# Patient Record
Sex: FEMALE | Race: WHITE | NOT HISPANIC OR LATINO | Employment: OTHER | URBAN - METROPOLITAN AREA
[De-identification: names, ages, dates, MRNs, and addresses within clinical notes are randomized per-mention and may not be internally consistent; named-entity substitution may affect disease eponyms.]

---

## 2017-01-04 ENCOUNTER — AMBULATORY - HEALTHEAST (OUTPATIENT)
Dept: INTERNAL MEDICINE | Facility: CLINIC | Age: 62
End: 2017-01-04

## 2017-01-04 ENCOUNTER — COMMUNICATION - HEALTHEAST (OUTPATIENT)
Dept: INTERNAL MEDICINE | Facility: CLINIC | Age: 62
End: 2017-01-04

## 2017-01-08 ENCOUNTER — COMMUNICATION - HEALTHEAST (OUTPATIENT)
Dept: INTERNAL MEDICINE | Facility: CLINIC | Age: 62
End: 2017-01-08

## 2017-01-08 DIAGNOSIS — K21.00 GASTROESOPHAGEAL REFLUX DISEASE WITH ESOPHAGITIS: ICD-10-CM

## 2017-02-08 ENCOUNTER — COMMUNICATION - HEALTHEAST (OUTPATIENT)
Dept: INTERNAL MEDICINE | Facility: CLINIC | Age: 62
End: 2017-02-08

## 2017-02-08 ENCOUNTER — OFFICE VISIT - HEALTHEAST (OUTPATIENT)
Dept: INTERNAL MEDICINE | Facility: CLINIC | Age: 62
End: 2017-02-08

## 2017-02-08 DIAGNOSIS — E03.9 HYPOTHYROIDISM: ICD-10-CM

## 2017-02-08 DIAGNOSIS — E01.0 THYROMEGALY: ICD-10-CM

## 2017-02-08 DIAGNOSIS — Z51.81 MEDICATION MONITORING ENCOUNTER: ICD-10-CM

## 2017-02-08 DIAGNOSIS — J20.9 BRONCHITIS, ACUTE, WITH BRONCHOSPASM: ICD-10-CM

## 2017-02-09 ENCOUNTER — COMMUNICATION - HEALTHEAST (OUTPATIENT)
Dept: INTERNAL MEDICINE | Facility: CLINIC | Age: 62
End: 2017-02-09

## 2017-02-09 ENCOUNTER — COMMUNICATION - HEALTHEAST (OUTPATIENT)
Dept: PALLIATIVE MEDICINE | Facility: OTHER | Age: 62
End: 2017-02-09

## 2017-02-10 ENCOUNTER — COMMUNICATION - HEALTHEAST (OUTPATIENT)
Dept: INTERNAL MEDICINE | Facility: CLINIC | Age: 62
End: 2017-02-10

## 2017-02-17 ENCOUNTER — HOSPITAL ENCOUNTER (OUTPATIENT)
Dept: PALLIATIVE MEDICINE | Facility: OTHER | Age: 62
Discharge: HOME | End: 2017-02-17
Attending: NURSE PRACTITIONER

## 2017-02-17 ENCOUNTER — COMMUNICATION - HEALTHEAST (OUTPATIENT)
Dept: TELEHEALTH | Facility: CLINIC | Age: 62
End: 2017-02-17

## 2017-02-17 DIAGNOSIS — G89.4 CHRONIC PAIN SYNDROME: ICD-10-CM

## 2017-02-17 DIAGNOSIS — M79.671 PAIN IN RIGHT FOOT: ICD-10-CM

## 2017-02-17 DIAGNOSIS — M46.1 SACROILIITIS (H): ICD-10-CM

## 2017-02-17 ASSESSMENT — MIFFLIN-ST. JEOR: SCORE: 1595.02

## 2017-02-20 ENCOUNTER — COMMUNICATION - HEALTHEAST (OUTPATIENT)
Dept: PALLIATIVE MEDICINE | Facility: OTHER | Age: 62
End: 2017-02-20

## 2017-03-01 ENCOUNTER — RECORDS - HEALTHEAST (OUTPATIENT)
Dept: ADMINISTRATIVE | Facility: OTHER | Age: 62
End: 2017-03-01

## 2017-03-09 ENCOUNTER — COMMUNICATION - HEALTHEAST (OUTPATIENT)
Dept: TELEHEALTH | Facility: CLINIC | Age: 62
End: 2017-03-09

## 2017-03-09 ENCOUNTER — HOSPITAL ENCOUNTER (OUTPATIENT)
Dept: ULTRASOUND IMAGING | Facility: HOSPITAL | Age: 62
Discharge: HOME | End: 2017-03-09
Attending: INTERNAL MEDICINE

## 2017-03-09 DIAGNOSIS — E01.0 THYROMEGALY: ICD-10-CM

## 2017-03-09 DIAGNOSIS — E04.9 NONTOXIC GOITER, UNSPECIFIED: ICD-10-CM

## 2017-03-17 ENCOUNTER — HOSPITAL ENCOUNTER (OUTPATIENT)
Dept: PALLIATIVE MEDICINE | Facility: OTHER | Age: 62
Discharge: HOME | End: 2017-03-17
Attending: SOCIAL WORKER

## 2017-03-17 DIAGNOSIS — F41.1 GENERALIZED ANXIETY DISORDER: ICD-10-CM

## 2017-03-17 DIAGNOSIS — F33.2 SEVERE RECURRENT MAJOR DEPRESSION WITHOUT PSYCHOTIC FEATURES (H): ICD-10-CM

## 2017-03-17 DIAGNOSIS — G89.4 CHRONIC PAIN SYNDROME: ICD-10-CM

## 2017-03-21 ENCOUNTER — COMMUNICATION - HEALTHEAST (OUTPATIENT)
Dept: PALLIATIVE MEDICINE | Facility: OTHER | Age: 62
End: 2017-03-21

## 2017-03-21 ENCOUNTER — RECORDS - HEALTHEAST (OUTPATIENT)
Dept: ADMINISTRATIVE | Facility: OTHER | Age: 62
End: 2017-03-21

## 2017-03-21 ENCOUNTER — COMMUNICATION - HEALTHEAST (OUTPATIENT)
Dept: INTERNAL MEDICINE | Facility: CLINIC | Age: 62
End: 2017-03-21

## 2017-03-23 ENCOUNTER — AMBULATORY - HEALTHEAST (OUTPATIENT)
Dept: PALLIATIVE MEDICINE | Facility: OTHER | Age: 62
End: 2017-03-23

## 2017-03-27 ENCOUNTER — COMMUNICATION - HEALTHEAST (OUTPATIENT)
Dept: PALLIATIVE MEDICINE | Facility: OTHER | Age: 62
End: 2017-03-27

## 2017-03-27 DIAGNOSIS — M46.1 SACROILIITIS (H): ICD-10-CM

## 2017-03-27 DIAGNOSIS — M79.671 PAIN IN RIGHT FOOT: ICD-10-CM

## 2017-03-27 DIAGNOSIS — G89.4 CHRONIC PAIN SYNDROME: ICD-10-CM

## 2017-03-29 ENCOUNTER — COMMUNICATION - HEALTHEAST (OUTPATIENT)
Dept: INTERNAL MEDICINE | Facility: CLINIC | Age: 62
End: 2017-03-29

## 2017-03-29 ENCOUNTER — HOSPITAL ENCOUNTER (OUTPATIENT)
Dept: PALLIATIVE MEDICINE | Facility: OTHER | Age: 62
Discharge: HOME | End: 2017-03-29
Attending: SOCIAL WORKER

## 2017-03-29 DIAGNOSIS — G89.4 CHRONIC PAIN SYNDROME: ICD-10-CM

## 2017-03-29 DIAGNOSIS — F31.63 BIPOLAR DISORDER, CURRENT EPISODE MIXED, SEVERE, WITHOUT PSYCHOTIC FEATURES (H): ICD-10-CM

## 2017-03-29 DIAGNOSIS — F41.1 GENERALIZED ANXIETY DISORDER: ICD-10-CM

## 2017-03-30 ENCOUNTER — COMMUNICATION - HEALTHEAST (OUTPATIENT)
Dept: SCHEDULING | Facility: CLINIC | Age: 62
End: 2017-03-30

## 2017-04-04 ENCOUNTER — COMMUNICATION - HEALTHEAST (OUTPATIENT)
Dept: INTERNAL MEDICINE | Facility: CLINIC | Age: 62
End: 2017-04-04

## 2017-04-04 ENCOUNTER — OFFICE VISIT - HEALTHEAST (OUTPATIENT)
Dept: INTERNAL MEDICINE | Facility: CLINIC | Age: 62
End: 2017-04-04

## 2017-04-04 DIAGNOSIS — R05.9 COUGH: ICD-10-CM

## 2017-04-04 DIAGNOSIS — R61 NIGHT SWEATS: ICD-10-CM

## 2017-04-04 DIAGNOSIS — E83.119 HEMOCHROMATOSIS: ICD-10-CM

## 2017-04-04 DIAGNOSIS — C50.919 MALIGNANT NEOPLASM OF FEMALE BREAST (H): ICD-10-CM

## 2017-04-05 ENCOUNTER — COMMUNICATION - HEALTHEAST (OUTPATIENT)
Dept: INTERNAL MEDICINE | Facility: CLINIC | Age: 62
End: 2017-04-05

## 2017-04-05 DIAGNOSIS — G47.00 INSOMNIA: ICD-10-CM

## 2017-04-10 ENCOUNTER — HOSPITAL ENCOUNTER (OUTPATIENT)
Dept: RADIOLOGY | Facility: HOSPITAL | Age: 62
Discharge: HOME | End: 2017-04-10
Attending: INTERNAL MEDICINE

## 2017-04-10 ENCOUNTER — HOSPITAL ENCOUNTER (OUTPATIENT)
Dept: CT IMAGING | Facility: HOSPITAL | Age: 62
Discharge: HOME | End: 2017-04-10
Attending: INTERNAL MEDICINE

## 2017-04-10 ENCOUNTER — AMBULATORY - HEALTHEAST (OUTPATIENT)
Dept: INTERNAL MEDICINE | Facility: CLINIC | Age: 62
End: 2017-04-10

## 2017-04-10 ENCOUNTER — HOSPITAL ENCOUNTER (OUTPATIENT)
Dept: MAMMOGRAPHY | Facility: HOSPITAL | Age: 62
Discharge: HOME | End: 2017-04-10
Attending: INTERNAL MEDICINE

## 2017-04-10 DIAGNOSIS — D35.00 ADRENAL ADENOMA: ICD-10-CM

## 2017-04-10 DIAGNOSIS — Z12.31 VISIT FOR SCREENING MAMMOGRAM: ICD-10-CM

## 2017-04-10 DIAGNOSIS — R61 NIGHT SWEATS: ICD-10-CM

## 2017-04-13 ENCOUNTER — HOSPITAL ENCOUNTER (OUTPATIENT)
Dept: PALLIATIVE MEDICINE | Facility: OTHER | Age: 62
Discharge: HOME | End: 2017-04-13
Attending: SOCIAL WORKER

## 2017-04-13 ENCOUNTER — HOSPITAL ENCOUNTER (OUTPATIENT)
Dept: PALLIATIVE MEDICINE | Facility: OTHER | Age: 62
Discharge: HOME | End: 2017-04-13
Attending: NURSE PRACTITIONER

## 2017-04-13 DIAGNOSIS — F33.2 SEVERE RECURRENT MAJOR DEPRESSION WITHOUT PSYCHOTIC FEATURES (H): ICD-10-CM

## 2017-04-13 DIAGNOSIS — M79.671 PAIN IN RIGHT FOOT: ICD-10-CM

## 2017-04-13 DIAGNOSIS — F31.63 BIPOLAR DISORDER, CURRENT EPISODE MIXED, SEVERE, WITHOUT PSYCHOTIC FEATURES (H): ICD-10-CM

## 2017-04-13 DIAGNOSIS — F41.1 GENERALIZED ANXIETY DISORDER: ICD-10-CM

## 2017-04-13 DIAGNOSIS — G89.4 CHRONIC PAIN SYNDROME: ICD-10-CM

## 2017-04-13 DIAGNOSIS — M46.1 SACROILIITIS (H): ICD-10-CM

## 2017-04-13 ASSESSMENT — MIFFLIN-ST. JEOR: SCORE: 1590.49

## 2017-04-14 ENCOUNTER — OFFICE VISIT - HEALTHEAST (OUTPATIENT)
Dept: INTERNAL MEDICINE | Facility: CLINIC | Age: 62
End: 2017-04-14

## 2017-04-14 DIAGNOSIS — Z00.00 PREVENTATIVE HEALTH CARE: ICD-10-CM

## 2017-04-14 DIAGNOSIS — J44.1 CHRONIC OBSTRUCTIVE PULMONARY DISEASE WITH ACUTE EXACERBATION (H): ICD-10-CM

## 2017-04-14 DIAGNOSIS — D35.00 BENIGN NEOPLASM OF ADRENAL GLAND: ICD-10-CM

## 2017-04-14 DIAGNOSIS — R23.2 FLUSHING: ICD-10-CM

## 2017-04-14 DIAGNOSIS — E56.9 VITAMIN DEFICIENCY: ICD-10-CM

## 2017-04-14 DIAGNOSIS — M54.2 NECK PAIN: ICD-10-CM

## 2017-04-14 DIAGNOSIS — E87.6 HYPOKALEMIA: ICD-10-CM

## 2017-04-14 ASSESSMENT — MIFFLIN-ST. JEOR: SCORE: 1606.36

## 2017-04-17 ENCOUNTER — RECORDS - HEALTHEAST (OUTPATIENT)
Dept: ADMINISTRATIVE | Facility: OTHER | Age: 62
End: 2017-04-17

## 2017-04-19 ENCOUNTER — HOSPITAL ENCOUNTER (OUTPATIENT)
Dept: PALLIATIVE MEDICINE | Facility: OTHER | Age: 62
Discharge: HOME | End: 2017-04-19
Attending: SOCIAL WORKER

## 2017-04-19 ENCOUNTER — AMBULATORY - HEALTHEAST (OUTPATIENT)
Dept: LAB | Facility: CLINIC | Age: 62
End: 2017-04-19

## 2017-04-19 DIAGNOSIS — G89.4 CHRONIC PAIN SYNDROME: ICD-10-CM

## 2017-04-19 DIAGNOSIS — F33.2 SEVERE RECURRENT MAJOR DEPRESSION WITHOUT PSYCHOTIC FEATURES (H): ICD-10-CM

## 2017-04-19 DIAGNOSIS — F31.63 BIPOLAR DISORDER, CURRENT EPISODE MIXED, SEVERE, WITHOUT PSYCHOTIC FEATURES (H): ICD-10-CM

## 2017-04-19 DIAGNOSIS — D35.00 ADRENAL ADENOMA: ICD-10-CM

## 2017-04-19 DIAGNOSIS — F41.1 GENERALIZED ANXIETY DISORDER: ICD-10-CM

## 2017-04-20 ENCOUNTER — AMBULATORY - HEALTHEAST (OUTPATIENT)
Dept: LAB | Facility: CLINIC | Age: 62
End: 2017-04-20

## 2017-04-20 DIAGNOSIS — Z00.00 PREVENTATIVE HEALTH CARE: ICD-10-CM

## 2017-04-20 DIAGNOSIS — E56.9 VITAMIN DEFICIENCY: ICD-10-CM

## 2017-04-20 DIAGNOSIS — E87.6 HYPOKALEMIA: ICD-10-CM

## 2017-04-20 LAB
CHOLEST SERPL-MCNC: 234 MG/DL
FASTING STATUS PATIENT QL REPORTED: YES
HDLC SERPL-MCNC: 64 MG/DL
LDLC SERPL CALC-MCNC: 133 MG/DL
TRIGL SERPL-MCNC: 183 MG/DL

## 2017-04-21 LAB
Lab: 24 H
METANEPHRINE, U - HISTORICAL: 719 MCG/24 H
NORMETANEPHRINE, U - HISTORICAL: 409 MCG/24 H
SPECIMEN VOL UR: 2025 ML
TOTAL METANEPHRINES, U: 1128 MCG/24 H

## 2017-04-24 ENCOUNTER — OFFICE VISIT - HEALTHEAST (OUTPATIENT)
Dept: SLEEP MEDICINE | Facility: CLINIC | Age: 62
End: 2017-04-24

## 2017-04-24 ENCOUNTER — COMMUNICATION - HEALTHEAST (OUTPATIENT)
Dept: ENDOCRINOLOGY | Facility: CLINIC | Age: 62
End: 2017-04-24

## 2017-04-24 ENCOUNTER — COMMUNICATION - HEALTHEAST (OUTPATIENT)
Dept: INTERNAL MEDICINE | Facility: CLINIC | Age: 62
End: 2017-04-24

## 2017-04-24 ENCOUNTER — AMBULATORY - HEALTHEAST (OUTPATIENT)
Dept: PALLIATIVE MEDICINE | Facility: OTHER | Age: 62
End: 2017-04-24

## 2017-04-24 DIAGNOSIS — D35.00 ADRENAL ADENOMA: ICD-10-CM

## 2017-04-24 DIAGNOSIS — F63.9 IMPULSE CONTROL DISORDER: ICD-10-CM

## 2017-04-24 DIAGNOSIS — G25.81 RESTLESS LEG SYNDROME: ICD-10-CM

## 2017-04-24 DIAGNOSIS — R06.83 SNORING: ICD-10-CM

## 2017-04-24 DIAGNOSIS — G47.10 HYPERSOMNIA, UNSPECIFIED: ICD-10-CM

## 2017-04-24 ASSESSMENT — MIFFLIN-ST. JEOR: SCORE: 1612.26

## 2017-04-26 ENCOUNTER — HOSPITAL ENCOUNTER (OUTPATIENT)
Dept: PALLIATIVE MEDICINE | Facility: OTHER | Age: 62
Discharge: HOME | End: 2017-04-26
Attending: SOCIAL WORKER

## 2017-04-26 DIAGNOSIS — F31.63 BIPOLAR DISORDER, CURRENT EPISODE MIXED, SEVERE, WITHOUT PSYCHOTIC FEATURES (H): ICD-10-CM

## 2017-04-26 DIAGNOSIS — G89.4 CHRONIC PAIN SYNDROME: ICD-10-CM

## 2017-04-26 DIAGNOSIS — F33.2 SEVERE RECURRENT MAJOR DEPRESSION WITHOUT PSYCHOTIC FEATURES (H): ICD-10-CM

## 2017-04-26 DIAGNOSIS — F41.1 GENERALIZED ANXIETY DISORDER: ICD-10-CM

## 2017-05-02 ENCOUNTER — HOSPITAL ENCOUNTER (OUTPATIENT)
Dept: PALLIATIVE MEDICINE | Facility: OTHER | Age: 62
Discharge: HOME | End: 2017-05-02
Attending: SOCIAL WORKER

## 2017-05-02 DIAGNOSIS — G89.4 CHRONIC PAIN SYNDROME: ICD-10-CM

## 2017-05-02 DIAGNOSIS — F41.1 GENERALIZED ANXIETY DISORDER: ICD-10-CM

## 2017-05-02 DIAGNOSIS — F31.63 BIPOLAR DISORDER, CURRENT EPISODE MIXED, SEVERE, WITHOUT PSYCHOTIC FEATURES (H): ICD-10-CM

## 2017-05-03 ENCOUNTER — COMMUNICATION - HEALTHEAST (OUTPATIENT)
Dept: INTERNAL MEDICINE | Facility: CLINIC | Age: 62
End: 2017-05-03

## 2017-05-03 ENCOUNTER — OFFICE VISIT - HEALTHEAST (OUTPATIENT)
Dept: INTERNAL MEDICINE | Facility: CLINIC | Age: 62
End: 2017-05-03

## 2017-05-03 DIAGNOSIS — D35.00 ADRENAL ADENOMA: ICD-10-CM

## 2017-05-03 DIAGNOSIS — I10 ESSENTIAL HYPERTENSION WITH GOAL BLOOD PRESSURE LESS THAN 140/90: ICD-10-CM

## 2017-05-03 DIAGNOSIS — M89.9 DISORDER OF BONE AND CARTILAGE: ICD-10-CM

## 2017-05-03 DIAGNOSIS — E89.0 POSTABLATIVE HYPOTHYROIDISM: ICD-10-CM

## 2017-05-03 DIAGNOSIS — M94.9 DISORDER OF BONE AND CARTILAGE: ICD-10-CM

## 2017-05-05 ENCOUNTER — RECORDS - HEALTHEAST (OUTPATIENT)
Dept: ADMINISTRATIVE | Facility: OTHER | Age: 62
End: 2017-05-05

## 2017-05-06 ENCOUNTER — COMMUNICATION - HEALTHEAST (OUTPATIENT)
Dept: INTERNAL MEDICINE | Facility: CLINIC | Age: 62
End: 2017-05-06

## 2017-05-08 ENCOUNTER — RECORDS - HEALTHEAST (OUTPATIENT)
Dept: ADMINISTRATIVE | Facility: OTHER | Age: 62
End: 2017-05-08

## 2017-05-09 ENCOUNTER — TRANSFERRED RECORDS (OUTPATIENT)
Dept: HEALTH INFORMATION MANAGEMENT | Facility: CLINIC | Age: 62
End: 2017-05-09

## 2017-05-09 ENCOUNTER — RECORDS - HEALTHEAST (OUTPATIENT)
Dept: SLEEP MEDICINE | Facility: CLINIC | Age: 62
End: 2017-05-09

## 2017-05-09 DIAGNOSIS — G47.10 HYPERSOMNIA, UNSPECIFIED: ICD-10-CM

## 2017-05-09 DIAGNOSIS — R06.83 SNORING: ICD-10-CM

## 2017-05-12 ENCOUNTER — OFFICE VISIT - HEALTHEAST (OUTPATIENT)
Dept: INTERNAL MEDICINE | Facility: CLINIC | Age: 62
End: 2017-05-12

## 2017-05-12 DIAGNOSIS — I10 ESSENTIAL HYPERTENSION WITH GOAL BLOOD PRESSURE LESS THAN 140/90: ICD-10-CM

## 2017-05-12 DIAGNOSIS — G47.33 SLEEP APNEA, OBSTRUCTIVE: ICD-10-CM

## 2017-05-12 DIAGNOSIS — J44.9 COPD (CHRONIC OBSTRUCTIVE PULMONARY DISEASE) (H): ICD-10-CM

## 2017-05-12 DIAGNOSIS — E03.9 ACQUIRED HYPOTHYROIDISM: ICD-10-CM

## 2017-05-12 DIAGNOSIS — F41.1 ANXIETY STATE: ICD-10-CM

## 2017-05-12 DIAGNOSIS — E83.10 DISORDER OF IRON METABOLISM: ICD-10-CM

## 2017-05-12 DIAGNOSIS — Z01.818 PRE-OPERATIVE EXAMINATION FOR INTERNAL MEDICINE: ICD-10-CM

## 2017-05-12 ASSESSMENT — MIFFLIN-ST. JEOR: SCORE: 1585.95

## 2017-05-15 LAB
ATRIAL RATE - MUSE: 89 BPM
DIASTOLIC BLOOD PRESSURE - MUSE: NORMAL MMHG
INTERPRETATION ECG - MUSE: NORMAL
P AXIS - MUSE: 63 DEGREES
PR INTERVAL - MUSE: 168 MS
QRS DURATION - MUSE: 116 MS
QT - MUSE: 398 MS
QTC - MUSE: 484 MS
R AXIS - MUSE: 76 DEGREES
SYSTOLIC BLOOD PRESSURE - MUSE: NORMAL MMHG
T AXIS - MUSE: 43 DEGREES
VENTRICULAR RATE- MUSE: 89 BPM

## 2017-05-17 ENCOUNTER — HOSPITAL ENCOUNTER (OUTPATIENT)
Dept: PALLIATIVE MEDICINE | Facility: OTHER | Age: 62
Discharge: HOME | End: 2017-05-17
Attending: NURSE PRACTITIONER

## 2017-05-17 DIAGNOSIS — G89.4 CHRONIC PAIN SYNDROME: ICD-10-CM

## 2017-05-17 DIAGNOSIS — M79.671 PAIN IN RIGHT FOOT: ICD-10-CM

## 2017-05-17 DIAGNOSIS — M25.511 RIGHT SHOULDER PAIN, UNSPECIFIED CHRONICITY: ICD-10-CM

## 2017-05-17 DIAGNOSIS — M46.1 SACROILIITIS (H): ICD-10-CM

## 2017-05-17 ASSESSMENT — MIFFLIN-ST. JEOR: SCORE: 1585.95

## 2017-05-18 ENCOUNTER — COMMUNICATION - HEALTHEAST (OUTPATIENT)
Dept: SLEEP MEDICINE | Facility: CLINIC | Age: 62
End: 2017-05-18

## 2017-05-18 ENCOUNTER — COMMUNICATION - HEALTHEAST (OUTPATIENT)
Dept: INTERNAL MEDICINE | Facility: CLINIC | Age: 62
End: 2017-05-18

## 2017-05-19 ENCOUNTER — AMBULATORY - HEALTHEAST (OUTPATIENT)
Dept: SLEEP MEDICINE | Facility: CLINIC | Age: 62
End: 2017-05-19

## 2017-05-19 ENCOUNTER — OFFICE VISIT - HEALTHEAST (OUTPATIENT)
Dept: SLEEP MEDICINE | Facility: CLINIC | Age: 62
End: 2017-05-19

## 2017-05-19 DIAGNOSIS — G47.33 OSA (OBSTRUCTIVE SLEEP APNEA): ICD-10-CM

## 2017-05-19 DIAGNOSIS — G25.81 RESTLESS LEG SYNDROME: ICD-10-CM

## 2017-05-19 ASSESSMENT — MIFFLIN-ST. JEOR: SCORE: 1585.95

## 2017-05-22 ENCOUNTER — COMMUNICATION - HEALTHEAST (OUTPATIENT)
Dept: SLEEP MEDICINE | Facility: CLINIC | Age: 62
End: 2017-05-22

## 2017-05-23 ENCOUNTER — COMMUNICATION - HEALTHEAST (OUTPATIENT)
Dept: PALLIATIVE MEDICINE | Facility: OTHER | Age: 62
End: 2017-05-23

## 2017-05-24 ENCOUNTER — RECORDS - HEALTHEAST (OUTPATIENT)
Dept: ADMINISTRATIVE | Facility: OTHER | Age: 62
End: 2017-05-24

## 2017-05-31 ENCOUNTER — HOSPITAL ENCOUNTER (OUTPATIENT)
Dept: PALLIATIVE MEDICINE | Facility: OTHER | Age: 62
Discharge: HOME | End: 2017-05-31
Attending: SOCIAL WORKER

## 2017-05-31 ENCOUNTER — COMMUNICATION - HEALTHEAST (OUTPATIENT)
Dept: INTERNAL MEDICINE | Facility: CLINIC | Age: 62
End: 2017-05-31

## 2017-05-31 DIAGNOSIS — G89.4 CHRONIC PAIN SYNDROME: ICD-10-CM

## 2017-05-31 DIAGNOSIS — F41.1 GENERALIZED ANXIETY DISORDER: ICD-10-CM

## 2017-05-31 DIAGNOSIS — F31.63 BIPOLAR DISORDER, CURRENT EPISODE MIXED, SEVERE, WITHOUT PSYCHOTIC FEATURES (H): ICD-10-CM

## 2017-06-02 ENCOUNTER — COMMUNICATION - HEALTHEAST (OUTPATIENT)
Dept: SCHEDULING | Facility: CLINIC | Age: 62
End: 2017-06-02

## 2017-06-02 ENCOUNTER — OFFICE VISIT - HEALTHEAST (OUTPATIENT)
Dept: INTERNAL MEDICINE | Facility: CLINIC | Age: 62
End: 2017-06-02

## 2017-06-02 ENCOUNTER — AMBULATORY - HEALTHEAST (OUTPATIENT)
Dept: SCHEDULING | Facility: CLINIC | Age: 62
End: 2017-06-02

## 2017-06-02 DIAGNOSIS — D35.00 PHEOCHROMOCYTOMA: ICD-10-CM

## 2017-06-05 ENCOUNTER — RECORDS - HEALTHEAST (OUTPATIENT)
Dept: ADMINISTRATIVE | Facility: OTHER | Age: 62
End: 2017-06-05

## 2017-06-05 ENCOUNTER — COMMUNICATION - HEALTHEAST (OUTPATIENT)
Dept: PALLIATIVE MEDICINE | Facility: OTHER | Age: 62
End: 2017-06-05

## 2017-06-05 ENCOUNTER — COMMUNICATION - HEALTHEAST (OUTPATIENT)
Dept: SLEEP MEDICINE | Facility: CLINIC | Age: 62
End: 2017-06-05

## 2017-06-06 ENCOUNTER — HOSPITAL ENCOUNTER (OUTPATIENT)
Dept: PALLIATIVE MEDICINE | Facility: OTHER | Age: 62
Discharge: HOME | End: 2017-06-06
Attending: SOCIAL WORKER

## 2017-06-06 DIAGNOSIS — G89.4 CHRONIC PAIN SYNDROME: ICD-10-CM

## 2017-06-06 DIAGNOSIS — F31.63 BIPOLAR DISORDER, CURRENT EPISODE MIXED, SEVERE, WITHOUT PSYCHOTIC FEATURES (H): ICD-10-CM

## 2017-06-06 DIAGNOSIS — F41.1 GENERALIZED ANXIETY DISORDER: ICD-10-CM

## 2017-06-12 ENCOUNTER — HOSPITAL ENCOUNTER (OUTPATIENT)
Dept: PALLIATIVE MEDICINE | Facility: OTHER | Age: 62
Discharge: HOME | End: 2017-06-12
Attending: SOCIAL WORKER

## 2017-06-12 DIAGNOSIS — G89.4 CHRONIC PAIN SYNDROME: ICD-10-CM

## 2017-06-12 DIAGNOSIS — F41.1 GENERALIZED ANXIETY DISORDER: ICD-10-CM

## 2017-06-12 DIAGNOSIS — F31.63 BIPOLAR DISORDER, CURRENT EPISODE MIXED, SEVERE, WITHOUT PSYCHOTIC FEATURES (H): ICD-10-CM

## 2017-06-13 ENCOUNTER — HOSPITAL ENCOUNTER (OUTPATIENT)
Dept: PALLIATIVE MEDICINE | Facility: OTHER | Age: 62
Discharge: HOME | End: 2017-06-13
Attending: NURSE PRACTITIONER

## 2017-06-13 ENCOUNTER — AMBULATORY - HEALTHEAST (OUTPATIENT)
Dept: PALLIATIVE MEDICINE | Facility: OTHER | Age: 62
End: 2017-06-13

## 2017-06-13 ENCOUNTER — RECORDS - HEALTHEAST (OUTPATIENT)
Dept: ADMINISTRATIVE | Facility: OTHER | Age: 62
End: 2017-06-13

## 2017-06-13 DIAGNOSIS — M46.1 SACROILIITIS (H): ICD-10-CM

## 2017-06-13 DIAGNOSIS — M54.2 NECK PAIN: ICD-10-CM

## 2017-06-13 DIAGNOSIS — G89.4 CHRONIC PAIN SYNDROME: ICD-10-CM

## 2017-06-13 DIAGNOSIS — M79.671 PAIN IN RIGHT FOOT: ICD-10-CM

## 2017-06-13 DIAGNOSIS — M25.511 ACUTE PAIN OF RIGHT SHOULDER: ICD-10-CM

## 2017-06-13 ASSESSMENT — MIFFLIN-ST. JEOR: SCORE: 1590.49

## 2017-06-14 ENCOUNTER — RECORDS - HEALTHEAST (OUTPATIENT)
Dept: ADMINISTRATIVE | Facility: OTHER | Age: 62
End: 2017-06-14

## 2017-06-15 ENCOUNTER — HOSPITAL ENCOUNTER (OUTPATIENT)
Dept: MRI IMAGING | Facility: HOSPITAL | Age: 62
Discharge: HOME | End: 2017-06-15

## 2017-06-15 DIAGNOSIS — D35.02 ADRENAL ADENOMA, LEFT: ICD-10-CM

## 2017-06-15 DIAGNOSIS — D35.00 PHEOCHROMOCYTOMA: ICD-10-CM

## 2017-06-20 ENCOUNTER — OFFICE VISIT - HEALTHEAST (OUTPATIENT)
Dept: SURGERY | Facility: CLINIC | Age: 62
End: 2017-06-20

## 2017-06-20 DIAGNOSIS — D35.00 PHEOCHROMOCYTOMA: ICD-10-CM

## 2017-06-20 ASSESSMENT — MIFFLIN-ST. JEOR: SCORE: 1606.82

## 2017-06-28 ENCOUNTER — COMMUNICATION - HEALTHEAST (OUTPATIENT)
Dept: INTERNAL MEDICINE | Facility: CLINIC | Age: 62
End: 2017-06-28

## 2017-07-12 ENCOUNTER — OFFICE VISIT - HEALTHEAST (OUTPATIENT)
Dept: INTERNAL MEDICINE | Facility: CLINIC | Age: 62
End: 2017-07-12

## 2017-07-12 DIAGNOSIS — R32 INCONTINENCE: ICD-10-CM

## 2017-07-12 DIAGNOSIS — E87.6 HYPOKALEMIA: ICD-10-CM

## 2017-07-12 DIAGNOSIS — E27.8 ADRENAL MASS (H): ICD-10-CM

## 2017-07-12 ASSESSMENT — MIFFLIN-ST. JEOR: SCORE: 1552.5

## 2017-07-13 ENCOUNTER — RECORDS - HEALTHEAST (OUTPATIENT)
Dept: ADMINISTRATIVE | Facility: OTHER | Age: 62
End: 2017-07-13

## 2017-07-17 ENCOUNTER — COMMUNICATION - HEALTHEAST (OUTPATIENT)
Dept: ADMINISTRATIVE | Facility: CLINIC | Age: 62
End: 2017-07-17

## 2017-07-19 ENCOUNTER — COMMUNICATION - HEALTHEAST (OUTPATIENT)
Dept: SLEEP MEDICINE | Facility: CLINIC | Age: 62
End: 2017-07-19

## 2017-07-25 ENCOUNTER — OFFICE VISIT - HEALTHEAST (OUTPATIENT)
Dept: INTERNAL MEDICINE | Facility: CLINIC | Age: 62
End: 2017-07-25

## 2017-07-25 DIAGNOSIS — M54.2 NECK PAIN: ICD-10-CM

## 2017-07-25 DIAGNOSIS — E83.10 DISORDER OF IRON METABOLISM: ICD-10-CM

## 2017-07-25 DIAGNOSIS — J44.9 COPD (CHRONIC OBSTRUCTIVE PULMONARY DISEASE) (H): ICD-10-CM

## 2017-07-25 DIAGNOSIS — G89.4 CHRONIC PAIN DISORDER: ICD-10-CM

## 2017-07-25 DIAGNOSIS — E27.8 ADRENAL MASS (H): ICD-10-CM

## 2017-07-25 DIAGNOSIS — Z01.818 PREOP EXAM FOR INTERNAL MEDICINE: ICD-10-CM

## 2017-07-25 DIAGNOSIS — G47.33 SLEEP APNEA, OBSTRUCTIVE: ICD-10-CM

## 2017-07-25 DIAGNOSIS — M25.511 ACUTE PAIN OF RIGHT SHOULDER: ICD-10-CM

## 2017-07-25 DIAGNOSIS — K21.9 ESOPHAGEAL REFLUX: ICD-10-CM

## 2017-07-25 DIAGNOSIS — I10 ESSENTIAL HYPERTENSION WITH GOAL BLOOD PRESSURE LESS THAN 140/90: ICD-10-CM

## 2017-07-25 DIAGNOSIS — E66.01 MORBID OBESITY (H): ICD-10-CM

## 2017-07-25 DIAGNOSIS — M46.1 SACROILIITIS (H): ICD-10-CM

## 2017-07-25 ASSESSMENT — MIFFLIN-ST. JEOR: SCORE: 1584.26

## 2017-07-31 ENCOUNTER — HOSPITAL ENCOUNTER (OUTPATIENT)
Dept: PALLIATIVE MEDICINE | Facility: OTHER | Age: 62
Discharge: HOME | End: 2017-07-31
Attending: SOCIAL WORKER

## 2017-07-31 ENCOUNTER — HOSPITAL ENCOUNTER (OUTPATIENT)
Dept: PALLIATIVE MEDICINE | Facility: OTHER | Age: 62
Discharge: HOME | End: 2017-07-31
Attending: NURSE PRACTITIONER

## 2017-07-31 ENCOUNTER — AMBULATORY - HEALTHEAST (OUTPATIENT)
Dept: SURGERY | Facility: CLINIC | Age: 62
End: 2017-07-31

## 2017-07-31 DIAGNOSIS — F41.1 GENERALIZED ANXIETY DISORDER: ICD-10-CM

## 2017-07-31 DIAGNOSIS — M25.511 ACUTE PAIN OF RIGHT SHOULDER: ICD-10-CM

## 2017-07-31 DIAGNOSIS — M54.2 NECK PAIN: ICD-10-CM

## 2017-07-31 DIAGNOSIS — M46.1 SACROILIITIS (H): ICD-10-CM

## 2017-07-31 DIAGNOSIS — G89.4 CHRONIC PAIN SYNDROME: ICD-10-CM

## 2017-07-31 DIAGNOSIS — F31.63 BIPOLAR DISORDER, CURRENT EPISODE MIXED, SEVERE, WITHOUT PSYCHOTIC FEATURES (H): ICD-10-CM

## 2017-07-31 DIAGNOSIS — M79.671 PAIN IN RIGHT FOOT: ICD-10-CM

## 2017-07-31 ASSESSMENT — MIFFLIN-ST. JEOR: SCORE: 1595.02

## 2017-08-01 ENCOUNTER — OFFICE VISIT - HEALTHEAST (OUTPATIENT)
Dept: SURGERY | Facility: CLINIC | Age: 62
End: 2017-08-01

## 2017-08-01 ENCOUNTER — ANESTHESIA - HEALTHEAST (OUTPATIENT)
Dept: SURGERY | Facility: HOSPITAL | Age: 62
End: 2017-08-01

## 2017-08-01 DIAGNOSIS — D35.02 PHEOCHROMOCYTOMA OF LEFT ADRENAL GLAND: ICD-10-CM

## 2017-08-01 ASSESSMENT — MIFFLIN-ST. JEOR
SCORE: 1595.02
SCORE: 1595.02

## 2017-08-02 ENCOUNTER — SURGERY - HEALTHEAST (OUTPATIENT)
Dept: SURGERY | Facility: HOSPITAL | Age: 62
End: 2017-08-02

## 2017-08-02 ASSESSMENT — MIFFLIN-ST. JEOR: SCORE: 1617.7

## 2017-08-03 ENCOUNTER — HOME CARE/HOSPICE - HEALTHEAST (OUTPATIENT)
Dept: HOME HEALTH SERVICES | Facility: HOME HEALTH | Age: 62
End: 2017-08-03

## 2017-08-03 ENCOUNTER — COMMUNICATION - HEALTHEAST (OUTPATIENT)
Dept: SURGERY | Facility: CLINIC | Age: 62
End: 2017-08-03

## 2017-08-03 ASSESSMENT — MIFFLIN-ST. JEOR: SCORE: 1618.16

## 2017-08-04 ASSESSMENT — MIFFLIN-ST. JEOR: SCORE: 1615.89

## 2017-08-07 ENCOUNTER — COMMUNICATION - HEALTHEAST (OUTPATIENT)
Dept: INTERNAL MEDICINE | Facility: CLINIC | Age: 62
End: 2017-08-07

## 2017-08-07 ENCOUNTER — COMMUNICATION - HEALTHEAST (OUTPATIENT)
Dept: HOME HEALTH SERVICES | Facility: HOME HEALTH | Age: 62
End: 2017-08-07

## 2017-08-08 ENCOUNTER — COMMUNICATION - HEALTHEAST (OUTPATIENT)
Dept: SCHEDULING | Facility: CLINIC | Age: 62
End: 2017-08-08

## 2017-08-08 ENCOUNTER — RECORDS - HEALTHEAST (OUTPATIENT)
Dept: ADMINISTRATIVE | Facility: OTHER | Age: 62
End: 2017-08-08

## 2017-08-08 ENCOUNTER — COMMUNICATION - HEALTHEAST (OUTPATIENT)
Dept: INTERNAL MEDICINE | Facility: CLINIC | Age: 62
End: 2017-08-08

## 2017-08-08 DIAGNOSIS — J44.1 CHRONIC OBSTRUCTIVE PULMONARY DISEASE WITH ACUTE EXACERBATION (H): ICD-10-CM

## 2017-08-09 ENCOUNTER — OFFICE VISIT - HEALTHEAST (OUTPATIENT)
Dept: INTERNAL MEDICINE | Facility: CLINIC | Age: 62
End: 2017-08-09

## 2017-08-09 ENCOUNTER — COMMUNICATION - HEALTHEAST (OUTPATIENT)
Dept: INTERNAL MEDICINE | Facility: CLINIC | Age: 62
End: 2017-08-09

## 2017-08-09 DIAGNOSIS — J44.9 COPD (CHRONIC OBSTRUCTIVE PULMONARY DISEASE) (H): ICD-10-CM

## 2017-08-09 DIAGNOSIS — Z09 HOSPITAL DISCHARGE FOLLOW-UP: ICD-10-CM

## 2017-08-11 ENCOUNTER — COMMUNICATION - HEALTHEAST (OUTPATIENT)
Dept: PALLIATIVE MEDICINE | Facility: OTHER | Age: 62
End: 2017-08-11

## 2017-08-11 ENCOUNTER — RECORDS - HEALTHEAST (OUTPATIENT)
Dept: ADMINISTRATIVE | Facility: OTHER | Age: 62
End: 2017-08-11

## 2017-08-17 ENCOUNTER — OFFICE VISIT - HEALTHEAST (OUTPATIENT)
Dept: SURGERY | Facility: CLINIC | Age: 62
End: 2017-08-17

## 2017-08-17 DIAGNOSIS — D35.02 PHEOCHROMOCYTOMA, BENIGN, LEFT: ICD-10-CM

## 2017-08-17 ASSESSMENT — MIFFLIN-ST. JEOR: SCORE: 1567.81

## 2017-08-25 ENCOUNTER — COMMUNICATION - HEALTHEAST (OUTPATIENT)
Dept: INTERNAL MEDICINE | Facility: CLINIC | Age: 62
End: 2017-08-25

## 2017-09-06 ENCOUNTER — OFFICE VISIT - HEALTHEAST (OUTPATIENT)
Dept: INTERNAL MEDICINE | Facility: CLINIC | Age: 62
End: 2017-09-06

## 2017-09-06 DIAGNOSIS — L40.9 PSORIASIS: ICD-10-CM

## 2017-09-06 DIAGNOSIS — E34.9 HYPERTENSION SECONDARY TO ENDOCRINE DISORDER WITH GOAL BLOOD PRESSURE LESS THAN 140/90: ICD-10-CM

## 2017-09-06 DIAGNOSIS — G25.81 RESTLESS LEGS SYNDROME (RLS): ICD-10-CM

## 2017-09-06 DIAGNOSIS — Z98.890 POST-OPERATIVE STATE: ICD-10-CM

## 2017-09-06 DIAGNOSIS — Z00.00 HEALTH CARE MAINTENANCE: ICD-10-CM

## 2017-09-06 DIAGNOSIS — G47.00 INSOMNIA: ICD-10-CM

## 2017-09-06 DIAGNOSIS — I15.2 HYPERTENSION SECONDARY TO ENDOCRINE DISORDER WITH GOAL BLOOD PRESSURE LESS THAN 140/90: ICD-10-CM

## 2017-09-07 ENCOUNTER — HOSPITAL ENCOUNTER (OUTPATIENT)
Dept: PALLIATIVE MEDICINE | Facility: OTHER | Age: 62
Discharge: HOME | End: 2017-09-07
Attending: NURSE PRACTITIONER

## 2017-09-07 DIAGNOSIS — M46.1 SACROILIITIS (H): ICD-10-CM

## 2017-09-07 DIAGNOSIS — M79.671 PAIN IN RIGHT FOOT: ICD-10-CM

## 2017-09-07 DIAGNOSIS — G89.4 CHRONIC PAIN SYNDROME: ICD-10-CM

## 2017-09-07 ASSESSMENT — MIFFLIN-ST. JEOR: SCORE: 1576.88

## 2017-09-20 ENCOUNTER — COMMUNICATION - HEALTHEAST (OUTPATIENT)
Dept: PALLIATIVE MEDICINE | Facility: OTHER | Age: 62
End: 2017-09-20

## 2017-09-21 ENCOUNTER — OFFICE VISIT - HEALTHEAST (OUTPATIENT)
Dept: SLEEP MEDICINE | Facility: CLINIC | Age: 62
End: 2017-09-21

## 2017-09-21 ENCOUNTER — AMBULATORY - HEALTHEAST (OUTPATIENT)
Dept: SLEEP MEDICINE | Facility: CLINIC | Age: 62
End: 2017-09-21

## 2017-09-21 DIAGNOSIS — G47.33 OSA ON CPAP: ICD-10-CM

## 2017-09-21 ASSESSMENT — MIFFLIN-ST. JEOR: SCORE: 1576.88

## 2017-09-29 ENCOUNTER — HOSPITAL ENCOUNTER (OUTPATIENT)
Dept: PALLIATIVE MEDICINE | Facility: OTHER | Age: 62
Discharge: HOME | End: 2017-09-29
Attending: SOCIAL WORKER

## 2017-09-29 DIAGNOSIS — G89.4 CHRONIC PAIN SYNDROME: ICD-10-CM

## 2017-09-29 DIAGNOSIS — F31.32 BIPOLAR AFFECTIVE DISORDER, CURRENTLY DEPRESSED, MODERATE (H): ICD-10-CM

## 2017-10-04 ENCOUNTER — HOSPITAL ENCOUNTER (OUTPATIENT)
Dept: PALLIATIVE MEDICINE | Facility: OTHER | Age: 62
Discharge: HOME | End: 2017-10-04
Attending: SOCIAL WORKER

## 2017-10-04 DIAGNOSIS — G89.4 CHRONIC PAIN SYNDROME: ICD-10-CM

## 2017-10-04 DIAGNOSIS — F31.32 BIPOLAR AFFECTIVE DISORDER, CURRENTLY DEPRESSED, MODERATE (H): ICD-10-CM

## 2017-10-19 ENCOUNTER — HOSPITAL ENCOUNTER (OUTPATIENT)
Dept: PALLIATIVE MEDICINE | Facility: OTHER | Age: 62
Discharge: HOME | End: 2017-10-19
Attending: SOCIAL WORKER

## 2017-10-19 DIAGNOSIS — F31.32 BIPOLAR AFFECTIVE DISORDER, CURRENTLY DEPRESSED, MODERATE (H): ICD-10-CM

## 2017-10-19 DIAGNOSIS — G89.4 CHRONIC PAIN SYNDROME: ICD-10-CM

## 2017-10-20 ENCOUNTER — COMMUNICATION - HEALTHEAST (OUTPATIENT)
Dept: INTERNAL MEDICINE | Facility: CLINIC | Age: 62
End: 2017-10-20

## 2017-10-26 ENCOUNTER — HOSPITAL ENCOUNTER (OUTPATIENT)
Dept: PALLIATIVE MEDICINE | Facility: OTHER | Age: 62
Discharge: HOME | End: 2017-10-26
Attending: SOCIAL WORKER

## 2017-10-26 DIAGNOSIS — F31.32 BIPOLAR AFFECTIVE DISORDER, CURRENTLY DEPRESSED, MODERATE (H): ICD-10-CM

## 2017-10-26 DIAGNOSIS — F43.12 CHRONIC POST-TRAUMATIC STRESS DISORDER (PTSD): ICD-10-CM

## 2017-10-26 DIAGNOSIS — F41.1 GENERALIZED ANXIETY DISORDER: ICD-10-CM

## 2017-10-26 DIAGNOSIS — G89.4 CHRONIC PAIN SYNDROME: ICD-10-CM

## 2017-10-30 ENCOUNTER — HOSPITAL ENCOUNTER (OUTPATIENT)
Dept: PALLIATIVE MEDICINE | Facility: OTHER | Age: 62
Discharge: HOME | End: 2017-10-30
Attending: NURSE PRACTITIONER

## 2017-10-30 DIAGNOSIS — M46.1 SACROILIITIS (H): ICD-10-CM

## 2017-10-30 DIAGNOSIS — G89.4 CHRONIC PAIN SYNDROME: ICD-10-CM

## 2017-10-30 DIAGNOSIS — M54.2 NECK PAIN: ICD-10-CM

## 2017-10-30 ASSESSMENT — MIFFLIN-ST. JEOR: SCORE: 1576.88

## 2017-11-10 ENCOUNTER — COMMUNICATION - HEALTHEAST (OUTPATIENT)
Dept: PALLIATIVE MEDICINE | Facility: OTHER | Age: 62
End: 2017-11-10

## 2017-11-10 ENCOUNTER — COMMUNICATION - HEALTHEAST (OUTPATIENT)
Dept: INTERNAL MEDICINE | Facility: CLINIC | Age: 62
End: 2017-11-10

## 2017-11-10 DIAGNOSIS — K21.00 GASTROESOPHAGEAL REFLUX DISEASE WITH ESOPHAGITIS: ICD-10-CM

## 2017-11-14 ENCOUNTER — COMMUNICATION - HEALTHEAST (OUTPATIENT)
Dept: PALLIATIVE MEDICINE | Facility: OTHER | Age: 62
End: 2017-11-14

## 2017-11-15 ENCOUNTER — HOSPITAL ENCOUNTER (OUTPATIENT)
Dept: PALLIATIVE MEDICINE | Facility: OTHER | Age: 62
Discharge: HOME | End: 2017-11-15
Attending: PAIN MEDICINE

## 2017-11-15 DIAGNOSIS — G89.4 CHRONIC PAIN SYNDROME: ICD-10-CM

## 2017-11-15 DIAGNOSIS — M54.2 NECK PAIN: ICD-10-CM

## 2017-11-15 ASSESSMENT — MIFFLIN-ST. JEOR: SCORE: 1576.88

## 2017-11-16 ENCOUNTER — COMMUNICATION - HEALTHEAST (OUTPATIENT)
Dept: PALLIATIVE MEDICINE | Facility: OTHER | Age: 62
End: 2017-11-16

## 2017-11-16 ENCOUNTER — HOSPITAL ENCOUNTER (OUTPATIENT)
Dept: PALLIATIVE MEDICINE | Facility: OTHER | Age: 62
Discharge: HOME | End: 2017-11-16
Attending: SOCIAL WORKER

## 2017-11-16 DIAGNOSIS — F43.12 CHRONIC POST-TRAUMATIC STRESS DISORDER (PTSD): ICD-10-CM

## 2017-11-16 DIAGNOSIS — F41.1 GENERALIZED ANXIETY DISORDER: ICD-10-CM

## 2017-11-16 DIAGNOSIS — F31.32 BIPOLAR AFFECTIVE DISORDER, CURRENTLY DEPRESSED, MODERATE (H): ICD-10-CM

## 2017-11-17 ENCOUNTER — RECORDS - HEALTHEAST (OUTPATIENT)
Dept: ADMINISTRATIVE | Facility: OTHER | Age: 62
End: 2017-11-17

## 2017-11-29 ENCOUNTER — COMMUNICATION - HEALTHEAST (OUTPATIENT)
Dept: SLEEP MEDICINE | Facility: CLINIC | Age: 62
End: 2017-11-29

## 2017-12-06 ENCOUNTER — HOSPITAL ENCOUNTER (OUTPATIENT)
Dept: PALLIATIVE MEDICINE | Facility: OTHER | Age: 62
Discharge: HOME | End: 2017-12-06
Attending: NURSE PRACTITIONER

## 2017-12-06 DIAGNOSIS — M46.1 SACROILIITIS (H): ICD-10-CM

## 2017-12-06 DIAGNOSIS — G89.4 CHRONIC PAIN SYNDROME: ICD-10-CM

## 2017-12-06 ASSESSMENT — MIFFLIN-ST. JEOR: SCORE: 1576.88

## 2017-12-15 ENCOUNTER — COMMUNICATION - HEALTHEAST (OUTPATIENT)
Dept: PALLIATIVE MEDICINE | Facility: OTHER | Age: 62
End: 2017-12-15

## 2017-12-20 ENCOUNTER — COMMUNICATION - HEALTHEAST (OUTPATIENT)
Dept: PALLIATIVE MEDICINE | Facility: OTHER | Age: 62
End: 2017-12-20

## 2018-01-04 ENCOUNTER — COMMUNICATION - HEALTHEAST (OUTPATIENT)
Dept: INTERNAL MEDICINE | Facility: CLINIC | Age: 63
End: 2018-01-04

## 2018-01-04 ENCOUNTER — COMMUNICATION - HEALTHEAST (OUTPATIENT)
Dept: SCHEDULING | Facility: CLINIC | Age: 63
End: 2018-01-04

## 2018-01-04 DIAGNOSIS — J20.9 BRONCHITIS WITH BRONCHOSPASM: ICD-10-CM

## 2018-01-19 ENCOUNTER — COMMUNICATION - HEALTHEAST (OUTPATIENT)
Dept: PALLIATIVE MEDICINE | Facility: CLINIC | Age: 63
End: 2018-01-19

## 2018-01-19 ENCOUNTER — COMMUNICATION - HEALTHEAST (OUTPATIENT)
Dept: INTERNAL MEDICINE | Facility: CLINIC | Age: 63
End: 2018-01-19

## 2018-01-19 DIAGNOSIS — G89.4 CHRONIC PAIN SYNDROME: ICD-10-CM

## 2018-01-19 DIAGNOSIS — M46.1 SACROILIITIS (H): ICD-10-CM

## 2018-01-30 ENCOUNTER — OFFICE VISIT - HEALTHEAST (OUTPATIENT)
Dept: INTERNAL MEDICINE | Facility: CLINIC | Age: 63
End: 2018-01-30

## 2018-01-30 DIAGNOSIS — J20.9 BRONCHITIS WITH BRONCHOSPASM: ICD-10-CM

## 2018-01-30 DIAGNOSIS — K21.00 GASTROESOPHAGEAL REFLUX DISEASE WITH ESOPHAGITIS: ICD-10-CM

## 2018-01-30 DIAGNOSIS — Z51.81 MEDICATION MONITORING ENCOUNTER: ICD-10-CM

## 2018-01-30 DIAGNOSIS — E83.10 DISORDER OF IRON METABOLISM: ICD-10-CM

## 2018-01-30 DIAGNOSIS — K59.00 CONSTIPATION, UNSPECIFIED CONSTIPATION TYPE: ICD-10-CM

## 2018-01-30 DIAGNOSIS — E53.8 VITAMIN B12 DEFICIENCY: ICD-10-CM

## 2018-01-30 DIAGNOSIS — L40.9 PSORIASIS: ICD-10-CM

## 2018-01-30 LAB
ALBUMIN SERPL-MCNC: 3.8 G/DL (ref 3.5–5)
ALP SERPL-CCNC: 97 U/L (ref 45–120)
ALT SERPL W P-5'-P-CCNC: 21 U/L (ref 0–45)
ANION GAP SERPL CALCULATED.3IONS-SCNC: 12 MMOL/L (ref 5–18)
AST SERPL W P-5'-P-CCNC: 23 U/L (ref 0–40)
BILIRUB SERPL-MCNC: 0.5 MG/DL (ref 0–1)
BUN SERPL-MCNC: 12 MG/DL (ref 8–22)
CALCIUM SERPL-MCNC: 9.8 MG/DL (ref 8.5–10.5)
CHLORIDE BLD-SCNC: 101 MMOL/L (ref 98–107)
CO2 SERPL-SCNC: 28 MMOL/L (ref 22–31)
CREAT SERPL-MCNC: 0.69 MG/DL (ref 0.6–1.1)
ERYTHROCYTE [DISTWIDTH] IN BLOOD BY AUTOMATED COUNT: 12 % (ref 11–14.5)
FERRITIN SERPL-MCNC: 38 NG/ML (ref 10–130)
GFR SERPL CREATININE-BSD FRML MDRD: >60 ML/MIN/1.73M2
GLUCOSE BLD-MCNC: 93 MG/DL (ref 70–125)
HCT VFR BLD AUTO: 47.9 % (ref 35–47)
HGB BLD-MCNC: 16.3 G/DL (ref 12–16)
MCH RBC QN AUTO: 32.5 PG (ref 27–34)
MCHC RBC AUTO-ENTMCNC: 34.1 G/DL (ref 32–36)
MCV RBC AUTO: 96 FL (ref 80–100)
PLATELET # BLD AUTO: 243 THOU/UL (ref 140–440)
PMV BLD AUTO: 6.1 FL (ref 7–10)
POTASSIUM BLD-SCNC: 4.2 MMOL/L (ref 3.5–5)
PROT SERPL-MCNC: 6.9 G/DL (ref 6–8)
RBC # BLD AUTO: 5.01 MILL/UL (ref 3.8–5.4)
SODIUM SERPL-SCNC: 141 MMOL/L (ref 136–145)
TSH SERPL DL<=0.005 MIU/L-ACNC: 5.9 UIU/ML (ref 0.3–5)
WBC: 6 THOU/UL (ref 4–11)

## 2018-01-31 ENCOUNTER — COMMUNICATION - HEALTHEAST (OUTPATIENT)
Dept: INTERNAL MEDICINE | Facility: CLINIC | Age: 63
End: 2018-01-31

## 2018-02-14 ENCOUNTER — OFFICE VISIT - HEALTHEAST (OUTPATIENT)
Dept: INTERNAL MEDICINE | Facility: CLINIC | Age: 63
End: 2018-02-14

## 2018-02-14 DIAGNOSIS — E66.9 OBESITY (BMI 35.0-39.9 WITHOUT COMORBIDITY): ICD-10-CM

## 2018-02-14 DIAGNOSIS — E83.10 DISORDER OF IRON METABOLISM: ICD-10-CM

## 2018-02-14 DIAGNOSIS — E89.0 POSTABLATIVE HYPOTHYROIDISM: ICD-10-CM

## 2018-02-14 LAB
ERYTHROCYTE [DISTWIDTH] IN BLOOD BY AUTOMATED COUNT: 11.2 % (ref 11–14.5)
HCT VFR BLD AUTO: 49.4 % (ref 35–47)
HGB BLD-MCNC: 17 G/DL (ref 12–16)
IRON SATN MFR SERPL: 42 % (ref 20–50)
IRON SERPL-MCNC: 149 UG/DL (ref 42–175)
MCH RBC QN AUTO: 33.2 PG (ref 27–34)
MCHC RBC AUTO-ENTMCNC: 34.5 G/DL (ref 32–36)
MCV RBC AUTO: 96 FL (ref 80–100)
PLATELET # BLD AUTO: 265 THOU/UL (ref 140–440)
PMV BLD AUTO: 6 FL (ref 7–10)
RBC # BLD AUTO: 5.12 MILL/UL (ref 3.8–5.4)
TIBC SERPL-MCNC: 356 UG/DL (ref 313–563)
TRANSFERRIN SERPL-MCNC: 285 MG/DL (ref 212–360)
TSH SERPL DL<=0.005 MIU/L-ACNC: 0.62 UIU/ML (ref 0.3–5)
WBC: 9.2 THOU/UL (ref 4–11)

## 2018-02-16 ENCOUNTER — HOSPITAL ENCOUNTER (OUTPATIENT)
Dept: PALLIATIVE MEDICINE | Facility: OTHER | Age: 63
Discharge: HOME | End: 2018-02-16
Attending: NURSE PRACTITIONER

## 2018-02-16 DIAGNOSIS — M46.1 SACROILIITIS (H): ICD-10-CM

## 2018-02-16 DIAGNOSIS — G89.4 CHRONIC PAIN SYNDROME: ICD-10-CM

## 2018-02-16 ASSESSMENT — MIFFLIN-ST. JEOR: SCORE: 1647.19

## 2018-02-21 LAB
INTERPRETATION - HISTORICAL: NORMAL
PROVIDER SIGNING NAME: NORMAL
REF LAB TEST METHOD: NORMAL
RESULT - HISTORICAL: NORMAL
RESULT SUMMARY - HISTORICAL: NORMAL
SPECIMEN SOURCE: NORMAL

## 2018-02-26 ENCOUNTER — COMMUNICATION - HEALTHEAST (OUTPATIENT)
Dept: PALLIATIVE MEDICINE | Facility: OTHER | Age: 63
End: 2018-02-26

## 2018-03-12 ENCOUNTER — AMBULATORY - HEALTHEAST (OUTPATIENT)
Dept: PALLIATIVE MEDICINE | Facility: OTHER | Age: 63
End: 2018-03-12

## 2018-04-09 ENCOUNTER — COMMUNICATION - HEALTHEAST (OUTPATIENT)
Dept: PALLIATIVE MEDICINE | Facility: OTHER | Age: 63
End: 2018-04-09

## 2018-04-12 ENCOUNTER — HOSPITAL ENCOUNTER (OUTPATIENT)
Dept: PALLIATIVE MEDICINE | Facility: OTHER | Age: 63
Discharge: HOME | End: 2018-04-12
Attending: NURSE PRACTITIONER

## 2018-04-12 DIAGNOSIS — M46.1 SACROILIITIS (H): ICD-10-CM

## 2018-04-12 DIAGNOSIS — G89.4 CHRONIC PAIN SYNDROME: ICD-10-CM

## 2018-04-12 ASSESSMENT — MIFFLIN-ST. JEOR: SCORE: 1640.38

## 2018-04-14 ENCOUNTER — RECORDS - HEALTHEAST (OUTPATIENT)
Dept: ADMINISTRATIVE | Facility: OTHER | Age: 63
End: 2018-04-14

## 2018-04-19 ENCOUNTER — COMMUNICATION - HEALTHEAST (OUTPATIENT)
Dept: INTERNAL MEDICINE | Facility: CLINIC | Age: 63
End: 2018-04-19

## 2018-05-10 ENCOUNTER — COMMUNICATION - HEALTHEAST (OUTPATIENT)
Dept: PALLIATIVE MEDICINE | Facility: CLINIC | Age: 63
End: 2018-05-10

## 2018-05-10 DIAGNOSIS — M46.1 SACROILIITIS (H): ICD-10-CM

## 2018-05-10 DIAGNOSIS — G89.4 CHRONIC PAIN SYNDROME: ICD-10-CM

## 2018-05-14 ENCOUNTER — RECORDS - HEALTHEAST (OUTPATIENT)
Dept: ADMINISTRATIVE | Facility: OTHER | Age: 63
End: 2018-05-14

## 2018-05-14 ENCOUNTER — HOSPITAL ENCOUNTER (OUTPATIENT)
Dept: PALLIATIVE MEDICINE | Facility: OTHER | Age: 63
Discharge: HOME | End: 2018-05-14
Attending: NURSE PRACTITIONER

## 2018-05-14 DIAGNOSIS — M46.1 SACROILIITIS (H): ICD-10-CM

## 2018-05-14 DIAGNOSIS — M79.671 RIGHT FOOT PAIN: ICD-10-CM

## 2018-05-14 DIAGNOSIS — M54.2 NECK PAIN: ICD-10-CM

## 2018-05-14 DIAGNOSIS — M79.10 MYALGIA: ICD-10-CM

## 2018-05-14 DIAGNOSIS — G89.4 CHRONIC PAIN SYNDROME: ICD-10-CM

## 2018-05-14 DIAGNOSIS — F41.1 ANXIETY STATE: ICD-10-CM

## 2018-05-14 ASSESSMENT — MIFFLIN-ST. JEOR: SCORE: 1640.38

## 2018-05-22 ENCOUNTER — COMMUNICATION - HEALTHEAST (OUTPATIENT)
Dept: INTERNAL MEDICINE | Facility: CLINIC | Age: 63
End: 2018-05-22

## 2018-05-22 DIAGNOSIS — J20.9 BRONCHITIS WITH BRONCHOSPASM: ICD-10-CM

## 2018-05-23 ENCOUNTER — COMMUNICATION - HEALTHEAST (OUTPATIENT)
Dept: SCHEDULING | Facility: CLINIC | Age: 63
End: 2018-05-23

## 2018-05-24 ENCOUNTER — COMMUNICATION - HEALTHEAST (OUTPATIENT)
Dept: INTERNAL MEDICINE | Facility: CLINIC | Age: 63
End: 2018-05-24

## 2018-05-25 ENCOUNTER — OFFICE VISIT - HEALTHEAST (OUTPATIENT)
Dept: INTERNAL MEDICINE | Facility: CLINIC | Age: 63
End: 2018-05-25

## 2018-05-25 DIAGNOSIS — J44.1 COPD EXACERBATION (H): ICD-10-CM

## 2018-05-25 DIAGNOSIS — J06.9 VIRAL UPPER RESPIRATORY TRACT INFECTION: ICD-10-CM

## 2018-05-25 ASSESSMENT — MIFFLIN-ST. JEOR: SCORE: 1635.85

## 2018-05-31 ENCOUNTER — RECORDS - HEALTHEAST (OUTPATIENT)
Dept: ADMINISTRATIVE | Facility: OTHER | Age: 63
End: 2018-05-31

## 2018-06-15 ENCOUNTER — HOSPITAL ENCOUNTER (OUTPATIENT)
Dept: PALLIATIVE MEDICINE | Facility: OTHER | Age: 63
Discharge: HOME | End: 2018-06-15
Attending: NURSE PRACTITIONER

## 2018-06-15 DIAGNOSIS — G89.4 CHRONIC PAIN SYNDROME: ICD-10-CM

## 2018-06-15 DIAGNOSIS — M46.1 SACROILIITIS (H): ICD-10-CM

## 2018-06-15 ASSESSMENT — MIFFLIN-ST. JEOR: SCORE: 1640.38

## 2018-06-17 ENCOUNTER — COMMUNICATION - HEALTHEAST (OUTPATIENT)
Dept: INTERNAL MEDICINE | Facility: CLINIC | Age: 63
End: 2018-06-17

## 2018-06-17 DIAGNOSIS — E89.0 POSTABLATIVE HYPOTHYROIDISM: ICD-10-CM

## 2018-06-22 ENCOUNTER — RECORDS - HEALTHEAST (OUTPATIENT)
Dept: ADMINISTRATIVE | Facility: OTHER | Age: 63
End: 2018-06-22

## 2018-07-18 ENCOUNTER — COMMUNICATION - HEALTHEAST (OUTPATIENT)
Dept: PALLIATIVE MEDICINE | Facility: OTHER | Age: 63
End: 2018-07-18

## 2018-07-18 DIAGNOSIS — G89.4 CHRONIC PAIN SYNDROME: ICD-10-CM

## 2018-07-18 DIAGNOSIS — M46.1 SACROILIITIS (H): ICD-10-CM

## 2018-08-07 ENCOUNTER — OFFICE VISIT - HEALTHEAST (OUTPATIENT)
Dept: INTERNAL MEDICINE | Facility: CLINIC | Age: 63
End: 2018-08-07

## 2018-08-07 DIAGNOSIS — L30.9 DERMATITIS: ICD-10-CM

## 2018-08-07 DIAGNOSIS — R53.83 FATIGUE: ICD-10-CM
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
## 2018-08-07 DIAGNOSIS — E83.110 HEREDITARY HEMOCHROMATOSIS (H): ICD-10-CM

## 2018-08-07 DIAGNOSIS — L40.9 PSORIASIS: ICD-10-CM

## 2018-08-07 LAB
ALBUMIN SERPL-MCNC: 3.8 G/DL (ref 3.5–5)
ALP SERPL-CCNC: 84 U/L (ref 45–120)
ALT SERPL W P-5'-P-CCNC: 17 U/L (ref 0–45)
ANION GAP SERPL CALCULATED.3IONS-SCNC: 13 MMOL/L (ref 5–18)
AST SERPL W P-5'-P-CCNC: 19 U/L (ref 0–40)
BILIRUB SERPL-MCNC: 0.4 MG/DL (ref 0–1)
BUN SERPL-MCNC: 12 MG/DL (ref 8–22)
CALCIUM SERPL-MCNC: 9.1 MG/DL (ref 8.5–10.5)
CHLORIDE BLD-SCNC: 106 MMOL/L (ref 98–107)
CO2 SERPL-SCNC: 24 MMOL/L (ref 22–31)
CREAT SERPL-MCNC: 0.67 MG/DL (ref 0.6–1.1)
ERYTHROCYTE [DISTWIDTH] IN BLOOD BY AUTOMATED COUNT: 11.1 % (ref 11–14.5)
FERRITIN SERPL-MCNC: 28 NG/ML (ref 10–130)
GFR SERPL CREATININE-BSD FRML MDRD: >60 ML/MIN/1.73M2
GLUCOSE BLD-MCNC: 108 MG/DL (ref 70–125)
HCT VFR BLD AUTO: 46.9 % (ref 35–47)
HGB BLD-MCNC: 16 G/DL (ref 12–16)
MCH RBC QN AUTO: 33 PG (ref 27–34)
MCHC RBC AUTO-ENTMCNC: 34.1 G/DL (ref 32–36)
MCV RBC AUTO: 97 FL (ref 80–100)
PLATELET # BLD AUTO: 208 THOU/UL (ref 140–440)
PMV BLD AUTO: 6.3 FL (ref 7–10)
POTASSIUM BLD-SCNC: 4.2 MMOL/L (ref 3.5–5)
PROT SERPL-MCNC: 6.6 G/DL (ref 6–8)
RBC # BLD AUTO: 4.84 MILL/UL (ref 3.8–5.4)
SODIUM SERPL-SCNC: 143 MMOL/L (ref 136–145)
TSH SERPL DL<=0.005 MIU/L-ACNC: 1.15 UIU/ML (ref 0.3–5)
VIT B12 SERPL-MCNC: 339 PG/ML (ref 213–816)
WBC: 5.2 THOU/UL (ref 4–11)

## 2018-08-08 LAB
25(OH)D3 SERPL-MCNC: 32.2 NG/ML (ref 30–80)
25(OH)D3 SERPL-MCNC: 32.2 NG/ML (ref 30–80)
B BURGDOR IGG+IGM SER QL: 0.03 INDEX VALUE

## 2018-08-10 ENCOUNTER — HOSPITAL ENCOUNTER (OUTPATIENT)
Dept: PALLIATIVE MEDICINE | Facility: OTHER | Age: 63
Discharge: HOME | End: 2018-08-10
Attending: NURSE PRACTITIONER

## 2018-08-10 DIAGNOSIS — M46.1 SACROILIITIS (H): ICD-10-CM

## 2018-08-10 DIAGNOSIS — G89.4 CHRONIC PAIN SYNDROME: ICD-10-CM

## 2018-08-10 ASSESSMENT — MIFFLIN-ST. JEOR: SCORE: 1644.92

## 2018-08-15 ENCOUNTER — COMMUNICATION - HEALTHEAST (OUTPATIENT)
Dept: PALLIATIVE MEDICINE | Facility: CLINIC | Age: 63
End: 2018-08-15

## 2018-08-28 ENCOUNTER — HOSPITAL ENCOUNTER (OUTPATIENT)
Dept: PALLIATIVE MEDICINE | Facility: OTHER | Age: 63
Discharge: HOME | End: 2018-08-28
Attending: NURSE PRACTITIONER

## 2018-08-28 DIAGNOSIS — M46.1 SACROILIITIS (H): ICD-10-CM

## 2018-08-28 DIAGNOSIS — G89.4 CHRONIC PAIN SYNDROME: ICD-10-CM

## 2018-08-28 DIAGNOSIS — M79.10 MYALGIA: ICD-10-CM

## 2018-08-28 DIAGNOSIS — M54.2 NECK PAIN: ICD-10-CM

## 2018-08-28 DIAGNOSIS — G89.29 CHRONIC RIGHT SHOULDER PAIN: ICD-10-CM

## 2018-08-28 DIAGNOSIS — M25.511 CHRONIC RIGHT SHOULDER PAIN: ICD-10-CM

## 2018-08-28 ASSESSMENT — MIFFLIN-ST. JEOR: SCORE: 1595.02

## 2018-09-18 ENCOUNTER — COMMUNICATION - HEALTHEAST (OUTPATIENT)
Dept: INTERNAL MEDICINE | Facility: CLINIC | Age: 63
End: 2018-09-18

## 2018-09-18 DIAGNOSIS — E89.0 POSTABLATIVE HYPOTHYROIDISM: ICD-10-CM

## 2018-10-16 ENCOUNTER — HOSPITAL ENCOUNTER (OUTPATIENT)
Dept: PALLIATIVE MEDICINE | Facility: OTHER | Age: 63
Discharge: HOME | End: 2018-10-16
Attending: NURSE PRACTITIONER

## 2018-10-16 DIAGNOSIS — M54.2 NECK PAIN: ICD-10-CM

## 2018-10-16 DIAGNOSIS — M46.1 SACROILIITIS (H): ICD-10-CM

## 2018-10-16 DIAGNOSIS — Z79.891 OPIOID USE AGREEMENT EXISTS: ICD-10-CM

## 2018-10-16 DIAGNOSIS — G89.4 CHRONIC PAIN SYNDROME: ICD-10-CM

## 2018-10-16 ASSESSMENT — MIFFLIN-ST. JEOR: SCORE: 1595.02

## 2018-10-17 ENCOUNTER — RECORDS - HEALTHEAST (OUTPATIENT)
Dept: ADMINISTRATIVE | Facility: OTHER | Age: 63
End: 2018-10-17

## 2018-11-06 ENCOUNTER — RECORDS - HEALTHEAST (OUTPATIENT)
Dept: ADMINISTRATIVE | Facility: OTHER | Age: 63
End: 2018-11-06

## 2018-11-15 ENCOUNTER — AMBULATORY - HEALTHEAST (OUTPATIENT)
Dept: PALLIATIVE MEDICINE | Facility: OTHER | Age: 63
End: 2018-11-15

## 2018-11-26 ENCOUNTER — RECORDS - HEALTHEAST (OUTPATIENT)
Dept: ADMINISTRATIVE | Facility: OTHER | Age: 63
End: 2018-11-26

## 2018-11-27 ENCOUNTER — HOSPITAL ENCOUNTER (OUTPATIENT)
Dept: PALLIATIVE MEDICINE | Facility: OTHER | Age: 63
Discharge: HOME | End: 2018-11-27
Attending: NURSE PRACTITIONER

## 2018-11-27 DIAGNOSIS — M54.2 NECK PAIN: ICD-10-CM

## 2018-11-27 DIAGNOSIS — G89.4 CHRONIC PAIN SYNDROME: ICD-10-CM

## 2018-11-27 DIAGNOSIS — M25.512 BILATERAL SHOULDER PAIN, UNSPECIFIED CHRONICITY: ICD-10-CM

## 2018-11-27 DIAGNOSIS — M25.511 BILATERAL SHOULDER PAIN, UNSPECIFIED CHRONICITY: ICD-10-CM

## 2018-11-27 ASSESSMENT — MIFFLIN-ST. JEOR: SCORE: 1640.38

## 2018-12-05 ENCOUNTER — HOSPITAL ENCOUNTER (OUTPATIENT)
Dept: PALLIATIVE MEDICINE | Facility: OTHER | Age: 63
Discharge: HOME | End: 2018-12-05
Attending: SOCIAL WORKER

## 2018-12-05 DIAGNOSIS — F41.1 GENERALIZED ANXIETY DISORDER: ICD-10-CM

## 2018-12-05 DIAGNOSIS — F33.2 SEVERE RECURRENT MAJOR DEPRESSION WITHOUT PSYCHOTIC FEATURES (H): ICD-10-CM

## 2018-12-05 DIAGNOSIS — F43.12 CHRONIC POST-TRAUMATIC STRESS DISORDER (PTSD): ICD-10-CM

## 2018-12-06 ENCOUNTER — RECORDS - HEALTHEAST (OUTPATIENT)
Dept: ADMINISTRATIVE | Facility: OTHER | Age: 63
End: 2018-12-06

## 2018-12-19 ENCOUNTER — OFFICE VISIT - HEALTHEAST (OUTPATIENT)
Dept: INTERNAL MEDICINE | Facility: CLINIC | Age: 63
End: 2018-12-19

## 2018-12-19 DIAGNOSIS — K21.00 GASTROESOPHAGEAL REFLUX DISEASE WITH ESOPHAGITIS: ICD-10-CM

## 2018-12-19 DIAGNOSIS — J44.9 CHRONIC OBSTRUCTIVE PULMONARY DISEASE, UNSPECIFIED COPD TYPE (H): ICD-10-CM

## 2018-12-19 DIAGNOSIS — Z00.00 PREVENTATIVE HEALTH CARE: ICD-10-CM

## 2018-12-19 DIAGNOSIS — E66.01 MORBID OBESITY (H): ICD-10-CM

## 2018-12-19 DIAGNOSIS — L40.50 PSORIATIC ARTHROPATHY (H): ICD-10-CM

## 2018-12-19 DIAGNOSIS — L40.8 OTHER PSORIASIS: ICD-10-CM

## 2018-12-19 DIAGNOSIS — J44.1 CHRONIC OBSTRUCTIVE PULMONARY DISEASE WITH ACUTE EXACERBATION (H): ICD-10-CM

## 2018-12-19 DIAGNOSIS — E55.9 VITAMIN D DEFICIENCY: ICD-10-CM

## 2018-12-19 DIAGNOSIS — E53.8 VITAMIN B12 DEFICIENCY: ICD-10-CM

## 2018-12-19 DIAGNOSIS — J95.89 ACUTE POSTOPERATIVE RESPIRATORY INSUFFICIENCY: ICD-10-CM

## 2018-12-19 DIAGNOSIS — E83.10 DISORDER OF IRON METABOLISM: ICD-10-CM

## 2018-12-19 DIAGNOSIS — E89.0 POSTABLATIVE HYPOTHYROIDISM: ICD-10-CM

## 2018-12-19 LAB
ALBUMIN SERPL-MCNC: 3.7 G/DL (ref 3.5–5)
ALP SERPL-CCNC: 98 U/L (ref 45–120)
ALT SERPL W P-5'-P-CCNC: 40 U/L (ref 0–45)
ANION GAP SERPL CALCULATED.3IONS-SCNC: 14 MMOL/L (ref 5–18)
AST SERPL W P-5'-P-CCNC: 30 U/L (ref 0–40)
BILIRUB SERPL-MCNC: 0.4 MG/DL (ref 0–1)
BUN SERPL-MCNC: 11 MG/DL (ref 8–22)
CALCIUM SERPL-MCNC: 9.5 MG/DL (ref 8.5–10.5)
CHLORIDE BLD-SCNC: 102 MMOL/L (ref 98–107)
CO2 SERPL-SCNC: 26 MMOL/L (ref 22–31)
CREAT SERPL-MCNC: 0.64 MG/DL (ref 0.6–1.1)
ERYTHROCYTE [DISTWIDTH] IN BLOOD BY AUTOMATED COUNT: 11.4 % (ref 11–14.5)
ERYTHROCYTE [SEDIMENTATION RATE] IN BLOOD BY WESTERGREN METHOD: 12 MM/HR (ref 0–20)
FERRITIN SERPL-MCNC: 35 NG/ML (ref 10–130)
GFR SERPL CREATININE-BSD FRML MDRD: >60 ML/MIN/1.73M2
GLUCOSE BLD-MCNC: 100 MG/DL (ref 70–125)
HCT VFR BLD AUTO: 47.2 % (ref 35–47)
HGB BLD-MCNC: 16.4 G/DL (ref 12–16)
MCH RBC QN AUTO: 34 PG (ref 27–34)
MCHC RBC AUTO-ENTMCNC: 34.7 G/DL (ref 32–36)
MCV RBC AUTO: 98 FL (ref 80–100)
PLATELET # BLD AUTO: 201 THOU/UL (ref 140–440)
PMV BLD AUTO: 6.1 FL (ref 7–10)
POTASSIUM BLD-SCNC: 4.1 MMOL/L (ref 3.5–5)
PROT SERPL-MCNC: 6.7 G/DL (ref 6–8)
RBC # BLD AUTO: 4.81 MILL/UL (ref 3.8–5.4)
SODIUM SERPL-SCNC: 142 MMOL/L (ref 136–145)
TSH SERPL DL<=0.005 MIU/L-ACNC: 3.19 UIU/ML (ref 0.3–5)
VIT B12 SERPL-MCNC: 417 PG/ML (ref 213–816)
WBC: 7.2 THOU/UL (ref 4–11)

## 2018-12-20 LAB
25(OH)D3 SERPL-MCNC: 39.5 NG/ML (ref 30–80)
25(OH)D3 SERPL-MCNC: 39.5 NG/ML (ref 30–80)

## 2018-12-28 ENCOUNTER — COMMUNICATION - HEALTHEAST (OUTPATIENT)
Dept: INTERNAL MEDICINE | Facility: CLINIC | Age: 63
End: 2018-12-28

## 2019-01-02 ENCOUNTER — RECORDS - HEALTHEAST (OUTPATIENT)
Dept: ADMINISTRATIVE | Facility: OTHER | Age: 64
End: 2019-01-02

## 2019-01-08 ENCOUNTER — RECORDS - HEALTHEAST (OUTPATIENT)
Dept: ADMINISTRATIVE | Facility: OTHER | Age: 64
End: 2019-01-08

## 2019-01-10 ENCOUNTER — HOSPITAL ENCOUNTER (OUTPATIENT)
Dept: PALLIATIVE MEDICINE | Facility: OTHER | Age: 64
Discharge: HOME | End: 2019-01-10
Attending: SOCIAL WORKER

## 2019-01-10 DIAGNOSIS — F41.1 GENERALIZED ANXIETY DISORDER: ICD-10-CM

## 2019-01-10 DIAGNOSIS — F33.2 SEVERE RECURRENT MAJOR DEPRESSION WITHOUT PSYCHOTIC FEATURES (H): ICD-10-CM

## 2019-01-10 DIAGNOSIS — G89.4 CHRONIC PAIN SYNDROME: ICD-10-CM

## 2019-01-10 DIAGNOSIS — F43.12 CHRONIC POST-TRAUMATIC STRESS DISORDER (PTSD): ICD-10-CM

## 2019-01-18 ENCOUNTER — COMMUNICATION - HEALTHEAST (OUTPATIENT)
Dept: PALLIATIVE MEDICINE | Facility: OTHER | Age: 64
End: 2019-01-18

## 2019-01-23 ENCOUNTER — HOSPITAL ENCOUNTER (OUTPATIENT)
Dept: PALLIATIVE MEDICINE | Facility: OTHER | Age: 64
Discharge: HOME | End: 2019-01-23
Attending: SOCIAL WORKER

## 2019-01-23 DIAGNOSIS — G89.4 CHRONIC PAIN SYNDROME: ICD-10-CM

## 2019-01-23 DIAGNOSIS — F41.1 GENERALIZED ANXIETY DISORDER: ICD-10-CM

## 2019-01-23 DIAGNOSIS — F33.2 SEVERE RECURRENT MAJOR DEPRESSION WITHOUT PSYCHOTIC FEATURES (H): ICD-10-CM

## 2019-01-23 DIAGNOSIS — F43.12 CHRONIC POST-TRAUMATIC STRESS DISORDER (PTSD): ICD-10-CM

## 2019-01-29 ENCOUNTER — COMMUNICATION - HEALTHEAST (OUTPATIENT)
Dept: PALLIATIVE MEDICINE | Facility: OTHER | Age: 64
End: 2019-01-29

## 2019-01-29 DIAGNOSIS — M54.2 NECK PAIN: ICD-10-CM

## 2019-01-29 DIAGNOSIS — G89.4 CHRONIC PAIN SYNDROME: ICD-10-CM

## 2019-01-31 ENCOUNTER — COMMUNICATION - HEALTHEAST (OUTPATIENT)
Dept: PALLIATIVE MEDICINE | Facility: OTHER | Age: 64
End: 2019-01-31

## 2019-01-31 DIAGNOSIS — G89.4 CHRONIC PAIN SYNDROME: ICD-10-CM

## 2019-01-31 DIAGNOSIS — M54.2 NECK PAIN: ICD-10-CM

## 2019-02-01 ENCOUNTER — COMMUNICATION - HEALTHEAST (OUTPATIENT)
Dept: PALLIATIVE MEDICINE | Facility: OTHER | Age: 64
End: 2019-02-01

## 2019-02-01 DIAGNOSIS — M54.2 NECK PAIN: ICD-10-CM

## 2019-02-01 DIAGNOSIS — G89.4 CHRONIC PAIN SYNDROME: ICD-10-CM

## 2019-02-05 ENCOUNTER — COMMUNICATION - HEALTHEAST (OUTPATIENT)
Dept: INTERNAL MEDICINE | Facility: CLINIC | Age: 64
End: 2019-02-05

## 2019-02-06 ENCOUNTER — HOSPITAL ENCOUNTER (OUTPATIENT)
Dept: PALLIATIVE MEDICINE | Facility: OTHER | Age: 64
Discharge: HOME | End: 2019-02-06
Attending: SOCIAL WORKER

## 2019-02-06 ENCOUNTER — AMBULATORY - HEALTHEAST (OUTPATIENT)
Dept: PALLIATIVE MEDICINE | Facility: OTHER | Age: 64
End: 2019-02-06

## 2019-02-06 DIAGNOSIS — G89.4 CHRONIC PAIN SYNDROME: ICD-10-CM

## 2019-02-06 DIAGNOSIS — F33.2 SEVERE RECURRENT MAJOR DEPRESSION WITHOUT PSYCHOTIC FEATURES (H): ICD-10-CM

## 2019-02-06 DIAGNOSIS — F41.1 GENERALIZED ANXIETY DISORDER: ICD-10-CM

## 2019-02-06 DIAGNOSIS — F43.12 CHRONIC POST-TRAUMATIC STRESS DISORDER (PTSD): ICD-10-CM

## 2019-02-13 ENCOUNTER — OFFICE VISIT - HEALTHEAST (OUTPATIENT)
Dept: INTERNAL MEDICINE | Facility: CLINIC | Age: 64
End: 2019-02-13

## 2019-02-13 DIAGNOSIS — E66.01 MORBID OBESITY (H): ICD-10-CM

## 2019-02-13 DIAGNOSIS — G89.4 CHRONIC PAIN SYNDROME: ICD-10-CM

## 2019-02-13 DIAGNOSIS — J44.1 CHRONIC OBSTRUCTIVE PULMONARY DISEASE WITH ACUTE EXACERBATION (H): ICD-10-CM

## 2019-02-13 DIAGNOSIS — L40.50 PSORIATIC ARTHRITIS (H): ICD-10-CM

## 2019-02-13 DIAGNOSIS — E83.110 HEREDITARY HEMOCHROMATOSIS (H): ICD-10-CM

## 2019-02-13 ASSESSMENT — MIFFLIN-ST. JEOR: SCORE: 1662.5

## 2019-02-15 ENCOUNTER — COMMUNICATION - HEALTHEAST (OUTPATIENT)
Dept: PALLIATIVE MEDICINE | Facility: OTHER | Age: 64
End: 2019-02-15

## 2019-02-15 ENCOUNTER — HOSPITAL ENCOUNTER (OUTPATIENT)
Dept: PALLIATIVE MEDICINE | Facility: OTHER | Age: 64
Discharge: HOME | End: 2019-02-15
Attending: NURSE PRACTITIONER

## 2019-02-15 DIAGNOSIS — G89.4 CHRONIC PAIN SYNDROME: ICD-10-CM

## 2019-02-15 DIAGNOSIS — Z79.891 OPIOID USE AGREEMENT EXISTS: ICD-10-CM

## 2019-02-15 DIAGNOSIS — M54.2 NECK PAIN: ICD-10-CM

## 2019-02-15 ASSESSMENT — MIFFLIN-ST. JEOR: SCORE: 1663.06

## 2019-02-18 ENCOUNTER — COMMUNICATION - HEALTHEAST (OUTPATIENT)
Dept: PALLIATIVE MEDICINE | Facility: OTHER | Age: 64
End: 2019-02-18

## 2019-02-18 DIAGNOSIS — M54.2 NECK PAIN: ICD-10-CM

## 2019-02-18 DIAGNOSIS — G89.4 CHRONIC PAIN SYNDROME: ICD-10-CM

## 2019-02-19 ENCOUNTER — OFFICE VISIT - HEALTHEAST (OUTPATIENT)
Dept: INTERNAL MEDICINE | Facility: CLINIC | Age: 64
End: 2019-02-19

## 2019-02-19 ENCOUNTER — RECORDS - HEALTHEAST (OUTPATIENT)
Dept: ADMINISTRATIVE | Facility: OTHER | Age: 64
End: 2019-02-19

## 2019-02-19 DIAGNOSIS — Z01.818 PREOP EXAM FOR INTERNAL MEDICINE: ICD-10-CM

## 2019-02-19 DIAGNOSIS — J44.9 CHRONIC OBSTRUCTIVE PULMONARY DISEASE, UNSPECIFIED COPD TYPE (H): ICD-10-CM

## 2019-02-19 DIAGNOSIS — E83.10 DISORDER OF IRON METABOLISM: ICD-10-CM

## 2019-02-19 DIAGNOSIS — G47.33 SLEEP APNEA, OBSTRUCTIVE: ICD-10-CM

## 2019-02-19 DIAGNOSIS — J95.89 ACUTE POSTOPERATIVE RESPIRATORY INSUFFICIENCY: ICD-10-CM

## 2019-02-19 LAB
ANION GAP SERPL CALCULATED.3IONS-SCNC: 11 MMOL/L (ref 5–18)
ATRIAL RATE - MUSE: 100 BPM
BUN SERPL-MCNC: 14 MG/DL (ref 8–22)
CALCIUM SERPL-MCNC: 9.1 MG/DL (ref 8.5–10.5)
CHLORIDE BLD-SCNC: 106 MMOL/L (ref 98–107)
CO2 SERPL-SCNC: 25 MMOL/L (ref 22–31)
CREAT SERPL-MCNC: 0.77 MG/DL (ref 0.6–1.1)
DIASTOLIC BLOOD PRESSURE - MUSE: NORMAL MMHG
ERYTHROCYTE [DISTWIDTH] IN BLOOD BY AUTOMATED COUNT: 11.7 % (ref 11–14.5)
GFR SERPL CREATININE-BSD FRML MDRD: >60 ML/MIN/1.73M2
GLUCOSE BLD-MCNC: 110 MG/DL (ref 70–125)
HCT VFR BLD AUTO: 50.1 % (ref 35–47)
HGB BLD-MCNC: 16.7 G/DL (ref 12–16)
INTERPRETATION ECG - MUSE: NORMAL
MCH RBC QN AUTO: 33.6 PG (ref 27–34)
MCHC RBC AUTO-ENTMCNC: 33.2 G/DL (ref 32–36)
MCV RBC AUTO: 101 FL (ref 80–100)
P AXIS - MUSE: 54 DEGREES
PLATELET # BLD AUTO: 228 THOU/UL (ref 140–440)
PMV BLD AUTO: 6.3 FL (ref 7–10)
POTASSIUM BLD-SCNC: 4 MMOL/L (ref 3.5–5)
PR INTERVAL - MUSE: 146 MS
QRS DURATION - MUSE: 112 MS
QT - MUSE: 358 MS
QTC - MUSE: 461 MS
R AXIS - MUSE: 82 DEGREES
RBC # BLD AUTO: 4.96 MILL/UL (ref 3.8–5.4)
SODIUM SERPL-SCNC: 142 MMOL/L (ref 136–145)
SYSTOLIC BLOOD PRESSURE - MUSE: NORMAL MMHG
T AXIS - MUSE: 24 DEGREES
VENTRICULAR RATE- MUSE: 100 BPM
WBC: 8.7 THOU/UL (ref 4–11)

## 2019-02-19 ASSESSMENT — MIFFLIN-ST. JEOR: SCORE: 1673.84

## 2019-02-20 ENCOUNTER — RECORDS - HEALTHEAST (OUTPATIENT)
Dept: ADMINISTRATIVE | Facility: OTHER | Age: 64
End: 2019-02-20

## 2019-02-21 ENCOUNTER — RECORDS - HEALTHEAST (OUTPATIENT)
Dept: ADMINISTRATIVE | Facility: OTHER | Age: 64
End: 2019-02-21

## 2019-02-21 ENCOUNTER — HOSPITAL ENCOUNTER (OUTPATIENT)
Dept: PALLIATIVE MEDICINE | Facility: OTHER | Age: 64
Discharge: HOME | End: 2019-02-21
Attending: SOCIAL WORKER

## 2019-02-21 DIAGNOSIS — G89.4 CHRONIC PAIN SYNDROME: ICD-10-CM

## 2019-02-21 DIAGNOSIS — F33.2 SEVERE RECURRENT MAJOR DEPRESSION WITHOUT PSYCHOTIC FEATURES (H): ICD-10-CM

## 2019-02-21 DIAGNOSIS — F43.12 CHRONIC POST-TRAUMATIC STRESS DISORDER (PTSD): ICD-10-CM

## 2019-02-21 DIAGNOSIS — F41.1 GENERALIZED ANXIETY DISORDER: ICD-10-CM

## 2019-02-25 ENCOUNTER — COMMUNICATION - HEALTHEAST (OUTPATIENT)
Dept: PALLIATIVE MEDICINE | Facility: OTHER | Age: 64
End: 2019-02-25

## 2019-02-27 ENCOUNTER — COMMUNICATION - HEALTHEAST (OUTPATIENT)
Dept: INTERNAL MEDICINE | Facility: CLINIC | Age: 64
End: 2019-02-27

## 2019-02-27 ENCOUNTER — COMMUNICATION - HEALTHEAST (OUTPATIENT)
Dept: PALLIATIVE MEDICINE | Facility: OTHER | Age: 64
End: 2019-02-27

## 2019-03-08 ENCOUNTER — COMMUNICATION - HEALTHEAST (OUTPATIENT)
Dept: PALLIATIVE MEDICINE | Facility: OTHER | Age: 64
End: 2019-03-08

## 2019-03-08 DIAGNOSIS — G89.4 CHRONIC PAIN SYNDROME: ICD-10-CM

## 2019-03-08 DIAGNOSIS — M54.2 NECK PAIN: ICD-10-CM

## 2019-03-19 ENCOUNTER — HOSPITAL ENCOUNTER (OUTPATIENT)
Dept: PALLIATIVE MEDICINE | Facility: OTHER | Age: 64
Discharge: HOME | End: 2019-03-19
Attending: NURSE PRACTITIONER

## 2019-03-19 DIAGNOSIS — G89.4 CHRONIC PAIN SYNDROME: ICD-10-CM

## 2019-03-19 DIAGNOSIS — M54.2 NECK PAIN: ICD-10-CM

## 2019-03-19 ASSESSMENT — MIFFLIN-ST. JEOR: SCORE: 1640.38

## 2019-03-29 ENCOUNTER — COMMUNICATION - HEALTHEAST (OUTPATIENT)
Dept: INTERNAL MEDICINE | Facility: CLINIC | Age: 64
End: 2019-03-29

## 2019-03-30 ENCOUNTER — COMMUNICATION - HEALTHEAST (OUTPATIENT)
Dept: INTERNAL MEDICINE | Facility: CLINIC | Age: 64
End: 2019-03-30

## 2019-03-30 DIAGNOSIS — D75.89 MACROCYTOSIS WITHOUT ANEMIA: ICD-10-CM

## 2019-04-04 ENCOUNTER — HOSPITAL ENCOUNTER (OUTPATIENT)
Dept: PALLIATIVE MEDICINE | Facility: OTHER | Age: 64
Discharge: HOME | End: 2019-04-04
Attending: NURSE PRACTITIONER

## 2019-04-04 DIAGNOSIS — M25.511 ACUTE PAIN OF RIGHT SHOULDER: ICD-10-CM

## 2019-04-04 DIAGNOSIS — G89.29 CHRONIC INTRACTABLE PAIN: ICD-10-CM

## 2019-04-04 DIAGNOSIS — F41.9 ANXIETY: ICD-10-CM

## 2019-04-04 DIAGNOSIS — G89.4 CHRONIC PAIN SYNDROME: ICD-10-CM

## 2019-04-04 DIAGNOSIS — L40.50 PSORIATIC ARTHROPATHY (H): ICD-10-CM

## 2019-04-04 DIAGNOSIS — M54.2 NECK PAIN: ICD-10-CM

## 2019-04-04 ASSESSMENT — MIFFLIN-ST. JEOR: SCORE: 1685.74

## 2019-04-09 ENCOUNTER — RECORDS - HEALTHEAST (OUTPATIENT)
Dept: ADMINISTRATIVE | Facility: OTHER | Age: 64
End: 2019-04-09

## 2019-04-09 ENCOUNTER — COMMUNICATION - HEALTHEAST (OUTPATIENT)
Dept: INTERNAL MEDICINE | Facility: CLINIC | Age: 64
End: 2019-04-09

## 2019-04-09 ENCOUNTER — OFFICE VISIT - HEALTHEAST (OUTPATIENT)
Dept: INTERNAL MEDICINE | Facility: CLINIC | Age: 64
End: 2019-04-09

## 2019-04-09 DIAGNOSIS — E83.110 HEREDITARY HEMOCHROMATOSIS (H): ICD-10-CM

## 2019-04-09 DIAGNOSIS — J44.9 CHRONIC OBSTRUCTIVE PULMONARY DISEASE, UNSPECIFIED COPD TYPE (H): ICD-10-CM

## 2019-04-09 DIAGNOSIS — E89.0 POSTABLATIVE HYPOTHYROIDISM: ICD-10-CM

## 2019-04-09 DIAGNOSIS — L40.9 PSORIASIS: ICD-10-CM

## 2019-04-09 DIAGNOSIS — G47.33 SLEEP APNEA, OBSTRUCTIVE: ICD-10-CM

## 2019-04-09 DIAGNOSIS — K21.9 GASTROESOPHAGEAL REFLUX DISEASE WITHOUT ESOPHAGITIS: ICD-10-CM

## 2019-04-09 DIAGNOSIS — Z01.818 PREOP GENERAL PHYSICAL EXAM: ICD-10-CM

## 2019-04-09 DIAGNOSIS — M75.122 COMPLETE TEAR OF LEFT ROTATOR CUFF: ICD-10-CM

## 2019-04-09 DIAGNOSIS — J95.89 ACUTE POSTOPERATIVE RESPIRATORY INSUFFICIENCY: ICD-10-CM

## 2019-04-09 DIAGNOSIS — F11.90 CHRONIC, CONTINUOUS USE OF OPIOIDS: ICD-10-CM

## 2019-04-09 LAB
ANION GAP SERPL CALCULATED.3IONS-SCNC: 8 MMOL/L (ref 5–18)
BUN SERPL-MCNC: 12 MG/DL (ref 8–22)
CALCIUM SERPL-MCNC: 9.7 MG/DL (ref 8.5–10.5)
CHLORIDE BLD-SCNC: 102 MMOL/L (ref 98–107)
CO2 SERPL-SCNC: 32 MMOL/L (ref 22–31)
CREAT SERPL-MCNC: 0.73 MG/DL (ref 0.6–1.1)
ERYTHROCYTE [DISTWIDTH] IN BLOOD BY AUTOMATED COUNT: 10.9 % (ref 11–14.5)
GFR SERPL CREATININE-BSD FRML MDRD: >60 ML/MIN/1.73M2
GLUCOSE BLD-MCNC: 80 MG/DL (ref 70–125)
HCT VFR BLD AUTO: 51.6 % (ref 35–47)
HGB BLD-MCNC: 17.3 G/DL (ref 12–16)
MCH RBC QN AUTO: 35.4 PG (ref 27–34)
MCHC RBC AUTO-ENTMCNC: 33.5 G/DL (ref 32–36)
MCV RBC AUTO: 106 FL (ref 80–100)
PLATELET # BLD AUTO: 207 THOU/UL (ref 140–440)
PMV BLD AUTO: 6.3 FL (ref 7–10)
POTASSIUM BLD-SCNC: 4.3 MMOL/L (ref 3.5–5)
RBC # BLD AUTO: 4.88 MILL/UL (ref 3.8–5.4)
SODIUM SERPL-SCNC: 142 MMOL/L (ref 136–145)
WBC: 6.1 THOU/UL (ref 4–11)

## 2019-04-09 ASSESSMENT — MIFFLIN-ST. JEOR: SCORE: 1703.89

## 2019-04-12 ENCOUNTER — RECORDS - HEALTHEAST (OUTPATIENT)
Dept: ADMINISTRATIVE | Facility: OTHER | Age: 64
End: 2019-04-12

## 2019-04-18 ENCOUNTER — COMMUNICATION - HEALTHEAST (OUTPATIENT)
Dept: INTERNAL MEDICINE | Facility: CLINIC | Age: 64
End: 2019-04-18

## 2019-04-18 ENCOUNTER — ANESTHESIA - HEALTHEAST (OUTPATIENT)
Dept: SURGERY | Facility: CLINIC | Age: 64
End: 2019-04-18

## 2019-04-18 DIAGNOSIS — J41.0 SIMPLE CHRONIC BRONCHITIS (H): ICD-10-CM

## 2019-04-30 ENCOUNTER — COMMUNICATION - HEALTHEAST (OUTPATIENT)
Dept: INTERNAL MEDICINE | Facility: CLINIC | Age: 64
End: 2019-04-30

## 2019-05-10 ENCOUNTER — RECORDS - HEALTHEAST (OUTPATIENT)
Dept: ADMINISTRATIVE | Facility: OTHER | Age: 64
End: 2019-05-10

## 2019-05-28 ENCOUNTER — RECORDS - HEALTHEAST (OUTPATIENT)
Dept: ADMINISTRATIVE | Facility: OTHER | Age: 64
End: 2019-05-28

## 2019-05-28 ENCOUNTER — COMMUNICATION - HEALTHEAST (OUTPATIENT)
Dept: INTERNAL MEDICINE | Facility: CLINIC | Age: 64
End: 2019-05-28

## 2019-05-28 ENCOUNTER — OFFICE VISIT - HEALTHEAST (OUTPATIENT)
Dept: INTERNAL MEDICINE | Facility: CLINIC | Age: 64
End: 2019-05-28

## 2019-05-28 DIAGNOSIS — T14.8XXA HEMATOMA OF SKIN: ICD-10-CM

## 2019-05-28 DIAGNOSIS — J44.1 CHRONIC OBSTRUCTIVE PULMONARY DISEASE WITH ACUTE EXACERBATION (H): ICD-10-CM

## 2019-05-28 DIAGNOSIS — K21.00 GASTROESOPHAGEAL REFLUX DISEASE WITH ESOPHAGITIS: ICD-10-CM

## 2019-05-28 DIAGNOSIS — Z00.00 PREVENTATIVE HEALTH CARE: ICD-10-CM

## 2019-05-28 DIAGNOSIS — R79.89 ELEVATED LFTS: ICD-10-CM

## 2019-05-28 DIAGNOSIS — E66.01 MORBID OBESITY (H): ICD-10-CM

## 2019-05-28 DIAGNOSIS — J44.9 CHRONIC OBSTRUCTIVE PULMONARY DISEASE, UNSPECIFIED COPD TYPE (H): ICD-10-CM

## 2019-05-28 DIAGNOSIS — E83.110 HEREDITARY HEMOCHROMATOSIS (H): ICD-10-CM

## 2019-05-28 DIAGNOSIS — E89.0 POSTABLATIVE HYPOTHYROIDISM: ICD-10-CM

## 2019-05-28 DIAGNOSIS — Z01.818 PREOP GENERAL PHYSICAL EXAM: ICD-10-CM

## 2019-05-28 DIAGNOSIS — D75.89 MACROCYTOSIS WITHOUT ANEMIA: ICD-10-CM

## 2019-05-28 LAB
ALBUMIN SERPL-MCNC: 4 G/DL (ref 3.5–5)
ALP SERPL-CCNC: 98 U/L (ref 45–120)
ALT SERPL W P-5'-P-CCNC: 54 U/L (ref 0–45)
ANION GAP SERPL CALCULATED.3IONS-SCNC: 12 MMOL/L (ref 5–18)
APTT PPP: 24 SECONDS (ref 24–37)
AST SERPL W P-5'-P-CCNC: 44 U/L (ref 0–40)
BILIRUB SERPL-MCNC: 0.6 MG/DL (ref 0–1)
BUN SERPL-MCNC: 10 MG/DL (ref 8–22)
CALCIUM SERPL-MCNC: 9.9 MG/DL (ref 8.5–10.5)
CHLORIDE BLD-SCNC: 102 MMOL/L (ref 98–107)
CHOLEST SERPL-MCNC: 228 MG/DL
CO2 SERPL-SCNC: 27 MMOL/L (ref 22–31)
CREAT SERPL-MCNC: 0.72 MG/DL (ref 0.6–1.1)
ERYTHROCYTE [DISTWIDTH] IN BLOOD BY AUTOMATED COUNT: 10.8 % (ref 11–14.5)
FASTING STATUS PATIENT QL REPORTED: YES
GFR SERPL CREATININE-BSD FRML MDRD: >60 ML/MIN/1.73M2
GLUCOSE BLD-MCNC: 109 MG/DL (ref 70–125)
HCT VFR BLD AUTO: 48.4 % (ref 35–47)
HDLC SERPL-MCNC: 74 MG/DL
HGB BLD-MCNC: 16.6 G/DL (ref 12–16)
INR PPP: 0.94 (ref 0.9–1.1)
LDLC SERPL CALC-MCNC: 123 MG/DL
MCH RBC QN AUTO: 35.4 PG (ref 27–34)
MCHC RBC AUTO-ENTMCNC: 34.2 G/DL (ref 32–36)
MCV RBC AUTO: 104 FL (ref 80–100)
PLATELET # BLD AUTO: 202 THOU/UL (ref 140–440)
PMV BLD AUTO: 6.1 FL (ref 7–10)
POTASSIUM BLD-SCNC: 4.2 MMOL/L (ref 3.5–5)
PROT SERPL-MCNC: 6.8 G/DL (ref 6–8)
RBC # BLD AUTO: 4.67 MILL/UL (ref 3.8–5.4)
SODIUM SERPL-SCNC: 141 MMOL/L (ref 136–145)
TRIGL SERPL-MCNC: 156 MG/DL
WBC: 5.3 THOU/UL (ref 4–11)

## 2019-05-28 ASSESSMENT — MIFFLIN-ST. JEOR: SCORE: 1731.1

## 2019-05-29 LAB
ATRIAL RATE - MUSE: 88 BPM
DIASTOLIC BLOOD PRESSURE - MUSE: NORMAL MMHG
INTERPRETATION ECG - MUSE: NORMAL
P AXIS - MUSE: 66 DEGREES
PR INTERVAL - MUSE: 160 MS
QRS DURATION - MUSE: 106 MS
QT - MUSE: 378 MS
QTC - MUSE: 457 MS
R AXIS - MUSE: 71 DEGREES
SYSTOLIC BLOOD PRESSURE - MUSE: NORMAL MMHG
T AXIS - MUSE: 36 DEGREES
VENTRICULAR RATE- MUSE: 88 BPM

## 2019-05-31 ENCOUNTER — COMMUNICATION - HEALTHEAST (OUTPATIENT)
Dept: INTERNAL MEDICINE | Facility: CLINIC | Age: 64
End: 2019-05-31

## 2019-06-03 ENCOUNTER — COMMUNICATION - HEALTHEAST (OUTPATIENT)
Dept: INTERNAL MEDICINE | Facility: CLINIC | Age: 64
End: 2019-06-03

## 2019-06-06 ENCOUNTER — HOSPITAL ENCOUNTER (OUTPATIENT)
Dept: PALLIATIVE MEDICINE | Facility: OTHER | Age: 64
Discharge: HOME | End: 2019-06-06
Attending: NURSE PRACTITIONER

## 2019-06-06 DIAGNOSIS — M54.2 NECK PAIN: ICD-10-CM

## 2019-06-06 DIAGNOSIS — G89.4 CHRONIC PAIN SYNDROME: ICD-10-CM

## 2019-06-06 ASSESSMENT — MIFFLIN-ST. JEOR: SCORE: 1731.1

## 2019-06-07 ENCOUNTER — HOSPITAL ENCOUNTER (OUTPATIENT)
Dept: PALLIATIVE MEDICINE | Facility: OTHER | Age: 64
Discharge: HOME | End: 2019-06-07
Attending: SOCIAL WORKER

## 2019-06-07 ENCOUNTER — COMMUNICATION - HEALTHEAST (OUTPATIENT)
Dept: INTERNAL MEDICINE | Facility: CLINIC | Age: 64
End: 2019-06-07

## 2019-06-07 DIAGNOSIS — K21.00 GASTROESOPHAGEAL REFLUX DISEASE WITH ESOPHAGITIS: ICD-10-CM

## 2019-06-07 DIAGNOSIS — F43.12 CHRONIC POST-TRAUMATIC STRESS DISORDER (PTSD): ICD-10-CM

## 2019-06-07 DIAGNOSIS — F41.1 GENERALIZED ANXIETY DISORDER: ICD-10-CM

## 2019-06-07 DIAGNOSIS — G89.4 CHRONIC PAIN SYNDROME: ICD-10-CM

## 2019-06-07 DIAGNOSIS — F31.32 BIPOLAR AFFECTIVE DISORDER, CURRENTLY DEPRESSED, MODERATE (H): ICD-10-CM

## 2019-06-07 DIAGNOSIS — F33.2 SEVERE RECURRENT MAJOR DEPRESSION WITHOUT PSYCHOTIC FEATURES (H): ICD-10-CM

## 2019-06-07 DIAGNOSIS — E89.0 POSTABLATIVE HYPOTHYROIDISM: ICD-10-CM

## 2019-06-10 ENCOUNTER — COMMUNICATION - HEALTHEAST (OUTPATIENT)
Dept: INTERNAL MEDICINE | Facility: CLINIC | Age: 64
End: 2019-06-10

## 2019-06-10 DIAGNOSIS — E89.0 POSTABLATIVE HYPOTHYROIDISM: ICD-10-CM

## 2019-06-10 DIAGNOSIS — K21.00 GASTROESOPHAGEAL REFLUX DISEASE WITH ESOPHAGITIS: ICD-10-CM

## 2019-06-10 DIAGNOSIS — F41.9 ANXIETY: ICD-10-CM

## 2019-06-13 ENCOUNTER — AMBULATORY - HEALTHEAST (OUTPATIENT)
Dept: PALLIATIVE MEDICINE | Facility: OTHER | Age: 64
End: 2019-06-13

## 2019-06-19 ENCOUNTER — OFFICE VISIT - HEALTHEAST (OUTPATIENT)
Dept: INTERNAL MEDICINE | Facility: CLINIC | Age: 64
End: 2019-06-19

## 2019-06-19 DIAGNOSIS — J44.1 CHRONIC OBSTRUCTIVE PULMONARY DISEASE WITH ACUTE EXACERBATION (H): ICD-10-CM

## 2019-06-19 ASSESSMENT — MIFFLIN-ST. JEOR: SCORE: 1744.26

## 2019-06-23 ENCOUNTER — AMBULATORY - HEALTHEAST (OUTPATIENT)
Dept: BEHAVIORAL HEALTH | Facility: CLINIC | Age: 64
End: 2019-06-23

## 2019-06-24 ENCOUNTER — COMMUNICATION - HEALTHEAST (OUTPATIENT)
Dept: INTERNAL MEDICINE | Facility: CLINIC | Age: 64
End: 2019-06-24

## 2019-06-24 ENCOUNTER — OFFICE VISIT - HEALTHEAST (OUTPATIENT)
Dept: FAMILY MEDICINE | Facility: CLINIC | Age: 64
End: 2019-06-24

## 2019-06-24 ENCOUNTER — COMMUNICATION - HEALTHEAST (OUTPATIENT)
Dept: PALLIATIVE MEDICINE | Facility: OTHER | Age: 64
End: 2019-06-24

## 2019-06-24 DIAGNOSIS — R07.89 CHEST DISCOMFORT: ICD-10-CM

## 2019-06-24 DIAGNOSIS — R06.09 DOE (DYSPNEA ON EXERTION): ICD-10-CM

## 2019-06-24 DIAGNOSIS — R60.9 EDEMA, UNSPECIFIED TYPE: ICD-10-CM

## 2019-06-25 ENCOUNTER — COMMUNICATION - HEALTHEAST (OUTPATIENT)
Dept: INTERNAL MEDICINE | Facility: CLINIC | Age: 64
End: 2019-06-25

## 2019-06-26 ENCOUNTER — COMMUNICATION - HEALTHEAST (OUTPATIENT)
Dept: PALLIATIVE MEDICINE | Facility: OTHER | Age: 64
End: 2019-06-26

## 2019-06-26 DIAGNOSIS — G89.4 CHRONIC PAIN SYNDROME: ICD-10-CM

## 2019-06-26 DIAGNOSIS — M54.2 NECK PAIN: ICD-10-CM

## 2019-06-27 ENCOUNTER — MEDICAL CORRESPONDENCE (OUTPATIENT)
Dept: HEALTH INFORMATION MANAGEMENT | Facility: CLINIC | Age: 64
End: 2019-06-27

## 2019-06-27 ENCOUNTER — TRANSFERRED RECORDS (OUTPATIENT)
Dept: HEALTH INFORMATION MANAGEMENT | Facility: CLINIC | Age: 64
End: 2019-06-27

## 2019-06-27 ENCOUNTER — OFFICE VISIT - HEALTHEAST (OUTPATIENT)
Dept: INTERNAL MEDICINE | Facility: CLINIC | Age: 64
End: 2019-06-27

## 2019-06-27 DIAGNOSIS — J44.1 CHRONIC OBSTRUCTIVE PULMONARY DISEASE WITH ACUTE EXACERBATION (H): ICD-10-CM

## 2019-06-27 DIAGNOSIS — I27.20 PULMONARY HYPERTENSION (H): ICD-10-CM

## 2019-06-27 DIAGNOSIS — R06.02 SHORTNESS OF BREATH: ICD-10-CM

## 2019-06-27 DIAGNOSIS — I25.10 CORONARY ARTERY CALCIFICATION: ICD-10-CM

## 2019-06-27 DIAGNOSIS — G47.33 OSA (OBSTRUCTIVE SLEEP APNEA): ICD-10-CM

## 2019-06-27 DIAGNOSIS — R60.0 LOCALIZED EDEMA: ICD-10-CM

## 2019-07-01 ENCOUNTER — COMMUNICATION - HEALTHEAST (OUTPATIENT)
Dept: ADMINISTRATIVE | Facility: CLINIC | Age: 64
End: 2019-07-01

## 2019-07-02 ENCOUNTER — COMMUNICATION - HEALTHEAST (OUTPATIENT)
Dept: INTERNAL MEDICINE | Facility: CLINIC | Age: 64
End: 2019-07-02

## 2019-07-02 ENCOUNTER — TELEPHONE (OUTPATIENT)
Dept: CARDIOLOGY | Facility: CLINIC | Age: 64
End: 2019-07-02

## 2019-07-02 NOTE — TELEPHONE ENCOUNTER
[facility] Call Center    Phone Message    May a detailed message be left on voicemail: yes    Reason for Call: Patient is being referred to Dr. Edwards for pulmonary hypertension from [facility].  All HE records in Care Everywhere.  Please review and contact patient for an appointment    Action Taken: Message routed to:  [facility] Cardiology

## 2019-07-08 ENCOUNTER — COMMUNICATION - HEALTHEAST (OUTPATIENT)
Dept: PALLIATIVE MEDICINE | Facility: OTHER | Age: 64
End: 2019-07-08

## 2019-07-08 DIAGNOSIS — M54.2 NECK PAIN: ICD-10-CM

## 2019-07-08 DIAGNOSIS — G89.4 CHRONIC PAIN SYNDROME: ICD-10-CM

## 2019-07-08 NOTE — TELEPHONE ENCOUNTER
Contacted pt and scheduled appt.     Josephine Pratt  Clinic   Pulmonary Hypertension  [facility]  (P) [phone]

## 2019-07-23 ENCOUNTER — HOSPITAL ENCOUNTER (OUTPATIENT)
Dept: PALLIATIVE MEDICINE | Facility: OTHER | Age: 64
Discharge: HOME | End: 2019-07-23
Attending: NURSE PRACTITIONER

## 2019-07-23 ENCOUNTER — HOSPITAL ENCOUNTER (OUTPATIENT)
Dept: PALLIATIVE MEDICINE | Facility: OTHER | Age: 64
Discharge: HOME | End: 2019-07-23
Attending: SOCIAL WORKER

## 2019-07-23 DIAGNOSIS — F41.1 GENERALIZED ANXIETY DISORDER: ICD-10-CM

## 2019-07-23 DIAGNOSIS — G89.4 CHRONIC PAIN SYNDROME: ICD-10-CM

## 2019-07-23 DIAGNOSIS — M54.2 NECK PAIN: ICD-10-CM

## 2019-07-23 DIAGNOSIS — F43.12 CHRONIC POST-TRAUMATIC STRESS DISORDER (PTSD): ICD-10-CM

## 2019-07-23 DIAGNOSIS — F31.32 BIPOLAR AFFECTIVE DISORDER, CURRENTLY DEPRESSED, MODERATE (H): ICD-10-CM

## 2019-07-23 ASSESSMENT — MIFFLIN-ST. JEOR: SCORE: 1722.03

## 2019-07-24 ENCOUNTER — COMMUNICATION - HEALTHEAST (OUTPATIENT)
Dept: INTERNAL MEDICINE | Facility: CLINIC | Age: 64
End: 2019-07-24

## 2019-07-24 DIAGNOSIS — D75.89 MACROCYTOSIS WITHOUT ANEMIA: ICD-10-CM

## 2019-07-24 DIAGNOSIS — E89.0 POSTABLATIVE HYPOTHYROIDISM: ICD-10-CM

## 2019-07-24 DIAGNOSIS — R60.0 LOCALIZED EDEMA: ICD-10-CM

## 2019-07-24 DIAGNOSIS — M19.90 OSTEOARTHRITIS, UNSPECIFIED OSTEOARTHRITIS TYPE, UNSPECIFIED SITE: ICD-10-CM

## 2019-07-24 DIAGNOSIS — K21.00 GASTROESOPHAGEAL REFLUX DISEASE WITH ESOPHAGITIS: ICD-10-CM

## 2019-07-25 LAB
6MAM UR QL: NOT DETECTED
7AMINOCLONAZEPAM UR QL: NOT DETECTED
A-OH ALPRAZ UR QL: NOT DETECTED
ALPRAZ UR QL: NOT DETECTED
AMPHET UR QL SCN: NOT DETECTED
BARBITURATES UR QL: NOT DETECTED
BUPRENORPHINE UR QL: NOT DETECTED
BZE UR QL: NOT DETECTED
CARBOXYTHC UR QL: NOT DETECTED
CARISOPRODOL UR QL: NOT DETECTED
CLONAZEPAM UR QL: NOT DETECTED
CODEINE UR QL: NOT DETECTED
CREAT UR-MCNC: 107.1 MG/DL (ref 20–400)
DIAZEPAM UR QL: NOT DETECTED
ETHYL GLUCURONIDE UR QL: NOT DETECTED
FENTANYL UR QL: NOT DETECTED
HYDROCODONE UR QL: PRESENT
HYDROMORPHONE UR QL: NOT DETECTED
LORAZEPAM UR QL: NOT DETECTED
MDA UR QL: NOT DETECTED
MDEA UR QL: NOT DETECTED
MDMA UR QL: NOT DETECTED
ME-PHENIDATE UR QL: NOT DETECTED
MEPERIDINE UR QL: NOT DETECTED
METHADONE UR QL: NOT DETECTED
METHAMPHET UR QL: NOT DETECTED
MIDAZOLAM UR QL SCN: NOT DETECTED
MORPHINE UR QL: NOT DETECTED
NORBUPRENORPHINE UR QL CFM: NOT DETECTED
NORDIAZEPAM UR QL: NOT DETECTED
NORFENTANYL UR QL: NOT DETECTED
NORHYDROCODONE UR QL CFM: PRESENT
NOROXYCODONE UR QL CFM: NOT DETECTED
NOROXYMORPHONE UR QL SCN: NOT DETECTED
OXAZEPAM UR QL: NOT DETECTED
OXYCODONE UR QL: NOT DETECTED
OXYMORPHONE UR QL: NOT DETECTED
PATHOLOGY STUDY: NORMAL
PCP UR QL: NOT DETECTED
PHENTERMINE UR QL: NOT DETECTED
PROPOXYPH UR QL: NOT DETECTED
SERVICE CMNT-IMP: NORMAL
TAPENTADOL UR QL SCN: NOT DETECTED
TAPENTADOL UR QL SCN: NOT DETECTED
TEMAZEPAM UR QL: NOT DETECTED
TRAMADOL UR QL: NOT DETECTED
ZOLPIDEM UR QL: NOT DETECTED

## 2019-07-26 ENCOUNTER — COMMUNICATION - HEALTHEAST (OUTPATIENT)
Dept: INTERNAL MEDICINE | Facility: CLINIC | Age: 64
End: 2019-07-26

## 2019-07-26 ENCOUNTER — COMMUNICATION - HEALTHEAST (OUTPATIENT)
Dept: PALLIATIVE MEDICINE | Facility: OTHER | Age: 64
End: 2019-07-26

## 2019-07-26 DIAGNOSIS — M54.2 NECK PAIN: ICD-10-CM

## 2019-07-26 DIAGNOSIS — G89.4 CHRONIC PAIN SYNDROME: ICD-10-CM

## 2019-07-30 ENCOUNTER — AMBULATORY - HEALTHEAST (OUTPATIENT)
Dept: PALLIATIVE MEDICINE | Facility: OTHER | Age: 64
End: 2019-07-30

## 2019-07-30 ENCOUNTER — COMMUNICATION - HEALTHEAST (OUTPATIENT)
Dept: INTERNAL MEDICINE | Facility: CLINIC | Age: 64
End: 2019-07-30

## 2019-07-30 DIAGNOSIS — E89.0 POSTABLATIVE HYPOTHYROIDISM: ICD-10-CM

## 2019-07-31 ENCOUNTER — COMMUNICATION - HEALTHEAST (OUTPATIENT)
Dept: INTERNAL MEDICINE | Facility: CLINIC | Age: 64
End: 2019-07-31

## 2019-08-02 ENCOUNTER — COMMUNICATION - HEALTHEAST (OUTPATIENT)
Dept: INTERNAL MEDICINE | Facility: CLINIC | Age: 64
End: 2019-08-02

## 2019-08-02 DIAGNOSIS — E89.0 POSTABLATIVE HYPOTHYROIDISM: ICD-10-CM

## 2019-08-02 DIAGNOSIS — D75.89 MACROCYTOSIS WITHOUT ANEMIA: ICD-10-CM

## 2019-08-02 DIAGNOSIS — E03.9 ACQUIRED HYPOTHYROIDISM: ICD-10-CM

## 2019-08-03 ENCOUNTER — COMMUNICATION - HEALTHEAST (OUTPATIENT)
Dept: INTERNAL MEDICINE | Facility: CLINIC | Age: 64
End: 2019-08-03

## 2019-08-04 ENCOUNTER — COMMUNICATION - HEALTHEAST (OUTPATIENT)
Dept: INTERNAL MEDICINE | Facility: CLINIC | Age: 64
End: 2019-08-04

## 2019-08-04 DIAGNOSIS — E89.0 POSTABLATIVE HYPOTHYROIDISM: ICD-10-CM

## 2019-08-08 ENCOUNTER — HOSPITAL ENCOUNTER (OUTPATIENT)
Dept: PALLIATIVE MEDICINE | Facility: OTHER | Age: 64
Discharge: HOME | End: 2019-08-08
Attending: SOCIAL WORKER

## 2019-08-08 DIAGNOSIS — F43.12 CHRONIC POST-TRAUMATIC STRESS DISORDER (PTSD): ICD-10-CM

## 2019-08-08 DIAGNOSIS — G89.4 CHRONIC PAIN SYNDROME: ICD-10-CM

## 2019-08-08 DIAGNOSIS — F31.32 BIPOLAR AFFECTIVE DISORDER, CURRENTLY DEPRESSED, MODERATE (H): ICD-10-CM

## 2019-08-08 DIAGNOSIS — F41.1 GENERALIZED ANXIETY DISORDER: ICD-10-CM

## 2019-08-09 ENCOUNTER — OFFICE VISIT - HEALTHEAST (OUTPATIENT)
Dept: INTERNAL MEDICINE | Facility: CLINIC | Age: 64
End: 2019-08-09

## 2019-08-09 DIAGNOSIS — J44.1 CHRONIC OBSTRUCTIVE PULMONARY DISEASE WITH ACUTE EXACERBATION (H): ICD-10-CM

## 2019-08-09 DIAGNOSIS — R79.89 ELEVATED LFTS: ICD-10-CM

## 2019-08-09 DIAGNOSIS — E78.5 HYPERLIPIDEMIA LDL GOAL <100: ICD-10-CM

## 2019-08-09 DIAGNOSIS — E55.9 VITAMIN D DEFICIENCY: ICD-10-CM

## 2019-08-09 DIAGNOSIS — K21.00 GASTROESOPHAGEAL REFLUX DISEASE WITH ESOPHAGITIS: ICD-10-CM

## 2019-08-09 DIAGNOSIS — E89.0 POSTABLATIVE HYPOTHYROIDISM: ICD-10-CM

## 2019-08-09 LAB
ALT SERPL W P-5'-P-CCNC: 39 U/L (ref 0–45)
AST SERPL W P-5'-P-CCNC: 34 U/L (ref 0–40)
TSH SERPL DL<=0.005 MIU/L-ACNC: 5.35 UIU/ML (ref 0.3–5)

## 2019-08-12 ENCOUNTER — COMMUNICATION - HEALTHEAST (OUTPATIENT)
Dept: INTERNAL MEDICINE | Facility: CLINIC | Age: 64
End: 2019-08-12

## 2019-08-12 ENCOUNTER — PRE VISIT (OUTPATIENT)
Dept: CARDIOLOGY | Facility: CLINIC | Age: 64
End: 2019-08-12

## 2019-08-12 DIAGNOSIS — I27.20 PULMONARY HYPERTENSION (H): Primary | ICD-10-CM

## 2019-08-12 DIAGNOSIS — E03.9 ACQUIRED HYPOTHYROIDISM: ICD-10-CM

## 2019-08-12 LAB
25(OH)D3 SERPL-MCNC: 35.8 NG/ML (ref 30–80)
25(OH)D3 SERPL-MCNC: 35.8 NG/ML (ref 30–80)

## 2019-08-12 NOTE — TELEPHONE ENCOUNTER
New Pulmonary Hypertension Patient Form   Patient Name:  Randi Neal Age:  63F [birth year]   Referring Provider:  Dr. Stockton MRN:  [id number]   Date Test Epic Media/Scan Care Everywher     RHC ?  ?   ?      Angio/Stress ?  ?   ?     Echo ?  ?   ?     6/25/19 EKG ?   ?   ?      6MWT ?   ?   ?      PFT ?   ?   ?     5/18/17 Sleep Study ?  ?   ?     6/24/19 Chest CT ?  ?   ?      V/Q Scan ?   ?   ?      Abd/Liver US ?   ?   ?      Misc:  ?   ?   ?         ?   ?   ?         ?   ?   ?      discussed a CT scan that was done in the emergency department. It was suggestive of pulmonary hypertension and also pulmonary calcifications    Chest CT 6/2019 - Mildly prominent right and left main pulmonary arteries suggestive of pulmonary artery hypertension  Pt has a past medical hx: COPD, Vit D Defic, Hypothyroidism s/p ablation, GERD, Hyperlipidemia, Mental health issues

## 2019-08-13 ENCOUNTER — OFFICE VISIT (OUTPATIENT)
Dept: CARDIOLOGY | Facility: CLINIC | Age: 64
End: 2019-08-13
Attending: INTERNAL MEDICINE
Payer: MEDICARE

## 2019-08-13 ENCOUNTER — ANCILLARY PROCEDURE (OUTPATIENT)
Dept: CARDIOLOGY | Facility: CLINIC | Age: 64
End: 2019-08-13
Payer: MEDICARE

## 2019-08-13 ENCOUNTER — RECORDS - HEALTHEAST (OUTPATIENT)
Dept: ADMINISTRATIVE | Facility: OTHER | Age: 64
End: 2019-08-13

## 2019-08-13 ENCOUNTER — COMMUNICATION - HEALTHEAST (OUTPATIENT)
Dept: PALLIATIVE MEDICINE | Facility: OTHER | Age: 64
End: 2019-08-13

## 2019-08-13 VITALS
OXYGEN SATURATION: 91 % | BODY MASS INDEX: 41.62 KG/M2 | HEART RATE: 101 BPM | HEIGHT: 66 IN | DIASTOLIC BLOOD PRESSURE: 76 MMHG | WEIGHT: 259 LBS | SYSTOLIC BLOOD PRESSURE: 118 MMHG

## 2019-08-13 DIAGNOSIS — R06.02 SOB (SHORTNESS OF BREATH): ICD-10-CM

## 2019-08-13 DIAGNOSIS — I27.20 PULMONARY HYPERTENSION (H): ICD-10-CM

## 2019-08-13 DIAGNOSIS — E78.5 DYSLIPIDEMIA: ICD-10-CM

## 2019-08-13 DIAGNOSIS — E61.1 IRON DEFICIENCY: ICD-10-CM

## 2019-08-13 DIAGNOSIS — E83.119 HEMOCHROMATOSIS, UNSPECIFIED HEMOCHROMATOSIS TYPE: Primary | ICD-10-CM

## 2019-08-13 LAB
ALBUMIN SERPL-MCNC: 4 G/DL (ref 3.4–5)
ALP SERPL-CCNC: 77 U/L (ref 40–150)
ALT SERPL W P-5'-P-CCNC: 47 U/L (ref 0–50)
ANION GAP SERPL CALCULATED.3IONS-SCNC: 6 MMOL/L (ref 3–14)
AST SERPL W P-5'-P-CCNC: 34 U/L (ref 0–45)
BILIRUB SERPL-MCNC: 0.6 MG/DL (ref 0.2–1.3)
BUN SERPL-MCNC: 15 MG/DL (ref 7–30)
CALCIUM SERPL-MCNC: 9.4 MG/DL (ref 8.5–10.1)
CHLORIDE SERPL-SCNC: 100 MMOL/L (ref 94–109)
CHOLEST SERPL-MCNC: 221 MG/DL
CO2 SERPL-SCNC: 32 MMOL/L (ref 20–32)
CREAT SERPL-MCNC: 0.83 MG/DL (ref 0.52–1.04)
ERYTHROCYTE [DISTWIDTH] IN BLOOD BY AUTOMATED COUNT: 11.8 % (ref 10–15)
GFR SERPL CREATININE-BSD FRML MDRD: 73 ML/MIN/{1.73_M2}
GLUCOSE SERPL-MCNC: 104 MG/DL (ref 70–99)
HCT VFR BLD AUTO: 50.3 % (ref 35–47)
HDLC SERPL-MCNC: 78 MG/DL
HGB BLD-MCNC: 17.6 G/DL (ref 11.7–15.7)
IRON SATN MFR SERPL: 69 % (ref 15–46)
IRON SERPL-MCNC: 237 UG/DL (ref 35–180)
LDLC SERPL CALC-MCNC: 99 MG/DL
MCH RBC QN AUTO: 35.3 PG (ref 26.5–33)
MCHC RBC AUTO-ENTMCNC: 35 G/DL (ref 31.5–36.5)
MCV RBC AUTO: 101 FL (ref 78–100)
NONHDLC SERPL-MCNC: 143 MG/DL
NT-PROBNP SERPL-MCNC: 53 PG/ML (ref 0–125)
PLATELET # BLD AUTO: 234 10E9/L (ref 150–450)
POTASSIUM SERPL-SCNC: 3.5 MMOL/L (ref 3.4–5.3)
PROT SERPL-MCNC: 7.6 G/DL (ref 6.8–8.8)
RBC # BLD AUTO: 4.99 10E12/L (ref 3.8–5.2)
SODIUM SERPL-SCNC: 139 MMOL/L (ref 133–144)
T4 FREE SERPL-MCNC: 0.87 NG/DL (ref 0.76–1.46)
TIBC SERPL-MCNC: 344 UG/DL (ref 240–430)
TRIGL SERPL-MCNC: 220 MG/DL
TSH SERPL DL<=0.005 MIU/L-ACNC: 9.15 MU/L (ref 0.4–4)
WBC # BLD AUTO: 9.1 10E9/L (ref 4–11)

## 2019-08-13 PROCEDURE — 85027 COMPLETE CBC AUTOMATED: CPT | Performed by: INTERNAL MEDICINE

## 2019-08-13 PROCEDURE — 36415 COLL VENOUS BLD VENIPUNCTURE: CPT | Performed by: INTERNAL MEDICINE

## 2019-08-13 PROCEDURE — 83880 ASSAY OF NATRIURETIC PEPTIDE: CPT | Performed by: INTERNAL MEDICINE

## 2019-08-13 PROCEDURE — 80053 COMPREHEN METABOLIC PANEL: CPT | Performed by: INTERNAL MEDICINE

## 2019-08-13 PROCEDURE — 83540 ASSAY OF IRON: CPT | Performed by: INTERNAL MEDICINE

## 2019-08-13 PROCEDURE — 84439 ASSAY OF FREE THYROXINE: CPT | Performed by: INTERNAL MEDICINE

## 2019-08-13 PROCEDURE — 99215 OFFICE O/P EST HI 40 MIN: CPT | Mod: ZP | Performed by: INTERNAL MEDICINE

## 2019-08-13 PROCEDURE — 84443 ASSAY THYROID STIM HORMONE: CPT | Performed by: INTERNAL MEDICINE

## 2019-08-13 PROCEDURE — 83550 IRON BINDING TEST: CPT | Performed by: INTERNAL MEDICINE

## 2019-08-13 PROCEDURE — 80061 LIPID PANEL: CPT | Performed by: INTERNAL MEDICINE

## 2019-08-13 PROCEDURE — G0463 HOSPITAL OUTPT CLINIC VISIT: HCPCS | Mod: ZF

## 2019-08-13 RX ORDER — ALBUTEROL SULFATE 0.63 MG/3ML
1 SOLUTION RESPIRATORY (INHALATION) EVERY 6 HOURS PRN
COMMUNITY
End: 2021-07-22

## 2019-08-13 RX ORDER — TRIAMTERENE/HYDROCHLOROTHIAZID 37.5-25 MG
1 TABLET ORAL DAILY
COMMUNITY
End: 2019-09-13

## 2019-08-13 RX ORDER — BUDESONIDE AND FORMOTEROL FUMARATE DIHYDRATE 160; 4.5 UG/1; UG/1
2 AEROSOL RESPIRATORY (INHALATION) 2 TIMES DAILY
COMMUNITY
End: 2019-12-11

## 2019-08-13 RX ADMIN — Medication 3 ML: at 15:45

## 2019-08-13 ASSESSMENT — PAIN SCALES - GENERAL: PAINLEVEL: NO PAIN (0)

## 2019-08-13 ASSESSMENT — PATIENT HEALTH QUESTIONNAIRE - PHQ9: SUM OF ALL RESPONSES TO PHQ QUESTIONS 1-9: 18

## 2019-08-13 ASSESSMENT — MIFFLIN-ST. JEOR: SCORE: 1731.57

## 2019-08-13 NOTE — PATIENT INSTRUCTIONS
Medication Changes:  No medication changes at this time. Please continue current medication regiment.    Patient Instructions:  1. Continue staying active and eat a heart healthy diet.    2. Please keep current list of medications with you at all times.    3. Remember to weigh yourself daily after voiding and before you consume any food or beverages and log the numbers.  If you have gained 2 pounds overnight or 5 pounds in a week contact us immediately for medication adjustments or further instructions.    4. **Please call us immediately if you have any syncope (fainting or passing out), chest pain, edema (swelling or weight gain), or decline in your functional status (general decline in how you are feeling).    Follow up Appointment Information:  1)follow up in 2-4 weeks with Dr. Edwards  2)Referral to Hematology. [facility] [place] [phone]   3) Sleep Study [facility] [phone]    Results:  If any of your labs require immediate follow up, we will call you.    We are located on the third floor of the [facility] on the [facility].  Our address is     [address] [place] [address] on [address] [place] [address]    Thank you for allowing us to be a part of your care here at the [facility]    If you have questions or concerns please contact us at:    Eli Hilton, RN, BSN, PHN  Josephine Pratt (Schedule,Prior Auth)  Nurse Coordinator     Clinic   Pulmonary Hypertension   Pulmonary Hypertension  [facility]  (Phone)[phone]   (Phone) [phone]        (Fax) [phone]    ** Please note that you will NOT receive a reminder call regarding your scheduled testing, reminder calls are for provider appointments only.  If you are scheduled for testing within the [facility] system you may  receive a call regarding pre-registration for billing purposes only.**     Remember to weigh yourself daily after voiding and before you consume any food or beverages and log the numbers.  If you have gained/lost 2 pounds overnight or 5 pounds in a week contact us immediately for medication adjustments or further instructions.   **Please call us immediately if you have any syncope, chest pain, edema, or decline in your functional status.    Support Group:  Pulmonary Hypertension Association  Https://www.phassociation.org/  **Look at the Events Tab** They even have Support Groups that you can call into    [facility] Support Group  Second Saturday of the Month from 1-3 PM   Location: [address] [place] [address]  Leader: Corry Reeves and Lashonda Harvey  Phone: [phone] or [phone]  Email: [email or web address]

## 2019-08-13 NOTE — PROGRESS NOTES
Francisco J Edwards M.D.  Cardiovascular Medicine    I personally saw and examined this patient, discussed care with housestaff and other consultants, reviewed current laboratories and imaging studies, and conveyed impression and diagnostic/therapeutic plan to patient.    Loida Stockton MD    [address] [place] [address]    [phone] (Fax)        Reina Morillo MD    [address] [place], [place] [id number] [phone]    Problem List  1. Possible pulmonary hypertension  2. History of breast cancer   History of mastectomy   Breast reconstruction  3. Atherosclerosis with coronary calcifications  4. Hiatal hernia  5. Hepatic steatosis  6. Diverticulosis  7. KYAW with treatment  8. Psoriasis  9. Grave's disease with ablation  10. Hypertension  11. Asthma  12. Hemochromatosis,gene +  13. Bipolar disorder ?  14. Chronic pain with narcotic dependence  15. Pheochromocytoma (right surgically removed)  16. Elevated body mass index  17. Elevate hemoglobin with macrocytosis  18  Hiatal hernia    History    64 year old white female seen for possible pulmonary hypertension as noted by CT scan enlargement of the pulmonary arteries.  She has no history of collagen vascular disease, but does have histories of ETOH abuse, obesity, sleep disordered breath, obstructive lung disease and hepatic steatosis noted. She has exertional shortness of breath without wheezes, hemoptysis, defined PE/DVT       She has been drinking ETOH for two months and up to 2 bottles per day.  She has remote history of alcoholism.  She is not suicidal.  She is depressed .      She has no clearcut history of angina, but does have risk factors including smoking, lipids, blood pressure, obesity and KYAW.  Strong family history of ASHD    Patient has family history of hemochromatosis.  Gene study equivocal.  She has had phlebotomy.        Objective  Constitutional: alert, oriented, normal gait and station,  normal mentation.  Oral: moist mucous membranes  Lymph: without pathologic adenopathy  Chest: clear to ausculation and percussion save for basilar rales  Cor: No evidence of left or right ventricular activity.  Rhythm is regular.  S1 normal, S2 split physiologically. Murmurs are not present  Abdomen: without tenderness, rebound, guarding, masses, ascites  Extremities: Edema not present, surgical scars  Neuro: no focal defects, normal mentation  Skin: without open lesions  Psych: oriented, verbal, mental status in tact    Meds    Current Outpatient Medications   Medication     albuterol (ACCUNEB) 0.63 MG/3ML neb solution     ASPIRIN PO     budesonide-formoterol (SYMBICORT) 160-4.5 MCG/ACT Inhaler     CYANOCOBALAMIN PO     Desvenlafaxine Succinate (PRISTIQ PO)     diclofenac (VOLTAREN) 1 % topical gel     FOLIC ACID PO     HYDROcodone-acetaminophen (NORCO)  MG per tablet     HYDROcodone-acetaminophen (NORCO) 7.5-325 MG per tablet     Levothyroxine Sodium (SYNTHROID PO)     Magnesium Oxide POWD     Menaquinone-7 (VITAMIN K2 PO)     Omeprazole (PRILOSEC PO)     Pramipexole Dihydrochloride (MIRAPEX PO)     tiotropium (SPIRIVA HANDIHALER) 18 MCG inhalation capsule     triamterene-HCTZ (MAXZIDE-25) 37.5-25 MG tablet     vitamin D (ERGOCALCIFEROL) 59976 UNIT capsule     albuterol (PROAIR HFA, PROVENTIL HFA, VENTOLIN HFA) 108 (90 BASE) MCG/ACT inhaler     fluticasone-salmeterol (ADVAIR DISKUS) 250-50 MCG/DOSE diskus inhaler     hydrochlorothiazide (MICROZIDE) 12.5 MG capsule     LamoTRIgine (LAMICTAL PO)     LISINOPRIL PO     LORazepam (ATIVAN PO)     OxyCODONE HCl (OXYCONTIN PO)     senna-docusate (SENOKOT-S;PERICOLACE) 8.6-50 MG per tablet     No current facility-administered medications for this visit.      Labs  COMPLEX (SEE RESULT AND INTERPRETATION)      Result SEE BELOW   Comment:  C282Y: Two copies of the C282Y mutation were identified.  H63D: Not detected.     Interpretation SEE BELOW   Comment:  This result  may be consistent with, but does not confirm a  diagnosis of or predisposition for hereditary  hemochromatosis (HH).    Gender and serum ferritin level are known to impact the  penetrance of the p.C282Y allele. Men homozygous for  p.C282Y mutations are at considerably higher risk to  exhibit symptoms than women with the same genotype.  Individuals with two copies of the p.C282Y           Imaging     Assessment/Plan     1. Echocardiogram (today if possible)  2. Labs to include: CBC, iron, CMP, BNP, lipids, TSH, ferritin, transferring  3. Sleep study referral [facility]  4. [facility] weight loss program  5. Has resumed drinking with history of ETOH abuse  She has been drinking 2 bottles of wine per day for several months.   6. The patient has coronary calcifications and she should be treated as if she had symptoms:   1. Statin   2. Blood pressure control   3. Re-assessment of sleep disordered breath   4. Weight loss   5. She should have exercise or non-exercise nuclear stress test    7. Referral to hepatology:hemochromatosis    8. I wonder if she should be in day treatment program.      Patient is not suicidal.  She is depressed and disappointed with her performance with drugs and alcohol.        CC:    [facility]  Loida Stockton M.D.

## 2019-08-13 NOTE — LETTER
8/13/2019      RE: Randi alegria  [address]  [facility] [address]       Dear Colleague,    Thank you for the opportunity to participate in the care of your patient, Randi alegria, at the [facility]. Please see a copy of my visit note below.    Francisco J Edwards M.D.  Cardiovascular Medicine    I personally saw and examined this patient, discussed care with housestaff and other consultants, reviewed current laboratories and imaging studies, and conveyed impression and diagnostic/therapeutic plan to patient.    Loida Stockton MD    [address] [place], [place] [id number] [phone] (Fax)        Reina Morillo MD    [address] [place], [place] [id number] [phone]    Problem List  1. Possible pulmonary hypertension  2. History of breast cancer   History of mastectomy   Breast reconstruction  3. Atherosclerosis with coronary calcifications  4. Hiatal hernia  5. Hepatic steatosis  6. Diverticulosis  7. KYAW with treatment  8. Psoriasis  9. Grave's disease with ablation  10. Hypertension  11. Asthma  12. Hemochromatosis,gene +  13. Bipolar disorder ?  14. Chronic pain with narcotic dependence  15. Pheochromocytoma (right surgically removed)  16. Elevated body mass index  17. Elevate hemoglobin with macrocytosis  18  Hiatal hernia    History    64 year old white female seen for possible pulmonary hypertension as noted by CT scan enlargement of the pulmonary arteries.  She has no history of collagen vascular disease, but does have histories of ETOH abuse, obesity, sleep disordered breath, obstructive lung disease and hepatic steatosis noted. She has exertional shortness of breath without wheezes, hemoptysis, defined PE/DVT       She has been drinking ETOH for two months and up to 2 bottles per day.  She has remote history of alcoholism.  She is not suicidal.  She is depressed .      She has  no clearcut history of angina, but does have risk factors including smoking, lipids, blood pressure, obesity and KYAW.  Strong family history of ASHD    Patient has family history of hemochromatosis.  Gene study equivocal.  She has had phlebotomy.        Objective  Constitutional: alert, oriented, normal gait and station, normal mentation.  Oral: moist mucous membranes  Lymph: without pathologic adenopathy  Chest: clear to ausculation and percussion save for basilar rales  Cor: No evidence of left or right ventricular activity.  Rhythm is regular.  S1 normal, S2 split physiologically. Murmurs are not present  Abdomen: without tenderness, rebound, guarding, masses, ascites  Extremities: Edema not present, surgical scars  Neuro: no focal defects, normal mentation  Skin: without open lesions  Psych: oriented, verbal, mental status in tact    Meds    Current Outpatient Medications   Medication     albuterol (ACCUNEB) 0.63 MG/3ML neb solution     ASPIRIN PO     budesonide-formoterol (SYMBICORT) 160-4.5 MCG/ACT Inhaler     CYANOCOBALAMIN PO     Desvenlafaxine Succinate (PRISTIQ PO)     diclofenac (VOLTAREN) 1 % topical gel     FOLIC ACID PO     HYDROcodone-acetaminophen (NORCO)  MG per tablet     HYDROcodone-acetaminophen (NORCO) 7.5-325 MG per tablet     Levothyroxine Sodium (SYNTHROID PO)     Magnesium Oxide POWD     Menaquinone-7 (VITAMIN K2 PO)     Omeprazole (PRILOSEC PO)     Pramipexole Dihydrochloride (MIRAPEX PO)     tiotropium (SPIRIVA HANDIHALER) 18 MCG inhalation capsule     triamterene-HCTZ (MAXZIDE-25) 37.5-25 MG tablet     vitamin D (ERGOCALCIFEROL) 14038 UNIT capsule     albuterol (PROAIR HFA, PROVENTIL HFA, VENTOLIN HFA) 108 (90 BASE) MCG/ACT inhaler     fluticasone-salmeterol (ADVAIR DISKUS) 250-50 MCG/DOSE diskus inhaler     hydrochlorothiazide (MICROZIDE) 12.5 MG capsule     LamoTRIgine (LAMICTAL PO)     LISINOPRIL PO     LORazepam (ATIVAN PO)     OxyCODONE HCl (OXYCONTIN PO)     senna-docusate  (SENOKOT-S;PERICOLACE) 8.6-50 MG per tablet     No current facility-administered medications for this visit.      Labs  COMPLEX (SEE RESULT AND INTERPRETATION)      Result SEE BELOW   Comment:  C282Y: Two copies of the C282Y mutation were identified.  H63D: Not detected.     Interpretation SEE BELOW   Comment:  This result may be consistent with, but does not confirm a  diagnosis of or predisposition for hereditary  hemochromatosis (HH).    Gender and serum ferritin level are known to impact the  penetrance of the p.C282Y allele. Men homozygous for  p.C282Y mutations are at considerably higher risk to  exhibit symptoms than women with the same genotype.  Individuals with two copies of the p.C282Y           Imaging     Assessment/Plan     1. Echocardiogram (today if possible)  2. Labs to include: CBC, iron, CMP, BNP, lipids, TSH, ferritin, transferring  3. Sleep study referral [facility]  4. [facility] weight loss program  5. Has resumed drinking with history of ETOH abuse  She has been drinking 2 bottles of wine per day for several months.   6. The patient has coronary calcifications and she should be treated as if she had symptoms:   1. Statin   2. Blood pressure control   3. Re-assessment of sleep disordered breath   4. Weight loss   5. She should have exercise or non-exercise nuclear stress test    7. Referral to hepatology:hemochromatosis    8. I wonder if she should be in day treatment program.      Patient is not suicidal.  She is depressed and disappointed with her performance with drugs and alcohol.        CC:  [facility]  Loida Stockton M.D      Please do not hesitate to contact me if you have any questions/concerns.     Sincerely,     Francisco J Edwards MD

## 2019-08-14 ENCOUNTER — HOSPITAL ENCOUNTER (OUTPATIENT)
Dept: PALLIATIVE MEDICINE | Facility: OTHER | Age: 64
Discharge: HOME | End: 2019-08-14
Attending: PAIN MEDICINE | Admitting: PAIN MEDICINE

## 2019-08-14 DIAGNOSIS — M12.88 OTHER SPECIFIC ARTHROPATHIES, NOT ELSEWHERE CLASSIFIED, OTHER SPECIFIED SITE: ICD-10-CM

## 2019-08-15 ENCOUNTER — COMMUNICATION - HEALTHEAST (OUTPATIENT)
Dept: PALLIATIVE MEDICINE | Facility: OTHER | Age: 64
End: 2019-08-15

## 2019-08-15 ENCOUNTER — HOSPITAL ENCOUNTER (OUTPATIENT)
Dept: PALLIATIVE MEDICINE | Facility: OTHER | Age: 64
Discharge: HOME | End: 2019-08-15
Attending: SOCIAL WORKER

## 2019-08-15 ENCOUNTER — HOSPITAL ENCOUNTER (OUTPATIENT)
Dept: PALLIATIVE MEDICINE | Facility: OTHER | Age: 64
Discharge: HOME | End: 2019-08-15
Attending: NURSE PRACTITIONER

## 2019-08-15 DIAGNOSIS — F41.1 GENERALIZED ANXIETY DISORDER: ICD-10-CM

## 2019-08-15 DIAGNOSIS — F31.32 BIPOLAR AFFECTIVE DISORDER, CURRENTLY DEPRESSED, MODERATE (H): ICD-10-CM

## 2019-08-15 DIAGNOSIS — F43.12 CHRONIC POST-TRAUMATIC STRESS DISORDER (PTSD): ICD-10-CM

## 2019-08-15 DIAGNOSIS — M46.1 SACROILIITIS (H): ICD-10-CM

## 2019-08-15 DIAGNOSIS — G89.4 CHRONIC PAIN SYNDROME: ICD-10-CM

## 2019-08-15 DIAGNOSIS — M54.2 NECK PAIN: ICD-10-CM

## 2019-08-15 ASSESSMENT — MIFFLIN-ST. JEOR: SCORE: 1731.1

## 2019-08-16 ENCOUNTER — COMMUNICATION - HEALTHEAST (OUTPATIENT)
Dept: PALLIATIVE MEDICINE | Facility: OTHER | Age: 64
End: 2019-08-16

## 2019-08-20 NOTE — TELEPHONE ENCOUNTER
RECORDS STATUS - ALL OTHER DIAGNOSIS      RECORDS RECEIVED FROM: [facility]   DATE RECEIVED: 8/20/19   NOTES STATUS DETAILS   OFFICE NOTE from referring provider Francisco J Fontenot MD   OFFICE NOTE from medical oncologist     DISCHARGE SUMMARY from hospital     DISCHARGE REPORT from the ER     OPERATIVE REPORT     MEDICATION LIST [facility] - 8/15/19   CLINICAL TRIAL TREATMENTS TO DATE     LABS     PATHOLOGY REPORTS     ANYTHING RELATED TO DIAGNOSIS Epic 8/13/19   GENONOMIC TESTING     TYPE:     IMAGING (NEED IMAGES & REPORT)     CT SCANS     MRI     MAMMO     ULTRASOUND     PET

## 2019-08-21 ENCOUNTER — OFFICE VISIT - HEALTHEAST (OUTPATIENT)
Dept: PULMONOLOGY | Facility: OTHER | Age: 64
End: 2019-08-21

## 2019-08-21 ENCOUNTER — RECORDS - HEALTHEAST (OUTPATIENT)
Dept: PULMONOLOGY | Facility: OTHER | Age: 64
End: 2019-08-21

## 2019-08-21 ENCOUNTER — RECORDS - HEALTHEAST (OUTPATIENT)
Dept: ADMINISTRATIVE | Facility: OTHER | Age: 64
End: 2019-08-21

## 2019-08-21 DIAGNOSIS — R06.02 SHORTNESS OF BREATH: ICD-10-CM

## 2019-08-21 DIAGNOSIS — J44.1 CHRONIC OBSTRUCTIVE PULMONARY DISEASE WITH ACUTE EXACERBATION (H): ICD-10-CM

## 2019-08-21 DIAGNOSIS — J44.1 CHRONIC OBSTRUCTIVE PULMONARY DISEASE WITH (ACUTE) EXACERBATION (H): ICD-10-CM

## 2019-08-21 DIAGNOSIS — I27.20 PULMONARY HYPERTENSION, UNSPECIFIED (H): ICD-10-CM

## 2019-08-21 LAB — HGB BLD-MCNC: 15.2 G/DL

## 2019-08-21 ASSESSMENT — MIFFLIN-ST. JEOR: SCORE: 1744.71

## 2019-08-21 NOTE — RESULT ENCOUNTER NOTE
Eli,  Per Dr. Edwards, please call and send results to her PCP regarding the elevated TSH.  Please also call the pt to make her aware. Gladis Stanley RN on 8/21/2019 at 3:58 PM    Elevated Iron and H&H is due to her hemachromatosis

## 2019-08-26 ENCOUNTER — COMMUNICATION - HEALTHEAST (OUTPATIENT)
Dept: PALLIATIVE MEDICINE | Facility: OTHER | Age: 64
End: 2019-08-26

## 2019-08-26 DIAGNOSIS — M19.90 OSTEOARTHRITIS, UNSPECIFIED OSTEOARTHRITIS TYPE, UNSPECIFIED SITE: ICD-10-CM

## 2019-08-26 DIAGNOSIS — M54.2 NECK PAIN: ICD-10-CM

## 2019-08-26 DIAGNOSIS — G89.4 CHRONIC PAIN SYNDROME: ICD-10-CM

## 2019-09-04 ENCOUNTER — ONCOLOGY VISIT (OUTPATIENT)
Dept: ONCOLOGY | Facility: CLINIC | Age: 64
End: 2019-09-04
Attending: INTERNAL MEDICINE
Payer: MEDICARE

## 2019-09-04 VITALS
TEMPERATURE: 98 F | HEART RATE: 94 BPM | WEIGHT: 262.2 LBS | RESPIRATION RATE: 16 BRPM | SYSTOLIC BLOOD PRESSURE: 125 MMHG | BODY MASS INDEX: 42.14 KG/M2 | DIASTOLIC BLOOD PRESSURE: 82 MMHG | HEIGHT: 66 IN | OXYGEN SATURATION: 93 %

## 2019-09-04 DIAGNOSIS — E83.119 HEMOCHROMATOSIS, UNSPECIFIED HEMOCHROMATOSIS TYPE: ICD-10-CM

## 2019-09-04 LAB
BASOPHILS # BLD AUTO: 0.1 10E9/L (ref 0–0.2)
BASOPHILS NFR BLD AUTO: 0.7 %
DIFFERENTIAL METHOD BLD: ABNORMAL
EOSINOPHIL # BLD AUTO: 0.1 10E9/L (ref 0–0.7)
EOSINOPHIL NFR BLD AUTO: 1.2 %
ERYTHROCYTE [DISTWIDTH] IN BLOOD BY AUTOMATED COUNT: 11.8 % (ref 10–15)
FERRITIN SERPL-MCNC: 60 NG/ML (ref 8–252)
HCT VFR BLD AUTO: 45.8 % (ref 35–47)
HGB BLD-MCNC: 16.2 G/DL (ref 11.7–15.7)
IMM GRANULOCYTES # BLD: 0.1 10E9/L (ref 0–0.4)
IMM GRANULOCYTES NFR BLD: 0.9 %
LYMPHOCYTES # BLD AUTO: 1.5 10E9/L (ref 0.8–5.3)
LYMPHOCYTES NFR BLD AUTO: 15.5 %
MCH RBC QN AUTO: 34.4 PG (ref 26.5–33)
MCHC RBC AUTO-ENTMCNC: 35.4 G/DL (ref 31.5–36.5)
MCV RBC AUTO: 97 FL (ref 78–100)
MONOCYTES # BLD AUTO: 0.6 10E9/L (ref 0–1.3)
MONOCYTES NFR BLD AUTO: 6 %
NEUTROPHILS # BLD AUTO: 7.3 10E9/L (ref 1.6–8.3)
NEUTROPHILS NFR BLD AUTO: 75.7 %
NRBC # BLD AUTO: 0 10*3/UL
NRBC BLD AUTO-RTO: 0 /100
PLATELET # BLD AUTO: 261 10E9/L (ref 150–450)
RBC # BLD AUTO: 4.71 10E12/L (ref 3.8–5.2)
WBC # BLD AUTO: 9.7 10E9/L (ref 4–11)

## 2019-09-04 PROCEDURE — 85025 COMPLETE CBC W/AUTO DIFF WBC: CPT | Performed by: INTERNAL MEDICINE

## 2019-09-04 PROCEDURE — G0463 HOSPITAL OUTPT CLINIC VISIT: HCPCS | Mod: ZF

## 2019-09-04 PROCEDURE — 36415 COLL VENOUS BLD VENIPUNCTURE: CPT

## 2019-09-04 PROCEDURE — 99212 OFFICE O/P EST SF 10 MIN: CPT | Mod: ZP | Performed by: INTERNAL MEDICINE

## 2019-09-04 PROCEDURE — 82728 ASSAY OF FERRITIN: CPT | Performed by: INTERNAL MEDICINE

## 2019-09-04 ASSESSMENT — MIFFLIN-ST. JEOR: SCORE: 1746.08

## 2019-09-04 ASSESSMENT — PAIN SCALES - GENERAL: PAINLEVEL: MODERATE PAIN (5)

## 2019-09-04 NOTE — PROGRESS NOTES
In 1990 she had Breast can and she got chemo and radiation after lumpectomy and LN dissection - She states that she got AC and 5Fu.   She underwent phlebotomy - ?? May be 3 phlebotomy.   ROS - Fatigued.

## 2019-09-04 NOTE — NURSING NOTE
"Oncology Rooming Note    September 4, 2019 11:23 AM   Randi Morgan airam is a 64 year old female who presents for:    Chief Complaint   Patient presents with     Oncology Clinic Visit     NEW; BARRETTE hemochrommatosis unspecified      Initial Vitals: /82   Pulse 94   Temp 98  F (36.7  C) (Oral)   Resp 16   Ht 1.676 m (5' 6\")   Wt 118.9 kg (262 lb 3.2 oz)   SpO2 93%   BMI 42.32 kg/m   Estimated body mass index is 42.32 kg/m  as calculated from the following:    Height as of this encounter: 1.676 m (5' 6\").    Weight as of this encounter: 118.9 kg (262 lb 3.2 oz). Body surface area is 2.35 meters squared.  Moderate Pain (5) Comment: shoulder pain.   No LMP recorded. Patient is postmenopausal.  Allergies reviewed: Yes  Medications reviewed: Yes    Medications: Medication refills not needed today.  Pharmacy name entered into EPIC: Data Unavailable    Clinical concerns: No new concerns.       Alanna Boyd CMA              "

## 2019-09-04 NOTE — NURSING NOTE
Chief Complaint   Patient presents with     Blood Draw     Labs drawn via VPT by RN in lab.     Labs collected from venipuncture by RN.     Gilda SANDERS RN PHN BSN  BMT/Oncology Lab

## 2019-09-04 NOTE — LETTER
9/4/2019       RE: Randi alegria  [address]  [facility] [address]     Dear Colleague,    Thank you for referring your patient, Randi alegria, to the [facility]. Please see a copy of my visit note below.    In 1990 she had Breast can and she got chemo and radiation after lumpectomy and LN dissection - She states that she got AC and 5Fu.   She underwent phlebotomy - ?? May be 3 phlebotomy.   ROS - Fatigued.     Sara Hill MD

## 2019-09-05 ENCOUNTER — HOSPITAL ENCOUNTER (OUTPATIENT)
Dept: PALLIATIVE MEDICINE | Facility: OTHER | Age: 64
Discharge: HOME | End: 2019-09-05
Attending: SOCIAL WORKER

## 2019-09-05 DIAGNOSIS — F43.12 CHRONIC POST-TRAUMATIC STRESS DISORDER (PTSD): ICD-10-CM

## 2019-09-05 DIAGNOSIS — F31.32 BIPOLAR AFFECTIVE DISORDER, CURRENTLY DEPRESSED, MODERATE (H): ICD-10-CM

## 2019-09-05 DIAGNOSIS — F41.1 GENERALIZED ANXIETY DISORDER: ICD-10-CM

## 2019-09-05 DIAGNOSIS — G89.4 CHRONIC PAIN SYNDROME: ICD-10-CM

## 2019-09-06 ENCOUNTER — TELEPHONE (OUTPATIENT)
Dept: ONCOLOGY | Facility: CLINIC | Age: 64
End: 2019-09-06

## 2019-09-06 NOTE — TELEPHONE ENCOUNTER
Pt called in asking for blood lab results. Given this is a new Pt to this clinic and some results were abnormal I transferred the Pt to [facility]  after telling the Pt I would route her request to her care team.

## 2019-09-09 ENCOUNTER — TELEPHONE (OUTPATIENT)
Dept: CARDIOLOGY | Facility: CLINIC | Age: 64
End: 2019-09-09

## 2019-09-09 NOTE — TELEPHONE ENCOUNTER
Patient had concerns with a sleep study appointment she made. Initially patient scheduled an appointment in [facility] but wanted to cancel and reschedule with a location closer to the [facility] building. The patient had already scheduled with a sleep study location closer to [place] and just wanted to confirm with an RN that this was an okay move. I verified that changing locations based on personal preference was in fact ok. Pt also inquired about seeing an endocrinologist and getting a referral placed - she wants to know what Dr. Edwards's preference is. I did not see an order placed for an endo referral. I told her I would talk to Dr. Bain tomorrow and call her back with a referral name. Patient verbalized understanding. Bindu Walden RN on 9/9/2019 at 1:42 PM

## 2019-09-10 DIAGNOSIS — E11.9 DIABETES MELLITUS (H): ICD-10-CM

## 2019-09-10 DIAGNOSIS — I27.20 PULMONARY HYPERTENSION (H): Primary | ICD-10-CM

## 2019-09-10 DIAGNOSIS — R06.02 SOB (SHORTNESS OF BREATH): ICD-10-CM

## 2019-09-10 PROBLEM — E83.119 HEMOCHROMATOSIS, UNSPECIFIED HEMOCHROMATOSIS TYPE: Status: ACTIVE | Noted: 2019-09-10

## 2019-09-10 NOTE — PROGRESS NOTES
Pt called and left message regarding an order for a referral. I placed an order for the endocrinology referral via Dr. Bain for Alee Ckoer MD and updated the patient. Patient verbalized understanding. Bindu Walden RN on 9/10/2019 at 2:52 PM

## 2019-09-12 ENCOUNTER — HOSPITAL ENCOUNTER (OUTPATIENT)
Dept: PALLIATIVE MEDICINE | Facility: OTHER | Age: 64
Discharge: HOME | End: 2019-09-12
Attending: NURSE PRACTITIONER

## 2019-09-12 ENCOUNTER — HOSPITAL ENCOUNTER (OUTPATIENT)
Dept: PALLIATIVE MEDICINE | Facility: OTHER | Age: 64
Discharge: HOME | End: 2019-09-12
Attending: SOCIAL WORKER

## 2019-09-12 DIAGNOSIS — F31.32 BIPOLAR AFFECTIVE DISORDER, CURRENTLY DEPRESSED, MODERATE (H): ICD-10-CM

## 2019-09-12 DIAGNOSIS — M54.2 NECK PAIN: ICD-10-CM

## 2019-09-12 DIAGNOSIS — G89.4 CHRONIC PAIN SYNDROME: ICD-10-CM

## 2019-09-12 DIAGNOSIS — F43.12 CHRONIC POST-TRAUMATIC STRESS DISORDER (PTSD): ICD-10-CM

## 2019-09-12 DIAGNOSIS — R10.12 ABDOMINAL PAIN, LEFT UPPER QUADRANT: ICD-10-CM

## 2019-09-12 DIAGNOSIS — F41.1 ANXIETY STATE: ICD-10-CM

## 2019-09-12 DIAGNOSIS — F41.1 GENERALIZED ANXIETY DISORDER: ICD-10-CM

## 2019-09-12 ASSESSMENT — MIFFLIN-ST. JEOR: SCORE: 1744.71

## 2019-09-13 ENCOUNTER — INFUSION THERAPY VISIT (OUTPATIENT)
Dept: ONCOLOGY | Facility: CLINIC | Age: 64
End: 2019-09-13
Attending: INTERNAL MEDICINE
Payer: MEDICARE

## 2019-09-13 ENCOUNTER — APPOINTMENT (OUTPATIENT)
Dept: LAB | Facility: CLINIC | Age: 64
End: 2019-09-13
Attending: INTERNAL MEDICINE
Payer: MEDICARE

## 2019-09-13 VITALS
DIASTOLIC BLOOD PRESSURE: 85 MMHG | HEART RATE: 104 BPM | OXYGEN SATURATION: 93 % | BODY MASS INDEX: 42.95 KG/M2 | WEIGHT: 266.1 LBS | SYSTOLIC BLOOD PRESSURE: 148 MMHG | RESPIRATION RATE: 20 BRPM | TEMPERATURE: 97.6 F

## 2019-09-13 DIAGNOSIS — E83.119 HEMOCHROMATOSIS, UNSPECIFIED HEMOCHROMATOSIS TYPE: Primary | ICD-10-CM

## 2019-09-13 LAB
BASOPHILS # BLD AUTO: 0.1 10E9/L (ref 0–0.2)
BASOPHILS NFR BLD AUTO: 0.7 %
DIFFERENTIAL METHOD BLD: ABNORMAL
EOSINOPHIL # BLD AUTO: 0.1 10E9/L (ref 0–0.7)
EOSINOPHIL NFR BLD AUTO: 1 %
ERYTHROCYTE [DISTWIDTH] IN BLOOD BY AUTOMATED COUNT: 12 % (ref 10–15)
FERRITIN SERPL-MCNC: 42 NG/ML (ref 8–252)
HCT VFR BLD AUTO: 42.1 % (ref 35–47)
HGB BLD-MCNC: 15.3 G/DL (ref 11.7–15.7)
IMM GRANULOCYTES # BLD: 0.1 10E9/L (ref 0–0.4)
IMM GRANULOCYTES NFR BLD: 1 %
LYMPHOCYTES # BLD AUTO: 1.1 10E9/L (ref 0.8–5.3)
LYMPHOCYTES NFR BLD AUTO: 15 %
MCH RBC QN AUTO: 35.7 PG (ref 26.5–33)
MCHC RBC AUTO-ENTMCNC: 36.3 G/DL (ref 31.5–36.5)
MCV RBC AUTO: 98 FL (ref 78–100)
MONOCYTES # BLD AUTO: 0.5 10E9/L (ref 0–1.3)
MONOCYTES NFR BLD AUTO: 6.2 %
NEUTROPHILS # BLD AUTO: 5.5 10E9/L (ref 1.6–8.3)
NEUTROPHILS NFR BLD AUTO: 76.1 %
NRBC # BLD AUTO: 0 10*3/UL
NRBC BLD AUTO-RTO: 0 /100
PLATELET # BLD AUTO: 222 10E9/L (ref 150–450)
RBC # BLD AUTO: 4.29 10E12/L (ref 3.8–5.2)
WBC # BLD AUTO: 7.3 10E9/L (ref 4–11)

## 2019-09-13 PROCEDURE — 85025 COMPLETE CBC W/AUTO DIFF WBC: CPT | Performed by: INTERNAL MEDICINE

## 2019-09-13 PROCEDURE — 99195 PHLEBOTOMY: CPT

## 2019-09-13 PROCEDURE — 40000556 ZZH STATISTIC PERIPHERAL IV START W US GUIDANCE: Mod: ZF

## 2019-09-13 PROCEDURE — 82728 ASSAY OF FERRITIN: CPT | Performed by: INTERNAL MEDICINE

## 2019-09-13 ASSESSMENT — PAIN SCALES - GENERAL: PAINLEVEL: SEVERE PAIN (6)

## 2019-09-13 NOTE — NURSING NOTE
Chief Complaint   Patient presents with     Blood Draw     labs drawn with IV start by rn.  vital signs taken.     Labs drawn with IV start by RN in lab.  Vital signs taken.  Pt checked in to next appointment.  Sophy Chaparro RN

## 2019-09-13 NOTE — PROGRESS NOTES
Infusion Nursing Note:  Randi alegria presents today for phlebotomy.    Patient seen by provider today: No   present during visit today: Not Applicable.    Note: Patient is here for phlebotomy today. She reports she had phlebotomies done in 1990. IV flushed easily and had brisk blood return but drained slowly when attached to phlebotomy tubing. 250 mL removed via syringe per therapy plan orders. Pt tolerated the phlebotomy well.     Intravenous Access:  Peripheral IV placed by lab.    Treatment Conditions:  Lab Results   Component Value Date    HGB 15.3 09/13/2019     Lab Results   Component Value Date    WBC 7.3 09/13/2019      Lab Results   Component Value Date    ANEU 5.5 09/13/2019     Lab Results   Component Value Date     09/13/2019      Ferritin 42  Results reviewed, labs MET treatment parameters, ok to proceed with treatment.      Post Infusion Assessment:  Access discontinued per protocol.       Discharge Plan:   Patient declined prescription refills.  Discharge instructions reviewed with: Patient and Family.  Patient and/or family verbalized understanding of discharge instructions and all questions answered.  AVS to patient via [employer].  Patient will return 9/19/2019 for next infusion appointment.   Patient discharged in stable condition accompanied by: sister-in-law.  Departure Mode: Ambulatory.     Stephanie Reynolds RN

## 2019-09-16 ENCOUNTER — TELEPHONE (OUTPATIENT)
Dept: ENDOCRINOLOGY | Facility: CLINIC | Age: 64
End: 2019-09-16

## 2019-09-16 NOTE — TELEPHONE ENCOUNTER
Endocrine triage:  Chart review shows she already had contact with her PCP regarding the recent FTs.    To schedulers: Please schedule  for lst available endocrine    Alee Coker MD  Endocrine triage

## 2019-09-16 NOTE — TELEPHONE ENCOUNTER
Health Call Center    Phone Message    May a detailed message be left on voicemail: yes    Reason for Call: Other: Endocrine referral    Pt has an internal referral from Dr. Edwards but it says diabetes mellitus.  When patient called in she stated she does not have diabetes and this was for adrenal and thyroid issues and history of Grave's disease.    Action Taken: Message routed to:  [facility] Endocrine

## 2019-09-19 ENCOUNTER — APPOINTMENT (OUTPATIENT)
Dept: LAB | Facility: CLINIC | Age: 64
End: 2019-09-19
Attending: INTERNAL MEDICINE
Payer: MEDICARE

## 2019-09-19 ENCOUNTER — INFUSION THERAPY VISIT (OUTPATIENT)
Dept: ONCOLOGY | Facility: CLINIC | Age: 64
End: 2019-09-19
Attending: INTERNAL MEDICINE
Payer: MEDICARE

## 2019-09-19 ENCOUNTER — COMMUNICATION - HEALTHEAST (OUTPATIENT)
Dept: PALLIATIVE MEDICINE | Facility: OTHER | Age: 64
End: 2019-09-19

## 2019-09-19 VITALS
WEIGHT: 263.2 LBS | TEMPERATURE: 98.7 F | OXYGEN SATURATION: 92 % | SYSTOLIC BLOOD PRESSURE: 137 MMHG | DIASTOLIC BLOOD PRESSURE: 84 MMHG | RESPIRATION RATE: 20 BRPM | BODY MASS INDEX: 42.48 KG/M2 | HEART RATE: 92 BPM

## 2019-09-19 DIAGNOSIS — G89.4 CHRONIC PAIN SYNDROME: ICD-10-CM

## 2019-09-19 DIAGNOSIS — E83.119 HEMOCHROMATOSIS, UNSPECIFIED HEMOCHROMATOSIS TYPE: Primary | ICD-10-CM

## 2019-09-19 DIAGNOSIS — M54.2 NECK PAIN: ICD-10-CM

## 2019-09-19 LAB
BASOPHILS # BLD AUTO: 0 10E9/L (ref 0–0.2)
BASOPHILS NFR BLD AUTO: 0.5 %
DIFFERENTIAL METHOD BLD: ABNORMAL
EOSINOPHIL # BLD AUTO: 0.1 10E9/L (ref 0–0.7)
EOSINOPHIL NFR BLD AUTO: 1.2 %
ERYTHROCYTE [DISTWIDTH] IN BLOOD BY AUTOMATED COUNT: 12.2 % (ref 10–15)
FERRITIN SERPL-MCNC: 36 NG/ML (ref 8–252)
HCT VFR BLD AUTO: 45.6 % (ref 35–47)
HGB BLD-MCNC: 16.2 G/DL (ref 11.7–15.7)
IMM GRANULOCYTES # BLD: 0.1 10E9/L (ref 0–0.4)
IMM GRANULOCYTES NFR BLD: 0.9 %
LYMPHOCYTES # BLD AUTO: 1.1 10E9/L (ref 0.8–5.3)
LYMPHOCYTES NFR BLD AUTO: 14.5 %
MCH RBC QN AUTO: 34.8 PG (ref 26.5–33)
MCHC RBC AUTO-ENTMCNC: 35.5 G/DL (ref 31.5–36.5)
MCV RBC AUTO: 98 FL (ref 78–100)
MONOCYTES # BLD AUTO: 0.4 10E9/L (ref 0–1.3)
MONOCYTES NFR BLD AUTO: 5.6 %
NEUTROPHILS # BLD AUTO: 5.8 10E9/L (ref 1.6–8.3)
NEUTROPHILS NFR BLD AUTO: 77.3 %
NRBC # BLD AUTO: 0 10*3/UL
NRBC BLD AUTO-RTO: 0 /100
PLATELET # BLD AUTO: 339 10E9/L (ref 150–450)
RBC # BLD AUTO: 4.65 10E12/L (ref 3.8–5.2)
WBC # BLD AUTO: 7.5 10E9/L (ref 4–11)

## 2019-09-19 PROCEDURE — 99195 PHLEBOTOMY: CPT | Mod: ZF

## 2019-09-19 PROCEDURE — 99207 ZZC NO BILLABLE SERVICE THIS VISIT: CPT | Mod: ZP

## 2019-09-19 PROCEDURE — 40000556 ZZH STATISTIC PERIPHERAL IV START W US GUIDANCE: Mod: ZF

## 2019-09-19 PROCEDURE — 82728 ASSAY OF FERRITIN: CPT | Performed by: INTERNAL MEDICINE

## 2019-09-19 PROCEDURE — 85025 COMPLETE CBC W/AUTO DIFF WBC: CPT | Performed by: INTERNAL MEDICINE

## 2019-09-19 ASSESSMENT — PAIN SCALES - GENERAL: PAINLEVEL: NO PAIN (0)

## 2019-09-19 NOTE — PROGRESS NOTES
Infusion Nursing Note:  Randi alegria presents today for Phlebotomy.    Patient seen by provider today: No   present during visit today: Not Applicable.    Note: Patient presents to infusion feeling okay today. She gets short of breath with exertion, and has baseline coughing due to her COPD. She has some psoriasis on her hands that continues to heal. Patient reports ongoing fatigue, but also being overwhelmed with a lot of family matters at this time. Feels as if her fatigue is actually better since her phlebotomy last week. No new issues or concerns today. She ate some cookies and drank juice prior to phlebotomy.    Patient felt slightly dizzy after phlebotomizing 250 ml. RN took vitals, which were stable. Patient sat for a couple minutes, and then felt okay to get up and go to the bathroom. Reported feeling okay to leave. IV discontinued and patient discharged.     Intravenous Access:  Peripheral IV placed.    Treatment Conditions:  Lab Results   Component Value Date    HGB 16.2 09/19/2019     Lab Results   Component Value Date    WBC 7.5 09/19/2019      Lab Results   Component Value Date    ANEU 5.8 09/19/2019     Lab Results   Component Value Date     09/19/2019      Results reviewed, labs MET treatment parameters, ok to proceed with treatment.  Ferritin level of 36.      Post Infusion Assessment:  Patient tolerated Phlebotomy without incident.  Blood return noted pre and post infusion.  Site patent and intact, free from redness, edema or discomfort.  No evidence of extravasations.  Access discontinued per protocol.       Discharge Plan:   Patient declined prescription refills.  Discharge instructions reviewed with: Patient.  Patient and/or family verbalized understanding of discharge instructions and all questions answered.  Copy of AVS reviewed with patient and/or family.  Patient will return 9/25/19 for next appointment.  Patient discharged in stable condition accompanied by:  self.  Departure Mode: Ambulatory.    Lorena Drew, RN, RN

## 2019-09-19 NOTE — NURSING NOTE
Vitals done, labs drawn by venipuncture.  See doc flow sheets for details.  Iza Alejo, JON September 19, 2019

## 2019-09-19 NOTE — PATIENT INSTRUCTIONS
Contact Numbers  [facility]: [phone]        Please call the [facility] Triage line if you experience a temperature greater than or equal to 100.5, shaking chills, have uncontrolled nausea, vomiting and/or diarrhea, dizziness, shortness of breath, chest pain, bleeding, unexplained bruising, or if you have any other new/concerning symptoms, questions or concerns.     If it is after hours, weekends, or holidays, please call the main hospital  at  [phone] and ask to speak to the Oncology doctor on call.     If you are having any concerning symptoms or wish to speak to a provider before your next infusion visit, please call your care coordinator or triage to notify them so we can adequately serve you.     If you need a refill on a narcotic prescription or other medication, please call triage before your infusion appointment.         September 2019 Sunday Monday Tuesday Wednesday Thursday Friday Saturday   1     2     3     4    [facility] NEW  11:00 AM   (60 min.)   Sara Hill MD   [facility] DRAW   1:15 PM   (15 min.)   [facility]   [facility] 5     6     7       8     9     10     11     12     13    [facility] DRAW   2:00 PM   (15 min.)   [facility] DRAW   [facility] Lab Draw    [facility] ONC INFUSION 60   2:30 PM   (60 min.)    ONCOLOGY INFUSION   [facility] 14       15     16     17     18     19    [facility] DRAW   2:00 PM   (15 min.)   [facility] DRAW   [facility] Lab Draw    [facility] ONC INFUSION 60   2:30 PM   (60 min.)    ONCOLOGY INFUSION   [facility] 20     21       22     23     24    LAB WITH  CLINIC   2:00 PM   (15 min.)    LAB   [facility] Lab    [facility] RETURN PRIMARY PULM   2:15 PM   (30 min.)   Francisco J Edwards MD   [facility] 25    [facility] DRAW   4:30 PM   (15 min.)    [facility] DRAW   [facility]    [facility] ONC  INFUSION 60   5:00 PM   (60 min.)    ONCOLOGY INFUSION   [facility] 26 27 28 29 30 October 2019 Sunday Monday Tuesday Wednesday Thursday Friday Saturday             1     2     3     4     5       6     7     8     9     10     11     12       13     14     15     16     17     18     19       20     21     22     23    NEW REFERRED INSIDE [place]   1:00 PM   (60 min.)   Sharmila Gregory MD   [facility] 24     25     26       27     28     29     30     31                               Lab Results:  Recent Results (from the past 12 hour(s))   Ferritin    Collection Time: 09/19/19  2:21 PM   Result Value Ref Range    Ferritin 36 8 - 252 ng/mL   CBC with platelets differential    Collection Time: 09/19/19  2:21 PM   Result Value Ref Range    WBC 7.5 4.0 - 11.0 10e9/L    RBC Count 4.65 3.8 - 5.2 10e12/L    Hemoglobin 16.2 (H) 11.7 - 15.7 g/dL    Hematocrit 45.6 35.0 - 47.0 %    MCV 98 78 - 100 fl    MCH 34.8 (H) 26.5 - 33.0 pg    MCHC 35.5 31.5 - 36.5 g/dL    RDW 12.2 10.0 - 15.0 %    Platelet Count 339 150 - 450 10e9/L    Diff Method Automated Method     % Neutrophils 77.3 %    % Lymphocytes 14.5 %    % Monocytes 5.6 %    % Eosinophils 1.2 %    % Basophils 0.5 %    % Immature Granulocytes 0.9 %    Nucleated RBCs 0 0 /100    Absolute Neutrophil 5.8 1.6 - 8.3 10e9/L    Absolute Lymphocytes 1.1 0.8 - 5.3 10e9/L    Absolute Monocytes 0.4 0.0 - 1.3 10e9/L    Absolute Eosinophils 0.1 0.0 - 0.7 10e9/L    Absolute Basophils 0.0 0.0 - 0.2 10e9/L    Abs Immature Granulocytes 0.1 0 - 0.4 10e9/L    Absolute Nucleated RBC 0.0

## 2019-09-20 NOTE — PROGRESS NOTES
Francisco J Edwards M.D.  Cardiovascular Medicine    I personally saw and examined this patient, discussed care with housestaff and other consultants, reviewed current laboratories and imaging studies, and conveyed impression and diagnostic/therapeutic plan to patient.    Loida Stockton MD    [address] [place] [address]    [phone] (Fax)        Reina Morillo MD    [address], [place] [id number] [phone]    Problem List  1. Possible pulmonary hypertension  2. History of breast cancer   History of mastectomy   Breast reconstruction  3. Atherosclerosis with coronary calcifications  4. Hiatal hernia  5. Hepatic steatosis  6. Diverticulosis  7. KYAW with treatment  8. Psoriasis  9. Grave's disease with ablation  10. Hypertension  11. Asthma  12. Hemochromatosis,gene +  13. Bipolar disorder ?  14. Chronic pain with narcotic dependence  15. Pheochromocytoma (right surgically removed)  16. Elevated body mass index  17. Elevate hemoglobin with macrocytosis  18  Hiatal hernia    History    64 year old white female seen for possible pulmonary hypertension as noted by CT scan enlargement of the pulmonary arteries.  She has no history of collagen vascular disease, but does have histories of ETOH abuse, obesity, sleep disordered breath, obstructive lung disease and hepatic steatosis noted. She has exertional shortness of breath without wheezes, hemoptysis, defined PE/DVT       She has been drinking ETOH for two months and up to 2 bottles per day.  She has remote history of alcoholism.  She is not suicidal.  She is depressed .      She has no clearcut history of angina, but does have risk factors including smoking, lipids, blood pressure, obesity and KYAW.  Strong family history of ASHD    Patient has family history of hemochromatosis.  Gene study equivocal.  She has had phlebotomy.    She continues to have chest and arm pain with exertion and relieved by rest.   It has not occurred nocturnally.  There has been no acceleration of frequency or occurrence with more moderate exercise    Wt Readings from Last 24 Encounters:   09/24/19 119 kg (262 lb 4.8 oz)   09/19/19 119.4 kg (263 lb 3.2 oz)   09/13/19 120.7 kg (266 lb 1.6 oz)   09/04/19 118.9 kg (262 lb 3.2 oz)   08/13/19 117.5 kg (259 lb)   06/29/15 92.1 kg (203 lb)           Objective  Constitutional: alert, oriented, normal gait and station, normal mentation.  Oral: moist mucous membranes  Lymph: without pathologic adenopathy  Chest: clear to ausculation and percussion save for basilar rales  Cor: No evidence of left or right ventricular activity.  Rhythm is regular.  S1 normal, S2 split physiologically. Murmurs are not present  Abdomen: without tenderness, rebound, guarding, masses, ascites  Extremities: Edema not present, surgical scars  Neuro: no focal defects, normal mentation  Skin: without open lesions  Psych: oriented, verbal, mental status in tact    Meds    Current Outpatient Medications   Medication     albuterol (ACCUNEB) 0.63 MG/3ML neb solution     albuterol (PROAIR HFA, PROVENTIL HFA, VENTOLIN HFA) 108 (90 BASE) MCG/ACT inhaler     ASPIRIN PO     budesonide-formoterol (SYMBICORT) 160-4.5 MCG/ACT Inhaler     CYANOCOBALAMIN PO     Desvenlafaxine Succinate (PRISTIQ PO)     diclofenac (VOLTAREN) 1 % topical gel     FOLIC ACID PO     hydrochlorothiazide (MICROZIDE) 12.5 MG capsule     HYDROcodone-acetaminophen (NORCO)  MG per tablet     HYDROcodone-acetaminophen (NORCO) 7.5-325 MG per tablet     Levothyroxine Sodium (SYNTHROID PO)     LISINOPRIL PO     Menaquinone-7 (VITAMIN K2 PO)     Omeprazole (PRILOSEC PO)     Pramipexole Dihydrochloride (MIRAPEX PO)     vitamin D (ERGOCALCIFEROL) 83741 UNIT capsule     No current facility-administered medications for this visit.      Labs    Results for ERIK BOSS (MRN [id number]) as of 9/20/2019 16:45   Ref. Range 8/13/2019 15:42 8/13/2019 17:43    Sodium Latest Ref Range: 133 - 144 mmol/L  139   Potassium Latest Ref Range: 3.4 - 5.3 mmol/L  3.5   Chloride Latest Ref Range: 94 - 109 mmol/L  100   Carbon Dioxide Latest Ref Range: 20 - 32 mmol/L  32   Urea Nitrogen Latest Ref Range: 7 - 30 mg/dL  15   Creatinine Latest Ref Range: 0.52 - 1.04 mg/dL  0.83   GFR Estimate Latest Ref Range: >60 mL/min/1.73_m2  73   GFR Estimate If Black Latest Ref Range: >60 mL/min/1.73_m2  85   Calcium Latest Ref Range: 8.5 - 10.1 mg/dL  9.4   Anion Gap Latest Ref Range: 3 - 14 mmol/L  6   Albumin Latest Ref Range: 3.4 - 5.0 g/dL  4.0   Protein Total Latest Ref Range: 6.8 - 8.8 g/dL  7.6   Bilirubin Total Latest Ref Range: 0.2 - 1.3 mg/dL  0.6   Alkaline Phosphatase Latest Ref Range: 40 - 150 U/L  77   ALT Latest Ref Range: 0 - 50 U/L  47   AST Latest Ref Range: 0 - 45 U/L  34   Cholesterol Latest Ref Range: <200 mg/dL  221 (H)   HDL Cholesterol Latest Ref Range: >49 mg/dL  78   Iron Latest Ref Range: 35 - 180 ug/dL  237 (H)   Iron Binding Cap Latest Ref Range: 240 - 430 ug/dL  344   Iron Saturation Index Latest Ref Range: 15 - 46 %  69 (H)   LDL Cholesterol Calculated Latest Ref Range: <100 mg/dL  99   Non HDL Cholesterol Latest Ref Range: <130 mg/dL  143 (H)   N-Terminal Pro Bnp Latest Ref Range: 0 - 125 pg/mL  53   T4 Free Latest Ref Range: 0.76 - 1.46 ng/dL  0.87   Triglycerides Latest Ref Range: <150 mg/dL  220 (H)   TSH Latest Ref Range: 0.40 - 4.00 mU/L  9.15 (H)   Glucose Latest Ref Range: 70 - 99 mg/dL  104 (H)   WBC Latest Ref Range: 4.0 - 11.0 10e9/L  9.1   Hemoglobin Latest Ref Range: 11.7 - 15.7 g/dL  17.6 (H)   Hematocrit Latest Ref Range: 35.0 - 47.0 %  50.3 (H)   Platelet Count Latest Ref Range: 150 - 450 10e9/L  234   RBC Count Latest Ref Range: 3.8 - 5.2 10e12/L  4.99   MCV Latest Ref Range: 78 - 100 fl  101 (H)   MCH Latest Ref Range: 26.5 - 33.0 pg  35.3 (H)   MCHC Latest Ref Range: 31.5 - 36.5 g/dL  35.0   RDW Latest Ref Range: 10.0 - 15.0 %  11.8    ECHOCARDIOGRAM COMPLETE Unknown Rpt      COMPLEX (SEE RESULT AND INTERPRETATION)      Result SEE BELOW   Comment:  C282Y: Two copies of the C282Y mutation were identified.  H63D: Not detected.     Interpretation SEE BELOW   Comment:  This result may be consistent with, but does not confirm a  diagnosis of or predisposition for hereditary  hemochromatosis (HH).    Gender and serum ferritin level are known to impact the  penetrance of the p.C282Y allele. Men homozygous for  p.C282Y mutations are at considerably higher risk to  exhibit symptoms than women with the same genotype.  Individuals with two copies of the p.C282Y           Imaging   Name: ERIK BOSS  MRN: [id number]  : 1955  Study Date: 2019 03:04 PM  Age: 64 yrs  Gender: Female  [facility]  Reason For Study: Pulmonary hypertension (H)  Ordering Physician: MINO CORTES  Referring Physician: MINO CORTES  Performed By: Siobhan Dee [facility]     BSA: 2.2 m2  Height: 66 in  Weight: 259 lb  BP: 118/76 mmHg  _____________________________________________________________________________  __        Procedure  Echocardiogram with two-dimensional, color and spectral Doppler performed.  Poor acoustic windows. Optison (NDC #2670-0987-05) given intravenously.  Patient was given 3.0 ml mixture of 3 ml Optison and 6 ml saline. 6.0 ml  wasted. IV start location R Hand .  _____________________________________________________________________________  __        Interpretation Summary  1. Global and regional left ventricular function is normal with an EF of 55-  60%.  2. The right ventricle is normal size. Global right ventricular function is  mildly reduced.  3. No significant valvular disease.  4. Unable to assess pulmonary artery pressure.  5. IVC diameter <2.1 cm collapsing >50% with sniff suggests a normal RA  pressure of 3 mmHg.     There is no prior study for direct  comparison.  _____________________________________________________________________________  __        Left Ventricle  Left ventricular size is normal. Global and regional left ventricular function  is normal with an EF of 55-60%. Relative wall thickness is increased  consistent with concentric remodeling. Left ventricular diastolic function is  not assessable. No regional wall motion abnormalities are seen.     Right Ventricle  The right ventricle is normal size. Global right ventricular function is  mildly reduced.     Atria  Both atria appear normal. Unable to assess for any intra-atrial shunt.     Mitral Valve  Mild to moderate mitral annular calcification is present. Trace mitral  insufficiency is present.        Aortic Valve  The aortic valve is tricuspid. Mild aortic insufficiency is present.     Tricuspid Valve  The tricuspid valve is normal. Pulmonary artery systolic pressure cannot be  assessed. The peak velocity of the tricuspid regurgitant jet is not  obtainable. Trace tricuspid insufficiency is present.     Pulmonic Valve  The pulmonic valve is normal.     Vessels  Normal diameter aortic root and proximal ascending aorta. IVC diameter <2.1 cm  collapsing >50% with sniff suggests a normal RA pressure of 3 mmHg.     Pericardium  No pericardial effusion is present. Prominent epicardial fat is noted.        Compared to Previous Study  There is no prior study for direct comparison.  _____________________________________________________________________________  __  MMode/2D Measurements & Calculations     IVSd: 1.1 cm  LVIDd: 3.4 cm  LVIDs: 2.3 cm  LVPWd: 1.1 cm  FS: 31.0 %  LV mass(C)d: 111.7 grams  LV mass(C)dI: 50.0 grams/m2  Ao root diam: 3.3 cm  asc Aorta Diam: 3.4 cm  LVOT diam: 2.2 cm  LVOT area: 3.9 cm2  LA Volume (BP): 53.0 ml  LA Volume Index (BP): 23.8 ml/m2  RWT: 0.63           Doppler Measurements & Calculations  MV E max marley: 52.8 cm/sec  MV A max marley: 94.8 cm/sec  MV E/A: 0.56  MV max P.1  mmHg  MV mean P.2 mmHg  MV V2 VTI: 23.0 cm  MV dec time: 0.22 sec  AI P1/2t: 654.1 msec  PA acc time: 0.11 sec  E/E' av.3  Lateral E/e': 9.5  Medial E/e': 13.2        _______________  Assessment/Plan   1. Patient is now ETOH free  2. Patient is losing weight  3. Schedule for right heart catherization and coronary angiogram           CC:    [facility]  Loida Stockton M.D.        Answers for HPI/ROS submitted by the patient on 2019   General Symptoms: Yes  Skin Symptoms: No  HENT Symptoms: No  EYE SYMPTOMS: Yes  HEART SYMPTOMS: Yes  LUNG SYMPTOMS: Yes  INTESTINAL SYMPTOMS: No  URINARY SYMPTOMS: Yes  GYNECOLOGIC SYMPTOMS: No  BREAST SYMPTOMS: No  SKELETAL SYMPTOMS: Yes  BLOOD SYMPTOMS: No  NERVOUS SYSTEM SYMPTOMS: Yes  MENTAL HEALTH SYMPTOMS: Yes  Fever: No  Loss of appetite: No  Weight loss: Yes  Weight gain: No  Fatigue: Yes  Night sweats: No  Chills: No  Increased stress: Yes  Excessive hunger: Yes  Excessive thirst: No  Feeling hot or cold when others believe the temperature is normal: Yes  Loss of height: No  Post-operative complications: Yes  Surgical site pain: No  Hallucinations: No  Change in or Loss of Energy: Yes  Hyperactivity: No  Confusion: No  Eye pain: Yes  Vision loss: Yes  Dry eyes: Yes  Watery eyes: No  Eye bulging: No  Double vision: No  Flashing of lights: No  Spots: No  Floaters: Yes  Redness: No  Crossed eyes: No  Tunnel Vision: No  Yellowing of eyes: No  Eye irritation: No  Cough: Yes  Sputum or phlegm: Yes  Coughing up blood: No  Difficulty breating or shortness of breath: Yes  Snoring: Yes  Wheezing: Yes  Difficulty breathing on exertion: Yes  Nighttime Cough: Yes  Difficulty breathing when lying flat: No  Chest pain or pressure: Yes  Fast or irregular heartbeat: No  Pain in legs with walking: Yes  Trouble breathing while lying down: No  Fingers or toes appear blue: No  High blood pressure: No  Low blood pressure: No  Fainting: No  Murmurs: No  Pacemaker:  No  Varicose veins: No  Edema or swelling: No  Wake up at night with shortness of breath: No  Light-headedness: Yes  Exercise intolerance: Yes  Trouble holding urine or incontinence: Yes  Pain or burning: No  Trouble starting or stopping: Yes  Increased frequency of urination: No  Blood in urine: No  Decreased frequency of urination: No  Frequent nighttime urination: No  Flank pain: No  Difficulty emptying bladder: Yes  Back pain: Yes  Muscle aches: Yes  Neck pain: Yes  Swollen joints: No  Joint pain: Yes  Bone pain: No  Muscle cramps: No  Muscle weakness: Yes  Joint stiffness: Yes  Bone fracture: No  Trouble with coordination: No  Dizziness or trouble with balance: No  Fainting or black-out spells: No  Memory loss: No  Headache: No  Seizures: No  Speech problems: No  Tingling: No  Tremor: No  Weakness: No  Difficulty walking: No  Paralysis: No  Numbness: No  Nervous or Anxious: No  Depression: Yes  Trouble sleeping: Yes  Trouble thinking or concentrating: Yes  Mood changes: Yes  Panic attacks: Yes

## 2019-09-23 ENCOUNTER — RECORDS - HEALTHEAST (OUTPATIENT)
Dept: ADMINISTRATIVE | Facility: OTHER | Age: 64
End: 2019-09-23

## 2019-09-23 DIAGNOSIS — I27.20 PULMONARY HYPERTENSION (H): Primary | ICD-10-CM

## 2019-09-23 DIAGNOSIS — R06.02 SOB (SHORTNESS OF BREATH): ICD-10-CM

## 2019-09-24 ENCOUNTER — RECORDS - HEALTHEAST (OUTPATIENT)
Dept: ADMINISTRATIVE | Facility: OTHER | Age: 64
End: 2019-09-24

## 2019-09-24 ENCOUNTER — OFFICE VISIT (OUTPATIENT)
Dept: CARDIOLOGY | Facility: CLINIC | Age: 64
End: 2019-09-24
Attending: INTERNAL MEDICINE
Payer: MEDICARE

## 2019-09-24 VITALS
DIASTOLIC BLOOD PRESSURE: 81 MMHG | HEIGHT: 66 IN | OXYGEN SATURATION: 95 % | SYSTOLIC BLOOD PRESSURE: 123 MMHG | HEART RATE: 92 BPM | BODY MASS INDEX: 42.16 KG/M2 | WEIGHT: 262.3 LBS

## 2019-09-24 DIAGNOSIS — E61.1 IRON DEFICIENCY: ICD-10-CM

## 2019-09-24 DIAGNOSIS — I27.20 PULMONARY HYPERTENSION (H): ICD-10-CM

## 2019-09-24 DIAGNOSIS — R06.02 SOB (SHORTNESS OF BREATH): ICD-10-CM

## 2019-09-24 DIAGNOSIS — E78.5 DYSLIPIDEMIA: ICD-10-CM

## 2019-09-24 DIAGNOSIS — E83.119 HEMOCHROMATOSIS, UNSPECIFIED HEMOCHROMATOSIS TYPE: ICD-10-CM

## 2019-09-24 LAB
ANION GAP SERPL CALCULATED.3IONS-SCNC: 6 MMOL/L (ref 3–14)
BASOPHILS # BLD AUTO: 0 10E9/L (ref 0–0.2)
BASOPHILS NFR BLD AUTO: 0.5 %
BUN SERPL-MCNC: 15 MG/DL (ref 7–30)
CALCIUM SERPL-MCNC: 9.1 MG/DL (ref 8.5–10.1)
CHLORIDE SERPL-SCNC: 99 MMOL/L (ref 94–109)
CO2 SERPL-SCNC: 31 MMOL/L (ref 20–32)
CREAT SERPL-MCNC: 0.69 MG/DL (ref 0.52–1.04)
CREAT SERPL-MCNC: 0.69 MG/DL (ref 0.52–1.04)
DIFFERENTIAL METHOD BLD: ABNORMAL
EOSINOPHIL # BLD AUTO: 0.1 10E9/L (ref 0–0.7)
EOSINOPHIL NFR BLD AUTO: 0.9 %
ERYTHROCYTE [DISTWIDTH] IN BLOOD BY AUTOMATED COUNT: 11.9 % (ref 10–15)
FERRITIN SERPL-MCNC: 33 NG/ML (ref 8–252)
GFR ESTIMATE EXT - HISTORICAL: >90 ML/MIN/1.73M2
GFR SERPL CREATININE-BSD FRML MDRD: >90 ML/MIN/{1.73_M2}
GLUCOSE SERPL-MCNC: 118 MG/DL (ref 70–99)
HCT VFR BLD AUTO: 44.9 % (ref 35–47)
HGB BLD-MCNC: 15.7 G/DL (ref 11.7–15.7)
IMM GRANULOCYTES # BLD: 0.1 10E9/L (ref 0–0.4)
IMM GRANULOCYTES NFR BLD: 0.8 %
IRON SATN MFR SERPL: 25 % (ref 15–46)
IRON SERPL-MCNC: 92 UG/DL (ref 35–180)
LYMPHOCYTES # BLD AUTO: 1.1 10E9/L (ref 0.8–5.3)
LYMPHOCYTES NFR BLD AUTO: 13.9 %
MCH RBC QN AUTO: 34.7 PG (ref 26.5–33)
MCHC RBC AUTO-ENTMCNC: 35 G/DL (ref 31.5–36.5)
MCV RBC AUTO: 99 FL (ref 78–100)
MONOCYTES # BLD AUTO: 0.5 10E9/L (ref 0–1.3)
MONOCYTES NFR BLD AUTO: 6.4 %
NEUTROPHILS # BLD AUTO: 5.9 10E9/L (ref 1.6–8.3)
NEUTROPHILS NFR BLD AUTO: 77.5 %
NRBC # BLD AUTO: 0 10*3/UL
NRBC BLD AUTO-RTO: 0 /100
NT-PROBNP SERPL-MCNC: 41 PG/ML (ref 0–125)
PLATELET # BLD AUTO: 247 10E9/L (ref 150–450)
POTASSIUM SERPL-SCNC: 3.3 MMOL/L (ref 3.4–5.3)
RBC # BLD AUTO: 4.52 10E12/L (ref 3.8–5.2)
SODIUM SERPL-SCNC: 136 MMOL/L (ref 133–144)
TIBC SERPL-MCNC: 362 UG/DL (ref 240–430)
WBC # BLD AUTO: 7.6 10E9/L (ref 4–11)

## 2019-09-24 PROCEDURE — 80048 BASIC METABOLIC PNL TOTAL CA: CPT | Performed by: INTERNAL MEDICINE

## 2019-09-24 PROCEDURE — 83880 ASSAY OF NATRIURETIC PEPTIDE: CPT | Performed by: INTERNAL MEDICINE

## 2019-09-24 PROCEDURE — 99215 OFFICE O/P EST HI 40 MIN: CPT | Mod: ZP | Performed by: INTERNAL MEDICINE

## 2019-09-24 PROCEDURE — 36415 COLL VENOUS BLD VENIPUNCTURE: CPT | Performed by: INTERNAL MEDICINE

## 2019-09-24 PROCEDURE — 83550 IRON BINDING TEST: CPT | Performed by: INTERNAL MEDICINE

## 2019-09-24 PROCEDURE — 83540 ASSAY OF IRON: CPT | Performed by: INTERNAL MEDICINE

## 2019-09-24 PROCEDURE — G0463 HOSPITAL OUTPT CLINIC VISIT: HCPCS | Mod: ZF

## 2019-09-24 RX ORDER — TRIAMTERENE/HYDROCHLOROTHIAZID 37.5-25 MG
1 TABLET ORAL DAILY
Refills: 11 | COMMUNITY
Start: 2019-07-24 | End: 2019-10-22

## 2019-09-24 RX ORDER — LIDOCAINE 40 MG/G
CREAM TOPICAL
Status: CANCELLED | OUTPATIENT
Start: 2019-09-24

## 2019-09-24 RX ORDER — DULOXETIN HYDROCHLORIDE 60 MG/1
CAPSULE, DELAYED RELEASE ORAL
Refills: 1 | COMMUNITY
Start: 2019-06-11 | End: 2019-11-18

## 2019-09-24 RX ORDER — DULOXETIN HYDROCHLORIDE 30 MG/1
CAPSULE, DELAYED RELEASE ORAL
Refills: 0 | COMMUNITY
Start: 2019-07-24 | End: 2019-11-18

## 2019-09-24 RX ORDER — HYDROXYZINE PAMOATE 25 MG/1
25 CAPSULE ORAL 3 TIMES DAILY PRN
Refills: 0 | COMMUNITY
Start: 2019-09-12 | End: 2020-09-21

## 2019-09-24 RX ORDER — BUPRENORPHINE 10 UG/H
PATCH, EXTENDED RELEASE TRANSDERMAL
Refills: 0 | COMMUNITY
Start: 2019-08-15 | End: 2019-11-18

## 2019-09-24 ASSESSMENT — ENCOUNTER SYMPTOMS
POLYPHAGIA: 1
PANIC: 1
LOSS OF CONSCIOUSNESS: 0
DYSURIA: 0
MUSCLE WEAKNESS: 1
SEIZURES: 0
NERVOUS/ANXIOUS: 0
SPEECH CHANGE: 0
DOUBLE VISION: 0
INCREASED ENERGY: 1
LEG PAIN: 1
COUGH DISTURBING SLEEP: 1
LIGHT-HEADEDNESS: 1
PARALYSIS: 0
PALPITATIONS: 0
STIFFNESS: 1
MEMORY LOSS: 0
DIZZINESS: 0
COUGH: 1
EYE WATERING: 0
HYPOTENSION: 0
DECREASED CONCENTRATION: 1
DECREASED APPETITE: 0
SHORTNESS OF BREATH: 1
WHEEZING: 1
SPUTUM PRODUCTION: 1
MYALGIAS: 1
SLEEP DISTURBANCES DUE TO BREATHING: 0
POSTURAL DYSPNEA: 0
TINGLING: 0
HYPERTENSION: 0
BACK PAIN: 1
SYNCOPE: 0
TREMORS: 0
DISTURBANCES IN COORDINATION: 0
CHILLS: 0
HEADACHES: 0
DEPRESSION: 1
FEVER: 0
WEIGHT GAIN: 0
EXERCISE INTOLERANCE: 1
NUMBNESS: 0
EYE REDNESS: 0
EYE PAIN: 1
DIFFICULTY URINATING: 1
SNORES LOUDLY: 1
HALLUCINATIONS: 0
ORTHOPNEA: 0
ARTHRALGIAS: 1
FLANK PAIN: 0
MUSCLE CRAMPS: 0
JOINT SWELLING: 0
NECK PAIN: 1
POLYDIPSIA: 0
NIGHT SWEATS: 0
EYE IRRITATION: 0
INSOMNIA: 1
HEMOPTYSIS: 0
DYSPNEA ON EXERTION: 1
WEAKNESS: 0
ALTERED TEMPERATURE REGULATION: 1
HEMATURIA: 0
WEIGHT LOSS: 1
FATIGUE: 1

## 2019-09-24 ASSESSMENT — PAIN SCALES - GENERAL: PAINLEVEL: NO PAIN (0)

## 2019-09-24 ASSESSMENT — MIFFLIN-ST. JEOR: SCORE: 1746.53

## 2019-09-24 ASSESSMENT — PATIENT HEALTH QUESTIONNAIRE - PHQ9: SUM OF ALL RESPONSES TO PHQ QUESTIONS 1-9: 9

## 2019-09-24 NOTE — NURSING NOTE
"Chief Complaint   Patient presents with     Follow Up     1 month return PH follow up with labs prior; did not have sleep study done (Echo 8/13 - \"The tricuspid valve is normal. Pulmonary artery systolic pressure cannot be assessed.\")     Vitals were taken and medications were reconciled.   Jayshree Arnett  2:36 PM    "

## 2019-09-24 NOTE — PATIENT INSTRUCTIONS
Medication Changes:  No medication changes at this time. Please continue current medication regiment.    Patient Instructions:  1. Continue staying active and eat a heart healthy diet.    2. Please keep current list of medications with you at all times.    3. Remember to weigh yourself daily after voiding and before you consume any food or beverages and log the numbers.  If you have gained 2 pounds overnight or 5 pounds in a week contact us immediately for medication adjustments or further instructions.    4. **Please call us immediately if you have any syncope (fainting or passing out), chest pain, edema (swelling or weight gain), or decline in your functional status (general decline in how you are feeling).    Follow up Appointment Information:  - Right Heart catheterization and coronary angiogram on October 3rd at 11:30AM  - Follow up with Dr. Edwards after the procedure. We will call to schedule this at a later date.   - Eat potassium rich foods when you go home. Your potassium was slightly low. We will follow up with labs next week to monitor.     Check-In  Time Check-In Location Estimated Length Procedure   Name     October 3rd  11:30AM   [facility]  waiting room  minutes Right Heart Catheterization, Angiogram**     Procedure Preparations & Instructions     This is an invasive procedure that DOES require preparation:    - Nothing to eat or drink for 6 hours   - A ride should be arranged for you as you will be unable to drive home.  You will be required to lay flat for approximately 2-4 hours in the recovery unit to ensure proper clotting of the artery.  - Take 325 mg of Asprin 24 hours prior to procedure and morning of procedure.        Results:  Component      Latest Ref Rng & Units 9/24/2019   Sodium      133 - 144 mmol/L 136   Potassium      3.4 - 5.3 mmol/L 3.3 (L)   Chloride      94 - 109 mmol/L 99   Carbon Dioxide      20 - 32 mmol/L 31   Anion Gap      3 - 14 mmol/L 6   Glucose      70 - 99 mg/dL 118  (H)   Urea Nitrogen      7 - 30 mg/dL 15   Creatinine      0.52 - 1.04 mg/dL 0.69   GFR Estimate      >60 mL/min/1.73:m2 >90   GFR Estimate If Black      >60 mL/min/1.73:m2 >90   Calcium      8.5 - 10.1 mg/dL 9.1   N-Terminal Pro Bnp      0 - 125 pg/mL 41       We are located on the third floor of the Clinic [facility] ([facility]) on the [facility].  Our address is     [address] [place] [address] on 3rd Floor   [place] [id number]    Thank you for allowing us to be a part of your care here at the [facility]    If you have questions or concerns please contact us at:    Guille Walden, RN, BSN   Josephine Pratt (Schedule,Prior Auth)  Nurse Coordinator     Clinic   Pulmonary Hypertension   Pulmonary Hypertension  [facility]  [facility]  (Phone)[phone]    (Phone) [phone]        (Fax) [phone]    ** Please note that you will NOT receive a reminder call regarding your scheduled testing, reminder calls are for provider appointments only.  If you are scheduled for testing within the [employer] system you may receive a call regarding pre-registration for billing purposes only.**     Remember to weigh yourself daily after voiding and before you consume any food or beverages and log the numbers.  If you have gained/lost 2 pounds overnight or 5 pounds in a week contact us immediately for medication adjustments or further instructions.   **Please call us immediately if you have any syncope, chest pain, edema, or decline in your functional status.    Support Group:  Pulmonary Hypertension Association  Https://www.phassociation.org/  **Look at the Events Tab** They even have Support Groups that you can call into    [facility] PH Support Group  Second Saturday of the Month from 1-3 PM   Location: [address] [place] [id number]  Leader: Corry Reeves and Lashonda Harvey  Phone: [phone] or  [phone]  Email: [email or web address]

## 2019-09-24 NOTE — NURSING NOTE
Right Heart Catheterization: Patient was instructed regarding right heart catheterization, including discussion of the procedure, preparation, intra-procedural steps, and recovery at home. Patient demonstrated understanding of this information and agreed to call with further questions or concerns.    Left Heart Catheterization: Patient was instructed regarding left heart catheterization, including discussion of the indication, procedure, preparation, intra-procedural steps, and recovery at home. Patient demonstrated understanding of this information and agreed to call with further questions or concerns.    Labs: Patient was given results of the laboratory testing obtained today. Patient demonstrated understanding of this information and agreed to call with further questions or concerns.     Diet: Patient instructed regarding a heart healthy diet, including discussion of reduced fat and sodium intake. Patient demonstrated understanding of this information and agreed to call with further questions or concerns.    Return Appointment: Patient given instructions regarding scheduling next clinic visit. Patient demonstrated understanding of this information and agreed to call with further questions or concerns.    Patient stated she understood all health information given and agreed to call with further questions or concerns.    Medication Changes:  No medication changes at this time. Please continue current medication regiment.    Patient Instructions:  1. Continue staying active and eat a heart healthy diet.    2. Please keep current list of medications with you at all times.    3. Remember to weigh yourself daily after voiding and before you consume any food or beverages and log the numbers.  If you have gained 2 pounds overnight or 5 pounds in a week contact us immediately for medication adjustments or further instructions.    4. **Please call us immediately if you have any syncope (fainting or passing out), chest  pain, edema (swelling or weight gain), or decline in your functional status (general decline in how you are feeling).    Follow up Appointment Information:  - Right Heart catheterization and coronary angiogram on October 3rd at 11:30AM  - Follow up with Dr. Edwards after the procedure. We will call to schedule this at a later date.   - Eat potassium rich foods when you go home. Your potassium was slightly low. We will follow up with labs next week to monitor.     Check-In  Time Check-In Location Estimated Length Procedure   Name     October 3rd  11:30AM   [facility]  waiting room  minutes Right Heart Catheterization, Angiogram**     Procedure Preparations & Instructions     This is an invasive procedure that DOES require preparation:    - Nothing to eat or drink for 6 hours   - A ride should be arranged for you as you will be unable to drive home.  You will be required to lay flat for approximately 2-4 hours in the recovery unit to ensure proper clotting of the artery.  - Take 325 mg of Asprin 24 hours prior to procedure and morning of procedure.        Results:  Component      Latest Ref Rng & Units 9/24/2019   Sodium      133 - 144 mmol/L 136   Potassium      3.4 - 5.3 mmol/L 3.3 (L)   Chloride      94 - 109 mmol/L 99   Carbon Dioxide      20 - 32 mmol/L 31   Anion Gap      3 - 14 mmol/L 6   Glucose      70 - 99 mg/dL 118 (H)   Urea Nitrogen      7 - 30 mg/dL 15   Creatinine      0.52 - 1.04 mg/dL 0.69   GFR Estimate      >60 mL/min/1.73:m2 >90   GFR Estimate If Black      >60 mL/min/1.73:m2 >90   Calcium      8.5 - 10.1 mg/dL 9.1   N-Terminal Pro Bnp      0 - 125 pg/mL 41     **Patient does not like to take potassium pills. States she will have an extra potassium rich foods tonight. Labs will be rechecked next week with her RHC/angio. Bindu Walden RN on 9/24/2019 at 4:19 PM    **We will schedule a follow up appointment after the C d/t minimal availability in Dr. Edwards's schedule at the moment. Sent a  postponed staff message to myself and Josephine to follow up with patient. Bindu Walden RN on 9/24/2019 at 4:20 PM

## 2019-09-24 NOTE — LETTER
9/24/2019      RE: Randi alegria  [address]  [place] [address]       Dear Colleague,    Thank you for the opportunity to participate in the care of your patient, Randi alegria, at the [facility]. Please see a copy of my visit note below.    Francisco J Edwards M.D.  Cardiovascular Medicine    I personally saw and examined this patient, discussed care with housestaff and other consultants, reviewed current laboratories and imaging studies, and conveyed impression and diagnostic/therapeutic plan to patient.    Loida Stockton MD    [address], [place] [id number] [phone] (Fax)        Reina Morillo MD    [address] [place] [address] [phone]    Problem List  1. Possible pulmonary hypertension  2. History of breast cancer   History of mastectomy   Breast reconstruction  3. Atherosclerosis with coronary calcifications  4. Hiatal hernia  5. Hepatic steatosis  6. Diverticulosis  7. KYAW with treatment  8. Psoriasis  9. Grave's disease with ablation  10. Hypertension  11. Asthma  12. Hemochromatosis,gene +  13. Bipolar disorder ?  14. Chronic pain with narcotic dependence  15. Pheochromocytoma (right surgically removed)  16. Elevated body mass index  17. Elevate hemoglobin with macrocytosis  18  Hiatal hernia    History    64 year old white female seen for possible pulmonary hypertension as noted by CT scan enlargement of the pulmonary arteries.  She has no history of collagen vascular disease, but does have histories of ETOH abuse, obesity, sleep disordered breath, obstructive lung disease and hepatic steatosis noted. She has exertional shortness of breath without wheezes, hemoptysis, defined PE/DVT       She has been drinking ETOH for two months and up to 2 bottles per day.  She has remote history of alcoholism.  She is not suicidal.  She is depressed .      She has  no clearcut history of angina, but does have risk factors including smoking, lipids, blood pressure, obesity and KYAW.  Strong family history of ASHD    Patient has family history of hemochromatosis.  Gene study equivocal.  She has had phlebotomy.    She continues to have chest and arm pain with exertion and relieved by rest.  It has not occurred nocturnally.  There has been no acceleration of frequency or occurrence with more moderate exercise    Wt Readings from Last 24 Encounters:   09/24/19 119 kg (262 lb 4.8 oz)   09/19/19 119.4 kg (263 lb 3.2 oz)   09/13/19 120.7 kg (266 lb 1.6 oz)   09/04/19 118.9 kg (262 lb 3.2 oz)   08/13/19 117.5 kg (259 lb)   06/29/15 92.1 kg (203 lb)           Objective  Constitutional: alert, oriented, normal gait and station, normal mentation.  Oral: moist mucous membranes  Lymph: without pathologic adenopathy  Chest: clear to ausculation and percussion save for basilar rales  Cor: No evidence of left or right ventricular activity.  Rhythm is regular.  S1 normal, S2 split physiologically. Murmurs are not present  Abdomen: without tenderness, rebound, guarding, masses, ascites  Extremities: Edema not present, surgical scars  Neuro: no focal defects, normal mentation  Skin: without open lesions  Psych: oriented, verbal, mental status in tact    Meds    Current Outpatient Medications   Medication     albuterol (ACCUNEB) 0.63 MG/3ML neb solution     albuterol (PROAIR HFA, PROVENTIL HFA, VENTOLIN HFA) 108 (90 BASE) MCG/ACT inhaler     ASPIRIN PO     budesonide-formoterol (SYMBICORT) 160-4.5 MCG/ACT Inhaler     CYANOCOBALAMIN PO     Desvenlafaxine Succinate (PRISTIQ PO)     diclofenac (VOLTAREN) 1 % topical gel     FOLIC ACID PO     hydrochlorothiazide (MICROZIDE) 12.5 MG capsule     HYDROcodone-acetaminophen (NORCO)  MG per tablet     HYDROcodone-acetaminophen (NORCO) 7.5-325 MG per tablet     Levothyroxine Sodium (SYNTHROID PO)     LISINOPRIL PO     Menaquinone-7 (VITAMIN K2 PO)      Omeprazole (PRILOSEC PO)     Pramipexole Dihydrochloride (MIRAPEX PO)     vitamin D (ERGOCALCIFEROL) 05652 UNIT capsule     No current facility-administered medications for this visit.      Labs    Results for ERIK BOSS (MRN [id number]) as of 9/20/2019 16:45   Ref. Range 8/13/2019 15:42 8/13/2019 17:43   Sodium Latest Ref Range: 133 - 144 mmol/L  139   Potassium Latest Ref Range: 3.4 - 5.3 mmol/L  3.5   Chloride Latest Ref Range: 94 - 109 mmol/L  100   Carbon Dioxide Latest Ref Range: 20 - 32 mmol/L  32   Urea Nitrogen Latest Ref Range: 7 - 30 mg/dL  15   Creatinine Latest Ref Range: 0.52 - 1.04 mg/dL  0.83   GFR Estimate Latest Ref Range: >60 mL/min/1.73_m2  73   GFR Estimate If Black Latest Ref Range: >60 mL/min/1.73_m2  85   Calcium Latest Ref Range: 8.5 - 10.1 mg/dL  9.4   Anion Gap Latest Ref Range: 3 - 14 mmol/L  6   Albumin Latest Ref Range: 3.4 - 5.0 g/dL  4.0   Protein Total Latest Ref Range: 6.8 - 8.8 g/dL  7.6   Bilirubin Total Latest Ref Range: 0.2 - 1.3 mg/dL  0.6   Alkaline Phosphatase Latest Ref Range: 40 - 150 U/L  77   ALT Latest Ref Range: 0 - 50 U/L  47   AST Latest Ref Range: 0 - 45 U/L  34   Cholesterol Latest Ref Range: <200 mg/dL  221 (H)   HDL Cholesterol Latest Ref Range: >49 mg/dL  78   Iron Latest Ref Range: 35 - 180 ug/dL  237 (H)   Iron Binding Cap Latest Ref Range: 240 - 430 ug/dL  344   Iron Saturation Index Latest Ref Range: 15 - 46 %  69 (H)   LDL Cholesterol Calculated Latest Ref Range: <100 mg/dL  99   Non HDL Cholesterol Latest Ref Range: <130 mg/dL  143 (H)   N-Terminal Pro Bnp Latest Ref Range: 0 - 125 pg/mL  53   T4 Free Latest Ref Range: 0.76 - 1.46 ng/dL  0.87   Triglycerides Latest Ref Range: <150 mg/dL  220 (H)   TSH Latest Ref Range: 0.40 - 4.00 mU/L  9.15 (H)   Glucose Latest Ref Range: 70 - 99 mg/dL  104 (H)   WBC Latest Ref Range: 4.0 - 11.0 10e9/L  9.1   Hemoglobin Latest Ref Range: 11.7 - 15.7 g/dL  17.6 (H)   Hematocrit Latest Ref Range: 35.0 -  47.0 %  50.3 (H)   Platelet Count Latest Ref Range: 150 - 450 10e9/L  234   RBC Count Latest Ref Range: 3.8 - 5.2 10e12/L  4.99   MCV Latest Ref Range: 78 - 100 fl  101 (H)   MCH Latest Ref Range: 26.5 - 33.0 pg  35.3 (H)   MCHC Latest Ref Range: 31.5 - 36.5 g/dL  35.0   RDW Latest Ref Range: 10.0 - 15.0 %  11.8   ECHOCARDIOGRAM COMPLETE Unknown Rpt      COMPLEX (SEE RESULT AND INTERPRETATION)      Result SEE BELOW   Comment:  C282Y: Two copies of the C282Y mutation were identified.  H63D: Not detected.     Interpretation SEE BELOW   Comment:  This result may be consistent with, but does not confirm a  diagnosis of or predisposition for hereditary  hemochromatosis (HH).    Gender and serum ferritin level are known to impact the  penetrance of the p.C282Y allele. Men homozygous for  p.C282Y mutations are at considerably higher risk to  exhibit symptoms than women with the same genotype.  Individuals with two copies of the p.C282Y           Imaging   Name: ERIK BOSS  MRN: [id number]  : 1955  Study Date: 2019 03:04 PM  Age: 64 yrs  Gender: Female  Patient [facility]  Reason For Study: Pulmonary hypertension (H)  Ordering Physician: MINO CORTES  Referring Physician: MINO CORTES  Performed By: Siobhan Dee, [facility]     BSA: 2.2 m2  Height: 66 in  Weight: 259 lb  BP: 118/76 mmHg  _____________________________________________________________________________  __        Procedure  Echocardiogram with two-dimensional, color and spectral Doppler performed.  Poor acoustic windows. Optison (NDC #4944-3497-89) given intravenously.  Patient was given 3.0 ml mixture of 3 ml Optison and 6 ml saline. 6.0 ml  wasted. IV start location R Hand .  _____________________________________________________________________________  __        Interpretation Summary  1. Global and regional left ventricular function is normal with an EF of 55-  60%.  2. The right ventricle is normal size. Global right  ventricular function is  mildly reduced.  3. No significant valvular disease.  4. Unable to assess pulmonary artery pressure.  5. IVC diameter <2.1 cm collapsing >50% with sniff suggests a normal RA  pressure of 3 mmHg.     There is no prior study for direct comparison.  _____________________________________________________________________________  __        Left Ventricle  Left ventricular size is normal. Global and regional left ventricular function  is normal with an EF of 55-60%. Relative wall thickness is increased  consistent with concentric remodeling. Left ventricular diastolic function is  not assessable. No regional wall motion abnormalities are seen.     Right Ventricle  The right ventricle is normal size. Global right ventricular function is  mildly reduced.     Atria  Both atria appear normal. Unable to assess for any intra-atrial shunt.     Mitral Valve  Mild to moderate mitral annular calcification is present. Trace mitral  insufficiency is present.        Aortic Valve  The aortic valve is tricuspid. Mild aortic insufficiency is present.     Tricuspid Valve  The tricuspid valve is normal. Pulmonary artery systolic pressure cannot be  assessed. The peak velocity of the tricuspid regurgitant jet is not  obtainable. Trace tricuspid insufficiency is present.     Pulmonic Valve  The pulmonic valve is normal.     Vessels  Normal diameter aortic root and proximal ascending aorta. IVC diameter <2.1 cm  collapsing >50% with sniff suggests a normal RA pressure of 3 mmHg.     Pericardium  No pericardial effusion is present. Prominent epicardial fat is noted.        Compared to Previous Study  There is no prior study for direct comparison.  _____________________________________________________________________________  __  MMode/2D Measurements & Calculations     IVSd: 1.1 cm  LVIDd: 3.4 cm  LVIDs: 2.3 cm  LVPWd: 1.1 cm  FS: 31.0 %  LV mass(C)d: 111.7 grams  LV mass(C)dI: 50.0 grams/m2  Ao root diam: 3.3 cm  asc  Aorta Diam: 3.4 cm  LVOT diam: 2.2 cm  LVOT area: 3.9 cm2  LA Volume (BP): 53.0 ml  LA Volume Index (BP): 23.8 ml/m2  RWT: 0.63           Doppler Measurements & Calculations  MV E max marley: 52.8 cm/sec  MV A max marley: 94.8 cm/sec  MV E/A: 0.56  MV max P.1 mmHg  MV mean P.2 mmHg  MV V2 VTI: 23.0 cm  MV dec time: 0.22 sec  AI P1/2t: 654.1 msec  PA acc time: 0.11 sec  E/E' av.3  Lateral E/e': 9.5  Medial E/e': 13.2  _______________  Assessment/Plan   1. Patient is now ETOH free  2. Patient is losing weight  3. Schedule for right heart catherization and coronary angiogram       CC:  [facility] Stockton M.D.    Please do not hesitate to contact me if you have any questions/concerns.     Sincerely,     Francisco J Edwards MD

## 2019-09-25 ENCOUNTER — INFUSION THERAPY VISIT (OUTPATIENT)
Dept: ONCOLOGY | Facility: CLINIC | Age: 64
End: 2019-09-25
Attending: INTERNAL MEDICINE
Payer: MEDICARE

## 2019-09-25 VITALS
DIASTOLIC BLOOD PRESSURE: 81 MMHG | WEIGHT: 261.2 LBS | RESPIRATION RATE: 18 BRPM | SYSTOLIC BLOOD PRESSURE: 126 MMHG | TEMPERATURE: 98.3 F | HEART RATE: 87 BPM | OXYGEN SATURATION: 92 % | BODY MASS INDEX: 42.16 KG/M2

## 2019-09-25 DIAGNOSIS — E83.119 HEMOCHROMATOSIS, UNSPECIFIED HEMOCHROMATOSIS TYPE: Primary | ICD-10-CM

## 2019-09-25 PROCEDURE — 99195 PHLEBOTOMY: CPT

## 2019-09-25 ASSESSMENT — PAIN SCALES - GENERAL: PAINLEVEL: SEVERE PAIN (6)

## 2019-09-25 NOTE — PROGRESS NOTES
Infusion Nursing Note:  Randi alegria presents today for phlebotomy.    Patient seen by provider today: No   present during visit today: Not Applicable.    Note: Pt reports to clinic today with no new complaints or concerns.  Pt slightly fatigued but baseline.  EMT called to place IV for phlebotomy.  IV patent, draining well.  Pt remained vitally stable throughout and denied dizziness or lightheadedness.      Intravenous Access:  Peripheral IV placed.    Treatment Conditions:  Lab Results   Component Value Date    HGB 15.7 09/24/2019     Lab Results   Component Value Date    WBC 7.6 09/24/2019      Lab Results   Component Value Date    ANEU 5.9 09/24/2019     Lab Results   Component Value Date     09/24/2019      Lab Results   Component Value Date     09/24/2019                   Lab Results   Component Value Date    POTASSIUM 3.3 09/24/2019           No results found for: MAG         Lab Results   Component Value Date    CR 0.69 09/24/2019                   Lab Results   Component Value Date    LINDA 9.1 09/24/2019                Lab Results   Component Value Date    BILITOTAL 0.6 08/13/2019           Lab Results   Component Value Date    ALBUMIN 4.0 08/13/2019                    Lab Results   Component Value Date    ALT 47 08/13/2019           Lab Results   Component Value Date    AST 34 08/13/2019       Results reviewed, labs MET treatment parameters, ok to proceed with treatment.    BRISA Hill/David Austin RN 1330 Pt had labs drawn yesterday in cardiology lab, MD weathers and ok'd to use yesterday's labs for today's phlebotomy parameters.        Post Infusion Assessment:  Patient tolerated phlebotomy without incident.  269 mL blood taken out from patient.   Blood return noted pre and post infusion.  Site patent and intact, free from redness, edema or discomfort.  No evidence of extravasations.  Access discontinued per protocol.   Vitals taken pre and post-phlebotomy, stable throughout,  pt denied dizziness or being lightheaded.    Discharge Plan:   Patient declined prescription refills.  Discharge instructions reviewed with: Patient.  Patient and/or family verbalized understanding of discharge instructions and all questions answered.  AVS to patient via [employer].  Patient will return 12/02 for next appointment with Dr. Hill  Patient discharged in stable condition accompanied by: self.  Departure Mode: Ambulatory.    Jeff Austin, RN, RN

## 2019-09-25 NOTE — PATIENT INSTRUCTIONS
Contact numbers:    Triage/[phone]    Call with chills and/or temperature greater than or equal to 100.5 and questions or concerns.    If after hours, weekends, or holidays, call main hospital  at  [phone] and ask for Oncology doctor on call.            1     2     3     4    [facility] NEW  11:00 AM   (60 min.)   Sara Hill MD   [facility] DRAW   1:15 PM   (15 min.)    [facility] DRAW   [facility] Lab Draw 5     6     7       8     9     10     11     12     13    [facility] DRAW   2:00 PM   (15 min.)    [facility] LAB DRAW   [facility] Lab Draw    P ONC INFUSION 60   2:30 PM   (60 min.)    ONCOLOGY INFUSION   [facility] 14       15     16     17     18     19    [facility] DRAW   2:00 PM   (15 min.)    [facility] LAB DRAW   [facility] Lab Draw    P ONC INFUSION 60   2:30 PM   (60 min.)    ONCOLOGY INFUSION   [facility] 20     21       22     23     24    LAB WITH  CLINIC   2:00 PM   (15 min.)    LAB   [facility] Lab    P RETURN PRIMARY PULM   2:15 PM   (30 min.)   Francisco J Edwards MD   [facility] 25    [facility] ONC INFUSION 60   5:00 PM   (60 min.)    ONCOLOGY INFUSION   [facility] 26     27     28       29     30                                                       1     2     3    LAB  11:00 AM   (15 min.)   [facility] LAB GOLD WAITING   [facility], Lab    PROCEDURE - 1.5 HR  11:30 AM   (90 min.)   U2A ROOM 16   Unit 2A [facility]    CV CORONARY ANGIOGRAM   1:00 PM   Davi Contreras MD    HEART CARDIAC CATH LAB 4     5       6     7     8     9     10     11     12       13     14     15     16     17     18     19       20     21     22     23    NEW REFERRED INSIDE [place]   1:00  PM   (60 min.)   Sharmila Gregory MD   [facility] 24     25     26       27     28     29     30     31                               Lab Results:  No results found for this or any previous visit (from the past 12 hour(s)).

## 2019-09-26 ENCOUNTER — COMMUNICATION - HEALTHEAST (OUTPATIENT)
Dept: PALLIATIVE MEDICINE | Facility: OTHER | Age: 64
End: 2019-09-26

## 2019-09-26 ENCOUNTER — TELEPHONE (OUTPATIENT)
Dept: ENDOCRINOLOGY | Facility: CLINIC | Age: 64
End: 2019-09-26

## 2019-09-26 ENCOUNTER — HOSPITAL ENCOUNTER (OUTPATIENT)
Dept: PALLIATIVE MEDICINE | Facility: OTHER | Age: 64
Discharge: HOME | End: 2019-09-26
Attending: SOCIAL WORKER

## 2019-09-26 DIAGNOSIS — F43.12 CHRONIC POST-TRAUMATIC STRESS DISORDER (PTSD): ICD-10-CM

## 2019-09-26 DIAGNOSIS — F31.32 BIPOLAR AFFECTIVE DISORDER, CURRENTLY DEPRESSED, MODERATE (H): ICD-10-CM

## 2019-09-26 DIAGNOSIS — G89.4 CHRONIC PAIN SYNDROME: ICD-10-CM

## 2019-09-26 DIAGNOSIS — F41.1 ANXIETY STATE: ICD-10-CM

## 2019-09-26 DIAGNOSIS — F41.1 GENERALIZED ANXIETY DISORDER: ICD-10-CM

## 2019-09-26 NOTE — TELEPHONE ENCOUNTER
RECORDS RECEIVED FROM: [facility]   DATE RECEIVED: 11/18/2019   NOTES (FOR ALL VISITS) STATUS DETAILS   OFFICE NOTES from referring provider Care Everywhere 09/16/2019   OFFICE NOTES from other specialist Care Everywhere 09/16/2019   ED NOTES N/A    OPERATIVE REPORT  (thyroid, pituitary, adrenal, parathyroid) N/A    MEDICATION LIST Care Everywhere LEVOTHYROXINE   IMAGING      DEXASCAN N/A    MRI (BRAIN) N/A    XR (Chest) N/A    CT (HEAD/NECK/CHEST/ABDOMEN) N/A    NUCLEAR  N/A    ULTRASOUND (HEAD/NECK) Received    LABS     DIABETES: HBGA1C, CREATININE, FASTING LIPIDS, MICROALBUMIN URINE, POTASSIUM, TSH, T4    THYROID: TSH, T4, CBC, THYRODLONULIN, TOTAL T3, FREE T4, CALCITONIN, CEA Care Everywhere   08/13/2019 08/09/2019 [id number]

## 2019-09-26 NOTE — TELEPHONE ENCOUNTER
Scheduled Graves disease Endocrine, however, Pt also has diabetes. Per protocol, no new diabetes Pts for Dr Coker, Pt recommended to see provider who sees for Graves and diabetes.   Joie Boyd RN on 9/26/2019 at 1:38 PM

## 2019-09-27 ENCOUNTER — TELEPHONE (OUTPATIENT)
Dept: ENDOCRINOLOGY | Facility: CLINIC | Age: 64
End: 2019-09-27

## 2019-09-29 ENCOUNTER — HEALTH MAINTENANCE LETTER (OUTPATIENT)
Age: 64
End: 2019-09-29

## 2019-09-30 ENCOUNTER — AMBULATORY - HEALTHEAST (OUTPATIENT)
Dept: PALLIATIVE MEDICINE | Facility: OTHER | Age: 64
End: 2019-09-30

## 2019-09-30 PROBLEM — I51.89 OTHER ILL-DEFINED HEART DISEASES: Status: ACTIVE | Noted: 2019-09-24

## 2019-10-02 ENCOUNTER — TELEPHONE (OUTPATIENT)
Dept: CARDIOLOGY | Facility: CLINIC | Age: 64
End: 2019-10-02

## 2019-10-02 ENCOUNTER — COMMUNICATION - HEALTHEAST (OUTPATIENT)
Dept: PALLIATIVE MEDICINE | Facility: OTHER | Age: 64
End: 2019-10-02

## 2019-10-02 DIAGNOSIS — G89.4 CHRONIC PAIN SYNDROME: ICD-10-CM

## 2019-10-02 DIAGNOSIS — M54.2 NECK PAIN: ICD-10-CM

## 2019-10-02 NOTE — TELEPHONE ENCOUNTER
Patient asked if there were any medications she shouldn't take prior to her procedure tomorrow. I verbalized that she should take all of her medications, but can hold diuretics the morning of the procedure for comfort. Patient also wanted to know where her  should park after work when he comes to pick her up. I suggested he go through  parking at the front of the hospital for ease. Patient verbalized understanding and didn't have any further questions. Bindu Walden RN on 10/2/2019 at 3:33 PM

## 2019-10-03 ENCOUNTER — APPOINTMENT (OUTPATIENT)
Dept: MEDSURG UNIT | Facility: CLINIC | Age: 64
End: 2019-10-03
Attending: INTERNAL MEDICINE
Payer: MEDICARE

## 2019-10-03 ENCOUNTER — APPOINTMENT (OUTPATIENT)
Dept: LAB | Facility: CLINIC | Age: 64
End: 2019-10-03
Attending: INTERNAL MEDICINE
Payer: MEDICARE

## 2019-10-03 ENCOUNTER — HOSPITAL ENCOUNTER (OUTPATIENT)
Facility: CLINIC | Age: 64
Discharge: HOME | End: 2019-10-03
Attending: INTERNAL MEDICINE | Admitting: INTERNAL MEDICINE
Payer: MEDICARE

## 2019-10-03 VITALS
HEART RATE: 93 BPM | SYSTOLIC BLOOD PRESSURE: 128 MMHG | TEMPERATURE: 97.5 F | RESPIRATION RATE: 20 BRPM | DIASTOLIC BLOOD PRESSURE: 74 MMHG | OXYGEN SATURATION: 96 %

## 2019-10-03 DIAGNOSIS — I27.20 PULMONARY HYPERTENSION (H): ICD-10-CM

## 2019-10-03 DIAGNOSIS — I20.89 STABLE ANGINA (H): ICD-10-CM

## 2019-10-03 DIAGNOSIS — R06.02 SOB (SHORTNESS OF BREATH): ICD-10-CM

## 2019-10-03 DIAGNOSIS — I51.89 OTHER ILL-DEFINED HEART DISEASES: ICD-10-CM

## 2019-10-03 DIAGNOSIS — R06.02 SOB (SHORTNESS OF BREATH): Primary | ICD-10-CM

## 2019-10-03 PROBLEM — Z98.890 STATUS POST CORONARY ANGIOGRAM: Status: ACTIVE | Noted: 2019-10-03

## 2019-10-03 LAB
ANION GAP SERPL CALCULATED.3IONS-SCNC: 11 MMOL/L (ref 3–14)
BUN SERPL-MCNC: 17 MG/DL (ref 7–30)
CALCIUM SERPL-MCNC: 9 MG/DL (ref 8.5–10.1)
CHLORIDE SERPL-SCNC: 100 MMOL/L (ref 94–109)
CO2 SERPL-SCNC: 29 MMOL/L (ref 20–32)
CREAT SERPL-MCNC: 0.68 MG/DL (ref 0.52–1.04)
CREAT SERPL-MCNC: 0.68 MG/DL (ref 0.52–1.04)
ERYTHROCYTE [DISTWIDTH] IN BLOOD BY AUTOMATED COUNT: 11.8 % (ref 10–15)
GFR ESTIMATE EXT - HISTORICAL: >90 ML/MIN/1.73M2
GFR SERPL CREATININE-BSD FRML MDRD: >90 ML/MIN/{1.73_M2}
GLUCOSE SERPL-MCNC: 110 MG/DL (ref 70–99)
HBA1C MFR BLD: 6 % (ref 0–5.6)
HBA1C MFR BLD: 6 % (ref 0–5.6)
HCT VFR BLD AUTO: 43.3 % (ref 35–47)
HGB BLD-MCNC: 14.6 G/DL (ref 11.7–15.7)
MCH RBC QN AUTO: 33.6 PG (ref 26.5–33)
MCHC RBC AUTO-ENTMCNC: 33.7 G/DL (ref 31.5–36.5)
MCV RBC AUTO: 100 FL (ref 78–100)
NT-PROBNP SERPL-MCNC: 96 PG/ML (ref 0–125)
PLATELET # BLD AUTO: 234 10E9/L (ref 150–450)
POTASSIUM SERPL-SCNC: 3.2 MMOL/L (ref 3.4–5.3)
RBC # BLD AUTO: 4.34 10E12/L (ref 3.8–5.2)
SODIUM SERPL-SCNC: 140 MMOL/L (ref 133–144)
WBC # BLD AUTO: 8.4 10E9/L (ref 4–11)

## 2019-10-03 PROCEDURE — 83880 ASSAY OF NATRIURETIC PEPTIDE: CPT | Performed by: INTERNAL MEDICINE

## 2019-10-03 PROCEDURE — 25000128 H RX IP 250 OP 636

## 2019-10-03 PROCEDURE — 25000128 H RX IP 250 OP 636: Performed by: INTERNAL MEDICINE

## 2019-10-03 PROCEDURE — 93010 ELECTROCARDIOGRAM REPORT: CPT | Performed by: INTERNAL MEDICINE

## 2019-10-03 PROCEDURE — 40000172 ZZH STATISTIC PROCEDURE PREP ONLY

## 2019-10-03 PROCEDURE — 80048 BASIC METABOLIC PNL TOTAL CA: CPT | Performed by: INTERNAL MEDICINE

## 2019-10-03 PROCEDURE — 99153 MOD SED SAME PHYS/QHP EA: CPT | Performed by: INTERNAL MEDICINE

## 2019-10-03 PROCEDURE — 85027 COMPLETE CBC AUTOMATED: CPT | Performed by: INTERNAL MEDICINE

## 2019-10-03 PROCEDURE — 25000132 ZZH RX MED GY IP 250 OP 250 PS 637: Mod: GY | Performed by: NURSE PRACTITIONER

## 2019-10-03 PROCEDURE — 99152 MOD SED SAME PHYS/QHP 5/>YRS: CPT | Performed by: INTERNAL MEDICINE

## 2019-10-03 PROCEDURE — 83036 HEMOGLOBIN GLYCOSYLATED A1C: CPT | Performed by: INTERNAL MEDICINE

## 2019-10-03 PROCEDURE — 27210794 ZZH OR GENERAL SUPPLY STERILE: Performed by: INTERNAL MEDICINE

## 2019-10-03 PROCEDURE — 40000065 ZZH STATISTIC EKG NON-CHARGEABLE

## 2019-10-03 PROCEDURE — 36415 COLL VENOUS BLD VENIPUNCTURE: CPT | Performed by: INTERNAL MEDICINE

## 2019-10-03 PROCEDURE — C1894 INTRO/SHEATH, NON-LASER: HCPCS | Performed by: INTERNAL MEDICINE

## 2019-10-03 PROCEDURE — 93456 R HRT CORONARY ARTERY ANGIO: CPT | Mod: 26 | Performed by: INTERNAL MEDICINE

## 2019-10-03 PROCEDURE — 93456 R HRT CORONARY ARTERY ANGIO: CPT | Performed by: INTERNAL MEDICINE

## 2019-10-03 PROCEDURE — 25000125 ZZHC RX 250: Performed by: INTERNAL MEDICINE

## 2019-10-03 PROCEDURE — 93005 ELECTROCARDIOGRAM TRACING: CPT

## 2019-10-03 RX ORDER — NITROGLYCERIN 20 MG/100ML
.07-1.69 INJECTION INTRAVENOUS CONTINUOUS PRN
Status: DISCONTINUED | OUTPATIENT
Start: 2019-10-03 | End: 2019-10-03 | Stop reason: HOSPADM

## 2019-10-03 RX ORDER — NOREPINEPHRINE BITARTRATE 0.06 MG/ML
.03-.4 INJECTION, SOLUTION INTRAVENOUS CONTINUOUS PRN
Status: DISCONTINUED | OUTPATIENT
Start: 2019-10-03 | End: 2019-10-03 | Stop reason: HOSPADM

## 2019-10-03 RX ORDER — LIDOCAINE 40 MG/G
CREAM TOPICAL
Status: DISCONTINUED | OUTPATIENT
Start: 2019-10-03 | End: 2019-10-03 | Stop reason: HOSPADM

## 2019-10-03 RX ORDER — FENTANYL CITRATE 50 UG/ML
INJECTION, SOLUTION INTRAMUSCULAR; INTRAVENOUS
Status: DISCONTINUED | OUTPATIENT
Start: 2019-10-03 | End: 2019-10-03 | Stop reason: HOSPADM

## 2019-10-03 RX ORDER — ARGATROBAN 1 MG/ML
150 INJECTION, SOLUTION INTRAVENOUS
Status: DISCONTINUED | OUTPATIENT
Start: 2019-10-03 | End: 2019-10-03 | Stop reason: HOSPADM

## 2019-10-03 RX ORDER — IOPAMIDOL 755 MG/ML
INJECTION, SOLUTION INTRAVASCULAR
Status: DISCONTINUED | OUTPATIENT
Start: 2019-10-03 | End: 2019-10-03 | Stop reason: HOSPADM

## 2019-10-03 RX ORDER — EPTIFIBATIDE 2 MG/ML
180 INJECTION, SOLUTION INTRAVENOUS EVERY 10 MIN PRN
Status: DISCONTINUED | OUTPATIENT
Start: 2019-10-03 | End: 2019-10-03 | Stop reason: HOSPADM

## 2019-10-03 RX ORDER — ARGATROBAN 1 MG/ML
350 INJECTION, SOLUTION INTRAVENOUS
Status: DISCONTINUED | OUTPATIENT
Start: 2019-10-03 | End: 2019-10-03 | Stop reason: HOSPADM

## 2019-10-03 RX ORDER — DOPAMINE HYDROCHLORIDE 160 MG/100ML
2-20 INJECTION, SOLUTION INTRAVENOUS CONTINUOUS PRN
Status: DISCONTINUED | OUTPATIENT
Start: 2019-10-03 | End: 2019-10-03 | Stop reason: HOSPADM

## 2019-10-03 RX ORDER — ATROPINE SULFATE 0.1 MG/ML
INJECTION INTRAVENOUS
Status: DISCONTINUED
Start: 2019-10-03 | End: 2019-10-03 | Stop reason: WASHOUT

## 2019-10-03 RX ORDER — FENTANYL CITRATE 50 UG/ML
INJECTION, SOLUTION INTRAMUSCULAR; INTRAVENOUS
Status: COMPLETED
Start: 2019-10-03 | End: 2019-10-03

## 2019-10-03 RX ORDER — ACETAMINOPHEN 325 MG/1
650 TABLET ORAL EVERY 4 HOURS PRN
Status: DISCONTINUED | OUTPATIENT
Start: 2019-10-03 | End: 2019-10-03 | Stop reason: HOSPADM

## 2019-10-03 RX ORDER — NALOXONE HYDROCHLORIDE 0.4 MG/ML
.1-.4 INJECTION, SOLUTION INTRAMUSCULAR; INTRAVENOUS; SUBCUTANEOUS
Status: DISCONTINUED | OUTPATIENT
Start: 2019-10-03 | End: 2019-10-03 | Stop reason: HOSPADM

## 2019-10-03 RX ORDER — FLUMAZENIL 0.1 MG/ML
0.2 INJECTION, SOLUTION INTRAVENOUS
Status: DISCONTINUED | OUTPATIENT
Start: 2019-10-03 | End: 2019-10-03 | Stop reason: HOSPADM

## 2019-10-03 RX ORDER — ATROPINE SULFATE 0.1 MG/ML
0.5 INJECTION INTRAVENOUS EVERY 5 MIN PRN
Status: DISCONTINUED | OUTPATIENT
Start: 2019-10-03 | End: 2019-10-03 | Stop reason: HOSPADM

## 2019-10-03 RX ORDER — DOBUTAMINE HYDROCHLORIDE 200 MG/100ML
2-20 INJECTION INTRAVENOUS CONTINUOUS PRN
Status: DISCONTINUED | OUTPATIENT
Start: 2019-10-03 | End: 2019-10-03 | Stop reason: HOSPADM

## 2019-10-03 RX ORDER — PHENYLEPHRINE HCL IN 0.9% NACL 50MG/250ML
.5-6 PLASTIC BAG, INJECTION (ML) INTRAVENOUS CONTINUOUS PRN
Status: DISCONTINUED | OUTPATIENT
Start: 2019-10-03 | End: 2019-10-03 | Stop reason: HOSPADM

## 2019-10-03 RX ORDER — POTASSIUM CHLORIDE 750 MG/1
40 TABLET, EXTENDED RELEASE ORAL ONCE
Status: COMPLETED | OUTPATIENT
Start: 2019-10-03 | End: 2019-10-03

## 2019-10-03 RX ORDER — TIROFIBAN HYDROCHLORIDE 50 UG/ML
0.07 INJECTION INTRAVENOUS CONTINUOUS PRN
Status: DISCONTINUED | OUTPATIENT
Start: 2019-10-03 | End: 2019-10-03 | Stop reason: HOSPADM

## 2019-10-03 RX ORDER — LIDOCAINE 40 MG/G
CREAM TOPICAL
Status: COMPLETED | OUTPATIENT
Start: 2019-10-03 | End: 2019-10-03

## 2019-10-03 RX ORDER — NALOXONE HYDROCHLORIDE 0.4 MG/ML
.2-.4 INJECTION, SOLUTION INTRAMUSCULAR; INTRAVENOUS; SUBCUTANEOUS
Status: DISCONTINUED | OUTPATIENT
Start: 2019-10-03 | End: 2019-10-03 | Stop reason: HOSPADM

## 2019-10-03 RX ORDER — FENTANYL CITRATE 50 UG/ML
25-50 INJECTION, SOLUTION INTRAMUSCULAR; INTRAVENOUS
Status: DISCONTINUED | OUTPATIENT
Start: 2019-10-03 | End: 2019-10-03 | Stop reason: HOSPADM

## 2019-10-03 RX ORDER — EPTIFIBATIDE 2 MG/ML
1 INJECTION, SOLUTION INTRAVENOUS CONTINUOUS PRN
Status: DISCONTINUED | OUTPATIENT
Start: 2019-10-03 | End: 2019-10-03 | Stop reason: HOSPADM

## 2019-10-03 RX ADMIN — LIDOCAINE: 40 CREAM TOPICAL at 13:04

## 2019-10-03 RX ADMIN — FENTANYL CITRATE 50 MCG: 50 INJECTION INTRAMUSCULAR; INTRAVENOUS at 15:57

## 2019-10-03 RX ADMIN — FENTANYL CITRATE 50 MCG: 50 INJECTION, SOLUTION INTRAMUSCULAR; INTRAVENOUS at 15:57

## 2019-10-03 RX ADMIN — POTASSIUM CHLORIDE 40 MEQ: 750 TABLET, EXTENDED RELEASE ORAL at 13:02

## 2019-10-03 NOTE — PRE-PROCEDURE
GENERAL PRE-PROCEDURE:   Procedure:  Coronary angiogram with possible percutaneous coronary intervention, right heart catheterization  Date/Time:  10/3/2019 1:02 PM    Verbal consent obtained?: Yes    Written consent obtained?: Yes    Risks and benefits: Risks, benefits and alternatives were discussed    DC Plan: Appropriate discharge home plan in place for patients who are going home after procedure   Consent given by:  Patient  Patient states understanding of procedure being performed: Yes    Patient's understanding of procedure matches consent: Yes    Procedure consent matches procedure scheduled: Yes    Expected level of sedation:  Moderate  Appropriately NPO:  Yes  ASA Class:  Class 3- Severe systemic disease, definite functional limitations  Mallampati  :  Grade 3- soft palate visible, posterior pharyngeal wall not visible  Lungs:  Lungs clear with good breath sounds bilaterally  Heart:  Normal heart sounds and rate  History & Physical reviewed:  History and physical reviewed and no updates needed  Statement of review:  I have reviewed the lab findings, diagnostic data, medications, and the plan for sedation    DION Wayne, CNP  [facility]

## 2019-10-03 NOTE — DISCHARGE INSTRUCTIONS
"  Going home after a Coronary Angiogram    PROCEDURE SITE:   Femoral (Groin)  It is normal to have soreness, mild bruising or a small lump at the puncture site. You may shower but please do not use a hot tub, bath tub or pool for 2 days. Do not apply any lotion or powder near the site for 2 days. For 2 days when you cough, sneeze or push with a bowel movement place your hand over the puncture site and apply gentle pressure. For the first 2 days avoid squatting. Avoid lifting more than 10 pounds for at least 5 days. Further activity restrictions as below. If you feel a \"pop\" with pain and/or notice significant increase in pain, swelling or bleeding from the site- immediately lie down, press firmly on the site and proceed to the nearest medical facility.    MEDICATIONS:   1. If you are on Metformin (Glucophage) or any medications that contain this, you should not take it for at least 48 hours (2 days) from the time of your procedure. If you have baseline kidney problems, you may be instructed to also have a lab test drawn in 2-3 days before getting the okay to restart it.  2. If you have not been continued on other medications you were previously taking at home it is probably for reason though please discuss your concerns with any of your doctors.     DIET:  We recommend a diet low in saturated fat, trans fat and cholesterol. In addition it will be helpful to be cautious of sodium intake and carbohydrates. Try to increase the amount of lean meats you eat like fish and chicken, but avoid frying; and reduce the amount of red meat you eat. Eat more fresh fruits and vegetables and try to avoid canned and processed food. Please reference the handouts you received for more specific information.    OTHER INFORMATION:  1. If you are a smoker, quitting smoking will be one of the most important things you can do for yourself. There are nicotine replacement options or medications they might be able to be prescribed. Please " discuss this with your doctors. Consider calling the [employer] at [phone] ([phone]) as they can offer ongoing support after discharge.    CALL YOUR DOCTOR IF:  -You have a large or growing lump/bump around the procedure site  -The site is red, swollen, hot, tender or has drainage  -You have hives, a rash or unusual itching  -You have increasing or worsening shortness of breath or chest pain        Should you need to contact us:  Cardiology clinic for scheduling or triage nurse questions/concerns:  [phone]

## 2019-10-03 NOTE — Clinical Note
dry, intact, no bleeding and no hematoma. 7fr sheath RFV 4fr RFA sheath, secured and covered with tegaderm

## 2019-10-03 NOTE — PROGRESS NOTES
Prep complete for Angiogram. K+ 3.2, 40 Meq po per MD orders. Pt took  mg po this am.  Sidney will be picking up patient post procedure.

## 2019-10-03 NOTE — PROGRESS NOTES
Right groin sheaths pulled by CHRISTINA Bray. Patient tolerated well. Hemostasis Achieved at 1610 Pedal pulses palpable. On bedrest till 1810. Patient verbalized understanding not move right leg. Will continue to monitor.

## 2019-10-04 ENCOUNTER — TELEPHONE (OUTPATIENT)
Dept: CARDIOLOGY | Facility: CLINIC | Age: 64
End: 2019-10-04

## 2019-10-04 NOTE — PROGRESS NOTES
Discharge patient after   1-patient is tolerating liquids and foods-yes  2- ambulating-yes  3- urinating-yes  4- puncture sites are stable ( no bleeding and no hematoma)-yes  5- and patient has a -yes  6-IF Vital Signs are within the patient's normal limits or systolic blood pressure greater than 90 mmHg and less than 160 mmHg-yes  7-Patient is alert and oriented-yes  8-If diabetic, blood glucose is in patient's usual normal range-NA    DC instructions given to pt, verbalized understanding.  All belongings with pt, IV DC'd and documented. Pt discharged.

## 2019-10-04 NOTE — TELEPHONE ENCOUNTER
Patient called and was inquiring about the results of her coronary angio and RHC. Patient was wondering when she should schedule for a follow up appointment. I let the patient know that Dr. Edwards will be reviewing the results after the weekend and will come up with a plan then, since Dr. Edwards is on service now. I told her that I will follow up late in the week next week.     Patient also was inquiring about a call she received about cardiac rehab. I told her the orders were placed yesterday, probably sometime after her procedures. I told her these rehab services are very beneficial and she should schedule and try out a few sessions. Patient verbalized understanding and stated she would give them a call back. Bindu Walden RN on 10/4/2019 at 2:17 PM

## 2019-10-05 LAB — INTERPRETATION ECG - MUSE: NORMAL

## 2019-10-07 ENCOUNTER — PRE VISIT (OUTPATIENT)
Dept: SLEEP MEDICINE | Facility: CLINIC | Age: 64
End: 2019-10-07

## 2019-10-07 NOTE — TELEPHONE ENCOUNTER
"  1.  Reason for the visit:  Discuss possible sleep apnea  2.  Referring provider and clinic name:  Dr. Vasquez [facility]  3.  Previous Sleep Doctor or Pulmonlogist (clinic name)?  None noted  4.  Records, Procedures, Imaging, and Labs (see below)  No records to obtain        All NOTES from previous office visits that pertain to why they are being seen in the Sleep Center    Previous Sleep Studies, Chest CT, Echos and reports that pertain to why they are seeing Sleep Center    All Sleep records that have been done in the last 2 years that pertain to why they are seeing Sleep Center            Are they being seen for continuation of care for Cpap/Bipap/Avap/Trilogy/Dental Device? none    If yes to above Who and Where was Device issued/currently getting supplies from? na    Are you currently on \"Supplemental Oxygen\" during the day or night?   na                                                                                                                                                      Please remind pt to bring Cpap machine and ask to arrive 15 minutes early to appointment due traffic and congestion                                                 5. Pt Sleep Center Packet received Message left asking pt to arrive 30 minutes early to appointment if no packet received.        Yes: \"please make sure that you bring this to your appointment completed, either the doctor will not see you until this completed or you may be asked to reschedule your appointment.\"     No: mail or email to the pt and explain, \"please make sure that you bring this to your appointment completed, either the doctor will not see you until this completed or you may be asked to reschedule your appointment.\"     ~If pt coming early to fill packet out, ask that they come 30 minutes prior to their appointment~     6. Has the pt's medication list been updated and preferred pharmacy added?     7. Has the allergy list been reviewed?    \"Thank " "you for choosing [facility] and we look forward to seeing you at your upcoming appointment\"     "

## 2019-10-08 ENCOUNTER — COMMUNICATION - HEALTHEAST (OUTPATIENT)
Dept: INTERNAL MEDICINE | Facility: CLINIC | Age: 64
End: 2019-10-08

## 2019-10-08 DIAGNOSIS — R73.01 IMPAIRED FASTING GLUCOSE: ICD-10-CM

## 2019-10-08 DIAGNOSIS — E89.0 POSTABLATIVE HYPOTHYROIDISM: ICD-10-CM

## 2019-10-08 DIAGNOSIS — E53.8 VITAMIN B12 DEFICIENCY: ICD-10-CM

## 2019-10-08 DIAGNOSIS — E55.9 VITAMIN D DEFICIENCY: ICD-10-CM

## 2019-10-10 ENCOUNTER — TELEPHONE (OUTPATIENT)
Dept: CARDIOLOGY | Facility: CLINIC | Age: 64
End: 2019-10-10

## 2019-10-10 NOTE — TELEPHONE ENCOUNTER
Spoke with patient and read her the results of her RHC/angio. I let her know that Dr. Edwards would like to follow up with her to decide a plan moving forward. Patient agreed to see him in [facility]. Follow up appointment scheduled for October 22 at 8AM. Bindu Walden RN on 10/11/2019 at 10:28 AM  ___________________________________________________________    Patient called to follow up on her RHC/angio results and the follow up plan. I let the patient know I will get in contact with Dr. Edwards tomorrow when he is on service at the hospital. I advised the patient that I will call either tomorrow or Monday with a follow up plan per Dr. Edwards. Patient verbalized understanding and did not have any further questions. Bindu Walden RN on 10/10/2019 at 11:51 AM

## 2019-10-11 ENCOUNTER — COMMUNICATION - HEALTHEAST (OUTPATIENT)
Dept: SCHEDULING | Facility: CLINIC | Age: 64
End: 2019-10-11

## 2019-10-11 DIAGNOSIS — J44.1 COPD EXACERBATION (H): ICD-10-CM

## 2019-10-12 NOTE — PROGRESS NOTES
Francisco J Edwards M.D.  Cardiovascular Medicine    I personally saw and examined this patient, discussed care with housestaff and other consultants, reviewed current laboratories and imaging studies, and conveyed impression and diagnostic/therapeutic plan to patient.    Loida Stockton MD    [address] [place] [address]    [phone] (Fax)          Reina Morillo MD    [address] [place], [place] [id number] [phone]     Problem List  1. Possible pulmonary hypertension  2. History of breast cancer              History of mastectomy              Breast reconstruction  3. Atherosclerosis with coronary calcifications  4. Hiatal hernia  5. Hepatic steatosis  6. Diverticulosis  7. KYAW with treatment  8. Psoriasis  9. Grave's disease with ablation  10. Hypertension  11. Asthma  12. Hemochromatosis,gene +  13. Bipolar disorder ?  14. Chronic pain with narcotic dependence  15. Pheochromocytoma (right surgically removed)  16. Elevated body mass index  17. Elevate hemoglobin with macrocytosis  18  Hiatal hernia  19. Chronic pain management    History    There is no interim history of increasing shortness of breath, orthopnea, PND, ankle edema, increasing abdominal girth, palpitation, pre-syncope, syncope, bleeding or thromboembolic complications.  The patient is taking medication regularly, following a low salt diet, and exercising regularly as outlined.    Objective  Constitutional: alert, oriented, normal gait and station, normal mentation.  Oral: moist mucous membrans  Lymph: without pathologic adenopathy  Chest: clear to ausculation and percussion  Cor: No evidence of left or right ventricular activity.  Rhythm is regular.  S1 normal, S2 split physiologically. Murmurs are not present  Abdomen: without tenderness, rebound, guarding, masses, ascites  Extremities: Edema not present  Neuro: no focal defects, normal mentation  Skin: without open lesions  Psych:  oriented, verbal, mental status in tact  Wt Readings from Last 24 Encounters:   10/22/19 120.2 kg (265 lb)   09/25/19 118.5 kg (261 lb 3.2 oz)   09/24/19 119 kg (262 lb 4.8 oz)   09/19/19 119.4 kg (263 lb 3.2 oz)   09/13/19 120.7 kg (266 lb 1.6 oz)   09/04/19 118.9 kg (262 lb 3.2 oz)   08/13/19 117.5 kg (259 lb)   06/29/15 92.1 kg (203 lb)       Meds  Current Outpatient Medications   Medication     albuterol (ACCUNEB) 0.63 MG/3ML neb solution     ASPIRIN PO     budesonide-formoterol (SYMBICORT) 160-4.5 MCG/ACT Inhaler     BUTRANS 10 MCG/HR WK patch     Cyanocobalamin (VITAMIN B-12) 5000 MCG SUBL     Desvenlafaxine Succinate (PRISTIQ PO)     diclofenac (VOLTAREN) 1 % topical gel     DULoxetine (CYMBALTA) 30 MG capsule     DULoxetine (CYMBALTA) 60 MG capsule     hydrOXYzine (VISTARIL) 25 MG capsule     Levothyroxine Sodium (SYNTHROID PO)     Omeprazole (PRILOSEC PO)     Pramipexole Dihydrochloride (MIRAPEX PO)     triamterene-HCTZ (MAXZIDE-25) 37.5-25 MG tablet     No current facility-administered medications for this visit.          Labs  Results for ERIK BOSS NICOLE (MRN [id number]) as of 10/12/2019 14:45   Ref. Range 9/24/2019 14:12 10/3/2019 11:41   Sodium Latest Ref Range: 133 - 144 mmol/L 136 140   Potassium Latest Ref Range: 3.4 - 5.3 mmol/L 3.3 (L) 3.2 (L)   Chloride Latest Ref Range: 94 - 109 mmol/L 99 100   Carbon Dioxide Latest Ref Range: 20 - 32 mmol/L 31 29   Urea Nitrogen Latest Ref Range: 7 - 30 mg/dL 15 17   Creatinine Latest Ref Range: 0.52 - 1.04 mg/dL 0.69 0.68   GFR Estimate Latest Ref Range: >60 mL/min/1.73_m2 >90 >90   GFR Estimate If Black Latest Ref Range: >60 mL/min/1.73_m2 >90 >90   Calcium Latest Ref Range: 8.5 - 10.1 mg/dL 9.1 9.0   Anion Gap Latest Ref Range: 3 - 14 mmol/L 6 11   Hemoglobin A1C Latest Ref Range: 0 - 5.6 %  6.0 (H)   Ferritin Latest Ref Range: 8 - 252 ng/mL 33    Iron Latest Ref Range: 35 - 180 ug/dL 92    Iron Binding Cap Latest Ref Range: 240 - 430 ug/dL 362    Iron  Saturation Index Latest Ref Range: 15 - 46 % 25    N-Terminal Pro Bnp Latest Ref Range: 0 - 125 pg/mL 41 96   Glucose Latest Ref Range: 70 - 99 mg/dL 118 (H) 110 (H)   WBC Latest Ref Range: 4.0 - 11.0 10e9/L 7.6 8.4   Hemoglobin Latest Ref Range: 11.7 - 15.7 g/dL 15.7 14.6   Hematocrit Latest Ref Range: 35.0 - 47.0 % 44.9 43.3   Platelet Count Latest Ref Range: 150 - 450 10e9/L 247 234   RBC Count Latest Ref Range: 3.8 - 5.2 10e12/L 4.52 4.34   MCV Latest Ref Range: 78 - 100 fl 99 100   MCH Latest Ref Range: 26.5 - 33.0 pg 34.7 (H) 33.6 (H)   MCHC Latest Ref Range: 31.5 - 36.5 g/dL 35.0 33.7   RDW Latest Ref Range: 10.0 - 15.0 % 11.9 11.8   Diff Method Unknown Automated Method    % Neutrophils Latest Units: % 77.5    % Lymphocytes Latest Units: % 13.9      Imaging   Right Heart Pressures     Phase: Baseline    RA  A-wave: 16 mmHg  V-wave: 16 mmHg  Mean: 15 mmHg   HR: 88 bpm  RV  Systolic: 44 mmHg  End Diastolic: 16 mmHg  HR: 88 bpm    PA  Systolic:41 mmHg  Diasotlic: 24 mmHg  Mean: 31 mmHg  HR: 88 bpm  PA Sat: 70.8%    PCW  A-wave: 18 mmHg  V-wave: 17 mmHg  Mean: 16 mmHg  HR: 91 bpm    Cardiac Output  CO Jeannie: 5.72 L/min  CI Jeannie: 2.56 L/min/m2  CO TD: Not performed  CI TD: Not performed     Vitals  BP: 168 mmHg / 74 mmHg  SpO2: 92 %  PA Stat: 70.8 %    Resistance Metric  PVR index: 5.86 QUIROZ/m2       Assessment/Plan     The patient has evidence of volume overload and poorly controlled blood pressure.      The hemodynamics are not consistent with pure pulmonary arterial hypertension.  With the elevated right atrial pressure and pulmonary capillary pressure and normal cardiac output her pulmonary vascular resistance ( an indicator of pulmonary arterial disease), I think her most appropriate treatment would be diuretic to lower intravascular volume status and better control of her blood pressure which is consistently high here.  Therefore, I would restart lisinopril 5mgs once daily and recheck basic metabolic panel in  3-4 weeks to assess potassium in view of the stopping maxizide and starting furosemide 40 mgs once daily.    I would then increase lisinopril to better control blood pressure.     1. Stop maxzide  2. Restart lisinopril 5mgs  3. Start furosemide 40mgs once daily  4. Start potassium 20meq, take two tablets today and then one daily  5. See Dr. Stockton in two weeks for reassessment of blood pressure and basic metabolic panel to assess kidney and potassium and adjust diuretics and potassium to facilitate continued weight loss.    6. Return to see us in 4 months  7. Patient requests referral to pulmonary in [place]    CC: physicians above and    Dusty Dubois M.D.  ([facility]

## 2019-10-14 ENCOUNTER — AMBULATORY - HEALTHEAST (OUTPATIENT)
Dept: LAB | Facility: CLINIC | Age: 64
End: 2019-10-14

## 2019-10-14 ENCOUNTER — PRE VISIT (OUTPATIENT)
Dept: ONCOLOGY | Facility: CLINIC | Age: 64
End: 2019-10-14

## 2019-10-14 DIAGNOSIS — E53.8 VITAMIN B12 DEFICIENCY: ICD-10-CM

## 2019-10-14 DIAGNOSIS — R73.01 IMPAIRED FASTING GLUCOSE: ICD-10-CM

## 2019-10-14 DIAGNOSIS — E89.0 POSTABLATIVE HYPOTHYROIDISM: ICD-10-CM

## 2019-10-14 DIAGNOSIS — E55.9 VITAMIN D DEFICIENCY: ICD-10-CM

## 2019-10-14 LAB
ANION GAP SERPL CALCULATED.3IONS-SCNC: 17 MMOL/L (ref 5–18)
BUN SERPL-MCNC: 21 MG/DL (ref 8–22)
CALCIUM SERPL-MCNC: 9.9 MG/DL (ref 8.5–10.5)
CHLORIDE BLD-SCNC: 98 MMOL/L (ref 98–107)
CO2 SERPL-SCNC: 26 MMOL/L (ref 22–31)
CREAT SERPL-MCNC: 1.05 MG/DL (ref 0.6–1.1)
FOLATE SERPL-MCNC: 10.8 NG/ML
GFR SERPL CREATININE-BSD FRML MDRD: 52 ML/MIN/1.73M2
GLUCOSE BLD-MCNC: 113 MG/DL (ref 70–125)
POTASSIUM BLD-SCNC: 3.8 MMOL/L (ref 3.5–5)
SODIUM SERPL-SCNC: 141 MMOL/L (ref 136–145)
TSH SERPL DL<=0.005 MIU/L-ACNC: 2.74 UIU/ML (ref 0.3–5)
VIT B12 SERPL-MCNC: >2000 PG/ML (ref 213–816)

## 2019-10-15 ENCOUNTER — COMMUNICATION - HEALTHEAST (OUTPATIENT)
Dept: PALLIATIVE MEDICINE | Facility: OTHER | Age: 64
End: 2019-10-15

## 2019-10-15 ENCOUNTER — HOSPITAL ENCOUNTER (OUTPATIENT)
Dept: PALLIATIVE MEDICINE | Facility: OTHER | Age: 64
Discharge: HOME | End: 2019-10-15
Attending: ANESTHESIOLOGY

## 2019-10-15 ENCOUNTER — HOSPITAL ENCOUNTER (OUTPATIENT)
Dept: PALLIATIVE MEDICINE | Facility: OTHER | Age: 64
Discharge: HOME | End: 2019-10-15
Attending: SOCIAL WORKER

## 2019-10-15 DIAGNOSIS — F43.12 CHRONIC POST-TRAUMATIC STRESS DISORDER (PTSD): ICD-10-CM

## 2019-10-15 DIAGNOSIS — F31.32 BIPOLAR AFFECTIVE DISORDER, CURRENTLY DEPRESSED, MODERATE (H): ICD-10-CM

## 2019-10-15 DIAGNOSIS — F41.1 ANXIETY STATE: ICD-10-CM

## 2019-10-15 DIAGNOSIS — G89.4 CHRONIC PAIN SYNDROME: ICD-10-CM

## 2019-10-15 DIAGNOSIS — F41.1 GENERALIZED ANXIETY DISORDER: ICD-10-CM

## 2019-10-15 DIAGNOSIS — M54.2 NECK PAIN: ICD-10-CM

## 2019-10-15 LAB
25(OH)D3 SERPL-MCNC: 66 NG/ML (ref 30–80)
25(OH)D3 SERPL-MCNC: 66 NG/ML (ref 30–80)

## 2019-10-15 ASSESSMENT — MIFFLIN-ST. JEOR: SCORE: 1744.71

## 2019-10-16 ENCOUNTER — OFFICE VISIT - HEALTHEAST (OUTPATIENT)
Dept: INTERNAL MEDICINE | Facility: CLINIC | Age: 64
End: 2019-10-16

## 2019-10-16 DIAGNOSIS — I27.20 PULMONARY HYPERTENSION (H): ICD-10-CM

## 2019-10-16 DIAGNOSIS — E53.8 VITAMIN B12 DEFICIENCY: ICD-10-CM

## 2019-10-16 DIAGNOSIS — Z85.3 HISTORY OF BREAST CANCER: ICD-10-CM

## 2019-10-16 DIAGNOSIS — I15.2 HYPERTENSION DUE TO ENDOCRINE DISORDER: ICD-10-CM

## 2019-10-16 DIAGNOSIS — Z79.891 OPIOID USE AGREEMENT EXISTS: ICD-10-CM

## 2019-10-16 DIAGNOSIS — J44.1 CHRONIC OBSTRUCTIVE PULMONARY DISEASE WITH ACUTE EXACERBATION (H): ICD-10-CM

## 2019-10-16 DIAGNOSIS — E66.01 MORBID OBESITY (H): ICD-10-CM

## 2019-10-17 ENCOUNTER — OFFICE VISIT - HEALTHEAST (OUTPATIENT)
Dept: PALLIATIVE MEDICINE | Facility: OTHER | Age: 64
End: 2019-10-17

## 2019-10-17 DIAGNOSIS — L40.50 PSORIATIC ARTHROPATHY (H): ICD-10-CM

## 2019-10-22 ENCOUNTER — OFFICE VISIT (OUTPATIENT)
Dept: CARDIOLOGY | Facility: CLINIC | Age: 64
End: 2019-10-22
Payer: MEDICARE

## 2019-10-22 ENCOUNTER — RECORDS - HEALTHEAST (OUTPATIENT)
Dept: ADMINISTRATIVE | Facility: OTHER | Age: 64
End: 2019-10-22

## 2019-10-22 VITALS
HEART RATE: 90 BPM | SYSTOLIC BLOOD PRESSURE: 140 MMHG | DIASTOLIC BLOOD PRESSURE: 91 MMHG | WEIGHT: 265 LBS | BODY MASS INDEX: 42.59 KG/M2 | HEIGHT: 66 IN

## 2019-10-22 DIAGNOSIS — I27.20 PULMONARY HYPERTENSION (H): ICD-10-CM

## 2019-10-22 DIAGNOSIS — R06.02 SOB (SHORTNESS OF BREATH): ICD-10-CM

## 2019-10-22 DIAGNOSIS — G47.33 OSA (OBSTRUCTIVE SLEEP APNEA): Primary | ICD-10-CM

## 2019-10-22 PROCEDURE — 99214 OFFICE O/P EST MOD 30 MIN: CPT | Performed by: INTERNAL MEDICINE

## 2019-10-22 RX ORDER — HYDROCODONE BITARTRATE AND ACETAMINOPHEN 5; 325 MG/1; MG/1
1 TABLET ORAL 2 TIMES DAILY
COMMUNITY
End: 2019-11-18

## 2019-10-22 RX ORDER — FUROSEMIDE 20 MG
40 TABLET ORAL DAILY
Qty: 180 TABLET | Refills: 3 | Status: SHIPPED | OUTPATIENT
Start: 2019-10-22 | End: 2019-10-22

## 2019-10-22 RX ORDER — LISINOPRIL 5 MG/1
5 TABLET ORAL DAILY
Qty: 90 TABLET | Refills: 3 | Status: SHIPPED | OUTPATIENT
Start: 2019-10-22 | End: 2019-10-31 | Stop reason: ALTCHOICE

## 2019-10-22 RX ORDER — LISINOPRIL 5 MG/1
5 TABLET ORAL DAILY
Qty: 90 TABLET | Refills: 3 | Status: SHIPPED | OUTPATIENT
Start: 2019-10-22 | End: 2019-10-22

## 2019-10-22 RX ORDER — POTASSIUM CHLORIDE 1500 MG/1
20 TABLET, EXTENDED RELEASE ORAL DAILY
Qty: 91 TABLET | Refills: 3 | Status: SHIPPED | OUTPATIENT
Start: 2019-10-22 | End: 2020-03-12

## 2019-10-22 RX ORDER — MELATONIN 10 MG
1 TABLET, SUBLINGUAL SUBLINGUAL DAILY
COMMUNITY
End: 2020-02-14

## 2019-10-22 RX ORDER — POTASSIUM CHLORIDE 1500 MG/1
20 TABLET, EXTENDED RELEASE ORAL DAILY
Qty: 91 TABLET | Refills: 3 | Status: SHIPPED | OUTPATIENT
Start: 2019-10-22 | End: 2019-10-22

## 2019-10-22 RX ORDER — FUROSEMIDE 20 MG
40 TABLET ORAL DAILY
Qty: 180 TABLET | Refills: 3 | Status: SHIPPED | OUTPATIENT
Start: 2019-10-22 | End: 2020-11-24

## 2019-10-22 RX ORDER — PREDNISONE 20 MG/1
20 TABLET ORAL DAILY
COMMUNITY
End: 2019-11-18

## 2019-10-22 ASSESSMENT — MIFFLIN-ST. JEOR: SCORE: 1758.78

## 2019-10-22 NOTE — PATIENT INSTRUCTIONS
Medication Changes and Follow up Instructions:  1. Stop maxzide  2. Restart lisinopril 5mgs  3. Start furosemide 40mgs once daily  4. Start potassium 20meq, take two tablets today and then one daily  5. See Dr. Stockton in two weeks for reassessment of blood pressure and basic metabolic panel to assess kidney and potassium and adjust diuretics and potassium to facilitate continued weight loss.    6. Return to see us in 4 months  7. Patient requests referral to pulmonary in [place]      Patient Instructions:  1. Continue staying active and eat a heart healthy diet.    2. Please keep current list of medications with you at all times.    3. Remember to weigh yourself daily after voiding and before you consume any food or beverages and log the numbers.  If you have gained 2 pounds overnight or 5 pounds in a week contact us immediately for medication adjustments or further instructions.    4. **Please call us immediately if you have any syncope (fainting or passing out), chest pain, edema (swelling or weight gain), or decline in your functional status (general decline in how you are feeling).      Results:  At this appointment we reviewed your Right heart catheterization and Angiogram     For scheduling at [facility] please call [phone]  For scheduling at the [facility] please call [phone]  We are located on the second floor [address] at the [facility].  Our address is     [facility] [address],   [address]  [place]  [address]    Thank you for allowing us to be a part of your care here at the [facility]    If you have questions or concerns please contact us at:    Guille Walden, RN, BSN      Nurse Coordinator       Pulmonary Hypertension     [facility]   (Phone)[phone]  (Fax)[phone]    ** Please note that you will NOT receive a reminder call regarding your scheduled testing, reminder calls are for provider appointments only.  If you are  scheduled for testing within the [employer] system you may receive a call regarding pre-registration for billing purposes only.**     Remember to weigh yourself daily after voiding and before you consume any food or beverages and log the numbers.  If you have gained/lost 2 pounds overnight or 5 pounds in a week contact us immediately for medication adjustments or further instructions.    Support Group:  Pulmonary Hypertension Association  Https://www.phassociation.org/  **Look at the Events Tab** They even have Support Groups that you can call into    [facility] PH Support Group  Second Saturday of the Month from 1-3 PM   Location: [address] [place] [address]  Leader: Corry Harvey  Phone: [phone] or [phone]  Email: [email or web address]

## 2019-10-22 NOTE — LETTER
10/22/2019    Loida Stockton MD  [facility] [address] [facility] [address]    RE: Randi Cleary       Dear Colleague,    I had the pleasure of seeing Randi Cleary in the [facility].    Francisco J Edwards M.D.  Cardiovascular Medicine    I personally saw and examined this patient, discussed care with housestaff and other consultants, reviewed current laboratories and imaging studies, and conveyed impression and diagnostic/therapeutic plan to patient.    Loida Stockton MD    [address] [place] [address]    [phone] (Fax)          Reina Morillo MD    [address] [place] [id number] [phone]     Problem List  1. Possible pulmonary hypertension  2. History of breast cancer              History of mastectomy              Breast reconstruction  3. Atherosclerosis with coronary calcifications  4. Hiatal hernia  5. Hepatic steatosis  6. Diverticulosis  7. KYAW with treatment  8. Psoriasis  9. Grave's disease with ablation  10. Hypertension  11. Asthma  12. Hemochromatosis,gene +  13. Bipolar disorder ?  14. Chronic pain with narcotic dependence  15. Pheochromocytoma (right surgically removed)  16. Elevated body mass index  17. Elevate hemoglobin with macrocytosis  18  Hiatal hernia  19. Chronic pain management    History    There is no interim history of increasing shortness of breath, orthopnea, PND, ankle edema, increasing abdominal girth, palpitation, pre-syncope, syncope, bleeding or thromboembolic complications.  The patient is taking medication regularly, following a low salt diet, and exercising regularly as outlined.    Objective  Constitutional: alert, oriented, normal gait and station, normal mentation.  Oral: moist mucous membrans  Lymph: without pathologic adenopathy  Chest: clear to ausculation and percussion  Cor: No evidence of left or right ventricular activity.  Rhythm  is regular.  S1 normal, S2 split physiologically. Murmurs are not present  Abdomen: without tenderness, rebound, guarding, masses, ascites  Extremities: Edema not present  Neuro: no focal defects, normal mentation  Skin: without open lesions  Psych: oriented, verbal, mental status in tact  Wt Readings from Last 24 Encounters:   10/22/19 120.2 kg (265 lb)   09/25/19 118.5 kg (261 lb 3.2 oz)   09/24/19 119 kg (262 lb 4.8 oz)   09/19/19 119.4 kg (263 lb 3.2 oz)   09/13/19 120.7 kg (266 lb 1.6 oz)   09/04/19 118.9 kg (262 lb 3.2 oz)   08/13/19 117.5 kg (259 lb)   06/29/15 92.1 kg (203 lb)       Meds  Current Outpatient Medications   Medication     albuterol (ACCUNEB) 0.63 MG/3ML neb solution     ASPIRIN PO     budesonide-formoterol (SYMBICORT) 160-4.5 MCG/ACT Inhaler     BUTRANS 10 MCG/HR WK patch     Cyanocobalamin (VITAMIN B-12) 5000 MCG SUBL     Desvenlafaxine Succinate (PRISTIQ PO)     diclofenac (VOLTAREN) 1 % topical gel     DULoxetine (CYMBALTA) 30 MG capsule     DULoxetine (CYMBALTA) 60 MG capsule     hydrOXYzine (VISTARIL) 25 MG capsule     Levothyroxine Sodium (SYNTHROID PO)     Omeprazole (PRILOSEC PO)     Pramipexole Dihydrochloride (MIRAPEX PO)     triamterene-HCTZ (MAXZIDE-25) 37.5-25 MG tablet     No current facility-administered medications for this visit.          Labs  Results for ERIK BOSS (MRN [id number]) as of 10/12/2019 14:45   Ref. Range 9/24/2019 14:12 10/3/2019 11:41   Sodium Latest Ref Range: 133 - 144 mmol/L 136 140   Potassium Latest Ref Range: 3.4 - 5.3 mmol/L 3.3 (L) 3.2 (L)   Chloride Latest Ref Range: 94 - 109 mmol/L 99 100   Carbon Dioxide Latest Ref Range: 20 - 32 mmol/L 31 29   Urea Nitrogen Latest Ref Range: 7 - 30 mg/dL 15 17   Creatinine Latest Ref Range: 0.52 - 1.04 mg/dL 0.69 0.68   GFR Estimate Latest Ref Range: >60 mL/min/1.73_m2 >90 >90   GFR Estimate If Black Latest Ref Range: >60 mL/min/1.73_m2 >90 >90   Calcium Latest Ref Range: 8.5 - 10.1 mg/dL 9.1 9.0   Anion Gap  Latest Ref Range: 3 - 14 mmol/L 6 11   Hemoglobin A1C Latest Ref Range: 0 - 5.6 %  6.0 (H)   Ferritin Latest Ref Range: 8 - 252 ng/mL 33    Iron Latest Ref Range: 35 - 180 ug/dL 92    Iron Binding Cap Latest Ref Range: 240 - 430 ug/dL 362    Iron Saturation Index Latest Ref Range: 15 - 46 % 25    N-Terminal Pro Bnp Latest Ref Range: 0 - 125 pg/mL 41 96   Glucose Latest Ref Range: 70 - 99 mg/dL 118 (H) 110 (H)   WBC Latest Ref Range: 4.0 - 11.0 10e9/L 7.6 8.4   Hemoglobin Latest Ref Range: 11.7 - 15.7 g/dL 15.7 14.6   Hematocrit Latest Ref Range: 35.0 - 47.0 % 44.9 43.3   Platelet Count Latest Ref Range: 150 - 450 10e9/L 247 234   RBC Count Latest Ref Range: 3.8 - 5.2 10e12/L 4.52 4.34   MCV Latest Ref Range: 78 - 100 fl 99 100   MCH Latest Ref Range: 26.5 - 33.0 pg 34.7 (H) 33.6 (H)   MCHC Latest Ref Range: 31.5 - 36.5 g/dL 35.0 33.7   RDW Latest Ref Range: 10.0 - 15.0 % 11.9 11.8   Diff Method Unknown Automated Method    % Neutrophils Latest Units: % 77.5    % Lymphocytes Latest Units: % 13.9      Imaging   Right Heart Pressures     Phase: Baseline    RA  A-wave: 16 mmHg  V-wave: 16 mmHg  Mean: 15 mmHg   HR: 88 bpm  RV  Systolic: 44 mmHg  End Diastolic: 16 mmHg  HR: 88 bpm    PA  Systolic:41 mmHg  Diasotlic: 24 mmHg  Mean: 31 mmHg  HR: 88 bpm  PA Sat: 70.8%    PCW  A-wave: 18 mmHg  V-wave: 17 mmHg  Mean: 16 mmHg  HR: 91 bpm    Cardiac Output  CO Jeannie: 5.72 L/min  CI Jeannie: 2.56 L/min/m2  CO TD: Not performed  CI TD: Not performed     Vitals  BP: 168 mmHg / 74 mmHg  SpO2: 92 %  PA Stat: 70.8 %    Resistance Metric  PVR index: 5.86 QUIROZ/m2       Assessment/Plan     The patient has evidence of volume overload and poorly controlled blood pressure.      The hemodynamics are not consistent with pure pulmonary arterial hypertension.  With the elevated right atrial pressure and pulmonary capillary pressure and normal cardiac output her pulmonary vascular resistance ( an indicator of pulmonary arterial disease), I think her  most appropriate treatment would be diuretic to lower intravascular volume status and better control of her blood pressure which is consistently high here.  Therefore, I would restart lisinopril 5mgs once daily and recheck basic metabolic panel in 3-4 weeks to assess potassium in view of the stopping maxizide and starting furosemide 40 mgs once daily.    I would then increase lisinopril to better control blood pressure.     1. Stop maxzide  2. Restart lisinopril 5mgs  3. Start furosemide 40mgs once daily  4. Start potassium 20meq, take two tablets today and then one daily  5. See Dr. Stockton in two weeks for reassessment of blood pressure and basic metabolic panel to assess kidney and potassium and adjust diuretics and potassium to facilitate continued weight loss.    6. Return to see us in 4 months  7. Patient requests referral to pulmonary in [place]    CC: physicians above and    Dusty Dubois M.D.  ([facility]        Thank you for allowing me to participate in the care of your patient.    Sincerely,     Francisco J Edwards MD     [facility]

## 2019-10-22 NOTE — NURSING NOTE
Med Reconcile: Reviewed and verified all current medications with the patient. The updated medication list was printed and given to the patient.  New Medication: Patient was educated regarding newly prescribed medication, including discussion of  the indication, administration, side effects, and when to report to MD or RN. Patient demonstrated understanding of this information and agreed to call with further questions or concerns.  Medication Change: Patient was educated regarding prescribed medication change, including discussion of the indication, administration, side effects, and when to report to MD or RN. Patient demonstrated understanding of this information and agreed to call with further questions or concerns.  Diet: Patient instructed regarding a heart healthy diet, including discussion of reduced fat and sodium intake. Patient demonstrated understanding of this information and agreed to call with further questions or concerns.  Return Appointment: Patient given instructions regarding scheduling next clinic visit. Patient demonstrated understanding of this information and agreed to call with further questions or concerns.  Patient stated she understood all health information given and agreed to call with further questions or concerns.     Medication Changes and Follow up Instructions:  1. Stop maxzide  2. Restart lisinopril 5mgs  3. Start furosemide 40mgs once daily  4. Start potassium 20meq, take two tablets today and then one daily  5. See Dr. Stockton in two weeks for reassessment of blood pressure and basic metabolic panel to assess kidney and potassium and adjust diuretics and potassium to facilitate continued weight loss.    6. Return to see us in 4 months  7. Patient requests referral to pulmonary in [place]      Patient Instructions:  1. Continue staying active and eat a heart healthy diet.    2. Please keep current list of medications with you at all times.    3. Remember to weigh yourself daily  after voiding and before you consume any food or beverages and log the numbers.  If you have gained 2 pounds overnight or 5 pounds in a week contact us immediately for medication adjustments or further instructions.    4. **Please call us immediately if you have any syncope (fainting or passing out), chest pain, edema (swelling or weight gain), or decline in your functional status (general decline in how you are feeling).      Results:  At this appointment we reviewed your Right heart catheterization and Angiogram

## 2019-10-30 ENCOUNTER — TELEPHONE (OUTPATIENT)
Dept: CARDIOLOGY | Facility: CLINIC | Age: 64
End: 2019-10-30

## 2019-10-30 NOTE — TELEPHONE ENCOUNTER
"Spoke with patient with regard to concerns about her lisinopril prescription. Patient states she has had increased coughing and \"a tickle\" in her throat after starting it and would like to know if there is an alternative she can take instead. I stated I would follow up with Dr. Edwards and call her tomorrow. She also was concerned that she is retaining a lot of fluid and noticed a 5 lb increase in weight today. I advised that she take an extra Lasix 20mg today and tomorrow and to schedule an appointment with her PCP this Friday or early next week. Patient verbalized understanding and will call with date of her f/u appointment. Bindu Walden RN on 10/30/2019 at 1:53 PM    "

## 2019-10-31 ENCOUNTER — HOSPITAL ENCOUNTER (OUTPATIENT)
Dept: PALLIATIVE MEDICINE | Facility: OTHER | Age: 64
Discharge: HOME | End: 2019-10-31
Attending: SOCIAL WORKER

## 2019-10-31 ENCOUNTER — HOSPITAL ENCOUNTER (OUTPATIENT)
Dept: PALLIATIVE MEDICINE | Facility: OTHER | Age: 64
Discharge: HOME | End: 2019-10-31
Attending: NURSE PRACTITIONER

## 2019-10-31 DIAGNOSIS — G89.4 CHRONIC PAIN SYNDROME: ICD-10-CM

## 2019-10-31 DIAGNOSIS — I27.20 PULMONARY HYPERTENSION (H): ICD-10-CM

## 2019-10-31 DIAGNOSIS — E78.5 DYSLIPIDEMIA: Primary | ICD-10-CM

## 2019-10-31 DIAGNOSIS — F43.12 CHRONIC POST-TRAUMATIC STRESS DISORDER (PTSD): ICD-10-CM

## 2019-10-31 DIAGNOSIS — I27.0 PRIMARY PULMONARY HYPERTENSION (H): Primary | ICD-10-CM

## 2019-10-31 DIAGNOSIS — M54.2 NECK PAIN: ICD-10-CM

## 2019-10-31 DIAGNOSIS — F31.32 BIPOLAR AFFECTIVE DISORDER, CURRENTLY DEPRESSED, MODERATE (H): ICD-10-CM

## 2019-10-31 DIAGNOSIS — F41.1 GENERALIZED ANXIETY DISORDER: ICD-10-CM

## 2019-10-31 DIAGNOSIS — R06.02 SOB (SHORTNESS OF BREATH): ICD-10-CM

## 2019-10-31 RX ORDER — LOSARTAN POTASSIUM 25 MG/1
25 TABLET ORAL DAILY
Qty: 30 TABLET | Refills: 1 | Status: SHIPPED | OUTPATIENT
Start: 2019-10-31 | End: 2019-12-23

## 2019-10-31 ASSESSMENT — MIFFLIN-ST. JEOR: SCORE: 1766.48

## 2019-10-31 NOTE — PROGRESS NOTES
Date: 10/31/2019    Time of Call: 9:25 AM     Diagnosis:  Hypertension     [ [facility]OR[facility] ] Ordering provider: Dr. Francisco J Edwards  Order: losartan (Cozaar) 25mg daily     Order received by: Guille Walden RN     Follow-up/additional notes: stopped lisinopril d/t increased coughing and started on losartan

## 2019-11-01 ENCOUNTER — AMBULATORY - HEALTHEAST (OUTPATIENT)
Dept: PALLIATIVE MEDICINE | Facility: OTHER | Age: 64
End: 2019-11-01

## 2019-11-01 ENCOUNTER — COMMUNICATION - HEALTHEAST (OUTPATIENT)
Dept: INTERNAL MEDICINE | Facility: CLINIC | Age: 64
End: 2019-11-01

## 2019-11-01 DIAGNOSIS — J44.1 CHRONIC OBSTRUCTIVE PULMONARY DISEASE WITH ACUTE EXACERBATION (H): ICD-10-CM

## 2019-11-04 ENCOUNTER — COMMUNICATION - HEALTHEAST (OUTPATIENT)
Dept: PALLIATIVE MEDICINE | Facility: OTHER | Age: 64
End: 2019-11-04

## 2019-11-04 ENCOUNTER — AMBULATORY - HEALTHEAST (OUTPATIENT)
Dept: LAB | Facility: CLINIC | Age: 64
End: 2019-11-04

## 2019-11-04 ENCOUNTER — COMMUNICATION - HEALTHEAST (OUTPATIENT)
Dept: INTERNAL MEDICINE | Facility: CLINIC | Age: 64
End: 2019-11-04

## 2019-11-04 DIAGNOSIS — I27.0 PRIMARY PULMONARY HYPERTENSION (H): ICD-10-CM

## 2019-11-04 DIAGNOSIS — L40.50 PSORIATIC ARTHROPATHY (H): ICD-10-CM

## 2019-11-04 DIAGNOSIS — R06.02 SOB (SHORTNESS OF BREATH): ICD-10-CM

## 2019-11-05 ENCOUNTER — COMMUNICATION - HEALTHEAST (OUTPATIENT)
Dept: PALLIATIVE MEDICINE | Facility: OTHER | Age: 64
End: 2019-11-05

## 2019-11-05 ENCOUNTER — TELEPHONE (OUTPATIENT)
Dept: CARDIOLOGY | Facility: CLINIC | Age: 64
End: 2019-11-05

## 2019-11-05 ENCOUNTER — COMMUNICATION - HEALTHEAST (OUTPATIENT)
Dept: LAB | Facility: CLINIC | Age: 64
End: 2019-11-05

## 2019-11-05 ENCOUNTER — AMBULATORY - HEALTHEAST (OUTPATIENT)
Dept: LAB | Facility: CLINIC | Age: 64
End: 2019-11-05

## 2019-11-05 DIAGNOSIS — R06.02 SOB (SHORTNESS OF BREATH): ICD-10-CM

## 2019-11-05 DIAGNOSIS — I27.0 PRIMARY PULMONARY HYPERTENSION (H): ICD-10-CM

## 2019-11-05 LAB
ANION GAP SERPL CALCULATED.3IONS-SCNC: 11 MMOL/L (ref 5–18)
BUN SERPL-MCNC: 13 MG/DL (ref 8–22)
CALCIUM SERPL-MCNC: 9.3 MG/DL (ref 8.5–10.5)
CHLORIDE BLD-SCNC: 99 MMOL/L (ref 98–107)
CO2 SERPL-SCNC: 30 MMOL/L (ref 22–31)
CREAT SERPL-MCNC: 0.73 MG/DL (ref 0.6–1.1)
GFR SERPL CREATININE-BSD FRML MDRD: >60 ML/MIN/1.73M2
GLUCOSE BLD-MCNC: 131 MG/DL (ref 70–125)
POTASSIUM BLD-SCNC: 3.5 MMOL/L (ref 3.5–5)
SODIUM SERPL-SCNC: 140 MMOL/L (ref 136–145)

## 2019-11-05 NOTE — TELEPHONE ENCOUNTER
Spoke with patient to verify that she should have BMP drawn today. I confirmed and stated that I will look out for the results and will call with a follow up plan tomorrow. Patient verbalized understanding and didn't have any further questions. Bindu Walden RN on 11/5/2019 at 2:19 PM

## 2019-11-14 ENCOUNTER — HOSPITAL ENCOUNTER (OUTPATIENT)
Dept: PALLIATIVE MEDICINE | Facility: OTHER | Age: 64
Discharge: HOME | End: 2019-11-14
Attending: SOCIAL WORKER

## 2019-11-14 DIAGNOSIS — G89.4 CHRONIC PAIN SYNDROME: ICD-10-CM

## 2019-11-14 DIAGNOSIS — F31.32 BIPOLAR AFFECTIVE DISORDER, CURRENTLY DEPRESSED, MODERATE (H): ICD-10-CM

## 2019-11-14 DIAGNOSIS — F43.12 CHRONIC POST-TRAUMATIC STRESS DISORDER (PTSD): ICD-10-CM

## 2019-11-14 DIAGNOSIS — F41.1 GENERALIZED ANXIETY DISORDER: ICD-10-CM

## 2019-11-15 ASSESSMENT — ENCOUNTER SYMPTOMS
DECREASED APPETITE: 0
CHILLS: 0
DIFFICULTY URINATING: 1
ARTHRALGIAS: 1
BLOATING: 1
DOUBLE VISION: 0
EYE REDNESS: 1
HEARTBURN: 1
LOSS OF CONSCIOUSNESS: 0
LEG PAIN: 1
MYALGIAS: 1
WEAKNESS: 1
JOINT SWELLING: 0
FATIGUE: 1
MUSCLE WEAKNESS: 1
NIGHT SWEATS: 0
PANIC: 1
DISTURBANCES IN COORDINATION: 0
WEIGHT LOSS: 0
DYSPNEA ON EXERTION: 1
JAUNDICE: 0
HYPERTENSION: 0
NUMBNESS: 1
SHORTNESS OF BREATH: 1
NECK MASS: 0
SWOLLEN GLANDS: 0
BOWEL INCONTINENCE: 0
DIZZINESS: 1
VOMITING: 0
EXERCISE INTOLERANCE: 1
TROUBLE SWALLOWING: 0
SPEECH CHANGE: 0
BRUISES/BLEEDS EASILY: 1
RECTAL PAIN: 0
NERVOUS/ANXIOUS: 1
SYNCOPE: 0
TREMORS: 0
POLYDIPSIA: 1
POOR WOUND HEALING: 0
WEIGHT GAIN: 1
POLYPHAGIA: 0
HEMOPTYSIS: 0
BREAST PAIN: 1
SNORES LOUDLY: 1
INCREASED ENERGY: 1
INSOMNIA: 1
EYE IRRITATION: 1
NECK PAIN: 1
SMELL DISTURBANCE: 0
DEPRESSION: 1
SEIZURES: 0
PARALYSIS: 0
COUGH: 1
SINUS PAIN: 0
BACK PAIN: 1
SPUTUM PRODUCTION: 1
DIARRHEA: 0
COUGH DISTURBING SLEEP: 1
SORE THROAT: 0
TASTE DISTURBANCE: 0
EYE PAIN: 0
LIGHT-HEADEDNESS: 1
FLANK PAIN: 0
ABDOMINAL PAIN: 1
MUSCLE CRAMPS: 1
HYPOTENSION: 0
WHEEZING: 1
HALLUCINATIONS: 0
TINGLING: 1
BREAST MASS: 1
BLOOD IN STOOL: 0
PALPITATIONS: 1
STIFFNESS: 1
HOARSE VOICE: 0
EYE WATERING: 0
DECREASED CONCENTRATION: 1
ALTERED TEMPERATURE REGULATION: 1
SKIN CHANGES: 0
NAIL CHANGES: 0
NAUSEA: 0
CONSTIPATION: 0
ORTHOPNEA: 1
HEADACHES: 0
POSTURAL DYSPNEA: 1
SLEEP DISTURBANCES DUE TO BREATHING: 0
HEMATURIA: 0
FEVER: 0
SINUS CONGESTION: 0
DYSURIA: 0

## 2019-11-17 NOTE — PROGRESS NOTES
Endocrine Consult note    Attending Assessment/Plan :     History of left sided pheochromocytoma, 2.8 cm, removed 8/17.  This has been presumed sporadic but it might not be.    Labs to include plasma metanephrine,  CGA (refused by her insurance);   I recognize, and warned her, she is at high risk for false positive on the tests, due to untreated sleep disorder.      History of unilateral adrenalectomy; history of corticosteroid injection 11/16/17, history of prednisone burst to 40 mg/day on 10/16/19.      Prediabetes by HgbA1c    Hypothyroidism following radioiodine treatment for  Graves' .  Treat to normal target TSH. She is on LT4 175 x 6/week (150 mcg/day average).  Increase dose to 175 x 6.5/week (162.5 mcg/day average).  Repeat labs in 2 months.     Obesity BMI 43.6. Her weight is up from 203 in 2015    Sleep apnea history; not on CPAP ; insomnia up all night- upcoming sleep evaluation.  I have counseled her on the importance of treating sleep apnea, if present, relative to the obesity, prediabetes and even the catecholamine measurements.     Alee Coker MD    Chief complaint:  Randi is a 64 year old female seen in consultation at the request of Dr Chris Edwards for diabetes.    I have reviewed Care Everywhere including [facility], [facility] , [facility], [place] lab reports, imaging reports and provider notes as indicated.      HISTORY OF PRESENT ILLNESS Winnie states she needs her thyroid regulated, that it has a lot of variability.  She has history of Graves' treated with 131I x 2 many years ago.  She has been on LT4 since then.  She was on 150 alternating with 175 every other day, didn't want to pay 2 copays.  Now she is on 175 x 6/week.  She hasd been on this dose only about one month at the time of the last TFTS from 10/14/19.      Adrenal mass was lst noted in 2006.  At that time it was 1.5 cm.  On follow up imagine gin 2017 it was 2.5 cm.  She was seen by Dr Gab Linares (surgeon) about this in  2017.  I have reviewed his note.  He recommended removal at that time for suspicion of pheochromocytoma due to elevated metanephrines.  He recommended weight loss prior to surgery.  She underwent laparoscopic left adrenalectomy in 8/2/17 ([facility]).  Surgical pathology was read as pheochromocytoma 2.8x 2 x 2.2, confined to the left adrenal.  Today she recalled how this caused rapid onset rage , sweating and her BP wasn't that high.      We have the following relevant labs  8/4/09 TSH 1.48  8/17/10 TSH 1.47  10/16/13 TSH 8.39  1/7/14 TSH 14.6  3/12/14 TSH 4.2  8/5/14 TSH 3.09  10/27/15 TSH 1.24  7/15/16 TSH 0.46  12/2/16 TSH 4.62  2/8/17: TSH 3.54  4/4/17: TSH 0.3  4/19/17 urine cortisol 17 mcg/24 hours (3.5-45); metanephrine 719 mcg/24 hours (normal < 400), normetanephrine 409 mcg/24 hours (< 900), total metanephrine 1128 mcg/24 hours (< 1300), NE 47 mcg/24 hours (15-80), EPI 18 mcg/24 hoiurs (< 21),  ( mcg/24 hours), 24 hour urine volume 2025 12/19/17: TSH 3.19  8/9/19 TSH 5.35, 25OHD 35.8  8/13/19: TSH 9.15, free T4 0.87, cholesterol 221, , HDL 78, LDL 99  10/3/19 glucose 110, HgbA1c 6%, K 3.2, Na 140, creatinine 0.68  10/14/19 TSH 2.74, 25OHD 66, b12 > 2000, folate 10.8  11/5/19: glucose 131, creatinine 0.73    Winnie has been seeing Dr Edwards for new diagnosis of possible pulmonary HTN. She notes he has made some medication  changes and has recommended she have sleep evaluation, which is pending.      Winnie tells me she was on Hydrocodone x 20 years followed in pain clinic - she does not have it now due to having ETOH.  She will start suboxone tomorrow. I am unable to document any of this on care everywhere, where it looks like she was last seen in pain medicine 10/31/19    Imaging  5/15/1994 15 mCi 131I; prior 123I thyroid uptake scan: 69.1% 24 ilir ruprake; enlarged gland with prominent pyramidal lobe  12/7/1994: 123I thyroid uptake 52%  3/9/17 US thyroid ([facility])  6/16/17 MR abdomen  "([facility]): 2.5 cm left adrenal nodule (was 1.5 11/9/06)  6/24/19 CTA chest: evidence for pulmonary artery HTN, hepatic steattosis;     REVIEW OF SYSTEMS  Feels like on a roller coaster  Don't sleep at all - get mayabe 1-2 hours/night; sleeps in recliner due to shoulder pain  Weight gain  a lot of weight variability -   Can't eat that much  Hair loss on crown is recent  Always hot   Sweating today is the most she has had ; sometimes night sweats  Cardiac: had swelling in ankles when outside gardening - she was seen at urgent care and transferred to [facility], CT diagnosis of pulmonary HTN.  She was referred to cardiology; angina with walking down the hallway  Respiratory: ANNA with walking down the hallway; she is not currently using CPAP x 1-2 years (mask didn't fit and it bothered her eyes)  GI:   Has \" hiatal hernia\" now, below the left breast- - attributed to past pheo surgery; she can feel it iwith her hands, like a bulge - she had a CT because of this   Anger issues  Don't have the rage like before   Left breast is getting smaller and more dense; painful and hard  She is now back to getting phlebotomy for the hemochromatosis.    Headaches more days than not.    Some dizziness   artificial ankle doesn't have pain left     Answers for HPI/ROS submitted by the patient on 11/15/2019   General Symptoms: Yes  Skin Symptoms: Yes  HENT Symptoms: Yes  EYE SYMPTOMS: Yes  HEART SYMPTOMS: Yes  LUNG SYMPTOMS: Yes  INTESTINAL SYMPTOMS: Yes  URINARY SYMPTOMS: Yes  GYNECOLOGIC SYMPTOMS: No  BREAST SYMPTOMS: Yes  SKELETAL SYMPTOMS: Yes  BLOOD SYMPTOMS: Yes  NERVOUS SYSTEM SYMPTOMS: Yes  MENTAL HEALTH SYMPTOMS: Yes  Fever: No  Loss of appetite: No  Weight loss: No  Weight gain: Yes  Fatigue: Yes  Night sweats: No  Chills: No  Increased stress: Yes  Excessive hunger: No  Excessive thirst: Yes  Feeling hot or cold when others believe the temperature is normal: Yes  Loss of height: No  Post-operative complications: Yes  Surgical " site pain: Yes  Hallucinations: No  Change in or Loss of Energy: Yes  Hyperactivity: No  Confusion: No  Changes in hair: Yes  Changes in moles/birth marks: No  Itching: No  Rashes: Yes  Changes in nails: No  Acne: No  Hair in places you don't want it: No  Change in facial hair: No  Warts: No  Non-healing sores: No  Scarring: No  Flaking of skin: Yes  Color changes of hands/feet in cold : No  Sun sensitivity: No  Skin thickening: No  Ear pain: No  Ear discharge: No  Hearing loss: No  Tinnitus: Yes  Nosebleeds: No  Congestion: No  Sinus pain: No  Trouble swallowing: No   Voice hoarseness: No  Mouth sores: No  Sore throat: No  Tooth pain: Yes  Gum tenderness: No  Bleeding gums: No  Change in taste: No  Change in sense of smell: No  Dry mouth: Yes  Hearing aid used: No  Neck lump: No  Eye pain: No  Vision loss: Yes  Dry eyes: Yes  Watery eyes: No  Eye bulging: No  Double vision: No  Flashing of lights: No  Spots: No  Floaters: Yes  Redness: Yes  Crossed eyes: No  Tunnel Vision: No  Yellowing of eyes: No  Eye irritation: Yes  Cough: Yes  Sputum or phlegm: Yes  Coughing up blood: No  Difficulty breating or shortness of breath: Yes  Snoring: Yes  Wheezing: Yes  Difficulty breathing on exertion: Yes  Nighttime Cough: Yes  Difficulty breathing when lying flat: Yes  Chest pain or pressure: No  Fast or irregular heartbeat: Yes  Pain in legs with walking: Yes  Trouble breathing while lying down: Yes  Fingers or toes appear blue: No  High blood pressure: No  Low blood pressure: No  Fainting: No  Murmurs: No  Pacemaker: No  Varicose veins: No  Edema or swelling: Yes  Wake up at night with shortness of breath: No  Light-headedness: Yes  Exercise intolerance: Yes  Heart burn or indigestion: Yes  Nausea: No  Vomiting: No  Abdominal pain: Yes  Bloating: Yes  Constipation: No  Diarrhea: No  Blood in stool: No  Black stools: No  Rectal or Anal pain: No  Fecal incontinence: No  Yellowing of skin or eyes: No  Vomit with blood: No  Change  in stools: No  Trouble holding urine or incontinence: Yes  Pain or burning: No  Trouble starting or stopping: Yes  Increased frequency of urination: No  Blood in urine: No  Decreased frequency of urination: No  Frequent nighttime urination: No  Flank pain: No  Difficulty emptying bladder: Yes  Back pain: Yes  Muscle aches: Yes  Neck pain: Yes  Swollen joints: No  Joint pain: Yes  Bone pain: No  Muscle cramps: Yes  Muscle weakness: Yes  Joint stiffness: Yes  Bone fracture: No  Anemia: No  Swollen glands: No  Easy bleeding or bruising: Yes  Trouble with coordination: No  Dizziness or trouble with balance: Yes  Fainting or black-out spells: No  Headache: No  Seizures: No  Speech problems: No  Tingling: Yes  Tremor: No  Weakness: Yes  Difficulty walking: Yes  Paralysis: No  Numbness: Yes  Discharge: No  Lumps: Yes  Pain: Yes  Nipple retraction: No  Nervous or Anxious: Yes  Depression: Yes  Trouble sleeping: Yes  Trouble thinking or concentrating: Yes  Mood changes: Yes  Panic attacks: Yes    Past Medical History  Past Medical History:   Diagnosis Date     Anxiety      Bipolar disorder (H)      Breast cancer (H) 1986    lumpectomy, radiation, chemo     COPD (chronic obstructive pulmonary disease) (H)     asthma     Depression, major, recurrent (H)      Drug tolerance     opioid     Esophageal reflux      Fatigue      Graves disease 1994     Hemochromatosis 02/14/2018    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; H63D not detected     Hyperlipidaemia      Hypertension      Impaired fasting glucose 2017     Joint pain      Morbid obesity (H)      KYAW (obstructive sleep apnea) 2016     Osteopenia      Pheochromocytoma, left 08/02/2017    laparoscopically removed     Postablative hypothyroidism 1995     Psoriasis      Psoriatic arthropathy (H)      RLS (restless legs syndrome)      Sacroiliitis (H)      Snoring      Spinal stenosis      Vitamin B 12 deficiency 2009     Vitamin D deficiency 2010     Past Surgical History:   Procedure Laterality Date      ARTHRODESIS ANKLE       ARTHROPLASTY ANKLE Right 6/29/2015    Procedure: ARTHROPLASTY ANKLE;  Surgeon: Jason Coughlin MD;  Location: [facility]     ARTHROPLASTY REVISION ANKLE Right 6/29/2015    Procedure: ARTHROPLASTY REVISION ANKLE;  Surgeon: Jason Coughlin MD;  Location: [facility]     BREAST BIOPSY, CORE RT/LT       COLONOSCOPY       CV CORONARY ANGIOGRAM N/A 10/3/2019    Procedure: CV CORONARY ANGIOGRAM;  Surgeon: Bryce Pierre MD;  Location:  HEART CARDIAC CATH LAB     CV RIGHT HEART CATH N/A 10/3/2019    Procedure: CV RIGHT HEART CATH;  Surgeon: Bryce Pierre MD;  Location:  HEART CARDIAC CATH LAB     LAPAROSCOPIC ADRENALECTOMY Left 08/02/2017    pheochromocytoma     LENGTHEN TENDON ACHILLES Right 6/29/2015    Procedure: LENGTHEN TENDON ACHILLES;  Surgeon: Jason Coughlin MD;  Location: [facility]     LUMPECTOMY BREAST       MAMMOPLASTY REDUCTION Right 01/13/2015    [place]     MASTECTOMY MODIFIED RADICAL       REPAIR HAMMER TOE Right 6/29/2015    Procedure: REPAIR HAMMER TOE;  Surgeon: Jason Coughlin MD;  Location: [facility]     TONSILLECTOMY & ADENOIDECTOMY       Breast lumpectomy 1986  Breast lumpectomy 1994  Reconstruction on right breast to make more symmmetrical to left       Medications    Current Outpatient Medications   Medication Sig Dispense Refill     ASPIRIN PO Take 81 mg by mouth 2 times daily        benzonatate (TESSALON) 100 MG capsule TAKE 1 CAPSULE BY MOUTH THREE TIMES A DAY AS NEEDED FOR COUGH       budesonide-formoterol (SYMBICORT) 160-4.5 MCG/ACT Inhaler Inhale 2 puffs into the lungs 2 times daily       Cholecalciferol (VITAMIN D3) 82679 units TABS Take 1 tablet by mouth daily       Cyanocobalamin (VITAMIN B-12) 5000 MCG SUBL Place 5,000 mcg under the tongue       Desvenlafaxine Succinate (PRISTIQ PO) Take 100 mg by mouth daily        diclofenac (VOLTAREN) 1 % topical gel Place onto the skin 4 times daily       furosemide (LASIX) 20 MG tablet Take 2 tablets (40  "mg) by mouth daily 180 tablet 3     hydrOXYzine (VISTARIL) 25 MG capsule 25 mg 3 times daily as needed   0     levothyroxine (SYNTHROID/LEVOTHROID) 175 MCG tablet MON to SAT 1 tablet/day; SUN 0.5 tablet 90 tablet 4     Levothyroxine Sodium (SYNTHROID PO) Take 175 mcg by mouth daily 6 days rest a day and take 6 days and rest and repeat       losartan (COZAAR) 25 MG tablet Take 1 tablet (25 mg) by mouth daily 30 tablet 1     Omeprazole (PRILOSEC PO) Take 20 mg by mouth 2 times daily (before meals)       potassium chloride ER (K-DUR/KLOR-CON M) 20 MEQ CR tablet Take 1 tablet (20 mEq) by mouth daily 91 tablet 3     Pramipexole Dihydrochloride (MIRAPEX PO) Take 1 mg by mouth 2 times daily        albuterol (ACCUNEB) 0.63 MG/3ML neb solution Take 1 ampule by nebulization every 6 hours as needed for shortness of breath / dyspnea or wheezing       LT4 175 x 6/week, divided prior to today  Currently on ketamine  Takes LT4 middle of the night.     Allergies  Allergies   Allergen Reactions     Gabapentin      Drove on the wrong side of the highway     Levofloxacin      \"CAN'T REMEMBER\"     Penicillins      \"SORES IN MOUTH\"     Sulfa Drugs      \"PT DOES NOT KNOW WHAT THE REACTION WAS\"       Family History  family history includes Heart Failure in her sister; Hemochromatosis in her brother, father, and sister; Hypertension in her brother and sister; Lupus in her sister; Myocardial Infarction in her father; Obesity in her mother and sister; Scleroderma in her sister; Thyroid Disease in her mother.     Social History  Social History     Tobacco Use     Smoking status: Former Smoker     Packs/day: 2.50     Years: 20.00     Pack years: 50.00     Types: Cigarettes     Last attempt to quit: 2000     Years since quittin.4     Smokeless tobacco: Never Used   Substance Use Topics     Alcohol use: No     Drug use: No     ; used to swim at [employer] but now since the pheo surgery;     Physical Exam  /80   Pulse " "85   Ht 1.676 m (5' 6\")   Wt 122.5 kg (270 lb)   BMI 43.58 kg/m    Body mass index is 43.58 kg/m .  GENERAL :pleasant woman  In no apparent distress, talking  SKIN: Normal color, normal temperature, texture.  No gross hirsutism, alopecia or purple striae.     EYES: prominent eyes; PERRL, EOMI, No scleral icterus,  No conjunctival redness, stare, retraction  MOUTH: Moist, pink; pharynx clear  NECK: No visible masses. No palpable adenopathy, or masses. No carotid bruits.   THYROID:  Not palpable  RESP: Lungs clear to auscultation bilaterally  CARDIAC: Regular rate and rhythm, normal S1 S2, without murmurs, rubs or gallops    ABDOMEN: Normal bowel sounds; soft, nontender; thickening of tissue left upper quadrant below the left breast - feels similar to left breast thickening.,    BREASTS: skin thickening hardness left lateral breast ;   NEURO: awake, alert, responds appropriately to questions.  Difficult historian mixing the past with the present in historical review.   Cranial nerves intact.  Moves all extremities; No tremor of the outstretched hand.  DTRs  0 /4 ,   EXTREMITIES: No clubbing, cyanosis or edema.    DATA REVIEW      MR Abdomen With Without Contrast6/16/2017  [facility]  Result Impression   CONCLUSION:  2.5 cm left adrenal nodule has slowly enlarged from 1.5 cm on the most remote available study of 11/9/2006. Slow growth is evidence against malignant pheochromocytoma. The imaging findings cannot differentiate with specificity between benign   pheochromocytoma and lipid poor adenoma which are the major diagnostic considerations. Nuclear medicine MIBG scan may be useful for further evaluation.   Result Narrative   MR ABDOMEN W WO CONTRAST  6/15/2017 5:26 PM    INDICATION: Enlarging nodule of the left adrenal gland. Elevated urinary metanephrines. Concern for pheochromocytoma.  TECHNIQUE: Routine MRI liver protocol with multiple axial and coronal T1 and T2 sequences. Exam without and with IV " contrast.   IV CONTRAST: 10ml gadavist  COMPARISON: CT abdomen and pelvis 4/10/2017 and 08/11/2014. CT of the chest 11/9/2006.    FINDINGS: The left adrenal nodule of interest on the most recent study of 4/10/2017 now measures 2.5 cm. The nodule was present on the most remote available study, a CT chest of 11/9/2006 at which time it measured 1.5 cm. There is no significant change   in signal intensity within the lesion on T1-weighted out of phase images compared to in phase images. The lesion is fairly homogeneously hyperintense on T2-weighted images and diffusion-weighted images, and demonstrates avid early enhancement with   contrast which persists on subsequent phases.    Atelectasis of the dependent right lower lobe is mild. Liver, gallbladder, bile ducts, spleen, pancreas, right adrenal gland, and kidneys are negative. No lymphadenopathy or free fluid.     US Thyroid3/9/2017  [facility]  Result Impression   CONCLUSION:  1.  Atrophic thyroid gland.   Result Narrative   US THYROID  3/9/2017 12:01 PM    INDICATION: Nontoxic goiter, unspecified  TECHNIQUE: Routine.  COMPARISON: None.    FINDINGS:  Right thyroid lobe measures 2.5 x 0.8 x 0.8 cm. Atrophic, inhomogeneous gland. No focal nodule.      Left thyroid lobe measures 3.3 x 0.7 x 1 cm. Atrophic, inhomogeneous gland. No focal nodule.    Isthmus measures 1 mm. No additional findings.    No cervical lymphadenopathy.     Case Report Surgical Pathology                                Case: [id number]                                    Authorizing Provider:  Gab Linares MD         Collected:           08/02/2017 1339              Ordering Location:     [facility] OR     Received:            08/02/2017 145              Pathologist:           Lachelle Subramanian MD                                                          Specimen:    Adrenal, Left                                                                             Final Diagnosis LEFT  ADRENAL GLAND, ADRENALECTOMY:      1. PHEOCHROMOCYTOMA          a. SIZE: 2.8 X 2.0 X 2.2 CM          b. CONFINED TO ADRENAL GLAND; EXTRA-ADRENAL SOFT TISSUE EXTENSION              NOT IDENTIFIED          c. TUMOR DEMONSTRATES DIFFUSE STAINING FOR CHROMOGRANIN AND NEGATIVE              STAINING FOR INHIBIN, SUPPORTING DIAGNOSIS      2. HISTOLOGICALLY UNREMARKABLE ADRENAL CORTEX     Microscopic Description Microscopic examination performed, substantiating the above diagnosis.    All controls stain appropriately.     Clinical Information Pre-op Diagnosis: Pheochromocytoma [D35.00]  Time in Formalin: 01:40 pm     Gross Description The specimen is received in formalin and is identified as left adrenal. It consists of a 22 gram, 4.3 x 2.7 x 3.0 cm nodular adrenal gland. The external surface is marked with black ink. Cut section shows adrenal cortex, 0.1-0.2 cm in thickness. There is a well-circumscribed 2.8 x 2.0 x 2.2 cm subcortical nodule with a mottled gray-pink to tan cut surface. Gross extension through the adrenal cortex and into adjacent adipose tissue is not identified. RS-4C KLW:dls     Charges CPT:  34402, 57163, 23814   ICD-10:  D35.02     Result Flag     Comment:    SPECIMEN PROCESSING:    All histology slide preparation and stains; and cytology slide preparation, staining, and cytotechnologist screening done at [facility] are performed at [facility], [address] [place] [address], [id number], with final interpretation, frozen section analysis, and cytology adequacy assessment at indicated laboratory.      CTA Chest PE Run6/24/2019  [facility]  Result Impression   CONCLUSION:  1.  Evidence for pulmonary artery hypertension. No pulmonary emboli, aortic aneurysm or dissection. Atherosclerotic plaque including coronary artery disease.  2.  Moderate-sized hiatal hernia.  3.  Hepatic steatosis.  4.  Remainder stable.   Result Narrative   EXAM: CTA CHEST PE RUN  LOCATION: [facility]  HOSPITAL  DATE/TIME: 6/24/2019 8:01 PM    INDICATION: Shortness of breath with leg swelling, breast cancer, COPD.  COMPARISON: 04/10/2017.  TECHNIQUE: Helical acquisition through the chest was performed during the arterial phase of contrast enhancement using IV contrast. 2D and 3D reconstructions were performed by the CT technologist. Dose reduction techniques were used.   IV CONTRAST: Iohexol (Omni) 100 mL.    FINDINGS:  ANGIOGRAM CHEST: Atherosclerotic plaque including coronary artery calcification. No aortic aneurysm or dissection. Mildly prominent right and left main pulmonary arteries suggestive of pulmonary artery hypertension. No pulmonary emboli.    RV/LV RATIO: N/A    LUNGS AND PLEURA: Stable benign 4 mm pulmonary nodule pleural-based on image #77 at the left lung base. Mild to moderate scattered fibroatelectasis. Diffuse air trapping often associated with small vessel or small airways disease. No effusion.    MEDIASTINUM: Moderate sized hiatal hernia. No mass or adenopathy.    LIMITED UPPER ABDOMEN: Hepatic steatosis. Left adrenal mass on prior not imaged.    MUSCULOSKELETAL: Degenerative disease. Mastectomy with reconstruction as on prior.

## 2019-11-18 ENCOUNTER — RECORDS - HEALTHEAST (OUTPATIENT)
Dept: ADMINISTRATIVE | Facility: OTHER | Age: 64
End: 2019-11-18

## 2019-11-18 ENCOUNTER — PRE VISIT (OUTPATIENT)
Dept: ENDOCRINOLOGY | Facility: CLINIC | Age: 64
End: 2019-11-18

## 2019-11-18 ENCOUNTER — OFFICE VISIT (OUTPATIENT)
Dept: ENDOCRINOLOGY | Facility: CLINIC | Age: 64
End: 2019-11-18
Payer: MEDICARE

## 2019-11-18 ENCOUNTER — TELEPHONE (OUTPATIENT)
Dept: CARDIOLOGY | Facility: CLINIC | Age: 64
End: 2019-11-18

## 2019-11-18 VITALS
HEIGHT: 66 IN | DIASTOLIC BLOOD PRESSURE: 80 MMHG | HEART RATE: 85 BPM | SYSTOLIC BLOOD PRESSURE: 135 MMHG | BODY MASS INDEX: 43.39 KG/M2 | WEIGHT: 270 LBS

## 2019-11-18 DIAGNOSIS — R73.03 PREDIABETES: ICD-10-CM

## 2019-11-18 DIAGNOSIS — E89.0 POSTABLATIVE HYPOTHYROIDISM: Primary | ICD-10-CM

## 2019-11-18 DIAGNOSIS — E66.01 CLASS 3 SEVERE OBESITY WITH SERIOUS COMORBIDITY AND BODY MASS INDEX (BMI) OF 40.0 TO 44.9 IN ADULT, UNSPECIFIED OBESITY TYPE (H): ICD-10-CM

## 2019-11-18 DIAGNOSIS — Z92.241 HISTORY OF CORTICOSTEROID THERAPY: ICD-10-CM

## 2019-11-18 DIAGNOSIS — E89.0 POSTABLATIVE HYPOTHYROIDISM: ICD-10-CM

## 2019-11-18 DIAGNOSIS — E89.6 HISTORY OF PARTIAL ADRENALECTOMY (H): ICD-10-CM

## 2019-11-18 DIAGNOSIS — Z86.018 HISTORY OF PHEOCHROMOCYTOMA: ICD-10-CM

## 2019-11-18 DIAGNOSIS — E66.813 CLASS 3 SEVERE OBESITY WITH SERIOUS COMORBIDITY AND BODY MASS INDEX (BMI) OF 40.0 TO 44.9 IN ADULT, UNSPECIFIED OBESITY TYPE (H): ICD-10-CM

## 2019-11-18 LAB
T4 FREE SERPL-MCNC: 0.86 NG/DL (ref 0.76–1.46)
TSH SERPL DL<=0.005 MIU/L-ACNC: 7.54 MU/L (ref 0.4–4)

## 2019-11-18 RX ORDER — BENZONATATE 100 MG/1
CAPSULE ORAL
COMMUNITY
Start: 2019-10-17 | End: 2021-01-07

## 2019-11-18 RX ORDER — LEVOTHYROXINE SODIUM 175 UG/1
TABLET ORAL
Qty: 90 TABLET | Refills: 4 | Status: SHIPPED | OUTPATIENT
Start: 2019-11-18 | End: 2020-02-14

## 2019-11-18 ASSESSMENT — MIFFLIN-ST. JEOR: SCORE: 1781.46

## 2019-11-18 ASSESSMENT — PAIN SCALES - GENERAL: PAINLEVEL: NO PAIN (0)

## 2019-11-18 NOTE — TELEPHONE ENCOUNTER
Patient called and was worried that her cardiac rehab was not going to be covered by her insurance; I gave her the diagnostic codes that were associated with her appointment and told her to follow up with Medicare to determine if she'll be covered. Patient also verbalized she got a bill from Dr. Edwards's office and was wondering why. I gave the patient the 's number and told her to contact them for clarification. Patient verbalized understanding and didn't have any further questions. Bindu Walden RN on 11/18/2019 at 10:17 AM

## 2019-11-18 NOTE — LETTER
11/18/2019       RE: Randi Cleary  [address]  [facility] [address]     Dear Colleague,    Thank you for referring your patient, Randi Cleary, to the [facility] ENDOCRINOLOGY at [facility]. Please see a copy of my visit note below.    Endocrine Consult note    Attending Assessment/Plan :     History of left sided pheochromocytoma, 2.8 cm, removed 8/17.  This has been presumed sporadic but it might not be.    Labs to include plasma metanephrine,  CGA (refused by her insurance);   I recognize, and warned her, she is at high risk for false positive on the tests, due to untreated sleep disorder.      History of unilateral adrenalectomy; history of corticosteroid injection 11/16/17, history of prednisone burst to 40 mg/day on 10/16/19.      Prediabetes by HgbA1c    Hypothyroidism following radioiodine treatment for  Graves' .  Treat to normal target TSH. She is on LT4 175 x 6/week (150 mcg/day average).  Increase dose to 175 x 6.5/week (162.5 mcg/day average).  Repeat labs in 2 months.     Obesity BMI 43.6. Her weight is up from 203 in 2015    Sleep apnea history; not on CPAP ; insomnia up all night- upcoming sleep evaluation.  I have counseled her on the importance of treating sleep apnea, if present, relative to the obesity, prediabetes and even the catecholamine measurements.     Alee Coker MD    Chief complaint:  Randi is a 64 year old female seen in consultation at the request of Dr Chris Edwards for diabetes.    I have reviewed Care Everywhere including [facility], [facility] , Critical access hospital, [facility] lab reports, imaging reports and provider notes as indicated.      HISTORY OF PRESENT ILLNESS Winnie states she needs her thyroid regulated, that it has a lot of variability.  She has history of Graves' treated with 131I x 2 many years ago.  She has been on LT4 since then.  She was on 150 alternating with 175 every other day, didn't want to pay 2 copays.   Now she is on 175 x 6/week.  She hasd been on this dose only about one month at the time of the last TFTS from 10/14/19.      Adrenal mass was lst noted in 2006.  At that time it was 1.5 cm.  On follow up imagine gin 2017 it was 2.5 cm.  She was seen by Dr Gab Linares (surgeon) about this in 2017.  I have reviewed his note.  He recommended removal at that time for suspicion of pheochromocytoma due to elevated metanephrines.  He recommended weight loss prior to surgery.  She underwent laparoscopic left adrenalectomy in 8/2/17 ([facility]).  Surgical pathology was read as pheochromocytoma 2.8x 2 x 2.2, confined to the left adrenal.  Today she recalled how this caused rapid onset rage , sweating and her BP wasn't that high.      We have the following relevant labs  8/4/09 TSH 1.48  8/17/10 TSH 1.47  10/16/13 TSH 8.39  1/7/14 TSH 14.6  3/12/14 TSH 4.2  8/5/14 TSH 3.09  10/27/15 TSH 1.24  7/15/16 TSH 0.46  12/2/16 TSH 4.62  2/8/17: TSH 3.54  4/4/17: TSH 0.3  4/19/17 urine cortisol 17 mcg/24 hours (3.5-45); metanephrine 719 mcg/24 hours (normal < 400), normetanephrine 409 mcg/24 hours (< 900), total metanephrine 1128 mcg/24 hours (< 1300), NE 47 mcg/24 hours (15-80), EPI 18 mcg/24 hoiurs (< 21),  ( mcg/24 hours), 24 hour urine volume 2025 12/19/17: TSH 3.19  8/9/19 TSH 5.35, 25OHD 35.8  8/13/19: TSH 9.15, free T4 0.87, cholesterol 221, , HDL 78, LDL 99  10/3/19 glucose 110, HgbA1c 6%, K 3.2, Na 140, creatinine 0.68  10/14/19 TSH 2.74, 25OHD 66, b12 > 2000, folate 10.8  11/5/19: glucose 131, creatinine 0.73    Winnie has been seeing Dr Edwards for new diagnosis of possible pulmonary HTN. She notes he has made some medication  changes and has recommended she have sleep evaluation, which is pending.      Winnie tells me she was on Hydrocodone x 20 years followed in pain clinic - she does not have it now due to having ETOH.  She will start suboxone tomorrow. I am unable to document any of this on care  "everywhere, where it looks like she was last seen in pain medicine 10/31/19    Imaging  5/15/1994 15 mCi 131I; prior 123I thyroid uptake scan: 69.1% 24 ilir ruprake; enlarged gland with prominent pyramidal lobe  12/7/1994: 123I thyroid uptake 52%  3/9/17 US thyroid ([facility])  6/16/17 MR abdomen ([facility]): 2.5 cm left adrenal nodule (was 1.5 11/9/06)  6/24/19 CTA chest: evidence for pulmonary artery HTN, hepatic steattosis;     REVIEW OF SYSTEMS  Feels like on a roller coaster  Don't sleep at all - get mayabe 1-2 hours/night; sleeps in recliner due to shoulder pain  Weight gain  a lot of weight variability -   Can't eat that much  Hair loss on crown is recent  Always hot   Sweating today is the most she has had ; sometimes night sweats  Cardiac: had swelling in ankles when outside gardening - she was seen at urgent care and transferred to [facility], CT diagnosis of pulmonary HTN.  She was referred to cardiology; angina with walking down the hallway  Respiratory: ANNA with walking down the hallway; she is not currently using CPAP x 1-2 years (mask didn't fit and it bothered her eyes)  GI:   Has \" hiatal hernia\" now, below the left breast- - attributed to past pheo surgery; she can feel it iwith her hands, like a bulge - she had a CT because of this   Anger issues  Don't have the rage like before   Left breast is getting smaller and more dense; painful and hard  She is now back to getting phlebotomy for the hemochromatosis.    Headaches more days than not.    Some dizziness   artificial ankle doesn't have pain left     Past Medical History  Past Medical History:   Diagnosis Date     Anxiety      Bipolar disorder (H)      Breast cancer (H) 1986    lumpectomy, radiation, chemo     COPD (chronic obstructive pulmonary disease) (H)     asthma     Depression, major, recurrent (H)      Drug tolerance     opioid     Esophageal reflux      Fatigue      Graves disease 1994     Hemochromatosis 02/14/2018    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; " H63D not detected     Hyperlipidaemia      Hypertension      Impaired fasting glucose 2017     Joint pain      Morbid obesity (H)      KYAW (obstructive sleep apnea) 2016     Osteopenia      Pheochromocytoma, left 08/02/2017    laparoscopically removed     Postablative hypothyroidism 1995     Psoriasis      Psoriatic arthropathy (H)      RLS (restless legs syndrome)      Sacroiliitis (H)      Snoring      Spinal stenosis      Vitamin B 12 deficiency 2009     Vitamin D deficiency 2010     Past Surgical History:   Procedure Laterality Date     ARTHRODESIS ANKLE       ARTHROPLASTY ANKLE Right 6/29/2015    Procedure: ARTHROPLASTY ANKLE;  Surgeon: Jason Coughlin MD;  Location: [facility]     ARTHROPLASTY REVISION ANKLE Right 6/29/2015    Procedure: ARTHROPLASTY REVISION ANKLE;  Surgeon: Jason Coughlin MD;  Location: [facility]     BREAST BIOPSY, CORE RT/LT       COLONOSCOPY       CV CORONARY ANGIOGRAM N/A 10/3/2019    Procedure: CV CORONARY ANGIOGRAM;  Surgeon: Bryce Pierre MD;  Location:  HEART CARDIAC CATH LAB     CV RIGHT HEART CATH N/A 10/3/2019    Procedure: CV RIGHT HEART CATH;  Surgeon: Bryce Peirre MD;  Location:  HEART CARDIAC CATH LAB     LAPAROSCOPIC ADRENALECTOMY Left 08/02/2017    pheochromocytoma     LENGTHEN TENDON ACHILLES Right 6/29/2015    Procedure: LENGTHEN TENDON ACHILLES;  Surgeon: Jason Coughlin MD;  Location: [facility]     LUMPECTOMY BREAST       MAMMOPLASTY REDUCTION Right 01/13/2015    [place]     MASTECTOMY MODIFIED RADICAL       REPAIR HAMMER TOE Right 6/29/2015    Procedure: REPAIR HAMMER TOE;  Surgeon: Jason Coughlin MD;  Location: [facility]     TONSILLECTOMY & ADENOIDECTOMY       Breast lumpectomy 1986  Breast lumpectomy 1994  Reconstruction on right breast to make more symmmetrical to left       Medications    Current Outpatient Medications   Medication Sig Dispense Refill     ASPIRIN PO Take 81 mg by mouth 2 times daily        benzonatate (TESSALON) 100 MG  "capsule TAKE 1 CAPSULE BY MOUTH THREE TIMES A DAY AS NEEDED FOR COUGH       budesonide-formoterol (SYMBICORT) 160-4.5 MCG/ACT Inhaler Inhale 2 puffs into the lungs 2 times daily       Cholecalciferol (VITAMIN D3) 95264 units TABS Take 1 tablet by mouth daily       Cyanocobalamin (VITAMIN B-12) 5000 MCG SUBL Place 5,000 mcg under the tongue       Desvenlafaxine Succinate (PRISTIQ PO) Take 100 mg by mouth daily        diclofenac (VOLTAREN) 1 % topical gel Place onto the skin 4 times daily       furosemide (LASIX) 20 MG tablet Take 2 tablets (40 mg) by mouth daily 180 tablet 3     hydrOXYzine (VISTARIL) 25 MG capsule 25 mg 3 times daily as needed   0     levothyroxine (SYNTHROID/LEVOTHROID) 175 MCG tablet MON to SAT 1 tablet/day; SUN 0.5 tablet 90 tablet 4     Levothyroxine Sodium (SYNTHROID PO) Take 175 mcg by mouth daily 6 days rest a day and take 6 days and rest and repeat       losartan (COZAAR) 25 MG tablet Take 1 tablet (25 mg) by mouth daily 30 tablet 1     Omeprazole (PRILOSEC PO) Take 20 mg by mouth 2 times daily (before meals)       potassium chloride ER (K-DUR/KLOR-CON M) 20 MEQ CR tablet Take 1 tablet (20 mEq) by mouth daily 91 tablet 3     Pramipexole Dihydrochloride (MIRAPEX PO) Take 1 mg by mouth 2 times daily        albuterol (ACCUNEB) 0.63 MG/3ML neb solution Take 1 ampule by nebulization every 6 hours as needed for shortness of breath / dyspnea or wheezing       LT4 175 x 6/week, divided prior to today  Currently on ketamine  Takes LT4 middle of the night.     Allergies  Allergies   Allergen Reactions     Gabapentin      Drove on the wrong side of the highway     Levofloxacin      \"CAN'T REMEMBER\"     Penicillins      \"SORES IN MOUTH\"     Sulfa Drugs      \"PT DOES NOT KNOW WHAT THE REACTION WAS\"       Family History  family history includes Heart Failure in her sister; Hemochromatosis in her brother, father, and sister; Hypertension in her brother and sister; Lupus in her sister; Myocardial Infarction " "in her father; Obesity in her mother and sister; Scleroderma in her sister; Thyroid Disease in her mother.     Social History  Social History     Tobacco Use     Smoking status: Former Smoker     Packs/day: 2.50     Years: 20.00     Pack years: 50.00     Types: Cigarettes     Last attempt to quit: 2000     Years since quittin.4     Smokeless tobacco: Never Used   Substance Use Topics     Alcohol use: No     Drug use: No     ; used to swim at [employer] but now since the pheo surgery;     Physical Exam  /80   Pulse 85   Ht 1.676 m (5' 6\")   Wt 122.5 kg (270 lb)   BMI 43.58 kg/m     Body mass index is 43.58 kg/m .  GENERAL :pleasant woman  In no apparent distress, talking  SKIN: Normal color, normal temperature, texture.  No gross hirsutism, alopecia or purple striae.     EYES: prominent eyes; PERRL, EOMI, No scleral icterus,  No conjunctival redness, stare, retraction  MOUTH: Moist, pink; pharynx clear  NECK: No visible masses. No palpable adenopathy, or masses. No carotid bruits.   THYROID:  Not palpable  RESP: Lungs clear to auscultation bilaterally  CARDIAC: Regular rate and rhythm, normal S1 S2, without murmurs, rubs or gallops    ABDOMEN: Normal bowel sounds; soft, nontender; thickening of tissue left upper quadrant below the left breast - feels similar to left breast thickening.,    BREASTS: skin thickening hardness left lateral breast ;   NEURO: awake, alert, responds appropriately to questions.  Difficult historian mixing the past with the present in historical review.   Cranial nerves intact.  Moves all extremities; No tremor of the outstretched hand.  DTRs  0 /4 ,   EXTREMITIES: No clubbing, cyanosis or edema.    DATA REVIEW      MR Abdomen With Without Contrast2017  [facility]  Result Impression   CONCLUSION:  2.5 cm left adrenal nodule has slowly enlarged from 1.5 cm on the most remote available study of 2006. Slow growth is evidence against malignant " pheochromocytoma. The imaging findings cannot differentiate with specificity between benign   pheochromocytoma and lipid poor adenoma which are the major diagnostic considerations. Nuclear medicine MIBG scan may be useful for further evaluation.   Result Narrative   MR ABDOMEN W WO CONTRAST  6/15/2017 5:26 PM    INDICATION: Enlarging nodule of the left adrenal gland. Elevated urinary metanephrines. Concern for pheochromocytoma.  TECHNIQUE: Routine MRI liver protocol with multiple axial and coronal T1 and T2 sequences. Exam without and with IV contrast.   IV CONTRAST: 10ml gadavist  COMPARISON: CT abdomen and pelvis 4/10/2017 and 08/11/2014. CT of the chest 11/9/2006.    FINDINGS: The left adrenal nodule of interest on the most recent study of 4/10/2017 now measures 2.5 cm. The nodule was present on the most remote available study, a CT chest of 11/9/2006 at which time it measured 1.5 cm. There is no significant change   in signal intensity within the lesion on T1-weighted out of phase images compared to in phase images. The lesion is fairly homogeneously hyperintense on T2-weighted images and diffusion-weighted images, and demonstrates avid early enhancement with   contrast which persists on subsequent phases.    Atelectasis of the dependent right lower lobe is mild. Liver, gallbladder, bile ducts, spleen, pancreas, right adrenal gland, and kidneys are negative. No lymphadenopathy or free fluid.     US Thyroid3/9/2017  [facility]  Result Impression   CONCLUSION:  1.  Atrophic thyroid gland.   Result Narrative   US THYROID  3/9/2017 12:01 PM    INDICATION: Nontoxic goiter, unspecified  TECHNIQUE: Routine.  COMPARISON: None.    FINDINGS:  Right thyroid lobe measures 2.5 x 0.8 x 0.8 cm. Atrophic, inhomogeneous gland. No focal nodule.      Left thyroid lobe measures 3.3 x 0.7 x 1 cm. Atrophic, inhomogeneous gland. No focal nodule.    Isthmus measures 1 mm. No additional findings.    No cervical  lymphadenopathy.     Case Report Surgical Pathology                                Case: [id number]                                    Authorizing Provider:  Gab Linares MD         Collected:           08/02/2017 1339              Ordering Location:     [facility] OR     Received:            08/02/2017 2074              Pathologist:           Lachelle Subramanian MD                                                          Specimen:    Adrenal, Left                                                                             Final Diagnosis LEFT ADRENAL GLAND, ADRENALECTOMY:      1. PHEOCHROMOCYTOMA          a. SIZE: 2.8 X 2.0 X 2.2 CM          b. CONFINED TO ADRENAL GLAND; EXTRA-ADRENAL SOFT TISSUE EXTENSION              NOT IDENTIFIED          c. TUMOR DEMONSTRATES DIFFUSE STAINING FOR CHROMOGRANIN AND NEGATIVE              STAINING FOR INHIBIN, SUPPORTING DIAGNOSIS      2. HISTOLOGICALLY UNREMARKABLE ADRENAL CORTEX     Microscopic Description Microscopic examination performed, substantiating the above diagnosis.    All controls stain appropriately.     Clinical Information Pre-op Diagnosis: Pheochromocytoma [D35.00]  Time in Formalin: 01:40 pm     Gross Description The specimen is received in formalin and is identified as left adrenal. It consists of a 22 gram, 4.3 x 2.7 x 3.0 cm nodular adrenal gland. The external surface is marked with black ink. Cut section shows adrenal cortex, 0.1-0.2 cm in thickness. There is a well-circumscribed 2.8 x 2.0 x 2.2 cm subcortical nodule with a mottled gray-pink to tan cut surface. Gross extension through the adrenal cortex and into adjacent adipose tissue is not identified. RS-4C KLW:dls     Charges CPT:  11724, 42781, 60107   ICD-10:  D35.02     Result Flag     Comment:    SPECIMEN PROCESSING:    All histology slide preparation and stains; and cytology slide preparation, staining, and cytotechnologist screening done at [facility] are performed at [facility],  [address], [address], with final interpretation, frozen section analysis, and cytology adequacy assessment at indicated laboratory.      CTA Chest PE Run6/24/2019  [facility]  Result Impression   CONCLUSION:  1.  Evidence for pulmonary artery hypertension. No pulmonary emboli, aortic aneurysm or dissection. Atherosclerotic plaque including coronary artery disease.  2.  Moderate-sized hiatal hernia.  3.  Hepatic steatosis.  4.  Remainder stable.   Result Narrative   EXAM: CTA CHEST PE RUN  LOCATION: [facility]  DATE/TIME: 6/24/2019 8:01 PM    INDICATION: Shortness of breath with leg swelling, breast cancer, COPD.  COMPARISON: 04/10/2017.  TECHNIQUE: Helical acquisition through the chest was performed during the arterial phase of contrast enhancement using IV contrast. 2D and 3D reconstructions were performed by the CT technologist. Dose reduction techniques were used.   IV CONTRAST: Iohexol (Omni) 100 mL.    FINDINGS:  ANGIOGRAM CHEST: Atherosclerotic plaque including coronary artery calcification. No aortic aneurysm or dissection. Mildly prominent right and left main pulmonary arteries suggestive of pulmonary artery hypertension. No pulmonary emboli.    RV/LV RATIO: N/A    LUNGS AND PLEURA: Stable benign 4 mm pulmonary nodule pleural-based on image #77 at the left lung base. Mild to moderate scattered fibroatelectasis. Diffuse air trapping often associated with small vessel or small airways disease. No effusion.    MEDIASTINUM: Moderate sized hiatal hernia. No mass or adenopathy.    LIMITED UPPER ABDOMEN: Hepatic steatosis. Left adrenal mass on prior not imaged.    MUSCULOSKELETAL: Degenerative disease. Mastectomy with reconstruction as on prior.       Again, thank you for allowing me to participate in the care of your patient.      Sincerely,    Alee Coker MD

## 2019-11-18 NOTE — NURSING NOTE
Chief Complaint   Patient presents with     New Patient     adrenal and thyroid issues     Yanet Coleman CMA

## 2019-11-19 ENCOUNTER — COMMUNICATION - HEALTHEAST (OUTPATIENT)
Dept: PALLIATIVE MEDICINE | Facility: OTHER | Age: 64
End: 2019-11-19

## 2019-11-19 PROBLEM — E89.6 HISTORY OF PARTIAL ADRENALECTOMY (H): Status: ACTIVE | Noted: 2019-11-19

## 2019-11-19 PROBLEM — Z86.018 HISTORY OF PHEOCHROMOCYTOMA: Status: ACTIVE | Noted: 2019-11-19

## 2019-11-19 NOTE — PATIENT INSTRUCTIONS
Information for patients on Levo-thyroxine      The thyroid hormone, Levo-thyroxine (L-T4) is one of the main hormones normally produced by the thyroid.  The drug is identical in chemical structure to the natural product.  Levothyroxine is used to treat hypothyroidism (underactive thyroid).  Sometimes it is used even when the thyroid is not underactive, to treat a goiter (enlarged thyroid) or a thyroid nodule.  For patients with a history of thyroid removal, L-T4 is used to replace the deficiency created by the surgery.    The purpose of giving L-T4 to treat hypothyroidism is to make up for the thyroid hormone previously produced by your thyroid before it failed.  Depending on the extent of your thyroid failure, you may require a higher or a lower dose of L-T4.  The goal is to make your blood level of TSH (a hormone made by the master gland, the pituitary, the gland which controls and judges the thyroid) normal.    This drug is usually well-tolerated and safe but it is important to take the proper dose for YOU.  This involves periodic measurements of TSH, usually within 1-2 months of a dose change.  You should be off biotin containing supplements for at least one week prior to thyroid blood tests.    You should alert your doctor if you have signs you are getting too much L-T4.  These include excessive nervousness, heart fluttering or skipping beats, diarrhea, or feeling too hot and/or sweaty.      There are many brand names for Levo-thyroxine (L-T4), including Synthroid, Levoxyl, Levothroid, Unithroid, Tirosint and others.  Changing from one brand name thyroid hormone product to another or to a generic product (all generics are called levothyroxine) can cause changes in your thyroid hormone balance, even if the dose stays the same.  Since generic products can come from many different companies (such as [employer], [employer], [employer] and others), there may be less consistency among the different generic preparations.  The  decision to change brands or to change to a generic product must be made with your physician or other caregiver.  Follow-up testing should be arranged to monitor for any needed adjustments.  Monitoring may need to be more frequent if you always receive a generic thyroid hormone product.    For proper thyroid hormone balance the dose of thyroid hormone in your pills must be precise and consistent.    Be sure to take the medication as prescribed on an empty stomach, and at least 4 hours apart from calcium supplements, iron and soy products.  Store the medication away from severe heat and humidity.    You should be OFF any biotin containing supplements at least 7 days prior to thyroid lab draws.       Many insurance companies and other providers charge higher co-payments for brand name medications.  Since brand name L-T4 is not very expensive, be sure to ask if the actual cost of buying the medication is less than the co-payment.  In some instances the charge for a co-payment may be greater than buying the drug outright, especially if the prescription needs to be refilled every 30 days.    Using a pill tray can help you keep track of your medication.  Specifically, if you get a pill tray that has all days of the week (Sunday-Saturday) and also 4 boxes for each day (often labeled as breafkast, lunch, dinner and bedtime) you can use the box to fill your once/day  pills for a whole month.  If you miss a levothyroxine or vitamin D dose you can take 2 the next day.

## 2019-11-20 ENCOUNTER — COMMUNICATION - HEALTHEAST (OUTPATIENT)
Dept: PULMONOLOGY | Facility: OTHER | Age: 64
End: 2019-11-20

## 2019-11-20 ENCOUNTER — TELEPHONE (OUTPATIENT)
Dept: ENDOCRINOLOGY | Facility: CLINIC | Age: 64
End: 2019-11-20

## 2019-11-20 NOTE — TELEPHONE ENCOUNTER
Tylor from [facility] lab called and the Metanephrine serum  cannot be run there was a lab accident. Tylor will contact patient to  have her come back for a repeat draw.Tiana Felix RN on 11/20/2019 at 11:26 AM

## 2019-11-21 ENCOUNTER — AMBULATORY - HEALTHEAST (OUTPATIENT)
Dept: PALLIATIVE MEDICINE | Facility: OTHER | Age: 64
End: 2019-11-21

## 2019-11-21 ENCOUNTER — OFFICE VISIT - HEALTHEAST (OUTPATIENT)
Dept: PALLIATIVE MEDICINE | Facility: OTHER | Age: 64
End: 2019-11-21

## 2019-11-22 ENCOUNTER — COMMUNICATION - HEALTHEAST (OUTPATIENT)
Dept: PULMONOLOGY | Facility: OTHER | Age: 64
End: 2019-11-22

## 2019-11-22 ENCOUNTER — AMBULATORY - HEALTHEAST (OUTPATIENT)
Dept: INTENSIVE CARE | Facility: CLINIC | Age: 64
End: 2019-11-22

## 2019-11-22 ENCOUNTER — COMMUNICATION - HEALTHEAST (OUTPATIENT)
Dept: INTERNAL MEDICINE | Facility: CLINIC | Age: 64
End: 2019-11-22

## 2019-11-22 DIAGNOSIS — J44.1 CHRONIC OBSTRUCTIVE PULMONARY DISEASE WITH ACUTE EXACERBATION (H): ICD-10-CM

## 2019-11-25 ENCOUNTER — TELEPHONE (OUTPATIENT)
Dept: ENDOCRINOLOGY | Facility: CLINIC | Age: 64
End: 2019-11-25

## 2019-11-25 NOTE — TELEPHONE ENCOUNTER
[facility] Call Center    Phone Message    May a detailed message be left on voicemail: yes    Reason for Call: Order(s): Other:   Reason for requested: Pt called back to check on her request for Metanephrine serum lab orders be sent over to [facility] labs. fax: [phone]. Please call Pt once this has been completed.  Date needed: asap  Provider name: John Paul      Action Taken: Message routed to:  [facility] [place]

## 2019-11-25 NOTE — TELEPHONE ENCOUNTER
Spoke with patient and lab order was sent to [facility] lab at [phone].

## 2019-11-25 NOTE — TELEPHONE ENCOUNTER
[facility] Call Center    Phone Message    May a detailed message be left on voicemail: yes    Reason for Call: Other: Pt was notified by the lab that they were unable to process one of her labs. She is requesting her Metanephrine serum lab orders be sent over to [facility] labs. fax: [phone]     Action Taken: Message routed to:  [facility] ([facility] Endo

## 2019-12-04 ENCOUNTER — COMMUNICATION - HEALTHEAST (OUTPATIENT)
Dept: INTERNAL MEDICINE | Facility: CLINIC | Age: 64
End: 2019-12-04

## 2019-12-04 ENCOUNTER — HOSPITAL ENCOUNTER (OUTPATIENT)
Dept: CARDIAC REHAB | Facility: CLINIC | Age: 64
End: 2019-12-04
Attending: NURSE PRACTITIONER
Payer: MEDICARE

## 2019-12-04 DIAGNOSIS — Z92.241 HISTORY OF CORTICOSTEROID THERAPY: ICD-10-CM

## 2019-12-04 DIAGNOSIS — Z86.018 HISTORY OF PHEOCHROMOCYTOMA: ICD-10-CM

## 2019-12-04 DIAGNOSIS — E66.01 CLASS 3 SEVERE OBESITY WITH SERIOUS COMORBIDITY AND BODY MASS INDEX (BMI) OF 40.0 TO 44.9 IN ADULT, UNSPECIFIED OBESITY TYPE (H): ICD-10-CM

## 2019-12-04 DIAGNOSIS — I20.89 STABLE ANGINA (H): Primary | ICD-10-CM

## 2019-12-04 DIAGNOSIS — E89.6 HISTORY OF PARTIAL ADRENALECTOMY (H): ICD-10-CM

## 2019-12-04 DIAGNOSIS — E66.813 CLASS 3 SEVERE OBESITY WITH SERIOUS COMORBIDITY AND BODY MASS INDEX (BMI) OF 40.0 TO 44.9 IN ADULT, UNSPECIFIED OBESITY TYPE (H): ICD-10-CM

## 2019-12-04 DIAGNOSIS — R73.03 PREDIABETES: ICD-10-CM

## 2019-12-04 DIAGNOSIS — E89.0 POSTABLATIVE HYPOTHYROIDISM: ICD-10-CM

## 2019-12-04 LAB — T4 FREE SERPL-MCNC: 1.16 NG/DL (ref 0.76–1.46)

## 2019-12-04 PROCEDURE — 84439 ASSAY OF FREE THYROXINE: CPT

## 2019-12-04 PROCEDURE — 93797 PHYS/QHP OP CAR RHAB WO ECG: CPT

## 2019-12-04 PROCEDURE — 36415 COLL VENOUS BLD VENIPUNCTURE: CPT

## 2019-12-04 PROCEDURE — 93798 PHYS/QHP OP CAR RHAB W/ECG: CPT

## 2019-12-04 PROCEDURE — 40000575 ZZH STATISTIC OP CARDIAC VISIT #2

## 2019-12-04 PROCEDURE — 83835 ASSAY OF METANEPHRINES: CPT

## 2019-12-04 PROCEDURE — 40000116 ZZH STATISTIC OP CR VISIT

## 2019-12-05 ENCOUNTER — HOSPITAL ENCOUNTER (OUTPATIENT)
Dept: PALLIATIVE MEDICINE | Facility: OTHER | Age: 64
Discharge: HOME | End: 2019-12-05
Attending: ANESTHESIOLOGY

## 2019-12-05 DIAGNOSIS — M19.90 OSTEOARTHRITIS, UNSPECIFIED OSTEOARTHRITIS TYPE, UNSPECIFIED SITE: ICD-10-CM

## 2019-12-05 DIAGNOSIS — L40.50 PSORIATIC ARTHROPATHY (H): ICD-10-CM

## 2019-12-05 DIAGNOSIS — F41.1 ANXIETY STATE: ICD-10-CM

## 2019-12-06 ENCOUNTER — OFFICE VISIT - HEALTHEAST (OUTPATIENT)
Dept: PULMONOLOGY | Facility: OTHER | Age: 64
End: 2019-12-06

## 2019-12-06 DIAGNOSIS — J44.9 CHRONIC OBSTRUCTIVE PULMONARY DISEASE, UNSPECIFIED COPD TYPE (H): ICD-10-CM

## 2019-12-06 LAB
ANNOTATION COMMENT IMP: NORMAL
METANEPHS SERPL-SCNC: 0.12 NMOL/L (ref 0–0.49)
NORMETANEPHRINE SERPL-SCNC: 0.89 NMOL/L (ref 0–0.89)

## 2019-12-09 ENCOUNTER — COMMUNICATION - HEALTHEAST (OUTPATIENT)
Dept: PULMONOLOGY | Facility: OTHER | Age: 64
End: 2019-12-09

## 2019-12-09 DIAGNOSIS — J44.9 CHRONIC OBSTRUCTIVE PULMONARY DISEASE, UNSPECIFIED COPD TYPE (H): ICD-10-CM

## 2019-12-11 ENCOUNTER — DOCUMENTATION ONLY (OUTPATIENT)
Dept: SLEEP MEDICINE | Facility: CLINIC | Age: 64
End: 2019-12-11

## 2019-12-11 ENCOUNTER — OFFICE VISIT (OUTPATIENT)
Dept: SLEEP MEDICINE | Facility: CLINIC | Age: 64
End: 2019-12-11
Attending: INTERNAL MEDICINE
Payer: MEDICARE

## 2019-12-11 ENCOUNTER — RECORDS - HEALTHEAST (OUTPATIENT)
Dept: ADMINISTRATIVE | Facility: OTHER | Age: 64
End: 2019-12-11

## 2019-12-11 VITALS
OXYGEN SATURATION: 93 % | WEIGHT: 263 LBS | HEART RATE: 103 BPM | HEIGHT: 66 IN | SYSTOLIC BLOOD PRESSURE: 138 MMHG | RESPIRATION RATE: 20 BRPM | BODY MASS INDEX: 42.27 KG/M2 | DIASTOLIC BLOOD PRESSURE: 80 MMHG

## 2019-12-11 DIAGNOSIS — R06.02 SOB (SHORTNESS OF BREATH): ICD-10-CM

## 2019-12-11 DIAGNOSIS — I27.20 PULMONARY HYPERTENSION (H): ICD-10-CM

## 2019-12-11 DIAGNOSIS — G47.33 OSA (OBSTRUCTIVE SLEEP APNEA): Primary | ICD-10-CM

## 2019-12-11 DIAGNOSIS — E78.5 DYSLIPIDEMIA: ICD-10-CM

## 2019-12-11 DIAGNOSIS — F51.04 CHRONIC INSOMNIA: ICD-10-CM

## 2019-12-11 DIAGNOSIS — E61.1 IRON DEFICIENCY: ICD-10-CM

## 2019-12-11 DIAGNOSIS — G25.81 RESTLESS LEGS SYNDROME (RLS): ICD-10-CM

## 2019-12-11 DIAGNOSIS — G47.10 HYPERSOMNIA: ICD-10-CM

## 2019-12-11 PROCEDURE — 99205 OFFICE O/P NEW HI 60 MIN: CPT | Performed by: INTERNAL MEDICINE

## 2019-12-11 RX ORDER — PREDNISONE 20 MG/1
40 TABLET ORAL
COMMUNITY
Start: 2019-12-09 | End: 2019-12-14

## 2019-12-11 RX ORDER — BUPRENORPHINE 10 UG/H
PATCH, EXTENDED RELEASE TRANSDERMAL
Refills: 2 | COMMUNITY
Start: 2019-12-07 | End: 2021-01-07

## 2019-12-11 RX ORDER — DOXYCYCLINE 100 MG/1
100 CAPSULE ORAL
COMMUNITY
Start: 2019-12-09 | End: 2019-12-14

## 2019-12-11 ASSESSMENT — MIFFLIN-ST. JEOR: SCORE: 1749.46

## 2019-12-11 NOTE — LETTER
12/11/2019        RE: Randi Cleary  [address]  [facility] [address]        Chief complaint: Untreated sleep apnea and chronic insomnia    History of Present Illness: 64-year-old female recently diagnosed with pulmonary hypertension.  She was encouraged to get treated for her sleep apnea.  She was diagnosed in 2017 with severe sleep apnea [facility].  She reports using CPAP for maybe 6 months.  She had difficulty with mask fit and excessive leak.  Eventually she had so much leak in her eye that she developed eye complications and needed to stop CPAP.    Patient has a history of chronic pain and has been on narcotics for 25 years.  She is had some changes in her dosing recently and is now on buprenorphine.  She also has a history of restless leg syndrome and has taken pramipexole in the past.  Now she takes it as needed.  She is currently sleeping in the recliner because it is she is uncomfortable in the bed due to bilateral shoulder pain.  She is hoping to have some rotator cuff surgery in the new year and eventually get back into bed.      Typical bedtime is around 10 to 11 PM.  She reports it takes her 2 to 3 hours to fall asleep.  She wakes up multiple times at night due to pain or restlessness.  She may get up to go to the bathroom.  She has difficulty returning to sleep.  She typically gets out of bed for the day around 9 to 10 AM.  She takes naps up to 5 days a week 1 to 2 hours at a time.  She drinks about 4 caffeinated beverages a day mostly Coke with occasional coffee.  She has excessive sleepiness and is counseled on the absolute importance of pulling over should she become sleepy behind the wheel.  Restlessness is under good control at this time and she takes the Mirapex in the middle of the night if she develops restlessness that is impacting her ability to fall back asleep.  She occasionally has nightmares.  She has been told that she sleep talks a couple of times a week.  " No history of dream enactment behavior.  She wakes up with headaches rather frequently.  She snores without CPAP.  She has had weight gain since her last sleep study.  She is also had episodes of notbeing able to move upon awakening.    She is currently suffering from cough and wheezing and being treated for COPD exacerbation with prednisone.  She is also starting a cardiac rehab program.  She does not drink alcohol.  She admits she had a relapse in November but is sober currently.  She is interested in getting evaluated for possible medical marijuana for her pain and would like to get off of narcotics eventually.    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; H63D not detected     Hyperlipidaemia      Hypertension      Impaired fasting glucose 2017     Joint pain      Morbid obesity (H)      KYAW (obstructive sleep apnea) 2016     Osteopenia      Pheochromocytoma, left 08/02/2017    laparoscopically removed     Postablative hypothyroidism 1995     Psoriasis      Psoriatic arthropathy (H)      RLS (restless legs syndrome)      Sacroiliitis (H)      Snoring      Spinal stenosis      Vitamin B 12 deficiency 2009     Vitamin D deficiency 2010       Allergies   Allergen Reactions     Gabapentin      Drove on the wrong side of the highway     Levofloxacin      \"CAN'T REMEMBER\"     Penicillins      \"SORES IN MOUTH\"     Sulfa Drugs      \"PT DOES NOT KNOW WHAT THE REACTION WAS\"       Current Outpatient Medications   Medication     ASPIRIN PO "     benzonatate (TESSALON) 100 MG capsule     BUTRANS 10 MCG/HR WK patch     Cholecalciferol (VITAMIN D3) 25587 units TABS     Cyanocobalamin (VITAMIN B-12) 5000 MCG SUBL     Desvenlafaxine Succinate (PRISTIQ PO)     diclofenac (VOLTAREN) 1 % topical gel     doxycycline monohydrate (MONODOX) 100 MG capsule     furosemide (LASIX) 20 MG tablet     hydrOXYzine (VISTARIL) 25 MG capsule     levothyroxine (SYNTHROID/LEVOTHROID) 175 MCG tablet     losartan (COZAAR) 25 MG tablet     Omeprazole (PRILOSEC PO)     potassium chloride ER (K-DUR/KLOR-CON M) 20 MEQ CR tablet     Pramipexole Dihydrochloride (MIRAPEX PO)     predniSONE (DELTASONE) 20 MG tablet     albuterol (ACCUNEB) 0.63 MG/3ML neb solution     ANORO ELLIPTA 62.5-25 MCG/INH oral inhaler     No current facility-administered medications for this visit.        Social History     Socioeconomic History     Marital status:      Spouse name: Not on file     Number of children: Not on file     Years of education: Not on file     Highest education level: Not on file   Occupational History     Not on file   Social Needs     Financial resource strain: Not on file     Food insecurity:     Worry: Not on file     Inability: Not on file     Transportation needs:     Medical: Not on file     Non-medical: Not on file   Tobacco Use     Smoking status: Former Smoker     Packs/day: 2.50     Years: 20.00     Pack years: 50.00     Types: Cigarettes     Last attempt to quit: 2000     Years since quittin.4     Smokeless tobacco: Never Used   Substance and Sexual Activity     Alcohol use: No     Comment: relapse 2019 sober      Drug use: No     Sexual activity: Not on file   Lifestyle     Physical activity:     Days per week: Not on file     Minutes per session: Not on file     Stress: Not on file   Relationships     Social connections:     Talks on phone: Not on file     Gets together: Not on file     Attends Quaker service: Not on file     Active member of club or  "organization: Not on file     Attends meetings of clubs or organizations: Not on file     Relationship status: Not on file     Intimate partner violence:     Fear of current or ex partner: Not on file     Emotionally abused: Not on file     Physically abused: Not on file     Forced sexual activity: Not on file   Other Topics Concern     Parent/sibling w/ CABG, MI or angioplasty before 65F 55M? Not Asked   Social History Narrative     Not on file       Family History   Problem Relation Age of Onset     Lupus Sister      Hypertension Sister      Obesity Sister      Scleroderma Sister      Heart Failure Sister      Hemochromatosis Sister      Hemochromatosis Brother      Hypertension Brother      Hemochromatosis Father      Myocardial Infarction Father      Snoring Father      Obesity Mother      Thyroid Disease Mother      Adrenal Disorder No family hx of        Review of Systems: Positve for cataracts, needs surgery, lower extremity edema, orthopnea, psoriasis, dyspnea with exertion and rest, nausea and belly pain, muscle and joint aches particularly shoulders, and depression anxiety, and per HPI, otherwise comprehensive review of systems is negative.    EXAM: Fatigued appearing talkative female obese no distress  /80   Pulse 103   Resp 20   Ht 1.676 m (5' 5.98\")   Wt 119.3 kg (263 lb)   SpO2 93%   BMI 42.47 kg/m     HEENT: Normocephalic/atraumatic, pupils are equal, reactive to light, no scleral icterus  Oropharynx: Mallampati 1, tonsillar stage 0  Neck supple no lymphadenopathy, neck circumference 16.5 inches  Chest: Clear to auscultation bilaterally, no rales or wheezes  Cardiac: Regular rate and rhythm, S1-S2 normal  Abdomen: Obese, soft and nontender, bowel sounds present  Extremities: Warm, well perfused, no cyanosis clubbing or edema  Psychiatric: Mood and affect appear normal  Neurologic: No gross focal deficits    PSG date: 5/9/2017 [facility] sleep center [place]  Wt: 232 pounds, BMI 37.4 " [place] 15  AHI AHI 36.9  CPAP titrated from 5-11, CPAP pressures higher than 7 were effective at eliminating obstructive respiratory events but patient had borderline low oxygen saturations and low tidal volumes.  Bilevel trial initiated but was associated with central apneas  PLM's noted    PAP download from 5/24/2017 to 6/22/2017 reviewed:  Per cent of days used greater than 4 Hours 80 % (minimum goal greater than 70%)  Average use on days used: 5 hours 11 min  Settings: CPAP 11 cmH2O      Average AHI 3.0 events per hour (goal less than 5)  Leak intermittently excessive    ASSESSMENT: 64-year-old female with severe obstructive sleep apnea, obesity, pulmonary hypertension, COPD with mild airflow obstruction on PFTs, chronic insomnia.  She also suffers chronic pain with chronic opioid use that can complicate sleep disordered breathing (CSA, hypoventilation).  Does not appear to have centrals on most recent download.  Now that she is sleeping in a recliner her pressure requirements are likely less than when in bed.    PLAN: Discussed the option of doing a repeat sleep evaluation however given her changes in body position with sleep potentially getting back into bed in the next few months we will hold off.  Proceed with dropping lower limit pressure on her settings to 7 keeping upper limit of 11.  Patient should use CPAP every night.  She is can continue to sleep in her recliner.  She needs to find optimal fitting mask and get assistance for with DME to minimize leak.  We will attempt to enroll her in the sleep therapy management program.    Patient was extensively counseled regarding management of her sleep schedule to optimize sleep consolidation.  She was also offered cognitive behavioral therapy for insomnia however she is declining that at this time.  She is agreeable to limiting all naps.  Avoid going to bed before midnight and keeping your regular rise time at 8 AM.  She is encouraged to get natural light  exposure upon awakening.  Please see patient instructions for further details of counseling provided.  She continue to work on weight loss.  Follow-up in this clinic in 8 weeks.  If were able to find optimal settings by AHI would recommend overnight oximetry on low settings to confirm adequate treatment of hypoxemia.    Continue pramipexole as needed at this time.    Sixty minutes spent with patient, >50% spent counseling and coordinating care.      Sharmila Gregory M.D.  Pulmonary/Critical Care/Sleep Medicine    [facility]   Floor 1, [address]   [address] [place] [address]. [place] [address]   Appointments: [phone]    The above note was dictated using voice recognition software and may include typographical errors. Please contact the author for any clarifications.                Sincerely,        Sharmila Gregory MD

## 2019-12-11 NOTE — PROGRESS NOTES
Patient is an STM entry per provider request.    She uses [facility] for her current DME.   She has an account in the outside vendor [employer] account

## 2019-12-11 NOTE — PROGRESS NOTES
Chief complaint: Untreated sleep apnea and chronic insomnia    History of Present Illness: 64-year-old female recently diagnosed with pulmonary hypertension.  She was encouraged to get treated for her sleep apnea.  She was diagnosed in 2017 with severe sleep apnea [facility].  She reports using CPAP for maybe 6 months.  She had difficulty with mask fit and excessive leak.  Eventually she had so much leak in her eye that she developed eye complications and needed to stop CPAP.    Patient has a history of chronic pain and has been on narcotics for 25 years.  She is had some changes in her dosing recently and is now on buprenorphine.  She also has a history of restless leg syndrome and has taken pramipexole in the past.  Now she takes it as needed.  She is currently sleeping in the recliner because it is she is uncomfortable in the bed due to bilateral shoulder pain.  She is hoping to have some rotator cuff surgery in the new year and eventually get back into bed.      Typical bedtime is around 10 to 11 PM.  She reports it takes her 2 to 3 hours to fall asleep.  She wakes up multiple times at night due to pain or restlessness.  She may get up to go to the bathroom.  She has difficulty returning to sleep.  She typically gets out of bed for the day around 9 to 10 AM.  She takes naps up to 5 days a week 1 to 2 hours at a time.  She drinks about 4 caffeinated beverages a day mostly Coke with occasional coffee.  She has excessive sleepiness and is counseled on the absolute importance of pulling over should she become sleepy behind the wheel.  Restlessness is under good control at this time and she takes the Mirapex in the middle of the night if she develops restlessness that is impacting her ability to fall back asleep.  She occasionally has nightmares.  She has been told that she sleep talks a couple of times a week.  No history of dream enactment behavior.  She wakes up with headaches rather frequently.  She  "snores without CPAP.  She has had weight gain since her last sleep study.  She is also had episodes of notbeing able to move upon awakening.    She is currently suffering from cough and wheezing and being treated for COPD exacerbation with prednisone.  She is also starting a cardiac rehab program.  She does not drink alcohol.  She admits she had a relapse in November but is sober currently.  She is interested in getting evaluated for possible medical marijuana for her pain and would like to get off of narcotics eventually.    Total score - [place]: 14 (12/11/2019 10:27 AM) (Less than 10 normal)    TEVIN Total Score: 24 (normal 0-7, mild 8-14, moderate 15-21, severe 22-28)      Past Medical History:   Diagnosis Date     Anxiety      Bipolar disorder (H)      Breast cancer (H) 1986    lumpectomy, radiation, chemo     COPD (chronic obstructive pulmonary disease) (H)     asthma     Depression, major, recurrent (H)      Drug tolerance     opioid     Esophageal reflux      Fatigue      Graves disease 1994     Hemochromatosis 02/14/2018    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; H63D not detected     Hyperlipidaemia      Hypertension      Impaired fasting glucose 2017     Joint pain      Morbid obesity (H)      KYAW (obstructive sleep apnea) 2016     Osteopenia      Pheochromocytoma, left 08/02/2017    laparoscopically removed     Postablative hypothyroidism 1995     Psoriasis      Psoriatic arthropathy (H)      RLS (restless legs syndrome)      Sacroiliitis (H)      Snoring      Spinal stenosis      Vitamin B 12 deficiency 2009     Vitamin D deficiency 2010       Allergies   Allergen Reactions     Gabapentin      Drove on the wrong side of the highway     Levofloxacin      \"CAN'T REMEMBER\"     Penicillins      \"SORES IN MOUTH\"     Sulfa Drugs      \"PT DOES NOT KNOW WHAT THE REACTION WAS\"       Current Outpatient Medications   Medication     ASPIRIN PO     benzonatate (TESSALON) 100 MG capsule     BUTRANS 10 MCG/HR WK patch     Cholecalciferol " (VITAMIN D3) 60176 units TABS     Cyanocobalamin (VITAMIN B-12) 5000 MCG SUBL     Desvenlafaxine Succinate (PRISTIQ PO)     diclofenac (VOLTAREN) 1 % topical gel     doxycycline monohydrate (MONODOX) 100 MG capsule     furosemide (LASIX) 20 MG tablet     hydrOXYzine (VISTARIL) 25 MG capsule     levothyroxine (SYNTHROID/LEVOTHROID) 175 MCG tablet     losartan (COZAAR) 25 MG tablet     Omeprazole (PRILOSEC PO)     potassium chloride ER (K-DUR/KLOR-CON M) 20 MEQ CR tablet     Pramipexole Dihydrochloride (MIRAPEX PO)     predniSONE (DELTASONE) 20 MG tablet     albuterol (ACCUNEB) 0.63 MG/3ML neb solution     ANORO ELLIPTA 62.5-25 MCG/INH oral inhaler     No current facility-administered medications for this visit.        Social History     Socioeconomic History     Marital status:      Spouse name: Not on file     Number of children: Not on file     Years of education: Not on file     Highest education level: Not on file   Occupational History     Not on file   Social Needs     Financial resource strain: Not on file     Food insecurity:     Worry: Not on file     Inability: Not on file     Transportation needs:     Medical: Not on file     Non-medical: Not on file   Tobacco Use     Smoking status: Former Smoker     Packs/day: 2.50     Years: 20.00     Pack years: 50.00     Types: Cigarettes     Last attempt to quit: 2000     Years since quittin.4     Smokeless tobacco: Never Used   Substance and Sexual Activity     Alcohol use: No     Comment: relapse 2019 sober      Drug use: No     Sexual activity: Not on file   Lifestyle     Physical activity:     Days per week: Not on file     Minutes per session: Not on file     Stress: Not on file   Relationships     Social connections:     Talks on phone: Not on file     Gets together: Not on file     Attends Latter-day service: Not on file     Active member of club or organization: Not on file     Attends meetings of clubs or organizations: Not on file      "Relationship status: Not on file     Intimate partner violence:     Fear of current or ex partner: Not on file     Emotionally abused: Not on file     Physically abused: Not on file     Forced sexual activity: Not on file   Other Topics Concern     Parent/sibling w/ CABG, MI or angioplasty before 65F 55M? Not Asked   Social History Narrative     Not on file       Family History   Problem Relation Age of Onset     Lupus Sister      Hypertension Sister      Obesity Sister      Scleroderma Sister      Heart Failure Sister      Hemochromatosis Sister      Hemochromatosis Brother      Hypertension Brother      Hemochromatosis Father      Myocardial Infarction Father      Snoring Father      Obesity Mother      Thyroid Disease Mother      Adrenal Disorder No family hx of        Review of Systems: Positve for cataracts, needs surgery, lower extremity edema, orthopnea, psoriasis, dyspnea with exertion and rest, nausea and belly pain, muscle and joint aches particularly shoulders, and depression anxiety, and per HPI, otherwise comprehensive review of systems is negative.    EXAM: Fatigued appearing talkative female obese no distress  /80   Pulse 103   Resp 20   Ht 1.676 m (5' 5.98\")   Wt 119.3 kg (263 lb)   SpO2 93%   BMI 42.47 kg/m    HEENT: Normocephalic/atraumatic, pupils are equal, reactive to light, no scleral icterus  Oropharynx: Mallampati 1, tonsillar stage 0  Neck supple no lymphadenopathy, neck circumference 16.5 inches  Chest: Clear to auscultation bilaterally, no rales or wheezes  Cardiac: Regular rate and rhythm, S1-S2 normal  Abdomen: Obese, soft and nontender, bowel sounds present  Extremities: Warm, well perfused, no cyanosis clubbing or edema  Psychiatric: Mood and affect appear normal  Neurologic: No gross focal deficits    PSG date: 5/9/2017 [facility] sleep center [place]  Wt: 232 pounds, BMI 37.4 [place] 15  AHI AHI 36.9  CPAP titrated from 5-11, CPAP pressures higher than 7 were effective " at eliminating obstructive respiratory events but patient had borderline low oxygen saturations and low tidal volumes.  Bilevel trial initiated but was associated with central apneas  PLM's noted    PAP download from 5/24/2017 to 6/22/2017 reviewed:  Per cent of days used greater than 4 Hours 80 % (minimum goal greater than 70%)  Average use on days used: 5 hours 11 min  Settings: CPAP 11 cmH2O      Average AHI 3.0 events per hour (goal less than 5)  Leak intermittently excessive    ASSESSMENT: 64-year-old female with severe obstructive sleep apnea, obesity, pulmonary hypertension, COPD with mild airflow obstruction on PFTs, chronic insomnia.  She also suffers chronic pain with chronic opioid use that can complicate sleep disordered breathing (CSA, hypoventilation).  Does not appear to have centrals on most recent download.  Now that she is sleeping in a recliner her pressure requirements are likely less than when in bed.    PLAN: Discussed the option of doing a repeat sleep evaluation however given her changes in body position with sleep potentially getting back into bed in the next few months we will hold off.  Proceed with dropping lower limit pressure on her settings to 7 keeping upper limit of 11.  Patient should use CPAP every night.  She is can continue to sleep in her recliner.  She needs to find optimal fitting mask and get assistance for with DME to minimize leak.  We will attempt to enroll her in the sleep therapy management program.    Patient was extensively counseled regarding management of her sleep schedule to optimize sleep consolidation.  She was also offered cognitive behavioral therapy for insomnia however she is declining that at this time.  She is agreeable to limiting all naps.  Avoid going to bed before midnight and keeping your regular rise time at 8 AM.  She is encouraged to get natural light exposure upon awakening.  Please see patient instructions for further details of counseling  provided.  She continue to work on weight loss.  Follow-up in this clinic in 8 weeks.  If were able to find optimal settings by AHI would recommend overnight oximetry on low settings to confirm adequate treatment of hypoxemia.    Continue pramipexole as needed at this time.    Sixty minutes spent with patient, >50% spent counseling and coordinating care.      Sharmila Gregory M.D.  Pulmonary/Critical Care/Sleep Medicine    [facility]   Floor 1, [address]   [address] [place], [place] [address] [address]   Appointments: [phone]    The above note was dictated using voice recognition software and may include typographical errors. Please contact the author for any clarifications.

## 2019-12-11 NOTE — PATIENT INSTRUCTIONS
No Naps   Do not go to bed until Midnight, get out of bed at 8 AM   Get natural light in the AM   Use CPAP every time you sleep   Respond to the CPAP coaches  [place] [facility]      Treating Insomnia  Good sleeping habits are a key part of treatment. If needed, some medications may help you sleep better at first. Making healthy lifestyle changes and learning to relax can improve your sleep. Treating insomnia takes commitment, but trust that your efforts will pay off. Talk to your doctor before taking any medication.    Healthy Lifestyle  Your lifestyle affects your health and your sleep. Here are some healthy habits:    Keep a regular sleep schedule. Go to bed and get up at the same time each day.    Exercise regularly. It may help you reduce stress. Avoid strenuous exercise for two to four hours before bedtime.    Avoid or limit naps.    Use your bed only for sleep and sex.    Don t spend too much time in bed trying to fall asleep. If you can t fall asleep, get up and do something until you become tired and drowsy.    Avoid or limit caffeine and nicotine. They can keep you awake at night. Also avoid alcohol. It may help you fall asleep at first, but your sleep will not be restful.    Before Bedtime  To sleep better every night, try these tips:    Have a bedtime routine to let your body and mind know when it s time to sleep.    Going to bed should be relaxing so try to do only relaxing things around bedtime. Sleep will come sooner.    If your worries don t let you sleep, write them down in a diary. Then close it, and go to bed.    Make sure the room is not too hot or too cold. If it s not dark enough, an eye mask can help. If it s noisy, try using earplugs.    Learn to Relax  Stress, anxiety, and body tension may keep you awake at night. To unwind before bedtime, try reading a book, meditation, or yoga. Also, try the following:    Deep breathing. Sit or lie back in a chair. Take a slow, deep breath. Hold it  for 5 counts. Then breathe out slowly through your mouth. Keep doing this until you feel relaxed.    Imagery. Think of the last fun trip you took. In your mind, walk through the trip from start to finish. Put as much detail into the memory as you can remember. It will help you relax.    Cognitive Behavioral Treatment (CBT)  CBT is the most effective treatment for long-term insomnia. It tries to address the underlying causes of your sleep problems, including your habits and how you think about sleep.      Individual Therapy   Roland Gregorio, PHD        Suggested Resources  Insomnia Treatment Books     Overcoming Insomnia by Zain Boggs and Magalys Alonso (2008)    No More Sleepless Nights by Gab Kan and Carolina Richards (1996)    Say [place] to Insomnia by Leonel Espinoza (2009)    The Insomnia Workbook by Brynn Galindo and Elías Modi (2009)    The Insomnia Answer by Oc Mcintyre and Jonathon Covington (2006)      Stress Management and Relaxation Books    The Relaxation and Stress Reduction Workbook by Giovana Norton, Angela Dyer and Giorgi Casas (2008)    Stress Management Workbook: Techniques and Self-Assessment Procedures by Lisseth Cervantes and Alex Griffiths (1997)    A Mindfulness-Based Stress Reduction Workbook by Ryne Curiel and Loretta Frye (2010)    The Complete Stress Management Workbook by Elmer Moraes, Herman Munroe and Richard Caraballo (1996)    Assert Yourself by Alicia Purcell and Primo Purcell (1977)    Relaxation Resources for Computer Download   These websites offer resources to help you relax. This list is for information only. [facility] is not responsible for the quality of services or the actions of any person or organization.  Progressive Muscle Relaxation (PMR):     http://www.innereyetoko.com/progressive-muscle-relaxation-exercise.html     http://studentsupport.[facility]/counseling/resources/self-help/relaxation-and-stress-management/    Deep Breathing Exercises:    http://www.[employer].ChipRewards/breathing-awareness.html     Meditation:     www.VM Discovery    www.theSilego Technologyguided-meditation-site.com You may have to pay for some of these resources.    Guided Imagery:    http://www.Sprout/guided-imagery-scripts.html     http://ANTERIOS/library/vluisjvxri-jmtodl-hgxuhrv/     Counseling / Behavioral Health  [facility]  Visit [email or web address] or call [phone] to find a clinic close to you.      This is not a prescription and these resources are optional. You must pay for any costs when using these resources. Please ask your insurance carrier if you can be reimbursed for these resources. If so, you are responsible for sending the needed details to your insurance carrier. These resources may also be tax deductible as medical expenses. Check with your .     These programs and publications are not affiliated in any way with [facility].    Your BMI is Body mass index is 42.47 kg/m .  Weight management is a personal decision.  If you are interested in exploring weight loss strategies, the following discussion covers the approaches that may be successful. Body mass index (BMI) is one way to tell whether you are at a healthy weight, overweight, or obese. It measures your weight in relation to your height.  A BMI of 18.5 to 24.9 is in the healthy range. A person with a BMI of 25 to 29.9 is considered overweight, and someone with a BMI of 30 or greater is considered obese. More than two-thirds of American adults are considered overweight or obese.  Being overweight or obese increases the risk for further weight gain. Excess weight may lead to heart disease and diabetes.  Creating and following plans for healthy eating and physical activity may help you improve your health.  Weight control is part of healthy lifestyle and includes exercise, emotional health, and healthy eating habits. Careful  eating habits lifelong are the mainstay of weight control. Though there are significant health benefits from weight loss, long-term weight loss with diet alone may be very difficult to achieve- studies show long-term success with dietary management in less than 10% of people. Attaining a healthy weight may be especially difficult to achieve in those with severe obesity. In some cases, medications, devices and surgical management might be considered.  What can you do?  If you are overweight or obese and are interested in methods for weight loss, you should discuss this with your provider.     Consider reducing daily calorie intake by 500 calories.     Keep a food journal.     Avoiding skipping meals, consider cutting portions instead.    Diet combined with exercise helps maintain muscle while optimizing fat loss. Strength training is particularly important for building and maintaining muscle mass. Exercise helps reduce stress, increase energy, and improves fitness. Increasing exercise without diet control, however, may not burn enough calories to loose weight.       Start walking three days a week 10-20 minutes at a time    Work towards walking thirty minutes five days a week     Eventually, increase the speed of your walking for 1-2 minutes at time    In addition, we recommend that you review healthy lifestyles and methods for weight loss available through the National Institutes of Health patient information sites:  http://win.niddk.nih.gov/publications/index.htm    And look into health and wellness programs that may be available through your health insurance provider, employer, local community center, or leola club.    Weight management plan: Patient was referred to their PCP to discuss a diet and exercise plan.    1.  Continue CPAP every night for all hours that you are sleeping.  If you nap use CPAP.  As always, try to get at least 8 hours of sleep or more each day, and avoid sleep deprivation. Avoid  alcohol.    2.  Reasons that you might need a change to your pressure therapy would be weight gain or loss, waking having inadvertently removed your CPAP overnight, having previously felt refreshed by sleep with CPAP use and now waking un-refreshed, and return of daytime sleepiness. Also, the development of new medical problems can sometimes affect breathing at night-heart failure, stroke, medications such as narcotics.    3.  Please bring CPAP with you if you are hospitalized.  If anticipating surgery be sure to discuss with your surgeon that you have sleep apnea and use PAP therapy.      4.  Maintain your equipment as recommended which includes routine cleaning and replacement of supplies.  Call DME for any questions regarding supplies or maintenance.      5.  Do not drive on engage in potentially dangerous activities if feeling sleepy.    6.  Please see me again in 6 weeks and bring your machine and card to your follow-up visit.      [facility] and cpap specialist [phone]  [facility] [phone]    [facility] [phone], CPAP specialist [phone]  [facility] [phone]    [facility], [facility] [phone]    [facility]  Josephine Messer [phone]  [facility] [place] [facility] [place] [phone]  [facility] phone [phone]         Tips for your CPAP and BIPAP use-    Mask fitting tips  Mask fitting exercise:    To improve your mask seal and your mobility at night, put mask on and secure in place.  Lie down in bed with full pressure and roll to one side, adjust headgear while in that position to eliminate any leaks. Repeat process rolling to other side.     The mask seal does not have to be perfect:   CPAP machines are designed to make up for small leaks. However, you will not tolerate leaks blowing  in your eyes so you will need to adjust.   Any leak should only be near or at the bottom of the mask.  We expect your mask to leak slightly at night.    Do not over-tighten the headgear straps, tighter IS NOT better, we expect minimal leak.    First try re-positioning the mask or headgear before tightening the headgear straps.  Mask leaks are expected due to changing sleeping positions. Try pulling the mask away from your skin allowing the cushion to re-inflate will minimize the leak.  If you struggle for a good fit, try turning the CPAP off and then readjust the mask by pulling it away from your face and then turning back on the CPAP.        Humidifier tips  Humidifiers can be adjusted to increase or decrease the amount of moisture according to your comfort level. You may need to adjust this frequently at first, but then might only change it with seasonal weather changes.     Try INCREASING the humidity if:  You experience a dry, irritated nasal passage or throat.  You have a runny, drippy nose or sneezing fits after using CPAP.  You experience nasal congestion during or after CPAP use.    Try DECREASING the humidity if:  You have excessive condensation or  rain out  in the tubing or mask.  Otherwise keep the tubing warm during the night by running it underneath the blankets or pillow.      Clinic visit after initial CPAP and BIPAP set-up   Bring your equipment with you to your 4 week follow up clinic visit.  We will be extracting your data from the machine.        Travel  Always take your equipment with you.  If you fly with your equipment bring it on with you as a carry on.  Medical equipment does not count as a carry on.    If you travel international the machines take 110-240.  The only adapter needed is the adapter that will fit into the receptacle (outlet).    You may also want to bring an extension cord as many hotel rooms have limited outlets at the bedside.  Do not travel with water in your humidifier  chamber.     Cleaning and Maintenance Guidelines    Equipment Frequency Cleaning Method   Mask First Day    Daily      Weekly Soak mask in hot soapy water for 30 minutes, rinse and air dry.  Wipe nasal cushion with a hot soapy (Ivory, baby shampoo) cloth and rinse.  Baby wipes may also be used.  Do not use anti-bacterial soaps,Amanda  liquid soap, rubbing alcohol, bleach or ammonia.  Wash frame in hot soapy water (Ivory, baby shampoo) rinse and let air dry   Headgear Biweekly Wash in hot soapy water, rinse and air dry   Reusable Gray Filter Weekly Wash in hot soapy water, rinse, put in towel squeeze moisture out, let air dry   Disposable White Filter Check Weekly Replace when brown or gray in color; at least every 2 to 3 months   Humidifier Chamber Daily    Weekly Empty distilled water from humidifier and let air dry    Hand wash in hot soapy water, rinse and air dry   Tubing Weekly Wash in hot soapy water, rinse and let air dry   Mask, Tubing and Humidifier Chamber As needed Disinfect: Soak in 1 part vinegar to 3 parts hot water for 30 minutes, rinse well and air dry  Not the material headgear        MASK AND SUPPLY REORDERING  Reminder: Most insurance companies will allow for a new mask, headgear, tubing, and reusable gray filter every six months.  Disposable white ultra-fine filters are covered monthly.    EQUIPMENT NEEDS  Please call our CPAP specialist with any equipment problems, questions or needs.    HOME AND SAFETY INSTRUCTIONS    Do not use frayed or cracked electrical cords, multi plug adaptors, or switched receptacles    Do not immerse electrical equipment into water    Assure that electrical cords do not become a tripping hazard

## 2019-12-12 ENCOUNTER — DOCUMENTATION ONLY (OUTPATIENT)
Dept: SLEEP MEDICINE | Facility: CLINIC | Age: 64
End: 2019-12-12

## 2019-12-12 NOTE — PROGRESS NOTES
PT STARTED WITH TRANSFER PROCESS BACK IN 2017 BUT SHE NEVER RESPONDED TO OUR CALLS, SENT MESSAGE TO TRANSFER TEAM TO REACH OUT TO THE PT TO SEE IF THEY WANT TO COME TO [facility].

## 2019-12-13 ENCOUNTER — HOSPITAL ENCOUNTER (OUTPATIENT)
Dept: CARDIAC REHAB | Facility: CLINIC | Age: 64
End: 2019-12-13
Attending: NURSE PRACTITIONER
Payer: MEDICARE

## 2019-12-13 ENCOUNTER — COMMUNICATION - HEALTHEAST (OUTPATIENT)
Dept: PULMONOLOGY | Facility: OTHER | Age: 64
End: 2019-12-13

## 2019-12-13 DIAGNOSIS — J44.9 CHRONIC OBSTRUCTIVE PULMONARY DISEASE, UNSPECIFIED COPD TYPE (H): ICD-10-CM

## 2019-12-13 PROCEDURE — 93798 PHYS/QHP OP CAR RHAB W/ECG: CPT

## 2019-12-13 PROCEDURE — 40000116 ZZH STATISTIC OP CR VISIT

## 2019-12-17 ENCOUNTER — DOCUMENTATION ONLY (OUTPATIENT)
Dept: SLEEP MEDICINE | Facility: CLINIC | Age: 64
End: 2019-12-17
Payer: MEDICARE

## 2019-12-17 NOTE — PROGRESS NOTES
3 DAY STM VISIT    Diagnostic AHI: 36.6 PSG    Patient contacted for 3 day STM visit.  Message left for patient to return call    Replacement device: No    STM ordered by provider: Yes     Device type: Auto-CPAP  PAP settings from order::  CPAP min 7 cm  H20       CPAP max 11 cm  H20  Mask type:    Nasal Mask     Device settings from machine  Assessment: Unable to find data.  Action plan: Patient to have 14 day STM visit. Patient has a follow up visit scheduled:   yes within 61-90 days of set up.    Total time spent on accessing, reviewing and interpreting remote patient PAP therapy data:   11 minutes      Total time spent with direct patient communication :   1 minutes

## 2019-12-18 DIAGNOSIS — E78.5 DYSLIPIDEMIA: ICD-10-CM

## 2019-12-20 ENCOUNTER — HOSPITAL ENCOUNTER (OUTPATIENT)
Dept: CARDIAC REHAB | Facility: CLINIC | Age: 64
End: 2019-12-20
Attending: NURSE PRACTITIONER
Payer: MEDICARE

## 2019-12-20 ENCOUNTER — COMMUNICATION - HEALTHEAST (OUTPATIENT)
Dept: INTERNAL MEDICINE | Facility: CLINIC | Age: 64
End: 2019-12-20

## 2019-12-20 DIAGNOSIS — J44.1 CHRONIC OBSTRUCTIVE PULMONARY DISEASE WITH ACUTE EXACERBATION (H): ICD-10-CM

## 2019-12-20 PROCEDURE — 93798 PHYS/QHP OP CAR RHAB W/ECG: CPT

## 2019-12-20 PROCEDURE — 40000116 ZZH STATISTIC OP CR VISIT

## 2019-12-23 RX ORDER — LOSARTAN POTASSIUM 25 MG/1
TABLET ORAL
Qty: 90 TABLET | Refills: 1 | Status: SHIPPED | OUTPATIENT
Start: 2019-12-23 | End: 2020-03-12

## 2019-12-27 DIAGNOSIS — E78.5 DYSLIPIDEMIA: ICD-10-CM

## 2019-12-31 ENCOUNTER — DOCUMENTATION ONLY (OUTPATIENT)
Dept: SLEEP MEDICINE | Facility: CLINIC | Age: 64
End: 2019-12-31
Payer: MEDICARE

## 2019-12-31 RX ORDER — LOSARTAN POTASSIUM 25 MG/1
TABLET ORAL
Qty: 30 TABLET | Refills: 1 | OUTPATIENT
Start: 2019-12-31

## 2019-12-31 NOTE — PROGRESS NOTES
14  DAY STM VISIT    Diagnostic AHI: 36.6 PSG    Subjective measures:   Patient interested in switching to [facility]. Right now she has [facility] and is not using her machine. Patient interested in Insomnia program.     Assessment: Pt not meeting objective benchmarks for compliance  Patient meeting subjective benchmarks.     Action plan: pt to have 30 day STM visit.      Device type: Auto-CPAP    PAP settings: CPAP min 7 cm  H20       CPAP max 11 cm  H20    Mask type:  Nasal Mask    Objective measures: 14 day rolling measures       Objective measure goal  Compliance   Goal >70%  Leak   Goal < 24 lpm  AHI  Goal < 5  Usage  Goal >240      Total time spent on accessing, reviewing and interpreting remote patient PAP therapy data:   10 minutes      Total time spent with direct patient communication :   6 minutes

## 2020-01-03 ENCOUNTER — OFFICE VISIT - HEALTHEAST (OUTPATIENT)
Dept: PALLIATIVE MEDICINE | Facility: OTHER | Age: 65
End: 2020-01-03

## 2020-01-03 ENCOUNTER — HOSPITAL ENCOUNTER (OUTPATIENT)
Dept: CARDIAC REHAB | Facility: CLINIC | Age: 65
End: 2020-01-03
Attending: NURSE PRACTITIONER
Payer: MEDICARE

## 2020-01-03 DIAGNOSIS — M25.551 PAIN IN JOINT INVOLVING RIGHT PELVIC REGION AND THIGH: ICD-10-CM

## 2020-01-03 PROCEDURE — 93798 PHYS/QHP OP CAR RHAB W/ECG: CPT

## 2020-01-03 PROCEDURE — 40000116 ZZH STATISTIC OP CR VISIT

## 2020-01-06 ENCOUNTER — COMMUNICATION - HEALTHEAST (OUTPATIENT)
Dept: PALLIATIVE MEDICINE | Facility: OTHER | Age: 65
End: 2020-01-06

## 2020-01-06 ENCOUNTER — HOSPITAL ENCOUNTER (OUTPATIENT)
Dept: CARDIAC REHAB | Facility: CLINIC | Age: 65
End: 2020-01-06
Attending: NURSE PRACTITIONER
Payer: MEDICARE

## 2020-01-06 DIAGNOSIS — M25.551 PAIN IN JOINT INVOLVING RIGHT PELVIC REGION AND THIGH: ICD-10-CM

## 2020-01-06 PROCEDURE — 93798 PHYS/QHP OP CAR RHAB W/ECG: CPT

## 2020-01-06 PROCEDURE — 40000116 ZZH STATISTIC OP CR VISIT

## 2020-01-08 ENCOUNTER — HOSPITAL ENCOUNTER (OUTPATIENT)
Dept: CARDIAC REHAB | Facility: CLINIC | Age: 65
End: 2020-01-08
Attending: NURSE PRACTITIONER
Payer: MEDICARE

## 2020-01-08 PROCEDURE — 40000575 ZZH STATISTIC OP CARDIAC VISIT #2

## 2020-01-08 PROCEDURE — 93797 PHYS/QHP OP CAR RHAB WO ECG: CPT | Mod: 59

## 2020-01-08 PROCEDURE — 40000116 ZZH STATISTIC OP CR VISIT

## 2020-01-08 PROCEDURE — 93798 PHYS/QHP OP CAR RHAB W/ECG: CPT

## 2020-01-10 ENCOUNTER — HOSPITAL ENCOUNTER (OUTPATIENT)
Dept: CARDIAC REHAB | Facility: CLINIC | Age: 65
End: 2020-01-10
Attending: NURSE PRACTITIONER
Payer: MEDICARE

## 2020-01-10 PROCEDURE — 40000116 ZZH STATISTIC OP CR VISIT

## 2020-01-10 PROCEDURE — 93798 PHYS/QHP OP CAR RHAB W/ECG: CPT

## 2020-01-14 ENCOUNTER — DOCUMENTATION ONLY (OUTPATIENT)
Dept: SLEEP MEDICINE | Facility: CLINIC | Age: 65
End: 2020-01-14

## 2020-01-14 NOTE — PROGRESS NOTES
30 DAY [facility] VISIT    Diagnostic AHI: 36.6   PSG    Data only recheck pt is in process of switching to [facility].  She still is not using her device from [facility].      Assessment: Pt not meeting objective benchmarks for compliance     Action plan: 2 week STM recheck appt scheduled  Patient has scheduled a follow up visit with Dr. Gregory on 2/6/2020.   Device type: Auto-CPAP  PAP settings:    Mask type:  Nasal Mask        Total time spent on accessing, reviewing and interpreting remote patient PAP therapy data:   10 minutes

## 2020-01-17 ENCOUNTER — HOSPITAL ENCOUNTER (OUTPATIENT)
Dept: CARDIAC REHAB | Facility: CLINIC | Age: 65
End: 2020-01-17
Attending: NURSE PRACTITIONER
Payer: MEDICARE

## 2020-01-17 PROCEDURE — 93798 PHYS/QHP OP CAR RHAB W/ECG: CPT

## 2020-01-17 PROCEDURE — 40000116 ZZH STATISTIC OP CR VISIT

## 2020-01-20 ENCOUNTER — HOSPITAL ENCOUNTER (OUTPATIENT)
Dept: CARDIAC REHAB | Facility: CLINIC | Age: 65
End: 2020-01-20
Attending: NURSE PRACTITIONER
Payer: MEDICARE

## 2020-01-20 PROCEDURE — 40000116 ZZH STATISTIC OP CR VISIT

## 2020-01-20 PROCEDURE — 93798 PHYS/QHP OP CAR RHAB W/ECG: CPT

## 2020-01-22 ENCOUNTER — HOSPITAL ENCOUNTER (OUTPATIENT)
Dept: CARDIAC REHAB | Facility: CLINIC | Age: 65
End: 2020-01-22
Attending: NURSE PRACTITIONER
Payer: MEDICARE

## 2020-01-22 PROCEDURE — 40000116 ZZH STATISTIC OP CR VISIT

## 2020-01-22 PROCEDURE — 93798 PHYS/QHP OP CAR RHAB W/ECG: CPT

## 2020-01-24 ENCOUNTER — HOSPITAL ENCOUNTER (OUTPATIENT)
Dept: CARDIAC REHAB | Facility: CLINIC | Age: 65
End: 2020-01-24
Attending: NURSE PRACTITIONER
Payer: MEDICARE

## 2020-01-24 PROCEDURE — 40000116 ZZH STATISTIC OP CR VISIT

## 2020-01-24 PROCEDURE — 93798 PHYS/QHP OP CAR RHAB W/ECG: CPT

## 2020-01-28 ENCOUNTER — DOCUMENTATION ONLY (OUTPATIENT)
Dept: SLEEP MEDICINE | Facility: CLINIC | Age: 65
End: 2020-01-28

## 2020-01-28 NOTE — PROGRESS NOTES
STM recheck     Diagnostic AHI: 36.6     Per notes in [facility]  Pt still has not transferred care to [facility] she was not going to use her device from [facility] as she wanted to change to [facility].  She has a follow up visit scheduled on 2/6/20 with Dr. Gregory.    Discussed changing follow up appt and pushing it out further due to her not using the machine.   She will be meeting with [facility] in the near furture.      Assessment: Pt not meeting objective benchmarks for compliance       Action plan: Follow up visit rescheduled for March.  Pt will call virtual care coordinator once she starts using her device again, so we can help  her    Device type: Auto-CPAP    PAP       Objective measure goal  Compliance   Goal >70%  Leak   Goal < 24 lpm  AHI  Goal < 5  Usage  Goal >240      Total time spent on accessing, reviewing and interpreting remote patient PAP therapy data:   0 minutes      Total time spent with direct patient communication :   15 minutes

## 2020-01-31 ENCOUNTER — TELEPHONE (OUTPATIENT)
Dept: CARDIOLOGY | Facility: CLINIC | Age: 65
End: 2020-01-31

## 2020-01-31 DIAGNOSIS — R06.02 SOB (SHORTNESS OF BREATH): ICD-10-CM

## 2020-01-31 DIAGNOSIS — I27.20 PULMONARY HYPERTENSION (H): Primary | ICD-10-CM

## 2020-01-31 NOTE — TELEPHONE ENCOUNTER
"Called patient back regarding patient's cardiac rehab. Patient is having concerns with making her appointments and \"doing everything\" asked of her during those appointments. Patient continues to have shoulder pain and continues to feel \"exhaused\" after these sessions. She is scheduled for rehab 3x/week. I advised that the patient cut down to 1x/week for a few weeks until she builds up stamina and then go up to 2x/week. I suggested the patient not stop completely. Scheduled patient for a f/u appointment with Dr. Edwards on March 18th at noon with labs at 11:30 AM. Patient verbalized understanding and did not have further questions. Bindu Walden RN on 1/31/2020 at 8:55 AM    "

## 2020-02-05 ENCOUNTER — HOSPITAL ENCOUNTER (OUTPATIENT)
Dept: CARDIAC REHAB | Facility: CLINIC | Age: 65
End: 2020-02-05
Attending: NURSE PRACTITIONER
Payer: MEDICARE

## 2020-02-05 PROCEDURE — 40000116 ZZH STATISTIC OP CR VISIT

## 2020-02-05 PROCEDURE — 93798 PHYS/QHP OP CAR RHAB W/ECG: CPT

## 2020-02-06 DIAGNOSIS — E89.0 POSTABLATIVE HYPOTHYROIDISM: Primary | ICD-10-CM

## 2020-02-07 ENCOUNTER — AMBULATORY - HEALTHEAST (OUTPATIENT)
Dept: PULMONOLOGY | Facility: OTHER | Age: 65
End: 2020-02-07

## 2020-02-07 ENCOUNTER — COMMUNICATION - HEALTHEAST (OUTPATIENT)
Dept: PULMONOLOGY | Facility: OTHER | Age: 65
End: 2020-02-07

## 2020-02-07 DIAGNOSIS — J44.1 CHRONIC OBSTRUCTIVE PULMONARY DISEASE WITH ACUTE EXACERBATION (H): ICD-10-CM

## 2020-02-09 ENCOUNTER — COMMUNICATION - HEALTHEAST (OUTPATIENT)
Dept: INTERNAL MEDICINE | Facility: CLINIC | Age: 65
End: 2020-02-09

## 2020-02-09 DIAGNOSIS — E03.9 ACQUIRED HYPOTHYROIDISM: ICD-10-CM

## 2020-02-10 ENCOUNTER — DOCUMENTATION ONLY (OUTPATIENT)
Dept: CARE COORDINATION | Facility: CLINIC | Age: 65
End: 2020-02-10

## 2020-02-10 ENCOUNTER — HOSPITAL ENCOUNTER (OUTPATIENT)
Dept: CARDIAC REHAB | Facility: CLINIC | Age: 65
End: 2020-02-10
Attending: NURSE PRACTITIONER
Payer: MEDICARE

## 2020-02-10 PROCEDURE — 93797 PHYS/QHP OP CAR RHAB WO ECG: CPT

## 2020-02-10 PROCEDURE — 93798 PHYS/QHP OP CAR RHAB W/ECG: CPT

## 2020-02-10 PROCEDURE — 40000575 ZZH STATISTIC OP CARDIAC VISIT #2

## 2020-02-10 PROCEDURE — 40000116 ZZH STATISTIC OP CR VISIT

## 2020-02-11 ENCOUNTER — HOSPITAL ENCOUNTER (OUTPATIENT)
Dept: PALLIATIVE MEDICINE | Facility: OTHER | Age: 65
Discharge: HOME | End: 2020-02-11
Attending: ANESTHESIOLOGY

## 2020-02-11 ENCOUNTER — TELEPHONE (OUTPATIENT)
Dept: ENDOCRINOLOGY | Facility: CLINIC | Age: 65
End: 2020-02-11

## 2020-02-11 DIAGNOSIS — R73.03 PREDIABETES: Primary | ICD-10-CM

## 2020-02-11 DIAGNOSIS — G89.4 CHRONIC PAIN SYNDROME: ICD-10-CM

## 2020-02-11 ASSESSMENT — MIFFLIN-ST. JEOR: SCORE: 1805.95

## 2020-02-12 ENCOUNTER — ONCOLOGY VISIT (OUTPATIENT)
Dept: ONCOLOGY | Facility: CLINIC | Age: 65
End: 2020-02-12
Attending: INTERNAL MEDICINE
Payer: MEDICARE

## 2020-02-12 ENCOUNTER — APPOINTMENT (OUTPATIENT)
Dept: LAB | Facility: CLINIC | Age: 65
End: 2020-02-12
Attending: INTERNAL MEDICINE
Payer: MEDICARE

## 2020-02-12 ENCOUNTER — RECORDS - HEALTHEAST (OUTPATIENT)
Dept: ADMINISTRATIVE | Facility: OTHER | Age: 65
End: 2020-02-12

## 2020-02-12 VITALS
TEMPERATURE: 98.1 F | BODY MASS INDEX: 44.73 KG/M2 | RESPIRATION RATE: 18 BRPM | SYSTOLIC BLOOD PRESSURE: 124 MMHG | WEIGHT: 277 LBS | DIASTOLIC BLOOD PRESSURE: 74 MMHG | OXYGEN SATURATION: 94 % | HEART RATE: 104 BPM

## 2020-02-12 DIAGNOSIS — Z86.018 HISTORY OF PHEOCHROMOCYTOMA: ICD-10-CM

## 2020-02-12 DIAGNOSIS — R73.03 PREDIABETES: ICD-10-CM

## 2020-02-12 DIAGNOSIS — R06.02 SOB (SHORTNESS OF BREATH): ICD-10-CM

## 2020-02-12 DIAGNOSIS — E89.0 POSTABLATIVE HYPOTHYROIDISM: ICD-10-CM

## 2020-02-12 DIAGNOSIS — I27.20 PULMONARY HYPERTENSION (H): ICD-10-CM

## 2020-02-12 DIAGNOSIS — E83.119 HEMOCHROMATOSIS, UNSPECIFIED HEMOCHROMATOSIS TYPE: ICD-10-CM

## 2020-02-12 LAB
ANION GAP SERPL CALCULATED.3IONS-SCNC: 8 MMOL/L (ref 3–14)
BASOPHILS # BLD AUTO: 0 10E9/L (ref 0–0.2)
BASOPHILS NFR BLD AUTO: 0.4 %
BUN SERPL-MCNC: 28 MG/DL (ref 7–30)
CALCIUM SERPL-MCNC: 9.2 MG/DL (ref 8.5–10.1)
CHLORIDE SERPL-SCNC: 107 MMOL/L (ref 94–109)
CO2 SERPL-SCNC: 25 MMOL/L (ref 20–32)
CREAT SERPL-MCNC: 0.71 MG/DL (ref 0.52–1.04)
DIFFERENTIAL METHOD BLD: NORMAL
EOSINOPHIL # BLD AUTO: 0.1 10E9/L (ref 0–0.7)
EOSINOPHIL NFR BLD AUTO: 1.8 %
ERYTHROCYTE [DISTWIDTH] IN BLOOD BY AUTOMATED COUNT: 14.3 % (ref 10–15)
FERRITIN SERPL-MCNC: 19 NG/ML (ref 8–252)
GFR SERPL CREATININE-BSD FRML MDRD: 89 ML/MIN/{1.73_M2}
GLUCOSE SERPL-MCNC: 120 MG/DL (ref 70–99)
HBA1C MFR BLD: 7.2 % (ref 0–5.6)
HCT VFR BLD AUTO: 42.5 % (ref 35–47)
HGB BLD-MCNC: 14 G/DL (ref 11.7–15.7)
IMM GRANULOCYTES # BLD: 0.1 10E9/L (ref 0–0.4)
IMM GRANULOCYTES NFR BLD: 0.9 %
LYMPHOCYTES # BLD AUTO: 1 10E9/L (ref 0.8–5.3)
LYMPHOCYTES NFR BLD AUTO: 12.7 %
MCH RBC QN AUTO: 29.7 PG (ref 26.5–33)
MCHC RBC AUTO-ENTMCNC: 32.9 G/DL (ref 31.5–36.5)
MCV RBC AUTO: 90 FL (ref 78–100)
MONOCYTES # BLD AUTO: 0.5 10E9/L (ref 0–1.3)
MONOCYTES NFR BLD AUTO: 6.9 %
NEUTROPHILS # BLD AUTO: 6 10E9/L (ref 1.6–8.3)
NEUTROPHILS NFR BLD AUTO: 77.3 %
NRBC # BLD AUTO: 0 10*3/UL
NRBC BLD AUTO-RTO: 0 /100
NT-PROBNP SERPL-MCNC: 235 PG/ML (ref 0–125)
PLATELET # BLD AUTO: 224 10E9/L (ref 150–450)
POTASSIUM SERPL-SCNC: 3.5 MMOL/L (ref 3.4–5.3)
RBC # BLD AUTO: 4.72 10E12/L (ref 3.8–5.2)
SODIUM SERPL-SCNC: 141 MMOL/L (ref 133–144)
T4 FREE SERPL-MCNC: 0.9 NG/DL (ref 0.76–1.46)
TSH SERPL DL<=0.005 MIU/L-ACNC: 6.04 MU/L (ref 0.4–4)
WBC # BLD AUTO: 7.8 10E9/L (ref 4–11)

## 2020-02-12 PROCEDURE — 83036 HEMOGLOBIN GLYCOSYLATED A1C: CPT | Performed by: INTERNAL MEDICINE

## 2020-02-12 PROCEDURE — 80048 BASIC METABOLIC PNL TOTAL CA: CPT | Performed by: INTERNAL MEDICINE

## 2020-02-12 PROCEDURE — G0463 HOSPITAL OUTPT CLINIC VISIT: HCPCS | Mod: ZF

## 2020-02-12 PROCEDURE — 84443 ASSAY THYROID STIM HORMONE: CPT | Performed by: INTERNAL MEDICINE

## 2020-02-12 PROCEDURE — 85025 COMPLETE CBC W/AUTO DIFF WBC: CPT | Performed by: INTERNAL MEDICINE

## 2020-02-12 PROCEDURE — 36415 COLL VENOUS BLD VENIPUNCTURE: CPT

## 2020-02-12 PROCEDURE — 99214 OFFICE O/P EST MOD 30 MIN: CPT | Mod: ZP | Performed by: INTERNAL MEDICINE

## 2020-02-12 PROCEDURE — 82728 ASSAY OF FERRITIN: CPT | Performed by: INTERNAL MEDICINE

## 2020-02-12 PROCEDURE — 83880 ASSAY OF NATRIURETIC PEPTIDE: CPT | Performed by: INTERNAL MEDICINE

## 2020-02-12 PROCEDURE — 84439 ASSAY OF FREE THYROXINE: CPT | Performed by: INTERNAL MEDICINE

## 2020-02-12 ASSESSMENT — PAIN SCALES - GENERAL: PAINLEVEL: MODERATE PAIN (4)

## 2020-02-12 NOTE — PROGRESS NOTES
[facility] follow-up visit note    Chief complaint hemochromatosis    History of present illness: Patient with very complicated past medical history as detailed in HPI section who was diagnosed with [id number] homozygote; H63D not detected.  She had received 3 phlebotomies as per her report and then was lost to follow-up and then reestablished cares with me in October 2019.  At that time based on laboratory evaluation and determination was made that the patient did not need any further phlebotomies but several recommendations were made including liver MRI evaluation of her heart function and thyroid.unfortunately the patient did not follow through on any of those recommendations due to her other medical issues.  She is here for future further follow-up  Interval history :the patient states today that she has done okay over this time, she battled depression pretty bad in the interim but she is starting to recover and feels like she is in a much better space now than where she was even a few weeks ago.   She continues to have fatigue but does not have small joint arthritis any change in bowel bladder habits any lymphadenopathy lumps or bumps anywhere no abdominal pain nausea vomiting no intermittent fevers or chills,.     Past Medical History:   Diagnosis Date     Anxiety      Bipolar disorder (H)      Breast cancer (H) 1986    lumpectomy, radiation, chemo     COPD (chronic obstructive pulmonary disease) (H)     asthma     Depression, major, recurrent (H)      Drug tolerance     opioid     Esophageal reflux      Fatigue      Graves disease 1994     Hemochromatosis 02/14/2018    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; H63D not detected     Hyperlipidaemia      Hypertension      Impaired fasting glucose 2017     Joint pain      Morbid obesity (H)      KYAW (obstructive sleep apnea) 2016     Osteopenia      Pheochromocytoma, left 08/02/2017    laparoscopically removed     Postablative hypothyroidism 1995     Psoriasis       Psoriatic arthropathy (H)      RLS (restless legs syndrome)      Sacroiliitis (H)      Snoring      Spinal stenosis      Vitamin B 12 deficiency 2009     Vitamin D deficiency 2010     Past Surgical History:   Procedure Laterality Date     ARTHRODESIS ANKLE       ARTHROPLASTY ANKLE Right 6/29/2015    Procedure: ARTHROPLASTY ANKLE;  Surgeon: Jason Coughlin MD;  Location: [facility]     ARTHROPLASTY REVISION ANKLE Right 6/29/2015    Procedure: ARTHROPLASTY REVISION ANKLE;  Surgeon: Jason Coughlin MD;  Location: [facility]     BREAST BIOPSY, CORE RT/LT       COLONOSCOPY       CV CORONARY ANGIOGRAM N/A 10/3/2019    Procedure: CV CORONARY ANGIOGRAM;  Surgeon: Bryce Pierre MD;  Location:  [facility]ATH [facility]     CV RIGHT HEART CATH N/A 10/3/2019    Procedure: CV RIGHT HEART CATH;  Surgeon: Bryce Pierre MD;  Location:  HEART CARDI[facility] CATH LAB     LAPAROSCOPIC ADRENALECTOMY Left 08/02/2017    pheochromocytoma     LENGTHEN TENDON ACHILLES Right 6/29/2015    Procedure: LENGTHEN TENDON ACHILLES;  Surgeon: Jason Coughlin MD;  Location: [facility]     LUMPECTOMY BREAST       MAMMOPLASTY REDUCTION Right 01/13/2015    [place]     MASTECTOMY MODIFIED RADICAL       REPAIR HAMMER TOE Right 6/29/2015    Procedure: REPAIR HAMMER TOE;  Surgeon: Jason Coughlin MD;  Location: [facility]     TONSILLECTOMY & ADENOIDECTOMY       Family History   Problem Relation Age of Onset     Lupus Sister      Hypertension Sister      Obesity Sister      Scleroderma Sister      Heart Failure Sister      Hemochromatosis Sister      Hemochromatosis Brother      Hypertension Brother      Hemochromatosis Father      Myocardial Infarction Father      Snoring Father      Obesity Mother      Thyroid Disease Mother      Adrenal Disorder No family hx of      Social History     Socioeconomic History     Marital status:      Spouse name: Not on file     Number of children: Not on file     Years of education: Not on file      "Highest education level: Not on file   Occupational History     Not on file   Social Needs     Financial resource strain: Not on file     Food insecurity:     Worry: Not on file     Inability: Not on file     Transportation needs:     Medical: Not on file     Non-medical: Not on file   Tobacco Use     Smoking status: Former Smoker     Packs/day: 2.50     Years: 20.00     Pack years: 50.00     Types: Cigarettes     Last attempt to quit: 2000     Years since quittin.6     Smokeless tobacco: Never Used   Substance and Sexual Activity     Alcohol use: No     Comment: relapse 2019 sober      Drug use: No     Sexual activity: Not on file   Lifestyle     Physical activity:     Days per week: Not on file     Minutes per session: Not on file     Stress: Not on file   Relationships     Social connections:     Talks on phone: Not on file     Gets together: Not on file     Attends Christian service: Not on file     Active member of club or organization: Not on file     Attends meetings of clubs or organizations: Not on file     Relationship status: Not on file     Intimate partner violence:     Fear of current or ex partner: Not on file     Emotionally abused: Not on file     Physically abused: Not on file     Forced sexual activity: Not on file   Other Topics Concern     Parent/sibling w/ CABG, MI or angioplasty before 65F 55M? Not Asked   Social History Narrative     Not on file        Allergies   Allergen Reactions     Gabapentin      Drove on the wrong side of the highway     Levofloxacin      \"CAN'T REMEMBER\"     Penicillins      \"SORES IN MOUTH\"     Sulfa Drugs      \"PT DOES NOT KNOW WHAT THE REACTION WAS\"     Current Outpatient Medications   Medication Sig Dispense Refill     albuterol (ACCUNEB) 0.63 MG/3ML neb solution Take 1 ampule by nebulization every 6 hours as needed for shortness of breath / dyspnea or wheezing       ANORO ELLIPTA 62.5-25 MCG/INH oral inhaler TAKE 1 PUFF BY MOUTH EVERY DAY  6     " ASPIRIN PO Take 81 mg by mouth 2 times daily        benzonatate (TESSALON) 100 MG capsule TAKE 1 CAPSULE BY MOUTH THREE TIMES A DAY AS NEEDED FOR COUGH       BUTRANS 10 MCG/HR WK patch PLACE 1 PATCH ON THE SKIN EVERY 7 DAYS.  2     Desvenlafaxine Succinate (PRISTIQ PO) Take 100 mg by mouth daily        diclofenac (VOLTAREN) 1 % topical gel Place onto the skin 4 times daily       furosemide (LASIX) 20 MG tablet Take 2 tablets (40 mg) by mouth daily 180 tablet 3     hydrOXYzine (VISTARIL) 25 MG capsule 25 mg 3 times daily as needed   0     losartan (COZAAR) 25 MG tablet TAKE 1 TABLET BY MOUTH EVERY DAY 90 tablet 1     Omeprazole (PRILOSEC PO) Take 20 mg by mouth 2 times daily (before meals)       potassium chloride ER (K-DUR/KLOR-CON M) 20 MEQ CR tablet Take 1 tablet (20 mEq) by mouth daily 91 tablet 3     Pramipexole Dihydrochloride (MIRAPEX PO) Take 1 mg by mouth 2 times daily        Cholecalciferol (VITAMIN D3) 250 MCG (08561 UT) TABS Take 1 tablet by mouth daily 38788/day       Cyanocobalamin (VITAMIN B-12) 5000 MCG SUBL Unknown dose. 2 or 3 sprays/day       levothyroxine (SYNTHROID/LEVOTHROID) 175 MCG tablet Take 1 tablet (175 mcg) by mouth daily 90 tablet 4     metFORMIN 500 MG PO 24 hr tablet Take 1 tablet by mouth with dinner  X 2 weeks , then 2 tablets X 30 days and if tolerated increase to  3 tablets daily 180 tablet 3     Physical exam :   VS /74   Pulse 104   Temp 98.1  F (36.7  C) (Oral)   Resp 18   Wt 125.6 kg (277 lb)   SpO2 94%   BMI 44.73 kg/m      Generally alert oriented x3 no apparent distress  HEENT moist mucous membrane no icterus or pallor noted  Lower extremity no pedal edema no chronic sclerotic venous congestion changes  Psych appropriate affect  Skin no bruising noted  Neuro cranial nerves II through XII intact moving all extremities appropriately    Labs   Results for GEETA BOSS (MRN [id number]) as of 3/3/2020 15:22   Ref. Range 2/12/2020 09:58   Sodium Latest Ref Range:  133 - 144 mmol/L 141   Potassium Latest Ref Range: 3.4 - 5.3 mmol/L 3.5   Chloride Latest Ref Range: 94 - 109 mmol/L 107   Carbon Dioxide Latest Ref Range: 20 - 32 mmol/L 25   Urea Nitrogen Latest Ref Range: 7 - 30 mg/dL 28   Creatinine Latest Ref Range: 0.52 - 1.04 mg/dL 0.71   GFR Estimate Latest Ref Range: >60 mL/min/1.73_m2 89   GFR Estimate If Black Latest Ref Range: >60 mL/min/1.73_m2 >90   Calcium Latest Ref Range: 8.5 - 10.1 mg/dL 9.2   Anion Gap Latest Ref Range: 3 - 14 mmol/L 8   Hemoglobin A1C Latest Ref Range: 0 - 5.6 % 7.2 (H)   Ferritin Latest Ref Range: 8 - 252 ng/mL 19   N-Terminal Pro Bnp Latest Ref Range: 0 - 125 pg/mL 235 (H)   T4 Free Latest Ref Range: 0.76 - 1.46 ng/dL 0.90   TSH Latest Ref Range: 0.40 - 4.00 mU/L 6.04 (H)   Glucose Latest Ref Range: 70 - 99 mg/dL 120 (H)   WBC Latest Ref Range: 4.0 - 11.0 10e9/L 7.8   Hemoglobin Latest Ref Range: 11.7 - 15.7 g/dL 14.0   Hematocrit Latest Ref Range: 35.0 - 47.0 % 42.5   Platelet Count Latest Ref Range: 150 - 450 10e9/L 224   RBC Count Latest Ref Range: 3.8 - 5.2 10e12/L 4.72   MCV Latest Ref Range: 78 - 100 fl 90   MCH Latest Ref Range: 26.5 - 33.0 pg 29.7   MCHC Latest Ref Range: 31.5 - 36.5 g/dL 32.9   RDW Latest Ref Range: 10.0 - 15.0 % 14.3   Diff Method Unknown Automated Method   % Neutrophils Latest Units: % 77.3   % Lymphocytes Latest Units: % 12.7   % Monocytes Latest Units: % 6.9   % Eosinophils Latest Units: % 1.8   % Basophils Latest Units: % 0.4   % Immature Granulocytes Latest Units: % 0.9   Nucleated RBCs Latest Ref Range: 0 /100 0   Absolute Neutrophil Latest Ref Range: 1.6 - 8.3 10e9/L 6.0   Absolute Lymphocytes Latest Ref Range: 0.8 - 5.3 10e9/L 1.0   Absolute Monocytes Latest Ref Range: 0.0 - 1.3 10e9/L 0.5   Absolute Eosinophils Latest Ref Range: 0.0 - 0.7 10e9/L 0.1   Absolute Basophils Latest Ref Range: 0.0 - 0.2 10e9/L 0.0   Abs Immature Granulocytes Latest Ref Range: 0 - 0.4 10e9/L 0.1   Absolute Nucleated RBC Unknown 0.0        Imaging :   ECHO  Interpretation Summary  1. Global and regional left ventricular function is normal with an EF of 55-  60%.  2. The right ventricle is normal size. Global right ventricular function is  mildly reduced.  3. No significant valvular disease.  4. Unable to assess pulmonary artery pressure.  5. IVC diameter <2.1 cm collapsing >50% with sniff suggests a normal RA  pressure of 3 mmHg.         PCI -Conclusion          Right sided filling pressures are moderately elevated.    Moderately elevated pulmonary artery hypertension.    Left sided filling pressures are mildly elevated.    Normal cardiac output level.     1. Normal coronary arteries.       Assessment and plan:    Hemochromatosis 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; H63D not detected  MRI Liver, iron stidies , Thyroid eval today. Base on the labs no interventions and continues observation .   Will follow up on the results of the [facility] MRI   RTC in 6 months with CBC and iron panel   Sara Hill   of Medicine   Hematology and medical Oncology   [facility]

## 2020-02-12 NOTE — LETTER
2/12/2020       RE: Randi Cleary  [address]  [place] [facility] [address]     Dear Colleague,    Thank you for referring your patient, Randi Cleary, to the [facility]. Please see a copy of my visit note below.    In 1990 she had Breast can and she got chemo and radiation after lumpectomy and LN dissection - She states that she got AC and 5Fu.   She underwent phlebotomy - ?? May be 3 phlebotomy.   ROS - Fatigued.     Sara Hill MD

## 2020-02-12 NOTE — NURSING NOTE
"Oncology Rooming Note    February 12, 2020 10:16 AM   Randi RIVERA Cathy Cleary is a 64 year old female who presents for:    Chief Complaint   Patient presents with     Oncology Clinic Visit     Return - Hemochromatosis, unspecified hemochromatosis type     Blood Draw     Labs drawn via  by RN in lab. VS taken.      Initial Vitals: /74   Pulse 104   Temp 98.1  F (36.7  C) (Oral)   Resp 18   Wt 125.6 kg (277 lb)   SpO2 95%   BMI 44.73 kg/m   Estimated body mass index is 44.73 kg/m  as calculated from the following:    Height as of 12/11/19: 1.676 m (5' 5.98\").    Weight as of this encounter: 125.6 kg (277 lb). Body surface area is 2.42 meters squared.  Moderate Pain (4) Comment: Data Unavailable   No LMP recorded. Patient is postmenopausal.  Allergies reviewed: Yes  Medications reviewed: Yes    Medications: Medication refills not needed today.  Pharmacy name entered into [facility]: [id number] [place], [place] - [address]    Clinical concerns: Lab Results and        Alex Tejeda, EMT              "

## 2020-02-12 NOTE — NURSING NOTE
Chief Complaint   Patient presents with     Oncology Clinic Visit     Return - Hemochromatosis, unspecified hemochromatosis type     Blood Draw     Labs drawn via  by RN in lab. VS taken.      Clara Fragoso RN,

## 2020-02-14 ENCOUNTER — TELEPHONE (OUTPATIENT)
Dept: CARDIOLOGY | Facility: CLINIC | Age: 65
End: 2020-02-14

## 2020-02-14 ENCOUNTER — COMMUNICATION - HEALTHEAST (OUTPATIENT)
Dept: PALLIATIVE MEDICINE | Facility: OTHER | Age: 65
End: 2020-02-14

## 2020-02-14 ENCOUNTER — VIRTUAL VISIT (OUTPATIENT)
Dept: ENDOCRINOLOGY | Facility: CLINIC | Age: 65
End: 2020-02-14
Payer: MEDICARE

## 2020-02-14 DIAGNOSIS — G47.33 OSA (OBSTRUCTIVE SLEEP APNEA): ICD-10-CM

## 2020-02-14 DIAGNOSIS — R06.02 SOB (SHORTNESS OF BREATH): ICD-10-CM

## 2020-02-14 DIAGNOSIS — I27.20 PULMONARY HYPERTENSION (H): Primary | ICD-10-CM

## 2020-02-14 DIAGNOSIS — M25.551 PAIN IN JOINT INVOLVING RIGHT PELVIC REGION AND THIGH: ICD-10-CM

## 2020-02-14 DIAGNOSIS — E11.9 TYPE 2 DIABETES MELLITUS WITHOUT COMPLICATION, WITHOUT LONG-TERM CURRENT USE OF INSULIN (H): Primary | ICD-10-CM

## 2020-02-14 DIAGNOSIS — E89.0 POSTABLATIVE HYPOTHYROIDISM: ICD-10-CM

## 2020-02-14 DIAGNOSIS — E66.01 CLASS 3 SEVERE OBESITY WITH SERIOUS COMORBIDITY AND BODY MASS INDEX (BMI) OF 40.0 TO 44.9 IN ADULT, UNSPECIFIED OBESITY TYPE (H): ICD-10-CM

## 2020-02-14 DIAGNOSIS — E66.813 CLASS 3 SEVERE OBESITY WITH SERIOUS COMORBIDITY AND BODY MASS INDEX (BMI) OF 40.0 TO 44.9 IN ADULT, UNSPECIFIED OBESITY TYPE (H): ICD-10-CM

## 2020-02-14 RX ORDER — METFORMIN HCL 500 MG
500 TABLET, EXTENDED RELEASE 24 HR ORAL
Qty: 180 TABLET | Refills: 3 | Status: SHIPPED | OUTPATIENT
Start: 2020-02-14 | End: 2020-02-20

## 2020-02-14 RX ORDER — MELATONIN 10 MG
1 TABLET, SUBLINGUAL SUBLINGUAL DAILY
COMMUNITY
Start: 2020-02-14 | End: 2023-11-01

## 2020-02-14 RX ORDER — LEVOTHYROXINE SODIUM 175 UG/1
175 TABLET ORAL DAILY
Qty: 90 TABLET | Refills: 4 | Status: SHIPPED | OUTPATIENT
Start: 2020-02-14 | End: 2020-12-09 | Stop reason: DRUGHIGH

## 2020-02-14 NOTE — PROGRESS NOTES
Endocrine Consult note    Attending Assessment/Plan :     diabetes by HgbA1c. The level is higher (worse) than 10/3/19.  Start metformin  mg/day, increase to 1000 mg/day in 2 weeks, increase to 1500 mg/day in 1 month.  Next step might be GLP1 for weight loss promotion .  Barrier might be cost as she has talked frequently today of concerns about med costs.     Hypothyroidism following radioiodine treatment for  Graves' .  Treat to normal target TSH. Unstable due to dose change in 11/19.  She is now prescribed  175 x 6.5/week (162.5 mcg/day average) but the labs show no improvement.  Increase to 175 mcg/day.   Repeat labs in 2 months    Obesity BMI 44.7. Her weight is up since I last saw her.   She might be a good candidate for the weight management clinic.  Discussed.      History of left sided pheochromocytoma, 2.8 cm, removed 8/17.  This has been presumed sporadic but it might not be.    12/4/19 biochemistry supports she is cured.       History of unilateral adrenalectomy; history of corticosteroid injection 11/16/17, history of prednisone burst to 40 mg/day on 10/16/19.      Sleep apnea , prescribed  CPAP, not using it.  Discussed, reinforced the need.      On opiates - transdermal buprenorphine.  Opiates can down regulate the HPA    Hemocrhomatosis can affect endocrine organ function.  I am listing this so I don't forget about it.     Start time  0831  Stop time 0900  Total time  30 minutes    Alee Coker MD    Chief complaint/ HISTORY OF PRESENT ILLNESS Winnie returns for follow up of hypothyroidism following 131I for Graves', history of left pheochromocytoma with unilateral adrenalectomy, prediabetes.  I have seen her once before in 11/19.  At that time we increased the LT4 from 150 mcg/dayto 162 mcg/day (175 x 6.5/week, divided).  She already had labs in anticipation of this appt.  The TFTS do not show the expected improvement.      She has history of Graves' treated with 131I x 2 many years ago.   She has been on LT4 since then.   Today she says she is using a pill tray and remembering to take the 1/2 dose one day/week.      Adrenal mass was lst noted in 2006.  At that time it was 1.5 cm.  On follow up imaging in 2017 it was 2.5 cm.  She underwent laparoscopic left adrenalectomy in 8/2/17 ([facility]).  Surgical pathology was read as pheochromocytoma 2.8x 2 x 2.2, confined to the left adrenal.  she recalled how this caused rapid onset rage , sweating and her BP wasn't that high.      She was diagnosed with sleep apnea since our last visit. :The sleep medicine notes suggest she is not meeting objective benchmarks for compliance.       We have the following relevant labs  8/4/09 TSH 1.48  8/17/10 TSH 1.47  10/16/13 TSH 8.39  1/7/14 TSH 14.6  3/12/14 TSH 4.2  8/5/14 TSH 3.09  10/27/15 TSH 1.24  7/15/16 TSH 0.46  12/2/16 TSH 4.62  2/8/17: TSH 3.54  4/4/17: TSH 0.3  4/19/17 urine cortisol 17 mcg/24 hours (3.5-45); metanephrine 719 mcg/24 hours (normal < 400), normetanephrine 409 mcg/24 hours (< 900), total metanephrine 1128 mcg/24 hours (< 1300), NE 47 mcg/24 hours (15-80), EPI 18 mcg/24 hoiurs (< 21),  ( mcg/24 hours), 24 hour urine volume 2025 12/19/17: TSH 3.19  8/9/19 TSH 5.35, 25OHD 35.8  8/13/19: TSH 9.15, free T4 0.87, cholesterol 221, , HDL 78, LDL 99  10/3/19 glucose 110, HgbA1c 6%, K 3.2,   10/14/19 TSH 2.74, 25OHD 66, b12 > 2000, folate 10.8  11/18/19: TSH 7.54, free T4 0.86  12/4/19 free T4 1.16, normetanephrine 0.89, metanephrine 0.12  2/12/2020: TSH 6.04, free T4 0.9, HgbA1c 7.2%, Ca 9.2, creatinine 0.71, glucose 120      Imaging  5/15/1994 15 mCi 131I; prior 123I thyroid uptake scan: 69.1% 24 iilr ruprake; enlarged gland with prominent pyramidal lobe  12/7/1994: 123I thyroid uptake 52%  3/9/17 US thyroid ([facility]  [facility]17 [facility] 2.5 cm left adrenal nodule (was 1.5 11/9/06)  6/24/19 CTA chest: evidence for pulmonary artery HTN, hepatic steattosis;     REVIEW OF  "SYSTEMS  Feel hypo  Nails brittle and breaking off  Hair is really thin  Weight is up to 274#  Terrible time trying to lose weight  Still sleeping in recliner; she is working on CPAP \"getting it switched over\"-- not currently on CPAP; sleep is poor  Walking/ extra weight  is killing my back - walking to do ADLS, grocery store, to the car, etc.  Walks hunched over ; walking in the pool is much easier; doesn't walk the treadmill at cardiac rehab duet o the back pain   Cardiac: Trying to get to cardiac rehab- hard to make appts, very hard , doing the best I can ; 3 times/week is too much;   Fluid retention despite being on lasix - feet are always swollen and up the ankles- can see sock indentations  BP has gone down with the exercise - much improved.  Had high BP when she saw Dr Noriega;   Respiratory: ANNA with walking.   GI: stomach is foreign, I don't know where this is coming from; never felt good in stomach since having the pheochromocytoma (long story about past HH in relationship to this); milk products \"come up on me\"; feel like \"food is coming up on me\" ; BM is at least once/day; diarrhea might be depending on what I eat, but mostly hard.   Shoulder pain.    The opioid patch doesn't do a damn thing except cost > 100/month  Used to get epidural injections in the back  Dizzy and shaky yesterday AM and this AM  Feet are numb for a while.     Past Medical History  Past Medical History:   Diagnosis Date     Anxiety      Bipolar disorder (H)      Breast cancer (H) 1986    lumpectomy, radiation, chemo     COPD (chronic obstructive pulmonary disease) (H)     asthma     Depression, major, recurrent (H)      Drug tolerance     opioid     Esophageal reflux      Fatigue      Graves disease 1994     Hemochromatosis 02/14/2018    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; H63D not detected     Hyperlipidaemia      Hypertension      Impaired fasting glucose 2017     Joint pain      Morbid obesity (H)      KYAW (obstructive sleep apnea) 2016     " Osteopenia      Pheochromocytoma, left 08/02/2017    laparoscopically removed     Postablative hypothyroidism 1995     Psoriasis      Psoriatic arthropathy (H)      RLS (restless legs syndrome)      Sacroiliitis (H)      Snoring      Spinal stenosis      Vitamin B 12 deficiency 2009     Vitamin D deficiency 2010     Past Surgical History:   Procedure Laterality Date     ARTHRODESIS ANKLE       ARTHROPLASTY ANKLE Right 6/29/2015    Procedure: ARTHROPLASTY ANKLE;  Surgeon: Jason Coughlin MD;  Location: [facility]     ARTHROPLASTY REVISION ANKLE Right 6/29/2015    Procedure: ARTHROPLASTY REVISION ANKLE;  Surgeon: Jason Coughlin MD;  Location: [facility]     BREAST BIOPSY, CORE RT/LT       COLONOSCOPY       CV CORONARY ANGIOGRAM N/A 10/3/2019    Procedure: CV CORONARY ANGIOGRAM;  Surgeon: Bryce Pierre MD;  Location:  HEART CARDIAC CATH LAB     CV RIGHT HEART CATH N/A 10/3/2019    Procedure: CV RIGHT HEART CATH;  Surgeon: Bryce Pierre MD;  Location:  HEART CARDIAC CATH LAB     LAPAROSCOPIC ADRENALECTOMY Left 08/02/2017    pheochromocytoma     LENGTHEN TENDON ACHILLES Right 6/29/2015    Procedure: LENGTHEN TENDON ACHILLES;  Surgeon: Jason Coughlin MD;  Location: [facility]     LUMPECTOMY BREAST       MAMMOPLASTY REDUCTION Right 01/13/2015    [place]     MASTECTOMY MODIFIED RADICAL       REPAIR HAMMER TOE Right 6/29/2015    Procedure: REPAIR HAMMER TOE;  Surgeon: Jason Coughlin MD;  Location: [facility]     TONSILLECTOMY & ADENOIDECTOMY       Breast lumpectomy 1986  Breast lumpectomy 1994  Reconstruction on right breast to make more symmmetrical to left       Medications    Current Outpatient Medications   Medication Sig Dispense Refill     albuterol (ACCUNEB) 0.63 MG/3ML neb solution Take 1 ampule by nebulization every 6 hours as needed for shortness of breath / dyspnea or wheezing       ANORO ELLIPTA 62.5-25 MCG/INH oral inhaler TAKE 1 PUFF BY MOUTH EVERY DAY  6     ASPIRIN PO Take 81 mg by mouth  "2 times daily        benzonatate (TESSALON) 100 MG capsule TAKE 1 CAPSULE BY MOUTH THREE TIMES A DAY AS NEEDED FOR COUGH       BUTRANS 10 MCG/HR WK patch PLACE 1 PATCH ON THE SKIN EVERY 7 DAYS.  2     Cholecalciferol (VITAMIN D3) 52675 units TABS Take 1 tablet by mouth daily       Cyanocobalamin (VITAMIN B-12) 5000 MCG SUBL Place 5,000 mcg under the tongue       Desvenlafaxine Succinate (PRISTIQ PO) Take 100 mg by mouth daily        diclofenac (VOLTAREN) 1 % topical gel Place onto the skin 4 times daily       furosemide (LASIX) 20 MG tablet Take 2 tablets (40 mg) by mouth daily 180 tablet 3     hydrOXYzine (VISTARIL) 25 MG capsule 25 mg 3 times daily as needed   0     levothyroxine (SYNTHROID/LEVOTHROID) 175 MCG tablet MON to SAT 1 tablet/day; SUN 0.5 tablet 90 tablet 4     losartan (COZAAR) 25 MG tablet TAKE 1 TABLET BY MOUTH EVERY DAY 90 tablet 1     Omeprazole (PRILOSEC PO) Take 20 mg by mouth 2 times daily (before meals)       potassium chloride ER (K-DUR/KLOR-CON M) 20 MEQ CR tablet Take 1 tablet (20 mEq) by mouth daily 91 tablet 3     Pramipexole Dihydrochloride (MIRAPEX PO) Take 1 mg by mouth 2 times daily        LT4 175 x 6/week, divided-- taking in the middle of the night      Allergies  Allergies   Allergen Reactions     Gabapentin      Drove on the wrong side of the highway     Levofloxacin      \"CAN'T REMEMBER\"     Penicillins      \"SORES IN MOUTH\"     Sulfa Drugs      \"PT DOES NOT KNOW WHAT THE REACTION WAS\"        Social History  Social History     Tobacco Use     Smoking status: Former Smoker     Packs/day: 2.50     Years: 20.00     Pack years: 50.00     Types: Cigarettes     Last attempt to quit: 2000     Years since quittin.6     Smokeless tobacco: Never Used   Substance Use Topics     Alcohol use: No     Comment: relapse 2019 sober      Drug use: No       Physical Exam  GENERAL :talking most of the time  NEURO:  Difficult historian    DATA REVIEW      "

## 2020-02-14 NOTE — RESULT ENCOUNTER NOTE
Please call the patient to inform about the lab results that all the labs are acceptable and we will continue the plan as discussed in clinic. No need for phlebotomy until next visit when we will make a determination based on labs.     Sara Hill   of Medicine   Hematology and medical Oncology   [facility]

## 2020-02-14 NOTE — PATIENT INSTRUCTIONS
Start metformin XR (extended release) 500 mg/day.  IF you tolerate it we will increase to 1000 mg/day in a few weeks.  IF you tolerate it , we will increase to 1500 mg/day in about one month.     Increase levothyroxine to 175 mcg/day, every day the same.      You should have labs to follow up on the change in about 2 months.  I am leaving orders for follow up labs on the medical record.  Please call [phone]  to schedule lab follow up testing as directed.  Alternatively, it can be drawn in any [place] lab.

## 2020-02-14 NOTE — TELEPHONE ENCOUNTER
Verified that labs were drawn and are WNL; called patient to go over her results. Patient verbalized that she is not able to complete cardiac rehab d/t the weather and her URIs. I advised that after she sees Dr. Edwards in the springtime, we can re-enroll her. Patient verbalized understanding and did not have any further questions. Bindu Walden RN on 2/21/2020 at 9:45 AM    Called to follow up to see if patient got her labs drawn. Patient states she forgot but will go tomorrow to the [facility] to get her labs drawn. Patient states she has been very diligent with her water and salt intake. She states she is down 4-5 lbs and feels much better. I will follow up with labs on Friday. Bindu Walden RN on 2/19/2020 at 10:27 AM  _____________________________________________________________________    Patient called and asked to go over lab results ordered by Dr. Edwards; noted that BNP is elevated from previous draws. Asked if the patient feels like she is retaining fluid and patient stated that she has noticed her feet remain swollen and her abdomen is full. Current weight around 274-276 lbs. Advised to take an extra 20 mg of Lasix and an extra 20 mEq of Potassium each evening for the next three days. Patient will have her BMP and BNP redrawn Monday. Patient will cut down on the cheese, desserts, and extra chips she is eating as well to help keep fluid off. Patient verbalized understanding and did not have any further questions. Bindu Walden RN on 2/14/2020 at 9:32 AM

## 2020-02-17 ENCOUNTER — HOSPITAL ENCOUNTER (OUTPATIENT)
Dept: CARDIAC REHAB | Facility: CLINIC | Age: 65
End: 2020-02-17
Attending: NURSE PRACTITIONER
Payer: MEDICARE

## 2020-02-17 ENCOUNTER — TELEPHONE (OUTPATIENT)
Dept: ONCOLOGY | Facility: CLINIC | Age: 65
End: 2020-02-17

## 2020-02-17 PROCEDURE — 40000116 ZZH STATISTIC OP CR VISIT

## 2020-02-17 PROCEDURE — 93798 PHYS/QHP OP CAR RHAB W/ECG: CPT

## 2020-02-17 PROCEDURE — 93797 PHYS/QHP OP CAR RHAB WO ECG: CPT | Mod: 59

## 2020-02-17 PROCEDURE — 40000575 ZZH STATISTIC OP CARDIAC VISIT #2

## 2020-02-17 NOTE — TELEPHONE ENCOUNTER
----- Message from Sara Hill MD sent at 2/14/2020  2:02 PM CST -----  Please call the patient to inform about the lab results that all the labs are acceptable and we will continue the plan as discussed in clinic. No need for phlebotomy until next visit when we will make a determination based on labs.     Sara Hill   of Medicine   Hematology and medical Oncology   [facility]

## 2020-02-17 NOTE — TELEPHONE ENCOUNTER
Writer placed call to patient regarding acceptable lab results as indicated by Dr Sara Hill and no need for phlebotomy at this time. Plan of care to continue as discussed with patient in recent clinic visit with determination of phlebotomy after next visit. Patient voiced understanding of plan.

## 2020-02-18 NOTE — PROGRESS NOTES
"NUTRITION ASSESSMENT AND EDUCATION    Reason For Assessment  Randi Cleary is a 64 year old female seen by Registered Dietitian for 1:1 Cardiac Rehab consult    Information obtained from patient. Pt provided food recall as below. Pt has multiple medical diagnosis(diabetes, cardiac disease with noted fluid retention) with other past medical history reviewed. Pt's weight now trending down in the setting of diuresis, which pt reports feeling \"much better\". Pt will be started on Metformin for diabetic control and had questions about side effects(diarrhea) of this medication, RD encouraged medication compliance and if pt were to have diarrhea, reviewed potential dietary interventions and potential for OTC medications(metamucil/benefiber) for relief of possible side effects. Pt has attending group cardiac class and has been instituting RD recommendations already. Pt enjoys cooking and has been attempting to decrease sodium in cooking. RD additionally reviewed importance of portion control and monitoring CHO and calories in diet, provided pt with multiple handouts for guidance.     Diet Recall - Day 1  Breakfast Not dicussed    Lunch Soups/leftovers    Dinner Pt enjoys making different soups   Snacks Popcorn    Beverages Water      Dining Out: On occasion     LABS  Results for GEETA BOSS (MRN [id number]) as of 2/17/2020 18:13   Ref. Range 2/12/2020 09:58   N-Terminal Pro Bnp Latest Ref Range: 0 - 125 pg/mL 235 (H)      2/12/2020 09:58   Hemoglobin A1C 7.2 (H)      8/13/2019 17:43   Cholesterol 221 (H)   HDL Cholesterol 78   LDL Cholesterol Calculated 99   Non HDL Cholesterol 143 (H)   Triglycerides 220 (H)       MEDICATIONS  Vit D, Vit B12, Lasix, Synthroid, Cozaar, Prilosec, K-DUR    ANTHROPOMETRICS  Height:  Ht Readings from Last 1 Encounters:   12/11/19 1.676 m (5' 5.98\")     Weight:   Wt Readings from Last 10 Encounters:   02/12/20 125.6 kg (277 lb)   12/11/19 119.3 kg (263 lb)   11/18/19 122.5 kg (270 lb) "   10/22/19 120.2 kg (265 lb)   09/25/19 118.5 kg (261 lb 3.2 oz)   09/24/19 119 kg (262 lb 4.8 oz)   09/19/19 119.4 kg (263 lb 3.2 oz)   09/13/19 120.7 kg (266 lb 1.6 oz)   09/04/19 118.9 kg (262 lb 3.2 oz)   08/13/19 117.5 kg (259 lb)   6.9% weight gain in 6 months   BMI Readings from Last 3 Encounters:   02/12/20 44.73 kg/m    12/11/19 42.47 kg/m    11/18/19 43.58 kg/m      Weight Status:  Obesity Grade III BMI >40  IBW: 59 kg   % IBW: 212    Dosing weight: 75.4 kg - adjusted BW    ASSESSED NUTRITION NEEDS   Estimated Energy Needs: <1885 kcals (25 Kcal/Kg)  Justification: obese  Estimated Protein Needs: 60 grams protein (0.8 g pro/Kg)  Justification: obesity guidelines   Estimated Fluid Needs: 2260  mL (30 ml/kg)  Justification: maintenance    NUTRITION DIAGNOSIS  Food- and nutrition- related knowledge deficit related to therapeutic diet recommendations as evidenced by pt report of no/limited formal nutrition education.    INTERVENTIONS  Nutrition Prescription:  Cardiac diet: Low saturated fat, Na <2400 mg, consistent CHO, encouraging 1500 calories per day for weight loss   The Plate Method of structuring a balanced meal    Implementation:  1. Assessed learning needs and learning preference.  2. Nutrition Education (Content):  a) Provided handouts: Heart Healthy Nutrition Therapy (including foods list, meal plans, and tips for a heart healthy diet), Carbohydrate counting, and 1500 calorie meal plan with 5 days of menus   b) Discussed heart healthy diet recommendations, including minimizing added salts/saturated fats/sugars, balanced meals TID, heart healthy substitutes    Nutrition Education (Application):  a) Discussed eating habits and recommended alternative food choices:  a. Emphasized fruits, vegetables, low sodium foods, nuts and other heart healthy fats, fish, low fat dairy, and plant sources of protein.   b. Reviewed recipe changes and new food ideas  c. Reviewed calorie plan     Goals  1. Follow a  cardiac, consistent CHO, 1500 calorie diet and other nutrition related recommendations     Follow up/Monitoring For Cardiac Rehab Staff  Additional questions/concerns related to heart healthy guidelines. Pt may benefit from additional outpatient referral and nutritional counseling through weight management clinic.

## 2020-02-19 ENCOUNTER — TELEPHONE (OUTPATIENT)
Dept: ENDOCRINOLOGY | Facility: CLINIC | Age: 65
End: 2020-02-19

## 2020-02-19 NOTE — TELEPHONE ENCOUNTER
[facility] Call Center    Phone Message    May a detailed message be left on voicemail: yes     Reason for Call: Other: The pt is returning Nick's call about an appt w/Dr Coker. Her schedule is booked thru 8.31.2020. Please call the pt to schedule appt and labs? Thanks     Action Taken: Message routed to:  [facility] ([facility]): nyasia locke    Travel Screening: Not Applicable

## 2020-02-20 ENCOUNTER — AMBULATORY - HEALTHEAST (OUTPATIENT)
Dept: LAB | Facility: CLINIC | Age: 65
End: 2020-02-20

## 2020-02-20 ENCOUNTER — OFFICE VISIT - HEALTHEAST (OUTPATIENT)
Dept: PULMONOLOGY | Facility: OTHER | Age: 65
End: 2020-02-20

## 2020-02-20 DIAGNOSIS — E89.6 HISTORY OF PARTIAL ADRENALECTOMY (H): ICD-10-CM

## 2020-02-20 DIAGNOSIS — R06.02 SHORTNESS OF BREATH: ICD-10-CM

## 2020-02-20 DIAGNOSIS — E66.01 CLASS 3 SEVERE OBESITY DUE TO EXCESS CALORIES WITH SERIOUS COMORBIDITY AND BODY MASS INDEX (BMI) OF 40.0 TO 44.9 IN ADULT (H): ICD-10-CM

## 2020-02-20 DIAGNOSIS — Z92.241 HISTORY OF CORTICOSTEROID THERAPY: ICD-10-CM

## 2020-02-20 DIAGNOSIS — E89.0 POSTABLATIVE HYPOTHYROIDISM: ICD-10-CM

## 2020-02-20 DIAGNOSIS — Z86.018 PERSONAL HISTORY OF PHEOCHROMOCYTOMA: ICD-10-CM

## 2020-02-20 DIAGNOSIS — J44.1 COPD EXACERBATION (H): ICD-10-CM

## 2020-02-20 DIAGNOSIS — I27.20 PULMONARY HYPERTENSION (H): ICD-10-CM

## 2020-02-20 DIAGNOSIS — J44.9 CHRONIC OBSTRUCTIVE PULMONARY DISEASE, UNSPECIFIED COPD TYPE (H): ICD-10-CM

## 2020-02-20 DIAGNOSIS — R73.03 PREDIABETES: ICD-10-CM

## 2020-02-20 DIAGNOSIS — J44.1 CHRONIC OBSTRUCTIVE PULMONARY DISEASE WITH ACUTE EXACERBATION (H): ICD-10-CM

## 2020-02-20 DIAGNOSIS — E66.813 CLASS 3 SEVERE OBESITY DUE TO EXCESS CALORIES WITH SERIOUS COMORBIDITY AND BODY MASS INDEX (BMI) OF 40.0 TO 44.9 IN ADULT (H): ICD-10-CM

## 2020-02-20 DIAGNOSIS — E11.9 TYPE 2 DIABETES MELLITUS WITHOUT COMPLICATION, WITHOUT LONG-TERM CURRENT USE OF INSULIN (H): ICD-10-CM

## 2020-02-20 LAB
ANION GAP SERPL CALCULATED.3IONS-SCNC: 10 MMOL/L (ref 5–18)
BUN SERPL-MCNC: 8 MG/DL (ref 8–22)
CALCIUM SERPL-MCNC: 9.8 MG/DL (ref 8.5–10.5)
CHLORIDE BLD-SCNC: 97 MMOL/L (ref 98–107)
CO2 SERPL-SCNC: 32 MMOL/L (ref 22–31)
CREAT SERPL-MCNC: 0.71 MG/DL (ref 0.6–1.1)
GFR SERPL CREATININE-BSD FRML MDRD: >60 ML/MIN/1.73M2
GLUCOSE BLD-MCNC: 131 MG/DL (ref 70–125)
POTASSIUM BLD-SCNC: 3.7 MMOL/L (ref 3.5–5)
SODIUM SERPL-SCNC: 139 MMOL/L (ref 136–145)

## 2020-02-20 RX ORDER — METFORMIN HCL 500 MG
TABLET, EXTENDED RELEASE 24 HR ORAL
Qty: 180 TABLET | Refills: 3 | Status: SHIPPED | OUTPATIENT
Start: 2020-02-20 | End: 2020-09-18

## 2020-02-23 LAB
ANNOTATION COMMENT IMP: NORMAL
METANEPHS SERPL-SCNC: 0.12 NMOL/L (ref 0–0.49)
NORMETANEPHRINE SERPL-SCNC: 0.8 NMOL/L (ref 0–0.89)

## 2020-03-04 ENCOUNTER — TELEPHONE (OUTPATIENT)
Dept: CARDIOLOGY | Facility: CLINIC | Age: 65
End: 2020-03-04

## 2020-03-04 DIAGNOSIS — R06.02 SOB (SHORTNESS OF BREATH): ICD-10-CM

## 2020-03-04 DIAGNOSIS — I27.20 PULMONARY HYPERTENSION (H): Primary | ICD-10-CM

## 2020-03-04 NOTE — TELEPHONE ENCOUNTER
Patient called concerned with regard to getting a new pulmonologist. Patient stated her current pulmonologist is going on an extended leave without a return date and patient wants to transfer her care to the , since she has her other physicians here. I placed a pulmonology referral for the [facility] and advised that they will be calling within the next few days to schedule an appointment. Patient verbalized understanding and did not have any further questions. Bindu Walden RN on 3/4/2020 at 10:14 AM

## 2020-03-05 ENCOUNTER — COMMUNICATION - HEALTHEAST (OUTPATIENT)
Dept: INTERNAL MEDICINE | Facility: CLINIC | Age: 65
End: 2020-03-05

## 2020-03-05 DIAGNOSIS — J44.9 COPD (CHRONIC OBSTRUCTIVE PULMONARY DISEASE) (H): Primary | ICD-10-CM

## 2020-03-06 ENCOUNTER — ANCILLARY PROCEDURE (OUTPATIENT)
Dept: GENERAL RADIOLOGY | Facility: CLINIC | Age: 65
End: 2020-03-06
Attending: INTERNAL MEDICINE
Payer: MEDICARE

## 2020-03-06 DIAGNOSIS — J44.9 COPD (CHRONIC OBSTRUCTIVE PULMONARY DISEASE) (H): ICD-10-CM

## 2020-03-06 NOTE — TELEPHONE ENCOUNTER
RECORDS RECEIVED FROM: internal    DATE RECEIVED: 3.9.20    NOTES STATUS DETAILS   OFFICE NOTE from referring provider Internal 3.4.20 Francisco J Edwards   OFFICE NOTE from other specialist Care Everywhere HE- 2.20.20 helen   12.11.19 tima   6.27.19 manohar      DISCHARGE SUMMARY from hospital N/A    DISCHARGE REPORT from the ER Internal 6.24.19    MEDICATION LIST Internal    IMAGING  (NEED IMAGES AND REPORTS)     CT SCAN Care Everywhere ce- 6.24.19,    CHEST XRAY (CXR) Care Everywhere He- 10/16/19, 5/28/19, 2/19/19   TESTS     PULMONARY FUNCTION TESTING (PFT) In process/CE 3.6.20- scheduled   HE- 8/21/19,        Action 3.6.20 sv    Action Taken Message sent to CC to call and schedule CXR

## 2020-03-07 LAB
DLCOUNC-%PRED-PRE: 92 %
DLCOUNC-PRE: 19.59 ML/MIN/MMHG
DLCOUNC-PRED: 21.12 ML/MIN/MMHG
ERV-%PRED-PRE: 45 %
ERV-PRE: 0.09 L
ERV-PRED: 0.2 L
EXPTIME-PRE: 9.56 SEC
FEF2575-%PRED-POST: 53 %
FEF2575-%PRED-PRE: 29 %
FEF2575-POST: 1.13 L/SEC
FEF2575-PRE: 0.62 L/SEC
FEF2575-PRED: 2.11 L/SEC
FEFMAX-%PRED-PRE: 72 %
FEFMAX-PRE: 4.5 L/SEC
FEFMAX-PRED: 6.24 L/SEC
FEV1-%PRED-PRE: 59 %
FEV1-PRE: 1.47 L
FEV1FEV6-PRE: 61 %
FEV1FEV6-PRED: 80 %
FEV1FVC-PRE: 59 %
FEV1FVC-PRED: 78 %
FEV1SVC-PRE: 55 %
FEV1SVC-PRED: 73 %
FIFMAX-PRE: 4.31 L/SEC
FRCPLETH-%PRED-PRE: 102 %
FRCPLETH-PRE: 2.88 L
FRCPLETH-PRED: 2.82 L
FVC-%PRED-PRE: 77 %
FVC-PRE: 2.48 L
FVC-PRED: 3.19 L
IC-%PRED-PRE: 80 %
IC-PRE: 2.57 L
IC-PRED: 3.2 L
RVPLETH-%PRED-PRE: 133 %
RVPLETH-PRE: 2.79 L
RVPLETH-PRED: 2.09 L
TLCPLETH-%PRED-PRE: 103 %
TLCPLETH-PRE: 5.45 L
TLCPLETH-PRED: 5.27 L
VA-%PRED-PRE: 81 %
VA-PRE: 4.2 L
VC-%PRED-PRE: 78 %
VC-PRE: 2.66 L
VC-PRED: 3.4 L

## 2020-03-08 ASSESSMENT — ENCOUNTER SYMPTOMS
COUGH: 1
MUSCLE CRAMPS: 0
WHEEZING: 1
ABDOMINAL PAIN: 1
SYNCOPE: 0
MYALGIAS: 1
SHORTNESS OF BREATH: 1
NAUSEA: 0
MUSCLE WEAKNESS: 1
SMELL DISTURBANCE: 0
CONSTIPATION: 0
POSTURAL DYSPNEA: 0
NECK MASS: 0
JAUNDICE: 0
BLOATING: 0
BOWEL INCONTINENCE: 0
DECREASED CONCENTRATION: 1
DYSPNEA ON EXERTION: 1
DEPRESSION: 1
RECTAL PAIN: 0
LEG PAIN: 1
STIFFNESS: 1
TROUBLE SWALLOWING: 0
NERVOUS/ANXIOUS: 1
DIARRHEA: 1
TINGLING: 1
VOMITING: 0
WEAKNESS: 1
EXERCISE INTOLERANCE: 1
HEADACHES: 0
HOARSE VOICE: 1
PANIC: 1
LOSS OF CONSCIOUSNESS: 0
SLEEP DISTURBANCES DUE TO BREATHING: 0
MEMORY LOSS: 0
SEIZURES: 0
SPUTUM PRODUCTION: 1
SORE THROAT: 1
NECK PAIN: 1
HEARTBURN: 1
INSOMNIA: 1
HYPERTENSION: 1
TASTE DISTURBANCE: 0
LIGHT-HEADEDNESS: 1
SINUS PAIN: 0
TREMORS: 0
SINUS CONGESTION: 1
HEMOPTYSIS: 0
ARTHRALGIAS: 1
PALPITATIONS: 0
COUGH DISTURBING SLEEP: 0
NUMBNESS: 1
DISTURBANCES IN COORDINATION: 1
ORTHOPNEA: 0
BLOOD IN STOOL: 0
BACK PAIN: 1
HYPOTENSION: 0
DIZZINESS: 1
PARALYSIS: 0
JOINT SWELLING: 0
SPEECH CHANGE: 0
SNORES LOUDLY: 1

## 2020-03-09 ENCOUNTER — RECORDS - HEALTHEAST (OUTPATIENT)
Dept: ADMINISTRATIVE | Facility: OTHER | Age: 65
End: 2020-03-09

## 2020-03-09 ENCOUNTER — PRE VISIT (OUTPATIENT)
Dept: PULMONOLOGY | Facility: CLINIC | Age: 65
End: 2020-03-09

## 2020-03-09 ENCOUNTER — OFFICE VISIT (OUTPATIENT)
Dept: PULMONOLOGY | Facility: CLINIC | Age: 65
End: 2020-03-09
Attending: INTERNAL MEDICINE
Payer: MEDICARE

## 2020-03-09 VITALS
DIASTOLIC BLOOD PRESSURE: 81 MMHG | BODY MASS INDEX: 44.52 KG/M2 | HEART RATE: 106 BPM | HEIGHT: 66 IN | SYSTOLIC BLOOD PRESSURE: 130 MMHG | RESPIRATION RATE: 17 BRPM | WEIGHT: 277 LBS | OXYGEN SATURATION: 93 %

## 2020-03-09 DIAGNOSIS — R06.02 SOB (SHORTNESS OF BREATH): ICD-10-CM

## 2020-03-09 DIAGNOSIS — J41.0 SIMPLE CHRONIC BRONCHITIS (H): ICD-10-CM

## 2020-03-09 DIAGNOSIS — I27.20 PULMONARY HYPERTENSION (H): ICD-10-CM

## 2020-03-09 DIAGNOSIS — J41.0 SIMPLE CHRONIC BRONCHITIS (H): Primary | ICD-10-CM

## 2020-03-09 DIAGNOSIS — E66.01 MORBID OBESITY (H): ICD-10-CM

## 2020-03-09 LAB
ANION GAP SERPL CALCULATED.3IONS-SCNC: 8 MMOL/L (ref 3–14)
BASOPHILS # BLD AUTO: 0 10E9/L (ref 0–0.2)
BASOPHILS NFR BLD AUTO: 0.3 %
BUN SERPL-MCNC: 8 MG/DL (ref 7–30)
CALCIUM SERPL-MCNC: 9.2 MG/DL (ref 8.5–10.1)
CHLORIDE SERPL-SCNC: 95 MMOL/L (ref 94–109)
CO2 SERPL-SCNC: 29 MMOL/L (ref 20–32)
CREAT SERPL-MCNC: 0.6 MG/DL (ref 0.52–1.04)
CREAT SERPL-MCNC: 0.6 MG/DL (ref 0.52–1.04)
DIFFERENTIAL METHOD BLD: ABNORMAL
EOSINOPHIL # BLD AUTO: 0.1 10E9/L (ref 0–0.7)
EOSINOPHIL NFR BLD AUTO: 1.5 %
ERYTHROCYTE [DISTWIDTH] IN BLOOD BY AUTOMATED COUNT: 14.8 % (ref 10–15)
GFR ESTIMATE EXT - HISTORICAL: >90 ML/MIN/1.73M2
GFR ESTIMATE, IF BLACK EXT - HISTORICAL: >90 ML/MIN/1.73M2
GFR SERPL CREATININE-BSD FRML MDRD: >90 ML/MIN/{1.73_M2}
GLUCOSE SERPL-MCNC: 166 MG/DL (ref 70–99)
HCT VFR BLD AUTO: 44.4 % (ref 35–47)
HGB BLD-MCNC: 14.3 G/DL (ref 11.7–15.7)
IMM GRANULOCYTES # BLD: 0 10E9/L (ref 0–0.4)
IMM GRANULOCYTES NFR BLD: 0.6 %
LYMPHOCYTES # BLD AUTO: 0.7 10E9/L (ref 0.8–5.3)
LYMPHOCYTES NFR BLD AUTO: 10.8 %
MCH RBC QN AUTO: 29 PG (ref 26.5–33)
MCHC RBC AUTO-ENTMCNC: 32.2 G/DL (ref 31.5–36.5)
MCV RBC AUTO: 90 FL (ref 78–100)
MONOCYTES # BLD AUTO: 0.4 10E9/L (ref 0–1.3)
MONOCYTES NFR BLD AUTO: 6.5 %
NEUTROPHILS # BLD AUTO: 5.4 10E9/L (ref 1.6–8.3)
NEUTROPHILS NFR BLD AUTO: 80.3 %
NRBC # BLD AUTO: 0 10*3/UL
NRBC BLD AUTO-RTO: 0 /100
PLATELET # BLD AUTO: 213 10E9/L (ref 150–450)
POTASSIUM SERPL-SCNC: 3 MMOL/L (ref 3.4–5.3)
PULMONARY FUNCTION TEST-FENO: 20 PPB (ref 0–40)
RBC # BLD AUTO: 4.93 10E12/L (ref 3.8–5.2)
SODIUM SERPL-SCNC: 133 MMOL/L (ref 133–144)
WBC # BLD AUTO: 6.8 10E9/L (ref 4–11)

## 2020-03-09 PROCEDURE — G0463 HOSPITAL OUTPT CLINIC VISIT: HCPCS | Mod: ZF

## 2020-03-09 PROCEDURE — 85025 COMPLETE CBC W/AUTO DIFF WBC: CPT

## 2020-03-09 ASSESSMENT — MIFFLIN-ST. JEOR: SCORE: 1813.21

## 2020-03-09 ASSESSMENT — PAIN SCALES - GENERAL: PAINLEVEL: NO PAIN (0)

## 2020-03-09 NOTE — PROGRESS NOTES
Pulmonary Clinic New Patient Consult  Reason for Consult: COPD  History of Present Illness    Ms. Cathy Cleary is a 64-year-old female with a past medical history of depression and anxiety, heavy alcohol use, severe sleep apnea and question obesity hypoventilation syndrome on CPAP, GERD with hiatal hernia, pulmonary hypertension, breast cancer status post lumpectomy, chemo and radiation, and COPD who presents to pulmonary clinic today for further evaluation and management of COPD.  Review of records suggest the patient was previously followed by pulmonary within the [facility] system.  She was last seen by them in late February and was started on Trelegy due to increased symptoms of cough, hoarseness, and shortness of breath.  Not suggest this was her second acute exacerbation of COPD within the last 3 months.  She had previously been maintained on Symbicort as needed and was also on Anoro for period of time.  Documentation additionally suggest she had gained 60 pounds over the preceding 2 years.  Due to receiving other care within the [facility] system and an anticipated leave for her local pulmonologist she requested transfer of care into our system.  Additional pertinent review of records as follows:    -CT PE protocol June 2019.  Negative for PE.  Prominence of pulmonary arteries possibly suggestive of pulmonary hypertension, stable 4 mm pleural-based nodule, diffuse air trapping, moderate hiatal hernia, and fatty liver.    - PFTs August 2019: Ratio 65%, FEV1 72%, FVC 87%, TLC 93%, %, DLCO 81%.  No response to inhaled bronchodilators.    -Echo August 2019: LVEF of 55 to 60%, mildly reduced RV function.    - October 2019, seen by Dr. Edwards in pulmonary hypertension clinic.  Right heart cath as reported in this note notable for PA pressure of 41/24/31 with a pulmonary capillary wedge pressure of 16.  Dr. Edawrds felt that her hemodynamics were not consistent with pure PAH.  He recommended diuresis and  improved blood pressure control.    Ms. Cathy Cleary was diagnosed with COPD about 3 years ago.  At that time she was having cough and recurrent episodes of bronchitis requiring treatment with steroids and antibiotics.  For a long time she was on Cheratussin which is 1 of the only things that helped with her cough.  Presently she takes Tessalon Perles for this.  She was started on Trelegy about 10 days ago and feels this is working better than the Anoro that she was previously on.  She still has congestion and cough productive of sputum which is a light green in color.  Her last course of prednisone finished 2 weeks ago.  Her last course before that was in December and she tells me that she needed a double course to get over her acute exacerbation then.  She nebs twice a day with albuterol.  She says her symptoms are worse in the fall and winter.  Viral URIs are clear trigger for her and she believes her  frequently brings these home as he drives school bus.  On the whole she feels that her breathing symptoms have worsened over the course of the last few years.  She has not felt entirely well since her pheochromocytoma surgery.  Worsening of symptoms also corresponds to weight gain of 60 to 70 pounds.  She previously lost this weight and noted that her shortness of breath was much improved when at a later weight.  At present she is able to walk from the clinic exam room to the elevator lobby but then would need to stop and rest.  Going back 1 year ago she believes that she would be able to walk further, although she weighed less than as well.  She tells me that she overall feels tired walking and frequently experiences burning in her legs.  She was recently referred to cardiac rehab but has taken a leave of absence from this secondary to lack of progress.  Denies previous personal history of asthma or allergies.  Does have acid reflux and takes daily PPI.    She is a previous smoker of 2 to 3 packs/day for 20  "years.  She quit altogether in 2000.  She lives in [place], [place] and has 1 cat at home.  She has been on disability for a while.  Denies any significant exposure to birds but does think she has down bedding.  No recent travel outside the area.    Family history is notable for a brother who was a smoker and had lots of problems with \"bronchitis\".    Review of Systems:  10 of 14 systems reviewed and are negative unless otherwise stated in HPI.    Past Medical History:   Diagnosis Date     Anxiety      Bipolar disorder (H)      Breast cancer (H) 1986    lumpectomy, radiation, chemo     COPD (chronic obstructive pulmonary disease) (H)     asthma     Depression, major, recurrent (H)      Drug tolerance     opioid     Esophageal reflux      Fatigue      Graves disease 1994     Hemochromatosis 02/14/2018    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; H63D not detected     Hyperlipidaemia      Hypertension      Impaired fasting glucose 2017     Joint pain      Morbid obesity (H)      KYAW (obstructive sleep apnea) 2016     Osteopenia      Pheochromocytoma, left 08/02/2017    laparoscopically removed     Postablative hypothyroidism 1995     Psoriasis      Psoriatic arthropathy (H)      RLS (restless legs syndrome)      Sacroiliitis (H)      Snoring      Spinal stenosis      Vitamin B 12 deficiency 2009     Vitamin D deficiency 2010       Past Surgical History:   Procedure Laterality Date     ARTHRODESIS ANKLE       ARTHROPLASTY ANKLE Right 6/29/2015    Procedure: ARTHROPLASTY ANKLE;  Surgeon: Jason Coughlin MD;  Location: [facility]     ARTHROPLASTY REVISION ANKLE Right 6/29/2015    Procedure: ARTHROPLASTY REVISION ANKLE;  Surgeon: Jason Coughlin MD;  Location: [facility]     BREAST BIOPSY, CORE RT/LT       COLONOSCOPY       CV CORONARY ANGIOGRAM N/A 10/3/2019    Procedure: CV CORONARY ANGIOGRAM;  Surgeon: Bryce Pierre MD;  Location:  HEART CARDIAC CATH LAB     CV RIGHT HEART CATH N/A 10/3/2019    Procedure: CV RIGHT HEART " CATH;  Surgeon: Bryce Pierre MD;  Location:  HEART CARDIAC CATH LAB     LAPAROSCOPIC ADRENALECTOMY Left 2017    pheochromocytoma     LENGTHEN TENDON ACHILLES Right 2015    Procedure: LENGTHEN TENDON ACHILLES;  Surgeon: Jason Coughlin MD;  Location: [facility]     LUMPECTOMY BREAST       MAMMOPLASTY REDUCTION Right 2015    [place]     MASTECTOMY MODIFIED RADICAL       REPAIR HAMMER TOE Right 2015    Procedure: REPAIR HAMMER TOE;  Surgeon: Jason Coughlin MD;  Location: [facility]     TONSILLECTOMY & ADENOIDECTOMY         Family History   Problem Relation Age of Onset     Lupus Sister      Hypertension Sister      Obesity Sister      Scleroderma Sister      Heart Failure Sister      Hemochromatosis Sister      Hemochromatosis Brother      Hypertension Brother      Hemochromatosis Father      Myocardial Infarction Father      Snoring Father      Obesity Mother      Thyroid Disease Mother      Adrenal Disorder No family hx of        Social History     Socioeconomic History     Marital status:      Spouse name: None     Number of children: None     Years of education: None     Highest education level: None   Occupational History     None   Social Needs     Financial resource strain: None     Food insecurity     Worry: None     Inability: None     Transportation needs     Medical: None     Non-medical: None   Tobacco Use     Smoking status: Former Smoker     Packs/day: 2.50     Years: 20.00     Pack years: 50.00     Types: Cigarettes     Last attempt to quit: 2000     Years since quittin.7     Smokeless tobacco: Never Used   Substance and Sexual Activity     Alcohol use: No     Comment: relapse 2019 sober      Drug use: No     Sexual activity: None   Lifestyle     Physical activity     Days per week: None     Minutes per session: None     Stress: None   Relationships     Social connections     Talks on phone: None     Gets together: None     Attends Rastafari service:  "None     Active member of club or organization: None     Attends meetings of clubs or organizations: None     Relationship status: None     Intimate partner violence     Fear of current or ex partner: None     Emotionally abused: None     Physically abused: None     Forced sexual activity: None   Other Topics Concern     Parent/sibling w/ CABG, MI or angioplasty before 65F 55M? Not Asked   Social History Narrative     None         Allergies   Allergen Reactions     Gabapentin      Drove on the wrong side of the highway     Levofloxacin      \"CAN'T REMEMBER\"     Penicillins      \"SORES IN MOUTH\"     Sulfa Drugs      \"PT DOES NOT KNOW WHAT THE REACTION WAS\"         Current Outpatient Medications:      albuterol (ACCUNEB) 0.63 MG/3ML neb solution, Take 1 ampule by nebulization every 6 hours as needed for shortness of breath / dyspnea or wheezing, Disp: , Rfl:      ASPIRIN PO, Take 81 mg by mouth 2 times daily , Disp: , Rfl:      benzonatate (TESSALON) 100 MG capsule, TAKE 1 CAPSULE BY MOUTH THREE TIMES A DAY AS NEEDED FOR COUGH, Disp: , Rfl:      BUTRANS 10 MCG/HR WK patch, PLACE 1 PATCH ON THE SKIN EVERY 7 DAYS., Disp: , Rfl: 2     Cholecalciferol (VITAMIN D3) 250 MCG (43043 UT) TABS, Take 1 tablet by mouth daily 10172/day, Disp: , Rfl:      Cyanocobalamin (VITAMIN B-12) 5000 MCG SUBL, Unknown dose. 2 or 3 sprays/day, Disp: , Rfl:      Desvenlafaxine Succinate (PRISTIQ PO), Take 100 mg by mouth daily , Disp: , Rfl:      diclofenac (VOLTAREN) 1 % topical gel, Place onto the skin 4 times daily, Disp: , Rfl:      furosemide (LASIX) 20 MG tablet, Take 2 tablets (40 mg) by mouth daily, Disp: 180 tablet, Rfl: 3     hydrOXYzine (VISTARIL) 25 MG capsule, 25 mg 3 times daily as needed , Disp: , Rfl: 0     levothyroxine (SYNTHROID/LEVOTHROID) 175 MCG tablet, Take 1 tablet (175 mcg) by mouth daily, Disp: 90 tablet, Rfl: 4     losartan (COZAAR) 25 MG tablet, TAKE 1 TABLET BY MOUTH EVERY DAY, Disp: 90 tablet, Rfl: 1     metFORMIN " "500 MG PO 24 hr tablet, Take 1 tablet by mouth with dinner  X 2 weeks , then 2 tablets X 30 days and if tolerated increase to  3 tablets daily, Disp: 180 tablet, Rfl: 3     Omeprazole (PRILOSEC PO), Take 20 mg by mouth 2 times daily (before meals), Disp: , Rfl:      potassium chloride ER (K-DUR/KLOR-CON M) 20 MEQ CR tablet, Take 1 tablet (20 mEq) by mouth daily, Disp: 91 tablet, Rfl: 3     Pramipexole Dihydrochloride (MIRAPEX PO), Take 1 mg by mouth 2 times daily , Disp: , Rfl:      ANORO ELLIPTA 62.5-25 MCG/INH oral inhaler, TAKE 1 PUFF BY MOUTH EVERY DAY, Disp: , Rfl: 6      Physical Exam:  /81   Pulse 106   Resp 17   Ht 1.676 m (5' 6\")   Wt 125.6 kg (277 lb)   SpO2 93%   BMI 44.71 kg/m    GENERAL: Well developed, well nourished, alert, and in no apparent distress.  HEENT: Normocephalic, atraumatic. PERRL, EOMI. Oral mucosa is moist. No perioral cyanosis.  NECK: supple, no masses, no thyromegaly.  RESP:  Normal respiratory effort.  CTAB.  No rales, wheezes, rhonchi.  No cyanosis or clubbing.  CV: Normal S1, S2, regular rhythm, normal rate. No murmur.  Trace LE edema.   ABDOMEN:  non-distended.   SKIN: warm and dry. No rash.  NEURO: AAOx3.  Normal gait.  Fluent speech.  PSYCH: mentation appears normal.     Results:  PFTs: Ratio 59%, FVC 77%, FEV1 59%, %, %, DLCO 92%.  Study demonstrates a moderate obstructive ventilatory defect with significant improvement following inhaled bronchodilators (250 mL / 17%).  Normal diffusion capacity.  When compared to previous pulmonary function testing obtained outside of our system postbronchodilator FEV1 is overall stable.  Imaging (personally reviewed in clinic today):  CXR: No acute cardiopulmonary findings.    Assessment and Plan:   Randi Cleary is a 64 year old female with a history of depression and anxiety, heavy alcohol use, severe sleep apnea and question obesity hypoventilation syndrome on CPAP, GERD with hiatal hernia, pulmonary " hypertension, breast cancer status post lumpectomy, chemo and radiation, and COPD who presents to pulmonary clinic today for further evaluation and management of COPD.   1. COPD.  Obstruction is moderate by post-bronchodilator FEV1.  It sounds like she has had difficulty with recurrent exacerbations as of late.  She was recently stepped up from Anoro to Trelegy with some improvement in symptoms.  She has only been taking Trelegy for about 10 days so it is a bit soon to see what the full magnitude of response will be.  I would like for her to continue on triple therapy with Trelegy at this time.  Significant response to inhaled bronchodilators seen on PFTs today does raise the question of possible concurrent asthma.  For further evaluation of this, I would like to obtain CBC with diff to look for eosinophilia as well as FeNO.  If significant eosinophilia is present, we may need to consider escalation to a higher dose of ICS.  If no evidence of significant eosinophilia and there continues to be issues with recurrent exacerbation either a trial of roflumilast or chronic azithromycin would be a reasonable next step.  2. Severe KYAW.  Followed by sleep clinic, last seen 12/2019. CPAP therapy recommended.  Continue to follow up with them.  3. Obesity and likely deconditioning.  Regular aerobic exercise and weight loss are encouraged and recommended.  Agree that she would benefit from participation in cardiopulmonary rehab, she should resume this as soon as she is able.  4. Lung Cancer Screening.  Last CT from August 2019 without evidence of pulmonary nodules.  Plan to continue annual surveillance, next due August 2021.  Questions and concerns were answered to the patient's satisfaction.  she was provided with my contact information should new questions or concerns arise in the interim.  she should return to clinic in 6 months for follow up.  Up to date on Pneumovax (2000), Prevnar (2014) and an annual flu vaccine.  [place]  MD [facility]  Pulmonary and Critical Care Medicine    The above note was dictated using voice recognition software and may include typographical errors. Please contact the author for any clarifications.

## 2020-03-09 NOTE — NURSING NOTE
Chief Complaint   Patient presents with     Consult     SOB     Medications reviewed and vital signs taken.   Joycelyn Hayden CMA

## 2020-03-09 NOTE — LETTER
3/9/2020       RE: Randi Cleary  [address]  [facility] [address]     Dear Colleague,    Thank you for referring your patient, Randi Cleary, to the [facility]G [facility] at [facility]. Please see a copy of my visit note below.    Pulmonary Clinic New Patient Consult  Reason for Consult: COPD  History of Present Illness    Ms. Cathy Cleary is a 64-year-old female with a past medical history of depression and anxiety, heavy alcohol use, severe sleep apnea and question obesity hypoventilation syndrome on CPAP, GERD with hiatal hernia, pulmonary hypertension, breast cancer status post lumpectomy, chemo and radiation, and COPD who presents to pulmonary clinic today for further evaluation and management of COPD.  Review of records suggest the patient was previously followed by pulmonary within the [facility] system.  She was last seen by them in late February and was started on Trelegy due to increased symptoms of cough, hoarseness, and shortness of breath.  Not suggest this was her second acute exacerbation of COPD within the last 3 months.  She had previously been maintained on Symbicort as needed and was also on Anoro for period of time.  Documentation additionally suggest she had gained 60 pounds over the preceding 2 years.  Due to receiving other care within the [facility] system and an anticipated leave for her local pulmonologist she requested transfer of care into our system.  Additional pertinent review of records as follows:    -CT PE protocol June 2019.  Negative for PE.  Prominence of pulmonary arteries possibly suggestive of pulmonary hypertension, stable 4 mm pleural-based nodule, diffuse air trapping, moderate hiatal hernia, and fatty liver.    - PFTs August 2019: Ratio 65%, FEV1 72%, FVC 87%, TLC 93%, %, DLCO 81%.  No response to inhaled bronchodilators.    -Echo August 2019: LVEF of 55 to 60%, mildly reduced RV  function.    - October 2019, seen by Dr. Edwards in pulmonary hypertension clinic.  Right heart cath as reported in this note notable for PA pressure of 41/24/31 with a pulmonary capillary wedge pressure of 16.  Dr. Edwards felt that her hemodynamics were not consistent with pure PAH.  He recommended diuresis and improved blood pressure control.    Ms. Cathy Cleary was diagnosed with COPD about 3 years ago.  At that time she was having cough and recurrent episodes of bronchitis requiring treatment with steroids and antibiotics.  For a long time she was on Cheratussin which is 1 of the only things that helped with her cough.  Presently she takes Tessalon Perles for this.  She was started on Trelegy about 10 days ago and feels this is working better than the Anoro that she was previously on.  She still has congestion and cough productive of sputum which is a light green in color.  Her last course of prednisone finished 2 weeks ago.  Her last course before that was in December and she tells me that she needed a double course to get over her acute exacerbation then.  She nebs twice a day with albuterol.  She says her symptoms are worse in the fall and winter.  Viral URIs are clear trigger for her and she believes her  frequently brings these home as he drives school bus.  On the whole she feels that her breathing symptoms have worsened over the course of the last few years.  She has not felt entirely well since her pheochromocytoma surgery.  Worsening of symptoms also corresponds to weight gain of 60 to 70 pounds.  She previously lost this weight and noted that her shortness of breath was much improved when at a later weight.  At present she is able to walk from the clinic exam room to the elevator lobby but then would need to stop and rest.  Going back 1 year ago she believes that she would be able to walk further, although she weighed less than as well.  She tells me that she overall feels tired walking and  "frequently experiences burning in her legs.  She was recently referred to cardiac rehab but has taken a leave of absence from this secondary to lack of progress.  Denies previous personal history of asthma or allergies.  Does have acid reflux and takes daily PPI.    She is a previous smoker of 2 to 3 packs/day for 20 years.  She quit altogether in 2000.  She lives in [place], [place] and has 1 cat at home.  She has been on disability for a while.  Denies any significant exposure to birds but does think she has down bedding.  No recent travel outside the area.    Family history is notable for a brother who was a smoker and had lots of problems with \"bronchitis\".    Review of Systems:  10 of 14 systems reviewed and are negative unless otherwise stated in HPI.    Past Medical History:   Diagnosis Date     Anxiety      Bipolar disorder (H)      Breast cancer (H) 1986    lumpectomy, radiation, chemo     COPD (chronic obstructive pulmonary disease) (H)     asthma     Depression, major, recurrent (H)      Drug tolerance     opioid     Esophageal reflux      Fatigue      Graves disease 1994     Hemochromatosis 02/14/2018    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; H63D not detected     Hyperlipidaemia      Hypertension      Impaired fasting glucose 2017     Joint pain      Morbid obesity (H)      KYAW (obstructive sleep apnea) 2016     Osteopenia      Pheochromocytoma, left 08/02/2017    laparoscopically removed     Postablative hypothyroidism 1995     Psoriasis      Psoriatic arthropathy (H)      RLS (restless legs syndrome)      Sacroiliitis (H)      Snoring      Spinal stenosis      Vitamin B 12 deficiency 2009     Vitamin D deficiency 2010       Past Surgical History:   Procedure Laterality Date     ARTHRODESIS ANKLE       ARTHROPLASTY ANKLE Right 6/29/2015    Procedure: ARTHROPLASTY ANKLE;  Surgeon: Jason Coughlin MD;  Location: [facility]     ARTHROPLASTY REVISION ANKLE Right 6/29/2015    Procedure: ARTHROPLASTY REVISION ANKLE;  " Surgeon: Jason Coughlin MD;  Location: [facility]     BREAST BIOPSY, CORE RT/LT       COLONOSCOPY       CV CORONARY ANGIOGRAM N/A 10/3/2019    Procedure: CV CORONARY ANGIOGRAM;  Surgeon: Bryce Pierre MD;  Location:  HEART CARDIAC CATH LAB     CV RIGHT HEART CATH N/A 10/3/2019    Procedure: CV RIGHT HEART CATH;  Surgeon: Bryce Pierre MD;  Location:  HEART CARDIAC CATH LAB     LAPAROSCOPIC ADRENALECTOMY Left 2017    pheochromocytoma     LENGTHEN TENDON ACHILLES Right 2015    Procedure: LENGTHEN TENDON ACHILLES;  Surgeon: Jason Coughlin MD;  Location: [facility]     LUMPECTOMY BREAST       MAMMOPLASTY REDUCTION Right 2015    [place]     MASTECTOMY MODIFIED RADICAL       REPAIR HAMMER TOE Right 2015    Procedure: REPAIR HAMMER TOE;  Surgeon: Jason Coughlin MD;  Location: [facility]     TONSILLECTOMY & ADENOIDECTOMY         Family History   Problem Relation Age of Onset     Lupus Sister      Hypertension Sister      Obesity Sister      Scleroderma Sister      Heart Failure Sister      Hemochromatosis Sister      Hemochromatosis Brother      Hypertension Brother      Hemochromatosis Father      Myocardial Infarction Father      Snoring Father      Obesity Mother      Thyroid Disease Mother      Adrenal Disorder No family hx of        Social History     Socioeconomic History     Marital status:      Spouse name: None     Number of children: None     Years of education: None     Highest education level: None   Occupational History     None   Social Needs     Financial resource strain: None     Food insecurity     Worry: None     Inability: None     Transportation needs     Medical: None     Non-medical: None   Tobacco Use     Smoking status: Former Smoker     Packs/day: 2.50     Years: 20.00     Pack years: 50.00     Types: Cigarettes     Last attempt to quit: 2000     Years since quittin.7     Smokeless tobacco: Never Used   Substance and Sexual Activity      "Alcohol use: No     Comment: relapse 11/2019 sober      Drug use: No     Sexual activity: None   Lifestyle     Physical activity     Days per week: None     Minutes per session: None     Stress: None   Relationships     Social connections     Talks on phone: None     Gets together: None     Attends Scientology service: None     Active member of club or organization: None     Attends meetings of clubs or organizations: None     Relationship status: None     Intimate partner violence     Fear of current or ex partner: None     Emotionally abused: None     Physically abused: None     Forced sexual activity: None   Other Topics Concern     Parent/sibling w/ CABG, MI or angioplasty before 65F 55M? Not Asked   Social History Narrative     None         Allergies   Allergen Reactions     Gabapentin      Drove on the wrong side of the highway     Levofloxacin      \"CAN'T REMEMBER\"     Penicillins      \"SORES IN MOUTH\"     Sulfa Drugs      \"PT DOES NOT KNOW WHAT THE REACTION WAS\"         Current Outpatient Medications:      albuterol (ACCUNEB) 0.63 MG/3ML neb solution, Take 1 ampule by nebulization every 6 hours as needed for shortness of breath / dyspnea or wheezing, Disp: , Rfl:      ASPIRIN PO, Take 81 mg by mouth 2 times daily , Disp: , Rfl:      benzonatate (TESSALON) 100 MG capsule, TAKE 1 CAPSULE BY MOUTH THREE TIMES A DAY AS NEEDED FOR COUGH, Disp: , Rfl:      BUTRANS 10 MCG/HR WK patch, PLACE 1 PATCH ON THE SKIN EVERY 7 DAYS., Disp: , Rfl: 2     Cholecalciferol (VITAMIN D3) 250 MCG (96954 UT) TABS, Take 1 tablet by mouth daily 45216/day, Disp: , Rfl:      Cyanocobalamin (VITAMIN B-12) 5000 MCG SUBL, Unknown dose. 2 or 3 sprays/day, Disp: , Rfl:      Desvenlafaxine Succinate (PRISTIQ PO), Take 100 mg by mouth daily , Disp: , Rfl:      diclofenac (VOLTAREN) 1 % topical gel, Place onto the skin 4 times daily, Disp: , Rfl:      furosemide (LASIX) 20 MG tablet, Take 2 tablets (40 mg) by mouth daily, Disp: 180 tablet, Rfl: " "3     hydrOXYzine (VISTARIL) 25 MG capsule, 25 mg 3 times daily as needed , Disp: , Rfl: 0     levothyroxine (SYNTHROID/LEVOTHROID) 175 MCG tablet, Take 1 tablet (175 mcg) by mouth daily, Disp: 90 tablet, Rfl: 4     losartan (COZAAR) 25 MG tablet, TAKE 1 TABLET BY MOUTH EVERY DAY, Disp: 90 tablet, Rfl: 1     metFORMIN 500 MG PO 24 hr tablet, Take 1 tablet by mouth with dinner  X 2 weeks , then 2 tablets X 30 days and if tolerated increase to  3 tablets daily, Disp: 180 tablet, Rfl: 3     Omeprazole (PRILOSEC PO), Take 20 mg by mouth 2 times daily (before meals), Disp: , Rfl:      potassium chloride ER (K-DUR/KLOR-CON M) 20 MEQ CR tablet, Take 1 tablet (20 mEq) by mouth daily, Disp: 91 tablet, Rfl: 3     Pramipexole Dihydrochloride (MIRAPEX PO), Take 1 mg by mouth 2 times daily , Disp: , Rfl:      ANORO ELLIPTA 62.5-25 MCG/INH oral inhaler, TAKE 1 PUFF BY MOUTH EVERY DAY, Disp: , Rfl: 6      Physical Exam:  /81   Pulse 106   Resp 17   Ht 1.676 m (5' 6\")   Wt 125.6 kg (277 lb)   SpO2 93%   BMI 44.71 kg/m    GENERAL: Well developed, well nourished, alert, and in no apparent distress.  HEENT: Normocephalic, atraumatic. PERRL, EOMI. Oral mucosa is moist. No perioral cyanosis.  NECK: supple, no masses, no thyromegaly.  RESP:  Normal respiratory effort.  CTAB.  No rales, wheezes, rhonchi.  No cyanosis or clubbing.  CV: Normal S1, S2, regular rhythm, normal rate. No murmur.  Trace LE edema.   ABDOMEN:  non-distended.   SKIN: warm and dry. No rash.  NEURO: AAOx3.  Normal gait.  Fluent speech.  PSYCH: mentation appears normal.     Results:  PFTs: Ratio 59%, FVC 77%, FEV1 59%, %, %, DLCO 92%.  Study demonstrates a moderate obstructive ventilatory defect with significant improvement following inhaled bronchodilators (250 mL / 17%).  Normal diffusion capacity.  When compared to previous pulmonary function testing obtained outside of our system postbronchodilator FEV1 is overall stable.  Imaging " (personally reviewed in clinic today):  CXR: No acute cardiopulmonary findings.    Assessment and Plan:   Randi Cleary is a 64 year old female with a history of depression and anxiety, heavy alcohol use, severe sleep apnea and question obesity hypoventilation syndrome on CPAP, GERD with hiatal hernia, pulmonary hypertension, breast cancer status post lumpectomy, chemo and radiation, and COPD who presents to pulmonary clinic today for further evaluation and management of COPD.   1. COPD.  Obstruction is moderate by post-bronchodilator FEV1.  It sounds like she has had difficulty with recurrent exacerbations as of late.  She was recently stepped up from Anoro to Trelegy with some improvement in symptoms.  She has only been taking Trelegy for about 10 days so it is a bit soon to see what the full magnitude of response will be.  I would like for her to continue on triple therapy with Trelegy at this time.  Significant response to inhaled bronchodilators seen on PFTs today does raise the question of possible concurrent asthma.  For further evaluation of this, I would like to obtain CBC with diff to look for eosinophilia as well as FeNO.  If significant eosinophilia is present, we may need to consider escalation to a higher dose of ICS.  If no evidence of significant eosinophilia and there continues to be issues with recurrent exacerbation either a trial of roflumilast or chronic azithromycin would be a reasonable next step.  2. Severe KYAW.  Followed by sleep clinic, last seen 12/2019. CPAP therapy recommended.  Continue to follow up with them.  3. Obesity and likely deconditioning.  Regular aerobic exercise and weight loss are encouraged and recommended.  Agree that she would benefit from participation in cardiopulmonary rehab, she should resume this as soon as she is able.  4. Lung Cancer Screening.  Last CT from August 2019 without evidence of pulmonary nodules.  Plan to continue annual surveillance, next due  August 2021.  Questions and concerns were answered to the patient's satisfaction.  she was provided with my contact information should new questions or concerns arise in the interim.  she should return to clinic in 6 months for follow up.  Up to date on Pneumovax (2000), Prevnar (2014) and an annual flu vaccine.  Flower Medrano MD  Pulmonary and Critical Care Medicine    The above note was dictated using voice recognition software and may include typographical errors. Please contact the author for any clarifications.

## 2020-03-10 ENCOUNTER — TELEPHONE (OUTPATIENT)
Dept: CARDIOLOGY | Facility: CLINIC | Age: 65
End: 2020-03-10

## 2020-03-10 NOTE — TELEPHONE ENCOUNTER
Called patient about her low potassium lab results. Asked the patient to take an extra 20 mEq potassium for the next three days and add more potassium rich foods into her diet this week. We will recheck labs next week when patient is scheduled to see Dr. Edwards. Bindu Walden RN on 3/10/2020 at 10:00 AM

## 2020-03-12 ENCOUNTER — COMMUNICATION - HEALTHEAST (OUTPATIENT)
Dept: PALLIATIVE MEDICINE | Facility: OTHER | Age: 65
End: 2020-03-12

## 2020-03-12 DIAGNOSIS — E78.5 DYSLIPIDEMIA: ICD-10-CM

## 2020-03-12 DIAGNOSIS — R06.02 SOB (SHORTNESS OF BREATH): ICD-10-CM

## 2020-03-12 DIAGNOSIS — M25.551 PAIN IN JOINT INVOLVING RIGHT PELVIC REGION AND THIGH: ICD-10-CM

## 2020-03-12 DIAGNOSIS — I27.20 PULMONARY HYPERTENSION (H): ICD-10-CM

## 2020-03-12 RX ORDER — LOSARTAN POTASSIUM 25 MG/1
25 TABLET ORAL DAILY
Qty: 90 TABLET | Refills: 3 | Status: SHIPPED | OUTPATIENT
Start: 2020-03-12 | End: 2021-03-31

## 2020-03-12 RX ORDER — POTASSIUM CHLORIDE 1500 MG/1
20 TABLET, EXTENDED RELEASE ORAL DAILY
Qty: 90 TABLET | Refills: 3 | Status: SHIPPED | OUTPATIENT
Start: 2020-03-12 | End: 2022-09-26

## 2020-03-13 ENCOUNTER — COMMUNICATION - HEALTHEAST (OUTPATIENT)
Dept: INTERNAL MEDICINE | Facility: CLINIC | Age: 65
End: 2020-03-13

## 2020-03-13 DIAGNOSIS — K21.00 GASTROESOPHAGEAL REFLUX DISEASE WITH ESOPHAGITIS: ICD-10-CM

## 2020-03-15 ENCOUNTER — HEALTH MAINTENANCE LETTER (OUTPATIENT)
Age: 65
End: 2020-03-15

## 2020-03-16 NOTE — PROGRESS NOTES
Francisco J Edwards M.D.  Cardiovascular Medicine    I had a 30 minute virtual visit with the patient.  The patient is contacted s for follow-up of heart failure.  There is no interim history of chest pain, tightness, paroxysmal nocturnal dyspnea, orthopnea, peripheral edema, palpitation, pre-syncope, syncope, device discharge.  Exercise tolerance is stable but poor.  The patient was attempting to exercise regularly and following a sodium restricted, calorically appropriate diet. But this has been a struggle for her.  Medications are reviewed and the patient is taking medications as prescribed.  The patient is generally sleeping well.  She has a great deal of anxiety and finally has scheduled an appointment with a psychologist.  She is doing adequately from a cardiac point of view.  She will have laboratories repeated tomorrow (BMP)  She reports that her blood pressure control has improved.    Patient gave permission    Loida Stockton MD    [address], [place] [id number] [phone] (Fax)          Reina Morillo MD    [address] [place] [address] [phone]     Problem List  1. Possible pulmonary hypertension  2. History of breast cancer              History of mastectomy              Breast reconstruction  3. Atherosclerosis with coronary calcifications  4. Hiatal hernia  5. Hepatic steatosis  6. Diverticulosis  7. KYAW with treatment  8. Psoriasis  9. Grave's disease with ablation  10. Hypertension  11. Asthma  12. Hemochromatosis,gene +  13. Bipolar disorder ?  14. Chronic pain with narcotic dependence  15. Pheochromocytoma (right surgically removed)  16. Elevated body mass index  17. Elevate hemoglobin with macrocytosis  18  Hiatal hernia  19. Chronic pain management               Wt Readings from Last 24 Encounters:   03/09/20 125.6 kg (277 lb)   02/12/20 125.6 kg (277 lb)   12/11/19 119.3 kg (263 lb)   11/18/19 122.5 kg (270 lb)   10/22/19  120.2 kg (265 lb)   09/25/19 118.5 kg (261 lb 3.2 oz)   09/24/19 119 kg (262 lb 4.8 oz)   09/19/19 119.4 kg (263 lb 3.2 oz)   09/13/19 120.7 kg (266 lb 1.6 oz)   09/04/19 118.9 kg (262 lb 3.2 oz)   08/13/19 117.5 kg (259 lb)   06/29/15 92.1 kg (203 lb)       Meds  Current Outpatient Medications   Medication     albuterol (ACCUNEB) 0.63 MG/3ML neb solution     ASPIRIN PO     benzonatate (TESSALON) 100 MG capsule     BUTRANS 10 MCG/HR WK patch     Cholecalciferol (VITAMIN D3) 250 MCG (39504 UT) TABS     Cyanocobalamin (VITAMIN B-12) 5000 MCG SUBL     Desvenlafaxine Succinate (PRISTIQ PO)     diclofenac (VOLTAREN) 1 % topical gel     furosemide (LASIX) 20 MG tablet     hydrOXYzine (VISTARIL) 25 MG capsule     levothyroxine (SYNTHROID/LEVOTHROID) 175 MCG tablet     losartan (COZAAR) 25 MG tablet     metFORMIN 500 MG PO 24 hr tablet     Omeprazole (PRILOSEC PO)     potassium chloride ER (KLOR-CON M) 20 MEQ CR tablet     Pramipexole Dihydrochloride (MIRAPEX PO)     No current facility-administered medications for this visit.          Labs  Results for RYAN CAGLEGEETA MESA (MRN [id number]) as of 3/16/2020 17:42   Ref. Range 3/9/2020 12:01   Sodium Latest Ref Range: 133 - 144 mmol/L 133   Potassium Latest Ref Range: 3.4 - 5.3 mmol/L 3.0 (L)   Chloride Latest Ref Range: 94 - 109 mmol/L 95   Carbon Dioxide Latest Ref Range: 20 - 32 mmol/L 29   Urea Nitrogen Latest Ref Range: 7 - 30 mg/dL 8   Creatinine Latest Ref Range: 0.52 - 1.04 mg/dL 0.60   GFR Estimate Latest Ref Range: >60 mL/min/1.73_m2 >90   GFR Estimate If Black Latest Ref Range: >60 mL/min/1.73_m2 >90   Calcium Latest Ref Range: 8.5 - 10.1 mg/dL 9.2   Anion Gap Latest Ref Range: 3 - 14 mmol/L 8   Glucose Latest Ref Range: 70 - 99 mg/dL 166 (H)   WBC Latest Ref Range: 4.0 - 11.0 10e9/L 6.8   Hemoglobin Latest Ref Range: 11.7 - 15.7 g/dL 14.3   Hematocrit Latest Ref Range: 35.0 - 47.0 % 44.4   Platelet Count Latest Ref Range: 150 - 450 10e9/L 213   RBC Count Latest Ref  Range: 3.8 - 5.2 10e12/L 4.93   MCV Latest Ref Range: 78 - 100 fl 90   MCH Latest Ref Range: 26.5 - 33.0 pg 29.0   MCHC Latest Ref Range: 31.5 - 36.5 g/dL 32.2   RDW Latest Ref Range: 10.0 - 15.0 % 14.8   Diff Method Unknown Automated Method   % Neutrophils Latest Units: % 80.3   % Lymphocytes Latest Units: % 10.8   % Monocytes Latest Units: % 6.5   % Eosinophils Latest Units: % 1.5   % Basophils Latest Units: % 0.3   % Immature Granulocytes Latest Units: % 0.6   Nucleated RBCs Latest Ref Range: 0 /100 0   Absolute Neutrophil Latest Ref Range: 1.6 - 8.3 10e9/L 5.4   Absolute Lymphocytes Latest Ref Range: 0.8 - 5.3 10e9/L 0.7 (L)   Absolute Monocytes Latest Ref Range: 0.0 - 1.3 10e9/L 0.4   Absolute Eosinophils Latest Ref Range: 0.0 - 0.7 10e9/L 0.1   Absolute Basophils Latest Ref Range: 0.0 - 0.2 10e9/L 0.0   Abs Immature Granulocytes Latest Ref Range: 0 - 0.4 10e9/L 0.0   Absolute Nucleated RBC Unknown 0.0     Imaging   Right Heart Pressures     Phase: Baseline    RA  A-wave: 16 mmHg  V-wave: 16 mmHg  Mean: 15 mmHg   HR: 88 bpm  RV  Systolic: 44 mmHg  End Diastolic: 16 mmHg  HR: 88 bpm    PA  Systolic:41 mmHg  Diasotlic: 24 mmHg  Mean: 31 mmHg  HR: 88 bpm  PA Sat: 70.8%    PCW  A-wave: 18 mmHg  V-wave: 17 mmHg  Mean: 16 mmHg  HR: 91 bpm    Cardiac Output  CO Jeannie: 5.72 L/min  CI Jeannie: 2.56 L/min/m2  CO TD: Not performed  CI TD: Not performed     Vitals  BP: 168 mmHg / 74 mmHg  SpO2: 92 %  PA Stat: 70.8 %    Resistance Metric  PVR index: 5.86 QUIROZ/m2       Assessment/Plan     The patient has evidence of volume overload and poorly controlled blood pressure.      The hemodynamics are not consistent with pure pulmonary arterial hypertension.  With the elevated right atrial pressure and pulmonary capillary pressure and normal cardiac output her pulmonary vascular resistance ( an indicator of pulmonary arterial disease), I think her most appropriate treatment would be diuretic to lower intravascular volume status and  better control of her blood pressure which is consistently high here.  Therefore, I would restart lisinopril 5mgs once daily and recheck basic metabolic panel in 3-4 weeks to assess potassium in view of the stopping maxizide and starting furosemide 40 mgs once daily.    I would then increase lisinopril to better control blood pressure.         CC: physicians above and    Dusty Dubois M.D.  ([facility]      Answers for HPI/ROS submitted by the patient on 3/8/2020   General Symptoms: No  Skin Symptoms: No  HENT Symptoms: Yes  EYE SYMPTOMS: No  HEART SYMPTOMS: Yes  LUNG SYMPTOMS: Yes  INTESTINAL SYMPTOMS: Yes  URINARY SYMPTOMS: No  GYNECOLOGIC SYMPTOMS: No  BREAST SYMPTOMS: No  SKELETAL SYMPTOMS: Yes  BLOOD SYMPTOMS: No  NERVOUS SYSTEM SYMPTOMS: Yes  MENTAL HEALTH SYMPTOMS: Yes  Ear pain: No  Ear discharge: No  Hearing loss: No  Tinnitus: Yes  Nosebleeds: No  Congestion: Yes  Sinus pain: No  Trouble swallowing: No   Voice hoarseness: Yes  Mouth sores: No  Sore throat: Yes  Tooth pain: No  Gum tenderness: No  Bleeding gums: No  Change in taste: No  Change in sense of smell: No  Dry mouth: No  Hearing aid used: No  Neck lump: No  Cough: Yes  Sputum or phlegm: Yes  Coughing up blood: No  Difficulty breating or shortness of breath: Yes  Snoring: Yes  Wheezing: Yes  Difficulty breathing on exertion: Yes  Nighttime Cough: No  Difficulty breathing when lying flat: No  Chest pain or pressure: No  Fast or irregular heartbeat: No  Pain in legs with walking: Yes  Trouble breathing while lying down: No  Fingers or toes appear blue: No  High blood pressure: Yes  Low blood pressure: No  Fainting: No  Murmurs: No  Pacemaker: No  Varicose veins: No  Edema or swelling: Yes  Wake up at night with shortness of breath: No  Light-headedness: Yes  Exercise intolerance: Yes  Heart burn or indigestion: Yes  Nausea: No  Vomiting: No  Abdominal pain: Yes  Bloating: No  Constipation: No  Diarrhea: Yes  Blood  in stool: No  Black stools: No  Rectal or Anal pain: No  Fecal incontinence: No  Yellowing of skin or eyes: No  Vomit with blood: No  Change in stools: Yes  Back pain: Yes  Muscle aches: Yes  Neck pain: Yes  Swollen joints: No  Joint pain: Yes  Bone pain: No  Muscle cramps: No  Muscle weakness: Yes  Joint stiffness: Yes  Bone fracture: No  Trouble with coordination: Yes  Dizziness or trouble with balance: Yes  Fainting or black-out spells: No  Memory loss: No  Headache: No  Seizures: No  Speech problems: No  Tingling: Yes  Tremor: No  Weakness: Yes  Difficulty walking: Yes  Paralysis: No  Numbness: Yes  Nervous or Anxious: Yes  Depression: Yes  Trouble sleeping: Yes  Trouble thinking or concentrating: Yes  Mood changes: No  Panic attacks: Yes

## 2020-03-17 ENCOUNTER — TELEPHONE (OUTPATIENT)
Dept: CARDIOLOGY | Facility: CLINIC | Age: 65
End: 2020-03-17

## 2020-03-17 DIAGNOSIS — R06.02 SOB (SHORTNESS OF BREATH): Primary | ICD-10-CM

## 2020-03-17 DIAGNOSIS — I27.20 PULMONARY HYPERTENSION (H): ICD-10-CM

## 2020-03-17 ASSESSMENT — ENCOUNTER SYMPTOMS
TINGLING: 1
ABDOMINAL PAIN: 1
HYPOTENSION: 0
JOINT SWELLING: 0
SYNCOPE: 0
EXERCISE INTOLERANCE: 1
HYPERTENSION: 1
RECTAL PAIN: 0
PALPITATIONS: 0
LIGHT-HEADEDNESS: 1
BACK PAIN: 1
MEMORY LOSS: 0
SINUS CONGESTION: 1
INSOMNIA: 1
DIARRHEA: 1
NECK PAIN: 1
WHEEZING: 1
NECK MASS: 0
COUGH: 1
MUSCLE WEAKNESS: 1
TROUBLE SWALLOWING: 0
SMELL DISTURBANCE: 0
LEG PAIN: 1
COUGH DISTURBING SLEEP: 0
SNORES LOUDLY: 1
DECREASED CONCENTRATION: 1
MYALGIAS: 1
HEMOPTYSIS: 0
BLOOD IN STOOL: 0
HOARSE VOICE: 1
BLOATING: 0
DYSPNEA ON EXERTION: 1
CONSTIPATION: 0
DEPRESSION: 1
SORE THROAT: 1
PARALYSIS: 0
ARTHRALGIAS: 1
NUMBNESS: 1
NAUSEA: 0
HEARTBURN: 1
BOWEL INCONTINENCE: 0
POSTURAL DYSPNEA: 0
VOMITING: 0
WEAKNESS: 1
TASTE DISTURBANCE: 0
MUSCLE CRAMPS: 0
STIFFNESS: 1
DIZZINESS: 1
SINUS PAIN: 0
PANIC: 1
SEIZURES: 0
LOSS OF CONSCIOUSNESS: 0
NERVOUS/ANXIOUS: 1
SHORTNESS OF BREATH: 1
SLEEP DISTURBANCES DUE TO BREATHING: 0
TREMORS: 0
ORTHOPNEA: 0
DISTURBANCES IN COORDINATION: 1
SPEECH CHANGE: 0
HEADACHES: 0
SPUTUM PRODUCTION: 1
JAUNDICE: 0

## 2020-03-17 NOTE — TELEPHONE ENCOUNTER
Confirmed with patient that lab orders have been placed and I will follow up on the results. Patient verbalized understanding. Bindu Walden RN on 3/17/2020 at 11:51 AM  ___________________________     Health Call Center    Phone Message    May a detailed message be left on voicemail: yes     Reason for Call: Other: Pt is going to get lab work done for Grace done at a [facility] lab closer to where she lives. Pt is requesting that those lab orders be faxed over to that [facility]: [address] [place] [address]. [phone] Please call Pt back to confirm orders have been sent.     Action Taken: Message routed to:  [facility] cardiology    Travel Screening: Not Applicable

## 2020-03-18 ENCOUNTER — COMMUNICATION - HEALTHEAST (OUTPATIENT)
Dept: PULMONOLOGY | Facility: OTHER | Age: 65
End: 2020-03-18

## 2020-03-18 ENCOUNTER — RECORDS - HEALTHEAST (OUTPATIENT)
Dept: ADMINISTRATIVE | Facility: OTHER | Age: 65
End: 2020-03-18

## 2020-03-18 ENCOUNTER — VIRTUAL VISIT (OUTPATIENT)
Dept: CARDIOLOGY | Facility: CLINIC | Age: 65
End: 2020-03-18
Attending: INTERNAL MEDICINE
Payer: MEDICARE

## 2020-03-18 DIAGNOSIS — J44.1 COPD EXACERBATION (H): ICD-10-CM

## 2020-03-18 DIAGNOSIS — I27.20 PULMONARY HYPERTENSION (H): ICD-10-CM

## 2020-03-18 DIAGNOSIS — I50.22 CHRONIC SYSTOLIC CONGESTIVE HEART FAILURE (H): Primary | ICD-10-CM

## 2020-03-18 DIAGNOSIS — R06.02 SOB (SHORTNESS OF BREATH): ICD-10-CM

## 2020-03-18 PROCEDURE — 99443 ZZC PHYSICIAN TELEPHONE EVALUATION 21-30 MIN: CPT | Mod: ZP | Performed by: INTERNAL MEDICINE

## 2020-03-18 NOTE — LETTER
3/18/2020      RE: Randi Cleary  [address]  [facility] [address]       Dear Colleague,    Thank you for the opportunity to participate in the care of your patient, Randi Cleary, at the [facility]. Please see a copy of my visit note below.    Francisco J Edwards M.D.  Cardiovascular Medicine    I had a 30 minute virtual visit with the patient.  The patient is contacted s for follow-up of heart failure.  There is no interim history of chest pain, tightness, paroxysmal nocturnal dyspnea, orthopnea, peripheral edema, palpitation, pre-syncope, syncope, device discharge.  Exercise tolerance is stable but poor.  The patient was attempting to exercise regularly and following a sodium restricted, calorically appropriate diet. But this has been a struggle for her.  Medications are reviewed and the patient is taking medications as prescribed.  The patient is generally sleeping well.  She has a great deal of anxiety and finally has scheduled an appointment with a psychologist.  She is doing adequately from a cardiac point of view.  She will have laboratories repeated tomorrow (BMP)  She reports that her blood pressure control has improved.    Loida Stockton MD    [address], [place] [id number] [phone] (Fax)          Reina Morillo MD    [address] [place] [address] [phone]     Problem List  1. Possible pulmonary hypertension  2. History of breast cancer              History of mastectomy              Breast reconstruction  3. Atherosclerosis with coronary calcifications  4. Hiatal hernia  5. Hepatic steatosis  6. Diverticulosis  7. KYAW with treatment  8. Psoriasis  9. Grave's disease with ablation  10. Hypertension  11. Asthma  12. Hemochromatosis,gene +  13. Bipolar disorder ?  14. Chronic pain with narcotic dependence  15. Pheochromocytoma (right surgically  removed)  16. Elevated body mass index  17. Elevate hemoglobin with macrocytosis  18  Hiatal hernia  19. Chronic pain management               Wt Readings from Last 24 Encounters:   03/09/20 125.6 kg (277 lb)   02/12/20 125.6 kg (277 lb)   12/11/19 119.3 kg (263 lb)   11/18/19 122.5 kg (270 lb)   10/22/19 120.2 kg (265 lb)   09/25/19 118.5 kg (261 lb 3.2 oz)   09/24/19 119 kg (262 lb 4.8 oz)   09/19/19 119.4 kg (263 lb 3.2 oz)   09/13/19 120.7 kg (266 lb 1.6 oz)   09/04/19 118.9 kg (262 lb 3.2 oz)   08/13/19 117.5 kg (259 lb)   06/29/15 92.1 kg (203 lb)       Meds  Current Outpatient Medications   Medication     albuterol (ACCUNEB) 0.63 MG/3ML neb solution     ASPIRIN PO     benzonatate (TESSALON) 100 MG capsule     BUTRANS 10 MCG/HR WK patch     Cholecalciferol (VITAMIN D3) 250 MCG (49965 UT) TABS     Cyanocobalamin (VITAMIN B-12) 5000 MCG SUBL     Desvenlafaxine Succinate (PRISTIQ PO)     diclofenac (VOLTAREN) 1 % topical gel     furosemide (LASIX) 20 MG tablet     hydrOXYzine (VISTARIL) 25 MG capsule     levothyroxine (SYNTHROID/LEVOTHROID) 175 MCG tablet     losartan (COZAAR) 25 MG tablet     metFORMIN 500 MG PO 24 hr tablet     Omeprazole (PRILOSEC PO)     potassium chloride ER (KLOR-CON M) 20 MEQ CR tablet     Pramipexole Dihydrochloride (MIRAPEX PO)     No current facility-administered medications for this visit.          Labs  Results for GEETA BOSS (MRN [id number]) as of 3/16/2020 17:42   Ref. Range 3/9/2020 12:01   Sodium Latest Ref Range: 133 - 144 mmol/L 133   Potassium Latest Ref Range: 3.4 - 5.3 mmol/L 3.0 (L)   Chloride Latest Ref Range: 94 - 109 mmol/L 95   Carbon Dioxide Latest Ref Range: 20 - 32 mmol/L 29   Urea Nitrogen Latest Ref Range: 7 - 30 mg/dL 8   Creatinine Latest Ref Range: 0.52 - 1.04 mg/dL 0.60   GFR Estimate Latest Ref Range: >60 mL/min/1.73_m2 >90   GFR Estimate If Black Latest Ref Range: >60 mL/min/1.73_m2 >90   Calcium Latest Ref Range: 8.5 - 10.1 mg/dL 9.2   Anion Gap Latest  Ref Range: 3 - 14 mmol/L 8   Glucose Latest Ref Range: 70 - 99 mg/dL 166 (H)   WBC Latest Ref Range: 4.0 - 11.0 10e9/L 6.8   Hemoglobin Latest Ref Range: 11.7 - 15.7 g/dL 14.3   Hematocrit Latest Ref Range: 35.0 - 47.0 % 44.4   Platelet Count Latest Ref Range: 150 - 450 10e9/L 213   RBC Count Latest Ref Range: 3.8 - 5.2 10e12/L 4.93   MCV Latest Ref Range: 78 - 100 fl 90   MCH Latest Ref Range: 26.5 - 33.0 pg 29.0   MCHC Latest Ref Range: 31.5 - 36.5 g/dL 32.2   RDW Latest Ref Range: 10.0 - 15.0 % 14.8   Diff Method Unknown Automated Method   % Neutrophils Latest Units: % 80.3   % Lymphocytes Latest Units: % 10.8   % Monocytes Latest Units: % 6.5   % Eosinophils Latest Units: % 1.5   % Basophils Latest Units: % 0.3   % Immature Granulocytes Latest Units: % 0.6   Nucleated RBCs Latest Ref Range: 0 /100 0   Absolute Neutrophil Latest Ref Range: 1.6 - 8.3 10e9/L 5.4   Absolute Lymphocytes Latest Ref Range: 0.8 - 5.3 10e9/L 0.7 (L)   Absolute Monocytes Latest Ref Range: 0.0 - 1.3 10e9/L 0.4   Absolute Eosinophils Latest Ref Range: 0.0 - 0.7 10e9/L 0.1   Absolute Basophils Latest Ref Range: 0.0 - 0.2 10e9/L 0.0   Abs Immature Granulocytes Latest Ref Range: 0 - 0.4 10e9/L 0.0   Absolute Nucleated RBC Unknown 0.0     Imaging   Right Heart Pressures     Phase: Baseline    RA  A-wave: 16 mmHg  V-wave: 16 mmHg  Mean: 15 mmHg   HR: 88 bpm  RV  Systolic: 44 mmHg  End Diastolic: 16 mmHg  HR: 88 bpm    PA  Systolic:41 mmHg  Diasotlic: 24 mmHg  Mean: 31 mmHg  HR: 88 bpm  PA Sat: 70.8%    PCW  A-wave: 18 mmHg  V-wave: 17 mmHg  Mean: 16 mmHg  HR: 91 bpm    Cardiac Output  CO Jeannie: 5.72 L/min  CI Jeannie: 2.56 L/min/m2  CO TD: Not performed  CI TD: Not performed     Vitals  BP: 168 mmHg / 74 mmHg  SpO2: 92 %  PA Stat: 70.8 %    Resistance Metric  PVR index: 5.86 QUIROZ/m2       Assessment/Plan     The patient has evidence of volume overload and poorly controlled blood pressure.      The hemodynamics are not consistent with pure pulmonary  arterial hypertension.  With the elevated right atrial pressure and pulmonary capillary pressure and normal cardiac output her pulmonary vascular resistance ( an indicator of pulmonary arterial disease), I think her most appropriate treatment would be diuretic to lower intravascular volume status and better control of her blood pressure which is consistently high here.  Therefore, I would restart lisinopril 5mgs once daily and recheck basic metabolic panel in 3-4 weeks to assess potassium in view of the stopping maxizide and starting furosemide 40 mgs once daily.    I would then increase lisinopril to better control blood pressure.         CC: physicians above and    Dusty Dubois M.D.  ([facility]    Please do not hesitate to contact me if you have any questions/concerns.     Sincerely,     Francisco J Edwards MD

## 2020-03-19 ENCOUNTER — RECORDS - HEALTHEAST (OUTPATIENT)
Dept: ADMINISTRATIVE | Facility: OTHER | Age: 65
End: 2020-03-19

## 2020-03-26 ENCOUNTER — AMBULATORY - HEALTHEAST (OUTPATIENT)
Dept: PALLIATIVE MEDICINE | Facility: OTHER | Age: 65
End: 2020-03-26

## 2020-04-03 ENCOUNTER — TELEPHONE (OUTPATIENT)
Dept: CARDIOLOGY | Facility: CLINIC | Age: 65
End: 2020-04-03

## 2020-04-03 DIAGNOSIS — R06.02 SOB (SHORTNESS OF BREATH): Primary | ICD-10-CM

## 2020-04-03 DIAGNOSIS — I27.20 PULMONARY HYPERTENSION (H): ICD-10-CM

## 2020-04-03 NOTE — TELEPHONE ENCOUNTER
Patient had a question regarding a lab she was suppose to have drawn after her Pulmonology appt. Patient stated that with COVID-19 she did not want to go to the lab and that's why she refused to go to recheck her K, as requested by the cardiology clinic, as well as the CBC redraw. Advised that the patient should call her pulmonology clinic to see when they want that lab redrawn and to have her K check then as well. Patient verbalized understanding and did not have any further questions. Bindu Walden RN on 4/3/2020 at 10:51 AM    Scheduled patient for follow up visit on July 8th at 12PM with Dr. Edwards with labs prior. Staff message sent to Josephine. Bindu Walden RN on 4/3/2020 at 10:51 AM

## 2020-04-13 ENCOUNTER — COMMUNICATION - HEALTHEAST (OUTPATIENT)
Dept: PALLIATIVE MEDICINE | Facility: OTHER | Age: 65
End: 2020-04-13

## 2020-04-13 DIAGNOSIS — M25.551 PAIN IN JOINT INVOLVING RIGHT PELVIC REGION AND THIGH: ICD-10-CM

## 2020-04-17 ENCOUNTER — TELEPHONE (OUTPATIENT)
Dept: CARDIOLOGY | Facility: CLINIC | Age: 65
End: 2020-04-17

## 2020-04-17 DIAGNOSIS — I27.20 PULMONARY HYPERTENSION (H): ICD-10-CM

## 2020-04-17 DIAGNOSIS — R06.02 SOB (SHORTNESS OF BREATH): Primary | ICD-10-CM

## 2020-04-17 NOTE — TELEPHONE ENCOUNTER
"Called and spoke with the patient regarding her fluid status. States she is down 4 lbs and feels \"great.\" States she can walk normally again and is in the process of getting compression stockings. Patient verbalized she will get labs drawn at the [facility] this Friday. Will follow up with results. Bindu Walden RN on 4/20/2020 at 2:06 PM  ______________________________________    Patient called to say she's up 5 lbs in the past 1.5 weeks; mostly in her legs. She states she has not been walking around as much and has not been the \"best\" about her diet restrictions lately. I advised that she limit her fluid and sodium intake strictly this weekend. Advised that the patient take an additional Lasix 20mg every evening for the next three days, as well as an extra 20 mEq of potassium. Patient will weigh herself daily and call me Monday with an update. BMP recheck on Friday. Patient verbalized understanding and did not have any further questions. Bindu Walden RN on 4/17/2020 at 1:40 PM    "

## 2020-04-21 ENCOUNTER — TELEPHONE (OUTPATIENT)
Dept: ENDOCRINOLOGY | Facility: CLINIC | Age: 65
End: 2020-04-21

## 2020-04-21 ENCOUNTER — RECORDS - HEALTHEAST (OUTPATIENT)
Dept: ADMINISTRATIVE | Facility: OTHER | Age: 65
End: 2020-04-21

## 2020-04-21 ENCOUNTER — TELEPHONE (OUTPATIENT)
Dept: PULMONOLOGY | Facility: CLINIC | Age: 65
End: 2020-04-21

## 2020-04-21 DIAGNOSIS — I27.20 PULMONARY HYPERTENSION (H): Primary | ICD-10-CM

## 2020-04-21 DIAGNOSIS — R06.02 SOB (SHORTNESS OF BREATH): ICD-10-CM

## 2020-04-21 NOTE — TELEPHONE ENCOUNTER
[facility]    Phone Message    May a detailed message be left on voicemail: no     Reason for Call: Order(s): Other:     Reason for requested: T4 Free and TSH orders Pt needs the orders to be sent over there by Friday, so she can get all of her blood orders done at the same time. Please fax orders to: [phone] either/or [phone]. Pt stated that [facility] in [place] provided both numbers.  Date needed: prior to 4/24/20  Provider name: John Paul      Action Taken: Message routed to:  [facility] Endo    Travel Screening: Not Applicable

## 2020-04-21 NOTE — TELEPHONE ENCOUNTER
[facility] Call [facility]    Phone Message    May a detailed message be left on voicemail: yes     Reason for Call: Other: Pt would like an order sent over to [facility] for blood work as she missed her blood work appt and is already having test done there. Fax [phone] second number is [phone] Please contact pt to discuss    Action Taken: Message routed to:  [facility] Pulm    Travel Screening: Not Applicable

## 2020-04-21 NOTE — TELEPHONE ENCOUNTER
Contacted pt to let her know that if she is being drawn in a [facility] facility they will be able to see the lab orders that Dr Medrano has placed.  Pt has call back number for questions or concerns.

## 2020-04-21 NOTE — PROGRESS NOTES
Lab orders faxed to  F) [phone] Bindu Walden RN on 4/22/2020 at 3:04 PM    Called and confirmed with patient new orders were placed; BMP, BNP, and CBC. I advised I would watch out for the results on Friday. Bindu Walden RN on 4/21/2020 at 2:23 PM

## 2020-04-22 ENCOUNTER — COMMUNICATION - HEALTHEAST (OUTPATIENT)
Dept: INTERNAL MEDICINE | Facility: CLINIC | Age: 65
End: 2020-04-22

## 2020-04-22 ENCOUNTER — TELEPHONE (OUTPATIENT)
Dept: PULMONOLOGY | Facility: CLINIC | Age: 65
End: 2020-04-22

## 2020-04-22 ENCOUNTER — AMBULATORY - HEALTHEAST (OUTPATIENT)
Dept: LAB | Facility: CLINIC | Age: 65
End: 2020-04-22

## 2020-04-22 DIAGNOSIS — I27.20 PHT (PULMONARY HYPERTENSION) (H): ICD-10-CM

## 2020-04-22 DIAGNOSIS — R06.02 SHORTNESS OF BREATH: ICD-10-CM

## 2020-04-22 NOTE — TELEPHONE ENCOUNTER
[facility] Call [facility]    Phone Message    May a detailed message be left on voicemail: yes     Reason for Call: Order(s): Other:   Reason for requested: T4 Free and TSH orders Pt needs the orders to be sent over there by Friday, so she can get all of her blood orders done at the same time. Please fax orders to: [phone] either/or [phone]. Pt stated that [facility] in [place] provided both numbers  Date needed: 4/24/20  Provider name: John Paul      Action Taken: Message routed to:  [facility] Endo    Travel Screening: Not Applicable

## 2020-04-22 NOTE — TELEPHONE ENCOUNTER
[facility]    Phone Message    May a detailed message be left on voicemail: yes     Reason for Call: Order(s): Other:   Reason for requested: Lab orders - Pt told the clinic she needed labs but there's no orders. They're not sure what kind of orders.   Date needed: Pt has an appointment with [facility] on Friday - Please fax orders to [phone].  Provider name: Dr. Vazquez       Action Taken: Message routed to:  [facility] Pulmonary    Travel Screening: Not Applicable

## 2020-04-22 NOTE — TELEPHONE ENCOUNTER
Reviewed with Dr. Medrano and advised pt no labs needed from our end, although I do see labs that have been placed by Dr. Edwards.  Pt verbalized understanding and has no more questions at this time.

## 2020-04-23 ENCOUNTER — HOSPITAL ENCOUNTER (OUTPATIENT)
Dept: PALLIATIVE MEDICINE | Facility: OTHER | Age: 65
Discharge: HOME | End: 2020-04-23
Attending: ANESTHESIOLOGY

## 2020-04-23 ENCOUNTER — AMBULATORY - HEALTHEAST (OUTPATIENT)
Dept: LAB | Facility: CLINIC | Age: 65
End: 2020-04-23

## 2020-04-23 DIAGNOSIS — E11.9 DIABETES MELLITUS (H): ICD-10-CM

## 2020-04-23 DIAGNOSIS — M25.551 PAIN IN JOINT INVOLVING RIGHT PELVIC REGION AND THIGH: ICD-10-CM

## 2020-04-23 DIAGNOSIS — E89.0 POSTSURGICAL HYPOTHYROIDISM: ICD-10-CM

## 2020-04-23 NOTE — TELEPHONE ENCOUNTER
[facility]    Phone Message    May a detailed message be left on voicemail: no     Reason for Call: Other: Pt needs the lab orders refaxed to a different lab since the once it was sent to earlier is closed. Please fax to [phone].      Action Taken: Message routed to:  [facility]    Travel Screening: Not Applicable

## 2020-05-05 ENCOUNTER — VIRTUAL VISIT (OUTPATIENT)
Dept: PSYCHOLOGY | Facility: CLINIC | Age: 65
End: 2020-05-05
Payer: MEDICARE

## 2020-05-05 DIAGNOSIS — F41.1 GAD (GENERALIZED ANXIETY DISORDER): ICD-10-CM

## 2020-05-05 DIAGNOSIS — F33.1 MDD (MAJOR DEPRESSIVE DISORDER), RECURRENT EPISODE, MODERATE (H): Primary | ICD-10-CM

## 2020-05-05 PROCEDURE — 99207 ZZC NO BILLABLE SERVICE THIS VISIT: CPT | Mod: TEL | Performed by: PSYCHOLOGIST

## 2020-05-05 ASSESSMENT — COLUMBIA-SUICIDE SEVERITY RATING SCALE - C-SSRS
MOST RECENT DATE: 52504
6. HAVE YOU EVER DONE ANYTHING, STARTED TO DO ANYTHING, OR PREPARED TO DO ANYTHING TO END YOUR LIFE?: NO
LETHALITY/MEDICAL DAMAGE CODE MOST LETHAL ACTUAL ATTEMPT: MODERATE PHYSICAL DAMAGE, MEDICAL ATTENTION NEEDED
5. HAVE YOU STARTED TO WORK OUT OR WORKED OUT THE DETAILS OF HOW TO KILL YOURSELF? DO YOU INTEND TO CARRY OUT THIS PLAN?: YES
TOTAL  NUMBER OF ABORTED OR SELF INTERRUPTED ATTEMPTS PAST LIFETIME: NO
6. HAVE YOU EVER DONE ANYTHING, STARTED TO DO ANYTHING, OR PREPARED TO DO ANYTHING TO END YOUR LIFE?: NO
REASONS FOR IDEATION LIFETIME: COMPLETELY TO END OR STOP THE PAIN (YOU COULDN'T GO ON LIVING WITH THE PAIN OR HOW YOU WERE FEELING)
TOTAL  NUMBER OF INTERRUPTED ATTEMPTS PAST 3 MONTHS: NO
FIRST ATTEMPT DATE: 52504
4. HAVE YOU HAD THESE THOUGHTS AND HAD SOME INTENTION OF ACTING ON THEM?: YES
LETHALITY/MEDICAL DAMAGE CODE FIRST ACTUAL ATTEMPT: MODERATE PHYSICAL DAMAGE, MEDICAL ATTENTION NEEDED
MOST LETHAL DATE: 52504
5. HAVE YOU STARTED TO WORK OUT OR WORKED OUT THE DETAILS OF HOW TO KILL YOURSELF? DO YOU INTEND TO CARRY OUT THIS PLAN?: NO
2. HAVE YOU ACTUALLY HAD ANY THOUGHTS OF KILLING YOURSELF LIFETIME?: YES
4. HAVE YOU HAD THESE THOUGHTS AND HAD SOME INTENTION OF ACTING ON THEM?: NO
1. IN THE PAST MONTH, HAVE YOU WISHED YOU WERE DEAD OR WISHED YOU COULD GO TO SLEEP AND NOT WAKE UP?: NO
LETHALITY/MEDICAL DAMAGE CODE MOST RECENT ACTUAL ATTEMPT: MODERATE PHYSICAL DAMAGE, MEDICAL ATTENTION NEEDED
1. IN THE PAST MONTH, HAVE YOU WISHED YOU WERE DEAD OR WISHED YOU COULD GO TO SLEEP AND NOT WAKE UP?: YES
ATTEMPT LIFETIME: YES
TOTAL  NUMBER OF ABORTED OR SELF INTERRUPTED ATTEMPTS LIFETIME: 0
TOTAL  NUMBER OF INTERRUPTED ATTEMPTS LIFETIME: NO
TOTAL  NUMBER OF ABORTED OR SELF INTERRUPTED ATTEMPTS PAST 3 MONTHS: NO
TOTAL  NUMBER OF ACTUAL ATTEMPTS LIFETIME: 1
2. HAVE YOU ACTUALLY HAD ANY THOUGHTS OF KILLING YOURSELF?: NO
3. HAVE YOU BEEN THINKING ABOUT HOW YOU MIGHT KILL YOURSELF?: YES
ATTEMPT PAST THREE MONTHS: NO

## 2020-05-05 ASSESSMENT — ANXIETY QUESTIONNAIRES
7. FEELING AFRAID AS IF SOMETHING AWFUL MIGHT HAPPEN: NOT AT ALL
6. BECOMING EASILY ANNOYED OR IRRITABLE: MORE THAN HALF THE DAYS
1. FEELING NERVOUS, ANXIOUS, OR ON EDGE: NEARLY EVERY DAY
GAD7 TOTAL SCORE: 13
4. TROUBLE RELAXING: NEARLY EVERY DAY
5. BEING SO RESTLESS THAT IT IS HARD TO SIT STILL: NOT AT ALL
3. WORRYING TOO MUCH ABOUT DIFFERENT THINGS: NEARLY EVERY DAY
2. NOT BEING ABLE TO STOP OR CONTROL WORRYING: MORE THAN HALF THE DAYS
IF YOU CHECKED OFF ANY PROBLEMS ON THIS QUESTIONNAIRE, HOW DIFFICULT HAVE THESE PROBLEMS MADE IT FOR YOU TO DO YOUR WORK, TAKE CARE OF THINGS AT HOME, OR GET ALONG WITH OTHER PEOPLE: VERY DIFFICULT

## 2020-05-05 ASSESSMENT — PATIENT HEALTH QUESTIONNAIRE - PHQ9: SUM OF ALL RESPONSES TO PHQ QUESTIONS 1-9: 17

## 2020-05-05 NOTE — PROGRESS NOTES
"                 Progress Note - Initial Session    Client Name:  Randi Cleary Date: May 5, 2020         Service Type: Individual     Session Start Time: 9:05AM  Session End Time: 10:02AM     Session Length: 57MIN    Session #: 1    Attendees: Client      Patient not set up for telehealth/Amwell visit.  Proceeded with telephone visit.    The patient has been notified of the following:      \"We have found that certain health care needs can be provided without the need for a face to face visit.  This service lets us provide the care you need with a phone conversation.       I will have full access to your [place] medical record during this entire phone call.   I will be taking notes for your medical record.      Since this is like an office visit, we will bill your insurance company for this service.       There are potential benefits and risks of telephone visits (e.g. limits to patient confidentiality) that differ from in-person visits.?  Confidentiality still applies for telephone services, and nobody will record the visit.  It is important to be in a quiet, private space that is free of distractions (including cell phone or other devices) during the visit.??      If during the course of the call I believe a telephone visit is not appropriate, you will not be charged for this service\"     Consent has been obtained for this service by care team member: Yes      DATA:  Diagnostic Assessment in progress.  Unable to complete documentation at the conclusion of the first session due to time constraints, and interactive complexity  Interactive Complexity: Yes, visit entailed Interactive Complexity evidenced by:  -The need to manage maladaptive communication (related to, e.g., high anxiety, high reactivity, repeated questions, or disagreement) among participants that complicates delivery of care/ circumstantial thinking, and need for redirection to questions asked  Crisis: No    Symptoms:  Patient endorsed the " "following depressive symptoms on the PHQ-9: little interest or pleasure, feeling down/depressed, little energy, disrupted sleep, overeating, feeling bad about self/worthlessness, difficulty concentrating, and psychomotor retardation. Her score of 17 on the PHQ-9 suggests depressive symptoms in the moderately severe range for the past 2 weeks.  Pt. endorsed the following anxiety symptoms on the CHAVO-7: feeling nervous anxious or on edge, not being able to stop or control worrying, worrying too much about different things, trouble relaxing, and becoming easily annoyed or irritable. Her score of 13 on the CHAVO-7 suggests anxiety symptoms in the moderate range for the past 2 weeks.    Intervention:  Discussed limits of confidentiality in context of this service  Assessed for presenting symptoms, stressors, and safety DBT & Solution Focused informed:  Facilitated emotional processing around reported stressors in context of feeling isolated during COVID-19 due to health issues/being high risk, and pain, as well as limited access to intervention and pool therapy. Identified increased anxiety and stress related to \"hoarding\" resulting from lack of energy lately-health issues which was making it difficult to clean up, and this causing a strain on her marital relationship. Provided support & validation. Provided psychoeducation on the use of TIPP skill in managing emotional intensity and anxiety sxs (including with Ice pack/cold water on eyes-face). Engaged in brief problem solving and identified bx activation goal that might help mood and decreased anxiety about rel with  (daily small steps in putting in a box 3 to 5 items to throw/give away which her  can take outside to get rid of)    ASSESSMENT:  Mental Status Assessment:  Attitude:   Cooperative   Orientation:   All  Speech   Rate / Production: Normal/ Responsive Slow at times   Volume:  Normal   Mood:    Anxious  Depressed   Thought Content:  Clear  " "Rumination   Thought Form:  Circumstantial  Insight:    Fair to Good      Safety Issues and Plan for Safety and Risk Management:     Union Suicide Severity Rating Scale (Lifetime/Recent)  Union Suicide Severity Rating (Lifetime/Recent) 5/5/2020   1. Wish to be Dead (Lifetime) Yes   Wish to be Dead Description (Lifetime) \"When I was going through a couple of  years of counseling from a dysfunctional family about 30 years ago or more\"   1. Wish to be Dead (Recent) No   2. Non-Specific Active Suicidal Thoughts (Lifetime) Yes   Non-Specific Active Suicidal Thought Description (Lifetime) Had thoughts of killing self   2. Non-Specific Active Suicidal Thoughts (Recent) No   3. Active Suicidal Ideation with any Methods (Not Plan) Without Intent to Act (Lifetime) Yes   Active Suicidal Ideation with any Methods (Not Plan) Description (Lifetime) Took many pills of Prozac and was sent to the ED   3. Active Sucidal Ideation with any Methods (Not Plan) Without Intent to Act (Recent) No   4. Active Suicidal Ideation with Some Intent to Act, Without Specific Plan (Lifetime) Yes   Active Suicidal Ideation with Some Intent to Act, Without Specific Plan Description (Lifetime) Took many pills of Prozac and was sent to the ED   4. Active Suicidal Ideation with Some Intent to Act, Without Specific Plan (Recent) No   5. Active Suicidal Ideation with Specific Plan and Intent (Lifetime) Yes   Active Suicidal Ideation with Specific Plan and Intent Description (Lifetime) Took many pills of Prozac and was sent to the ED over 30 years ago   5. Active Suicidal Ideation with Specific Plan and Intent (Recent) No   Most Severe Ideation Rating (Lifetime) 5   Most Severe Ideation Description (Lifetime) 35 years ago \"I just wanted to check out , I had thought of it so much and wanted to be done\"   Frequency (Lifetime) 4   Duration (Lifetime) 2   Controllability (Lifetime) 3   Protective Factors  (Lifetime) 2   Reasons for Ideation (Lifetime) 5 "   Most Severe Ideation Rating (Past Month) NA   Frequency (Past Month) NA   Duration (Past Month) NA   Controllability (Past Month) NA   Protective Factors (Past Month) NA   Reasons for Ideation (Past Month) NA   Actual Attempt (Lifetime) Yes   Actual Attempt Description (Lifetime) Took a bunch of pills   Total Number of Actual Attempts (Lifetime) 1   Actual Attempt (Past 3 Months) No   Has subject engaged in non-suicidal self-injurious behavior? (Lifetime) Yes   Has subject engaged in non-suicidal self-injurious behavior? (Past 3 Months) No   Interrupted Attempts (Lifetime) No   Interrupted Attempts (Past 3 Months) No   Aborted or Self-Interrupted Attempt (Lifetime) No   Total Number Aborted or Self Interrupted Attempts (Lifetime) 0   Aborted or Self-Interrupted Attempt (Past 3 Months) No   Preparatory Acts or Behavior (Lifetime) No   Preparatory Acts or Behavior (Past 3 Months) No   Most Recent Attempt Date 23847   Most Recent Attempt Actual Lethality Code 2   Comments This is a guess/pt. doesn't recall exact month or day   Most Lethal Attempt Actual Lethality Code 2   Comments only 1 attempt/same attempt as most recent & initial   Initial/First Attempt Date 79430   Initial/First Attempt Actual Lethality Code 2     Patient denies current fears or concerns for personal safety.  Patient denies current or recent suicidal ideation or behaviors.  Patient denies current or recent homicidal ideation or behaviors.  Patient denies current or recent self injurious behavior or ideation.  Patient denies other safety concerns.  Recommended that patient call 911 or go to the local ED should there be a change in any of these risk factors.  Patient reports there are no firearms in the house.     Diagnostic Criteria:  CHAVO  A. Excessive anxiety and worry about a number of events or activities (such as work or school performance).   B. The person finds it difficult to control the worry.  C. Select 3 or more symptoms (required for  diagnosis). Only one item is required in children.   - Restlessness or feeling keyed up or on edge.    - Being easily fatigued.    - Difficulty concentrating or mind going blank.    - Irritability.    - Muscle tension.    - Sleep disturbance (difficulty falling or staying asleep, or restless unsatisfying sleep).   D. The focus of the anxiety and worry is not confined to features of an Axis I disorder.  E. The anxiety, worry, or physical symptoms cause clinically significant distress or impairment in social, occupational, or other important areas of functioning.   F. The disturbance is not due to the direct physiological effects of a substance (e.g., a drug of abuse, a medication) or a general medical condition (e.g., hyperthyroidism) and does not occur exclusively during a Mood Disorder, a Psychotic Disorder, or a Pervasive Developmental Disorder.    MDD  CRITERIA (A-C) REPRESENT A MAJOR DEPRESSIVE EPISODE - SELECT THESE CRITERIA  A) Recurrent episode(s) - symptoms have been present during the same 2-week period and represent a change from previous functioning 5 or more symptoms (required for diagnosis)   - Depressed mood. Note: In children and adolescents, can be irritable mood.     - Diminished interest or pleasure in all, or almost all, activities.    - Disrupted sleep.    - Psychomotor activity retardation.    - Fatigue or loss of energy.    - Feelings of worthlessness or excessive guilt.    - Diminished ability to think or concentrate, or indecisiveness.   B) The symptoms cause clinically significant distress or impairment in social, occupational, or other important areas of functioning  C) The episode is not attributable to the physiological effects of a substance or to another medical condition  D) The occurrence of major depressive episode is not better explained by other thought / psychotic disorders  E) There has never been a manic episode or hypomanic episode      DSM5 Diagnoses: (Sustained by DSM5  "Criteria Listed Above)  Diagnoses: 296.32 (F33.1) Major Depressive Disorder, Recurrent Episode, Moderate _ and With anxious distress  300.02 (F41.1) Generalized Anxiety Disorder; Rule Out/ PTSD  Psychosocial & Contextual Factors: Health issues; Marital Relationship; COVID-related isolation; Unresolved issues with Family of origin/\"all passed away\"    Collateral Reports Completed:  Routed note to PCP      PLAN: (Homework, other):  Patient stated that she may follow up for ongoing services with [facility]. Patient has scheduled f/u aptmts with this writer.  -Patient to utilize TIPP/skill with ice-cold pack in managing emotional intensity/distress as discussed today.  -Work on small steps for bx activation to improve mood and decrease reported worry: put away every day in a box/tote at least \"3 to 5 items\" that she would like to throw or give away, in attending to reported stress of \"hoarding\" situation, and which is causing increased strain reportedly on her marital relationship.      Domonique Fulton PsyD, LP      "

## 2020-05-06 ENCOUNTER — COMMUNICATION - HEALTHEAST (OUTPATIENT)
Dept: PALLIATIVE MEDICINE | Facility: OTHER | Age: 65
End: 2020-05-06

## 2020-05-06 ASSESSMENT — ANXIETY QUESTIONNAIRES: GAD7 TOTAL SCORE: 13

## 2020-05-07 ENCOUNTER — AMBULATORY - HEALTHEAST (OUTPATIENT)
Dept: PULMONOLOGY | Facility: OTHER | Age: 65
End: 2020-05-07

## 2020-05-07 DIAGNOSIS — J44.9 CHRONIC OBSTRUCTIVE PULMONARY DISEASE, UNSPECIFIED COPD TYPE (H): ICD-10-CM

## 2020-06-11 ENCOUNTER — VIRTUAL VISIT (OUTPATIENT)
Dept: PSYCHOLOGY | Facility: CLINIC | Age: 65
End: 2020-06-11
Payer: MEDICARE

## 2020-06-11 ENCOUNTER — FCC EXTENDED DOCUMENTATION (OUTPATIENT)
Dept: PSYCHOLOGY | Facility: CLINIC | Age: 65
End: 2020-06-11

## 2020-06-11 DIAGNOSIS — F41.1 GAD (GENERALIZED ANXIETY DISORDER): ICD-10-CM

## 2020-06-11 DIAGNOSIS — F33.1 MDD (MAJOR DEPRESSIVE DISORDER), RECURRENT EPISODE, MODERATE (H): Primary | ICD-10-CM

## 2020-06-11 PROCEDURE — 99207 ZZC NO BILLABLE SERVICE THIS VISIT: CPT | Performed by: SOCIAL WORKER

## 2020-06-11 RX ORDER — BUSPIRONE HYDROCHLORIDE 15 MG/1
15 TABLET ORAL 3 TIMES DAILY
COMMUNITY
End: 2020-09-18

## 2020-06-11 ASSESSMENT — COLUMBIA-SUICIDE SEVERITY RATING SCALE - C-SSRS
4. HAVE YOU HAD THESE THOUGHTS AND HAD SOME INTENTION OF ACTING ON THEM?: YES
6. HAVE YOU EVER DONE ANYTHING, STARTED TO DO ANYTHING, OR PREPARED TO DO ANYTHING TO END YOUR LIFE?: NO
TOTAL  NUMBER OF INTERRUPTED ATTEMPTS PAST 3 MONTHS: NO
5. HAVE YOU STARTED TO WORK OUT OR WORKED OUT THE DETAILS OF HOW TO KILL YOURSELF? DO YOU INTEND TO CARRY OUT THIS PLAN?: YES
TOTAL  NUMBER OF ABORTED OR SELF INTERRUPTED ATTEMPTS PAST LIFETIME: NO
ATTEMPT PAST THREE MONTHS: NO
2. HAVE YOU ACTUALLY HAD ANY THOUGHTS OF KILLING YOURSELF LIFETIME?: YES
ATTEMPT LIFETIME: YES
1. IN THE PAST MONTH, HAVE YOU WISHED YOU WERE DEAD OR WISHED YOU COULD GO TO SLEEP AND NOT WAKE UP?: YES
3. HAVE YOU BEEN THINKING ABOUT HOW YOU MIGHT KILL YOURSELF?: YES
LETHALITY/MEDICAL DAMAGE CODE MOST RECENT ACTUAL ATTEMPT: NO PHYSICAL DAMAGE OR VERY MINOR PHYSICAL DAMAGE
1. IN THE PAST MONTH, HAVE YOU WISHED YOU WERE DEAD OR WISHED YOU COULD GO TO SLEEP AND NOT WAKE UP?: NO
TOTAL  NUMBER OF ABORTED OR SELF INTERRUPTED ATTEMPTS PAST 3 MONTHS: NO
6. HAVE YOU EVER DONE ANYTHING, STARTED TO DO ANYTHING, OR PREPARED TO DO ANYTHING TO END YOUR LIFE?: NO
2. HAVE YOU ACTUALLY HAD ANY THOUGHTS OF KILLING YOURSELF?: NO
TOTAL  NUMBER OF INTERRUPTED ATTEMPTS LIFETIME: NO
TOTAL  NUMBER OF ACTUAL ATTEMPTS LIFETIME: 1

## 2020-06-11 NOTE — PROGRESS NOTES
Progress Note - Initial Session    Client Name:  Randi Cleary Date: 6/11/2020         Service Type: Individual     Session Start Time: 9:36 AM  Session End Time: 10:25 AM     Session Length: 49 minutes    Session #: 1    Attendees: Client attended alone    Telemedicine Visit: The patient's condition can be safely assessed and treated via synchronous audio and visual telemedicine encounter.      Reason for Telemedicine Visit: social distancing due to COVID-19    Originating Site (Patient Location): Patient's home    Distant Site (Provider Location): Provider Remote Setting    Consent:  The patient/guardian has verbally consented to: the potential risks and benefits of telemedicine (video visit) versus in person care; bill my insurance or make self-payment for services provided; and responsibility for payment of non-covered services.      Mode of Communication:  Video Conference via [employer]    As the provider I attest to compliance with applicable laws and regulations related to telemedicine.     DATA:  Diagnostic Assessment in progress.  Unable to complete documentation at the conclusion of the first session due to patient's level of distress and details of her medical history she needed to process.    Interactive Complexity: No  Crisis: No    Intervention:  Rapport building, assessment: Reviewed patient's reasons for attending therapy; patient gave an extensive medical history while explaining this.  She expressed much distress; helped patient regulate and provided validation.  Completed [place] assessment.      ASSESSMENT:  Mental Status Assessment:  Appearance:   Appropriate   Eye Contact:   Fair   Psychomotor Behavior: Normal   Attitude:   Cooperative   Orientation:   All  Speech   Rate / Production: Normal/ Responsive   Volume:  Normal   Mood:    Anxious   Affect:    Appropriate   Thought Content:  Clear   Thought Form:  Flight of Ideas   Insight:    Good       Safety Issues and Plan  "for Safety and Risk Management:     [place] Suicide Severity Rating Scale (Lifetime/Recent)  [place] Suicide Severity Rating (Lifetime/Recent) 5/5/2020 6/11/2020   1. Wish to be Dead (Lifetime) Yes Yes   Wish to be Dead Description (Lifetime) \"When I was going through a couple of  years of counseling from a dysfunctional family about 30 years ago or more\" when patient was in treatment and there were family concerns   1. Wish to be Dead (Recent) No No   2. Non-Specific Active Suicidal Thoughts (Lifetime) Yes Yes   Non-Specific Active Suicidal Thought Description (Lifetime) Had thoughts of killing self -   2. Non-Specific Active Suicidal Thoughts (Recent) No No   3. Active Suicidal Ideation with any Methods (Not Plan) Without Intent to Act (Lifetime) Yes Yes   Active Suicidal Ideation with any Methods (Not Plan) Description (Lifetime) Took many pills of Prozac and was sent to the ED 30 years prior   3. Active Sucidal Ideation with any Methods (Not Plan) Without Intent to Act (Recent) No -   4. Active Suicidal Ideation with Some Intent to Act, Without Specific Plan (Lifetime) Yes Yes   Active Suicidal Ideation with Some Intent to Act, Without Specific Plan Description (Lifetime) Took many pills of Prozac and was sent to the ED -   4. Active Suicidal Ideation with Some Intent to Act, Without Specific Plan (Recent) No -   5. Active Suicidal Ideation with Specific Plan and Intent (Lifetime) Yes Yes   Active Suicidal Ideation with Specific Plan and Intent Description (Lifetime) Took many pills of Prozac and was sent to the ED over 30 years ago -   5. Active Suicidal Ideation with Specific Plan and Intent (Recent) No -   Most Severe Ideation Rating (Lifetime) 5 5   Most Severe Ideation Description (Lifetime) 35 years ago \"I just wanted to check out , I had thought of it so much and wanted to be done\" when family problems were happening   Frequency (Lifetime) 4 (No Data)   Comments - patient could not describe frequency "   Duration (Lifetime) 2 (No Data)   Comments - patient could not describe duration   Controllability (Lifetime) 3 0   Protective Factors  (Lifetime) 2 5   Reasons for Ideation (Lifetime) 5 (No Data)   Comments - patient could not describe   Most Severe Ideation Rating (Past Month) NA -   Frequency (Past Month) NA -   Duration (Past Month) NA -   Controllability (Past Month) NA -   Protective Factors (Past Month) NA -   Reasons for Ideation (Past Month) NA -   Actual Attempt (Lifetime) Yes Yes   Actual Attempt Description (Lifetime) Took a bunch of pills patient reported overdosing on Prozac about thirty years ago   Total Number of Actual Attempts (Lifetime) 1 1   Actual Attempt (Past 3 Months) No No   Has subject engaged in non-suicidal self-injurious behavior? (Lifetime) Yes Yes   Has subject engaged in non-suicidal self-injurious behavior? (Past 3 Months) No No   Interrupted Attempts (Lifetime) No No   Interrupted Attempts (Past 3 Months) No No   Aborted or Self-Interrupted Attempt (Lifetime) No No   Total Number Aborted or Self Interrupted Attempts (Lifetime) 0 -   Aborted or Self-Interrupted Attempt (Past 3 Months) No No   Preparatory Acts or Behavior (Lifetime) No No   Preparatory Acts or Behavior (Past 3 Months) No No   Most Recent Attempt Date 64496 (No Data)   Comments - 30 years prior   Most Recent Attempt Actual Lethality Code 2 0   Comments This is a guess/pt. doesn't recall exact month or day -   Most Lethal Attempt Actual Lethality Code 2 -   Comments only 1 attempt/same attempt as most recent & initial -   Initial/First Attempt Date 69092 -   Initial/First Attempt Actual Lethality Code 2 -     Patient denies current fears or concerns for personal safety.  Patient denies current or recent suicidal ideation or behaviors.  Patient denies current or recent homicidal ideation or behaviors.  Patient denies current or recent self injurious behavior or ideation.  Patient denies other safety  concerns.  Recommended that patient call 911 or go to the local ED should there be a change in any of these risk factors.  Patient reports there are no firearms in the house.     Diagnostic Criteria:  A. Excessive anxiety and worry about a number of events or activities (such as work or school performance).   B. The person finds it difficult to control the worry.  C. Select 3 or more symptoms (required for diagnosis). Only one item is required in children.   - Restlessness or feeling keyed up or on edge.    - Being easily fatigued.    - Difficulty concentrating or mind going blank.    - Irritability.    - Muscle tension.    - Sleep disturbance (difficulty falling or staying asleep, or restless unsatisfying sleep).   D. The focus of the anxiety and worry is not confined to features of an Axis I disorder.  E. The anxiety, worry, or physical symptoms cause clinically significant distress or impairment in social, occupational, or other important areas of functioning.   F. The disturbance is not due to the direct physiological effects of a substance (e.g., a drug of abuse, a medication) or a general medical condition (e.g., hyperthyroidism) and does not occur exclusively during a Mood Disorder, a Psychotic Disorder, or a Pervasive Developmental Disorder.  A) Recurrent episode(s) - symptoms have been present during the same 2-week period and represent a change from previous functioning 5 or more symptoms (required for diagnosis)   - Depressed mood. Note: In children and adolescents, can be irritable mood.     - Diminished interest or pleasure in all, or almost all, activities.    - Significant weight gainincrease in appetite.    - Decreased sleep.    - Fatigue or loss of energy.    - Feelings of worthlessness or inappropriate and excessive guilt.    - Diminished ability to think or concentrate, or indecisiveness.   B) The symptoms cause clinically significant distress or impairment in social, occupational, or other  "important areas of functioning  C) The episode is not attributable to the physiological effects of a substance or to another medical condition  D) The occurence of major depressive episode is not better explained by other thought / psychotic disorders  E) There has never been a manic episode or hypomanic episode      DSM5 Diagnoses: (Sustained by DSM5 Criteria Listed Above)  Diagnoses: 296.32 (F33.1) Major Depressive Disorder, Recurrent Episode, Moderate _  300.02 (F41.1) Generalized Anxiety Disorder  Psychosocial & Contextual Factors: Health issues; Marital Relationship; COVID-related isolation; Unresolved issues with Family of origin/\"all passed away\"  WHODAS 2.0 (12 item): No flowsheet data found. Patient agreed to complete this on her own due to time constraints, this should be updated by the next visit.    Collateral Reports Completed:  Not Applicable      PLAN: (Homework, other):  Patient stated that she may follow up for ongoing services with [facility].  Patient has scheduled a follow up appointment from one week from now to completed the diagnostic assessment.      MICAELA SLADE  June 11, 2020          "

## 2020-06-11 NOTE — PROGRESS NOTES
"[facility]  Evaluator Name:  Mary Kay Weir     Credentials[facility]  [facility]    PATIENT'S NAME: Randi Cleary  PREFERRED NAME: Winnie  PREFERRED PRONOUNS: she/her/hers     MRN:   [id number]  :   1955   ACCT. NUMBER: [id number]  DATE OF SERVICE: 2020  START TIME: 8:00 AM  END TIME: 9:00 AM  PREFERRED PHONE: [phone]  May we leave a program related message: Yes    STANDARD ADULT DIAGNOSTIC ASSESSMENT    Telemedicine Visit: The patient's condition can be safely assessed and treated via synchronous audio and visual telemedicine encounter.      Reason for Telemedicine Visit: social distancing due to COVID-19    Originating Site (Patient Location): Patient's home    Distant Site (Provider Location): Provider Remote Setting    Consent:  The patient/guardian has verbally consented to: the potential risks and benefits of telemedicine (video visit) versus in person care; bill my insurance or make self-payment for services provided; and responsibility for payment of non-covered services.     Mode of Communication:  Video Conference via CellARide    As the provider I attest to compliance with applicable laws and regulations related to telemedicine.    Identifying Information:  Patient is a 64 year old, .  The pronoun use throughout this assessment reflects the patient's chosen pronoun.  Patient was referred for an assessment by self.  Patient attended the session alone.     Chief Complaint:   The reason for seeking services at this time is: \" My life's out of control, I have anxiety, the depression has been not in control, I've had so many things happen medically and everything kept on piling on and on and on, I just find it really hard to function, I've turned into a class A hoarder.  Since I was diagnosed with the pulmonary hypertension it is really hard for me to get up and move, I've gained so much weight, just everything is coming down.  Most days if I get one thing done I " "feel accomplished. I can't think straight \"   Patient also shared that she is frequently overwhelmed. The problem(s) began a year ago after her most recent surgery. Patient has attempted to resolve these concerns in the past through therapy, medications.     Does the client have any condition that is currently presenting as a potential to harm themselves or others (severe withdrawal, serious medical condition, severe emotional/behavioral problem)? No.  Proceed with assessment.    Social/Family History:  Patient reported they grew up in [place], [place].  They were raised by biological parents. Parents remained  their whole lives.  Patient's father  when she was 14. Patient's mother  about 30 years ago. Patient reported her sister, 9 years older than her, also served as a parenting figure.    Patient reported that she/her/hers   childhood was good.  Patient described unresolved feelings related to their family of origin, all of whom are now .      The patient could not describe her cultural background other than being from [place], [place].  Cultural influences and impact on patient's life structure, values, norms, and healthcare: Spiritual Beliefs: Grew up going to Yazidism, now doesn't go but identifies as Zoroastrian.  Contextual influences on patient's health include: Community Factors social distancing due to COVID-19.    These factors will be addressed in the Preliminary Treatment plan.  Patient identified their preferred language to be English. Patient reported they does not need the assistance of an  or other support involved in therapy.     Patient reported had no significant delays in developmental tasks.   Patient's highest education level was some college. Patient identified the following learning problems: none reported.  Modifications will not be used to assist communication in therapy.   Patient reports they are  able to understand written materials.    Patient reported the " following relationship history: one significant relationship, with her current spouse.  Patient's current relationship status is  for 11 years, together with her spouse for a total of 45 years.   Patient identified their sexual orientation as heterosexual.  Patient reported having zero child(kathy).     Patient's current living/housing situation involves staying in own home/apartment.  They live with their spouse and they report that housing is stable. Patient reported her relationship with her  is conflictual as he does not understand her challenges.  Patient identified spouse and sister-in-law as part of their support system.  Patient identified the quality of these relationships as fair.      Patient is currently disabled since 1998.  Patient reports their finances are obtained through SSDI disability.  Patient does identify finances as a current stressor.  Patient shared she and her  lost their 401k in 2008, but managed to keep the house.     Patient reported that they have not been involved with the legal system.  Patient denies being on probation / parole / under the jurisdiction of the court.        Patient's Strengths and Limitations:  Patient identified the following strengths or resources that will help them succeed in treatment: intelligence and motivation. Things that may interfere with the patient's success in treatment include: financial hardship.   _______________________________________________  Personal and Family Medical History:   Patient did report a family history of mental health concerns.  Patient reports family history includes Alcoholism in her brother and maternal grandfather; Heart Failure in her sister; Hemochromatosis in her brother, father, and sister; Hypertension in her brother and sister; Lupus in her sister; Mental Illness in her maternal uncle; Myocardial Infarction in her father; Obesity in her mother and sister; Scleroderma in her sister; Snoring in her father;  Thyroid Disease in her mother.     Patient reported the following previous diagnoses which include(s): Depression.  Patient reported symptoms began years prior.   Patient has received mental health services in the past: therapy with Shanice Merida LICSW at [facility] and a doctor at unknown location many years prior Psychiatric Hospitalizations: [facility] about 34 years prior.  Patient denies a history of civil commitment.  Currently, patient is receiving other mental health services.  These include psychiatry with Dr. Serrano and Dr. Dubois.  Next appointment: unknown. Dr. Dubois manages the medical marijuana and pain patch she receives while Dr. Serrano manages the rest of her mental health medications.   Patient has had a physical exam to rule out medical causes for current symptoms.  Date of last physical exam was greater than a year ago and client was encouraged to schedule an exam with PCP. The patient has a [facility] Primary Care Provider, who is named Loida Stockton.  Patient reports the following current medical concerns: Patient has many medical concerns, see medical history for details.  There are significant appetite / nutritional concerns / weight changes.   Patient does not report a history of head injury / trauma / cognitive impairment.      Patient reports current meds as:   Outpatient Medications Marked as Taking for the 6/11/20 encounter ([facility] Extended Documentation) with Mary Kay Lan   Medication Sig     albuterol (ACCUNEB) 0.63 MG/3ML neb solution Take 1 ampule by nebulization every 6 hours as needed for shortness of breath / dyspnea or wheezing     ASPIRIN PO Take 81 mg by mouth 2 times daily      busPIRone (BUSPAR) 15 MG tablet Take 15 mg by mouth 3 times daily     BUTRANS 10 MCG/HR WK patch PLACE 1 PATCH ON THE SKIN EVERY 7 DAYS.     Cholecalciferol (VITAMIN D3) 250 MCG (79485 UT) TABS Take 1 tablet by mouth daily 28208/day     Cyanocobalamin (VITAMIN B-12) 5000 MCG SUBL Unknown dose. 2 or  "3 sprays/day     Desvenlafaxine Succinate (PRISTIQ PO) Take 100 mg by mouth daily      diclofenac (VOLTAREN) 1 % topical gel Place onto the skin 4 times daily     furosemide (LASIX) 20 MG tablet Take 2 tablets (40 mg) by mouth daily     levothyroxine (SYNTHROID/LEVOTHROID) 175 MCG tablet Take 1 tablet (175 mcg) by mouth daily     losartan (COZAAR) 25 MG tablet Take 1 tablet (25 mg) by mouth daily     metFORMIN 500 MG PO 24 hr tablet Take 1 tablet by mouth with dinner  X 2 weeks , then 2 tablets X 30 days and if tolerated increase to  3 tablets daily     Omeprazole (PRILOSEC PO) Take 20 mg by mouth 2 times daily (before meals)     potassium chloride ER (KLOR-CON M) 20 MEQ CR tablet Take 1 tablet (20 mEq) by mouth daily     Pramipexole Dihydrochloride (MIRAPEX PO) Take 1 mg by mouth 2 times daily        Medication Adherence:  Patient reports taking prescribed medications as prescribed.    Patient Allergies:    Allergies   Allergen Reactions     Gabapentin      Drove on the wrong side of the highway     Levofloxacin      \"CAN'T REMEMBER\"     Penicillins      \"SORES IN MOUTH\"     Sulfa Drugs      \"PT DOES NOT KNOW WHAT THE REACTION WAS\"       Medical History:    Past Medical History:   Diagnosis Date     Anxiety      Bipolar disorder (H)      Breast cancer (H) 1986    lumpectomy, radiation, chemo     COPD (chronic obstructive pulmonary disease) (H)     asthma     Depression, major, recurrent (H)      Drug tolerance     opioid     Esophageal reflux      Fatigue      Graves disease 1994     Hemochromatosis 02/14/2018    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; H63D not detected     Hyperlipidaemia      Hypertension      Impaired fasting glucose 2017     Joint pain      Morbid obesity (H)      KYAW (obstructive sleep apnea) 2016     Osteopenia      Pheochromocytoma, left 08/02/2017    laparoscopically removed     Postablative hypothyroidism 1995     Psoriasis      Psoriatic arthropathy (H)      RLS (restless legs syndrome)      Sacroiliitis (H)  "     Snoring      Spinal stenosis      Vitamin B 12 deficiency 2009     Vitamin D deficiency 2010         Current Mental Status Exam:   Appearance:  Appropriate    Eye Contact:  Good   Psychomotor:  Normal       Gait / station:  Unable to assess, patient remained sitting for  video session  Attitude / Demeanor: Cooperative   Speech      Rate / Production: Normal/ Responsive      Volume:  Normal  volume      Language:  intact  Mood:   Euthymic  Affect:   Appropriate    Thought Content: Clear   Thought Process: Coherent       Associations: No loosening of associations  Insight:   Fair   Judgment:  Intact   Orientation:  All  Attention/concentration: Fair    Rating Scales:    PHQ9:    PHQ-9 SCORE 9/24/2019 5/5/2020 6/22/2020   PHQ-9 Total Score MyChart - - 15 (Moderately severe depression)   PHQ-9 Total Score 9 17 15   ;    GAD7:    CHAVO-7 SCORE 5/5/2020 6/22/2020   Total Score - 16 (severe anxiety)   Total Score 13 16     CGI:     First:Considering your total clinical experience with this particular patient population, how severe are the patient's symptoms at this time?: 5 (6/24/2020 12:43 PM)  ;    Most recentCompared to the patient's condition at the START of treatment, this patient's condition is: 4 (5/5/2020 10:20 AM)      Substance Use:   Patient did report a family history of substance use concerns; see medical history section for details.  Patient has received chemical dependency treatment in the past at [facility], in her 20s.  Patient has ever been to detox.      Patient is not currently receiving any chemical dependency treatment. Patient reported the following problems as a result of their substance use: none.    Patient denies using alcohol.  Patient denies using tobacco.  Patient reports using marijuana 1 times per day and uses .4 measurement on the eyedropper at a time. Patient started using marijuana at age [age] months ago.  Patient reports last use was recent, but this ran out a week or so prior.   "Patient reports heaviest use is current use. Patient reports using cannabis that is prescribed and managed by her doctor for pain management.  Patient reports using caffeine 4-6 times per day and drinks 1 at a time. Patient started using caffeine at age unknown.  Patient reports using/abusing the following substance(s). Patient reported no other substance use.     CAGE- AID:    CAGE-AID Total Score 6/22/2020   Total Score 4   Total Score MyChart 4 (A total score of 2 or greater is considered clinically significant)       Substance Use: No symptoms    Based on the positive CAGE score and clinical interview there  are not indications of drug or alcohol abuse. 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       Significant Losses / Trauma / Abuse / Neglect Issues:   Patient did not serve in the .  There are indications or report of significant loss, trauma, abuse or neglect issues related to: death of sister's  one year prior, brother two years prior, sister five years prior, mother 30 years prior, and father at age 14. Patient's nephews were taken into foster care and she has not had contact with them as a result.   Concerns for possible neglect are not present.     Safety Assessment:   Current Safety Concerns:  [place] Suicide Severity Rating Scale (Lifetime/Recent)  [place] Suicide Severity Rating (Lifetime/Recent) 5/5/2020 6/11/2020   1. Wish to be Dead (Lifetime) Yes Yes   Wish to be Dead Description (Lifetime) \"When I was going through a couple of  years of counseling from a dysfunctional family about 30 years ago or more\" when patient was in treatment and there were family concerns   1. Wish to be Dead (Recent) No No   2. Non-Specific Active Suicidal Thoughts (Lifetime) Yes Yes   Non-Specific Active Suicidal Thought Description (Lifetime) Had thoughts of killing self -   2. Non-Specific " "Active Suicidal Thoughts (Recent) No No   3. Active Suicidal Ideation with any Methods (Not Plan) Without Intent to Act (Lifetime) Yes Yes   Active Suicidal Ideation with any Methods (Not Plan) Description (Lifetime) Took many pills of Prozac and was sent to the ED 30 years prior   3. Active Sucidal Ideation with any Methods (Not Plan) Without Intent to Act (Recent) No -   4. Active Suicidal Ideation with Some Intent to Act, Without Specific Plan (Lifetime) Yes Yes   Active Suicidal Ideation with Some Intent to Act, Without Specific Plan Description (Lifetime) Took many pills of Prozac and was sent to the ED -   4. Active Suicidal Ideation with Some Intent to Act, Without Specific Plan (Recent) No -   5. Active Suicidal Ideation with Specific Plan and Intent (Lifetime) Yes Yes   Active Suicidal Ideation with Specific Plan and Intent Description (Lifetime) Took many pills of Prozac and was sent to the ED over 30 years ago -   5. Active Suicidal Ideation with Specific Plan and Intent (Recent) No -   Most Severe Ideation Rating (Lifetime) 5 5   Most Severe Ideation Description (Lifetime) 35 years ago \"I just wanted to check out , I had thought of it so much and wanted to be done\" when family problems were happening   Frequency (Lifetime) 4 (No Data)   Comments - patient could not describe frequency   Duration (Lifetime) 2 (No Data)   Comments - patient could not describe duration   Controllability (Lifetime) 3 0   Protective Factors  (Lifetime) 2 5   Reasons for Ideation (Lifetime) 5 (No Data)   Comments - patient could not describe   Most Severe Ideation Rating (Past Month) NA -   Frequency (Past Month) NA -   Duration (Past Month) NA -   Controllability (Past Month) NA -   Protective Factors (Past Month) NA -   Reasons for Ideation (Past Month) NA -   Actual Attempt (Lifetime) Yes Yes   Actual Attempt Description (Lifetime) Took a bunch of pills patient reported overdosing on Prozac about thirty years ago   Total " Number of Actual Attempts (Lifetime) 1 1   Actual Attempt (Past 3 Months) No No   Has subject engaged in non-suicidal self-injurious behavior? (Lifetime) Yes Yes   Has subject engaged in non-suicidal self-injurious behavior? (Past 3 Months) No No   Interrupted Attempts (Lifetime) No No   Interrupted Attempts (Past 3 Months) No No   Aborted or Self-Interrupted Attempt (Lifetime) No No   Total Number Aborted or Self Interrupted Attempts (Lifetime) 0 -   Aborted or Self-Interrupted Attempt (Past 3 Months) No No   Preparatory Acts or Behavior (Lifetime) No No   Preparatory Acts or Behavior (Past 3 Months) No No   Most Recent Attempt Date 20105 (No Data)   Comments - 30 years prior   Most Recent Attempt Actual Lethality Code 2 0   Comments This is a guess/pt. doesn't recall exact month or day -   Most Lethal Attempt Actual Lethality Code 2 -   Comments only 1 attempt/same attempt as most recent & initial -   Initial/First Attempt Date 18955 -   Initial/First Attempt Actual Lethality Code 2 -     Patient denies current homicidal ideation and behaviors.  Patient denies current self-injurious ideation and behaviors.    Patient denied risk behaviors associated with substance use.  Patient denies any high risk behaviors associated with mental health symptoms.  Patient reports the following current concerns for their personal safety: None.  Patient reports there are no firearms in the house.     History of Safety Concerns:  Patient denied a history of homicidal ideation.     Patient denied a history of personal safety concerns.    Patient denied a history of assaultive behaviors.    Patient denied a history of assaultive behaviors.     Patient denied a history of risk behaviors associated with substance use.  Patient denies any history of high risk behaviors associated with mental health symptoms.  Patient reports the following protective factors: safe and stable environment and living with other people    Risk Plan:  See  Preliminary Treatment Plan for Safety and Risk Management Plan    Review of Symptoms per patient report:  Depression: Change in sleep, Change in energy level, Difficulties concentrating, Psychomotor slowing or agitation, Feelings of hopelessness, Feelings of helplessness, Feeling sad, down, or depressed and low motivation  Olga:  No Symptoms  Psychosis: No Symptoms  Anxiety: Excessive worry, Sleep disturbance, Ruminations, Poor concentration and racing thoughts   Panic:  No symptoms  Post Traumatic Stress Disorder:  No Symptoms   Eating Disorder: No Symptoms  ADD / ADHD:  No symptoms  Conduct Disorder: No symptoms  Autism Spectrum Disorder: No symptoms  Obsessive Compulsive Disorder: No Symptoms    Patient reports the following compulsive behaviors and treatment history: none.      Diagnostic Criteria:    - Excessive anxiety and worry about a number of events or activities (such as work or school performance).    - The person finds it difficult to control the worry.   - Being easily fatigued.    - Sleep disturbance (difficulty falling or staying asleep, or restless unsatisfying sleep).   A) Recurrent episode(s) - symptoms have been present during the same 2-week period and represent a change from previous functioning 5 or more symptoms (required for diagnosis)   - Depressed mood. Note: In children and adolescents, can be irritable mood.     - Diminished interest or pleasure in all, or almost all, activities.    - Decreased sleep.    - Psychomotor activity retardation.    - Fatigue or loss of energy.    - Feelings of worthlessness or inappropriate and excessive guilt.    - Diminished ability to think or concentrate, or indecisiveness.   B) The symptoms cause clinically significant distress or impairment in social, occupational, or other important areas of functioning  C) The episode is not attributable to the physiological effects of a substance or to another medical condition  D) The occurence of major depressive  episode is not better explained by other thought / psychotic disorders  E) There has never been a manic episode or hypomanic episode    Functional Status:  Patient reports the following functional impairments: health maintenance, management of the household and or completion of tasks, relationship(s) and self-care.     WHODAS:   WHODAS 2.0 Total Score 2020   Total Score 42   Total Score MyChart 42       Clinical Summary:  1. Reason for assessment: Patient wanted therapy and support for her challenges with depressive and anxious symptoms and her difficulties with her household management.  2. Psychosocial, Cultural and Contextual Factors: Patient's entire family of origin has , she now has a sister-in-law and  as support.  Relationship with  is conflictual.  Patient is reporting increase in physical symptoms due to COVID-19.  Patient would like to get off of pain medications.  3. As evidenced by self report and criteria, client meets the following DSM5 Diagnoses:   (Sustained by DSM5 Criteria Listed Above)  296.32 (F33.1) Major Depressive Disorder, Recurrent Episode, Moderate With anxious distress.  Other Diagnoses that is relevant to services: none  4. R/O: Bipolar disorder due to history of diagnosis in her chart.  R/O: PTSD due to patient's memory of a past therapist suggesting this.   5. Provisional Diagnosis:  none  6. Prognosis: Relieve Acute Symptoms.  7. Likely consequences of symptoms if not treated: Patient's physical and mental health could further deteriorate.  8. Client strengths include:  caring and motivated .     Recommendations:     1. Plan for Safety and Risk Management:Recommended that patient call 911 or go to the local ED should there be a change in any of these risk factors..  Report to child / adult protection services was NA.     2. Patient's identified no cultural concerns that she would like addressed in therapy.     3. Initial Treatment will focus on: Depressed Mood -  lack of motivation.     4. Resources/Service Plan:       services are not indicated.     Modifications to assist communication are not indicated.     Additional disability accommodations are not indicated.      5. Collaboration:  Collaboration / coordination of treatment will be initiated with the following support professionals: primary care physician and psychiatry.      6.  Referrals:  The following referral(s) will be initiated: [facility]. Next Scheduled Appointment: 6/30/2020.  A Release of Information has been obtained for the following: none.    7. MICHAEL: MICHAEL:  Discussed the general effects of drugs and alcohol on health and well-being. Provider gave patient printed information about the effects of chemical use on their health and well being. Recommendations:  Continue sobriety .     8. Records were reviewed at time of assessment.  Information in this assessment was obtained from the medical record and provided by patient who is a fair historian.   Patient will have open access to their mental health medical record.      Eval type:  Mental Health    Staff Name/Credentials:  LIZBETH Diaz, [facility]  June 23, 2020

## 2020-06-13 ENCOUNTER — COMMUNICATION - HEALTHEAST (OUTPATIENT)
Dept: PALLIATIVE MEDICINE | Facility: OTHER | Age: 65
End: 2020-06-13

## 2020-06-13 DIAGNOSIS — M25.551 PAIN IN JOINT INVOLVING RIGHT PELVIC REGION AND THIGH: ICD-10-CM

## 2020-06-18 ENCOUNTER — HOSPITAL ENCOUNTER (OUTPATIENT)
Dept: PALLIATIVE MEDICINE | Facility: OTHER | Age: 65
Discharge: HOME | End: 2020-06-18
Attending: ANESTHESIOLOGY

## 2020-06-18 DIAGNOSIS — M25.551 PAIN IN JOINT INVOLVING RIGHT PELVIC REGION AND THIGH: ICD-10-CM

## 2020-06-18 DIAGNOSIS — L40.8 OTHER PSORIASIS: ICD-10-CM

## 2020-06-19 ENCOUNTER — COMMUNICATION - HEALTHEAST (OUTPATIENT)
Dept: PALLIATIVE MEDICINE | Facility: OTHER | Age: 65
End: 2020-06-19

## 2020-06-19 DIAGNOSIS — L40.8 OTHER PSORIASIS: ICD-10-CM

## 2020-06-19 DIAGNOSIS — M25.551 PAIN IN JOINT INVOLVING RIGHT PELVIC REGION AND THIGH: ICD-10-CM

## 2020-06-24 ENCOUNTER — VIRTUAL VISIT (OUTPATIENT)
Dept: PSYCHOLOGY | Facility: CLINIC | Age: 65
End: 2020-06-24
Payer: MEDICARE

## 2020-06-24 DIAGNOSIS — F33.1 MDD (MAJOR DEPRESSIVE DISORDER), RECURRENT EPISODE, MODERATE (H): Primary | ICD-10-CM

## 2020-06-24 DIAGNOSIS — F41.8 ANXIETY ASSOCIATED WITH DEPRESSION: ICD-10-CM

## 2020-06-24 PROCEDURE — 90791 PSYCH DIAGNOSTIC EVALUATION: CPT | Mod: 95 | Performed by: SOCIAL WORKER

## 2020-06-24 NOTE — PROGRESS NOTES
"[facility]  Evaluator Name:  Mary Kay Weir     Credentials[facility]  [facility], [facility]    PATIENT'S NAME: Randi Cleary  PREFERRED NAME: Winnie  PREFERRED PRONOUNS: she/her/hers     MRN:   [id number]  :   1955   ACCT. NUMBER: [id number]  DATE OF SERVICE: 2020  START TIME: 8:00 AM  END TIME: 9:00 AM  PREFERRED PHONE: [phone]  May we leave a program related message: Yes    STANDARD ADULT DIAGNOSTIC ASSESSMENT    Telemedicine Visit: The patient's condition can be safely assessed and treated via synchronous audio and visual telemedicine encounter.      Reason for Telemedicine Visit: social distancing due to COVID-19    Originating Site (Patient Location): Patient's home    Distant Site (Provider Location): Provider Remote Setting    Consent:  The patient/guardian has verbally consented to: the potential risks and benefits of telemedicine (video visit) versus in person care; bill my insurance or make self-payment for services provided; and responsibility for payment of non-covered services.     Mode of Communication:  Video Conference via [employer]    As the provider I attest to compliance with applicable laws and regulations related to telemedicine.    Identifying Information:  Patient is a 64 year old, .  The pronoun use throughout this assessment reflects the patient's chosen pronoun.  Patient was referred for an assessment by self.  Patient attended the session alone.     Chief Complaint:   The reason for seeking services at this time is: \" My life's out of control, I have anxiety, the depression has been not in control, I've had so many things happen medically and everything kept on piling on and on and on, I just find it really hard to function, I've turned into a class A hoarder.  Since I was diagnosed with the pulmonary hypertension it is really hard for me to get up and move, I've gained so much weight, just everything is coming down.  Most days if I get one thing done I " "feel accomplished. I can't think straight \"   Patient also shared that she is frequently overwhelmed. The problem(s) began a year ago after her most recent surgery. Patient has attempted to resolve these concerns in the past through therapy, medications.     Does the client have any condition that is currently presenting as a potential to harm themselves or others (severe withdrawal, serious medical condition, severe emotional/behavioral problem)? No.  Proceed with assessment.    Social/Family History:  Patient reported they grew up in [place], [place].  They were raised by biological parents. Parents remained  their whole lives.  Patient's father  when she was 14. Patient's mother  about 30 years ago. Patient reported her sister, 9 years older than her, also served as a parenting figure.    Patient reported that she/her/hers   childhood was good.  Patient described unresolved feelings related to their family of origin, all of whom are now .      The patient could not describe her cultural background other than being from [place], [place].  Cultural influences and impact on patient's life structure, values, norms, and healthcare: Spiritual Beliefs: Grew up going to Hinduism, now doesn't go but identifies as Worship.  Contextual influences on patient's health include: Community Factors social distancing due to COVID-19.    These factors will be addressed in the Preliminary Treatment plan.  Patient identified their preferred language to be English. Patient reported they does not need the assistance of an  or other support involved in therapy.     Patient reported had no significant delays in developmental tasks.   Patient's highest education level was some college. Patient identified the following learning problems: none reported.  Modifications will not be used to assist communication in therapy.   Patient reports they are  able to understand written materials.    Patient reported the " following relationship history: one significant relationship, with her current spouse.  Patient's current relationship status is  for 11 years, together with her spouse for a total of 45 years.   Patient identified their sexual orientation as heterosexual.  Patient reported having zero child(kathy).     Patient's current living/housing situation involves staying in own home/apartment.  They live with their spouse and they report that housing is stable. Patient reported her relationship with her  is conflictual as he does not understand her challenges.  Patient identified spouse and sister-in-law as part of their support system.  Patient identified the quality of these relationships as fair.      Patient is currently disabled since 1998.  Patient reports their finances are obtained through SSDI disability.  Patient does identify finances as a current stressor.  Patient shared she and her  lost their 401k in 2008, but managed to keep the house.     Patient reported that they have not been involved with the legal system.  Patient denies being on probation / parole / under the jurisdiction of the court.        Patient's Strengths and Limitations:  Patient identified the following strengths or resources that will help them succeed in treatment: intelligence and motivation. Things that may interfere with the patient's success in treatment include: financial hardship.   _______________________________________________  Personal and Family Medical History:   Patient did report a family history of mental health concerns.  Patient reports family history includes Alcoholism in her brother and maternal grandfather; Heart Failure in her sister; Hemochromatosis in her brother, father, and sister; Hypertension in her brother and sister; Lupus in her sister; Mental Illness in her maternal uncle; Myocardial Infarction in her father; Obesity in her mother and sister; Scleroderma in her sister; Snoring in her father;  Thyroid Disease in her mother.     Patient reported the following previous diagnoses which include(s): Depression.  Patient reported symptoms began years prior.   Patient has received mental health services in the past: therapy with Shanice Merida LICSW at [facility] and a doctor at unknown location many years prior Psychiatric Hospitalizations: [facility] about 34 years prior.  Patient denies a history of civil commitment.  Currently, patient is receiving other mental health services.  These include psychiatry with Dr. Serrano and Dr. Dubois.  Next appointment: unknown. Dr. Dubois manages the medical marijuana and pain patch she receives while Dr. Serrano manages the rest of her mental health medications.   Patient has had a physical exam to rule out medical causes for current symptoms.  Date of last physical exam was greater than a year ago and client was encouraged to schedule an exam with PCP. The patient has a [facility] Primary Care Provider, who is named Loida Stockton.  Patient reports the following current medical concerns: Patient has many medical concerns, see medical history for details.  There are significant appetite / nutritional concerns / weight changes.   Patient does not report a history of head injury / trauma / cognitive impairment.      Patient reports current meds as:   Outpatient Medications Marked as Taking for the 6/11/20 encounter ([facility] Extended Documentation) with Mary Kay Lan   Medication Sig     albuterol (ACCUNEB) 0.63 MG/3ML neb solution Take 1 ampule by nebulization every 6 hours as needed for shortness of breath / dyspnea or wheezing     ASPIRIN PO Take 81 mg by mouth 2 times daily      busPIRone (BUSPAR) 15 MG tablet Take 15 mg by mouth 3 times daily     BUTRANS 10 MCG/HR WK patch PLACE 1 PATCH ON THE SKIN EVERY 7 DAYS.     Cholecalciferol (VITAMIN D3) 250 MCG (95289 UT) TABS Take 1 tablet by mouth daily 27788/day     Cyanocobalamin (VITAMIN B-12) 5000 MCG SUBL Unknown dose. 2 or  "3 sprays/day     Desvenlafaxine Succinate (PRISTIQ PO) Take 100 mg by mouth daily      diclofenac (VOLTAREN) 1 % topical gel Place onto the skin 4 times daily     furosemide (LASIX) 20 MG tablet Take 2 tablets (40 mg) by mouth daily     levothyroxine (SYNTHROID/LEVOTHROID) 175 MCG tablet Take 1 tablet (175 mcg) by mouth daily     losartan (COZAAR) 25 MG tablet Take 1 tablet (25 mg) by mouth daily     metFORMIN 500 MG PO 24 hr tablet Take 1 tablet by mouth with dinner  X 2 weeks , then 2 tablets X 30 days and if tolerated increase to  3 tablets daily     Omeprazole (PRILOSEC PO) Take 20 mg by mouth 2 times daily (before meals)     potassium chloride ER (KLOR-CON M) 20 MEQ CR tablet Take 1 tablet (20 mEq) by mouth daily     Pramipexole Dihydrochloride (MIRAPEX PO) Take 1 mg by mouth 2 times daily        Medication Adherence:  Patient reports taking prescribed medications as prescribed.    Patient Allergies:    Allergies   Allergen Reactions     Gabapentin      Drove on the wrong side of the highway     Levofloxacin      \"CAN'T REMEMBER\"     Penicillins      \"SORES IN MOUTH\"     Sulfa Drugs      \"PT DOES NOT KNOW WHAT THE REACTION WAS\"       Medical History:    Past Medical History:   Diagnosis Date     Anxiety      Bipolar disorder (H)      Breast cancer (H) 1986    lumpectomy, radiation, chemo     COPD (chronic obstructive pulmonary disease) (H)     asthma     Depression, major, recurrent (H)      Drug tolerance     opioid     Esophageal reflux      Fatigue      Graves disease 1994     Hemochromatosis 02/14/2018    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; H63D not detected     Hyperlipidaemia      Hypertension      Impaired fasting glucose 2017     Joint pain      Morbid obesity (H)      YKAW (obstructive sleep apnea) 2016     Osteopenia      Pheochromocytoma, left 08/02/2017    laparoscopically removed     Postablative hypothyroidism 1995     Psoriasis      Psoriatic arthropathy (H)      RLS (restless legs syndrome)      Sacroiliitis (H)  "     Snoring      Spinal stenosis      Vitamin B 12 deficiency 2009     Vitamin D deficiency 2010         Current Mental Status Exam:   Appearance:  Appropriate    Eye Contact:  Good   Psychomotor:  Normal       Gait / station:  Unable to assess, patient remained sitting for  video session  Attitude / Demeanor: Cooperative   Speech      Rate / Production: Normal/ Responsive      Volume:  Normal  volume      Language:  intact  Mood:   Euthymic  Affect:   Appropriate    Thought Content: Clear   Thought Process: Coherent       Associations: No loosening of associations  Insight:   Fair   Judgment:  Intact   Orientation:  All  Attention/concentration: Fair    Rating Scales:    PHQ9:    PHQ-9 SCORE 9/24/2019 5/5/2020 6/22/2020   PHQ-9 Total Score MyChart - - 15 (Moderately severe depression)   PHQ-9 Total Score 9 17 15   ;    GAD7:    CHAVO-7 SCORE 5/5/2020 6/22/2020   Total Score - 16 (severe anxiety)   Total Score 13 16     CGI:     First:Considering your total clinical experience with this particular patient population, how severe are the patient's symptoms at this time?: 5 (6/24/2020 12:43 PM)  ;    Most recentCompared to the patient's condition at the START of treatment, this patient's condition is: 4 (5/5/2020 10:20 AM)      Substance Use:   Patient did report a family history of substance use concerns; see medical history section for details.  Patient has received chemical dependency treatment in the past at [facility], in her 20s.  Patient has ever been to detox.      Patient is not currently receiving any chemical dependency treatment. Patient reported the following problems as a result of their substance use: none.    Patient denies using alcohol.  Patient denies using tobacco.  Patient reports using marijuana 1 times per day and uses .4 measurement on the eyedropper at a time. Patient started using marijuana at age [age] months ago.  Patient reports last use was recent, but this ran out a week or so prior.   "Patient reports heaviest use is current use. Patient reports using cannabis that is prescribed and managed by her doctor for pain management.  Patient reports using caffeine 4-6 times per day and drinks 1 at a time. Patient started using caffeine at age unknown.  Patient reports using/abusing the following substance(s). Patient reported no other substance use.     CAGE- AID:    CAGE-AID Total Score 6/22/2020   Total Score 4   Total Score MyChart 4 (A total score of 2 or greater is considered clinically significant)       Substance Use: No symptoms    Based on the positive CAGE score and clinical interview there  are not indications of drug or alcohol abuse. 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       Significant Losses / Trauma / Abuse / Neglect Issues:   Patient did not serve in the .  There are indications or report of significant loss, trauma, abuse or neglect issues related to: death of sister's  one year prior, brother two years prior, sister five years prior, mother 30 years prior, and father at age 14. Patient's nephews were taken into foster care and she has not had contact with them as a result.   Concerns for possible neglect are not present.     Safety Assessment:   Current Safety Concerns:  [place] Suicide Severity Rating Scale (Lifetime/Recent)  [place] Suicide Severity Rating (Lifetime/Recent) 5/5/2020 6/11/2020   1. Wish to be Dead (Lifetime) Yes Yes   Wish to be Dead Description (Lifetime) \"When I was going through a couple of  years of counseling from a dysfunctional family about 30 years ago or more\" when patient was in treatment and there were family concerns   1. Wish to be Dead (Recent) No No   2. Non-Specific Active Suicidal Thoughts (Lifetime) Yes Yes   Non-Specific Active Suicidal Thought Description (Lifetime) Had thoughts of killing self -   2. Non-Specific " "Active Suicidal Thoughts (Recent) No No   3. Active Suicidal Ideation with any Methods (Not Plan) Without Intent to Act (Lifetime) Yes Yes   Active Suicidal Ideation with any Methods (Not Plan) Description (Lifetime) Took many pills of Prozac and was sent to the ED 30 years prior   3. Active Sucidal Ideation with any Methods (Not Plan) Without Intent to Act (Recent) No -   4. Active Suicidal Ideation with Some Intent to Act, Without Specific Plan (Lifetime) Yes Yes   Active Suicidal Ideation with Some Intent to Act, Without Specific Plan Description (Lifetime) Took many pills of Prozac and was sent to the ED -   4. Active Suicidal Ideation with Some Intent to Act, Without Specific Plan (Recent) No -   5. Active Suicidal Ideation with Specific Plan and Intent (Lifetime) Yes Yes   Active Suicidal Ideation with Specific Plan and Intent Description (Lifetime) Took many pills of Prozac and was sent to the ED over 30 years ago -   5. Active Suicidal Ideation with Specific Plan and Intent (Recent) No -   Most Severe Ideation Rating (Lifetime) 5 5   Most Severe Ideation Description (Lifetime) 35 years ago \"I just wanted to check out , I had thought of it so much and wanted to be done\" when family problems were happening   Frequency (Lifetime) 4 (No Data)   Comments - patient could not describe frequency   Duration (Lifetime) 2 (No Data)   Comments - patient could not describe duration   Controllability (Lifetime) 3 0   Protective Factors  (Lifetime) 2 5   Reasons for Ideation (Lifetime) 5 (No Data)   Comments - patient could not describe   Most Severe Ideation Rating (Past Month) NA -   Frequency (Past Month) NA -   Duration (Past Month) NA -   Controllability (Past Month) NA -   Protective Factors (Past Month) NA -   Reasons for Ideation (Past Month) NA -   Actual Attempt (Lifetime) Yes Yes   Actual Attempt Description (Lifetime) Took a bunch of pills patient reported overdosing on Prozac about thirty years ago   Total " Number of Actual Attempts (Lifetime) 1 1   Actual Attempt (Past 3 Months) No No   Has subject engaged in non-suicidal self-injurious behavior? (Lifetime) Yes Yes   Has subject engaged in non-suicidal self-injurious behavior? (Past 3 Months) No No   Interrupted Attempts (Lifetime) No No   Interrupted Attempts (Past 3 Months) No No   Aborted or Self-Interrupted Attempt (Lifetime) No No   Total Number Aborted or Self Interrupted Attempts (Lifetime) 0 -   Aborted or Self-Interrupted Attempt (Past 3 Months) No No   Preparatory Acts or Behavior (Lifetime) No No   Preparatory Acts or Behavior (Past 3 Months) No No   Most Recent Attempt Date 45970 (No Data)   Comments - 30 years prior   Most Recent Attempt Actual Lethality Code 2 0   Comments This is a guess/pt. doesn't recall exact month or day -   Most Lethal Attempt Actual Lethality Code 2 -   Comments only 1 attempt/same attempt as most recent & initial -   Initial/First Attempt Date 00785 -   Initial/First Attempt Actual Lethality Code 2 -     Patient denies current homicidal ideation and behaviors.  Patient denies current self-injurious ideation and behaviors.    Patient denied risk behaviors associated with substance use.  Patient denies any high risk behaviors associated with mental health symptoms.  Patient reports the following current concerns for their personal safety: None.  Patient reports there are no firearms in the house.     History of Safety Concerns:  Patient denied a history of homicidal ideation.     Patient denied a history of personal safety concerns.    Patient denied a history of assaultive behaviors.    Patient denied a history of assaultive behaviors.     Patient denied a history of risk behaviors associated with substance use.  Patient denies any history of high risk behaviors associated with mental health symptoms.  Patient reports the following protective factors: safe and stable environment and living with other people    Risk Plan:  See  Preliminary Treatment Plan for Safety and Risk Management Plan    Review of Symptoms per patient report:  Depression: Change in sleep, Change in energy level, Difficulties concentrating, Psychomotor slowing or agitation, Feelings of hopelessness, Feelings of helplessness, Feeling sad, down, or depressed and low motivation  Olga:  No Symptoms  Psychosis: No Symptoms  Anxiety: Excessive worry, Sleep disturbance, Ruminations, Poor concentration and racing thoughts   Panic:  No symptoms  Post Traumatic Stress Disorder:  No Symptoms   Eating Disorder: No Symptoms  ADD / ADHD:  No symptoms  Conduct Disorder: No symptoms  Autism Spectrum Disorder: No symptoms  Obsessive Compulsive Disorder: No Symptoms    Patient reports the following compulsive behaviors and treatment history: none.      Diagnostic Criteria:    - Excessive anxiety and worry about a number of events or activities (such as work or school performance).    - The person finds it difficult to control the worry.   - Being easily fatigued.    - Sleep disturbance (difficulty falling or staying asleep, or restless unsatisfying sleep).   A) Recurrent episode(s) - symptoms have been present during the same 2-week period and represent a change from previous functioning 5 or more symptoms (required for diagnosis)   - Depressed mood. Note: In children and adolescents, can be irritable mood.     - Diminished interest or pleasure in all, or almost all, activities.    - Decreased sleep.    - Psychomotor activity retardation.    - Fatigue or loss of energy.    - Feelings of worthlessness or inappropriate and excessive guilt.    - Diminished ability to think or concentrate, or indecisiveness.   B) The symptoms cause clinically significant distress or impairment in social, occupational, or other important areas of functioning  C) The episode is not attributable to the physiological effects of a substance or to another medical condition  D) The occurence of major depressive  episode is not better explained by other thought / psychotic disorders  E) There has never been a manic episode or hypomanic episode    Functional Status:  Patient reports the following functional impairments: health maintenance, management of the household and or completion of tasks, relationship(s) and self-care.     WHODAS:   WHODAS 2.0 Total Score 2020   Total Score 42   Total Score MyChart 42       Clinical Summary:  1. Reason for assessment: Patient wanted therapy and support for her challenges with depressive and anxious symptoms and her difficulties with her household management.  2. Psychosocial, Cultural and Contextual Factors: Patient's entire family of origin has , she now has a sister-in-law and  as support.  Relationship with  is conflictual.  Patient is reporting increase in physical symptoms due to COVID-19.  Patient would like to get off of pain medications.  3. As evidenced by self report and criteria, client meets the following DSM5 Diagnoses:   (Sustained by DSM5 Criteria Listed Above)  296.32 (F33.1) Major Depressive Disorder, Recurrent Episode, Moderate With anxious distress.  Other Diagnoses that is relevant to services: none  4. R/O: Bipolar disorder due to history of diagnosis in her chart.  R/O: PTSD due to patient's memory of a past therapist suggesting this.   5. Provisional Diagnosis:  none  6. Prognosis: Relieve Acute Symptoms.  7. Likely consequences of symptoms if not treated: Patient's physical and mental health could further deteriorate.  8. Client strengths include:  caring and motivated .     Recommendations:     1. Plan for Safety and Risk Management:Recommended that patient call 911 or go to the local ED should there be a change in any of these risk factors..  Report to child / adult protection services was NA.     2. Patient's identified no cultural concerns that she would like addressed in therapy.     3. Initial Treatment will focus on: Depressed Mood -  lack of motivation.     4. Resources/Service Plan:       services are not indicated.     Modifications to assist communication are not indicated.     Additional disability accommodations are not indicated.      5. Collaboration:  Collaboration / coordination of treatment will be initiated with the following support professionals: primary care physician and psychiatry.      6.  Referrals:  The following referral(s) will be initiated: Outpatient [facility]. Next Scheduled Appointment: 6/30/2020.  A Release of Information has been obtained for the following: none.    7. MICHAEL: MICHAEL:  Discussed the general effects of drugs and alcohol on health and well-being. Provider gave patient printed information about the effects of chemical use on their health and well being. Recommendations:  Continue sobriety .     8. Records were reviewed at time of assessment.  Information in this assessment was obtained from the medical record and provided by patient who is a fair historian.   Patient will have open access to their mental health medical record.      Eval type:  Mental Health    Staff Name/Credentials:  LIZBETH Diaz, [facility]  June 23, 2020

## 2020-06-25 ENCOUNTER — COMMUNICATION - HEALTHEAST (OUTPATIENT)
Dept: PALLIATIVE MEDICINE | Facility: OTHER | Age: 65
End: 2020-06-25

## 2020-06-29 ENCOUNTER — COMMUNICATION - HEALTHEAST (OUTPATIENT)
Dept: PALLIATIVE MEDICINE | Facility: OTHER | Age: 65
End: 2020-06-29

## 2020-06-30 ENCOUNTER — VIRTUAL VISIT (OUTPATIENT)
Dept: PSYCHOLOGY | Facility: CLINIC | Age: 65
End: 2020-06-30
Payer: MEDICARE

## 2020-06-30 DIAGNOSIS — F41.8 ANXIETY ASSOCIATED WITH DEPRESSION: ICD-10-CM

## 2020-06-30 DIAGNOSIS — F33.1 MDD (MAJOR DEPRESSIVE DISORDER), RECURRENT EPISODE, MODERATE (H): Primary | ICD-10-CM

## 2020-06-30 PROCEDURE — 90834 PSYTX W PT 45 MINUTES: CPT | Mod: 95 | Performed by: SOCIAL WORKER

## 2020-06-30 NOTE — PATIENT INSTRUCTIONS
"Call doctor to get in for shoulder surgery.   Clear 10 items from the table, set alarm for 1PM as a reminder to do this.    ______________________________________________________________________    Treatment Plan    Patient's Name: Randi Cleary  YOB: 1955    Date: 2020    DSM5 Diagnoses: 296.32 (F33.1) Major Depressive Disorder, Recurrent Episode, Moderate With anxious distress  Psychosocial / Contextual Factors: Patient's entire family of origin has , she now has a sister-in-law and  as support.  Relationship with  is conflictual.  Patient is reporting increase in physical symptoms due to COVID-19.  Patient would like to get off of pain medications.  WHODAS: 42    Referral / Collaboration:  Referral to another professional/service is not indicated at this time..    Anticipated number of session or this episode of care: 12-15      MeasurableTreatment Goal(s) related to diagnosis / functional impairment(s)  Goal 1: Patient will \"jumpstart, getting going with the things I need to be doing around the house as far as picking up, doing things, trying to do something every day.  Also to lessen the animosity between me and my .\"    I will know I've met my goal when my shoulders are fixed and I can see.      Objective #A (Patient Action)    Patient will increase frequency of engaging her in ADLs.  Status: New - Date: 2020     Intervention(s)  Therapist will engage patient in CBT, specifically behavioral activation.    Objective #B  Patient will track and record at least 5 pleasant exchanges with . Patient will be able to identify at least 5 positive traits about her . and how he relates to her.  Status: New - Date: 2020     Intervention(s)  Therapist will teach assertiveness skills and assign homework related to relationship interactions.    Objective #C  Patient will reduce level of depressive and anxious features as evidenced by reduction in score " on her CHAVO-7 and PHQ-9 (scores of 15 and 16 at first measurment, respectively).  Status: New - Date: 6/30/2020     Intervention(s)  Therapist will engage patient in person-centered therapy and CBT.

## 2020-06-30 NOTE — PROGRESS NOTES
Progress Note    Patient Name: Randi Cleary  Date: 6/30/2020         Service Type: Individual      Session Start Time: 10:30 AM  Session End Time: 11:15 AM     Session Length: 45 minutes    Session #: 3    Attendees: Client attended alone    Telemedicine Visit: The patient's condition can be safely assessed and treated via synchronous audio and visual telemedicine encounter.      Reason for Telemedicine Visit: Services only offered telehealth    Originating Site (Patient Location): Patient's home    Distant Site (Provider Location): Provider Remote Setting    Mode of Communication:  Video Conference via [employer]ter    Consent:  The patient/guardian has verbally consented to: the potential risks and benefits of telemedicine (video visit) versus in person care; bill my insurance or make self-payment for services provided; and responsibility for payment of non-covered services.      Treatment Plan Last Reviewed: Created today (6/30/2020)  PHQ-9 / CHAVO-7 :   PHQ 9/24/2019 5/5/2020 6/22/2020   PHQ-9 Total Score 9 17 15   Q9: Thoughts of better off dead/self-harm past 2 weeks Not at all Not at all Not at all     CHAVO-7 SCORE 5/5/2020 6/22/2020   Total Score - 16 (severe anxiety)   Total Score 13 16         DATA  Interactive Complexity: No  Crisis: No       Progress Since Last Session (Related to Symptoms / Goals / Homework):   Symptoms: No change since previous session    Homework: N/A, last session was assessment      Episode of Care Goals: No improvement - PREPARATION (Decided to change - considering how); Intervened by negotiating a change plan and determining options / strategies for behavior change, identifying triggers, exploring social supports, and working towards setting a date to begin behavior change     Current / Ongoing Stressors and Concerns:   Patient is currently socially isolated. She has a conflictual relationship with her .  She is getting minimal  physical activity due to her pool being closed.      Treatment Objective(s) Addressed in This Session:   Patient will increase frequency of engaging her in ADLs.  Patient will track and record at least 5 pleasant exchanges with . Patient will be able to identify at least 5 positive traits about her .  Patient will reduce level of depressive and anxious features as evidenced by reduction in score on her CHAVO-7 and PHQ-9 (scores of 15 and 16 at first measurment, respectively).     Intervention:   CBT: Treatment Planning: Patient shared that she was out of sorts at the previous session and apologized for this.  Talked about how she has been hesitant to show up for session, but kept her appointment. Talked about how she has felt like a failure for appointments before so she wants to avoid them when she feels like a failure. Talked about her first steps, which she first identified as needing to talk to her doctor about getting rescheduled for her shoulder surgery.  Identified she had cancelled due to her anxiety but realized that this needed to happen in order for her to move forward with other goals, such as completing ADLs.    Discussed motivation and how to get started on something. Discussed not waiting on motivation, but instead setting an alarm and just doing it.  Identified a specific goal of clearing off her kitchen table, had her break this down as she thought it was a few hours of work. Instead set a goal of clearing 10 items, set an alarm to do this at         ASSESSMENT: Current Emotional / Mental Status (status of significant symptoms):   Risk status (Self / Other harm or suicidal ideation)   Patient denies current fears or concerns for personal safety.   Patient denies current or recent suicidal ideation or behaviors.   Patient denies current or recent homicidal ideation or behaviors.   Patient denies current or recent self injurious behavior or ideation.   Patient denies other safety  concerns.   Patient reports there has been no change in risk factors since their last session.     Patient reports there has been no change in protective factors since their last session.     Recommended that patient call 911 or go to the local ED should there be a change in any of these risk factors.     Appearance:   Appropriate    Eye Contact:   Poor   Psychomotor Behavior: Normal    Attitude:   Cooperative    Orientation:   All   Speech    Rate / Production: Normal/ Responsive    Volume:  Normal    Mood:    Anxious    Affect:    Blunted    Thought Content:  Clear    Thought Form:  Coherent    Insight:    Poor      Medication Review:   No changes to current psychiatric medication(s)     Medication Compliance:   Yes     Changes in Health Issues:   None reported     Chemical Use Review:   Substance Use: increase in alcohol .  Patient reports frequency of use once every few weeks.  Provided encouragement towards sobriety        Tobacco Use: No current tobacco use.      Diagnosis:  1. MDD (major depressive disorder), recurrent episode, moderate (H)    2. Anxiety associated with depression        Collateral Reports Completed:   Not Applicable    PLAN: (Patient Tasks / Therapist Tasks / Other)  Call doctor to get in for shoulder surgery.   Clear 10 items from the table, set alarm for 1PM as a reminder to do this.        MICAELA SLADE                                                         ______________________________________________________________________    Treatment Plan    Patient's Name: Randi Cleary  YOB: 1955    Date: 2020    DSM5 Diagnoses: 296.32 (F33.1) Major Depressive Disorder, Recurrent Episode, Moderate With anxious distress  Psychosocial / Contextual Factors: Patient's entire family of origin has , she now has a sister-in-law and  as support.  Relationship with  is conflictual.  Patient is reporting increase in physical symptoms due to COVID-19.  Patient  "would like to get off of pain medications.  WHODAS: 42    Referral / Collaboration:  Referral to another professional/service is not indicated at this time..    Anticipated number of session or this episode of care: 12-15      MeasurableTreatment Goal(s) related to diagnosis / functional impairment(s)  Goal 1: Patient will \"jumpstart, getting going with the things I need to be doing around the house as far as picking up, doing things, trying to do something every day.  Also to lessen the animosity between me and my .\"    I will know I've met my goal when my shoulders are fixed and I can see.      Objective #A (Patient Action)    Patient will increase frequency of engaging her in ADLs.  Status: New - Date: 6/30/2020     Intervention(s)  Therapist will engage patient in CBT, specifically behavioral activation.    Objective #B  Patient will track and record at least 5 pleasant exchanges with . Patient will be able to identify at least 5 positive traits about her . and how he relates to her.  Status: New - Date: 6/30/2020     Intervention(s)  Therapist will teach assertiveness skills and assign homework related to relationship interactions.    Objective #C  Patient will reduce level of depressive and anxious features as evidenced by reduction in score on her CHAVO-7 and PHQ-9 (scores of 15 and 16 at first measurment, respectively).  Status: New - Date: 6/30/2020     Intervention(s)  Therapist will engage patient in person-centered therapy and CBT.    Patient has reviewed and agreed to the above plan.      MICAELA SLADE  June 30, 2020  "

## 2020-07-07 ENCOUNTER — COMMUNICATION - HEALTHEAST (OUTPATIENT)
Dept: INTERNAL MEDICINE | Facility: CLINIC | Age: 65
End: 2020-07-07

## 2020-07-07 ENCOUNTER — TELEPHONE (OUTPATIENT)
Dept: CARDIOLOGY | Facility: CLINIC | Age: 65
End: 2020-07-07

## 2020-07-07 ENCOUNTER — AMBULATORY - HEALTHEAST (OUTPATIENT)
Dept: LAB | Facility: CLINIC | Age: 65
End: 2020-07-07

## 2020-07-07 DIAGNOSIS — I27.20 PULMONARY HYPERTENSION (H): ICD-10-CM

## 2020-07-07 DIAGNOSIS — R06.02 SHORTNESS OF BREATH: ICD-10-CM

## 2020-07-07 NOTE — TELEPHONE ENCOUNTER
Patient called and asked if lab orders could be sent to local clinic prior to OV with Dr. Edwards. Provided me with (f) [phone]. Bindu Walden RN on 7/7/2020 at 2:10 PM    BMP, CBC, and BNP orders faxed. Bindu Walden RN on 7/7/2020 at 2:31 PM

## 2020-07-09 ENCOUNTER — VIRTUAL VISIT (OUTPATIENT)
Dept: PSYCHOLOGY | Facility: CLINIC | Age: 65
End: 2020-07-09
Payer: MEDICARE

## 2020-07-09 DIAGNOSIS — F33.1 MDD (MAJOR DEPRESSIVE DISORDER), RECURRENT EPISODE, MODERATE (H): Primary | ICD-10-CM

## 2020-07-09 DIAGNOSIS — F41.8 ANXIETY ASSOCIATED WITH DEPRESSION: ICD-10-CM

## 2020-07-09 PROCEDURE — 90834 PSYTX W PT 45 MINUTES: CPT | Mod: 95 | Performed by: SOCIAL WORKER

## 2020-07-09 NOTE — PROGRESS NOTES
Progress Note    Patient Name: Randi Cleary  Date: 7/9/2020         Service Type: Individual      Session Start Time: 9:30 AM  Session End Time: 10:20 AM     Session Length: 50 minutes    Session #: 4    Attendees: Client attended alone    Service Modality:  Video Visit:    Telemedicine Visit: The patient's condition can be safely assessed and treated via synchronous audio and visual telemedicine encounter.      Reason for Telemedicine Visit: Services only offered telehealth    Originating Site (Patient Location): Patient's home    Distant Site (Provider Location): Provider Remote Setting    Consent:  The patient/guardian has verbally consented to: the potential risks and benefits of telemedicine (video visit) versus in person care; bill my insurance or make self-payment for services provided; and responsibility for payment of non-covered services.     Mode of Communication:  Video Conference via [employer]flip    As the provider I attest to compliance with applicable laws and regulations related to telemedicine.      Treatment Plan Last Reviewed: 6/30/2020  PHQ-9 / CHAVO-7 :   PHQ 5/5/2020 6/22/2020 7/7/2020   PHQ-9 Total Score 17 15 16   Q9: Thoughts of better off dead/self-harm past 2 weeks Not at all Not at all Not at all     CHAVO-7 SCORE 5/5/2020 6/22/2020 7/7/2020   Total Score - 16 (severe anxiety) 10 (moderate anxiety)   Total Score 13 16 10         DATA  Interactive Complexity: No  Crisis: No       Progress Since Last Session (Related to Symptoms / Goals / Homework):   Symptoms: No change since previous session    Homework: Partially completed  Call doctor to get in for shoulder surgery. -done, appt. On 14th  Clear 10 items from the table, set alarm for 1PM as a reminder to do this. -not done, cleaned counter instead      Episode of Care Goals: No improvement - PREPARATION (Decided to change - considering how); Intervened by negotiating a change plan and determining  options / strategies for behavior change, identifying triggers, exploring social supports, and working towards setting a date to begin behavior change     Current / Ongoing Stressors and Concerns:   Patient is currently socially isolated. She has a conflictual relationship with her .  She is getting minimal physical activity due to her pool being closed.      Treatment Objective(s) Addressed in This Session:   Patient will increase frequency of engaging her in ADLs.  Patient will track and record at least 5 pleasant exchanges with . Patient will be able to identify at least 5 positive traits about her .  Patient will reduce level of depressive and anxious features as evidenced by reduction in score on her CHAVO-7 and PHQ-9 (scores of 15 and 16 at first measurment, respectively).     Intervention:   CBT:   Patient shared some updates on her family and her socialization.  She talked about her frustrations with socialization and isolation due to COVID.  She identified that it was even worse lately due to the heat and her intolerance for the heat.    Patient discussed her diet and her health. She identified what she has been doing to help herself and how to continue to use this to motivate herself.     Patient voiced concerns about finances due to COVID.  Helped patient slow down and sort through her thoughts.     Reviewed homework and identified successes and barriers to completion. Talked about how it felt to follow up with this. Identified next step of cleaning off ten items on the table now that the counter is clear.      ASSESSMENT: Current Emotional / Mental Status (status of significant symptoms):   Risk status (Self / Other harm or suicidal ideation)   Patient denies current fears or concerns for personal safety.   Patient denies current or recent suicidal ideation or behaviors.   Patient denies current or recent homicidal ideation or behaviors.   Patient denies current or recent self  injurious behavior or ideation.   Patient denies other safety concerns.   Patient reports there has been no change in risk factors since their last session.     Patient reports there has been no change in protective factors since their last session.     Recommended that patient call 911 or go to the local ED should there be a change in any of these risk factors.     Appearance:   Appropriate    Eye Contact:   Poor   Psychomotor Behavior: Normal    Attitude:   Cooperative    Orientation:   All   Speech    Rate / Production: Normal/ Responsive    Volume:  Normal    Mood:    Anxious    Affect:    Appropriate    Thought Content:  Clear    Thought Form:  Coherent    Insight:    Poor      Medication Review:   No changes to current psychiatric medication(s)     Medication Compliance:   Yes     Changes in Health Issues:   None reported     Chemical Use Review:   Substance Use: increase in alcohol .  Patient reports frequency of use once every few weeks.  Provided encouragement towards sobriety        Tobacco Use: No current tobacco use.      Diagnosis:  1. MDD (major depressive disorder), recurrent episode, moderate (H)    2. Anxiety associated with depression        Collateral Reports Completed:   Not Applicable    PLAN: (Patient Tasks / Therapist Tasks / Other)  Clear 10 items from the table        B MICAELA BAKER                                                         ______________________________________________________________________    Treatment Plan    Patient's Name: Randi Cleary  YOB: 1955    Date: 2020    DSM5 Diagnoses: 296.32 (F33.1) Major Depressive Disorder, Recurrent Episode, Moderate With anxious distress  Psychosocial / Contextual Factors: Patient's entire family of origin has , she now has a sister-in-law and  as support.  Relationship with  is conflictual.  Patient is reporting increase in physical symptoms due to COVID-19.  Patient would like to get off of  "pain medications.  WHODAS: 42    Referral / Collaboration:  Referral to another professional/service is not indicated at this time..    Anticipated number of session or this episode of care: 12-15      MeasurableTreatment Goal(s) related to diagnosis / functional impairment(s)  Goal 1: Patient will \"jumpstart, getting going with the things I need to be doing around the house as far as picking up, doing things, trying to do something every day.  Also to lessen the animosity between me and my .\"    I will know I've met my goal when my shoulders are fixed and I can see.      Objective #A (Patient Action)    Patient will increase frequency of engaging her in ADLs.  Status: New - Date: 6/30/2020     Intervention(s)  Therapist will engage patient in CBT, specifically behavioral activation.    Objective #B  Patient will track and record at least 5 pleasant exchanges with . Patient will be able to identify at least 5 positive traits about her . and how he relates to her.  Status: New - Date: 6/30/2020     Intervention(s)  Therapist will teach assertiveness skills and assign homework related to relationship interactions.    Objective #C  Patient will reduce level of depressive and anxious features as evidenced by reduction in score on her CHAVO-7 and PHQ-9 (scores of 15 and 16 at first measurment, respectively).  Status: New - Date: 6/30/2020     Intervention(s)  Therapist will engage patient in person-centered therapy and CBT.    Patient has reviewed and agreed to the above plan.      MICAELA SLADE  June 30, 2020  "

## 2020-07-10 ENCOUNTER — MYC MEDICAL ADVICE (OUTPATIENT)
Dept: ENDOCRINOLOGY | Facility: CLINIC | Age: 65
End: 2020-07-10

## 2020-07-13 ENCOUNTER — VIRTUAL VISIT (OUTPATIENT)
Dept: PSYCHOLOGY | Facility: CLINIC | Age: 65
End: 2020-07-13
Payer: MEDICARE

## 2020-07-13 DIAGNOSIS — F33.1 MDD (MAJOR DEPRESSIVE DISORDER), RECURRENT EPISODE, MODERATE (H): Primary | ICD-10-CM

## 2020-07-13 DIAGNOSIS — F41.8 ANXIETY ASSOCIATED WITH DEPRESSION: ICD-10-CM

## 2020-07-13 PROCEDURE — 90834 PSYTX W PT 45 MINUTES: CPT | Mod: 95 | Performed by: SOCIAL WORKER

## 2020-07-13 NOTE — PROGRESS NOTES
Progress Note    Patient Name: Randi Cleary  Date: 7/13/2020         Service Type: Individual      Session Start Time: 4:00 PM  Session End Time: 4:47 PM     Session Length: 47 minutes    Session #: 5    Attendees: Client attended alone    Service Modality:  Video Visit:    Telemedicine Visit: The patient's condition can be safely assessed and treated via synchronous audio and visual telemedicine encounter.      Reason for Telemedicine Visit: Services only offered telehealth    Originating Site (Patient Location): Patient's home    Distant Site (Provider Location): Provider Remote Setting    Consent:  The patient/guardian has verbally consented to: the potential risks and benefits of telemedicine (video visit) versus in person care; bill my insurance or make self-payment for services provided; and responsibility for payment of non-covered services.     Mode of Communication:  Video Conference via [employer]ogon    As the provider I attest to compliance with applicable laws and regulations related to telemedicine.      Treatment Plan Last Reviewed: 6/30/2020  PHQ-9 / CHAVO-7 :   PHQ 5/5/2020 6/22/2020 7/7/2020   PHQ-9 Total Score 17 15 16   Q9: Thoughts of better off dead/self-harm past 2 weeks Not at all Not at all Not at all     CHAVO-7 SCORE 5/5/2020 6/22/2020 7/7/2020   Total Score - 16 (severe anxiety) 10 (moderate anxiety)   Total Score 13 16 10         DATA  Interactive Complexity: No  Crisis: No       Progress Since Last Session (Related to Symptoms / Goals / Homework):   Symptoms: No change since previous session    Homework: Achieved / completed to satisfaction  Clear 10 items from the table -done      Episode of Care Goals: No improvement - PREPARATION (Decided to change - considering how); Intervened by negotiating a change plan and determining options / strategies for behavior change, identifying triggers, exploring social supports, and working towards setting a date  "to begin behavior change     Current / Ongoing Stressors and Concerns:   Patient is currently socially isolated. She has a conflictual relationship with her .  She is getting minimal physical activity due to her pool being closed.      Treatment Objective(s) Addressed in This Session:   Patient will increase frequency of engaging her in ADLs.  Patient will track and record at least 5 pleasant exchanges with . Patient will be able to identify at least 5 positive traits about her .  Patient will reduce level of depressive and anxious features as evidenced by reduction in score on her CHAVO-7 and PHQ-9 (scores of 15 and 16 at first measurment, respectively).     Intervention:   CBT:   Patient talked about how she thought she had \"a malignant family,\" as she had heard Rubia's family described.  She wanted to process family and came very prepared to talk about this. Patient talked about her sister had  and her nephews who she has not had communication with since her sister's death.  Patient talked about feeling left out and hurt by family.  Processed her emotions, provided gave support and validation of patient's emotions.    Reviewed goals and set news for the following week.    ASSESSMENT: Current Emotional / Mental Status (status of significant symptoms):   Risk status (Self / Other harm or suicidal ideation)   Patient denies current fears or concerns for personal safety.   Patient denies current or recent suicidal ideation or behaviors.   Patient denies current or recent homicidal ideation or behaviors.   Patient denies current or recent self injurious behavior or ideation.   Patient denies other safety concerns.   Patient reports there has been no change in risk factors since their last session.     Patient reports there has been no change in protective factors since their last session.     Recommended that patient call 911 or go to the local ED should there be a change in any of these risk " "factors.     Appearance:   Appropriate    Eye Contact:   Poor   Psychomotor Behavior: Normal    Attitude:   Cooperative    Orientation:   All   Speech    Rate / Production: Normal/ Responsive    Volume:  Normal    Mood:    Anxious    Affect:    Appropriate    Thought Content:  Clear    Thought Form:  Coherent    Insight:    Poor      Medication Review:   No changes to current psychiatric medication(s)     Medication Compliance:   Yes     Changes in Health Issues:   None reported     Chemical Use Review:   Substance Use: Chemical use reviewed, no active concerns identified      Tobacco Use: No current tobacco use.      Diagnosis:  1. MDD (major depressive disorder), recurrent episode, moderate (H)    2. Anxiety associated with depression        Collateral Reports Completed:   Not Applicable    PLAN: (Patient Tasks / Therapist Tasks / Other)  Clear 10 more items from the table        B MICAELA BAKER                                                         ______________________________________________________________________    Treatment Plan    Patient's Name: Randi Cleary  YOB: 1955    Date: 2020    DSM5 Diagnoses: 296.32 (F33.1) Major Depressive Disorder, Recurrent Episode, Moderate With anxious distress  Psychosocial / Contextual Factors: Patient's entire family of origin has , she now has a sister-in-law and  as support.  Relationship with  is conflictual.  Patient is reporting increase in physical symptoms due to COVID-19.  Patient would like to get off of pain medications.  WHODAS: 42    Referral / Collaboration:  Referral to another professional/service is not indicated at this time..    Anticipated number of session or this episode of care: 12-15      MeasurableTreatment Goal(s) related to diagnosis / functional impairment(s)  Goal 1: Patient will \"jumpstart, getting going with the things I need to be doing around the house as far as picking up, doing things, " "trying to do something every day.  Also to lessen the animosity between me and my .\"    I will know I've met my goal when my shoulders are fixed and I can see.      Objective #A (Patient Action)    Patient will increase frequency of engaging her in ADLs.  Status: New - Date: 6/30/2020     Intervention(s)  Therapist will engage patient in CBT, specifically behavioral activation.    Objective #B  Patient will track and record at least 5 pleasant exchanges with . Patient will be able to identify at least 5 positive traits about her . and how he relates to her.  Status: New - Date: 6/30/2020     Intervention(s)  Therapist will teach assertiveness skills and assign homework related to relationship interactions.    Objective #C  Patient will reduce level of depressive and anxious features as evidenced by reduction in score on her CHAVO-7 and PHQ-9 (scores of 15 and 16 at first measurment, respectively).  Status: New - Date: 6/30/2020     Intervention(s)  Therapist will engage patient in person-centered therapy and CBT.    Patient has reviewed and agreed to the above plan.      MICAELA SLADE  June 30, 2020  "

## 2020-07-14 ENCOUNTER — AMBULATORY - HEALTHEAST (OUTPATIENT)
Dept: LAB | Facility: CLINIC | Age: 65
End: 2020-07-14

## 2020-07-14 ENCOUNTER — COMMUNICATION - HEALTHEAST (OUTPATIENT)
Dept: INTERNAL MEDICINE | Facility: CLINIC | Age: 65
End: 2020-07-14

## 2020-07-14 DIAGNOSIS — R73.01 IMPAIRED FASTING GLUCOSE: ICD-10-CM
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
## 2020-07-17 ENCOUNTER — COMMUNICATION - HEALTHEAST (OUTPATIENT)
Dept: INTERNAL MEDICINE | Facility: CLINIC | Age: 65
End: 2020-07-17

## 2020-07-17 ENCOUNTER — AMBULATORY - HEALTHEAST (OUTPATIENT)
Dept: LAB | Facility: CLINIC | Age: 65
End: 2020-07-17

## 2020-07-17 DIAGNOSIS — I27.20 PULMONARY HYPERTENSION (H): ICD-10-CM

## 2020-07-17 DIAGNOSIS — R73.01 IMPAIRED FASTING GLUCOSE: ICD-10-CM

## 2020-07-17 DIAGNOSIS — E53.8 VITAMIN B12 DEFICIENCY: ICD-10-CM

## 2020-07-17 DIAGNOSIS — R06.02 SHORTNESS OF BREATH: ICD-10-CM

## 2020-07-17 DIAGNOSIS — E89.0 POSTSURGICAL HYPOTHYROIDISM: ICD-10-CM

## 2020-07-17 DIAGNOSIS — E11.9 DIABETES MELLITUS (H): ICD-10-CM

## 2020-07-17 DIAGNOSIS — E55.9 VITAMIN D DEFICIENCY: ICD-10-CM

## 2020-07-17 LAB
ANION GAP SERPL CALCULATED.3IONS-SCNC: 14 MMOL/L (ref 5–18)
BASOPHILS # BLD AUTO: 0 THOU/UL (ref 0–0.2)
BASOPHILS NFR BLD AUTO: 1 % (ref 0–2)
BUN SERPL-MCNC: 17 MG/DL (ref 8–22)
CALCIUM SERPL-MCNC: 10.4 MG/DL (ref 8.5–10.5)
CHLORIDE BLD-SCNC: 100 MMOL/L (ref 98–107)
CO2 SERPL-SCNC: 26 MMOL/L (ref 22–31)
CREAT SERPL-MCNC: 0.73 MG/DL (ref 0.6–1.1)
EOSINOPHIL # BLD AUTO: 0.2 THOU/UL (ref 0–0.4)
EOSINOPHIL NFR BLD AUTO: 3 % (ref 0–6)
ERYTHROCYTE [DISTWIDTH] IN BLOOD BY AUTOMATED COUNT: 13.9 % (ref 11–14.5)
GFR SERPL CREATININE-BSD FRML MDRD: >60 ML/MIN/1.73M2
GLUCOSE BLD-MCNC: 105 MG/DL (ref 70–125)
HBA1C MFR BLD: 6.2 % (ref 3.5–6)
HCT VFR BLD AUTO: 43.8 % (ref 35–47)
HGB BLD-MCNC: 14.6 G/DL (ref 12–16)
LYMPHOCYTES # BLD AUTO: 1.4 THOU/UL (ref 0.8–4.4)
LYMPHOCYTES NFR BLD AUTO: 18 % (ref 20–40)
MCH RBC QN AUTO: 29.4 PG (ref 27–34)
MCHC RBC AUTO-ENTMCNC: 33.4 G/DL (ref 32–36)
MCV RBC AUTO: 88 FL (ref 80–100)
MONOCYTES # BLD AUTO: 0.5 THOU/UL (ref 0–0.9)
MONOCYTES NFR BLD AUTO: 6 % (ref 2–10)
NEUTROPHILS # BLD AUTO: 5.6 THOU/UL (ref 2–7.7)
NEUTROPHILS NFR BLD AUTO: 72 % (ref 50–70)
NT-PROBNP SERPL-MCNC: 57 PG/ML (ref 0–125)
PLATELET # BLD AUTO: 273 THOU/UL (ref 140–440)
PMV BLD AUTO: 6.3 FL (ref 7–10)
POTASSIUM BLD-SCNC: 3.5 MMOL/L (ref 3.5–5)
RBC # BLD AUTO: 4.97 MILL/UL (ref 3.8–5.4)
SODIUM SERPL-SCNC: 140 MMOL/L (ref 136–145)
T4 FREE SERPL-MCNC: 1 NG/DL (ref 0.7–1.8)
TSH SERPL DL<=0.005 MIU/L-ACNC: 0.99 UIU/ML (ref 0.3–5)
WBC: 7.8 THOU/UL (ref 4–11)

## 2020-07-20 ENCOUNTER — COMMUNICATION - HEALTHEAST (OUTPATIENT)
Dept: INTERNAL MEDICINE | Facility: CLINIC | Age: 65
End: 2020-07-20

## 2020-07-20 NOTE — PROGRESS NOTES
Francisco J Edwards M.D.  Cardiovascular Medicine          Loida Stockton MD    [address] [place] [address]    [phone] (Fax)          Reina Morillo MD    [address], [place] [id number] [phone]     Problem List  1. Possible pulmonary hypertension  2. History of breast cancer              History of mastectomy              Breast reconstruction  3. Atherosclerosis with coronary calcifications  4. Hiatal hernia  5. Hepatic steatosis  6. Diverticulosis  7. KYAW with treatment  8. Psoriasis  9. Grave's disease with ablation  10. Hypertension  11. Asthma  12. Hemochromatosis,gene +  13. Bipolar disorder ?  14. Chronic pain with narcotic dependence  15. Pheochromocytoma (right surgically removed)  16. Elevated body mass index  17. Elevate hemoglobin with macrocytosis  18  Hiatal hernia  19. Chronic pain management    Impression/ Plan  1. Consider SGLT2 to aid fluid removal, diabetes control and reduce cardiac risk  2. She has normal pulmonary vascular resistance and her symptoms are likely secondary to blood pressure control, intra-vascular volume   3. RTC in 6 months    The patient returns for follow-up of heart failure.  There is no interim history of chest pain, tightness, paroxysmal nocturnal dyspnea, orthopnea, but has peripheral edema, but no palpitation, pre-syncope, syncope, .  Exercise tolerance is stable but reduced.  The patient is attempting to exercise regularly and following a sodium restricted, calorically appropriate diet.  Medications are reviewed and the patient is taking medications as prescribed.  The patient is generally sleeping well.        Wt Readings from Last 24 Encounters:   03/09/20 125.6 kg (277 lb)   02/12/20 125.6 kg (277 lb)   12/11/19 119.3 kg (263 lb)   11/18/19 122.5 kg (270 lb)   10/22/19 120.2 kg (265 lb)   09/25/19 118.5 kg (261 lb 3.2 oz)   09/24/19 119 kg (262 lb 4.8 oz)   09/19/19 119.4 kg (263 lb 3.2 oz)    09/13/19 120.7 kg (266 lb 1.6 oz)   09/04/19 118.9 kg (262 lb 3.2 oz)   08/13/19 117.5 kg (259 lb)   06/29/15 92.1 kg (203 lb)       Meds  Current Outpatient Medications   Medication     albuterol (ACCUNEB) 0.63 MG/3ML neb solution     ASPIRIN PO     benzonatate (TESSALON) 100 MG capsule     busPIRone (BUSPAR) 15 MG tablet     BUTRANS 10 MCG/HR WK patch     Cholecalciferol (VITAMIN D3) 250 MCG (52402 UT) TABS     Cyanocobalamin (VITAMIN B-12) 5000 MCG SUBL     Desvenlafaxine Succinate (PRISTIQ PO)     diclofenac (VOLTAREN) 1 % topical gel     furosemide (LASIX) 20 MG tablet     hydrOXYzine (VISTARIL) 25 MG capsule     levothyroxine (SYNTHROID/LEVOTHROID) 175 MCG tablet     losartan (COZAAR) 25 MG tablet     metFORMIN 500 MG PO 24 hr tablet     Omeprazole (PRILOSEC PO)     potassium chloride ER (KLOR-CON M) 20 MEQ CR tablet     Pramipexole Dihydrochloride (MIRAPEX PO)     No current facility-administered medications for this visit.          Labs  Results for RYAN ROBERTO GEETA RIVERA (MRN [id number]) as of 3/16/2020 17:42   Ref. Range 3/9/2020 12:01   Sodium Latest Ref Range: 133 - 144 mmol/L 133   Potassium Latest Ref Range: 3.4 - 5.3 mmol/L 3.0 (L)   Chloride Latest Ref Range: 94 - 109 mmol/L 95   Carbon Dioxide Latest Ref Range: 20 - 32 mmol/L 29   Urea Nitrogen Latest Ref Range: 7 - 30 mg/dL 8   Creatinine Latest Ref Range: 0.52 - 1.04 mg/dL 0.60   GFR Estimate Latest Ref Range: >60 mL/min/1.73_m2 >90   GFR Estimate If Black Latest Ref Range: >60 mL/min/1.73_m2 >90   Calcium Latest Ref Range: 8.5 - 10.1 mg/dL 9.2   Anion Gap Latest Ref Range: 3 - 14 mmol/L 8   Glucose Latest Ref Range: 70 - 99 mg/dL 166 (H)   WBC Latest Ref Range: 4.0 - 11.0 10e9/L 6.8   Hemoglobin Latest Ref Range: 11.7 - 15.7 g/dL 14.3   Hematocrit Latest Ref Range: 35.0 - 47.0 % 44.4   Platelet Count Latest Ref Range: 150 - 450 10e9/L 213   RBC Count Latest Ref Range: 3.8 - 5.2 10e12/L 4.93   MCV Latest Ref Range: 78 - 100 fl 90   MCH Latest Ref  Range: 26.5 - 33.0 pg 29.0   MCHC Latest Ref Range: 31.5 - 36.5 g/dL 32.2   RDW Latest Ref Range: 10.0 - 15.0 % 14.8   Diff Method Unknown Automated Method   % Neutrophils Latest Units: % 80.3   % Lymphocytes Latest Units: % 10.8   % Monocytes Latest Units: % 6.5   % Eosinophils Latest Units: % 1.5   % Basophils Latest Units: % 0.3   % Immature Granulocytes Latest Units: % 0.6   Nucleated RBCs Latest Ref Range: 0 /100 0   Absolute Neutrophil Latest Ref Range: 1.6 - 8.3 10e9/L 5.4   Absolute Lymphocytes Latest Ref Range: 0.8 - 5.3 10e9/L 0.7 (L)   Absolute Monocytes Latest Ref Range: 0.0 - 1.3 10e9/L 0.4   Absolute Eosinophils Latest Ref Range: 0.0 - 0.7 10e9/L 0.1   Absolute Basophils Latest Ref Range: 0.0 - 0.2 10e9/L 0.0   Abs Immature Granulocytes Latest Ref Range: 0 - 0.4 10e9/L 0.0   Absolute Nucleated RBC Unknown 0.0     Imaging     Right Heart Pressures     2019    RA  A-wave: 16 mmHg  V-wave: 16 mmHg  Mean: 15 mmHg   HR: 88 bpm  RV  Systolic: 44 mmHg  End Diastolic: 16 mmHg  HR: 88 bpm    PA  Systolic:41 mmHg  Diasotlic: 24 mmHg  Mean: 31 mmHg  HR: 88 bpm  PA Sat: 70.8%    PCW  A-wave: 18 mmHg  V-wave: 17 mmHg  Mean: 16 mmHg  HR: 91 bpm    Cardiac Output  CO Jeannie: 5.72 L/min  CI Jeannie: 2.56 L/min/m2  CO TD: Not performed  CI TD: Not performed     Vitals  BP: 168 mmHg / 74 mmHg  SpO2: 92 %  PA Stat: 70.8 %         Echocardiogram        Name: ERIK BOSS  MRN: [id number]  : 1955  Study Date: 2019 03:04 PM  Age: 64 yrs  Gender: Female  Patient [facility]  Reason For Study: Pulmonary hypertension (H)  Ordering Physician: MINO CORTES  Referring Physician: MINO CORTES  Performed By: Siobhan Dee RDCS     BSA: 2.2 m2  Height: 66 in  Weight: 259 lb  BP: 118/76 mmHg  _____________________________________________________________________________  __        Procedure  Echocardiogram with two-dimensional, color and spectral Doppler performed.  Poor acoustic windows.  Optison (NDC #4216-9549-33) given intravenously.  Patient was given 3.0 ml mixture of 3 ml Optison and 6 ml saline. 6.0 ml  wasted. IV start location R Hand .  _____________________________________________________________________________  __        Interpretation Summary  1. Global and regional left ventricular function is normal with an EF of 55-  60%.  2. The right ventricle is normal size. Global right ventricular function is  mildly reduced.  3. No significant valvular disease.  4. Unable to assess pulmonary artery pressure.  5. IVC diameter <2.1 cm collapsing >50% with sniff suggests a normal RA  pressure of 3 mmHg.       Assessment/Plan     The patient has evidence of volume overload and poorly controlled blood pressure.      The hemodynamics are not consistent with pure pulmonary arterial hypertension.  With the elevated right atrial pressure and pulmonary capillary pressure and normal cardiac output her pulmonary vascular resistance ( an indicator of pulmonary arterial disease), I think her most appropriate treatment would be diuretic to lower intravascular volume status and better control of her blood pressure which is consistently high here.  Therefore, I would restart lisinopril 5mgs once daily and recheck basic metabolic panel in 3-4 weeks to assess potassium in view of the stopping maxizide and starting furosemide 40 mgs once daily.    I would then increase lisinopril to better control blood pressure.         CC: physicians above and    Dusty Dubois M.D.  ([facility]

## 2020-07-21 ENCOUNTER — RECORDS - HEALTHEAST (OUTPATIENT)
Dept: ADMINISTRATIVE | Facility: OTHER | Age: 65
End: 2020-07-21

## 2020-07-21 ENCOUNTER — COMMUNICATION - HEALTHEAST (OUTPATIENT)
Dept: PALLIATIVE MEDICINE | Facility: OTHER | Age: 65
End: 2020-07-21

## 2020-07-21 DIAGNOSIS — M25.551 PAIN IN JOINT INVOLVING RIGHT PELVIC REGION AND THIGH: ICD-10-CM

## 2020-07-22 ENCOUNTER — RECORDS - HEALTHEAST (OUTPATIENT)
Dept: ADMINISTRATIVE | Facility: OTHER | Age: 65
End: 2020-07-22

## 2020-07-22 ENCOUNTER — VIRTUAL VISIT (OUTPATIENT)
Dept: CARDIOLOGY | Facility: CLINIC | Age: 65
End: 2020-07-22
Attending: INTERNAL MEDICINE
Payer: MEDICARE

## 2020-07-22 DIAGNOSIS — I27.20 PULMONARY HYPERTENSION (H): Primary | ICD-10-CM

## 2020-07-22 DIAGNOSIS — R06.02 SOB (SHORTNESS OF BREATH): ICD-10-CM

## 2020-07-22 PROCEDURE — 99214 OFFICE O/P EST MOD 30 MIN: CPT | Mod: 95 | Performed by: INTERNAL MEDICINE

## 2020-07-22 ASSESSMENT — PAIN SCALES - GENERAL: PAINLEVEL: NO PAIN (0)

## 2020-07-22 NOTE — LETTER
7/22/2020      RE: Randi Cleary  [address] [facility] [address]       Dear Colleague,    Thank you for the opportunity to participate in the care of your patient, Randi Cleary, at the [facility] at [facility]. Please see a copy of my visit note below.    Francisco J Edwards M.D.  Cardiovascular Medicine          Loida Stockton MD    [address], [place] [id number] [phone] (Fax)          Reina Morillo MD    [address] [place], [place] [id number] [phone]     Problem List  1. Possible pulmonary hypertension  2. History of breast cancer              History of mastectomy              Breast reconstruction  3. Atherosclerosis with coronary calcifications  4. Hiatal hernia  5. Hepatic steatosis  6. Diverticulosis  7. KYAW with treatment  8. Psoriasis  9. Grave's disease with ablation  10. Hypertension  11. Asthma  12. Hemochromatosis,gene +  13. Bipolar disorder ?  14. Chronic pain with narcotic dependence  15. Pheochromocytoma (right surgically removed)  16. Elevated body mass index  17. Elevate hemoglobin with macrocytosis  18  Hiatal hernia  19. Chronic pain management    Impression/ Plan  1. Consider SGLT2 to aid fluid removal, diabetes control and reduce cardiac risk  2. She has normal pulmonary vascular resistance and her symptoms are likely secondary to blood pressure control, intra-vascular volume   3. RTC in 6 months    The patient returns for follow-up of heart failure.  There is no interim history of chest pain, tightness, paroxysmal nocturnal dyspnea, orthopnea, but has peripheral edema, but no palpitation, pre-syncope, syncope, .  Exercise tolerance is stable but reduced.  The patient is attempting to exercise regularly and following a sodium restricted, calorically appropriate diet.  Medications are reviewed and the patient is taking medications as prescribed.  The  patient is generally sleeping well.        Wt Readings from Last 24 Encounters:   03/09/20 125.6 kg (277 lb)   02/12/20 125.6 kg (277 lb)   12/11/19 119.3 kg (263 lb)   11/18/19 122.5 kg (270 lb)   10/22/19 120.2 kg (265 lb)   09/25/19 118.5 kg (261 lb 3.2 oz)   09/24/19 119 kg (262 lb 4.8 oz)   09/19/19 119.4 kg (263 lb 3.2 oz)   09/13/19 120.7 kg (266 lb 1.6 oz)   09/04/19 118.9 kg (262 lb 3.2 oz)   08/13/19 117.5 kg (259 lb)   06/29/15 92.1 kg (203 lb)       Meds  Current Outpatient Medications   Medication     albuterol (ACCUNEB) 0.63 MG/3ML neb solution     ASPIRIN PO     benzonatate (TESSALON) 100 MG capsule     busPIRone (BUSPAR) 15 MG tablet     BUTRANS 10 MCG/HR WK patch     Cholecalciferol (VITAMIN D3) 250 MCG (37535 UT) TABS     Cyanocobalamin (VITAMIN B-12) 5000 MCG SUBL     Desvenlafaxine Succinate (PRISTIQ PO)     diclofenac (VOLTAREN) 1 % topical gel     furosemide (LASIX) 20 MG tablet     hydrOXYzine (VISTARIL) 25 MG capsule     levothyroxine (SYNTHROID/LEVOTHROID) 175 MCG tablet     losartan (COZAAR) 25 MG tablet     metFORMIN 500 MG PO 24 hr tablet     Omeprazole (PRILOSEC PO)     potassium chloride ER (KLOR-CON M) 20 MEQ CR tablet     Pramipexole Dihydrochloride (MIRAPEX PO)     No current facility-administered medications for this visit.          Labs  Results for GEETA BOSS (MRN [id number]) as of 3/16/2020 17:42   Ref. Range 3/9/2020 12:01   Sodium Latest Ref Range: 133 - 144 mmol/L 133   Potassium Latest Ref Range: 3.4 - 5.3 mmol/L 3.0 (L)   Chloride Latest Ref Range: 94 - 109 mmol/L 95   Carbon Dioxide Latest Ref Range: 20 - 32 mmol/L 29   Urea Nitrogen Latest Ref Range: 7 - 30 mg/dL 8   Creatinine Latest Ref Range: 0.52 - 1.04 mg/dL 0.60   GFR Estimate Latest Ref Range: >60 mL/min/1.73_m2 >90   GFR Estimate If Black Latest Ref Range: >60 mL/min/1.73_m2 >90   Calcium Latest Ref Range: 8.5 - 10.1 mg/dL 9.2   Anion Gap Latest Ref Range: 3 - 14 mmol/L 8   Glucose Latest Ref Range: 70 - 99  mg/dL 166 (H)   WBC Latest Ref Range: 4.0 - 11.0 10e9/L 6.8   Hemoglobin Latest Ref Range: 11.7 - 15.7 g/dL 14.3   Hematocrit Latest Ref Range: 35.0 - 47.0 % 44.4   Platelet Count Latest Ref Range: 150 - 450 10e9/L 213   RBC Count Latest Ref Range: 3.8 - 5.2 10e12/L 4.93   MCV Latest Ref Range: 78 - 100 fl 90   MCH Latest Ref Range: 26.5 - 33.0 pg 29.0   MCHC Latest Ref Range: 31.5 - 36.5 g/dL 32.2   RDW Latest Ref Range: 10.0 - 15.0 % 14.8   Diff Method Unknown Automated Method   % Neutrophils Latest Units: % 80.3   % Lymphocytes Latest Units: % 10.8   % Monocytes Latest Units: % 6.5   % Eosinophils Latest Units: % 1.5   % Basophils Latest Units: % 0.3   % Immature Granulocytes Latest Units: % 0.6   Nucleated RBCs Latest Ref Range: 0 /100 0   Absolute Neutrophil Latest Ref Range: 1.6 - 8.3 10e9/L 5.4   Absolute Lymphocytes Latest Ref Range: 0.8 - 5.3 10e9/L 0.7 (L)   Absolute Monocytes Latest Ref Range: 0.0 - 1.3 10e9/L 0.4   Absolute Eosinophils Latest Ref Range: 0.0 - 0.7 10e9/L 0.1   Absolute Basophils Latest Ref Range: 0.0 - 0.2 10e9/L 0.0   Abs Immature Granulocytes Latest Ref Range: 0 - 0.4 10e9/L 0.0   Absolute Nucleated RBC Unknown 0.0     Imaging     Right Heart Pressures     2019    RA  A-wave: 16 mmHg  V-wave: 16 mmHg  Mean: 15 mmHg   HR: 88 bpm  RV  Systolic: 44 mmHg  End Diastolic: 16 mmHg  HR: 88 bpm    PA  Systolic:41 mmHg  Diasotlic: 24 mmHg  Mean: 31 mmHg  HR: 88 bpm  PA Sat: 70.8%    PCW  A-wave: 18 mmHg  V-wave: 17 mmHg  Mean: 16 mmHg  HR: 91 bpm    Cardiac Output  CO Jeannie: 5.72 L/min  CI Jeannie: 2.56 L/min/m2  CO TD: Not performed  CI TD: Not performed     Vitals  BP: 168 mmHg / 74 mmHg  SpO2: 92 %  PA Stat: 70.8 %         Echocardiogram        Name: ERIK BOSS  MRN: [id number]  : 1955  Study Date: 2019 03:04 PM  Age: 64 yrs  Gender: Female  Patient Location: [facility]  Reason For Study: Pulmonary hypertension (H)  Ordering Physician: MINO CORTES  Referring Physician:  MINO CORTES  Performed By: Siobhan Dee, [facility]     BSA: 2.2 m2  Height: 66 in  Weight: 259 lb  BP: 118/76 mmHg  _____________________________________________________________________________  __        Procedure  Echocardiogram with two-dimensional, color and spectral Doppler performed.  Poor acoustic windows. Optison (NDC #0471-7468-87) given intravenously.  Patient was given 3.0 ml mixture of 3 ml Optison and 6 ml saline. 6.0 ml  wasted. IV start location R Hand .  _____________________________________________________________________________  __        Interpretation Summary  1. Global and regional left ventricular function is normal with an EF of 55-  60%.  2. The right ventricle is normal size. Global right ventricular function is  mildly reduced.  3. No significant valvular disease.  4. Unable to assess pulmonary artery pressure.  5. IVC diameter <2.1 cm collapsing >50% with sniff suggests a normal RA  pressure of 3 mmHg.       Assessment/Plan     The patient has evidence of volume overload and poorly controlled blood pressure.      The hemodynamics are not consistent with pure pulmonary arterial hypertension.  With the elevated right atrial pressure and pulmonary capillary pressure and normal cardiac output her pulmonary vascular resistance ( an indicator of pulmonary arterial disease), I think her most appropriate treatment would be diuretic to lower intravascular volume status and better control of her blood pressure which is consistently high here.  Therefore, I would restart lisinopril 5mgs once daily and recheck basic metabolic panel in 3-4 weeks to assess potassium in view of the stopping maxizide and starting furosemide 40 mgs once daily.    I would then increase lisinopril to better control blood pressure.         CC: physicians above and    Dusty Dubois M.D.  ( J Mix Tom Green is a 64 year old female who is being evaluated via a  "billable video visit.      The patient has been notified of following:     \"This video visit will be conducted via a call between you and your physician/provider. We have found that certain health care needs can be provided without the need for an in-person physical exam.  This service lets us provide the care you need with a video conversation.  If a prescription is necessary we can send it directly to your pharmacy.  If lab work is needed we can place an order for that and you can then stop by our lab to have the test done at a later time.    Video visits are billed at different rates depending on your insurance coverage.  Please reach out to your insurance provider with any questions.    If during the course of the call the physician/provider feels a video visit is not appropriate, you will not be charged for this service.\"    Patient has given verbal consent for Video visit? Yes  How would you like to obtain your AVS? Mail a copy  If you are dropped from the video visit, the video invite should be resent to: Send to e-mail at: [email or web address]  Will anyone else be joining your video visit? Yes,        Medications were reconciled.     Aparna Lopez CMA    12:29 PM                    "

## 2020-07-22 NOTE — PROGRESS NOTES
"Randi Cleary is a 64 year old female who is being evaluated via a billable video visit.      The patient has been notified of following:     \"This video visit will be conducted via a call between you and your physician/provider. We have found that certain health care needs can be provided without the need for an in-person physical exam.  This service lets us provide the care you need with a video conversation.  If a prescription is necessary we can send it directly to your pharmacy.  If lab work is needed we can place an order for that and you can then stop by our lab to have the test done at a later time.    Video visits are billed at different rates depending on your insurance coverage.  Please reach out to your insurance provider with any questions.    If during the course of the call the physician/provider feels a video visit is not appropriate, you will not be charged for this service.\"    Patient has given verbal consent for Video visit? Yes  How would you like to obtain your AVS? Mail a copy  If you are dropped from the video visit, the video invite should be resent to: Send to e-mail at: [email or web address]  Will anyone else be joining your video visit? Yes,        Medications were reconciled.     Aparna Lopez CMA    12:29 PM                    "

## 2020-07-23 ENCOUNTER — VIRTUAL VISIT (OUTPATIENT)
Dept: PSYCHOLOGY | Facility: CLINIC | Age: 65
End: 2020-07-23
Payer: MEDICARE

## 2020-07-23 DIAGNOSIS — F41.8 ANXIETY ASSOCIATED WITH DEPRESSION: ICD-10-CM

## 2020-07-23 DIAGNOSIS — F33.1 MDD (MAJOR DEPRESSIVE DISORDER), RECURRENT EPISODE, MODERATE (H): Primary | ICD-10-CM

## 2020-07-23 PROCEDURE — 90834 PSYTX W PT 45 MINUTES: CPT | Mod: 95 | Performed by: SOCIAL WORKER

## 2020-07-23 ASSESSMENT — ANXIETY QUESTIONNAIRES
7. FEELING AFRAID AS IF SOMETHING AWFUL MIGHT HAPPEN: SEVERAL DAYS
4. TROUBLE RELAXING: MORE THAN HALF THE DAYS
6. BECOMING EASILY ANNOYED OR IRRITABLE: MORE THAN HALF THE DAYS
3. WORRYING TOO MUCH ABOUT DIFFERENT THINGS: SEVERAL DAYS
5. BEING SO RESTLESS THAT IT IS HARD TO SIT STILL: NEARLY EVERY DAY
1. FEELING NERVOUS, ANXIOUS, OR ON EDGE: NEARLY EVERY DAY
GAD7 TOTAL SCORE: 13
2. NOT BEING ABLE TO STOP OR CONTROL WORRYING: SEVERAL DAYS

## 2020-07-23 ASSESSMENT — PATIENT HEALTH QUESTIONNAIRE - PHQ9: SUM OF ALL RESPONSES TO PHQ QUESTIONS 1-9: 14

## 2020-07-23 NOTE — PROGRESS NOTES
Progress Note    Patient Name: Randi Cleary  Date: 7/23/2020         Service Type: Individual      Session Start Time: 9:31 AM  Session End Time: 10:19 AM     Session Length: 48 minutes    Session #: 6    Attendees: Client attended alone    Service Modality:  Video Visit:    Telemedicine Visit: The patient's condition can be safely assessed and treated via synchronous audio and visual telemedicine encounter.      Reason for Telemedicine Visit: Services only offered telehealth    Originating Site (Patient Location): Patient's home    Distant Site (Provider Location): Provider Remote Setting    Consent:  The patient/guardian has verbally consented to: the potential risks and benefits of telemedicine (video visit) versus in person care; bill my insurance or make self-payment for services provided; and responsibility for payment of non-covered services.     Mode of Communication:  Video Conference via [employer]    As the provider I attest to compliance with applicable laws and regulations related to telemedicine.      Treatment Plan Last Reviewed: 6/30/2020  PHQ-9 / CHAVO-7 :   PHQ 6/22/2020 7/7/2020 7/23/2020   PHQ-9 Total Score 15 16 14   Q9: Thoughts of better off dead/self-harm past 2 weeks Not at all Not at all Not at all     CHAVO-7 SCORE 6/22/2020 7/7/2020 7/23/2020   Total Score 16 (severe anxiety) 10 (moderate anxiety) -   Total Score 16 10 13         DATA  Interactive Complexity: No  Crisis: No       Progress Since Last Session (Related to Symptoms / Goals / Homework):   Symptoms: No change since previous session    Homework: Did not complete  Clear 10 more items from the table -not done      Episode of Care Goals: No improvement - PREPARATION (Decided to change - considering how); Intervened by negotiating a change plan and determining options / strategies for behavior change, identifying triggers, exploring social supports, and working towards setting a date to begin  behavior change     Current / Ongoing Stressors and Concerns:   Patient is currently socially isolated. She has a conflictual relationship with her .  She is getting minimal physical activity due to her pool being closed.      Treatment Objective(s) Addressed in This Session:   Patient will increase frequency of engaging her in ADLs.  Patient will track and record at least 5 pleasant exchanges with . Patient will be able to identify at least 5 positive traits about her .  Patient will reduce level of depressive and anxious features as evidenced by reduction in score on her CHAVO-7 and PHQ-9 (scores of 15 and 16 at first measurment, respectively).     Intervention:   Person-Centered Therapy: Patient was very worried about her  and his unemployment concerns. She talked about all the time she has spent on this and how she was unable to do anything else as a result. Talked about balancing her activity.    Patient talked about how she is coping with the change in the weather overall and the affect it has had on her mood. Talked about how to make sure this next week was a positive week. Identified finding a way to do something else that she enjoyed and was invigorating.         ASSESSMENT: Current Emotional / Mental Status (status of significant symptoms):   Risk status (Self / Other harm or suicidal ideation)   Patient denies current fears or concerns for personal safety.   Patient denies current or recent suicidal ideation or behaviors.   Patient denies current or recent homicidal ideation or behaviors.   Patient denies current or recent self injurious behavior or ideation.   Patient denies other safety concerns.   Patient reports there has been no change in risk factors since their last session.     Patient reports there has been no change in protective factors since their last session.     Recommended that patient call 911 or go to the local ED should there be a change in any of these risk  "factors.     Appearance:   Appropriate    Eye Contact:   Poor   Psychomotor Behavior: Normal    Attitude:   Cooperative    Orientation:   All   Speech    Rate / Production: Normal/ Responsive    Volume:  Normal    Mood:    Anxious    Affect:    Appropriate    Thought Content:  Clear    Thought Form:  Coherent    Insight:    Poor      Medication Review:   No changes to current psychiatric medication(s)     Medication Compliance:   Yes     Changes in Health Issues:   None reported     Chemical Use Review:   Substance Use: Chemical use reviewed, no active concerns identified      Tobacco Use: No current tobacco use.      Diagnosis:  1. MDD (major depressive disorder), recurrent episode, moderate (H)    2. Anxiety associated with depression      Collateral Reports Completed:   Not Applicable    PLAN: (Patient Tasks / Therapist Tasks / Other)  Find a new to activity to do        CRUZ MICAELA BAKER JO                                                         ______________________________________________________________________    Treatment Plan    Patient's Name: Randi Cleary  YOB: 1955    Date: 2020    DSM5 Diagnoses: 296.32 (F33.1) Major Depressive Disorder, Recurrent Episode, Moderate With anxious distress  Psychosocial / Contextual Factors: Patient's entire family of origin has , she now has a sister-in-law and  as support.  Relationship with  is conflictual.  Patient is reporting increase in physical symptoms due to COVID-19.  Patient would like to get off of pain medications.  WHODAS: 42    Referral / Collaboration:  Referral to another professional/service is not indicated at this time..    Anticipated number of session or this episode of care: 12-15      MeasurableTreatment Goal(s) related to diagnosis / functional impairment(s)  Goal 1: Patient will \"jumpstart, getting going with the things I need to be doing around the house as far as picking up, doing things, trying to do " "something every day.  Also to lessen the animosity between me and my .\"    I will know I've met my goal when my shoulders are fixed and I can see.      Objective #A (Patient Action)    Patient will increase frequency of engaging her in ADLs.  Status: New - Date: 6/30/2020     Intervention(s)  Therapist will engage patient in CBT, specifically behavioral activation.    Objective #B  Patient will track and record at least 5 pleasant exchanges with . Patient will be able to identify at least 5 positive traits about her . and how he relates to her.  Status: New - Date: 6/30/2020     Intervention(s)  Therapist will teach assertiveness skills and assign homework related to relationship interactions.    Objective #C  Patient will reduce level of depressive and anxious features as evidenced by reduction in score on her CHAVO-7 and PHQ-9 (scores of 15 and 16 at first measurment, respectively).  Status: New - Date: 6/30/2020     Intervention(s)  Therapist will engage patient in person-centered therapy and CBT.    Patient has reviewed and agreed to the above plan.      MICAELA SLADE  June 30, 2020  "

## 2020-07-24 ASSESSMENT — ANXIETY QUESTIONNAIRES: GAD7 TOTAL SCORE: 13

## 2020-07-28 ENCOUNTER — PATIENT OUTREACH (OUTPATIENT)
Dept: ONCOLOGY | Facility: CLINIC | Age: 65
End: 2020-07-28

## 2020-07-28 ENCOUNTER — COMMUNICATION - HEALTHEAST (OUTPATIENT)
Dept: INTERNAL MEDICINE | Facility: CLINIC | Age: 65
End: 2020-07-28

## 2020-07-28 DIAGNOSIS — E53.8 VITAMIN B12 DEFICIENCY: ICD-10-CM

## 2020-07-28 DIAGNOSIS — E55.9 VITAMIN D DEFICIENCY: ICD-10-CM

## 2020-07-28 NOTE — PROGRESS NOTES
Sadiar placed call to patient to advise that the request for labs to be faxed to [facility] was received and that it would be fine for her to have her labs drawn there as long as they are drawn by Wednesday, August 5 to allow for completion of results for 8/10 video visit with Dr Hill. Patient voiced understanding and will have them drawn within that timeframe. Sadiar faxed current orders to [facility] outpatient lab at [phone].

## 2020-08-03 ENCOUNTER — AMBULATORY - HEALTHEAST (OUTPATIENT)
Dept: LAB | Facility: CLINIC | Age: 65
End: 2020-08-03

## 2020-08-03 DIAGNOSIS — E83.119 HEMOCHROMATOSIS: ICD-10-CM

## 2020-08-03 DIAGNOSIS — E55.9 VITAMIN D DEFICIENCY: ICD-10-CM

## 2020-08-03 DIAGNOSIS — E53.8 VITAMIN B12 DEFICIENCY: ICD-10-CM

## 2020-08-03 LAB
BASOPHILS # BLD AUTO: 0 THOU/UL (ref 0–0.2)
BASOPHILS NFR BLD AUTO: 0 % (ref 0–2)
EOSINOPHIL # BLD AUTO: 0.1 THOU/UL (ref 0–0.4)
EOSINOPHIL NFR BLD AUTO: 1 % (ref 0–6)
ERYTHROCYTE [DISTWIDTH] IN BLOOD BY AUTOMATED COUNT: 14.6 % (ref 11–14.5)
FERRITIN SERPL-MCNC: 24 NG/ML (ref 10–130)
HCT VFR BLD AUTO: 42.4 % (ref 35–47)
HGB BLD-MCNC: 13.5 G/DL (ref 12–16)
IRON SATN MFR SERPL: 16 % (ref 20–50)
IRON SERPL-MCNC: 51 UG/DL (ref 42–175)
LYMPHOCYTES # BLD AUTO: 1.1 THOU/UL (ref 0.8–4.4)
LYMPHOCYTES NFR BLD AUTO: 9 % (ref 20–40)
MCH RBC QN AUTO: 28.5 PG (ref 27–34)
MCHC RBC AUTO-ENTMCNC: 31.8 G/DL (ref 32–36)
MCV RBC AUTO: 90 FL (ref 80–100)
MONOCYTES # BLD AUTO: 0.4 THOU/UL (ref 0–0.9)
MONOCYTES NFR BLD AUTO: 3 % (ref 2–10)
NEUTROPHILS # BLD AUTO: 10.5 THOU/UL (ref 2–7.7)
NEUTROPHILS NFR BLD AUTO: 87 % (ref 50–70)
PLATELET # BLD AUTO: 242 THOU/UL (ref 140–440)
PMV BLD AUTO: 8.6 FL (ref 8.5–12.5)
RBC # BLD AUTO: 4.73 MILL/UL (ref 3.8–5.4)
TIBC SERPL-MCNC: 311 UG/DL (ref 313–563)
TRANSFERRIN SERPL-MCNC: 249 MG/DL (ref 212–360)
VIT B12 SERPL-MCNC: 755 PG/ML (ref 213–816)
WBC: 12.1 THOU/UL (ref 4–11)

## 2020-08-04 LAB — 25(OH)D3 SERPL-MCNC: 68.2 NG/ML (ref 30–80)

## 2020-08-10 ENCOUNTER — VIRTUAL VISIT (OUTPATIENT)
Dept: ONCOLOGY | Facility: CLINIC | Age: 65
End: 2020-08-10
Attending: INTERNAL MEDICINE
Payer: MEDICARE

## 2020-08-10 ENCOUNTER — RECORDS - HEALTHEAST (OUTPATIENT)
Dept: ADMINISTRATIVE | Facility: OTHER | Age: 65
End: 2020-08-10

## 2020-08-10 DIAGNOSIS — E83.119 HEMOCHROMATOSIS, UNSPECIFIED HEMOCHROMATOSIS TYPE: Primary | ICD-10-CM

## 2020-08-10 PROCEDURE — 40001009 ZZH VIDEO/TELEPHONE VISIT; NO CHARGE

## 2020-08-10 PROCEDURE — 99214 OFFICE O/P EST MOD 30 MIN: CPT | Mod: 95 | Performed by: INTERNAL MEDICINE

## 2020-08-10 NOTE — LETTER
"    8/10/2020         RE: Randi Cleary  [address]  [place] [id number]        Dear Colleague,    Thank you for referring your patient, Randi Cleary, to the [facility]. Please see a copy of my visit note below.    Randi Cleary is a 64 year old female who is being evaluated via a billable video visit.          I have reviewed and updated the patient's allergies and medication list. Patient was asked to provide any patient recorded vital signs, height and/or weight.  Please see \"Patient Reported Vital Signs\" tab for that information.        Concerns: Lab Results and has some questions about restless leg syndrome and the Requip she has been taking.    Also has questions about genetic testing.      Refills: None       RAFAEL Flores          Video-Visit Details    Type of service:  Video Visit2    Video Start Time: 1:00 PM  Video End Time: 1:33    Originating Location (pt. Location): Home    Distant Location (provider location):  [facility]     Platform used for Video Visit: Unable to complete video visit after 10 minutes of video visit we switched to phone visit.     Sara Hill MD          This is a followup visit for a diagnosis of hereditary hemochromatosis.  The patient was last seen in 02/2019.      INTERVAL HISTORY:  The patient states that she is struggling to keep the weight down and has gained significant weight during the pandemic.  She also has been having significant issues with mental health.  She has had underlying depression and anxiety issues, but now with the pandemic these have enhanced quite significantly.  Also, the financial crisis related to this pandemic is weighing in on her quite a bit.  Generally, she has not been sick.  She does report that she continues to have shortness of breath.  She was seen by Dr. Edwards in Cardiology.  She carries a diagnosis of pulmonary hypertension.  Otherwise, she denies any bleeding " issues.  No blood in urine or stool.  She is postmenopausal.  No blood from gums or otherwise.  No hemoptysis or hematemesis.  The rest of review of systems was negative.      MEDICAL HISTORY, SURGICAL HISTORY:  Changes as mentioned above.      FAMILY/SOCIAL HISTORY:  Detailed above.        ALLERGIES/MEDICATIONS: Reviewed.      PHYSICAL EXAMINATION:  The initial 10 minutes of the visit was on camera and the patient was moving all extremities.  She was unable to get sound input on my end but I was able to hear her and see her move around the entire house.  Moving all extremities.  Did not seem in any respiratory distress.  Affect was appropriate at the time of my visit, but she said she had been quite depressed since morning.      LABORATORY:  I reviewed her labs in the outside health record system where she got labs done and her labs were remarkable for a hemoglobin of 13.5, WBC count of 12.1 and a platelet count of 242.  She did not endorse any infectious symptoms.  Her neutrophil count absolute was 10.5.  The rest of the differential was unremarkable.  As far as iron labs are concerned, her ferritin was 24, her iron was 51.  Her transferrin was 249.  Her TIBC was 311 and her transferrin saturation was 16.      ASSESSMENT/PLAN:  Patient with a history of hereditary hemochromatosis who had undergone phlebotomy in the past.  Has not been phlebotomized at all in 2020.  Comes in for followup.  Her hemoglobin has drifted down further and her iron stores are not enhancing or improving.  We discussed that she needs to undergo a colonoscopy which she is due for at this point to make sure that she is not losing blood from her GI tract in a slow fashion.  Also, her MRI of the liver that was advised at the visit last time has not been done yet, so we would coordinate that.  Otherwise, I will see the patient back in 3 months' time frame.     Sara Hill   of Medicine   Hematology and medical Oncology    [facility]

## 2020-08-10 NOTE — PROGRESS NOTES
"Randi Cleary is a 64 year old female who is being evaluated via a billable video visit.      The patient has been notified of following:     \"This video visit will be conducted via a call between you and your physician/provider. We have found that certain health care needs can be provided without the need for an in-person physical exam.  This service lets us provide the care you need with a video conversation.  If a prescription is necessary we can send it directly to your pharmacy.  If lab work is needed we can place an order for that and you can then stop by our lab to have the test done at a later time.    Video visits are billed at different rates depending on your insurance coverage.  Please reach out to your insurance provider with any questions.    If during the course of the call the physician/provider feels a video visit is not appropriate, you will not be charged for this service.\"    Patient has given verbal consent for Video visit? Yes    How would you like to obtain your AVS? MyChart     If you are dropped from the video visit, the video invite should be resent to: Send to e-mail at: [email or web address]     Will anyone else be joining your video visit? No          I have reviewed and updated the patient's allergies and medication list. Patient was asked to provide any patient recorded vital signs, height and/or weight.  Please see \"Patient Reported Vital Signs\" tab for that information.        Concerns: Lab Results and has some questions about restless leg syndrome and the Requip she has been taking.    Also has questions about genetic testing.      Refills: None       RAFAEL Flores          Video-Visit Details    Type of service:  Video Visit2    Video Start Time: 1:00 PM  Video End Time: 1:33    Originating Location (pt. Location): Home    Distant Location (provider location):  [facility]     Platform used for Video Visit: Unable to complete video visit after 10 " minutes of video visit we switched to phone visit.     Sara Hill MD

## 2020-08-10 NOTE — PROGRESS NOTES
This is a followup visit for a diagnosis of hereditary hemochromatosis.  The patient was last seen in 02/2019.      INTERVAL HISTORY:  The patient states that she is struggling to keep the weight down and has gained significant weight during the pandemic.  She also has been having significant issues with mental health.  She has had underlying depression and anxiety issues, but now with the pandemic these have enhanced quite significantly.  Also, the financial crisis related to this pandemic is weighing in on her quite a bit.  Generally, she has not been sick.  She does report that she continues to have shortness of breath.  She was seen by Dr. Edwards in Cardiology.  She carries a diagnosis of pulmonary hypertension.  Otherwise, she denies any bleeding issues.  No blood in urine or stool.  She is postmenopausal.  No blood from gums or otherwise.  No hemoptysis or hematemesis.  The rest of review of systems was negative.      MEDICAL HISTORY, SURGICAL HISTORY:  Changes as mentioned above.      FAMILY/SOCIAL HISTORY:  Detailed above.        ALLERGIES/MEDICATIONS: Reviewed.      PHYSICAL EXAMINATION:  The initial 10 minutes of the visit was on camera and the patient was moving all extremities.  She was unable to get sound input on my end but I was able to hear her and see her move around the entire house.  Moving all extremities.  Did not seem in any respiratory distress.  Affect was appropriate at the time of my visit, but she said she had been quite depressed since morning.      LABORATORY:  I reviewed her labs in the outside health record system where she got labs done and her labs were remarkable for a hemoglobin of 13.5, WBC count of 12.1 and a platelet count of 242.  She did not endorse any infectious symptoms.  Her neutrophil count absolute was 10.5.  The rest of the differential was unremarkable.  As far as iron labs are concerned, her ferritin was 24, her iron was 51.  Her transferrin was 249.  Her TIBC was  311 and her transferrin saturation was 16.      ASSESSMENT/PLAN:  Patient with a history of hereditary hemochromatosis who had undergone phlebotomy in the past.  Has not been phlebotomized at all in 2020.  Comes in for followup.  Her hemoglobin has drifted down further and her iron stores are not enhancing or improving.  We discussed that she needs to undergo a colonoscopy which she is due for at this point to make sure that she is not losing blood from her GI tract in a slow fashion.  Also, her MRI of the liver that was advised at the visit last time has not been done yet, so we would coordinate that.  Otherwise, I will see the patient back in 3 months' time frame.     Sara Hill   of Medicine   Hematology and medical Oncology   [facility]

## 2020-08-12 ENCOUNTER — VIRTUAL VISIT (OUTPATIENT)
Dept: PSYCHOLOGY | Facility: CLINIC | Age: 65
End: 2020-08-12
Payer: MEDICARE

## 2020-08-12 DIAGNOSIS — F41.8 ANXIETY ASSOCIATED WITH DEPRESSION: ICD-10-CM

## 2020-08-12 DIAGNOSIS — F33.1 MDD (MAJOR DEPRESSIVE DISORDER), RECURRENT EPISODE, MODERATE (H): Primary | ICD-10-CM

## 2020-08-12 PROCEDURE — 90834 PSYTX W PT 45 MINUTES: CPT | Mod: 95 | Performed by: SOCIAL WORKER

## 2020-08-12 NOTE — PROGRESS NOTES
Progress Note    Patient Name: Randi Cleary  Date: 8/12/2020         Service Type: Individual      Session Start Time: 3:00 PM  Session End Time: 3:47 PM     Session Length: 47 minutes    Session #: 7    Attendees: Client attended alone    Service Modality:  Video Visit:    Telemedicine Visit: The patient's condition can be safely assessed and treated via synchronous audio and visual telemedicine encounter.      Reason for Telemedicine Visit: Services only offered telehealth    Originating Site (Patient Location): Patient's home    Distant Site (Provider Location): Provider Remote Setting    Consent:  The patient/guardian has verbally consented to: the potential risks and benefits of telemedicine (video visit) versus in person care; bill my insurance or make self-payment for services provided; and responsibility for payment of non-covered services.     Mode of Communication:  Video Conference via [employer]    As the provider I attest to compliance with applicable laws and regulations related to telemedicine.      Treatment Plan Last Reviewed: 6/30/2020  PHQ-9 / CHAVO-7 :   PHQ 7/7/2020 7/23/2020 8/12/2020   PHQ-9 Total Score 16 14 18   Q9: Thoughts of better off dead/self-harm past 2 weeks Not at all Not at all Several days   F/U: Thoughts of suicide or self-harm - - No   F/U: Safety concerns - - No     CHAVO-7 SCORE 7/7/2020 7/23/2020 8/12/2020   Total Score 10 (moderate anxiety) - 11 (moderate anxiety)   Total Score 10 13 11         DATA  Interactive Complexity: No  Crisis: No       Progress Since Last Session (Related to Symptoms / Goals / Homework):   Symptoms: Worsening patient has had a low mood as her birthday is approaching    Homework: Achieved / completed to satisfaction  Find a new to activity to do -done      Episode of Care Goals: No improvement - PREPARATION (Decided to change - considering how); Intervened by negotiating a change plan and determining options /  strategies for behavior change, identifying triggers, exploring social supports, and working towards setting a date to begin behavior change     Current / Ongoing Stressors and Concerns:   Patient is currently socially isolated. She has a conflictual relationship with her .  She is getting minimal physical activity due to her pool being closed.      Treatment Objective(s) Addressed in This Session:   Patient will increase frequency of engaging her in ADLs.  Patient will track and record at least 5 pleasant exchanges with . Patient will be able to identify at least 5 positive traits about her .  Patient will reduce level of depressive and anxious features as evidenced by reduction in score on her CHAVO-7 and PHQ-9 (scores of 15 and 16 at first measurment, respectively).     Intervention:   Person-Centered Therapy: Patient had indicated on her PHQ-9 that she had experienced several days where she wished she were dead. Completed a shortened columbia assessment and further explored this. Patient was able to indicate that she felt overwhelmed by all of her physical ailments at times, but does not actually want to die.     Patient then shifted to talking about some of things she is hopeful for, including that she has time with her friends to go shopping and that her pool will be opening soon.      Patient talked for awhile about her brother dying at 71 and how she is getting closer to this age.  She reports wanting help from her family, but there is no one who can help her now. Talked about how to get help from her .     Talked about taking control and finding something that brings her britney.  Patient was able to identify being outside as doing this, so set a goal to do this.      ASSESSMENT: Current Emotional / Mental Status (status of significant symptoms):   Risk status (Self / Other harm or suicidal ideation)   Patient denies current fears or concerns for personal safety.   Patient reports the  following current or recent suicidal ideation or behaviors: patient has thought that she can't just keep doing this and dealing with all of the health issues.   Patient denies current or recent homicidal ideation or behaviors.   Patient denies current or recent self injurious behavior or ideation.   Patient denies other safety concerns.   Patient reports there has been no change in risk factors since their last session.     Patient reports there has been no change in protective factors since their last session.     Recommended that patient call 911 or go to the local ED should there be a change in any of these risk factors.     Appearance:   Appropriate    Eye Contact:   Poor   Psychomotor Behavior: Normal    Attitude:   Cooperative    Orientation:   All   Speech    Rate / Production: Normal/ Responsive    Volume:  Normal    Mood:    Anxious    Affect:    Appropriate    Thought Content:  Clear    Thought Form:  Coherent    Insight:    Fair      Medication Review:   No changes to current psychiatric medication(s)     Medication Compliance:   Yes     Changes in Health Issues:   None reported     Chemical Use Review:   Substance Use: Chemical use reviewed, no active concerns identified      Tobacco Use: No current tobacco use.      Diagnosis:  1. MDD (major depressive disorder), recurrent episode, moderate (H)    2. Anxiety associated with depression      Collateral Reports Completed:   Not Applicable    PLAN: (Patient Tasks / Therapist Tasks / Other)  Get outside          MICAELA SLADE                                                         ______________________________________________________________________    Treatment Plan    Patient's Name: Randi Cleary  YOB: 1955    Date: 2020    DSM5 Diagnoses: 296.32 (F33.1) Major Depressive Disorder, Recurrent Episode, Moderate With anxious distress  Psychosocial / Contextual Factors: Patient's entire family of origin has , she now has a  "sister-in-law and  as support.  Relationship with  is conflictual.  Patient is reporting increase in physical symptoms due to COVID-19.  Patient would like to get off of pain medications.  WHODAS: 42    Referral / Collaboration:  Referral to another professional/service is not indicated at this time..    Anticipated number of session or this episode of care: 12-15      MeasurableTreatment Goal(s) related to diagnosis / functional impairment(s)  Goal 1: Patient will \"jumpstart, getting going with the things I need to be doing around the house as far as picking up, doing things, trying to do something every day.  Also to lessen the animosity between me and my .\"    I will know I've met my goal when my shoulders are fixed and I can see.      Objective #A (Patient Action)    Patient will increase frequency of engaging her in ADLs.  Status: New - Date: 6/30/2020     Intervention(s)  Therapist will engage patient in CBT, specifically behavioral activation.    Objective #B  Patient will track and record at least 5 pleasant exchanges with . Patient will be able to identify at least 5 positive traits about her . and how he relates to her.  Status: New - Date: 6/30/2020     Intervention(s)  Therapist will teach assertiveness skills and assign homework related to relationship interactions.    Objective #C  Patient will reduce level of depressive and anxious features as evidenced by reduction in score on her CHAVO-7 and PHQ-9 (scores of 15 and 16 at first measurment, respectively).  Status: New - Date: 6/30/2020     Intervention(s)  Therapist will engage patient in person-centered therapy and CBT.    Patient has reviewed and agreed to the above plan.      MICAELA SLADE  June 30, 2020  "

## 2020-08-17 ENCOUNTER — COMMUNICATION - HEALTHEAST (OUTPATIENT)
Dept: PALLIATIVE MEDICINE | Facility: OTHER | Age: 65
End: 2020-08-17

## 2020-08-17 DIAGNOSIS — M25.551 PAIN IN JOINT INVOLVING RIGHT PELVIC REGION AND THIGH: ICD-10-CM

## 2020-08-19 ENCOUNTER — VIRTUAL VISIT (OUTPATIENT)
Dept: PSYCHOLOGY | Facility: CLINIC | Age: 65
End: 2020-08-19
Payer: MEDICARE

## 2020-08-19 DIAGNOSIS — F33.1 MDD (MAJOR DEPRESSIVE DISORDER), RECURRENT EPISODE, MODERATE (H): Primary | ICD-10-CM

## 2020-08-19 DIAGNOSIS — F41.8 ANXIETY ASSOCIATED WITH DEPRESSION: ICD-10-CM

## 2020-08-19 PROCEDURE — 90834 PSYTX W PT 45 MINUTES: CPT | Mod: 95 | Performed by: SOCIAL WORKER

## 2020-08-19 NOTE — PROGRESS NOTES
Progress Note    Patient Name: Randi Cleary  Date: 8/19/2020         Service Type: Individual      Session Start Time: 12:56 PM  Session End Time: 1:40 PM     Session Length: 44 minutes    Session #: 8    Attendees: Client attended alone    Service Modality:  Video Visit:    Telemedicine Visit: The patient's condition can be safely assessed and treated via synchronous audio and visual telemedicine encounter.      Reason for Telemedicine Visit: Services only offered telehealth    Originating Site (Patient Location): Patient's home    Distant Site (Provider Location): Provider Remote Setting    Consent:  The patient/guardian has verbally consented to: the potential risks and benefits of telemedicine (video visit) versus in person care; bill my insurance or make self-payment for services provided; and responsibility for payment of non-covered services.     Mode of Communication:  Video Conference via [employer]    As the provider I attest to compliance with applicable laws and regulations related to telemedicine.      Treatment Plan Last Reviewed: 6/30/2020  PHQ-9 / CHAVO-7 :   PHQ 7/7/2020 7/23/2020 8/12/2020   PHQ-9 Total Score 16 14 18   Q9: Thoughts of better off dead/self-harm past 2 weeks Not at all Not at all Several days   F/U: Thoughts of suicide or self-harm - - No   F/U: Safety concerns - - No     CHAVO-7 SCORE 7/7/2020 7/23/2020 8/12/2020   Total Score 10 (moderate anxiety) - 11 (moderate anxiety)   Total Score 10 13 11         DATA  Interactive Complexity: No  Crisis: No       Progress Since Last Session (Related to Symptoms / Goals / Homework):   Symptoms: No change patient is still distressed about the pain in her legs    Homework: Achieved / completed to satisfaction  Get outside -done      Episode of Care Goals: No improvement - PREPARATION (Decided to change - considering how); Intervened by negotiating a change plan and determining options / strategies for  behavior change, identifying triggers, exploring social supports, and working towards setting a date to begin behavior change     Current / Ongoing Stressors and Concerns:   Patient is currently socially isolated. She has a conflictual relationship with her .  She is getting minimal physical activity due to her pool being closed.      Treatment Objective(s) Addressed in This Session:   Patient will increase frequency of engaging her in ADLs.  Patient will track and record at least 5 pleasant exchanges with . Patient will be able to identify at least 5 positive traits about her .  Patient will reduce level of depressive and anxious features as evidenced by reduction in score on her CHAVO-7 and PHQ-9 (scores of 15 and 16 at first measurment, respectively).     Intervention:   Person-Centered Therapy: Patient focused on her weight and how it was affecting her health and therefor her mental health.  Talked about her concerns about how to move forward. Identified she would start using the pool again.    Then processed her birthday and how this was for her. Discussed how it was to make time for herself.      ASSESSMENT: Current Emotional / Mental Status (status of significant symptoms):   Risk status (Self / Other harm or suicidal ideation)   Patient denies current fears or concerns for personal safety.   Patient denies current or recent suicidal ideation or behaviors.   Patient denies current or recent homicidal ideation or behaviors.   Patient denies current or recent self injurious behavior or ideation.   Patient denies other safety concerns.   Patient reports there has been no change in risk factors since their last session.     Patient reports there has been no change in protective factors since their last session.     Recommended that patient call 911 or go to the local ED should there be a change in any of these risk factors.     Appearance:   Appropriate    Eye Contact:   Poor   Psychomotor  "Behavior: Normal    Attitude:   Cooperative    Orientation:   All   Speech    Rate / Production: Normal/ Responsive    Volume:  Normal    Mood:    Anxious    Affect:    Appropriate    Thought Content:  Clear    Thought Form:  Coherent    Insight:    Fair      Medication Review:   No changes to current psychiatric medication(s)     Medication Compliance:   Yes     Changes in Health Issues:   Yes: restless legs, which are causing her distress     Chemical Use Review:   Substance Use: Chemical use reviewed, no active concerns identified      Tobacco Use: No current tobacco use.      Diagnosis:  1. MDD (major depressive disorder), recurrent episode, moderate (H)    2. Anxiety associated with depression      Collateral Reports Completed:   Not Applicable    PLAN: (Patient Tasks / Therapist Tasks / Other)  Get into the pool          CRUZ MICAELA BAKER JO                                                         ______________________________________________________________________    Treatment Plan    Patient's Name: Randi Cleary  YOB: 1955    Date: 2020    DSM5 Diagnoses: 296.32 (F33.1) Major Depressive Disorder, Recurrent Episode, Moderate With anxious distress  Psychosocial / Contextual Factors: Patient's entire family of origin has , she now has a sister-in-law and  as support.  Relationship with  is conflictual.  Patient is reporting increase in physical symptoms due to COVID-19.  Patient would like to get off of pain medications.  WHODAS: 42    Referral / Collaboration:  Referral to another professional/service is not indicated at this time..    Anticipated number of session or this episode of care: 12-15      MeasurableTreatment Goal(s) related to diagnosis / functional impairment(s)  Goal 1: Patient will \"jumpstart, getting going with the things I need to be doing around the house as far as picking up, doing things, trying to do something every day.  Also to lessen the " "animosity between me and my .\"    I will know I've met my goal when my shoulders are fixed and I can see.      Objective #A (Patient Action)    Patient will increase frequency of engaging her in ADLs.  Status: New - Date: 6/30/2020     Intervention(s)  Therapist will engage patient in CBT, specifically behavioral activation.    Objective #B  Patient will track and record at least 5 pleasant exchanges with . Patient will be able to identify at least 5 positive traits about her . and how he relates to her.  Status: New - Date: 6/30/2020     Intervention(s)  Therapist will teach assertiveness skills and assign homework related to relationship interactions.    Objective #C  Patient will reduce level of depressive and anxious features as evidenced by reduction in score on her CHAVO-7 and PHQ-9 (scores of 15 and 16 at first measurment, respectively).  Status: New - Date: 6/30/2020     Intervention(s)  Therapist will engage patient in person-centered therapy and CBT.    Patient has reviewed and agreed to the above plan.      MICAELA SLADE  June 30, 2020  "

## 2020-08-20 ENCOUNTER — RECORDS - HEALTHEAST (OUTPATIENT)
Dept: ADMINISTRATIVE | Facility: OTHER | Age: 65
End: 2020-08-20

## 2020-08-24 ENCOUNTER — HOSPITAL ENCOUNTER (OUTPATIENT)
Dept: PALLIATIVE MEDICINE | Facility: OTHER | Age: 65
Discharge: HOME | End: 2020-08-24
Attending: ANESTHESIOLOGY

## 2020-08-24 DIAGNOSIS — M25.551 PAIN IN JOINT INVOLVING RIGHT PELVIC REGION AND THIGH: ICD-10-CM

## 2020-08-25 ENCOUNTER — COMMUNICATION - HEALTHEAST (OUTPATIENT)
Dept: PALLIATIVE MEDICINE | Facility: OTHER | Age: 65
End: 2020-08-25

## 2020-08-25 ENCOUNTER — COMMUNICATION - HEALTHEAST (OUTPATIENT)
Dept: SCHEDULING | Facility: CLINIC | Age: 65
End: 2020-08-25

## 2020-08-25 DIAGNOSIS — G25.81 RESTLESS LEGS SYNDROME (RLS): ICD-10-CM

## 2020-08-26 ENCOUNTER — COMMUNICATION - HEALTHEAST (OUTPATIENT)
Dept: SCHEDULING | Facility: CLINIC | Age: 65
End: 2020-08-26

## 2020-08-27 ENCOUNTER — RECORDS - HEALTHEAST (OUTPATIENT)
Dept: HEALTH INFORMATION MANAGEMENT | Facility: CLINIC | Age: 65
End: 2020-08-27

## 2020-08-27 ENCOUNTER — AMBULATORY - HEALTHEAST (OUTPATIENT)
Dept: NURSING | Facility: CLINIC | Age: 65
End: 2020-08-27

## 2020-08-27 ENCOUNTER — COMMUNICATION - HEALTHEAST (OUTPATIENT)
Dept: INTERNAL MEDICINE | Facility: CLINIC | Age: 65
End: 2020-08-27

## 2020-08-27 ENCOUNTER — COMMUNICATION - HEALTHEAST (OUTPATIENT)
Dept: SCHEDULING | Facility: CLINIC | Age: 65
End: 2020-08-27

## 2020-08-27 ENCOUNTER — OFFICE VISIT - HEALTHEAST (OUTPATIENT)
Dept: FAMILY MEDICINE | Facility: CLINIC | Age: 65
End: 2020-08-27

## 2020-08-27 ENCOUNTER — TELEPHONE (OUTPATIENT)
Dept: CARDIOLOGY | Facility: CLINIC | Age: 65
End: 2020-08-27

## 2020-08-27 DIAGNOSIS — F41.1 ANXIETY STATE: ICD-10-CM

## 2020-08-27 DIAGNOSIS — I15.2 HYPERTENSION DUE TO ENDOCRINE DISORDER: ICD-10-CM

## 2020-08-27 DIAGNOSIS — R61 SWEATING INCREASE: ICD-10-CM

## 2020-08-27 DIAGNOSIS — F51.02 ADJUSTMENT INSOMNIA: ICD-10-CM

## 2020-08-27 DIAGNOSIS — R19.7 DIARRHEA, UNSPECIFIED TYPE: ICD-10-CM

## 2020-08-27 LAB
ALBUMIN SERPL-MCNC: 3.7 G/DL (ref 3.5–5)
ALP SERPL-CCNC: 89 U/L (ref 45–120)
ALT SERPL W P-5'-P-CCNC: 19 U/L (ref 0–45)
ANION GAP SERPL CALCULATED.3IONS-SCNC: 13 MMOL/L (ref 5–18)
AST SERPL W P-5'-P-CCNC: 22 U/L (ref 0–40)
BILIRUB SERPL-MCNC: 0.5 MG/DL (ref 0–1)
BUN SERPL-MCNC: 12 MG/DL (ref 8–22)
CALCIUM SERPL-MCNC: 9.7 MG/DL (ref 8.5–10.5)
CHLORIDE BLD-SCNC: 104 MMOL/L (ref 98–107)
CO2 SERPL-SCNC: 24 MMOL/L (ref 22–31)
CREAT SERPL-MCNC: 0.76 MG/DL (ref 0.6–1.1)
GFR SERPL CREATININE-BSD FRML MDRD: >60 ML/MIN/1.73M2
GLUCOSE BLD-MCNC: 163 MG/DL (ref 70–125)
POTASSIUM BLD-SCNC: 3.9 MMOL/L (ref 3.5–5)
PROT SERPL-MCNC: 7.3 G/DL (ref 6–8)
SODIUM SERPL-SCNC: 141 MMOL/L (ref 136–145)

## 2020-08-27 NOTE — TELEPHONE ENCOUNTER
"Patient verbalizing frustration and didn't know who else to call. She stated she hasn't been able to sleep for over a week and having restless legs. She has been on and off her anti-depressant since Sunday, stating she cannot sleep and \"is up all night.\" She is feeling terrible and tried to explain to multiple psychiatrists her condition. Patient now complaining of nausea, dizziness, weakness, and no appetite. She states she will be going to the ED this evening. Patient is worried she is acidotic. Bindu Walden RN on 8/27/2020 at 3:34 PM    "

## 2020-09-01 ENCOUNTER — VIRTUAL VISIT (OUTPATIENT)
Dept: PSYCHOLOGY | Facility: CLINIC | Age: 65
End: 2020-09-01
Payer: MEDICARE

## 2020-09-01 DIAGNOSIS — F41.8 ANXIETY ASSOCIATED WITH DEPRESSION: ICD-10-CM

## 2020-09-01 DIAGNOSIS — F41.1 GAD (GENERALIZED ANXIETY DISORDER): ICD-10-CM

## 2020-09-01 DIAGNOSIS — F33.1 MDD (MAJOR DEPRESSIVE DISORDER), RECURRENT EPISODE, MODERATE (H): Primary | ICD-10-CM

## 2020-09-01 PROCEDURE — 90834 PSYTX W PT 45 MINUTES: CPT | Mod: 95 | Performed by: SOCIAL WORKER

## 2020-09-01 NOTE — PATIENT INSTRUCTIONS
Blood drawn and sent to lab.    Follow up with your doctor  Consider talking to them about seeing an medication therapy management pharmacist  [email or web address]

## 2020-09-01 NOTE — PROGRESS NOTES
Progress Note    Patient Name: Randi Cleary  Date: 9/1/2020         Service Type: Individual      Session Start Time: 1:03 PM  Session End Time: 1:55 PM     Session Length: 52 minutes    Session #: 9    Attendees: Client attended alone    Service Modality:  Video Visit:    Telemedicine Visit: The patient's condition can be safely assessed and treated via synchronous audio and visual telemedicine encounter.      Reason for Telemedicine Visit: Services only offered telehealth    Originating Site (Patient Location): Patient's home    Distant Site (Provider Location): Provider Remote Setting    Consent:  The patient/guardian has verbally consented to: the potential risks and benefits of telemedicine (video visit) versus in person care; bill my insurance or make self-payment for services provided; and responsibility for payment of non-covered services.     Mode of Communication:  Video Conference via [employer]    As the provider I attest to compliance with applicable laws and regulations related to telemedicine.      Treatment Plan Last Reviewed: 6/30/2020  PHQ-9 / CHAVO-7 : Unable to assess, patient's stress level and focus on her experinces took priority  PHQ 7/7/2020 7/23/2020 8/12/2020   PHQ-9 Total Score 16 14 18   Q9: Thoughts of better off dead/self-harm past 2 weeks Not at all Not at all Several days   F/U: Thoughts of suicide or self-harm - - No   F/U: Safety concerns - - No     CHAVO-7 SCORE 7/7/2020 7/23/2020 8/12/2020   Total Score 10 (moderate anxiety) - 11 (moderate anxiety)   Total Score 10 13 11         DATA  Interactive Complexity: No  Crisis: No       Progress Since Last Session (Related to Symptoms / Goals / Homework):   Symptoms: Worsening patient is highly overwhelmed with the many challenges she has experienced recently    Homework: Unable to assess, patient's stress level and focus on her experinces took priority      Episode of Care Goals: No improvement -  PREPARATION (Decided to change - considering how); Intervened by negotiating a change plan and determining options / strategies for behavior change, identifying triggers, exploring social supports, and working towards setting a date to begin behavior change     Current / Ongoing Stressors and Concerns:   Patient is currently socially isolated. She has a conflictual relationship with her .  She is getting minimal physical activity due to her pool being closed.      Treatment Objective(s) Addressed in This Session:   Patient will increase frequency of engaging her in ADLs.  Patient will track and record at least 5 pleasant exchanges with . Patient will be able to identify at least 5 positive traits about her .  Patient will reduce level of depressive and anxious features as evidenced by reduction in score on her CHAVO-7 and PHQ-9 (scores of 15 and 16 at first measurment, respectively).     Intervention:   Person-Centered Therapy: Patient was highly frustrated with several small situations that had occurred. Started talking through each of these and processing emotions while provider gave support and validation.     Patient reported some changes with medications that she wanted to go through. Shared she is off of pristiq, buspirone, and metformin as these gave her many side effects including restless legs and challenges with sleep. She went through the experiences she had over the last few days.  Helped patient regulate and then figure out next steps for medication management. She plans to meet with her primary care physician.  If she still has questions she may request to meet with a medication therapy management pharmacist.       ASSESSMENT: Current Emotional / Mental Status (status of significant symptoms):   Risk status (Self / Other harm or suicidal ideation)   Patient denies current fears or concerns for personal safety.   Patient denies current or recent suicidal ideation or  behaviors.   Patient denies current or recent homicidal ideation or behaviors.   Patient denies current or recent self injurious behavior or ideation.   Patient denies other safety concerns.   Patient reports there has been no change in risk factors since their last session.     Patient reports there has been no change in protective factors since their last session.     Recommended that patient call 911 or go to the local ED should there be a change in any of these risk factors.     Appearance:   Appropriate    Eye Contact:   Poor   Psychomotor Behavior: Normal    Attitude:   Cooperative    Orientation:   All   Speech    Rate / Production: Pressured     Volume:  Normal    Mood:    Anxious  Frustrated   Affect:    Constricted    Thought Content:  Rumination    Thought Form:  Obsessive    Insight:    Fair      Medication Review:   No changes to current psychiatric medication(s)     Medication Compliance:   Yes     Changes in Health Issues:   Yes: restless legs, which are causing her distress     Chemical Use Review:   Substance Use: Chemical use reviewed, no active concerns identified      Tobacco Use: No current tobacco use.      Diagnosis:  1. MDD (major depressive disorder), recurrent episode, moderate (H)    2. Anxiety associated with depression    3. CHAVO (generalized anxiety disorder)      Collateral Reports Completed:   Not Applicable    PLAN: (Patient Tasks / Therapist Tasks / Other)  Follow up with your doctor  Consider talking to them about seeing an medication therapy management pharmacist          MICAELA SLADE                                                         ______________________________________________________________________    Treatment Plan    Patient's Name: Randi Cleary  YOB: 1955    Date: 6/30/2020    DSM5 Diagnoses: 296.32 (F33.1) Major Depressive Disorder, Recurrent Episode, Moderate With anxious distress  Psychosocial / Contextual Factors: Patient's entire family  "of origin has , she now has a sister-in-law and  as support.  Relationship with  is conflictual.  Patient is reporting increase in physical symptoms due to COVID-19.  Patient would like to get off of pain medications.  WHODAS: 42    Referral / Collaboration:  Referral to another professional/service is not indicated at this time..    Anticipated number of session or this episode of care: 12-15      MeasurableTreatment Goal(s) related to diagnosis / functional impairment(s)  Goal 1: Patient will \"jumpstart, getting going with the things I need to be doing around the house as far as picking up, doing things, trying to do something every day.  Also to lessen the animosity between me and my .\"    I will know I've met my goal when my shoulders are fixed and I can see.      Objective #A (Patient Action)    Patient will increase frequency of engaging her in ADLs.  Status: New - Date: 2020     Intervention(s)  Therapist will engage patient in CBT, specifically behavioral activation.    Objective #B  Patient will track and record at least 5 pleasant exchanges with . Patient will be able to identify at least 5 positive traits about her . and how he relates to her.  Status: New - Date: 2020     Intervention(s)  Therapist will teach assertiveness skills and assign homework related to relationship interactions.    Objective #C  Patient will reduce level of depressive and anxious features as evidenced by reduction in score on her CHAVO-7 and PHQ-9 (scores of 15 and 16 at first measurment, respectively).  Status: New - Date: 2020     Intervention(s)  Therapist will engage patient in person-centered therapy and CBT.    Patient has reviewed and agreed to the above plan.      MICAELA SLADE  2020  "

## 2020-09-04 ENCOUNTER — COMMUNICATION - HEALTHEAST (OUTPATIENT)
Dept: INTERNAL MEDICINE | Facility: CLINIC | Age: 65
End: 2020-09-04

## 2020-09-04 ENCOUNTER — OFFICE VISIT - HEALTHEAST (OUTPATIENT)
Dept: INTERNAL MEDICINE | Facility: CLINIC | Age: 65
End: 2020-09-04

## 2020-09-04 ENCOUNTER — COMMUNICATION - HEALTHEAST (OUTPATIENT)
Dept: LAB | Facility: CLINIC | Age: 65
End: 2020-09-04

## 2020-09-04 DIAGNOSIS — E83.110 HEREDITARY HEMOCHROMATOSIS (H): ICD-10-CM

## 2020-09-04 DIAGNOSIS — Z09 HOSPITAL DISCHARGE FOLLOW-UP: ICD-10-CM

## 2020-09-04 DIAGNOSIS — Z79.899 HIGH RISK MEDICATION USE: ICD-10-CM

## 2020-09-04 DIAGNOSIS — G89.29 CHRONIC INTRACTABLE PAIN: ICD-10-CM

## 2020-09-04 DIAGNOSIS — I27.20 PULMONARY HYPERTENSION (H): ICD-10-CM

## 2020-09-04 DIAGNOSIS — Z12.11 SCREEN FOR COLON CANCER: ICD-10-CM

## 2020-09-04 DIAGNOSIS — F33.2 SEVERE EPISODE OF RECURRENT MAJOR DEPRESSIVE DISORDER, WITHOUT PSYCHOTIC FEATURES (H): ICD-10-CM

## 2020-09-04 DIAGNOSIS — D35.00 PHEOCHROMOCYTOMA, UNSPECIFIED LATERALITY: ICD-10-CM

## 2020-09-04 DIAGNOSIS — T43.205D: ICD-10-CM

## 2020-09-04 DIAGNOSIS — G25.81 RESTLESS LEG SYNDROME: ICD-10-CM

## 2020-09-04 DIAGNOSIS — R73.01 IMPAIRED FASTING GLUCOSE: ICD-10-CM

## 2020-09-04 DIAGNOSIS — Z12.31 VISIT FOR SCREENING MAMMOGRAM: ICD-10-CM

## 2020-09-04 DIAGNOSIS — E89.0 POSTABLATIVE HYPOTHYROIDISM: ICD-10-CM

## 2020-09-04 DIAGNOSIS — K59.1 FUNCTIONAL DIARRHEA: ICD-10-CM

## 2020-09-04 ASSESSMENT — MIFFLIN-ST. JEOR: SCORE: 1735.64

## 2020-09-08 ENCOUNTER — COMMUNICATION - HEALTHEAST (OUTPATIENT)
Dept: INTERNAL MEDICINE | Facility: CLINIC | Age: 65
End: 2020-09-08

## 2020-09-09 ENCOUNTER — AMBULATORY - HEALTHEAST (OUTPATIENT)
Dept: LAB | Facility: CLINIC | Age: 65
End: 2020-09-09

## 2020-09-09 DIAGNOSIS — G25.81 RESTLESS LEG SYNDROME: ICD-10-CM

## 2020-09-09 DIAGNOSIS — Z79.899 HIGH RISK MEDICATION USE: ICD-10-CM

## 2020-09-09 DIAGNOSIS — D35.00 PHEOCHROMOCYTOMA, UNSPECIFIED LATERALITY: ICD-10-CM

## 2020-09-09 LAB
ALBUMIN SERPL-MCNC: 3.9 G/DL (ref 3.5–5)
ALP SERPL-CCNC: 88 U/L (ref 45–120)
ALT SERPL W P-5'-P-CCNC: 22 U/L (ref 0–45)
ANION GAP SERPL CALCULATED.3IONS-SCNC: 10 MMOL/L (ref 5–18)
AST SERPL W P-5'-P-CCNC: 24 U/L (ref 0–40)
BILIRUB SERPL-MCNC: 0.4 MG/DL (ref 0–1)
BUN SERPL-MCNC: 12 MG/DL (ref 8–22)
CALCIUM SERPL-MCNC: 9.5 MG/DL (ref 8.5–10.5)
CHLORIDE BLD-SCNC: 105 MMOL/L (ref 98–107)
CO2 SERPL-SCNC: 27 MMOL/L (ref 22–31)
CREAT SERPL-MCNC: 0.76 MG/DL (ref 0.6–1.1)
GFR SERPL CREATININE-BSD FRML MDRD: >60 ML/MIN/1.73M2
GLUCOSE BLD-MCNC: 93 MG/DL (ref 70–125)
POTASSIUM BLD-SCNC: 3.3 MMOL/L (ref 3.5–5)
PROT SERPL-MCNC: 7.5 G/DL (ref 6–8)
SODIUM SERPL-SCNC: 142 MMOL/L (ref 136–145)

## 2020-09-10 ENCOUNTER — VIRTUAL VISIT (OUTPATIENT)
Dept: PSYCHOLOGY | Facility: CLINIC | Age: 65
End: 2020-09-10
Payer: MEDICARE

## 2020-09-10 DIAGNOSIS — F33.1 MDD (MAJOR DEPRESSIVE DISORDER), RECURRENT EPISODE, MODERATE (H): Primary | ICD-10-CM

## 2020-09-10 DIAGNOSIS — F41.1 GAD (GENERALIZED ANXIETY DISORDER): ICD-10-CM

## 2020-09-10 DIAGNOSIS — F41.8 ANXIETY ASSOCIATED WITH DEPRESSION: ICD-10-CM

## 2020-09-10 PROCEDURE — 90834 PSYTX W PT 45 MINUTES: CPT | Mod: 95 | Performed by: SOCIAL WORKER

## 2020-09-10 NOTE — PROGRESS NOTES
Progress Note    Patient Name: Randi Cleary  Date: 9/10/2020         Service Type: Individual      Session Start Time: 2:00 PM  Session End Time: 2:50 PM     Session Length: 50 minutes    Session #: 10    Attendees: Client attended alone    Service Modality:  Video Visit:    Telemedicine Visit: The patient's condition can be safely assessed and treated via synchronous audio and visual telemedicine encounter.      Reason for Telemedicine Visit: Services only offered telehealth    Originating Site (Patient Location): Patient's home    Distant Site (Provider Location): Provider Remote Setting    Consent:  The patient/guardian has verbally consented to: the potential risks and benefits of telemedicine (video visit) versus in person care; bill my insurance or make self-payment for services provided; and responsibility for payment of non-covered services.     Mode of Communication:  Video Conference via [employer]    As the provider I attest to compliance with applicable laws and regulations related to telemedicine.      Treatment Plan Last Reviewed: 6/30/2020  PHQ-9 / CHAVO-7 : Unable to assess, patient's stress level and focus on her experinces took priority  PHQ 7/23/2020 8/12/2020 9/10/2020   PHQ-9 Total Score 14 18 7   Q9: Thoughts of better off dead/self-harm past 2 weeks Not at all Several days Not at all   F/U: Thoughts of suicide or self-harm - No -   F/U: Safety concerns - No -     CHAVO-7 SCORE 7/23/2020 8/12/2020 9/10/2020   Total Score - 11 (moderate anxiety) 16 (severe anxiety)   Total Score 13 11 16         DATA  Interactive Complexity: No  Crisis: No       Progress Since Last Session (Related to Symptoms / Goals / Homework):   Symptoms: Patient was in a dysregulated state, but reports that otherwise she has been doing really well    Homework: Achieved / completed to satisfaction   Follow up with your doctor -done  Consider talking to them about seeing an medication  therapy management pharmacist -done     Episode of Care Goals: No improvement - PREPARATION (Decided to change - considering how); Intervened by negotiating a change plan and determining options / strategies for behavior change, identifying triggers, exploring social supports, and working towards setting a date to begin behavior change     Current / Ongoing Stressors and Concerns:   Patient is currently socially isolated. She has a conflictual relationship with her .  She is getting minimal physical activity due to her pool being closed.      Treatment Objective(s) Addressed in This Session:   Patient will increase frequency of engaging her in ADLs.  Patient will track and record at least 5 pleasant exchanges with . Patient will be able to identify at least 5 positive traits about her .  Patient will reduce level of depressive and anxious features as evidenced by reduction in score on her CHAVO-7 and PHQ-9 (scores of 15 and 16 at first measurment, respectively).     Intervention:   Person-Centered Therapy: Patient reported that she was frustrated with her  and his lack of communication. Talked about patterns she has learned in childhood about how to deal with conflict and how she keeps this pattern. Provided support and validation for patient's emotions.    Patient had decided to stop taking her anti-depressants; encouraged her to discuss this with her psychiatrist. Patient then started talking about family history from 40 years prior and how this keeps coming back to her lately.     Talked about coping skills and decided patient would try to use movement this week to feel better.      ASSESSMENT: Current Emotional / Mental Status (status of significant symptoms):   Risk status (Self / Other harm or suicidal ideation)   Patient denies current fears or concerns for personal safety.   Patient denies current or recent suicidal ideation or behaviors.   Patient denies current or recent homicidal  ideation or behaviors.   Patient denies current or recent self injurious behavior or ideation.   Patient denies other safety concerns.   Patient reports there has been no change in risk factors since their last session.     Patient reports there has been no change in protective factors since their last session.     Recommended that patient call 911 or go to the local ED should there be a change in any of these risk factors.     Appearance:   Appropriate    Eye Contact:   Poor   Psychomotor Behavior: Normal    Attitude:   Cooperative    Orientation:   All   Speech    Rate / Production: Pressured     Volume:  Normal    Mood:    Anxious  Frustrated   Affect:    Constricted    Thought Content:  Rumination    Thought Form:  Obsessive    Insight:    Fair      Medication Review:   No changes to current psychiatric medication(s)     Medication Compliance:   Yes     Changes in Health Issues:   None reported     Chemical Use Review:   Substance Use: Chemical use reviewed, no active concerns identified      Tobacco Use: No current tobacco use.      Diagnosis:  1. MDD (major depressive disorder), recurrent episode, moderate (H)    2. Anxiety associated with depression    3. CHAVO (generalized anxiety disorder)      Collateral Reports Completed:   Not Applicable    PLAN: (Patient Tasks / Therapist Tasks / Other)  Find ways to keep up on regular movement.          MICAELA SLADE                                                         ______________________________________________________________________    Treatment Plan    Patient's Name: Randi Cleary  YOB: 1955    Date: 2020    DSM5 Diagnoses: 296.32 (F33.1) Major Depressive Disorder, Recurrent Episode, Moderate With anxious distress  Psychosocial / Contextual Factors: Patient's entire family of origin has , she now has a sister-in-law and  as support.  Relationship with  is conflictual.  Patient is reporting increase in physical  "symptoms due to COVID-19.  Patient would like to get off of pain medications.  WHODAS: 42    Referral / Collaboration:  Referral to another professional/service is not indicated at this time..    Anticipated number of session or this episode of care: 12-15      MeasurableTreatment Goal(s) related to diagnosis / functional impairment(s)  Goal 1: Patient will \"jumpstart, getting going with the things I need to be doing around the house as far as picking up, doing things, trying to do something every day.  Also to lessen the animosity between me and my .\"    I will know I've met my goal when my shoulders are fixed and I can see.      Objective #A (Patient Action)    Patient will increase frequency of engaging her in ADLs.  Status: New - Date: 6/30/2020     Intervention(s)  Therapist will engage patient in CBT, specifically behavioral activation.    Objective #B  Patient will track and record at least 5 pleasant exchanges with . Patient will be able to identify at least 5 positive traits about her . and how he relates to her.  Status: New - Date: 6/30/2020     Intervention(s)  Therapist will teach assertiveness skills and assign homework related to relationship interactions.    Objective #C  Patient will reduce level of depressive and anxious features as evidenced by reduction in score on her CHAVO-7 and PHQ-9 (scores of 15 and 16 at first measurment, respectively).  Status: New - Date: 6/30/2020     Intervention(s)  Therapist will engage patient in person-centered therapy and CBT.    Patient has reviewed and agreed to the above plan.      MICAELA SLADE  June 30, 2020  "

## 2020-09-11 ENCOUNTER — COMMUNICATION - HEALTHEAST (OUTPATIENT)
Dept: PHARMACY | Facility: CLINIC | Age: 65
End: 2020-09-11

## 2020-09-12 LAB
ANNOTATION COMMENT IMP: ABNORMAL
METANEPHS SERPL-SCNC: 0.11 NMOL/L (ref 0–0.49)
NORMETANEPHRINE SERPL-SCNC: 0.9 NMOL/L (ref 0–0.89)

## 2020-09-15 ENCOUNTER — COMMUNICATION - HEALTHEAST (OUTPATIENT)
Dept: INTERNAL MEDICINE | Facility: CLINIC | Age: 65
End: 2020-09-15

## 2020-09-15 LAB
EPINEPH PLAS-MCNC: <25 PG/ML
Lab: 528 PG/ML
Lab: <25 PG/ML

## 2020-09-16 ENCOUNTER — VIRTUAL VISIT (OUTPATIENT)
Dept: PSYCHOLOGY | Facility: CLINIC | Age: 65
End: 2020-09-16
Payer: MEDICARE

## 2020-09-16 DIAGNOSIS — F41.8 ANXIETY ASSOCIATED WITH DEPRESSION: ICD-10-CM

## 2020-09-16 DIAGNOSIS — F33.1 MDD (MAJOR DEPRESSIVE DISORDER), RECURRENT EPISODE, MODERATE (H): Primary | ICD-10-CM

## 2020-09-16 DIAGNOSIS — F41.1 GAD (GENERALIZED ANXIETY DISORDER): ICD-10-CM

## 2020-09-16 PROCEDURE — 90834 PSYTX W PT 45 MINUTES: CPT | Mod: 95 | Performed by: SOCIAL WORKER

## 2020-09-16 NOTE — PATIENT INSTRUCTIONS
"Add swimming to your routine  Consider meditation to help manage mood  Get out on porch    ______________________________________________________________________    Treatment Plan    Patient's Name: Randi Cleary  YOB: 1955    Date: 2020    DSM5 Diagnoses: 296.32 (F33.1) Major Depressive Disorder, Recurrent Episode, Moderate With anxious distress  Psychosocial / Contextual Factors: Patient's entire family of origin has , she now has a sister-in-law and  as support.  Relationship with  is conflictual.  Patient is reporting increase in physical symptoms due to COVID-19.  Patient would like to get off of pain medications.  WHODAS: 42    Referral / Collaboration:  Referral to another professional/service is not indicated at this time.    Anticipated number of session or this episode of care: 12-15      MeasurableTreatment Goal(s) related to diagnosis / functional impairment(s)  Goal 1: Patient will \"jumpstart, getting going with the things I need to be doing around the house as far as picking up, doing things, trying to do something every day.  Also to lessen the animosity between me and my .\"    I will know I've met my goal when my shoulders are fixed and I can see.      Objective #A (Patient Action)    Patient will increase frequency of engaging her in ADLs.  Status: Continued - Date(s): 20    Intervention(s)  Therapist will engage patient in CBT, specifically behavioral activation.    Objective #B  Patient will track and record at least 5 pleasant exchanges with . Patient will be able to identify at least 5 positive traits about her . and how he relates to her.  Status: Continued - Date(s): 20    Intervention(s)  Therapist will teach assertiveness skills and assign homework related to relationship interactions.    Objective #C  Patient will reduce level of depressive and anxious features as evidenced by reduction in score on her CHAVO-7 and PHQ-9 " (scores of 15 and 16 at first measurment, respectively).  Status: Continued - Date(s): 9/16/20    Intervention(s)  Therapist will engage patient in person-centered therapy and CBT.

## 2020-09-16 NOTE — PROGRESS NOTES
Progress Note    Patient Name: Randi Cleary  Date: 9/16/2020         Service Type: Individual      Session Start Time: 1:02 PM  Session End Time: 1:52 PM     Session Length: 50 minutes    Session #: 11    Attendees: Client attended alone    Service Modality:  Video Visit:    Telemedicine Visit: The patient's condition can be safely assessed and treated via synchronous audio and visual telemedicine encounter.      Reason for Telemedicine Visit: Services only offered telehealth    Originating Site (Patient Location): Patient's home    Distant Site (Provider Location): Provider Remote Setting    Consent:  The patient/guardian has verbally consented to: the potential risks and benefits of telemedicine (video visit) versus in person care; bill my insurance or make self-payment for services provided; and responsibility for payment of non-covered services.     Mode of Communication:  Video Conference via [employer]    As the provider I attest to compliance with applicable laws and regulations related to telemedicine.      Treatment Plan Last Reviewed: today (9/16/20)  PHQ-9 / CHAVO-7 : Unable to assess, patient's stress level and focus on her experinces took priority  PHQ 7/23/2020 8/12/2020 9/10/2020   PHQ-9 Total Score 14 18 7   Q9: Thoughts of better off dead/self-harm past 2 weeks Not at all Several days Not at all   F/U: Thoughts of suicide or self-harm - No -   F/U: Safety concerns - No -     CHAVO-7 SCORE 7/23/2020 8/12/2020 9/10/2020   Total Score - 11 (moderate anxiety) 16 (severe anxiety)   Total Score 13 11 16         DATA  Interactive Complexity: No  Crisis: No       Progress Since Last Session (Related to Symptoms / Goals / Homework):   Symptoms: Patient reports an increase in feeling teary and angry    Homework: Achieved / completed to satisfaction  Find ways to keep up on regular movement. -done     Episode of Care Goals: Minimal progress - PREPARATION (Decided to  change - considering how); Intervened by negotiating a change plan and determining options / strategies for behavior change, identifying triggers, exploring social supports, and working towards setting a date to begin behavior change     Current / Ongoing Stressors and Concerns:   Patient is currently socially isolated. She has a conflictual relationship with her .  She is getting minimal physical activity due to her pool being closed.      Treatment Objective(s) Addressed in This Session:   Patient will increase frequency of engaging her in ADLs.  Patient will track and record at least 5 pleasant exchanges with . Patient will be able to identify at least 5 positive traits about her .  Patient will reduce level of depressive and anxious features as evidenced by reduction in score on her CHAVO-7 and PHQ-9 (scores of 15 and 16 at first measurment, respectively).     Intervention:   Person-Centered Therapy: Patient shared updates of having seen all of her doctors. Talked about some of her frustrations with medications and the way she is navigating it with the doctors, gave her positive verbal feedback for advocating for her needs.  Reviewed and updated treatment plan.  Patient talked about some frustrations with her , helped her identify possible solutions.  Also looked at homework and successes with this. Identified that she has found ways to continue to get movement, which provider reinforced would be good for her mental health. Also looked at other ways to support her mental health, including getting outside and using mindfulness.        ASSESSMENT: Current Emotional / Mental Status (status of significant symptoms):   Risk status (Self / Other harm or suicidal ideation)   Patient denies current fears or concerns for personal safety.   Patient denies current or recent suicidal ideation or behaviors.   Patient denies current or recent homicidal ideation or behaviors.   Patient denies current  or recent self injurious behavior or ideation.   Patient denies other safety concerns.   Patient reports there has been no change in risk factors since their last session.     Patient reports there has been no change in protective factors since their last session.     Recommended that patient call 911 or go to the local ED should there be a change in any of these risk factors.     Appearance:   Appropriate    Eye Contact:   Poor   Psychomotor Behavior: Normal    Attitude:   Cooperative    Orientation:   All   Speech    Rate / Production: Pressured     Volume:  Normal    Mood:    Anxious  Frustrated   Affect:    Worrisome    Thought Content:  Rumination    Thought Form:  Obsessive    Insight:    Fair      Medication Review:   No changes to current psychiatric medication(s)     Medication Compliance:   Yes     Changes in Health Issues:   None reported     Chemical Use Review:   Substance Use: Chemical use reviewed, no active concerns identified      Tobacco Use: No current tobacco use.      Diagnosis:  1. MDD (major depressive disorder), recurrent episode, moderate (H)    2. Anxiety associated with depression    3. CHAVO (generalized anxiety disorder)      Collateral Reports Completed:   Not Applicable    PLAN: (Patient Tasks / Therapist Tasks / Other)  Add swimming to your routine  Consider meditation to help manage mood  Get out on MICAELA Michelle                                                         ______________________________________________________________________    Treatment Plan    Patient's Name: Randi Cleary  YOB: 1955    Date: 2020    DSM5 Diagnoses: 296.32 (F33.1) Major Depressive Disorder, Recurrent Episode, Moderate With anxious distress  Psychosocial / Contextual Factors: Patient's entire family of origin has , she now has a sister-in-law and  as support.  Relationship with  is conflictual.  Patient is reporting increase in physical  "symptoms due to COVID-19.  Patient would like to get off of pain medications.  WHODAS: 42    Referral / Collaboration:  Referral to another professional/service is not indicated at this time.    Anticipated number of session or this episode of care: 12-15      MeasurableTreatment Goal(s) related to diagnosis / functional impairment(s)  Goal 1: Patient will \"jumpstart, getting going with the things I need to be doing around the house as far as picking up, doing things, trying to do something every day.  Also to lessen the animosity between me and my .\"    I will know I've met my goal when my shoulders are fixed and I can see.      Objective #A (Patient Action)    Patient will increase frequency of engaging her in ADLs.  Status: Continued - Date(s): 9/16/20    Intervention(s)  Therapist will engage patient in CBT, specifically behavioral activation.    Objective #B  Patient will track and record at least 5 pleasant exchanges with . Patient will be able to identify at least 5 positive traits about her . and how he relates to her.  Status: Continued - Date(s): 9/16/20    Intervention(s)  Therapist will teach assertiveness skills and assign homework related to relationship interactions.    Objective #C  Patient will reduce level of depressive and anxious features as evidenced by reduction in score on her CHAVO-7 and PHQ-9 (scores of 15 and 16 at first measurment, respectively).  Status: Continued - Date(s): 9/16/20    Intervention(s)  Therapist will engage patient in person-centered therapy and CBT.    Patient has reviewed and agreed to the above plan.      MICAELA SLADE  September 16, 2020  "

## 2020-09-17 RX ORDER — DIAZEPAM 2 MG
TABLET ORAL EVERY 6 HOURS PRN
COMMUNITY
End: 2021-01-07

## 2020-09-17 NOTE — PROGRESS NOTES
Endocrine video visit progress note    Attending Assessment/Plan :     Increased metanephrine noted 9/2020 labs . Differential includes pheo recurrence, untreated sleep apnea, other  Repeat blood test and 24 hour urine after on CPAP one week     Review of care everywhere 11/6/2020 for results that did not flow to me, in response to patient notice  10/21/2020 labs aldosterone 16, metanephrine 0.11, normetanephrine 0.49, renin activity 1.8  11/2/20202 24 hour urine metanephrine 46 mg/day (), normetanephrine  186 mcg/day (), volume 2700, urine creatinine 837 mg/day    History of left sided pheochromocytoma, 2.8 cm, removed 8/17.  This has been presumed sporadic but it might not be.      History of unilateral adrenalectomy; history of corticosteroid injection 11/16/17, history of prednisone burst to 40 mg/day on 10/16/19.      Sleep apnea , prescribed  CPAP, not using it.  Discussed again, strongly reinforced the need.      Pre/diabetes by HgbA1c.  She had diarrhea on metformin  mg/day.  She is off metformin with A1c 6.2%. 7/2020 .  I have counseled her on lifestyle.  I have again recommended CPAP which might help her weight and secondarily this issue.      Hypothyroidism following radioiodine treatment for  Graves' .  Treat to normal target TSH LT4  175 mcg/day. .  Normal TFTs 7/2020    Hypokalemia questions. She is on K.  Repeat with next labs including aldosterone and renin    Obesity.   She might be a good candidate for the weight management clinic.      On opiates - transdermal buprenorphine.  Opiates can down regulate the HPA    Hemocrhomatosis can affect endocrine organ function.  I am listing this so I don't forget about it.     Start time  0833 amwel -  Stop time 0910  Total time  37 minutes  Greater than 50% of 37 minute appt on counseling and coordination of care.     Alee Coker MD    Chief complaint/ HISTORY OF PRESENT ILLNESS Winnie returns for follow up of hypothyroidism following  "131I for Graves', history of left pheochromocytoma with unilateral adrenalectomy, prediabetes.  I have seen her 2/2020. .  On 2/12/2020 TSH was 6.04, free T4 0.9 At that time we increased the LT4 from 162 mcg/day (175 x 6.5/week, divided) to 175 mcg/day and we started metformin  mg/day with progressive up titration. Today she reports she developed diarrhea on metformin 500 mg/day, causing her to stop it a few weeks ago.     She has history of Graves' treated with 131I x 2 many years ago.  She has been on LT4 since then.    Adrenal mass was lst noted in 2006.  At that time it was 1.5 cm.  On follow up imaging in 2017 it was 2.5 cm.  She underwent laparoscopic left adrenalectomy in 8/2/17 ([facility]).  Surgical pathology was read as pheochromocytoma 2.8x 2 x 2.2, confined to the left adrenal.  she recalled how this caused rapid onset rage , sweating and her BP wasn't that high.      She has known sleep apnea :The sleep medicine notes suggest she is not meeting jective benchmarks for compliance.       I have reviewed Care Everywhere including [facility], both where she has had labs since our last visit.   She was hospitalized [facility]  8/28-8/31/2020 with diagnosis serotonin syndrome . The symptom onset followed increase in desvenlafaxine from 50 to 100 mgd/day and increase in buspar from 15 to 30 mg/day.  She had already self reduced the desvenlafaxine dose.  She was treated with diazepam prn.  Today she recalls that the insomnia was getting progressively worse so she was only sleeping 3-4 hours/night at the most ; she was up;  pristique dose increased from 50 to 100 . She was on the 100 mg/day dose about 1.5 month.  \"I was up all night, pacing, restless body, anxiety, sweating, clammy, then hot, palpitatations, nausea\"  Since then, multiple drugs have been discontinued.     We have the following relevant labs  4/19/17 urine cortisol 17 mcg/24 hours (3.5-45); metanephrine 719 mcg/24 " hours (normal < 400), normetanephrine 409 mcg/24 hours (< 900), total metanephrine 1128 mcg/24 hours (< 1300), NE 47 mcg/24 hours (15-80), EPI 18 mcg/24 hoiurs (< 21),  ( mcg/24 hours), 24 hour urine volume 2025 12/19/17: TSH 3.19  8/9/19 TSH 5.35, 25OHD 35.8  8/13/19: TSH 9.15, free T4 0.87, cholesterol 221, , HDL 78, LDL 99  10/3/19 glucose 110, HgbA1c 6%, K 3.2,   10/14/19 TSH 2.74, 25OHD 66, b12 > 2000, folate 10.8  11/18/19: TSH 7.54, free T4 0.86  12/4/19 free T4 1.16, normetanephrine 0.89, metanephrine 0.12  2/12/2020: TSH 6.04, free T4 0.9, HgbA1c 7.2%, Ca 9.2, creatinine 0.71, glucose 120  2/20/2020 metanephrine 0.12, normetanephrine 0.8  7/17/2020 TSH 0.99, free T4 1.0, HgbA1c 6.2%  8/3/2020 Fe 51, transferrin saturation 16, 25OHD 68.2, B12 755  9/9/2020 metanephrine 0.11, normetaenprhine 0.9 ,  pg/ml (700-750 supine) , epi < 25, DA < 25 Na 142, K 3.3, Glucoe 93, CA 9.5, LFS2 normal.       Imaging  5/15/1994 15 mCi 131I; prior 123I thyroid uptake scan: 69.1% 24 ilir ruprake; enlarged gland with prominent pyramidal lobe  12/7/1994: 123I thyroid uptake 52%  3/9/17 US thyroid ([facility])  6/16/17 MR abdomen ([facility]): 2.5 cm left adrenal nodule (was 1.5 11/9/06)  6/24/19 CTA chest: evidence for pulmonary artery HTN, hepatic steattosis;     REVIEW OF SYSTEMS  I never want to go anything like that again.  Weight gain.  Weight is currently 255# .  Was 259; trying to lose weight   Sleeping so much better now; was having progressive insomnia prior to hospitalizaiton; sleeping in recliner due to shoulder pain;   Cardiac:  Currently not having heart palpitiation; was having heart palpitations before; BP   Respiratory: Cough attributed to COPD; always have cough; not using CPAP  - trying to get going with it. The mask caused air to flow into her eyes   GI: Went off the metformin -- got up to 500 mg/day; she was having liquid diarrhea on this dose.  She discontinued metformin 2 weeks ago  and stools are now almost normal.    Asked for pheo test because I am feeling that aggression like when I had the pheo before. Cannabis is helping; never felt right after the adrenalectomy  Having problems tyring to stay human; trying to get normal things done.   Restless legs for  Years -- Not having restless legs now and not having the syndrome symptoms  Having shoulder  surgery 10/6 -   Neuropathy, tingling and numbness in the feet for quite a while.     Past Medical History  Past Medical History:   Diagnosis Date     Anxiety      Bipolar disorder (H)      Breast cancer (H) 1986    lumpectomy, radiation, chemo     COPD (chronic obstructive pulmonary disease) (H)     asthma     Depression, major, recurrent (H)      Drug tolerance     opioid     Esophageal reflux      Fatigue      Graves disease 1994     Hemochromatosis 02/14/2018    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; H63D not detected     Hyperlipidaemia      Hypertension      Impaired fasting glucose 2017     Joint pain      Morbid obesity (H)      KYAW (obstructive sleep apnea) 2016     Osteopenia      Pheochromocytoma, left 08/02/2017    laparoscopically removed     Postablative hypothyroidism 1995     Psoriasis      Psoriatic arthropathy (H)      RLS (restless legs syndrome)      Sacroiliitis (H)      Snoring      Spinal stenosis      Vitamin B 12 deficiency 2009     Vitamin D deficiency 2010     Past Surgical History:   Procedure Laterality Date     ARTHRODESIS ANKLE       ARTHROPLASTY ANKLE Right 6/29/2015    Procedure: ARTHROPLASTY ANKLE;  Surgeon: Jason Coughlin MD;  Location: [facility]     ARTHROPLASTY REVISION ANKLE Right 6/29/2015    Procedure: ARTHROPLASTY REVISION ANKLE;  Surgeon: Jason Coughlin MD;  Location: [facility]     BREAST BIOPSY, CORE RT/LT       COLONOSCOPY       CV CORONARY ANGIOGRAM N/A 10/3/2019    Procedure: CV CORONARY ANGIOGRAM;  Surgeon: Bryce Pierre MD;  Location:  [facility]ARD[facility]ATH LAB     CV RIGHT HEART CATH N/A 10/3/2019     Procedure: CV RIGHT HEART CATH;  Surgeon: Bryce Pierre MD;  Location:  HEART CARDIAC CATH LAB     LAPAROSCOPIC ADRENALECTOMY Left 08/02/2017    pheochromocytoma     LENGTHEN TENDON ACHILLES Right 6/29/2015    Procedure: LENGTHEN TENDON ACHILLES;  Surgeon: Jason Coughlin MD;  Location: [facility]     LUMPECTOMY BREAST       MAMMOPLASTY REDUCTION Right 01/13/2015    [place]     MASTECTOMY MODIFIED RADICAL       REPAIR HAMMER TOE Right 6/29/2015    Procedure: REPAIR HAMMER TOE;  Surgeon: Jason Coughlin MD;  Location: [facility]     TONSILLECTOMY & ADENOIDECTOMY       Breast lumpectomy 1986  Breast lumpectomy 1994  Reconstruction on right breast to make more symmmetrical to left     Medications    Current Outpatient Medications   Medication Sig Dispense Refill     albuterol (ACCUNEB) 0.63 MG/3ML neb solution Take 1 ampule by nebulization every 6 hours as needed for shortness of breath / dyspnea or wheezing       ASPIRIN PO Take 81 mg by mouth 2 times daily        benzonatate (TESSALON) 100 MG capsule TAKE 1 CAPSULE BY MOUTH THREE TIMES A DAY AS NEEDED FOR COUGH       BUTRANS 10 MCG/HR WK patch PLACE 1 PATCH ON THE SKIN EVERY 7 DAYS.  2     Cholecalciferol (VITAMIN D3) 250 MCG (33017 UT) TABS Take 1 tablet by mouth daily 94031/day       Cyanocobalamin (VITAMIN B-12) 5000 MCG SUBL Unknown dose. 2 or 3 sprays/day       diazepam (VALIUM) 2 MG tablet Take by mouth every 6 hours as needed for anxiety       diclofenac (VOLTAREN) 1 % topical gel Place onto the skin 4 times daily       furosemide (LASIX) 20 MG tablet Take 2 tablets (40 mg) by mouth daily 180 tablet 3     levothyroxine (SYNTHROID/LEVOTHROID) 175 MCG tablet Take 1 tablet (175 mcg) by mouth daily 90 tablet 4     losartan (COZAAR) 25 MG tablet Take 1 tablet (25 mg) by mouth daily 90 tablet 3     Omeprazole (PRILOSEC PO) Take 20 mg by mouth 2 times daily (before meals)       potassium chloride ER (KLOR-CON M) 20 MEQ CR tablet Take 1 tablet (20 mEq) by  "mouth daily 90 tablet 3     busPIRone (BUSPAR) 15 MG tablet Take 15 mg by mouth 3 times daily       Desvenlafaxine Succinate (PRISTIQ PO) Take 100 mg by mouth daily        hydrOXYzine (VISTARIL) 25 MG capsule 25 mg 3 times daily as needed   0     metFORMIN 500 MG PO 24 hr tablet Take 1 tablet by mouth with dinner  X 2 weeks , then 2 tablets X 30 days and if tolerated increase to  3 tablets daily 180 tablet 3     Pramipexole Dihydrochloride (MIRAPEX PO) Take 1 mg by mouth 2 times daily        Valium 2 mg - makes me aggressive, ornery, angry - takes 1 mg this AM  cannabis  She is now off of buspar, pristique, gabapentin    Allergies  Allergies   Allergen Reactions     Gabapentin      Drove on the wrong side of the highway     Levofloxacin      \"CAN'T REMEMBER\"     Penicillins      \"SORES IN MOUTH\"     Sulfa Drugs      \"PT DOES NOT KNOW WHAT THE REACTION WAS\"        Social History  Social History     Tobacco Use     Smoking status: Former Smoker     Packs/day: 2.50     Years: 20.00     Pack years: 50.00     Types: Cigarettes     Last attempt to quit: 2000     Years since quittin.2     Smokeless tobacco: Never Used   Substance Use Topics     Alcohol use: No     Comment: relapse 2019 sober      Drug use: No     Plans to start going to [employer];  -  is ;     Physical Exam  GENERAL :talking, in NAD; she is sitting in a dark room  SKIN: Visible skin clear.  EYES: Eyes grossly normal to inspection.    RESP: No audible wheeze, cough, or visible cyanosis.  No visible retractions or increased work of breathing.  coughing dry;  NEURO:  Awake, alert, responds appropriately to questions.  Mentation and speech fluent.  PSYCH:affect normal, judgement and insight intact, and appearance well-groomed.    DATA REVIEW  See HPI    Component Name Value Ref Range   Hours Collected 24 hr   Total Volume 2,700 mL   Creatinine, Urine - per volume 31 mg/dL   Creatinine, Urine - per 24h 837 "   Comment:  Performed by [facility],  [address] [place] [address] [phone]  [email or web address], Ofe Bruce MD, Lab. Director 500 - 1400 mg/d   Metanephrines, Urine Interpretation See Note   Comment:  TEST INFORMATION: Metanephrines Fractionated, Urine    The optimal specimen for this testing is a 24-hour urine   collection. Per-day calculations are not reported for patients   younger than 7 years of age and for the following specimen types:   a random collection, a collection with duration of less than 20   hours, a collection with duration of greater than 28 hours, or a   collection with total volume less than 400 mL (if 18 years of age   or older) or greater than 5000 mL (all ages). Ratios to creatinine   may be useful for these evaluations.    Smaller increases in metanephrine and/or normetanephrine   concentrations (less than two times the upper reference limit)   usually are the result of physiological stimuli, drugs, or   improper specimen collection. Essential hypertension is often   associated with slight elevations (metanephrine less than 400 ug/d   and normetanephrine less than 900 ug/d). Elevated concentrations   may be due to intense physical activity, life-threatening illness,   and drug interferences.     Significant elevation of one or both metanephrines (three or more   times the upper reference limit) is associated with an increased   probability of a neuroendocrine tumor.    Access complete set of age- and/or gender-specific reference   intervals for this test in the [facility] Directory   ([email or web address]).    See Compliance statement B: [email or web address]     Metanephrine, Urine - per volume 17 ug/L   Normetanephrine, Urine - per volume 69 ug/L   Metanephrine, Urine - per 24h 46 36 - 229 ug/d   Metanephrine, Urine - ratio to CRT 55 0 - 300 ug/g CRT   Normetanephrine, Urine - per 24h 186 95 - 650 ug/d   Normetanephrine, Urine - ratio to  0 - 400 ug/g CRT   Specimen  Collected on   Urine 11/2/2020 12:00 PM

## 2020-09-17 NOTE — PROGRESS NOTES
"dm 2  Eder Nichols, [facility]    Patients Glucose Data was Patient stated they are not currently checking their glucose     Randi Cleary is a 65 year old female who is being evaluated via a billable video visit.      The patient has been notified of following:     \"This video visit will be conducted via a call between you and your physician/provider. We have found that certain health care needs can be provided without the need for an in-person physical exam.  This service lets us provide the care you need with a video conversation.  If a prescription is necessary we can send it directly to your pharmacy.  If lab work is needed we can place an order for that and you can then stop by our lab to have the test done at a later time.    Video visits are billed at different rates depending on your insurance coverage.  Please reach out to your insurance provider with any questions.    If during the course of the call the physician/provider feels a video visit is not appropriate, you will not be charged for this service.\"    Patient has given verbal consent for Video visit? Yes  How would you like to obtain your AVS? MyChart  If you are dropped from the video visit, the video invite should be resent to: Send to e-mail at: [email or web address]  Will anyone else be joining your video visit? Yes: . How would they like to receive their invitation? Send to e-mail at: [email or web address]        Video-Visit Details    Type of service:  Video Visit    Distant Location (provider location):  [facility] ENDOCRINOLOGY     Platform used for Video Visit: Tara"

## 2020-09-18 ENCOUNTER — VIRTUAL VISIT (OUTPATIENT)
Dept: ENDOCRINOLOGY | Facility: CLINIC | Age: 65
End: 2020-09-18
Payer: MEDICARE

## 2020-09-18 ENCOUNTER — RECORDS - HEALTHEAST (OUTPATIENT)
Dept: ADMINISTRATIVE | Facility: OTHER | Age: 65
End: 2020-09-18

## 2020-09-18 ENCOUNTER — HOSPITAL ENCOUNTER (OUTPATIENT)
Dept: PALLIATIVE MEDICINE | Facility: OTHER | Age: 65
Discharge: HOME | End: 2020-09-18
Attending: ANESTHESIOLOGY

## 2020-09-18 DIAGNOSIS — G89.4 CHRONIC PAIN SYNDROME: ICD-10-CM

## 2020-09-18 DIAGNOSIS — R73.03 PREDIABETES: ICD-10-CM

## 2020-09-18 DIAGNOSIS — E89.0 POSTABLATIVE HYPOTHYROIDISM: ICD-10-CM

## 2020-09-18 DIAGNOSIS — E87.6 HYPOKALEMIA: ICD-10-CM

## 2020-09-18 DIAGNOSIS — G47.33 OSA (OBSTRUCTIVE SLEEP APNEA): ICD-10-CM

## 2020-09-18 DIAGNOSIS — Z86.018 HISTORY OF PHEOCHROMOCYTOMA: Primary | ICD-10-CM

## 2020-09-18 DIAGNOSIS — M25.551 PAIN IN JOINT INVOLVING RIGHT PELVIC REGION AND THIGH: ICD-10-CM

## 2020-09-18 DIAGNOSIS — E89.6 HISTORY OF PARTIAL ADRENALECTOMY (H): ICD-10-CM

## 2020-09-18 DIAGNOSIS — R79.89 ELEVATED PLASMA METANEPHRINES: ICD-10-CM

## 2020-09-18 ASSESSMENT — MIFFLIN-ST. JEOR: SCORE: 1722.03

## 2020-09-18 NOTE — PATIENT INSTRUCTIONS
Repeat the blood test after using CPAP one week  24 hour urine test after using CPAP one week      Work on treating the sleep apnea
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
## 2020-09-21 ENCOUNTER — COMMUNICATION - HEALTHEAST (OUTPATIENT)
Dept: PALLIATIVE MEDICINE | Facility: OTHER | Age: 65
End: 2020-09-21

## 2020-09-21 ENCOUNTER — TELEPHONE (OUTPATIENT)
Dept: CARDIOLOGY | Facility: CLINIC | Age: 65
End: 2020-09-21

## 2020-09-21 ENCOUNTER — RECORDS - HEALTHEAST (OUTPATIENT)
Dept: ADMINISTRATIVE | Facility: OTHER | Age: 65
End: 2020-09-21

## 2020-09-21 ENCOUNTER — VIRTUAL VISIT (OUTPATIENT)
Dept: PULMONOLOGY | Facility: CLINIC | Age: 65
End: 2020-09-21
Attending: INTERNAL MEDICINE
Payer: MEDICARE

## 2020-09-21 DIAGNOSIS — J41.0 SIMPLE CHRONIC BRONCHITIS (H): Primary | ICD-10-CM

## 2020-09-21 DIAGNOSIS — E66.813 CLASS 3 SEVERE OBESITY WITH SERIOUS COMORBIDITY AND BODY MASS INDEX (BMI) OF 40.0 TO 44.9 IN ADULT, UNSPECIFIED OBESITY TYPE (H): ICD-10-CM

## 2020-09-21 DIAGNOSIS — G47.33 OSA (OBSTRUCTIVE SLEEP APNEA): ICD-10-CM

## 2020-09-21 DIAGNOSIS — M25.551 PAIN IN JOINT INVOLVING RIGHT PELVIC REGION AND THIGH: ICD-10-CM

## 2020-09-21 DIAGNOSIS — E66.01 CLASS 3 SEVERE OBESITY WITH SERIOUS COMORBIDITY AND BODY MASS INDEX (BMI) OF 40.0 TO 44.9 IN ADULT, UNSPECIFIED OBESITY TYPE (H): ICD-10-CM

## 2020-09-21 RX ORDER — ALBUTEROL SULFATE 90 UG/1
2 AEROSOL, METERED RESPIRATORY (INHALATION) EVERY 4 HOURS PRN
Qty: 1 INHALER | Refills: 11 | Status: SHIPPED | OUTPATIENT
Start: 2020-09-21 | End: 2021-08-24
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
## 2020-09-21 NOTE — TELEPHONE ENCOUNTER
Advised that Dr. Edwards wanted patient to see PCP to manage diuretics/potassium and lab follow up. Asked that patient call Dr. Stockton's office to schedule an appt to assess fluid status and refill prescriptions. Stated that I could fax over the OVN if the PCP needed it. Patient verbalized understanding and did not have any other questions. Bindu Walden RN on 9/21/2020 at 2:41 PM    OVN faxed over to Dr. Stockton's office at (f) [phone] per patient's request.Asked that the patient call her PCP later this week. Patient verbalized understanding and did not have any further questions. Bindu Walden RN on 9/22/2020 at 1:09 PM

## 2020-09-22 ENCOUNTER — TELEPHONE (OUTPATIENT)
Dept: CARDIOLOGY | Facility: CLINIC | Age: 65
End: 2020-09-22

## 2020-09-23 ENCOUNTER — VIRTUAL VISIT (OUTPATIENT)
Dept: PSYCHOLOGY | Facility: CLINIC | Age: 65
End: 2020-09-23
Payer: MEDICARE

## 2020-09-23 DIAGNOSIS — F33.1 MDD (MAJOR DEPRESSIVE DISORDER), RECURRENT EPISODE, MODERATE (H): Primary | ICD-10-CM

## 2020-09-23 DIAGNOSIS — F41.8 ANXIETY ASSOCIATED WITH DEPRESSION: ICD-10-CM

## 2020-09-23 DIAGNOSIS — F41.1 GAD (GENERALIZED ANXIETY DISORDER): ICD-10-CM

## 2020-09-23 PROCEDURE — 90834 PSYTX W PT 45 MINUTES: CPT | Mod: 95 | Performed by: SOCIAL WORKER

## 2020-09-23 ASSESSMENT — PATIENT HEALTH QUESTIONNAIRE - PHQ9: SUM OF ALL RESPONSES TO PHQ QUESTIONS 1-9: 17

## 2020-09-23 ASSESSMENT — ANXIETY QUESTIONNAIRES
3. WORRYING TOO MUCH ABOUT DIFFERENT THINGS: NEARLY EVERY DAY
GAD7 TOTAL SCORE: 19
2. NOT BEING ABLE TO STOP OR CONTROL WORRYING: NEARLY EVERY DAY
1. FEELING NERVOUS, ANXIOUS, OR ON EDGE: NEARLY EVERY DAY
4. TROUBLE RELAXING: MORE THAN HALF THE DAYS
5. BEING SO RESTLESS THAT IT IS HARD TO SIT STILL: MORE THAN HALF THE DAYS
7. FEELING AFRAID AS IF SOMETHING AWFUL MIGHT HAPPEN: NEARLY EVERY DAY
6. BECOMING EASILY ANNOYED OR IRRITABLE: NEARLY EVERY DAY

## 2020-09-23 NOTE — PROGRESS NOTES
Progress Note    Patient Name: Randi Cleary  Date: 9/23/2020         Service Type: Individual      Session Start Time: 11:02 AM  Session End Time: 11:47 AM     Session Length: 45 minutes    Session #: 12    Attendees: Client attended alone    Service Modality:  Video Visit:    Telemedicine Visit: The patient's condition can be safely assessed and treated via synchronous audio and visual telemedicine encounter.      Reason for Telemedicine Visit: Services only offered telehealth    Originating Site (Patient Location): Patient's home    Distant Site (Provider Location): Provider Remote Setting    Consent:  The patient/guardian has verbally consented to: the potential risks and benefits of telemedicine (video visit) versus in person care; bill my insurance or make self-payment for services provided; and responsibility for payment of non-covered services.     Mode of Communication:  Video Conference via [employer]    As the provider I attest to compliance with applicable laws and regulations related to telemedicine.      Treatment Plan Last Reviewed: 9/16/20  PHQ-9 / CHAVO-7 :   PHQ 8/12/2020 9/10/2020 9/23/2020   PHQ-9 Total Score 18 7 17   Q9: Thoughts of better off dead/self-harm past 2 weeks Several days Not at all Several days   F/U: Thoughts of suicide or self-harm No - -   F/U: Safety concerns No - -     CHAVO-7 SCORE 8/12/2020 9/10/2020 9/23/2020   Total Score 11 (moderate anxiety) 16 (severe anxiety) -   Total Score 11 16 19         DATA  Interactive Complexity: No  Crisis: No       Progress Since Last Session (Related to Symptoms / Goals / Homework):   Symptoms: Patient continues to feel teary and angry    Homework: Partially completed       Episode of Care Goals: Minimal progress - PREPARATION (Decided to change - considering how); Intervened by negotiating a change plan and determining options / strategies for behavior change, identifying triggers, exploring social  supports, and working towards setting a date to begin behavior change     Current / Ongoing Stressors and Concerns:   Patient is currently socially isolated. She has a conflictual relationship with her .  She is getting minimal physical activity due to her pool being closed.      Treatment Objective(s) Addressed in This Session:   Patient will increase frequency of engaging her in ADLs.  Patient will track and record at least 5 pleasant exchanges with . Patient will be able to identify at least 5 positive traits about her .  Patient will reduce level of depressive and anxious features as evidenced by reduction in score on her CHAVO-7 and PHQ-9 (scores of 15 and 16 at first measurment, respectively).     Intervention:   Person-Centered Therapy: Patient shared that she has been upset lately as she and her  have not been getting along.  She has started thinking about taking a break and trying to get better somewhere else. Processed this with patient.  Patient shared concerns about medications and side effects; encouraged her to have this conversation with her psychiatrist. Patient talked about some of the despair she has been feeling with the state of the world, including the politics.  Helped patient identify how to minimize media exposure.    Patient shared that she was able to clean off her couch, much positive verbal feedback was given for this.  Helped patient process how this felt and how to keep up with it. Talked about managing multiple doctors and she had frustrations with this, helped patient regulate and figure out how to navigate doctors.           ASSESSMENT: Current Emotional / Mental Status (status of significant symptoms):   Risk status (Self / Other harm or suicidal ideation)   Patient denies current fears or concerns for personal safety.   Patient reports the following current or recent suicidal ideation or behaviors: general hopelessness.   Patient denies current or recent  homicidal ideation or behaviors.   Patient denies current or recent self injurious behavior or ideation.   Patient denies other safety concerns.   Patient reports there has been no change in risk factors since their last session.     Patient reports there has been no change in protective factors since their last session.     Recommended that patient call 911 or go to the local ED should there be a change in any of these risk factors.     Appearance:   Appropriate    Eye Contact:   Poor   Psychomotor Behavior: Normal    Attitude:   Cooperative    Orientation:   All   Speech    Rate / Production: Normal/ Responsive    Volume:  Normal    Mood:    Anxious  Sad    Affect:    Tearful Worrisome    Thought Content:  Rumination    Thought Form:  Obsessive    Insight:    Fair      Medication Review:   No changes to current psychiatric medication(s)     Medication Compliance:   Yes     Changes in Health Issues:   Yes: has a cough     Chemical Use Review:   Substance Use: Chemical use reviewed, no active concerns identified      Tobacco Use: No current tobacco use.      Diagnosis:  1. MDD (major depressive disorder), recurrent episode, moderate (H)    2. Anxiety associated with depression    3. CHAVO (generalized anxiety disorder)      Collateral Reports Completed:   Not Applicable    PLAN: (Patient Tasks / Therapist Tasks / Other)  Ask about care coordinator    Add swimming to your routine  Consider meditation to help manage mood  Get out on [facility]                                                         ______________________________________________________________________    Treatment Plan    Patient's Name: Randi Cleary  YOB: 1955    Date: 2020    DSM5 Diagnoses: 296.32 (F33.1) Major Depressive Disorder, Recurrent Episode, Moderate With anxious distress  Psychosocial / Contextual Factors: Patient's entire family of origin has , she now has a sister-in-law and  as  "support.  Relationship with  is conflictual.  Patient is reporting increase in physical symptoms due to COVID-19.  Patient would like to get off of pain medications.  WHODAS: 42    Referral / Collaboration:  Referral to another professional/service is not indicated at this time.    Anticipated number of session or this episode of care: 12-15      MeasurableTreatment Goal(s) related to diagnosis / functional impairment(s)  Goal 1: Patient will \"jumpstart, getting going with the things I need to be doing around the house as far as picking up, doing things, trying to do something every day.  Also to lessen the animosity between me and my .\"    I will know I've met my goal when my shoulders are fixed and I can see.      Objective #A (Patient Action)    Patient will increase frequency of engaging her in ADLs.  Status: Continued - Date(s): 9/16/20    Intervention(s)  Therapist will engage patient in CBT, specifically behavioral activation.    Objective #B  Patient will track and record at least 5 pleasant exchanges with . Patient will be able to identify at least 5 positive traits about her . and how he relates to her.  Status: Continued - Date(s): 9/16/20    Intervention(s)  Therapist will teach assertiveness skills and assign homework related to relationship interactions.    Objective #C  Patient will reduce level of depressive and anxious features as evidenced by reduction in score on her CHAVO-7 and PHQ-9 (scores of 15 and 16 at first measurment, respectively).  Status: Continued - Date(s): 9/16/20    Intervention(s)  Therapist will engage patient in person-centered therapy and CBT.    Patient has reviewed and agreed to the above plan.      MICAELA SLADE  September 16, 2020  "

## 2020-09-24 ASSESSMENT — ANXIETY QUESTIONNAIRES: GAD7 TOTAL SCORE: 19

## 2020-09-24 NOTE — PATIENT INSTRUCTIONS
Ask about care coordinator    Add swimming to your routine  Consider meditation to help manage mood  Get out on porch

## 2020-09-25 ENCOUNTER — COMMUNICATION - HEALTHEAST (OUTPATIENT)
Dept: INTERNAL MEDICINE | Facility: CLINIC | Age: 65
End: 2020-09-25

## 2020-09-28 ENCOUNTER — RECORDS - HEALTHEAST (OUTPATIENT)
Dept: HEALTH INFORMATION MANAGEMENT | Facility: CLINIC | Age: 65
End: 2020-09-28

## 2020-09-29 ENCOUNTER — VIRTUAL VISIT (OUTPATIENT)
Dept: PSYCHOLOGY | Facility: CLINIC | Age: 65
End: 2020-09-29
Payer: MEDICARE

## 2020-09-29 DIAGNOSIS — F33.1 MDD (MAJOR DEPRESSIVE DISORDER), RECURRENT EPISODE, MODERATE (H): Primary | ICD-10-CM

## 2020-09-29 DIAGNOSIS — F41.1 GAD (GENERALIZED ANXIETY DISORDER): ICD-10-CM

## 2020-09-29 DIAGNOSIS — F41.8 ANXIETY ASSOCIATED WITH DEPRESSION: ICD-10-CM

## 2020-09-29 PROCEDURE — 90834 PSYTX W PT 45 MINUTES: CPT | Mod: 95 | Performed by: SOCIAL WORKER

## 2020-09-29 NOTE — PROGRESS NOTES
Progress Note    Patient Name: Randi Cleary  Date: 9/29/2020         Service Type: Individual      Session Start Time: 2:02 PM  Session End Time: 2:45 PM     Session Length: 43 minutes    Session #: 13    Attendees: Client attended alone    Service Modality:  Video Visit:    Telemedicine Visit: The patient's condition can be safely assessed and treated via synchronous audio and visual telemedicine encounter.      Reason for Telemedicine Visit: Services only offered telehealth    Originating Site (Patient Location): Patient's home    Distant Site (Provider Location): Provider Remote Setting    Consent:  The patient/guardian has verbally consented to: the potential risks and benefits of telemedicine (video visit) versus in person care; bill my insurance or make self-payment for services provided; and responsibility for payment of non-covered services.     Mode of Communication:  Video Conference via [employer]    As the provider I attest to compliance with applicable laws and regulations related to telemedicine.      Treatment Plan Last Reviewed: 9/16/20  PHQ-9 / CHAVO-7 :   PHQ 8/12/2020 9/10/2020 9/23/2020   PHQ-9 Total Score 18 7 17   Q9: Thoughts of better off dead/self-harm past 2 weeks Several days Not at all Several days   F/U: Thoughts of suicide or self-harm No - -   F/U: Safety concerns No - -     CHAVO-7 SCORE 8/12/2020 9/10/2020 9/23/2020   Total Score 11 (moderate anxiety) 16 (severe anxiety) -   Total Score 11 16 19         DATA  Interactive Complexity: No  Crisis: No       Progress Since Last Session (Related to Symptoms / Goals / Homework):   Symptoms: Patient continues to experience high levels of anger and express it as aggression, per her report    Homework: Partially completed  Add swimming to your routine -done  Consider meditation to help manage mood -not done  Get out on porch -done     Episode of Care Goals: Minimal progress - PREPARATION (Decided to  change - considering how); Intervened by negotiating a change plan and determining options / strategies for behavior change, identifying triggers, exploring social supports, and working towards setting a date to begin behavior change     Current / Ongoing Stressors and Concerns:   Patient is currently socially isolated. She has a conflictual relationship with her .  She is getting minimal physical activity due to her pool being closed.      Treatment Objective(s) Addressed in This Session:   Patient will increase frequency of engaging her in ADLs.  Patient will track and record at least 5 pleasant exchanges with . Patient will be able to identify at least 5 positive traits about her .  Patient will reduce level of depressive and anxious features as evidenced by reduction in score on her CHAVO-7 and PHQ-9 (scores of 15 and 16 at first measurment, respectively).     Intervention:   Person-Centered Therapy: Patient shared about mood swings; reviewed her conversations she has had with her doctors about anti-depressants.  Patient then jumped straight into talking about a conversation with her pulmonogist about potentially not being able to have surgery.      Talked about care coordination and the idea of getting someone to help her coordinate her appointments as she has so many in so many systems.  Suggested asking at her primary care clinic for a care coordinator, which she plans to do on Friday.    Talked about her decision to go off of anti-depressants and how if she chooses to not have medications to manage depression she needs to have other things she does to manage depression.  Right now she thinks the CPAP machine and swimming might help, but she also has a significant number of stressors, including with her  and isolation with COVID.  Talked about when she would make the decision to work with a psychiatrist to try medications again if she is not managing it well on its own.  Patient  shared her concern that she was on antidepressants for 30 years and she thought they were supposed to be a short term medication. Talked about some people do use them long term and encouraged her to have a conversation about this with her psychiatrist.            ASSESSMENT: Current Emotional / Mental Status (status of significant symptoms):   Risk status (Self / Other harm or suicidal ideation)   Patient denies current fears or concerns for personal safety.   Patient denies current or recent suicidal ideation or behaviors.   Patient denies current or recent homicidal ideation or behaviors.   Patient denies current or recent self injurious behavior or ideation.   Patient denies other safety concerns.   Patient reports there has been no change in risk factors since their last session.     Patient reports there has been no change in protective factors since their last session.     Recommended that patient call 911 or go to the local ED should there be a change in any of these risk factors.     Appearance:   Appropriate    Eye Contact:   Poor   Psychomotor Behavior: Normal    Attitude:   Cooperative    Orientation:   All   Speech    Rate / Production: Normal/ Responsive    Volume:  Normal    Mood:    Anxious  Sad    Affect:    Tearful Worrisome    Thought Content:  Rumination    Thought Form:  Obsessive    Insight:    Fair      Medication Review:   No changes to current psychiatric medication(s)     Medication Compliance:   Yes     Changes in Health Issues:   None reported     Chemical Use Review:   Substance Use: Chemical use reviewed, no active concerns identified      Tobacco Use: No current tobacco use.      Diagnosis:  1. MDD (major depressive disorder), recurrent episode, moderate (H)    2. Anxiety associated with depression    3. CHAVO (generalized anxiety disorder)      Collateral Reports Completed:   Not Applicable    PLAN: (Patient Tasks / Therapist Tasks / Other)  On Friday ask your doctor for a care  "coordinator  Continue going to the pool to see if this helps with mood  Resume using CPAP as directed by doctor              MICAELA SLADE                                                         ______________________________________________________________________    Treatment Plan    Patient's Name: Randi Cleary  YOB: 1955    Date: 2020    DSM5 Diagnoses: 296.32 (F33.1) Major Depressive Disorder, Recurrent Episode, Moderate With anxious distress  Psychosocial / Contextual Factors: Patient's entire family of origin has , she now has a sister-in-law and  as support.  Relationship with  is conflictual.  Patient is reporting increase in physical symptoms due to COVID-19.  Patient would like to get off of pain medications.  WHODAS: 42    Referral / Collaboration:  Referral to another professional/service is not indicated at this time.    Anticipated number of session or this episode of care: 12-15      MeasurableTreatment Goal(s) related to diagnosis / functional impairment(s)  Goal 1: Patient will \"jumpstart, getting going with the things I need to be doing around the house as far as picking up, doing things, trying to do something every day.  Also to lessen the animosity between me and my .\"    I will know I've met my goal when my shoulders are fixed and I can see.      Objective #A (Patient Action)    Patient will increase frequency of engaging her in ADLs.  Status: Continued - Date(s): 20    Intervention(s)  Therapist will engage patient in CBT, specifically behavioral activation.    Objective #B  Patient will track and record at least 5 pleasant exchanges with . Patient will be able to identify at least 5 positive traits about her . and how he relates to her.  Status: Continued - Date(s): 20    Intervention(s)  Therapist will teach assertiveness skills and assign homework related to relationship interactions.    Objective #C  Patient " will reduce level of depressive and anxious features as evidenced by reduction in score on her CHAVO-7 and PHQ-9 (scores of 15 and 16 at first measurment, respectively).  Status: Continued - Date(s): 9/16/20    Intervention(s)  Therapist will engage patient in person-centered therapy and CBT.    Patient has reviewed and agreed to the above plan.      MICAELA SLADE  September 16, 2020

## 2020-09-29 NOTE — PATIENT INSTRUCTIONS
On Friday ask your doctor for a care coordinator  Continue going to the pool to see if this helps with mood  Resume using CPAP as directed by doctor

## 2020-09-30 ENCOUNTER — OFFICE VISIT - HEALTHEAST (OUTPATIENT)
Dept: PHARMACY | Facility: CLINIC | Age: 65
End: 2020-09-30

## 2020-09-30 ENCOUNTER — OFFICE VISIT - HEALTHEAST (OUTPATIENT)
Dept: BEHAVIORAL HEALTH | Facility: CLINIC | Age: 65
End: 2020-09-30

## 2020-09-30 DIAGNOSIS — J44.9 CHRONIC OBSTRUCTIVE PULMONARY DISEASE, UNSPECIFIED COPD TYPE (H): ICD-10-CM

## 2020-09-30 DIAGNOSIS — M25.551 PAIN IN JOINT INVOLVING RIGHT PELVIC REGION AND THIGH: ICD-10-CM

## 2020-09-30 DIAGNOSIS — G25.81 RESTLESS LEGS SYNDROME (RLS): ICD-10-CM

## 2020-09-30 DIAGNOSIS — E89.0 POSTABLATIVE HYPOTHYROIDISM: ICD-10-CM

## 2020-09-30 DIAGNOSIS — G90.81 SEROTONIN SYNDROME: ICD-10-CM

## 2020-09-30 DIAGNOSIS — D35.00 PHEOCHROMOCYTOMA, UNSPECIFIED LATERALITY: ICD-10-CM

## 2020-09-30 DIAGNOSIS — F32.9 CHRONIC MAJOR DEPRESSIVE DISORDER: ICD-10-CM

## 2020-09-30 DIAGNOSIS — F41.1 GENERALIZED ANXIETY DISORDER: ICD-10-CM

## 2020-09-30 DIAGNOSIS — E11.9 TYPE 2 DIABETES MELLITUS WITHOUT COMPLICATION, WITHOUT LONG-TERM CURRENT USE OF INSULIN (H): ICD-10-CM

## 2020-09-30 DIAGNOSIS — I27.20 PULMONARY HYPERTENSION (H): ICD-10-CM

## 2020-09-30 PROCEDURE — 99607 MTMS BY PHARM ADDL 15 MIN: CPT | Performed by: PHARMACIST

## 2020-09-30 PROCEDURE — 99605 MTMS BY PHARM NP 15 MIN: CPT | Performed by: PHARMACIST

## 2020-09-30 ASSESSMENT — ANXIETY QUESTIONNAIRES
GAD7 TOTAL SCORE: 14
2. NOT BEING ABLE TO STOP OR CONTROL WORRYING: MORE THAN HALF THE DAYS
6. BECOMING EASILY ANNOYED OR IRRITABLE: NEARLY EVERY DAY
7. FEELING AFRAID AS IF SOMETHING AWFUL MIGHT HAPPEN: NOT AT ALL
3. WORRYING TOO MUCH ABOUT DIFFERENT THINGS: MORE THAN HALF THE DAYS
1. FEELING NERVOUS, ANXIOUS, OR ON EDGE: NEARLY EVERY DAY
4. TROUBLE RELAXING: NEARLY EVERY DAY
5. BEING SO RESTLESS THAT IT IS HARD TO SIT STILL: SEVERAL DAYS

## 2020-09-30 ASSESSMENT — PATIENT HEALTH QUESTIONNAIRE - PHQ9: SUM OF ALL RESPONSES TO PHQ QUESTIONS 1-9: 18

## 2020-10-01 ENCOUNTER — COMMUNICATION - HEALTHEAST (OUTPATIENT)
Dept: INTERNAL MEDICINE | Facility: CLINIC | Age: 65
End: 2020-10-01

## 2020-10-01 ENCOUNTER — COMMUNICATION - HEALTHEAST (OUTPATIENT)
Dept: NURSING | Facility: CLINIC | Age: 65
End: 2020-10-01

## 2020-10-01 ENCOUNTER — AMBULATORY - HEALTHEAST (OUTPATIENT)
Dept: PHARMACY | Facility: CLINIC | Age: 65
End: 2020-10-01

## 2020-10-01 DIAGNOSIS — F41.9 ANXIETY: ICD-10-CM

## 2020-10-02 ENCOUNTER — COMMUNICATION - HEALTHEAST (OUTPATIENT)
Dept: NURSING | Facility: CLINIC | Age: 65
End: 2020-10-02

## 2020-10-02 ENCOUNTER — OFFICE VISIT - HEALTHEAST (OUTPATIENT)
Dept: INTERNAL MEDICINE | Facility: CLINIC | Age: 65
End: 2020-10-02

## 2020-10-02 ENCOUNTER — MEDICAL CORRESPONDENCE (OUTPATIENT)
Dept: HEALTH INFORMATION MANAGEMENT | Facility: CLINIC | Age: 65
End: 2020-10-02

## 2020-10-02 DIAGNOSIS — I15.2 HYPERTENSION DUE TO ENDOCRINE DISORDER: ICD-10-CM

## 2020-10-02 DIAGNOSIS — E03.9 ACQUIRED HYPOTHYROIDISM: ICD-10-CM

## 2020-10-02 DIAGNOSIS — E83.110 HEREDITARY HEMOCHROMATOSIS (H): ICD-10-CM

## 2020-10-02 DIAGNOSIS — G90.81: ICD-10-CM

## 2020-10-02 DIAGNOSIS — G25.81 RESTLESS LEGS SYNDROME (RLS): ICD-10-CM

## 2020-10-02 DIAGNOSIS — F41.1 ANXIETY STATE: ICD-10-CM

## 2020-10-02 DIAGNOSIS — E87.6 HYPOKALEMIA: ICD-10-CM

## 2020-10-02 DIAGNOSIS — R73.01 IMPAIRED FASTING GLUCOSE: ICD-10-CM

## 2020-10-02 ASSESSMENT — MIFFLIN-ST. JEOR: SCORE: 1706.15

## 2020-10-05 LAB
6MAM UR QL: NOT DETECTED
7AMINOCLONAZEPAM UR QL: NOT DETECTED
A-OH ALPRAZ UR QL: NOT DETECTED
ALPRAZ UR QL: NOT DETECTED
AMPHET UR QL SCN: NOT DETECTED
BARBITURATES UR QL: NOT DETECTED
BUPRENORPHINE UR QL: NOT DETECTED
BZE UR QL: NOT DETECTED
CARBOXYTHC UR QL: PRESENT
CARISOPRODOL UR QL: NOT DETECTED
CLONAZEPAM UR QL: NOT DETECTED
CODEINE UR QL: NOT DETECTED
CREAT UR-MCNC: 122.6 MG/DL (ref 20–400)
DIAZEPAM UR QL: NOT DETECTED
ETHYL GLUCURONIDE UR QL: NOT DETECTED
FENTANYL UR QL: NOT DETECTED
HYDROCODONE UR QL: NOT DETECTED
HYDROMORPHONE UR QL: NOT DETECTED
LORAZEPAM UR QL: NOT DETECTED
MDA UR QL: NOT DETECTED
MDEA UR QL: NOT DETECTED
MDMA UR QL: NOT DETECTED
ME-PHENIDATE UR QL: NOT DETECTED
MEPERIDINE UR QL: NOT DETECTED
METHADONE UR QL: NOT DETECTED
METHAMPHET UR QL: NOT DETECTED
MIDAZOLAM UR QL SCN: NOT DETECTED
MORPHINE UR QL: NOT DETECTED
NORBUPRENORPHINE UR QL CFM: NOT DETECTED
NORDIAZEPAM UR QL: NOT DETECTED
NORFENTANYL UR QL: NOT DETECTED
NORHYDROCODONE UR QL CFM: NOT DETECTED
NOROXYCODONE UR QL CFM: NOT DETECTED
NOROXYMORPHONE UR QL SCN: NOT DETECTED
OXAZEPAM UR QL: PRESENT
OXYCODONE UR QL: NOT DETECTED
OXYMORPHONE UR QL: NOT DETECTED
PATHOLOGY STUDY: NORMAL
PCP UR QL: NOT DETECTED
PHENTERMINE UR QL: NOT DETECTED
PROPOXYPH UR QL: NOT DETECTED
SERVICE CMNT-IMP: NORMAL
TAPENTADOL UR QL SCN: NOT DETECTED
TAPENTADOL UR QL SCN: NOT DETECTED
TEMAZEPAM UR QL: PRESENT
TRAMADOL UR QL: NOT DETECTED
ZOLPIDEM UR QL: NOT DETECTED

## 2020-10-06 ENCOUNTER — VIRTUAL VISIT (OUTPATIENT)
Dept: PSYCHOLOGY | Facility: CLINIC | Age: 65
End: 2020-10-06
Payer: MEDICARE

## 2020-10-06 ENCOUNTER — OFFICE VISIT - HEALTHEAST (OUTPATIENT)
Dept: PHARMACY | Facility: CLINIC | Age: 65
End: 2020-10-06

## 2020-10-06 DIAGNOSIS — F33.1 MDD (MAJOR DEPRESSIVE DISORDER), RECURRENT EPISODE, MODERATE (H): Primary | ICD-10-CM

## 2020-10-06 DIAGNOSIS — M25.551 PAIN IN JOINT INVOLVING RIGHT PELVIC REGION AND THIGH: ICD-10-CM

## 2020-10-06 DIAGNOSIS — F41.8 ANXIETY ASSOCIATED WITH DEPRESSION: ICD-10-CM

## 2020-10-06 DIAGNOSIS — J44.9 CHRONIC OBSTRUCTIVE PULMONARY DISEASE, UNSPECIFIED COPD TYPE (H): ICD-10-CM

## 2020-10-06 DIAGNOSIS — E89.0 POSTABLATIVE HYPOTHYROIDISM: ICD-10-CM

## 2020-10-06 DIAGNOSIS — D35.00 PHEOCHROMOCYTOMA, UNSPECIFIED LATERALITY: ICD-10-CM

## 2020-10-06 DIAGNOSIS — I27.20 PULMONARY HYPERTENSION (H): ICD-10-CM

## 2020-10-06 DIAGNOSIS — F41.1 GAD (GENERALIZED ANXIETY DISORDER): ICD-10-CM

## 2020-10-06 DIAGNOSIS — G25.81 RESTLESS LEGS SYNDROME (RLS): ICD-10-CM

## 2020-10-06 DIAGNOSIS — E11.9 TYPE 2 DIABETES MELLITUS WITHOUT COMPLICATION, WITHOUT LONG-TERM CURRENT USE OF INSULIN (H): ICD-10-CM

## 2020-10-06 DIAGNOSIS — E55.9 VITAMIN D DEFICIENCY: ICD-10-CM

## 2020-10-06 DIAGNOSIS — G90.81 SEROTONIN SYNDROME: ICD-10-CM

## 2020-10-06 PROCEDURE — 99606 MTMS BY PHARM EST 15 MIN: CPT | Performed by: PHARMACIST

## 2020-10-06 PROCEDURE — 99607 MTMS BY PHARM ADDL 15 MIN: CPT | Performed by: PHARMACIST

## 2020-10-06 PROCEDURE — 90834 PSYTX W PT 45 MINUTES: CPT | Mod: 95 | Performed by: SOCIAL WORKER

## 2020-10-06 ASSESSMENT — ANXIETY QUESTIONNAIRES
7. FEELING AFRAID AS IF SOMETHING AWFUL MIGHT HAPPEN: MORE THAN HALF THE DAYS
3. WORRYING TOO MUCH ABOUT DIFFERENT THINGS: MORE THAN HALF THE DAYS
5. BEING SO RESTLESS THAT IT IS HARD TO SIT STILL: MORE THAN HALF THE DAYS
1. FEELING NERVOUS, ANXIOUS, OR ON EDGE: NEARLY EVERY DAY
4. TROUBLE RELAXING: NEARLY EVERY DAY
2. NOT BEING ABLE TO STOP OR CONTROL WORRYING: MORE THAN HALF THE DAYS
6. BECOMING EASILY ANNOYED OR IRRITABLE: NEARLY EVERY DAY
GAD7 TOTAL SCORE: 17

## 2020-10-06 ASSESSMENT — PATIENT HEALTH QUESTIONNAIRE - PHQ9: SUM OF ALL RESPONSES TO PHQ QUESTIONS 1-9: 18

## 2020-10-06 NOTE — PATIENT INSTRUCTIONS
Resume using CPAP as directed by doctor  Work with doctor to rule out any other medical concerns that could be contributing to mood

## 2020-10-07 ENCOUNTER — COMMUNICATION - HEALTHEAST (OUTPATIENT)
Dept: INTERNAL MEDICINE | Facility: CLINIC | Age: 65
End: 2020-10-07

## 2020-10-07 DIAGNOSIS — E87.6 HYPOKALEMIA: ICD-10-CM

## 2020-10-07 DIAGNOSIS — J44.9 CHRONIC OBSTRUCTIVE PULMONARY DISEASE, UNSPECIFIED COPD TYPE (H): ICD-10-CM

## 2020-10-07 ASSESSMENT — ANXIETY QUESTIONNAIRES: GAD7 TOTAL SCORE: 17

## 2020-10-12 ENCOUNTER — TELEPHONE (OUTPATIENT)
Dept: GASTROENTEROLOGY | Facility: CLINIC | Age: 65
End: 2020-10-12

## 2020-10-12 NOTE — TELEPHONE ENCOUNTER
Spoke with Winnie about getting her colonoscopy scheduled. She stated that she does not want to go ahead with scheduling at this time as she has a different surgery coming up. She will call back to schedule her procedure at a later time.

## 2020-10-15 ENCOUNTER — VIRTUAL VISIT (OUTPATIENT)
Dept: PSYCHOLOGY | Facility: CLINIC | Age: 65
End: 2020-10-15
Payer: MEDICARE

## 2020-10-15 DIAGNOSIS — F41.8 ANXIETY ASSOCIATED WITH DEPRESSION: ICD-10-CM

## 2020-10-15 DIAGNOSIS — F33.1 MDD (MAJOR DEPRESSIVE DISORDER), RECURRENT EPISODE, MODERATE (H): Primary | ICD-10-CM

## 2020-10-15 DIAGNOSIS — F41.1 GAD (GENERALIZED ANXIETY DISORDER): ICD-10-CM

## 2020-10-15 PROCEDURE — 90834 PSYTX W PT 45 MINUTES: CPT | Mod: 95 | Performed by: SOCIAL WORKER

## 2020-10-15 NOTE — PROGRESS NOTES
Progress Note    Patient Name: Randi Cleary  Date: 10/15/2020         Service Type: Individual      Session Start Time: 11:33 AM  Session End Time: 12:18 PM     Session Length: 45 minutes    Session #: 15    Attendees: Client attended alone    Service Modality:  Video Visit:    Telemedicine Visit: The patient's condition can be safely assessed and treated via synchronous audio and visual telemedicine encounter.      Reason for Telemedicine Visit: Services only offered telehealth    Originating Site (Patient Location): Patient's home    Distant Site (Provider Location): Provider Remote Setting    Consent:  The patient/guardian has verbally consented to: the potential risks and benefits of telemedicine (video visit) versus in person care; bill my insurance or make self-payment for services provided; and responsibility for payment of non-covered services.     Mode of Communication:  Video Conference via [employer]    As the provider I attest to compliance with applicable laws and regulations related to telemedicine.      Treatment Plan Last Reviewed: 9/16/20  PHQ-9 / CHAVO-7 :   PHQ 9/10/2020 9/23/2020 10/6/2020   PHQ-9 Total Score 7 17 18   Q9: Thoughts of better off dead/self-harm past 2 weeks Not at all Several days More than half the days   F/U: Thoughts of suicide or self-harm - - -   F/U: Safety concerns - - -     CHAVO-7 SCORE 9/10/2020 9/23/2020 10/6/2020   Total Score 16 (severe anxiety) - -   Total Score 16 19 17         DATA  Interactive Complexity: No  Crisis: No       Progress Since Last Session (Related to Symptoms / Goals / Homework):   Symptoms: Patient reports feeling a little teary lately    Homework: Partially completed  Resume using CPAP as directed by doctor -done  Work with doctor to rule out any other medical concerns that could be contributing to mood -not done     Episode of Care Goals: Minimal progress - PREPARATION (Decided to change - considering  how); Intervened by negotiating a change plan and determining options / strategies for behavior change, identifying triggers, exploring social supports, and working towards setting a date to begin behavior change     Current / Ongoing Stressors and Concerns:   Patient is currently socially isolated. She has a conflictual relationship with her .  She is getting minimal physical activity due to her pool being closed.      Treatment Objective(s) Addressed in This Session:   Patient will increase frequency of engaging her in ADLs.  Patient will track and record at least 5 pleasant exchanges with . Patient will be able to identify at least 5 positive traits about her .  Patient will reduce level of depressive and anxious features as evidenced by reduction in score on her CHAVO-7 and PHQ-9 (scores of 15 and 16 at first measurment, respectively).     Intervention:   Person-Centered Therapy: Patient talked about how she notices her rage is a little better when she uses her CPAP machine. She also reported she is starting to take breaks from her  when she feels he is being too pushy for her. Patient discussed her concerns with her eyes lately and went through how she was making her decisions on how to move forward with her eye appointment.  Patient also shared that she has been going to the pool for therapy lately.     Discussed sleep and how to shift sleep pattern as her current pattern is not working for her. Talked about techniques to shift her pattern.  Also talked about her lack of motivation lately and how to slowly build up emotion.  Dicussed her being teary and helped her identify which emotions she was feeling when she was teary, she was able to identify hopelessness and despair. Talked about continuing to identify emotions and acknowledging them.           ASSESSMENT: Current Emotional / Mental Status (status of significant symptoms):   Risk status (Self / Other harm or suicidal  ideation)   Patient denies current fears or concerns for personal safety.   Patient denies current or recent suicidal ideation or behaviors.   Patient denies current or recent homicidal ideation or behaviors.   Patient denies current or recent self injurious behavior or ideation.   Patient denies other safety concerns.   Patient reports there has been no change in risk factors since their last session.     Patient reports there has been no change in protective factors since their last session.     Recommended that patient call 911 or go to the local ED should there be a change in any of these risk factors.     Appearance:   Appropriate    Eye Contact:   Fair    Psychomotor Behavior: Normal    Attitude:   Cooperative    Orientation:   All   Speech    Rate / Production: Normal/ Responsive    Volume:  Normal    Mood:    Anxious  Sad    Affect:    Tearful Worrisome    Thought Content:  Rumination    Thought Form:  Obsessive    Insight:    Fair      Medication Review:   No changes to current psychiatric medication(s)     Medication Compliance:   Yes     Changes in Health Issues:   None reported     Chemical Use Review:   Substance Use: Chemical use reviewed, no active concerns identified      Tobacco Use: No current tobacco use.      Diagnosis:  1. MDD (major depressive disorder), recurrent episode, moderate (H)    2. Anxiety associated with depression    3. CHAVO (generalized anxiety disorder)      Collateral Reports Completed:   Not Applicable    PLAN: (Patient Tasks / Therapist Tasks / Other)  Get up at 7 AM and stay awake all day to get onto new sleep pattern  Resume using CPAP as directed by doctor  Acknowledge feelings as you have them  Work with doctor to rule out any other medical concerns that could be contributing to mood    Next session review feelings, process family dynamics.           MICAELA SLADE    October 15, 2020                                                      "    ______________________________________________________________________    Treatment Plan    Patient's Name: Randi Cleary  YOB: 1955    Date: 2020    DSM5 Diagnoses: 296.32 (F33.1) Major Depressive Disorder, Recurrent Episode, Moderate With anxious distress  Psychosocial / Contextual Factors: Patient's entire family of origin has , she now has a sister-in-law and  as support.  Relationship with  is conflictual.  Patient is reporting increase in physical symptoms due to COVID-19.  Patient would like to get off of pain medications.  WHODAS: 42    Referral / Collaboration:  Referral to another professional/service is not indicated at this time.    Anticipated number of session or this episode of care: 12-15      MeasurableTreatment Goal(s) related to diagnosis / functional impairment(s)  Goal 1: Patient will \"jumpstart, getting going with the things I need to be doing around the house as far as picking up, doing things, trying to do something every day.  Also to lessen the animosity between me and my .\"    I will know I've met my goal when my shoulders are fixed and I can see.      Objective #A (Patient Action)    Patient will increase frequency of engaging her in ADLs.  Status: Continued - Date(s): 20    Intervention(s)  Therapist will engage patient in CBT, specifically behavioral activation.    Objective #B  Patient will track and record at least 5 pleasant exchanges with . Patient will be able to identify at least 5 positive traits about her . and how he relates to her.  Status: Continued - Date(s): 20    Intervention(s)  Therapist will teach assertiveness skills and assign homework related to relationship interactions.    Objective #C  Patient will reduce level of depressive and anxious features as evidenced by reduction in score on her CHAVO-7 and PHQ-9 (scores of 15 and 16 at first measurment, respectively).  Status: Continued - Date(s): " 9/16/20    Intervention(s)  Therapist will engage patient in person-centered therapy and CBT.    Patient has reviewed and agreed to the above plan.      B MICAELA BAKER  September 16, 2020

## 2020-10-15 NOTE — PATIENT INSTRUCTIONS
Get up at 7 AM and stay awake all day to get onto new sleep pattern  Resume using CPAP as directed by doctor  Work with doctor to rule out any other medical concerns that could be contributing to mood

## 2020-10-19 ENCOUNTER — PATIENT OUTREACH (OUTPATIENT)
Dept: ONCOLOGY | Facility: CLINIC | Age: 65
End: 2020-10-19

## 2020-10-19 ENCOUNTER — TELEPHONE (OUTPATIENT)
Dept: ENDOCRINOLOGY | Facility: CLINIC | Age: 65
End: 2020-10-19

## 2020-10-19 NOTE — TELEPHONE ENCOUNTER
Verified with patient to have CPAP for a week prior to getting labs completed labs faxed over to [facility] [phone].

## 2020-10-19 NOTE — TELEPHONE ENCOUNTER
[facility] Call Center    Phone Message    May a detailed message be left on voicemail: yes     Reason for Call: Other: Pt called to confirm she was supposed to be on CPAP for 1 week before test. Pt wanted to make sure the test was being done correctly. Please review and follow up with pt     Action Taken: Message routed to:  [facility] ([facility]): Endo    Travel Screening: Not Applicable

## 2020-10-19 NOTE — PROGRESS NOTES
Writer placed call to Winnie to discuss upcoming labs and imaging. Winnie had questions about the abdominal MRI. Writer advised that this is standard imaging to look for iron concentrations in the liver. Winnie voiced understanding and we then reviewed the specific labs she will have drawn. No other questions or concerns addressed, she will contact her PCP to see if MRI should be added to check for progression of hiatal hernia.

## 2020-10-21 ENCOUNTER — AMBULATORY - HEALTHEAST (OUTPATIENT)
Dept: LAB | Facility: CLINIC | Age: 65
End: 2020-10-21

## 2020-10-21 DIAGNOSIS — D35.00 PHEOCHROMOCYTOMA, UNSPECIFIED LATERALITY: ICD-10-CM

## 2020-10-21 DIAGNOSIS — G25.81 RESTLESS LEG SYNDROME: ICD-10-CM

## 2020-10-21 DIAGNOSIS — E55.9 VITAMIN D DEFICIENCY: ICD-10-CM

## 2020-10-21 DIAGNOSIS — E89.0 POSTSURGICAL HYPOTHYROIDISM: ICD-10-CM

## 2020-10-21 DIAGNOSIS — E53.8 VITAMIN B12 DEFICIENCY: ICD-10-CM

## 2020-10-21 DIAGNOSIS — Z79.899 HIGH RISK MEDICATION USE: ICD-10-CM

## 2020-10-21 DIAGNOSIS — E11.9 DIABETES MELLITUS (H): ICD-10-CM

## 2020-10-21 LAB — POTASSIUM BLD-SCNC: 4.2 MMOL/L (ref 3.5–5)

## 2020-10-23 ENCOUNTER — COMMUNICATION - HEALTHEAST (OUTPATIENT)
Dept: PALLIATIVE MEDICINE | Facility: OTHER | Age: 65
End: 2020-10-23

## 2020-10-23 DIAGNOSIS — M25.551 PAIN IN JOINT INVOLVING RIGHT PELVIC REGION AND THIGH: ICD-10-CM

## 2020-10-24 LAB — ALDOST SERPL-MCNC: 16 NG/DL

## 2020-10-26 LAB
ANNOTATION COMMENT IMP: NORMAL
METANEPHS SERPL-SCNC: 0.11 NMOL/L (ref 0–0.49)
NORMETANEPHRINE SERPL-SCNC: 0.49 NMOL/L (ref 0–0.89)

## 2020-10-27 ENCOUNTER — VIRTUAL VISIT (OUTPATIENT)
Dept: PSYCHOLOGY | Facility: CLINIC | Age: 65
End: 2020-10-27
Payer: MEDICARE

## 2020-10-27 DIAGNOSIS — F41.8 ANXIETY ASSOCIATED WITH DEPRESSION: ICD-10-CM

## 2020-10-27 DIAGNOSIS — F33.1 MDD (MAJOR DEPRESSIVE DISORDER), RECURRENT EPISODE, MODERATE (H): Primary | ICD-10-CM

## 2020-10-27 DIAGNOSIS — F41.1 GAD (GENERALIZED ANXIETY DISORDER): ICD-10-CM

## 2020-10-27 PROCEDURE — 90834 PSYTX W PT 45 MINUTES: CPT | Performed by: SOCIAL WORKER

## 2020-10-27 PROCEDURE — 90785 PSYTX COMPLEX INTERACTIVE: CPT | Performed by: SOCIAL WORKER

## 2020-10-27 NOTE — PATIENT INSTRUCTIONS
make an appointment with Dr. Dubois to get on antidepressants  have  help get a bed  call the hospital about the urine test

## 2020-10-27 NOTE — PROGRESS NOTES
Progress Note    Patient Name: Randi Cleary  Date: 10/27/2020         Service Type: Individual      Session Start Time: 10:31 AM  Session End Time: 11:23 AM     Session Length: 52 minutes    Session #: 16    Attendees: Client attended alone    Service Modality:  Video Visit:    Telemedicine Visit: The patient's condition can be safely assessed and treated via synchronous audio and visual telemedicine encounter.      Reason for Telemedicine Visit: Services only offered telehealth    Originating Site (Patient Location): Patient's home    Distant Site (Provider Location): Provider Remote Setting    Consent:  The patient/guardian has verbally consented to: the potential risks and benefits of telemedicine (video visit) versus in person care; bill my insurance or make self-payment for services provided; and responsibility for payment of non-covered services.     Mode of Communication:  Video Conference via [employer]    As the provider I attest to compliance with applicable laws and regulations related to telemedicine.      Treatment Plan Last Reviewed: 9/16/20  PHQ-9 / CHAVO-7 :   PHQ 9/23/2020 10/6/2020 10/27/2020   PHQ-9 Total Score 17 18 20   Q9: Thoughts of better off dead/self-harm past 2 weeks Several days More than half the days Several days   F/U: Thoughts of suicide or self-harm - - Yes   F/U: Self harm-plan - - No   F/U: Self-harm action - - No   F/U: Safety concerns - - No     CHAVO-7 SCORE 9/23/2020 10/6/2020 10/27/2020   Total Score - - 17 (severe anxiety)   Total Score 19 17 17         DATA  Interactive Complexity: Yes, visit entailed Interactive Complexity evidenced by:  -The need to manage maladaptive communication (related to, e.g., high anxiety, high reactivity, repeated questions, or disagreement) among participants that complicates delivery of care  Crisis: No       Progress Since Last Session (Related to Symptoms / Goals / Homework):   Symptoms: Patient  reports continuing to be teary-eyed and not knowing why    Homework: Partially completed  Get up at 7 AM and stay awake all day to get onto new sleep pattern -not done  Resume using CPAP as directed by doctor -done  Acknowledge feelings as you have them -not done  Work with doctor to rule out any other medical concerns that could be contributing to mood -done       Episode of Care Goals: Minimal progress - PREPARATION (Decided to change - considering how); Intervened by negotiating a change plan and determining options / strategies for behavior change, identifying triggers, exploring social supports, and working towards setting a date to begin behavior change     Current / Ongoing Stressors and Concerns:   Patient is currently socially isolated. She has a conflictual relationship with her .  She is getting minimal physical activity due to her pool being closed.      Treatment Objective(s) Addressed in This Session:   Patient will increase frequency of engaging her in ADLs.  Patient will track and record at least 5 pleasant exchanges with . Patient will be able to identify at least 5 positive traits about her .  Patient will reduce level of depressive and anxious features as evidenced by reduction in score on her CHAVO-7 and PHQ-9 (scores of 15 and 16 at first measurment, respectively).     Intervention:   Person-Centered Therapy: Discussed patient's mood concerns. Also talked about her illness and how she thinks it is related to using her CPAP machine, so she bought something to clean it regularly. Discussed frustration with navigating the medical system and all the overwhelmed feelings she has. Reviewed safety and patient talked about sometimes feeling like it wasn't worth it, but never wanted to harm herself or doing anything to end her life.  Discussed how she wants [employer] testing and how to have this conversation with her doctor. Helped patient identify what was in her power to do and how  "to take these steps. Decided patient would make an appointment with Dr. Dubois to get on antidepressants, have her  help get a bed, call the hospital about the urine test.          ASSESSMENT: Current Emotional / Mental Status (status of significant symptoms):   Risk status (Self / Other harm or suicidal ideation)   Patient denies current fears or concerns for personal safety.   Patient reports the following current or recent suicidal ideation or behaviors: \"life is not worth it\" patient shared that her incredible frustration is overwhelming, but she has no plans to harm herself or end her life.   Patient denies current or recent homicidal ideation or behaviors.   Patient denies current or recent self injurious behavior or ideation.   Patient denies other safety concerns.   Patient reports there has been no change in risk factors since their last session.     Patient reports there has been no change in protective factors since their last session.     Recommended that patient call 911 or go to the local ED should there be a change in any of these risk factors.     Appearance:   Appropriate    Eye Contact:   Fair    Psychomotor Behavior: Normal    Attitude:   Cooperative    Orientation:   All   Speech    Rate / Production: Normal/ Responsive    Volume:  Normal    Mood:    Anxious  Sad , frustrated, overwhelmed   Affect:    Tearful Worrisome , sobbing at times   Thought Content:  Rumination    Thought Form:  Obsessive    Insight:    Fair      Medication Review:   No changes to current psychiatric medication(s)     Medication Compliance:   Yes     Changes in Health Issues:   None reported     Chemical Use Review:   Substance Use: Chemical use reviewed, no active concerns identified      Tobacco Use: No current tobacco use.      Diagnosis:  1. MDD (major depressive disorder), recurrent episode, moderate (H)    2. Anxiety associated with depression    3. CHAVO (generalized anxiety disorder)      Collateral Reports " "Completed:   Not Applicable    PLAN: (Patient Tasks / Therapist Tasks / Other)  make an appointment with Dr. Dubois to get on antidepressants  have  help get a bed  call the hospital about the urine test          MICAELA SLADE    2020                                                         ______________________________________________________________________    Treatment Plan    Patient's Name: Randi Cleary  YOB: 1955    Date: 2020    DSM5 Diagnoses: 296.32 (F33.1) Major Depressive Disorder, Recurrent Episode, Moderate With anxious distress  Psychosocial / Contextual Factors: Patient's entire family of origin has , she now has a sister-in-law and  as support.  Relationship with  is conflictual.  Patient is reporting increase in physical symptoms due to COVID-19.  Patient would like to get off of pain medications.  WHODAS: 42    Referral / Collaboration:  Referral to another professional/service is not indicated at this time.    Anticipated number of session or this episode of care: 12-15      MeasurableTreatment Goal(s) related to diagnosis / functional impairment(s)  Goal 1: Patient will \"jumpstart, getting going with the things I need to be doing around the house as far as picking up, doing things, trying to do something every day.  Also to lessen the animosity between me and my .\"    I will know I've met my goal when my shoulders are fixed and I can see.      Objective #A (Patient Action)    Patient will increase frequency of engaging her in ADLs.  Status: Continued - Date(s): 20    Intervention(s)  Therapist will engage patient in CBT, specifically behavioral activation.    Objective #B  Patient will track and record at least 5 pleasant exchanges with . Patient will be able to identify at least 5 positive traits about her . and how he relates to her.  Status: Continued - Date(s): 20    Intervention(s)  Therapist " will teach assertiveness skills and assign homework related to relationship interactions.    Objective #C  Patient will reduce level of depressive and anxious features as evidenced by reduction in score on her CHAVO-7 and PHQ-9 (scores of 15 and 16 at first measurment, respectively).  Status: Continued - Date(s): 9/16/20    Intervention(s)  Therapist will engage patient in person-centered therapy and CBT.    Patient has reviewed and agreed to the above plan.      B MICAELA BAKER  September 16, 2020

## 2020-10-28 ENCOUNTER — OFFICE VISIT - HEALTHEAST (OUTPATIENT)
Dept: INTERNAL MEDICINE | Facility: CLINIC | Age: 65
End: 2020-10-28

## 2020-10-28 ENCOUNTER — COMMUNICATION - HEALTHEAST (OUTPATIENT)
Dept: INTERNAL MEDICINE | Facility: CLINIC | Age: 65
End: 2020-10-28

## 2020-10-28 DIAGNOSIS — I27.20 PULMONARY HYPERTENSION (H): ICD-10-CM

## 2020-10-28 DIAGNOSIS — F41.1 ANXIETY STATE: ICD-10-CM

## 2020-10-28 DIAGNOSIS — E11.9 TYPE 2 DIABETES MELLITUS WITHOUT COMPLICATION, WITHOUT LONG-TERM CURRENT USE OF INSULIN (H): ICD-10-CM

## 2020-10-28 DIAGNOSIS — E66.01 MORBID OBESITY (H): ICD-10-CM

## 2020-10-28 DIAGNOSIS — I15.2 HYPERTENSION DUE TO ENDOCRINE DISORDER: ICD-10-CM

## 2020-10-28 DIAGNOSIS — J44.9 CHRONIC OBSTRUCTIVE PULMONARY DISEASE, UNSPECIFIED COPD TYPE (H): ICD-10-CM

## 2020-10-28 DIAGNOSIS — G47.33 SLEEP APNEA, OBSTRUCTIVE: ICD-10-CM

## 2020-10-28 DIAGNOSIS — F32.2 SEVERE MAJOR DEPRESSION (H): ICD-10-CM

## 2020-10-28 DIAGNOSIS — C50.912 MALIGNANT NEOPLASM OF LEFT FEMALE BREAST, UNSPECIFIED ESTROGEN RECEPTOR STATUS, UNSPECIFIED SITE OF BREAST (H): ICD-10-CM

## 2020-10-29 ENCOUNTER — COMMUNICATION - HEALTHEAST (OUTPATIENT)
Dept: PALLIATIVE MEDICINE | Facility: OTHER | Age: 65
End: 2020-10-29

## 2020-10-29 ENCOUNTER — OFFICE VISIT - HEALTHEAST (OUTPATIENT)
Dept: PALLIATIVE MEDICINE | Facility: OTHER | Age: 65
End: 2020-10-29

## 2020-10-29 ENCOUNTER — RECORDS - HEALTHEAST (OUTPATIENT)
Dept: ADMINISTRATIVE | Facility: OTHER | Age: 65
End: 2020-10-29

## 2020-10-29 DIAGNOSIS — L40.50 PSORIATIC ARTHROPATHY (H): ICD-10-CM

## 2020-10-29 LAB — RENIN PLAS-CCNC: 1.8 NG/ML/HR

## 2020-10-30 ENCOUNTER — RECORDS - HEALTHEAST (OUTPATIENT)
Dept: ADMINISTRATIVE | Facility: OTHER | Age: 65
End: 2020-10-30

## 2020-10-30 ENCOUNTER — HOSPITAL ENCOUNTER (OUTPATIENT)
Dept: MRI IMAGING | Facility: CLINIC | Age: 65
Discharge: HOME | End: 2020-10-30
Attending: INTERNAL MEDICINE | Admitting: INTERNAL MEDICINE
Payer: MEDICARE

## 2020-10-30 ENCOUNTER — COMMUNICATION - HEALTHEAST (OUTPATIENT)
Dept: BEHAVIORAL HEALTH | Facility: CLINIC | Age: 65
End: 2020-10-30

## 2020-10-30 ENCOUNTER — ANCILLARY PROCEDURE (OUTPATIENT)
Dept: MAMMOGRAPHY | Facility: CLINIC | Age: 65
End: 2020-10-30
Attending: INTERNAL MEDICINE
Payer: MEDICARE

## 2020-10-30 DIAGNOSIS — E87.6 HYPOKALEMIA: ICD-10-CM

## 2020-10-30 DIAGNOSIS — F41.1 ANXIETY STATE: ICD-10-CM

## 2020-10-30 DIAGNOSIS — R73.01 IMPAIRED FASTING GLUCOSE: Primary | ICD-10-CM

## 2020-10-30 DIAGNOSIS — E83.119 HEMOCHROMATOSIS, UNSPECIFIED HEMOCHROMATOSIS TYPE: ICD-10-CM

## 2020-10-30 DIAGNOSIS — E03.9 ACQUIRED HYPOTHYROIDISM: ICD-10-CM

## 2020-10-30 DIAGNOSIS — Z12.31 VISIT FOR SCREENING MAMMOGRAM: ICD-10-CM

## 2020-10-30 LAB
ANION GAP SERPL CALCULATED.3IONS-SCNC: 7 MMOL/L (ref 3–14)
BASOPHILS # BLD AUTO: 0 10E9/L (ref 0–0.2)
BASOPHILS NFR BLD AUTO: 0.4 %
BUN SERPL-MCNC: 11 MG/DL (ref 7–30)
CALCIUM SERPL-MCNC: 9.8 MG/DL (ref 8.5–10.1)
CHLORIDE SERPL-SCNC: 107 MMOL/L (ref 94–109)
CO2 SERPL-SCNC: 26 MMOL/L (ref 20–32)
CREAT SERPL-MCNC: 0.63 MG/DL (ref 0.52–1.04)
DIFFERENTIAL METHOD BLD: ABNORMAL
EOSINOPHIL # BLD AUTO: 0.1 10E9/L (ref 0–0.7)
EOSINOPHIL NFR BLD AUTO: 1.4 %
ERYTHROCYTE [DISTWIDTH] IN BLOOD BY AUTOMATED COUNT: 14.6 % (ref 10–15)
FERRITIN SERPL-MCNC: 20 NG/ML (ref 8–252)
GFR SERPL CREATININE-BSD FRML MDRD: >90 ML/MIN/{1.73_M2}
GLUCOSE SERPL-MCNC: 136 MG/DL (ref 70–99)
HBA1C MFR BLD: 6.1 % (ref 0–5.6)
HCT VFR BLD AUTO: 47.5 % (ref 35–47)
HGB BLD-MCNC: 15.4 G/DL (ref 11.7–15.7)
IMM GRANULOCYTES # BLD: 0 10E9/L (ref 0–0.4)
IMM GRANULOCYTES NFR BLD: 0.3 %
IRON SATN MFR SERPL: 15 % (ref 15–46)
IRON SERPL-MCNC: 52 UG/DL (ref 35–180)
LYMPHOCYTES # BLD AUTO: 1.2 10E9/L (ref 0.8–5.3)
LYMPHOCYTES NFR BLD AUTO: 16.5 %
MCH RBC QN AUTO: 29.1 PG (ref 26.5–33)
MCHC RBC AUTO-ENTMCNC: 32.4 G/DL (ref 31.5–36.5)
MCV RBC AUTO: 90 FL (ref 78–100)
MONOCYTES # BLD AUTO: 0.4 10E9/L (ref 0–1.3)
MONOCYTES NFR BLD AUTO: 5.5 %
NEUTROPHILS # BLD AUTO: 5.4 10E9/L (ref 1.6–8.3)
NEUTROPHILS NFR BLD AUTO: 75.9 %
NRBC # BLD AUTO: 0 10*3/UL
NRBC BLD AUTO-RTO: 0 /100
PLATELET # BLD AUTO: 228 10E9/L (ref 150–450)
POTASSIUM SERPL-SCNC: 4 MMOL/L (ref 3.4–5.3)
RBC # BLD AUTO: 5.29 10E12/L (ref 3.8–5.2)
SODIUM SERPL-SCNC: 140 MMOL/L (ref 133–144)
TIBC SERPL-MCNC: 334 UG/DL (ref 240–430)
TRANSFERRIN SERPL-MCNC: 253 MG/DL (ref 210–360)
TSH SERPL DL<=0.005 MIU/L-ACNC: 0.37 MU/L (ref 0.4–4)
WBC # BLD AUTO: 7.1 10E9/L (ref 4–11)

## 2020-10-30 PROCEDURE — 36415 COLL VENOUS BLD VENIPUNCTURE: CPT | Performed by: PATHOLOGY

## 2020-10-30 PROCEDURE — 83036 HEMOGLOBIN GLYCOSYLATED A1C: CPT | Performed by: PATHOLOGY

## 2020-10-30 PROCEDURE — 84443 ASSAY THYROID STIM HORMONE: CPT | Performed by: PATHOLOGY

## 2020-10-30 PROCEDURE — 77063 BREAST TOMOSYNTHESIS BI: CPT | Mod: GC

## 2020-10-30 PROCEDURE — 74181 MRI ABDOMEN W/O CONTRAST: CPT

## 2020-10-30 PROCEDURE — 84466 ASSAY OF TRANSFERRIN: CPT | Performed by: PATHOLOGY

## 2020-10-30 PROCEDURE — 82728 ASSAY OF FERRITIN: CPT | Performed by: PATHOLOGY

## 2020-10-30 PROCEDURE — 83540 ASSAY OF IRON: CPT | Performed by: PATHOLOGY

## 2020-10-30 PROCEDURE — 74181 MRI ABDOMEN W/O CONTRAST: CPT | Mod: 26 | Performed by: RADIOLOGY

## 2020-10-30 PROCEDURE — 85025 COMPLETE CBC W/AUTO DIFF WBC: CPT | Performed by: PATHOLOGY

## 2020-10-30 PROCEDURE — 83550 IRON BINDING TEST: CPT | Performed by: PATHOLOGY

## 2020-10-30 PROCEDURE — 77067 SCR MAMMO BI INCL CAD: CPT | Mod: GC

## 2020-10-30 PROCEDURE — 80048 BASIC METABOLIC PNL TOTAL CA: CPT | Performed by: PATHOLOGY

## 2020-11-02 ENCOUNTER — AMBULATORY - HEALTHEAST (OUTPATIENT)
Dept: LAB | Facility: CLINIC | Age: 65
End: 2020-11-02

## 2020-11-02 DIAGNOSIS — R79.89 ELEVATED PLASMA METANEPHRINES: ICD-10-CM

## 2020-11-02 DIAGNOSIS — E89.2 POSTABLATIVE HYPOPARATHYROIDISM (H): ICD-10-CM

## 2020-11-02 DIAGNOSIS — Z86.018 PERSONAL HISTORY OF PHEOCHROMOCYTOMA: ICD-10-CM

## 2020-11-02 LAB
CREAT 24H UR-MRATE: 0.8 G/24 HR (ref 0.8–1.8)
CREAT UR-MCNC: 29 MG/DL
SPECIMEN VOL UR: 2700 ML

## 2020-11-05 ENCOUNTER — COMMUNICATION - HEALTHEAST (OUTPATIENT)
Dept: INTERNAL MEDICINE | Facility: CLINIC | Age: 65
End: 2020-11-05

## 2020-11-05 ENCOUNTER — VIRTUAL VISIT (OUTPATIENT)
Dept: PSYCHOLOGY | Facility: CLINIC | Age: 65
End: 2020-11-05
Payer: MEDICARE

## 2020-11-05 DIAGNOSIS — F41.8 ANXIETY ASSOCIATED WITH DEPRESSION: ICD-10-CM

## 2020-11-05 DIAGNOSIS — F33.1 MDD (MAJOR DEPRESSIVE DISORDER), RECURRENT EPISODE, MODERATE (H): Primary | ICD-10-CM

## 2020-11-05 DIAGNOSIS — F41.1 GAD (GENERALIZED ANXIETY DISORDER): ICD-10-CM

## 2020-11-05 LAB
COLLECT DURATION TIME SPEC: 24 HR
CREAT 24H UR-MRATE: 837 MG/D (ref 500–1400)
CREAT UR-MCNC: 31 MG/DL
METANEPH 24H UR-MCNC: 17 UG/L
METANEPH 24H UR-MRATE: 46 UG/D (ref 36–229)
METANEPH+NORMETANEPH UR-IMP: NORMAL
METANEPH/CREAT 24H UR: 55 UG/G CRT (ref 0–300)
NORMETANEPHRINE 24H UR-MCNC: 69 UG/L
NORMETANEPHRINE 24H UR-MRATE: 186 UG/D (ref 95–650)
NORMETANEPHRINE/CREAT 24H UR: 223 UG/G CRT (ref 0–400)
SPECIMEN VOL ?TM UR: 2700 ML

## 2020-11-05 PROCEDURE — 90834 PSYTX W PT 45 MINUTES: CPT | Mod: 95 | Performed by: SOCIAL WORKER

## 2020-11-05 PROCEDURE — 90785 PSYTX COMPLEX INTERACTIVE: CPT | Mod: 95 | Performed by: SOCIAL WORKER

## 2020-11-05 NOTE — PROGRESS NOTES
Progress Note    Patient Name: Randi Cleary  Date: 11/5/2020         Service Type: Individual      Session Start Time: 11:37 AM  Session End Time: 12:25 PM     Session Length: 48 minutes    Session #: 17    Attendees: Client attended alone    Service Modality:  Video Visit:    Telemedicine Visit: The patient's condition can be safely assessed and treated via synchronous audio and visual telemedicine encounter.      Reason for Telemedicine Visit: Services only offered telehealth    Originating Site (Patient Location): Patient's home    Distant Site (Provider Location): Provider Remote Setting    Consent:  The patient/guardian has verbally consented to: the potential risks and benefits of telemedicine (video visit) versus in person care; bill my insurance or make self-payment for services provided; and responsibility for payment of non-covered services.     Mode of Communication:  Video Conference via [employer]    As the provider I attest to compliance with applicable laws and regulations related to telemedicine.      Treatment Plan Last Reviewed: 9/16/20  PHQ-9 / CHAVO-7 :   PHQ 9/23/2020 10/6/2020 10/27/2020   PHQ-9 Total Score 17 18 20   Q9: Thoughts of better off dead/self-harm past 2 weeks Several days More than half the days Several days   F/U: Thoughts of suicide or self-harm - - Yes   F/U: Self harm-plan - - No   F/U: Self-harm action - - No   F/U: Safety concerns - - No     CHAVO-7 SCORE 9/23/2020 10/6/2020 10/27/2020   Total Score - - 17 (severe anxiety)   Total Score 19 17 17         DATA  Interactive Complexity: Yes, visit entailed Interactive Complexity evidenced by:  -The need to manage maladaptive communication (related to, e.g., high anxiety, high reactivity, repeated questions, or disagreement) among participants that complicates delivery of care  Crisis: No       Progress Since Last Session (Related to Symptoms / Goals / Homework):   Symptoms: Patient reports  "having some difficult days to the point [facility], but is now feeling more stable    Homework: Partially completed  make an appointment with Dr. Dubois to get on antidepressants -done  have  help get a bed -unable to assess due to mention of safety concerns  call the hospital about the urine test -unable to assess due to mention of safety concerns       Episode of Care Goals: Minimal progress - ACTION (Actively working towards change); Intervened by reinforcing change plan / affirming steps taken     Current / Ongoing Stressors and Concerns:   Patient is currently socially isolated. She has a conflictual relationship with her .  She is getting minimal physical activity.      Treatment Objective(s) Addressed in This Session:   Patient will increase frequency of engaging her in ADLs.  Patient will track and record at least 5 pleasant exchanges with . Patient will be able to identify at least 5 positive traits about her .  Patient will reduce level of depressive and anxious features as evidenced by reduction in score on her CHAVO-7 and PHQ-9 (scores of 15 and 16 at first measurment, respectively).     Intervention:   Person-Centered Therapy: Patient shared what a tough week the previous week was and how she ended up canceling her surgery again.  Shared her frustrations with her CPAP machine and her concern that it may be getting her sick.  Encouraged her to talk to her doctor. Also talked to her about how it is a lot of time and effort to clean it regularly, but talked about how this is worth it.  Talked about medication change for mood.     Discussed she would \"go manic\" in the past and spend all of her money. She reports feeling like it is now more anger than impulsive spending. Discussed that she has wondered if she has bipolar disorder and asked her previous doctor about it. Patient then shared that she has felt so " "overwhelmed and unable to regulate that her  asked if he needed to call an ambulance. She decided against this.  When asked about safety she shared that \"I can't continue like this\" and stated she had driven around.  She did not clarify further, but did state that she is feeling better now and is not planning to harm herself. Talked about how medications may be helping with stability. Agreed that due to challenges with mood and potential safety that adding in another session would be helpful. Also discussed possible higher levels of care, including 55+ day treatment.  Provided patient with [facility] Crisis number and discussed how this can be helpful, asked that she share this with her .    ASSESSMENT: Current Emotional / Mental Status (status of significant symptoms):   Risk status (Self / Other harm or suicidal ideation)   Patient denies current fears or concerns for personal safety.   Patient reports the following current or recent suicidal ideation or behaviors: \"I can't continue like this.\".   Patient denies current or recent homicidal ideation or behaviors.   Patient denies current or recent self injurious behavior or ideation.   Patient denies other safety concerns.   Patient reports there has been no change in risk factors since their last session.     Patient reports there has been no change in protective factors since their last session.     Recommended that patient call 911 or go to the local ED should there be a change in any of these risk factors. [facility] Crisis Number was provided     Appearance:   Appropriate    Eye Contact:   Fair    Psychomotor Behavior: Normal    Attitude:   Cooperative    Orientation:   All   Speech    Rate / Production: Normal/ Responsive    Volume:  Normal    Mood:    Sad  Overwhelmed   Affect:    Worrisome    Thought Content:  Rumination    Thought Form:  Tangential    Insight:    Fair      Medication Review:   Changes to psychiatric medications, " "see updated Medication List in EPIC.      Medication Compliance:   Yes     Changes in Health Issues:   None reported     Chemical Use Review:   Substance Use: Chemical use reviewed, no active concerns identified      Tobacco Use: No current tobacco use.      Diagnosis:  1. MDD (major depressive disorder), recurrent episode, moderate (H)    2. Anxiety associated with depression    3. CHAVO (generalized anxiety disorder)      Collateral Reports Completed:   Not Applicable    PLAN: (Patient Tasks / Therapist Tasks / Other)  Add in extra appointment  Make full safety plan at next session.  Consider higher level of care.  Use [facility] Crisis Team if needed: [phone], share this number with           MICAELA SLADE JO    2020                                                         ______________________________________________________________________    Treatment Plan    Patient's Name: Randi Cleary  YOB: 1955    Date: 2020    DSM5 Diagnoses: 296.32 (F33.1) Major Depressive Disorder, Recurrent Episode, Moderate With anxious distress  Psychosocial / Contextual Factors: Patient's entire family of origin has , she now has a sister-in-law and  as support.  Relationship with  is conflictual.  Patient is reporting increase in physical symptoms due to COVID-19.  Patient would like to get off of pain medications.  WHODAS: 42    Referral / Collaboration:  Referral to another professional/service is not indicated at this time.    Anticipated number of session or this episode of care: 12-15      MeasurableTreatment Goal(s) related to diagnosis / functional impairment(s)  Goal 1: Patient will \"jumpstart, getting going with the things I need to be doing around the house as far as picking up, doing things, trying to do something every day.  Also to lessen the animosity between me and my .\"    I will know I've met my goal when my shoulders are fixed and I " can see.      Objective #A (Patient Action)    Patient will increase frequency of engaging her in ADLs.  Status: Continued - Date(s): 9/16/20    Intervention(s)  Therapist will engage patient in CBT, specifically behavioral activation.    Objective #B  Patient will track and record at least 5 pleasant exchanges with . Patient will be able to identify at least 5 positive traits about her . and how he relates to her.  Status: Continued - Date(s): 9/16/20    Intervention(s)  Therapist will teach assertiveness skills and assign homework related to relationship interactions.    Objective #C  Patient will reduce level of depressive and anxious features as evidenced by reduction in score on her CHAVO-7 and PHQ-9 (scores of 15 and 16 at first measurment, respectively).  Status: Continued - Date(s): 9/16/20    Intervention(s)  Therapist will engage patient in person-centered therapy and CBT.    Patient has reviewed and agreed to the above plan.      MICAELA SLADE  September 16, 2020

## 2020-11-05 NOTE — PATIENT INSTRUCTIONS
Consider higher level of care.  Use [facility] Crisis Team if needed: [phone], share this number with

## 2020-11-09 ENCOUNTER — VIRTUAL VISIT (OUTPATIENT)
Dept: ONCOLOGY | Facility: CLINIC | Age: 65
End: 2020-11-09
Attending: INTERNAL MEDICINE
Payer: MEDICARE

## 2020-11-09 ENCOUNTER — RECORDS - HEALTHEAST (OUTPATIENT)
Dept: ADMINISTRATIVE | Facility: OTHER | Age: 65
End: 2020-11-09

## 2020-11-09 DIAGNOSIS — E83.119 HEMOCHROMATOSIS, UNSPECIFIED HEMOCHROMATOSIS TYPE: Primary | ICD-10-CM

## 2020-11-09 PROCEDURE — 999N001193 HC VIDEO/TELEPHONE VISIT; NO CHARGE

## 2020-11-09 PROCEDURE — 99214 OFFICE O/P EST MOD 30 MIN: CPT | Mod: 95 | Performed by: INTERNAL MEDICINE

## 2020-11-09 NOTE — LETTER
"    11/9/2020         RE: Randi Cleary  [address]  [place] [address]        Dear Colleague,    Thank you for referring your patient, Randi Cleary, to the [facility]. Please see a copy of my visit note below.    Randi Cleary is a 65 year old female who is being evaluated via a billable video visit.      The patient has been notified of following:     \"This video visit will be conducted via a call between you and your physician/provider. We have found that certain health care needs can be provided without the need for an in-person physical exam.  This service lets us provide the care you need with a video conversation.  If a prescription is necessary we can send it directly to your pharmacy.  If lab work is needed we can place an order for that and you can then stop by our lab to have the test done at a later time.    Video visits are billed at different rates depending on your insurance coverage.  Please reach out to your insurance provider with any questions.    If during the course of the call the physician/provider feels a video visit is not appropriate, you will not be charged for this service.\"    Patient has given verbal consent for Video visit? Yes  How would you like to obtain your AVS? [employer]  If you are dropped from the video visit, the video invite should be resent to: Text to cell phone: [phone]  Will anyone else be joining your video visit? No      I have reviewed and updated the patient's allergies and medication list.    Concerns: Would like MRI results released to [employer].   Refills: None needed.       Vitals - Patient Reported  Weight (Patient Reported): 108.9 kg (240 lb)  Height (Patient Reported): 167.6 cm (5' 6\")  BMI (Based on Pt Reported Ht/Wt): 38.74  Pain Score: No Pain (0)      Flower Sánchez CMA      Video-Visit Details    Type of service:  Video Visit    Video Start Time: 1:29 PM  Video End Time:1:51 PM    Originating " Location (pt. Location): Home    Distant Location (provider location):  [facility]     Platform used for Video Visit: Tara Hill MD

## 2020-11-09 NOTE — PROGRESS NOTES
"Randi Cleary is a 65 year old female who is being evaluated via a billable video visit.      The patient has been notified of following:     \"This video visit will be conducted via a call between you and your physician/provider. We have found that certain health care needs can be provided without the need for an in-person physical exam.  This service lets us provide the care you need with a video conversation.  If a prescription is necessary we can send it directly to your pharmacy.  If lab work is needed we can place an order for that and you can then stop by our lab to have the test done at a later time.    Video visits are billed at different rates depending on your insurance coverage.  Please reach out to your insurance provider with any questions.    If during the course of the call the physician/provider feels a video visit is not appropriate, you will not be charged for this service.\"    Patient has given verbal consent for Video visit? Yes  How would you like to obtain your AVS? [employer]  If you are dropped from the video visit, the video invite should be resent to: Text to cell phone: [phone]  Will anyone else be joining your video visit? No      I have reviewed and updated the patient's allergies and medication list.    Concerns: Would like MRI results released to [employer].   Refills: None needed.       Vitals - Patient Reported  Weight (Patient Reported): 108.9 kg (240 lb)  Height (Patient Reported): 167.6 cm (5' 6\")  BMI (Based on Pt Reported Ht/Wt): 38.74  Pain Score: No Pain (0)      Flower Sánchez CMA      Video-Visit Details    Type of service:  Video Visit    Video Start Time: 1:29 PM  Video End Time:1:51 PM    Originating Location (pt. Location): Home    Distant Location (provider location):  [facility]     Platform used for Video Visit: Tara Hill MD        "

## 2020-11-10 ENCOUNTER — VIRTUAL VISIT (OUTPATIENT)
Dept: PSYCHOLOGY | Facility: CLINIC | Age: 65
End: 2020-11-10
Payer: MEDICARE

## 2020-11-10 DIAGNOSIS — F33.1 MAJOR DEPRESSIVE DISORDER, RECURRENT EPISODE, MODERATE WITH ANXIOUS DISTRESS (H): Primary | ICD-10-CM

## 2020-11-10 DIAGNOSIS — F41.1 GAD (GENERALIZED ANXIETY DISORDER): ICD-10-CM

## 2020-11-10 PROCEDURE — 90834 PSYTX W PT 45 MINUTES: CPT | Mod: 95 | Performed by: SOCIAL WORKER

## 2020-11-10 PROCEDURE — 90785 PSYTX COMPLEX INTERACTIVE: CPT | Mod: 95 | Performed by: SOCIAL WORKER

## 2020-11-10 ASSESSMENT — ANXIETY QUESTIONNAIRES
4. TROUBLE RELAXING: NEARLY EVERY DAY
6. BECOMING EASILY ANNOYED OR IRRITABLE: NEARLY EVERY DAY
3. WORRYING TOO MUCH ABOUT DIFFERENT THINGS: NEARLY EVERY DAY
7. FEELING AFRAID AS IF SOMETHING AWFUL MIGHT HAPPEN: MORE THAN HALF THE DAYS
GAD7 TOTAL SCORE: 20
1. FEELING NERVOUS, ANXIOUS, OR ON EDGE: NEARLY EVERY DAY
2. NOT BEING ABLE TO STOP OR CONTROL WORRYING: NEARLY EVERY DAY
5. BEING SO RESTLESS THAT IT IS HARD TO SIT STILL: NEARLY EVERY DAY

## 2020-11-10 ASSESSMENT — PATIENT HEALTH QUESTIONNAIRE - PHQ9: SUM OF ALL RESPONSES TO PHQ QUESTIONS 1-9: 22

## 2020-11-10 NOTE — PROGRESS NOTES
Progress Note    Patient Name: Randi Cleary  Date: 11/10/2020         Service Type: Individual      Session Start Time: 9:31 AM  Session End Time: 10:21 AM     Session Length: 50 minutes    Session #: 18    Attendees: Client attended alone    Service Modality:  Video Visit:    Telemedicine Visit: The patient's condition can be safely assessed and treated via synchronous audio and visual telemedicine encounter.      Reason for Telemedicine Visit: Services only offered telehealth    Originating Site (Patient Location): Patient's home    Distant Site (Provider Location): Provider Remote Setting    Consent:  The patient/guardian has verbally consented to: the potential risks and benefits of telemedicine (video visit) versus in person care; bill my insurance or make self-payment for services provided; and responsibility for payment of non-covered services.     Mode of Communication:  Video Conference via [employer]    As the provider I attest to compliance with applicable laws and regulations related to telemedicine.      Treatment Plan Last Reviewed: 9/16/20  PHQ-9 / CHAVO-7 :   PHQ 10/6/2020 10/27/2020 11/10/2020   PHQ-9 Total Score 18 20 22   Q9: Thoughts of better off dead/self-harm past 2 weeks More than half the days Several days More than half the days   F/U: Thoughts of suicide or self-harm - Yes -   F/U: Self harm-plan - No -   F/U: Self-harm action - No -   F/U: Safety concerns - No -     CHAVO-7 SCORE 10/6/2020 10/27/2020 11/10/2020   Total Score - 17 (severe anxiety) -   Total Score 17 17 20         DATA  Interactive Complexity: Yes, visit entailed Interactive Complexity evidenced by:  -The need to manage maladaptive communication (related to, e.g., high anxiety, high reactivity, repeated questions, or disagreement) among participants that complicates delivery of care  Crisis: No       Progress Since Last Session (Related to Symptoms / Goals /  Homework):   Symptoms: Worsening patient reports feeling like she is stuck, ruminating in past things    Homework: Partially completed  Consider higher level of care. -done  Use [facility] Crisis Team if needed: [phone], share this number with  -not done       Episode of Care Goals: Minimal progress - ACTION (Actively working towards change); Intervened by reinforcing change plan / affirming steps taken     Current / Ongoing Stressors and Concerns:   Patient is currently socially isolated. She has a conflictual relationship with her .  She is getting minimal physical activity.      Treatment Objective(s) Addressed in This Session:   Patient will increase frequency of engaging her in ADLs.  Patient will track and record at least 5 pleasant exchanges with . Patient will be able to identify at least 5 positive traits about her .  Patient will reduce level of depressive and anxious features as evidenced by reduction in score on her CHAVO-7 and PHQ-9 (scores of 15 and 16 at first measurment, respectively).     Intervention:   Person-Centered Therapy: Patient started sharing her frustration with feeling stuck. She reports that she starts thinking about family again and then gets into arguments with her . Discussed safety, talked about how it is passive suicidal ideation and she experienced this a lot when younger, but hasn't experienced it in a long time.      Discussed her concerns about bipolar disorder. Talked about what this meant and she was saying she might have some grandiose ideas, and some impulsive spending. She wonders whether the out of control anger could be related to a possible bipolar diagnosis. Talked about how her anger often happens when she feels she is lacking control and how this could also be related to her anxiety.      Talked about next steps, including talking to her doctor who is prescribing her psychotropic medications, as well as looking at higher  "levels of care.  Patient is interested in 55+ day treatment group, but has questions about billing.  Agreed to help get her connected with someone to get her some answers.     ASSESSMENT: Current Emotional / Mental Status (status of significant symptoms):   Risk status (Self / Other harm or suicidal ideation)   Patient denies current fears or concerns for personal safety.   Patient reports the following current or recent suicidal ideation or behaviors: \"I can't go on like this anymore.\".She reports these are passive suicidal thoughts.    Patient denies current or recent homicidal ideation or behaviors.   Patient denies current or recent self injurious behavior or ideation.   Patient denies other safety concerns.   Patient reports there has been no change in risk factors since their last session.     Patient reports there has been no change in protective factors since their last session.     Recommended that patient call 911 or go to the local ED should there be a change in any of these risk factors. [facility] Crisis Number was provided     Appearance:   Appropriate    Eye Contact:   Fair    Psychomotor Behavior: Normal    Attitude:   Cooperative    Orientation:   All   Speech    Rate / Production: Normal/ Responsive    Volume:  Normal    Mood:    Sad  Overwhelmed   Affect:    Tearful Worrisome    Thought Content:  Rumination    Thought Form:  Tangential    Insight:    Fair      Medication Review:   No changes to current psychiatric medication(s)     Medication Compliance:   Yes     Changes in Health Issues:   None reported     Chemical Use Review:   Substance Use: Chemical use reviewed, no active concerns identified      Tobacco Use: No current tobacco use.      Diagnosis:  1. Major depressive disorder, recurrent episode, moderate with anxious distress (H)    2. CHAVO (generalized anxiety disorder)      Collateral Reports Completed:   Not Applicable    PLAN: (Patient Tasks / Therapist Tasks / Other)  Provider " "to help patient connect to 55+ about billing questions    Make full safety plan at next session.  Use [facility] Crisis Team if needed: [phone], share this number with           MICAELA SLADE    November 10, 2020                                                         ______________________________________________________________________    Treatment Plan    Patient's Name: Randi Cleary  YOB: 1955    Date: 2020    DSM5 Diagnoses: 296.32 (F33.1) Major Depressive Disorder, Recurrent Episode, Moderate With anxious distress  Psychosocial / Contextual Factors: Patient's entire family of origin has , she now has a sister-in-law and  as support.  Relationship with  is conflictual.  Patient is reporting increase in physical symptoms due to COVID-19.  Patient would like to get off of pain medications.  WHODAS: 42    Referral / Collaboration:  Referral to another professional/service is not indicated at this time.    Anticipated number of session or this episode of care: 12-15      MeasurableTreatment Goal(s) related to diagnosis / functional impairment(s)  Goal 1: Patient will \"jumpstart, getting going with the things I need to be doing around the house as far as picking up, doing things, trying to do something every day.  Also to lessen the animosity between me and my .\"    I will know I've met my goal when my shoulders are fixed and I can see.      Objective #A (Patient Action)    Patient will increase frequency of engaging her in ADLs.  Status: Continued - Date(s): 20    Intervention(s)  Therapist will engage patient in CBT, specifically behavioral activation.    Objective #B  Patient will track and record at least 5 pleasant exchanges with . Patient will be able to identify at least 5 positive traits about her . and how he relates to her.  Status: Continued - Date(s): 20    Intervention(s)  Therapist will teach assertiveness " skills and assign homework related to relationship interactions.    Objective #C  Patient will reduce level of depressive and anxious features as evidenced by reduction in score on her CHAVO-7 and PHQ-9 (scores of 15 and 16 at first measurment, respectively).  Status: Continued - Date(s): 9/16/20    Intervention(s)  Therapist will engage patient in person-centered therapy and CBT.    Patient has reviewed and agreed to the above plan.      B MICAELA BAKER  September 16, 2020

## 2020-11-11 ASSESSMENT — ANXIETY QUESTIONNAIRES: GAD7 TOTAL SCORE: 20

## 2020-11-13 ENCOUNTER — COMMUNICATION - HEALTHEAST (OUTPATIENT)
Dept: INTERNAL MEDICINE | Facility: CLINIC | Age: 65
End: 2020-11-13

## 2020-11-18 ENCOUNTER — COMMUNICATION - HEALTHEAST (OUTPATIENT)
Dept: PALLIATIVE MEDICINE | Facility: OTHER | Age: 65
End: 2020-11-18

## 2020-11-18 ENCOUNTER — TELEPHONE (OUTPATIENT)
Dept: PSYCHOLOGY | Facility: CLINIC | Age: 65
End: 2020-11-18

## 2020-11-18 ENCOUNTER — VIRTUAL VISIT (OUTPATIENT)
Dept: PSYCHOLOGY | Facility: CLINIC | Age: 65
End: 2020-11-18
Payer: MEDICARE

## 2020-11-18 DIAGNOSIS — F41.1 GAD (GENERALIZED ANXIETY DISORDER): ICD-10-CM

## 2020-11-18 DIAGNOSIS — F41.1 ANXIETY STATE: ICD-10-CM

## 2020-11-18 DIAGNOSIS — F33.1 MAJOR DEPRESSIVE DISORDER, RECURRENT EPISODE, MODERATE WITH ANXIOUS DISTRESS (H): Primary | ICD-10-CM

## 2020-11-18 PROCEDURE — 90834 PSYTX W PT 45 MINUTES: CPT | Mod: 95 | Performed by: SOCIAL WORKER

## 2020-11-18 PROCEDURE — 90785 PSYTX COMPLEX INTERACTIVE: CPT | Mod: 95 | Performed by: SOCIAL WORKER

## 2020-11-18 NOTE — TELEPHONE ENCOUNTER
Patient left two messages, at 2:47 and 3:11, stating that she needed provider to call her as she was not doing well and needed help figuring out what to do.

## 2020-11-18 NOTE — TELEPHONE ENCOUNTER
3:45 PM Patient shared that she was feeling overwhelmed. She reported not being able to get in to see her prescriber and needing an answer about what to do to stablize her mood as she couldn't continue feeling as she was.  When asked about whether she had thoughts about hurting herself, she stated yes, but she didn't want to talk about that.  Discussed going to the hospital to get stabilized, patient was adament that she did not want to go to [facility], but was willing to go somewhere that writer suggested. Patient agreed that it was a good idea to go to [place], but stated she wanted to speak further to her  about this and asked that provider call her back in 45 minutes to an hour.  Let patient know that if she could not be reached provider would be calling her  due to potential safety concerns.    4:45 PM Patient reported she had spoken to her . Patient also shared her prescriber had called. She now has a plan for medication changes and feels more hopeful. She reported that she does not feel like hurting herself, knows her coping skills, and has the crisis number. She agreed that she and and  would call the crisis number or go to the ER if safety concerns arose again.  Patient agreed she would reach out again tomorrow if she was still struggling.

## 2020-11-18 NOTE — PROGRESS NOTES
Progress Note    Patient Name: Randi Cleary  Date: 11/18/2020         Service Type: Individual      Session Start Time: 11:00 AM  Session End Time: 11:51 AM     Session Length: 51 minutes    Session #: 19    Attendees: Client attended alone    Service Modality:  Video Visit:    Telemedicine Visit: The patient's condition can be safely assessed and treated via synchronous audio and visual telemedicine encounter.      Reason for Telemedicine Visit: Services only offered telehealth    Originating Site (Patient Location): Patient's home    Distant Site (Provider Location): Provider Remote Setting    Consent:  The patient/guardian has verbally consented to: the potential risks and benefits of telemedicine (video visit) versus in person care; bill my insurance or make self-payment for services provided; and responsibility for payment of non-covered services.     Mode of Communication:  Video Conference via [employer]    As the provider I attest to compliance with applicable laws and regulations related to telemedicine.      Treatment Plan Last Reviewed: 9/16/20  PHQ-9 / CHAVO-7 :   PHQ 10/6/2020 10/27/2020 11/10/2020   PHQ-9 Total Score 18 20 22   Q9: Thoughts of better off dead/self-harm past 2 weeks More than half the days Several days More than half the days   F/U: Thoughts of suicide or self-harm - Yes -   F/U: Self harm-plan - No -   F/U: Self-harm action - No -   F/U: Safety concerns - No -     CHAVO-7 SCORE 10/6/2020 10/27/2020 11/10/2020   Total Score - 17 (severe anxiety) -   Total Score 17 17 20         DATA  Interactive Complexity: Yes, visit entailed Interactive Complexity evidenced by:  -The need to manage maladaptive communication (related to, e.g., high anxiety, high reactivity, repeated questions, or disagreement) among participants that complicates delivery of care  Crisis: No       Progress Since Last Session (Related to Symptoms / Goals /  Homework):   Symptoms: Worsening Patient is feeling overwhelmed and uncertain of how to proceed    Homework: Partially completed  Consider higher level of care. -done  Use [facility] Crisis Team if needed: [phone], share this number with  -not done       Episode of Care Goals: Minimal progress - ACTION (Actively working towards change); Intervened by reinforcing change plan / affirming steps taken     Current / Ongoing Stressors and Concerns:   Patient is currently socially isolated. She has a conflictual relationship with her .  She is getting minimal physical activity.      Treatment Objective(s) Addressed in This Session:   Patient will increase frequency of engaging her in ADLs.  Patient will track and record at least 5 pleasant exchanges with . Patient will be able to identify at least 5 positive traits about her .  Patient will reduce level of depressive and anxious features as evidenced by reduction in score on her CHAVO-7 and PHQ-9 (scores of 15 and 16 at first measurment, respectively).     Intervention:   Person-Centered Therapy: Discussed frustrations with doctors and coordinating care. Discussed ongoing health issues. Patient is concerned that her medications may be interacting and making her mood more unstable.  She thinks they are also causing physical symptoms that are hard for her to deal with, such as restless legs.  When asked about safety patient shared that she doesn't feel like she can go on like this and just wants the problem to stop.  She did not have any active suicidal ideation or plan, but did experience passive suicidal ideation.  Spent the remainder of the session making a plan of how to address her health issues so that she regains hope. Reviewed the crisis number, but there was not enough time to create a full crisis plan outside of dealing with her current health and medication concerns.      ASSESSMENT: Current Emotional / Mental Status (status  "of significant symptoms):   Risk status (Self / Other harm or suicidal ideation)   Patient denies current fears or concerns for personal safety.   Patient reports the following current or recent suicidal ideation or behaviors: \"I can't go on like this anymore.\".She reports these are passive suicidal thoughts.    Patient denies current or recent homicidal ideation or behaviors.   Patient denies current or recent self injurious behavior or ideation.   Patient denies other safety concerns.   Patient reports there has been no change in risk factors since their last session.     Patient reports there has been no change in protective factors since their last session.     Recommended that patient call 911 or go to the local ED should there be a change in any of these risk factors. [facility] Crisis Number was provided     Appearance:   Appropriate    Eye Contact:   Fair    Psychomotor Behavior: Normal    Attitude:   Cooperative    Orientation:   All   Speech    Rate / Production: Normal/ Responsive    Volume:  Normal    Mood:    Sad  Overwhelmed   Affect:    Tearful Worrisome    Thought Content:  Rumination    Thought Form:  Tangential    Insight:    Fair      Medication Review:   No changes to current psychiatric medication(s)     Medication Compliance:   Yes     Changes in Health Issues:   None reported     Chemical Use Review:   Substance Use: Chemical use reviewed, no active concerns identified      Tobacco Use: No current tobacco use.      Diagnosis:  1. Major depressive disorder, recurrent episode, moderate with anxious distress (H)    2. CHAVO (generalized anxiety disorder)      Collateral Reports Completed:   Not Applicable    PLAN: (Patient Tasks / Therapist Tasks / Other)  Call Dr. Dubois    Make full safety plan at next session.  Use [facility] Crisis Team if needed: [phone], share this number with           MICAELA SLADE JO    November 18, 2020                                          " "               ______________________________________________________________________    Treatment Plan    Patient's Name: Randi Cleary  YOB: 1955    Date: 2020    DSM5 Diagnoses: 296.32 (F33.1) Major Depressive Disorder, Recurrent Episode, Moderate With anxious distress  Psychosocial / Contextual Factors: Patient's entire family of origin has , she now has a sister-in-law and  as support.  Relationship with  is conflictual.  Patient is reporting increase in physical symptoms due to COVID-19.  Patient would like to get off of pain medications.  WHODAS: 42    Referral / Collaboration:  Referral to another professional/service is not indicated at this time.    Anticipated number of session or this episode of care: 12-15      MeasurableTreatment Goal(s) related to diagnosis / functional impairment(s)  Goal 1: Patient will \"jumpstart, getting going with the things I need to be doing around the house as far as picking up, doing things, trying to do something every day.  Also to lessen the animosity between me and my .\"    I will know I've met my goal when my shoulders are fixed and I can see.      Objective #A (Patient Action)    Patient will increase frequency of engaging her in ADLs.  Status: Continued - Date(s): 20    Intervention(s)  Therapist will engage patient in CBT, specifically behavioral activation.    Objective #B  Patient will track and record at least 5 pleasant exchanges with . Patient will be able to identify at least 5 positive traits about her . and how he relates to her.  Status: Continued - Date(s): 20    Intervention(s)  Therapist will teach assertiveness skills and assign homework related to relationship interactions.    Objective #C  Patient will reduce level of depressive and anxious features as evidenced by reduction in score on her CHAVO-7 and PHQ-9 (scores of 15 and 16 at first measurment, respectively).  Status: Continued " - Date(s): 9/16/20    Intervention(s)  Therapist will engage patient in person-centered therapy and CBT.    Patient has reviewed and agreed to the above plan.      MICAELA SLADE  September 16, 2020

## 2020-11-20 ENCOUNTER — COMMUNICATION - HEALTHEAST (OUTPATIENT)
Dept: INTERNAL MEDICINE | Facility: CLINIC | Age: 65
End: 2020-11-20

## 2020-11-20 DIAGNOSIS — R60.9 EDEMA: ICD-10-CM

## 2020-11-20 DIAGNOSIS — I27.20 PULMONARY HYPERTENSION (H): ICD-10-CM

## 2020-11-20 DIAGNOSIS — R06.02 SOB (SHORTNESS OF BREATH): ICD-10-CM

## 2020-11-24 ENCOUNTER — OFFICE VISIT - HEALTHEAST (OUTPATIENT)
Dept: INTERNAL MEDICINE | Facility: CLINIC | Age: 65
End: 2020-11-24

## 2020-11-24 ENCOUNTER — VIRTUAL VISIT (OUTPATIENT)
Dept: PSYCHOLOGY | Facility: CLINIC | Age: 65
End: 2020-11-24
Payer: MEDICARE

## 2020-11-24 DIAGNOSIS — Z23 NEED FOR TETANUS BOOSTER: ICD-10-CM

## 2020-11-24 DIAGNOSIS — E89.0 POSTABLATIVE HYPOTHYROIDISM: ICD-10-CM

## 2020-11-24 DIAGNOSIS — G89.29 CHRONIC INTRACTABLE PAIN: ICD-10-CM

## 2020-11-24 DIAGNOSIS — F33.1 MAJOR DEPRESSIVE DISORDER, RECURRENT EPISODE, MODERATE WITH ANXIOUS DISTRESS (H): Primary | ICD-10-CM

## 2020-11-24 DIAGNOSIS — E03.9 ACQUIRED HYPOTHYROIDISM: ICD-10-CM

## 2020-11-24 DIAGNOSIS — E11.9 TYPE 2 DIABETES MELLITUS WITHOUT COMPLICATION, WITHOUT LONG-TERM CURRENT USE OF INSULIN (H): ICD-10-CM

## 2020-11-24 DIAGNOSIS — M94.9 DISORDER OF BONE AND CARTILAGE: ICD-10-CM

## 2020-11-24 DIAGNOSIS — K63.5 INTESTINAL POLYPS: ICD-10-CM

## 2020-11-24 DIAGNOSIS — F33.2 SEVERE EPISODE OF RECURRENT MAJOR DEPRESSIVE DISORDER, WITHOUT PSYCHOTIC FEATURES (H): ICD-10-CM

## 2020-11-24 DIAGNOSIS — E83.110 HEREDITARY HEMOCHROMATOSIS (H): ICD-10-CM

## 2020-11-24 DIAGNOSIS — M89.9 DISORDER OF BONE AND CARTILAGE: ICD-10-CM

## 2020-11-24 DIAGNOSIS — F41.1 GAD (GENERALIZED ANXIETY DISORDER): ICD-10-CM

## 2020-11-24 DIAGNOSIS — Z23 NEED FOR PNEUMOCOCCAL VACCINATION: ICD-10-CM

## 2020-11-24 DIAGNOSIS — E66.01 MORBID OBESITY (H): ICD-10-CM

## 2020-11-24 DIAGNOSIS — J44.9 CHRONIC OBSTRUCTIVE PULMONARY DISEASE, UNSPECIFIED COPD TYPE (H): ICD-10-CM

## 2020-11-24 PROCEDURE — 90837 PSYTX W PT 60 MINUTES: CPT | Mod: 95 | Performed by: SOCIAL WORKER

## 2020-11-24 RX ORDER — FUROSEMIDE 20 MG
40 TABLET ORAL DAILY
Qty: 180 TABLET | Refills: 2 | Status: SHIPPED | OUTPATIENT
Start: 2020-11-24 | End: 2021-07-22

## 2020-11-24 NOTE — PROGRESS NOTES
"                                           Randi RIVERA Cathy Evin     SAFETY PLAN:  Step 1: Warning signs / cues (Thoughts, images, mood, situation, behavior) that a crisis may be developing:    Thoughts: \"I don't want to continue\" \"I am unwanted\"    Images: none    Thinking Processes: ruminating    Mood: anger    Behaviors: isolating/withdrawing , can't stop crying, not taking care of myself and not taking care of my responsibilities    Situations: small triggers, such as not being able to find something, or dropping something   Step 2: Coping strategies - Things I can do to take my mind off of my problems without contacting another person (relaxation technique, physical activity):    Distress Tolerance Strategies:  arts and crafts: drawing, play with my pet , listen to positive and upbeat music: any, change body temperature (ice pack/cold water)  and paced breathing/progressive muscle relaxation    Physical Activities: go for a walk, deep breathing and stretching     Focus on helpful thoughts:  \"You've been through this before, you can get through it again.\"  Step 3: People and social settings that provide distraction:   Name: Carmen    Name: Darien    Name: Aleida     pool, shopping, Carmen's house, [employer]   Step 4: Remind myself of people and things that are important to me and worth living for:  Clifford Little Donna, post-COVID world, options of what could be in your future      Step 5: When I am in crisis, I can ask these people to help me use my safety plan:   Name: Sidney  Step 6: Making the environment safe:     go to sleep/daydream  Step 7: Professionals or agencies I can contact during a crisis:    [facility] Daytime Number: [phone]    Suicide Prevention Lifeline: 3-958-145-TALK (8278)    Crisis Text Line Service (available 24 hours a day, 7 days a week): Text [place] to 112471  Local Crisis Services: [facility] Crisis: [phone]    Call 911 or go to my nearest emergency department.   I " helped develop this safety plan and agree to use it when needed.  I have been given a copy of this plan.      Client signature _________________________________________________________________  Today s date:  11/24/2020  Adapted from Safety Plan Template 2008 Randi Poole and Robby Barba is reprinted with the express permission of the authors.  No portion of the Safety Plan Template may be reproduced without the express, written permission.  You can contact the authors at [email or web address] or [email or web address].                                                       Progress Note    Patient Name: Randi Cleary  Date: 11/24/2020         Service Type: Individual      Session Start Time: 10:26 AM  Session End Time: 11:22 AM     Session Length: 56 minutes (due to safety planning)    Session #: 19    Attendees: Client attended alone    Service Modality:  Video Visit:    Telemedicine Visit: The patient's condition can be safely assessed and treated via synchronous audio and visual telemedicine encounter.      Reason for Telemedicine Visit: Services only offered telehealth    Originating Site (Patient Location): Patient's home    Distant Site (Provider Location): Provider Remote Setting    Consent:  The patient/guardian has verbally consented to: the potential risks and benefits of telemedicine (video visit) versus in person care; bill my insurance or make self-payment for services provided; and responsibility for payment of non-covered services.     Mode of Communication:  Video Conference via [employer]    As the provider I attest to compliance with applicable laws and regulations related to telemedicine.      Treatment Plan Last Reviewed: 9/16/20  PHQ-9 / CHAVO-7 :   PHQ 10/27/2020 11/10/2020 11/24/2020   PHQ-9 Total Score 20 22 19   Q9: Thoughts of better off dead/self-harm past 2 weeks Several days More than half the days Several days   F/U: Thoughts of suicide or self-harm Yes - Yes   F/U: Self  harm-plan No - No   F/U: Self-harm action No - No   F/U: Safety concerns No - No     CHAVO-7 SCORE 10/27/2020 11/10/2020 11/24/2020   Total Score 17 (severe anxiety) - 17 (severe anxiety)   Total Score 17 20 17         DATA  Interactive Complexity: No  Crisis: No       Progress Since Last Session (Related to Symptoms / Goals / Homework):   Symptoms: patient appears more stable, reports thinking she is more teary, but otherwise is a little better    Homework: Achieved / completed to satisfaction  Call Dr. Dubois-done       Episode of Care Goals: Minimal progress - ACTION (Actively working towards change); Intervened by reinforcing change plan / affirming steps taken     Current / Ongoing Stressors and Concerns:   Patient is currently socially isolated. She has a conflictual relationship with her .  She is getting minimal physical activity.      Treatment Objective(s) Addressed in This Session:   Patient will increase frequency of engaging her in ADLs.  Patient will track and record at least 5 pleasant exchanges with . Patient will be able to identify at least 5 positive traits about her .  Patient will reduce level of depressive and anxious features as evidenced by reduction in score on her CHAVO-7 and PHQ-9 (scores of 15 and 16 at first measurment, respectively).     Intervention:   Person-Centered Therapy:Safety Planning:  Discussed her transition off of her medication and how she thinks she may need one more lower dose prescription to transition off. Talked about next steps of working with Dr. Dubois to determine what medications may be appropriate. Patient shared about a family member who is aging and lost a pet. Processed this. Patient also shared her  was laid off, processed this.       ASSESSMENT: Current Emotional / Mental Status (status of significant symptoms):   Risk status (Self / Other harm or suicidal ideation)   Patient denies current fears or concerns for personal  "safety.   Patient reports the following current or recent suicidal ideation or behaviors: \"it all goes with this anger, I don't know how to explain it, but I do have the number\".She further described passive suicidal thoughts.    Patient denies current or recent homicidal ideation or behaviors.   Patient denies current or recent self injurious behavior or ideation.   Patient denies other safety concerns.   Patient reports there has been no change in risk factors since their last session.     Patient reports there has been no change in protective factors since their last session.     Recommended that patient call 911 or go to the local ED should there be a change in any of these risk factors. [facility] Crisis Number was provided     Appearance:   Appropriate    Eye Contact:   Fair    Psychomotor Behavior: Normal    Attitude:   Cooperative    Orientation:   All   Speech    Rate / Production: Normal/ Responsive    Volume:  Normal    Mood:    Sad    Affect:    Tearful   Thought Content:  Clear    Thought Form:  Coherent    Insight:    Fair      Medication Review:   Changes to psychiatric medications, see updated Medication List in EPIC.      Medication Compliance:   Yes     Changes in Health Issues:   None reported     Chemical Use Review:   Substance Use: Chemical use reviewed, no active concerns identified      Tobacco Use: No current tobacco use.      Diagnosis:  1. Major depressive disorder, recurrent episode, moderate with anxious distress (H)    2. CHAVO (generalized anxiety disorder)      Collateral Reports Completed:   Not Applicable    PLAN: (Patient Tasks / Therapist Tasks / Other)  Call Dr. Dubois for a plan for the holiday  Print crisis plan          MICAELA SLADE    November 24, 2020                                                         ______________________________________________________________________    Treatment Plan    Patient's Name: Randi NICOLE Cleary  Date Of " "Birth: 1955    Date: 2020    DSM5 Diagnoses: 296.32 (F33.1) Major Depressive Disorder, Recurrent Episode, Moderate With anxious distress  Psychosocial / Contextual Factors: Patient's entire family of origin has , she now has a sister-in-law and  as support.  Relationship with  is conflictual.  Patient is reporting increase in physical symptoms due to COVID-19.  Patient would like to get off of pain medications.  WHODAS: 42    Referral / Collaboration:  Referral to another professional/service is not indicated at this time.    Anticipated number of session or this episode of care: 12-15      MeasurableTreatment Goal(s) related to diagnosis / functional impairment(s)  Goal 1: Patient will \"jumpstart, getting going with the things I need to be doing around the house as far as picking up, doing things, trying to do something every day.  Also to lessen the animosity between me and my .\"    I will know I've met my goal when my shoulders are fixed and I can see.      Objective #A (Patient Action)    Patient will increase frequency of engaging her in ADLs.  Status: Continued - Date(s): 20    Intervention(s)  Therapist will engage patient in CBT, specifically behavioral activation.    Objective #B  Patient will track and record at least 5 pleasant exchanges with . Patient will be able to identify at least 5 positive traits about her . and how he relates to her.  Status: Continued - Date(s): 20    Intervention(s)  Therapist will teach assertiveness skills and assign homework related to relationship interactions.    Objective #C  Patient will reduce level of depressive and anxious features as evidenced by reduction in score on her CHAVO-7 and PHQ-9 (scores of 15 and 16 at first measurment, respectively).  Status: Continued - Date(s): 20    Intervention(s)  Therapist will engage patient in person-centered therapy and CBT.    Patient has reviewed and agreed to the " above plan.      B MICAELA BAKER  September 16, 2020

## 2020-11-24 NOTE — PATIENT INSTRUCTIONS
"                                           Randi RIVERA Cathy Evin     SAFETY PLAN:  Step 1: Warning signs / cues (Thoughts, images, mood, situation, behavior) that a crisis may be developing:    Thoughts: \"I don't want to continue\" \"I am unwanted\"    Images: none    Thinking Processes: ruminating    Mood: anger    Behaviors: isolating/withdrawing , can't stop crying, not taking care of myself and not taking care of my responsibilities    Situations: small triggers, such as not being able to find something, or dropping something   Step 2: Coping strategies - Things I can do to take my mind off of my problems without contacting another person (relaxation technique, physical activity):    Distress Tolerance Strategies:  arts and crafts: drawing, play with my pet , listen to positive and upbeat music: any, change body temperature (ice pack/cold water)  and paced breathing/progressive muscle relaxation    Physical Activities: go for a walk, deep breathing and stretching     Focus on helpful thoughts:  \"You've been through this before, you can get through it again.\"  Step 3: People and social settings that provide distraction:   Name: Carmen    Name: Darien    Name: Aleida     pool, shopping, Carmen's house, [employer]   Step 4: Remind myself of people and things that are important to me and worth living for:  Clifford Little Donna, post-COVID world, options of what could be in your future      Step 5: When I am in crisis, I can ask these people to help me use my safety plan:   Name: Sidney  Step 6: Making the environment safe:     go to sleep/daydream  Step 7: Professionals or agencies I can contact during a crisis:    [facility] Daytime Number: [phone]    Suicide Prevention Lifeline: 0-294-779-TALK (8202)    Crisis Text Line Service (available 24 hours a day, 7 days a week): Text [place] to 631722  Local Crisis Services: [facility] Crisis: [phone]    Call 911 or go to my nearest emergency department.   I " helped develop this safety plan and agree to use it when needed.  I have been given a copy of this plan.      Client signature _________________________________________________________________  Today s date:  11/24/2020  Adapted from Safety Plan Template 2008 Randi Poole and Robby Barba is reprinted with the express permission of the authors.  No portion of the Safety Plan Template may be reproduced without the express, written permission.  You can contact the authors at [email or web address] or [email or web address].

## 2020-11-24 NOTE — TELEPHONE ENCOUNTER
Last Clinic Visit: 7/22/2020  [facility]

## 2020-11-25 ENCOUNTER — OFFICE VISIT - HEALTHEAST (OUTPATIENT)
Dept: PALLIATIVE MEDICINE | Facility: OTHER | Age: 65
End: 2020-11-25

## 2020-11-25 DIAGNOSIS — L40.50 PSORIATIC ARTHROPATHY (H): ICD-10-CM

## 2020-12-01 ENCOUNTER — HOSPITAL ENCOUNTER (OUTPATIENT)
Dept: PALLIATIVE MEDICINE | Facility: OTHER | Age: 65
Discharge: HOME | End: 2020-12-01
Attending: ANESTHESIOLOGY

## 2020-12-01 DIAGNOSIS — E03.8 OTHER SPECIFIED HYPOTHYROIDISM: ICD-10-CM

## 2020-12-01 DIAGNOSIS — M54.2 NECK PAIN: ICD-10-CM

## 2020-12-01 DIAGNOSIS — F31.63 BIPOLAR DISORDER, CURRENT EPISODE MIXED, SEVERE, WITHOUT PSYCHOTIC FEATURES (H): ICD-10-CM

## 2020-12-03 ENCOUNTER — COMMUNICATION - HEALTHEAST (OUTPATIENT)
Dept: INTERNAL MEDICINE | Facility: CLINIC | Age: 65
End: 2020-12-03

## 2020-12-04 ENCOUNTER — AMBULATORY - HEALTHEAST (OUTPATIENT)
Dept: LAB | Facility: CLINIC | Age: 65
End: 2020-12-04

## 2020-12-04 DIAGNOSIS — E03.8 OTHER SPECIFIED HYPOTHYROIDISM: ICD-10-CM

## 2020-12-04 DIAGNOSIS — F31.63 BIPOLAR DISORDER, CURRENT EPISODE MIXED, SEVERE, WITHOUT PSYCHOTIC FEATURES (H): ICD-10-CM

## 2020-12-04 DIAGNOSIS — M54.2 NECK PAIN: ICD-10-CM

## 2020-12-04 LAB
ALBUMIN SERPL-MCNC: 4.1 G/DL (ref 3.5–5)
ALP SERPL-CCNC: 81 U/L (ref 45–120)
ALT SERPL W P-5'-P-CCNC: 27 U/L (ref 0–45)
ANION GAP SERPL CALCULATED.3IONS-SCNC: 17 MMOL/L (ref 5–18)
AST SERPL W P-5'-P-CCNC: 31 U/L (ref 0–40)
BILIRUB SERPL-MCNC: 0.5 MG/DL (ref 0–1)
BUN SERPL-MCNC: 18 MG/DL (ref 8–22)
C REACTIVE PROTEIN LHE: 0.4 MG/DL (ref 0–0.8)
CALCIUM SERPL-MCNC: 10 MG/DL (ref 8.5–10.5)
CHLORIDE BLD-SCNC: 102 MMOL/L (ref 98–107)
CO2 SERPL-SCNC: 23 MMOL/L (ref 22–31)
CREAT SERPL-MCNC: 0.71 MG/DL (ref 0.6–1.1)
GFR SERPL CREATININE-BSD FRML MDRD: >60 ML/MIN/1.73M2
GLUCOSE BLD-MCNC: 96 MG/DL (ref 70–125)
POTASSIUM BLD-SCNC: 4.2 MMOL/L (ref 3.5–5)
PROT SERPL-MCNC: 7.4 G/DL (ref 6–8)
SODIUM SERPL-SCNC: 142 MMOL/L (ref 136–145)
T3FREE SERPL-MCNC: 2.8 PG/ML (ref 1.9–3.9)
T4 FREE SERPL-MCNC: 1.2 NG/DL (ref 0.7–1.8)
TSH SERPL DL<=0.005 MIU/L-ACNC: 0.06 UIU/ML (ref 0.3–5)

## 2020-12-07 ENCOUNTER — RECORDS - HEALTHEAST (OUTPATIENT)
Dept: ADMINISTRATIVE | Facility: OTHER | Age: 65
End: 2020-12-07

## 2020-12-07 ENCOUNTER — OFFICE VISIT - HEALTHEAST (OUTPATIENT)
Dept: PALLIATIVE MEDICINE | Facility: OTHER | Age: 65
End: 2020-12-07

## 2020-12-07 ENCOUNTER — AMBULATORY - HEALTHEAST (OUTPATIENT)
Dept: PALLIATIVE MEDICINE | Facility: OTHER | Age: 65
End: 2020-12-07

## 2020-12-07 ENCOUNTER — COMMUNICATION - HEALTHEAST (OUTPATIENT)
Dept: INTERNAL MEDICINE | Facility: CLINIC | Age: 65
End: 2020-12-07

## 2020-12-07 DIAGNOSIS — G25.81 RESTLESS LEGS SYNDROME (RLS): ICD-10-CM

## 2020-12-07 DIAGNOSIS — E03.9 ACQUIRED HYPOTHYROIDISM: ICD-10-CM

## 2020-12-08 ENCOUNTER — COMMUNICATION - HEALTHEAST (OUTPATIENT)
Dept: PALLIATIVE MEDICINE | Facility: OTHER | Age: 65
End: 2020-12-08

## 2020-12-08 LAB — T3REVERSE SERPL-MCNC: 29.6 NG/DL (ref 9–27)

## 2020-12-09 ENCOUNTER — VIRTUAL VISIT (OUTPATIENT)
Dept: PSYCHOLOGY | Facility: CLINIC | Age: 65
End: 2020-12-09
Payer: MEDICARE

## 2020-12-09 ENCOUNTER — MYC MEDICAL ADVICE (OUTPATIENT)
Dept: ENDOCRINOLOGY | Facility: CLINIC | Age: 65
End: 2020-12-09

## 2020-12-09 DIAGNOSIS — E89.0 POSTABLATIVE HYPOTHYROIDISM: Primary | ICD-10-CM

## 2020-12-09 DIAGNOSIS — F41.1 GAD (GENERALIZED ANXIETY DISORDER): ICD-10-CM

## 2020-12-09 DIAGNOSIS — R79.89 LOW TSH LEVEL: ICD-10-CM

## 2020-12-09 DIAGNOSIS — F33.1 MAJOR DEPRESSIVE DISORDER, RECURRENT EPISODE, MODERATE WITH ANXIOUS DISTRESS (H): Primary | ICD-10-CM

## 2020-12-09 PROCEDURE — 90834 PSYTX W PT 45 MINUTES: CPT | Mod: 95 | Performed by: SOCIAL WORKER

## 2020-12-09 RX ORDER — LEVOTHYROXINE SODIUM 150 UG/1
150 TABLET ORAL DAILY
Qty: 90 TABLET | Refills: 4 | Status: SHIPPED | OUTPATIENT
Start: 2020-12-09 | End: 2021-05-24 | Stop reason: ALTCHOICE

## 2020-12-09 NOTE — PROGRESS NOTES
"                                           Progress Note    Patient Name: Randi Cleary  Date: 12/9/2020         Service Type: Individual      Session Start Time: 10:0:1 AM  Session End Time: 10:50 AM     Session Length: 49 minutes     Session #: 20    Attendees: Client and Spouse / Significant Other    Service Modality:  Video Visit:    Telemedicine Visit: The patient's condition can be safely assessed and treated via synchronous audio and visual telemedicine encounter.      Reason for Telemedicine Visit: Services only offered telehealth    Originating Site (Patient Location): Patient's home    Distant Site (Provider Location): Provider Remote Setting    Consent:  The patient/guardian has verbally consented to: the potential risks and benefits of telemedicine (video visit) versus in person care; bill my insurance or make self-payment for services provided; and responsibility for payment of non-covered services.     Mode of Communication:  Video Conference via [employer]    As the provider I attest to compliance with applicable laws and regulations related to telemedicine.      Treatment Plan Last Reviewed: 9/16/20  PHQ-9 / CHAVO-7 :   PHQ 10/27/2020 11/10/2020 11/24/2020   PHQ-9 Total Score 20 22 19   Q9: Thoughts of better off dead/self-harm past 2 weeks Several days More than half the days Several days   F/U: Thoughts of suicide or self-harm Yes - Yes   F/U: Self harm-plan No - No   F/U: Self-harm action No - No   F/U: Safety concerns No - No     CHAVO-7 SCORE 10/27/2020 11/10/2020 11/24/2020   Total Score 17 (severe anxiety) - 17 (severe anxiety)   Total Score 17 20 17         DATA  Interactive Complexity: No  Crisis: No       Progress Since Last Session (Related to Symptoms / Goals / Homework):   Symptoms: patient reports having had several \"episodes\" lately, which her partner described as rage    Homework: Achieved / completed to satisfaction  Call Dr. Dubois-done       Episode of Care Goals: Minimal progress - " ACTION (Actively working towards change); Intervened by reinforcing change plan / affirming steps taken     Current / Ongoing Stressors and Concerns:   Patient is currently socially isolated. She has a conflictual relationship with her .  She is getting minimal physical activity.      Treatment Objective(s) Addressed in This Session:   Patient will increase frequency of engaging her in ADLs.  Patient will track and record at least 5 pleasant exchanges with . Patient will be able to identify at least 5 positive traits about her .  Patient will reduce level of depressive and anxious features as evidenced by reduction in score on her CHAVO-7 and PHQ-9 (scores of 15 and 16 at first measurment, respectively).     Intervention:   Person-Centered Therapy: Patient wants to simplify her medical team and has challenges with coordination.  Discussed next steps and options.       ASSESSMENT: Current Emotional / Mental Status (status of significant symptoms):   Risk status (Self / Other harm or suicidal ideation)   Patient denies current fears or concerns for personal safety.   Patient denies current or recent suicidal ideation or behaviors.   Patient denies current or recent homicidal ideation or behaviors.   Patient denies current or recent self injurious behavior or ideation.   Patient denies other safety concerns.   Patient reports there has been no change in risk factors since their last session.     Patient reports there has been no change in protective factors since their last session.     Recommended that patient call 911 or go to the local ED should there be a change in any of these risk factors. [facility] Crisis Number was provided     Appearance:   Appropriate    Eye Contact:   Fair    Psychomotor Behavior: Normal    Attitude:   Cooperative    Orientation:   All   Speech    Rate / Production: Normal/ Responsive    Volume:  Normal    Mood:    Sad    Affect:    Tearful   Thought  "Content:  Clear    Thought Form:  Coherent    Insight:    Fair      Medication Review:   Changes to psychiatric medications, see updated Medication List in EPIC.      Medication Compliance:   Yes     Changes in Health Issues:   None reported     Chemical Use Review:   Substance Use: Chemical use reviewed, no active concerns identified      Tobacco Use: No current tobacco use.      Diagnosis:  1. Major depressive disorder, recurrent episode, moderate with anxious distress (H)    2. CHAVO (generalized anxiety disorder)      Collateral Reports Completed:   Not Applicable    PLAN: (Patient Tasks / Therapist Tasks / Other)  Reach out to Dr. Coker to let her know that you'd like her to be her point person for the thyroid, and ask for a recommendation for a primary care physician.   Reach out to Dr. Dubois and let him know that you're working primarily with him and Dr. Coker  Referral for behavioral health home  Go to ER if needed          MICAELA SLADE    2020                                                         ______________________________________________________________________    Treatment Plan    Patient's Name: Randi Cleary  YOB: 1955    Date: 2020    DSM5 Diagnoses: 296.32 (F33.1) Major Depressive Disorder, Recurrent Episode, Moderate With anxious distress  Psychosocial / Contextual Factors: Patient's entire family of origin has , she now has a sister-in-law and  as support.  Relationship with  is conflictual.  Patient is reporting increase in physical symptoms due to COVID-19.  Patient would like to get off of pain medications.  WHODAS: 42    Referral / Collaboration:  Referral to another professional/service is not indicated at this time.    Anticipated number of session or this episode of care: 12-15      MeasurableTreatment Goal(s) related to diagnosis / functional impairment(s)  Goal 1: Patient will \"jumpstart, getting going with the " "things I need to be doing around the house as far as picking up, doing things, trying to do something every day.  Also to lessen the animosity between me and my .\"    I will know I've met my goal when my shoulders are fixed and I can see.      Objective #A (Patient Action)    Patient will increase frequency of engaging her in ADLs.  Status: Continued - Date(s): 9/16/20    Intervention(s)  Therapist will engage patient in CBT, specifically behavioral activation.    Objective #B  Patient will track and record at least 5 pleasant exchanges with . Patient will be able to identify at least 5 positive traits about her . and how he relates to her.  Status: Continued - Date(s): 9/16/20    Intervention(s)  Therapist will teach assertiveness skills and assign homework related to relationship interactions.    Objective #C  Patient will reduce level of depressive and anxious features as evidenced by reduction in score on her CHAVO-7 and PHQ-9 (scores of 15 and 16 at first measurment, respectively).  Status: Continued - Date(s): 9/16/20    Intervention(s)  Therapist will engage patient in person-centered therapy and CBT.    Patient has reviewed and agreed to the above plan.      MICAELA SLADE  September 16, 2020  "

## 2020-12-10 ENCOUNTER — TELEPHONE (OUTPATIENT)
Dept: PSYCHOLOGY | Facility: CLINIC | Age: 65
End: 2020-12-10

## 2020-12-10 ENCOUNTER — MYC MEDICAL ADVICE (OUTPATIENT)
Dept: ONCOLOGY | Facility: CLINIC | Age: 65
End: 2020-12-10

## 2020-12-10 DIAGNOSIS — E83.119 HEMOCHROMATOSIS, UNSPECIFIED HEMOCHROMATOSIS TYPE: Primary | ICD-10-CM

## 2020-12-10 DIAGNOSIS — E61.1 IRON DEFICIENCY: ICD-10-CM

## 2020-12-10 NOTE — TELEPHONE ENCOUNTER
Let patient know that behavioral home health was not covered by her insurance, but that an order was put in for care coordination.  Patient voiced some concerns she had with navigating multiple systems and being uncertain as to how to move forward.  Reminded patient of the previous conversation at the last session, patient regulated and was able to move forward in making some decisions for her care.

## 2020-12-10 NOTE — PATIENT INSTRUCTIONS
Reach out to Dr. Coker to let her know that you'd like her to be her point person for the thyroid, and ask for a recommendation for a primary care physician.   Reach out to Dr. Dubois and let him know that you're working primarily with him and Dr. Coker  Referral for behavioral health home  Go to ER if needed

## 2020-12-10 NOTE — PROGRESS NOTES
Review of additional labs not ordered by me  10/30/2020 TSH 0.37, HbbA1c 6.1%, Ca 9.8, Na 140 K 4, ferritin 20  12/4/2020  [facility] TSH 0.06, free t4 1.2, free T3 2.8, reverse T3 29.6    12/9/2020 will recommend she reduce the lT4 to 150 mcg/day.  This can be achieved as 175 x 6/week, divided until her supply runs out.  Repeat labs in 2-3 months  Alee Coker MD

## 2020-12-11 ENCOUNTER — AMBULATORY - HEALTHEAST (OUTPATIENT)
Dept: CARE COORDINATION | Facility: CLINIC | Age: 65
End: 2020-12-11

## 2020-12-11 ENCOUNTER — TELEPHONE (OUTPATIENT)
Dept: CARDIOLOGY | Facility: CLINIC | Age: 65
End: 2020-12-11

## 2020-12-11 DIAGNOSIS — F32.2 SEVERE MAJOR DEPRESSION (H): ICD-10-CM

## 2020-12-11 RX ORDER — BACILLUS COAGULANS 1B CELL
1 CAPSULE ORAL DAILY
Qty: 30 TABLET | Refills: 2 | Status: SHIPPED | OUTPATIENT
Start: 2020-12-11 | End: 2021-05-24

## 2020-12-11 NOTE — TELEPHONE ENCOUNTER
Had a 30 minute conversation with patient regarding various questions about her care. Tried to explain the rationale behind having only one specialist ordering labs and tests when they are the ones managing her medications. Also advised that OVN and encounters from her PCP can be seen in Care Everywhere, so there is no need to switch PCPs at this time. Patient verbalized understanding and did not have any further questions. Bindu Walden RN on 12/11/2020 at 10:00 AM

## 2020-12-14 ENCOUNTER — AMBULATORY - HEALTHEAST (OUTPATIENT)
Dept: PALLIATIVE MEDICINE | Facility: OTHER | Age: 65
End: 2020-12-14

## 2020-12-15 ENCOUNTER — COMMUNICATION - HEALTHEAST (OUTPATIENT)
Dept: CARE COORDINATION | Facility: CLINIC | Age: 65
End: 2020-12-15

## 2020-12-15 ENCOUNTER — DOCUMENTATION ONLY (OUTPATIENT)
Dept: SLEEP MEDICINE | Facility: CLINIC | Age: 65
End: 2020-12-15
Payer: MEDICARE

## 2020-12-15 ENCOUNTER — COMMUNICATION - HEALTHEAST (OUTPATIENT)
Dept: INTERNAL MEDICINE | Facility: CLINIC | Age: 65
End: 2020-12-15

## 2020-12-15 ENCOUNTER — VIRTUAL VISIT (OUTPATIENT)
Dept: PSYCHOLOGY | Facility: CLINIC | Age: 65
End: 2020-12-15
Payer: MEDICARE

## 2020-12-15 DIAGNOSIS — F33.1 MAJOR DEPRESSIVE DISORDER, RECURRENT EPISODE, MODERATE WITH ANXIOUS DISTRESS (H): Primary | ICD-10-CM

## 2020-12-15 DIAGNOSIS — F41.1 GAD (GENERALIZED ANXIETY DISORDER): ICD-10-CM

## 2020-12-15 PROCEDURE — 90834 PSYTX W PT 45 MINUTES: CPT | Mod: 95 | Performed by: SOCIAL WORKER

## 2020-12-15 ASSESSMENT — ACTIVITIES OF DAILY LIVING (ADL): DEPENDENT_IADLS:: TRANSPORTATION

## 2020-12-15 NOTE — PROGRESS NOTES
Progress Note    Patient Name: Randi Cleary  Date: 12/15/2020         Service Type: Individual      Session Start Time: 11:35 AM  Session End Time: 12:25 PM     Session Length: 50 minutes     Session #: 21    Attendees: Client attended alone    Service Modality:  Video Visit:    Telemedicine Visit: The patient's condition can be safely assessed and treated via synchronous audio and visual telemedicine encounter.      Reason for Telemedicine Visit: Services only offered telehealth    Originating Site (Patient Location): Patient's home    Distant Site (Provider Location): Provider Remote Setting    Consent:  The patient/guardian has verbally consented to: the potential risks and benefits of telemedicine (video visit) versus in person care; bill my insurance or make self-payment for services provided; and responsibility for payment of non-covered services.     Mode of Communication:  Video Conference via [employer]    As the provider I attest to compliance with applicable laws and regulations related to telemedicine.      Treatment Plan Last Reviewed: 9/16/20  PHQ-9 / CHAVO-7 :   PHQ 11/10/2020 11/24/2020 12/15/2020   PHQ-9 Total Score 22 19 24   Q9: Thoughts of better off dead/self-harm past 2 weeks More than half the days Several days Several days   F/U: Thoughts of suicide or self-harm - Yes No   F/U: Self harm-plan - No -   F/U: Self-harm action - No -   F/U: Safety concerns - No No     CHAVO-7 SCORE 11/10/2020 11/24/2020 12/15/2020   Total Score - 17 (severe anxiety) 19 (severe anxiety)   Total Score 20 17 19         DATA  Interactive Complexity: No  Crisis: No       Progress Since Last Session (Related to Symptoms / Goals / Homework):   Symptoms: patient had many days without anger, but experienced it again this morning    Homework: Achieved / completed to satisfaction  Reach out to Dr. Coker to let her know that you'd like her to be her point person for the thyroid,  and ask for a recommendation for a primary care physician. -done  Reach out to Dr. Dubois and let him know that you're working primarily with him and Dr. Coker -done  Go to ER if needed -not done     Episode of Care Goals: Minimal progress - ACTION (Actively working towards change); Intervened by reinforcing change plan / affirming steps taken     Current / Ongoing Stressors and Concerns:   Patient is currently socially isolated. She has a conflictual relationship with her .  She is getting minimal physical activity.      Treatment Objective(s) Addressed in This Session:   Patient will increase frequency of engaging her in ADLs.  Patient will track and record at least 5 pleasant exchanges with . Patient will be able to identify at least 5 positive traits about her .  Patient will reduce level of depressive and anxious features as evidenced by reduction in score on her CHAVO-7 and PHQ-9 (scores of 15 and 16 at first measurment, respectively).     Intervention:   Person-Centered Therapy: Reflected with patient about her challenges in managing the medical system and her multiple doctors. Talked through her next steps, including getting work done on her eyes and working with a sleep specialist to get the sleep sorted.  Agreed it made sense to take these next steps.  Talked through some grief about one of her longest friends aging recognizing she is near the end of her life, identified she should reach out to this friend.    ASSESSMENT: Current Emotional / Mental Status (status of significant symptoms):   Risk status (Self / Other harm or suicidal ideation)   Patient denies current fears or concerns for personal safety.   Patient reports the following current or recent suicidal ideation or behaviors: passive death wish.   Patient denies current or recent homicidal ideation or behaviors.   Patient denies current or recent self injurious behavior or ideation.   Patient denies other safety  concerns.   Patient reports there has been no change in risk factors since their last session.     Patient reports there has been no change in protective factors since their last session.     A safety and risk management plan has been developed including: Patient consented to co-developed safety plan.  Safety and risk management plan was completed.  Patient agreed to use safety plan should any safety concerns arise.  A copy was given to the patient.      Appearance:   Appropriate    Eye Contact:   Fair    Psychomotor Behavior: Normal    Attitude:   Cooperative    Orientation:   All   Speech    Rate / Production: Normal/ Responsive    Volume:  Normal    Mood:    Sad    Affect:    Worrisome    Thought Content:  Clear    Thought Form:  Coherent    Insight:    Fair      Medication Review:   Changes to psychiatric medications, see updated Medication List in EPIC.      Medication Compliance:   Yes     Changes in Health Issues:   None reported     Chemical Use Review:   Substance Use: Chemical use reviewed, no active concerns identified      Tobacco Use: No current tobacco use.      Diagnosis:  1. Major depressive disorder, recurrent episode, moderate with anxious distress (H)    2. CHAVO (generalized anxiety disorder)      Collateral Reports Completed:   Not Applicable    PLAN: (Patient Tasks / Therapist Tasks / Other)  Reach out to friend          MICAELA SLADE    December 15, 2020                                                         ______________________________________________________________________    Treatment Plan    Patient's Name: Randi Cleary  YOB: 1955    Date: 12/15/2020    DSM5 Diagnoses: 296.32 (F33.1) Major Depressive Disorder, Recurrent Episode, Moderate With anxious distress  Psychosocial / Contextual Factors: Patient's entire family of origin has , she now has a sister-in-law and  as support.  Relationship with  is conflictual.  Patient is reporting increase  "in physical symptoms due to COVID-19.  Patient would like to get off of pain medications.  WHODAS: 42    Referral / Collaboration:  Referral to another professional/service is not indicated at this time.     Anticipated number of session or this episode of care: 12-15      MeasurableTreatment Goal(s) related to diagnosis / functional impairment(s)  Goal 1: Patient will \"jumpstart, getting going with the things I need to be doing around the house as far as picking up, doing things, trying to do something every day.  Also to lessen the animosity between me and my .\"    I will know I've met my goal when my shoulders are fixed and I can see.      Objective #A (Patient Action)    Patient will increase frequency of engaging her in ADLs.  Status: Continued - Date(s): 12/15/2020    Intervention(s)  Therapist will engage patient in CBT, specifically behavioral activation.    Objective #B  Patient will track and record at least 5 pleasant exchanges with . Patient will be able to identify at least 5 positive traits about her . and how he relates to her.  Status: Continued - Date(s): 12/15/2020    Intervention(s)  Therapist will teach assertiveness skills and assign homework related to relationship interactions.    Objective #C  Patient will reduce level of depressive and anxious features as evidenced by reduction in score on her CHAVO-7 and PHQ-9 (scores of 15 and 16 at first measurment, respectively).  Status: Continued - Date(s): 12/15/2020    Intervention(s)  Therapist will engage patient in person-centered therapy and CBT.    Patient has reviewed and agreed to the above plan.      MICAELA SLADE  December 15, 2020  "

## 2020-12-15 NOTE — PROGRESS NOTES
Patient called she started using her CPAP again patient having problems with restless legs scheduled her to see her Provider.

## 2020-12-16 ENCOUNTER — COMMUNICATION - HEALTHEAST (OUTPATIENT)
Dept: SCHEDULING | Facility: CLINIC | Age: 65
End: 2020-12-16

## 2020-12-16 ENCOUNTER — COMMUNICATION - HEALTHEAST (OUTPATIENT)
Dept: PALLIATIVE MEDICINE | Facility: OTHER | Age: 65
End: 2020-12-16

## 2020-12-16 ENCOUNTER — TELEPHONE (OUTPATIENT)
Dept: ONCOLOGY | Facility: CLINIC | Age: 65
End: 2020-12-16

## 2020-12-16 ENCOUNTER — TELEPHONE (OUTPATIENT)
Dept: PSYCHOLOGY | Facility: CLINIC | Age: 65
End: 2020-12-16

## 2020-12-16 NOTE — TELEPHONE ENCOUNTER
"Patient calls into triage to report symptoms of restless legs, arms and lower back. She is tearful and states the restless is not allowing her to sleep at night. She tried Mirapex and states \"it just made things worse\"    She started Iron tablets on 12/11. She inquires if there is anything she can take to help relieve restless legs.     She has taken Valium in the past which she states it not helpful. She also mentions having serotonin syndrome and being off of her medications. She is following with Dr. Dubois at [facility].       Paged Dr. Hill. Patient to seek care with PCP regarding RLS symptoms. Continue oral iron as prescribed. Spoke with patient and provided this information. She verbalizes understanding.   "

## 2020-12-16 NOTE — TELEPHONE ENCOUNTER
Patient requested a call.  Spoke about restless legs and them impacting their sleep.  She shared what she has already done to resolve this. Also let patient know there has been a cancellation in the schedule and she scheduled an appointment for the next week. She reported she had concerns about the last progress note and wanted to follow up about her concerns, but would do it at the appt.

## 2020-12-17 ENCOUNTER — DOCUMENTATION ONLY (OUTPATIENT)
Dept: SLEEP MEDICINE | Facility: CLINIC | Age: 65
End: 2020-12-17
Payer: MEDICARE

## 2020-12-17 ENCOUNTER — COMMUNICATION - HEALTHEAST (OUTPATIENT)
Dept: PALLIATIVE MEDICINE | Facility: OTHER | Age: 65
End: 2020-12-17

## 2020-12-17 ENCOUNTER — COMMUNICATION - HEALTHEAST (OUTPATIENT)
Dept: FAMILY MEDICINE | Facility: CLINIC | Age: 65
End: 2020-12-17

## 2020-12-17 NOTE — PROGRESS NOTES
Patient having trouble sleeping because of her restless legs and feels it's affecting her whole body. Patient has a lot of medications questions. Patient will Mychart her Provider. Patient would like a card sent to her so she can download her information and send back.    no

## 2020-12-18 ENCOUNTER — AMBULATORY - HEALTHEAST (OUTPATIENT)
Dept: PALLIATIVE MEDICINE | Facility: OTHER | Age: 65
End: 2020-12-18

## 2020-12-18 ENCOUNTER — TELEPHONE (OUTPATIENT)
Dept: PSYCHOLOGY | Facility: CLINIC | Age: 65
End: 2020-12-18

## 2020-12-18 NOTE — TELEPHONE ENCOUNTER
Patient requested a call, she was in [facility] as she was feeling overwhelmed and exhausted. Stated they were not going to admit her and she was feeling hopeless about recovering. Reminded her there were other options, such as day treatment that we could pursue. Also reminded her of the crisis number.

## 2020-12-21 ENCOUNTER — COMMUNICATION - HEALTHEAST (OUTPATIENT)
Dept: INTERNAL MEDICINE | Facility: CLINIC | Age: 65
End: 2020-12-21

## 2020-12-21 ENCOUNTER — VIRTUAL VISIT (OUTPATIENT)
Dept: PSYCHOLOGY | Facility: CLINIC | Age: 65
End: 2020-12-21
Payer: MEDICARE

## 2020-12-21 ENCOUNTER — RECORDS - HEALTHEAST (OUTPATIENT)
Dept: ADMINISTRATIVE | Facility: OTHER | Age: 65
End: 2020-12-21

## 2020-12-21 DIAGNOSIS — F41.1 GAD (GENERALIZED ANXIETY DISORDER): ICD-10-CM

## 2020-12-21 DIAGNOSIS — F33.1 MAJOR DEPRESSIVE DISORDER, RECURRENT EPISODE, MODERATE WITH ANXIOUS DISTRESS (H): Primary | ICD-10-CM

## 2020-12-21 PROCEDURE — 90834 PSYTX W PT 45 MINUTES: CPT | Mod: 95 | Performed by: SOCIAL WORKER

## 2020-12-21 NOTE — PROGRESS NOTES
Progress Note    Patient Name: Randi Cleary  Date: 12/21/2020         Service Type: Individual      Session Start Time: 5:08 PM  Session End Time: 6:00 PM     Session Length: 52 minutes     Session #: 22    Attendees: Client attended alone    Service Modality:  Video Visit:    Telemedicine Visit: The patient's condition can be safely assessed and treated via synchronous audio and visual telemedicine encounter.      Reason for Telemedicine Visit: Services only offered telehealth    Originating Site (Patient Location): Patient's home    Distant Site (Provider Location): Provider Remote Setting    Consent:  The patient/guardian has verbally consented to: the potential risks and benefits of telemedicine (video visit) versus in person care; bill my insurance or make self-payment for services provided; and responsibility for payment of non-covered services.     Mode of Communication:  Video Conference via [employer]    As the provider I attest to compliance with applicable laws and regulations related to telemedicine.      Treatment Plan Last Reviewed: 12/15/2020  PHQ-9 / CHAVO-7 :   PHQ 11/10/2020 11/24/2020 12/15/2020   PHQ-9 Total Score 22 19 24   Q9: Thoughts of better off dead/self-harm past 2 weeks More than half the days Several days Several days   F/U: Thoughts of suicide or self-harm - Yes No   F/U: Self harm-plan - No -   F/U: Self-harm action - No -   F/U: Safety concerns - No No     CHAVO-7 SCORE 11/10/2020 11/24/2020 12/15/2020   Total Score - 17 (severe anxiety) 19 (severe anxiety)   Total Score 20 17 19         DATA  Interactive Complexity: No  Crisis: No       Progress Since Last Session (Related to Symptoms / Goals / Homework):   Symptoms: patient reports she has had continued anger, saddness, and crying, but is more hopeful    Homework: Achieved / completed to satisfaction  Reach out to friend -done     Episode of Care Goals: Minimal progress - ACTION (Actively  working towards change); Intervened by reinforcing change plan / affirming steps taken     Current / Ongoing Stressors and Concerns:   Patient is currently socially isolated. She has a conflictual relationship with her .  She is getting minimal physical activity.      Treatment Objective(s) Addressed in This Session:   Patient will increase frequency of engaging her in ADLs.  Patient will track and record at least 5 pleasant exchanges with . Patient will be able to identify at least 5 positive traits about her .  Patient will reduce level of depressive and anxious features as evidenced by reduction in score on her CHAVO-7 and PHQ-9 (scores of 15 and 16 at first measurment, respectively).     Intervention:   Person-Centered Therapy:   Patient shared that the restless legs are better with the new medication she is on. Talked about her next steps for her medical care, including how she is managing with her sleep challenges. Processed emotions related to her care, including how electronic medical records are shared. Reviewed homework and identified successes and barriers to completion.    ASSESSMENT: Current Emotional / Mental Status (status of significant symptoms):   Risk status (Self / Other harm or suicidal ideation)   Patient denies current fears or concerns for personal safety.   Patient reports the following current or recent suicidal ideation or behaviors: passive death wish, with no plans to hurt herself.   Patient denies current or recent homicidal ideation or behaviors.   Patient denies current or recent self injurious behavior or ideation.   Patient denies other safety concerns.   Patient reports there has been no change in risk factors since their last session.     Patient reports there has been no change in protective factors since their last session.     A safety and risk management plan has been developed including: Patient consented to co-developed safety plan.  Safety and risk  management plan was completed.  Patient agreed to use safety plan should any safety concerns arise.  A copy was given to the patient. This was done in a previous session.     Appearance:   Appropriate    Eye Contact:   Fair    Psychomotor Behavior: Normal    Attitude:   Cooperative    Orientation:   All   Speech    Rate / Production: Normal/ Responsive    Volume:  Normal    Mood:    Hopeful   Affect:    Appropriate    Thought Content:  Clear    Thought Form:  Coherent    Insight:    Fair      Medication Review:   Changes to psychiatric medications, see updated Medication List in EPIC.      Medication Compliance:   Yes     Changes in Health Issues:   None reported     Chemical Use Review:   Substance Use: Chemical use reviewed, no active concerns identified      Tobacco Use: No current tobacco use.      Diagnosis:  1. Major depressive disorder, recurrent episode, moderate with anxious distress (H)    2. CHAVO (generalized anxiety disorder)      Collateral Reports Completed:   Not Applicable    PLAN: (Patient Tasks / Therapist Tasks / Other)  Continue to find hope  Eye surgery           MICAELA SLADE    2020                                                         ______________________________________________________________________    Treatment Plan    Patient's Name: Randi Cleary  YOB: 1955    Date: 12/15/2020    DSM5 Diagnoses: 296.32 (F33.1) Major Depressive Disorder, Recurrent Episode, Moderate With anxious distress  Psychosocial / Contextual Factors: Patient's entire family of origin has , she now has a sister-in-law and  as support.  Relationship with  is conflictual.  Patient is reporting increase in physical symptoms due to COVID-19.  Patient would like to get off of pain medications.  WHODAS: 42    Referral / Collaboration:  Referral to another professional/service is not indicated at this time.     Anticipated number of session or this episode of  "care: 12-15      MeasurableTreatment Goal(s) related to diagnosis / functional impairment(s)  Goal 1: Patient will \"jumpstart, getting going with the things I need to be doing around the house as far as picking up, doing things, trying to do something every day.  Also to lessen the animosity between me and my .\"    I will know I've met my goal when my shoulders are fixed and I can see.      Objective #A (Patient Action)    Patient will increase frequency of engaging her in ADLs.  Status: Continued - Date(s): 12/15/2020    Intervention(s)  Therapist will engage patient in CBT, specifically behavioral activation.    Objective #B  Patient will track and record at least 5 pleasant exchanges with . Patient will be able to identify at least 5 positive traits about her . and how he relates to her.  Status: Continued - Date(s): 12/15/2020    Intervention(s)  Therapist will teach assertiveness skills and assign homework related to relationship interactions.    Objective #C  Patient will reduce level of depressive and anxious features as evidenced by reduction in score on her CHAVO-7 and PHQ-9 (scores of 15 and 16 at first measurment, respectively).  Status: Continued - Date(s): 12/15/2020    Intervention(s)  Therapist will engage patient in person-centered therapy and CBT.    Patient has reviewed and agreed to the above plan.      MICAELA SLADE  December 15, 2020  "

## 2020-12-22 ENCOUNTER — COMMUNICATION - HEALTHEAST (OUTPATIENT)
Dept: INTERNAL MEDICINE | Facility: CLINIC | Age: 65
End: 2020-12-22

## 2020-12-23 ENCOUNTER — COMMUNICATION - HEALTHEAST (OUTPATIENT)
Dept: PALLIATIVE MEDICINE | Facility: OTHER | Age: 65
End: 2020-12-23

## 2020-12-23 DIAGNOSIS — G25.81 RESTLESS LEGS SYNDROME (RLS): ICD-10-CM

## 2020-12-28 ENCOUNTER — COMMUNICATION - HEALTHEAST (OUTPATIENT)
Dept: PALLIATIVE MEDICINE | Facility: OTHER | Age: 65
End: 2020-12-28

## 2020-12-28 DIAGNOSIS — F41.1 ANXIETY STATE: ICD-10-CM

## 2020-12-29 ENCOUNTER — AMBULATORY - HEALTHEAST (OUTPATIENT)
Dept: NURSING | Facility: CLINIC | Age: 65
End: 2020-12-29

## 2020-12-29 ASSESSMENT — ACTIVITIES OF DAILY LIVING (ADL): DEPENDENT_IADLS:: SHOPPING;CLEANING;LAUNDRY;TRANSPORTATION

## 2020-12-30 ENCOUNTER — COMMUNICATION - HEALTHEAST (OUTPATIENT)
Dept: NURSING | Facility: CLINIC | Age: 65
End: 2020-12-30

## 2020-12-30 ENCOUNTER — TRANSFERRED RECORDS (OUTPATIENT)
Dept: SLEEP MEDICINE | Facility: CLINIC | Age: 65
End: 2020-12-30

## 2020-12-30 ENCOUNTER — DOCUMENTATION ONLY (OUTPATIENT)
Dept: SLEEP MEDICINE | Facility: CLINIC | Age: 65
End: 2020-12-30

## 2020-12-30 NOTE — PROGRESS NOTES
STM Recheck:  Patient called to see if we have her previous sleep study that was done at [facility].  My co-worker scanned in the study into Epic. Patient wondering if we received a download from her CPAP. Told her we have not received her card chip.

## 2020-12-31 ENCOUNTER — COMMUNICATION - HEALTHEAST (OUTPATIENT)
Dept: PALLIATIVE MEDICINE | Facility: OTHER | Age: 65
End: 2020-12-31

## 2020-12-31 ENCOUNTER — VIRTUAL VISIT (OUTPATIENT)
Dept: PSYCHOLOGY | Facility: CLINIC | Age: 65
End: 2020-12-31
Payer: MEDICARE

## 2020-12-31 DIAGNOSIS — F41.1 GAD (GENERALIZED ANXIETY DISORDER): ICD-10-CM

## 2020-12-31 DIAGNOSIS — F33.1 MAJOR DEPRESSIVE DISORDER, RECURRENT EPISODE, MODERATE WITH ANXIOUS DISTRESS (H): Primary | ICD-10-CM

## 2020-12-31 PROCEDURE — 90837 PSYTX W PT 60 MINUTES: CPT | Mod: 95 | Performed by: SOCIAL WORKER
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
## 2020-12-31 NOTE — PATIENT INSTRUCTIONS
At next doctor's appointment request they go through your problem list and update it  Continue to work with the care coordinator and mention your concerns directly to him

## 2021-01-05 ENCOUNTER — HOSPITAL ENCOUNTER (OUTPATIENT)
Dept: PALLIATIVE MEDICINE | Facility: OTHER | Age: 66
Discharge: HOME | End: 2021-01-05
Attending: ANESTHESIOLOGY

## 2021-01-05 DIAGNOSIS — G25.81 RESTLESS LEGS SYNDROME (RLS): ICD-10-CM

## 2021-01-06 ENCOUNTER — OFFICE VISIT - HEALTHEAST (OUTPATIENT)
Dept: INTERNAL MEDICINE | Facility: CLINIC | Age: 66
End: 2021-01-06

## 2021-01-06 ENCOUNTER — VIRTUAL VISIT (OUTPATIENT)
Dept: PSYCHOLOGY | Facility: CLINIC | Age: 66
End: 2021-01-06
Payer: MEDICARE

## 2021-01-06 DIAGNOSIS — E11.9 TYPE 2 DIABETES MELLITUS WITHOUT COMPLICATION, WITHOUT LONG-TERM CURRENT USE OF INSULIN (H): ICD-10-CM

## 2021-01-06 DIAGNOSIS — Z12.11 SCREEN FOR COLON CANCER: ICD-10-CM

## 2021-01-06 DIAGNOSIS — G47.33 SLEEP APNEA, OBSTRUCTIVE: ICD-10-CM

## 2021-01-06 DIAGNOSIS — R60.9 EDEMA: ICD-10-CM

## 2021-01-06 DIAGNOSIS — Z00.00 ENCOUNTER FOR PREVENTIVE CARE: ICD-10-CM

## 2021-01-06 DIAGNOSIS — F33.1 MAJOR DEPRESSIVE DISORDER, RECURRENT EPISODE, MODERATE WITH ANXIOUS DISTRESS (H): Primary | ICD-10-CM

## 2021-01-06 DIAGNOSIS — J44.9 CHRONIC OBSTRUCTIVE PULMONARY DISEASE, UNSPECIFIED COPD TYPE (H): ICD-10-CM

## 2021-01-06 DIAGNOSIS — F31.63 BIPOLAR DISORDER, CURRENT EPISODE MIXED, SEVERE, WITHOUT PSYCHOTIC FEATURES (H): ICD-10-CM

## 2021-01-06 DIAGNOSIS — F41.1 GAD (GENERALIZED ANXIETY DISORDER): ICD-10-CM

## 2021-01-06 DIAGNOSIS — L40.50 PSORIATIC ARTHROPATHY (H): ICD-10-CM

## 2021-01-06 DIAGNOSIS — E83.110 HEREDITARY HEMOCHROMATOSIS (H): ICD-10-CM

## 2021-01-06 DIAGNOSIS — C50.912 MALIGNANT NEOPLASM OF LEFT FEMALE BREAST, UNSPECIFIED ESTROGEN RECEPTOR STATUS, UNSPECIFIED SITE OF BREAST (H): ICD-10-CM

## 2021-01-06 DIAGNOSIS — E89.0 POSTABLATIVE HYPOTHYROIDISM: ICD-10-CM

## 2021-01-06 DIAGNOSIS — I27.20 PULMONARY HYPERTENSION (H): ICD-10-CM

## 2021-01-06 DIAGNOSIS — E66.01 MORBID OBESITY (H): ICD-10-CM

## 2021-01-06 DIAGNOSIS — Z01.818 PRE-OP EXAM: ICD-10-CM

## 2021-01-06 LAB
ERYTHROCYTE [DISTWIDTH] IN BLOOD BY AUTOMATED COUNT: 13.6 % (ref 11–14.5)
HBA1C MFR BLD: 5.9 %
HCT VFR BLD AUTO: 49.2 % (ref 35–47)
HGB BLD-MCNC: 17.2 G/DL (ref 12–16)
MCH RBC QN AUTO: 31.3 PG (ref 27–34)
MCHC RBC AUTO-ENTMCNC: 34.9 G/DL (ref 32–36)
MCV RBC AUTO: 90 FL (ref 80–100)
PLATELET # BLD AUTO: 254 THOU/UL (ref 140–440)
PMV BLD AUTO: 6.3 FL (ref 7–10)
RBC # BLD AUTO: 5.48 MILL/UL (ref 3.8–5.4)
WBC: 8.3 THOU/UL (ref 4–11)

## 2021-01-06 PROCEDURE — 90834 PSYTX W PT 45 MINUTES: CPT | Mod: 95 | Performed by: SOCIAL WORKER

## 2021-01-06 ASSESSMENT — MIFFLIN-ST. JEOR: SCORE: 1598.88
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
## 2021-01-07 ENCOUNTER — VIRTUAL VISIT (OUTPATIENT)
Dept: SLEEP MEDICINE | Facility: CLINIC | Age: 66
End: 2021-01-07
Payer: MEDICARE

## 2021-01-07 ENCOUNTER — COMMUNICATION - HEALTHEAST (OUTPATIENT)
Dept: INTERNAL MEDICINE | Facility: CLINIC | Age: 66
End: 2021-01-07

## 2021-01-07 VITALS — WEIGHT: 277 LBS | HEIGHT: 66 IN | BODY MASS INDEX: 44.52 KG/M2

## 2021-01-07 DIAGNOSIS — G25.81 RESTLESS LEGS SYNDROME (RLS): ICD-10-CM

## 2021-01-07 DIAGNOSIS — G47.33 OSA (OBSTRUCTIVE SLEEP APNEA): Primary | ICD-10-CM

## 2021-01-07 LAB
ANION GAP SERPL CALCULATED.3IONS-SCNC: 17 MMOL/L (ref 5–18)
ATRIAL RATE - MUSE: 102 BPM
BUN SERPL-MCNC: 11 MG/DL (ref 8–22)
CALCIUM SERPL-MCNC: 9.9 MG/DL (ref 8.5–10.5)
CHLORIDE BLD-SCNC: 104 MMOL/L (ref 98–107)
CO2 SERPL-SCNC: 22 MMOL/L (ref 22–31)
CREAT SERPL-MCNC: 0.64 MG/DL (ref 0.6–1.1)
DIASTOLIC BLOOD PRESSURE - MUSE: NORMAL
FERRITIN SERPL-MCNC: 50 NG/ML (ref 10–130)
GFR SERPL CREATININE-BSD FRML MDRD: >60 ML/MIN/1.73M2
GLUCOSE BLD-MCNC: 91 MG/DL (ref 70–125)
INTERPRETATION ECG - MUSE: NORMAL
IRON SATN MFR SERPL: 26 % (ref 20–50)
IRON SERPL-MCNC: 78 UG/DL (ref 42–175)
P AXIS - MUSE: 45 DEGREES
POTASSIUM BLD-SCNC: 3.9 MMOL/L (ref 3.5–5)
PR INTERVAL - MUSE: 166 MS
QRS DURATION - MUSE: 116 MS
QT - MUSE: 356 MS
QTC - MUSE: 463 MS
R AXIS - MUSE: 68 DEGREES
SODIUM SERPL-SCNC: 143 MMOL/L (ref 136–145)
SYSTOLIC BLOOD PRESSURE - MUSE: NORMAL
T AXIS - MUSE: 24 DEGREES
TIBC SERPL-MCNC: 302 UG/DL (ref 313–563)
TRANSFERRIN SERPL-MCNC: 241 MG/DL (ref 212–360)
VENTRICULAR RATE- MUSE: 102 BPM

## 2021-01-07 PROCEDURE — 99215 OFFICE O/P EST HI 40 MIN: CPT | Mod: 95 | Performed by: INTERNAL MEDICINE

## 2021-01-07 ASSESSMENT — MIFFLIN-ST. JEOR: SCORE: 1808.21

## 2021-01-07 NOTE — PATIENT INSTRUCTIONS
Avoid dyphenhydramine (Ibuprofen okay but not ibuprofen PM)  Resume CPAP asap!    For general sleep health questions: http://sleepeducation.org    For tips about PAP and COVID -19:  https://www.thoracic.org/patients/patient-resources/resources/covid-19-and-home-pap-therapy.pdf          Continue PAP therapy every night, for all hours that you are sleeping.  If you nap use CPAP.  As always, try to get at least 8 hours of sleep or more each day, keep a regular sleep schedule, and avoid sleep deprivation. Avoid alcohol.    Reasons that you might need a change to your pressure therapy would be weight gain or loss, waking having inadvertently removed your PAP overnight, having previously felt refreshed by sleep with CPAP use and now waking un-refreshed, and return of daytime sleepiness. Also, the development of new medical problems  (such as heart failure, stroke, medications such as narcotics) can sometimes affect breathing at night and change your PAP therapy needs.    Please bring PAP with you if you are hospitalized.  If anticipating surgery be sure to discuss with your surgeon that you have sleep apnea and use PAP therapy.      Maintain your equipment as recommended which includes routine cleaning and replacement of supplies.      Call DME for any questions regarding supplies or maintenance.    [facility], [facility] [phone]      Do not drive on engage in potentially dangerous activities if feeling sleepy.    Please follow up in sleep clinic again in 6 months and bring your machine and card to your follow-up visit.          Tips for your PAP use-    Mask fitting tips  Mask fitting exercise:    To improve your mask seal and your mobility at night, put mask on and secure in place.  Lie down in bed with full pressure and roll to one side, adjust headgear while in that position to eliminate any leaks. Repeat process rolling to other side.     The mask seal does not have  to be perfect:   CPAP machines are designed to make up for small leaks. However, you will not tolerate leaks blowing in your eyes so you will need to adjust.   Any leak should only be near or at the bottom of the mask.  We expect your mask to leak slightly at night.    Do not over-tighten the headgear straps, tighter IS NOT better, we expect minimal leak.    First try re-positioning the mask or headgear before tightening the headgear straps.  Mask leaks are expected due to changing sleeping positions. Try pulling the mask away from your skin allowing the cushion to re-inflate will minimize the leak.  If you struggle for a good fit, try turning the CPAP off and then readjust the mask by pulling it away from your face and then turning back on the CPAP.        Humidifier tips  Humidifiers can be adjusted to increase or decrease the amount of moisture according to your comfort level. You may need to adjust this frequently at first, but then might only change it with seasonal weather changes.     Try INCREASING the humidity if:  You experience a dry, irritated nasal passage or throat.  You have a runny, drippy nose or sneezing fits after using CPAP.  You experience nasal congestion during or after CPAP use.    Try DECREASING the humidity if:  You have excessive condensation or  rain out  in the tubing or mask.  Otherwise keep the tubing warm during the night by running it underneath the blankets or pillow.      Clinic visit after initial PAP set-up   Bring your equipment with you to your 4 week follow up clinic visit.  We will be extracting your data from the machine.        Travel  Always take your equipment with you.  If you fly with your equipment bring it on with you as a carry on.  Medical equipment does not count as a carry on.    If you travel international the machines take 110-240.  The only adapter needed is the adapter that will fit into the receptacle (outlet).    You may also want to bring an extension cord  as many hotel rooms have limited outlets at the bedside.  Do not travel with water in your humidifier chamber.     Cleaning and Maintenance Guidelines    Equipment Frequency Cleaning Method   Mask First Day    Daily      Weekly Soak mask in hot soapy water for 30 minutes, rinse and air dry.  Wipe nasal cushion with a hot soapy (Ivory, baby shampoo) cloth and rinse.  Baby wipes may also be used.  Do not use anti-bacterial soaps,Amanda  liquid soap, rubbing alcohol, bleach or ammonia.  Wash frame in hot soapy water (Ivory, baby shampoo) rinse and let air dry   Headgear Biweekly Wash in hot soapy water, rinse and air dry   Reusable Gray Filter Weekly Wash in hot soapy water, rinse, put in towel squeeze moisture out, let air dry   Disposable White Filter Check Weekly Replace when brown or gray in color; at least every 2 to 3 months   Humidifier Chamber Daily    Weekly Empty distilled water from humidifier and let air dry    Hand wash in hot soapy water, rinse and air dry   Tubing Weekly Wash in hot soapy water, rinse and let air dry   Mask, Tubing and Humidifier Chamber As needed Disinfect: Soak in 1 part distilled white vinegar to 3 parts hot water for 30 minutes, rinse well and air dry  Not the material headgear        MASK AND SUPPLY REORDERING and EQUIPMENT NEEDS through your DME and per your insurance  Reminder: Most insurance companies will allow for a new mask, headgear, tubing, and reusable gray filter every six months.  Disposable white ultra-fine filters are covered monthly.      HOME AND SAFETY INSTRUCTIONS    Do not use frayed or cracked electrical cords, multi plug adaptors, or switched receptacles    Do not immerse electrical equipment into water    Assure that electrical cords do not become a tripping hazard      Your BMI is Body mass index is 44.71 kg/m .  Weight management is a personal decision.  If you are interested in exploring weight loss strategies, the following discussion covers the approaches  that may be successful. Body mass index (BMI) is one way to tell whether you are at a healthy weight, overweight, or obese. It measures your weight in relation to your height.  A BMI of 18.5 to 24.9 is in the healthy range. A person with a BMI of 25 to 29.9 is considered overweight, and someone with a BMI of 30 or greater is considered obese. More than two-thirds of American adults are considered overweight or obese.  Being overweight or obese increases the risk for further weight gain. Excess weight may lead to heart disease and diabetes.  Creating and following plans for healthy eating and physical activity may help you improve your health.  Weight control is part of healthy lifestyle and includes exercise, emotional health, and healthy eating habits. Careful eating habits lifelong are the mainstay of weight control. Though there are significant health benefits from weight loss, long-term weight loss with diet alone may be very difficult to achieve- studies show long-term success with dietary management in less than 10% of people. Attaining a healthy weight may be especially difficult to achieve in those with severe obesity. In some cases, medications, devices and surgical management might be considered.  What can you do?  If you are overweight or obese and are interested in methods for weight loss, you should discuss this with your provider.     Consider reducing daily calorie intake by 500 calories.     Keep a food journal.     Avoiding skipping meals, consider cutting portions instead.    Diet combined with exercise helps maintain muscle while optimizing fat loss. Strength training is particularly important for building and maintaining muscle mass. Exercise helps reduce stress, increase energy, and improves fitness. Increasing exercise without diet control, however, may not burn enough calories to loose weight.       Start walking three days a week 10-20 minutes at a time    Work towards walking thirty minutes  five days a week     Eventually, increase the speed of your walking for 1-2 minutes at time    In addition, we recommend that you review healthy lifestyles and methods for weight loss available through the National Institutes of Health patient information sites:  http://win.niddk.nih.gov/publications/index.htm    And look into health and wellness programs that may be available through your health insurance provider, employer, local community center, or [employer] club.    Weight management plan: Discussed healthy diet and exercise guidelines

## 2021-01-07 NOTE — Clinical Note
Pt transferring from [facility], needs mask fitting data card, please call pt.(sleep study scanned)  TC

## 2021-01-07 NOTE — PROGRESS NOTES
Randi Cleary is a 65 year old female who is being evaluated via a billable video visit.      How would you like to obtain your AVS? MyChart  If the video visit is dropped, the invitation should be resent by: Send to e-mail at: [email or web address]  Will anyone else be joining your video visit? No      Video Start Time: 1:37 PM  Assessment & Plan   Problem List Items Addressed This Visit     KYAW (obstructive sleep apnea) - Primary    Relevant Orders    Comprehensive DME (Completed)      Other Visit Diagnoses     Restless legs syndrome (RLS)          65-year-old female with mood disorder, the summer complicated by right serotonin syndrome is now off all medications for mood disorder.  She is on ropinirole for restless legs and had exacerbation of restless legs last December that caused her to stop using CPAP.  She is not resume CPAP recently because she did not know it could use it would work without the CPAP card which she sent in.  She does feel that she sleeps better when she uses CPAP.  Restless legs are improving.  They may improve further if she discontinues the ibuprofen with diphenhydramine.  She should continue iron supplementation.  She is like to change DME suppliers.  Orders generated for CPAP supplies, mask fitting.  She should follow-up with me in 8 weeks with a download.      50 minutes spent on the date of the encounter doing chart review, history and exam, documentation and further activities as noted above              Return in about 8 weeks (around 3/4/2021).    Sharmila Gregory MD  [facility]     Randi Cleary is a 65 year old who presents to clinic today for the following health issues obstructive sleep apnea.      HPI   Since her last visit she has experienced serotonin syndrome and is now off of all her psychiatric medications.  She reports issues with using CPAP particularly when she was having excessive restless legs.  She also  stopped recently because she sent in her data card for download and assumed that she could not use the machine without the card.  She is waiting for the card to be returned to her.  She does state that restless legs are improving a little bit.  She is taking medication provided by her psychiatrist for this.  She reports she continues to take iron as well.    Total score - [place]: 3 (1/7/2021  3:00 PM) (Less than 10 normal)    Insomnia Severity Score TEVIN Total Score: 20 (normal 0-7, mild 8-14, moderate 15-21, severe 22-28)        Objective           Vitals:  No vitals were obtained today due to virtual visit.    Physical Exam   GENERAL: Healthy, alert and no distress, obese  EYES: Eyes grossly normal to inspection.  No discharge or erythema, or obvious scleral/conjunctival abnormalities.  RESP: No audible wheeze, cough, or visible cyanosis.  No visible retractions or increased work of breathing.    SKIN: Visible skin clear. No significant rash, abnormal pigmentation or lesions.  NEURO: Cranial nerves grossly intact.  Mentation and speech appropriate for age.  PSYCH: Mentation appears normal, tangential speech, and appearance well-groomed.    CPAP download:  [employer] 11/18/2022 12/17/2020  Percent of days used greater than 4 hours 70%  Average use on days used 6 hours and 52 minutes  CPAP set at 11  AHI 4.2  95th percentile leak excessive    PSG 5/9/2017 [facility]  Weight 233 pounds, BMI 37.4  ESS 15  AHI 36.9 with associated hypoxemia    Video-Visit Details    Type of service:  Video Visit    Video End Time:2:16 PM    Originating Location (pt. Location): Home    Distant Location (provider location):  [facility] [place]     Platform used for Video Visit: Tara CANALES CMA, [facility], 1/7/2021 10:48 AM

## 2021-01-08 ENCOUNTER — TELEPHONE (OUTPATIENT)
Dept: SLEEP MEDICINE | Facility: CLINIC | Age: 66
End: 2021-01-08

## 2021-01-08 LAB — HCV AB SERPL QL IA: NEGATIVE

## 2021-01-08 NOTE — TELEPHONE ENCOUNTER
CALLED PT TO VERIFY WANTING TO TRANSFER CARE. PT STATED YES AND IS NEEDING A MASK FITTING. EXPLAINED TO PT THAT WE NO LONGER OFFER MASK FITTINGS BUT OUR COORDINATORS CAN REACH OUT TO HER AND CAN FIGURE OUT THE BEST FIT. PT STATES HER NOSE IS VERY SMALL AND HER CURRENT MASK BLOWS AIR UP INTO HER EYES AND GIVES HER INFECTIONS. PT IS ALSO ADAMENT FOR RE-EDUCATION ON CPAP, SPECIFICALLY CLEANING SUPPLIES. I EXPLAINED TO PT THAT I WILL HAVE A COORDINATOR REACH OUT TO HER AND THEY WILL FIGURE OUT WHAT WILL BE THE BEST MASK FOR HER.

## 2021-01-10 ENCOUNTER — COMMUNICATION - HEALTHEAST (OUTPATIENT)
Dept: INTERNAL MEDICINE | Facility: CLINIC | Age: 66
End: 2021-01-10

## 2021-01-11 RX ORDER — ROPINIROLE 2 MG/1
2-4 TABLET, FILM COATED ORAL AT BEDTIME
COMMUNITY
Start: 2021-01-06 | End: 2022-02-17

## 2021-01-11 NOTE — PROGRESS NOTES
Patient scheduled 3/4/21 for 8 week CPAP follow up. She will mail in her SD card 1 week prior.     Joy CANALES CMA, [facility], 1/11/2021 10:45 AM

## 2021-01-12 ENCOUNTER — PATIENT OUTREACH (OUTPATIENT)
Dept: ONCOLOGY | Facility: CLINIC | Age: 66
End: 2021-01-12

## 2021-01-12 NOTE — PROGRESS NOTES
Writer placed call to Winnie to review recent [employer] message with unclear goal. Winnie was recently taken off her medication that manages bipolar and anxiety due to serotonin syndrome. Conversation was difficult to track and her statements seemed to race from one topic to another, not having a clear line of logic tying the statements together and not related to her hematology care with Dr Hill. Writer asked how we can help her from the perspective of her care with our clinic. At the core, it seems that Winnie has had difficulty with tolerating oral iron though the manner in which she takes it may be contributing. We discussed continuing oral iron, OTC, and should try it at night with water, not orange juice as this may be irritating to her stomach. If still having GI discomfort, may try with a few saltine crackers. If still bothersome, she should call clinic back. Winnie is also overdue for her recommended EGD and colonoscopy to see if she is having any blood loss from GI tract to contribute to iron deficiency. We reviewed that IV iron will likely not be approved by insurance as she is not showing to be anemic per lab results in [facility] dated 1/6/21. Winnie voiced understanding of plan but during call wrap-up, line was disconnected as [employer] had interruption for a period of approximately 10 minutes intermittently. Medi[employer] response sent to Winnie, per prior understanding with recommendations and provided phone number for scheduling EGD/colonoscopy.

## 2021-01-13 ENCOUNTER — COMMUNICATION - HEALTHEAST (OUTPATIENT)
Dept: INTERNAL MEDICINE | Facility: CLINIC | Age: 66
End: 2021-01-13

## 2021-01-13 ENCOUNTER — VIRTUAL VISIT (OUTPATIENT)
Dept: PSYCHOLOGY | Facility: CLINIC | Age: 66
End: 2021-01-13
Payer: MEDICARE

## 2021-01-13 DIAGNOSIS — F41.1 GAD (GENERALIZED ANXIETY DISORDER): ICD-10-CM

## 2021-01-13 DIAGNOSIS — F33.1 MAJOR DEPRESSIVE DISORDER, RECURRENT EPISODE, MODERATE WITH ANXIOUS DISTRESS (H): Primary | ICD-10-CM

## 2021-01-13 PROCEDURE — 90834 PSYTX W PT 45 MINUTES: CPT | Mod: 95 | Performed by: SOCIAL WORKER

## 2021-01-13 NOTE — PATIENT INSTRUCTIONS
Tell providers at future appointments that they can interrupt you if it is taking too long to get to the question and the length of time may impact your care.

## 2021-01-13 NOTE — PROGRESS NOTES
Progress Note    Patient Name: Randi Cleary  Date: 1/13/21         Service Type: Individual      Session Start Time: 10:57 AM  Session End Time: 11:46 AM     Session Length: 49 minutes     Session #: 25    Attendees: Client attended alone    Service Modality:  Video Visit:    Telemedicine Visit: The patient's condition can be safely assessed and treated via synchronous audio and visual telemedicine encounter.      Reason for Telemedicine Visit: Services only offered telehealth    Originating Site (Patient Location): Patient's home    Distant Site (Provider Location): Provider Remote Setting    Consent:  The patient/guardian has verbally consented to: the potential risks and benefits of telemedicine (video visit) versus in person care; bill my insurance or make self-payment for services provided; and responsibility for payment of non-covered services.     Mode of Communication:  Video Conference via [employer]    As the provider I attest to compliance with applicable laws and regulations related to telemedicine.      Treatment Plan Last Reviewed: 12/15/2020  PHQ-9 / CHAVO-7 :   PHQ 11/24/2020 12/15/2020 1/13/2021   PHQ-9 Total Score 19 24 16   Q9: Thoughts of better off dead/self-harm past 2 weeks Several days Several days Not at all   F/U: Thoughts of suicide or self-harm Yes No -   F/U: Self harm-plan No - -   F/U: Self-harm action No - -   F/U: Safety concerns No No -     CHAVO-7 SCORE 11/24/2020 12/15/2020 1/13/2021   Total Score 17 (severe anxiety) 19 (severe anxiety) 10 (moderate anxiety)   Total Score 17 19 10         DATA  Interactive Complexity: No  Crisis: No       Progress Since Last Session (Related to Symptoms / Goals / Homework):   Symptoms: Improving patient is having fewer symptoms and notices that if she does have crying spells they are shorter than before    Homework: Achieved / completed to satisfaction  At next doctor's appointment request they go through  your problem list and update it     Episode of Care Goals: Minimal progress - ACTION (Actively working towards change); Intervened by reinforcing change plan / affirming steps taken     Current / Ongoing Stressors and Concerns:   Patient is currently socially isolated. She has a conflictual relationship with her .  She is getting minimal physical activity.      Treatment Objective(s) Addressed in This Session:   Patient will increase frequency of engaging her in ADLs.  Patient will track and record at least 5 pleasant exchanges with . Patient will be able to identify at least 5 positive traits about her .  Patient will reduce level of depressive and anxious features as evidenced by reduction in score on her CHAVO-7 and PHQ-9 (scores of 15 and 16 at first measurment, respectively).     Intervention:   Person-Centered Therapy:  Patient requested help with expressing herself, particularly navigating how to ask providers to update her medication list. Patient shares complications she has experienced with the medical system.  Patient reports struggling with concentration and memory in stressful situations. Discussed what some possible contributing factors could be with her problems navigating the system, suggested that frequently the length of time it takes for her to get to her question may end up causing problems.  Talked about asking providers to redirect her if necessary.  Patient shared about some of the positives in her life, including her  being more supportive.       ASSESSMENT: Current Emotional / Mental Status (status of significant symptoms):   Risk status (Self / Other harm or suicidal ideation)   Patient denies current fears or concerns for personal safety.   Patient denies current or recent suicidal ideation or behaviors.   Patient denies current or recent homicidal ideation or behaviors.   Patient denies current or recent self injurious behavior or ideation.   Patient denies other  safety concerns.   Patient reports there has been no change in risk factors since their last session.     Patient reports there has been no change in protective factors since their last session.     A safety and risk management plan has been developed including: Patient consented to co-developed safety plan.  Safety and risk management plan was completed.  Patient agreed to use safety plan should any safety concerns arise.  A copy was given to the patient. This was done in a previous session.     Appearance:   Appropriate    Eye Contact:   Fair    Psychomotor Behavior: Normal    Attitude:   Cooperative    Orientation:   All   Speech    Rate / Production: Normal/ Responsive    Volume:  Normal    Mood:    Hopeful   Affect:    Appropriate    Thought Content:  Clear    Thought Form:  Coherent    Insight:    Fair      Medication Review:   No current psychiatric medications prescribed     Medication Compliance:   Yes     Changes in Health Issues:   None reported     Chemical Use Review:   Substance Use: Chemical use reviewed, no active concerns identified      Tobacco Use: No current tobacco use.      Diagnosis:  1. Major depressive disorder, recurrent episode, moderate with anxious distress (H)    2. CHAVO (generalized anxiety disorder)      Collateral Reports Completed:   Not Applicable    PLAN: (Patient Tasks / Therapist Tasks / Other)  Tell providers at future appointments that they can interrupt you if it is taking too long to get to the question and the length of time may impact your care.    Next session continue trauma work (starting with grounding techniques)        MICAELA SLADE    January 13, 2021                                                         ______________________________________________________________________    Treatment Plan    Patient's Name: Randi Cleary  YOB: 1955    Date: 12/15/2020    DSM5 Diagnoses: 296.32 (F33.1) Major Depressive Disorder, Recurrent Episode, Moderate  "With anxious distress  Psychosocial / Contextual Factors: Patient's entire family of origin has , she now has a sister-in-law and  as support.  Relationship with  is conflictual.  Patient is reporting increase in physical symptoms due to COVID-19.  Patient is off of pain medications.  WHODAS: 42    Referral / Collaboration:  Referral to another professional/service is not indicated at this time.     Anticipated number of session or this episode of care: 12-15      MeasurableTreatment Goal(s) related to diagnosis / functional impairment(s)  Goal 1: Patient will \"jumpstart, getting going with the things I need to be doing around the house as far as picking up, doing things, trying to do something every day.  Also to lessen the animosity between me and my .\"    I will know I've met my goal when my shoulders are fixed and I can see.      Objective #A (Patient Action)    Patient will increase frequency of engaging her in ADLs.  Status: Continued - Date(s): 12/15/2020    Intervention(s)  Therapist will engage patient in CBT, specifically behavioral activation.    Objective #B  Patient will track and record at least 5 pleasant exchanges with . Patient will be able to identify at least 5 positive traits about her . and how he relates to her.  Status: Continued - Date(s): 12/15/2020    Intervention(s)  Therapist will teach assertiveness skills and assign homework related to relationship interactions.    Objective #C  Patient will reduce level of depressive and anxious features as evidenced by reduction in score on her CHAVO-7 and PHQ-9 (scores of 15 and 16 at first measurment, respectively).  Status: Continued - Date(s): 12/15/2020    Intervention(s)  Therapist will engage patient in person-centered therapy and CBT.    Patient has reviewed and agreed to the above plan.      MICAELA SLADE  December 15, 2020  "

## 2021-01-14 ENCOUNTER — TELEPHONE (OUTPATIENT)
Dept: GASTROENTEROLOGY | Facility: CLINIC | Age: 66
End: 2021-01-14

## 2021-01-14 DIAGNOSIS — Z11.59 ENCOUNTER FOR SCREENING FOR OTHER VIRAL DISEASES: Primary | ICD-10-CM

## 2021-01-14 DIAGNOSIS — R79.0 LOW FERRITIN: ICD-10-CM

## 2021-01-14 DIAGNOSIS — E83.119 HEMOCHROMATOSIS, UNSPECIFIED HEMOCHROMATOSIS TYPE: Primary | ICD-10-CM

## 2021-01-14 NOTE — TELEPHONE ENCOUNTER
Patient is scheduled for EGD and colonoscopy  with Dr. Hwang    Spoke with: Winnie Cleary    Date of Procedure: 2/3/2021    Location: [facility]    Sedation Type MAC    Pre-op for Unit J MAC not needed    (if yes advise patient they will need a pre-op prior to procedure)      Is patient on blood thinners? -no (If yes- inform patient to follow up with PCP or provider for follow up instructions)     Informed patient they will need an adult  yes    Informed Patient of COVID Test Requirement yes and transferred to [facility] for Pre Prescription on chart    Confirmed Nurse will call to complete assessment yes    Additional comments: no

## 2021-01-18 ENCOUNTER — VIRTUAL VISIT (OUTPATIENT)
Dept: PSYCHOLOGY | Facility: CLINIC | Age: 66
End: 2021-01-18
Payer: MEDICARE

## 2021-01-18 DIAGNOSIS — F41.1 GAD (GENERALIZED ANXIETY DISORDER): ICD-10-CM

## 2021-01-18 DIAGNOSIS — F33.1 MAJOR DEPRESSIVE DISORDER, RECURRENT EPISODE, MODERATE WITH ANXIOUS DISTRESS (H): Primary | ICD-10-CM

## 2021-01-18 PROCEDURE — 90834 PSYTX W PT 45 MINUTES: CPT | Mod: 95 | Performed by: SOCIAL WORKER

## 2021-01-18 NOTE — PROGRESS NOTES
"                                           Progress Note    Patient Name: Randi Cleary  Date: 1/18/21         Service Type: Individual      Session Start Time: 12:30 PM  Session End Time: 1:22 PM     Session Length: 52 minutes     Session #: 26    Attendees: Client attended alone    Service Modality:  Video Visit:    Telemedicine Visit: The patient's condition can be safely assessed and treated via synchronous audio and visual telemedicine encounter.      Reason for Telemedicine Visit: Services only offered telehealth    Originating Site (Patient Location): Patient's home    Distant Site (Provider Location): Provider Remote Setting    Consent:  The patient/guardian has verbally consented to: the potential risks and benefits of telemedicine (video visit) versus in person care; bill my insurance or make self-payment for services provided; and responsibility for payment of non-covered services.     Mode of Communication:  Video Conference via [employer]    As the provider I attest to compliance with applicable laws and regulations related to telemedicine.      Treatment Plan Last Reviewed: 12/15/2020  PHQ-9 / CHAVO-7 :   PHQ 11/24/2020 12/15/2020 1/13/2021   PHQ-9 Total Score 19 24 16   Q9: Thoughts of better off dead/self-harm past 2 weeks Several days Several days Not at all   F/U: Thoughts of suicide or self-harm Yes No -   F/U: Self harm-plan No - -   F/U: Self-harm action No - -   F/U: Safety concerns No No -     CHAVO-7 SCORE 11/24/2020 12/15/2020 1/13/2021   Total Score 17 (severe anxiety) 19 (severe anxiety) 10 (moderate anxiety)   Total Score 17 19 10         DATA  Interactive Complexity: No  Crisis: No       Progress Since Last Session (Related to Symptoms / Goals / Homework):   Symptoms: Improving patient reported that \"everything is wonderful\"    Homework: Achieved / completed to satisfaction  Tell providers at future appointments that they can interrupt you if it is taking too long to get to the question and " the length of time may impact your care. -no appointments to do this at thus far     Episode of Care Goals: Minimal progress - ACTION (Actively working towards change); Intervened by reinforcing change plan / affirming steps taken     Current / Ongoing Stressors and Concerns:   Patient is currently socially isolated. She has a conflictual relationship with her .  She is getting minimal physical activity.      Treatment Objective(s) Addressed in This Session:   Patient will increase frequency of engaging her in ADLs.  Patient will track and record at least 5 pleasant exchanges with . Patient will be able to identify at least 5 positive traits about her .  Patient will reduce level of depressive and anxious features as evidenced by reduction in score on her CHAVO-7 and PHQ-9 (scores of 15 and 16 at first measurment, respectively).     Intervention:   Person-Centered Therapy:  Processed her eye surgery and how this is going post-surgery. Talked about some of the positive changes in her life, including spending time with a friend, and having bought a new car. Talked about how she had one bad night, but she was able to utilize her strategy of using her oil to care for herself.  Discussed whether her good mood right now is a normal good mood or a manic state, she stated she has thought about this for awhile and thinks it is just a good mood. Patient shared that she notices her large reactions occur when she is anxious about something. Shared that she is anxious about some upcoming dispositions, broke it down to just the next step of meeting with her  rather than looking at the whole process right now. Talked about managing her multiple doctors and her considering an alterative PCP who is nearer to her, at the [facility]. Discussed steps to continue her progress, including starting to do some more cooking and cleaning.      ASSESSMENT: Current Emotional / Mental Status (status of significant  symptoms):   Risk status (Self / Other harm or suicidal ideation)   Patient denies current fears or concerns for personal safety.   Patient denies current or recent suicidal ideation or behaviors.   Patient denies current or recent homicidal ideation or behaviors.   Patient denies current or recent self injurious behavior or ideation.   Patient denies other safety concerns.   Patient reports there has been no change in risk factors since their last session.     Patient reports there has been no change in protective factors since their last session.     A safety and risk management plan has been developed including: Patient consented to co-developed safety plan.  Safety and risk management plan was completed.  Patient agreed to use safety plan should any safety concerns arise.  A copy was given to the patient. This was done in a previous session.     Appearance:   Appropriate    Eye Contact:   Fair    Psychomotor Behavior: Normal    Attitude:   Cooperative    Orientation:   All   Speech    Rate / Production: Normal/ Responsive    Volume:  Normal    Mood:    Hopeful   Affect:    Appropriate    Thought Content:  Clear    Thought Form:  Coherent    Insight:    Fair      Medication Review:   No current psychiatric medications prescribed     Medication Compliance:   Yes     Changes in Health Issues:   None reported     Chemical Use Review:   Substance Use: Chemical use reviewed, no active concerns identified      Tobacco Use: No current tobacco use.      Diagnosis:  1. Major depressive disorder, recurrent episode, moderate with anxious distress (H)    2. CHAVO (generalized anxiety disorder)      Collateral Reports Completed:   Not Applicable    PLAN: (Patient Tasks / Therapist Tasks / Other)  Get back to having a daily schedule, including cooking and cleaning regularly  Tell providers at future appointments that they can interrupt you if it is taking too long to get to the question and the length of time may impact your  "care.          B MICAELA BAKER    2021                                                         ______________________________________________________________________    Treatment Plan    Patient's Name: Randi Cleary  YOB: 1955    Date: 12/15/2020    DSM5 Diagnoses: 296.32 (F33.1) Major Depressive Disorder, Recurrent Episode, Moderate With anxious distress  Psychosocial / Contextual Factors: Patient's entire family of origin has , she now has a sister-in-law and  as support.  Relationship with  is conflictual.  Patient is reporting increase in physical symptoms due to COVID-19.  Patient is off of pain medications.  WHODAS: 42    Referral / Collaboration:  Referral to another professional/service is not indicated at this time.     Anticipated number of session or this episode of care: 12-15      MeasurableTreatment Goal(s) related to diagnosis / functional impairment(s)  Goal 1: Patient will \"jumpstart, getting going with the things I need to be doing around the house as far as picking up, doing things, trying to do something every day.  Also to lessen the animosity between me and my .\"    I will know I've met my goal when my shoulders are fixed and I can see.      Objective #A (Patient Action)    Patient will increase frequency of engaging her in ADLs.  Status: Continued - Date(s): 12/15/2020    Intervention(s)  Therapist will engage patient in CBT, specifically behavioral activation.    Objective #B  Patient will track and record at least 5 pleasant exchanges with . Patient will be able to identify at least 5 positive traits about her . and how he relates to her.  Status: Continued - Date(s): 12/15/2020    Intervention(s)  Therapist will teach assertiveness skills and assign homework related to relationship interactions.    Objective #C  Patient will reduce level of depressive and anxious features as evidenced by reduction in score on her " CHAVO-7 and PHQ-9 (scores of 15 and 16 at first measurment, respectively).  Status: Continued - Date(s): 12/15/2020    Intervention(s)  Therapist will engage patient in person-centered therapy and CBT.    Patient has reviewed and agreed to the above plan.      MICAELA SLADE  December 15, 2020

## 2021-01-18 NOTE — PATIENT INSTRUCTIONS
Get back to having a daily schedule, including cooking and cleaning regularly  Tell providers at future appointments that they can interrupt you if it is taking too long to get to the question and the length of time may impact your care.

## 2021-01-21 ENCOUNTER — TELEPHONE (OUTPATIENT)
Dept: PULMONOLOGY | Facility: CLINIC | Age: 66
End: 2021-01-21

## 2021-01-21 DIAGNOSIS — J44.9 CHRONIC OBSTRUCTIVE PULMONARY DISEASE, UNSPECIFIED COPD TYPE (H): Primary | ICD-10-CM

## 2021-01-21 RX ORDER — ALBUTEROL SULFATE 0.83 MG/ML
2.5 SOLUTION RESPIRATORY (INHALATION) EVERY 4 HOURS PRN
Qty: 360 ML | Refills: 5 | Status: SHIPPED | OUTPATIENT
Start: 2021-01-21 | End: 2022-06-24

## 2021-01-21 NOTE — TELEPHONE ENCOUNTER
[facility] Call Center    Phone Message    May a detailed message be left on voicemail: yes     Reason for Call: Medication Refill Request    Has the patient contacted the pharmacy for the refill? Yes   Name of medication being requested: Albuterol neb solutions and trilogy inhaler  Provider who prescribed the medication: Dr. Medrano  Pharmacy:[facility]  Date medication is needed: 01/21/2020         Action Taken: Message routed to:  [facility] ([facility] Pulm    Travel Screening: Not Applicable

## 2021-01-26 ENCOUNTER — RECORDS - HEALTHEAST (OUTPATIENT)
Dept: ADMINISTRATIVE | Facility: OTHER | Age: 66
End: 2021-01-26

## 2021-01-26 ENCOUNTER — VIRTUAL VISIT (OUTPATIENT)
Dept: PSYCHOLOGY | Facility: CLINIC | Age: 66
End: 2021-01-26
Payer: MEDICARE

## 2021-01-26 DIAGNOSIS — F41.1 GAD (GENERALIZED ANXIETY DISORDER): ICD-10-CM

## 2021-01-26 DIAGNOSIS — F33.1 MAJOR DEPRESSIVE DISORDER, RECURRENT EPISODE, MODERATE WITH ANXIOUS DISTRESS (H): Primary | ICD-10-CM

## 2021-01-26 PROCEDURE — 90834 PSYTX W PT 45 MINUTES: CPT | Mod: 95 | Performed by: SOCIAL WORKER

## 2021-01-26 NOTE — PATIENT INSTRUCTIONS
Get back to having a daily schedule, including cooking and cleaning regularly -just ten minutes a day if nothing else  Tell providers at future appointments that they can interrupt you if it is taking too long to get to the question and the length of time may impact your care. -keep this in mind for orthopedic appointment

## 2021-01-26 NOTE — PROGRESS NOTES
"                                           Progress Note    Patient Name: Randi Cleary  Date: 1/26/21         Service Type: Individual      Session Start Time: 3:09 PM  Session End Time: 3:51 PM     Session Length: 42 minutes     Session #: 27    Attendees: Client attended alone    Service Modality:  Video Visit:    Telemedicine Visit: The patient's condition can be safely assessed and treated via synchronous audio and visual telemedicine encounter.      Reason for Telemedicine Visit: Services only offered telehealth    Originating Site (Patient Location): Patient's home    Distant Site (Provider Location): Provider Remote Setting    Consent:  The patient/guardian has verbally consented to: the potential risks and benefits of telemedicine (video visit) versus in person care; bill my insurance or make self-payment for services provided; and responsibility for payment of non-covered services.     Mode of Communication:  Video Conference via [employer]    As the provider I attest to compliance with applicable laws and regulations related to telemedicine.      Treatment Plan Last Reviewed: 12/15/2020  PHQ-9 / CHAVO-7 :   PHQ 12/15/2020 1/13/2021 1/26/2021   PHQ-9 Total Score 24 16 17   Q9: Thoughts of better off dead/self-harm past 2 weeks Several days Not at all Not at all   F/U: Thoughts of suicide or self-harm No - -   F/U: Self harm-plan - - -   F/U: Self-harm action - - -   F/U: Safety concerns No - -     CHAVO-7 SCORE 12/15/2020 1/13/2021 1/26/2021   Total Score 19 (severe anxiety) 10 (moderate anxiety) 12 (moderate anxiety)   Total Score 19 10 12         DATA  Interactive Complexity: No  Crisis: No       Progress Since Last Session (Related to Symptoms / Goals / Homework):   Symptoms: Worsening patient feels that \"everything is going wrong\"    Homework: Did not complete  Get back to having a daily schedule, including cooking and cleaning regularly -not done  Tell providers at future appointments that they can " interrupt you if it is taking too long to get to the question and the length of time may impact your care.     Episode of Care Goals: Minimal progress - ACTION (Actively working towards change); Intervened by reinforcing change plan / affirming steps taken     Current / Ongoing Stressors and Concerns:   Patient is currently socially isolated. She has a conflictual relationship with her .  She is getting minimal physical activity.      Treatment Objective(s) Addressed in This Session:   Patient will increase frequency of engaging her in ADLs.  Patient will track and record at least 5 pleasant exchanges with . Patient will be able to identify at least 5 positive traits about her .  Patient will reduce level of depressive and anxious features as evidenced by reduction in score on her CHAVO-7 and PHQ-9 (scores of 15 and 16 at first measurment, respectively).     Intervention:   Person-Centered Therapy:  Processed patient's concerns with her medical appointment not going well.  Talked about her multiple medical concerns and managing them.  Coached patient through deep breathing and grounding herself for regulation.     ASSESSMENT: Current Emotional / Mental Status (status of significant symptoms):   Risk status (Self / Other harm or suicidal ideation)   Patient denies current fears or concerns for personal safety.   Patient denies current or recent suicidal ideation or behaviors.   Patient denies current or recent homicidal ideation or behaviors.   Patient denies current or recent self injurious behavior or ideation.   Patient denies other safety concerns.   Patient reports there has been no change in risk factors since their last session.     Patient reports there has been no change in protective factors since their last session.     A safety and risk management plan has been developed including: Patient consented to co-developed safety plan.  Safety and risk management plan was completed.  Patient  agreed to use safety plan should any safety concerns arise.  A copy was given to the patient. This was done in a previous session.     Appearance:   Appropriate    Eye Contact:   Fair    Psychomotor Behavior: Normal    Attitude:   Cooperative    Orientation:   All   Speech    Rate / Production: Normal/ Responsive    Volume:  Normal    Mood:    Depressed    Affect:    Tearful   Thought Content:  hyperfocused   Thought Form:  Obsessive    Insight:    Fair      Medication Review:   No current psychiatric medications prescribed     Medication Compliance:   Yes     Changes in Health Issues:   None reported     Chemical Use Review:   Substance Use: Chemical use reviewed, no active concerns identified      Tobacco Use: No current tobacco use.      Diagnosis:  1. Major depressive disorder, recurrent episode, moderate with anxious distress (H)    2. CHAVO (generalized anxiety disorder)      Collateral Reports Completed:   Not Applicable    PLAN: (Patient Tasks / Therapist Tasks / Other)  Get back to having a daily schedule, including cooking and cleaning regularly -just ten minutes a day if nothing else  Tell providers at future appointments that they can interrupt you if it is taking too long to get to the question and the length of time may impact your care. -keep this in mind for orthopedic appointment          MICAELA SLADE    2021                                                         ______________________________________________________________________    Treatment Plan    Patient's Name: Randi Cleary  YOB: 1955    Date: 12/15/2020    DSM5 Diagnoses: 296.32 (F33.1) Major Depressive Disorder, Recurrent Episode, Moderate With anxious distress  Psychosocial / Contextual Factors: Patient's entire family of origin has , she now has a sister-in-law and  as support.  Relationship with  is conflictual.  Patient is reporting increase in physical symptoms due to COVID-19.   "Patient is off of pain medications.  WHODAS: 42    Referral / Collaboration:  Referral to another professional/service is not indicated at this time.     Anticipated number of session or this episode of care: 12-15      MeasurableTreatment Goal(s) related to diagnosis / functional impairment(s)  Goal 1: Patient will \"jumpstart, getting going with the things I need to be doing around the house as far as picking up, doing things, trying to do something every day.  Also to lessen the animosity between me and my .\"    I will know I've met my goal when my shoulders are fixed and I can see.      Objective #A (Patient Action)    Patient will increase frequency of engaging her in ADLs.  Status: Continued - Date(s): 12/15/2020    Intervention(s)  Therapist will engage patient in CBT, specifically behavioral activation.    Objective #B  Patient will track and record at least 5 pleasant exchanges with . Patient will be able to identify at least 5 positive traits about her . and how he relates to her.  Status: Continued - Date(s): 12/15/2020    Intervention(s)  Therapist will teach assertiveness skills and assign homework related to relationship interactions.    Objective #C  Patient will reduce level of depressive and anxious features as evidenced by reduction in score on her CHAVO-7 and PHQ-9 (scores of 15 and 16 at first measurment, respectively).  Status: Continued - Date(s): 12/15/2020    Intervention(s)  Therapist will engage patient in person-centered therapy and CBT.    Patient has reviewed and agreed to the above plan.      MICAELA SLADE  December 15, 2020  "

## 2021-01-27 ENCOUNTER — COMMUNICATION - HEALTHEAST (OUTPATIENT)
Dept: NURSING | Facility: CLINIC | Age: 66
End: 2021-01-27

## 2021-01-28 ENCOUNTER — RECORDS - HEALTHEAST (OUTPATIENT)
Dept: ADMINISTRATIVE | Facility: OTHER | Age: 66
End: 2021-01-28

## 2021-01-29 ENCOUNTER — COMMUNICATION - HEALTHEAST (OUTPATIENT)
Dept: INTERNAL MEDICINE | Facility: CLINIC | Age: 66
End: 2021-01-29

## 2021-01-29 ENCOUNTER — OFFICE VISIT - HEALTHEAST (OUTPATIENT)
Dept: INTERNAL MEDICINE | Facility: CLINIC | Age: 66
End: 2021-01-29

## 2021-01-29 DIAGNOSIS — G47.33 SLEEP APNEA, OBSTRUCTIVE: ICD-10-CM

## 2021-01-29 DIAGNOSIS — I15.2 HYPERTENSION DUE TO ENDOCRINE DISORDER: ICD-10-CM

## 2021-01-29 DIAGNOSIS — R05.9 COUGH: ICD-10-CM

## 2021-01-29 DIAGNOSIS — Z01.818 PREOP GENERAL PHYSICAL EXAM: ICD-10-CM

## 2021-01-29 DIAGNOSIS — M54.2 NECK PAIN: ICD-10-CM

## 2021-01-29 DIAGNOSIS — F32.2 SEVERE MAJOR DEPRESSION (H): ICD-10-CM

## 2021-01-29 DIAGNOSIS — E11.9 TYPE 2 DIABETES MELLITUS WITHOUT COMPLICATION, WITHOUT LONG-TERM CURRENT USE OF INSULIN (H): ICD-10-CM

## 2021-01-29 DIAGNOSIS — J44.9 CHRONIC OBSTRUCTIVE PULMONARY DISEASE, UNSPECIFIED COPD TYPE (H): ICD-10-CM

## 2021-01-29 ASSESSMENT — MIFFLIN-ST. JEOR: SCORE: 1560.72

## 2021-02-01 ENCOUNTER — VIRTUAL VISIT (OUTPATIENT)
Dept: PSYCHOLOGY | Facility: CLINIC | Age: 66
End: 2021-02-01
Payer: MEDICARE

## 2021-02-01 ENCOUNTER — AMBULATORY - HEALTHEAST (OUTPATIENT)
Dept: LAB | Facility: CLINIC | Age: 66
End: 2021-02-01

## 2021-02-01 DIAGNOSIS — Z11.59 SCREENING FOR VIRAL DISEASE: ICD-10-CM

## 2021-02-01 DIAGNOSIS — F33.1 MAJOR DEPRESSIVE DISORDER, RECURRENT EPISODE, MODERATE WITH ANXIOUS DISTRESS (H): Primary | ICD-10-CM

## 2021-02-01 PROCEDURE — 90834 PSYTX W PT 45 MINUTES: CPT | Mod: 95 | Performed by: SOCIAL WORKER

## 2021-02-01 NOTE — PATIENT INSTRUCTIONS
Get back to having a daily schedule, including cooking and cleaning regularly -just ten minutes a day if nothing else  Tell providers at future appointments that they can interrupt you if it is taking too long to get to the question and the length of time may impact your care.

## 2021-02-02 ENCOUNTER — DOCUMENTATION ONLY (OUTPATIENT)
Dept: SLEEP MEDICINE | Facility: CLINIC | Age: 66
End: 2021-02-02

## 2021-02-02 ENCOUNTER — HOSPITAL ENCOUNTER (OUTPATIENT)
Dept: PALLIATIVE MEDICINE | Facility: OTHER | Age: 66
Discharge: HOME | End: 2021-02-02
Attending: ANESTHESIOLOGY

## 2021-02-02 DIAGNOSIS — G25.81 RESTLESS LEGS SYNDROME (RLS): ICD-10-CM

## 2021-02-02 ASSESSMENT — MIFFLIN-ST. JEOR: SCORE: 1541.72

## 2021-02-02 NOTE — PROGRESS NOTES
Patient came to [facility] for mask fitting appointment on February 2, 2021. Patient requested to switch masks because poor seal/leak. Discussed the following masks: AIRFIT N20, AIRFIT P10 Patient selected a Resmed Mask name: AIRFIT N20 Nasal mask size For Her.  PT RECEIVED ALL SUPPLIES AT THIS TIME.

## 2021-02-03 ENCOUNTER — MYC MEDICAL ADVICE (OUTPATIENT)
Dept: ONCOLOGY | Facility: CLINIC | Age: 66
End: 2021-02-03

## 2021-02-04 ENCOUNTER — RECORDS - HEALTHEAST (OUTPATIENT)
Dept: ADMINISTRATIVE | Facility: OTHER | Age: 66
End: 2021-02-04

## 2021-02-05 DIAGNOSIS — E89.0 POSTABLATIVE HYPOTHYROIDISM: ICD-10-CM

## 2021-02-05 DIAGNOSIS — E83.119 HEMOCHROMATOSIS, UNSPECIFIED HEMOCHROMATOSIS TYPE: ICD-10-CM

## 2021-02-05 DIAGNOSIS — E66.813 CLASS 3 SEVERE OBESITY WITH SERIOUS COMORBIDITY AND BODY MASS INDEX (BMI) OF 40.0 TO 44.9 IN ADULT, UNSPECIFIED OBESITY TYPE (H): ICD-10-CM

## 2021-02-05 DIAGNOSIS — E66.01 CLASS 3 SEVERE OBESITY WITH SERIOUS COMORBIDITY AND BODY MASS INDEX (BMI) OF 40.0 TO 44.9 IN ADULT, UNSPECIFIED OBESITY TYPE (H): ICD-10-CM

## 2021-02-05 DIAGNOSIS — J41.0 SIMPLE CHRONIC BRONCHITIS (H): ICD-10-CM

## 2021-02-05 DIAGNOSIS — I27.20 PULMONARY HYPERTENSION (H): ICD-10-CM

## 2021-02-05 DIAGNOSIS — R79.89 LOW TSH LEVEL: ICD-10-CM

## 2021-02-05 DIAGNOSIS — G47.33 OSA (OBSTRUCTIVE SLEEP APNEA): ICD-10-CM

## 2021-02-05 DIAGNOSIS — R06.02 SOB (SHORTNESS OF BREATH): ICD-10-CM

## 2021-02-05 LAB
ALBUMIN SERPL-MCNC: 3.9 G/DL (ref 3.4–5)
ALP SERPL-CCNC: 103 U/L (ref 40–150)
ALT SERPL W P-5'-P-CCNC: 32 U/L (ref 0–50)
ANION GAP SERPL CALCULATED.3IONS-SCNC: 8 MMOL/L (ref 3–14)
AST SERPL W P-5'-P-CCNC: 22 U/L (ref 0–45)
BASOPHILS # BLD AUTO: 0 10E9/L (ref 0–0.2)
BASOPHILS NFR BLD AUTO: 0.6 %
BILIRUB SERPL-MCNC: 0.6 MG/DL (ref 0.2–1.3)
BUN SERPL-MCNC: 12 MG/DL (ref 7–30)
CALCIUM SERPL-MCNC: 9.7 MG/DL (ref 8.5–10.1)
CHLORIDE SERPL-SCNC: 100 MMOL/L (ref 94–109)
CO2 SERPL-SCNC: 30 MMOL/L (ref 20–32)
CREAT SERPL-MCNC: 0.64 MG/DL (ref 0.52–1.04)
DIFFERENTIAL METHOD BLD: ABNORMAL
EOSINOPHIL # BLD AUTO: 0.1 10E9/L (ref 0–0.7)
EOSINOPHIL NFR BLD AUTO: 1.6 %
ERYTHROCYTE [DISTWIDTH] IN BLOOD BY AUTOMATED COUNT: 13.9 % (ref 10–15)
FERRITIN SERPL-MCNC: 48 NG/ML (ref 8–252)
GFR SERPL CREATININE-BSD FRML MDRD: >90 ML/MIN/{1.73_M2}
GLUCOSE SERPL-MCNC: 110 MG/DL (ref 70–99)
HCT VFR BLD AUTO: 50.6 % (ref 35–47)
HGB BLD-MCNC: 17.7 G/DL (ref 11.7–15.7)
IMM GRANULOCYTES # BLD: 0 10E9/L (ref 0–0.4)
IMM GRANULOCYTES NFR BLD: 0.4 %
IRON SATN MFR SERPL: 42 % (ref 15–46)
IRON SERPL-MCNC: 141 UG/DL (ref 35–180)
LYMPHOCYTES # BLD AUTO: 1.4 10E9/L (ref 0.8–5.3)
LYMPHOCYTES NFR BLD AUTO: 20.4 %
MCH RBC QN AUTO: 31.3 PG (ref 26.5–33)
MCHC RBC AUTO-ENTMCNC: 35 G/DL (ref 31.5–36.5)
MCV RBC AUTO: 89 FL (ref 78–100)
MONOCYTES # BLD AUTO: 0.6 10E9/L (ref 0–1.3)
MONOCYTES NFR BLD AUTO: 8.6 %
NEUTROPHILS # BLD AUTO: 4.7 10E9/L (ref 1.6–8.3)
NEUTROPHILS NFR BLD AUTO: 68.4 %
NRBC # BLD AUTO: 0 10*3/UL
NRBC BLD AUTO-RTO: 0 /100
NT-PROBNP SERPL-MCNC: 43 PG/ML (ref 0–125)
PLATELET # BLD AUTO: 252 10E9/L (ref 150–450)
POTASSIUM SERPL-SCNC: 3.9 MMOL/L (ref 3.4–5.3)
PROT SERPL-MCNC: 8 G/DL (ref 6.8–8.8)
RBC # BLD AUTO: 5.66 10E12/L (ref 3.8–5.2)
SODIUM SERPL-SCNC: 139 MMOL/L (ref 133–144)
T3 SERPL-MCNC: 130 NG/DL (ref 60–181)
T4 FREE SERPL-MCNC: 1.57 NG/DL (ref 0.76–1.46)
TIBC SERPL-MCNC: 335 UG/DL (ref 240–430)
TSH SERPL DL<=0.005 MIU/L-ACNC: 0.65 MU/L (ref 0.4–4)
WBC # BLD AUTO: 6.9 10E9/L (ref 4–11)

## 2021-02-05 PROCEDURE — 85025 COMPLETE CBC W/AUTO DIFF WBC: CPT | Performed by: PATHOLOGY

## 2021-02-05 PROCEDURE — 83540 ASSAY OF IRON: CPT | Performed by: PATHOLOGY

## 2021-02-05 PROCEDURE — 80053 COMPREHEN METABOLIC PANEL: CPT | Performed by: PATHOLOGY

## 2021-02-05 PROCEDURE — 82728 ASSAY OF FERRITIN: CPT | Performed by: PATHOLOGY

## 2021-02-05 PROCEDURE — 94375 RESPIRATORY FLOW VOLUME LOOP: CPT | Performed by: INTERNAL MEDICINE

## 2021-02-05 PROCEDURE — 84439 ASSAY OF FREE THYROXINE: CPT | Performed by: PATHOLOGY

## 2021-02-05 PROCEDURE — 36415 COLL VENOUS BLD VENIPUNCTURE: CPT | Performed by: PATHOLOGY

## 2021-02-05 PROCEDURE — 83880 ASSAY OF NATRIURETIC PEPTIDE: CPT | Performed by: PATHOLOGY

## 2021-02-05 PROCEDURE — 83550 IRON BINDING TEST: CPT | Performed by: PATHOLOGY

## 2021-02-05 PROCEDURE — 94729 DIFFUSING CAPACITY: CPT | Performed by: INTERNAL MEDICINE

## 2021-02-05 PROCEDURE — 84480 ASSAY TRIIODOTHYRONINE (T3): CPT | Performed by: INTERNAL MEDICINE

## 2021-02-05 NOTE — RESULT ENCOUNTER NOTE
She needs repeat labs in 2 weeks to decide if she does need phlebotomy or she was just dehydrated at the time of this lab draw.   Sara Hill   of Medicine   Hematology and medical Oncology   [facility]  \

## 2021-02-08 ENCOUNTER — PATIENT OUTREACH (OUTPATIENT)
Dept: ONCOLOGY | Facility: CLINIC | Age: 66
End: 2021-02-08

## 2021-02-08 ENCOUNTER — COMMUNICATION - HEALTHEAST (OUTPATIENT)
Dept: NURSING | Facility: CLINIC | Age: 66
End: 2021-02-08

## 2021-02-08 DIAGNOSIS — E83.119 HEMOCHROMATOSIS, UNSPECIFIED HEMOCHROMATOSIS TYPE: Primary | ICD-10-CM

## 2021-02-08 LAB
DLCOCOR-%PRED-PRE: 91 %
DLCOCOR-PRE: 19.29 ML/MIN/MMHG
DLCOUNC-%PRED-PRE: 101 %
DLCOUNC-PRE: 21.43 ML/MIN/MMHG
DLCOUNC-PRED: 21.04 ML/MIN/MMHG
ERV-%PRED-PRE: 208 %
ERV-PRE: 0.86 L
ERV-PRED: 0.41 L
EXPTIME-PRE: 7.65 SEC
FEF2575-%PRED-PRE: 74 %
FEF2575-PRE: 1.55 L/SEC
FEF2575-PRED: 2.07 L/SEC
FEFMAX-%PRED-PRE: 89 %
FEFMAX-PRE: 5.5 L/SEC
FEFMAX-PRED: 6.17 L/SEC
FEV1-%PRED-PRE: 90 %
FEV1-PRE: 2.22 L
FEV1FEV6-PRE: 71 %
FEV1FEV6-PRED: 80 %
FEV1FVC-PRE: 71 %
FEV1FVC-PRED: 78 %
FEV1SVC-PRE: 69 %
FEV1SVC-PRED: 73 %
FIFMAX-PRE: 4.79 L/SEC
FVC-%PRED-PRE: 99 %
FVC-PRE: 3.14 L
FVC-PRED: 3.16 L
IC-%PRED-PRE: 78 %
IC-PRE: 2.34 L
IC-PRED: 2.97 L
VA-%PRED-PRE: 90 %
VA-PRE: 4.66 L
VC-%PRED-PRE: 94 %
VC-PRE: 3.2 L
VC-PRED: 3.38 L

## 2021-02-09 DIAGNOSIS — R79.0 LOW FERRITIN LEVEL: ICD-10-CM

## 2021-02-09 DIAGNOSIS — E83.119 HEMOCHROMATOSIS, UNSPECIFIED HEMOCHROMATOSIS TYPE: Primary | ICD-10-CM

## 2021-02-09 NOTE — PROGRESS NOTES
I have had several e-mail communications about this patient due to her request for deeper level of sedation requiring a MAC/GA request.   I had a conversation with Dr Barrett from GI and I have place an order for Endoscopy and colonoscopy under MAC. Pt is to be seen in PAC due to her history of issues with sedation and also recent history of Serotonin syndrome.   She will be followed by my partners for further care due to my upcoming departure from the practice.   Sara Hill   of Medicine   Hematology and medical Oncology   [facility]

## 2021-02-10 ENCOUNTER — TELEPHONE (OUTPATIENT)
Dept: GASTROENTEROLOGY | Facility: CLINIC | Age: 66
End: 2021-02-10

## 2021-02-10 ENCOUNTER — TELEPHONE (OUTPATIENT)
Dept: HOME HEALTH SERVICES | Facility: CLINIC | Age: 66
End: 2021-02-10

## 2021-02-10 NOTE — TELEPHONE ENCOUNTER
1st schedule attempt    Procedure: Upper Endoscopy     Lower Endoscopy    Upper Endoscopy Sedation: Deep/MAC    Upper Endoscopy Reason for Procedure: Pt with hemochromatosis now with low ferritin without any therapy    Lower Endoscopy Type: Colonoscopy    Purpose of Colonoscopy Procedure: Screening    Colonoscopy Sedation: Deep/MAC    Preferred Location: [facility]    Scheduling Instructions: If you have not heard from the scheduling office within 2 business days, please call [phone].           Associated Diagnoses    Hemochromatosis, unspecified hemochromatosis type [E83.119]  - Primary       Low ferritin level [R79.0]

## 2021-02-11 ENCOUNTER — VIRTUAL VISIT (OUTPATIENT)
Dept: PSYCHOLOGY | Facility: CLINIC | Age: 66
End: 2021-02-11
Payer: MEDICARE

## 2021-02-11 DIAGNOSIS — F33.1 MAJOR DEPRESSIVE DISORDER, RECURRENT EPISODE, MODERATE WITH ANXIOUS DISTRESS (H): Primary | ICD-10-CM

## 2021-02-11 DIAGNOSIS — F41.1 GAD (GENERALIZED ANXIETY DISORDER): ICD-10-CM

## 2021-02-11 PROCEDURE — 90834 PSYTX W PT 45 MINUTES: CPT | Mod: 95 | Performed by: SOCIAL WORKER

## 2021-02-11 ASSESSMENT — ANXIETY QUESTIONNAIRES
3. WORRYING TOO MUCH ABOUT DIFFERENT THINGS: SEVERAL DAYS
4. TROUBLE RELAXING: MORE THAN HALF THE DAYS
7. FEELING AFRAID AS IF SOMETHING AWFUL MIGHT HAPPEN: SEVERAL DAYS
GAD7 TOTAL SCORE: 9
6. BECOMING EASILY ANNOYED OR IRRITABLE: MORE THAN HALF THE DAYS
5. BEING SO RESTLESS THAT IT IS HARD TO SIT STILL: SEVERAL DAYS
2. NOT BEING ABLE TO STOP OR CONTROL WORRYING: SEVERAL DAYS
1. FEELING NERVOUS, ANXIOUS, OR ON EDGE: SEVERAL DAYS
GAD7 TOTAL SCORE: 9
GAD7 TOTAL SCORE: 9
7. FEELING AFRAID AS IF SOMETHING AWFUL MIGHT HAPPEN: SEVERAL DAYS

## 2021-02-11 ASSESSMENT — PATIENT HEALTH QUESTIONNAIRE - PHQ9
SUM OF ALL RESPONSES TO PHQ QUESTIONS 1-9: 12
10. IF YOU CHECKED OFF ANY PROBLEMS, HOW DIFFICULT HAVE THESE PROBLEMS MADE IT FOR YOU TO DO YOUR WORK, TAKE CARE OF THINGS AT HOME, OR GET ALONG WITH OTHER PEOPLE: VERY DIFFICULT
SUM OF ALL RESPONSES TO PHQ QUESTIONS 1-9: 12

## 2021-02-11 NOTE — PROGRESS NOTES
Progress Note    Patient Name: Randi Cleary  Date: 2/11/21         Service Type: Individual      Session Start Time: 3:04 PM  Session End Time: 3:50 PM     Session Length: 46 minutes     Session #: 29    Attendees: Client attended alone    Service Modality:  Video Visit:    Telemedicine Visit: The patient's condition can be safely assessed and treated via synchronous audio and visual telemedicine encounter.      Reason for Telemedicine Visit: Services only offered telehealth    Originating Site (Patient Location): Patient's home    Distant Site (Provider Location): Provider Remote Setting    Consent:  The patient/guardian has verbally consented to: the potential risks and benefits of telemedicine (video visit) versus in person care; bill my insurance or make self-payment for services provided; and responsibility for payment of non-covered services.     Mode of Communication:  Video Conference via [employer]    As the provider I attest to compliance with applicable laws and regulations related to telemedicine.      Treatment Plan Last Reviewed: 12/15/2020  PHQ-9 / CHAVO-7 :   PHQ 1/13/2021 1/26/2021 2/11/2021   PHQ-9 Total Score 16 17 12   Q9: Thoughts of better off dead/self-harm past 2 weeks Not at all Not at all Not at all   F/U: Thoughts of suicide or self-harm - - -   F/U: Self harm-plan - - -   F/U: Self-harm action - - -   F/U: Safety concerns - - -     CHAVO-7 SCORE 1/13/2021 1/26/2021 2/11/2021   Total Score 10 (moderate anxiety) 12 (moderate anxiety) 9 (mild anxiety)   Total Score 10 12 9         DATA  Interactive Complexity: No  Crisis: No       Progress Since Last Session (Related to Symptoms / Goals / Homework):   Symptoms: Improving patient notices that she has had fewer incidences of irritability and anger outburts    Homework: Partially completed  Get back to having a daily schedule, including cooking and cleaning regularly -just ten minutes a day if nothing  else -patient has done some cooking and started on cleaning, she has taken before pictures of her kitchen  Tell providers at future appointments that they can interrupt you if it is taking too long to get to the question and the length of time may impact your care. -not yet done     Episode of Care Goals: Minimal progress - ACTION (Actively working towards change); Intervened by reinforcing change plan / affirming steps taken     Current / Ongoing Stressors and Concerns:   Ongoing: Patient is currently socially isolated. She has a conflictual relationship with her .  She is getting minimal physical activity.    Current: Patient had eye surgery yesterday. She has frustrations with having to clean her CPAP machine, finding it impossible to clean daily. She'd like a second one so she can clean them both every two days. She had an appointment with Dr. Dubois after our last appointment and this went really well. Since starting lithium orotate she is having a hard time falling asleep. Patient reports that she had spoken to her  about how sometimes it takes her awhile to get to the point and how we had discussed this may be leading to some of the problems she's having with providers. She learned they had talked about this before, and they had attributed it to the regine fog due to chemo as this was when the change was noticed. Patient reports having a hard time finding nouns at times.      Treatment Objective(s) Addressed in This Session:   Patient will increase frequency of engaging her in ADLs.  Patient will track and record at least 5 pleasant exchanges with . Patient will be able to identify at least 5 positive traits about her .  Patient will reduce level of depressive and anxious features as evidenced by reduction in score on her CHAVO-7 and PHQ-9 (scores of 15 and 16 at first measurment, respectively).     Intervention:     CBT:   Engaged in activity scheduling and behavioral activation,  looking at and reviewing the prior week's goals, problem solving any barriers and acknowledging successes, as well as setting new goals. This week was focused on ADLs.  Supportive Therapy:   Provided active listening, support, validation and encouragement. Provided reflection on how her challenges with communication could be shared with providers in a way to get further support.     ASSESSMENT: Current Emotional / Mental Status (status of significant symptoms):   Risk status (Self / Other harm or suicidal ideation)   Patient denies current fears or concerns for personal safety.   Patient denies current or recent suicidal ideation or behaviors.   Patient denies current or recent homicidal ideation or behaviors.   Patient denies current or recent self injurious behavior or ideation.   Patient denies other safety concerns.   Patient reports there has been no change in risk factors since their last session.     Patient reports there has been no change in protective factors since their last session.     A safety and risk management plan has been developed including: Patient consented to co-developed safety plan.  Safety and risk management plan was completed.  Patient agreed to use safety plan should any safety concerns arise.  A copy was given to the patient. This was done in a previous session.     Appearance:   Appropriate    Eye Contact:   Fair    Psychomotor Behavior: Normal    Attitude:   Cooperative    Orientation:   All   Speech    Rate / Production: Normal/ Responsive at times searching for words    Volume:  Normal    Mood:    Content   Affect:    Appropriate    Thought Content:  Clear    Thought Form:  Coherent    Insight:    Fair      Medication Review:   No current psychiatric medications prescribed     Medication Compliance:   Yes     Changes in Health Issues:   Yes: patient had eye surgery yesterday     Chemical Use Review:   Substance Use: Chemical use reviewed, no active concerns identified      Tobacco  "Use: No current tobacco use.      Diagnosis:  1. Major depressive disorder, recurrent episode, moderate with anxious distress (H)    2. CHAVO (generalized anxiety disorder)      Collateral Reports Completed:   Not Applicable    PLAN: (Patient Tasks / Therapist Tasks / Other)  Continue to implement a daily schedule, including cooking and cleaning regularly -just ten minutes a day if nothing else  Tell providers at future appointments that they can interrupt you if it is taking too long to get to the question and the length of time may impact your care, explain your challenges with communication.          CRUZ BAKER MICAELA ARELLANO    2021                                                         ______________________________________________________________________    Treatment Plan    Patient's Name: Randi Cleary  YOB: 1955    Date: 12/15/2020    DSM5 Diagnoses: 296.32 (F33.1) Major Depressive Disorder, Recurrent Episode, Moderate With anxious distress  Psychosocial / Contextual Factors: Patient's entire family of origin has , she now has a sister-in-law and  as support.  Relationship with  is conflictual.  Patient is reporting increase in physical symptoms due to COVID-19.  Patient is off of pain medications.  WHODAS: 42    Referral / Collaboration:  Referral to another professional/service is not indicated at this time.     Anticipated number of session or this episode of care: 12-15      MeasurableTreatment Goal(s) related to diagnosis / functional impairment(s)  Goal 1: Patient will \"jumpstart, getting going with the things I need to be doing around the house as far as picking up, doing things, trying to do something every day.  Also to lessen the animosity between me and my .\"    I will know I've met my goal when my shoulders are fixed and I can see.      Objective #A (Patient Action)    Patient will increase frequency of engaging her in ADLs.  Status: Continued - " Date(s): 12/15/2020    Intervention(s)  Therapist will engage patient in CBT, specifically behavioral activation.    Objective #B  Patient will track and record at least 5 pleasant exchanges with . Patient will be able to identify at least 5 positive traits about her . and how he relates to her.  Status: Continued - Date(s): 12/15/2020    Intervention(s)  Therapist will teach assertiveness skills and assign homework related to relationship interactions.    Objective #C  Patient will reduce level of depressive and anxious features as evidenced by reduction in score on her CHAVO-7 and PHQ-9 (scores of 15 and 16 at first measurment, respectively).  Status: Continued - Date(s): 12/15/2020    Intervention(s)  Therapist will engage patient in person-centered therapy and CBT.    Patient has reviewed and agreed to the above plan.      MICAELA SLADE  December 15, 2020

## 2021-02-12 DIAGNOSIS — Z11.59 ENCOUNTER FOR SCREENING FOR OTHER VIRAL DISEASES: Primary | ICD-10-CM

## 2021-02-12 ASSESSMENT — PATIENT HEALTH QUESTIONNAIRE - PHQ9: SUM OF ALL RESPONSES TO PHQ QUESTIONS 1-9: 12

## 2021-02-12 ASSESSMENT — ANXIETY QUESTIONNAIRES: GAD7 TOTAL SCORE: 9

## 2021-02-12 NOTE — PATIENT INSTRUCTIONS
Continue to implement a daily schedule, including cooking and cleaning regularly -just ten minutes a day if nothing else  Tell providers at future appointments that they can interrupt you if it is taking too long to get to the question and the length of time may impact your care, explain your challenges with communication.

## 2021-02-18 ENCOUNTER — COMMUNICATION - HEALTHEAST (OUTPATIENT)
Dept: INTERNAL MEDICINE | Facility: CLINIC | Age: 66
End: 2021-02-18

## 2021-02-19 ENCOUNTER — VIRTUAL VISIT (OUTPATIENT)
Dept: PSYCHOLOGY | Facility: CLINIC | Age: 66
End: 2021-02-19
Payer: MEDICARE

## 2021-02-19 DIAGNOSIS — F41.1 GAD (GENERALIZED ANXIETY DISORDER): ICD-10-CM

## 2021-02-19 DIAGNOSIS — F33.1 MAJOR DEPRESSIVE DISORDER, RECURRENT EPISODE, MODERATE WITH ANXIOUS DISTRESS (H): Primary | ICD-10-CM

## 2021-02-19 PROCEDURE — 90834 PSYTX W PT 45 MINUTES: CPT | Mod: 95 | Performed by: SOCIAL WORKER
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
## 2021-02-22 ENCOUNTER — VIRTUAL VISIT (OUTPATIENT)
Dept: PSYCHOLOGY | Facility: CLINIC | Age: 66
End: 2021-02-22
Payer: MEDICARE

## 2021-02-22 ENCOUNTER — AMBULATORY - HEALTHEAST (OUTPATIENT)
Dept: LAB | Facility: CLINIC | Age: 66
End: 2021-02-22

## 2021-02-22 DIAGNOSIS — Z11.59 SCREENING FOR VIRAL DISEASE: ICD-10-CM

## 2021-02-22 DIAGNOSIS — F33.1 MAJOR DEPRESSIVE DISORDER, RECURRENT EPISODE, MODERATE WITH ANXIOUS DISTRESS (H): Primary | ICD-10-CM

## 2021-02-22 DIAGNOSIS — F41.1 GAD (GENERALIZED ANXIETY DISORDER): ICD-10-CM

## 2021-02-22 PROCEDURE — 90834 PSYTX W PT 45 MINUTES: CPT | Mod: 95 | Performed by: SOCIAL WORKER

## 2021-02-22 ASSESSMENT — PATIENT HEALTH QUESTIONNAIRE - PHQ9
10. IF YOU CHECKED OFF ANY PROBLEMS, HOW DIFFICULT HAVE THESE PROBLEMS MADE IT FOR YOU TO DO YOUR WORK, TAKE CARE OF THINGS AT HOME, OR GET ALONG WITH OTHER PEOPLE: SOMEWHAT DIFFICULT
SUM OF ALL RESPONSES TO PHQ QUESTIONS 1-9: 10
SUM OF ALL RESPONSES TO PHQ QUESTIONS 1-9: 10

## 2021-02-22 NOTE — PROGRESS NOTES
Progress Note    Patient Name: Randi Cleary  Date: 2/22/21         Service Type: Individual      Session Start Time: 4:00 PM  Session End Time: 4:49 PM     Session Length: 49 minutes     Session #: 31    Attendees: Client attended alone    Service Modality:  Video Visit:    Telemedicine Visit: The patient's condition can be safely assessed and treated via synchronous audio and visual telemedicine encounter.      Reason for Telemedicine Visit: Services only offered telehealth    Originating Site (Patient Location): Patient's home    Distant Site (Provider Location): Provider Remote Setting    Consent:  The patient/guardian has verbally consented to: the potential risks and benefits of telemedicine (video visit) versus in person care; bill my insurance or make self-payment for services provided; and responsibility for payment of non-covered services.     Mode of Communication:  Video Conference via [employer]    As the provider I attest to compliance with applicable laws and regulations related to telemedicine.      Treatment Plan Last Reviewed: 12/15/2020  PHQ-9 / CHAVO-7 :   PHQ 1/26/2021 2/11/2021 2/22/2021   PHQ-9 Total Score 17 12 10   Q9: Thoughts of better off dead/self-harm past 2 weeks Not at all Not at all Not at all   F/U: Thoughts of suicide or self-harm - - -   F/U: Self harm-plan - - -   F/U: Self-harm action - - -   F/U: Safety concerns - - -     CHAVO-7 SCORE 1/13/2021 1/26/2021 2/11/2021   Total Score 10 (moderate anxiety) 12 (moderate anxiety) 9 (mild anxiety)   Total Score 10 12 9         DATA  Interactive Complexity: No  Crisis: No       Progress Since Last Session (Related to Symptoms / Goals / Homework):   Symptoms: Improving patient appears to be stablized    Homework: Achieved / completed to satisfaction  Continue to implement a daily schedule, including cooking and cleaning regularly -just ten minutes a day if nothing else  Tell providers at future  appointments that they can interrupt you if it is taking too long to get to the question and the length of time may impact your care, explain your challenges with communication.     Episode of Care Goals: Satisfactory progress - ACTION (Actively working towards change); Intervened by reinforcing change plan / affirming steps taken     Current / Ongoing Stressors and Concerns:   Patient is currently socially isolated. She has a conflictual relationship with her .  She is getting minimal physical activity.  She had recent eye surgery     Treatment Objective(s) Addressed in This Session:   Patient will increase frequency of engaging her in ADLs.  Patient will track and record at least 5 pleasant exchanges with . Patient will be able to identify at least 5 positive traits about her .  Patient will reduce level of depressive and anxious features as evidenced by reduction in score on her CHAVO-7 and PHQ-9 (scores of 15 and 16 at first measurment, respectively).     Intervention:   CBT: Person-Centered Therapy: Patient had a positive experience with the healthcare system recently. She's noticed she has no bladder control with her newer medication. She's now looking back on her 20 years on medications and wondering what it would have been if she weren't on them. She's also reflecting on her house and how she's doing better at getting things straightened up, it became disorganized again but has hope she can get it reorganized.  She is anxious about her upcoming colonoscopy.     Discussed how her hope for her future is building up. She talked about how she's finally now facing some of the hopeless thoughts she had a few months ago that she wasn't ready to face or admit to herself at the time. Discussed how she has shifted her thinking and is not as negative as she was before. Discussed how regular times scheduled at the pool help manage her mood and she plans to return to that in a few weeks as her eyes  continue to heal.     ASSESSMENT: Current Emotional / Mental Status (status of significant symptoms):   Risk status (Self / Other harm or suicidal ideation)   Patient denies current fears or concerns for personal safety.   Patient denies current or recent suicidal ideation or behaviors.   Patient denies current or recent homicidal ideation or behaviors.   Patient denies current or recent self injurious behavior or ideation.   Patient denies other safety concerns.   Patient reports there has been no change in risk factors since their last session.     Patient reports there has been no change in protective factors since their last session.     A safety and risk management plan has been developed including: Patient consented to co-developed safety plan.  Safety and risk management plan was completed.  Patient agreed to use safety plan should any safety concerns arise.  A copy was given to the patient. This was done in a previous session.     Appearance:   Appropriate    Eye Contact:   Fair    Psychomotor Behavior: Normal    Attitude:   Cooperative    Orientation:   All   Speech    Rate / Production: Normal/ Responsive    Volume:  Normal    Mood:    Content   Affect:    Appropriate    Thought Content:  Clear    Thought Form:  Coherent    Insight:    Fair      Medication Review:   No current psychiatric medications prescribed     Medication Compliance:   Yes     Changes in Health Issues:   None reported     Chemical Use Review:   Substance Use: Chemical use reviewed, no active concerns identified      Tobacco Use: No current tobacco use.      Diagnosis:  1. Major depressive disorder, recurrent episode, moderate with anxious distress (H)    2. CHAVO (generalized anxiety disorder)      Collateral Reports Completed:   Not Applicable    PLAN: (Patient Tasks / Therapist Tasks / Other)  Focus on getting through Thursday and recovering.    After this, we will focus on self-care, including the routine of getting the pool and  "keeping up on things around the house.          B MICAELA BAKER    2021                                                         ______________________________________________________________________    Treatment Plan    Patient's Name: Randi Cleary  YOB: 1955    Date: 12/15/2020    DSM5 Diagnoses: 296.32 (F33.1) Major Depressive Disorder, Recurrent Episode, Moderate With anxious distress  Psychosocial / Contextual Factors: Patient's entire family of origin has , she now has a sister-in-law and  as support.  Relationship with  is conflictual.  Patient is reporting increase in physical symptoms due to COVID-19.  Patient is off of pain medications.  WHODAS: 42    Referral / Collaboration:  Referral to another professional/service is not indicated at this time.     Anticipated number of session or this episode of care: 12-15      MeasurableTreatment Goal(s) related to diagnosis / functional impairment(s)  Goal 1: Patient will \"jumpstart, getting going with the things I need to be doing around the house as far as picking up, doing things, trying to do something every day.  Also to lessen the animosity between me and my .\"    I will know I've met my goal when my shoulders are fixed and I can see.      Objective #A (Patient Action)    Patient will increase frequency of engaging her in ADLs.  Status: Continued - Date(s): 12/15/2020    Intervention(s)  Therapist will engage patient in CBT, specifically behavioral activation.    Objective #B  Patient will track and record at least 5 pleasant exchanges with . Patient will be able to identify at least 5 positive traits about her . and how he relates to her.  Status: Continued - Date(s): 12/15/2020    Intervention(s)  Therapist will teach assertiveness skills and assign homework related to relationship interactions.    Objective #C  Patient will reduce level of depressive and anxious features as evidenced " by reduction in score on her CHAVO-7 and PHQ-9 (scores of 15 and 16 at first measurment, respectively).  Status: Continued - Date(s): 12/15/2020    Intervention(s)  Therapist will engage patient in person-centered therapy and CBT.    Patient has reviewed and agreed to the above plan.      MICAELA SLADE  December 15, 2020

## 2021-02-22 NOTE — PATIENT INSTRUCTIONS
Focus on getting through Thursday and recovering.    After this, we will focus on self-care, including the routine of getting the pool and keeping up on things around the house.

## 2021-02-23 LAB
SARS-COV-2 PCR COMMENT: NORMAL
SARS-COV-2 RNA SPEC QL NAA+PROBE: NEGATIVE
SARS-COV-2 VIRUS SPECIMEN SOURCE: NORMAL

## 2021-02-23 ASSESSMENT — PATIENT HEALTH QUESTIONNAIRE - PHQ9: SUM OF ALL RESPONSES TO PHQ QUESTIONS 1-9: 10

## 2021-02-24 ENCOUNTER — COMMUNICATION - HEALTHEAST (OUTPATIENT)
Dept: SCHEDULING | Facility: CLINIC | Age: 66
End: 2021-02-24

## 2021-02-24 ENCOUNTER — COMMUNICATION - HEALTHEAST (OUTPATIENT)
Dept: NURSING | Facility: CLINIC | Age: 66
End: 2021-02-24

## 2021-02-25 ENCOUNTER — HOSPITAL ENCOUNTER (OUTPATIENT)
Facility: CLINIC | Age: 66
Discharge: HOME | End: 2021-02-25
Attending: INTERNAL MEDICINE | Admitting: INTERNAL MEDICINE
Payer: MEDICARE

## 2021-02-25 ENCOUNTER — ANESTHESIA EVENT (OUTPATIENT)
Dept: GASTROENTEROLOGY | Facility: CLINIC | Age: 66
End: 2021-02-25
Payer: MEDICARE

## 2021-02-25 ENCOUNTER — ANESTHESIA (OUTPATIENT)
Dept: GASTROENTEROLOGY | Facility: CLINIC | Age: 66
End: 2021-02-25
Payer: MEDICARE

## 2021-02-25 VITALS
HEART RATE: 109 BPM | WEIGHT: 222 LBS | OXYGEN SATURATION: 95 % | HEIGHT: 66 IN | SYSTOLIC BLOOD PRESSURE: 151 MMHG | BODY MASS INDEX: 35.68 KG/M2 | RESPIRATION RATE: 16 BRPM | DIASTOLIC BLOOD PRESSURE: 97 MMHG | TEMPERATURE: 98.3 F

## 2021-02-25 LAB
GLUCOSE BLDC GLUCOMTR-MCNC: 108 MG/DL (ref 70–99)
UPPER GI ENDOSCOPY: NORMAL

## 2021-02-25 PROCEDURE — 258N000003 HC RX IP 258 OP 636: Performed by: NURSE ANESTHETIST, CERTIFIED REGISTERED

## 2021-02-25 PROCEDURE — 45378 DIAGNOSTIC COLONOSCOPY: CPT | Performed by: INTERNAL MEDICINE

## 2021-02-25 PROCEDURE — 370N000017 HC ANESTHESIA TECHNICAL FEE, PER MIN: Performed by: INTERNAL MEDICINE

## 2021-02-25 PROCEDURE — 88305 TISSUE EXAM BY PATHOLOGIST: CPT | Mod: 26 | Performed by: PATHOLOGY

## 2021-02-25 PROCEDURE — 250N000009 HC RX 250: Performed by: NURSE ANESTHETIST, CERTIFIED REGISTERED

## 2021-02-25 PROCEDURE — 43239 EGD BIOPSY SINGLE/MULTIPLE: CPT | Performed by: INTERNAL MEDICINE

## 2021-02-25 PROCEDURE — 250N000011 HC RX IP 250 OP 636: Performed by: NURSE ANESTHETIST, CERTIFIED REGISTERED

## 2021-02-25 PROCEDURE — 88305 TISSUE EXAM BY PATHOLOGIST: CPT | Mod: TC | Performed by: INTERNAL MEDICINE

## 2021-02-25 PROCEDURE — 82962 GLUCOSE BLOOD TEST: CPT

## 2021-02-25 RX ORDER — NALOXONE HYDROCHLORIDE 0.4 MG/ML
0.2 INJECTION, SOLUTION INTRAMUSCULAR; INTRAVENOUS; SUBCUTANEOUS
Status: DISCONTINUED | OUTPATIENT
Start: 2021-02-25 | End: 2021-02-25 | Stop reason: HOSPADM

## 2021-02-25 RX ORDER — PROPOFOL 10 MG/ML
INJECTION, EMULSION INTRAVENOUS CONTINUOUS PRN
Status: DISCONTINUED | OUTPATIENT
Start: 2021-02-25 | End: 2021-02-25

## 2021-02-25 RX ORDER — LIDOCAINE HYDROCHLORIDE 20 MG/ML
INJECTION, SOLUTION INFILTRATION; PERINEURAL PRN
Status: DISCONTINUED | OUTPATIENT
Start: 2021-02-25 | End: 2021-02-25

## 2021-02-25 RX ORDER — LIDOCAINE HYDROCHLORIDE 20 MG/ML
SOLUTION OROPHARYNGEAL PRN
Status: DISCONTINUED | OUTPATIENT
Start: 2021-02-25 | End: 2021-02-25

## 2021-02-25 RX ORDER — SODIUM CHLORIDE, SODIUM LACTATE, POTASSIUM CHLORIDE, CALCIUM CHLORIDE 600; 310; 30; 20 MG/100ML; MG/100ML; MG/100ML; MG/100ML
INJECTION, SOLUTION INTRAVENOUS CONTINUOUS PRN
Status: DISCONTINUED | OUTPATIENT
Start: 2021-02-25 | End: 2021-02-25

## 2021-02-25 RX ORDER — NALOXONE HYDROCHLORIDE 0.4 MG/ML
0.4 INJECTION, SOLUTION INTRAMUSCULAR; INTRAVENOUS; SUBCUTANEOUS
Status: DISCONTINUED | OUTPATIENT
Start: 2021-02-25 | End: 2021-02-25 | Stop reason: HOSPADM

## 2021-02-25 RX ORDER — ONDANSETRON 2 MG/ML
4 INJECTION INTRAMUSCULAR; INTRAVENOUS EVERY 6 HOURS PRN
Status: DISCONTINUED | OUTPATIENT
Start: 2021-02-25 | End: 2021-02-25 | Stop reason: HOSPADM

## 2021-02-25 RX ORDER — ONDANSETRON 2 MG/ML
INJECTION INTRAMUSCULAR; INTRAVENOUS PRN
Status: DISCONTINUED | OUTPATIENT
Start: 2021-02-25 | End: 2021-02-25

## 2021-02-25 RX ORDER — ONDANSETRON 4 MG/1
4 TABLET, ORALLY DISINTEGRATING ORAL EVERY 6 HOURS PRN
Status: DISCONTINUED | OUTPATIENT
Start: 2021-02-25 | End: 2021-02-25 | Stop reason: HOSPADM

## 2021-02-25 RX ORDER — FLUMAZENIL 0.1 MG/ML
0.2 INJECTION, SOLUTION INTRAVENOUS
Status: DISCONTINUED | OUTPATIENT
Start: 2021-02-25 | End: 2021-02-25 | Stop reason: HOSPADM

## 2021-02-25 RX ORDER — PROCHLORPERAZINE MALEATE 5 MG
5 TABLET ORAL EVERY 6 HOURS PRN
Status: DISCONTINUED | OUTPATIENT
Start: 2021-02-25 | End: 2021-02-25 | Stop reason: HOSPADM

## 2021-02-25 RX ORDER — FENTANYL CITRATE 50 UG/ML
INJECTION, SOLUTION INTRAMUSCULAR; INTRAVENOUS PRN
Status: DISCONTINUED | OUTPATIENT
Start: 2021-02-25 | End: 2021-02-25

## 2021-02-25 RX ADMIN — FENTANYL CITRATE 50 MCG: 50 INJECTION, SOLUTION INTRAMUSCULAR; INTRAVENOUS at 13:28

## 2021-02-25 RX ADMIN — LIDOCAINE HYDROCHLORIDE 5 ML: 20 SOLUTION ORAL; TOPICAL at 13:25

## 2021-02-25 RX ADMIN — ONDANSETRON 4 MG: 2 INJECTION INTRAMUSCULAR; INTRAVENOUS at 13:30

## 2021-02-25 RX ADMIN — FENTANYL CITRATE 25 MCG: 50 INJECTION, SOLUTION INTRAMUSCULAR; INTRAVENOUS at 13:41

## 2021-02-25 RX ADMIN — TOPICAL ANESTHETIC 1 SPRAY: 200 SPRAY DENTAL; PERIODONTAL at 13:25

## 2021-02-25 RX ADMIN — FENTANYL CITRATE 25 MCG: 50 INJECTION, SOLUTION INTRAMUSCULAR; INTRAVENOUS at 13:53

## 2021-02-25 RX ADMIN — PROPOFOL 100 MCG/KG/MIN: 10 INJECTION, EMULSION INTRAVENOUS at 13:27

## 2021-02-25 RX ADMIN — MIDAZOLAM 2 MG: 1 INJECTION INTRAMUSCULAR; INTRAVENOUS at 13:19

## 2021-02-25 RX ADMIN — SODIUM CHLORIDE, POTASSIUM CHLORIDE, SODIUM LACTATE AND CALCIUM CHLORIDE: 600; 310; 30; 20 INJECTION, SOLUTION INTRAVENOUS at 13:23

## 2021-02-25 RX ADMIN — LIDOCAINE HYDROCHLORIDE 40 MG: 20 INJECTION, SOLUTION INFILTRATION; PERINEURAL at 13:26

## 2021-02-25 ASSESSMENT — MIFFLIN-ST. JEOR: SCORE: 1558.75

## 2021-02-25 NOTE — OR NURSING
Pt tolerated EGD and colonoscopy very well under MAC. Biopsies taken from stomach and duodenum. Colonoscopy required mno interventions

## 2021-02-25 NOTE — ANESTHESIA CARE TRANSFER NOTE
Patient: Randi Cleary    Procedure(s):  ESOPHAGOGASTRODUODENOSCOPY, WITH BIOPSY  COLONOSCOPY, WITH POLYPECTOMY AND BIOPSY    Diagnosis: Hemochromatosis, unspecified hemochromatosis type [E83.119]  Diagnosis Additional Information: No value filed.    Anesthesia Type:   No value filed.     Note:    Oropharynx: oropharynx clear of all foreign objects and spontaneously breathing  Level of Consciousness: awake  Oxygen Supplementation: room air    Independent Airway: airway patency satisfactory and stable  Dentition: dentition unchanged  Vital Signs Stable: post-procedure vital signs reviewed and stable  Report to RN Given: handoff report given  Patient transferred to: PACU    Handoff Report: Identifed the Patient, Identified the Reponsible Provider, Reviewed the pertinent medical history, Discussed the surgical course, Reviewed Intra-OP anesthesia mangement and issues during anesthesia, Set expectations for post-procedure period and Allowed opportunity for questions and acknowledgement of understanding      Vitals: (Last set prior to Anesthesia Care Transfer)  CRNA VITALS  2/25/2021 1339 - 2/25/2021 1416      2/25/2021             Resp Rate (observed):  (!) 1        Electronically Signed By: DION Darden CRNA  February 25, 2021  2:16 PM

## 2021-02-25 NOTE — ANESTHESIA POSTPROCEDURE EVALUATION
Patient: Randi Cleary    Procedure(s):  ESOPHAGOGASTRODUODENOSCOPY, WITH BIOPSY  COLONOSCOPY    Diagnosis:Hemochromatosis, unspecified hemochromatosis type [E83.119]  Diagnosis Additional Information: No value filed.    Anesthesia Type:  No value filed.    Note:  Disposition: Outpatient   Postop Pain Control: Uneventful            Sign Out: Well controlled pain   PONV:    Neuro/Psych: Uneventful            Sign Out: Acceptable/Baseline neuro status   Airway/Respiratory: Uneventful            Sign Out: Acceptable/Baseline resp. status   CV/Hemodynamics: Uneventful            Sign Out: Acceptable CV status   Other NRE:    DID A NON-ROUTINE EVENT OCCUR?          Last vitals:  Vitals:    02/25/21 1424 02/25/21 1425 02/25/21 1426   BP:      Pulse:      Resp:      Temp:      SpO2: 96% 98% 97%       Last vitals prior to Anesthesia Care Transfer:  CRNA VITALS  2/25/2021 1339 - 2/25/2021 1439      2/25/2021             Resp Rate (observed):  16    EKG:  Sinus rhythm          Electronically Signed By: Teodora Redd MD  February 25, 2021  3:13 PM

## 2021-02-25 NOTE — DISCHARGE INSTRUCTIONS
Discharge Instructions after  Upper Endoscopy (EGD) with biopsies    Activity and Diet  You were given medicine for pain. You may be dizzy or sleepy.  For 24 hours:    Do not drive or use heavy equipment.    Do not make important decisions.    Do not drink any alcohol.  ___ You may return to your regular diet.    Discomfort  You may have a sore throat for 2 to 3 days. It may help to:    Avoid hot liquids for 24 hours.    Use sore throat lozenges.    Gargle as needed with salt water up to 4 times a day. Mix 1 cup of warm water  with 1 teaspoon of salt. Do not swallow.  ___ Your esophagus was dilated (opened) or banded during the exam:    Drink only cool liquids for the rest of the day. Eat a soft diet for the next few days.    You may have a sore chest for 2 to 3 days.    You may take Tylenol (acetaminophen) for pain unless your doctor has told you not to.    Do not take aspirin or ibuprofen (Advil, Motrin) or other NSAIDS  (anti-inflammatory drugs) for ___ days.    Follow-up  _X__ We took small tissue samples for study. If you do not have a follow-up visit scheduled,  call your provider s office in 2 weeks for the results.        When to call us:  Problems are rare. Call right away if you have:    Unusual throat pain or trouble swallowing    Unusual pain in belly or chest that is not relieved by belching or passing air    Black stools (tar-like looking bowel movement)    Temperature above 100.6  F. (37.5  C).    If you vomit blood or have severe pain, go to an emergency room.    If you have questions, call:  Monday to Friday, 8 a.m. to 4:30 p.m.: Endoscopy: [phone] (We may have to call you back)    After hours: Hospital: [phone] (Ask for the GI fellow on call)Discharge Instructions after Colonoscopy      Today you had a __X__ Colonoscopy    Activity and Diet  You were given medicine for pain. You may be dizzy or sleepy.  For 24 hours:    Do not drive or use heavy equipment.    Do not make important  decisions.    Do not drink any alcohol.  You may return to your normal diet and medicines.    Discomfort    Air was placed in your colon during the exam in order to see it. Walking helps to pass the air.    You may take Tylenol (acetaminophen) for pain unless your doctor has told you not to.  Do not take aspirin or ibuprofen (Advil, Motrin, or other anti-inflammatory  drugs) for _____ days.    Follow-up    When to call:    Call right away if you have:    Unusual pain in belly or chest pain not relieved with passing air.    More than 1 to 2 Tablespoons of bleeding from your rectum.    Fever above 100.6  F (37.5  C).    If you have severe pain, bleeding, or shortness of breath, go to an emergency room.    If you have questions, call:  Monday to Friday, 8 a.m. to 4:30 p.m.  Endoscopy: [phone] (We may have to call you back)    After hours  Hospital: [phone] (Ask for the GI fellow on call)

## 2021-02-25 NOTE — ANESTHESIA PREPROCEDURE EVALUATION
Anesthesia Pre-Procedure Evaluation    Patient: Randi Cleary   MRN: [id number] : 1955        Preoperative Diagnosis: Hemochromatosis, unspecified hemochromatosis type [E83.119]   Procedure : Procedure(s):  ESOPHAGOGASTRODUODENOSCOPY, WITH BIOPSY  COLONOSCOPY     Past Medical History:   Diagnosis Date     Anxiety      Bipolar disorder (H)      Breast cancer (H)     lumpectomy, radiation, chemo     COPD (chronic obstructive pulmonary disease) (H)     asthma     Depression, major, recurrent (H)      Drug tolerance     opioid     Esophageal reflux      Fatigue      Graves disease      Hemochromatosis 2018    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; H63D not detected     Hyperlipidaemia      Hypertension      Impaired fasting glucose 2017     Joint pain      Morbid obesity (H)      KYAW (obstructive sleep apnea) 2016     Osteopenia      Pheochromocytoma, left 2017    laparoscopically removed     Postablative hypothyroidism      Psoriasis      Psoriatic arthropathy (H)      RLS (restless legs syndrome)      Sacroiliitis (H)      Snoring      Spinal stenosis      Vitamin B 12 deficiency      Vitamin D deficiency       Past Surgical History:   Procedure Laterality Date     ARTHRODESIS ANKLE       ARTHROPLASTY ANKLE Right 2015    Procedure: ARTHROPLASTY ANKLE;  Surgeon: Jason Coughlin MD;  Location: [facility]     ARTHROPLASTY REVISION ANKLE Right 2015    Procedure: ARTHROPLASTY REVISION ANKLE;  Surgeon: Jason Coughlin MD;  Location: [facility]     BREAST BIOPSY, CORE RT/LT       COLONOSCOPY       CV CORONARY ANGIOGRAM N/A 10/3/2019    Procedure: CV CORONARY ANGIOGRAM;  Surgeon: Bryce Pierre MD;  Location:  [facility]     CV RIGHT HEART CATH MEASUREMENTS RECORDED N/A 10/3/2019    Procedure: CV RIGHT HEART CATH;  Surgeon: Bryce Pierre MD;  Location:  [facility]     LAPAROSCOPIC ADRENALECTOMY Left 2017    pheochromocytoma     LENGTHEN  "TENDON ACHILLES Right 2015    Procedure: LENGTHEN TENDON ACHILLES;  Surgeon: Jason Coughlin MD;  Location: [facility]     LUMPECTOMY BREAST       MAMMOPLASTY REDUCTION Right 2015    [place]     MASTECTOMY MODIFIED RADICAL       REPAIR HAMMER TOE Right 2015    Procedure: REPAIR HAMMER TOE;  Surgeon: Jason Coughlin MD;  Location: [facility]     TONSILLECTOMY & ADENOIDECTOMY        Allergies   Allergen Reactions     Gabapentin      Drove on the wrong side of the highway     Levofloxacin      \"CAN'T REMEMBER\"     Penicillins      \"SORES IN MOUTH\"     Riluzole Difficulty breathing and Swelling     Sulfa Drugs      \"PT DOES NOT KNOW WHAT THE REACTION WAS\"      Social History     Tobacco Use     Smoking status: Former Smoker     Packs/day: 2.50     Years: 20.00     Pack years: 50.00     Types: Cigarettes     Quit date: 2000     Years since quittin.6     Smokeless tobacco: Never Used   Substance Use Topics     Alcohol use: No     Comment: relapse 2019 sober       Wt Readings from Last 1 Encounters:   21 100.7 kg (222 lb 0.1 oz)              OUTSIDE LABS:  CBC:   Lab Results   Component Value Date    WBC 6.9 2021    WBC 7.1 10/30/2020    HGB 17.7 (H) 2021    HGB 15.4 10/30/2020    HCT 50.6 (H) 2021    HCT 47.5 (H) 10/30/2020     2021     10/30/2020     BMP:   Lab Results   Component Value Date     2021     10/30/2020    POTASSIUM 3.9 2021    POTASSIUM 4.0 10/30/2020    CHLORIDE 100 2021    CHLORIDE 107 10/30/2020    CO2 30 2021    CO2 26 10/30/2020    BUN 12 2021    BUN 11 10/30/2020    CR 0.64 2021    CR 0.63 10/30/2020     (H) 2021     (H) 10/30/2020     COAGS: No results found for: PTT, INR, FIBR  POC:   Lab Results   Component Value Date     (H) 2021     HEPATIC:   Lab Results   Component Value Date    ALBUMIN 3.9 2021    PROTTOTAL 8.0 2021    ALT 32 " 02/05/2021    AST 22 02/05/2021    ALKPHOS 103 02/05/2021    BILITOTAL 0.6 02/05/2021     OTHER:   Lab Results   Component Value Date    A1C 6.1 (H) 10/30/2020    LINDA 9.7 02/05/2021    TSH 0.65 02/05/2021    T4 1.57 (H) 02/05/2021    T3 130 02/05/2021       Anesthesia Plan    ASA Status:  3      Anesthesia Type: MAC.   Induction: Intravenous.           Consents    Anesthesia Plan(s) and associated risks, benefits, and realistic alternatives discussed. Questions answered and patient/representative(s) expressed understanding.     - Discussed with:  Patient         Postoperative Care            Comments:                Teodora Redd MD   Spontaneous, unlabored and symmetrical

## 2021-02-26 LAB
COLONOSCOPY: NORMAL
COPATH REPORT: NORMAL

## 2021-03-01 ENCOUNTER — RECORDS - HEALTHEAST (OUTPATIENT)
Dept: ADMINISTRATIVE | Facility: OTHER | Age: 66
End: 2021-03-01

## 2021-03-02 ENCOUNTER — PATIENT OUTREACH (OUTPATIENT)
Dept: GASTROENTEROLOGY | Facility: CLINIC | Age: 66
End: 2021-03-02

## 2021-03-02 ENCOUNTER — HOSPITAL ENCOUNTER (OUTPATIENT)
Dept: PALLIATIVE MEDICINE | Facility: OTHER | Age: 66
Discharge: HOME | End: 2021-03-02
Attending: ANESTHESIOLOGY

## 2021-03-02 DIAGNOSIS — M54.12 CERVICAL RADICULOPATHY: ICD-10-CM

## 2021-03-02 NOTE — TELEPHONE ENCOUNTER
"Post EGD/Colonoscopy (2/25/21) with Dr. Tolbert: Follow-up    Post procedure recommendations:   Duodenal bulb polyp was consistent with tubular adenoma. This was completely removed. Multiple polyps in stomach were consistent with fundic gland hyperplasia     Will recommend surveillance colonoscopy in 3-5 years                        as before (multiple polyps were removed during last                        colonoscopy in 2017)                                                  Orders placed: none    Patient states: Per patient, had covid vaccine after procedure, \"having a hard time with that\", wondering if anesthesia could affect that, no other concerning symptoms. Reviewed above recommendations, questions answered.      Clinic contact and scheduling numbers verified for future questions/concerns.    Katrina Fields, RN Care Coordinator        "

## 2021-03-03 ENCOUNTER — VIRTUAL VISIT (OUTPATIENT)
Dept: PSYCHOLOGY | Facility: CLINIC | Age: 66
End: 2021-03-03
Payer: MEDICARE

## 2021-03-03 DIAGNOSIS — F33.1 MAJOR DEPRESSIVE DISORDER, RECURRENT EPISODE, MODERATE WITH ANXIOUS DISTRESS (H): Primary | ICD-10-CM

## 2021-03-03 DIAGNOSIS — F41.1 GAD (GENERALIZED ANXIETY DISORDER): ICD-10-CM

## 2021-03-03 PROCEDURE — 90834 PSYTX W PT 45 MINUTES: CPT | Mod: 95 | Performed by: SOCIAL WORKER

## 2021-03-03 NOTE — PROGRESS NOTES
Progress Note    Patient Name: Randi Cleary  Date: 3/3/21         Service Type: Individual      Session Start Time: 3:00 PM  Session End Time: 3:49 PM     Session Length: 49 minutes     Session #: 32    Attendees: Client attended alone    Service Modality:  Video Visit:    Telemedicine Visit: The patient's condition can be safely assessed and treated via synchronous audio and visual telemedicine encounter.      Reason for Telemedicine Visit: Services only offered telehealth    Originating Site (Patient Location): Patient's home    Distant Site (Provider Location): Provider Remote Setting    Consent:  The patient/guardian has verbally consented to: the potential risks and benefits of telemedicine (video visit) versus in person care; bill my insurance or make self-payment for services provided; and responsibility for payment of non-covered services.     Mode of Communication:  Video Conference via [email or web address]    As the provider I attest to compliance with applicable laws and regulations related to telemedicine.      Treatment Plan Last Reviewed: 12/15/2020  PHQ-9 / CHAVO-7 :   PHQ 1/26/2021 2/11/2021 2/22/2021   PHQ-9 Total Score 17 12 10   Q9: Thoughts of better off dead/self-harm past 2 weeks Not at all Not at all Not at all   F/U: Thoughts of suicide or self-harm - - -   F/U: Self harm-plan - - -   F/U: Self-harm action - - -   F/U: Safety concerns - - -     CHAVO-7 SCORE 1/13/2021 1/26/2021 2/11/2021   Total Score 10 (moderate anxiety) 12 (moderate anxiety) 9 (mild anxiety)   Total Score 10 12 9         DATA  Interactive Complexity: No  Crisis: No       Progress Since Last Session (Related to Symptoms / Goals / Homework):   Symptoms: patient's mental health is stabilized    Homework: Achieved / completed to satisfaction  Focus on getting through Thursday and recovering.       Episode of Care Goals: Satisfactory progress - ACTION (Actively working towards change); Intervened  by reinforcing change plan / affirming steps taken     Current / Ongoing Stressors and Concerns:   Patient is currently socially isolated. She has a conflictual relationship with her .  She is getting minimal physical activity.  She had recent eye surgery     Treatment Objective(s) Addressed in This Session:   Patient will increase frequency of engaging her in ADLs.  Patient will track and record at least 5 pleasant exchanges with . Patient will be able to identify at least 5 positive traits about her .  Patient will reduce level of depressive and anxious features as evidenced by reduction in score on her CHAVO-7 and PHQ-9 (scores of 15 and 16 at first measurment, respectively).     Intervention:   CBT: Person-Centered Therapy: Patient had her colonoscopy Thursday, then got a phone call that she was eligible for the vaccine and received it on Saturday. It was a very confusing and frustrating experience. Discussed some frustrations with ongoing medical concerns. Also expressed frustration with her relationships and uncertainty around them. Agreed it would help to take some space with her .      ASSESSMENT: Current Emotional / Mental Status (status of significant symptoms):   Risk status (Self / Other harm or suicidal ideation)   Patient denies current fears or concerns for personal safety.   Patient denies current or recent suicidal ideation or behaviors.   Patient denies current or recent homicidal ideation or behaviors.   Patient denies current or recent self injurious behavior or ideation.   Patient denies other safety concerns.   Patient reports there has been no change in risk factors since their last session.     Patient reports there has been no change in protective factors since their last session.     A safety and risk management plan has been developed including: Patient consented to co-developed safety plan.  Safety and risk management plan was completed.  Patient agreed to use  safety plan should any safety concerns arise.  A copy was given to the patient. This was done in a previous session.     Appearance:   Appropriate    Eye Contact:   Fair    Psychomotor Behavior: Normal    Attitude:   Cooperative    Orientation:   All   Speech    Rate / Production: Normal/ Responsive    Volume:  Normal    Mood:    Anxious    Affect:    Appropriate    Thought Content:  Clear    Thought Form:  Coherent    Insight:    Fair      Medication Review:   No current psychiatric medications prescribed     Medication Compliance:   Yes     Changes in Health Issues:   Yes: pain in arm and shoulder     Chemical Use Review:   Substance Use: Chemical use reviewed, no active concerns identified      Tobacco Use: No current tobacco use.      Diagnosis:  1. Major depressive disorder, recurrent episode, moderate with anxious distress (H)    2. CHAVO (generalized anxiety disorder)      Collateral Reports Completed:   Not Applicable    PLAN: (Patient Tasks / Therapist Tasks / Other)  Get into own room for space    After this, we will focus on self-care, including the routine of getting the pool and keeping up on things around the house.          MICAELA SLADE    March 3, 2021                                                         ______________________________________________________________________    Treatment Plan    Patient's Name: Randi Cleary  YOB: 1955    Date: 12/15/2020    DSM5 Diagnoses: 296.32 (F33.1) Major Depressive Disorder, Recurrent Episode, Moderate With anxious distress  Psychosocial / Contextual Factors: Patient's entire family of origin has , she now has a sister-in-law and  as support.  Relationship with  is conflictual.  Patient is reporting increase in physical symptoms due to COVID-19.  Patient is off of pain medications.  WHODAS: 42    Referral / Collaboration:  Referral to another professional/service is not indicated at this time.     Anticipated number  "of session or this episode of care: 12-15      MeasurableTreatment Goal(s) related to diagnosis / functional impairment(s)  Goal 1: Patient will \"jumpstart, getting going with the things I need to be doing around the house as far as picking up, doing things, trying to do something every day.  Also to lessen the animosity between me and my .\"    I will know I've met my goal when my shoulders are fixed and I can see.      Objective #A (Patient Action)    Patient will increase frequency of engaging her in ADLs.  Status: Continued - Date(s): 12/15/2020    Intervention(s)  Therapist will engage patient in CBT, specifically behavioral activation.    Objective #B  Patient will track and record at least 5 pleasant exchanges with . Patient will be able to identify at least 5 positive traits about her . and how he relates to her.  Status: Continued - Date(s): 12/15/2020    Intervention(s)  Therapist will teach assertiveness skills and assign homework related to relationship interactions.    Objective #C  Patient will reduce level of depressive and anxious features as evidenced by reduction in score on her CHAVO-7 and PHQ-9 (scores of 15 and 16 at first measurment, respectively).  Status: Continued - Date(s): 12/15/2020    Intervention(s)  Therapist will engage patient in person-centered therapy and CBT.    Patient has reviewed and agreed to the above plan.      MICAELA SLADE  December 15, 2020  "

## 2021-03-04 ENCOUNTER — VIRTUAL VISIT (OUTPATIENT)
Dept: SLEEP MEDICINE | Facility: CLINIC | Age: 66
End: 2021-03-04
Payer: MEDICARE

## 2021-03-04 VITALS — HEIGHT: 66 IN | WEIGHT: 220 LBS | BODY MASS INDEX: 35.36 KG/M2

## 2021-03-04 DIAGNOSIS — G47.33 OSA (OBSTRUCTIVE SLEEP APNEA): Primary | ICD-10-CM

## 2021-03-04 DIAGNOSIS — G47.00 INSOMNIA, UNSPECIFIED TYPE: ICD-10-CM

## 2021-03-04 PROCEDURE — 99214 OFFICE O/P EST MOD 30 MIN: CPT | Mod: 95 | Performed by: INTERNAL MEDICINE

## 2021-03-04 ASSESSMENT — MIFFLIN-ST. JEOR: SCORE: 1549.66

## 2021-03-04 NOTE — PATIENT INSTRUCTIONS
[place] with turning down the humidity to decrease nasal drainage  Can try an over the counter nasal anti inflammatory spray such as fluticasone or mometasone two sprays each nostril once a day for 4 weeks  Keep using the eye mask  Stop using Amanda and try more gently cleaning fluid such as Carmen or Ivory or Baby Shampoo  Pressure change should go through in the next couple days    For general sleep health questions:   http://sleepeducation.org    For tips about PAP and COVID-19:  https://www.thoracic.org/patients/patient-resources/resources/covid-19-and-home-pap-therapy.pdf    For general info about COVID-19 including vaccines:  [email or web address]        Continue PAP therapy every night, for all hours that you are sleeping (including naps.)  As always, try to get at least 8 hours of sleep or more each day, keep a regular sleep schedule, and avoid sleep deprivation. Avoid alcohol.    Reasons that you might need a change to your pressure therapy would be weight gain or loss, waking having inadvertently removed your PAP overnight, having previously felt refreshed by sleep with CPAP use and now waking un-refreshed, and return of daytime sleepiness. Also, the development of new medical problems  (such as heart failure, stroke, medications such as narcotics) can sometimes affect breathing at night and change your PAP therapy needs.    Please bring PAP with you if you are hospitalized.  If anticipating surgery be sure to discuss with your surgeon that you have sleep apnea and use PAP therapy.      Maintain your equipment as recommended which includes routine cleaning and replacement of supplies.      Call DME for any questions regarding supplies or maintenance.    [facility], [facility] [phone]      Do not drive on engage in potentially dangerous activities if feeling sleepy.    Please follow up in sleep clinic again in 3 months.        Tips for your PAP  use-    Mask fitting tips  Mask fitting exercise:    To improve your mask seal and your mobility at night, put mask on and secure in place.  Lie down in bed with full pressure and roll to one side, adjust headgear while in that position to eliminate any leaks. Repeat process rolling to other side.     The mask seal does not have to be perfect:   CPAP machines are designed to make up for small leaks. However, you will not tolerate leaks blowing in your eyes so you will need to adjust.   Any leak should only be near or at the bottom of the mask.  We expect your mask to leak slightly at night.    Do not over-tighten the headgear straps, tighter IS NOT better, we expect minimal leak.    First try re-positioning the mask or headgear before tightening the headgear straps.  Mask leaks are expected due to changing sleeping positions. Try pulling the mask away from your skin allowing the cushion to re-inflate will minimize the leak.  If you struggle for a good fit, try turning the CPAP off and then readjust the mask by pulling it away from your face and then turning back on the CPAP.        Humidifier tips  Humidifiers can be adjusted to increase or decrease the amount of moisture according to your comfort level. You may need to adjust this frequently at first, but then might only change it with seasonal weather changes.     Try INCREASING the humidity if:  You experience a dry, irritated nasal passage or throat.  You have a runny, drippy nose or sneezing fits after using CPAP.  You experience nasal congestion during or after CPAP use.    Try DECREASING the humidity if:  You have excessive condensation or  rain out  in the tubing or mask.  Otherwise keep the tubing warm during the night by running it underneath the blankets or pillow.      Clinic visit after initial PAP set-up   Bring your equipment with you to your 5-8 week follow up clinic visit.  We will be extracting your data from the machine if not available from the  cloud based mode.        Travel  Always take your equipment with you when you travel.  If you fly with your equipment bring it on with you as a carry on.  Medical equipment does not count as a carry on.    If you travel international the machines take 110-240v.  The only adapter needed is the adapter that will fit into the receptacle (outlet).    You may also want to bring an extension cord as many Hospitals in [place] rooms have limited outlets at the bedside.  Do not travel with water in your humidifier chamber.     Cleaning and Maintenance Guidelines    Equipment Frequency Cleaning Method   Mask First Day    Daily      Weekly Soak mask in hot soapy water for 30 minutes, rinse and air dry.  Wipe nasal cushion with a hot soapy (Ivory, baby shampoo) cloth and rinse.  Baby wipes may also be used.  Do not use anti-bacterial soaps,Amanda  liquid soap, rubbing alcohol, bleach or ammonia.  Wash frame in hot soapy water (Ivory, baby shampoo) rinse and let air dry   Headgear Biweekly Wash in hot soapy water, rinse and air dry   Reusable Gray Filter Weekly Wash in hot soapy water, rinse, put in towel squeeze moisture out, let air dry   Disposable White Filter Check Weekly Replace when brown or gray in color; at least every 2 to 3 months   Humidifier Chamber Daily    Weekly Empty distilled water from humidifier and let air dry    Hand wash in hot soapy water, rinse and air dry   Tubing Weekly Wash in hot soapy water, rinse and let air dry   Mask, Tubing and Humidifier Chamber As needed Disinfect: Soak in 1 part distilled white vinegar to 3 parts hot water for 30 minutes, rinse well and air dry  Not the material headgear        MASK AND SUPPLY REORDERING and EQUIPMENT NEEDS through your DME and per your insurance  Reminder: Most insurance companies will allow for a new mask, headgear, tubing, and reusable gray filter every six months.  Disposable white ultra-fine filters are covered monthly.      HOME AND SAFETY INSTRUCTIONS    Do not use  frayed or cracked electrical cords, multi plug adaptors, or switched receptacles    Do not immerse electrical equipment into water    Assure that electrical cords do not become a tripping hazard      Your BMI is Body mass index is 35.51 kg/m .  Weight management is a personal decision.  If you are interested in exploring weight loss strategies, the following discussion covers the approaches that may be successful. Body mass index (BMI) is one way to tell whether you are at a healthy weight, overweight, or obese. It measures your weight in relation to your height.  A BMI of 18.5 to 24.9 is in the healthy range. A person with a BMI of 25 to 29.9 is considered overweight, and someone with a BMI of 30 or greater is considered obese. More than two-thirds of American adults are considered overweight or obese.  Being overweight or obese increases the risk for further weight gain. Excess weight may lead to heart disease and diabetes.  Creating and following plans for healthy eating and physical activity may help you improve your health.  Weight control is part of healthy lifestyle and includes exercise, emotional health, and healthy eating habits. Careful eating habits lifelong are the mainstay of weight control. Though there are significant health benefits from weight loss, long-term weight loss with diet alone may be very difficult to achieve- studies show long-term success with dietary management in less than 10% of people. Attaining a healthy weight may be especially difficult to achieve in those with severe obesity. In some cases, medications, devices and surgical management might be considered.  What can you do?  If you are overweight or obese and are interested in methods for weight loss, you should discuss this with your provider.     Consider reducing daily calorie intake by 500 calories.     Keep a food journal.     Avoiding skipping meals, consider cutting portions instead.    Diet combined with exercise helps  maintain muscle while optimizing fat loss. Strength training is particularly important for building and maintaining muscle mass. Exercise helps reduce stress, increase energy, and improves fitness. Increasing exercise without diet control, however, may not burn enough calories to loose weight.       Start walking three days a week 10-20 minutes at a time    Work towards walking thirty minutes five days a week     Eventually, increase the speed of your walking for 1-2 minutes at time    In addition, we recommend that you review healthy lifestyles and methods for weight loss available through the National Institutes of Health patient information sites:  http://win.niddk.nih.gov/publications/index.htm    And look into health and wellness programs that may be available through your health insurance provider, employer, local community center, or [employer].    Weight management plan: Patient was referred to their PCP to discuss a diet and exercise plan.
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
## 2021-03-05 ENCOUNTER — COMMUNICATION - HEALTHEAST (OUTPATIENT)
Dept: INTERNAL MEDICINE | Facility: CLINIC | Age: 66
End: 2021-03-05

## 2021-03-08 ENCOUNTER — VIRTUAL VISIT (OUTPATIENT)
Dept: PSYCHOLOGY | Facility: CLINIC | Age: 66
End: 2021-03-08
Payer: MEDICARE

## 2021-03-08 DIAGNOSIS — F33.1 MAJOR DEPRESSIVE DISORDER, RECURRENT EPISODE, MODERATE WITH ANXIOUS DISTRESS (H): Primary | ICD-10-CM

## 2021-03-08 DIAGNOSIS — I27.20 PULMONARY HYPERTENSION (H): Primary | ICD-10-CM

## 2021-03-08 DIAGNOSIS — F41.1 GAD (GENERALIZED ANXIETY DISORDER): ICD-10-CM

## 2021-03-08 DIAGNOSIS — R06.02 SOB (SHORTNESS OF BREATH): ICD-10-CM

## 2021-03-08 PROCEDURE — 90834 PSYTX W PT 45 MINUTES: CPT | Mod: 95 | Performed by: SOCIAL WORKER
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
## 2021-03-09 ENCOUNTER — TELEPHONE (OUTPATIENT)
Dept: PULMONOLOGY | Facility: CLINIC | Age: 66
End: 2021-03-09

## 2021-03-09 NOTE — TELEPHONE ENCOUNTER
Spoke with patient about scheduling a follow up appt with Dr. Medrano as records indicate she is due for a visit. I did inform her that Dr. Medrano will be going on on leave next week and won't be back until June, but if she felt as though she needed to be seen, we could get her scheduled for another provider. Patient states she is okay with waiting until June and would like to hold off on scheduling for the time being and will contact us if she needs an appt. Informed her I would send her a [facility] message with contact information and she voiced understanding.

## 2021-03-11 ENCOUNTER — DOCUMENTATION ONLY (OUTPATIENT)
Dept: SLEEP MEDICINE | Facility: CLINIC | Age: 66
End: 2021-03-11

## 2021-03-11 NOTE — PROGRESS NOTES
STM recheck per provider          Message left for patient to return call     Assessment:   No new PAP data last usage date is  2/6/21    Action plan: waiting for patient to return call.  and 2 week STM recheck appt scheduled                  Mask type:  Nasal Mask           Total time spent on accessing and interpreting remote patient PAP therapy data  10 minutes    Total time spent counseling, coaching  and reviewing PAP therapy data with patient  0 minutes

## 2021-03-11 NOTE — Clinical Note
Hi just a heads up.  Just left message for her.  She has not plugged in her device since  2/6/21 so it has not picked up the pressure change.  I scheduled her for another recheck  I hope she calls back.    Bonnie

## 2021-03-12 ENCOUNTER — COMMUNICATION - HEALTHEAST (OUTPATIENT)
Dept: NURSING | Facility: CLINIC | Age: 66
End: 2021-03-12

## 2021-03-12 ENCOUNTER — OFFICE VISIT - HEALTHEAST (OUTPATIENT)
Dept: INTERNAL MEDICINE | Facility: CLINIC | Age: 66
End: 2021-03-12

## 2021-03-12 DIAGNOSIS — E11.9 TYPE 2 DIABETES MELLITUS WITHOUT COMPLICATION, WITHOUT LONG-TERM CURRENT USE OF INSULIN (H): ICD-10-CM

## 2021-03-12 DIAGNOSIS — E56.9 VITAMIN DEFICIENCY: ICD-10-CM

## 2021-03-12 DIAGNOSIS — M25.511 CHRONIC PAIN OF BOTH SHOULDERS: ICD-10-CM

## 2021-03-12 DIAGNOSIS — M25.512 CHRONIC PAIN OF BOTH SHOULDERS: ICD-10-CM

## 2021-03-12 DIAGNOSIS — G25.81 RESTLESS LEGS SYNDROME (RLS): ICD-10-CM

## 2021-03-12 DIAGNOSIS — M48.02 CERVICAL STENOSIS OF SPINAL CANAL: ICD-10-CM

## 2021-03-12 DIAGNOSIS — E89.0 POSTABLATIVE HYPOTHYROIDISM: ICD-10-CM

## 2021-03-12 DIAGNOSIS — R19.5 LOOSE STOOLS: ICD-10-CM

## 2021-03-12 DIAGNOSIS — E55.9 VITAMIN D DEFICIENCY: ICD-10-CM

## 2021-03-12 DIAGNOSIS — G89.29 CHRONIC PAIN OF BOTH SHOULDERS: ICD-10-CM

## 2021-03-12 DIAGNOSIS — Z79.899 HIGH RISK MEDICATION USE: ICD-10-CM

## 2021-03-12 DIAGNOSIS — I27.20 PULMONARY HYPERTENSION (H): ICD-10-CM

## 2021-03-12 DIAGNOSIS — G89.29 CHRONIC INTRACTABLE PAIN: ICD-10-CM

## 2021-03-12 DIAGNOSIS — E83.110 HEREDITARY HEMOCHROMATOSIS (H): ICD-10-CM

## 2021-03-12 LAB
BASOPHILS # BLD AUTO: 0 THOU/UL (ref 0–0.2)
BASOPHILS NFR BLD AUTO: 0 % (ref 0–2)
EOSINOPHIL # BLD AUTO: 0.1 THOU/UL (ref 0–0.4)
EOSINOPHIL NFR BLD AUTO: 2 % (ref 0–6)
ERYTHROCYTE [DISTWIDTH] IN BLOOD BY AUTOMATED COUNT: 13.8 % (ref 11–14.5)
FERRITIN SERPL-MCNC: 29 NG/ML (ref 10–130)
HCT VFR BLD AUTO: 47.4 % (ref 35–47)
HGB BLD-MCNC: 16.3 G/DL (ref 12–16)
IMM GRANULOCYTES # BLD: 0 THOU/UL
IMM GRANULOCYTES NFR BLD: 1 %
LITHIUM SERPL-SCNC: <0.2 MMOL/L (ref 0.6–1.2)
LYMPHOCYTES # BLD AUTO: 1.6 THOU/UL (ref 0.8–4.4)
LYMPHOCYTES NFR BLD AUTO: 22 % (ref 20–40)
MCH RBC QN AUTO: 31.8 PG (ref 27–34)
MCHC RBC AUTO-ENTMCNC: 34.4 G/DL (ref 32–36)
MCV RBC AUTO: 93 FL (ref 80–100)
MONOCYTES # BLD AUTO: 0.6 THOU/UL (ref 0–0.9)
MONOCYTES NFR BLD AUTO: 8 % (ref 2–10)
NEUTROPHILS # BLD AUTO: 4.8 THOU/UL (ref 2–7.7)
NEUTROPHILS NFR BLD AUTO: 68 % (ref 50–70)
PLATELET # BLD AUTO: 249 THOU/UL (ref 140–440)
PMV BLD AUTO: 8 FL (ref 7–10)
RBC # BLD AUTO: 5.12 MILL/UL (ref 3.8–5.4)
TSH SERPL DL<=0.005 MIU/L-ACNC: 1.93 UIU/ML (ref 0.3–5)
WBC: 7.1 THOU/UL (ref 4–11)

## 2021-03-14 ENCOUNTER — HEALTH MAINTENANCE LETTER (OUTPATIENT)
Age: 66
End: 2021-03-14

## 2021-03-15 LAB
25(OH)D3 SERPL-MCNC: 67.8 NG/ML (ref 30–80)
25(OH)D3 SERPL-MCNC: 67.8 NG/ML (ref 30–80)
T4 FREE SERPL-MCNC: 1.1 NG/DL (ref 0.7–1.8)

## 2021-03-16 ENCOUNTER — COMMUNICATION - HEALTHEAST (OUTPATIENT)
Dept: PALLIATIVE MEDICINE | Facility: OTHER | Age: 66
End: 2021-03-16

## 2021-03-16 NOTE — PROGRESS NOTES
Patient returned call.    Subjective measures:  Patient reports she had has some things happen at home has not been using the device.   She has lowered the moisture on her device.  She is trying to find a place were she can place her machine when she uses it at night.  She has not left the device plugging in long enough to accept the pressure changes.  She will try and start using in the next few days and will leave the device plugged in.     Assessment: Pt not meeting objective benchmarks for compliance       Action plan:2 week STM recheck appt scheduled      Total time spent counseling, coaching  and reviewing PAP therapy data with patient  10 minutes     [id number] (this call)

## 2021-03-17 ENCOUNTER — COMMUNICATION - HEALTHEAST (OUTPATIENT)
Dept: PALLIATIVE MEDICINE | Facility: OTHER | Age: 66
End: 2021-03-17

## 2021-03-18 ASSESSMENT — ANXIETY QUESTIONNAIRES
7. FEELING AFRAID AS IF SOMETHING AWFUL MIGHT HAPPEN: SEVERAL DAYS
5. BEING SO RESTLESS THAT IT IS HARD TO SIT STILL: SEVERAL DAYS
6. BECOMING EASILY ANNOYED OR IRRITABLE: MORE THAN HALF THE DAYS
GAD7 TOTAL SCORE: 10
2. NOT BEING ABLE TO STOP OR CONTROL WORRYING: SEVERAL DAYS
1. FEELING NERVOUS, ANXIOUS, OR ON EDGE: MORE THAN HALF THE DAYS
4. TROUBLE RELAXING: MORE THAN HALF THE DAYS
3. WORRYING TOO MUCH ABOUT DIFFERENT THINGS: SEVERAL DAYS

## 2021-03-18 ASSESSMENT — PATIENT HEALTH QUESTIONNAIRE - PHQ9: SUM OF ALL RESPONSES TO PHQ QUESTIONS 1-9: 13

## 2021-03-22 ENCOUNTER — COMMUNICATION - HEALTHEAST (OUTPATIENT)
Dept: NURSING | Facility: CLINIC | Age: 66
End: 2021-03-22

## 2021-03-23 ENCOUNTER — COMMUNICATION - HEALTHEAST (OUTPATIENT)
Dept: PHARMACY | Facility: CLINIC | Age: 66
End: 2021-03-23

## 2021-03-25 ENCOUNTER — VIRTUAL VISIT (OUTPATIENT)
Dept: PSYCHOLOGY | Facility: CLINIC | Age: 66
End: 2021-03-25
Payer: MEDICARE

## 2021-03-25 DIAGNOSIS — F41.1 GAD (GENERALIZED ANXIETY DISORDER): ICD-10-CM

## 2021-03-25 DIAGNOSIS — F33.1 MAJOR DEPRESSIVE DISORDER, RECURRENT EPISODE, MODERATE WITH ANXIOUS DISTRESS (H): Primary | ICD-10-CM

## 2021-03-25 PROCEDURE — 90834 PSYTX W PT 45 MINUTES: CPT | Mod: 95 | Performed by: SOCIAL WORKER

## 2021-03-25 ASSESSMENT — PATIENT HEALTH QUESTIONNAIRE - PHQ9
SUM OF ALL RESPONSES TO PHQ QUESTIONS 1-9: 17
10. IF YOU CHECKED OFF ANY PROBLEMS, HOW DIFFICULT HAVE THESE PROBLEMS MADE IT FOR YOU TO DO YOUR WORK, TAKE CARE OF THINGS AT HOME, OR GET ALONG WITH OTHER PEOPLE: VERY DIFFICULT
SUM OF ALL RESPONSES TO PHQ QUESTIONS 1-9: 17
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
## 2021-03-26 ENCOUNTER — DOCUMENTATION ONLY (OUTPATIENT)
Dept: SLEEP MEDICINE | Facility: CLINIC | Age: 66
End: 2021-03-26

## 2021-03-26 ASSESSMENT — PATIENT HEALTH QUESTIONNAIRE - PHQ9: SUM OF ALL RESPONSES TO PHQ QUESTIONS 1-9: 17

## 2021-03-26 NOTE — PROGRESS NOTES
Patient was a re-entry into [facility].    Pressure change has now been applied to her device, however she still has not used the device.      Recheck again in 14 days

## 2021-03-30 DIAGNOSIS — E78.5 DYSLIPIDEMIA: ICD-10-CM

## 2021-03-31 RX ORDER — LOSARTAN POTASSIUM 25 MG/1
25 TABLET ORAL DAILY
Qty: 90 TABLET | Refills: 0 | Status: SHIPPED | OUTPATIENT
Start: 2021-03-31 | End: 2021-06-25

## 2021-03-31 NOTE — TELEPHONE ENCOUNTER
losartan (COZAAR) 25 MG tablet  Take 1 tablet (25 mg) by mouth daily      Last Written Prescription Date:  3/12/20  Last Fill Quantity: 90,   # refills: 3  Last Office Visit : 7/22/20  Future Office visit:  4/27/21    Routing refill request to provider for review/approval because:  BP > 140/90 on   02/25/21 (!) 151/97     90 day to pharmacy. Clinic notified.

## 2021-03-31 NOTE — TELEPHONE ENCOUNTER
[facility] Call Center    Phone Message    May a detailed message be left on voicemail: yes     Reason for Call: Medication Refill Request    Has the patient contacted the pharmacy for the refill? Yes   Name of medication being requested: Losartan  Provider who prescribed the medication: Grace   Pharmacy: [facility] [place]  Date medication is needed: Pt has a 5 day supply left        Action Taken: Message routed to:  [facility]): med refills    Travel Screening: Not Applicable

## 2021-04-05 ENCOUNTER — VIRTUAL VISIT (OUTPATIENT)
Dept: PSYCHOLOGY | Facility: CLINIC | Age: 66
End: 2021-04-05
Payer: MEDICARE

## 2021-04-05 DIAGNOSIS — F41.1 GAD (GENERALIZED ANXIETY DISORDER): ICD-10-CM

## 2021-04-05 DIAGNOSIS — F33.1 MAJOR DEPRESSIVE DISORDER, RECURRENT EPISODE, MODERATE WITH ANXIOUS DISTRESS (H): Primary | ICD-10-CM

## 2021-04-05 PROCEDURE — 90834 PSYTX W PT 45 MINUTES: CPT | Mod: 95 | Performed by: SOCIAL WORKER

## 2021-04-05 NOTE — PROGRESS NOTES
Progress Note    Patient Name: Randi Cleary  Date: 4/5/21         Service Type: Individual      Session Start Time: 11:32 AM  Session End Time: 12:21 PM     Session Length: 49 minutes     Session #: 35    Attendees: Client attended alone    Service Modality:  Video Visit:    Telemedicine Visit: The patient's condition can be safely assessed and treated via synchronous audio and visual telemedicine encounter.      Reason for Telemedicine Visit: Services only offered telehealth    Originating Site (Patient Location): Patient's home    Distant Site (Provider Location): Provider Remote Setting    Consent:  The patient/guardian has verbally consented to: the potential risks and benefits of telemedicine (video visit) versus in person care; bill my insurance or make self-payment for services provided; and responsibility for payment of non-covered services.     Mode of Communication:  Video Conference via DVDPlay    As the provider I attest to compliance with applicable laws and regulations related to telemedicine.      Treatment Plan Last Reviewed: 3/8/21  PHQ-9 / CHAVO-7 :   PHQ 3/18/2021 3/18/2021 3/25/2021   PHQ-9 Total Score 13 13 17   Q9: Thoughts of better off dead/self-harm past 2 weeks Not at all Not at all Several days   F/U: Thoughts of suicide or self-harm - - Yes   F/U: Self harm-plan - - No   F/U: Self-harm action - - No   F/U: Safety concerns - - Yes     CHAVO-7 SCORE 3/18/2021 3/18/2021 3/18/2021   Total Score 10 (moderate anxiety) 10 (moderate anxiety) 10 (moderate anxiety)   Total Score - 10 10         DATA  Interactive Complexity: No  Crisis: No       Progress Since Last Session (Related to Symptoms / Goals / Homework):   Symptoms: patient has been feeling frustrated and overwhelmed    Homework: Achieved / completed to satisfaction  Utilize safety plan as needed       Episode of Care Goals: Satisfactory progress - ACTION (Actively working towards change);  Intervened by reinforcing change plan / affirming steps taken     Current / Ongoing Stressors and Concerns:   Patient is currently socially isolated. She has a conflictual relationship with her .  She is getting minimal physical activity.  She had recent eye surgery     Treatment Objective(s) Addressed in This Session:   Patient will increase frequency of engaging her in ADLs.  Patient will track and record at least 5 pleasant exchanges with . Patient will be able to identify at least 5 positive traits about her .  Patient will reduce level of depressive and anxious features as evidenced by reduction in score on her CHAVO-7 and PHQ-9 (scores of 15 and 16 at first measurment, respectively).     Intervention:   CBT: Person-Centered Therapy: Patient had shared some of her frustrations and challenging experiences from the past week. Talked about how to get herself re-centered.    ASSESSMENT: Current Emotional / Mental Status (status of significant symptoms):   Risk status (Self / Other harm or suicidal ideation)   Patient denies current fears or concerns for personal safety.   Patient denies current or recent suicidal ideation or behaviors.   Patient denies current or recent homicidal ideation or behaviors.   Patient denies current or recent self injurious behavior or ideation.   Patient denies other safety concerns.   Patient reports there has been no change in risk factors since their last session.     Patient reports there has been no change in protective factors since their last session.     A safety and risk management plan has been developed including: Patient consented to co-developed safety plan.  Safety and risk management plan was completed.  Patient agreed to use safety plan should any safety concerns arise.  A copy was given to the patient. This was done in a previous session.     Appearance:   Appropriate    Eye Contact:   Fair    Psychomotor Behavior: Normal    Attitude:   Cooperative  "   Orientation:   All   Speech    Rate / Production: Normal/ Responsive    Volume:  Normal    Mood:    Anxious  yet hopeful   Affect:    Appropriate  Bright    Thought Content:  Clear    Thought Form:  Coherent    Insight:    Fair      Medication Review:   No changes to current psychiatric medication(s)     Medication Compliance:   Yes     Changes in Health Issues:   None reported     Chemical Use Review:   Substance Use: Chemical use reviewed, no active concerns identified      Tobacco Use: No current tobacco use.      Diagnosis:  1. Major depressive disorder, recurrent episode, moderate with anxious distress (H)    2. CHAVO (generalized anxiety disorder)      Collateral Reports Completed:   Not Applicable    PLAN: (Patient Tasks / Therapist Tasks / Other)  Engage in self-care          CRUZ HOLA BAKERBI JO    2021                                                         ______________________________________________________________________    Treatment Plan    Patient's Name: Randi Cleary  YOB: 1955    Date: 3/8/21    DSM5 Diagnoses: 296.32 (F33.1) Major Depressive Disorder, Recurrent Episode, Moderate With anxious distress  Psychosocial / Contextual Factors: Patient's entire family of origin has , she now has a sister-in-law and  as support.  Relationship with  is conflictual.  Patient is reporting increase in physical symptoms due to COVID-19.  Patient is off of pain medications.  WHODAS: 42    Referral / Collaboration:  Referral to another professional/service is not indicated at this time.     Anticipated number of session or this episode of care: 12-15      MeasurableTreatment Goal(s) related to diagnosis / functional impairment(s)  Goal 1: Patient will \"jumpstart, getting going with the things I need to be doing around the house as far as picking up, doing things, trying to do something every day.  Also to lessen the animosity between me and my .\"    I will " know I've met my goal when my shoulders are fixed and I can see.      Objective #A (Patient Action)    Patient will increase frequency of engaging her in ADLs.  Status: Continued - Date(s): 3/8/21    Intervention(s)  Therapist will engage patient in CBT, specifically behavioral activation.    Objective #B  Patient will track and record at least 5 pleasant exchanges with . Patient will be able to identify at least 5 positive traits about her  and how he relates to her.  Status: Continued - Date(s): 3/8/21    Intervention(s)  Therapist will teach assertiveness skills and assign homework related to relationship interactions.    Objective #C  Patient will reduce level of depressive and anxious features as evidenced by reduction in score on her CHAVO-7 and PHQ-9 (scores of 15 and 16 at first measurment, respectively).  Status: Continued - Date(s): 3/8/21    Intervention(s)  Therapist will engage patient in person-centered therapy and CBT.    Patient has reviewed and agreed to the above plan.      MICAELA SLADE  March 8, 2021

## 2021-04-06 ENCOUNTER — RECORDS - HEALTHEAST (OUTPATIENT)
Dept: ADMINISTRATIVE | Facility: OTHER | Age: 66
End: 2021-04-06

## 2021-04-07 ENCOUNTER — HOSPITAL ENCOUNTER (OUTPATIENT)
Dept: PALLIATIVE MEDICINE | Facility: OTHER | Age: 66
Discharge: HOME | End: 2021-04-07
Attending: ANESTHESIOLOGY

## 2021-04-07 DIAGNOSIS — G25.81 RESTLESS LEGS SYNDROME (RLS): ICD-10-CM

## 2021-04-13 ENCOUNTER — COMMUNICATION - HEALTHEAST (OUTPATIENT)
Dept: NURSING | Facility: CLINIC | Age: 66
End: 2021-04-13

## 2021-04-14 ENCOUNTER — TELEPHONE (OUTPATIENT)
Dept: CARDIOLOGY | Facility: CLINIC | Age: 66
End: 2021-04-14

## 2021-04-14 NOTE — TELEPHONE ENCOUNTER
Pt needs to reschedule 4/27 appt with Dr. Edwadrs (add labs).    Can be rescheduled with Arlyn Stern PA-C as well.

## 2021-04-15 ENCOUNTER — VIRTUAL VISIT (OUTPATIENT)
Dept: PSYCHOLOGY | Facility: CLINIC | Age: 66
End: 2021-04-15
Payer: MEDICARE

## 2021-04-15 DIAGNOSIS — F33.1 MAJOR DEPRESSIVE DISORDER, RECURRENT EPISODE, MODERATE WITH ANXIOUS DISTRESS (H): Primary | ICD-10-CM

## 2021-04-15 DIAGNOSIS — F41.1 GAD (GENERALIZED ANXIETY DISORDER): ICD-10-CM

## 2021-04-15 PROCEDURE — 90834 PSYTX W PT 45 MINUTES: CPT | Mod: 95 | Performed by: SOCIAL WORKER

## 2021-04-15 ASSESSMENT — ANXIETY QUESTIONNAIRES
7. FEELING AFRAID AS IF SOMETHING AWFUL MIGHT HAPPEN: SEVERAL DAYS
GAD7 TOTAL SCORE: 9
2. NOT BEING ABLE TO STOP OR CONTROL WORRYING: SEVERAL DAYS
3. WORRYING TOO MUCH ABOUT DIFFERENT THINGS: SEVERAL DAYS
GAD7 TOTAL SCORE: 9
4. TROUBLE RELAXING: MORE THAN HALF THE DAYS
GAD7 TOTAL SCORE: 9
6. BECOMING EASILY ANNOYED OR IRRITABLE: MORE THAN HALF THE DAYS
7. FEELING AFRAID AS IF SOMETHING AWFUL MIGHT HAPPEN: SEVERAL DAYS
5. BEING SO RESTLESS THAT IT IS HARD TO SIT STILL: SEVERAL DAYS
1. FEELING NERVOUS, ANXIOUS, OR ON EDGE: SEVERAL DAYS

## 2021-04-15 ASSESSMENT — PATIENT HEALTH QUESTIONNAIRE - PHQ9
SUM OF ALL RESPONSES TO PHQ QUESTIONS 1-9: 11
SUM OF ALL RESPONSES TO PHQ QUESTIONS 1-9: 11
10. IF YOU CHECKED OFF ANY PROBLEMS, HOW DIFFICULT HAVE THESE PROBLEMS MADE IT FOR YOU TO DO YOUR WORK, TAKE CARE OF THINGS AT HOME, OR GET ALONG WITH OTHER PEOPLE: VERY DIFFICULT

## 2021-04-15 NOTE — PROGRESS NOTES
Progress Note    Patient Name: Randi Cleary  Date: 4/15/21         Service Type: Individual      Session Start Time: 11:32 AM  Session End Time: 12:18 PM     Session Length: 46 minutes     Session #: 36    Attendees: Client attended alone    Service Modality:  Video Visit:    Telemedicine Visit: The patient's condition can be safely assessed and treated via synchronous audio and visual telemedicine encounter.      Reason for Telemedicine Visit: Services only offered telehealth    Originating Site (Patient Location): Patient's home    Distant Site (Provider Location): Provider Remote Setting    Consent:  The patient/guardian has verbally consented to: the potential risks and benefits of telemedicine (video visit) versus in person care; bill my insurance or make self-payment for services provided; and responsibility for payment of non-covered services.     Mode of Communication:  Video Conference via Figure 1    As the provider I attest to compliance with applicable laws and regulations related to telemedicine.      Treatment Plan Last Reviewed: 3/8/21  PHQ-9 / CHAVO-7 :   PHQ 3/18/2021 3/25/2021 4/15/2021   PHQ-9 Total Score 13 17 11   Q9: Thoughts of better off dead/self-harm past 2 weeks Not at all Several days Several days   F/U: Thoughts of suicide or self-harm - Yes No   F/U: Self harm-plan - No -   F/U: Self-harm action - No -   F/U: Safety concerns - Yes No     CHAVO-7 SCORE 3/18/2021 3/18/2021 4/15/2021   Total Score 10 (moderate anxiety) 10 (moderate anxiety) 9 (mild anxiety)   Total Score 10 10 9         DATA  Interactive Complexity: No  Crisis: No       Progress Since Last Session (Related to Symptoms / Goals / Homework):   Symptoms: Improving better than previous week    Homework: Achieved / completed to satisfaction  Engage in self-care -went to an [facility]       Episode of Care Goals: Satisfactory progress - ACTION (Actively working towards change); Intervened  "by reinforcing change plan / affirming steps taken     Current / Ongoing Stressors and Concerns:   Patient is currently socially isolated. She has a conflictual relationship with her .  She is getting minimal physical activity.  She had recent eye surgery     Treatment Objective(s) Addressed in This Session:   Patient will increase frequency of engaging her in ADLs.  Patient will track and record at least 5 pleasant exchanges with . Patient will be able to identify at least 5 positive traits about her .  Patient will reduce level of depressive and anxious features as evidenced by reduction in score on her CHAVO-7 and PHQ-9 (scores of 15 and 16 at first measurment, respectively).     Intervention:   CBT: Person-Centered Therapy: Patient has frustration that she's still fighting all of her problems, but still has hope that they are getting better. She reports she is letting what people say or think affect her too much. Patient has had some frustrations lately with the medical system. Discussed some of the positives in her life, including the success she's having in PT.    ASSESSMENT: Current Emotional / Mental Status (status of significant symptoms):   Risk status (Self / Other harm or suicidal ideation)   Patient denies current fears or concerns for personal safety.   Patient reports the following current or recent suicidal ideation or behaviors: \"I'm sick and tired of being sick and tired,\" but has no plans to harm herself, just not wanting to deal with her problems anymore.   Patient denies current or recent homicidal ideation or behaviors.   Patient denies current or recent self injurious behavior or ideation.   Patient denies other safety concerns.   Patient reports there has been no change in risk factors since their last session.     Patient reports there has been no change in protective factors since their last session.     A safety and risk management plan has been developed including: " Patient consented to co-developed safety plan.  Safety and risk management plan was completed.  Patient agreed to use safety plan should any safety concerns arise.  A copy was given to the patient. This was done in a previous session.     Appearance:   Appropriate    Eye Contact:   Fair    Psychomotor Behavior: Normal    Attitude:   Cooperative    Orientation:   All   Speech    Rate / Production: Normal/ Responsive    Volume:  Normal    Mood:    Anxious  yet hopeful   Affect:    Appropriate  Bright    Thought Content:  Clear    Thought Form:  Coherent    Insight:    Fair      Medication Review:   No changes to current psychiatric medication(s)     Medication Compliance:   Yes     Changes in Health Issues:   None reported     Chemical Use Review:   Substance Use: Chemical use reviewed, no active concerns identified      Tobacco Use: No current tobacco use.      Diagnosis:  1. Major depressive disorder, recurrent episode, moderate with anxious distress (H)    2. CHAVO (generalized anxiety disorder)      Collateral Reports Completed:   Not Applicable    PLAN: (Patient Tasks / Therapist Tasks / Other)  Engage in self-care          MICAELA SLADE    April 15, 2021                                                         ______________________________________________________________________    Treatment Plan    Patient's Name: Randi Cleary  YOB: 1955    Date: 3/8/21    DSM5 Diagnoses: 296.32 (F33.1) Major Depressive Disorder, Recurrent Episode, Moderate With anxious distress  Psychosocial / Contextual Factors: Patient's entire family of origin has , she now has a sister-in-law and  as support.  Relationship with  is conflictual.  Patient is reporting increase in physical symptoms due to COVID-19.  Patient is off of pain medications.  WHODAS: 42    Referral / Collaboration:  Referral to another professional/service is not indicated at this time.     Anticipated number of session or  "this episode of care: 12-15      MeasurableTreatment Goal(s) related to diagnosis / functional impairment(s)  Goal 1: Patient will \"jumpstart, getting going with the things I need to be doing around the house as far as picking up, doing things, trying to do something every day.  Also to lessen the animosity between me and my .\"    I will know I've met my goal when my shoulders are fixed and I can see.      Objective #A (Patient Action)    Patient will increase frequency of engaging her in ADLs.  Status: Continued - Date(s): 3/8/21    Intervention(s)  Therapist will engage patient in CBT, specifically behavioral activation.    Objective #B  Patient will track and record at least 5 pleasant exchanges with . Patient will be able to identify at least 5 positive traits about her  and how he relates to her.  Status: Continued - Date(s): 3/8/21    Intervention(s)  Therapist will teach assertiveness skills and assign homework related to relationship interactions.    Objective #C  Patient will reduce level of depressive and anxious features as evidenced by reduction in score on her CHAVO-7 and PHQ-9 (scores of 15 and 16 at first measurment, respectively).  Status: Continued - Date(s): 3/8/21    Intervention(s)  Therapist will engage patient in person-centered therapy and CBT.    Patient has reviewed and agreed to the above plan.      MICAELA SLADE  March 8, 2021  "

## 2021-04-16 ASSESSMENT — ANXIETY QUESTIONNAIRES: GAD7 TOTAL SCORE: 9

## 2021-04-16 ASSESSMENT — PATIENT HEALTH QUESTIONNAIRE - PHQ9: SUM OF ALL RESPONSES TO PHQ QUESTIONS 1-9: 11

## 2021-04-18 ENCOUNTER — RECORDS - HEALTHEAST (OUTPATIENT)
Dept: ADMINISTRATIVE | Facility: OTHER | Age: 66
End: 2021-04-18

## 2021-04-19 ENCOUNTER — COMMUNICATION - HEALTHEAST (OUTPATIENT)
Dept: NURSING | Facility: CLINIC | Age: 66
End: 2021-04-19

## 2021-04-20 ENCOUNTER — VIRTUAL VISIT (OUTPATIENT)
Dept: PSYCHOLOGY | Facility: CLINIC | Age: 66
End: 2021-04-20
Payer: MEDICARE

## 2021-04-20 DIAGNOSIS — F41.1 GAD (GENERALIZED ANXIETY DISORDER): ICD-10-CM

## 2021-04-20 DIAGNOSIS — F33.1 MAJOR DEPRESSIVE DISORDER, RECURRENT EPISODE, MODERATE WITH ANXIOUS DISTRESS (H): Primary | ICD-10-CM

## 2021-04-20 PROCEDURE — 90834 PSYTX W PT 45 MINUTES: CPT | Mod: 95 | Performed by: SOCIAL WORKER

## 2021-04-20 NOTE — PROGRESS NOTES
Progress Note    Patient Name: Randi Cleary  Date: 4/20/21         Service Type: Individual      Session Start Time: 4:15 PM  Session End Time: 4:59 PM     Session Length: 44 minutes     Session #: 37    Attendees: Client attended alone    Service Modality:  Video Visit:    Telemedicine Visit: The patient's condition can be safely assessed and treated via synchronous audio and visual telemedicine encounter.      Reason for Telemedicine Visit: Services only offered telehealth    Originating Site (Patient Location): Patient's home    Distant Site (Provider Location): Provider Remote Setting    Consent:  The patient/guardian has verbally consented to: the potential risks and benefits of telemedicine (video visit) versus in person care; bill my insurance or make self-payment for services provided; and responsibility for payment of non-covered services.     Mode of Communication:  Video Conference via [employer]    As the provider I attest to compliance with applicable laws and regulations related to telemedicine.      Treatment Plan Last Reviewed: 3/8/21  PHQ-9 / CHAVO-7 :   PHQ 3/18/2021 3/25/2021 4/15/2021   PHQ-9 Total Score 13 17 11   Q9: Thoughts of better off dead/self-harm past 2 weeks Not at all Several days Several days   F/U: Thoughts of suicide or self-harm - Yes No   F/U: Self harm-plan - No -   F/U: Self-harm action - No -   F/U: Safety concerns - Yes No     CHAVO-7 SCORE 3/18/2021 3/18/2021 4/15/2021   Total Score 10 (moderate anxiety) 10 (moderate anxiety) 9 (mild anxiety)   Total Score 10 10 9         DATA  Interactive Complexity: No  Crisis: No       Progress Since Last Session (Related to Symptoms / Goals / Homework):   Symptoms: Improving more energy and follow through    Homework: Achieved / completed to satisfaction  Engage in self-care -done       Episode of Care Goals: Satisfactory progress - ACTION (Actively working towards change); Intervened by  reinforcing change plan / affirming steps taken     Current / Ongoing Stressors and Concerns:   Patient is currently socially isolated. She has a conflictual relationship with her .  She is getting minimal physical activity.  She had recent eye surgery     Treatment Objective(s) Addressed in This Session:   Patient will increase frequency of engaging her in ADLs.  Patient will track and record at least 5 pleasant exchanges with . Patient will be able to identify at least 5 positive traits about her .  Patient will reduce level of depressive and anxious features as evidenced by reduction in score on her CHAVO-7 and PHQ-9 (scores of 15 and 16 at first measurment, respectively).     Intervention:   CBT: Person-Centered Therapy: Patient's sister in law  recently, processed this.  Also processed the trial and reactions to this. Patient discussed her health and steps to take care of her health. Talked about increased motivation and follow through, identified that keeping busy is helping her.    ASSESSMENT: Current Emotional / Mental Status (status of significant symptoms):   Risk status (Self / Other harm or suicidal ideation)   Patient denies current fears or concerns for personal safety.   Patient denies current or recent suicidal ideation or behaviors.   Patient denies current or recent homicidal ideation or behaviors.   Patient denies current or recent self injurious behavior or ideation.   Patient denies other safety concerns.   Patient reports there has been no change in risk factors since their last session.     Patient reports there has been no change in protective factors since their last session.     A safety and risk management plan has been developed including: Patient consented to co-developed safety plan.  Safety and risk management plan was completed.  Patient agreed to use safety plan should any safety concerns arise.  A copy was given to the patient. This was done in a previous  "session.     Appearance:   Appropriate    Eye Contact:   Fair    Psychomotor Behavior: Normal    Attitude:   Cooperative    Orientation:   All   Speech    Rate / Production: Normal/ Responsive    Volume:  Normal    Mood:    hopeful   Affect:    Appropriate  Bright    Thought Content:  Clear    Thought Form:  Coherent    Insight:    Fair      Medication Review:   No changes to current psychiatric medication(s)     Medication Compliance:   Yes     Changes in Health Issues:   None reported     Chemical Use Review:   Substance Use: Chemical use reviewed, no active concerns identified      Tobacco Use: No current tobacco use.      Diagnosis:  1. Major depressive disorder, recurrent episode, moderate with anxious distress (H)    2. CHAVO (generalized anxiety disorder)      Collateral Reports Completed:   Not Applicable    PLAN: (Patient Tasks / Therapist Tasks / Other)  Keep busy    Next session trying to come across how I want to come across, work on more emotional regulation        MICAELA SLADE    2021                                                         ______________________________________________________________________    Treatment Plan    Patient's Name: Randi Cleary  YOB: 1955    Date: 3/8/21    DSM5 Diagnoses: 296.32 (F33.1) Major Depressive Disorder, Recurrent Episode, Moderate With anxious distress  Psychosocial / Contextual Factors: Patient's entire family of origin has , she now has a sister-in-law and  as support.  Relationship with  is conflictual.  Patient is reporting increase in physical symptoms due to COVID-19.  Patient is off of pain medications.  WHODAS: 42    Referral / Collaboration:  Referral to another professional/service is not indicated at this time.     Anticipated number of session or this episode of care: 12-15      MeasurableTreatment Goal(s) related to diagnosis / functional impairment(s)  Goal 1: Patient will \"jumpstart, getting " "going with the things I need to be doing around the house as far as picking up, doing things, trying to do something every day.  Also to lessen the animosity between me and my .\"    I will know I've met my goal when my shoulders are fixed and I can see.      Objective #A (Patient Action)    Patient will increase frequency of engaging her in ADLs.  Status: Continued - Date(s): 3/8/21    Intervention(s)  Therapist will engage patient in CBT, specifically behavioral activation.    Objective #B  Patient will track and record at least 5 pleasant exchanges with . Patient will be able to identify at least 5 positive traits about her  and how he relates to her.  Status: Continued - Date(s): 3/8/21    Intervention(s)  Therapist will teach assertiveness skills and assign homework related to relationship interactions.    Objective #C  Patient will reduce level of depressive and anxious features as evidenced by reduction in score on her CHAVO-7 and PHQ-9 (scores of 15 and 16 at first measurment, respectively).  Status: Continued - Date(s): 3/8/21    Intervention(s)  Therapist will engage patient in person-centered therapy and CBT.    Patient has reviewed and agreed to the above plan.      MICAELA SLADE  March 8, 2021  "

## 2021-04-22 ENCOUNTER — AMBULATORY - HEALTHEAST (OUTPATIENT)
Dept: LAB | Facility: CLINIC | Age: 66
End: 2021-04-22

## 2021-04-22 DIAGNOSIS — I27.20 PULMONARY HYPERTENSION (H): ICD-10-CM

## 2021-04-22 DIAGNOSIS — R06.02 SHORTNESS OF BREATH: ICD-10-CM

## 2021-04-22 DIAGNOSIS — E83.119 HEMOCHROMATOSIS, UNSPECIFIED HEMOCHROMATOSIS TYPE: ICD-10-CM

## 2021-04-28 ENCOUNTER — VIRTUAL VISIT (OUTPATIENT)
Dept: PSYCHOLOGY | Facility: CLINIC | Age: 66
End: 2021-04-28
Payer: MEDICARE

## 2021-04-28 DIAGNOSIS — F33.1 MAJOR DEPRESSIVE DISORDER, RECURRENT EPISODE, MODERATE WITH ANXIOUS DISTRESS (H): Primary | ICD-10-CM

## 2021-04-28 DIAGNOSIS — F41.1 GAD (GENERALIZED ANXIETY DISORDER): ICD-10-CM

## 2021-04-28 PROCEDURE — 90834 PSYTX W PT 45 MINUTES: CPT | Mod: 95 | Performed by: SOCIAL WORKER

## 2021-04-28 ASSESSMENT — PATIENT HEALTH QUESTIONNAIRE - PHQ9
10. IF YOU CHECKED OFF ANY PROBLEMS, HOW DIFFICULT HAVE THESE PROBLEMS MADE IT FOR YOU TO DO YOUR WORK, TAKE CARE OF THINGS AT HOME, OR GET ALONG WITH OTHER PEOPLE: VERY DIFFICULT
SUM OF ALL RESPONSES TO PHQ QUESTIONS 1-9: 15
SUM OF ALL RESPONSES TO PHQ QUESTIONS 1-9: 15

## 2021-04-28 NOTE — PROGRESS NOTES
Progress Note    Patient Name: Randi Cleary  Date: 4/28/21         Service Type: Individual      Session Start Time: 4:10 PM  Session End Time: 4:50 PM     Session Length: 40 minutes     Session #: 38    Attendees: Client attended alone    Service Modality:  Video Visit:    Telemedicine Visit: The patient's condition can be safely assessed and treated via synchronous audio and visual telemedicine encounter.      Reason for Telemedicine Visit: Services only offered telehealth    Originating Site (Patient Location): Patient's home    Distant Site (Provider Location): Provider Remote Setting    Consent:  The patient/guardian has verbally consented to: the potential risks and benefits of telemedicine (video visit) versus in person care; bill my insurance or make self-payment for services provided; and responsibility for payment of non-covered services.     Mode of Communication:  Video Conference via [employer], started via phone due to technical problems    As the provider I attest to compliance with applicable laws and regulations related to telemedicine.      Treatment Plan Last Reviewed: 3/8/21  PHQ-9 / CHAVO-7 :   PHQ 3/25/2021 4/15/2021 4/28/2021   PHQ-9 Total Score 17 11 15   Q9: Thoughts of better off dead/self-harm past 2 weeks Several days Several days Not at all   F/U: Thoughts of suicide or self-harm Yes No -   F/U: Self harm-plan No - -   F/U: Self-harm action No - -   F/U: Safety concerns Yes No -     CHAVO-7 SCORE 3/18/2021 3/18/2021 4/15/2021   Total Score 10 (moderate anxiety) 10 (moderate anxiety) 9 (mild anxiety)   Total Score 10 10 9         DATA  Interactive Complexity: No  Crisis: No       Progress Since Last Session (Related to Symptoms / Goals / Homework):   Symptoms: having a difficult day, feeling more vulnerable    Homework: Achieved / completed to satisfaction  Keep busy       Episode of Care Goals: Satisfactory progress - ACTION (Actively working  towards change); Intervened by reinforcing change plan / affirming steps taken     Current / Ongoing Stressors and Concerns:   Patient is currently socially isolated. She has a conflictual relationship with her .  She is getting minimal physical activity.  She had recent eye surgery     Treatment Objective(s) Addressed in This Session:   Patient will increase frequency of engaging her in ADLs.  Patient will track and record at least 5 pleasant exchanges with . Patient will be able to identify at least 5 positive traits about her .  Patient will reduce level of depressive and anxious features as evidenced by reduction in score on her CHAVO-7 and PHQ-9 (scores of 15 and 16 at first measurment, respectively).     Intervention:   CBT: Person-Centered Therapy: Patient reports not sleeping well. She's also had a challenge recently with her new furniture which has been a stressor. She reports having bad anger today after not having this in awhile. Talked about some of her medical concerns, prioritizing them, and planning on who to reach out to for help with them.    ASSESSMENT: Current Emotional / Mental Status (status of significant symptoms):   Risk status (Self / Other harm or suicidal ideation)   Patient denies current fears or concerns for personal safety.   Patient denies current or recent suicidal ideation or behaviors.   Patient denies current or recent homicidal ideation or behaviors.   Patient denies current or recent self injurious behavior or ideation.   Patient denies other safety concerns.   Patient reports there has been no change in risk factors since their last session.     Patient reports there has been no change in protective factors since their last session.     A safety and risk management plan has been developed including: Patient consented to co-developed safety plan.  Safety and risk management plan was completed.  Patient agreed to use safety plan should any safety concerns  arise.  A copy was given to the patient. This was done in a previous session.     Appearance:   Appropriate    Eye Contact:   Fair    Psychomotor Behavior: Normal    Attitude:   Cooperative    Orientation:   All   Speech    Rate / Production: Normal/ Responsive    Volume:  Normal    Mood:    hopeful   Affect:    Appropriate  Bright    Thought Content:  Clear    Thought Form:  Coherent    Insight:    Fair      Medication Review:   No changes to current psychiatric medication(s)     Medication Compliance:   Yes     Changes in Health Issues:   None reported     Chemical Use Review:   Substance Use: Chemical use reviewed, no active concerns identified      Tobacco Use: No current tobacco use.      Diagnosis:  1. Major depressive disorder, recurrent episode, moderate with anxious distress (H)    2. CHAVO (generalized anxiety disorder)      Collateral Reports Completed:   Not Applicable    PLAN: (Patient Tasks / Therapist Tasks / Other)  Reach out to doctor    Next session trying to come across how I want to come across, work on more emotional regulation        MICAELA SLADE    2021                                                         ______________________________________________________________________    Treatment Plan    Patient's Name: Randi Cleary  YOB: 1955    Date: 3/8/21    DSM5 Diagnoses: 296.32 (F33.1) Major Depressive Disorder, Recurrent Episode, Moderate With anxious distress  Psychosocial / Contextual Factors: Patient's entire family of origin has , she now has a sister-in-law and  as support.  Relationship with  is conflictual.  Patient is reporting increase in physical symptoms due to COVID-19.  Patient is off of pain medications.  WHODAS: 42    Referral / Collaboration:  Referral to another professional/service is not indicated at this time.     Anticipated number of session or this episode of care: 12-15      MeasurableTreatment Goal(s) related to  "diagnosis / functional impairment(s)  Goal 1: Patient will \"jumpstart, getting going with the things I need to be doing around the house as far as picking up, doing things, trying to do something every day.  Also to lessen the animosity between me and my .\"    I will know I've met my goal when my shoulders are fixed and I can see.      Objective #A (Patient Action)    Patient will increase frequency of engaging her in ADLs.  Status: Continued - Date(s): 3/8/21    Intervention(s)  Therapist will engage patient in CBT, specifically behavioral activation.    Objective #B  Patient will track and record at least 5 pleasant exchanges with . Patient will be able to identify at least 5 positive traits about her  and how he relates to her.  Status: Continued - Date(s): 3/8/21    Intervention(s)  Therapist will teach assertiveness skills and assign homework related to relationship interactions.    Objective #C  Patient will reduce level of depressive and anxious features as evidenced by reduction in score on her CHAVO-7 and PHQ-9 (scores of 15 and 16 at first measurment, respectively).  Status: Continued - Date(s): 3/8/21    Intervention(s)  Therapist will engage patient in person-centered therapy and CBT.    Patient has reviewed and agreed to the above plan.      MICAELA SLADE  March 8, 2021  "

## 2021-04-29 ENCOUNTER — RECORDS - HEALTHEAST (OUTPATIENT)
Dept: ADMINISTRATIVE | Facility: OTHER | Age: 66
End: 2021-04-29

## 2021-04-29 ASSESSMENT — PATIENT HEALTH QUESTIONNAIRE - PHQ9: SUM OF ALL RESPONSES TO PHQ QUESTIONS 1-9: 15

## 2021-05-03 ENCOUNTER — RECORDS - HEALTHEAST (OUTPATIENT)
Dept: ADMINISTRATIVE | Facility: OTHER | Age: 66
End: 2021-05-03

## 2021-05-03 ENCOUNTER — VIRTUAL VISIT (OUTPATIENT)
Dept: PSYCHOLOGY | Facility: CLINIC | Age: 66
End: 2021-05-03
Payer: MEDICARE

## 2021-05-03 DIAGNOSIS — F33.1 MAJOR DEPRESSIVE DISORDER, RECURRENT EPISODE, MODERATE WITH ANXIOUS DISTRESS (H): Primary | ICD-10-CM

## 2021-05-03 DIAGNOSIS — F41.1 GAD (GENERALIZED ANXIETY DISORDER): ICD-10-CM

## 2021-05-03 PROCEDURE — 90834 PSYTX W PT 45 MINUTES: CPT | Mod: 95 | Performed by: SOCIAL WORKER

## 2021-05-03 NOTE — PROGRESS NOTES
Progress Note    Patient Name: Randi Cleary  Date: 5/3/21         Service Type: Individual      Session Start Time: 2:05 PM  Session End Time: 2:57 PM     Session Length: about 45 minutes, multiple 1-2 minute disconnections     Session #: 39    Attendees: Client attended alone    Service Modality:  Video Visit:    Telemedicine Visit: The patient's condition can be safely assessed and treated via synchronous audio and visual telemedicine encounter.      Reason for Telemedicine Visit: Services only offered telehealth    Originating Site (Patient Location): Patient's home    Distant Site (Provider Location): Provider Remote Setting    Consent:  The patient/guardian has verbally consented to: the potential risks and benefits of telemedicine (video visit) versus in person care; bill my insurance or make self-payment for services provided; and responsibility for payment of non-covered services.     Mode of Communication:  Video Conference via [employer], started via phone due to technical problems    As the provider I attest to compliance with applicable laws and regulations related to telemedicine.      Treatment Plan Last Reviewed: 3/8/21  PHQ-9 / CHAVO-7 :   PHQ 3/25/2021 4/15/2021 4/28/2021   PHQ-9 Total Score 17 11 15   Q9: Thoughts of better off dead/self-harm past 2 weeks Several days Several days Not at all   F/U: Thoughts of suicide or self-harm Yes No -   F/U: Self harm-plan No - -   F/U: Self-harm action No - -   F/U: Safety concerns Yes No -     CHAVO-7 SCORE 3/18/2021 3/18/2021 4/15/2021   Total Score 10 (moderate anxiety) 10 (moderate anxiety) 9 (mild anxiety)   Total Score 10 10 9         DATA  Interactive Complexity: No  Crisis: No       Progress Since Last Session (Related to Symptoms / Goals / Homework):   Symptoms: feeling overwhelmed lately    Homework: Achieved / completed to satisfaction  Reach out to doctor    Next session trying to come across how I want to  "come across, work on more emotional regulation       Episode of Care Goals: Satisfactory progress - ACTION (Actively working towards change); Intervened by reinforcing change plan / affirming steps taken     Current / Ongoing Stressors and Concerns:   Patient is currently socially isolated. She has a conflictual relationship with her .  She is getting minimal physical activity.  She had recent eye surgery     Treatment Objective(s) Addressed in This Session:   Patient will increase frequency of engaging her in ADLs.  Patient will track and record at least 5 pleasant exchanges with . Patient will be able to identify at least 5 positive traits about her .  Patient will reduce level of depressive and anxious features as evidenced by reduction in score on her CHAVO-7 and PHQ-9 (scores of 15 and 16 at first measurment, respectively).     Intervention:   CBT: Person-Centered Therapy: Patient is tired from having been swimming and having PT again today. She's finding there is more and more chaos keeps happening. Multiple times patient's internet went out and she was disconnected for 1-2 minutes at a time; she talked about how this was how everything was going in her life lately.  Discussed how she's managing and how she's finding hope. Also identified her multiple medical concerns and where to start with processing these.    ASSESSMENT: Current Emotional / Mental Status (status of significant symptoms):   Risk status (Self / Other harm or suicidal ideation)   Patient denies current fears or concerns for personal safety.   Patient reports the following current or recent suicidal ideation or behaviors: brief thoughts that \"it would be easier if I weren't here\" without any plan or intention, still has her saefty plan.   Patient denies current or recent homicidal ideation or behaviors.   Patient denies current or recent self injurious behavior or ideation.   Patient denies other safety concerns.   Patient " reports there has been no change in risk factors since their last session.     Patient reports there has been no change in protective factors since their last session.     A safety and risk management plan has been developed including: Patient consented to co-developed safety plan.  Safety and risk management plan was completed.  Patient agreed to use safety plan should any safety concerns arise.  A copy was given to the patient. This was done in a previous session.     Appearance:   Appropriate    Eye Contact:   Fair    Psychomotor Behavior: Normal    Attitude:   Cooperative    Orientation:   All   Speech    Rate / Production: Normal/ Responsive    Volume:  Normal    Mood:    frustrated, overwhelmed   Affect:    Worrisome    Thought Content:  Clear    Thought Form:  Coherent    Insight:    Fair      Medication Review:   No changes to current psychiatric medication(s)     Medication Compliance:   Yes     Changes in Health Issues:   None reported     Chemical Use Review:   Substance Use: Chemical use reviewed, no active concerns identified      Tobacco Use: No current tobacco use.      Diagnosis:  1. Major depressive disorder, recurrent episode, moderate with anxious distress (H)    2. CHAVO (generalized anxiety disorder)      Collateral Reports Completed:   Not Applicable    PLAN: (Patient Tasks / Therapist Tasks / Other)  Prioritize appointments  If continuing to have problems connecting, call [employer] support at [phone].    Next session trying to come across how I want to come across, work on more emotional regulation        MICAELA SLADE    May 3, 2021                                                         ______________________________________________________________________    Treatment Plan    Patient's Name: Randi Cleary  YOB: 1955    Date: 3/8/21    DSM5 Diagnoses: 296.32 (F33.1) Major Depressive Disorder, Recurrent Episode, Moderate With anxious distress  Psychosocial /  "Contextual Factors: Patient's entire family of origin has , she now has a sister-in-law and  as support.  Relationship with  is conflictual.  Patient is reporting increase in physical symptoms due to COVID-19.  Patient is off of pain medications.  WHODAS: 42    Referral / Collaboration:  Referral to another professional/service is not indicated at this time.     Anticipated number of session or this episode of care: 12-15      MeasurableTreatment Goal(s) related to diagnosis / functional impairment(s)  Goal 1: Patient will \"jumpstart, getting going with the things I need to be doing around the house as far as picking up, doing things, trying to do something every day.  Also to lessen the animosity between me and my .\"    I will know I've met my goal when my shoulders are fixed and I can see.      Objective #A (Patient Action)    Patient will increase frequency of engaging her in ADLs.  Status: Continued - Date(s): 3/8/21    Intervention(s)  Therapist will engage patient in CBT, specifically behavioral activation.    Objective #B  Patient will track and record at least 5 pleasant exchanges with . Patient will be able to identify at least 5 positive traits about her  and how he relates to her.  Status: Continued - Date(s): 3/8/21    Intervention(s)  Therapist will teach assertiveness skills and assign homework related to relationship interactions.    Objective #C  Patient will reduce level of depressive and anxious features as evidenced by reduction in score on her CHAVO-7 and PHQ-9 (scores of 15 and 16 at first measurment, respectively).  Status: Continued - Date(s): 3/8/21    Intervention(s)  Therapist will engage patient in person-centered therapy and CBT.    Patient has reviewed and agreed to the above plan.      MICAELA SLADE  2021  "

## 2021-05-03 NOTE — PATIENT INSTRUCTIONS
Prioritize appointments  If continuing to have problems connecting, call [facility]'s [employer] support at [phone].

## 2021-05-04 ENCOUNTER — RECORDS - HEALTHEAST (OUTPATIENT)
Dept: ADMINISTRATIVE | Facility: OTHER | Age: 66
End: 2021-05-04

## 2021-05-07 ENCOUNTER — RECORDS - HEALTHEAST (OUTPATIENT)
Dept: ADMINISTRATIVE | Facility: OTHER | Age: 66
End: 2021-05-07

## 2021-05-08 ENCOUNTER — HEALTH MAINTENANCE LETTER (OUTPATIENT)
Age: 66
End: 2021-05-08

## 2021-05-12 ENCOUNTER — COMMUNICATION - HEALTHEAST (OUTPATIENT)
Dept: LAB | Facility: CLINIC | Age: 66
End: 2021-05-12

## 2021-05-12 ENCOUNTER — VIRTUAL VISIT (OUTPATIENT)
Dept: PSYCHOLOGY | Facility: CLINIC | Age: 66
End: 2021-05-12
Payer: MEDICARE

## 2021-05-12 DIAGNOSIS — Z13.220 SCREENING FOR HYPERLIPIDEMIA: ICD-10-CM

## 2021-05-12 DIAGNOSIS — E11.9 TYPE 2 DIABETES MELLITUS WITHOUT COMPLICATION, WITHOUT LONG-TERM CURRENT USE OF INSULIN (H): ICD-10-CM

## 2021-05-12 DIAGNOSIS — F41.1 GAD (GENERALIZED ANXIETY DISORDER): ICD-10-CM

## 2021-05-12 DIAGNOSIS — F33.1 MAJOR DEPRESSIVE DISORDER, RECURRENT EPISODE, MODERATE WITH ANXIOUS DISTRESS (H): Primary | ICD-10-CM

## 2021-05-12 PROCEDURE — 90834 PSYTX W PT 45 MINUTES: CPT | Mod: 95 | Performed by: SOCIAL WORKER

## 2021-05-12 NOTE — PROGRESS NOTES
Progress Note    Patient Name: Randi Cleary  Date: 5/12/21         Service Type: Individual      Session Start Time: 1:01 PM  Session End Time: 1:46 PM     Session Length: 45 minutes    Session #: 40    Attendees: Client attended alone    Service Modality:  Video Visit:    Telemedicine Visit: The patient's condition can be safely assessed and treated via synchronous audio and visual telemedicine encounter.      Reason for Telemedicine Visit: Services only offered telehealth    Originating Site (Patient Location): Patient's home    Distant Site (Provider Location): Provider Remote Setting    Consent:  The patient/guardian has verbally consented to: the potential risks and benefits of telemedicine (video visit) versus in person care; bill my insurance or make self-payment for services provided; and responsibility for payment of non-covered services.     Mode of Communication:  Video Conference via Re[employer]    As the provider I attest to compliance with applicable laws and regulations related to telemedicine.      Treatment Plan Last Reviewed: 3/8/21  PHQ-9 / CHAVO-7 :   PHQ 4/15/2021 4/28/2021 5/12/2021   PHQ-9 Total Score 11 15 12   Q9: Thoughts of better off dead/self-harm past 2 weeks Several days Not at all Not at all   F/U: Thoughts of suicide or self-harm No - -   F/U: Self harm-plan - - -   F/U: Self-harm action - - -   F/U: Safety concerns No - -     CHAVO-7 SCORE 3/18/2021 4/15/2021 5/12/2021   Total Score 10 (moderate anxiety) 9 (mild anxiety) 9 (mild anxiety)   Total Score 10 9 9         DATA  Interactive Complexity: No  Crisis: No       Progress Since Last Session (Related to Symptoms / Goals / Homework):   Symptoms: Patient had a low day yesterday and has been overwhelmed some, but is doing better lately    Homework: Achieved / completed to satisfaction  Prioritize appointments       Episode of Care Goals: Satisfactory progress - ACTION (Actively working towards  change); Intervened by reinforcing change plan / affirming steps taken     Current / Ongoing Stressors and Concerns:   Patient is currently socially isolated. She has a conflictual relationship with her .  She is getting minimal physical activity.  She had recent eye surgery     Treatment Objective(s) Addressed in This Session:   Patient will increase frequency of engaging her in ADLs.  Patient will track and record at least 5 pleasant exchanges with . Patient will be able to identify at least 5 positive traits about her .  Patient will reduce level of depressive and anxious features as evidenced by reduction in score on her CHAVO-7 and PHQ-9 (scores of 15 and 16 at first measurment, respectively).     Intervention:   CBT: Person-Centered Therapy: Patient reported having a low day yesterday, but overall is doing better. Talked about how she has realized her sodium levels may be impacting her and reducing her sodium intake to try and improve her health. Talked about providers she is seeing to get support and how she is prioritizing them right now.        ASSESSMENT: Current Emotional / Mental Status (status of significant symptoms):   Risk status (Self / Other harm or suicidal ideation)   Patient denies current fears or concerns for personal safety.   Patient denies current or recent suicidal ideation or behaviors.   Patient denies current or recent homicidal ideation or behaviors.   Patient denies current or recent self injurious behavior or ideation.   Patient denies other safety concerns.   Patient reports there has been no change in risk factors since their last session.     Patient reports there has been no change in protective factors since their last session.     A safety and risk management plan has been developed including: Patient consented to co-developed safety plan.  Safety and risk management plan was completed.  Patient agreed to use safety plan should any safety concerns arise.  A  copy was given to the patient. This was done in a previous session.     Appearance:   Appropriate    Eye Contact:   Fair    Psychomotor Behavior: Normal    Attitude:   Cooperative    Orientation:   All   Speech    Rate / Production: Normal/ Responsive    Volume:  Normal    Mood:    frustrated, yet hopeful   Affect:    Bright    Thought Content:  Clear    Thought Form:  Coherent    Insight:    Fair      Medication Review:   No changes to current psychiatric medication(s)     Medication Compliance:   Yes     Changes in Health Issues:   None reported     Chemical Use Review:   Substance Use: Chemical use reviewed, no active concerns identified      Tobacco Use: No current tobacco use.      Diagnosis:  1. Major depressive disorder, recurrent episode, moderate with anxious distress (H)    2. CHAVO (generalized anxiety disorder)      Collateral Reports Completed:   Not Applicable    PLAN: (Patient Tasks / Therapist Tasks / Other)  Focus on eating healthy.    Next session trying to come across how I want to come across, work on more emotional regulation        MICAELA SLADE    May 12, 2021                                                         ______________________________________________________________________    Treatment Plan    Patient's Name: Randi Cleary  YOB: 1955    Date: 3/8/21    DSM5 Diagnoses: 296.32 (F33.1) Major Depressive Disorder, Recurrent Episode, Moderate With anxious distress  Psychosocial / Contextual Factors: Patient's entire family of origin has , she now has a sister-in-law and  as support.  Relationship with  is conflictual.  Patient is reporting increase in physical symptoms due to COVID-19.  Patient is off of pain medications.  WHODAS: 42    Referral / Collaboration:  Referral to another professional/service is not indicated at this time.     Anticipated number of session or this episode of care: 12-15      MeasurableTreatment Goal(s) related to  "diagnosis / functional impairment(s)  Goal 1: Patient will \"jumpstart, getting going with the things I need to be doing around the house as far as picking up, doing things, trying to do something every day.  Also to lessen the animosity between me and my .\"    I will know I've met my goal when my shoulders are fixed and I can see.      Objective #A (Patient Action)    Patient will increase frequency of engaging her in ADLs.  Status: Continued - Date(s): 3/8/21    Intervention(s)  Therapist will engage patient in CBT, specifically behavioral activation.    Objective #B  Patient will track and record at least 5 pleasant exchanges with . Patient will be able to identify at least 5 positive traits about her  and how he relates to her.  Status: Continued - Date(s): 3/8/21    Intervention(s)  Therapist will teach assertiveness skills and assign homework related to relationship interactions.    Objective #C  Patient will reduce level of depressive and anxious features as evidenced by reduction in score on her CHAVO-7 and PHQ-9 (scores of 15 and 16 at first measurment, respectively).  Status: Continued - Date(s): 3/8/21    Intervention(s)  Therapist will engage patient in person-centered therapy and CBT.    Patient has reviewed and agreed to the above plan.      MICAELA SLADE  March 8, 2021  "

## 2021-05-14 ENCOUNTER — AMBULATORY - HEALTHEAST (OUTPATIENT)
Dept: LAB | Facility: CLINIC | Age: 66
End: 2021-05-14

## 2021-05-14 ENCOUNTER — RECORDS - HEALTHEAST (OUTPATIENT)
Dept: SCHEDULING | Facility: CLINIC | Age: 66
End: 2021-05-14

## 2021-05-14 DIAGNOSIS — E11.9 TYPE 2 DIABETES MELLITUS WITHOUT COMPLICATION, WITHOUT LONG-TERM CURRENT USE OF INSULIN (H): ICD-10-CM

## 2021-05-14 DIAGNOSIS — R06.02 SHORTNESS OF BREATH: ICD-10-CM

## 2021-05-14 DIAGNOSIS — E83.119 HEMOCHROMATOSIS, UNSPECIFIED HEMOCHROMATOSIS TYPE: ICD-10-CM

## 2021-05-14 DIAGNOSIS — E03.9 ACQUIRED HYPOTHYROIDISM: ICD-10-CM

## 2021-05-14 DIAGNOSIS — I27.20 PULMONARY HYPERTENSION (H): ICD-10-CM

## 2021-05-14 LAB
ANION GAP SERPL CALCULATED.3IONS-SCNC: 9 MMOL/L (ref 5–18)
BUN SERPL-MCNC: 16 MG/DL (ref 8–22)
CALCIUM SERPL-MCNC: 9.1 MG/DL (ref 8.5–10.5)
CHLORIDE BLD-SCNC: 105 MMOL/L (ref 98–107)
CO2 SERPL-SCNC: 29 MMOL/L (ref 22–31)
CREAT SERPL-MCNC: 0.69 MG/DL (ref 0.6–1.1)
ERYTHROCYTE [DISTWIDTH] IN BLOOD BY AUTOMATED COUNT: 12.3 % (ref 11–14.5)
FERRITIN SERPL-MCNC: 66 NG/ML (ref 10–130)
GFR SERPL CREATININE-BSD FRML MDRD: >60 ML/MIN/1.73M2
GLUCOSE BLD-MCNC: 127 MG/DL (ref 70–125)
HBA1C MFR BLD: 5.4 %
HCT VFR BLD AUTO: 46.6 % (ref 35–47)
HGB BLD-MCNC: 16.3 G/DL (ref 12–16)
MCH RBC QN AUTO: 33.6 PG (ref 27–34)
MCHC RBC AUTO-ENTMCNC: 35 G/DL (ref 32–36)
MCV RBC AUTO: 96 FL (ref 80–100)
PLATELET # BLD AUTO: 214 THOU/UL (ref 140–440)
PMV BLD AUTO: 8.2 FL (ref 7–10)
POTASSIUM BLD-SCNC: 4.5 MMOL/L (ref 3.5–5)
RBC # BLD AUTO: 4.85 MILL/UL (ref 3.8–5.4)
SODIUM SERPL-SCNC: 143 MMOL/L (ref 136–145)
TSH SERPL DL<=0.005 MIU/L-ACNC: 0.87 UIU/ML (ref 0.3–5)
WBC: 6.3 THOU/UL (ref 4–11)

## 2021-05-16 LAB — NT-PROBNP SERPL-MCNC: 63 PG/ML (ref 0–125)

## 2021-05-16 ASSESSMENT — ENCOUNTER SYMPTOMS
INCREASED ENERGY: 1
PANIC: 1
MEMORY LOSS: 1
NERVOUS/ANXIOUS: 0
FEVER: 0
NIGHT SWEATS: 0
POLYPHAGIA: 0
ARTHRALGIAS: 1
CHILLS: 0
DIZZINESS: 1
DECREASED APPETITE: 0
WEAKNESS: 1
WEIGHT LOSS: 1
MUSCLE CRAMPS: 0
NUMBNESS: 1
MYALGIAS: 1
SEIZURES: 0
HEADACHES: 1
HALLUCINATIONS: 0
INSOMNIA: 1
WEIGHT GAIN: 0
DECREASED CONCENTRATION: 0
LOSS OF CONSCIOUSNESS: 0
TINGLING: 1
JOINT SWELLING: 1
STIFFNESS: 1
FATIGUE: 1
NECK PAIN: 1
POLYDIPSIA: 0
DEPRESSION: 1
PARALYSIS: 0
BACK PAIN: 1
ALTERED TEMPERATURE REGULATION: 0
DISTURBANCES IN COORDINATION: 1
MUSCLE WEAKNESS: 1
TREMORS: 0
SPEECH CHANGE: 0

## 2021-05-17 ENCOUNTER — COMMUNICATION - HEALTHEAST (OUTPATIENT)
Dept: NURSING | Facility: CLINIC | Age: 66
End: 2021-05-17

## 2021-05-17 ENCOUNTER — AMBULATORY - HEALTHEAST (OUTPATIENT)
Dept: PALLIATIVE MEDICINE | Facility: OTHER | Age: 66
End: 2021-05-17

## 2021-05-17 NOTE — PROGRESS NOTES
Francisco J Edwards M.D.  Cardiovascular Medicine          Loida Stockton MD    [address] [place] [address]    [phone] (Fax)          Reina Morillo MD    [address] [place], [place] [id number] [phone]     Problem List  1. Possible pulmonary hypertension  2. History of breast cancer              History of mastectomy              Breast reconstruction  3. Atherosclerosis with coronary calcifications  4. Hiatal hernia  5. Hepatic steatosis  6. Diverticulosis  7. KYAW with treatment  8. Psoriasis  9. Grave's disease with ablation  10. Hypertension  11. Asthma  12. Hemochromatosis,gene +  13. Bipolar disorder ?  14. Chronic pain with narcotic dependence  15. Pheochromocytoma (right surgically removed)  16. Elevated body mass index  17. Elevate hemoglobin with macrocytosis  18  Hiatal hernia  19. Chronic pain management  Patient is vaccinated      Impression/ Plan  1.Please set up appointment with Dr. Gregory, sleep medicine, high hemoglobin  2. RTC me November  3, Lipid profile, free T4, TSH today and send to Dr. John Paul Fernando patient has lost 60 pounds and is off of ETOH and antidepressants.  Her main problem is restless legs.  She has high ecent free T4    The patient returns for follow-up of heart failure/pulmonaryh hypetension. .  There is no interim history of chest pain, tightness, paroxysmal nocturnal dyspnea, orthopnea, peripheral edema, palpitation, pre-syncope, syncope, device discharge.  Exercise tolerance is stable.  The patient is attempting to exercise regularly and following a sodium restricted, calorically appropriate diet.  Medications are reviewed and the patient is taking medications as prescribed.  The patient is generally sleeping well.  30 minutes counseling and review of outside recent events including carpal tunnel, withdrawal from anti-depressants, serotonin syndrome.     Wt Readings from Last 24 Encounters:   03/04/21 99.8  kg (220 lb)   02/25/21 100.7 kg (222 lb 0.1 oz)   01/07/21 125.6 kg (277 lb)   03/09/20 125.6 kg (277 lb)   02/12/20 125.6 kg (277 lb)   12/11/19 119.3 kg (263 lb)   11/18/19 122.5 kg (270 lb)   10/22/19 120.2 kg (265 lb)   09/25/19 118.5 kg (261 lb 3.2 oz)   09/24/19 119 kg (262 lb 4.8 oz)   09/19/19 119.4 kg (263 lb 3.2 oz)   09/13/19 120.7 kg (266 lb 1.6 oz)   09/04/19 118.9 kg (262 lb 3.2 oz)   08/13/19 117.5 kg (259 lb)   06/29/15 92.1 kg (203 lb)              Meds  Current Outpatient Medications   Medication     albuterol (ACCUNEB) 0.63 MG/3ML neb solution     albuterol (PROAIR HFA/PROVENTIL HFA/VENTOLIN HFA) 108 (90 Base) MCG/ACT inhaler     albuterol (PROVENTIL) (2.5 MG/3ML) 0.083% neb solution     ASPIRIN PO     Cholecalciferol (VITAMIN D3) 250 MCG (51684 UT) TABS     Cyanocobalamin (VITAMIN B-12) 5000 MCG SUBL     diclofenac (VOLTAREN) 1 % topical gel     ferrous fumarate 65 mg, [place]. FE,-Vitamin C 125 mg (VITRON-C)  MG TABS tablet     Fluticasone-Umeclidin-Vilanterol (TRELEGY ELLIPTA) 100-62.5-25 MCG/INH oral inhaler     furosemide (LASIX) 20 MG tablet     levothyroxine (SYNTHROID/LEVOTHROID) 150 MCG tablet     losartan (COZAAR) 25 MG tablet     Omeprazole (PRILOSEC PO)     potassium chloride ER (KLOR-CON M) 20 MEQ CR tablet     rOPINIRole (REQUIP) 2 MG tablet     No current facility-administered medications for this visit.          Labs    Results for GEETA BOSS (MRN [id number]) as of 5/17/2021 16:41   Ref. Range 2/5/2021 11:13   Sodium Latest Ref Range: 133 - 144 mmol/L 139   Potassium Latest Ref Range: 3.4 - 5.3 mmol/L 3.9   Chloride Latest Ref Range: 94 - 109 mmol/L 100   Carbon Dioxide Latest Ref Range: 20 - 32 mmol/L 30   Urea Nitrogen Latest Ref Range: 7 - 30 mg/dL 12   Creatinine Latest Ref Range: 0.52 - 1.04 mg/dL 0.64   GFR Estimate Latest Ref Range: >60 mL/min/1.73_m2 >90   GFR Estimate If Black Latest Ref Range: >60 mL/min/1.73_m2 >90   Calcium Latest Ref Range: 8.5 - 10.1 mg/dL  9.7   Anion Gap Latest Ref Range: 3 - 14 mmol/L 8   Albumin Latest Ref Range: 3.4 - 5.0 g/dL 3.9   Protein Total Latest Ref Range: 6.8 - 8.8 g/dL 8.0   Bilirubin Total Latest Ref Range: 0.2 - 1.3 mg/dL 0.6   Alkaline Phosphatase Latest Ref Range: 40 - 150 U/L 103   ALT Latest Ref Range: 0 - 50 U/L 32   AST Latest Ref Range: 0 - 45 U/L 22   Ferritin Latest Ref Range: 8 - 252 ng/mL 48   Iron Latest Ref Range: 35 - 180 ug/dL 141   Iron Binding Cap Latest Ref Range: 240 - 430 ug/dL 335   Iron Saturation Index Latest Ref Range: 15 - 46 % 42   N-Terminal Pro Bnp Latest Ref Range: 0 - 125 pg/mL 43   T4 Free Latest Ref Range: 0.76 - 1.46 ng/dL 1.57 (H)   Triiodothyronine (T3) Latest Ref Range: 60 - 181 ng/dL 130   TSH Latest Ref Range: 0.40 - 4.00 mU/L 0.65     Imaging     Right Heart Pressures     2019    RA  A-wave: 16 mmHg  V-wave: 16 mmHg  Mean: 15 mmHg   HR: 88 bpm  RV  Systolic: 44 mmHg  End Diastolic: 16 mmHg  HR: 88 bpm    PA  Systolic:41 mmHg  Diasotlic: 24 mmHg  Mean: 31 mmHg  HR: 88 bpm  PA Sat: 70.8%    PCW  A-wave: 18 mmHg  V-wave: 17 mmHg  Mean: 16 mmHg  HR: 91 bpm    Cardiac Output  CO Jeannie: 5.72 L/min  CI Jeannie: 2.56 L/min/m2  CO TD: Not performed  CI TD: Not performed     Vitals  BP: 168 mmHg / 74 mmHg  SpO2: 92 %  PA Stat: 70.8 %         Echocardiogram        Name: ERIK BOSS  MRN: [id number]  : 1955  Study Date: 2019 03:04 PM  Age: 64 yrs  Gender: Female  Patient Location: [facility]  Reason For Study: Pulmonary hypertension (H)  Ordering Physician: MINO CORTES  Referring Physician: MINO CORTES  Performed By: Siobhan Dee ASHTYN     BSA: 2.2 m2  Height: 66 in  Weight: 259 lb  BP: 118/76 mmHg  _____________________________________________________________________________  __        Procedure  Echocardiogram with two-dimensional, color and spectral Doppler performed.  Poor acoustic windows. Optison (NDC #3031-7506-62) given intravenously.  Patient was given 3.0 ml  mixture of 3 ml Optison and 6 ml saline. 6.0 ml  wasted. IV start location R Hand .  _____________________________________________________________________________  __        Interpretation Summary  1. Global and regional left ventricular function is normal with an EF of 55-  60%.  2. The right ventricle is normal size. Global right ventricular function is  mildly reduced.  3. No significant valvular disease.  4. Unable to assess pulmonary artery pressure.  5. IVC diameter <2.1 cm collapsing >50% with sniff suggests a normal RA  pressure of 3 mmHg.       Assessment/Plan     The patient has evidence of volume overload and poorly controlled blood pressure.               CC: physicians above and    Dusty Dubois M.D.  ([facility]        Answers for HPI/ROS submitted by the patient on 5/16/2021   General Symptoms: Yes  Skin Symptoms: No  HENT Symptoms: No  EYE SYMPTOMS: No  HEART SYMPTOMS: No  LUNG SYMPTOMS: No  INTESTINAL SYMPTOMS: No  URINARY SYMPTOMS: No  GYNECOLOGIC SYMPTOMS: No  BREAST SYMPTOMS: No  SKELETAL SYMPTOMS: Yes  BLOOD SYMPTOMS: No  NERVOUS SYSTEM SYMPTOMS: Yes  MENTAL HEALTH SYMPTOMS: Yes  Fever: No  Loss of appetite: No  Weight loss: Yes  Weight gain: No  Fatigue: Yes  Night sweats: No  Chills: No  Increased stress: Yes  Excessive hunger: No  Excessive thirst: No  Feeling hot or cold when others believe the temperature is normal: No  Loss of height: No  Post-operative complications: No  Surgical site pain: No  Hallucinations: No  Change in or Loss of Energy: Yes  Hyperactivity: No  Confusion: No  Back pain: Yes  Muscle aches: Yes  Neck pain: Yes  Swollen joints: Yes  Joint pain: Yes  Bone pain: No  Muscle cramps: No  Muscle weakness: Yes  Joint stiffness: Yes  Bone fracture: No  Trouble with coordination: Yes  Dizziness or trouble with balance: Yes  Fainting or black-out spells: No  Memory loss: Yes  Headache: Yes  Seizures: No  Speech problems: No  Tingling:  Yes  Tremor: No  Weakness: Yes  Difficulty walking: No  Paralysis: No  Numbness: Yes  Nervous or Anxious: No  Depression: Yes  Trouble sleeping: Yes  Trouble thinking or concentrating: No  Mood changes: No  Panic attacks: Yes

## 2021-05-18 ENCOUNTER — OFFICE VISIT (OUTPATIENT)
Dept: CARDIOLOGY | Facility: CLINIC | Age: 66
End: 2021-05-18
Attending: INTERNAL MEDICINE
Payer: MEDICARE

## 2021-05-18 VITALS
BODY MASS INDEX: 35.82 KG/M2 | HEIGHT: 65 IN | SYSTOLIC BLOOD PRESSURE: 125 MMHG | HEART RATE: 82 BPM | DIASTOLIC BLOOD PRESSURE: 76 MMHG | WEIGHT: 215 LBS | OXYGEN SATURATION: 94 %

## 2021-05-18 DIAGNOSIS — R79.9 ABNORMAL FINDING OF BLOOD CHEMISTRY, UNSPECIFIED: ICD-10-CM

## 2021-05-18 DIAGNOSIS — I27.20 PULMONARY HYPERTENSION (H): ICD-10-CM

## 2021-05-18 DIAGNOSIS — R06.02 SOB (SHORTNESS OF BREATH): ICD-10-CM

## 2021-05-18 DIAGNOSIS — I50.9 HEART FAILURE (H): ICD-10-CM

## 2021-05-18 LAB
CHOLEST SERPL-MCNC: 180 MG/DL
CHOLEST SERPL-MCNC: 180 MG/DL
HDLC SERPL-MCNC: 78 MG/DL
HDLC SERPL-MCNC: 78 MG/DL
LDLC SERPL CALC-MCNC: 83 MG/DL
LDLC SERPL CALC-MCNC: 83 MG/DL
NON HDL CHOL. (LDL+VLDL): 103 MG/DL
NONHDLC SERPL-MCNC: 103 MG/DL
NT-PROBNP SERPL-MCNC: 66 PG/ML (ref 0–125)
TRIGL SERPL-MCNC: 101 MG/DL
TRIGLYCERIDES (HISTORICAL CONVERSION): 101 MG/DL
TSH SERPL DL<=0.005 MIU/L-ACNC: 1.31 MU/L (ref 0.4–4)

## 2021-05-18 PROCEDURE — G0463 HOSPITAL OUTPT CLINIC VISIT: HCPCS

## 2021-05-18 PROCEDURE — 99214 OFFICE O/P EST MOD 30 MIN: CPT | Performed by: INTERNAL MEDICINE

## 2021-05-18 PROCEDURE — 84443 ASSAY THYROID STIM HORMONE: CPT | Performed by: PATHOLOGY

## 2021-05-18 PROCEDURE — 83880 ASSAY OF NATRIURETIC PEPTIDE: CPT | Performed by: PATHOLOGY

## 2021-05-18 PROCEDURE — 80061 LIPID PANEL: CPT | Performed by: PATHOLOGY

## 2021-05-18 PROCEDURE — 36415 COLL VENOUS BLD VENIPUNCTURE: CPT | Performed by: PATHOLOGY

## 2021-05-18 ASSESSMENT — PAIN SCALES - GENERAL: PAINLEVEL: NO PAIN (0)

## 2021-05-18 ASSESSMENT — MIFFLIN-ST. JEOR: SCORE: 1504.23

## 2021-05-18 NOTE — NURSING NOTE
Chief Complaint   Patient presents with     Follow Up     8 month f/u with labs     Vitals were taken and medications reconciled.    Tong Landeros, EMT  8:46 AM

## 2021-05-18 NOTE — PATIENT INSTRUCTIONS
Medication Changes:  No medication changes at this time. Please continue current medication regiment.    Patient Instructions:  1. Continue staying active and eat a heart healthy diet.    2. Please keep current list of medications with you at all times.    3. Remember to weigh yourself daily after voiding and before you consume any food or beverages and log the numbers.  If you have gained 2 pounds overnight or 5 pounds in a week contact us immediately for medication adjustments or further instructions.    4. **Please call us immediately if you have any syncope (fainting or passing out), chest pain, edema (swelling or weight gain), or decline in your functional status (general decline in how you are feeling).    Follow up Appointment Information:  Follow up in November  Set up an appointment to see Dr. Gregory in sleep medicine  Follow up with Dr. Serrano        We are located [facility] the third [facility].  Our address is     [address] [place] [address] on [address] [place] [address]    Thank you for allowing us to be a part of your care here at the [facility]    If you have questions or concerns please contact us at:    Guille Walden RN, BSN   Josephine Pratt (Schedule,Prior Auth)  Nurse Coordinator     Clinic   Pulmonary Hypertension   Pulmonary Hypertension  [facility]  [facility]  (Phone)[phone]    (Phone) [phone]        (Fax) [phone]    ** Please note that you will NOT receive a reminder call regarding your scheduled testing, reminder calls are for provider appointments only.  If you are scheduled for testing within the [facility] system you may receive a call regarding pre-registration for billing purposes only.**     Remember to weigh yourself daily after voiding and before you consume any food or beverages and log the  numbers.  If you have gained/lost 2 pounds overnight or 5 pounds in a week contact us immediately for medication adjustments or further instructions.   **Please call us immediately if you have any syncope, chest pain, edema, or decline in your functional status.    Support Group:  Pulmonary Hypertension Association  Https://www.phassociation.org/  **Look at the Events Tab** They even have Support Groups that you can call into    [facility] Support Group  Second Saturday of the Month from 1-3 PM   Location: [address] [place] [address]  Leader: Corry Harvey  Phone: [phone] or [phone]  Email: [email or web address]

## 2021-05-18 NOTE — NURSING NOTE
Pts plan of care per Francisco J Edwards MD:    Impression/ Plan  1.Please set up appointment with Dr. Gregory, sleep medicine, high hemoglobin  2. RTC me November  3, Lipid profile, free T4, TSH today and send to Dr. John Paul Fernando patient has lost 60 pounds and is off of ETOH and antidepressants.  Her main problem is restless legs.  She has high ecent free T4    Orders placed and message sent to [facility].  Gladis Stanley RN on 5/18/2021 at 9:33 AM

## 2021-05-18 NOTE — LETTER
5/18/2021      RE: Randi Cleary  [address]  [facility] [address]       Dear Colleague,    Thank you for the opportunity to participate in the care of your patient, Randi Cleary, at the [facility] [place] at [facility]. Please see a copy of my visit note below.    Francisco J Edwards M.D.  Cardiovascular Medicine      Loida Stockton MD    [address], [place] [id number] [phone] (Fax)          Reina Morillo MD    [address] [place] [address] [phone]     Problem List  1. Possible pulmonary hypertension  2. History of breast cancer              History of mastectomy              Breast reconstruction  3. Atherosclerosis with coronary calcifications  4. Hiatal hernia  5. Hepatic steatosis  6. Diverticulosis  7. KYAW with treatment  8. Psoriasis  9. Grave's disease with ablation  10. Hypertension  11. Asthma  12. Hemochromatosis,gene +  13. Bipolar disorder ?  14. Chronic pain with narcotic dependence  15. Pheochromocytoma (right surgically removed)  16. Elevated body mass index  17. Elevate hemoglobin with macrocytosis  18  Hiatal hernia  19. Chronic pain management  Patient is vaccinated      Impression/ Plan  1.Please set up appointment with Dr. Gregory, sleep medicine, high hemoglobin  2. RTC me November  3, Lipid profile, free T4, TSH today and send to Dr. John Paul Fernando patient has lost 60 pounds and is off of ETOH and antidepressants.  Her main problem is restless legs.  She has high ecent free T4    The patient returns for follow-up of heart failure/pulmonaryh hypetension. .  There is no interim history of chest pain, tightness, paroxysmal nocturnal dyspnea, orthopnea, peripheral edema, palpitation, pre-syncope, syncope, device discharge.  Exercise tolerance is stable.  The patient is attempting to exercise regularly and following a sodium  restricted, calorically appropriate diet.  Medications are reviewed and the patient is taking medications as prescribed.  The patient is generally sleeping well.  30 minutes counseling and review of outside recent events including carpal tunnel, withdrawal from anti-depressants, serotonin syndrome.     Wt Readings from Last 24 Encounters:   03/04/21 99.8 kg (220 lb)   02/25/21 100.7 kg (222 lb 0.1 oz)   01/07/21 125.6 kg (277 lb)   03/09/20 125.6 kg (277 lb)   02/12/20 125.6 kg (277 lb)   12/11/19 119.3 kg (263 lb)   11/18/19 122.5 kg (270 lb)   10/22/19 120.2 kg (265 lb)   09/25/19 118.5 kg (261 lb 3.2 oz)   09/24/19 119 kg (262 lb 4.8 oz)   09/19/19 119.4 kg (263 lb 3.2 oz)   09/13/19 120.7 kg (266 lb 1.6 oz)   09/04/19 118.9 kg (262 lb 3.2 oz)   08/13/19 117.5 kg (259 lb)   06/29/15 92.1 kg (203 lb)              Meds  Current Outpatient Medications   Medication     albuterol (ACCUNEB) 0.63 MG/3ML neb solution     albuterol (PROAIR HFA/PROVENTIL HFA/VENTOLIN HFA) 108 (90 Base) MCG/ACT inhaler     albuterol (PROVENTIL) (2.5 MG/3ML) 0.083% neb solution     ASPIRIN PO     Cholecalciferol (VITAMIN D3) 250 MCG (06112 UT) TABS     Cyanocobalamin (VITAMIN B-12) 5000 MCG SUBL     diclofenac (VOLTAREN) 1 % topical gel     ferrous fumarate 65 mg, [place]. FE,-Vitamin C 125 mg (VITRON-C)  MG TABS tablet     Fluticasone-Umeclidin-Vilanterol (TRELEGY ELLIPTA) 100-62.5-25 MCG/INH oral inhaler     furosemide (LASIX) 20 MG tablet     levothyroxine (SYNTHROID/LEVOTHROID) 150 MCG tablet     losartan (COZAAR) 25 MG tablet     Omeprazole (PRILOSEC PO)     potassium chloride ER (KLOR-CON M) 20 MEQ CR tablet     rOPINIRole (REQUIP) 2 MG tablet     No current facility-administered medications for this visit.          Labs    Results for RYAN GEETA ROBERTO (MRN [id number]) as of 5/17/2021 16:41   Ref. Range 2/5/2021 11:13   Sodium Latest Ref Range: 133 - 144 mmol/L 139   Potassium Latest Ref Range: 3.4 - 5.3 mmol/L 3.9   Chloride Latest  Ref Range: 94 - 109 mmol/L 100   Carbon Dioxide Latest Ref Range: 20 - 32 mmol/L 30   Urea Nitrogen Latest Ref Range: 7 - 30 mg/dL 12   Creatinine Latest Ref Range: 0.52 - 1.04 mg/dL 0.64   GFR Estimate Latest Ref Range: >60 mL/min/1.73_m2 >90   GFR Estimate If Black Latest Ref Range: >60 mL/min/1.73_m2 >90   Calcium Latest Ref Range: 8.5 - 10.1 mg/dL 9.7   Anion Gap Latest Ref Range: 3 - 14 mmol/L 8   Albumin Latest Ref Range: 3.4 - 5.0 g/dL 3.9   Protein Total Latest Ref Range: 6.8 - 8.8 g/dL 8.0   Bilirubin Total Latest Ref Range: 0.2 - 1.3 mg/dL 0.6   Alkaline Phosphatase Latest Ref Range: 40 - 150 U/L 103   ALT Latest Ref Range: 0 - 50 U/L 32   AST Latest Ref Range: 0 - 45 U/L 22   Ferritin Latest Ref Range: 8 - 252 ng/mL 48   Iron Latest Ref Range: 35 - 180 ug/dL 141   Iron Binding Cap Latest Ref Range: 240 - 430 ug/dL 335   Iron Saturation Index Latest Ref Range: 15 - 46 % 42   N-Terminal Pro Bnp Latest Ref Range: 0 - 125 pg/mL 43   T4 Free Latest Ref Range: 0.76 - 1.46 ng/dL 1.57 (H)   Triiodothyronine (T3) Latest Ref Range: 60 - 181 ng/dL 130   TSH Latest Ref Range: 0.40 - 4.00 mU/L 0.65     Imaging     Right Heart Pressures     2019    RA  A-wave: 16 mmHg  V-wave: 16 mmHg  Mean: 15 mmHg   HR: 88 bpm  RV  Systolic: 44 mmHg  End Diastolic: 16 mmHg  HR: 88 bpm    PA  Systolic:41 mmHg  Diasotlic: 24 mmHg  Mean: 31 mmHg  HR: 88 bpm  PA Sat: 70.8%    PCW  A-wave: 18 mmHg  V-wave: 17 mmHg  Mean: 16 mmHg  HR: 91 bpm    Cardiac Output  CO Jeannie: 5.72 L/min  CI Jeannie: 2.56 L/min/m2  CO TD: Not performed  CI TD: Not performed     Vitals  BP: 168 mmHg / 74 mmHg  SpO2: 92 %  PA Stat: 70.8 %         Echocardiogram        Name: ERIK BOSS  MRN: [id number]  : 1955  Study Date: 2019 03:04 PM  Age: 64 yrs  Gender: Female  Patient Location: [facility]  Reason For Study: Pulmonary hypertension (H)  Ordering Physician: MINO CORTES  Referring Physician: MINO CORTES  Performed By: Siobhan  TRISTA Dee     BSA: 2.2 m2  Height: 66 in  Weight: 259 lb  BP: 118/76 mmHg  _____________________________________________________________________________  __        Procedure  Echocardiogram with two-dimensional, color and spectral Doppler performed.  Poor acoustic windows. Optison (NDC #8729-5606-44) given intravenously.  Patient was given 3.0 ml mixture of 3 ml Optison and 6 ml saline. 6.0 ml  wasted. IV start location R Hand .  _____________________________________________________________________________  __        Interpretation Summary  1. Global and regional left ventricular function is normal with an EF of 55-  60%.  2. The right ventricle is normal size. Global right ventricular function is  mildly reduced.  3. No significant valvular disease.  4. Unable to assess pulmonary artery pressure.  5. IVC diameter <2.1 cm collapsing >50% with sniff suggests a normal RA  pressure of 3 mmHg.       Assessment/Plan     The patient has evidence of volume overload and poorly controlled blood pressure.               CC: physicians above and    Dusty Dubois M.D.  ([facility]        Answers for HPI/ROS submitted by the patient on 5/16/2021   General Symptoms: Yes  Skin Symptoms: No  HENT Symptoms: No  EYE SYMPTOMS: No  HEART SYMPTOMS: No  LUNG SYMPTOMS: No  INTESTINAL SYMPTOMS: No  URINARY SYMPTOMS: No  GYNECOLOGIC SYMPTOMS: No  BREAST SYMPTOMS: No  SKELETAL SYMPTOMS: Yes  BLOOD SYMPTOMS: No  NERVOUS SYSTEM SYMPTOMS: Yes  MENTAL HEALTH SYMPTOMS: Yes  Fever: No  Loss of appetite: No  Weight loss: Yes  Weight gain: No  Fatigue: Yes  Night sweats: No  Chills: No  Increased stress: Yes  Excessive hunger: No  Excessive thirst: No  Feeling hot or cold when others believe the temperature is normal: No  Loss of height: No  Post-operative complications: No  Surgical site pain: No  Hallucinations: No  Change in or Loss of Energy: Yes  Hyperactivity: No  Confusion: No  Back pain: Yes  Muscle  aches: Yes  Neck pain: Yes  Swollen joints: Yes  Joint pain: Yes  Bone pain: No  Muscle cramps: No  Muscle weakness: Yes  Joint stiffness: Yes  Bone fracture: No  Trouble with coordination: Yes  Dizziness or trouble with balance: Yes  Fainting or black-out spells: No  Memory loss: Yes  Headache: Yes  Seizures: No  Speech problems: No  Tingling: Yes  Tremor: No  Weakness: Yes  Difficulty walking: No  Paralysis: No  Numbness: Yes  Nervous or Anxious: No  Depression: Yes  Trouble sleeping: Yes  Trouble thinking or concentrating: No  Mood changes: No  Panic attacks: Yes        Please do not hesitate to contact me if you have any questions/concerns.     Sincerely,     Francisco J Edwards MD

## 2021-05-20 ENCOUNTER — RECORDS - HEALTHEAST (OUTPATIENT)
Dept: ADMINISTRATIVE | Facility: OTHER | Age: 66
End: 2021-05-20

## 2021-05-20 ENCOUNTER — HOSPITAL ENCOUNTER (OUTPATIENT)
Dept: PALLIATIVE MEDICINE | Facility: OTHER | Age: 66
Discharge: HOME | End: 2021-05-20
Attending: ANESTHESIOLOGY
Payer: MEDICARE

## 2021-05-20 DIAGNOSIS — G25.81 RESTLESS LEGS SYNDROME (RLS): ICD-10-CM

## 2021-05-21 ENCOUNTER — VIRTUAL VISIT (OUTPATIENT)
Dept: PSYCHOLOGY | Facility: CLINIC | Age: 66
End: 2021-05-21
Payer: MEDICARE

## 2021-05-21 DIAGNOSIS — F41.1 GAD (GENERALIZED ANXIETY DISORDER): ICD-10-CM

## 2021-05-21 DIAGNOSIS — F33.1 MAJOR DEPRESSIVE DISORDER, RECURRENT EPISODE, MODERATE WITH ANXIOUS DISTRESS (H): Primary | ICD-10-CM

## 2021-05-21 PROCEDURE — 90834 PSYTX W PT 45 MINUTES: CPT | Mod: 95 | Performed by: SOCIAL WORKER

## 2021-05-21 NOTE — PROGRESS NOTES
Outcome for 05/21/21 9:58 AM :Patient stated they are not currently checking their glucose     Randi Cleary  is being evaluated via a billable video visit.    How would you like to obtain your AVS? Kellihart     For the video visit, send the invitation by: Jong    Will anyone else be joining your video visit? No

## 2021-05-21 NOTE — PATIENT INSTRUCTIONS
You should be OFF any biotin containing supplements one week prior to endocrine (hormone related)  lab draws.     https://www.fda.gov/medicaldevices/safety/alertsandnotices/zqt565562.htm      You should have thyroid labs checked yearly or sooner prn     We appreciate your assistance in coordinating your healthcare.     Please upload your insulin pump, blood sugar meter and/or continuous glucose monitor at home 1-2 days before your next diabetes-related appointment.   This will allow your provider to review your  data before your scheduled virtual visit.    To ask a question to your Endocrine care team, please send them a [employer] message, or reach them by phone at [phone]     To expedite your medication refill(s), please contact your pharmacy and have them   fax a refill request to: [phone].  *Please allow 3 business days for routine medication refills.  *Please allow 5 business days for controlled substance medication refills.    For after-hours urgent Endocrine issues, that do not require 011, please dial [phone], and ask to speak with the Endocrinologist On-Call

## 2021-05-21 NOTE — PROGRESS NOTES
Progress Note    Patient Name: Randi Cleary  Date: 5/21/21         Service Type: Individual      Session Start Time: 9:00 AM  Session End Time: 9:52 AM     Session Length: 52 minutes    Session #: 41    Attendees: Client attended alone    Service Modality:  Video Visit:    Telemedicine Visit: The patient's condition can be safely assessed and treated via synchronous audio and visual telemedicine encounter.      Reason for Telemedicine Visit: Services only offered telehealth    Originating Site (Patient Location): Patient's home    Distant Site (Provider Location): Provider Remote Setting    Consent:  The patient/guardian has verbally consented to: the potential risks and benefits of telemedicine (video visit) versus in person care; bill my insurance or make self-payment for services provided; and responsibility for payment of non-covered services.     Mode of Communication:  Video Conference via [email or web address]    As the provider I attest to compliance with applicable laws and regulations related to telemedicine.      Treatment Plan Last Reviewed: 3/8/21  PHQ-9 / CHAVO-7 :   PHQ 4/28/2021 5/12/2021 5/21/2021   PHQ-9 Total Score 15 12 7   Q9: Thoughts of better off dead/self-harm past 2 weeks Not at all Not at all Not at all   F/U: Thoughts of suicide or self-harm - - -   F/U: Self harm-plan - - -   F/U: Self-harm action - - -   F/U: Safety concerns - - -     CHAVO-7 SCORE 4/15/2021 5/12/2021 5/21/2021   Total Score 9 (mild anxiety) 9 (mild anxiety) 9 (mild anxiety)   Total Score 9 9 9         DATA  Interactive Complexity: No  Crisis: No       Progress Since Last Session (Related to Symptoms / Goals / Homework):   Symptoms: Improving patient is taking better care of herself and is more hopeful    Homework: Achieved / completed to satisfaction  Focus on eating healthy. -done     Episode of Care Goals: Satisfactory progress - ACTION (Actively working towards change); Intervened by  reinforcing change plan / affirming steps taken     Current / Ongoing Stressors and Concerns:   Patient is currently socially isolated. She has a conflictual relationship with her .  She is getting minimal physical activity.  She had recent eye surgery     Treatment Objective(s) Addressed in This Session:   Patient will increase frequency of engaging her in ADLs.  Patient will track and record at least 5 pleasant exchanges with . Patient will be able to identify at least 5 positive traits about her .  Patient will reduce level of depressive and anxious features as evidenced by reduction in score on her CHAVO-7 and PHQ-9 (scores of 15 and 16 at first measurment, respectively).     Intervention:   CBT: Person-Centered Therapy: Discussed activities patient has been engaging in that have kept her happy, including gardening. Also talked about the hard work she has done lately with movement and the weight she has dropped, talked about the increase in her energy level as a result. She's also had some positive health changes as a result. Talked about all that she has been balancing and how she's been doing it. Discussed bipolar, hypomania, and sebastian. She shared times in her life she may have had sebastian, such as maxing out a new credit card. Talked about what we are watching for right now.       ASSESSMENT: Current Emotional / Mental Status (status of significant symptoms):   Risk status (Self / Other harm or suicidal ideation)   Patient denies current fears or concerns for personal safety.   Patient denies current or recent suicidal ideation or behaviors.   Patient denies current or recent homicidal ideation or behaviors.   Patient denies current or recent self injurious behavior or ideation.   Patient denies other safety concerns.   Patient reports there has been no change in risk factors since their last session.     Patient reports there has been no change in protective factors since their last session.      A safety and risk management plan has been developed including: Patient consented to co-developed safety plan.  Safety and risk management plan was completed.  Patient agreed to use safety plan should any safety concerns arise.  A copy was given to the patient. This was done in a previous session.     Appearance:   Appropriate    Eye Contact:   Fair    Psychomotor Behavior: Normal    Attitude:   Cooperative    Orientation:   All   Speech    Rate / Production: Normal/ Responsive    Volume:  Normal    Mood:    frustrated, yet hopeful   Affect:    Bright    Thought Content:  Clear    Thought Form:  Coherent    Insight:    Fair      Medication Review:   No changes to current psychiatric medication(s)     Medication Compliance:   Yes     Changes in Health Issues:   None reported     Chemical Use Review:   Substance Use: Chemical use reviewed, no active concerns identified      Tobacco Use: No current tobacco use.      Diagnosis:  1. Major depressive disorder, recurrent episode, moderate with anxious distress (H)    2. CHAVO (generalized anxiety disorder)      Collateral Reports Completed:   Not Applicable    PLAN: (Patient Tasks / Therapist Tasks / Other)  Continue getting regular exercise.  Return to working on the house.      MICAELA SLADE    May 21, 2021                                                         ______________________________________________________________________    Treatment Plan    Patient's Name: Randi Cleary  YOB: 1955    Date: 3/8/21    DSM5 Diagnoses: 296.32 (F33.1) Major Depressive Disorder, Recurrent Episode, Moderate With anxious distress  Psychosocial / Contextual Factors: Patient's entire family of origin has , she now has a sister-in-law and  as support.  Relationship with  is conflictual.  Patient is reporting increase in physical symptoms due to COVID-19.  Patient is off of pain medications.  WHODAS: 42    Referral / Collaboration:  Referral to  "another professional/service is not indicated at this time.     Anticipated number of session or this episode of care: 12-15      MeasurableTreatment Goal(s) related to diagnosis / functional impairment(s)  Goal 1: Patient will \"jumpstart, getting going with the things I need to be doing around the house as far as picking up, doing things, trying to do something every day.  Also to lessen the animosity between me and my .\"    I will know I've met my goal when my shoulders are fixed and I can see.      Objective #A (Patient Action)    Patient will increase frequency of engaging her in ADLs.  Status: Continued - Date(s): 3/8/21    Intervention(s)  Therapist will engage patient in CBT, specifically behavioral activation.    Objective #B  Patient will track and record at least 5 pleasant exchanges with . Patient will be able to identify at least 5 positive traits about her  and how he relates to her.  Status: Continued - Date(s): 3/8/21    Intervention(s)  Therapist will teach assertiveness skills and assign homework related to relationship interactions.    Objective #C  Patient will reduce level of depressive and anxious features as evidenced by reduction in score on her CHAVO-7 and PHQ-9 (scores of 15 and 16 at first measurment, respectively).  Status: Continued - Date(s): 3/8/21    Intervention(s)  Therapist will engage patient in person-centered therapy and CBT.    Patient has reviewed and agreed to the above plan.      MICAELA SLADE  March 8, 2021  "

## 2021-05-24 ENCOUNTER — RECORDS - HEALTHEAST (OUTPATIENT)
Dept: ADMINISTRATIVE | Facility: OTHER | Age: 66
End: 2021-05-24

## 2021-05-24 ENCOUNTER — VIRTUAL VISIT (OUTPATIENT)
Dept: ENDOCRINOLOGY | Facility: CLINIC | Age: 66
End: 2021-05-24
Payer: MEDICARE

## 2021-05-24 DIAGNOSIS — E89.6 HISTORY OF PARTIAL ADRENALECTOMY (H): ICD-10-CM

## 2021-05-24 DIAGNOSIS — E89.0 POSTABLATIVE HYPOTHYROIDISM: Primary | ICD-10-CM

## 2021-05-24 DIAGNOSIS — Z86.018 HISTORY OF PHEOCHROMOCYTOMA: ICD-10-CM

## 2021-05-24 PROBLEM — R79.89 ELEVATED PLASMA METANEPHRINES: Status: RESOLVED | Noted: 2020-09-18 | Resolved: 2021-05-24

## 2021-05-24 PROBLEM — E11.9 TYPE 2 DIABETES MELLITUS WITHOUT COMPLICATION, WITHOUT LONG-TERM CURRENT USE OF INSULIN (H): Status: RESOLVED | Noted: 2020-02-14 | Resolved: 2021-05-24

## 2021-05-24 PROCEDURE — 99215 OFFICE O/P EST HI 40 MIN: CPT | Mod: 95

## 2021-05-24 RX ORDER — LEVOTHYROXINE SODIUM 150 MCG
150 TABLET ORAL DAILY
Qty: 90 TABLET | Refills: 4 | Status: SHIPPED | OUTPATIENT
Start: 2021-05-24 | End: 2022-06-03

## 2021-05-24 NOTE — PROGRESS NOTES
Endocrine video visit progress note    Attending Assessment/Plan :     History of left sided pheochromocytoma, 2.8 cm, removed 8/17.  This has been presumed sporadic but it might not be.      History of unilateral adrenalectomy; history of corticosteroid injection 11/16/17, history of prednisone burst to 40 mg/day on 10/16/19.      Sleep apnea , prescribed  CPAP, not using it.        Pre/diabetes by HgbA1c.       Hypothyroidism following radioiodine treatment for  Graves' .  Treat to normal target TSH LT4  . She is on synthroid brand 150 mcg/day .  Normal TFTs 5/2021.  Follow TSH reflex yearly or sooner prn . Continue Synthroid     Addendum:  8/31/22 TSH 1.76  1/18/2023 TSh 1.58, free T4 1.46, T3 114 on Synthroid 150 mcg/day  7/14/23 UAE < 12, HgbA1c 5.8, TSH 3.18  10/17/23 TSH 0.24, HgbA1c 5.7%, glucose 111, creatinine 0.67, Na 142, K 4.1, Ca 9.6,     Obesity.   She is successfully losing weight on her own . Praise / reinforce.      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  I am listing this so I don't forget about it.     Due to the COVID 19 pandemic this visit was a telephone/video visit. The patient gave verbal consent for the visit today.    I have independently reviewed and interpreted labs, imaging as indicated.     Chart review/prep time 1  [id number]  Visit Start time 1256  Visit Stop time 1320  __ minutes spent on the date of the encounter doing chart review, history and exam, documentation and further activities as noted above.    Alee Coker MD    Chief complaint/ HISTORY OF PRESENT ILLNESS Winnie returns for follow up of hypothyroidism following 131I for Graves', history of left pheochromocytoma with unilateral adrenalectomy, prediabetes.  I last saw her 9/2020. .  At that time she was on LT4 175 mcg/day and she was off metformin . On 12/9/2020 I reduced the dose to 150 mc/gday.     She continues on LT4 at 150 mcg/day since 1/22020 .  Today she says she is on Synthorid brand.      She has  history of Graves' treated with 131I x 2 many years ago.  She has been on LT4 since then.     Adrenal mass was lst noted in 2006.  At that time it was 1.5 cm.  On follow up imaging in 2017 it was 2.5 cm.  She underwent laparoscopic left adrenalectomy in 8/2/17 ([facility]) removing pheochromocytoma 2.8 cm.  .    Increased metanephrine noted 9/2020 labs was followed by normal  repeat plasma metanephrine 10/2020 and 24 hour urine metanephrine levels which were normal 11/2020.     We have the following relevant labs  4/19/17 urine cortisol 17 mcg/24 hours (3.5-45); metanephrine 719 mcg/24 hours (normal < 400), normetanephrine 409 mcg/24 hours (< 900), total metanephrine 1128 mcg/24 hours (< 1300), NE 47 mcg/24 hours (15-80), EPI 18 mcg/24 hoiurs (< 21),  ( mcg/24 hours), 24 hour urine volume 2025 12/4/19 f normetanephrine 0.89, metanephrine 0.12  2/20/2020 metanephrine 0.12, normetanephrine 0.8  8/3/2020 Fe 51, transferrin saturation 16, 25OHD 68.2, B12 755  9/9/2020 metanephrine 0.11, normetaenprhine 0.9 ,  pg/ml (700-750 supine) , epi < 25, DA < 25  10/21/2020 labs aldosterone 16, metanephrine 0.11, normetanephrine 0.49, renin activity 1.8  10/30/2020 TSH 0.37, HbbA1c 6.1%, Ca 9.8, Na 140 K 4, ferritin 20  11/2/20202 24 hour urine metanephrine 46 mg/day (), normetanephrine  186 mcg/day (), volume 2700, urine creatinine 837 mg/day  12/4/2020  [facility] TSH 0.06, free t4 1.2, free T3 2.8, reverse T3 29.6 on LT4 175 mcg/day  5/14/2021 TSH 0.87, ferritin 66, Ca 9.1, glucose 127, creatinine 0.69 Hgba1c 5.4.   5/18/2021 TSH 1.31, cholesterol 180, HDL 78, , LDl 83, NTproBNP 66 on LT4 150 mc/gday    Imaging  5/15/1994 15 mCi 131I; prior 123I thyroid uptake scan: 69.1% 24 ilir ruprake; enlarged gland with prominent pyramidal lobe  12/7/1994: 123I thyroid uptake 52%  3/9/17 [facility])  6/16/17 MR abdomen ([facility]): 2.5 cm left adrenal nodule (was 1.5 11/9/06)  6/24/19 CTA  "chest: evidence for pulmonary artery HTN, hepatic steattosis;     Weights  3/9/2020 277  3/4/2021 220  5/18/22021 215     REVIEW OF SYSTEMS  Successfully losing weight by myself - exercising at pool 3-4 times/week; PT; \"not on a diet\"  Eat a lot of salads.  Would like to lose more weight.   Sleep at night is not OK -- interrupted by restless legs; dread the night time  Recent cataract extraction - and I don't have   Cardiac:   Respiratory: not using CPAP-- I now I need it; hard to breath with CPAP on when awake; haven't given up on it.  GI: stolls are a little lose.   Different person - more with it now   Mentally better being off the opioids -- can't remember half of the things from when on Fentanyl.    Had been on pain meds > 20 years related to bad ankle; Off opioids and psych meds   Had carpal tunnel done on 17th; has taken a few hydrocodones for this.  Now has 2 more operations upcoming (bone spur and hammertoe)  Arms are numb and tingly  Has torn rotator cuffs , painful   I don't know how to find a neurologist; PCP is Dr Stockton -   Dr Stockton doesn't believe I can be off the psych meds and I have gone the supplement route.   Foot neuropathy    Past Medical History  Past Medical History:   Diagnosis Date    Anxiety     Bipolar disorder (H)     Breast cancer (H) 1986    lumpectomy, radiation, chemo    COPD (chronic obstructive pulmonary disease) (H)     asthma    Depression, major, recurrent (H)     Drug tolerance     opioid    Esophageal reflux     Fatigue     Graves disease 1994    Hemochromatosis 02/14/2018    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; H63D not detected    Hyperlipidaemia     Hypertension     Impaired fasting glucose 2017    Joint pain     Morbid obesity (H)     KYAW (obstructive sleep apnea) 2016    Osteopenia     Pheochromocytoma, left 08/02/2017    laparoscopically removed    Postablative hypothyroidism 1995    Psoriasis     Psoriatic arthropathy (H)     RLS (restless legs syndrome)     Sacroiliitis (H)     Snoring  "    Spinal stenosis     Vitamin B 12 deficiency 2009    Vitamin D deficiency 2010     Past Surgical History:   Procedure Laterality Date    ARTHRODESIS ANKLE      ARTHROPLASTY ANKLE Right 6/29/2015    Procedure: ARTHROPLASTY ANKLE;  Surgeon: Jason Coughlin MD;  Location: [facility]    ARTHROPLASTY REVISION ANKLE Right 6/29/2015    Procedure: ARTHROPLASTY REVISION ANKLE;  Surgeon: Jason Coughlin MD;  Location: [facility]    BREAST BIOPSY, CORE RT/LT      COLONOSCOPY      COLONOSCOPY N/A 2/25/2021    Procedure: COLONOSCOPY;  Surgeon: Guru Elke Tolbert MD;  Location:  GI    CV CORONARY ANGIOGRAM N/A 10/3/2019    Procedure: CV CORONARY ANGIOGRAM;  Surgeon: Bryce Pierre MD;  Location:  HEART CARDIAC CATH LAB    CV RIGHT HEART CATH MEASUREMENTS RECORDED N/A 10/3/2019    Procedure: CV RIGHT HEART CATH;  Surgeon: Bryce Pierre MD;  Location:  HEART CARDIAC CATH LAB    ESOPHAGOSCOPY, GASTROSCOPY, DUODENOSCOPY (EGD), COMBINED N/A 2/25/2021    Procedure: ESOPHAGOGASTRODUODENOSCOPY, WITH BIOPSY;  Surgeon: Guru Elke Tolbert MD;  Location:  GI    LAPAROSCOPIC ADRENALECTOMY Left 08/02/2017    pheochromocytoma    LENGTHEN TENDON ACHILLES Right 6/29/2015    Procedure: LENGTHEN TENDON ACHILLES;  Surgeon: Jason Coughlin MD;  Location: [facility]    LUMPECTOMY BREAST      MAMMOPLASTY REDUCTION Right 01/13/2015    [place]    MASTECTOMY MODIFIED RADICAL      REPAIR HAMMER TOE Right 6/29/2015    Procedure: REPAIR HAMMER TOE;  Surgeon: Jason Coughlin MD;  Location: [facility]    TONSILLECTOMY & ADENOIDECTOMY       Breast lumpectomy 1986  Breast lumpectomy 1994  Reconstruction on right breast to make more symmmetrical to left     Medications    Current Outpatient Medications   Medication Sig Dispense Refill    albuterol (PROAIR HFA/PROVENTIL HFA/VENTOLIN HFA) 108 (90 Base) MCG/ACT inhaler Inhale 2 puffs into the lungs every 4 hours as needed for shortness of breath / dyspnea  or wheezing 1 Inhaler 11    albuterol (PROVENTIL) (2.5 MG/3ML) 0.083% neb solution Take 1 vial (2.5 mg) by nebulization every 4 hours as needed for shortness of breath / dyspnea or wheezing 360 mL 5    ASPIRIN PO Take 81 mg by mouth 2 times daily       Cholecalciferol (VITAMIN D3) 250 MCG (53743 UT) TABS Take 1 tablet by mouth daily 93813/day      Cyanocobalamin (VITAMIN B-12) 5000 MCG SUBL Unknown dose. 2 or 3 sprays/day      Fluticasone-Umeclidin-Vilanterol (TRELEGY ELLIPTA) 100-62.5-25 MCG/INH oral inhaler Inhale 1 puff into the lungs daily 1 each 5    furosemide (LASIX) 20 MG tablet Take 2 tablets (40 mg) by mouth daily 180 tablet 2    levothyroxine (SYNTHROID/LEVOTHROID) 150 MCG tablet Take 1 tablet (150 mcg) by mouth daily 90 tablet 4    losartan (COZAAR) 25 MG tablet Take 1 tablet (25 mg) by mouth daily Please keep scheduled appointment on 4/27/21. Thank you. 90 tablet 0    Omeprazole (PRILOSEC PO) Take 20 mg by mouth 2 times daily (before meals)      potassium chloride ER (KLOR-CON M) 20 MEQ CR tablet Take 1 tablet (20 mEq) by mouth daily 90 tablet 3    rOPINIRole (REQUIP) 2 MG tablet       albuterol (ACCUNEB) 0.63 MG/3ML neb solution Take 1 ampule by nebulization every 6 hours as needed for shortness of breath / dyspnea or wheezing      diclofenac (VOLTAREN) 1 % topical gel Place onto the skin 4 times daily      ferrous fumarate 65 mg, [place]. FE,-Vitamin C 125 mg (VITRON-C)  MG TABS tablet Take 1 tablet by mouth daily 30 tablet 2     CA -Mg supplements 200-175  Reactive Magneisum 235 mg at night  Vitamin C  B complex - containing biotin 75 mcg  Vitamin E  Alpha base capsures with iron (Ortho molecular products)  with biotin 200 mcg; Mg 200 mg; and others     Allergies  Allergies   Allergen Reactions    Buspirone      The patient states she had serotonin syndrome    Desvenlafaxine      Serotonin syndrome    Diclofenac Sodium [Diclofenac]      Serotonin syndrome and restless legs syndrome    Gabapentin   "    Drove on the wrong side of the highway    Levofloxacin      \"CAN'T REMEMBER\"    Penicillins      \"SORES IN MOUTH\"    Riluzole Difficulty breathing and Swelling    Sulfa Drugs      \"PT DOES NOT KNOW WHAT THE REACTION WAS\"        Social History  Social History     Tobacco Use    Smoking status: Former Smoker     Packs/day: 2.50     Years: 20.00     Pack years: 50.00     Types: Cigarettes     Quit date: 2000     Years since quittin.9    Smokeless tobacco: Never Used   Substance Use Topics    Alcohol use: No     Comment: relapse 2019 sober     Drug use: No      - rearranging the house;  has DM and is also doing better with his DM     Physical Exam  GENERAL :talking, in NAD;   BP Readings from Last 1 Encounters:   21 125/76      Pulse Readings from Last 1 Encounters:   21 82      Resp Readings from Last 1 Encounters:   21 16      Temp Readings from Last 1 Encounters:   21 98.3  F (36.8  C) (Oral)      SpO2 Readings from Last 1 Encounters:   21 94%      Wt Readings from Last 1 Encounters:   21 97.5 kg (215 lb)      Ht Readings from Last 1 Encounters:   21 1.64 m (5' 4.57\")     SKIN: Visible skin clear.  EYES: Eyes grossly normal to inspection.    NecK:   RESP: No audible wheeze, cough, or visible cyanosis.  No visible retractions or increased work of breathing.  coughing dry;  NEURO:  Awake, alert, responds appropriately to questions.  Mentation and speech fluent.  PSYCH:affect normal, judgement and insight intact, and appearance well-groomed.    DATA REVIEW    Results for GEETA BOSS (MRN [id number]) as of 2021 12:58   Ref. Range 2021 10:22   Cholesterol Latest Ref Range: <200 mg/dL 180   HDL Cholesterol Latest Ref Range: >49 mg/dL 78   LDL Cholesterol Calculated Latest Ref Range: <100 mg/dL 83   Non HDL Cholesterol Latest Ref Range: <130 mg/dL 103   N-Terminal Pro Bnp Latest Ref Range: 0 - 125 pg/mL 66   Triglycerides Latest Ref " Range: <150 mg/dL 101   TSH Latest Ref Range: 0.40 - 4.00 mU/L 1.31

## 2021-05-24 NOTE — LETTER
5/24/2021       RE: Randi Cleary  [address] [facility] [address]     Dear Colleague,    Thank you for referring your patient, Randi Cleary, to the [facility] [place] [facility]. Please see a copy of my visit note below.    Outcome for 05/21/21 9:58 AM :Patient stated they are not currently checking their glucose     Randi Cleary  is being evaluated via a billable video visit.    How would you like to obtain your AVS? La     For the video visit, send the invitation by: [employer]    Will anyone else be joining your video visit? No          Endocrine video visit progress note    Attending Assessment/Plan :     History of left sided pheochromocytoma, 2.8 cm, removed 8/17.  This has been presumed sporadic but it might not be.      History of unilateral adrenalectomy; history of corticosteroid injection 11/16/17, history of prednisone burst to 40 mg/day on 10/16/19.      Sleep apnea , prescribed  CPAP, not using it.        Pre/diabetes by HgbA1c.       Hypothyroidism following radioiodine treatment for  Graves' .  Treat to normal target TSH LT4  . She is on synthroid brand 150 mcg/day .  Normal TFTs 5/2021.  Follow TSH reflex yearly or sooner prn . Continue Synthroid     Obesity.   She is successfully losing weight on her own . Praise / reinforce.      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  I am listing this so I don't forget about it.     Due to the COVID 19 pandemic this visit was a telephone/video visit. The patient gave verbal consent for the visit today.    I have independently reviewed and interpreted labs, imaging as indicated.     Chart review/prep time 1  [id number]  Visit Start time 1256  Visit Stop time 1320  __ minutes spent on the date of the encounter doing chart review, history and exam, documentation and further activities as noted above.    Alee Coker MD    Chief  complaint/ HISTORY OF PRESENT ILLNESS Winnie returns for follow up of hypothyroidism following 131I for Graves', history of left pheochromocytoma with unilateral adrenalectomy, prediabetes.  I last saw her 9/2020. .  At that time she was on LT4 175 mcg/day and she was off metformin . On 12/9/2020 I reduced the dose to 150 mc/gday.     She continues on LT4 at 150 mcg/day since 1/22020 .  Today she says she is on Synthorid brand.      She has history of Graves' treated with 131I x 2 many years ago.  She has been on LT4 since then.     Adrenal mass was lst noted in 2006.  At that time it was 1.5 cm.  On follow up imaging in 2017 it was 2.5 cm.  She underwent laparoscopic left adrenalectomy in 8/2/17 ([facility]) removing pheochromocytoma 2.8 cm.  .    Increased metanephrine noted 9/2020 labs was followed by normal  repeat plasma metanephrine 10/2020 and 24 hour urine metanephrine levels which were normal 11/2020.     We have the following relevant labs  4/19/17 urine cortisol 17 mcg/24 hours (3.5-45); metanephrine 719 mcg/24 hours (normal < 400), normetanephrine 409 mcg/24 hours (< 900), total metanephrine 1128 mcg/24 hours (< 1300), NE 47 mcg/24 hours (15-80), EPI 18 mcg/24 hoiurs (< 21),  ( mcg/24 hours), 24 hour urine volume 2025 12/4/19 f normetanephrine 0.89, metanephrine 0.12  2/20/2020 metanephrine 0.12, normetanephrine 0.8  8/3/2020 Fe 51, transferrin saturation 16, 25OHD 68.2, B12 755  9/9/2020 metanephrine 0.11, normetaenprhine 0.9 ,  pg/ml (700-750 supine) , epi < 25, DA < 25  10/21/2020 labs aldosterone 16, metanephrine 0.11, normetanephrine 0.49, renin activity 1.8  10/30/2020 TSH 0.37, HbbA1c 6.1%, Ca 9.8, Na 140 K 4, ferritin 20  11/2/20202 24 hour urine metanephrine 46 mg/day (), normetanephrine  186 mcg/day (), volume 2700, urine creatinine 837 mg/day  12/4/2020  [facility] TSH 0.06, free t4 1.2, free T3 2.8, reverse T3 29.6 on LT4 175 mcg/day  5/14/2021 TSH 0.87, ferritin  "66, Ca 9.1, glucose 127, creatinine 0.69 Hgba1c 5.4.   5/18/2021 TSH 1.31, cholesterol 180, HDL 78, , LDl 83, NTproBNP 66 on LT4 150 mc/gday    Imaging  5/15/1994 15 mCi 131I; prior 123I thyroid uptake scan: 69.1% 24 ilir ruprake; enlarged gland with prominent pyramidal lobe  12/7/1994: 123I thyroid uptake 52%  3/9/17 US thyroid ([facility])  6/16/17 MR abdomen ([facility]): 2.5 cm left adrenal nodule (was 1.5 11/9/06)  6/24/19 CTA chest: evidence for pulmonary artery HTN, hepatic steattosis;     Weights  3/9/2020 277  3/4/2021 220  5/18/22021 215     REVIEW OF SYSTEMS  Successfully losing weight by myself - exercising at pool 3-4 times/week; PT; \"not on a diet\"  Eat a lot of salads.  Would like to lose more weight.   Sleep at night is not OK -- interrupted by restless legs; dread the night time  Recent cataract extraction - and I don't have   Cardiac:   Respiratory: not using CPAP-- I now I need it; hard to breath with CPAP on when awake; haven't given up on it.  GI: stolls are a little lose.   Different person - more with it now   Mentally better being off the opioids -- can't remember half of the things from when on Fentanyl.    Had been on pain meds > 20 years related to bad ankle; Off opioids and psych meds   Had carpal tunnel done on 17th; has taken a few hydrocodones for this.  Now has 2 more operations upcoming (bone spur and hammertoe)  Arms are numb and tingly  Has torn rotator cuffs , painful   I don't know how to find a neurologist; PCP is Dr Stockton -   Dr Stockton doesn't believe I can be off the psych meds and I have gone the supplement route.   Foot neuropathy    Past Medical History  Past Medical History:   Diagnosis Date     Anxiety      Bipolar disorder (H)      Breast cancer (H) 1986    lumpectomy, radiation, chemo     COPD (chronic obstructive pulmonary disease) (H)     asthma     Depression, major, recurrent (H)      Drug tolerance     opioid     Esophageal reflux      Fatigue      Graves " disease 1994     Hemochromatosis 02/14/2018    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; H63D not detected     Hyperlipidaemia      Hypertension      Impaired fasting glucose 2017     Joint pain      Morbid obesity (H)      KYAW (obstructive sleep apnea) 2016     Osteopenia      Pheochromocytoma, left 08/02/2017    laparoscopically removed     Postablative hypothyroidism 1995     Psoriasis      Psoriatic arthropathy (H)      RLS (restless legs syndrome)      Sacroiliitis (H)      Snoring      Spinal stenosis      Vitamin B 12 deficiency 2009     Vitamin D deficiency 2010     Past Surgical History:   Procedure Laterality Date     ARTHRODESIS ANKLE       ARTHROPLASTY ANKLE Right 6/29/2015    Procedure: ARTHROPLASTY ANKLE;  Surgeon: Jason Coughlin MD;  Location: [facility]     ARTHROPLASTY REVISION ANKLE Right 6/29/2015    Procedure: ARTHROPLASTY REVISION ANKLE;  Surgeon: Jason Coughlin MD;  Location: [facility]     BREAST BIOPSY, CORE RT/LT       COLONOSCOPY       COLONOSCOPY N/A 2/25/2021    Procedure: COLONOSCOPY;  Surgeon: Guru Elke Tolbert MD;  Location:  GI     CV CORONARY ANGIOGRAM N/A 10/3/2019    Procedure: CV CORONARY ANGIOGRAM;  Surgeon: Bryce Pierre MD;  Location:  HEART CARDIAC CATH LAB     CV RIGHT HEART CATH MEASUREMENTS RECORDED N/A 10/3/2019    Procedure: CV RIGHT HEART CATH;  Surgeon: Bryce Pierre MD;  Location:  HEART CARDIAC CATH LAB     ESOPHAGOSCOPY, GASTROSCOPY, DUODENOSCOPY (EGD), COMBINED N/A 2/25/2021    Procedure: ESOPHAGOGASTRODUODENOSCOPY, WITH BIOPSY;  Surgeon: Guru Elke Tolbert MD;  [facility]     LAPAROSCOPIC ADRENALECTOMY Left 08/02/2017    pheochromocytoma     LENGTHEN TENDON ACHILLES Right 6/29/2015    Procedure: LENGTHEN TENDON ACHILLES;  Surgeon: Jason Coughlin MD;  Location: [facility]     LUMPECTOMY BREAST       MAMMOPLASTY REDUCTION Right 01/13/2015    [place]     MASTECTOMY MODIFIED RADICAL       REPAIR HAMMER TOE Right 6/29/2015     Procedure: REPAIR HAMMER TOE;  Surgeon: Jason Coughlin MD;  Location: [facility]     TONSILLECTOMY & ADENOIDECTOMY       Breast lumpectomy 1986  Breast lumpectomy 1994  Reconstruction on right breast to make more symmmetrical to left     Medications    Current Outpatient Medications   Medication Sig Dispense Refill     albuterol (PROAIR HFA/PROVENTIL HFA/VENTOLIN HFA) 108 (90 Base) MCG/ACT inhaler Inhale 2 puffs into the lungs every 4 hours as needed for shortness of breath / dyspnea or wheezing 1 Inhaler 11     albuterol (PROVENTIL) (2.5 MG/3ML) 0.083% neb solution Take 1 vial (2.5 mg) by nebulization every 4 hours as needed for shortness of breath / dyspnea or wheezing 360 mL 5     ASPIRIN PO Take 81 mg by mouth 2 times daily        Cholecalciferol (VITAMIN D3) 250 MCG (95413 UT) TABS Take 1 tablet by mouth daily 29011/day       Cyanocobalamin (VITAMIN B-12) 5000 MCG SUBL Unknown dose. 2 or 3 sprays/day       Fluticasone-Umeclidin-Vilanterol (TRELEGY ELLIPTA) 100-62.5-25 MCG/INH oral inhaler Inhale 1 puff into the lungs daily 1 each 5     furosemide (LASIX) 20 MG tablet Take 2 tablets (40 mg) by mouth daily 180 tablet 2     levothyroxine (SYNTHROID/LEVOTHROID) 150 MCG tablet Take 1 tablet (150 mcg) by mouth daily 90 tablet 4     losartan (COZAAR) 25 MG tablet Take 1 tablet (25 mg) by mouth daily Please keep scheduled appointment on 4/27/21. Thank you. 90 tablet 0     Omeprazole (PRILOSEC PO) Take 20 mg by mouth 2 times daily (before meals)       potassium chloride ER (KLOR-CON M) 20 MEQ CR tablet Take 1 tablet (20 mEq) by mouth daily 90 tablet 3     rOPINIRole (REQUIP) 2 MG tablet        albuterol (ACCUNEB) 0.63 MG/3ML neb solution Take 1 ampule by nebulization every 6 hours as needed for shortness of breath / dyspnea or wheezing       diclofenac (VOLTAREN) 1 % topical gel Place onto the skin 4 times daily       ferrous fumarate 65 mg, [place]. FE,-Vitamin C 125 mg (VITRON-C)  MG TABS tablet Take 1 tablet by  "mouth daily 30 tablet 2     CA -Mg supplements 200-175  Reactive Magneisum 235 mg at night  Vitamin C  B complex - containing biotin 75 mcg  Vitamin E  Alpha base capsures with iron (Ortho molecular products)  with biotin 200 mcg; Mg 200 mg; and others     Allergies  Allergies   Allergen Reactions     Buspirone      The patient states she had serotonin syndrome     Desvenlafaxine      Serotonin syndrome     Diclofenac Sodium [Diclofenac]      Serotonin syndrome and restless legs syndrome     Gabapentin      Drove on the wrong side of the highway     Levofloxacin      \"CAN'T REMEMBER\"     Penicillins      \"SORES IN MOUTH\"     Riluzole Difficulty breathing and Swelling     Sulfa Drugs      \"PT DOES NOT KNOW WHAT THE REACTION WAS\"        Social History  Social History     Tobacco Use     Smoking status: Former Smoker     Packs/day: 2.50     Years: 20.00     Pack years: 50.00     Types: Cigarettes     Quit date: 2000     Years since quittin.9     Smokeless tobacco: Never Used   Substance Use Topics     Alcohol use: No     Comment: relapse 2019 sober      Drug use: No      - rearranging the house;  has DM and is also doing better with his DM     Physical Exam  GENERAL :talking, in NAD;   BP Readings from Last 1 Encounters:   21 125/76      Pulse Readings from Last 1 Encounters:   21 82      Resp Readings from Last 1 Encounters:   21 16      Temp Readings from Last 1 Encounters:   21 98.3  F (36.8  C) (Oral)      SpO2 Readings from Last 1 Encounters:   21 94%      Wt Readings from Last 1 Encounters:   21 97.5 kg (215 lb)      Ht Readings from Last 1 Encounters:   21 1.64 m (5' 4.57\")     SKIN: Visible skin clear.  EYES: Eyes grossly normal to inspection.    NecK:   RESP: No audible wheeze, cough, or visible cyanosis.  No visible retractions or increased work of breathing.  coughing dry;  NEURO:  Awake, alert, responds appropriately to questions.  " Mentation and speech fluent.  PSYCH:affect normal, judgement and insight intact, and appearance well-groomed.    DATA REVIEW    Results for GEETA BOSS (MRN [id number]) as of 5/24/2021 12:58   Ref. Range 5/18/2021 10:22   Cholesterol Latest Ref Range: <200 mg/dL 180   HDL Cholesterol Latest Ref Range: >49 mg/dL 78   LDL Cholesterol Calculated Latest Ref Range: <100 mg/dL 83   Non HDL Cholesterol Latest Ref Range: <130 mg/dL 103   N-Terminal Pro Bnp Latest Ref Range: 0 - 125 pg/mL 66   Triglycerides Latest Ref Range: <150 mg/dL 101   TSH Latest Ref Range: 0.40 - 4.00 mU/L 1.31

## 2021-05-25 ENCOUNTER — VIRTUAL VISIT (OUTPATIENT)
Dept: PSYCHOLOGY | Facility: CLINIC | Age: 66
End: 2021-05-25
Payer: MEDICARE

## 2021-05-25 DIAGNOSIS — F33.1 MAJOR DEPRESSIVE DISORDER, RECURRENT EPISODE, MODERATE WITH ANXIOUS DISTRESS (H): Primary | ICD-10-CM

## 2021-05-25 DIAGNOSIS — F41.1 GAD (GENERALIZED ANXIETY DISORDER): ICD-10-CM

## 2021-05-25 DIAGNOSIS — R79.0 LOW FERRITIN: ICD-10-CM

## 2021-05-25 DIAGNOSIS — E83.119 HEMOCHROMATOSIS, UNSPECIFIED HEMOCHROMATOSIS TYPE: Primary | ICD-10-CM

## 2021-05-25 PROCEDURE — 90834 PSYTX W PT 45 MINUTES: CPT | Mod: 95 | Performed by: SOCIAL WORKER

## 2021-05-25 NOTE — PROGRESS NOTES
Progress Note    Patient Name: Randi Cleary  Date: 5/25/21         Service Type: Individual      Session Start Time: 2:00 PM  Session End Time: 2:45 PM     Session Length: 52 minutes    Session #: 42    Attendees: Client attended alone    Service Modality:  Video Visit:    Telemedicine Visit: The patient's condition can be safely assessed and treated via synchronous audio and visual telemedicine encounter.      Reason for Telemedicine Visit: Services only offered telehealth    Originating Site (Patient Location): Patient's home    Distant Site (Provider Location): Provider Remote Setting    Consent:  The patient/guardian has verbally consented to: the potential risks and benefits of telemedicine (video visit) versus in person care; bill my insurance or make self-payment for services provided; and responsibility for payment of non-covered services.     Mode of Communication:  Video Conference via [facility]    As the provider I attest to compliance with applicable laws and regulations related to telemedicine.      Treatment Plan Last Reviewed: 3/8/21  PHQ-9 / CHAVO-7 :   PHQ 4/28/2021 5/12/2021 5/21/2021   PHQ-9 Total Score 15 12 7   Q9: Thoughts of better off dead/self-harm past 2 weeks Not at all Not at all Not at all   F/U: Thoughts of suicide or self-harm - - -   F/U: Self harm-plan - - -   F/U: Self-harm action - - -   F/U: Safety concerns - - -     CHAVO-7 SCORE 4/15/2021 5/12/2021 5/21/2021   Total Score 9 (mild anxiety) 9 (mild anxiety) 9 (mild anxiety)   Total Score 9 9 9         DATA  Interactive Complexity: No  Crisis: No       Progress Since Last Session (Related to Symptoms / Goals / Homework):   Symptoms: Patient identifies feeling discombobulated     Homework: Did not complete  Continue getting regular exercise.  Return to working on the house.     Episode of Care Goals: Satisfactory progress - ACTION (Actively working towards change); Intervened by reinforcing  May 17, 2021       Danyell Ferraro MD  [address] [facility] [address]  Via Fax: [phone]      Patient: Kris Garvey   YOB: 1999   Date of Visit: 5/17/2021       Dear Dr. Ferraro:    I saw your patient, Kris Garvey, for an evaluation. Below are my notes for this visit with him.    If you have questions, please do not hesitate to call me.      Sincerely,        Rasheed Collins MD        CC: No Recipients  Rasheed Collins MD  5/17/2021 12:01 PM  Signed  Subjective   Patient ID: Kris is a 22 year old male.    Chief Complaint   Patient presents with   • Sore Throat     cough, swollen tonsils, congestion, headache x 2 days     And has had recurrent issues with strep and has had a sore throat for several days.  No fever.  This feels like similar episode to strep.  Has had some previous posterior lymph nodes.  No history of mono.  Denies any Covid issues or exposure.  No difficulty swallowing.  No other systemic complaints        MEDICATIONS:  Current Outpatient Medications   Medication Sig   • amoxicillin (AMOXIL) 500 MG capsule Take 1 capsule by mouth 3 times daily.   • dextromethorphan (BENYLIN) 15 MG/5ML syrup Take 10 mLs by mouth 4 times daily as needed for Cough.     No current facility-administered medications for this visit.         ALLERGIES:  ALLERGIES:  No Known Allergies      MEDICAL HISTORY:  Past Medical History:   Diagnosis Date   • No known problems          PAST SURGICAL HISTORY:  Past Surgical History:   Procedure Laterality Date   • No past surgeries           FAMILY HISTORY:  Family History   Problem Relation Age of Onset   • Multiple Sclerosis Mother    • Patient is unaware of any medical problems Father        FH Reviewed and negative for any heart or lung diseased, bleeding disorders, or issues related to this complaint.    SOCIAL HISTORY:  Social History     Tobacco Use   • Smoking status: Current Some Day Smoker     Types: Cigarettes   • Smokeless tobacco: Never Used      Substance Use Topics   • Alcohol use: Yes   • Drug use: Not Currently         Patient's medications, allergies, past medical, surgical, and social history  were reviewed and updated as appropriate.    Review of Systems   Constitutional: Negative.    HENT: Negative.    Eyes: Negative.    Respiratory: Negative.    Cardiovascular: Negative.    Gastrointestinal: Negative.    Genitourinary: Negative.    Musculoskeletal: Negative.    Skin: Negative.    Neurological: Negative.    Psychiatric/Behavioral: Negative.        All other systems reviewed and otherwise negative unless noted.     Objective     Physical Exam  Vitals and nursing note reviewed.   Constitutional:       Appearance: Normal appearance.   HENT:      Head: Normocephalic.      Nose: Nose normal.      Mouth/Throat:      Mouth: Mucous membranes are moist.      Pharynx: Oropharyngeal exudate and posterior oropharyngeal erythema present.      Comments: Tonsils are enlarged.  Has some prominent anterior cervical adenopathy also prominent posterior cervical adenopathy bilaterally but the patient states that that is usual for him.  Eyes:      Extraocular Movements: Extraocular movements intact.   Cardiovascular:      Rate and Rhythm: Normal rate.      Pulses: Normal pulses.   Pulmonary:      Effort: Pulmonary effort is normal.   Abdominal:      General: Abdomen is flat.   Musculoskeletal:         General: Normal range of motion.      Cervical back: Normal range of motion. No rigidity.   Lymphadenopathy:      Cervical: Cervical adenopathy present.   Skin:     Capillary Refill: Capillary refill takes less than 2 seconds.   Neurological:      General: No focal deficit present.      Mental Status: He is alert.   Psychiatric:         Mood and Affect: Mood normal.         Visit Vitals  /75   Pulse (!) 54   Temp 98.8 °F (37.1 °C) (Temporal)   Resp 16   Ht 6' (1.829 m)   Wt 81.6 kg (180 lb)   SpO2 99%   BMI 24.41 kg/m²       No results found for this visit on  change plan / affirming steps taken     Current / Ongoing Stressors and Concerns:   Patient is currently socially isolated. She has a conflictual relationship with her .  She is getting minimal physical activity.  She had recent eye surgery     Treatment Objective(s) Addressed in This Session:   Patient will increase frequency of engaging her in ADLs.  Patient will track and record at least 5 pleasant exchanges with . Patient will be able to identify at least 5 positive traits about her .  Patient will reduce level of depressive and anxious features as evidenced by reduction in score on her CHAVO-7 and PHQ-9 (scores of 15 and 16 at first measurment, respectively).     Intervention:   CBT: Person-Centered Therapy: Patient is frustrated as she is not sure the surgery on the carpal tunnel helped and in fact may have made it worse. Talked about her difficulty with not wanting to take the pain medications and stopping doing this two days prior. Discussed past, then talked about finding hope to move forward.     ASSESSMENT: Current Emotional / Mental Status (status of significant symptoms):   Risk status (Self / Other harm or suicidal ideation)   Patient denies current fears or concerns for personal safety.   Patient denies current or recent suicidal ideation or behaviors.   Patient denies current or recent homicidal ideation or behaviors.   Patient denies current or recent self injurious behavior or ideation.   Patient denies other safety concerns.   Patient reports there has been no change in risk factors since their last session.     Patient reports there has been no change in protective factors since their last session.     A safety and risk management plan has been developed including: Patient consented to co-developed safety plan.  Safety and risk management plan was completed.  Patient agreed to use safety plan should any safety concerns arise.  A copy was given to the patient. This was done on  "2020 and is attached to the bottom of the note.     Appearance:   Appropriate    Eye Contact:   Fair    Psychomotor Behavior: Normal    Attitude:   Cooperative    Orientation:   All   Speech    Rate / Production: Normal/ Responsive    Volume:  Normal    Mood:    frustrated, yet hopeful   Affect:    Bright    Thought Content:  Clear    Thought Form:  Coherent    Insight:    Fair      Medication Review:   No changes to current psychiatric medication(s)     Medication Compliance:   Yes     Changes in Health Issues:   None reported     Chemical Use Review:   Substance Use: Chemical use reviewed, no active concerns identified      Tobacco Use: No current tobacco use.      Diagnosis:  1. Major depressive disorder, recurrent episode, moderate with anxious distress (H)    2. CHAVO (generalized anxiety disorder)      Collateral Reports Completed:   Not Applicable    PLAN: (Patient Tasks / Therapist Tasks / Other)  Work on finding hope, remembering all the positive steps you have taken.    MICAELA SLADE    May 25, 2021                                                         ______________________________________________________________________    Treatment Plan    Patient's Name: Randi Cleary  YOB: 1955    Date: 3/8/21    DSM5 Diagnoses: 296.32 (F33.1) Major Depressive Disorder, Recurrent Episode, Moderate With anxious distress  Psychosocial / Contextual Factors: Patient's entire family of origin has , she now has a sister-in-law and  as support.  Relationship with  is conflictual.  Patient is reporting increase in physical symptoms due to COVID-19.  Patient is off of pain medications.  WHODAS: 42    Referral / Collaboration:  Referral to another professional/service is not indicated at this time.     Anticipated number of session or this episode of care: 12-15      MeasurableTreatment Goal(s) related to diagnosis / functional impairment(s)  Goal 1: Patient will \"jumpstart, " 05/17/21.    [unfilled]    Assessment     Problem List Items Addressed This Visit     None      Visit Diagnoses     Pharyngitis, unspecified etiology    -  Primary    Relevant Medications    amoxicillin (AMOXIL) 500 MG capsule          This is a 22 year old year-old male who presents with pharyngitis.  Has had issues with strep before.  Will initiate some treatment with some amoxicillin.  Also advise regarding symptomatic issues.  Will follow up with primary care..  Follow-up Information    None       (J02.9) Pharyngitis, unspecified etiology  (primary encounter diagnosis)  Plan: amoxicillin (AMOXIL) 500 MG capsule    New Prescriptions    AMOXICILLIN (AMOXIL) 500 MG CAPSULE    Take 1 capsule by mouth 3 times daily.     Patient Instructions   Get and start the amoxicillin.  Ibuprofen 600 mg +2 extra strength Tylenol for pain.  Keep up your fluid intake.  See your primary care if not improving.  May also want to see him regarding the lymph nodes in the back of your neck if those have been a longstanding problem.        Pharyngitis     BASIC INFORMATION  Description  Inflammation or infection of the pharynx. The pharynx is the hollow passage at the back of the throat. It is made up of the nasopharynx, which leads to the nose and oropharynx which leads to the mouth. The larynx (voice box) is located below the pharynx. Pharyngitis occurs in all age groups, but most often affects children.    Frequent Signs and Symptoms  · Sore throat  · Swallowing difficulty  · Tickle or \"lump\" in the throat.  · Fever  · Swollen glands in the neck (sometimes)  · Throat maybe red or covered with a white or grayish membrane (sometimes)  · Body aches (sometimes)    Causes  Viral infection (most common cause) or bacterial infection (such as streptococcus). The germs are spread by person-to-person contact. Rarely, there may be other causes, such as irritation.    Risk increases with  · Common cold, flu, or seasonal allergies.  · Weak  "getting going with the things I need to be doing around the house as far as picking up, doing things, trying to do something every day.  Also to lessen the animosity between me and my .\"    I will know I've met my goal when my shoulders are fixed and I can see.      Objective #A (Patient Action)    Patient will increase frequency of engaging her in ADLs.  Status: Continued - Date(s): 3/8/21    Intervention(s)  Therapist will engage patient in CBT, specifically behavioral activation.    Objective #B  Patient will track and record at least 5 pleasant exchanges with . Patient will be able to identify at least 5 positive traits about her  and how he relates to her.  Status: Continued - Date(s): 3/8/21    Intervention(s)  Therapist will teach assertiveness skills and assign homework related to relationship interactions.    Objective #C  Patient will reduce level of depressive and anxious features as evidenced by reduction in score on her CHAVO-7 and PHQ-9 (scores of 15 and 16 at first measurment, respectively).  Status: Continued - Date(s): 3/8/21    Intervention(s)  Therapist will engage patient in person-centered therapy and CBT.    Patient has reviewed and agreed to the above plan.      MICAELA SLADE  March 8, 2021                                                Randi Cleary          SAFETY PLAN:  Step 1: Warning signs / cues (Thoughts, images, mood, situation, behavior) that a crisis may be developing:  ? Thoughts: \"I don't want to continue\" \"I am unwanted\"  ? Images: none  ? Thinking Processes: ruminating  ? Mood: anger  ? Behaviors: isolating/withdrawing , can't stop crying, not taking care of myself and not taking care of my responsibilities  ? Situations: small triggers, such as not being able to find something, or dropping something   Step 2: Coping strategies - Things I can do to take my mind off of my problems without contacting another person (relaxation technique, physical " immune system due to illness or drugs.  · Smoking or second-hand smoke.  · Chronic illness, such as diabetes.  · Close quarters, such as with  recruits, in schools, and  centers.    Preventive Measures  · Avoid close contact with anyone with a sore throat.  · Avoid germs. Wash hands often, especially children.    Expected Outcomes  Most cases of viral infection clear up on their own in a week. Antibiotic drugs can successfully treat bacterial infections. Complications are rare.    Possible Complications  · Airways may become blocked.  · Abscess (pus-filled area of infection)   · Rheumatic fever, scarlet fever, or glomerulonephritis, if pharyngitis is caused by streptococcal bacteria and does not receive adequate antibiotic treatment.     DIAGNOSIS & TREATMENT  General Measures  · Your health care provider will do an exam of the throat, ears, nose, neck, and lungs. Medical tests may include blood study and throat culture (from a swab of the throat), or rapid strep test find the type of infection.   · Treatment will include self-care measures and antibiotic drugs for bacterial infections. Antibiotics will not help viral infections.  · To relieve the sore throat, gargle frequently with warm or cold double-strength tea or warm salt water (mix on-half teaspoon of salt in one cup of water).   · Wash hands often to help prevent the spread of germs to other family members. Avoid kissing or sharing cups or other utensils.     Medications  · For minor discomfort, you may use nonprescription drugs such as ibuprofen. Don't give aspirin to a child.   · Nonprescription throat lozenges (for patients over age 3) may help ease discomfort.  · Antibiotic drugs are usually prescribed for bacterial infection. Finish entire course of drugs even if symptoms improve.    Activity  Return to normal activities as symptoms improve. A person can no longer spread the germs if they have taken antibiotic drug for at least 24  "activity):  ? Distress Tolerance Strategies:  arts and crafts: drawing, play with my pet , listen to positive and upbeat music: any, change body temperature (ice pack/cold water)  and paced breathing/progressive muscle relaxation  ? Physical Activities: go for a walk, deep breathing and stretching   ? Focus on helpful thoughts:  \"You've been through this before, you can get through it again.\"  Step 3: People and social settings that provide distraction:                 Name: Carmen                            Name: Darien                           Name: Aleida       ? pool, shopping, Carmen's house, [employer]       Step 4: Remind myself of people and things that are important to me and worth living for:  Sidney, Aleida Horton, post-COVID world, options of what could be in your future        Step 5: When I am in crisis, I can ask these people to help me use my safety plan:                 Name: Sidney  Step 6: Making the environment safe:   ? go to sleep/daydream  Step 7: Professionals or agencies I can contact during a crisis:  ? [facility] Daytime Number: [phone]  ? Suicide Prevention Lifeline: 5-862-604-KTTT (6596)  ? Crisis Text Line Service (available 24 hours a day, 7 days a week): Text [place] to 042577    Local Crisis Services: [facility] Crisis: [phone]     Call 911 or go to my nearest emergency department.       I helped develop this safety plan and agree to use it when needed.  I have been given a copy of this plan.       Client signature _________________________________________________________________  Today s date:  11/24/2020  Adapted from Safety Plan Template 2008 Randi Poole and Robby Barba is reprinted with the express permission of the authors.  No portion of the Safety Plan Template may be reproduced without the express, written permission.  You can contact the authors at bhs@[email or web address] or [email or web address].  " hours.    Diet  Drink plenty of fluids. If swallowing solid food is painful, try a liquid or soft diet for a few days.    NOTIFY OUR OFFICE IF  · You or a family member has symptoms of pharyngitis  · The following occur during treatment  · Breathing/swallowing difficulty or chest pain.  · Fever worsens or severe headache develops.  · Thick mucous drainage from the nose.  · Cough that produces colored or bloody sputum.  · Skin rash.  · Dark urine.     Copyright © 2005 by [employer]. All rights reserved    Instructions provided as documented in the AVS.    Thank you for visiting [facility].

## 2021-05-27 ENCOUNTER — TRANSFERRED RECORDS (OUTPATIENT)
Dept: NEUROSURGERY | Facility: CLINIC | Age: 66
End: 2021-05-27

## 2021-05-27 ENCOUNTER — RECORDS - HEALTHEAST (OUTPATIENT)
Dept: ADMINISTRATIVE | Facility: OTHER | Age: 66
End: 2021-05-27

## 2021-05-27 ENCOUNTER — MEDICAL CORRESPONDENCE (OUTPATIENT)
Dept: NEUROSURGERY | Facility: CLINIC | Age: 66
End: 2021-05-27

## 2021-05-27 VITALS — BODY MASS INDEX: 34.7 KG/M2 | HEIGHT: 66 IN

## 2021-05-27 VITALS — DIASTOLIC BLOOD PRESSURE: 72 MMHG | SYSTOLIC BLOOD PRESSURE: 124 MMHG

## 2021-05-27 ASSESSMENT — PATIENT HEALTH QUESTIONNAIRE - PHQ9: SUM OF ALL RESPONSES TO PHQ QUESTIONS 1-9: 18

## 2021-05-27 NOTE — TELEPHONE ENCOUNTER
Spoke with pt and relayed PCP message.  Pt understanding and very thankful.  Pt states her new surgery date is 5/8/19.

## 2021-05-27 NOTE — TELEPHONE ENCOUNTER
I spoke to Winnie today to help reschedule her pre-op visit from 4/16. She said she was going to call anyway and try to get in earlier as she has been experiencing foot and ankle swelling since starting a fentanyl patch. We rescheduled her appointment for 4/9/19 and I asked if she wanted to come in any sooner or have me send a message to Dr. Stockton but she declined.

## 2021-05-27 NOTE — PROGRESS NOTES
Patient is here for a follow up with Anne-Marie Little CNP for her shoulder and neck pain          *Universal Precautions:    UDT/SWAB- 04/14/2018  Consent/Agreement- 06/15/2018  Pharmacy- as documented    Count- n/a   Psychological evaluation last OV 02/06/2019 with Mabel Merida  6/15/18 MME=50  02/15/19 MME= 45  03/19/19 MME- 50  04/04/19 MME-50  Pharmacogenetic testing- n/a  MTM: n/a  Naloxone safety: 10/16/2018

## 2021-05-27 NOTE — PROGRESS NOTES
Preoperative Exam    Scheduled Procedure: Left rotator cuff   Surgery Date:  4/19/19  Surgery Location: [facility], fax [phone]    Surgeon:  Dr Scott     Assessment/Plan:       1. Preop exam for internal medicine  Winnie is medically stable with re-guard to her complex medical history for the above surgical procedure.  She is currently free from symptoms of infectious disease nature.  She has no history of primary coronary artery disease, arrhythmia, type 2 diabetes.  However, she does have COPD, morbid obesity and is quite sedentary with regard to physical activity.  She has no history of allergy to anesthesia but has had postoperative hypoxemia and respiratory insufficiency.  Of note, she is wearing a fentanyl patch for chronic pain syndrome.  She has had recent extensive dental surgery.  This delayed her last procedure.     Medications are reviewed.  She is currently on no aspirin or NSAIDs.  She will take her levothyroxine on the a.m. of surgery.  She will skip her vitamin supplements.  Her  will provide aftercare.     - Electrocardiogram Perform and Read  - HM2(CBC w/o Differential)  - Basic Metabolic Panel  - XR Chest 2 Views      - HM2(CBC w/o Differential)  - Basic Metabolic Panel    2. Complete tear of left rotator cuff  As above, she is looking forward to definitive treatment    3. Acute postoperative respiratory insufficiency  History of postoperative respiratory insufficiency.  Underlying history of morbid obesity and obstructive sleep apnea-patient chooses not to own a CPAP device    4. Chronic obstructive pulmonary disease, unspecified COPD type (H)  Patient will begin using nebulizers on a more regular basis 1 week prior to her surgical procedure.  She is instructed to use her Symbicort and Ventolin prior to arriving at the hospital.    5. Sleep apnea, obstructive  She currently does not wear CPAP device.    6. Chronic, continuous use of opioids  She is currently wearing a fentanyl  patch    7. Hereditary hemochromatosis (H)  Stable    8. Psoriasis  Numerous psoriatic skin lesions.  No current cellulitis    9. Gastroesophageal reflux disease without esophagitis  Stable    10. Postablative hypothyroidism  Stable    Surgical Procedure Risk: Intermediate (reported cardiac risk generally 1-5%)  Have you had prior anesthesia?: Yes  Have you or any family members had a previous anesthesia reaction:  No  Do you or any family members have a history of a clotting or bleeding disorder?: No  Cardiac Risk Assessment: no increased risk for major cardiac complications    Patient approved for surgery with general or local anesthesia.    Please Note:  Patient is taking medications for Chronic Pain.   Her history is significant for obstructive sleep apnea and COPD    Functional Status: Independent  Patient plans to recover at home with family.     Subjective:      Randi Damon is a 63 y.o. female who presents for a preoperative consultation.  Of note, Randi is a patient who has a complex past medical history.  Of note, she is here today for preoperative consultation prior to having surgery for rotator cuff repair and shoulder repair.  The details are outlined above.  Of note, she is looking forward to having improvement of her pain as it is quite debilitating for her.  Of note, she had this surgery scheduled in February.  It had to be rescheduled due to new and underlying dental problems.  She does not get routine dental care as she is on medical assistance.     Her history is complex.  She has a past medical history of hemochromatosis and psoriasis with likely psoriatic arthritis.  She has a chronic pain syndrome.  She is currently being managed at the pain clinic for chronic pain secondary to arthritis, severe foot deformity, and spinal stenosis.  Additionally, she has psychiatric disease with anxiety and depression.  She has recently had a minor exacerbation of her chronic depression.     Pertinent  anesthesia history is reviewed.  She denies any allergic reactions to general or local anesthetic agents.  However, she describes having respiratory complications from a recent anesthesia.  She denies any personal or family history of blood clots or DVTs.  There is no history of bleeding disorder.  She is currently a non-smoker.  She does not have type 2 diabetes.  She does not have primary coronary artery disease or arrhythmia.  She does not have chronic kidney disease.  However, she does have chronic lung disease.  She is obese and sedentary        All other systems reviewed and are negative, other than those listed in the HPI.    Pertinent History  Do you have difficulty breathing or chest pain after walking up a flight of stairs: Yes: Patient has underlying COPD and morbid obesity  History of obstructive sleep apnea: Yes: Yes, but does not have a CPAP machine  Steroid use in the last 6 months: Yes: Occasional pulsed steroids for COPD exacerbation  Frequent Aspirin/NSAID use: Yes: On hold  Prior Blood Transfusion: No  Prior Blood Transfusion Reaction: No  If for some reason prior to, during or after the procedure, if it is medically indicated, would you be willing to have a blood transfusion?:  There is no transfusion refusal.    Current Outpatient Medications   Medication Sig Dispense Refill     albuterol (PROAIR HFA;PROVENTIL HFA;VENTOLIN HFA) 90 mcg/actuation inhaler Inhale 2 puffs every 6 (six) hours as needed for wheezing.       albuterol (PROVENTIL) 2.5 mg /3 mL (0.083 %) nebulizer solution Take 3 mL (2.5 mg total) by nebulization every 6 (six) hours as needed for wheezing. 75 mL 12     aspirin 325 MG EC tablet Take 325 mg by mouth daily as needed for pain.       budesonide-formoterol (SYMBICORT) 160-4.5 mcg/actuation inhaler Inhale 2 puffs 2 (two) times a day. 1 Inhaler 1     cyanocobalamin, vitamin B-12, 2,500 mcg Subl Place under the tongue 2 (two) times a week.       diclofenac sodium (VOLTAREN) 1 % Gel  "Apply 2 g topically 2 (two) times a day as needed (pain on shoulder and ankle). 100 g 12     DULoxetine (CYMBALTA) 60 MG capsule Take 60 mg by mouth at bedtime.  1     [START ON 5/12/2019] fentaNYL (DURAGESIC) 12 mcg/hr Place 1 patch on the skin every third day. 10 patch 0     folic acid (FOLVITE) 1 MG tablet TAKE 1 TABLET (1 MG) BY ORAL ROUTE ONCE DAILY 90 tablet 3     [START ON 5/18/2019] HYDROcodone-acetaminophen 5-325 mg per tablet Take 1 tablet by mouth 4 (four) times a day as needed for pain. 100 tablet 0     levothyroxine (SYNTHROID, LEVOTHROID) 150 MCG tablet TAKE 1 TAB BY MOUTH ALTERNATING WITH 175 MCG 90 tablet 3     levothyroxine (SYNTHROID, LEVOTHROID) 175 MCG tablet TAKE 1 TAB BY MOUTH EVERY OTHER DAY ALTERNATING WITH  MCG DOSE 90 tablet 3     naloxone (NARCAN) 4 mg/actuation nasal spray 1 spray (4 mg dose) into one nostril for opioid reversal. Call 911. May repeat if no response in 3 minutes. 1 Box 0     omeprazole (PRILOSEC) 20 MG capsule TAKE 1 CAPSULE BY MOUTH 2 TIMES A  capsule 3     pramipexole (MIRAPEX) 1 MG tablet Take 0.5-1 tablets (0.5-1 mg total) by mouth 2 (two) times a day. 60 tablet 5     transparent dressings (TEGADERM) 2.375 X 4 \" Bndg Apply over the fentanyl patch to secure in place 20 each 1     alclomethasone (ACLOVATE) 0.05 % cream        betamethasone valerate (VALISONE) 0.1 % ointment Apply hs 45 g 1     clobetasol (TEMOVATE) 0.05 % cream        ergocalciferol (VITAMIN D2) 50,000 unit capsule Take 1 capsule (50,000 Units total) by mouth 2 (two) times a week. 24 capsule 3     polyethylene glycol (MIRALAX) 17 gram packet Take 1 packet (17 g total) by mouth daily. 30 packet 5     triamcinolone (KENALOG) 0.1 % cream Apply a thin layer to affected area once daily 15 g 0     Current Facility-Administered Medications   Medication Dose Route Frequency Provider Last Rate Last Dose     cyanocobalamin injection 1,000 mcg  1,000 mcg Intramuscular Q30 Days Chiesa, Loida Urvashi, MD   " 1,000 mcg at 02/19/19 1326        Allergies   Allergen Reactions     Cephalexin Other (See Comments)     Muscles aches,fatigue     Levofloxacin      Reaction unknown     Penicillins      Mouth sores     Sulfa (Sulfonamide Antibiotics)      Reaction not known       Patient Active Problem List   Diagnosis     Psoriatic Arthropathy     Osteopenia     Restless Legs Syndrome     Vitamin B12 deficiency     Depression     Postablative hypothyroidism     Vitamin D Deficiency     Hemochromatosis     Impaired Fasting Glucose     History of breast cancer     Morbid Obesity     Hypertension due to endocrine disorder     Esophageal Reflux     Psoriasis     Spinal Stenosis     Benign Adenomatous Polyp Of The Large Intestine     Joint Pain, Localized In The Hip     Anxiety state, unspecified     Sacroiliitis (H)     Neck pain     Acute pain of right shoulder     COPD (chronic obstructive pulmonary disease) (H)     Sleep apnea, obstructive     Acute postoperative respiratory insufficiency     Anxiety     Hypoglycemia     Drug-induced constipation     Hereditary hemochromatosis (H)     Malignant neoplasm of left female breast, unspecified estrogen receptor status, unspecified site of breast (H)     Chronic intractable pain       Past Medical History:   Diagnosis Date     Anxiety      Breast cancer (H) 1994     Chronic pain syndrome      COPD (chronic obstructive pulmonary disease) (H)      Depression      Esophageal reflux      Graves disease      Hemochromatosis      Hx antineoplastic chemotherapy      Hx of radiation therapy      Hypertension      Impaired fasting glucose      Pheochromocytoma      Psoriatic arthropathy (H)      Restless leg syndrome      Right rotator cuff tear      Sleep apnea 2017    CPAP     Spinal stenosis      Urinary incontinence      Vitamin B12 deficiency        Past Surgical History:   Procedure Laterality Date     BREAST BIOPSY       BREAST LUMPECTOMY Left 1994     JOINT REPLACEMENT       LAPAROSCOPIC  ADRENALECTOMY Left 8/2/2017    Procedure: LAPAROSCOPIC LEFT ADRENALECTOMY, ;  Surgeon: Gab Linares MD;  [place] [facility] [place]:      MASTECTOMY      left lumpectomy with axillary node dissection     VT ARTHRODESIS,ANKLE,OPEN Right     Description: Ankle Arthrodesis;  Recorded: 04/07/2010;     VT MASTECTOMY, MODIFIED RADICAL      Description: Modified Radical Mastectomy Left Breast;  Recorded: 04/07/2010;     VT REMOVE TONSILS/ADENOIDS,<13 Y/O      Description: Tonsillectomy With Adenoidectomy;  Recorded: 04/07/2010;     reconstructive breast surgery Right      REDUCTION MAMMAPLASTY Right     approx late 2015/early2016     TONSILLECTOMY         Social History     Socioeconomic History     Marital status:      Spouse name: Not on file     Number of children: Not on file     Years of education: Not on file     Highest education level: Not on file   Occupational History     Not on file   Social Needs     Financial resource strain: Not on file     Food insecurity:     Worry: Not on file     Inability: Not on file     Transportation needs:     Medical: Not on file     Non-medical: Not on file   Tobacco Use     Smoking status: Former Smoker     Last attempt to quit: 8/1/2003     Years since quitting: 15.6     Smokeless tobacco: Never Used   Substance and Sexual Activity     Alcohol use: No     Drug use: No     Sexual activity: Not on file   Lifestyle     Physical activity:     Days per week: Not on file     Minutes per session: Not on file     Stress: Not on file   Relationships     Social connections:     Talks on phone: Not on file     Gets together: Not on file     Attends [employer] service: Not on file     Active member of club or organization: Not on file     Attends meetings of clubs or organizations: Not on file     Relationship status: Not on file     Intimate partner violence:     Fear of current or ex partner: Not on file     Emotionally abused: Not on file     Physically abused: Not on  "file     Forced sexual activity: Not on file   Other Topics Concern     Not on file   Social History Narrative     Not on file             Objective:     Vitals:    04/09/19 0933   BP: 122/80   Pulse: (!) 102   SpO2: 94%   Weight: (!) 254 lb (115.2 kg)   Height: 5' 6\" (1.676 m)         Physical Exam:  EYES: Eyelids, conjunctiva, and sclera were normal. Pupils were normal. Cornea, iris, and lens were normal bilaterally.  HEAD, EARS, NOSE, MOUTH, AND THROAT: Head and face were normal. Hearing was normal to voice and the ears were normal to external exam. Nose appearance was normal and there was no discharge. Oropharynx was normal.  TMs were normal.  NECK: Neck appearance was normal. There were no neck masses and the thyroid was not enlarged.  RESPIRATORY: Breathing pattern was normal and the chest moved symmetrically.   Lung sounds are negative for rhonchi or rails.  However, wheezing is noted with forced vital capacity maneuver.  CARDIOVASCULAR: Heart rate and rhythm were normal.  She is tachycardic however.  S1 and S2 were normal and there were no extra sounds or murmurs. Peripheral pulses in arms and legs were normal.  Jugular venous pressure was normal.  There was no peripheral edema.  GASTROINTESTINAL: The abdomen is significantly obese.  Bowel sounds were present.   Palpation detected no tenderness, mass, or enlarged organs.   MUSCULOSKELETAL: Skeletal configuration was normal and muscle mass was normal for age. Joint appearance was overall normal.  LYMPHATIC: There were no enlarged nodes.  SKIN/HAIR/NAILS: Skin color was normal.  She has significant psoriasis.  NEUROLOGIC: The patient was alert and oriented to person, place, time, and circumstance. Speech was normal. Cranial nerves were normal. Motor strength was normal for age. The patient was normally coordinated.  PSYCHIATRIC:  Mood and affect were lightly constricted and the patient had normal recent and remote memory. The patient's judgment and insight " were normal.          There are no Patient Instructions on file for this visit.        Labs: Pending      Immunization History   Administered Date(s) Administered     INFLUENZA,RECOMBINANT,INJ,PF QUADRIVALENT 18+YRS 12/19/2018     Influenza [id number], 02/23/2010     Influenza, inj, historic,unspecified 12/04/2007, 12/10/2008, 12/10/2008, 02/23/2010, 09/17/2010, 09/07/2011     Influenza, seasonal,quad inj 36+ mos 10/27/2015, 09/06/2017     Influenza, seasonal,quad inj 6-35 mos 11/14/2012, 10/16/2013, 10/01/2014     Pneumo Conj 13-V (2010&after) 12/10/2014     Pneumo Polysac 23-V 03/23/2000     Tdap 05/21/2008           Electronically signed by Loida Stockton MD 04/09/19 9:36 AM

## 2021-05-27 NOTE — TELEPHONE ENCOUNTER
RN cannot approve Refill Request    RN can NOT refill this medication med is not covered by policy/route to provider. Last office visit: 2/13/2019 Loida Stockton MD Last Physical: 2/19/2019 Last MTM visit: Visit date not found Last visit same specialty: 2/13/2019 Loida Stockton MD.  Next visit within 3 mo: Visit date not found  Next physical within 3 mo: Visit date not found      Jono Torres, [facility] Triage/Med Refill 3/31/2019    Requested Prescriptions   Pending Prescriptions Disp Refills     folic acid (FOLVITE) 1 MG tablet [Pharmacy Med Name: FOLIC ACID 1 MG TABLET] 90 tablet 3     Sig: TAKE 1 TABLET (1 MG) BY ORAL ROUTE ONCE DAILY    There is no refill protocol information for this order

## 2021-05-27 NOTE — PATIENT INSTRUCTIONS - HE
"Plan:   Plan of Care / NextSteps:     Follow up the following date: 6/12/19      Education:   Please call Monday-Friday for problems or questions and one of the clinical support staff (CSS) will help to get things figured out. The number is [phone].     [employer] is a means to send an e-mail ([employer] message) to communicate any concerns.     Please remember some issues require an office visit.     Today we reviewed the plan of care and answered questions.     _____________________________________________  Important information and warnings about using medical cannabis:  *Use of medical cannabis products is experimental  *Common side effects include dizziness, fatigue, dry mouth, lightheadedness, drowsiness, nausea  *Tell all your health care professionals about the medical cannabis you are using  *The following groups are at increased risk of harm from use - those under 18, women who are pregnant or breast-feeding, persons with a personal or family history of psychotic disorder  *Risks for use by persons with serious heart or liver disease  *Do not drive, operate machinery, or do work that could harm people under the influence of medical cannabis  *Keep medication secure and in their original containers  *Do not use medical cannabis where it is illegal  *Do not give or sell to others medical cannabis that you purchase  *Risk of dependence and addiction (similar to caffeine)  __________________________________________________  Please review the following websites for information on locations, and also other frequently asked questions:  https://[employer]Global  https://[employer]Me[employer]  Http://www.health.state.[place]/index.html (look on the right column \"Featured Sites\" and choose Medical Cannabis tab  _______________________________________________________     Records: Reviewed to assist with preparation for the office visit and are reflected throughout the note.    Primary Care: You need to have an " annual physical and check in with your primary care folks on a regular basis. Talk with your primary care about the frequency of expected visits.     Behavioral Medicine: continue with counseling services    Interventional: You are having surgery on 4/19/19    Medication prescribed / to be continued:   Medication prescribed today:    Requested Prescriptions     Pending Prescriptions Disp Refills     fentaNYL (DURAGESIC) 12 mcg/hr 05/12/2019 for use 05/13-06/12 10 patch 0     Sig: Place 1 patch on the skin every third day.     HYDROcodone-acetaminophen 5-325 mg per tablet 04/18-05/18 then 5/18-6/12 120 tablet 0     Sig: Take 1 tablet by mouth 4 (four) times a day as needed for pain.     CBD is a chemical in hemp that does not lead to the high people experience with THC. CBD is permitted to have up to 0.3% THC and this can show up on a urine drug screen. There are preparations that are THC free. People are reporting they feel better - finding their muscles may be more relaxed, nervousness may be a bit less, that their sleep is supported. There are oral and topical preparations. The oral typically goes under the tongue and is kept there for 30-60 seconds. The topical is applied to the area where you have discomfort.     [facility]  [address]  [place] [address]  Hours: 11AM-7PM Monday-Saturday    [facility]   [email or web address]  [phone] 10:30-5:30 EST    [facility]  Email: [email or web address]  Phone: [phone]  [facility]  [address] [place] [address] [address]  [place] [address]      REFILL INSTRUCTIONS: Please contact your pharmacy and talk with your pharmacist for any refills of controlled substances. It will be beneficial to know the name of your medication, the date it was written ( 4/4/2019 ) and the date you are able to fill. Please remember the date filled and the date started in some cases are different. Please remember to use your medication during the  "dates of use.      SAFETY REMINDER  We need to be able to reach you. Please be certain we have a working telephone number. If we are unable to reach you we may not be able to continue to prescribe opioid medication.        FAQ  Q. Why is alcohol consumption a concern with pain medication?  A. Opioids decrease you drive to breathe. Alcohol enhances this effect.  Using together or within a week of each other is unsafe. If we can see it in the urine it is having an effect on your body.     Q. I live with chronic pain and take my medication like it is prescribed why am I at risk for an overdose?  A. Opioids decrease your drive to breathe. Generally a little decrease in drive to breathe is manageable. Illness like bladder infection, pneumonia, COPD, sleep apnea may decrease your ability to get oxygen. This can lead to an overdose.    If you are running a fever medication may be absorbed differently.     Nausea and vomiting have led to folks \"losing\" medication - additional dosing because we do not know how much may have been absorbed can lead to an overdose.     As we age general health changes occure and this can lead to increased issues with clearing medication from the liver or kidneys increasing the risk of an overdose.    Q. I have been using benzodiazepines for years with my opioids what happened that they should not be used together any more?  A. Combinations of medication can put a person at high risk for overdose. In one study it was noted that 80% of overdoses occurred in people who were taking a combination of opioids and benzodiazepines.    Q. I hear about Naloxone and I understand is a medicine that can be used if there is a concern about an overdose, should I have it available at home?  A. Anyone with an MME over 50 or who uses combinations of medication that enhance the effects of an opioid is a good candidate for Naloxone. If you do not have Naloxone ask me we can talk about it together.    Q. Why should " I have a safe?  A. You assume the responsibility of harm or death another person should someone else use your medication. A big concern would be if someone else got your medication. Your medication is not prescribed to anyone other than you. Do not sell, loan, borrow or share your medication with anyone. It is illegal.     Q. What does an overdose look like?  A. Symptoms of overdose include:   !breathing slow and shallow, erratic or not at all  !pinpoint pupils, hallucinations  !confusion  !muscle jerks, slack muscles   !extreme sleepiness or loss of alertness   !awake but not able to talk   !face pale or clammy, vomiting, for lighter skinned people, the skin tone turns bluish purple, for darker skinned people, it turns grayish or ashen   If in a situation where overdose is a concern engage the emergency response system (dial 911).

## 2021-05-27 NOTE — TELEPHONE ENCOUNTER
Medication Request  Medication name: Prednisone and an antibiotic  Pharmacy Name and Location: [facility] #[id number]  Reason for request: Patient states she has come down with a cold.  When this happens, Dr. Stockton gives her antibiotic and prednisone due to her history of COPD.  Patient is complaining of coughing, runny eyes and nose, headache, extremely tired.  She was due to have surgery tomorrow which she has cancelled.    When did you use medication last?:  Has not used yet  Patient offered appointment:  patient declined, states the provider treats her over the phone  Okay to leave a detailed message: yes

## 2021-05-28 ENCOUNTER — RECORDS - HEALTHEAST (OUTPATIENT)
Dept: ADMINISTRATIVE | Facility: CLINIC | Age: 66
End: 2021-05-28

## 2021-05-28 ASSESSMENT — ANXIETY QUESTIONNAIRES: GAD7 TOTAL SCORE: 14

## 2021-05-29 ENCOUNTER — RECORDS - HEALTHEAST (OUTPATIENT)
Dept: ADMINISTRATIVE | Facility: CLINIC | Age: 66
End: 2021-05-29

## 2021-05-29 NOTE — PATIENT INSTRUCTIONS - HE
Plan:   Plan of Care / NextSteps:     Follow up by: 8/11/19      Education:   Please call Monday-Friday for problems or questions and one of the clinical support staff (CSS) will help to get things figured out. The number is [phone].     [employer] is a means to send an e-mail ([employer] message) to communicate any concerns.     Please remember some issues require an office visit.     Today we reviewed the plan of care and answered questions.      Records: Reviewed to assist with preparation for the office visit and are reflected throughout the note.    Primary Care: You need to have an annual physical and check in with your primary care folks on a regular basis. Talk with your primary care about the frequency of expected visits.     Behavioral Medicine: Continue with Mabel    Integrative: I understand you are moving back to eating more fresh food    Rehabilitation: I understand you are waiting for your swim suite to start exercising in the pool    Medication prescribed / to be continued:   Medication prescribed today:    Requested Prescriptions     Signed Prescriptions Disp Refills     fentaNYL (DURAGESIC) 12 mcg/hr 06/15-07/15 10 patch 0     Sig: Place 1 patch on the skin every third day.     HYDROcodone-acetaminophen 5-325 mg per tablet 06/12-07/12 120 tablet 0     Sig: Take 1 tablet by mouth 4 (four) times a day as needed for pain.     HYDROcodone-acetaminophen 5-325 mg per tablet 7/12-8/11 120 tablet 0     Sig: Take 1 tablet by mouth 4 (four) times a day as needed for pain.     fentaNYL (DURAGESIC) 12 mcg/hr 7/15-8/14 10 patch 0     Sig: Place 1 patch on the skin every third day.         REFILL INSTRUCTIONS:   Please contact your pharmacy and talk with your pharmacist for any refills of controlled substances. It will be beneficial to know the name of your medication, the date it was written ( 6/6/2019 ) and the date you are able to fill. Please remember the date filled and the date started in some cases are  "different. Please remember to use your medication during the dates of use.      SAFETY REMINDER  We need to be able to reach you. Please be certain we have a working telephone number. If we are unable to reach you we may not be able to continue to prescribe opioid medication.        FAQ  Q. Why is alcohol consumption a concern with pain medication?  A. Opioids decrease you drive to breathe. Alcohol enhances this effect.  Using together or within a week of each other is unsafe. If we can see it in the urine it is having an effect on your body.     Q. I live with chronic pain and take my medication like it is prescribed why am I at risk for an overdose?  A. Opioids decrease your drive to breathe. Generally a little decrease in drive to breathe is manageable. Illness like bladder infection, pneumonia, COPD, sleep apnea may decrease your ability to get oxygen. This can lead to an overdose.    If you are running a fever medication may be absorbed differently.     Nausea and vomiting have led to folks \"losing\" medication - additional dosing because we do not know how much may have been absorbed can lead to an overdose.     As we age general health changes occure and this can lead to increased issues with clearing medication from the liver or kidneys increasing the risk of an overdose.    Q. I have been using benzodiazepines for years with my opioids what happened that they should not be used together any more?  A. Combinations of medication can put a person at high risk for overdose. In one study it was noted that 80% of overdoses occurred in people who were taking a combination of opioids and benzodiazepines.    Q. I hear about Naloxone and I understand is a medicine that can be used if there is a concern about an overdose, should I have it available at home?  A. Anyone with an MME over 50 or who uses combinations of medication that enhance the effects of an opioid is a good candidate for Naloxone. If you do not have " Naloxone ask me we can talk about it together.    Q. Why should I have a safe?  A. You assume the responsibility of harm or death another person should someone else use your medication. A big concern would be if someone else got your medication. Your medication is not prescribed to anyone other than you. Do not sell, loan, borrow or share your medication with anyone. It is illegal.     Q. What does an overdose look like?  A. Symptoms of overdose include:   !breathing slow and shallow, erratic or not at all  !pinpoint pupils, hallucinations  !confusion  !muscle jerks, slack muscles   !extreme sleepiness or loss of alertness   !awake but not able to talk   !face pale or clammy, vomiting, for lighter skinned people, the skin tone turns bluish purple, for darker skinned people, it turns grayish or ashen   If in a situation where overdose is a concern engage the emergency response system (dial 911).

## 2021-05-29 NOTE — TELEPHONE ENCOUNTER
"The patient called and left a message stating she had a reaction from fentanyl patch yesterday, legs and feet swelling and itching. Pt called after hours and was in touch with Dr. Dubois who advised her to take the patch off, take benadryl and go see her primary doc to possibly get a diuretic, pt has COPD. Nurse called the pt who said she \"knows the leg and feet swelling is from the fentanyl patch\" she stated she noticed this 2 weeks ago, it was not drastic, she said she was dizzy too. Pt called  today, was told she could not get in until wed but the doc said she is to go to [facility], pt told nurse she did not want to go, her  had the car and her left foot too swollen to put a shoe on. Nurse informed the patient she should go into the [facility] today even if she had to just wear socks. She has COPD and her breathing is an issue as is the water retention to her legs, it will not go away on its own. Pt verbalized \"ok you talked me into it\". Pt said her pain is currently under control with the norco she is taking. Pt asked where there are [facility] nurse told her about the [facility] location.  "

## 2021-05-29 NOTE — PROGRESS NOTES
Outpatient Mental Health Treatment Plan    Name:  Randi Damon  :  1955  MRN:  [id number]  Treatment Plan: updated Treatment Plan  Intake/initial treatment plan date:  2019  Benefit and risks and alternatives have been discussed: Yes  Is this treatment appropriate with minimal intrusion/restrictions: Yes  Estimated duration of treatment:  Approximately 20+ sessions.  Anticipated frequency of services:  Every 1 weeks  Necessity for frequency: This frequency is needed to establish therapeutic goals and for continuity of care in order to monitor progress.  Necessity for treatment: To address cognitive, behavioral, and/or emotional barriers in order to work toward goals and to improve quality of life.     Plan:      ? Depression                 Goal:    Decrease average depression level from 23 to 10 or below.              Strategies:       ?[x] Decrease social isolation                                      [x] Increase involvement in meaningful activities                                      ?[x] Discuss sleep hygiene                                      ?[x] Explore thoughts and expectations about self and others                                      ?[x] Process grief (loss of significant person, independence, role, etc.)                                      ?[x] Assess for suicide risk                                      ?[x] Implement physical activity routine (with physician approval)                                      [x] Consider introduction of bibliotherapy and/or videos                                      [x] Emotion regulation (DBT skills)                                      X- self-care strategies                                      X- communication strategies                                      X- Cognitive Restructuring                                      X- Maintain medication compliance                                       X- regular attendance with Psychiatrist                                                               X- grief/loss                                                              ?  ?Degree to which this is a problem: 4  Degree to which goal is met: 0  Date of Review: 6/7/2019                                                                                ?              ? Anxiety/PTSD         Goal:    Decrease average anxiety level from 12 to 6 or below.              Strategies:       ? [x]Learn and practice relaxation techniques and other coping strategies (e.g., thought stopping, reframing, meditation)                                      ? [x] Increase involvement in meaningful activities                                      ? [x] Discuss sleep hygiene                                      ? [x] Explore thoughts and expectations about self and others                                      ? [x] Identify and monitor triggers for panic/anxiety symptoms                                      ? [x] Implement physical activity routine (with physician approval)                                      ? [x] Consider introduction of bibliotherapy and/or videos                                      ? [x] Continue compliance with medical treatment plan (or explore barriers)                                                  X- Anger management strategies                                      X- stress management skills                                         X- impulse control skills                                            X- consider EMDR                                   Degree to which this is a problem: 4  Degree to which goal is met: 0  Date of Review: 6/7/2019     Chronic pain  Goal:    ? Decrease average pain level from 8/10  to 5/10.                 Strategies:       ? [x] Explore thoughts and expectations about self and others                                                                  [x] Explore emotional reactions to illness/injury                                       ?  [x] Learn and practice relaxation techniques and other coping strategies                                             [x] Implement physical activity routine (with physician approval)                                      ? [x] Engage in values clarification and goal-setting                                      ? [x] Consider introduction of bibliotherapy and/or videos                                      ? [x] Increase involvement in meaningful activities                                      ? [x] Discuss sleep hygiene                                      ? [x] Process grief (loss of significant person, independence, role   etc.)                                      ? [x] Assess for suicide risk                                      ?  Degree to which this is a problem: 4  Degree to which goal is met: 0  /Date of Review: 6/7/2019     Functional Impairment:  1=Not at all/Rarely  2=Some days  3=Most Days  4=Every Day    Personal : 4  Family : 4  Social : 4   Work/school : 4     Diagnosis:  Major Depression, recurrent, severe  Generalized Anxiety Disorder  Chronic PTSD  R/O Bipolar Disorder     WHODAS 2.0 12-item version 50%  H1= 30  H2= 30  H3= 30     Scores presented in qualifiers to represent level of disability.     NO problem - (none, absent, negligible,  ) - 0-4 %   MILD problem - (slight, low, ) - 5-24 %   MODERATE problem - (medium, fair,...) - 25-49 %   SEVERE problem - (high, extreme,  ) - 50-95 %   COMPLETE problem - (total, ) -  %           Clinical assessments and measures completed:. CHAVO-7, PHQ-9, Mood Questionnaire current, CAGE-AID and PANSI     Strengths:  Motivated, intelligent, insightful   Limitations:  Physical Limitation, lack of support system   Cultural Considerations: Pt is a 63 year old, marrried,  female w/ hx of trauma & chronic pain.      Persons responsible for this plan: Patient and Provider         Psychotherapist Signature           Patient Signature:              Guardian  Signature             Provider: Performed and documented by SUSAN Brandon   Date:  6/13/2019

## 2021-05-29 NOTE — PROGRESS NOTES
Patient called 6/23, concerned with side effects of medications were fentanyl patch.  She reports lower extremity swelling, some shortness of breath, has a diagnosis of COPD.  Wonders if symptoms are worsening since 12 mcg patch.  We discussed discontinuing the patch, possible withdrawal.  She has small medications for lower extremity swelling and I suggested she call her primary care doctor.  She does not appear to have orthopnea or PND, however with her increased fluid accumulation her lower extremities a indicated she should contact her primary care provider today.  If there is any worsening breathing she will go to the emergency room. she will call her pain clinic provider on Monday

## 2021-05-29 NOTE — TELEPHONE ENCOUNTER
These let Winnie know that her x-ray is fine.  However, her oxygen levels are on the low side with exercise.  I want her to drop in here on Friday and see 1 of my partners.  She should have a pulse oximetry done at that time.  If her lungs are improved, she can proceed with her surgery.    The preop is done.  Please have her examined briefly by 1 of my partners on Friday if possible.

## 2021-05-29 NOTE — PROGRESS NOTES
ASSESSMENT:   1. Edema, unspecified type     2. Chest discomfort     3. ANNA (dyspnea on exertion)         PLAN:  Based on history of patients symptoms, feel patient warrants a more expedited work-up than can be accomplished in the walk-in clinic today. Patient was hemodynamically stable and able to travel by self. Patient referred to ED for further work-up. Attempted to call report, but there was no answer.    I discussed red flag symptoms, return precautions to clinic/ER and follow up care with patient/parent.  Expected clinical course, symptomatic cares advised. Questions answered. Patient/parent amenable with plan.    Patient Instructions:  There are no Patient Instructions on file for this visit.    SUBJECTIVE:   Randi Damon is a 63 y.o. female with a complex medical history who presents alone today with an approxiamate 1 month complaint of bilateral lower extremity edema, increased shortness of breath, 20+ pound weight gain, and dizzy sensation with movement. She also complains of a heavy sensation in her chest and nausea that has been present for the past week. Denies fever, vomiting. She reports initially atributing her symptoms to an allergic reaction to her fentanyl patch, and removed her last patch a day late (yesterday) and has not reapplied a new patch). Patient has not reported a change in her symptoms since the patch was removed. Additionally, patient verbalized an increased stressors at home dealing with a sick dog.      ROS:  Comprehensive 12 pt ROS completed, positives noted in HPI, otherwise negative.      Past Medical History:  Patient Active Problem List   Diagnosis     Psoriatic Arthropathy     Osteopenia     Restless Legs Syndrome     Vitamin B12 deficiency     Depression     Postablative hypothyroidism     Vitamin D Deficiency     Hemochromatosis     Impaired Fasting Glucose     History of breast cancer     Morbid Obesity     Hypertension due to endocrine disorder     Esophageal Reflux      Psoriasis     Spinal Stenosis     Benign Adenomatous Polyp Of The Large Intestine     Joint Pain, Localized In The Hip     Anxiety state, unspecified     Sacroiliitis (H)     Neck pain     COPD (chronic obstructive pulmonary disease) (H)     Sleep apnea, obstructive     Acute postoperative respiratory insufficiency     Anxiety     Hypoglycemia     Drug-induced constipation     Hereditary hemochromatosis (H)     Malignant neoplasm of left female breast, unspecified estrogen receptor status, unspecified site of breast (H)     Chronic intractable pain       Surgical History:  Past Surgical History:   Procedure Laterality Date     Adrenalectomy for pheochromocytoma       BREAST BIOPSY       BREAST LUMPECTOMY Left 1994     JOINT REPLACEMENT       LAPAROSCOPIC ADRENALECTOMY Left 8/2/2017    Procedure: LAPAROSCOPIC LEFT ADRENALECTOMY, ;  Surgeon: Gab Linares MD;  Location: [facility] [place];  Service:      MASTECTOMY      left lumpectomy with axillary node dissection     WA ARTHRODESIS,ANKLE,OPEN Right     Description: Ankle Arthrodesis;  Recorded: 04/07/2010;     WA MASTECTOMY, MODIFIED RADICAL      Description: Modified Radical Mastectomy Left Breast;  Recorded: 04/07/2010;     WA REMOVE TONSILS/ADENOIDS,<11 Y/O      Description: Tonsillectomy With Adenoidectomy;  Recorded: 04/07/2010;     reconstructive breast surgery Right      REDUCTION MAMMAPLASTY Right     approx late [id number]     TONSILLECTOMY             Family History:  Family History   Problem Relation Age of Onset     Heart disease Father      Obesity Father      Hemochromatosis Father      Obesity Mother      Arthritis Mother      Obesity Sister      Cardiovascular Sister      Hypertension Sister      Arthritis Sister      Hemochromatosis Sister      Lupus Sister      Scleroderma Sister      Cardiovascular Brother      Hypertension Brother      Arthritis Brother      Hemochromatosis Brother      Prostate cancer Brother      Cardiovascular  Maternal Grandmother      Stroke Paternal Grandmother      Breast cancer Neg Hx        Reviewed; Non-contributory    Social History     Tobacco Use   Smoking Status Former Smoker     Last attempt to quit: 8/1/2003     Years since quitting: 15.9   Smokeless Tobacco Never Used       Smoking: denies  Living situation: with spouse    Current Medications:  Current Outpatient Medications on File Prior to Visit   Medication Sig Dispense Refill     albuterol (PROAIR HFA;PROVENTIL HFA;VENTOLIN HFA) 90 mcg/actuation inhaler Inhale 2 puffs every 6 (six) hours as needed for wheezing.       albuterol (PROVENTIL) 2.5 mg /3 mL (0.083 %) nebulizer solution Take 3 mL (2.5 mg total) by nebulization every 6 (six) hours as needed for wheezing. 75 mL 12     aspirin 325 MG EC tablet Take 325 mg by mouth daily as needed for pain.       budesonide-formoterol (SYMBICORT) 160-4.5 mcg/actuation inhaler Inhale 2 puffs 2 (two) times a day. 1 Inhaler 1     cyanocobalamin, vitamin B-12, 2,500 mcg Subl Place under the tongue 2 (two) times a week.       diclofenac sodium (VOLTAREN) 1 % Gel Apply 2 g topically 2 (two) times a day as needed (pain on shoulder and ankle). 100 g 12     doxycycline (VIBRA-TABS) 100 MG tablet Take 1 tablet (100 mg total) by mouth 2 (two) times a day for 10 days. 20 tablet 0     DULoxetine (CYMBALTA) 60 MG capsule Take 1 capsule (60 mg total) by mouth daily. 30 capsule 1     ergocalciferol (VITAMIN D2) 50,000 unit capsule Take 50,000 Units by mouth 2 (two) times a week.       [START ON 7/15/2019] fentaNYL (DURAGESIC) 12 mcg/hr Place 1 patch on the skin every third day. 10 patch 0     folic acid (FOLVITE) 1 MG tablet TAKE 1 TABLET (1 MG) BY ORAL ROUTE ONCE DAILY 90 tablet 3     [START ON 7/12/2019] HYDROcodone-acetaminophen 5-325 mg per tablet Take 1 tablet by mouth 4 (four) times a day as needed for pain. 120 tablet 0     levothyroxine (SYNTHROID, LEVOTHROID) 150 MCG tablet TAKE 1 TAB BY MOUTH ALTERNATING WITH 175 MCG 30  tablet 0     levothyroxine (SYNTHROID, LEVOTHROID) 175 MCG tablet TAKE 1 TAB BY MOUTH EVERY OTHER DAY ALTERNATING WITH  MCG DOSE 30 tablet 0     naloxone (NARCAN) 4 mg/actuation nasal spray 1 spray (4 mg dose) into one nostril for opioid reversal. Call 911. May repeat if no response in 3 minutes. 1 Box 0     omeprazole (PRILOSEC) 20 MG capsule TAKE 1 CAPSULE BY MOUTH 2 TIMES A DAY 60 capsule 0     pramipexole (MIRAPEX) 1 MG tablet Take 0.5-1 tablets (0.5-1 mg total) by mouth 2 (two) times a day. 60 tablet 5     predniSONE (DELTASONE) 10 mg tablet Take 40mg by mouth daily for 3 days, then 30mg x 3 days, then 20mg x 3 days ,then 10mg x 3 days then stop. 30 tablet 0     Current Facility-Administered Medications on File Prior to Visit   Medication Dose Route Frequency Provider Last Rate Last Dose     cyanocobalamin injection 1,000 mcg  1,000 mcg Intramuscular Q30 Days Loida Stockton MD   1,000 mcg at 05/28/19 0912       Allergies:   Allergies   Allergen Reactions     Cephalexin Other (See Comments)     Muscles aches,fatigue     Levofloxacin      Reaction unknown     Penicillins      Mouth sores     Sulfa (Sulfonamide Antibiotics)      Reaction not known       OBJECTIVE:   Vitals:    06/24/19 1601   BP: 118/68   Pulse: (!) 101   Resp: 18   Temp: 98.4  F (36.9  C)   TempSrc: Oral   SpO2: 96%   Weight: (!) 259 lb (117.5 kg)     Physical exam reveals a pleasant 63 y.o. female.   General: Appears alert and cooperative. Non-toxic appearance.  Eyes:  No conjunctivitis, lids normal.   Ears:  Normal appearing auricle. External auditory meatus without excessive cerumen, edema or erythema. normal appearance.  No mastoid tenderness. No pain with palpation over tragus.  Nose:    Mucosa normal. No rhinorrhea. No sinus tenderness.  Mouth:  Mucosa pink and moist.  normal-appearing mucosa. No trismus. No evidence of PTA. Normal phonation.  Neck: normal, supple and no adenopathy  Pulmonary/Chest: Chest is coarse with  wheezing, rhonchi or rales heard in all lung fields. Symmetrical chest wall movement.  Heart: regular rate and rhythm  Abdomen: soft, nontender. No masses or organomegaly  Neuro: Alert, oriented. Non-focal.  Skin: pink, warm, dry. Bilateral lower extremity +2 pitting edema present (Left > Right),   Psychiatric: Normal mood and affect.  Normal judgement and thought content. Normal behavior.       RADIOLOGY    No results found.    LABORATORY STUDIES    No results found for this or any previous visit (from the past 24 hour(s)).      Delores Lim, TYLER Student      I, Nicolette Torres, was present with the NP student who participated in the service and in the documentation of the note.  I have verified the history and personally performed the physical exam and medical decision making.  I agree with the assessment and plan of care as documented in the note.       Nicolette Torres, CNP

## 2021-05-29 NOTE — TELEPHONE ENCOUNTER
PCP Pt would like a refill of Duloxetine for one supply until she gets in to see her new therapist   Pt would like a refill just for one month supply because she is out       Please sign rx's for 30 day supply for Levothyroxine

## 2021-05-29 NOTE — TELEPHONE ENCOUNTER
Question following Office Visit  When did you see your provider:5/28/19 pre -op  What is your question: Patient is not feeling well. She was out of the Mriapex for restless legs. She did get her rx ordered.  She does not want to do surgery at this point.  She feels drained.  She will cancel it with surgeon.  She did want nursing to do this but writer stated that was for the patient to do if they have any questions.  She feels torn if she should cancel or not but she is not at full strength to do surgery .  Transferred to scheduling to make appointment with PCP.   Okay to leave a detailed message: Yes [phone]

## 2021-05-29 NOTE — TELEPHONE ENCOUNTER
Spoke with pt and relayed PCP message. PT understanding.  Pt was extremely hesitant on the phone to go anywhere to be evaluated.  CA reiterated several times that pt needed to be seen.

## 2021-05-29 NOTE — TELEPHONE ENCOUNTER
Sounds as though she should be seen at [facility] TODAY.   I AM NOT AVAILABLE UNTIL Wednesday.

## 2021-05-29 NOTE — PROGRESS NOTES
Patient is here for a follow up with Anne-Marie Little CNP for her neck and shoulder pain      *Universal Precautions:    UDT/SWAB- 04/14/2018  Consent/Agreement- 06/06/2019  Pharmacy- as documented    Count- n/a   Psychological evaluation last OV 02/06/2019 with Mabel Merida  6/15/18 MME=50  02/15/19 MME= 45  03/19/19 MME- 50  04/04/19 MME-50  06/06/2019 ME- 50  Pharmacogenetic testing- n/a  MTM: n/a  Naloxone safety: 02/15/2019

## 2021-05-29 NOTE — TELEPHONE ENCOUNTER
Refills Approved for levothyroxine (both doses) per Medication Renewal Policy.  Medications was last renewed on 12/19/2018.  However duloxetine is a historical medication.  Therefore being routed to PCP for review.  Last OV 5/28/2019.    Emerita Nguyen, [facility] Triage/Med Refill 6/10/2019     Requested Prescriptions   Pending Prescriptions Disp Refills     levothyroxine (SYNTHROID, LEVOTHROID) 150 MCG tablet 90 tablet 3     Sig: TAKE 1 TAB BY MOUTH ALTERNATING WITH 175 MCG       Thyroid Hormones Protocol Passed - 6/7/2019 11:46 AM        Passed - Provider visit in past 12 months or next 3 months     Last office visit with prescriber/PCP: 4/9/2019 Loida Stockton MD OR same dept: 4/9/2019 Loida Stockton MD OR same specialty: 4/9/2019 Loida Stockton MD  Last physical: 5/28/2019 Last MTM visit: Visit date not found   Next visit within 3 mo: Visit date not found  Next physical within 3 mo: Visit date not found  Prescriber OR PCP: Loida Stockton MD  Last diagnosis associated with med order: 1. Postablative hypothyroidism  - levothyroxine (SYNTHROID, LEVOTHROID) 150 MCG tablet; TAKE 1 TAB BY MOUTH ALTERNATING WITH 175 MCG  Dispense: 90 tablet; Refill: 3  - levothyroxine (SYNTHROID, LEVOTHROID) 175 MCG tablet; TAKE 1 TAB BY MOUTH EVERY OTHER DAY ALTERNATING WITH  MCG DOSE  Dispense: 90 tablet; Refill: 3    2. Gastroesophageal reflux disease with esophagitis  - omeprazole (PRILOSEC) 20 MG capsule; TAKE 1 CAPSULE BY MOUTH 2 TIMES A DAY  Dispense: 180 capsule; Refill: 3    If protocol passes may refill for 12 months if within 3 months of last provider visit (or a total of 15 months).             Passed - TSH on file in past 12 months for patient age 12 & older     TSH   Date Value Ref Range Status   12/19/2018 3.19 0.30 - 5.00 uIU/mL Final                   levothyroxine (SYNTHROID, LEVOTHROID) 175 MCG tablet 90 tablet 3     Sig: TAKE 1 TAB BY MOUTH EVERY OTHER DAY ALTERNATING WITH   MCG DOSE       Thyroid Hormones Protocol Passed - 6/7/2019 11:46 AM        Passed - Provider visit in past 12 months or next 3 months     Last office visit with prescriber/PCP: 4/9/2019 Loida Stockton MD OR same dept: 4/9/2019 Loida Stockton MD OR same specialty: 4/9/2019 Loida Stockton MD  Last physical: 5/28/2019 Last MTM visit: Visit date not found   Next visit within 3 mo: Visit date not found  Next physical within 3 mo: Visit date not found  Prescriber OR PCP: Loida Stockton MD  Last diagnosis associated with med order: 1. Postablative hypothyroidism  - levothyroxine (SYNTHROID, LEVOTHROID) 150 MCG tablet; TAKE 1 TAB BY MOUTH ALTERNATING WITH 175 MCG  Dispense: 90 tablet; Refill: 3  - levothyroxine (SYNTHROID, LEVOTHROID) 175 MCG tablet; TAKE 1 TAB BY MOUTH EVERY OTHER DAY ALTERNATING WITH  MCG DOSE  Dispense: 90 tablet; Refill: 3    2. Gastroesophageal reflux disease with esophagitis  - omeprazole (PRILOSEC) 20 MG capsule; TAKE 1 CAPSULE BY MOUTH 2 TIMES A DAY  Dispense: 180 capsule; Refill: 3    If protocol passes may refill for 12 months if within 3 months of last provider visit (or a total of 15 months).             Passed - TSH on file in past 12 months for patient age 12 & older     TSH   Date Value Ref Range Status   12/19/2018 3.19 0.30 - 5.00 uIU/mL Final                   omeprazole (PRILOSEC) 20 MG capsule 180 capsule 3     Sig: TAKE 1 CAPSULE BY MOUTH 2 TIMES A DAY       GI Medications Refill Protocol Passed - 6/7/2019 11:46 AM        Passed - PCP or prescribing provider visit in last 12 or next 3 months.     Last office visit with prescriber/PCP: 4/9/2019 Loida Stockton MD OR [facility] dept: 4/9/2019 Loida Stockton MD OR same specialty: 4/9/2019 Loida Stockton MD  Last physical: 5/28/2019 Last MTM visit: Visit date not found   Next visit within 3 mo: Visit date not found  Next physical within 3 mo: Visit date not found  Prescriber OR PCP: Loida Stockton  MD  Last diagnosis associated with med order: 1. Postablative hypothyroidism  - levothyroxine (SYNTHROID, LEVOTHROID) 150 MCG tablet; TAKE 1 TAB BY MOUTH ALTERNATING WITH 175 MCG  Dispense: 90 tablet; Refill: 3  - levothyroxine (SYNTHROID, LEVOTHROID) 175 MCG tablet; TAKE 1 TAB BY MOUTH EVERY OTHER DAY ALTERNATING WITH  MCG DOSE  Dispense: 90 tablet; Refill: 3    2. Gastroesophageal reflux disease with esophagitis  - omeprazole (PRILOSEC) 20 MG capsule; TAKE 1 CAPSULE BY MOUTH 2 TIMES A DAY  Dispense: 180 capsule; Refill: 3    If protocol passes may refill for 12 months if within 3 months of last provider visit (or a total of 15 months).             DULoxetine (CYMBALTA) 30 MG capsule 270 capsule 3     Sig: Take 3 capsules (90 mg total) by mouth daily.       Tricyclics/Misc Antidepressant/Antianxiety Meds Refill Protocol Passed - 6/7/2019 11:46 AM        Passed - PCP or prescribing provider visit in last year     Last office visit with prescriber/PCP: 4/9/2019 Loida Stockton MD OR same dept: 4/9/2019 Loida Stockton MD OR same specialty: 4/9/2019 Loida Stockton MD  Last physical: 5/28/2019 Last MTM visit: Visit date not found   Next visit within 3 mo: Visit date not found  Next physical within 3 mo: Visit date not found  Prescriber OR PCP: Loida Stockton MD  Last diagnosis associated with med order: 1. Postablative hypothyroidism  - levothyroxine (SYNTHROID, LEVOTHROID) 150 MCG tablet; TAKE 1 TAB BY MOUTH ALTERNATING WITH 175 MCG  Dispense: 90 tablet; Refill: 3  - levothyroxine (SYNTHROID, LEVOTHROID) 175 MCG tablet; TAKE 1 TAB BY MOUTH EVERY OTHER DAY ALTERNATING WITH  MCG DOSE  Dispense: 90 tablet; Refill: 3    2. Gastroesophageal reflux disease with esophagitis  - omeprazole (PRILOSEC) 20 MG capsule; TAKE 1 CAPSULE BY MOUTH 2 TIMES A DAY  Dispense: 180 capsule; Refill: 3    If protocol passes may refill for 12 months if within 3 months of last provider visit (or a total of 15  months).

## 2021-05-29 NOTE — TELEPHONE ENCOUNTER
Patient is asking for this to be expedited since she is running low on her medications and has been having issues getting these filled

## 2021-05-29 NOTE — TELEPHONE ENCOUNTER
Medication Question or Clarification  Who is calling: Patient  What medication are you calling about? (include dose and sig)    Disp Refills Start End    fentaNYL (DURAGESIC) 12 mcg/hr 10 patch 0 7/15/2019 8/14/2019    Sig - Route: Place 1 patch on the skin every third day. - Transdermal    Sent to pharmacy as: fentaNYL (DURAGESIC) 12 mcg/hr    Earliest Fill Date: 7/14/2019    Notes to Pharmacy: Please fill 7/15-8/14    E-Prescribing Status: Receipt confirmed by pharmacy (6/6/2019 12:05 PM CDT)      Who prescribed the medication?: Consuelo Little CNP  What is your question/concern?: Patient stated she would like to know if Loida Stockton MD would like to see for a follow up. Patient reported that she had an allergic reaction to the fentanyl patches and taken off of it by the pain clinic yesterday. Patient stated she was outside over the weekend, planting plants when her legs swelled up, she had trouble walking, and she was short of breath. Patient stated she called the pain clinic and was told get off of patches. Patient is questioning if Loida Stockton MD wants to see for this as a follow up. Patient stated the pain clinic told her her breathing issue may have been exacerbated by the patch.  Pharmacy: n/a  Okay to leave a detailed message?: Yes  Site CMT - Please call the pharmacy to obtain any additional needed information.

## 2021-05-29 NOTE — TELEPHONE ENCOUNTER
If she does not feel right she should cancel surgery.  She should call and cancel.. Does she need mirapex reordered?  I think her psychiatrist orders

## 2021-05-29 NOTE — PROGRESS NOTES
[facility] Follow Up Note    Assessment/Plan:  1. Chronic obstructive pulmonary disease with acute exacerbation (H) with bronchitis and green purulent sputum  Exam consistent with bilateral wheezing and diminished aeration  Recommendations: Use air conditioner to avoid pollutants in the air.  Wear a mask when gardening.  Medications as below.  Continue nebulizer 2 times daily.  Follow-up in 2 weeks if symptoms not significantly improved  - doxycycline (VIBRA-TABS) 100 MG tablet; Take 1 tablet (100 mg total) by mouth 2 (two) times a day for 10 days.  Dispense: 20 tablet; Refill: 0  - predniSONE (DELTASONE) 10 mg tablet; Take 40mg by mouth daily for 3 days, then 30mg x 3 days, then 20mg x 3 days ,then 10mg x 3 days then stop.  Dispense: 30 tablet; Refill: 0      Loida Stockton MD    Chief Complaint:  Chief Complaint   Patient presents with     Follow-up       History of Present Illness:  Randi is a 63 y.o. female with a history of COPD, hereditary hemochromatosis, breast cancer and multiple areas of arthropathy.  She is here today for a lung evaluation.  Of note, she was here for preoperative examination and had low oxygen levels along with wheezing.  She was treated with bronchodilators for COPD exacerbation with negative chest x-ray.  She has not really improved.  She canceled her surgery and is here today.  She states she continues to cough.  Now, she is producing a yellowish-greenish phlegm.  The mucus is loose.  She has some dyspnea on exertion.  Additionally, she states that she is trying to get her garden.  She has been working outside in the ER despite all of the irritants in the air.  She and her  are not using their air conditioner.    Her other medical comorbidities are that of chronic pain and anxiety.  She wonders why she is on more pain medication now than before she had her foot surgery.  She has not talked to her foot surgeon recently.    Review of Systems:  A comprehensive  review of systems was performed and was otherwise negative    PFSH:  Social History: He is .  She and her  have no children.  There is some discord over her multiple chronic illnesses  Social History     Tobacco Use   Smoking Status Former Smoker     Last attempt to quit: 8/1/2003     Years since quitting: 15.8   Smokeless Tobacco Never Used       Past History:   Past Medical History:   Diagnosis Date     Anxiety      Breast cancer (H) 1994     Chronic pain syndrome      COPD (chronic obstructive pulmonary disease) (H)      Depression      Esophageal reflux      Graves disease      Hemochromatosis      Hx antineoplastic chemotherapy      Hx of radiation therapy      Hypertension      Impaired fasting glucose      Pheochromocytoma      Psoriasis      Psoriatic arthropathy (H)      Restless leg syndrome      Right rotator cuff tear      Sleep apnea 2017    CPAP     Spinal stenosis      Urinary incontinence      Vitamin B12 deficiency        Current Outpatient Medications   Medication Sig Dispense Refill     albuterol (PROAIR HFA;PROVENTIL HFA;VENTOLIN HFA) 90 mcg/actuation inhaler Inhale 2 puffs every 6 (six) hours as needed for wheezing.       albuterol (PROVENTIL) 2.5 mg /3 mL (0.083 %) nebulizer solution Take 3 mL (2.5 mg total) by nebulization every 6 (six) hours as needed for wheezing. 75 mL 12     aspirin 325 MG EC tablet Take 325 mg by mouth daily as needed for pain.       budesonide-formoterol (SYMBICORT) 160-4.5 mcg/actuation inhaler Inhale 2 puffs 2 (two) times a day. 1 Inhaler 1     cyanocobalamin, vitamin B-12, 2,500 mcg Subl Place under the tongue 2 (two) times a week.       diclofenac sodium (VOLTAREN) 1 % Gel Apply 2 g topically 2 (two) times a day as needed (pain on shoulder and ankle). 100 g 12     DULoxetine (CYMBALTA) 60 MG capsule Take 1 capsule (60 mg total) by mouth daily. 30 capsule 1     ergocalciferol (VITAMIN D2) 50,000 unit capsule Take 50,000 Units by mouth 2 (two) times a  "week.       [START ON 7/15/2019] fentaNYL (DURAGESIC) 12 mcg/hr Place 1 patch on the skin every third day. 10 patch 0     folic acid (FOLVITE) 1 MG tablet TAKE 1 TABLET (1 MG) BY ORAL ROUTE ONCE DAILY 90 tablet 3     [START ON 7/12/2019] HYDROcodone-acetaminophen 5-325 mg per tablet Take 1 tablet by mouth 4 (four) times a day as needed for pain. 120 tablet 0     levothyroxine (SYNTHROID, LEVOTHROID) 150 MCG tablet TAKE 1 TAB BY MOUTH ALTERNATING WITH 175 MCG 30 tablet 0     levothyroxine (SYNTHROID, LEVOTHROID) 175 MCG tablet TAKE 1 TAB BY MOUTH EVERY OTHER DAY ALTERNATING WITH  MCG DOSE 30 tablet 0     naloxone (NARCAN) 4 mg/actuation nasal spray 1 spray (4 mg dose) into one nostril for opioid reversal. Call 911. May repeat if no response in 3 minutes. 1 Box 0     omeprazole (PRILOSEC) 20 MG capsule TAKE 1 CAPSULE BY MOUTH 2 TIMES A DAY 60 capsule 0     pramipexole (MIRAPEX) 1 MG tablet Take 0.5-1 tablets (0.5-1 mg total) by mouth 2 (two) times a day. 60 tablet 5     doxycycline (VIBRA-TABS) 100 MG tablet Take 1 tablet (100 mg total) by mouth 2 (two) times a day for 10 days. 20 tablet 0     predniSONE (DELTASONE) 10 mg tablet Take 40mg by mouth daily for 3 days, then 30mg x 3 days, then 20mg x 3 days ,then 10mg x 3 days then stop. 30 tablet 0     Current Facility-Administered Medications   Medication Dose Route Frequency Provider Last Rate Last Dose     cyanocobalamin injection 1,000 mcg  1,000 mcg Intramuscular Q30 Days Loida Stockton MD   1,000 mcg at 05/28/19 0912       Family History: Noncontributory    Physical Exam:  General Appearance:   She appears at baseline.  Her oxygen level is 89% with activity.  Baseline is 95%.  Weight is up 2 pounds.  Vitals:    06/19/19 1013 06/19/19 1014   BP: 136/84    Patient Site: Left Arm    Patient Position: Sitting    Cuff Size: Adult Large    Pulse: 88    SpO2: (!) 89% 95%   Weight: (!) 262 lb 14.4 oz (119.3 kg)    Height: 5' 6\" (1.676 m)      Wt Readings from " Last 3 Encounters:   06/19/19 (!) 262 lb 14.4 oz (119.3 kg)   06/06/19 (!) 260 lb (117.9 kg)   05/28/19 (!) 260 lb (117.9 kg)     Body mass index is 42.43 kg/m .    Head neck exam is performed.  Pupils are equal reactive to light.  Ears and throat are intact.  Lungs reveal markedly diminished breath sounds.  There is wheezing with forced vital capacity maneuver.  Occasional scattered rhonchi are noted  Cardiac exam reveals regular rate and rhythm  Abdomen is markedly obese    Data Review:    Analysis and Summary of Old Records (2): Reviewed pain clinic and psychotherapy records    Records Requested (1):       Other History Summarized (from other people in the room) (2):     Radiology Tests Summarized (XRAY/CT/MRI/DXA) (1): Viewed x-ray from last visit    Labs Reviewed (1):     Medicine Tests Reviewed (EKG/ECHO/COLONOSCOPY/EGD) (1):     Independent Review of EKG or X-RAY (2):

## 2021-05-29 NOTE — PROGRESS NOTES
Preoperative Exam    Scheduled Procedure: Left Roator cuff repair  Surgery Date:  6/4/19  Surgery Location: [facility], fax [phone]    Surgeon:  Dr Scott     Assessment/Plan:     1. Preop general physical exam  Randi has multiple medical problems.  Pending normal labs, EKG and chest x-ray, she will be cleared for anesthesia for left rotator cuff surgery.  Medication recommendations: She is to discontinue aspirin, nonsteroidal anti-inflammatory medications, vitamin E and fish oil 7 to 10 days prior to surgery.  On the a.m. of surgery, she will take levothyroxine and Cymbalta with sips of water.  She will skip her oral pain medication.  Of note, she does wear a fentanyl patch.  This will remain in place.  Her  will provide aftercare.    Of note, previous anesthesia history is reviewed.  She has a history of POST-OPERATIVE HYPOXEMIA -secondary to underlying COPD. There Is no reported history of allergies to anesthetic agents.    - HM2(CBC w/o Differential)  - Comprehensive Metabolic Panel  - Electrocardiogram Perform and Read-the twelve-lead EKG is personally reviewed by me.  It reveals an incomplete right bundle branch block.  It is unchanged from the previous EKG done in February and also 1 year ago.    2. Chronic obstructive pulmonary disease, unspecified COPD type (H)  With recent exacerbation-delaying the surgery in April.  Expiratory wheezes on examination-consistent with her COPD.  Recommendations: Will recheck chest x-ray, exercise oximetry and EKG.  If these are stable, she will be able to proceed with anesthesia.    - XR Chest 2 Views- No active infiltrate  - Pulse Oximetry-pulse oximetry shows a lower pulse oximetry with ambulation.  - Electrocardiogram Perform and Read    Recommendation: Would like patient to stop in toward the end of the week for a recheck on pulse oximetry.  In the interim, she will do her breathing treatments with great compliance up to 2-3 times per day.  May  proceed with surgery if oxygen levels greater than 90%.    3.  Obstructive sleep apnea  Secondary to morbid obesity-she does not have a CPAP.    4. Hereditary hemochromatosis (H)  Stable    5. Morbid Obesity  Increased weight-consistent with emotional eating and marital strife.  Markedly sedentary  Recommendations: Journal of food intake.  Attempt to take short walks daily.  Eat healthy .    6. Hematoma of skin  Large area of hematoma right upper extremity- subacute  We will check coagulation  - INR     7. Psoriasis: Hands and elbows-Improved    Surgical Procedure Risk: Intermediate (reported cardiac risk generally 1-5%)  Have you had prior anesthesia?: Yes  Have you or any family members had a previous anesthesia reaction:  No  Do you or any family members have a history of a clotting or bleeding disorder?: No  Cardiac Risk Assessment: no increased risk for major cardiac complications    Patient approved for surgery with general or local anesthesia.    Please Note:  Patient is taking medications for Chronic Pain.    Functional Status: Independent  Patient plans to recover at home with family.     Subjective:      Randi Damon is a 63 y.o. female who presents for a preoperative consultation.  Of note, her medical history is complex and significant for hereditary hemochromatosis, psoriasis-, labile mental status, morbid obesity and multi-joint DJD.  She is here today for a preoperative evaluation prior to anesthesia for left rotator cuff repair.  Of note, she is looking forward to having definitive treatment for her chronic shoulder pain.  More recently, she has been dealing with a COPD exacerbation.  This was likely triggered by seasonal allergies.  She also had a bout of bronchitis in April.  Of note, she is a non-smoker.  She uses home nebulizers and inhalers.    Her history is also significant for chronic pain, for which she is now on a fentanyl patch per the pain clinic.  She uses oral pain medications as  "well.    Currently, she does describe some dyspnea.  She attributes this to the weight gain that she has experienced.  Her cough is improved from earlier in the month.  She denies any worsening shortness of breath.  She does state that she is feeling somewhat anxious as she and her  have been having marital discord.    She denies any palpitations, anginal chest pain, new lower extremity edema.  She believes her psoriasis is with improved control.  Have a large \"bruise\" over her right arm.  She believes she pinched it on her recliner.  She is not sure.    In anesthesia history is reviewed: She denies any history of allergy or anaphylaxis to anesthesia.  However, there is a reported incidence of postoperative respiratory insufficiency with hypoxemia.  Her history is negative for primary coronary artery disease, type 2 diabetes, chronic kidney disease.  She does have chronic lung disease and is exceedingly sedentary.  Her history is positive for morbid obesity.  There is a remote history of smoking.    All other systems reviewed and are negative, other than those listed in the HPI.    Pertinent History  Do you have difficulty breathing or chest pain after walking up a flight of stairs: Yes: Worse secondary to recent weight gain.  History of obstructive sleep apnea: Yes: She is without a CPAP  Steroid use in the last 6 months: Yes: Intermittent steroid use for COPD exacerbation  Frequent Aspirin/NSAID use: Yes: Aspirin is currently on hold  Prior Blood Transfusion: No  Prior Blood Transfusion Reaction: No  If for some reason prior to, during or after the procedure, if it is medically indicated, would you be willing to have a blood transfusion?:  There is no transfusion refusal.    Current Outpatient Medications   Medication Sig Dispense Refill     albuterol (PROAIR HFA;PROVENTIL HFA;VENTOLIN HFA) 90 mcg/actuation inhaler Inhale 2 puffs every 6 (six) hours as needed for wheezing.       albuterol (PROVENTIL) 2.5 mg " /3 mL (0.083 %) nebulizer solution Take 3 mL (2.5 mg total) by nebulization every 6 (six) hours as needed for wheezing. 75 mL 12     aspirin 325 MG EC tablet Take 325 mg by mouth daily as needed for pain.       budesonide-formoterol (SYMBICORT) 160-4.5 mcg/actuation inhaler Inhale 2 puffs 2 (two) times a day. 1 Inhaler 1     cyanocobalamin, vitamin B-12, 2,500 mcg Subl Place under the tongue 2 (two) times a week.       diclofenac sodium (VOLTAREN) 1 % Gel Apply 2 g topically 2 (two) times a day as needed (pain on shoulder and ankle). 100 g 12     DULoxetine (CYMBALTA) 60 MG capsule Take 60 mg by mouth daily.         1     ergocalciferol (VITAMIN D2) 50,000 unit capsule Take 50,000 Units by mouth 2 (two) times a week.       fentaNYL (DURAGESIC) 12 mcg/hr Place 1 patch on the skin every third day. 10 patch 0     folic acid (FOLVITE) 1 MG tablet TAKE 1 TABLET (1 MG) BY ORAL ROUTE ONCE DAILY 90 tablet 3     HYDROcodone-acetaminophen 5-325 mg per tablet Take 1 tablet by mouth 4 (four) times a day as needed for pain. 100 tablet 0     levothyroxine (SYNTHROID, LEVOTHROID) 150 MCG tablet TAKE 1 TAB BY MOUTH ALTERNATING WITH 175 MCG 90 tablet 3     levothyroxine (SYNTHROID, LEVOTHROID) 175 MCG tablet TAKE 1 TAB BY MOUTH EVERY OTHER DAY ALTERNATING WITH  MCG DOSE 90 tablet 3     naloxone (NARCAN) 4 mg/actuation nasal spray 1 spray (4 mg dose) into one nostril for opioid reversal. Call 911. May repeat if no response in 3 minutes. 1 Box 0     omeprazole (PRILOSEC) 20 MG capsule TAKE 1 CAPSULE BY MOUTH 2 TIMES A  capsule 3     pramipexole (MIRAPEX) 1 MG tablet Take 0.5-1 tablets (0.5-1 mg total) by mouth 2 (two) times a day. 60 tablet 5     predniSONE (DELTASONE) 20 MG tablet Take 40 mg by mouth daily. 10 tablet 0     Current Facility-Administered Medications   Medication Dose Route Frequency Provider Last Rate Last Dose     cyanocobalamin injection 1,000 mcg  1,000 mcg Intramuscular Q30 Days Loida Stockton MD    1,000 mcg at 02/19/19 1326     Facility-Administered Medications Ordered in Other Visits   Medication Dose Route Frequency Provider Last Rate Last Dose     naloxone injection 0.2-0.4 mg (NARCAN)  0.2-0.4 mg Intravenous PRN Rob oSlorio MD        Or     naloxone injection 0.2-0.4 mg (NARCAN)  0.2-0.4 mg Intramuscular PRN Rob Solorio MD            Allergies   Allergen Reactions     Cephalexin Other (See Comments)     Muscles aches,fatigue     Levofloxacin      Reaction unknown     Penicillins      Mouth sores     Sulfa (Sulfonamide Antibiotics)      Reaction not known       Patient Active Problem List   Diagnosis     Psoriatic Arthropathy     Osteopenia     Restless Legs Syndrome     Vitamin B12 deficiency     Depression     Postablative hypothyroidism     Vitamin D Deficiency     Hemochromatosis     Impaired Fasting Glucose     History of breast cancer     Morbid Obesity     Hypertension due to endocrine disorder     Esophageal Reflux     Psoriasis     Spinal Stenosis     Benign Adenomatous Polyp Of The Large Intestine     Joint Pain, Localized In The Hip     Anxiety state, unspecified     Sacroiliitis (H)     Neck pain     COPD (chronic obstructive pulmonary disease) (H)     Sleep apnea, obstructive     Acute postoperative respiratory insufficiency     Anxiety     Hypoglycemia     Drug-induced constipation     Hereditary hemochromatosis (H)     Malignant neoplasm of left female breast, unspecified estrogen receptor status, unspecified site of breast (H)     Chronic intractable pain       Past Medical History:   Diagnosis Date     Anxiety      Breast cancer (H) 1994     Chronic pain syndrome      COPD (chronic obstructive pulmonary disease) (H)      Depression      Esophageal reflux      Graves disease      Hemochromatosis      Hx antineoplastic chemotherapy      Hx of radiation therapy      Hypertension      Impaired fasting glucose      Pheochromocytoma      Psoriasis      Psoriatic arthropathy (H)       Restless leg syndrome      Right rotator cuff tear      Sleep apnea 2017    CPAP     Spinal stenosis      Urinary incontinence      Vitamin B12 deficiency        Past Surgical History:   Procedure Laterality Date     BREAST BIOPSY       BREAST LUMPECTOMY Left 1994     JOINT REPLACEMENT       LAPAROSCOPIC ADRENALECTOMY Left 8/2/2017    Procedure: LAPAROSCOPIC LEFT ADRENALECTOMY, ;  Surgeon: Gab Linares MD;  Location: [facility];  Service:      MASTECTOMY      left lumpectomy with axillary node dissection     WY ARTHRODESIS,ANKLE,OPEN Right     Description: Ankle Arthrodesis;  Recorded: 04/07/2010;     WY MASTECTOMY, MODIFIED RADICAL      Description: Modified Radical Mastectomy Left Breast;  Recorded: 04/07/2010;     WY REMOVE TONSILS/ADENOIDS,<13 Y/O      Description: Tonsillectomy With Adenoidectomy;  Recorded: 04/07/2010;     reconstructive breast surgery Right      REDUCTION MAMMAPLASTY Right     approx late 2015/early2016     TONSILLECTOMY     Pheochromocytoma surgery    Social History     Socioeconomic History     Marital status:      Spouse name: Not on file     Number of children: Not on file     Years of education: Not on file     Highest education level: Not on file   Occupational History     Not on file   Social Needs     Financial resource strain: Not on file     Food insecurity:     Worry: Not on file     Inability: Not on file     Transportation needs:     Medical: Not on file     Non-medical: Not on file   Tobacco Use     Smoking status: Former Smoker     Last attempt to quit: 8/1/2003     Years since quitting: 15.8     Smokeless tobacco: Never Used   Substance and Sexual Activity     Alcohol use: No     Drug use: No     Sexual activity: Not on file   Lifestyle     Physical activity:     Days per week: Not on file     Minutes per session: Not on file     Stress: Not on file   Relationships     Social connections:     Talks on phone: Not on file     Gets together: Not on file      "Attends Restoration service: Not on file     Active member of club or organization: Not on file     Attends meetings of clubs or organizations: Not on file     Relationship status: Not on file     Intimate partner violence:     Fear of current or ex partner: Not on file     Emotionally abused: Not on file     Physically abused: Not on file     Forced sexual activity: Not on file   Other Topics Concern     Not on file   Social History Narrative     Not on file             Objective:     Vitals:    05/28/19 0716   BP: 138/88   Pulse: (!) 102   SpO2: 95%   Weight: (!) 260 lb (117.9 kg)   Height: 5' 6\" (1.676 m)         Physical Exam:  EYES: Eyelids, conjunctiva, and sclera were normal. Pupils were normal. Cornea, iris, and lens were normal bilaterally.  HEAD, EARS, NOSE, MOUTH, AND THROAT: Head and face were normal. Hearing was normal to voice and the ears were normal to external exam. Nose appearance was normal and there was no discharge. Oropharynx was normal.  TMs were normal.  NECK: Neck appearance was normal. There were no neck masses and the thyroid was not enlarged.  RESPIRATORY: Breathing pattern was normal and the chest moved symmetrically.   Lung sounds were slightly decreased.  There is wheezing with forced vital capacity maneuver  CARDIOVASCULAR: Heart rate and rhythm were normal.  S1 and S2 were normal and there were no extra sounds or murmurs. Peripheral pulses in arms and legs were normal.  Jugular venous pressure was normal.  There was no peripheral edema.  GASTROINTESTINAL: The abdomen was normal in contour.  Bowel sounds were present.   Palpation detected no tenderness, mass, or enlarged organs.   MUSCULOSKELETAL: Skeletal configuration was normal and muscle mass was normal for age.  There is a large area of bruising from the deltoid down and including the elbow.  No hematoma noted.  LYMPHATIC: There were no enlarged nodes.  SKIN/HAIR/NAILS: Skin color was normal.  There were lesions on the hands and " elbows consistent with psoriasis.  Hair and nails were normal.  NEUROLOGIC: The patient was alert and oriented to person, place, time, and circumstance. Speech was normal. Cranial nerves were normal. Motor strength was normal for age. The patient was normally coordinated.  PSYCHIATRIC:  Mood and affect were labile.  And the patient had normal recent and remote memory. The patient's judgment and insight were normal.          There are no Patient Instructions on file for this visit.        Labs: Chemistries are pending  Recent Results (from the past 24 hour(s))   Electrocardiogram Perform and Read    Collection Time: 05/28/19  7:47 AM   Result Value Ref Range    SYSTOLIC BLOOD PRESSURE  mmHg    DIASTOLIC BLOOD PRESSURE  mmHg    VENTRICULAR RATE 88 BPM    ATRIAL RATE 88 BPM    P-R INTERVAL 160 ms    QRS DURATION 106 ms    Q-T INTERVAL 378 ms    QTC CALCULATION (BEZET) 457 ms    P Axis 66 degrees    R AXIS 71 degrees    T AXIS 36 degrees    MUSE DIAGNOSIS       Normal sinus rhythm  Incomplete right bundle branch block  Borderline ECG  When compared with ECG of 19-FEB-2019 12:15,  No significant change was found     HM2(CBC w/o Differential)    Collection Time: 05/28/19  8:10 AM   Result Value Ref Range    WBC 5.3 4.0 - 11.0 thou/uL    RBC 4.67 3.80 - 5.40 mill/uL    Hemoglobin 16.6 (H) 12.0 - 16.0 g/dL    Hematocrit 48.4 (H) 35.0 - 47.0 %     (H) 80 - 100 fL    MCH 35.4 (H) 27.0 - 34.0 pg    MCHC 34.2 32.0 - 36.0 g/dL    RDW 10.8 (L) 11.0 - 14.5 %    Platelets 202 140 - 440 thou/uL    MPV 6.1 (L) 7.0 - 10.0 fL   INR    Collection Time: 05/28/19  8:10 AM   Result Value Ref Range    INR 0.94 0.90 - 1.10   APTT(PTT)    Collection Time: 05/28/19  8:10 AM   Result Value Ref Range    PTT 24 24 - 37 seconds       Immunization History   Administered Date(s) Administered     INFLUENZA,RECOMBINANT,INJ,PF QUADRIVALENT 18+YRS 12/19/2018     Influenza S5c3-18, 02/23/2010     Influenza, inj, historic,unspecified 12/04/2007,  12/10/2008, 12/10/2008, 02/23/2010, 09/17/2010, 09/07/2011     Influenza, seasonal,quad inj 36+ mos 10/27/2015, 09/06/2017     Influenza, seasonal,quad inj 6-35 mos 11/14/2012, 10/16/2013, 10/01/2014     Pneumo Conj 13-V (2010&after) 12/10/2014     Pneumo Polysac 23-V 03/23/2000     Tdap 05/21/2008           Electronically signed by Loida Stockton MD 05/28/19 7:21 AM

## 2021-05-29 NOTE — PROGRESS NOTES
Mental Health Visit Note    6/7/2019  Start time: 1400    Stop Time: 1450   Session # 5    Session Type: Patient is presenting for an Individual session.    Randi Damon is a 63 y.o. female is being seen today for    Chief Complaint   Patient presents with     MH Follow Up     Depression     Anxiety     PTSD   .     New symptoms or complaints: None    Functional Impairment:   Personal: 4  Family: 4  Work: 4  Social:4    Clinical assessment of mental status: Patient presented alert and oriented x3.  She was well-groomed.  Her attire was appropriate.  Patient was cooperative.  Patient motor actively was normal and eye contact appropriate.  Patients mood was anxious and affect congruent.  Patient s speech and language was rapid.  Patient attention and thought process normal.  Concentration was brief.  Patient denied delusions or hallucinations. Patient denied suicidal or homicidal ideation.  Patient denied any long or short term memory problems. No evidence of impairment in judgment.    She has insight and fund of knowledge was adequate.        Suicidal/Homicidal Ideation present: None Reported This Session    Patient's impression of their current status: Pt reports ongoing symptoms of anxiety, depression, PTSD and chronic pain that impact daily functioning.    Therapist impression of patients current state: Updated treatment plan and assessments today.  Discussed symptoms of bipolar with hx of impulsive spending (buying thousands of dollars in rugs/furniture) maxing out her credit cards.  Pt processed her struggles with impulsive shopping/spending that greatly impacts her financial stability and functioning.  Process the conflictual relationship with  and stressors within their relationships specifically their dog who has very high medical and emotional needs they can not afford.  Encouraged patient to attend psychotherapy more frequently to better address [place] plan goals.     Type of psychotherapeutic  technique provided: Insight oriented, Client centered, Solution-focused and CBT    Progress toward short term goals:Progress as expected, coping skills, impulse control, communication strategies    Review of long term goals: Treatment Plan updated    Diagnosis:   1. Severe recurrent major depression without psychotic features (H)    2. Generalized anxiety disorder    3. Chronic post-traumatic stress disorder (PTSD)    4. Chronic pain syndrome        Plan and Follow up: Follow up with Mabel in 2 weeks  Practice self-care, coping skills and communication strategies discussed  Follow up with all providers as scheduled      Discharge Criteria/Planning: Patient will continue with follow-up until therapy can be discontinued without return of signs and symptoms.    Shanice Merida 6/13/2019

## 2021-05-29 NOTE — TELEPHONE ENCOUNTER
It is not a new medication but it appears she has also been struggling with getting the medication to adhere. Lets stop the medication all together cxl any outstanding scripts at the pharmacy. She needs to be seen to help with deciding on making changes if she feels the norco is not helping.

## 2021-05-29 NOTE — TELEPHONE ENCOUNTER
FYI - Status Update  Who is Calling: Patient  Update: Patient had an appointment with Dr. Ortega today but stated I am really not feeling any better then I felt with my pre-op. Due to my COPD issues I feel it would be better to stay home today with the heat. Patient denied triage and stated I am fine, I am using my nebulizer and I am rescheduling my follow up for Monday with Dr. Ortega    Okay to leave a detailed message?:  Yes

## 2021-05-29 NOTE — TELEPHONE ENCOUNTER
Spoke with pt and relayed PCP message.  Pt understanding and has already cancelled her surgery. Pt picked up a prescription for her Mirapex today.

## 2021-05-30 ENCOUNTER — RECORDS - HEALTHEAST (OUTPATIENT)
Dept: ADMINISTRATIVE | Facility: CLINIC | Age: 66
End: 2021-05-30

## 2021-05-30 VITALS — WEIGHT: 232 LBS | BODY MASS INDEX: 37.45 KG/M2

## 2021-05-30 VITALS — BODY MASS INDEX: 36.64 KG/M2 | HEIGHT: 66 IN | WEIGHT: 228 LBS

## 2021-05-30 VITALS — BODY MASS INDEX: 37.24 KG/M2 | WEIGHT: 230.7 LBS

## 2021-05-30 VITALS — BODY MASS INDEX: 37.57 KG/M2 | WEIGHT: 233.8 LBS | HEIGHT: 66 IN

## 2021-05-30 VITALS — WEIGHT: 232.5 LBS | HEIGHT: 66 IN | BODY MASS INDEX: 37.37 KG/M2

## 2021-05-30 VITALS — WEIGHT: 229 LBS | BODY MASS INDEX: 36.96 KG/M2

## 2021-05-30 VITALS — WEIGHT: 230 LBS | BODY MASS INDEX: 36.96 KG/M2 | HEIGHT: 66 IN

## 2021-05-30 VITALS — BODY MASS INDEX: 36.8 KG/M2 | HEIGHT: 66 IN | WEIGHT: 229 LBS

## 2021-05-30 NOTE — TELEPHONE ENCOUNTER
Completed Please fill 07/14/2019 for use 07/15-08/12  Requested Prescriptions     Signed Prescriptions Disp Refills     HYDROcodone-acetaminophen 5-325 mg per tablet 112 tablet 0     Sig: Take 1 tablet by mouth 4 (four) times a day as needed for pain.     Authorizing Provider: HUMAIRA HANSEN

## 2021-05-30 NOTE — TELEPHONE ENCOUNTER
Prescriptions have been cancelled at pharmacy. I did leave a message for patient to call the clinic to try and get an earlier appointment than the one she has scheduled for August. Patient was at the ED yesterday

## 2021-05-30 NOTE — TELEPHONE ENCOUNTER
Completed script for dose increase to 6/day x 14 days  Requested Prescriptions     Signed Prescriptions Disp Refills     HYDROcodone-acetaminophen 5-325 mg per tablet 84 tablet 0     Sig: Take 1 tablet by mouth every 4 (four) hours as needed for pain.     Authorizing Provider: HUMAIRA HANSEN

## 2021-05-30 NOTE — PROGRESS NOTES
Patient is here for a follow up with Anne-Marie Little CNP for her shoulder and neck pain          *Universal Precautions:    UDT/SWAB- 04/14/2018  Consent/Agreement- 06/06/2019  Pharmacy- as documented    Count- n/a   Psychological evaluation last OV 06/07/2019 with Mabel Merida  6/15/18 MME=50  02/15/19 MME= 45  03/19/19 MME- 50  04/04/19 MME-50  06/06/2019 ME- 50  07/23/2019 MME- 30  Pharmacogenetic testing- n/a  MTM: n/a  Naloxone safety: 02/15/2019  Medical cannabis: email reviewed 4/4/19

## 2021-05-30 NOTE — TELEPHONE ENCOUNTER
Question following Office Visit  When did you see your provider: 6/27/19  What is your question:   1) Patient is asking why she was set-up for [facility] instead of Dr. Nelson at the [facility]. Patient is not scheduled to see Pulmonology until August. Unable to contact [facility]  since she is on vacation this week. [facility] given [facility] at [phone].  2) Patient is trying to determine if she needs to re-schedule with [facility]. Patient canceled 6/26 appointment to see [facility] since she was scheduled to see Dr. Stockton, please advise.  Okay to leave a detailed message: Yes [phone]

## 2021-05-30 NOTE — PROGRESS NOTES
"Mental Health Visit Note    7/23/2019 Start time: 1400    Stop Time: 1450   Session # 6    Session Type: Patient is presenting for an Individual session.    Randi Damon is a 63 y.o. female is being seen today for    Chief Complaint   Patient presents with      Follow Up     Depression     Anxiety   .     New symptoms or complaints: Pt reports use of alcohol over the past month.    Functional Impairment:   Personal: 4  Family: 4  Work: 4   Social: 4    Clinical assessment of mental status: Patient presented alert and oriented x3.  She was well-groomed.  Her attire was appropriate.  Patient was cooperative.  Patient motor actively was normal and eye contact appropriate.  Patients mood was anxious and affect congruent.  Patient s speech and language was normal.  Patient attention and thought process normal.  Concentration was appropriate.  Patient denied delusions or hallucinations. Patient denied suicidal or homicidal ideation.  Patient denied any long or short term memory problems. No evidence of impairment in judgment.   She has insight and fund of knowledge was adequate.        Suicidal/Homicidal Ideation present: None Reported This Session    Patient's impression of their current status: Pt was recommended to follow up with me today by her Pain Center Provider as she reported regular alcohol use within the past 3-4 weeks.     Therapist impression of patients current state: Pt reports she shared with pain center provider that she was activity drinking and knew it would show up on her UDS.  Pt reports 20 years of sobriety from alcohol with 1 previous self-admitted treatment program in the past with hx of involvement in AA.  Pt reports that she feels her relapse probably started around shabnam but has been fairly regularly drinking wine for the past 3-4 weeks.  Pt began to minimize use as stating \"It was just wine\", but later was able to understand the statement as being part of the denial/relapse process. " " Pt processed how she feels the relapse started many months ago with use of \"near beer\" with a friend.  Discussed multiple psychosocial stressors with her self & her .   does not support her drinking and is aware of the concerns.   We discussed her hx of alcohol use and how it negatively impacted her life.  Pt agreed to follow up with therapist weekly and also look into returning to AA for additional support.  Pt reports she does not have alcohol in the home.      Type of psychotherapeutic technique provided: Insight oriented, Client centered, Solution-focused and CBT    Progress toward short term goals:Progress as expected, recovery and relapse education    Review of long term goals: Date of last review 6/7/2019    Diagnosis:   1. Bipolar affective disorder, currently depressed, moderate (H)    2. Generalized anxiety disorder    3. Chronic post-traumatic stress disorder (PTSD)        Plan and Follow up: Follow up with Mabel in 1 week  Look into AA meetings to return to this week  Follow up with all providers as scheduled  Avoid use of alcohol        Discharge Criteria/Planning: Patient will continue with follow-up until therapy can be discontinued without return of signs and symptoms.    Shanice Merida 7/29/2019  "

## 2021-05-30 NOTE — TELEPHONE ENCOUNTER
Upcoming Appointment Question  When is the appointment: to schedule the soonest available with PCP  What is your appointment for?: I was seen in ER, was Dx with pulmonary hypertension and was advised to this rapid access appointment but this isn't until Friday and I would like to see Loida Stockton MD to discuss this and a cardiologist prior to this.     Who is your appointment scheduled with?: TBA patient was transferred to schedule   What is your question/concern?: Please allow me to be seen within a day or so by Loida Stockton MD Please work me in if nothing is open.  Please return call to patient to discuss.   Okay to leave a detailed message?: Yes

## 2021-05-30 NOTE — TELEPHONE ENCOUNTER
Called pt and she thinks she confused Charlene.    I will fax info to Cardiology at [facility] for them to review per pt request and they will call her to schedule appt.  Pt agreed with plan and will let us know if she needs anything else.     Also advised that pulm was fine for Aug appt per Dr Stockton note below.    No further action needed at this time.

## 2021-05-30 NOTE — PROGRESS NOTES
July 30th, 2019          Randi Damon  [address] [place] [address]          Dear Randi :    We missed you! You recently missed your scheduled appointment with ASHKAN Brandon, [facility] on July 30th, 2019 without calling us 24 hours before to cancel your appointment.     If you miss three scheduled appointments (late cancels), you will no longer be able to make appointments in advance. You will only be able to make appointments the same day, if one is available. If you have three no shows you will be discharged from psychotherapy services and not allowed to schedule. You have an appointment scheduled on August 8th, 2019 please make it your top priority to attend.     We are committed to partnering with you to meet your health care needs and hope that we can continue to serve you.  If you have questions please call the clinic at [phone].        Sincerely,  Clinic Director

## 2021-05-30 NOTE — TELEPHONE ENCOUNTER
Pt called roma about levothyroxine medication.  [facility] pharmacy charges her more for 45 pills than for 90 pills.  Pt asked if PCP could send in a script for 90 pills as this is more cost effective.

## 2021-05-30 NOTE — TELEPHONE ENCOUNTER
Call from pt stating pain has been bad since stopping Fentanyl patches, Hydrocodone was meant to augment Fentanyl, and she needs something more. Follow-up scheduled 07/23. Asked her if she's overused Hydrocodone, and states she doesn't know. Withdrawal meds offered; pt declined at this time. Asked that she call back if needed for withdrawal symptoms. States she'll try not to overuse her meds. She is on the cancellation list.

## 2021-05-30 NOTE — TELEPHONE ENCOUNTER
Put in referrals to both cardiology and pulmonary.  At the end of the day, she has underlying lung disease.  It is fine to see the pulmonologist in August.  She is very welcome to see Dr. Edwards as we discussed.  I did put the order in for that.

## 2021-05-30 NOTE — PATIENT INSTRUCTIONS - HE
Plan:   Plan of Care / NextSteps:     Follow up by: 2 weeks      Education:   Please call Monday-Friday for problems or questions and one of the clinical support staff (CSS) will help to get things figured out. The number is [phone].     [employer] is a means to send an e-mail ([employer] message) to communicate any concerns.     Please remember some issues require an office visit.     Today we reviewed the plan of care and answered questions.      Records: Reviewed to assist with preparation for the office visit and are reflected throughout the note.    Primary Care: You need to have an annual physical and check in with your primary care folks on a regular basis. Talk with your primary care about the frequency of expected visits.     Behavioral Medicine: Appt with Mabel today at 2pm.    You have agreed to stop any alcohol as of today    Interventional: we discussed an injection for your neck there is an order so you can schedule    Due to concerns about blunting of the immune response it is recommended there be a two week berth between the flu vaccine and injections.    Integrative Approaches: Acupuncture may be very supportive for you mood and pain and sleep     Consultation/Specialists: I would like you to get back to the pulmonary folks    Diagnostics:   UDT/SWAB collected 7/23/19 results are pending. I know there hydrocodone and alcohol.   UDT/SWAB:  Patient required a random Urine Drug Testing, due to the need to comply with [place] Model Policy Guidelines and CDC Guideline for the use of any controlled substances. This is to ensure that patient is compliant with treatment, and monitor for risks such as diversion, abuse, or any other aberrant behaviors. Patient is either being considered for or taking a controlled substance. Unexpected findings will be discussed and treatment decision may be adjusted. Testing is being implemented across the board randomly w/o bias related to age, race, gender, socioeconomic  status or [employer]ondoism affiliation.     Medication prescribed / to be continued:   Medication prescribed today: - will discuss with Mabel after your appointment. You still have some medication. You have medication until 7/25/19

## 2021-05-30 NOTE — TELEPHONE ENCOUNTER
Concerns are dating back to 6/24/19 reviewed the telephone note and the telephone note 6/26/19    The MME with the fentanyl was = 50 w/o it is = to 20    Plan:  I think it would be reasonable to have the pt bump up the hydrocodone to 6/day (MME=30) until her next visit. Please assist I expect she will need a bridge given the increased Please cue.

## 2021-05-30 NOTE — TELEPHONE ENCOUNTER
If available, a longer appointment is always better with Winnie.  She has multiple issues to discuss.

## 2021-05-30 NOTE — TELEPHONE ENCOUNTER
Spoke with the pt by phone today reviewed appt with Mabel and follow up as well as the UDT and findings. We discussed holding off on the medical cannabis for at this time. Discussed she has medication 7/28/19 (has 3 for the day) sending a 14 day supply    Completed 7/28-8/11  Requested Prescriptions     Signed Prescriptions Disp Refills     HYDROcodone-acetaminophen 5-325 mg per tablet 84 tablet 0     Sig: Take 1 tablet by mouth every 4 (four) hours as needed for pain.     Authorizing Provider: HUMAIRA HANSEN

## 2021-05-30 NOTE — TELEPHONE ENCOUNTER
Called and spoke to patient. She counted her tablets and has 11 left. At 4 per day she will have medication through 07/12/2019. She will need a refill tomorrow 07/11/2019 due to the increase. I advised the patient she can start with 6 per day today. She does have a script at the pharmacy to fill 07/14/19 to start 07/15-08/12. I have cancelled that script and cued up a new script to reflect the new dosing of 6 per day. Please make any changes you feel appropriate

## 2021-05-30 NOTE — PROGRESS NOTES
[facility]- Emergency Department Follow Up Note    Assessment/Plan:  1. Shortness of breath  Continues with significant expiratory wheezes and dyspnea.  Did not take prednisone for COPD exacerbation as prescribed last visit.  Seen in the ED with prominent pulmonary arteries on CT examination.  Recommendation: We will proceed with evaluation for pulmonary hypertension.  However, in the interim, she needs to take her prednisone for COPD exacerbation, be compliant with inhalers.  Avoid pollens.  She has not been using her air conditioner as they cannot afford to have it fixed.  She is outdoors planting and spending time with her pets that she is allergic to.  Diagnostics as below.  She would like to see a Dr. Chris Edwards as she has researched him and believes that he is an expert on pulmonary hypertension.    - Echo Complete; Future  - PFT Complete; Future  - Ambulatory referral to Cardiology  - Ambulatory referral to Pulmonology  - albuterol nebulizer solution 2.5 mg (PROVENTIL)    Addendum: Lung examination following albuterol nebulizer reveals improved aeration.  Expiratory wheezing is still noted but overall, improved.  Patient feels better.    2. Chronic obstructive pulmonary disease with acute exacerbation (H)  With exacerbation.  Oxygen levels acceptable but significant wheezing noted on examination.  Evaluation as below  - PFT Complete; Future  - Ambulatory referral to Pulmonology  - albuterol nebulizer solution 2.5 mg (PROVENTIL)  - albuterol (PROAIR HFA;PROVENTIL HFA;VENTOLIN HFA) 90 mcg/actuation inhaler; Inhale 2 puffs every 6 (six) hours as needed for wheezing.  Dispense: 3 Inhaler; Refill: 3  - budesonide-formoterol (SYMBICORT) 160-4.5 mcg/actuation inhaler; Inhale 2 puffs 2 (two) times a day.  Dispense: 3 Inhaler; Refill: 3    3. Pulmonary hypertension (H)  She has obstructive sleep apnea and does not use CPAP, has COPD with exacerbation, will rule out cardiac issue  - Echo Complete;  Future  - PFT Complete; Future  - Ambulatory referral to Cardiology  - Ambulatory referral to Pulmonology    4. Localized edema  Using triamterene hydrochlorothiazide as needed  - triamterene-hydrochlorothiazide (MAXZIDE-25) 37.5-25 mg per tablet; Take 1 tablet by mouth daily.  Dispense: 30 tablet; Refill: 11    5. KYAW (obstructive sleep apnea)  Would like to reevaluate sleep apnea equipment  - Ambulatory referral to Pulmonology    6. Coronary artery calcification  With LDL cholesterol in the 120s.  Will prescribe Lipitor to start.  Will be mindful of myalgias given that she has chronic pain  - atorvastatin (LIPITOR) 10 MG tablet; Take 1 tablet (10 mg total) by mouth daily.  Dispense: 90 tablet; Refill: 11          Loida Stockton MD    Chief Complaint:  Chief Complaint   Patient presents with     Follow-up       History of Present Illness:  Randi is a 63 y.o. female who is here today for emergency room follow-up with regard to COPD exacerbation.  Of note, she was seen here on the 19th with significant expiratory wheezes.  She had been gardening, and had been exposed to a great deal of irritants.  These have bothered her in the past.  She was given prescriptions that she did not fill as she could not afford it.  When her symptoms escalated, she presented to the emergency room where she received a very thorough evaluation.  The results are reviewed with her today.  She had a CT angios which was negative for pulmonary embolism or pneumonia.  She did have prominent pulmonary arteries on examination.  She had normal BNP and laboratory studies.  She was given the option to be admitted for further evaluation.  She declined.  She is here today for follow-up.    She did go ahead and refill her prescriptions for COPD exacerbation but has not yet taken them.  She describes wheezing that does improve with nebulizer.    In addition to prominent pulmonary arteries, she has a long-standing history of COPD, environmental  "allergies, obstructive sleep apnea-untreated.  Her latest pulmonary functions were several years ago.  She is intermittently compliant with medications mostly due to financial reasons.    She has done her own \"research \"she would like to see a [place] cardiologist who has an interest in pulmonary hypertension.    Review of Systems:  A comprehensive review of systems was performed and was otherwise negative    PFSH:  Social History: She is  without children.  Social History     Tobacco Use   Smoking Status Former Smoker     Last attempt to quit: 8/1/2003     Years since quitting: 15.9   Smokeless Tobacco Never Used       Past History:   Past Medical History:   Diagnosis Date     Anxiety      Breast cancer (H) 1994     Chronic pain syndrome      COPD (chronic obstructive pulmonary disease) (H)      Depression      Esophageal reflux      Graves disease      Hemochromatosis      Hx antineoplastic chemotherapy      Hx of radiation therapy      Hypertension      Impaired fasting glucose      Pheochromocytoma      Psoriasis      Psoriatic arthropathy (H)      Restless leg syndrome      Right rotator cuff tear      Sleep apnea 2017    CPAP     Spinal stenosis      Urinary incontinence      Vitamin B12 deficiency        Current Outpatient Medications   Medication Sig Dispense Refill     albuterol (PROAIR HFA;PROVENTIL HFA;VENTOLIN HFA) 90 mcg/actuation inhaler Inhale 2 puffs every 6 (six) hours as needed for wheezing. 3 Inhaler 3     albuterol (PROVENTIL) 2.5 mg /3 mL (0.083 %) nebulizer solution Take 3 mL (2.5 mg total) by nebulization every 6 (six) hours as needed for wheezing. 75 mL 12     aspirin 325 MG EC tablet Take 325 mg by mouth daily as needed for pain.       budesonide-formoterol (SYMBICORT) 160-4.5 mcg/actuation inhaler Inhale 2 puffs 2 (two) times a day. 3 Inhaler 3     cyanocobalamin, vitamin B-12, 2,500 mcg Subl Place under the tongue 2 (two) times a week.       diclofenac sodium (VOLTAREN) 1 % Gel " Apply 2 g topically 2 (two) times a day as needed (pain on shoulder and ankle). 100 g 12     DULoxetine (CYMBALTA) 60 MG capsule Take 1 capsule (60 mg total) by mouth daily. 30 capsule 1     ergocalciferol (VITAMIN D2) 50,000 unit capsule Take 50,000 Units by mouth 2 (two) times a week.       folic acid (FOLVITE) 1 MG tablet TAKE 1 TABLET (1 MG) BY ORAL ROUTE ONCE DAILY 90 tablet 3     [START ON 7/12/2019] HYDROcodone-acetaminophen 5-325 mg per tablet Take 1 tablet by mouth 4 (four) times a day as needed for pain. 120 tablet 0     levothyroxine (SYNTHROID, LEVOTHROID) 150 MCG tablet TAKE 1 TAB BY MOUTH ALTERNATING WITH 175 MCG 30 tablet 0     levothyroxine (SYNTHROID, LEVOTHROID) 175 MCG tablet TAKE 1 TAB BY MOUTH EVERY OTHER DAY ALTERNATING WITH  MCG DOSE 30 tablet 0     naloxone (NARCAN) 4 mg/actuation nasal spray 1 spray (4 mg dose) into one nostril for opioid reversal. Call 911. May repeat if no response in 3 minutes. 1 Box 0     omeprazole (PRILOSEC) 20 MG capsule TAKE 1 CAPSULE BY MOUTH 2 TIMES A DAY 60 capsule 0     pramipexole (MIRAPEX) 1 MG tablet Take 0.5-1 tablets (0.5-1 mg total) by mouth 2 (two) times a day. 60 tablet 5     atorvastatin (LIPITOR) 10 MG tablet Take 1 tablet (10 mg total) by mouth daily. 90 tablet 11     doxycycline (VIBRA-TABS) 100 MG tablet Take 1 tablet (100 mg total) by mouth 2 (two) times a day for 10 days. 20 tablet 0     triamterene-hydrochlorothiazide (MAXZIDE-25) 37.5-25 mg per tablet Take 1 tablet by mouth daily. 30 tablet 11     Current Facility-Administered Medications   Medication Dose Route Frequency Provider Last Rate Last Dose     albuterol nebulizer solution 2.5 mg (PROVENTIL)  2.5 mg Inhalation Once Loida Stockton MD         cyanocobalamin injection 1,000 mcg  1,000 mcg Intramuscular Q30 Days Loida Stockton MD   1,000 mcg at 05/28/19 0912       Family History: Hereditary hemochromatosis    Physical Exam:  General Appearance:   She is tangential in  conversation.  She does not appear acutely in distress.  She does have wheezing with exertion  Vitals:    06/27/19 1035 06/27/19 1038   BP: 128/82    Patient Site: Left Arm    Patient Position: Sitting    Cuff Size: Adult Large    Pulse: (!) 102    SpO2: 90% 96%   Weight: (!) 258 lb (117 kg)      Wt Readings from Last 3 Encounters:   06/27/19 (!) 258 lb (117 kg)   06/24/19 (!) 259 lb (117.5 kg)   06/24/19 (!) 259 lb (117.5 kg)     Body mass index is 41.64 kg/m .    Head neck exam is negative  Diffuse expiratory wheezes heard throughout  Mild tachycardia noted  Extremities have 1-2+ edema and varicosities    Data Review:    Analysis and Summary of Old Records (2): Reviewed emergency department records    Records Requested (1):       Other History Summarized (from other people in the room) (2):     Radiology Tests Summarized (XRAY/CT/MRI/DXA) (1): Viewed CT scan    Labs Reviewed (1): Reviewed laboratory studies    Medicine Tests Reviewed (EKG/ECHO/COLONOSCOPY/EGD) (1):     Independent Review of EKG or X-RAY (2):

## 2021-05-30 NOTE — PROGRESS NOTES
Post Discharge Medication Reconciliation Status: discharge medications reconciled and changed, per note/orders (see AVS)

## 2021-05-31 ENCOUNTER — RECORDS - HEALTHEAST (OUTPATIENT)
Dept: ADMINISTRATIVE | Facility: CLINIC | Age: 66
End: 2021-05-31

## 2021-05-31 VITALS — HEIGHT: 66 IN | BODY MASS INDEX: 36.64 KG/M2 | WEIGHT: 228 LBS

## 2021-05-31 VITALS — WEIGHT: 226 LBS | HEIGHT: 66 IN | BODY MASS INDEX: 36.32 KG/M2

## 2021-05-31 VITALS — BODY MASS INDEX: 36.96 KG/M2 | WEIGHT: 230 LBS | HEIGHT: 66 IN

## 2021-05-31 VITALS — HEIGHT: 66 IN | WEIGHT: 229 LBS | BODY MASS INDEX: 36.8 KG/M2

## 2021-05-31 VITALS — WEIGHT: 224 LBS | BODY MASS INDEX: 36 KG/M2 | HEIGHT: 66 IN

## 2021-05-31 VITALS — BODY MASS INDEX: 37.49 KG/M2 | WEIGHT: 225 LBS | HEIGHT: 65 IN

## 2021-05-31 VITALS — BODY MASS INDEX: 38.25 KG/M2 | WEIGHT: 237 LBS

## 2021-05-31 VITALS — BODY MASS INDEX: 36.48 KG/M2 | WEIGHT: 226 LBS

## 2021-05-31 VITALS — HEIGHT: 65 IN | WEIGHT: 232 LBS | BODY MASS INDEX: 38.65 KG/M2

## 2021-05-31 VITALS — HEIGHT: 66 IN | WEIGHT: 232.6 LBS | BODY MASS INDEX: 37.38 KG/M2

## 2021-05-31 VITALS — HEIGHT: 66 IN | WEIGHT: 226 LBS | BODY MASS INDEX: 36.32 KG/M2

## 2021-05-31 VITALS — BODY MASS INDEX: 36.64 KG/M2 | WEIGHT: 228 LBS | HEIGHT: 66 IN

## 2021-05-31 VITALS — HEIGHT: 66 IN | WEIGHT: 234.6 LBS | BODY MASS INDEX: 37.7 KG/M2

## 2021-05-31 VITALS — BODY MASS INDEX: 35.91 KG/M2 | WEIGHT: 222.5 LBS

## 2021-05-31 VITALS — BODY MASS INDEX: 36.96 KG/M2 | WEIGHT: 229 LBS

## 2021-05-31 VITALS — BODY MASS INDEX: 36.32 KG/M2 | HEIGHT: 66 IN | WEIGHT: 226 LBS

## 2021-05-31 NOTE — PATIENT INSTRUCTIONS - HE
It was a pleasure to see you today.    Your shortness of breath is due to mild COPD, obesity and deconditioning     Continue the symbicort for now. I refilled the albuterol. We may change the symbicort when that runs out if it is not helpful.    The sleep clinic follow-up is very important.     You have a tiny lung nodule that has been stable for over 2 years and does not require further follow-up.    I will see you back in 2 months.        Reina Morillo MD  Pulmonary and Critical Care Medicine  [facility]  Office: [phone]  Pager: [phone]

## 2021-05-31 NOTE — TELEPHONE ENCOUNTER
Prescription was sent to local pharmacy instead of mail order, please advise on correct pharmacy.

## 2021-05-31 NOTE — TELEPHONE ENCOUNTER
Let her know that her thyroid is a bit off, but I am assuming that she missed some doses. We will recheck in 2-3 months.

## 2021-05-31 NOTE — TELEPHONE ENCOUNTER
Central PA team  [phone]  Pool: HE PA MED ([id number])          PA has been initiated.       PA form completed and faxed insurance via Cover My Meds     Key:  [id number] - PA Case ID: [id number]     Medication:  Butrans 10MCG/HR weekly patches    Insurance:  [employer]        Response will be received via fax and may take up to 5-10 business days depending on plan

## 2021-05-31 NOTE — TELEPHONE ENCOUNTER
Fax received from [facility] pharmacy requesting Prior Authorization    Medication Name:   folic acid (FOLVITE) 1 MG tablet 90 tablet 3 7/25/2019     Sig - Route: Take 1 tablet (1 mg total) by mouth daily. - Oral    Sent to pharmacy as: folic acid (FOLVITE) 1 MG tablet    E-Prescribing Status: Receipt confirmed by pharmacy (7/25/2019  8:58 AM CDT)          Insurance Plan: Medicare Part D   PBM: Clara   Patient ID Number: [id number]    Please start PA process

## 2021-05-31 NOTE — TELEPHONE ENCOUNTER
Prior Authorization Request  Who s requesting: Anne-Marie Little CNP/ Reina Kimball CMA  Pharmacy Name and Location: [facility]  Phone [phone]  Fax [phone]  [place] [facility]  Insurance Plan: Medicare  Insurance Member ID Number:  [id number]  Informed patient that prior authorizations can take up to 10 business days for response:   Yes  Okay to leave a detailed message: Yes

## 2021-05-31 NOTE — TELEPHONE ENCOUNTER
Yes, she should take as we discussed.  She can take it at bedtime, if she can be without food 2-3 hours before.

## 2021-05-31 NOTE — TELEPHONE ENCOUNTER
Pt states that she has been taking every dose   Pt is wanting to know if she should take it in the middle of the night when she has a empty stomach, because 4 hours to wait in the morning she needs to take her medications in the morning   Should pt still be taking her levothyroxine 6 days out of the week    Please sign rx for short term until she recieves her mail order supply    She is okay with getting a [employer] message

## 2021-05-31 NOTE — TELEPHONE ENCOUNTER
Situation:  Pt with alcohol relapse. Interested in medical cannabis and reports her partner was interested in the medica cannabis given the risk of opioids and alcohol. UDT negative for alcohol. It is noted she will binge. She is currently not drinking. She has been seen by . Is intending to reconnect in a more deliberate fashion with AA. She was shifted over to butrans.    Discussion/Plan:  Butrans is not safe. The pt should be d/c from all opioid medication at this time to prevent any issues early in recovery. Medical cannabis is not recommended.      TC placed to the pt to discuss the outcome of Case Review. V.M. Message was left.

## 2021-05-31 NOTE — PROGRESS NOTES
Patient is here for a follow up with Anne-Marie Little CNP for her shoulder and neck pain        *Universal Precautions:    UDT/SWAB- 07/23/2019  Consent/Agreement- 06/06/2019  Pharmacy- as documented    Count- n/a   Psychological evaluation last OV 08/08/2019 with Mabel Merida  6/15/18 MME=50  02/15/19 MME= 45  03/19/19 MME- 50  04/04/19 MME-50  06/06/2019 ME- 50  07/23/2019 MME- 30  08/15/2019 MME-30  Pharmacogenetic testing- n/a  MTM: n/a  Naloxone safety: 02/15/2019  Medical cannabis: email reviewed 4/4/19

## 2021-05-31 NOTE — TELEPHONE ENCOUNTER
----- Message from Loida Stockton MD sent at 8/2/2019  2:57 PM CDT -----  Please contact [facility] pharmacy and tell them that the Synthroid prescription is in error

## 2021-05-31 NOTE — PROGRESS NOTES
Pulmonary Clinic Outpatient Consultation    Assessment and Plan:   This is a 64 y F with depression/anxiety, alcohol abuse, severe sleep apnea, and possible mild OHS not on CPAP, morbid obesity (BMI 42), 44+ pack year tobacco history in remission, COPD with mild osbtruction, GERD and hiatal hernia, chronic pain on opiods, hypothyroid after tx for Graves, pheochromocytoma resected ~2 years ago, who presents for an evaluation of COPD and exertional dyspnea.       PLAN by Issue:    1. COPD, GOLD A-B, mild obstruction  Has had issues with cost of inhalers. Currently on symbicort. No clear indication for inhaled steroid in her case, will not make changes at the present.    Continue symbicort - may change this to LAMA/LABA when she uses up current supply     Albuterol was re-ordered    She has a 44+ pack year smoking history, quit 20 years ago.     She is up to date on vaccinations    2. Pulmonary Nodule  4 mm, LLL. Stable since 4/2017, >2 years    No further scans indicated    She is not a candidate for lung cancer screening scans    3. Pulmonary Hypertension  Seen at the [facility] clinic. Likely mild if present, unable to estimate RVSP and in her case likely to be group 2, 3 (?HFpEF), chronic lung disease, untreated severe sleep apnea and possible OHS.    She was referred to sleep/PSG already    Has a FH of connective tissue disease (SLE, scleroderma)    She is seeing the  in follow-up next month    4. Exertional Dyspnea  Due to COPD, morbid obesity, deconditioning.    Adjusting inhaler as above    Has many chronic issues with depression, chronic pain, ideally needs aggressive weight loss plan.      I will see her back in 2 months.      Reina Morillo MD  Pulmonary and Critical Care Medicine  [facility]  Office: [phone]  Pager: [phone]    ----------------------    Reason for Consult: COPD, shortness of breath     HPI:   This is a 64 y F with depression/anxiety, alcohol abuse, severe sleep apnea, not  on CPAP, morbid obesity (BMI 42), 44+ pack year tobacco history in remission, COPD, GERD and hiatal hernia, chronic pain on opiods, hypothyroid after tx for Graves, pheochromocytoma resected ~2 years ago, who presents for an evaluation of COPD and exertional dyspnea.     She was very emotionally labile, tangential, and difficult to obtain a history from during our visit.     She reports she has had long standing ANNA. It has gotten worse over time, which she correlates with a 60+ lbs weight gain over the last 2 years. She has a chronic cough productive of clear phlegm. She notes significant depressive symptoms but no SI, feels no energy or motivation. She reports a recent relapse of alcohol abuse, but tells me she has been sober for 1 month however reported at [facility] visit 1 week ago she was drinking 2 bottles of wine daily.    She has severe KYAW per PSG but has not been using CPAP. She was referred to sleep again and has an appointment next month.    For her COPD she hs been on Symbicort intermittently due to cost, she just picked up another supply and has been back on for ~1 month. She is uncertain if it helps. She ran out of albuterol last month. CAT 31 today. Not certain of her last exacerbation. She quit smoking ~20 years ago.    She recently saw Dr. Edwards at [facility] for a pulmonary HTN evaluation. She had an enlarged PA on a CT PE. The echo done there was unable to estimate PA pressure or diastolic function. It showed a mild reduction in RV function and was otherwise normal. She was referred for a sleep study and to hepatology for a history of possible hereditary hemochromatosis.       ROS:  A 12-system review was obtained and was negative with the exception of the symptoms endorsed in the history of present illness.    PMH:  Past Medical History:   Diagnosis Date     Anxiety      Breast cancer (H) 1994     Chronic pain syndrome      COPD (chronic obstructive pulmonary disease) (H)      Depression       Esophageal reflux      Graves disease      Hemochromatosis      Hx antineoplastic chemotherapy      Hx of radiation therapy      Hypertension      Impaired fasting glucose      Pheochromocytoma      Psoriasis      Psoriatic arthropathy (H)      Restless leg syndrome      Right rotator cuff tear      Sleep apnea 2017    CPAP     Spinal stenosis      Urinary incontinence      Vitamin B12 deficiency      PSH:  Past Surgical History:   Procedure Laterality Date     Adrenalectomy for pheochromocytoma       BREAST BIOPSY       BREAST LUMPECTOMY Left 1994     JOINT REPLACEMENT       LAPAROSCOPIC ADRENALECTOMY Left 8/2/2017    Procedure: LAPAROSCOPIC LEFT ADRENALECTOMY, ;  Surgeon: Gab Linares MD;  Location: [facility];  Service:      MASTECTOMY      left lumpectomy with axillary node dissection     CA ARTHRODESIS,ANKLE,OPEN Right     Description: Ankle Arthrodesis;  Recorded: 04/07/2010;     CA MASTECTOMY, MODIFIED RADICAL      Description: Modified Radical Mastectomy Left Breast;  Recorded: 04/07/2010;     CA REMOVE TONSILS/ADENOIDS,<11 Y/O      Description: Tonsillectomy With Adenoidectomy;  Recorded: 04/07/2010;     reconstructive breast surgery Right      REDUCTION MAMMAPLASTY Right     approx late 2015/early2016     TONSILLECTOMY       Allergies:  Allergies   Allergen Reactions     Cephalexin Other (See Comments)     Muscles aches,fatigue     Gabapentin      Drove on the wrong side of the highway     Levofloxacin      Reaction unknown     Penicillins      Mouth sores     Sulfa (Sulfonamide Antibiotics)      Reaction not known     Family HX:  Family History   Problem Relation Age of Onset     Heart disease Father      Obesity Father      Hemochromatosis Father      Obesity Mother      Arthritis Mother      Obesity Sister      Cardiovascular Sister      Hypertension Sister      Arthritis Sister      Hemochromatosis Sister      Lupus Sister      Scleroderma Sister      Cardiovascular Brother      Hypertension  Brother      Arthritis Brother      Hemochromatosis Brother      Prostate cancer Brother      Cardiovascular Maternal Grandmother      Stroke Paternal Grandmother      Breast cancer Neg Hx      Social Hx:  Social History     Socioeconomic History     Marital status:      Spouse name: Not on file     Number of children: Not on file     Years of education: Not on file     Highest education level: Not on file   Occupational History     Not on file   Social Needs     Financial resource strain: Not on file     Food insecurity:     Worry: Not on file     Inability: Not on file     Transportation needs:     Medical: Not on file     Non-medical: Not on file   Tobacco Use     Smoking status: Former Smoker     Last attempt to quit: 2003     Years since quittin.0     Smokeless tobacco: Never Used   Substance and Sexual Activity     Alcohol use: No     Drug use: No     Sexual activity: Not on file   Lifestyle     Physical activity:     Days per week: Not on file     Minutes per session: Not on file     Stress: Not on file   Relationships     Social connections:     Talks on phone: Not on file     Gets together: Not on file     Attends Baptism service: Not on file     Active member of club or organization: Not on file     Attends meetings of clubs or organizations: Not on file     Relationship status: Not on file     Intimate partner violence:     Fear of current or ex partner: Not on file     Emotionally abused: Not on file     Physically abused: Not on file     Forced sexual activity: Not on file   Other Topics Concern     Not on file   Social History Narrative     Not on file   Tobacco: 2-3 PPD for 22 years, quit >20 years ago  Prior drug abuse, EtOH dependency, had been sober 20 years now relapsed, nothing in the last 1 month  Disabled, then retired, worked in   Currently lives in [place] with   1 dog, 1 cat   No travel in 2 years    Physical Exam:  /78   Pulse 100   Resp 20    "Ht 5' 6\" (1.676 m)   Wt (!) 263 lb (119.3 kg)   SpO2 94% Comment: RA  BMI 42.45 kg/m    Gen: Alert, oriented, no distress  HEENT: nasal turbinates are unremarkable, no oropharyngeal lesions, no cervical or supraclavicular lymphadenopathy  CV: RRR, no M/G/R  Resp: Decreased, CTAB, no focal crackles or wheezes  Abd: obese, soft, nontender, no palpable organomegaly  Skin: no apparent rashes  Ext: no cyanosis, clubbing, trace bl ankle edema  Neuro: alert, nonfocal    Labs:  Reviewed  ABG 7.40/43    Imaging studies:  Personally reviewed:    6/24/19 CT PE:  ANGIOGRAM CHEST: Atherosclerotic plaque including coronary artery calcification. No aortic aneurysm or dissection. Mildly prominent right and left main pulmonary arteries suggestive of pulmonary artery hypertension. No pulmonary emboli.     RV/LV RATIO: N/A     LUNGS AND PLEURA: Stable benign 4 mm pulmonary nodule pleural-based on image #77 at the left lung base. Mild to moderate scattered fibroatelectasis. Diffuse air trapping often associated with small vessel or small airways disease. No effusion.     MEDIASTINUM: Moderate sized hiatal hernia. No mass or adenopathy.     LIMITED UPPER ABDOMEN: Hepatic steatosis. Left adrenal mass on prior not imaged.     MUSCULOSKELETAL: Degenerative disease. Mastectomy with reconstruction as on prior.     CONCLUSION:  1.  Evidence for pulmonary artery hypertension. No pulmonary emboli, aortic aneurysm or dissection. Atherosclerotic plaque including coronary artery disease.  2.  Moderate-sized hiatal hernia.  3.  Hepatic steatosis.  4.  Remainder stable.    8/21/19 PFT's  FEV1/FVC 65 FEV1 72 FVC 87  Negative BD response  TLC 93  RV/  DLCO ralph 81%  FVL has scooping of expiratory limb consistent with obstruction    Mild obstruction  Normal lung volumes  Normal diffusion capacity    8/13/19 Echo (care everywhere)  Unable to assess diastolic function  Interpretation Summary  1. Global and regional left ventricular " function is normal with an EF of 55-  60%.  2. The right ventricle is normal size. Global right ventricular function is mildly reduced.  3. No significant valvular disease.  4. Unable to assess pulmonary artery pressure.  5. IVC diameter <2.1 cm collapsing >50% with sniff suggests a normal RA  pressure of 3 mmHg.    There is no prior study for direct comparison.

## 2021-05-31 NOTE — PATIENT INSTRUCTIONS - HE
POST PROCEDURE INSTRUCTIONS  PROCEDURE DONE TODAY:BILATERAL C5-6 CERVICAL FACET JOINT INJECTION    Rest today. Resume activity tomorrow as able.  Patient demonstrates the ability to ambulate independently with a steady gait at the time of discharge.      It is not unusual to have a temporary increase in your pain after a procedure. You can ice the area 24-48 hrs. 15 min at a time.      You received a steroid injection. This medication can take 2-7 days to take effect. Some temporary side effects include:    Heartburn    Flushed-red face    Insomnia-trouble sleeping-jitters    Diabetics may see a rise in blood sugar for a few days    Low back or SI joint (sacroiliac) injection: you may experience numbness in one or both legs. Please walk with help. This is temporary and will wear off in a few hours. Do not drive if you are tingling, numbness or weakness in your hands/arms or legs/feet.    Headache:  If you experience a headache after a lumbar epidural injection, lie down and drink fluids (especially caffeine). The headache will go away gradually. If it persists notify our clinic.    Fever: call if fever 100 or over, redness/warmth/swelling over the injection site.    No public hot tubs/pools for a couple days    Physical therapy: check with pain center provider regarding resuming therapy.    Monitor your response to the injection and report this at your next visit.    If you have been referred for a procedure only, please call your referring provider for a follow up.    IF YOU PLAN TO GET A FLU SHOT YOU MUST WAIT 2 WEEKS AFTER THIS INJECTION.      CALL IF ANY QUESTIONS [phone]

## 2021-05-31 NOTE — PROGRESS NOTES
Mental Health Visit Note    8/15/2019   Start time: 1000    Stop Time: 1050   Session # 7    Session Type: Patient is presenting for an Individual session.    Randi Damon is a 64 y.o. female is being seen today for    Chief Complaint   Patient presents with      Follow Up     Depression     Anxiety   .     New symptoms or complaints: None    Functional Impairment:   Personal: 4  Family: 4  Work: NA  Social:4    Clinical assessment of mental status: Patient presented alert and oriented x3.  She was well-groomed.  Her attire was appropriate.  Patient was cooperative.  Patient motor actively was normal and eye contact appropriate.  Patients mood was anxious and affect congruent.  Patient s speech and language was normal.  Patient attention and thought process normal.  Concentration was distractible.  Patient denied delusions or hallucinations. Patient denied suicidal or homicidal ideation.  Patient denied any long or short term memory problems. No evidence of impairment in judgment.    She has insight and fund of knowledge was adequate.        Suicidal/Homicidal Ideation present: None Reported This Session    Patient's impression of their current status: Pt reports psychosocial stressors impacting mental, physical and chemical health.     Therapist impression of patients current state: Pt continues to process her alcohol use (relapse).  Discussed relapse process, triggers and recovery plan.  Processed impact of stress on her overall wellbeing and how to improve self-care & develop healthy coping skills.     Type of psychotherapeutic technique provided: Insight oriented, Client centered, Solution-focused and CBT    Progress toward short term goals:Progress as expected, relapse prevention, coping skills, self-care    Review of long term goals: Date of last review 6/7/2019    Diagnosis:   1. Bipolar affective disorder, currently depressed, moderate (H)    2. Chronic post-traumatic stress disorder (PTSD)    3.  Generalized anxiety disorder    4. Chronic pain syndrome        Plan and Follow up: Follow up with Mabel in 1 week  Attend AA support group this week  Avoid triggers and practice healthy coping skills  Follow up with all providers as scheduled      Discharge Criteria/Planning: Patient will continue with follow-up until therapy can be discontinued without return of signs and symptoms.    Shanice Merida 8/20/2019

## 2021-05-31 NOTE — TELEPHONE ENCOUNTER
Medication Question or Clarification  Who is calling: Pharmacy: [facility] - [place], [place] - [address] AT [address] [facility]   What medication are you calling about? (include dose and sig)    levothyroxine (SYNTHROID, LEVOTHROID) 150 MCG tablet (Discontinued) 90 tablet 1 7/30/2019 8/2/2019    Sig: TAKE 1 TAB BY MOUTH ALTERNATING WITH 175 MCG    Sent to pharmacy as: levothyroxine (SYNTHROID, LEVOTHROID) 150 MCG tablet    Notes to Pharmacy: Please dispense patient 90 tablets at this is more cost effective for pt.    E-Prescribing Status: Receipt confirmed by pharmacy (7/30/2019  4:54 PM CDT)      Who prescribed the medication?: Dr. Stockton  What is your question/concern?: The pharmacist would like clarification on the above script. What is the frequency for the 150 mcg tablet?  Pharmacy: [facility] - [place], [place] - [address] AT [address] [facility]   Okay to leave a detailed message?: Yes  Site CMT - Please call the pharmacy to obtain any additional needed information.

## 2021-05-31 NOTE — PROGRESS NOTES
[facility] Follow Up Note    Assessment/Plan:  1. Chronic obstructive pulmonary disease with acute exacerbation (H)  She still has not yet seen the pulmonologist.  She is scheduled for PFTs and a pulmonary consult sometime in August.  Currently still wheezing.  Using Symbicort 2 puffs twice daily and one albuterol neb daily.  She has restricted her exposure to her current allergens.  They are running their air conditioning.  Exam shows wheezing with forced vital capacity maneuver.  Recommendation: Proceed with PFTs and see specialist as ordered.  Increase albuterol neb treatments to 2-3 times daily    2. Vitamin D deficiency  He is using 10,000 international units of vitamin D regularly.  We will proceed with a level to make sure this is not toxic for her  - Vitamin D, Total (25-Hydroxy)    3. Postablative hypothyroidism  We will update lab.  For simplification with her pharmacy, we have switched her.  Prescription 275 mcg 6 times per week.  We will get new baseline  - Thyroid Stimulating Hormone (TSH)    4. Gastroesophageal reflux disease with esophagitis  CT scan shows moderate hiatal hernia.  Have discussed the importance of antireflux measures such as no food in the evening.  She will continue with omeprazole 20 mg daily.  Keep head of bed elevated at night.    5. Hyperlipidemia LDL goal <100  She has not yet started Lipitor.    6.  History of mental health issues-seeing psychiatry  See below: Additionally, she reports that she had a drink 2 weeks ago.  Therefore, she cannot be enrolled in the cannabis program.  Also, her pain clinic will not give her any benzodiazepines even though she is off the fentanyl patch.  She is going to need to be in touch with her psychiatrist.    Of note, she has a variety of questions regarding the statistics of her prescriptions.  I have informed her that this is not within my purview.  She will need to call her insurance company and deal with her mail order pharmacy.   We will issue with the appropriate medical prescriptions, etc.  She is also upset as her  is being evaluated for possible kidney cancer.  She is feeling very anxious.        Loida Stockton MD    Chief Complaint:  Chief Complaint   Patient presents with     Follow-up       History of Present Illness:  Randi is a 63 y.o. female who is here today for follow-up.  She originally scheduled this appointment to discuss her pharmaceutical issues.  We have informed her that she needs to deal with her pharmacy company and insurance on her own.  We will issue the appropriate prescriptions to the insurance designated places.  Of note, at last visit, we discussed a CT scan that was done in the emergency department.  It was suggestive of pulmonary hypertension and also pulmonary calcifications.  A variety of consults were set up.  Additionally, she has been having an exacerbation of her asthma and COPD throughout the summer.  She does continue to expose herself to environmental allergens, pets etc.    With regard to consults and diagnostics which have included PFTs, echo, etc., she has not yet completed these.  She does have each of these things scheduled.  She will be following up with a pulmonary hypertension specialist that she researched as well as a lung specialist.    She continues to struggle with overeating.  She is eating at night and experiencing reflux.  She cannot understand why she is gaining weight.    Additionally, she is quite upset today as her  is being evaluated for a renal cell cancer.  She is off benzodiazepines and is having some episodes of panic.  She even took a alcoholic beverage with a history of alcoholism.    Review of Systems:  A comprehensive review of systems was performed and was otherwise negative    PFSH:  Social History: She is .  Her  is being evaluated for cancer.  She is seeing mental health specialist on a regular basis.  She is seen by the pain clinic for  pain management.  Social History     Tobacco Use   Smoking Status Former Smoker     Last attempt to quit: 2003     Years since quittin.0   Smokeless Tobacco Never Used       Past History:   Past Medical History:   Diagnosis Date     Anxiety      Breast cancer (H)      Chronic pain syndrome      COPD (chronic obstructive pulmonary disease) (H)      Depression      Esophageal reflux      Graves disease      Hemochromatosis      Hx antineoplastic chemotherapy      Hx of radiation therapy      Hypertension      Impaired fasting glucose      Pheochromocytoma      Psoriasis      Psoriatic arthropathy (H)      Restless leg syndrome      Right rotator cuff tear      Sleep apnea 2017    CPAP     Spinal stenosis      Urinary incontinence      Vitamin B12 deficiency        Current Outpatient Medications   Medication Sig Dispense Refill     albuterol (PROAIR HFA;PROVENTIL HFA;VENTOLIN HFA) 90 mcg/actuation inhaler Inhale 2 puffs every 6 (six) hours as needed for wheezing. 3 Inhaler 3     albuterol (PROVENTIL) 2.5 mg /3 mL (0.083 %) nebulizer solution Take 3 mL (2.5 mg total) by nebulization every 6 (six) hours as needed for wheezing. 75 mL 12     aspirin 325 MG EC tablet Take 325 mg by mouth daily as needed for pain.       atorvastatin (LIPITOR) 10 MG tablet Take 1 tablet (10 mg total) by mouth daily. 90 tablet 11     budesonide-formoterol (SYMBICORT) 160-4.5 mcg/actuation inhaler Inhale 2 puffs 2 (two) times a day. 3 Inhaler 3     cyanocobalamin, vitamin B-12, 2,500 mcg Subl Place under the tongue 2 (two) times a week.       diclofenac sodium (VOLTAREN) 1 % Gel Apply 2 g topically 2 (two) times a day as needed (pain on shoulder and ankle). 100 g 12     DULoxetine (CYMBALTA) 60 MG capsule Take 1 capsule (60 mg total) by mouth daily. 30 capsule 1     ergocalciferol (VITAMIN D2) 50,000 unit capsule Take 50,000 Units by mouth 2 (two) times a week.       folic acid (FOLVITE) 1 MG tablet Take 1 tablet (1 mg total) by  mouth daily. 90 tablet 3     HYDROcodone-acetaminophen 5-325 mg per tablet Take 1 tablet by mouth every 4 (four) hours as needed for pain. 84 tablet 0     levothyroxine (SYNTHROID, LEVOTHROID) 175 MCG tablet Take 1 tablet daily for 6 days of the week, 7th day PRN 90 tablet 3     naloxone (NARCAN) 4 mg/actuation nasal spray 1 spray (4 mg dose) into one nostril for opioid reversal. Call 911. May repeat if no response in 3 minutes. 1 Box 0     omeprazole (PRILOSEC) 20 MG capsule TAKE 1 CAPSULE BY MOUTH 2 TIMES A  capsule 3     pramipexole (MIRAPEX) 1 MG tablet Take 0.5-1 tablets (0.5-1 mg total) by mouth 2 (two) times a day. 60 tablet 5     triamterene-hydrochlorothiazide (MAXZIDE-25) 37.5-25 mg per tablet Take 1 tablet by mouth daily. 90 tablet 3     Current Facility-Administered Medications   Medication Dose Route Frequency Provider Last Rate Last Dose     albuterol nebulizer solution 2.5 mg (PROVENTIL)  2.5 mg Inhalation Once Loida Stockton MD         cyanocobalamin injection 1,000 mcg  1,000 mcg Intramuscular Q30 Days Loida Stockton MD   1,000 mcg at 05/28/19 0912       Family History: Nothing new    Physical Exam:  General Appearance:   She appears slightly agitated today.  She is somewhat tearful when discussing her   Vitals:    08/09/19 1111   BP: 136/88   Patient Site: Left Arm   Patient Position: Sitting   Cuff Size: Adult Large   Pulse: (!) 102   SpO2: 96%   Weight: (!) 258 lb (117 kg)     Wt Readings from Last 3 Encounters:   08/09/19 (!) 258 lb (117 kg)   07/23/19 (!) 258 lb (117 kg)   06/27/19 (!) 258 lb (117 kg)     Body mass index is 41.64 kg/m .    Head neck exam is unremarkable  Lungs reveal clear breath sounds with normal breathing.  With forced vital capacity maneuver there is extensive expiratory wheezes and delayed expiratory phase.  Cardiac exam reveals tachycardia  Abdomen is obese but overall nontender

## 2021-05-31 NOTE — TELEPHONE ENCOUNTER
Please let Winnie know that her insurance does not cover the Folvite.  She can take over-the-counter folic acid if she would like.

## 2021-05-31 NOTE — PROGRESS NOTES
Mental Health Visit Note    8/8/2019    Start time: 900    Stop Time: 950   Session # 6    Session Type: Patient is presenting for an Individual session.    Randi Damon is a 63 y.o. female is being seen today for    Chief Complaint   Patient presents with      Follow Up     Anxiety     Depression     PTSD   .     New symptoms or complaints: None    Functional Impairment:   Personal: 4  Family: 4  Work: 4  Social:4    Clinical assessment of mental status: Patient presented alert and oriented x3.  She was well-groomed.  Her attire was appropriate.  Patient was cooperative.  Patient motor actively was normal and eye contact appropriate.  Patients mood was anxious and affect congruent.  Patient s speech and language was normal.  Patient attention and thought process normal.  Concentration was distractible.  Patient denied delusions or hallucinations. Patient denied suicidal or homicidal ideation.  Patient denied any long or short term memory problems. No evidence of impairment in judgment.   She has insight and fund of knowledge was adequate.        Suicidal/Homicidal Ideation present: None Reported This Session    Patient's impression of their current status: Pt reports significant psychosocial stressors that are increasing anxiety symptoms.     Therapist impression of patients current state: Pt processed current stressors including health concerns of her significant other.  Discussed ability to stay in the moment and focusing on what she can control verse what she is unable to control.   attended end of session to discuss the impact of her anxiety on herself and him.  Pt processed ability to manage anxiety w/o benzodiazapine's and how she was using alcohol as a replacement. Processed additional coping strategies that are healthier and safer for her to utilize to manage anxiety symptoms.     Type of psychotherapeutic technique provided: Insight oriented, Client centered, Solution-focused and  CBT    Progress toward short term goals:Progress as expected, coping skills    Review of long term goals: Date of last review 6/7/2019    Diagnosis:   1. Generalized anxiety disorder    2. Chronic post-traumatic stress disorder (PTSD)    3. Bipolar affective disorder, currently depressed, moderate (H)    4. Chronic pain syndrome        Plan and Follow up: Follow up with Mabel in 1 week  Practice healthy coping strategies as discussed  Follow up with all providers as scheduled  Avoid use of alcohol or illicit substance      Discharge Criteria/Planning: Patient will continue with follow-up until therapy can be discontinued without return of signs and symptoms.    Shanice Merida 8/8/2019

## 2021-05-31 NOTE — TELEPHONE ENCOUNTER
Patient calls, voicemail reporting receiving a message from the provider.  Returning the providers phone call and would like a phone call back.

## 2021-05-31 NOTE — PATIENT INSTRUCTIONS - HE
Plan:   Plan of Care / NextSteps:     Follow up by:I would like you to schedule with Dr. Dubois for a second opinion about pain, addiction and mental health      Education:   Please call Monday-Friday for problems or questions and one of the clinical support staff (CSS) will help to get things figured out. The number is [phone].     [employer] is a means to send an e-mail ([employer] message) to communicate any concerns.     Please remember some issues require an office visit.     Today we reviewed the plan of care and answered questions.      #1. Question about opioid and medical cannabis. There is a specific focus on chemical dependency. We would  Need you to be in recovery solidly.    #2. Anxiety medication - if you are having anxiety there are many options. You may get benefit from gabapentin, hydroxyzine, Buspar for anxiety. I am not clear if he thinks you will be getting benefit from pristique. A mood stabilizer may be supportive as well - depakote or lamicital.    #3. Sidney can always call please sign a release so we can speak with him back    Records: Reviewed to assist with preparation for the office visit and are reflected throughout the note.    Primary Care: You need to have an annual physical and check in with your primary care folks on a regular basis. Talk with your primary care about the frequency of expected visits.     Behavioral Medicine: Continue with Mabel     Consultation/Specialists:   I understand you will be seeing Hematology     Diagnostics:   Viewed by Winnie Damon on 7/26/2019 11:09 PM   Written by Consuelo Little CNP on 7/26/2019  1:00 PM   Reviewed note 7/23/19 Last opioid dose was Hydrocodone last dose- 07/23/2019 @ 645am; last drink was Saturday 7/20/19     UDT:   Detected hydrocodone and metabolites (expected)     I understand you are looking into a sleep study and a stress test    Medication prescribed / to be continued:   Medication prescribed today:    Requested  Prescriptions     Pending Prescriptions Disp Refills     HYDROcodone-acetaminophen 5-325 mg per tablet week supply while we are waiting to gt the burtans  42 tablet 0     Sig: Take 1 tablet by mouth every 4 (four) hours as needed for pain.     buprenorphine (BUTRANS) 10 mcg/hour PTWK patch new medication we need to monitor your skin 4 patch 0     Sig: Place 1 patch on the skin every 7 days.         REFILL INSTRUCTIONS: Please be mindful to chose a single means of communication about medication refills as multiple means of communication for the same prescription request  (your pharmacy, mychart and telephone calls) leads to confusion and delays    Note: Due to the increase in paperwork we are no longer leaving voice mail messages when refills have been sent to the pharmacy    Please contact the clinic 3-7 days before your refill is due. Speak clearly; note cell phones cut in-and-out and poor quality speech and reception issues will influence our ability to hear you and be efficient with your prescription.     Call [phone] leave:   Your name (first and last w/ spelling)   Date of birth  Name of all the medication(s) being requested  Dose of the medication(s)   How you are taking the medication (eg. twice per day etc).     Contact your pharmacy and talk with your pharmacist about any government Controlled/Scheduled medications 3 (three) days after leaving your message to see if your prescription has been received. Please request the pharmacist check your profile to be certain about any concerns with a script failing to be received.   If the script has not been received there may have been a problem with the communication please reach back out to the clinic.

## 2021-06-01 ENCOUNTER — COMMUNICATION - HEALTHEAST (OUTPATIENT)
Dept: NURSING | Facility: CLINIC | Age: 66
End: 2021-06-01

## 2021-06-01 VITALS — WEIGHT: 241.5 LBS | BODY MASS INDEX: 38.98 KG/M2

## 2021-06-01 VITALS — HEIGHT: 66 IN | BODY MASS INDEX: 38.57 KG/M2 | WEIGHT: 240 LBS

## 2021-06-01 VITALS — BODY MASS INDEX: 38.73 KG/M2 | WEIGHT: 241 LBS | HEIGHT: 66 IN

## 2021-06-01 VITALS — WEIGHT: 230 LBS | BODY MASS INDEX: 36.96 KG/M2 | HEIGHT: 66 IN

## 2021-06-01 VITALS — BODY MASS INDEX: 38.57 KG/M2 | WEIGHT: 240 LBS | HEIGHT: 66 IN

## 2021-06-01 VITALS — BODY MASS INDEX: 38.41 KG/M2 | HEIGHT: 66 IN | WEIGHT: 239 LBS

## 2021-06-01 VITALS — WEIGHT: 240 LBS | BODY MASS INDEX: 38.57 KG/M2 | HEIGHT: 66 IN

## 2021-06-01 VITALS — BODY MASS INDEX: 38.9 KG/M2 | WEIGHT: 241 LBS

## 2021-06-01 VITALS — WEIGHT: 241.5 LBS | BODY MASS INDEX: 38.81 KG/M2 | HEIGHT: 66 IN

## 2021-06-01 NOTE — TELEPHONE ENCOUNTER
Medication being requested: Hydroxazine  Last visit date: 9/12 Provider: gregory  Next visit date: 10/4Provider: gregory  Expected follow up: 4 weeks  [facility] ([facility] date: na  Pertinent between visit information about requested medication (telephone, mychart, prior authorization, concerns): na  Last date prescribed: 9/12  Provider responsible: gregory  Spoke with patient: no  Script being sent to provider - dates and quantity:   Requested Prescriptions     Pending Prescriptions Disp Refills     hydrOXYzine pamoate (VISTARIL) 25 MG capsule 56 capsule 0     Sig: Take 1 capsule (25 mg total) by mouth 4 (four) times a day as needed for anxiety.     [facility] c[facility]

## 2021-06-01 NOTE — PROGRESS NOTES
September 30, 2019    Dear Randi:    We missed you! You recently missed your 2nd scheduled appointment with ASHKAN Brandon, [facility] on September 30, 2019 without calling us 24 hours before to cancel your appointment.     If you miss three scheduled appointments (late cancels), you will no longer be able to make appointments in advance. You will only be able to make appointments the same day, if one is available. If you have three no shows you will be discharged from psychotherapy services and not allowed to schedule. You have an appointment scheduled on October 10th, 2019 please make it your top priority to attend.     If you have questions please call the clinic at [phone]. We are committed to partnering with you to meet your health care needs and hope that we can continue to serve you.        Sincerely,  Clinic Director

## 2021-06-01 NOTE — TELEPHONE ENCOUNTER
She does not like the Butrans - not remaining on the skin and she did not feel it was helping much. She no longer has the hydrocodone. She has been using the Butrans 10mcg x 3 patches.     Injection did not help her neck.    We discussed d/c of the Butrans and movement back to the hydrocodone with a focus on tapering.     We discussed lidocaine and acetaminophen. I also think an NSAID would be reasonable, however she has been struggling with shortness of breath and there are some cardiac concerns. I will reach out to care team members.      [phone]    TC placed to [phone]     Francisco J Edwards MD  [phone] Gladis Stanley nurse   Focus to discuss the NSAID option - not recommended oral topical would be ok.      Sara Hill MD    [phone] redirected to [phone] - went to busy signal. No able to send email or staff message

## 2021-06-01 NOTE — TELEPHONE ENCOUNTER
Completed  Requested Prescriptions     Signed Prescriptions Disp Refills     HYDROcodone-acetaminophen 5-325 mg per tablet 21 tablet 0     Sig: Take 1 tablet by mouth 3 (three) times a day as needed for pain.     Authorizing Provider: HUMAIRA HANSEN     diclofenac sodium (VOLTAREN) 1 % Gel 300 g 0     Sig: Apply 2 g topically 4 (four) times a day. To shoulder     Authorizing Provider: HUMAIRA HANSEN

## 2021-06-01 NOTE — TELEPHONE ENCOUNTER
I am giving her a 2 week supply at 2 per day this is a reduction. We have been reducing her opioids    Requested Prescriptions     Signed Prescriptions Disp Refills     HYDROcodone-acetaminophen 5-325 mg per tablet 28 tablet 0     Sig: Take 1 tablet by mouth 2 (two) times a day as needed for pain.     Authorizing Provider: HUMAIRA HANSEN     Hydroxyzine was initiated at her last visit due to anxiety.     [email or web address] message sent

## 2021-06-01 NOTE — PROGRESS NOTES
Mental Health Visit Note    9/5/2019   Start time: 900    Stop Time: 950   Session # 8    Session Type: Patient is presenting for an Individual session.    Randi Damon is a 64 y.o. female is being seen today for    Chief Complaint   Patient presents with      Follow Up     Depression     Anxiety   .     New symptoms or complaints: None    Functional Impairment:   Personal: 4  Family: 4  Work: NA  Social:4    Clinical assessment of mental status: Patient presented alert and oriented x3.  She was well-groomed.  Her attire was appropriate.  Patient was cooperative.  Patient motor actively was normal and eye contact appropriate.  Patients mood was anxious and affect congruent.  Patient s speech and language was normal.  Patient attention and thought process normal.  Concentration was appropriate.  Patient denied delusions or hallucinations. Patient denied suicidal or homicidal ideation.  Patient denied any long or short term memory problems. No evidence of impairment in judgment.    She has insight and fund of knowledge was adequate.        Suicidal/Homicidal Ideation present: None Reported This Session    Patient's impression of their current status: Pt reports symptoms of depression, anxiety and chronic pain that impact daily functioning.     Therapist impression of patients current state: Processed patients recovery and ability to build a strong sobriety program.  She has been attending  and reports no alcohol use since our last visit.  Processed her challenges and coping strategies.  Discussed health issues that impact mental & chemical health.      Type of psychotherapeutic technique provided: Insight oriented, Client centered, Solution-focused and CBT    Progress toward short term goals:Progress as expected, coping skills, recovery program, self-care    Review of long term goals: Date of last review 6/7/2019    Diagnosis:   1. Bipolar affective disorder, currently depressed, moderate (H)    2. Chronic  post-traumatic stress disorder (PTSD)    3. Generalized anxiety disorder    4. Chronic pain syndrome        Plan and Follow up: Follow up with Mabel in 1 week  Practice self-care and coping skills daily  Attend AA weekly avoid use of alcohol  Follow up with all providers as scheduled      Discharge Criteria/Planning: Patient will continue with follow-up until therapy can be discontinued without return of signs and symptoms.    Shanice Mreida 9/10/2019

## 2021-06-01 NOTE — TELEPHONE ENCOUNTER
"Patient left 2 very long message for Anne-Marie. 1st message,  Had to cancel appt on Friday, because of medical issue for 'Sidney\". Has been diagnosed with kidney mass. Has been seen by a specialist. [facility] Will be treated there. Patient has rescheduled for 10/31 with Anne-Marie. Has appt on 10/15 with Dr Dubois. Patient would like a call from Anne-Marie. Wants to review how things are going.     2nd message, was the exact same thing as above.  Patient also said,  so confused, not sure if her MyChart message, or her phone messages got through.    I called her and reassured that we got the phone messages, but do not see a MyChart message. Patient thinks she deleted it, instead of sending it. She again, said,  \" would really like a call from provider, she has called me before\"..   "

## 2021-06-01 NOTE — PROGRESS NOTES
"Mental Health Visit Note    9/26/2019  Start time: 1000    Stop Time: 1050   Session # 10    Session Type: Patient is presenting for an Individual session.    Randi Damon is a 64 y.o. female is being seen today for    Chief Complaint   Patient presents with      Follow Up     Depression     Anxiety   .     New symptoms or complaints: None    Functional Impairment:   Personal: 4  Family: 4  Work: 4  Social:4    Clinical assessment of mental status: Patient presented alert and oriented x3.  She was well-groomed.  Her attire was appropriate.  Patient was cooperative.  Patient motor actively was normal and eye contact appropriate.  Patients mood was anxious and affect congruent.  Patient s speech and language was normal.  Patient attention and thought process normal.  Concentration was appropriate.  Patient denied delusions or hallucinations. Patient denied suicidal or homicidal ideation.  Patient denied any long or short term memory problems. No evidence of impairment in judgment.  She has insight and fund of knowledge was adequate.        Suicidal/Homicidal Ideation present: None Reported This Session    Patient's impression of their current status: Pt reports psychosocial stressors impacting mental & physical health.     Therapist impression of patients current state: Pt struggling with the recent health concerns of her significant other and multiple other stressors.  Pt processed concerns and ability to cope with current stress.  Discussed healthy coping strategies and her current \"white knuckle\" approach to sobriety. Processed self-care strategies and encouraged her to reach out to support network. Updated assessments but did not get chance to review treatment plan today.     Type of psychotherapeutic technique provided: Insight oriented, Client centered, Solution-focused and CBT    Progress toward short term goals:Progress as expected, coping skills    Review of long term goals: Treatment Plan updated- " assessments updated    Diagnosis:   1. Bipolar affective disorder, currently depressed, moderate (H)    2. Chronic post-traumatic stress disorder (PTSD)    3. Generalized anxiety disorder    4. Chronic pain syndrome        Plan and Follow up: follow up with Mabel in 1 week  Follow up with all providers as scheduled  Practice healthy coping strategies as discussed  Avoid use of alcohol  Attend AA meetings         Discharge Criteria/Planning: Patient will continue with follow-up until therapy can be discontinued without return of signs and symptoms.    Shanice Merida 9/30/2019

## 2021-06-01 NOTE — PROGRESS NOTES
Mental Health Visit Note    9/12/2019    Start time: 1400    Stop Time: 1450   Session # 9    Session Type: Patient is presenting for an Individual session.    Randi Damon is a 64 y.o. female is being seen today for    Chief Complaint   Patient presents with      Follow Up     Depression     Anxiety   .     New symptoms or complaints: increased depression, anxiety & Chronic pain symptoms.    Functional Impairment:   Personal: 4  Family: 4  Work: NA  Social:4    Clinical assessment of mental status: Patient presented alert and oriented x3.  She was well-groomed.  Her attire was appropriate.  Patient was cooperative.  Patient motor actively was normal and eye contact appropriate.  Patients mood was anxious and affect tearful.  Patient s speech and language was normal.  Patient attention and thought process normal.  Concentration was appropriate.  Patient denied delusions or hallucinations. Patient denied suicidal or homicidal ideation.  Patient denied any long or short term memory problems. No evidence of impairment in judgment. She has insight and fund of knowledge was adequate.        Suicidal/Homicidal Ideation present: None Reported This Session    Patient's impression of their current status: Pt reports with medication changes an increase in mental and physical health symptoms.     Therapist impression of patients current state: Processed feelings/fears/emotions related to medication changes and struggle chronic pain management.  Processed ability to communicate effectively and manage mental & physical health.  Processed psychosocial stressors that also impact mood and coping strategies.     Type of psychotherapeutic technique provided: Insight oriented, Client centered, Solution-focused and CBT    Progress toward short term goals:Progress as expected, coping skills, communication, self-care    Review of long term goals: Date of last review 6/7/2019    Diagnosis:   1. Bipolar affective disorder,  currently depressed, moderate (H)    2. Chronic post-traumatic stress disorder (PTSD)    3. Generalized anxiety disorder    4. Chronic pain syndrome        Plan and Follow up: Follow up with Mabel in 1-2 weeks  Follow up with Anne-Marie & Dr. Dubois as scheduled  Avoid use of alcohol & take medications as prescribed  Practice self-care and follow up with all providers as scheduled      Discharge Criteria/Planning: Patient will continue with follow-up until therapy can be discontinued without return of signs and symptoms.    Shanice Merida 9/12/2019

## 2021-06-01 NOTE — PATIENT INSTRUCTIONS - HE
Plan:   Plan of Care / NextSteps:     Follow up by: 2 weeks      Education:   Please call Monday-Friday for problems or questions and one of the clinical support staff (CSS) will help to get things figured out. The number is [phone].     [employer] is a means to send an e-mail ([employer] message) to communicate any concerns.     Please remember some issues require an office visit.     Today we reviewed the plan of care and answered questions.      Records: Reviewed to assist with preparation for the office visit and are reflected throughout the note.    Primary Care: You need to have an annual physical and check in with your primary care folks on a regular basis. Talk with your primary care about the frequency of expected visits.     Behavioral Medicine: Continue with Mabel    Rehabilitation: myofacial release and visceral manipulation ordered    Interventional: We discussed your shoulder surgery at this time I do not see a reason you would not be able to have the surgery you are concerned about surgery b/c you do not feel well      Consultation/Specialists:   You are working up the not feeling well with - pulmonary/sleep; Heme/Onc; endocrinology; cardiology    Medication prescribed / to be continued:   Medication prescribed today:    Requested Prescriptions     Signed Prescriptions Disp Refills     HYDROcodone-acetaminophen 5-325 mg per tablet no reduction made today. I am expecting the continued reduction like we spoke about. 21 tablet 0     Sig: Take 1 tablet by mouth 3 (three) times a day as needed for pain.     hydrOXYzine pamoate (VISTARIL) 25 MG capsule medication trial for anxiety 56 capsule 0     Sig: Take 1 capsule (25 mg total) by mouth 4 (four) times a day as needed for anxiety.         REFILL INSTRUCTIONS: Please be mindful to chose a single means of communication about medication refills as multiple means of communication for the same prescription request  (your pharmacy, [employer] and telephone calls)  leads to confusion and delays    Note: Due to the increase in paperwork we are no longer leaving voice mail messages when refills have been sent to the pharmacy    Please contact the clinic 3-7 days before your refill is due. Speak clearly; note cell phones cut in-and-out and poor quality speech and reception issues will influence our ability to hear you and be efficient with your prescription.     Call [phone] leave:   Your name (first and last w/ spelling)   Date of birth  Name of all the medication(s) being requested  Dose of the medication(s)   How you are taking the medication (eg. twice per day etc).     Contact your pharmacy and talk with your pharmacist about any government Controlled/Scheduled medications 3 (three) days after leaving your message to see if your prescription has been received. Please request the pharmacist check your profile to be certain about any concerns with a script failing to be received.   If the script has not been received there may have been a problem with the communication please reach back out to the clinic.

## 2021-06-01 NOTE — PROGRESS NOTES
Pt is being seen today by Anne-Marie Little CNP, for refills and f/u of 7-constant, throbbing, achy, bilat shoulder pain.   F8

## 2021-06-02 ENCOUNTER — RECORDS - HEALTHEAST (OUTPATIENT)
Dept: ADMINISTRATIVE | Facility: CLINIC | Age: 66
End: 2021-06-02

## 2021-06-02 VITALS — WEIGHT: 245 LBS | BODY MASS INDEX: 39.37 KG/M2 | HEIGHT: 66 IN

## 2021-06-02 VITALS — BODY MASS INDEX: 36.96 KG/M2 | HEIGHT: 66 IN | WEIGHT: 230 LBS

## 2021-06-02 VITALS
HEIGHT: 66 IN | WEIGHT: 247 LBS | WEIGHT: 247 LBS | BODY MASS INDEX: 39.93 KG/M2 | BODY MASS INDEX: 39.87 KG/M2 | BODY MASS INDEX: 39.87 KG/M2 | BODY MASS INDEX: 39.93 KG/M2 | HEIGHT: 66 IN

## 2021-06-02 VITALS — WEIGHT: 248.38 LBS | BODY MASS INDEX: 41.38 KG/M2 | HEIGHT: 65 IN

## 2021-06-02 VITALS — HEIGHT: 66 IN | BODY MASS INDEX: 40.82 KG/M2 | WEIGHT: 254 LBS

## 2021-06-02 VITALS — WEIGHT: 247.38 LBS | BODY MASS INDEX: 39.76 KG/M2 | HEIGHT: 66 IN

## 2021-06-02 VITALS — WEIGHT: 244.6 LBS | BODY MASS INDEX: 39.48 KG/M2

## 2021-06-02 VITALS — BODY MASS INDEX: 38.57 KG/M2 | WEIGHT: 240 LBS | HEIGHT: 66 IN

## 2021-06-02 VITALS — HEIGHT: 66 IN | BODY MASS INDEX: 40.18 KG/M2 | WEIGHT: 250 LBS

## 2021-06-02 VITALS — HEIGHT: 66 IN | WEIGHT: 240 LBS | BODY MASS INDEX: 38.57 KG/M2

## 2021-06-02 NOTE — TELEPHONE ENCOUNTER
Pt would like a prescription for cough syrup with codine as it has helped in the past. Pt stated that Delsym does not work, pt states that she is only taking hydrocodone and she states that the pain clinic has been weaning her off of the pain meds. Pt states that the OTC cough medicines don't do anything, states that the OTC stuff is like water. States she is in pain coughing     She does thank PCP for the other meds that were sent to her pharmacy.

## 2021-06-02 NOTE — PROGRESS NOTES
Assessment: Dated 2019 NDI Score: 30    Pain Intensity:  2-The pain is moderate at the moment    Personal Care:  2-It is painful to look after myself and I am slow and careful    Liftin-I cannot lift or carry anything at all    Reading:  3-I can't read as much as I want b/c of moderate pain in my neck    Headaches:  3-I have moderate headache  Which come frequently    Concentration:  2-I have a fair degree of difficulty in concentrating when I want to    Work:  3-I cannot do my usual work     Driving:  3-I can't drive my car as long as I want b/c of moderate pain in my neck    Sleepin-My sleep is greatly disturbed (3-5 hours sleepless)    Recreation:  3--I am able to engage in a few of my usual recreational activities b/c of pain in my neck.

## 2021-06-02 NOTE — TELEPHONE ENCOUNTER
My apologies to her.  She has a controlled substance arrangement with the pain clinic.  We are prohibited from ordering other controlled substances as a result.  This is company policy that is now very firmly enforced.  I think her cough will improve once her wheezing is better.

## 2021-06-02 NOTE — PROGRESS NOTES
"Assessment/Plan:     Problem List Items Addressed This Visit     Neck pain    Relevant Medications    HYDROcodone-acetaminophen 5-325 mg per tablet      Other Visit Diagnoses     Chronic pain syndrome        Relevant Medications    HYDROcodone-acetaminophen 5-325 mg per tablet            No follow-ups on file.    Patient Instructions   PLAN:    Sign PAULETTE for Dr. Dubois to speak to Dr. Zach Dubois to discuss with Anne-Marie Monroe    Discussed Suboxone, may begin after above discussions    Reviewed alternatives such as ketamine, medical cannabis    Return to Dr. Dubois in 5-6 weeks        Subjective:       64 y.o. female presents for evaluation of mental health and substance abuse, followed by Anne-Marie Little NP at the pain center.    64-year-old living in [place] with her  who is retired and works 3 jobs, she has been on disability for 20 years.  No children.    HPI: Chart is reviewed with a concern for history of alcohol problems, and relapse over the last year.  Has been inconsistent appointments with Mabel Merida.    She describes pain problems beginning when falling on icy sidewalk 30 years ago, injuring her right ankle.  She eventually had surgery describes it was \"fused at his extreme angle\".  Over the years she describes her hips have had problems.  At times her ankle was so bad she was using a walker cane.  Has used insoles.  Describes a number of injections for her back developing \"stenosis\".  She reviews other medical complications during this time including developing breast cancer.  Also had a pheochromocytoma and adrenalectomy describe he has not felt well since then.  Has had problems with hiatal hernia.  Has had Graves' disease with 2 courses of radiotherapy.    Has had problems with shoulder surgeries and injections.    Has been diagnosed with COPD, and with sleep apnea, struggles with the CPAP mask.    She reviews these medical problems have been complicated with mental health " "concerns.  Describes been in counseling for family issues.  She went to chemical dependency twice and lifted a long term house.    Is been followed by Dr. Serrano, a variety of medications with question of bipolar disorder.    She reports that she is been to 20 years sober, then began to have problems over the last year.  She attributes this in part to her brother-in-law dying, being supportive to his wife who drank.    Been maintained on hydrocodone 10 mg up to 6 to 8 tablets a day after ankle surgeries, presently using 5 mg twice a day.  After the alcohol concerns were noted she did have a trial of the Butrans patch.  She describes she did not like how it made her feel and was \"scared\".  In part she did not like that the dose could not be adjusted.  She recalls trying for 3 weeks, does not recall the dose.    She describes part of the relapse also involved with being \"tired\", life not fun.  She notes other stressors, her  has a dog who has developed Giardia,  will not put down.  He is gone driving [employer] and she is left to do with his condition which is overwhelming.  She notes her  brings home called from the kids on the [employer] which exacerbates her COPD.  He has been recently diagnosed with kidney cancer.  She acknowledges missing appointments with Mabel.    Medical problems include lower extremity swelling, diagnosed with hematochromatosis after her breast cancer having phlebotomies.  More recently working with hematology.    She had an angiogram last week, diagnosed with pulmonary hypertension.      Current Outpatient Medications:      albuterol (PROVENTIL) 2.5 mg /3 mL (0.083 %) nebulizer solution, Take 3 mL (2.5 mg total) by nebulization every 6 (six) hours as needed for wheezing., Disp: 75 mL, Rfl: 12     aspirin 325 MG EC tablet, Take 325 mg by mouth daily as needed for pain., Disp: , Rfl:      azithromycin (ZITHROMAX) 500 MG tablet, Take 1 tablet (500 mg total) by mouth daily for 5 " days., Disp: 5 tablet, Rfl: 0     budesonide-formoterol (SYMBICORT) 160-4.5 mcg/actuation inhaler, Inhale 2 puffs 2 (two) times a day., Disp: 3 Inhaler, Rfl: 3     cholecalciferol, vitamin D3, (VITAMIN D3) 5,000 unit Tab, Take 10,000 Units by mouth daily., Disp: , Rfl:      cyanocobalamin, vitamin B-12, 5,000 mcg Subl, Place 5,000 mcg under the tongue daily., Disp: , Rfl:      desvenlafaxine succinate (PRISTIQ) 50 MG 24 hr tablet, 1 tablet daily., Disp: , Rfl:      HYDROcodone-acetaminophen 5-325 mg per tablet, Take 1 tablet by mouth 2 (two) times a day as needed for pain., Disp: 28 tablet, Rfl: 0     levothyroxine (SYNTHROID, LEVOTHROID) 175 MCG tablet, Take 1 tablet daily for 6 days of the week, 7th day PRN, Disp: 14 tablet, Rfl: 0     omeprazole (PRILOSEC) 20 MG capsule, TAKE 1 CAPSULE BY MOUTH 2 TIMES A DAY, Disp: 180 capsule, Rfl: 3     pramipexole (MIRAPEX) 1 MG tablet, Take 0.5-1 tablets (0.5-1 mg total) by mouth 2 (two) times a day., Disp: 60 tablet, Rfl: 5     predniSONE (DELTASONE) 20 MG tablet, Take 40 mg by mouth daily., Disp: 10 tablet, Rfl: 0     triamterene-hydrochlorothiazide (MAXZIDE-25) 37.5-25 mg per tablet, Take 1 tablet by mouth daily., Disp: 90 tablet, Rfl: 3     VITAMIN K2 ORAL, Take 300 mcg by mouth daily., Disp: , Rfl:      atorvastatin (LIPITOR) 10 MG tablet, Take 1 tablet (10 mg total) by mouth daily., Disp: 90 tablet, Rfl: 11     diclofenac sodium (VOLTAREN) 1 % Gel, Apply 2 g topically 4 (four) times a day. To shoulder, Disp: 300 g, Rfl: 0     hydrOXYzine pamoate (VISTARIL) 25 MG capsule, Take 1 capsule (25 mg total) by mouth 4 (four) times a day as needed for anxiety., Disp: 56 capsule, Rfl: 0     naloxone (NARCAN) 4 mg/actuation nasal spray, 1 spray (4 mg dose) into one nostril for opioid reversal. Call 911. May repeat if no response in 3 minutes., Disp: 1 Box, Rfl: 0    Current Facility-Administered Medications:      albuterol nebulizer solution 2.5 mg (PROVENTIL), 2.5 mg, Inhalation,  "Once, Loida Stockton MD     cyanocobalamin injection 1,000 mcg, 1,000 mcg, Intramuscular, Q30 Days, Loida Stockton MD, 1,000 mcg at 05/28/19 0912  He is alert with a clear sensorium good eye contact.  Coughs several times noting she does not have a cold but her COPD.  Ambulates with cane.  Speech is normal rate and rhythm.  She is quite verbal.         Objective:     Vitals:    10/15/19 1256   BP: 138/84   Pulse: 96   Weight: (!) 263 lb (119.3 kg)   Height: 5' 6\" (1.676 m)   PainSc:   6   PainLoc: Shoulder         Impression, patient with multiple medical problems and orthopedic concerns.  Had been maintained on hydrocodone.    Has a history of chemical dependency with a relapse over the last year with some insight to relapse dynamics.  Additional medical concerns of COPD and sleep apnea raise concerns were ongoing opioids.  She did have a trial of the Butrans patch, and she described feeling uncomfortable with the dosage that could not be changed with symptoms.    She is actively followed by a psychiatrist and reports she is stable in terms of her mood disorder presently.    Recommendations, I discussed the role of buprenorphine as being relatively safer with these above concerns.  Discussed other forms such as Suboxone in which could be adjusted, or the bill Bhavesh film.    She also expressed some interest in medical cannabis, which would avoid some of the respiratory concerns of the opioids.  Would discuss with Mabel Merida her perspective chemical dependency concern.    Similarly we discussed the role of ketamine would be relatively safer for her pulmonary concerns and again would need to discuss with Mabel Merida and with her psychiatrist.    She will sign a release of information for me to speak with Dr. Olivera.    I did prescribe hydrocodone 5 mg twice a day to continue limited prescriptions well versus adjusted.    Time spent more than 40 minutes face-to-face, 50% count about above condition coronation " treatment plan        This note has been dictated using voice recognition software. Any grammatical or context distortions are unintentional and inherent to the software

## 2021-06-02 NOTE — TELEPHONE ENCOUNTER
Pended lab orders spoke with pt and scheduled her for a lab only appt on 10/10/2019. Pt is still planning on seeing PCP on 10/16/2019

## 2021-06-02 NOTE — TELEPHONE ENCOUNTER
A prescription for a azithromycin along with prednisone was sent.  No controlled substances are ordered over the phone any longer.    She can try 12-hour Delsym

## 2021-06-02 NOTE — PROGRESS NOTES
"[facility] Follow Up Note    Assessment/Plan:  1. Chronic obstructive pulmonary disease with acute exacerbation (H)  Continues with cough and diffuse wheezing.  Secretions decreased with recent azithromycin use.  Oxygen levels as noted.  Recommendation: Will check chest x-ray to rule out pneumonia.  Will continue a azithromycin for an additional 3 days.  Will do tapered prednisone.  Additionally, will add Atrovent as a rescue inhaler.  - predniSONE (DELTASONE) 10 mg tablet; Take 40mg by mouth daily for 3 days, then 30mg x 3 days, then 20mg x 3 days ,then 10mg x 3 days then stop.  Dispense: 30 tablet; Refill: 0  - azithromycin (ZITHROMAX) 500 MG tablet; Take 1 tablet (500 mg total) by mouth daily for 3 days.  Dispense: 3 tablet; Refill: 0  - benzonatate (TESSALON PERLES) 100 MG capsule; Take 1 capsule (100 mg total) by mouth 3 (three) times a day as needed for cough.  Dispense: 30 capsule; Refill: 0  - XR Chest 2 Views  - ipratropium (ATROVENT HFA) 17 mcg/actuation inhaler; Inhale 2 puffs 4 (four) times a day.  Dispense: 1 Inhaler; Refill: 2    2. Morbid Obesity  Once again, have encouraged diet journaling and weight loss.  \"I know how to lose weight \"however, patient has marked difficulty with this.  She eats large volumes of carbohydrate.  She does not exercise, etc.    3. Pulmonary hypertension (H)  A very thorough review of the care everywhere chart from the [facility] is reviewed with her.  She has moderate pulmonary hypertension as diagnosed by both echocardiogram and right heart catheter.  A recent CT scan also suggest pulmonary hypertension-now quantified as moderate  She also has erythrocytosis and underlying history of hemochromatosis.  It appears that she is undergoing an evaluation for secondary causes of pulmonary hypertension.  She has had 3 phlebotomies.  Additionally, she is scheduled to see a pulmonologist with regard to her obstructive sleep apnea.  She also needs to see " pulmonologist with regard to COPD.  Finally, she does not have coronary artery disease.    Awaiting next steps by cardiology.  Of note, blood pressure has been elevated more recently.  However, she has been under a great deal of stress in her depression is exacerbated.  She is currently with mild respiratory distress from her pulmonary issue.  Will await the addition of medications.    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  She is working with endocrinology.  Will hold on initiation of antihypertensive medication as she is markedly wheezing today and in some respiratory distress.  Would like to bring her back for repeat blood pressure check.    5. History of breast cancer  Encouraged regular mammography.    6. Vitamin B12 deficiency  Levels are not low    7. Opioid use agreement exists  She is working with Dr. Dubois with both psychiatric and pain issues.  - naloxone (NARCAN) 4 mg/actuation nasal spray; 1 spray (4 mg dose) into one nostril for opioid reversal. Call 911. May repeat if no response in 3 minutes.  Dispense: 1 Box; Refill: 0      Time spent today more than 45 minutes with more than half that time spent in counseling and discussion of COPD exacerbation and also the definition of pulmonary hypertension.  Detailed record review was performed with her as well.  Manual review of chest x-ray was performed as well    Loida Stockton MD    Chief Complaint:  Chief Complaint   Patient presents with     Follow-up     Test Results       History of Present Illness:  Randi is a 64 y.o. female who is here today for evaluation of a cough and COPD exacerbation.  Of note she contact me if few days ago with cough, significant sputum production and respiratory distress.  It was not severe enough to warrant an emergency room visit.  She was started on prednisone and a azithromycin with marked improvement of sputum production.  However, she states she  "is still wheezing.  She is requesting a narcotic cough suppressant.  Of note, I did remind her that she has a controlled substance agreement and is on Vicodin.  She will not be given additional narcotics.  Additionally, she has been using Mucinex.  It has helped minimally.    She denies fever or chills.    Of note, with regard to her pulmonary hypertension which was diagnosed in June of this year, she has visited with a cardiologist at the [place] who specializes in this.  She has had a right heart cath and diagnostic angiogram as well.  Fortunately, she has no coronary artery disease but has moderate pulmonary hypertension.  It appears that she is going through an evaluation to determine whether this is primary or secondary.  Of note, she has significant pulmonary problems and erythrocytosis.  She is being followed by hematology for this as well.  Of note, all of these consultations have been reviewed with her.    Since initiation of this evaluation, she has had 3 phlebotomy visits for elevated hematocrit and erythrocytosis.  Of note, she carries a gene for hemochromatosis and currently has normal ferritin levels.  She had previously received phlebotomy in the past but her hematocrit, until recently has been fine.  Additionally, she is scheduled for pulmonary/sleep evaluation and an endocrine evaluation as \"she is experiencing rage \"-similar to when her pheochromocytoma was active.  However, she is on minimal medications for her depression and anxiety.  She is sleeping poorly.    Diagnostics have been reviewed with her in great depth.  We have reviewed laboratory studies that were drawn prior to this visit including thyroid functions, vitamin D and B12 levels.  Nothing was severely out of range.  Have reviewed labs from the [facility].  Have reviewed CTA of the chest, coronary angiogram, echocardiogram, etc.    Review of Systems:  A comprehensive review of systems was performed and was otherwise " negative    PFSH:  Social History: She is .  Her  is currently undergoing an evaluation for a renal mass.  She is quite anxious and agitated  Social History     Tobacco Use   Smoking Status Former Smoker     Last attempt to quit: 2003     Years since quittin.2   Smokeless Tobacco Never Used       Past History:   Past Medical History:   Diagnosis Date     Anxiety      Breast cancer (H)      Chronic pain syndrome      COPD (chronic obstructive pulmonary disease) (H)      Depression      Esophageal reflux      Graves disease      Hemochromatosis      Hx antineoplastic chemotherapy      Hx of radiation therapy      Hypertension      Impaired fasting glucose      Pheochromocytoma      Psoriasis      Psoriatic arthropathy (H)      Restless leg syndrome      Right rotator cuff tear      Sleep apnea     CPAP     Spinal stenosis      Urinary incontinence      Vitamin B12 deficiency        Current Outpatient Medications   Medication Sig Dispense Refill     albuterol (PROVENTIL) 2.5 mg /3 mL (0.083 %) nebulizer solution Take 3 mL (2.5 mg total) by nebulization every 6 (six) hours as needed for wheezing. 75 mL 12     aspirin 325 MG EC tablet Take 325 mg by mouth daily as needed for pain.       budesonide-formoterol (SYMBICORT) 160-4.5 mcg/actuation inhaler Inhale 2 puffs 2 (two) times a day. 3 Inhaler 3     cholecalciferol, vitamin D3, (VITAMIN D3) 5,000 unit Tab Take 10,000 Units by mouth daily.       cyanocobalamin, vitamin B-12, 5,000 mcg Subl Place 5,000 mcg under the tongue daily.       desvenlafaxine succinate (PRISTIQ) 50 MG 24 hr tablet 1 tablet daily.       diclofenac sodium (VOLTAREN) 1 % Gel Apply 2 g topically 4 (four) times a day. To shoulder 300 g 0     HYDROcodone-acetaminophen 5-325 mg per tablet Take 1 tablet by mouth 2 (two) times a day as needed for pain. 28 tablet 0     hydrOXYzine pamoate (VISTARIL) 25 MG capsule Take 1 capsule (25 mg total) by mouth 4 (four) times a day as  needed for anxiety. 56 capsule 0     levothyroxine (SYNTHROID, LEVOTHROID) 175 MCG tablet Take 1 tablet daily for 6 days of the week, 7th day PRN 14 tablet 0     naloxone (NARCAN) 4 mg/actuation nasal spray 1 spray (4 mg dose) into one nostril for opioid reversal. Call 911. May repeat if no response in 3 minutes. 1 Box 0     omeprazole (PRILOSEC) 20 MG capsule TAKE 1 CAPSULE BY MOUTH 2 TIMES A  capsule 3     pramipexole (MIRAPEX) 1 MG tablet Take 0.5-1 tablets (0.5-1 mg total) by mouth 2 (two) times a day. 60 tablet 5     triamterene-hydrochlorothiazide (MAXZIDE-25) 37.5-25 mg per tablet Take 1 tablet by mouth daily. 90 tablet 3     VITAMIN K2 ORAL Take 300 mcg by mouth daily.       azithromycin (ZITHROMAX) 500 MG tablet Take 1 tablet (500 mg total) by mouth daily for 3 days. 3 tablet 0     benzonatate (TESSALON PERLES) 100 MG capsule Take 1 capsule (100 mg total) by mouth 3 (three) times a day as needed for cough. 30 capsule 0     ipratropium (ATROVENT HFA) 17 mcg/actuation inhaler Inhale 2 puffs 4 (four) times a day. 1 Inhaler 2     predniSONE (DELTASONE) 10 mg tablet Take 40mg by mouth daily for 3 days, then 30mg x 3 days, then 20mg x 3 days ,then 10mg x 3 days then stop. 30 tablet 0     Current Facility-Administered Medications   Medication Dose Route Frequency Provider Last Rate Last Dose     albuterol nebulizer solution 2.5 mg (PROVENTIL)  2.5 mg Inhalation Once Loida Stockton MD         cyanocobalamin injection 1,000 mcg  1,000 mcg Intramuscular Q30 Days Loida Stockton MD   1,000 mcg at 05/28/19 0912       Family History: Hemochromatosis and siblings.  There is a history of substance abuse    Physical Exam:  General Appearance:   She appears mildly short of breath.  She is very chatty.  She perseverates on specific issues such as her  and friends health care.  Speech is occasionally tangential.  Vitals:    10/16/19 1236   BP: 154/82   Patient Site: Left Arm   Patient Position: Sitting    Cuff Size: Adult Large   Pulse: 80   SpO2: 97%   Weight: (!) 264 lb (119.7 kg)     Wt Readings from Last 3 Encounters:   10/16/19 (!) 264 lb (119.7 kg)   10/15/19 (!) 263 lb (119.3 kg)   09/12/19 (!) 263 lb (119.3 kg)     Body mass index is 42.61 kg/m .    Head neck exam is negative for focal neurologic deficits  Lung exam reveals diffuse wheezing throughout  Cardiac exam reveals tachycardia  Skin exam is negative for rashes  Extremities are negative for edema  She ambulates slowly.  She needs assistance getting up onto the table    Data Review:    Analysis and Summary of Old Records (2): Detailed review of care everywhere notes including cardiology, endocrinology and hematology    Records Requested (1):       Other History Summarized (from other people in the room) (2):     Radiology Tests Summarized (XRAY/CT/MRI/DXA) (1): Have reviewed coronary angiogram, chest CT    Labs Reviewed (1): Have reviewed labs    Medicine Tests Reviewed (EKG/ECHO/COLONOSCOPY/EGD) (1):     Independent Review of EKG or X-RAY (2):     Time spent in excess of 40 minutes with more than half that time spent in counseling and discussion of her work-up at the [facility] and treatment for COPD

## 2021-06-02 NOTE — PROGRESS NOTES
New consult with Dr Dubois, patient reports bilateral shoulder pain. Pain rating a 6, functional score 8.

## 2021-06-02 NOTE — TELEPHONE ENCOUNTER
RN triage   Call from pt   Pt states she has COPD   Pt is requesting antibiotic, prednisone and codeine cough medication   Pt states she has had cough and wheeze for months -- in the past week now had a ' cold' --  has cough and cold too  Is coughing mucus -- no blood  No fever   no severe diff breathing  Using albuterol nebulizer two times a day -- helps a little  Pain meds have been changed -- now taking hydrocodone 2x/day   Pt states she has no transportation today   Reviewed home care advice  Per protocol = should be seen   Pt requesting prescription instead --   Pt has PCP appt scheduled for next week   Please advise   Tova Corral RN [facility] RN triage      Reason for Disposition    Wheezing is present    Protocols used: COUGH-A-OH       Statement Selected

## 2021-06-02 NOTE — TELEPHONE ENCOUNTER
Upcoming Appointment Question  When is the appointment: 10/16/19  What is your appointment for?: D[facility] the lab tests requested below  Who is your appointment scheduled with?: Dr. Stockton  What is your question/concern?: Patient would like to have lab appointment on 10/10/19 at the clinic to check her thyroid, Vitamins D, B-12, K and Folic Acid levels.  (She will have her car that day.)  Okay to leave a detailed message?: Yes

## 2021-06-02 NOTE — PATIENT INSTRUCTIONS - HE
PLAN:    Sign PAULETTE for Dr. Dubois to speak to Dr. Zach Dubois to discuss with Anne-Marie Monroe    Discussed Suboxone, may begin after above discussions    Reviewed alternatives such as ketamine, medical cannabis    Return to Dr. Dubois in 5-6 weeks

## 2021-06-02 NOTE — TELEPHONE ENCOUNTER
RN cannot approve Refill Request    RN can NOT refill this medication med is not covered by policy/route to provider. Last office visit: 10/16/2019 Loida Stockton MD Last Physical: 5/28/2019 Last MTM visit: Visit date not found Last visit same specialty: 10/16/2019 Loida Stockton MD.  Next visit within 3 mo: Visit date not found  Next physical within 3 mo: Visit date not found      Jennifer Calero, [facility] Triage/Med Refill 11/1/2019    Requested Prescriptions   Pending Prescriptions Disp Refills     benzonatate (TESSALON) 100 MG capsule [Pharmacy Med Name: BENZONATATE 100 MG CAPSULE] 30 capsule 0     Sig: TAKE 1 CAPSULE BY MOUTH THREE TIMES A DAY AS NEEDED FOR COUGH       There is no refill protocol information for this order

## 2021-06-02 NOTE — PROGRESS NOTES
Outpatient Mental Health Treatment Plan    Name:  Randi Damon  :  1955  MRN:  [id number]    Treatment Plan: updated Treatment Plan  Intake/initial treatment plan date:  2019  Benefit and risks and alternatives have been discussed: Yes  Is this treatment appropriate with minimal intrusion/restrictions: Yes  Estimated duration of treatment:  Approximately 20+ sessions.  Anticipated frequency of services:  Every 1-2 weeks  Necessity for frequency: This frequency is needed to establish therapeutic goals and for continuity of care in order to monitor progress.  Necessity for treatment: To address cognitive, behavioral, and/or emotional barriers in order to work toward goals and to improve quality of life.     Plan:      ? Depression                 Goal:    Decrease average depression level from 23 to 10 or below.              Strategies:       ?[x]? Decrease social isolation                                      [x]? Increase involvement in meaningful activities                                      ?[x]? Discuss sleep hygiene                                      ?[x]? Explore thoughts and expectations about self and others                                      ?[x]? Process grief (loss of significant person, independence, role, etc.)                                      ?[x]? Assess for suicide risk                                      ?[x]? Implement physical activity routine (with physician approval)                                      [x]? Consider introduction of bibliotherapy and/or videos                                      [x]? Emotion regulation (DBT skills)                                      X- self-care strategies                                      X- communication strategies                                      X- Cognitive Restructuring                                      X- Maintain medication compliance                                       X- regular attendance with  Psychiatrist                                                              X- grief/loss                                                              ?  ?Degree to which this is a problem: 4  Degree to which goal is met: 1  Date of Review: 10/31/2019                                                                                ?              ? Anxiety/PTSD         Goal:    Decrease average anxiety level from 12 to 6 or below.              Strategies:       ? [x]?Learn and practice relaxation techniques and other coping strategies (e.g., thought stopping, reframing, meditation)                                      ? [x]? Increase involvement in meaningful activities                                      ? [x]? Discuss sleep hygiene                                      ? [x]? Explore thoughts and expectations about self and others                                      ? [x]? Identify and monitor triggers for panic/anxiety symptoms                                      ? [x]? Implement physical activity routine (with physician approval)                                      ? [x]? Consider introduction of bibliotherapy and/or videos                                      ? [x]? Continue compliance with medical treatment plan (or explore barriers)                                                  X- Anger management strategies                                      X- stress management skills                                         X- impulse control skills                                            X- consider EMDR                                   Degree to which this is a problem: 4  Degree to which goal is met: 1  Date of Review: 10/31/2019     Chronic pain  Goal:    ? Decrease average pain level from 8/10  to 5/10.                 Strategies:       ? [x]? Explore thoughts and expectations about self and others                                                                  [x]? Explore emotional reactions to  illness/injury                                       ? [x]? Learn and practice relaxation techniques and other coping strategies                                             [x]? Implement physical activity routine (with physician approval)                                      ? [x]? Engage in values clarification and goal-setting                                      ? [x]? Consider introduction of bibliotherapy and/or videos                                      ? [x]? Increase involvement in meaningful activities                                      ? [x]? Discuss sleep hygiene                                      ? [x]? Process grief (loss of significant person, independence, role   etc.)                                      ? [x]? Assess for suicide risk                                      ?  Degree to which this is a problem: 4  Degree to which goal is met: 1  /Date of Review: 10/31/2019     Functional Impairment:  1=Not at all/Rarely  2=Some days  3=Most Days  4=Every Day    Personal : 4  Family : 4  Social : 4   Work/school : 4     Diagnosis:  Major Depression, recurrent, severe  Generalized Anxiety Disorder  Chronic PTSD  R/O Bipolar Disorder     WHODAS 2.0 12-item version 50%  H1= 30  H2= 30  H3= 30     Scores presented in qualifiers to represent level of disability.     NO problem - (none, absent, negligible,  ) - 0-4 %   MILD problem - (slight, low, ) - 5-24 %   MODERATE problem - (medium, fair,...) - 25-49 %   SEVERE problem - (high, extreme,  ) - 50-95 %   COMPLETE problem - (total, ) -  %           Clinical assessments and measures completed:. CHAVO-7, PHQ-9, Mood Questionnaire current, CAGE-AID and PANSI     Strengths:  Motivated, intelligent, insightful   Limitations:  Physical Limitation, lack of support system   Cultural Considerations: Pt is a 64 year old, marrried,  female w/ hx of trauma & chronic pain.      Persons responsible for this plan: Patient and  Provider          Psychotherapist Signature           Patient Signature:              Guardian Signature             Provider: Performed and documented by ASHKAN Brandon   Date:  11/1/2019

## 2021-06-02 NOTE — PATIENT INSTRUCTIONS - HE
Plan:   Plan of Care / NextSteps:     Follow up by: keep the appt with Dr. Dubois      Education:  We are now titled [facility].    Please call Monday-Friday for problems or questions and one of the clinical support staff (CSS) will help to get things figured out. The number is [phone].     Vysr is a means to send an e-mail (SputnikBot message) to communicate any concerns.     Please remember some issues require an office visit.     Today we reviewed the plan of care and answered questions.      Records: Reviewed to assist with preparation for the office visit and are reflected throughout the note.    Primary Care: You need to have an annual physical and check in with your primary care folks on a regular basis. Talk with your primary care about the frequency of expected visits.     Behavioral Medicine: Continue with Mabel Merida    Rehabilitation: you are planning to connect with PT    Consultation/Specialists: you will continue with your specialists    Medication prescribed / to be continued: You will  the Suboxone and start

## 2021-06-02 NOTE — PROGRESS NOTES
Mental Health Visit Note    10/15/2019    Start time: 1400    Stop Time: 1450   Session # 11    Session Type: Patient is presenting for an Individual session.    Randi Damon is a 64 y.o. female is being seen today for    Chief Complaint   Patient presents with      Follow Up     Depression     Anxiety   .     New symptoms or complaints: None    Functional Impairment:   Personal: 4  Family: 4  Work:  NA  Social:4    Clinical assessment of mental status: Patient presented alert and oriented x3.  She was well-groomed.  Her attire was appropriate.  Patient was cooperative.  Patient motor actively was normal and eye contact appropriate.  Patients mood was anxious and affect congruent.  Patient s speech and language was normal.  Patient attention and thought process normal.  Concentration was appropriate.  Patient denied delusions or hallucinations. Patient denied suicidal or homicidal ideation.  Patient denied any long or short term memory problems. No evidence of impairment in judgment. She has insight and fund of knowledge was adequate.        Suicidal/Homicidal Ideation present: None Reported This Session    Patient's impression of their current status: Pt reports significant psychosocial stressors impacting mental & physical health.      Therapist impression of patients current state: Processed current psychosocial stressors and current coping strategies.  Processed current self-care and impact of mental & physical health on daily functioning.  Discussed concerns of health issues and coping with chronic pain.  Pt reports no use of alcohol, but its been difficult for her.  Discussed healthy coping skills & recovery.     Type of psychotherapeutic technique provided: Insight oriented, Client centered, Solution-focused and CBT    Progress toward short term goals:Progress as expected, coping skills, self-care    Review of long term goals: Treatment Plan updated    Diagnosis:   1. Bipolar affective disorder,  currently depressed, moderate (H)    2. Chronic post-traumatic stress disorder (PTSD)    3. Generalized anxiety disorder    4. Chronic pain syndrome        Plan and Follow up: Follow up with Mabel in 1-2 weeks  Practice self-care and healthy coping skills as discussed  Follow up with all providers as scheduled        Discharge Criteria/Planning: Patient will continue with follow-up until therapy can be discontinued without return of signs and symptoms.    Shanice Merida 10/18/2019

## 2021-06-02 NOTE — PROGRESS NOTES
Patient here to see Anne-Marie Little for bilateral shoulder pain, rates pain a 6 with functional score of 7.

## 2021-06-02 NOTE — PROGRESS NOTES
Mental Health Visit Note    10/31/2019   Start time: 1300    Stop Time: 1350   Session # 12    Session Type: Patient is presenting for an Individual session.    Randi Damon is a 64 y.o. female is being seen today for    Chief Complaint   Patient presents with      Follow Up     Depression     Anxiety   .     New symptoms or complaints: None    Functional Impairment:   Personal: 4  Family: 4  Work: 4  Social:4    Clinical assessment of mental status: Patient presented alert and oriented x3.  She was well-groomed.  Her attire was appropriate.  Patient was cooperative.  Patient motor actively was normal and eye contact appropriate.  Patients mood was anxious and affect congruent.  Patient s speech and language was normal.  Patient attention and thought process normal.  Concentration was appropriate.  Patient denied delusions or hallucinations. Patient denied suicidal or homicidal ideation.  Patient denied any long or short term memory problems. No evidence of impairment in judgment.  She has insight and fund of knowledge was adequate.        Suicidal/Homicidal Ideation present: None Reported This Session    Patient's impression of their current status: Pt reports psychosocial stressors impacting mental & physical health.     Therapist impression of patients current state: Processed current psychosocial stressors of personal health, husbands health and current coping strategies.  Discussed self-care and sobriety struggles.  Processed communication and assertiveness skills. Updated assessments and treatment plan today.    Type of psychotherapeutic technique provided: Insight oriented, Client centered, Solution-focused and CBT    Progress toward short term goals:Progress as expected, self-care, coping skills    Review of long term goals: Treatment Plan updated    Diagnosis:   1. Bipolar affective disorder, currently depressed, moderate (H)    2. Chronic post-traumatic stress disorder (PTSD)    3. Generalized  anxiety disorder    4. Chronic pain syndrome        Plan and Follow up: Follow up with Mabel in 1-2 weeks  Practice self-care and healthy coping skills as discussed  Follow up with all providers as scheduled      Discharge Criteria/Planning: Patient will continue with follow-up until therapy can be discontinued without return of signs and symptoms.    Shanice Merida 11/1/2019
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
## 2021-06-03 VITALS — BODY MASS INDEX: 41.78 KG/M2 | HEIGHT: 66 IN | WEIGHT: 260 LBS

## 2021-06-03 VITALS — HEIGHT: 66 IN | BODY MASS INDEX: 42.27 KG/M2 | WEIGHT: 263 LBS

## 2021-06-03 VITALS — WEIGHT: 263 LBS | HEIGHT: 66 IN | BODY MASS INDEX: 42.27 KG/M2

## 2021-06-03 VITALS — WEIGHT: 260 LBS | BODY MASS INDEX: 41.78 KG/M2 | HEIGHT: 66 IN

## 2021-06-03 VITALS
BODY MASS INDEX: 42.61 KG/M2 | SYSTOLIC BLOOD PRESSURE: 154 MMHG | OXYGEN SATURATION: 97 % | DIASTOLIC BLOOD PRESSURE: 82 MMHG | HEART RATE: 80 BPM | WEIGHT: 264 LBS

## 2021-06-03 VITALS — BODY MASS INDEX: 41.64 KG/M2 | WEIGHT: 258 LBS

## 2021-06-03 VITALS — HEIGHT: 66 IN | WEIGHT: 267.8 LBS | BODY MASS INDEX: 43.04 KG/M2

## 2021-06-03 VITALS — HEIGHT: 66 IN | BODY MASS INDEX: 41.78 KG/M2 | WEIGHT: 260 LBS

## 2021-06-03 VITALS — BODY MASS INDEX: 42.27 KG/M2 | WEIGHT: 263 LBS | HEIGHT: 66 IN

## 2021-06-03 VITALS — WEIGHT: 258 LBS | BODY MASS INDEX: 41.64 KG/M2

## 2021-06-03 VITALS — BODY MASS INDEX: 42.25 KG/M2 | HEIGHT: 66 IN | WEIGHT: 262.9 LBS

## 2021-06-03 VITALS — WEIGHT: 259 LBS | BODY MASS INDEX: 41.8 KG/M2

## 2021-06-03 VITALS — HEIGHT: 66 IN | WEIGHT: 258 LBS | BODY MASS INDEX: 41.46 KG/M2

## 2021-06-03 NOTE — TELEPHONE ENCOUNTER
Winnie would like Dr. Morillo to order a different inhaler she has completed her Symbicort and said that Dr. Morillo had mention a different inhaler might work better for her. Will send a message to dr. Morillo.

## 2021-06-03 NOTE — TELEPHONE ENCOUNTER
"Timothy calls to describe that with the ketamine, because restless legs, was concerned about the cost.  Describes trying the Suboxone at the 2 mg dose she was \"high\" by which she seems to be more that she was dizzy had trouble walking from bed to the bathroom.  The Butrans patch 20 mcg was not sufficient.  We reviewed different dosages of buprenorphine, can try in the bill Bhavesh film to see if this is more helpful for pain and better tolerated.  Will start with 300 mcg twice a day.  "

## 2021-06-03 NOTE — TELEPHONE ENCOUNTER
Today patient leaves another message with concerns. Had appt today, with Mabel. The Ketamine, can't afford. Will cost the same as the Butrans. Didn't have any issues with Butrans, maybe should try that again. The Ketamine causes restless legs, jittery.

## 2021-06-03 NOTE — TELEPHONE ENCOUNTER
Winnie would like Dr. Morillo to know she has not been able to attend cardiac or pulmonary rehab. Said she has two torn rotator cuffs and is on chronic pain medication and rehab is not doable at this time. Is concerned about getting the right inhaler to help with her breathing, Message sen to dr. Morillo.

## 2021-06-03 NOTE — PROGRESS NOTES
Mental Health Visit Note    11/14/2019   Start time: 1400    Stop Time: 1450   Session # 13    Session Type: Patient is presenting for an Individual session.    Randi Damon is a 64 y.o. female is being seen today for    Chief Complaint   Patient presents with      Follow Up     PTSD     Depression     Anxiety   .     New symptoms or complaints: None    Functional Impairment:   Personal: 4  Family: 4  Work: 4  Social:4    Clinical assessment of mental status: Patient presented alert and oriented x3.  She was well-groomed.  Her attire was appropriate.  Patient was cooperative.  Patient motor actively was normal and eye contact appropriate.  Patients mood was anxious and affect congruent.  Patient s speech and language was normal.  Patient attention and thought process normal.  Concentration was appropriate.  Patient denied delusions or hallucinations. Patient denied suicidal or homicidal ideation.  Patient denied any long or short term memory problems. No evidence of impairment in judgment.   She has insight and fund of knowledge was adequate.        Suicidal/Homicidal Ideation present: None Reported This Session    Patient's impression of their current status: Pt reports psychosocial stressors greatly impacting mental health symptoms.     Therapist impression of patients current state: Processed current stressors including chronic pain symptoms and health issues.  Pt struggling with coping with chronic pain and discussed healthy strategies and self-care. Processed ability to communicate with friends, family & medical providers.     Type of psychotherapeutic technique provided: Insight oriented, Client centered, Solution-focused and CBT    Progress toward short term goals:Progress as expected, self-care, coping skills, communication skills    Review of long term goals: Date of last review 10/31/2019    Diagnosis:   1. Bipolar affective disorder, currently depressed, moderate (H)    2. Chronic post-traumatic  stress disorder (PTSD)    3. Generalized anxiety disorder    4. Chronic pain syndrome        Plan and Follow up: Follow up with Mabel in 1-2 weeks  Follow up with providers as scheduled  Practice self-care and practice coping skills as discussed      Discharge Criteria/Planning: Patient will continue with follow-up until therapy can be discontinued without return of signs and symptoms.    Shanice Merida 11/22/2019

## 2021-06-03 NOTE — TELEPHONE ENCOUNTER
Central PA team  [phone]  Pool: HE PA MED ([id number])          PA has been initiated.       PA form completed and faxed insurance via Cover My Meds     Key:  [id number]     Medication:  BELBUCA 300MCG    Insurance:  [employer]         Response will be received via fax and may take up to 5-10 business days depending on plan

## 2021-06-03 NOTE — TELEPHONE ENCOUNTER
"Patient left 2 messages today, totaling 8 min.   DR Dubois ordered Belbuca. Went to p/u and pharmacy didn't have it. Was told by that [facility], that the  patient is responsible to find a pharmacy that has it. She ws upset about this. Thinks not doing their job.  Found [facility] [address] that [facility] med, but told would require PA. Found out from that pharmacy, that Suboxone would be covered. States Is out of meds, however then said, has 2 Butrans patches left. Is going to put a patch on, \"until Dr Dubois figures out what she should be taking\". Patient thinks maybe should try Suboxone again.      She then said is coming to Lung clinic this afternoon, and is going to stop in pain clinic, \"even through that won't do any good.\"    Wants us to know will not use [facility] [place] anymore.  "

## 2021-06-03 NOTE — TELEPHONE ENCOUNTER
Patient calls to discuss on the Suboxone had nausea and vomiting.  Wanted to go back on the hydrocodone twice a day until she has her surgeries addressed.    I reviewed with her we made the change from hydrocodone due to concerns with alcohol use.  She states she is now sober.  Discussed Suboxone could be tried at lower dose.  She is worried that she is read that that can be difficult to detoxify from.    We reviewed the use of ketamine.  She was concerned about some of the side effects he read about a we discussed starting a very low doses of increasing gradually as tolerated.  She is interested in pursuing this.  She would also like to look into the medical cannabis program to see if this helps with anxiety and this was supported.

## 2021-06-03 NOTE — TELEPHONE ENCOUNTER
Who is calling:  Patient   Reason for Call:  Same day lab changed from 1:15 pm to 3:00 pm 11/4.  Okay to leave a detailed message: No

## 2021-06-03 NOTE — TELEPHONE ENCOUNTER
RN cannot approve Refill Request    RN can NOT refill this medication med is not covered by policy/route to provider. Last office visit: 10/16/2019 Loida Stockton MD Last Physical: 5/28/2019 Last MTM visit: Visit date not found Last visit same specialty: 10/16/2019 Loida Stockton MD.  Next visit within 3 mo: Visit date not found  Next physical within 3 mo: Visit date not found      Romana Dugan, [facility] Triage/Med Refill 11/22/2019    Requested Prescriptions   Pending Prescriptions Disp Refills     benzonatate (TESSALON) 100 MG capsule [Pharmacy Med Name: BENZONATATE 100 MG CAPSULE] 30 capsule 0     Sig: TAKE 1 CAPSULE BY MOUTH 3 TIMES A DAY AS NEEDED FOR COUGH       There is no refill protocol information for this order

## 2021-06-03 NOTE — TELEPHONE ENCOUNTER
Pt had BMP drawn at [facility] today.  There was some confusion on what was ordered as she thought order was coming from [facility] Dr. Lim?  I called Dr. Stockton's office to verify lab ordered is what's needed.  Her care team couldn't verify and Dr. Stockton is gone for the day.  The pt is requesting that the BMP be sent to Dr. Lim.

## 2021-06-04 ENCOUNTER — RECORDS - HEALTHEAST (OUTPATIENT)
Dept: HEALTH INFORMATION MANAGEMENT | Facility: CLINIC | Age: 66
End: 2021-06-04

## 2021-06-04 ENCOUNTER — TRANSFERRED RECORDS (OUTPATIENT)
Dept: NEUROSURGERY | Facility: CLINIC | Age: 66
End: 2021-06-04

## 2021-06-04 ENCOUNTER — TRANSFERRED RECORDS (OUTPATIENT)
Dept: HEALTH INFORMATION MANAGEMENT | Facility: CLINIC | Age: 66
End: 2021-06-04

## 2021-06-04 ENCOUNTER — OFFICE VISIT - HEALTHEAST (OUTPATIENT)
Dept: INTERNAL MEDICINE | Facility: CLINIC | Age: 66
End: 2021-06-04

## 2021-06-04 VITALS — BODY MASS INDEX: 44.44 KG/M2 | WEIGHT: 276.5 LBS | HEIGHT: 66 IN

## 2021-06-04 VITALS
OXYGEN SATURATION: 94 % | HEART RATE: 90 BPM | SYSTOLIC BLOOD PRESSURE: 132 MMHG | DIASTOLIC BLOOD PRESSURE: 76 MMHG | WEIGHT: 261 LBS | BODY MASS INDEX: 41.95 KG/M2 | HEIGHT: 66 IN

## 2021-06-04 VITALS
HEART RATE: 85 BPM | BODY MASS INDEX: 42.29 KG/M2 | RESPIRATION RATE: 19 BRPM | DIASTOLIC BLOOD PRESSURE: 76 MMHG | WEIGHT: 262 LBS | SYSTOLIC BLOOD PRESSURE: 130 MMHG | OXYGEN SATURATION: 95 %

## 2021-06-04 VITALS
BODY MASS INDEX: 43.93 KG/M2 | OXYGEN SATURATION: 96 % | HEART RATE: 106 BPM | RESPIRATION RATE: 24 BRPM | WEIGHT: 272.2 LBS | DIASTOLIC BLOOD PRESSURE: 62 MMHG | SYSTOLIC BLOOD PRESSURE: 116 MMHG

## 2021-06-04 VITALS — BODY MASS INDEX: 44.71 KG/M2 | HEIGHT: 66 IN

## 2021-06-04 VITALS — HEIGHT: 66 IN | BODY MASS INDEX: 44.71 KG/M2

## 2021-06-04 VITALS — WEIGHT: 258 LBS | BODY MASS INDEX: 41.46 KG/M2 | HEIGHT: 66 IN

## 2021-06-04 DIAGNOSIS — E53.8 VITAMIN B12 DEFICIENCY: ICD-10-CM

## 2021-06-04 DIAGNOSIS — K21.00 GASTROESOPHAGEAL REFLUX DISEASE WITH ESOPHAGITIS: ICD-10-CM

## 2021-06-04 DIAGNOSIS — E83.110 HEREDITARY HEMOCHROMATOSIS (H): ICD-10-CM

## 2021-06-04 DIAGNOSIS — E89.0 POSTABLATIVE HYPOTHYROIDISM: ICD-10-CM

## 2021-06-04 DIAGNOSIS — G89.29 CHRONIC INTRACTABLE PAIN: ICD-10-CM

## 2021-06-04 DIAGNOSIS — F32.2 SEVERE MAJOR DEPRESSION (H): ICD-10-CM

## 2021-06-04 DIAGNOSIS — J44.9 CHRONIC OBSTRUCTIVE PULMONARY DISEASE, UNSPECIFIED COPD TYPE (H): ICD-10-CM

## 2021-06-04 DIAGNOSIS — E11.9 TYPE 2 DIABETES MELLITUS WITHOUT COMPLICATION, WITHOUT LONG-TERM CURRENT USE OF INSULIN (H): ICD-10-CM

## 2021-06-04 DIAGNOSIS — M48.02 CERVICAL STENOSIS OF SPINAL CANAL: ICD-10-CM

## 2021-06-04 DIAGNOSIS — Z01.818 PREOP GENERAL PHYSICAL EXAM: ICD-10-CM

## 2021-06-04 DIAGNOSIS — G25.81 RESTLESS LEGS SYNDROME (RLS): ICD-10-CM

## 2021-06-04 DIAGNOSIS — G47.33 SLEEP APNEA, OBSTRUCTIVE: ICD-10-CM

## 2021-06-04 DIAGNOSIS — I15.2 HYPERTENSION DUE TO ENDOCRINE DISORDER: ICD-10-CM

## 2021-06-04 LAB
ATRIAL RATE - MUSE: 82 BPM
DIASTOLIC BLOOD PRESSURE - MUSE: NORMAL
INTERPRETATION ECG - MUSE: NORMAL
P AXIS - MUSE: 39 DEGREES
PR INTERVAL - MUSE: 168 MS
QRS DURATION - MUSE: 118 MS
QT - MUSE: 408 MS
QTC - MUSE: 476 MS
R AXIS - MUSE: 64 DEGREES
SYSTOLIC BLOOD PRESSURE - MUSE: NORMAL
T AXIS - MUSE: 35 DEGREES
VENTRICULAR RATE- MUSE: 82 BPM

## 2021-06-04 ASSESSMENT — MIFFLIN-ST. JEOR: SCORE: 1541.05

## 2021-06-04 NOTE — PROGRESS NOTES
Pulmonary Clinic Outpatient Consultation    Assessment and Plan:   This is a 64 y F with depression/anxiety, alcohol abuse, severe sleep apnea, and possible mild OHS not on CPAP, morbid obesity (BMI 42), 44+ pack year tobacco history in remission, COPD with mild osbtruction, GERD and hiatal hernia, chronic pain on opiods, hypothyroid after tx for Graves, pheochromocytoma resected ~2 years ago, who presents for follow-up of COPD and exertional dyspnea.     Also following w Dr. Edwards at the [facility], had RHC consistent with pulmonary venuous HTN, started on lasix.       PLAN by Issue:    1. COPD, GOLD A-B, mild obstruction  Has had issues with cost of inhalers. Currently on symbicort. No clear indication for inhaled steroid in her case, we changed to anoro.     Continue anoro and prn albuterol. She will let me know if she wants to change inhalers/pharmacies in the coming months with anticipated insurance changes    Suspect her COPD is a small component of her chronic dyspnea    She has a 44+ pack year smoking history, quit 20 years ago.     She is up to date on vaccinations    2. Pulmonary Nodule  4 mm, LLL. Stable since 4/2017, >2 years    No further scans indicated    She is not a candidate for lung cancer screening scans    3. Pulmonary Hypertension  Seen at the [facility] clinic and s/p RHC with mean PAP 31, and elevated wedge pressure consistent with pulmonary venuous HTN, likely in the setting of diastolic dysfunction, and likely component of untreated KYAW.     She was referred to sleep/PSG - seeing them 12/11    She was prescribed lasix, and cardiac rehab there    4. Exertional Dyspnea  Due to COPD, morbid obesity, deconditioning, HFpEF, PHTN    Adjusting inhaler as above    Has many chronic issues with depression, chronic pain, ideally needs aggressive weight loss plan.      I will see her back in 1 year. She will call me if COPD symptoms worsen/require closer follow-up.      Reina Morillo MD  Pulmonary and  "Critical Care Medicine  [facility]  Office: [phone]  Pager: [phone]    ----------------------    Reason for Consult: COPD    Interval HPI:   Missed multiple appts over the last few months.  In the interim, had RHC at the [facility] consistent with pulmonary venuous HTN. On lasix, and BP medications. Started cardiac rehab there, seeing sleep there on 12/11 for severe KYAW    Picked up anoro ~10 days ago, replaced symbicort. Not sure this is helping  Has not required albuterol  Breathing is \"terrible,\" but notes this is the same as its always been    --------------  This is a 64 y F with depression/anxiety, alcohol abuse, severe sleep apnea, not on CPAP, morbid obesity (BMI 42), 44+ pack year tobacco history in remission, COPD, GERD and hiatal hernia, chronic pain on opiods, hypothyroid after tx for Graves, pheochromocytoma resected ~2 years ago, who presents for an evaluation of COPD and exertional dyspnea.     She was very emotionally labile, tangential, and difficult to obtain a history from during our visit.     She reports she has had long standing ANNA. It has gotten worse over time, which she correlates with a 60+ lbs weight gain over the last 2 years. She has a chronic cough productive of clear phlegm. She notes significant depressive symptoms but no SI, feels no energy or motivation. She reports a recent relapse of alcohol abuse, but tells me she has been sober for 1 month however reported at [facility] visit 1 week ago she was drinking 2 bottles of wine daily.    She has severe KYAW per PSG but has not been using CPAP. She was referred to sleep again and has an appointment next month.    For her COPD she hs been on Symbicort intermittently due to cost, she just picked up another supply and has been back on for ~1 month. She is uncertain if it helps. She ran out of albuterol last month. CAT 31 today. Not certain of her last exacerbation. She quit smoking ~20 years ago.    She recently saw Dr." Grace at [facility] for a pulmonary HTN evaluation. She had an enlarged PA on a CT PE. The echo done there was unable to estimate PA pressure or diastolic function. It showed a mild reduction in RV function and was otherwise normal. She was referred for a sleep study and to hepatology for a history of possible hereditary hemochromatosis.     ROS:  A 12-system review was obtained and was negative with the exception of the symptoms endorsed in the history of present illness.    PMH:  Past Medical History:   Diagnosis Date     Anxiety      Breast cancer (H) 1994     Chronic pain syndrome      COPD (chronic obstructive pulmonary disease) (H)      Depression      Esophageal reflux      Graves disease      Hemochromatosis      Hx antineoplastic chemotherapy      Hx of radiation therapy      Hypertension      Impaired fasting glucose      Pheochromocytoma      Psoriasis      Psoriatic arthropathy (H)      Restless leg syndrome      Right rotator cuff tear      Sleep apnea 2017    CPAP     Spinal stenosis      Urinary incontinence      Vitamin B12 deficiency      Social Hx:  Tobacco: 2-3 PPD for 22 years, quit >20 years ago  Prior drug abuse, EtOH dependency, had been sober 20 years now relapsed, nothing in the last 1 month  Disabled, then retired, worked in   Currently lives in [place] with   1 dog, 1 cat   No travel in 2 years    Physical Exam:  /76   Pulse 85   Resp 19   Wt (!) 262 lb (118.8 kg)   SpO2 95% Comment: RA  BMI 42.29 kg/m    Gen: Alert, oriented, no distress  HEENT: nasal turbinates are unremarkable, no oropharyngeal lesions, no cervical or supraclavicular lymphadenopathy  CV: RRR, no M/G/R  Resp: Decreased, CTAB, no focal crackles or wheezes  Abd: obese, soft, nontender, no palpable organomegaly  Skin: no apparent rashes  Ext: no cyanosis, clubbing, no edema  Neuro: alert, nonfocal    Labs:  Reviewed  ABG 7.40/43    Imaging studies:  Personally reviewed:    6/24/19 CT  PE:  ANGIOGRAM CHEST: Atherosclerotic plaque including coronary artery calcification. No aortic aneurysm or dissection. Mildly prominent right and left main pulmonary arteries suggestive of pulmonary artery hypertension. No pulmonary emboli.     RV/LV RATIO: N/A     LUNGS AND PLEURA: Stable benign 4 mm pulmonary nodule pleural-based on image #77 at the left lung base. Mild to moderate scattered fibroatelectasis. Diffuse air trapping often associated with small vessel or small airways disease. No effusion.     MEDIASTINUM: Moderate sized hiatal hernia. No mass or adenopathy.     LIMITED UPPER ABDOMEN: Hepatic steatosis. Left adrenal mass on prior not imaged.     MUSCULOSKELETAL: Degenerative disease. Mastectomy with reconstruction as on prior.     CONCLUSION:  1.  Evidence for pulmonary artery hypertension. No pulmonary emboli, aortic aneurysm or dissection. Atherosclerotic plaque including coronary artery disease.  2.  Moderate-sized hiatal hernia.  3.  Hepatic steatosis.  4.  Remainder stable.    8/21/19 PFT's  FEV1/FVC 65 FEV1 72 FVC 87  Negative BD response  TLC 93  RV/  DLCO ralph 81%  FVL has scooping of expiratory limb consistent with obstruction    Mild obstruction  Normal lung volumes  Normal diffusion capacity    Right Heart Cath (from the [facility])  PA  Systolic:41 mmHg  Diasotlic: 24 mmHg  Mean: 31 mmHg  HR: 88 bpm  PA Sat: 70.8%    PCW  A-wave: 18 mmHg  V-wave: 17 mmHg  Mean: 16 mmHg  HR: 91 bpm    Cardiac Output  CO Jeannie: 5.72 L/min  CI Jeannie: 2.56 L/min/m2  CO TD: Not performed  CI TD: Not performed     8/13/19 Echo (care everywhere)  Unable to assess diastolic function  Interpretation Summary  1. Global and regional left ventricular function is normal with an EF of 55-  60%.  2. The right ventricle is normal size. Global right ventricular function is mildly reduced.  3. No significant valvular disease.  4. Unable to assess pulmonary artery pressure.  5. IVC diameter <2.1 cm collapsing >50%  with sniff suggests a normal RA  pressure of 3 mmHg.    There is no prior study for direct comparison.

## 2021-06-04 NOTE — TELEPHONE ENCOUNTER
FYI - Status Update  Who is Calling: carlos - cardiac rehab  Update: patient scored a 20 on her PHQ-9 depression survey and she agreed to a referral to there [facility] department. Patient to follow up with pcp at next visit. Will reach out if anything serious comes up.   Okay to leave a detailed message?:  Yes.

## 2021-06-04 NOTE — TELEPHONE ENCOUNTER
"Pt calls reporting she went to  her script for Butrans Patch at [facility] in [employer] and was given an \"empty package\". States she didn't realize there was no script in the bag until she got home. Advised pt to call the pharmacy and call back if problems.        "

## 2021-06-04 NOTE — PROGRESS NOTES
"Assessment/Plan:     Problem List Items Addressed This Visit     Psoriatic Arthropathy    Relevant Medications    diclofenac sodium (VOLTAREN) 1 % Gel    Anxiety state, unspecified    Relevant Medications    hydrOXYzine pamoate (VISTARIL) 25 MG capsule      Other Visit Diagnoses     Osteoarthritis, unspecified osteoarthritis type, unspecified site        Relevant Medications    diclofenac sodium (VOLTAREN) 1 % Gel    buprenorphine (BUTRANS) 10 mcg/hour PTWK patch            No follow-ups on file.    Patient Instructions   PLAN:    Continue Butrans patch 10 mcg weekly    May use hydroxyzine for nausea 25 mg one to two every six hours as needed    Continue Voltaren gel as needed    You are in process of completing Medical Cannabis enrollment    You will be meeting with orthopedic surgeon    Return in 8 weeks        Subjective:       64 y.o. female presents for note of chronic pain including shoulder pain, with concerns more recently around mood changes and alcohol use.    She describes with Suboxone having trials of 2 mg film taking quarter of a film.  On each occasion she felt dizzy, had a \"personality changes in that she felt more emotional or teary and overwhelmed.  Did not seem to help the pain.    She is now using the Butrans patch 10 mcg.  She is unclear how helpful it is been.  May have some nausea.    Reviews the ketamine seem to cause a sense of restless legs, and the feeling of \"want to take more\".  It did not help with the pain at that dose.    She reviews going to cardiac rehab at the [facility] yesterday being gone from 10 until 7.  With that activity she describes oversleeping today.  She will be doing the 8-week program 3 days a week.    She describes talking with her orthopedist, would like to schedule her right rotator cuff surgery when she completes the cardiac rehab.  Reviews this is been delayed for several years, having problems with COPD and other medical concerns.  Notes both shoulders are a " problem, the left side has a larger tear.  She anticipates after having the right shoulder surgery and using her left more and that will flareup.    She has been applying the Voltaren gel on several occasions.    She continues in the process for medical cannabis, needing to get some documentation about disability.    She hopes to be doing the pool therapy which she is done in the past for weight loss as well.    She reviews her 's dog has been put down and they are able to joke about it now in terms of how difficult it was before he .        Current Outpatient Medications:      albuterol (PROVENTIL) 2.5 mg /3 mL (0.083 %) nebulizer solution, Take 3 mL (2.5 mg total) by nebulization every 6 (six) hours as needed for wheezing., Disp: 75 mL, Rfl: 12     aspirin 325 MG EC tablet, Take 325 mg by mouth daily as needed for pain., Disp: , Rfl:      benzonatate (TESSALON) 100 MG capsule, TAKE 1 CAPSULE BY MOUTH 3 TIMES A DAY AS NEEDED FOR COUGH, Disp: 30 capsule, Rfl: 0     cholecalciferol, vitamin D3, (VITAMIN D3) 5,000 unit Tab, Take 10,000 Units by mouth daily., Disp: , Rfl:      cyanocobalamin, vitamin B-12, 5,000 mcg Subl, Place 5,000 mcg under the tongue daily., Disp: , Rfl:      desvenlafaxine succinate (PRISTIQ) 50 MG 24 hr tablet, 1 tablet daily., Disp: , Rfl:      furosemide (LASIX) 20 MG tablet, Take 40 mg by mouth daily., Disp: , Rfl: 3     KLOR-CON M20 20 mEq tablet, Take 20 mEq by mouth daily., Disp: , Rfl: 3     levothyroxine (SYNTHROID, LEVOTHROID) 175 MCG tablet, Take 1 tablet daily for 6 days of the week, 7th day PRN, Disp: 14 tablet, Rfl: 0     losartan (COZAAR) 25 MG tablet, Take 25 mg by mouth., Disp: , Rfl:      omeprazole (PRILOSEC) 20 MG capsule, TAKE 1 CAPSULE BY MOUTH 2 TIMES A DAY, Disp: 180 capsule, Rfl: 3     pramipexole (MIRAPEX) 1 MG tablet, Take 0.5-1 tablets (0.5-1 mg total) by mouth 2 (two) times a day., Disp: 60 tablet, Rfl: 5     umeclidinium-vilanterol (ANORO ELLIPTA) 62.5-25  mcg/actuation inhaler, Inhale 1 puff daily., Disp: 1 Inhaler, Rfl: 6     budesonide-formoterol (SYMBICORT) 160-4.5 mcg/actuation inhaler, Inhale 2 puffs 2 (two) times a day., Disp: 3 Inhaler, Rfl: 3     buprenorphine (BUTRANS) 10 mcg/hour PTWK patch, Place 1 patch on the skin every 7 days., Disp: 4 patch, Rfl: 2     diclofenac sodium (VOLTAREN) 1 % Gel, Apply 2 g topically 4 (four) times a day. To shoulder, Disp: 300 g, Rfl: 3     hydrOXYzine pamoate (VISTARIL) 25 MG capsule, Take 1 capsule (25 mg total) by mouth 4 (four) times a day as needed for anxiety., Disp: 56 capsule, Rfl: 2     ipratropium (ATROVENT HFA) 17 mcg/actuation inhaler, Inhale 2 puffs 4 (four) times a day., Disp: 1 Inhaler, Rfl: 2     naloxone (NARCAN) 4 mg/actuation nasal spray, 1 spray (4 mg dose) into one nostril for opioid reversal. Call 911. May repeat if no response in 3 minutes., Disp: 1 Box, Rfl: 0     predniSONE (DELTASONE) 10 mg tablet, Take 40mg by mouth daily for 3 days, then 30mg x 3 days, then 20mg x 3 days ,then 10mg x 3 days then stop., Disp: 30 tablet, Rfl: 0     triamterene-hydrochlorothiazide (MAXZIDE-25) 37.5-25 mg per tablet, Take 1 tablet by mouth daily., Disp: 90 tablet, Rfl: 3     VITAMIN K2 ORAL, Take 300 mcg by mouth daily., Disp: , Rfl:     Current Facility-Administered Medications:      albuterol nebulizer solution 2.5 mg (PROVENTIL), 2.5 mg, Inhalation, Once, Loida Stockton MD     cyanocobalamin injection 1,000 mcg, 1,000 mcg, Intramuscular, Q30 Days, Loida Stockton MD, 1,000 mcg at 05/28/19 0912  Clear sensorium good eye contact.  Thought process tight logical.  Appears to have a wider range of affect than when last seen.          Objective:     Vitals:    12/05/19 1246   BP: 124/71   Pulse: (!) 101   PainSc:   7   PainLoc: Shoulder       Plan: She will continue with the Butrans patch 10 mcg weekly, was concerned they may be more nausea but is increased.    Reviewed the medical cannabis may be helpful for  management.    We will try hydroxyzine for the nausea.    Time spent more than 25 minutes face-to-face, more than 50% count about above condition and coordination of treatment plan as above          This note has been dictated using voice recognition software. Any grammatical or context distortions are unintentional and inherent to the software

## 2021-06-04 NOTE — TELEPHONE ENCOUNTER
Winnie called with complaint of increased cough, shortness of breath and not feeling well requesting to start her action plan medications. Orders sent to pharmacy and instructed to call if no improvement or symptoms get worse.

## 2021-06-04 NOTE — PATIENT INSTRUCTIONS - HE
PLAN:    Continue Butrans patch 10 mcg weekly    May use hydroxyzine for nausea 25 mg one to two every six hours as needed    Continue Voltaren gel as needed    You are in process of completing Medical Cannabis enrollment    You will be meeting with orthopedic surgeon    Return in 8 weeks

## 2021-06-04 NOTE — PATIENT INSTRUCTIONS - HE
It was a pleasure to see you today.    Continue the anoro and as needed albuterol.  When you call your new carrier, other equivalents of anoro are: stiolto, bevespi - please let me know and I am happy to order any of these for you, to any pharmacy you'd like.     Continue your care at the  with sleep medicine, rehab, and Dr. Bain.    I will see you back in 1 year.      Reina Morillo MD  Pulmonary and Critical Care Medicine  [facility]  Office: [phone]  Pager: [phone]

## 2021-06-04 NOTE — TELEPHONE ENCOUNTER
Spoke with Patricia and let her know that pt sees a psychiatrist and a counselor.  Patricia aware, and states she spoke with pt about that, but pt stated she believes she needs more help.

## 2021-06-04 NOTE — TELEPHONE ENCOUNTER
RN cannot approve Refill Request    RN can NOT refill this medication med is not covered by policy/route to provider. Last office visit: 10/16/2019 Loida Stockton MD Last Physical: 5/28/2019 Last MTM visit: Visit date not found Last visit same specialty: 10/16/2019 Loida Stockton MD.  Next visit within 3 mo: Visit date not found  Next physical within 3 mo: Visit date not found      Siobhan Hernandez, [facility] Triage/Med Refill 12/20/2019    Requested Prescriptions   Pending Prescriptions Disp Refills     benzonatate (TESSALON) 100 MG capsule [Pharmacy Med Name: BENZONATATE 100 MG CAPSULE] 30 capsule 0     Sig: TAKE 1 CAPSULE BY MOUTH THREE TIMES A DAY AS NEEDED FOR COUGH       There is no refill protocol information for this order

## 2021-06-04 NOTE — TELEPHONE ENCOUNTER
Winnie called with complaint of increased cough, shortness of breath and not feeling well requesting to start her action plan medications. Be reordered. Orders sent to pharmacy and instructed to call if no improvement or symptoms get worse, or does not improve.

## 2021-06-04 NOTE — PROGRESS NOTES
Patient here to see Dr. Dubois for follow-up.  Patient complains of right shoulder pain that is a constant ache, but the intensity is intermittent.  Patient has a pain level of 7/10. Functionality Score: 7

## 2021-06-05 ENCOUNTER — COMMUNICATION - HEALTHEAST (OUTPATIENT)
Dept: INTERNAL MEDICINE | Facility: CLINIC | Age: 66
End: 2021-06-05

## 2021-06-05 ENCOUNTER — RECORDS - HEALTHEAST (OUTPATIENT)
Dept: PALLIATIVE MEDICINE | Facility: OTHER | Age: 66
End: 2021-06-05

## 2021-06-05 ENCOUNTER — RECORDS - HEALTHEAST (OUTPATIENT)
Dept: PHYSICAL MEDICINE AND REHAB | Facility: CLINIC | Age: 66
End: 2021-06-05

## 2021-06-05 ENCOUNTER — RECORDS - HEALTHEAST (OUTPATIENT)
Dept: INTERNAL MEDICINE | Facility: CLINIC | Age: 66
End: 2021-06-05

## 2021-06-05 VITALS — BODY MASS INDEX: 35.36 KG/M2 | WEIGHT: 220 LBS | HEIGHT: 66 IN

## 2021-06-05 VITALS
HEART RATE: 96 BPM | OXYGEN SATURATION: 96 % | BODY MASS INDEX: 36.03 KG/M2 | HEIGHT: 66 IN | RESPIRATION RATE: 22 BRPM | SYSTOLIC BLOOD PRESSURE: 120 MMHG | DIASTOLIC BLOOD PRESSURE: 80 MMHG | WEIGHT: 224.19 LBS

## 2021-06-05 VITALS — HEIGHT: 66 IN | BODY MASS INDEX: 35.83 KG/M2

## 2021-06-05 VITALS
SYSTOLIC BLOOD PRESSURE: 118 MMHG | RESPIRATION RATE: 18 BRPM | WEIGHT: 254.5 LBS | BODY MASS INDEX: 40.9 KG/M2 | HEIGHT: 66 IN | OXYGEN SATURATION: 95 % | DIASTOLIC BLOOD PRESSURE: 80 MMHG | HEART RATE: 84 BPM

## 2021-06-05 VITALS — HEIGHT: 66 IN | BODY MASS INDEX: 41.08 KG/M2

## 2021-06-05 VITALS
WEIGHT: 232.6 LBS | OXYGEN SATURATION: 96 % | SYSTOLIC BLOOD PRESSURE: 124 MMHG | DIASTOLIC BLOOD PRESSURE: 76 MMHG | HEART RATE: 112 BPM | BODY MASS INDEX: 37.38 KG/M2 | HEIGHT: 66 IN

## 2021-06-05 VITALS
BODY MASS INDEX: 36.28 KG/M2 | HEART RATE: 88 BPM | WEIGHT: 224.8 LBS | DIASTOLIC BLOOD PRESSURE: 72 MMHG | SYSTOLIC BLOOD PRESSURE: 124 MMHG | TEMPERATURE: 98 F | OXYGEN SATURATION: 97 %

## 2021-06-05 DIAGNOSIS — M53.3 DISORDER OF SACRUM: ICD-10-CM

## 2021-06-05 DIAGNOSIS — N63.0 LUMP OR MASS IN BREAST: ICD-10-CM

## 2021-06-05 DIAGNOSIS — Z00.00 ROUTINE GENERAL MEDICAL EXAMINATION AT A HEALTH CARE FACILITY: ICD-10-CM

## 2021-06-05 LAB
SARS-COV-2 PCR COMMENT: ABNORMAL
SARS-COV-2 RNA SPEC QL NAA+PROBE: POSITIVE
SARS-COV-2 VIRUS SPECIMEN SOURCE: ABNORMAL

## 2021-06-05 NOTE — TELEPHONE ENCOUNTER
RN cannot approve Refill Request    RN can NOT refill this medication PCP to review. Last office visit: 10/16/2019 Loida Stockton MD Last Physical: 5/28/2019 Last MTM visit: Visit date not found Last visit same specialty: 10/16/2019 Loida Stockton MD.  Next visit within 3 mo: Visit date not found  Next physical within 3 mo: Visit date not found      Jennifer Calero, [facility] Triage/Med Refill 2/9/2020    Requested Prescriptions   Pending Prescriptions Disp Refills     levothyroxine (SYNTHROID, LEVOTHROID) 175 MCG tablet 14 tablet 0     Sig: Take 1 tablet daily for 6 days of the week, 7th day PRN       Thyroid Hormones Protocol Passed - 2/9/2020  2:49 PM        Passed - Provider visit in past 12 months or next 3 months     Last office visit with prescriber/PCP: 10/16/2019 Loida Stockton MD OR same dept: 10/16/2019 Loida Stockton MD OR same specialty: 10/16/2019 Loida Stockton MD  Last physical: 5/28/2019 Last MTM visit: Visit date not found   Next visit within 3 mo: Visit date not found  Next physical within 3 mo: Visit date not found  Prescriber OR PCP: Loida Stockton MD  Last diagnosis associated with med order: 1. Acquired hypothyroidism  - levothyroxine (SYNTHROID, LEVOTHROID) 175 MCG tablet; Take 1 tablet daily for 6 days of the week, 7th day PRN  Dispense: 14 tablet; Refill: 0    If protocol passes may refill for 12 months if within 3 months of last provider visit (or a total of 15 months).             Passed - TSH on file in past 12 months for patient age 12 & older     TSH   Date Value Ref Range Status   10/14/2019 2.74 0.30 - 5.00 uIU/mL Final

## 2021-06-05 NOTE — TELEPHONE ENCOUNTER
Call from patient. State she is still coughing and does not feel the anoro inhaler is working for her.  Wants to schedule to come back and see Dr. Morillo.      Patient scheduled for Thursday, Feb 20 with Dr. Morillo.  Refills also sent for her albuterol nebs. Patient state she is out of this and has not been using for her cough.  She will refill and try using the nebs as well.  Has been using Mucinex which does help with some of the congestion..

## 2021-06-05 NOTE — TELEPHONE ENCOUNTER
Fax received from [facility], with an approval for Butrans 10mcg, weekly patch from 1/1/2020-1/2/2021. Sent for scanning

## 2021-06-06 NOTE — PROGRESS NOTES
"Pulmonary Clinic Outpatient Consultation    Assessment and Plan:   This is a 64 y F with depression/anxiety, alcohol abuse, severe sleep apnea, and possible mild OHS not on CPAP, morbid obesity (BMI 42), 44+ pack year tobacco history in remission, COPD with mild osbtruction, GERD and hiatal hernia, chronic pain, hypothyroidism after tx for Graves, pheochromocytoma resected ~2 years ago, who presents for an urgent visit for congested cough, hoarseness, and poor breathing.     No fevers, weight is coming down, examines euvolemic, with wheezing on exam. Stopped her anoro as she wasn't sure it was helping, now breathing feels worse.     PLAN:    Rx course of steroids, doxy for AECOPD    2nd exacerbation in last 3 months - step up inhaler to Trelegy - given coupon for 1st month free to try. If she finds this helpful will continue on triple therapy.    Encouraged ongoing follow-up w cardiology, she appears euvolemic today, and following low salt diet.      She will call me in 2-3 weeks to check in. Will either start back on anoro or continue on Trelegy pending improvement with this - she has poor insight into her symptoms, would have low threshold to continue trelegy.    Reina Morillo MD  Pulmonary and Critical Care Medicine  [facility]  Office: [phone]  Pager: [phone]    ----------------------    Reason for Consult: Cough, congestion, inhaler not working    Interval HPI:   Difficult to get a clear history - exacerbation in Dec for which she took pred/doxy and improved. Currently with congested cough, hoarseness, \"terrible breathing,\" but states this is her baseline. I have certainly seen her better than this at our visits. Wheezing on exam. She is uncertain that anoro was helping, but stopped it Monday and now feels breathing is worse off of it. Using albuterol 2 times per day.    Continues to see [facility] cards for diastolic CHF, titrating lasix, she is trying to adhere to low salt diet. Wt 277 - " 272 lbs today.    Seeing sleep and trying to use CPAP    Overwhelmed and tearful    --------------  This is a 64 y F with depression/anxiety, alcohol abuse, severe sleep apnea, not on CPAP, morbid obesity (BMI 42), 44+ pack year tobacco history in remission, COPD, GERD and hiatal hernia, chronic pain on opiods, hypothyroid after tx for Graves, pheochromocytoma resected ~2 years ago, who presents for an evaluation of COPD and exertional dyspnea.     She was very emotionally labile, tangential, and difficult to obtain a history from during our visit.     She reports she has had long standing ANNA. It has gotten worse over time, which she correlates with a 60+ lbs weight gain over the last 2 years. She has a chronic cough productive of clear phlegm. She notes significant depressive symptoms but no SI, feels no energy or motivation. She reports a recent relapse of alcohol abuse, but tells me she has been sober for 1 month however reported at [facility] visit 1 week ago she was drinking 2 bottles of wine daily.    She has severe KYAW per PS[facility] but has not been using CPAP. She was referred to sleep again and has an appointment next month.    For her COPD she hs been on Symbicort intermittently due to cost, she just picked up another supply and has been back on for ~1 month. She is uncertain if it helps. She ran out of albuterol last month. CAT 31 today. Not certain of her last exacerbation. She quit smoking ~20 years ago.    She recently saw Dr. Edwards at [facility] for a pulmonary HTN evaluation. She had an enlarged PA on a CT PE. The echo done there was unable to estimate PA pressure or diastolic function. It showed a mild reduction in RV function and was otherwise normal. She was referred for a sleep study and to hepatology for a history of possible hereditary hemochromatosis.     ROS:  A 12-system review was obtained and was negative with the exception of the symptoms endorsed in the history of present illness.    PMH:  Past  Medical History:   Diagnosis Date     Anxiety      Breast cancer (H) 1994     Chronic pain syndrome      COPD (chronic obstructive pulmonary disease) (H)      Depression      Esophageal reflux      Graves disease      Hemochromatosis      Hx antineoplastic chemotherapy      Hx of radiation therapy      Hypertension      Impaired fasting glucose      Pheochromocytoma      Psoriasis      Psoriatic arthropathy (H)      Restless leg syndrome      Right rotator cuff tear      Sleep apnea 2017    CPAP     Spinal stenosis      Urinary incontinence      Vitamin B12 deficiency      Social Hx:  Tobacco: 2-3 PPD for 22 years, quit >20 years ago  Prior drug abuse, EtOH dependency, had been sober 20 years now relapsed, nothing in the last 1 month  Disabled, then retired, worked in [employer]  Currently lives in [place] with   1 dog, 1 cat   No travel in 2 years    Physical Exam:  /62   Pulse (!) 106   Resp 24   Wt (!) 272 lb 3.2 oz (123.5 kg)   SpO2 96% Comment: RA  BMI 43.93 kg/m    Gen: Alert, oriented, hacking cough, congested sounding  HEENT: nasal turbinates are unremarkable, no oropharyngeal lesions, no cervical or supraclavicular lymphadenopathy  CV: RRR, no M/G/R  Resp: Decreased, diffusely rhoncherous with expiratory wheezes  Abd: obese, soft, nontender, no palpable organomegaly  Skin: no apparent rashes  Ext: no cyanosis, clubbing, trace bl ankle edema  Neuro: alert, nonfocal    Labs:  Reviewed  ABG 7.40/43    Imaging studies:  Personally reviewed:    10/16/19 CXR:  No significant change. Lungs are mildly hyperinflated consistent with COPD but clear, no new pneumonia. Heart size remains normal. Prominent central pulmonary arteries again evident suggestive of pulmonary artery hypertension.    6/24/19 CT PE:  ANGIOGRAM CHEST: Atherosclerotic plaque including coronary artery calcification. No aortic aneurysm or dissection. Mildly prominent right and left main pulmonary arteries suggestive of pulmonary  artery hypertension. No pulmonary emboli.     RV/LV RATIO: N/A     LUNGS AND PLEURA: Stable benign 4 mm pulmonary nodule pleural-based on image #77 at the left lung base. Mild to moderate scattered fibroatelectasis. Diffuse air trapping often associated with small vessel or small airways disease. No effusion.     MEDIASTINUM: Moderate sized hiatal hernia. No mass or adenopathy.     LIMITED UPPER ABDOMEN: Hepatic steatosis. Left adrenal mass on prior not imaged.     MUSCULOSKELETAL: Degenerative disease. Mastectomy with reconstruction as on prior.     CONCLUSION:  1.  Evidence for pulmonary artery hypertension. No pulmonary emboli, aortic aneurysm or dissection. Atherosclerotic plaque including coronary artery disease.  2.  Moderate-sized hiatal hernia.  3.  Hepatic steatosis.  4.  Remainder stable.    8/21/19 PFT's  FEV1/FVC 65 FEV1 72 FVC 87  Negative BD response  TLC 93  RV/  DLCO ralph 81%  FVL has scooping of expiratory limb consistent with obstruction    Mild obstruction  Normal lung volumes  Normal diffusion capacity    Right Heart Cath (from the [facility])  PA  Systolic:41 mmHg  Diasotlic: 24 mmHg  Mean: 31 mmHg  HR: 88 bpm  PA Sat: 70.8%    PCW  A-wave: 18 mmHg  V-wave: 17 mmHg  Mean: 16 mmHg  HR: 91 bpm    Cardiac Output  CO Jeannie: 5.72 L/min  CI Jeannie: 2.56 L/min/m2  CO TD: Not performed  CI TD: Not performed     8/13/19 Echo (care everywhere)  Unable to assess diastolic function  Interpretation Summary  1. Global and regional left ventricular function is normal with an EF of 55-  60%.  2. The right ventricle is normal size. Global right ventricular function is mildly reduced.  3. No significant valvular disease.  4. Unable to assess pulmonary artery pressure.  5. IVC diameter <2.1 cm collapsing >50% with sniff suggests a normal RA  pressure of 3 mmHg.    There is no prior study for direct comparison.

## 2021-06-06 NOTE — TELEPHONE ENCOUNTER
Refill Approved    Rx renewed per Medication Renewal Policy. Medication was last renewed on 7/25/19.    Pamela Martinez, [facility] Triage/Med Refill 3/15/2020     Requested Prescriptions   Pending Prescriptions Disp Refills     omeprazole (PRILOSEC) 20 MG capsule 180 capsule 3     Sig: TAKE 1 CAPSULE BY MOUTH 2 TIMES A DAY       GI Medications Refill Protocol Passed - 3/13/2020  7:50 AM        Passed - PCP or prescribing provider visit in last 12 or next 3 months.     Last office visit with prescriber/PCP: 10/16/2019 Loida Stockton MD OR same dept: 10/16/2019 Loida Stockton MD OR same specialty: 10/16/2019 Loida Stockton MD  Last physical: 5/28/2019 Last MTM visit: Visit date not found   Next visit within 3 mo: Visit date not found  Next physical within 3 mo: Visit date not found  Prescriber OR PCP: Loida Stockton MD  Last diagnosis associated with med order: 1. Gastroesophageal reflux disease with esophagitis  - omeprazole (PRILOSEC) 20 MG capsule; TAKE 1 CAPSULE BY MOUTH 2 TIMES A DAY  Dispense: 180 capsule; Refill: 3    If protocol passes may refill for 12 months if within 3 months of last provider visit (or a total of 15 months).

## 2021-06-06 NOTE — PATIENT INSTRUCTIONS - HE
We will do a trial of a new inhaler called trelegy.    I have called in your action plan medications with a course of prednisone and doxycycline.    Please call in about 3 weeks. I want to hear how you are feeling with the medications and new inhaler. We will make decisions from there.       Reina Morillo MD  Pulmonary and Critical Care Medicine  [facility]  Office: [phone]  Pager: [phone]

## 2021-06-06 NOTE — PROGRESS NOTES
Patient is here for a follow up appointment with Dr. Dubois. Patient has bilateral shoulder pain, describes the pain as constant, dull, sharp and rates the pain as /10. Patient wants to talk about medical cannabis enrollment and increase pain medication wants to use [facility] due to cost. Patient needs refills for Butrans. Completed NDI. Functionality is 6.

## 2021-06-06 NOTE — TELEPHONE ENCOUNTER
FYI - Status Update  Who is Calling: [facility]  Update: We are a new pharmacy for patient.  Please resend this medication.   Okay to leave a detailed message?:  No return call needed

## 2021-06-06 NOTE — PATIENT INSTRUCTIONS - HE
PLAN:    You will inform us which pharmacy you want the Butrans to go    We will renew your Medical Cannabis program    Discussed supplement for CollaGen for shoulder pain    Return in 8 weeks

## 2021-06-06 NOTE — PROGRESS NOTES
Assessment/Plan:     Problem List Items Addressed This Visit     None      Visit Diagnoses     Chronic pain syndrome    -  Primary            No follow-ups on file.    Patient Instructions   PLAN:    You will inform us which pharmacy you want the Butrans to go    We will renew your Medical Cannabis program    Discussed supplement for CollaGen for shoulder pain    Return in 8 weeks        Subjective:       64 y.o. female presents for management of shoulder pain.  Pain varies from 3-8 presently 7.    Reviews struggling more recently with COPD, trying to get the proper inhalers.    She is concerned that the price of the Butrans patch is increased by 4 times.  She also has been struggling to get enrolled in the medical cannabis program with some confusion about so security.    Also recently diagnosed with pulmonary hypertension, doing cardiac rehab at the [facility].  She demonstrates that her shoulder limitations preclude some challenges such as using upper arms and pedaling.    She has gained some weight, concerned that will exacerbate back and knee problems.    Using Voltaren gel 3 times a day.    Been trying to get to the pool therapy in [place] at times.    She continues on the buprenorphine patch 10 mcg.      Current Outpatient Medications:      albuterol (PROVENTIL) 2.5 mg /3 mL (0.083 %) nebulizer solution, Take 3 mL (2.5 mg total) by nebulization every 6 (six) hours as needed for wheezing., Disp: 75 mL, Rfl: 12     aspirin 325 MG EC tablet, Take 325 mg by mouth daily as needed for pain., Disp: , Rfl:      benzonatate (TESSALON) 100 MG capsule, TAKE 1 CAPSULE BY MOUTH THREE TIMES A DAY AS NEEDED FOR COUGH, Disp: 30 capsule, Rfl: 0     cholecalciferol, vitamin D3, (VITAMIN D3) 5,000 unit Tab, Take 10,000 Units by mouth daily., Disp: , Rfl:      cyanocobalamin, vitamin B-12, 5,000 mcg Subl, Place 5,000 mcg under the tongue daily., Disp: , Rfl:      desvenlafaxine succinate (PRISTIQ) 100 MG 24 hr tablet, Take  "100 mg by mouth daily., Disp: , Rfl:      diclofenac sodium (VOLTAREN) 1 % Gel, Apply 2 g topically 4 (four) times a day. To shoulder, Disp: 300 g, Rfl: 3     furosemide (LASIX) 20 MG tablet, Take 40 mg by mouth daily., Disp: , Rfl: 3     KLOR-CON M20 20 mEq tablet, Take 20 mEq by mouth daily. On 7th day taking only half tab, Disp: , Rfl: 3     levothyroxine (SYNTHROID, LEVOTHROID) 175 MCG tablet, Take 1 tablet daily for 6 days of the week, 7th day PRN, Disp: 14 tablet, Rfl: 0     omeprazole (PRILOSEC) 20 MG capsule, TAKE 1 CAPSULE BY MOUTH 2 TIMES A DAY, Disp: 180 capsule, Rfl: 3     pramipexole (MIRAPEX) 1 MG tablet, Take 0.5-1 tablets (0.5-1 mg total) by mouth 2 (two) times a day., Disp: 60 tablet, Rfl: 5     umeclidinium-vilanterol (ANORO ELLIPTA) 62.5-25 mcg/actuation inhaler, Inhale 1 puff daily., Disp: 1 Inhaler, Rfl: 6    Current Facility-Administered Medications:      albuterol nebulizer solution 2.5 mg (PROVENTIL), 2.5 mg, Inhalation, Once, Loida Stockton MD     cyanocobalamin injection 1,000 mcg, 1,000 mcg, Intramuscular, Q30 Days, Loida Stockton MD, 1,000 mcg at 05/28/19 0912  She is alert with a clear sensorium good eye contact.  Thought process tight logical.          Objective:     Vitals:    02/11/20 0845   BP: 136/76   Pulse: 96   Weight: (!) 276 lb 8 oz (125.4 kg)   Height: 5' 6\" (1.676 m)   PainSc:   7   PainLoc: Shoulder         Time spent more than 15 minutes face-to-face, 50% counts about above condition coordination treatment plan          This note has been dictated using voice recognition software. Any grammatical or context distortions are unintentional and inherent to the software  "

## 2021-06-06 NOTE — TELEPHONE ENCOUNTER
Winnie called to request her action plan medications be ordered. Said she started coughing and having more shortness of breath yesterday. Denies fever. Prednisone and Doxycycline ordered per action plan and instructed to call if no improvement.

## 2021-06-07 ENCOUNTER — RECORDS - HEALTHEAST (OUTPATIENT)
Dept: ADMINISTRATIVE | Facility: OTHER | Age: 66
End: 2021-06-07

## 2021-06-07 ENCOUNTER — COMMUNICATION - HEALTHEAST (OUTPATIENT)
Dept: INTERNAL MEDICINE | Facility: CLINIC | Age: 66
End: 2021-06-07

## 2021-06-07 NOTE — PATIENT INSTRUCTIONS - HE
"Plan:    Juanito a trial of the \"CollaGen\" supplementation from [facility].  We will see if this will help with your rotator cuff while you await surgery.    Continue with the buprenorphine 10 mcg patch weekly.    Continue with the medical cannabis program finding the most optimal preparation.  Graph follow-up with Dr. Dubois in 6 to 8 weeks.  "

## 2021-06-07 NOTE — TELEPHONE ENCOUNTER
Who is calling:  Winnie  Reason for Call:  Patient called wanting to confirm that we have received lab orders from the [facility]. She would like to speak with Dr Stockton or someone in the lab. She does not know what labs she needs but stated that the [facility] sent them. No orders are in her chart from the [facility].  Date of last appointment with primary care: 10/16/19  Okay to leave a detailed message: Yes

## 2021-06-07 NOTE — PROGRESS NOTES
Discharge Summary    Session Type: Patient is presenting for an Individual session.    Dates of service 12/5/2018  to 11/14/2019  # Sessions completed: 14      Diagnosis at Intake  Major Depression, recurrent, severe  Generalized Anxiety Disorder  Chronic PTSD  R/O Bipolar Disorder    Diagnosis at Discharge  Unable to assess, last seen 11/14/2019      Progress toward goal 1: Decrease average depression level from 23 to 10 or below.  Partially Met    Progress toward goal 2: Decrease average anxiety level from 12 to 6 or below.  Unmet    Additional goals: Decrease average pain level from 8/10  to 5/10.  Partially Met        Reason for discharge:Pt dropped out     Prognosis at discharge:Poor    Recommendations and referrals at discharge: Follow up with psychotherapy when able to attend regularly.  Diagnostic assessment will be required.  Follow up with pain center provider as scheduled. Follow pain center plan of care.         Shanice Merida      3/26/2020  10:26 AM
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
## 2021-06-07 NOTE — TELEPHONE ENCOUNTER
"Patient calls, voicemail regarding issues with the butrans patch adhering to the skin.  \"The 3rd patch did not go on right so I tried adjusting it over several days and it was not fully sticking on my skin and I dont feel I got all of the medication\"  \"I took that patch off after 2 days of this and replaced it with the 4th patch which is working out just fine\"  \"I will need a refill earlier than expected\"    My chart message sent to patient to gather more information    Phone call placed to patient:  Reviewed when last patch was placed and she reports yesterday, 5/5, reviewed that there will not be an issue with needing an leydi fill as the next refill has already been sent to pharmacy with a start date of 5/12, patient should be able to pick this up on 5/11.  Advised patient that is there are any issues on the end of the pharmacy to have them contact the clinic.  She reports no further issues with the patch adhering to the skin and reports that the issue was most likely user error when she was \"playing around with the corner that bubbled up\"  "

## 2021-06-08 NOTE — PROGRESS NOTES
[facility] Follow Up Note    Assessment/Plan:  1. Bronchitis, subacute, with bronchospasm  Onset of respiratory illness early January.  Previously treated with azithromycin and prednisone.  Symptoms recurred.  Now with recalcitrant cough and productive sputum.  Significant bronchospasm.  Recommendations: We'll proceed with an extended tapered prednisone dose.  Doxycycline 100 mg twice a day for 10 days.  Continue with nebulizer with albuterol.  We'll look to renew inhaler for chronic use.  Follow-up in 3 weeks to ensure improvement  - Comprehensive Metabolic Panel  - HM2(CBC w/o Differential)    2. Thyromegaly  Prominence of the neck with possible thyromegaly.  We'll proceed with an ultrasound  - US Thyroid; Future  - Thyroid Stimulating Hormone (TSH)    3. Medication monitoring encounter  She is on multiple high risk meds.  Will check a CMP and hemogram    4. Hypothyroidism  Due for TSH    5.  Left shoulder pain  Review Dr. Moise's consult.  It is recommended that she proceed with surgery.  We'll revisit at next appointment    Loida Stockton MD    Chief Complaint:  Chief Complaint   Patient presents with     Cough     Shortness of Breath       History of Present Illness:  Randi is a 61 y.o. female who is here today for follow-up with regard to a cough.  Of note, she contacted us in early January for a persistent cough with bronchospasm.  She was treated with prednisone and azithromycin.  She had temporary improvement but her symptoms quickly recurred.  She states she is having paroxysms of cough with some shortness of breath due to difficulty with exhalation.  She describes some associated lightheadedness.  This occurs only when coughing.  She has some nausea that occurs with excessive exacerbations of cough.  Of note, her history is significant for reactive airways and chronic bronchitis.  She denies any new exposures.  Her  had a similar ailment but recovered from it much more quickly.   She denies fevers, chills or myalgias.  She is not having any gastrointestinal symptoms with the exception of the nausea that occurs only during the coughing exacerbation.  She has some headaches associated with coughing.  As a result of this illness, she has gained weight.  She has been sedentary and eating the wrong foods.    She does mention today that she did see Dr. Moise who recommended surgery.  She liked but this shoulder surgery off until her vacation in [place]    Review of Systems:  A comprehensive review of systems was performed and was otherwise negative    PFSH:  Social History: She is .  She has a mental health disability  History   Smoking Status     Former Smoker   Smokeless Tobacco     Not on file       Past History: Significant for multiple medical problems including hemachromatosis, remote history of breast cancer, remote history of substance abuse, hypertension, depression and anxiety  Current Outpatient Prescriptions   Medication Sig Dispense Refill     albuterol (PROVENTIL HFA;VENTOLIN HFA) 90 mcg/actuation inhaler Inhale 2 puffs every 6 (six) hours as needed.       albuterol (PROVENTIL) 2.5 mg /3 mL (0.083 %) nebulizer solution Take 3 mL (2.5 mg total) by nebulization every 6 (six) hours as needed for wheezing. 75 mL 0     codeine-guaiFENesin (GUAIFENESIN AC)  mg/5 mL liquid Take 5 mL by mouth 2 (two) times a day as needed for cough. 240 mL 1     cyanocobalamin, vitamin B-12, 2,500 mcg Subl Place under the tongue 2 (two) times a week.       diazepam (VALIUM) 10 MG tablet as needed.  1     ergocalciferol (VITAMIN D2) 50,000 unit capsule Take 1 capsule (50,000 Units total) by mouth 2 (two) times a week. 8 capsule 12     folic acid (FOLVITE) 1 MG tablet TAKE 1 TABLET (1 MG) BY ORAL ROUTE ONCE DAILY 90 tablet 3     hydrochlorothiazide (MICROZIDE) 12.5 mg capsule TAKE 1 CAPSULE BY MOUTH DAILY. 90 capsule 3     lamoTRIgine (LAMICTAL) 100 MG tablet Take 100 mg by mouth daily.  1      levothyroxine (SYNTHROID) 150 MCG tablet Alternate one tablet with her other dose of 175 g.  Or take this tablet every other day. 10 tablet 11     levothyroxine (SYNTHROID, LEVOTHROID) 175 MCG tablet TAKE 1 TABLET BY MOUTH DAILY 20 tablet 11     omeprazole (PRILOSEC) 20 MG capsule TAKE ONE CAPSULE BY MOUTH TWO TIMES A  capsule 2     pramipexole (MIRAPEX) 1 MG tablet Take 0.5 - 1 tab daily PRN 90 tablet 3     PRISTIQ 50 mg 24 hr tablet Take 50 mg by mouth daily.        temazepam (RESTORIL) 15 mg capsule   1     triamcinolone (KENALOG) 0.1 % ointment Thin layer bid 80 g 0     VOLTAREN 1 % Gel Apply a thin layer twice daily 100 g 2     doxycycline (VIBRA-TABS) 100 MG tablet Take 1 tablet (100 mg total) by mouth 2 (two) times a day for 10 days. 20 tablet 0     HYDROcodone-acetaminophen (NORCO )  mg per tablet Take 1 tablet by mouth every 6 (six) hours as needed for pain. 112 tablet 0     methocarbamol (ROBAXIN) 500 MG tablet Take 1 tablet (500 mg total) by mouth 4 (four) times a day. 120 tablet 1     predniSONE (DELTASONE) 20 MG tablet Take 3 tabs days 1-3, take 2 tabs days 4-6, take one tab day 7-9, the half tab for days 10-12 10 tablet 0     No current facility-administered medications for this visit.        Family History: Nothing new    Physical Exam:  General Appearance:   She appears mildly ill.  She is coughing during the interview.  She has low oxygen levels with ambulation  Vitals:    02/08/17 1029 02/08/17 1037   BP: 122/84    Patient Site: Left Arm    Patient Position: Sitting    Cuff Size: Adult Large    Pulse: 95    Temp: 98.6  F (37  C)    TempSrc: Tympanic    SpO2: (!) 89% 94%   Weight: (!) 230 lb 11.2 oz (104.6 kg)      Wt Readings from Last 3 Encounters:   02/08/17 (!) 230 lb 11.2 oz (104.6 kg)   12/02/16 (!) 234 lb (106.1 kg)   11/14/16 220 lb (99.8 kg)     Body mass index is 37.24 kg/(m^2).    Head and neck exam is performed.  Pupils are equal react to light.  Ears nose and throat  are clear  Neck is supple.  There is mild prominence of the thyroid right gland right greater than left  Lungs reveal scattered rhonchi and diffuse wheezing throughout  Cardiac exam reveals mild tachycardia  Abdomen is nontender  Extremities are without edema  Skin is without rashes    Data Review:    Analysis and Summary of Old Records (2): Review Dr. Moise's consult with regard to her shoulder      Records Requested (1): 0      Other History Summarized (from other people in the room) (2): 0    Radiology Tests Summarized (XRAY/CT/MRI/DXA) (1): 0    Labs Reviewed (1): Ordered labs0    Medicine Tests Reviewed (EKG/ECHO/COLONOSCOPY/EGD) (1): 0    Independent Review of EKG or X-RAY (2): 0    3

## 2021-06-08 NOTE — TELEPHONE ENCOUNTER
Pharmacy calls, asking for chronic pain to be added to the script.  Script addended.  Requested Prescriptions     Pending Prescriptions Disp Refills     buprenorphine (BUTRANS) 10 mcg/hour PTWK patch 4 patch 0     Sig: Place 1 patch on the skin every 7 days. Place 1 patch on the skin every 7 days for chronic pain and long term use     Signed Prescriptions Disp Refills     buprenorphine (BUTRANS) 10 mcg/hour PTWK patch 4 patch 0     Sig: Place 1 patch on the skin every 7 days.     Authorizing Provider: AXEL WIGGINS     Cub
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
## 2021-06-09 NOTE — TELEPHONE ENCOUNTER
Orders being requested: The patient is requesting a Vitamin D and Vitamin B12 lab order. The patient states she had blood drawn today at [facility]. The patient states the  [place] extra tubes for the labs if Loida Stockton MD can order these tests.  Reason service is needed/diagnosis: The patient states she is Vitamin D and B12 deficient  When are orders needed by: Today  Where to send Orders: [facility]   Okay to leave detailed message?  Yes

## 2021-06-09 NOTE — TELEPHONE ENCOUNTER
2020 Prior Authorization Request  Who's requesting PA: Pharmacy  Provider: Dr. Dusty Dubois  Pharmacy Name, Location & Phone # : [facility] [address]   Medication Name: Belbuca 150 MCG Films  Quantity:  Refills:  Days Supply:  Medication Instructions:   Insurance Plan: Medicare and BCBS  Insurance Member ID & GRP # : Medicare ID# [id number], BCBS ID # [id number] Group # [id number]  CoverMyMeds Key: [id number]  Informed patient that prior authorizations can take up to 10 business days for response: YES  Okay to leave a detailed message: YES    Route to: HE PA MED ([id number])

## 2021-06-09 NOTE — PATIENT INSTRUCTIONS - HE
PLAN:    Discussed changing the Butrans patch to Belbuca 1 and 50 mcg twice a day to see if it is helpful for pain and less expensive.    Discussed your working with orthopedist for your shoulder surgery.    You may contact the pharmacist at [facility] Danilo mckenzie, [phone] for supplements to help with your gut and autoimmunity system.    Continue to find different combinations of medical cannabis.    Follow-up with Dr. Dubois in 8 weeks

## 2021-06-09 NOTE — TELEPHONE ENCOUNTER
Orders being requested: Hemaglobin A1c blood test  Reason service is needed/diagnosis: blood work is over due  When are orders needed by: has a lab appointment @ [facility]  Where to send Orders: enter in epic, call patient with any questions  Okay to leave detailed message?  Yes

## 2021-06-09 NOTE — PROGRESS NOTES
[facility] Follow Up Note    Assessment/Plan:  1. Night sweats  Complaints of night sweats.  Still present after being off all psychiatric medications.  Recent thyroids were not poorly controlled.  Therefore, must rule out occult malignancy with her history of breast cancer, hemochromatosis and chronic lung disease.  Labs as below    - Thyroid Stimulating Hormone (TSH)  - T4, Free  - T3 (Triiodthyronine), Free  - HM2(CBC w/o Differential)  - Erythrocyte Sedimentation Rate  - CT Chest Abdomen Pelvis Without Oral Without IV Contrast; Future  - XR Chest PA and Lateral    2. Cough/Bronchitis with diffuse wheezing throughout-there is no hypoxia  Continue doxycycline for an additional 7 days.  Prednisone 50 mg daily #5.  Continue with Symbicort 1 puff twice daily.  Nebulizers as needed.  We will check chest x-ray.  Follow-up in 10 days  - XR Chest PA and Lateral    3. Breast Cancer  History.  Status post reconstruction recently.  She has declined mammograms because of the pain after surgery.  Recommendation: Update mammogram    4.  Mental health issues  She is exceedingly agitated today.  She is anxious and tearful.  She has been off psych meds for 1 month.  Recommendation: Urgent phone call to her psychiatrist and psychotherapist to reinstitute therapy.      Loida Stockton MD    Chief Complaint:  Chief Complaint   Patient presents with     Shortness of Breath     Excessive Sweating       History of Present Illness:  Randi is a 61 y.o. female who is here today for evaluation of a multitude of complaints.  Of note, this interview is rather disorganized as she has been off of her psychiatric meds for 1 month.  She is experiencing bouts of rage and irritability.  She is very acutely anxious.  She feels as though she is crawling out of her skin.  Her complaints today are that with regard to hot flashes and sweats.  She states she can barely tolerate it.  She has intermittent flushing throughout the day.   Along with this, she is experiencing symptoms of bronchitis with cough, congestion, shortness of breath and wheezing.  She is at the end of her seven-day course of doxycycline.  Of note, she declines to take short term prednisone as she believes it has made her gain weight.  Of note, she has had obesity and weight gain over the years despite prednisone use.  Much of this is related to stress eating when her anxiety and agitation are poorly controlled.    With regard to mental health, she is following with her psychiatrist Dr. claros.  He thought he might keep her off meds for an additional 1 month.    Review of Systems:  A comprehensive review of systems was performed and was otherwise negative    PFSH:  Social History: He is  but having marked marital difficulty due to her fits of rage  History   Smoking Status     Former Smoker   Smokeless Tobacco     Not on file       Past History: Significant for depression, anxiety, hemochromatosis, breast cancer  Current Outpatient Prescriptions   Medication Sig Dispense Refill     albuterol (PROVENTIL HFA;VENTOLIN HFA) 90 mcg/actuation inhaler Inhale 2 puffs every 6 (six) hours as needed.       albuterol (PROVENTIL) 2.5 mg /3 mL (0.083 %) nebulizer solution Take 3 mL (2.5 mg total) by nebulization every 6 (six) hours as needed for wheezing. 75 mL 0     codeine-guaiFENesin (GUAIFENESIN AC)  mg/5 mL liquid Take 5 mL by mouth 2 (two) times a day as needed for cough. 240 mL 0     cyanocobalamin, vitamin B-12, 2,500 mcg Subl Place under the tongue 2 (two) times a week.       doxycycline (VIBRA-TABS) 100 MG tablet Take 1 tablet (100 mg total) by mouth 2 (two) times a day for 7 days. 14 tablet 0     ergocalciferol (VITAMIN D2) 50,000 unit capsule Take 1 capsule (50,000 Units total) by mouth 2 (two) times a week. 8 capsule 12     folic acid (FOLVITE) 1 MG tablet TAKE 1 TABLET (1 MG) BY ORAL ROUTE ONCE DAILY 90 tablet 3     hydrochlorothiazide (MICROZIDE) 12.5 mg capsule  TAKE 1 CAPSULE BY MOUTH DAILY. 90 capsule 3     HYDROcodone-acetaminophen (NORCO )  mg per tablet Take 1 tablet by mouth every 6 (six) hours as needed for pain. 112 tablet 0     levothyroxine (SYNTHROID) 150 MCG tablet Alternate one tablet with her other dose of 175 g.  Or take this tablet every other day. (Patient taking differently: Alternate one tablet with her other dose of 175 g.  Take twice weekly) 10 tablet 11     levothyroxine (SYNTHROID, LEVOTHROID) 175 MCG tablet TAKE 1 TABLET BY MOUTH DAILY (Patient taking differently: TAKE 1 TABLET 5 times weekly) 20 tablet 11     LORazepam (ATIVAN) 1 MG tablet as needed.  0     omeprazole (PRILOSEC) 20 MG capsule TAKE ONE CAPSULE BY MOUTH TWO TIMES A  capsule 2     pramipexole (MIRAPEX) 1 MG tablet Take 0.5 - 1 tab daily PRN 90 tablet 3     temazepam (RESTORIL) 15 mg capsule as needed.   1     triamcinolone (KENALOG) 0.1 % ointment Thin layer bid 80 g 0     VOLTAREN 1 % Gel Apply a thin layer twice daily 100 g 2     methocarbamol (ROBAXIN) 500 MG tablet Take 1 tablet (500 mg total) by mouth 4 (four) times a day. 120 tablet 1     predniSONE (DELTASONE) 50 MG tablet Take 1 tablet (50 mg total) by mouth daily for 5 days. 5 tablet 0     No current facility-administered medications for this visit.        Family History: Significant for hemochromatosis and psoriatic arthritis    Physical Exam:  General Appearance:   Appears agitated.  She is tearful  Vitals:    04/04/17 1057 04/04/17 1105   BP: 118/82    Patient Site: Left Arm    Patient Position: Sitting    Cuff Size: Adult Large    Pulse: 95    Temp: 98.1  F (36.7  C)    TempSrc: Tympanic    SpO2: (!) 88% 92%   Weight: (!) 229 lb (103.9 kg)      Wt Readings from Last 3 Encounters:   04/04/17 (!) 229 lb (103.9 kg)   02/17/17 (!) 230 lb (104.3 kg)   02/08/17 (!) 230 lb 11.2 oz (104.6 kg)     Body mass index is 36.96 kg/(m^2).    Neck exam is performed.  She has dark circles under her eyes.  Ears nose and  throat are clear  Neck is supple with no jugular venous distention  Lungs reveal scattered rhonchi throughout with marked wheezing  Cardiac exam reveals tachycardia  Extremities are negative for edema  She ambulates with a limp

## 2021-06-09 NOTE — TELEPHONE ENCOUNTER
Central PA team  [phone]  Pool: HE PA MED ([id number])          PA has been initiated.       PA form completed and faxed insurance via Cover My Meds     Key:  ERIK MIX-KERN (Key: [id number])      Medication:  BELBUCA 150 MCG    Insurance:  [employer]        Response will be received via fax and may take up to 5-10 business days depending on plan

## 2021-06-09 NOTE — TELEPHONE ENCOUNTER
Who is calling:  Patient.  She was needing to share about the clinic being closed.  She was not aware and very shocked.  Writer allowed patient to process her thoughts.    Reason for Call:  Is having virtual appt with  cardio tomorrow and will be faxing lab order request  From to cardio to fax [phone]. She wants to go [facility] where Dr Stockton is now grounded. Transferred to scheduling.   Date of last appointment with primary care: NA  Okay to leave a detailed message: Yes

## 2021-06-09 NOTE — TELEPHONE ENCOUNTER
Received refill request from pharmacy requesting chronic pain diagnosis updated to Rx to be able to fill for quantity greater than seven day supply.

## 2021-06-09 NOTE — PROGRESS NOTES
Mental Health Visit Note    4/5/2017    Start time: 1100    Stop Time: 1150   Session # 3    Randi Damon is a 61 y.o. female is being seen today for    Chief Complaint   Patient presents with     Mental Health Visit   .     New symptoms or complaints: Pt displaying manic like symptoms with rapid speech, unorganized thought patterns, racing thoughts, difficulty concentrating, poor attention and labile affect. Also reporting anger outbursts and difficulty managing impulse control.  Pt reports she does not know if she has a bipolar diagnosis that was being treated by Psychiatrist, but recently took herself off medication.      Functional Impairment:   Personal: 4  Family: 4  Work: 4  Social:4    Clinical assessment of mental status: Patient presented alert and oriented x3.  She was well-groomed.  Her attire was appropriate.  Patient was cooperative.  Patient motor actively was agitated and eye contact appropriate.  Patients mood was irritable and expansive and affect labile.  Patient s speech and language was rapid & pressured.  Patient attention distractible and thought process flight of ideas.  Concentration was brief.  Patient denied delusions or hallucinations. Patient denied suicidal or homicidal ideation.  Patient denied any long or short term memory problems. Some evidence of impairment in judgment.  She lacks insight and fund of knowledge was adequate.        Suicidal/Homicidal Ideation present: None Reported This Session    Patient's impression of their current status: Pt reporting being off psychiatric medications for 1 month, which is causing noticeable mood changes.     Therapist impression of patients current state: Pt presented in office today very agitated and had a difficult time directing her.  She has been off her medication (she did not remember which ones) for about 1 month.  There is a noticeable shift in her mood and appears to having a manic episode.  Pt was unsure if she has a bipolar  diagnosis, but she presents manic with racing thoughts, rapid speech, irritable/anger outbursts, flight of ideas and easily distractible. She is also very emotional with periods of crying over mild disturbance.  Discussed option of psychiatrist within [facility], but encouraged her to follow up with her Psychiatrist of many years to get back on her medications.  We were not able to focus on treatment plan, did crisis management & referral back to psychiatrist.    Type of psychotherapeutic technique provided: Insight oriented, Client centered, Solution-focused and CBT    Progress toward short term goals:Did not have time to discuss today    Review of long term goals: long-term goals were not discussed    Diagnosis:   Bipolar Disorder, current mixed episode, severe  Major Depression  Generalized Anxiety  Chronic Pain  R/O PTSD    Plan and Follow up: Make an appointment with Psychiatrist TODAY.  Follow up with Pain Center Provider  Take Medications as prescribed  Follow up with Mabel in 1 weeks      Discharge Criteria/Planning: Patient will continue with follow-up until therapy can be discontinued without return of signs and symptoms.    Shanice Merida 4/5/2017      This note was created with help of Dragon dictation software. Grammatical / typing errors are not intentional and inherent to the software.

## 2021-06-09 NOTE — PROGRESS NOTES
Mental Health Visit Note    3/17/2017    Start time: 1000    Stop Time: 1050   Session # 1    Randi Damon is a 61 y.o. female is being seen today for    Chief Complaint   Patient presents with     Mental Health Visit   .     New symptoms or complaints: None    Functional Impairment:   Personal: 4  Family: 4  Work: 4  Social:4    Clinical assessment of mental status: Patient presented alert and oriented x3.  She was well-groomed.  Her attire was appropriate.  Patient was cooperative.  Patient motor actively was normal and eye contact appropriate.  Patients mood was depressed and affect tearful.  Patient s speech was rapid and language was normal.  Patient attention was limited and thought process scattered  Concentration was brief.  Patient denied delusions or hallucinations. Patient denied suicidal or homicidal ideation.  Patient denied any long or short term memory problems. No evidence of impairment in judgment.  She insight and fund of knowledge was adequate.        Suicidal/Homicidal Ideation present: None Reported This Session    Patient's impression of their current status: Pt reports multiple psychosocial stressors that are impacting her daily functioning.     Therapist impression of patients current state: Pt has not been seen since December 22, 2016. Discussed the multiple psychosocial stressors since our last visit.  Pt struggled to stay on topic and concentration was very poor.  She reports going off of some of her Psychotropic medications, with approval from her Psychiatrist Dr. Moreland. Discussed importance of attending psychotherapy on a regular basis and the late cancel/no show policy at our clinic.  Did not complete treatment plan today, will need to continue to discuss at our next session.  With the change in patients behavior today (racing thoughts, rapid speech, difficulty staying on topic) will further assess Bipolar Spectrum Disorder/mood disorder.  Also need to have a PAULETTE signed for  Psychiatrist.     Type of psychotherapeutic technique provided: Insight oriented, Client centered, Solution-focused and CBT    Progress toward short term goals:Continue to discuss short-term goals    Review of long term goals: Continue to discuss long-term goals    Diagnosis:   1. Severe recurrent major depression without psychotic features    2. Generalized anxiety disorder    3. Chronic pain syndrome        Plan and Follow up: Pt will follow up with Mabel in 1 week to continue to discuss/develop treatment plan  Pt will continue to see Dr. Moreland for medication management  Take medications as prescribed  Follow up with Pain Center Provider as scheduled.       Discharge Criteria/Planning: Patient will continue with follow-up until therapy can be discontinued without return of signs and symptoms.    Shanice Merida 3/21/2017      This note was created with help of Dragon dictation software. Grammatical / typing errors are not intentional and inherent to the software.

## 2021-06-10 ENCOUNTER — VIRTUAL VISIT (OUTPATIENT)
Dept: PSYCHOLOGY | Facility: CLINIC | Age: 66
End: 2021-06-10
Payer: MEDICARE

## 2021-06-10 ENCOUNTER — COMMUNICATION - HEALTHEAST (OUTPATIENT)
Dept: NURSING | Facility: CLINIC | Age: 66
End: 2021-06-10

## 2021-06-10 DIAGNOSIS — F33.1 MAJOR DEPRESSIVE DISORDER, RECURRENT EPISODE, MODERATE WITH ANXIOUS DISTRESS (H): Primary | ICD-10-CM

## 2021-06-10 DIAGNOSIS — F41.1 GAD (GENERALIZED ANXIETY DISORDER): ICD-10-CM

## 2021-06-10 PROCEDURE — 90834 PSYTX W PT 45 MINUTES: CPT | Mod: 95 | Performed by: SOCIAL WORKER

## 2021-06-10 ASSESSMENT — ANXIETY QUESTIONNAIRES
7. FEELING AFRAID AS IF SOMETHING AWFUL MIGHT HAPPEN: SEVERAL DAYS
5. BEING SO RESTLESS THAT IT IS HARD TO SIT STILL: SEVERAL DAYS
GAD7 TOTAL SCORE: 11
6. BECOMING EASILY ANNOYED OR IRRITABLE: MORE THAN HALF THE DAYS
GAD7 TOTAL SCORE: 11
4. TROUBLE RELAXING: MORE THAN HALF THE DAYS
1. FEELING NERVOUS, ANXIOUS, OR ON EDGE: MORE THAN HALF THE DAYS
3. WORRYING TOO MUCH ABOUT DIFFERENT THINGS: SEVERAL DAYS
GAD7 TOTAL SCORE: 11
7. FEELING AFRAID AS IF SOMETHING AWFUL MIGHT HAPPEN: SEVERAL DAYS
2. NOT BEING ABLE TO STOP OR CONTROL WORRYING: MORE THAN HALF THE DAYS

## 2021-06-10 ASSESSMENT — PATIENT HEALTH QUESTIONNAIRE - PHQ9
SUM OF ALL RESPONSES TO PHQ QUESTIONS 1-9: 10
SUM OF ALL RESPONSES TO PHQ QUESTIONS 1-9: 10
10. IF YOU CHECKED OFF ANY PROBLEMS, HOW DIFFICULT HAVE THESE PROBLEMS MADE IT FOR YOU TO DO YOUR WORK, TAKE CARE OF THINGS AT HOME, OR GET ALONG WITH OTHER PEOPLE: SOMEWHAT DIFFICULT

## 2021-06-10 NOTE — PROGRESS NOTES
"Randi Damon is a 65 y.o. female who is being evaluated via a billable video visit.      The patient has been notified of following:     \"This video visit will be conducted via a call between you and your physician/provider. We have found that certain health care needs can be provided without the need for an in-person physical exam.  This service lets us provide the care you need with a video conversation.  If a prescription is necessary we can send it directly to your pharmacy.  If lab work is needed we can place an order for that and you can then stop by our lab to have the test done at a later time.    Video visits are billed at different rates depending on your insurance coverage. Please reach out to your insurance provider with any questions.    If during the course of the call the physician/provider feels a video visit is not appropriate, you will not be charged for this service.\"    Patient has given verbal consent to a Video visit? Yes  How would you like to obtain your AVS? AVS Preference: MyChart.  If dropped by the video visit, the video invitation should be sent to: Send to e-mail at: [email or web address]  Will anyone else be joining your video visit? No    Patient is here for a follow up appointment with Dr. Dubois. Patient has focus on mental health. CSA, REBEKAH, NDI and UDT are deferred due to COVID 19.     Pamela Ayers LPN  "

## 2021-06-10 NOTE — PROGRESS NOTES
Outpatient Mental Health Treatment Plan    Name:  Randi Damon  :  1955  MRN:  [id number]    Treatment Plan:  Initial Treatment Plan  Intake/initial treatment plan date:  2017  Benefit and risks and alternatives have been discussed: Yes  Is this treatment appropriate with minimal intrusion/restrictions: Yes  Estimated duration of treatment:  Approximately 20+ sessions.  Anticipated frequency of services:  Every 1 weeks  Necessity for frequency: This frequency is needed to establish therapeutic goals and for continuity of care in order to monitor progress.  Necessity for treatment: To address cognitive, behavioral, and/or emotional barriers in order to work toward goals and to improve quality of life.    Plan:      ? Depression    Goal:  Decrease average depression level from 22 to 10 or below.   Strategies:    ?[x] Decrease social isolation     [x] Increase involvement in meaningful activities     ?[x] Discuss sleep hygiene     ?[x] Explore thoughts and expectations about self and others     ?[x] Process grief (loss of significant person, independence, role, etc.)     ?[x] Assess for suicide risk     ?[x] Implement physical activity routine (with physician approval)     [x] Consider introduction of bibliotherapy and/or videos     [x] Emotion regulation (DBT skills)     X- self-care strategies     X- communication strategies     X- Cognitive Restructuring     X- Maintain medication compliance      X- regular attendance with Psychiatrist       ?  ?Degree to which this is a problem: 4  Degree to which goal is met: 0  Date of Review:             ?   ? Anxiety & Manic Symptoms   Goal:  Decrease average anxiety level from 12 to 6 or below.   Strategies: ? [x]Learn and practice relaxation techniques and other coping strategies (e.g., thought stopping, reframing, meditation)     ? [x] Increase involvement in meaningful activities     ? [x] Discuss sleep hygiene     ? [x] Explore thoughts and expectations  about self and others     ? [x] Identify and monitor triggers for panic/anxiety symptoms     ? [x] Implement physical activity routine (with physician approval)     ? [x] Consider introduction of bibliotherapy and/or videos     ? [x] Continue compliance with medical treatment plan (or explore barriers)      X- Anger management strategies     X- stress management skills      X- impulse control skills           X- consider EMDR                                  Degree to which this is a problem: 4  Degree to which goal is met: 0  Date of Review:     Chronic pain  Goal:  ? Decrease average pain level from 8/10  to 5/10.      Strategies: ? [x] Explore thoughts and expectations about self and others         [x] Explore emotional reactions to illness/injury      ? [x] Learn and practice relaxation techniques and other coping strategies            [x] Implement physical activity routine (with physician approval)     ? [x] Engage in values clarification and goal-setting     ? [x] Consider introduction of bibliotherapy and/or videos     ? [x] Increase involvement in meaningful activities     ? [x] Discuss sleep hygiene     ? [x] Process grief (loss of significant person, independence, role   etc.)     ? [x] Assess for suicide risk     ?  Degree to which this is a problem: 4  Degree to which goal is met: 0  Date of Review:        Functional Impairment:  1=Not at all/Rarely  2=Some days  3=Most Days  4=Every Day    Personal : 4  Family : 4  Social : 4   Work/school : 4    Diagnosis:  Bipolar Disorder, mixed, severe  Major Depression, severe  Anxiety Disorder  R/O PTSD    WHODAS 2.0 12-item version 50%  H1= 30  H2= 30  H3= 30     Scores presented in qualifiers to represent level of disability.     NO problem - (none, absent, negligible,  ) - 0-4 %   MILD problem - (slight, low, ) - 5-24 %   MODERATE problem - (medium, fair,...) - 25-49 %   SEVERE problem - (high, extreme,  ) - 50-95 %   COMPLETE problem - (total, ) -   %         Clinical assessments and measures completed:. CHAVO-7, PHQ-9, Mood Questionnaire current, CAGE-AID and PANSI     Strengths:  Motivated, intelligent, insightful   Limitations:  Physical Limitation, lack of support system   Cultural Considerations: Pt is a 61 year old, marrried,  female w/ hx of trauma & chronic pain.     Persons responsible for this plan: Patient and Provider            Psychotherapist Signature           Patient Signature:              Guardian Signature             Provider: Performed and documented by ASHKAN Brandon   Date:  4/24/2017      This note was created with help of Dragon dictation software.  Grammatical / typing errors are not intentional and inherent to the software.
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
## 2021-06-10 NOTE — TELEPHONE ENCOUNTER
RN Triage:     See message from the pain clinic.     Psychiatrist (Dr. Serrano) increased pristiq 100 mg one month ago. She is having problems with restless legs and this is causing her not to be able to sleep. She stated she spoke to her psychiatrist on Sunday and he told her it might be Seritonin syndrome and not to take the pristiq on Sunday. She held the medication. She took the pristiq 50 mg last night. She had a pain clinic appointment and spoke about her restless legs.   She also has diarrhea from metformin 500 mg daily. She stated one episode yesterday.   She is stating she is sweating a lot around her hairline she is having  weakness, dizziness, nauseated, some vertigo, restless legs, and palpitations and insomnia. She has been up for one week straight per patient. She is unable to keep still and this has been going on for days. She is crying and yelling on the phone while apologizing for being upset. Advised ED for evaluation of weakness, dizziness and palpitations.    Advised to have someone take her, she agreed.   Yajaira Snyder RN, BSN [facility] Triage Nurse      Reason for Disposition    Dizziness, lightheadedness, or weakness    Additional Information    Negative: SEVERE difficulty breathing (e.g., struggling for each breath, speaks in single words)    Negative: Bluish (or gray) lips or face    Negative: Difficult to awaken or acting confused  (e.g., disoriented, slurred speech)    Negative: Hysterical or combative behavior    Negative: Sounds like a life-threatening emergency to the triager    Negative: Shock suspected (e.g., cold/pale/clammy skin, too weak to stand, low BP, rapid pulse)    Negative: Difficult to awaken or acting confused (e.g., disoriented, slurred speech)    Negative: Fainted, and still feels dizzy afterwards    Negative: SEVERE difficulty breathing (e.g., struggling for each breath, speaks in single words)    Negative: Overdose (accidental or intentional) of medications     Negative: New neurologic deficit that is present now: * Weakness of the face, arm, or leg on one side of the body * Numbness of the face, arm, or leg on one side of the body * Loss of speech or garbled speech    Negative: Heart beating < 50 beats per minute OR > 140 beats per minute    Negative: Sounds like a life-threatening emergency to the triager    Negative: Chest pain    Negative: Rectal bleeding, bloody stool, or tarry-black stool    Negative: Vomiting is the main symptom    Negative: Diarrhea is the main symptom    Negative: Headache is the main symptom    Negative: Heat exhaustion suspected (i.e., dehydration from heat exposure)    Negative: Patient states that he/she is having an anxiety/panic attack    Spinning or tilting sensation (vertigo) present now and one or more stroke risk factors (i.e., hypertension, diabetes, prior stroke/TIA, heart attack, age over 60) (Exception: prior physician evaluation for this AND no different/worse than usual)    Extra heart beats OR irregular heart beating (i.e., 'palpitations')    Negative: SEVERE headache or neck pain    Negative: SEVERE dizziness (e.g., unable to stand, requires support to walk, feels like passing out now)    Negative: Loss of vision or double vision    Negative: Passed out (i.e., fainted, collapsed and was not responding)    Negative: Shock suspected (e.g., cold/pale/clammy skin, too weak to stand, low BP, rapid pulse)    Negative: Difficult to awaken or acting confused (e.g., disoriented, slurred speech)    Negative: Visible sweat on face or sweat dripping down face    Negative: Unable to walk, or can only walk with assistance (e.g., requires support)    Negative: Received SHOCK from implantable cardiac defibrillator and has persisting symptoms (i.e., palpitations, lightheadedness)    Negative: Sounds like a life-threatening emergency to the triager    Negative: Chest pain    Negative: Difficulty breathing    Protocols used: HEART RATE AND  HEARTBEAT [id number], ANXIETY AND PANIC ATTACK-A-OH, DIZZINESS-A-OH

## 2021-06-10 NOTE — PROGRESS NOTES
Mental Health Visit Note    4/13/2017   Start time: 1000    Stop Time: 1050   Session #4    Randi Damon is a 61 y.o. female is being seen today for    Chief Complaint   Patient presents with     Mental Health Visit   .     New symptoms or complaints: none reported    Functional Impairment:   Personal: 4  Family: 4  Work: 4  Social:4    Clinical assessment of mental status: Patient presented alert and oriented x3.  She was well-groomed.  Her attire was appropriate.  Patient was cooperative.  Patient motor actively was normal and eye contact appropriate.  Patients mood was anxious and affect congruent.  Patient s speech and language was normal.  Patient attention and thought process brief.  Concentration was distracted.  Patient denied delusions or hallucinations. Patient denied suicidal or homicidal ideation.  Patient denied any long or short term memory problems. No evidence of impairment in judgment.  She has insight and fund of knowledge was adequate.        Suicidal/Homicidal Ideation present: None Reported This Session    Patient's impression of their current status: Pt reporting ongoing symptoms of depression, anxiety and chronic pain.     Therapist impression of patients current state: Pt saw her psychiatrist since her last visit and was put back on medications.  Today patient appears more stable, able to focus and decreased racing thoughts/speech.  Discussed diagnosis of bipolar disorder & importance of maintaining medication compliance.  Pt discussed long periods of major depression, but also has experienced manic episodes (anger outbursts/irritible mood, decreased need for sleep, increased/pressured speech, flight of ideas, distractibility, impulsive behaviors).  Pt speech was not pressured, but continues to have flight of ideas.  She was able to maintain a conversation today & states she continues to have anger outbursts.  Discussed family hx of mental health concerns.  Discussed hx of family  dynamics & concerns. Able to better focus on some small short-term and long-term goals, but need to complete at next session    Type of psychotherapeutic technique provided: Insight oriented, Client centered, Solution-focused and CBT    Progress toward short term goals: anger management, chronic pain management, emotional regulation, PTSD education, self-forgiveness, coping skills, stress management skills    Review of long term goals: Improve ability to cope with chronic pain & decrease anger outbursts & depression symptoms.     Diagnosis:   Bipolar disorder, severe, mixed episode  Major Depression, recurrent, severe  Generalized Anxiety Disorder  Chronic Pain syndrome  R/O PTSD    Plan and Follow up: Continue to take medications as prescribed & follow up with Psychiatrist  Follow up with Primary Care provider  Follow up with Pain Center Provider  Practice coping strategies as discussed  Follow up with Mabel in 1 week      Discharge Criteria/Planning: Patient will continue with follow-up until therapy can be discontinued without return of signs and symptoms.    Shanice Merida 4/19/2017      This note was created with help of Dragon dictation software. Grammatical / typing errors are not intentional and inherent to the software.

## 2021-06-10 NOTE — TELEPHONE ENCOUNTER
Medication being requested: butrans 10 mcg  patch  Last visit date: 6/18  Provider: ELVIN  Next visit date:  8/24 Provider: ELVIN  Expected follow up: 8 weeks  MTM visit (Pain Center) date: no  UDT 07/23/2019  CSA 06/06/2019   (Last fill date; name; strength; provider; MME; quantity):  7/21-last fill for 28 days  Pertinent between visit information about requested medication (telephone, mychart, prior authorization, concerns, comments):  NA  Script being sent to provider by nurse- dates and quantity:   Pharmacy cued: [facility]  Standing orders for withdrawal protocol implemented: NA

## 2021-06-10 NOTE — PROGRESS NOTES
Patient asked for Cpap to be ordered prior to being seen. Randi asked us to send Rx for Cpap to [facility]. Dr. Newton will put Rx in for Cpap even though this may not be the proper treatment for her with out being seen first.

## 2021-06-10 NOTE — TELEPHONE ENCOUNTER
"RN Triage:   Status Update.  Patient calling back.   She is feeling better.   She did not go into the hospital, the doctor called back  Dr. Dubois, called from the pain clinic and was prescribed gabapentin. She slept from 6-9. She slept from 3am to 630am and up today. NO pristiq yesterday, she took 50 mg of the pristiq today. She thinks she is still having diarrhea once today. (yellow) Non bloody or black.She stated that she has diarrhea a lot and is concerned that this is caused by her metformin. She has increased her fiber. She was called by her psychiatrist today.   She is requesting some lab work to be run to check for \"an acid bas imbalance\" recommended a virtual visit with her PCP or an in office visit and was transferred to scheduling.           Reason for Disposition    Requesting regular office appointment    Additional Information    Negative: RN needs further essential information from caller in order to complete triage    Negative: [1] Caller is not with the adult (patient) AND [2] reporting urgent symptoms    Negative: Lab result questions    Negative: Medication questions    Negative: Caller can't be reached by phone    Negative: Caller has already spoken to PCP or another triager    Protocols used: INFORMATION ONLY CALL-A-AH      "

## 2021-06-10 NOTE — PATIENT INSTRUCTIONS - HE
PLAN:  Discussed he may have serotonin syndrome from increasing the Pristiq, will now have the serotonin discontinuation syndrome from stopping Pristiq suddenly.  Change to Pristiq 50 mg for 5 days, then may take 1 every other day for 5 doses and stop.    Discussed you have noticed sensitivity to gluten, which may exacerbate restless leg syndrome.  Discussed stopping gluten and all forms in her diet.    Discontinue the amino acid supplement as that may be contributing to some of her symptoms since    Pheochromocytoma.    You may resume the magnesium supplementation to see if it helps with restless legs.    Continue the Butrans patch 10 mcg weekly.    Call Dr. Dubois the end of the week with an update and follow-up 4 weeks in the office
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
## 2021-06-10 NOTE — PROGRESS NOTES
Mental Health Visit Note    5/2/2017    Start time: 1000    Stop Time: 1050   Session # 6    Randi Damon is a 61 y.o. female is being seen today for    Chief Complaint   Patient presents with     Mental Health Visit   .     New symptoms or complaints: None    Functional Impairment:   Personal: 4  Family: 3  Work: NA  Social:3    Clinical assessment of mental status: Patient presented alert and oriented x3.  She was well-groomed.  Her attire was appropriate.  Patient was cooperative.  Patient motor actively was normal and eye contact appropriate.  Patients mood was anxious and affect congruent.  Patient s speech was rapid and language was normal.  Patient attention and thought process flight of thoughts.  Concentration was brief.  Patient denied delusions or hallucinations. Patient denied suicidal or homicidal ideation.  Patient denied any long or short term memory problems. No evidence of impairment in judgment.   She has insight and fund of knowledge was adequate.        Suicidal/Homicidal Ideation present: None Reported This Session    Patient's impression of their current status: Pt reporting increased anxiety as her surgery is in 3 weeks and she feels she has a lot of things to take care of before it happens.     Therapist impression of patients current state: Pt went through her list of things she needs to get done by the time she has surgery.  We worked on prioritizing her list & breaking the list down into more attainable/reasonable goals.  Encouraged patient to write down on a calendar her daily tasks/goals.     Type of psychotherapeutic technique provided: Insight oriented, Client centered, Solution-focused and CBT    Progress toward short term goals:Progress as expected, problem-solving strategies    Review of long term goals: Date of last review 4/19/17    Diagnosis:   1. Bipolar disorder, current episode mixed, severe, without psychotic features    2. Generalized anxiety disorder    3. Chronic  pain syndrome        Plan and Follow up: Write down list of goals/tasks  Prioritize the list & break it down into small attainable daily tasks (write it on a calendar)  Practice self-care strategies  Take medications as prescribed  Follow up with all providers as scheduled  Follow up with Mabel in 1 week      Discharge Criteria/Planning: Patient will continue with follow-up until therapy can be discontinued without return of signs and symptoms.    Shanice Merida 5/2/2017      This note was created with help of Dragon dictation software. Grammatical / typing errors are not intentional and inherent to the software.

## 2021-06-10 NOTE — TELEPHONE ENCOUNTER
"Patient states she needs an appointment with Dr. Stockton and was transferred to triage.  Declines triage.  States had a virtual visit today and \"they did the wrong test\" stating a metabolic panel was done and that is not the correct lab test  per patient report.  Requests again an appointment with Dr. Stockton.   Patient also states she plans to go to the ED today for an in-person visit versus virtual.     Per [facility] this RN reviewed today's After Visit Summary with patient.  Offered again to triage current symptoms and advise accordingly. Patient declined.  Transferred to  for an appointment per her request.    Protocol- Information Only  Care advice given  Disposition- Per Visit Selection Guideline  Transferred to .    Gricel Lafleur RN BSN PHN  Triage Nurse Advisor    "

## 2021-06-10 NOTE — PROGRESS NOTES
"Randi Damon is a 65 y.o. female who is being evaluated via a billable telephone visit.      The patient has been notified of following:     \"This telephone visit will be conducted via a call between you and your physician/provider. We have found that certain health care needs can be provided without the need for a physical exam.  This service lets us provide the care you need with a short phone conversation.  If a prescription is necessary we can send it directly to your pharmacy.  If lab work is needed we can place an order for that and you can then stop by our lab to have the test done at a later time.    Telephone visits are billed at different rates depending on your insurance coverage. During this emergency period, for some insurers they may be billed the same as an in-person visit.  Please reach out to your insurance provider with any questions.    If during the course of the call the physician/provider feels a telephone visit is not appropriate, you will not be charged for this service.\"    Patient has given verbal consent to a Telephone visit? Yes    What phone number would you like to be contacted at? [phone]    Patient would like to receive their AVS by not needed    Additional provider notes: 65-year-old female following up via virtual visit to discuss increased problems with restless leg syndrome, diarrhea, sweating, insomnia for the last 5 days.  She has a medical history significant for chronic pain syndrome, restless leg syndrome, vitamin B deficiency, hemochromatosis, anxiety, and depression.  She has been experiencing increased sweating restlessness and diarrhea since Sunday.  She contacted her mental health provider and she was told to diminish the dose of her Pristiq she has been taking 50 mg doses on alternate days.  Today she does not want to take the medication because she states that she is sick of it\".  She thinks that she has serotonin syndrome.  She denies any fainting.  She has " "no nausea.  No headaches.  She says she has not been able to sleep.  She also talked to her pharmacist and stopped the gabapentin because they both thought the dose was too high.  She says her pain is increasing.  She states that she had 2 loose stools yesterday that were not bloody.  She also states that she has not been taking her BuSpar for anxiety.  She is worried that she may have \"\" on electrolyte imbalance\" she wants lab test.  She denies any shortness of breath, chest pain, altered vision or dizziness.    Assessment/Plan:  1. Anxiety state, unspecified  Patient was very anxious today her speech was pressured with difficult for her to let me speak she admits that her thoughts have been disorganized.  I will check a CMP.  I would like her to restart her BuSpar and increase the dose to 15 mg twice daily.  - Comprehensive Metabolic Panel    2. Adjustment insomnia  Patient is convinced that taking the Pristiq is giving her serotonin syndrome and this is making her sleep poor, she will continue the Pristiq as directed by her psychiatrist 50 mg on alternate days.  She should take the gabapentin which will help with sleep.    3. Diarrhea, unspecified type  Patient denies any GI symptoms she has been on the Metformin for a long period of time her last A1c was 6.1 I will be checking a metabolic panel today  - Comprehensive Metabolic Panel    4. Sweating increase  Patient states that she is sweating profusely denies any palpitations I have asked her to follow-up with her PCP within the next 10 days she has had a multitude of virtual visits.      Phone start time 10:31 AM  Phone end time 11:11 AM  Phone call duration:  40 minutes    Sixto CORONADO    "

## 2021-06-10 NOTE — PROGRESS NOTES
Dear Loida Stockton MD  [address] [place] [address],    Thank you for the opportunity to participate in the care of Randi Damon.     She is a 61 y.o. y/o female patient who comes to the sleep medicine clinic for follow up.    We had an extensive conversation to review the results of her sleep study.    The overnight polysomnography was completed with a digital sleep system using the international 10-20 electrode placement for recording EEG, EOG, EMG from chin, ECG, respiratory effort, oximetry, body position, airflow, nasal pressure, snoring sound, pulse rate and limb movement channels.    The study was completed as a split night study.    1. During the diagnostic portion of the study respiratory monitoring showed severe obstructive sleep apnea/hypopnea (AHI=36.9)  2. A trial of nasal CPAP was initiated given the severity of sleep-disordered breathing and CPAP pressures from 5 cwp to 11 cwp were sampled during the night.  3. CPAP pressures higher than 7 cwp were effective at eliminating obstructive respiratory events but the patient had borderline low oxygen saturations and low tidal volumes.  4. A trial of Bi-level PAP in Spontaneous (S) mode was initiated but it was associated with central respiratory events.   5. There were frequent leg movements that were associated with arousals and led to post-arousal respiratory events. The patient normally takes Pramipexole 0.5 to 1 mg PO prior to bedtime but she did not bring her medication the night of the test.       We reviewed the oxygen saturation graph as well as the result tables from the report.      Past Medical History:   Diagnosis Date     Breast cancer 1994     Hx antineoplastic chemotherapy      Hx of radiation therapy        Past Surgical History:   Procedure Laterality Date     BREAST BIOPSY       BREAST LUMPECTOMY Left 1994     ND ARTHRODESIS,ANKLE,OPEN      Description: Ankle Arthrodesis;  Recorded: 04/07/2010;     ND MASTECTOMY,  MODIFIED RADICAL      Description: Modified Radical Mastectomy Left Breast;  Recorded: 04/07/2010;     MD REMOVE TONSILS/ADENOIDS,<13 Y/O      Description: Tonsillectomy With Adenoidectomy;  Recorded: 04/07/2010;     REDUCTION MAMMAPLASTY Right     approx late [id number]       Social History     Social History     Marital status:      Spouse name: N/A     Number of children: N/A     Years of education: N/A     Occupational History     Not on file.     Social History Main Topics     Smoking status: Former Smoker     Smokeless tobacco: Not on file     Alcohol use No     Drug use: No     Sexual activity: Not on file     Other Topics Concern     Not on file     Social History Narrative       Review of Systems:  General: No weight gain, no weight loss  Eyes: No vision changes  ENT: No hearing changes  Cardio: No chest pain, no nocturnal dyspnea  Respiratory: No shortness of breath, no cough  Gastrointestinal: No diarrhea, no constipation  Genitourinary: No excessive urination  Tegumentary: No rashes  Neurological: No seizures, no loss of consciousness  Endo: No heat or cold intolerance.    Current Outpatient Prescriptions   Medication Sig Dispense Refill     albuterol (PROVENTIL) 2.5 mg /3 mL (0.083 %) nebulizer solution Take 3 mL (2.5 mg total) by nebulization every 6 (six) hours as needed for wheezing. 75 mL 12     budesonide-formoterol (SYMBICORT) 160-4.5 mcg/actuation inhaler Inhale 2 puffs 2 (two) times a day.       cyanocobalamin, vitamin B-12, 2,500 mcg Subl Place under the tongue 2 (two) times a week.       DULoxetine (CYMBALTA) 30 MG capsule   1     fluticasone (FLONASE) 50 mcg/actuation nasal spray 2 sprays at night 16 g 12     folic acid (FOLVITE) 1 MG tablet TAKE 1 TABLET (1 MG) BY ORAL ROUTE ONCE DAILY 90 tablet 3     hydrochlorothiazide (MICROZIDE) 12.5 mg capsule TAKE 1 CAPSULE BY MOUTH DAILY. 90 capsule 3     [START ON 5/22/2017] HYDROcodone-acetaminophen (NORCO )  mg per tablet  "Take 1 tablet by mouth every 6 (six) hours as needed for pain. 112 tablet 0     levothyroxine (SYNTHROID) 150 MCG tablet Alternate one tablet with her other dose of 175 g.  Or take this tablet every other day. 15 tablet 11     levothyroxine (SYNTHROID, LEVOTHROID) 175 MCG tablet Every other day 20 tablet 11     LORazepam (ATIVAN) 1 MG tablet as needed.  0     omeprazole (PRILOSEC) 20 MG capsule TAKE ONE CAPSULE BY MOUTH TWO TIMES A  capsule 2     pramipexole (MIRAPEX) 1 MG tablet Take 0.5 - 1 tab daily PRN 90 tablet 3     temazepam (RESTORIL) 15 mg capsule as needed.   1     tiotropium (SPIRIVA) 18 mcg inhalation capsule Place 18 mcg into inhaler and inhale daily.       triamcinolone (KENALOG) 0.1 % ointment Thin layer bid 80 g 0     VITAMIN D2 50,000 unit capsule TAKE 1 CAPSULE (50,000 UNITS TOTAL) BY MOUTH 2 (TWO) TIMES A WEEK. 8 capsule 11     VOLTAREN 1 % Gel Apply a thin layer twice daily 100 g 2     methocarbamol (ROBAXIN) 500 MG tablet Take 1 tablet (500 mg total) by mouth 4 (four) times a day. 120 tablet 1     No current facility-administered medications for this visit.        Allergies   Allergen Reactions     Cephalexin Other (See Comments)     Muscles aches,fatigue     Levofloxacin      Penicillins      Patient states she isn't allergic to this anymore.      Sulfa (Sulfonamide Antibiotics)        Physical Exam:  Pulse 88  Ht 5' 6\" (1.676 m)  Wt (!) 228 lb (103.4 kg)  SpO2 95%  BMI 36.8 kg/m2  BMI:Body mass index is 36.8 kg/(m^2).   GEN: NAD, obese     Labs/Studies:  - We reviewed the results of the overnight PSG as described on the HPI.     Lab Results   Component Value Date    WBC 4.8 05/12/2017    HGB 17.0 (H) 05/12/2017    HCT 49.5 (H) 05/12/2017    MCV 94 05/12/2017     05/12/2017         Chemistry        Component Value Date/Time     05/12/2017 1236    K 3.2 (L) 05/12/2017 1236     05/12/2017 1236    CO2 28 05/12/2017 1236    BUN 10 05/12/2017 1236    CREATININE 0.72 " 05/12/2017 1236    GLU 94 05/12/2017 1236        Component Value Date/Time    CALCIUM 9.5 05/12/2017 1236    ALKPHOS 77 02/08/2017 1112    AST 15 02/08/2017 1112    ALT 15 02/08/2017 1112    BILITOT 0.4 02/08/2017 1112           Assessment and Plan:  In summary Randi Damon is a 61 y.o. year old female here for follow up.    1. Obstructive Sleep Apnea  We had an extensive conversation to review the results of her sleep study and to  her on the importance of treating sleep apnea.   We will order a CPAP device with a pressure of 11 cwp.  The study showed borderline-low oxygen saturations and we may need to check her overnight oximetry after she is using CPAP.  She already contacted a DME ([employer]) and asked us to fax the order there. I will provide the contact information for other local companies in case she wants to check other options.  She will start using the device as soon as she receives it with the intention to use if for the entire night.  We discussed some tips to increase PAP tolerance as well as the normal curve of adaptation. CPAP is going to provide improved respiratory function during the night but it can cause some sleep disruption that tends to improve with continuous usage.  She should return to the clinic in 10 weeks to review compliance and efficacy monitoring.  We talked about insurance requirements and I encourage the patient to learn the specific details of her health insurance plan regarding durable medical equipment.    2. Restless Legs Syndrome.  I discussed with Randi the need to keep her Mirapex dose as low as possible.  She will try to take 0.5 mg every night rather than 1 mg.     Patient verbalized understanding of these issues, agrees with the plan and all questions were answered today. Patient was given an opportuntity to voice any other symptoms or concerns not listed above. Patient did not have any other symptoms or concerns.     Patient instructed to stop at   to schedule appointment before leaving today.    MD PRETTY Stoner Board Certified in Internal Medicine and Sleep Medicine  [facility].    We spent a total of 45 minutes of face-to-face encounter and more than 50% of the encounter was used for counseling or coordination of care.

## 2021-06-10 NOTE — TELEPHONE ENCOUNTER
See triage encounter from today (08/27/2020)      FYI - Status Update  Who is Calling: Patient  Update: patient was transferred from triage. States patient declined triage and needed an appointment with her pcp only.  Per patient, she believes Dr Stroud ordered the wrong test today at her virtual visit. Also mentioned that since she started Serotonin she has been having restless legs/body, headaches nausea, no appetite, dizziness, lack of sleep and trouble breathing. She also stated since she started metformin she's been having diarrhea x2 a day and will be going to the ER later this evening for further evaluation.  Okay to leave a detailed message?:  Yes   Not sure if patient will be really going to the ER but with her trouble breathing as red flag I reached out to roma and she will have someone look into it.
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
## 2021-06-10 NOTE — PROGRESS NOTES
Order for Durable Medical Equipment was processed and equipment ordered.   DME provider: [facility]   Date Faxed: 5/19/17  Ordering Provider: Dr. Newton  Equipment ordered: Cpap
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
## 2021-06-10 NOTE — PROGRESS NOTES
Preoperative Consultation    Randi Damon   61 y.o.  female    Date of visit: 5/12/2017    This is a preoperative consultation requested by Dr. Moise in preparation for right shoulder arthroscopy, decompression and RCR on May 19, 2017 at [place] [facility].    Assessment and Plan:  Randi Damon was seen in preoperative consultation in preparation for right shoulder arthroscopy.  This is a low risk surgery and the patient has no increased risk for major cardiac complications based on exam and history and appears to be medically stable for the proposed surgery/procedure.    1. Pre-operative examination for internal medicine  Randi is medically stable to have right shoulder arthroscopy.  Currently, she is free of infectious diseases and cardiovascular symptoms.  Of note, she has underlying COPD and obstructive sleep apnea along with obesity.  She denies any untoward complications with anesthesia.  There is no personal or family history of DVT or or.    She is instructed to withhold her prescription medications on the morning of surgery.  She will take her omeprazole on the evening before surgery.    - Electrocardiogram Perform and Read  - HM2(CBC w/o Differential)  - Basic Metabolic Panel  - INR  - Urinalysis-UC if Indicated    2. Anxiety state, unspecified  General mental health status has improved with resumption of medications    3. Acquired hypothyroidism  Recent medication change    4. Essential hypertension with goal blood pressure less than 140/90  At goal on minimal medication    5. Sleep apnea, obstructive  Recent sleep study suggesting obstructive sleep apnea.  She does not have a sleep apnea device yet    6. COPD (chronic obstructive pulmonary disease)  Long-standing history of COPD with intermittent exacerbation.  Currently, she is stable.      Patient Active Problem List   Diagnosis     Psoriatic Arthropathy     Osteopenia     Restless Legs Syndrome     Vitamin B12  Deficiency     Depression     Hypothyroidism     Vitamin D Deficiency     Hemochromatosis     Emphysema     Snoring (Symptom)     Impaired Fasting Glucose     Breast Cancer     Morbid Obesity     Incidental Adrenal Cortical Adenoma     Hypertension     Esophageal Reflux     Psoriasis     Spinal Stenosis     Benign Adenomatous Polyp Of The Large Intestine     Joint Pain, Localized In The Hip     Anxiety state, unspecified     Sacroiliitis     Neck pain     Acute pain of right shoulder     COPD (chronic obstructive pulmonary disease)     Sleep apnea, obstructive       HPI:   Randi is a pleasant 61-year-old female who is here today for physical examination prior to having anesthesia for right shoulder pain and resultant arthroscopy, decompression.  She is eagerly anticipating an improvement of her pain situation, but is dreading the 6 week recovery.  She will be discharged to home with her  who will help provide her care.    Pertinent anesthesia history is reviewed.  She currently denies any symptoms of infectious diseases.  She has no personal or family history of untoward reactions with both general or local anesthetic agents.  She denies hyperthermia, anaphylaxis or skin rashes with anesthesia.  She does, however, have an underlying history of COPD and obstructive sleep apnea.  She does not have primary coronary artery disease or chronic kidney disease.  She is currently being evaluated for an adrenal adenoma.    Her medical history is significant for multiple endocrine issues along with breast cancer-remote and hemochromatosis.      Review of systems:  A comprehensive review of systems was performed and was otherwise negative    Current Outpatient Prescriptions   Medication Sig Dispense Refill     albuterol (PROVENTIL) 2.5 mg /3 mL (0.083 %) nebulizer solution Take 3 mL (2.5 mg total) by nebulization every 6 (six) hours as needed for wheezing. 75 mL 12     budesonide-formoterol (SYMBICORT) 160-4.5  mcg/actuation inhaler Inhale 2 puffs 2 (two) times a day.       cyanocobalamin, vitamin B-12, 2,500 mcg Subl Place under the tongue 2 (two) times a week.       DULoxetine (CYMBALTA) 30 MG capsule   1     fluticasone (FLONASE) 50 mcg/actuation nasal spray 2 sprays at night 16 g 12     folic acid (FOLVITE) 1 MG tablet TAKE 1 TABLET (1 MG) BY ORAL ROUTE ONCE DAILY 90 tablet 3     hydrochlorothiazide (MICROZIDE) 12.5 mg capsule TAKE 1 CAPSULE BY MOUTH DAILY. 90 capsule 3     HYDROcodone-acetaminophen (NORCO )  mg per tablet Take 1 tablet by mouth every 6 (six) hours as needed for pain. 112 tablet 0     levothyroxine (SYNTHROID) 150 MCG tablet Alternate one tablet with her other dose of 175 g.  Or take this tablet every other day. 15 tablet 11     levothyroxine (SYNTHROID, LEVOTHROID) 175 MCG tablet Every other day 20 tablet 11     LORazepam (ATIVAN) 1 MG tablet as needed.  0     omeprazole (PRILOSEC) 20 MG capsule TAKE ONE CAPSULE BY MOUTH TWO TIMES A  capsule 2     pramipexole (MIRAPEX) 1 MG tablet Take 0.5 - 1 tab daily PRN 90 tablet 3     temazepam (RESTORIL) 15 mg capsule as needed.   1     tiotropium (SPIRIVA) 18 mcg inhalation capsule Place 18 mcg into inhaler and inhale daily.       triamcinolone (KENALOG) 0.1 % ointment Thin layer bid 80 g 0     VITAMIN D2 50,000 unit capsule TAKE 1 CAPSULE (50,000 UNITS TOTAL) BY MOUTH 2 (TWO) TIMES A WEEK. 8 capsule 11     VOLTAREN 1 % Gel Apply a thin layer twice daily 100 g 2     methocarbamol (ROBAXIN) 500 MG tablet Take 1 tablet (500 mg total) by mouth 4 (four) times a day. 120 tablet 1     No current facility-administered medications for this visit.        Allergies   Allergen Reactions     Cephalexin Other (See Comments)     Muscles aches,fatigue     Levofloxacin      Penicillins      Patient states she isn't allergic to this anymore.      Sulfa (Sulfonamide Antibiotics)        Recent antiplatelet use: no    Steroid use in the past year: no    Past  "difficulty with anesthesia:  no    Personal or family history of difficulty with anesthesia: no    Current cardiopulmonary symptoms: no        Past Surgical History:   Procedure Laterality Date     BREAST BIOPSY       BREAST LUMPECTOMY Left 1994     RI ARTHRODESIS,ANKLE,OPEN      Description: Ankle Arthrodesis;  Recorded: 04/07/2010;     RI MASTECTOMY, MODIFIED RADICAL      Description: Modified Radical Mastectomy Left Breast;  Recorded: 04/07/2010;     RI REMOVE TONSILS/ADENOIDS,<13 Y/O      Description: Tonsillectomy With Adenoidectomy;  Recorded: 04/07/2010;     REDUCTION MAMMAPLASTY Right     approx late [id number]     Family History   Problem Relation Age of Onset     Heart disease Father      Breast cancer Neg Hx      Social History   Substance Use Topics     Smoking status: Former Smoker     Smokeless tobacco: Not on file     Alcohol use No        Physical Exam:    General Appearance:   She appears at baseline.  Her weight is down about 4 pounds.  She is mildly tachycardic.  Repeat heart rate is 90    Vitals:    05/12/17 1144   BP: 124/72   Patient Site: Left Arm   Patient Position: Sitting   Cuff Size: Adult Large   Pulse: (!) 106   Weight: (!) 228 lb (103.4 kg)   Height: 5' 6\" (1.676 m)     Wt Readings from Last 3 Encounters:   05/12/17 (!) 228 lb (103.4 kg)   05/03/17 (!) 232 lb (105.2 kg)   04/24/17 (!) 233 lb 12.8 oz (106.1 kg)       EYES: Eyelids, conjunctiva, and sclera were normal. Pupils were normal. Cornea, iris, and lens were normal bilaterally.  HEAD, EARS, NOSE, MOUTH, AND THROAT: Head and face were normal. Hearing was normal to voice and the ears were normal to external exam. Nose appearance was normal and there was no discharge. Oropharynx was normal.  TMs were normal.  NECK: Neck appearance was normal. There were no neck masses and the thyroid was not enlarged.  RESPIRATORY: Breathing pattern was normal and the chest moved symmetrically.   Lung sounds were equal " bilaterally.  CARDIOVASCULAR: Heart rate and rhythm were normal.  S1 and S2 were normal and there were no extra sounds or murmurs. Peripheral pulses in arms and legs were normal.  Jugular venous pressure was normal.  There was no peripheral edema.  GASTROINTESTINAL: The abdomen was obese in contour.  Bowel sounds were present.   Palpation detected no tenderness, mass, or enlarged organs.   MUSCULOSKELETAL: Skeletal configuration was normal and muscle mass was normal for age. Joint appearance was overall normal.  LYMPHATIC: There were no enlarged nodes.  SKIN/HAIR/NAILS: Skin color was normal.  There were no abnormal skin lesions.  Hair and nails were normal.  NEUROLOGIC: The patient was alert and oriented to person, place, time, and circumstance. Speech was normal. Cranial nerves were normal. Motor strength was normal for age. The patient was normally coordinated.  PSYCHIATRIC:  Mood and affect were normal and the patient had normal recent and remote memory. The patient's judgment and insight are improved from recent past    EKG some artifact is noted.  Normal sinus rhythm is seen with mild tachycardia.  No acute ST-T wave changes are noted.  An incomplete right bundle branch block is noted  Results for orders placed or performed in visit on 05/12/17   Electrocardiogram Perform and Read   Result Value Ref Range    SYSTOLIC BLOOD PRESSURE  mmHg    DIASTOLIC BLOOD PRESSURE  mmHg    VENTRICULAR RATE 89 BPM    ATRIAL RATE 89 BPM    P-R INTERVAL 168 ms    QRS DURATION 116 ms    Q-T INTERVAL 398 ms    QTC CALCULATION (BEZET) 484 ms    P Axis 63 degrees    R AXIS 76 degrees    T AXIS 43 degrees    MUSE DIAGNOSIS       Normal sinus rhythm  Right bundle branch block  Abnormal ECG  When compared with ECG of 20-DEC-2013 19:59,  No significant change was found         Loida Stockton MD  Internal Medicine  [facility]  Contact me at [phone]

## 2021-06-10 NOTE — PROGRESS NOTES
Dear  Loida Stockton Md  [address] [place] [address]    Thank you for the opportunity to participate in the care of  Randi Damon.    She is a 61 y.o. y/o who comes to the clinic for follow up.    We had our last appointment in December 2014 and at that time, we talked about her RLS and her risk for sleep apnea. We placed an order for a sleep study at that time.  She reports that she was unable to complete the test at that time. She continues to struggle with daytime sleepiness and un-refreshing sleep. She continues to snore at night. The snoring is very loud and happens every night. Since our last appointment, she had several surgical procedures and she was told that her oxygen was low during the procedure.    Regarding her restless legs syndrome, she continues to take Mirapex but she is taking a much lower dose. She uses 0.5 mg or 1 mg and only takes this medication on nights that she needs it (90% of nights).   Side effects:   Sleepiness: yes  Impulse control difficulties: Yes, she reports having a moderate shopping spree. She bought a sewing machine that was not planned.   Melanoma screening: No    Past Medical History  Past Medical History:   Diagnosis Date     Breast cancer 1994     Hx antineoplastic chemotherapy      Hx of radiation therapy         Past Surgical History  Past Surgical History:   Procedure Laterality Date     BREAST BIOPSY       BREAST LUMPECTOMY Left 1994     LA ARTHRODESIS,ANKLE,OPEN      Description: Ankle Arthrodesis;  Recorded: 04/07/2010;     LA MASTECTOMY, MODIFIED RADICAL      Description: Modified Radical Mastectomy Left Breast;  Recorded: 04/07/2010;     LA REMOVE TONSILS/ADENOIDS,<11 Y/O      Description: Tonsillectomy With Adenoidectomy;  Recorded: 04/07/2010;     REDUCTION MAMMAPLASTY Right     approx late [id number]        Meds  Current Outpatient Prescriptions   Medication Sig Dispense Refill     albuterol (PROVENTIL) 2.5 mg /3 mL (0.083 %) nebulizer  solution Take 3 mL (2.5 mg total) by nebulization every 6 (six) hours as needed for wheezing. 75 mL 12     budesonide-formoterol (SYMBICORT) 160-4.5 mcg/actuation inhaler Inhale 2 puffs 2 (two) times a day.       cyanocobalamin, vitamin B-12, 2,500 mcg Subl Place under the tongue 2 (two) times a week.       DULoxetine (CYMBALTA) 30 MG capsule   1     ergocalciferol (VITAMIN D2) 50,000 unit capsule Take 1 capsule (50,000 Units total) by mouth 2 (two) times a week. 8 capsule 12     fluticasone (FLONASE) 50 mcg/actuation nasal spray 2 sprays at night 16 g 12     folic acid (FOLVITE) 1 MG tablet TAKE 1 TABLET (1 MG) BY ORAL ROUTE ONCE DAILY 90 tablet 3     hydrochlorothiazide (MICROZIDE) 12.5 mg capsule TAKE 1 CAPSULE BY MOUTH DAILY. 90 capsule 3     HYDROcodone-acetaminophen (NORCO )  mg per tablet Take 1 tablet by mouth every 6 (six) hours as needed for pain. 112 tablet 0     levothyroxine (SYNTHROID) 150 MCG tablet Alternate one tablet with her other dose of 175 g.  Or take this tablet every other day. (Patient taking differently: Alternate one tablet with her other dose of 175 g.  Take twice weekly) 10 tablet 11     levothyroxine (SYNTHROID, LEVOTHROID) 175 MCG tablet TAKE 1 TABLET BY MOUTH DAILY (Patient taking differently: TAKE 1 TABLET 5 times weekly) 20 tablet 11     LORazepam (ATIVAN) 1 MG tablet as needed.  0     omeprazole (PRILOSEC) 20 MG capsule TAKE ONE CAPSULE BY MOUTH TWO TIMES A  capsule 2     pramipexole (MIRAPEX) 1 MG tablet Take 0.5 - 1 tab daily PRN 90 tablet 3     temazepam (RESTORIL) 15 mg capsule as needed.   1     tiotropium (SPIRIVA) 18 mcg inhalation capsule Place 18 mcg into inhaler and inhale daily.       triamcinolone (KENALOG) 0.1 % ointment Thin layer bid 80 g 0     VOLTAREN 1 % Gel Apply a thin layer twice daily 100 g 2     methocarbamol (ROBAXIN) 500 MG tablet Take 1 tablet (500 mg total) by mouth 4 (four) times a day. 120 tablet 1     No current facility-administered  "medications for this visit.         Allergies  Cephalexin; Levofloxacin; Penicillins; and Sulfa (sulfonamide antibiotics)     Social History  Social History     Social History     Marital status:      Spouse name: N/A     Number of children: N/A     Years of education: N/A     Occupational History     Not on file.     Social History Main Topics     Smoking status: Former Smoker     Smokeless tobacco: Not on file     Alcohol use No     Drug use: No     Sexual activity: Not on file     Other Topics Concern     Not on file     Social History Narrative        Family History  Family History   Problem Relation Age of Onset     Heart disease Father      Breast cancer Neg Hx            Review of Systems:  Constitutional: Negative except as noted in HPI.   Eyes: Negative except as noted in HPI.   ENT: Negative except as noted in HPI.   Cardiovascular: Negative except as noted in HPI.   Respiratory: Negative except as noted in HPI.   Gastrointestinal: Negative except as noted in HPI.   Genitourinary: Negative except as noted in HPI.   Musculoskeletal: Negative except as noted in HPI.   Integumentary: Negative except as noted in HPI.   Neurological: Negative except as noted in HPI.   Psychiatric: Negative except as noted in HPI.   Endocrine: Negative except as noted in HPI.   Hematologic/Lymphatic: Negative except as noted in HPI.      Physical Exam:  /80  Pulse 96  Ht 5' 6\" (1.676 m)  Wt (!) 233 lb 12.8 oz (106.1 kg)  SpO2 94%  BMI 37.74 kg/m2  BMI:Body mass index is 37.74 kg/(m^2).   GEN: NAD, obese  Neurological: Alert, oriented to time, place, and person.  Psych: normal mood, normal affect        Labs/Studies:     Lab Results   Component Value Date    WBC 7.4 04/04/2017    HGB 17.4 (H) 04/04/2017    HCT 50.3 (H) 04/04/2017    MCV 96 04/04/2017     04/04/2017         Chemistry        Component Value Date/Time     04/20/2017 0921    K 4.1 04/20/2017 0921     04/20/2017 0921    CO2 28 " 04/20/2017 0921    BUN 13 04/20/2017 0921    CREATININE 0.65 04/20/2017 0921     04/20/2017 0921        Component Value Date/Time    CALCIUM 9.3 04/20/2017 0921    ALKPHOS 77 02/08/2017 1112    AST 15 02/08/2017 1112    ALT 15 02/08/2017 1112    BILITOT 0.4 02/08/2017 1112            Lab Results   Component Value Date    FERRITIN 67 04/04/2017     Lab Results   Component Value Date    TSH 0.30 04/04/2017         Assessment and Plan:  In summary Randi Damon is a 61 y.o. year old female here for follow up.  1. Snoring/ day time sleepiness/ risk for sleep apnea  Randi Damon has high risk for obstructive sleep apnea based on the results of her STOP BANG questionnaire.  Recommend getting an overnight polysomnography to split per protocol.  The patient should return to the clinic to discuss results and treatment option in a patient-centered approach.    2. Restless legs syndrome/WED  Continue Pramipexole 0.5 to 1 mg per night.  We talked about the fact that she is taking a high dose of this medication.  I am concerned that she is exhibiting two side effects that are significant (sleepiness, and mild impulse control difficulties).  It is OK to continue this medication but she should have more frequent follow ups  I will place a referral for dermatology screening.     Patient verbalized understanding of these issues, agrees with the plan and all questions were answered today. Patient was given an opportuntity to voice any other symptoms or concerns not listed above. Patient did not have any other symptoms or concerns.      RTC after test.      MD PRETTY Stoner Board Certified in Internal Medicine and Sleep Medicine  [facility].

## 2021-06-10 NOTE — PROGRESS NOTES
Mental Health Visit Note    4/26/2017   Start time: 1100    Stop Time: 1150      Randi Damon is a 61 y.o. female is being seen today for    Chief Complaint   Patient presents with     Mental Health Visit   .     New symptoms or complaints: None    Functional Impairment:   Personal: 4  Family: 3  Work: NA  Social:3    Clinical assessment of mental status: Patient presented alert and oriented x3.  She was well-groomed.  Her attire was appropriate.  Patient was cooperative.  Patient motor actively was normal and eye contact appropriate.  Patients mood was anxious and affect congruent.  Patient s speech rapid and language was normal.  Patient attention and thought process normal.  Concentration was brief.  Patient denied delusions or hallucinations. Patient denied suicidal or homicidal ideation.  Patient denied any long or short term memory problems. No evidence of impairment in judgment. She has insight and fund of knowledge was adequate.         Suicidal/Homicidal Ideation present: None Reported This Session    Patient's impression of their current status: Pt reporting multiple psychosocial stressors that are greatly impacting her mental health.     Therapist impression of patients current state: Pt reporting multiple stressors mostly related to her physical health concerns, but that it impacts her mood & ability to function.  Pt reports low energy & motivation, which increases her depression. Pt reports struggles with procrastination and processed possible strategies to cope with it.  She continues to take psychotropic medications, which appear to be helping.     Type of psychotherapeutic technique provided: Insight oriented, Client centered, Solution-focused and CBT    Progress toward short term goals:Progress as expected, self-care, coping, stress management    Review of long term goals: Date of last review 4/19/2017    Diagnosis:   1. Bipolar disorder, current episode mixed, severe, without psychotic  features    2. Severe recurrent major depression without psychotic features    3. Generalized anxiety disorder    4. Chronic pain syndrome        Plan and Follow up: Continue to take medications as prescribed  Attend all medical appointments as scheduled  Follow up with Mabel in 1-2 weeks  Break down tasks into small/managable goals.       Discharge Criteria/Planning: Patient will continue with follow-up until therapy can be discontinued without return of signs and symptoms.    Shanice Merida 5/4/2017      This note was created with help of Dragon dictation software. Grammatical / typing errors are not intentional and inherent to the software.

## 2021-06-10 NOTE — TELEPHONE ENCOUNTER
See patients call reporting last night she took some of her 's gabapentin last night for her restless leg syndrome and felt better.    This afternoon she reports she did get some sleep this afternoon may feel a little bit better.  Still is distressed.  Use some of her 's gabapentin 300 mg at night given helpful.    Has been using the Pristiq 100 mg 1/2 tablet yesterday and I recommended she get taken today.    Reports calling her primary care doctor's office for directed her to both emergency room as she is in the past and had acid-base balance also make her feel uncomfortable.    We discussed the plan to continue to taper her Pristiq in case there is elements of history of serotonin syndrome complicating discontinuation syndrome.  I will add in some gabapentin 300 mg at night she can take for the short-term which has been helpful for restless leg syndrome, nonspecifically for anxiety.   No

## 2021-06-10 NOTE — PROGRESS NOTES
Mental Health Visit Note    4/19/2017   Start time: 79168    Stop Time: 1050   Session # 5    Randi Damon is a 61 y.o. female is being seen today for    Chief Complaint   Patient presents with     Mental Health Visit   .     New symptoms or complaints: None    Functional Impairment:   Personal: 4  Family: 4  Work: NA  Social:4    Clinical assessment of mental status: Patient presented alert and oriented x3.  She was well-groomed.  Her attire was appropriate.  Patient was cooperative.  Patient motor actively was normal and eye contact appropriate.  Patients mood was anxious and affect congruent.  Patient s speech rapid and language was normal.  Patient attention and thought process normal.  Concentration was distracted.  Patient denied delusions or hallucinations. Patient denied suicidal or homicidal ideation.  Patient denied any long or short term memory problems. No evidence of impairment in judgment.   She has insight and fund of knowledge was adequate.        Suicidal/Homicidal Ideation present: None Reported This Session    Patient's impression of their current status: Pt reporting symptoms of low energy, motivation and depression.     Therapist impression of patients current state: Pt reports going back on psychotropic medications, which appears to be leveling off the manic symptoms.  Processed frustration with medical concerns and feels it greatly impacts her level of functioning and mental health symptoms. Pt processed family history of both mental & physical health concerns.  Discussed childhood experiences and relationship with her family.      Type of psychotherapeutic technique provided: Insight oriented, Client centered, Solution-focused and CBT    Progress toward short term goals:Progress as expected, emotional regulation, communication, medication compliance    Review of long term goals: Treatment Plan updated    Diagnosis:   1. Bipolar disorder, current episode mixed, severe, without psychotic  features    2. Severe recurrent major depression without psychotic features    3. Generalized anxiety disorder    4. Chronic pain syndrome        Plan and Follow up: Continue to follow medication as prescribed  Follow up with Psychiatrist with any questions or concerns: Do not stop medication without consulting with your Psychiatrist  Continue to practice stress management & self-care strategies  Follow up with john in 1-2 weeks      Discharge Criteria/Planning: Patient will continue with follow-up until therapy can be discontinued without return of signs and symptoms.    Shanice Merida 4/24/2017      This note was created with help of Dragon dictation software. Grammatical / typing errors are not intentional and inherent to the software.

## 2021-06-10 NOTE — TELEPHONE ENCOUNTER
Spoke with pt who states she was not happy with her virtual visit with Dr. Stroud today. Pt reports doctor didn't really listen to her, stated her problems weren't addressed.    Reviewed visit with pt-pt reiterated problems as discussed in visit, Anxiety, insomnia, Restless body (pt reports her entire body is shaking).      Pt has had recent visit with Dr. Dubois and her [facility]     Pt believes these symptoms she's experiencing are from her tapering of Pristiq, as instructed by Dr. Dubois, see multiple nurse triage messages over the past several days.     Spoke with pt about trouble breathing, as message below indicates pt had difficulty breathing.     Pt reports she has occasional shortness of breath due to her anxiety. Pt really wanted to have an in person appt with a provider who works with Dr. Stockton. Assisted in scheduling pt with Dr. Teran tomorrow.     Reviewed with pt that if she feels she needs to go to the ER tonight d/t difficulty breathing, etc, to go to ER.  Pt understanding and appreciative of call.

## 2021-06-10 NOTE — PROGRESS NOTES
Progress Note    Patient Name: Randi Cleary  Date: 6/10/21         Service Type: Individual      Session Start Time: 9:38 AM  Session End Time: 10:20 AM     Session Length: 42 minutes    Session #: 43    Attendees: Client attended alone    Service Modality:  Video Visit:    Telemedicine Visit: The patient's condition can be safely assessed and treated via synchronous audio and visual telemedicine encounter.      Reason for Telemedicine Visit: Services only offered telehealth    Originating Site (Patient Location): Patient's home    Distant Site (Provider Location): Provider Remote Setting    Consent:  The patient/guardian has verbally consented to: the potential risks and benefits of telemedicine (video visit) versus in person care; bill my insurance or make self-payment for services provided; and responsibility for payment of non-covered services.     Mode of Communication:  Video Conference via Conzoom    As the provider I attest to compliance with applicable laws and regulations related to telemedicine.      Treatment Plan Last Reviewed: today (6/10/21)  PHQ-9 / CHAVO-7 :   PHQ 5/12/2021 5/21/2021 6/10/2021   PHQ-9 Total Score 12 7 10   Q9: Thoughts of better off dead/self-harm past 2 weeks Not at all Not at all Not at all   F/U: Thoughts of suicide or self-harm - - -   F/U: Self harm-plan - - -   F/U: Self-harm action - - -   F/U: Safety concerns - - -   Some encounter information is confidential and restricted. Go to Review Flowsheets activity to see all data.     CHAVO-7 SCORE 5/12/2021 5/21/2021 6/10/2021   Total Score 9 (mild anxiety) 9 (mild anxiety) 11 (moderate anxiety)   Total Score 9 9 11   Some encounter information is confidential and restricted. Go to Review Flowsheets activity to see all data.         DATA  Interactive Complexity: No  Crisis: No       Progress Since Last Session (Related to Symptoms / Goals / Homework):   Symptoms: Some hope despite all  her recent challenges    Homework: Achieved / completed to satisfaction  Work on finding hope, remembering all the positive steps you have taken. -done     Episode of Care Goals: Satisfactory progress - ACTION (Actively working towards change); Intervened by reinforcing change plan / affirming steps taken     Current / Ongoing Stressors and Concerns:   Patient is currently socially isolated. She has a conflictual relationship with her .  She is getting minimal physical activity.  She had recent eye surgery     Treatment Objective(s) Addressed in This Session:   Patient will increase frequency of engaging her in ADLs.  Patient will track and record at least 5 pleasant exchanges with . Patient will be able to identify at least 5 positive traits about her .  Patient will reduce level of depressive and anxious features as evidenced by reduction in score on her CHAVO-7 and PHQ-9 (scores of 15 and 16 at first measurment, respectively).     Intervention:   CBT: Person-Centered Therapy: Patient shared that she has COVID, which led to all her surgeries being cancelled.  Processed this.     ASSESSMENT: Current Emotional / Mental Status (status of significant symptoms):   Risk status (Self / Other harm or suicidal ideation)   Patient denies current fears or concerns for personal safety.   Patient denies current or recent suicidal ideation or behaviors.   Patient denies current or recent homicidal ideation or behaviors.   Patient denies current or recent self injurious behavior or ideation.   Patient denies other safety concerns.   Patient reports there has been no change in risk factors since their last session.     Patient reports there has been no change in protective factors since their last session.     A safety and risk management plan has been developed including: Patient consented to co-developed safety plan.  Safety and risk management plan was completed.  Patient agreed to use safety plan should any  safety concerns arise.  A copy was given to the patient. This was done on 2020 and is attached to the bottom of the note.     Appearance:   Appropriate    Eye Contact:   Fair    Psychomotor Behavior: Normal    Attitude:   Cooperative    Orientation:   All   Speech    Rate / Production: Normal/ Responsive    Volume:  Normal    Mood:    frustrated, yet hopeful   Affect:    Lethargic    Thought Content:  Clear    Thought Form:  Coherent    Insight:    Fair      Medication Review:   No changes to current psychiatric medication(s)     Medication Compliance:   Yes     Changes in Health Issues:   Yes: tested positive for COVID, despite being vaccinated, so all surgeries were cancelled     Chemical Use Review:   Substance Use: Chemical use reviewed, no active concerns identified      Tobacco Use: No current tobacco use.      Diagnosis:  1. Major depressive disorder, recurrent episode, moderate with anxious distress (H)    2. CHAVO (generalized anxiety disorder)      Collateral Reports Completed:   Not Applicable    PLAN: (Patient Tasks / Therapist Tasks / Other)  Take care of your physical health, continue to find hope    MICAELA SLADE    Jenny 10, 2021                                                         ______________________________________________________________________    Treatment Plan    Patient's Name: Randi Cleary  YOB: 1955    Date: 6/10/21    DSM5 Diagnoses: 296.32 (F33.1) Major Depressive Disorder, Recurrent Episode, Moderate With anxious distress  Psychosocial / Contextual Factors: Patient's entire family of origin has , she now has a sister-in-law and  as support.  Relationship with  is conflictual.  Patient is reporting increase in physical symptoms due to COVID-19.  Patient is off of pain medications.  WHODAS: 42    Referral / Collaboration:  Referral to another professional/service is not indicated at this time.     Anticipated number of session or this  "episode of care: 12-15      MeasurableTreatment Goal(s) related to diagnosis / functional impairment(s)  Goal 1: Patient will \"jumpstart, getting going with the things I need to be doing around the house as far as picking up, doing things, trying to do something every day.  Also to lessen the animosity between me and my .\"    I will know I've met my goal when my shoulders are fixed and I can see.      Objective #A (Patient Action)    Patient will increase frequency of engaging her in ADLs.  Status: Continued - Date(s): 6/10/21    Intervention(s)  Therapist will engage patient in CBT, specifically behavioral activation.    Objective #B  Patient will track and record at least 5 pleasant exchanges with . Patient will be able to identify at least 5 positive traits about her  and how he relates to her.  Status: Continued - Date(s): 6/10/21    Intervention(s)  Therapist will teach assertiveness skills and assign homework related to relationship interactions.    Objective #C  Patient will reduce level of depressive and anxious features as evidenced by reduction in score on her CHAVO-7 and PHQ-9 (scores of 15 and 16 at first measurment, respectively).  Status: Continued - Date(s):  6/10/21    Intervention(s)  Therapist will engage patient in person-centered therapy and CBT.    Patient has reviewed and agreed to the above plan.      MICAELA SLADE  Jenny 10, 2021                                                Randi Cleary          SAFETY PLAN:  Step 1: Warning signs / cues (Thoughts, images, mood, situation, behavior) that a crisis may be developing:  ? Thoughts: \"I don't want to continue\" \"I am unwanted\"  ? Images: none  ? Thinking Processes: ruminating  ? Mood: anger  ? Behaviors: isolating/withdrawing , can't stop crying, not taking care of myself and not taking care of my responsibilities  ? Situations: small triggers, such as not being able to find something, or dropping something   Step 2: " "Coping strategies - Things I can do to take my mind off of my problems without contacting another person (relaxation technique, physical activity):  ? Distress Tolerance Strategies:  arts and crafts: drawing, play with my pet , listen to positive and upbeat music: any, change body temperature (ice pack/cold water)  and paced breathing/progressive muscle relaxation  ? Physical Activities: go for a walk, deep breathing and stretching   ? Focus on helpful thoughts:  \"You've been through this before, you can get through it again.\"  Step 3: People and social settings that provide distraction:                 Name: Carmen                            Name: Darien                           Name: Aleida       ? pool, shopping, Carmen's house, [employer]       Step 4: Remind myself of people and things that are important to me and worth living for:  Clifford Little Donna, post-COVID world, options of what could be in your future        Step 5: When I am in crisis, I can ask these people to help me use my safety plan:                 Name: Sidney  Step 6: Making the environment safe:   ? go to sleep/daydream  Step 7: Professionals or agencies I can contact during a crisis:  ? [facility] Daytime Number: [phone]  ? Suicide Prevention Lifeline: 5-818-174-PEXX (6842)  ? Crisis Text Line Service (available 24 hours a day, 7 days a week): Text [place] to 712178    Local Crisis Services: [facility] Crisis: [phone]     Call 911 or go to my nearest emergency department.       I helped develop this safety plan and agree to use it when needed.  I have been given a copy of this plan.       Client signature _________________________________________________________________  Today s date:  11/24/2020  Adapted from Safety Plan Template 2008 Randi Poole and Robby Barba is reprinted with the express permission of the authors.  No portion of the Safety Plan Template may be reproduced without the express, written " permission.  You can contact the authors at [email or web address] or [email or web address].

## 2021-06-11 ASSESSMENT — ANXIETY QUESTIONNAIRES: GAD7 TOTAL SCORE: 11

## 2021-06-11 ASSESSMENT — PATIENT HEALTH QUESTIONNAIRE - PHQ9: SUM OF ALL RESPONSES TO PHQ QUESTIONS 1-9: 10

## 2021-06-11 NOTE — PATIENT INSTRUCTIONS - HE
PLAN:  Discussed you are optimizing your sleep with CPAP machine accessories.    Discussed you are having your urinary metanephrines and catecholamines monitored related to your pheochromocytoma.    You will continue the Butrans patch 10 mcg every week.    You will continue off the BuSpar and Pristiq.    Dr. Dubois to contact your psychotherapist.    Continue good work avoiding gluten in your diet.    Discussed you will be having shoulder surgery in November.    Dr. Dubois to look into vendors for the gene site or pharmacogenetics testing.    Follow-up with Dr. Dubois in 4 to 6 weeks.

## 2021-06-11 NOTE — PROGRESS NOTES
Patient is here for a follow up appointment with Dr. Dubois. Patient is her for focus on mental health. Patient needs refills for Butrans patch. CSA and UDT were completed at today's appointment.

## 2021-06-11 NOTE — TELEPHONE ENCOUNTER
Medication Request  Medication name:   diazePAM (VALIUM) 2 MG tablet    8/31/2020      Sig - Route: Take 2 mg by mouth. - Oral     Class: Historical Med         [facility]  Reason for request: Is out of med.    Okay to leave a detailed message: yes

## 2021-06-11 NOTE — TELEPHONE ENCOUNTER
Please let her know that she has low potassium.  She should take her potasium twice daily.  She should recheck this in a couple of months.  Verify lasix dose.

## 2021-06-11 NOTE — PROGRESS NOTES
Mental Health Visit Note    6/12/2017    Start time: 1000    Stop Time: 1050   Session # 9    Randi Damon is a 61 y.o. female is being seen today for    Chief Complaint   Patient presents with     Mental health Visit   .     New symptoms or complaints: None    Functional Impairment:   Personal: 4  Family: 4  Work: NA  Social:3    Clinical assessment of mental status: Patient presented alert and oriented x3.  She was well-groomed.  Her attire was appropriate.  Patient was cooperative.  Patient motor actively was normal and eye contact appropriate.  Patients mood was anxious and affect congruent.  Patient s speech and language was normal.  Patient attention and thought process normal.  Concentration was appropriate.  Patient denied delusions or hallucinations. Patient denied suicidal or homicidal ideation.  Patient denied any long or short term memory problems. No evidence of impairment in judgment.  She has insight and fund of knowledge was adequate.        Suicidal/Homicidal Ideation present: None Reported This Session    Patient's impression of their current status: Pt reports ongoing symptoms of depression, anxiety & chronic pain.     Therapist impression of patients current state: Pt reports multiple psychosocial stressors impacting both her mood & daily functioning.  Processed financial stressors & impulse control (spending).  Work on some DBT skills (wise mind) to address impulse control & coping strategies.  Pt processed new medical concerns and fears of pain management while she waits for her other surgery.      Type of psychotherapeutic technique provided: Insight oriented, Client centered, Solution-focused and CBT    Progress toward short term goals:Progress as expected, coping skills, DBT skills    Review of long term goals: Date of last review 4/19/2017    Diagnosis:   1. Bipolar disorder, current episode mixed, severe, without psychotic features    2. Generalized anxiety disorder    3. Chronic  pain syndrome        Plan and Follow up: Follow up with Mabel in 1-2 weeks  Follow medical recommendations and attend all medical appointment  Practice self-care & slowing down (setting small attainable goals)  Practice stress management skills as discussed       Discharge Criteria/Planning: Patient will continue with follow-up until therapy can be discontinued without return of signs and symptoms.    Shanice Merida 6/14/2017      This note was created with help of Dragon dictation software. Grammatical / typing errors are not intentional and inherent to the software.

## 2021-06-11 NOTE — TELEPHONE ENCOUNTER
Unable to do blood draw at clinic today, we do not have the required tubes for the tests that were ordered. I directed patient to where the outpatient lab is across the street at [facility]; she wanted to make sure I let you know she will be going there tomorrow due to her  having training later this afternoon for work. I placed the orders as future. Sorry for the inconvenience.

## 2021-06-11 NOTE — PROGRESS NOTES
Assessment/Plan:     Problem List Items Addressed This Visit     Joint Pain, Localized In The Hip    Relevant Medications    buprenorphine (BUTRANS) 10 mcg/hour PTWK patch      Other Visit Diagnoses     Chronic pain syndrome    -  Primary    Relevant Orders    Pain Management Drug Panel, Urine              No follow-ups on file.    Patient Instructions   PLAN:  Discussed you are optimizing your sleep with CPAP machine accessories.    Discussed you are having your urinary metanephrines and catecholamines monitored related to your pheochromocytoma.    You will continue the Butrans patch 10 mcg every week.    You will continue off the BuSpar and Pristiq.    Dr. Dubois to contact your psychotherapist.    Continue good work avoiding gluten in your diet.    Discussed you will be having shoulder surgery in November.    Dr. Dubois to look into vendors for the gene site or pharmacogenetics testing.    Follow-up with Dr. Dubois in 4 to 6 weeks.        Subjective:       65 y.o. female reviewing the record was hospitalized 8/31 at [facility], I was not called.  Appears presenting with concern for serotonin syndrome, noting of Pristiq at [facility].  These medicines were discontinued as was gabapentin, Requip.    She reviews since then has been still up and down.  She becomes teary and describes concern with the relationship with her , whether this is a toxic situation is sure he can recover.    He is going to emergency room 829, feeling unstable with the taper of the Pristiq.  Since that has discontinued the Pristiq and BuSpar.  Off the Requip.  Was placed on the Valium having trouble with sleeping.    Did follow-up with endocrinology, did labs for metanephrines and catecholamines, concern could be false positive as she is having poor sleep.  She resolved to make some changes with the appliances, concerned blowing her eyes getting infections.  She is aware with a surgery for her shoulder schedule 11/6, needs to be stable  "in terms of her metanephrines.  She will be having a 24-hour urine collection prior to that.    Has been using diazepam was 2 mg twice a day prescribed by her psychiatrist, wishes to taper and be off psychotropics.  Reviews she has been in therapy and treatment for some 30 years for depression and substance use.    Has been using medical cannabis, produces a high THC solution which she describes as quite helpful with anxiety but can \"[place].  We discussed using a more balanced THC equal to CBD or higher CBD to see if that is also helpful.    She continues with the Butrans patch 10 mcg weekly.    She has been taking collagen supplementation which may help her shoulder.    She did decrease gluten significantly and has found she has less nausea, does not have restless legs, and her sleep is better we will continue to do so.    She continues working with her psychotherapist Dallas Boyer weekly.  She has shared some of the issues in her relationship.  She notes she herself can be angry and may be causing some agitation but her  response is also concerned.     is reviewed, showing the buprenorphine 10 mcg, the gabapentin which is been discontinued, diazepam for her psychiatrist.      Current Outpatient Medications:      albuterol (PROVENTIL) 2.5 mg /3 mL (0.083 %) nebulizer solution, Take 3 mL (2.5 mg total) by nebulization every 6 (six) hours as needed for wheezing., Disp: 75 mL, Rfl: 12     aspirin 325 MG EC tablet, Take 325 mg by mouth daily as needed for pain., Disp: , Rfl:      buprenorphine (BUTRANS) 10 mcg/hour PTWK patch, Place 1 patch on the skin every 7 days., Disp: 4 patch, Rfl: 0     cholecalciferol, vitamin D3, (VITAMIN D3) 5,000 unit Tab, Take 10,000 Units by mouth daily., Disp: , Rfl:      cyanocobalamin, vitamin B-12, 5,000 mcg Subl, Place 5,000 mcg under the tongue daily., Disp: , Rfl:      diazePAM (VALIUM) 2 MG tablet, Take 2 mg by mouth., Disp: , Rfl:      diclofenac sodium (VOLTAREN) 1 " "% Gel, Apply 2 g topically 4 (four) times a day. To shoulder, Disp: 300 g, Rfl: 3     fluticasone-umeclidinium-vilanterol (TRELEGY ELLIPTA) 100-62.5-25 mcg DsDv inhaler, Inhale 1 Inhalation daily., Disp: 3 each, Rfl: 3     KLOR-CON M20 20 mEq tablet, Take 20 mEq by mouth daily. On 7th day taking only half tab, Disp: , Rfl: 3     levothyroxine (SYNTHROID, LEVOTHROID) 175 MCG tablet, Take 1 tablet daily for 6 days of the week, 7th day PRN (Patient taking differently: 175 mcg daily. ), Disp: 14 tablet, Rfl: 0     losartan (COZAAR) 25 MG tablet, Take 25 mg by mouth daily., Disp: , Rfl:      medical cannabis (Patient's own supply), by Other route see administration instructions. (The purpose of this order is to document that the patient reports taking medical cannabis. This is not a prescription, and is not used to certify that the patient has a  qualifying medical condition.)  THC- Red Tinsure- Green/Yellow/ Red capsules CBD sublingual spray, Disp: , Rfl:      omeprazole (PRILOSEC) 20 MG capsule, TAKE 1 CAPSULE BY MOUTH 2 TIMES A DAY, Disp: 180 capsule, Rfl: 2     furosemide (LASIX) 20 MG tablet, Take 40 mg by mouth daily., Disp: , Rfl: 3     metFORMIN (GLUCOPHAGE-XR) 500 MG 24 hr tablet, Take 500 mg by mouth., Disp: , Rfl:     Current Facility-Administered Medications:      albuterol nebulizer solution 2.5 mg (PROVENTIL), 2.5 mg, Inhalation, Once, Loida Stockton MD     cyanocobalamin injection 1,000 mcg, 1,000 mcg, Intramuscular, Q30 Days, Loida Stockton MD, 1,000 mcg at 05/28/19 0912    She is alert, clear sensorium.  Thought process tight logical.  Ambulates with cane, tearful as noted.               Objective:     Vitals:    09/18/20 1525   BP: 132/58   Pulse: 90   Weight: (!) 258 lb (117 kg)   Height: 5' 6\" (1.676 m)       Assessment: Psoriatic arthritis, to have surgery for her shoulder.  Comorbid psychosocial stressors, mood disorder.  History of pheochromocytoma, and association with sleep " apnea.    Plan as above.    Message left with a psychotherapist.    Total time more than 25 minutes          This note has been dictated using voice recognition software. Any grammatical or context distortions are unintentional and inherent to the software
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
## 2021-06-11 NOTE — PATIENT INSTRUCTIONS - HE
Continue medications as prescribed  Have your pharmacy contact us for a refill if you are running low on medications (We may ask you to come into clinic to get a refill from the nurse  No Alcohol or drug use  No driving if sedated  Call the clinic with any questions or concerns   Reach out for help if you feel like hurting yourself or others ([facility] [phone]: [address] [facility], [address] or [facility] Suicide Hotline [phone] , call 911 or go to nearest Emergency room    Follow up as directed, for your appointments, per your After Visit Summary Form.

## 2021-06-11 NOTE — PROGRESS NOTES
Subjective:   Randi Damon is a 61 y.o. female who presents for evaluation of pain. See rooming evaluation. Patient was last seen 5/17/17.     CC: Pain. Review of current status. Patient general opinion of symptoms management and function is fair    Major issues:  1. Chronic pain syndrome    2. Pain in right foot    3. Sacroiliitis    4. Neck pain    5. Acute pain of right shoulder          Past Medical History:   Diagnosis Date     Breast cancer 1994     Hx antineoplastic chemotherapy      Hx of radiation therapy      Past Surgical History:   Procedure Laterality Date     BREAST BIOPSY       BREAST LUMPECTOMY Left 1994     CO ARTHRODESIS,ANKLE,OPEN      Description: Ankle Arthrodesis;  Recorded: 04/07/2010;     CO MASTECTOMY, MODIFIED RADICAL      Description: Modified Radical Mastectomy Left Breast;  Recorded: 04/07/2010;     CO REMOVE TONSILS/ADENOIDS,<11 Y/O      Description: Tonsillectomy With Adenoidectomy;  Recorded: 04/07/2010;     REDUCTION MAMMAPLASTY Right     approx late [id number]         HPI:   Impact of pain treatments:   Analgesia: fair  ADL's: Social: Travel to [place]  In November  Aberrant behavior: none    Aggravating factors: nights are the worse time  Alleviating factors: medication aids some  Associated symptoms: She is sleeping about 5-6 hours  DME: CPAP (she will turn it off in the middle of the night and does not recall)  Location/Laterality of the pain: right shoulder, neck  Timing: constant  Quality: throbbing   Severity:5/10    Activities Impaired by Increasing Pain Severity: F= 4  3-Enjoy  4-Work, Enjoy  5-Active, Mood Work Enjoy  6-Sleep, Active, Mood Work Enjoy  7-Walk, Sleep, Active, Mood Work Enjoy  8-Relate, Walk, Sleep, Active, Mood Work Enjoy      Medication:   Last opioid dose was Hydrocodone 10/325 mg taken on 06/13/17 @ 1230p.    Current Outpatient Prescriptions:      albuterol (PROVENTIL) 2.5 mg /3 mL (0.083 %) nebulizer solution, Take 3 mL (2.5 mg total) by  nebulization every 6 (six) hours as needed for wheezing., Disp: 75 mL, Rfl: 12     budesonide-formoterol (SYMBICORT) 160-4.5 mcg/actuation inhaler, Inhale 2 puffs 2 (two) times a day., Disp: , Rfl:      cyanocobalamin, vitamin B-12, 2,500 mcg Subl, Place under the tongue 2 (two) times a week., Disp: , Rfl:      DULoxetine (CYMBALTA) 30 MG capsule, , Disp: , Rfl: 1     fluticasone (FLONASE) 50 mcg/actuation nasal spray, 2 sprays at night, Disp: 16 g, Rfl: 12     folic acid (FOLVITE) 1 MG tablet, TAKE 1 TABLET (1 MG) BY ORAL ROUTE ONCE DAILY, Disp: 90 tablet, Rfl: 3     HYDROcodone-acetaminophen (NORCO )  mg per tablet, Take 1 tablet by mouth every 6 (six) hours as needed for pain., Disp: 112 tablet, Rfl: 0     levothyroxine (SYNTHROID) 150 MCG tablet, Alternate one tablet with her other dose of 175 g.  Or take this tablet every other day., Disp: 15 tablet, Rfl: 11     levothyroxine (SYNTHROID, LEVOTHROID) 175 MCG tablet, Every other day, Disp: 20 tablet, Rfl: 11     LORazepam (ATIVAN) 1 MG tablet, as needed., Disp: , Rfl: 0     omeprazole (PRILOSEC) 20 MG capsule, TAKE ONE CAPSULE BY MOUTH TWO TIMES A DAY, Disp: 180 capsule, Rfl: 2     pramipexole (MIRAPEX) 1 MG tablet, Take 0.5 - 1 tab daily PRN, Disp: 90 tablet, Rfl: 3     prazosin (MINIPRESS) 1 MG capsule, Take 1 capsule (1 mg total) by mouth at bedtime. (Patient taking differently: Take 2 mg by mouth at bedtime. ), Disp: , Rfl: 0     temazepam (RESTORIL) 15 mg capsule, as needed. , Disp: , Rfl: 1     tiotropium (SPIRIVA) 18 mcg inhalation capsule, Place 18 mcg into inhaler and inhale daily., Disp: , Rfl:      triamcinolone (KENALOG) 0.1 % ointment, Thin layer bid, Disp: 80 g, Rfl: 0     VITAMIN D2 50,000 unit capsule, TAKE 1 CAPSULE (50,000 UNITS TOTAL) BY MOUTH 2 (TWO) TIMES A WEEK., Disp: 8 capsule, Rfl: 11     VOLTAREN 1 % Gel, Apply a thin layer twice daily, Disp: 100 g, Rfl: 2    Mental Health: She is working with mental health    Additional  "Problems:   Endocrinology: Dr. Camacho - Adrenal adenoma - possible Pheochromocytoma - possible surgery w/ Dr. Linares. She is being managed with an alpha blocker and she is being started on a beta blocker    Review of Systems   Constitutional- improved sleep disturbances  Musculoskeletal- + pain  Neuro- - cognitive changes  Endocrine: sweats, adrenal symptoms  Psych-  + mood concerns,  - taking medication in a fashion other than prescribed    Social  Family History   Problem Relation Age of Onset     Heart disease Father      Breast cancer Neg Hx      History   Smoking Status     Former Smoker   Smokeless Tobacco     Not on file     History   Alcohol Use No     History   Drug Use No     History   Sexual Activity     Sexual activity: Not on file       Objective:     Vitals:    06/13/17 1435   BP: 125/82   Pulse: 96   Resp: 20   Weight: (!) 229 lb (103.9 kg)   Height: 5' 6\" (1.676 m)   PainSc:   5       Constitutional:  Pleasant and cooperative female who presents alone today.   Psychiatric: Mood and affect are appropriate for the situation, setting and topic of discussion.  Patient does not appear sedated.  Integumentary:  Observed skin WNL  HEENT: EOM's grossly intact.    Chest: Breathing is non-labored.   Neurological:  Alert and oriented in all spheres including: time, place, person and situation.    Diagnostics:   Lab:  Last UDT/SWAB on 11/10/16    : Reviewed 6/12/17    Imaging:  No new imaging available to review    Assessment:   Randi Damon is a 61 y.o. female seen in clinic today for chronic pain. She has been struggling with symptoms from an adrenal tumor and surgery to address her symptoms of her right shoulder have been placed on hold while it is being addressed. I am adding in MS Contin at bedtime to help with the pain at night. She is anticipating surgery for the adrenal adenoma. At this time the location of the surgery is undetermined. She does have an appt with Dr. Linares. Once the surgery is " set w/ a date, location and surgeon then I will forward a letter offering insight from the pain clinic r/t medication management    *Universal Precautions:    UDS/Swab-SWAB 11/10/16.   Agreement- 5/17/17  Pharmacy- as documented    - 6/12/17  Count- n/a   Psychological evaluation 10/23/14  6/13/17 MME- 55  Pharmacogenetic testing- n/a  MTM: n/a    The patient has chronic pain and is being  initiated  on a ER/LA opioid for pain symptoms severe enough to warrant around the clock care with an opioid medication.    Management of opioid medication is inherently a moderate to high complex medial interaction based on the risk management required at each contact r/t risks and side effects.    Plan:   Plan/NextSteps:     Follow up: 1 months    Education:   Please note there are 2 Anne-Marie's at the [facility]. Today you saw Anne-Marie Cheunggins when communicating any needs please specify the last name. Thank you    Please call Monday-Friday for problems or questions and one of the clinical support staff (CSS) will help to get things figured out. The number is [phone]. Some folks are using [employer] to send an e-mail ([employer] message). Please remember some issues require an office visit.     Today we reviewed the plan of care and answered questions.      Health Maintenance: Please continue to see primary care for general medical prevention and illness care.    Mental Health: continue with your mental health folks    Interventional: I understand you are considering surgery for the adrenal issues you will keep me posted on when surgery is scheduled and with whom/    Records: Reviewed to assist with preparation for the office visit and are reflected throughout the note.    Medication:   Medication prescribed today    Requested Prescriptions     Signed Prescriptions Disp Refills     HYDROcodone-acetaminophen (NORCO )  mg per tablet 112 tablet 0     Sig: Take 1 tablet by mouth every 6 (six) hours as needed  for pain.     morphine (MS CONTIN) 15 MG 12 hr tablet 30 tablet 0     Sig: Take 1 tablet (15 mg total) by mouth at bedtime.       REFILL INSTRUCTIONS:  Please contact the clinic refill line 7 days before your refill is due. Speak clearly; note cell phones cut in-and-out and poor quality speech and reception issues will influence our ability to hear you and be efficient with your prescription.     Call [phone] leave:   Your name (first and last w/ spelling)   Date of birth  Name of all the medication(s) being requested  Dose of the medication(s)   How you are taking the medication (eg. twice per day etc).     Contact your pharmacy 3 (three) days after leaving your message to see if your prescription has been received. Please request the pharmacy check your profile to be certain about any concerns with a script failing to be received. Note: Chas updates have been inconsistent.  If the script has not been received there may have been a problem with the communication please reach back out to the clinic.       SAFETY REMINDERS  No alcohol while taking controlled substances. Alcohol is not an illegal substance, it is unsafe to use in combination. It is a build up of substances in the body that can be extremely hazardous and may cause respirations to slow to a dangerous rate resulting in hospitalization, brain damage, or death.    Unintentional overdose and death.    People are not always in perfect health. Sometimes the body is weak or compromised because of an illness like the flu, pneumonia, low bood etc. When the body struggles, even though a person is taking their medication as prescribed, the medication may be too much for the body to handle. Combinations of medication can also put a person at high risk. In one study it was noted that 80% of unintentional overdoses occurred in people who were taking a combination of opioids and benzodiazepines.    A big concern would be if someone else got your medication. Your  medication is not prescribed to anyone other than you. Your medication could cause harm or death to someone else.    Naloxone is a rescue medication. A person may need the rescue medication if experiencing an unexpected overdose. The medication is meant to give a person a break by lifting off one burden, the opioid narcotic medication. Medical care is required because the a persons body is compromised and needs further testing and assistance.    Symptoms of overdose include:   !breathing slow and shallow, erratic or not at all  !pinpoint pupils, hallucinations  !confusion  !muscle jerks, slack muscles   !extreme sleepiness or loss of alertness   !awake but not able to talk   !face pale or clammy, vomiting, for lighter skinned people, the skin tone turns bluish purple, for darker skinned people, it turns grayish or ashen   If in a situation where overdose is a concern engage the emergency response system (dial 911).    Do not sell, loan, borrow or share your opioid medication with anyone. Deaths have occurred as a result of this practice. It is illegal and patients are being prosecuted.     Prevent unexpected access/loss of medication: Keep medication locked. Only carry what you need with you.      Patient Arrived @ 1430 for a 1420 appointment.     TT: 1445 - 150  CT: over half spent in education and counseling as outlined in the plan.      Consuelo Little APRN [facility]  [address] [address]   [phone]  [phone]

## 2021-06-11 NOTE — TELEPHONE ENCOUNTER
Who is calling:  The patient   Reason for Call:  The patient states she is waiting to get a refill of the valium. The patient states the Dr who originally prescribed the valium did refill the prescription after her discharge from the hospital. The patient is yelling and crying on the phone.  Date of last appointment with primary care:   Okay to leave a detailed message: Yes

## 2021-06-11 NOTE — PROGRESS NOTES
"Mental Health Visit Note    5/31/20172    Start time: 1400    Stop Time: 1450   Session # 7    Randi Damon is a 61 y.o. female is being seen today for    Chief Complaint   Patient presents with     Mental Health Visit   .     New symptoms or complaints: None    Functional Impairment:   Personal: 4  Family: 3  Work: NA  Social:4    Clinical assessment of mental status: Patient presented alert and oriented x3.  She was well-groomed.  Her attire was appropriate.  Patient was cooperative.  Patient motor actively was normal and eye contact appropriate.  Patients mood was anxious and affect congruent.  Patient s speech and language was normal.  Patient attention and thought process normal.  Concentration was appropriate.  Patient denied delusions or hallucinations. Patient denied suicidal or homicidal ideation.  Patient denied any long or short term memory problems. No evidence of impairment in judgment.  She has insight and fund of knowledge was adequate.        Suicidal/Homicidal Ideation present: None Reported This Session    Patient's impression of their current status: Pt reports multiple psychosocial stressors impacting both her physical and mental health.     Therapist impression of patients current state: Processed current struggles within her relationship that revolve around communication.  Processed the long-hx of poor communication within her marriage.  Pt is pending surgery, which she is anxious to get her list of \"to do\" that is causing a lot of additional stress.  Discussed her priority list and breaking it down into attainable goals.      Type of psychotherapeutic technique provided: Insight oriented, Client centered, Solution-focused and CBT    Progress toward short term goals:Progress as expected, self-care, communication, goal setting    Review of long term goals: Date of last review 4/19/2017    Diagnosis:   1. Bipolar disorder, current episode mixed, severe, without psychotic features    2. " Generalized anxiety disorder    3. Chronic pain syndrome        Plan and Follow up: Practice self-care strategies  Practice setting smaller- daily goals (time limited or task limited)  Follow up with Mabel in 2 weeks (depending on surgery)      Discharge Criteria/Planning: Patient will continue with follow-up until therapy can be discontinued without return of signs and symptoms.    Shanice Merida 6/2/2017      This note was created with help of Dragon dictation software. Grammatical / typing errors are not intentional and inherent to the software.

## 2021-06-11 NOTE — PROGRESS NOTES
[facility] Follow Up Note    Assessment/Plan:  1. Hospital discharge follow-up  Psychiatric hospitalization for presumed serotonin syndrome.  Unfortunately, I do not have a discharge summary today.  [facility].  She is following with psychiatry and the pain clinic-both psychiatrist for help with this.  She is still on Pristiq.  She has discontinued her restless leg syndrome medications as well as gabapentin.  The med list is updated and reconciled today.  Slept well last night.    2. Severe episode of recurrent major depressive disorder, without psychotic features (H)  Major depression with severe anxiety/agitation and restless legs.  She remains on Pristiq.  She feels that it makes her symptoms worse.  She would like a second opinion.  She has been working with her psychiatrist for over 30 years, however.  Her chart is reviewed and she has 8 physicians on her case.  She had issues with polypharmacy.  Recommendation: Would like her to see an [facility] pharmacist to go over each of her medications/potential side effects/etc. I am wondering if genetic testing for med response would be in order  - Ambulatory referral to Medication Management  - Ambulatory referral to Psychiatry    3. Restless leg syndrome  Neeraj off medications.  Ferritin is low but she has hemochromatosis.  Recommendation: She may use iron rich foods.  She is using Valium at bedtime.  This is somewhat helpful.  - Ambulatory referral to Medication Management  - Ambulatory referral to Psychiatry  - Metanephrines, Fractionated, Free, Plasma    4. Pulmonary hypertension (H)  Managed by [facility] cardiology.  She is on losartan and diuretic.  We will continue to monitor blood pressures    5. Postablative hypothyroidism  Last TSH was 9.0-but she had been on an incorrect dosage of levothyroxine while hospitalized.  Recommendation: We will let it ride and recheck labs in a couple of months    6. Impaired fasting glucose  On  metformin.  With GI disturbance  Recommendation: We will cut dose in half-if no improvement of diarrhea, will hold.  Denies any recent antibiotic use    7. Hereditary hemochromatosis (H)  Followed by hematology    8. Chronic intractable pain  Followed by pain clinic  - Ambulatory referral to Medication Management    9. Screen for colon cancer  Colonoscopy March 2017    10. Visit for screening mammogram  Ordered  - Mammo Screening Bilateral; Future    11. Serotonin withdrawal syndrome, subsequent encounter  Possibly- on multiple medications including Pristiq, Mirapex, gabapentin, etc.  - Ambulatory referral to Psychiatry    12. Pheochromocytoma, unspecified laterality  History of pheochromocytoma-manifested with some behavioral agitation.  Status post resection.  Will recheck labs  - Metanephrines, Fractionated, Free, Plasma  - Catecholamine Fractionation, Free, Plasma    13. Functional diarrhea  Reduce metformin to half dose.  If no improvement, may hold.  Discontinue all supplements.    14. High risk medication use    - Comprehensive Metabolic Panel      11 3 to 4 weeks    Loida Stockton MD    Chief Complaint:  Chief Complaint   Patient presents with     Hospital Visit Follow Up     [facility], discharged 8/31/20, medication reaction     Mammogram     patient due, orders pending     Colon Cancer Screening     patient due, orders pending       History of Present Illness:  Randi is a 65 y.o. female who is here today accompanied by her  him for hospital discharge follow-up.  Of note, I have not seen her in some time.  She has been working with a multitude of physicians including psychiatry, pain clinic, hematology, endocrinology, pulmonology, cardiology.  She has been on multiple medications-many have been discontinued.  She developed extreme restlessness and irritability.  She was admitted for the possibility of serotonin syndrome.  Her medications were adjusted and she was discharged from the  [facility].  She has been working with her psychiatrist who has kept her on Pristiq.  She states she has felt restless.  She does report that since cessation of some of the meds, she finally has been able to sleep.  She states she slept 8 hours last night.  She has been home from the hospital since Monday.    Of note, her history is very garbled and difficult to understand.  She perseverates on many different issues.  Her  is here to offer some clarification.  He describes significant irritability.  Today, she is very pleasant.    She has been diagnosed with primary pulmonary hypertension.  Her hemochromatosis has been managed by her hematologist.  Notes from those physicians are reviewed.  She is managed with her pulmonary hypertension with losartan and diuretics.  An endocrinologist has been adjusting her thyroid medication.  This note is reviewed as well.    She gets her COPD inhalers from a pulmonologist.    Review of Systems:  A comprehensive review of systems was performed and was otherwise negative    PFSH:  Social History: She is .  She is a non-smoker.  She lives with her .  She has recently adopted a cat  Social History     Tobacco Use   Smoking Status Former Smoker     Last attempt to quit: 2003     Years since quittin.1   Smokeless Tobacco Never Used       Past History:   Current Outpatient Medications   Medication Sig Dispense Refill     albuterol (PROVENTIL) 2.5 mg /3 mL (0.083 %) nebulizer solution Take 3 mL (2.5 mg total) by nebulization every 6 (six) hours as needed for wheezing. 75 mL 12     aspirin 325 MG EC tablet Take 325 mg by mouth daily as needed for pain.       buprenorphine (BUTRANS) 10 mcg/hour PTWK patch Place 1 patch on the skin every 7 days. 4 patch 0     cholecalciferol, vitamin D3, (VITAMIN D3) 5,000 unit Tab Take 10,000 Units by mouth daily.       cyanocobalamin, vitamin B-12, 5,000 mcg Subl Place 5,000 mcg under the tongue daily.       diazePAM (VALIUM) 2  MG tablet Take 2 mg by mouth.       fluticasone-umeclidinium-vilanterol (TRELEGY ELLIPTA) 100-62.5-25 mcg DsDv inhaler Inhale 1 Inhalation daily. 3 each 3     furosemide (LASIX) 20 MG tablet Take 40 mg by mouth daily.  3     KLOR-CON M20 20 mEq tablet Take 20 mEq by mouth daily. On 7th day taking only half tab  3     levothyroxine (SYNTHROID, LEVOTHROID) 175 MCG tablet Take 1 tablet daily for 6 days of the week, 7th day PRN (Patient taking differently: 175 mcg daily. ) 14 tablet 0     losartan (COZAAR) 25 MG tablet Take 25 mg by mouth daily.       medical cannabis (Patient's own supply) by Other route see administration instructions. (The purpose of this order is to document that the patient reports taking medical cannabis. This is not a prescription, and is not used to certify that the patient has a  qualifying medical condition.)   THC- Red Tinsure-  Green/Yellow/ Red capsules  CBD sublingual spray       metFORMIN (GLUCOPHAGE-XR) 500 MG 24 hr tablet Take 500 mg by mouth.       omeprazole (PRILOSEC) 20 MG capsule TAKE 1 CAPSULE BY MOUTH 2 TIMES A  capsule 2     desvenlafaxine succinate (PRISTIQ) 100 MG 24 hr tablet Take 100 mg by mouth daily.       diclofenac sodium (VOLTAREN) 1 % Gel Apply 2 g topically 4 (four) times a day. To shoulder 300 g 3     Current Facility-Administered Medications   Medication Dose Route Frequency Provider Last Rate Last Dose     albuterol nebulizer solution 2.5 mg (PROVENTIL)  2.5 mg Inhalation Once Loida Stockton MD         cyanocobalamin injection 1,000 mcg  1,000 mcg Intramuscular Q30 Days Loida Stockton MD   1,000 mcg at 05/28/19 0912       Family History:     Physical Exam:  General Appearance:   She appears at baseline.  She is with significant perseveration-which is often the case.  It is hard to track her history.  Her  month some information  Vitals:    09/04/20 1224   BP: 132/76   Patient Site: Left Arm   Patient Position: Sitting   Cuff Size: Adult  "Regular   Pulse: 90   SpO2: 94%   Weight: (!) 261 lb (118.4 kg)   Height: 5' 6\" (1.676 m)     Wt Readings from Last 3 Encounters:   09/04/20 (!) 261 lb (118.4 kg)   02/20/20 (!) 272 lb 3.2 oz (123.5 kg)   02/11/20 (!) 276 lb 8 oz (125.4 kg)     Body mass index is 42.13 kg/m .    EYES: Eyelids, conjunctiva, and sclera were normal. Pupils were normal. Cornea, iris, and lens were normal bilaterally.  HEAD, EARS, NOSE, MOUTH, AND THROAT: Head and face were normal. Hearing was normal to voice and the ears were normal to external exam. Nose appearance was normal and there was no discharge. Oropharynx was normal.  TMs were normal.  NECK: Neck appearance was normal. There were no neck masses and the thyroid was not enlarged.  RESPIRATORY: Breathing pattern was normal and the chest moved symmetrically.   Lung sounds were equal bilaterally.  CARDIOVASCULAR: Heart rate and rhythm were normal.  S1 and S2 were normal and there were no extra sounds or murmurs. Peripheral pulses in arms and legs were normal.  Jugular venous pressure was normal.  There was no peripheral edema.  GASTROINTESTINAL: The abdomen was normal in contour.  Bowel sounds were present.   Palpation detected no tenderness, mass, or enlarged organs.   MUSCULOSKELETAL: Skeletal configuration was normal and muscle mass was normal for age. Joint appearance was overall normal.  LYMPHATIC: There were no enlarged nodes.  SKIN/HAIR/NAILS: Skin color was normal.  There were no abnormal skin lesions.  Hair and nails were normal.  NEUROLOGIC: The patient was alert and oriented to person, place, time, and circumstance. Speech was normal. Cranial nerves were normal. Motor strength was normal for age. The patient was normally coordinated.  PSYCHIATRIC:  Mood and affect were normal and the patient had normal recent and remote memory. The patient's judgment and insight were normal.          Data Review:    Analysis and Summary of Old Records (2): Analysis of care everywhere " records from cardiology/pulmonology psychiatry    Records Requested (1):       Other History Summarized (from other people in the room) (2):  Sidney contributes    Radiology Tests Summarized (XRAY/CT/MRI/DXA) (1):     Labs Reviewed (1): Ordered and reviewed    Medicine Tests Reviewed (EKG/ECHO/COLONOSCOPY/EGD) (1): Reviewed colonoscopy with her as she is insistent she needs 1.  Printed colonoscopy from [facility]    Independent Review of EKG or X-RAY (2):

## 2021-06-11 NOTE — PROGRESS NOTES
[facility] Follow Up Note    Assessment/Plan:  1. Pheochromocytoma  Finding of an adrenal adenoma that has increased in size.  New symptoms of flushing and mood lability.  No real history of hypertension but intermittently tachycardic.  Increased metanephrine-reproduced ×2.  Recommendation: Complete consultation with endocrinology.  She is working with Dr. Quiñones.  She is initiating first alpha blockade followed by beta-blockade.  - Ambulatory referral to General Surgery-specifically Dr. Linares.  Postpone rotator cuff surgery until after her trip in November.    Lengthy time spent with patient and her  today discussing implications of pheochromocytoma, anesthesia and surgery.  Feel that it would be best to focus on pheochromocytoma.  She will have this resected.  This will allow her to travel to [place] with her family over Thanksgiving.  Upon return from that trip, we will focus on rotator cuff.  She and her  are both in agreement.  Feels that her mood lability, poor choices etc. will be improved after the surgery.  Hopefully that will be the case as there are some mental health issues here as well.    Time spent today about 40 minutes with more than half that time spent in counseling and discussion of surgical choices      Loida Stockton MD    Chief Complaint:  Chief Complaint   Patient presents with     Pre-op Exam     Discuss with MD       History of Present Illness:  Randi is a 61 y.o. female who is here today accompanied by her .  Initially, this was to be a preoperative evaluation prior to rotator cuff surgery.  Of note her surgery was postponed one time as she was diagnosed with severe sleep apnea.  She had not yet had a sleep device or CPAP equipment set up.  Therefore it was decided that it would be best to postpone the rotator cuff surgery until that problem is resolved.  Also, recently, and adrenal adenoma was noted that has recently increased in size.  Also,  she has been having symptoms of mood lability and night sweats.  The CT scan was originally ordered for evaluation of night sweats.  That is when this tumor was identified.  A workup was done here in the office that showed elevated urinary metanephrines.  Serum and urine catecholamines were within normal limits.  She was then referred to Dr. Angela Camacho from endocrinology for further evaluation.  She concurred that this could possibly be a pheochromocytoma.  Therefore, Winnie is being started on alpha blockers to be followed by beta-blockers in anticipation of the surgery for resection of the adrenal gland.    Additionally, her  reports that she continues to have mood lability.  He states she becomes anxious and stressed over very minor things.  He sees her making impulsive and poor choices.  He is hoping that surgery on the adrenal gland will help improve her behavior as well.    When discussing her situation, it seems that she is anxious to have the rotator cuff repaired because she does not want to deal with the rehab in the winter.  She does feel that the pain is something that she can tolerate and control.    Review of Systems:  A comprehensive review of systems was performed and was otherwise negative    PFSH:  Social History: She is .  She has no children.  History   Smoking Status     Former Smoker   Smokeless Tobacco     Not on file       Past History: Significant for multiple medical problems including mental health issues, remote history of breast cancer, hemochromatosis, hypothyroidism, multiple musculoskeletal issues  Current Outpatient Prescriptions   Medication Sig Dispense Refill     albuterol (PROVENTIL) 2.5 mg /3 mL (0.083 %) nebulizer solution Take 3 mL (2.5 mg total) by nebulization every 6 (six) hours as needed for wheezing. 75 mL 12     budesonide-formoterol (SYMBICORT) 160-4.5 mcg/actuation inhaler Inhale 2 puffs 2 (two) times a day.       cyanocobalamin, vitamin B-12, 2,500  mcg Subl Place under the tongue 2 (two) times a week.       DULoxetine (CYMBALTA) 30 MG capsule   1     fluticasone (FLONASE) 50 mcg/actuation nasal spray 2 sprays at night 16 g 12     folic acid (FOLVITE) 1 MG tablet TAKE 1 TABLET (1 MG) BY ORAL ROUTE ONCE DAILY 90 tablet 3     hydrochlorothiazide (MICROZIDE) 12.5 mg capsule TAKE 1 CAPSULE BY MOUTH DAILY. 90 capsule 3     HYDROcodone-acetaminophen (NORCO )  mg per tablet Take 1 tablet by mouth every 6 (six) hours as needed for pain. 112 tablet 0     levothyroxine (SYNTHROID) 150 MCG tablet Alternate one tablet with her other dose of 175 g.  Or take this tablet every other day. 15 tablet 11     levothyroxine (SYNTHROID, LEVOTHROID) 175 MCG tablet Every other day 20 tablet 11     LORazepam (ATIVAN) 1 MG tablet as needed.  0     omeprazole (PRILOSEC) 20 MG capsule TAKE ONE CAPSULE BY MOUTH TWO TIMES A  capsule 2     pramipexole (MIRAPEX) 1 MG tablet Take 0.5 - 1 tab daily PRN 90 tablet 3     temazepam (RESTORIL) 15 mg capsule as needed.   1     tiotropium (SPIRIVA) 18 mcg inhalation capsule Place 18 mcg into inhaler and inhale daily.       triamcinolone (KENALOG) 0.1 % ointment Thin layer bid 80 g 0     VITAMIN D2 50,000 unit capsule TAKE 1 CAPSULE (50,000 UNITS TOTAL) BY MOUTH 2 (TWO) TIMES A WEEK. 8 capsule 11     VOLTAREN 1 % Gel Apply a thin layer twice daily 100 g 2     methocarbamol (ROBAXIN) 500 MG tablet Take 1 tablet (500 mg total) by mouth 4 (four) times a day. 120 tablet 1     prazosin (MINIPRESS) 1 MG capsule Take 1 capsule (1 mg total) by mouth at bedtime.  0     No current facility-administered medications for this visit.        Family History: Significant for hemochromatosis, alcoholism, endocrinopathies    Physical Exam:  General Appearance:   Appears at baseline.  Her blood pressure is as noted but she is tachycardic  Vitals:    06/02/17 1309   BP: 136/82   Patient Site: Left Arm   Patient Position: Sitting   Cuff Size: Adult Large    Pulse: (!) 102   Weight: (!) 229 lb (103.9 kg)     Wt Readings from Last 3 Encounters:   06/02/17 (!) 229 lb (103.9 kg)   05/19/17 (!) 228 lb (103.4 kg)   05/17/17 (!) 228 lb (103.4 kg)     Body mass index is 36.96 kg/(m^2).        Data Review:    Analysis and Summary of Old Records (2): Discussion of patient's case with Dr. Angela Camacho today prior to this visit    Records Requested (1): 0      Other History Summarized (from other people in the room) (2): 0    Radiology Tests Summarized (XRAY/CT/MRI/DXA) (1): 0    Labs Reviewed (1): 0    Medicine Tests Reviewed (EKG/ECHO/COLONOSCOPY/EGD) (1): 0    Independent Review of EKG or X-RAY (2): 0    Time spent about 40 minutes
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
## 2021-06-11 NOTE — PROGRESS NOTES
Mental Health Visit Note    6/6/2017    Start time: 900    Stop Time: 950   Session # 8    Randi Damon is a 61 y.o. female is being seen today for    Chief Complaint   Patient presents with     Mental Health Visit   .     New symptoms or complaints: None    Functional Impairment:   Personal: 3  Family: 3  Work: 4  Social:3    Clinical assessment of mental status: Patient presented alert and oriented x3.  She was well-groomed.  Her attire was appropriate.  Patient was cooperative.  Patient motor actively was normal and eye contact appropriate.  Patients mood was anxious and affect congruent.  Patient s speech and language was normal.  Patient attention and thought process normal.  Concentration was appropriate.  Patient denied delusions or hallucinations. Patient denied suicidal or homicidal ideation.  Patient denied any long or short term memory problems. No evidence of impairment in judgment.    She has insight and fund of knowledge was adequate.        Suicidal/Homicidal Ideation present: None Reported This Session    Patient's impression of their current status: Pt reporting anxiety related to new medical concerns and changes in her medical plan.     Therapist impression of patients current state: Pt discussed recent changes in her upcoming surgery and new diagnosis that requires an additional surgery.  Pt discussed her concerns about the changes & new diagnosis.  She has anxiety, but feels some sense of relief that she has an explanation for some of her symptoms.  She reports it has been hard to shift her thinking and putting off her other surgery until the end of the year.  Processed her concerns about changes in her mood, with hopes her anger and mood swings will improve.  Pt reports she has been using her CPAP machine and is up to 6 hours per night.  States she does notice a difference in mood with additional sleep.      Type of psychotherapeutic technique provided: Insight oriented, Client centered,  Solution-focused and CBT    Progress toward short term goals:Progress as expected, coping skills, problem-solving strategies, cognitive restructuring    Review of long term goals: Date of last review 4/19/2017    Diagnosis:   1. Bipolar disorder, current episode mixed, severe, without psychotic features    2. Generalized anxiety disorder    3. Chronic pain syndrome        Plan and Follow up: Follow up with Mabel in 1-2 weeks  Follow up with all medical appointments as schedule  Reminder to focus on daily goals (small & attainable)  Try not to over research new diagnosis prior to meeting with Provider.       Discharge Criteria/Planning: Patient will continue with follow-up until therapy can be discontinued without return of signs and symptoms.    Shanice Merida 6/6/2017      This note was created with help of Dragon dictation software. Grammatical / typing errors are not intentional and inherent to the software.

## 2021-06-11 NOTE — PROGRESS NOTES
[facility] Follow Up Note    Assessment/Plan:  1. Adrenal mass-possible benign pheochromocytoma  Lengthy discussion with Winnie today regarding the possibility that the adrenal mass is not a pheochromocytoma based on MRI characteristics.  The radiologist is suggesting a possible repeat nuclear scan.  She will be seeing her endocrinologist tomorrow to discuss this.  Recommendation: Continue to consult with both Dr. Salinas and Vijay regarding the adrenal mass.  Given that his has increased in size, would favor excision if deemed appropriate.  As I have told Winnie, her tests were not definitive.  Her history has been negative for hypertension or tachycardia.  Many of her irritability and rage problems may be of a psychiatric nature.  Nonetheless, she has had elevated metanephrines.  This was reproduced ×2.  Currently she has on prazosin and metoprolol in anticipation of the surgery.  Additional recommendation: We will increase metoprolol to 37.5 mg as her heart rate is elevated today    2. Hypokalemia  Did not tolerate prescription potassium.  Will update BMP as she is now taking over-the-counter potassium  - Basic Metabolic Panel    3. Incontinence  Intermittent incontinence.  Will refer to urology when adrenal gland situation is complete  - Ambulatory referral to Urology      See her in 2 weeks for scheduled preop    Loida Stockton MD    Chief Complaint:  Chief Complaint   Patient presents with     Discuss Surgery     and Other Concerns        History of Present Illness:  Randi is a 61 y.o. female with a very complex past medical history.  Of note, was recently determined that the adrenal adenoma had increased in size from 9302-5451.  A workup for this mass was performed.  Urinary metanephrines were elevated but other testing was within normal limits.  She was therefore deferred to endocrinology for an opinion as to whether this could be a pheochromocytoma.  Dr. Salinas is currently approaching  this is a fit were a pheochromocytoma.  The radiologist reading her most recent MRI cast some doubt.  Of note, she may be having another nuclear scan.  Nonetheless, she is currently scheduled for resection of this mass.  He is here today for discussion of this situation.  Of note, the adrenal mass has increased in size.  She is hoping to get this removed so that she can proceed with her other surgeries, namely shoulder surgery.    She has a variety of other issues to discuss today.  She is currently feeling stressed as the city where she lives is being very assertive in their recommendations that she and her  repair their garage, cut their [place] grasses etc.  She states she is feeling harassed.  Is concerned that many people in their neighborhood have lost her jobs and subsequently lost their homes.  They are on the verge of paying off their mortgage.  She would not like to lose her house.  Additionally, she is worried about her .  She digresses on many different topics.    Other medical complaints today include that with regard to shoulder pain.  She would like to have her shoulder replaced later this year.  She is worried that no anesthesiologist will want to operate on her if there is a question of pheochromocytoma.  Additionally, she complains of occasional urge incontinence.    Review of Systems:  A comprehensive review of systems was performed and was otherwise negative    PFSH:  Social History: She is .  She is on medical disability and has been for several years  History   Smoking Status     Former Smoker   Smokeless Tobacco     Never Used       Past History: Significant for COPD, remote history of alcoholism, history of mental health issues, history of hemochromatosis and breast cancer remotely  Current Outpatient Prescriptions   Medication Sig Dispense Refill     albuterol (PROVENTIL) 2.5 mg /3 mL (0.083 %) nebulizer solution Take 3 mL (2.5 mg total) by nebulization every 6 (six)  hours as needed for wheezing. 75 mL 12     budesonide-formoterol (SYMBICORT) 160-4.5 mcg/actuation inhaler Inhale 2 puffs 2 (two) times a day as needed.        cyanocobalamin, vitamin B-12, 2,500 mcg Subl Place under the tongue 2 (two) times a week.       DULoxetine (CYMBALTA) 30 MG capsule   1     folic acid (FOLVITE) 1 MG tablet TAKE 1 TABLET (1 MG) BY ORAL ROUTE ONCE DAILY 90 tablet 3     HYDROcodone-acetaminophen (NORCO )  mg per tablet Take 1 tablet by mouth every 6 (six) hours as needed for pain. 112 tablet 0     levothyroxine (SYNTHROID) 150 MCG tablet Alternate one tablet with her other dose of 175 g.  Or take this tablet every other day. 15 tablet 11     levothyroxine (SYNTHROID, LEVOTHROID) 175 MCG tablet Every other day 20 tablet 11     LORazepam (ATIVAN) 1 MG tablet as needed.  0     metoprolol succinate (TOPROL-XL) 25 MG Take 1.5 tablets (37.5 mg total) by mouth daily. 45 tablet 3     morphine (MS CONTIN) 15 MG 12 hr tablet Take 1 tablet (15 mg total) by mouth at bedtime. 30 tablet 0     omeprazole (PRILOSEC) 20 MG capsule TAKE ONE CAPSULE BY MOUTH TWO TIMES A  capsule 2     pramipexole (MIRAPEX) 1 MG tablet Take 0.5 - 1 tab daily PRN 90 tablet 3     prazosin (MINIPRESS) 1 MG capsule Take 1 capsule (1 mg total) by mouth at bedtime. (Patient taking differently: Take 2 mg by mouth 2 (two) times a day. )  0     temazepam (RESTORIL) 15 mg capsule as needed.   1     tiotropium (SPIRIVA) 18 mcg inhalation capsule Place 18 mcg into inhaler and inhale daily.       triamcinolone (KENALOG) 0.1 % ointment Thin layer bid 80 g 0     VITAMIN D2 50,000 unit capsule TAKE 1 CAPSULE (50,000 UNITS TOTAL) BY MOUTH 2 (TWO) TIMES A WEEK. 8 capsule 11     VOLTAREN 1 % Gel Apply a thin layer twice daily 100 g 2     No current facility-administered medications for this visit.        Family History: Significant for hemochromatosis    Physical Exam:  General Appearance:   She is pleasant.  She is not as  "agitated or irritable as she has been in the past.  Heart rate is elevated.  Weight is down 7 pounds  Vitals:    07/12/17 1409   BP: 112/68   Patient Site: Left Arm   Patient Position: Sitting   Cuff Size: Adult Large   Pulse: (!) 102   Weight: (!) 225 lb (102.1 kg)   Height: 5' 4.75\" (1.645 m)     Wt Readings from Last 3 Encounters:   07/12/17 (!) 225 lb (102.1 kg)   06/20/17 (!) 232 lb 9.6 oz (105.5 kg)   06/13/17 (!) 229 lb (103.9 kg)     Body mass index is 37.73 kg/(m^2).        Data Review:    Analysis and Summary of Old Records (2): Reviewed endocrinology consult and surgery consults    Records Requested (1): 0      Other History Summarized (from other people in the room) (2): 0    Radiology Tests Summarized (XRAY/CT/MRI/DXA) (1): Reviewed MRI    Labs Reviewed (1): Ordered labs    Medicine Tests Reviewed (EKG/ECHO/COLONOSCOPY/EGD) (1): 0    Independent Review of EKG or X-RAY (2): 0        "

## 2021-06-11 NOTE — TELEPHONE ENCOUNTER
Received MTM referral from provider     Patient was not reachable after several attempts, will route to MTM Pharmacist/Provider as an FYI. Left MTM scheduling information on patients voicemail.    Thank you for the referral,    Gina Jama, MTM Coordinator

## 2021-06-11 NOTE — TELEPHONE ENCOUNTER
Spoke with pt who wanted to let pcp know that she went to [facility] 8/28/20-8/31/20.  Pt reports she was taken off a lot of her medications.  Pt also wanted to voice her frustrations with care connection, and the difficulty to get a hold of someone who works with her doctor.     PT has follow up appt with pcp on 9/4/20.  Hospital records in care everywhere.

## 2021-06-12 NOTE — PROGRESS NOTES
Mental Health Visit Note    7/31/2017    Start time: 915    Stop Time: 1000   Session # 10    Randi Damon is a 61 y.o. female is being seen today for    Chief Complaint   Patient presents with     Mental Health Visit   .     New symptoms or complaints: None    Functional Impairment:   Personal: 4  Family: 4  Work: NA  Social:3    Clinical assessment of mental status: Patient presented alert and oriented x3.  She was well-groomed.  Her attire was appropriate.  Patient was cooperative.  Patient motor actively was normal and eye contact appropriate.  Patients mood was anxious and affect congruent.  Patient s speech and language was normal.  Patient attention and thought process normal.  Concentration was appropriate.  Patient denied delusions or hallucinations. Patient denied suicidal or homicidal ideation.  Patient denied any long or short term memory problems. No evidence of impairment in judgment.  She has insight and fund of knowledge was adequate.        Suicidal/Homicidal Ideation present: None Reported This Session    Patient's impression of their current status: Pt reports anxiety related to upcoming surgery.     Therapist impression of patients current state: Processed patients upcoming surgery & her past month of preparing for it.  Discussed self-care and expectations of surgery outcome.  Pt struggling with multiple health issues that is greatly impacting her mood and physical functioning.  We processed some coping strategies. Updated treatment plan and assessments. PHQ-9- 18, CHAVO-7 - 18.      Type of psychotherapeutic technique provided: Insight oriented, Client centered, Solution-focused and CBT    Progress toward short term goals:Progress as expected, self-care, coping strategies    Review of long term goals: Date of last review 7/31/2017    Diagnosis:   1. Bipolar disorder, current episode mixed, severe, without psychotic features    2. Generalized anxiety disorder    3. Chronic pain syndrome         Plan and Follow up: Practice self-care as you prepare & recovery from surgery  Follow all medical recommendations as scheduled  Follow up with Mabel in 1-2 weeks after surgery        Discharge Criteria/Planning: Patient will continue with follow-up until therapy can be discontinued without return of signs and symptoms.    Shanice Merida 7/31/2017      This note was created with help of Dragon dictation software. Grammatical / typing errors are not intentional and inherent to the software.

## 2021-06-12 NOTE — PROGRESS NOTES
I reviewed Dr. Angela Camacho's very thorough note of 7/13/2017.  Although there are atypical features of both the clinical picture and the MRI, the radiologists agree that this is most likely a pheochromocytoma.  She reviewed the history and lab profile with an expert at the [facility], who concurs with that diagnosis.  We will plan to proceed with left adrenalectomy, as she has a growing mass that is most likely a pheochromocytoma.

## 2021-06-12 NOTE — ANESTHESIA POSTPROCEDURE EVALUATION
Patient: Randi Damon  LAPAROSCOPIC LEFT ADRENALECTOMY,   Anesthesia type: general    Patient location: [facility] Surgical Floor  Last vitals:   Vitals:    08/03/17 0720   BP: 108/58   Pulse: 77   Resp: 16   Temp: 36.5  C (97.7  F)   SpO2: 97%     Post vital signs: stable  Level of consciousness: awake and responds to simple questions  Post-anesthesia pain: pain controlled  Post-anesthesia nausea and vomiting: no  Pulmonary: unassisted, return to baseline  Cardiovascular: stable and blood pressure at baseline  Hydration: adequate  Anesthetic events: bruise right upper arm    Additional Notes: Following up on bruise on right arm. No change in color or size. Patient also has estelle size bruise on antecubital right side from blood draw site. Cat site looks clean and dry with no bruise. She states she bruises very easily.

## 2021-06-12 NOTE — TELEPHONE ENCOUNTER
Who is calling:  Patient's , Sidney  Reason for Call:  Caller stated the patient wanted Loida Stockton MD to call her back. Caller is not sure what the patient's questions are, just that the patient was taken off all her medications and wanted to discuss this with Loida Stockton MD.   Date of last appointment with primary care: 10/02/20  Okay to leave a detailed message: No
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
## 2021-06-12 NOTE — PROGRESS NOTES
"HPI: Pt is here for follow up of a laparoscopic adrenalectomy for pheochromocytoma.  She is doing well, although her energy has been down and she had a cough which apparently proved to be a pneumonia.  She is doing better now and is up and around and walking outside. Her appetite is good, and bowel function regular.  No fevers or chills. Ambulating without problems.  Her  notes that she has not had any episodes of rage since her surgery.    Allergies, Medications, Social History, Past Medical History and Past Surgical History were reviewed and are noted in the chart.    /86  Pulse 92  Ht 5' 6\" (1.676 m)  Wt (!) 224 lb (101.6 kg)  SpO2 93%  BMI 36.15 kg/m2      EXAM: This is a  62 y.o. female in no distress  GENERAL: Appears well  CHEST clear anteriorly  CVS S1S2 NSR  ABDOMEN: Soft, non-tender.  Wounds healing well.    PATHOLOGY:  LEFT ADRENAL GLAND, ADRENALECTOMY:       1. PHEOCHROMOCYTOMA           a. SIZE: 2.8 X 2.0 X 2.2 CM           b. CONFINED TO ADRENAL GLAND; EXTRA-ADRENAL SOFT TISSUE EXTENSION               NOT IDENTIFIED           c. TUMOR DEMONSTRATES DIFFUSE STAINING FOR CHROMOGRANIN AND NEGATIVE               STAINING FOR INHIBIN, SUPPORTING DIAGNOSIS       2. HISTOLOGICALLY UNREMARKABLE ADRENAL CORTEX   Electronically signed by Lachelle Subramanian MD on 8/3/2017 at 1413   Microscopic Description  Microscopic examination performed, substantiating the above diagnosis.     All controls stain appropriately.      Clinical Information  Pre-op Diagnosis: Pheochromocytoma [D35.00]  Time in Formalin: 01:40 pm   Gross Description  The specimen is received in formalin and is identified as left adrenal. It consists of a 22 gram, 4.3 x 2.7 x 3.0 cm nodular adrenal gland. The external surface is marked with black ink. Cut section shows adrenal cortex, 0.1-0.2 cm in thickness. There is a well-circumscribed 2.8 x 2.0 x 2.2 cm subcortical nodule with a mottled gray-pink to tan cut surface. Gross extension " through the adrenal cortex and into adjacent adipose tissue is not identified. RS-4C  KLW:dls      Charges  CPT: 64555, 42505, 63246   ICD-10: D35.02   Result Flag Normal    Comments:           Assessment/Plan: Randi NICOLE Damon is following up after left adrenalectomy. Doing well. Discharge from clinic. Return if any problems.    Gab Linares MD  [facility] Department of Surgery

## 2021-06-12 NOTE — PROGRESS NOTES
"[facility] Follow Up Note    Assessment/Plan:  1. Serotonergic syndrome  Resolved-status post cessation of most of her psychiatric meds.  She will be working with a clean \"slate\".  Would like Dr. Dubois to work with her on mental health issues along with pain.    2. Anxiety state, unspecified  Continues to be clearly irritable and agitated.  Experiencing bouts of rage.  She attributes this to possible pheochromocytoma, however, as I explained, she is off all of her medications.  This is likely the reason for her agitation.  She had a mildly elevated normetanephrine level.  This will be repeated after she is able to sleep and use CPAP.      3. Hypokalemia  We will update labs  - KLOR-CON M20 20 mEq tablet; Take 1 tablet (20 mEq total) by mouth 2 (two) times a day. On 7th day taking only half tab  Dispense: 90 tablet; Refill: 3  - Basic Metabolic Panel; Future    4. Hypertension due to endocrine disorder  She is on losartan with reasonable blood pressure    5. Impaired fasting glucose  Now off metformin.  She will need an update on her glycohemoglobin.  She is going to have labs in a couple of weeks.  We will do surveillance A1c at that time.  Have discussed the importance of a weight reduction diet.  She eats large volumes of bread.  We have discussed reducing gluten and starchy carbs.  Hopefully, she can be maintained off medications.  If not, could consider a GLP-1 agonist.  - Glycosylated Hemoglobin A1c; Future    6. Acquired hypothyroidism  Elevated TSH.  Question dosage interruption with hospitalization.  We will check TSH at next visit and make adjustments accordingly  - Thyroid Stimulating Hormone (TSH); Future    7. Hereditary hemochromatosis (H)  Followed by hematology    8. Restless Legs Syndrome  Now off gabapentin and all serotonergic substances.  She has loose stools and therefore magnesium will not be helpful.  Because of hereditary hemochromatosis cannot be on iron.  Recommendation: Use " "1 Valium at bedtime and 2 Tylenol.      Reassess for preop-shoulder replacement surgery if all issues are stable  We discussed the importance of getting regular exercise i.e. swimming daily and eating a lower starch diet  Loida Stockton MD    Chief Complaint:  Chief Complaint   Patient presents with     Follow-up       History of Present Illness:  Randi is a 65 y.o. female who is here today accompanied by her  him.  Of note, Winnie has been going through a rough patch.  She was hospitalized for serotonergic syndrome.  She was on a variety of medications with elevated serotonin levels.  She has now been taken off all of these meds.  As a result, her anxiety appears to be quite heightened.  She is going to be off medications and restart with Dr. Dubois.  He is also managing her chronic pain which comes from a variety of issues-specifically back and feet.    At last visit, she was dismayed regarding all of the medications.  As we have discussed, she has multiple \"cooks in the kitchen \".  She is seen by pulmonary, cardiology, endocrinology, psychiatry, pain, etc.  We set her up to have an [facility] pharmacy visit to go over each and every medication and discussed potential complications and to help her better understand this.  This was a helpful session for her.    Since last visit, she has discontinued her metformin as she has been having some loose stools.  She is not doing any glucose testing.  However, she has managed to lose some weight.  She has a history of eating voluminous amounts of bread.  She is trying to cut back on gluten and gluten products.    She wishes to discuss her TSH today.  She would like me to run her thyroid higher so she could lose weight.  I have explained to her that this could contribute to her agitation.    She wishes to have a shoulder replacement.  This has been postponed in the past due to medical problems.  More recently, her normetanephrine level was elevated as she complained " "of agitated feeling similar to when she had a pheochromocytoma.  She is working with her endocrinologist on this.    She has obstructive sleep apnea and is not treating this.  Her sleep quality is poor.    Her  describes her as very unproductive during the day.  He states that she behaves as if she has completed and works so hard.  However, he states that she quotes does not get much done \".    Review of Systems:  A comprehensive review of systems was performed and was otherwise negative    PFSH:  Social History: He is .  They do not have any children.  She has been on a medical disability for several years.  She has had hereditary hemochromatosis/breast cancer and multiple medical problems  Social History     Tobacco Use   Smoking Status Former Smoker     Quit date: 2003     Years since quittin.1   Smokeless Tobacco Never Used       Past History:   Past Medical History:   Diagnosis Date     Anxiety      Breast cancer (H)      Chronic pain syndrome      COPD (chronic obstructive pulmonary disease) (H)      Depression      Esophageal reflux      Graves disease      Hemochromatosis      Hx antineoplastic chemotherapy      Hx of radiation therapy      Hypertension      Impaired fasting glucose      Pheochromocytoma      Psoriasis      Psoriatic arthropathy (H)      Restless leg syndrome      Right rotator cuff tear      Sleep apnea 2017    CPAP     Spinal stenosis      Urinary incontinence      Vitamin B12 deficiency        Current Outpatient Medications   Medication Sig Dispense Refill     albuterol (PROAIR HFA;PROVENTIL HFA;VENTOLIN HFA) 90 mcg/actuation inhaler Inhale 2 puffs every 4 (four) hours as needed.       albuterol (PROVENTIL) 2.5 mg /3 mL (0.083 %) nebulizer solution Take 3 mL (2.5 mg total) by nebulization every 6 (six) hours as needed for wheezing. 75 mL 12     aspirin 81 MG EC tablet Take 81 mg by mouth daily.       buprenorphine (BUTRANS) 10 mcg/hour PTWK patch Place 1 patch " on the skin every 7 days. 4 patch 0     cholecalciferol, vitamin D3, (VITAMIN D3) 5,000 unit Tab Take 10,000 Units by mouth daily.       cyanocobalamin, vitamin B-12, 5,000 mcg Subl Place 5,000 mcg under the tongue daily.       diazePAM (VALIUM) 2 MG tablet Take 2 mg by mouth.       diclofenac sodium (VOLTAREN) 1 % Gel Apply 2 g topically 4 (four) times a day. To shoulder 300 g 3     fluticasone-umeclidinium-vilanterol (TRELEGY ELLIPTA) 100-62.5-25 mcg DsDv inhaler Inhale 1 Inhalation daily. 3 each 3     furosemide (LASIX) 20 MG tablet Take 40 mg by mouth daily.  3     KLOR-CON M20 20 mEq tablet Take 1 tablet (20 mEq total) by mouth 2 (two) times a day. On 7th day taking only half tab 90 tablet 3     levothyroxine (SYNTHROID, LEVOTHROID) 175 MCG tablet Take 1 tablet daily for 6 days of the week, 7th day PRN (Patient taking differently: 175 mcg daily. ) 14 tablet 0     losartan (COZAAR) 25 MG tablet Take 25 mg by mouth daily.       medical cannabis (Patient's own supply) by Other route see administration instructions. (The purpose of this order is to document that the patient reports taking medical cannabis. This is not a prescription, and is not used to certify that the patient has a  qualifying medical condition.)   THC- Red Tinsure-  Green/Yellow/ Red capsules  CBD sublingual spray       omeprazole (PRILOSEC) 20 MG capsule TAKE 1 CAPSULE BY MOUTH 2 TIMES A  capsule 2     Current Facility-Administered Medications   Medication Dose Route Frequency Provider Last Rate Last Dose     albuterol nebulizer solution 2.5 mg (PROVENTIL)  2.5 mg Inhalation Once Loida Stockotn MD         cyanocobalamin injection 1,000 mcg  1,000 mcg Intramuscular Q30 Days Loida Stockton MD   1,000 mcg at 05/28/19 0912       Family History: Significant for hereditary hemochromatosis    Physical Exam:  General Appearance:   Her eyes are very red.  She appears mildly agitated  Vitals:    10/02/20 1216   BP: 118/80   Patient Site:  "Right Arm   Patient Position: Sitting   Cuff Size: Adult Large   Pulse: 84   Resp: 18   SpO2: 95%   Weight: (!) 254 lb 8 oz (115.4 kg)   Height: 5' 6\" (1.676 m)     Wt Readings from Last 3 Encounters:   10/02/20 (!) 254 lb 8 oz (115.4 kg)   09/18/20 (!) 258 lb (117 kg)   09/04/20 (!) 261 lb (118.4 kg)     Body mass index is 41.08 kg/m .    No exam today    Data Review:    Analysis and Summary of Old Records (2): Viewed care everywhere to include notes from Dr. Coker-endocrinology/cardiology notes/pharmacy notes    Records Requested (1):       Other History Summarized (from other people in the room) (2): Her  contributes to the history    Radiology Tests Summarized (XRAY/CT/MRI/DXA) (1):     Labs Reviewed (1): Ordered labs    Medicine Tests Reviewed (EKG/ECHO/COLONOSCOPY/EGD) (1):     Independent Review of EKG or X-RAY (2):         "

## 2021-06-12 NOTE — PROGRESS NOTES
Ms. Damon returns for further discussion of her pheochromocytoma.  I discussed in detail with her that I had evaluated Dr. Camacho's notes, and that Dr. Camacho, the radiologist, and the expert endocrinologist at [facility] agree that this is a pheochromocytoma.  This is important, because her presentation has not been entirely typical.  In addition to that, it is an enlarging left adrenal mass.  For all these reasons, I believe it should be removed.    We discussed both open and laparoscopic approaches, and we will attempt a laparoscopic operation but I did make it very clear to her that we may not be able to do this laparoscopically and she understands that.  We discussed the recovery, and I all answered all her questions and all her 's questions.  We will proceed with laparoscopic left adrenalectomy.

## 2021-06-12 NOTE — ANESTHESIA CARE TRANSFER NOTE
"Last vitals:   Vitals:    08/02/17 1422   BP: 161/83   Pulse: 92   Resp: (!) 6   Temp: 36.8  C (98.2  F)   SpO2: 96%     Patient's level of consciousness is drowsy  Spontaneous respirations: yes  Maintains airway independently: yes  Dentition unchanged: yes  Oropharynx: oropharynx clear of all foreign objects    QCDR Measures:x  ASA# 20 - Surgical Safety Checklist: WHO surgical safety checklist completed prior to induction  PQRS# 430 - Adult PONV Prevention: 4558F - Pt received => 2 anti-emetic agents (different classes) preop & intraop  ASA# 8 - Peds PONV Prevention: NA - Not pediatric patient, not GA or 2 or more risk factors NOT present  PQRS# 424 - Ashlyn-op Temp Management: 4559F - At least one body temp DOCUMENTED => 35.5C or 95.9F within required timeframe  PQRS# 426 - PACU Transfer Protocol: - Transfer of care checklist used  ASA# 14 - Acute Post-op Pain: ASA14B - Patient did NOT experience pain >= 7 out of 10   Bruise noted on the underside of right arm, (approx. 5 cm x 2 cm).  Pt complained of  the sensation of a \"rug burn\" in that area.  Dr. Sandoval notified of incident.  "

## 2021-06-12 NOTE — TELEPHONE ENCOUNTER
Refill request: butrans   Last ov with BE: 9/18  Follow up in 8 weeks    UDT 07/23/2019   CSA 06/06/2019    Next follow up with BE: 12/01  Last filled on 9/19-28 days    Requested Prescriptions     Pending Prescriptions Disp Refills     buprenorphine (BUTRANS) 10 mcg/hour PTWK patch 4 patch 0     Sig: Place 1 patch on the skin every 7 days.     Cub

## 2021-06-12 NOTE — PROGRESS NOTES
Subjective:   Randi Damon is a 61 y.o. female who presents for evaluation of pain. See rooming evaluation. Patient was last seen 6/13/17.     CC: Pain. Review of current status. Patient general opinion of symptoms management and function is fair    Major issues:  1. Chronic pain syndrome    2. Pain in right foot    3. Sacroiliitis          Patient Active Problem List   Diagnosis     Psoriatic Arthropathy     Osteopenia     Restless Legs Syndrome     Vitamin B12 Deficiency     Depression     Hypothyroidism     Vitamin D Deficiency     Hemochromatosis     Impaired Fasting Glucose     Breast Cancer     Morbid Obesity     Incidental Adrenal Cortical Adenoma     Hypertension     Esophageal Reflux     Psoriasis     Spinal Stenosis     Benign Adenomatous Polyp Of The Large Intestine     Joint Pain, Localized In The Hip     Anxiety state, unspecified     Sacroiliitis     Neck pain     Acute pain of right shoulder     COPD (chronic obstructive pulmonary disease)     Sleep apnea, obstructive           HPI:   Impact of pain treatments:   Analgesia: fair  ADL's:  Grooming: Able to bath, dress and eat w/o assistance. Household: Patient is not able to participate in general chores.   Social: Travel to [place]  In November  AE's: none   Aberrant behavior: none    Aggravating factors: unable to determine at this time  Alleviating factors: medication  DME: CPAP (She does think she is sleeping better)  Location/Laterality of the pain: right shoulder, right arm, neck, right ankle and foot  Timing: constant  Quality: throbbing  Severity:3/10    Activities Impaired by Increasing Pain Severity: F= 6  3-Enjoy  4-Work, Enjoy  5-Active, Mood Work Enjoy  6-Sleep, Active, Mood Work Enjoy  7-Walk, Sleep, Active, Mood Work Enjoy  8-Relate, Walk, Sleep, Active, Mood Work Enjoy      Medication:   Last opioid dose was Hydrocodone-acetaminophen last taken 07/31/17 @ 630a, MS Contin last dose 7/30/17.      Current Outpatient Prescriptions:       albuterol (PROVENTIL) 2.5 mg /3 mL (0.083 %) nebulizer solution, Take 3 mL (2.5 mg total) by nebulization every 6 (six) hours as needed for wheezing., Disp: 75 mL, Rfl: 12     budesonide-formoterol (SYMBICORT) 160-4.5 mcg/actuation inhaler, Inhale 2 puffs 2 (two) times a day as needed. , Disp: , Rfl:      cyanocobalamin, vitamin B-12, 2,500 mcg Subl, Place under the tongue 2 (two) times a week., Disp: , Rfl:      DULoxetine (CYMBALTA) 30 MG capsule, , Disp: , Rfl: 1     folic acid (FOLVITE) 1 MG tablet, TAKE 1 TABLET (1 MG) BY ORAL ROUTE ONCE DAILY, Disp: 90 tablet, Rfl: 3     levothyroxine (SYNTHROID) 150 MCG tablet, Alternate one tablet with her other dose of 175 g.  Or take this tablet every other day., Disp: 15 tablet, Rfl: 11     levothyroxine (SYNTHROID, LEVOTHROID) 175 MCG tablet, Every other day, Disp: 20 tablet, Rfl: 11     LORazepam (ATIVAN) 1 MG tablet, as needed., Disp: , Rfl: 0     metoprolol succinate (TOPROL-XL) 25 MG, Take 1 tablet (25 mg total) by mouth daily., Disp: 25 tablet, Rfl:      morphine (MS CONTIN) 15 MG 12 hr tablet, Take 1 tablet (15 mg total) by mouth at bedtime. PRN, Disp: 30 tablet, Rfl: 0 - assistive but not taking every night     omeprazole (PRILOSEC) 20 MG capsule, TAKE ONE CAPSULE BY MOUTH TWO TIMES A DAY, Disp: 180 capsule, Rfl: 2     pramipexole (MIRAPEX) 1 MG tablet, Take 0.5 - 1 tab daily PRN (Patient taking differently: 2 (two) times a day. Take 0.5 - 1 tab daily PRN), Disp: 90 tablet, Rfl: 3     prazosin (MINIPRESS) 1 MG capsule, Take 2 capsules (2 mg total) by mouth 2 (two) times a day., Disp: , Rfl:      temazepam (RESTORIL) 15 mg capsule, 7.5-15 mg as needed. , Disp: , Rfl: 1     triamcinolone (KENALOG) 0.1 % ointment, Thin layer bid, Disp: 80 g, Rfl: 0     VITAMIN D2 50,000 unit capsule, TAKE 1 CAPSULE (50,000 UNITS TOTAL) BY MOUTH 2 (TWO) TIMES A WEEK., Disp: 8 capsule, Rfl: 11     VOLTAREN 1 % Gel, Apply a thin layer twice daily, Disp: 100 g, Rfl: 2      "HYDROcodone-acetaminophen (NORCO )  mg per tablet, Take 1 tablet by mouth every 6 (six) hours as needed for pain., Disp: 112 tablet, Rfl: 0     tiotropium (SPIRIVA) 18 mcg inhalation capsule, Place 18 mcg into inhaler and inhale daily., Disp: , Rfl:      Interventional: She has an appt with Dr. Linares coming up. She reports the surgery is Wed    Mental Health: She is working with [facility]    Review of Systems   Constitutional- improved sleep disturbances, some activity intolerance  Musculoskeletal- + pain  Neuro- - cognitive changes,  + neuropathic symptoms  Psych-  + mood concerns,  - taking medication in a fashion other than prescribed    Social  Family History   Problem Relation Age of Onset     Heart disease Father      Obesity Father           Cardiovascular Father       Heart disease     Other Father      Hemachromatosis     Obesity Mother           Arthritis Mother           Obesity Sister           Cardiovascular Sister           Hypertension Sister           Arthritis Sister           Other Sister      Lupus, scleraderma, Hemachromatosis     Cardiovascular Brother      Hypertension Brother      Arthritis Brother      Other Brother      Prostrate cancer, hemachromatosis     Cardiovascular Maternal Grandmother           Stroke Paternal Grandmother           Breast cancer Neg Hx      History   Smoking Status     Former Smoker   Smokeless Tobacco     Never Used     History   Alcohol Use No     History   Drug Use No     History   Sexual Activity     Sexual activity: Not on file       Objective:     Vitals:    17 1015   BP: 122/75   Pulse: 81   Resp: 16   Weight: (!) 230 lb (104.3 kg)   Height: 5' 6\" (1.676 m)   PainSc:   3       Constitutional:  Pleasant and cooperative female who presents alone today.   Psychiatric: Mood and affect are appropriate for the situation, setting and topic of discussion.  Patient does " not appear sedated.  Integumentary:  Observed skin WNL  HEENT: EOM's grossly intact.    Chest: Breathing is non-labored.   Neurological:  Alert and oriented in all spheres including: time, place, person and situation.    Diagnostics:   Lab:  Last UDT on 11/10/16     Imaging:  No new imaging available to review    :  Dated 7/31/2017    Assessment:   Randi Damon is a 61 y.o. female seen in clinic today for chronic pain. She has been struggling with symptoms from an adrenal tumor and surgery to address her symptoms of her right shoulder have been placed on hold while it is being addressed.    *Universal Precautions:    UDS/Swab-SWAB 11/10/16.   Agreement- 5/17/17  Pharmacy- as documented    Count- n/a   Psychological evaluation 10/23/14  7/31/17 MME- 55  Pharmacogenetic testing- n/a  MTM: n/a    The patient has chronic pain and is being  sustained on a ER/LA opioid for pain symptoms severe enough to warrant around the clock care with an opioid medication.    Management of opioid medication is inherently a moderate to high complex medial interaction based on the risk management required at each contact r/t risks and side effects.    Plan:   Plan/NextSteps:     Follow up: 1 month      Education:   Please note there are 2 Anne-Marie's at the [facility]. Today you saw Anne-Marie Cheunggins when communicating any needs please specify the last name. Thank you    Please call Monday-Friday for problems or questions and one of the clinical support staff (CSS) will help to get things figured out. The number is [phone]. Some folks are using [employer] to send an e-mail ([employer] message). Please remember some issues require an office visit.     Today we reviewed the plan of care and answered questions.      Health Maintenance: Please continue to see primary care for general medical prevention and illness care.    Mental Health: Continue with Mabel Merida    Interventional: I understand surgery os scheduled for  Wed  Patient have adrenal mass surgical intervention w/ Dr. Linares at [facility].    The CDC Guideline is not about post-operative pain management it is about the primary care approach to pain management. Your opioid agreement does not cover post-surgical pain management as this is a more acute situation and not chronic pain care.    I would expect the surgeon and the surgeons team to managed the pain after the surgery. I would expect you will briefly need more medication as this is not part of you chronic pain care. Use of your chronic pain medication for an acute situation prevents you from being able to fill the medication as it appears you are overtaking the medication and is looked at differently by your insurance company.    If you have pain that is uncontrolled it may be a complication of surgery and this would need to be evaluated further. Should you be struggling you must take all your pills to the surgeon office or to an ED where they can do a count to demonstrate you are not drug seeking.     Consultation: In the past we spoke about seeing Dr. Dumont for the sleep issues. I understand you are working with the current sleep center and this has seemed like there are some struggles at this time.    Records: Reviewed to assist with preparation for the office visit and are reflected throughout the note.    Medication:   Medication prescribed today     Requested Prescriptions     Pending Prescriptions Disp Refills     HYDROcodone-acetaminophen (NORCO )  mg per tablet 8/5-9/2 112 tablet 0     Sig: Take 1 tablet by mouth every 6 (six) hours as needed for pain.     morphine (MS CONTIN) 15 MG 12 hr tablet 8/5-9/2 28 tablet 0     Sig: Take 1 tablet (15 mg total) by mouth at bedtime. PRN       REFILL INSTRUCTIONS:  Please contact the clinic refill line 7 days before your refill is due. Speak clearly; note cell phones cut in-and-out and poor quality speech and reception issues will influence our  ability to hear you and be efficient with your prescription.     Call [phone] leave:   Your name (first and last w/ spelling)   Date of birth  Name of all the medication(s) being requested  Dose of the medication(s)   How you are taking the medication (eg. twice per day etc).     Contact your pharmacy 3 (three) days after leaving your message to see if your prescription has been received. Please request the pharmacy check your profile to be certain about any concerns with a script failing to be received. Note: Chas updates have been inconsistent.  If the script has not been received there may have been a problem with the communication please reach back out to the clinic.       SAFETY REMINDERS  No alcohol while taking controlled substances. Alcohol is not an illegal substance, it is unsafe to use in combination. It is a build up of substances in the body that can be extremely hazardous and may cause respirations to slow to a dangerous rate resulting in hospitalization, brain damage, or death.    Unintentional overdose and death.    People are not always in perfect health. Sometimes the body is weak or compromised because of an illness like the flu, pneumonia, low bood etc. When the body struggles, even though a person is taking their medication as prescribed, the medication may be too much for the body to handle. Combinations of medication can also put a person at high risk. In one study it was noted that 80% of unintentional overdoses occurred in people who were taking a combination of opioids and benzodiazepines.    A big concern would be if someone else got your medication. Your medication is not prescribed to anyone other than you. Your medication could cause harm or death to someone else.    Naloxone is a rescue medication. A person may need the rescue medication if experiencing an unexpected overdose. The medication is meant to give a person a break by lifting off one burden, the opioid narcotic  medication. Medical care is required because the a persons body is compromised and needs further testing and assistance.    Symptoms of overdose include:   !breathing slow and shallow, erratic or not at all  !pinpoint pupils, hallucinations  !confusion  !muscle jerks, slack muscles   !extreme sleepiness or loss of alertness   !awake but not able to talk   !face pale or clammy, vomiting, for lighter skinned people, the skin tone turns bluish purple, for darker skinned people, it turns grayish or ashen   If in a situation where overdose is a concern engage the emergency response system (dial 911).    Do not sell, loan, borrow or share your opioid medication with anyone. Deaths have occurred as a result of this practice. It is illegal and patients are being prosecuted.     Prevent unexpected access/loss of medication: Keep medication locked. Only carry what you need with you.      Patient Arrived @ 0909 for a 1020 appointment.     TT: 1053  1113  CT: over half spent in education and counseling as outlined in the plan.      Consuelo Little APRN FNP-BC  [address] [facility] [address]   [phone]  [phone]

## 2021-06-12 NOTE — ANESTHESIA POSTPROCEDURE EVALUATION
Patient: Randi Damon  LAPAROSCOPIC LEFT ADRENALECTOMY,   Anesthesia type: general    Patient location: PACU  Last vitals:   Vitals:    08/02/17 1429   BP: (!) 143/107   Pulse: 90   Resp: 19   Temp:    SpO2: 95%     Post vital signs: stable  Level of consciousness: awake and responds to simple questions  Post-anesthesia pain: pain controlled  Post-anesthesia nausea and vomiting: no  Pulmonary: unassisted, return to baseline  Cardiovascular: stable and blood pressure at baseline  Hydration: adequate  Anesthetic events: no    QCDR Measures:  ASA# 11 - Ashlyn-op Cardiac Arrest: ASA11B - Patient did NOT experience unanticipated cardiac arrest  ASA# 12 - Ashlyn-op Mortality Rate: ASA12B - Patient did NOT die  ASA# 13 - PACU Re-Intubation Rate: ASA13B - Patient did NOT require a new airway mgmt  ASA# 10 - Composite Anes Safety: ASA10A - No serious adverse event  ASA# 38 - New Corneal Injury: ASA38A - No new exposure keratitis or corneal abrasion in PACU    Additional Notes: Dark bruise size of 2 half dollars fleshy inside right upper arm. Dark blue in color. Looks old, but patient does not remember having it there before. Patient was carefully positioned with pillows and cushions by anesthesiologist and cRNA. Patient very understanding at this point and happy with care. Will follow up tomorrow.

## 2021-06-12 NOTE — ANESTHESIA PREPROCEDURE EVALUATION
Anesthesia Evaluation      Patient summary reviewed   No history of anesthetic complications     Airway   Mallampati: II   Pulmonary - normal exam   (+) COPD, sleep apnea on CPAP, ,                          Cardiovascular   Exercise tolerance: > or = 4 METS  (+) hypertension, ,     ECG reviewed (NS RBBH no PVCs)  Rhythm: regular  Rate: normal,         Neuro/Psych    (+) neuromuscular disease,  depression,     Comments: Some chronic back pain    Endo/Other    (+) hypothyroidism, obesity,      Comments: Incidental Adrenal Cortical Adenoma  Atypical presentation - No Hypertension, PVC, tachycardia. Some mild flushing reported. She is alpha blocked with minipress, and beta blocked with metoprolol  Right shoulder pain    GI/Hepatic/Renal    (+) GERD well controlled,        Other findings:  Psoriatic Arthropathy    Osteopenia    Restless Legs Syndrome    Vitamin B12 Deficiency    Depression    Hypothyroidism    Vitamin D Deficiency    Hemochromatosis    Impaired Fasting Glucose    Breast Cancer    Morbid Obesity    Incidental Adrenal Cortical Adenoma    Hypertension    Esophageal Reflux    Psoriasis    Spinal Stenosis    Benign Adenomatous Polyp Of The Large Intestine    Joint Pain, Localized In The Hip    Anxiety state, unspecified    Sacroiliitis    Neck pain    Acute pain of right shoulder    COPD (chronic obstructive pulmonary disease)    Sleep apnea, obstructive        Results for ERIK LOVE (MRN [id number]) as of 8/2/2017 10:15    7/25/2017 12:15  Sodium: 142  Potassium: 4.2  Chloride: 104  CO2: 27  Anion Gap, Calculation: 11  BUN: 12  Creatinine: 0.61  GFR MDRD Af Amer: >60  GFR MDRD Non Af Amer: >60  Calcium: 9.4  AST: 18  ALT: 15  ALBUMIN: 3.7  Protein, Total: 6.5  Alkaline Phosphatase: 79  Bilirubin, Total: 0.5  Results for ERIK LOVE (MRN [id number]) as of 8/2/2017 10:15    7/25/2017 12:15  WBC: 4.7  RBC: 4.74  Hemoglobin: 15.3  Hematocrit: 44.8  MCV: 95  MCH: 32.3  MCHC: 34.2  RDW:  11.4  Platelets: 208        Dental - normal exam                        Anesthesia Plan  Planned anesthetic: general endotracheal  Appears to be alpha and beta block. Minimal symptoms/atypical presentation  Pre op sedation  2 IVs  [place]  Nitroprusside and shawna drips available  LTA  And glidescope to minimize intubation stress    ASA 3   Induction: intravenous   Anesthetic plan and risks discussed with: patient and spouse

## 2021-06-12 NOTE — TELEPHONE ENCOUNTER
"Discussion with patient about her mood.  She describes having postponed her surgery feeling she has been unstable in terms of her mood with depression and anger irritability.  She has tried some Valium.    She is very concerned about using serotonergic medications having sort of serotonin syndrome.    She has had some laboratories around the pheochromocytoma which did not support any problems with \"moved up\" with the 24-hour urine collection.    Describes symptoms she can \"blow up\".  Reports there is been some questions about bipolar disorder in the past though she does not think she is cycling.  Reviewing the record she had been on lamotrigine in the past through previous psychiatrist.  Does not recall Depakote or lithium.    We have tried ketamine, she felt initially when this started, with the 10 or 20 mg dose having some restless legs was concerned about cost.    We discussed agents that may be helpful for the short-term.  This included trying ketamine at a lower dose.  We could use lithium which also may help with some aspects.  Again as she is very cautious about the serotonergic.    She continues seeing her therapist weekly.    The plan started is to resume ketamine at a lower dose 5 mg 4 times a day for several days, if needed 10 mg.  Reviewed with appointment scheduled 11/5.  "

## 2021-06-12 NOTE — PROGRESS NOTES
[facility] Follow Up Note    Assessment/Plan:  1. Post-operative state/status post pheochromocytoma resection  Healing uneventfully.  Sweats have improved although are still present.  Rage is improved.  Recommendation: From a surgical point of view, she is well healed.  From an endocrine point of view, this did not improve her psychiatric disease to a great extent.  I did express that she needs to continue with psychiatric care.    2. Psoriasis  Renew triamcinolone ointment    3. Restless Legs Syndrome  Recommend that she take her Mirapex 2 hours before desired sleep on a regular basis instead of as needed as needed.  She continues to complain of poor sleep quality and increased RLS    4. 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  It varies with respect to degree of irritation    5. Insomnia  Recommend Mirapex 2 hours before bedtime.  She may use melatonin.  She is receiving sleep medications from psychiatry    6. Health care maintenance  We will give flu shot  - Influenza, Seasonal,Quad Inj, 36+ MOS        Loida Stockton MD    Chief Complaint:  Chief Complaint   Patient presents with     Follow-up       History of Present Illness:  Randi is a 62 y.o. female who is here today for follow-up after having a pheochromocytoma resected.  This is her second follow-up visit.  Of note, she notes slight improvement of night sweats.  Additionally, she is not having as many fits of rage.  She does lament that she continues to have mood lability and anxiety.  We did discuss the fact that she had a long-standing history of psychiatric illness prior to the development of this tumor.  She will still need psychiatric follow-up and medications.    She is lamenting the fact that her laparoscopic scars are on her waistline.  They are well healing however.    She denies any current problems from a respiratory point of view.  She  perseverates on a variety of different topics during the interview.    She is contemplating seeing a different psychiatrist but will stick with Dr. Marroquin currently    Review of Systems:  A comprehensive review of systems was performed and was otherwise negative    PFSH:  Social History: She is .  She has been on medical disability for several years  History   Smoking Status     Former Smoker     Quit date: 8/1/2003   Smokeless Tobacco     Never Used       Past History: Remote for breast cancer and substance abuse.  She also has had hemochromatosis.  She has psoriasis.  Also, her history is significant for multiple musculoskeletal problems  Current Outpatient Prescriptions   Medication Sig Dispense Refill     albuterol (PROAIR HFA;PROVENTIL HFA;VENTOLIN HFA) 90 mcg/actuation inhaler Inhale 2 puffs every 6 (six) hours as needed for wheezing.       albuterol (PROVENTIL) 2.5 mg /3 mL (0.083 %) nebulizer solution Take 3 mL (2.5 mg total) by nebulization every 6 (six) hours as needed for wheezing. 75 mL 12     budesonide-formoterol (SYMBICORT) 160-4.5 mcg/actuation inhaler Inhale 2 puffs 2 (two) times a day as needed.        cyanocobalamin, vitamin B-12, 2,500 mcg Subl Place under the tongue 2 (two) times a week.       diclofenac sodium (VOLTAREN) 1 % Gel Apply 2 g topically 2 (two) times a day as needed (pain on shoulder and ankle).       DULoxetine (CYMBALTA) 30 MG capsule Take 1 capsule (30 mg total) by mouth 2 (two) times a day. 60 capsule 1     ergocalciferol (VITAMIN D2) 50,000 unit capsule Take 50,000 Units by mouth 2 (two) times a week.       folic acid (FOLVITE) 1 MG tablet Take 1 mg by mouth daily.       levothyroxine (SYNTHROID, LEVOTHROID) 150 MCG tablet Take 1 tablet (150 mcg total) by mouth every other day. Alternate with 175mcg tablets 15 tablet 12     levothyroxine (SYNTHROID, LEVOTHROID) 175 MCG tablet Take 1 tablet (175 mcg total) by mouth every other day. Alternate with 150mcg tablets 15 tablet  12     LORazepam (ATIVAN) 1 MG tablet Take 1 mg by mouth 2 (two) times a day as needed.   0     morphine (MS CONTIN) 15 MG 12 hr tablet Take 15 mg by mouth at bedtime as needed for pain.       omeprazole (PRILOSEC) 20 MG capsule Take 20 mg by mouth daily.       polyethylene glycol (MIRALAX) 17 gram packet Take 1 packet (17 g total) by mouth daily. 30 packet 0     pramipexole (MIRAPEX) 1 MG tablet Take 0.5-1 mg by mouth at bedtime as needed.       temazepam (RESTORIL) 7.5 MG capsule        triamcinolone (KENALOG) 0.1 % ointment Apply topically 2 (two) times a day as needed. 30 g 1     VOLTAREN 1 % Gel Apply a thin layer twice daily 100 g 2     HYDROcodone-acetaminophen (NORCO )  mg per tablet Take 1 tablet by mouth every 6 (six) hours as needed for pain. 112 tablet 0     No current facility-administered medications for this visit.        Family History: Nothing new    Physical Exam:  General Appearance:   She is at baseline.  As above, she perseverates and talks about a variety of topics that are not necessarily related to her medical care  Vitals:    09/06/17 1040   BP: 136/80   Patient Site: Left Arm   Patient Position: Sitting   Cuff Size: Adult Large   Pulse: 78   Weight: (!) 226 lb (102.5 kg)     Wt Readings from Last 3 Encounters:   09/06/17 (!) 226 lb (102.5 kg)   08/17/17 (!) 224 lb (101.6 kg)   08/09/17 (!) 222 lb 8 oz (100.9 kg)     Body mass index is 36.48 kg/(m^2).    Incision sites are healing well    Data Review:    Analysis and Summary of Old Records (2): Reviewed Dr. Linares's notes    Records Requested (1): 0      Other History Summarized (from other people in the room) (2): 0    Radiology Tests Summarized (XRAY/CT/MRI/DXA) (1): 0    Labs Reviewed (1): 0    Medicine Tests Reviewed (EKG/ECHO/COLONOSCOPY/EGD) (1): 0    Independent Review of EKG or X-RAY (2): 0

## 2021-06-12 NOTE — PROGRESS NOTES
Patient would like the video invitation sent by: Text to cell phone: [phone]       ASSESSMENT:  1. Anxiety state, unspecified  Severe generalized anxiety with exacerbation.  She is now off all serotonergic agents and antidepressants.  Dr. Dubois will be managing her psych meds.  Recommendation: Touch base with Dr. Silvino PONCE-upon his return for further direction.  Continue with psychologist    2. Sleep apnea, obstructive  Continue to work with sleep apnea equipment as able.  Encourage sanitation as she is doing    3. Severe major depression (H)  As above-clearly needs to be restarted on antidepressant/antianxiety medications.  Mood and irritability are escalated    4. Morbid obesity (H)  Will work with diet and exercise when patient is feeling better with regard to mental health issues    5. Pulmonary hypertension (H)  Following with pulmonary/cardiology    6. Hypertension due to endocrine disorder  Will need follow-up office visit for blood pressure check.    7. Chronic obstructive pulmonary disease, unspecified COPD type (H)  Stable but with frequent respiratory infections-question secondary to CPAP equipment    8. Malignant neoplasm of left female breast, unspecified estrogen receptor status, unspecified site of breast (H)  Noted    9. Type 2 diabetes mellitus without complication, without long-term current use of insulin (H)  Metformin.  Labs ordered.  She may get those Friday when she is at the [facility]:      PLAN:  There are no Patient Instructions on file for this visit.  No orders of the defined types were placed in this encounter.    No follow-ups on file.      CHIEF COMPLAINT:  Chief Complaint   Patient presents with     Medication Management     Wants to talk with pcp     Follow-up       HISTORY OF PRESENT ILLNESS:  Randi is a 65 y.o. female contacting the clinic today via video for discussion of current medical problems.  Of note, this was supposed to  "be a preoperative visit prior to having shoulder surgery.,  The patient is emotionally very unsteady currently.  She is very, going to discontinue or postpone her surgery.  She reports that she has been very emotionally labile.  She describes intense irritability and agitation.  Of note, she has been taken off of all of her antidepressants and antianxiety medications due to issues regarding \"serotonergic syndrome \".  It has been her goal to \"clean [place] \"and start a new with her antidepressant medications.  She will be corresponding with Dr. Dubois in the next day or so with next steps.  She is working with a psychologist and those notes are reviewed.    She states that she is very irritable and labile emotionally.  She is very short and irritated with her .    She had labs drawn as requested by endocrinology.  She was able to maintain CPAP for 1 week.  She states that although her sleeping was better, she had symptoms of a cold.  She believes that she is getting infections and inflammation from the CPAP equipment.  She is trying to work hard to sanitize it more thoroughly.    She will be going to the [place] with family member on Friday.  She would like lab order sent.    REVIEW OF SYSTEMS:   He is exceedingly labile and agitated.  All other systems are negative.    PFSH:  Social History     Social History Narrative     Not on file     She is .  She is a non-smoker.    TOBACCO USE:  Social History     Tobacco Use   Smoking Status Former Smoker     Quit date: 2003     Years since quittin.2   Smokeless Tobacco Never Used       VITALS:  There were no vitals filed for this visit.  Wt Readings from Last 3 Encounters:   10/02/20 (!) 254 lb 8 oz (115.4 kg)   20 (!) 258 lb (117 kg)   20 (!) 261 lb (118.4 kg)       PHYSICAL EXAM:  (observations via Video)  She is visualized via video.  She appears very agitated.  Speech is circumferential.      ADDITIONAL HISTORY SUMMARIZED (2): " Reviewed psychology notes/reviewed endocrinology notes  CARE EVERYWHERE/ EXTRA INFORMATION (1):   RADIOLOGY TESTS (1):   LABS (1): Reviewed labs and ordered labs  CARDIOLOGY/MEDICINE TESTS (1):   INDEPENDENT REVIEW (2 each):     Total data points: 5    Video Start time: 2:27 PM  Video End time: 2:47 PM    The visit lasted a total of 20 minutes     CA Intake Time: 10    I have reviewed and updated the patient's Past Medical History, Social History, Family History as appropriate.    MEDICATIONS: Reviewed and updated per CA and MD  Current Outpatient Medications   Medication Sig Dispense Refill     albuterol (PROAIR HFA;PROVENTIL HFA;VENTOLIN HFA) 90 mcg/actuation inhaler Inhale 2 puffs every 4 (four) hours as needed. 8.5 g 12     albuterol (PROVENTIL) 2.5 mg /3 mL (0.083 %) nebulizer solution Take 3 mL (2.5 mg total) by nebulization every 6 (six) hours as needed for wheezing. 75 mL 12     aspirin 81 MG EC tablet Take 81 mg by mouth daily.       buprenorphine (BUTRANS) 10 mcg/hour PTWK patch Place 1 patch on the skin every 7 days. 4 patch 0     cholecalciferol, vitamin D3, (VITAMIN D3) 5,000 unit Tab Take 10,000 Units by mouth daily.       cyanocobalamin, vitamin B-12, 5,000 mcg Subl Place 5,000 mcg under the tongue daily.       diazePAM (VALIUM) 2 MG tablet Take 2 mg by mouth at bedtime as needed.       diclofenac sodium (VOLTAREN) 1 % Gel Apply 2 g topically 4 (four) times a day. To shoulder 300 g 3     fluticasone-umeclidinium-vilanterol (TRELEGY ELLIPTA) 100-62.5-25 mcg DsDv inhaler Inhale 1 Inhalation daily. 3 each 3     furosemide (LASIX) 20 MG tablet Take 40 mg by mouth daily.  3     KLOR-CON M20 20 mEq tablet Take 1 tablet (20 mEq total) by mouth 2 (two) times a day. 180 tablet 3     levothyroxine (SYNTHROID, LEVOTHROID) 175 MCG tablet Take 1 tablet daily for 6 days of the week, 7th day PRN (Patient taking differently: 175 mcg daily. ) 14 tablet 0     losartan (COZAAR) 25 MG tablet Take 25 mg by mouth daily.    "    medical cannabis (Patient's own supply) by Other route see administration instructions. (The purpose of this order is to document that the patient reports taking medical cannabis. This is not a prescription, and is not used to certify that the patient has a  qualifying medical condition.)   THC- Red Tinsure-  Green/Yellow/ Red capsules  CBD sublingual spray       omeprazole (PRILOSEC) 20 MG capsule TAKE 1 CAPSULE BY MOUTH 2 TIMES A  capsule 2     Current Facility-Administered Medications   Medication Dose Route Frequency Provider Last Rate Last Dose     albuterol nebulizer solution 2.5 mg (PROVENTIL)  2.5 mg Inhalation Once Loida Stockton MD         cyanocobalamin injection 1,000 mcg  1,000 mcg Intramuscular Q30 Days Loida Stockton MD   1,000 mcg at 05/28/19 0912         The patient has been notified of following:     \"This video visit will be conducted via a call between you and your physician/provider. We have found that certain health care needs can be provided without the need for an in-person physical exam.  This service lets us provide the care you need with a video conversation.  If a prescription is necessary we can send it directly to your pharmacy.  If lab work is needed we can place an order for that and you can then stop by our lab to have the test done at a later time.    Video visits are billed at different rates depending on your insurance coverage. Please reach out to your insurance provider with any questions.    If during the course of the call the physician/provider feels a video visit is not appropriate, you will not be charged for this service.\"    Patient has given verbal consent to a Video visit? Yes  How would you like to obtain your AVS? AVS Preference: MyChart.  If dropped by the video visit, the video invitation should be sent to: Text to cell phone: [phone]  Will anyone else be joining your video visit? No     Video-Visit Details    Type of service:  Video " Visit    Originating Location (pt. Location): Home    Distant Location (provider location):  [facility]     Platform used for Video Visit: [facility]

## 2021-06-12 NOTE — PROGRESS NOTES
Subjective:   Randi Damon is a 62 y.o. female who presents for evaluation of pain. See rooming evaluation. Patient was last seen 7/31/17.     CC: Pain. Review of current status. Patient general opinion of symptoms management and function is fair    Major issues:  1. Chronic pain syndrome    2. Pain in right foot    3. Sacroiliitis          Patient Active Problem List   Diagnosis     Psoriatic Arthropathy     Osteopenia     Restless Legs Syndrome     Vitamin B12 Deficiency     Depression     Postablative hypothyroidism     Vitamin D Deficiency     Hemochromatosis     Impaired Fasting Glucose     History of breast cancer     Morbid Obesity     Incidental Adrenal Cortical Adenoma     Hypertension due to endocrine disorder     Esophageal Reflux     Psoriasis     Spinal Stenosis     Benign Adenomatous Polyp Of The Large Intestine     Joint Pain, Localized In The Hip     Anxiety state, unspecified     Sacroiliitis     Neck pain     Acute pain of right shoulder     COPD (chronic obstructive pulmonary disease)     Sleep apnea, obstructive     Pheochromocytoma, benign, left     Acute postoperative respiratory insufficiency     Anxiety     Hypoglycemia     Drug-induced constipation           HPI:   Impact of pain treatments:   Analgesia: fair   AE's: none   Aberrant behavior: none    Aggravating factors: worse at night  Alleviating factors: medication, pacing  DME: CPAP (She does think she is sleeping better)  Location/Laterality of the pain: neck and right arm, shoulder  Timing: constant  Quality: dull, aching, shooting  Severity:3/10    Activities Impaired by Increasing Pain Severity: F= 2  3-Enjoy  4-Work, Enjoy  5-Active, Mood Work Enjoy  6-Sleep, Active, Mood Work Enjoy  7-Walk, Sleep, Active, Mood Work Enjoy  8-Relate, Walk, Sleep, Active, Mood Work Enjoy      Medication:   Last opioid dose was Hydrocodone 09/07/2017 @ 0930 am; Morphine 09/6/2017 @ 1200 am.      Current Outpatient Prescriptions:      albuterol  (PROAIR HFA;PROVENTIL HFA;VENTOLIN HFA) 90 mcg/actuation inhaler, Inhale 2 puffs every 6 (six) hours as needed for wheezing., Disp: , Rfl:      albuterol (PROVENTIL) 2.5 mg /3 mL (0.083 %) nebulizer solution, Take 3 mL (2.5 mg total) by nebulization every 6 (six) hours as needed for wheezing., Disp: 75 mL, Rfl: 12     budesonide-formoterol (SYMBICORT) 160-4.5 mcg/actuation inhaler, Inhale 2 puffs 2 (two) times a day as needed. , Disp: , Rfl:      cyanocobalamin, vitamin B-12, 2,500 mcg Subl, Place under the tongue 2 (two) times a week., Disp: , Rfl:      diclofenac sodium (VOLTAREN) 1 % Gel, Apply 2 g topically 2 (two) times a day as needed (pain on shoulder and ankle)., Disp: , Rfl:      DULoxetine (CYMBALTA) 30 MG capsule, Take 1 capsule (30 mg total) by mouth 2 (two) times a day., Disp: 60 capsule, Rfl: 1     ergocalciferol (VITAMIN D2) 50,000 unit capsule, Take 50,000 Units by mouth 2 (two) times a week., Disp: , Rfl:      folic acid (FOLVITE) 1 MG tablet, Take 1 mg by mouth daily., Disp: , Rfl:      HYDROcodone-acetaminophen (NORCO )  mg per tablet, Take 1 tablet by mouth every 6 (six) hours as needed for pain., Disp: 112 tablet, Rfl: 0     levothyroxine (SYNTHROID, LEVOTHROID) 150 MCG tablet, Take 1 tablet (150 mcg total) by mouth every other day. Alternate with 175mcg tablets, Disp: 15 tablet, Rfl: 12     levothyroxine (SYNTHROID, LEVOTHROID) 175 MCG tablet, Take 1 tablet (175 mcg total) by mouth every other day. Alternate with 150mcg tablets, Disp: 15 tablet, Rfl: 12     LORazepam (ATIVAN) 1 MG tablet, Take 1 mg by mouth 2 (two) times a day as needed. , Disp: , Rfl: 0     morphine (MS CONTIN) 15 MG 12 hr tablet, Take 15 mg by mouth at bedtime as needed for pain., Disp: , Rfl: - she has 5 pills remaining.     omeprazole (PRILOSEC) 20 MG capsule, Take 20 mg by mouth daily., Disp: , Rfl:      polyethylene glycol (MIRALAX) 17 gram packet, Take 1 packet (17 g total) by mouth daily., Disp: 30 packet,  Rfl: 0     pramipexole (MIRAPEX) 1 MG tablet, Take 0.5-1 mg by mouth at bedtime as needed., Disp: , Rfl:      temazepam (RESTORIL) 7.5 MG capsule, , Disp: , Rfl:      triamcinolone (KENALOG) 0.1 % ointment, Apply topically 2 (two) times a day as needed., Disp: 30 g, Rfl: 1     VOLTAREN 1 % Gel, Apply a thin layer twice daily, Disp: 100 g, Rfl: 2     She reports she was given dilaudid and double the morphine and then she was sent home w/o any medication. She reports she increased the use of her chronic pain medication at a little higher dose. She has tapered back down. She is concerned about not being tapered down to her usual dose before being sent home.        Interventional: 8/2/17 Dr. Nichole and Dr. Linares LAPAROSCOPIC LEFT ADRENALECTOMY -  adrenalectomy for resection of pheochromocytoma. She thinks her mood and sweating is better. It appears the surgery outcome was quite positive.    Mental Health: Reports her mind is spinning and she is not able to sleep. She has an appt with Mabel Merida next week. She reprots duoxetine was increased. She thinks the jason worked a little better     Review of System  Constitutional- + activity intolerancea  Musculoskeletal- + pain  Neuro- - cognitive changesl  Psych-  + (improved since surgery) mood concerns,  - taking medication in a fashion other than prescribed    Social  Family History   Problem Relation Age of Onset     Heart disease Father      Obesity Father      Hemochromatosis Father      Obesity Mother      Arthritis Mother      Obesity Sister      Cardiovascular Sister      Hypertension Sister      Arthritis Sister      Hemochromatosis Sister      Lupus Sister      Scleroderma Sister      Cardiovascular Brother      Hypertension Brother      Arthritis Brother      Hemochromatosis Brother      Prostate cancer Brother      Cardiovascular Maternal Grandmother      Stroke Paternal Grandmother      Breast cancer Neg Hx      History   Smoking Status     Former Smoker      "Quit date: 8/1/2003   Smokeless Tobacco     Never Used     History   Alcohol Use No     History   Drug Use No     History   Sexual Activity     Sexual activity: Not on file       Objective:     Vitals:    09/07/17 1106   BP: 132/84   Pulse: 85   Resp: 14   Weight: (!) 226 lb (102.5 kg)   Height: 5' 6\" (1.676 m)   PainSc:   3       Constitutional:  Pleasant and cooperative female who presents alone today.   Psychiatric: Mood and affect are appropriate for the situation, setting and topic of discussion.  Patient does not appear sedated.  Integumentary:  Observed skin WNL  HEENT: EOM's grossly intact.    Chest: Breathing is non-labored.   Neurological:  Alert and oriented in all spheres including: time, place, person and situation.      Diagnostics:   Lab:  Last UDT on 11/10/16     Imaging:  No new imaging available to review    :  Dated 9/7/2017    Assessment:   Randi Damon is a 62 y.o. female seen in clinic today for chronic pain. She has been struggling with symptoms from an adrenal tumor surgery completed and she appears to feel she is getting benefit. to address her symptoms of her right shoulder have been placed on hold while it is being addressed.     *Universal Precautions:    UDS/Swab-SWAB 11/10/16.   Agreement- 5/17/17  Pharmacy- as documented    Count- n/a   Psychological evaluation 10/23/14  7/31/17 MME- 55  Pharmacogenetic testing- n/a  MTM: n/a    Management of opioid medication is inherently a moderate to high complex medial interaction based on the risk management required at each contact r/t risks and side effects.    Plan:   Plan/NextSteps:     Follow up: 4 months    Education:   Please note there are 2 Anne-Marie's at the [facility]. Today you saw Anne-Marie Cheunggins when communicating any needs please specify the last name. Thank you    Please call Monday-Friday for problems or questions and one of the clinical support staff (CSS) will help to get things figured out. The number is [phone]. Some folks are using [employer] to send an e-mail ([employer] message). Please remember some issues require an office visit.     Today we reviewed the plan of care and answered questions.      Health Maintenance: Please continue to see primary care for general medical prevention and illness care.    Rehabilitation: Ask if the [facility] offers craniosacral therapy    Interventional: we can consider injection lets see if PT can help first    Integrative Approaches: acupuncture ordered     Records: Reviewed to assist with preparation for the office visit and are reflected throughout the note.    Medication:   Medication prescribed today     Requested Prescriptions     Pending Prescriptions Disp Refills     HYDROcodone-acetaminophen (NORCO )  mg per tablet 112 tablet 0     Sig: Take 1 tablet by mouth every 6 (six) hours as needed for pain.     morphine (MS CONTIN) 15 MG 12 hr tablet 30 tablet 0     Sig: Take 1 tablet (15 mg total) by mouth at bedtime as needed for pain.       REFILL INSTRUCTIONS:  Please contact the clinic refill line 7 days before your refill is due. Speak clearly; note cell phones cut in-and-out and poor quality speech and reception issues will influence our ability to hear you and be efficient with your prescription.     Call [phone] leave:   Your name (first and last w/ spelling)   Date of birth  Name of all the medication(s) being requested  Dose of the medication(s)   How you are taking the medication (eg. twice per day etc).     Contact your pharmacy 3 (three) days after leaving your message to see if your prescription has been received. Please request the pharmacy check your profile to be certain about any concerns with a script failing to be received. Note: Chas updates have been inconsistent.  If the script has not been received there may have been a problem with the communication please reach back out to the clinic.       SAFETY REMINDERS  No alcohol while taking  controlled substances. Alcohol is not an illegal substance, it is unsafe to use in combination. It is a build up of substances in the body that can be extremely hazardous and may cause respirations to slow to a dangerous rate resulting in hospitalization, brain damage, or death.    Unintentional overdose and death.    People are not always in perfect health. Sometimes the body is weak or compromised because of an illness like the flu, pneumonia, low bood etc. When the body struggles, even though a person is taking their medication as prescribed, the medication may be too much for the body to handle. Combinations of medication can also put a person at high risk. In one study it was noted that 80% of unintentional overdoses occurred in people who were taking a combination of opioids and benzodiazepines.    A big concern would be if someone else got your medication. Your medication is not prescribed to anyone other than you. Your medication could cause harm or death to someone else.    Naloxone is a rescue medication. A person may need the rescue medication if experiencing an unexpected overdose. The medication is meant to give a person a break by lifting off one burden, the opioid narcotic medication. Medical care is required because the a persons body is compromised and needs further testing and assistance.    Symptoms of overdose include:   !breathing slow and shallow, erratic or not at all  !pinpoint pupils, hallucinations  !confusion  !muscle jerks, slack muscles   !extreme sleepiness or loss of alertness   !awake but not able to talk   !face pale or clammy, vomiting, for lighter skinned people, the skin tone turns bluish purple, for darker skinned people, it turns grayish or ashen   If in a situation where overdose is a concern engage the emergency response system (dial 911).    Do not sell, loan, borrow or share your opioid medication with anyone. Deaths have occurred as a result of this practice. It is illegal  and patients are being prosecuted.     Prevent unexpected access/loss of medication: Keep medication locked. Only carry what you need with you.      Patient Arrived @ 1053 for a 1100 appointment.     TT: 1122 - 1143  CT: over half spent in education and counseling as outlined in the plan.      Consuelo Little APRN FNP-BC  [address] [facility] [address]   [phone]  [phone]

## 2021-06-12 NOTE — TELEPHONE ENCOUNTER
----- Message from Loida Stockton MD sent at 10/28/2020  2:38 PM CDT -----  Can you fax my future lab orders in chart to [facility] lab

## 2021-06-12 NOTE — ANESTHESIA PROCEDURE NOTES
Arterial Line  Reason for Procedure: hemodynamic monitoring  Patient location during procedure: Pre-op  Start time: 8/2/2017 11:10 AM  End time: 8/2/2017 11:15 AM  Staffing:  Performing  Anesthesiologist: JAYY BELTRAN  Sterile Precautions:  sterile barriers used during insertion: cap, mask, sterile gloves, large sheet, and hand hygiene used.  Arterial Line:   Immediately prior to procedure a time out was called to verify the correct patient, procedure, equipment, support staff and site/side marked as required  Laterality: right  Location: radial  Prepped with: ChloroPrep    Needle gauge: 20 G  Number of Attempts: 1  Secured with: tape and transparent dressing  Flushed with: saline  1% lidocaine local anesthesia used for skin prep.   See MAR for additional medications given.

## 2021-06-12 NOTE — PROGRESS NOTES
Community Health Worker called and left a message for the patient.  If the patient is returning my call, please transfer the patient to [facility] at ext. [phone].   Patient has been mailed a unreachable letter and was provided with CHW contact information if they are interested in accessing Clinic Care Coordination.  Order for Care Management has been closed, no further outreach will be done at this time and patient can be re-referred.           Clinic Care Coordination Contact  [facility]       Clinical Data: Care Coordinator Outreach  Outreach attempted x 1. CHW spoke briefly to patients spouse today as patient was not available- Spouse requested CHW call back next week as patient has PCP follow up appt today 10/2/2020@12pm  Care Coordinator will try to reach patient again in 3-5 business days.  10/5/2020 offer phone visit with CCC RN/SW

## 2021-06-12 NOTE — PROGRESS NOTES
[facility] Hospital Discharge Follow Up Note    Assessment/Plan:  1. Hospital discharge follow-up/is post adrenalectomy for pheochromocytoma  Difficult hospital course complicated by many complaints about her experience at [facility].  Operative problems include COPD exacerbation and the patient  Emergency department last night for shortness of breath likely secondary to both COPD exacerbation and panic attack  Plans: We will proceed with a tapered dose of prednisone for wheezing.  Continue with home nebulizer treatments.  Doxycycline for possible pneumonia.  Will get records from [facility].  With regard to postoperative constipation: BM today.  Abdomen soft.  Continue with daily MiraLAX and increased hydration.  Follow-up office visit in 10 days    2. COPD (chronic obstructive pulmonary disease)  With exacerbation.  Possible pneumonia.  Awaiting records from [facility].  Recommendations: As above, tapered prednisone and course of doxycycline with reexamination in 10 days.    3.  Pheochromocytoma resection  Metoprolol tapered.  She is off all blood pressure medications.  Some lability of blood sugars in the hospital.  Encourage regular meals.    4.  Mental status issues  Patient's moods are very labile.  Will increase duloxetine to 30 mg twice daily.  She has Ativan for panic attacks      Time spent approximately 45 minutes with patient and her  today reviewing what to do for postoperative complications.  Patient would also like to speak to a patient advocate regarding her [facility] experience    Chief Complaint:  Chief Complaint   Patient presents with     Follow-up     ER       History of Present Illness:  Randi is a 61 y.o. female who is status post discharge from [facility] after undergoing adrenalectomy for resection of pheochromocytoma.  Of note, from a surgery point of view, things went fairly well.  She had a COPD exacerbation while hospitalized.  Additionally,  she had issues with postoperative constipation.  She was discharged and was quite unhappy about discharge pain plan.  She also had not had a bowel movement prior to her discharge from the hospital.  Because of this, she is also unhappy.  She believes her COPD exacerbation was secondary to the poor ventilation and increased heat at the hospital.    Nonetheless, she was very agitated upon returning home.  She contacted the nurse triage with complaints of shortness of breath and was directed to the emergency department.  She thought she could wait until today.  However, last evening, she did present to [facility] for reevaluation.  Of note, the records are not available for me this morning but we will work to get them.  She states she was told that she may have a pneumonia.  She was given a prescription for prednisone and doxycycline.  She was told also that she may be having a panic attack.  She believes that this could be the case.    She denies any fever, chills or abdominal pain.  She did manage to have a bowel movement this a.m.  It was formed enlarged.  She is feeling better.  She just started MiraLAX yesterday.  Pain control is going fairly well.  She was discharged from the hospital with Vicodin and told to follow with pain clinic.  Currently, she is using Vicodin and the morphine at her previous dose.  She states her pain is fairly well controlled.    Her  stresses frustration regarding her agitation and overall anxiety.    Review of Systems:  A comprehensive review of systems was performed and was otherwise negative    PFSH:  Social History: She is  with no children  History   Smoking Status     Former Smoker     Quit date: 8/1/2003   Smokeless Tobacco     Never Used       Past History: History of multiple medical problems that are noted in the snapshot    Educations are reviewed and reconciled  Current Outpatient Prescriptions   Medication Sig Dispense Refill     albuterol (PROAIR  HFA;PROVENTIL HFA;VENTOLIN HFA) 90 mcg/actuation inhaler Inhale 2 puffs every 6 (six) hours as needed for wheezing.       albuterol (PROVENTIL) 2.5 mg /3 mL (0.083 %) nebulizer solution Take 3 mL (2.5 mg total) by nebulization every 6 (six) hours as needed for wheezing. 75 mL 12     budesonide-formoterol (SYMBICORT) 160-4.5 mcg/actuation inhaler Inhale 2 puffs 2 (two) times a day as needed.        cyanocobalamin, vitamin B-12, 2,500 mcg Subl Place under the tongue 2 (two) times a week.       diclofenac sodium (VOLTAREN) 1 % Gel Apply 2 g topically 2 (two) times a day as needed (pain on shoulder and ankle).       DULoxetine (CYMBALTA) 30 MG capsule Take 1 capsule (30 mg total) by mouth 2 (two) times a day. 60 capsule 1     ergocalciferol (VITAMIN D2) 50,000 unit capsule Take 50,000 Units by mouth 2 (two) times a week.       folic acid (FOLVITE) 1 MG tablet Take 1 mg by mouth daily.       HYDROcodone-acetaminophen (NORCO )  mg per tablet Take 1 tablet by mouth every 6 (six) hours as needed for pain. 112 tablet 0     levothyroxine (SYNTHROID, LEVOTHROID) 150 MCG tablet Take 150 mcg by mouth every other day. Alternate with 175mcg tablets       levothyroxine (SYNTHROID, LEVOTHROID) 175 MCG tablet Take 175 mcg by mouth every other day. Alternate with 150mcg tablets       LORazepam (ATIVAN) 1 MG tablet Take 1 mg by mouth 2 (two) times a day as needed.   0     morphine (MS CONTIN) 15 MG 12 hr tablet Take 15 mg by mouth at bedtime as needed for pain.       omeprazole (PRILOSEC) 20 MG capsule Take 20 mg by mouth daily.       polyethylene glycol (MIRALAX) 17 gram packet Take 1 packet (17 g total) by mouth daily. 30 packet 0     pramipexole (MIRAPEX) 1 MG tablet Take 0.5-1 mg by mouth at bedtime as needed.       triamcinolone (KENALOG) 0.1 % ointment Apply topically 2 (two) times a day as needed.       VOLTAREN 1 % Gel Apply a thin layer twice daily 100 g 2     doxycycline (VIBRA-TABS) 100 MG tablet Take 1 tablet  (100 mg total) by mouth 2 (two) times a day for 7 days. 14 tablet 0     predniSONE (DELTASONE) 10 mg tablet Take 40mg by mouth daily for 3 days, then 30mg x 3 days, then 20mg x 3 days ,then 10mg x 3 days then stop 30 tablet 0     No current facility-administered medications for this visit.        Family History: Contributory    Physical Exam:  General Appearance:   Appears slightly agitated.  Her weight is down.  Pulse oximetry is 92% sitting.  With ambulation it drops to 89%  Vitals:    08/09/17 0944   BP: 98/62   Patient Site: Left Arm   Patient Position: Sitting   Cuff Size: Adult Large   Pulse: 66   Weight: (!) 222 lb 8 oz (100.9 kg)     Wt Readings from Last 3 Encounters:   08/09/17 (!) 222 lb 8 oz (100.9 kg)   08/04/17 (!) 234 lb 9.6 oz (106.4 kg)   08/01/17 (!) 230 lb (104.3 kg)     Body mass index is 35.91 kg/(m^2).    Head neck exam is negative for focal neuro deficits  Lung exam is without adventitious sounds.  However, there is a prolonged expiratory phase and wheezing with exhalation.  No rales are noted.  Cardiac exam reveals a regular rate and rhythm with no tachycardia  Abdomen is examined.  There are numerous oozing areas consistent with heparin injections.  The 4 laparoscopic sites are healing without difficulty.  The abdomen is not distended.  It is soft and nontender to palpation throughout  Extremities are examined and there is no calf edema    Data Review:    Analysis and Summary of Old Records (2): Reviewed hospital records in depth    Records Requested (1): Request [facility] records    Other History Summarized (from other people in the room) (2): Discussed case with     Radiology Tests Summarized (XRAY/CT/MRI/DXA) (1): 0    Labs Reviewed (1): Reviewed hospital labs    Medicine Tests Reviewed (EKG/ECHO/COLONOSCOPY/EGD) (1): 0    Independent Review of EKG or X-RAY (2): 0

## 2021-06-12 NOTE — PROGRESS NOTES
Preoperative Consultation    Randi Damon   61 y.o.  female    Date of visit: 7/25/2017    This is a preoperative consultation requested by Dr. Gab Linares in preparation for left adrenalectomy on August 2, 2017 at [facility], fax [phone].    Assessment and Plan:  Randi Damon was seen in preoperative consultation in preparation for adrenalectomy for adrenal mass.  This is a intermediate risk surgery and the patient has no increased risk for major cardiac complications based on exam and history and appears to be medically stable for the proposed surgery/procedure.    1. Preop exam for internal medicine  Randi is medically ready for left adrenalectomy.  Of note, she currently exhibits no symptoms of infectious diseases.  Her medical history is negative for untoward complications such as hyperthermia or anaphylaxis from general or local anesthetic agents.  Of note, however, she does have obstructive sleep apnea, COPD, morbid obesity and intermittent impaired fasting glucose.  There is no evidence of renal disease currently.  From a functional point of view, she is quite sedentary.    She is advised to withhold aspirin, nonsteroidal anti-inflammatory medications and nonessential vitamins for 7 days prior to surgery.  On the a.m. of surgery, she will take metoprolol and prazosin as directed.  She may take her thyroid medications with sips of water as well.  She should use her inhalers before presenting to the hospital.  Additionally, she needs to bring her sleep apnea equipment with her.  She is given written directions with regard to this.    - Comprehensive Metabolic Panel  - HM2(CBC w/o Differential)  - Urinalysis-UC if Indicated  Twelve-lead EKG from May 2017 is reviewed.  She has a right bundle branch block which is chronic.  No other abnormalities are noted    2. Adrenal mass  Left.-With features of pheochromocytoma.  Her case has not been straightforward, however, she has had  persistently elevated urine and serum metanephrines.  She will remain on metoprolol and prazosin until deemed medically stable by the hospital team.    3. Sleep apnea, obstructive  She should bring sleep apnea equipment with her to the hospital    4. COPD (chronic obstructive pulmonary disease)  Use inhalers preoperatively.    5. Morbid obesity  Encourage regular activity and reduced portions    6. Hemochromatosis  Quiet for the last several years.  Will update hemogram and ferritin.  - Ferritin  - Thyroid Stimulating Hormone (TSH)    7. Essential hypertension with goal blood pressure less than 140/90  Blood pressure stable    8. Chronic pain disorder  She is seen at the pain clinic for chronic back pain along with chronic foot pain.  She uses opioids on occasion.  She is instructed to withhold using opioids on the day of surgery        Patient Active Problem List   Diagnosis     Psoriatic Arthropathy     Osteopenia     Restless Legs Syndrome     Vitamin B12 Deficiency     Depression     Hypothyroidism     Vitamin D Deficiency     Hemochromatosis     Impaired Fasting Glucose     Breast Cancer     Morbid Obesity     Incidental Adrenal Cortical Adenoma     Hypertension     Esophageal Reflux     Psoriasis     Spinal Stenosis     Benign Adenomatous Polyp Of The Large Intestine     Joint Pain, Localized In The Hip     Anxiety state, unspecified     Sacroiliitis     Neck pain     Acute pain of right shoulder     COPD (chronic obstructive pulmonary disease)     Sleep apnea, obstructive       HPI:   Randi is here today for preoperative examination prior to having resection of the left adrenal gland.  Of note, she has had an enlarging adrenal mass with repeat elevations of both urine and serum metanephrines.  History is somewhat suggestive of pheochromocytoma.  Therefore, she is being managed as such.  She has been under the care and evaluation of Dr. Angela Camacho her endocrinologist.  She will follow-up with her  after surgery.    Currently, she denies any symptoms of infectious diseases such as cough, fever, chills or shortness of breath.  She has had recent foot surgery and notes some scar formation over the lateral foot incision on the right.  Additionally, she notes a taut right second toe.  She denies any urinary tract or gastrointestinal symptoms.    Pertinent anesthesia history is reviewed.  She has had both local and general anesthesia on multiple occasions in the past.  She denies any untoward reaction such as hyperthermia, anaphylaxis, shortness of breath or nausea and vomiting.  There is no family history of untoward reactions.  She has no personal history of DVT or bleeding disorder.  Her mother and sister had phlebitis with related lower extremity clotting.  She has no personal history of primary coronary artery disease or arrhythmia.  She does not have type 2 diabetes but has exhibited impaired fasting glucose on occasion.  She has chronic lung disease.  There is no evidence for chronic kidney disease.  With respect to functional activity, it is poor.      Review of systems:  A comprehensive review of systems was performed and was otherwise negative    Current Outpatient Prescriptions   Medication Sig Dispense Refill     albuterol (PROVENTIL) 2.5 mg /3 mL (0.083 %) nebulizer solution Take 3 mL (2.5 mg total) by nebulization every 6 (six) hours as needed for wheezing. 75 mL 12     budesonide-formoterol (SYMBICORT) 160-4.5 mcg/actuation inhaler Inhale 2 puffs 2 (two) times a day as needed.        cyanocobalamin, vitamin B-12, 2,500 mcg Subl Place under the tongue 2 (two) times a week.       DULoxetine (CYMBALTA) 30 MG capsule   1     folic acid (FOLVITE) 1 MG tablet TAKE 1 TABLET (1 MG) BY ORAL ROUTE ONCE DAILY 90 tablet 3     HYDROcodone-acetaminophen (NORCO )  mg per tablet Take 1 tablet by mouth every 6 (six) hours as needed for pain. 112 tablet 0     levothyroxine (SYNTHROID) 150 MCG tablet  Alternate one tablet with her other dose of 175 g.  Or take this tablet every other day. 15 tablet 11     levothyroxine (SYNTHROID, LEVOTHROID) 175 MCG tablet Every other day 20 tablet 11     LORazepam (ATIVAN) 1 MG tablet as needed.  0     metoprolol succinate (TOPROL-XL) 25 MG Take 1 tablet (25 mg total) by mouth daily. 25 tablet      omeprazole (PRILOSEC) 20 MG capsule TAKE ONE CAPSULE BY MOUTH TWO TIMES A  capsule 2     pramipexole (MIRAPEX) 1 MG tablet Take 0.5 - 1 tab daily PRN (Patient taking differently: 2 (two) times a day. Take 0.5 - 1 tab daily PRN) 90 tablet 3     prazosin (MINIPRESS) 1 MG capsule Take 2 capsules (2 mg total) by mouth 2 (two) times a day.       temazepam (RESTORIL) 15 mg capsule 7.5-15 mg as needed.   1     tiotropium (SPIRIVA) 18 mcg inhalation capsule Place 18 mcg into inhaler and inhale daily.       triamcinolone (KENALOG) 0.1 % ointment Thin layer bid 80 g 0     VITAMIN D2 50,000 unit capsule TAKE 1 CAPSULE (50,000 UNITS TOTAL) BY MOUTH 2 (TWO) TIMES A WEEK. 8 capsule 11     VOLTAREN 1 % Gel Apply a thin layer twice daily 100 g 2     morphine (MS CONTIN) 15 MG 12 hr tablet Take 1 tablet (15 mg total) by mouth at bedtime. PRN 30 tablet 0     No current facility-administered medications for this visit.        Allergies   Allergen Reactions     Cephalexin Other (See Comments)     Muscles aches,fatigue     Levofloxacin      Penicillins      Sulfa (Sulfonamide Antibiotics)        Recent antiplatelet use: no    Steroid use in the past year: Occasional short-term steroid use for COPD exacerbation.  No steroid use recently.    Past difficulty with anesthesia:  no    Personal or family history of difficulty with anesthesia: no    Current cardiopulmonary symptoms: no        Past Surgical History:   Procedure Laterality Date     BREAST BIOPSY       BREAST LUMPECTOMY Left 1994     AZ ARTHRODESIS,ANKLE,OPEN      Description: Ankle Arthrodesis;  Recorded: 04/07/2010;     AZ MASTECTOMY,  "MODIFIED RADICAL      Description: Modified Radical Mastectomy Left Breast;  Recorded: 2010;     MI REMOVE TONSILS/ADENOIDS,<13 Y/O      Description: Tonsillectomy With Adenoidectomy;  Recorded: 2010;     REDUCTION MAMMAPLASTY Right     approx late /[id number]     Family History   Problem Relation Age of Onset     Heart disease Father      Obesity Father           Cardiovascular Father       Heart disease     Other Father      Hemachromatosis     Obesity Mother           Arthritis Mother           Obesity Sister           Cardiovascular Sister           Hypertension Sister           Arthritis Sister           Other Sister      Lupus, scleraderma, Hemachromatosis     Cardiovascular Brother      Hypertension Brother      Arthritis Brother      Other Brother      Prostrate cancer, hemachromatosis     Cardiovascular Maternal Grandmother           Stroke Paternal Grandmother           Breast cancer Neg Hx      Social History   Substance Use Topics     Smoking status: Former Smoker     Smokeless tobacco: Never Used     Alcohol use No        Physical Exam:    General Appearance:   She appears at baseline.  She perseverates over many frustrating issues.    Vitals:    17 1136   BP: 110/68   Patient Site: Left Arm   Patient Position: Sitting   Cuff Size: Adult Large   Pulse: 82   SpO2: 94%   Weight: (!) 232 lb (105.2 kg)   Height: 5' 4.75\" (1.645 m)     Wt Readings from Last 3 Encounters:   17 (!) 232 lb (105.2 kg)   17 (!) 225 lb (102.1 kg)   17 (!) 232 lb 9.6 oz (105.5 kg)       EYES: Eyelids, conjunctiva, and sclera were normal. Pupils were normal. Cornea, iris, and lens were normal bilaterally.  HEAD, EARS, NOSE, MOUTH, AND THROAT: Head and face were normal. Hearing was normal to voice and the ears were normal to external exam. Nose appearance was normal and there was no discharge. Oropharynx was normal.  " TMs were normal.  NECK: Neck appearance was normal. There were no neck masses and the thyroid was not enlarged.  RESPIRATORY: Breathing pattern was normal and the chest moved symmetrically.   Lung sounds were equal bilaterally.  CARDIOVASCULAR: Heart rate and rhythm were normal.  S1 and S2 were normal and there were no extra sounds or murmurs. Peripheral pulses in arms and legs were normal.  Jugular venous pressure was normal.  There was no peripheral edema.  GASTROINTESTINAL: The abdomen was normal in contour.  Bowel sounds were present.   Palpation detected no tenderness, mass, or enlarged organs.   MUSCULOSKELETAL: Skeletal configuration was normal and muscle mass was normal for age. Joint appearance was overall normal.  LYMPHATIC: There were no enlarged nodes.  SKIN/HAIR/NAILS: Skin color was normal.  There were no abnormal skin lesions.  Hair and nails were normal.  NEUROLOGIC: The patient was alert and oriented to person, place, time, and circumstance. Speech was normal. Cranial nerves were normal. Motor strength was normal for age. The patient was normally coordinated.  PSYCHIATRIC:  Mood and affect were normal and the patient had normal recent and remote memory. The patient's judgment and insight were normal.    EKG done on May of this year.  Normal sinus rhythm is seen.  There is a right bundle branch block.  No other abnormalities are noted.  The right bundle branch block has been present for several years.  Results for orders placed or performed in visit on 07/12/17   Basic Metabolic Panel   Result Value Ref Range    Sodium 143 136 - 145 mmol/L    Potassium 4.2 3.5 - 5.0 mmol/L    Chloride 105 98 - 107 mmol/L    CO2 26 22 - 31 mmol/L    Anion Gap, Calculation 12 5 - 18 mmol/L    Glucose 86 70 - 125 mg/dL    Calcium 9.8 8.5 - 10.5 mg/dL    BUN 16 8 - 22 mg/dL    Creatinine 0.70 0.60 - 1.10 mg/dL    GFR MDRD Af Amer >60 >60 mL/min/1.73m2    GFR MDRD Non Af Amer >60 >60 mL/min/1.73m2     Loida Stockton,  MD  Internal Medicine  [facility]  Contact me at [phone]

## 2021-06-13 NOTE — TELEPHONE ENCOUNTER
Reason for Call:  Pt wants to talk to her care guides. She has upcoming appt on  12/29 but feels she can't wait until then to talk to [facility] RN. She also wants [facility] RN to take a look at her [employer] message from today. I offer her triage line but pt declined.      Detailed comments: none    Phone Number Patient can be reached at:   Cell number on file:    Telephone Information:   Mobile [phone]       Best Time: anytime    Can we leave a detailed message on this number?: Yes    Call taken on 12/17/2020 at 1:41 PM by Trell Mackenzie

## 2021-06-13 NOTE — PROGRESS NOTES
"Mental Health Visit Note    10/4/2017   Start time: 1000    Stop Time: 1100   Session # 12    Randi Damon is a 62 y.o. female is being seen today for    Chief Complaint   Patient presents with     Mental Health visit   .     New symptoms or complaints: None    Functional Impairment:   Personal: 4  Family: 3  Work: 4  Social:4    Clinical assessment of mental status: Patient presented alert and oriented x3.  She was well-groomed.  Her attire was appropriate.  Patient was cooperative.  Patient motor actively was normal and eye contact appropriate.  Patients mood was anxious and affect congruent.  Patient s speech and language was normal.  Patient attention and thought process normal.  Concentration was appropriate.  Patient denied delusions or hallucinations. Patient denied suicidal or homicidal ideation.  Patient denied any long or short term memory problems. No evidence of impairment in judgment.   She has insight and fund of knowledge was adequate.        Suicidal/Homicidal Ideation present: None Reported This Session    Patient's impression of their current status: Pt reports \"I think I need to start talking about my past and family history\"     Therapist impression of patients current state: Pt began sharing family history, relationship with mom/dad/sister and brother.  Discussed the multiple changes and traumatic experiences throughout her life and possible impact on her currently mental health.  She began discussing how she met her  and the development of their relationship.      Type of psychotherapeutic technique provided: Insight oriented, Client centered, Solution-focused and CBT    Progress toward short term goals:Progress as expected, trauma & family hx    Review of long term goals: Date of last review 7/31/2017    Diagnosis:   1. Bipolar affective disorder, currently depressed, moderate    2. Chronic pain syndrome        Plan and Follow up: Follow up with Mabel in 2 weeks  Practice self-care " daily as discussed  Follow up with Providers as scheduled.       Discharge Criteria/Planning: Patient will continue with follow-up until therapy can be discontinued without return of signs and symptoms.    Shanice Merida 10/13/2017      This note was created with help of Dragon dictation software. Grammatical / typing errors are not intentional and inherent to the software.

## 2021-06-13 NOTE — TELEPHONE ENCOUNTER
Patient states she is having problems with restless legs syndrome.    She reports she has not slept in 3 nights, and states she cannot take this anymore.    She asked to get a virtual appointment  With Dr. Stockton., and saige Guerrero not available at this time.    Patient states she will call her pain clinic pcp.  Shanice Schmidt RN  [facility] Triage/refill nurse        Reason for Disposition    Patient wants to be seen    MODERATE pain (e.g., interferes with normal activities, limping) and present > 3 days    Protocols used: LEG PAIN-A-OH

## 2021-06-13 NOTE — TELEPHONE ENCOUNTER
See pt's calls, reports not tolerate lithium two tabs made restless legs, still tearful, irritable.    Today wonders if mood changes relate to butrans patch    Plan leave 10 mcg patch on for 12-14 days, will allow a taper, if having withdrawal symptoms will call    Low-dose depakote  250 mg bedtime, after five nights may increase to two bedtime

## 2021-06-13 NOTE — TELEPHONE ENCOUNTER
Pt called back, very upset crying and yelling.  Does not want to schedule with Damian, states tried that and not doing it again.  Wants to speak with Dr. Dubois.  GIN Salgado is booked out into December as well.
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
## 2021-06-13 NOTE — PROGRESS NOTES
Mental Health Visit Note    9/29/017  Start time: 900    Stop Time: 1000   Session # 11    Randi Damon is a 62 y.o. female is being seen today for    Chief Complaint   Patient presents with     Mental Health Visit   .     New symptoms or complaints: None    Functional Impairment:   Personal: 4  Family: 3  Work: NA  Social:4    Clinical assessment of mental status: Patient presented alert and oriented x3.  She was well-groomed.  Her attire was appropriate.  Patient was cooperative.  Patient motor actively was normal and eye contact appropriate.  Patients mood was anxious and affect congruent.  Patient s speech and language was normal.  Patient attention and thought process normal.  Concentration was appropriate.  Patient denied delusions or hallucinations. Patient denied suicidal or homicidal ideation.  Patient denied any long or short term memory problems. No evidence of impairment in judgment.   She has insight and fund of knowledge was adequate.        Suicidal/Homicidal Ideation present: None Reported This Session    Patient's impression of their current status: Pt reports ongoing symptoms of depression, anxiety and chronic pain.      Therapist impression of patients current state: Pt was last seen on 7/31/2017. She processed her surgery since our last visit.  Discussed improvements and continued areas of concerns.  Pt reports that she feels the mood swings have improved, but continues to have other symptoms that impact daily functioning.  She discussed experience and ongoing concerns about her health/chronic pain.  Processed additional psychosocial stressors related to her family,  and financial concerns.      Type of psychotherapeutic technique provided: Insight oriented, Client centered, Solution-focused and CBT    Progress toward short term goals:Progress as expected, self-care, coping skill, stress management    Review of long term goals: Date of last review 7/31/2017    Diagnosis:   1. Bipolar  affective disorder, currently depressed, moderate    2. Chronic pain syndrome        Plan and Follow up: Follow up with Mabel in 1 week  Follow up with Providers as scheduled  Practice self-care daily       Discharge Criteria/Planning: Patient will continue with follow-up until therapy can be discontinued without return of signs and symptoms.    Shanice Merida 10/6/2017      This note was created with help of Dragon dictation software. Grammatical / typing errors are not intentional and inherent to the software.

## 2021-06-13 NOTE — TELEPHONE ENCOUNTER
Patient calls x2, she has multiple concerns.  Main concern is restless leg syndrome, patient reports reaching out to her primary on this but that her primary is out of the office.  She also adds that she is no longer taking valium as this made her irritable and she is continuing on the iron therapy through hematologist.  Will forward this request to provider in this clinic as an FYI and to primary care to have a covering provider address RLS issues.  
30

## 2021-06-13 NOTE — PROGRESS NOTES
"Patient would like the video invitation sent by: Text to cell phone: [phone]       ASSESSMENT:  1. Chronic obstructive pulmonary disease, unspecified COPD type (H)  Continues with a cough-improved with filtration in the home    2. Osteopenia  Ordered  - DXA Bone Density Scan; Future    3. Type 2 diabetes mellitus without complication, without long-term current use of insulin (H)  Due for bone density.  Glycohemoglobin order placed for 1 month  - Glycosylated Hemoglobin A1c; Future    4. Severe episode of recurrent major depressive disorder, without psychotic features (H)  Continues with major depressive disorder.  Thoughts very scattered today.  Tearful.  Very anxious and irritable.  Recommendation: She thinks she is better off without medications, however, have reinforced the need to have careful and close follow-up with the psychiatric team.  She is working with Dr. Dubois on her psych meds.    5. Morbid Obesity  Continue to encourage walks and exercise    6. Chronic intractable pain  Working with pain clinic    7. Hereditary hemochromatosis (H)  Oncology concern regarding normalization of iron levels/liver iron/ferritin, etc.  Worried about occult blood loss.  Will order colonoscopy  - Ambulatory referral for Colonoscopy    8. Need for tetanus booster  Ordered  - Td, Preservative Free (green label); Future    9. Need for pneumococcal vaccination  Ordered  - Pneumococcal polysaccharide vaccine 23-valent 3 yo or older, subq/IM; Future    10. Acquired hypothyroidism  Low TSH.  Working with endocrinology.  Recent dose change.  Would like to recheck this in 1 month.  My recommendation is that of endocrinology does not think that a pheochromocytoma is an issue, she should step out of care with endocrinology and return here for management of thyroid.  - Thyroid Cascade; Future    11. Intestinal polyps    - Ambulatory referral for Colonoscopy    12. Postablative hypothyroidism  Overall, she has multiple \"cooks in " "the kitchen \".  Multiple specialist.  She is overwhelmed with all the appointments and care.  Encourage some cephalization of her chart.  She will continue with her oncologist/psychiatrist/pain specialist/psychotherapist and cardiologist.        Preventive Health Care: Preventative care orders placed she will complete at her convenience    PLAN:  There are no Patient Instructions on file for this visit.  No orders of the defined types were placed in this encounter.    No follow-ups on file.      CHIEF COMPLAINT:  Chief Complaint   Patient presents with     Medication Management     Test Results       HISTORY OF PRESENT ILLNESS:  Randi is a 65 y.o. female contacting the clinic today via video for discussion of her multiple medical problems and to review many of her test results that were ordered by other physicians.  These were reviewed with her from care everywhere and the chart.    She had a liver iron study that is normal.  All the iron studies/urine her metanephrine/etc. were reviewed.    She continues to be very emotional and irritable.  She continues to focus on the serotonin syndrome that plagued her in the past.  She is worried that her pain medicine patch has serotonin in it and is currently being weaned.  Dr. Dubois is currently working with her psychiatric medications as well as her pain medications.  This is her most active problem currently.    She is coughing during the interview but states that her COPD is under much better control with a HEPA filter in their home.    She is trying to be compliant with her sleep machine.    As above, all labs are reviewed    REVIEW OF SYSTEMS:     All other systems are negative.    PFSH:  Social History     Social History Narrative     Not on file      with no children.  Non-smoker    TOBACCO USE:  Social History     Tobacco Use   Smoking Status Former Smoker     Quit date: 2003     Years since quittin.3   Smokeless Tobacco Never Used "       VITALS:  There were no vitals filed for this visit.  Wt Readings from Last 3 Encounters:   10/02/20 (!) 254 lb 8 oz (115.4 kg)   09/18/20 (!) 258 lb (117 kg)   09/04/20 (!) 261 lb (118.4 kg)       PHYSICAL EXAM:  (observations via Video)  He is tearful and perseverates      ADDITIONAL HISTORY SUMMARIZED (2): Reviewed care everywhere/MyChart messages  CARE EVERYWHERE/ EXTRA INFORMATION (1):   RADIOLOGY TESTS (1): Viewed liver MRI for liver iron  LABS (1): Reviewed labs  CARDIOLOGY/MEDICINE TESTS (1):   INDEPENDENT REVIEW (2 each):     Total data points:     Video Start time: 9:23 AM  Video End time: 9:54 AM    The visit lasted a total of 31 minutes     CA Intake Time: 10 min    I have reviewed and updated the patient's Past Medical History, Social History, Family History as appropriate.    MEDICATIONS: Reviewed and updated per CA and MD  Current Outpatient Medications   Medication Sig Dispense Refill     albuterol (PROAIR HFA;PROVENTIL HFA;VENTOLIN HFA) 90 mcg/actuation inhaler Inhale 2 puffs every 4 (four) hours as needed. 8.5 g 12     albuterol (PROVENTIL) 2.5 mg /3 mL (0.083 %) nebulizer solution Take 3 mL (2.5 mg total) by nebulization every 6 (six) hours as needed for wheezing. 75 mL 12     aspirin 81 MG EC tablet Take 81 mg by mouth daily.       cholecalciferol, vitamin D3, (VITAMIN D3) 5,000 unit Tab Take 10,000 Units by mouth daily.       cyanocobalamin, vitamin B-12, 5,000 mcg Subl Place 5,000 mcg under the tongue daily.       diazePAM (VALIUM) 2 MG tablet Take 2 mg by mouth at bedtime as needed.       divalproex (DEPAKOTE DR) 250 MG 12 hour tablet One bedtime for five nights, may increase to two bedtime 30 tablet 1     fluticasone-umeclidinium-vilanterol (TRELEGY ELLIPTA) 100-62.5-25 mcg DsDv inhaler Inhale 1 Inhalation daily. 3 each 3     furosemide (LASIX) 20 MG tablet Take 2 tablets (40 mg total) by mouth daily. 180 tablet 3     KLOR-CON M20 20 mEq tablet Take 1 tablet (20 mEq total) by mouth 2  "(two) times a day. 180 tablet 3     levothyroxine (SYNTHROID, LEVOTHROID) 175 MCG tablet Take 1 tablet daily for 6 days of the week, 7th day PRN (Patient taking differently: 175 mcg daily. ) 14 tablet 0     losartan (COZAAR) 25 MG tablet Take 25 mg by mouth daily.       medical cannabis (Patient's own supply) by Other route see administration instructions. (The purpose of this order is to document that the patient reports taking medical cannabis. This is not a prescription, and is not used to certify that the patient has a  qualifying medical condition.)   THC- Red Tinsure-  Green/Yellow/ Red capsules  CBD sublingual spray       omeprazole (PRILOSEC) 20 MG capsule TAKE 1 CAPSULE BY MOUTH 2 TIMES A  capsule 2     buprenorphine (BUTRANS) 10 mcg/hour PTWK patch Place 1 patch on the skin every 7 days. 4 patch 0     furosemide (LASIX) 20 MG tablet Take 40 mg by mouth daily.  3     lithium 150 MG capsule Take 1 capsule (150 mg total) by mouth 3 (three) times a day with meals. 90 capsule 2     Current Facility-Administered Medications   Medication Dose Route Frequency Provider Last Rate Last Admin     albuterol nebulizer solution 2.5 mg (PROVENTIL)  2.5 mg Inhalation Once Loida Stockton MD         cyanocobalamin injection 1,000 mcg  1,000 mcg Intramuscular Q30 Days Loida Stockton MD   1,000 mcg at 05/28/19 0912         The patient has been notified of following:     \"This video visit will be conducted via a call between you and your physician/provider. We have found that certain health care needs can be provided without the need for an in-person physical exam.  This service lets us provide the care you need with a video conversation.  If a prescription is necessary we can send it directly to your pharmacy.  If lab work is needed we can place an order for that and you can then stop by our lab to have the test done at a later time.    Video visits are billed at different rates depending on your insurance " "coverage. Please reach out to your insurance provider with any questions.    If during the course of the call the physician/provider feels a video visit is not appropriate, you will not be charged for this service.\"    Patient has given verbal consent to a Video visit? Yes  How would you like to obtain your AVS? AVS Preference: Mail a copy.  If dropped by the video visit, the video invitation should be sent to:   Will anyone else be joining your video visit? No     Video-Visit Details    Type of service:  Video Visit    Originating Location (pt. Location): Home    Distant Location (provider location):  [facility]     Platform used for Video Visit: Maico Wheat LPN     "

## 2021-06-13 NOTE — PROGRESS NOTES
Patient has left several messages and sent my chart messages this week.  Earlier in the week she reported she was doing well.  Yesterday communication that she was not sleeping well a lot restless leg syndrome.  She thought Mirapex made things worse.  I responded that she should try magnesium supplementation Epsom salts baths.  Inquired about the use of gabapentin.    Received a call that the patient presented to the [facility] emergency room in [place],  [place], [phone].  The patient acknowledges not had recommended if she was decompensated felt the need of hospital to go to [facility] emergency room and could not account for why she went there.  Reported significant agitation.    I reviewed with [place] the patient has had medical problems including a pheochromocytoma, recent work-up indicated if not active.  She had an episode of serotonin syndrome this summer.  Her psychiatrist was no longer available.  She also had a goal to taper off opioids this summer.  We had been discussing a number of medications to try to help stabilize her concerns about anger and irritation including lithium and Depakote.  There was some question whether she was bipolar.  [place] indicates there are no mental health beds available presently in the [facility], in part due to Covid.  In the emergency room they did give her some Zyprexa.  She was not felt to be imminently suicidal was unclear what the disposition could be.

## 2021-06-13 NOTE — PROGRESS NOTES
"Left message to discuss course.  Describes tried Wellbutrin for 3 days, described feeling \"hot flashes.  Also described with Depakote having a sense of some itching of the skin that was worse with the Wellbutrin.  She described having some pressure upper chest and stop this.    Restless legs it continue to be a problem the last few nights.  She stopped this since she had her episode of serotonin syndrome on August 28 and when her psychiatrist increased her Pristiq and BuSpar with the Mirapex.  We discussed the Mirapex being more dopaminergic may have had less of an impact of the other 2 agents and given that the restless leg syndrome is such a challenge she could add back perhaps a lower dose at bedtime.  Has also been using Diazapa, 2 mg sometimes 3 times a day.    She has been using different combinations of medical cannabis to try to help with her symptoms.    She has her present plan to have session with Dallas Samson her therapist on Wednesday bringing her .  She wishes to discuss her overall status.  She wonders how much her condition may relate to thyroid concerns.  She has had to have radioactive treatment twice.  We reviewed the records show presently her T4 and T3 are normal, the reverse T3 pending, her TSH is low showing that she may be a bit overcorrected but that the T4 is not elevated.  She has been working with endocrinologist at the [facility], met with her primary care provider.    She notes it has been 15 days since the patch, for the first time in 20 years off opioids and also finding her body is reorganizing.    The plan developed is to add back some Mirapex, was taking 1 mg before will use 0.5.  I will renew her diazepam that she is taking 2 mg usually at bedtime up to 3 times a day.  She will meet with her therapist this week to determine next steps.  "

## 2021-06-13 NOTE — PROGRESS NOTES
Subjective:   Randi Damon is a 62 y.o. female who presents for evaluation of pain. See rooming evaluation. Patient was last seen 9/7/17.     CC: Pain.     Major issues:  1. Neck pain    2. Chronic pain syndrome    3. Sacroiliitis          Patient Active Problem List   Diagnosis     Psoriatic Arthropathy     Osteopenia     Restless Legs Syndrome     Vitamin B12 Deficiency     Depression     Postablative hypothyroidism     Vitamin D Deficiency     Hemochromatosis     Impaired Fasting Glucose     History of breast cancer     Morbid Obesity     Incidental Adrenal Cortical Adenoma     Hypertension due to endocrine disorder     Esophageal Reflux     Psoriasis     Spinal Stenosis     Benign Adenomatous Polyp Of The Large Intestine     Joint Pain, Localized In The Hip     Anxiety state, unspecified     Sacroiliitis     Neck pain     Acute pain of right shoulder     COPD (chronic obstructive pulmonary disease)     Sleep apnea, obstructive     Pheochromocytoma, benign, left     Acute postoperative respiratory insufficiency     Anxiety     Hypoglycemia     Drug-induced constipation     HPI:   Impact of pain treatments:   ADL's: Grooming: Able to bath, dress and eat w/o assistance.  Social: She has a vacation coming November 19th. She is headed to [place]   Aberrant behavior: none      DME: CPAP is pulling hair out  Location/Laterality of the pain: right Shoulder, neck pain  Timing: constant  Quality: dull, aching  Severity:5/10    Activities Impaired by Increasing Pain Severity: F= 6  3-Enjoy  4-Work, Enjoy  5-Active, Mood Work Enjoy  6-Sleep, Active, Mood Work Enjoy  7-Walk, Sleep, Active, Mood Work Enjoy  8-Relate, Walk, Sleep, Active, Mood Work Enjoy      Medication:   Last opioid dose was Hydrocodon  10/30/17 @ 1045 am, Morphine 15 mg 10/28/17 before bedtime      Current Outpatient Prescriptions:      albuterol (PROAIR HFA;PROVENTIL HFA;VENTOLIN HFA) 90 mcg/actuation inhaler, Inhale 2 puffs every 6 (six) hours as  needed for wheezing., Disp: , Rfl:      albuterol (PROVENTIL) 2.5 mg /3 mL (0.083 %) nebulizer solution, Take 3 mL (2.5 mg total) by nebulization every 6 (six) hours as needed for wheezing., Disp: 75 mL, Rfl: 12     budesonide-formoterol (SYMBICORT) 160-4.5 mcg/actuation inhaler, Inhale 2 puffs 2 (two) times a day as needed. , Disp: , Rfl:      cyanocobalamin, vitamin B-12, 2,500 mcg Subl, Place under the tongue 2 (two) times a week., Disp: , Rfl:      diclofenac sodium (VOLTAREN) 1 % Gel, Apply 2 g topically 2 (two) times a day as needed (pain on shoulder and ankle)., Disp: , Rfl:      DULoxetine (CYMBALTA) 30 MG capsule, Take 1 capsule (30 mg total) by mouth 2 (two) times a day., Disp: 60 capsule, Rfl: 1     ergocalciferol (VITAMIN D2) 50,000 unit capsule, Take 50,000 Units by mouth 2 (two) times a week., Disp: , Rfl:      folic acid (FOLVITE) 1 MG tablet, TAKE 1 TABLET (1 MG) BY ORAL ROUTE ONCE DAILY, Disp: 90 tablet, Rfl: 2     HYDROcodone-acetaminophen (NORCO )  mg per tablet, Take 1 tablet by mouth every 6 (six) hours as needed for pain., Disp: 112 tablet, Rfl: 0     levothyroxine (SYNTHROID, LEVOTHROID) 150 MCG tablet, Take 1 tablet (150 mcg total) by mouth every other day. Alternate with 175mcg tablets, Disp: 15 tablet, Rfl: 12     levothyroxine (SYNTHROID, LEVOTHROID) 175 MCG tablet, Take 1 tablet (175 mcg total) by mouth every other day. Alternate with 150mcg tablets, Disp: 15 tablet, Rfl: 12     LORazepam (ATIVAN) 1 MG tablet, Take 1 mg by mouth 2 (two) times a day as needed. , Disp: , Rfl: 0     morphine (MS CONTIN) 15 MG 12 hr tablet, Take 1 tablet (15 mg total) by mouth at bedtime as needed for pain., Disp: 30 tablet, Rfl: 0     omeprazole (PRILOSEC) 20 MG capsule, Take 20 mg by mouth daily., Disp: , Rfl:      polyethylene glycol (MIRALAX) 17 gram packet, Take 1 packet (17 g total) by mouth daily., Disp: 30 packet, Rfl: 0     pramipexole (MIRAPEX) 1 MG tablet, Take 0.5-1 mg by mouth at  "bedtime as needed., Disp: , Rfl:      temazepam (RESTORIL) 7.5 MG capsule, , Disp: , Rfl:      triamcinolone (KENALOG) 0.1 % ointment, Apply topically 2 (two) times a day as needed., Disp: 30 g, Rfl: 1     VOLTAREN 1 % Gel, Apply a thin layer twice daily, Disp: 100 g, Rfl: 2     Interventional:   11/14/16 CERVICAL FACET INJECTION WITH FLUOROSCOPIC GUIDANCE,  C4-C5 and C5-C6 : right  10/30/17 She feels like this is wearing of and she would like to repeat. Has been very helpful this last year.    Rehabilitation: She has not kerry exercising in the same fashion.    Mental Health: Difficulty with mood. She is not currently using the SAD light. She has not looked into her hormones.     Additional Problems:   She has no energy. She has not seen Dr. Graham     Review of Systems   Constitutional- + activity intolerance  Musculoskeletal- + pain  Neuro- - cognitive changes  Psych-  + mood concerns,  - taking medication in a fashion other than prescribed    Social  Family History   Problem Relation Age of Onset     Heart disease Father      Obesity Father      Hemochromatosis Father      Obesity Mother      Arthritis Mother      Obesity Sister      Cardiovascular Sister      Hypertension Sister      Arthritis Sister      Hemochromatosis Sister      Lupus Sister      Scleroderma Sister      Cardiovascular Brother      Hypertension Brother      Arthritis Brother      Hemochromatosis Brother      Prostate cancer Brother      Cardiovascular Maternal Grandmother      Stroke Paternal Grandmother      Breast cancer Neg Hx      History   Smoking Status     Former Smoker     Quit date: 8/1/2003   Smokeless Tobacco     Never Used     History   Alcohol Use No     History   Drug Use No     History   Sexual Activity     Sexual activity: Not on file       Objective:     Vitals:    10/30/17 1113   BP: 162/89   Pulse: 86   Resp: 14   Weight: (!) 226 lb (102.5 kg)   Height: 5' 6\" (1.676 m)   PainSc:   5       Constitutional:  Pleasant and " cooperative female who presents alone today.   Psychiatric: Mood and affect are appropriate for the situation, setting and topic of discussion.  Patient does not appear sedated.  Integumentary:  Observed skin WNL  HEENT: EOM's grossly intact.    Chest: Breathing is non-labored.   Neurological:  Alert and oriented in all spheres including: time, place, person and situation.      Diagnostics:   Lab:  Last UDT on 11/10/16      Imaging:  No new imaging available to review    :  Dated 10/30/2017    Assessment:   Randi Damon is a 62 y.o. female seen in clinic today for chronic pain. She has been struggling with symptoms from an adrenal tumor surgery completed. She is struggling a bit with energy, motivation and mood and has not yet been seen in follow up.     Discussion related to sexuality and sexual expression in the face of age, health and developmental changes    *Universal Precautions:    UDS/Swab-SWAB 11/10/16.   Agreement- 5/17/17  Pharmacy- as documented    Count- n/a   Psychological evaluation 10/23/14  7/31/17 MME- 53  Pharmacogenetic testing- n/a  MTM: n/a    The patient has chronic pain and is being sustained on a ER/LA opioid for pain symptoms severe enough to warrant around the clock care with an opioid medication.    Management of opioid medication is inherently a moderate to high complex medial interaction based on the risk management required at each contact r/t risks and side effects.    Plan:   Plan/NextSteps:     Follow up: 1 month    Education:   Please note there are 2 Anne-Marie's at the [facility]. Today you saw Anne-Marie Little when communicating any needs please specify the last name. Thank you    Please call Monday-Friday for problems or questions and one of the clinical support staff (CSS) will help to get things figured out. The number is [phone]. Some folks are using [employer] to send an e-mail ([employer] message). Please remember some issues require an office visit.      Today we reviewed the plan of care and answered questions.      Health Maintenance: Please continue to see primary care for general medical prevention and illness care.    Mental Health: Continue with Mabel. Start using the SAD light 20 minutes before noon.    Rehabilitation: You are intending to get back to the exercises    Interventional: I ordered the injection    Records: Reviewed to assist with preparation for the office visit and are reflected throughout the note.    Medication:   Medication prescribed today      Requested Prescriptions     Pending Prescriptions Disp Refills     HYDROcodone-acetaminophen (NORCO )  mg per tablet 10/30-11/27 112 tablet 0     Sig: Take 1 tablet by mouth every 6 (six) hours as needed for pain.     morphine (MS CONTIN) 15 MG 12 hr tablet 10/30-11/27 30 tablet 0     Sig: Take 1 tablet (15 mg total) by mouth at bedtime as needed for pain.       REFILL INSTRUCTIONS:  Please contact the clinic refill line 7 days before your refill is due. Speak clearly; note cell phones cut in-and-out and poor quality speech and reception issues will influence our ability to hear you and be efficient with your prescription.     Call [phone] leave:   Your name (first and last w/ spelling)   Date of birth  Name of all the medication(s) being requested  Dose of the medication(s)   How you are taking the medication (eg. twice per day etc).     Contact your pharmacy 3 (three) days after leaving your message to see if your prescription has been received. Please request the pharmacy check your profile to be certain about any concerns with a script failing to be received. Note: Chas updates have been inconsistent.  If the script has not been received there may have been a problem with the communication please reach back out to the clinic.       SAFETY REMINDERS  No alcohol while taking controlled substances. Alcohol is not an illegal substance, it is unsafe to use in combination. It is a  build up of substances in the body that can be extremely hazardous and may cause respirations to slow to a dangerous rate resulting in hospitalization, brain damage, or death.    Unintentional overdose and death.    People are not always in perfect health. Sometimes the body is weak or compromised because of an illness like the flu, pneumonia, low bood etc. When the body struggles, even though a person is taking their medication as prescribed, the medication may be too much for the body to handle. Combinations of medication can also put a person at high risk. In one study it was noted that 80% of unintentional overdoses occurred in people who were taking a combination of opioids and benzodiazepines.    A big concern would be if someone else got your medication. Your medication is not prescribed to anyone other than you. Your medication could cause harm or death to someone else.    Naloxone is a rescue medication. A person may need the rescue medication if experiencing an unexpected overdose. The medication is meant to give a person a break by lifting off one burden, the opioid narcotic medication. Medical care is required because the a persons body is compromised and needs further testing and assistance.    Symptoms of overdose include:   !breathing slow and shallow, erratic or not at all  !pinpoint pupils, hallucinations  !confusion  !muscle jerks, slack muscles   !extreme sleepiness or loss of alertness   !awake but not able to talk   !face pale or clammy, vomiting, for lighter skinned people, the skin tone turns bluish purple, for darker skinned people, it turns grayish or ashen   If in a situation where overdose is a concern engage the emergency response system (dial 911).    Do not sell, loan, borrow or share your opioid medication with anyone. Deaths have occurred as a result of this practice. It is illegal and patients are being prosecuted.     Prevent unexpected access/loss of medication: Keep medication  locked. Only carry what you need with you.      Patient Arrived @ [facility] for a 1040 appointment.     TT:  - [phone]  CT: over half spent in education and counseling as outlined in the plan.    Consuelo Little APRN FNP-BC  [address] [facility] [address]   [phone]  [phone]

## 2021-06-13 NOTE — PROGRESS NOTES
Mental Health Visit Note    10/19/2017  Start time: 1000   Stop Time: 1100   Session # 12    Randi Damon is a 62 y.o. female is being seen today for    Chief Complaint   Patient presents with     Mental Health Visit   .     New symptoms or complaints: None    Functional Impairment:   Personal: 4  Family: 4  Work: 4  Social:4    Clinical assessment of mental status: Patient presented alert and oriented x3.  She was well-groomed.  Her attire was appropriate.  Patient was cooperative.  Patient motor actively was normal and eye contact appropriate.  Patients mood was anxious and affect congruent.  Patient s speech and language was normal.  Patient attention and thought process normal.  Concentration was appropriate.  Patient denied delusions or hallucinations. Patient denied suicidal or homicidal ideation.  Patient denied any long or short term memory problems. No evidence of impairment in judgment.   She has insight and fund of knowledge was adequate.        Suicidal/Homicidal Ideation present: None Reported This Session    Patient's impression of their current status: Pt continues to report anxiety, depression symptoms impacting daily functioning.     Therapist impression of patients current state: Pt continues to process her story and family hx.  She processed past trauma and the struggle with knowing whether she should have or shouldn't have told her family.  Processed impact of trauma on current life and situation.  Process PTSD symptoms, causes and possible treatments in the future      Type of psychotherapeutic technique provided: Insight oriented, Client centered, Solution-focused and CBT    Progress toward short term goals:Progress as expected, processing trauma hx    Review of long term goals: Date of last review 7/31/2017.    Diagnosis:   1. Bipolar affective disorder, currently depressed, moderate    2. Chronic pain syndrome        Plan and Follow up: Follow up with Mabel in 2 weeks  Continue with  regular scheduled appointments  Practice self-care strategies      Discharge Criteria/Planning: Patient will continue with follow-up until therapy can be discontinued without return of signs and symptoms.    Shanice Merida 10/25/2017      This note was created with help of Dragon dictation software. Grammatical / typing errors are not intentional and inherent to the software.

## 2021-06-13 NOTE — PROGRESS NOTES
"Randi Damon is a 65 y.o. female who is being evaluated via a billable video visit.      The patient has been notified of following:     \"This video visit will be conducted via a call between you and your physician/provider. We have found that certain health care needs can be provided without the need for an in-person physical exam.  This service lets us provide the care you need with a video conversation.  If a prescription is necessary we can send it directly to your pharmacy.  If lab work is needed we can place an order for that and you can then stop by our lab to have the test done at a later time.    Video visits are billed at different rates depending on your insurance coverage. Please reach out to your insurance provider with any questions.    If during the course of the call the physician/provider feels a video visit is not appropriate, you will not be charged for this service.\"    Patient has given verbal consent to a Video visit? Yes  How would you like to obtain your AVS? AVS Preference: MyChart.  If dropped by the video visit, the video invitation should be sent to: [phone] KRISTINE  Will anyone else be joining your video visit? No     Pain score: 7  Constant   What does your pain feel like: sharp  Does the pain interfere with:  Work: yes  Walking/distance: no  Sleep: yes  Daily activities: yes  Relationships/social life: no  Mood: no  F= 5        "

## 2021-06-13 NOTE — TELEPHONE ENCOUNTER
2020 Prior Authorization Request  Who's requesting PA: Pharmacy  Provider: Dr. Dusty Dubois  Pharmacy Name, Location & Phone # : [facility] [address]   Medication Name: diazePAM (VALIUM) 2 MG tablet  Insurance Plan: [email or web address]  Insurance Member ID # : [id number]  CoverMyMeds Key: [id number]  Informed patient that prior authorizations can take up to 10 business days for response: YES  Okay to leave a detailed message: YES

## 2021-06-13 NOTE — PROGRESS NOTES
Dear Dr. Loida Stockton MD  [address] [place] [address],    Thank you for the opportunity to participate in the care of Randi Damon.     She is a 62 y.o. y/o female patient who comes to the sleep medicine clinic for follow up.    She was diagnosed with severe KYAW (AHI=36.9)  and has been using CPAP therapy.  Current PAP settings: 11 cwp    Her symptoms are improved since she started using CPAP. She is not happy with her current DME.   She feels more refreshed when she wakes up.    She denies any PAP intolerance. She is using the device every night and tolerates the pressure well.     Residual AHI: 4.7  Leak: 1.6  Compliance: 100% (97 % usage for more than 4 hours)    Mask Tolerance: poor tolerance. She started using a nasal pillow mask. She did not like her initial mask and it was difficult to change that mask.  She is using a dream wear mask now and that is working better but she finds it uncomfortable.    Skin irritation: yes from the mask pushing against her nose.       Past Medical History:   Diagnosis Date     Anxiety      Breast cancer 1994     Chronic pain syndrome      COPD (chronic obstructive pulmonary disease)      Depression      Esophageal reflux      Graves disease      Hemochromatosis      Hx antineoplastic chemotherapy      Hx of radiation therapy      Hypertension      Impaired fasting glucose      Pheochromocytoma      Psoriatic arthropathy      Restless leg syndrome      Right rotator cuff tear      Sleep apnea 2017    CPAP     Spinal stenosis      Urinary incontinence      Vitamin B12 deficiency        Past Surgical History:   Procedure Laterality Date     BREAST BIOPSY       BREAST LUMPECTOMY Left 1994     JOINT REPLACEMENT       LAPAROSCOPIC ADRENALECTOMY Left 8/2/2017    Procedure: LAPAROSCOPIC LEFT ADRENALECTOMY, ;  Surgeon: Gab Linares MD;  Location: [facility];  Service:      MASTECTOMY      left lumpectomy with axillary node dissection     FL  ARTHRODESIS,ANKLE,OPEN Right     Description: Ankle Arthrodesis;  Recorded: 04/07/2010;     CO MASTECTOMY, MODIFIED RADICAL      Description: Modified Radical Mastectomy Left Breast;  Recorded: 04/07/2010;     CO REMOVE TONSILS/ADENOIDS,<11 Y/O      Description: Tonsillectomy With Adenoidectomy;  Recorded: 04/07/2010;     reconstructive breast surgery Right      REDUCTION MAMMAPLASTY Right     approx late [id number]     TONSILLECTOMY         Social History     Social History     Marital status:      Spouse name: N/A     Number of children: N/A     Years of education: N/A     Occupational History     Not on file.     Social History Main Topics     Smoking status: Former Smoker     Quit date: 8/1/2003     Smokeless tobacco: Never Used     Alcohol use No     Drug use: No     Sexual activity: Not on file     Other Topics Concern     Not on file     Social History Narrative       Review of Systems:  General: No weight gain, no weight loss  Eyes: No vision changes  ENT: No hearing changes  Cardio: No chest pain, no nocturnal dyspnea  Respiratory: No shortness of breath, no cough  Gastrointestinal: No diarrhea, no constipation  Genitourinary: No excessive urination  Tegumentary: No rashes  Neurological: No seizures, no loss of consciousness  Endo: No heat or cold intolerance.    Current Outpatient Prescriptions   Medication Sig Dispense Refill     albuterol (PROAIR HFA;PROVENTIL HFA;VENTOLIN HFA) 90 mcg/actuation inhaler Inhale 2 puffs every 6 (six) hours as needed for wheezing.       albuterol (PROVENTIL) 2.5 mg /3 mL (0.083 %) nebulizer solution Take 3 mL (2.5 mg total) by nebulization every 6 (six) hours as needed for wheezing. 75 mL 12     budesonide-formoterol (SYMBICORT) 160-4.5 mcg/actuation inhaler Inhale 2 puffs 2 (two) times a day as needed.        cyanocobalamin, vitamin B-12, 2,500 mcg Subl Place under the tongue 2 (two) times a week.       diclofenac sodium (VOLTAREN) 1 % Gel Apply 2 g topically 2  "(two) times a day as needed (pain on shoulder and ankle).       DULoxetine (CYMBALTA) 30 MG capsule Take 1 capsule (30 mg total) by mouth 2 (two) times a day. 60 capsule 1     ergocalciferol (VITAMIN D2) 50,000 unit capsule Take 50,000 Units by mouth 2 (two) times a week.       folic acid (FOLVITE) 1 MG tablet Take 1 mg by mouth daily.       HYDROcodone-acetaminophen (NORCO )  mg per tablet Take 1 tablet by mouth every 6 (six) hours as needed for pain. 112 tablet 0     levothyroxine (SYNTHROID, LEVOTHROID) 150 MCG tablet Take 1 tablet (150 mcg total) by mouth every other day. Alternate with 175mcg tablets 15 tablet 12     levothyroxine (SYNTHROID, LEVOTHROID) 175 MCG tablet Take 1 tablet (175 mcg total) by mouth every other day. Alternate with 150mcg tablets 15 tablet 12     LORazepam (ATIVAN) 1 MG tablet Take 1 mg by mouth 2 (two) times a day as needed.   0     morphine (MS CONTIN) 15 MG 12 hr tablet Take 1 tablet (15 mg total) by mouth at bedtime as needed for pain. 30 tablet 0     omeprazole (PRILOSEC) 20 MG capsule Take 20 mg by mouth daily.       polyethylene glycol (MIRALAX) 17 gram packet Take 1 packet (17 g total) by mouth daily. 30 packet 0     pramipexole (MIRAPEX) 1 MG tablet Take 0.5-1 mg by mouth at bedtime as needed.       temazepam (RESTORIL) 7.5 MG capsule        triamcinolone (KENALOG) 0.1 % ointment Apply topically 2 (two) times a day as needed. 30 g 1     VOLTAREN 1 % Gel Apply a thin layer twice daily 100 g 2     No current facility-administered medications for this visit.        Allergies   Allergen Reactions     Cephalexin Other (See Comments)     Muscles aches,fatigue     Levofloxacin      Reaction unknown     Penicillins      Mouth sores     Sulfa (Sulfonamide Antibiotics)      Reaction not known       Physical Exam:  /70  Pulse 88  Ht 5' 6\" (1.676 m)  Wt (!) 226 lb (102.5 kg)  SpO2 93%  BMI 36.48 kg/m2  BMI:Body mass index is 36.48 kg/(m^2).   GEN: NAD, " obese  Neurological: Alert, oriented to time, place, and person.  Psych: normal mood, normal affect     Labs/Studies:     I reviewed the efficacy and compliance report from his device.     Lab Results   Component Value Date    WBC 6.2 08/06/2017    HGB 12.8 08/06/2017    HCT 37.5 08/06/2017    MCV 95 08/06/2017     08/06/2017         Chemistry        Component Value Date/Time     08/06/2017 0643    K 3.5 08/06/2017 0643     08/06/2017 0643    CO2 29 08/06/2017 0643    BUN 8 08/06/2017 0643    CREATININE 0.61 08/06/2017 0643    GLU 92 08/06/2017 0643        Component Value Date/Time    CALCIUM 8.8 08/06/2017 0643    ALKPHOS 79 07/25/2017 1215    AST 18 07/25/2017 1215    ALT 15 07/25/2017 1215    BILITOT 0.5 07/25/2017 1215            Lab Results   Component Value Date    FERRITIN 23 07/25/2017           Assessment and Plan:  In summary Randi Damon is a 62 y.o. year old female who is here for follow up.    1. Obstructive Sleep Apnea.  Residual AHI is excellent  Compliance is excellent  Patient is benefiting from using PAP therapy.  Continue with P= 11 cwp.  I will write a new prescription because she wants to switch DMEs..  We counseled the patient on the importannce of using her PAP device every night and the risks of not treating sleep apnea.      Patient verbalized understanding of these issues, agrees with the plan and all questions were answered today. Patient was given an opportuntity to voice any other symptoms or concerns not listed above. Patient did not have any other symptoms or concerns.      Patient told to return in 3 months.     MD PRETTY Stoner Board Certified in Internal Medicine and Sleep Medicine  [facility].
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
## 2021-06-13 NOTE — PROGRESS NOTES
Patient leaves a message that she has been off Valium for 3 days as it was making her irritable, and stop the Depakote.    She has talked to her hematologist and started iron for rate placement of her restless legs.    She has not had any mood episodes for 3 days and feels in some ways her mood is improving.    She did change her thyroid replacement to a lower dose of brand-name.    Please to be off opioids for 21 days after being off for 20 years.  She did not need to call have just monitor it for me.

## 2021-06-13 NOTE — TELEPHONE ENCOUNTER
Central PA team  [phone]  Pool: HE PA MED ([id number])          PA has been initiated.       PA form completed and faxed insurance via Cover My Meds     Key:  [id number]     Medication:  Diazepam     Insurance:  [employer]        Response will be received via fax and may take up to 5-10 business days depending on plan

## 2021-06-13 NOTE — PROGRESS NOTES
Order for Durable Medical Equipment was processed and equipment ordered.   DME provider: [facility]  Date Faxed: 9/21/17  Ordering Provider:   Equipment ordered: [facility]Supplies

## 2021-06-13 NOTE — TELEPHONE ENCOUNTER
Winnie, your thyroid is still running high and the dose needs to be adjusted.  Are you taking your synthroid daily for 7 days or 6 days per week?

## 2021-06-13 NOTE — PATIENT INSTRUCTIONS - HE
PLAN:  Decrease the Depakote to 250 mg 1 tablet at bedtime.    Laboratories ordered and you may go to the [facility] lab tomorrow.    Addition of Wellbutrin 100mg 1 daily.  Call in 5 days to discuss.    You may remove the Butrans patch.    We discussed hospitalizations at mental health unit at [facility], you may go to the emergency room if you feel that is needed.    Follow-up with Dr. Dubois in 4 weeks.

## 2021-06-14 NOTE — TELEPHONE ENCOUNTER
[facility]  Copy sent to scan  Copy filed with forms for 2 weeks

## 2021-06-14 NOTE — PROGRESS NOTES
Randi RIVERA CathyAlyce is a 65 y.o. female who is being evaluated via a billable video visit.      How would you like to obtain your AVS? My chart   If dropped from the video visit, the video invitation should be resent by: email  Will anyone else be joining your video visit?     Patient is here for a follow up appointment with Dr. Dubois for focus on mental health,rates the pain as 6/10. Patient needs refills for Ropinrole.    Pamela Ayers LPN

## 2021-06-14 NOTE — PROGRESS NOTES
"Preoperative Exam    Scheduled Procedure: (R) Cataract  Surgery Date:  2/10/21  Surgery Location: [facility]/[place] [id number]  Surgeon:  Dr Rodgers    Assessment/Plan:     1. Preop general physical exam  Winnie is medically stable for conscious sedation prior to cataract surgery.  Although she has multiple medical problems, they are all controlled.  She has had hypoxemia post anesthetic events in the past.  She has obstructive sleep apnea-with CPAP machine under repair.  Recent cataract surgery went without incident.     Medications are reviewed.  She will present to the surgery center NPO.  She will take her medications after her morning surgery.  We will use her a.m. inhalers.     Aftercare will be provided by her .    Laboratory studies and EKG are current.  She has a chronic right bundle branch block with no acute abnormalities.  Laboratory studies from January 6 are reviewed and within normal limits.       2. Chronic obstructive pulmonary disease, unspecified COPD type (H)  The most part stable.  New cough today-secondary to \"no water in my humidifier last night \".  Exam unremarkable  Recommendation: We will check chest x-ray given underlying COPD and upcoming colonoscopy and cataract surgery  - XR Chest 2 Views    ADDENDUM: Chest x-ray is reviewed and is negative    3. Sleep apnea, obstructive  Currently not using machine.  \"Under repairs \"    4. Type 2 diabetes mellitus without complication, without long-term current use of insulin (H)  Stable    5. Hypertension due to endocrine disorder  On losartan.  Continue current medications.  She will take her blood pressure medication after her procedure  - losartan (COZAAR) 25 MG tablet; Take 1 tablet (25 mg total) by mouth daily.  Dispense: 90 tablet; Refill: 3    6. Severe major depression (H)  He is followed by Dr. Dubois at the pain clinic for major depression.  Entertaining a diagnosis of bipolar disorder    7. Neck pain  New neck pain following " slipped in the bathtub.  MRI today.  Neck has normal range of motion today.    8. Cough  As above-nonproductive cough-likely secondary to dryness.  However, will check chest x-ray.  - XR Chest 2 Views    Chest x-ray is reviewed and is negative.        There are no Patient Instructions on file for this visit.    Surgical Procedure Risk: Low (reported cardiac risk generally < 1%)  Have you had prior anesthesia?: Yes  Have you or any family members had a previous anesthesia reaction:  No  Do you or any family members have a history of a clotting or bleeding disorder?: No  Cardiac Risk Assessment: no increased risk for major cardiac complications    APPROVAL GIVEN to proceed with proposed procedure, without further diagnostic evaluation        Functional Status: Independent  Patient plans to recover at home with family.     Subjective:      Randi Damon is a 65 y.o. female who presents for a preoperative consultation.  She is scheduled for her second cataract surgery.  Of note, she had a preop on January 6.  She is slightly over 1 month and therefore needs to be updated.  Since last visit, she states that she has been fairly stable.  She continues to lose weight which is a good thing for her.  She does describe a dry cough this a.m. for which she attributes secondary to no water in her humidifier last night.  She does have chronic COPD.  She does have nebulizers at home and uses inhalers.  She denies any fever, chills or any other new symptoms.    Additionally, she does report that she fell approximately 1 to 2 weeks ago.  She was taking a lavender bath when she slipped in the tub.  She is working with [facility] with regard to some neck pain and numbness in her upper extremities.  She has an MRI scheduled for this afternoon.  She will contact me with the report.    Pertinent anesthesia history is reviewed.  Because of previous history of opioid use, she is somewhat opioid tolerant.  She has no  untoward reaction to local or general anesthetic agents.  She does report a history of post anesthesia hypoxemia in the past.  She does not have primary coronary artery disease.  She has not had an arrhythmia.  She has had type 2 diabetes that is improving with weight loss.. She has chronic lung disease.  She is very sedentary.  There is no history of bleeding or procoagulant disorder.  She does have pulmonary hypertension.    All other systems reviewed and are negative, other than those listed in the HPI.    HPI related to upcoming procedure:     Preop Questions 1/4/2021   Have you ever had a heart attack or stroke? No   Have you ever had surgery on your heart or blood vessels, such as a stent placement, a coronary artery bypass, or surgery on an artery in your head, neck, heart, or legs? No   Do you have chest pain with activity? No   Do you have a history of  heart failure? No   Do you currently have a cold, bronchitis or symptoms of other infection? No   Do you have a cough, shortness of breath, or wheezing? YES - COPD   Do you or anyone in your family have previous history of blood clots? YES - Mother and sister   Do you or does anyone in your family have a serious bleeding problem such as prolonged bleeding following surgeries or cuts? No   Have you ever had problems with anemia or been told to take iron pills? YES - Takes Iron supplements but currently on hold   Have you had any abnormal blood loss such as black, tarry or bloody stools, or abnormal vaginal bleeding? No   Have you ever had a blood transfusion? No   Are you willing to have a blood transfusion if it is medically needed before, during, or after your surgery? Yes   Have you or any of your relatives ever had problems with anesthesia? No   Do you have sleep apnea, excessive snoring or daytime drowsiness? YES -  Sleep Apnea - in process of getting CPAP   Do you have a CPAP machine? Yes   Do you have any artifical heart valves or other implanted  medical devices like a pacemaker, defibrillator, or continuous glucose monitor? No   Do you have artificial joints? YES - (R) Ankle    Are you allergic to latex? No           Current Outpatient Medications   Medication Sig Dispense Refill     albuterol (PROAIR HFA;PROVENTIL HFA;VENTOLIN HFA) 90 mcg/actuation inhaler Inhale 2 puffs every 4 (four) hours as needed. 8.5 g 12     albuterol (PROVENTIL) 2.5 mg /3 mL (0.083 %) nebulizer solution Take 3 mL (2.5 mg total) by nebulization every 6 (six) hours as needed for wheezing. 75 mL 12     aspirin 81 MG EC tablet Take 81 mg by mouth daily.       calcium carb/mag carb/folic ac (MAGNESIUM-CALCIUM-FOLIC ACID ORAL) Take by mouth.       cholecalciferol, vitamin D3, (VITAMIN D3) 5,000 unit Tab Take 10,000 Units by mouth daily.       clobetasoL (TEMOVATE) 0.05 % cream Mix 1:1 with Eucerin repair cream and apply to hands following hydrotherapy instructions daily at bedtime, repeat as needed for flares Exter       cyanocobalamin, vitamin B-12, 5,000 mcg/mL Drop Take 5,000 mcg by mouth daily.        fluticasone-umeclidinium-vilanterol (TRELEGY ELLIPTA) 100-62.5-25 mcg DsDv inhaler Inhale 1 Inhalation daily. 3 each 3     furosemide (LASIX) 20 MG tablet Take 1 tablet (20 mg total) by mouth daily. 180 tablet 3     iron,carbonyl-vitamin C (VITRON-C) 65 mg iron- 125 mg TbEC Take 1 tablet by mouth daily.       ketorolac (ACULAR) 0.5 % ophthalmic solution Place 1 drop into left eye three times a day as directed.  Start 3 days before surgery       KLOR-CON M20 20 mEq tablet Take 1 tablet (20 mEq total) by mouth 2 (two) times a day. 180 tablet 3     levothyroxine (SYNTHROID, LEVOTHROID) 150 MCG tablet Take 1 tablet (150 mcg total) by mouth daily. TAY SYNTHROID 90 tablet 2     losartan (COZAAR) 25 MG tablet Take 25 mg by mouth daily.       ofloxacin (OCUFLOX) 0.3 % ophthalmic solution Instill 1 drop into left eye three times a day as directed. Start 3 days before surgery       omeprazole  (PRILOSEC) 20 MG capsule TAKE 1 CAPSULE BY MOUTH 2 TIMES A  capsule 2     pimecrolimus (ELIDEL) 1 % cream Starting day 11: apply topically to affected area on eyelids and ears Twice a day, except Wed. and Sat. ongoing Externally 30 days       prednisoLONE acetate (PRED-FORTE) 1 % ophthalmic suspension place 1 drop into the left eye three times a day as directed. start 3 days before surgery.       rOPINIRole (REQUIP) 2 MG tablet Take 1 tablet (2 mg total) by mouth at bedtime. 30 tablet 0     UNABLE TO FIND Take 1 tablet by mouth daily. Med Name: Vitamin C with Iron       Current Facility-Administered Medications   Medication Dose Route Frequency Provider Last Rate Last Admin     albuterol nebulizer solution 2.5 mg (PROVENTIL)  2.5 mg Inhalation Once Loida Stockton MD         cyanocobalamin injection 1,000 mcg  1,000 mcg Intramuscular Q30 Days Loida Stockton MD   1,000 mcg at 05/28/19 0912        Allergies   Allergen Reactions     Riluzole Swelling     Swollen face and tongue     Buspar [Buspirone]      Serotonin syndrome     Cephalexin Other (See Comments)     Muscles aches,fatigue     Gabapentin      Drove on the wrong side of the highway     Levofloxacin      Reaction unknown     Penicillins      Mouth sores     Pristiq [Desvenlafaxine]      Serotonin syndrome       Sulfa (Sulfonamide Antibiotics)      Reaction not known       Patient Active Problem List   Diagnosis     Psoriatic Arthropathy     Osteopenia     Restless Legs Syndrome     Vitamin B12 deficiency     Depression     Postablative hypothyroidism     Vitamin D Deficiency     Hemochromatosis     Impaired Fasting Glucose     History of breast cancer     Morbid Obesity     Hypertension due to endocrine disorder     Esophageal Reflux     Psoriasis     Spinal Stenosis     Benign Adenomatous Polyp Of The Large Intestine     Joint Pain, Localized In The Hip     Anxiety state, unspecified     Sacroiliitis (H)     Neck pain     COPD (chronic  obstructive pulmonary disease) (H)     Sleep apnea, obstructive     Anxiety     Hypoglycemia     Drug-induced constipation     Hereditary hemochromatosis (H)     Malignant neoplasm of left female breast, unspecified estrogen receptor status, unspecified site of breast (H)     Chronic intractable pain     Pulmonary hypertension (H)     Serotonin syndrome     Type 2 diabetes mellitus without complication, without long-term current use of insulin (H)     Severe major depression (H)       Past Medical History:   Diagnosis Date     Anxiety      Breast cancer (H) 1994     Chronic pain syndrome      COPD (chronic obstructive pulmonary disease) (H)      Depression      Esophageal reflux      Graves disease      Hemochromatosis      Hx antineoplastic chemotherapy      Hx of radiation therapy      Hypertension      Impaired fasting glucose      Pheochromocytoma      Psoriasis      Psoriatic arthropathy (H)      Restless leg syndrome      Right rotator cuff tear      Sleep apnea 2017    CPAP     Spinal stenosis      Urinary incontinence      Vitamin B12 deficiency        Past Surgical History:   Procedure Laterality Date     Adrenalectomy for pheochromocytoma       BREAST BIOPSY       BREAST LUMPECTOMY Left 1994     JOINT REPLACEMENT       LAPAROSCOPIC ADRENALECTOMY Left 8/2/2017    Procedure: LAPAROSCOPIC LEFT ADRENALECTOMY, ;  Surgeon: Gab Linares [place];  [place] [facility] [place]:      MASTECTOMY      left lumpectomy with axillary node dissection     IA ARTHRODESIS,ANKLE,OPEN Right     Description: Ankle Arthrodesis;  Recorded: 04/07/2010;     IA MASTECTOMY, MODIFIED RADICAL      Description: Modified Radical Mastectomy Left Breast;  Recorded: 04/07/2010;     IA REMOVE TONSILS/ADENOIDS,<11 Y/O      Description: Tonsillectomy With Adenoidectomy;  Recorded: 04/07/2010;     reconstructive breast surgery Right      REDUCTION MAMMAPLASTY Right     approx late [id number]     TONSILLECTOMY         Social History  "    Socioeconomic History     Marital status:      Spouse name: Not on file     Number of children: Not on file     Years of education: Not on file     Highest education level: Not on file   Occupational History     Not on file   Social Needs     Financial resource strain: Not on file     Food insecurity     Worry: Not on file     Inability: Not on file     Transportation needs     Medical: Not on file     Non-medical: Not on file   Tobacco Use     Smoking status: Former Smoker     Quit date: 2003     Years since quittin.5     Smokeless tobacco: Never Used   Substance and Sexual Activity     Alcohol use: No     Drug use: No     Sexual activity: Not on file   Lifestyle     Physical activity     Days per week: Not on file     Minutes per session: Not on file     Stress: Not on file   Relationships     Social connections     Talks on phone: Not on file     Gets together: Not on file     Attends Hindu service: Not on file     Active member of club or organization: Not on file     Attends meetings of clubs or organizations: Not on file     Relationship status: Not on file     Intimate partner violence     Fear of current or ex partner: Not on file     Emotionally abused: Not on file     Physically abused: Not on file     Forced sexual activity: Not on file   Other Topics Concern     Not on file   Social History Narrative     Not on file             Objective:     Vitals:    21 0713   BP: 120/80   Pulse: 96   Resp: 22   SpO2: 96%   Weight: (!) 224 lb 3 oz (101.7 kg)   Height: 5' 5.5\" (1.664 m)         Physical Exam:  Appears at baseline-in no acute distress but has a dry cough.  Weight is down from last visit.  Vitals are stable.  Lungs are slightly diminished but with no adventitious sounds  Cardiac exam reveals a mild tachycardia but no irregularity  Abdomen is soft and nontender  Extremities are without edema  Neck is palpated with some minor paraspinous tenderness.  She is able to flex her " head up and down.  Rotation is slightly limited.        Labs:  No labs were ordered during this visit   Labs from January 6 were all within normal limits.    Immunization History   Administered Date(s) Administered     INFLUENZA,RECOMBINANT,INJ,PF QUADRIVALENT 18+YRS 12/19/2018     Influenza D4w5-37, 02/23/2010     Influenza high dose,seasonal,PF, 65+ yrs 12/06/2019     Influenza, inj, historic,unspecified 12/04/2007, 12/10/2008, 12/10/2008, 02/23/2010, 09/17/2010, 09/07/2011, 11/14/2012, 10/16/2013, 10/01/2014, 10/27/2015, 09/06/2017, 12/06/2019     Influenza, seasonal,quad inj 6-35 mos 11/14/2012, 10/16/2013, 10/01/2014     Influenza,quad,high Dose,PF, 65yr + 10/02/2020     Influenza,seasonal,quad inj =/> 6months 10/27/2015, 09/06/2017     Pneumo Conj 13-V (2010&after) 12/10/2014     Pneumo Polysac 23-V 03/23/2000     Tdap 05/21/2008           Electronically signed by Loida Stockton MD 01/29/21 7:16 AM

## 2021-06-14 NOTE — PROGRESS NOTES
Preoperative Exam    Scheduled Procedure: LEFT KELMAN PHACOEMULSIFICATION CLEAR CORNEAL INTRAOCULAR LENS IMPLANT  Surgery Date:  1/14/21  Surgery Location: [facility]:  Fax:  [phone]    Surgeon:  Dr. Primo Pino    Assessment/Plan:     1. Pre-op exam  Winnie is medically stable for conscious sedation prior to cataract surgery.  Although she has multiple medical problems, they are all controlled.  She has had hypoxemia post anesthetic events in the past.  She has obstructive sleep apnea-with CPAP machine under repair.    Medications are reviewed.  She will present to the surgery center NPO.  She will take her medications after her morning surgery.    Aftercare will be provided by her .    - Electrocardiogram Perform and Read-personally reviewed.  Sinus tachycardia with right bundle branch block and no acute abnormalities  - HM2(CBC w/o Differential)  - Basic Metabolic Panel    2. Sleep apnea, obstructive  Noted.  Patient's machine is currently under repair    3. Hereditary hemochromatosis (H)  History of hereditary hemochromatosis with hemoglobin of 16.6.  Will update labs.  - Ferritin  - Iron and Transferrin Iron Binding Capacity    4. Chronic obstructive pulmonary disease, unspecified COPD type (H)  Stable    5. Postablative hypothyroidism  Recent medication change-on Synthroid-150 mcg    6. Type 2 diabetes mellitus without complication, without long-term current use of insulin (H)  We will update labs  - Glycosylated Hemoglobin A1c    7. Pulmonary hypertension (H)  Stable    8. Psoriatic arthropathy (H)  Stable    9. Bipolar disorder, current episode mixed, severe, without psychotic features (H)  Currently off all mental health medications due to recent serotonin syndrome.  Struggling with depression    10. Morbid obesity (H)  Pound weight loss since last visit    11. Malignant neoplasm of left female breast, unspecified estrogen receptor status, unspecified site of breast  (H)  Noted    There are no Patient Instructions on file for this visit.    Surgical Procedure Risk: Low (reported cardiac risk generally < 1%)  Have you had prior anesthesia?: Yes  Have you or any family members had a previous anesthesia reaction:  YES-postoperative hypoxemia  Do you or any family members have a history of a clotting or bleeding disorder?:  No personal history of clotting disorder  Cardiac Risk Assessment: no increased risk for major cardiac complications    APPROVAL GIVEN to proceed with proposed procedure, without further diagnostic evaluation    Please Note:  Patient uses a CPAP machine.    Functional Status: Independent  Patient plans to recover at home with family.     Subjective:      Randi Damon is a 65 y.o. female who presents for a preoperative consultation.  Winnie is scheduled for a cataract procedure.  She is eagerly anticipating improvement of her vision as it has markedly diminished.    She has multiple medical problems.  She experienced a bout of serotonin syndrome several months ago.  She is now off all of her psychiatric and pain medications.  She continues to struggle somewhat with depression.  She believes her mood is stabilizing and vows to be more active, eat better, etc.    Currently, she denies any symptoms of an infectious disease nature.  She denies cough, chest pain, shortness of breath or any new bowel or bladder problems.    Pertinent anesthesia history is reviewed: She has had postoperative hypoxemia in the past.  She denies any allergies or untoward reactions to local or general anesthetic agents.  She does not have a personal history of bleeding disorder or procoagulant disorder.  She does not have primary coronary artery disease but does have type 2 diabetes that is controlled.  She has chronic lung disease.  There is no history of chronic kidney disease.  She is quite sedentary.    All other systems reviewed and are negative, other than those listed in the  HPI.    HPI related to upcoming procedure:     Preop Questions 1/4/2021   Have you ever had a heart attack or stroke? No   Have you ever had surgery on your heart or blood vessels, such as a stent placement, a coronary artery bypass, or surgery on an artery in your head, neck, heart, or legs? No   Do you have chest pain with activity? No   Do you have a history of  heart failure? No   Do you currently have a cold, bronchitis or symptoms of other infection? No   Do you have a cough, shortness of breath, or wheezing? YES -underlying COPD-stable   Do you or anyone in your family have previous history of blood clots? NO-no personal history of blood clots   Do you or does anyone in your family have a serious bleeding problem such as prolonged bleeding following surgeries or cuts? No   Have you ever had problems with anemia or been told to take iron pills? YES -she is on iron tablets per her hematologist   Have you had any abnormal blood loss such as black, tarry or bloody stools, or abnormal vaginal bleeding? No   Have you ever had a blood transfusion? No   Are you willing to have a blood transfusion if it is medically needed before, during, or after your surgery? Yes   Have you or any of your relatives ever had problems with anesthesia? No   Do you have sleep apnea, excessive snoring or daytime drowsiness? YES -has CPAP machine that is under repairs   Do you have a CPAP machine? Yes   Do you have any artifical heart valves or other implanted medical devices like a pacemaker, defibrillator, or continuous glucose monitor? No   Do you have artificial joints? YES -    Are you allergic to latex? No           Current Outpatient Medications   Medication Sig Dispense Refill     albuterol (PROAIR HFA;PROVENTIL HFA;VENTOLIN HFA) 90 mcg/actuation inhaler Inhale 2 puffs every 4 (four) hours as needed. 8.5 g 12     albuterol (PROVENTIL) 2.5 mg /3 mL (0.083 %) nebulizer solution Take 3 mL (2.5 mg total) by nebulization every 6 (six)  hours as needed for wheezing. 75 mL 12     aspirin 81 MG EC tablet Take 81 mg by mouth daily.       calcium carb/mag carb/folic ac (MAGNESIUM-CALCIUM-FOLIC ACID ORAL) Take by mouth.       cholecalciferol, vitamin D3, (VITAMIN D3) 5,000 unit Tab Take 10,000 Units by mouth daily.       clobetasoL (TEMOVATE) 0.05 % cream Mix 1:1 with Eucerin repair cream and apply to hands following hydrotherapy instructions daily at bedtime, repeat as needed for flares Exter       cyanocobalamin, vitamin B-12, 5,000 mcg/mL Drop Take 5,000 mcg by mouth daily.        fluticasone-umeclidinium-vilanterol (TRELEGY ELLIPTA) 100-62.5-25 mcg DsDv inhaler Inhale 1 Inhalation daily. 3 each 3     furosemide (LASIX) 20 MG tablet Take 2 tablets (40 mg total) by mouth daily. 180 tablet 3     iron,carbonyl-vitamin C (VITRON-C) 65 mg iron- 125 mg TbEC Take 1 tablet by mouth daily.       ketorolac (ACULAR) 0.5 % ophthalmic solution Place 1 drop into left eye three times a day as directed.  Start 3 days before surgery       KLOR-CON M20 20 mEq tablet Take 1 tablet (20 mEq total) by mouth 2 (two) times a day. 180 tablet 3     levothyroxine (SYNTHROID, LEVOTHROID) 150 MCG tablet Take 1 tablet (150 mcg total) by mouth daily. TAY SYNTHROID 90 tablet 2     losartan (COZAAR) 25 MG tablet Take 25 mg by mouth daily.       ofloxacin (OCUFLOX) 0.3 % ophthalmic solution Instill 1 drop into left eye three times a day as directed. Start 3 days before surgery       omeprazole (PRILOSEC) 20 MG capsule TAKE 1 CAPSULE BY MOUTH 2 TIMES A  capsule 2     pimecrolimus (ELIDEL) 1 % cream Starting day 11: apply topically to affected area on eyelids and ears Twice a day, except Wed. and Sat. ongoing Externally 30 days       prednisoLONE acetate (PRED-FORTE) 1 % ophthalmic suspension place 1 drop into the left eye three times a day as directed. start 3 days before surgery.       rOPINIRole (REQUIP) 2 MG tablet Take 1 tablet (2 mg total) by mouth at bedtime. 30 tablet 0      UNABLE TO FIND Take 1 tablet by mouth daily. Med Name: Vitamin C with Iron       buPROPion (WELLBUTRIN SR) 100 MG 12 hr tablet Take 1 tablet (100 mg total) by mouth daily. 30 tablet 2     diazePAM (VALIUM) 2 MG tablet Take 1 tablet (2 mg total) by mouth every 8 (eight) hours as needed. 60 tablet 1     divalproex (DEPAKOTE DR) 250 MG 12 hour tablet One bedtime for five nights, may increase to two bedtime 30 tablet 1     lamoTRIgine (LAMICTAL) 25 MG tablet One daily for two weeks, one twice a day 60 tablet 0     levothyroxine (SYNTHROID, LEVOTHROID) 175 MCG tablet Take 1 tablet daily for 6 days of the week, 7th day PRN (Patient taking differently: 175 mcg daily. ) 14 tablet 0     lithium 150 MG capsule Take 1 capsule (150 mg total) by mouth 3 (three) times a day with meals. 90 capsule 2     medical cannabis (Patient's own supply) by Other route see administration instructions. (The purpose of this order is to document that the patient reports taking medical cannabis. This is not a prescription, and is not used to certify that the patient has a  qualifying medical condition.)   THC- Red Tinsure-  Green/Yellow/ Red capsules  CBD sublingual spray       pramipexole (MIRAPEX) 0.5 MG tablet Take 1 tablet (0.5 mg total) by mouth at bedtime. 30 tablet 2     Current Facility-Administered Medications   Medication Dose Route Frequency Provider Last Rate Last Admin     albuterol nebulizer solution 2.5 mg (PROVENTIL)  2.5 mg Inhalation Once Loida Stockton MD         cyanocobalamin injection 1,000 mcg  1,000 mcg Intramuscular Q30 Days Loida Stockton MD   1,000 mcg at 05/28/19 0912        Allergies   Allergen Reactions     Riluzole Swelling     Swollen face and tongue     Buspar [Buspirone]      Serotonin syndrome     Cephalexin Other (See Comments)     Muscles aches,fatigue     Gabapentin      Drove on the wrong side of the highway     Levofloxacin      Reaction unknown     Penicillins      Mouth sores     Pristiq  [Desvenlafaxine]      Serotonin syndrome       Sulfa (Sulfonamide Antibiotics)      Reaction not known       Patient Active Problem List   Diagnosis     Psoriatic Arthropathy     Osteopenia     Restless Legs Syndrome     Vitamin B12 deficiency     Depression     Postablative hypothyroidism     Vitamin D Deficiency     Hemochromatosis     Impaired Fasting Glucose     History of breast cancer     Morbid Obesity     Hypertension due to endocrine disorder     Esophageal Reflux     Psoriasis     Spinal Stenosis     Benign Adenomatous Polyp Of The Large Intestine     Joint Pain, Localized In The Hip     Anxiety state, unspecified     Sacroiliitis (H)     Neck pain     COPD (chronic obstructive pulmonary disease) (H)     Sleep apnea, obstructive     Anxiety     Hypoglycemia     Drug-induced constipation     Hereditary hemochromatosis (H)     Malignant neoplasm of left female breast, unspecified estrogen receptor status, unspecified site of breast (H)     Chronic intractable pain     Pulmonary hypertension (H)     Serotonin syndrome     Type 2 diabetes mellitus without complication, without long-term current use of insulin (H)     Severe major depression (H)       Past Medical History:   Diagnosis Date     Anxiety      Breast cancer (H) 1994     Chronic pain syndrome      COPD (chronic obstructive pulmonary disease) (H)      Depression      Esophageal reflux      Graves disease      Hemochromatosis      Hx antineoplastic chemotherapy      Hx of radiation therapy      Hypertension      Impaired fasting glucose      Pheochromocytoma      Psoriasis      Psoriatic arthropathy (H)      Restless leg syndrome      Right rotator cuff tear      Sleep apnea 2017    CPAP     Spinal stenosis      Urinary incontinence      Vitamin B12 deficiency        Past Surgical History:   Procedure Laterality Date     Adrenalectomy for pheochromocytoma       BREAST BIOPSY       BREAST LUMPECTOMY Left 1994     JOINT REPLACEMENT       LAPAROSCOPIC  ADRENALECTOMY Left 2017    Procedure: LAPAROSCOPIC LEFT ADRENALECTOMY, ;  Surgeon: Gab Linares MD;  Location: [facility] OR;  Service:      MASTECTOMY      left lumpectomy with axillary node dissection     UT ARTHRODESIS,ANKLE,OPEN Right     Description: Ankle Arthrodesis;  Recorded: 2010;     UT MASTECTOMY, MODIFIED RADICAL      Description: Modified Radical Mastectomy Left Breast;  Recorded: 2010;     UT REMOVE TONSILS/ADENOIDS,<11 Y/O      Description: Tonsillectomy With Adenoidectomy;  Recorded: 2010;     reconstructive breast surgery Right      REDUCTION MAMMAPLASTY Right     approx late /     TONSILLECTOMY         Social History     Socioeconomic History     Marital status:      Spouse name: Not on file     Number of children: Not on file     Years of education: Not on file     Highest education level: Not on file   Occupational History     Not on file   Social Needs     Financial resource strain: Not on file     Food insecurity     Worry: Not on file     Inability: Not on file     Transportation needs     Medical: Not on file     Non-medical: Not on file   Tobacco Use     Smoking status: Former Smoker     Quit date: 2003     Years since quittin.4     Smokeless tobacco: Never Used   Substance and Sexual Activity     Alcohol use: No     Drug use: No     Sexual activity: Not on file   Lifestyle     Physical activity     Days per week: Not on file     Minutes per session: Not on file     Stress: Not on file   Relationships     Social connections     Talks on phone: Not on file     Gets together: Not on file     Attends Holiness service: Not on file     Active member of club or organization: Not on file     Attends meetings of clubs or organizations: Not on file     Relationship status: Not on file     Intimate partner violence     Fear of current or ex partner: Not on file     Emotionally abused: Not on file     Physically abused: Not on file     Forced  "sexual activity: Not on file   Other Topics Concern     Not on file   Social History Narrative     Not on file             Objective:     Vitals:    01/06/21 1626   BP: 124/76   Pulse: (!) 112   SpO2: 96%   Weight: (!) 232 lb 9.6 oz (105.5 kg)   Height: 5' 5.5\" (1.664 m)         Physical Exam:  EYES: Eyelids, conjunctiva, and sclera were normal. Pupils were normal. Cornea, iris, and lens were normal bilaterally.  HEAD, EARS, NOSE, MOUTH, AND THROAT: Head and face were normal. Hearing was normal to voice and the ears were normal to external exam. Nose appearance was normal and there was no discharge. Oropharynx was normal.  TMs were normal.  NECK: Neck appearance was normal. There were no neck masses and the thyroid was not enlarged.  RESPIRATORY: Breathing pattern was normal and the chest moved symmetrically.   Lung sounds were equal bilaterally.  CARDIOVASCULAR: Heart rate and rhythm were normal.  S1 and S2 were normal and there were no extra sounds or murmurs. Peripheral pulses in arms and legs were normal.  Jugular venous pressure was normal.  There was no peripheral edema.  GASTROINTESTINAL: The abdomen was normal in contour.  Bowel sounds were present.   Palpation detected no tenderness, mass, or enlarged organs.   MUSCULOSKELETAL: Skeletal configuration was normal and muscle mass was normal for age. Joint appearance was overall normal.  LYMPHATIC: There were no enlarged nodes.  SKIN/HAIR/NAILS: Skin color was normal.  There were no abnormal skin lesions.  Hair and nails were normal.  NEUROLOGIC: The patient was alert and oriented to person, place, time, and circumstance. Speech was normal. Cranial nerves were normal. Motor strength was normal for age. The patient was normally coordinated.  PSYCHIATRIC:  Mood and affect were normal and the patient had normal recent and remote memory. The patient's judgment and insight were normal.              Labs:  Recent Results (from the past 24 hour(s))   Electrocardiogram " Perform and Read    Collection Time: 01/06/21  4:37 PM   Result Value Ref Range    SYSTOLIC BLOOD PRESSURE      DIASTOLIC BLOOD PRESSURE      VENTRICULAR RATE 102 BPM    ATRIAL RATE 102 BPM    P-R INTERVAL 166 ms    QRS DURATION 116 ms    Q-T INTERVAL 356 ms    QTC CALCULATION (BEZET) 463 ms    P Axis 45 degrees    R AXIS 68 degrees    T AXIS 24 degrees    MUSE DIAGNOSIS       Sinus tachycardia  Right bundle branch block  Abnormal ECG  When compared with ECG of 24-JUN-2019 18:10,  Premature ventricular complexes are no longer Present     HM2(CBC w/o Differential)    Collection Time: 01/06/21  5:26 PM   Result Value Ref Range    WBC 8.3 4.0 - 11.0 thou/uL    RBC 5.48 (H) 3.80 - 5.40 mill/uL    Hemoglobin 17.2 (H) 12.0 - 16.0 g/dL    Hematocrit 49.2 (H) 35.0 - 47.0 %    MCV 90 80 - 100 fL    MCH 31.3 27.0 - 34.0 pg    MCHC 34.9 32.0 - 36.0 g/dL    RDW 13.6 11.0 - 14.5 %    Platelets 254 140 - 440 thou/uL    MPV 6.3 (L) 7.0 - 10.0 fL   Glycosylated Hemoglobin A1c    Collection Time: 01/06/21  5:26 PM   Result Value Ref Range    Hemoglobin A1c 5.9 (H) <=5.6 %       Immunization History   Administered Date(s) Administered     INFLUENZA,RECOMBINANT,INJ,PF QUADRIVALENT 18+YRS 12/19/2018     Influenza [id number], 02/23/2010     Influenza high dose,seasonal,PF, 65+ yrs 12/06/2019     Influenza, inj, historic,unspecified 12/04/2007, 12/10/2008, 12/10/2008, 02/23/2010, 09/17/2010, 09/07/2011, 11/14/2012, 10/16/2013, 10/01/2014, 10/27/2015, 09/06/2017, 12/06/2019     Influenza, seasonal,quad inj 6-35 mos 11/14/2012, 10/16/2013, 10/01/2014     Influenza,quad,high Dose,PF, 65yr + 10/02/2020     Influenza,seasonal,quad inj =/> 6months 10/27/2015, 09/06/2017     Pneumo Conj 13-V (2010&after) 12/10/2014     Pneumo Polysac 23-V 03/23/2000     Tdap 05/21/2008           Electronically signed by Loida Stockton MD 01/06/21 4:28 PM

## 2021-06-14 NOTE — TELEPHONE ENCOUNTER
The patient's [facility] message.    I did speak with the  Last Friday Night after She Went to the Emergency [facility] emergency room.    Patient's [facility] message today indicates that she is doing better.  She tried an old prescription of Ropirinol 2 mg of slept better the first night, not so far the last few nights.    She is taking iron replacement.    She has been using magnesium, causing some loose stools.  She thinks in general her mood is doing a bit better.  She did wonder about lamotrigine again which she recalls had been on the past but that may have been when her pheochromocytoma was developing.  On the phone she sounds as much more organized and less agitated.    The plan is to give magnesium orotate or glycinate, add in lamictal

## 2021-06-14 NOTE — PROGRESS NOTES
Assessment/Plan:     Problem List Items Addressed This Visit     Restless Legs Syndrome    Relevant Medications    rOPINIRole (REQUIP) 2 MG tablet            No follow-ups on file.    Patient Instructions   PLAN:  May increase your magnesium supplement up to 2 tablets 3 times a day to see if it helps for muscle cramps.  Decrease of have loose stools.    Continue the Requip 2 mg at bedtime.    You may resume the medical cannabis to see if it helps with pain and does not cause restless leg syndrome.    May obtain the supplement lithium orotate, 10 mg at bedtime, to see if it helps with mood stabilization.    Continue working with your psychotherapist.    You are working with your previous orthopedist around arm pain.    Follow-up with Dr. Dubois in 6 to 8 weeks.        Subjective:       65 y.o. female Randi Damon is a 65 y.o. female who is being evaluated via a billable video visit.      How would you like to obtain your AVS? MyChart.  If dropped from the video visit, the video invitation should be resent by:   Will anyone else be joining your video visit?       Video Start Time:       Subjective     Randi Damon is 65 y.o. and presents to clinic today for the following health issues   HPI follow-up for problems occluding psoriatic arthritis, with comorbid mood disorder, with history of pheochromocytoma, sleep apnea restless legs.    By video she reviews having had 1 cataract surgery and the other next week.  That went well.    She is having some arm pain, hurt it getting out of the deep bathtub.  She has had some numbness down the arm to the fingertips.  Her previous orthopedist Dr. Moise is not available and is working with a sports provider who reviewed the MRI.    She reviews sleeping better using the really is all 2 mg at bedtime.  Has also been taking a magnesium supplement 1 capsule 3 times a day which she feels absorbing in some benefit and no GI symptoms.    She decided not to take the  supplement for adrenal support.    Notes her emotions still can be somewhat of roller coaster, sometimes tearful sometimes teary.  Not as extreme as they had been.    She had better energy with iron supplementation.    Feels she is managing stress a little bit better.    Continues working with her psychotherapist.    Sleep with the mood changes can sometimes wake after 2 hours.    She asked whether she can resume the medical cannabis now that her restless legs are doing better, previously wonder if that was a side effect from it.  Medical cannabis was helpful for her overall pain.      Current Outpatient Medications:      albuterol (PROAIR HFA;PROVENTIL HFA;VENTOLIN HFA) 90 mcg/actuation inhaler, Inhale 2 puffs every 4 (four) hours as needed., Disp: 8.5 g, Rfl: 12     albuterol (PROVENTIL) 2.5 mg /3 mL (0.083 %) nebulizer solution, Take 3 mL (2.5 mg total) by nebulization every 6 (six) hours as needed for wheezing., Disp: 75 mL, Rfl: 12     aspirin 81 MG EC tablet, Take 81 mg by mouth daily., Disp: , Rfl:      calcium carb/mag carb/folic ac (MAGNESIUM-CALCIUM-FOLIC ACID ORAL), Take by mouth., Disp: , Rfl:      cholecalciferol, vitamin D3, (VITAMIN D3) 5,000 unit Tab, Take 10,000 Units by mouth daily., Disp: , Rfl:      cyanocobalamin, vitamin B-12, 5,000 mcg/mL Drop, Take 5,000 mcg by mouth daily. , Disp: , Rfl:      fluticasone-umeclidinium-vilanterol (TRELEGY ELLIPTA) 100-62.5-25 mcg DsDv inhaler, Inhale 1 Inhalation daily., Disp: 3 each, Rfl: 3     furosemide (LASIX) 20 MG tablet, Take 1 tablet (20 mg total) by mouth daily., Disp: 180 tablet, Rfl: 3     iron,carbonyl-vitamin C (VITRON-C) 65 mg iron- 125 mg TbEC, Take 1 tablet by mouth daily., Disp: , Rfl:      ketorolac (ACULAR) 0.5 % ophthalmic solution, Place 1 drop into left eye three times a day as directed.  Start 3 days before surgery, Disp: , Rfl:      KLOR-CON M20 20 mEq tablet, Take 1 tablet (20 mEq total) by mouth 2 (two) times a day., Disp: 180 tablet,  Rfl: 3     levothyroxine (SYNTHROID, LEVOTHROID) 150 MCG tablet, Take 1 tablet (150 mcg total) by mouth daily. TAY SYNTHROID, Disp: 90 tablet, Rfl: 2     losartan (COZAAR) 25 MG tablet, Take 1 tablet (25 mg total) by mouth daily., Disp: 90 tablet, Rfl: 3     ofloxacin (OCUFLOX) 0.3 % ophthalmic solution, Instill 1 drop into left eye three times a day as directed. Start 3 days before surgery, Disp: , Rfl:      omeprazole (PRILOSEC) 20 MG capsule, TAKE 1 CAPSULE BY MOUTH 2 TIMES A DAY, Disp: 180 capsule, Rfl: 2     prednisoLONE acetate (PRED-FORTE) 1 % ophthalmic suspension, place 1 drop into the left eye three times a day as directed. start 3 days before surgery., Disp: , Rfl:      rOPINIRole (REQUIP) 2 MG tablet, Take 1 tablet (2 mg total) by mouth at bedtime., Disp: 30 tablet, Rfl: 1     losartan (COZAAR) 25 MG tablet, Take 25 mg by mouth daily., Disp: , Rfl:      pimecrolimus (ELIDEL) 1 % cream, Starting day 11: apply topically to affected area on eyelids and ears Twice a day, except Wed. and Sat. ongoing Externally 30 days, Disp: , Rfl:     Current Facility-Administered Medications:      albuterol nebulizer solution 2.5 mg (PROVENTIL), 2.5 mg, Inhalation, Once, Loida Stockton MD     cyanocobalamin injection 1,000 mcg, 1,000 mcg, Intramuscular, Q30 Days, Loida Stockton MD, 1,000 mcg at 05/28/19 0912  By video alert, clear sensorium.  Thought process logical.  Affect is wider range and she appears much more stable than when last seen.      Additional provider notes:     Review of Systems        Objective    Vitals - Patient Reported  Pain Score:   8    Physical Exam    Assessment: Regarding her mood disorder, does appear to still have some cycling but much narrower range.  The restless leg complaint is improving.    Continues actively working with her psychotherapist that there was some stresses noted    Plan: We have previously tried lithium as a mood stabilizer, concerned she had exacerbation of  "restless legs.  Will change the lithium carbonate to the supplement lithium orotate which may have less side effects and start with 10 mg at bedtime.    She will advance the magnesium as tolerated which also seems to be helping sleep and restless legs.    She will resume the medical cannabis to see if it helps with pain complaints.    Total time more than 20 minutes          Video-Visit Details    Type of service:  Video Visit    Video End Time (time video stopped):   Originating Location (pt. Location): Home    Distant Location (provider location):  [facility]     Platform used for Video Visit: Tara               Objective:     Vitals:    02/02/21 1530   Weight: 220 lb (99.8 kg)   Height: 5' 5.5\" (1.664 m)   PainSc:   8                 This note has been dictated using voice recognition software. Any grammatical or context distortions are unintentional and inherent to the software  "

## 2021-06-14 NOTE — PROGRESS NOTES
Clinic Care Coordination Contact    Community Health Worker Follow Up    Goals:   Goals Addressed                 This Visit's Progress       Patient Stated      Mental Health Management (pt-stated)   20%     Goal Statement: I would like to have my depression under better control in the next 6 months.   Date Goal set: 12/29/20  Barriers: Long history of depression  Strengths: Strong advocate for herself  Date to Achieve By: 5/29/21  Patient expressed understanding of goal: Yes  Action steps to achieve this goal:  1. I will continue to attend all scheduled appointments with Dr. Dubois and follow up on his recommendations.   2. I will take my antidepressant ordered by Dr. Dubois as prescribed.   3. I will continue to attend all scheduled appointments with my therapist, Corine.  4. I will consider returning to swimming at the [employer] as I know this helps me both physically and mentally.   5. I will continue to participate in activities that I enjoy and keeps me busy at home.   6. I will continue to report progress towards this goals at scheduled outreach telephone calls from the [facility] team.     1/27/2021- Updated and discussed            Transportation (pt-stated)   On Hold     Goal Statement- I would like to apply for [employer] within the next 6 months.  Date Goal set: 12/29/20  Barriers: Limited transportation options  Strengths: Strong advocate for herself.   Date to Achieve By: 5/29/21  Patient expressed understanding of goal: Yes  Action steps to achieve this goal  1.  The [facility] RN, Bautista will mail me the application and information for [employer].  2.  I will review the application and complete the Certification Questionnaire section. I will bring the application to my appointment with Dr. Stockton on 1/6/21.  3.  Dr. Stockton will complete the Professional Verification Form section of the application and will coordinate mailing both sections in.  4. I will notify the Community Health Worker once I  find out if I am approved or denied [employer].     1/27/21- Discussed with patient, per patient PCP does not feel that pt filled out application correctly- patient would like to place this on hold at this time                 CHW Next Follow-up: 2/19/2021    CHW Plan: To follow up with patient in 3 weeks to touch in and see how she is doing, track and update goal progression.    Winnie and CHW discussed her goal around MM- Patient states she turned paperwork into PCP and was told that she filled the paperwork out incorrectly as she based it on her shoulder injury rather than her cataract surgery, at this time patient's  has been driving patient to all of her appointments as patient does not know where else to turn declined CC follow up on this at this time.  Patient informed she had her first eye surgery and second eye is scheduled 2/10/2021.  Patient does have an appt with Dr. Moise's PA at [facility] on 1/28/2021- Dr. Moise is on medical leave per patient. Patient has been getting the run around as she has been to a few ortho appointments however they have been bringing up stuff from yeats ago and not focusing on her current shoulder pain as she hurt her R shoulder getting out of the bathtub.    Winnie states her depression has been up and down, she continues to journal when she is feeling overwhelmed.  Patient is trying her best to stay on top of all of her medical appointment- she has a colonoscopy scheduled 2/3/2021    Patient has no other concerns and very thankful for the call and follow up today.

## 2021-06-14 NOTE — TELEPHONE ENCOUNTER
Patient's call.  Symptoms are not progressing.  She took her first dose of riluzole today.  Her  is home with her.  She will take some Benadryl.  We will reassess her appointment next week, she is aware to call 911 if symptoms progress

## 2021-06-14 NOTE — PROGRESS NOTES
Patient enrolled with Clinic Care Coordination on 12/29/2020  Goals were established and discussed.  Plan: [facility] RN will continue monitor, send [employer] Application to patient, support patient with current goals and will be available as nursing needs arise. [facility] CHW will support patient with current goals through scheduled outreach telephone calls.      Next Outreach: 1/21/2021      Goals        Patient Stated      Mental Health Management (pt-stated)      Goal Statement: I would like to have my depression under better control in the next 6 months.   Date Goal set: 12/29/20  Barriers: Long history of depression  Strengths: Strong advocate for herself  Date to Achieve By: 5/29/21  Patient expressed understanding of goal: Yes  Action steps to achieve this goal:  1. I will continue to attend all scheduled appointments with Dr. Dubois and follow up on his recommendations.   2. I will take my antidepressant ordered by Dr. Dubois as prescribed.   3. I will continue to attend all scheduled appointments with my therapist, Corine.  4. I will consider returning to swimming at the [employer] as I know this helps me both physically and mentally.   5. I will continue to participate in activities that I enjoy and keeps me busy at home.   6. I will report progress towards this goals at scheduled outreach telephone calls from the [facility] team.           Transportation (pt-stated)      Goal Statement- I would like to apply for [employer] within the next 6 months.  Date Goal set: 12/29/20  Barriers: Limited transportation options  Strengths: Strong advocate for herself.   Date to Achieve By: 5/29/21  Patient expressed understanding of goal: Yes  Action steps to achieve this goal  1.  The [facility] RN, Bautista will mail me the application and information for [employer].  2.  I will review the application and complete the Certification Questionnaire section. I will bring the application to my appointment with Dr. Stockton on  1/6/21.  3.  Dr. Stockton will complete the Professional Verification Form section of the application and will coordinate mailing both sections in.  4. I will notify the Community Health Worker once I find out if I am approved or denied [employer].
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
## 2021-06-14 NOTE — PROGRESS NOTES
Randi Damon is a 65 y.o. female who is being evaluated via a billable video visit.      How would you like to obtain your AVS? MyChart.  If dropped from the video visit, the video invitation should be resent by: Send to e-mail at: [email or web address]  Will anyone else be joining your video visit? No     Randi Damon is 65 y.o. and presents to clinic today for the following health issues     Patient is here for a follow up appointment with Dr. Dubois for focus on mental health,rates the pain as 8/10. Patient needs refills for Requip.     Pamela Ayers LPN

## 2021-06-14 NOTE — PROGRESS NOTES
Mental Health Visit Note    11/16/2017   Start time: 1400    Stop Time: 1450   Session # 13    Randi Damon is a 62 y.o. female is being seen today for    Chief Complaint   Patient presents with     Mental Health Visit   .     New symptoms or complaints: Requested  staff to have patient complete updated treatment plan assessments prior to session, they did not follow through.  Did not have a chance to update during session.    Functional Impairment:   Personal: 4  Family: 4  Work: 4  Social:4    Clinical assessment of mental status: Patient presented alert and oriented x3.  She was well-groomed.  Her attire was appropriate.  Patient was cooperative.  Patient motor actively was normal and eye contact appropriate.  Patients mood was anxious and affect congruent.  Patient s speech and language was rapid.  Patient attention and thought process tangential .  Concentration was brief.  Patient denied delusions or hallucinations. Patient denied suicidal or homicidal ideation.  Patient denied any long or short term memory problems. No evidence of impairment in judgment.    She has insight and fund of knowledge was adequate.        Suicidal/Homicidal Ideation present: None Reported This Session    Patient's impression of their current status: Pt reports significant psychosocial stressors and medication changes causing increase in mental health symptoms.     Therapist impression of patients current state: Pt reports she decided to make changes to her psychiatric medication and she appears to have an increase in hypomanic/manic symptoms (impulsive behavior, rapid speech and racing thoughts). Discussed importance of medication stability and awareness of how rapid changes impacts her mood.  Encouraged her to see therapist within [facility].  Discussed emotional regulation, impulse control and self-care strategies.  She is preparing to leave on vacation and is having some relationship conflict causing  increase in her stress.  Discussed communication skills and ability to verbalize her feelings appropriately.      Type of psychotherapeutic technique provided: Insight oriented, Client centered, Solution-focused and CBT    Progress toward short term goals:Progress as expected, emotional regulation, impulse control, communication skills    Review of long term goals: Date of last review 7/31/2017    Diagnosis:   1. Bipolar affective disorder, currently depressed, moderate    2. Generalized anxiety disorder    3. Chronic post-traumatic stress disorder (PTSD)        Plan and Follow up: Practice emotional regulation/coping strategies as discussed  Try to avoid medication changes without communicating with all providers  Take medications as prescribed  Practice communication strategies as discussed  Follow up with Mabel in 2 weeks      Discharge Criteria/Planning: Patient will continue with follow-up until therapy can be discontinued without return of signs and symptoms.    Shanice Merida 11/30/2017      This note was created with help of Dragon dictation software. Grammatical / typing errors are not intentional and inherent to the software.

## 2021-06-14 NOTE — PROGRESS NOTES
"Assessment/Plan:     Problem List Items Addressed This Visit     Restless Legs Syndrome - Primary    Relevant Medications    rOPINIRole (REQUIP) 2 MG tablet              No follow-ups on file.    Patient Instructions   PLAN:  You are working with the sleep center to optimize your CPAP device.    You will be starting supplements for adrenal support such as Ashwagandha    We will be starting magnesium supplementation to help with sleep and restless legs.    Continue the riluzole 2 mg at bedtime.    You were adjusting the thyroid medicines with your primary care provider.    You are scheduled for cataract surgery next week.    You will continue observe for concerns with mood swings.    You continue with your psychotherapist.    Follow-up with Dr. Dubois in 8 weeks.        Subjective:       65 y.o. female Randi Damon is a 65 y.o. female who is being evaluated via a billable video visit.      How would you like to obtain your AVS? MyChart.  If dropped from the video visit, the video invitation should be resent by:   Will anyone else be joining your video visit?       Video Start Time:       Subjective     Randi Damon is 65 y.o. and presents to clinic today for the following health issues   HPI   there have been multiple [facility] messages and discussions about patient's physical and medical symptoms.    Care Everywhere indicates a visit 12/18, to the [facility] emergency room.  I did speak with the  at that time where she appeared quite agitated we discussed hospitalization.  They were unable to find a place.    She did see her psychotherapist 12/31 Chapin Flores.  Symptoms sharing some things about trauma.    Video she describes on 1/2, feeling \"really good\".  She reviewed having more energy and doing things.  Unclear if this related to hypomania and bipolar swings.  Some ways reminded her when she used to do \"white classes\" intersubstance using days.  She did not think she crashed out, rather " "\"mellowed a bit.  Did not have the rage and anger she has had with other episodes.    She does have cataract surgery scheduled on 1/14 and thinks she will feel better when she is able to see better.    She had called with a trial of really is all Compazine some breathing problems.  She got some Benadryl and help that.  She did not take lamotrigine which we have discussed in the past, unsure if she had) past reactions.    Her restless leg symptoms have varied, the Requip is sometimes helpful, other times not yet, taking perhaps 75% improvement.    The magnesium supplement will be coming.  She did try some Epson salt bath.    She will be also taking some adrenal supplements she is contacted.    She is using iron replacement review some symptoms.    Reviewed again cautioned about her serotonin syndrome she had this summer.    She is on thyroid supplementation, always feels cold, her dose was decreased from 1 75-1 50.  Reviewed she had 2 episodes of radioactive iodine.    She continues working with a therapist, feels in some ways more clear headed.  She describes some of the issues she has been working on.    She is working with the sleep center, they are trying to optimize her CPAP which she is not using presently.      Current Outpatient Medications:      albuterol (PROAIR HFA;PROVENTIL HFA;VENTOLIN HFA) 90 mcg/actuation inhaler, Inhale 2 puffs every 4 (four) hours as needed., Disp: 8.5 g, Rfl: 12     albuterol (PROVENTIL) 2.5 mg /3 mL (0.083 %) nebulizer solution, Take 3 mL (2.5 mg total) by nebulization every 6 (six) hours as needed for wheezing., Disp: 75 mL, Rfl: 12     aspirin 81 MG EC tablet, Take 81 mg by mouth daily., Disp: , Rfl:      clobetasoL (TEMOVATE) 0.05 % cream, Mix 1:1 with Eucerin repair cream and apply to hands following hydrotherapy instructions daily at bedtime, repeat as needed for flares Exter, Disp: , Rfl:      cyanocobalamin, vitamin B-12, 5,000 mcg/mL Drop, Take 5,000 mcg by mouth daily. , " Disp: , Rfl:      fluticasone-umeclidinium-vilanterol (TRELEGY ELLIPTA) 100-62.5-25 mcg DsDv inhaler, Inhale 1 Inhalation daily., Disp: 3 each, Rfl: 3     furosemide (LASIX) 20 MG tablet, Take 2 tablets (40 mg total) by mouth daily., Disp: 180 tablet, Rfl: 3     KLOR-CON M20 20 mEq tablet, Take 1 tablet (20 mEq total) by mouth 2 (two) times a day., Disp: 180 tablet, Rfl: 3     levothyroxine (SYNTHROID, LEVOTHROID) 150 MCG tablet, Take 1 tablet (150 mcg total) by mouth daily. TAY SYNTHROID, Disp: 90 tablet, Rfl: 2     losartan (COZAAR) 25 MG tablet, Take 25 mg by mouth daily., Disp: , Rfl:      omeprazole (PRILOSEC) 20 MG capsule, TAKE 1 CAPSULE BY MOUTH 2 TIMES A DAY, Disp: 180 capsule, Rfl: 2     pimecrolimus (ELIDEL) 1 % cream, Starting day 11: apply topically to affected area on eyelids and ears Twice a day, except Wed. and Sat. ongoing Externally 30 days, Disp: , Rfl:      rOPINIRole (REQUIP) 2 MG tablet, Take 1 tablet (2 mg total) by mouth at bedtime., Disp: 30 tablet, Rfl: 0     UNABLE TO FIND, Take 1 tablet by mouth daily. Med Name: Vitamin C with Iron, Disp: , Rfl:      buPROPion (WELLBUTRIN SR) 100 MG 12 hr tablet, Take 1 tablet (100 mg total) by mouth daily., Disp: 30 tablet, Rfl: 2     calcium carb/mag carb/folic ac (MAGNESIUM-CALCIUM-FOLIC ACID ORAL), Take by mouth., Disp: , Rfl:      cholecalciferol, vitamin D3, (VITAMIN D3) 5,000 unit Tab, Take 10,000 Units by mouth daily., Disp: , Rfl:      diazePAM (VALIUM) 2 MG tablet, Take 1 tablet (2 mg total) by mouth every 8 (eight) hours as needed., Disp: 60 tablet, Rfl: 1     divalproex (DEPAKOTE DR) 250 MG 12 hour tablet, One bedtime for five nights, may increase to two bedtime, Disp: 30 tablet, Rfl: 1     iron,carbonyl-vitamin C (VITRON-C) 65 mg iron- 125 mg TbEC, Take 1 tablet by mouth daily., Disp: , Rfl:      ketorolac (ACULAR) 0.5 % ophthalmic solution, Place 1 drop into left eye three times a day as directed.  Start 3 days before surgery, Disp: , Rfl:       lamoTRIgine (LAMICTAL) 25 MG tablet, One daily for two weeks, one twice a day, Disp: 60 tablet, Rfl: 0     levothyroxine (SYNTHROID, LEVOTHROID) 175 MCG tablet, Take 1 tablet daily for 6 days of the week, 7th day PRN (Patient taking differently: 175 mcg daily. ), Disp: 14 tablet, Rfl: 0     lithium 150 MG capsule, Take 1 capsule (150 mg total) by mouth 3 (three) times a day with meals., Disp: 90 capsule, Rfl: 2     medical cannabis (Patient's own supply), by Other route see administration instructions. (The purpose of this order is to document that the patient reports taking medical cannabis. This is not a prescription, and is not used to certify that the patient has a  qualifying medical condition.)  THC- Red Tinsure- Green/Yellow/ Red capsules CBD sublingual spray, Disp: , Rfl:      ofloxacin (OCUFLOX) 0.3 % ophthalmic solution, Instill 1 drop into left eye three times a day as directed. Start 3 days before surgery, Disp: , Rfl:      pramipexole (MIRAPEX) 0.5 MG tablet, Take 1 tablet (0.5 mg total) by mouth at bedtime., Disp: 30 tablet, Rfl: 2     prednisoLONE acetate (PRED-FORTE) 1 % ophthalmic suspension, place 1 drop into the left eye three times a day as directed. start 3 days before surgery., Disp: , Rfl:     Current Facility-Administered Medications:      albuterol nebulizer solution 2.5 mg (PROVENTIL), 2.5 mg, Inhalation, Once, Loida Stockton MD     cyanocobalamin injection 1,000 mcg, 1,000 mcg, Intramuscular, Q30 Days, Loida Stockton MD, 1,000 mcg at 05/28/19 0912    By video sounds alert, clear sensorium.  Thought process logical.  Affect is much more congruent.      Additional provider notes:     Review of Systems        Objective    Vitals - Patient Reported  Pain Score:   7  Pain Loc: Generalized(focus on mental health)    Physical Exam              Video-Visit Details    Type of service:  Video Visit    Video End Time (time video stopped):   Originating Location (pt. 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  She has now discontinued off the opioids.    Unclear history of bipolar spectrum disorder, noting an episode of serotonin syndrome medication combined this summer.    Has been struggling with restless leg syndrome, with anemia on iron replacement.    Comorbid sleep apnea.    Plan: As above.    Noting that her reverse T3 was elevated, may consider adding Cytomel in the very low doses to augment the Synthroid after her cataract surgery.    Total time more than 40 minutes.        This note has been dictated using voice recognition software. Any grammatical or context distortions are unintentional and inherent to the software  "

## 2021-06-14 NOTE — PATIENT INSTRUCTIONS - HE
PLAN:  May increase your magnesium supplement up to 2 tablets 3 times a day to see if it helps for muscle cramps.  Decrease of have loose stools.    Continue the Requip 2 mg at bedtime.    You may resume the medical cannabis to see if it helps with pain and does not cause restless leg syndrome.    May obtain the supplement lithium orotate, 10 mg at bedtime, to see if it helps with mood stabilization.    Continue working with your psychotherapist.    You are working with your previous orthopedist around arm pain.    Follow-up with Dr. Dubois in 6 to 8 weeks.

## 2021-06-14 NOTE — PATIENT INSTRUCTIONS - HE
PLAN:  You are working with the sleep center to optimize your CPAP device.    You will be starting supplements for adrenal support such as Ashwagandha    We will be starting magnesium supplementation to help with sleep and restless legs.    Continue the riluzole 2 mg at bedtime.    You were adjusting the thyroid medicines with your primary care provider.    You are scheduled for cataract surgery next week.    You will continue observe for concerns with mood swings.    You continue with your psychotherapist.    Follow-up with Dr. Dubois in 8 weeks.

## 2021-06-15 NOTE — PROGRESS NOTES
Assessment/Plan:     Problem List Items Addressed This Visit     None      Visit Diagnoses     Cervical radiculopathy    -  Primary    Relevant Medications    OXcarbazepine (TRILEPTAL) 150 MG tablet              No follow-ups on file.    Patient Instructions   PLAN:  You are working with the orthopedists to have an EMG for your arm and consider a different injection.    Trileptal to help with the numbness and burning down your arm, 1 to 50 mg daily for 3 days, twice a day for 3 days then 3 times a day.    Discussed you adjusting the way you take medications including the magnesium, lithium orotate.  Continue with Requip 2 mg at bedtime.    Follow-up with Dr. Dubois in 6 to 8 weeks.        Subjective:       65 y.o. female Randi Damon is a 65 y.o. female who is being evaluated via a billable video visit.      How would you like to obtain your AVS? MyChart.  If dropped from the video visit, the video invitation should be resent by:   Will anyone else be joining your video visit?       Video Start Time:         Subjective   Randi Damon is 65 y.o. and presents today for the following health issues   HPI     been followed for mood disorder.    She continues working with [facility] around pain with her neck down her right arm.  It sounds as if she had a intravertebral injection which did not work, is scheduled for a EMG and may have a transforaminal approach.    She describes a lithium orotate seemed to keep her stimulated at night and is change it in the morning.  Is also decreasing her magnesium and continues on thyroid medication.  With the Requip 2 mg at night she is sleeping better without restless leg still has some during the day.  On this regimen she is doing relatively better in terms of her mood.    She is looking forward to returning to the swimming pool soon where she expects that will help things go better.      Current Outpatient Medications:      albuterol (PROAIR HFA;PROVENTIL  HFA;VENTOLIN HFA) 90 mcg/actuation inhaler, Inhale 2 puffs every 4 (four) hours as needed., Disp: 8.5 g, Rfl: 12     albuterol (PROVENTIL) 2.5 mg /3 mL (0.083 %) nebulizer solution, Take 3 mL (2.5 mg total) by nebulization every 6 (six) hours as needed for wheezing., Disp: 75 mL, Rfl: 12     aspirin 81 MG EC tablet, Take 81 mg by mouth daily., Disp: , Rfl:      cholecalciferol, vitamin D3, (VITAMIN D3) 5,000 unit Tab, Take 10,000 Units by mouth daily., Disp: , Rfl:      cyanocobalamin, vitamin B-12, 5,000 mcg/mL Drop, Take 5,000 mcg by mouth daily. , Disp: , Rfl:      fluticasone-umeclidinium-vilanterol (TRELEGY ELLIPTA) 100-62.5-25 mcg DsDv inhaler, Inhale 1 Inhalation daily., Disp: 3 each, Rfl: 3     furosemide (LASIX) 20 MG tablet, Take 1 tablet (20 mg total) by mouth daily., Disp: 180 tablet, Rfl: 3     iron,carbonyl-vitamin C (VITRON-C) 65 mg iron- 125 mg TbEC, Take 1 tablet by mouth daily., Disp: , Rfl:      ketorolac (ACULAR) 0.5 % ophthalmic solution, Place 1 drop into left eye three times a day as directed.  Start 3 days before surgery, Disp: , Rfl:      KLOR-CON M20 20 mEq tablet, Take 1 tablet (20 mEq total) by mouth 2 (two) times a day., Disp: 180 tablet, Rfl: 3     levothyroxine (SYNTHROID, LEVOTHROID) 150 MCG tablet, Take 1 tablet (150 mcg total) by mouth daily. TAY SYNTHROID, Disp: 90 tablet, Rfl: 2     losartan (COZAAR) 25 MG tablet, Take 1 tablet (25 mg total) by mouth daily., Disp: 90 tablet, Rfl: 3     ofloxacin (OCUFLOX) 0.3 % ophthalmic solution, Instill 1 drop into left eye three times a day as directed. Start 3 days before surgery, Disp: , Rfl:      omeprazole (PRILOSEC) 20 MG capsule, TAKE 1 CAPSULE BY MOUTH 2 TIMES A DAY, Disp: 180 capsule, Rfl: 2     prednisoLONE acetate (PRED-FORTE) 1 % ophthalmic suspension, place 1 drop into the left eye three times a day as directed. start 3 days before surgery., Disp: , Rfl:      rOPINIRole (REQUIP) 2 MG tablet, Take 1 tablet (2 mg total) by mouth at  bedtime., Disp: 30 tablet, Rfl: 1     UNABLE TO FIND, Take 10 mg by mouth daily. Med Name: Lithium orotate, Disp: , Rfl:      UNABLE TO FIND, Take 6 tablets by mouth daily. Med Name: reacted behzad-mag supplement - 200 mg calcium, 40 phosphorus, 175 magnesium, Disp: , Rfl:      UNABLE TO FIND, Take 500 mg by mouth daily. Med Name: Tumeric, Disp: , Rfl:      UNABLE TO FIND, Take 1 tablet by mouth daily. Med Name: methyl-B complex, Disp: , Rfl:      calcium carb/mag carb/folic ac (MAGNESIUM-CALCIUM-FOLIC ACID ORAL), Take by mouth., Disp: , Rfl:      losartan (COZAAR) 25 MG tablet, Take 25 mg by mouth daily., Disp: , Rfl:      OXcarbazepine (TRILEPTAL) 150 MG tablet, One daily for 3 days, one twice a day 3 days, then one three times a day, Disp: 45 tablet, Rfl: 2     pimecrolimus (ELIDEL) 1 % cream, Starting day 11: apply topically to affected area on eyelids and ears Twice a day, except Wed. and Sat. ongoing Externally 30 days, Disp: , Rfl:     Current Facility-Administered Medications:      albuterol nebulizer solution 2.5 mg (PROVENTIL), 2.5 mg, Inhalation, Once, Loida Stockton MD     cyanocobalamin injection 1,000 mcg, 1,000 mcg, Intramuscular, Q30 Days, Loida Stockton MD, 1,000 mcg at 05/28/19 0912    She is alert, clear sensorium.  Thought process logical.  Affect is much wider, she is much less distressed.      Additional provider notes:     Review of Systems        Objective    Vitals - Patient Reported  Pain Score:   8  Pain Loc: Generalized(focus on mental health)    Physical Exam      Assessment: Mood disorder, with episode of encephalopathy, serotonin syndrome this summer, restless leg syndrome several medical concerns is doing relatively better at this time.    She is working with orthopedics for cervical radiculopathy quite distressed for the pain and requests we could do.    Plan: Discussed the use of Trileptal, reassured to has little serotonergic activity compared to the other agents she was  "using the summer.  Discussed its use and will start with 100 mg daily increasing gradually to 3 times a day and assess.    Total time more than 15 minutes.        Video-Visit Details    Type of service:  Video Visit    Video End Time (time video stopped):   Originating Location (pt. Location): Home    Distant Location (provider location):  [facility]     Platform used for Video Visit: Tara           Objective:     Vitals:    03/02/21 1253   Height: 5' 6\" (1.676 m)   PainSc:   8   PainLoc: Generalized                   This note has been dictated using voice recognition software. Any grammatical or context distortions are unintentional and inherent to the software  "

## 2021-06-15 NOTE — PROGRESS NOTES
CHW reviewed CC chart note from CC RN on 2/8/2021- Goals were discussed and updated.  Delegation:  Plan: CCC RN will continue to monitor, support patient with current goal and will be available to assist as nursing needs arise.      Care Coordinator will follow up in one month.     Next CHW outreach: 3/16/2021    Goals        Patient Stated      Mental Health Management (pt-stated)      Goal Statement: I would like to have my depression under better control in the next 6 months.   Date Goal set: 12/29/20  Barriers: Long history of depression  Strengths: Strong advocate for herself  Date to Achieve By: 5/29/21  Patient expressed understanding of goal: Yes  Action steps to achieve this goal:  1. I will continue to attend all scheduled appointments with Dr. Dubois and follow up on his recommendations.   2. I will take my medications ordered by Dr. Dubois as prescribed.   3. I will continue to attend all scheduled appointments with my therapist, Corine.  4. I will consider returning to swimming at the [facility] as I know this helps me both physically and mentally.   5. I will continue to participate in activities that I enjoy and keeps me busy at home.   6. I will continue to report progress towards this goals at scheduled outreach telephone calls from the [facility] team.     1/27/2021- Updated and discussed  2/8/21 - Updated and discussed.

## 2021-06-15 NOTE — PROGRESS NOTES
[facility] Follow Up Note    Assessment/Plan:  1. Cervical stenosis of spinal canal  Significant cervical stenosis.  The notes from [facility] reviewed.  Status post nerve root injection  Awaiting response.  It appears that the orthopedic surgeons are trying to discern pain from shoulders versus C-spine.  She is adamantly opposed to having surgery    2. Chronic pain of both shoulders  As above-sees Dr. Suma rivas.  Has chronic pain in shoulders as well as neck    3. Chronic intractable pain  Dr. Dubois.  On no narcotics currently    4. Postablative hypothyroidism  Labs reviewed from February-elevated T4.  Recently started on lithium.  Recommendation: We will recheck thyroid cascade  - Thyroid Cascade    5. Loose stools  Light yellow stools-patient worried about pancreatitis-exam completely unremarkable  Recommendation: Could be secondary to magnesium preparation.  Have asked her to talk to her pain doctor about this.  Consider addition of a probiotic.  Avoid unnecessary supplements such as vitamin C and vitamin D    6. Type 2 diabetes mellitus without complication, without long-term current use of insulin (H)  Stable A1c.    7. Hereditary hemochromatosis (H)  Her hematologist from [facility] has left.  Of note, she has recently had an EGD and colonoscopy because she has not required phlebotomy in some time.  Of note, her labs were stable for several years in the past.  She has no signs of GI bleeding.  Her hemoglobin is 17.  Her hematologist placed her on iron and vitamin C.  Recommendation: Discontinue iron and vitamin C.  Recheck labs today.  Get established with [facility] hematologist for opinion on management of hereditary hemochromatosis.  - HM1(CBC and Differential)  - Ferritin  - Ambulatory referral to Oncology/Hematology Adult    8. Vitamin D deficiency  Check labs  - Vitamin D, Total (25-Hydroxy)    9. Pulmonary hypertension (H)  Seeing her cardiologist next  week    10. Restless Legs Syndrome  Markedly improved with magnesium per pain clinic    11. High risk medication use  To talk to pharmacy with regard to her medication schedule and her supplement schedule in relation to prescription meds  - Ambulatory referral to Medication Management    - Lithium        Follow-up in 2 month    Loida Stockton MD    Chief Complaint:  Chief Complaint   Patient presents with     Follow-up       History of Present Illness:  Randi is a 65 y.o. female is here today for follow-up of a multitude of problems.  First, she is dealing with pain issues.  She is followed by Dr. Dubois at the pain clinic.  She has severe cervical stenosis.  She is working with physicians at [facility] as well.  She is status post epidurals as well as a nerve root injection recently.  The orthopedist is trying to discern pain emanating from the C-spine versus the shoulder per se.  She is also going to be seeing Dr. Moise.  She is very adamantly opposed to having any surgical intervention for neck pain.     Next, we have reviewed her hemochromatosis.  She was sent by the [facility] hematologist for upper and lower endoscopies as she has not required phlebotomy in several months perhaps even a year.  Of note, this was her pattern in the past.  She had been very stable with regard to her numbers for hemochromatosis.  Nonetheless, she underwent upper and lower endoscopy.  No source of GI bleeding or iron loss was noted.  Of note, she had previously been put on iron and vitamin C by her hematologist who has since left the system.  Her last hemogram revealed hemoglobin of 17 with an elevated hematocrit.  I did ask her to discontinue her iron supplementation.  She states she felt very much physically better on this.    Mental health issues: She is working with Dr. Dubois with regard to her depression and bipolar disorder.  He has her on lithium orotate.  She is taking 10 mg.  She is very concerned  "about lithium and its interaction with other medications.  Additionally, she would like to try to \"do it on my own\".  She thinks that she can manage her mental health issues without medication, however, she exhibits extreme irritability, mood swings and is very difficult at home such that her marriage is having significant difficulty.  She is working with the therapist regularly.  She is very worried about idea of having another episode of serotonin syndrome.    With regard to type 2 diabetes, things are stable    Her blood pressure and vitals have been stable on a low-dose of losartan.    She has managed to lose some weight.  She is working hard to eat better.  She will hopefully resume pool exercises.    She is taking a lot of supplements and complains of yellow liquidy stool.  She is worried that she has pancreatitis.  She googled this.  Of note, she does not have any vomiting or abdominal pain.  She is taking a variety of supplements some recommended by her physicians and others she is taking on her own.  She is taking vitamin C and vitamin D.  She is taking magnesium and turmeric.  The magnesium is helping greatly for her restless legs.    Social history: She continues to have marital difficulties due to lability of mood    Review of Systems:  A comprehensive review of systems was performed and was otherwise negative    PFSH:  Social History:  with no children  Social History     Tobacco Use   Smoking Status Former Smoker     Quit date: 2003     Years since quittin.6   Smokeless Tobacco Never Used       Past History:   Past Medical History:   Diagnosis Date     Anxiety      Breast cancer (H)      Chronic pain syndrome      COPD (chronic obstructive pulmonary disease) (H)      Depression      Esophageal reflux      Graves disease      Hemochromatosis      Hx antineoplastic chemotherapy      Hx of radiation therapy      Hypertension      Impaired fasting glucose      Pheochromocytoma      " Psoriasis      Psoriatic arthropathy (H)      Restless leg syndrome      Right rotator cuff tear      Serotonin syndrome 8/28/2020     Sleep apnea 2017    CPAP     Spinal stenosis      Urinary incontinence      Vitamin B12 deficiency        Current Outpatient Medications   Medication Sig Dispense Refill     albuterol (PROAIR HFA;PROVENTIL HFA;VENTOLIN HFA) 90 mcg/actuation inhaler Inhale 2 puffs every 4 (four) hours as needed. 8.5 g 12     albuterol (PROVENTIL) 2.5 mg /3 mL (0.083 %) nebulizer solution Take 3 mL (2.5 mg total) by nebulization every 6 (six) hours as needed for wheezing. 75 mL 12     aspirin 81 MG EC tablet Take 81 mg by mouth daily.       cholecalciferol, vitamin D3, (VITAMIN D3) 5,000 unit Tab Take 10,000 Units by mouth daily.       cyanocobalamin, vitamin B-12, 5,000 mcg/mL Drop Take 5,000 mcg by mouth daily.        fluticasone-umeclidinium-vilanterol (TRELEGY ELLIPTA) 100-62.5-25 mcg DsDv inhaler Inhale 1 Inhalation daily. 3 each 3     furosemide (LASIX) 20 MG tablet Take 1 tablet (20 mg total) by mouth daily. 180 tablet 3     ketorolac (ACULAR) 0.5 % ophthalmic solution Place 1 drop into left eye three times a day as directed.  Start 3 days before surgery       KLOR-CON M20 20 mEq tablet Take 1 tablet (20 mEq total) by mouth 2 (two) times a day. 180 tablet 3     levothyroxine (SYNTHROID, LEVOTHROID) 150 MCG tablet Take 1 tablet (150 mcg total) by mouth daily. TAY SYNTHROID 90 tablet 2     losartan (COZAAR) 25 MG tablet Take 1 tablet (25 mg total) by mouth daily. 90 tablet 3     ofloxacin (OCUFLOX) 0.3 % ophthalmic solution Instill 1 drop into left eye three times a day as directed. Start 3 days before surgery       omeprazole (PRILOSEC) 20 MG capsule TAKE 1 CAPSULE BY MOUTH 2 TIMES A  capsule 2     prednisoLONE acetate (PRED-FORTE) 1 % ophthalmic suspension place 1 drop into the left eye three times a day as directed. start 3 days before surgery.  0     UNABLE TO FIND Take 10 mg by mouth  daily. Med Name: Lithium orotate       UNABLE TO FIND Take 6 tablets by mouth daily. Med Name: reacted behzad-mag supplement - 200 mg calcium, 40 phosphorus, 175 magnesium       UNABLE TO FIND Take 500 mg by mouth daily. Med Name: Tumeric       UNABLE TO FIND Take 1 tablet by mouth daily. Med Name: methyl-B complex       methocarbamoL (ROBAXIN) 500 MG tablet        rOPINIRole (REQUIP) 2 MG tablet Take 1 tablet (2 mg total) by mouth at bedtime. 30 tablet 1     Current Facility-Administered Medications   Medication Dose Route Frequency Provider Last Rate Last Admin     cyanocobalamin injection 1,000 mcg  1,000 mcg Intramuscular Q30 Days Loida Stockton MD   1,000 mcg at 05/28/19 0912       Family History: Strong family history of hereditary hemochromatosis    Physical Exam:  General Appearance:   She appears at baseline.  She is neatly groomed.  Vitals:    03/12/21 1243   BP: 124/72   Patient Site: Left Arm   Patient Position: Sitting   Cuff Size: Adult Large   Pulse: 88   Temp: 98  F (36.7  C)   TempSrc: Tympanic   SpO2: 97%   Weight: (!) 224 lb 12.8 oz (102 kg)     Wt Readings from Last 3 Encounters:   03/12/21 (!) 224 lb 12.8 oz (102 kg)   02/02/21 220 lb (99.8 kg)   01/29/21 (!) 224 lb 3 oz (101.7 kg)     Body mass index is 36.28 kg/m .    No exam today    Data Review:    Analysis and Summary of Old Records (2): Reviewed [facility] records/reviewed orthopedic surgery records    Records Requested (1):       Other History Summarized (from other people in the room) (2):     Radiology Tests Summarized (XRAY/CT/MRI/DXA) (1):     Labs Reviewed (1): Ordered and reviewed labs    Medicine Tests Reviewed (EKG/ECHO/COLONOSCOPY/EGD) (1): Reviewed EGD and colonoscopy    Independent Review of EKG or X-RAY (2):

## 2021-06-15 NOTE — PROGRESS NOTES
Clinic Care Coordination Contact    Follow Up Progress Note      Assessment: Spoke with patient today to follow up on her current mental health goal. Patient said she continues to see her therapist Corine Gomez. She stated she additionally works with Dr. Dubois to manage her psychiatric medications. Patient said her mental health continues to be struggle for her at times. She denied any suicidal ideation during today's visit. She said she hopes to return to the [facility] for pool therapy. She said she knows exercise has helped her in the past with her mood. Patient has many questions about her medications including side effects. Discussed meeting with the MTM. She will ask for a referral when she meets with her PCP today.   No other concerns at this time.       Goals addressed this encounter:   Goals Addressed                 This Visit's Progress       Patient Stated      Mental Health Management (pt-stated)        Goal Statement: I would like to have my depression under better control in the next 6 months.   Date Goal set: 12/29/20  Barriers: Long history of depression  Strengths: Strong advocate for herself  Date to Achieve By: 5/29/21  Patient expressed understanding of goal: Yes  Action steps to achieve this goal:  1. I will continue to attend all scheduled appointments with Dr. Dubois and follow up on his recommendations.   2. I will take my medications ordered by Dr. Dubois as prescribed.   3. I will continue to attend all scheduled appointments with my therapist, Corine.  4. I will consider returning to swimming at the [facility] as I know this helps me both physically and mentally.   5. I will continue to participate in activities that I enjoy and keeps me busy at home.   6. I will continue to report progress towards this goals at scheduled outreach telephone calls from the [facility] team.     1/27/2021- Updated and discussed  2/8/21 - Updated and discussed.   3/12/21 - Discussed                   Intervention/Education provided during outreach: Discussed the importance of taking her medications as directed. Encouraged her to attend all scheduled follow up appointments. Discussed COVID-19 precautions.           Plan: CCC RN will continue to monitor, support patient with current needs and will be available to assist as nursing needs arise.     Care Coordinator will follow up in three weeks.
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
## 2021-06-15 NOTE — PROGRESS NOTES
Randi RIVERA CathyAlyce is a 65 y.o. female who is being evaluated via a billable video visit.      How would you like to obtain your AVS? MyChart.  If dropped from the video visit, the video invitation should be resent by: Send to e-mail at: [email or web address]  Will anyone else be joining your video visit? No  Pain score: 8    Pamela Ayers LPN

## 2021-06-15 NOTE — PATIENT INSTRUCTIONS - HE
PLAN:  You are working with the orthopedists to have an EMG for your arm and consider a different injection.    Trileptal to help with the numbness and burning down your arm, 1 to 50 mg daily for 3 days, twice a day for 3 days then 3 times a day.    Discussed you adjusting the way you take medications including the magnesium, lithium orotate.  Continue with Requip 2 mg at bedtime.    Follow-up with Dr. Dubois in 6 to 8 weeks.

## 2021-06-15 NOTE — PROGRESS NOTES
Clinic Care Coordination Contact    Follow Up Progress Note      Assessment: Spoke with patient today to follow up on goals and to discuss any needs or goals. With regards to her mental health, patient said he mood has improved. She was started on Lithium orotate  prescribed to Dr. Dubois. This medications was added to her medication list. She stated she continues to work with her therapist Corine on a weekly basis.   Patient reported she stated taking reacted calcium-magnesium, tumeric, and methyl B-complex. These medications were added to her mediation list. She reported she has stopped taking her iron as she said her iron level was high at her last appointment with her hematologist.   Patient reported she has cataract surgery on Wednesday.   Patient said she has a follow up with [facility] on 2/18/21 for her neck pain. She said is a having an epidural to determine which nerve in her neck is pinched. Patient said she is working with Dr. Munroe at [facility].  Patient reported she found out recently that her [employer] was approved. Goal around [employer] was completed.    Goals addressed this encounter:     Goals        Patient Stated      Mental Health Management (pt-stated)      Goal Statement: I would like to have my depression under better control in the next 6 months.   Date Goal set: 12/29/20  Barriers: Long history of depression  Strengths: Strong advocate for herself  Date to Achieve By: 5/29/21  Patient expressed understanding of goal: Yes  Action steps to achieve this goal:  1. I will continue to attend all scheduled appointments with Dr. Dubois and follow up on his recommendations.   2. I will take my medications ordered by Dr. Dubois as prescribed.   3. I will continue to attend all scheduled appointments with my therapist, Corine.  4. I will consider returning to swimming at the [facility] as I know this helps me both physically and mentally.   5. I will continue to  participate in activities that I enjoy and keeps me busy at home.   6. I will continue to report progress towards this goals at scheduled outreach telephone calls from the CCC team.     1/27/2021- Updated and discussed  2/8/21 - Updated and discussed.                    Intervention/Education provided during outreach: Discussed the importance taking he medications as directed. Encouraged patient to attend all scheduled follow up appointments. Discussed COVID -19 precautions.           Plan: [facility] RN will continue to monitor, support patient with current goal and will be available to assist as nursing needs arise.     Care Coordinator will follow up in one month.

## 2021-06-15 NOTE — TELEPHONE ENCOUNTER
Central PA team  [phone]  Pool: HE PA MED ([id number])          PA has been initiated.       PA form completed and faxed insurance via Cover My Meds     Key:   [id number] - PA Case ID: [id number]     Medication:  Omeprazole 20MG dr capsules    Insurance:  [employer]        Response will be received via fax and may take up to 5-10 business days depending on plan

## 2021-06-16 NOTE — TELEPHONE ENCOUNTER
Telephone Encounter by Reina Kimball CMA at 6/26/2019  1:25 PM     Author: Reina Kimball CMA Service: -- Author Type: Certified Medical Assistant    Filed: 6/26/2019  1:26 PM Encounter Date: 6/26/2019 Status: Signed    : Reina Kimball CMA (Certified Medical Assistant)       Patients July script has been cancelled. A new 28 day supply has been cued for approval.    Patient had reaction to Fentanyl is just taking Hydrocodone. Patient was going to call back and let us know how she is doing    Last OV 06/06/2019  Next OV 07/23/2019

## 2021-06-16 NOTE — TELEPHONE ENCOUNTER
Telephone Encounter by Consuelo Little CNP at 10/3/2019  4:25 PM     Author: Consuelo Little CNP Service: -- Author Type: Nurse Practitioner    Filed: 10/3/2019  4:55 PM Encounter Date: 10/2/2019 Status: Signed    : Consuelo Little CNP (Nurse Practitioner)       Call placed to the pt. She is in recovery.     She shared that she got caught b/t a couple of doors with her rt shoulder. She has been feeling better from an injection. Surgery has been under consideration. She indicates she is concerned she will not be able to proceed at this time due to her partner Sidney's health.     Sidney has a renal tumor. She indicates there is concern this may be cancer. She indicates they will be seen tomorrow and MRI to determine if a biopsy can be performed is being completed. She is not certain about any surgeries for Sidney.     She cxld her appt on Friday [facility]. She needs a refill. She is hoping to remain at current dosing . I think this is reasonable until her visit with Dr. Dubois. Pt was taken to Case Review with a focus on tapering her off of the opioid medication due to a recent alcohol relapse. She lives with bipolar disorder it is felt she would benefit from a psychiatry evaluation and review of her pain care.    Medication being requested: hydrocodone  Last visit date: 9/12/19.  Provider: gregory  Next visit date: 10/31/19.  Provider: gregory  10/15/19 - BE  10/10/19 - Mabel - No show 9/30 2nd No show  Expected follow up: 2 weeks  MTM visit (Pain Center) date: n/a  UDT 07/23/2019  Agreement 06/06/2019   snipped in:    Pertinent between visit information about requested medication (telephone, mychart, prior authorization, concerns, comments):reviewed Case Review notes 8/26/19  Script being sent to provider by nurse- dates and quantity:   Requested Prescriptions     Signed Prescriptions Disp Refills   ? HYDROcodone-acetaminophen 5-325 mg per tablet 28 tablet 0     Sig: Take 1 tablet by mouth 2 (two) times a day as  needed for pain.     Authorizing Provider: HUMAIRA HANSEN     [facility]  Standing orders for withdrawal protocol implemented: no

## 2021-06-16 NOTE — PROGRESS NOTES
CHW reviewed CC RN note from 3/12/2021, CC goal was discussed at this outreach.  Next CHW outreach: 4/9/2021    Goals        Patient Stated      Mental Health Management (pt-stated)      Goal Statement: I would like to have my depression under better control in the next 6 months.   Date Goal set: 12/29/20  Barriers: Long history of depression  Strengths: Strong advocate for herself  Date to Achieve By: 5/29/21  Patient expressed understanding of goal: Yes  Action steps to achieve this goal:  1. I will continue to attend all scheduled appointments with Dr. Dubois and follow up on his recommendations.   2. I will take my medications ordered by Dr. Dubois as prescribed.   3. I will continue to attend all scheduled appointments with my therapist, Corine.  4. I will consider returning to swimming at the [employer] as I know this helps me both physically and mentally.   5. I will continue to participate in activities that I enjoy and keeps me busy at home.   6. I will continue to report progress towards this goals at scheduled outreach telephone calls from the [facility] team.     1/27/2021- Updated and discussed  2/8/21 - Updated and discussed.   3/12/21 - Discussed

## 2021-06-16 NOTE — TELEPHONE ENCOUNTER
Telephone Encounter by Karissa Cavazos RN at 1/6/2020  3:14 PM     Author: Karissa Cavazos RN Service: -- Author Type: Registered Nurse    Filed: 1/6/2020  3:28 PM Encounter Date: 1/6/2020 Status: Signed    : Karissa Cavazos RN (Registered Nurse)       Patient left a message stating that she needs a new Rx for Butrans patches sent over to [facility] in [place].  [facility] because [facility] $200.00[facility] and since the Rx is non transferable she needs  Anew one sent over.        Cued up Butrans patch for [facility] in [place].    RN called [facility] to cancel the rest of the Rx. Pharmacist confirmed Rx cancelled.    Requested Prescriptions     Pending Prescriptions Disp Refills   ? buprenorphine (BUTRANS) 10 mcg/hour PTWK patch 4 patch 0     Sig: Place 1 patch on the skin every 7 days.

## 2021-06-16 NOTE — TELEPHONE ENCOUNTER
Telephone Encounter by Consuelo Little CNP at 2/1/2019  3:27 PM     Author: Consuelo Little CNP Service: -- Author Type: Nurse Practitioner    Filed: 2/1/2019  3:36 PM Encounter Date: 2/1/2019 Status: Signed    : Consuelo Little CNP (Nurse Practitioner)       Medication being requested: oxycodone  Next appointment 2/15  Last appointment 11/27  Expected follow up: 2 months  MTM visit (Pain Center) date: not since visit 11/27/18  UDT date: 5/14/18  Agreement date: 5/14/18   snipped in:  Per RN  Red Flags/Comments identified on call: Missed appointment on 1/18- phone note reviewed r/t transportation issues  Pertinent between visit information about requested medication (telephone, mychart, prior authorization):     Script being sent to provider by nurse- dates and quantity: 2/1-2/16  Requested Prescriptions     Pending Prescriptions Disp Refills   ? oxyCODONE (ROXICODONE) 5 MG immediate release tablet 90 tablet 0     Sig: Take 1 tablet (5 mg total) by mouth every 4 (four) hours as needed for pain.       [facility]  Standing orders for withdrawal protocol implemented: n/a    Plan:  I lack clarity on the need for medication it appears she filled on 12/26/18 per the  #[id number] Sidney and our records indicate a script written 1/30-2/27 - Silvino - does the pt have something on file at the Pharmacy?

## 2021-06-16 NOTE — TELEPHONE ENCOUNTER
Telephone Encounter by Erin Blake at 2/15/2019 12:02 PM     Author: Erin Blake Service: -- Author Type: --    Filed: 2/15/2019 12:03 PM Encounter Date: 2/15/2019 Status: Signed    : Erin Blake       [facility] team  [phone]    PA has been initiated.

## 2021-06-16 NOTE — PROGRESS NOTES
Clinic Care Coordination Contact    Situation: Patient chart reviewed by care coordinator.    Background: CCC team continues to support patient with a goal around getting her depression under better control.     Assessment: [facility] staff continue to reach out to patient on a monthly basis to discuss progression of her goal. During recent phone conversation with [facility] CHW on 4/13/21, patient her dark times seem to be less and better times are increasing. She stated she is exercising more and has started meditation. She said she keeps herself busy, which she said keeps her mind in a good healthy place.    Plan/Recommendations: CCC RN will continue to monitor, support patient with current goal and will be available to assist as nursing needs arise.     CCC RN will perform Chart Review in 45 days.

## 2021-06-16 NOTE — TELEPHONE ENCOUNTER
Telephone Encounter by Una Ordaz LPN at 8/6/2019  3:42 PM     Author: Una Ordaz LPN Service: -- Author Type: Certified Medical Assistant    Filed: 8/6/2019  3:44 PM Encounter Date: 8/2/2019 Status: Signed    : Una Ordaz LPN (Licensed Nurse)       Patient Returning Call  Reason for call:  Patient returning call  Information relayed to patient:  Cinda Lechuga CMA   to Loida Stockton MD            10:07 AM   rx pended   Loida Stockton MD   to Cinda Lechuga CMA         9:55 AM   Note      Ok to write as requested      Patient has additional questions:  Yes  If YES, what are your questions/concerns:  Patient requested a call from clinic about medication levothyroxine.   Okay to leave a detailed message?: Yes

## 2021-06-16 NOTE — TELEPHONE ENCOUNTER
Telephone Encounter by Milagro Bailey RN at 9/19/2019  1:55 PM     Author: Milagro Bailey RN Service: -- Author Type: Registered Nurse    Filed: 9/19/2019  4:24 PM Encounter Date: 9/19/2019 Status: Addendum    : Milagro Bailey RN (Registered Nurse)    Related Notes: Original Note by Milagro Bailey RN (Registered Nurse) filed at 9/19/2019  4:20 PM       Patient calls, asking why only able to get a week supply of hydrocodone 5/325 mg    Last visit date: 9/12  Provider: FAROOQ  Next visit date: 10/04.  Provider: FAROOQ  Expected follow up: 8 weeks  MTM visit (Pain Center) date: no  UDT 07/23/2019  Agreement 06/06/2019   snipped in:    Pertinent between visit information about requested medication (telephone, mychart, prior authorization, concerns, comments):   Script being sent to provider by nurse- dates and quantity:   Requested Prescriptions     Pending Prescriptions Disp Refills   ? HYDROcodone-acetaminophen 5-325 mg per tablet 21 tablet 0     Sig: Take 1 tablet by mouth 3 (three) times a day as needed for pain.     [facility]  Standing orders for withdrawal protocol implemented: SONIYA

## 2021-06-16 NOTE — TELEPHONE ENCOUNTER
Telephone Encounter by Casi Durbin at 8/6/2019 12:13 PM     Author: Casi Durbin Service: -- Author Type: --    Filed: 8/6/2019 12:15 PM Encounter Date: 7/31/2019 Status: Signed    : Casi Durbin       PRIOR AUTHORIZATION DENIED    Denial Rational: All minerals and vitamins are excluded from Part D coverage              Appeal Information: If provider would like to appeal please provide a letter of medical necessity and route back to the PA team.
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
## 2021-06-16 NOTE — PATIENT INSTRUCTIONS - HE
PLAN:  Discussed you will be stopping the omeprazole, continuing the digestive enzymes, and assess how some your symptoms respond.    You may adjust the lithium orotate from daily to every third day targeting mood symptoms.    Your workman orthopedics around neck and arm pain.    Continue the Requip for your restless legs and magnesium supplementation.    Continue your exercise program with riding a stationary bike and getting to the pool.    Follow-up with Dr. Dubois in 8 weeks.

## 2021-06-16 NOTE — TELEPHONE ENCOUNTER
Telephone Encounter by Tiana Li RN at 3/12/2020 10:30 AM     Author: Tiana Li RN Service: -- Author Type: Registered Nurse    Filed: 3/12/2020 10:42 AM Encounter Date: 3/12/2020 Status: Signed    : Tiana Li RN (Registered Nurse)       Medication being requested: Butrans patch  Last visit date: 2/11.  Provider: jammie  Next visit date: 4/23.  Provider: jammie  Expected follow up: 8 weeks  MTM visit (Pain Center) date: no  UDT date: 7/2019  Agreement date: 6/2019   snipped in:  Pertinent between visit information about requested medication (telephone, mychart, prior authorization, concerns, comments): appts with primary   Script being sent to provider by nurse- dates and quantity:   Requested Prescriptions     Pending Prescriptions Disp Refills   ? buprenorphine (BUTRANS) 10 mcg/hour PTWK patch 4 patch 0     Sig: Place 1 patch on the skin every 7 days.     Pharmacy cued: [facility]  Standing orders for withdrawal protocol implemented: na

## 2021-06-16 NOTE — TELEPHONE ENCOUNTER
"Telephone Encounter by Tiana Li, RN at 3/11/2019  9:21 AM     Author: Tiana Li, RN Service: -- Author Type: Registered Nurse    Filed: 3/11/2019  9:35 AM Encounter Date: 3/8/2019 Status: Signed    : Tiana Li RN (Registered Nurse)       Medication being requested: Fentanyl patch  Last visit date: 2/15/19  Provider: FAROOQ  Next visit date: 3/19.  Provider: FAROOQ  Expected follow up: One month  MTM visit (Pain Center) date: NA  UDT date: 4/14/18  Agreement date: 6/15/18   snipped in:    Red Flags/Comments identified on call: no  Pertinent between visit information about requested medication (telephone, mychart, prior authorization): trouble with using patches. \"Thinks has the hang of it now\"  Script being sent to provider by nurse- dates and quantity:   Requested Prescriptions     Pending Prescriptions Disp Refills   ? fentaNYL (DURAGESIC) 12 mcg/hr 10 patch 0     Sig: Place 1 patch on the skin every third day.     Pharmacy cued: [facility].  Standing orders for withdrawal protocol implemented: na       "

## 2021-06-16 NOTE — PROGRESS NOTES
Randi RIVERA CathyAlyce is a 65 y.o. female who is being evaluated via a billable video visit.      How would you like to obtain your AVS? MyChart.  If dropped from the video visit, the video invitation should be resent by: Send to e-mail at: [email or web address]  Will anyone else be joining your video visit? No  Pain score: 6    Pamela Ayers LPN

## 2021-06-16 NOTE — TELEPHONE ENCOUNTER
Telephone Encounter by Erin Blake at 11/21/2019  9:13 AM     Author: Erin Blake Service: -- Author Type: --    Filed: 11/21/2019  9:15 AM Encounter Date: 11/19/2019 Status: Addendum    : Erin Blake    Related Notes: Original Note by Eirn Blake filed at 11/21/2019  9:13 AM       PA APPROVED:    Approval start date:8/22/19  Approval end date:11/19/20    Pharmacy has been notified of approval and will contact patient when medication is ready for pickup.

## 2021-06-16 NOTE — TELEPHONE ENCOUNTER
Telephone Encounter by Erin Blake at 2/15/2019  5:05 PM     Author: Erin Blake Service: -- Author Type: --    Filed: 2/15/2019  5:05 PM Encounter Date: 2/15/2019 Status: Signed    : Erin Blake APPROVED:    Approval start date: 11/17/2018  Approval end date: 2/15/2020    Pharmacy has been notified of approval and will contact patient when medication is ready for pickup.

## 2021-06-16 NOTE — TELEPHONE ENCOUNTER
Telephone Encounter by Milagro Bailey RN at 2/14/2020 10:06 AM     Author: Milagro Bailey RN Service: -- Author Type: Registered Nurse    Filed: 2/14/2020 10:15 AM Encounter Date: 2/14/2020 Status: Signed    : Milagro Bailey RN (Registered Nurse)       Medication being requested: butrans 10 mcg patch  Last visit date: 2/11  Provider: BE  Next visit date: no follow up set up  Provider: BE  Expected follow up: 8 weeks  MTM visit (Pain Center) date: not since office visit  UDT 07/23/2019  CSA 06/06/2019   snipped in:    Pertinent between visit information about requested medication (telephone, mychart, prior authorization, concerns, comments):   Patient will need to reschedule before refills sent  Script being sent to provider by nurse- dates and quantity:   Requested Prescriptions     Pending Prescriptions Disp Refills   ? buprenorphine (BUTRANS) 10 mcg/hour PTWK patch 4 patch 0     Sig: Place 1 patch on the skin every 7 days.     Pharmacy cued: [facility]  Standing orders for withdrawal protocol implemented: NA

## 2021-06-16 NOTE — TELEPHONE ENCOUNTER
Telephone Encounter by Casi Durbin at 8/16/2019  1:22 PM     Author: Casi Durbin Service: -- Author Type: --    Filed: 8/16/2019  1:24 PM Encounter Date: 8/16/2019 Status: Signed    : Casi Durbin APPROVED:    Approval start date:05/18/2019  Approval end date:08/15/2020    Pharmacy has been notified of approval and will contact patient when medication is ready for pickup.

## 2021-06-16 NOTE — PROGRESS NOTES
[facility] Follow Up Note    Assessment/Plan:  1. Hemochromatosis-by history  No genetic testing to confirm however 2 siblings with hereditary hemochromatosis.  Of note, she was diagnosed shortly after chemotherapy for her breast cancer because of a significantly elevated ferritin.  History includes phlebotomy in the past.  Things have been quiesced it.  More recently, her hemoglobin is up to 16.3.  Recommendation: We will be rechecking labs today to look at transferrin saturation and patient would like to proceed with hemochromatosis gene analysis.  - HM2(CBC w/o Differential)  - Iron and Transferrin Iron Binding Capacity  - Thyroid Stimulating Hormone (TSH)  - Hemochromatosis HFE Gene Analysis, Blood ($$$)    2. Obesity (BMI 35.0-39.9 without comorbidity)  Patient would like to turn over a new leaf with regard to diet.  She continues to drink 3 or 4 soda pops per day and eat candy while buying organic food and [place].  Recommendation: Have encouraged her to eat more cleanly.  She is to discontinue pop and candy.  If she needs extra assistance, she can seek consultation with the bariatric dietitian  - Amb Referral to Bariatric Dietician    3. Postablative hypothyroidism  Last TSH was borderline.  Will recheck today      Follow-up 6-8 weeks    Loida Stockton MD    Chief Complaint:  Chief Complaint   Patient presents with     Follow-up       History of Present Illness:  Randi is a 62 y.o. female who is here today for follow-up.  Of note, with last set of labs, her hemoglobin was up to 16.3.  She carries a history of hemochromatosis.  There is a family history of a brother and a sister who have been diagnosed with hemochromatosis.  Additionally, her father  in his early 50s of complications of heart disease.  This was thought to be perhaps exacerbated by hemochromatosis.  Of note, she had previously been diagnosed with this by Dr. Steel with hematology.  This was greater than 20 years  ago when she had a significantly elevated ferritin after chemotherapy.  She has never had genetic testing.  Of note, in addition to elevated ferritin in the past, she has had hypothyroidism, pheochromocytoma, alcoholic liver disease-remotely.    Today, she spends a great deal of time talking about how she wants to live more healthfully.  She believes her psoriatic arthritis and psoriasis could be improved by eating better.  She thinks her 's diabetes and prostate problems could be improved as well.  She states that he is very well.  However, she describes drinking 3-4 soda pops per day and eating candy.  She has reduced her consumption of bread which was significant.    With regard to depression, she continues to work with psychiatry.  She believes that her depression is contributed to by thyroid problems and a ferritin under 50, according to her recent reading.    Review of Systems:  A comprehensive review of systems was performed and was otherwise negative    PFSH:  Social History: She is .  She has been  for 13 years but has lived with her  for 44 years  History   Smoking Status     Former Smoker     Quit date: 8/1/2003   Smokeless Tobacco     Never Used       Past History:   Current Outpatient Prescriptions   Medication Sig Dispense Refill     albuterol (PROAIR HFA;PROVENTIL HFA;VENTOLIN HFA) 90 mcg/actuation inhaler Inhale 2 puffs every 6 (six) hours as needed for wheezing.       albuterol (PROVENTIL) 2.5 mg /3 mL (0.083 %) nebulizer solution Take 3 mL (2.5 mg total) by nebulization every 6 (six) hours as needed for wheezing. 75 mL 12     alclomethasone (ACLOVATE) 0.05 % cream        betamethasone valerate (VALISONE) 0.1 % ointment Apply hs 45 g 1     clobetasol (TEMOVATE) 0.05 % cream        cyanocobalamin, vitamin B-12, 2,500 mcg Subl Place under the tongue 2 (two) times a week.       diclofenac sodium (VOLTAREN) 1 % Gel Apply 1 application topically 2 (two) times a day as needed  (pain on shoulder and ankle). 100 g 12     DULoxetine (CYMBALTA) 30 MG capsule Take 1 capsule (30 mg total) by mouth 2 (two) times a day. 60 capsule 1     ergocalciferol (VITAMIN D2) 50,000 unit capsule Take 50,000 Units by mouth 2 (two) times a week.       folic acid (FOLVITE) 1 MG tablet TAKE 1 TABLET (1 MG) BY ORAL ROUTE ONCE DAILY 90 tablet 3     HYDROcodone-acetaminophen (NORCO )  mg per tablet Take 1 tablet by mouth every 6 (six) hours as needed for pain. 112 tablet 0     levothyroxine (SYNTHROID, LEVOTHROID) 150 MCG tablet Take 1 tablet (150 mcg total) by mouth every other day. Alternate with 175mcg tablets 15 tablet 12     levothyroxine (SYNTHROID, LEVOTHROID) 175 MCG tablet Take 1 tablet (175 mcg total) by mouth every other day. Alternate with 150mcg tablets 15 tablet 12     LORazepam (ATIVAN) 1 MG tablet Take 1 mg by mouth 2 (two) times a day as needed.   0     omeprazole (PRILOSEC) 20 MG capsule TAKE 1 CAPSULE BY MOUTH 2 TIMES A  capsule 3     polyethylene glycol (MIRALAX) 17 gram packet Take 1 packet (17 g total) by mouth daily. 30 packet 5     pramipexole (MIRAPEX) 1 MG tablet Take 0.5-1 tablets (0.5-1 mg total) by mouth 2 (two) times a day. 60 tablet 5     temazepam (RESTORIL) 7.5 MG capsule        morphine (MS CONTIN) 15 MG 12 hr tablet Take 1 tablet (15 mg total) by mouth at bedtime as needed for pain. 30 tablet 0     No current facility-administered medications for this visit.        Family History: Significant for one brother and one sister with hereditary hemochromatosis.  Her father  of premature coronary disease or some sort of premature cardiac event in his 50s    Physical Exam:  General Appearance:   She appears at baseline  Vitals:    18 1338   BP: 136/82   Patient Site: Left Arm   Patient Position: Sitting   Cuff Size: Adult Large   Pulse: (!) 108   Weight: (!) 241 lb 8 oz (109.5 kg)     Wt Readings from Last 3 Encounters:   18 (!) 241 lb 8 oz (109.5 kg)    01/30/18 (!) 237 lb (107.5 kg)   12/06/17 (!) 226 lb (102.5 kg)     Body mass index is 38.98 kg/(m^2).    Time spent today in excess of 25 minutes with more than half that time spent in counseling and discussion of the diagnosis of hemochromatosis

## 2021-06-16 NOTE — PROGRESS NOTES
Discharge Summary      Dates of service 10/13/2016 to 3/12/2018     Diagnosis at Intake  Major Depressive Disorder, recurrent, severe  Generalized Anxiety Disorder  R/O PTSD    Diagnosis at Discharge  1. Bipolar affective disorder, currently depressed, moderate    2. Generalized anxiety disorder    3. Chronic post-traumatic stress disorder (PTSD)          Progress toward goal 1: Decrease average depression level from 22 to 10 or below.  Partially Met    Progress toward goal 2:              Decrease average anxiety level from 12 to 6 or below  Partially Met    Additional goals: Decrease average pain level from 8/10  to 5/10.  Partially Met    Additional comments: Pt has had inconsistent attendance over the past 2 years with multiple late cancels and long periods of time without follow up appointments.  Due to it being 3 months since last session, patient's file will be closed. She was mailed a discharge warning letter on 2/26/18 with no contact from patient.     Reason for discharge:Pt dropped out     Prognosis at discharge:Poor    Recommendations and referrals at discharge: Pt should follow up with psychiatrist, pain center provider and attend psychotherapy regularly.          Shanice Merida      3/12/2018  9:45 AM

## 2021-06-16 NOTE — TELEPHONE ENCOUNTER
"Telephone Encounter by Milagro Bailey RN at 11/4/2019 12:18 PM     Author: Milagro Bailey RN Service: -- Author Type: Registered Nurse    Filed: 11/4/2019 12:37 PM Encounter Date: 11/4/2019 Status: Signed    : Milagro Bailey RN (Registered Nurse)       Patient calls, voicemail regarding suboxone.  Patient reports this has not been \"working out\"  She reports feeling \"high\" \"I started this on Saturday, feeling confused and not talking well\"  Patient does not intend to take this again.  She would like to know the next steps as she is now not taking anything for pain.        Next office visit: 12/04 with BE    Call placed to patient:  Patient reports that she did not like how the suboxone made her feel dizzy, labored breathing, anxiety and upset stomach.  Patient reports having started this medication on Saturday, 11/2 then stopping it on Jacoby 11/3.  Recently started on lasix and has been needing to go to the bathroom frequently, reports side effects of dizzyness and confusion this has been quite scary with having to ambulate to the restroom so often.  Patient is scared to take this medication again and reports wanting to go back to hydrocodone 10/325 mg, \"I was taking two per day and this is not going to kill me\"  Patient reports having changed medications due to respiratory complications but at this time does not want to be trying \"all kinds of new meds\"  She would also like to know when she should follow up with [facility].    Please review documentation from phone call/offer recommendations and route back/nurse can contact patient with recommendations.           "

## 2021-06-16 NOTE — PROGRESS NOTES
Assessment/Plan:     Problem List Items Addressed This Visit     Restless Legs Syndrome    Relevant Medications    rOPINIRole (REQUIP) 2 MG tablet              No follow-ups on file.    Patient Instructions   PLAN:  Discussed you will be stopping the omeprazole, continuing the digestive enzymes, and assess how some your symptoms respond.    You may adjust the lithium orotate from daily to every third day targeting mood symptoms.    Your workman orthopedics around neck and arm pain.    Continue the Requip for your restless legs and magnesium supplementation.    Continue your exercise program with riding a stationary bike and getting to the pool.    Follow-up with Dr. Dubois in 8 weeks.        Subjective:       65 y.o. female Randi Damon is a 65 y.o. female who is being evaluated via a billable video visit.      How would you like to obtain your AVS? MyChart.  If dropped from the video visit, the video invitation should be resent by:   Will anyone else be joining your video visit?       Video Start Time:         Subjective   Randi Damon is 65 y.o. and presents today for the following health issues   HPI patient followed here for chronic pain related to cervical stenosis, and more recently focusing on mood disorder.    Reviews today has been having trouble with restless legs, and talking to the pharmacist at [facility].  They reviewed that her long-term use of omeprazole may have affected some of her absorption of nutrients possibly iron.  She has been put on some digestive enzymes and plans to stop the omeprazole for a trial.    She has been using the lithium orotate initially at nighttime C disrupt sleep, also tried increasing to 2 daily and had more anxiety.  She has now been taking it every other day or every third day to observe how she is doing.  In general her mood appears to be doing better and her restless legs are better.    She continues with Requip and magnesium  supplementation.    She added back some iron supplementation which also seems to help the restless legs.    When last seen we discussed Trileptal for cervical radiculopathy she has decided to hold on that as she also started methocarbamol for spasms and was concerned about side effects she read about.    She has discussed having some yellow stools, possibly correlating to the digestive enzymes.  Reviewed also could be from the 2 Semaj.  She has been using the 2 Semaj as a anti-inflammatory as she has not taken other pain medicines.    She has been working at the orthopedics, seen her previous surgeon.  They have discussed whether she may need shoulder surgery, and possible carpal tunnel surgery.  They are talking about the timing and recovery involved.    She still has some numbness and tingling in her hands.    She is been riding a stationary bike in the house, and plans to get going in the pool looking forward to that.    She continues working with her psychotherapist.  Those notes are reviewed describing some dynamics.    She reports in general with better sleep and improvement of restless legs her mood is doing better.  Graph she did have colonoscopy and endoscopy, no evidence for bleeding.      Current Outpatient Medications:      albuterol (PROAIR HFA;PROVENTIL HFA;VENTOLIN HFA) 90 mcg/actuation inhaler, Inhale 2 puffs every 4 (four) hours as needed., Disp: 8.5 g, Rfl: 12     albuterol (PROVENTIL) 2.5 mg /3 mL (0.083 %) nebulizer solution, Take 3 mL (2.5 mg total) by nebulization every 6 (six) hours as needed for wheezing., Disp: 75 mL, Rfl: 12     aspirin 81 MG EC tablet, Take 81 mg by mouth daily., Disp: , Rfl:      cholecalciferol, vitamin D3, (VITAMIN D3) 5,000 unit Tab, Take 10,000 Units by mouth daily., Disp: , Rfl:      cyanocobalamin, vitamin B-12, 5,000 mcg/mL Drop, Take 5,000 mcg by mouth daily. , Disp: , Rfl:      fluticasone-umeclidinium-vilanterol (TRELEGY ELLIPTA) 100-62.5-25 mcg DsDv  inhaler, Inhale 1 Inhalation daily., Disp: 3 each, Rfl: 3     furosemide (LASIX) 20 MG tablet, Take 1 tablet (20 mg total) by mouth daily., Disp: 180 tablet, Rfl: 3     ketorolac (ACULAR) 0.5 % ophthalmic solution, Place 1 drop into left eye three times a day as directed.  Start 3 days before surgery, Disp: , Rfl:      KLOR-CON M20 20 mEq tablet, Take 1 tablet (20 mEq total) by mouth 2 (two) times a day., Disp: 180 tablet, Rfl: 3     levothyroxine (SYNTHROID, LEVOTHROID) 150 MCG tablet, Take 1 tablet (150 mcg total) by mouth daily. TAY SYNTHROID, Disp: 90 tablet, Rfl: 2     losartan (COZAAR) 25 MG tablet, Take 1 tablet (25 mg total) by mouth daily., Disp: 90 tablet, Rfl: 3     methocarbamoL (ROBAXIN) 500 MG tablet, , Disp: , Rfl:      ofloxacin (OCUFLOX) 0.3 % ophthalmic solution, Instill 1 drop into left eye three times a day as directed. Start 3 days before surgery, Disp: , Rfl:      omeprazole (PRILOSEC) 20 MG capsule, TAKE 1 CAPSULE BY MOUTH 2 TIMES A DAY, Disp: 180 capsule, Rfl: 2     prednisoLONE acetate (PRED-FORTE) 1 % ophthalmic suspension, place 1 drop into the left eye three times a day as directed. start 3 days before surgery., Disp:  , Rfl: 0     rOPINIRole (REQUIP) 2 MG tablet, Take 1 tablet (2 mg total) by mouth at bedtime., Disp: 30 tablet, Rfl: 1     UNABLE TO FIND, Take 10 mg by mouth daily. Med Name: Lithium orotate, Disp: , Rfl:      UNABLE TO FIND, Take 6 tablets by mouth daily. Med Name: reacted behzad-mag supplement - 200 mg calcium, 40 phosphorus, 175 magnesium, Disp: , Rfl:      UNABLE TO FIND, Take 500 mg by mouth daily. Med Name: Tumeric, Disp: , Rfl:      UNABLE TO FIND, Take 1 tablet by mouth daily. Med Name: methyl-B complex, Disp: , Rfl:     Current Facility-Administered Medications:      cyanocobalamin injection 1,000 mcg, 1,000 mcg, Intramuscular, Q30 Days, Loida Stockton MD, 1,000 mcg at 05/28/19 0912    Video she is alert with a clear sensorium.  Thought process logical.  She  smiles and laughs frequently.  Much wider range of affect.  No respiratory distress.      Additional provider notes:     Review of Systems        Objective         Physical Exam              Video-Visit Details    Type of service:  Video Visit    Video End Time (time video stopped):   Originating Location (pt. Location): Home    Distant Location (provider location):  [facility]     Platform used for Video Visit: [employer]           Objective:   There were no vitals filed for this visit.      Plan as above,    Total time more than 35 minutes.          This note has been dictated using voice recognition software. Any grammatical or context distortions are unintentional and inherent to the software

## 2021-06-16 NOTE — PROGRESS NOTES
"Clinic Care Coordination Contact    Community Health Worker Follow Up    Goals:   Goals Addressed                 This Visit's Progress       Patient Stated      Mental Health Management (pt-stated)   40%     Goal Statement: I would like to have my depression under better control in the next 6 months.   Date Goal set: 12/29/20  Barriers: Long history of depression  Strengths: Strong advocate for herself  Date to Achieve By: 5/29/21  Patient expressed understanding of goal: Yes  Action steps to achieve this goal:  1. I will continue to attend all scheduled appointments with Dr. Dubois and follow up on his recommendations.   2. I will take my medications ordered by Dr. Dubois as prescribed.   3. I will continue to attend all scheduled appointments with my therapist, Corine.  4. I will consider returning to swimming at the [facility] as I know this helps me both physically and mentally. No as of yet 4/13  5. I will continue to participate in activities that I enjoy and keeps me busy at home.   6. I will continue to report progress towards this goals at scheduled outreach telephone calls from the [facility] team.     1/27/2021- Updated and discussed  2/8/21 - Updated and discussed.   3/12/21 - Discussed   4/13= discussed                CHW Next Follow-up: 5/10/2021    CHW Plan: Continue with monthly outreach calls to check in with Winnie and to update goal progression.    CHW spoke to patient today, patient states she is doing \"so so\"  No questions or concerns today.  Patient states she is doing well without medication and the dark time seem to be less and the better times are increasing. Patient states she is exercising more and has started meditation.  She reminded the CHW that bad times are still there but seem to be less as she stays busy and keeps her mind in a good healthy place.  "

## 2021-06-17 ENCOUNTER — VIRTUAL VISIT (OUTPATIENT)
Dept: PSYCHOLOGY | Facility: CLINIC | Age: 66
End: 2021-06-17
Payer: MEDICARE

## 2021-06-17 DIAGNOSIS — F41.1 GAD (GENERALIZED ANXIETY DISORDER): ICD-10-CM

## 2021-06-17 DIAGNOSIS — F33.1 MAJOR DEPRESSIVE DISORDER, RECURRENT EPISODE, MODERATE WITH ANXIOUS DISTRESS (H): Primary | ICD-10-CM

## 2021-06-17 PROCEDURE — 90834 PSYTX W PT 45 MINUTES: CPT | Mod: 95 | Performed by: SOCIAL WORKER

## 2021-06-17 NOTE — PATIENT INSTRUCTIONS - HE
PLAN:   You will discuss with your primary care provider other resource to evaluate your concern about restless legs and sleep problems such as sleep medicine or neurology.    Continue good work returning to the pool at [facility].    Continue with the magnesium supplementation.    You will continue to work with orthopedics having your left carpal tunnel syndrome scheduled next month.    Call Dr. Dubois with questions follow-up in 6 to 8 weeks.
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
## 2021-06-17 NOTE — TELEPHONE ENCOUNTER
"Telephone Encounter by Karissa Cavazos, RN at 7/21/2020 12:00 PM     Author: Karissa Cavazos RN Service: -- Author Type: Registered Nurse    Filed: 7/21/2020 12:05 PM Encounter Date: 7/21/2020 Status: Signed    : Karissa Cavazos RN (Registered Nurse)       Patient left a message requesting a refill of Butrans patches because \"she is in a special situation and is on borrowed time\"  Patient stated she is supposed to be switching over to Belbuca, but this has not happened yet.    Last AVS:      PA approved for Belbuca      RN deferring to provider on which medication to order.    Last visit date: 6/18/20  Provider: BE  Next visit date: Not Scheduled  Provider: BE  Expected follow up: 8 weeks  MTM visit ([facility]) date: No  UDT date: 7/2019  Agreement date: 6/2019   snipped in:    Pertinent between visit information about requested medication (telephone, mychart, prior authorization, concerns, comments):   PA approved for Belbuca  Script being sent to provider by nurse- dates and quantity: defer to provider  Pharmacy cued: [facility] be [facility] [place]  Standing orders for withdrawal protocol implemented: NA         "

## 2021-06-17 NOTE — PROGRESS NOTES
Randi RIVERA CathyAlyce is a 65 y.o. female who is being evaluated via a billable video visit.      How would you like to obtain your AVS? MyChart.  If dropped from the video visit, the video invitation should be resent by: Send to e-mail at: [email or web address]  Will anyone else be joining your video visit? No  Patient has a follow up appointment with Dr. Dubois.   Pain Score: 8  I  Pamela Ayers LPN

## 2021-06-17 NOTE — TELEPHONE ENCOUNTER
Telephone Encounter by Gricel Catherine RN at 6/15/2020  8:26 AM     Author: Gricel Catherine RN Service: -- Author Type: Registered Nurse    Filed: 6/15/2020  8:47 AM Encounter Date: 6/13/2020 Status: Signed    : Gricel Catherine RN (Registered Nurse)       Medication being requested: Butrans  Last visit date: 4/23   Provider: BE  Next visit date:  6/18  Provider: BE  Expected follow up: 6- 8 weeks  MTM visit ([facility]) date: na  UDT date: 7/23/19  Agreement date: 6/6/19   snipped in:        Pertinent between visit information about requested medication (telephone, mychart, prior authorization, concerns, comments): Continue with the buprenorphine 10 mcg patch weekly.  Script being sent to provider by nurse- dates and quantity:   Requested Prescriptions     Pending Prescriptions Disp Refills   ? buprenorphine (BUTRANS) 10 mcg/hour PTWK patch [Pharmacy Med Name: Buprenorphine Transdermal Patch Weekly 10 MCG/HR] 4 patch 0     Sig: Place 1 patch on the skin every 7 days.     [facility] cued: [facility]  Standing orders for withdrawal protocol implemented: na

## 2021-06-17 NOTE — PROGRESS NOTES
Clinic Care Coordination Contact  [facility]/Voicemail       Clinical Data: Care Coordinator Outreach  Outreach attempted x 1.  Left message on patient's voicemail with call back information and requested return call.  Plan: Care Coordinator update goal progression    Care Coordinator will try to reach patient again in 10 business days.

## 2021-06-17 NOTE — TELEPHONE ENCOUNTER
New Appointment Needed  What is the reason for the visit:    Pre-Op Appt Request  When is the surgery? :  6/9/21  Where is the surgery?:[facility] orth[facility]  Who is the surgeon? :Miriam  What type of surgery is being done?:right ankle  Provider Preference: PCP only  How soon do you need to be seen?: next week  Waitlist offered?: Yes  Okay to leave a detailed message:  Yes

## 2021-06-17 NOTE — TELEPHONE ENCOUNTER
Telephone Encounter by Una Ordaz LPN at 9/1/2020  2:59 PM     Author: Una Ordaz LPN Service: -- Author Type: Licensed Nurse    Filed: 9/1/2020  3:00 PM Encounter Date: 8/27/2020 Status: Signed    : Una Ordaz LPN (Licensed Nurse)       Patient Returning Call  Reason for call:  Patient returning call   Information relayed to patient:  Jenn Wheat LPN           8/27/20 5:05 PM   Note      Spoke with pt who states she was not happy with her virtual visit with Dr. Stroud today. Pt reports doctor didn't really listen to her, stated her problems weren't addressed.     Reviewed visit with pt-pt reiterated problems as discussed in visit, Anxiety, insomnia, Restless body (pt reports her entire body is shaking).       Pt has had recent [facility] Dr. Dubois [facility]      Pt believes these symptoms she's experiencing are from her tapering of Pristiq, as instructed by Dr. Dubois, see multiple nurse triage messages over the past several days.      Spoke with pt about trouble breathing, as message below indicates pt had difficulty breathing.      Pt reports she has occasional shortness of breath due to her anxiety. Pt really wanted to have an in person appt with a provider who works with Dr. Stockton. Assisted in scheduling pt with Dr. Teran tomorrow.      Reviewed with pt that if she feels she needs to go to the ER tonight d/t difficulty breathing, etc, to go to ER.  Pt understanding and appreciative of call.       Patient has additional questions:  Yes  If YES, what are your questions/concerns:  Patient requested a call from Jenn MOE to discuss directly . Patient refused to give further information to writer .  Okay to leave a detailed message?: No

## 2021-06-17 NOTE — TELEPHONE ENCOUNTER
Telephone Encounter by Erin Blake at 6/22/2020  8:58 AM     Author: Erin Blake Service: -- Author Type: --    Filed: 6/22/2020  8:58 AM Encounter Date: 6/19/2020 Status: Signed    : Erin Blake APPROVED:    Approval start date: 3/22/2020  Approval end date:  6/20/2021    Pharmacy has been notified of approval and will contact patient when medication is ready for pickup.

## 2021-06-17 NOTE — TELEPHONE ENCOUNTER
Her orders are signed.    Why are we calling for A1c in people who have had it checked within 6 months? Stable patients do not need to come more than every 6 months

## 2021-06-17 NOTE — TELEPHONE ENCOUNTER
Telephone Encounter by Karissa Cavazos RN at 4/13/2020 12:21 PM     Author: Karissa Cavazos RN Service: -- Author Type: Registered Nurse    Filed: 4/13/2020 12:23 PM Encounter Date: 4/13/2020 Status: Signed    : Karissa Cavazos RN (Registered Nurse)       Medication being requested: Butrans  Last visit date: 2/11/20.  Provider: BE  Next visit date: 4/23/20.  Provider: BE  Expected follow up: 8 weeks  MTM visit ([facility]) date: No  UDT date: 7/2019  Agreement date: 6/2019   snipped in:    Pertinent between visit information about requested medication (telephone, mychart, prior authorization, concerns, comments): No  Script being sent to provider by nurse- dates and quantity:   Requested Prescriptions     Pending Prescriptions Disp Refills   ? buprenorphine (BUTRANS) 10 mcg/hour PTWK patch 4 patch 0     Sig: Place 1 patch on the skin every 7 days.     [facility] cued: [facility]  Standing orders for withdrawal protocol implemented: SONIYA

## 2021-06-17 NOTE — TELEPHONE ENCOUNTER
Telephone Encounter by Erin Blake at 12/8/2020  3:38 PM     Author: Erin Blake Service: -- Author Type: --    Filed: 12/8/2020  3:38 PM Encounter Date: 12/8/2020 Status: Signed    : Erin Blake APPROVED:    Approval start date: 9/9/2020  Approval end date:  12/8/2021    Pharmacy has been notified of approval and will contact patient when medication is ready for pickup.

## 2021-06-17 NOTE — TELEPHONE ENCOUNTER
Pt is coming for lab work this Friday. She was wondering if she could have her lipids rechecked?  She's not sure she will be able to fast that day but will come back a different day for lipid if needed.  She also says Mahin's office keeps calling her to have her A1c checked but there aren't current orders for that. Please place those orders as well if needed.  Thanks.

## 2021-06-17 NOTE — PROGRESS NOTES
Progress Note    Patient Name: Randi Cleary  Date: 6/17/21         Service Type: Individual      Session Start Time: 4:01 PM  Session End Time: 4:46 PM     Session Length: 45 minutes    Session #: 44    Attendees: Client attended alone    Service Modality:  Video Visit:    Telemedicine Visit: The patient's condition can be safely assessed and treated via synchronous audio and visual telemedicine encounter.      Reason for Telemedicine Visit: Services only offered telehealth    Originating Site (Patient Location): Patient's home    Distant Site (Provider Location): Provider Remote Setting- Home Office    Consent:  The patient/guardian has verbally consented to: the potential risks and benefits of telemedicine (video visit) versus in person care; bill my insurance or make self-payment for services provided; and responsibility for payment of non-covered services.     Mode of Communication:  Video Conference via [employer], last 10 minutes were via phone due to technical problems    As the provider I attest to compliance with applicable laws and regulations related to telemedicine.      Treatment Plan Last Reviewed: 6/10/21  PHQ-9 / CHAVO-7 :   PHQ 5/12/2021 5/21/2021 6/10/2021   PHQ-9 Total Score 12 7 10   Q9: Thoughts of better off dead/self-harm past 2 weeks Not at all Not at all Not at all   F/U: Thoughts of suicide or self-harm - - -   F/U: Self harm-plan - - -   F/U: Self-harm action - - -   F/U: Safety concerns - - -   Some encounter information is confidential and restricted. Go to Review Flowsheets activity to see all data.     CHAVO-7 SCORE 5/12/2021 5/21/2021 6/10/2021   Total Score 9 (mild anxiety) 9 (mild anxiety) 11 (moderate anxiety)   Total Score 9 9 11   Some encounter information is confidential and restricted. Go to Review Flowsheets activity to see all data.         DATA  Interactive Complexity: No  Crisis: No       Progress Since Last Session (Related to  Symptoms / Goals / Homework):   Symptoms: Patient is starting to take better care of her home    Homework: Achieved / completed to satisfaction  Take care of your physical health, continue to find hope -done     Episode of Care Goals: Satisfactory progress - ACTION (Actively working towards change); Intervened by reinforcing change plan / affirming steps taken     Current / Ongoing Stressors and Concerns:   Patient is currently socially isolated. She has a conflictual relationship with her .  She is getting minimal physical activity.  She had recent eye surgery     Treatment Objective(s) Addressed in This Session:   Patient will increase frequency of engaging her in ADLs.  Patient will track and record at least 5 pleasant exchanges with . Patient will be able to identify at least 5 positive traits about her .  Patient will reduce level of depressive and anxious features as evidenced by reduction in score on her CHAVO-7 and PHQ-9 (scores of 15 and 16 at first measurment, respectively).     Intervention:   CBT: Person-Centered Therapy: Patient shared frustrations with all the surgeries being cancelled due to having COVID.Talked about ways she is finding britney, including gardening.       ASSESSMENT: Current Emotional / Mental Status (status of significant symptoms):   Risk status (Self / Other harm or suicidal ideation)   Patient denies current fears or concerns for personal safety.   Patient denies current or recent suicidal ideation or behaviors.   Patient denies current or recent homicidal ideation or behaviors.   Patient denies current or recent self injurious behavior or ideation.   Patient denies other safety concerns.   Patient reports there has been no change in risk factors since their last session.     Patient reports there has been no change in protective factors since their last session.     A safety and risk management plan has been developed including: Patient consented to co-developed  safety plan.  Safety and risk management plan was completed.  Patient agreed to use safety plan should any safety concerns arise.  A copy was given to the patient. This was done on 2020 and is attached to the bottom of the note.     Appearance:   Appropriate    Eye Contact:   Fair    Psychomotor Behavior: Normal    Attitude:   Cooperative    Orientation:   All   Speech    Rate / Production: Normal/ Responsive    Volume:  Normal    Mood:    frustrated, yet hopeful   Affect:    Appropriate    Thought Content:  Clear    Thought Form:  Coherent    Insight:    Fair      Medication Review:   No changes to current psychiatric medication(s)     Medication Compliance:   Yes     Changes in Health Issues:   None reported     Chemical Use Review:   Substance Use: Chemical use reviewed, no active concerns identified      Tobacco Use: No current tobacco use.      Diagnosis:  1. Major depressive disorder, recurrent episode, moderate with anxious distress (H)    2. CHAVO (generalized anxiety disorder)      Collateral Reports Completed:   Not Applicable    PLAN: (Patient Tasks / Therapist Tasks / Other)  Continue to find hope.   Keep busy with the planting.    MICAELA SLADE    2021                                                         ______________________________________________________________________    Treatment Plan    Patient's Name: Randi Cleary  YOB: 1955    Date: 6/10/21    DSM5 Diagnoses: 296.32 (F33.1) Major Depressive Disorder, Recurrent Episode, Moderate With anxious distress  Psychosocial / Contextual Factors: Patient's entire family of origin has , she now has a sister-in-law and  as support.  Relationship with  is conflictual.  Patient is reporting increase in physical symptoms due to COVID-19.  Patient is off of pain medications.  WHODAS: 42    Referral / Collaboration:  Referral to another professional/service is not indicated at this time.     Anticipated  "number of session or this episode of care: 12-15      MeasurableTreatment Goal(s) related to diagnosis / functional impairment(s)  Goal 1: Patient will \"jumpstart, getting going with the things I need to be doing around the house as far as picking up, doing things, trying to do something every day.  Also to lessen the animosity between me and my .\"    I will know I've met my goal when my shoulders are fixed and I can see.      Objective #A (Patient Action)    Patient will increase frequency of engaging her in ADLs.  Status: Continued - Date(s): 6/10/21    Intervention(s)  Therapist will engage patient in CBT, specifically behavioral activation.    Objective #B  Patient will track and record at least 5 pleasant exchanges with . Patient will be able to identify at least 5 positive traits about her  and how he relates to her.  Status: Continued - Date(s): 6/10/21    Intervention(s)  Therapist will teach assertiveness skills and assign homework related to relationship interactions.    Objective #C  Patient will reduce level of depressive and anxious features as evidenced by reduction in score on her CHAVO-7 and PHQ-9 (scores of 15 and 16 at first measurment, respectively).  Status: Continued - Date(s):  6/10/21    Intervention(s)  Therapist will engage patient in person-centered therapy and CBT.    Patient has reviewed and agreed to the above plan.      MICAELA SLADE  Jenny 10, 2021                                                Randi Cleary          SAFETY PLAN:  Step 1: Warning signs / cues (Thoughts, images, mood, situation, behavior) that a crisis may be developing:  ? Thoughts: \"I don't want to continue\" \"I am unwanted\"  ? Images: none  ? Thinking Processes: ruminating  ? Mood: anger  ? Behaviors: isolating/withdrawing , can't stop crying, not taking care of myself and not taking care of my responsibilities  ? Situations: small triggers, such as not being able to find something, or " "dropping something   Step 2: Coping strategies - Things I can do to take my mind off of my problems without contacting another person (relaxation technique, physical activity):  ? Distress Tolerance Strategies:  arts and crafts: drawing, play with my pet , listen to positive and upbeat music: any, change body temperature (ice pack/cold water)  and paced breathing/progressive muscle relaxation  ? Physical Activities: go for a walk, deep breathing and stretching   ? Focus on helpful thoughts:  \"You've been through this before, you can get through it again.\"  Step 3: People and social settings that provide distraction:                 Name: Carmen                            Name: Darien                           Name: Aleida       ? pool, shopping, Carmen's house, [employer]       Step 4: Remind myself of people and things that are important to me and worth living for:  Clifford Little Donna, post-COVID world, options of what could be in your future        Step 5: When I am in crisis, I can ask these people to help me use my safety plan:                 Name: Sidney  Step 6: Making the environment safe:   ? go to sleep/daydream  Step 7: Professionals or agencies I can contact during a crisis:  ? [facility] Daytime Number: [phone]  ? Suicide Prevention Lifeline: 7-091-206-UFXS (4197)  ? Crisis Text Line Service (available 24 hours a day, 7 days a week): Text [place] to 124345    Local Crisis Services: [facility] Crisis: [phone]     Call 911 or go to my nearest emergency department.       I helped develop this safety plan and agree to use it when needed.  I have been given a copy of this plan.       Client signature _________________________________________________________________  Today s date:  11/24/2020  Adapted from Safety Plan Template 2008 Randi Poole and Robby Barba is reprinted with the express permission of the authors.  No portion of the Safety Plan Template may be reproduced without " the express, written permission.  You can contact the authors at [email or web address] or [email or web address].

## 2021-06-17 NOTE — PROGRESS NOTES
Assessment/Plan:     Problem List Items Addressed This Visit     Restless Legs Syndrome    Relevant Medications    rOPINIRole (REQUIP) 2 MG tablet              No follow-ups on file.    Patient Instructions   PLAN:   You will discuss with your primary care provider other resource to evaluate your concern about restless legs and sleep problems such as sleep medicine or neurology.    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    Continue with the magnesium supplementation.    You will continue to work with orthopedics having your left carpal tunnel syndrome scheduled next month.    Call Dr. Dubois with questions follow-up in 6 to 8 weeks.        Subjective:       65 y.o. female Randi Damon is a 65 y.o. female who is being evaluated via a billable video visit.      How would you like to obtain your AVS? MyChart.  If dropped from the video visit, the video invitation should be resent by:   Will anyone else be joining your video visit?       Video Start Time:         Subjective   Randi Damon is 65 y.o. and presents today for the following health issues   HPI    followed primarily for mood disorder as patient was involved in the pain center.    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  She was concerned about both arms tingling and numb from shoulders to fingertips.  She initially thought her shoulders with the problems, then was evaluated and told there concerns with her neck which need to be addressed first.  She had injections in the back and the front.  This told she may to have a fusion.  She however is apprehensive about that as she has had experience with her ankle being fused and then artificial joint made.  There is discussion whether she is a candidate for an artificial cervical disc and will need several levels.    She has also had a right carpal tunnel syndrome addressed and will have the left next month.    She has had trouble with her right foot including problems with  "calluses and has developed a hammertoe.  On evaluation told she has a bone spur and will need screw placed after her hammertoe.  She is putting out after June.    She has been able to go back to [employer] [place] in the pool doing physical therapy which helps her neck and restless legs.  Physical therapist tells her she looks more \"with it\" and she acknowledges she is having a better mood.    She is pleased to be off opioids.  She did take one of the hydrocodone that was offered after carpal tunnel syndrome.    She wonders how much her restless legs may be more of a problem off opioids as she has been told opioid use to treat this.  Continues with \"dread\" at night about the restless legs noting sometimes the ropinirole helps other times it does not.  She may size sleep only 3 to 4 hours a night.  She has taken her magnesium supplementation and other vitamins.  Stop lithium soon after we saw her last.    She is not using her CPAP device.  She wonders what else can be done for the restless leg syndrome.    Reviewed have made adjustments in different agents for restless legs.  There was significant focus on her mood if she came off other antidepressants.  She is presently feeling her mood is much more acceptable.  Want to make any other changes in treatment presently.      Current Outpatient Medications:      albuterol (PROAIR HFA;PROVENTIL HFA;VENTOLIN HFA) 90 mcg/actuation inhaler, Inhale 2 puffs every 4 (four) hours as needed., Disp: 8.5 g, Rfl: 12     albuterol (PROVENTIL) 2.5 mg /3 mL (0.083 %) nebulizer solution, Take 3 mL (2.5 mg total) by nebulization every 6 (six) hours as needed for wheezing., Disp: 75 mL, Rfl: 12     aspirin 81 MG EC tablet, Take 81 mg by mouth daily., Disp: , Rfl:      cholecalciferol, vitamin D3, (VITAMIN D3) 5,000 unit Tab, Take 10,000 Units by mouth daily., Disp: , Rfl:      cyanocobalamin, vitamin B-12, 5,000 mcg/mL Drop, Take 5,000 mcg by mouth daily. , Disp: , Rfl:      " fluticasone-umeclidinium-vilanterol (TRELEGY ELLIPTA) 100-62.5-25 mcg DsDv inhaler, Inhale 1 Inhalation daily., Disp: 3 each, Rfl: 3     furosemide (LASIX) 20 MG tablet, Take 1 tablet (20 mg total) by mouth daily., Disp: 180 tablet, Rfl: 3     ketorolac (ACULAR) 0.5 % ophthalmic solution, Place 1 drop into left eye three times a day as directed.  Start 3 days before surgery, Disp: , Rfl:      KLOR-CON M20 20 mEq tablet, Take 1 tablet (20 mEq total) by mouth 2 (two) times a day., Disp: 180 tablet, Rfl: 3     Lactobacillus rhamnosus GG (CULTURELLE) 10-15 Billion cell capsule, Take 1 capsule by mouth daily., Disp: , Rfl:      levothyroxine (SYNTHROID, LEVOTHROID) 150 MCG tablet, Take 1 tablet (150 mcg total) by mouth daily. TAY SYNTHROID, Disp: 90 tablet, Rfl: 2     losartan (COZAAR) 25 MG tablet, Take 1 tablet (25 mg total) by mouth daily., Disp: 90 tablet, Rfl: 3     methocarbamoL (ROBAXIN) 500 MG tablet, Take 500 mg by mouth at bedtime. , Disp: , Rfl:      ofloxacin (OCUFLOX) 0.3 % ophthalmic solution, Instill 1 drop into left eye three times a day as directed. Start 3 days before surgery, Disp: , Rfl:      omeprazole (PRILOSEC) 20 MG capsule, TAKE 1 CAPSULE BY MOUTH 2 TIMES A DAY, Disp: 180 capsule, Rfl: 2     prednisoLONE acetate (PRED-FORTE) 1 % ophthalmic suspension, place 1 drop into the left eye three times a day as directed. start 3 days before surgery., Disp:  , Rfl: 0     rOPINIRole (REQUIP) 2 MG tablet, Take 1 tablet (2 mg total) by mouth at bedtime., Disp: 30 tablet, Rfl: 5     UNABLE TO FIND, Take 6 tablets by mouth daily. Med Name: reacted behzad-mag supplement - 200 mg calcium, 40 phosphorus, 175 magnesium, Disp: , Rfl:      UNABLE TO FIND, Take 500 mg by mouth daily. Med Name: Tumeric, Disp: , Rfl:      UNABLE TO FIND, Take 1 tablet by mouth daily. Med Name: methyl-B complex, Disp: , Rfl:      HYDROcodone-acetaminophen 5-325 mg per tablet, Take 1-2 tablets by mouth every 6 (six) hours as needed., Disp: ,  "Rfl:     Current Facility-Administered Medications:      cyanocobalamin injection 1,000 mcg, 1,000 mcg, Intramuscular, Q30 Days, Loida Stockton MD, 1,000 mcg at 05/28/19 0912    By video alert, clear sensorium.  Thought process logical.  Affect is fairly full range and indeed appears much less distressed than many other times seen.      Additional provider notes:     Review of Systems        Objective    Vitals - Patient Reported  Pain Score:   8    Physical Exam              Video-Visit Details    Type of service:  Video Visit    Video End Time (time video stopped):   Originating Location (pt. Location): Home    Distant Location (provider location):  [facility]     Platform used for Video Visit: [facility]           Objective:     Vitals:    05/20/21 0942   Height: 5' 6\" (1.676 m)   PainSc:   8     Plan: No change in any other psychotropics.    She has an appointment with her primary care provider this week, encouraged her to address other options to pursue the restless legs which are creating some anticipatory anxiety, such as through sleep medicine or neurology.    She will call if any other changes occur and follow-up in several weeks.    Total time more than 25 minutes              This note has been dictated using voice recognition software. Any grammatical or context distortions are unintentional and inherent to the software  "

## 2021-06-17 NOTE — TELEPHONE ENCOUNTER
Telephone Encounter by Casi Durbin at 3/11/2021  8:26 AM     Author: Casi Durbin Service: -- Author Type: --    Filed: 3/11/2021  8:27 AM Encounter Date: 3/5/2021 Status: Signed    : Casi Durbin APPROVED:    Approval start date: 01/01/2021  Approval end date:  12/31/2021    Pharmacy has been notified of approval and will contact patient when medication is ready for pickup.

## 2021-06-18 NOTE — LETTER
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 the emergency response system (dial 911).      Patient Arrived @ 0805 for a 0840 appointment.     TT: [id number]  - [phone]    Consuelo Little APRN FN-BC  [address] [facility] [address]   [phone]  [phone]      Reina Kimball CMA  2/15/2019  8:11 AM  Sign at close encounter  Patient is here for a follow up with Anne-Marie Little CNP for her shoulder and neck pain                *Universal Precautions:    UDT/SWAB- 04/14/2018  Consent/Agreement- 06/15/2018  Pharmacy- as documented    Count- n/a   Psychological evaluation last OV 02/06/2019 with Mabel Merida  6/15/18 MME=50  02/15/19 MME= 45  Pharmacogenetic testing- n/a  MTM: n/a  Naloxone safety: 10/16/2018
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
## 2021-06-18 NOTE — PROGRESS NOTES
[facility] Follow Up Note    Randi Damon   62 y.o. female    Date of Visit: 5/25/2018    Chief Complaint   Patient presents with     COPD     COPD flare x 5 days. chest congestion, brow/green phlegm.      Subjective  Randi see me for exacerbation of existing problem of her underlying emphysema.  She quit smoking a number of years ago.  She has about 2-3 emphysema exacerbations per year, usually treated with a Z-Roman and a 5 day course of prednisone which he says works well for her.  She also requests refill of her Robitussin with codeine.    Patient has chronic emphysema with minimal shortness of breath at baseline.  He does have albuterol as needed.  Not on oxygen or chronic steroids.    Quit smoking 2003.    She was in her normal state of health until 3 days ago when she developed significant malaise and fatigue.  She developed a sore throat and increased cough, initially minimally productive but became productive for white sputum.  No hemoptysis.  No fevers.  She has some mild increased dyspnea on exertion, but not severe.  She can catch her breath at rest.    Her  drives a school bus, was ill a few days before her.  She also been at the [facility] seeing her brother on hospice.    No chest pain or palpitations.  No increased edema.  No abdominal pain or rash.    No sinus pain or ear pain.    No recent travel.    Past medical history of sleep apnea with CPAP.  No flare of anxiety or sebastian history.  She states she has tolerated prednisone well in the past.    PMHx:    Past Medical History:   Diagnosis Date     Anxiety      Breast cancer 1994     Chronic pain syndrome      COPD (chronic obstructive pulmonary disease)      Depression      Esophageal reflux      Graves disease      Hemochromatosis      Hx antineoplastic chemotherapy      Hx of radiation therapy      Hypertension      Impaired fasting glucose      Pheochromocytoma      Psoriatic arthropathy      Restless  "leg syndrome      Right rotator cuff tear      Sleep apnea 2017    CPAP     Spinal stenosis      Urinary incontinence      Vitamin B12 deficiency      PSHx:    Past Surgical History:   Procedure Laterality Date     BREAST BIOPSY       BREAST LUMPECTOMY Left 1994     JOINT REPLACEMENT       LAPAROSCOPIC ADRENALECTOMY Left 8/2/2017    Procedure: LAPAROSCOPIC LEFT ADRENALECTOMY, ;  Surgeon: Gab Linares MD;  Location: [facility];  Service:      MASTECTOMY      left lumpectomy with axillary node dissection     WY ARTHRODESIS,ANKLE,OPEN Right     Description: Ankle Arthrodesis;  Recorded: 04/07/2010;     WY MASTECTOMY, MODIFIED RADICAL      Description: Modified Radical Mastectomy Left Breast;  Recorded: 04/07/2010;     W[place] REMOVE TONSILS/ADENOIDS,<13 Y/O      Description: Tonsillectomy With Adenoidectomy;  Recorded: 04/07/2010;     reconstructive breast surgery Right      REDUCTION MAMMAPLASTY Right     approx late [id number]     TONSILLECTOMY       Immunizations:   Immunization History   Administered Date(s) Administered     Influenza [id number], 02/23/2010     Influenza, inj, historic,unspecified 12/04/2007, 12/10/2008, 12/10/2008, 02/23/2010, 09/17/2010, 09/07/2011     Influenza, seasonal,quad inj 36+ mos 10/27/2015, 09/06/2017     Influenza, seasonal,quad inj 6-35 mos 11/14/2012, 10/16/2013, 10/01/2014     Pneumo Conj 13-V (2010&after) 12/10/2014     Pneumo Polysac 23-V 03/23/2000     Tdap 05/21/2008       ROS A comprehensive review of systems was performed and was otherwise negative    Medications, allergies, and problem list were reviewed and updated    Exam  /76  Pulse (!) 109  Ht 5' 6\" (1.676 m)  Wt (!) 239 lb (108.4 kg)  SpO2 91%  BMI 38.58 kg/m2  Moderate diffuse pharyngitis without exudate.  No JVD and no cervical adenopathy.  Lungs with slightly reduced respiratory excursion but still moving air fairly well.  There is some slight expiratory wheezing bilaterally but not severe and there " is no crackles or dullness.  He is moderately overweight.  Heart is regular and no ankle edema.  Abdomen is nontender.  No conjunctivitis good skin without rash.    Assessment/Plan  1. COPD exacerbation, exacerbation of chronic problem requiring treatment.  Exacerbation likely from viral URI.    Given her COPD and response to below treatment in the past, I will give her the antibiotic and prednisone.    Patient was warned there is some risk of steroids and antibiotics.  She accepts this risk and wishes to proceed.    She was given warnings on return to the ER being reevaluated next week if not getting better or for any worsening shortness of breath over the weekend.  - azithromycin (ZITHROMAX) 250 MG tablet; Take two pills the first day and then one pill a day for four more days.  Dispense: 6 tablet; Refill: 0  - predniSONE (DELTASONE) 20 MG tablet; Take 1 tablet (20 mg total) by mouth 2 (two) times a day for 5 days.  Dispense: 10 tablet; Refill: 0  - codeine-guaiFENesin (GUAIFENESIN AC)  mg/5 mL liquid; Take 5 mL by mouth 3 (three) times a day as needed for cough.  Dispense: 240 mL; Refill: 0    Continue Symbicort and albuterol nebulizer as current.    2. Viral upper respiratory tract infection  Likely caught from .    Return if symptoms worsen or fail to improve.   Patient Instructions   Proceed with azithromycin antibiotic and prednisone treatment for your COPD exacerbation.    Codeine cough syrup for cough.  Codeine may cause constipation, treat appropriately if needed.    Follow-up with Dr. Loida Stockton next week if not better.    If significant increasing shortness of breath, the emergency room for evaluation this weekend.    Rd Ackerman MD      Current Outpatient Prescriptions   Medication Sig Dispense Refill     albuterol (PROAIR HFA;PROVENTIL HFA;VENTOLIN HFA) 90 mcg/actuation inhaler Inhale 2 puffs every 6 (six) hours as needed for wheezing.       albuterol (PROVENTIL) 2.5 mg /3 mL (0.083  %) nebulizer solution Take 3 mL (2.5 mg total) by nebulization every 6 (six) hours as needed for wheezing. 75 mL 12     alclomethasone (ACLOVATE) 0.05 % cream        betamethasone valerate (VALISONE) 0.1 % ointment Apply hs 45 g 1     clobetasol (TEMOVATE) 0.05 % cream        cyanocobalamin, vitamin B-12, 2,500 mcg Subl Place under the tongue 2 (two) times a week.       diclofenac sodium (VOLTAREN) 1 % Gel Apply 1 application topically 2 (two) times a day as needed (pain on shoulder and ankle). 100 g 12     DULoxetine (CYMBALTA) 60 MG capsule Take 60 mg by mouth at bedtime.  1     ergocalciferol (VITAMIN D2) 50,000 unit capsule Take 1 capsule (50,000 Units total) by mouth 2 (two) times a week. 24 capsule 3     folic acid (FOLVITE) 1 MG tablet TAKE 1 TABLET (1 MG) BY ORAL ROUTE ONCE DAILY 90 tablet 3     HYDROcodone-acetaminophen (NORCO )  mg per tablet Take 1 tablet by mouth every 6 (six) hours as needed for pain. 112 tablet 0     levothyroxine (SYNTHROID, LEVOTHROID) 150 MCG tablet Take 1 tablet (150 mcg total) by mouth every other day. Alternate with 175mcg tablets 15 tablet 12     levothyroxine (SYNTHROID, LEVOTHROID) 175 MCG tablet Take 1 tablet (175 mcg total) by mouth every other day. Alternate with 150mcg tablets 15 tablet 12     LORazepam (ATIVAN) 1 MG tablet Take 1 mg by mouth 2 (two) times a day as needed.   0     omeprazole (PRILOSEC) 20 MG capsule TAKE 1 CAPSULE BY MOUTH 2 TIMES A  capsule 3     polyethylene glycol (MIRALAX) 17 gram packet Take 1 packet (17 g total) by mouth daily. 30 packet 5     pramipexole (MIRAPEX) 1 MG tablet Take 0.5-1 tablets (0.5-1 mg total) by mouth 2 (two) times a day. 60 tablet 5     temazepam (RESTORIL) 7.5 MG capsule        azithromycin (ZITHROMAX) 250 MG tablet Take two pills the first day and then one pill a day for four more days. 6 tablet 0     codeine-guaiFENesin (GUAIFENESIN AC)  mg/5 mL liquid Take 5 mL by mouth 3 (three) times a day as needed  for cough. 240 mL 0     predniSONE (DELTASONE) 20 MG tablet Take 1 tablet (20 mg total) by mouth 2 (two) times a day for 5 days. 10 tablet 0     No current facility-administered medications for this visit.      Allergies   Allergen Reactions     Cephalexin Other (See Comments)     Muscles aches,fatigue     Levofloxacin      Reaction unknown     Penicillins      Mouth sores     Sulfa (Sulfonamide Antibiotics)      Reaction not known     Social History   Substance Use Topics     Smoking status: Former Smoker     Quit date: 8/1/2003     Smokeless tobacco: Never Used     Alcohol use No

## 2021-06-19 NOTE — PROGRESS NOTES
"[facility] Follow Up Note    Assessment/Plan:  1. Dermatitis/psoriasis  She is not necessarily following the plan outlined by her dermatologist Dr. Espana.  She has significant dermatitis on her hands and on her right eyelid.  There is some crusting noted as well.  Recommendation: Triamcinolone cream on a Q-tip to the dermatitis on the lid at bedtime.  Normal saline/natural tears lubricant to the eyes.  Return to dermatology for further advice.  Will update labs  - Ambulatory referral to Dermatology  - Vitamin B12  - Vitamin D, Total (25-Hydroxy)    2. Psoriatic arthritis  She is not keen on any immunomodulators for her psoriatic arthritis or dermatitis.  She had seen Dr. Giovana fam it is in the past.  Recommendation: Consideration of another rheumatologic opinion with regard to psoriatic arthritis.  Of note, she has to be willing to be compliant with their recommendations.    3. Fatigue  We will update labs  - Thyroid Cascade    4. Bug bite  Recent bug bite to the right thigh with large wheal that has since resolved.  Now she is feeling achy  Recommendations:  - Lyme Antibody Hood; Future    5. Hereditary hemochromatosis (H)  Due for an update on labs  - HM2(CBC w/o Differential)  - Comprehensive Metabolic Panel  - Ferritin          Loida Stockton MD    Chief Complaint:  Chief Complaint   Patient presents with     Eye Problem       History of Present Illness:  Randi is a 62 y.o. female who is here today for evaluation of dermatitis of the right eyelid.  Of note, she has been seeing dermatology for her psoriatic dermatitis and arthritis.  She is really been noncompliant with their recommendations.  She states that her eye is crusty and itchy.  Sometimes there is a clear drainage.  She does have a cream from Dr. Espana that she is not using as she has decided it is \"too harsh \".  Of note, she expresses disdain for any immune modulators.  She is seen the commercials and they are " fraught with side effects.  She would like a new dermatologist that is closer to her home.  Of note, also, she has seen rheumatology remotely in the past.  Dr. Jose ring has has retired.  She has not seen any follow-up physicians.    She continues to complain of a variety of issues in a disorganized manner.  Her CPAP does not work correctly and she has nasal crusting.  She is feeling fatigued with arthralgias and myalgias.  She cannot lose weight.    Her , however, is managing to lose weight with a healthier diet.  She states that she is given up gluten in its entirety.  She cannot understand why she continues to gain weight.    Review of Systems:  A comprehensive review of systems was performed and was otherwise negative    PFSH:  Social History: She is  with no children  History   Smoking Status     Former Smoker     Quit date: 8/1/2003   Smokeless Tobacco     Never Used       Past History: Complex and significant for multiple issues  Current Outpatient Prescriptions   Medication Sig Dispense Refill     albuterol (PROAIR HFA;PROVENTIL HFA;VENTOLIN HFA) 90 mcg/actuation inhaler Inhale 2 puffs every 6 (six) hours as needed for wheezing.       albuterol (PROVENTIL) 2.5 mg /3 mL (0.083 %) nebulizer solution Take 3 mL (2.5 mg total) by nebulization every 6 (six) hours as needed for wheezing. 75 mL 12     alclomethasone (ACLOVATE) 0.05 % cream        betamethasone valerate (VALISONE) 0.1 % ointment Apply hs 45 g 1     clobetasol (TEMOVATE) 0.05 % cream        cyanocobalamin, vitamin B-12, 2,500 mcg Subl Place under the tongue 2 (two) times a week.       diclofenac sodium (VOLTAREN) 1 % Gel Apply 1 application topically 2 (two) times a day as needed (pain on shoulder and ankle). 100 g 12     DULoxetine (CYMBALTA) 60 MG capsule Take 60 mg by mouth at bedtime.  1     ergocalciferol (VITAMIN D2) 50,000 unit capsule Take 1 capsule (50,000 Units total) by mouth 2 (two) times a week. 24 capsule 3     folic acid  (FOLVITE) 1 MG tablet TAKE 1 TABLET (1 MG) BY ORAL ROUTE ONCE DAILY 90 tablet 3     HYDROcodone-acetaminophen (NORCO )  mg per tablet Take 1 tablet by mouth every 4 (four) hours as needed for pain (max of 5 per day). 140 tablet 0     levothyroxine (SYNTHROID, LEVOTHROID) 150 MCG tablet Take 1 tablet (150 mcg total) by mouth every other day. Alternate with 175mcg tablets 15 tablet 12     levothyroxine (SYNTHROID, LEVOTHROID) 175 MCG tablet TAKE 1 TAB BY MOUTH EVERY OTHER DAY ALTERNATING WITH  MCG DOSE 90 tablet 0     LORazepam (ATIVAN) 1 MG tablet Take 1 mg by mouth 2 (two) times a day as needed.   0     omeprazole (PRILOSEC) 20 MG capsule TAKE 1 CAPSULE BY MOUTH 2 TIMES A  capsule 3     polyethylene glycol (MIRALAX) 17 gram packet Take 1 packet (17 g total) by mouth daily. 30 packet 5     pramipexole (MIRAPEX) 1 MG tablet Take 0.5-1 tablets (0.5-1 mg total) by mouth 2 (two) times a day. 60 tablet 5     temazepam (RESTORIL) 7.5 MG capsule        triamcinolone (KENALOG) 0.1 % cream Apply a thin layer to affected area once daily 15 g 0     No current facility-administered medications for this visit.        Family History: Nothing new    Physical Exam:  General Appearance:   She is pleasant but perseverates on many topics.  She is easily distracted  Vitals:    08/07/18 0736 08/07/18 0752   BP: 148/82 128/82   Patient Site: Left Arm    Patient Position: Sitting    Cuff Size: Adult Large    Pulse: (!) 112    SpO2: 94%    Weight: (!) 241 lb (109.3 kg)      Wt Readings from Last 3 Encounters:   08/07/18 (!) 241 lb (109.3 kg)   06/15/18 (!) 240 lb (108.9 kg)   05/25/18 (!) 239 lb (108.4 kg)     Body mass index is 38.9 kg/(m^2).    Right eyelid is compatible with dermatitis.  Hands, bilaterally with severe psoriatic dermatitis and signs of arthritis.

## 2021-06-19 NOTE — LETTER
Letter by Reina Morillo MD at      Author: Reina Morillo MD Service: -- Author Type: --    Filed:  Encounter Date: 7/1/2019 Status: (Other)         Randi Damon  [address] [address]    July 1, 2019    Dear Ms. Nelson,    Welcome to [facility]! Your appointment information is below.   Please bring the following to your appointment:    Insurance Card, so we may scan it for our records    Drivers license or valid ID, so we may scan it for our records    Co-pay (as applicable per your insurance plan)    A current list of your medications including over the counter products such as vitamins and supplements    Your medical records including copies of X-Ray films if you are transferring your care from another clinic.  If you do not have your records, please fill out the release of information form and we will request those records.     Provider: Reina Morillo MD  Appointment Date: August 21, 2019  Arrival Time: 10:00am for PFT test and 11:00am for Dr. Morillo    Location: [address]        [facility], [address]    **Please allow adequate time for your commute and parking. If you are more than 10 minutes late, you may be asked to reschedule.     If you need to cancel or reschedule your appointment, please notify us at least 24 hours prior to your appointment time so we are able to make this time available for another patient.    Thank you for choosing the [facility] for your health care needs. If you have any questions, please do not hesitate to contact us at any time at   [phone]. We look forward to caring for you.     Sincerely,     [facility] staff

## 2021-06-19 NOTE — LETTER
Letter by Reina Morillo MD at      Author: Reina Morillo MD Service: -- Author Type: --    Filed:  Encounter Date: 8/21/2019 Status: (Other)         Loida Stockton MD  [address] [facility] [address]                                  August 21, 2019    Patient: Randi Damon   MR Number: [id number]   YOB: 1955   Date of Visit: 8/21/2019     Dear Dr. Mahin MD:    Thank you for referring Randi Damon to me for evaluation. Below are the relevant portions of my assessment and plan of care.    If you have questions, please do not hesitate to call me. I look forward to following Randi along with you.    Sincerely,        Reina Morillo MD          CC  MD Yisel Arias Nicole Lynn, MD  8/21/2019 12:37 PM  Sign at close encounter  Pulmonary Clinic Outpatient Consultation    Assessment and Plan:   This is a 64 y F with depression/anxiety, alcohol abuse, severe sleep apnea, and possible mild OHS not on CPAP, morbid obesity (BMI 42), 44+ pack year tobacco history in remission, COPD with mild osbtruction, GERD and hiatal hernia, chronic pain on opiods, hypothyroid after tx for Graves, pheochromocytoma resected ~2 years ago, who presents for an evaluation of COPD and exertional dyspnea.       PLAN by Issue:    1. COPD, GOLD A-B, mild obstruction  Has had issues with cost of inhalers. Currently on symbicort. No clear indication for inhaled steroid in her case, will not make changes at the present.    Continue symbicort - may change this to LAMA/LABA when she uses up current supply     Albuterol was re-ordered    She has a 44+ pack year smoking history, quit 20 years ago.     She is up to date on vaccinations    2. Pulmonary Nodule  4 mm, LLL. Stable since 4/2017, >2 years    No further scans indicated    She is not a candidate for lung cancer screening scans    3. Pulmonary Hypertension  Seen at the [facility] clinic. Likely mild if present, unable to estimate  RVSP and in her case likely to be group 2, 3 (?HFpEF), chronic lung disease, untreated severe sleep apnea and possible OHS.    She was referred to sleep/PSG already    Has a FH of connective tissue disease (SLE, scleroderma)    She is seeing the  in follow-up next month    4. Exertional Dyspnea  Due to COPD, morbid obesity, deconditioning.    Adjusting inhaler as above    Has many chronic issues with depression, chronic pain, ideally needs aggressive weight loss plan.      I will see her back in 2 months.      Reina Morillo MD  Pulmonary and Critical Care Medicine  [facility]  Office: [phone]  Pager: [phone]    ----------------------    Reason for Consult: COPD, shortness of breath     HPI:   This is a 64 y F with depression/anxiety, alcohol abuse, severe sleep apnea, not on CPAP, morbid obesity (BMI 42), 44+ pack year tobacco history in remission, COPD, GERD and hiatal hernia, chronic pain on opiods, hypothyroid after tx for Graves, pheochromocytoma resected ~2 years ago, who presents for an evaluation of COPD and exertional dyspnea.     She was very emotionally labile, tangential, and difficult to obtain a history from during our visit.     She reports she has had long standing ANNA. It has gotten worse over time, which she correlates with a 60+ lbs weight gain over the last 2 years. She has a chronic cough productive of clear phlegm. She notes significant depressive symptoms but no SI, feels no energy or motivation. She reports a recent relapse of alcohol abuse, but tells me she has been sober for 1 month however reported at [facility] visit 1 week ago she was drinking 2 bottles of wine daily.    She has severe KYAW per PSG but has not been using CPAP. She was referred to sleep again and has an appointment next month.    For her COPD she hs been on Symbicort intermittently due to cost, she just picked up another supply and has been back on for ~1 month. She is uncertain if it helps. She ran out  of albuterol last month. CAT 31 today. Not certain of her last exacerbation. She quit smoking ~20 years ago.    She recently saw Dr. Edwards at [facility] for a pulmonary HTN evaluation. She had an enlarged PA on a CT PE. The echo done there was unable to estimate PA pressure or diastolic function. It showed a mild reduction in RV function and was otherwise normal. She was referred for a sleep study and to hepatology for a history of possible hereditary hemochromatosis.       ROS:  A 12-system review was obtained and was negative with the exception of the symptoms endorsed in the history of present illness.    PMH:  Past Medical History:   Diagnosis Date   ? Anxiety    ? Breast cancer (H) 1994   ? Chronic pain syndrome    ? COPD (chronic obstructive pulmonary disease) (H)    ? Depression    ? Esophageal reflux    ? Graves disease    ? Hemochromatosis    ? Hx antineoplastic chemotherapy    ? Hx of radiation therapy    ? Hypertension    ? Impaired fasting glucose    ? Pheochromocytoma    ? Psoriasis    ? Psoriatic arthropathy (H)    ? Restless leg syndrome    ? Right rotator cuff tear    ? Sleep apnea 2017    CPAP   ? Spinal stenosis    ? Urinary incontinence    ? Vitamin B12 deficiency      PSH:  Past Surgical History:   Procedure Laterality Date   ? Adrenalectomy for pheochromocytoma     ? BREAST BIOPSY     ? BREAST LUMPECTOMY Left 1994   ? JOINT REPLACEMENT     ? LAPAROSCOPIC ADRENALECTOMY Left 8/2/2017    Procedure: LAPAROSCOPIC LEFT ADRENALECTOMY, ;  Surgeon: Gab Linares MD;  Location: [facility];  Service:    ? MASTECTOMY      left lumpectomy with axillary node dissection   ? MI ARTHRODESIS,ANKLE,OPEN Right     Description: Ankle Arthrodesis;  Recorded: 04/07/2010;   ? MI MASTECTOMY, MODIFIED RADICAL      Description: Modified Radical Mastectomy Left Breast;  Recorded: 04/07/2010;   ? MI REMOVE TONSILS/ADENOIDS,<13 Y/O      Description: Tonsillectomy With Adenoidectomy;  Recorded: 04/07/2010;   ?  reconstructive breast surgery Right    ? REDUCTION MAMMAPLASTY Right     approx late /[id number]   ? TONSILLECTOMY       Allergies:  Allergies   Allergen Reactions   ? Cephalexin Other (See Comments)     Muscles aches,fatigue   ? Gabapentin      Drove on the wrong side of the highway   ? Levofloxacin      Reaction unknown   ? Penicillins      Mouth sores   ? Sulfa (Sulfonamide Antibiotics)      Reaction not known     Family HX:  Family History   Problem Relation Age of Onset   ? Heart disease Father    ? Obesity Father    ? Hemochromatosis Father    ? Obesity Mother    ? Arthritis Mother    ? Obesity Sister    ? Cardiovascular Sister    ? Hypertension Sister    ? Arthritis Sister    ? Hemochromatosis Sister    ? Lupus Sister    ? Scleroderma Sister    ? Cardiovascular Brother    ? Hypertension Brother    ? Arthritis Brother    ? Hemochromatosis Brother    ? Prostate cancer Brother    ? Cardiovascular Maternal Grandmother    ? Stroke Paternal Grandmother    ? Breast cancer Neg Hx      Social Hx:  Social History     Socioeconomic History   ? Marital status:      Spouse name: Not on file   ? Number of children: Not on file   ? Years of education: Not on file   ? Highest education level: Not on file   Occupational History   ? Not on file   Social Needs   ? Financial resource strain: Not on file   ? Food insecurity:     Worry: Not on file     Inability: Not on file   ? Transportation needs:     Medical: Not on file     Non-medical: Not on file   Tobacco Use   ? Smoking status: Former Smoker     Last attempt to quit: 2003     Years since quittin.0   ? Smokeless tobacco: Never Used   Substance and Sexual Activity   ? Alcohol use: No   ? Drug use: No   ? Sexual activity: Not on file   Lifestyle   ? Physical activity:     Days per week: Not on file     Minutes per session: Not on file   ? Stress: Not on file   Relationships   ? Social connections:     Talks on phone: Not on file     Gets together: Not on  "file     Attends [employer] service: Not on file     Active member of club or organization: Not on file     Attends meetings of clubs or organizations: Not on file     Relationship status: Not on file   ? Intimate partner violence:     Fear of current or ex partner: Not on file     Emotionally abused: Not on file     Physically abused: Not on file     Forced sexual activity: Not on file   Other Topics Concern   ? Not on file   Social History Narrative   ? Not on file   Tobacco: 2-3 PPD for 22 years, quit >20 years ago  Prior drug abuse, EtOH dependency, had been sober 20 years now relapsed, nothing in the last 1 month  Disabled, then retired, worked in   Currently lives in [place] with   1 dog, 1 cat   No travel in 2 years    Physical Exam:  /78   Pulse 100   Resp 20   Ht 5' 6\" (1.676 m)   Wt (!) 263 lb (119.3 kg)   SpO2 94% Comment: RA  BMI 42.45 kg/m     Gen: Alert, oriented, no distress  HEENT: nasal turbinates are unremarkable, no oropharyngeal lesions, no cervical or supraclavicular lymphadenopathy  CV: RRR, no M/G/R  Resp: Decreased, CTAB, no focal crackles or wheezes  Abd: obese, soft, nontender, no palpable organomegaly  Skin: no apparent rashes  Ext: no cyanosis, clubbing, trace bl ankle edema  Neuro: alert, nonfocal    Labs:  Reviewed  ABG 7.40/43    Imaging studies:  Personally reviewed:    6/24/19 CT PE:  ANGIOGRAM CHEST: Atherosclerotic plaque including coronary artery calcification. No aortic aneurysm or dissection. Mildly prominent right and left main pulmonary arteries suggestive of pulmonary artery hypertension. No pulmonary emboli.     RV/LV RATIO: N/A     LUNGS AND PLEURA: Stable benign 4 mm pulmonary nodule pleural-based on image #77 at the left lung base. Mild to moderate scattered fibroatelectasis. Diffuse air trapping often associated with small vessel or small airways disease. No effusion.     MEDIASTINUM: Moderate sized hiatal hernia. No mass or " adenopathy.     LIMITED UPPER ABDOMEN: Hepatic steatosis. Left adrenal mass on prior not imaged.     MUSCULOSKELETAL: Degenerative disease. Mastectomy with reconstruction as on prior.     CONCLUSION:  1.  Evidence for pulmonary artery hypertension. No pulmonary emboli, aortic aneurysm or dissection. Atherosclerotic plaque including coronary artery disease.  2.  Moderate-sized hiatal hernia.  3.  Hepatic steatosis.  4.  Remainder stable.    8/21/19 PFT's  FEV1/FVC 65 FEV1 72 FVC 87  Negative BD response  TLC 93  RV/  DLCO ralph 81%  FVL has scooping of expiratory limb consistent with obstruction    Mild obstruction  Normal lung volumes  Normal diffusion capacity    8/13/19 Echo (care everywhere)  Unable to assess diastolic function  Interpretation Summary  1. Global and regional left ventricular function is normal with an EF of 55-  60%.  2. The right ventricle is normal size. Global right ventricular function is mildly reduced.  3. No significant valvular disease.  4. Unable to assess pulmonary artery pressure.  5. IVC diameter <2.1 cm collapsing >50% with sniff suggests a normal RA  pressure of 3 mmHg.    There is no prior study for direct comparison.

## 2021-06-19 NOTE — LETTER
Letter by Reina Morillo MD at      Author: Reina Morillo MD Service: -- Author Type: --    Filed:  Encounter Date: 12/6/2019 Status: Signed         Loida Stockton MD  [address] [facility] [address]                                  December 6, 2019    Patient: Randi Damon   MR Number: [id number]   YOB: 1955   Date of Visit: 12/6/2019     Dear Dr. Mahin MD:    Thank you for referring Randi Damon to me for evaluation. Below are the relevant portions of my assessment and plan of care.    If you have questions, please do not hesitate to call me. I look forward to following Randi along with you.    Sincerely,        Reina Morillo MD          CC  No Recipients  Reina Morillo MD  12/6/2019  4:26 PM  Sign when Signing Visit  Pulmonary Clinic Outpatient Consultation    Assessment and Plan:   This is a 64 y F with depression/anxiety, alcohol abuse, severe sleep apnea, and possible mild OHS not on CPAP, morbid obesity (BMI 42), 44+ pack year tobacco history in remission, COPD with mild osbtruction, GERD and hiatal hernia, chronic pain on opiods, hypothyroid after tx for Graves, pheochromocytoma resected ~2 years ago, who presents for follow-up of COPD and exertional dyspnea.     Also following w Dr. Edwards at the [facility], had RHC consistent with pulmonary venuous HTN, started on lasix.       PLAN by Issue:    1. COPD, GOLD A-B, mild obstruction  Has had issues with cost of inhalers. Currently on symbicort. No clear indication for inhaled steroid in her case, we changed to anoro.     Continue anoro and prn albuterol. She will let me know if she wants to change inhalers/pharmacies in the coming months with anticipated insurance changes    Suspect her COPD is a small component of her chronic dyspnea    She has a 44+ pack year smoking history, quit 20 years ago.     She is up to date on vaccinations    2. Pulmonary Nodule  4 mm, LLL. Stable since 4/2017, >2  "years    No further scans indicated    She is not a candidate for lung cancer screening scans    3. Pulmonary Hypertension  Seen at the [facility] clinic and s/p RHC with mean PAP 31, and elevated wedge pressure consistent with pulmonary venuous HTN, likely in the setting of diastolic dysfunction, and likely component of untreated KYAW.     She was referred to sleep/PSG - seeing them 12/11    She was prescribed lasix, and cardiac rehab there    4. Exertional Dyspnea  Due to COPD, morbid obesity, deconditioning, HFpEF, PHTN    Adjusting inhaler as above    Has many chronic issues with depression, chronic pain, ideally needs aggressive weight loss plan.      I will see her back in 1 year. She will call me if COPD symptoms worsen/require closer follow-up.      Reina Morillo MD  Pulmonary and Critical Care Medicine  [facility]  Office: [phone]  Pager: [phone]    ----------------------    Reason for Consult: COPD    Interval HPI:   Missed multiple appts over the last few months.  In the interim, had RHC at the [facility] consistent with pulmonary venuous HTN. On lasix, and BP medications. Started cardiac rehab there, seeing sleep there on 12/11 for severe KYAW    Picked up anoro ~10 days ago, replaced symbicort. Not sure this is helping  Has not required albuterol  Breathing is \"terrible,\" but notes this is the same as its always been    --------------  This is a 64 y F with depression/anxiety, alcohol abuse, severe sleep apnea, not on CPAP, morbid obesity (BMI 42), 44+ pack year tobacco history in remission, COPD, GERD and hiatal hernia, chronic pain on opiods, hypothyroid after tx for Graves, pheochromocytoma resected ~2 years ago, who presents for an evaluation of COPD and exertional dyspnea.     She was very emotionally labile, tangential, and difficult to obtain a history from during our visit.     She reports she has had long standing ANNA. It has gotten worse over time, which she correlates with a " 60+ lbs weight gain over the last 2 years. She has a chronic cough productive of clear phlegm. She notes significant depressive symptoms but no SI, feels no energy or motivation. She reports a recent relapse of alcohol abuse, but tells me she has been sober for 1 month however reported at [facility] visit 1 week ago she was drinking 2 bottles of wine daily.    She has severe KYAW per PS[facility] but has not been using CPAP. She was referred to sleep again and has an appointment next month.    For her COPD she hs been on Symbicort intermittently due to cost, she just picked up another supply and has been back on for ~1 month. She is uncertain if it helps. She ran out of albuterol last month. CAT 31 today. Not certain of her last exacerbation. She quit smoking ~20 years ago.    She recently saw Dr. Edwards at [facility] for a pulmonary HTN evaluation. She had an enlarged PA on a CT PE. The echo done there was unable to estimate PA pressure or diastolic function. It showed a mild reduction in RV function and was otherwise normal. She was referred for a sleep study and to hepatology for a history of possible hereditary hemochromatosis.     ROS:  A 12-system review was obtained and was negative with the exception of the symptoms endorsed in the history of present illness.    PMH:  Past Medical History:   Diagnosis Date   ? Anxiety    ? Breast cancer (H) 1994   ? Chronic pain syndrome    ? COPD (chronic obstructive pulmonary disease) (H)    ? Depression    ? Esophageal reflux    ? Graves disease    ? Hemochromatosis    ? Hx antineoplastic chemotherapy    ? Hx of radiation therapy    ? Hypertension    ? Impaired fasting glucose    ? Pheochromocytoma    ? Psoriasis    ? Psoriatic arthropathy (H)    ? Restless leg syndrome    ? Right rotator cuff tear    ? Sleep apnea 2017    CPAP   ? Spinal stenosis    ? Urinary incontinence    ? Vitamin B12 deficiency      Social Hx:  Tobacco: 2-3 PPD for 22 years, quit >20 years ago  Prior drug abuse,  EtOH dependency, had been sober 20 years now relapsed, nothing in the last 1 month  Disabled, then retired, worked in   Currently lives in [place] with   1 dog, 1 cat   No travel in 2 years    Physical Exam:  /76   Pulse 85   Resp 19   Wt (!) 262 lb (118.8 kg)   SpO2 95% Comment: RA  BMI 42.29 kg/m     Gen: Alert, oriented, no distress  HEENT: nasal turbinates are unremarkable, no oropharyngeal lesions, no cervical or supraclavicular lymphadenopathy  CV: RRR, no M/G/R  Resp: Decreased, CTAB, no focal crackles or wheezes  Abd: obese, soft, nontender, no palpable organomegaly  Skin: no apparent rashes  Ext: no cyanosis, clubbing, no edema  Neuro: alert, nonfocal    Labs:  Reviewed  ABG 7.40/43    Imaging studies:  Personally reviewed:    6/24/19 CT PE:  ANGIOGRAM CHEST: Atherosclerotic plaque including coronary artery calcification. No aortic aneurysm or dissection. Mildly prominent right and left main pulmonary arteries suggestive of pulmonary artery hypertension. No pulmonary emboli.     RV/LV RATIO: N/A     LUNGS AND PLEURA: Stable benign 4 mm pulmonary nodule pleural-based on image #77 at the left lung base. Mild to moderate scattered fibroatelectasis. Diffuse air trapping often associated with small vessel or small airways disease. No effusion.     MEDIASTINUM: Moderate sized hiatal hernia. No mass or adenopathy.     LIMITED UPPER ABDOMEN: Hepatic steatosis. Left adrenal mass on prior not imaged.     MUSCULOSKELETAL: Degenerative disease. Mastectomy with reconstruction as on prior.     CONCLUSION:  1.  Evidence for pulmonary artery hypertension. No pulmonary emboli, aortic aneurysm or dissection. Atherosclerotic plaque including coronary artery disease.  2.  Moderate-sized hiatal hernia.  3.  Hepatic steatosis.  4.  Remainder stable.    8/21/19 PFT's  FEV1/FVC 65 FEV1 72 FVC 87  Negative BD response  TLC 93  RV/  DLCO ralph 81%  FVL has scooping of expiratory limb  consistent with obstruction    Mild obstruction  Normal lung volumes  Normal diffusion capacity    Right Heart Cath (from the [facility])  PA  Systolic:41 mmHg  Diasotlic: 24 mmHg  Mean: 31 mmHg  HR: 88 bpm  PA Sat: 70.8%    PCW  A-wave: 18 mmHg  V-wave: 17 mmHg  Mean: 16 mmHg  HR: 91 bpm    Cardiac Output  CO Jeannie: 5.72 L/min  CI Jeannie: 2.56 L/min/m2  CO TD: Not performed  CI TD: Not performed     8/13/19 Echo (care everywhere)  Unable to assess diastolic function  Interpretation Summary  1. Global and regional left ventricular function is normal with an EF of 55-  60%.  2. The right ventricle is normal size. Global right ventricular function is mildly reduced.  3. No significant valvular disease.  4. Unable to assess pulmonary artery pressure.  5. IVC diameter <2.1 cm collapsing >50% with sniff suggests a normal RA  pressure of 3 mmHg.    There is no prior study for direct comparison.

## 2021-06-19 NOTE — PROGRESS NOTES
Patient presents to the clinic today for a follow up with Anne-Marie Little CNP regarding shoulder and neck pain.

## 2021-06-20 NOTE — LETTER
Letter by Cari Denise CHW at      Author: Cari Denise CHW Service: -- Author Type: --    Filed:  Encounter Date: 10/2/2020 Status: (Other)       CARE COORDINATION  [facility]  [address] [place] [address]    October 5, 2020    Randi Damon  [address] [address]      Dear Randi,    I am a clinic community health worker who works with Loida Stockton MD at [facility]. I have been trying to reach you recently to introduce Clinic Care Coordination and to see if there was anything I could assist you with.  Below is a description of clinic care coordination and how I can further assist you.      The clinic care coordination team is made up of a registered nurse,  and community health worker who understand the health care system. The goal of clinic care coordination is to help you manage your health and improve access to the health care system in the most efficient manner. The team can assist you in meeting your health care goals by providing education, coordinating services, strengthening the communication among your providers and supporting you with any resource needs.    Please feel free to contact me at [phone] with any questions or concerns. We are focused on providing you with the highest-quality healthcare experience possible and that all starts with you.     Sincerely,     Aparna SANDERS  Community Health Worker  [phone]

## 2021-06-20 NOTE — PROGRESS NOTES
Pain Intensity:  3-The pain is fairly severe at the moment    Personal Care:  2-It is painful to look after myself and I am slow and careful    Lifting:  3-Pain prevents me from lifting heavy weights, but I can manage light to medicum weights if they are conveniently positioned.     Reading:  3-I can't read as much as I want b/c of moderate pain in my neck    Headaches:  3-I have moderate headache  Which come frequently    Concentration:  2-I have a fair degree of difficulty in concentrating when I want to    Work:  3-I cannot do my usual work     Driving:  3-I can't drive my car as long as I want b/c of moderate pain in my neck    Sleeping:  3-My sleep is moderately disturbed (2-3 hour sleepless)    Recreation:  3--I am able to engage in a few of my usual recreational activities b/c of pain in my neck.

## 2021-06-20 NOTE — LETTER
Letter by Reina Morillo MD at      Author: Reina Morillo MD Service: -- Author Type: --    Filed:  Encounter Date: 2/20/2020 Status: (Other)         Loida Stockton MD  [address] [facility] [address]                                  February 20, 2020    Patient: Randi Damon   MR Number: [id number]   YOB: 1955   Date of Visit: 2/20/2020     Dear Dr. Mahin MD:    Thank you for referring Randi Damon to me for evaluation. Below are the relevant portions of my assessment and plan of care.    If you have questions, please do not hesitate to call me. I look forward to following Randi along with you.    Sincerely,        Reina Morillo MD          CC  No Recipients  Reina Morillo MD  2/20/2020  5:10 PM  Sign when Signing Visit  Pulmonary Clinic Outpatient Consultation    Assessment and Plan:   This is a 64 y F with depression/anxiety, alcohol abuse, severe sleep apnea, and possible mild OHS not on CPAP, morbid obesity (BMI 42), 44+ pack year tobacco history in remission, COPD with mild osbtruction, GERD and hiatal hernia, chronic pain, hypothyroidism after tx for Graves, pheochromocytoma resected ~2 years ago, who presents for an urgent visit for congested cough, hoarseness, and poor breathing.     No fevers, weight is coming down, examines euvolemic, with wheezing on exam. Stopped her anoro as she wasn't sure it was helping, now breathing feels worse.     PLAN:    Rx course of steroids, doxy for AECOPD    2nd exacerbation in last 3 months - step up inhaler to Trelegy - given coupon for 1st month free to try. If she finds this helpful will continue on triple therapy.    Encouraged ongoing follow-up w cardiology, she appears euvolemic today, and following low salt diet.      She will call me in 2-3 weeks to check in. Will either start back on anoro or continue on Trelegy pending improvement with this - she has poor insight into her symptoms, would have low  "threshold to continue trelegy.    Reina Morillo MD  Pulmonary and Critical Care Medicine  [facility]  Office: [phone]  Pager: [phone]    ----------------------    Reason for Consult: Cough, congestion, inhaler not working    Interval HPI:   Difficult to get a clear history - exacerbation in Dec for which she took pred/doxy and improved. Currently with congested cough, hoarseness, \"terrible breathing,\" but states this is her baseline. I have certainly seen her better than this at our visits. Wheezing on exam. She is uncertain that anoro was helping, but stopped it Monday and now feels breathing is worse off of it. Using albuterol 2 times per day.    Continues to see [facility] cards for diastolic CHF, titrating lasix, she is trying to adhere to low salt diet. Wt 277 - 272 lbs today.    Seeing sleep and trying to use CPAP    Overwhelmed and tearful    --------------  This is a 64 y F with depression/anxiety, alcohol abuse, severe sleep apnea, not on CPAP, morbid obesity (BMI 42), 44+ pack year tobacco history in remission, COPD, GERD and hiatal hernia, chronic pain on opiods, hypothyroid after tx for Graves, pheochromocytoma resected ~2 years ago, who presents for an evaluation of COPD and exertional dyspnea.     She was very emotionally labile, tangential, and difficult to obtain a history from during our visit.     She reports she has had long standing ANNA. It has gotten worse over time, which she correlates with a 60+ lbs weight gain over the last 2 years. She has a chronic cough productive of clear phlegm. She notes significant depressive symptoms but no SI, feels no energy or motivation. She reports a recent relapse of alcohol abuse, but tells me she has been sober for 1 month however reported at [facility] visit 1 week ago she was drinking 2 bottles of wine daily.    She has severe KYAW per [facility] but has not been using CPAP. She was referred to sleep again and has an appointment next month.    For " her COPD she hs been on Symbicort intermittently due to cost, she just picked up another supply and has been back on for ~1 month. She is uncertain if it helps. She ran out of albuterol last month. CAT 31 today. Not certain of her last exacerbation. She quit smoking ~20 years ago.    She recently saw Dr. Edwards at [facility] for a pulmonary HTN evaluation. She had an enlarged PA on a CT PE. The echo done there was unable to estimate PA pressure or diastolic function. It showed a mild reduction in RV function and was otherwise normal. She was referred for a sleep study and to hepatology for a history of possible hereditary hemochromatosis.     ROS:  A 12-system review was obtained and was negative with the exception of the symptoms endorsed in the history of present illness.    PMH:  Past Medical History:   Diagnosis Date   ? Anxiety    ? Breast cancer (H) 1994   ? Chronic pain syndrome    ? COPD (chronic obstructive pulmonary disease) (H)    ? Depression    ? Esophageal reflux    ? Graves disease    ? Hemochromatosis    ? Hx antineoplastic chemotherapy    ? Hx of radiation therapy    ? Hypertension    ? Impaired fasting glucose    ? Pheochromocytoma    ? Psoriasis    ? Psoriatic arthropathy (H)    ? Restless leg syndrome    ? Right rotator cuff tear    ? Sleep apnea 2017    CPAP   ? Spinal stenosis    ? Urinary incontinence    ? Vitamin B12 deficiency      Social Hx:  Tobacco: 2-3 PPD for 22 years, quit >20 years ago  Prior drug abuse, EtOH dependency, had been sober 20 years now relapsed, nothing in the last 1 month  Disabled, then retired, worked in   Currently lives in [place] with   1 dog, 1 cat   No travel in 2 years    Physical Exam:  /62   Pulse (!) 106   Resp 24   Wt (!) 272 lb 3.2 oz (123.5 kg)   SpO2 96% Comment: RA  BMI 43.93 kg/m     Gen: Alert, oriented, hacking cough, congested sounding  HEENT: nasal turbinates are unremarkable, no oropharyngeal lesions, no cervical or  supraclavicular lymphadenopathy  CV: RRR, no M/G/R  Resp: Decreased, diffusely rhoncherous with expiratory wheezes  Abd: obese, soft, nontender, no palpable organomegaly  Skin: no apparent rashes  Ext: no cyanosis, clubbing, trace bl ankle edema  Neuro: alert, nonfocal    Labs:  Reviewed  ABG 7.40/43    Imaging studies:  Personally reviewed:    10/16/19 CXR:  No significant change. Lungs are mildly hyperinflated consistent with COPD but clear, no new pneumonia. Heart size remains normal. Prominent central pulmonary arteries again evident suggestive of pulmonary artery hypertension.    6/24/19 CT PE:  ANGIOGRAM CHEST: Atherosclerotic plaque including coronary artery calcification. No aortic aneurysm or dissection. Mildly prominent right and left main pulmonary arteries suggestive of pulmonary artery hypertension. No pulmonary emboli.     RV/LV RATIO: N/A     LUNGS AND PLEURA: Stable benign 4 mm pulmonary nodule pleural-based on image #77 at the left lung base. Mild to moderate scattered fibroatelectasis. Diffuse air trapping often associated with small vessel or small airways disease. No effusion.     MEDIASTINUM: Moderate sized hiatal hernia. No mass or adenopathy.     LIMITED UPPER ABDOMEN: Hepatic steatosis. Left adrenal mass on prior not imaged.     MUSCULOSKELETAL: Degenerative disease. Mastectomy with reconstruction as on prior.     CONCLUSION:  1.  Evidence for pulmonary artery hypertension. No pulmonary emboli, aortic aneurysm or dissection. Atherosclerotic plaque including coronary artery disease.  2.  Moderate-sized hiatal hernia.  3.  Hepatic steatosis.  4.  Remainder stable.    8/21/19 PFT's  FEV1/FVC 65 FEV1 72 FVC 87  Negative BD response  TLC 93  RV/  DLCO ralph 81%  FVL has scooping of expiratory limb consistent with obstruction    Mild obstruction  Normal lung volumes  Normal diffusion capacity    Right Heart Cath (from the [facility])  PA  Systolic:41 mmHg  Diasotlic: 24 mmHg  Mean: 31  mmHg  HR: 88 bpm  PA Sat: 70.8%    PCW  A-wave: 18 mmHg  V-wave: 17 mmHg  Mean: 16 mmHg  HR: 91 bpm    Cardiac Output  CO Jeannie: 5.72 L/min  CI Jeannie: 2.56 L/min/m2  CO TD: Not performed  CI TD: Not performed     8/13/19 Echo (care everywhere)  Unable to assess diastolic function  Interpretation Summary  1. Global and regional left ventricular function is normal with an EF of 55-  60%.  2. The right ventricle is normal size. Global right ventricular function is mildly reduced.  3. No significant valvular disease.  4. Unable to assess pulmonary artery pressure.  5. IVC diameter <2.1 cm collapsing >50% with sniff suggests a normal RA  pressure of 3 mmHg.    There is no prior study for direct comparison.
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
## 2021-06-21 NOTE — LETTER
Letter by Myhre, David J, RN at      Author: Myhre, David J, RN Service: -- Author Type: --    Filed:  Encounter Date: 12/29/2020 Status: (Other)       CARE COORDINATION  [facility]  [address] [place] [address]      December 29, 2020    Randi MorganRonyEvin  [address] [facility] [address]      Dear Winnie,    I am a clinic care coordinator who works with Loida Stockton MD at the [facility]. I wanted to thank you for spending the time to talk with me.  Below is a description of clinic care coordination and how I can further assist you.      The clinic care coordination team is made up of a registered nurse,  and community health worker who understand the health care system. The goal of clinic care coordination is to help you manage your health and improve access to the health care system in the most efficient manner. The team can assist you in meeting your health care goals by providing education, coordinating services, strengthening the communication among your providers and supporting you with any resource needs.    Please feel free to contact the Community Health WorkerAparna at [phone] with any questions or concerns. We are focused on providing you with the highest-quality healthcare experience possible and that all starts with you.     Sincerely,     David Myhre, RN  CCC RN    Enclosed: I have enclosed a copy of the Care Plan. This has helpful information and goals that we have talked about. Please keep this in an easy to access place to use as needed.

## 2021-06-21 NOTE — LETTER
Letter by Rosibel Teran MD at      Author: Rosibel Teran MD Service: -- Author Type: --    Filed:  Encounter Date: 3/23/2021 Status: (Other)         03/23/21     Randi Damon  [address] [facility] [address]          Dear Dr. Mahin Bryan has recommended you schedule a Medication Therapy Management (MTM) appointment. MTM is designed to help you get the most of out of your medications.     During an MTM appointment, you will meet with a specially trained pharmacist to review all of your medications, both prescription and over-the-counter. They will make sure your medications are the best choice for you, safe, and working well. The MTM pharmacist works together with you and your doctor to help you understand your medications, solve any problems related to your medications, and help you meet your health goals.     To make an appointment, please call the MTM scheduling line at [phone] and toll free [phone].      We look forward to hearing from you!    Sincerely,       Waylon Catherine, PharmD, BCACP  Medication Management (MTM) Pharmacist  [facility]

## 2021-06-21 NOTE — PROGRESS NOTES
November 15th, 2018  Randi Damon  [address] [facility] [address]      Dear Randi:    We missed you! You recently late cancelled (less than 24 hour) your 2nd scheduled diagnostic assessment with ASHKAN Brandon, CLEMENTINE on November 15th, 2018.   Your next appointment is scheduled for December 5th, 2018, if you late cancel (less than 24 hour notice) or no show for this appointment you will not be able to reschedule.       If you have questions please call the clinic at [phone].    We are committed to partnering with you to meet your health care needs and hope that we can continue to serve you.        Sincerely,  Clinic Director

## 2021-06-21 NOTE — LETTER
Letter by Myhre, David J, RN at      Author: Myhre, David J, RN Service: -- Author Type: --    Filed:  Encounter Date: 12/29/2020 Status: (Other)       Care Plan  About Me:    Patient Name:  Randi Damon    YOB: 1955  Age:         65 y.o.   [facility] MRN:    [id number] Telephone Information:  Home Phone [phone]   Mobile [phone]       Address:  [address] [place] [address] Email address:  [email or web address]      Emergency Contact(s)  Extended Emergency Contact Information      Name: Justin Cleary  Address:       [address] [place] [address]  Home Phone Number: [phone]  Relation: Spouse      [place] [address]  Relation: Sister-In-Law          Primary language:  English     needed? No   [facility]:  [phone] op. 1  Other communication barriers: None  Preferred Method of Communication:  La  Current living arrangement: I live in a private home with family  Mobility Status/ Medical Equipment: Independent w/Device    Health Maintenance  Health Maintenance Reviewed: Reviewed. Patient will discuss with PCP at next Office Visit.   My Access Plan  Medical Emergency 911   Primary Clinic Line Loida Stockton MD - [phone]   24 Hour Appointment Line [phone] or  [phone]) (toll-free)   24 Hour Nurse Line [phone] (toll-free)   Preferred Urgent Care [facility], [phone]   [facility]  [phone]   Preferred Pharmacy [id number] [place], [place] - [address]     Behavioral Health Crisis Line The National Suicide Prevention Lifeline at 1-498.954.1747 or 911             My Care Team Members  Patient Care Team       Relationship Specialty Notifications Start End    Loida Stockton MD PCP - General   12/4/07     Phone: [phone] Fax: [phone] [address] [facility]  [place] [address]    Loida Stockton MD Assigned PCP   9/21/20     Phone: [phone] Fax: [phone]         [address] [facility] [address]    Kimberlyn Ojeda, PharmD Pharmacist Pharmacist  9/30/20     Phone: [phone] Fax: [phone]         [facility] [address] [facility] [address]    Lani Tirado NP Assigned Behavioral Health Provider   10/23/20     Phone: [phone] Fax: [phone]         [address] [address]    Reina Morillo MD Assigned Pulmonology Provider   10/23/20     Phone: [phone] Fax: [phone]         [address] [facility] [address]    Myhre, David J, RN Lead Care Coordinator Primary Care - CC Admissions 12/29/20     Fax: [phone]         Cari Denise CHW Community Health Worker Primary Care - CC Admissions 12/29/20     Phone: [phone] Fax: [phone]                My Care Plans  Self Management and Treatment Plan  Goals and (Comments)  Goals        General    Mental Health Management (pt-stated)     Notes - Note created  12/29/2020  7:35 PM by Myhre, David J, RN    Goal Statement: I would like to have my depression under better control in the next 6 months.   Date Goal set: 12/29/20  Barriers: Long history of depression  Strengths: Strong advocate for herself  Date to Achieve By: 5/29/21  Patient expressed understanding of goal: Yes  Action steps to achieve this goal:  1. I will continue to attend all scheduled appointments with Dr. Dubois and follow up on his recommendations.   2. I will take my antidepressant ordered by Dr. Dubois as prescribed.   3. I will continue to attend all scheduled appointments with my therapist, Corine.  4. I will consider returning to swimming at the [employer] as I know this helps me both physically and mentally.   5. I will continue to participate in activities that I enjoy and keeps me busy at home.   6. I will report progress towards this goals at scheduled outreach  telephone calls from the CCC team.         Transportation (pt-stated)     Notes - Note edited  12/29/2020  7:23 PM by Myhre, David J, RN    Goal Statement- I would like to apply for [employer] within the next 6 months.  Date Goal set: 12/29/20  Barriers: Limited transportation options  Strengths: Strong advocate for herself.   Date to Achieve By: 5/29/21  Patient expressed understanding of goal: Yes  Action steps to achieve this goal  1.  The [facility] R[facility] will mail me the application and information for [employer].  2.  I will review the application and complete the Certification Questionnaire section. I will bring the application to my appointment with Dr. Stockton on 1/6/21.  3.  Dr. Stoctkon will complete the Professional Verification Form section of the application and will coordinate mailing both sections in.  4. I will notify the Community Health Worker once I find out if I am approved or denied [employer].                     Advance Care Plans/Directives Type:        My Medical and Care Information  Problem List   Patient Active Problem List   Diagnosis   ? Psoriatic Arthropathy   ? Osteopenia   ? Restless Legs Syndrome   ? Vitamin B12 deficiency   ? Depression   ? Postablative hypothyroidism   ? Vitamin D Deficiency   ? Hemochromatosis   ? Impaired Fasting Glucose   ? History of breast cancer   ? Morbid Obesity   ? Hypertension due to endocrine disorder   ? Esophageal Reflux   ? Psoriasis   ? Spinal Stenosis   ? Benign Adenomatous Polyp Of The Large Intestine   ? Joint Pain, Localized In The Hip   ? Anxiety state, unspecified   ? Sacroiliitis (H)   ? Neck pain   ? COPD (chronic obstructive pulmonary disease) (H)   ? Sleep apnea, obstructive   ? Anxiety   ? Hypoglycemia   ? Drug-induced constipation   ? Hereditary hemochromatosis (H)   ? Malignant neoplasm of left female breast, unspecified estrogen receptor status, unspecified site of breast (H)   ? Chronic intractable pain   ? Pulmonary  hypertension (H)   ? Serotonin syndrome   ? Type 2 diabetes mellitus without complication, without long-term current use of insulin (H)   ? Severe major depression (H)      Current Medications and Allergies:  See printed Medication Report.    Care Coordination Start Date: 12/29/2020   Frequency of Care Coordination:     Form Last Updated: 12/29/2020
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
## 2021-06-22 NOTE — PROGRESS NOTES
[facility] Follow Up Note-Randi arrives about 5 minutes late.  She is very frazzled with elevated blood pressure    Assessment/Plan:  1. Psoriasis  Significant improvement in psoriasis with topical application of vitamin D.  Recommendation: Although a significant improvement is noted, I still think she would benefit from dermatologic input.  Will refer to a new dermatologist for additional opinion  - Vitamin D, Total (25-Hydroxy)  - HM2(CBC w/o Differential)  - Ambulatory referral to Dermatology    2. Vitamin D deficiency  She is taking oral vitamin D as well as applying it topically.  Will check lab  - Vitamin D, Total (25-Hydroxy)    3. Postablative hypothyroidism  We will update labs  - Thyroid Cascade  - levothyroxine (SYNTHROID, LEVOTHROID) 150 MCG tablet; TAKE 1 TAB BY MOUTH ALTERNATING WITH 175 MCG  Dispense: 90 tablet; Refill: 3  - levothyroxine (SYNTHROID, LEVOTHROID) 175 MCG tablet; TAKE 1 TAB BY MOUTH EVERY OTHER DAY ALTERNATING WITH  MCG DOSE  Dispense: 90 tablet; Refill: 3    4. Vitamin B12 deficiency  Status post bariatric surgery.  We will update labs  - Vitamin B12    5. Chronic obstructive pulmonary disease, unspecified COPD type (H)  stable    6. Hemochromatosis  Family history of hemochromatosis.  We will update labs.  Of note, historically, she had needed phlebotomy.  In the last 10 years or so, this is not been the case.  - HM2(CBC w/o Differential)  - Comprehensive Metabolic Panel  - Ferritin  - Erythrocyte Sedimentation Rate    7. Psoriatic Arthropathy  Consultation with rheumatology would be helpful.  Significant part of her chronic pain comes from her arthritis symptoms.  Additionally, she is working with the pain clinic and is on chronic opioids.  Would like to see this decreased.  - Ambulatory referral to Rheumatology    8. Morbid obesity (H)  Encourage regular exercise and healthful eating        Loida Stockton MD    Chief Complaint:  Chief Complaint   Patient  presents with     Follow-up     Immunizations       History of Present Illness:  Randi is a 63 y.o. female who is here today for a routine follow-up.  Of note, she arrives about 5 minutes late and is very frazzled.  She is talking incessantly about her 's medical problems and needs to be redirected with regard to her own.    She has psoriasis and psoriatic arthritis.  She has had significant improvement by cracking open a vitamin D capsule and putting oil directly on her skin.  She shows significant improvement in the character of her skin.  She would not like to return to her current dermatologist that she does not find that he is very helpful.  She would like to seek a second opinion if possible.  Additionally, she believes the arthritis associated with her psoriasis contributes significantly to her chronic pain problem.  She is requesting referral to rheumatology.  I have discussed this with her in the past.  She is finally agreeable.    She has chronic pain for variety of reasons.  She has chronic foot pain due to multiple foot surgeries.  Also, she has significant degenerative joint disease as well as psoriatic arthritis.  She is getting steroid injections to the appropriate areas.  She is also on chronic opioid therapy-managed by the pain clinic.    She has psychiatric disease as well.  She is under the care of Dr. claros.  He is managing her medications.  She states that she is experiencing more anxiety recently.    Her other medical comorbidities include COPD/reactive airways disease, intermittent blood pressure elevation-often related to mental status.  She is status post resection of a lesion on the adrenal gland felt to be metabolically active and consistent with a pheochromocytoma.  However, resection of this lesion did not dramatically improve heart rate or blood pressure or mood.    She reports today also that she will be needing a shoulder surgery in the up and coming future    Review of  Systems:  A comprehensive review of systems was performed and was otherwise negative    PFSH:  Social History: She is .  She does not smoke cigarettes.  She has no children.  Social History     Tobacco Use   Smoking Status Former Smoker     Last attempt to quit: 8/1/2003     Years since quitting: 15.3   Smokeless Tobacco Never Used       Past History: She has had multiple endocrine issues.  Past Medical History:   Diagnosis Date     Anxiety      Breast cancer (H) 1994     Chronic pain syndrome      COPD (chronic obstructive pulmonary disease) (H)      Depression      Esophageal reflux      Graves disease      Hemochromatosis      Hx antineoplastic chemotherapy      Hx of radiation therapy      Hypertension      Impaired fasting glucose      Pheochromocytoma      Psoriatic arthropathy (H)      Restless leg syndrome      Right rotator cuff tear      Sleep apnea 2017    CPAP     Spinal stenosis      Urinary incontinence      Vitamin B12 deficiency        Current Outpatient Medications   Medication Sig Dispense Refill     albuterol (PROAIR HFA;PROVENTIL HFA;VENTOLIN HFA) 90 mcg/actuation inhaler Inhale 2 puffs every 6 (six) hours as needed for wheezing.       albuterol (PROVENTIL) 2.5 mg /3 mL (0.083 %) nebulizer solution Take 3 mL (2.5 mg total) by nebulization every 6 (six) hours as needed for wheezing. 75 mL 12     alclomethasone (ACLOVATE) 0.05 % cream        betamethasone valerate (VALISONE) 0.1 % ointment Apply hs 45 g 1     clobetasol (TEMOVATE) 0.05 % cream        cyanocobalamin, vitamin B-12, 2,500 mcg Subl Place under the tongue 2 (two) times a week.       diclofenac sodium (VOLTAREN) 1 % Gel Apply 2 g topically 2 (two) times a day as needed (pain on shoulder and ankle). 100 g 12     DULoxetine (CYMBALTA) 60 MG capsule Take 60 mg by mouth at bedtime.  1     [START ON 12/20/2018] ergocalciferol (VITAMIN D2) 50,000 unit capsule Take 1 capsule (50,000 Units total) by mouth 2 (two) times a week. 24 capsule  3     folic acid (FOLVITE) 1 MG tablet TAKE 1 TABLET (1 MG) BY ORAL ROUTE ONCE DAILY 90 tablet 3     levothyroxine (SYNTHROID, LEVOTHROID) 150 MCG tablet TAKE 1 TAB BY MOUTH ALTERNATING WITH 175 MCG 90 tablet 3     levothyroxine (SYNTHROID, LEVOTHROID) 175 MCG tablet TAKE 1 TAB BY MOUTH EVERY OTHER DAY ALTERNATING WITH  MCG DOSE 90 tablet 3     LORazepam (ATIVAN) 1 MG tablet Take 1 mg by mouth 2 (two) times a day as needed.   0     naloxone (NARCAN) 4 mg/actuation nasal spray 1 spray (4 mg dose) into one nostril for opioid reversal. Call 911. May repeat if no response in 3 minutes. 1 Box 0     omeprazole (PRILOSEC) 20 MG capsule TAKE 1 CAPSULE BY MOUTH 2 TIMES A  capsule 3     [START ON 12/25/2018] oxyCODONE (ROXICODONE) 5 MG immediate release tablet Take 1 tablet (5 mg total) by mouth every 4 (four) hours as needed for pain. 168 tablet 0     polyethylene glycol (MIRALAX) 17 gram packet Take 1 packet (17 g total) by mouth daily. 30 packet 5     pramipexole (MIRAPEX) 1 MG tablet Take 0.5-1 tablets (0.5-1 mg total) by mouth 2 (two) times a day. 60 tablet 5     temazepam (RESTORIL) 7.5 MG capsule        triamcinolone (KENALOG) 0.1 % cream Apply a thin layer to affected area once daily 15 g 0     No current facility-administered medications for this visit.        Family History: Nothing new.    Physical Exam:  General Appearance:   She appears at baseline.  She is very talkative.  It is hard to keep her on point today  Vitals:    12/19/18 1245 12/19/18 1301   BP: 178/88 136/86   Patient Site: Right Arm    Patient Position: Sitting    Cuff Size: Adult Large    Pulse: (!) 102    SpO2: 96%    Weight: (!) 244 lb 9.6 oz (110.9 kg)      Wt Readings from Last 3 Encounters:   12/19/18 (!) 244 lb 9.6 oz (110.9 kg)   11/27/18 (!) 240 lb (108.9 kg)   10/16/18 (!) 230 lb (104.3 kg)     Body mass index is 39.48 kg/m .        Data Review:    Analysis and Summary of Old Records (2): Reviewed pain clinic notes as well as  orthopedic surgery notes    Records Requested (1):       Other History Summarized (from other people in the room) (2):     Radiology Tests Summarized (XRAY/CT/MRI/DXA) (1): Ordered mammogram and bone density    Labs Reviewed (1): Ordered labs    Medicine Tests Reviewed (EKG/ECHO/COLONOSCOPY/EGD) (1):     Independent Review of EKG or X-RAY (2):

## 2021-06-23 ENCOUNTER — VIRTUAL VISIT (OUTPATIENT)
Dept: PSYCHOLOGY | Facility: CLINIC | Age: 66
End: 2021-06-23
Payer: MEDICARE

## 2021-06-23 DIAGNOSIS — F33.1 MAJOR DEPRESSIVE DISORDER, RECURRENT EPISODE, MODERATE WITH ANXIOUS DISTRESS (H): Primary | ICD-10-CM

## 2021-06-23 DIAGNOSIS — F41.1 GAD (GENERALIZED ANXIETY DISORDER): ICD-10-CM

## 2021-06-23 PROCEDURE — 90834 PSYTX W PT 45 MINUTES: CPT | Mod: 95 | Performed by: SOCIAL WORKER

## 2021-06-23 ASSESSMENT — PATIENT HEALTH QUESTIONNAIRE - PHQ9
10. IF YOU CHECKED OFF ANY PROBLEMS, HOW DIFFICULT HAVE THESE PROBLEMS MADE IT FOR YOU TO DO YOUR WORK, TAKE CARE OF THINGS AT HOME, OR GET ALONG WITH OTHER PEOPLE: VERY DIFFICULT
SUM OF ALL RESPONSES TO PHQ QUESTIONS 1-9: 13
SUM OF ALL RESPONSES TO PHQ QUESTIONS 1-9: 13

## 2021-06-23 NOTE — TELEPHONE ENCOUNTER
Pt called to say she's sorry she cancelled her appt today. She didn't have transportation as her  was delayed at work. States she's working on getting [employer]. Wants Anne-Marie to know her shoulder surgery is scheduled 02/27.

## 2021-06-23 NOTE — TELEPHONE ENCOUNTER
Received call from pharmacy for clarification of recent order. Old start dates were on order so new prescription needed. New order sent to Dr Dubois.    Routed to Dr Dubois.

## 2021-06-23 NOTE — PROGRESS NOTES
Outpatient Mental Health Treatment Plan    Name:  Randi Damon  :  1955  MRN:  [id number]    Treatment Plan:  Initial Treatment Plan  Intake/initial treatment plan date:  2019  Benefit and risks and alternatives have been discussed: Yes  Is this treatment appropriate with minimal intrusion/restrictions: Yes  Estimated duration of treatment:  Approximately 20+ sessions.  Anticipated frequency of services:  Every 1 weeks  Necessity for frequency: This frequency is needed to establish therapeutic goals and for continuity of care in order to monitor progress.  Necessity for treatment: To address cognitive, behavioral, and/or emotional barriers in order to work toward goals and to improve quality of life.     Plan:                 ? Depression                 Goal:    Decrease average depression level from 23 to 10 or below.              Strategies:       ?[x] Decrease social isolation                                      [x] Increase involvement in meaningful activities                                      ?[x] Discuss sleep hygiene                                      ?[x] Explore thoughts and expectations about self and others                                      ?[x] Process grief (loss of significant person, independence, role, etc.)                                      ?[x] Assess for suicide risk                                      ?[x] Implement physical activity routine (with physician approval)                                      [x] Consider introduction of bibliotherapy and/or videos                                      [x] Emotion regulation (DBT skills)                                      X- self-care strategies                                      X- communication strategies                                      X- Cognitive Restructuring                                      X- Maintain medication compliance                                       X- regular attendance with  Psychiatrist                                        X- grief/loss                                                              ?  ?Degree to which this is a problem: 4  Degree to which goal is met: 0  Date of Review:                                                                                 ?              ? Anxiety/PTSD         Goal:    Decrease average anxiety level from 12 to 6 or below.              Strategies:       ? [x]Learn and practice relaxation techniques and other coping strategies (e.g., thought stopping, reframing, meditation)                                      ? [x] Increase involvement in meaningful activities                                      ? [x] Discuss sleep hygiene                                      ? [x] Explore thoughts and expectations about self and others                                      ? [x] Identify and monitor triggers for panic/anxiety symptoms                                      ? [x] Implement physical activity routine (with physician approval)                                      ? [x] Consider introduction of bibliotherapy and/or videos                                      ? [x] Continue compliance with medical treatment plan (or explore barriers)                                                  X- Anger management strategies                                      X- stress management skills                                         X- impulse control skills                                            X- consider EMDR                                   Degree to which this is a problem: 4  Degree to which goal is met: 0  Date of Review:      Chronic pain  Goal:    ? Decrease average pain level from 8/10  to 5/10.                            Strategies:       ? [x] Explore thoughts and expectations about self and others                                                                  [x] Explore emotional reactions to illness/injury                                       ?  [x] Learn and practice relaxation techniques and other coping strategies                                             [x] Implement physical activity routine (with physician approval)                                      ? [x] Engage in values clarification and goal-setting                                      ? [x] Consider introduction of bibliotherapy and/or videos                                      ? [x] Increase involvement in meaningful activities                                      ? [x] Discuss sleep hygiene                                      ? [x] Process grief (loss of significant person, independence, role   etc.)                                      ? [x] Assess for suicide risk                                      ?  Degree to which this is a problem: 4  Degree to which goal is met: 0  Date of Review:                                         Functional Impairment:  1=Not at all/Rarely  2=Some days  3=Most Days  4=Every Day    Personal : 4  Family : 4  Social : 4   Work/school : 4     Diagnosis:  Major Depression, recurrent, severe  Generalized Anxiety Disorder  Chronic PTSD  R/O Bipolar Disorder     WHODAS 2.0 12-item version 50%  H1= 30  H2= 30  H3= 30     Scores presented in qualifiers to represent level of disability.     NO problem - (none, absent, negligible,  ) - 0-4 %   MILD problem - (slight, low, ) - 5-24 %   MODERATE problem - (medium, fair,...) - 25-49 %   SEVERE problem - (high, extreme,  ) - 50-95 %   COMPLETE problem - (total, ) -  %           Clinical assessments and measures completed:. CHAVO-7, PHQ-9, Mood Questionnaire current, CAGE-AID and PANSI     Strengths:  Motivated, intelligent, insightful   Limitations:  Physical Limitation, lack of support system   Cultural Considerations: Pt is a 63 year old, marrried,  female w/ hx of trauma & chronic pain.      Persons responsible for this plan: Patient and Provider               Psychotherapist Signature           Patient  Signature:              Guardian Signature             Provider: Performed and documented by SUSAN Brandon   Date:  2/6/2019

## 2021-06-23 NOTE — TELEPHONE ENCOUNTER
Requested Prescriptions     Signed Prescriptions Disp Refills     oxyCODONE (ROXICODONE) 5 MG immediate release tablet 90 tablet 0     Sig: Take 1 tablet (5 mg total) by mouth every 4 (four) hours as needed for pain.     Authorizing Provider: HUMAIRA HANSEN   2/1-2/16    Thank you for the clarification.

## 2021-06-23 NOTE — TELEPHONE ENCOUNTER
If you see the note to pharmacy it states fill date 12/23 for use 12/25-1/22 I missed those dates when sent new prescription to Dr Dubois, so the pharmacy would not fill, thus the corrected prescription. So no they do not have a prescription on file. I have spoken with them regarding this matter.

## 2021-06-23 NOTE — PROGRESS NOTES
Mental Health Visit Note    1/24/2019    Start time: 1400    Stop Time: 1450   Session # 2    Session Type: Patient is presenting for Individual session    Randi Damon is a 63 y.o. female is being seen today for    Chief Complaint   Patient presents with     Anxiety/Depression   .     New symptoms or complaints: None    Functional Impairment:   Personal: 4  Family: 4  Work: 4  Social:4    Clinical assessment of mental status: Patient presented alert and oriented x3.  She was well-groomed.  Her attire was appropriate.  Patient was cooperative.  Patient motor actively was normal and eye contact appropriate.  Patients mood was anxious and affect congruent.  Patient s speech was rapid and language was normal.  Patient attention was brief and thought process normal.  Concentration was distracted.  Patient denied delusions or hallucinations. Patient denied suicidal or homicidal ideation.  Patient denied any long or short term memory problems. No evidence of impairment in judgment.    She has insight and fund of knowledge was adequate.        Suicidal/Homicidal Ideation present: None Reported This Session    Patient's impression of their current status: Pt reports symptoms of depression, anxiety and chronic pain that impact daily functioning.     Therapist impression of patients current state:  Reviewed and signed informed consent.  Began discussing short-term and long-term treatment plan goals.  Pt discussed stress with multiple health issues & family stressors.  It can be difficult to keep patient focused and on topic, has a lot of anxiety and stress.  Discussed the relationship between stress & health/chronic pain.     Type of psychotherapeutic technique provided: Insight oriented, Client centered, Solution-focused and CBT    Progress toward short term goals:Progress as expected, began discussing short-term goals    Review of long term goals: began discussing long-term goals    Diagnosis:   1. Severe recurrent  major depression without psychotic features (H)    2. Generalized anxiety disorder    3. Chronic post-traumatic stress disorder (PTSD)    4. Chronic pain syndrome        Plan and Follow up: Follow up with Mabel in 1-2 weeks  Continue to develop treatment plan  Follow up with all providers as scheduled      Discharge Criteria/Planning: Patient will continue with follow-up until therapy can be discontinued without return of signs and symptoms.    Shanice Merida 1/24/2019

## 2021-06-23 NOTE — TELEPHONE ENCOUNTER
New Appointment Needed  What is the reason for the visit:    follow up on test results  Provider Preference: PCP only  How soon do you need to be seen?: asap  Waitlist offered?: No  Okay to leave a detailed message:  Yes

## 2021-06-23 NOTE — PROGRESS NOTES
Mental Health Visit Note    2/6/2019   Start time: 1100    Stop Time: 1150   Session # 4    Session Type: Patient is presenting for an Individual session.    Randi Damon is a 63 y.o. female is being seen today for    Chief Complaint   Patient presents with     Anxiety   .     New symptoms or complaints: None    Functional Impairment:   Personal: 4  Family: 4  Work: 4  Social:4    Clinical assessment of mental status: Patient presented alert and oriented x3.  She was well-groomed.  Her attire was appropriate.  Patient was cooperative.  Patient motor actively was normal and eye contact appropriate.  Patients mood was anxious and affect congruent.  Patient s speech and language was normal.  Patient attention and thought process normal.  Concentration was appropriate.  Patient denied delusions or hallucinations. Patient denied suicidal or homicidal ideation.  Patient denied any long or short term memory problems. No evidence of impairment in judgment.   She has insight and fund of knowledge was adequate.        Suicidal/Homicidal Ideation present: None Reported This Session    Patient's impression of their current status: Pt expressed feeling very overwhelmed, anxious and depressed today.     Therapist impression of patients current state: Processed current psychosocial stressors, the impact of chronic pain, the lack of support from family & friends and anxiety related to her upcoming surgery.  Worked on treatment plant today, which we are almost done with but will need to review & sign next session.  Processed her ability to communicate with her . Encouraged her to bring him to a session if she would find that helpful.      Type of psychotherapeutic technique provided: Insight oriented, Client centered, Solution-focused and CBT    Progress toward short term goals:Progress as expected, coping skills, self-care, communication skills    Review of long term goals: reduce symptoms of depression, anxiety and  improve ability to cope with chronic pain    Diagnosis:   1. Severe recurrent major depression without psychotic features (H)    2. Generalized anxiety disorder    3. Chronic post-traumatic stress disorder (PTSD)    4. Chronic pain syndrome        Plan and Follow up: Follow up wthi john in 1-2 weeks  Complete treatment plan at next session  Follow up with all providers as scheduled      Discharge Criteria/Planning: Patient will continue with follow-up until therapy can be discontinued without return of signs and symptoms.    Shanice Merida 2/7/2019

## 2021-06-23 NOTE — TELEPHONE ENCOUNTER
Medication refill request: Oxycodone    Missed appointment on 1/18  Next appointment 2/15  Last appointment 11/27      Fill Date ID Written Drug Qty Days Prescriber Rx # Pharmacy Refill Daily Dose * Pymt Type   12/26/2018 2  11/27/2018 Oxycodone Hcl 5 MG Tablet 168 28 [facility] [id number] Gra (3731) 0 45.00 [place]    Bridge prescription to next appointment. Prescription sent on 1/30 had old instructions (old start and stop dates) new prescription with correction.    Routed to Anne-Marie

## 2021-06-23 NOTE — PROGRESS NOTES
Mental Health Visit Note    1/23/2019    Start time: 1100    Stop Time: 1150   Session # 1 (2019)    Session Type: Patient is presenting for an Individual session.    Randi Damon is a 63 y.o. female is being seen today for    Chief Complaint   Patient presents with     Depression/Anxiety   .     New symptoms or complaints: None    Functional Impairment:   Personal: 4  Family: 4  Work: 4  Social:4    Clinical assessment of mental status: Patient presented alert and oriented x3.  She was well-groomed.  Her attire was appropriate.  Patient was cooperative.  Patient motor actively was normal and eye contact appropriate.  Patients mood was anxious and affect congruent.  Patient s speech and language was rapid.  Patient attention and thought process normal.  Concentration was brief.  Patient denied delusions or hallucinations. Patient denied suicidal or homicidal ideation.  Patient denied any long or short term memory problems. No evidence of impairment in judgment.She has insight and fund of knowledge was adequate.        Suicidal/Homicidal Ideation present: None Reported This Session    Patient's impression of their current status: Pt reports significant stress, anxiety & depression related to ongoing health issues and chronic pain.     Therapist impression of patients current state: Pt discussed the great impact chronic pain has on her life and feeling overwhelmed by the process. Discussed struggles with sleep, anxiety, motivation and pain that impact all areas of her life.  Pt discussed growing up in a family (specifically mother) who was chronically ill.  Discussed all health issues of her mom, sister, brother and father who have all passed away.  She fears being labled as a hypochondriac or the idea that all she talks about is her health issues. Processed feelings of isolation and others not understanding what is like to live with chronic health issues, including her  who is passively supportive.       Type of psychotherapeutic technique provided: Insight oriented, Client centered, Solution-focused and CBT    Progress toward short term goals:continued to discuss short-term goals    Review of long term goals: continued to discuss long-term goals    Diagnosis:   1. Severe recurrent major depression without psychotic features (H)    2. Generalized anxiety disorder    3. Chronic post-traumatic stress disorder (PTSD)    4. Chronic pain syndrome        Plan and Follow up: Follow up with Mabel in 1-2 weeks  Follow [facility] with [facility] as scheduled  Follow up with all providers as scheduled  Practice self-care and forgiveness regularly       Discharge Criteria/Planning: Patient will continue with follow-up until therapy can be discontinued without return of signs and symptoms.    Shanice Merida 1/23/2019

## 2021-06-23 NOTE — TELEPHONE ENCOUNTER
Order sent to Dr Dubois in Anne-Marie's absence.    Last filled 12/25      [place] 11/27  [place]  2/15      12/26/2018 2  11/27/2018 Oxycodone Hcl 5 MG Tablet 168 28 [facility] [id number] Gra (8120) 0 45.00 [id number]

## 2021-06-23 NOTE — PROGRESS NOTES
Progress Note    Patient Name: Randi Cleary  Date: 6/23/21         Service Type: Individual      Session Start Time: 4:05 PM  Session End Time: 4:50 PM     Session Length: 45 minutes    Session #: 45    Attendees: Client attended alone    Service Modality:  Video Visit:    Telemedicine Visit: The patient's condition can be safely assessed and treated via synchronous audio and visual telemedicine encounter.      Reason for Telemedicine Visit: Services only offered telehealth    Originating Site (Patient Location): Patient's home    Distant Site (Provider Location): Provider Remote Setting- Home Office    Consent:  The patient/guardian has verbally consented to: the potential risks and benefits of telemedicine (video visit) versus in person care; bill my insurance or make self-payment for services provided; and responsibility for payment of non-covered services.     Mode of Communication:  Video Conference via [employer]    As the provider I attest to compliance with applicable laws and regulations related to telemedicine.      Treatment Plan Last Reviewed: 6/10/21  PHQ-9 / CHAVO-7 :   PHQ 5/21/2021 6/10/2021 6/23/2021   PHQ-9 Total Score 7 10 13   Q9: Thoughts of better off dead/self-harm past 2 weeks Not at all Not at all Not at all   F/U: Thoughts of suicide or self-harm - - -   F/U: Self harm-plan - - -   F/U: Self-harm action - - -   F/U: Safety concerns - - -   Some encounter information is confidential and restricted. Go to Review Flowsheets activity to see all data.     CHAVO-7 SCORE 5/12/2021 5/21/2021 6/10/2021   Total Score 9 (mild anxiety) 9 (mild anxiety) 11 (moderate anxiety)   Total Score 9 9 11   Some encounter information is confidential and restricted. Go to Review Flowsheets activity to see all data.         DATA  Interactive Complexity: No  Crisis: No       Progress Since Last Session (Related to Symptoms / Goals / Homework):   Symptoms: Improving Patient is  moving around more and has more hope    Homework: Achieved / completed to satisfaction  Continue to find hope. -done  Keep busy with the planting. -done     Episode of Care Goals: Satisfactory progress - ACTION (Actively working towards change); Intervened by reinforcing change plan / affirming steps taken     Current / Ongoing Stressors and Concerns:   Patient is currently socially isolated. She has a conflictual relationship with her .  She is getting minimal physical activity.  She had recent eye surgery     Treatment Objective(s) Addressed in This Session:   Patient will increase frequency of engaging her in ADLs.  Patient will track and record at least 5 pleasant exchanges with . Patient will be able to identify at least 5 positive traits about her .  Patient will reduce level of depressive and anxious features as evidenced by reduction in score on her CHAVO-7 and PHQ-9 (scores of 15 and 16 at first measurment, respectively).     Intervention:   CBT: Person-Centered Therapy: Patient discussed hope and ways she has been finding happiness. Discussed concerns about surgeries being delayed. Talked about her high school reunion.      ASSESSMENT: Current Emotional / Mental Status (status of significant symptoms):   Risk status (Self / Other harm or suicidal ideation)   Patient denies current fears or concerns for personal safety.   Patient denies current or recent suicidal ideation or behaviors.   Patient denies current or recent homicidal ideation or behaviors.   Patient denies current or recent self injurious behavior or ideation.   Patient denies other safety concerns.   Patient reports there has been no change in risk factors since their last session.     Patient reports there has been no change in protective factors since their last session.     A safety and risk management plan has been developed including: Patient consented to co-developed safety plan.  Safety and risk management plan was  completed.  Patient agreed to use safety plan should any safety concerns arise.  A copy was given to the patient. This was done on 2020 and is attached to the bottom of the note.     Appearance:   Appropriate    Eye Contact:   Fair    Psychomotor Behavior: Normal    Attitude:   Cooperative    Orientation:   All   Speech    Rate / Production: Normal/ Responsive    Volume:  Normal    Mood:    Normal   Affect:    Appropriate    Thought Content:  Clear    Thought Form:  Coherent    Insight:    Fair      Medication Review:   No changes to current psychiatric medication(s)     Medication Compliance:   Yes     Changes in Health Issues:   None reported     Chemical Use Review:   Substance Use: Chemical use reviewed, no active concerns identified      Tobacco Use: No current tobacco use.      Diagnosis:  1. Major depressive disorder, recurrent episode, moderate with anxious distress (H)    2. CHAVO (generalized anxiety disorder)      Collateral Reports Completed:   Not Applicable    PLAN: (Patient Tasks / Therapist Tasks / Other)  Continue to find small ways to find britney.    MICAELA SLADE    2021                                                         ______________________________________________________________________    Treatment Plan    Patient's Name: Randi Cleayr  YOB: 1955    Date: 6/10/21    DSM5 Diagnoses: 296.32 (F33.1) Major Depressive Disorder, Recurrent Episode, Moderate With anxious distress  Psychosocial / Contextual Factors: Patient's entire family of origin has , she now has a sister-in-law and  as support.  Relationship with  is conflictual.  Patient is reporting increase in physical symptoms due to COVID-19.  Patient is off of pain medications.  WHODAS: 42    Referral / Collaboration:  Referral to another professional/service is not indicated at this time.     Anticipated number of session or this episode of care: 12-15      MeasurableTreatment  "Goal(s) related to diagnosis / functional impairment(s)  Goal 1: Patient will \"jumpstart, getting going with the things I need to be doing around the house as far as picking up, doing things, trying to do something every day.  Also to lessen the animosity between me and my .\"    I will know I've met my goal when my shoulders are fixed and I can see.      Objective #A (Patient Action)    Patient will increase frequency of engaging her in ADLs.  Status: Continued - Date(s): 6/10/21    Intervention(s)  Therapist will engage patient in CBT, specifically behavioral activation.    Objective #B  Patient will track and record at least 5 pleasant exchanges with . Patient will be able to identify at least 5 positive traits about her  and how he relates to her.  Status: Continued - Date(s): 6/10/21    Intervention(s)  Therapist will teach assertiveness skills and assign homework related to relationship interactions.    Objective #C  Patient will reduce level of depressive and anxious features as evidenced by reduction in score on her CHAVO-7 and PHQ-9 (scores of 15 and 16 at first measurment, respectively).  Status: Continued - Date(s):  6/10/21    Intervention(s)  Therapist will engage patient in person-centered therapy and CBT.    Patient has reviewed and agreed to the above plan.      MICAELA SLADE  Jenny 10, 2021                                                Randi Cleary          SAFETY PLAN:  Step 1: Warning signs / cues (Thoughts, images, mood, situation, behavior) that a crisis may be developing:  ? Thoughts: \"I don't want to continue\" \"I am unwanted\"  ? Images: none  ? Thinking Processes: ruminating  ? Mood: anger  ? Behaviors: isolating/withdrawing , can't stop crying, not taking care of myself and not taking care of my responsibilities  ? Situations: small triggers, such as not being able to find something, or dropping something   Step 2: Coping strategies - Things I can do to take my " "mind off of my problems without contacting another person (relaxation technique, physical activity):  ? Distress Tolerance Strategies:  arts and crafts: drawing, play with my pet , listen to positive and upbeat music: any, change body temperature (ice pack/cold water)  and paced breathing/progressive muscle relaxation  ? Physical Activities: go for a walk, deep breathing and stretching   ? Focus on helpful thoughts:  \"You've been through this before, you can get through it again.\"  Step 3: People and social settings that provide distraction:                 Name: Carmen                            Name: Darien                           Name: [place]       ? pool, shopping, Carmen's house, [employer]       Step 4: Remind myself of people and things that are important to me and worth living for:  Clifford Little Donna, post-COVID world, options of what could be in your future        Step 5: When I am in crisis, I can ask these people to help me use my safety plan:                 Name: Sidney  Step 6: Making the environment safe:   ? go to sleep/daydream  Step 7: Professionals or agencies I can contact during a crisis:  ? [facility] Daytime Number: [phone]  ? Suicide Prevention Lifeline: 0-982-360-TALK (2550)  ? Crisis Text Line Service (available 24 hours a day, 7 days a week): Text [place] to 726168    Local Crisis Services: [facility] Crisis: [phone]     Call 911 or go to my nearest emergency department.       I helped develop this safety plan and agree to use it when needed.  I have been given a copy of this plan.       Client signature _________________________________________________________________  Today s date:  11/24/2020  Adapted from Safety Plan Template 2008 Randi Poole and Robby Barba is reprinted with the express permission of the authors.  No portion of the Safety Plan Template may be reproduced without the express, written permission.  You can contact the authors at " [email or web address] or [email or web address].

## 2021-06-24 ASSESSMENT — PATIENT HEALTH QUESTIONNAIRE - PHQ9: SUM OF ALL RESPONSES TO PHQ QUESTIONS 1-9: 13

## 2021-06-24 NOTE — TELEPHONE ENCOUNTER
Prior Authorization Request  Who s requesting: Anne-Marie Little CNP/ Reina Kimball CMA  Pharmacy Name and Location: [facility]  Phone [phone]  Fax [phone]  Medication Name: Fentanyl 12mcg  Insurance Plan: Medicare  Insurance Member ID Number:  [id number]  Informed patient that prior authorizations can take up to 10 business days for response:   Yes  Okay to leave a detailed message: Yes

## 2021-06-24 NOTE — TELEPHONE ENCOUNTER
FYI - Status Update  Who is Calling: Patient  Update: Patient stated their shoulder surgery had to be rescheduled due to the excessive amount of snow days. Then the patient had to see an oral surgeon because they broke their tooth off at the gums. The patient stated the surgery is rescheduled for 4/19/19.  Okay to leave a detailed message?:  No return call needed

## 2021-06-24 NOTE — PROGRESS NOTES
Preoperative Exam    Scheduled Procedure:  SHOULDER ARTHROSCOPY, DECOMPRESSION, ROTATOR CUFF REPAIR Left General with Scalene Block   DISTAL CLAVICLE EXCISION, POSSIBBLE BICEPS TENODESIS VERSUS DEBRIDEMENT         Surgery Date:  2/27/19  Surgery Location: [facility], fax [phone]    Surgeon:  Dr. Scott    Assessment/Plan:     1. Preop exam for internal medicine  Winnie is medically stable for the above surgical procedure.  She is currently free from symptoms of infectious disease nature.  She has no history of primary coronary artery disease, arrhythmia, type 2 diabetes.  However, she does have COPD, morbid obesity and is quite sedentary with regard to physical activity.  She has no history of allergy to anesthesia but has had postoperative hypoxemia and respiratory insufficiency.  Of note, she is wearing a fentanyl patch for chronic pain syndrome    Medications are reviewed.  She is currently on no aspirin or NSAIDs.  She will take her levothyroxine on the a.m. of surgery.  She will skip her vitamin supplements.  Her  will provide aftercare.    - Electrocardiogram Perform and Read  - HM2(CBC w/o Differential)  - Basic Metabolic Panel  - XR Chest 2 Views    2. Sleep apnea, obstructive  She is currently not using a CPAP device    3. Chronic obstructive pulmonary disease, unspecified COPD type (H)  History of moderate COPD.  Recommendation: Continue with Symbicort 2 puffs every 12 hours.  Albuterol neb or inhaler as needed.  Will check chest x-ray today.  - XR Chest 2 Views- Negative for infiltrate    4.  Chronic opioid use  She is currently using a fentanyl patch.    5.  History of acute postoperative respiratory insufficiency  History of respiratory insufficiency following anesthesia    Surgical Procedure Risk: Low (reported cardiac risk generally < 1%)  Have you had prior anesthesia?: Yes  Have you or any family members had a previous anesthesia reaction:  No  Do you or any family members have a  history of a clotting or bleeding disorder?: Yes: Family history of blood clots  Cardiac Risk Assessment: no increased risk for major cardiac complications/however has underlying lung disease    Patient approved for surgery with general or local anesthesia.        Functional Status: Independent  Patient plans to recover at home with family.     Subjective:      Randi Damon is a 63 y.o. female who presents for a preoperative consultation.  Of note, Randi is a patient who has a complex past medical history.  Of note, she is here today for preoperative consultation prior to having surgery for rotator cuff repair and shoulder repair.  The details are outlined above.  Of note, she is looking forward to having improvement of her pain as it is quite debilitating for her.    Her history is complex.  She has a past medical history of hemochromatosis and psoriasis with likely psoriatic arthritis.  She has a chronic pain syndrome.  She is currently being managed at the pain clinic for chronic pain secondary to arthritis, severe foot deformity, and spinal stenosis.  Additionally, she has psychiatric disease with anxiety and depression.  She has been recently very stable.    Pertinent anesthesia history is reviewed.  She denies any allergic reactions to general or local anesthetic agents.  However, she describes having respiratory complications from a recent anesthesia.  She denies any personal or family history of blood clots or DVTs.  There is no history of bleeding disorder.  She is currently a non-smoker.  She does not have type 2 diabetes.  She does not have primary coronary artery disease or arrhythmia.  She does not have chronic kidney disease.  However, she does have chronic lung disease.  She is obese and sedentary.    All other systems reviewed and are negative, other than those listed in the HPI.    Pertinent History  Do you have difficulty breathing or chest pain after walking up a flight of stairs: Yes:  Secondary to both obesity and COPD  History of obstructive sleep apnea: Yes: She is currently not wearing her sleep apnea device  Steroid use in the last 6 months: Yes: She has had an occasional short burst of steroids for COPD exacerbation  Frequent Aspirin/NSAID use: Yes: She is currently not using aspirin  Prior Blood Transfusion: No  Prior Blood Transfusion Reaction: No  If for some reason prior to, during or after the procedure, if it is medically indicated, would you be willing to have a blood transfusion?:  There is no transfusion refusal.    Current Outpatient Medications   Medication Sig Dispense Refill     albuterol (PROAIR HFA;PROVENTIL HFA;VENTOLIN HFA) 90 mcg/actuation inhaler Inhale 2 puffs every 6 (six) hours as needed for wheezing.       albuterol (PROVENTIL) 2.5 mg /3 mL (0.083 %) nebulizer solution Take 3 mL (2.5 mg total) by nebulization every 6 (six) hours as needed for wheezing. 75 mL 12     alclomethasone (ACLOVATE) 0.05 % cream        betamethasone valerate (VALISONE) 0.1 % ointment Apply hs 45 g 1     budesonide-formoterol (SYMBICORT) 160-4.5 mcg/actuation inhaler Inhale 2 puffs 2 (two) times a day. 1 Inhaler 1     clobetasol (TEMOVATE) 0.05 % cream        cyanocobalamin, vitamin B-12, 2,500 mcg Subl Place under the tongue 2 (two) times a week.       diclofenac sodium (VOLTAREN) 1 % Gel Apply 2 g topically 2 (two) times a day as needed (pain on shoulder and ankle). 100 g 12     DULoxetine (CYMBALTA) 60 MG capsule Take 60 mg by mouth at bedtime.  1     ergocalciferol (VITAMIN D2) 50,000 unit capsule Take 1 capsule (50,000 Units total) by mouth 2 (two) times a week. 24 capsule 3     fentaNYL (DURAGESIC) 12 mcg/hr Place 1 patch on the skin every third day. 10 patch 0     folic acid (FOLVITE) 1 MG tablet TAKE 1 TABLET (1 MG) BY ORAL ROUTE ONCE DAILY 90 tablet 3     HYDROcodone-acetaminophen 5-325 mg per tablet Take 1 tablet by mouth 3 (three) times a day as needed for pain. 90 tablet 0      levothyroxine (SYNTHROID, LEVOTHROID) 150 MCG tablet TAKE 1 TAB BY MOUTH ALTERNATING WITH 175 MCG 90 tablet 3     levothyroxine (SYNTHROID, LEVOTHROID) 175 MCG tablet TAKE 1 TAB BY MOUTH EVERY OTHER DAY ALTERNATING WITH  MCG DOSE 90 tablet 3     LORazepam (ATIVAN) 1 MG tablet Take 1 mg by mouth 2 (two) times a day as needed.   0     naloxone (NARCAN) 4 mg/actuation nasal spray 1 spray (4 mg dose) into one nostril for opioid reversal. Call 911. May repeat if no response in 3 minutes. 1 Box 0     omeprazole (PRILOSEC) 20 MG capsule TAKE 1 CAPSULE BY MOUTH 2 TIMES A  capsule 3     polyethylene glycol (MIRALAX) 17 gram packet Take 1 packet (17 g total) by mouth daily. 30 packet 5     pramipexole (MIRAPEX) 1 MG tablet Take 0.5-1 tablets (0.5-1 mg total) by mouth 2 (two) times a day. 60 tablet 5     temazepam (RESTORIL) 7.5 MG capsule        triamcinolone (KENALOG) 0.1 % cream Apply a thin layer to affected area once daily 15 g 0     No current facility-administered medications for this visit.         Allergies   Allergen Reactions     Cephalexin Other (See Comments)     Muscles aches,fatigue     Levofloxacin      Reaction unknown     Penicillins      Mouth sores     Sulfa (Sulfonamide Antibiotics)      Reaction not known       Patient Active Problem List   Diagnosis     Psoriatic Arthropathy     Osteopenia     Restless Legs Syndrome     Vitamin B12 deficiency     Depression     Postablative hypothyroidism     Vitamin D Deficiency     Hemochromatosis     Impaired Fasting Glucose     History of breast cancer     Morbid Obesity     Hypertension due to endocrine disorder     Esophageal Reflux     Psoriasis     Spinal Stenosis     Benign Adenomatous Polyp Of The Large Intestine     Joint Pain, Localized In The Hip     Anxiety state, unspecified     Sacroiliitis (H)     Neck pain     Acute pain of right shoulder     COPD (chronic obstructive pulmonary disease) (H)     Sleep apnea, obstructive      Pheochromocytoma, benign, left     Acute postoperative respiratory insufficiency     Anxiety     Hypoglycemia     Drug-induced constipation     Hereditary hemochromatosis (H)       Past Medical History:   Diagnosis Date     Anxiety      Breast cancer (H) 1994     Chronic pain syndrome      COPD (chronic obstructive pulmonary disease) (H)      Depression      Esophageal reflux      Graves disease      Hemochromatosis      Hx antineoplastic chemotherapy      Hx of radiation therapy      Hypertension      Impaired fasting glucose      Pheochromocytoma      Psoriatic arthropathy (H)      Restless leg syndrome      Right rotator cuff tear      Sleep apnea 2017    CPAP     Spinal stenosis      Urinary incontinence      Vitamin B12 deficiency        Past Surgical History:   Procedure Laterality Date     BREAST BIOPSY       BREAST LUMPECTOMY Left 1994     JOINT REPLACEMENT       LAPAROSCOPIC ADRENALECTOMY Left 8/2/2017    Procedure: LAPAROSCOPIC LEFT ADRENALECTOMY, ;  Surgeon: Gab Linares MD;  Location: [facility] [place];  Service:      MASTECTOMY      left lumpectomy with axillary node dissection     AL ARTHRODESIS,ANKLE,OPEN Right     Description: Ankle Arthrodesis;  Recorded: 04/07/2010;     AL MASTECTOMY, MODIFIED RADICAL      Description: Modified Radical Mastectomy Left Breast;  Recorded: 04/07/2010;     AL REMOVE TONSILS/ADENOIDS,<13 Y/O      Description: Tonsillectomy With Adenoidectomy;  Recorded: 04/07/2010;     reconstructive breast surgery Right      REDUCTION MAMMAPLASTY Right     approx late [id number]     TONSILLECTOMY         Social History     Socioeconomic History     Marital status:      Spouse name: Not on file     Number of children: Not on file     Years of education: Not on file     Highest education level: Not on file   Social Needs     Financial resource strain: Not on file     Food insecurity - worry: Not on file     Food insecurity - inability: Not on file     Transportation needs  "- medical: Not on file     Transportation needs - non-medical: Not on file   Occupational History     Not on file   Tobacco Use     Smoking status: Former Smoker     Last attempt to quit: 8/1/2003     Years since quitting: 15.5     Smokeless tobacco: Never Used   Substance and Sexual Activity     Alcohol use: No     Drug use: No     Sexual activity: Not on file   Other Topics Concern     Not on file   Social History Narrative     Not on file             Objective:     Vitals:    02/19/19 1205   BP: 136/82   Patient Site: Right Arm   Patient Position: Sitting   Cuff Size: Adult Large   Pulse: 98   SpO2: 94%   Weight: (!) 247 lb 6 oz (112.2 kg)   Height: 5' 6\" (1.676 m)           Physical Exam:  EYES: Eyelids, conjunctiva, and sclera were normal. Pupils were normal. Cornea, iris, and lens were normal bilaterally.  HEAD, EARS, NOSE, MOUTH, AND THROAT: Head and face were normal. Hearing was normal to voice and the ears were normal to external exam. Nose appearance was normal and there was no discharge. Oropharynx was normal.  TMs were normal.  NECK: Neck appearance was normal. There were no neck masses and the thyroid was not enlarged.  RESPIRATORY: Breathing pattern was normal and the chest moved symmetrically.   Lung sounds were diminished bilaterally.  With forced vital capacity maneuver, there was expiratory wheezing noted.  CARDIOVASCULAR: Heart rate and rhythm were with mild tachycardia.  S1 and S2 were normal and there were no extra sounds or murmurs. Peripheral pulses in arms and legs were normal.  Jugular venous pressure was normal.  There was no peripheral edema.  GASTROINTESTINAL: The abdomen was normal in contour.  Bowel sounds were present.   Palpation detected no tenderness, mass, or enlarged organs.   MUSCULOSKELETAL: Skeletal configuration was normal and muscle mass was normal for age. Joint appearance was overall normal.  LYMPHATIC: There were no enlarged nodes.  SKIN/HAIR/NAILS: Skin color was normal.  " There were no abnormal skin lesions.  Hair and nails were normal.  NEUROLOGIC: The patient was alert and oriented to person, place, time, and circumstance. Speech was normal. Cranial nerves were normal. Motor strength was normal for age. The patient was normally coordinated.  PSYCHIATRIC:  Mood and affect were normal and the patient had normal recent and remote memory. The patient's judgment and insight were normal.          There are no Patient Instructions on file for this visit.        Labs:  Recent Results (from the past 24 hour(s))   Electrocardiogram Perform and Read    Collection Time: 02/19/19 12:15 PM   Result Value Ref Range    SYSTOLIC BLOOD PRESSURE  mmHg    DIASTOLIC BLOOD PRESSURE  mmHg    VENTRICULAR RATE 100 BPM    ATRIAL RATE 100 BPM    P-R INTERVAL 146 ms    QRS DURATION 112 ms    Q-T INTERVAL 358 ms    QTC CALCULATION (BEZET) 461 ms    P Axis 54 degrees    R AXIS 82 degrees    T AXIS 24 degrees    MUSE DIAGNOSIS       Normal sinus rhythm  Incomplete right bundle branch block  Borderline ECG  When compared with ECG of 12-MAY-2017 11:53,  No significant change was found         Immunization History   Administered Date(s) Administered     INFLUENZA,RECOMBINANT,INJ,PF QUADRIVALENT 18+YRS 12/19/2018     Influenza [id number], 02/23/2010     Influenza, inj, historic,unspecified 12/04/2007, 12/10/2008, 12/10/2008, 02/23/2010, 09/17/2010, 09/07/2011     Influenza, seasonal,quad inj 36+ mos 10/27/2015, 09/06/2017     Influenza, seasonal,quad inj 6-35 mos 11/14/2012, 10/16/2013, 10/01/2014     Pneumo Conj 13-V (2010&after) 12/10/2014     Pneumo Polysac 23-V 03/23/2000     Tdap 05/21/2008           Electronically signed by Loida Stockton MD 02/19/19 12:03 PM

## 2021-06-24 NOTE — TELEPHONE ENCOUNTER
"Patient left a FYI message for provider Anne-Marie. Patient had cancelled her shoulder surgery, because of \"snow\" days. Just couldn't put everything together. Thinks last Friday, broke a tooth at the gum line. Saw dental surgeon. Having too much stress. Now is rescheduled for surgery on 4/19/19. Will be seeing you in couple weeks. Just wanted to update you, Anne-Marie.  "

## 2021-06-24 NOTE — TELEPHONE ENCOUNTER
Patient was in on February 21st to see Mabel Merida and notified us that one of her Fentanyl patches did not stick and fell off. Patient will need a refill sooner than anticipated as she had to open a new packet for a new patch. Provider has been made aware of this as well

## 2021-06-24 NOTE — PROGRESS NOTES
Mental Health Visit Note    2/21/2019  Start time: 1100    Stop Time: 1150   Session # 4    Session Type: Patient is presenting for an Individual session.    Randi Damon is a 63 y.o. female is being seen today for    Chief Complaint   Patient presents with     Depression   .     New symptoms or complaints: None    Functional Impairment:   Personal: 4  Family: 4  Work: 4  Social:4    Clinical assessment of mental status: Patient presented alert and oriented x3.  She was well-groomed.  Her attire was appropriate.  Patient was cooperative.  Patient motor actively was normal and eye contact appropriate.  Patients mood was anxious and affect congruent.  Patient s speech and language was normal.  Patient attention and thought process normal.  Concentration was appropriate.  Patient denied delusions or hallucinations. Patient denied suicidal or homicidal ideation.  Patient denied any long or short term memory problems. No evidence of impairment in judgment.  She has insight and fund of knowledge was adequate.        Suicidal/Homicidal Ideation present: None Reported This Session    Patient's impression of their current status: Pt reports ongoing psychosocial stressors impacting mental and physical health.     Therapist impression of patients current state: Processed current psychosocial stressors and coping strategies.  Discussed support system and relationship struggles. Processed concerns about health issues and current management skills.     Type of psychotherapeutic technique provided: Insight oriented, Client centered, Solution-focused and CBT    Progress toward short term goals:Progress as expected, self-care, coping skills    Review of long term goals: Date of last review 2/6/2019    Diagnosis:   1. Severe recurrent major depression without psychotic features (H)    2. Generalized anxiety disorder    3. Chronic post-traumatic stress disorder (PTSD)    4. Chronic pain syndrome        Plan and Follow up: Follow  up with Mabel in 2 weeks  Practice self-care and coping skills as discussed  Follow up with all providers as scheduled      Discharge Criteria/Planning: Patient will continue with follow-up until therapy can be discontinued without return of signs and symptoms.    Shanice Merida 3/7/2019

## 2021-06-24 NOTE — PROGRESS NOTES
Patient is here for a follow up with Anne-Marie Little CNP for her shoulder and neck pain                *Universal Precautions:    UDT/SWAB- 04/14/2018  Consent/Agreement- 06/15/2018  Pharmacy- as documented    Count- n/a   Psychological evaluation last OV 02/06/2019 with Mabel Merida  6/15/18 MME=50  02/15/19 MME= 45  Pharmacogenetic testing- n/a  MTM: n/a  Naloxone safety: 10/16/2018      Patient could not get scheduled until March 19th due to her schedule. Patient advised to call 3-5 days before running out of medication for a refill

## 2021-06-24 NOTE — TELEPHONE ENCOUNTER
Completed  Requested Prescriptions     Signed Prescriptions Disp Refills     fentaNYL (DURAGESIC) 12 mcg/hr 10 patch 0     Sig: Place 1 patch on the skin every third day.     Authorizing Provider: HUMAIRA HANSEN

## 2021-06-24 NOTE — PROGRESS NOTES
Assessment: Dated 2/15/2019 NDI Score: 58    Pain Intensity:  2-The pain is moderate at the moment    Personal Care:  3-I need some help but manage most of my personal care.     Lifting:  3-Pain prevents me from lifting heavy weights, but I can manage light to medicum weights if they are conveniently positioned.     Reading:   3-I can't read as much as I want b/c of moderate pain in my neck    Headaches:  3-I have moderate headache  Which come frequently    Concentration:  2-I have a fair degree of difficulty in concentrating when I want to    Work:  3-I cannot do my usual work     Driving:  3-I can't drive my car as long as I want b/c of moderate pain in my neck    Sleepin-My sleep is greatly disturbed (3-5 hours sleepless)    Recreation:  3--I am able to engage in a few of my usual recreational activities b/c of pain in my neck.

## 2021-06-24 NOTE — TELEPHONE ENCOUNTER
Left a long message about having problems with her Fentanyl Patches. Thinks Anne-Marie knows about issue with one patch. Another one #5 patch, fell off. #6 patch flipped over. Starting to get the hang of it. Has arthritic fingers, so makes it hard to use.  Will need refill on Monday.

## 2021-06-24 NOTE — PATIENT INSTRUCTIONS - HE
Plan:   Plan of Care / NextSteps:     Follow up the following date: 1 month    Education:   Please call Monday-Friday for problems or questions and one of the clinical support staff (CSS) will help to get things figured out. The number is [phone].     [employer] is a means to send an e-mail ([employer] message) to communicate any concerns.     Please remember some issues require an office visit.     Today we reviewed the plan of care and answered questions.     ________________________________________________  Medical cannabis has been approved for specific qualifying conditions in [place].    Your medical staff does not prescribe medical cannabis. Marijuana is classified as a Schedule I substance per the [employer]-this is a Federal Agency. Medical providers are not able to prescribe Schedule I drugs. The [facility] has approved medical cannabis for Qualifying Conditions. A person would need to be Certified as having a Qualifying Condition. A registered medical provider may Certify the Qualifying Condition.    Once Certified with a Qualifying Condition a person can schedule with a Dispensary. The Dispensary staff would determine the preparation of medical cannabis. It is expected the Dispensary would be following a person to determine the impact of the medical cannabis. During these visit with the Dispensary staff adjustments in the medical cannabis would be made. The goal of the visits to to provide adjustments, monitor for harm, improve side effects and promote the greatest impact on the Qualifying Condition.     Note: We expect you will reduce w/ a goal of full discontinuation, any opioid pain medication. We will need to figure out treatment for any out of state travel.    Medical cannabis is not obtained from another state, friends, from the streets or grown in your own home. In the [facility] it is expected a person who has Qualified would obtain the medical cannabis from one of the approved Dispensaries.      The  "make-up of medical cannabis typically has lower THC levels. THC is responsible for the \"high\" associated with recreational marijuana. There is typically a ratio between THC and CBD levels. Folks may get CBD over the counter or order online as CBD does not cause the \"high\".    I expect to learn more about the risks, benefits and legal issues over time. This is not a treatment that has been well researched due to the schedule I status. It will be important for participants to provide information on side effects and benefits. It does not have strong medical data to support. If you have been reluctant to participate in other recommended treatments because of concerns about being a \"guinea pig' this is not a good option for your health care.    At this time medical cannabis is not covered by medical insurance. Costs include but may not be limited to: registration fee; visit fee and product fee. You would need to be in communication with the medical cannabis program for the greatest insights about the cost.    If this is an area of interest please research at www.health.[facility].[place]/topics/cannabis. www.FDA.gov    We will discuss further after your surgery  ________________________________________    Records: Reviewed to assist with preparation for the office visit and are reflected throughout the note.    Primary Care: You need to have an annual physical and check in with your primary care folks on a regular basis. Talk with your primary care about the frequency of expected visits.     Behavioral Medicine: Continue with Mabel Merida.    We discussed one of the measures of the medical cannabis would be the ability to eliminate the lorazepam.     Interventional:   The CDC Guideline is not about post-operative pain management it is about the primary care approach to pain management. As part of research medicine has identified that abused medication in part may be coming from unused medication after surgery. For this reason the " "surgeons are offering a bit less medication at a time and are monitoring. Your opioid agreement does not cover post-surgical pain management as this is a more acute situation and not chronic pain care.    I would expect the surgeon and the surgeons team to managed the pain after the surgery. I would expect you will briefly need more medication as this is not part of you chronic pain care. Use of your chronic pain medication for an acute situation prevents you from being able to fill the medication as it appears you are overtaking the medication and is looked at differently by your insurance company.    If you have pain that is uncontrolled it may be a complication of surgery and this would need to be evaluated further. Should you be struggling you must take all your pills to the surgeon office or to an ED where they can do a count to demonstrate you are not drug seeking.     We discussed you are going to [facility] and you can get the       Integrative Approaches:   You can look up Low FODMAP  The term FODMAP is an acronym, derived from \"Fermentable Oligo-, Di-, Mono-saccharides And Polyols\". FODMAPs are short chain carbohydrates that are poorly absorbed in the small intestine. They include short chain oligo-saccharide polymers of fructose and galactooligosaccharides, disacchari rowan, monosaccharides, and sugar alcohols, such as sorbitol, mannitol, xylitol and maltitol. FODMAPs are naturally present in food and the human diet.    Medication:   Medication prescribed today: We changed the opioid medication to give more longevity to the medication b/c currently the pain medicine is not lasting beyond 3 hours and you do not feel like you are managing.     Requested Prescriptions     Signed Prescriptions Disp Refills     fentaNYL (DURAGESIC) 12 mcg/hr new 10 patch 0     Sig: Place 1 patch on the skin every third day.     HYDROcodone-acetaminophen 5-325 mg per tablet new 90 tablet 0     Sig: Take 1 tablet by mouth " 3 (three) times a day as needed for pain.     naloxone (NARCAN) 4 mg/actuation nasal spray 1 Box 0     Si spray (4 mg dose) into one nostril for opioid reversal. Call 911. May repeat if no response in 3 minutes.         REFILL INSTRUCTIONS: Please be mindful to chose a single means of communication about medication refills as multiple means of communication for the same prescription request  (your pharmacy, mychart and telephone calls) leads to confusion and delays    Note: Due to the increase in paperwork we are no longer leaving voice mail messages when refills have been sent to the pharmacy    Please contact the clinic 3-7 days before your refill is due. Speak clearly; note cell phones cut in-and-out and poor quality speech and reception issues will influence our ability to hear you and be efficient with your prescription.     Call [phone] leave:   Your name (first and last w/ spelling)   Date of birth  Name of all the medication(s) being requested  Dose of the medication(s)   How you are taking the medication (eg. twice per day etc).     Contact your pharmacy and talk with your pharmacist about any government Controlled/Scheduled medications 3 (three) days after leaving your message to see if your prescription has been received. Please request the pharmacist check your profile to be certain about any concerns with a script failing to be received.   If the script has not been received there may have been a problem with the communication please reach back out to the clinic.      SAFETY REMINDER  We need to be able to reach you. Please be certain we have a working telephone number. If we are unable to reach you we may not be able to continue to prescribe opioid medication.        FAQ  Q. Why is alcohol consumption a concern with pain medication?  A. Opioids decrease you drive to breathe. Alcohol enhances this effect.  Using together or within a week of each other is unsafe. If we can see it in the urine it  "is having an effect on your body.     Q. I live with chronic pain and take my medication like it is prescribed why am I at risk for an overdose?  A. Opioids decrease your drive to breathe. Generally a little decrease in drive to breathe is manageable. Illness like bladder infection, pneumonia, COPD, sleep apnea may decrease your ability to get oxygen. This can lead to an overdose.    If you are running a fever medication may be absorbed differently.     Nausea and vomiting have led to folks \"losing\" medication - additional dosing because we do not know how much may have been absorbed can lead to an overdose.     As we age general health changes occure and this can lead to increased issues with clearing medication from the liver or kidneys increasing the risk of an overdose.    Q. I have been using benzodiazepines for years with my opioids what happened that they should not be used together any more?  A. Combinations of medication can put a person at high risk for overdose. In one study it was noted that 80% of overdoses occurred in people who were taking a combination of opioids and benzodiazepines.    Q. I hear about Naloxone and I understand is a medicine that can be used if there is a concern about an overdose, should I have it available at home?  A. Anyone with an MME over 50 or who uses combinations of medication that enhance the effects of an opioid is a good candidate for Naloxone. If you do not have Naloxone ask me we can talk about it together.    Q. Why should I have a safe?  A. You assume the responsibility of harm or death another person should someone else use your medication. A big concern would be if someone else got your medication. Your medication is not prescribed to anyone other than you. Do not sell, loan, borrow or share your medication with anyone. It is illegal.     Q. What does an overdose look like?  A. Symptoms of overdose include:   !breathing slow and shallow, erratic or not at " all  !pinpoint pupils, hallucinations  !confusion  !muscle jerks, slack muscles   !extreme sleepiness or loss of alertness   !awake but not able to talk   !face pale or clammy, vomiting, for lighter skinned people, the skin tone turns bluish purple, for darker skinned people, it turns grayish or ashen   If in a situation where overdose is a concern engage the emergency response system (dial 911).

## 2021-06-24 NOTE — TELEPHONE ENCOUNTER
The patient called and left a message stating the fentanyl needed a PA. The [facility] pharmacy she goes to in [place] was able to get the pa for med but they do not have the med in stock. They have to order it and it will take 3-4 days. Nurse called the pharmacy to confirm this. Nurse asked the pharmacist if there are other pharmacy's near by that have it in stock. The [facility] on [address] [phone] has the med in stock. Nurse spoke with the pharmacist at new location and was told he ran the prescription through to patients insurance. Nurse will call [facility] in [place] to cxl prescription and reque up med to be sent to [facility] on [address]. The pt was called and informed of this change so she can get refill of fentanyl today.

## 2021-06-24 NOTE — PROGRESS NOTES
[facility] Follow Up Note    Assessment/Plan:  1. Psoriatic arthritis (H)/psoriasis  This appears to be clinically active in the setting of a chronic pain scenario.  Recommendation: Would like her to seek a rheumatologic opinion with regard to her overall history of hemochromatosis, psoriatic arthritis, psoriasis.  There is a strong family history of lupus and other connective tissue disorders as well.  Although she does have DJD, she has multiple pain complaints.  If this could be controlled, it may eliminate a certain aspect of her pain.  - Ambulatory referral to Rheumatology    2. Morbid obesity (H)  Have discussed the impact of this with regard to DJD and joint pain    3. Hereditary hemochromatosis (H)  Last ferritin level was low-hemoglobin 16.7.  Monitoring    4. Chronic obstructive pulmonary disease   Patient with chronic symptoms.  She uses an  Symbicort as needed.  Recommendation: We will renew inhaler.  If she can afford this, would like her to use 2 puffs twice daily.  She will be returning next week for a preoperative examination and will reassess lung function at that time  - budesonide-formoterol (SYMBICORT) 160-4.5 mcg/actuation inhaler; Inhale 2 puffs 2 (two) times a day.  Dispense: 1 Inhaler; Refill: 1    5. Chronic pain syndrome  As above, multifactorial.  Has severe DJD, severe foot pain from fusion, multiple aches and pains and active psoriasis.  History is positive for hemochromatosis which can contribute to arthritis symptoms.  She also has significant psoriasis.  She has not seen a rheumatologist in greater than 2 years.  She is frightened of being on chronic opioids.  She would like to enroll in Medical cannabis  Recommendations: Agree with recommendations by her pain clinic.  Medical cannabis could be a good option for her although I question whether she would be able to afford this.  Additionally, she should see a rheumatologist      Follow-up next week for  vaishnavi Stockton MD    Chief Complaint:  Chief Complaint   Patient presents with     Follow-up       History of Present Illness:  Randi is a 63 y.o. female who is here today for a discussion of her chronic pain situation.  Of note, she has had a lifelong history of chronic pain.  She has been on medical disability for pain dating back to 1998.  She states it all started when she fell and injured her ankle.  She had an ankle fusion at that time and since then has been in pain.  As a result of this, she has had extensive weight gain, development of DJD in multiple joints.  In fact she is having shoulder repair next week.  Additionally, she has active and untreated psoriasis.  Her history is positive for hemochromatosis as well.  She has not required transfusions in greater than 10 years.  Her history is also significant for remote history of breast cancer.  She has had a resected pheochromocytoma.    She has chronic mental health issues.  She is under the care of psychiatry and psychology as well.    The discussion centers around medical marijuana.  She is tired of being on chronic opioids.  She would like to be considered for other therapies.  At the pain Center, apparently, is willing to certify her for this.    Review of Systems:  A comprehensive review of systems was performed and was otherwise negative    PFSH:  Social History: She is .  She has no children.  Her  expresses some frustration with her inactivity and sedentary lifestyle  Social History     Tobacco Use   Smoking Status Former Smoker     Last attempt to quit: 8/1/2003     Years since quitting: 15.5   Smokeless Tobacco Never Used       Past History:   Past Medical History:   Diagnosis Date     Anxiety      Breast cancer (H) 1994     Chronic pain syndrome      COPD (chronic obstructive pulmonary disease) (H)      Depression      Esophageal reflux      Graves disease      Hemochromatosis      Hx antineoplastic chemotherapy       Hx of radiation therapy      Hypertension      Impaired fasting glucose      Pheochromocytoma      Psoriatic arthropathy (H)      Restless leg syndrome      Right rotator cuff tear      Sleep apnea 2017    CPAP     Spinal stenosis      Urinary incontinence      Vitamin B12 deficiency        Current Outpatient Medications   Medication Sig Dispense Refill     albuterol (PROAIR HFA;PROVENTIL HFA;VENTOLIN HFA) 90 mcg/actuation inhaler Inhale 2 puffs every 6 (six) hours as needed for wheezing.       albuterol (PROVENTIL) 2.5 mg /3 mL (0.083 %) nebulizer solution Take 3 mL (2.5 mg total) by nebulization every 6 (six) hours as needed for wheezing. 75 mL 12     alclomethasone (ACLOVATE) 0.05 % cream        betamethasone valerate (VALISONE) 0.1 % ointment Apply hs 45 g 1     clobetasol (TEMOVATE) 0.05 % cream        cyanocobalamin, vitamin B-12, 2,500 mcg Subl Place under the tongue 2 (two) times a week.       diclofenac sodium (VOLTAREN) 1 % Gel Apply 2 g topically 2 (two) times a day as needed (pain on shoulder and ankle). 100 g 12     DULoxetine (CYMBALTA) 60 MG capsule Take 60 mg by mouth at bedtime.  1     ergocalciferol (VITAMIN D2) 50,000 unit capsule Take 1 capsule (50,000 Units total) by mouth 2 (two) times a week. 24 capsule 3     folic acid (FOLVITE) 1 MG tablet TAKE 1 TABLET (1 MG) BY ORAL ROUTE ONCE DAILY 90 tablet 3     levothyroxine (SYNTHROID, LEVOTHROID) 150 MCG tablet TAKE 1 TAB BY MOUTH ALTERNATING WITH 175 MCG 90 tablet 3     levothyroxine (SYNTHROID, LEVOTHROID) 175 MCG tablet TAKE 1 TAB BY MOUTH EVERY OTHER DAY ALTERNATING WITH  MCG DOSE 90 tablet 3     LORazepam (ATIVAN) 1 MG tablet Take 1 mg by mouth 2 (two) times a day as needed.   0     naloxone (NARCAN) 4 mg/actuation nasal spray 1 spray (4 mg dose) into one nostril for opioid reversal. Call 911. May repeat if no response in 3 minutes. 1 Box 0     omeprazole (PRILOSEC) 20 MG capsule TAKE 1 CAPSULE BY MOUTH 2 TIMES A  capsule 3      "oxyCODONE (ROXICODONE) 5 MG immediate release tablet Take 1 tablet (5 mg total) by mouth every 4 (four) hours as needed for pain. 90 tablet 0     polyethylene glycol (MIRALAX) 17 gram packet Take 1 packet (17 g total) by mouth daily. 30 packet 5     pramipexole (MIRAPEX) 1 MG tablet Take 0.5-1 tablets (0.5-1 mg total) by mouth 2 (two) times a day. 60 tablet 5     temazepam (RESTORIL) 7.5 MG capsule        triamcinolone (KENALOG) 0.1 % cream Apply a thin layer to affected area once daily 15 g 0     budesonide-formoterol (SYMBICORT) 160-4.5 mcg/actuation inhaler Inhale 2 puffs 2 (two) times a day. 1 Inhaler 1     No current facility-administered medications for this visit.        Family History: Strongly positive for connective tissue disorder.  Hemochromatosis, lupus, etc.    Physical Exam:  General Appearance:   She appears at baseline.  Vitals:    02/13/19 1146   BP: 134/72   Patient Site: Left Arm   Patient Position: Sitting   Cuff Size: Adult Large   Pulse: 98   SpO2: 100%   Weight: (!) 248 lb 6 oz (112.7 kg)   Height: 5' 5\" (1.651 m)     Wt Readings from Last 3 Encounters:   02/13/19 (!) 248 lb 6 oz (112.7 kg)   12/19/18 (!) 244 lb 9.6 oz (110.9 kg)   11/27/18 (!) 240 lb (108.9 kg)     Body mass index is 41.33 kg/m .      Time spent today approximately/> than 40 minutes with more than half that time spent in counseling and discussion of pain management  "

## 2021-06-24 NOTE — TELEPHONE ENCOUNTER
Plan:  Requested Prescriptions     Signed Prescriptions Disp Refills     fentaNYL (DURAGESIC) 12 mcg/hr 10 patch 0     Sig: Place 1 patch on the skin every third day.     Authorizing Provider: HUMAIRA HANSEN

## 2021-06-25 ENCOUNTER — AMBULATORY - HEALTHEAST (OUTPATIENT)
Dept: INTERNAL MEDICINE | Facility: CLINIC | Age: 66
End: 2021-06-25

## 2021-06-25 DIAGNOSIS — J01.01 ACUTE RECURRENT MAXILLARY SINUSITIS: ICD-10-CM

## 2021-06-25 DIAGNOSIS — E78.5 DYSLIPIDEMIA: ICD-10-CM

## 2021-06-25 RX ORDER — LOSARTAN POTASSIUM 25 MG/1
25 TABLET ORAL DAILY
Qty: 90 TABLET | Refills: 3 | Status: SHIPPED | OUTPATIENT
Start: 2021-06-25 | End: 2021-08-09

## 2021-06-25 NOTE — PROGRESS NOTES
Patient is here for a follow up with Anne-Marie Little CNP for her neck and shoulder pain          *Universal Precautions:    UDT/SWAB- 04/14/2018  Consent/Agreement- 06/15/2018  Pharmacy- as documented    Count- n/a   Psychological evaluation last OV 02/06/2019 with Mabel Merida  6/15/18 MME=50  02/15/19 MME= 45  03/19/19 MME- 43  Pharmacogenetic testing- n/a  MTM: n/a  Naloxone safety: 10/16/2018

## 2021-06-25 NOTE — PATIENT INSTRUCTIONS - HE
"Plan:   Plan of Care / NextSteps:     Follow up the following date: 4/18/19      Education:   Please call Monday-Friday for problems or questions and one of the clinical support staff (CSS) will help to get things figured out. The number is [phone].     The [employer] is a means to send an e-mail ([employer] message) to communicate any concerns.     Please remember some issues require an office visit.     Today we reviewed the plan of care and answered questions.      Records: Reviewed to assist with preparation for the office visit and are reflected throughout the note.    Primary Care: You need to have an annual physical and check in with your primary care folks on a regular basis. Talk with your primary care about the frequency of expected visits.     Behavioral Medicine: continue with Mabel    Interventional: Your shoulder surgery is coming up.    Rehabilitation: I understand you are planning to go to the pool    Integrative Approaches:   You can look up Low FODMAP  The term FODMAP is an acronym, derived from \"Fermentable Oligo-, Di-, Mono-saccharides And Polyols\". FODMAPs are short chain carbohydrates that are poorly absorbed in the small intestine. They include short chain oligo-saccharide polymers of fructose and galactooligosaccharides, disacchari rowan, monosaccharides, and sugar alcohols, such as sorbitol, mannitol, xylitol and maltitol. FODMAPs are naturally present in food and the human diet.    You could also look at the [employer] diet    Medication:   Medication prescribed today:   Prescriptions Disp Refills     fentaNYL (DURAGESIC) 12 mcg/hr Please fill 04/12/2019 for use 04/13-05/13 10 patch 0     Sig: Place 1 patch on the skin every third day.     HYDROcodone-acetaminophen 5-325 mg per tablet 3/19-4/18 120 tablet 0     Sig: Take 1 tablet by mouth 4 (four) times a day as needed for pain.     transparent dressings (TEGADERM) 2.375 X 4 \" Bndg 20 each 1     Sig: Apply over the fentanyl patch to secure in place " "        SAFETY REMINDER  We need to be able to reach you. Please be certain we have a working telephone number. If we are unable to reach you we may not be able to continue to prescribe opioid medication.        FAQ  Q. Why is alcohol consumption a concern with pain medication?  A. Opioids decrease you drive to breathe. Alcohol enhances this effect.  Using together or within a week of each other is unsafe. If we can see it in the urine it is having an effect on your body.     Q. I live with chronic pain and take my medication like it is prescribed why am I at risk for an overdose?  A. Opioids decrease your drive to breathe. Generally a little decrease in drive to breathe is manageable. Illness like bladder infection, pneumonia, COPD, sleep apnea may decrease your ability to get oxygen. This can lead to an overdose.    If you are running a fever medication may be absorbed differently.     Nausea and vomiting have led to folks \"losing\" medication - additional dosing because we do not know how much may have been absorbed can lead to an overdose.     As we age general health changes occure and this can lead to increased issues with clearing medication from the liver or kidneys increasing the risk of an overdose.    Q. I have been using benzodiazepines for years with my opioids what happened that they should not be used together any more?  A. Combinations of medication can put a person at high risk for overdose. In one study it was noted that 80% of overdoses occurred in people who were taking a combination of opioids and benzodiazepines.    Q. I hear about Naloxone and I understand is a medicine that can be used if there is a concern about an overdose, should I have it available at home?  A. Anyone with an MME over 50 or who uses combinations of medication that enhance the effects of an opioid is a good candidate for Naloxone. If you do not have Naloxone ask me we can talk about it together.    Q. Why should I have a " safe?  A. You assume the responsibility of harm or death another person should someone else use your medication. A big concern would be if someone else got your medication. Your medication is not prescribed to anyone other than you. Do not sell, loan, borrow or share your medication with anyone. It is illegal.     Q. What does an overdose look like?  A. Symptoms of overdose include:   !breathing slow and shallow, erratic or not at all  !pinpoint pupils, hallucinations  !confusion  !muscle jerks, slack muscles   !extreme sleepiness or loss of alertness   !awake but not able to talk   !face pale or clammy, vomiting, for lighter skinned people, the skin tone turns bluish purple, for darker skinned people, it turns grayish or ashen   If in a situation where overdose is a concern engage the emergency response system (dial 911).

## 2021-06-25 NOTE — PROGRESS NOTES
"Patient called asking for a refill on her losartan. She also wanted to let the office know that she tested positive for COVID after being vaccinated and is feeling fatigued. Advised that these symptoms will linger for a while, but if she starts to feel worse, has an increased SOB or CP to go to the ER immediately. Patient stated she has seen her PCP since getting out of quarantine and was prescribed steroids and maybe that could also be a reason why she is feeling \"not like myself.\" Asked patient to call me anytime with concerns. Bindu Walden RN on 6/25/2021 at 10:02 AM     "

## 2021-06-25 NOTE — TELEPHONE ENCOUNTER
PT called and LMTCB to inform of Covid results - OK to relay results    Surgery centers will call her to reschedule surgeries    [facility] and [place] [facility] called and informed of positive Covid test

## 2021-06-25 NOTE — TELEPHONE ENCOUNTER
Verify that she got notified by us that her COVID is positive.  Also verify that she was offered the regeneron infusion.  Please notify both of her surgery centers that her surgeries are both postponed.  See how she feels

## 2021-06-25 NOTE — TELEPHONE ENCOUNTER
"-Coronavirus (COVID-19) Notification    Caller Name (Patient, parent, daughter/son, grandparent, etc)  Patient    Reason for call  Notify of Positive Coronavirus (COVID-19) lab results, assess symptoms,  review [facility] recommendations    Lab Result    Lab test:  2019-nCoV rRt-PCR or SARS-CoV-2 PCR    Oropharyngeal AND/OR nasopharyngeal swabs is POSITIVE for 2019-nCoV RNA/SARS-COV-2 PCR (COVID-19 virus)    RN Recommendations/Instructions per [facility] Coronavirus COVID-19 recommendations    Brief introduction script  Introduce self and then review script:  \"I am calling on behalf of [employer].  We were notified that your Coronavirus test (COVID-19) for was POSITIVE for the virus.  I have some information to relay to you but first I wanted to mention that the [place] Dept of Health will be contacting you shortly [it's possible MD already called Patient] to talk to you more about how you are feeling and other people you have had contact with who might now also have the virus.  Also, [facility] is Partnering with the [facility] for Covid-19 research, you may be contacted directly by research staff.\"    Assessment (Inquire about Patient's current symptoms)   Assessment   Current Symptoms at time of phone call: (if no symptoms, document No symptoms] None    Symptom onset (if applicable) NA     If at time of call, Patients symptoms hare worsened, the Patient should contact 911 or have someone drive them to Emergency Dept promptly:      If Patient calling 911, inform 911 personal that you have tested positive for the Coronavirus (COVID-19).  Place mask on and await 911 to arrive.    If Emergency Dept, If possible, please have another adult drive you to the Emergency Dept but you need to wear mask when in contact with other people.      Monoclonal Antibody Administration    You may be eligible to receive a new treatment with a monoclonal antibody for preventing hospitalization in patients at " "high risk for complications from COVID-19.   This medication is still experimental and available on a limited basis; it is given through an IV and must be given at an infusion center. Please note that not all people who are eligible will receive the medication since it is in limited supply.     Are you interested in being considered for this medication?  No.  Does the patient fit the criteria: No    If patient qualifies based on above criteria:  \"You will be contacted if you are selected to receive this treatment in the next 1-2 business days.   This is time sensitive and if you are not selected in the next 1-2 business days, you will not receive the medication.  If you do not receive a call to schedule, you have not been selected.\"    Review information with Patient    Your result was positive. This means you have COVID-19 (coronavirus).  We have sent you a letter that reviews the information that I'll be reviewing with you now.    How can I protect others?    If you have symptoms: stay home and away from others (self-isolate) until:    You've had no fever--and no medicine that reduces fever--for 1 full day (24 hours). And      Your other symptoms have gotten better. For example, your cough or breathing has improved. And     At least 10 days have passed since your symptoms started. (If you ve been told by a doctor that you have a weak immune system, wait 20 days.)     If you don't have symptoms: Stay home and away from others (self-isolate) until at least 10 days have passed since your first positive COVID-19 test. (Date test collected).    During this time:    Stay in your own room, including for meals. Use your own bathroom if you can.    Stay away from others in your home. No hugging, kissing or shaking hands. No visitors.     Don't go to work, school or anywhere else.     Clean  high touch  surfaces often (doorknobs, counters, handles, etc.). Use a household cleaning spray or wipes. You'll find a full list on " the EPA website at www.epa.gov/pesticide-registration/list-n-disinfectants-use-against-sars-cov-2.     Cover your mouth and nose with a mask, tissue or other face covering to avoid spreading germs.    Wash your hands and face often with soap and water.    Make a list of people you have been in close contact with recently, even if either of you wore a face covering.   ; Start your list from 2 days before you became ill or had a positive test.  ; Include anyone that was within 6 feet of you for a cumulative total of 15 minutes or more in 24 hours. (Example: if you sat next to Primo for 5 minutes in the morning and 10 minutes in the afternoon, then you were in close contact for 15 minutes total that day. Primo would be added to your list.)    A public health worker will call or text you. It is important that you answer. They will ask you questions about possible exposures to COVID-19, such as people you have been in direct contact with and places you have visited.    Tell the people on your list that you have COVID-19; they should stay away from others for 14 days starting from the last time they were in contact with you (unless you are told something different from a public health worker).     Caregivers in these groups are at risk for severe illness due to COVID-19:  o People 65 years and older  o People who live in a nursing home or long-term care facility  o People with chronic disease (lung, heart, cancer, diabetes, kidney, liver, immunologic)  o People who have a weakened immune system, including those who:  - Are in cancer treatment  - Take medicine that weakens the immune system, such as corticosteroids  - Had a bone marrow or organ transplant  - Have an immune deficiency  - Have poorly controlled HIV or AIDS  - Are obese (body mass index of 40 or higher)  - Smoke regularly    Caregivers should wear gloves while washing dishes, handling laundry and cleaning bedrooms and bathrooms.    Wash and dry laundry with  special caution. Don't shake dirty laundry, and use the warmest water setting you can.    If you have a weakened immune system, ask your doctor about other actions you should take.    For more tips, go to www.cdc.gov/coronavirus/2019-ncov/downloads/10Things.pdf.    You should not go back to work until you meet the guidelines above for ending your home isolation. You don't need to be retested for COVID-19 before going back to work--studies show that you won't spread the virus if it's been at least 10 days since your symptoms started (or 20 days, if you have a weak immune system).    Employers: This document serves as formal notice of your employee's medical guidelines for going back to work. They must meet the above guidelines before going back to work in person.    How can I take care of myself?    1. Get lots of rest. Drink extra fluids (unless a doctor has told you not to).    2. Take Tylenol (acetaminophen) for fever or pain. If you have liver or kidney problems, ask your family doctor if it's okay to take Tylenol.     Take either:     650 mg (two 325 mg pills) every 4 to 6 hours, or     1,000 mg (two 500 mg pills) every 8 hours as needed.     Note: Don't take more than 3,000 mg in one day. Acetaminophen is found in many medicines (both prescribed and over-the-counter medicines). Read all labels to be sure you don't take too much.    For children, check the Tylenol bottle for the right dose (based on their age or weight).    3. If you have other health problems (like cancer, heart failure, an organ transplant or severe kidney disease): Call your specialty clinic if you don't feel better in the next 2 days.    4. Know when to call 911: Emergency warning signs include:    Trouble breathing or shortness of breath    Pain or pressure in the chest that doesn't go away    Feeling confused like you haven't felt before, or not being able to wake up    Bluish-colored lips or face    5. Sign up for [employer]. We know  it's scary to hear that you have COVID-19. We want to track your symptoms to make sure you're okay over the next 2 weeks. Please look for an email from [email or web address]--this is a free, online program that we'll use to keep in touch. To sign up, follow the link in the email. Learn more at [email or web address].    Where can I get more information?    [facility]: [email or web address]    Coronavirus Basics: www.health.[facility].[place]./diseases/coronavirus/basics.html    What to Do If You're Sick: www.cdc.gov/coronavirus/2019-ncov/about/steps-when-sick.html    Ending Home Isolation: www.cdc.gov/coronavirus/2019-ncov/hcp/disposition-in-home-patients.html     Caring for Someone with COVID-19: www.cdc.gov/coronavirus/2019-ncov/if-you-are-sick/care-for-someone.html     [facility] clinical trials (COVID-19 research studies): [email or web address]-[email or web address]     A Positive COVID-19 letter will be sent via [employer] or the Mail.  (Exception, no letters sent to Presurgerical/Preprocedure Patients)    Tiana White LPN

## 2021-06-25 NOTE — PROGRESS NOTES
Clinic Care Coordination Contact  [facility]/Voicemail       Clinical Data: Care Coordinator Outreach  Outreach attempted x 2.  Left message on patient's voicemail with call back information and requested return call.  Plan: Care Coordinator will send unable to contact letter with care coordinator contact information via [employer]. Care Coordinator will try to reach patient again in 3 weeks due to vacation.  6/22/2021

## 2021-06-26 NOTE — PROGRESS NOTES
Clinic Care Coordination Contact    Follow Up Progress Note      Assessment: [facility] RN spoke with patient today to follow up on goal and to discuss an needs or concerns. Patient said she was found to be positive for the COVID-19 virus on Saturday. Current symptoms include a dry cough and fatigue. No shortness of breath, nausea, diarrhea or vomiting. Patient aware if symptoms increase she should be evaluated. Patient reported she did have her COVID vaccines. She continues to quarantine at home.   Patient stated she has been a little more emotional lately. She said she was informed by her Orthopedic Surgeon from [facility] that a neck fusion was recommended. Patient said she was not expecting this course of treatment. Patient is getting a second opinion and said she may go to the [facility]. Patient continues to see her therapist a regular basis. Patient appreciative of call today.       Goals addressed this encounter:   Goals Addressed                 This Visit's Progress       Patient Stated      Mental Health Management (pt-stated)        Goal Statement: I would like to have my depression under better control in the next 6 months.   Date Goal set: 12/29/20  Barriers: Long history of depression  Strengths: Strong advocate for herself  Date to Achieve By: 5/29/21  Patient expressed understanding of goal: Yes  Action steps to achieve this goal:  1. I will continue to attend all scheduled appointments with Dr. Dubois and follow up on his recommendations.   2. I will take my medications ordered by Dr. Dubois as prescribed.   3. I will continue to attend all scheduled appointments with my therapist, Corine.  4. I will consider returning to swimming at the [employer] as I know this helps me both physically and mentally. No as of yet 4/13  5. I will continue to participate in activities that I enjoy and keeps me busy at home.   6. I will continue to report progress towards this goals at scheduled outreach  telephone calls from the CCC team.     1/27/2021- Updated and discussed  2/8/21 - Updated and discussed.   3/12/21 - Discussed   4/13= discussed  6/10/21 - Discussed                  Intervention/Education provided during outreach: Discussed the importance of taking her medications as directed. Encouraged patient to attend all scheduled follow up appointments. Encouraged patient to monitor her symptoms closely and to seek medical assistance if symptoms increase.      Outreach Frequency: monthly    Plan: CCC RN will continue to monitor, support patient with current goals and will be available to assist as nursing needs arise.     Care Coordinator will follow up in one month.

## 2021-06-26 NOTE — PROGRESS NOTES
Preoperative Exam    Scheduled Procedure: (L) Carpel Tunnel and (R) Foot bone spur and hammer toe  Surgery Date:  6/8/21 and 6/9/21  Surgery Location: [facility]    Surgeon:  Dr Grewal and Dr Coughlin    Assessment/Plan:     1. Preop general physical exam  Winnie is medically stable for anesthesia prior to of 2 surgical procedures.  She has multiple complex medical problems but they are all currently stable. She has had hypoxemia post anesthetic events in the past.  Her most recent cataract surgeries occurred uneventfully.  She has obstructive sleep apnea-with CPAP machine -not recently using.     Medications are reviewed: She will hold her losartan and furosemide on the morning of surgery.  She will take her usual inhalers and Synthroid on the a.m. prior to her procedure.    Aftercare will be provided by her .      - Asymptomatic COVID-19 Virus (CORONAVIRUS) PCR  - Electrocardiogram Perform and Read-personally reviewed.  A right bundle branch block is seen but otherwise no acute abnormalities are noted.    2. Cervical stenosis of spinal canal  Recently diagnosed cervical stenosis of the spinal canal.  She has just learned that she will likely need a 3 level fusion.    3. Sleep apnea, obstructive  Noted history of sleep apnea-she has not been using her machine.  She is instructed to bring this along with her to both of her surgical events.    4. Type 2 diabetes mellitus without complication, without long-term current use of insulin (H)  Right stable with normal hemoglobin A1c.  Recent weight loss.    5. Chronic obstructive pulmonary disease, unspecified COPD type (H)  Bill today.  She will use her inhalers prior to her surgery.    6. Hypertension due to endocrine disorder  Stable on current medications    7. Postablative hypothyroidism  Labile TSH    8. Hereditary hemochromatosis (H)  History of hereditary hemochromatosis    9. Severe major depression (H)  With question of bipolar  diagnosis.  Followed by psychiatry and Dr. Dubois from the chronic pain clinic.    10. Vitamin B12 deficiency  She is status post bariatric surgery    11. Chronic intractable pain  Tonic back and neck pain as well as foot pain.  Followed by pain clinic-off narcotics    12. Gastroesophageal reflux disease with esophagitis  Stable  - omeprazole (PRILOSEC) 20 MG capsule; TAKE 1 CAPSULE BY MOUTH 2 TIMES A DAY  Dispense: 180 capsule; Refill: 3    13. Restless Legs Syndrome  Increased restless legs.  Will increase ropinirole to 4 mg q. evening  - rOPINIRole (REQUIP) 2 MG tablet; Take one late afternoon and hs  Dispense: 60 tablet; Refill: 5        There are no Patient Instructions on file for this visit.    Surgical Procedure Risk: Low (reported cardiac risk generally < 1%)  Have you had prior anesthesia?: Yes  Have you or any family members had a previous anesthesia reaction:  No  Do you or any family members have a history of a clotting or bleeding disorder?: No  Cardiac Risk Assessment: no increased risk for major cardiac complications    APPROVAL GIVEN to proceed with proposed procedure, without further diagnostic evaluation    Functional Status: Independent  Patient plans to recover at home with family.     Subjective:      Randi Damon is a 65 y.o. female who presents for a preoperative consultation.     HPI related to upcoming procedure: Winnie is a 65-year-old female who is here today for preoperative evaluation prior to 2 surgeries.  She will have an open carpal tunnel release on June 8 followed by foot surgery on June 9.  According to the patient, both physicians are aware of these back-to-back procedures.  She is not worried about how she will recover with out the use of her left hand.  She states she will be in a boot following her foot surgery.    Currently, she is feeling fine.  She denies a cough, chest pain or shortness of breath.  There are no new bowel or bladder issues.    Pertinent medical  history is reviewed: She denies any allergic or hyperthermic reactions to local or general anesthetic agents.  In the past, she has had an episode of post anesthesia hypoxemia.  More recent surgeries have gone without difficulty.  She does not have primary coronary artery disease-but there is some question of pulmonary hypertension.  She has chronic lung disease-COPD secondary to a history of smoking.  She has stable and diet-controlled type 2 diabetes.  She does not have chronic kidney disease.  She currently is a non-smoker.  She does not drink alcohol.  She is somewhat sedentary.    Preop Questions 6/3/2021   Have you ever had a heart attack or stroke? No   Have you ever had surgery on your heart or blood vessels, such as a stent placement, a coronary artery bypass, or surgery on an artery in your head, neck, heart, or legs? No   Do you have chest pain with activity? No   Do you have a history of  heart failure? No   Do you currently have a cold, bronchitis or symptoms of other infection? No   Do you have a cough, shortness of breath, or wheezing? No   Do you or anyone in your family have previous history of blood clots? YES -mother and sister have had blood clots   Do you or does anyone in your family have a serious bleeding problem such as prolonged bleeding following surgeries or cuts? No   Have you ever had problems with anemia or been told to take iron pills? YES -    Have you had any abnormal blood loss such as black, tarry or bloody stools, or abnormal vaginal bleeding? No   Have you ever had a blood transfusion? No   Are you willing to have a blood transfusion if it is medically needed before, during, or after your surgery? Yes   Have you or any of your relatives ever had problems with anesthesia? No   Do you have sleep apnea, excessive snoring or daytime drowsiness? YES -has CPAP machine   Do you have a CPAP machine? Yes   Do you have any artifical heart valves or other implanted medical devices like a  pacemaker, defibrillator, or continuous glucose monitor? No   Do you have artificial joints? YES -    Are you allergic to latex? No             ROS:        Current Outpatient Medications   Medication Sig Dispense Refill     albuterol (PROAIR HFA;PROVENTIL HFA;VENTOLIN HFA) 90 mcg/actuation inhaler Inhale 2 puffs every 4 (four) hours as needed. 8.5 g 12     albuterol (PROVENTIL) 2.5 mg /3 mL (0.083 %) nebulizer solution Take 3 mL (2.5 mg total) by nebulization every 6 (six) hours as needed for wheezing. 75 mL 12     aspirin 81 MG EC tablet Take 81 mg by mouth daily.       cholecalciferol, vitamin D3, (VITAMIN D3) 5,000 unit Tab Take 10,000 Units by mouth daily.       cyanocobalamin, vitamin B-12, 5,000 mcg/mL Drop Take 5,000 mcg by mouth daily.        fluticasone-umeclidinium-vilanterol (TRELEGY ELLIPTA) 100-62.5-25 mcg DsDv inhaler Inhale 1 Inhalation daily. 3 each 3     furosemide (LASIX) 20 MG tablet Take 1 tablet (20 mg total) by mouth daily. 180 tablet 3     HYDROcodone-acetaminophen 5-325 mg per tablet Take 1-2 tablets by mouth every 6 (six) hours as needed.       KLOR-CON M20 20 mEq tablet Take 1 tablet (20 mEq total) by mouth 2 (two) times a day. 180 tablet 3     Lactobacillus rhamnosus GG (CULTURELLE) 10-15 Billion cell capsule Take 1 capsule by mouth daily.       losartan (COZAAR) 25 MG tablet Take 1 tablet (25 mg total) by mouth daily. 90 tablet 3     methocarbamoL (ROBAXIN) 500 MG tablet Take 500 mg by mouth at bedtime.        omeprazole (PRILOSEC) 20 MG capsule TAKE 1 CAPSULE BY MOUTH 2 TIMES A  capsule 2     rOPINIRole (REQUIP) 2 MG tablet Take 1 tablet (2 mg total) by mouth at bedtime. 30 tablet 5     SYNTHROID 150 mcg tablet Take 150 mcg by mouth daily.       UNABLE TO FIND Take 6 tablets by mouth daily. Med Name: reacted behzad-mag supplement - 200 mg calcium, 40 phosphorus, 175 magnesium       UNABLE TO FIND Take 500 mg by mouth daily. Med Name: Tumeric       UNABLE TO FIND Take 1 tablet by  mouth daily. Med Name: methyl-B complex       ketorolac (ACULAR) 0.5 % ophthalmic solution Place 1 drop into left eye three times a day as directed.  Start 3 days before surgery       levothyroxine (SYNTHROID, LEVOTHROID) 150 MCG tablet Take 1 tablet (150 mcg total) by mouth daily. TAY SYNTHROID 90 tablet 2     ofloxacin (OCUFLOX) 0.3 % ophthalmic solution Instill 1 drop into left eye three times a day as directed. Start 3 days before surgery       prednisoLONE acetate (PRED-FORTE) 1 % ophthalmic suspension place 1 drop into the left eye three times a day as directed. start 3 days before surgery.  0     Current Facility-Administered Medications   Medication Dose Route Frequency Provider Last Rate Last Admin     cyanocobalamin injection 1,000 mcg  1,000 mcg Intramuscular Q30 Days Loida Stockton MD   1,000 mcg at 05/28/19 0912        Allergies   Allergen Reactions     Riluzole Swelling     Swollen face and tongue     Voltaren [Diclofenac Sodium]      Restless legs     Buspar [Buspirone]      Serotonin syndrome     Cephalexin Other (See Comments)     Muscles aches,fatigue     Gabapentin      Drove on the wrong side of the highway     Levofloxacin      Reaction unknown     Penicillins      Mouth sores     Pristiq [Desvenlafaxine]      Serotonin syndrome       Sulfa (Sulfonamide Antibiotics)      Reaction not known       Patient Active Problem List   Diagnosis     Psoriatic Arthropathy     Osteopenia     Restless Legs Syndrome     Vitamin B12 deficiency     Postablative hypothyroidism     Vitamin D Deficiency     History of breast cancer     Morbid Obesity     Hypertension due to endocrine disorder     Esophageal Reflux     Psoriasis     Spinal Stenosis     Benign Adenomatous Polyp Of The Large Intestine     Joint Pain, Localized In The Hip     Anxiety state, unspecified     Sacroiliitis (H)     Neck pain     COPD (chronic obstructive pulmonary disease) (H)     Sleep apnea, obstructive     Drug-induced constipation      Hereditary hemochromatosis (H)     Malignant neoplasm of left female breast, unspecified estrogen receptor status, unspecified site of breast (H)     Chronic intractable pain     Pulmonary hypertension (H)     Type 2 diabetes mellitus without complication, without long-term current use of insulin (H)     Severe major depression (H)       Past Medical History:   Diagnosis Date     Anxiety      Breast cancer (H) 1994     Chronic pain syndrome      COPD (chronic obstructive pulmonary disease) (H)      Depression      Esophageal reflux      Graves disease      Hemochromatosis      Hx antineoplastic chemotherapy      Hx of radiation therapy      Hypertension      Impaired fasting glucose      Pheochromocytoma      Psoriasis      Psoriatic arthropathy (H)      Restless leg syndrome      Right rotator cuff tear      Serotonin syndrome 8/28/2020     Sleep apnea 2017    CPAP     Spinal stenosis      Urinary incontinence      Vitamin B12 deficiency        Past Surgical History:   Procedure Laterality Date     Adrenalectomy for pheochromocytoma       BREAST BIOPSY       BREAST LUMPECTOMY Left 1994     JOINT REPLACEMENT       LAPAROSCOPIC ADRENALECTOMY Left 8/2/2017    Procedure: LAPAROSCOPIC LEFT ADRENALECTOMY, ;  Surgeon: Gab Linares MD;  Location: [facility];  Service:      MASTECTOMY      left lumpectomy with axillary node dissection     NC ARTHRODESIS,ANKLE,OPEN Right     Description: Ankle Arthrodesis;  Recorded: 04/07/2010;     NC MASTECTOMY, MODIFIED RADICAL      Description: Modified Radical Mastectomy Left Breast;  Recorded: 04/07/2010;     NC REMOVE TONSILS/ADENOIDS,<13 Y/O      Description: Tonsillectomy With Adenoidectomy;  Recorded: 04/07/2010;     reconstructive breast surgery Right      REDUCTION MAMMAPLASTY Right     approx late 2015/early2016     TONSILLECTOMY         Social History     Socioeconomic History     Marital status:      Spouse name: Not on file     Number of children: Not on  "file     Years of education: Not on file     Highest education level: Not on file   Occupational History     Not on file   Social Needs     Financial resource strain: Not on file     Food insecurity     Worry: Not on file     Inability: Not on file     Transportation needs     Medical: Not on file     Non-medical: Not on file   Tobacco Use     Smoking status: Former Smoker     Quit date: 2003     Years since quittin.8     Smokeless tobacco: Never Used   Substance and Sexual Activity     Alcohol use: No     Drug use: No     Sexual activity: Not on file   Lifestyle     Physical activity     Days per week: Not on file     Minutes per session: Not on file     Stress: Not on file   Relationships     Social connections     Talks on phone: Not on file     Gets together: Not on file     Attends Shinto service: Not on file     Active member of club or organization: Not on file     Attends meetings of clubs or organizations: Not on file     Relationship status: Not on file     Intimate partner violence     Fear of current or ex partner: Not on file     Emotionally abused: Not on file     Physically abused: Not on file     Forced sexual activity: Not on file   Other Topics Concern     Not on file   Social History Narrative     Not on file       Patient Care Team:  Loida Stockton MD as PCP - General  Kimberlyn Ojeda, PharmD as Pharmacist (Pharmacist)  Lani Tirado NP as Assigned Behavioral Health Provider  Reina Morillo MD as Assigned Pulmonology Provider  Myhre, David J, RN as Lead Care Coordinator (Primary Care - CC)  Cari Denise CHW as Community Health Worker (Primary Care - CC)  Rosibel Teran MD as Physician (Internal Medicine)  Loida Stockton MD as Assigned PCP        Objective:     Vitals:    21 1242   BP: 98/58   Pulse: 80   Resp: 20   SpO2: 95%   Weight: 218 lb 1.6 oz (98.9 kg)   Height: 5' 6\" (1.676 m)         Physical Exam:  EYES: Eyelids, conjunctiva, and sclera " were normal. Pupils were normal. Cornea, iris, and lens were normal bilaterally.  HEAD, EARS, NOSE, MOUTH, AND THROAT: Head and face were normal. Hearing was normal to voice and the ears were normal to external exam. Nose appearance was normal and there was no discharge. Oropharynx was normal.  TMs were normal.  NECK: Neck appearance was normal. There were no neck masses and the thyroid was not enlarged.  RESPIRATORY: Breathing pattern was normal and the chest moved symmetrically.   Lung sounds were equal bilaterally.  CARDIOVASCULAR: Heart rate and rhythm were normal.  S1 and S2 were normal and there were no extra sounds or murmurs. Peripheral pulses in arms and legs were normal.  Jugular venous pressure was normal.  There was no peripheral edema.  GASTROINTESTINAL: The abdomen was normal in contour.  Bowel sounds were present.   Palpation detected no tenderness, mass, or enlarged organs.   MUSCULOSKELETAL: Skeletal configuration was normal and muscle mass was normal for age. Joint appearance was overall normal.  LYMPHATIC: There were no enlarged nodes.  SKIN/HAIR/NAILS: Skin color was normal.  There were no abnormal skin lesions.  Hair and nails were normal.  NEUROLOGIC: The patient was alert and oriented to person, place, time, and circumstance. Speech was normal. Cranial nerves were normal. Motor strength was normal for age. The patient was normally coordinated.  PSYCHIATRIC:  Mood and affect were normal and the patient had normal recent and remote memory. The patient's judgment and insight were normal.          EKG: As above-normal sinus rhythm with right bundle branch block    Labs: Labs from May 15 will be included.  Recent Results (from the past 24 hour(s))   Electrocardiogram Perform and Read    Collection Time: 06/04/21  1:44 PM   Result Value Ref Range    SYSTOLIC BLOOD PRESSURE      DIASTOLIC BLOOD PRESSURE      VENTRICULAR RATE 82 BPM    ATRIAL RATE 82 BPM    P-R INTERVAL 168 ms    QRS DURATION 118 ms     Q-T INTERVAL 408 ms    QTC CALCULATION (BEZET) 476 ms    P Axis 39 degrees    R AXIS 64 degrees    T AXIS 35 degrees    MUSE DIAGNOSIS       Sinus rhythm with Premature atrial complexes  Right bundle branch block  Abnormal ECG  When compared with ECG of 06-JAN-2021 16:37,  Premature atrial complexes are now Present       Units 5/14/21 1341      WBC 4.0 - 11.0 thou/uL 6.3     RBC 3.80 - 5.40 mill/uL 4.85     Hemoglobin 12.0 - 16.0 g/dL 16.3High      Hematocrit 35.0 - 47.0 % 46.6     MCV 80 - 100 fL 96     MCH 27.0 - 34.0 pg 33.6     MCHC 32.0 - 36.0 g/dL 35.0     RDW 11.0 - 14.5 % 12.3     Platelets 140 - 440 thou/uL 214     MPV 7.0 - 10.0 fL 8.2    Resulting Agency  [facility]     Range & Units 5/14/21 1341      Sodium 136 - 145 mmol/L 143     Potassium 3.5 - 5.0 mmol/L 4.5     Chloride 98 - 107 mmol/L 105     CO2 22 - 31 mmol/L 29     Anion Gap, Calculation 5 - 18 mmol/L 9     Glucose 70 - 125 mg/dL 127High      Calcium 8.5 - 10.5 mg/dL 9.1     BUN 8 - 22 mg/dL 16     Creatinine 0.60 - 1.10 mg/dL 0.69     GFR MDRD Af Amer >60 mL/min/1.73m2 >60     GFR MDRD Non Af Amer >60 mL/min/1.73m2 >60    Resulting Agency             The visit lasted a total of 40  minutes -includes dictation time and review of chart with patient    Immunization History   Administered Date(s) Administered     COVID-19,PF,Moderna 02/27/2021, 03/27/2021     INFLUENZA,RECOMBINANT,INJ,PF QUADRIVALENT 18+YRS 12/19/2018     Influenza Z3x6-36, 02/23/2010     Influenza high dose,seasonal,PF, 65+ yrs 12/06/2019     Influenza, inj, historic,unspecified 12/04/2007, 12/10/2008, 12/10/2008, 02/23/2010, 09/17/2010, 09/07/2011, 11/14/2012, 10/16/2013, 10/01/2014, 10/27/2015, 09/06/2017, 12/06/2019     Influenza, seasonal,quad inj 6-35 mos 11/14/2012, 10/16/2013, 10/01/2014     Influenza,quad,high Dose,PF, 65yr + 10/02/2020     Influenza,seasonal,quad inj =/> 6months 10/27/2015, 09/06/2017     Pneumo Conj 13-V (2010&after) 12/10/2014     Pneumo Polysac 23-V  03/23/2000     Tdap 05/21/2008         Electronically signed by Loida Stockton MD 06/04/21 12:45 PM
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
## 2021-06-27 RX ORDER — LOSARTAN POTASSIUM 25 MG/1
25 TABLET ORAL DAILY
Qty: 90 TABLET | Refills: 0 | OUTPATIENT
Start: 2021-06-27
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
## 2021-06-29 NOTE — PROGRESS NOTES
"Progress Notes by Pamela Ayers LPN at 4/23/2020 10:40 AM     Author: Pamela Ayers LPN Service: -- Author Type: Licensed Nurse    Filed: 4/24/2020 12:38 PM Date of Service: 4/23/2020 10:40 AM Status: Signed    : Pamela Ayers LPN (Licensed Nurse)       Randi Damon is a 64 y.o. female who is being evaluated via a billable telephone visit.      The patient has been notified of following:     \"This telephone visit will be conducted via a call between you and your physician/provider. We have found that certain health care needs can be provided without the need for a physical exam.  This service lets us provide the care you need with a short phone conversation.  If a prescription is necessary we can send it directly to your pharmacy.  If lab work is needed we can place an order for that and you can then stop by our lab to have the test done at a later time.    Telephone visits are billed at different rates depending on your insurance coverage. During this emergency period, for some insurers they may be billed the same as an in-person visit.  Please reach out to your insurance provider with any questions.    If during the course of the call the physician/provider feels a telephone visit is not appropriate, you will not be charged for this service.\"    Patient has given verbal consent to a Telephone visit? Yes    Patient would like to receive their AVS by AVS Preference: La.      Patient is here for a follow up appointment with Dr. Dubois. Patient has bilateral shoulders pain, describes the pain as constant and throbbing,rates the pain as 3/10. Patient needs refills for Butrans. Functionality is 8. Patient states her pain interferes with her sleep, walking, working, ADL's, and social/relationships.         Pamela Ayers LPN           "

## 2021-06-29 NOTE — PROGRESS NOTES
"Progress Notes by Pamela Ayers LPN at 6/18/2020 11:20 AM     Author: Pamela Ayers LPN Service: -- Author Type: Licensed Nurse    Filed: 6/18/2020 11:43 AM Date of Service: 6/18/2020 11:20 AM Status: Sign when Signing Visit    : Pamela Ayers LPN (Licensed Nurse)       Randi Damon is a 64 y.o. female who is being evaluated via a billable telephone visit.      The patient has been notified of following:     \"This telephone visit will be conducted via a call between you and your physician/provider. We have found that certain health care needs can be provided without the need for a physical exam.  This service lets us provide the care you need with a short phone conversation.  If a prescription is necessary we can send it directly to your pharmacy.  If lab work is needed we can place an order for that and you can then stop by our lab to have the test done at a later time.    Telephone visits are billed at different rates depending on your insurance coverage. During this emergency period, for some insurers they may be billed the same as an in-person visit.  Please reach out to your insurance provider with any questions.    If during the course of the call the physician/provider feels a telephone visit is not appropriate, you will not be charged for this service.\"    Patient has given verbal consent to a Telephone visit? Yes    What phone number would you like to be contacted at? [phone]    Patient would like to receive their AVS by AVS Preference: La.    Patient is here for a follow up appointment with Dr. Dubois. Patient has bilateral shoulder pain, describes the pain as constant and sharp,rates the pain as 4/10. Patient needs refills for Butrans. CSA, REBEKAH, NDI and UDT are deferred due to COVID 19. Functionality is 6. Patient states their pain interferes with sleep, work and ADL's.        Pamela Ayers LPN       "

## 2021-06-30 NOTE — PROGRESS NOTES
"Progress Notes by Myhre, David J, RN at 12/29/2020  1:00 PM     Author: Myhre, David J, RN Service: -- Author Type: Registered Nurse    Filed: 12/29/2020  8:23 PM Encounter Date: 12/29/2020 Status: Signed    : Myhre, David J, RN (Registered Nurse)       Clinic Care Coordination Contact    Clinic Care Coordination Contact  OUTREACH    Referral Information:  Referral Source: PCP    Primary Diagnosis: Psychosocial    Chief Complaint   Patient presents with   ? Clinic Care Coordination - Initial        Universal Utilization:   Clinic Utilization  Difficulty keeping appointments:: No  Compliance Concerns: No  No-Show Concerns: No  No PCP office visit in Past Year: No  Utilization    Last refreshed: 12/29/2020  2:38 PM: Hospital Admissions 0           Last refreshed: 12/29/2020  2:38 PM: ED Visits 0           Last refreshed: 12/29/2020  2:38 PM: No Show Count (past year) 2              Current as of: 12/29/2020  2:38 PM              Clinical Concerns:  Current Medical Concerns:  Patient is a 65 year old woman with a history of severe major depression, Type 2 diabetes mellitus without complication, pulmonary hypertension, chronic intractable pain, anxiety, COPD, OAS, psoriasis, HTN, morbid obesity, history of breast cancer, hemochromatosis, hypothyroidism, vitamin D deficiency, vitamin B12 deficiency.  Patient reported she experienced an episode of serotonin syndrome in August of 2020. She stated at the time she felt she was \"out of control\", having episodes of rage and anger. She reported she was taken off of all of her psychiatric medications at that time and has slowly been working with her pain Clinic Provider and psychiatric provider, Dr. Dubois at getting back on them. Patient was recently prescribed Rilutek for her depression and anxiety and will begin taking this medications today. She stated she feels very comfortable working with Dr. Dubois.   Patient reported a history of chronic pain, although she " denied any today during assessment. She said she has been working closely with Dr. Dubois regarding her pain concerns. She said she is wary of trying new pain medications as she feels that past pain medications contributed to her episode of serotonin syndrome. Patient has a follow up with Dr. Dubois on 1/5/21.  Patient is scheduled for cataract surgery on 1/14/21. She has her pre-op physical with her PCP on 1/6/21. Patient because of her cataract surgery, she has concerns about driving and request assistance with applying for [employer] so she can get to her medical appointment. Writer will forward patient a [employer] application. She agreed to complete the patient section of the application and bring to her appointment with Dr. Stockton on 1/6/20 show she can fill out the physician portion of the application.   Current Behavioral Concerns: As noted above patient reported a history of depression and anxiety. She is currently seeing a therapist weekly, which se said has been beneficial to her. Patient has a goal to get her depression under better control in the next 6 months. She hopes to accomplish this by continuing to work with Dr. Dubois and her therapist Corine, taking her antidepressant medications, and participating in activities that she enjoys and brings meaning to her life. Patient denied any suicidal ideation during today's assessment.     Education Provided to patient: Discussed the importance of taking her medications as prescribed. Encouraged patient to attend all scheduled follow up appointments. Discussed safety plan if she ever felt unsafe or if she felt her mental health was unmanageable. Discussed the importance of washing her hands frequently, keeping her hands away from her face, masking in public and engaging in social distancing at this time related to the COVID-19 pandemic.   Pain  Pain (GOAL):: No  Health Maintenance Reviewed: Reviewed during the assessment. Patient will further  discuss with PCP at next office visit.      Medication Management:  Patient manages her medications independently. She stated she feels comfortable with this task and denied needing assistance medication set up at this time. She stated she is compliant with her medications.     Functional Status:  Dependent ADLs:: Ambulation-cane, Bathing( assists her with getting in and out of the bathtub)  Dependent IADLs:: Shopping, Cleaning, Laundry, Transportation( does the shopping, laundry, cleaning.)  Bed or wheelchair confined:: No  Mobility Status: Independent w/Device  Fallen 2 or more times in the past year?: No  Any fall with injury in the past year?: No  Denied needing any in-home services at this time.   Living Situation:  Current living arrangement:: I live in a private home with family  Type of residence:: Private home - \[facility] [place] [facility]\""    Lifestyle & Psychosocial Needs:        Diet:: Regular  Inadequate nutrition (GOAL):: No  Tube Feeding: No  Inadequate activity/exercise (GOAL):: Yes  Significant changes in sleep pattern (GOAL): No  Transportation means:: Family     Denominational or spiritual beliefs that impact treatment:: No  Mental health DX:: Yes  Mental health DX how managed:: Medication, Outpatient Counseling, Psychiatrist  Mental health management concern (GOAL):: No  Chemical Dependency Status: Not Applicable  Informal Support system:: Significant other, Friends   Socioeconomic History   ? Marital status:      Spouse name: Not on file   ? Number of children: Not on file   ? Years of education: Not on file   ? Highest education level: Not on file     Tobacco Use   ? Smoking status: Former Smoker     Quit date: 2003     Years since quittin.4   ? Smokeless tobacco: Never Used   Substance and Sexual Activity   ? Alcohol use: No   ? Drug use: No          Patient denied any financial concerns a this time.       Resources and Interventions:  Current Resources:      Community Resources:  None  Supplies Currently Used at Home: Nebulizer tubing  Equipment Currently Used at Home: cane, straight, grab bar, tub/shower, shower chair  Type of Employment: Retired, Disability   Denied need for additional DME at this time.        Advance Care Plan/Directive  Advanced Care Plans/Directives on file:: No  Advanced Care Plan/Directive Status: Declined Further Information    Referrals Placed: None     Goals:   Goals        General    Mental Health Management (pt-stated)     Notes - Note created  12/29/2020  7:35 PM by Myhre, David J, RN    Goal Statement: I would like to have my depression under better control in the next 6 months.   Date Goal set: 12/29/20  Barriers: Long history of depression  Strengths: Strong advocate for herself  Date to Achieve By: 5/29/21  Patient expressed understanding of goal: Yes  Action steps to achieve this goal:  1. I will continue to attend all scheduled appointments with Dr. Dubois and follow up on his recommendations.   2. I will take my antidepressant ordered by Dr. Dubois as prescribed.   3. I will continue to attend all scheduled appointments with my therapist, Corine.  4. I will consider returning to swimming at the [employer] as I know this helps me both physically and mentally.   5. I will continue to participate in activities that I enjoy and keeps me busy at home.   6. I will report progress towards this goals at scheduled outreach telephone calls from the CCC team.         Transportation (pt-stated)     Notes - Note edited  12/29/2020  7:23 PM by Myhre, David J, RN    Goal Statement- I would like to apply for [employer] within the next 6 months.  Date Goal set: 12/29/20  Barriers: Limited transportation options  Strengths: Strong advocate for herself.   Date to Achieve By: 5/29/21  Patient expressed understanding of goal: Yes  Action steps to achieve this goal  1.  [facility] will mail me the application and information for [employer].  2.  I will  review the application and complete the Certification Questionnaire section. I will bring the application to my appointment with Dr. Stockton on 1/6/21.  3.  Dr. Stockton will complete the Professional Verification Form section of the application and will coordinate mailing both sections in.  4. I will notify the Community Health Worker once I find out if I am approved or denied [employer].                  Patient/Caregiver understanding: Verbalized understanding of goals and other information discussed at today's assessment.        Future Appointments              In 1 week Dusty Dubois MD; [facility] [place]    In 1 week Loida Stockton MD [facility]    In 1 month Dusty Dubois MD; [facility]          Plan: CCC RN will continue monitor, send [employer] Application to patient, support patient with current goals and will be available as nursing needs arise. [facility] will support patient with current goals through scheduled outreach telephone calls.

## 2021-07-01 ENCOUNTER — VIRTUAL VISIT (OUTPATIENT)
Dept: PSYCHOLOGY | Facility: CLINIC | Age: 66
End: 2021-07-01
Payer: MEDICARE

## 2021-07-01 DIAGNOSIS — F33.1 MAJOR DEPRESSIVE DISORDER, RECURRENT EPISODE, MODERATE WITH ANXIOUS DISTRESS (H): Primary | ICD-10-CM

## 2021-07-01 DIAGNOSIS — F41.1 GAD (GENERALIZED ANXIETY DISORDER): ICD-10-CM

## 2021-07-01 PROCEDURE — 90837 PSYTX W PT 60 MINUTES: CPT | Mod: 95 | Performed by: SOCIAL WORKER

## 2021-07-01 NOTE — PROGRESS NOTES
Progress Note    Patient Name: Randi Cleary  Date: 7/1/21         Service Type: Individual      Session Start Time: 9:33 AM  Session End Time: 10:30 AM     Session Length: 57 minutes    Session #: 46    Attendees: Client attended alone    Service Modality:  Video Visit:    Telemedicine Visit: The patient's condition can be safely assessed and treated via synchronous audio and visual telemedicine encounter.      Reason for Telemedicine Visit: Services only offered telehealth    Originating Site (Patient Location): Patient's home    Distant Site (Provider Location): Provider Remote Setting- Home Office    Consent:  The patient/guardian has verbally consented to: the potential risks and benefits of telemedicine (video visit) versus in person care; bill my insurance or make self-payment for services provided; and responsibility for payment of non-covered services.     Mode of Communication:  Video Conference via [employer]    As the provider I attest to compliance with applicable laws and regulations related to telemedicine.      Treatment Plan Last Reviewed: 6/10/21  PHQ-9 / CHAVO-7 :   PHQ 5/21/2021 6/10/2021 6/23/2021   PHQ-9 Total Score 7 10 13   Q9: Thoughts of better off dead/self-harm past 2 weeks Not at all Not at all Not at all   F/U: Thoughts of suicide or self-harm - - -   F/U: Self harm-plan - - -   F/U: Self-harm action - - -   F/U: Safety concerns - - -   Some encounter information is confidential and restricted. Go to Review Flowsheets activity to see all data.     CHAVO-7 SCORE 5/12/2021 5/21/2021 6/10/2021   Total Score 9 (mild anxiety) 9 (mild anxiety) 11 (moderate anxiety)   Total Score 9 9 11   Some encounter information is confidential and restricted. Go to Review Flowsheets activity to see all data.         DATA  Interactive Complexity: No  Crisis: No       Progress Since Last Session (Related to Symptoms / Goals / Homework):   Symptoms: Patient is noticing  sebastian, being more productive, spending thruogh her money, and talking a lot     Homework: Achieved / completed to satisfaction   Continue to find small ways to find britney. -done     Episode of Care Goals: Satisfactory progress - ACTION (Actively working towards change); Intervened by reinforcing change plan / affirming steps taken     Current / Ongoing Stressors and Concerns:   Patient is currently socially isolated. She has a conflictual relationship with her .  She is getting minimal physical activity.  She had recent eye surgery     Treatment Objective(s) Addressed in This Session:   Patient will increase frequency of engaging her in ADLs.  Patient will track and record at least 5 pleasant exchanges with . Patient will be able to identify at least 5 positive traits about her .  Patient will reduce level of depressive and anxious features as evidenced by reduction in score on her CHAVO-7 and PHQ-9 (scores of 15 and 16 at first measurment, respectively).     Intervention:   CBT: Person-Centered Therapy: Patient discussed hope and ways she has been finding happiness. Discussed concerns about surgeries being delayed yet again another time. Also discussed some ongoing problems she is trying to manage.    Talked about emerging sebastian. Discussed symptoms of sebastian she was noticing, including taking on many projects, talking so much that she is later embarrassed, and spending money more than she knows she should. Discussed her history of being diagnosed with bipolar disorder. Discussed the best treatment being medications, also discussed the possibility of having depression reemerge. She agreed she'd talk with Dr. Bella about medications for mood stabilization. Also talked about ways to minimize the impact on sebastian on her life at the moment, largely focusing on the spending of the money. She agreed for now to give her credit card to her .       ASSESSMENT: Current Emotional / Mental Status (status  of significant symptoms):   Risk status (Self / Other harm or suicidal ideation)   Patient denies current fears or concerns for personal safety.   Patient denies current or recent suicidal ideation or behaviors.   Patient denies current or recent homicidal ideation or behaviors.   Patient denies current or recent self injurious behavior or ideation.   Patient denies other safety concerns.   Patient reports there has been no change in risk factors since their last session.     Patient reports there has been no change in protective factors since their last session.     A safety and risk management plan has been developed including: Patient consented to co-developed safety plan.  Safety and risk management plan was completed.  Patient agreed to use safety plan should any safety concerns arise.  A copy was given to the patient. This was done on 11/24/2020 and is attached to the bottom of the note.     Appearance:   Appropriate    Eye Contact:   Fair    Psychomotor Behavior: Normal    Attitude:   Cooperative    Orientation:   All   Speech    Rate / Production: Normal/ Responsive    Volume:  Normal    Mood:    potential sebastian, concerned/worried   Affect:    Appropriate    Thought Content:  Clear    Thought Form:  Coherent    Insight:    Fair      Medication Review:   No changes to current psychiatric medication(s)     Medication Compliance:   Yes     Changes in Health Issues:   None reported     Chemical Use Review:   Substance Use: Chemical use reviewed, no active concerns identified      Tobacco Use: No current tobacco use.      Diagnosis:  1. Major depressive disorder, recurrent episode, moderate with anxious distress (H)    2. CHAVO (generalized anxiety disorder)    R/O bipolar disorder  Collateral Reports Completed:   Not Applicable    PLAN: (Patient Tasks / Therapist Tasks / Other)  Give your credit card to your   Talk to Dr. Bella about medications    MICAELA SLADE    July 1, 2021                          "                                ______________________________________________________________________    Treatment Plan    Patient's Name: Randi Cleary  YOB: 1955    Date: 6/10/21    DSM5 Diagnoses: 296.32 (F33.1) Major Depressive Disorder, Recurrent Episode, Moderate With anxious distress  Psychosocial / Contextual Factors: Patient's entire family of origin has , she now has a sister-in-law and  as support.  Relationship with  is conflictual.  Patient is reporting increase in physical symptoms due to COVID-19.  Patient is off of pain medications.  WHODAS: 42    Referral / Collaboration:  Referral to another professional/service is not indicated at this time.     Anticipated number of session or this episode of care: 12-15      MeasurableTreatment Goal(s) related to diagnosis / functional impairment(s)  Goal 1: Patient will \"jumpstart, getting going with the things I need to be doing around the house as far as picking up, doing things, trying to do something every day.  Also to lessen the animosity between me and my .\"    I will know I've met my goal when my shoulders are fixed and I can see.      Objective #A (Patient Action)    Patient will increase frequency of engaging her in ADLs.  Status: Continued - Date(s): 6/10/21    Intervention(s)  Therapist will engage patient in CBT, specifically behavioral activation.    Objective #B  Patient will track and record at least 5 pleasant exchanges with . Patient will be able to identify at least 5 positive traits about her  and how he relates to her.  Status: Continued - Date(s): 6/10/21    Intervention(s)  Therapist will teach assertiveness skills and assign homework related to relationship interactions.    Objective #C  Patient will reduce level of depressive and anxious features as evidenced by reduction in score on her CHAVO-7 and PHQ-9 (scores of 15 and 16 at first measurment, respectively).  Status: Continued " "- Date(s):  6/10/21    Intervention(s)  Therapist will engage patient in person-centered therapy and CBT.    Patient has reviewed and agreed to the above plan.      MICAELA SLADE  Jenny 10, 2021                                                Randi Cleary          SAFETY PLAN:  Step 1: Warning signs / cues (Thoughts, images, mood, situation, behavior) that a crisis may be developing:  ? Thoughts: \"I don't want to continue\" \"I am unwanted\"  ? Images: none  ? Thinking Processes: ruminating  ? Mood: anger  ? Behaviors: isolating/withdrawing , can't stop crying, not taking care of myself and not taking care of my responsibilities  ? Situations: small triggers, such as not being able to find something, or dropping something   Step 2: Coping strategies - Things I can do to take my mind off of my problems without contacting another person (relaxation technique, physical activity):  ? Distress Tolerance Strategies:  arts and crafts: drawing, play with my pet , listen to positive and upbeat music: any, change body temperature (ice pack/cold water)  and paced breathing/progressive muscle relaxation  ? Physical Activities: go for a walk, deep breathing and stretching   ? Focus on helpful thoughts:  \"You've been through this before, you can get through it again.\"  Step 3: People and social settings that provide distraction:                 Name: Carmen                            Name: Darien                           Name: Aleida       ? pool, shopping, Carmen's house, [employer]       Step 4: Remind myself of people and things that are important to me and worth living for:  Sidney, Aleida Horton, post-COVID world, options of what could be in your future        Step 5: When I am in crisis, I can ask these people to help me use my safety plan:                 Name: Sidney  Step 6: Making the environment safe:   ? go to sleep/daydream  Step 7: Professionals or agencies I can contact during a crisis:  ? [facility] " Daytime Number: [phone]  ? Suicide Prevention Lifeline: 6-207-904-TALK (0921)  ? Crisis Text Line Service (available 24 hours a day, 7 days a week): Text [place] to 944186    Local Crisis Services: [facility] Crisis: [phone]     Call 911 or go to my nearest emergency department.       I helped develop this safety plan and agree to use it when needed.  I have been given a copy of this plan.       Client signature _________________________________________________________________  Today s date:  11/24/2020  Adapted from Safety Plan Template 2008 Randi Poole and Robby Barba is reprinted with the express permission of the authors.  No portion of the Safety Plan Template may be reproduced without the express, written permission.  You can contact the authors at bhs@[facility] or [email or web address].

## 2021-07-03 NOTE — ADDENDUM NOTE
Addendum Note by Matt Stockton MD at 5/28/2019  7:20 AM     Author: Matt Stockton MD Service: -- Author Type: Physician    Filed: 5/28/2019  3:52 PM Encounter Date: 5/28/2019 Status: Signed    : Matt Stockton MD (Physician)    Addended by: MATT STOCKTON on: 5/28/2019 03:52 PM        Modules accepted: Orders

## 2021-07-03 NOTE — ADDENDUM NOTE
Addendum Note by Chrissy Alcazar LPN at 7/30/2019  4:28 PM     Author: Chrissy Alcazar LPN Service: -- Author Type: Licensed Nurse    Filed: 7/30/2019  4:28 PM Encounter Date: 7/30/2019 Status: Signed    : Chrissy Alcazar LPN (Licensed Nurse)    Addended by: CHRISSY ALCAZAR on: 7/30/2019 04:28 PM        Modules accepted: Orders

## 2021-07-03 NOTE — ADDENDUM NOTE
Addendum Note by Rita Irvin at 12/1/2020  2:40 PM     Author: Rita Irvin Service: -- Author Type: --    Filed: 12/3/2020  5:41 AM Date of Service: 12/1/2020  2:40 PM Status: Signed    : Rita Irvin    Encounter addended by: Rita Irvin on: 12/3/2020  5:41 AM      Actions taken: Charge Capture section accepted

## 2021-07-03 NOTE — ADDENDUM NOTE
Addendum Note by Dusty Dubois MD at 8/24/2020  1:40 PM     Author: Dusty Dubois MD Service: -- Author Type: Physician    Filed: 8/24/2020  4:44 PM Date of Service: 8/24/2020  1:40 PM Status: Signed    : Dusty Dubois MD (Physician)    Encounter addended by: Dusty Dubois MD on: 8/24/2020  4:44 PM      Actions taken: Clinical Note Signed

## 2021-07-03 NOTE — ADDENDUM NOTE
Addendum Note by Reina Kimball CMA at 2/15/2019  8:05 AM     Author: Reina Kimball CMA Service: -- Author Type: Certified Medical Assistant    Filed: 2/15/2019  9:44 AM Date of Service: 2/15/2019  8:05 AM Status: Signed    : Reina Kimball CMA (Certified Medical Assistant)    Encounter addended by: Reina Kimball CMA on: 2/15/2019  9:44 AM      Actions taken: Visit Navigator Flowsheet section accepted, Sign   clinical note      
ambar

## 2021-07-03 NOTE — ADDENDUM NOTE
Addendum Note by Rita Irvin at 1/5/2021  7:40 AM     Author: Rita Irvin Service: -- Author Type: --    Filed: 1/12/2021  1:58 PM Date of Service: 1/5/2021  7:40 AM Status: Signed    : Rita Irvin    Encounter addended by: Rita Irvin on: 1/12/2021  1:58 PM      Actions taken: Charge Capture section accepted

## 2021-07-03 NOTE — ADDENDUM NOTE
Addendum Note by Matt Stockton MD at 3/29/2017  1:51 PM     Author: Matt Stockton MD Service: -- Author Type: Physician    Filed: 3/29/2017  1:51 PM Encounter Date: 3/29/2017 Status: Signed    : Matt Stockton MD (Physician)    Addended by: MATT STOCKTON on: 3/29/2017 01:51 PM        Modules accepted: Orders

## 2021-07-03 NOTE — ADDENDUM NOTE
Addendum Note by Matt Stockton MD at 8/7/2019  6:55 AM     Author: Matt Stockton MD Service: -- Author Type: Physician    Filed: 8/7/2019  6:55 AM Encounter Date: 8/2/2019 Status: Signed    : Matt Stockton MD (Physician)    Addended by: MATT STOCKTON on: 8/7/2019 06:55 AM        Modules accepted: Orders

## 2021-07-03 NOTE — ADDENDUM NOTE
Addendum Note by Matt Stockton MD at 5/12/2017  1:58 PM     Author: Matt Stockton MD Service: -- Author Type: Physician    Filed: 5/12/2017  1:58 PM Encounter Date: 5/12/2017 Status: Signed    : Matt Stockton MD (Physician)    Addended by: MATT STOCKTON on: 5/12/2017 01:58 PM        Modules accepted: Orders

## 2021-07-03 NOTE — ADDENDUM NOTE
Addendum Note by Matt Stockton MD at 4/9/2019  9:40 AM     Author: Matt Stockton MD Service: -- Author Type: Physician    Filed: 4/9/2019 10:18 AM Encounter Date: 4/9/2019 Status: Signed    : Matt Stockton MD (Physician)    Addended by: MATT STOCKTON on: 4/9/2019 10:18 AM        Modules accepted: Level of Service

## 2021-07-03 NOTE — ADDENDUM NOTE
Addendum Note by Pamela Ayers LPN at 9/18/2020  3:20 PM     Author: Pamela Ayers LPN Service: -- Author Type: Licensed Nurse    Filed: 9/21/2020  8:52 AM Date of Service: 9/18/2020  3:20 PM Status: Signed    : Pamela Ayers LPN (Licensed Nurse)    Encounter addended by: Pamela Ayers LPN on: 9/21/2020  8:52 AM      Actions taken: Charge Capture section accepted

## 2021-07-03 NOTE — ADDENDUM NOTE
Addendum Note by Matt Stockton MD at 1/30/2018  4:04 PM     Author: Matt Stockton MD Service: -- Author Type: Physician    Filed: 1/30/2018  4:04 PM Encounter Date: 1/30/2018 Status: Signed    : Matt Stockton MD (Physician)    Addended by: MATT STOCKTON on: 1/30/2018 04:04 PM        Modules accepted: Orders

## 2021-07-03 NOTE — ADDENDUM NOTE
Addendum Note by Consuelo Little CNP at 5/14/2018 11:59 PM     Author: Consuelo Little CNP Service: -- Author Type: Nurse Practitioner    Filed: 5/29/2018  8:08 AM Date of Service: 5/14/2018 11:59 PM Status: Signed    : Consuelo Little CNP (Nurse Practitioner)    Encounter addended by: Consuelo Little CNP on: 5/29/2018  8:08 AM<BR>     Actions taken: Sign clinical note

## 2021-07-03 NOTE — ADDENDUM NOTE
Addendum Note by Rita Irvin at 8/24/2020  1:40 PM     Author: Rita Irvin Service: -- Author Type: --    Filed: 8/26/2020  8:42 AM Date of Service: 8/24/2020  1:40 PM Status: Signed    : Rita Irvin    Encounter addended by: Rita Irvin on: 8/26/2020  8:42 AM      Actions taken: Charge Capture section accepted

## 2021-07-03 NOTE — ADDENDUM NOTE
Addendum Note by Matt Stockton MD at 6/11/2019  5:33 PM     Author: Matt Stockton MD Service: -- Author Type: Physician    Filed: 6/11/2019  5:33 PM Encounter Date: 6/10/2019 Status: Signed    : Matt Stockton MD (Physician)    Addended by: MATT STOCKTON on: 6/11/2019 05:33 PM        Modules accepted: Orders

## 2021-07-03 NOTE — ADDENDUM NOTE
Addendum Note by Ayush Lechuga CMA at 10/13/2020  9:50 AM     Author: Ayush Lechuga CMA Service: -- Author Type: Certified Medical Assistant    Filed: 10/13/2020  9:50 AM Encounter Date: 10/7/2020 Status: Signed    : Ayush Lechuga CMA (Certified Medical Assistant)    Addended by: AYUSH LECHUGA on: 10/13/2020 09:50 AM        Modules accepted: Orders

## 2021-07-03 NOTE — ADDENDUM NOTE
Addendum Note by Milagro Mendoza RN at 6/15/2020  2:07 PM     Author: Milagro Mendoza RN Service: -- Author Type: Registered Nurse    Filed: 6/15/2020  2:07 PM Encounter Date: 6/13/2020 Status: Signed    : Milagro Mendoza RN (Registered Nurse)    Addended by: MILAGRO MENDOZA on: 6/15/2020 02:07 PM        Modules accepted: Orders

## 2021-07-03 NOTE — ADDENDUM NOTE
Addendum Note by Fran Delgado RT (R) at 9/4/2020 12:00 PM     Author: Fran Delgado RT (R) Service: -- Author Type: Radiologic Technologist    Filed: 9/4/2020  1:29 PM Encounter Date: 9/4/2020 Status: Signed    : Fran Delgado RT (R) (Radiologic Technologist)    Addended by: FRAN DELGADO on: 9/4/2020 01:29 PM        Modules accepted: Orders

## 2021-07-03 NOTE — ADDENDUM NOTE
Addendum Note by Marcelle Palafox CMA at 8/6/2019  4:48 PM     Author: Marcelle Palafox CMA Service: -- Author Type: Certified Medical Assistant    Filed: 8/6/2019  4:48 PM Encounter Date: 8/2/2019 Status: Signed    : Marcelle Palafox CMA (Certified Medical Assistant)    Addended by: MARCELLE PALAFOX on: 8/6/2019 04:48 PM        Modules accepted: Orders

## 2021-07-04 NOTE — ADDENDUM NOTE
Addendum Note by Rita Irivn at 2/2/2021  3:20 PM     Author: Rita Irvin Service: -- Author Type: --    Filed: 2/4/2021  6:09 AM Date of Service: 2/2/2021  3:20 PM Status: Signed    : Rita Irvin    Encounter addended by: Rita Irvin on: 2/4/2021  6:09 AM      Actions taken: Charge Capture section accepted

## 2021-07-04 NOTE — ADDENDUM NOTE
Addendum Note by Rita Irvin at 5/20/2021  9:40 AM     Author: Rita Irvin Service: -- Author Type: --    Filed: 5/24/2021  7:14 AM Date of Service: 5/20/2021  9:40 AM Status: Signed    : Rita Irvin    Encounter addended by: Rita Irvin on: 5/24/2021  7:14 AM      Actions taken: Charge Capture section accepted

## 2021-07-04 NOTE — LETTER
Letter by Cari Denise CHW at      Author: Cari Denise CHW Service: -- Author Type: --    Filed:  Encounter Date: 6/1/2021 Status: (Other)         June 1, 2021            Randi Damon  [address] [facility] [address]        Dear Randi,    After you enrolled with Clinic Care Coordination we discussed how as a Community Health Worker I work to keep you connected to your provider and care team, we both agreed that in order to best help give you the access, support and resources you need to reach your goals; and most importantly, for you to get and stay healthy, we would connect monthly.    I have been unable to reach you for 1 month.      I am writing to ask you, a family member or your representative to call me at [phone].  If you reach my voicemail, please leave a message with your daytime telephone number and a date and time that I can call you.      Please call me as soon as you receive this letter.  I look forward to speaking with you.          Sincerely,  Aparna SANDERS   Community Health Worker  [phone]

## 2021-07-04 NOTE — ADDENDUM NOTE
Addendum Note by Rita Irvin at 4/7/2021  3:00 PM     Author: Rita Irvin Service: -- Author Type: --    Filed: 4/12/2021  8:43 AM Date of Service: 4/7/2021  3:00 PM Status: Signed    : Rita Irvin    Encounter addended by: Rita Irvin on: 4/12/2021  8:43 AM      Actions taken: Charge Capture section accepted

## 2021-07-04 NOTE — ADDENDUM NOTE
Addendum Note by Rita Irvin at 3/2/2021 12:40 PM     Author: Rita Irvin Service: -- Author Type: --    Filed: 3/16/2021 10:40 AM Date of Service: 3/2/2021 12:40 PM Status: Signed    : Rita Irvin    Encounter addended by: Rita Irvin on: 3/16/2021 10:40 AM      Actions taken: Charge Capture section accepted

## 2021-07-04 NOTE — ADDENDUM NOTE
Addendum Note by Matt Stockton MD at 3/12/2021 12:40 PM     Author: Mtat Stockton MD Service: -- Author Type: Physician    Filed: 3/13/2021 11:23 AM Encounter Date: 3/12/2021 Status: Signed    : Matt Stockton MD (Physician)    Addended by: MATT STOCKTON on: 3/13/2021 11:23 AM        Modules accepted: Orders

## 2021-07-06 VITALS
BODY MASS INDEX: 35.05 KG/M2 | WEIGHT: 218.1 LBS | RESPIRATION RATE: 20 BRPM | OXYGEN SATURATION: 95 % | HEIGHT: 66 IN | HEART RATE: 80 BPM | SYSTOLIC BLOOD PRESSURE: 98 MMHG | DIASTOLIC BLOOD PRESSURE: 58 MMHG

## 2021-07-13 ENCOUNTER — RECORDS - HEALTHEAST (OUTPATIENT)
Dept: ADMINISTRATIVE | Facility: CLINIC | Age: 66
End: 2021-07-13

## 2021-07-13 ENCOUNTER — ONCOLOGY VISIT (OUTPATIENT)
Dept: ONCOLOGY | Facility: CLINIC | Age: 66
End: 2021-07-13
Attending: INTERNAL MEDICINE
Payer: MEDICARE

## 2021-07-13 ENCOUNTER — LAB (OUTPATIENT)
Dept: LAB | Facility: CLINIC | Age: 66
End: 2021-07-13
Payer: MEDICARE

## 2021-07-13 VITALS
HEIGHT: 65 IN | TEMPERATURE: 98.3 F | DIASTOLIC BLOOD PRESSURE: 67 MMHG | WEIGHT: 226.6 LBS | OXYGEN SATURATION: 97 % | SYSTOLIC BLOOD PRESSURE: 108 MMHG | HEART RATE: 85 BPM | BODY MASS INDEX: 37.75 KG/M2

## 2021-07-13 DIAGNOSIS — E83.119 HEMOCHROMATOSIS, UNSPECIFIED HEMOCHROMATOSIS TYPE: ICD-10-CM

## 2021-07-13 DIAGNOSIS — E83.119 HEMOCHROMATOSIS, UNSPECIFIED HEMOCHROMATOSIS TYPE: Primary | ICD-10-CM

## 2021-07-13 DIAGNOSIS — R79.0 LOW FERRITIN: ICD-10-CM

## 2021-07-13 DIAGNOSIS — D75.1 POLYCYTHEMIA, SECONDARY: ICD-10-CM

## 2021-07-13 DIAGNOSIS — E61.1 IRON DEFICIENCY: ICD-10-CM

## 2021-07-13 DIAGNOSIS — G47.33 OSA (OBSTRUCTIVE SLEEP APNEA): ICD-10-CM

## 2021-07-13 LAB
ALBUMIN SERPL-MCNC: 3.4 G/DL (ref 3.4–5)
ALP SERPL-CCNC: 73 U/L (ref 40–150)
ALT SERPL W P-5'-P-CCNC: 37 U/L (ref 0–50)
ANION GAP SERPL CALCULATED.3IONS-SCNC: 5 MMOL/L (ref 3–14)
AST SERPL W P-5'-P-CCNC: 24 U/L (ref 0–45)
BASE EXCESS BLDV CALC-SCNC: 2.8 MMOL/L (ref -7.7–1.9)
BASOPHILS # BLD AUTO: 0 10E3/UL (ref 0–0.2)
BASOPHILS NFR BLD AUTO: 1 %
BILIRUB SERPL-MCNC: 0.6 MG/DL (ref 0.2–1.3)
BUN SERPL-MCNC: 7 MG/DL (ref 7–30)
CALCIUM SERPL-MCNC: 8.7 MG/DL (ref 8.5–10.1)
CHLORIDE BLD-SCNC: 109 MMOL/L (ref 94–109)
CO2 SERPL-SCNC: 28 MMOL/L (ref 20–32)
CREAT SERPL-MCNC: 0.56 MG/DL (ref 0.52–1.04)
EOSINOPHIL # BLD AUTO: 0.2 10E3/UL (ref 0–0.7)
EOSINOPHIL NFR BLD AUTO: 2 %
ERYTHROCYTE [DISTWIDTH] IN BLOOD BY AUTOMATED COUNT: 12.3 % (ref 10–15)
ERYTHROCYTE [SEDIMENTATION RATE] IN BLOOD BY WESTERGREN METHOD: 9 MM/HR (ref 0–30)
FERRITIN SERPL-MCNC: 44 NG/ML (ref 8–252)
GFR SERPL CREATININE-BSD FRML MDRD: >90 ML/MIN/1.73M2
GLUCOSE BLD-MCNC: 92 MG/DL (ref 70–99)
HCO3 BLDV-SCNC: 28 MMOL/L (ref 21–28)
HCT VFR BLD AUTO: 44.8 % (ref 35–47)
HGB BLD-MCNC: 15.8 G/DL (ref 11.7–15.7)
IMM GRANULOCYTES # BLD: 0 10E3/UL
IMM GRANULOCYTES NFR BLD: 0 %
INTERPRETATION: NORMAL
IRON SATN MFR SERPL: 62 % (ref 15–46)
IRON SERPL-MCNC: 165 UG/DL (ref 35–180)
LDH SERPL L TO P-CCNC: 256 U/L (ref 81–234)
LYMPHOCYTES # BLD AUTO: 1.4 10E3/UL (ref 0.8–5.3)
LYMPHOCYTES NFR BLD AUTO: 20 %
MCH RBC QN AUTO: 34.2 PG (ref 26.5–33)
MCHC RBC AUTO-ENTMCNC: 35.3 G/DL (ref 31.5–36.5)
MCV RBC AUTO: 97 FL (ref 78–100)
MDL NUMBER: NORMAL
MONOCYTES # BLD AUTO: 0.5 10E3/UL (ref 0–1.3)
MONOCYTES NFR BLD AUTO: 8 %
NEUTROPHILS # BLD AUTO: 4.8 10E3/UL (ref 1.6–8.3)
NEUTROPHILS NFR BLD AUTO: 69 %
NRBC # BLD AUTO: 0 10E3/UL
NRBC BLD AUTO-RTO: 0 /100
O2/TOTAL GAS SETTING VFR VENT: 0 %
PCO2 BLDV: 46 MM HG (ref 40–50)
PH BLDV: 7.4 [PH] (ref 7.32–7.43)
PLATELET # BLD AUTO: 194 10E3/UL (ref 150–450)
PO2 BLDV: 31 MM HG (ref 25–47)
POTASSIUM BLD-SCNC: 3.6 MMOL/L (ref 3.4–5.3)
PROT SERPL-MCNC: 7 G/DL (ref 6.8–8.8)
RBC # BLD AUTO: 4.62 10E6/UL (ref 3.8–5.2)
RETICS # AUTO: 0.11 10E6/UL (ref 0.03–0.1)
RETICS/RBC NFR AUTO: 2.4 % (ref 0.5–2)
SODIUM SERPL-SCNC: 142 MMOL/L (ref 133–144)
TIBC SERPL-MCNC: 265 UG/DL (ref 240–430)
WBC # BLD AUTO: 7 10E3/UL (ref 4–11)

## 2021-07-13 PROCEDURE — 82728 ASSAY OF FERRITIN: CPT | Performed by: PATHOLOGY

## 2021-07-13 PROCEDURE — 85652 RBC SED RATE AUTOMATED: CPT | Performed by: PATHOLOGY

## 2021-07-13 PROCEDURE — 83550 IRON BINDING TEST: CPT | Performed by: PATHOLOGY

## 2021-07-13 PROCEDURE — 82668 ASSAY OF ERYTHROPOIETIN: CPT | Mod: 90 | Performed by: PATHOLOGY

## 2021-07-13 PROCEDURE — 36415 COLL VENOUS BLD VENIPUNCTURE: CPT | Performed by: PATHOLOGY

## 2021-07-13 PROCEDURE — G0463 HOSPITAL OUTPT CLINIC VISIT: HCPCS

## 2021-07-13 PROCEDURE — 99215 OFFICE O/P EST HI 40 MIN: CPT | Performed by: INTERNAL MEDICINE

## 2021-07-13 PROCEDURE — 80053 COMPREHEN METABOLIC PANEL: CPT | Performed by: PATHOLOGY

## 2021-07-13 PROCEDURE — 99417 PROLNG OP E/M EACH 15 MIN: CPT | Performed by: INTERNAL MEDICINE

## 2021-07-13 PROCEDURE — 82803 BLOOD GASES ANY COMBINATION: CPT | Performed by: PATHOLOGY

## 2021-07-13 PROCEDURE — 83615 LACTATE (LD) (LDH) ENZYME: CPT | Performed by: PATHOLOGY

## 2021-07-13 PROCEDURE — 85045 AUTOMATED RETICULOCYTE COUNT: CPT | Performed by: PATHOLOGY

## 2021-07-13 PROCEDURE — 85025 COMPLETE CBC W/AUTO DIFF WBC: CPT | Performed by: PATHOLOGY

## 2021-07-13 RX ORDER — ERGOCALCIFEROL 1.25 MG/1
50000 CAPSULE, LIQUID FILLED ORAL WEEKLY
COMMUNITY
End: 2021-07-22

## 2021-07-13 RX ORDER — MULTIVITAMIN WITH IRON
1 TABLET ORAL
COMMUNITY
End: 2023-11-01

## 2021-07-13 ASSESSMENT — PAIN SCALES - GENERAL: PAINLEVEL: EXTREME PAIN (8)

## 2021-07-13 ASSESSMENT — MIFFLIN-ST. JEOR: SCORE: 1556.85

## 2021-07-13 NOTE — NURSING NOTE
"Oncology Rooming Note    July 13, 2021 2:06 PM   Randi Damon is a 65 year old female who presents for:    Chief Complaint   Patient presents with     Oncology Clinic Visit     Hemochromatosis, unspecified      Initial Vitals: /67   Pulse 85   Temp 98.3  F (36.8  C)   Ht 1.64 m (5' 4.57\")   Wt 102.8 kg (226 lb 9.6 oz)   SpO2 97%   BMI 38.22 kg/m   Estimated body mass index is 38.22 kg/m  as calculated from the following:    Height as of this encounter: 1.64 m (5' 4.57\").    Weight as of this encounter: 102.8 kg (226 lb 9.6 oz). Body surface area is 2.16 meters squared.  Extreme Pain (8) Comment: Data Unavailable   No LMP recorded. Patient is postmenopausal.  Allergies reviewed: Yes  Medications reviewed: Yes    Medications: Medication refills not needed today.  Pharmacy name entered into [employer]:    [id number] [place], [place] - [address] [facility] - [place], [place] - [address]    Clinical concerns: Interested in her iron count       Lito Cook MA            "

## 2021-07-13 NOTE — PROGRESS NOTES
[facility] Hematology Consultation    Outpatient Visit Note:    Patient: Randi Damon  MRN: [id number]  : 1955  YUAN: 2021    Reason for Consultation:  Two copies of the C282Y mutation - hereditary hemochromatosis    Assessment:  In summary, Randi Damon is a 65 year old woman with hereditary hemochromatosis and Past Medical History of KYAW, major depressive disorder, serotonin syndrome, pheochromocytoma,     1. Hereditary hemochromatosis  2. Restless leg syndrome  3. Major depressive disorder  4. Obstructive sleep apnea  5. Polycythemia, likely secondary to to #4    For hereditary hemochromatosis we typically try to remove excess iron by doing serial phlebotomy to reduce iron (ferritin) to goal of 50. However, in Ms. Damon's case, she has severe restless leg and fatigue therefore would permit for target ferritin of 125. Would NOT allow for ferritin to exceed 250 in this patient.     Ms. Damon has polycythemia. This is NOT related to HH, as iron status is regulated separate from this. This is likely secondary to her untreated KYAW, however if she were to develop leukocytosis would consider sending evaluation for MPN.     Ms. Damon is currently seeing a psychologist. She has a history of substance abuse (alcohol) and endorses concern that she may be relapsing in her behavior. Discussed resources including her current providers, AA, etc. Will refer to integrated primary care ([facility]) for second opininon and options related to substance avoidance, etc      Plan:  1. Majority of today's visit was spent counseling the patient regarding iron status, etc.  2.   Orders Placed This Encounter   Procedures     Ferritin     Iron and iron binding capacity     Comprehensive metabolic panel     Blood gas venous     Ferritin     Iron and iron binding capacity     Internal Medicine Referral   3. Encourage use of CPAP      The patient is given our center's contact  information and is instructed to call if she should have any further questions or concerns.  Otherwise, we will plan on seeing her back Follow up with Provider - labs in 3 months and follow up with provider with labs in 6 months .      Total Time Spent:  I spent a total of 45 minutes face-to-face with Randi Damon during today's office visit.  Over 50% of this time was spent counseling the patient and/or coordinating care regarding iron, nutrition and psychologic issues, additional 30 minutes was spent in evaluating patient's chart as she was a transfer of care. Total time spent: 80 minutes    Tova Pablo MD/PhD   of Medicine  [facility] [employer]   ----------------------------------------------------------------------------------------------------------------------    History of Present Illness:  Randi Damon is a 65 year old woman who is homozygous for Hereditary hemochromatosis C282Y mutation with complex medical history who presents to establish care in my clinic after her previous hematologist, Dr. Hill has relocated out of [facility].     Winnie reports today that she is continuing to recover from her serotonin syndrome episodes. Received colonoscopy without issues. Is trying to get back on top of health and is doing more gardening, etc. Continues to have pain in her shoulders. Is working to maintain her >60 lb weight loss, but some has come back on. Currently gardening. Endorses some mild sebastian symptoms at present- which she prefers over depression. RLS symptoms stable.     Past Medical History:  Past Medical History:   Diagnosis Date     Anxiety      Anxiety      Bipolar disorder (H)      Breast cancer (H) 1986    lumpectomy, radiation, chemo     Breast cancer (H) 1994     Chronic pain syndrome      COPD (chronic obstructive pulmonary disease) (H)     asthma     COPD (chronic obstructive pulmonary disease) (H)      Depression      Depression, major,  recurrent (H)      Drug tolerance     opioid     Esophageal reflux      Esophageal reflux      Fatigue      Graves disease 1994     Graves disease      Hemochromatosis 02/14/2018    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; H63D not detected     Hemochromatosis      Hx antineoplastic chemotherapy      Hx of radiation therapy      Hyperlipidaemia      Hypertension      Hypertension      Impaired fasting glucose 2017     Impaired fasting glucose      Joint pain      Morbid obesity (H)      KYAW (obstructive sleep apnea) 2016     Osteopenia      Pheochromocytoma      Pheochromocytoma, left 08/02/2017    laparoscopically removed     Postablative hypothyroidism 1995     Prediabetes 10/03/2019    by A1c     Psoriasis      Psoriasis      Psoriatic arthropathy (H)      Psoriatic arthropathy (H)      Restless leg syndrome      Right rotator cuff tear      RLS (restless legs syndrome)     on ropinorole     Sacroiliitis (H)      Serotonin syndrome 08/28/2020    [facility]     Serotonin syndrome 8/28/2020     Sleep apnea 2017    CPAP     Snoring      Spinal stenosis      Spinal stenosis      Urinary incontinence      Vitamin B 12 deficiency 2009     Vitamin B12 deficiency      Vitamin D deficiency 2010       Past Surgical History:  Past Surgical History:   Procedure Laterality Date     ARTHRODESIS ANKLE       ARTHROPLASTY ANKLE Right 6/29/2015    Procedure: ARTHROPLASTY ANKLE;  Surgeon: Jason Coughlin MD;  Location: [facility]     ARTHROPLASTY REVISION ANKLE Right 6/29/2015    Procedure: ARTHROPLASTY REVISION ANKLE;  Surgeon: Jason Coughlin MD;  Location: [facility]     BIOPSY BREAST       BREAST BIOPSY, CORE RT/LT       C ARTHRODESIS,ANKLE,OPEN Right     Description: Ankle Arthrodesis;  Recorded: 04/07/2010;     COLONOSCOPY       COLONOSCOPY N/A 2/25/2021    Procedure: COLONOSCOPY;  Surgeon: Guru Elke Tolbert MD;  Location: [facility]     CV CORONARY ANGIOGRAM N/A 10/3/2019    Procedure: CV CORONARY ANGIOGRAM;  Surgeon:  Bryce Pierre MD;  Location:  HEART CARDIAC CATH LAB     CV RIGHT HEART CATH MEASUREMENTS RECORDED N/A 10/3/2019    Procedure: CV RIGHT HEART CATH;  Surgeon: Bryce Pierre MD;  Location:  HEART CARDIAC CATH LAB     ESOPHAGOSCOPY, GASTROSCOPY, DUODENOSCOPY (EGD), COMBINED N/A 2/25/2021    Procedure: ESOPHAGOGASTRODUODENOSCOPY, WITH BIOPSY;  Surgeon: Guru Elke Tolbert MD;  Location:  GI     HC REMOVE TONSILS/ADENOIDS,<13 Y/O      Description: Tonsillectomy With Adenoidectomy;  Recorded: 04/07/2010;     IR LUMBAR EPIDURAL STEROID INJECTION  10/26/2004     IR LUMBAR EPIDURAL STEROID INJECTION  11/16/2004     IR LUMBAR EPIDURAL STEROID INJECTION  12/21/2004     IR LUMBAR EPIDURAL STEROID INJECTION  6/8/2006     JOINT REPLACEMENT       LAPAROSCOPIC ADRENALECTOMY Left 08/02/2017    pheochromocytoma     LAPAROSCOPIC ADRENALECTOMY Left 8/2/2017    Procedure: LAPAROSCOPIC LEFT ADRENALECTOMY, ;  Surgeon: Gab Linares MD;  Location: [facility];  Service:      LENGTHEN TENDON ACHILLES Right 6/29/2015    Procedure: LENGTHEN TENDON ACHILLES;  Surgeon: Jason Coughlin MD;  Location: [facility]     LUMPECTOMY BREAST       LUMPECTOMY BREAST Left 1994     MAMMOPLASTY REDUCTION Right 01/13/2015    [place]     MAMMOPLASTY REDUCTION Right     approx late [id number]     MASTECTOMY      left lumpectomy with axillary node dissection     MASTECTOMY MODIFIED RADICAL       OTHER SURGICAL HISTORY Right     reconstructive breast surgery     OTHER SURGICAL HISTORY      Adrenalectomy for pheochromocytoma     AK MASTECTOMY, MODIFIED RADICAL      Description: Modified Radical Mastectomy Left Breast;  Recorded: 04/07/2010;     REPAIR HAMMER TOE Right 6/29/2015    Procedure: REPAIR HAMMER TOE;  Surgeon: Jason Coughlin MD;  Location: [facility]     TONSILLECTOMY       TONSILLECTOMY & ADENOIDECTOMY         Medications:  Current Outpatient Medications   Medication Sig Dispense Refill     albuterol  "(ACCUNEB) 0.63 MG/3ML neb solution Take 1 ampule by nebulization every 6 hours as needed for shortness of breath / dyspnea or wheezing       albuterol (PROAIR HFA/PROVENTIL HFA/VENTOLIN HFA) 108 (90 Base) MCG/ACT inhaler Inhale 2 puffs into the lungs every 4 hours as needed for shortness of breath / dyspnea or wheezing 1 Inhaler 11     albuterol (PROVENTIL) (2.5 MG/3ML) 0.083% neb solution Take 1 vial (2.5 mg) by nebulization every 4 hours as needed for shortness of breath / dyspnea or wheezing 360 mL 5     ASPIRIN PO Take 81 mg by mouth 2 times daily        Cholecalciferol (VITAMIN D3) 250 MCG (38383 UT) TABS Take 1 tablet by mouth daily 34301/day       Cyanocobalamin (VITAMIN B-12) 5000 MCG SUBL Unknown dose. 2 or 3 sprays/day       diclofenac (VOLTAREN) 1 % topical gel Place onto the skin 4 times daily       Fluticasone-Umeclidin-Vilanterol (TRELEGY ELLIPTA) 100-62.5-25 MCG/INH oral inhaler Inhale 1 puff into the lungs daily 1 each 5     furosemide (LASIX) 20 MG tablet Take 2 tablets (40 mg) by mouth daily 180 tablet 2     losartan (COZAAR) 25 MG tablet Take 1 tablet (25 mg) by mouth daily 90 tablet 3     Omeprazole (PRILOSEC PO) Take 20 mg by mouth 2 times daily (before meals)       potassium chloride ER (KLOR-CON M) 20 MEQ CR tablet Take 1 tablet (20 mEq) by mouth daily 90 tablet 3     rOPINIRole (REQUIP) 2 MG tablet        SYNTHROID 150 MCG tablet Take 1 tablet (150 mcg) by mouth daily 90 tablet 4        Allergies:  Allergies   Allergen Reactions     Buspirone      The patient states she had serotonin syndrome     Desvenlafaxine      Serotonin syndrome     Diclofenac Sodium [Diclofenac]      Serotonin syndrome and restless legs syndrome     Gabapentin      Drove on the wrong side of the highway     Levofloxacin      \"CAN'T REMEMBER\"     Penicillins      \"SORES IN MOUTH\"     Riluzole Difficulty breathing and Swelling     Sulfa Drugs      \"PT DOES NOT KNOW WHAT THE REACTION WAS\"       ROS:  A 10 point ROS is " "negative except as stated in the HPI    Social History:  Denies any tobacco use. Currently drinking 1/2 to 1 bottle of wine daily- new since ~7/4/21. Would like to quit. Denies any illicit drug use.     Family History:  Reviewed- no interval updates    Objective:  /67   Pulse 85   Temp 98.3  F (36.8  C)   Ht 1.64 m (5' 4.57\")   Wt 102.8 kg (226 lb 9.6 oz)   SpO2 97%   BMI 38.22 kg/m      Exam:   Constitutional: obese woman, appears older than stated age.   HEENT: Pupils equal and reactive to light. No scleral icterus or hemorrhage. Due to COVID precautions mask in place  CV: regular rate and rhythm, no murmurs  Respiratory: clear  GI:obese, unable to discern HSM  Mus/Skele: no edema  Skin: no petechiae, no ecchymosis.  Neuro: CN II-XII intact. Normal gait. AOx3  Psych: talkative, bright. Tangential.  Heme/Lymph: no supraclavicular, axillary or umbilical adenopathy.     Labs: personally reviewed with relevant trends annotated below  Ferritin   Date Value Ref Range Status   07/13/2021 44 8 - 252 ng/mL Final   05/14/2021 66 10 - 130 ng/mL Final   03/12/2021 29 10 - 130 ng/mL Final   02/05/2021 48 8 - 252 ng/mL Final   01/06/2021 50 10 - 130 ng/mL Final   10/30/2020 20 8 - 252 ng/mL Final   08/03/2020 24 10 - 130 ng/mL Final   02/12/2020 19 8 - 252 ng/mL Final   09/24/2019 33 8 - 252 ng/mL Final   09/19/2019 36 8 - 252 ng/mL Final   09/13/2019 42 8 - 252 ng/mL Final     Iron   Date Value Ref Range Status   07/13/2021 165 35 - 180 ug/dL Final   02/05/2021 141 35 - 180 ug/dL Final   01/06/2021 78 42 - 175 ug/dL Final   10/30/2020 52 35 - 180 ug/dL Final   08/03/2020 51 42 - 175 ug/dL Final   09/24/2019 92 35 - 180 ug/dL Final   08/13/2019 237 (H) 35 - 180 ug/dL Final   02/14/2018 149 42 - 175 ug/dL Final     WBC   Date Value Ref Range Status   02/05/2021 6.9 4.0 - 11.0 10e9/L Final     WBC Count   Date Value Ref Range Status   07/13/2021 7.0 4.0 - 11.0 10e3/uL Final     Hemoglobin   Date Value Ref Range Status "   07/13/2021 15.8 (H) 11.7 - 15.7 g/dL Final   05/14/2021 16.3 (H) 12.0 - 16.0 g/dL Final   03/12/2021 16.3 (H) 12.0 - 16.0 g/dL Final   02/05/2021 17.7 (H) 11.7 - 15.7 g/dL Final   01/06/2021 17.2 (H) 12.0 - 16.0 g/dL Final   10/30/2020 15.4 11.7 - 15.7 g/dL Final   08/03/2020 13.5 12.0 - 16.0 g/dL Final   07/17/2020 14.6 12.0 - 16.0 g/dL Final   03/09/2020 14.3 11.7 - 15.7 g/dL Final   02/12/2020 14.0 11.7 - 15.7 g/dL Final   10/03/2019 14.6 11.7 - 15.7 g/dL Final   09/24/2019 15.7 11.7 - 15.7 g/dL Final         Imaging:  10/30/20  Average liver iron concentration is 0.7 mg/g dry tissue (normal = 0.17  - 1.8)  Average liver iron concentration is 12 mmol/kg dry tissue (normal = 3  - 33)

## 2021-07-13 NOTE — LETTER
2021         RE: Randi Cleary  [address] [facility] [address]        Dear Colleague,    Thank you for referring your patient, Randi Cleary, to the [facility]. Please see a copy of my visit note below.        [facility] Hematology Consultation    Outpatient Visit Note:    Patient: Randi Damon  MRN: [id number]  : 1955  YUAN: 2021    Reason for Consultation:  Two copies of the C282Y mutation - hereditary hemochromatosis    Assessment:  In summary, Randi Damon is a 65 year old woman with hereditary hemochromatosis and Past Medical History of KYAW, major depressive disorder, serotonin syndrome, pheochromocytoma,     1. Hereditary hemochromatosis  2. Restless leg syndrome  3. Major depressive disorder  4. Obstructive sleep apnea  5. Polycythemia, likely secondary to to #4    For hereditary hemochromatosis we typically try to remove excess iron by doing serial phlebotomy to reduce iron (ferritin) to goal of 50. However, in Ms. Damon's case, she has severe restless leg and fatigue therefore would permit for target ferritin of 125. Would NOT allow for ferritin to exceed 250 in this patient.     Ms. Damon has polycythemia. This is NOT related to HH, as iron status is regulated separate from this. This is likely secondary to her untreated KYAW, however if she were to develop leukocytosis would consider sending evaluation for MPN.     Ms. Damon is currently seeing a psychologist. She has a history of substance abuse (alcohol) and endorses concern that she may be relapsing in her behavior. Discussed resources including her current providers, AA, etc. Will refer to integrated primary care ([facility]) for second opininon and options related to substance avoidance, etc      Plan:  1. Majority of today's visit was spent counseling the patient regarding iron status, etc.  2.   Orders Placed This  Encounter   Procedures     Ferritin     Iron and iron binding capacity     Comprehensive metabolic panel     Blood gas venous     Ferritin     Iron and iron binding capacity     Internal Medicine Referral   3. Encourage use of CPAP      The patient is given our center's contact information and is instructed to call if she should have any further questions or concerns.  Otherwise, we will plan on seeing her back Follow up with Provider - labs in 3 months and follow up with provider with labs in 6 months .      Total Time Spent:  I spent a total of 45 minutes face-to-face with Randi Damon during today's office visit.  Over 50% of this time was spent counseling the patient and/or coordinating care regarding iron, nutrition and psychologic issues, additional 30 minutes was spent in evaluating patient's chart as she was a transfer of care. Total time spent: 80 minutes    Tova Pablo MD/PhD   of Medicine  [facility] [employer]   ----------------------------------------------------------------------------------------------------------------------    History of Present Illness:  Randi Damon is a 65 year old woman who is homozygous for Hereditary hemochromatosis C282Y mutation with complex medical history who presents to establish care in my clinic after her previous hematologist, Dr. Hill has relocated out of [facility].     Winnie reports today that she is continuing to recover from her serotonin syndrome episodes. Received colonoscopy without issues. Is trying to get back on top of health and is doing more gardening, etc. Continues to have pain in her shoulders. Is working to maintain her >60 lb weight loss, but some has come back on. Currently gardening. Endorses some mild sebastian symptoms at present- which she prefers over depression. RLS symptoms stable.     Past Medical History:  Past Medical History:   Diagnosis Date     Anxiety      Anxiety      Bipolar disorder (H)       Breast cancer (H) 1986    lumpectomy, radiation, chemo     Breast cancer (H) 1994     Chronic pain syndrome      COPD (chronic obstructive pulmonary disease) (H)     asthma     COPD (chronic obstructive pulmonary disease) (H)      Depression      Depression, major, recurrent (H)      Drug tolerance     opioid     Esophageal reflux      Esophageal reflux      Fatigue      Graves disease 1994     Graves disease      Hemochromatosis 02/14/2018    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; H63D not detected     Hemochromatosis      Hx antineoplastic chemotherapy      Hx of radiation therapy      Hyperlipidaemia      Hypertension      Hypertension      Impaired fasting glucose 2017     Impaired fasting glucose      Joint pain      Morbid obesity (H)      KYAW (obstructive sleep apnea) 2016     Osteopenia      Pheochromocytoma      Pheochromocytoma, left 08/02/2017    laparoscopically removed     Postablative hypothyroidism 1995     Prediabetes 10/03/2019    by A1c     Psoriasis      Psoriasis      Psoriatic arthropathy (H)      Psoriatic arthropathy (H)      Restless leg syndrome      Right rotator cuff tear      RLS (restless legs syndrome)     on ropinorole     Sacroiliitis (H)      Serotonin syndrome 08/28/2020    [facility]     Serotonin syndrome 8/28/2020     Sleep apnea 2017    CPAP     Snoring      Spinal stenosis      Spinal stenosis      Urinary incontinence      Vitamin B 12 deficiency 2009     Vitamin B12 deficiency      Vitamin D deficiency 2010       Past Surgical History:  Past Surgical History:   Procedure Laterality Date     ARTHRODESIS ANKLE       ARTHROPLASTY ANKLE Right 6/29/2015    Procedure: ARTHROPLASTY ANKLE;  Surgeon: Jason Coughlin MD;  Location: [facility]     ARTHROPLASTY REVISION ANKLE Right 6/29/2015    Procedure: ARTHROPLASTY REVISION ANKLE;  Surgeon: Jason Coughlin MD;  Location:  [facility]     BIOPSY BREAST       BREAST BIOPSY, CORE RT/LT       C ARTHRODESIS,ANKLE,OPEN Right     Description: Ankle  Arthrodesis;  Recorded: 04/07/2010;     COLONOSCOPY       COLONOSCOPY N/A 2/25/2021    Procedure: COLONOSCOPY;  Surgeon: Guru Elke Tolbert MD;  Location: [facility]     CV CORONARY ANGIOGRAM N/A 10/3/2019    Procedure: CV CORONARY ANGIOGRAM;  Surgeon: Bryce Pierre MD;  Location:  HEART CARDIAC CATH LAB     CV RIGHT HEART CATH MEASUREMENTS RECORDED N/A 10/3/2019    Procedure: CV RIGHT HEART CATH;  Surgeon: Bryce Pierre MD;  Location:  HEART CARDIAC CATH LAB     ESOPHAGOSCOPY, GASTROSCOPY, DUODENOSCOPY (EGD), COMBINED N/A 2/25/2021    Procedure: ESOPHAGOGASTRODUODENOSCOPY, WITH BIOPSY;  Surgeon: Guru Elke Tolbert MD;  Location:  GI     HC REMOVE TONSILS/ADENOIDS,<13 Y/O      Description: Tonsillectomy With Adenoidectomy;  Recorded: 04/07/2010;     IR LUMBAR EPIDURAL STEROID INJECTION  10/26/2004     IR LUMBAR EPIDURAL STEROID INJECTION  11/16/2004     IR LUMBAR EPIDURAL STEROID INJECTION  12/21/2004     IR LUMBAR EPIDURAL STEROID INJECTION  6/8/2006     JOINT REPLACEMENT       LAPAROSCOPIC ADRENALECTOMY Left 08/02/2017    pheochromocytoma     LAPAROSCOPIC ADRENALECTOMY Left 8/2/2017    Procedure: LAPAROSCOPIC LEFT ADRENALECTOMY, ;  Surgeon: Gab Linares MD;  Location: [facility];  Service:      LENGTHEN TENDON ACHILLES Right 6/29/2015    Procedure: LENGTHEN TENDON ACHILLES;  Surgeon: Jason Coughlin MD;  Location: [facility]     LUMPECTOMY BREAST       LUMPECTOMY BREAST Left 1994     MAMMOPLASTY REDUCTION Right 01/13/2015    [place]     MAMMOPLASTY REDUCTION Right     approx late 2015/early2016     MASTECTOMY      left lumpectomy with axillary node dissection     MASTECTOMY MODIFIED RADICAL       OTHER SURGICAL HISTORY Right     reconstructive breast surgery     OTHER SURGICAL HISTORY      Adrenalectomy for pheochromocytoma     LA MASTECTOMY, MODIFIED RADICAL      Description: Modified Radical Mastectomy Left Breast;  Recorded: 04/07/2010;     REPAIR  HAMMER TOE Right 6/29/2015    Procedure: REPAIR HAMMER TOE;  Surgeon: Jason Coughlin MD;  Location: [facility]     TONSILLECTOMY       TONSILLECTOMY & ADENOIDECTOMY         Medications:  Current Outpatient Medications   Medication Sig Dispense Refill     albuterol (ACCUNEB) 0.63 MG/3ML neb solution Take 1 ampule by nebulization every 6 hours as needed for shortness of breath / dyspnea or wheezing       albuterol (PROAIR HFA/PROVENTIL HFA/VENTOLIN HFA) 108 (90 Base) MCG/ACT inhaler Inhale 2 puffs into the lungs every 4 hours as needed for shortness of breath / dyspnea or wheezing 1 Inhaler 11     albuterol (PROVENTIL) (2.5 MG/3ML) 0.083% neb solution Take 1 vial (2.5 mg) by nebulization every 4 hours as needed for shortness of breath / dyspnea or wheezing 360 mL 5     ASPIRIN PO Take 81 mg by mouth 2 times daily        Cholecalciferol (VITAMIN D3) 250 MCG (42419 UT) TABS Take 1 tablet by mouth daily 10889/day       Cyanocobalamin (VITAMIN B-12) 5000 MCG SUBL Unknown dose. 2 or 3 sprays/day       diclofenac (VOLTAREN) 1 % topical gel Place onto the skin 4 times daily       Fluticasone-Umeclidin-Vilanterol (TRELEGY ELLIPTA) 100-62.5-25 MCG/INH oral inhaler Inhale 1 puff into the lungs daily 1 each 5     furosemide (LASIX) 20 MG tablet Take 2 tablets (40 mg) by mouth daily 180 tablet 2     losartan (COZAAR) 25 MG tablet Take 1 tablet (25 mg) by mouth daily 90 tablet 3     Omeprazole (PRILOSEC PO) Take 20 mg by mouth 2 times daily (before meals)       potassium chloride ER (KLOR-CON M) 20 MEQ CR tablet Take 1 tablet (20 mEq) by mouth daily 90 tablet 3     rOPINIRole (REQUIP) 2 MG tablet        SYNTHROID 150 MCG tablet Take 1 tablet (150 mcg) by mouth daily 90 tablet 4        Allergies:  Allergies   Allergen Reactions     Buspirone      The patient states she had serotonin syndrome     Desvenlafaxine      Serotonin syndrome     Diclofenac Sodium [Diclofenac]      Serotonin syndrome and restless legs syndrome      "Gabapentin      Drove on the wrong side of the highway     Levofloxacin      \"CAN'T REMEMBER\"     Penicillins      \"SORES IN MOUTH\"     Riluzole Difficulty breathing and Swelling     Sulfa Drugs      \"PT DOES NOT KNOW WHAT THE REACTION WAS\"       ROS:  A 10 point ROS is negative except as stated in the HPI    Social History:  Denies any tobacco use. Currently drinking 1/2 to 1 bottle of wine daily- new since ~7/4/21. Would like to quit. Denies any illicit drug use.     Family History:  Reviewed- no interval updates    Objective:  /67   Pulse 85   Temp 98.3  F (36.8  C)   Ht 1.64 m (5' 4.57\")   Wt 102.8 kg (226 lb 9.6 oz)   SpO2 97%   BMI 38.22 kg/m      Exam:   Constitutional: obese woman, appears older than stated age.   HEENT: Pupils equal and reactive to light. No scleral icterus or hemorrhage. Due to COVID precautions mask in place  CV: regular rate and rhythm, no murmurs  Respiratory: clear  GI:obese, unable to discern HSM  Mus/Skele: no edema  Skin: no petechiae, no ecchymosis.  Neuro: CN II-XII intact. Normal gait. AOx3  Psych: talkative, bright. Tangential.  Heme/Lymph: no supraclavicular, axillary or umbilical adenopathy.     Labs: personally reviewed with relevant trends annotated below  Ferritin   Date Value Ref Range Status   07/13/2021 44 8 - 252 ng/mL Final   05/14/2021 66 10 - 130 ng/mL Final   03/12/2021 29 10 - 130 ng/mL Final   02/05/2021 48 8 - 252 ng/mL Final   01/06/2021 50 10 - 130 ng/mL Final   10/30/2020 20 8 - 252 ng/mL Final   08/03/2020 24 10 - 130 ng/mL Final   02/12/2020 19 8 - 252 ng/mL Final   09/24/2019 33 8 - 252 ng/mL Final   09/19/2019 36 8 - 252 ng/mL Final   09/13/2019 42 8 - 252 ng/mL Final     Iron   Date Value Ref Range Status   07/13/2021 165 35 - 180 ug/dL Final   02/05/2021 141 35 - 180 ug/dL Final   01/06/2021 78 42 - 175 ug/dL Final   10/30/2020 52 35 - 180 ug/dL Final   08/03/2020 51 42 - 175 ug/dL Final   09/24/2019 92 35 - 180 ug/dL Final   08/13/2019 237 " (H) 35 - 180 ug/dL Final   02/14/2018 149 42 - 175 ug/dL Final     WBC   Date Value Ref Range Status   02/05/2021 6.9 4.0 - 11.0 10e9/L Final     WBC Count   Date Value Ref Range Status   07/13/2021 7.0 4.0 - 11.0 10e3/uL Final     Hemoglobin   Date Value Ref Range Status   07/13/2021 15.8 (H) 11.7 - 15.7 g/dL Final   05/14/2021 16.3 (H) 12.0 - 16.0 g/dL Final   03/12/2021 16.3 (H) 12.0 - 16.0 g/dL Final   02/05/2021 17.7 (H) 11.7 - 15.7 g/dL Final   01/06/2021 17.2 (H) 12.0 - 16.0 g/dL Final   10/30/2020 15.4 11.7 - 15.7 g/dL Final   08/03/2020 13.5 12.0 - 16.0 g/dL Final   07/17/2020 14.6 12.0 - 16.0 g/dL Final   03/09/2020 14.3 11.7 - 15.7 g/dL Final   02/12/2020 14.0 11.7 - 15.7 g/dL Final   10/03/2019 14.6 11.7 - 15.7 g/dL Final   09/24/2019 15.7 11.7 - 15.7 g/dL Final         Imaging:  10/30/20  Average liver iron concentration is 0.7 mg/g dry tissue (normal = 0.17  - 1.8)  Average liver iron concentration is 12 mmol/kg dry tissue (normal = 3  - 33)        Again, thank you for allowing me to participate in the care of your patient.        Sincerely,        Tova Pablo MD

## 2021-07-14 ENCOUNTER — VIRTUAL VISIT (OUTPATIENT)
Dept: PALLIATIVE MEDICINE | Facility: OTHER | Age: 66
End: 2021-07-14
Payer: MEDICARE

## 2021-07-14 VITALS — BODY MASS INDEX: 38.9 KG/M2 | HEIGHT: 64 IN

## 2021-07-14 DIAGNOSIS — M54.12 CERVICAL RADICULOPATHY: Primary | ICD-10-CM

## 2021-07-14 PROCEDURE — 999N001193 HC VIDEO/TELEPHONE VISIT; NO CHARGE: Performed by: ANESTHESIOLOGY

## 2021-07-14 PROCEDURE — 99213 OFFICE O/P EST LOW 20 MIN: CPT | Mod: 95 | Performed by: ANESTHESIOLOGY

## 2021-07-14 RX ORDER — METHOCARBAMOL 500 MG/1
TABLET, FILM COATED ORAL
COMMUNITY
Start: 2021-03-11 | End: 2021-09-29

## 2021-07-14 RX ORDER — CLOBETASOL PROPIONATE 0.5 MG/G
CREAM TOPICAL
COMMUNITY
Start: 2020-12-15 | End: 2021-07-22

## 2021-07-14 RX ORDER — OXCARBAZEPINE 150 MG/1
TABLET, FILM COATED ORAL
COMMUNITY
Start: 2021-03-02 | End: 2021-07-22

## 2021-07-14 ASSESSMENT — PAIN SCALES - GENERAL: PAINLEVEL: EXTREME PAIN (8)

## 2021-07-14 NOTE — PROGRESS NOTES
"Winnie is a 65 year old who is being evaluated via a billable video visit.      How would you like to obtain your AVS? MyChart  If the video visit is dropped, the invitation should be resent by: Text to cell phone: [phone]  Will anyone else be joining your video visit? No  Patient is here for a follow up appointment with Dr. Dusty Dubois.   Pain score: 8  Constant or intermittent: constant   What does your pain feel like: dull, aching, tingling, \"hard to hold my head up\"  Does the pain interfere with:   Work: yes  Walking/distance: yes  Sleep: yes  Daily activities: yes  Relationships/social life: no  Mood: no  F= 7    Pamela Ayers LPN  "

## 2021-07-14 NOTE — PROGRESS NOTES
"Winnie is a 65 year old who is being evaluated via a billable video visit.      How would you like to obtain your AVS? MyChart  If the video visit is dropped, the invitation should be resent by:   Will anyone else be joining your video visit?       Video Start Time:         Subjective   Winnie is a 65 year old who presents for the following health issues     HPI follow-up for mood disorder.    She reviews she was doing \"really well\", then came positive for Covid in June.  She had had her vaccines in February and March.  She was on prednisone and antibiotics.  She continues with a cough.  The wonders what part is related to lorsantin.    She is work with oncology, discussed the need iron levels and ferritin levels.  Her restless legs seem to be bit better, perhaps also do the supplements and Requip.    She has not been able to go to the pool as she is not feeling well.  She also wonder if she contracted Covid from the pool.    She has had more problems with her neck pain and headaches.  Describes size feels that her head \"flops\", and has tingling down her left wrist.  Sometimes feels she is \"can like a drunken .  She had an evaluation with [facility] where they discussed a cervical fusion.    She is appoint with Dr. Gregory for a second opinion next week.    Describes her shoulder surgeries are on hold now with his medical conditions.    Her mood is relatively stable.  Has had some \"sebastian\" her  comments which she was more talkative.  She maybe has a little bit more spending that she watches.  Does not feel that it lasts more than a full day.  Is not cycling of depression.  Try to stay active gardening.  Continues see her psychotherapist regularly.      Current Outpatient Medications:      albuterol (PROAIR HFA/PROVENTIL HFA/VENTOLIN HFA) 108 (90 Base) MCG/ACT inhaler, Inhale 2 puffs into the lungs every 4 hours as needed for shortness of breath / dyspnea or wheezing, Disp: 1 Inhaler, Rfl: 11     " albuterol (PROVENTIL) (2.5 MG/3ML) 0.083% neb solution, Take 1 vial (2.5 mg) by nebulization every 4 hours as needed for shortness of breath / dyspnea or wheezing, Disp: 360 mL, Rfl: 5     aspirin (ASA) 81 MG EC tablet, Take 81 mg by mouth daily, Disp: , Rfl:      ASPIRIN PO, Take 81 mg by mouth 2 times daily , Disp: , Rfl:      Cholecalciferol (VITAMIN D3) 250 MCG (27068 UT) TABS, Take 1 tablet by mouth daily 67578/day, Disp: , Rfl:      clobetasol (TEMOVATE) 0.05 % external cream, Mix 1:1 with Eucerin repair cream and apply to hands following hydrotherapy instructions daily at bedtime, repeat as needed for flares Exter, Disp: , Rfl:      Cyanocobalamin (VITAMIN B-12) 5000 MCG SUBL, Unknown dose. 2 or 3 sprays/day, Disp: , Rfl:      Ferrous Sulfate (IRON) 28 MG TABS, Take by mouth 4 times daily, Disp: , Rfl:      Fluticasone-Umeclidin-Vilanterol (TRELEGY ELLIPTA) 100-62.5-25 MCG/INH oral inhaler, Inhale 1 puff into the lungs daily, Disp: 1 each, Rfl: 5     furosemide (LASIX) 20 MG tablet, Take 2 tablets (40 mg) by mouth daily, Disp: 180 tablet, Rfl: 2     losartan (COZAAR) 25 MG tablet, Take 1 tablet (25 mg) by mouth daily, Disp: 90 tablet, Rfl: 3     magnesium 250 MG tablet, Take 1 tablet by mouth 2 times daily, Disp: , Rfl:      methocarbamol (ROBAXIN) 500 MG tablet, TAKE 1 TABLET BY MOUTH AT BEDTIME AS NEEDED, Disp: , Rfl:      Omeprazole (PRILOSEC PO), Take 20 mg by mouth 2 times daily (before meals), Disp: , Rfl:      omeprazole (PRILOSEC) 20 MG DR capsule, daily, Disp: , Rfl:      OXcarbazepine (TRILEPTAL) 150 MG tablet, , Disp: , Rfl:      potassium chloride ER (KLOR-CON M) 20 MEQ CR tablet, Take 1 tablet (20 mEq) by mouth daily, Disp: 90 tablet, Rfl: 3     PREBIOTIC PRODUCT PO, Take by mouth daily, Disp: , Rfl:      rOPINIRole (REQUIP) 2 MG tablet, , Disp: , Rfl:      SYNTHROID 150 MCG tablet, Take 1 tablet (150 mcg) by mouth daily, Disp: 90 tablet, Rfl: 4     Turmeric Curcumin 500 MG CAPS, , Disp: , Rfl:       umeclidinium-vilanterol (ANORO ELLIPTA) 62.5-25 MCG/INH oral inhaler, INHALE ONE PUFF BY MOUTH ONE TIME DAILY, Disp: , Rfl:      vitamin D2 (ERGOCALCIFEROL) 69467 units (1250 mcg) capsule, Take 50,000 Units by mouth once a week, Disp: , Rfl:      albuterol (ACCUNEB) 0.63 MG/3ML neb solution, Take 1 ampule by nebulization every 6 hours as needed for shortness of breath / dyspnea or wheezing, Disp: , Rfl:      diclofenac (VOLTAREN) 1 % topical gel, Place onto the skin 4 times daily, Disp: , Rfl:      medical cannabis (Patient's own supply), See Admin Instructions (The purpose of this order is to document that the patient reports taking medical cannabis.  This is not a prescription, and is not used to certify that the patient has a qualifying medical condition.) (Patient not taking: Reported on 7/14/2021), Disp: , Rfl:     Video alert, clear sensorium.  Thought process logical.  Affect is fairly full range.  Does not have pressured speech.  No cough.      Review of Systems       Objective    Vitals - Patient Reported  Pain Score: Extreme Pain (8)  Pain Loc: Neck (neck and shoulders)        Physical Exam           Time more than 25 minutes.        Video-Visit Details    Type of service:  Video Visit    Video End Time:    Originating Location (pt. Location): Home    Distant Location (provider location):  [facility]     Platform used for Video Visit: Maico

## 2021-07-14 NOTE — LETTER
"    7/14/2021         RE: Randi Cleary  [address] [facility] [address]        Dear Colleague,    Thank you for referring your patient, Randi Cleary, to the [facility]. Please see a copy of my visit note below.    Winnie is a 65 year old who is being evaluated via a billable video visit.      How would you like to obtain your AVS? MyChart  If the video visit is dropped, the invitation should be resent by: Text to cell phone: [phone]  Will anyone else be joining your video visit? No  Patient is here for a follow up appointment with Dr. Dusty Dubois.   Pain score: 8  Constant or intermittent: constant   What does your pain feel like: dull, aching, tingling, \"hard to hold my head up\"  Does the pain interfere with:   Work: yes  Walking/distance: yes  Sleep: yes  Daily activities: yes  Relationships/social life: no  Mood: no  F= 7    Pamela Ayers LPN    Winnie is a 65 year old who is being evaluated via a billable video visit.      How would you like to obtain your AVS? MyChart  If the video visit is dropped, the invitation should be resent by:   Will anyone else be joining your video visit?       Video Start Time:         Subjective   Winnie is a 65 year old who presents for the following health issues     HPI follow-up for mood disorder.    She reviews she was doing \"really well\", then came positive for Covid in June.  She had had her vaccines in February and March.  She was on prednisone and antibiotics.  She continues with a cough.  The wonders what part is related to lorsantin.    She is work with oncology, discussed the need iron levels and ferritin levels.  Her restless legs seem to be bit better, perhaps also do the supplements and Requip.    She has not been able to go to the pool as she is not feeling well.  She also wonder if she contracted Covid from the pool.    She has had more problems with her neck pain and headaches.  Describes size feels that her head " "\"flops\", and has tingling down her left wrist.  Sometimes feels she is \"can like a drunken .  She had an evaluation with [facility] where they discussed a cervical fusion.    She is appoint with Dr. Gregory for a second opinion next week.    Describes her shoulder surgeries are on hold now with his medical conditions.    Her mood is relatively stable.  Has had some \"sebastian\" her  comments which she was more talkative.  She maybe has a little bit more spending that she watches.  Does not feel that it lasts more than a full day.  Is not cycling of depression.  Try to stay active gardening.  Continues see her psychotherapist regularly.      Current Outpatient Medications:      albuterol (PROAIR HFA/PROVENTIL HFA/VENTOLIN HFA) 108 (90 Base) MCG/ACT inhaler, Inhale 2 puffs into the lungs every 4 hours as needed for shortness of breath / dyspnea or wheezing, Disp: 1 Inhaler, Rfl: 11     albuterol (PROVENTIL) (2.5 MG/3ML) 0.083% neb solution, Take 1 vial (2.5 mg) by nebulization every 4 hours as needed for shortness of breath / dyspnea or wheezing, Disp: 360 mL, Rfl: 5     aspirin (ASA) 81 MG EC tablet, Take 81 mg by mouth daily, Disp: , Rfl:      ASPIRIN PO, Take 81 mg by mouth 2 times daily , Disp: , Rfl:      Cholecalciferol (VITAMIN D3) 250 MCG (02860 UT) TABS, Take 1 tablet by mouth daily 77877/day, Disp: , Rfl:      clobetasol (TEMOVATE) 0.05 % external cream, Mix 1:1 with Eucerin repair cream and apply to hands following hydrotherapy instructions daily at bedtime, repeat as needed for flares Exter, Disp: , Rfl:      Cyanocobalamin (VITAMIN B-12) 5000 MCG SUBL, Unknown dose. 2 or 3 sprays/day, Disp: , Rfl:      Ferrous Sulfate (IRON) 28 MG TABS, Take by mouth 4 times daily, Disp: , Rfl:      Fluticasone-Umeclidin-Vilanterol (TRELEGY ELLIPTA) 100-62.5-25 MCG/INH oral inhaler, Inhale 1 puff into the lungs daily, Disp: 1 each, Rfl: 5     furosemide (LASIX) 20 MG tablet, Take 2 tablets (40 mg) by mouth " daily, Disp: 180 tablet, Rfl: 2     losartan (COZAAR) 25 MG tablet, Take 1 tablet (25 mg) by mouth daily, Disp: 90 tablet, Rfl: 3     magnesium 250 MG tablet, Take 1 tablet by mouth 2 times daily, Disp: , Rfl:      methocarbamol (ROBAXIN) 500 MG tablet, TAKE 1 TABLET BY MOUTH AT BEDTIME AS NEEDED, Disp: , Rfl:      Omeprazole (PRILOSEC PO), Take 20 mg by mouth 2 times daily (before meals), Disp: , Rfl:      omeprazole (PRILOSEC) 20 MG DR capsule, daily, Disp: , Rfl:      OXcarbazepine (TRILEPTAL) 150 MG tablet, , Disp: , Rfl:      potassium chloride ER (KLOR-CON M) 20 MEQ CR tablet, Take 1 tablet (20 mEq) by mouth daily, Disp: 90 tablet, Rfl: 3     PREBIOTIC PRODUCT PO, Take by mouth daily, Disp: , Rfl:      rOPINIRole (REQUIP) 2 MG tablet, , Disp: , Rfl:      SYNTHROID 150 MCG tablet, Take 1 tablet (150 mcg) by mouth daily, Disp: 90 tablet, Rfl: 4     Turmeric Curcumin 500 MG CAPS, , Disp: , Rfl:      umeclidinium-vilanterol (ANORO ELLIPTA) 62.5-25 MCG/INH oral inhaler, INHALE ONE PUFF BY MOUTH ONE TIME DAILY, Disp: , Rfl:      vitamin D2 (ERGOCALCIFEROL) 21435 units (1250 mcg) capsule, Take 50,000 Units by mouth once a week, Disp: , Rfl:      albuterol (ACCUNEB) 0.63 MG/3ML neb solution, Take 1 ampule by nebulization every 6 hours as needed for shortness of breath / dyspnea or wheezing, Disp: , Rfl:      diclofenac (VOLTAREN) 1 % topical gel, Place onto the skin 4 times daily, Disp: , Rfl:      medical cannabis (Patient's own supply), See Admin Instructions (The purpose of this order is to document that the patient reports taking medical cannabis.  This is not a prescription, and is not used to certify that the patient has a qualifying medical condition.) (Patient not taking: Reported on 7/14/2021), Disp: , Rfl:     Video alert, clear sensorium.  Thought process logical.  Affect is fairly full range.  Does not have pressured speech.  No cough.      Review of Systems       Objective    Vitals - Patient  Reported  Pain Score: Extreme Pain (8)  Pain Loc: Neck (neck and shoulders)        Physical Exam           Time more than 25 minutes.        Video-Visit Details    Type of service:  Video Visit    Video End Time:    Originating Location (pt. Location): Home    Distant Location (provider location):  [facility]     Platform used for Video Visit: Maico      Again, thank you for allowing me to participate in the care of your patient.        Sincerely,        AXEL WIGGINS MD

## 2021-07-14 NOTE — PATIENT INSTRUCTIONS
PLAN:   You will be reenrolled in the [place] medical cannabis program.    You are working with the oncologist around your iron replacement and ferritin levels.    Continue working with your psychotherapist and monitoring for any mood cycling.    You have an appointment for second opinion for neck surgery.    Follow-up with Dr. Dubois in 8 weeks.

## 2021-07-15 ENCOUNTER — VIRTUAL VISIT (OUTPATIENT)
Dept: PSYCHOLOGY | Facility: CLINIC | Age: 66
End: 2021-07-15
Payer: MEDICARE

## 2021-07-15 ENCOUNTER — CARE COORDINATION (OUTPATIENT)
Dept: ONCOLOGY | Facility: CLINIC | Age: 66
End: 2021-07-15

## 2021-07-15 DIAGNOSIS — F41.1 GAD (GENERALIZED ANXIETY DISORDER): ICD-10-CM

## 2021-07-15 DIAGNOSIS — F33.1 MAJOR DEPRESSIVE DISORDER, RECURRENT EPISODE, MODERATE WITH ANXIOUS DISTRESS (H): Primary | ICD-10-CM

## 2021-07-15 PROBLEM — E53.8 VITAMIN B12 DEFICIENCY: Status: ACTIVE | Noted: 2021-07-15

## 2021-07-15 PROBLEM — F41.9 ANXIETY DISORDER, UNSPECIFIED: Status: ACTIVE | Noted: 2021-07-15

## 2021-07-15 PROBLEM — E55.9 VITAMIN D DEFICIENCY: Status: ACTIVE | Noted: 2021-07-15

## 2021-07-15 PROBLEM — E66.813 CLASS 3 SEVERE OBESITY WITH SERIOUS COMORBIDITY AND BODY MASS INDEX (BMI) OF 40.0 TO 44.9 IN ADULT (H): Status: ACTIVE | Noted: 2019-11-19

## 2021-07-15 PROBLEM — C50.912 MALIGNANT NEOPLASM OF LEFT FEMALE BREAST, UNSPECIFIED ESTROGEN RECEPTOR STATUS, UNSPECIFIED SITE OF BREAST (H): Status: ACTIVE | Noted: 2019-02-19

## 2021-07-15 PROBLEM — G25.81 RESTLESS LEG SYNDROME: Status: ACTIVE | Noted: 2021-07-15

## 2021-07-15 PROBLEM — G90.81 SEROTONIN SYNDROME: Status: ACTIVE | Noted: 2020-08-10

## 2021-07-15 PROBLEM — S13.4XXA INJURY OF NECK, WHIPLASH: Status: ACTIVE | Noted: 2021-07-15

## 2021-07-15 PROBLEM — E89.0 POSTABLATIVE HYPOTHYROIDISM: Status: ACTIVE | Noted: 2019-11-19

## 2021-07-15 PROBLEM — Z85.3 HISTORY OF BREAST CANCER: Status: ACTIVE | Noted: 2020-08-28

## 2021-07-15 PROBLEM — E66.01 CLASS 3 SEVERE OBESITY WITH SERIOUS COMORBIDITY AND BODY MASS INDEX (BMI) OF 40.0 TO 44.9 IN ADULT (H): Status: ACTIVE | Noted: 2019-11-19

## 2021-07-15 LAB — EPO SERPL-ACNC: 12 MU/ML

## 2021-07-15 PROCEDURE — 90837 PSYTX W PT 60 MINUTES: CPT | Mod: 95 | Performed by: SOCIAL WORKER

## 2021-07-15 ASSESSMENT — PATIENT HEALTH QUESTIONNAIRE - PHQ9
SUM OF ALL RESPONSES TO PHQ QUESTIONS 1-9: 13
SUM OF ALL RESPONSES TO PHQ QUESTIONS 1-9: 13
10. IF YOU CHECKED OFF ANY PROBLEMS, HOW DIFFICULT HAVE THESE PROBLEMS MADE IT FOR YOU TO DO YOUR WORK, TAKE CARE OF THINGS AT HOME, OR GET ALONG WITH OTHER PEOPLE: VERY DIFFICULT

## 2021-07-15 ASSESSMENT — ANXIETY QUESTIONNAIRES
8. IF YOU CHECKED OFF ANY PROBLEMS, HOW DIFFICULT HAVE THESE MADE IT FOR YOU TO DO YOUR WORK, TAKE CARE OF THINGS AT HOME, OR GET ALONG WITH OTHER PEOPLE?: VERY DIFFICULT
GAD7 TOTAL SCORE: 9
4. TROUBLE RELAXING: MORE THAN HALF THE DAYS
GAD7 TOTAL SCORE: 9
2. NOT BEING ABLE TO STOP OR CONTROL WORRYING: MORE THAN HALF THE DAYS
6. BECOMING EASILY ANNOYED OR IRRITABLE: SEVERAL DAYS
GAD7 TOTAL SCORE: 9
7. FEELING AFRAID AS IF SOMETHING AWFUL MIGHT HAPPEN: SEVERAL DAYS
7. FEELING AFRAID AS IF SOMETHING AWFUL MIGHT HAPPEN: SEVERAL DAYS
1. FEELING NERVOUS, ANXIOUS, OR ON EDGE: SEVERAL DAYS
3. WORRYING TOO MUCH ABOUT DIFFERENT THINGS: SEVERAL DAYS
5. BEING SO RESTLESS THAT IT IS HARD TO SIT STILL: SEVERAL DAYS

## 2021-07-15 NOTE — PROGRESS NOTES
INCOMING CALL: pt LVM for writer (covering for RNNATALIIA Tobias today). [facility] and personal questions re: the visit. Has questions for Dr Pablo as well.  Callback number: [phone]

## 2021-07-15 NOTE — PROGRESS NOTES
Progress Note    Patient Name: Randi Cleary  Date: 7/15/21         Service Type: Individual      Session Start Time: 9:35 AM  Session End Time: 10:31 AM     Session Length: 56 minutes    Session #: 47    Attendees: Client attended alone    Service Modality:  Video Visit:    Telemedicine Visit: The patient's condition can be safely assessed and treated via synchronous audio and visual telemedicine encounter.      Reason for Telemedicine Visit: Services only offered telehealth    Originating Site (Patient Location): Patient's home    Distant Site (Provider Location): Provider Remote Setting- Home Office    Consent:  The patient/guardian has verbally consented to: the potential risks and benefits of telemedicine (video visit) versus in person care; bill my insurance or make self-payment for services provided; and responsibility for payment of non-covered services.     Mode of Communication:  Video Conference via [employer]    As the provider I attest to compliance with applicable laws and regulations related to telemedicine.      Treatment Plan Last Reviewed: 6/10/21  PHQ-9 / CHAVO-7 :   PHQ 6/10/2021 6/23/2021 7/15/2021   PHQ-9 Total Score 10 13 13   Q9: Thoughts of better off dead/self-harm past 2 weeks Not at all Not at all Not at all   F/U: Thoughts of suicide or self-harm - - -   F/U: Self harm-plan - - -   F/U: Self-harm action - - -   F/U: Safety concerns - - -   Some encounter information is confidential and restricted. Go to Review Flowsheets activity to see all data.     CHAVO-7 SCORE 5/21/2021 6/10/2021 7/15/2021   Total Score 9 (mild anxiety) 11 (moderate anxiety) 9 (mild anxiety)   Total Score 9 11 9   Some encounter information is confidential and restricted. Go to Review Flowsheets activity to see all data.         DATA  Extended Session (53+ minutes): PROLONGED SERVICE IN THE OUTPATIENT SETTING REQUIRING DIRECT (FACE-TO-FACE) PATIENT CONTACT BEYOND THE USUAL  SERVICE:    - High distress and under complex circumstances.  See Data section for details  Interactive Complexity: No  Crisis: No      Progress Since Last Session (Related to Symptoms / Goals / Homework):   Symptoms: patient has been distressed and is struggling to manage her mental health with all of her physical concerns     Homework: Partially completed   Give your credit card to your    Talk to Dr. Bella about medications       Episode of Care Goals: Satisfactory progress - ACTION (Actively working towards change); Intervened by reinforcing change plan / affirming steps taken     Current / Ongoing Stressors and Concerns:   Patient is currently socially isolated. She has a conflictual relationship with her .  She is getting minimal physical activity.  She had recent eye surgery     Treatment Objective(s) Addressed in This Session:   Patient will increase frequency of engaging her in ADLs.  Patient will track and record at least 5 pleasant exchanges with . Patient will be able to identify at least 5 positive traits about her .  Patient will reduce level of depressive and anxious features as evidenced by reduction in score on her CHAVO-7 and PHQ-9 (scores of 15 and 16 at first measurment, respectively).     Intervention:   CBT: Person-Centered Therapy: Patient shared a negative experience she had and the strong emotional reaction she had. Discussed some of the choices she has made lately and this was adding in to her challenges.  Talked about this change and how to manage this. Also talked about contributing factors, including her neck pain, her lack of swimming, and her challenges with having the recent COVID diagnosis. Discussed next steps, including getting back to the pool, and trying to make healthy choices the evening before and the morning of going to the pool.      ASSESSMENT: Current Emotional / Mental Status (status of significant symptoms):   Risk status (Self / Other harm or  suicidal ideation)   Patient denies current fears or concerns for personal safety.   Patient denies current or recent suicidal ideation or behaviors.   Patient denies current or recent homicidal ideation or behaviors.   Patient denies current or recent self injurious behavior or ideation.   Patient denies other safety concerns.   Patient reports there has been no change in risk factors since their last session.     Patient reports there has been no change in protective factors since their last session.     A safety and risk management plan has been developed including: Patient consented to co-developed safety plan.  Safety and risk management plan was completed.  Patient agreed to use safety plan should any safety concerns arise.  A copy was given to the patient. This was done on 11/24/2020 and is attached to the bottom of the note.     Appearance:   Appropriate    Eye Contact:   Fair    Psychomotor Behavior: Normal    Attitude:   Cooperative    Orientation:   All   Speech    Rate / Production: Normal/ Responsive    Volume:  Normal    Mood:    Sad    Affect:    Tearful   Thought Content:  Clear    Thought Form:  Coherent    Insight:    Fair      Medication Review:   No changes to current psychiatric medication(s)     Medication Compliance:   Yes     Changes in Health Issues:   None reported     Chemical Use Review:   Substance Use: change in use.  Patient reports frequency of use  .  Provided support and affirmation for steps taken towards sobriety         Tobacco Use: No current tobacco use.      Diagnosis:  1. Major depressive disorder, recurrent episode, moderate with anxious distress (H)    2. CHAVO (generalized anxiety disorder)    R/O bipolar disorder  Collateral Reports Completed:   Not Applicable    PLAN: (Patient Tasks / Therapist Tasks / Other)  Get back to the pool  Make healthy choices the evening before and the morning of the pool    MICAELA SLADE    July 15, 2021                                        "                  ______________________________________________________________________    Treatment Plan    Patient's Name: Randi Cleary  YOB: 1955    Date: 6/10/21    DSM5 Diagnoses: 296.32 (F33.1) Major Depressive Disorder, Recurrent Episode, Moderate With anxious distress  Psychosocial / Contextual Factors: Patient's entire family of origin has , she now has a sister-in-law and  as support.  Relationship with  is conflictual.  Patient is reporting increase in physical symptoms due to COVID-19.  Patient is off of pain medications.  WHODAS: 42    Referral / Collaboration:  Referral to another professional/service is not indicated at this time.     Anticipated number of session or this episode of care: 12-15      MeasurableTreatment Goal(s) related to diagnosis / functional impairment(s)  Goal 1: Patient will \"jumpstart, getting going with the things I need to be doing around the house as far as picking up, doing things, trying to do something every day.  Also to lessen the animosity between me and my .\"    I will know I've met my goal when my shoulders are fixed and I can see.      Objective #A (Patient Action)    Patient will increase frequency of engaging her in ADLs.  Status: Continued - Date(s): 6/10/21    Intervention(s)  Therapist will engage patient in CBT, specifically behavioral activation.    Objective #B  Patient will track and record at least 5 pleasant exchanges with . Patient will be able to identify at least 5 positive traits about her  and how he relates to her.  Status: Continued - Date(s): 6/10/21    Intervention(s)  Therapist will teach assertiveness skills and assign homework related to relationship interactions.    Objective #C  Patient will reduce level of depressive and anxious features as evidenced by reduction in score on her CHAVO-7 and PHQ-9 (scores of 15 and 16 at first measurment, respectively).  Status: Continued - Date(s):  " "6/10/21    Intervention(s)  Therapist will engage patient in person-centered therapy and CBT.    Patient has reviewed and agreed to the above plan.      MICAELA SLADE  Jenny 10, 2021                                                Randi Cleary          SAFETY PLAN:  Step 1: Warning signs / cues (Thoughts, images, mood, situation, behavior) that a crisis may be developing:  ? Thoughts: \"I don't want to continue\" \"I am unwanted\"  ? Images: none  ? Thinking Processes: ruminating  ? Mood: anger  ? Behaviors: isolating/withdrawing , can't stop crying, not taking care of myself and not taking care of my responsibilities  ? Situations: small triggers, such as not being able to find something, or dropping something   Step 2: Coping strategies - Things I can do to take my mind off of my problems without contacting another person (relaxation technique, physical activity):  ? Distress Tolerance Strategies:  arts and crafts: drawing, play with my pet , listen to positive and upbeat music: any, change body temperature (ice pack/cold water)  and paced breathing/progressive muscle relaxation  ? Physical Activities: go for a walk, deep breathing and stretching   ? Focus on helpful thoughts:  \"You've been through this before, you can get through it again.\"  Step 3: People and social settings that provide distraction:                 Name: Carmen                            Name: Darien                           Name: Aleida       ? pool, shopping, Carmen's house, [employer]       Step 4: Remind myself of people and things that are important to me and worth living for:  Sidney, Aleida Horton, post-COVID world, options of what could be in your future        Step 5: When I am in crisis, I can ask these people to help me use my safety plan:                 Name: Sidney  Step 6: Making the environment safe:   ? go to sleep/daydream  Step 7: Professionals or agencies I can contact during a crisis:  ? [facility] Daytime " Number: [phone]  ? Suicide Prevention Lifeline: 6-496-579-TALK (2824)  ? Crisis Text Line Service (available 24 hours a day, 7 days a week): Text [place] to 016260    Local Crisis Services: [facility] Crisis: [phone]     Call 911 or go to my nearest emergency department.       I helped develop this safety plan and agree to use it when needed.  I have been given a copy of this plan.       Client signature _________________________________________________________________  Today s date:  11/24/2020  Adapted from Safety Plan Template 2008 Randi Poole and Robby Barba is reprinted with the express permission of the authors.  No portion of the Safety Plan Template may be reproduced without the express, written permission.  You can contact the authors at [email or web address] or [email or web address].

## 2021-07-16 ENCOUNTER — TELEPHONE (OUTPATIENT)
Dept: PSYCHOLOGY | Facility: CLINIC | Age: 66
End: 2021-07-16
Payer: MEDICARE

## 2021-07-16 ENCOUNTER — PATIENT OUTREACH (OUTPATIENT)
Dept: ONCOLOGY | Facility: CLINIC | Age: 66
End: 2021-07-16

## 2021-07-16 ASSESSMENT — PATIENT HEALTH QUESTIONNAIRE - PHQ9: SUM OF ALL RESPONSES TO PHQ QUESTIONS 1-9: 13

## 2021-07-16 ASSESSMENT — ANXIETY QUESTIONNAIRES: GAD7 TOTAL SCORE: 9

## 2021-07-16 NOTE — PROGRESS NOTES
"Writer placed call to Winnie in response to requests for call back to discuss some personal concerns. Winnie reported that in her visit with Dr Pablo on 7/13, she inquired about the effects of alcohol on her HHT, she relapsed last year and was drinking per current conversation. Clinic visit notes discuss a current concern for relapse and this was read by her counselor prior to Winnie discussing with her counselor. This was upsetting to Winnie as she does not want her conversations with one provider visible to others and stated she will \"cancel MyChart\" and not talk to providers anymore. She also requested that the conversation between us not be documented to which I responded that I have an obligation to document what we discuss to protect both of us. This was very distressing to her, and we have discussed this in the past when  was a part of her care team. Writer also clarified that she is ok to cancel her MyChart feature but that it doesn't cancel her EMR. She wants to know about alcohol and her HHT and if there are consequences, writer advised how HHT affects organs and how alcohol affects organs. Per Winnie, Dr Pablo also offered to place orders for labs to ensure no damage was done above and beyond her normal status and those were completed in lab. This brought Winnie to another point of concern. She felt embarrassed that she was taken into a private area in the lab due to a positive COVID test in June despite having been vaccinated in early 2021. Writer did not have a chance to explain why this may have been necessary though enough time has passed that if she wasn't displaying symptoms, would warrant isolating her from others. Winnie spent the majority of the approximately 30 minutes on the phone discussing concerns in circular pattern, sometimes not able to complete conversations and then would stop and sigh. Overall, her concerns were regarding her isolation in the lab, what her lab values mean, how alcohol " "affects HHT, having her conversations documented, her PT and \"the pool\", her neck. Advised I would update her care team of her concerns and they would follow up as needed. Staff message sent to Dr Pablo and copied to Urvashi Tobias, CHRISTINACC, informing them of call and that notes are in place.  "

## 2021-07-16 NOTE — TELEPHONE ENCOUNTER
Writer returned a call to Winnie. Winnie explained the conversation she had regarding her provider who had documented that she had relapsed. She was afraid she did not handle this well and was concerned on how she was perceived. Winnie stated she wished she had not reached out while she was still highly emotional or that she hadn't reached out at all.  Discussed how this has been a pattern in the past, and that in future therapy sessions we can help her identify when she is in these heightened states and how to handle managing her own reactions, then reaching out with any concerns once she has had time to process her emotions and organize her thoughts.  Winnie also acknowledged that she now understands that conversations with the providers need to be reflected in her chart as the chart is a record of her health and her visits.

## 2021-07-19 ENCOUNTER — TRANSFERRED RECORDS (OUTPATIENT)
Dept: NEUROSURGERY | Facility: CLINIC | Age: 66
End: 2021-07-19

## 2021-07-19 ENCOUNTER — TELEPHONE (OUTPATIENT)
Dept: FAMILY MEDICINE | Facility: CLINIC | Age: 66
End: 2021-07-19

## 2021-07-19 NOTE — TELEPHONE ENCOUNTER
Please let patient know her hematologist (Dr Pablo) had reached out to me with interest in having her be [facility], however that clinic was closed in December so it is operating as a regular primary care clinic at this point though we do have some additional mental health resources (psychiatry, additional social workers).  If she is in need of a primary care provider she is more than welcome to establish care with someone here, otherwise if she is established with a primary care provider she can continue there.      Felicia Contreras, DO on 7/19/2021 at 5:22 PM

## 2021-07-19 NOTE — TELEPHONE ENCOUNTER
Reason for Call:  Other call back    Detailed comments: PT was called and scheduled. Pt wants someone to call her back to answer more question that pt has regarding of Dr. Flores specialties and practice.   Future Appointments   Date Time Provider Department Center   8/23/2021  1:00 PM [facility]       Phone Number Patient can be reached at: Home number on file [phone] (home)    Best Time: ANY    Can we leave a detailed message on this number? YES    Call taken on 7/19/2021 at 4:41 PM by Helen Everett

## 2021-07-19 NOTE — TELEPHONE ENCOUNTER
Received note from hematology requesting assistance in getting patient established with PCP.      TCs please reach out to patient to schedule appointment.      Thanks!    Felicia Contreras, DO on 7/19/2021 at 1:52 PM

## 2021-07-20 ENCOUNTER — OFFICE VISIT (OUTPATIENT)
Dept: NEUROSURGERY | Facility: CLINIC | Age: 66
End: 2021-07-20
Payer: MEDICARE

## 2021-07-20 VITALS
HEART RATE: 92 BPM | OXYGEN SATURATION: 96 % | RESPIRATION RATE: 20 BRPM | SYSTOLIC BLOOD PRESSURE: 138 MMHG | WEIGHT: 226 LBS | DIASTOLIC BLOOD PRESSURE: 76 MMHG | HEIGHT: 64 IN | BODY MASS INDEX: 38.58 KG/M2

## 2021-07-20 DIAGNOSIS — M47.12 CERVICAL SPONDYLOSIS WITH MYELOPATHY: Primary | ICD-10-CM

## 2021-07-20 PROCEDURE — 99205 OFFICE O/P NEW HI 60 MIN: CPT | Performed by: SURGERY

## 2021-07-20 ASSESSMENT — MIFFLIN-ST. JEOR: SCORE: 1545.13
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
## 2021-07-20 NOTE — PROGRESS NOTES
Completed online renewal for the Medical Cannabis program. Left a detailed message for patient.   Pamela Ayers LPN

## 2021-07-22 ENCOUNTER — VIRTUAL VISIT (OUTPATIENT)
Dept: PSYCHOLOGY | Facility: CLINIC | Age: 66
End: 2021-07-22
Payer: MEDICARE

## 2021-07-22 DIAGNOSIS — E61.1 IRON DEFICIENCY: ICD-10-CM

## 2021-07-22 DIAGNOSIS — F33.1 MAJOR DEPRESSIVE DISORDER, RECURRENT EPISODE, MODERATE WITH ANXIOUS DISTRESS (H): Primary | ICD-10-CM

## 2021-07-22 DIAGNOSIS — F41.1 GAD (GENERALIZED ANXIETY DISORDER): ICD-10-CM

## 2021-07-22 DIAGNOSIS — R06.02 SOB (SHORTNESS OF BREATH): ICD-10-CM

## 2021-07-22 DIAGNOSIS — E53.8 VITAMIN B12 DEFICIENCY: ICD-10-CM

## 2021-07-22 DIAGNOSIS — E83.119 HEMOCHROMATOSIS, UNSPECIFIED HEMOCHROMATOSIS TYPE: Primary | ICD-10-CM

## 2021-07-22 DIAGNOSIS — I27.20 PULMONARY HYPERTENSION (H): ICD-10-CM

## 2021-07-22 PROCEDURE — 90834 PSYTX W PT 45 MINUTES: CPT | Mod: 95 | Performed by: SOCIAL WORKER

## 2021-07-22 RX ORDER — FUROSEMIDE 20 MG
20 TABLET ORAL DAILY
Qty: 180 TABLET | Refills: 0 | COMMUNITY
Start: 2021-07-22 | End: 2021-09-01

## 2021-07-22 NOTE — PROGRESS NOTES
Winnie called to discuss her concerns with the last office visit she had with Dr. Tova Pablo. She had spoken with my coworker, Lashonda Rubin RN, on 7/16-- see note. The discussion I had with Winnie was identical in content, with concerns she had been isolated in the lab due to a positive COVID-19 result in June, she had spoken to the provider in confidence but it was available in her chart for all providers to see, and she was unsure of the lab results / POC. She was also distressed she was unable to reach me, despite my voicemail stating I was out of office and therefore Lashonda spoke with her on 7/16. Wninie stated she did speak with patient relations but wanted to wait until she spoke with her care team to gain further clarity and see if we would change our documentation, specifically Dr. Tova Pablo.     Per MD, she did not document the full breadth of their conversation and had summarized to the best of her ability while also briefly mentioning a physical and mental health component that may truly affect Winnie's overall prognosis. We discussed Winnie may be more sensitive to particular words/ phrases due to her underlying mental health issues and background so this most likely stands out to her when she reviews the notes but in review of the note her mental and behavioral health concerns are not notable. We reviewed the lab results and the recommendations MD made at the end of her visit note which was comforting for her. I also updated her current medication list, as she noted she was not on iron tablets but rather taking a multivitamin with iron. She was grateful for the time spent reviewing her concerns. We did review the possibility of contacting patient relations to make her chart private, requiring providers to sign in and provide the rationale for entering her chart and particular notes. This could reduce the amount of entrances into her chart or particular notes that she's concerned about. Winnie declined, making a  statement regarding the legalization of information in the [place] and therefore making her chart private will not reduce the entrances into her chart. She mentioned she would like her providers to know what is going on with her health and therefore she would like to maintain open access. I left her with this option if she would like but for now she will meet with MD again in 3 months with labs prior so they can review during the appt.

## 2021-07-22 NOTE — PROGRESS NOTES
Progress Note    Patient Name: Randi Cleary  Date: 7/22/21         Service Type: Individual      Session Start Time: 9:32 AM  Session End Time: 10:22 AM     Session Length: 50 minutes    Session #: 48    Attendees: Client attended alone    Service Modality:  Video Visit:    Telemedicine Visit: The patient's condition can be safely assessed and treated via synchronous audio and visual telemedicine encounter.      Reason for Telemedicine Visit: Services only offered telehealth    Originating Site (Patient Location): Patient's home    Distant Site (Provider Location): Provider Remote Setting- Home Office    Consent:  The patient/guardian has verbally consented to: the potential risks and benefits of telemedicine (video visit) versus in person care; bill my insurance or make self-payment for services provided; and responsibility for payment of non-covered services.     Mode of Communication:  Video Conference via [employer]    As the provider I attest to compliance with applicable laws and regulations related to telemedicine.      Treatment Plan Last Reviewed: 6/10/21  PHQ-9 / CHAVO-7 :   PHQ 6/10/2021 6/23/2021 7/15/2021   PHQ-9 Total Score 10 13 13   Q9: Thoughts of better off dead/self-harm past 2 weeks Not at all Not at all Not at all   F/U: Thoughts of suicide or self-harm - - -   F/U: Self harm-plan - - -   F/U: Self-harm action - - -   F/U: Safety concerns - - -   Some encounter information is confidential and restricted. Go to Review Flowsheets activity to see all data.     CHAVO-7 SCORE 5/21/2021 6/10/2021 7/15/2021   Total Score 9 (mild anxiety) 11 (moderate anxiety) 9 (mild anxiety)   Total Score 9 11 9   Some encounter information is confidential and restricted. Go to Review Flowsheets activity to see all data.         DATA  Extended Session (53+ minutes): No  Interactive Complexity: No  Crisis: No      Progress Since Last Session (Related to Symptoms / Goals /  Homework):   Symptoms: patient has continued to be distressed and worried     Homework: Partially completed   Get back to the pool -not done   Make healthy choices the evening before and the morning of the pool -N/A due to not going to the pool     Episode of Care Goals: Satisfactory progress - ACTION (Actively working towards change); Intervened by reinforcing change plan / affirming steps taken     Current / Ongoing Stressors and Concerns:   Patient is currently socially isolated. She has a conflictual relationship with her .  She is getting minimal physical activity.  She had recent eye surgery     Treatment Objective(s) Addressed in This Session:   Patient will increase frequency of engaging her in ADLs.  Patient will track and record at least 5 pleasant exchanges with . Patient will be able to identify at least 5 positive traits about her .  Patient will reduce level of depressive and anxious features as evidenced by reduction in score on her CHAVO-7 and PHQ-9 (scores of 15 and 16 at first measurment, respectively).     Intervention:   CBT: Person-Centered Therapy: Talked through [facility]'s [facility] and her reactions to these. Explored how this impacted her, steps she has taken to attempt to resolve this, and what is next. Helped patient identify different perspectives and let her know her decision of how to proceed is up to her. She decided she would go back and review the notes with a fresh set of eyes now that it is no longer as fresh of a situation. Discussed getting back to the pool, what prevented this in the previous week, and how to make sure it happens in this next week, as well as how it benefits her. Talked about possible barriers and how to avoid them.    ASSESSMENT: Current Emotional / Mental Status (status of significant symptoms):   Risk status (Self / Other harm or suicidal ideation)   Patient denies current fears or concerns for personal safety.   Patient  denies current or recent suicidal ideation or behaviors.   Patient denies current or recent homicidal ideation or behaviors.   Patient denies current or recent self injurious behavior or ideation.   Patient denies other safety concerns.   Patient reports there has been no change in risk factors since their last session.     Patient reports there has been no change in protective factors since their last session.     A safety and risk management plan has been developed including: Patient consented to co-developed safety plan.  Safety and risk management plan was completed.  Patient agreed to use safety plan should any safety concerns arise.  A copy was given to the patient. This was done on 11/24/2020 and is attached to the bottom of the note.     Appearance:   Appropriate    Eye Contact:   Fair    Psychomotor Behavior: Normal    Attitude:   Cooperative    Orientation:   All   Speech    Rate / Production: Normal/ Responsive    Volume:  Normal    Mood:    Anxious  Sad    Affect:    Worrisome    Thought Content:  Clear    Thought Form:  Coherent    Insight:    Fair      Medication Review:   No changes to current psychiatric medication(s)     Medication Compliance:   Yes     Changes in Health Issues:   None reported     Chemical Use Review:   Substance Use: decrease in use.  Patient reports frequency of use weekend only.  Provided support and affirmation for steps taken towards sobriety         Tobacco Use: No current tobacco use.      Diagnosis:  1. Major depressive disorder, recurrent episode, moderate with anxious distress (H)    2. CHAVO (generalized anxiety disorder)    R/O bipolar disorder  Collateral Reports Completed:   Not Applicable    PLAN: (Patient Tasks / Therapist Tasks / Other)  Get back to the pool  Make healthy choices the evening before and the morning of the pool    MICAELA SLADE    July 22, 2021                                                      "    ______________________________________________________________________    Treatment Plan    Patient's Name: Randi Cleary  YOB: 1955    Date: 6/10/21    DSM5 Diagnoses: 296.32 (F33.1) Major Depressive Disorder, Recurrent Episode, Moderate With anxious distress  Psychosocial / Contextual Factors: Patient's entire family of origin has , she now has a sister-in-law and  as support.  Relationship with  is conflictual.  Patient is reporting increase in physical symptoms due to COVID-19.  Patient is off of pain medications.  WHODAS: 42    Referral / Collaboration:  Referral to another professional/service is not indicated at this time.     Anticipated number of session or this episode of care: 12-15      MeasurableTreatment Goal(s) related to diagnosis / functional impairment(s)  Goal 1: Patient will \"jumpstart, getting going with the things I need to be doing around the house as far as picking up, doing things, trying to do something every day.  Also to lessen the animosity between me and my .\"    I will know I've met my goal when my shoulders are fixed and I can see.      Objective #A (Patient Action)    Patient will increase frequency of engaging her in ADLs.  Status: Continued - Date(s): 6/10/21    Intervention(s)  Therapist will engage patient in CBT, specifically behavioral activation.    Objective #B  Patient will track and record at least 5 pleasant exchanges with . Patient will be able to identify at least 5 positive traits about her  and how he relates to her.  Status: Continued - Date(s): 6/10/21    Intervention(s)  Therapist will teach assertiveness skills and assign homework related to relationship interactions.    Objective #C  Patient will reduce level of depressive and anxious features as evidenced by reduction in score on her CHAVO-7 and PHQ-9 (scores of 15 and 16 at first measurment, respectively).  Status: Continued - Date(s):  " "6/10/21    Intervention(s)  Therapist will engage patient in person-centered therapy and CBT.    Patient has reviewed and agreed to the above plan.      MICAELA SLADE  Jenny 10, 2021                                                Randi Cleary          SAFETY PLAN:  Step 1: Warning signs / cues (Thoughts, images, mood, situation, behavior) that a crisis may be developing:  ? Thoughts: \"I don't want to continue\" \"I am unwanted\"  ? Images: none  ? Thinking Processes: ruminating  ? Mood: anger  ? Behaviors: isolating/withdrawing , can't stop crying, not taking care of myself and not taking care of my responsibilities  ? Situations: small triggers, such as not being able to find something, or dropping something   Step 2: Coping strategies - Things I can do to take my mind off of my problems without contacting another person (relaxation technique, physical activity):  ? Distress Tolerance Strategies:  arts and crafts: drawing, play with my pet , listen to positive and upbeat music: any, change body temperature (ice pack/cold water)  and paced breathing/progressive muscle relaxation  ? Physical Activities: go for a walk, deep breathing and stretching   ? Focus on helpful thoughts:  \"You've been through this before, you can get through it again.\"  Step 3: People and social settings that provide distraction:                 Name: Carmen                            Name: Darien                           Name: Aleida       ? pool, shopping, Carmen's house, [employer]       Step 4: Remind myself of people and things that are important to me and worth living for:  Sidney, Aleida Horton, post-COVID world, options of what could be in your future        Step 5: When I am in crisis, I can ask these people to help me use my safety plan:                 Name: Sidney  Step 6: Making the environment safe:   ? go to sleep/daydream  Step 7: Professionals or agencies I can contact during a crisis:  ? [facility] Daytime " Number: [phone]  ? Suicide Prevention Lifeline: 7-537-984-TALK (6177)  ? Crisis Text Line Service (available 24 hours a day, 7 days a week): Text [place] to 627860    Local Crisis Services: [facility] Crisis: [phone]     Call 911 or go to my nearest emergency department.       I helped develop this safety plan and agree to use it when needed.  I have been given a copy of this plan.       Client signature _________________________________________________________________  Today s date:  11/24/2020  Adapted from Safety Plan Template 2008 Randi Poole and Robby Barba is reprinted with the express permission of the authors.  No portion of the Safety Plan Template may be reproduced without the express, written permission.  You can contact the authors at [email or web address] or [email or web address].

## 2021-07-22 NOTE — Clinical Note
Hi-- so reviewing her medications she stated she's not on an iron pill but rather a MVI with iron. It's only iron 15mg (taking roughly daily), so should she be on something higher or do we leave her be?

## 2021-07-26 ENCOUNTER — TRANSFERRED RECORDS (OUTPATIENT)
Dept: HEALTH INFORMATION MANAGEMENT | Facility: CLINIC | Age: 66
End: 2021-07-26

## 2021-07-29 ENCOUNTER — VIRTUAL VISIT (OUTPATIENT)
Dept: PSYCHOLOGY | Facility: CLINIC | Age: 66
End: 2021-07-29
Payer: MEDICARE

## 2021-07-29 DIAGNOSIS — F41.1 GAD (GENERALIZED ANXIETY DISORDER): ICD-10-CM

## 2021-07-29 DIAGNOSIS — F33.1 MAJOR DEPRESSIVE DISORDER, RECURRENT EPISODE, MODERATE WITH ANXIOUS DISTRESS (H): Primary | ICD-10-CM

## 2021-07-29 PROCEDURE — 90834 PSYTX W PT 45 MINUTES: CPT | Mod: 95 | Performed by: SOCIAL WORKER

## 2021-07-29 ASSESSMENT — ANXIETY QUESTIONNAIRES
3. WORRYING TOO MUCH ABOUT DIFFERENT THINGS: SEVERAL DAYS
8. IF YOU CHECKED OFF ANY PROBLEMS, HOW DIFFICULT HAVE THESE MADE IT FOR YOU TO DO YOUR WORK, TAKE CARE OF THINGS AT HOME, OR GET ALONG WITH OTHER PEOPLE?: VERY DIFFICULT
GAD7 TOTAL SCORE: 12
6. BECOMING EASILY ANNOYED OR IRRITABLE: MORE THAN HALF THE DAYS
7. FEELING AFRAID AS IF SOMETHING AWFUL MIGHT HAPPEN: SEVERAL DAYS
4. TROUBLE RELAXING: MORE THAN HALF THE DAYS
2. NOT BEING ABLE TO STOP OR CONTROL WORRYING: MORE THAN HALF THE DAYS
GAD7 TOTAL SCORE: 12
GAD7 TOTAL SCORE: 12
1. FEELING NERVOUS, ANXIOUS, OR ON EDGE: NEARLY EVERY DAY
5. BEING SO RESTLESS THAT IT IS HARD TO SIT STILL: SEVERAL DAYS
7. FEELING AFRAID AS IF SOMETHING AWFUL MIGHT HAPPEN: SEVERAL DAYS

## 2021-07-29 ASSESSMENT — PATIENT HEALTH QUESTIONNAIRE - PHQ9
SUM OF ALL RESPONSES TO PHQ QUESTIONS 1-9: 16
SUM OF ALL RESPONSES TO PHQ QUESTIONS 1-9: 16
10. IF YOU CHECKED OFF ANY PROBLEMS, HOW DIFFICULT HAVE THESE PROBLEMS MADE IT FOR YOU TO DO YOUR WORK, TAKE CARE OF THINGS AT HOME, OR GET ALONG WITH OTHER PEOPLE: SOMEWHAT DIFFICULT

## 2021-07-29 NOTE — TELEPHONE ENCOUNTER
Spoke with patient. Relayed message from Dr. Contreras. Patient requested to cancel appointment. Confirmed appointment was cancelled. No further action needed.    Samy Merrill,   [facility]

## 2021-07-29 NOTE — PROGRESS NOTES
Progress Note    Patient Name: Randi Cleary  Date: 7/29/21         Service Type: Individual      Session Start Time: 9:30 AM  Session End Time: 10:19 AM     Session Length: 49 minutes    Session #: 49    Attendees: Client attended alone    Service Modality:  Video Visit:    Telemedicine Visit: The patient's condition can be safely assessed and treated via synchronous audio and visual telemedicine encounter.      Reason for Telemedicine Visit: Services only offered telehealth    Originating Site (Patient Location): Patient's home    Distant Site (Provider Location): Provider Remote Setting- Home Office    Consent:  The patient/guardian has verbally consented to: the potential risks and benefits of telemedicine (video visit) versus in person care; bill my insurance or make self-payment for services provided; and responsibility for payment of non-covered services.     Mode of Communication:  Video Conference via [employer]    As the provider I attest to compliance with applicable laws and regulations related to telemedicine.      Treatment Plan Last Reviewed: 6/10/21  PHQ-9 / CHAVO-7 :   PHQ 6/23/2021 7/15/2021 7/29/2021   PHQ-9 Total Score 13 13 16   Q9: Thoughts of better off dead/self-harm past 2 weeks Not at all Not at all Not at all   F/U: Thoughts of suicide or self-harm - - -   F/U: Self harm-plan - - -   F/U: Self-harm action - - -   F/U: Safety concerns - - -   Some encounter information is confidential and restricted. Go to Review Flowsheets activity to see all data.     CHAVO-7 SCORE 6/10/2021 7/15/2021 7/29/2021   Total Score 11 (moderate anxiety) 9 (mild anxiety) 12 (moderate anxiety)   Total Score 11 9 12   Some encounter information is confidential and restricted. Go to Review Flowsheets activity to see all data.         DATA  Extended Session (53+ minutes): No  Interactive Complexity: No  Crisis: No      Progress Since Last Session (Related to Symptoms / Goals /  Homework):   Symptoms: patient has let go of some things she was worried about, but is feeling low on hope     Homework: Did not complete   Get back to the pool -not done   Make healthy choices the evening before and the morning of the pool     Episode of Care Goals: Satisfactory progress - ACTION (Actively working towards change); Intervened by reinforcing change plan / affirming steps taken     Current / Ongoing Stressors and Concerns:   Patient is currently socially isolated. She has a conflictual relationship with her .  She is getting minimal physical activity.  She had recent eye surgery     Treatment Objective(s) Addressed in This Session:   Patient will increase frequency of engaging her in ADLs.  Patient will track and record at least 5 pleasant exchanges with . Patient will be able to identify at least 5 positive traits about her .  Patient will reduce level of depressive and anxious features as evidenced by reduction in score on her CHAVO-7 and PHQ-9 (scores of 15 and 16 at first measurment, respectively).     Intervention:   CBT: Person-Centered Therapy: Talked about moving forward from patient's past concerns with notes and how she's decided to let them go as it was causing her distress and distracting for her.  Much positive verbal feedback was given for recognizing her priorities and shifting her focus to priorities. Talked about these priorities, including focusing on improving her physical health. Talked about her lack of going to the pool and her use lately and how that's impacted her mental and physical health. Discussed next steps towards a healthier lifestyle.      ASSESSMENT: Current Emotional / Mental Status (status of significant symptoms):   Risk status (Self / Other harm or suicidal ideation)   Patient denies current fears or concerns for personal safety.   Patient denies current or recent suicidal ideation or behaviors.   Patient denies current or recent homicidal  ideation or behaviors.   Patient denies current or recent self injurious behavior or ideation.   Patient denies other safety concerns.   Patient reports there has been no change in risk factors since their last session.     Patient reports there has been no change in protective factors since their last session.     A safety and risk management plan has been developed including: Patient consented to co-developed safety plan.  Safety and risk management plan was completed.  Patient agreed to use safety plan should any safety concerns arise.  A copy was given to the patient. This was done on 11/24/2020 and is attached to the bottom of the note.     Appearance:   Appropriate    Eye Contact:   Fair    Psychomotor Behavior: Normal    Attitude:   Cooperative    Orientation:   All   Speech    Rate / Production: Normal/ Responsive    Volume:  Normal    Mood:    Anxious  Sad    Affect:    Worrisome    Thought Content:  Clear    Thought Form:  Coherent    Insight:    Fair      Medication Review:   No changes to current psychiatric medication(s)     Medication Compliance:   Yes     Changes in Health Issues:   None reported     Chemical Use Review:   Substance Use: increase in use.  Patient reports frequency of use most days.  Provided encouragement towards sobriety        Tobacco Use: No current tobacco use.      Diagnosis:  1. Major depressive disorder, recurrent episode, moderate with anxious distress (H)    2. CHAVO (generalized anxiety disorder)    R/O bipolar disorder  Collateral Reports Completed:   Not Applicable    PLAN: (Patient Tasks / Therapist Tasks / Other)  Get back to the pool  Go to a meeting  Make an appointment with your MICAELA Bosch    July 29, 2021                                                         ______________________________________________________________________    Treatment Plan    Patient's Name: Randi Cleary  YOB: 1955    Date: 6/10/21    DSM5 Diagnoses:  "296.32 (F33.1) Major Depressive Disorder, Recurrent Episode, Moderate With anxious distress  Psychosocial / Contextual Factors: Patient's entire family of origin has , she now has a sister-in-law and  as support.  Relationship with  is conflictual.  Patient is reporting increase in physical symptoms due to COVID-19.  Patient is off of pain medications.  WHODAS: 42    Referral / Collaboration:  Referral to another professional/service is not indicated at this time.     Anticipated number of session or this episode of care: 12-15      MeasurableTreatment Goal(s) related to diagnosis / functional impairment(s)  Goal 1: Patient will \"jumpstart, getting going with the things I need to be doing around the house as far as picking up, doing things, trying to do something every day.  Also to lessen the animosity between me and my .\"    I will know I've met my goal when my shoulders are fixed and I can see.      Objective #A (Patient Action)    Patient will increase frequency of engaging her in ADLs.  Status: Continued - Date(s): 6/10/21    Intervention(s)  Therapist will engage patient in CBT, specifically behavioral activation.    Objective #B  Patient will track and record at least 5 pleasant exchanges with . Patient will be able to identify at least 5 positive traits about her  and how he relates to her.  Status: Continued - Date(s): 6/10/21    Intervention(s)  Therapist will teach assertiveness skills and assign homework related to relationship interactions.    Objective #C  Patient will reduce level of depressive and anxious features as evidenced by reduction in score on her CHAVO-7 and PHQ-9 (scores of 15 and 16 at first measurment, respectively).  Status: Continued - Date(s):  6/10/21    Intervention(s)  Therapist will engage patient in person-centered therapy and CBT.    Patient has reviewed and agreed to the above plan.      MICAELA SLADE  Jenny 10, 2021                        " "                        Randi RIVERA Cathy Evin          SAFETY PLAN:  Step 1: Warning signs / cues (Thoughts, images, mood, situation, behavior) that a crisis may be developing:  ? Thoughts: \"I don't want to continue\" \"I am unwanted\"  ? Images: none  ? Thinking Processes: ruminating  ? Mood: anger  ? Behaviors: isolating/withdrawing , can't stop crying, not taking care of myself and not taking care of my responsibilities  ? Situations: small triggers, such as not being able to find something, or dropping something   Step 2: Coping strategies - Things I can do to take my mind off of my problems without contacting another person (relaxation technique, physical activity):  ? Distress Tolerance Strategies:  arts and crafts: drawing, play with my pet , listen to positive and upbeat music: any, change body temperature (ice pack/cold water)  and paced breathing/progressive muscle relaxation  ? Physical Activities: go for a walk, deep breathing and stretching   ? Focus on helpful thoughts:  \"You've been through this before, you can get through it again.\"  Step 3: People and social settings that provide distraction:                 Name: Carmen                            Name: Darien                           Name: Aleida       ? pool, shopping, Carmen's house, [employer]       Step 4: Remind myself of people and things that are important to me and worth living for:  Clifford Little Donna, post-COVID world, options of what could be in your future        Step 5: When I am in crisis, I can ask these people to help me use my safety plan:                 Name: Sidney  Step 6: Making the environment safe:   ? go to sleep/daydream  Step 7: Professionals or agencies I can contact during a crisis:  ? [facility] Daytime Number: [phone]  ? Suicide Prevention Lifeline: 6-181-682-TALK (4860)  ? Crisis Text Line Service (available 24 hours a day, 7 days a week): Text [place] to 446251    Local Crisis Services: [facility] Crisis: " [phone]     Call 911 or go to my nearest emergency department.       I helped develop this safety plan and agree to use it when needed.  I have been given a copy of this plan.       Client signature _________________________________________________________________  Today s date:  11/24/2020  Adapted from Safety Plan Template 2008 Randi Poole and Robby Barba is reprinted with the express permission of the authors.  No portion of the Safety Plan Template may be reproduced without the express, written permission.  You can contact the authors at [email or web address] or [email or web address].

## 2021-07-30 ASSESSMENT — PATIENT HEALTH QUESTIONNAIRE - PHQ9: SUM OF ALL RESPONSES TO PHQ QUESTIONS 1-9: 16

## 2021-07-30 ASSESSMENT — ANXIETY QUESTIONNAIRES: GAD7 TOTAL SCORE: 12

## 2021-08-04 ENCOUNTER — PATIENT OUTREACH (OUTPATIENT)
Dept: CARE COORDINATION | Facility: CLINIC | Age: 66
End: 2021-08-04

## 2021-08-04 NOTE — PROGRESS NOTES
Clinic Care Coordination Contact  [facility]/Voicemail       Clinical Data: Care Coordinator Outreach  Outreach attempted x 1.  Left message on patient's voicemail with call back information and requested return call.  Plan: Care Coordinator update goal progression  Care Coordinator will try to reach patient again in 10 business days.  8/13/2021

## 2021-08-05 ENCOUNTER — PATIENT OUTREACH (OUTPATIENT)
Dept: CARE COORDINATION | Facility: CLINIC | Age: 66
End: 2021-08-05

## 2021-08-05 NOTE — PROGRESS NOTES
Clinic Care Coordination Contact    Situation: Patient chart reviewed by care coordinator.    Background: CCC team continues to support patient with a mental health goal.     Assessment: CCC team continues to reach out to patient on monthly basis to discuss progression of her goal. Per chart review, patient continues work closely with her therapist Corine Weir on a regular basis.     Plan/Recommendations: CCC RN will continue to monitor and will be available to assist as nursing needs arise. CCC CHW will continue to reach out to patient on a monthly basis to discuss progression of her goal.     CCC RN will perform Chart Review in 45 days.

## 2021-08-09 ENCOUNTER — VIRTUAL VISIT (OUTPATIENT)
Dept: INTERNAL MEDICINE | Facility: CLINIC | Age: 66
End: 2021-08-09
Payer: MEDICARE

## 2021-08-09 ENCOUNTER — NURSE TRIAGE (OUTPATIENT)
Dept: NURSING | Facility: CLINIC | Age: 66
End: 2021-08-09

## 2021-08-09 DIAGNOSIS — J01.01 ACUTE RECURRENT MAXILLARY SINUSITIS: Primary | ICD-10-CM

## 2021-08-09 DIAGNOSIS — E11.9 TYPE 2 DIABETES MELLITUS WITHOUT COMPLICATION, WITHOUT LONG-TERM CURRENT USE OF INSULIN (H): ICD-10-CM

## 2021-08-09 DIAGNOSIS — I15.2 HYPERTENSION DUE TO ENDOCRINE DISORDER: ICD-10-CM

## 2021-08-09 PROCEDURE — 99214 OFFICE O/P EST MOD 30 MIN: CPT | Mod: 95 | Performed by: INTERNAL MEDICINE

## 2021-08-09 RX ORDER — AZITHROMYCIN 500 MG/1
500 TABLET, FILM COATED ORAL DAILY
Qty: 7 TABLET | Refills: 0 | Status: SHIPPED | OUTPATIENT
Start: 2021-08-09 | End: 2021-08-16

## 2021-08-09 RX ORDER — PREDNISONE 20 MG/1
20 TABLET ORAL DAILY
Qty: 7 TABLET | Refills: 0 | Status: SHIPPED | OUTPATIENT
Start: 2021-08-09 | End: 2021-08-16

## 2021-08-09 RX ORDER — TRIAMTERENE/HYDROCHLOROTHIAZID 37.5-25 MG
1 TABLET ORAL DAILY
Qty: 90 TABLET | Refills: 3 | Status: SHIPPED | OUTPATIENT
Start: 2021-08-09 | End: 2021-08-24

## 2021-08-09 NOTE — PROGRESS NOTES
Randi is a 65 year old female being evaluated via a billable phone visit, and would like to be contacted via the following  Home number on file [phone] (home)     ASSESSMENT:  DX: 1. Acute recurrent maxillary sinusitis  Consider etiology of cough as persisting sinusitis, medication side effect, acid reflux.  Some degree of bronchospasm.  Doxycycline and prednisone minimally effective in June  - azithromycin (ZITHROMAX) 500 MG tablet; Take 1 tablet (500 mg) by mouth daily for 7 days  Dispense: 7 tablet; Refill: 0  - predniSONE (DELTASONE) 20 MG tablet; Take 1 tablet (20 mg) by mouth daily for 7 days  Dispense: 7 tablet; Refill: 0    2. Hypertension due to endocrine disorder  Well-controlled.  Trial of thiazide diuretic to replace losartan  - triamterene-HCTZ (MAXZIDE-25) 37.5-25 MG tablet; Take 1 tablet by mouth daily  Dispense: 90 tablet; Refill: 3    3. Type 2 diabetes mellitus without complication, without long-term current use of insulin (H)  Stable       4.  Covid.  Diagnosed in June.  Cough may linger a while from this standpoint    PLAN:  Patient Instructions   Discontinue losartan    Triamterene HCTZ once daily    Continue furosemide as needed    Prednisone 20 mg daily for 7 days    Azithromycin 500 mg daily for 7 days    Course of doxycycline and prednisone done in late June  Send me an update via [employer] in a few days     Return in about 4 months (around 12/9/2021) for using a video visit.    CHIEF COMPLAINT:  Chief Complaint   Patient presents with     Cough     on going x couple months - S/P Covid     Fatigue       HISTORY OF PRESENT ILLNESS:  Randi is a 65 year old female contacting the clinic today via phone for complaints of cough.  She was diagnosed with Covid on June 4.  She recovered but called back reporting a persisting cough on June 25.  She was prescribed prednisone and doxycycline which she found minimally effective.  I also suggested that losartan could be causing side effects.  The  "cough is continued since that time.  She complains of slight headache with sinus pressure, posterior nasal drainage, but no purulence.  The cough in her chest is loose and usually minimally productive.  She does take omeprazole for acid reflux with good control.  She takes losartan for blood pressure and blood pressure has been well controlled.  No fever or shortness of breath    REVIEW OF SYSTEMS:  Peripheral edema requiring furosemide twice daily    PFSH:  Social History     Social History Narrative     Not on file         TOBACCO USE:  History   Smoking Status     Former Smoker     Packs/day: 2.50     Years: 20.00     Types: Cigarettes     Quit date: 8/1/2003   Smokeless Tobacco     Never Used       VITALS:  There were no vitals filed for this visit.  There were no vitals taken for this visit. Estimated body mass index is 38.79 kg/m  as calculated from the following:    Height as of 7/20/21: 1.626 m (5' 4\").    Weight as of 7/20/21: 102.5 kg (226 lb).    PHYSICAL EXAM:  (observations via Phone)  Coughing frequently.    MEDICATIONS  Current Outpatient Medications   Medication Sig Dispense Refill     albuterol (PROAIR HFA/PROVENTIL HFA/VENTOLIN HFA) 108 (90 Base) MCG/ACT inhaler Inhale 2 puffs into the lungs every 4 hours as needed for shortness of breath / dyspnea or wheezing 1 Inhaler 11     albuterol (PROVENTIL) (2.5 MG/3ML) 0.083% neb solution Take 1 vial (2.5 mg) by nebulization every 4 hours as needed for shortness of breath / dyspnea or wheezing 360 mL 5     azithromycin (ZITHROMAX) 500 MG tablet Take 1 tablet (500 mg) by mouth daily for 7 days 7 tablet 0     Cholecalciferol (VITAMIN D3) 250 MCG (85579 UT) TABS Take 1 tablet by mouth daily 63366/day       Cyanocobalamin (VITAMIN B-12) 5000 MCG SUBL Unknown dose. 2 or 3 sprays/day       Fluticasone-Umeclidin-Vilanterol (TRELEGY ELLIPTA) 100-62.5-25 MCG/INH oral inhaler Inhale 1 puff into the lungs daily 1 each 5     furosemide (LASIX) 20 MG tablet Take " 1 tablet (20 mg) by mouth daily 180 tablet 0     magnesium 250 MG tablet Take 1 tablet by mouth 2 times daily       medical cannabis (Patient's own supply) See Admin Instructions (The purpose of this order is to document that the patient reports taking medical cannabis.  This is not a prescription, and is not used to certify that the patient has a qualifying medical condition.)        methocarbamol (ROBAXIN) 500 MG tablet TAKE 1 TABLET BY MOUTH AT BEDTIME AS NEEDED       Multiple Vitamins-Iron (MULTIVITAMIN PLUS IRON ADULT) TABS Take 4 tablets by mouth daily 120 tablet 0     potassium chloride ER (KLOR-CON M) 20 MEQ CR tablet Take 1 tablet (20 mEq) by mouth daily 90 tablet 3     PREBIOTIC PRODUCT PO Take by mouth daily       predniSONE (DELTASONE) 20 MG tablet Take 1 tablet (20 mg) by mouth daily for 7 days 7 tablet 0     rOPINIRole (REQUIP) 2 MG tablet        SYNTHROID 150 MCG tablet Take 1 tablet (150 mcg) by mouth daily 90 tablet 4     triamterene-HCTZ (MAXZIDE-25) 37.5-25 MG tablet Take 1 tablet by mouth daily 90 tablet 3     Turmeric Curcumin 500 MG CAPS        omeprazole (PRILOSEC) 20 MG DR capsule daily         Notes summarized: Email exchange regarding treatment June 25  Labs, x-rays, cardiology, GI tests reviewed:   Recent Labs   Lab Test 07/13/21  1632 05/18/21  1022 05/14/21  1341 02/05/21  1113 01/06/21  1726   HGB 15.8*  --  16.3*   < > 17.2*     --  143  --  143   POTASSIUM 3.6  --  4.5  --  3.9   CR 0.56  --  0.69  --  0.64   A1C  --   --  5.4  --  5.9*   TSH  --  1.31 0.87   < >  --     < > = values in this interval not displayed.     New orders: No orders of the defined types were placed in this encounter.      Independent review of:  Supplemental history by:      Phone Start Time: 4:49 PM  Phone End time:  5:11 PM  Conversation plus orders: 22 minutes  Dictation time:  3 minutes    The visit lasted a total of 25 minutes     Patient would like to receive their AVS by La Ozuna  MD Vira       [facility]

## 2021-08-09 NOTE — PATIENT INSTRUCTIONS
Discontinue losartan    Triamterene HCTZ once daily    Continue furosemide as needed    Prednisone 20 mg daily for 7 days    Azithromycin 500 mg daily for 7 days    Course of doxycycline and prednisone done in late June

## 2021-08-09 NOTE — TELEPHONE ENCOUNTER
"Pt tested positive for covid 6/4/21.  Had been fully vaccinated.  Suspects getting covid \"from the pool at the [facility].\"    \"Cough was primary symptom at time of covid testing June 4, 2021.\"    \"I'm still just tired all the time.\"  \"It's just hanging on.\"  \"Like a COPD flare.\"    No fevers.  No chills.    Cough is most problematic symptom.  \"Sometimes productive.\"  Sputum \"sometimes yellow-green.\"  \"I just eat cough drops.\"  \"Otherwise I'm hacking.\"  Tight airway.    Has been using nebulizer 2-to-3x daily with good benefit.  Takes Trelegy Ellipta inhaler daily as prescribed.  Uses albuterol inhaler \"about twice a day.\"  \"This helps stop the coughing.\"    Advised virtual provider visit to determine next steps.  Pt agrees to plan.  Transferred to a  for appointment.  No same-day appt slots available for virtual provider visit.  Therefore plan is to message pt's primary clinic for advice on next steps.  PCP not in clinic today.    QUESTIONS:  Can pt be squeezed into clinic today with a covering provider?  -> Or, virtual visit?    Best phone # for pt [phone]     Thank you-    [facility]am[facility] Nurse Advisor     Reason for Disposition    [1] HIGH RISK patient (e.g., age > 64 years, diabetes, heart or lung disease, weak immune system) AND [2] new or worsening symptoms    Additional Information    Negative: SEVERE difficulty breathing (e.g., struggling for each breath, speaks in single words)    Negative: Difficult to awaken or acting confused (e.g., disoriented, slurred speech)    Negative: Bluish (or gray) lips or face now    Negative: Shock suspected (e.g., cold/pale/clammy skin, too weak to stand, low BP, rapid pulse)    Negative: Sounds like a life-threatening emergency to the triager    Negative: [1] COVID-19 exposure AND [2] has not completed COVID-19 vaccine series AND [3] no symptoms    Negative: [1] COVID-19 exposure AND [2] completed COVID-19 vaccine series (fully vaccinated) AND [3] no " symptoms    Negative: COVID-19 vaccine reaction suspected (e.g., fever, headache, muscle aches) occurring during days 1-3 after getting vaccine    Negative: COVID-19 vaccine, questions about    Negative: [1] COVID-19 vaccine series completed (fully vaccinated) in past 3 months AND [2] new-onset of COVID-19 symptoms BUT [3] no known exposure    Negative: [1] Had lab test confirmed COVID-19 infection within last 3 monthsAND [2] new-onset of COVID-19 symptoms BUT [3] no known exposure    Negative: [1] Lives with someone known to have influenza (flu test positive) AND [2] flu-like symptoms (e.g., cough, runny nose, sore throat, SOB; with or without fever)    Negative: [1] Adult with possible COVID-19 symptoms AND [2] triager concerned about severity of symptoms or other causes    Negative: COVID-19 and breastfeeding, questions about    Negative: SEVERE or constant chest pain or pressure (Exception: mild central chest pain, present only when coughing)    Negative: MODERATE difficulty breathing (e.g., speaks in phrases, SOB even at rest, pulse 100-120)    Negative: [1] Headache AND [2] stiff neck (can't touch chin to chest)    Negative: MILD difficulty breathing (e.g., minimal/no SOB at rest, SOB with walking, pulse <100)    Negative: Chest pain or pressure    Negative: Patient sounds very sick or weak to the triager    Negative: Fever > 103 F (39.4 C)    Negative: [1] Fever > 101 F (38.3 C) AND [2] age > 60 years    Negative: [1] Fever > 100.0 F (37.8 C) AND [2] bedridden (e.g., nursing home patient, CVA, chronic illness, recovering from surgery)     [facility] [place] Specific Patient Instructions - [facility] Specific Patient Instructions  COVID 19 Nurse Triage Plan/Patient Instructions    Please be aware that novel coronavirus (COVID-19) may be circulating in the community. If you develop symptoms such as fever, cough, or SOB or if you have concerns about the presence of another infection including coronavirus  (COVID-19), please contact your health care provider or visit [email or web address]      Virtual Visit with provider recommended. Reference Visit Selection Guide.    Thank you for taking steps to prevent the spread of this virus.  Limit your contact with others.  Wear a simple mask to cover your cough.  Wash your hands well and often.    Resources  [facility]: About COVID-19: [email or web address]  CDC: What to Do If You're Sick: www.cdc.gov/coronavirus/2019-ncov/about/steps-when-sick.html  CDC: Ending Home Isolation: www.cdc.gov/coronavirus/2019-ncov/hcp/disposition-in-home-patients.html   CDC: Caring for Someone: www.cdc.gov/coronavirus/2019-ncov/if-you-are-sick/care-for-someone.html   [facility]: Interim Guidance for Hospital Discharge to Home: [email or web address]/[email or web address]/hcp/hospdischarge.pdf  [facility] clinical trials (COVID-19 research studies): [email or web address]-clinical-trials   Below are the COVID-19 hotlines at the [place] Department of Health ([facility]). Interpreters are available.   For health questions: Call 452-843-0912 or 1-442.981.6564 (7 a.m. to 7 p.m.)  For questions about schools and childcare: Call 246-653-4079 or 1-709.745.8391 (7 a.m. to 7 p.m.)                     All patients will be referred to [facility] unless nurse triage assessment indicates an urgent or emergent evaluation is needed    Protocols used: CORONAVIRUS (COVID-19) DIAGNOSED OR AYXREKEAZ-P-UI 3.25

## 2021-08-11 ENCOUNTER — VIRTUAL VISIT (OUTPATIENT)
Dept: PSYCHOLOGY | Facility: CLINIC | Age: 66
End: 2021-08-11
Payer: MEDICARE

## 2021-08-11 DIAGNOSIS — F41.1 GAD (GENERALIZED ANXIETY DISORDER): ICD-10-CM

## 2021-08-11 DIAGNOSIS — F33.1 MAJOR DEPRESSIVE DISORDER, RECURRENT EPISODE, MODERATE WITH ANXIOUS DISTRESS (H): Primary | ICD-10-CM

## 2021-08-11 PROCEDURE — 90834 PSYTX W PT 45 MINUTES: CPT | Mod: 95 | Performed by: SOCIAL WORKER

## 2021-08-11 ASSESSMENT — PATIENT HEALTH QUESTIONNAIRE - PHQ9
10. IF YOU CHECKED OFF ANY PROBLEMS, HOW DIFFICULT HAVE THESE PROBLEMS MADE IT FOR YOU TO DO YOUR WORK, TAKE CARE OF THINGS AT HOME, OR GET ALONG WITH OTHER PEOPLE: VERY DIFFICULT
SUM OF ALL RESPONSES TO PHQ QUESTIONS 1-9: 11
SUM OF ALL RESPONSES TO PHQ QUESTIONS 1-9: 11

## 2021-08-11 NOTE — PROGRESS NOTES
Progress Note    Patient Name: Randi Cleary  Date: 8/11/21         Service Type: Individual      Session Start Time: 4:05 PM  Session End Time: 4:53 PM     Session Length: 48 minutes    Session #: 50    Attendees: Client attended alone    Service Modality:  Video Visit:    Telemedicine Visit: The patient's condition can be safely assessed and treated via synchronous audio and visual telemedicine encounter.      Reason for Telemedicine Visit: Services only offered telehealth    Originating Site (Patient Location): Patient's home    Distant Site (Provider Location): Provider Remote Setting- Home Office    Consent:  The patient/guardian has verbally consented to: the potential risks and benefits of telemedicine (video visit) versus in person care; bill my insurance or make self-payment for services provided; and responsibility for payment of non-covered services.     Mode of Communication:  Video Conference via [employer]    As the provider I attest to compliance with applicable laws and regulations related to telemedicine.      Treatment Plan Last Reviewed: 6/10/21  PHQ-9 / CHAVO-7 :   PHQ 7/15/2021 7/29/2021 8/11/2021   PHQ-9 Total Score 13 16 11   Q9: Thoughts of better off dead/self-harm past 2 weeks Not at all Not at all Not at all   F/U: Thoughts of suicide or self-harm - - -   F/U: Self harm-plan - - -   F/U: Self-harm action - - -   F/U: Safety concerns - - -   Some encounter information is confidential and restricted. Go to Review Flowsheets activity to see all data.     CHAVO-7 SCORE 6/10/2021 7/15/2021 7/29/2021   Total Score 11 (moderate anxiety) 9 (mild anxiety) 12 (moderate anxiety)   Total Score 11 9 12   Some encounter information is confidential and restricted. Go to Review Flowsheets activity to see all data.         DATA  Extended Session (53+ minutes): No  Interactive Complexity: No  Crisis: No      Progress Since Last Session (Related to Symptoms / Goals /  Homework):   Symptoms: patient has been feeling overwhelmed     Homework: Partially completed   Get back to the pool -not done   Go to a meeting -not done   Make an appointment with your physican -done     Episode of Care Goals: Satisfactory progress - ACTION (Actively working towards change); Intervened by reinforcing change plan / affirming steps taken     Current / Ongoing Stressors and Concerns:   Patient is currently socially isolated. She has a conflictual relationship with her .  She is getting minimal physical activity.  She had recent eye surgery     Treatment Objective(s) Addressed in This Session:   Patient will increase frequency of engaging her in ADLs.  Patient will track and record at least 5 pleasant exchanges with . Patient will be able to identify at least 5 positive traits about her .  Patient will reduce level of depressive and anxious features as evidenced by reduction in score on her CHAVO-7 and PHQ-9 (scores of 15 and 16 at first measurment, respectively).     Intervention:   CBT: Person-Centered Therapy: Patient talked about not going to the party she was looking forward to as she was overwhelmed. Talked about this being a pattern, where she realizes that she has a lot she wants to do, but can't seem to get the motivation to do it.  Discussed use and how this interacts with mental health. Reviewed progress on meetings. Talked about managing her medical symptoms and interacting with with providers.  Introduced behavioral activation. Discussed anxiety and perfectionism, talking about all or nothing thinking. Also talked about the relationship of use to symptoms.      ASSESSMENT: Current Emotional / Mental Status (status of significant symptoms):   Risk status (Self / Other harm or suicidal ideation)   Patient denies current fears or concerns for personal safety.   Patient denies current or recent suicidal ideation or behaviors.   Patient denies current or recent homicidal  ideation or behaviors.   Patient denies current or recent self injurious behavior or ideation.   Patient denies other safety concerns.   Patient reports there has been no change in risk factors since their last session.     Patient reports there has been no change in protective factors since their last session.     A safety and risk management plan has been developed including: Patient consented to co-developed safety plan.  Safety and risk management plan was completed.  Patient agreed to use safety plan should any safety concerns arise.  A copy was given to the patient. This was done on 11/24/2020 and is attached to the bottom of the note.     Appearance:   Appropriate    Eye Contact:   Fair    Psychomotor Behavior: Normal    Attitude:   Cooperative    Orientation:   All   Speech    Rate / Production: Normal/ Responsive    Volume:  Normal    Mood:    Anxious  Sad    Affect:    Worrisome    Thought Content:  Clear    Thought Form:  Coherent    Insight:    Fair      Medication Review:   No changes to current psychiatric medication(s)     Medication Compliance:   Yes     Changes in Health Issues:   None reported     Chemical Use Review:   Substance Use: decrease in use.  Patient reports frequency of use only some days, less than before.  Provided encouragement towards sobriety        Tobacco Use: No current tobacco use.      Diagnosis:  1. Major depressive disorder, recurrent episode, moderate with anxious distress (H)    2. CHAVO (generalized anxiety disorder)    R/O bipolar disorder  Collateral Reports Completed:   Not Applicable    PLAN: (Patient Tasks / Therapist Tasks / Other)  Engage in behavioral activation, 10 minutes a day of cleaning the table  Get back to the pool  Go to a meeting    MICAELA SLADE    August 11, 2021                                                         ______________________________________________________________________    Treatment Plan    Patient's Name: Randi Cleary  Date Of  "Birth: 1955    Date: 6/10/21    DSM5 Diagnoses: 296.32 (F33.1) Major Depressive Disorder, Recurrent Episode, Moderate With anxious distress  Psychosocial / Contextual Factors: Patient's entire family of origin has , she now has a sister-in-law and  as support.  Relationship with  is conflictual.  Patient is reporting increase in physical symptoms due to COVID-19.  Patient is off of pain medications.  WHODAS: 42    Referral / Collaboration:  Referral to another professional/service is not indicated at this time.     Anticipated number of session or this episode of care: 12-15      MeasurableTreatment Goal(s) related to diagnosis / functional impairment(s)  Goal 1: Patient will \"jumpstart, getting going with the things I need to be doing around the house as far as picking up, doing things, trying to do something every day.  Also to lessen the animosity between me and my .\"    I will know I've met my goal when my shoulders are fixed and I can see.      Objective #A (Patient Action)    Patient will increase frequency of engaging her in ADLs.  Status: Continued - Date(s): 6/10/21    Intervention(s)  Therapist will engage patient in CBT, specifically behavioral activation.    Objective #B  Patient will track and record at least 5 pleasant exchanges with . Patient will be able to identify at least 5 positive traits about her  and how he relates to her.  Status: Continued - Date(s): 6/10/21    Intervention(s)  Therapist will teach assertiveness skills and assign homework related to relationship interactions.    Objective #C  Patient will reduce level of depressive and anxious features as evidenced by reduction in score on her CHAVO-7 and PHQ-9 (scores of 15 and 16 at first measurment, respectively).  Status: Continued - Date(s):  6/10/21    Intervention(s)  Therapist will engage patient in person-centered therapy and CBT.    Patient has reviewed and agreed to the above plan.      B " "MICAELA BAKER JO  Jenny 10, 2021                                                Randi Cleary          SAFETY PLAN:  Step 1: Warning signs / cues (Thoughts, images, mood, situation, behavior) that a crisis may be developing:  ? Thoughts: \"I don't want to continue\" \"I am unwanted\"  ? Images: none  ? Thinking Processes: ruminating  ? Mood: anger  ? Behaviors: isolating/withdrawing , can't stop crying, not taking care of myself and not taking care of my responsibilities  ? Situations: small triggers, such as not being able to find something, or dropping something   Step 2: Coping strategies - Things I can do to take my mind off of my problems without contacting another person (relaxation technique, physical activity):  ? Distress Tolerance Strategies:  arts and crafts: drawing, play with my pet , listen to positive and upbeat music: any, change body temperature (ice pack/cold water)  and paced breathing/progressive muscle relaxation  ? Physical Activities: go for a walk, deep breathing and stretching   ? Focus on helpful thoughts:  \"You've been through this before, you can get through it again.\"  Step 3: People and social settings that provide distraction:                 Name: Carmen                            Name: Darien                           Name: Aleida       ? pool, shopping, Carmen's house, [employer]       Step 4: Remind myself of people and things that are important to me and worth living for:  Clifford Little Donna, post-COVID world, options of what could be in your future        Step 5: When I am in crisis, I can ask these people to help me use my safety plan:                 Name: Sidney  Step 6: Making the environment safe:   ? go to sleep/daydream  Step 7: Professionals or agencies I can contact during a crisis:  ? [facility] Daytime Number: [phone]  ? Suicide Prevention Lifeline: 3-119-381-CCZH (2172)  ? Crisis Text Line Service (available 24 hours a day, 7 days a week): Text [place] to " 972457    Local Crisis Services: [facility] Crisis: [phone]     Call 911 or go to my nearest emergency department.       I helped develop this safety plan and agree to use it when needed.  I have been given a copy of this plan.       Client signature _________________________________________________________________  Today s date:  11/24/2020  Adapted from Safety Plan Template 2008 Randi Poloe and Robby Barba is reprinted with the express permission of the authors.  No portion of the Safety Plan Template may be reproduced without the express, written permission.  You can contact the authors at [email or web address] or [email or web address].

## 2021-08-11 NOTE — PATIENT INSTRUCTIONS
Engage in behavioral activation, 10 minutes a day of cleaning the table  Get back to the pool  Go to a meeting

## 2021-08-12 ASSESSMENT — PATIENT HEALTH QUESTIONNAIRE - PHQ9: SUM OF ALL RESPONSES TO PHQ QUESTIONS 1-9: 11

## 2021-08-19 ENCOUNTER — VIRTUAL VISIT (OUTPATIENT)
Dept: PSYCHOLOGY | Facility: CLINIC | Age: 66
End: 2021-08-19
Payer: MEDICARE

## 2021-08-19 DIAGNOSIS — F33.1 MAJOR DEPRESSIVE DISORDER, RECURRENT EPISODE, MODERATE WITH ANXIOUS DISTRESS (H): Primary | ICD-10-CM

## 2021-08-19 DIAGNOSIS — F41.1 GAD (GENERALIZED ANXIETY DISORDER): ICD-10-CM

## 2021-08-19 PROCEDURE — 90834 PSYTX W PT 45 MINUTES: CPT | Mod: 95 | Performed by: SOCIAL WORKER

## 2021-08-19 NOTE — PROGRESS NOTES
Progress Note    Patient Name: Randi Cleary  Date: 8/19/21         Service Type: Individual      Session Start Time: 9:31 AM  Session End Time: 10:19 AM     Session Length: 48 minutes    Session #: 51    Attendees: Client attended alone    Service Modality:  Video Visit:    Telemedicine Visit: The patient's condition can be safely assessed and treated via synchronous audio and visual telemedicine encounter.      Reason for Telemedicine Visit: Services only offered telehealth    Originating Site (Patient Location): Patient's home    Distant Site (Provider Location): Provider Remote Setting- Home Office    Consent:  The patient/guardian has verbally consented to: the potential risks and benefits of telemedicine (video visit) versus in person care; bill my insurance or make self-payment for services provided; and responsibility for payment of non-covered services.     Mode of Communication:  Video Conference via [employer]    As the provider I attest to compliance with applicable laws and regulations related to telemedicine.      Treatment Plan Last Reviewed: 6/10/21  PHQ-9 / CHAVO-7 :   PHQ 7/15/2021 7/29/2021 8/11/2021   PHQ-9 Total Score 13 16 11   Q9: Thoughts of better off dead/self-harm past 2 weeks Not at all Not at all Not at all   F/U: Thoughts of suicide or self-harm - - -   F/U: Self harm-plan - - -   F/U: Self-harm action - - -   F/U: Safety concerns - - -   Some encounter information is confidential and restricted. Go to Review Flowsheets activity to see all data.     CHAVO-7 SCORE 6/10/2021 7/15/2021 7/29/2021   Total Score 11 (moderate anxiety) 9 (mild anxiety) 12 (moderate anxiety)   Total Score 11 9 12   Some encounter information is confidential and restricted. Go to Review Flowsheets activity to see all data.         DATA  Extended Session (53+ minutes): No  Interactive Complexity: No  Crisis: No      Progress Since Last Session (Related to Symptoms / Goals /  Homework):   Symptoms: Improving feeling more hopeful     Homework: Partially completed   Engage in behavioral activation, 10 minutes a day of cleaning the table -done   Get back to the pool -not done   Go to a meeting -not done, but called a friend for this support     Episode of Care Goals: Satisfactory progress - ACTION (Actively working towards change); Intervened by reinforcing change plan / affirming steps taken     Current / Ongoing Stressors and Concerns:   Patient is currently socially isolated. She has a conflictual relationship with her .  She is getting minimal physical activity.  She had recent eye surgery     Treatment Objective(s) Addressed in This Session:   Patient will increase frequency of engaging her in ADLs.  Patient will track and record at least 5 pleasant exchanges with . Patient will be able to identify at least 5 positive traits about her .  Patient will reduce level of depressive and anxious features as evidenced by reduction in score on her CHAVO-7 and PHQ-9 (scores of 15 and 16 at first measurment, respectively).     Intervention:   CBT: Person-Centered Therapy: Discussed finding an epiphany in changing patterns and making better choices for herself. Also identified working with her plants feeling motivating. Patient has made some decisions, including not going to her reunion with it being indoors. She's also made some medical decisions.  Reviewed homework and identified successes and barriers to completion.  Talked about how behavioral activation helped her.     ASSESSMENT: Current Emotional / Mental Status (status of significant symptoms):   Risk status (Self / Other harm or suicidal ideation)   Patient denies current fears or concerns for personal safety.   Patient denies current or recent suicidal ideation or behaviors.   Patient denies current or recent homicidal ideation or behaviors.   Patient denies current or recent self injurious behavior or  ideation.   Patient denies other safety concerns.   Patient reports there has been no change in risk factors since their last session.     Patient reports there has been no change in protective factors since their last session.     A safety and risk management plan has been developed including: Patient consented to co-developed safety plan.  Safety and risk management plan was completed.  Patient agreed to use safety plan should any safety concerns arise.  A copy was given to the patient. This was done on 11/24/2020 and is attached to the bottom of the note.     Appearance:   Appropriate    Eye Contact:   Fair    Psychomotor Behavior: Normal    Attitude:   Cooperative    Orientation:   All   Speech    Rate / Production: Normal/ Responsive    Volume:  Normal    Mood:    Anxious  Sad    Affect:    Worrisome    Thought Content:  Clear    Thought Form:  Coherent    Insight:    Fair      Medication Review:   No changes to current psychiatric medication(s)     Medication Compliance:   Yes     Changes in Health Issues:   None reported     Chemical Use Review:   Substance Use: decrease in use.  Patient reports frequency of use less than before, not since Saturday.  Provided encouragement towards sobriety        Tobacco Use: No current tobacco use.      Diagnosis:  1. Major depressive disorder, recurrent episode, moderate with anxious distress (H)    2. CHAVO (generalized anxiety disorder)    R/O bipolar disorder  Collateral Reports Completed:   Not Applicable    PLAN: (Patient Tasks / Therapist Tasks / Other)  Continue to engage in behavioral activation, 10 minutes a day  Get back to the pool  Go to a meeting    MICAELA SLADE    August 19, 2021                                                         ______________________________________________________________________    Treatment Plan    Patient's Name: Randi Cleary  YOB: 1955    Date: 6/10/21    DSM5 Diagnoses: 296.32 (F33.1) Major Depressive  "Disorder, Recurrent Episode, Moderate With anxious distress  Psychosocial / Contextual Factors: Patient's entire family of origin has , she now has a sister-in-law and  as support.  Relationship with  is conflictual.  Patient is reporting increase in physical symptoms due to COVID-19.  Patient is off of pain medications.  WHODAS: 42    Referral / Collaboration:  Referral to another professional/service is not indicated at this time.     Anticipated number of session or this episode of care: 12-15      MeasurableTreatment Goal(s) related to diagnosis / functional impairment(s)  Goal 1: Patient will \"jumpstart, getting going with the things I need to be doing around the house as far as picking up, doing things, trying to do something every day.  Also to lessen the animosity between me and my .\"    I will know I've met my goal when my shoulders are fixed and I can see.      Objective #A (Patient Action)    Patient will increase frequency of engaging her in ADLs.  Status: Continued - Date(s): 6/10/21    Intervention(s)  Therapist will engage patient in CBT, specifically behavioral activation.    Objective #B  Patient will track and record at least 5 pleasant exchanges with . Patient will be able to identify at least 5 positive traits about her  and how he relates to her.  Status: Continued - Date(s): 6/10/21    Intervention(s)  Therapist will teach assertiveness skills and assign homework related to relationship interactions.    Objective #C  Patient will reduce level of depressive and anxious features as evidenced by reduction in score on her CHAVO-7 and PHQ-9 (scores of 15 and 16 at first measurment, respectively).  Status: Continued - Date(s):  6/10/21    Intervention(s)  Therapist will engage patient in person-centered therapy and CBT.    Patient has reviewed and agreed to the above plan.      MICAELA SLADE  Jenny 10, 2021                                                Randi" "NICOLE Cleary          SAFETY PLAN:  Step 1: Warning signs / cues (Thoughts, images, mood, situation, behavior) that a crisis may be developing:  ? Thoughts: \"I don't want to continue\" \"I am unwanted\"  ? Images: none  ? Thinking Processes: ruminating  ? Mood: anger  ? Behaviors: isolating/withdrawing , can't stop crying, not taking care of myself and not taking care of my responsibilities  ? Situations: small triggers, such as not being able to find something, or dropping something   Step 2: Coping strategies - Things I can do to take my mind off of my problems without contacting another person (relaxation technique, physical activity):  ? Distress Tolerance Strategies:  arts and crafts: drawing, play with my pet , listen to positive and upbeat music: any, change body temperature (ice pack/cold water)  and paced breathing/progressive muscle relaxation  ? Physical Activities: go for a walk, deep breathing and stretching   ? Focus on helpful thoughts:  \"You've been through this before, you can get through it again.\"  Step 3: People and social settings that provide distraction:                 Name: Carmen                            Name: Darien                           Name: Aleida       ? pool, shopping, Carmen's house, [employer]       Step 4: Remind myself of people and things that are important to me and worth living for:  Clifford Little Donna, post-COVID world, options of what could be in your future        Step 5: When I am in crisis, I can ask these people to help me use my safety plan:                 Name: Sidney  Step 6: Making the environment safe:   ? go to sleep/daydream  Step 7: Professionals or agencies I can contact during a crisis:  ? [facility] Daytime Number: [phone]  ? Suicide Prevention Lifeline: 9-017-573-TALK (0311)  ? Crisis Text Line Service (available 24 hours a day, 7 days a week): Text [place] to 791402    Local Crisis Services: [facility] Crisis: [phone]     Call 911 or go " to my nearest emergency department.       I helped develop this safety plan and agree to use it when needed.  I have been given a copy of this plan.       Client signature _________________________________________________________________  Today s date:  11/24/2020  Adapted from Safety Plan Template 2008 Randi Poole and Robby Barba is reprinted with the express permission of the authors.  No portion of the Safety Plan Template may be reproduced without the express, written permission.  You can contact the authors at [email or web address] or [email or web address].

## 2021-08-19 NOTE — PATIENT INSTRUCTIONS
Continue to engage in behavioral activation, 10 minutes a day  Get back to the pool  Go to a meeting

## 2021-08-20 ENCOUNTER — PATIENT OUTREACH (OUTPATIENT)
Dept: CARE COORDINATION | Facility: CLINIC | Age: 66
End: 2021-08-20

## 2021-08-20 ENCOUNTER — TELEPHONE (OUTPATIENT)
Dept: CARDIOLOGY | Facility: CLINIC | Age: 66
End: 2021-08-20

## 2021-08-20 NOTE — TELEPHONE ENCOUNTER
"Patient inquiring about a prednisone prescription for a cough she's been having. Attempted to reach but pt unavailable; LM with direct number for contact. Bindu Walden RN on 8/20/2021 at 9:22 AM    Spoke with patient regarding multiple medication questions she has; states she had COVID and is have a \"tickle in her throat\" she attributes to losartan. States she is wants to address her prednisone as well. Able to schedule patient with Dr. Edwards on Sept 1 at 11:30 virtual. Bindu Walden RN on 8/20/2021 at 2:53 PM      "

## 2021-08-20 NOTE — PROGRESS NOTES
Clinic Care Coordination Contact  [facility]/Voicemail       Clinical Data: Care Coordinator Outreach  Outreach attempted x 2.  Left message on patient's voicemail with call back information and requested return call.  Plan: Care Coordinator will send unable to contact letter with care coordinator contact information via [employer]. Care Coordinator will try to reach patient again in 2 weeks= 9/3/2021

## 2021-08-24 ENCOUNTER — OFFICE VISIT (OUTPATIENT)
Dept: NEUROSURGERY | Facility: CLINIC | Age: 66
End: 2021-08-24
Payer: MEDICARE

## 2021-08-24 ENCOUNTER — OFFICE VISIT (OUTPATIENT)
Dept: INTERNAL MEDICINE | Facility: CLINIC | Age: 66
End: 2021-08-24
Payer: MEDICARE

## 2021-08-24 VITALS
HEART RATE: 90 BPM | BODY MASS INDEX: 38.58 KG/M2 | SYSTOLIC BLOOD PRESSURE: 127 MMHG | WEIGHT: 226 LBS | DIASTOLIC BLOOD PRESSURE: 84 MMHG | RESPIRATION RATE: 20 BRPM | HEIGHT: 64 IN | OXYGEN SATURATION: 95 %

## 2021-08-24 VITALS
SYSTOLIC BLOOD PRESSURE: 122 MMHG | HEIGHT: 64 IN | HEART RATE: 96 BPM | BODY MASS INDEX: 40.19 KG/M2 | OXYGEN SATURATION: 95 % | DIASTOLIC BLOOD PRESSURE: 74 MMHG | WEIGHT: 235.4 LBS

## 2021-08-24 DIAGNOSIS — N64.51 BREAST INDURATION: ICD-10-CM

## 2021-08-24 DIAGNOSIS — I10 ESSENTIAL HYPERTENSION: ICD-10-CM

## 2021-08-24 DIAGNOSIS — I27.20 PULMONARY HYPERTENSION (H): ICD-10-CM

## 2021-08-24 DIAGNOSIS — Z86.16 HISTORY OF 2019 NOVEL CORONAVIRUS DISEASE (COVID-19): ICD-10-CM

## 2021-08-24 DIAGNOSIS — R26.9 ALTERED GAIT: ICD-10-CM

## 2021-08-24 DIAGNOSIS — Z86.018 HISTORY OF PHEOCHROMOCYTOMA: ICD-10-CM

## 2021-08-24 DIAGNOSIS — E89.0 POSTABLATIVE HYPOTHYROIDISM: ICD-10-CM

## 2021-08-24 DIAGNOSIS — G99.2 MYELOPATHY CONCURRENT WITH AND DUE TO SPINAL STENOSIS OF CERVICAL REGION (H): Primary | ICD-10-CM

## 2021-08-24 DIAGNOSIS — M48.02 CERVICAL STENOSIS OF SPINAL CANAL: Primary | ICD-10-CM

## 2021-08-24 DIAGNOSIS — J42 CHRONIC BRONCHITIS, UNSPECIFIED CHRONIC BRONCHITIS TYPE (H): ICD-10-CM

## 2021-08-24 DIAGNOSIS — M48.02 MYELOPATHY CONCURRENT WITH AND DUE TO SPINAL STENOSIS OF CERVICAL REGION (H): Primary | ICD-10-CM

## 2021-08-24 DIAGNOSIS — R73.03 PREDIABETES: ICD-10-CM

## 2021-08-24 PROCEDURE — 99214 OFFICE O/P EST MOD 30 MIN: CPT | Mod: 25 | Performed by: INTERNAL MEDICINE

## 2021-08-24 PROCEDURE — 99215 OFFICE O/P EST HI 40 MIN: CPT | Performed by: SURGERY

## 2021-08-24 PROCEDURE — 90732 PPSV23 VACC 2 YRS+ SUBQ/IM: CPT | Performed by: INTERNAL MEDICINE

## 2021-08-24 PROCEDURE — G0009 ADMIN PNEUMOCOCCAL VACCINE: HCPCS | Performed by: INTERNAL MEDICINE

## 2021-08-24 RX ORDER — LOSARTAN POTASSIUM 25 MG/1
25 TABLET ORAL DAILY
Qty: 90 TABLET | Refills: 3 | Status: SHIPPED | OUTPATIENT
Start: 2021-08-24 | End: 2021-10-12

## 2021-08-24 ASSESSMENT — MIFFLIN-ST. JEOR
SCORE: 1540.13
SCORE: 1582.77
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
## 2021-08-24 NOTE — PROGRESS NOTES
"NEUROSURGERY FOLLOWUP  NOTE  Randi Cleary comes today in f/u.  Patient is a 65 yo female with recent history of right CTR who presents with neck and shoulder pain, arm numbness, hand weakness, fine motor difficulties and imbalance. MRI of her cervical spine sows multi-level cervical stenosis including moderate at C4-5, and moderate to severe at C6-7 and mild spinal canal stenosis at C5-6 and C3-4 with multi-level severe foraminal narrowing at these levels. Also has retrolisthesis of C4-5 and anterolisthesis of C7-T1. XR does not show any significant instability. We discussed in detail the risks and benefits of laminoplasty versus posterior decompression and fusion. Patient would like to avoid a fusion. She would like to return to clinic with her  and read further about the procedures.      Since our last visit, she notes her walking has worsened with increasing imbalance. She recently underwent a right CTR at [facility]. Numbness and tingling has improved her R hand but her fine motor difficulties have also increased in both hands with increased dropping objects. Also has left CTS but has not undergone release. Does not have much tinging or numbness in the hand currently.  Numbness and tingling primarily in her left arm from her left shoulder into left posterior arm. Neck pain located in  right subaxial cervical spine.  Some numbness and tingling on the right but to a lesser extent.     PHYSICAL EXAM:   Constitutional: /84   Pulse 90   Resp 20   Ht 5' 4\" (1.626 m)   Wt 226 lb (102.5 kg)   SpO2 95%   BMI 38.79 kg/m       Mental Status: A & O in no acute distress.  Affect is appropriate.  Speech is fluent.  Recent and remote memory are intact.  Attention span and concentration are normal.     Motor:  Normal bulk and tone all muscle groups of upper and lower extremities.     Sensory: Sensation intact bilaterally to light touch.      Reflexes; supinator, biceps, triceps, knee/ ankle jerk " intact- UE> LE.  no hoffmans    IMAGING:   I personally reviewed all radiographic images        CONSULTATION ASSESSMENT AND PLAN:    Since last visit, complains of worsening myelopathy symptoms. Exam unchanged. Discussed with patient and her  in detail the risks and benefits of surgery for decompression of her cervical spine.  Recommend cervical 3-6 left open door laminoplasty with left  C4-5 foraminotomy. She would like a referral to PT and robaxin prescribed for neck spasms.  She would like to wait on surgery until November. We discussed the natural history of cervical myelopathy and the tendency for patient's symptoms to progress overtime.  We also discussed if he elected not to have surgery or postpone surgery any new neurological deficits may not be reversed with the surgery at a later date.  Surgery is done to decompress the spinal cord and stabilize his symptoms with a possibility to improve his current symptoms but that is not guaranteed. They appeared to understand.     I spent more than 55 minutes in this apt, examining the pt, reviewing the scans, reviewing notes from chart, discussing treatment options with risks and benefits and coordinating care.     Angela Gregory MD      CC:     Loida Stockton  [address] [facility] [address]

## 2021-08-24 NOTE — LETTER
"    8/24/2021         RE: Randi Cleary  [address] [facility] [address]        Dear Colleague,    Thank you for referring your patient, Randi Cleary, to the [facility]. Please see a copy of my visit note below.    NEUROSURGERY FOLLOWUP  NOTE  Randi Cleary comes today in f/u.  Patient is a 65 yo female with recent history of right CTR who presents with neck and shoulder pain, arm numbness, hand weakness, fine motor difficulties and imbalance. MRI of her cervical spine sows multi-level cervical stenosis including moderate at C4-5, and moderate to severe at C6-7 and mild spinal canal stenosis at C5-6 and C3-4 with multi-level severe foraminal narrowing at these levels. Also has retrolisthesis of C4-5 and anterolisthesis of C7-T1. XR does not show any significant instability. We discussed in detail the risks and benefits of laminoplasty versus posterior decompression and fusion. Patient would like to avoid a fusion. She would like to return to clinic with her  and read further about the procedures.      Since our last visit, she notes her walking has worsened with increasing imbalance. She recently underwent a right CTR at [facility]. Numbness and tingling has improved her R hand but her fine motor difficulties have also increased in both hands with increased dropping objects. Also has left CTS but has not undergone release. Does not have much tinging or numbness in the hand currently.  Numbness and tingling primarily in her left arm from her left shoulder into left posterior arm. Neck pain located in  right subaxial cervical spine.  Some numbness and tingling on the right but to a lesser extent.     PHYSICAL EXAM:   Constitutional: /84   Pulse 90   Resp 20   Ht 5' 4\" (1.626 m)   Wt 226 lb (102.5 kg)   SpO2 95%   BMI 38.79 kg/m       Mental Status: A & O in no acute distress.  Affect is appropriate.  Speech is fluent.  Recent " and remote memory are intact.  Attention span and concentration are normal.     Motor:  Normal bulk and tone all muscle groups of upper and lower extremities.     Sensory: Sensation intact bilaterally to light touch.      Reflexes; supinator, biceps, triceps, knee/ ankle jerk intact- UE> LE.  no hoffmans    IMAGING:   I personally reviewed all radiographic images        CONSULTATION ASSESSMENT AND PLAN:    Since last visit, complains of worsening myelopathy symptoms. Exam unchanged. Discussed with patient and her  in detail the risks and benefits of surgery for decompression of her cervical spine.  Recommend cervical 3-6 left open door laminoplasty with left  C4-5 foraminotomy. She would like a referral to PT and robaxin prescribed for neck spasms.  She would like to wait on surgery until November. We discussed the natural history of cervical myelopathy and the tendency for patient's symptoms to progress overtime.  We also discussed if he elected not to have surgery or postpone surgery any new neurological deficits may not be reversed with the surgery at a later date.  Surgery is done to decompress the spinal cord and stabilize his symptoms with a possibility to improve his current symptoms but that is not guaranteed. They appeared to understand.     I spent more than 55 minutes in this apt, examining the pt, reviewing the scans, reviewing notes from chart, discussing treatment options with risks and benefits and coordinating care.     Angela Gregory MD      CC:     Loida Stockton  [address] [place] [id number]        Again, thank you for allowing me to participate in the care of your patient.        Sincerely,        Angela Gregory MD

## 2021-08-26 ENCOUNTER — VIRTUAL VISIT (OUTPATIENT)
Dept: PSYCHOLOGY | Facility: CLINIC | Age: 66
End: 2021-08-26
Payer: MEDICARE

## 2021-08-26 DIAGNOSIS — F41.1 GAD (GENERALIZED ANXIETY DISORDER): ICD-10-CM

## 2021-08-26 DIAGNOSIS — F33.1 MAJOR DEPRESSIVE DISORDER, RECURRENT EPISODE, MODERATE WITH ANXIOUS DISTRESS (H): Primary | ICD-10-CM

## 2021-08-26 PROCEDURE — 90834 PSYTX W PT 45 MINUTES: CPT | Mod: 95 | Performed by: SOCIAL WORKER

## 2021-08-26 ASSESSMENT — ANXIETY QUESTIONNAIRES
5. BEING SO RESTLESS THAT IT IS HARD TO SIT STILL: SEVERAL DAYS
6. BECOMING EASILY ANNOYED OR IRRITABLE: MORE THAN HALF THE DAYS
7. FEELING AFRAID AS IF SOMETHING AWFUL MIGHT HAPPEN: SEVERAL DAYS
8. IF YOU CHECKED OFF ANY PROBLEMS, HOW DIFFICULT HAVE THESE MADE IT FOR YOU TO DO YOUR WORK, TAKE CARE OF THINGS AT HOME, OR GET ALONG WITH OTHER PEOPLE?: SOMEWHAT DIFFICULT
4. TROUBLE RELAXING: MORE THAN HALF THE DAYS
GAD7 TOTAL SCORE: 9
2. NOT BEING ABLE TO STOP OR CONTROL WORRYING: SEVERAL DAYS
3. WORRYING TOO MUCH ABOUT DIFFERENT THINGS: SEVERAL DAYS
7. FEELING AFRAID AS IF SOMETHING AWFUL MIGHT HAPPEN: SEVERAL DAYS
GAD7 TOTAL SCORE: 9
1. FEELING NERVOUS, ANXIOUS, OR ON EDGE: SEVERAL DAYS
GAD7 TOTAL SCORE: 9

## 2021-08-26 ASSESSMENT — PATIENT HEALTH QUESTIONNAIRE - PHQ9
SUM OF ALL RESPONSES TO PHQ QUESTIONS 1-9: 11
SUM OF ALL RESPONSES TO PHQ QUESTIONS 1-9: 11
10. IF YOU CHECKED OFF ANY PROBLEMS, HOW DIFFICULT HAVE THESE PROBLEMS MADE IT FOR YOU TO DO YOUR WORK, TAKE CARE OF THINGS AT HOME, OR GET ALONG WITH OTHER PEOPLE: SOMEWHAT DIFFICULT

## 2021-08-26 NOTE — PROGRESS NOTES
Progress Note    Patient Name: Randi Cleary  Date: 8/26/21         Service Type: Individual      Session Start Time: 9:37 AM  Session End Time: 10:25 AM     Session Length: 48 minutes    Session #: 52    Attendees: Client attended alone    Service Modality:  Video Visit:    Telemedicine Visit: The patient's condition can be safely assessed and treated via synchronous audio and visual telemedicine encounter.      Reason for Telemedicine Visit: Services only offered telehealth    Originating Site (Patient Location): Patient's home    Distant Site (Provider Location): Provider Remote Setting- Home Office    Consent:  The patient/guardian has verbally consented to: the potential risks and benefits of telemedicine (video visit) versus in person care; bill my insurance or make self-payment for services provided; and responsibility for payment of non-covered services.     Mode of Communication:  Video Conference via [employer]    As the provider I attest to compliance with applicable laws and regulations related to telemedicine.      Treatment Plan Last Reviewed: 6/10/21  PHQ-9 / CHAVO-7 :   PHQ 7/29/2021 8/11/2021 8/26/2021   PHQ-9 Total Score 16 11 11   Q9: Thoughts of better off dead/self-harm past 2 weeks Not at all Not at all Not at all   F/U: Thoughts of suicide or self-harm - - -   F/U: Self harm-plan - - -   F/U: Self-harm action - - -   F/U: Safety concerns - - -   Some encounter information is confidential and restricted. Go to Review Flowsheets activity to see all data.     CHAVO-7 SCORE 7/15/2021 7/29/2021 8/26/2021   Total Score 9 (mild anxiety) 12 (moderate anxiety) 9 (mild anxiety)   Total Score 9 12 9   Some encounter information is confidential and restricted. Go to Review Flowsheets activity to see all data.         DATA  Extended Session (53+ minutes): No  Interactive Complexity: No  Crisis: No      Progress Since Last Session (Related to Symptoms / Goals /  Homework):   Symptoms: Improving feeling well despite illness in her cat     Homework: Partially completed   Continue to engage in behavioral activation, 10 minutes a day -done most days   Get back to the pool -not done yet, cat took priority   Go to a meeting -not done yet, cat took priority     Episode of Care Goals: Satisfactory progress - ACTION (Actively working towards change); Intervened by reinforcing change plan / affirming steps taken     Current / Ongoing Stressors and Concerns:   Patient is currently socially isolated. She has a conflictual relationship with her .  She is getting minimal physical activity.  She had recent eye surgery     Treatment Objective(s) Addressed in This Session:   Patient will increase frequency of engaging her in ADLs.  Patient will track and record at least 5 pleasant exchanges with . Patient will be able to identify at least 5 positive traits about her .  Patient will reduce level of depressive and anxious features as evidenced by reduction in score on her CHAVO-7 and PHQ-9 (scores of 15 and 16 at first measurment, respectively).     Intervention:   CBT: Person-Centered Therapy: Shared about doctor's appointments. Then shared about a crisis for her cat and the financial and emotional impact of this. Reviewed homework and identified successes and barriers to completion. Discussed sleep and the need for improving this, as well as how to go about making these improvements.    ASSESSMENT: Current Emotional / Mental Status (status of significant symptoms):   Risk status (Self / Other harm or suicidal ideation)   Patient denies current fears or concerns for personal safety.   Patient denies current or recent suicidal ideation or behaviors.   Patient denies current or recent homicidal ideation or behaviors.   Patient denies current or recent self injurious behavior or ideation.   Patient denies other safety concerns.   Patient reports there has been no change in  risk factors since their last session.     Patient reports there has been no change in protective factors since their last session.     A safety and risk management plan has been developed including: Patient consented to co-developed safety plan.  Safety and risk management plan was completed.  Patient agreed to use safety plan should any safety concerns arise.  A copy was given to the patient. This was done on 11/24/2020 and is attached to the bottom of the note.     Appearance:   Appropriate    Eye Contact:   Fair    Psychomotor Behavior: Normal    Attitude:   Cooperative    Orientation:   All   Speech    Rate / Production: Normal/ Responsive    Volume:  Normal    Mood:    Anxious  Sad    Affect:    Worrisome    Thought Content:  Clear    Thought Form:  Coherent    Insight:    Fair      Medication Review:   No changes to current psychiatric medication(s)     Medication Compliance:   Yes     Changes in Health Issues:   None reported     Chemical Use Review:   Substance Use: decrease in use.  Patient reports frequency of use none in the past week.  Provided encouragement towards sobriety        Tobacco Use: No current tobacco use.      Diagnosis:  1. Major depressive disorder, recurrent episode, moderate with anxious distress (H)    2. CHAVO (generalized anxiety disorder)    R/O bipolar disorder  Collateral Reports Completed:   Not Applicable    PLAN: (Patient Tasks / Therapist Tasks / Other)  Continue to engage in behavioral activation, 10 minutes a day  Get back to the pool  Go to a meeting    At home COVID testing: [email or web address] (or [facility] COVID test)    MICAELA SLADE    August 26, 2021                                                         ______________________________________________________________________    Treatment Plan    Patient's Name: Randi Cleary  YOB: 1955    Date: 6/10/21    DSM5 Diagnoses: 296.32  "(F33.1) Major Depressive Disorder, Recurrent Episode, Moderate With anxious distress  Psychosocial / Contextual Factors: Patient's entire family of origin has , she now has a sister-in-law and  as support.  Relationship with  is conflictual.  Patient is reporting increase in physical symptoms due to COVID-19.  Patient is off of pain medications.  WHODAS: 42    Referral / Collaboration:  Referral to another professional/service is not indicated at this time.     Anticipated number of session or this episode of care: 12-15      MeasurableTreatment Goal(s) related to diagnosis / functional impairment(s)  Goal 1: Patient will \"jumpstart, getting going with the things I need to be doing around the house as far as picking up, doing things, trying to do something every day.  Also to lessen the animosity between me and my .\"    I will know I've met my goal when my shoulders are fixed and I can see.      Objective #A (Patient Action)    Patient will increase frequency of engaging her in ADLs.  Status: Continued - Date(s): 6/10/21    Intervention(s)  Therapist will engage patient in CBT, specifically behavioral activation.    Objective #B  Patient will track and record at least 5 pleasant exchanges with . Patient will be able to identify at least 5 positive traits about her  and how he relates to her.  Status: Continued - Date(s): 6/10/21    Intervention(s)  Therapist will teach assertiveness skills and assign homework related to relationship interactions.    Objective #C  Patient will reduce level of depressive and anxious features as evidenced by reduction in score on her CHAVO-7 and PHQ-9 (scores of 15 and 16 at first measurment, respectively).  Status: Continued - Date(s):  6/10/21    Intervention(s)  Therapist will engage patient in person-centered therapy and CBT.    Patient has reviewed and agreed to the above plan.      MICAELA SLADE  Jenny 10, 2021                               " "                 Randi RIVERA Cathy Evin          SAFETY PLAN:  Step 1: Warning signs / cues (Thoughts, images, mood, situation, behavior) that a crisis may be developing:  ? Thoughts: \"I don't want to continue\" \"I am unwanted\"  ? Images: none  ? Thinking Processes: ruminating  ? Mood: anger  ? Behaviors: isolating/withdrawing , can't stop crying, not taking care of myself and not taking care of my responsibilities  ? Situations: small triggers, such as not being able to find something, or dropping something   Step 2: Coping strategies - Things I can do to take my mind off of my problems without contacting another person (relaxation technique, physical activity):  ? Distress Tolerance Strategies:  arts and crafts: drawing, play with my pet , listen to positive and upbeat music: any, change body temperature (ice pack/cold water)  and paced breathing/progressive muscle relaxation  ? Physical Activities: go for a walk, deep breathing and stretching   ? Focus on helpful thoughts:  \"You've been through this before, you can get through it again.\"  Step 3: People and social settings that provide distraction:                 Name: Carmen                            Name: Darien                           Name: Aleida       ? pool, shopping, Carmen's house, [employer]       Step 4: Remind myself of people and things that are important to me and worth living for:  Clifford Ltitle Donna, post-COVID world, options of what could be in your future        Step 5: When I am in crisis, I can ask these people to help me use my safety plan:                 Name: Sidney  Step 6: Making the environment safe:   ? go to sleep/daydream  Step 7: Professionals or agencies I can contact during a crisis:  ? [facility] Daytime Number: [phone]  ? Suicide Prevention Lifeline: 9-523-365-TALK (9848)  ? Crisis Text Line Service (available 24 hours a day, 7 days a week): Text [place] to 394648    Local Crisis Services: [facility] Crisis: " [phone]     Call 911 or go to my nearest emergency department.       I helped develop this safety plan and agree to use it when needed.  I have been given a copy of this plan.       Client signature _________________________________________________________________  Today s date:  11/24/2020  Adapted from Safety Plan Template 2008 Randi Poole and Robby Barba is reprinted with the express permission of the authors.  No portion of the Safety Plan Template may be reproduced without the express, written permission.  You can contact the authors at [email or web address] or [email or web address].

## 2021-08-26 NOTE — PATIENT INSTRUCTIONS
Continue to engage in behavioral activation, 10 minutes a day  Get back to the pool  Go to a meeting    At home COVID testing: [email or web address] (or [facility] [place] home COVID test)

## 2021-08-27 ASSESSMENT — PATIENT HEALTH QUESTIONNAIRE - PHQ9: SUM OF ALL RESPONSES TO PHQ QUESTIONS 1-9: 11

## 2021-08-27 ASSESSMENT — ANXIETY QUESTIONNAIRES: GAD7 TOTAL SCORE: 9

## 2021-08-28 ENCOUNTER — HEALTH MAINTENANCE LETTER (OUTPATIENT)
Age: 66
End: 2021-08-28

## 2021-08-31 ENCOUNTER — VIRTUAL VISIT (OUTPATIENT)
Dept: PSYCHOLOGY | Facility: CLINIC | Age: 66
End: 2021-08-31
Payer: MEDICARE

## 2021-08-31 ENCOUNTER — TELEPHONE (OUTPATIENT)
Dept: NEUROSURGERY | Facility: CLINIC | Age: 66
End: 2021-08-31

## 2021-08-31 DIAGNOSIS — M54.12 CERVICAL RADICULOPATHY: Primary | ICD-10-CM

## 2021-08-31 DIAGNOSIS — F41.1 GAD (GENERALIZED ANXIETY DISORDER): ICD-10-CM

## 2021-08-31 DIAGNOSIS — F33.1 MAJOR DEPRESSIVE DISORDER, RECURRENT EPISODE, MODERATE WITH ANXIOUS DISTRESS (H): Primary | ICD-10-CM

## 2021-08-31 PROCEDURE — 90834 PSYTX W PT 45 MINUTES: CPT | Mod: 95 | Performed by: SOCIAL WORKER

## 2021-08-31 RX ORDER — METHOCARBAMOL 500 MG/1
500 TABLET, FILM COATED ORAL 4 TIMES DAILY PRN
Qty: 40 TABLET | Refills: 0 | Status: SHIPPED | OUTPATIENT
Start: 2021-08-31 | End: 2021-09-01

## 2021-08-31 NOTE — PROGRESS NOTES
Progress Note    Patient Name: Randi Cleary  Date: 8/31/21         Service Type: Individual      Session Start Time: 3:02 PM  Session End Time: 3:49 PM     Session Length: 47 minutes    Session #: 53    Attendees: Client attended alone    Service Modality:  Video Visit:    Telemedicine Visit: The patient's condition can be safely assessed and treated via synchronous audio and visual telemedicine encounter.      Reason for Telemedicine Visit: Services only offered telehealth    Originating Site (Patient Location): Patient's home    Distant Site (Provider Location): Provider Remote Setting- Home Office    Consent:  The patient/guardian has verbally consented to: the potential risks and benefits of telemedicine (video visit) versus in person care; bill my insurance or make self-payment for services provided; and responsibility for payment of non-covered services.     Mode of Communication:  Video Conference via Explore Engage    As the provider I attest to compliance with applicable laws and regulations related to telemedicine.      Treatment Plan Last Reviewed: 6/10/21  PHQ-9 / CHAVO-7 :   PHQ 7/29/2021 8/11/2021 8/26/2021   PHQ-9 Total Score 16 11 11   Q9: Thoughts of better off dead/self-harm past 2 weeks Not at all Not at all Not at all   F/U: Thoughts of suicide or self-harm - - -   F/U: Self harm-plan - - -   F/U: Self-harm action - - -   F/U: Safety concerns - - -   Some encounter information is confidential and restricted. Go to Review Flowsheets activity to see all data.     CHAVO-7 SCORE 7/15/2021 7/29/2021 8/26/2021   Total Score 9 (mild anxiety) 12 (moderate anxiety) 9 (mild anxiety)   Total Score 9 12 9   Some encounter information is confidential and restricted. Go to Review Flowsheets activity to see all data.         DATA  Extended Session (53+ minutes): No  Interactive Complexity: No  Crisis: No      Progress Since Last Session (Related to Symptoms / Goals /  Homework):   Symptoms: physical pain causing a lower level of hope     Homework: Partially completed   Continue to engage in behavioral activation, 10 minutes a day -done   Get back to the pool -not done   Go to a meeting -not done     Episode of Care Goals: Satisfactory progress - ACTION (Actively working towards change); Intervened by reinforcing change plan / affirming steps taken     Current / Ongoing Stressors and Concerns:   Patient is currently socially isolated. She has a conflictual relationship with her .  She is getting minimal physical activity.  She had recent eye surgery     Treatment Objective(s) Addressed in This Session:   Patient will increase frequency of engaging her in ADLs.  Patient will track and record at least 5 pleasant exchanges with . Patient will be able to identify at least 5 positive traits about her .  Patient will reduce level of depressive and anxious features as evidenced by reduction in score on her CHAVO-7 and PHQ-9 (scores of 15 and 16 at first measurment, respectively).     Intervention:   CBT: Person-Centered Therapy: Reviewed homework and identified successes and barriers to completion.  Talked about how to continue with this and went into depth with barriers.    ASSESSMENT: Current Emotional / Mental Status (status of significant symptoms):   Risk status (Self / Other harm or suicidal ideation)   Patient denies current fears or concerns for personal safety.   Patient denies current or recent suicidal ideation or behaviors.   Patient denies current or recent homicidal ideation or behaviors.   Patient denies current or recent self injurious behavior or ideation.   Patient denies other safety concerns.   Patient reports there has been no change in risk factors since their last session.     Patient reports there has been no change in protective factors since their last session.     A safety and risk management plan has been developed including: Patient  consented to co-developed safety plan.  Safety and risk management plan was completed.  Patient agreed to use safety plan should any safety concerns arise.  A copy was given to the patient. This was done on 2020 and is attached to the bottom of the note.     Appearance:   Appropriate    Eye Contact:   Fair    Psychomotor Behavior: Normal    Attitude:   Cooperative    Orientation:   All   Speech    Rate / Production: Normal/ Responsive    Volume:  Normal    Mood:    Anxious  Sad    Affect:    Worrisome    Thought Content:  Clear    Thought Form:  Coherent    Insight:    Fair      Medication Review:   No changes to current psychiatric medication(s)     Medication Compliance:   Yes     Changes in Health Issues:   None reported     Chemical Use Review:   Substance Use: decrease in use.  Patient reports frequency of use none in the past week.  Provided encouragement towards sobriety        Tobacco Use: No current tobacco use.      Diagnosis:  1. Major depressive disorder, recurrent episode, moderate with anxious distress (H)    2. CHAVO (generalized anxiety disorder)    R/O bipolar disorder  Collateral Reports Completed:   Not Applicable    PLAN: (Patient Tasks / Therapist Tasks / Other)  Continue to engage in behavioral activation, 10 minutes a day  Get back to the pool  Go to a meeting      MICAELA SLADE    2021                                                         ______________________________________________________________________    Treatment Plan    Patient's Name: Randi Cleary  YOB: 1955    Date: 6/10/21    DSM5 Diagnoses: 296.32 (F33.1) Major Depressive Disorder, Recurrent Episode, Moderate With anxious distress  Psychosocial / Contextual Factors: Patient's entire family of origin has , she now has a sister-in-law and  as support.  Relationship with  is conflictual.  Patient is reporting increase in physical symptoms due to COVID-19.  Patient is off of  "pain medications.  WHODAS: 42    Referral / Collaboration:  Referral to another professional/service is not indicated at this time.     Anticipated number of session or this episode of care: 12-15      MeasurableTreatment Goal(s) related to diagnosis / functional impairment(s)  Goal 1: Patient will \"jumpstart, getting going with the things I need to be doing around the house as far as picking up, doing things, trying to do something every day.  Also to lessen the animosity between me and my .\"    I will know I've met my goal when my shoulders are fixed and I can see.      Objective #A (Patient Action)    Patient will increase frequency of engaging her in ADLs.  Status: Continued - Date(s): 6/10/21    Intervention(s)  Therapist will engage patient in CBT, specifically behavioral activation.    Objective #B  Patient will track and record at least 5 pleasant exchanges with . Patient will be able to identify at least 5 positive traits about her  and how he relates to her.  Status: Continued - Date(s): 6/10/21    Intervention(s)  Therapist will teach assertiveness skills and assign homework related to relationship interactions.    Objective #C  Patient will reduce level of depressive and anxious features as evidenced by reduction in score on her CHAVO-7 and PHQ-9 (scores of 15 and 16 at first measurment, respectively).  Status: Continued - Date(s):  6/10/21    Intervention(s)  Therapist will engage patient in person-centered therapy and CBT.    Patient has reviewed and agreed to the above plan.      MICAELA SLADE  Jenny 10, 2021                                                Randi Cleary          SAFETY PLAN:  Step 1: Warning signs / cues (Thoughts, images, mood, situation, behavior) that a crisis may be developing:  ? Thoughts: \"I don't want to continue\" \"I am unwanted\"  ? Images: none  ? Thinking Processes: ruminating  ? Mood: anger  ? Behaviors: isolating/withdrawing , can't stop crying, " "not taking care of myself and not taking care of my responsibilities  ? Situations: small triggers, such as not being able to find something, or dropping something   Step 2: Coping strategies - Things I can do to take my mind off of my problems without contacting another person (relaxation technique, physical activity):  ? Distress Tolerance Strategies:  arts and crafts: drawing, play with my pet , listen to positive and upbeat music: any, change body temperature (ice pack/cold water)  and paced breathing/progressive muscle relaxation  ? Physical Activities: go for a walk, deep breathing and stretching   ? Focus on helpful thoughts:  \"You've been through this before, you can get through it again.\"  Step 3: People and social settings that provide distraction:                 Name: Carmen                            Name: Darien                           Name: Aleida       ? pool, shopping, Carmen's house, [employer]       Step 4: Remind myself of people and things that are important to me and worth living for:  Clifford Little Donna, post-COVID world, options of what could be in your future        Step 5: When I am in crisis, I can ask these people to help me use my safety plan:                 Name: Sidney  Step 6: Making the environment safe:   ? go to sleep/daydream  Step 7: Professionals or agencies I can contact during a crisis:  ? [facility] Daytime Number: [phone]  ? Suicide Prevention Lifeline: 6-415-318-TODH (4280)  ? Crisis Text Line Service (available 24 hours a day, 7 days a week): Text [place] to 350547    Local Crisis Services: [facility] Crisis: [phone]     Call 911 or go to my nearest emergency department.       I helped develop this safety plan and agree to use it when needed.  I have been given a copy of this plan.       Client signature _________________________________________________________________  Today s date:  11/24/2020  Adapted from Safety Plan Template Lilliam Bryan " Carli Barba is reprinted with the express permission of the authors.  No portion of the Safety Plan Template may be reproduced without the express, written permission.  You can contact the authors at [email or web address] or [email or web address].

## 2021-08-31 NOTE — TELEPHONE ENCOUNTER
Patient also mentioned that she was supposed to be referred to physical therapy. Please enter a referral for PT. She would like to complete at [facility] in [place].     Patient would like a callback from nurse at [phone].

## 2021-08-31 NOTE — TELEPHONE ENCOUNTER
Orders placed for robaxin and PT per Dr. Gregory's office note on 8/24/2021.   PT referral faxed to [facility] per pt's request.     Called pt to inform her of this. She confirmed that she'd like to wait until after 10/20/2021 to proceed with surgery.     Sowmya Oshea RN

## 2021-08-31 NOTE — TELEPHONE ENCOUNTER
Patient calls regarding methocarbamol. Patient stated Dr. Gregory agreed to take over writing the prescription. Patient uses [facility] #[phone].     Please call patient if the above information is not correct and Dr. Gregory won't be the physician refilling the medication.     Please call patient at [phone] regarding any questions.

## 2021-09-01 ENCOUNTER — VIRTUAL VISIT (OUTPATIENT)
Dept: CARDIOLOGY | Facility: CLINIC | Age: 66
End: 2021-09-01
Attending: INTERNAL MEDICINE
Payer: MEDICARE

## 2021-09-01 DIAGNOSIS — I27.20 PULMONARY HYPERTENSION (H): ICD-10-CM

## 2021-09-01 DIAGNOSIS — R06.09 DYSPNEA ON EXERTION: ICD-10-CM

## 2021-09-01 DIAGNOSIS — R06.02 SHORTNESS OF BREATH: Primary | ICD-10-CM

## 2021-09-01 PROCEDURE — 99214 OFFICE O/P EST MOD 30 MIN: CPT | Mod: 95 | Performed by: INTERNAL MEDICINE

## 2021-09-01 RX ORDER — ETHACRYNIC ACID 25 MG/1
25 TABLET ORAL DAILY
Qty: 90 TABLET | Refills: 3 | Status: SHIPPED | OUTPATIENT
Start: 2021-09-01 | End: 2021-09-22

## 2021-09-01 ASSESSMENT — ENCOUNTER SYMPTOMS
DEPRESSION: 1
TREMORS: 0
NERVOUS/ANXIOUS: 1
SYNCOPE: 0
SHORTNESS OF BREATH: 1
WHEEZING: 0
LEG PAIN: 0
STIFFNESS: 1
PANIC: 1
COUGH DISTURBING SLEEP: 1
WEAKNESS: 1
SPUTUM PRODUCTION: 1
INCREASED ENERGY: 1
BREAST MASS: 1
MUSCLE CRAMPS: 0
ALTERED TEMPERATURE REGULATION: 0
LIGHT-HEADEDNESS: 1
MEMORY LOSS: 0
MYALGIAS: 1
HYPOTENSION: 0
DECREASED CONCENTRATION: 1
LOSS OF CONSCIOUSNESS: 0
POLYPHAGIA: 0
DISTURBANCES IN COORDINATION: 1
PARALYSIS: 0
HEMOPTYSIS: 0
JOINT SWELLING: 0
COUGH: 1
SINUS CONGESTION: 1
DECREASED APPETITE: 0
HYPERTENSION: 0
NECK PAIN: 1
ARTHRALGIAS: 1
EXERCISE INTOLERANCE: 1
NUMBNESS: 1
SNORES LOUDLY: 0
WEIGHT GAIN: 1
DYSPNEA ON EXERTION: 1
NAIL CHANGES: 1
BACK PAIN: 0
POLYDIPSIA: 0
BREAST PAIN: 1
TROUBLE SWALLOWING: 0
INSOMNIA: 1
NIGHT SWEATS: 0
TASTE DISTURBANCE: 0
DIZZINESS: 1
NECK MASS: 0
ORTHOPNEA: 0
HEADACHES: 1
CHILLS: 0
TINGLING: 1
SORE THROAT: 1
SPEECH CHANGE: 0
PALPITATIONS: 0
SLEEP DISTURBANCES DUE TO BREATHING: 1
POSTURAL DYSPNEA: 0
MUSCLE WEAKNESS: 1
FATIGUE: 1
HOARSE VOICE: 1
FEVER: 0
SKIN CHANGES: 0
POOR WOUND HEALING: 0
SMELL DISTURBANCE: 0
SEIZURES: 0
HALLUCINATIONS: 0
SINUS PAIN: 0
WEIGHT LOSS: 0

## 2021-09-01 NOTE — NURSING NOTE
"Orders placed and patient marked \"ready for checkout.\" Staff message sent to [facility] to follow up on lab results. Bindu Walden RN on 9/1/2021 at 12:53 PM    "

## 2021-09-01 NOTE — PROGRESS NOTES
Francisco J Edwards M.D.  Cardiovascular Medicine          Loida Stockton MD    [address] [place] [address]    [phone] (Fax)          Reina Morillo MD    [address], [place] [id number] [phone]     Problem List  1. Possible pulmonary hypertension  2. History of breast cancer              History of mastectomy              Breast reconstruction  3. Atherosclerosis with coronary calcifications  4. Hiatal hernia  5. Hepatic steatosis  6. Diverticulosis  7. KYAW with treatment  8. Psoriasis  9. Grave's disease with ablation  10. Hypertension  11. Asthma  12. Hemochromatosis,gene +  13. Bipolar disorder ?  14. Chronic pain with narcotic dependence  15. Pheochromocytoma (right surgically removed)  16. Elevated body mass index  17. Elevate hemoglobin with macrocytosis  18  Hiatal hernia  19. Chronic pain management  20. Cervical stenosis  21. COVID 19 infection    Patient is vaccinated    Plan:  1. Discontinue furosemide  2. Start Edecrin 25mgs once daily  3. BMP 10 days after starting Edecrin  4. Non-exercise stress test before contemplated surgery    Normal coronary arteries.by examination    Wt Readings from Last 24 Encounters:   08/24/21 106.8 kg (235 lb 6.4 oz)   08/24/21 102.5 kg (226 lb)   07/20/21 102.5 kg (226 lb)   07/13/21 102.8 kg (226 lb 9.6 oz)   06/04/21 98.9 kg (218 lb 1.6 oz)   05/18/21 97.5 kg (215 lb)   03/12/21 102 kg (224 lb 12.8 oz)   03/04/21 99.8 kg (220 lb)   02/25/21 100.7 kg (222 lb 0.1 oz)   01/29/21 101.7 kg (224 lb 3 oz)   01/07/21 125.6 kg (277 lb)   01/06/21 105.5 kg (232 lb 9.6 oz)   10/02/20 115.4 kg (254 lb 8 oz)   09/04/20 118.4 kg (261 lb)   03/09/20 125.6 kg (277 lb)   02/20/20 123.5 kg (272 lb 3.2 oz)   02/12/20 125.6 kg (277 lb)   12/11/19 119.3 kg (263 lb)   12/06/19 118.8 kg (262 lb)   11/18/19 122.5 kg (270 lb)   10/22/19 120.2 kg (265 lb)   10/16/19 119.7 kg (264 lb)   09/25/19 118.5 kg (261 lb 3.2 oz)    09/24/19 119 kg (262 lb 4.8 oz)              Meds  Current Outpatient Medications   Medication     albuterol (PROVENTIL) (2.5 MG/3ML) 0.083% neb solution     Cholecalciferol (VITAMIN D3) 250 MCG (65868 UT) TABS     Cyanocobalamin (VITAMIN B-12) 5000 MCG SUBL     Fluticasone-Umeclidin-Vilanterol (TRELEGY ELLIPTA) 100-62.5-25 MCG/INH oral inhaler     furosemide (LASIX) 20 MG tablet     losartan (COZAAR) 25 MG tablet     magnesium 250 MG tablet     medical cannabis (Patient's own supply)     methocarbamol (ROBAXIN) 500 MG tablet     Multiple Vitamins-Iron (MULTIVITAMIN PLUS IRON ADULT) TABS     omeprazole (PRILOSEC) 20 MG DR capsule     potassium chloride ER (KLOR-CON M) 20 MEQ CR tablet     PREBIOTIC PRODUCT PO     rOPINIRole (REQUIP) 2 MG tablet     SYNTHROID 150 MCG tablet     Turmeric Curcumin 500 MG CAPS     No current facility-administered medications for this visit.       Labs  Results for RYAN GEETA ROBERTO (MRN [id number]) as of 9/1/2021 11:58   Ref. Range 7/13/2021 16:30 7/13/2021 16:32 7/13/2021 16:33   Sodium Latest Ref Range: 133 - 144 mmol/L  142    Potassium Latest Ref Range: 3.4 - 5.3 mmol/L  3.6    Chloride Latest Ref Range: 94 - 109 mmol/L  109    Carbon Dioxide Latest Ref Range: 20 - 32 mmol/L  28    Urea Nitrogen Latest Ref Range: 7 - 30 mg/dL  7    Creatinine Latest Ref Range: 0.52 - 1.04 mg/dL  0.56    GFR Estimate Latest Ref Range: >60 mL/min/1.73m2  >90    Calcium Latest Ref Range: 8.5 - 10.1 mg/dL  8.7    Anion Gap Latest Ref Range: 3 - 14 mmol/L  5    Albumin Latest Ref Range: 3.4 - 5.0 g/dL  3.4    Protein Total Latest Ref Range: 6.8 - 8.8 g/dL  7.0    Bilirubin Total Latest Ref Range: 0.2 - 1.3 mg/dL  0.6    Alkaline Phosphatase Latest Ref Range: 40 - 150 U/L  73    ALT Latest Ref Range: 0 - 50 U/L  37    AST Latest Ref Range: 0 - 45 U/L  24    Erythropoietin Latest Ref Range: 4 - 27 mU/mL  12    Ferritin Latest Ref Range: 8 - 252 ng/mL  44    Iron Latest Ref Range: 35 - 180 ug/dL  165     Iron Binding Cap Latest Ref Range: 240 - 430 ug/dL  265    Iron Saturation Index Latest Ref Range: 15 - 46 %  62 (H)    Lactate Dehydrogenase Latest Ref Range: 81 - 234 U/L   256 (H)   Glucose Latest Ref Range: 70 - 99 mg/dL  92    FIO2 Unknown 0     Ph Venous Latest Ref Range: 7.32 - 7.43  7.40     PCO2 Venous Latest Ref Range: 40 - 50 mm Hg 46     PO2 Venous Latest Ref Range: 25 - 47 mm Hg 31     Bicarbonate Venous Latest Ref Range: 21 - 28 mmol/L 28     Base Excess Venous Latest Ref Range: -7.7 - 1.9 mmol/L 2.8 (H)     WBC Latest Ref Range: 4.0 - 11.0 10e3/uL  7.0    Hemoglobin Latest Ref Range: 11.7 - 15.7 g/dL  15.8 (H)    Hematocrit Latest Ref Range: 35.0 - 47.0 %  44.8    Platelet Count Latest Ref Range: 150 - 450 10e3/uL  194    RBC Count Latest Ref Range: 3.80 - 5.20 10e6/uL  4.62    MCV Latest Ref Range: 78 - 100 fL  97    MCH Latest Ref Range: 26.5 - 33.0 pg  34.2 (H)    MCHC Latest Ref Range: 31.5 - 36.5 g/dL  35.3    RDW Latest Ref Range: 10.0 - 15.0 %  12.3    % Neutrophils Latest Units: %  69    % Lymphocytes Latest Units: %  20    % Monocytes Latest Units: %  8    % Eosinophils Latest Units: %  2    Absolute Basophils Latest Ref Range: 0.0 - 0.2 10e3/uL  0.0    % Basophils Latest Units: %  1    Absolute Eosinophils Latest Ref Range: 0.0 - 0.7 10e3/uL  0.2    Absolute Immature Granulocytes Latest Ref Range: <=0.0 10e3/uL  0.0    Absolute Lymphocytes Latest Ref Range: 0.8 - 5.3 10e3/uL  1.4    Absolute Monocytes Latest Ref Range: 0.0 - 1.3 10e3/uL  0.5    % Immature Granulocytes Latest Units: %  0    Absolute Neutrophils Latest Ref Range: 1.6 - 8.3 10e3/uL  4.8    Absolute NRBCs Latest Units: 10e3/uL  0.0    NRBCs per 100 WBC Latest Ref Range: <1 /100  0    % Retic Latest Ref Range: 0.5 - 2.0 %   2.4 (H)   Absolute Retic Latest Ref Range: 0.025 - 0.095 10e6/uL   0.112 (H)   Sed Rate Latest Ref Range: 0 - 30 mm/hr   9   Interpretation Unknown   This result conta...         Imaging     Right Heart  Pressures     2019    RA  A-wave: 16 mmHg  V-wave: 16 mmHg  Mean: 15 mmHg   HR: 88 bpm  RV  Systolic: 44 mmHg  End Diastolic: 16 mmHg  HR: 88 bpm    PA  Systolic:41 mmHg  Diasotlic: 24 mmHg  Mean: 31 mmHg  HR: 88 bpm  PA Sat: 70.8%    PCW  A-wave: 18 mmHg  V-wave: 17 mmHg  Mean: 16 mmHg  HR: 91 bpm    Cardiac Output  CO Jeannie: 5.72 L/min  CI Jeannie: 2.56 L/min/m2  CO TD: Not performed  CI TD: Not performed     Vitals  BP: 168 mmHg / 74 mmHg  SpO2: 92 %  PA Stat: 70.8 %         Echocardiogram        Name: ERIK BOSS  MRN: [id number]  : 1955  Study Date: 2019 03:04 PM  Age: 64 yrs  Gender: Female  Patient Location: [facility]  Reason For Study: Pulmonary hypertension (H)  Ordering Physician: MINO CORTES  Referring Physician: MINO CORTES  Performed By: Siobhan Dee [facility]     BSA: 2.2 m2  Height: 66 in  Weight: 259 lb  BP: 118/76 mmHg  _____________________________________________________________________________  __        Procedure  Echocardiogram with two-dimensional, color and spectral Doppler performed.  Poor acoustic windows. Optison (NDC #7488-2001-13) given intravenously.  Patient was given 3.0 ml mixture of 3 ml Optison and 6 ml saline. 6.0 ml  wasted. IV start location R Hand .  _____________________________________________________________________________  __        Interpretation Summary  1. Global and regional left ventricular function is normal with an EF of 55-  60%.  2. The right ventricle is normal size. Global right ventricular function is  mildly reduced.  3. No significant valvular disease.  4. Unable to assess pulmonary artery pressure.  5. IVC diameter <2.1 cm collapsing >50% with sniff suggests a normal RA  pressure of 3 mmHg.            Right sided filling pressures are moderately elevated.    Moderately elevated pulmonary artery hypertension.    Left sided filling pressures are mildly elevated.    Normal cardiac output level.     1. Normal coronary arteries.           Plan      Follow bedrest per protocol    Continued medical management and lifestyle modifications for cardiovascular risk factor optimizations.    Follow up with Dr. Francisco J Edwards.    Discharge today per protocol   Coronary Findings    Diagnostic  Dominance: Right  Left Anterior Descending   First Diagonal Branch   The vessel is moderate in size.     Intervention    No interventions have been documented.  Hemodynamics    Hemodynamics Phase: Baseline    RA   A-wave: 16 mmHg   V-wave: 16 mmHg   Mean: 15 mmHg    HR: 88 bpm  RV   Systolic: 44 mmHg   End Diastolic: 16 mmHg   HR: 88 bpm    PA   Systolic:41 mmHg   Diasotlic: 24 mmHg   Mean: 31 mmHg   HR: 88 bpm   PA Sat: 70.8%    PCW   A-wave: 18 mmHg   V-wave: 17 mmHg   Mean: 16 mmHg   HR: 91 bpm    Cardiac Output   CO Jeannie: 5.72 L/min   CI Jeannie: 2.56 L/min/m2   CO TD:  Not performed   CI TD:  Not performed      Vitals   BP: 168 mmHg / 74 mmHg   SpO2: 92 %   PA Stat: 70.8 %    Resistance Metric   PVR index: 5.86 QUIROZ/m2                      CC: physicians above and    Dusty Dubois M.D.  ([facility]          Answers for HPI/ROS submitted by the patient on 9/1/2021  General Symptoms: Yes  Skin Symptoms: Yes  HENT Symptoms: Yes  EYE SYMPTOMS: No  HEART SYMPTOMS: Yes  LUNG SYMPTOMS: Yes  INTESTINAL SYMPTOMS: No  URINARY SYMPTOMS: No  GYNECOLOGIC SYMPTOMS: No  BREAST SYMPTOMS: Yes  SKELETAL SYMPTOMS: Yes  BLOOD SYMPTOMS: No  NERVOUS SYSTEM SYMPTOMS: Yes  MENTAL HEALTH SYMPTOMS: Yes  Ear pain: No  Ear discharge: No  Hearing loss: No  Tinnitus: Yes  Nosebleeds: No  Congestion: Yes  Sinus pain: No  Trouble swallowing: No   Voice hoarseness: Yes  Mouth sores: No  Sore throat: Yes  Tooth pain: No  Gum tenderness: No  Bleeding gums: No  Change in taste: No  Change in sense of smell: No  Dry mouth: No  Hearing aid used: No  Neck lump: No  Fever: No  Loss of appetite: No  Weight loss: No  Weight gain: Yes  Fatigue: Yes  Night sweats:  No  Chills: No  Increased stress: Yes  Excessive hunger: No  Excessive thirst: No  Feeling hot or cold when others believe the temperature is normal: No  Loss of height: No  Post-operative complications: Yes  Surgical site pain: Yes  Hallucinations: No  Change in or Loss of Energy: Yes  Hyperactivity: No  Confusion: No  Changes in hair: No  Changes in moles/birth marks: No  Itching: No  Rashes: No  Changes in nails: Yes  Acne: No  Hair in places you don't want it: No  Change in facial hair: No  Warts: No  Non-healing sores: No  Scarring: Yes  Flaking of skin: No  Color changes of hands/feet in cold : No  Sun sensitivity: No  Skin thickening: No  Chest pain or pressure: Yes  Fast or irregular heartbeat: No  Pain in legs with walking: No  Trouble breathing while lying down: No  Fingers or toes appear blue: No  High blood pressure: No  Low blood pressure: No  Fainting: No  Murmurs: No  Pacemaker: No  Varicose veins: No  Edema or swelling: No  Wake up at night with shortness of breath: Yes  Light-headedness: Yes  Exercise intolerance: Yes  Cough: Yes  Sputum or phlegm: Yes  Coughing up blood: No  Difficulty breating or shortness of breath: Yes  Snoring: No  Wheezing: No  Difficulty breathing on exertion: Yes  Nighttime Cough: Yes  Difficulty breathing when lying flat: No  Discharge: No  Lumps: Yes  Pain: Yes  Nipple retraction: No  Back pain: No  Muscle aches: Yes  Neck pain: Yes  Swollen joints: No  Joint pain: Yes  Bone pain: No  Muscle cramps: No  Muscle weakness: Yes  Joint stiffness: Yes  Bone fracture: No  Trouble with coordination: Yes  Dizziness or trouble with balance: Yes  Fainting or black-out spells: No  Memory loss: No  Headache: Yes  Seizures: No  Speech problems: No  Tingling: Yes  Tremor: No  Weakness: Yes  Difficulty walking: Yes  Paralysis: No  Numbness: Yes  Nervous or Anxious: Yes  Depression: Yes  Trouble sleeping: Yes  Trouble thinking or concentrating: Yes  Mood changes: Yes  Panic attacks:  Yes

## 2021-09-01 NOTE — PATIENT INSTRUCTIONS
Medication Changes:  - STOP Lasix  - START Edecrin 25 mg daily    Patient Instructions:  1. Continue staying active and eat a heart healthy diet.    2. Please keep current list of medications with you at all times.    3. Remember to weigh yourself daily after voiding and before you consume any food or beverages and log the numbers.  If you have gained 2 pounds overnight or 5 pounds in a week contact us immediately for medication adjustments or further instructions.    4. **Please call us immediately if you have any syncope (fainting or passing out), chest pain, edema (swelling or weight gain), or decline in your functional status (general decline in how you are feeling).    5. Patients on Remodulin (treprostinil) or Veletri (epoprostenol): Please make sure that you have your backup pump and supplies with you at all times, your mixing instructions, and contact information for your specialty pharmacy.    Follow up Appointment Information:  - BMP lab draw 10 days after starting Edecrin  - Non-exercise stress test before contemplated surgery    We are located on the third floor of the [facility] ([facility] on the [facility].  Our address is     [address] [place] [address] on [address] [place] [address]    Thank you for allowing us to be a part of your care here at the [facility]    If you have questions or concerns please contact us at:    Eli Hilton, RN, BSN, PHN  Josephine Pratt (Scheduling,Prior Auth)  Nurse Coordinator     Clinic   Pulmonary Hypertension   Pulmonary Hypertension  [facility]  (Phone)[phone]   (Phone) [phone]        (Fax) [phone]    ** Please note that you will NOT receive a reminder call regarding your scheduled testing, reminder calls are for provider appointments only.  If you are scheduled for testing within the [facility] system you  may receive a call regarding pre-registration for billing purposes only.**     Support Group:  Pulmonary Hypertension Association  [email or web address].phassociation.org/  **Look at the Events Tab** They even have Support Groups that you can call into    [facility] PH Support Group  Second Saturday of the Month from 1-3 PM   Location: [address] [place] [address]  Leader: Corry Harvey  Phone: [phone] or [phone]  Email: [email or web address]

## 2021-09-01 NOTE — LETTER
9/1/2021      RE: Randi Cleary  [address] [facility] [address]       Dear Colleague,    Thank you for the opportunity to participate in the care of your patient, Randi Cleary, at the [facility] [place] at [facility]. Please see a copy of my visit note below.    Francisco J Edwards M.D.  Cardiovascular Medicine          Loida Stockton MD    [address] [place], [place] [id number] [phone] (Fax)          Reina Morillo MD    [address] [place] [address] [phone]     Problem List  1. Possible pulmonary hypertension  2. History of breast cancer              History of mastectomy              Breast reconstruction  3. Atherosclerosis with coronary calcifications  4. Hiatal hernia  5. Hepatic steatosis  6. Diverticulosis  7. KYAW with treatment  8. Psoriasis  9. Grave's disease with ablation  10. Hypertension  11. Asthma  12. Hemochromatosis,gene +  13. Bipolar disorder ?  14. Chronic pain with narcotic dependence  15. Pheochromocytoma (right surgically removed)  16. Elevated body mass index  17. Elevate hemoglobin with macrocytosis  18  Hiatal hernia  19. Chronic pain management  20. Cervical stenosis  21. COVID 19 infection    Patient is vaccinated    Plan:  1. Discontinue furosemide  2. Start Edecrin 25mgs once daily  3. BMP 10 days after starting Edecrin  4. Non-exercise stress test before contemplated surgery    Normal coronary arteries.by examination    Wt Readings from Last 24 Encounters:   08/24/21 106.8 kg (235 lb 6.4 oz)   08/24/21 102.5 kg (226 lb)   07/20/21 102.5 kg (226 lb)   07/13/21 102.8 kg (226 lb 9.6 oz)   06/04/21 98.9 kg (218 lb 1.6 oz)   05/18/21 97.5 kg (215 lb)   03/12/21 102 kg (224 lb 12.8 oz)   03/04/21 99.8 kg (220 lb)   02/25/21 100.7 kg (222 lb 0.1 oz)   01/29/21 101.7 kg (224 lb 3 oz)   01/07/21 125.6 kg (277 lb)   01/06/21 105.5 kg (232  lb 9.6 oz)   10/02/20 115.4 kg (254 lb 8 oz)   09/04/20 118.4 kg (261 lb)   03/09/20 125.6 kg (277 lb)   02/20/20 123.5 kg (272 lb 3.2 oz)   02/12/20 125.6 kg (277 lb)   12/11/19 119.3 kg (263 lb)   12/06/19 118.8 kg (262 lb)   11/18/19 122.5 kg (270 lb)   10/22/19 120.2 kg (265 lb)   10/16/19 119.7 kg (264 lb)   09/25/19 118.5 kg (261 lb 3.2 oz)   09/24/19 119 kg (262 lb 4.8 oz)              Meds  Current Outpatient Medications   Medication     albuterol (PROVENTIL) (2.5 MG/3ML) 0.083% neb solution     Cholecalciferol (VITAMIN D3) 250 MCG (75527 UT) TABS     Cyanocobalamin (VITAMIN B-12) 5000 MCG SUBL     Fluticasone-Umeclidin-Vilanterol (TRELEGY ELLIPTA) 100-62.5-25 MCG/INH oral inhaler     furosemide (LASIX) 20 MG tablet     losartan (COZAAR) 25 MG tablet     magnesium 250 MG tablet     medical cannabis (Patient's own supply)     methocarbamol (ROBAXIN) 500 MG tablet     Multiple Vitamins-Iron (MULTIVITAMIN PLUS IRON ADULT) TABS     omeprazole (PRILOSEC) 20 MG DR capsule     potassium chloride ER (KLOR-CON M) 20 MEQ CR tablet     PREBIOTIC PRODUCT PO     rOPINIRole (REQUIP) 2 MG tablet     SYNTHROID 150 MCG tablet     Turmeric Curcumin 500 MG CAPS     No current facility-administered medications for this visit.       Labs  Results for GEETA BOSS (MRN [id number]) as of 9/1/2021 11:58   Ref. Range 7/13/2021 16:30 7/13/2021 16:32 7/13/2021 16:33   Sodium Latest Ref Range: 133 - 144 mmol/L  142    Potassium Latest Ref Range: 3.4 - 5.3 mmol/L  3.6    Chloride Latest Ref Range: 94 - 109 mmol/L  109    Carbon Dioxide Latest Ref Range: 20 - 32 mmol/L  28    Urea Nitrogen Latest Ref Range: 7 - 30 mg/dL  7    Creatinine Latest Ref Range: 0.52 - 1.04 mg/dL  0.56    GFR Estimate Latest Ref Range: >60 mL/min/1.73m2  >90    Calcium Latest Ref Range: 8.5 - 10.1 mg/dL  8.7    Anion Gap Latest Ref Range: 3 - 14 mmol/L  5    Albumin Latest Ref Range: 3.4 - 5.0 g/dL  3.4    Protein Total Latest Ref Range: 6.8 - 8.8 g/dL  7.0     Bilirubin Total Latest Ref Range: 0.2 - 1.3 mg/dL  0.6    Alkaline Phosphatase Latest Ref Range: 40 - 150 U/L  73    ALT Latest Ref Range: 0 - 50 U/L  37    AST Latest Ref Range: 0 - 45 U/L  24    Erythropoietin Latest Ref Range: 4 - 27 mU/mL  12    Ferritin Latest Ref Range: 8 - 252 ng/mL  44    Iron Latest Ref Range: 35 - 180 ug/dL  165    Iron Binding Cap Latest Ref Range: 240 - 430 ug/dL  265    Iron Saturation Index Latest Ref Range: 15 - 46 %  62 (H)    Lactate Dehydrogenase Latest Ref Range: 81 - 234 U/L   256 (H)   Glucose Latest Ref Range: 70 - 99 mg/dL  92    FIO2 Unknown 0     Ph Venous Latest Ref Range: 7.32 - 7.43  7.40     PCO2 Venous Latest Ref Range: 40 - 50 mm Hg 46     PO2 Venous Latest Ref Range: 25 - 47 mm Hg 31     Bicarbonate Venous Latest Ref Range: 21 - 28 mmol/L 28     Base Excess Venous Latest Ref Range: -7.7 - 1.9 mmol/L 2.8 (H)     WBC Latest Ref Range: 4.0 - 11.0 10e3/uL  7.0    Hemoglobin Latest Ref Range: 11.7 - 15.7 g/dL  15.8 (H)    Hematocrit Latest Ref Range: 35.0 - 47.0 %  44.8    Platelet Count Latest Ref Range: 150 - 450 10e3/uL  194    RBC Count Latest Ref Range: 3.80 - 5.20 10e6/uL  4.62    MCV Latest Ref Range: 78 - 100 fL  97    MCH Latest Ref Range: 26.5 - 33.0 pg  34.2 (H)    MCHC Latest Ref Range: 31.5 - 36.5 g/dL  35.3    RDW Latest Ref Range: 10.0 - 15.0 %  12.3    % Neutrophils Latest Units: %  69    % Lymphocytes Latest Units: %  20    % Monocytes Latest Units: %  8    % Eosinophils Latest Units: %  2    Absolute Basophils Latest Ref Range: 0.0 - 0.2 10e3/uL  0.0    % Basophils Latest Units: %  1    Absolute Eosinophils Latest Ref Range: 0.0 - 0.7 10e3/uL  0.2    Absolute Immature Granulocytes Latest Ref Range: <=0.0 10e3/uL  0.0    Absolute Lymphocytes Latest Ref Range: 0.8 - 5.3 10e3/uL  1.4    Absolute Monocytes Latest Ref Range: 0.0 - 1.3 10e3/uL  0.5    % Immature Granulocytes Latest Units: %  0    Absolute Neutrophils Latest Ref Range: 1.6 - 8.3 10e3/uL  4.8     Absolute NRBCs Latest Units: 10e3/uL  0.0    NRBCs per 100 WBC Latest Ref Range: <1 /100  0    % Retic Latest Ref Range: 0.5 - 2.0 %   2.4 (H)   Absolute Retic Latest Ref Range: 0.025 - 0.095 10e6/uL   0.112 (H)   Sed Rate Latest Ref Range: 0 - 30 mm/hr   9   Interpretation Unknown   This result conta...         Imaging     Right Heart Pressures     2019    RA  A-wave: 16 mmHg  V-wave: 16 mmHg  Mean: 15 mmHg   HR: 88 bpm  RV  Systolic: 44 mmHg  End Diastolic: 16 mmHg  HR: 88 bpm    PA  Systolic:41 mmHg  Diasotlic: 24 mmHg  Mean: 31 mmHg  HR: 88 bpm  PA Sat: 70.8%    PCW  A-wave: 18 mmHg  V-wave: 17 mmHg  Mean: 16 mmHg  HR: 91 bpm    Cardiac Output  CO Jeannie: 5.72 L/min  CI Jeannie: 2.56 L/min/m2  CO TD: Not performed  CI TD: Not performed     Vitals  BP: 168 mmHg / 74 mmHg  SpO2: 92 %  PA Stat: 70.8 %         Echocardiogram        Name: ERIK BOSS  MRN: [id number]  : 1955  Study Date: 2019 03:04 PM  Age: 64 yrs  Gender: Female  Patient [facility]: [facility]  Reason For Study: Pulmonary hypertension (H)  Ordering Physician: MINO CORTES  Referring Physician: MINO CORTES  Performed By: Siobhan Dee [facility]     BSA: 2.2 m2  Height: 66 in  Weight: 259 lb  BP: 118/76 mmHg  _____________________________________________________________________________  __        Procedure  Echocardiogram with two-dimensional, color and spectral Doppler performed.  Poor acoustic windows. Optison (NDC #3384-1110-10) given intravenously.  Patient was given 3.0 ml mixture of 3 ml Optison and 6 ml saline. 6.0 ml  wasted. IV start location R Hand .  _____________________________________________________________________________  __        Interpretation Summary  1. Global and regional left ventricular function is normal with an EF of 55-  60%.  2. The right ventricle is normal size. Global right ventricular function is  mildly reduced.  3. No significant valvular disease.  4. Unable to assess pulmonary artery  pressure.  5. IVC diameter <2.1 cm collapsing >50% with sniff suggests a normal RA  pressure of 3 mmHg.            Right sided filling pressures are moderately elevated.    Moderately elevated pulmonary artery hypertension.    Left sided filling pressures are mildly elevated.    Normal cardiac output level.     1. Normal coronary arteries.          Plan      Follow bedrest per protocol    Continued medical management and lifestyle modifications for cardiovascular risk factor optimizations.    Follow up with Dr. Francisco J Edwards.    Discharge today per protocol   Coronary Findings    Diagnostic  Dominance: Right  Left Anterior Descending   First Diagonal Branch   The vessel is moderate in size.     Intervention    No interventions have been documented.  Hemodynamics    Hemodynamics Phase: Baseline    RA   A-wave: 16 mmHg   V-wave: 16 mmHg   Mean: 15 mmHg    HR: 88 bpm  RV   Systolic: 44 mmHg   End Diastolic: 16 mmHg   HR: 88 bpm    PA   Systolic:41 mmHg   Diasotlic: 24 mmHg   Mean: 31 mmHg   HR: 88 bpm   PA Sat: 70.8%    PCW   A-wave: 18 mmHg   V-wave: 17 mmHg   Mean: 16 mmHg   HR: 91 bpm    Cardiac Output   CO Jeannie: 5.72 L/min   CI Jeannie: 2.56 L/min/m2   CO TD:  Not performed   CI TD:  Not performed      Vitals   BP: 168 mmHg / 74 mmHg   SpO2: 92 %   PA Stat: 70.8 %    Resistance Metric   PVR index: 5.86 QUIROZ/m2                      CC: physicians above and    Dusty Dubois M.D.  (          Answers for HPI/ROS submitted by the patient on 9/1/2021  General Symptoms: Yes  Skin Symptoms: Yes  HENT Symptoms: Yes  EYE SYMPTOMS: No  HEART SYMPTOMS: Yes  LUNG SYMPTOMS: Yes  INTESTINAL SYMPTOMS: No  URINARY SYMPTOMS: No  GYNECOLOGIC SYMPTOMS: No  BREAST SYMPTOMS: Yes  SKELETAL SYMPTOMS: Yes  BLOOD SYMPTOMS: No  NERVOUS SYSTEM SYMPTOMS: Yes  MENTAL HEALTH SYMPTOMS: Yes  Ear pain: No  Ear discharge: No  Hearing loss: No  Tinnitus: Yes  Nosebleeds: No  Congestion: Yes  Sinus pain:  No  Trouble swallowing: No   Voice hoarseness: Yes  Mouth sores: No  Sore throat: Yes  Tooth pain: No  Gum tenderness: No  Bleeding gums: No  Change in taste: No  Change in sense of smell: No  Dry mouth: No  Hearing aid used: No  Neck lump: No  Fever: No  Loss of appetite: No  Weight loss: No  Weight gain: Yes  Fatigue: Yes  Night sweats: No  Chills: No  Increased stress: Yes  Excessive hunger: No  Excessive thirst: No  Feeling hot or cold when others believe the temperature is normal: No  Loss of height: No  Post-operative complications: Yes  Surgical site pain: Yes  Hallucinations: No  Change in or Loss of Energy: Yes  Hyperactivity: No  Confusion: No  Changes in hair: No  Changes in moles/birth marks: No  Itching: No  Rashes: No  Changes in nails: Yes  Acne: No  Hair in places you don't want it: No  Change in facial hair: No  Warts: No  Non-healing sores: No  Scarring: Yes  Flaking of skin: No  Color changes of hands/feet in cold : No  Sun sensitivity: No  Skin thickening: No  Chest pain or pressure: Yes  Fast or irregular heartbeat: No  Pain in legs with walking: No  Trouble breathing while lying down: No  Fingers or toes appear blue: No  High blood pressure: No  Low blood pressure: No  Fainting: No  Murmurs: No  Pacemaker: No  Varicose veins: No  Edema or swelling: No  Wake up at night with shortness of breath: Yes  Light-headedness: Yes  Exercise intolerance: Yes  Cough: Yes  Sputum or phlegm: Yes  Coughing up blood: No  Difficulty breating or shortness of breath: Yes  Snoring: No  Wheezing: No  Difficulty breathing on exertion: Yes  Nighttime Cough: Yes  Difficulty breathing when lying flat: No  Discharge: No  Lumps: Yes  Pain: Yes  Nipple retraction: No  Back pain: No  Muscle aches: Yes  Neck pain: Yes  Swollen joints: No  Joint pain: Yes  Bone pain: No  Muscle cramps: No  Muscle weakness: Yes  Joint stiffness: Yes  Bone fracture: No  Trouble with coordination: Yes  Dizziness or trouble with balance:  Yes  Fainting or black-out spells: No  Memory loss: No  Headache: Yes  Seizures: No  Speech problems: No  Tingling: Yes  Tremor: No  Weakness: Yes  Difficulty walking: Yes  Paralysis: No  Numbness: Yes  Nervous or Anxious: Yes  Depression: Yes  Trouble sleeping: Yes  Trouble thinking or concentrating: Yes  Mood changes: Yes  Panic attacks: Yes          Please do not hesitate to contact me if you have any questions/concerns.     Sincerely,     Francisco J Edwards MD

## 2021-09-01 NOTE — NURSING NOTE
"Winnie is a 66 year old who is being evaluated via a billable video visit.      How would you like to obtain your AVS? MyChart  If the video visit is dropped, the invitation should be resent by: Text to cell phone: [phone]  Will anyone else be joining your video visit? No    Vitals - Patient Reported  Weight (Patient Reported): 108 kg (238 lb)  Height (Patient Reported): 167.6 cm (5' 6\")  BMI (Based on Pt Reported Ht/Wt): 38.41  Pain Score: No Pain (0) (Pt has SOB)      Vitals were taken and medications were reconciled.   Eulalio Velarde, EMT  11:27 AM      "

## 2021-09-02 ENCOUNTER — TELEPHONE (OUTPATIENT)
Dept: CARDIOLOGY | Facility: CLINIC | Age: 66
End: 2021-09-02

## 2021-09-02 NOTE — TELEPHONE ENCOUNTER
Prior Authorization Retail Medication Request    Medication/Dose: Ethacrynic Acid 25 MG Tablets  ICD code (if different than what is on RX):    Previously Tried and Failed:    Rationale:      Insurance Name:  Medicare   Insurance ID: [id number]       Pharmacy Information (if different than what is on RX)  Name:  [facility]  Phone:  [phone] Fax: [phone]

## 2021-09-02 NOTE — TELEPHONE ENCOUNTER
PA Initiation    Medication: ethacrynic acid (EDECRIN) 25 MG tablet   Insurance Company: [employer] Part D - Phone [phone] Fax [phone]  Pharmacy Filling the Rx: [id number] [place], [place] - [facility] [address] [facility]  Filling Pharmacy Phone: [phone]  Filling Pharmacy Fax: [phone]  Start Date: 9/2/2021

## 2021-09-03 ENCOUNTER — TELEPHONE (OUTPATIENT)
Dept: CARDIOLOGY | Facility: CLINIC | Age: 66
End: 2021-09-03

## 2021-09-03 NOTE — TELEPHONE ENCOUNTER
Prior Authorization Approval    Authorization Effective Date: 6/4/2021  Authorization Expiration Date: 9/2/2022  Medication: ethacrynic acid (EDECRIN) 25 MG tablet--APPROVED  Approved Dose/Quantity:   Reference #:     Insurance Company: [employer] Part D - Phone [phone] Fax [phone]  Expected CoPay:       CoPay Card Available:      Foundation Assistance Needed:    Which Pharmacy is filling the prescription (Not needed for infusion/clinic administered): [id number] - [facility] [address] [facility]  Pharmacy Notified: Yes  Patient Notified: Yes **Instructed pharmacy to notify patient when script is ready to /ship.**

## 2021-09-03 NOTE — TELEPHONE ENCOUNTER
----- Message from Bindu Walden RN sent at 9/1/2021 12:53 PM CDT -----  Regarding: VV F/U  Zack Benitez,    Follow up plan per Dr. Edwards:  - Stop Lasix  - Start Ecedrin 25 mg daily  - BMP lab draw 10 days after starting Edecrin  - Non-exercise stress test before contemplated surgery    Orders placed and patient was marked for checkout. Please follow up on lab results.     Thank you!Guille  -----------------------------  Postponed message to arrive near date of labs. Eli Hilton RN on 9/3/2021 at 11:29 AM

## 2021-09-09 ENCOUNTER — PATIENT OUTREACH (OUTPATIENT)
Dept: NURSING | Facility: CLINIC | Age: 66
End: 2021-09-09

## 2021-09-09 ENCOUNTER — TELEPHONE (OUTPATIENT)
Dept: PULMONOLOGY | Facility: CLINIC | Age: 66
End: 2021-09-09

## 2021-09-09 ENCOUNTER — TELEPHONE (OUTPATIENT)
Facility: CLINIC | Age: 66
End: 2021-09-09

## 2021-09-09 DIAGNOSIS — J44.9 CHRONIC OBSTRUCTIVE PULMONARY DISEASE, UNSPECIFIED COPD TYPE (H): ICD-10-CM

## 2021-09-09 NOTE — TELEPHONE ENCOUNTER
Spoke with pt about scheduling f/u with Dr. Medrano for refill request. Pt was supposed to be seen in March of 2020 but stated that no one ever told her she needed that follow up just the PFT. Did inform pt that Dr. Medrano will be moving to the [place] clinic come November. Pt is fine with seeing Dr. Medrano in [place]. She is scheduled for a video visit on 12/13 with Dr. Medrano. Details confirmed.

## 2021-09-09 NOTE — PROGRESS NOTES
Clinic Care Coordination Contact    Community Health Worker Follow Up    Care Gaps:     Health Maintenance Due   Topic Date Due     HF ACTION PLAN  Never done     MICROALBUMIN  Never done     DIABETIC FOOT EXAM  Never done     ADVANCE CARE PLANNING  Never done     COPD ACTION PLAN  Never done     EYE EXAM  Never done     ZOSTER IMMUNIZATION (1 of 2) Never done     DTAP/TDAP/TD IMMUNIZATION (2 - Tdap) 05/21/2018     MEDICARE ANNUAL WELLNESS VISIT  Never done     A1C  08/14/2021     INFLUENZA VACCINE (1) 09/01/2021       Care gaps were discussed at todays outreach, no plan was made to complete    Goals:   Goals Addressed as of 9/9/2021 at 2:37 PM                    Today    8/5/21       Mental Health Management (pt-stated)   60%  50%    Added 8/4/21 by Cari Denise      Goal Statement: I would like to have my depression under better control in the next 6 months.   Date Goal set: 12/29/20   Barriers: Long history of depression   Strengths: Strong advocate for herself   Date to Achieve By: 5/29/21   Patient expressed understanding of goal: Yes   Action steps to achieve this goal:   1. I will continue to attend all scheduled appointments with Dr. Dubois and my PCP and will follow recommendations.   2. I will take my medications ordered by Dr. Dubois as prescribed.   3. I will continue to attend all scheduled appointments with my therapist, Corine.   4. I have returned to swimming at the [employer] as I know this helps me both physically and mentally.   5. I will continue to participate in activities that I enjoy and keeps me busy at home.   6. I will continue to report progress towards this goals at scheduled outreach telephone calls from the [facility] team.     1/27/2021- Updated and discussed   2/8/21 - Updated and discussed.   3/12/21 - Discussed   4/13= discussed   6/10/21 - Discussed and Updated  Discussed 9/9/21            Intervention and Education during outreach: CHW has scheduled follow up phone visit  with CC HEIKE for 9/14 @ 10am.    CHW has scheduled per-op appt for patient and PCP on 10/1/2021- however CHW will send message to care team to see if earlier appt is available. Patient is needing pre-op for her neck surgery.  Winnie is struggling with a cough that wont go away, she was diagnosed with COVID in June and is still experienced pain from her carpal tunnel surgery.  Patient has questions to see what is available after her surgery, she was told she may qualify for TCU care or maybe home services.  Patient's spouse has recently went back to work as a  so she is trying to be proactive before her surgery     CHW Plan: Will send message to care team re: earlier visit for patient.  Continue monthly outreach calls with patient  Next outreach call= 10/11/2021

## 2021-09-14 ENCOUNTER — TELEPHONE (OUTPATIENT)
Dept: NEUROSURGERY | Facility: CLINIC | Age: 66
End: 2021-09-14

## 2021-09-14 ENCOUNTER — PREP FOR PROCEDURE (OUTPATIENT)
Dept: NEUROSURGERY | Facility: CLINIC | Age: 66
End: 2021-09-14

## 2021-09-14 ENCOUNTER — HOSPITAL ENCOUNTER (INPATIENT)
Facility: CLINIC | Age: 66
Setting detail: SURGERY ADMIT
End: 2021-09-14
Attending: SURGERY | Admitting: SURGERY
Payer: MEDICARE

## 2021-09-14 ENCOUNTER — VIRTUAL VISIT (OUTPATIENT)
Dept: PSYCHOLOGY | Facility: CLINIC | Age: 66
End: 2021-09-14
Payer: MEDICARE

## 2021-09-14 DIAGNOSIS — F41.1 GAD (GENERALIZED ANXIETY DISORDER): ICD-10-CM

## 2021-09-14 DIAGNOSIS — F33.1 MAJOR DEPRESSIVE DISORDER, RECURRENT EPISODE, MODERATE WITH ANXIOUS DISTRESS (H): Primary | ICD-10-CM

## 2021-09-14 DIAGNOSIS — M48.02 SPINAL STENOSIS IN CERVICAL REGION: Primary | ICD-10-CM

## 2021-09-14 DIAGNOSIS — M54.12 CERVICAL RADICULOPATHY: ICD-10-CM

## 2021-09-14 PROCEDURE — 90834 PSYTX W PT 45 MINUTES: CPT | Mod: 95 | Performed by: SOCIAL WORKER

## 2021-09-14 ASSESSMENT — PATIENT HEALTH QUESTIONNAIRE - PHQ9
SUM OF ALL RESPONSES TO PHQ QUESTIONS 1-9: 15
10. IF YOU CHECKED OFF ANY PROBLEMS, HOW DIFFICULT HAVE THESE PROBLEMS MADE IT FOR YOU TO DO YOUR WORK, TAKE CARE OF THINGS AT HOME, OR GET ALONG WITH OTHER PEOPLE: VERY DIFFICULT
SUM OF ALL RESPONSES TO PHQ QUESTIONS 1-9: 15

## 2021-09-14 ASSESSMENT — ANXIETY QUESTIONNAIRES
2. NOT BEING ABLE TO STOP OR CONTROL WORRYING: NEARLY EVERY DAY
GAD7 TOTAL SCORE: 16
1. FEELING NERVOUS, ANXIOUS, OR ON EDGE: NEARLY EVERY DAY
7. FEELING AFRAID AS IF SOMETHING AWFUL MIGHT HAPPEN: NEARLY EVERY DAY
8. IF YOU CHECKED OFF ANY PROBLEMS, HOW DIFFICULT HAVE THESE MADE IT FOR YOU TO DO YOUR WORK, TAKE CARE OF THINGS AT HOME, OR GET ALONG WITH OTHER PEOPLE?: EXTREMELY DIFFICULT
5. BEING SO RESTLESS THAT IT IS HARD TO SIT STILL: SEVERAL DAYS
GAD7 TOTAL SCORE: 16
4. TROUBLE RELAXING: NEARLY EVERY DAY
7. FEELING AFRAID AS IF SOMETHING AWFUL MIGHT HAPPEN: NEARLY EVERY DAY
GAD7 TOTAL SCORE: 16
3. WORRYING TOO MUCH ABOUT DIFFERENT THINGS: SEVERAL DAYS
6. BECOMING EASILY ANNOYED OR IRRITABLE: MORE THAN HALF THE DAYS

## 2021-09-14 NOTE — TELEPHONE ENCOUNTER
"Pt was last seen on 8/24/2021 and is pending cervical laminoplasty surgery with Dr. Gregory.     Her symptoms are worsening: tingling down left arm is worse (has hx of 2 torn rotator cuffs), numbness in the right hand is worse, pain in the back of neck is \"expanding\", shoulder pain has increased, increased frequency in headaches, very weak when walking and increase in imbalance (using a cane for walking, encouraged her to use a walker as well). She has cancelled all future physical therapy due to her concerns for safety. She is home alone all day while her  is at work.     She called today to report she cannot get into her PCP for a pre-op until 10/1/21 and may need pulmonary clearance.     Recommended pt schedule the laminoplasty as soon as she is able and schedule her pre-op with a different provider other than her PCP if necessary.     Pt was in agreement with this. Pt transferred to surgery scheduling to assist.     *Discussed emergent symptoms that warrant visit to ED vs. Office visit - pt verbalized understanding and reported she is not experiencing any of these symptoms currently.     Sowmya Oshea RN   "

## 2021-09-14 NOTE — PROGRESS NOTES
Progress Note    Patient Name: Randi Cleary  Date: 9/14/21         Service Type: Individual      Session Start Time: 3:05 PM  Session End Time: 3:54 PM     Session Length: 49 minutes    Session #: 54    Attendees: Client attended alone    Service Modality:  Video Visit:    Telemedicine Visit: The patient's condition can be safely assessed and treated via synchronous audio and visual telemedicine encounter.      Reason for Telemedicine Visit: Services only offered telehealth    Originating Site (Patient Location): Patient's home    Distant Site (Provider Location): Provider Remote Setting- Home Office    Consent:  The patient/guardian has verbally consented to: the potential risks and benefits of telemedicine (video visit) versus in person care; bill my insurance or make self-payment for services provided; and responsibility for payment of non-covered services.     Mode of Communication:  Video Conference via [employer]    As the provider I attest to compliance with applicable laws and regulations related to telemedicine.      Treatment Plan Last Reviewed: 6/10/21  PHQ-9 / CHAVO-7 :   PHQ 8/11/2021 8/26/2021 9/14/2021   PHQ-9 Total Score 11 11 15   Q9: Thoughts of better off dead/self-harm past 2 weeks Not at all Not at all Not at all   F/U: Thoughts of suicide or self-harm - - -   F/U: Self harm-plan - - -   F/U: Self-harm action - - -   F/U: Safety concerns - - -   Some encounter information is confidential and restricted. Go to Review Flowsheets activity to see all data.     CHAVO-7 SCORE 7/29/2021 8/26/2021 9/14/2021   Total Score 12 (moderate anxiety) 9 (mild anxiety) 16 (severe anxiety)   Total Score 12 9 16   Some encounter information is confidential and restricted. Go to Review Flowsheets activity to see all data.         DATA  Extended Session (53+ minutes): No  Interactive Complexity: No  Crisis: No      Progress Since Last Session (Related to Symptoms / Goals /  Homework):   Symptoms: increasing frustration due to increasing pain     Homework: Partially completed   Continue to engage in behavioral activation, 10 minutes a day   Get back to the pool -done on Monday   Go to a meeting-not done, maintaining sobriety     Episode of Care Goals: Satisfactory progress - ACTION (Actively working towards change); Intervened by reinforcing change plan / affirming steps taken     Current / Ongoing Stressors and Concerns:   Patient is currently socially isolated. She has a conflictual relationship with her .  She is getting minimal physical activity.  She had recent eye surgery     Treatment Objective(s) Addressed in This Session:   Patient will increase frequency of engaging her in ADLs.  Patient will track and record at least 5 pleasant exchanges with . Patient will be able to identify at least 5 positive traits about her .  Patient will reduce level of depressive and anxious features as evidenced by reduction in score on her CHAVO-7 and PHQ-9 (scores of 15 and 16 at first measurment, respectively).     Intervention:   CBT: Person-Centered Therapy: Reviewed homework and identified successes and barriers to completion.  Talked about how to continue with this and went into depth with barriers. Talked about her feeling of being alone and how to work with this. Also talked about her concerns day to day.     Talked about next steps with her , including looking at what parts of yourself are active that give him the idea that you are always unsatisfied.     ASSESSMENT: Current Emotional / Mental Status (status of significant symptoms):   Risk status (Self / Other harm or suicidal ideation)   Patient denies current fears or concerns for personal safety.   Patient denies current or recent suicidal ideation or behaviors.   Patient denies current or recent homicidal ideation or behaviors.   Patient denies current or recent self injurious behavior or ideation.   Patient  denies other safety concerns.   Patient reports there has been no change in risk factors since their last session.     Patient reports there has been no change in protective factors since their last session.     A safety and risk management plan has been developed including: Patient consented to co-developed safety plan.  Safety and risk management plan was completed.  Patient agreed to use safety plan should any safety concerns arise.  A copy was given to the patient. This was done on 11/24/2020 and is attached to the bottom of the note.     Appearance:   Appropriate    Eye Contact:   Fair    Psychomotor Behavior: Normal    Attitude:   Cooperative    Orientation:   All   Speech    Rate / Production: Normal/ Responsive    Volume:  Normal    Mood:    Anxious  Sad    Affect:    Worrisome    Thought Content:  Clear    Thought Form:  Coherent    Insight:    Fair      Medication Review:   No changes to current psychiatric medication(s)     Medication Compliance:   Yes     Changes in Health Issues:   Yes: increase in pain, with associated psychological distress     Chemical Use Review:   Substance Use: decrease in use.  Patient reports frequency of use none in the three weeks (since the third week of August).  Provided encouragement towards sobriety        Tobacco Use: No current tobacco use.      Diagnosis:  1. Major depressive disorder, recurrent episode, moderate with anxious distress (H)    2. CHAVO (generalized anxiety disorder)    R/O bipolar disorder  Collateral Reports Completed:   Not Applicable    PLAN: (Patient Tasks / Therapist Tasks / Other)  Continue to engage in behavioral activation, 10 minutes a day  Get back to the pool  Go to a meeting    Next session look at parts of yourself that are activated and give the impression that you are unsatisfied.       MICAELA SLADE    September 14, 2021                                                      "    ______________________________________________________________________    Treatment Plan    Patient's Name: Randi Cleary  YOB: 1955    Date: 21    DSM5 Diagnoses: 296.32 (F33.1) Major Depressive Disorder, Recurrent Episode, Moderate With anxious distress  Psychosocial / Contextual Factors: Patient's entire family of origin has , she now has a sister-in-law and  as support.  Relationship with  is conflictual.  Patient is reporting increase in physical symptoms due to COVID-19.  Patient is off of pain medications.  WHODAS: 42    Referral / Collaboration:  Referral to another professional/service is not indicated at this time.     Anticipated number of session or this episode of care: 12-15      MeasurableTreatment Goal(s) related to diagnosis / functional impairment(s)  Goal 1: Patient will \"jumpstart, getting going with the things I need to be doing around the house as far as picking up, doing things, trying to do something every day.  Also to lessen the animosity between me and my .\"    I will know I've met my goal when my shoulders are fixed and I can see.      Objective #A (Patient Action)    Patient will increase frequency of engaging her in ADLs.  Status: Continued - Date(s): 21    Intervention(s)  Therapist will engage patient in CBT, specifically behavioral activation.    Objective #B  Patient will track and record at least 5 pleasant exchanges with . Patient will be able to identify at least 5 positive traits about her  and how he relates to her.  Status: Continued - Date(s): 21    Intervention(s)  Therapist will teach assertiveness skills and assign homework related to relationship interactions.    Objective #C  Patient will reduce level of depressive and anxious features as evidenced by reduction in score on her CHAVO-7 and PHQ-9 (scores of 15 and 16 at first measurment, respectively).  Status: Continued - Date(s):  " "9/14/21    Intervention(s)  Therapist will engage patient in person-centered therapy and CBT.    Patient has reviewed and agreed to the above plan.      MICAELA SLADE  September 14, 2021                                                Randi Cleary          SAFETY PLAN:  Step 1: Warning signs / cues (Thoughts, images, mood, situation, behavior) that a crisis may be developing:  ? Thoughts: \"I don't want to continue\" \"I am unwanted\"  ? Images: none  ? Thinking Processes: ruminating  ? Mood: anger  ? Behaviors: isolating/withdrawing , can't stop crying, not taking care of myself and not taking care of my responsibilities  ? Situations: small triggers, such as not being able to find something, or dropping something   Step 2: Coping strategies - Things I can do to take my mind off of my problems without contacting another person (relaxation technique, physical activity):  ? Distress Tolerance Strategies:  arts and crafts: drawing, play with my pet , listen to positive and upbeat music: any, change body temperature (ice pack/cold water)  and paced breathing/progressive muscle relaxation  ? Physical Activities: go for a walk, deep breathing and stretching   ? Focus on helpful thoughts:  \"You've been through this before, you can get through it again.\"  Step 3: People and social settings that provide distraction:                 Name: Carmen                            Name: Darien                           Name: Aleida       ? pool, shopping, Carmen's house, [employer]       Step 4: Remind myself of people and things that are important to me and worth living for:  Sidney, Aleida Horton, post-COVID world, options of what could be in your future        Step 5: When I am in crisis, I can ask these people to help me use my safety plan:                 Name: Sidney  Step 6: Making the environment safe:   ? go to sleep/daydream  Step 7: Professionals or agencies I can contact during a crisis:  ? [facility] Daytime " Number: [phone]  ? Suicide Prevention Lifeline: 8-760-474-TALK (9240)  ? Crisis Text Line Service (available 24 hours a day, 7 days a week): Text [place] to 098736    Local Crisis Services: [facility] Crisis: [phone]     Call 911 or go to my nearest emergency department.       I helped develop this safety plan and agree to use it when needed.  I have been given a copy of this plan.       Client signature _________________________________________________________________  Today s date:  11/24/2020  Adapted from Safety Plan Template 2008 Randi Poole and Robby Barba is reprinted with the express permission of the authors.  No portion of the Safety Plan Template may be reproduced without the express, written permission.  You can contact the authors at [email or web address] or [email or web address].

## 2021-09-15 ASSESSMENT — ANXIETY QUESTIONNAIRES: GAD7 TOTAL SCORE: 16

## 2021-09-15 ASSESSMENT — PATIENT HEALTH QUESTIONNAIRE - PHQ9: SUM OF ALL RESPONSES TO PHQ QUESTIONS 1-9: 15

## 2021-09-20 ENCOUNTER — TELEPHONE (OUTPATIENT)
Dept: INTERNAL MEDICINE | Facility: CLINIC | Age: 66
End: 2021-09-20

## 2021-09-20 ENCOUNTER — TRANSFERRED RECORDS (OUTPATIENT)
Dept: HEALTH INFORMATION MANAGEMENT | Facility: CLINIC | Age: 66
End: 2021-09-20
Payer: MEDICARE

## 2021-09-20 NOTE — TELEPHONE ENCOUNTER
Called pt back and Yared stated our fax is down.    I called pt and she will  form from [facility] tomorrow if still not working by tomorrow.

## 2021-09-20 NOTE — TELEPHONE ENCOUNTER
"Reason for Call:  Other call back    Detailed comments: pt is calling and a form was faxed over by the [facility].  She is in urgent need to have this form filled out ASAP.  She could have her teeth pulled out on Thursday.  She needs them pulled out and fixed prior to her surgery.    Form is coming from [facility].  They need that form back before she can have surgery and she is \"scheduled\" for Thursday to get her teeth fixed/pulled.    Please call pt if we don't have the form.      Phone Number Patient can be reached at: Cell number on file:    Telephone Information:   Mobile [phone]       Best Time: any    Can we leave a detailed message on this number? YES    Call taken on 9/20/2021 at 2:36 PM by Pam J. Behr    "

## 2021-09-21 ENCOUNTER — TELEPHONE (OUTPATIENT)
Dept: CARDIOLOGY | Facility: CLINIC | Age: 66
End: 2021-09-21

## 2021-09-21 ENCOUNTER — VIRTUAL VISIT (OUTPATIENT)
Dept: PSYCHOLOGY | Facility: CLINIC | Age: 66
End: 2021-09-21
Payer: MEDICARE

## 2021-09-21 ENCOUNTER — MYC MEDICAL ADVICE (OUTPATIENT)
Dept: CARDIOLOGY | Facility: CLINIC | Age: 66
End: 2021-09-21

## 2021-09-21 DIAGNOSIS — F33.1 MAJOR DEPRESSIVE DISORDER, RECURRENT EPISODE, MODERATE WITH ANXIOUS DISTRESS (H): Primary | ICD-10-CM

## 2021-09-21 DIAGNOSIS — F41.1 GAD (GENERALIZED ANXIETY DISORDER): ICD-10-CM

## 2021-09-21 DIAGNOSIS — I27.20 PULMONARY HYPERTENSION (H): Primary | ICD-10-CM

## 2021-09-21 PROCEDURE — 90834 PSYTX W PT 45 MINUTES: CPT | Mod: 95 | Performed by: SOCIAL WORKER

## 2021-09-21 NOTE — PROGRESS NOTES
Progress Note    Patient Name: Randi Cleary  Date: 9/21/21         Service Type: Individual      Session Start Time: 3:05 PM  Session End Time: 3:54 PM     Session Length: 49 minutes    Session #: 55    Attendees: Client attended alone    Service Modality:  Video Visit:    Telemedicine Visit: The patient's condition can be safely assessed and treated via synchronous audio and visual telemedicine encounter.      Reason for Telemedicine Visit: Services only offered telehealth    Originating Site (Patient Location): Patient's home    Distant Site (Provider Location): Provider Remote Setting- Home Office    Consent:  The patient/guardian has verbally consented to: the potential risks and benefits of telemedicine (video visit) versus in person care; bill my insurance or make self-payment for services provided; and responsibility for payment of non-covered services.     Mode of Communication:  Video Conference via [employer]    As the provider I attest to compliance with applicable laws and regulations related to telemedicine.      Treatment Plan Last Reviewed: 9/14/21  PHQ-9 / CHAVO-7 :   PHQ 8/11/2021 8/26/2021 9/14/2021   PHQ-9 Total Score 11 11 15   Q9: Thoughts of better off dead/self-harm past 2 weeks Not at all Not at all Not at all   F/U: Thoughts of suicide or self-harm - - -   F/U: Self harm-plan - - -   F/U: Self-harm action - - -   F/U: Safety concerns - - -   Some encounter information is confidential and restricted. Go to Review Flowsheets activity to see all data.     CHAVO-7 SCORE 7/29/2021 8/26/2021 9/14/2021   Total Score 12 (moderate anxiety) 9 (mild anxiety) 16 (severe anxiety)   Total Score 12 9 16   Some encounter information is confidential and restricted. Go to Review Flowsheets activity to see all data.         DATA  Extended Session (53+ minutes): No  Interactive Complexity: No  Crisis: No      Progress Since Last Session (Related to Symptoms / Goals /  Homework):   Symptoms: feeling rushed and in pain     Homework: Partially completed   Continue to engage in behavioral activation, 10 minutes a day -done   Get back to the pool -not done yet, scheduled Thursday   Go to a meeting -not done     Next session look at parts of yourself that are activated and give the impression that you are unsatisfied.      Episode of Care Goals: Satisfactory progress - ACTION (Actively working towards change); Intervened by reinforcing change plan / affirming steps taken     Current / Ongoing Stressors and Concerns:   Patient is currently socially isolated. She has a conflictual relationship with her .  She is getting minimal physical activity.  She had recent eye surgery     Treatment Objective(s) Addressed in This Session:   Patient will increase frequency of engaging her in ADLs.  Patient will track and record at least 5 pleasant exchanges with . Patient will be able to identify at least 5 positive traits about her .  Patient will reduce level of depressive and anxious features as evidenced by reduction in score on her CHAVO-7 and PHQ-9 (scores of 15 and 16 at first measurment, respectively).     Intervention:   CBT: Internal Family Systems Therapy: Discussed ongoing pain. Talked about the sense of hopelessness she experiences. Connected it to some experiences in her past and feeling forgotten about. Looked at how she connects with this past experiences and how to further explore them.    ASSESSMENT: Current Emotional / Mental Status (status of significant symptoms):   Risk status (Self / Other harm or suicidal ideation)   Patient denies current fears or concerns for personal safety.   Patient denies current or recent suicidal ideation or behaviors.   Patient denies current or recent homicidal ideation or behaviors.   Patient denies current or recent self injurious behavior or ideation.   Patient denies other safety concerns.   Patient reports there has been no  change in risk factors since their last session.     Patient reports there has been no change in protective factors since their last session.     A safety and risk management plan has been developed including: Patient consented to co-developed safety plan.  Safety and risk management plan was completed.  Patient agreed to use safety plan should any safety concerns arise.  A copy was given to the patient. This was done on 11/24/2020 and is attached to the bottom of the note.     Appearance:   Appropriate    Eye Contact:   Fair    Psychomotor Behavior: Normal    Attitude:   Cooperative    Orientation:   All   Speech    Rate / Production: Normal/ Responsive    Volume:  Normal    Mood:    Anxious  Sad    Affect:    Worrisome    Thought Content:  Clear    Thought Form:  Coherent    Insight:    Fair      Medication Review:   No changes to current psychiatric medication(s)     Medication Compliance:   Yes     Changes in Health Issues:   Yes: toothache, associated psychological distress     Chemical Use Review:   Substance Use: decrease in use.  Patient reports frequency of use none since the third week of August.  Provided encouragement towards sobriety        Tobacco Use: No current tobacco use.      Diagnosis:  1. Major depressive disorder, recurrent episode, moderate with anxious distress (H)    2. CHAVO (generalized anxiety disorder)    R/O bipolar disorder  Collateral Reports Completed:   Not Applicable    PLAN: (Patient Tasks / Therapist Tasks / Other)  Pay attention to physical sensations when having feelings    Continue to engage in behavioral activation, 10 minutes a day  Get back to the pool  Go to a meeting    Next session look at parts of yourself that are activated and give the impression that you are unsatisfied.       MICAELA SLADE    September 21, 2021                                                         ______________________________________________________________________    Treatment  "Plan    Patient's Name: Randi Cleary  YOB: 1955    Date: 21    DSM5 Diagnoses: 296.32 (F33.1) Major Depressive Disorder, Recurrent Episode, Moderate With anxious distress  Psychosocial / Contextual Factors: Patient's entire family of origin has , she now has a sister-in-law and  as support.  Relationship with  is conflictual.  Patient is reporting increase in physical symptoms due to COVID-19.  Patient is off of pain medications.  WHODAS: 42    Referral / Collaboration:  Referral to another professional/service is not indicated at this time.     Anticipated number of session or this episode of care: 12-15      MeasurableTreatment Goal(s) related to diagnosis / functional impairment(s)  Goal 1: Patient will \"jumpstart, getting going with the things I need to be doing around the house as far as picking up, doing things, trying to do something every day.  Also to lessen the animosity between me and my .\"    I will know I've met my goal when my shoulders are fixed and I can see.      Objective #A (Patient Action)    Patient will increase frequency of engaging her in ADLs.  Status: Continued - Date(s): 21    Intervention(s)  Therapist will engage patient in CBT, specifically behavioral activation.    Objective #B  Patient will track and record at least 5 pleasant exchanges with . Patient will be able to identify at least 5 positive traits about her  and how he relates to her.  Status: Continued - Date(s): 21    Intervention(s)  Therapist will teach assertiveness skills and assign homework related to relationship interactions.    Objective #C  Patient will reduce level of depressive and anxious features as evidenced by reduction in score on her CHAVO-7 and PHQ-9 (scores of 15 and 16 at first measurment, respectively).  Status: Continued - Date(s):  21    Intervention(s)  Therapist will engage patient in person-centered therapy and " "CBT.    Patient has reviewed and agreed to the above plan.      CRUZ BAKER COLBY  September 14, 2021                                                Randi Cleary          SAFETY PLAN:  Step 1: Warning signs / cues (Thoughts, images, mood, situation, behavior) that a crisis may be developing:  ? Thoughts: \"I don't want to continue\" \"I am unwanted\"  ? Images: none  ? Thinking Processes: ruminating  ? Mood: anger  ? Behaviors: isolating/withdrawing , can't stop crying, not taking care of myself and not taking care of my responsibilities  ? Situations: small triggers, such as not being able to find something, or dropping something   Step 2: Coping strategies - Things I can do to take my mind off of my problems without contacting another person (relaxation technique, physical activity):  ? Distress Tolerance Strategies:  arts and crafts: drawing, play with my pet , listen to positive and upbeat music: any, change body temperature (ice pack/cold water)  and paced breathing/progressive muscle relaxation  ? Physical Activities: go for a walk, deep breathing and stretching   ? Focus on helpful thoughts:  \"You've been through this before, you can get through it again.\"  Step 3: People and social settings that provide distraction:                 Name: Carmen                            Name: Darien                           Name: Aleida       ? pool, shopping, Carmen's house, [employer]       Step 4: Remind myself of people and things that are important to me and worth living for:  Clifford Little Donna, post-COVID world, options of what could be in your future        Step 5: When I am in crisis, I can ask these people to help me use my safety plan:                 Name: Sidney  Step 6: Making the environment safe:   ? go to sleep/daydream  Step 7: Professionals or agencies I can contact during a crisis:  ? [facility] Daytime Number: [phone]  ? Suicide Prevention Lifeline: 1-338-208-DVRH (6065)  ? Crisis Text " Line Service (available 24 hours a day, 7 days a week): Text [place] to 784918    Local Crisis Services: [facility] Crisis: [phone]     Call 911 or go to my nearest emergency department.       I helped develop this safety plan and agree to use it when needed.  I have been given a copy of this plan.       Client signature _________________________________________________________________  Today s date:  11/24/2020  Adapted from Safety Plan Template 2008 Randi Poole and Robby Barba is reprinted with the express permission of the authors.  No portion of the Safety Plan Template may be reproduced without the express, written permission.  You can contact the authors at [email or web address] or [email or web address].

## 2021-09-21 NOTE — TELEPHONE ENCOUNTER
"Sent patient mychart message with name/clinic &nphone number of  clinic in [place] where she could establish care with new PMD.  ------------------------  Called patient regarding labs after starting new diuretic, and she stated she had not started the new water pill because it was $400 a month.  She is in the donut hole right now. .    She is still using the lasix 20mg once daily, but  her urination is reduced.  The last few days she's been coughing & using her nebulizer.    Sh is confused as well abut this stress test Dr. Edwards wants her to have.   doesn't know who to call.  Advised her I could give her the phone number to call & schedule it.  She would like me to check with  to see if she needs it before her surgery, because her surgery is coming up and she has a lot going on.  She just found out she has to have 2 teeth pulled Thursday.    She also requested a referral to new primary MD in [facility] & or [facility], as her PMD  Is \"never available when I need her\".  Verbalized understanding and agreed with care.  Eli Hilton RN on 9/21/2021 at 12:58 PM      ----- Message from Eli Hilton RN sent at 9/17/2021  7:37 PM CDT -----  Regarding: FW: Lab f/u  Call patient about labs, I didn't see any last week.  ----- Message -----  From: Eli Hilton RN  Sent: 9/13/2021  To: Eli Hilton RN  Subject: Lab f/u                                          Supposed to have labs this week due to new diuretics.  F/u  ----- Message -----  From: Bindu Walden RN  Sent: 9/1/2021  12:53 PM CDT  To: Eli Hilton RN  Subject: VV F/U                                           Zack Benitez,    Follow up plan per Dr. Edwards:  - Stop Lasix  - Start Ecedrin 25 mg daily  - BMP lab draw 10 days after starting Edecrin  - Non-exercise stress test before contemplated surgery    Orders placed and patient was marked for checkout. Please follow up on lab results.     Thank " you!Guille

## 2021-09-22 ENCOUNTER — TELEPHONE (OUTPATIENT)
Dept: INTERNAL MEDICINE | Facility: CLINIC | Age: 66
End: 2021-09-22

## 2021-09-22 ENCOUNTER — OFFICE VISIT (OUTPATIENT)
Dept: NEUROSURGERY | Facility: CLINIC | Age: 66
End: 2021-09-22
Payer: MEDICARE

## 2021-09-22 ENCOUNTER — TRANSFERRED RECORDS (OUTPATIENT)
Dept: NEUROSURGERY | Facility: CLINIC | Age: 66
End: 2021-09-22

## 2021-09-22 VITALS
SYSTOLIC BLOOD PRESSURE: 128 MMHG | RESPIRATION RATE: 18 BRPM | HEIGHT: 64 IN | WEIGHT: 235 LBS | OXYGEN SATURATION: 95 % | BODY MASS INDEX: 40.12 KG/M2 | HEART RATE: 95 BPM | DIASTOLIC BLOOD PRESSURE: 84 MMHG

## 2021-09-22 DIAGNOSIS — G99.2 MYELOPATHY CONCURRENT WITH AND DUE TO SPINAL STENOSIS OF CERVICAL REGION (H): Primary | ICD-10-CM

## 2021-09-22 DIAGNOSIS — M48.02 MYELOPATHY CONCURRENT WITH AND DUE TO SPINAL STENOSIS OF CERVICAL REGION (H): Primary | ICD-10-CM

## 2021-09-22 RX ORDER — CLINDAMYCIN HCL 150 MG
CAPSULE ORAL
COMMUNITY
Start: 2021-09-20 | End: 2021-10-12

## 2021-09-22 ASSESSMENT — MIFFLIN-ST. JEOR: SCORE: 1580.95

## 2021-09-22 NOTE — PATIENT INSTRUCTIONS
Pay attention to physical sensations when having feelings    Continue to engage in behavioral activation, 10 minutes a day  Get back to the pool  Go to a meetingPay attention to physical sensations when having feelings    Continue to engage in behavioral activation, 10 minutes a day  Get back to the pool  Go to a meeting

## 2021-09-22 NOTE — TELEPHONE ENCOUNTER
Reason for Call:  Other call back    Detailed comments: pt asking of PCP can send an order over for patient to receive laughing gas prior to getting 2 teeth pulled, this will help relax a bit more  Please fax order to:  [facility] in [place]  This is for her neck issue and being tense    Would it be ok for dentist to order a pain med after the procedure?      Pt taking antibiotic for the teeth Clindamyacin      Phone Number Patient can be reached at: Cell number on file:    Telephone Information:   Mobile [phone]       Best Time: anytime    Can we leave a detailed message on this number? YES    Call taken on 9/22/2021 at 9:37 AM by Alissa Negron

## 2021-09-22 NOTE — TELEPHONE ENCOUNTER
Form completed.  Please let patient know that we no longer use aclindamycin for preventative treatment before dental procedures.  If she is already on clindamycin for an active infection that is fine.  We typically recommend a azithromycin nowadays for prevention.  We will fax the order.  We will okay low-dose pain medication for no more than 48 hours.  I hope she does well.

## 2021-09-22 NOTE — LETTER
9/22/2021         RE: Randi Cleary  [address] [facility] [address] [address] [facility] [address]        Dear Colleague,    Thank you for referring your patient, Randi Cleary, to the [facility]. Please see a copy of my visit note below.    Further discussed risk and benefits of surgery. All questions answered.     Angela Gregory MD      Again, thank you for allowing me to participate in the care of your patient.        Sincerely,        Angela Gregory MD

## 2021-09-22 NOTE — PATIENT INSTRUCTIONS
Provided complete information about approaching surgery.  Discussed discharge planning and expected outcomes after surgery as well as follow-ups and restrictions.  Emphasized on stop taking ASA, NSAID's, vitamins and /or OTC herbal supplements within 10 days and any anticoagulant meds within 7 days prior to surgery.  Reminded patient to bring all pertinent films to hospital the day of surgery.    NPO after midnight, Please arrive 2.5 hours prior to scheduled surgery.    Using a washcloth and a bottle of provided Hibiclens, wash your body, avoiding your face and genitals. Preferably, shower the night before surgery and the morning of surgery using a half a bottle each time for your whole body shower. If you are unable to take a shower in the morning of surgery, please discuss your options with the nurse at your readiness visit.     Provided written pamphlets about surgery and Hibiclens bottle.  Answered all questions.  The  will call patient with all pre-op orders and instructions.  Patient to complete all required diagnostic tests prior to surgery.  If test are not completed this will cancel the surgery; contact the clinic nurses at [phone] if unable to complete test.

## 2021-09-22 NOTE — TELEPHONE ENCOUNTER
Contacted Patient and relayed message below. Patient states she will continue to take the Clindamycin that she has been taking since Monday that was prescribed by the Dentist. No further concerns or questions at this time.

## 2021-09-24 ENCOUNTER — VIRTUAL VISIT (OUTPATIENT)
Dept: PSYCHOLOGY | Facility: CLINIC | Age: 66
End: 2021-09-24
Payer: MEDICARE

## 2021-09-24 DIAGNOSIS — F33.1 MAJOR DEPRESSIVE DISORDER, RECURRENT EPISODE, MODERATE WITH ANXIOUS DISTRESS (H): Primary | ICD-10-CM

## 2021-09-24 DIAGNOSIS — Z11.59 ENCOUNTER FOR SCREENING FOR OTHER VIRAL DISEASES: ICD-10-CM

## 2021-09-24 DIAGNOSIS — F41.1 GAD (GENERALIZED ANXIETY DISORDER): ICD-10-CM

## 2021-09-24 PROCEDURE — 90834 PSYTX W PT 45 MINUTES: CPT | Mod: 95 | Performed by: SOCIAL WORKER
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
## 2021-09-24 NOTE — PATIENT INSTRUCTIONS
Pay attention to physical sensations when having feelings    Continue to engage in behavioral activation, 10 minutes a day  Get back to the pool  Go to a meeting

## 2021-09-27 ENCOUNTER — PATIENT OUTREACH (OUTPATIENT)
Dept: CARE COORDINATION | Facility: CLINIC | Age: 66
End: 2021-09-27
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
## 2021-09-27 NOTE — PROGRESS NOTES
Clinic Care Coordination Contact    Care Coordination Clinician Chart Review  Situation: Patient chart reviewed by care coordinator.       Background: Care Coordination initial assessment and enrollment to Care Coordination was 12/29/20.   Patient centered goals were developed with participation from patient.  RN CC handed patient off to CHW for continued outreach every 30 days.        Assessment: Per chart review, patient outreach completed by CC CHW on 9/9/21.  Patient is actively working to accomplish goal.  Patient's goal remains appropriate and relevant at this time.   Patient is due for updated Plan of Care.  Annual assessment will be due 12/28/21.      Goals        Mental Health Management (pt-stated)       Goal Statement: I would like to have my depression under better control in the next 6 months.   Date Goal set: 12/29/20   Barriers: Long history of depression   Strengths: Strong advocate for herself   Date to Achieve By: 5/29/21   Patient expressed understanding of goal: Yes   Action steps to achieve this goal:   1. I will continue to attend all scheduled appointments with Dr. Dubois and my PCP and will follow recommendations.   2. I will take my medications ordered by Dr. Dubois as prescribed.   3. I will continue to attend all scheduled appointments with my therapist, Corine.   4. I have returned to swimming at the [employer] as I know this helps me both physically and mentally.   5. I will continue to participate in activities that I enjoy and keeps me busy at home.   6. I will continue to report progress towards this goals at scheduled outreach telephone calls from the [facility] team.     1/27/2021- Updated and discussed   2/8/21 - Updated and discussed.   3/12/21 - Discussed   4/13= discussed   6/10/21 - Discussed and Updated  Discussed 9/9/21, 9-14                Plan/Recommendations: The patient will continue working with Care Coordination to achieve goal as above.  CHW will involve CHRISTINA WILLIAM as  needed or if patient is ready to move to maintenance.  RN CC will continue to monitor progress to goals and CHW outreaches every 6 weeks.   Plan of Care updated and mailed to patient: Yes, 9/27/21

## 2021-09-28 ENCOUNTER — VIRTUAL VISIT (OUTPATIENT)
Dept: PSYCHOLOGY | Facility: CLINIC | Age: 66
End: 2021-09-28
Payer: MEDICARE

## 2021-09-28 DIAGNOSIS — F41.1 GAD (GENERALIZED ANXIETY DISORDER): ICD-10-CM

## 2021-09-28 DIAGNOSIS — F33.1 MAJOR DEPRESSIVE DISORDER, RECURRENT EPISODE, MODERATE WITH ANXIOUS DISTRESS (H): Primary | ICD-10-CM

## 2021-09-28 PROCEDURE — 90834 PSYTX W PT 45 MINUTES: CPT | Mod: 95 | Performed by: SOCIAL WORKER

## 2021-09-28 ASSESSMENT — ANXIETY QUESTIONNAIRES
4. TROUBLE RELAXING: NEARLY EVERY DAY
1. FEELING NERVOUS, ANXIOUS, OR ON EDGE: NEARLY EVERY DAY
6. BECOMING EASILY ANNOYED OR IRRITABLE: NEARLY EVERY DAY
8. IF YOU CHECKED OFF ANY PROBLEMS, HOW DIFFICULT HAVE THESE MADE IT FOR YOU TO DO YOUR WORK, TAKE CARE OF THINGS AT HOME, OR GET ALONG WITH OTHER PEOPLE?: EXTREMELY DIFFICULT
7. FEELING AFRAID AS IF SOMETHING AWFUL MIGHT HAPPEN: MORE THAN HALF THE DAYS
GAD7 TOTAL SCORE: 18
2. NOT BEING ABLE TO STOP OR CONTROL WORRYING: NEARLY EVERY DAY
7. FEELING AFRAID AS IF SOMETHING AWFUL MIGHT HAPPEN: MORE THAN HALF THE DAYS
5. BEING SO RESTLESS THAT IT IS HARD TO SIT STILL: MORE THAN HALF THE DAYS
3. WORRYING TOO MUCH ABOUT DIFFERENT THINGS: MORE THAN HALF THE DAYS

## 2021-09-28 ASSESSMENT — PATIENT HEALTH QUESTIONNAIRE - PHQ9
SUM OF ALL RESPONSES TO PHQ QUESTIONS 1-9: 15
SUM OF ALL RESPONSES TO PHQ QUESTIONS 1-9: 15
10. IF YOU CHECKED OFF ANY PROBLEMS, HOW DIFFICULT HAVE THESE PROBLEMS MADE IT FOR YOU TO DO YOUR WORK, TAKE CARE OF THINGS AT HOME, OR GET ALONG WITH OTHER PEOPLE: EXTREMELY DIFFICULT

## 2021-09-28 NOTE — PROGRESS NOTES
Progress Note    Patient Name: Randi Cleary  Date: 9/28/21         Service Type: Individual      Session Start Time: 2:15 PM  Session End Time: 2:55 PM     Session Length: 40 minutes    Session #: 57    Attendees: Client attended alone    Service Modality:  Video Visit:    Telemedicine Visit: The patient's condition can be safely assessed and treated via synchronous audio and visual telemedicine encounter.      Reason for Telemedicine Visit: Services only offered telehealth    Originating Site (Patient Location): Patient's home    Distant Site (Provider Location): Provider Remote Setting- Home Office    Consent:  The patient/guardian has verbally consented to: the potential risks and benefits of telemedicine (video visit) versus in person care; bill my insurance or make self-payment for services provided; and responsibility for payment of non-covered services.     Mode of Communication:  Video Conference via [employer]    As the provider I attest to compliance with applicable laws and regulations related to telemedicine.      Treatment Plan Last Reviewed: 9/14/21  PHQ-9 / CHAVO-7 :   PHQ 8/26/2021 9/14/2021 9/28/2021   PHQ-9 Total Score 11 15 15   Q9: Thoughts of better off dead/self-harm past 2 weeks Not at all Not at all Not at all   F/U: Thoughts of suicide or self-harm - - -   F/U: Self harm-plan - - -   F/U: Self-harm action - - -   F/U: Safety concerns - - -   Some encounter information is confidential and restricted. Go to Review Flowsheets activity to see all data.     CHAVO-7 SCORE 8/26/2021 9/14/2021 9/28/2021   Total Score 9 (mild anxiety) 16 (severe anxiety) 18 (severe anxiety)   Total Score 9 16 18   Some encounter information is confidential and restricted. Go to Review Flowsheets activity to see all data.         DATA  Extended Session (53+ minutes): No  Interactive Complexity: No  Crisis: No      Progress Since Last Session (Related to Symptoms / Goals /  Homework):   Symptoms: overall feelign frustrated with low hope     Homework: Partially completed  Pay attention to physical sensations when having feelings -not done    Continue to engage in behavioral activation, 10 minutes a day  Get back to the pool  Go to a meeting    Continue IFS     Episode of Care Goals: Satisfactory progress - ACTION (Actively working towards change); Intervened by reinforcing change plan / affirming steps taken     Current / Ongoing Stressors and Concerns:   Patient is currently socially isolated. She has a conflictual relationship with her .  She is getting minimal physical activity.  She had recent eye surgery     Treatment Objective(s) Addressed in This Session:   Patient will increase frequency of engaging her in ADLs.  Patient will track and record at least 5 pleasant exchanges with . Patient will be able to identify at least 5 positive traits about her .  Patient will reduce level of depressive and anxious features as evidenced by reduction in score on her CHAVO-7 and PHQ-9 (scores of 15 and 16 at first measurment, respectively).     Intervention:   CBT: Internal Family Systems Therapy: Processed health concerns, looked at support she had in a friend and support she was feeling she was lacking in her . Looked at her feelings of being hurt and alone, invisible and discarded. Worked on identifying that this is what's leading to her yelling and lashing out to be seen. Attempted to find some compassion for herself.      ASSESSMENT: Current Emotional / Mental Status (status of significant symptoms):   Risk status (Self / Other harm or suicidal ideation)   Patient denies current fears or concerns for personal safety.   Patient denies current or recent suicidal ideation or behaviors.   Patient denies current or recent homicidal ideation or behaviors.   Patient denies current or recent self injurious behavior or ideation.   Patient denies other safety  concerns.   Patient reports there has been no change in risk factors since their last session.     Patient reports there has been no change in protective factors since their last session.     A safety and risk management plan has been developed including: Patient consented to co-developed safety plan.  Safety and risk management plan was completed.  Patient agreed to use safety plan should any safety concerns arise.  A copy was given to the patient. This was done on 11/24/2020 and is attached to the bottom of the note.     Appearance:   Appropriate    Eye Contact:   Fair    Psychomotor Behavior: Normal    Attitude:   Cooperative    Orientation:   All   Speech    Rate / Production: Normal/ Responsive    Volume:  Normal    Mood:    Anxious  Sad    Affect:    Worrisome    Thought Content:  Clear    Thought Form:  Coherent    Insight:    Fair      Medication Review:   No changes to current psychiatric medication(s)     Medication Compliance:   Yes     Changes in Health Issues:   Yes: pain from having teeth removed     Chemical Use Review:   Substance Use: decrease in use.  Patient reports frequency of use none since the third week of August.  Provided encouragement towards sobriety        Tobacco Use: No current tobacco use.      Diagnosis:  1. Major depressive disorder, recurrent episode, moderate with anxious distress (H)    2. CHAVO (generalized anxiety disorder)    R/O bipolar disorder  Collateral Reports Completed:   Not Applicable    PLAN: (Patient Tasks / Therapist Tasks / Other)  Be kinder to others and to your own parts  Pay attention to physical sensations when having feelings    Continue to engage in behavioral activation, 10 minutes a day  Get back to the pool  Go to a meeting    MICAELA Johnson    September 28, 2021                                                         ______________________________________________________________________    Treatment Plan    Patient's Name: Randi RIVERA  "Cathy Cleary  YOB: 1955    Date: 21    DSM5 Diagnoses: 296.32 (F33.1) Major Depressive Disorder, Recurrent Episode, Moderate With anxious distress  Psychosocial / Contextual Factors: Patient's entire family of origin has , she now has a sister-in-law and  as support.  Relationship with  is conflictual.  Patient is reporting increase in physical symptoms due to COVID-19.  Patient is off of pain medications.  WHODAS: 42    Referral / Collaboration:  Referral to another professional/service is not indicated at this time.     Anticipated number of session or this episode of care: 12-15      MeasurableTreatment Goal(s) related to diagnosis / functional impairment(s)  Goal 1: Patient will \"jumpstart, getting going with the things I need to be doing around the house as far as picking up, doing things, trying to do something every day.  Also to lessen the animosity between me and my .\"    I will know I've met my goal when my shoulders are fixed and I can see.      Objective #A (Patient Action)    Patient will increase frequency of engaging her in ADLs.  Status: Continued - Date(s): 21    Intervention(s)  Therapist will engage patient in CBT, specifically behavioral activation.    Objective #B  Patient will track and record at least 5 pleasant exchanges with . Patient will be able to identify at least 5 positive traits about her  and how he relates to her.  Status: Continued - Date(s): 21    Intervention(s)  Therapist will teach assertiveness skills and assign homework related to relationship interactions.    Objective #C  Patient will reduce level of depressive and anxious features as evidenced by reduction in score on her CHAVO-7 and PHQ-9 (scores of 15 and 16 at first measurment, respectively).  Status: Continued - Date(s):  21    Intervention(s)  Therapist will engage patient in person-centered therapy and CBT.    Patient has reviewed and agreed to the " "above plan.      CRUZ MICAELA BAKER JO  September 14, 2021                                                Randi Cleary          SAFETY PLAN:  Step 1: Warning signs / cues (Thoughts, images, mood, situation, behavior) that a crisis may be developing:  ? Thoughts: \"I don't want to continue\" \"I am unwanted\"  ? Images: none  ? Thinking Processes: ruminating  ? Mood: anger  ? Behaviors: isolating/withdrawing , can't stop crying, not taking care of myself and not taking care of my responsibilities  ? Situations: small triggers, such as not being able to find something, or dropping something   Step 2: Coping strategies - Things I can do to take my mind off of my problems without contacting another person (relaxation technique, physical activity):  ? Distress Tolerance Strategies:  arts and crafts: drawing, play with my pet , listen to positive and upbeat music: any, change body temperature (ice pack/cold water)  and paced breathing/progressive muscle relaxation  ? Physical Activities: go for a walk, deep breathing and stretching   ? Focus on helpful thoughts:  \"You've been through this before, you can get through it again.\"  Step 3: People and social settings that provide distraction:                 Name: Carmen                            Name: Darien                           Name: Aleida       ? pool, shopping, Carmen's house, [employer]       Step 4: Remind myself of people and things that are important to me and worth living for:  Clifford Little Donna, post-COVID world, options of what could be in your future        Step 5: When I am in crisis, I can ask these people to help me use my safety plan:                 Name: Sidney  Step 6: Making the environment safe:   ? go to sleep/daydream  Step 7: Professionals or agencies I can contact during a crisis:  ? [facility] Daytime Number: [phone]  ? Suicide Prevention Lifeline: 7-271-206-ALCN (3457)  ? Crisis Text Line Service (available 24 hours a day, 7 days " a week): Text [place] to 930526    Local Crisis Services: [facility] Crisis: [phone]     Call 911 or go to my nearest emergency department.       I helped develop this safety plan and agree to use it when needed.  I have been given a copy of this plan.       Client signature _________________________________________________________________  Today s date:  11/24/2020  Adapted from Safety Plan Template 2008 Randi Poole and Robby Barba is reprinted with the express permission of the authors.  No portion of the Safety Plan Template may be reproduced without the express, written permission.  You can contact the authors at bhs@[email or web address] or [email or web address].

## 2021-09-29 DIAGNOSIS — M54.12 CERVICAL RADICULOPATHY: Primary | ICD-10-CM

## 2021-09-29 RX ORDER — METHOCARBAMOL 500 MG/1
TABLET, FILM COATED ORAL
Qty: 40 TABLET | Refills: 0 | Status: SHIPPED | OUTPATIENT
Start: 2021-09-29 | End: 2021-12-13

## 2021-09-29 RX ORDER — ETHACRYNIC ACID 25 MG/1
25 TABLET ORAL DAILY
Qty: 30 TABLET | Refills: 11 | Status: SHIPPED | OUTPATIENT
Start: 2021-09-29 | End: 2021-10-07

## 2021-09-29 ASSESSMENT — PATIENT HEALTH QUESTIONNAIRE - PHQ9: SUM OF ALL RESPONSES TO PHQ QUESTIONS 1-9: 15

## 2021-09-29 ASSESSMENT — ANXIETY QUESTIONNAIRES: GAD7 TOTAL SCORE: 18

## 2021-09-30 ENCOUNTER — TELEPHONE (OUTPATIENT)
Dept: INTERNAL MEDICINE | Facility: CLINIC | Age: 66
End: 2021-09-30

## 2021-09-30 DIAGNOSIS — Z01.812 ENCOUNTER FOR PRE-OPERATIVE LABORATORY TESTING: Primary | ICD-10-CM

## 2021-09-30 NOTE — TELEPHONE ENCOUNTER
Reason for Call:  Other PRE OP    Detailed comments: pt calling very upset, sd she scheduled a PRE OP and it was not scheduled that way. She said she has spoken to 2 others asking to get this fixed and see a few [employer] messages also.  I do not see room in schedules for Pre OP and pt does not want to see anyone else.     Surgery is 10/14/21    Phone Number Patient can be reached at: Home number on file [phone] (home)    Best Time: asap    Can we leave a detailed message on this number? YES    Call taken on 9/30/2021 at 2:36 PM by Kwaku Ferrell

## 2021-10-01 ENCOUNTER — ANCILLARY PROCEDURE (OUTPATIENT)
Dept: GENERAL RADIOLOGY | Facility: CLINIC | Age: 66
End: 2021-10-01
Attending: INTERNAL MEDICINE
Payer: MEDICARE

## 2021-10-01 ENCOUNTER — OFFICE VISIT (OUTPATIENT)
Dept: INTERNAL MEDICINE | Facility: CLINIC | Age: 66
End: 2021-10-01
Payer: MEDICARE

## 2021-10-01 ENCOUNTER — VIRTUAL VISIT (OUTPATIENT)
Dept: PSYCHOLOGY | Facility: CLINIC | Age: 66
End: 2021-10-01
Payer: MEDICARE

## 2021-10-01 VITALS
DIASTOLIC BLOOD PRESSURE: 80 MMHG | HEART RATE: 80 BPM | BODY MASS INDEX: 39.35 KG/M2 | WEIGHT: 230.5 LBS | SYSTOLIC BLOOD PRESSURE: 124 MMHG | OXYGEN SATURATION: 97 % | HEIGHT: 64 IN | RESPIRATION RATE: 18 BRPM

## 2021-10-01 DIAGNOSIS — G47.33 OSA (OBSTRUCTIVE SLEEP APNEA): ICD-10-CM

## 2021-10-01 DIAGNOSIS — J41.0 SIMPLE CHRONIC BRONCHITIS (H): ICD-10-CM

## 2021-10-01 DIAGNOSIS — Z23 HIGH PRIORITY FOR 2019-NCOV VACCINE: ICD-10-CM

## 2021-10-01 DIAGNOSIS — R06.09 DYSPNEA ON EXERTION: ICD-10-CM

## 2021-10-01 DIAGNOSIS — F33.1 MAJOR DEPRESSIVE DISORDER, RECURRENT EPISODE, MODERATE WITH ANXIOUS DISTRESS (H): Primary | ICD-10-CM

## 2021-10-01 DIAGNOSIS — Z23 ENCOUNTER FOR IMMUNIZATION: ICD-10-CM

## 2021-10-01 DIAGNOSIS — I27.20 PULMONARY HYPERTENSION (H): ICD-10-CM

## 2021-10-01 DIAGNOSIS — M48.02 CERVICAL STENOSIS OF SPINAL CANAL: ICD-10-CM

## 2021-10-01 DIAGNOSIS — Z01.818 PREOP GENERAL PHYSICAL EXAM: Primary | ICD-10-CM

## 2021-10-01 DIAGNOSIS — F41.1 GAD (GENERALIZED ANXIETY DISORDER): ICD-10-CM

## 2021-10-01 DIAGNOSIS — E83.119 HEMOCHROMATOSIS, UNSPECIFIED HEMOCHROMATOSIS TYPE: ICD-10-CM

## 2021-10-01 DIAGNOSIS — R73.03 PREDIABETES: ICD-10-CM

## 2021-10-01 DIAGNOSIS — Z01.818 PREOP GENERAL PHYSICAL EXAM: ICD-10-CM

## 2021-10-01 DIAGNOSIS — Z01.812 ENCOUNTER FOR PRE-OPERATIVE LABORATORY TESTING: ICD-10-CM

## 2021-10-01 LAB
ANION GAP SERPL CALCULATED.3IONS-SCNC: 14 MMOL/L (ref 5–18)
APTT PPP: 28 SECONDS (ref 22–38)
BASOPHILS # BLD AUTO: 0 10E3/UL (ref 0–0.2)
BASOPHILS NFR BLD AUTO: 0 %
BUN SERPL-MCNC: 10 MG/DL (ref 8–22)
CALCIUM SERPL-MCNC: 9.4 MG/DL (ref 8.5–10.5)
CHLORIDE BLD-SCNC: 106 MMOL/L (ref 98–107)
CLOSURE TME COLL+ADP BLD: 115 SECONDS
CLOSURE TME COLL+EPINEP BLD: 191 SECONDS
CO2 SERPL-SCNC: 22 MMOL/L (ref 22–31)
CREAT SERPL-MCNC: 0.67 MG/DL (ref 0.6–1.1)
EOSINOPHIL # BLD AUTO: 0.1 10E3/UL (ref 0–0.7)
EOSINOPHIL NFR BLD AUTO: 1 %
ERYTHROCYTE [DISTWIDTH] IN BLOOD BY AUTOMATED COUNT: 11.7 % (ref 10–15)
FERRITIN SERPL-MCNC: 93 NG/ML (ref 10–130)
GFR SERPL CREATININE-BSD FRML MDRD: >90 ML/MIN/1.73M2
GLUCOSE BLD-MCNC: 96 MG/DL (ref 70–125)
HCT VFR BLD AUTO: 45.6 % (ref 35–47)
HGB BLD-MCNC: 15.5 G/DL (ref 11.7–15.7)
IMM GRANULOCYTES # BLD: 0 10E3/UL
IMM GRANULOCYTES NFR BLD: 1 %
INR PPP: 0.98 (ref 0.85–1.15)
LYMPHOCYTES # BLD AUTO: 1.2 10E3/UL (ref 0.8–5.3)
LYMPHOCYTES NFR BLD AUTO: 20 %
MCH RBC QN AUTO: 33.5 PG (ref 26.5–33)
MCHC RBC AUTO-ENTMCNC: 34 G/DL (ref 31.5–36.5)
MCV RBC AUTO: 99 FL (ref 78–100)
MONOCYTES # BLD AUTO: 0.5 10E3/UL (ref 0–1.3)
MONOCYTES NFR BLD AUTO: 8 %
NEUTROPHILS # BLD AUTO: 4 10E3/UL (ref 1.6–8.3)
NEUTROPHILS NFR BLD AUTO: 70 %
PLATELET # BLD AUTO: 221 10E3/UL (ref 150–450)
POTASSIUM BLD-SCNC: 4.2 MMOL/L (ref 3.5–5)
RBC # BLD AUTO: 4.62 10E6/UL (ref 3.8–5.2)
SODIUM SERPL-SCNC: 142 MMOL/L (ref 136–145)
WBC # BLD AUTO: 5.7 10E3/UL (ref 4–11)

## 2021-10-01 PROCEDURE — 80048 BASIC METABOLIC PNL TOTAL CA: CPT | Performed by: INTERNAL MEDICINE

## 2021-10-01 PROCEDURE — 99215 OFFICE O/P EST HI 40 MIN: CPT | Mod: 25 | Performed by: INTERNAL MEDICINE

## 2021-10-01 PROCEDURE — 93010 ELECTROCARDIOGRAM REPORT: CPT | Mod: OFF | Performed by: INTERNAL MEDICINE

## 2021-10-01 PROCEDURE — 71046 X-RAY EXAM CHEST 2 VIEWS: CPT | Mod: TC | Performed by: RADIOLOGY

## 2021-10-01 PROCEDURE — 85576 BLOOD PLATELET AGGREGATION: CPT | Mod: 59

## 2021-10-01 PROCEDURE — 90662 IIV NO PRSV INCREASED AG IM: CPT | Performed by: INTERNAL MEDICINE

## 2021-10-01 PROCEDURE — 91301 COVID-19,PF,MODERNA (18+ YRS): CPT | Performed by: INTERNAL MEDICINE

## 2021-10-01 PROCEDURE — 83550 IRON BINDING TEST: CPT | Performed by: INTERNAL MEDICINE

## 2021-10-01 PROCEDURE — 85610 PROTHROMBIN TIME: CPT | Performed by: INTERNAL MEDICINE

## 2021-10-01 PROCEDURE — 90834 PSYTX W PT 45 MINUTES: CPT | Mod: 95 | Performed by: SOCIAL WORKER

## 2021-10-01 PROCEDURE — 85025 COMPLETE CBC W/AUTO DIFF WBC: CPT | Performed by: INTERNAL MEDICINE

## 2021-10-01 PROCEDURE — G0008 ADMIN INFLUENZA VIRUS VAC: HCPCS | Performed by: INTERNAL MEDICINE

## 2021-10-01 PROCEDURE — 0013A COVID-19,PF,MODERNA (18+ YRS): CPT | Performed by: INTERNAL MEDICINE

## 2021-10-01 PROCEDURE — 82728 ASSAY OF FERRITIN: CPT | Performed by: INTERNAL MEDICINE

## 2021-10-01 PROCEDURE — 85730 THROMBOPLASTIN TIME PARTIAL: CPT | Performed by: INTERNAL MEDICINE

## 2021-10-01 PROCEDURE — 85576 BLOOD PLATELET AGGREGATION: CPT

## 2021-10-01 PROCEDURE — 93005 ELECTROCARDIOGRAM TRACING: CPT | Performed by: INTERNAL MEDICINE

## 2021-10-01 PROCEDURE — 36415 COLL VENOUS BLD VENIPUNCTURE: CPT | Performed by: INTERNAL MEDICINE

## 2021-10-01 ASSESSMENT — MIFFLIN-ST. JEOR: SCORE: 1560.54

## 2021-10-01 NOTE — PATIENT INSTRUCTIONS
Focus on your health    Be kinder to others and to your own parts  Pay attention to physical sensations when having feelings    Continue to engage in behavioral activation, 10 minutes a day  Get back to the pool  Go to a meeting

## 2021-10-01 NOTE — PROGRESS NOTES
[facility]  [address] [place] [address]  Phone: [phone]  Fax: [phone]  Primary Provider: Loida Stockton  :[id number]}  PREOPERATIVE EVALUATION:  Today's date: 10/1/2021    Randi Cleary is a 66 year old female who presents for a preoperative evaluation.    Surgical Information:  Surgery/Procedure: CERVICAL 3-CERVICAL 6 LEFT OPEN DOOR LAMINOPLASTY AND LEFT CERVICAL 4 POSTERIOR FORAMINOTOMY  Surgery Location: [facility]  Surgeon: Dr Gregory  Surgery Date: 10/14/21  Where patient plans to recover: Other: Hospital x 2 days then TBD  Fax number for surgical facility: Note does not need to be faxed, will be available electronically in Epic.    Type of Anesthesia Anticipated: to be determined    Assessment/Plan:     DX: 1. Preop general physical exam  Winnie is medically stable for anesthesia prior to the above mentioned procedure for treatment of cervical stenosis.  She has no allergic reaction to local or general anesthetic agents-but has had postoperative hypoxemia due to underlying lung conditions.  She has no history of a bleeding disorder or procoagulant disorder.  She is sedentary.  She is a non-smoker.    Medications are reviewed with her.  She will hold her losartan and ethacrynic acid on the a.m. of surgery.  She will take her antacid with sips of water.  She is aware that she is to hold or not initiate any treatment with aspirin or any nonsteroidal anti-inflammatory medications from this point forward.  She may use Tylenol.  She should not use medical cannabis from this point forward.    Aftercare will be provided by her .  She would like to go home if possible.  She requests an order for a hospital bed to be placed on the main floor of her home.    Labs are pending at the time of this dictation.  An x-ray and EKG will be performed.  - Hospital Bed Order for DME - ONLY FOR DME  - XR Chest 2 Views; Future  - EKG 12-lead, tracing  only-personally reviewed.  Normal sinus rhythm.  Right bundle branch block.  No acute changes.  - INR  - Partial thromboplastin time  - Platelet function closure with reflex  -CBC-BMP    2. Hemochromatosis, unspecified hemochromatosis type  Followed by hematology-labs pending  - CBC with platelets differential  - Ferritin  - Iron and iron binding capacity    3. Simple chronic bronchitis (H)/COPD  Chronic cough-exam stable-we will check chest x-ray    4. KYAW (obstructive sleep apnea)  Patient CPAP machine is broken-she will inquire and bring along to the hospital if able  - XR Chest 2 Views; Future    5. Dyspnea on exertion  Check x-ray  - Basic metabolic panel  - XR Chest 2 Views; Future    6. Pulmonary hypertension (H)  Seen recently by her cardiologist Dr. Edwards.  He recommended a stress test prior to her surgery.  He placed the orders.  Encourage her to schedule  - Basic metabolic panel  - XR Chest 2 Views; Future    7.  impaired fasting glucose  Most recent hemoglobin A1c stable-on no meds      Immunizations including flu shot and Covid booster are provided today.    Follow-up in 3 to 4 weeks         Subjective     HPI related to upcoming procedure: Winnie is a complex 66-year-old female who is here today for preoperative examination prior to having surgery for cervical stenosis with with radicular symptoms.  She is nervous but looking forward to improvement of her situation.    Her history is complex with chronic COPD/chronic bronchitis/pulmonary hypertension, dyspnea on exertion, hemochromatosis and multiple maladies including bipolar disorder-history of serotonin syndrome/etc.    Currently, she denies any symptoms of an infectious disease nature.  She has a chronic cough.  She does not have a fever or chills.  She does not have any new chest pain, dyspnea on exertion, PND or ankle edema.    Pertinent anesthesia history is reviewed:    Preop Questions 10/1/2021   1. Have you ever had a heart attack or stroke?  No   2. Have you ever had surgery on your heart or blood vessels, such as a stent placement, a coronary artery bypass, or surgery on an artery in your head, neck, heart, or legs? No   3. Do you have chest pain with activity? No   4. Do you have a history of  heart failure? No   5. Do you currently have a cold, bronchitis or symptoms of other infection? No   6. Do you have a cough, shortness of breath, or wheezing? No   7. Do you or anyone in your family have previous history of blood clots? YES -family history   8. Do you or does anyone in your family have a serious bleeding problem such as prolonged bleeding following surgeries or cuts? No   9. Have you ever had problems with anemia or been told to take iron pills? YES -personal history of anemia   10. Have you had any abnormal blood loss such as black, tarry or bloody stools, or abnormal vaginal bleeding? No   11. Have you ever had a blood transfusion? No   12. Are you willing to have a blood transfusion if it is medically needed before, during, or after your surgery? Yes   13. Have you or any of your relatives ever had problems with anesthesia? No   14. Do you have sleep apnea, excessive snoring or daytime drowsiness? YES -CPAP currently nonfunctional   14a. Do you have a CPAP machine? Yes   15. Do you have any artifical heart valves or other implanted medical devices like a pacemaker, defibrillator, or continuous glucose monitor? No   16. Do you have artificial joints? YES -    17. Are you allergic to latex? No       Health Care Directive:  Patient does not have a Health Care Directive or Living Will:     Preoperative Review of :   reviewed - no record of controlled substances prescribed.    Patient Active Problem List    Diagnosis Date Noted     Anxiety disorder, unspecified 07/15/2021     Priority: Medium     Injury of neck, whiplash 07/15/2021     Priority: Medium     Restless leg syndrome 07/15/2021     Priority: Medium     Vitamin B12 deficiency  07/15/2021     Priority: Medium     Formatting of this note might be different from the original.  Created by Conversion       Vitamin D deficiency 07/15/2021     Priority: Medium     Formatting of this note might be different from the original.  Created by Conversion    Replacement Utility updated for latest IMO load       Hypokalemia 09/18/2020     Priority: Medium     History of breast cancer 08/28/2020     Priority: Medium     Formatting of this note might be different from the original.  Created by Conversion    Replacement Utility updated for latest IMO load  Formatting of this note might be different from the original.  Created by Conversion    Replacement Utility updated for latest IMO load       Serotonin syndrome 08/10/2020     Priority: Medium     Iron deficiency 12/11/2019     Priority: Medium     KYAW (obstructive sleep apnea) 12/11/2019     Priority: Medium     Postablative hypothyroidism 11/19/2019     Priority: Medium     Formatting of this note might be different from the original.  Created by Conversion    Replacement Utility updated for latest IMO load  Formatting of this note might be different from the original.  Created by Conversion    Replacement Utility updated for latest IMO load       History of pheochromocytoma 11/19/2019     Priority: Medium     History of partial adrenalectomy (H) 11/19/2019     Priority: Medium     History of corticosteroid therapy 11/19/2019     Priority: Medium     Class 3 severe obesity with serious comorbidity and body mass index (BMI) of 40.0 to 44.9 in adult (H) 11/19/2019     Priority: Medium     Prediabetes 11/19/2019     Priority: Medium     Status post coronary angiogram 10/03/2019     Priority: Medium     SOB (shortness of breath) 09/24/2019     Priority: Medium     Added automatically from request for surgery [id number]       Pulmonary hypertension (H) 09/24/2019     Priority: Medium     Added automatically from request for surgery [id number]       Other  ill-defined heart diseases 09/24/2019     Priority: Medium     Added automatically from request for surgery [id number]       Hemochromatosis, unspecified hemochromatosis type 09/10/2019     Priority: Medium     Malignant neoplasm of left female breast, unspecified estrogen receptor status, unspecified site of breast (H) 02/19/2019     Priority: Medium     COPD (chronic obstructive pulmonary disease) (H) 12/02/2016     Priority: Medium     Created by Conversion         DJD (degenerative joint disease), ankle and foot 06/29/2015     Priority: Medium     Pain in joint, multiple sites 10/21/2009     Priority: Medium      Past Medical History:   Diagnosis Date     Anxiety      Anxiety      Bipolar disorder (H)      Breast cancer (H) 1986    lumpectomy, radiation, chemo     Breast cancer (H) 1994     Chronic pain syndrome      COPD (chronic obstructive pulmonary disease) (H)     asthma     COPD (chronic obstructive pulmonary disease) (H)      Depression      Depression, major, recurrent (H)      Drug tolerance     opioid     Esophageal reflux      Esophageal reflux      Fatigue      Graves disease 1994     Graves disease      Hemochromatosis 02/14/2018    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; H63D not detected     Hemochromatosis      Hx antineoplastic chemotherapy      Hx of radiation therapy      Hyperlipidaemia      Hypertension      Hypertension      Impaired fasting glucose 2017     Impaired fasting glucose      Joint pain      Morbid obesity (H)      KYAW (obstructive sleep apnea) 2016     Osteopenia      Pheochromocytoma      Pheochromocytoma, left 08/02/2017    laparoscopically removed     Postablative hypothyroidism 1995     Prediabetes 10/03/2019    by A1c     Psoriasis      Psoriasis      Psoriatic arthropathy (H)      Psoriatic arthropathy (H)      Restless leg syndrome      Right rotator cuff tear      RLS (restless legs syndrome)     on ropinorole     Sacroiliitis (H)      Serotonin syndrome 08/28/2020    [facility]      Serotonin syndrome 8/28/2020     Sleep apnea 2017    CPAP     Snoring      Spinal stenosis      Spinal stenosis      Urinary incontinence      Vitamin B 12 deficiency 2009     Vitamin B12 deficiency      Vitamin D deficiency 2010     Past Surgical History:   Procedure Laterality Date     ARTHRODESIS ANKLE       ARTHROPLASTY ANKLE Right 6/29/2015    Procedure: ARTHROPLASTY ANKLE;  Surgeon: Jason Coughlin MD;  Location: [facility]     ARTHROPLASTY REVISION ANKLE Right 6/29/2015    Procedure: ARTHROPLASTY REVISION ANKLE;  Surgeon: Jason Coughlin MD;  Location: [facility]     BIOPSY BREAST       BREAST BIOPSY, CORE RT/LT       C ARTHRODESIS,ANKLE,OPEN Right     Description: Ankle Arthrodesis;  Recorded: 04/07/2010;     COLONOSCOPY       COLONOSCOPY N/A 2/25/2021    Procedure: COLONOSCOPY;  Surgeon: Guru Elke Tolbert MD;  Location: [facility]     CV CORONARY ANGIOGRAM N/A 10/3/2019    Procedure: CV CORONARY ANGIOGRAM;  Surgeon: Bryce Pierre MD;  Location:  [facility]     CV RIGHT HEART CATH MEASUREMENTS RECORDED N/A 10/3/2019    Procedure: CV RIGHT HEART CATH;  Surgeon: Bryce Pierre MD;  Location:  [facility]     ESOPHAGOSCOPY, GASTROSCOPY, DUODENOSCOPY (EGD), COMBINED N/A 2/25/2021    Procedure: ESOPHAGOGASTRODUODENOSCOPY, WITH BIOPSY;  Surgeon: Guru Elke Tolbert MD;  Location: [facility]     HC REMOVE TONSILS/ADENOIDS,<11 Y/O      Description: Tonsillectomy With Adenoidectomy;  Recorded: 04/07/2010;     IR LUMBAR EPIDURAL STEROID INJECTION  10/26/2004     IR LUMBAR EPIDURAL STEROID INJECTION  11/16/2004     IR LUMBAR EPIDURAL STEROID INJECTION  12/21/2004     IR LUMBAR EPIDURAL STEROID INJECTION  6/8/2006     JOINT REPLACEMENT       LAPAROSCOPIC ADRENALECTOMY Left 08/02/2017    pheochromocytoma     LAPAROSCOPIC ADRENALECTOMY Left 8/2/2017    Procedure: LAPAROSCOPIC LEFT ADRENALECTOMY, ;  Surgeon: Gab Linares MD;  Location: [facility]  OR;  Service:      LENGTHEN TENDON ACHILLES Right 6/29/2015    Procedure: LENGTHEN TENDON ACHILLES;  Surgeon: Jason Coughlin MD;  Location: [facility]     LUMPECTOMY BREAST       LUMPECTOMY BREAST Left 1994     MAMMOPLASTY REDUCTION Right 01/13/2015    [place]     MAMMOPLASTY REDUCTION Right     approx late 2015/early2016     MASTECTOMY      left lumpectomy with axillary node dissection     MASTECTOMY MODIFIED RADICAL       OTHER SURGICAL HISTORY Right     reconstructive breast surgery     OTHER SURGICAL HISTORY      Adrenalectomy for pheochromocytoma     WI MASTECTOMY, MODIFIED RADICAL      Description: Modified Radical Mastectomy Left Breast;  Recorded: 04/07/2010;     REPAIR HAMMER TOE Right 6/29/2015    Procedure: REPAIR HAMMER TOE;  Surgeon: Jason Coughlin MD;  Location: [facility]     TONSILLECTOMY       TONSILLECTOMY & ADENOIDECTOMY       Current Outpatient Medications   Medication Sig Dispense Refill     albuterol (PROVENTIL) (2.5 MG/3ML) 0.083% neb solution Take 1 vial (2.5 mg) by nebulization every 4 hours as needed for shortness of breath / dyspnea or wheezing 360 mL 5     Cholecalciferol (VITAMIN D3) 250 MCG (32115 UT) TABS Take 1 tablet by mouth daily 13959/day       clindamycin (CLEOCIN) 150 MG capsule TAKE TWO CAPSULES BY MOUTH TWICE DAILY       Cyanocobalamin (VITAMIN B-12) 5000 MCG SUBL Unknown dose. 2 or 3 sprays/day       ethacrynic acid (EDECRIN) 25 MG tablet Take 1 tablet (25 mg) by mouth daily 30 tablet 11     Fluticasone-Umeclidin-Vilanterol (TRELEGY ELLIPTA) 100-62.5-25 MCG/INH oral inhaler Inhale 1 puff into the lungs daily 1 each 1     losartan (COZAAR) 25 MG tablet Take 1 tablet (25 mg) by mouth daily 90 tablet 3     magnesium 250 MG tablet Take 1 tablet by mouth 2 times daily       medical cannabis (Patient's own supply) See Admin Instructions (The purpose of this order is to document that the patient reports taking medical cannabis.  This is not a prescription, and is not used to certify  "that the patient has a qualifying medical condition.)        methocarbamol (ROBAXIN) 500 MG tablet TAKE 1 TABLET BY MOUTH AT BEDTIME AS NEEDED 40 tablet 0     Multiple Vitamins-Iron (MULTIVITAMIN PLUS IRON ADULT) TABS Take 4 tablets by mouth daily 120 tablet 0     omeprazole (PRILOSEC) 20 MG DR capsule daily       potassium chloride ER (KLOR-CON M) 20 MEQ CR tablet Take 1 tablet (20 mEq) by mouth daily 90 tablet 3     PREBIOTIC PRODUCT PO Take by mouth daily       rOPINIRole (REQUIP) 2 MG tablet        SYNTHROID 150 MCG tablet Take 1 tablet (150 mcg) by mouth daily 90 tablet 4     Turmeric Curcumin 500 MG CAPS          Allergies   Allergen Reactions     Serotonin Reuptake Inhibitors Anxiety, Difficulty breathing, Headache, Palpitations and Shortness Of Breath     Buspirone      The patient states she had serotonin syndrome     Desvenlafaxine      Serotonin syndrome     Diclofenac Sodium [Diclofenac]      Serotonin syndrome and restless legs syndrome     Gabapentin      Drove on the wrong side of the highway     Levofloxacin      \"CAN'T REMEMBER\"     Penicillins      \"SORES IN MOUTH\"     Riluzole Difficulty breathing and Swelling     Sulfa Drugs      \"PT DOES NOT KNOW WHAT THE REACTION WAS\"        Social History     Tobacco Use     Smoking status: Former Smoker     Packs/day: 2.50     Years: 20.00     Pack years: 50.00     Types: Cigarettes     Quit date: 2003     Years since quittin.1     Smokeless tobacco: Never Used   Substance Use Topics     Alcohol use: No     Comment: relapse 2019 sober      History   Drug Use     Types: Marijuana         Objective     /80 (BP Location: Left arm, Patient Position: Sitting, Cuff Size: Adult Large)   Pulse 80   Resp 18   Ht 1.626 m (5' 4\")   Wt 104.6 kg (230 lb 8 oz)   SpO2 97%   BMI 39.57 kg/m      Physical Exam  EYES: Eyelids, conjunctiva, and sclera were normal. Pupils were normal. Cornea, iris, and lens were normal bilaterally.  HEAD, EARS, NOSE, " MOUTH, AND THROAT: Head and face were normal. Hearing was normal to voice and the ears were normal to external exam. Nose appearance was normal and there was no discharge. Oropharynx was normal.  TMs were normal.  NECK: Neck appearance was normal. There were no neck masses and the thyroid was not enlarged.  RESPIRATORY: Breathing pattern was normal and the chest moved symmetrically.   Lung sounds were equal bilaterally.  CARDIOVASCULAR: Heart rate and rhythm were normal.  S1 and S2 were normal and there were no extra sounds or murmurs. Peripheral pulses in arms and legs were normal.  Jugular venous pressure was normal.  There was no peripheral edema.  GASTROINTESTINAL: The abdomen was normal in contour.  Bowel sounds were present.   Palpation detected no tenderness, mass, or enlarged organs.   MUSCULOSKELETAL: Skeletal configuration was normal and muscle mass was normal for age. Joint appearance was overall consistent with psoriatic arthritis LYMPHATIC: There were no enlarged nodes.  SKIN/HAIR/NAILS: Skin color was normal.  There were no abnormal skin lesions.  Hair and nails were normal.  NEUROLOGIC: The patient was alert and oriented to person, place, time, and circumstance. Speech was normal. Cranial nerves were normal. Motor strength was normal for age. The patient was normally coordinated.  PSYCHIATRIC:  Mood and affect are consistent with anxiety and the patient had normal recent and remote memory. The patient's judgment and insight were normal.          Recent Labs   Lab Test 10/01/21  1037 07/13/21  1632 05/14/21  1341 05/14/21  1341 02/05/21  1113 01/06/21  1726   HGB 15.5 15.8*   < > 16.3*   < > 17.2*    194   < > 214   < > 254   NA  --  142  --  143   < > 143   POTASSIUM  --  3.6  --  4.5   < > 3.9   CR  --  0.56  --  0.69   < > 0.64   A1C  --   --   --  5.4  --  5.9*    < > = values in this interval not displayed.        Diagnostics:  Labs pending at this time.  Results will be reviewed when  available.       Revised Cardiac Risk Index (RCRI):  The patient has the following serious cardiovascular risks for perioperative complications:   - High risk surgery (>5% cardiac complication risk) = 1 point     She has a history of type 2 diabetes that has been controlled.  She also has pulmonary hypertension      Start Time: 11 AM  End time:  11:39 AM  Face to face plus orders: 44 minutes  Documentation time:  3 minutes    The visit lasted a total of 47 minutes          Signed Electronically by: Loida Stockton MD

## 2021-10-01 NOTE — PROGRESS NOTES
Progress Note    Patient Name: Randi Cleary  Date: 10/1/21         Service Type: Individual      Session Start Time: 8:04 AM  Session End Time: 8:54 AM     Session Length: 50 minutes    Session #: 58    Attendees: Client attended alone    Service Modality:  Video Visit:    Telemedicine Visit: The patient's condition can be safely assessed and treated via synchronous audio and visual telemedicine encounter.      Reason for Telemedicine Visit: Services only offered telehealth    Originating Site (Patient Location): Patient's home    Distant Site (Provider Location): Provider Remote Setting- Home Office    Consent:  The patient/guardian has verbally consented to: the potential risks and benefits of telemedicine (video visit) versus in person care; bill my insurance or make self-payment for services provided; and responsibility for payment of non-covered services.     Mode of Communication:  Video Conference via [employer]    As the provider I attest to compliance with applicable laws and regulations related to telemedicine.      Treatment Plan Last Reviewed: 9/14/21  PHQ-9 / CHAVO-7 :   PHQ 8/26/2021 9/14/2021 9/28/2021   PHQ-9 Total Score 11 15 15   Q9: Thoughts of better off dead/self-harm past 2 weeks Not at all Not at all Not at all   F/U: Thoughts of suicide or self-harm - - -   F/U: Self harm-plan - - -   F/U: Self-harm action - - -   F/U: Safety concerns - - -   Some encounter information is confidential and restricted. Go to Review Flowsheets activity to see all data.     CHAVO-7 SCORE 8/26/2021 9/14/2021 9/28/2021   Total Score 9 (mild anxiety) 16 (severe anxiety) 18 (severe anxiety)   Total Score 9 16 18   Some encounter information is confidential and restricted. Go to Review Flowsheets activity to see all data.         DATA  Extended Session (53+ minutes): No  Interactive Complexity: No  Crisis: No      Progress Since Last Session (Related to Symptoms / Goals /  Homework):   Symptoms: overall feeling frustrated     Homework: Partially completed  Be kinder to others and to your own parts  Pay attention to physical sensations when having feelings    Continue to engage in behavioral activation, 10 minutes a day  Get back to the pool  Go to a meeting    Continue IFS     Episode of Care Goals: Satisfactory progress - ACTION (Actively working towards change); Intervened by reinforcing change plan / affirming steps taken     Current / Ongoing Stressors and Concerns:   Patient is currently socially isolated. She has a conflictual relationship with her .  She is getting minimal physical activity.  She had recent eye surgery     Treatment Objective(s) Addressed in This Session:   Patient will increase frequency of engaging her in ADLs.  Patient will track and record at least 5 pleasant exchanges with . Patient will be able to identify at least 5 positive traits about her .  Patient will reduce level of depressive and anxious features as evidenced by reduction in score on her CHAVO-7 and PHQ-9 (scores of 15 and 16 at first measurment, respectively).     Intervention:   CBT: Internal Family Systems Therapy: Patient voiced frustration with managing her healthcare within the system. Helped her process frustrations, look at what to let go of, and where to focus her energy.    ASSESSMENT: Current Emotional / Mental Status (status of significant symptoms):   Risk status (Self / Other harm or suicidal ideation)   Patient denies current fears or concerns for personal safety.   Patient denies current or recent suicidal ideation or behaviors.   Patient denies current or recent homicidal ideation or behaviors.   Patient denies current or recent self injurious behavior or ideation.   Patient denies other safety concerns.   Patient reports there has been no change in risk factors since their last session.     Patient reports there has been no change in protective factors since  their last session.     A safety and risk management plan has been developed including: Patient consented to co-developed safety plan.  Safety and risk management plan was completed.  Patient agreed to use safety plan should any safety concerns arise.  A copy was given to the patient. This was done on 11/24/2020 and is attached to the bottom of the note.     Appearance:   Appropriate    Eye Contact:   Fair    Psychomotor Behavior: Normal    Attitude:   Cooperative    Orientation:   All   Speech    Rate / Production: Normal/ Responsive    Volume:  Normal    Mood:    Anxious  Sad    Affect:    Worrisome    Thought Content:  Clear    Thought Form:  Coherent    Insight:    Fair      Medication Review:   No changes to current psychiatric medication(s)     Medication Compliance:   Yes     Changes in Health Issues:   Yes: pain from having teeth removed     Chemical Use Review:   Substance Use: decrease in use.  Patient reports frequency of use none since the third week of August.  Provided encouragement towards sobriety        Tobacco Use: No current tobacco use.      Diagnosis:  1. Major depressive disorder, recurrent episode, moderate with anxious distress (H)    2. CHAVO (generalized anxiety disorder)    R/O bipolar disorder  Collateral Reports Completed:   Not Applicable    PLAN: (Patient Tasks / Therapist Tasks / Other)  Focus on your health    Be kinder to others and to your own parts  Pay attention to physical sensations when having feelings    Continue to engage in behavioral activation, 10 minutes a day  Get back to the pool  Go to a meeting    MICAELA Johnson    October 1, 2021                                                         ______________________________________________________________________    Treatment Plan    Patient's Name: Randi Cleary  YOB: 1955    Date: 9/14/21    DSM5 Diagnoses: 296.32 (F33.1) Major Depressive Disorder, Recurrent Episode, Moderate With  "anxious distress  Psychosocial / Contextual Factors: Patient's entire family of origin has , she now has a sister-in-law and  as support.  Relationship with  is conflictual.  Patient is reporting increase in physical symptoms due to COVID-19.  Patient is off of pain medications.  WHODAS: 42    Referral / Collaboration:  Referral to another professional/service is not indicated at this time.     Anticipated number of session or this episode of care: 12-15      MeasurableTreatment Goal(s) related to diagnosis / functional impairment(s)  Goal 1: Patient will \"jumpstart, getting going with the things I need to be doing around the house as far as picking up, doing things, trying to do something every day.  Also to lessen the animosity between me and my .\"    I will know I've met my goal when my shoulders are fixed and I can see.      Objective #A (Patient Action)    Patient will increase frequency of engaging her in ADLs.  Status: Continued - Date(s): 21    Intervention(s)  Therapist will engage patient in CBT, specifically behavioral activation.    Objective #B  Patient will track and record at least 5 pleasant exchanges with . Patient will be able to identify at least 5 positive traits about her  and how he relates to her.  Status: Continued - Date(s): 21    Intervention(s)  Therapist will teach assertiveness skills and assign homework related to relationship interactions.    Objective #C  Patient will reduce level of depressive and anxious features as evidenced by reduction in score on her CHAVO-7 and PHQ-9 (scores of 15 and 16 at first measurment, respectively).  Status: Continued - Date(s):  21    Intervention(s)  Therapist will engage patient in person-centered therapy and CBT.    Patient has reviewed and agreed to the above plan.      MICAELA SLADE  2021                                                Randi VALDIVIA " "PLAN:  Step 1: Warning signs / cues (Thoughts, images, mood, situation, behavior) that a crisis may be developing:  ? Thoughts: \"I don't want to continue\" \"I am unwanted\"  ? Images: none  ? Thinking Processes: ruminating  ? Mood: anger  ? Behaviors: isolating/withdrawing , can't stop crying, not taking care of myself and not taking care of my responsibilities  ? Situations: small triggers, such as not being able to find something, or dropping something   Step 2: Coping strategies - Things I can do to take my mind off of my problems without contacting another person (relaxation technique, physical activity):  ? Distress Tolerance Strategies:  arts and crafts: drawing, play with my pet , listen to positive and upbeat music: any, change body temperature (ice pack/cold water)  and paced breathing/progressive muscle relaxation  ? Physical Activities: go for a walk, deep breathing and stretching   ? Focus on helpful thoughts:  \"You've been through this before, you can get through it again.\"  Step 3: People and social settings that provide distraction:                 Name: Carmen                            Name: Darien                           Name: Aleida       ? pool, shopping, Carmen's house, [employer]       Step 4: Remind myself of people and things that are important to me and worth living for:  Clifford Little Donna, post-COVID world, options of what could be in your future        Step 5: When I am in crisis, I can ask these people to help me use my safety plan:                 Name: Sidney  Step 6: Making the environment safe:   ? go to sleep/daydream  Step 7: Professionals or agencies I can contact during a crisis:  ? [facility] Daytime Number: [phone]  ? Suicide Prevention Lifeline: 0-572-739-TALK (8395)  ? Crisis Text Line Service (available 24 hours a day, 7 days a week): Text [place] to 405766    Local Crisis Services: [facility] Crisis: [phone]     Call 911 or go to my nearest emergency " department.       I helped develop this safety plan and agree to use it when needed.  I have been given a copy of this plan.       Client signature _________________________________________________________________  Today s date:  11/24/2020  Adapted from Safety Plan Template 2008 Randi Poole and Robby Barba is reprinted with the express permission of the authors.  No portion of the Safety Plan Template may be reproduced without the express, written permission.  You can contact the authors at [email or web address] or [email or web address].

## 2021-10-02 LAB
ATRIAL RATE - MUSE: 74 BPM
DIASTOLIC BLOOD PRESSURE - MUSE: NORMAL MMHG
INTERPRETATION ECG - MUSE: NORMAL
P AXIS - MUSE: 32 DEGREES
PR INTERVAL - MUSE: 178 MS
QRS DURATION - MUSE: 118 MS
QT - MUSE: 410 MS
QTC - MUSE: 455 MS
R AXIS - MUSE: 66 DEGREES
SYSTOLIC BLOOD PRESSURE - MUSE: NORMAL MMHG
T AXIS - MUSE: 32 DEGREES
VENTRICULAR RATE- MUSE: 74 BPM

## 2021-10-03 LAB
IRON SATN MFR SERPL: 65 % (ref 15–46)
IRON SERPL-MCNC: 182 UG/DL (ref 35–180)
TIBC SERPL-MCNC: 278 UG/DL (ref 240–430)

## 2021-10-04 ENCOUNTER — NURSE TRIAGE (OUTPATIENT)
Dept: NURSING | Facility: CLINIC | Age: 66
End: 2021-10-04

## 2021-10-04 DIAGNOSIS — J20.9 ACUTE BRONCHITIS, UNSPECIFIED ORGANISM: Primary | ICD-10-CM

## 2021-10-04 RX ORDER — AZITHROMYCIN 500 MG/1
500 TABLET, FILM COATED ORAL DAILY
Status: ACTIVE | OUTPATIENT
Start: 2021-10-04 | End: 2021-10-11

## 2021-10-04 RX ORDER — PREDNISONE 20 MG/1
20 TABLET ORAL DAILY
Qty: 7 TABLET | Refills: 0 | Status: SHIPPED | OUTPATIENT
Start: 2021-10-04 | End: 2021-10-11

## 2021-10-04 NOTE — TELEPHONE ENCOUNTER
Please let her know, I sent prescription for antibiotic and for prednisone to the [facility] pharmacy in [place].  Dr. Vanesa MD

## 2021-10-04 NOTE — TELEPHONE ENCOUNTER
Patient calls with concerns regarding a cough. Symptoms started on Saturday. Patient reports having post nasal drip and her throat burning. She is currently coughing up green phlegm.  had a cold about 2 weeks ago. Patient denies any chest pain, does report having some heaviness. Patient has COPD, does not report really any difficulty breathing outside of her normal. Patient is coughing on the phone, sounds very congested.    Patient is immunocompromised. She had covid in June, just had the booster shot and flu shot on Friday 10/1. Patient reports having a low grade temp Saturday night. She recently started Ethacrynic acid on Thursday, was off of lasix about a week before that. Patient is scheduled for surgery on 10/14 and is concerned that she is now sick before surgery.     Per protocol Go to ED now. Patient would like a message sent to her provider for recommendations at this time. She reports that typically she is given antibiotics and prednisone. Patient would be willing to come into the clinic if recommended. Please advise and contact patient.    Mehcelle Kathleen RN  [facility]          Reason for Disposition    SEVERE or constant chest pain or pressure (Exception: mild central chest pain, present only when coughing)    Additional Information    Negative: SEVERE difficulty breathing (e.g., struggling for each breath, speaks in single words)    Negative: Difficult to awaken or acting confused (e.g., disoriented, slurred speech)    Negative: Bluish (or gray) lips or face now    Negative: Shock suspected (e.g., cold/pale/clammy skin, too weak to stand, low BP, rapid pulse)    Negative: Sounds like a life-threatening emergency to the triager    Negative: [1] COVID-19 exposure AND [2] has not completed COVID-19 vaccine series AND [3] no symptoms    Negative: [1] COVID-19 exposure AND [2] completed COVID-19 vaccine series (fully vaccinated) AND [3] no symptoms    Negative: COVID-19 vaccine  reaction suspected (e.g., fever, headache, muscle aches) occurring during days 1-3 after getting vaccine    Negative: COVID-19 vaccine, questions about    Negative: [1] COVID-19 vaccine series completed (fully vaccinated) in past 3 months AND [2] new-onset of COVID-19 symptoms BUT [3] no known exposure    Negative: [1] Had lab test confirmed COVID-19 infection within last 3 monthsAND [2] new-onset of COVID-19 symptoms BUT [3] no known exposure    Negative: [1] Lives with someone known to have influenza (flu test positive) AND [2] flu-like symptoms (e.g., cough, runny nose, sore throat, SOB; with or without fever)    Negative: [1] Adult with possible COVID-19 symptoms AND [2] triager concerned about severity of symptoms or other causes    Negative: COVID-19 and breastfeeding, questions about    Protocols used: CORONAVIRUS (COVID-19) DIAGNOSED OR [id number] 3.25  COVID 19 Nurse Triage Plan/Patient Instructions    Please be aware that novel coronavirus (COVID-19) may be circulating in the community. If you develop symptoms such as fever, cough, or SOB or if you have concerns about the presence of another infection including coronavirus (COVID-19), please contact your health care provider or visit [email or web address].     Disposition/Instructions    ED Visit recommended. Follow protocol based instructions.     Bring Your Own Device:  Please also bring your smart device(s) (smart phones, tablets, laptops) and their charging cables for your personal use and to communicate with your care team during your visit.    Thank you for taking steps to prevent the spread of this virus.  o Limit your contact with others.  o Wear a simple mask to cover your cough.  o Wash your hands well and often.    Resources    [facility]: About COVID-19: [email or web address]    CDC: What to Do If You're Sick: www.cdc.gov/coronavirus/2019-ncov/about/steps-when-sick.html    CDC: Ending Home Isolation:  www.cdc.gov/coronavirus/2019-ncov/hcp/disposition-in-home-patients.html     CDC: Caring for Someone: www.cdc.gov/coronavirus/2019-ncov/if-you-are-sick/care-for-someone.html     [facility]: Interim Guidance for Hospital Discharge to Home: www.[facility]/diseases/coronavirus/hcp/hospdischarge.pdf    [facility] clinical trials (COVID-19 research studies): clinicalaffairs.[facility]/n-clinical-trials     Below are the COVID-19 hotlines at the [place] Department of Health ([facility]). Interpreters are available.   o For health questions: Call 003-380-6226 or 1-861.885.9976 (7 a.m. to 7 p.m.)  o For questions about schools and childcare: Call 657-307-9788 or 1-925.559.2692 (7 a.m. to 7 p.m.)

## 2021-10-07 ENCOUNTER — MYC MEDICAL ADVICE (OUTPATIENT)
Dept: CARDIOLOGY | Facility: CLINIC | Age: 66
End: 2021-10-07

## 2021-10-07 DIAGNOSIS — I50.9 HEART FAILURE (H): Primary | ICD-10-CM

## 2021-10-07 DIAGNOSIS — I27.20 PULMONARY HYPERTENSION (H): ICD-10-CM

## 2021-10-07 DIAGNOSIS — R06.02 SOB (SHORTNESS OF BREATH): ICD-10-CM

## 2021-10-07 RX ORDER — ETHACRYNIC ACID 25 MG/1
TABLET ORAL
Qty: 30 TABLET | Refills: 11 | COMMUNITY
Start: 2021-10-07 | End: 2022-02-23

## 2021-10-07 NOTE — TELEPHONE ENCOUNTER
"patient left multiple VM and long [employer] message with the same information.  -----------------------------  Called patient back and she denies she has been weighing herself daily, but here are the weights she does know;     230.5lbs Friday  226.4lbs today (-4.1 lbs)    She took 25mg x3 days and 12.5mg on Wednesday.    patient has been coughing so hard, she was throwing up frothy like water.  After cutting the tablet in half yesterday, \"it went better\".  Advised her I would call Dr. Edwards to discuss and call her back with the recommendations.  ---------------------------------  Discussed with Dr. Edwards who recommends she have labs (BNP & BMP) tomorrow and decrease dose to 25mg once daily two days per week.  ---------------------------------  Called patient and updated her to Dr. Edwards's recommendations. Pt stated she would take the pill on Saturday's and Wednesday's moving forward.  Agreed with plan.  Asked her to call me if her weight start to climb. Patient verbalized understanding, agreed with plan and denied any further questions. Eli Hilton RN on 10/7/2021 at 5:31 PM      Labs ordered.  "

## 2021-10-09 DIAGNOSIS — I10 ESSENTIAL HYPERTENSION: ICD-10-CM

## 2021-10-11 ENCOUNTER — TELEPHONE (OUTPATIENT)
Dept: CARDIOLOGY | Facility: CLINIC | Age: 66
End: 2021-10-11

## 2021-10-11 ENCOUNTER — LAB (OUTPATIENT)
Dept: LAB | Facility: CLINIC | Age: 66
End: 2021-10-11
Payer: MEDICARE

## 2021-10-11 DIAGNOSIS — R06.02 SOB (SHORTNESS OF BREATH): ICD-10-CM

## 2021-10-11 DIAGNOSIS — I27.20 PULMONARY HYPERTENSION (H): ICD-10-CM

## 2021-10-11 DIAGNOSIS — I50.9 HEART FAILURE (H): ICD-10-CM

## 2021-10-11 LAB
ANION GAP SERPL CALCULATED.3IONS-SCNC: 12 MMOL/L (ref 5–18)
BUN SERPL-MCNC: 9 MG/DL (ref 8–22)
CALCIUM SERPL-MCNC: 9.7 MG/DL (ref 8.5–10.5)
CHLORIDE BLD-SCNC: 104 MMOL/L (ref 98–107)
CO2 SERPL-SCNC: 24 MMOL/L (ref 22–31)
CREAT SERPL-MCNC: 0.7 MG/DL (ref 0.6–1.1)
GFR SERPL CREATININE-BSD FRML MDRD: 89 ML/MIN/1.73M2
GLUCOSE BLD-MCNC: 106 MG/DL (ref 70–125)
POTASSIUM BLD-SCNC: 3.7 MMOL/L (ref 3.5–5)
SODIUM SERPL-SCNC: 140 MMOL/L (ref 136–145)

## 2021-10-11 PROCEDURE — 80048 BASIC METABOLIC PNL TOTAL CA: CPT

## 2021-10-11 PROCEDURE — 83880 ASSAY OF NATRIURETIC PEPTIDE: CPT

## 2021-10-11 PROCEDURE — 36415 COLL VENOUS BLD VENIPUNCTURE: CPT

## 2021-10-11 NOTE — TELEPHONE ENCOUNTER
Moved conversation to this encounter to alleviate confusion as we have been communicating via multiple [employer] messages.    Weights so far after starting Ethacrynic Acid;    Friday 10/1  230.5lbs   Thursday 10/7  226.4lbs (-4.1 lbs)     Saturday 10/9  224.5  Jacoby 10/10   225.3 (-5.2 lbs)    Labs today, Monday 10/11    Component      Latest Ref Rng & Units 10/11/2021   Sodium      136 - 145 mmol/L 140   Potassium      3.5 - 5.0 mmol/L 3.7   Chloride      98 - 107 mmol/L 104   Carbon Dioxide      22 - 31 mmol/L 24   Anion Gap      5 - 18 mmol/L 12   Urea Nitrogen      8 - 22 mg/dL 9   Creatinine      0.60 - 1.10 mg/dL 0.70   Calcium      8.5 - 10.5 mg/dL 9.7   Glucose      70 - 125 mg/dL 106   GFR Estimate      >60 mL/min/1.73m2 89     Sent patient [employer] message regarding good lab outcome, but waiting for BNP.  Eli Hilton RN on 10/12/2021 at 8:16 AM    Component      Latest Ref Rng & Units 10/11/2021   N-Terminal Pro Bnp      0 - 125 pg/mL 142 (H)     BNP stable.

## 2021-10-12 DIAGNOSIS — J20.9 ACUTE BRONCHITIS, UNSPECIFIED ORGANISM: ICD-10-CM

## 2021-10-12 LAB — NT-PROBNP SERPL-MCNC: 142 PG/ML (ref 0–125)

## 2021-10-12 RX ORDER — AZITHROMYCIN 500 MG/1
500 TABLET, FILM COATED ORAL DAILY
Qty: 7 TABLET | Refills: 0 | Status: SHIPPED | OUTPATIENT
Start: 2021-10-12 | End: 2021-10-22

## 2021-10-12 ASSESSMENT — ANXIETY QUESTIONNAIRES
7. FEELING AFRAID AS IF SOMETHING AWFUL MIGHT HAPPEN: SEVERAL DAYS
3. WORRYING TOO MUCH ABOUT DIFFERENT THINGS: MORE THAN HALF THE DAYS
7. FEELING AFRAID AS IF SOMETHING AWFUL MIGHT HAPPEN: SEVERAL DAYS
1. FEELING NERVOUS, ANXIOUS, OR ON EDGE: NEARLY EVERY DAY
GAD7 TOTAL SCORE: 15
2. NOT BEING ABLE TO STOP OR CONTROL WORRYING: MORE THAN HALF THE DAYS
4. TROUBLE RELAXING: MORE THAN HALF THE DAYS
GAD7 TOTAL SCORE: 15
5. BEING SO RESTLESS THAT IT IS HARD TO SIT STILL: MORE THAN HALF THE DAYS
8. IF YOU CHECKED OFF ANY PROBLEMS, HOW DIFFICULT HAVE THESE MADE IT FOR YOU TO DO YOUR WORK, TAKE CARE OF THINGS AT HOME, OR GET ALONG WITH OTHER PEOPLE?: EXTREMELY DIFFICULT
6. BECOMING EASILY ANNOYED OR IRRITABLE: NEARLY EVERY DAY
GAD7 TOTAL SCORE: 15

## 2021-10-12 ASSESSMENT — PATIENT HEALTH QUESTIONNAIRE - PHQ9
SUM OF ALL RESPONSES TO PHQ QUESTIONS 1-9: 19
10. IF YOU CHECKED OFF ANY PROBLEMS, HOW DIFFICULT HAVE THESE PROBLEMS MADE IT FOR YOU TO DO YOUR WORK, TAKE CARE OF THINGS AT HOME, OR GET ALONG WITH OTHER PEOPLE: EXTREMELY DIFFICULT
SUM OF ALL RESPONSES TO PHQ QUESTIONS 1-9: 19

## 2021-10-12 NOTE — TELEPHONE ENCOUNTER
losartan (COZAAR) 25 MG tablet      Last Written Prescription Date:  8-  NOT SENT TO PHARMACY AND NOT SIGNED BY CARDIOLOGY    Last Office Visit : 9-1-2021  Future Office visit:  11-    Routing refill request to provider for review/approval because:  NOT SENT TO PHARMACY AND NOT SIGNED BY CARDIOLOGY  Who should be managing PCP vs cardiology?      Kathleen M Doege RN

## 2021-10-13 ENCOUNTER — VIRTUAL VISIT (OUTPATIENT)
Dept: PSYCHOLOGY | Facility: CLINIC | Age: 66
End: 2021-10-13
Payer: MEDICARE

## 2021-10-13 DIAGNOSIS — F41.1 GAD (GENERALIZED ANXIETY DISORDER): ICD-10-CM

## 2021-10-13 DIAGNOSIS — F33.1 MAJOR DEPRESSIVE DISORDER, RECURRENT EPISODE, MODERATE WITH ANXIOUS DISTRESS (H): Primary | ICD-10-CM

## 2021-10-13 PROCEDURE — 90834 PSYTX W PT 45 MINUTES: CPT | Mod: 95 | Performed by: SOCIAL WORKER

## 2021-10-13 RX ORDER — AZITHROMYCIN 500 MG/1
500 TABLET, FILM COATED ORAL DAILY
Qty: 7 TABLET | Refills: 0 | OUTPATIENT
Start: 2021-10-13 | End: 2021-10-20

## 2021-10-13 ASSESSMENT — ANXIETY QUESTIONNAIRES: GAD7 TOTAL SCORE: 15

## 2021-10-13 ASSESSMENT — PATIENT HEALTH QUESTIONNAIRE - PHQ9: SUM OF ALL RESPONSES TO PHQ QUESTIONS 1-9: 19

## 2021-10-13 NOTE — PROGRESS NOTES
Progress Note    Patient Name: Randi Cleary  Date: 10/13/21         Service Type: Individual      Session Start Time: 3:05 PM  Session End Time: 3:50 PM     Session Length: 45 minutes    Session #: 59    Attendees: Client attended alone    Service Modality:  Video Visit:    Telemedicine Visit: The patient's condition can be safely assessed and treated via synchronous audio and visual telemedicine encounter.      Reason for Telemedicine Visit: Services only offered telehealth    Originating Site (Patient Location): Patient's home    Distant Site (Provider Location): Provider Remote Setting- Home Office    Consent:  The patient/guardian has verbally consented to: the potential risks and benefits of telemedicine (video visit) versus in person care; bill my insurance or make self-payment for services provided; and responsibility for payment of non-covered services.     Mode of Communication:  Video Conference via bead Button    As the provider I attest to compliance with applicable laws and regulations related to telemedicine.      Treatment Plan Last Reviewed: 9/14/21  PHQ-9 / CHAVO-7 :   PHQ 9/14/2021 9/28/2021 10/12/2021   PHQ-9 Total Score 15 15 19   Q9: Thoughts of better off dead/self-harm past 2 weeks Not at all Not at all Several days   F/U: Thoughts of suicide or self-harm - - No   F/U: Self harm-plan - - -   F/U: Self-harm action - - -   F/U: Safety concerns - - No   Some encounter information is confidential and restricted. Go to Review Flowsheets activity to see all data.     CHAVO-7 SCORE 9/14/2021 9/28/2021 10/12/2021   Total Score 16 (severe anxiety) 18 (severe anxiety) 15 (severe anxiety)   Total Score 16 18 15   Some encounter information is confidential and restricted. Go to Review Flowsheets activity to see all data.         DATA  Extended Session (53+ minutes): No  Interactive Complexity: No  Crisis: No      Progress Since Last Session (Related to Symptoms /  "Goals / Homework):   Symptoms: Worsening increase in symptoms, easily frustrated and overwhelmed     Homework: Partially completed  Focus on your health    Be kinder to others and to your own parts  Pay attention to physical sensations when having feelings    Continue to engage in behavioral activation, 10 minutes a day  Get back to the pool  Go to a meeting     Episode of Care Goals: Satisfactory progress - ACTION (Actively working towards change); Intervened by reinforcing change plan / affirming steps taken     Current / Ongoing Stressors and Concerns:   Patient is currently socially isolated. She has a conflictual relationship with her .  She is getting minimal physical activity.  She had recent eye surgery     Treatment Objective(s) Addressed in This Session:   Patient will increase frequency of engaging her in ADLs.  Patient will track and record at least 5 pleasant exchanges with . Patient will be able to identify at least 5 positive traits about her .  Patient will reduce level of depressive and anxious features as evidenced by reduction in score on her CHAVO-7 and PHQ-9 (scores of 15 and 16 at first measurment, respectively).     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 Helped Winnie regulate through socratic dialogue. Assessed for suicidal ideation due to Winnie's answer on the PHQ-9. She reported no active plans or intent, but the thought  \"If this is what life is, I don't want to be here.\"     ASSESSMENT: Current Emotional / Mental Status (status of significant symptoms):   Risk status (Self / Other harm or suicidal ideation)   Patient denies current fears or concerns for personal safety.   Patient reports the following current or recent suicidal ideation or behaviors: passive ideation of the thought \"If this is what life is, I " "don't want to be here,\" but has no plans or intent.   Patient denies current or recent homicidal ideation or behaviors.   Patient denies current or recent self injurious behavior or ideation.   Patient denies other safety concerns.   Patient reports there has been no change in risk factors since their last session.     Patient reports there has been no change in protective factors since their last session.     A safety and risk management plan has been developed including: Patient consented to co-developed safety plan.  Safety and risk management plan was completed.  Patient agreed to use safety plan should any safety concerns arise.  A copy was given to the patient. This was done on 11/24/2020 and is attached to the bottom of the note.     Appearance:   Appropriate    Eye Contact:   Fair    Psychomotor Behavior: Normal    Attitude:   Cooperative    Orientation:   All   Speech    Rate / Production: Normal/ Responsive    Volume:  Normal    Mood:    Anxious  Sad    Affect:    Worrisome    Thought Content:  Clear    Thought Form:  Coherent    Insight:    Fair      Medication Review:   No changes to current psychiatric medication(s)     Medication Compliance:   Yes     Changes in Health Issues:   Yes: chronic bronchitis     Chemical Use Review:   Substance Use: decrease in use.  Patient reports frequency of use none since the third week of August.  Provided encouragement towards sobriety  Much positive feedback for finding alternative coping despite increased stressers over the past two weeks        Tobacco Use: No current tobacco use.      Diagnosis:  1. Major depressive disorder, recurrent episode, moderate with anxious distress (H)    2. CHAVO (generalized anxiety disorder)    R/O bipolar disorder  Collateral Reports Completed:   Not Applicable    PLAN: (Patient Tasks / Therapist Tasks / Other)  Focus on your health    Be kinder to others and to your own parts  Pay attention to physical sensations when having " "feelings    Continue to engage in behavioral activation, 10 minutes a day  Get back to the pool  Go to a meeting    Continue CHUCK ARROYO MICAELA BAKER JO    2021                                                         ______________________________________________________________________    Treatment Plan    Patient's Name: Randi Cleary  YOB: 1955    Date: 21    DSM5 Diagnoses: 296.32 (F33.1) Major Depressive Disorder, Recurrent Episode, Moderate With anxious distress  Psychosocial / Contextual Factors: Patient's entire family of origin has , she now has a sister-in-law and  as support.  Relationship with  is conflictual.  Patient is reporting increase in physical symptoms due to COVID-19.  Patient is off of pain medications.  WHODAS: 42    Referral / Collaboration:  Referral to another professional/service is not indicated at this time.     Anticipated number of session or this episode of care: 12-15      MeasurableTreatment Goal(s) related to diagnosis / functional impairment(s)  Goal 1: Patient will \"jumpstart, getting going with the things I need to be doing around the house as far as picking up, doing things, trying to do something every day.  Also to lessen the animosity between me and my .\"    I will know I've met my goal when my shoulders are fixed and I can see.      Objective #A (Patient Action)    Patient will increase frequency of engaging her in ADLs.  Status: Continued - Date(s): 21    Intervention(s)  Therapist will engage patient in CBT, specifically behavioral activation.    Objective #B  Patient will track and record at least 5 pleasant exchanges with . Patient will be able to identify at least 5 positive traits about her  and how he relates to her.  Status: Continued - Date(s): 21    Intervention(s)  Therapist will teach assertiveness skills and assign homework related to relationship interactions.    Objective " "#C  Patient will reduce level of depressive and anxious features as evidenced by reduction in score on her CHAVO-7 and PHQ-9 (scores of 15 and 16 at first measurment, respectively).  Status: Continued - Date(s):  9/14/21    Intervention(s)  Therapist will engage patient in person-centered therapy and CBT.    Patient has reviewed and agreed to the above plan.      MICAELA SLADE  September 14, 2021                                                Randi Cleary          SAFETY PLAN:  Step 1: Warning signs / cues (Thoughts, images, mood, situation, behavior) that a crisis may be developing:  ? Thoughts: \"I don't want to continue\" \"I am unwanted\"  ? Images: none  ? Thinking Processes: ruminating  ? Mood: anger  ? Behaviors: isolating/withdrawing , can't stop crying, not taking care of myself and not taking care of my responsibilities  ? Situations: small triggers, such as not being able to find something, or dropping something   Step 2: Coping strategies - Things I can do to take my mind off of my problems without contacting another person (relaxation technique, physical activity):  ? Distress Tolerance Strategies:  arts and crafts: drawing, play with my pet , listen to positive and upbeat music: any, change body temperature (ice pack/cold water)  and paced breathing/progressive muscle relaxation  ? Physical Activities: go for a walk, deep breathing and stretching   ? Focus on helpful thoughts:  \"You've been through this before, you can get through it again.\"  Step 3: People and social settings that provide distraction:                 Name: Carmen                            Name: Darien                           Name: Aleida       ? pool, shopping, Carmen's house, [employer]       Step 4: Remind myself of people and things that are important to me and worth living for:  Clifford Little Donna, post-COVID world, options of what could be in your future        Step 5: When I am in crisis, I can ask these people to help me use " my safety plan:                 Name: Sidney  Step 6: Making the environment safe:   ? go to sleep/daydream  Step 7: Professionals or agencies I can contact during a crisis:  ? [facility] Number: [phone]  ? Suicide Prevention Lifeline: 3-928-406-TALK (7223)  ? Crisis Text Line Service (available 24 hours a day, 7 days a week): Text [place] to 803116    Local Crisis Services: [facility] Crisis: [phone]     Call 911 or go to my nearest emergency department.       I helped develop this safety plan and agree to use it when needed.  I have been given a copy of this plan.       Client signature _________________________________________________________________  Today s date:  11/24/2020  Adapted from Safety Plan Template 2008 Radni Poole and Robby Barba is reprinted with the express permission of the authors.  No portion of the Safety Plan Template may be reproduced without the express, written permission.  You can contact the authors at [email or web address] or [email or web address].

## 2021-10-14 ENCOUNTER — PATIENT OUTREACH (OUTPATIENT)
Dept: NURSING | Facility: CLINIC | Age: 66
End: 2021-10-14

## 2021-10-14 ENCOUNTER — HOSPITAL ENCOUNTER (OUTPATIENT)
Dept: RADIOLOGY | Facility: HOSPITAL | Age: 66
Discharge: HOME | End: 2021-10-14
Attending: INTERNAL MEDICINE | Admitting: INTERNAL MEDICINE
Payer: MEDICARE

## 2021-10-14 ENCOUNTER — OFFICE VISIT (OUTPATIENT)
Dept: PULMONOLOGY | Facility: OTHER | Age: 66
End: 2021-10-14
Payer: MEDICARE

## 2021-10-14 VITALS
BODY MASS INDEX: 38.43 KG/M2 | WEIGHT: 223.9 LBS | HEART RATE: 96 BPM | DIASTOLIC BLOOD PRESSURE: 72 MMHG | OXYGEN SATURATION: 96 % | SYSTOLIC BLOOD PRESSURE: 112 MMHG

## 2021-10-14 DIAGNOSIS — J44.9 CHRONIC OBSTRUCTIVE PULMONARY DISEASE, UNSPECIFIED COPD TYPE (H): ICD-10-CM

## 2021-10-14 PROCEDURE — 99214 OFFICE O/P EST MOD 30 MIN: CPT | Performed by: INTERNAL MEDICINE

## 2021-10-14 PROCEDURE — 71046 X-RAY EXAM CHEST 2 VIEWS: CPT

## 2021-10-14 RX ORDER — ALBUTEROL SULFATE 90 UG/1
2 AEROSOL, METERED RESPIRATORY (INHALATION) EVERY 6 HOURS
Qty: 1 EACH | Refills: 11 | Status: SHIPPED | OUTPATIENT
Start: 2021-10-14 | End: 2021-10-14

## 2021-10-14 RX ORDER — MULTIVITAMIN WITH IRON
1 TABLET ORAL DAILY
COMMUNITY
End: 2022-06-02

## 2021-10-14 RX ORDER — ALBUTEROL SULFATE 90 UG/1
2 AEROSOL, METERED RESPIRATORY (INHALATION) EVERY 6 HOURS
Qty: 1 EACH | Refills: 11 | Status: SHIPPED | OUTPATIENT
Start: 2021-10-14 | End: 2021-10-18

## 2021-10-14 RX ORDER — ALBUTEROL SULFATE 90 UG/1
2 AEROSOL, METERED RESPIRATORY (INHALATION) EVERY 6 HOURS
Qty: 1 EACH | Refills: 11 | Status: SHIPPED | OUTPATIENT
Start: 2021-10-14 | End: 2021-10-14 | Stop reason: ALTCHOICE

## 2021-10-14 NOTE — PATIENT INSTRUCTIONS
It was a pleasure to see you today.    We will repeat an x-ray of your chest today. I will contact you with the results. Continue to take the azithromycin.    For your post nasal drip, I recommend you use an anti-histamine (claritin, zyrtec), nasal spray including flonase, available from the pharmacy.      I refilled your trelegy and albuterol inhaler.      Riena Morillo MD  Pulmonary and Critical Care Medicine  [facility]  Office: [phone]

## 2021-10-14 NOTE — PROGRESS NOTES
"Clinic Care Coordination Contact    Community Health Worker Follow Up    Care Gaps:     Health Maintenance Due   Topic Date Due     HF ACTION PLAN  Never done     MICROALBUMIN  Never done     DIABETIC FOOT EXAM  Never done     ADVANCE CARE PLANNING  Never done     COPD ACTION PLAN  Never done     EYE EXAM  Never done     ZOSTER IMMUNIZATION (1 of 2) Never done     DTAP/TDAP/TD IMMUNIZATION (2 - Tdap) 05/21/2018     MEDICARE ANNUAL WELLNESS VISIT  Never done     A1C  08/14/2021       not discussed at CHW outreach call today    Goals:   Goals Addressed as of 10/14/2021 at 3:59 PM                    Today    9/9/21       Mental Health Management (pt-stated)   60%  60%    Added 8/4/21 by Cari Denise      Goal Statement: I would like to have my depression under better control in the next 6 months.   Date Goal set: 12/29/20   Barriers: Long history of depression   Strengths: Strong advocate for herself   Date to Achieve By: 5/29/21   Patient expressed understanding of goal: Yes   Action steps to achieve this goal:   1. I will continue to attend all scheduled appointments with Dr. Dubois and my PCP and will follow recommendations.   2. I will take my medications ordered by Dr. Dubois as prescribed.   3. I will continue to attend all scheduled appointments with my therapist, Corine.   4. I have returned to swimming at the [employer] as I know this helps me both physically and mentally.   5. I will continue to participate in activities that I enjoy and keeps me busy at home.   6. I will continue to report progress towards this goals at scheduled outreach telephone calls from the [facility] team.     1/27/2021- Updated and discussed   2/8/21 - Updated and discussed.   3/12/21 - Discussed   4/13= discussed   6/10/21 - Discussed and Updated  Discussed 9/9/21, 9-14  10/14- not discussed            Intervention and Education during outreach: n/a    Patient answered the phone, CHW introduced herself- patient asked \"what do " "you want?\" CHw stated she wanted to update goal progression.  Patient said thanks for call and hung up on CHW    CHW Plan: Monthly outreach calls to discuss depression goal    Next Outreach: 11/15/2021  "

## 2021-10-14 NOTE — PROGRESS NOTES
Pulmonary Clinic Outpatient Progress Note:    Assessment and Plan:   This is a 64 y F with depression/anxiety, alcohol abuse, severe sleep apnea, and possible mild OHS not on CPAP, morbid obesity (BMI 42), 44+ pack year tobacco history in remission, COPD with mild osbtruction, GERD and hiatal hernia, chronic pain on opiods, hypothyroid after tx for Graves, pheochromocytoma resected ~2 years ago, who presents for follow-up of COPD.     Also following w Dr. Edwards at the [facility], had RHC consistent with pulmonary venuous HTN, started on lasix.     Recommendations:  COPD has been well controlled since last visit, on trelegy and prn albuterol  She is generally using CPAP, has had some equipment issues of late    Sounds as though she had an exacerbation following her pre-op visit 10/1 in the setting of receiving a COVID Moderna booster, flu shot, she also expressed the possibility of some sick contacts at the same time. Given her ongoing symptoms, we repeated a CXR today which remains clear. SpO2 is 96% and lungs are clear. No indication for additional antibiotics.     It will have been 3 weeks since a possible AECOPD. Objectively there are no contraindications to surgery in a week from my standpoint. The patient herself wants to wait as she notes she does not generally feel well enough to undergo major surgery. I do not know the urgency of her surgery, and would need to defer to neurosurgery regarding this.     I recommended flonase and an anti-histamine for her post nasal drip.       Follow up with me in 6 months.      Reina Morillo MD  Pulmonary and Critical Care Medicine  [facility]  Office: [phone]    ----------------------    CC: COPD follow up    Interval HPI:   Lots of medical contact since our last visit, mainly non-pulmonary in nature, no AECOPD since March 2020, until last week.  Has had issues with anxiety, depression, and medications. Pressured speech, and anxious today    Supposed to  have cervical spine surgery in 1 week.   Had pre-op visit 10/1, received Moderna booster (?) and flu shot 10/1, since then noted increased productive cough, dyspnea. She has received a course of azithromycin x2, and prednisone. She notes she feels better, but not at 100%. Has ongoing hoarse cough and PND. She had clear CXR as part of pre-op visit.     Currently using trelegy daily, minimal albuterol use.    Does not want to have surgery next week, notes she does not feel well enough.      -------------------  This is a 64 y F with depression/anxiety, alcohol abuse, severe sleep apnea, not on CPAP, morbid obesity (BMI 42), 44+ pack year tobacco history in remission, COPD, GERD and hiatal hernia, chronic pain on opiods, hypothyroid after tx for Graves, pheochromocytoma resected ~2 years ago, who presents for an evaluation of COPD and exertional dyspnea.     She was very emotionally labile, tangential, and difficult to obtain a history from during our visit.     She reports she has had long standing ANNA. It has gotten worse over time, which she correlates with a 60+ lbs weight gain over the last 2 years. She has a chronic cough productive of clear phlegm. She notes significant depressive symptoms but no SI, feels no energy or motivation. She reports a recent relapse of alcohol abuse, but tells me she has been sober for 1 month however reported at [facility] visit 1 week ago she was drinking 2 bottles of wine daily.    She has severe KYAW per PSG but has not been using CPAP. She was referred to sleep again and has an appointment next month.    For her COPD she hs been on Symbicort intermittently due to cost, she just picked up another supply and has been back on for ~1 month. She is uncertain if it helps. She ran out of albuterol last month. CAT 31 today. Not certain of her last exacerbation. She quit smoking ~20 years ago.    She recently saw Dr. Edwards at [facility] for a pulmonary HTN evaluation. She had an enlarged PA on  a CT PE. The echo done there was unable to estimate PA pressure or diastolic function. It showed a mild reduction in RV function and was otherwise normal. She was referred for a sleep study and to hepatology for a history of possible hereditary hemochromatosis.     ROS:  A 12-system review was obtained and was negative with the exception of the symptoms endorsed in the history of present illness.    PMH:  Past Medical History:   Diagnosis Date     Anxiety      Breast cancer (H) 1994     Chronic pain syndrome      COPD (chronic obstructive pulmonary disease) (H)      Depression      Esophageal reflux      Graves disease      Hemochromatosis      Hx antineoplastic chemotherapy      Hx of radiation therapy      Hypertension      Impaired fasting glucose      Pheochromocytoma      Psoriasis      Psoriatic arthropathy (H)      Restless leg syndrome      Right rotator cuff tear      Sleep apnea 2017    CPAP     Spinal stenosis      Urinary incontinence      Vitamin B12 deficiency      Social Hx:  Tobacco: 2-3 PPD for 22 years, quit >20 years ago  Prior drug abuse, EtOH dependency, had been sober 20 years now relapsed, nothing in the last 1 month  Disabled, then retired, worked in [employer]  Currently lives in [place] with   1 dog, 1 cat   No travel in 2 years    Physical Exam:  /72   Pulse 96   Wt 101.6 kg (223 lb 14.4 oz)   SpO2 96%   BMI 38.43 kg/m      Gen: Alert,oriented, no distress  HEENT: nasal turbinates are unremarkable, no oropharyngeal lesions, no cervical or supraclavicular lymphadenopathy  CV: RRR, no M/G/R  Resp: Decreased, CTAB, no focal crackles or wheezes  Abd: obese, soft, nontender, no palpable organomegaly  Skin: no apparent rashes  Ext: no cyanosis, clubbing, no edema  Neuro: alert, nonfocal    Labs:  Reviewed  ABG 7.40/43    Imaging studies:  Personally reviewed:    6/24/19 CT PE:  ANGIOGRAM CHEST: Atherosclerotic plaque including coronary artery calcification. No aortic aneurysm  or dissection. Mildly prominent right and left main pulmonary arteries suggestive of pulmonary artery hypertension. No pulmonary emboli.     RV/LV RATIO: N/A     LUNGS AND PLEURA: Stable benign 4 mm pulmonary nodule pleural-based on image #77 at the left lung base. Mild to moderate scattered fibroatelectasis. Diffuse air trapping often associated with small vessel or small airways disease. No effusion.     MEDIASTINUM: Moderate sized hiatal hernia. No mass or adenopathy.     LIMITED UPPER ABDOMEN: Hepatic steatosis. Left adrenal mass on prior not imaged.     MUSCULOSKELETAL: Degenerative disease. Mastectomy with reconstruction as on prior.     CONCLUSION:  1.  Evidence for pulmonary artery hypertension. No pulmonary emboli, aortic aneurysm or dissection. Atherosclerotic plaque including coronary artery disease.  2.  Moderate-sized hiatal hernia.  3.  Hepatic steatosis.  4.  Remainder stable.    8/21/19 PFT's  FEV1/FVC 65 FEV1 72 FVC 87  Negative BD response  TLC 93  RV/  DLCO ralph 81%  FVL has scooping of expiratory limb consistent with obstruction    Mild obstruction  Normal lung volumes  Normal diffusion capacity    Right Heart Cath (from the [facility])  PA  Systolic:41 mmHg  Diasotlic: 24 mmHg  Mean: 31 mmHg  HR: 88 bpm  PA Sat: 70.8%    PCW  A-wave: 18 mmHg  V-wave: 17 mmHg  Mean: 16 mmHg  HR: 91 bpm    Cardiac Output  CO Jeannie: 5.72 L/min  CI Jeannie: 2.56 L/min/m2  CO TD: Not performed  CI TD: Not performed     8/13/19 Echo (care everywhere)  Unable to assess diastolic function  Interpretation Summary  1. Global and regional left ventricular function is normal with an EF of 55-  60%.  2. The right ventricle is normal size. Global right ventricular function is mildly reduced.  3. No significant valvular disease.  4. Unable to assess pulmonary artery pressure.  5. IVC diameter <2.1 cm collapsing >50% with sniff suggests a normal RA  pressure of 3 mmHg.    There is no prior study for direct comparison.

## 2021-10-15 ENCOUNTER — TELEPHONE (OUTPATIENT)
Dept: PSYCHOLOGY | Facility: CLINIC | Age: 66
End: 2021-10-15
Payer: MEDICARE

## 2021-10-15 RX ORDER — LOSARTAN POTASSIUM 25 MG/1
25 TABLET ORAL DAILY
Qty: 90 TABLET | Refills: 2 | Status: SHIPPED | OUTPATIENT
Start: 2021-10-15 | End: 2022-06-02

## 2021-10-15 NOTE — TELEPHONE ENCOUNTER
Medication Refill double check:    Last virtual visit was on 9/1/21 with .    Follow up was recommended for after surgery.    Any additional encounters with changes to requested med? no    Authorizing provider is: Dr. Edwards    Refill was approved.     Additional orders/notes:       Eli Hilton RN on 10/15/2021 at 12:50 PM

## 2021-10-15 NOTE — TELEPHONE ENCOUNTER
Called Winnie per her request. Winnie was distressed about some notes in her mychart and feeling like she needs a break from all health care visits. Encouraged Winnie to take some time and allow her some of her frustration to reduce before making decisions.

## 2021-10-18 ENCOUNTER — TELEPHONE (OUTPATIENT)
Dept: PALLIATIVE MEDICINE | Facility: OTHER | Age: 66
End: 2021-10-18

## 2021-10-18 ENCOUNTER — OFFICE VISIT (OUTPATIENT)
Dept: FAMILY MEDICINE | Facility: CLINIC | Age: 66
End: 2021-10-18
Payer: MEDICARE

## 2021-10-18 VITALS
DIASTOLIC BLOOD PRESSURE: 68 MMHG | HEIGHT: 64 IN | HEART RATE: 86 BPM | BODY MASS INDEX: 37.73 KG/M2 | OXYGEN SATURATION: 97 % | WEIGHT: 221 LBS | SYSTOLIC BLOOD PRESSURE: 118 MMHG

## 2021-10-18 DIAGNOSIS — F41.1 GENERALIZED ANXIETY DISORDER: ICD-10-CM

## 2021-10-18 DIAGNOSIS — R73.03 PREDIABETES: ICD-10-CM

## 2021-10-18 DIAGNOSIS — S13.4XXS WHIPLASH INJURY TO NECK, SEQUELA: ICD-10-CM

## 2021-10-18 DIAGNOSIS — Z86.018 HISTORY OF PHEOCHROMOCYTOMA: Primary | ICD-10-CM

## 2021-10-18 DIAGNOSIS — Z85.3 HISTORY OF BREAST CANCER: ICD-10-CM

## 2021-10-18 DIAGNOSIS — G47.33 OSA (OBSTRUCTIVE SLEEP APNEA): ICD-10-CM

## 2021-10-18 DIAGNOSIS — J42 CHRONIC BRONCHITIS, UNSPECIFIED CHRONIC BRONCHITIS TYPE (H): Primary | ICD-10-CM

## 2021-10-18 DIAGNOSIS — G25.81 RESTLESS LEG SYNDROME: ICD-10-CM

## 2021-10-18 DIAGNOSIS — M25.50 PAIN IN JOINT, MULTIPLE SITES: ICD-10-CM

## 2021-10-18 DIAGNOSIS — E53.8 VITAMIN B12 DEFICIENCY: ICD-10-CM

## 2021-10-18 DIAGNOSIS — E87.6 HYPOKALEMIA: ICD-10-CM

## 2021-10-18 DIAGNOSIS — J42 CHRONIC BRONCHITIS, UNSPECIFIED CHRONIC BRONCHITIS TYPE (H): ICD-10-CM

## 2021-10-18 PROBLEM — R06.02 SOB (SHORTNESS OF BREATH): Status: RESOLVED | Noted: 2019-09-24 | Resolved: 2021-10-18

## 2021-10-18 PROBLEM — C50.912 MALIGNANT NEOPLASM OF LEFT FEMALE BREAST, UNSPECIFIED ESTROGEN RECEPTOR STATUS, UNSPECIFIED SITE OF BREAST (H): Status: RESOLVED | Noted: 2019-02-19 | Resolved: 2021-10-18

## 2021-10-18 PROCEDURE — 99214 OFFICE O/P EST MOD 30 MIN: CPT | Performed by: FAMILY MEDICINE

## 2021-10-18 RX ORDER — PREDNISONE 20 MG/1
20 TABLET ORAL DAILY
Qty: 7 TABLET | Refills: 0 | OUTPATIENT
Start: 2021-10-18 | End: 2021-10-25

## 2021-10-18 ASSESSMENT — MIFFLIN-ST. JEOR: SCORE: 1517.45

## 2021-10-18 NOTE — TELEPHONE ENCOUNTER
Patient calls, she reports that her medical cannabis certification recently .  She is requesting to be re-certified    Last apt with BE: 21

## 2021-10-18 NOTE — ASSESSMENT & PLAN NOTE
The patient had an A1c at 5.9 within the past year but her most recent A1c was in the normal range.
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
## 2021-10-18 NOTE — ASSESSMENT & PLAN NOTE
The patient uses Requip and has been advised to take some iron supplementation.  However, caution with this as she does have a history of diagnosed hemochromatosis.

## 2021-10-18 NOTE — ASSESSMENT & PLAN NOTE
The patient uses Trelegy on a daily basis and albuterol as needed.  She recently had an acute exacerbation of her COPD and is gradually improving from that.

## 2021-10-18 NOTE — ASSESSMENT & PLAN NOTE
The patient had significant serotonin syndrome last year after an increase in her dose of Pristiq.  She has been off of all of her psychiatric medications since that time and is trying to manage her mental health primarily through supplements.  Patient does have a psychiatrist whom she sees and also sees Dr. Dubois at the pain clinic.

## 2021-10-18 NOTE — ASSESSMENT & PLAN NOTE
Difficult to tolerate; patient is working currently to get back into routine use of this. Does struggle with insomnia

## 2021-10-18 NOTE — ASSESSMENT & PLAN NOTE
Patient has multiple orthopedic issues and chronic pain in particular her right ankle, bilateral shoulders as well as others.  She had been on chronic narcotic pain medication but when she had serotonin syndrome this past year also discontinued all of her opioids.

## 2021-10-19 ENCOUNTER — VIRTUAL VISIT (OUTPATIENT)
Dept: PSYCHOLOGY | Facility: CLINIC | Age: 66
End: 2021-10-19
Payer: MEDICARE

## 2021-10-19 DIAGNOSIS — F33.1 MAJOR DEPRESSIVE DISORDER, RECURRENT EPISODE, MODERATE WITH ANXIOUS DISTRESS (H): Primary | ICD-10-CM

## 2021-10-19 DIAGNOSIS — F41.1 GAD (GENERALIZED ANXIETY DISORDER): ICD-10-CM

## 2021-10-19 PROCEDURE — 90834 PSYTX W PT 45 MINUTES: CPT | Mod: 95 | Performed by: SOCIAL WORKER

## 2021-10-19 NOTE — PROGRESS NOTES
Progress Note    Patient Name: Randi Cleary  Date: 10/19/21         Service Type: Individual      Session Start Time: 2:02 PM  Session End Time: 2:53 PM     Session Length: 52 minutes    Session #: 60    Attendees: Client attended alone    Service Modality:  Video Visit:    Telemedicine Visit: The patient's condition can be safely assessed and treated via synchronous audio and visual telemedicine encounter.      Reason for Telemedicine Visit: Services only offered telehealth    Originating Site (Patient Location): Patient's home    Distant Site (Provider Location): Provider Remote Setting- Home Office    Consent:  The patient/guardian has verbally consented to: the potential risks and benefits of telemedicine (video visit) versus in person care; bill my insurance or make self-payment for services provided; and responsibility for payment of non-covered services.     Mode of Communication:  Video Conference via Mobile Shareholder    As the provider I attest to compliance with applicable laws and regulations related to telemedicine.      Treatment Plan Last Reviewed: 9/14/21  PHQ-9 / CHAVO-7 :   PHQ 9/14/2021 9/28/2021 10/12/2021   PHQ-9 Total Score 15 15 19   Q9: Thoughts of better off dead/self-harm past 2 weeks Not at all Not at all Several days   F/U: Thoughts of suicide or self-harm - - No   F/U: Self harm-plan - - -   F/U: Self-harm action - - -   F/U: Safety concerns - - No   Some encounter information is confidential and restricted. Go to Review Flowsheets activity to see all data.     CHAVO-7 SCORE 9/14/2021 9/28/2021 10/12/2021   Total Score 16 (severe anxiety) 18 (severe anxiety) 15 (severe anxiety)   Total Score 16 18 15   Some encounter information is confidential and restricted. Go to Review Flowsheets activity to see all data.         DATA  Extended Session (53+ minutes): No  Interactive Complexity: No  Crisis: No      Progress Since Last Session (Related to Symptoms /  Goals / Homework):   Symptoms: Worsening very frustrated     Homework: Partially completed  Focus on your health    Be kinder to others and to your own parts  Pay attention to physical sensations when having feelings    Continue to engage in behavioral activation, 10 minutes a day  Get back to the pool  Go to a meeting     Episode of Care Goals: Satisfactory progress - ACTION (Actively working towards change); Intervened by reinforcing change plan / affirming steps taken     Current / Ongoing Stressors and Concerns:   Patient is currently socially isolated. She has a conflictual relationship with her .  She is getting minimal physical activity.  She had recent eye surgery     Treatment Objective(s) Addressed in This Session:   Patient will increase frequency of engaging her in ADLs.  Patient will track and record at least 5 pleasant exchanges with . Patient will be able to identify at least 5 positive traits about her .  Patient will reduce level of depressive and anxious features as evidenced by reduction in score on her CHAVO-7 and PHQ-9 (scores of 15 and 16 at first measurment, respectively).     Intervention:   CBT: Solution Focused Therapy: Supportive Therapy: Looked at frustrations she has been experiencing. Processed the frustrations and identified next steps. Discussed the possibility of looking at her mental health being supported with medications, reviewed how she had thought her mental health would improve when her circumstances did, but that the circumstances continue to a barrier that she continues to struggle with anger, frustration, and hopelessness. Talked about upcoming appointment with provider who has prescribed mental health medications for her in the past. Also reviewed coping tools and supports she is using right now.    ASSESSMENT: Current Emotional / Mental Status (status of significant symptoms):   Risk status (Self / Other harm or suicidal ideation)   Patient denies  current fears or concerns for personal safety.   Patient denies current or recent suicidal ideation or behaviors.   Patient denies current or recent homicidal ideation or behaviors.   Patient denies current or recent self injurious behavior or ideation.   Patient denies other safety concerns.   Patient reports there has been no change in risk factors since their last session.     Patient reports there has been no change in protective factors since their last session.     A safety and risk management plan has been developed including: Patient consented to co-developed safety plan.  Safety and risk management plan was completed.  Patient agreed to use safety plan should any safety concerns arise.  A copy was given to the patient. This was done on 11/24/2020 and is attached to the bottom of the note.     Appearance:   Appropriate    Eye Contact:   Fair    Psychomotor Behavior: Normal    Attitude:   Cooperative    Orientation:   All   Speech    Rate / Production: Normal/ Responsive    Volume:  Normal    Mood:    Sad  Frustrated   Affect:    Worrisome    Thought Content:  Clear    Thought Form:  Coherent    Insight:    Fair      Medication Review:   No changes to current psychiatric medication(s)     Medication Compliance:   Yes     Changes in Health Issues:   None reported     Chemical Use Review:   Substance Use: decrease in use.  Patient reports frequency of use none since the third week of August.  Provided encouragement towards sobriety        Tobacco Use: No current tobacco use.      Diagnosis:  1. Major depressive disorder, recurrent episode, moderate with anxious distress (H)    2. CHAVO (generalized anxiety disorder)    R/O bipolar disorder  Collateral Reports Completed:   Not Applicable    PLAN: (Patient Tasks / Therapist Tasks / Other)  Focus on your health    Be kinder to others and to your own parts  Pay attention to physical sensations when having feelings    Continue to engage in behavioral activation, 10  "minutes a day  Get back to the pool  Go to a meeting    Continue CHUCK ARROYO MICAELA BAKER JO    2021                                                         ______________________________________________________________________    Treatment Plan    Patient's Name: Randi Cleary  YOB: 1955    Date: 21    DSM5 Diagnoses: 296.32 (F33.1) Major Depressive Disorder, Recurrent Episode, Moderate With anxious distress  Psychosocial / Contextual Factors: Patient's entire family of origin has , she now has a sister-in-law and  as support.  Relationship with  is conflictual.  Patient is reporting increase in physical symptoms due to COVID-19.  Patient is off of pain medications.  WHODAS: 42    Referral / Collaboration:  Referral to another professional/service is not indicated at this time.     Anticipated number of session or this episode of care: 12-15      MeasurableTreatment Goal(s) related to diagnosis / functional impairment(s)  Goal 1: Patient will \"jumpstart, getting going with the things I need to be doing around the house as far as picking up, doing things, trying to do something every day.  Also to lessen the animosity between me and my .\"    I will know I've met my goal when my shoulders are fixed and I can see.      Objective #A (Patient Action)    Patient will increase frequency of engaging her in ADLs.  Status: Continued - Date(s): 21    Intervention(s)  Therapist will engage patient in CBT, specifically behavioral activation.    Objective #B  Patient will track and record at least 5 pleasant exchanges with . Patient will be able to identify at least 5 positive traits about her  and how he relates to her.  Status: Continued - Date(s): 21    Intervention(s)  Therapist will teach assertiveness skills and assign homework related to relationship interactions.    Objective #C  Patient will reduce level of depressive and anxious " "features as evidenced by reduction in score on her CHAVO-7 and PHQ-9 (scores of 15 and 16 at first measurment, respectively).  Status: Continued - Date(s):  9/14/21    Intervention(s)  Therapist will engage patient in person-centered therapy and CBT.    Patient has reviewed and agreed to the above plan.      CRUZ BAKER COLBY  September 14, 2021                                                Randi Cleary          SAFETY PLAN:  Step 1: Warning signs / cues (Thoughts, images, mood, situation, behavior) that a crisis may be developing:  ? Thoughts: \"I don't want to continue\" \"I am unwanted\"  ? Images: none  ? Thinking Processes: ruminating  ? Mood: anger  ? Behaviors: isolating/withdrawing , can't stop crying, not taking care of myself and not taking care of my responsibilities  ? Situations: small triggers, such as not being able to find something, or dropping something   Step 2: Coping strategies - Things I can do to take my mind off of my problems without contacting another person (relaxation technique, physical activity):  ? Distress Tolerance Strategies:  arts and crafts: drawing, play with my pet , listen to positive and upbeat music: any, change body temperature (ice pack/cold water)  and paced breathing/progressive muscle relaxation  ? Physical Activities: go for a walk, deep breathing and stretching   ? Focus on helpful thoughts:  \"You've been through this before, you can get through it again.\"  Step 3: People and social settings that provide distraction:                 Name: Carmen                            Name: Darien                           Name: Aelida       ? pool, shopping, Carmen's house, [employer]       Step 4: Remind myself of people and things that are important to me and worth living for:  Clifford Little Donna, post-COVID world, options of what could be in your future        Step 5: When I am in crisis, I can ask these people to help me use my safety plan:                 Name: Sidney  Step 6: " Making the environment safe:   ? go to sleep/daydream  Step 7: Professionals or agencies I can contact during a crisis:  ? [facility] Daytime Number: [phone]  ? Suicide Prevention Lifeline: 1-388-066-CDRV (4004)  ? Crisis Text Line Service (available 24 hours a day, 7 days a week): Text [place] to 253096    Local Crisis Services: [facility] Crisis: [phone]     Call 911 or go to my nearest emergency department.       I helped develop this safety plan and agree to use it when needed.  I have been given a copy of this plan.       Client signature _________________________________________________________________  Today s date:  11/24/2020  Adapted from Safety Plan Template 2008 Randi Poole and Robby Barba is reprinted with the express permission of the authors.  No portion of the Safety Plan Template may be reproduced without the express, written permission.  You can contact the authors at [email or web address] or [email or web address].

## 2021-10-21 ENCOUNTER — VIRTUAL VISIT (OUTPATIENT)
Dept: PALLIATIVE MEDICINE | Facility: OTHER | Age: 66
End: 2021-10-21
Payer: MEDICARE

## 2021-10-21 VITALS — BODY MASS INDEX: 35.03 KG/M2 | HEIGHT: 66 IN | WEIGHT: 218 LBS

## 2021-10-21 DIAGNOSIS — F31.81 BIPOLAR 2 DISORDER (H): Primary | ICD-10-CM

## 2021-10-21 PROCEDURE — G0463 HOSPITAL OUTPT CLINIC VISIT: HCPCS | Mod: PN,RTG | Performed by: ANESTHESIOLOGY

## 2021-10-21 PROCEDURE — 99214 OFFICE O/P EST MOD 30 MIN: CPT | Mod: 95 | Performed by: ANESTHESIOLOGY

## 2021-10-21 RX ORDER — OXCARBAZEPINE 150 MG/1
TABLET, FILM COATED ORAL
Qty: 30 TABLET | Refills: 3 | Status: SHIPPED | OUTPATIENT
Start: 2021-10-21 | End: 2022-05-26

## 2021-10-21 RX ORDER — CYANOCOBALAMIN (VITAMIN B-12) 500 MCG
800 LOZENGE ORAL DAILY
COMMUNITY
End: 2023-11-01

## 2021-10-21 ASSESSMENT — PAIN SCALES - GENERAL: PAINLEVEL: EXTREME PAIN (9)

## 2021-10-21 ASSESSMENT — MIFFLIN-ST. JEOR: SCORE: 1535.59

## 2021-10-21 NOTE — PATIENT INSTRUCTIONS
PLAN:  Trileptal (oxcarbazepine 150 mg 1/2 tablet at night for 3 nights, one half twice a day for 5 days, if still mood instability may increase to 1 tablet twice a day.    You may resume the lithium orotate 10 mg every third night.    You'll be reenrolled in the [place] medical cannabis program.    We'll try to schedule appointment for a video visit before your scheduled surgery next month

## 2021-10-21 NOTE — LETTER
"    10/21/2021         RE: Randi Cleary  [address] [facility] [address] [address]        Dear Colleague,    Thank you for referring your patient, Randi Cleary, to the [facility]. Please see a copy of my visit note below.    Winnie is a 66 year old who is being evaluated via a billable video visit.      How would you like to obtain your AVS? MyChart  If the video visit is dropped, the invitation should be resent by: Text to cell phone: [phone]  Will anyone else be joining your video visit? No  Patient is here for a follow up appointment with Dr. Dusty Dubois.   Pain score: 9  Constant or intermittent:   What does your pain feel like: constant  Does the pain interfere with: dull, tingling, radiate and \"the pain is affecting my balance\"  Work: yes  Walking/distance: yes  Sleep: yes  Daily activities: yes  Relationships/social life: no  Mood: yes  F= 8  Completed online renewal for the Medical Cannabis program, patient is aware.    Pamela Ayers LPN    Winnie is a 66 year old who is being evaluated via a billable video visit.      How would you like to obtain your AVS?   If the video visit is dropped, the invitation should be resent by:   Will anyone else be joining your video visit?   Video Start Time:         Subjective   Winnie is a 66 year old who presents for the following health issues     Follow-up for a mood disorder.    She reviews since last seen not been doing well. She has been very angry and irritable. She describes sometimes she is \"high\" and her  makes comments. There is stress. She feels like she is on the point of \"losing it\".    She she had been using lithium orotate to get every third night when we spoke earlier in the summer not sure why she did not continue.    She continues to work with her psychotherapist.    She wants to be reviewed in the medical cannabis. She thinks sometimes it is helpful, other x1 if it made worse.    She is also adjusting how she " takes the Requip, sometimes wonders if it makes things worse.    She did see the oncologist is on iron replacement.    She has been trying to get scheduled a preop and tentatively has cervical surgery in mid November.    Describes having a bad reaction to the Covid booster.    Reviewing the record last spring we talked about oxcarbazepine as a different medication for mood stabilization. She had read about the side effects. She has been worried about serotonin syndrome, reviewed this medication is not primarily serotonergic      Current Outpatient Medications:      albuterol (PROVENTIL) (2.5 MG/3ML) 0.083% neb solution, Take 1 vial (2.5 mg) by nebulization every 4 hours as needed for shortness of breath / dyspnea or wheezing, Disp: 360 mL, Rfl: 5     Cholecalciferol (VITAMIN D3) 250 MCG (98530 UT) TABS, Take 1 tablet by mouth daily 77050/day, Disp: , Rfl:      Cyanocobalamin (VITAMIN B-12) 5000 MCG SUBL, Unknown dose. 2 or 3 sprays/day, Disp: , Rfl:      ethacrynic acid (EDECRIN) 25 MG tablet, Take 1 tablet by mouth on Saturday and Wednesday, Disp: 30 tablet, Rfl: 11     Fluticasone-Umeclidin-Vilanterol (TRELEGY ELLIPTA) 200-62.5-25 MCG/INH oral inhaler, Inhale 1 puff into the lungs daily, Disp: 1 each, Rfl: 11     losartan (COZAAR) 25 MG tablet, Take 1 tablet (25 mg) by mouth daily, Disp: 90 tablet, Rfl: 2     magnesium 250 MG tablet, Take 1 tablet by mouth 2 times daily, Disp: , Rfl:      methocarbamol (ROBAXIN) 500 MG tablet, TAKE 1 TABLET BY MOUTH AT BEDTIME AS NEEDED (Patient taking differently: Take 500 mg by mouth TAKE 1 to 3 TABLET BY MOUTH AT BEDTIME AS NEEDED), Disp: 40 tablet, Rfl: 0     Multiple Vitamins-Iron (MULTIVITAMIN PLUS IRON ADULT) TABS, Take 4 tablets by mouth daily, Disp: 120 tablet, Rfl: 0     omeprazole (PRILOSEC) 20 MG DR capsule, daily, Disp: , Rfl:      OXcarbazepine (TRILEPTAL) 150 MG tablet, One-half tab bedtime 3 nights, one-half tab twice a day 5 days, one twice a day, Disp: 30 tablet,  Rfl: 3     potassium chloride ER (KLOR-CON M) 20 MEQ CR tablet, Take 1 tablet (20 mEq) by mouth daily, Disp: 90 tablet, Rfl: 3     rOPINIRole (REQUIP) 2 MG tablet, , Disp: , Rfl:      SYNTHROID 150 MCG tablet, Take 1 tablet (150 mcg) by mouth daily, Disp: 90 tablet, Rfl: 4     Turmeric Curcumin 500 MG CAPS, , Disp: , Rfl:      vitamin (B COMPLEX-C) tablet, Take 1 tablet by mouth daily, Disp: , Rfl:      vitamin E 400 units TABS, Take 800 Units by mouth daily, Disp: , Rfl:      azithromycin (ZITHROMAX) 500 MG tablet, Take 1 tablet (500 mg) by mouth daily, Disp: 7 tablet, Rfl: 0     guaiFENesin-codeine (ROBITUSSIN AC) 100-10 MG/5ML solution, Take 5-10 mLs by mouth every 6 hours as needed for cough (Patient not taking: Reported on 10/21/2021), Disp: 236 mL, Rfl: 0     medical cannabis (Patient's own supply), See Admin Instructions (The purpose of this order is to document that the patient reports taking medical cannabis.  This is not a prescription, and is not used to certify that the patient has a qualifying medical condition.)  (Patient not taking: Reported on 10/21/2021), Disp: , Rfl:      PREBIOTIC PRODUCT PO, Take by mouth daily (Patient not taking: Reported on 10/21/2021), Disp: , Rfl:     Video alert, clear sensorium. Thought process logical coherent is normal rate and rhythm and is not pressured. She appears more dysphoric.    HPI         Review of Systems         Objective    Vitals - Patient Reported  Pain Score: Extreme Pain (9)  Pain Loc: Neck (neck, shoulders and focus on mental health)        Physical Exam       Assessment patient has a significant challenges with mood lability, has been on antidepressants with serotonin syndrome. We have tried lithium orotate products with some benefit though not using consistently.    Have a trial of oxcarbazepine, reviewed its use as a mood stabilizer. She would like to start at low doses and she will start with 150 mg 1/2 tablet at bedtime advancing slowly.    She  will call if there are questions be seen for an earlier appointment.    She continues with her psychotherapist.    Total time more than 25 minutes            Video-Visit Details    Type of service:  Video Visit    Video End Time:    Originating Location (pt. Location): home    Distant Location (provider location):  [facility]     Platform used for Video Visit: doximety      Again, thank you for allowing me to participate in the care of your patient.        Sincerely,        AXEL WIGGINS MD

## 2021-10-21 NOTE — PROGRESS NOTES
"Winnie is a 66 year old who is being evaluated via a billable video visit.      How would you like to obtain your AVS?   If the video visit is dropped, the invitation should be resent by:   Will anyone else be joining your video visit?   Video Start Time:         Subjective   Winnie is a 66 year old who presents for the following health issues     Follow-up for a mood disorder.    She reviews since last seen not been doing well. She has been very angry and irritable. She describes sometimes she is \"high\" and her  makes comments. There is stress. She feels like she is on the point of \"losing it\".    She she had been using lithium orotate to get every third night when we spoke earlier in the summer not sure why she did not continue.    She continues to work with her psychotherapist.    She wants to be reviewed in the medical cannabis. She thinks sometimes it is helpful, other x1 if it made worse.    She is also adjusting how she takes the Requip, sometimes wonders if it makes things worse.    She did see the oncologist is on iron replacement.    She has been trying to get scheduled a preop and tentatively has cervical surgery in mid November.    Describes having a bad reaction to the Covid booster.    Reviewing the record last spring we talked about oxcarbazepine as a different medication for mood stabilization. She had read about the side effects. She has been worried about serotonin syndrome, reviewed this medication is not primarily serotonergic      Current Outpatient Medications:      albuterol (PROVENTIL) (2.5 MG/3ML) 0.083% neb solution, Take 1 vial (2.5 mg) by nebulization every 4 hours as needed for shortness of breath / dyspnea or wheezing, Disp: 360 mL, Rfl: 5     Cholecalciferol (VITAMIN D3) 250 MCG (26669 UT) TABS, Take 1 tablet by mouth daily 65971/day, Disp: , Rfl:      Cyanocobalamin (VITAMIN B-12) 5000 MCG SUBL, Unknown dose. 2 or 3 sprays/day, Disp: , Rfl:      ethacrynic acid (EDECRIN) 25 MG " tablet, Take 1 tablet by mouth on Saturday and Wednesday, Disp: 30 tablet, Rfl: 11     Fluticasone-Umeclidin-Vilanterol (TRELEGY ELLIPTA) 200-62.5-25 MCG/INH oral inhaler, Inhale 1 puff into the lungs daily, Disp: 1 each, Rfl: 11     losartan (COZAAR) 25 MG tablet, Take 1 tablet (25 mg) by mouth daily, Disp: 90 tablet, Rfl: 2     magnesium 250 MG tablet, Take 1 tablet by mouth 2 times daily, Disp: , Rfl:      methocarbamol (ROBAXIN) 500 MG tablet, TAKE 1 TABLET BY MOUTH AT BEDTIME AS NEEDED (Patient taking differently: Take 500 mg by mouth TAKE 1 to 3 TABLET BY MOUTH AT BEDTIME AS NEEDED), Disp: 40 tablet, Rfl: 0     Multiple Vitamins-Iron (MULTIVITAMIN PLUS IRON ADULT) TABS, Take 4 tablets by mouth daily, Disp: 120 tablet, Rfl: 0     omeprazole (PRILOSEC) 20 MG DR capsule, daily, Disp: , Rfl:      OXcarbazepine (TRILEPTAL) 150 MG tablet, One-half tab bedtime 3 nights, one-half tab twice a day 5 days, one twice a day, Disp: 30 tablet, Rfl: 3     potassium chloride ER (KLOR-CON M) 20 MEQ CR tablet, Take 1 tablet (20 mEq) by mouth daily, Disp: 90 tablet, Rfl: 3     rOPINIRole (REQUIP) 2 MG tablet, , Disp: , Rfl:      SYNTHROID 150 MCG tablet, Take 1 tablet (150 mcg) by mouth daily, Disp: 90 tablet, Rfl: 4     Turmeric Curcumin 500 MG CAPS, , Disp: , Rfl:      vitamin (B COMPLEX-C) tablet, Take 1 tablet by mouth daily, Disp: , Rfl:      vitamin E 400 units TABS, Take 800 Units by mouth daily, Disp: , Rfl:      azithromycin (ZITHROMAX) 500 MG tablet, Take 1 tablet (500 mg) by mouth daily, Disp: 7 tablet, Rfl: 0     guaiFENesin-codeine (ROBITUSSIN AC) 100-10 MG/5ML solution, Take 5-10 mLs by mouth every 6 hours as needed for cough (Patient not taking: Reported on 10/21/2021), Disp: 236 mL, Rfl: 0     medical cannabis (Patient's own supply), See Admin Instructions (The purpose of this order is to document that the patient reports taking medical cannabis.  This is not a prescription, and is not used to certify that the  patient has a qualifying medical condition.)  (Patient not taking: Reported on 10/21/2021), Disp: , Rfl:      PREBIOTIC PRODUCT PO, Take by mouth daily (Patient not taking: Reported on 10/21/2021), Disp: , Rfl:     Video alert, clear sensorium. Thought process logical coherent is normal rate and rhythm and is not pressured. She appears more dysphoric.    HPI         Review of Systems         Objective    Vitals - Patient Reported  Pain Score: Extreme Pain (9)  Pain Loc: Neck (neck, shoulders and focus on mental health)        Physical Exam       Assessment patient has a significant challenges with mood lability, has been on antidepressants with serotonin syndrome. We have tried lithium orotate products with some benefit though not using consistently.    Have a trial of oxcarbazepine, reviewed its use as a mood stabilizer. She would like to start at low doses and she will start with 150 mg 1/2 tablet at bedtime advancing slowly.    She will call if there are questions be seen for an earlier appointment.    She continues with her psychotherapist.    Total time more than 25 minutes            Video-Visit Details    Type of service:  Video Visit    Video End Time:    Originating Location (pt. Location): home    Distant Location (provider location):  [facility]     Platform used for Video Visit: Aditive

## 2021-10-21 NOTE — PROGRESS NOTES
"Winnie is a 66 year old who is being evaluated via a billable video visit.      How would you like to obtain your AVS? MyChart  If the video visit is dropped, the invitation should be resent by: Text to cell phone: [phone]  Will anyone else be joining your video visit? No  Patient is here for a follow up appointment with Dr. Dusty Dubois.   Pain score: 9  Constant or intermittent:   What does your pain feel like: constant  Does the pain interfere with: dull, tingling, radiate and \"the pain is affecting my balance\"  Work: yes  Walking/distance: yes  Sleep: yes  Daily activities: yes  Relationships/social life: no  Mood: yes  F= 8  Completed online renewal for the Medical Cannabis program, patient is aware.    Pamela Ayers LPN  "

## 2021-10-22 ENCOUNTER — DOCUMENTATION ONLY (OUTPATIENT)
Dept: SLEEP MEDICINE | Facility: CLINIC | Age: 66
End: 2021-10-22

## 2021-10-22 NOTE — PROGRESS NOTES
STM recheck per provider    Message left for patient to return call     Assessment: Pt not meeting objective benchmarks for compliance .  Device last called in 4/22/21.  Patient reported to provider she wanted to restart therapy.      Action plan: 2 week STM recheck appt scheduled           PAP settings: CPAP min 7.0 cm  H20       CPAP max 11.0 cm  H20      CPAP fixed 11.0 cm  H20           RESMED EPR level Setting: THREE    RESMED Soft response setting:  ON    Mask type:  Nasal Mask

## 2021-10-25 NOTE — TELEPHONE ENCOUNTER
Completed online renewal for the Medical Cannabis program on 10/21/2021, patient is aware.  Pamela Ayers LPN

## 2021-10-26 ENCOUNTER — PATIENT OUTREACH (OUTPATIENT)
Dept: NURSING | Facility: CLINIC | Age: 66
End: 2021-10-26

## 2021-10-26 ENCOUNTER — VIRTUAL VISIT (OUTPATIENT)
Dept: PSYCHOLOGY | Facility: CLINIC | Age: 66
End: 2021-10-26
Payer: MEDICARE

## 2021-10-26 DIAGNOSIS — F33.1 MAJOR DEPRESSIVE DISORDER, RECURRENT EPISODE, MODERATE WITH ANXIOUS DISTRESS (H): Primary | ICD-10-CM

## 2021-10-26 DIAGNOSIS — F41.1 GAD (GENERALIZED ANXIETY DISORDER): ICD-10-CM

## 2021-10-26 PROCEDURE — 90834 PSYTX W PT 45 MINUTES: CPT | Mod: 95 | Performed by: SOCIAL WORKER

## 2021-10-26 ASSESSMENT — ANXIETY QUESTIONNAIRES
1. FEELING NERVOUS, ANXIOUS, OR ON EDGE: NEARLY EVERY DAY
7. FEELING AFRAID AS IF SOMETHING AWFUL MIGHT HAPPEN: SEVERAL DAYS
2. NOT BEING ABLE TO STOP OR CONTROL WORRYING: SEVERAL DAYS
6. BECOMING EASILY ANNOYED OR IRRITABLE: MORE THAN HALF THE DAYS
7. FEELING AFRAID AS IF SOMETHING AWFUL MIGHT HAPPEN: SEVERAL DAYS
4. TROUBLE RELAXING: NEARLY EVERY DAY
3. WORRYING TOO MUCH ABOUT DIFFERENT THINGS: SEVERAL DAYS
GAD7 TOTAL SCORE: 12
8. IF YOU CHECKED OFF ANY PROBLEMS, HOW DIFFICULT HAVE THESE MADE IT FOR YOU TO DO YOUR WORK, TAKE CARE OF THINGS AT HOME, OR GET ALONG WITH OTHER PEOPLE?: VERY DIFFICULT
GAD7 TOTAL SCORE: 12
GAD7 TOTAL SCORE: 12
5. BEING SO RESTLESS THAT IT IS HARD TO SIT STILL: SEVERAL DAYS

## 2021-10-26 NOTE — PROGRESS NOTES
Progress Note    Patient Name: Randi Cleary  Date: 10/26/21         Service Type: Individual      Session Start Time: 3:00 PM  Session End Time: 3:51 PM     Session Length: 51 minutes     Session #: 61    Attendees: Client attended alone    Service Modality:  Video Visit:    Telemedicine Visit: The patient's condition can be safely assessed and treated via synchronous audio and visual telemedicine encounter.      Reason for Telemedicine Visit: Services only offered telehealth    Originating Site (Patient Location): Patient's home    Distant Site (Provider Location): Provider Remote Setting- Home Office    Consent:  The patient/guardian has verbally consented to: the potential risks and benefits of telemedicine (video visit) versus in person care; bill my insurance or make self-payment for services provided; and responsibility for payment of non-covered services.     Mode of Communication:  Video Conference via Secured Mail    As the provider I attest to compliance with applicable laws and regulations related to telemedicine.      Treatment Plan Last Reviewed: 9/14/21  PHQ-9 / CHAVO-7 :   PHQ 9/28/2021 10/12/2021 10/26/2021   PHQ-9 Total Score 15 19 13   Q9: Thoughts of better off dead/self-harm past 2 weeks Not at all Several days Not at all   F/U: Thoughts of suicide or self-harm - No -   F/U: Self harm-plan - - -   F/U: Self-harm action - - -   F/U: Safety concerns - No -   Some encounter information is confidential and restricted. Go to Review Flowsheets activity to see all data.     CHAVO-7 SCORE 9/28/2021 10/12/2021 10/26/2021   Total Score 18 (severe anxiety) 15 (severe anxiety) 12 (moderate anxiety)   Total Score 18 15 12   Some encounter information is confidential and restricted. Go to Review Flowsheets activity to see all data.         DATA  Extended Session (53+ minutes): No  Interactive Complexity: No  Crisis: No      Progress Since Last Session (Related to Symptoms  / Goals / Homework):   Symptoms: more hopeful, though carrying a lot of stress     Homework: Partially completed  Focus on your health -done    Be kinder to others and to your own parts  Pay attention to physical sensations when having feelings    Continue to engage in behavioral activation, 10 minutes a day  Get back to the pool  Go to a meeting     Episode of Care Goals: Satisfactory progress - ACTION (Actively working towards change); Intervened by reinforcing change plan / affirming steps taken     Current / Ongoing Stressors and Concerns:   Patient is currently socially isolated. She has a conflictual relationship with her .  She is getting minimal physical activity.  She had recent eye surgery     Treatment Objective(s) Addressed in This Session:   Patient will increase frequency of engaging her in ADLs.  Patient will track and record at least 5 pleasant exchanges with . Patient will be able to identify at least 5 positive traits about her .  Patient will reduce level of depressive and anxious features as evidenced by reduction in score on her CHAVO-7 and PHQ-9 (scores of 15 and 16 at first measurment, respectively).     Intervention:   Supportive Therapy: Processed positive changes in her life. Also talked about having started a mood stabilizer. Discussed upcoming surgery and how this is causing her stress. Talked about tasks she needs to complete. Looked at what type of support she needed; she reported knowing she needs to look at her past and wanting to engage in IFS, but not feeling ready just yet for this.    ASSESSMENT: Current Emotional / Mental Status (status of significant symptoms):   Risk status (Self / Other harm or suicidal ideation)   Patient denies current fears or concerns for personal safety.   Patient denies current or recent suicidal ideation or behaviors.   Patient denies current or recent homicidal ideation or behaviors.   Patient denies current or recent self injurious  behavior or ideation.   Patient denies other safety concerns.   Patient reports there has been no change in risk factors since their last session.     Patient reports there has been no change in protective factors since their last session.     A safety and risk management plan has been developed including: Patient consented to co-developed safety plan.  Safety and risk management plan was completed.  Patient agreed to use safety plan should any safety concerns arise.  A copy was given to the patient. This was done on 11/24/2020 and is attached to the bottom of the note.     Appearance:   Appropriate    Eye Contact:   Fair    Psychomotor Behavior: Normal    Attitude:   Cooperative    Orientation:   All   Speech    Rate / Production: Normal/ Responsive    Volume:  Normal    Mood:    hoepful, yet frustrated   Affect:    Appropriate    Thought Content:  Clear    Thought Form:  Coherent    Insight:    Fair      Medication Review:   Changes to psychiatric medications, see updated Medication List in EPIC.  Patient has started a mood stabilizer, oxcarbazepine      Medication Compliance:   Yes     Changes in Health Issues:   None reported     Chemical Use Review:   Substance Use: decrease in use.  Patient reports frequency of use none since the third week of August.  Provided encouragement towards sobriety        Tobacco Use: No current tobacco use.      Diagnosis:  1. Major depressive disorder, recurrent episode, moderate with anxious distress (H)    2. CHAVO (generalized anxiety disorder)    R/O bipolar disorder    Collateral Reports Completed:   Not Applicable    PLAN: (Patient Tasks / Therapist Tasks / Other)  Be kinder to others and to your own parts  Pay attention to physical sensations when having feelings    Continue to engage in behavioral activation, 10 minutes a day  Get back to the pool  Go to a meeting    MICALEA Johnson    October 26, 2021                                                      "    ______________________________________________________________________    Treatment Plan    Patient's Name: Randi Cleary  YOB: 1955    Date: 21    DSM5 Diagnoses: 296.32 (F33.1) Major Depressive Disorder, Recurrent Episode, Moderate With anxious distress  Psychosocial / Contextual Factors: Patient's entire family of origin has , she now has a sister-in-law and  as support.  Relationship with  is conflictual.  Patient is reporting increase in physical symptoms due to COVID-19.  Patient is off of pain medications.  WHODAS: 42    Referral / Collaboration:  Referral to another professional/service is not indicated at this time.     Anticipated number of session or this episode of care: 12-15      MeasurableTreatment Goal(s) related to diagnosis / functional impairment(s)  Goal 1: Patient will \"jumpstart, getting going with the things I need to be doing around the house as far as picking up, doing things, trying to do something every day.  Also to lessen the animosity between me and my .\"    I will know I've met my goal when my shoulders are fixed and I can see.      Objective #A (Patient Action)    Patient will increase frequency of engaging her in ADLs.  Status: Continued - Date(s): 21    Intervention(s)  Therapist will engage patient in CBT, specifically behavioral activation.    Objective #B  Patient will track and record at least 5 pleasant exchanges with . Patient will be able to identify at least 5 positive traits about her  and how he relates to her.  Status: Continued - Date(s): 21    Intervention(s)  Therapist will teach assertiveness skills and assign homework related to relationship interactions.    Objective #C  Patient will reduce level of depressive and anxious features as evidenced by reduction in score on her CHAVO-7 and PHQ-9 (scores of 15 and 16 at first measurment, respectively).  Status: Continued - Date(s):  " "9/14/21    Intervention(s)  Therapist will engage patient in person-centered therapy and CBT.    Patient has reviewed and agreed to the above plan.      MICAELA SLADE  September 14, 2021                                                Randi Cleary          SAFETY PLAN:  Step 1: Warning signs / cues (Thoughts, images, mood, situation, behavior) that a crisis may be developing:  ? Thoughts: \"I don't want to continue\" \"I am unwanted\"  ? Images: none  ? Thinking Processes: ruminating  ? Mood: anger  ? Behaviors: isolating/withdrawing , can't stop crying, not taking care of myself and not taking care of my responsibilities  ? Situations: small triggers, such as not being able to find something, or dropping something   Step 2: Coping strategies - Things I can do to take my mind off of my problems without contacting another person (relaxation technique, physical activity):  ? Distress Tolerance Strategies:  arts and crafts: drawing, play with my pet , listen to positive and upbeat music: any, change body temperature (ice pack/cold water)  and paced breathing/progressive muscle relaxation  ? Physical Activities: go for a walk, deep breathing and stretching   ? Focus on helpful thoughts:  \"You've been through this before, you can get through it again.\"  Step 3: People and social settings that provide distraction:                 Name: Carmen                            Name: Darien                           Name: Aleida       ? pool, shopping, Carmen's house, [employer]       Step 4: Remind myself of people and things that are important to me and worth living for:  Sidney, Aleida Horton, post-COVID world, options of what could be in your future        Step 5: When I am in crisis, I can ask these people to help me use my safety plan:                 Name: Sidney  Step 6: Making the environment safe:   ? go to sleep/daydream  Step 7: Professionals or agencies I can contact during a crisis:  ? [facility] Daytime " Number: [phone]  ? Suicide Prevention Lifeline: 5-810-420-TALK (6387)  ? Crisis Text Line Service (available 24 hours a day, 7 days a week): Text [place] to 029462    Local Crisis Services: [facility] Crisis: [phone]     Call 911 or go to my nearest emergency department.       I helped develop this safety plan and agree to use it when needed.  I have been given a copy of this plan.       Client signature _________________________________________________________________  Today s date:  11/24/2020  Adapted from Safety Plan Template 2008 Randi Poole and Robby Barba is reprinted with the express permission of the authors.  No portion of the Safety Plan Template may be reproduced without the express, written permission.  You can contact the authors at [email or web address] or [email or web address].

## 2021-10-26 NOTE — PROGRESS NOTES
Clinic Care Coordination Contact    Follow Up Progress Note      Assessment: CCC RN spoke with patient today to follow up on goals and discuss and needs and concerns. Patient said she has switched to the [facility]. She stated she found it too hard to communicate with the [facility] and felt her PCP was not available enough for her needs.   Patient said her mental health has been up and down. She said has had a follow up with her psychiatric provider las week. She was started on oxcarbazepine for mood stabilization. Patient continues to work with her therapist Corine on a regular basis and feels this continues to been beneficial.  Patient is aware she will be getting a new CCC team at the [facility] and is accepting the change.       Care Gaps:    Health Maintenance Due   Topic Date Due     HF ACTION PLAN  Never done     MICROALBUMIN  Never done     DIABETIC FOOT EXAM  Never done     ADVANCE CARE PLANNING  Never done     COPD ACTION PLAN  Never done     EYE EXAM  Never done     ZOSTER IMMUNIZATION (1 of 2) Never done     DTAP/TDAP/TD IMMUNIZATION (2 - Tdap) 05/21/2018     MEDICARE ANNUAL WELLNESS VISIT  Never done     A1C  08/14/2021       Scheduled With PCP on 11/1/21      Goals addressed this encounter:   Goals Addressed                    This Visit's Progress       Mental Health Management (pt-stated)         Goal Statement: I would like to have my depression under better control in the next 6 months.   Date Goal set: 12/29/20   Barriers: Long history of depression   Strengths: Strong advocate for herself   Date to Achieve By: 5/29/21   Patient expressed understanding of goal: Yes   Action steps to achieve this goal:   1. I will continue to attend all scheduled appointments with Dr. Dubois and my PCP and will follow recommendations.   2. I will take my medications ordered by Dr. Dubois as prescribed.   3. I will continue to attend all scheduled appointments with my  therapist, Corine.   4. I have returned to swimming at the [employer] [place] as I know this helps me both physically and mentally.   5. I will continue to participate in activities that I enjoy and keeps me busy at home.   6. I will continue to report progress towards this goals at scheduled outreach telephone calls from the CCC team.     1/27/2021- Updated and discussed   2/8/21 - Updated and discussed.   3/12/21 - Discussed   4/13= discussed   6/10/21 - Discussed and Updated  Discussed 9/9/21, 9-14  10/14- not discussed  Discussed on 10/26/21            Intervention/Education provided during outreach: Discussed the importance of taking her medications as directed. Encouraged her to attend all scheduled follow up appointments.           Plan: CCC RN will provide warm hand off to the CCC team at the [facility].     Care Coordinator will follow up in 45 days.

## 2021-10-27 ENCOUNTER — VIRTUAL VISIT (OUTPATIENT)
Dept: PSYCHOLOGY | Facility: CLINIC | Age: 66
End: 2021-10-27
Payer: MEDICARE

## 2021-10-27 DIAGNOSIS — F41.1 GAD (GENERALIZED ANXIETY DISORDER): ICD-10-CM

## 2021-10-27 DIAGNOSIS — F33.1 MAJOR DEPRESSIVE DISORDER, RECURRENT EPISODE, MODERATE WITH ANXIOUS DISTRESS (H): Primary | ICD-10-CM

## 2021-10-27 PROCEDURE — 90837 PSYTX W PT 60 MINUTES: CPT | Mod: 95 | Performed by: SOCIAL WORKER

## 2021-10-27 ASSESSMENT — PATIENT HEALTH QUESTIONNAIRE - PHQ9: SUM OF ALL RESPONSES TO PHQ QUESTIONS 1-9: 13

## 2021-10-27 ASSESSMENT — ANXIETY QUESTIONNAIRES: GAD7 TOTAL SCORE: 12

## 2021-10-27 NOTE — PROGRESS NOTES
Progress Note    Patient Name: Randi Cleary  Date: 10/27/21         Service Type: Individual      Session Start Time: 4:00 PM  Session End Time: 4:57 PM     Session Length: 57 minutes     Session #: 62    Attendees: Client attended alone    Service Modality:  Video Visit:    Telemedicine Visit: The patient's condition can be safely assessed and treated via synchronous audio and visual telemedicine encounter.      Reason for Telemedicine Visit: Services only offered telehealth    Originating Site (Patient Location): Patient's home    Distant Site (Provider Location): Provider Remote Setting- Home Office    Consent:  The patient/guardian has verbally consented to: the potential risks and benefits of telemedicine (video visit) versus in person care; bill my insurance or make self-payment for services provided; and responsibility for payment of non-covered services.     Mode of Communication:  Video Conference via [employer]    As the provider I attest to compliance with applicable laws and regulations related to telemedicine.      Treatment Plan Last Reviewed: 9/14/21  PHQ-9 / CHAVO-7 :   PHQ 9/28/2021 10/12/2021 10/26/2021   PHQ-9 Total Score 15 19 13   Q9: Thoughts of better off dead/self-harm past 2 weeks Not at all Several days Not at all   F/U: Thoughts of suicide or self-harm - No -   F/U: Self harm-plan - - -   F/U: Self-harm action - - -   F/U: Safety concerns - No -   Some encounter information is confidential and restricted. Go to Review Flowsheets activity to see all data.     CHAVO-7 SCORE 9/28/2021 10/12/2021 10/26/2021   Total Score 18 (severe anxiety) 15 (severe anxiety) 12 (moderate anxiety)   Total Score 18 15 12   Some encounter information is confidential and restricted. Go to Review Flowsheets activity to see all data.         DATA  Extended Session (53+ minutes): No  Interactive Complexity: No  Crisis: No      Progress Since Last Session (Related to Symptoms  / Goals / Homework):   Symptoms: reports feeling really tired, still struggling with her mood     Homework: Partially completed  Be kinder to others and to your own parts  Pay attention to physical sensations when having feelings    Continue to engage in behavioral activation, 10 minutes a day  Get back to the pool  Go to a meeting     Episode of Care Goals: Minimal progress - ACTION (Actively working towards change); Intervened by reinforcing change plan / affirming steps taken     Current / Ongoing Stressors and Concerns:   Patient is currently socially isolated. She has a conflictual relationship with her .  She is getting minimal physical activity.  She had recent eye surgery     Treatment Objective(s) Addressed in This Session:   Patient will increase frequency of engaging her in ADLs.  Patient will track and record at least 5 pleasant exchanges with . Patient will be able to identify at least 5 positive traits about her .  Patient will reduce level of depressive and anxious features as evidenced by reduction in score on her CHAVO-7 and PHQ-9 (scores of 15 and 16 at first measurment, respectively).     Intervention:   Supportive Therapy: Internal Family Systems Therapy:  Discussed concerns patient has been having recently regarding her  and his health. Also discussed concerns with friends. Processed changes in her life.  Then moved into having patient sit with and explore her emotions, as she made connections of her memories of her mother in the hospital to her  and his health. She noticed the intense pain that she carries, as well as a sense of overwhelm.    ASSESSMENT: Current Emotional / Mental Status (status of significant symptoms):   Risk status (Self / Other harm or suicidal ideation)   Patient denies current fears or concerns for personal safety.   Patient denies current or recent suicidal ideation or behaviors.   Patient denies current or recent homicidal ideation or  behaviors.   Patient denies current or recent self injurious behavior or ideation.   Patient denies other safety concerns.   Patient reports there has been no change in risk factors since their last session.     Patient reports there has been no change in protective factors since their last session.     A safety and risk management plan has been developed including: Patient consented to co-developed safety plan.  Safety and risk management plan was completed.  Patient agreed to use safety plan should any safety concerns arise.  A copy was given to the patient. This was done on 11/24/2020 and is attached to the bottom of the note.     Appearance:   Appropriate    Eye Contact:   Fair    Psychomotor Behavior: Normal    Attitude:   Cooperative    Orientation:   All   Speech    Rate / Production: Normal/ Responsive    Volume:  Normal    Mood:    Anxious  Sad    Affect:    Tearful   Thought Content:  Clear    Thought Form:  Coherent    Insight:    Fair      Medication Review:   No changes to current psychiatric medication(s)      Medication Compliance:   Yes     Changes in Health Issues:   None reported     Chemical Use Review:   Substance Use: decrease in use.  Patient reports frequency of use none since the third week of August.  Provided encouragement towards sobriety        Tobacco Use: No current tobacco use.      Diagnosis:  1. Major depressive disorder, recurrent episode, moderate with anxious distress (H)    2. CHAVO (generalized anxiety disorder)    R/O bipolar disorder    Collateral Reports Completed:   Not Applicable    PLAN: (Patient Tasks / Therapist Tasks / Other)  Be kinder to others and to your own parts  Pay attention to physical sensations when having feelings    Continue to engage in behavioral activation, 10 minutes a day  Get back to the pool  Go to a meeting    MICAELA Johnson    October 27, 2021                                                      "    ______________________________________________________________________    Treatment Plan    Patient's Name: Randi Cleary  YOB: 1955    Date: 21    DSM5 Diagnoses: 296.32 (F33.1) Major Depressive Disorder, Recurrent Episode, Moderate With anxious distress  Psychosocial / Contextual Factors: Patient's entire family of origin has , she now has a sister-in-law and  as support.  Relationship with  is conflictual.  Patient is reporting increase in physical symptoms due to COVID-19.  Patient is off of pain medications.  WHODAS: 42    Referral / Collaboration:  Referral to another professional/service is not indicated at this time.     Anticipated number of session or this episode of care: 12-15      MeasurableTreatment Goal(s) related to diagnosis / functional impairment(s)  Goal 1: Patient will \"jumpstart, getting going with the things I need to be doing around the house as far as picking up, doing things, trying to do something every day.  Also to lessen the animosity between me and my .\"    I will know I've met my goal when my shoulders are fixed and I can see.      Objective #A (Patient Action)    Patient will increase frequency of engaging her in ADLs.  Status: Continued - Date(s): 21    Intervention(s)  Therapist will engage patient in CBT, specifically behavioral activation.    Objective #B  Patient will track and record at least 5 pleasant exchanges with . Patient will be able to identify at least 5 positive traits about her  and how he relates to her.  Status: Continued - Date(s): 21    Intervention(s)  Therapist will teach assertiveness skills and assign homework related to relationship interactions.    Objective #C  Patient will reduce level of depressive and anxious features as evidenced by reduction in score on her CHAVO-7 and PHQ-9 (scores of 15 and 16 at first measurment, respectively).  Status: Continued - Date(s):  " "9/14/21    Intervention(s)  Therapist will engage patient in person-centered therapy and CBT.    Patient has reviewed and agreed to the above plan.      MICAELA SLADE  September 14, 2021                                                Randi Cleary          SAFETY PLAN:  Step 1: Warning signs / cues (Thoughts, images, mood, situation, behavior) that a crisis may be developing:  ? Thoughts: \"I don't want to continue\" \"I am unwanted\"  ? Images: none  ? Thinking Processes: ruminating  ? Mood: anger  ? Behaviors: isolating/withdrawing , can't stop crying, not taking care of myself and not taking care of my responsibilities  ? Situations: small triggers, such as not being able to find something, or dropping something   Step 2: Coping strategies - Things I can do to take my mind off of my problems without contacting another person (relaxation technique, physical activity):  ? Distress Tolerance Strategies:  arts and crafts: drawing, play with my pet , listen to positive and upbeat music: any, change body temperature (ice pack/cold water)  and paced breathing/progressive muscle relaxation  ? Physical Activities: go for a walk, deep breathing and stretching   ? Focus on helpful thoughts:  \"You've been through this before, you can get through it again.\"  Step 3: People and social settings that provide distraction:                 Name: Carmen                            Name: Darien                           Name: Aleida       ? pool, shopping, Carmen's house, [employer]       Step 4: Remind myself of people and things that are important to me and worth living for:  Sidney, Aleida Horton, post-COVID world, options of what could be in your future        Step 5: When I am in crisis, I can ask these people to help me use my safety plan:                 Name: Sidney  Step 6: Making the environment safe:   ? go to sleep/daydream  Step 7: Professionals or agencies I can contact during a crisis:  ? [facility] Daytime " Number: [phone]  ? Suicide Prevention Lifeline: 6-287-878-TALK (2840)  ? Crisis Text Line Service (available 24 hours a day, 7 days a week): Text [place] to 055371    Local Crisis Services: [facility] Crisis: [phone]     Call 911 or go to my nearest emergency department.       I helped develop this safety plan and agree to use it when needed.  I have been given a copy of this plan.       Client signature _________________________________________________________________  Today s date:  11/24/2020  Adapted from Safety Plan Template 2008 Randi Poole and Robby Barba is reprinted with the express permission of the authors.  No portion of the Safety Plan Template may be reproduced without the express, written permission.  You can contact the authors at [email or web address] or [email or web address].

## 2021-10-28 DIAGNOSIS — Z01.812 ENCOUNTER FOR PRE-OPERATIVE LABORATORY TESTING: Primary | ICD-10-CM

## 2021-10-28 DIAGNOSIS — Z11.59 ENCOUNTER FOR SCREENING FOR OTHER VIRAL DISEASES: ICD-10-CM

## 2021-10-29 DIAGNOSIS — M54.12 CERVICAL RADICULOPATHY: Primary | ICD-10-CM

## 2021-10-29 RX ORDER — METHOCARBAMOL 500 MG/1
500 TABLET, FILM COATED ORAL AT BEDTIME
Qty: 30 TABLET | Refills: 0 | Status: SHIPPED | OUTPATIENT
Start: 2021-10-29 | End: 2021-11-01

## 2021-10-29 RX ORDER — METHOCARBAMOL 500 MG/1
500 TABLET, FILM COATED ORAL AT BEDTIME
Qty: 30 TABLET | Refills: 0 | Status: SHIPPED | OUTPATIENT
Start: 2021-10-29 | End: 2021-10-29

## 2021-11-01 ENCOUNTER — TELEPHONE (OUTPATIENT)
Dept: NEUROSURGERY | Facility: CLINIC | Age: 66
End: 2021-11-01

## 2021-11-01 ENCOUNTER — OFFICE VISIT (OUTPATIENT)
Dept: FAMILY MEDICINE | Facility: CLINIC | Age: 66
End: 2021-11-01
Payer: MEDICARE

## 2021-11-01 VITALS
HEIGHT: 66 IN | DIASTOLIC BLOOD PRESSURE: 84 MMHG | BODY MASS INDEX: 35.26 KG/M2 | OXYGEN SATURATION: 99 % | SYSTOLIC BLOOD PRESSURE: 142 MMHG | HEART RATE: 92 BPM | WEIGHT: 219.4 LBS

## 2021-11-01 DIAGNOSIS — Z01.818 PREOP GENERAL PHYSICAL EXAM: Primary | ICD-10-CM

## 2021-11-01 DIAGNOSIS — F31.81 BIPOLAR 2 DISORDER (H): ICD-10-CM

## 2021-11-01 DIAGNOSIS — M54.12 CERVICAL RADICULOPATHY: ICD-10-CM

## 2021-11-01 DIAGNOSIS — Z01.812 ENCOUNTER FOR PRE-OPERATIVE LABORATORY TESTING: ICD-10-CM

## 2021-11-01 PROBLEM — E66.812 CLASS 2 OBESITY DUE TO EXCESS CALORIES WITHOUT SERIOUS COMORBIDITY IN ADULT: Status: ACTIVE | Noted: 2019-11-19

## 2021-11-01 PROBLEM — E66.09 CLASS 2 OBESITY DUE TO EXCESS CALORIES WITHOUT SERIOUS COMORBIDITY IN ADULT: Status: ACTIVE | Noted: 2019-11-19

## 2021-11-01 LAB
ANION GAP SERPL CALCULATED.3IONS-SCNC: 13 MMOL/L (ref 5–18)
APTT PPP: 29 SECONDS (ref 22–38)
BUN SERPL-MCNC: 7 MG/DL (ref 8–22)
CALCIUM SERPL-MCNC: 9.7 MG/DL (ref 8.5–10.5)
CHLORIDE BLD-SCNC: 106 MMOL/L (ref 98–107)
CLOSURE TME COLL+EPINEP BLD: 166 SECONDS
CO2 SERPL-SCNC: 23 MMOL/L (ref 22–31)
CREAT SERPL-MCNC: 0.6 MG/DL (ref 0.6–1.1)
ERYTHROCYTE [DISTWIDTH] IN BLOOD BY AUTOMATED COUNT: 11.7 % (ref 10–15)
GFR SERPL CREATININE-BSD FRML MDRD: >90 ML/MIN/1.73M2
GLUCOSE BLD-MCNC: 85 MG/DL (ref 70–125)
HCT VFR BLD AUTO: 45.9 % (ref 35–47)
HGB BLD-MCNC: 16.3 G/DL (ref 11.7–15.7)
INR PPP: 0.92 (ref 0.85–1.15)
MCH RBC QN AUTO: 34.2 PG (ref 26.5–33)
MCHC RBC AUTO-ENTMCNC: 35.5 G/DL (ref 31.5–36.5)
MCV RBC AUTO: 96 FL (ref 78–100)
PLATELET # BLD AUTO: 229 10E3/UL (ref 150–450)
POTASSIUM BLD-SCNC: 3.7 MMOL/L (ref 3.5–5)
RBC # BLD AUTO: 4.77 10E6/UL (ref 3.8–5.2)
SODIUM SERPL-SCNC: 142 MMOL/L (ref 136–145)
WBC # BLD AUTO: 4.9 10E3/UL (ref 4–11)

## 2021-11-01 PROCEDURE — 36415 COLL VENOUS BLD VENIPUNCTURE: CPT | Performed by: FAMILY MEDICINE

## 2021-11-01 PROCEDURE — 85027 COMPLETE CBC AUTOMATED: CPT | Mod: GA | Performed by: FAMILY MEDICINE

## 2021-11-01 PROCEDURE — 99214 OFFICE O/P EST MOD 30 MIN: CPT | Performed by: FAMILY MEDICINE

## 2021-11-01 PROCEDURE — 85610 PROTHROMBIN TIME: CPT | Mod: GA | Performed by: FAMILY MEDICINE

## 2021-11-01 PROCEDURE — 85730 THROMBOPLASTIN TIME PARTIAL: CPT | Mod: GA | Performed by: FAMILY MEDICINE

## 2021-11-01 PROCEDURE — 85576 BLOOD PLATELET AGGREGATION: CPT

## 2021-11-01 PROCEDURE — 80048 BASIC METABOLIC PNL TOTAL CA: CPT | Performed by: FAMILY MEDICINE

## 2021-11-01 ASSESSMENT — ACTIVITIES OF DAILY LIVING (ADL)
CURRENT_FUNCTION: SHOPPING REQUIRES ASSISTANCE
CURRENT_FUNCTION: HOUSEWORK REQUIRES ASSISTANCE

## 2021-11-01 ASSESSMENT — MIFFLIN-ST. JEOR: SCORE: 1541.94

## 2021-11-01 ASSESSMENT — PATIENT HEALTH QUESTIONNAIRE - PHQ9: SUM OF ALL RESPONSES TO PHQ QUESTIONS 1-9: 13

## 2021-11-01 NOTE — TELEPHONE ENCOUNTER
ORDER FROM: Dr. Gregory    PRE AUTHORIZATION: No PA required. Ok to schedule    METHOD OF PATIENT CONTACT: Spoke with Winnie on the phone. Best number to reach: [phone]    PROCEDURE: Cervical 3-cervical 6 left open door laminoplasty and left cervical 4 posterior foraminotomy    SURGICAL DATE: 11/11/2021 @ 7:30 am - [facility]    COVID TEST: 11/8/21 @ 1:00 PM at the [facility] lab    READINESS VISIT: 11/08/2021 @ 2:00 pm     PCP, CLINIC, PHONE#: Dr. Cari Torres, [facility], [phone]    PRE-OP PHYSICAL: 11/01/2021 @ 3:40 PM with Dr. Torres    FILM INFO: MRI CERVICAL: 1/29/21 @ [place] (loaded to NIL)          XRAY CERVICAL: 7/19/21 @ [place](loaded to NIL)    SURGICAL LETTER: Emailed to patient on 10/28/21
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
## 2021-11-03 ENCOUNTER — VIRTUAL VISIT (OUTPATIENT)
Dept: PSYCHOLOGY | Facility: CLINIC | Age: 66
End: 2021-11-03
Payer: MEDICARE

## 2021-11-03 DIAGNOSIS — F41.1 GAD (GENERALIZED ANXIETY DISORDER): ICD-10-CM

## 2021-11-03 DIAGNOSIS — F31.81 BIPOLAR 2 DISORDER (H): Primary | ICD-10-CM

## 2021-11-03 PROCEDURE — 90837 PSYTX W PT 60 MINUTES: CPT | Mod: 95 | Performed by: SOCIAL WORKER

## 2021-11-03 ASSESSMENT — PATIENT HEALTH QUESTIONNAIRE - PHQ9
SUM OF ALL RESPONSES TO PHQ QUESTIONS 1-9: 14
10. IF YOU CHECKED OFF ANY PROBLEMS, HOW DIFFICULT HAVE THESE PROBLEMS MADE IT FOR YOU TO DO YOUR WORK, TAKE CARE OF THINGS AT HOME, OR GET ALONG WITH OTHER PEOPLE: VERY DIFFICULT
SUM OF ALL RESPONSES TO PHQ QUESTIONS 1-9: 14

## 2021-11-03 NOTE — PROGRESS NOTES
Progress Note    Patient Name: Randi Cleary  Date: 11/3/21         Service Type: Individual      Session Start Time: 10:00 AM  Session End Time: 10:56 AM     Session Length: 56 minutes     Session #: 63    Attendees: Client attended alone    Service Modality:  Video Visit:    Telemedicine Visit: The patient's condition can be safely assessed and treated via synchronous audio and visual telemedicine encounter.      Reason for Telemedicine Visit: Services only offered telehealth    Originating Site (Patient Location): Patient's home    Distant Site (Provider Location): Provider Remote Setting- Home Office    Consent:  The patient/guardian has verbally consented to: the potential risks and benefits of telemedicine (video visit) versus in person care; bill my insurance or make self-payment for services provided; and responsibility for payment of non-covered services.     Mode of Communication:  Video Conference via [employer]    As the provider I attest to compliance with applicable laws and regulations related to telemedicine.      Treatment Plan Last Reviewed: 9/14/21  PHQ-9 / CHAVO-7 :   PHQ 11/1/2021 11/1/2021 11/3/2021   PHQ-9 Total Score 13 13 14   Q9: Thoughts of better off dead/self-harm past 2 weeks Not at all Not at all Not at all   F/U: Thoughts of suicide or self-harm - - -   F/U: Self harm-plan - - -   F/U: Self-harm action - - -   F/U: Safety concerns - - -   Some encounter information is confidential and restricted. Go to Review Flowsheets activity to see all data.     CHAVO-7 SCORE 9/28/2021 10/12/2021 10/26/2021   Total Score 18 (severe anxiety) 15 (severe anxiety) 12 (moderate anxiety)   Total Score 18 15 12   Some encounter information is confidential and restricted. Go to Review Flowsheets activity to see all data.         DATA  Extended Session (53+ minutes): PROLONGED SERVICE IN THE OUTPATIENT SETTING REQUIRING DIRECT (FACE-TO-FACE) PATIENT CONTACT BEYOND  THE USUAL SERVICE:    - Treatment protocol required additional time to complete, due to the nature of diagnosis being treated.  See Interventions section for details  Interactive Complexity: No  Crisis: No      Progress Since Last Session (Related to Symptoms / Goals / Homework):   Symptoms: reports her mood is in a better place     Homework: Partially completed   Be kinder to others and to your own parts  Pay attention to physical sensations when having feelings    Continue to engage in behavioral activation, 10 minutes a day  Get back to the pool     Episode of Care Goals: Minimal progress - ACTION (Actively working towards change); Intervened by reinforcing change plan / affirming steps taken     Current / Ongoing Stressors and Concerns:   Patient is currently socially isolated. She has a conflictual relationship with her .  She is getting minimal physical activity.  She had recent eye surgery     Treatment Objective(s) Addressed in This Session:   Patient will increase frequency of engaging her in ADLs.  Patient will track and record at least 5 pleasant exchanges with . Patient will be able to identify at least 5 positive traits about her .  Patient will reduce level of depressive and anxious features as evidenced by reduction in score on her CHAVO-7 and PHQ-9 (scores of 15 and 16 at first measurment, respectively).     Intervention:   Supportive Therapy: Internal Family Systems Therapy: Discussed concern related to her and her  having a disagreement.  Discussed upcoming surgery and questions around the idea of delaying it.  Had patient connect this back to past times she had delayed surgeries.  Discussed bipolar disorder, talking about what sebastian can look like. Identified the racing thoughts sometimes leading to frustration or irritability.  Or a sense of euphoria that actually can be problematic, such as spending way more money than she has on a shopping spree of things she does not  need.      ASSESSMENT: Current Emotional / Mental Status (status of significant symptoms):   Risk status (Self / Other harm or suicidal ideation)   Patient denies current fears or concerns for personal safety.   Patient denies current or recent suicidal ideation or behaviors.   Patient denies current or recent homicidal ideation or behaviors.   Patient denies current or recent self injurious behavior or ideation.   Patient denies other safety concerns.   Patient reports there has been no change in risk factors since their last session.     Patient reports there has been no change in protective factors since their last session.     A safety and risk management plan has been developed including: Patient consented to co-developed safety plan.  Safety and risk management plan was completed.  Patient agreed to use safety plan should any safety concerns arise.  A copy was given to the patient. This was done on 11/24/2020 and is attached to the bottom of the note.     Appearance:   Appropriate    Eye Contact:   Fair    Psychomotor Behavior: Normal    Attitude:   Cooperative    Orientation:   All   Speech    Rate / Production: Normal/ Responsive    Volume:  Normal    Mood:    Anxious  Sad    Affect:    Tearful   Thought Content:  Clear    Thought Form:  Coherent    Insight:    Fair      Medication Review:   No changes to current psychiatric medication(s)      Medication Compliance:   Yes     Changes in Health Issues:   None reported     Chemical Use Review:   Substance Use: decrease in use.  Patient reports frequency of use none since the third week of August.  Provided encouragement towards sobriety        Tobacco Use: No current tobacco use.      Diagnosis:  1. Bipolar 2 disorder (H)    2. CHAVO (generalized anxiety disorder)        Collateral Reports Completed:   Not Applicable    PLAN: (Patient Tasks / Therapist Tasks / Other)  Notice when anger comes up  Be kinder to others and to your own parts  Pay attention to  "physical sensations when having feelings    Continue to engage in behavioral activation, 10 minutes a day  Get back to the pool    Continue MICAELA DYE    November 3, 2021                                                         ______________________________________________________________________    Treatment Plan    Patient's Name: Randi Cleary  YOB: 1955    Date: 21    DSM5 Diagnoses: 296.32 (F33.1) Major Depressive Disorder, Recurrent Episode, Moderate With anxious distress  Psychosocial / Contextual Factors: Patient's entire family of origin has , she now has a sister-in-law and  as support.  Relationship with  is conflictual.  Patient is reporting increase in physical symptoms due to COVID-19.  Patient is off of pain medications.  WHODAS: 42    Referral / Collaboration:  Referral to another professional/service is not indicated at this time.     Anticipated number of session or this episode of care: 12-15      MeasurableTreatment Goal(s) related to diagnosis / functional impairment(s)  Goal 1: Patient will \"jumpstart, getting going with the things I need to be doing around the house as far as picking up, doing things, trying to do something every day.  Also to lessen the animosity between me and my .\"    I will know I've met my goal when my shoulders are fixed and I can see.      Objective #A (Patient Action)    Patient will increase frequency of engaging her in ADLs.  Status: Continued - Date(s): 21    Intervention(s)  Therapist will engage patient in CBT, specifically behavioral activation.    Objective #B  Patient will track and record at least 5 pleasant exchanges with . Patient will be able to identify at least 5 positive traits about her  and how he relates to her.  Status: Continued - Date(s): 21    Intervention(s)  Therapist will teach assertiveness skills and assign homework related to relationship " "interactions.    Objective #C  Patient will reduce level of depressive and anxious features as evidenced by reduction in score on her CHAVO-7 and PHQ-9 (scores of 15 and 16 at first measurment, respectively).  Status: Continued - Date(s):  9/14/21    Intervention(s)  Therapist will engage patient in person-centered therapy and CBT.    Patient has reviewed and agreed to the above plan.      CRUZ MICAELA BAKER JO  September 14, 2021                                                Randi Cleary          SAFETY PLAN:  Step 1: Warning signs / cues (Thoughts, images, mood, situation, behavior) that a crisis may be developing:  ? Thoughts: \"I don't want to continue\" \"I am unwanted\"  ? Images: none  ? Thinking Processes: ruminating  ? Mood: anger  ? Behaviors: isolating/withdrawing , can't stop crying, not taking care of myself and not taking care of my responsibilities  ? Situations: small triggers, such as not being able to find something, or dropping something   Step 2: Coping strategies - Things I can do to take my mind off of my problems without contacting another person (relaxation technique, physical activity):  ? Distress Tolerance Strategies:  arts and crafts: drawing, play with my pet , listen to positive and upbeat music: any, change body temperature (ice pack/cold water)  and paced breathing/progressive muscle relaxation  ? Physical Activities: go for a walk, deep breathing and stretching   ? Focus on helpful thoughts:  \"You've been through this before, you can get through it again.\"  Step 3: People and social settings that provide distraction:                 Name: Carmen                            Name: Darien                           Name: Aleida       ? pool, shopping, Carmen's house, [employer]       Step 4: Remind myself of people and things that are important to me and worth living for:  Clifford Little Donna, post-COVID world, options of what could be in your future        Step 5: When I am in crisis, I can ask " these people to help me use my safety plan:                 Name: Sidney  Step 6: Making the environment safe:   ? go to sleep/daydream  Step 7: Professionals or agencies I can contact during a crisis:  ? [facility] Number: [phone]  ? Suicide Prevention Lifeline: 2-859-847-TALK (8295)  ? Crisis Text Line Service (available 24 hours a day, 7 days a week): Text [place] to 433050    Local Crisis Services: [facility] Crisis: [phone]     Call 911 or go to my nearest emergency department.       I helped develop this safety plan and agree to use it when needed.  I have been given a copy of this plan.       Client signature _________________________________________________________________  Today s date:  11/24/2020  Adapted from Safety Plan Template 2008 Randi Poole and Robby Barba is reprinted with the express permission of the authors.  No portion of the Safety Plan Template may be reproduced without the express, written permission.  You can contact the authors at [email or web address] or [email or web address].

## 2021-11-03 NOTE — PATIENT INSTRUCTIONS
Notice when anger comes up  Be kinder to others and to your own parts  Pay attention to physical sensations when having feelings    Continue to engage in behavioral activation, 10 minutes a day  Get back to the pool

## 2021-11-04 ASSESSMENT — PATIENT HEALTH QUESTIONNAIRE - PHQ9: SUM OF ALL RESPONSES TO PHQ QUESTIONS 1-9: 14

## 2021-11-05 ENCOUNTER — VIRTUAL VISIT (OUTPATIENT)
Dept: PSYCHOLOGY | Facility: CLINIC | Age: 66
End: 2021-11-05
Payer: MEDICARE

## 2021-11-05 ENCOUNTER — TELEPHONE (OUTPATIENT)
Dept: NEUROSURGERY | Facility: CLINIC | Age: 66
End: 2021-11-05

## 2021-11-05 DIAGNOSIS — F41.1 GAD (GENERALIZED ANXIETY DISORDER): ICD-10-CM

## 2021-11-05 DIAGNOSIS — F31.81 BIPOLAR 2 DISORDER (H): Primary | ICD-10-CM

## 2021-11-05 PROCEDURE — 90834 PSYTX W PT 45 MINUTES: CPT | Mod: 95 | Performed by: SOCIAL WORKER

## 2021-11-05 RX ORDER — FERROUS SULFATE 325(65) MG
325 TABLET ORAL
COMMUNITY
End: 2021-11-08 | Stop reason: HOSPADM

## 2021-11-05 RX ORDER — RIBOFLAVIN (VITAMIN B2) 100 MG
100 TABLET ORAL 3 TIMES DAILY
COMMUNITY
End: 2021-11-08 | Stop reason: HOSPADM

## 2021-11-05 NOTE — TELEPHONE ENCOUNTER
Pt called to report she has a nuclear stress test scheduled for 11/12/2021. Pt stated that she would rather undergo the test and get clearance than attempt to have surgery without it. She would like to reschedule surgery.     Notified  and surgery scheduling.     Sowmya Oshea RN

## 2021-11-05 NOTE — PROGRESS NOTES
Progress Note    Patient Name: Randi Cleary  Date: 11/5/21         Service Type: Individual      Session Start Time: 8:13 AM  Session End Time: 9:00 AM     Session Length: 47 minutes     Session #: 64    Attendees: Client attended alone    Service Modality:  Video Visit:    Telemedicine Visit: The patient's condition can be safely assessed and treated via synchronous audio and visual telemedicine encounter.      Reason for Telemedicine Visit: Services only offered telehealth    Originating Site (Patient Location): Patient's home    Distant Site (Provider Location): Provider Remote Setting- Home Office    Consent:  The patient/guardian has verbally consented to: the potential risks and benefits of telemedicine (video visit) versus in person care; bill my insurance or make self-payment for services provided; and responsibility for payment of non-covered services.     Mode of Communication:  Video Conference via [employer]    As the provider I attest to compliance with applicable laws and regulations related to telemedicine.      Treatment Plan Last Reviewed: 9/14/21  PHQ-9 / CHAVO-7 :   PHQ 11/1/2021 11/1/2021 11/3/2021   PHQ-9 Total Score 13 13 14   Q9: Thoughts of better off dead/self-harm past 2 weeks Not at all Not at all Not at all   F/U: Thoughts of suicide or self-harm - - -   F/U: Self harm-plan - - -   F/U: Self-harm action - - -   F/U: Safety concerns - - -   Some encounter information is confidential and restricted. Go to Review Flowsheets activity to see all data.     CHAVO-7 SCORE 9/28/2021 10/12/2021 10/26/2021   Total Score 18 (severe anxiety) 15 (severe anxiety) 12 (moderate anxiety)   Total Score 18 15 12   Some encounter information is confidential and restricted. Go to Review Flowsheets activity to see all data.         DATA  Extended Session (53+ minutes): No  Interactive Complexity: No  Crisis: No      Progress Since Last Session (Related to Symptoms /  Goals / Homework):   Symptoms: reports her mood is continuing to improve     Homework: Partially completed   Notice when anger comes up  Be kinder to others and to your own parts  Pay attention to physical sensations when having feelings    Continue to engage in behavioral activation, 10 minutes a day  Get back to the pool    Continue IFS     Episode of Care Goals: Minimal progress - ACTION (Actively working towards change); Intervened by reinforcing change plan / affirming steps taken     Current / Ongoing Stressors and Concerns:   Patient is currently socially isolated. She has a conflictual relationship with her .  She is getting minimal physical activity.  She had recent eye surgery     Treatment Objective(s) Addressed in This Session:   Patient will increase frequency of engaging her in ADLs.  Patient will track and record at least 5 pleasant exchanges with . Patient will be able to identify at least 5 positive traits about her .  Patient will reduce level of depressive and anxious features as evidenced by reduction in score on her CHAVO-7 and PHQ-9 (scores of 15 and 16 at first measurment, respectively).     Intervention:   Supportive Therapy: Discussed having restarted the medications and how she already feels less irritable.  Started looking more into bipolar disorder and history that she sees as connected to this.    ASSESSMENT: Current Emotional / Mental Status (status of significant symptoms):   Risk status (Self / Other harm or suicidal ideation)   Patient denies current fears or concerns for personal safety.   Patient denies current or recent suicidal ideation or behaviors.   Patient denies current or recent homicidal ideation or behaviors.   Patient denies current or recent self injurious behavior or ideation.   Patient denies other safety concerns.   Patient reports there has been no change in risk factors since their last session.     Patient reports there has been no change in  protective factors since their last session.     A safety and risk management plan has been developed including: Patient consented to co-developed safety plan.  Safety and risk management plan was completed.  Patient agreed to use safety plan should any safety concerns arise.  A copy was given to the patient. This was done on 11/24/2020 and is attached to the bottom of the note.     Appearance:   Appropriate    Eye Contact:   Fair    Psychomotor Behavior: Normal    Attitude:   Cooperative    Orientation:   All   Speech    Rate / Production: Normal/ Responsive    Volume:  Normal    Mood:    Anxious  Sad    Affect:    Tearful   Thought Content:  Clear    Thought Form:  Coherent    Insight:    Fair      Medication Review:   Changes to psychiatric medications, see updated Medication List in EPIC.       Medication Compliance:   Yes     Changes in Health Issues:   None reported     Chemical Use Review:   Substance Use: decrease in use.  Patient reports frequency of use none since the third week of August.  Provided encouragement towards sobriety        Tobacco Use: No current tobacco use.      Diagnosis:  1. Bipolar 2 disorder (H)    2. CHAVO (generalized anxiety disorder)        Collateral Reports Completed:   Not Applicable    PLAN: (Patient Tasks / Therapist Tasks / Other)  Notice when anger comes up  Be kinder to others and to your own parts  Pay attention to physical sensations when having feelings    Continue to engage in behavioral activation, 10 minutes a day  Get back to the pool    MICAELA Johnson    November 5, 2021                                                         ______________________________________________________________________    Treatment Plan    Patient's Name: Randi lCeary  YOB: 1955    Date: 9/14/21    DSM5 Diagnoses: 296.32 (F33.1) Major Depressive Disorder, Recurrent Episode, Moderate With anxious distress  Psychosocial / Contextual Factors: Patient's  "entire family of origin has , she now has a sister-in-law and  as support.  Relationship with  is conflictual.  Patient is reporting increase in physical symptoms due to COVID-19.  Patient is off of pain medications.  WHODAS: 42    Referral / Collaboration:  Referral to another professional/service is not indicated at this time.     Anticipated number of session or this episode of care: 12-15      MeasurableTreatment Goal(s) related to diagnosis / functional impairment(s)  Goal 1: Patient will \"jumpstart, getting going with the things I need to be doing around the house as far as picking up, doing things, trying to do something every day.  Also to lessen the animosity between me and my .\"    I will know I've met my goal when my shoulders are fixed and I can see.      Objective #A (Patient Action)    Patient will increase frequency of engaging her in ADLs.  Status: Continued - Date(s): 21    Intervention(s)  Therapist will engage patient in CBT, specifically behavioral activation.    Objective #B  Patient will track and record at least 5 pleasant exchanges with . Patient will be able to identify at least 5 positive traits about her  and how he relates to her.  Status: Continued - Date(s): 21    Intervention(s)  Therapist will teach assertiveness skills and assign homework related to relationship interactions.    Objective #C  Patient will reduce level of depressive and anxious features as evidenced by reduction in score on her CHAVO-7 and PHQ-9 (scores of 15 and 16 at first measurment, respectively).  Status: Continued - Date(s):  21    Intervention(s)  Therapist will engage patient in person-centered therapy and CBT.    Patient has reviewed and agreed to the above plan.      MICAELA SLADE  2021                                                Randi Cleary          SAFETY PLAN:  Step 1: Warning signs / cues (Thoughts, images, mood, situation, " "behavior) that a crisis may be developing:  ? Thoughts: \"I don't want to continue\" \"I am unwanted\"  ? Images: none  ? Thinking Processes: ruminating  ? Mood: anger  ? Behaviors: isolating/withdrawing , can't stop crying, not taking care of myself and not taking care of my responsibilities  ? Situations: small triggers, such as not being able to find something, or dropping something   Step 2: Coping strategies - Things I can do to take my mind off of my problems without contacting another person (relaxation technique, physical activity):  ? Distress Tolerance Strategies:  arts and crafts: drawing, play with my pet , listen to positive and upbeat music: any, change body temperature (ice pack/cold water)  and paced breathing/progressive muscle relaxation  ? Physical Activities: go for a walk, deep breathing and stretching   ? Focus on helpful thoughts:  \"You've been through this before, you can get through it again.\"  Step 3: People and social settings that provide distraction:                 Name: Carmen                            Name: Darien                           Name: Aleida       ? pool, shopping, Carmen's house, [employer]       Step 4: Remind myself of people and things that are important to me and worth living for:  Clifford Little Donna, post-COVID world, options of what could be in your future        Step 5: When I am in crisis, I can ask these people to help me use my safety plan:                 Name: Sidney  Step 6: Making the environment safe:   ? go to sleep/daydream  Step 7: Professionals or agencies I can contact during a crisis:  ? [facility] Number: [phone]  ? Suicide Prevention Lifeline: 2-411-102-JIXB (1715)  ? Crisis Text Line Service (available 24 hours a day, 7 days a week): Text [place] to 127288    Local Crisis Services: [facility] Crisis: [phone]     Call 911 or go to my nearest emergency department.       I helped develop this safety plan and agree to use it " when needed.  I have been given a copy of this plan.       Client signature _________________________________________________________________  Today s date:  11/24/2020  Adapted from Safety Plan Template 2008 Randi Poole and Robby Barba is reprinted with the express permission of the authors.  No portion of the Safety Plan Template may be reproduced without the express, written permission.  You can contact the authors at [email or web address] or [email or web address].

## 2021-11-08 ENCOUNTER — VIRTUAL VISIT (OUTPATIENT)
Dept: PSYCHOLOGY | Facility: CLINIC | Age: 66
End: 2021-11-08
Payer: MEDICARE

## 2021-11-08 DIAGNOSIS — F31.81 BIPOLAR 2 DISORDER (H): Primary | ICD-10-CM

## 2021-11-08 DIAGNOSIS — F41.1 GAD (GENERALIZED ANXIETY DISORDER): ICD-10-CM

## 2021-11-08 PROCEDURE — 90834 PSYTX W PT 45 MINUTES: CPT | Mod: 95 | Performed by: SOCIAL WORKER

## 2021-11-08 NOTE — PROGRESS NOTES
Progress Note    Patient Name: Randi Cleary  Date: 11/8/21         Service Type: Individual      Session Start Time: 11:30 AM  Session End Time: 12:18 PM     Session Length: 48 minutes     Session #: 65    Attendees: Client and Spouse / Significant Other    Service Modality:  Video Visit:    Telemedicine Visit: The patient's condition can be safely assessed and treated via synchronous audio and visual telemedicine encounter.      Reason for Telemedicine Visit: Services only offered telehealth    Originating Site (Patient Location): Patient's home    Distant Site (Provider Location): Provider Remote Setting- Home Office    Consent:  The patient/guardian has verbally consented to: the potential risks and benefits of telemedicine (video visit) versus in person care; bill my insurance or make self-payment for services provided; and responsibility for payment of non-covered services.     Mode of Communication:  Video Conference via [employer]    As the provider I attest to compliance with applicable laws and regulations related to telemedicine.      Treatment Plan Last Reviewed: 9/14/21  PHQ-9 / CHAVO-7 :   PHQ 11/1/2021 11/1/2021 11/3/2021   PHQ-9 Total Score 13 13 14   Q9: Thoughts of better off dead/self-harm past 2 weeks Not at all Not at all Not at all   F/U: Thoughts of suicide or self-harm - - -   F/U: Self harm-plan - - -   F/U: Self-harm action - - -   F/U: Safety concerns - - -   Some encounter information is confidential and restricted. Go to Review Flowsheets activity to see all data.     CHAVO-7 SCORE 9/28/2021 10/12/2021 10/26/2021   Total Score 18 (severe anxiety) 15 (severe anxiety) 12 (moderate anxiety)   Total Score 18 15 12   Some encounter information is confidential and restricted. Go to Review Flowsheets activity to see all data.         DATA  Extended Session (53+ minutes): No  Interactive Complexity: No  Crisis: No      Progress Since Last Session (Related  to Symptoms / Goals / Homework):   Symptoms: no change since prior session     Homework: Partially completed   Notice when anger comes up  Be kinder to others and to your own parts  Pay attention to physical sensations when having feelings    Continue to engage in behavioral activation, 10 minutes a day  Get back to the pool    Continue IFS     Episode of Care Goals: Minimal progress - ACTION (Actively working towards change); Intervened by reinforcing change plan / affirming steps taken     Current / Ongoing Stressors and Concerns:   Patient is currently socially isolated. She has a conflictual relationship with her .  She is getting minimal physical activity.  She had recent eye surgery     Treatment Objective(s) Addressed in This Session:   Patient will increase frequency of engaging her in ADLs.  Patient will track and record at least 5 pleasant exchanges with . Patient will be able to identify at least 5 positive traits about her .  Patient will reduce level of depressive and anxious features as evidenced by reduction in score on her CAHVO-7 and PHQ-9 (scores of 15 and 16 at first measurment, respectively).     Intervention:   Supportive Therapy: Patient shared her surgery is cancelled, processed this. Talked about positive changes in her life.    Discussed anger. Talked about ways to manage this, including taking a break from the news. Also talked about changing scenery when upset to reduce the length of time she is upset.Talked about finding ways to care less of what other people thought of her. Discussed finding a hobby to help find a place to ground herself.    Talked about contacting her provider about additional medication options.    ASSESSMENT: Current Emotional / Mental Status (status of significant symptoms):   Risk status (Self / Other harm or suicidal ideation)   Patient denies current fears or concerns for personal safety.   Patient denies current or recent suicidal ideation or  behaviors.   Patient denies current or recent homicidal ideation or behaviors.   Patient denies current or recent self injurious behavior or ideation.   Patient denies other safety concerns.   Patient reports there has been no change in risk factors since their last session.     Patient reports there has been no change in protective factors since their last session.     A safety and risk management plan has been developed including: Patient consented to co-developed safety plan.  Safety and risk management plan was completed.  Patient agreed to use safety plan should any safety concerns arise.  A copy was given to the patient. This was done on 11/24/2020 and is attached to the bottom of the note.     Appearance:   Appropriate    Eye Contact:   Fair    Psychomotor Behavior: Normal    Attitude:   Cooperative    Orientation:   All   Speech    Rate / Production: Normal/ Responsive    Volume:  Normal    Mood:    Anxious  Sad    Affect:    Appropriate    Thought Content:  Clear    Thought Form:  Coherent    Insight:    Fair      Medication Review:   No changes to current psychiatric medication(s)      Medication Compliance:   Yes     Changes in Health Issues:   None reported     Chemical Use Review:   Substance Use: decrease in use.  Patient reports frequency of use none since the third week of August.  Provided encouragement towards sobriety        Tobacco Use: No current tobacco use.      Diagnosis:  1. Bipolar 2 disorder (H)    2. 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  Choose not to watch the news  Make an appointment with Shayne  Consider making jewelry or art     Notice when anger comes up  Be kinder to others and to your own parts  Pay attention to physical sensations when having feelings    Continue to engage in behavioral activation, 10 minutes a day  Get back to the pool    Continue IFS,  "work on building crust      MICAELA SLADE    2021                                                         ______________________________________________________________________    Treatment Plan    Patient's Name: Randi Cleary  YOB: 1955    Date: 21    DSM5 Diagnoses: 296.32 (F33.1) Major Depressive Disorder, Recurrent Episode, Moderate With anxious distress  Psychosocial / Contextual Factors: Patient's entire family of origin has , she now has a sister-in-law and  as support.  Relationship with  is conflictual.  Patient is reporting increase in physical symptoms due to COVID-19.  Patient is off of pain medications.  WHODAS: 42    Referral / Collaboration:  Referral to another professional/service is not indicated at this time.     Anticipated number of session or this episode of care: 12-15      MeasurableTreatment Goal(s) related to diagnosis / functional impairment(s)  Goal 1: Patient will \"jumpstart, getting going with the things I need to be doing around the house as far as picking up, doing things, trying to do something every day.  Also to lessen the animosity between me and my .\"    I will know I've met my goal when my shoulders are fixed and I can see.      Objective #A (Patient Action)    Patient will increase frequency of engaging her in ADLs.  Status: Continued - Date(s): 21    Intervention(s)  Therapist will engage patient in CBT, specifically behavioral activation.    Objective #B  Patient will track and record at least 5 pleasant exchanges with . Patient will be able to identify at least 5 positive traits about her  and how he relates to her.  Status: Continued - Date(s): 21    Intervention(s)  Therapist will teach assertiveness skills and assign homework related to relationship interactions.    Objective #C  Patient will reduce level of depressive and anxious features as evidenced by reduction in score on her " "CHAVO-7 and PHQ-9 (scores of 15 and 16 at first measurment, respectively).  Status: Continued - Date(s):  9/14/21    Intervention(s)  Therapist will engage patient in person-centered therapy and CBT.    Patient has reviewed and agreed to the above plan.      CRUZ MICAELA BAKER JO  September 14, 2021                                                Randi Cleary          SAFETY PLAN:  Step 1: Warning signs / cues (Thoughts, images, mood, situation, behavior) that a crisis may be developing:  ? Thoughts: \"I don't want to continue\" \"I am unwanted\"  ? Images: none  ? Thinking Processes: ruminating  ? Mood: anger  ? Behaviors: isolating/withdrawing , can't stop crying, not taking care of myself and not taking care of my responsibilities  ? Situations: small triggers, such as not being able to find something, or dropping something   Step 2: Coping strategies - Things I can do to take my mind off of my problems without contacting another person (relaxation technique, physical activity):  ? Distress Tolerance Strategies:  arts and crafts: drawing, play with my pet , listen to positive and upbeat music: any, change body temperature (ice pack/cold water)  and paced breathing/progressive muscle relaxation  ? Physical Activities: go for a walk, deep breathing and stretching   ? Focus on helpful thoughts:  \"You've been through this before, you can get through it again.\"  Step 3: People and social settings that provide distraction:                 Name: Carmen                            Name: Darien                           Name: Aleida       ? pool, shopping, Carmen's house, [employer]       Step 4: Remind myself of people and things that are important to me and worth living for:  Clifford Little Donna, post-COVID world, options of what could be in your future        Step 5: When I am in crisis, I can ask these people to help me use my safety plan:                 Name: Sidney  Step 6: Making the environment safe:   ? go to " sleep/daydream  Step 7: Professionals or agencies I can contact during a crisis:  ? [facility] Daytime Number: [phone]  ? Suicide Prevention Lifeline: 0-931-685-YIUZ (8820)  ? Crisis Text Line Service (available 24 hours a day, 7 days a week): Text [place] to 077700    Local Crisis Services: [facility] Crisis: [phone]     Call 911 or go to my nearest emergency department.       I helped develop this safety plan and agree to use it when needed.  I have been given a copy of this plan.       Client signature _________________________________________________________________  Today s date:  11/24/2020  Adapted from Safety Plan Template 2008 Randi Poole and Robby Barba is reprinted with the express permission of the authors.  No portion of the Safety Plan Template may be reproduced without the express, written permission.  You can contact the authors at [email or web address] or [email or web address].

## 2021-11-08 NOTE — PATIENT INSTRUCTIONS
Move plant stands today  Choose not to watch the news  Make an appointment with Shayne  Consider making jewelry or art     Notice when anger comes up  Be kinder to others and to your own parts  Pay attention to physical sensations when having feelings    Continue to engage in behavioral activation, 10 minutes a day  Get back to the pool

## 2021-11-09 ENCOUNTER — DOCUMENTATION ONLY (OUTPATIENT)
Dept: SLEEP MEDICINE | Facility: CLINIC | Age: 66
End: 2021-11-09
Payer: MEDICARE

## 2021-11-09 NOTE — PROGRESS NOTES
STM recheck     Data only recheck patient did not return last STM call and has not yet started using machine again.      Assessment: pt has not resumed using the machine.            PAP settings: CPAP min 7.0 cm  H20       CPAP max 11.0 cm  H20           RESMED EPR level Setting: THREE    RESMED Soft response setting:  ON    Mask type:  Nasal Mask    Objective measures: 14 day rolling measures patient still has not yet started using her device.      Routing to provider.

## 2021-11-10 ENCOUNTER — TELEPHONE (OUTPATIENT)
Dept: CARDIOLOGY | Facility: CLINIC | Age: 66
End: 2021-11-10
Payer: MEDICARE

## 2021-11-11 ENCOUNTER — TELEPHONE (OUTPATIENT)
Dept: CARDIOLOGY | Facility: CLINIC | Age: 66
End: 2021-11-11
Payer: MEDICARE

## 2021-11-12 ENCOUNTER — HOSPITAL ENCOUNTER (OUTPATIENT)
Dept: NUCLEAR MEDICINE | Facility: CLINIC | Age: 66
End: 2021-11-12
Attending: INTERNAL MEDICINE
Payer: MEDICARE

## 2021-11-12 ENCOUNTER — HOSPITAL ENCOUNTER (OUTPATIENT)
Dept: CARDIOLOGY | Facility: CLINIC | Age: 66
End: 2021-11-12
Attending: INTERNAL MEDICINE
Payer: MEDICARE

## 2021-11-12 DIAGNOSIS — I27.20 PULMONARY HYPERTENSION (H): ICD-10-CM

## 2021-11-12 DIAGNOSIS — M25.50 PAIN IN JOINT, MULTIPLE SITES: Primary | ICD-10-CM

## 2021-11-12 DIAGNOSIS — R06.02 SHORTNESS OF BREATH: ICD-10-CM

## 2021-11-12 DIAGNOSIS — R06.09 DYSPNEA ON EXERTION: ICD-10-CM

## 2021-11-12 LAB
CV STRESS CURRENT BP HE: NORMAL
CV STRESS CURRENT HR HE: 74
CV STRESS CURRENT HR HE: 75
CV STRESS CURRENT HR HE: 79
CV STRESS CURRENT HR HE: 80
CV STRESS CURRENT HR HE: 81
CV STRESS CURRENT HR HE: 82
CV STRESS CURRENT HR HE: 83
CV STRESS CURRENT HR HE: 83
CV STRESS CURRENT HR HE: 84
CV STRESS DEVIATION TIME HE: NORMAL
CV STRESS ECHO PERCENT HR HE: NORMAL
CV STRESS EXERCISE STAGE HE: NORMAL
CV STRESS FINAL RESTING BP HE: NORMAL
CV STRESS FINAL RESTING HR HE: 80
CV STRESS MAX HR HE: 85
CV STRESS MAX TREADMILL GRADE HE: 0
CV STRESS MAX TREADMILL SPEED HE: 0
CV STRESS PEAK DIA BP HE: NORMAL
CV STRESS PEAK SYS BP HE: NORMAL
CV STRESS PHASE HE: NORMAL
CV STRESS PROTOCOL HE: NORMAL
CV STRESS RESTING PT POSITION HE: NORMAL
CV STRESS ST DEVIATION AMOUNT HE: NORMAL
CV STRESS ST DEVIATION ELEVATION HE: NORMAL
CV STRESS ST EVELATION AMOUNT HE: NORMAL
CV STRESS TEST TYPE HE: NORMAL
CV STRESS TOTAL STAGE TIME MIN 1 HE: NORMAL
RATE PRESSURE PRODUCT: 9605
STRESS ECHO BASELINE DIASTOLIC HE: 86
STRESS ECHO BASELINE HR: 81
STRESS ECHO BASELINE SYSTOLIC BP: 127
STRESS ECHO CALCULATED PERCENT HR: 56 %
STRESS ECHO LAST STRESS DIASTOLIC BP: 60
STRESS ECHO LAST STRESS HR: 80
STRESS ECHO LAST STRESS SYSTOLIC BP: 113
STRESS ECHO TARGET HR: 152

## 2021-11-12 PROCEDURE — A9500 TC99M SESTAMIBI: HCPCS | Performed by: INTERNAL MEDICINE

## 2021-11-12 PROCEDURE — 343N000001 HC RX 343: Performed by: INTERNAL MEDICINE

## 2021-11-12 PROCEDURE — 93017 CV STRESS TEST TRACING ONLY: CPT | Performed by: INTERNAL MEDICINE

## 2021-11-12 PROCEDURE — 78452 HT MUSCLE IMAGE SPECT MULT: CPT | Mod: 26 | Performed by: INTERNAL MEDICINE

## 2021-11-12 PROCEDURE — 93018 CV STRESS TEST I&R ONLY: CPT | Mod: MG | Performed by: INTERNAL MEDICINE

## 2021-11-12 PROCEDURE — G1004 CDSM NDSC: HCPCS

## 2021-11-12 PROCEDURE — G1004 CDSM NDSC: HCPCS | Performed by: INTERNAL MEDICINE

## 2021-11-12 PROCEDURE — 250N000011 HC RX IP 250 OP 636: Performed by: INTERNAL MEDICINE

## 2021-11-12 PROCEDURE — 93016 CV STRESS TEST SUPVJ ONLY: CPT | Performed by: INTERNAL MEDICINE

## 2021-11-12 RX ORDER — AMINOPHYLLINE 25 MG/ML
50 INJECTION, SOLUTION INTRAVENOUS
Status: DISCONTINUED | OUTPATIENT
Start: 2021-11-12 | End: 2021-11-12 | Stop reason: HOSPADM

## 2021-11-12 RX ORDER — HYDROCODONE BITARTRATE AND ACETAMINOPHEN 5; 325 MG/1; MG/1
1 TABLET ORAL AT BEDTIME
Qty: 10 TABLET | Refills: 0 | Status: SHIPPED | OUTPATIENT
Start: 2021-11-12 | End: 2021-11-19

## 2021-11-12 RX ORDER — REGADENOSON 0.08 MG/ML
0.4 INJECTION, SOLUTION INTRAVENOUS ONCE
Status: COMPLETED | OUTPATIENT
Start: 2021-11-12 | End: 2021-11-12

## 2021-11-12 RX ADMIN — REGADENOSON 0.4 MG: 0.08 INJECTION, SOLUTION INTRAVENOUS at 09:20

## 2021-11-12 RX ADMIN — Medication 8 MILLICURIE: at 07:32

## 2021-11-12 RX ADMIN — Medication 30.9 MILLICURIE: at 09:30

## 2021-11-12 NOTE — PROGRESS NOTES
Pt has hx of pul HTN c/o ANNA with increase edema pre-op hx of RBBB so pre-op cardiac evaluation.  COPD exacerbation.    Helen Rossi RN

## 2021-11-12 NOTE — PROGRESS NOTES
Patient's [employer] message.  Reports mood has been quite labile, and I did receive a call from primary care provider at the preop physical.  Also reports significant restless legs.    Describes developing more insight to the bipolar element of her condition.  Notes anger is a significant part.  Did not tolerate lithium carbonate.  Is taking lithium orotate 10 mg at night.  Is using oxcarbazepine 150 mg twice a day.  Higher doses seem to exacerbate the problems.  Unsure if this dose helps.  We discussed increasing the lithium orotate to 20 mg at bedtime.    She notes significant restless legs, and poor sleep, in part related to her neck pain may be a problem.  Her surgery is postponed to December.  Reviews hydrocodone was helpful for restless legs and pain.  We have discussed concerns with substance use.  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    We discussed the plan to add hydrocodone 5 mg at bedtime and then 10-day intervals until she has her surgery will reassess.   is on the fall and is aware to monitor.    She continues with her psychotherapist

## 2021-11-15 ASSESSMENT — ENCOUNTER SYMPTOMS
SINUS CONGESTION: 0
INSOMNIA: 1
WEAKNESS: 1
SMELL DISTURBANCE: 0
MEMORY LOSS: 0
DISTURBANCES IN COORDINATION: 1
WEIGHT GAIN: 0
DECREASED APPETITE: 0
TINGLING: 1
DECREASED CONCENTRATION: 1
BACK PAIN: 1
ALTERED TEMPERATURE REGULATION: 0
DIFFICULTY URINATING: 0
SORE THROAT: 0
COUGH DISTURBING SLEEP: 0
TREMORS: 0
CHILLS: 0
HOARSE VOICE: 1
MYALGIAS: 1
DYSPNEA ON EXERTION: 1
COUGH: 1
TASTE DISTURBANCE: 0
MUSCLE WEAKNESS: 1
INCREASED ENERGY: 1
PANIC: 1
MUSCLE CRAMPS: 0
STIFFNESS: 1
HEADACHES: 1
LOSS OF CONSCIOUSNESS: 0
FEVER: 0
POOR WOUND HEALING: 0
NUMBNESS: 1
HEMOPTYSIS: 0
FATIGUE: 1
DEPRESSION: 1
SKIN CHANGES: 0
WHEEZING: 1
SEIZURES: 0
ARTHRALGIAS: 1
HEMATURIA: 0
SPEECH CHANGE: 0
PARALYSIS: 0
NAIL CHANGES: 0
NIGHT SWEATS: 0
DYSURIA: 0
TROUBLE SWALLOWING: 0
NECK PAIN: 1
SHORTNESS OF BREATH: 1
SINUS PAIN: 0
SPUTUM PRODUCTION: 0
DIZZINESS: 1
FLANK PAIN: 0
WEIGHT LOSS: 1
SNORES LOUDLY: 0
NERVOUS/ANXIOUS: 1
POLYDIPSIA: 0
POLYPHAGIA: 0
JOINT SWELLING: 0
HALLUCINATIONS: 0
NECK MASS: 0
POSTURAL DYSPNEA: 0

## 2021-11-16 ENCOUNTER — OFFICE VISIT (OUTPATIENT)
Dept: CARDIOLOGY | Facility: CLINIC | Age: 66
End: 2021-11-16
Attending: INTERNAL MEDICINE
Payer: MEDICARE

## 2021-11-16 ENCOUNTER — LAB (OUTPATIENT)
Dept: LAB | Facility: CLINIC | Age: 66
End: 2021-11-16
Payer: MEDICARE

## 2021-11-16 VITALS
SYSTOLIC BLOOD PRESSURE: 122 MMHG | BODY MASS INDEX: 34.92 KG/M2 | HEART RATE: 85 BPM | WEIGHT: 217.3 LBS | HEIGHT: 66 IN | DIASTOLIC BLOOD PRESSURE: 76 MMHG | OXYGEN SATURATION: 93 %

## 2021-11-16 DIAGNOSIS — E83.119 HEMOCHROMATOSIS, UNSPECIFIED HEMOCHROMATOSIS TYPE: Primary | ICD-10-CM

## 2021-11-16 DIAGNOSIS — R06.02 SOB (SHORTNESS OF BREATH): ICD-10-CM

## 2021-11-16 DIAGNOSIS — E66.01 MORBID OBESITY (H): Primary | ICD-10-CM

## 2021-11-16 DIAGNOSIS — I27.20 PULMONARY HYPERTENSION (H): ICD-10-CM

## 2021-11-16 DIAGNOSIS — I50.22 CHRONIC SYSTOLIC HEART FAILURE (H): ICD-10-CM

## 2021-11-16 DIAGNOSIS — I50.9 HEART FAILURE (H): ICD-10-CM

## 2021-11-16 DIAGNOSIS — R79.9 ABNORMAL FINDING OF BLOOD CHEMISTRY, UNSPECIFIED: ICD-10-CM

## 2021-11-16 DIAGNOSIS — E83.119 HEMOCHROMATOSIS, UNSPECIFIED HEMOCHROMATOSIS TYPE: ICD-10-CM

## 2021-11-16 LAB
ANION GAP SERPL CALCULATED.3IONS-SCNC: 2 MMOL/L (ref 3–14)
BUN SERPL-MCNC: 13 MG/DL (ref 7–30)
CALCIUM SERPL-MCNC: 9.4 MG/DL (ref 8.5–10.1)
CHLORIDE BLD-SCNC: 110 MMOL/L (ref 94–109)
CO2 SERPL-SCNC: 26 MMOL/L (ref 20–32)
CREAT SERPL-MCNC: 0.57 MG/DL (ref 0.52–1.04)
CRP SERPL-MCNC: <2.9 MG/L (ref 0–8)
ERYTHROCYTE [DISTWIDTH] IN BLOOD BY AUTOMATED COUNT: 11.9 % (ref 10–15)
FERRITIN SERPL-MCNC: 101 NG/ML (ref 8–252)
GFR SERPL CREATININE-BSD FRML MDRD: >90 ML/MIN/1.73M2
GLUCOSE BLD-MCNC: 110 MG/DL (ref 70–99)
HCT VFR BLD AUTO: 47.7 % (ref 35–47)
HGB BLD-MCNC: 16.5 G/DL (ref 11.7–15.7)
IRON SATN MFR SERPL: 92 % (ref 15–46)
IRON SERPL-MCNC: 270 UG/DL (ref 35–180)
MCH RBC QN AUTO: 33.7 PG (ref 26.5–33)
MCHC RBC AUTO-ENTMCNC: 34.6 G/DL (ref 31.5–36.5)
MCV RBC AUTO: 97 FL (ref 78–100)
NT-PROBNP SERPL-MCNC: 94 PG/ML (ref 0–125)
PLATELET # BLD AUTO: 216 10E3/UL (ref 150–450)
POTASSIUM BLD-SCNC: 4.1 MMOL/L (ref 3.4–5.3)
RBC # BLD AUTO: 4.9 10E6/UL (ref 3.8–5.2)
SODIUM SERPL-SCNC: 138 MMOL/L (ref 133–144)
TIBC SERPL-MCNC: 294 UG/DL (ref 240–430)
WBC # BLD AUTO: 7.4 10E3/UL (ref 4–11)

## 2021-11-16 PROCEDURE — 83880 ASSAY OF NATRIURETIC PEPTIDE: CPT | Performed by: PATHOLOGY

## 2021-11-16 PROCEDURE — G0463 HOSPITAL OUTPT CLINIC VISIT: HCPCS

## 2021-11-16 PROCEDURE — 83550 IRON BINDING TEST: CPT | Performed by: INTERNAL MEDICINE

## 2021-11-16 PROCEDURE — 36415 COLL VENOUS BLD VENIPUNCTURE: CPT | Performed by: PATHOLOGY

## 2021-11-16 PROCEDURE — 86140 C-REACTIVE PROTEIN: CPT | Performed by: INTERNAL MEDICINE

## 2021-11-16 PROCEDURE — 82728 ASSAY OF FERRITIN: CPT | Performed by: PATHOLOGY

## 2021-11-16 PROCEDURE — 99214 OFFICE O/P EST MOD 30 MIN: CPT | Performed by: INTERNAL MEDICINE

## 2021-11-16 PROCEDURE — 85027 COMPLETE CBC AUTOMATED: CPT | Performed by: PATHOLOGY

## 2021-11-16 PROCEDURE — 80048 BASIC METABOLIC PNL TOTAL CA: CPT | Performed by: PATHOLOGY

## 2021-11-16 RX ORDER — LIDOCAINE 40 MG/G
CREAM TOPICAL
Status: CANCELLED | OUTPATIENT
Start: 2021-11-16

## 2021-11-16 ASSESSMENT — PAIN SCALES - GENERAL: PAINLEVEL: NO PAIN (0)

## 2021-11-16 ASSESSMENT — MIFFLIN-ST. JEOR: SCORE: 1532.42

## 2021-11-16 NOTE — PATIENT INSTRUCTIONS
Medication Changes:  No medication changes at this time. Please continue current medication regiment.    Patient Instructions:  1. Continue staying active and eat a heart healthy diet.    2. Please keep current list of medications with you at all times.    3. Remember to weigh yourself daily after voiding and before you consume any food or beverages and log the numbers.  If you have gained 2 pounds overnight or 5 pounds in a week contact us immediately for medication adjustments or further instructions.    4. **Please call us immediately if you have any syncope (fainting or passing out), chest pain, edema (swelling or weight gain), or decline in your functional status (general decline in how you are feeling).    5. Patients on Remodulin (treprostinil) or Veletri (epoprostenol): Please make sure that you have your backup pump and supplies with you at all times, your mixing instructions, and contact information for your specialty pharmacy.    Follow up Appointment Information:  Echocardiogram with Follow up after testing      We are located on the third floor of [facility] on [facility].  Our address is     [address] [place] [address] [place] [address] [place] [address]    Thank you for allowing us to be a part of your care here at the [facility]    If you have questions or concerns please contact us at:    Eli Hilton RN, BSN, PHN  Josephine Pratt (Scheduling,Prior Auth)  Nurse Coordinator     Clinic   Pulmonary Hypertension   Pulmonary Hypertension  [facility]  (Phone)[phone]   (Phone) [phone]        (Fax) [phone]    ** Please note that you will NOT receive a reminder call regarding your scheduled testing, reminder calls are for provider appointments only.  If you are scheduled for testing within the [facility] system you may receive a  call regarding pre-registration for billing purposes only.**     Support Group:  Pulmonary Hypertension Association  Https://www.phassociation.org/  **Look at the Events Tab** They even have Support Groups that you can call into    [facility] PH Support Group  Second Saturday of the Month from 1-3 PM   Location: [address] [place] [id number]  Leader: Corry Harvey  Phone: [phone] or [phone]  Email: [email or web address]

## 2021-11-16 NOTE — PROGRESS NOTES
Problem List  1. Possible pulmonary hypertension  2. History of breast cancer              History of mastectomy              Breast reconstruction  3. Atherosclerosis with coronary calcifications  4. Hiatal hernia  5. Hepatic steatosis  6. Diverticulosis  7. KYAW with treatment  8. Psoriasis  9. Grave's disease with ablation  10. Hypertension  11. Asthma  12. Hemochromatosis,gene +  13. Bipolar disorder ?  14. Chronic pain with narcotic dependence  15. Pheochromocytoma (right surgically removed)  16. Elevated body mass index  17. Elevate hemoglobin with macrocytosis  18  Hiatal hernia  19. Chronic pain management  20. Cervical stenosis  21. COVID 19 infection       Plan:  1.Continue Edecrin for diuresis  2. Follows with Dr. Gregory in Sleep medicine - supposed to have a 3 month follow-up but does not appear to have been scheduled. Will route note to Dr. Gregory given persistent fatigue and seems to have difficulty tolerating her CPAP.   3. Repeat Echocardiogram  4. 6 minute walk test  5. Given that stress test was negative there is no contraindication from a cardiovascular perspective to proceed with spinal surgery   6. Follow-up visit with Dr. Edwards in February after completion of echo   7. Patient is vaccinated for COVID     Interval History:   She is frustrated today because she has been waiting for surgery on her neck which has been delayed several times. She had to change PCPs which has also been frustrating for her. Had a COPD exacerbation in October.     Has been struggling with managing her psychiatric conditions. Has been very depressed and having significant anger. Denies thoughts of self-harm.     Had COVID in June and feels that she has never really recovered from it. Reports having cough symptoms and chronic fatigue.     Has been feeling very SOB. Attributes it to weakness from her neck. Has not been significantly swelling. Sleeps in a recliner but lies partially flat. Denies chest pain. Has some  "sensation of palpitations. Reports feeling dizziness - lightheadedness like she might syncopize. She is unsteady on her feet which is attributes to the lightheadedness.     She reports sleeping better during the day than at night. Seems that compliance with her CPAP has been inconsistent.     Wt Readings from Last 5 Encounters:   11/16/21 98.6 kg (217 lb 4.8 oz)   11/01/21 99.5 kg (219 lb 6.4 oz)   10/21/21 98.9 kg (218 lb)   10/18/21 100.2 kg (221 lb)   10/14/21 101.6 kg (223 lb 14.4 oz)   /76 (BP Location: Right arm, Patient Position: Chair, Cuff Size: Adult Regular)   Pulse 85   Ht 1.676 m (5' 6\")   Wt 98.6 kg (217 lb 4.8 oz)   SpO2 93%   BMI 35.07 kg/m      General: NAD  HEENT: No LAD, no oral lesions  Pulmonary: CTAB  Cardiovascular: Normal S1 and S2, RRR, NMRG  Extremities: WWP, no edema  Neuro: A&Ox3, no focal deficits on gross examination      Meds  Current Outpatient Medications   Medication Instructions     albuterol (PROVENTIL) 2.5 mg, Nebulization, EVERY 4 HOURS PRN     Cholecalciferol (VITAMIN D3) 250 MCG (80949 UT) TABS 1 tablet, Oral, DAILY, 52443/day     Cyanocobalamin (VITAMIN B-12) 5000 MCG SUBL Unknown dose. 2 or 3 sprays/day     ethacrynic acid (EDECRIN) 25 MG tablet Take 1 tablet by mouth on Saturday and Wednesday     Fluticasone-Umeclidin-Vilanterol (TRELEGY ELLIPTA) 200-62.5-25 MCG/INH oral inhaler 1 puff, Inhalation, DAILY     HYDROcodone-acetaminophen (NORCO) 5-325 MG tablet 1 tablet, Oral, AT BEDTIME     losartan (COZAAR) 25 mg, Oral, DAILY     magnesium 250 MG tablet 1 tablet, Oral, 2 TIMES DAILY.     medical cannabis (Patient's own supply) SEE ADMIN INSTRUCTIONS, (The purpose of this order is to document that the patient reports taking medical cannabis.  This is not a prescription, and is not used to certify that the patient has a qualifying medical condition.)     methocarbamol (ROBAXIN) 500 MG tablet TAKE 1 TABLET BY MOUTH AT BEDTIME AS NEEDED     Multiple Vitamins-Iron " (MULTIVITAMIN PLUS IRON ADULT) TABS 4 tablets, Oral, DAILY     omeprazole (PRILOSEC) 20 MG DR capsule DAILY     OXcarbazepine (TRILEPTAL) 150 MG tablet One-half tab bedtime 3 nights, one-half tab twice a day 5 days, one twice a day     potassium chloride ER (KLOR-CON M) 20 MEQ CR tablet 20 mEq, Oral, DAILY     PREBIOTIC PRODUCT PO Oral, DAILY     rOPINIRole (REQUIP) 2 MG tablet No dose, route, or frequency recorded.     Synthroid 150 mcg, Oral, DAILY     Turmeric Curcumin 500 MG CAPS Oral     vitamin (B COMPLEX-C) tablet 1 tablet, Oral, DAILY     vitamin E 800 Units, Oral, DAILY       Labs  Results for GEETA BOSS (MRN [id number]) as of 9/1/2021 11:58    Ref. Range 7/13/2021 16:30 7/13/2021 16:32 7/13/2021 16:33   Sodium Latest Ref Range: 133 - 144 mmol/L   142     Potassium Latest Ref Range: 3.4 - 5.3 mmol/L   3.6     Chloride Latest Ref Range: 94 - 109 mmol/L   109     Carbon Dioxide Latest Ref Range: 20 - 32 mmol/L   28     Urea Nitrogen Latest Ref Range: 7 - 30 mg/dL   7     Creatinine Latest Ref Range: 0.52 - 1.04 mg/dL   0.56     GFR Estimate Latest Ref Range: >60 mL/min/1.73m2   >90     Calcium Latest Ref Range: 8.5 - 10.1 mg/dL   8.7     Anion Gap Latest Ref Range: 3 - 14 mmol/L   5     Albumin Latest Ref Range: 3.4 - 5.0 g/dL   3.4     Protein Total Latest Ref Range: 6.8 - 8.8 g/dL   7.0     Bilirubin Total Latest Ref Range: 0.2 - 1.3 mg/dL   0.6     Alkaline Phosphatase Latest Ref Range: 40 - 150 U/L   73     ALT Latest Ref Range: 0 - 50 U/L   37     AST Latest Ref Range: 0 - 45 U/L   24     Erythropoietin Latest Ref Range: 4 - 27 mU/mL   12     Ferritin Latest Ref Range: 8 - 252 ng/mL   44     Iron Latest Ref Range: 35 - 180 ug/dL   165     Iron Binding Cap Latest Ref Range: 240 - 430 ug/dL   265     Iron Saturation Index Latest Ref Range: 15 - 46 %   62 (H)     Lactate Dehydrogenase Latest Ref Range: 81 - 234 U/L     256 (H)   Glucose Latest Ref Range: 70 - 99 mg/dL   92     FIO2 Unknown 0       Ph  Venous Latest Ref Range: 7.32 - 7.43  7.40       PCO2 Venous Latest Ref Range: 40 - 50 mm Hg 46       PO2 Venous Latest Ref Range: 25 - 47 mm Hg 31       Bicarbonate Venous Latest Ref Range: 21 - 28 mmol/L 28       Base Excess Venous Latest Ref Range: -7.7 - 1.9 mmol/L 2.8 (H)       WBC Latest Ref Range: 4.0 - 11.0 10e3/uL   7.0     Hemoglobin Latest Ref Range: 11.7 - 15.7 g/dL   15.8 (H)     Hematocrit Latest Ref Range: 35.0 - 47.0 %   44.8     Platelet Count Latest Ref Range: 150 - 450 10e3/uL   194     RBC Count Latest Ref Range: 3.80 - 5.20 10e6/uL   4.62     MCV Latest Ref Range: 78 - 100 fL   97     MCH Latest Ref Range: 26.5 - 33.0 pg   34.2 (H)     MCHC Latest Ref Range: 31.5 - 36.5 g/dL   35.3     RDW Latest Ref Range: 10.0 - 15.0 %   12.3     % Neutrophils Latest Units: %   69     % Lymphocytes Latest Units: %   20     % Monocytes Latest Units: %   8     % Eosinophils Latest Units: %   2     Absolute Basophils Latest Ref Range: 0.0 - 0.2 10e3/uL   0.0     % Basophils Latest Units: %   1     Absolute Eosinophils Latest Ref Range: 0.0 - 0.7 10e3/uL   0.2     Absolute Immature Granulocytes Latest Ref Range: <=0.0 10e3/uL   0.0     Absolute Lymphocytes Latest Ref Range: 0.8 - 5.3 10e3/uL   1.4     Absolute Monocytes Latest Ref Range: 0.0 - 1.3 10e3/uL   0.5     % Immature Granulocytes Latest Units: %   0     Absolute Neutrophils Latest Ref Range: 1.6 - 8.3 10e3/uL   4.8     Absolute NRBCs Latest Units: 10e3/uL   0.0     NRBCs per 100 WBC Latest Ref Range: <1 /100   0     % Retic Latest Ref Range: 0.5 - 2.0 %     2.4 (H)   Absolute Retic Latest Ref Range: 0.025 - 0.095 10e6/uL     0.112 (H)   Sed Rate Latest Ref Range: 0 - 30 mm/hr     9   Interpretation Unknown     This result conta...            Imaging      Right Heart Pressures      09/2019    RA  A-wave: 16 mmHg  V-wave: 16 mmHg  Mean: 15 mmHg   HR: 88 bpm  RV  Systolic: 44 mmHg  End Diastolic: 16 mmHg  HR: 88 bpm    PA  Systolic:41 mmHg  Diasotlic: 24  mmHg  Mean: 31 mmHg  HR: 88 bpm  PA Sat: 70.8%    PCW  A-wave: 18 mmHg  V-wave: 17 mmHg  Mean: 16 mmHg  HR: 91 bpm    Cardiac Output  CO Jeannie: 5.72 L/min  CI Jeannie: 2.56 L/min/m2  CO TD: Not performed  CI TD: Not performed     Vitals  BP: 168 mmHg / 74 mmHg  SpO2: 92 %  PA Stat: 70.8 %           Echocardiogram           Name: ERIK BOSS  MRN: [id number]  : 1955  Study Date: 2019 03:04 PM  Age: 64 yrs  Gender: Female  Patient Location: [facility]  Reason For Study: Pulmonary hypertension (H)  Ordering Physician: MINO CORTES  Referring Physician: MINO CORTES  Performed By: Siobhan Dee RDCS     BSA: 2.2 m2  Height: 66 in  Weight: 259 lb  BP: 118/76 mmHg  _____________________________________________________________________________  __        Procedure  Echocardiogram with two-dimensional, color and spectral Doppler performed.  Poor acoustic windows. Optison (NDC #6121-2175-59) given intravenously.  Patient was given 3.0 ml mixture of 3 ml Optison and 6 ml saline. 6.0 ml  wasted. IV start location R Hand .  _____________________________________________________________________________  __        Interpretation Summary  1. Global and regional left ventricular function is normal with an EF of 55-  60%.  2. The right ventricle is normal size. Global right ventricular function is  mildly reduced.  3. No significant valvular disease.  4. Unable to assess pulmonary artery pressure.  5. IVC diameter <2.1 cm collapsing >50% with sniff suggests a normal RA  pressure of 3 mmHg.                 Right sided filling pressures are moderately elevated.    Moderately elevated pulmonary artery hypertension.    Left sided filling pressures are mildly elevated.    Normal cardiac output level.     1. Normal coronary arteries.            Plan       Follow bedrest per protocol    Continued medical management and lifestyle modifications for cardiovascular risk factor optimizations.    Follow up with   Francisco J Edwards.    Discharge today per protocol   Coronary Findings     Diagnostic  Dominance: Right  Left Anterior Descending   First Diagonal Branch   The vessel is moderate in size.     Intervention     No interventions have been documented.  Hemodynamics     Hemodynamics Phase: Baseline    RA   A-wave: 16 mmHg   V-wave: 16 mmHg   Mean: 15 mmHg    HR: 88 bpm  RV   Systolic: 44 mmHg   End Diastolic: 16 mmHg   HR: 88 bpm    PA   Systolic:41 mmHg   Diasotlic: 24 mmHg   Mean: 31 mmHg   HR: 88 bpm   PA Sat: 70.8%    PCW   A-wave: 18 mmHg   V-wave: 17 mmHg   Mean: 16 mmHg   HR: 91 bpm    Cardiac Output   CO Jeannie: 5.72 L/min   CI Jeannie: 2.56 L/min/m2   CO TD:  Not performed   CI TD:  Not performed      Vitals   BP: 168 mmHg / 74 mmHg   SpO2: 92 %   PA Stat: 70.8 %    Resistance Metric   PVR index: 5.86 QUIROZ/m2             Answers for HPI/ROS submitted by the patient on 11/15/2021  General Symptoms: Yes  Skin Symptoms: Yes  HENT Symptoms: Yes  EYE SYMPTOMS: No  HEART SYMPTOMS: No  LUNG SYMPTOMS: Yes  INTESTINAL SYMPTOMS: No  URINARY SYMPTOMS: Yes  GYNECOLOGIC SYMPTOMS: No  BREAST SYMPTOMS: No  SKELETAL SYMPTOMS: Yes  BLOOD SYMPTOMS: No  NERVOUS SYSTEM SYMPTOMS: Yes  MENTAL HEALTH SYMPTOMS: Yes  Ear pain: No  Ear discharge: No  Hearing loss: No  Tinnitus: Yes  Nosebleeds: No  Congestion: No  Sinus pain: No  Trouble swallowing: No   Voice hoarseness: Yes  Mouth sores: No  Sore throat: No  Tooth pain: No  Gum tenderness: No  Bleeding gums: No  Change in taste: No  Change in sense of smell: No  Dry mouth: No  Hearing aid used: No  Neck lump: No  Fever: No  Loss of appetite: No  Weight loss: Yes  Weight gain: No  Fatigue: Yes  Night sweats: No  Chills: No  Increased stress: Yes  Excessive hunger: No  Excessive thirst: No  Feeling hot or cold when others believe the temperature is normal: No  Loss of height: No  Post-operative complications: No  Surgical site pain: No  Hallucinations: No  Change in or Loss of Energy:  Yes  Hyperactivity: No  Confusion: No  Changes in hair: No  Changes in moles/birth marks: No  Itching: Yes  Rashes: No  Changes in nails: No  Acne: No  Hair in places you don't want it: No  Change in facial hair: No  Warts: No  Non-healing sores: No  Scarring: No  Flaking of skin: No  Color changes of hands/feet in cold : No  Sun sensitivity: No  Skin thickening: No  Cough: Yes  Sputum or phlegm: No  Coughing up blood: No  Difficulty breating or shortness of breath: Yes  Snoring: No  Wheezing: Yes  Difficulty breathing on exertion: Yes  Nighttime Cough: No  Difficulty breathing when lying flat: No  Trouble holding urine or incontinence: Yes  Pain or burning: No  Trouble starting or stopping: No  Increased frequency of urination: Yes  Blood in urine: No  Decreased frequency of urination: No  Frequent nighttime urination: Yes  Flank pain: No  Difficulty emptying bladder: No  Back pain: Yes  Muscle aches: Yes  Neck pain: Yes  Swollen joints: No  Joint pain: Yes  Bone pain: No  Muscle cramps: No  Muscle weakness: Yes  Joint stiffness: Yes  Bone fracture: No  Trouble with coordination: Yes  Dizziness or trouble with balance: Yes  Fainting or black-out spells: No  Memory loss: No  Headache: Yes  Seizures: No  Speech problems: No  Tingling: Yes  Tremor: No  Weakness: Yes  Difficulty walking: Yes  Paralysis: No  Numbness: Yes  Nervous or Anxious: Yes  Depression: Yes  Trouble sleeping: Yes  Trouble thinking or concentrating: Yes  Mood changes: Yes  Panic attacks: Yes

## 2021-11-16 NOTE — NURSING NOTE
Chief Complaint   Patient presents with     Follow Up     PH 2 month follow up      Vitals were taken and medications were reconciled.   Eulalio Velarde, EMT  8:35 AM

## 2021-11-16 NOTE — NURSING NOTE
Pts plan of care per Francisco J Edwards MD:    Echocardiogram and Labs with Follow up in January.      Orders placed and pt marked ready for check out.  Message sent to Lori for follow up.  Gladis Stanley RN on 11/16/2021 at 10:00 AM

## 2021-11-16 NOTE — LETTER
11/16/2021      RE: Randi Cleary  [address] [facility] [address]       Dear Colleague,    Thank you for the opportunity to participate in the care of your patient, Randi Cleary, at the [facility] [place] [facility]. Please see a copy of my visit note below.    Problem List  1. Possible pulmonary hypertension  2. History of breast cancer              History of mastectomy              Breast reconstruction  3. Atherosclerosis with coronary calcifications  4. Hiatal hernia  5. Hepatic steatosis  6. Diverticulosis  7. KYAW with treatment  8. Psoriasis  9. Grave's disease with ablation  10. Hypertension  11. Asthma  12. Hemochromatosis,gene +  13. Bipolar disorder ?  14. Chronic pain with narcotic dependence  15. Pheochromocytoma (right surgically removed)  16. Elevated body mass index  17. Elevate hemoglobin with macrocytosis  18  Hiatal hernia  19. Chronic pain management  20. Cervical stenosis  21. COVID 19 infection       Plan:  1.Continue Edecrin for diuresis  2. Follows with Dr. Gregory in Sleep medicine - supposed to have a 3 month follow-up but does not appear to have been scheduled. Will route note to Dr. Gregory given persistent fatigue and seems to have difficulty tolerating her CPAP.   3. Repeat Echocardiogram  4. 6 minute walk test  5. Given that stress test was negative there is no contraindication from a cardiovascular perspective to proceed with spinal surgery   6. Follow-up visit with Dr. Edwards in February after completion of echo   7. Patient is vaccinated for COVID     Interval History:   She is frustrated today because she has been waiting for surgery on her neck which has been delayed several times. She had to change PCPs which has also been frustrating for her. Had a COPD exacerbation in October.     Has been struggling with managing her psychiatric conditions. Has been very depressed and having  "significant anger. Denies thoughts of self-harm.     Had COVID in June and feels that she has never really recovered from it. Reports having cough symptoms and chronic fatigue.     Has been feeling very SOB. Attributes it to weakness from her neck. Has not been significantly swelling. Sleeps in a recliner but lies partially flat. Denies chest pain. Has some sensation of palpitations. Reports feeling dizziness - lightheadedness like she might syncopize. She is unsteady on her feet which is attributes to the lightheadedness.     She reports sleeping better during the day than at night. Seems that compliance with her CPAP has been inconsistent.     Wt Readings from Last 5 Encounters:   11/16/21 98.6 kg (217 lb 4.8 oz)   11/01/21 99.5 kg (219 lb 6.4 oz)   10/21/21 98.9 kg (218 lb)   10/18/21 100.2 kg (221 lb)   10/14/21 101.6 kg (223 lb 14.4 oz)   /76 (BP Location: Right arm, Patient Position: Chair, Cuff Size: Adult Regular)   Pulse 85   Ht 1.676 m (5' 6\")   Wt 98.6 kg (217 lb 4.8 oz)   SpO2 93%   BMI 35.07 kg/m      General: NAD  HEENT: No LAD, no oral lesions  Pulmonary: CTAB  Cardiovascular: Normal S1 and S2, RRR, NMRG  Extremities: WWP, no edema  Neuro: A&Ox3, no focal deficits on gross examination      Meds  Current Outpatient Medications   Medication Instructions     albuterol (PROVENTIL) 2.5 mg, Nebulization, EVERY 4 HOURS PRN     Cholecalciferol (VITAMIN D3) 250 MCG (08778 UT) TABS 1 tablet, Oral, DAILY, 09268/day     Cyanocobalamin (VITAMIN B-12) 5000 MCG SUBL Unknown dose. 2 or 3 sprays/day     ethacrynic acid (EDECRIN) 25 MG tablet Take 1 tablet by mouth on Saturday and Wednesday     Fluticasone-Umeclidin-Vilanterol (TRELEGY ELLIPTA) 200-62.5-25 MCG/INH oral inhaler 1 puff, Inhalation, DAILY     HYDROcodone-acetaminophen (NORCO) 5-325 MG tablet 1 tablet, Oral, AT BEDTIME     losartan (COZAAR) 25 mg, Oral, DAILY     magnesium 250 MG tablet 1 tablet, Oral, 2 TIMES DAILY.     medical cannabis " (Patient's own supply) SEE ADMIN INSTRUCTIONS, (The purpose of this order is to document that the patient reports taking medical cannabis.  This is not a prescription, and is not used to certify that the patient has a qualifying medical condition.)     methocarbamol (ROBAXIN) 500 MG tablet TAKE 1 TABLET BY MOUTH AT BEDTIME AS NEEDED     Multiple Vitamins-Iron (MULTIVITAMIN PLUS IRON ADULT) TABS 4 tablets, Oral, DAILY     omeprazole (PRILOSEC) 20 MG DR capsule DAILY     OXcarbazepine (TRILEPTAL) 150 MG tablet One-half tab bedtime 3 nights, one-half tab twice a day 5 days, one twice a day     potassium chloride ER (KLOR-CON M) 20 MEQ CR tablet 20 mEq, Oral, DAILY     PREBIOTIC PRODUCT PO Oral, DAILY     rOPINIRole (REQUIP) 2 MG tablet No dose, route, or frequency recorded.     Synthroid 150 mcg, Oral, DAILY     Turmeric Curcumin 500 MG CAPS Oral     vitamin (B COMPLEX-C) tablet 1 tablet, Oral, DAILY     vitamin E 800 Units, Oral, DAILY       Labs  Results for RYAN ROBERTO GEETA RIVERA (MRN [id number]) as of 9/1/2021 11:58    Ref. Range 7/13/2021 16:30 7/13/2021 16:32 7/13/2021 16:33   Sodium Latest Ref Range: 133 - 144 mmol/L   142     Potassium Latest Ref Range: 3.4 - 5.3 mmol/L   3.6     Chloride Latest Ref Range: 94 - 109 mmol/L   109     Carbon Dioxide Latest Ref Range: 20 - 32 mmol/L   28     Urea Nitrogen Latest Ref Range: 7 - 30 mg/dL   7     Creatinine Latest Ref Range: 0.52 - 1.04 mg/dL   0.56     GFR Estimate Latest Ref Range: >60 mL/min/1.73m2   >90     Calcium Latest Ref Range: 8.5 - 10.1 mg/dL   8.7     Anion Gap Latest Ref Range: 3 - 14 mmol/L   5     Albumin Latest Ref Range: 3.4 - 5.0 g/dL   3.4     Protein Total Latest Ref Range: 6.8 - 8.8 g/dL   7.0     Bilirubin Total Latest Ref Range: 0.2 - 1.3 mg/dL   0.6     Alkaline Phosphatase Latest Ref Range: 40 - 150 U/L   73     ALT Latest Ref Range: 0 - 50 U/L   37     AST Latest Ref Range: 0 - 45 U/L   24     Erythropoietin Latest Ref Range: 4 - 27 mU/mL   12      Ferritin Latest Ref Range: 8 - 252 ng/mL   44     Iron Latest Ref Range: 35 - 180 ug/dL   165     Iron Binding Cap Latest Ref Range: 240 - 430 ug/dL   265     Iron Saturation Index Latest Ref Range: 15 - 46 %   62 (H)     Lactate Dehydrogenase Latest Ref Range: 81 - 234 U/L     256 (H)   Glucose Latest Ref Range: 70 - 99 mg/dL   92     FIO2 Unknown 0       Ph Venous Latest Ref Range: 7.32 - 7.43  7.40       PCO2 Venous Latest Ref Range: 40 - 50 mm Hg 46       PO2 Venous Latest Ref Range: 25 - 47 mm Hg 31       Bicarbonate Venous Latest Ref Range: 21 - 28 mmol/L 28       Base Excess Venous Latest Ref Range: -7.7 - 1.9 mmol/L 2.8 (H)       WBC Latest Ref Range: 4.0 - 11.0 10e3/uL   7.0     Hemoglobin Latest Ref Range: 11.7 - 15.7 g/dL   15.8 (H)     Hematocrit Latest Ref Range: 35.0 - 47.0 %   44.8     Platelet Count Latest Ref Range: 150 - 450 10e3/uL   194     RBC Count Latest Ref Range: 3.80 - 5.20 10e6/uL   4.62     MCV Latest Ref Range: 78 - 100 fL   97     MCH Latest Ref Range: 26.5 - 33.0 pg   34.2 (H)     MCHC Latest Ref Range: 31.5 - 36.5 g/dL   35.3     RDW Latest Ref Range: 10.0 - 15.0 %   12.3     % Neutrophils Latest Units: %   69     % Lymphocytes Latest Units: %   20     % Monocytes Latest Units: %   8     % Eosinophils Latest Units: %   2     Absolute Basophils Latest Ref Range: 0.0 - 0.2 10e3/uL   0.0     % Basophils Latest Units: %   1     Absolute Eosinophils Latest Ref Range: 0.0 - 0.7 10e3/uL   0.2     Absolute Immature Granulocytes Latest Ref Range: <=0.0 10e3/uL   0.0     Absolute Lymphocytes Latest Ref Range: 0.8 - 5.3 10e3/uL   1.4     Absolute Monocytes Latest Ref Range: 0.0 - 1.3 10e3/uL   0.5     % Immature Granulocytes Latest Units: %   0     Absolute Neutrophils Latest Ref Range: 1.6 - 8.3 10e3/uL   4.8     Absolute NRBCs Latest Units: 10e3/uL   0.0     NRBCs per 100 WBC Latest Ref Range: <1 /100   0     % Retic Latest Ref Range: 0.5 - 2.0 %     2.4 (H)   Absolute Retic Latest Ref  Range: 0.025 - 0.095 10e6/uL     0.112 (H)   Sed Rate Latest Ref Range: 0 - 30 mm/hr     9   Interpretation Unknown     This result conta...            Imaging      Right Heart Pressures      2019    RA  A-wave: 16 mmHg  V-wave: 16 mmHg  Mean: 15 mmHg   HR: 88 bpm  RV  Systolic: 44 mmHg  End Diastolic: 16 mmHg  HR: 88 bpm    PA  Systolic:41 mmHg  Diasotlic: 24 mmHg  Mean: 31 mmHg  HR: 88 bpm  PA Sat: 70.8%    PCW  A-wave: 18 mmHg  V-wave: 17 mmHg  Mean: 16 mmHg  HR: 91 bpm    Cardiac Output  CO Jeannie: 5.72 L/min  CI Jeannie: 2.56 L/min/m2  CO TD: Not performed  CI TD: Not performed     Vitals  BP: 168 mmHg / 74 mmHg  SpO2: 92 %  PA Stat: 70.8 %           Echocardiogram           Name: ERIK BOSS  MRN: [id number]  : 1955  Study Date: 2019 03:04 PM  Age: 64 yrs  Gender: Female  Patient [facility]: [facility]  Reason For Study: Pulmonary hypertension (H)  Ordering Physician: MINO CORTES  Referring Physician: MINO CORTES  Performed By: Siobhan Dee ASHTYN     BSA: 2.2 m2  Height: 66 in  Weight: 259 lb  BP: 118/76 mmHg  _____________________________________________________________________________  __        Procedure  Echocardiogram with two-dimensional, color and spectral Doppler performed.  Poor acoustic windows. Optison (NDC #1172-2134-13) given intravenously.  Patient was given 3.0 ml mixture of 3 ml Optison and 6 ml saline. 6.0 ml  wasted. IV start location R Hand .  _____________________________________________________________________________  __        Interpretation Summary  1. Global and regional left ventricular function is normal with an EF of 55-  60%.  2. The right ventricle is normal size. Global right ventricular function is  mildly reduced.  3. No significant valvular disease.  4. Unable to assess pulmonary artery pressure.  5. IVC diameter <2.1 cm collapsing >50% with sniff suggests a normal RA  pressure of 3 mmHg.                 Right sided filling pressures are  moderately elevated.    Moderately elevated pulmonary artery hypertension.    Left sided filling pressures are mildly elevated.    Normal cardiac output level.     1. Normal coronary arteries.            Plan       Follow bedrest per protocol    Continued medical management and lifestyle modifications for cardiovascular risk factor optimizations.    Follow up with Dr. Francisco J Edwards.    Discharge today per protocol   Coronary Findings     Diagnostic  Dominance: Right  Left Anterior Descending   First Diagonal Branch   The vessel is moderate in size.     Intervention     No interventions have been documented.  Hemodynamics     Hemodynamics Phase: Baseline    RA   A-wave: 16 mmHg   V-wave: 16 mmHg   Mean: 15 mmHg    HR: 88 bpm  RV   Systolic: 44 mmHg   End Diastolic: 16 mmHg   HR: 88 bpm    PA   Systolic:41 mmHg   Diasotlic: 24 mmHg   Mean: 31 mmHg   HR: 88 bpm   PA Sat: 70.8%    PCW   A-wave: 18 mmHg   V-wave: 17 mmHg   Mean: 16 mmHg   HR: 91 bpm    Cardiac Output   CO Jeannie: 5.72 L/min   CI Jeannie: 2.56 L/min/m2   CO TD:  Not performed   CI TD:  Not performed      Vitals   BP: 168 mmHg / 74 mmHg   SpO2: 92 %   PA Stat: 70.8 %    Resistance Metric   PVR index: 5.86 QUIROZ/m2         Please do not hesitate to contact me if you have any questions/concerns.     Sincerely,     Francisco J Edwards MD

## 2021-11-18 ENCOUNTER — VIRTUAL VISIT (OUTPATIENT)
Dept: PSYCHOLOGY | Facility: CLINIC | Age: 66
End: 2021-11-18
Payer: MEDICARE

## 2021-11-18 DIAGNOSIS — F41.1 GAD (GENERALIZED ANXIETY DISORDER): ICD-10-CM

## 2021-11-18 DIAGNOSIS — F31.81 BIPOLAR 2 DISORDER (H): Primary | ICD-10-CM

## 2021-11-18 PROCEDURE — 90834 PSYTX W PT 45 MINUTES: CPT | Mod: 95 | Performed by: SOCIAL WORKER

## 2021-11-18 NOTE — PROGRESS NOTES
Progress Note    Patient Name: Randi Cleary  Date: 11/18/21         Service Type: Individual      Session Start Time: 2:05 PM  Session End Time: 2:50 PM     Session Length: 45 minutes     Session #: 66    Attendees: Client attended alone    Service Modality:  Video Visit:    Telemedicine Visit: The patient's condition can be safely assessed and treated via synchronous audio and visual telemedicine encounter.      Reason for Telemedicine Visit: Services only offered telehealth    Originating Site (Patient Location): Patient's home    Distant Site (Provider Location): Provider Remote Setting- Home Office    Consent:  The patient/guardian has verbally consented to: the potential risks and benefits of telemedicine (video visit) versus in person care; bill my insurance or make self-payment for services provided; and responsibility for payment of non-covered services.     Mode of Communication:  Video Conference via PingStamp    As the provider I attest to compliance with applicable laws and regulations related to telemedicine.      Treatment Plan Last Reviewed: 9/14/21  PHQ-9 / CHAVO-7 :   PHQ 11/1/2021 11/3/2021 11/15/2021   PHQ-9 Total Score 13 14 12   Q9: Thoughts of better off dead/self-harm past 2 weeks Not at all Not at all Not at all   F/U: Thoughts of suicide or self-harm - - -   F/U: Self harm-plan - - -   F/U: Self-harm action - - -   F/U: Safety concerns - - -   Some encounter information is confidential and restricted. Go to Review Flowsheets activity to see all data.     CHAVO-7 SCORE 10/12/2021 10/26/2021 11/15/2021   Total Score 15 (severe anxiety) 12 (moderate anxiety) 12 (moderate anxiety)   Total Score 15 12 12   Some encounter information is confidential and restricted. Go to Review Flowsheets activity to see all data.         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 She has a conflictual relationship with her .  She is getting minimal physical activity.  She had recent eye surgery     Treatment Objective(s) Addressed in This Session:   Patient will increase frequency of engaging her in ADLs.  Patient will track and record at least 5 pleasant exchanges with . Patient will be able to identify at least 5 positive traits about her .  Patient will reduce level of depressive and anxious features as evidenced by reduction in score on her CHAVO-7 and PHQ-9 (scores of 15 and 16 at first measurment, respectively).     Intervention:   Supportive Therapy: Patient thinks she has figured out what is causing some of her mood changes, after having learned that the spinal problem she has cause cause irritation, anxiety, and depression symptoms.  She is anxious to have the surgery and hope this resolves things as she is embarrassed by some of her reactions recently.      ASSESSMENT: Current Emotional / Mental Status (status of significant symptoms):   Risk status (Self / Other harm or suicidal ideation)   Patient denies current fears or concerns for personal safety.   Patient denies current or recent suicidal ideation or behaviors.   Patient denies current or recent homicidal ideation or behaviors.   Patient denies  current or recent self injurious behavior or ideation.   Patient denies other safety concerns.   Patient reports there has been no change in risk factors since their last session.     Patient reports there has been no change in protective factors since their last session.     A safety and risk management plan has been developed including: Patient consented to co-developed safety plan.  Safety and risk management plan was completed.  Patient agreed to use safety plan should any safety concerns arise.  A copy was given to the patient. This was done on 11/24/2020 and is attached to the bottom of the note.     Appearance:   Appropriate    Eye Contact:   Fair    Psychomotor Behavior: Normal    Attitude:   Cooperative    Orientation:   All   Speech    Rate / Production: Normal/ Responsive    Volume:  Normal    Mood:    Anxious  Sad    Affect:    Appropriate    Thought Content:  Clear    Thought Form:  Coherent    Insight:    Fair      Medication Review:   No changes to current psychiatric medication(s)      Medication Compliance:   Yes     Changes in Health Issues:   None reported     Chemical Use Review:   Substance Use: decrease in use.  Patient reports frequency of use none since the third week of August.  Provided encouragement towards sobriety        Tobacco Use: No current tobacco use.      Diagnosis:  1. Bipolar 2 disorder (H)    2. 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  Choose not to watch the news  Make an appointment with [place]  Consider making jewelry or art     Notice when anger comes up  Be kinder to others and to your own parts  Pay attention to physical sensations when having feelings    Continue to engage in behavioral activation, 10 minutes a day  Get back to the pool    Continue IFS, work on building crust      MICAELA SLADE    November 18, 2021                               "                           ______________________________________________________________________    Treatment Plan    Patient's Name: Randi Cleary  YOB: 1955    Date: 21    DSM5 Diagnoses: 296.32 (F33.1) Major Depressive Disorder, Recurrent Episode, Moderate With anxious distress  Psychosocial / Contextual Factors: Patient's entire family of origin has , she now has a sister-in-law and  as support.  Relationship with  is conflictual.  Patient is reporting increase in physical symptoms due to COVID-19.  Patient is off of pain medications.  WHODAS: 42    Referral / Collaboration:  Referral to another professional/service is not indicated at this time.     Anticipated number of session or this episode of care: 12-15      MeasurableTreatment Goal(s) related to diagnosis / functional impairment(s)  Goal 1: Patient will \"jumpstart, getting going with the things I need to be doing around the house as far as picking up, doing things, trying to do something every day.  Also to lessen the animosity between me and my .\"    I will know I've met my goal when my shoulders are fixed and I can see.      Objective #A (Patient Action)    Patient will increase frequency of engaging her in ADLs.  Status: Continued - Date(s): 21    Intervention(s)  Therapist will engage patient in CBT, specifically behavioral activation.    Objective #B  Patient will track and record at least 5 pleasant exchanges with . Patient will be able to identify at least 5 positive traits about her  and how he relates to her.  Status: Continued - Date(s): 21    Intervention(s)  Therapist will teach assertiveness skills and assign homework related to relationship interactions.    Objective #C  Patient will reduce level of depressive and anxious features as evidenced by reduction in score on her CHAVO-7 and PHQ-9 (scores of 15 and 16 at first measurment, respectively).  Status: Continued - " "Date(s):  9/14/21    Intervention(s)  Therapist will engage patient in person-centered therapy and CBT.    Patient has reviewed and agreed to the above plan.      MICAELA SLADE  September 14, 2021                                                Randi Cleary          SAFETY PLAN:  Step 1: Warning signs / cues (Thoughts, images, mood, situation, behavior) that a crisis may be developing:  ? Thoughts: \"I don't want to continue\" \"I am unwanted\"  ? Images: none  ? Thinking Processes: ruminating  ? Mood: anger  ? Behaviors: isolating/withdrawing , can't stop crying, not taking care of myself and not taking care of my responsibilities  ? Situations: small triggers, such as not being able to find something, or dropping something   Step 2: Coping strategies - Things I can do to take my mind off of my problems without contacting another person (relaxation technique, physical activity):  ? Distress Tolerance Strategies:  arts and crafts: drawing, play with my pet , listen to positive and upbeat music: any, change body temperature (ice pack/cold water)  and paced breathing/progressive muscle relaxation  ? Physical Activities: go for a walk, deep breathing and stretching   ? Focus on helpful thoughts:  \"You've been through this before, you can get through it again.\"  Step 3: People and social settings that provide distraction:                 Name: Carmen                            Name: Darien                           Name: Aleida       ? pool, shopping, Carmen's house, [employer]       Step 4: Remind myself of people and things that are important to me and worth living for:  Clifford Little Donna, post-COVID world, options of what could be in your future        Step 5: When I am in crisis, I can ask these people to help me use my safety plan:                 Name: Sidney  Step 6: Making the environment safe:   ? go to sleep/daydream  Step 7: Professionals or agencies I can contact during a crisis:  ? [facility] " Daytime Number: [phone]  ? Suicide Prevention Lifeline: 1-143-468-TALK (3562)  ? Crisis Text Line Service (available 24 hours a day, 7 days a week): Text [place] to 237629    Local Crisis Services: [facility] Crisis: [phone]     Call 911 or go to my nearest emergency department.       I helped develop this safety plan and agree to use it when needed.  I have been given a copy of this plan.       Client signature _________________________________________________________________  Today s date:  11/24/2020  Adapted from Safety Plan Template 2008 Randi Poole and Robby Barba is reprinted with the express permission of the authors.  No portion of the Safety Plan Template may be reproduced without the express, written permission.  You can contact the authors at [email or web address] or [email or web address].

## 2021-11-19 DIAGNOSIS — M25.50 PAIN IN JOINT, MULTIPLE SITES: ICD-10-CM

## 2021-11-19 RX ORDER — HYDROCODONE BITARTRATE AND ACETAMINOPHEN 5; 325 MG/1; MG/1
1 TABLET ORAL AT BEDTIME
Qty: 10 TABLET | Refills: 0 | Status: SHIPPED | OUTPATIENT
Start: 2021-11-22 | End: 2021-11-30

## 2021-11-19 NOTE — TELEPHONE ENCOUNTER
Received call from patient requesting refill(s) of Norco     Last dispensed from pharmacy on 11/12/21    Patient's last office/virtual visit by prescribing provider on 10/21/21  Next office/virtual appointment scheduled for 12/22/21    Last urine drug screen date 9/2020  Current opioid agreement on file (completed within the last year) No Date of opioid agreement: 9/2020    E-prescribe to [facility] pharmacy  Pending Prescriptions:                       Disp   Refills    HYDROcodone-acetaminophen (NORCO) 5-325 M*10 tab*0            Sig: Take 1 tablet by mouth At Bedtime for 10 days      Patient left a message stating she needs a refill of Hydrocodone and that the surgery is going to be rescheduled and the plan is for her to have the surgery before An.

## 2021-11-20 PROBLEM — E66.01 MORBID OBESITY (H): Status: ACTIVE | Noted: 2021-11-20

## 2021-11-22 ENCOUNTER — PATIENT OUTREACH (OUTPATIENT)
Dept: NURSING | Facility: CLINIC | Age: 66
End: 2021-11-22
Payer: MEDICARE

## 2021-11-22 ENCOUNTER — PATIENT OUTREACH (OUTPATIENT)
Dept: CARE COORDINATION | Facility: CLINIC | Age: 66
End: 2021-11-22

## 2021-11-22 ENCOUNTER — MYC MEDICAL ADVICE (OUTPATIENT)
Dept: CARE COORDINATION | Facility: CLINIC | Age: 66
End: 2021-11-22

## 2021-11-22 ENCOUNTER — MYC MEDICAL ADVICE (OUTPATIENT)
Dept: PULMONOLOGY | Facility: OTHER | Age: 66
End: 2021-11-22
Payer: MEDICARE

## 2021-11-22 ENCOUNTER — MYC MEDICAL ADVICE (OUTPATIENT)
Dept: NEUROSURGERY | Facility: CLINIC | Age: 66
End: 2021-11-22
Payer: MEDICARE

## 2021-11-22 DIAGNOSIS — M54.12 CERVICAL RADICULOPATHY: Primary | ICD-10-CM

## 2021-11-22 DIAGNOSIS — J44.9 CHRONIC OBSTRUCTIVE PULMONARY DISEASE, UNSPECIFIED COPD TYPE (H): Primary | ICD-10-CM

## 2021-11-22 DIAGNOSIS — J44.9 CHRONIC OBSTRUCTIVE PULMONARY DISEASE, UNSPECIFIED COPD TYPE (H): ICD-10-CM

## 2021-11-22 RX ORDER — METHOCARBAMOL 500 MG/1
TABLET, FILM COATED ORAL
Qty: 60 TABLET | Refills: 0 | Status: ON HOLD | OUTPATIENT
Start: 2021-11-22 | End: 2021-12-25

## 2021-11-22 RX ORDER — ALBUTEROL SULFATE 90 UG/1
2 AEROSOL, METERED RESPIRATORY (INHALATION) EVERY 6 HOURS
Qty: 11 G | Refills: 11 | Status: SHIPPED | OUTPATIENT
Start: 2021-11-22 | End: 2021-11-22

## 2021-11-22 RX ORDER — ALBUTEROL SULFATE 90 UG/1
2 AEROSOL, METERED RESPIRATORY (INHALATION) EVERY 6 HOURS
Qty: 11 G | Refills: 11 | Status: SHIPPED | OUTPATIENT
Start: 2021-11-22 | End: 2021-12-07

## 2021-11-22 NOTE — PROGRESS NOTES
Clinic Care Coordination Contact  Community Health Worker Follow Up      Care Gaps:     Focus is on her upcoming surgery and she will follow up with her PCP to address her Care Gaps with her next visit.        Goals:     Goals Addressed                    This Visit's Progress       COMPLETED: Mental Health Management (pt-stated)   100%      Goal Statement: I would like to have my depression under better control in the next 6 months.     Date Goal set: 12/29/20   Barriers: Long history of depression   Strengths: Strong advocate for herself   Date to Achieve By: 5/29/21   Patient expressed understanding of goal: Yes     Personal Plan:  1. I will continue to attend all scheduled appointments with Dr. Dubois and my PCP and will follow recommendations.   2. I will take my medications ordered by Dr. Dubois as prescribed.   3. I will continue to attend all scheduled appointments with my therapist, Corine.   4. I have returned to swimming at the [employer] as I know this helps me both physically and mentally.   5. I will continue to participate in activities that I enjoy and keeps me busy at home.                 Intervention and Education during outreach:     Winnie reports that her primary focus is on getting a bed so she can have surgery. She states she does not feel she needs any further follow-up from [facility] anymore. She states she has MyChart and is well connected to providers who support her mental health.    Winnie states she would like a [employer] message sent to her today with her new CHW's contact information in case anything comes up between now and 2 months from now. She states she is okay with a 2 month outreach call from the new CHW.        CHW Next Follow-up: 2 months    CHW Plan: Routing to the [facility] SW to review for Maintenance.

## 2021-11-22 NOTE — PROGRESS NOTES
Clinic Care Coordination Contact    Assessment: CHW contacted patient for follow up.  Patient has continued to follow the plan of care and assessment is negative for any new needs or concerns.    Enrollment status: Maintenance     Plan: CHW set outreach for 2 months.  If no new needs identified at next outreach send chart to CC to review for graduation. Letter sent via AskYou with contact information, per pt request.

## 2021-11-22 NOTE — LETTER
[facility]ORDIN[facility]  [address]  [facility] [address]    November 22, 2021    Randi Cleary  [address] [facility] [address]      Dear Randi,    I am a clinic care coordinator who works with [employer]. I wanted to introduce myself and provide you with my contact information for you to be able to call me with any questions or concerns. Below is a description of clinic care coordination and how I can further assist you.      The clinic care coordination team is made up of a registered nurse,  and community health worker who understand the health care system. The goal of clinic care coordination is to help you manage your health and improve access to the health care system in the most efficient manner. The team can assist you in meeting your health care goals by providing education, coordinating services, strengthening the communication among your providers and supporting you with any resource needs.    Please feel free to contact Kitty at [phone] with any questions or concerns. We are focused on providing you with the highest-quality healthcare experience possible and that all starts with you.     Sincerely,     Care Coordination Team

## 2021-11-23 ENCOUNTER — VIRTUAL VISIT (OUTPATIENT)
Dept: PSYCHOLOGY | Facility: CLINIC | Age: 66
End: 2021-11-23
Payer: MEDICARE

## 2021-11-23 DIAGNOSIS — F31.81 BIPOLAR 2 DISORDER (H): Primary | ICD-10-CM

## 2021-11-23 DIAGNOSIS — F41.1 GAD (GENERALIZED ANXIETY DISORDER): ICD-10-CM

## 2021-11-23 PROCEDURE — 90834 PSYTX W PT 45 MINUTES: CPT | Mod: 95 | Performed by: SOCIAL WORKER

## 2021-11-23 NOTE — PROGRESS NOTES
Progress Note    Patient Name: Randi Cleary  Date: 11/23/21         Service Type: Individual      Session Start Time: 2:03 PM  Session End Time: 2:55 PM     Session Length: 52 minutes     Session #: 67    Attendees: Client attended alone    Service Modality:  Video Visit:    Telemedicine Visit: The patient's condition can be safely assessed and treated via synchronous audio and visual telemedicine encounter.      Reason for Telemedicine Visit: Services only offered telehealth    Originating Site (Patient Location): Patient's home    Distant Site (Provider Location): Provider Remote Setting- Home Office    Consent:  The patient/guardian has verbally consented to: the potential risks and benefits of telemedicine (video visit) versus in person care; bill my insurance or make self-payment for services provided; and responsibility for payment of non-covered services.     Mode of Communication:  Video Conference via [employer]    As the provider I attest to compliance with applicable laws and regulations related to telemedicine.      Treatment Plan Last Reviewed: 9/14/21  PHQ-9 / CHAVO-7 :   PHQ 11/1/2021 11/3/2021 11/15/2021   PHQ-9 Total Score 13 14 12   Q9: Thoughts of better off dead/self-harm past 2 weeks Not at all Not at all Not at all   F/U: Thoughts of suicide or self-harm - - -   F/U: Self harm-plan - - -   F/U: Self-harm action - - -   F/U: Safety concerns - - -   Some encounter information is confidential and restricted. Go to Review Flowsheets activity to see all data.     CHAVO-7 SCORE 10/12/2021 10/26/2021 11/15/2021   Total Score 15 (severe anxiety) 12 (moderate anxiety) 12 (moderate anxiety)   Total Score 15 12 12   Some encounter information is confidential and restricted. Go to Review Flowsheets activity to see all data.         DATA  Extended Session (53+ minutes): No  Interactive Complexity: No  Crisis: No      Progress Since Last Session (Related to Symptoms /  Goals / Homework):   Symptoms: physical health concerns that negatively are impacting her mood     Homework: Partially completed  Move plant stands today  Choose not to watch the news  Make an appointment with Shayne  Consider making jewelry or art     Notice when anger comes up  Be kinder to others and to your own parts  Pay attention to physical sensations when having feelings    Continue to engage in behavioral activation, 10 minutes a day  Get back to the pool    Continue IFS, work on building crust     Episode of Care Goals: Minimal progress - ACTION (Actively working towards change); Intervened by reinforcing change plan / affirming steps taken     Current / Ongoing Stressors and Concerns:   Patient is currently socially isolated. She has a conflictual relationship with her .  She is getting minimal physical activity.  She had recent eye surgery     Treatment Objective(s) Addressed in This Session:   Patient will increase frequency of engaging her in ADLs.  Patient will track and record at least 5 pleasant exchanges with . Patient will be able to identify at least 5 positive traits about her .  Patient will reduce level of depressive and anxious features as evidenced by reduction in score on her CHAVO-7 and PHQ-9 (scores of 15 and 16 at first measurment, respectively).     Intervention:   Supportive Therapy: Patient shared about physical health concerns and how the were weighing on her recently. Identified how they've impacted her and what her next steps are. Helped patient identify areas of hope.    ASSESSMENT: Current Emotional / Mental Status (status of significant symptoms):   Risk status (Self / Other harm or suicidal ideation)   Patient denies current fears or concerns for personal safety.   Patient denies current or recent suicidal ideation or behaviors.   Patient denies current or recent homicidal ideation or behaviors.   Patient denies current or recent self injurious behavior or  ideation.   Patient denies other safety concerns.   Patient reports there has been no change in risk factors since their last session.     Patient reports there has been no change in protective factors since their last session.     A safety and risk management plan has been developed including: Patient consented to co-developed safety plan.  Safety and risk management plan was completed.  Patient agreed to use safety plan should any safety concerns arise.  A copy was given to the patient. This was done on 11/24/2020 and is attached to the bottom of the note.     Appearance:   Appropriate    Eye Contact:   Fair    Psychomotor Behavior: Normal    Attitude:   Cooperative    Orientation:   All   Speech    Rate / Production: Normal/ Responsive    Volume:  Normal    Mood:    Anxious  Sad    Affect:    Appropriate    Thought Content:  Clear    Thought Form:  Coherent    Insight:    Fair      Medication Review:   No changes to current psychiatric medication(s)      Medication Compliance:   Yes     Changes in Health Issues:   None reported     Chemical Use Review:   Substance Use: decrease in use.  Patient reports frequency of use none since the third week of August.  Provided encouragement towards sobriety        Tobacco Use: No current tobacco use.      Diagnosis:  1. Bipolar 2 disorder (H)    2. CHAVO (generalized anxiety disorder)        Collateral Reports Completed:   Not Applicable    PLAN: (Patient Tasks / Therapist Tasks / Other)  Choose not to watch the news  Make an appointment with Shayne  Consider making jewelry or art     Notice when anger comes up  Be kinder to others and to your own parts  Pay attention to physical sensations when having feelings    Continue to engage in behavioral activation, 10 minutes a day  Get back to the pool if it feels safe    Continue IFS, work on building crust      MICAELA SLADE    November 23, 2021                                                      "    ______________________________________________________________________    Treatment Plan    Patient's Name: Randi Cleary  YOB: 1955    Date: 19    DSM5 Diagnoses: 296.32 (F33.1) Major Depressive Disorder, Recurrent Episode, Moderate With anxious distress  Psychosocial / Contextual Factors: Patient's entire family of origin has , she now has a sister-in-law and  as support.  Relationship with  is conflictual.  Patient is reporting increase in physical symptoms due to COVID-19.  Patient is off of pain medications.  WHODAS: 42    Referral / Collaboration:  Referral to another professional/service is not indicated at this time.     Anticipated number of session or this episode of care: 12-15      MeasurableTreatment Goal(s) related to diagnosis / functional impairment(s)  Goal 1: Patient will \"jumpstart, getting going with the things I need to be doing around the house as far as picking up, doing things, trying to do something every day.  Also to lessen the animosity between me and my .\"    I will know I've met my goal when my shoulders are fixed and I can see.      Objective #A (Patient Action)    Patient will increase frequency of engaging her in ADLs.  Status: Continued - Date(s): 21    Intervention(s)  Therapist will engage patient in CBT, specifically behavioral activation.    Objective #B  Patient will track and record at least 5 pleasant exchanges with . Patient will be able to identify at least 5 positive traits about her  and how he relates to her.  Status: Continued - Date(s): 21    Intervention(s)  Therapist will teach assertiveness skills and assign homework related to relationship interactions.    Objective #C  Patient will reduce level of depressive and anxious features as evidenced by reduction in score on her CHAVO-7 and PHQ-9 (scores of 15 and 16 at first measurment, respectively).  Status: Continued - Date(s):  " "9/14/21    Intervention(s)  Therapist will engage patient in person-centered therapy and CBT.    Patient has reviewed and agreed to the above plan.      MICAELA SLADE  September 14, 2021                                                Randi Cleary          SAFETY PLAN:  Step 1: Warning signs / cues (Thoughts, images, mood, situation, behavior) that a crisis may be developing:  ? Thoughts: \"I don't want to continue\" \"I am unwanted\"  ? Images: none  ? Thinking Processes: ruminating  ? Mood: anger  ? Behaviors: isolating/withdrawing , can't stop crying, not taking care of myself and not taking care of my responsibilities  ? Situations: small triggers, such as not being able to find something, or dropping something   Step 2: Coping strategies - Things I can do to take my mind off of my problems without contacting another person (relaxation technique, physical activity):  ? Distress Tolerance Strategies:  arts and crafts: drawing, play with my pet , listen to positive and upbeat music: any, change body temperature (ice pack/cold water)  and paced breathing/progressive muscle relaxation  ? Physical Activities: go for a walk, deep breathing and stretching   ? Focus on helpful thoughts:  \"You've been through this before, you can get through it again.\"  Step 3: People and social settings that provide distraction:                 Name: Carmen                            Name: Darien                           Name: Aleida       ? pool, shopping, Carmen's house, [employer]       Step 4: Remind myself of people and things that are important to me and worth living for:  Sidney, Aleida Horton, post-COVID world, options of what could be in your future        Step 5: When I am in crisis, I can ask these people to help me use my safety plan:                 Name: Sidney  Step 6: Making the environment safe:   ? go to sleep/daydream  Step 7: Professionals or agencies I can contact during a crisis:  ? [facility] Daytime " Number: [phone]  ? Suicide Prevention Lifeline: 9-273-252-TALK (8325)  ? Crisis Text Line Service (available 24 hours a day, 7 days a week): Text [place] to 561272    Local Crisis Services: [facility] Crisis: [phone]     Call 911 or go to my nearest emergency department.       I helped develop this safety plan and agree to use it when needed.  I have been given a copy of this plan.       Client signature _________________________________________________________________  Today s date:  11/24/2020  Adapted from Safety Plan Template 2008 Randi Poole and Robby Barba is reprinted with the express permission of the authors.  No portion of the Safety Plan Template may be reproduced without the express, written permission.  You can contact the authors at [email or web address] or [email or web address].

## 2021-11-30 ENCOUNTER — TELEPHONE (OUTPATIENT)
Dept: PALLIATIVE MEDICINE | Facility: OTHER | Age: 66
End: 2021-11-30
Payer: MEDICARE

## 2021-11-30 DIAGNOSIS — M25.50 PAIN IN JOINT, MULTIPLE SITES: ICD-10-CM

## 2021-11-30 RX ORDER — HYDROCODONE BITARTRATE AND ACETAMINOPHEN 5; 325 MG/1; MG/1
1 TABLET ORAL AT BEDTIME
Qty: 10 TABLET | Refills: 0 | Status: SHIPPED | OUTPATIENT
Start: 2021-12-02 | End: 2021-12-10

## 2021-11-30 NOTE — TELEPHONE ENCOUNTER
Received call from patient requesting refill(s) of Norco    Last dispensed from pharmacy on 11/21/21    Patient's last office/virtual visit by prescribing provider on 10/21/21  Next office/virtual appointment scheduled for 12/22/221    Last urine drug screen date 9/2020  Current opioid agreement on file (completed within the last year) No Date of opioid agreement: 9/2020    E-prescribe to [facility] pharmacy  Pending Prescriptions:                       Disp   Refills    HYDROcodone-acetaminophen (NORCO) 5-325 M*10 tab*0            Sig: Take 1 tablet by mouth At Bedtime For chronic           pain

## 2021-11-30 NOTE — TELEPHONE ENCOUNTER
Patient calls, VM reporting the current RX for hydrocodone will need additional notes stating for chronic pain or pharmacy cannot fill full quantity and would have to fill only a 7 day quantity.

## 2021-12-01 ENCOUNTER — PREP FOR PROCEDURE (OUTPATIENT)
Dept: NEUROSURGERY | Facility: CLINIC | Age: 66
End: 2021-12-01
Payer: MEDICARE

## 2021-12-01 DIAGNOSIS — M48.02 SPINAL STENOSIS IN CERVICAL REGION: Primary | ICD-10-CM

## 2021-12-01 DIAGNOSIS — M54.12 CERVICAL RADICULOPATHY: ICD-10-CM

## 2021-12-02 ENCOUNTER — VIRTUAL VISIT (OUTPATIENT)
Dept: PSYCHOLOGY | Facility: CLINIC | Age: 66
End: 2021-12-02
Payer: MEDICARE

## 2021-12-02 DIAGNOSIS — F41.1 GAD (GENERALIZED ANXIETY DISORDER): ICD-10-CM

## 2021-12-02 DIAGNOSIS — F31.81 BIPOLAR 2 DISORDER (H): Primary | ICD-10-CM

## 2021-12-02 DIAGNOSIS — Z11.59 ENCOUNTER FOR SCREENING FOR OTHER VIRAL DISEASES: ICD-10-CM

## 2021-12-02 PROCEDURE — 90834 PSYTX W PT 45 MINUTES: CPT | Mod: 95 | Performed by: SOCIAL WORKER

## 2021-12-02 ASSESSMENT — ANXIETY QUESTIONNAIRES
GAD7 TOTAL SCORE: 15
GAD7 TOTAL SCORE: 15
5. BEING SO RESTLESS THAT IT IS HARD TO SIT STILL: SEVERAL DAYS
GAD7 TOTAL SCORE: 15
2. NOT BEING ABLE TO STOP OR CONTROL WORRYING: MORE THAN HALF THE DAYS
7. FEELING AFRAID AS IF SOMETHING AWFUL MIGHT HAPPEN: SEVERAL DAYS
4. TROUBLE RELAXING: NEARLY EVERY DAY
3. WORRYING TOO MUCH ABOUT DIFFERENT THINGS: MORE THAN HALF THE DAYS
7. FEELING AFRAID AS IF SOMETHING AWFUL MIGHT HAPPEN: SEVERAL DAYS
6. BECOMING EASILY ANNOYED OR IRRITABLE: NEARLY EVERY DAY
1. FEELING NERVOUS, ANXIOUS, OR ON EDGE: NEARLY EVERY DAY
8. IF YOU CHECKED OFF ANY PROBLEMS, HOW DIFFICULT HAVE THESE MADE IT FOR YOU TO DO YOUR WORK, TAKE CARE OF THINGS AT HOME, OR GET ALONG WITH OTHER PEOPLE?: VERY DIFFICULT

## 2021-12-02 ASSESSMENT — PATIENT HEALTH QUESTIONNAIRE - PHQ9
SUM OF ALL RESPONSES TO PHQ QUESTIONS 1-9: 13
10. IF YOU CHECKED OFF ANY PROBLEMS, HOW DIFFICULT HAVE THESE PROBLEMS MADE IT FOR YOU TO DO YOUR WORK, TAKE CARE OF THINGS AT HOME, OR GET ALONG WITH OTHER PEOPLE: VERY DIFFICULT
SUM OF ALL RESPONSES TO PHQ QUESTIONS 1-9: 13

## 2021-12-02 NOTE — PROGRESS NOTES
Progress Note    Patient Name: Randi Cleary  Date: 12/2/21         Service Type: Individual      Session Start Time: 2:05 PM  Session End Time: 2:49 PM     Session Length: 44 minutes     Session #: 68    Attendees: Client attended alone    Service Modality:  Video Visit:    Telemedicine Visit: The patient's condition can be safely assessed and treated via synchronous audio and visual telemedicine encounter.      Reason for Telemedicine Visit: Services only offered telehealth    Originating Site (Patient Location): Patient's home    Distant Site (Provider Location): Provider Remote Setting- Home Office    Consent:  The patient/guardian has verbally consented to: the potential risks and benefits of telemedicine (video visit) versus in person care; bill my insurance or make self-payment for services provided; and responsibility for payment of non-covered services.     Mode of Communication:  Video Conference via [employer]    As the provider I attest to compliance with applicable laws and regulations related to telemedicine.      Treatment Plan Last Reviewed: 9/14/21  PHQ-9 / CHAVO-7 :   PHQ 11/3/2021 11/15/2021 12/2/2021   PHQ-9 Total Score 14 12 13   Q9: Thoughts of better off dead/self-harm past 2 weeks Not at all Not at all Not at all   F/U: Thoughts of suicide or self-harm - - -   F/U: Self harm-plan - - -   F/U: Self-harm action - - -   F/U: Safety concerns - - -   Some encounter information is confidential and restricted. Go to Review Flowsheets activity to see all data.     CHAVO-7 SCORE 10/26/2021 11/15/2021 12/2/2021   Total Score 12 (moderate anxiety) 12 (moderate anxiety) 15 (severe anxiety)   Total Score 12 12 15   Some encounter information is confidential and restricted. Go to Review Flowsheets activity to see all data.         DATA  Extended Session (53+ minutes): No  Interactive Complexity: No  Crisis: No      Progress Since Last Session (Related to Symptoms /  Goals / Homework):   Symptoms: more stable than before, some emotional outbursts at hr      Homework: Partially completed  Choose not to watch the news -done some  Make an appointment with Shayne -done  Consider making jewelry or art -not done    Notice when anger comes up  Be kinder to others and to your own parts  Pay attention to physical sensations when having feelings    Continue to engage in behavioral activation, 10 minutes a day  Get back to the pool if it feels safe    Continue IFS, work on building crust     Episode of Care Goals: Minimal progress - ACTION (Actively working towards change); Intervened by reinforcing change plan / affirming steps taken     Current / Ongoing Stressors and Concerns:   Patient is currently socially isolated. She has a conflictual relationship with her .  She is getting minimal physical activity.  She had recent eye surgery     Treatment Objective(s) Addressed in This Session:   Patient will increase frequency of engaging her in ADLs.  Patient will track and record at least 5 pleasant exchanges with . Patient will be able to identify at least 5 positive traits about her .  Patient will reduce level of depressive and anxious features as evidenced by reduction in score on her CHAVO-7 and PHQ-9 (scores of 15 and 16 at first measurment, respectively).     Intervention:   Supportive Therapy: Explored changes in patient's life, especially related to a friend's partner. Also talked about how she is managing her moods with her . Processed upcoming surgery. Discussed worry related to having changed primary care physicians.     ASSESSMENT: Current Emotional / Mental Status (status of significant symptoms):   Risk status (Self / Other harm or suicidal ideation)   Patient denies current fears or concerns for personal safety.   Patient denies current or recent suicidal ideation or behaviors.   Patient denies current or recent homicidal ideation or  behaviors.   Patient denies current or recent self injurious behavior or ideation.   Patient denies other safety concerns.   Patient reports there has been no change in risk factors since their last session.     Patient reports there has been no change in protective factors since their last session.     A safety and risk management plan has been developed including: Patient consented to co-developed safety plan.  Safety and risk management plan was completed.  Patient agreed to use safety plan should any safety concerns arise.  A copy was given to the patient. This was done on 11/24/2020 and is attached to the bottom of the note.     Appearance:   Appropriate    Eye Contact:   Fair    Psychomotor Behavior: Normal    Attitude:   Cooperative    Orientation:   All   Speech    Rate / Production: Normal/ Responsive    Volume:  Normal    Mood:    Anxious  Sad    Affect:    Appropriate    Thought Content:  Clear    Thought Form:  Coherent    Insight:    Fair      Medication Review:   No changes to current psychiatric medication(s)      Medication Compliance:   Yes     Changes in Health Issues:   None reported     Chemical Use Review:   Substance Use: decrease in use.  Patient reports frequency of use none since the third week of August.  Provided encouragement towards sobriety        Tobacco Use: No current tobacco use.      Diagnosis:  1. Bipolar 2 disorder (H)    2. CHAVO (generalized anxiety disorder)        Collateral Reports Completed:   Not Applicable    PLAN: (Patient Tasks / Therapist Tasks / Other)  Choose not to watch the news  Notice when anger comes up  Support your friend    Continue to engage in behavioral activation, 10 minutes a day  Get back to the pool if it feels safe        MICAELA SLADE    December 2, 2021                                                         ______________________________________________________________________    Treatment Plan    Patient's Name: Randi Cleary  Date Of  "Birth: 1955    Date: 19    DSM5 Diagnoses: 296.32 (F33.1) Major Depressive Disorder, Recurrent Episode, Moderate With anxious distress  Psychosocial / Contextual Factors: Patient's entire family of origin has , she now has a sister-in-law and  as support.  Relationship with  is conflictual.  Patient is reporting increase in physical symptoms due to COVID-19.  Patient is off of pain medications.  WHODAS: 42    Referral / Collaboration:  Referral to another professional/service is not indicated at this time.     Anticipated number of session or this episode of care: 12-15      MeasurableTreatment Goal(s) related to diagnosis / functional impairment(s)  Goal 1: Patient will \"jumpstart, getting going with the things I need to be doing around the house as far as picking up, doing things, trying to do something every day.  Also to lessen the animosity between me and my .\"    I will know I've met my goal when my shoulders are fixed and I can see.      Objective #A (Patient Action)    Patient will increase frequency of engaging her in ADLs.  Status: Continued - Date(s): 21    Intervention(s)  Therapist will engage patient in CBT, specifically behavioral activation.    Objective #B  Patient will track and record at least 5 pleasant exchanges with . Patient will be able to identify at least 5 positive traits about her  and how he relates to her.  Status: Continued - Date(s): 21    Intervention(s)  Therapist will teach assertiveness skills and assign homework related to relationship interactions.    Objective #C  Patient will reduce level of depressive and anxious features as evidenced by reduction in score on her CHAVO-7 and PHQ-9 (scores of 15 and 16 at first measurment, respectively).  Status: Continued - Date(s):  21    Intervention(s)  Therapist will engage patient in person-centered therapy and CBT.    Patient has reviewed and agreed to the above plan.      B " "MICAELA BAKER JO  September 14, 2021                                                Randi Cleary          SAFETY PLAN:  Step 1: Warning signs / cues (Thoughts, images, mood, situation, behavior) that a crisis may be developing:  ? Thoughts: \"I don't want to continue\" \"I am unwanted\"  ? Images: none  ? Thinking Processes: ruminating  ? Mood: anger  ? Behaviors: isolating/withdrawing , can't stop crying, not taking care of myself and not taking care of my responsibilities  ? Situations: small triggers, such as not being able to find something, or dropping something   Step 2: Coping strategies - Things I can do to take my mind off of my problems without contacting another person (relaxation technique, physical activity):  ? Distress Tolerance Strategies:  arts and crafts: drawing, play with my pet , listen to positive and upbeat music: any, change body temperature (ice pack/cold water)  and paced breathing/progressive muscle relaxation  ? Physical Activities: go for a walk, deep breathing and stretching   ? Focus on helpful thoughts:  \"You've been through this before, you can get through it again.\"  Step 3: People and social settings that provide distraction:                 Name: Carmen                            Name: Darien                           Name: Aleida       ? pool, shopping, Carmen's house, [employer]       Step 4: Remind myself of people and things that are important to me and worth living for:  Clifford Little Donna, post-COVID world, options of what could be in your future        Step 5: When I am in crisis, I can ask these people to help me use my safety plan:                 Name: Sidney  Step 6: Making the environment safe:   ? go to sleep/daydream  Step 7: Professionals or agencies I can contact during a crisis:  ? [facility] Daytime Number: [phone]  ? Suicide Prevention Lifeline: 8-504-240-GLPO (8003)  ? Crisis Text Line Service (available 24 hours a day, 7 days a week): Text [place] to " 134899    Local Crisis Services: [facility] Crisis: [phone]     Call 911 or go to my nearest emergency department.       I helped develop this safety plan and agree to use it when needed.  I have been given a copy of this plan.       Client signature _________________________________________________________________  Today s date:  11/24/2020  Adapted from Safety Plan Template 2008 Randi Poole and Robby Barba is reprinted with the express permission of the authors.  No portion of the Safety Plan Template may be reproduced without the express, written permission.  You can contact the authors at [email or web address] or [email or web address].

## 2021-12-02 NOTE — PATIENT INSTRUCTIONS
Choose not to watch the news  Notice when anger comes up  Support your friend    Continue to engage in behavioral activation, 10 minutes a day  Get back to the pool if it feels safe

## 2021-12-03 ASSESSMENT — ANXIETY QUESTIONNAIRES: GAD7 TOTAL SCORE: 15

## 2021-12-03 ASSESSMENT — PATIENT HEALTH QUESTIONNAIRE - PHQ9: SUM OF ALL RESPONSES TO PHQ QUESTIONS 1-9: 13

## 2021-12-06 ENCOUNTER — VIRTUAL VISIT (OUTPATIENT)
Dept: PSYCHOLOGY | Facility: CLINIC | Age: 66
End: 2021-12-06
Payer: MEDICARE

## 2021-12-06 ENCOUNTER — MYC MEDICAL ADVICE (OUTPATIENT)
Dept: ONCOLOGY | Facility: CLINIC | Age: 66
End: 2021-12-06
Payer: MEDICARE

## 2021-12-06 DIAGNOSIS — F41.1 GAD (GENERALIZED ANXIETY DISORDER): ICD-10-CM

## 2021-12-06 DIAGNOSIS — F31.81 BIPOLAR 2 DISORDER (H): Primary | ICD-10-CM

## 2021-12-06 PROCEDURE — 90834 PSYTX W PT 45 MINUTES: CPT | Mod: 95 | Performed by: SOCIAL WORKER

## 2021-12-06 ASSESSMENT — PATIENT HEALTH QUESTIONNAIRE - PHQ9
SUM OF ALL RESPONSES TO PHQ QUESTIONS 1-9: 18
10. IF YOU CHECKED OFF ANY PROBLEMS, HOW DIFFICULT HAVE THESE PROBLEMS MADE IT FOR YOU TO DO YOUR WORK, TAKE CARE OF THINGS AT HOME, OR GET ALONG WITH OTHER PEOPLE: VERY DIFFICULT
SUM OF ALL RESPONSES TO PHQ QUESTIONS 1-9: 18
10. IF YOU CHECKED OFF ANY PROBLEMS, HOW DIFFICULT HAVE THESE PROBLEMS MADE IT FOR YOU TO DO YOUR WORK, TAKE CARE OF THINGS AT HOME, OR GET ALONG WITH OTHER PEOPLE: VERY DIFFICULT
SUM OF ALL RESPONSES TO PHQ QUESTIONS 1-9: 18
SUM OF ALL RESPONSES TO PHQ QUESTIONS 1-9: 18

## 2021-12-06 NOTE — PROGRESS NOTES
Progress Note    Patient Name: Randi Cleary  Date: 12/6/21         Service Type: Individual      Session Start Time: 11:30 AM  Session End Time: 12:15 PM     Session Length: 45 minutes     Session #: 69    Attendees: Client attended alone    Service Modality:  Video Visit:    Telemedicine Visit: The patient's condition can be safely assessed and treated via synchronous audio and visual telemedicine encounter.      Reason for Telemedicine Visit: Services only offered telehealth    Originating Site (Patient Location): Patient's home    Distant Site (Provider Location): Provider Remote Setting- Home Office    Consent:  The patient/guardian has verbally consented to: the potential risks and benefits of telemedicine (video visit) versus in person care; bill my insurance or make self-payment for services provided; and responsibility for payment of non-covered services.     Mode of Communication:  Video Conference via [employer]    As the provider I attest to compliance with applicable laws and regulations related to telemedicine.      Treatment Plan Last Reviewed: 9/14/21  PHQ-9 / CHAVO-7 :   PHQ 12/2/2021 12/6/2021 12/6/2021   PHQ-9 Total Score 13 18 18   Q9: Thoughts of better off dead/self-harm past 2 weeks Not at all More than half the days More than half the days   F/U: Thoughts of suicide or self-harm - Yes Yes   F/U: Self harm-plan - Yes Yes   F/U: Self-harm action - Yes Yes   F/U: Safety concerns - Yes Yes   Some encounter information is confidential and restricted. Go to Review Flowsheets activity to see all data.     CHAVO-7 SCORE 10/26/2021 11/15/2021 12/2/2021   Total Score 12 (moderate anxiety) 12 (moderate anxiety) 15 (severe anxiety)   Total Score 12 12 15   Some encounter information is confidential and restricted. Go to Review Flowsheets activity to see all data.         DATA  Extended Session (53+ minutes): No  Interactive Complexity: No  Crisis: No      Progress  "Since Last Session (Related to Symptoms / Goals / Homework):   Symptoms: \"having a bad day, it's the way my life is now, its whether or not I can handle it every day\"     Homework: Partially completed  Choose not to watch the news -done some  Make an appointment with Baxter -done  Consider making jewelry or art -not done    Notice when anger comes up  Be kinder to others and to your own parts  Pay attention to physical sensations when having feelings    Continue to engage in behavioral activation, 10 minutes a day  Get back to the pool if it feels safe    Continue IFS, work on building crust     Episode of Care Goals: Minimal progress - ACTION (Actively working towards change); Intervened by reinforcing change plan / affirming steps taken     Current / Ongoing Stressors and Concerns:   Patient is currently socially isolated. She has a conflictual relationship with her .  She is getting minimal physical activity.  She had recent eye surgery     Treatment Objective(s) Addressed in This Session:   Patient will increase frequency of engaging her in ADLs.  Patient will track and record at least 5 pleasant exchanges with . Patient will be able to identify at least 5 positive traits about her .  Patient will reduce level of depressive and anxious features as evidenced by reduction in score on her CHAVO-7 and PHQ-9 (scores of 15 and 16 at first measurment, respectively).     Intervention:   Supportive Therapy: Discussed how she's frustrated that no one is able to help with her mood or her pain. Talked about how managing her pain through the upcoming surgeries may help with her mood. Discuss the feeling of overwhelm and how to manage this. Broke down the most recent problem.     Internal Family Systems Therapy:  Had patient notice the sensation that was overwhelming them, focusing on feeling alone, feeling this in her chest and her breathing.  Helped patient ask everything else to give her space to just " notice.  Slowly got comfortable with this sensation and agreed to check in on it twice a day. Modeled about how to explore polarities she has, such as both wanting to go and wanting to cancel her doctor's appointment.    ASSESSMENT: Current Emotional / Mental Status (status of significant symptoms):   Risk status (Self / Other harm or suicidal ideation)   Patient denies current fears or concerns for personal safety.   Patient denies current or recent suicidal ideation or behaviors. Reports she was frustrated when doing the check in and just marked yes to all questions rather than reading them.   Patient denies current or recent homicidal ideation or behaviors.   Patient denies current or recent self injurious behavior or ideation.   Patient denies other safety concerns.   Patient reports there has been no change in risk factors since their last session.     Patient reports there has been no change in protective factors since their last session.     A safety and risk management plan has been developed including: Patient consented to co-developed safety plan.  Safety and risk management plan was completed.  Patient agreed to use safety plan should any safety concerns arise.  A copy was given to the patient. This was done on 11/24/2020 and is attached to the bottom of the note.     Appearance:   Appropriate    Eye Contact:   Fair    Psychomotor Behavior: Normal    Attitude:   Cooperative    Orientation:   All   Speech    Rate / Production: Normal/ Responsive    Volume:  Normal    Mood:    Anxious  Sad    Affect:    Appropriate    Thought Content:  Clear    Thought Form:  Coherent    Insight:    Fair      Medication Review:   No changes to current psychiatric medication(s)      Medication Compliance:   Yes     Changes in Health Issues:   None reported     Chemical Use Review:   Substance Use: decrease in use.  Patient reports frequency of use none since the third week of August.  Provided encouragement towards sobriety   "      Tobacco Use: No current tobacco use.      Diagnosis:  1. Bipolar 2 disorder (H)    2. CHAVO (generalized anxiety disorder)        Collateral Reports Completed:   Not Applicable    PLAN: (Patient Tasks / Therapist Tasks / Other)  Sit and check in with that part of you that feels alone twice a day        MICAELA SLADE JO    2021                                                         ______________________________________________________________________    Treatment Plan    Patient's Name: Randi Cleary  YOB: 1955    Date: 21    DSM5 Diagnoses: 296.32 (F33.1) Major Depressive Disorder, Recurrent Episode, Moderate With anxious distress  Psychosocial / Contextual Factors: Patient's entire family of origin has , she now has a sister-in-law and  as support.  Relationship with  is conflictual.  Patient is reporting increase in physical symptoms due to COVID-19.  Patient is off of pain medications.  WHODAS: 42    Referral / Collaboration:  Referral to another professional/service is not indicated at this time.     Anticipated number of session or this episode of care: 12-15      MeasurableTreatment Goal(s) related to diagnosis / functional impairment(s)  Goal 1: Patient will \"jumpstart, getting going with the things I need to be doing around the house as far as picking up, doing things, trying to do something every day.  Also to lessen the animosity between me and my .\"    I will know I've met my goal when my shoulders are fixed and I can see.      Objective #A (Patient Action)    Patient will increase frequency of engaging her in ADLs.  Status: Continued - Date(s): 21    Intervention(s)  Therapist will engage patient in CBT, specifically behavioral activation.    Objective #B  Patient will track and record at least 5 pleasant exchanges with . Patient will be able to identify at least 5 positive traits about her  and how he relates to " "her.  Status: Continued - Date(s): 9/14/21    Intervention(s)  Therapist will teach assertiveness skills and assign homework related to relationship interactions.    Objective #C  Patient will reduce level of depressive and anxious features as evidenced by reduction in score on her CHAVO-7 and PHQ-9 (scores of 15 and 16 at first measurment, respectively).  Status: Continued - Date(s):  9/14/21    Intervention(s)  Therapist will engage patient in person-centered therapy and CBT.    Patient has reviewed and agreed to the above plan.      MICAELA SLADE  September 14, 2021                                                Randi Cleary          SAFETY PLAN:  Step 1: Warning signs / cues (Thoughts, images, mood, situation, behavior) that a crisis may be developing:  ? Thoughts: \"I don't want to continue\" \"I am unwanted\"  ? Images: none  ? Thinking Processes: ruminating  ? Mood: anger  ? Behaviors: isolating/withdrawing , can't stop crying, not taking care of myself and not taking care of my responsibilities  ? Situations: small triggers, such as not being able to find something, or dropping something   Step 2: Coping strategies - Things I can do to take my mind off of my problems without contacting another person (relaxation technique, physical activity):  ? Distress Tolerance Strategies:  arts and crafts: drawing, play with my pet , listen to positive and upbeat music: any, change body temperature (ice pack/cold water)  and paced breathing/progressive muscle relaxation  ? Physical Activities: go for a walk, deep breathing and stretching   ? Focus on helpful thoughts:  \"You've been through this before, you can get through it again.\"  Step 3: People and social settings that provide distraction:                 Name: Carmen                            Name: Darien                           Name: Aleida       ? pool, shopping, Carmen's house, [employer]       Step 4: Remind myself of people and things that are important to me " and worth living for:  SidneyClifford Donna, post-COVID world, options of what could be in your future        Step 5: When I am in crisis, I can ask these people to help me use my safety plan:                 Name: Sidney  Step 6: Making the environment safe:   ? go to sleep/daydream  Step 7: Professionals or agencies I can contact during a crisis:  ? [facility] Daytime Number: [phone]  ? Suicide Prevention Lifeline: 8-884-358-BYNV (3614)  ? Crisis Text Line Service (available 24 hours a day, 7 days a week): Text [place] to 476626    Local Crisis Services: [facility] Crisis: [phone]     Call 911 or go to my nearest emergency department.       I helped develop this safety plan and agree to use it when needed.  I have been given a copy of this plan.       Client signature _________________________________________________________________  Today s date:  11/24/2020  Adapted from Safety Plan Template 2008 Randi Poole and Robby Barba is reprinted with the express permission of the authors.  No portion of the Safety Plan Template may be reproduced without the express, written permission.  You can contact the authors at [email or web address] or [email or web address].

## 2021-12-06 NOTE — TELEPHONE ENCOUNTER
"Winnie calls today with questions/ concerns on the need to have a phlebotomy prior to her surgery on 12/21/2021    1.  She is fearful of being out in the clinic exposed to more people/ Covid/ viruses.  Surgery  Has been delayed 4 times and she does not want to delay again.    2. Mobility within the clinic is difficult for her, even with assistance.   Transportation and parking are concerns, although we did discuss options.    3. What are the risks to her surgery/ healing if she does not have the phlebotomy prior to 12/21/21 surgery?  Her surgical team has not offered any concerns regarding her iron/ ferritin/ hgb    4. She will be in a neck/ back brace for 6 weeks following the surgery, but feels she could possible come for phlebotomy during that time.    Phlebotomy is scheduled for TOMORROW 12/7 and office visit with  on 1/11/2022  Winnie is just overall feeling \"not good\"  For a wide variety of reasons.     Will route to MD for advice    From Tova Pablo MD      Hemachromatosis will not effect her healing- she can proceed with surgery and cancel phlebotomy if her anxiety and mobility is an issue.      Hopefully that will help with her mobility and make getting phlebotomy easier in future. She may also have some mild blood loss during surgery- which may help lower her iron saturation.     I can try to find to discuss with her tomorrow evening or Wednesday morning.   Tova Pablo MD/PhD    of Medicine   Division of Hematology, Oncology and Transplantation   Pager [phone]      Updated patient with the above info and cancelled the phlebotomy       "

## 2021-12-07 DIAGNOSIS — J44.9 CHRONIC OBSTRUCTIVE PULMONARY DISEASE, UNSPECIFIED COPD TYPE (H): ICD-10-CM

## 2021-12-07 RX ORDER — ALBUTEROL SULFATE 90 UG/1
2 AEROSOL, METERED RESPIRATORY (INHALATION) EVERY 6 HOURS
Qty: 18 G | Refills: 11 | Status: SHIPPED | OUTPATIENT
Start: 2021-12-07 | End: 2022-09-29

## 2021-12-07 ASSESSMENT — PATIENT HEALTH QUESTIONNAIRE - PHQ9
SUM OF ALL RESPONSES TO PHQ QUESTIONS 1-9: 18
SUM OF ALL RESPONSES TO PHQ QUESTIONS 1-9: 18

## 2021-12-09 ENCOUNTER — TELEPHONE (OUTPATIENT)
Dept: PALLIATIVE MEDICINE | Facility: OTHER | Age: 66
End: 2021-12-09
Payer: MEDICARE

## 2021-12-09 DIAGNOSIS — M25.50 PAIN IN JOINT, MULTIPLE SITES: ICD-10-CM

## 2021-12-09 NOTE — TELEPHONE ENCOUNTER
Received call from patient requesting   hydrocodone    Last dispensed from pharmacy on: 12/2/21    Patient's last office/virtual visit by prescribing provider on 10/21/21  Next office/virtual appointment scheduled for 12/22/21    UDT/CSA are out of date

## 2021-12-10 ENCOUNTER — VIRTUAL VISIT (OUTPATIENT)
Dept: PSYCHOLOGY | Facility: CLINIC | Age: 66
End: 2021-12-10
Payer: MEDICARE

## 2021-12-10 DIAGNOSIS — Z01.812 ENCOUNTER FOR PRE-OPERATIVE LABORATORY TESTING: Primary | ICD-10-CM

## 2021-12-10 DIAGNOSIS — F41.1 GAD (GENERALIZED ANXIETY DISORDER): ICD-10-CM

## 2021-12-10 DIAGNOSIS — F31.81 BIPOLAR 2 DISORDER (H): Primary | ICD-10-CM

## 2021-12-10 PROCEDURE — 90837 PSYTX W PT 60 MINUTES: CPT | Mod: 95 | Performed by: SOCIAL WORKER

## 2021-12-10 RX ORDER — HYDROCODONE BITARTRATE AND ACETAMINOPHEN 5; 325 MG/1; MG/1
1 TABLET ORAL AT BEDTIME
Qty: 10 TABLET | Refills: 0 | Status: SHIPPED | OUTPATIENT
Start: 2021-12-10 | End: 2021-12-17

## 2021-12-10 NOTE — PROGRESS NOTES
Progress Note    Patient Name: Randi Cleary  Date: 12/10/21         Service Type: Individual      Session Start Time: 11:01 AM  Session End Time: 11:55 AM     Session Length: 54 minutes     Session #: 70    Attendees: Client attended alone    Service Modality:  Video Visit:    Telemedicine Visit: The patient's condition can be safely assessed and treated via synchronous audio and visual telemedicine encounter.      Reason for Telemedicine Visit: Services only offered telehealth    Originating Site (Patient Location): Patient's home    Distant Site (Provider Location): Provider Remote Setting- Home Office    Consent:  The patient/guardian has verbally consented to: the potential risks and benefits of telemedicine (video visit) versus in person care; bill my insurance or make self-payment for services provided; and responsibility for payment of non-covered services.     Mode of Communication:  Video Conference via [employer]    As the provider I attest to compliance with applicable laws and regulations related to telemedicine.      Treatment Plan Last Reviewed: 9/14/21  PHQ-9 / CHAVO-7 :   PHQ 12/2/2021 12/6/2021 12/6/2021   PHQ-9 Total Score 13 18 18   Q9: Thoughts of better off dead/self-harm past 2 weeks Not at all More than half the days More than half the days   F/U: Thoughts of suicide or self-harm - Yes Yes   F/U: Self harm-plan - Yes Yes   F/U: Self-harm action - Yes Yes   F/U: Safety concerns - Yes Yes   Some encounter information is confidential and restricted. Go to Review Flowsheets activity to see all data.     CHAVO-7 SCORE 10/26/2021 11/15/2021 12/2/2021   Total Score 12 (moderate anxiety) 12 (moderate anxiety) 15 (severe anxiety)   Total Score 12 12 15   Some encounter information is confidential and restricted. Go to Review Flowsheets activity to see all data.         DATA  Extended Session (53+ minutes): PROLONGED SERVICE IN THE OUTPATIENT SETTING REQUIRING  DIRECT (FACE-TO-FACE) PATIENT CONTACT BEYOND THE USUAL SERVICE:    - High distress and under complex circumstances.  See Data section for details  Interactive Complexity: No  Crisis: No      Progress Since Last Session (Related to Symptoms / Goals / Homework):   Symptoms: more relaxed     Homework: Achieved / completed to satisfaction  Sit and check in with that part of you that feels alone twice a day     Episode of Care Goals: Minimal progress - ACTION (Actively working towards change); Intervened by reinforcing change plan / affirming steps taken     Current / Ongoing Stressors and Concerns:   Patient is currently socially isolated. She has a conflictual relationship with her .  She is getting minimal physical activity.  She had recent eye surgery     Treatment Objective(s) Addressed in This Session:   Patient will increase frequency of engaging her in ADLs.  Patient will track and record at least 5 pleasant exchanges with . Patient will be able to identify at least 5 positive traits about her .  Patient will reduce level of depressive and anxious features as evidenced by reduction in score on her CHAVO-7 and PHQ-9 (scores of 15 and 16 at first measurment, respectively).     Intervention:   Supportive Therapy: Discussed support she is getting and how she is caring for herself right now.  Discussed keeping busy to distract herself until surgery. Discussed concerns related to mood and her fears around the mood not improving after the surgery.      ASSESSMENT: Current Emotional / Mental Status (status of significant symptoms):   Risk status (Self / Other harm or suicidal ideation)   Patient denies current fears or concerns for personal safety.   Patient denies current or recent suicidal ideation or behaviors.    Patient denies current or recent homicidal ideation or behaviors.   Patient denies current or recent self injurious behavior or ideation.   Patient denies other safety concerns.   Patient  reports there has been no change in risk factors since their last session.     Patient reports there has been no change in protective factors since their last session.     A safety and risk management plan has been developed including: Patient consented to co-developed safety plan.  Safety and risk management plan was completed.  Patient agreed to use safety plan should any safety concerns arise.  A copy was given to the patient. This was done on 2020 and is attached to the bottom of the note.     Appearance:   Appropriate    Eye Contact:   Fair    Psychomotor Behavior: Normal    Attitude:   Cooperative    Orientation:   All   Speech    Rate / Production: Normal/ Responsive    Volume:  Normal    Mood:    Anxious    Affect:    Appropriate    Thought Content:  Rumination    Thought Form:  Coherent    Insight:    Fair      Medication Review:   No changes to current psychiatric medication(s)      Medication Compliance:   Yes     Changes in Health Issues:   None reported     Chemical Use Review:   Substance Use: decrease in use.  Patient reports frequency of use none since the third week of August.  Provided encouragement towards sobriety        Tobacco Use: No current tobacco use.      Diagnosis:  1. Bipolar 2 disorder (H)    2. CHAVO (generalized anxiety disorder)        Collateral Reports Completed:   Not Applicable    PLAN: (Patient Tasks / Therapist Tasks / Other)  Put up lights  Keep busy        MICAELA SLADE    December 10, 2021                                                         ______________________________________________________________________    Treatment Plan    Patient's Name: Randi Cleary  YOB: 1955    Date: 12/10/21    DSM5 Diagnoses: 296.32 (F33.1) Major Depressive Disorder, Recurrent Episode, Moderate With anxious distress  Psychosocial / Contextual Factors: Patient's entire family of origin has , she now has a sister-in-law and  as support.  Relationship  "with  is conflictual.  Patient is reporting increase in physical symptoms due to COVID-19.  Patient is off of pain medications.  WHODAS: 42    Referral / Collaboration:  Referral to another professional/service is not indicated at this time.     Anticipated number of session or this episode of care: 12-15      MeasurableTreatment Goal(s) related to diagnosis / functional impairment(s)  Goal 1: Patient will \"jumpstart, getting going with the things I need to be doing around the house as far as picking up, doing things, trying to do something every day.  Also to lessen the animosity between me and my .\"    I will know I've met my goal when my shoulders are fixed and I can see.      Objective #A (Patient Action)    Patient will increase frequency of engaging her in ADLs.  Status: Continued - Date(s): 12/10/21    Intervention(s)  Therapist will engage patient in CBT, specifically behavioral activation.    Objective #B  Patient will track and record at least 5 pleasant exchanges with . Patient will be able to identify at least 5 positive traits about her  and how he relates to her.  Status: Continued - Date(s): 12/10/21    Intervention(s)  Therapist will teach assertiveness skills and assign homework related to relationship interactions.    Objective #C  Patient will reduce level of depressive and anxious features as evidenced by reduction in score on her CHAVO-7 and PHQ-9 (scores of 15 and 16 at first measurment, respectively).  Status: Continued - Date(s):  12/10/21    Intervention(s)  Therapist will engage patient in person-centered therapy and CBT.    Patient has reviewed and agreed to the above plan.      MICAELA SLADE  December 10, 2021                                                Randi Cleary          SAFETY PLAN:  Step 1: Warning signs / cues (Thoughts, images, mood, situation, behavior) that a crisis may be developing:  ? Thoughts: \"I don't want to continue\" \"I am " "unwanted\"  ? Images: none  ? Thinking Processes: ruminating  ? Mood: anger  ? Behaviors: isolating/withdrawing , can't stop crying, not taking care of myself and not taking care of my responsibilities  ? Situations: small triggers, such as not being able to find something, or dropping something   Step 2: Coping strategies - Things I can do to take my mind off of my problems without contacting another person (relaxation technique, physical activity):  ? Distress Tolerance Strategies:  arts and crafts: drawing, play with my pet , listen to positive and upbeat music: any, change body temperature (ice pack/cold water)  and paced breathing/progressive muscle relaxation  ? Physical Activities: go for a walk, deep breathing and stretching   ? Focus on helpful thoughts:  \"You've been through this before, you can get through it again.\"  Step 3: People and social settings that provide distraction:                 Name: Carmen                            Name: Darien                           Name: Aleida       ? pool, shopping, Carmen's house, [employer]       Step 4: Remind myself of people and things that are important to me and worth living for:  Clifford Little Donna, post-COVID world, options of what could be in your future        Step 5: When I am in crisis, I can ask these people to help me use my safety plan:                 Name: Sidney  Step 6: Making the environment safe:   ? go to sleep/daydream  Step 7: Professionals or agencies I can contact during a crisis:  ? [facility] Daytime Number: [phone]  ? Suicide Prevention Lifeline: 9-304-411-SAEA (6138)  ? Crisis Text Line Service (available 24 hours a day, 7 days a week): Text [place] to 511624    Local Crisis Services: [facility] Crisis: [phone]     Call 911 or go to my nearest emergency department.       I helped develop this safety plan and agree to use it when needed.  I have been given a copy of this plan.       Client signature " _________________________________________________________________  Today s date:  11/24/2020  Adapted from Safety Plan Template 2008 Randi Poole and Robby Barba is reprinted with the express permission of the authors.  No portion of the Safety Plan Template may be reproduced without the express, written permission.  You can contact the authors at [email or web address] or [email or web address].

## 2021-12-10 NOTE — TELEPHONE ENCOUNTER
Will cue refill with the weekend coming, notes added that she is to call the clinic and schedule a follow up apt    Pending Prescriptions:                       Disp   Refills    HYDROcodone-acetaminophen (NORCO) 5-325 M*10 tab*0            Sig: Take 1 tablet by mouth At Bedtime For chronic           pain    Cub

## 2021-12-13 ENCOUNTER — TELEPHONE (OUTPATIENT)
Dept: NEUROSURGERY | Facility: CLINIC | Age: 66
End: 2021-12-13

## 2021-12-13 ENCOUNTER — OFFICE VISIT (OUTPATIENT)
Dept: FAMILY MEDICINE | Facility: CLINIC | Age: 66
End: 2021-12-13
Payer: MEDICARE

## 2021-12-13 VITALS
OXYGEN SATURATION: 96 % | HEART RATE: 101 BPM | BODY MASS INDEX: 35.83 KG/M2 | WEIGHT: 222 LBS | DIASTOLIC BLOOD PRESSURE: 68 MMHG | SYSTOLIC BLOOD PRESSURE: 118 MMHG

## 2021-12-13 DIAGNOSIS — Z01.818 PREOP GENERAL PHYSICAL EXAM: Primary | ICD-10-CM

## 2021-12-13 DIAGNOSIS — Z74.09 MOBILITY IMPAIRED: ICD-10-CM

## 2021-12-13 DIAGNOSIS — M54.12 CERVICAL RADICULOPATHY: ICD-10-CM

## 2021-12-13 DIAGNOSIS — Z01.812 ENCOUNTER FOR PRE-OPERATIVE LABORATORY TESTING: ICD-10-CM

## 2021-12-13 PROBLEM — Z86.018 HISTORY OF PHEOCHROMOCYTOMA: Status: RESOLVED | Noted: 2019-11-19 | Resolved: 2021-12-13

## 2021-12-13 PROBLEM — I51.89 OTHER ILL-DEFINED HEART DISEASES: Status: RESOLVED | Noted: 2019-09-24 | Resolved: 2021-12-13

## 2021-12-13 PROBLEM — Z92.241 HISTORY OF CORTICOSTEROID THERAPY: Status: RESOLVED | Noted: 2019-11-19 | Resolved: 2021-12-13

## 2021-12-13 PROBLEM — E89.6 HISTORY OF PARTIAL ADRENALECTOMY (H): Status: RESOLVED | Noted: 2019-11-19 | Resolved: 2021-12-13

## 2021-12-13 PROBLEM — Z85.3 HISTORY OF BREAST CANCER: Status: RESOLVED | Noted: 2020-08-28 | Resolved: 2021-12-13

## 2021-12-13 PROBLEM — Z98.890 STATUS POST CORONARY ANGIOGRAM: Status: RESOLVED | Noted: 2019-10-03 | Resolved: 2021-12-13

## 2021-12-13 PROBLEM — S13.4XXA INJURY OF NECK, WHIPLASH: Status: RESOLVED | Noted: 2021-07-15 | Resolved: 2021-12-13

## 2021-12-13 LAB
ANION GAP SERPL CALCULATED.3IONS-SCNC: 11 MMOL/L (ref 5–18)
APTT PPP: 34 SECONDS (ref 22–38)
BUN SERPL-MCNC: 12 MG/DL (ref 8–22)
CALCIUM SERPL-MCNC: 9.5 MG/DL (ref 8.5–10.5)
CHLORIDE BLD-SCNC: 104 MMOL/L (ref 98–107)
CO2 SERPL-SCNC: 24 MMOL/L (ref 22–31)
CREAT SERPL-MCNC: 0.64 MG/DL (ref 0.6–1.1)
ERYTHROCYTE [DISTWIDTH] IN BLOOD BY AUTOMATED COUNT: 11.9 % (ref 10–15)
GFR SERPL CREATININE-BSD FRML MDRD: >90 ML/MIN/1.73M2
GLUCOSE BLD-MCNC: 155 MG/DL (ref 70–125)
HCT VFR BLD AUTO: 44.2 % (ref 35–47)
HGB BLD-MCNC: 15.7 G/DL (ref 11.7–15.7)
INR PPP: 0.94 (ref 0.85–1.15)
MCH RBC QN AUTO: 34.4 PG (ref 26.5–33)
MCHC RBC AUTO-ENTMCNC: 35.5 G/DL (ref 31.5–36.5)
MCV RBC AUTO: 97 FL (ref 78–100)
PLATELET # BLD AUTO: 227 10E3/UL (ref 150–450)
POTASSIUM BLD-SCNC: 4.1 MMOL/L (ref 3.5–5)
RBC # BLD AUTO: 4.57 10E6/UL (ref 3.8–5.2)
SODIUM SERPL-SCNC: 139 MMOL/L (ref 136–145)
WBC # BLD AUTO: 5.7 10E3/UL (ref 4–11)

## 2021-12-13 PROCEDURE — 36415 COLL VENOUS BLD VENIPUNCTURE: CPT | Performed by: FAMILY MEDICINE

## 2021-12-13 PROCEDURE — 85027 COMPLETE CBC AUTOMATED: CPT | Mod: GA | Performed by: FAMILY MEDICINE

## 2021-12-13 PROCEDURE — 85610 PROTHROMBIN TIME: CPT | Mod: GA | Performed by: FAMILY MEDICINE

## 2021-12-13 PROCEDURE — 80048 BASIC METABOLIC PNL TOTAL CA: CPT | Performed by: FAMILY MEDICINE

## 2021-12-13 PROCEDURE — 85730 THROMBOPLASTIN TIME PARTIAL: CPT | Mod: GA | Performed by: FAMILY MEDICINE

## 2021-12-13 PROCEDURE — 99214 OFFICE O/P EST MOD 30 MIN: CPT | Performed by: FAMILY MEDICINE

## 2021-12-13 NOTE — PATIENT INSTRUCTIONS
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
## 2021-12-14 ENCOUNTER — TELEPHONE (OUTPATIENT)
Dept: FAMILY MEDICINE | Facility: CLINIC | Age: 66
End: 2021-12-14
Payer: MEDICARE

## 2021-12-14 ENCOUNTER — TELEPHONE (OUTPATIENT)
Dept: CARE COORDINATION | Facility: CLINIC | Age: 66
End: 2021-12-14
Payer: MEDICARE

## 2021-12-14 ENCOUNTER — TELEPHONE (OUTPATIENT)
Dept: FAMILY MEDICINE | Facility: CLINIC | Age: 66
End: 2021-12-14

## 2021-12-14 ENCOUNTER — VIRTUAL VISIT (OUTPATIENT)
Dept: PSYCHOLOGY | Facility: CLINIC | Age: 66
End: 2021-12-14
Payer: MEDICARE

## 2021-12-14 DIAGNOSIS — F41.1 GAD (GENERALIZED ANXIETY DISORDER): ICD-10-CM

## 2021-12-14 DIAGNOSIS — F31.81 BIPOLAR 2 DISORDER (H): Primary | ICD-10-CM

## 2021-12-14 PROCEDURE — 90834 PSYTX W PT 45 MINUTES: CPT | Mod: 95 | Performed by: SOCIAL WORKER

## 2021-12-14 ASSESSMENT — ANXIETY QUESTIONNAIRES
5. BEING SO RESTLESS THAT IT IS HARD TO SIT STILL: SEVERAL DAYS
GAD7 TOTAL SCORE: 14
2. NOT BEING ABLE TO STOP OR CONTROL WORRYING: MORE THAN HALF THE DAYS
7. FEELING AFRAID AS IF SOMETHING AWFUL MIGHT HAPPEN: MORE THAN HALF THE DAYS
GAD7 TOTAL SCORE: 14
1. FEELING NERVOUS, ANXIOUS, OR ON EDGE: NEARLY EVERY DAY
GAD7 TOTAL SCORE: 14
2. NOT BEING ABLE TO STOP OR CONTROL WORRYING: MORE THAN HALF THE DAYS
GAD7 TOTAL SCORE: 14
5. BEING SO RESTLESS THAT IT IS HARD TO SIT STILL: SEVERAL DAYS
GAD7 TOTAL SCORE: 14
7. FEELING AFRAID AS IF SOMETHING AWFUL MIGHT HAPPEN: MORE THAN HALF THE DAYS
3. WORRYING TOO MUCH ABOUT DIFFERENT THINGS: SEVERAL DAYS
4. TROUBLE RELAXING: MORE THAN HALF THE DAYS
3. WORRYING TOO MUCH ABOUT DIFFERENT THINGS: SEVERAL DAYS
6. BECOMING EASILY ANNOYED OR IRRITABLE: NEARLY EVERY DAY
4. TROUBLE RELAXING: MORE THAN HALF THE DAYS
6. BECOMING EASILY ANNOYED OR IRRITABLE: NEARLY EVERY DAY
GAD7 TOTAL SCORE: 14
1. FEELING NERVOUS, ANXIOUS, OR ON EDGE: NEARLY EVERY DAY

## 2021-12-14 NOTE — TELEPHONE ENCOUNTER
Please let patient know that I noted the clearance from cardiology from the 16th; will complete her preop for approval

## 2021-12-14 NOTE — TELEPHONE ENCOUNTER
"Frandy Stockton,     I received a call from patient this morning asking if you would \"take her back as a patient\" She stated she feels she needs to work with an Internist again. Patient reported she was not happy with the care she is receiving at the [facility]. Please advise.     Thanks,     Dave Myhre, RN  CCC RN  "

## 2021-12-14 NOTE — PROGRESS NOTES
Progress Note    Patient Name: Randi Cleary  Date: 12/14/21         Service Type: Individual      Session Start Time: 9:35 AM  Session End Time: 10:26 AM     Session Length: 51 minutes     Session #: 71    Attendees: Client attended alone    Service Modality:  Video Visit:    Telemedicine Visit: The patient's condition can be safely assessed and treated via synchronous audio and visual telemedicine encounter.      Reason for Telemedicine Visit: Services only offered telehealth    Originating Site (Patient Location): Patient's home    Distant Site (Provider Location): Provider Remote Setting- Home Office    Consent:  The patient/guardian has verbally consented to: the potential risks and benefits of telemedicine (video visit) versus in person care; bill my insurance or make self-payment for services provided; and responsibility for payment of non-covered services.     Mode of Communication:  Video Conference via [employer]    As the provider I attest to compliance with applicable laws and regulations related to telemedicine.      Treatment Plan Last Reviewed: 9/14/21  PHQ-9 / CHAVO-7 :   PHQ 12/2/2021 12/6/2021 12/6/2021   PHQ-9 Total Score 13 18 18   Q9: Thoughts of better off dead/self-harm past 2 weeks Not at all More than half the days More than half the days   F/U: Thoughts of suicide or self-harm - Yes Yes   F/U: Self harm-plan - Yes Yes   F/U: Self-harm action - Yes Yes   F/U: Safety concerns - Yes Yes   Some encounter information is confidential and restricted. Go to Review Flowsheets activity to see all data.     CHAVO-7 SCORE 12/2/2021 12/14/2021 12/14/2021   Total Score 15 (severe anxiety) - 14 (moderate anxiety)   Total Score 15 14 14   Some encounter information is confidential and restricted. Go to Review Flowsheets activity to see all data.         DATA  Extended Session (53+ minutes): No  Interactive Complexity: No  Crisis: No      Progress Since Last Session  (Related to Symptoms / Goals / Homework):   Symptoms: Worsening struggling with lack of hope and feeling overwhelmed     Homework: Partially completed  Put up lights -not done  Keep busy -done     Episode of Care Goals: Minimal progress - ACTION (Actively working towards change); Intervened by reinforcing change plan / affirming steps taken     Current / Ongoing Stressors and Concerns:   Patient is currently socially isolated. She has a conflictual relationship with her .  She is getting minimal physical activity.  She had recent eye surgery     Treatment Objective(s) Addressed in This Session:   Patient will increase frequency of engaging her in ADLs.  Patient will track and record at least 5 pleasant exchanges with . Patient will be able to identify at least 5 positive traits about her .  Patient will reduce level of depressive and anxious features as evidenced by reduction in score on her CHAVO-7 and PHQ-9 (scores of 15 and 16 at first measurment, respectively).     Intervention:   Supportive Therapy: Discussed frustration with provider and having to learn that she may have to have a new surgery date. During the appointment a care coordinator called and patient had asked for help with transitioning back to her old primary care provider.  Patient discussed her changing mood and how to manage this. Discussed next steps, including ways to keep moving. Talked about support systems.    ASSESSMENT: Current Emotional / Mental Status (status of significant symptoms):   Risk status (Self / Other harm or suicidal ideation)   Patient denies current fears or concerns for personal safety.   Patient denies current or recent suicidal ideation or behaviors.    Patient denies current or recent homicidal ideation or behaviors.   Patient denies current or recent self injurious behavior or ideation.   Patient denies other safety concerns.   Patient reports there has been no change in risk factors since their last  session.     Patient reports there has been no change in protective factors since their last session.     A safety and risk management plan has been developed including: Patient consented to co-developed safety plan.  Safety and risk management plan was completed.  Patient agreed to use safety plan should any safety concerns arise.  A copy was given to the patient. This was done on 2020 and is attached to the bottom of the note.     Appearance:   Appropriate    Eye Contact:   Fair    Psychomotor Behavior: Normal    Attitude:   Cooperative    Orientation:   All   Speech    Rate / Production: Normal/ Responsive    Volume:  Normal    Mood:    Anxious    Affect:    Appropriate    Thought Content:  Rumination    Thought Form:  Coherent    Insight:    Fair      Medication Review:   No changes to current psychiatric medication(s)      Medication Compliance:   Yes     Changes in Health Issues:   None reported     Chemical Use Review:   Substance Use: decrease in use.  Patient reports frequency of use none since the third week of August.  Provided encouragement towards sobriety        Tobacco Use: No current tobacco use.      Diagnosis:  1. Bipolar 2 disorder (H)    2. CHAVO (generalized anxiety disorder)        Collateral Reports Completed:   Not Applicable    PLAN: (Patient Tasks / Therapist Tasks / Other)  Consider looking into health care directive        MICAELA SLADE    2021                                                         ______________________________________________________________________    Treatment Plan    Patient's Name: Randi Cleary  YOB: 1955    Date: 12/10/21    DSM5 Diagnoses: 296.32 (F33.1) Major Depressive Disorder, Recurrent Episode, Moderate With anxious distress  Psychosocial / Contextual Factors: Patient's entire family of origin has , she now has a sister-in-law and  as support.  Relationship with  is conflictual.  Patient is  "reporting increase in physical symptoms due to COVID-19.  Patient is off of pain medications.  WHODAS: 42    Referral / Collaboration:  Referral to another professional/service is not indicated at this time.     Anticipated number of session or this episode of care: 12-15      MeasurableTreatment Goal(s) related to diagnosis / functional impairment(s)  Goal 1: Patient will \"jumpstart, getting going with the things I need to be doing around the house as far as picking up, doing things, trying to do something every day.  Also to lessen the animosity between me and my .\"    I will know I've met my goal when my shoulders are fixed and I can see.      Objective #A (Patient Action)    Patient will increase frequency of engaging her in ADLs.  Status: Continued - Date(s): 12/10/21    Intervention(s)  Therapist will engage patient in CBT, specifically behavioral activation.    Objective #B  Patient will track and record at least 5 pleasant exchanges with . Patient will be able to identify at least 5 positive traits about her  and how he relates to her.  Status: Continued - Date(s): 12/10/21    Intervention(s)  Therapist will teach assertiveness skills and assign homework related to relationship interactions.    Objective #C  Patient will reduce level of depressive and anxious features as evidenced by reduction in score on her CHAVO-7 and PHQ-9 (scores of 15 and 16 at first measurment, respectively).  Status: Continued - Date(s):  12/10/21    Intervention(s)  Therapist will engage patient in person-centered therapy and CBT.    Patient has reviewed and agreed to the above plan.      MICAELA SLADE  December 10, 2021                                                Randi Cleary          SAFETY PLAN:  Step 1: Warning signs / cues (Thoughts, images, mood, situation, behavior) that a crisis may be developing:  ? Thoughts: \"I don't want to continue\" \"I am unwanted\"  ? Images: none  ? Thinking Processes: " "ruminating  ? Mood: anger  ? Behaviors: isolating/withdrawing , can't stop crying, not taking care of myself and not taking care of my responsibilities  ? Situations: small triggers, such as not being able to find something, or dropping something   Step 2: Coping strategies - Things I can do to take my mind off of my problems without contacting another person (relaxation technique, physical activity):  ? Distress Tolerance Strategies:  arts and crafts: drawing, play with my pet , listen to positive and upbeat music: any, change body temperature (ice pack/cold water)  and paced breathing/progressive muscle relaxation  ? Physical Activities: go for a walk, deep breathing and stretching   ? Focus on helpful thoughts:  \"You've been through this before, you can get through it again.\"  Step 3: People and social settings that provide distraction:                 Name: Carmen                            Name: Darien                           Name: Aleida       ? pool, shopping, Carmen's house, [employer]       Step 4: Remind myself of people and things that are important to me and worth living for:  Clifford Little Donna, post-COVID world, options of what could be in your future        Step 5: When I am in crisis, I can ask these people to help me use my safety plan:                 Name: Sidney  Step 6: Making the environment safe:   ? go to sleep/daydream  Step 7: Professionals or agencies I can contact during a crisis:  ? [facility] Daytime Number: [phone]  ? Suicide Prevention Lifeline: 8-485-634-TALK (8013)  ? Crisis Text Line Service (available 24 hours a day, 7 days a week): Text [place] to 027121    Local Crisis Services: [facility] Crisis: [phone]     Call 911 or go to my nearest emergency department.       I helped develop this safety plan and agree to use it when needed.  I have been given a copy of this plan.       Client signature " _________________________________________________________________  Today s date:  11/24/2020  Adapted from Safety Plan Template 2008 Randi Poole and Robby Barba is reprinted with the express permission of the authors.  No portion of the Safety Plan Template may be reproduced without the express, written permission.  You can contact the authors at [email or web address] or [email or web address].

## 2021-12-14 NOTE — TELEPHONE ENCOUNTER
Reason for Call:  Other call back    Detailed comments: Pt regarding past conversation regarding continued care    Please call patient    Phone Number Patient can be reached at: Cell number on file:    Telephone Information:   Mobile [phone]       Best Time: anytime    Can we leave a detailed message on this number? YES    Call taken on 12/14/2021 at 8:30 AM by Alissa Negron

## 2021-12-14 NOTE — TELEPHONE ENCOUNTER
She saw Melissa for a Pre op, there is a note in chart that clears her for surgery.  Nov 16th, note in encounters for clearance.

## 2021-12-15 ENCOUNTER — TELEPHONE (OUTPATIENT)
Dept: FAMILY MEDICINE | Facility: CLINIC | Age: 66
End: 2021-12-15

## 2021-12-15 ENCOUNTER — PATIENT OUTREACH (OUTPATIENT)
Dept: CARE COORDINATION | Facility: CLINIC | Age: 66
End: 2021-12-15
Payer: MEDICARE

## 2021-12-15 ASSESSMENT — ANXIETY QUESTIONNAIRES
GAD7 TOTAL SCORE: 14
GAD7 TOTAL SCORE: 14

## 2021-12-15 NOTE — TELEPHONE ENCOUNTER
Christopher, please let her know that my practice is currently closed due to the backlog of current patients/Covid/etc.  My apologies.  She can check back with me in the spring.  Things might be lightening up at that time.

## 2021-12-15 NOTE — TELEPHONE ENCOUNTER
Reason for Call:  Other call back    Detailed comments: pt would like a call back regarding phone call from 12/14/2021         Phone Number Patient can be reached at: Home number on file [phone] (home)    Best Time: anytime    Can we leave a detailed message on this number? YES    Call taken on 12/15/2021 at 3:39 PM by Alissa Negron

## 2021-12-16 ENCOUNTER — VIRTUAL VISIT (OUTPATIENT)
Dept: PSYCHOLOGY | Facility: CLINIC | Age: 66
End: 2021-12-16
Payer: MEDICARE

## 2021-12-16 DIAGNOSIS — F31.81 BIPOLAR 2 DISORDER (H): Primary | ICD-10-CM

## 2021-12-16 DIAGNOSIS — F41.1 GAD (GENERALIZED ANXIETY DISORDER): ICD-10-CM

## 2021-12-16 PROCEDURE — 90834 PSYTX W PT 45 MINUTES: CPT | Mod: 95 | Performed by: SOCIAL WORKER

## 2021-12-16 NOTE — PROGRESS NOTES
CCC RN spoke briefly with patient today. She is accepting of the fact that Dr. Stockton's practice is currently closed to new patients. She is also aware she can check back in the Spring to see if there are any openings. Patient said she would like to have [facility] as her PCP. Writer provided her with information about the [facility] and their Internal Medicine Program. Patient said she would do some research on her own and decide which Provider best suits her needs.   No other needs or concerns at this time.

## 2021-12-16 NOTE — PROGRESS NOTES
Progress Note    Patient Name: Randi Cleary  Date: 12/16/21         Service Type: Individual      Session Start Time: 2:07 PM  Session End Time: 2:47 PM     Session Length: 40 minutes     Session #: 72    Attendees: Client attended alone    Service Modality:  Video Visit:    Telemedicine Visit: The patient's condition can be safely assessed and treated via synchronous audio and visual telemedicine encounter.      Reason for Telemedicine Visit: Services only offered telehealth    Originating Site (Patient Location): Patient's home    Distant Site (Provider Location): Provider Remote Setting- Home Office    Consent:  The patient/guardian has verbally consented to: the potential risks and benefits of telemedicine (video visit) versus in person care; bill my insurance or make self-payment for services provided; and responsibility for payment of non-covered services.     Mode of Communication:  Video Conference via [employer]    As the provider I attest to compliance with applicable laws and regulations related to telemedicine.      Treatment Plan Last Reviewed: 12/10/21  PHQ-9 / CHAVO-7 :   PHQ 12/2/2021 12/6/2021 12/6/2021   PHQ-9 Total Score 13 18 18   Q9: Thoughts of better off dead/self-harm past 2 weeks Not at all More than half the days More than half the days   F/U: Thoughts of suicide or self-harm - Yes Yes   F/U: Self harm-plan - Yes Yes   F/U: Self-harm action - Yes Yes   F/U: Safety concerns - Yes Yes   Some encounter information is confidential and restricted. Go to Review Flowsheets activity to see all data.     CHAVO-7 SCORE 12/2/2021 12/14/2021 12/14/2021   Total Score 15 (severe anxiety) - 14 (moderate anxiety)   Total Score 15 14 14   Some encounter information is confidential and restricted. Go to Review Flowsheets activity to see all data.         DATA  Extended Session (53+ minutes): No  Interactive Complexity: No  Crisis: No      Progress Since Last Session  "(Related to Symptoms / Goals / Homework):   Symptoms: patient reports now feeling \"numb\"     Homework: Partially completed  Consider looking into health care directive -thought about it, but hasn't done it     Episode of Care Goals: Minimal progress - ACTION (Actively working towards change); Intervened by reinforcing change plan / affirming steps taken     Current / Ongoing Stressors and Concerns:   Patient is currently socially isolated. She has a conflictual relationship with her .  She is getting minimal physical activity.  She had recent eye surgery     Treatment Objective(s) Addressed in This Session:   Patient will increase frequency of engaging her in ADLs.  Patient will track and record at least 5 pleasant exchanges with . Patient will be able to identify at least 5 positive traits about her .  Patient will reduce level of depressive and anxious features as evidenced by reduction in score on her CHAVO-7 and PHQ-9 (scores of 15 and 16 at first measurment, respectively).     Intervention:   Supportive Therapy: Discussed change with her health and feeling emotionally numb. Discussed options for next steps, including getting psychological testing to get more clarity. Talked about how medical concerns are intertwined with physical health.  Reviewed homework and identified successes and barriers to completion.    ASSESSMENT: Current Emotional / Mental Status (status of significant symptoms):   Risk status (Self / Other harm or suicidal ideation)   Patient denies current fears or concerns for personal safety.   Patient denies current or recent suicidal ideation or behaviors.    Patient denies current or recent homicidal ideation or behaviors.   Patient denies current or recent self injurious behavior or ideation.   Patient denies other safety concerns.   Patient reports there has been no change in risk factors since their last session.     Patient reports there has been no change in protective " factors since their last session.     A safety and risk management plan has been developed including: Patient consented to co-developed safety plan.  Safety and risk management plan was completed.  Patient agreed to use safety plan should any safety concerns arise.  A copy was given to the patient. This was done on 2020 and is attached to the bottom of the note.     Appearance:   Appropriate    Eye Contact:   Fair    Psychomotor Behavior: Normal    Attitude:   Cooperative    Orientation:   All   Speech    Rate / Production: Normal/ Responsive    Volume:  Normal    Mood:    Anxious    Affect:    Appropriate    Thought Content:  Rumination    Thought Form:  Coherent    Insight:    Fair      Medication Review:   No changes to current psychiatric medication(s)      Medication Compliance:   Yes     Changes in Health Issues:   None reported     Chemical Use Review:   Substance Use: decrease in use.  Patient reports frequency of use none since the third week of August.  Provided encouragement towards sobriety        Tobacco Use: No current tobacco use.      Diagnosis:  1. Bipolar 2 disorder (H)    2. CHAVO (generalized anxiety disorder)        Collateral Reports Completed:   Not Applicable    PLAN: (Patient Tasks / Therapist Tasks / Other)  Put in order for psychological testing- after surgery as patient doesn't want a phone call right now  Consider completing health care directive        MICAELA SLADE    2021                                                         ______________________________________________________________________    Treatment Plan    Patient's Name: Randi Cleary  YOB: 1955    Date: 12/10/21    DSM5 Diagnoses: 296.32 (F33.1) Major Depressive Disorder, Recurrent Episode, Moderate With anxious distress  Psychosocial / Contextual Factors: Patient's entire family of origin has , she now has a sister-in-law and  as support.  Relationship with  is  "conflictual.  Patient is reporting increase in physical symptoms due to COVID-19.  Patient is off of pain medications.  WHODAS: 42    Referral / Collaboration:  Referral to another professional/service is not indicated at this time.     Anticipated number of session or this episode of care: 12-15      MeasurableTreatment Goal(s) related to diagnosis / functional impairment(s)  Goal 1: Patient will \"jumpstart, getting going with the things I need to be doing around the house as far as picking up, doing things, trying to do something every day.  Also to lessen the animosity between me and my .\"    I will know I've met my goal when my shoulders are fixed and I can see.      Objective #A (Patient Action)    Patient will increase frequency of engaging her in ADLs.  Status: Continued - Date(s): 12/10/21    Intervention(s)  Therapist will engage patient in CBT, specifically behavioral activation.    Objective #B  Patient will track and record at least 5 pleasant exchanges with . Patient will be able to identify at least 5 positive traits about her  and how he relates to her.  Status: Continued - Date(s): 12/10/21    Intervention(s)  Therapist will teach assertiveness skills and assign homework related to relationship interactions.    Objective #C  Patient will reduce level of depressive and anxious features as evidenced by reduction in score on her CHAVO-7 and PHQ-9 (scores of 15 and 16 at first measurment, respectively).  Status: Continued - Date(s):  12/10/21    Intervention(s)  Therapist will engage patient in person-centered therapy and CBT.    Patient has reviewed and agreed to the above plan.      MICAELA SLADE  December 10, 2021                                                Randi Cleary          SAFETY PLAN:  Step 1: Warning signs / cues (Thoughts, images, mood, situation, behavior) that a crisis may be developing:  ? Thoughts: \"I don't want to continue\" \"I am unwanted\"  ? Images: " "none  ? Thinking Processes: ruminating  ? Mood: anger  ? Behaviors: isolating/withdrawing , can't stop crying, not taking care of myself and not taking care of my responsibilities  ? Situations: small triggers, such as not being able to find something, or dropping something   Step 2: Coping strategies - Things I can do to take my mind off of my problems without contacting another person (relaxation technique, physical activity):  ? Distress Tolerance Strategies:  arts and crafts: drawing, play with my pet , listen to positive and upbeat music: any, change body temperature (ice pack/cold water)  and paced breathing/progressive muscle relaxation  ? Physical Activities: go for a walk, deep breathing and stretching   ? Focus on helpful thoughts:  \"You've been through this before, you can get through it again.\"  Step 3: People and social settings that provide distraction:                 Name: Carmen                            Name: Darien                           Name: Aleida       ? pool, shopping, Carmen's house, [employer]       Step 4: Remind myself of people and things that are important to me and worth living for:  Clifford Little Donna, post-COVID world, options of what could be in your future        Step 5: When I am in crisis, I can ask these people to help me use my safety plan:                 Name: Sidney  Step 6: Making the environment safe:   ? go to sleep/daydream  Step 7: Professionals or agencies I can contact during a crisis:  ? [facility] Number: [phone]  ? Suicide Prevention Lifeline: 1-860-151-NIFR (1907)  ? Crisis Text Line Service (available 24 hours a day, 7 days a week): Text [place] to 478440    Local Crisis Services: [facility] Crisis: [phone]     Call 911 or go to my nearest emergency department.       I helped develop this safety plan and agree to use it when needed.  I have been given a copy of this plan.       Client signature " _________________________________________________________________  Today s date:  11/24/2020  Adapted from Safety Plan Template 2008 Randi Poole and Robby Barba is reprinted with the express permission of the authors.  No portion of the Safety Plan Template may be reproduced without the express, written permission.  You can contact the authors at [email or web address] or [email or web address].

## 2021-12-17 ENCOUNTER — LAB (OUTPATIENT)
Dept: LAB | Facility: CLINIC | Age: 66
End: 2021-12-17
Attending: SURGERY

## 2021-12-17 ENCOUNTER — ALLIED HEALTH/NURSE VISIT (OUTPATIENT)
Dept: NEUROSURGERY | Facility: CLINIC | Age: 66
End: 2021-12-17
Payer: MEDICARE

## 2021-12-17 ENCOUNTER — LAB (OUTPATIENT)
Dept: PALLIATIVE MEDICINE | Facility: OTHER | Age: 66
End: 2021-12-17
Payer: MEDICARE

## 2021-12-17 VITALS
HEART RATE: 97 BPM | RESPIRATION RATE: 18 BRPM | SYSTOLIC BLOOD PRESSURE: 122 MMHG | DIASTOLIC BLOOD PRESSURE: 70 MMHG | OXYGEN SATURATION: 95 %

## 2021-12-17 DIAGNOSIS — M25.50 PAIN IN JOINT, MULTIPLE SITES: ICD-10-CM

## 2021-12-17 DIAGNOSIS — G89.4 CHRONIC PAIN SYNDROME: Primary | ICD-10-CM

## 2021-12-17 DIAGNOSIS — M47.12 CERVICAL SPONDYLOSIS WITH MYELOPATHY: Primary | ICD-10-CM

## 2021-12-17 DIAGNOSIS — Z11.59 ENCOUNTER FOR SCREENING FOR OTHER VIRAL DISEASES: ICD-10-CM

## 2021-12-17 LAB
CANNABINOIDS UR QL SCN: ABNORMAL
CREAT UR-MCNC: 58 MG/DL
CREAT UR-MCNC: 59 MG/DL
CREAT UR-MCNC: 59 MG/DL

## 2021-12-17 PROCEDURE — 99207 PR NO CHARGE NURSE ONLY: CPT

## 2021-12-17 PROCEDURE — 80349 CANNABINOIDS NATURAL: CPT

## 2021-12-17 PROCEDURE — 80307 DRUG TEST PRSMV CHEM ANLYZR: CPT

## 2021-12-17 PROCEDURE — U0003 INFECTIOUS AGENT DETECTION BY NUCLEIC ACID (DNA OR RNA); SEVERE ACUTE RESPIRATORY SYNDROME CORONAVIRUS 2 (SARS-COV-2) (CORONAVIRUS DISEASE [COVID-19]), AMPLIFIED PROBE TECHNIQUE, MAKING USE OF HIGH THROUGHPUT TECHNOLOGIES AS DESCRIBED BY CMS-2020-01-R: HCPCS

## 2021-12-17 PROCEDURE — U0005 INFEC AGEN DETEC AMPLI PROBE: HCPCS

## 2021-12-17 PROCEDURE — 80320 DRUG SCREEN QUANTALCOHOLS: CPT

## 2021-12-17 RX ORDER — HYDROCODONE BITARTRATE AND ACETAMINOPHEN 5; 325 MG/1; MG/1
1 TABLET ORAL AT BEDTIME
Qty: 10 TABLET | Refills: 0 | Status: ON HOLD | OUTPATIENT
Start: 2021-12-21 | End: 2021-12-23

## 2021-12-17 NOTE — PROGRESS NOTES
Urine Drug Test: 12/17/2021     Medication: Norco and Medical Cannabis  Last dose of scheduled / tracked / medical cannabis / CBD: Norco at 9 pm on 12/16/2021 and Medical Cannabis at 9 am on 12/17/2021    Witnessed Patch Location: N/A    Medication Current List    Current Outpatient Medications:      albuterol (PROVENTIL) (2.5 MG/3ML) 0.083% neb solution, Take 1 vial (2.5 mg) by nebulization every 4 hours as needed for shortness of breath / dyspnea or wheezing, Disp: 360 mL, Rfl: 5     albuterol (VENTOLIN HFA) 108 (90 Base) MCG/ACT inhaler, Inhale 2 puffs into the lungs every 6 hours, Disp: 18 g, Rfl: 11     Cholecalciferol (VITAMIN D3) 250 MCG (87096 UT) TABS, Take 1 tablet by mouth daily 97214/day, Disp: , Rfl:      Cyanocobalamin (VITAMIN B-12) 5000 MCG SUBL, Unknown dose. 2 or 3 sprays/day, Disp: , Rfl:      ethacrynic acid (EDECRIN) 25 MG tablet, Take 1 tablet by mouth on Saturday and Wednesday, Disp: 30 tablet, Rfl: 11     Fluticasone-Umeclidin-Vilanterol (TRELEGY ELLIPTA) 200-62.5-25 MCG/INH oral inhaler, Inhale 1 puff into the lungs daily, Disp: 1 each, Rfl: 11     HYDROcodone-acetaminophen (NORCO) 5-325 MG tablet, Take 1 tablet by mouth At Bedtime For chronic pain, Disp: 10 tablet, Rfl: 0     losartan (COZAAR) 25 MG tablet, Take 1 tablet (25 mg) by mouth daily, Disp: 90 tablet, Rfl: 2     magnesium 250 MG tablet, Take 1 tablet by mouth 2 times daily, Disp: , Rfl:      medical cannabis (Patient's own supply), See Admin Instructions (The purpose of this order is to document that the patient reports taking medical cannabis.  This is not a prescription, and is not used to certify that the patient has a qualifying medical condition.), Disp: , Rfl:      methocarbamol (ROBAXIN) 500 MG tablet, Take 1 to 2 tablets at bedtime, Disp: 60 tablet, Rfl: 0     Multiple Vitamins-Iron (MULTIVITAMIN PLUS IRON ADULT) TABS, Take 4 tablets by mouth daily, Disp: 120 tablet, Rfl: 0     omeprazole (PRILOSEC) 20 MG DR capsule,  daily, Disp: , Rfl:      OXcarbazepine (TRILEPTAL) 150 MG tablet, One-half tab bedtime 3 nights, one-half tab twice a day 5 days, one twice a day (Patient taking differently: 2 times daily One-half tab twice a day), Disp: 30 tablet, Rfl: 3     potassium chloride ER (KLOR-CON M) 20 MEQ CR tablet, Take 1 tablet (20 mEq) by mouth daily, Disp: 90 tablet, Rfl: 3     PREBIOTIC PRODUCT PO, Take by mouth daily , Disp: , Rfl:      rOPINIRole (REQUIP) 2 MG tablet, , Disp: , Rfl:      SYNTHROID 150 MCG tablet, Take 1 tablet (150 mcg) by mouth daily, Disp: 90 tablet, Rfl: 4     Turmeric Curcumin 500 MG CAPS, , Disp: , Rfl:      vitamin (B COMPLEX-C) tablet, Take 1 tablet by mouth daily, Disp: , Rfl:      vitamin E 400 units TABS, Take 800 Units by mouth daily, Disp: , Rfl:     Personal belongings: Left outside the bathroom.    Comments:

## 2021-12-17 NOTE — PATIENT INSTRUCTIONS
To OR as planned. Please arrive at [facility] at 11 am on Tuesday December 21st.     *Bring your collar with you to the hospital.   *Bring your CPAP with you to the hospital.     Nothing to eat or drink after midnight the night before surgery. Take allowed medications with a sip of water.     Bring all pertinent films to hospital the day of surgery.     Continue to refrain from NSAIDS (Ibuprofen, Aleve, Naprosyn), ASA, Over the counter herbal medications or supplements, anti-coagulants and blood thinners.    Hibiclens shower:  Use one packet on Monday night and one packet on Tuesday morning for a whole body shower. Avoid face, hair and genitals.

## 2021-12-17 NOTE — TELEPHONE ENCOUNTER
Received call from patient requesting refill(s) of Norco     Last dispensed from pharmacy on 12/11/21    Patient's last office/virtual visit by prescribing provider on 10/21/21  Next office/virtual appointment scheduled for 12/22/21    Last urine drug screen date 9/2020 (12/17/21 in process)  Current opioid agreement on file (completed within the last year) Yes Date of opioid agreement: 9/2020 (12/17/21 in process)    E-prescribe to [facility] pharmacy    Will route to nursing Olpe for review and preparation of prescription(s).   Pending Prescriptions:                       Disp   Refills    HYDROcodone-acetaminophen (NORCO) 5-325 M*10 tab*0            Sig: Take 1 tablet by mouth At Bedtime for 10 days For           chronic pain

## 2021-12-17 NOTE — PROGRESS NOTES
Preop Assessment: Randi Cleary presents for pre-op review.  Surgeon: Colt  Name of Surgery: CERVICAL 3-CERVICAL 6 LEFT OPEN DOOR LAMINOPLASTY AND LEFT CERVICAL 4 POSTERIOR FORAMINOTOMY  Diagnosis: spinal stenosis in cervical region; cervical radiculopathy   Date of Surgery: 12/21/2021  Time of Surgery: [facility]  Hospital: [facility]  H&P: 12/13/2021 cleared for surgery   Stress test/cardiology clearance 11/16/2021  History of ASA, NSAIDS, vitamin and/or herbal supplements within 10 days: No  History of blood thinners: No  History of anti-seizure med's: No  Review of systems: Pt is a chronic pain patient. She has been on narcotics for 20 years and has a high opioid tolerance. Pt has admitted mental health difficulties.     Diagnostics:  Labs: WNL  CXR: n/a  EKG: SR with Right BBB (10/1/2021)  Other: Mariya Clark to OR  Films: MRI 01/29/2021     Nausea or Vomiting - denies  Urinary retention - denies  Pain management - chronic pain pt, managed by Dr. Dubois   Home PT Evaluation: ambulates independently with a cane or walker, wide gait and stride, no imbalance noted  - no indication for Home PT pre-op    Patient will have help at home from time of discharge through 1/3/2022 which is when her  goes back to work. After this, she is expecting home health care and PT/OT to come to her home.   *Of note, pt has requested that orders be placed for an in home hospital bed, walker, and shower chair during discharge planning.     All questions answered regarding surgery and expected pre and postoperative course including rehabilitation phase.     Reviewed with patient: Arrive 2.5 -3 hours prior to scheduled surgery, nothing to eat or drink after midnight the night before surgery and bring all pertinent films to the hospital the day of surgery.  Continue to refrain from NSAIDS (Ibuprofen, Aleve, Naprosyn) ASA or over the counter herbal medication or supplements, anticoagulants and blood thinners.    Preop skin  preparations and instructions provided.    Patient was informed that we will provide up to 6-8 weeks prescription of pain medication for post-operative pain and then Dr. Dubois will need to resume prescribing for her.     Discussed with patient the following: after your surgery, if you will be staying in-patient, a nursing team will be monitoring you closely throughout your stay and communicate your health status to your surgeon and other providers.  You will be seen by Advanced Practice Providers (e.g., nurse practitioners, clinic nurse specialist, and physician assistants) who will check on you regularly to assess the status of your surgery.   Pt was informed that we will call her after discharge to schedule her post-op appointments.   Sowmya Oshea RN

## 2021-12-17 NOTE — TELEPHONE ENCOUNTER
Patient came in for a UDS appointment and requested that a prescription for Hydrocodone be electronically sent to her pharmacy

## 2021-12-18 ENCOUNTER — HEALTH MAINTENANCE LETTER (OUTPATIENT)
Age: 66
End: 2021-12-18

## 2021-12-18 LAB
BARBITURATES UR QL: NORMAL
ETHANOL UR QL SCN: NORMAL
SARS-COV-2 RNA RESP QL NAA+PROBE: NEGATIVE

## 2021-12-21 ENCOUNTER — HOSPITAL ENCOUNTER (INPATIENT)
Facility: CLINIC | Age: 66
LOS: 4 days | Discharge: HOME HEALTH | DRG: 519 | End: 2021-12-25
Attending: SURGERY | Admitting: SURGERY
Payer: MEDICARE

## 2021-12-21 ENCOUNTER — APPOINTMENT (OUTPATIENT)
Dept: RADIOLOGY | Facility: CLINIC | Age: 66
DRG: 519 | End: 2021-12-21
Attending: NURSE PRACTITIONER
Payer: MEDICARE

## 2021-12-21 ENCOUNTER — ANESTHESIA EVENT (OUTPATIENT)
Dept: SURGERY | Facility: CLINIC | Age: 66
DRG: 519 | End: 2021-12-21
Payer: MEDICARE

## 2021-12-21 ENCOUNTER — ANESTHESIA (OUTPATIENT)
Dept: SURGERY | Facility: CLINIC | Age: 66
DRG: 519 | End: 2021-12-21
Payer: MEDICARE

## 2021-12-21 DIAGNOSIS — G95.20 CORD COMPRESSION (H): Primary | Chronic | ICD-10-CM

## 2021-12-21 DIAGNOSIS — M54.12 CERVICAL RADICULOPATHY: Chronic | ICD-10-CM

## 2021-12-21 PROBLEM — Z98.890 HISTORY OF CERVICAL SPINAL SURGERY: Status: ACTIVE | Noted: 2021-12-21

## 2021-12-21 PROBLEM — Z98.890 HISTORY OF CERVICAL SPINAL SURGERY: Chronic | Status: ACTIVE | Noted: 2021-12-21

## 2021-12-21 LAB
DHC UR CFM-MCNC: 77 NG/ML
DHC/CREAT UR: 133 NG/MG {CREAT}
HYDROCODONE UR CFM-MCNC: 71 NG/ML
HYDROCODONE/CREAT UR: 122 NG/MG {CREAT}

## 2021-12-21 PROCEDURE — 250N000009 HC RX 250: Performed by: NURSE ANESTHETIST, CERTIFIED REGISTERED

## 2021-12-21 PROCEDURE — 250N000009 HC RX 250

## 2021-12-21 PROCEDURE — 120N000001 HC R&B MED SURG/OB

## 2021-12-21 PROCEDURE — 01N10ZZ RELEASE CERVICAL NERVE, OPEN APPROACH: ICD-10-PCS | Performed by: SURGERY

## 2021-12-21 PROCEDURE — 250N000011 HC RX IP 250 OP 636: Performed by: NURSE PRACTITIONER

## 2021-12-21 PROCEDURE — P9041 ALBUMIN (HUMAN),5%, 50ML: HCPCS | Performed by: NURSE PRACTITIONER

## 2021-12-21 PROCEDURE — 250N000011 HC RX IP 250 OP 636: Performed by: NURSE ANESTHETIST, CERTIFIED REGISTERED

## 2021-12-21 PROCEDURE — 258N000003 HC RX IP 258 OP 636: Performed by: ANESTHESIOLOGY

## 2021-12-21 PROCEDURE — 0PH304Z INSERTION OF INTERNAL FIXATION DEVICE INTO CERVICAL VERTEBRA, OPEN APPROACH: ICD-10-PCS | Performed by: SURGERY

## 2021-12-21 PROCEDURE — 4A11X4G MONITORING OF PERIPHERAL NERVOUS ELECTRICAL ACTIVITY, INTRAOPERATIVE, EXTERNAL APPROACH: ICD-10-PCS | Performed by: SURGERY

## 2021-12-21 PROCEDURE — 250N000013 HC RX MED GY IP 250 OP 250 PS 637: Performed by: NURSE PRACTITIONER

## 2021-12-21 PROCEDURE — 999N000063 XR CROSSTABLE LATERAL CERVICAL SPINE PORTABLE

## 2021-12-21 PROCEDURE — 250N000011 HC RX IP 250 OP 636: Performed by: ANESTHESIOLOGY

## 2021-12-21 PROCEDURE — 99231 SBSQ HOSP IP/OBS SF/LOW 25: CPT | Performed by: INTERNAL MEDICINE

## 2021-12-21 PROCEDURE — 710N000010 HC RECOVERY PHASE 1, LEVEL 2, PER MIN: Performed by: SURGERY

## 2021-12-21 PROCEDURE — 250N000005 HC OR RX SURGIFLO HEMOSTATIC MATRIX 10ML 199102S OPNP: Performed by: SURGERY

## 2021-12-21 PROCEDURE — 370N000017 HC ANESTHESIA TECHNICAL FEE, PER MIN: Performed by: SURGERY

## 2021-12-21 PROCEDURE — 250N000009 HC RX 250: Performed by: SURGERY

## 2021-12-21 PROCEDURE — 63015 REMOVE SPINE LAMINA >2 CRVCL: CPT | Performed by: SURGERY

## 2021-12-21 PROCEDURE — 258N000003 HC RX IP 258 OP 636: Performed by: NURSE ANESTHETIST, CERTIFIED REGISTERED

## 2021-12-21 PROCEDURE — 63015 REMOVE SPINE LAMINA >2 CRVCL: CPT | Mod: AS | Performed by: NURSE PRACTITIONER

## 2021-12-21 PROCEDURE — 250N000013 HC RX MED GY IP 250 OP 250 PS 637: Performed by: ANESTHESIOLOGY

## 2021-12-21 PROCEDURE — 999N000141 HC STATISTIC PRE-PROCEDURE NURSING ASSESSMENT: Performed by: SURGERY

## 2021-12-21 PROCEDURE — 272N000001 HC OR GENERAL SUPPLY STERILE: Performed by: SURGERY

## 2021-12-21 PROCEDURE — C1713 ANCHOR/SCREW BN/BN,TIS/BN: HCPCS | Performed by: SURGERY

## 2021-12-21 PROCEDURE — 360N000077 HC SURGERY LEVEL 4, PER MIN: Performed by: SURGERY

## 2021-12-21 PROCEDURE — 250N000025 HC SEVOFLURANE, PER MIN: Performed by: SURGERY

## 2021-12-21 PROCEDURE — 00NW0ZZ RELEASE CERVICAL SPINAL CORD, OPEN APPROACH: ICD-10-PCS | Performed by: SURGERY

## 2021-12-21 PROCEDURE — 250N000013 HC RX MED GY IP 250 OP 250 PS 637: Performed by: SURGERY

## 2021-12-21 DEVICE — IMP PLATE SYN ADAPTION 90MM 20H TI 446.10: Type: IMPLANTABLE DEVICE | Site: NECK | Status: FUNCTIONAL

## 2021-12-21 DEVICE — IMP SCR SYN CORTEX 1.5X05MM W/PLUSDRIVE TI 400.055: Type: IMPLANTABLE DEVICE | Site: NECK | Status: FUNCTIONAL

## 2021-12-21 DEVICE — IMP SCR SYN CORTEX 1.5X04MM W/PLUSDRIVE TI 400.054E: Type: IMPLANTABLE DEVICE | Site: NECK | Status: FUNCTIONAL

## 2021-12-21 RX ORDER — LOSARTAN POTASSIUM 25 MG/1
25 TABLET ORAL DAILY
Status: DISCONTINUED | OUTPATIENT
Start: 2021-12-21 | End: 2021-12-25 | Stop reason: HOSPADM

## 2021-12-21 RX ORDER — AMOXICILLIN 250 MG
1 CAPSULE ORAL 2 TIMES DAILY
Status: DISCONTINUED | OUTPATIENT
Start: 2021-12-21 | End: 2021-12-21

## 2021-12-21 RX ORDER — MAGNESIUM 200 MG
1000 TABLET ORAL DAILY
Status: DISCONTINUED | OUTPATIENT
Start: 2021-12-21 | End: 2021-12-25 | Stop reason: HOSPADM

## 2021-12-21 RX ORDER — OXYCODONE HYDROCHLORIDE 5 MG/1
10 TABLET ORAL EVERY 4 HOURS PRN
Status: DISCONTINUED | OUTPATIENT
Start: 2021-12-21 | End: 2021-12-22

## 2021-12-21 RX ORDER — ROPINIROLE 1 MG/1
2-4 TABLET, FILM COATED ORAL AT BEDTIME
Status: DISCONTINUED | OUTPATIENT
Start: 2021-12-21 | End: 2021-12-25 | Stop reason: HOSPADM

## 2021-12-21 RX ORDER — SODIUM CHLORIDE, SODIUM LACTATE, POTASSIUM CHLORIDE, CALCIUM CHLORIDE 600; 310; 30; 20 MG/100ML; MG/100ML; MG/100ML; MG/100ML
INJECTION, SOLUTION INTRAVENOUS CONTINUOUS
Status: DISCONTINUED | OUTPATIENT
Start: 2021-12-21 | End: 2021-12-21 | Stop reason: HOSPADM

## 2021-12-21 RX ORDER — POLYETHYLENE GLYCOL 3350 17 G/17G
17 POWDER, FOR SOLUTION ORAL DAILY
Status: DISCONTINUED | OUTPATIENT
Start: 2021-12-21 | End: 2021-12-25 | Stop reason: HOSPADM

## 2021-12-21 RX ORDER — NALOXONE HYDROCHLORIDE 0.4 MG/ML
0.4 INJECTION, SOLUTION INTRAMUSCULAR; INTRAVENOUS; SUBCUTANEOUS
Status: DISCONTINUED | OUTPATIENT
Start: 2021-12-21 | End: 2021-12-25 | Stop reason: HOSPADM

## 2021-12-21 RX ORDER — OXCARBAZEPINE 150 MG/1
75 TABLET, FILM COATED ORAL DAILY
Status: DISCONTINUED | OUTPATIENT
Start: 2021-12-21 | End: 2021-12-25 | Stop reason: HOSPADM

## 2021-12-21 RX ORDER — ACETAMINOPHEN 325 MG/1
975 TABLET ORAL EVERY 8 HOURS
Status: DISCONTINUED | OUTPATIENT
Start: 2021-12-21 | End: 2021-12-24

## 2021-12-21 RX ORDER — ALBUTEROL SULFATE 0.83 MG/ML
2.5 SOLUTION RESPIRATORY (INHALATION) EVERY 4 HOURS PRN
Status: DISCONTINUED | OUTPATIENT
Start: 2021-12-21 | End: 2021-12-25 | Stop reason: HOSPADM

## 2021-12-21 RX ORDER — AMOXICILLIN 250 MG
1 CAPSULE ORAL 2 TIMES DAILY
Status: DISCONTINUED | OUTPATIENT
Start: 2021-12-21 | End: 2021-12-25 | Stop reason: HOSPADM

## 2021-12-21 RX ORDER — LIDOCAINE 40 MG/G
CREAM TOPICAL
Status: DISCONTINUED | OUTPATIENT
Start: 2021-12-21 | End: 2021-12-21 | Stop reason: HOSPADM

## 2021-12-21 RX ORDER — NALOXONE HYDROCHLORIDE 0.4 MG/ML
0.2 INJECTION, SOLUTION INTRAMUSCULAR; INTRAVENOUS; SUBCUTANEOUS
Status: DISCONTINUED | OUTPATIENT
Start: 2021-12-21 | End: 2021-12-25 | Stop reason: HOSPADM

## 2021-12-21 RX ORDER — ONDANSETRON 4 MG/1
4 TABLET, ORALLY DISINTEGRATING ORAL EVERY 30 MIN PRN
Status: DISCONTINUED | OUTPATIENT
Start: 2021-12-21 | End: 2021-12-21 | Stop reason: HOSPADM

## 2021-12-21 RX ORDER — DIAZEPAM 5 MG
5 TABLET ORAL ONCE
Status: COMPLETED | OUTPATIENT
Start: 2021-12-21 | End: 2021-12-21

## 2021-12-21 RX ORDER — HYDROMORPHONE HCL IN WATER/PF 6 MG/30 ML
0.4 PATIENT CONTROLLED ANALGESIA SYRINGE INTRAVENOUS EVERY 5 MIN PRN
Status: DISCONTINUED | OUTPATIENT
Start: 2021-12-21 | End: 2021-12-21 | Stop reason: HOSPADM

## 2021-12-21 RX ORDER — CLINDAMYCIN PHOSPHATE 900 MG/50ML
900 INJECTION, SOLUTION INTRAVENOUS SEE ADMIN INSTRUCTIONS
Status: DISCONTINUED | OUTPATIENT
Start: 2021-12-21 | End: 2021-12-21 | Stop reason: HOSPADM

## 2021-12-21 RX ORDER — AMOXICILLIN 250 MG
2 CAPSULE ORAL 2 TIMES DAILY
Status: DISCONTINUED | OUTPATIENT
Start: 2021-12-21 | End: 2021-12-25 | Stop reason: HOSPADM

## 2021-12-21 RX ORDER — ALBUTEROL SULFATE 90 UG/1
2 AEROSOL, METERED RESPIRATORY (INHALATION) EVERY 6 HOURS
Status: DISCONTINUED | OUTPATIENT
Start: 2021-12-21 | End: 2021-12-25 | Stop reason: HOSPADM

## 2021-12-21 RX ORDER — FENTANYL CITRATE 50 UG/ML
INJECTION, SOLUTION INTRAMUSCULAR; INTRAVENOUS PRN
Status: DISCONTINUED | OUTPATIENT
Start: 2021-12-21 | End: 2021-12-21

## 2021-12-21 RX ORDER — VITAMIN E 268 MG
800 CAPSULE ORAL DAILY
Status: DISCONTINUED | OUTPATIENT
Start: 2021-12-22 | End: 2021-12-25 | Stop reason: HOSPADM

## 2021-12-21 RX ORDER — MULTIVITAMIN,THERAPEUTIC
1 TABLET ORAL DAILY
Status: DISCONTINUED | OUTPATIENT
Start: 2021-12-22 | End: 2021-12-21

## 2021-12-21 RX ORDER — DEXAMETHASONE SODIUM PHOSPHATE 10 MG/ML
10 INJECTION, SOLUTION INTRAMUSCULAR; INTRAVENOUS ONCE
Status: COMPLETED | OUTPATIENT
Start: 2021-12-21 | End: 2021-12-21

## 2021-12-21 RX ORDER — SODIUM CHLORIDE 9 MG/ML
INJECTION, SOLUTION INTRAVENOUS CONTINUOUS PRN
Status: DISCONTINUED | OUTPATIENT
Start: 2021-12-21 | End: 2021-12-21

## 2021-12-21 RX ORDER — HYDRALAZINE HYDROCHLORIDE 20 MG/ML
2.5-5 INJECTION INTRAMUSCULAR; INTRAVENOUS EVERY 10 MIN PRN
Status: DISCONTINUED | OUTPATIENT
Start: 2021-12-21 | End: 2021-12-21 | Stop reason: HOSPADM

## 2021-12-21 RX ORDER — ALBUMIN, HUMAN INJ 5% 5 %
12.5 SOLUTION INTRAVENOUS ONCE
Status: COMPLETED | OUTPATIENT
Start: 2021-12-21 | End: 2021-12-21

## 2021-12-21 RX ORDER — HYDROMORPHONE HCL IN WATER/PF 6 MG/30 ML
0.2 PATIENT CONTROLLED ANALGESIA SYRINGE INTRAVENOUS EVERY 5 MIN PRN
Status: DISCONTINUED | OUTPATIENT
Start: 2021-12-21 | End: 2021-12-21

## 2021-12-21 RX ORDER — ONDANSETRON 2 MG/ML
4 INJECTION INTRAMUSCULAR; INTRAVENOUS EVERY 6 HOURS PRN
Status: DISCONTINUED | OUTPATIENT
Start: 2021-12-21 | End: 2021-12-25 | Stop reason: HOSPADM

## 2021-12-21 RX ORDER — FENTANYL CITRATE 50 UG/ML
50 INJECTION, SOLUTION INTRAMUSCULAR; INTRAVENOUS EVERY 5 MIN PRN
Status: DISCONTINUED | OUTPATIENT
Start: 2021-12-21 | End: 2021-12-21 | Stop reason: HOSPADM

## 2021-12-21 RX ORDER — POLYETHYLENE GLYCOL 3350 17 G/17G
17 POWDER, FOR SOLUTION ORAL DAILY
Status: DISCONTINUED | OUTPATIENT
Start: 2021-12-22 | End: 2021-12-21

## 2021-12-21 RX ORDER — METHOCARBAMOL 500 MG/1
1000 TABLET, FILM COATED ORAL
Status: DISCONTINUED | OUTPATIENT
Start: 2021-12-21 | End: 2021-12-23

## 2021-12-21 RX ORDER — PHENYLEPHRINE HCL IN 0.9% NACL 50MG/250ML
.5-1.25 PLASTIC BAG, INJECTION (ML) INTRAVENOUS CONTINUOUS
Status: DISCONTINUED | OUTPATIENT
Start: 2021-12-21 | End: 2021-12-23

## 2021-12-21 RX ORDER — OXYCODONE HYDROCHLORIDE 5 MG/1
5 TABLET ORAL EVERY 4 HOURS PRN
Status: DISCONTINUED | OUTPATIENT
Start: 2021-12-21 | End: 2021-12-21 | Stop reason: HOSPADM

## 2021-12-21 RX ORDER — HYDROMORPHONE HYDROCHLORIDE 1 MG/ML
.5-1 INJECTION, SOLUTION INTRAMUSCULAR; INTRAVENOUS; SUBCUTANEOUS
Status: DISCONTINUED | OUTPATIENT
Start: 2021-12-21 | End: 2021-12-22

## 2021-12-21 RX ORDER — CLINDAMYCIN PHOSPHATE 900 MG/50ML
900 INJECTION, SOLUTION INTRAVENOUS
Status: COMPLETED | OUTPATIENT
Start: 2021-12-21 | End: 2021-12-21

## 2021-12-21 RX ORDER — FAMOTIDINE 20 MG/1
20 TABLET, FILM COATED ORAL ONCE
Status: COMPLETED | OUTPATIENT
Start: 2021-12-21 | End: 2021-12-21

## 2021-12-21 RX ORDER — BACITRACIN ZINC 500 [USP'U]/G
OINTMENT TOPICAL PRN
Status: DISCONTINUED | OUTPATIENT
Start: 2021-12-21 | End: 2021-12-21 | Stop reason: HOSPADM

## 2021-12-21 RX ORDER — SODIUM CHLORIDE 9 MG/ML
INJECTION, SOLUTION INTRAVENOUS CONTINUOUS
Status: DISCONTINUED | OUTPATIENT
Start: 2021-12-21 | End: 2021-12-23

## 2021-12-21 RX ORDER — BISACODYL 10 MG
10 SUPPOSITORY, RECTAL RECTAL DAILY PRN
Status: DISCONTINUED | OUTPATIENT
Start: 2021-12-21 | End: 2021-12-25 | Stop reason: HOSPADM

## 2021-12-21 RX ORDER — LIDOCAINE HYDROCHLORIDE 20 MG/ML
INJECTION, SOLUTION INFILTRATION; PERINEURAL PRN
Status: DISCONTINUED | OUTPATIENT
Start: 2021-12-21 | End: 2021-12-21

## 2021-12-21 RX ORDER — ONDANSETRON 2 MG/ML
4 INJECTION INTRAMUSCULAR; INTRAVENOUS EVERY 30 MIN PRN
Status: DISCONTINUED | OUTPATIENT
Start: 2021-12-21 | End: 2021-12-21 | Stop reason: HOSPADM

## 2021-12-21 RX ORDER — MULTIVIT/IRON SULF/FOLIC ACID 15MG-0.4MG
2 TABLET ORAL DAILY
Status: DISCONTINUED | OUTPATIENT
Start: 2021-12-22 | End: 2021-12-25 | Stop reason: CLARIF

## 2021-12-21 RX ORDER — PROPOFOL 10 MG/ML
INJECTION, EMULSION INTRAVENOUS CONTINUOUS PRN
Status: DISCONTINUED | OUTPATIENT
Start: 2021-12-21 | End: 2021-12-21

## 2021-12-21 RX ORDER — ONDANSETRON 4 MG/1
4 TABLET, ORALLY DISINTEGRATING ORAL EVERY 6 HOURS PRN
Status: DISCONTINUED | OUTPATIENT
Start: 2021-12-21 | End: 2021-12-25 | Stop reason: HOSPADM

## 2021-12-21 RX ORDER — POTASSIUM CHLORIDE 1500 MG/1
20 TABLET, EXTENDED RELEASE ORAL DAILY
Status: DISCONTINUED | OUTPATIENT
Start: 2021-12-21 | End: 2021-12-25 | Stop reason: HOSPADM

## 2021-12-21 RX ORDER — PROPOFOL 10 MG/ML
INJECTION, EMULSION INTRAVENOUS PRN
Status: DISCONTINUED | OUTPATIENT
Start: 2021-12-21 | End: 2021-12-21

## 2021-12-21 RX ORDER — PROCHLORPERAZINE MALEATE 5 MG
5 TABLET ORAL EVERY 6 HOURS PRN
Status: DISCONTINUED | OUTPATIENT
Start: 2021-12-21 | End: 2021-12-25 | Stop reason: HOSPADM

## 2021-12-21 RX ORDER — ONDANSETRON 2 MG/ML
INJECTION INTRAMUSCULAR; INTRAVENOUS PRN
Status: DISCONTINUED | OUTPATIENT
Start: 2021-12-21 | End: 2021-12-21

## 2021-12-21 RX ORDER — LABETALOL HYDROCHLORIDE 5 MG/ML
10 INJECTION, SOLUTION INTRAVENOUS
Status: DISCONTINUED | OUTPATIENT
Start: 2021-12-21 | End: 2021-12-21 | Stop reason: HOSPADM

## 2021-12-21 RX ORDER — HYDROXYZINE HYDROCHLORIDE 10 MG/1
10 TABLET, FILM COATED ORAL EVERY 6 HOURS PRN
Status: DISCONTINUED | OUTPATIENT
Start: 2021-12-21 | End: 2021-12-22

## 2021-12-21 RX ORDER — LEVOTHYROXINE SODIUM 75 UG/1
150 TABLET ORAL DAILY
Status: DISCONTINUED | OUTPATIENT
Start: 2021-12-22 | End: 2021-12-25 | Stop reason: HOSPADM

## 2021-12-21 RX ORDER — PANTOPRAZOLE SODIUM 20 MG/1
40 TABLET, DELAYED RELEASE ORAL
Status: DISCONTINUED | OUTPATIENT
Start: 2021-12-22 | End: 2021-12-25 | Stop reason: HOSPADM

## 2021-12-21 RX ORDER — DIAZEPAM 5 MG
5 TABLET ORAL EVERY 4 HOURS PRN
Status: DISCONTINUED | OUTPATIENT
Start: 2021-12-21 | End: 2021-12-22

## 2021-12-21 RX ORDER — HALOPERIDOL 5 MG/ML
1 INJECTION INTRAMUSCULAR
Status: DISCONTINUED | OUTPATIENT
Start: 2021-12-21 | End: 2021-12-21 | Stop reason: HOSPADM

## 2021-12-21 RX ORDER — DEXMEDETOMIDINE HYDROCHLORIDE 4 UG/ML
INJECTION, SOLUTION INTRAVENOUS CONTINUOUS PRN
Status: DISCONTINUED | OUTPATIENT
Start: 2021-12-21 | End: 2021-12-21

## 2021-12-21 RX ORDER — ACETAMINOPHEN 325 MG/1
650 TABLET ORAL EVERY 4 HOURS PRN
Status: DISCONTINUED | OUTPATIENT
Start: 2021-12-24 | End: 2021-12-24

## 2021-12-21 RX ORDER — VIT B COMP NO.3/FOLIC/C/BIOTIN 1 MG-60 MG
1 TABLET ORAL DAILY
Status: DISCONTINUED | OUTPATIENT
Start: 2021-12-22 | End: 2021-12-25 | Stop reason: HOSPADM

## 2021-12-21 RX ORDER — LORATADINE 10 MG/1
10 TABLET ORAL DAILY PRN
Status: DISCONTINUED | OUTPATIENT
Start: 2021-12-21 | End: 2021-12-25 | Stop reason: HOSPADM

## 2021-12-21 RX ADMIN — MIDAZOLAM HYDROCHLORIDE 1 MG: 1 INJECTION, SOLUTION INTRAMUSCULAR; INTRAVENOUS at 17:37

## 2021-12-21 RX ADMIN — FENTANYL CITRATE 100 MCG: 50 INJECTION, SOLUTION INTRAMUSCULAR; INTRAVENOUS at 13:29

## 2021-12-21 RX ADMIN — HYDROMORPHONE HYDROCHLORIDE 0.4 MG: 0.2 INJECTION, SOLUTION INTRAMUSCULAR; INTRAVENOUS; SUBCUTANEOUS at 17:29

## 2021-12-21 RX ADMIN — PROPOFOL 100 MG: 10 INJECTION, EMULSION INTRAVENOUS at 13:23

## 2021-12-21 RX ADMIN — DEXAMETHASONE SODIUM PHOSPHATE 10 MG: 10 INJECTION, SOLUTION INTRAMUSCULAR; INTRAVENOUS at 13:23

## 2021-12-21 RX ADMIN — CLINDAMYCIN PHOSPHATE 900 MG: 900 INJECTION, SOLUTION INTRAVENOUS at 13:30

## 2021-12-21 RX ADMIN — HYDROMORPHONE HYDROCHLORIDE 1 MG: 1 INJECTION, SOLUTION INTRAMUSCULAR; INTRAVENOUS; SUBCUTANEOUS at 20:40

## 2021-12-21 RX ADMIN — PHENYLEPHRINE HYDROCHLORIDE 100 MCG: 10 INJECTION INTRAVENOUS at 13:38

## 2021-12-21 RX ADMIN — PROPOFOL 100 MCG/KG/MIN: 10 INJECTION, EMULSION INTRAVENOUS at 13:23

## 2021-12-21 RX ADMIN — HYDROMORPHONE HYDROCHLORIDE 1 MG: 1 INJECTION, SOLUTION INTRAMUSCULAR; INTRAVENOUS; SUBCUTANEOUS at 16:46

## 2021-12-21 RX ADMIN — SODIUM CHLORIDE: 900 INJECTION, SOLUTION INTRAVENOUS at 13:23

## 2021-12-21 RX ADMIN — ROPINIROLE 2 MG: 1 TABLET, FILM COATED ORAL at 20:51

## 2021-12-21 RX ADMIN — FENTANYL CITRATE 100 MCG: 50 INJECTION, SOLUTION INTRAMUSCULAR; INTRAVENOUS at 16:50

## 2021-12-21 RX ADMIN — LIDOCAINE HYDROCHLORIDE 20 MG: 20 INJECTION, SOLUTION INFILTRATION; PERINEURAL at 13:23

## 2021-12-21 RX ADMIN — ONDANSETRON 4 MG: 2 INJECTION INTRAMUSCULAR; INTRAVENOUS at 16:11

## 2021-12-21 RX ADMIN — FAMOTIDINE 20 MG: 20 TABLET ORAL at 12:02

## 2021-12-21 RX ADMIN — OXCARBAZEPINE 75 MG: 150 TABLET, FILM COATED ORAL at 21:50

## 2021-12-21 RX ADMIN — Medication 1000 MCG: at 20:50

## 2021-12-21 RX ADMIN — Medication 100 MG: at 13:23

## 2021-12-21 RX ADMIN — HYDROMORPHONE HYDROCHLORIDE 0.4 MG: 0.2 INJECTION, SOLUTION INTRAMUSCULAR; INTRAVENOUS; SUBCUTANEOUS at 17:17

## 2021-12-21 RX ADMIN — REMIFENTANIL HYDROCHLORIDE 0.2 MCG/KG/MIN: 1 INJECTION, POWDER, LYOPHILIZED, FOR SOLUTION INTRAVENOUS at 13:23

## 2021-12-21 RX ADMIN — HYDROMORPHONE HYDROCHLORIDE 0.4 MG: 0.2 INJECTION, SOLUTION INTRAMUSCULAR; INTRAVENOUS; SUBCUTANEOUS at 17:07

## 2021-12-21 RX ADMIN — SODIUM CHLORIDE, POTASSIUM CHLORIDE, SODIUM LACTATE AND CALCIUM CHLORIDE: 600; 310; 30; 20 INJECTION, SOLUTION INTRAVENOUS at 13:14

## 2021-12-21 RX ADMIN — Medication 0.2 MCG/KG/HR: at 13:23

## 2021-12-21 RX ADMIN — DIAZEPAM 5 MG: 5 TABLET ORAL at 17:51

## 2021-12-21 RX ADMIN — ACETAMINOPHEN 975 MG: 325 TABLET ORAL at 20:50

## 2021-12-21 RX ADMIN — POTASSIUM CHLORIDE 20 MEQ: 1500 TABLET, EXTENDED RELEASE ORAL at 20:49

## 2021-12-21 RX ADMIN — ROCURONIUM BROMIDE 15 MG: 10 INJECTION, SOLUTION INTRAVENOUS at 14:26

## 2021-12-21 RX ADMIN — FENTANYL CITRATE 100 MCG: 50 INJECTION, SOLUTION INTRAMUSCULAR; INTRAVENOUS at 13:23

## 2021-12-21 RX ADMIN — HYDROMORPHONE HYDROCHLORIDE 0.25 MG: 1 INJECTION, SOLUTION INTRAMUSCULAR; INTRAVENOUS; SUBCUTANEOUS at 16:15

## 2021-12-21 RX ADMIN — SENNOSIDES AND DOCUSATE SODIUM 1 TABLET: 50; 8.6 TABLET ORAL at 20:52

## 2021-12-21 RX ADMIN — FENTANYL CITRATE 100 MCG: 50 INJECTION, SOLUTION INTRAMUSCULAR; INTRAVENOUS at 16:53

## 2021-12-21 RX ADMIN — ALBUMIN HUMAN 12.5 G: 50 SOLUTION INTRAVENOUS at 12:17

## 2021-12-21 RX ADMIN — Medication 250 MCG: at 20:49

## 2021-12-21 RX ADMIN — SODIUM CHLORIDE, POTASSIUM CHLORIDE, SODIUM LACTATE AND CALCIUM CHLORIDE: 600; 310; 30; 20 INJECTION, SOLUTION INTRAVENOUS at 16:17

## 2021-12-21 RX ADMIN — LOSARTAN POTASSIUM 25 MG: 25 TABLET, FILM COATED ORAL at 20:51

## 2021-12-21 RX ADMIN — HYDROMORPHONE HYDROCHLORIDE 0.75 MG: 1 INJECTION, SOLUTION INTRAMUSCULAR; INTRAVENOUS; SUBCUTANEOUS at 16:42

## 2021-12-21 RX ADMIN — PHENYLEPHRINE HYDROCHLORIDE 0.2 MCG/KG/MIN: 10 INJECTION INTRAVENOUS at 13:38

## 2021-12-21 RX ADMIN — OXYCODONE HYDROCHLORIDE 10 MG: 5 TABLET ORAL at 23:21

## 2021-12-21 RX ADMIN — PHENYLEPHRINE HYDROCHLORIDE 100 MCG: 10 INJECTION INTRAVENOUS at 13:34

## 2021-12-21 ASSESSMENT — ACTIVITIES OF DAILY LIVING (ADL)
ADLS_ACUITY_SCORE: 14
ADLS_ACUITY_SCORE: 14
ADLS_ACUITY_SCORE: 13
ADLS_ACUITY_SCORE: 14
ADLS_ACUITY_SCORE: 13
ADLS_ACUITY_SCORE: 13
ADLS_ACUITY_SCORE: 14
ADLS_ACUITY_SCORE: 12
ADLS_ACUITY_SCORE: 13
ADLS_ACUITY_SCORE: 14

## 2021-12-21 ASSESSMENT — COPD QUESTIONNAIRES
COPD: 1
CAT_SEVERITY: MILD

## 2021-12-21 NOTE — PHARMACY-ADMISSION MEDICATION HISTORY
Pharmacy Note - Admission Medication History    Pertinent Provider Information:    ______________________________________________________________________    Prior To Admission (PTA) med list completed and updated in EMR.       PTA Med List   Medication Sig Last Dose     albuterol (PROVENTIL) (2.5 MG/3ML) 0.083% neb solution Take 1 vial (2.5 mg) by nebulization every 4 hours as needed for shortness of breath / dyspnea or wheezing Past Month at Unknown time     albuterol (VENTOLIN HFA) 108 (90 Base) MCG/ACT inhaler Inhale 2 puffs into the lungs every 6 hours Past Week at has with     Cholecalciferol (VITAMIN D3) 250 MCG (26506 UT) TABS Take 1 tablet by mouth daily 59924/day 12/20/2021 at Unknown time     Cyanocobalamin (VITAMIN B-12) 5000 MCG SUBL Place 2-3 sprays under the tongue daily Unknown dose. 2 or 3 sprays/day 12/20/2021 at Unknown time     ethacrynic acid (EDECRIN) 25 MG tablet Take 1 tablet by mouth on Saturday and Wednesday 12/19/2021 at has with     Fluticasone-Umeclidin-Vilanterol (TRELEGY ELLIPTA) 200-62.5-25 MCG/INH oral inhaler Inhale 1 puff into the lungs daily 12/21/2021 at has with     HYDROcodone-acetaminophen (NORCO) 5-325 MG tablet Take 1 tablet by mouth At Bedtime for 10 days For chronic pain 12/20/2021 at Unknown time     losartan (COZAAR) 25 MG tablet Take 1 tablet (25 mg) by mouth daily 12/20/2021 at Unknown time     magnesium 250 MG tablet Take 1 tablet by mouth 2 times daily 12/20/2021 at Unknown time     methocarbamol (ROBAXIN) 500 MG tablet Take 1 to 2 tablets at bedtime 12/20/2021 at Unknown time     Multiple Vitamins-Iron (MULTIVITAMIN PLUS IRON ADULT) TABS Take 4 tablets by mouth daily 12/14/2021     omeprazole (PRILOSEC) 20 MG DR capsule Take 20 mg by mouth daily  12/21/2021 at Unknown time     OXcarbazepine (TRILEPTAL) 150 MG tablet One-half tab bedtime 3 nights, one-half tab twice a day 5 days, one twice a day (Patient taking differently: Take 75 mg by mouth daily One-half tab twice  a day) 12/20/2021 at Unknown time     potassium chloride ER (KLOR-CON M) 20 MEQ CR tablet Take 1 tablet (20 mEq) by mouth daily 12/20/2021 at Unknown time     PREBIOTIC PRODUCT PO Take by mouth daily  12/20/2021 at Unknown time     rOPINIRole (REQUIP) 2 MG tablet Take 2-4 mg by mouth At Bedtime  12/20/2021 at Unknown time     SYNTHROID 150 MCG tablet Take 1 tablet (150 mcg) by mouth daily 12/21/2021 at has with     Turmeric Curcumin 500 MG CAPS Take 500 mg by mouth daily  12/12/2021     vitamin (B COMPLEX-C) tablet Take 1 tablet by mouth daily 12/12/2021     vitamin E 400 units TABS Take 800 Units by mouth daily 12/12/2021       Information source(s): Patient, Facility ([facility]/) medication list/MAR and CareEverywhere/SureScripts    Method of interview communication: in-person    Patient was asked about OTC/herbal products specifically.  PTA med list reflects this.    Based on the pharmacist's assessment, the PTA med list information appears reliable    Allergies were reviewed, assessed, and updated with the patient.      Medications available for use during hospital stay: Albuterol MDI, Trelegy, Synthroid (brand name).      Thank you for the opportunity to participate in the care of this patient.      Darshan Jenkins [facility]     12/21/2021     1:19 PM

## 2021-12-21 NOTE — ANESTHESIA PREPROCEDURE EVALUATION
Anesthesia Pre-Procedure Evaluation    Patient: Randi Cleary   MRN: [id number] : 1955        Preoperative Diagnosis: Spinal stenosis in cervical region [M48.02]  Cervical radiculopathy [M54.12]    Procedure : Procedure(s):  CERVICAL 3-CERVICAL 6 LEFT OPEN DOOR LAMINOPLASTY AND LEFT CERVICAL 4 POSTERIOR FORAMINOTOMY          Past Medical History:   Diagnosis Date     Anxiety      Anxiety      Bipolar disorder (H)      Breast cancer (H)     lumpectomy, radiation, chemo     Breast cancer (H)      Chronic pain syndrome      COPD (chronic obstructive pulmonary disease) (H)     asthma     COPD (chronic obstructive pulmonary disease) (H)      Depression      Depression, major, recurrent (H)      Depressive disorder     30+ years     Dizzy      Drug tolerance     opioid     Esophageal reflux      Esophageal reflux      Fatigue      Graves disease      Graves disease      Hemochromatosis 2018    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; H63D not detected     Hemochromatosis      History of breast cancer 2020    Formatting of this note might be different from the original. Created by Conversion  Replacement Utility updated for latest IMO load Formatting of this note might be different from the original. Created by Conversion  Replacement Utility updated for latest IMO load     History of corticosteroid therapy 2019     History of partial adrenalectomy (H) 2019     History of pheochromocytoma 2019     Hx antineoplastic chemotherapy      Hx of radiation therapy      Hyperlipidaemia      Hypertension      Hypertension      Impaired fasting glucose      Impaired fasting glucose      Injury of neck, whiplash 7/15/2021     Joint pain      Morbid obesity (H)      KYAW (obstructive sleep apnea) 2016     Osteopenia      Pheochromocytoma      Pheochromocytoma, left 2017    laparoscopically removed     Postablative hypothyroidism 1995     Prediabetes 10/03/2019    by A1c     Psoriasis       Psoriasis      Psoriatic arthropathy (H)      Psoriatic arthropathy (H)      Restless leg syndrome      Right rotator cuff tear      RLS (restless legs syndrome)     on ropinorole     Sacroiliitis (H)      Serotonin syndrome 08/28/2020    [facility]     Serotonin syndrome 8/28/2020     Sleep apnea 2017    CPAP     Snoring      Spinal stenosis      Spinal stenosis      Status post coronary angiogram 10/3/2019     Uncomplicated asthma      Urinary incontinence      Vitamin B 12 deficiency 2009     Vitamin B12 deficiency      Vitamin D deficiency 2010      Past Surgical History:   Procedure Laterality Date     ARTHRODESIS ANKLE       ARTHROPLASTY ANKLE Right 6/29/2015    Procedure: ARTHROPLASTY ANKLE;  Surgeon: Jason Coughlin MD;  Location: [facility]     ARTHROPLASTY REVISION ANKLE Right 6/29/2015    Procedure: ARTHROPLASTY REVISION ANKLE;  Surgeon: Jason Coughlin MD;  Location: [facility]     BIOPSY BREAST       BREAST BIOPSY, CORE RT/LT       C ARTHRODESIS,ANKLE,OPEN Right     Description: Ankle Arthrodesis;  Recorded: 04/07/2010;     COLONOSCOPY       COLONOSCOPY N/A 2/25/2021    Procedure: COLONOSCOPY;  Surgeon: Guru Elke Tolbert MD;  Location:  GI     CV CORONARY ANGIOGRAM N/A 10/3/2019    Procedure: CV CORONARY ANGIOGRAM;  Surgeon: Bryce Pierre MD;  Location:  HEART CARDIAC CATH LAB     CV RIGHT HEART CATH MEASUREMENTS RECORDED N/A 10/3/2019    Procedure: CV RIGHT HEART CATH;  Surgeon: Bryce Pierre MD;  Location:  HEART CARDIAC CATH LAB     ESOPHAGOSCOPY, GASTROSCOPY, DUODENOSCOPY (EGD), COMBINED N/A 2/25/2021    Procedure: ESOPHAGOGASTRODUODENOSCOPY, WITH BIOPSY;  Surgeon: Guru Elke Tolbert MD;  Location:  GI     EYE SURGERY  2021     HC REMOVE TONSILS/ADENOIDS,<11 Y/O      Description: Tonsillectomy With Adenoidectomy;  Recorded: 04/07/2010;     IR LUMBAR EPIDURAL STEROID INJECTION  10/26/2004     IR LUMBAR EPIDURAL STEROID INJECTION   "11/16/2004     IR LUMBAR EPIDURAL STEROID INJECTION  12/21/2004     IR LUMBAR EPIDURAL STEROID INJECTION  6/8/2006     JOINT REPLACEMENT       LAPAROSCOPIC ADRENALECTOMY Left 08/02/2017    pheochromocytoma     LAPAROSCOPIC ADRENALECTOMY Left 8/2/2017    Procedure: LAPAROSCOPIC LEFT ADRENALECTOMY, ;  Surgeon: Gab Linares MD;  [place] [facility]  Service:      LENGTHEN TENDON ACHILLES Right 6/29/2015    Procedure: LENGTHEN TENDON ACHILLES;  Surgeon: Jason Coughlin MD;  Location: [facility]     LUMPECTOMY BREAST       LUMPECTOMY BREAST Left 1994     MAMMOPLASTY REDUCTION Right 01/13/2015    [facility]     MAMMOPLASTY REDUCTION Right     approx late 2015/early2016     MASTECTOMY      left lumpectomy with axillary node dissection     MASTECTOMY MODIFIED RADICAL       OTHER SURGICAL HISTORY Right     reconstructive breast surgery     OTHER SURGICAL HISTORY      Adrenalectomy for pheochromocytoma     [place] MASTECTOMY, MODIFIED RADICAL      Description: Modified Radical Mastectomy Left Breast;  Recorded: 04/07/2010;     REPAIR HAMMER TOE Right 6/29/2015    Procedure: REPAIR HAMMER TOE;  Surgeon: Jason Coughlin MD;  Location: [facility]     TONSILLECTOMY       TONSILLECTOMY & ADENOIDECTOMY        Allergies   Allergen Reactions     Serotonin Reuptake Inhibitors Anxiety, Difficulty breathing, Headache, Palpitations and Shortness Of Breath     Buspirone      The patient states she had serotonin syndrome     Cephalexin      Other reaction(s): unknown rxn.     Desvenlafaxine      Serotonin syndrome     Diclofenac Sodium [Diclofenac]      Serotonin syndrome and restless legs syndrome     Gabapentin      Drove on the wrong side of the highway     Levofloxacin      \"CAN'T REMEMBER\"     Penicillins      \"SORES IN MOUTH\"     Riluzole Difficulty breathing and Swelling     Sulfa Drugs      \"PT DOES NOT KNOW WHAT THE REACTION WAS\"      Social History     Tobacco Use     Smoking status: Former Smoker     Packs/day: 2.50     Years: " 20.00     Pack years: 50.00     Types: Cigarettes     Start date: 1971     Quit date: 2000     Years since quittin.4     Smokeless tobacco: Never Used   Substance Use Topics     Alcohol use: Not Currently     Comment: relapse 2021 sober       Wt Readings from Last 1 Encounters:   21 98 kg (216 lb 1.6 oz)        Anesthesia Evaluation   Pt has had prior anesthetic.         ROS/MED HX  ENT/Pulmonary:     (+) sleep apnea, uses CPAP, mild,  COPD,     Neurologic: Comment: Cervical stenosis: BUE weakness and numbness      Cardiovascular: Comment: ECHO 2019:  LVEF 55-60%  Mod Pulm HTN, RV fxn mildly reduced    Stress test negative     (+) hypertension-----pulmonary hypertension, Previous cardiac testing     METS/Exercise Tolerance:     Hematologic:  - neg hematologic  ROS     Musculoskeletal:   (+) arthritis,     GI/Hepatic:     (+) GERD, Asymptomatic on medication,     Renal/Genitourinary:       Endo: Comment: S/p resection Pheochromocytoma    (+) Obesity,     Psychiatric/Substance Use:     (+) psychiatric history     Infectious Disease:       Malignancy:       Other:            Physical Exam    Airway        Mallampati: I   TM distance: > 3 FB   Neck ROM: limited   Mouth opening: > 3 cm    Respiratory Devices and Support         Dental     Comment: Missing 5 molars        Cardiovascular          Rhythm and rate: regular and normal     Pulmonary   pulmonary exam normal        breath sounds clear to auscultation           OUTSIDE LABS:  CBC:   Lab Results   Component Value Date    WBC 5.7 2021    WBC 7.4 2021    HGB 15.7 2021    HGB 16.5 (H) 2021    HCT 44.2 2021    HCT 47.7 (H) 2021     2021     2021     BMP:   Lab Results   Component Value Date     2021     2021    POTASSIUM 4.1 2021    POTASSIUM 4.1 2021    CHLORIDE 104 2021    CHLORIDE 110 (H) 2021    CO2 24 2021    CO2 26  11/16/2021    BUN 12 12/13/2021    BUN 13 11/16/2021    CR 0.64 12/13/2021    CR 0.57 11/16/2021     (H) 12/13/2021     (H) 11/16/2021     COAGS:   Lab Results   Component Value Date    PTT 34 12/13/2021    INR 0.94 12/13/2021     POC:   Lab Results   Component Value Date     (H) 02/25/2021     HEPATIC:   Lab Results   Component Value Date    ALBUMIN 3.4 07/13/2021    PROTTOTAL 7.0 07/13/2021    ALT 37 07/13/2021    AST 24 07/13/2021    ALKPHOS 73 07/13/2021    BILITOTAL 0.6 07/13/2021     OTHER:   Lab Results   Component Value Date    A1C 5.4 05/14/2021    LINDA 9.5 12/13/2021    TSH 1.31 05/18/2021    T4 1.57 (H) 02/05/2021    T3 130 02/05/2021    CRP <2.9 11/16/2021    SED 9 07/13/2021       Anesthesia Plan    ASA Status:  3      Anesthesia Type: General.     - Airway: ETT   Induction: Intravenous.   Maintenance: TIVA.   Techniques and Equipment:     - Airway: Video-Laryngoscope     - Lines/Monitors: 2nd IV, Arterial Line     - Drips/Meds: Remifentanil     Consents    Anesthesia Plan(s) and associated risks, benefits, and realistic alternatives discussed. Questions answered and patient/representative(s) expressed understanding.    - Discussed:     - Discussed with:  Patient         Postoperative Care    Pain management: Multi-modal analgesia.        Comments:                Katrina Olmstead MD

## 2021-12-21 NOTE — ANESTHESIA CARE TRANSFER NOTE
Patient: Randi Cleary    Procedure: Procedure(s):  CERVICAL 3-CERVICAL 6 LEFT OPEN DOOR LAMINOPLASTY AND LEFT CERVICAL 4-5 AND CERVICAL 6-7 POSTERIOR FORAMINOTOMY       Diagnosis: Spinal stenosis in cervical region [M48.02]  Cervical radiculopathy [M54.12]  Diagnosis Additional Information: No value filed.    Anesthesia Type:   General     Note:    Oropharynx: oropharynx clear of all foreign objects  Level of Consciousness: awake  Oxygen Supplementation: face mask  Level of Supplemental Oxygen (L/min / FiO2): 6  Independent Airway: airway patency satisfactory and stable  Dentition: dentition unchanged  Vital Signs Stable: post-procedure vital signs reviewed and stable  Report to RN Given: handoff report given  Patient transferred to: PACU    Handoff Report: Identifed the Patient, Identified the Reponsible Provider, Reviewed the pertinent medical history, Discussed the surgical course, Reviewed Intra-OP anesthesia mangement and issues during anesthesia, Set expectations for post-procedure period and Allowed opportunity for questions and acknowledgement of understanding      Vitals:  Vitals Value Taken Time   /61 12/21/21 1656   Temp 97.7f    Pulse 80 12/21/21 1655   Resp 17 12/21/21 1655   SpO2 98 % 12/21/21 1655   Vitals shown include unvalidated device data.    Electronically Signed By: DION Liz CRNA  December 21, 2021  4:57 PM

## 2021-12-21 NOTE — ANESTHESIA PROCEDURE NOTES
Arterial Line Procedure Note    Pre-Procedure   Staff -        Anesthesiologist:  Katrina Olmstead MD       Performed By: anesthesiologist       Location: OR       Procedure Start/Stop Times: 12/21/2021 1:36 PM and 12/21/2021 1:40 PM       Pre-Anesthestic Checklist: patient identified, IV checked, risks and benefits discussed, informed consent, monitors and equipment checked and pre-op evaluation  Timeout:       Correct Patient: Yes        Correct Procedure: Yes        Correct Site: Yes        Correct Position: Yes   Procedure   Procedure: arterial line and new line       Laterality: left       Insertion Site: radial.  Sterile Prep        Standard elements of sterile barrier followed       Skin prep: Chloraprep  Insertion/Injection        Catheter Type/Size: 20 G, 12 cm  Narrative        Tegaderm and Biopatch dressing used.       Complications: None apparent,        Arterial waveform: Yes        IBP within 10% of NIBP: Yes

## 2021-12-21 NOTE — BRIEF OP NOTE
[facility] Brief Operative Note    Pre-operative diagnosis: Spinal stenosis in cervical region [M48.02]  Cervical radiculopathy [M54.12]   Post-operative diagnosis same   Procedure: Procedure(s):  CERVICAL 3-CERVICAL 6 LEFT OPEN DOOR LAMINOPLASTY AND LEFT CERVICAL 4-5 AND CERVICAL 6-7 POSTERIOR FORAMINOTOMY   Surgeon(s): Surgeon(s) and Role:     * Angela Gregory MD - Primary     * Kassandra Tobin NP - Assisting   Estimated blood loss: 75 cc    Specimens: * No specimens in log *   Findings: Foraminal stenosis C4-5 and C6-7 on left. Neuro monitoring stable throughout procedure

## 2021-12-21 NOTE — ANESTHESIA PROCEDURE NOTES
Airway       Patient location during procedure: OR       Procedure Start/Stop Times: 12/21/2021 1:24 PM  Staff -        CRNA: Aleida Hayes APRN CRNA       Performed By: CRNAIndications and Patient Condition       Indications for airway management: pepe-procedural       Induction type:intravenous       Mask difficulty assessment: 1 - vent by mask    Final Airway Details       Final airway type: endotracheal airway       Successful airway: ETT - single  Endotracheal Airway Details        ETT size (mm): 2.0       Cuffed: yes       Cuff volume (mL): 6       Successful intubation technique: video laryngoscopy       VL Blade Size: Glidescope 3       Grade View of Cords: 1       Adjucts: stylet       Position: Right       Measured from: gums/teeth       Secured at (cm): 22    Post intubation assessment        Placement verified by: capnometry, equal breath sounds and chest rise        Number of attempts at approach: 1       Number of other approaches attempted: 0       Secured with: silk tape       Ease of procedure: easy       Dentition: Intact and Unchanged (preop teeth ridges in teeth nas front upper checked Dr N/DS)

## 2021-12-21 NOTE — PROGRESS NOTES
If able, would like patient to have PCA pump. Can discontinue PCA if pain level less than or equal to 5. Maintain rodriguez while PCA in place. If unable, start oral/breakthrough IV regimen and remove rodriguez catheter.     Kassandra Tobin CNP  [facility]surgery  O: [phone]  P: [phone]

## 2021-12-22 ENCOUNTER — TELEPHONE (OUTPATIENT)
Dept: FAMILY MEDICINE | Facility: CLINIC | Age: 66
End: 2021-12-22
Payer: MEDICARE

## 2021-12-22 ENCOUNTER — APPOINTMENT (OUTPATIENT)
Dept: PHYSICAL THERAPY | Facility: CLINIC | Age: 66
DRG: 519 | End: 2021-12-22
Attending: NURSE PRACTITIONER
Payer: MEDICARE

## 2021-12-22 ENCOUNTER — APPOINTMENT (OUTPATIENT)
Dept: OCCUPATIONAL THERAPY | Facility: CLINIC | Age: 66
DRG: 519 | End: 2021-12-22
Attending: SURGERY
Payer: MEDICARE

## 2021-12-22 ENCOUNTER — APPOINTMENT (OUTPATIENT)
Dept: RADIOLOGY | Facility: CLINIC | Age: 66
DRG: 519 | End: 2021-12-22
Attending: NURSE PRACTITIONER
Payer: MEDICARE

## 2021-12-22 ENCOUNTER — APPOINTMENT (OUTPATIENT)
Dept: PHYSICAL THERAPY | Facility: CLINIC | Age: 66
DRG: 519 | End: 2021-12-22
Attending: SURGERY
Payer: MEDICARE

## 2021-12-22 LAB
CANNABINOIDS UR CFM-MCNC: 50 NG/ML
CARBOXYTHC/CREAT UR: 86 NG/MG CREAT
ERYTHROCYTE [DISTWIDTH] IN BLOOD BY AUTOMATED COUNT: 12.1 % (ref 10–15)
HCT VFR BLD AUTO: 42 % (ref 35–47)
HGB BLD-MCNC: 14.4 G/DL (ref 11.7–15.7)
MCH RBC QN AUTO: 33.2 PG (ref 26.5–33)
MCHC RBC AUTO-ENTMCNC: 34.3 G/DL (ref 31.5–36.5)
MCV RBC AUTO: 97 FL (ref 78–100)
PLATELET # BLD AUTO: 200 10E3/UL (ref 150–450)
RBC # BLD AUTO: 4.34 10E6/UL (ref 3.8–5.2)
WBC # BLD AUTO: 7.9 10E3/UL (ref 4–11)

## 2021-12-22 PROCEDURE — 250N000013 HC RX MED GY IP 250 OP 250 PS 637: Performed by: NURSE PRACTITIONER

## 2021-12-22 PROCEDURE — 36415 COLL VENOUS BLD VENIPUNCTURE: CPT | Performed by: NURSE PRACTITIONER

## 2021-12-22 PROCEDURE — 97166 OT EVAL MOD COMPLEX 45 MIN: CPT | Mod: GO

## 2021-12-22 PROCEDURE — 250N000013 HC RX MED GY IP 250 OP 250 PS 637: Performed by: INTERNAL MEDICINE

## 2021-12-22 PROCEDURE — 250N000013 HC RX MED GY IP 250 OP 250 PS 637: Performed by: SURGERY

## 2021-12-22 PROCEDURE — 120N000001 HC R&B MED SURG/OB

## 2021-12-22 PROCEDURE — 99232 SBSQ HOSP IP/OBS MODERATE 35: CPT | Performed by: HOSPITALIST

## 2021-12-22 PROCEDURE — 97162 PT EVAL MOD COMPLEX 30 MIN: CPT | Mod: GP

## 2021-12-22 PROCEDURE — 250N000011 HC RX IP 250 OP 636: Performed by: NURSE PRACTITIONER

## 2021-12-22 PROCEDURE — 97116 GAIT TRAINING THERAPY: CPT | Mod: GP

## 2021-12-22 PROCEDURE — 250N000013 HC RX MED GY IP 250 OP 250 PS 637: Performed by: HOSPITALIST

## 2021-12-22 PROCEDURE — 99223 1ST HOSP IP/OBS HIGH 75: CPT | Performed by: NURSE PRACTITIONER

## 2021-12-22 PROCEDURE — 97535 SELF CARE MNGMENT TRAINING: CPT | Mod: GO

## 2021-12-22 PROCEDURE — 99207 PR CONSULT E&M CHANGED TO INITIAL LEVEL: CPT | Performed by: NURSE PRACTITIONER

## 2021-12-22 PROCEDURE — 85014 HEMATOCRIT: CPT | Performed by: NURSE PRACTITIONER

## 2021-12-22 PROCEDURE — 99207 PR CDG-MDM COMPONENT: MEETS MODERATE - DOWN CODED: CPT | Performed by: HOSPITALIST

## 2021-12-22 PROCEDURE — 999N000065 XR CERVICAL SPINE 2/3 VWS

## 2021-12-22 PROCEDURE — 97530 THERAPEUTIC ACTIVITIES: CPT | Mod: GP

## 2021-12-22 RX ORDER — METHOCARBAMOL 500 MG/1
500 TABLET, FILM COATED ORAL 3 TIMES DAILY PRN
Status: DISCONTINUED | OUTPATIENT
Start: 2021-12-22 | End: 2021-12-23

## 2021-12-22 RX ORDER — HYDROMORPHONE HYDROCHLORIDE 1 MG/ML
0.5 INJECTION, SOLUTION INTRAMUSCULAR; INTRAVENOUS; SUBCUTANEOUS EVERY 4 HOURS PRN
Status: DISCONTINUED | OUTPATIENT
Start: 2021-12-22 | End: 2021-12-24

## 2021-12-22 RX ORDER — HYDROXYZINE HYDROCHLORIDE 25 MG/1
25 TABLET, FILM COATED ORAL EVERY 6 HOURS PRN
Status: DISCONTINUED | OUTPATIENT
Start: 2021-12-22 | End: 2021-12-25 | Stop reason: HOSPADM

## 2021-12-22 RX ORDER — LIDOCAINE 4 G/G
2 PATCH TOPICAL
Status: DISCONTINUED | OUTPATIENT
Start: 2021-12-22 | End: 2021-12-25 | Stop reason: HOSPADM

## 2021-12-22 RX ORDER — OXYCODONE HYDROCHLORIDE 5 MG/1
5-10 TABLET ORAL
Status: DISCONTINUED | OUTPATIENT
Start: 2021-12-22 | End: 2021-12-24

## 2021-12-22 RX ADMIN — METHOCARBAMOL TABLETS 1000 MG: 500 TABLET, COATED ORAL at 21:39

## 2021-12-22 RX ADMIN — OXCARBAZEPINE 75 MG: 150 TABLET, FILM COATED ORAL at 10:40

## 2021-12-22 RX ADMIN — LEVOTHYROXINE SODIUM 150 MCG: 75 TABLET ORAL at 10:47

## 2021-12-22 RX ADMIN — METHOCARBAMOL TABLETS 1000 MG: 500 TABLET, COATED ORAL at 04:40

## 2021-12-22 RX ADMIN — SENNOSIDES AND DOCUSATE SODIUM 1 TABLET: 50; 8.6 TABLET ORAL at 20:43

## 2021-12-22 RX ADMIN — ACETAMINOPHEN 975 MG: 325 TABLET ORAL at 02:44

## 2021-12-22 RX ADMIN — HYDROMORPHONE HYDROCHLORIDE 1 MG: 1 INJECTION, SOLUTION INTRAMUSCULAR; INTRAVENOUS; SUBCUTANEOUS at 05:27

## 2021-12-22 RX ADMIN — PANTOPRAZOLE SODIUM 40 MG: 20 TABLET, DELAYED RELEASE ORAL at 06:36

## 2021-12-22 RX ADMIN — OXYCODONE HYDROCHLORIDE 10 MG: 5 TABLET ORAL at 21:39

## 2021-12-22 RX ADMIN — B-COMPLEX W/ C & FOLIC ACID TAB 1 MG 1 TABLET: 1 TAB at 10:43

## 2021-12-22 RX ADMIN — Medication 1000 MCG: at 10:44

## 2021-12-22 RX ADMIN — POLYETHYLENE GLYCOL 3350 17 G: 17 POWDER, FOR SOLUTION ORAL at 10:40

## 2021-12-22 RX ADMIN — ACETAMINOPHEN 975 MG: 325 TABLET ORAL at 19:45

## 2021-12-22 RX ADMIN — SENNOSIDES AND DOCUSATE SODIUM 2 TABLET: 50; 8.6 TABLET ORAL at 10:41

## 2021-12-22 RX ADMIN — ROPINIROLE 2 MG: 1 TABLET, FILM COATED ORAL at 20:43

## 2021-12-22 RX ADMIN — METHOCARBAMOL TABLETS 500 MG: 500 TABLET, COATED ORAL at 11:50

## 2021-12-22 RX ADMIN — POTASSIUM CHLORIDE 20 MEQ: 1500 TABLET, EXTENDED RELEASE ORAL at 10:44

## 2021-12-22 RX ADMIN — HYDROMORPHONE HYDROCHLORIDE 0.5 MG: 1 INJECTION, SOLUTION INTRAMUSCULAR; INTRAVENOUS; SUBCUTANEOUS at 13:38

## 2021-12-22 RX ADMIN — OXYCODONE HYDROCHLORIDE 10 MG: 5 TABLET ORAL at 10:58

## 2021-12-22 RX ADMIN — OXYCODONE HYDROCHLORIDE 10 MG: 5 TABLET ORAL at 16:56

## 2021-12-22 RX ADMIN — LOSARTAN POTASSIUM 25 MG: 25 TABLET, FILM COATED ORAL at 10:42

## 2021-12-22 RX ADMIN — ACETAMINOPHEN 975 MG: 325 TABLET ORAL at 10:42

## 2021-12-22 RX ADMIN — HYDROXYZINE HYDROCHLORIDE 25 MG: 25 TABLET ORAL at 13:38

## 2021-12-22 RX ADMIN — LIDOCAINE 2 PATCH: 246 PATCH TOPICAL at 18:48

## 2021-12-22 RX ADMIN — Medication 800 UNITS: at 10:43

## 2021-12-22 RX ADMIN — HYDROMORPHONE HYDROCHLORIDE 0.5 MG: 1 INJECTION, SOLUTION INTRAMUSCULAR; INTRAVENOUS; SUBCUTANEOUS at 19:45

## 2021-12-22 RX ADMIN — Medication 250 MCG: at 10:58

## 2021-12-22 RX ADMIN — OXYCODONE HYDROCHLORIDE 10 MG: 5 TABLET ORAL at 04:26

## 2021-12-22 ASSESSMENT — ACTIVITIES OF DAILY LIVING (ADL)
ADLS_ACUITY_SCORE: 13
ADLS_ACUITY_SCORE: 15
ADLS_ACUITY_SCORE: 16
ADLS_ACUITY_SCORE: 15
ADLS_ACUITY_SCORE: 13
ADLS_ACUITY_SCORE: 15
ADLS_ACUITY_SCORE: 14
ADLS_ACUITY_SCORE: 14
ADLS_ACUITY_SCORE: 15
ADLS_ACUITY_SCORE: 13
ADLS_ACUITY_SCORE: 15
ADLS_ACUITY_SCORE: 13
ADLS_ACUITY_SCORE: 15

## 2021-12-22 NOTE — OP NOTE
NEUROSURGERY OPERATIVE REPORT     DATE OF SERVICE: 12/21/2021      PREOPERATIVE DIAGNOSES:  Spinal stenosis in cervical region   Cervical myelopathy   Cervical radiculopathy     POSTOPERATIVE DIAGNOSES:  same    PROCEDURE:   1. Left-open door laminoplasty with Synthes maxillofacial titanium plates secured to the lamina with screws and secured to the facet with screws at cervical 3, cervical 4, cervical 5 and cervical 6.  2. Foraminotomies at left cervical 4-cervical 5 and cervical 6-cervical 7.     SURGEON: Angela Gregory MD    ASSISTANT: Kam Tobin CNP    INDICATIONS:  Randi Cleary is a 66 year old female who presents with imbalance, numbness and tingling in her hands, fine motor difficulties, left shoulder pain, radiating left arm pain and numbness. She complains of both myelopathy and radiculopathy symptoms. MRI of her cervical spine sows multi-level cervical stenosis including moderate at C4-5, and moderate to severe at C6-7 and mild spinal canal stenosis at C5-6 and C3-4 with multi-level severe foraminal narrowing at these levels. Also has retrolisthesis of C4-5 and anterolisthesis of C7-T1. XR does not show any significant instability. Discussed with patient and her  in detail the risks and benefits of surgery for decompression of her cervical spine.  Recommend cervical 3-6 left open door laminoplasty with left C4-5 C6-7 foraminotomies. They agreed to proceed.    PROCEDURE:  After obtaining informed consent, the patient was brought to the operating room with TEDs and pneumatic stockings in place.  IV antibiotics was administered.  She was intubated under general endotracheal anesthesia with her neck in a completely neutral position, no hyperextension.  An arterial line was placed to keep her mean arterial pressure normal throughout the case. She was positioned in a Malagon headholder, turned into the prone position on the radiolucent laminectomy frame, and secured to the operating room  table with his neck in a neutral position.  A foot board was positioned at the end of the table which was then turned into reverse trendelenburg.   She  was shaved, prepped and draped in the usual sterile fashion.        A preincisional infiltration of local anesthetic was performed along the midline and the incision was opened sharply and extended down to the fascia.  The fascia was opened in one layer with monopolar electrocautery.  A subperiosteal dissection was undertaken to expose the lamina at inferior cervical 2 to superior cervical 7.   We verified levels with a lateral x-ray.    We then exposed the dura at the cervical 2-3 and cervical 6-7 levels with a high speed air drill and kerrison rongeur. We next  drilled through and through the lamina on the patient's left down to the level of the ligamentum, which was elevated on a small blunt hook and cut to expose the underlying intact thecal sac was well exposed. Hemostasis was achieved.   We then performed a symmetric trough on the patient's right, taking care to leave the corticalmantle of bone in place for the erum portion of the laminoplasty. We copiously irrigated away all bone dust.  We then elevated the lamina from the patient's open side with an upbiting currette, accomplishing a greenstick fracture on the side of the hinge.   Once the canal was opened, the thecal sac immediately decompressed into the newly created space with good pulsation. We trimmed away any remaining ligamentum  And completed foramenotomies. We drilled foraminotomies at  left cervical 4-5 and cervical 6-7.   We were careful to remove sufficient ligamentum and lamina to decompress the underlying nerveroot but not so much as to compromise the hinge portion of the laminoplasty.   We next placed Synthes maxillofacial titanium plates bent to the shape of the canal, secured to the facet with screws and secured to the lamina with  screws at cervical 3-6,  a parallel plate was placed at each  level until we had 4 parallel plates in place with a nice solid construct. Of note the cervical 3 lamina was very small and a smaller plate then normal was used.  Each screw was drilled individually and achieved good purchase. Any screw which did not achieve good purchase was immediately replaced by a rescue screw.     Due to the nature and complexity of the case an assistant was required for the duration of the case for retraction, hemostasis, and closure.    Once the construct was in good position, we copiously irrigated the incision with antibiotic-containing saline and achieved hemostasis. A drain was placed. We closed the incision with interrupted 0 Vicryl to approximate the fascia and muscle layer, inverted 2-0 Vicryl to approximate the subcutaneous tissues and staples to approximate the skin edges. Sponge and needle counts were correct prior to closure x2. Sterile dressings were placed.     Patient tolerated the procedure well, was taken to the recovery room at neurological baseline with no new deficits appreciated.     Estimated blood loss: 75 ml    Specimens: none    Findings:Foraminal stenosis C4-5 and C6-7 on left. Neuro monitoring stable throughout procedure     Implants:   Implant Name Type Inv. Item Serial No.  Lot No. LRB No. Used Action   IMP SCR SYN CORTEX 1.5X04MM W/PLUSDRIVE .054E - [id number] [employer] IMP SCR SYN CORTEX 1.5X04MM W/PLUSDRIVE .[id number]  [facility]  Left 7 Implanted   IMP SCR SYN CORTEX 1.5X05MM W/PLUSDRIVE .055 - [id number] [employer] IMP SCR SYN CORTEX 1.5X05MM W/PLUSDRIVE .055  [facility]  Left 5 Implanted   IMP PLATE SYN ADAPTION 90MM 20H .10 - [id number] [employer] IMP PLATE SYN ADAPTION 90MM 20H .10  [facility]  Left 2 Implanted         Angela Gregory MD    Cc: Cari Torres

## 2021-12-22 NOTE — ANESTHESIA POSTPROCEDURE EVALUATION
Patient: Randi Cleary    Procedure: Procedure(s):  CERVICAL 3-CERVICAL 6 LEFT OPEN DOOR LAMINOPLASTY AND LEFT CERVICAL 4-5 AND CERVICAL 6-7 POSTERIOR FORAMINOTOMY       Diagnosis:Spinal stenosis in cervical region [M48.02]  Cervical radiculopathy [M54.12]  Diagnosis Additional Information: No value filed.    Anesthesia Type:  General    Note:     Postop Pain Control: Uneventful            Sign Out: Well controlled pain   PONV: No   Neuro/Psych: Uneventful            Sign Out: Acceptable/Baseline neuro status   Airway/Respiratory: Uneventful            Sign Out: Acceptable/Baseline resp. status   CV/Hemodynamics: Uneventful            Sign Out: Acceptable CV status; No obvious hypovolemia; No obvious fluid overload   Other NRE: NONE   DID A NON-ROUTINE EVENT OCCUR? No           Last vitals:  Vitals Value Taken Time   /69 12/21/21 1810   Temp 36.5  C (97.7  F) 12/21/21 1649   Pulse 75 12/21/21 1818   Resp 14 12/21/21 1818   SpO2 97 % 12/21/21 1818   Vitals shown include unvalidated device data.    Electronically Signed By: Mckinley Person MD  December 22, 2021  6:38 AM

## 2021-12-22 NOTE — CONSULTS
[facility] ACUTE PAIN SERVICE CONSULTATION     Date of Admission:  12/21/2021  Date of Consult (When I saw the patient): 12/22/21  Physician requesting consult: VERONICA Jennings   Reason for consult: chronic pain, s/p cervical laminoplasty  Primary Care Physician: Cari Torres     Assessment/Plan:     Randi Cleary is a 66 year old female who was admitted on 12/21/2021.  Pain team was asked to see the patient for chronic pain, post op pain. Admitted for cervical laminoplasty. History of moderate pulmonary hypertension, coronary angiogram earlier this year showing normal vessels, hemochromatosis, obstructive sleep apnea, COPD with 50-pack-year smoking history quit 2000 who is admitted electively for decompression cervical spine with evidence of chronic cord compression neurosurgical team. Describes pain as 8/10 and aching. The patient denies N/V/C/D. Diet is normal. The patient does no longer smoke and denies chemical dependency history.   Patient uses medical cannabis as outpatient.    Post op day: 1 Day Post-Op.   CERVICAL 3-CERVICAL 6 LEFT OPEN DOOR LAMINOPLASTY AND LEFT CERVICAL 4-5 AND CERVICAL 6-7 POSTERIOR FORAMINOTOMY  Since surgery, patient has utilized 20mg of oxycodone and 1mg of IV Dilaudid bumpms    Opioid Induced Respiratory Depression Risk Assessment:?high (post op, age, multiple opioids, jose maria)?     PLAN:   1) Pain is consistent with acute on chronic pain. Consider the following differentials post op.  Labs and imaging indicated: cervical imaging pre op, indicative of planned surgery. Treatment plan includes opioids short term. Patient educated regarding plan. Patient is understanding of the plan. All questions and concerns addressed to patient's satisfaction.   2)Multimodal Medication Therapy  Topical:  none  NSAID'S: CrCl 99ml/min.  none  Muscle Relaxants: Robaxin 1000 mg at bedime prn (home med) and tid prn  Adjuvants: APAP 975mg q8h  Antidepressants/anxiolytics: Valium 5mg q4hprn -  discontinue, Atarax 10mg q6hprn  Opioids: oxycoodne 5-10mg q3hprn  IV Pain medication: Dilaudid PCA - discontinue, IV Dilaudid 0.5-1mg q2hprn - change to Dilaudid 0.5 mg q4h prn   3)Non-medication interventions: Ice, PT/OT  Acupuncture consult, Integrative consult - if patient agreeable   4)Constipation Prophylaxis: Scheduled and prn: Senna S scheduled 1-2tab daily  5) Care Teams: neurosurgery    -Opioid prescriber has been Dr Dusty Dubois  -[place]  pulled from system on 10/22. This indicates:   12/20/2021 Norco 5-325 10 tabs fr 10 days. Same RX on 12/10/21, 12/02/21, 11/20/21, 11/12/21  Reports also uses medical cannabis, robaxin  Discharge Recommendations - We recommend prescribing the following at the time of discharge: per surgery      History of Present Illness (HPI):       Randi Cleary is a 66 year old old female who presented for planned surgery.  Past medical history as above. The pain is reported to be acute on chronic, located in the neck. Current pain is rated at 8/10 and goal is 2-4/10.      Per MN  review, the patient does have an opioid tolerance. Opioid induced side effects noted and include: sedation, nausea, and constipation, sleep apnea, uses medical cannabis.      Reviewed medical record, labs, imaging, ED note, and care everywhere.     Past pain treatments have included: meds, below      Home pain medications/psych medications/anticoagulation medications include: Norco 1 qhsprn  Robaxin, medical cannabis     Last UDS: preop     Medical History   PAST MEDICAL HISTORY:   Past Medical History:   Diagnosis Date     Anxiety      Anxiety      Bipolar disorder (H)      Breast cancer (H) 1986    lumpectomy, radiation, chemo     Breast cancer (H) 1994     Chronic pain syndrome      COPD (chronic obstructive pulmonary disease) (H)     asthma     COPD (chronic obstructive pulmonary disease) (H)      Depression      Depression, major, recurrent (H)      Depressive disorder     30+ years     Dizzy       Drug tolerance     opioid     Esophageal reflux      Esophageal reflux      Fatigue      Graves disease 1994     Graves disease      Hemochromatosis 02/14/2018    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 Created by Conversion  Replacement Utility updated for latest IMO load Formatting of this note might be different from the original. Created by Conversion  Replacement Utility updated for latest IMO load     History of corticosteroid therapy 11/19/2019     History of partial adrenalectomy (H) 11/19/2019     History of pheochromocytoma 11/19/2019     Hx antineoplastic chemotherapy      Hx of radiation therapy      Hyperlipidaemia      Hypertension      Hypertension      Impaired fasting glucose 2017     Impaired fasting glucose      Injury of neck, whiplash 7/15/2021     Joint pain      Morbid obesity (H)      KYAW (obstructive sleep apnea) 2016     Osteopenia      Pheochromocytoma      Pheochromocytoma, left 08/02/2017    laparoscopically removed     Postablative hypothyroidism 1995     Prediabetes 10/03/2019    by A1c     Psoriasis      Psoriasis      Psoriatic arthropathy (H)      Psoriatic arthropathy (H)      Restless leg syndrome      Right rotator cuff tear      RLS (restless legs syndrome)     on ropinorole     Sacroiliitis (H)      Serotonin syndrome 08/28/2020    [facility]     Serotonin syndrome 8/28/2020     Sleep apnea 2017    CPAP     Snoring      Spinal stenosis      Spinal stenosis      Status post coronary angiogram 10/3/2019     Uncomplicated asthma      Urinary incontinence      Vitamin B 12 deficiency 2009     Vitamin B12 deficiency      Vitamin D deficiency 2010       PAST SURGICAL HISTORY:   Past Surgical History:   Procedure Laterality Date     ARTHRODESIS ANKLE       ARTHROPLASTY ANKLE Right 6/29/2015    Procedure: ARTHROPLASTY ANKLE;  Surgeon: Jason Coughlin  MD;  Location: [facility]     ARTHROPLASTY REVISION ANKLE Right 6/29/2015    Procedure: ARTHROPLASTY REVISION ANKLE;  Surgeon: Jason Coughlin MD;  Location: [facility]     BIOPSY BREAST       BREAST BIOPSY, CORE RT/LT       C ARTHRODESIS,ANKLE,OPEN Right     Description: Ankle Arthrodesis;  Recorded: 04/07/2010;     COLONOSCOPY       COLONOSCOPY N/A 2/25/2021    Procedure: COLONOSCOPY;  Surgeon: Guru Elke Tolbert MD;  Location: [facility]     CV CORONARY ANGIOGRAM N/A 10/3/2019    Procedure: CV CORONARY ANGIOGRAM;  Surgeon: Bryce Pierre MD;  Location:  HEART CARDIAC CATH LAB     CV RIGHT HEART CATH MEASUREMENTS RECORDED N/A 10/3/2019    Procedure: CV RIGHT HEART CATH;  Surgeon: Bryce Pierre MD;  Location:  HEART CARDIAC CATH LAB     ESOPHAGOSCOPY, GASTROSCOPY, DUODENOSCOPY (EGD), COMBINED N/A 2/25/2021    Procedure: ESOPHAGOGASTRODUODENOSCOPY, WITH BIOPSY;  Surgeon: Guru Elke Tolbert MD;  Location:  GI     EYE SURGERY  2021     HC REMOVE TONSILS/ADENOIDS,<11 Y/O      Description: Tonsillectomy With Adenoidectomy;  Recorded: 04/07/2010;     IR LUMBAR EPIDURAL STEROID INJECTION  10/26/2004     IR LUMBAR EPIDURAL STEROID INJECTION  11/16/2004     IR LUMBAR EPIDURAL STEROID INJECTION  12/21/2004     IR LUMBAR EPIDURAL STEROID INJECTION  6/8/2006     JOINT REPLACEMENT       LAPAROSCOPIC ADRENALECTOMY Left 08/02/2017    pheochromocytoma     LAPAROSCOPIC ADRENALECTOMY Left 8/2/2017    Procedure: LAPAROSCOPIC LEFT ADRENALECTOMY, ;  Surgeon: Gab Linares MD;  Location: [facility] OR;  Service:      LENGTHEN TENDON ACHILLES Right 6/29/2015    Procedure: LENGTHEN TENDON ACHILLES;  Surgeon: Jason Coughlin MD;  Location: [facility]     LUMPECTOMY BREAST       LUMPECTOMY BREAST Left 1994     MAMMOPLASTY REDUCTION Right 01/13/2015    [place]     MAMMOPLASTY REDUCTION Right     approx late 2015/early2016     MASTECTOMY      left lumpectomy with axillary node dissection      MASTECTOMY MODIFIED RADICAL       OTHER SURGICAL HISTORY Right     reconstructive breast surgery     OTHER SURGICAL HISTORY      Adrenalectomy for pheochromocytoma     OR MASTECTOMY, MODIFIED RADICAL      Description: Modified Radical Mastectomy Left Breast;  Recorded: 2010;     REPAIR HAMMER TOE Right 2015    Procedure: REPAIR HAMMER TOE;  Surgeon: Jason Coughlin MD;  Location: [facility]     TONSILLECTOMY       TONSILLECTOMY & ADENOIDECTOMY         FAMILY HISTORY:   Family History   Problem Relation Age of Onset     Lupus Sister      Hypertension Sister      Obesity Sister      Scleroderma Sister      Heart Failure Sister      Hemochromatosis Sister      Hemochromatosis Brother      Hypertension Brother      Alcoholism Brother      Substance Abuse Brother      Hemochromatosis Father      Myocardial Infarction Father      Snoring Father      Heart Disease Father      Obesity Father      Coronary Artery Disease Father          at age 53 of heart attack     Hypertension Father      Genetic Disorder Father         Hemachromatosis     Obesity Mother      Thyroid Disease Mother      Arthritis Mother      Hypertension Mother      Osteoporosis Mother      Mental Illness Maternal Uncle      Alcoholism Maternal Grandfather      Obesity Sister      Cardiovascular Sister      Hypertension Sister      Arthritis Sister      Hemochromatosis Sister      Lupus Sister      Scleroderma Sister      Coronary Artery Disease Sister      Genetic Disorder Sister         Lupus, Hemachromatosis     Cardiovascular Brother      Hypertension Brother      Arthritis Brother      Hemochromatosis Brother      Prostate Cancer Brother      Genetic Disorder Brother         Hemachromatosis     Obesity Brother      Cardiovascular Maternal Grandmother      Coronary Artery Disease Maternal Grandmother      Osteoporosis Maternal Grandmother      Cerebrovascular Disease Paternal Grandmother      Adrenal Disorder No family hx of       "Breast Cancer No family hx of        SOCIAL HISTORY:   Social History     Tobacco Use     Smoking status: Former Smoker     Packs/day: 2.50     Years: 20.00     Pack years: 50.00     Types: Cigarettes     Start date: 1971     Quit date: 2000     Years since quittin.4     Smokeless tobacco: Never Used   Substance Use Topics     Alcohol use: Not Currently     Comment: relapse 2021 sober         HE[employer]IFESTYLE [employer]ACTICES  Tobacco:  reports that she quit smoking about 21 years ago. Her smoking use included cigarettes. She started smoking about 50 years ago. She has a 50.00 pack-year smoking history. She has never used smokeless tobacco.  Alcohol:  reports previous alcohol use.  Illicit drugs:  reports current drug use. Drug: Marijuana.    Allergies  Allergies   Allergen Reactions     Serotonin Reuptake Inhibitors Anxiety, Difficulty breathing, Headache, Palpitations and Shortness Of Breath     Buspirone      The patient states she had serotonin syndrome     Cephalexin      Other reaction(s): unknown rxn.     Desvenlafaxine      Serotonin syndrome     Diclofenac Sodium [Diclofenac]      Serotonin syndrome and restless legs syndrome     Gabapentin      Drove on the wrong side of the highway     Levofloxacin      \"CAN'T REMEMBER\"     Penicillins      \"SORES IN MOUTH\"     Riluzole Difficulty breathing and Swelling     Sulfa Drugs      \"PT DOES NOT KNOW WHAT THE REACTION WAS\"       Problem List  Patient Active Problem List    Diagnosis Date Noted     Cord compression (H) 2021     Priority: Medium     History of cervical spinal surgery 2021     Priority: Medium     Angela Gregory MD   2021  4:11 PM   Spinal stenosis in cervical region [M48.02]  Cervical radiculopathy [M54.12]  CERVICAL 3-CERVICAL 6 LEFT OPEN DOOR LAMINOPLASTY AND LEFT CERVICAL 4-5 AND CERVICAL 6-7 POSTERIOR FORAMINOTOMY         Cervical radiculopathy 2021     Priority: Medium     Morbid obesity (H) 2021 "     Priority: Medium     Bipolar 2 disorder (H) 11/01/2021     Priority: Medium     Anxiety disorder, unspecified 07/15/2021     Priority: Medium     Restless leg syndrome 07/15/2021     Priority: Medium     Vitamin B12 deficiency 07/15/2021     Priority: Medium     Formatting of this note might be different from the original.  Created by Conversion       Vitamin D deficiency 07/15/2021     Priority: Medium     Formatting of this note might be different from the original.  Created by Conversion    Replacement Utility updated for latest IMO load       Hypokalemia 09/18/2020     Priority: Medium     Serotonin syndrome 08/10/2020     Priority: Medium     Iron deficiency 12/11/2019     Priority: Medium     KYAW (obstructive sleep apnea) 12/11/2019     Priority: Medium     Postablative hypothyroidism 11/19/2019     Priority: Medium     Formatting of this note might be different from the original.  Created by Conversion    Replacement Utility updated for latest IMO load  Formatting of this note might be different from the original.  Created by Conversion    Replacement Utility updated for latest IMO load       Class 2 obesity due to excess calories without serious comorbidity in adult 11/19/2019     Priority: Medium     Prediabetes 11/19/2019     Priority: Medium     Pulmonary hypertension (H) 09/24/2019     Priority: Medium     Cardiac Catheterization    Reading physicians: Roland Pathak MD; Bryce Pierre MD Ordering physician: Francisco J Edwards MD Study date: 10/3/19   1. Normal coronary arteries.    Right sided filling pressures are moderately elevated.    Moderately elevated pulmonary artery hypertension.    Left sided filling pressures are mildly elevated.    Normal cardiac output level.            Hemochromatosis, unspecified hemochromatosis type 09/10/2019     Priority: Medium     COPD (chronic obstructive pulmonary disease) (H) 12/02/2016     Priority: Medium     Created by Conversion         DJD  (degenerative joint disease), ankle and foot 06/29/2015     Priority: Medium     Pain in joint, multiple sites 10/21/2009     Priority: Medium       Prior to Admission Medications   Medications Prior to Admission   Medication Sig Dispense Refill Last Dose     albuterol (PROVENTIL) (2.5 MG/3ML) 0.083% neb solution Take 1 vial (2.5 mg) by nebulization every 4 hours as needed for shortness of breath / dyspnea or wheezing 360 mL 5 Past Month at Unknown time     albuterol (VENTOLIN HFA) 108 (90 Base) MCG/ACT inhaler Inhale 2 puffs into the lungs every 6 hours 18 g 11 Past Week at has with     Cholecalciferol (VITAMIN D3) 250 MCG (32116 UT) TABS Take 1 tablet by mouth daily 76832/day   12/20/2021 at Unknown time     Cyanocobalamin (VITAMIN B-12) 5000 MCG SUBL Place 2-3 sprays under the tongue daily Unknown dose. 2 or 3 sprays/day   12/20/2021 at Unknown time     ethacrynic acid (EDECRIN) 25 MG tablet Take 1 tablet by mouth on Saturday and Wednesday 30 tablet 11 12/19/2021 at has with     Fluticasone-Umeclidin-Vilanterol (TRELEGY ELLIPTA) 200-62.5-25 MCG/INH oral inhaler Inhale 1 puff into the lungs daily 1 each 11 12/21/2021 at has with     HYDROcodone-acetaminophen (NORCO) 5-325 MG tablet Take 1 tablet by mouth At Bedtime for 10 days For chronic pain 10 tablet 0 12/20/2021 at Unknown time     losartan (COZAAR) 25 MG tablet Take 1 tablet (25 mg) by mouth daily 90 tablet 2 12/20/2021 at Unknown time     magnesium 250 MG tablet Take 1 tablet by mouth 2 times daily   12/20/2021 at Unknown time     methocarbamol (ROBAXIN) 500 MG tablet Take 1 to 2 tablets at bedtime 60 tablet 0 12/20/2021 at Unknown time     Multiple Vitamins-Iron (MULTIVITAMIN PLUS IRON ADULT) TABS Take 4 tablets by mouth daily 120 tablet 0 12/14/2021     omeprazole (PRILOSEC) 20 MG DR capsule Take 20 mg by mouth daily    12/21/2021 at Unknown time     OXcarbazepine (TRILEPTAL) 150 MG tablet One-half tab bedtime 3 nights, one-half tab twice a day 5 days, one  twice a day (Patient taking differently: Take 75 mg by mouth daily One-half tab twice a day) 30 tablet 3 12/20/2021 at Unknown time     potassium chloride ER (KLOR-CON M) 20 MEQ CR tablet Take 1 tablet (20 mEq) by mouth daily 90 tablet 3 12/20/2021 at Unknown time     PREBIOTIC PRODUCT PO Take by mouth daily    12/20/2021 at Unknown time     rOPINIRole (REQUIP) 2 MG tablet Take 2-4 mg by mouth At Bedtime    12/20/2021 at Unknown time     SYNTHROID 150 MCG tablet Take 1 tablet (150 mcg) by mouth daily 90 tablet 4 12/21/2021 at has with     Turmeric Curcumin 500 MG CAPS Take 500 mg by mouth daily    12/12/2021     vitamin (B COMPLEX-C) tablet Take 1 tablet by mouth daily   12/12/2021     vitamin E 400 units TABS Take 800 Units by mouth daily   12/12/2021     medical cannabis (Patient's own supply) See Admin Instructions (The purpose of this order is to document that the patient reports taking medical cannabis.  This is not a prescription, and is not used to certify that the patient has a qualifying medical condition.)          Review of Systems  Complete ROS reviewed, unless noted in HPI, all other systems reviewed (with patient) and all others found to be negative.      Objective:     Physical Exam:  BP (!) 155/87 (BP Location: Right arm)   Pulse 92   Temp 97.1  F (36.2  C) (Oral)   Resp 16   Wt 98 kg (216 lb 1.6 oz)   SpO2 95%   BMI 34.88 kg/m    Weight:   Vitals:    12/21/21 1159   Weight: 98 kg (216 lb 1.6 oz)      Body mass index is 34.88 kg/m .    General Appearance:  Alert, cooperative, no distress   Head:  Normocephalic, without obvious abnormality, atraumatic   Eyes:  PERRL, conjunctiva/corneas clear, EOM's intact   ENT/Throat: Lips, mucosa, and tongue normal; teeth and gums normal   Lymph/Neck: incision is covered, Gilmer J on    Lungs:   Clear to auscultation bilaterally, respirations unlabored   Chest Wall:  No tenderness or deformity   Cardiovascular/Heart:  Regular rate and rhythm, S1, S2 normal     Abdomen:   Soft, non-tender, bowel sounds active all four quadrants,  no masses, no organomegaly   Musculoskeletal: Extremities normal, atraumatic   Skin: Skin warm, dry   Neurologic: Alert and oriented X 3, Moves all 4 extremities     Psych: Affect is appropriate      Imaging: Reviewed  Lateral Cervical Spine for Surgical Imaging  [XR CERVICAL SPINE PORT 1 VIEW    Result Date: 12/21/2021  EXAM: XR CROSSTABLE LATERAL CERVICAL SPINE PORTABLE LOCATION: [facility] DATE/TIME: 12/21/2021 2:07 PM INDICATION: Intra-operative COMPARISON: None. TECHNIQUE: CR Cervical Spine.     IMPRESSION: Intraoperative view of the cervical spine demonstrates surgical instruments posterior to the posterior elements at the C3-C4 level.       Labs: Reviewed   Recent Results (from the past 24 hour(s))   CBC with platelets    Collection Time: 12/22/21  5:49 AM   Result Value Ref Range    WBC Count 7.9 4.0 - 11.0 10e3/uL    RBC Count 4.34 3.80 - 5.20 10e6/uL    Hemoglobin 14.4 11.7 - 15.7 g/dL    Hematocrit 42.0 35.0 - 47.0 %    MCV 97 78 - 100 fL    MCH 33.2 (H) 26.5 - 33.0 pg    MCHC 34.3 31.5 - 36.5 g/dL    RDW 12.1 10.0 - 15.0 %    Platelet Count 200 150 - 450 10e3/uL       Total time spent 80 minutes with greater than 50% in consultation, education and coordination of care, examination of patient, review of medical record, lab and imaging results, completion of documentation, and discussion with RN, MD, Senior Acupuncturist, and pharmacist.      Thank you for this consultation.    SURINDER Blanton-C  Acute Care Pain Management Program  [facility] ([place], [place], [place])  Monday-Friday 8a-4p   Page via online paging system or call [phone]

## 2021-12-22 NOTE — CONSULTS
Care Management Initial Consult    General Information  Assessment completed with: Patient, pt  Type of CM/SW Visit: Initial Assessment    Primary Care Provider verified and updated as needed: Yes   Readmission within the last 30 days:     Reason for Consult: discharge planning  Advance Care Planning: Advance Care Planning Reviewed: verified with patient        Communication Assessment  Patient's communication style: spoken language (English or Bilingual)    Hearing Difficulty or Deaf: no   Wear Glasses or Blind: no    Cognitive  Cognitive/Neuro/Behavioral: WDL  Level of Consciousness: alert,confused  Arousal Level: opens eyes spontaneously                Living Environment:   People in home: spouse  Sidney  Current living Arrangements: house      Able to return to prior arrangements: yes     Family/Social Support:  Care provided by: self  Provides care for: no one  Marital Status:     Sidney       Description of Support System: Supportive,Involved    Support Assessment: Adequate family and caregiver support    Current Resources:   Patient receiving home care services: No     Community Resources: None  Equipment currently used at home: grab bar, toilet,grab bar, tub/shower,raised toilet seat,shower chair  Supplies currently used at home: None    Employment/Financial:  Employment Status: disabled        Financial Concerns: No concerns identified   Referral to Financial Counselor: No     Lifestyle & Psychosocial Needs:  Social Determinants of Health     Tobacco Use: Medium Risk     Smoking Tobacco Use: Former Smoker     Smokeless Tobacco Use: Never Used   Alcohol Use: Not on file   Financial Resource Strain: Not on file   Food Insecurity: Not on file   Transportation Needs: Not on file   Physical Activity: Not on file   Stress: Not on file   Social Connections: Not on file   Intimate Partner Violence: Not on file   Depression: At risk     PHQ-2 Score: 4   Housing Stability: Low Risk      Unable to Pay for  Housing in the Last Year: No     Number of Places Lived in the Last Year: 1     Unstable Housing in the Last Year: No     Functional Status:  Prior to admission patient needed assistance:     Mental Health Status:       Chemical Dependency Status:  Chemical Dependency Status: No Current Concerns           Values/Beliefs:  Spiritual, Cultural Beliefs, Islam Practices, Values that affect care: no             Additional Information:  Met with patient to review role of care management, progression of care and possible need for services at discharge, including OP services, home care, or skilled nursing care.   Pt reports she resides with her spouse in an older farm house that has various levels. She states she normally ambulates independently without any devices and has been independent with ADLs, including driving.     Pt states she has had many surgeries and has worked out same discharge plan in the past. She states she has already started process for obtaining a hospital bed and shower bench with her PCP an [facility] in [place]. She plans to have a hospital bed on her main floor so she does not need to walk up stairs to her bedroom. She states PT told her they will help get a walker and shower bench for her, so she only needs the hospital bed from [facility].    Discussed recommendation for home care services.  She is in agreement with home PT, OT and HHA and does not have a preference on agencies.  She states her  will be on a break from work for the next 2 wks so is able to provide assistance. She states she also has friends who can provide support and assistance. Referrals sent to several home care agencies.    Call placed to [facility] and they are unable to locate order for hospital bed and shower bench.  Call placed to PCP's office and left message to check on status of order.    3:47 PM  Met with pt again to provide update regarding hospital bed.  She states her PCP office had initially  faxed order to [facility], but they do not supply hospital beds. So she states she spoke with her PCP on Monday, 12/20/2021 and they were supposed to fax order to [facility] on that date.   Pt has a copy of the order with her.  Copy of order faxed to [facility] at [phone].    Call placed to [facility] and spoke with Casi, [phone]. She states once they receive order they will start to process it and will ann it as expedited.    4:23 PM  Received call back from pt's PCP office and spoke with Maryan. She confirmed they faxed orders to [facility] on 12/20/2021 along with other medical records. She will re-fax information now.    Sherry Inman RN

## 2021-12-22 NOTE — PLAN OF CARE
Patient has been drowsy since surgery. Has been rating her pain at a 8-10/10. PRN dilaudid and oxycodone given. Patient on pulse ox. Due to patient being opoid naive, pharmacy suggested PCA be put on hold and PRN dilaudid and oxycodone be used.  Neurosurgery agreed with this plan. PCA available on standby. Will notify MD if PCA needs to be resumed. Has not been out of bed yet. Babcock in place. KARLEY drain stripped and emptied.       Problem: Pain (Spinal Surgery)  Goal: Acceptable Pain Control  Outcome: Improving   Niki Jc, RN 12/22/2021

## 2021-12-22 NOTE — PROGRESS NOTES
12/22/21 0835   Quick Adds   Type of Visit Initial PT Evaluation   Living Environment   People in home spouse   Current Living Arrangements house   Home Accessibility stairs to enter home;stairs within home   Number of Stairs, Main Entrance 3   Stair Railings, Main Entrance railing on right side (ascending)   Number of Stairs, Within Home, Primary none  (can live on one)   Stair Railings, Within Home, Primary none   Self-Care   Usual Activity Tolerance good   Current Activity Tolerance fair   Activity/Exercise/Self-Care Comment very fearful of pain and moving   General Information   Onset of Illness/Injury or Date of Surgery 12/21/21   Referring Physician Angela Gregory MD   Patient/Family Therapy Goals Statement (PT) less itching and pain   Pertinent History of Current Problem (include personal factors and/or comorbidities that impact the POC) s/p C3-6 open door laminoplasty and foraminotomy   Existing Precautions/Restrictions lifting;brace worn when out of bed   Weight-Bearing Status - LLE full weight-bearing   Weight-Bearing Status - RLE full weight-bearing   Bed Mobility   Bed Mobility supine-sit   Supine-Sit [place] (Bed Mobility) verbal cues;minimum assist (75% patient effort)   Transfers   Transfers sit-stand transfer   Sit-Stand Transfer   Sit-Stand [place] (Transfers) contact guard;verbal cues   Assistive Device (Sit-Stand Transfers) walker, front-wheeled   Sit/Stand Transfer Comments cueing for hand placement   Gait/Stairs (Locomotion)   [place] Level (Gait) contact guard;verbal cues   Assistive Device (Gait) walker, front-wheeled   Distance in Feet (Required for LE Total Joints) 3'   Pattern (Gait) step-to   Deviations/Abnormal Patterns (Gait) brian decreased;gait speed decreased   Clinical Impression   Criteria for Skilled Therapeutic Intervention yes, treatment indicated   PT Diagnosis (PT) impaired functional mobility   Influenced by the following impairments pain, weakness,  impaired balance   Functional limitations due to impairments gait, transfers, stairs   Clinical Presentation Evolving/Changing   Clinical Presentation Rationale pt presents as medically diagnosed   Clinical Decision Making (Complexity) moderate complexity   Therapy Frequency (PT) 2x/day   Predicted Duration of Therapy Intervention (days/wks) 3 days   Planned Therapy Interventions (PT) bed mobility training;gait training;home exercise program;patient/family education;stair training;strengthening;transfer training   Anticipated Equipment Needs at Discharge (PT) cane, straight;walker, rolling   Risk & Benefits of therapy have been explained care plan/treatment goals reviewed;patient   PT Discharge Planning    PT Discharge Recommendation (DC Rec) home with assist;home with home care physical therapy   PT Rationale for DC Rec home with assist from spouse as needed, Home PT for further rehabilitation   PT Brief overview of current status  will require 1-2 more PT sessions   Total Evaluation Time   Total Evaluation Time (Minutes) 5

## 2021-12-22 NOTE — PLAN OF CARE
Problem: Functional Ability Impaired (Spinal Surgery)  Goal: Optimal Functional Ability  Outcome: No Change  Intervention: Optimize Functional Status  Recent Flowsheet Documentation  Taken 12/22/2021 0254 by Julia Wallace, RN  Activity Management: activity adjusted per tolerance     Problem: Pain (Spinal Surgery)  Goal: Acceptable Pain Control  Outcome: No Change     Pt lethargic at start of shift, now awake and very painful. PRN meds utilized, ice applied to posterior neck incision. Incision- dressing was saturated with blood and had to be changed overnight. KARLEY drain intact to bulb suction  ~25ml output overnight. Pt has yet to get out of bed- very tearful and emotional when asked to do small tasks. Log rolled in bed for assessment. Babcock in place. Emotional support provided. Safety checks completed.

## 2021-12-22 NOTE — TELEPHONE ENCOUNTER
Reason for Call: , nurse care manager is calling on behalf of the patient. Looking for a hospital bed and shower bench order. Needs to be sent to [facility] [place].   Patient states she was working with provider to get an order. Had surgery yesterday, needs to have these delivered to her home before she discharges from the hospital tomorrow or Friday.    Detailed comments: please call  back when done.    Phone Number Patient can be reached at: Other phone number:  [phone]    Best Time: any    Can we leave a detailed message on this number? YES    Call taken on 12/22/2021 at 2:48 PM by Zayra Peterson

## 2021-12-22 NOTE — PROGRESS NOTES
12/22/21 0850   Quick Adds   Type of Visit Initial Occupational Therapy Evaluation   Living Environment   People in home spouse   Current Living Arrangements house  (old farm house)   Living Environment Comments Tub/shower with GB, RTS with GB on R   Self-Care   Usual Activity Tolerance good   Current Activity Tolerance moderate   Equipment Currently Used at Home raised toilet seat;other (see comments)  (reacher, needs new shower chair without backrest)   Disability/Function   Hearing Difficulty or Deaf no   Wear Glasses or Blind no   Change in Functional Status Since Onset of Current Illness/Injury yes  (Very emotionally anxious from surgery. )   General Information   Onset of Illness/Injury or Date of Surgery 12/21/21   Referring Physician Dr. Angela Gregory   Patient/Family Therapy Goal Statement (OT) To return home with 's help   Existing Precautions/Restrictions spinal;other (see comments)  (KARLEY drain.)   Bed Mobility   Bed Mobility rolling right;supine-sit   Rolling Right [place] (Bed Mobility) minimum assist (75% patient effort)   Supine-Sit [place] (Bed Mobility) minimum assist (75% patient effort)   Assistive Device (Bed Mobility) bed rails;draw sheet   Transfers   Transfers bed-chair transfer   Transfer Skill: Bed to Chair/Chair to Bed   Bed-Chair [place] (Transfers) minimum assist (75% patient effort)   Assistive Device (Bed-Chair Transfers) rolling walker   Transfer Comments Very anxious about moving.   Balance   Balance Assessment standing balance: dynamic   Standing Balance: Dynamic WFL   Activities of Daily Living   BADL Assessment lower body dressing   Lower Body Dressing Assessment   [place] Level (Lower Body Dressing) supervision   Position (Lower Body Dressing) supported sitting   Comment (Lower Body Dressing) VC to lift legs up to avoid bending forward.   Clinical Impression   Criteria for Skilled Therapeutic Interventions Met (OT) yes   OT Diagnosis decreased  indep with ADLs due to spine surgery   Assessment of Occupational Performance 3-5 Performance Deficits   Identified Performance Deficits dressing, trsfs, toileting, G/H.   Therapy Frequency (OT) 2x/day   Predicted Duration of Therapy 3 days   Anticipated Equipment Needs Upon Discharge (OT) shower chair   Risk & Benefits of therapy have been explained patient   OT Discharge Planning    OT Discharge Recommendation (DC Rec) home with home care occupational therapy;Home with assist   OT Rationale for DC Rec Pt will have assist from  .  Would benifit from home OT to assist with adaptive equipment and ADL safety.    Total Evaluation Time (Minutes)   Total Evaluation Time (Minutes) 15

## 2021-12-22 NOTE — PROGRESS NOTES
Summary/Update  66-year-old woman with a history of moderate pulmonary hypertension, coronary angiogram earlier this year showing normal vessels, hemochromatosis, obstructive sleep apnea, COPD with 50-pack-year smoking history quit 2000 who is admitted electively for decompression cervical spine with evidence of chronic cord compression neurosurgical team Dr. Gregory after all.  Surgery was earlier today  Preop history and physical from Dr. Torres reviewed  Medications reviewed and reconciled    Assessment/Plan/Narrative    Active Hospital Problems    Cord compression (H)      History of cervical spinal surgery      Cervical radiculopathy      Bipolar 2 disorder (H)      KYAW (obstructive sleep apnea)      Postablative hypothyroidism      Pulmonary hypertension (H)      Hemochromatosis, unspecified hemochromatosis type      COPD (chronic obstructive pulmonary disease) (H)    Plan per neurosurgical team medications have been continued she appears stable from a cardiovascular standpoint she has a pulmonary hypertension per coronary angiogram and cath earlier this year did indeed show the moderate pulmonary hypertension but coronary arteries were clear pain is controlled she is optimistic that she will start feeling better soon    Medical service will continue to follow while she is an inpatient    Code Status full code    Discharge Planning Home    Subjective:  She is seen postop and still sedated she is a bit weepy eyed and yet able to smile and extend An greetings she is eating some Jell-O she is flat in bed speech is somewhat thick no complaints of pain    Objective:  Less than 10 mL of urine   Vital signs in last 24 hours  Temp:  [97.3  F (36.3  C)-97.9  F (36.6  C)] 97.3  F (36.3  C)  Pulse:  [66-84] 71  Resp:  [11-18] 16  BP: (123-184)/(61-87) 136/77  MAP:  [82 mmHg-109 mmHg] 82 mmHg  Arterial Line BP: (120-142)/(57-83) 120/57  SpO2:  [94 %-99 %] 97 %  Weight:   [unfilled]    Intake/Output last 3  shifts  I/O last 3 completed shifts:  In: 50 [IV Piggyback:50]  Out: -   Intake/Output this shift:  I/O this shift:  In: 2100 [I.V.:2100]  Out: 1500 [Urine:1375; Drains:50; Blood:75]      Physical Exam  General: Flat in bed postop sedated somewhat  VS-see above  HEENT:  Lungs: Clear   cor: Regular  ABD:  Ext :  Neuro:    Pertinent Labs   Lab Results   Component Value Date    WBC 5.7 12/13/2021    HGB 15.7 12/13/2021    HCT 44.2 12/13/2021    MCV 97 12/13/2021     12/13/2021     Lab Results   Component Value Date    BUN 12 12/13/2021     12/13/2021    CO2 24 12/13/2021         Primo Malave MD.

## 2021-12-23 ENCOUNTER — TELEPHONE (OUTPATIENT)
Dept: FAMILY MEDICINE | Facility: CLINIC | Age: 66
End: 2021-12-23
Payer: MEDICARE

## 2021-12-23 ENCOUNTER — VIRTUAL VISIT (OUTPATIENT)
Dept: PSYCHOLOGY | Facility: CLINIC | Age: 66
End: 2021-12-23
Payer: MEDICARE

## 2021-12-23 ENCOUNTER — APPOINTMENT (OUTPATIENT)
Dept: RADIOLOGY | Facility: CLINIC | Age: 66
DRG: 519 | End: 2021-12-23
Attending: NURSE PRACTITIONER
Payer: MEDICARE

## 2021-12-23 ENCOUNTER — APPOINTMENT (OUTPATIENT)
Dept: PHYSICAL THERAPY | Facility: CLINIC | Age: 66
DRG: 519 | End: 2021-12-23
Attending: SURGERY
Payer: MEDICARE

## 2021-12-23 ENCOUNTER — APPOINTMENT (OUTPATIENT)
Dept: OCCUPATIONAL THERAPY | Facility: CLINIC | Age: 66
DRG: 519 | End: 2021-12-23
Attending: SURGERY
Payer: MEDICARE

## 2021-12-23 DIAGNOSIS — I27.20 PULMONARY HYPERTENSION (H): Primary | ICD-10-CM

## 2021-12-23 DIAGNOSIS — F41.1 GAD (GENERALIZED ANXIETY DISORDER): ICD-10-CM

## 2021-12-23 DIAGNOSIS — R06.02 SHORTNESS OF BREATH: ICD-10-CM

## 2021-12-23 DIAGNOSIS — F31.81 BIPOLAR 2 DISORDER (H): Primary | ICD-10-CM

## 2021-12-23 LAB
ANION GAP SERPL CALCULATED.3IONS-SCNC: 12 MMOL/L (ref 5–18)
BUN SERPL-MCNC: 8 MG/DL (ref 8–22)
CALCIUM SERPL-MCNC: 8.9 MG/DL (ref 8.5–10.5)
CHLORIDE BLD-SCNC: 104 MMOL/L (ref 98–107)
CO2 SERPL-SCNC: 25 MMOL/L (ref 22–31)
CREAT SERPL-MCNC: 0.68 MG/DL (ref 0.6–1.1)
ERYTHROCYTE [DISTWIDTH] IN BLOOD BY AUTOMATED COUNT: 12.3 % (ref 10–15)
GFR SERPL CREATININE-BSD FRML MDRD: >90 ML/MIN/1.73M2
GLUCOSE BLD-MCNC: 113 MG/DL (ref 70–125)
HCT VFR BLD AUTO: 42.4 % (ref 35–47)
HGB BLD-MCNC: 14.6 G/DL (ref 11.7–15.7)
MCH RBC QN AUTO: 34 PG (ref 26.5–33)
MCHC RBC AUTO-ENTMCNC: 34.4 G/DL (ref 31.5–36.5)
MCV RBC AUTO: 99 FL (ref 78–100)
PLATELET # BLD AUTO: 175 10E3/UL (ref 150–450)
POTASSIUM BLD-SCNC: 4 MMOL/L (ref 3.5–5)
RBC # BLD AUTO: 4.3 10E6/UL (ref 3.8–5.2)
SODIUM SERPL-SCNC: 141 MMOL/L (ref 136–145)
WBC # BLD AUTO: 8.7 10E3/UL (ref 4–11)

## 2021-12-23 PROCEDURE — 80048 BASIC METABOLIC PNL TOTAL CA: CPT | Performed by: NURSE PRACTITIONER

## 2021-12-23 PROCEDURE — 250N000011 HC RX IP 250 OP 636: Performed by: NURSE PRACTITIONER

## 2021-12-23 PROCEDURE — 250N000013 HC RX MED GY IP 250 OP 250 PS 637: Performed by: NURSE PRACTITIONER

## 2021-12-23 PROCEDURE — 99232 SBSQ HOSP IP/OBS MODERATE 35: CPT | Performed by: FAMILY MEDICINE

## 2021-12-23 PROCEDURE — 71045 X-RAY EXAM CHEST 1 VIEW: CPT

## 2021-12-23 PROCEDURE — 99232 SBSQ HOSP IP/OBS MODERATE 35: CPT | Performed by: NURSE PRACTITIONER

## 2021-12-23 PROCEDURE — 97535 SELF CARE MNGMENT TRAINING: CPT | Mod: GO

## 2021-12-23 PROCEDURE — 85027 COMPLETE CBC AUTOMATED: CPT | Performed by: NURSE PRACTITIONER

## 2021-12-23 PROCEDURE — 97530 THERAPEUTIC ACTIVITIES: CPT | Mod: GP

## 2021-12-23 PROCEDURE — 250N000013 HC RX MED GY IP 250 OP 250 PS 637

## 2021-12-23 PROCEDURE — 90834 PSYTX W PT 45 MINUTES: CPT | Mod: 95 | Performed by: SOCIAL WORKER

## 2021-12-23 PROCEDURE — 120N000001 HC R&B MED SURG/OB

## 2021-12-23 PROCEDURE — 250N000013 HC RX MED GY IP 250 OP 250 PS 637: Performed by: HOSPITALIST

## 2021-12-23 PROCEDURE — 250N000013 HC RX MED GY IP 250 OP 250 PS 637: Performed by: INTERNAL MEDICINE

## 2021-12-23 PROCEDURE — 99207 PR CDG-CUT & PASTE-POTENTIAL IMPACT ON LEVEL: CPT | Performed by: NURSE PRACTITIONER

## 2021-12-23 PROCEDURE — 250N000013 HC RX MED GY IP 250 OP 250 PS 637: Performed by: SURGERY

## 2021-12-23 PROCEDURE — 36415 COLL VENOUS BLD VENIPUNCTURE: CPT | Performed by: NURSE PRACTITIONER

## 2021-12-23 PROCEDURE — 97116 GAIT TRAINING THERAPY: CPT | Mod: GP

## 2021-12-23 RX ORDER — LIDOCAINE 4 G/G
2 PATCH TOPICAL EVERY 24 HOURS
COMMUNITY
Start: 2021-12-23 | End: 2021-12-25

## 2021-12-23 RX ORDER — ACETAMINOPHEN 325 MG/1
650 TABLET ORAL EVERY 4 HOURS PRN
COMMUNITY
Start: 2021-12-24 | End: 2023-07-20

## 2021-12-23 RX ORDER — POLYETHYLENE GLYCOL 3350 17 G/17G
17 POWDER, FOR SOLUTION ORAL DAILY
COMMUNITY
Start: 2021-12-24 | End: 2022-02-23

## 2021-12-23 RX ORDER — METHOCARBAMOL 500 MG/1
1000 TABLET, FILM COATED ORAL AT BEDTIME
Status: DISCONTINUED | OUTPATIENT
Start: 2021-12-23 | End: 2021-12-25 | Stop reason: HOSPADM

## 2021-12-23 RX ORDER — METHOCARBAMOL 500 MG/1
500 TABLET, FILM COATED ORAL 3 TIMES DAILY
Status: DISCONTINUED | OUTPATIENT
Start: 2021-12-23 | End: 2021-12-25 | Stop reason: HOSPADM

## 2021-12-23 RX ORDER — AMOXICILLIN 250 MG
1 CAPSULE ORAL 2 TIMES DAILY PRN
COMMUNITY
Start: 2021-12-23 | End: 2022-01-07

## 2021-12-23 RX ADMIN — ENOXAPARIN SODIUM 40 MG: 100 INJECTION SUBCUTANEOUS at 17:50

## 2021-12-23 RX ADMIN — OXYCODONE HYDROCHLORIDE 5 MG: 5 TABLET ORAL at 11:28

## 2021-12-23 RX ADMIN — POTASSIUM CHLORIDE 20 MEQ: 1500 TABLET, EXTENDED RELEASE ORAL at 09:04

## 2021-12-23 RX ADMIN — Medication 800 UNITS: at 09:05

## 2021-12-23 RX ADMIN — HYDROXYZINE HYDROCHLORIDE 25 MG: 25 TABLET ORAL at 12:59

## 2021-12-23 RX ADMIN — LIDOCAINE 2 PATCH: 246 PATCH TOPICAL at 09:07

## 2021-12-23 RX ADMIN — ACETAMINOPHEN 975 MG: 325 TABLET ORAL at 19:44

## 2021-12-23 RX ADMIN — HYDROXYZINE HYDROCHLORIDE 25 MG: 25 TABLET ORAL at 05:08

## 2021-12-23 RX ADMIN — ACETAMINOPHEN 975 MG: 325 TABLET ORAL at 11:28

## 2021-12-23 RX ADMIN — HYDROMORPHONE HYDROCHLORIDE 0.5 MG: 1 INJECTION, SOLUTION INTRAMUSCULAR; INTRAVENOUS; SUBCUTANEOUS at 00:19

## 2021-12-23 RX ADMIN — METHOCARBAMOL TABLETS 500 MG: 500 TABLET, COATED ORAL at 02:19

## 2021-12-23 RX ADMIN — ACETAMINOPHEN 975 MG: 325 TABLET ORAL at 03:09

## 2021-12-23 RX ADMIN — HYDROMORPHONE HYDROCHLORIDE 0.5 MG: 1 INJECTION, SOLUTION INTRAMUSCULAR; INTRAVENOUS; SUBCUTANEOUS at 21:06

## 2021-12-23 RX ADMIN — OXYCODONE HYDROCHLORIDE 5 MG: 5 TABLET ORAL at 02:19

## 2021-12-23 RX ADMIN — HYDROMORPHONE HYDROCHLORIDE 0.5 MG: 1 INJECTION, SOLUTION INTRAMUSCULAR; INTRAVENOUS; SUBCUTANEOUS at 05:08

## 2021-12-23 RX ADMIN — OXCARBAZEPINE 75 MG: 150 TABLET, FILM COATED ORAL at 09:05

## 2021-12-23 RX ADMIN — METHOCARBAMOL TABLETS 500 MG: 500 TABLET, COATED ORAL at 17:50

## 2021-12-23 RX ADMIN — Medication 250 MCG: at 09:04

## 2021-12-23 RX ADMIN — OXYCODONE HYDROCHLORIDE 10 MG: 5 TABLET ORAL at 09:18

## 2021-12-23 RX ADMIN — ROPINIROLE 2 MG: 1 TABLET, FILM COATED ORAL at 21:00

## 2021-12-23 RX ADMIN — ALBUTEROL SULFATE 2 PUFF: 90 INHALANT RESPIRATORY (INHALATION) at 09:08

## 2021-12-23 RX ADMIN — HYDROXYZINE HYDROCHLORIDE 25 MG: 25 TABLET ORAL at 23:22

## 2021-12-23 RX ADMIN — LEVOTHYROXINE SODIUM 150 MCG: 75 TABLET ORAL at 14:58

## 2021-12-23 RX ADMIN — LOSARTAN POTASSIUM 25 MG: 25 TABLET, FILM COATED ORAL at 09:04

## 2021-12-23 RX ADMIN — SENNOSIDES AND DOCUSATE SODIUM 1 TABLET: 50; 8.6 TABLET ORAL at 09:04

## 2021-12-23 RX ADMIN — B-COMPLEX W/ C & FOLIC ACID TAB 1 MG 1 TABLET: 1 TAB at 09:05

## 2021-12-23 RX ADMIN — HYDROMORPHONE HYDROCHLORIDE 0.5 MG: 1 INJECTION, SOLUTION INTRAMUSCULAR; INTRAVENOUS; SUBCUTANEOUS at 16:31

## 2021-12-23 RX ADMIN — Medication 1000 MCG: at 09:05

## 2021-12-23 RX ADMIN — PANTOPRAZOLE SODIUM 40 MG: 20 TABLET, DELAYED RELEASE ORAL at 06:45

## 2021-12-23 RX ADMIN — SENNOSIDES AND DOCUSATE SODIUM 2 TABLET: 50; 8.6 TABLET ORAL at 21:00

## 2021-12-23 RX ADMIN — ALBUTEROL SULFATE 2 PUFF: 90 INHALANT RESPIRATORY (INHALATION) at 14:52

## 2021-12-23 RX ADMIN — OXYCODONE HYDROCHLORIDE 10 MG: 5 TABLET ORAL at 23:21

## 2021-12-23 RX ADMIN — METHOCARBAMOL TABLETS 1000 MG: 500 TABLET, COATED ORAL at 21:00

## 2021-12-23 RX ADMIN — OXYCODONE HYDROCHLORIDE 10 MG: 5 TABLET ORAL at 19:48

## 2021-12-23 RX ADMIN — Medication 2 TABLET: at 11:30

## 2021-12-23 RX ADMIN — OXYCODONE HYDROCHLORIDE 5 MG: 5 TABLET ORAL at 14:51

## 2021-12-23 ASSESSMENT — ACTIVITIES OF DAILY LIVING (ADL)
ADLS_ACUITY_SCORE: 16
ADLS_ACUITY_SCORE: 17
ADLS_ACUITY_SCORE: 16
ADLS_ACUITY_SCORE: 17
ADLS_ACUITY_SCORE: 16
ADLS_ACUITY_SCORE: 16
ADLS_ACUITY_SCORE: 17
ADLS_ACUITY_SCORE: 16

## 2021-12-23 NOTE — PROGRESS NOTES
Neurosurgery progress note:    A/P:  Winnie is a 67yo F who is POD#2 s/p CERVICAL 3-CERVICAL 6 LEFT OPEN DOOR LAMINOPLASTY AND LEFT CERVICAL 4-5 AND CERVICAL 6-7 POSTERIOR FORAMINOTOMY by Dr Gregory. Preoperatively she complained of imbalance, numbness and tingling in her hands, fine motor difficulties, left shoulder pain, radiating left arm pain and numbness.    Winnie c/o continued posterior neck pain consistent with procedure. She has been able to get oob, complete ADLs and work with PT despite the pain. I provided a great amt of reassurance to patient that her symptoms are typical. Defer to pain team on pain mgmt. Her neuro exam is stable today. I see no abnormal swelling in area near her incision that she is concerned about. Her left arm pain from preop has otherwise resolved and her shannan hand numbness is getting better. Needs orthotics to refit her collar today. Will add cxr and some labs for her new cough today - needs IS 10x/hr.  D/w RN.        Plan:  1. Pain control (tylenol atc, robaxin TID prn and at bedtime, oxycodone 10mg Q3hrs prn, iv dilaudid). Appreciate pain team assistance with med mgmt. valium was discontinued 12/22 per pain team and of note, PCA was not started night of surgery.  2. PT/OT, ambulate patient, up to chair as much as able.  3. Cervical collar when upright or out of bed. Ok for collar to be off in bed. Orthotics consult for new collar today.  4. Cervical XR complete, reviewed.  5. Continue KARLEY drain, recording outputs Qshift even if 0. Please change incision dressing today.  6. dvt ppx lovenox to start today.  7. Care management consult for dispo planning.      Subjective:  Winnie reports continued bilateral posterior neck pain into both shoulders. Her left arm pain is better from preop. She no longer has the 'bees burning' sensation. The numbness and tingling in both hands from preop is somewhat better today as well. She has been ambulating, completing her ADLs. Worried about a bump behind  her neck. Concerned she has a cough/feels congested in her chest.    Exam:     General: lying in bed, appears anxious. Appears uncomfortable.     Strength:  5/5 bilaterally throughout upper and lower extremities, all planes.    Sensation: decreased to light touch throughout both hands and lateral aspect of left arm.     Incision: c/d/i with staples. Dressing was lifted for exam. Replaced. Pt points to an area left medial trap which she feels is swollen. This area looks wnl.    KARLEY drain: intact to bulb suction with small amt sanguinous output in bulb    MAEx4    Imaging:  Personally reviewed images  EXAM: XR CERVICAL SPINE 2/3 VWS  LOCATION: [facility]  DATE/TIME: 12/22/2021 9:37 AM     INDICATION: postop laminoplasty  COMPARISON: None.  TECHNIQUE: CR Cervical Spine.                                                                      IMPRESSION:   Postsurgical changes laminoplasty is at C3-C6. Surgical drain within the operative bed. Anterolisthesis of C7 on T1 measuring 2 mm. The vertebral bodies of the cervical spine otherwise have normal stature and alignment. Degenerative endplate change and   anterior marginal osteophytes at C3/C4-C6/C7.  Multilevel degenerative disc disease of the cervical spine disc height loss, most pronounced at C4/C5-C6/C7. No prevertebral soft tissue swelling. The partially imaged lung apices are unremarkable. Soft   tissues unremarkable.          Alexia Rubin FNP-C  [facility]  O. [phone]

## 2021-12-23 NOTE — PROGRESS NOTES
Progress Note    Patient Name: Randi Cleary  Date: 12/23/21         Service Type: Individual      Session Start Time: 2:02 PM  Session End Time: 2:47 PM     Session Length: 40 minutes     Session #: 73    Attendees: Client attended alone    Service Modality:  Video Visit:    Telemedicine Visit: The patient's condition can be safely assessed and treated via synchronous audio and visual telemedicine encounter.      Reason for Telemedicine Visit: Services only offered telehealth    Originating Site (Patient Location): hospital    Distant Site (Provider Location): Provider Remote Setting- Home Office    Consent:  The patient/guardian has verbally consented to: the potential risks and benefits of telemedicine (video visit) versus in person care; bill my insurance or make self-payment for services provided; and responsibility for payment of non-covered services.     Mode of Communication:  Video Conference via [employer]    As the provider I attest to compliance with applicable laws and regulations related to telemedicine.      Treatment Plan Last Reviewed: 12/10/21  PHQ-9 / CHAVO-7 :   PHQ 12/2/2021 12/6/2021 12/6/2021   PHQ-9 Total Score 13 18 18   Q9: Thoughts of better off dead/self-harm past 2 weeks Not at all More than half the days More than half the days   F/U: Thoughts of suicide or self-harm - Yes Yes   F/U: Self harm-plan - Yes Yes   F/U: Self-harm action - Yes Yes   F/U: Safety concerns - Yes Yes   Some encounter information is confidential and restricted. Go to Review Flowsheets activity to see all data.     CHAVO-7 SCORE 12/2/2021 12/14/2021 12/14/2021   Total Score 15 (severe anxiety) - 14 (moderate anxiety)   Total Score 15 14 14   Some encounter information is confidential and restricted. Go to Review Flowsheets activity to see all data.         DATA  Extended Session (53+ minutes): No  Interactive Complexity: No  Crisis: No      Progress Since Last Session  (Related to Symptoms / Goals / Homework):   Symptoms: patient is overwhelmed by pain     Homework: Partially completed  Put in order for psychological testing- after surgery as patient doesn't want a phone call right now  Consider completing health care directive     Episode of Care Goals: Minimal progress - ACTION (Actively working towards change); Intervened by reinforcing change plan / affirming steps taken     Current / Ongoing Stressors and Concerns:   Patient is currently socially isolated. She has a conflictual relationship with her .  She is getting minimal physical activity.  She had recent eye surgery     Treatment Objective(s) Addressed in This Session:   Patient will increase frequency of engaging her in ADLs.  Patient will track and record at least 5 pleasant exchanges with . Patient will be able to identify at least 5 positive traits about her .  Patient will reduce level of depressive and anxious features as evidenced by reduction in score on her CHAVO-7 and PHQ-9 (scores of 15 and 16 at first measurment, respectively).     Intervention:   Supportive Therapy: Discussed pain from her surgery and pending recovery. Talked about how short she is with others as she is in pain and recovering. Talked about how to ask for space when she's in pain. Also talked about how to distract herself from the pain, including sleep, reading, listening to music, watch movies, talking, focus on pain free areas of your body, break up difficult tasks, pet Slyvester      ASSESSMENT: Current Emotional / Mental Status (status of significant symptoms):   Risk status (Self / Other harm or suicidal ideation)   Patient denies current fears or concerns for personal safety.   Patient denies current or recent suicidal ideation or behaviors.    Patient denies current or recent homicidal ideation or behaviors.   Patient denies current or recent self injurious behavior or ideation.   Patient denies other safety  concerns.   Patient reports there has been no change in risk factors since their last session.     Patient reports there has been no change in protective factors since their last session.     A safety and risk management plan has been developed including: Patient consented to co-developed safety plan.  Safety and risk management plan was completed.  Patient agreed to use safety plan should any safety concerns arise.  A copy was given to the patient. This was done on 11/24/2020 and is attached to the bottom of the note.     Appearance:   Appropriate    Eye Contact:   Fair    Psychomotor Behavior: Normal    Attitude:   Cooperative    Orientation:   All   Speech    Rate / Production: Normal/ Responsive    Volume:  Normal    Mood:    Anxious    Affect:    Blunted    Thought Content:  Rumination    Thought Form:  Coherent    Insight:    Fair      Medication Review:   No changes to current psychiatric medication(s)      Medication Compliance:   Yes     Changes in Health Issues:   Yes: had surgery, now is in a lot of pain     Chemical Use Review:   Substance Use: decrease in use.  Patient reports frequency of use none since the third week of August.  Provided encouragement towards sobriety        Tobacco Use: No current tobacco use.      Diagnosis:  1. Bipolar 2 disorder (H)    2. CHAVO (generalized anxiety disorder)        Collateral Reports Completed:   Not Applicable    PLAN: (Patient Tasks / Therapist Tasks / Other)  Distract yourself from the pain by: sleeping, reading, listening to music, watch movies, talking, focus on pain free areas of your body, break up difficult tasks, pet Slyvester      Put in order for psychological testing- after surgery as patient doesn't want a phone call right now          MICAELA SLADE    December 23, 2021                                                         ______________________________________________________________________    Treatment Plan    Patient's Name: Randi NICOLE Morgan  "Evin  YOB: 1955    Date: 12/10/21    DSM5 Diagnoses: 296.32 (F33.1) Major Depressive Disorder, Recurrent Episode, Moderate With anxious distress  Psychosocial / Contextual Factors: Patient's entire family of origin has , she now has a sister-in-law and  as support.  Relationship with  is conflictual.  Patient is reporting increase in physical symptoms due to COVID-19.  Patient is off of pain medications.  WHODAS: 42    Referral / Collaboration:  Referral to another professional/service is not indicated at this time.     Anticipated number of session or this episode of care: 12-15      MeasurableTreatment Goal(s) related to diagnosis / functional impairment(s)  Goal 1: Patient will \"jumpstart, getting going with the things I need to be doing around the house as far as picking up, doing things, trying to do something every day.  Also to lessen the animosity between me and my .\"    I will know I've met my goal when my shoulders are fixed and I can see.      Objective #A (Patient Action)    Patient will increase frequency of engaging her in ADLs.  Status: Continued - Date(s): 12/10/21    Intervention(s)  Therapist will engage patient in CBT, specifically behavioral activation.    Objective #B  Patient will track and record at least 5 pleasant exchanges with . Patient will be able to identify at least 5 positive traits about her  and how he relates to her.  Status: Continued - Date(s): 12/10/21    Intervention(s)  Therapist will teach assertiveness skills and assign homework related to relationship interactions.    Objective #C  Patient will reduce level of depressive and anxious features as evidenced by reduction in score on her CHAVO-7 and PHQ-9 (scores of 15 and 16 at first measurment, respectively).  Status: Continued - Date(s):  12/10/21    Intervention(s)  Therapist will engage patient in person-centered therapy and CBT.    Patient has reviewed and agreed to the " "above plan.      CRUZ MICAELA BAKER JO  December 10, 2021                                                Randi Cleary          SAFETY PLAN:  Step 1: Warning signs / cues (Thoughts, images, mood, situation, behavior) that a crisis may be developing:  ? Thoughts: \"I don't want to continue\" \"I am unwanted\"  ? Images: none  ? Thinking Processes: ruminating  ? Mood: anger  ? Behaviors: isolating/withdrawing , can't stop crying, not taking care of myself and not taking care of my responsibilities  ? Situations: small triggers, such as not being able to find something, or dropping something   Step 2: Coping strategies - Things I can do to take my mind off of my problems without contacting another person (relaxation technique, physical activity):  ? Distress Tolerance Strategies:  arts and crafts: drawing, play with my pet , listen to positive and upbeat music: any, change body temperature (ice pack/cold water)  and paced breathing/progressive muscle relaxation  ? Physical Activities: go for a walk, deep breathing and stretching   ? Focus on helpful thoughts:  \"You've been through this before, you can get through it again.\"  Step 3: People and social settings that provide distraction:                 Name: Carmen                            Name: Darien                           Name: Aleida       ? pool, shopping, Carmen's house, [employer]       Step 4: Remind myself of people and things that are important to me and worth living for:  Clifford Little Donna, post-COVID world, options of what could be in your future        Step 5: When I am in crisis, I can ask these people to help me use my safety plan:                 Name: Sidney  Step 6: Making the environment safe:   ? go to sleep/daydream  Step 7: Professionals or agencies I can contact during a crisis:  ? [facility] Daytime Number: [phone]  ? Suicide Prevention Lifeline: 0-059-873-OJPR (2600)  ? Crisis Text Line Service (available 24 hours a day, 7 days a " week): Text [place] to 594031    Local Crisis Services: [facility] Crisis: [phone]     Call 911 or go to my nearest emergency department.       I helped develop this safety plan and agree to use it when needed.  I have been given a copy of this plan.       Client signature _________________________________________________________________  Today s date:  11/24/2020  Adapted from Safety Plan Template 2008 Randi Poole and Robby Barba is reprinted with the express permission of the authors.  No portion of the Safety Plan Template may be reproduced without the express, written permission.  You can contact the authors at [email or web address] or [email or web address].

## 2021-12-23 NOTE — DISCHARGE INSTRUCTIONS
Home Care Services have been arranged for you.  Home Care Agency:  [facility]  Home Care Agency Telephone:  [phone]  Services:  Physical Therapy, Occupational Therapy and Home Health Aide  Instructions:  Home Care agency will call you to scheduled initial visit

## 2021-12-23 NOTE — TELEPHONE ENCOUNTER
" is calling back to report that [facility] does not handle Medicare insurance for hospital beds. Would like the orders faxed to [facility]. Fax [phone].    Would also like \"please Expedite\" on the order so she can get it before she discharges.  "

## 2021-12-23 NOTE — PLAN OF CARE
Problem: Functional Ability Impaired (Spinal Surgery)  Goal: Optimal Functional Ability  Outcome: Improving     Problem: Pain (Spinal Surgery)  Goal: Acceptable Pain Control  Outcome: Improving     Patient ambulating with assist of 1.  Babcock pulled, patient voiding.  Patient reports pain moderate to severe and still requiring prn IV dilaudid.

## 2021-12-23 NOTE — PATIENT INSTRUCTIONS
Distract yourself from the pain by: sleeping, reading, listening to music, watch movies, talking, focus on pain free areas of your body, break up difficult tasks, pet Slyvester

## 2021-12-23 NOTE — PLAN OF CARE
Physical Therapy Discharge Summary    Reason for therapy discharge:    All goals and outcomes met, no further needs identified.    Progress towards therapy goal(s). See goals on Care Plan in [facility] electronic health record for goal details.  Goals met    Therapy recommendation(s):    Continued therapy is recommended.  Rationale/Recommendations:  Home PT.

## 2021-12-23 NOTE — PROGRESS NOTES
Care Management Follow Up    Length of Stay (days): 2    Expected Discharge Date: 12/23/2021     Concerns to be Addressed: discharge planning, post-op progression      Patient plan of care discussed at interdisciplinary rounds: Yes    Anticipated Discharge Disposition:  Home     Anticipated Discharge Services:  Home Care for PT, OT and HHA  Anticipated Discharge DME:  Hospital bed (in process)    Patient/family educated on Medicare website which has current facility and service quality ratings:  Yes  Education Provided on the Discharge Plan:  Yes, pt  Patient/Family in Agreement with the Plan:  Yes    Referrals Placed by CM/SW:  Home Care  Private pay costs discussed: Not applicable    Additional Information:  Call placed to [facility] and confirmed with Venice that they have received orders and paperwork and she will start to process.    Met with pt and provided update.    Received call back from Venice at [facility]. She states since pt has Medicare as primary insurance they to not handle those orders and bed would need to be obtained from a different company.  If pt pays out of pocket it would cost $310 for first month, then $180/month afterwards.     12:12 PM  Call placed to [facility], [phone], and confirmed with Regina they can process a hospital bed request under Medicare.  She states soonest date for delivery would be next week, but need to process order first.  Call placed to pt's PCP office and requested they re-fax the documents to [facility] at [phone].    3:15 PM  Met with pt and spouse to discuss discharge planning, along with pt's nurse.  Pt states physician told her they will plan to discharge her tomorrow.  Provided update to pt on hospital bed status and that this will not be delivered prior to discharge.  Pt became weepy and verbalized frustration with process of obtaining hospital bed.    Discussed various options at home. Discussed recommendations from therapy of home  with home care services.  Pt raised several concerns including not knowing how to get out of a bed that was flat if she slept in their bed at home, but then acknowledged she has been working on log-rolling with therapy.  She eventually stated she has been sleeping in her recliner for the past 2-3 years, but now feels that would not work. She states her  would need to help her every time she needs to get up.  said few words, but did state he will be off work for at least a week and is available to provide assistance.  Encouraged pt to continue working with therapy.  Discussed option of TCU but pt declines.  Also, since therapy is recommending home with home care it is unlikely TCU will accept pt.    Received call from [facility] stating they can accept pt for services of PT, OT and HHA with start of care on 12/29/2021.  Per [facility], the soonest they have availability is 12/30/2021.  [facility], the soonest they have availability is 1/3/2022.    Sherry Inman RN

## 2021-12-23 NOTE — PLAN OF CARE
Problem: Adult Inpatient Plan of Care  Goal: Optimal Comfort and Wellbeing  Outcome: Improving     Problem: Adjustment to Illness COPD (Chronic Obstructive Pulmonary Disease)  Goal: Optimal Chronic Illness Coping  Outcome: Improving   Coarse lung sounds. Not requiring oxygen.     Problem: Functional Ability Impaired (Spinal Surgery)  Goal: Optimal Functional Ability  Outcome: Improving   Patient concerned about how she will manage at home.   SW and PT/OT working with patient on options and skills.    Problem: Pain (Spinal Surgery)  Goal: Acceptable Pain Control  Outcome: Improving  Intervention: Prevent or Manage Pain  Recent Flowsheet Documentation  Taken 12/23/2021 0918 by Pamela Bliss, RN  Complementary Therapy: music therapy provided   Receiving oxycodone 10mg every 3 hours.  Hydroxyzine given for muscle spasms. Lidocaine patches in place. Ice used.

## 2021-12-23 NOTE — PLAN OF CARE
Problem: Functional Ability Impaired (Spinal Surgery)  Goal: Optimal Functional Ability  Outcome: Improving  Intervention: Optimize Functional Status     Problem: Pain (Spinal Surgery)  Goal: Acceptable Pain Control  Outcome: Improving     Posterior neck incision- dressing changed dt moderate amt bloody drainage. KARLEY intact to bulb suction. C/o pain at surgical site- managed with prn meds. A1 with walker OOB- neck brace to be worn when OOB. Voiding without difficulty. Bed alarm on.

## 2021-12-23 NOTE — TELEPHONE ENCOUNTER
Reason for Call:  Home Health Care    [facility] called regarding confirmation that Dr. Torres will be following patient's care for homecare.    No primary care provider documented. Last seen by Dr. Torres 12/13/2021 for pre-op.    Please call [facility] to confirm.      Phone Number Homecare Nurse can be reached at: [phone]    Can we leave a detailed message on this number? YES      Call taken on 12/23/2021 at 2:14 PM by Leah Pineda

## 2021-12-23 NOTE — PROGRESS NOTES
S: Pt. seen at [facility]. Female. Stuart CNP. RX: Eval. size of Trinity J. appears to large. DX: Cervical surgery.  O:A: Pt. presently wearing a Viptable J 300 CO. Pt. chin was almost inside of the CO upon arrival. I moved pillow down to support her shoulders and not push her head forward and tightened CO for a good fit. We did try a shorter CO and Pt. agreed that it did not fit as well. Rolan murillo wear and care instructions reviewed.  P: Pt. to be seen as needed.    Roly Roldan CO,LO

## 2021-12-23 NOTE — PROGRESS NOTES
[facility] MEDICINE  PROGRESS NOTE     Code Status: Full Code  Procedure(s):  CERVICAL 3-CERVICAL 6 LEFT OPEN DOOR LAMINOPLASTY AND LEFT CERVICAL 4-5 AND CERVICAL 6-7 POSTERIOR FORAMINOTOMY  2 Days Post-Op  Identification/Summary:   66-year-old woman with a history of moderate pulmonary hypertension, coronary angiogram earlier this year showing normal vessels, hemochromatosis, obstructive sleep apnea, COPD with 50-pack-year smoking history quit 2000.  12/21 admitted after C3-6 left open door laminoplasty and C4-5 C6-7 foraminotomy by Dr. Angela Gregory.  Struggling with postoperative pain control and KARLEY drain output.  Pain team consulted.     Assessment and Plan:  S/p C3-6 left open door laminoplasty and C4-5 C6-7 foraminotomy 12/21/2021  Spinal Stenosis with Neurogenic symptoms   Acute Post-Op Pain  Postoperative management per orthospine and pain team.  PT OT evaluation.   Follow KARLEY drain output per orthospine recommendations.  Bipolar 2 Disorder   MDD/Anxiety  Continue home medications.   Continue addition of hydroxyzine 25 mg as needed every 6-hours for anxiety.   Hypothyroidism  Continue home thyroid replacement/levothyroxine.   COPD  Pulmonary hypertension  Acute hypoxemic respiratory failure  Requiring 2 L nasal cannula oxygen.  Wean O2 as able.  Continue home inhalers and medications.   Nebulizer and RT pulmonary hygiene as needed.   Hemochromatosis  Noted  Continue Home meds ordered     COVID-19 PCR negative from 12/17/2021  History of Covid June 2021  Noted.  Standard precautions.  Anticoagulation   Lovenox 40 mg every 24 hours per surgery  Fluids: Saline lock  Pain meds: Per surgery and pain team  Therapy: PT OT  B[place]:Not present  Current Diet  Orders Placed This Encounter      Advance Diet as Tolerated: Regular Diet Adult    Supplements  None    Barriers to Discharge: Hypoxia, pain control and KARLEY output    Disposition: Per surgery    Interval  History/Subjective:  Patient having significant fluctuations in pain control.  Requiring 2 L nasal cannula oxygen.  No chest pain.  No nausea or vomiting.  Questions answered to verbalized satisfaction.    Physical Exam/Objective:  Temp:  [97.2  F (36.2  C)-98.5  F (36.9  C)] 97.2  F (36.2  C)  Pulse:  [86-96] 95  Resp:  [16] 16  BP: (129-134)/(62-68) 129/68  SpO2:  [88 %-96 %] 94 %  Wt Readings from Last 4 Encounters:   12/21/21 98 kg (216 lb 1.6 oz)   12/13/21 100.7 kg (222 lb)   11/16/21 98.6 kg (217 lb 4.8 oz)   11/01/21 99.5 kg (219 lb 6.4 oz)     Body mass index is 34.88 kg/m .    Constitutional: awake, alert, cooperative, no apparent distress, and appears stated age and moderately obese  ENT: Normocephalic, without obvious abnormality, atraumatic, external ears without lesions, oral pharynx with moist mucous membranes, tonsils without erythema or exudates, gums normal and good dentition.  Respiratory: No increased work of breathing, good air exchange, clear to auscultation bilaterally, no crackles or wheezing  Cardiovascular: Normal apical impulse, regular rate and rhythm, normal S1 and S2, no S3 or S4, and no murmur noted  GI: No scars, normal bowel sounds, soft, non-distended, non-tender, no masses palpated, no hepatosplenomegally  Skin: normal skin color, texture, turgor, no redness, warmth, or swelling, and no rashes  Musculoskeletal: There is no redness, warmth, or swelling of the joints.  Motor strength is 5 out of 5 all extremities bilaterally.  Tone is normal. no lower extremity pitting edema present  Neurologic: Cranial nerves II-XII are grossly intact. Sensory:  Sensory intact  Neuropsychiatric: General: normal, calm and normal eye contact Level of consciousness: alert / normal Affect: normal Orientation: oriented to self, place, time and situation Memory and insight: normal, memory for past and recent events intact and thought process normal      Medications:   Personally Reviewed.  Medications      phenylephrine Stopped (12/22/21 0056)     sodium chloride 75 mL/hr at 12/21/21 1919       acetaminophen  975 mg Oral Q8H     albuterol  2 puff Inhalation Q6H     cyanocobalamin  1,000 mcg Sublingual Daily     enoxaparin ANTICOAGULANT  40 mg Subcutaneous Q24H     Fluticasone-Umeclidin-Vilanterol  1 puff Inhalation Daily     levothyroxine  150 mcg Oral Daily     lidocaine  2 patch Transdermal Q24H     lidocaine   Transdermal Q8H     lidocaine   Transdermal Daily     losartan  25 mg Oral Daily     methocarbamol  1,000 mg Oral At Bedtime     methocarbamol  500 mg Oral TID     multivitamin RENAL  1 tablet Oral Daily     OXcarbazepine  75 mg Oral Daily     pantoprazole  40 mg Oral QAM AC     polyethylene glycol  17 g Oral Daily     potassium chloride ER  20 mEq Oral Daily     rOPINIRole  2-4 mg Oral At Bedtime     senna-docusate  1 tablet Oral BID    Or     senna-docusate  2 tablet Oral BID     Tab-A-Brandon/Iron  2 tablet Oral Daily     cholecalciferol  250 mcg Oral Daily     vitamin E  800 Units Oral Daily       Data reviewed today: I personally reviewed all new medications, labs, imaging/diagnostics reports over the past 24 hours. Pertinent findings include:    Imaging:   Recent Results (from the past 24 hour(s))   XR Chest Port 1 View    Narrative    EXAM: XR CHEST PORT 1 VIEW  LOCATION: [facility]  DATE/TIME: 12/23/2021 8:28 AM    INDICATION: Postop cough.  COMPARISON: 10/14/2021      Impression    IMPRESSION: Heart and mediastinal size are normal. No lobar infiltrate or pleural effusion. No pneumothorax. The lungs are hyperexpanded which can be seen with COPD.       Labs:  XR Chest Port 1 View   Preliminary Result   IMPRESSION: Heart and mediastinal size are normal. No lobar infiltrate or pleural effusion. No pneumothorax. The lungs are hyperexpanded which can be seen with COPD.      XR Cervical Spine 2/3 Views   Final Result   IMPRESSION:    Postsurgical changes laminoplasty is at C3-C6.  Surgical drain within the operative bed. Anterolisthesis of C7 on T1 measuring 2 mm. The vertebral bodies of the cervical spine otherwise have normal stature and alignment. Degenerative endplate change and    anterior marginal osteophytes at C3/C4-C6/C7.  Multilevel degenerative disc disease of the cervical spine disc height loss, most pronounced at C4/C5-C6/C7. No prevertebral soft tissue swelling. The partially imaged lung apices are unremarkable. Soft    tissues unremarkable.         Lateral Cervical Spine for Surgical Imaging  [XR CERVICAL SPINE PORT 1 VIEW   Final Result   IMPRESSION: Intraoperative view of the cervical spine demonstrates surgical instruments posterior to the posterior elements at the C3-C4 level.           Recent Results (from the past 24 hour(s))   CBC with platelets    Collection Time: 12/23/21  8:15 AM   Result Value Ref Range    WBC Count 8.7 4.0 - 11.0 10e3/uL    RBC Count 4.30 3.80 - 5.20 10e6/uL    Hemoglobin 14.6 11.7 - 15.7 g/dL    Hematocrit 42.4 35.0 - 47.0 %    MCV 99 78 - 100 fL    MCH 34.0 (H) 26.5 - 33.0 pg    MCHC 34.4 31.5 - 36.5 g/dL    RDW 12.3 10.0 - 15.0 %    Platelet Count 175 150 - 450 10e3/uL   Basic metabolic panel    Collection Time: 12/23/21  8:15 AM   Result Value Ref Range    Sodium 141 136 - 145 mmol/L    Potassium 4.0 3.5 - 5.0 mmol/L    Chloride 104 98 - 107 mmol/L    Carbon Dioxide (CO2) 25 22 - 31 mmol/L    Anion Gap 12 5 - 18 mmol/L    Urea Nitrogen 8 8 - 22 mg/dL    Creatinine 0.68 0.60 - 1.10 mg/dL    Calcium 8.9 8.5 - 10.5 mg/dL    Glucose 113 70 - 125 mg/dL    GFR Estimate >90 >60 mL/min/1.73m2       Pending Labs:  Unresulted Labs Ordered in the Past 30 Days of this Admission     Date and Time Order Name Status Description    12/17/2021  3:29 PM ETHYL GLUCURONIDE URINE In process           Talha Yoder MD  [facility]  Phone: #[phone]

## 2021-12-23 NOTE — TELEPHONE ENCOUNTER
Clarify with Anne-Marie at [facility]  They would like to know if Dr. English is willing to sign ongoing homecare order if needed, since original homecare order were from the hospital.  Please advise.

## 2021-12-23 NOTE — PROGRESS NOTES
[facility] ACUTE PAIN SERVICE    Daily PAIN Progress Note    Assessment/Plan:  Randi Cleary is a 66 year old female who was admitted on 12/21/2021.  Pain team was asked to see the patient for chronic pain, post op pain. Admitted for cervical laminoplasty. History of moderate pulmonary hypertension, coronary angiogram earlier this year showing normal vessels, hemochromatosis, obstructive sleep apnea, COPD with 50-pack-year smoking history quit 2000 who is admitted electively for decompression cervical spine with evidence of chronic cord compression neurosurgical team. Describes pain as 8/10 and aching. The patient denies N/V/C/D. Diet is normal. The patient does no longer smoke and denies chemical dependency history.   Patient uses medical cannabis as outpatient.     Post op day: 2 Day Post-Op.   CERVICAL 3-CERVICAL 6 LEFT OPEN DOOR LAMINOPLASTY AND LEFT CERVICAL 4-5 AND CERVICAL 6-7 POSTERIOR FORAMINOTOMY  Since surgery, patient has utilized 20mg of oxycodone and 1mg of IV Dilaudid bumpms     Opioid Induced Respiratory Depression Risk Assessment:?high (post op, age, multiple opioids, concurrent use of benzodiazepines and medical cannabis, copd, jose maria)?      In 24 hours patient has used 35 mg oxycodone and 2mg Dilaudid = MME 93. Home MME = 5mg.     PLAN:   1) Pain is consistent with acute post op pain, chronic pain. Home MME is Norco 5-325 mg take one tab at bedtime, 5mg MME).   2)Multimodal Medication Therapy  Topical:  lidocaine patch   NSAID'S: CrCl 99ml/min.  none  Muscle Relaxants: Robaxin 1000 mg at bedime (home med) and tid  Adjuvants: APAP 975mg q8h  Antidepressants/anxiolytics: Valium 5mg q4hprn - discontinue as do not recommend concurrent use with opioids given increased for risk for respiratory depression, Atarax 10mg q6hprn, reports she uses medical cannabis at home for anxiety   Opioids: oxycoodne 5-10mg q3hprn  IV Pain medication: Dilaudid PCA - discontinue- was never started post op, IV  Dilaudid 0.5-1mg q2hprn - change to Dilaudid 0.5 mg q4h prn   3)Non-medication interventions: Ice, PT/OT  Acupuncture consult, Integrative consult - if patient agreeable   4)Constipation Prophylaxis: Scheduled and prn: Senna S scheduled 1-2tab daily  5) Care Teams: neurosurgery     -Opioid prescriber has been Dr Dusty Dubois  -[place]  pulled from system on 10/22. This indicates:   12/20/2021 Norco 5-325 10 tabs fr 10 days. Same RX on 12/10/21, 12/02/21, 11/20/21, 11/12/21  Reports also uses medical cannabis, robaxin  Discharge Recommendations - We recommend prescribing the following at the time of discharge: per surgery     Subjective:  Patient reports posterior neck, bilateral shoulder pain, constant, aching, sharp, sore. LUE pain better post op. She is up in chair and has been ambulating. Reports she has a cough and chest xray was completed.     Active Problems:    Hemochromatosis, unspecified hemochromatosis type    Pulmonary hypertension (H)    Postablative hypothyroidism    KYAW (obstructive sleep apnea)    COPD (chronic obstructive pulmonary disease) (H)    Bipolar 2 disorder (H)    Cord compression (H)    History of cervical spinal surgery    Cervical radiculopathy      Objective:  Vital signs in last 24 hours:  /68 (BP Location: Right arm)   Pulse 95   Temp 97.2  F (36.2  C) (Oral)   Resp 16   Wt 98 kg (216 lb 1.6 oz)   SpO2 94%   BMI 34.88 kg/m    Weight:   Vitals:    12/21/21 1159   Weight: 98 kg (216 lb 1.6 oz)      Weight change:   Body mass index is 34.88 kg/m .    Intake/Output last 3 shifts:  I/O last 3 completed shifts:  In: 760 [P.O.:760]  Out: 2640 [Urine:2550; Drains:90]  Intake/Output this shift:  I/O this shift:  In: 240 [P.O.:240]  Out: -     Review of Systems:   As per subjective, all others negative.    Physical Exam:  General Appearance:  Alert, cooperative, no distress   Head:  Normocephalic, without obvious abnormality, atraumatic   Eyes:  PERRL, conjunctiva/corneas clear,  EOM's intact   Nose: Nares normal, septum midline   Throat: Lips, mucosa, and tongue normal; teeth and gums normal   Neck: [place] J on, incision covered, drain intact    Back:   Symmetric, no curvature, ROM normal   Lungs:   Clear to auscultation bilaterally, respirations unlabored   Chest Wall:  No tenderness or deformity   Heart:  Regular rate and rhythm, S1, S2 normal    Abdomen:   Soft, non-tender, bowel sounds active all four quadrants,  no masses, no organomegaly    Extremities: Extremities normal, atraumatic, no cyanosis or edema   Skin: Skin warm, dry   Neurologic: Alert and oriented X 3, Moves all 4 extremities, sensation decreased to light touch throughout both hands and lateral aspect of left arm     Imaging: Reviewed      Labs: Reviewed   Recent Results (from the past 24 hour(s))   CBC with platelets    Collection Time: 12/23/21  8:15 AM   Result Value Ref Range    WBC Count 8.7 4.0 - 11.0 10e3/uL    RBC Count 4.30 3.80 - 5.20 10e6/uL    Hemoglobin 14.6 11.7 - 15.7 g/dL    Hematocrit 42.4 35.0 - 47.0 %    MCV 99 78 - 100 fL    MCH 34.0 (H) 26.5 - 33.0 pg    MCHC 34.4 31.5 - 36.5 g/dL    RDW 12.3 10.0 - 15.0 %    Platelet Count 175 150 - 450 10e3/uL   Basic metabolic panel    Collection Time: 12/23/21  8:15 AM   Result Value Ref Range    Sodium 141 136 - 145 mmol/L    Potassium 4.0 3.5 - 5.0 mmol/L    Chloride 104 98 - 107 mmol/L    Carbon Dioxide (CO2) 25 22 - 31 mmol/L    Anion Gap 12 5 - 18 mmol/L    Urea Nitrogen 8 8 - 22 mg/dL    Creatinine 0.68 0.60 - 1.10 mg/dL    Calcium 8.9 8.5 - 10.5 mg/dL    Glucose 113 70 - 125 mg/dL    GFR Estimate >90 >60 mL/min/1.73m2       Total time spent 25 minutes with greater than 50% in consultation, education and coordination of care, examination of patient, review of medical record, lab and imaging results, completion of documentation, and discussion with RN, MD, Senior Acupuncturist, and pharmacist.      Arianna ASHLEY, FNP-C  Acute Care Pain Management Program  [facility] ([facility], [place], [place])  Monday-Friday 8a-4p   Page via online paging system or call [phone]

## 2021-12-24 ENCOUNTER — APPOINTMENT (OUTPATIENT)
Dept: OCCUPATIONAL THERAPY | Facility: CLINIC | Age: 66
DRG: 519 | End: 2021-12-24
Attending: SURGERY
Payer: MEDICARE

## 2021-12-24 PROCEDURE — 97535 SELF CARE MNGMENT TRAINING: CPT | Mod: GO

## 2021-12-24 PROCEDURE — 250N000013 HC RX MED GY IP 250 OP 250 PS 637: Performed by: NURSE PRACTITIONER

## 2021-12-24 PROCEDURE — 99232 SBSQ HOSP IP/OBS MODERATE 35: CPT | Performed by: FAMILY MEDICINE

## 2021-12-24 PROCEDURE — 250N000011 HC RX IP 250 OP 636: Performed by: NURSE PRACTITIONER

## 2021-12-24 PROCEDURE — 250N000013 HC RX MED GY IP 250 OP 250 PS 637: Performed by: HOSPITALIST

## 2021-12-24 PROCEDURE — 97530 THERAPEUTIC ACTIVITIES: CPT | Mod: GO

## 2021-12-24 PROCEDURE — 120N000001 HC R&B MED SURG/OB

## 2021-12-24 PROCEDURE — 250N000013 HC RX MED GY IP 250 OP 250 PS 637: Performed by: SURGERY

## 2021-12-24 PROCEDURE — 99232 SBSQ HOSP IP/OBS MODERATE 35: CPT | Performed by: NURSE PRACTITIONER

## 2021-12-24 PROCEDURE — 250N000013 HC RX MED GY IP 250 OP 250 PS 637: Performed by: INTERNAL MEDICINE

## 2021-12-24 RX ORDER — HYDROCODONE BITARTRATE AND ACETAMINOPHEN 5; 325 MG/1; MG/1
1-2 TABLET ORAL EVERY 4 HOURS PRN
Status: DISCONTINUED | OUTPATIENT
Start: 2021-12-24 | End: 2021-12-25 | Stop reason: HOSPADM

## 2021-12-24 RX ORDER — HYDROMORPHONE HYDROCHLORIDE 1 MG/ML
0.5 INJECTION, SOLUTION INTRAMUSCULAR; INTRAVENOUS; SUBCUTANEOUS EVERY 6 HOURS PRN
Status: DISCONTINUED | OUTPATIENT
Start: 2021-12-24 | End: 2021-12-25

## 2021-12-24 RX ORDER — ACETAMINOPHEN 325 MG/1
325 TABLET ORAL EVERY 4 HOURS PRN
Status: DISCONTINUED | OUTPATIENT
Start: 2021-12-24 | End: 2021-12-25 | Stop reason: HOSPADM

## 2021-12-24 RX ADMIN — HYDROCODONE BITARTRATE AND ACETAMINOPHEN 2 TABLET: 5; 325 TABLET ORAL at 13:14

## 2021-12-24 RX ADMIN — HYDROCODONE BITARTRATE AND ACETAMINOPHEN 2 TABLET: 5; 325 TABLET ORAL at 19:49

## 2021-12-24 RX ADMIN — ROPINIROLE 4 MG: 1 TABLET, FILM COATED ORAL at 21:57

## 2021-12-24 RX ADMIN — B-COMPLEX W/ C & FOLIC ACID TAB 1 MG 1 TABLET: 1 TAB at 08:20

## 2021-12-24 RX ADMIN — HYDROXYZINE HYDROCHLORIDE 25 MG: 25 TABLET ORAL at 10:27

## 2021-12-24 RX ADMIN — METHOCARBAMOL TABLETS 500 MG: 500 TABLET, COATED ORAL at 16:27

## 2021-12-24 RX ADMIN — Medication 800 UNITS: at 08:21

## 2021-12-24 RX ADMIN — HYDROMORPHONE HYDROCHLORIDE 0.5 MG: 1 INJECTION, SOLUTION INTRAMUSCULAR; INTRAVENOUS; SUBCUTANEOUS at 17:48

## 2021-12-24 RX ADMIN — PANTOPRAZOLE SODIUM 40 MG: 20 TABLET, DELAYED RELEASE ORAL at 06:59

## 2021-12-24 RX ADMIN — ALBUTEROL SULFATE 2 PUFF: 90 INHALANT RESPIRATORY (INHALATION) at 08:22

## 2021-12-24 RX ADMIN — LEVOTHYROXINE SODIUM 150 MCG: 75 TABLET ORAL at 08:22

## 2021-12-24 RX ADMIN — POLYETHYLENE GLYCOL 3350 17 G: 17 POWDER, FOR SOLUTION ORAL at 08:21

## 2021-12-24 RX ADMIN — OXYCODONE HYDROCHLORIDE 10 MG: 5 TABLET ORAL at 10:27

## 2021-12-24 RX ADMIN — OXYCODONE HYDROCHLORIDE 10 MG: 5 TABLET ORAL at 04:04

## 2021-12-24 RX ADMIN — METHOCARBAMOL TABLETS 1000 MG: 500 TABLET, COATED ORAL at 21:57

## 2021-12-24 RX ADMIN — Medication 250 MCG: at 08:21

## 2021-12-24 RX ADMIN — METHOCARBAMOL TABLETS 500 MG: 500 TABLET, COATED ORAL at 08:20

## 2021-12-24 RX ADMIN — OXYCODONE HYDROCHLORIDE 10 MG: 5 TABLET ORAL at 06:59

## 2021-12-24 RX ADMIN — LOSARTAN POTASSIUM 25 MG: 25 TABLET, FILM COATED ORAL at 08:20

## 2021-12-24 RX ADMIN — ACETAMINOPHEN 975 MG: 325 TABLET ORAL at 02:31

## 2021-12-24 RX ADMIN — METHOCARBAMOL TABLETS 500 MG: 500 TABLET, COATED ORAL at 13:14

## 2021-12-24 RX ADMIN — Medication 1000 MCG: at 08:20

## 2021-12-24 RX ADMIN — POTASSIUM CHLORIDE 20 MEQ: 1500 TABLET, EXTENDED RELEASE ORAL at 08:20

## 2021-12-24 RX ADMIN — HYDROXYZINE HYDROCHLORIDE 25 MG: 25 TABLET ORAL at 16:27

## 2021-12-24 RX ADMIN — SENNOSIDES AND DOCUSATE SODIUM 1 TABLET: 50; 8.6 TABLET ORAL at 08:20

## 2021-12-24 RX ADMIN — ALBUTEROL SULFATE 2 PUFF: 90 INHALANT RESPIRATORY (INHALATION) at 19:53

## 2021-12-24 RX ADMIN — ENOXAPARIN SODIUM 40 MG: 100 INJECTION SUBCUTANEOUS at 19:50

## 2021-12-24 RX ADMIN — OXCARBAZEPINE 75 MG: 150 TABLET, FILM COATED ORAL at 08:21

## 2021-12-24 RX ADMIN — HYDROMORPHONE HYDROCHLORIDE 0.5 MG: 1 INJECTION, SOLUTION INTRAMUSCULAR; INTRAVENOUS; SUBCUTANEOUS at 02:36

## 2021-12-24 RX ADMIN — LIDOCAINE 2 PATCH: 246 PATCH TOPICAL at 08:21

## 2021-12-24 ASSESSMENT — ACTIVITIES OF DAILY LIVING (ADL)
ADLS_ACUITY_SCORE: 16
ADLS_ACUITY_SCORE: 8
DIFFICULTY_COMMUNICATING: NO
TOILETING_ISSUES: NO
ADLS_ACUITY_SCORE: 8
ADLS_ACUITY_SCORE: 16
ADLS_ACUITY_SCORE: 8
ADLS_ACUITY_SCORE: 16
DRESSING/BATHING_DIFFICULTY: NO
ADLS_ACUITY_SCORE: 16
ADLS_ACUITY_SCORE: 8
CONCENTRATING,_REMEMBERING_OR_MAKING_DECISIONS_DIFFICULTY: NO
DIFFICULTY_EATING/SWALLOWING: NO
ADLS_ACUITY_SCORE: 16
WALKING_OR_CLIMBING_STAIRS_DIFFICULTY: NO
ADLS_ACUITY_SCORE: 16
ADLS_ACUITY_SCORE: 8
ADLS_ACUITY_SCORE: 16
ADLS_ACUITY_SCORE: 16
FALL_HISTORY_WITHIN_LAST_SIX_MONTHS: NO
DOING_ERRANDS_INDEPENDENTLY_DIFFICULTY: NO
ADLS_ACUITY_SCORE: 16
ADLS_ACUITY_SCORE: 16
ADLS_ACUITY_SCORE: 8
WEAR_GLASSES_OR_BLIND: NO
ADLS_ACUITY_SCORE: 9

## 2021-12-24 NOTE — PLAN OF CARE
Problem: Pain (Spinal Surgery)  Goal: Acceptable Pain Control  Outcome: No Change   Pt emotionally labile, hard to get a fair pain rating with her (always stating 8-10/10 even when observed to be laying in bed comfortable and resting). PRN meds utilized consistently along with ice to surgical site. KARLEY drain pulled- dressing changed, small amt drainage noted on bandage. Bed alarm on, safety checks completed.

## 2021-12-24 NOTE — PROGRESS NOTES
Neurosurgery progress note:    A/P:  Winnie is a 65yo F who is POD#3 s/p CERVICAL 3-CERVICAL 6 LEFT OPEN DOOR LAMINOPLASTY AND LEFT CERVICAL 4-5 AND CERVICAL 6-7 POSTERIOR FORAMINOTOMY by Dr Gregory. Preoperatively she complained of imbalance, numbness and tingling in her hands, fine motor difficulties, left shoulder pain, radiating left arm pain and numbness.    Winnie c/o continued posterior neck pain consistent with procedure. She tells me she feels better today than yesterday. She is concerned about going home and being able to manage the stairs in her home as well as her pain. Long discussion about option of TCU. She is relucntant to go to TCU with COVID risk. She will discuss with her spouse today what adjustments he has been able to achieve at home making the first level of their home manageable. Seems reasonable to have care management also discuss TCU with patient. Her left arm pain from preop has otherwise resolved and her shannan hand numbness is getting better. Orthotics evidently felt her collar fits well - just needed straps adjusted. On NC today and has congested cough. Needs IS every 1 hour while awake. Scheduled home inhalers are ordered. CXR done 12/23 and is unremarkable. Defer any further work up to hospitalist.     Discharge potential later today vs tomorrow with safe discharge plan in place and adequate pain control.   Care management to discuss option of TCU with patient.     Plan:  1. Pain control (tylenol. robaxin TID prn and at bedtime, oxycodone 10mg Q3hrs prn)  Appreciate pain team assistance with med mgmt. valium was discontinued 12/22 per pain team and of note, PCA was not started night of surgery.  2. PT/OT, ambulate patient, up to chair as much as able.  3. Cervical collar when upright or out of bed. Ok for collar to be off in bed.   4. Cervical XR complete, reviewed.  5. Wean nasal cannula - hourly IS   6. dvt ppx lovenox to start today.  7. Care management consult for dispo  planning.      Subjective:  Winnie reports continued bilateral posterior neck pain into both shoulders. She feels it is better than yesterday. Her left arm pain is better from preop. The numbness and tingling in both hands from preop is somewhat better today as well.     Exam:   General: lying in bed, somewhat drowsy but more awake by the end of our visit.  Appears uncomfortable. Very anxious about going home and being able to manage.     Strength:  5/5 bilaterally throughout upper and lower extremities, all planes with exception of slight weakness right hand grasp 4+/5    Sensation: decreased to light touch throughout both hands and lateral aspect of left arm.     Incision: c/d/i with staples. Dressing changed personally. Skin seems to bulk distal wound. No drainage, redness.     Imaging:  Personally reviewed images    AZUCENA Max  [facility] Neurosurgery  O: [phone]

## 2021-12-24 NOTE — PROGRESS NOTES
[facility] MEDICINE  PROGRESS NOTE     Code Status: Full Code  Procedure(s):  CERVICAL 3-CERVICAL 6 LEFT OPEN DOOR LAMINOPLASTY AND LEFT CERVICAL 4-5 AND CERVICAL 6-7 POSTERIOR FORAMINOTOMY  3 Days Post-Op  Identification/Summary:   66-year-old woman with a history of moderate pulmonary hypertension, coronary angiogram earlier this year showing normal vessels, hemochromatosis, obstructive sleep apnea, COPD with 50-pack-year smoking history quit 2000.  12/21 admitted after C3-6 left open door laminoplasty and C4-5 C6-7 foraminotomy by Dr. Angela Gregory.  Postoperative pain control improved with pain team assistance.  KARLEY drain output per neurosurgery.  Medically stable.     Assessment and Plan:  S/p C3-6 left open door laminoplasty and C4-5 C6-7 foraminotomy 12/21/2021  Spinal Stenosis with Neurogenic symptoms   Acute Post-Op Pain  Postoperative management per neurosurgery and pain team.  PT OT evaluation.   Follow KARLEY drain output per spine recommendations.  Bipolar 2 Disorder   MDD/Anxiety  Continue home medications.   Continue addition of hydroxyzine 25 mg as needed every 6-hours for anxiety.   Hypothyroidism  Continue home thyroid replacement/levothyroxine.   COPD  Pulmonary hypertension  Acute hypoxemic respiratory failure  Obstructive sleep apnea  Patient declining to use CPAP in hospital.  Requiring 2 L nasal cannula oxygen.  Wean O2 as able.  Continue home inhalers and medications.   Nebulizer and RT pulmonary hygiene as needed.   12/24   patient now agreeable to utilize CPAP.  Orders placed.  Hemochromatosis  Noted  Continue Home meds ordered      COVID-19 PCR negative from 12/17/2021  History of Covid June 2021  Noted.  Standard precautions.  Anticoagulation   Lovenox 40 mg every 24 hours per surgery  Fluids: Saline lock  Pain meds: Per surgery and pain team  Therapy: PT OT Home with home care recommended  [place]:Not present  Current Diet  Orders Placed This Encounter       Advance Diet as Tolerated: Regular Diet Adult      Discharge Instruction - Regular Diet Adult    Supplements  None    Barriers to Discharge: Medically stable for discharge by neurosurgery.    Disposition: Discharge medication reconciliation reviewed and our area of responsibility was addressed.    Interval History/Subjective:  Patient's pain control has improved.  Does have her CPAP machine here and is agreeable to use it tonight.  No chest pain.  No shortness of breath.  No nausea or vomiting.  Uncertain if she is ready for discharge or not.  Questions answered to verbalized satisfaction.    Physical Exam/Objective:  Temp:  [98  F (36.7  C)-98.3  F (36.8  C)] 98.1  F (36.7  C)  Pulse:  [82-95] 95  Resp:  [14-17] 17  BP: (115-132)/(53-76) 129/63  SpO2:  [88 %-95 %] 92 %  Wt Readings from Last 4 Encounters:   12/21/21 98 kg (216 lb 1.6 oz)   12/13/21 100.7 kg (222 lb)   11/16/21 98.6 kg (217 lb 4.8 oz)   11/01/21 99.5 kg (219 lb 6.4 oz)     Body mass index is 34.88 kg/m .    Constitutional: awake, alert, cooperative, no apparent distress, and appears stated age and seated up in a chair.  Appears much more comfortable than yesterday.  ENT: Unable to examine secondary to neck brace  Respiratory: No increased work of breathing, good air exchange, clear to auscultation bilaterally, no crackles or wheezing  Cardiovascular: Normal apical impulse, regular rate and rhythm, normal S1 and S2, no S3 or S4, and no murmur noted  GI: No scars, normal bowel sounds, soft, non-distended, non-tender, no masses palpated, no hepatosplenomegally  Skin: normal skin color, texture, turgor, no redness, warmth, or swelling, and no rashes  Musculoskeletal: There is no redness, warmth, or swelling of the joints.  Motor strength is 5 out of 5 all extremities bilaterally.  Tone is normal. no lower extremity pitting edema present  Neurologic: Cranial nerves II-XII are grossly intact. Sensory:  Sensory intact  Neuropsychiatric: General: normal,  calm and normal eye contact Level of consciousness: alert / normal Affect: normal Orientation: oriented to self, place, time and situation Memory and insight: normal, memory for past and recent events intact and thought process normal      Medications:   Personally Reviewed.  Medications       albuterol  2 puff Inhalation Q6H     cyanocobalamin  1,000 mcg Sublingual Daily     enoxaparin ANTICOAGULANT  40 mg Subcutaneous Q24H     Fluticasone-Umeclidin-Vilanterol  1 puff Inhalation Daily     levothyroxine  150 mcg Oral Daily     lidocaine  2 patch Transdermal Q24H     lidocaine   Transdermal Q8H     lidocaine   Transdermal Daily     losartan  25 mg Oral Daily     methocarbamol  1,000 mg Oral At Bedtime     methocarbamol  500 mg Oral TID     multivitamin RENAL  1 tablet Oral Daily     OXcarbazepine  75 mg Oral Daily     pantoprazole  40 mg Oral QAM AC     polyethylene glycol  17 g Oral Daily     potassium chloride ER  20 mEq Oral Daily     rOPINIRole  2-4 mg Oral At Bedtime     senna-docusate  1 tablet Oral BID    Or     senna-docusate  2 tablet Oral BID     Tab-A-Brandon/Iron  2 tablet Oral Daily     cholecalciferol  250 mcg Oral Daily     vitamin E  800 Units Oral Daily       Data reviewed today: I personally reviewed all new medications, labs, imaging/diagnostics reports over the past 24 hours. Pertinent findings include:    Imaging:   No results found for this or any previous visit (from the past 24 hour(s)).    Labs:  XR Chest Port 1 View   Final Result   IMPRESSION: Heart and mediastinal size are normal. No lobar infiltrate or pleural effusion. No pneumothorax. The lungs are hyperexpanded which can be seen with COPD.      XR Cervical Spine 2/3 Views   Final Result   IMPRESSION:    Postsurgical changes laminoplasty is at C3-C6. Surgical drain within the operative bed. Anterolisthesis of C7 on T1 measuring 2 mm. The vertebral bodies of the cervical spine otherwise have normal stature and alignment. Degenerative  endplate change and    anterior marginal osteophytes at C3/C4-C6/C7.  Multilevel degenerative disc disease of the cervical spine disc height loss, most pronounced at C4/C5-C6/C7. No prevertebral soft tissue swelling. The partially imaged lung apices are unremarkable. Soft    tissues unremarkable.         Lateral Cervical Spine for Surgical Imaging  [XR CERVICAL SPINE PORT 1 VIEW   Final Result   IMPRESSION: Intraoperative view of the cervical spine demonstrates surgical instruments posterior to the posterior elements at the C3-C4 level.           No results found for this or any previous visit (from the past 24 hour(s)).    Pending Labs:  Unresulted Labs Ordered in the Past 30 Days of this Admission     Date and Time Order Name Status Description    12/17/2021  3:29 PM ETHYL GLUCURONIDE URINE In process             Talha Yoder MD  [facility]  Phone: #[phone]

## 2021-12-24 NOTE — PROGRESS NOTES
[facility] ACUTE PAIN SERVICE    Daily PAIN Progress Note    Assessment/Plan:  Randi Cleary is a 66 year old female who was admitted on 12/21/2021.  Pain team was asked to see the patient for chronic pain, post op pain. Admitted for cervical laminoplasty. History of moderate pulmonary hypertension, coronary angiogram earlier this year showing normal vessels, hemochromatosis, obstructive sleep apnea, COPD with 50-pack-year smoking history quit 2000 who is admitted electively for decompression cervical spine with evidence of chronic cord compression neurosurgical team. Describes pain as 8-10/10 and aching, sore, sharp in her posterior neck and bilateral shoulders. The patient denies N/V/C/D. Diet is normal. The patient does no longer smoke and denies chemical dependency history.   Patient uses medical cannabis as outpatient.     Post op day: 3 Day Post-Op.   CERVICAL 3-CERVICAL 6 LEFT OPEN DOOR LAMINOPLASTY AND LEFT CERVICAL 4-5 AND CERVICAL 6-7 POSTERIOR FORAMINOTOMY  Since surgery, patient has utilized 20mg of oxycodone and 1mg of IV Dilaudid bumpms     Opioid Induced Respiratory Depression Risk Assessment:?high (post op, age, multiple opioids, concurrent use of benzodiazepines and medical cannabis, copd, jose maria)?      In 24 hours patient has used 60 mg oxycodone and 1.5mg Dilaudid. Home MME = 5mg.     PLAN:   1) Pain is consistent with acute post op pain, chronic pain. Home MME is Norco 5-325 mg take one tab at bedtime, 5mg MME   2)Multimodal Medication Therapy  Topical:  lidocaine patch   NSAID'S: CrCl 99ml/min.  none  Muscle Relaxants: Robaxin 1000 mg at bedime (home med) and 500 mg tid  Adjuvants: APAP 975mg q8h  Antidepressants/anxiolytics: Valium 5mg q4hprn - discontinue as do not recommend concurrent use with opioids given increased for risk for respiratory depression, Atarax 10mg q6hprn, reports she uses medical cannabis at home for anxiety   Opioids: oxycoodne 5-10mg q3hprn - discontinue reports  not effective, RASS -1, on NC O2 today. Trial Norco 5-325 mg 1-2 tabs q4h prn.   IV Pain medication: Dilaudid PCA - discontinue- was never started post op, IV Dilaudid 0.5 mg q6h prn   3)Non-medication interventions: Ice, PT/OT  Acupuncture consult, Integrative consult - if patient agreeable   4)Constipation Prophylaxis: Scheduled and prn: Senna S scheduled 1-2tab daily  5) Care Teams: neurosurgery     -Opioid prescriber has been Dr Dusty Dubois  -[place]  pulled from [place] on 10/22. This indicates:   12/20/2021 Norco 5-325 10 tabs fr 10 days. Same RX on 12/10/21, 12/02/21, 11/20/21, 11/12/21  Reports also uses medical cannabis, robaxin  Discharge Recommendations - We recommend prescribing the following at the time of discharge: per surgery     Subjective:  Patient reports posterior neck, bilateral shoulder pain, constant, aching, sharp, sore rating this 8-10/10. LUE pain better post op. She is up in chair and has been ambulating. She appears drowsy, sedated, lethargic, keeps eyes closed during visit. Hard to stay on track with conversation due to being drowsy. Reports nothing seems to help pain but does feel muscle pain better today with Robaxin.      Active Problems:    Hemochromatosis, unspecified hemochromatosis type    Pulmonary hypertension (H)    Postablative hypothyroidism    KYAW (obstructive sleep apnea)    COPD (chronic obstructive pulmonary disease) (H)    Bipolar 2 disorder (H)    Cord compression (H)    History of cervical spinal surgery    Cervical radiculopathy      Objective:  Vital signs in last 24 hours:  /53 (BP Location: Right arm)   Pulse 82   Temp 98  F (36.7  C) (Oral)   Resp 16   Wt 98 kg (216 lb 1.6 oz)   SpO2 94%   BMI 34.88 kg/m    Weight:     Vitals:    12/21/21 1159   Weight: 98 kg (216 lb 1.6 oz)      Weight change:   Body mass index is 34.88 kg/m .    Intake/Output last 3 shifts:  I/O last 3 completed shifts:  In: 1240 [P.O.:1240]  Out: 1230 [Urine:1200;  Drains:30]  Intake/Output this shift:  No intake/output data recorded.    Review of Systems:   As per subjective, all others negative.    Physical Exam:  General Appearance:  Appears drowsy, RASS -1   Head:  Normocephalic, without obvious abnormality, atraumatic   Eyes:  PERRL, conjunctiva/corneas clear, EOM's intact   Nose: Nares normal, septum midline   Throat: Lips, mucosa, and tongue normal; teeth and gums normal   Neck: Sycuan J on, incision covered   Back:   Symmetric, no curvature, ROM normal   Lungs:   Clear to auscultation bilaterally, respirations unlabored   Chest Wall:  No tenderness or deformity   Heart:  Regular rate and rhythm, S1, S2 normal    Abdomen:   Soft, non-tender, bowel sounds active all four quadrants,  no masses, no organomegaly    Extremities: Extremities normal, atraumatic, no cyanosis or edema   Skin: Skin warm, dry   Neurologic: Alert and oriented X 3, Moves all 4 extremities, sensation decreased to light touch throughout both hands and lateral aspect of left arm     Imaging: Reviewed      Labs: Reviewed   No results found for this or any previous visit (from the past 24 hour(s)).    Total time spent 25 minutes with greater than 50% in consultation, education and coordination of care, examination of patient, review of medical record, lab and imaging results, completion of documentation, and discussion with RN, MD, Senior Acupuncturist, and pharmacist.      SURNIDER Blanton-CHANTAL  Acute Care Pain Management Program  [facility] ([place], [place], [place])  Monday-Friday 8a-4p   Page via online paging system or call [phone]

## 2021-12-24 NOTE — PLAN OF CARE
Patient vital signs are at baseline: Yes  Patient able to ambulate as they were prior to admission or with assist devices provided by therapies during their stay:  Yes  Patient MUST void prior to discharge:  Yes  Patient able to tolerate oral intake:  Yes  Pain has adequate pain control using Oral analgesics:  Yes    Patient complaining of increased posterior neck pain throughout the morning. Pain medication regimen changed to PRN Norco and scheduled Robaxin. Scant amount of dried drainage present on dressing. Neck collar worn while out of bed. Ambulating with assistance of 1 and walker. Tolerating oral intake. VSS.

## 2021-12-24 NOTE — PROGRESS NOTES
"Care Management Follow Up    Length of Stay (days): 3    Expected Discharge Date: 12/25/2021     Concerns to be Addressed:  discharge planning, Home Care referral, pain management     Patient plan of care discussed at interdisciplinary rounds: Yes    Anticipated Discharge Disposition:  Home     Anticipated Discharge Services:  Home Care for PT, OT and HHA  Anticipated Discharge DME:  Walker per PT    Patient/family educated on Medicare website which has current facility and service quality ratings: yes   Education Provided on the Discharge Plan:  Yes, to pt and spouse  Patient/Family in Agreement with the Plan:  yes    Referrals Placed by CM/SW:  Home Care  Private pay costs discussed: Not applicable    Additional Information:  Call placed to spouse, Sidney to discuss discharge planning. He confirms both pt and himself do not want TCU placement.  Also, PT has recommended home with home care so pt does not meet criteria for TCU.  Sidney states he has been working on making sure there are clear pathways for ambulation and is working on securing recliner to an area pt would like. He confirms he will be with pt 24/7 for at [facility] the next week and can assist with all cares. Informed about Home Care services for PT, OT and HHA starting on 12/29/2021 and he feels comfortable managing cares and needs until home care services start.  He states pt has had a chronic issue with pain and \"knew it would be an issue\".    He feels they have a safe plan for pt to return home with his assistance and pt will continue to sleep in her recliner as she has done for the past 2-3 years.      Met with pt to discuss discharge planning. Reviewed information regarding Home Care services and she is agreeable with this plan. She states physician has indicated to her that she might discharge tomorrow.    She states she feels she and her  have a safe plan for return home with his assistance and pt plans to sleep in her recliner.  If she remains " interested in a hospital bed she will f/u with her PCP.    3:26 PM  Notified [facility] that pt will not be discharging today and anticipate discharge tomorrow.    Sherry Inman RN

## 2021-12-25 VITALS
SYSTOLIC BLOOD PRESSURE: 114 MMHG | BODY MASS INDEX: 34.88 KG/M2 | DIASTOLIC BLOOD PRESSURE: 64 MMHG | TEMPERATURE: 98.1 F | RESPIRATION RATE: 17 BRPM | HEART RATE: 87 BPM | WEIGHT: 216.1 LBS | OXYGEN SATURATION: 95 %

## 2021-12-25 DIAGNOSIS — M54.12 CERVICAL RADICULOPATHY: Chronic | ICD-10-CM

## 2021-12-25 DIAGNOSIS — G95.20 CORD COMPRESSION (H): Chronic | ICD-10-CM

## 2021-12-25 PROCEDURE — 250N000013 HC RX MED GY IP 250 OP 250 PS 637: Performed by: INTERNAL MEDICINE

## 2021-12-25 PROCEDURE — 250N000013 HC RX MED GY IP 250 OP 250 PS 637: Performed by: HOSPITALIST

## 2021-12-25 PROCEDURE — 250N000013 HC RX MED GY IP 250 OP 250 PS 637: Performed by: NURSE PRACTITIONER

## 2021-12-25 PROCEDURE — 250N000011 HC RX IP 250 OP 636: Performed by: NURSE PRACTITIONER

## 2021-12-25 PROCEDURE — 250N000013 HC RX MED GY IP 250 OP 250 PS 637: Performed by: SURGERY

## 2021-12-25 RX ORDER — METHOCARBAMOL 500 MG/1
500 TABLET, FILM COATED ORAL 4 TIMES DAILY
Qty: 40 TABLET | Refills: 0 | Status: SHIPPED | OUTPATIENT
Start: 2021-12-25 | End: 2021-12-26

## 2021-12-25 RX ORDER — HYDROXYZINE HYDROCHLORIDE 25 MG/1
25 TABLET, FILM COATED ORAL EVERY 6 HOURS PRN
Qty: 30 TABLET | Refills: 0 | Status: SHIPPED | OUTPATIENT
Start: 2021-12-25 | End: 2022-01-07

## 2021-12-25 RX ORDER — HYDROCODONE BITARTRATE AND ACETAMINOPHEN 5; 325 MG/1; MG/1
1-2 TABLET ORAL EVERY 4 HOURS PRN
Qty: 45 TABLET | Refills: 0 | Status: SHIPPED | OUTPATIENT
Start: 2021-12-25 | End: 2021-12-25

## 2021-12-25 RX ORDER — HYDROXYZINE HYDROCHLORIDE 25 MG/1
25 TABLET, FILM COATED ORAL EVERY 6 HOURS PRN
Qty: 30 TABLET | Refills: 0 | Status: SHIPPED | OUTPATIENT
Start: 2021-12-25 | End: 2021-12-25

## 2021-12-25 RX ORDER — HYDROCODONE BITARTRATE AND ACETAMINOPHEN 5; 325 MG/1; MG/1
1-2 TABLET ORAL EVERY 4 HOURS PRN
Qty: 45 TABLET | Refills: 0 | Status: SHIPPED | OUTPATIENT
Start: 2021-12-25 | End: 2021-12-30

## 2021-12-25 RX ORDER — LIDOCAINE 4 G/G
2 PATCH TOPICAL EVERY 24 HOURS
Qty: 15 PATCH | Refills: 0 | Status: SHIPPED | OUTPATIENT
Start: 2021-12-25 | End: 2022-04-06

## 2021-12-25 RX ORDER — MULTIPLE VITAMINS W/ MINERALS TAB 9MG-400MCG
1 TAB ORAL DAILY
Status: DISCONTINUED | OUTPATIENT
Start: 2021-12-25 | End: 2021-12-25 | Stop reason: HOSPADM

## 2021-12-25 RX ADMIN — Medication 1000 MCG: at 08:53

## 2021-12-25 RX ADMIN — METHOCARBAMOL TABLETS 500 MG: 500 TABLET, COATED ORAL at 08:52

## 2021-12-25 RX ADMIN — Medication 800 UNITS: at 09:25

## 2021-12-25 RX ADMIN — HYDROCODONE BITARTRATE AND ACETAMINOPHEN 2 TABLET: 5; 325 TABLET ORAL at 12:52

## 2021-12-25 RX ADMIN — POTASSIUM CHLORIDE 20 MEQ: 1500 TABLET, EXTENDED RELEASE ORAL at 08:52

## 2021-12-25 RX ADMIN — MULTIPLE VITAMINS W/ MINERALS TAB 1 TABLET: TAB at 08:58

## 2021-12-25 RX ADMIN — HYDROMORPHONE HYDROCHLORIDE 0.5 MG: 1 INJECTION, SOLUTION INTRAMUSCULAR; INTRAVENOUS; SUBCUTANEOUS at 06:31

## 2021-12-25 RX ADMIN — HYDROCODONE BITARTRATE AND ACETAMINOPHEN 2 TABLET: 5; 325 TABLET ORAL at 00:25

## 2021-12-25 RX ADMIN — ALBUTEROL SULFATE 2 PUFF: 90 INHALANT RESPIRATORY (INHALATION) at 02:26

## 2021-12-25 RX ADMIN — OXCARBAZEPINE 75 MG: 150 TABLET, FILM COATED ORAL at 08:53

## 2021-12-25 RX ADMIN — ALBUTEROL SULFATE 2 PUFF: 90 INHALANT RESPIRATORY (INHALATION) at 09:00

## 2021-12-25 RX ADMIN — ACETAMINOPHEN 325 MG: 325 TABLET ORAL at 12:52

## 2021-12-25 RX ADMIN — METHOCARBAMOL TABLETS 500 MG: 500 TABLET, COATED ORAL at 12:52

## 2021-12-25 RX ADMIN — Medication 250 MCG: at 08:55

## 2021-12-25 RX ADMIN — HYDROCODONE BITARTRATE AND ACETAMINOPHEN 2 TABLET: 5; 325 TABLET ORAL at 08:52

## 2021-12-25 RX ADMIN — PANTOPRAZOLE SODIUM 40 MG: 20 TABLET, DELAYED RELEASE ORAL at 06:30

## 2021-12-25 RX ADMIN — HYDROXYZINE HYDROCHLORIDE 25 MG: 25 TABLET ORAL at 12:52

## 2021-12-25 RX ADMIN — SENNOSIDES AND DOCUSATE SODIUM 1 TABLET: 50; 8.6 TABLET ORAL at 08:52

## 2021-12-25 RX ADMIN — LOSARTAN POTASSIUM 25 MG: 25 TABLET, FILM COATED ORAL at 08:52

## 2021-12-25 RX ADMIN — LEVOTHYROXINE SODIUM 150 MCG: 75 TABLET ORAL at 06:30

## 2021-12-25 RX ADMIN — B-COMPLEX W/ C & FOLIC ACID TAB 1 MG 1 TABLET: 1 TAB at 08:53

## 2021-12-25 RX ADMIN — HYDROXYZINE HYDROCHLORIDE 25 MG: 25 TABLET ORAL at 00:26

## 2021-12-25 ASSESSMENT — ACTIVITIES OF DAILY LIVING (ADL)
ADLS_ACUITY_SCORE: 9
ADLS_ACUITY_SCORE: 8
ADLS_ACUITY_SCORE: 9
ADLS_ACUITY_SCORE: 8
ADLS_ACUITY_SCORE: 9

## 2021-12-25 NOTE — PROGRESS NOTES
Patient discharging home via  transport. After visit summary reviewed with patient and questions were answered. Neck pain managed with PRN Norco/ Hydroxyzine and scheduled Robaxin. VSS.

## 2021-12-25 NOTE — PLAN OF CARE
Patient vital signs are at baseline: Yes  Patient able to ambulate as they were prior to admission or with assist devices provided by therapies during their stay:  Yes  Patient MUST void prior to discharge:  Yes  Patient able to tolerate oral intake:  Yes  Pain has adequate pain control using Oral analgesics:  Yes    Pain was controlled with Norco. Voiding and eating adequately. VSS. No additional drainage noted on Mepilex on neck.     Anna Ching RN

## 2021-12-25 NOTE — PROGRESS NOTES
[facility] ACUTE PAIN SERVICE    ([facility], [facility], [facility])   Daily PAIN Progress Note    Assessment/Plan:  Randi Cleary is a 66 year old female who was admitted on 12/21/2021.  Pain team was asked to see the patient for chronic pain, post op pain. Admitted for cervical laminoplasty. History of moderate pulmonary hypertension, coronary angiogram earlier this year showing normal vessels, hemochromatosis, obstructive sleep apnea, COPD with 50-pack-year smoking history quit 2000 who is admitted electively for decompression cervical spine with evidence of chronic cord compression neurosurgical team. Describes pain as  8-10/10 and aching, sore, sharp in her posterior neck and bilateral shoulders. She reports some tingling in fingertips. She was coughing at time during visit which appeared to worsen pain in neck. Patient is anxious about going home, and asked about going home on both Norco and po Dilaudid, but I explained that we recommend just taking one opioid at a time, and the po Dilaudid would work about the same as the Norco, she thought it would work like IV Dilaudid. She feels the methocarbamol during the day is helpful, and the Lidocaine patches are helpful.  Patient realizes she needs to focus on being calm, and that would be helpful in dealing with the pain.   The patient denies N/V/C/D. Diet is normal. The patient does no longer smoke and denies chemical dependency history.   Patient uses medical cannabis as outpatient.     Post op day: 4 Day Post-Op.   CERVICAL 3-CERVICAL 6 LEFT OPEN DOOR LAMINOPLASTY AND LEFT CERVICAL 4-5 AND CERVICAL 6-7 POSTERIOR FORAMINOTOMY  Since surgery, patient has utilized 20mg of oxycodone and 1mg of IV Dilaudid bumpms     Opioid Induced Respiratory Depression Risk Assessment:?high (post op, age, multiple opioids, concurrent use of benzodiazepines and medical cannabis, copd, jose maria)?      In 24 hours patient has used 10 mg oxycodone(now discontinued),  and 6 tabs of Norco 5/325 and 2 doses of Dilaudid IV 0.5 mg each, for total MME of 75 mg.    No changes recommended, patient did inquire if she was getting Rx for po Dilaudid as well as Norco, and I told her we do not recommend being on two different oral short acting opioids.  Discussed with Stacia Britton, CNS, Acute Pain Management Team     PLAN:   1) Pain is consistent with acute post op pain, chronic pain. Home MME is Norco 5-325 mg take one tab at bedtime, 5mg MME   2)Multimodal Medication Therapy  Topical:  lidocaine patch 2 daily  NSAID'S: CrCl 99ml/min.  none  Muscle Relaxants: Robaxin 1000 mg at bedime (home med) and 500 mg tid  Adjuvants: Tylenol 325 mg q 4  h prn  Antidepressants/anxiolytics:  Atarax 10mg q6hprn, reports she uses medical cannabis at home for anxiety   Opioids:  Norco 5-325 mg 1-2 tabs q4h prn.   IV Pain medication:  Dilaudid 0.5 mg q6h prn   3)Non-medication interventions: Ice, PT/OT  Acupuncture consult, Integrative consult - if patient agreeable   4)Constipation Prophylaxis: Scheduled and prn: Senna S scheduled 1-2tab bid, Miralax daily  5) Care Teams: Neurosurgery   -Opioid prescriber has been Dr Dusty Dubois  -[facility] pulled from system on 12/22. This indicates:   12/20/2021 Norco 5-325 10 tabs for 10 days. Same RX on 12/10/21, 12/02/21, 11/20/21, 11/12/21  Reports also uses medical cannabis, robaxin  Discharge Recommendations - We recommend prescribing the following at the time of discharge: per surgery     Active Problems:    Hemochromatosis, unspecified hemochromatosis type    Pulmonary hypertension (H)    Postablative hypothyroidism    KYAW (obstructive sleep apnea)    COPD (chronic obstructive pulmonary disease) (H)    Bipolar 2 disorder (H)    Cord compression (H)    History of cervical spinal surgery    Cervical radiculopathy     LOS: 4 days     Albina Castillo [facility]  Acute Care Pain Management Program  [facility] ([place], [place], [place])   With questions call [phone]  Preference  if for Lisha Lennie - Reba  Click HERE to page Stacia

## 2021-12-25 NOTE — PROGRESS NOTES
Neurosurgery progress note:    A/P:  Winnie is a 65yo F who is POD#4 s/p CERVICAL 3-CERVICAL 6 LEFT OPEN DOOR LAMINOPLASTY AND LEFT CERVICAL 4-5 AND CERVICAL 6-7 POSTERIOR FORAMINOTOMY by Dr Gregory. Preoperatively she complained of imbalance, numbness and tingling in her hands, fine motor difficulties, left shoulder pain, radiating left arm pain and numbness.    Pain improving, Winnie is hesitant to discharge - various concerns about home, pain, etc. Hopefully home later today with .     Plan:  1. Pain control (tylenol. robaxin TID prn and at bedtime, norco started and this is better.)  2. PT/OT, ambulate patient, up to chair as much as able.  3. Cervical collar when upright or out of bed. Ok for collar to be off in bed.   4. IS         Subjective:  Winnie reports continued bilateral posterior neck pain into both shoulders. Continues to feel improvement as the days go by.   Exam:   General: Awake, alert and oriented. . Very anxious about going home and being able to manage.     Strength:  5/5 bilaterally throughout upper and lower extremities, all planes with exception of slight weakness right hand grasp 4+/5    Sensation: decreased to light touch throughout both hands and lateral aspect of left arm.     Incision: c/d/i with staples. Dressing changed personally. Skin seems to bulk distal wound. No drainage, redness.     Imaging:  Personally reviewed images    Kassandra Tobin CNP  [facility] Neurosurgery  O: [phone]  P: [phone]

## 2021-12-25 NOTE — PLAN OF CARE
Problem: Adult Inpatient Plan of Care  Goal: Absence of Hospital-Acquired Illness or Injury  Intervention: Prevent and Manage VTE (Venous Thromboembolism) Risk  Recent Flowsheet Documentation  Taken 12/24/2021 1700 by Cari Howard RN  VTE Prevention/Management:   ambulation promoted   anticoagulant therapy maintained   dorsiflexion/plantar flexion performed   fluids promoted   AROM (active range of motion) performed  Pt wearing pneumatic compression devices while in bed.    Problem: Pain (Spinal Surgery)  Goal: Acceptable Pain Control  Outcome: Improving  Intervention: Prevent or Manage Pain  Recent Flowsheet Documentation  Taken 12/24/2021 1949 by Cari Howard, RN  Pain Management Interventions: medication (see MAR)  Taken 12/24/2021 1748 by Cari Howard RN  Pain Management Interventions:   medication (see MAR)   distraction   emotional support     Pt is A/O, utilizes call light appropriately for assist. Bed alarms for safety. Is A x 1 w/ FWW and gait belt, wears [place]-J cervical collar while OOB and ambulating. Voiding well. Rates pain consistently as severe, managing with PRN IV dilaudid x 1, PRN norco, scheduled robaxin, ice and repositioning. Plan is to discharge home in AM with spouse.

## 2021-12-25 NOTE — PROGRESS NOTES
Care Management Discharge Note    Discharge Date: 12/25/2021       Discharge Disposition: Home Care    Discharge Services: None    Discharge DME: Other (see comment) (Hospital bed )    Discharge Transportation: family or friend will provide    Private pay costs discussed: Not applicable    PAS Confirmation Code:  N/A  Patient/family educated on Medicare website which has current facility and service quality ratings: no    Education Provided on the Discharge Plan: Yes    Persons Notified of Discharge Plans: Pt  Patient/Family in Agreement with the Plan: yes    Handoff Referral Completed: Yes     Additional Information:  [facility] called [facility] [phone]  to check on bed status and office currently closed.  [facility] faxed discharge orders to [facility].       TANIKA Puri

## 2021-12-26 DIAGNOSIS — G95.20 CORD COMPRESSION (H): Chronic | ICD-10-CM

## 2021-12-26 LAB
ETHANOL UR QL CFM: NEGATIVE
ETHYL GLUCURONIDE UR QL SCN: NOT DETECTED NG/MG CREAT
ETHYL SULFATE/CREAT UR: NOT DETECTED NG/MG CREAT
LEVEL OF DETECTION (ETHANOL): NORMAL

## 2021-12-26 RX ORDER — METHOCARBAMOL 500 MG/1
500 TABLET, FILM COATED ORAL 4 TIMES DAILY
Qty: 40 TABLET | Refills: 0 | Status: SHIPPED | OUTPATIENT
Start: 2021-12-26 | End: 2022-01-14

## 2021-12-26 NOTE — PLAN OF CARE
Occupational Therapy Discharge Summary    Reason for therapy discharge:    Discharged to home.    Progress towards therapy goal(s). See goals on Care Plan in [facility] electronic health record for goal details.  Goals partially met.  Barriers to achieving goals:   discharge from facility.    Therapy recommendation(s):    Continued therapy is recommended.  Rationale/Recommendations:  further progress goals.

## 2021-12-27 ENCOUNTER — PATIENT OUTREACH (OUTPATIENT)
Dept: CARE COORDINATION | Facility: CLINIC | Age: 66
End: 2021-12-27
Payer: MEDICARE

## 2021-12-27 NOTE — PROGRESS NOTES
Clinic Care Coordination Contact    The CHW received a referral today regarding the patient but she is currently enrolled in the Care Coordination program already. No outreach will be done at this time.     Jeannette Lara  Community Health Worker   [facility]  Care Coordination  Meredith Hickman Rogers, [place], [facility]  [email or web address]   Office: [phone]

## 2021-12-28 ENCOUNTER — VIRTUAL VISIT (OUTPATIENT)
Dept: PSYCHOLOGY | Facility: CLINIC | Age: 66
End: 2021-12-28
Payer: MEDICARE

## 2021-12-28 ENCOUNTER — TELEPHONE (OUTPATIENT)
Dept: NEUROSURGERY | Facility: CLINIC | Age: 66
End: 2021-12-28
Payer: MEDICARE

## 2021-12-28 ENCOUNTER — PATIENT OUTREACH (OUTPATIENT)
Dept: CARE COORDINATION | Facility: CLINIC | Age: 66
End: 2021-12-28
Payer: MEDICARE

## 2021-12-28 DIAGNOSIS — F31.81 BIPOLAR 2 DISORDER (H): Primary | ICD-10-CM

## 2021-12-28 DIAGNOSIS — F41.1 GAD (GENERALIZED ANXIETY DISORDER): ICD-10-CM

## 2021-12-28 DIAGNOSIS — M54.12 CERVICAL RADICULOPATHY: Primary | ICD-10-CM

## 2021-12-28 PROCEDURE — 90834 PSYTX W PT 45 MINUTES: CPT | Mod: 95 | Performed by: SOCIAL WORKER

## 2021-12-28 NOTE — TELEPHONE ENCOUNTER
Patient is requesting a return call. She has several post op questions.     Best number to reach her: [phone]

## 2021-12-28 NOTE — PROGRESS NOTES
"Clinic Care Coordination Contact  [facility]: Post-Discharge Note  SITUATION                                                      Admission:    Admission Date: 12/21/21   Reason for Admission: CERVICAL 3-CERVICAL 6 LEFT OPEN DOOR LAMINOPLASTY AND LEFT CERVICAL 4-5 AND CERVICAL 6-7 POSTERIOR FORAMINOTOMY  Discharge:   Discharge Date: 12/25/21  Discharge Diagnosis: CERVICAL 3-CERVICAL 6 LEFT OPEN DOOR LAMINOPLASTY AND LEFT CERVICAL 4-5 AND CERVICAL 6-7 POSTERIOR FORAMINOTOMY    BACKGROUND                                                          ASSESSMENT      Enrollment  Primary Care Care Coordination Status: Maintenance  Clinical Pathway Name: None  Outreach Frequency: 2 months    Discharge Assessment  How are you doing now that you are home?: Spoke with spouse Sidney.  Patient was on a Virtual Visit with her Therapist.  She is going to call Dr. Gregory today to discuss \"how she is feeling\".  How are your symptoms? (Red Flag symptoms escalate to triage hotline per guidelines): Other (See above)  Does the patient have their discharge instructions? : Yes  Does the patient have questions regarding their discharge instructions? : No  Were you started on any new medications or were there changes to any of your previous medications? : Yes  Does the patient have all of their medications?: Yes  Do you have questions regarding any of your medications? : No  Do you have all of your needed medical supplies or equipment (DME)?  (i.e. oxygen tank, CPAP, cane, etc.): No - What equipment or supplies are needed? (Do not have the Hospital Bed yet.  Was told Dr Torres needs to finish paperwork.  RN will forward a note to Dr. Torres.)  Discharge follow-up appointment scheduled within 14 calendar days? : No (Calling surgeon today)         Post-op (Clinicians Only)  Did the patient have surgery or a procedure: Yes (Patient calling brianon today.  Writer unable to assess as patient was on another Virtual Visit with her Mental Health " Provider.)     Spouse stating that Home Care is not scheduled to start until 1/6/21.  He is returning to work as a  that week.  He is currently home and able to assist at this time.    He stated they do not have the hospital bed and were told that the DME company sent paperwork to Dr. Torres to complete.  Will update Dr. Torres.        PLAN                                                      Outpatient Plan:  Patient to outreach to Surgeon today.  Awaiting paperwork from PCP for hospital bed.  Home care to start 1/6/21.    Future Appointments   Date Time Provider Department Center   12/30/2021  2:00 PM B Mary Kay Weir [facility] [place]   1/4/2022  3:00 PM B Mary Kay Weir Jo [facility] [place]   1/7/2022 11:00 AM B Mary Kay Weir [facility] [place]   1/10/2022 11:30 AM B Mary Kay Weir [facility] [place]   1/11/2022  1:45 PM Tova Pablo MD [facility]   1/14/2022 11:00 AM B Mary Kay Weir [facility] [place]   1/18/2022  2:00 PM B Mary Kay Weir [facility] [place]   1/21/2022 11:00 AM B Mary Kay Weir [facility] [place]   1/25/2022  2:00 PM B Mary aKy Weir [facility] [place]   1/28/2022 11:00 AM B Mary Kay Weir [facility] [place]   2/1/2022  8:30 AM B Mary Kay Weir [facility] [place]   2/3/2022  2:00 PM B Mary Kay Weir [facility] [place]   2/8/2022  8:30 AM B Mary Kay Weir [facility] [place]   2/10/2022  2:00 PM B Mary Kay Weir [facility] [place]   2/15/2022  8:30 AM B Mary Kay Weir [facility] [place]   2/17/2022  2:00 PM B Mary Kay Weir [facility] [place]   2/22/2022  8:30 AM Mary Kay Lan [facility] [place]   4/4/2022  2:00 PM Alee Coker MD [facility]         For any urgent concerns, please contact our 24 hour nurse triage line: [phone] ([phone])         Sofia Higgins RN

## 2021-12-28 NOTE — PROGRESS NOTES
Progress Note    Patient Name: Randi Cleary  Date: 12/28/21         Service Type: Individual      Session Start Time: 9:30 AM  Session End Time: 10:20 AM     Session Length: 50 minutes     Session #: 74    Attendees: Client attended alone    Service Modality:  Video Visit:    Telemedicine Visit: The patient's condition can be safely assessed and treated via synchronous audio and visual telemedicine encounter.      Reason for Telemedicine Visit: Services only offered telehealth    Originating Site (Patient Location): Patient's home    Distant Site (Provider Location): Provider Remote Setting- Home Office    Consent:  The patient/guardian has verbally consented to: the potential risks and benefits of telemedicine (video visit) versus in person care; bill my insurance or make self-payment for services provided; and responsibility for payment of non-covered services.     Mode of Communication:  Video Conference via [employer]    As the provider I attest to compliance with applicable laws and regulations related to telemedicine.      Treatment Plan Last Reviewed: 12/10/21  PHQ-9 / CHAVO-7 :   PHQ 12/2/2021 12/6/2021 12/6/2021   PHQ-9 Total Score 13 18 18   Q9: Thoughts of better off dead/self-harm past 2 weeks Not at all More than half the days More than half the days   F/U: Thoughts of suicide or self-harm - Yes Yes   F/U: Self harm-plan - Yes Yes   F/U: Self-harm action - Yes Yes   F/U: Safety concerns - Yes Yes   Some encounter information is confidential and restricted. Go to Review Flowsheets activity to see all data.     CHAVO-7 SCORE 12/2/2021 12/14/2021 12/14/2021   Total Score 15 (severe anxiety) - 14 (moderate anxiety)   Total Score 15 14 14   Some encounter information is confidential and restricted. Go to Review Flowsheets activity to see all data.         DATA  Extended Session (53+ minutes): No  Interactive Complexity: No  Crisis: No      Progress Since Last Session  (Related to Symptoms / Goals / Homework):   Symptoms: patient is overwhelmed by pain     Homework: Partially completed     Episode of Care Goals: Minimal progress - ACTION (Actively working towards change); Intervened by reinforcing change plan / affirming steps taken     Current / Ongoing Stressors and Concerns:   Patient is currently socially isolated. She has a conflictual relationship with her .  She is getting minimal physical activity.  She had recent eye surgery     Treatment Objective(s) Addressed in This Session:   Patient will increase frequency of engaging her in ADLs.  Patient will track and record at least 5 pleasant exchanges with . Patient will be able to identify at least 5 positive traits about her .  Patient will reduce level of depressive and anxious features as evidenced by reduction in score on her CHAVO-7 and PHQ-9 (scores of 15 and 16 at first measurment, respectively).     Intervention:   Supportive Therapy: Talked about patient's frustrations with being discharged from the hospital with feeling she didn't get adequate preparation for the transition home. Patient expressed frustrations while writer helped patient redirect this into her actions. Helped her identify actionable steps.    ASSESSMENT: Current Emotional / Mental Status (status of significant symptoms):   Risk status (Self / Other harm or suicidal ideation)   Patient denies current fears or concerns for personal safety.   Patient denies current or recent suicidal ideation or behaviors.    Patient denies current or recent homicidal ideation or behaviors.   Patient denies current or recent self injurious behavior or ideation.   Patient denies other safety concerns.   Patient reports there has been no change in risk factors since their last session.     Patient reports there has been no change in protective factors since their last session.     A safety and risk management plan has been developed including: Patient  consented to co-developed safety plan.  Safety and risk management plan was completed.  Patient agreed to use safety plan should any safety concerns arise.  A copy was given to the patient. This was done on 11/24/2020 and is attached to the bottom of the note.     Appearance:   Appropriate    Eye Contact:   Fair    Psychomotor Behavior: Normal    Attitude:   Cooperative    Orientation:   All   Speech    Rate / Production: Normal/ Responsive    Volume:  Normal    Mood:    Anxious    Affect:    Blunted    Thought Content:  Rumination    Thought Form:  Coherent    Insight:    Fair      Medication Review:   No changes to current psychiatric medication(s)      Medication Compliance:   Yes     Changes in Health Issues:   Yes: had surgery, now is in a lot of pain     Chemical Use Review:   Substance Use: decrease in use.  Patient reports frequency of use none since the third week of August.  Provided encouragement towards sobriety        Tobacco Use: No current tobacco use.      Diagnosis:  1. Bipolar 2 disorder (H)    2. CHAVO (generalized anxiety disorder)        Collateral Reports Completed:   order placed for psychological testing    PLAN: (Patient Tasks / Therapist Tasks / Other)  Call Dr. Gregory with a list of questions you have, including:   -neck brace concerns    -pain concerns   -can you turn your head    Distract yourself from the pain by: sleeping, reading, listening to music, watch movies, talking, focus on pain free areas of your body, break up difficult tasks, pet Slyvester      Next session- talk about her friend (Aleida) not masking when she goes out and how to deal with this.          MICAELA SLADE    December 28, 2021                                                         ______________________________________________________________________    Treatment Plan    Patient's Name: Randi Cleary  YOB: 1955    Date: 12/10/21    DSM5 Diagnoses: 296.32 (F33.1) Major Depressive Disorder,  "Recurrent Episode, Moderate With anxious distress  Psychosocial / Contextual Factors: Patient's entire family of origin has , she now has a sister-in-law and  as support.  Relationship with  is conflictual.  Patient is reporting increase in physical symptoms due to COVID-19.  Patient is off of pain medications.  WHODAS: 42    Referral / Collaboration:  Referral to another professional/service is not indicated at this time.     Anticipated number of session or this episode of care: 12-15      MeasurableTreatment Goal(s) related to diagnosis / functional impairment(s)  Goal 1: Patient will \"jumpstart, getting going with the things I need to be doing around the house as far as picking up, doing things, trying to do something every day.  Also to lessen the animosity between me and my .\"    I will know I've met my goal when my shoulders are fixed and I can see.      Objective #A (Patient Action)    Patient will increase frequency of engaging her in ADLs.  Status: Continued - Date(s): 12/10/21    Intervention(s)  Therapist will engage patient in CBT, specifically behavioral activation.    Objective #B  Patient will track and record at least 5 pleasant exchanges with . Patient will be able to identify at least 5 positive traits about her  and how he relates to her.  Status: Continued - Date(s): 12/10/21    Intervention(s)  Therapist will teach assertiveness skills and assign homework related to relationship interactions.    Objective #C  Patient will reduce level of depressive and anxious features as evidenced by reduction in score on her CHAVO-7 and PHQ-9 (scores of 15 and 16 at first measurment, respectively).  Status: Continued - Date(s):  12/10/21    Intervention(s)  Therapist will engage patient in person-centered therapy and CBT.    Patient has reviewed and agreed to the above plan.      MICAELA SLADE  December 10, 2021                                                Randi RIVERA" "Cathy Cleary          SAFETY PLAN:  Step 1: Warning signs / cues (Thoughts, images, mood, situation, behavior) that a crisis may be developing:  ? Thoughts: \"I don't want to continue\" \"I am unwanted\"  ? Images: none  ? Thinking Processes: ruminating  ? Mood: anger  ? Behaviors: isolating/withdrawing , can't stop crying, not taking care of myself and not taking care of my responsibilities  ? Situations: small triggers, such as not being able to find something, or dropping something   Step 2: Coping strategies - Things I can do to take my mind off of my problems without contacting another person (relaxation technique, physical activity):  ? Distress Tolerance Strategies:  arts and crafts: drawing, play with my pet , listen to positive and upbeat music: any, change body temperature (ice pack/cold water)  and paced breathing/progressive muscle relaxation  ? Physical Activities: go for a walk, deep breathing and stretching   ? Focus on helpful thoughts:  \"You've been through this before, you can get through it again.\"  Step 3: People and social settings that provide distraction:                 Name: Carmen                            Name: Darien                           Name: Aleida       ? pool, shopping, Carmen's house, [employer]       Step 4: Remind myself of people and things that are important to me and worth living for:  Clifford Little Donna, post-COVID world, options of what could be in your future        Step 5: When I am in crisis, I can ask these people to help me use my safety plan:                 Name: Sidney  Step 6: Making the environment safe:   ? go to sleep/daydream  Step 7: Professionals or agencies I can contact during a crisis:  ? [facility] Daytime Number: [phone]  ? Suicide Prevention Lifeline: 3-867-313-TALK (1278)  ? Crisis Text Line Service (available 24 hours a day, 7 days a week): Text [place] to 770982    Local Crisis Services: [facility] Crisis: [phone]     Call 911 or go " to my nearest emergency department.       I helped develop this safety plan and agree to use it when needed.  I have been given a copy of this plan.       Client signature _________________________________________________________________  Today s date:  11/24/2020  Adapted from Safety Plan Template 2008 Randi Pooel and Robby Barba is reprinted with the express permission of the authors.  No portion of the Safety Plan Template may be reproduced without the express, written permission.  You can contact the authors at [email or web address] or [email or web address].

## 2021-12-28 NOTE — PATIENT INSTRUCTIONS
Call Dr. Gregory with a list of questions you have, including:   -neck brace concerns    -pain concerns   -can you turn your head    Distract yourself from the pain by: sleeping, reading, listening to music, watch movies, talking, focus on pain free areas of your body, break up difficult tasks, pet Slyvester

## 2021-12-28 NOTE — TELEPHONE ENCOUNTER
"Called and spoke with Winnie at length, answered multiple questions, advised to come into clinic tomorrow - she declined stating she has \"multiple appointment to go to\". She agreed to come into clinic on Friday 12/31/2021 at 1 PM.  Nalini Cohen RN, CNRN    "

## 2021-12-28 NOTE — TELEPHONE ENCOUNTER
PATIENT NAME:  Randi Cleary  YOB: 1955  MRN: [id number]  SURGEON: Dr. Gregory  DATE of SURGERY: 12/21/2021  PROCEDURE: open door laminoplasty C3-C6    FOLLOW-UP:  Wound Check: 12/31/2021  Staples/Sutures Out: TBD  Post Op Visit: 6 weeks  Post-op Provider: RAKAN on Dr. Gregory clinic day  DIAGNOSTICS: XR  DISPOSITION: Home 12/25/2021    ADDITIONAL INSTRUCTIONS FOR MEDICAL STAFF:        Nalini Cohen RN, CNRN

## 2021-12-29 ENCOUNTER — TELEPHONE (OUTPATIENT)
Dept: FAMILY MEDICINE | Facility: CLINIC | Age: 66
End: 2021-12-29
Payer: MEDICARE

## 2021-12-29 NOTE — DISCHARGE SUMMARY
HOSPITAL DISCHARGE SUMMARY         Patient Name: Randi Cleary  YOB: 1955  Age: 66 year old  Medical Record Number: [id number]  Primary Physician: Cari Torres  Admission Date: 12/21/2021.  Discharge Date:12/25/2021      Randi Cleary will be discharged from [facility] to home    PRINCIPAL DIAGNOSIS CAUSING ADMISSION: <principal problem not specified>    Discharge Diagnoses:  Active Problems:    Hemochromatosis, unspecified hemochromatosis type    Pulmonary hypertension (H)    Postablative hypothyroidism    KYAW (obstructive sleep apnea)    COPD (chronic obstructive pulmonary disease) (H)    Bipolar 2 disorder (H)    Cord compression (H)    History of cervical spinal surgery    Cervical radiculopathy      HPI: from previous consultation -  64 yo female with recent history of right CTR who presents with neck and shoulder pain, arm numbness, hand weakness, fine motor difficulties and imbalance. MRI of her cervical spine sows multi-level cervical stenosis including moderate at C4-5 and moderate to severe at C6-7 and mild spinal canal stenosis at C5-6 with multi-level severe foraminal narrowing at these levels.. Also has retrolisthesis of C4-5 and anterolisthesis of C7-T1. XR does not show any significant instability. We discussed in detail the risks and benefits of laminoplasty versus posterior decompression and fusion. Patient would like to avoid a fusion.     BRIEF HOSPITAL COURSE: Randi Cleary is a 66 year old female who was admitted on day of surgery and underwent Procedure(s):  CERVICAL 3-CERVICAL 6 LEFT OPEN DOOR LAMINOPLASTY AND LEFT CERVICAL 4-5 AND CERVICAL 6-7 POSTERIOR FORAMINOTOMY.  Surgery was without complications.  Postoperatively she reported some improvement in pre-op left arm symptoms, expected post-op pain.   On exam on day of discharge, her strength was 5/5 in bilateral upper extremities with no new focal deficits.  PT/OT consultations were obtained to  assess function, assist with mobilization, and evaluate for discharge recommendations.  By POD # 4 she was tolerating a regular diet, ambulating, voiding without difficulty, and obtaining good pain control on oral medication.  Her incision was clean, dry and intact.   She met all discharge criteria and was stable for discharge. Discharge medications were sent to the pharmacy, shivam will have close interval follow up - she will need a lot of support in the post-op period.       PROCEDURES PERFORMED DURING HOSPITALIZATION: Procedure/Surgery Information   Procedure: Procedure(s):  CERVICAL 3-CERVICAL 6 LEFT OPEN DOOR LAMINOPLASTY AND LEFT CERVICAL 4-5 AND CERVICAL 6-7 POSTERIOR FORAMINOTOMY   Surgeon(s): Surgeon(s) and Role:     * Angela Gregory MD - Primary     * Kassandra Tobin NP - Assisting             COMPLICATIONS IN HOSPITAL:  None.    IMPORTANT PENDING TEST RESULTS: None.    PERTINENT FINDINGS/RESULTS AT DISCHARGE:  None.    CONDITION AT DISCHARGE:  improving    DISCHARGE MEDICATIONS  No current facility-administered medications for this encounter.    Current Outpatient Medications:      acetaminophen (TYLENOL) 325 MG tablet, Take 2 tablets (650 mg) by mouth every 4 hours as needed for other (For optimal non-opioid multimodal pain management to improve pain control.), Disp: , Rfl:      albuterol (PROVENTIL) (2.5 MG/3ML) 0.083% neb solution, Take 1 vial (2.5 mg) by nebulization every 4 hours as needed for shortness of breath / dyspnea or wheezing, Disp: 360 mL, Rfl: 5     albuterol (VENTOLIN HFA) 108 (90 Base) MCG/ACT inhaler, Inhale 2 puffs into the lungs every 6 hours, Disp: 18 g, Rfl: 11     benzocaine-menthol (CEPACOL) 15-3.6 MG lozenge, Place 1 lozenge inside cheek every hour as needed for sore throat, Disp: , Rfl:      Cholecalciferol (VITAMIN D3) 250 MCG (10394 UT) TABS, Take 1 tablet by mouth daily 93825/day, Disp: , Rfl:      Cyanocobalamin (VITAMIN B-12) 5000 MCG SUBL, Place 2-3 sprays under  the tongue daily Unknown dose. 2 or 3 sprays/day, Disp: , Rfl:      ethacrynic acid (EDECRIN) 25 MG tablet, Take 1 tablet by mouth on Saturday and Wednesday, Disp: 30 tablet, Rfl: 11     Fluticasone-Umeclidin-Vilanterol (TRELEGY ELLIPTA) 200-62.5-25 MCG/INH oral inhaler, Inhale 1 puff into the lungs daily, Disp: 1 each, Rfl: 11     losartan (COZAAR) 25 MG tablet, Take 1 tablet (25 mg) by mouth daily, Disp: 90 tablet, Rfl: 2     magnesium 250 MG tablet, Take 1 tablet by mouth 2 times daily, Disp: , Rfl:      Multiple Vitamins-Iron (MULTIVITAMIN PLUS IRON ADULT) TABS, Take 4 tablets by mouth daily, Disp: 120 tablet, Rfl: 0     omeprazole (PRILOSEC) 20 MG DR capsule, Take 20 mg by mouth daily , Disp: , Rfl:      OXcarbazepine (TRILEPTAL) 150 MG tablet, One-half tab bedtime 3 nights, one-half tab twice a day 5 days, one twice a day (Patient taking differently: Take 75 mg by mouth daily ), Disp: 30 tablet, Rfl: 3     polyethylene glycol (MIRALAX) 17 g packet, Take 17 g by mouth daily, Disp: , Rfl:      potassium chloride ER (KLOR-CON M) 20 MEQ CR tablet, Take 1 tablet (20 mEq) by mouth daily, Disp: 90 tablet, Rfl: 3     PREBIOTIC PRODUCT PO, Take by mouth daily , Disp: , Rfl:      rOPINIRole (REQUIP) 2 MG tablet, Take 2-4 mg by mouth At Bedtime , Disp: , Rfl:      senna-docusate (SENOKOT-S/PERICOLACE) 8.6-50 MG tablet, Take 1 tablet by mouth 2 times daily as needed for constipation, Disp: , Rfl:      SYNTHROID 150 MCG tablet, Take 1 tablet (150 mcg) by mouth daily, Disp: 90 tablet, Rfl: 4     Turmeric Curcumin 500 MG CAPS, Take 500 mg by mouth daily , Disp: , Rfl:      vitamin (B COMPLEX-C) tablet, Take 1 tablet by mouth daily, Disp: , Rfl:      vitamin E 400 units TABS, Take 800 Units by mouth daily, Disp: , Rfl:      HYDROcodone-acetaminophen (NORCO) 5-325 MG tablet, Take 1-2 tablets by mouth every 4 hours as needed for moderate to severe pain, Disp: 45 tablet, Rfl: 0     hydrOXYzine (ATARAX) 25 MG tablet, Take 1  tablet (25 mg) by mouth every 6 hours as needed for anxiety, Disp: 30 tablet, Rfl: 0     Lidocaine (LIDOCARE) 4 % Patch, Place 2 patches onto the skin every 24 hours To prevent lidocaine toxicity, patient should be patch free for 12 hrs daily., Disp: 15 patch, Rfl: 0     medical cannabis (Patient's own supply), See Admin Instructions (The purpose of this order is to document that the patient reports taking medical cannabis.  This is not a prescription, and is not used to certify that the patient has a qualifying medical condition.), Disp: , Rfl:      methocarbamol (ROBAXIN) 500 MG tablet, Take 1 tablet (500 mg) by mouth 4 times daily May take two tabs at bedtime, Disp: 40 tablet, Rfl: 0       AFTER DISCHARGE ORDERS AND INSTRUCTIONS:     Follow up with Neurosurgery: in= two weeks for wound check  Follow up appointment with Primary Care Physician: Cari Torres as needed  Diet: Orders Placed This Encounter      Discharge Instruction - Regular Diet Adult     Activity: *No lifting, pushing or pulling greater than 5-10 pound (this is about a gallon of milk).  *No repetitive bending, twisting, or jarring activities  *No overhead work  *No aerobic or strenuous activity  *No activities with increased risk of falls  *You may move about your home as tolerated  *You may walk up and down stairs as tolerated  *You may increase your activity slowly over the next 4-6 weeks    Warnings: Call [phone] with the following symptoms:    *Temperature 101(fever) or greater or chills  *Redness, swelling or increased drainage from the wound  *Worsening pain not relieved by the pain prescription given  *Worsening or new onset of weakness, or numbness and tingling  *Loss or change in your ability to control bowel or bladder function  *Change in your ability to walk, talk, see or think  *If you did not have a bowel movement before leaving the hospital and your bowels have not moved within 48 hours of your discharge    Wound care:  "WOUND CARE WITH STAPLES:  * Staples are usually removed in 1-3 weeks.  They are sometimes left longer.  * Keep a dressing on the wound until the staples are removed. You may use an extra large band-aid or 4x4 and tape.  Both can be purchased at your pharmacy.  * No lotions, soaps, powders, ointments, creams, or patches on incision until the wound   is well healed.  The incision does not need to be \"cleaned\" with soap and water.    * * Change the dressing at least daily, more frequently if necessary to keep the wound clean and dry.      Kassandra Tobin NP  [facility]   [address] [place] [address] [address]  [place] [address]  Office: [phone]    "

## 2021-12-29 NOTE — TELEPHONE ENCOUNTER
Reason for Call:  Home Health Care    [facility] called regarding verbal orders [phone]    Delayed start of care orders for 12/30/2021    Please confirm that Dr. Torres will be following patient in homecare    Phone Number Homecare Nurse can be reached at: [phone]    Can we leave a detailed message on this number? YES      Call taken on 12/29/2021 at 3:55 PM by Leah Pineda

## 2021-12-30 ENCOUNTER — VIRTUAL VISIT (OUTPATIENT)
Dept: PSYCHOLOGY | Facility: CLINIC | Age: 66
End: 2021-12-30
Payer: MEDICARE

## 2021-12-30 ENCOUNTER — TELEPHONE (OUTPATIENT)
Dept: NEUROSURGERY | Facility: CLINIC | Age: 66
End: 2021-12-30
Payer: MEDICARE

## 2021-12-30 DIAGNOSIS — G95.20 CORD COMPRESSION (H): Chronic | ICD-10-CM

## 2021-12-30 DIAGNOSIS — F41.1 GAD (GENERALIZED ANXIETY DISORDER): ICD-10-CM

## 2021-12-30 DIAGNOSIS — F31.81 BIPOLAR 2 DISORDER (H): Primary | ICD-10-CM

## 2021-12-30 PROCEDURE — 90834 PSYTX W PT 45 MINUTES: CPT | Mod: 95 | Performed by: SOCIAL WORKER

## 2021-12-30 RX ORDER — HYDROCODONE BITARTRATE AND ACETAMINOPHEN 5; 325 MG/1; MG/1
1-2 TABLET ORAL EVERY 4 HOURS PRN
Qty: 45 TABLET | Refills: 0 | Status: SHIPPED | OUTPATIENT
Start: 2021-12-30 | End: 2022-01-05

## 2021-12-30 NOTE — PROGRESS NOTES
Progress Note    Patient Name: Randi Cleary  Date: 12/30/21         Service Type: Individual      Session Start Time: 2:02 PM  Session End Time: 2:51 PM     Session Length: 49 minutes     Session #: 75    Attendees: Client attended alone    Service Modality:  Video Visit:    Telemedicine Visit: The patient's condition can be safely assessed and treated via synchronous audio and visual telemedicine encounter.      Reason for Telemedicine Visit: Services only offered telehealth    Originating Site (Patient Location): Patient's home    Distant Site (Provider Location): Provider Remote Setting- Home Office    Consent:  The patient/guardian has verbally consented to: the potential risks and benefits of telemedicine (video visit) versus in person care; bill my insurance or make self-payment for services provided; and responsibility for payment of non-covered services.     Mode of Communication:  Video Conference via [employer]    As the provider I attest to compliance with applicable laws and regulations related to telemedicine.      Treatment Plan Last Reviewed: 12/10/21  PHQ-9 / CHAVO-7 :   PHQ 12/2/2021 12/6/2021 12/6/2021   PHQ-9 Total Score 13 18 18   Q9: Thoughts of better off dead/self-harm past 2 weeks Not at all More than half the days More than half the days   F/U: Thoughts of suicide or self-harm - Yes Yes   F/U: Self harm-plan - Yes Yes   F/U: Self-harm action - Yes Yes   F/U: Safety concerns - Yes Yes   Some encounter information is confidential and restricted. Go to Review Flowsheets activity to see all data.     CHAVO-7 SCORE 12/2/2021 12/14/2021 12/14/2021   Total Score 15 (severe anxiety) - 14 (moderate anxiety)   Total Score 15 14 14   Some encounter information is confidential and restricted. Go to Review Flowsheets activity to see all data.         DATA  Extended Session (53+ minutes): No  Interactive Complexity: No  Crisis: No      Progress Since Last Session  (Related to Symptoms / Goals / Homework):   Symptoms: patient is at a point of low hope     Homework: Achieved / completed to satisfaction   Call Dr. Gregory with a list of questions you have, including: -done   -neck brace concerns    -pain concerns   -can you turn your head    Distract yourself from the pain by: sleeping, reading, listening to music, watch movies, talking, focus on pain free areas of your body, break up difficult tasks, pet Slyvester -done some       Episode of Care Goals: Minimal progress - ACTION (Actively working towards change); Intervened by reinforcing change plan / affirming steps taken     Current / Ongoing Stressors and Concerns:   Patient is currently socially isolated. She has a conflictual relationship with her .  She is getting minimal physical activity.  She had recent eye surgery     Treatment Objective(s) Addressed in This Session:   Patient will increase frequency of engaging her in ADLs.  Patient will track and record at least 5 pleasant exchanges with . Patient will be able to identify at least 5 positive traits about her .  Patient will reduce level of depressive and anxious features as evidenced by reduction in score on her CHAVO-7 and PHQ-9 (scores of 15 and 16 at first measurment, respectively).     Intervention:   Supportive Therapy: Discussed challenges with health and still not having a hospital bed. Talked about other challenges, and helped patient refocus on what was in her control. Looked at distractions from pain. Discussed relationship with a good friend and concerns she has that her friend hasn't checked in on her lately, talked about direct communication with this friend about this concern.    ASSESSMENT: Current Emotional / Mental Status (status of significant symptoms):   Risk status (Self / Other harm or suicidal ideation)   Patient denies current fears or concerns for personal safety.   Patient denies current or recent suicidal ideation or  behaviors.    Patient denies current or recent homicidal ideation or behaviors.   Patient denies current or recent self injurious behavior or ideation.   Patient denies other safety concerns.   Patient reports there has been no change in risk factors since their last session.     Patient reports there has been no change in protective factors since their last session.     A safety and risk management plan has been developed including: Patient consented to co-developed safety plan.  Safety and risk management plan was completed.  Patient agreed to use safety plan should any safety concerns arise.  A copy was given to the patient. This was done on 11/24/2020 and is attached to the bottom of the note.     Appearance:   Appropriate    Eye Contact:   Fair    Psychomotor Behavior: Normal    Attitude:   Cooperative    Orientation:   All   Speech    Rate / Production: Normal/ Responsive    Volume:  Normal    Mood:    Anxious    Affect:    Blunted    Thought Content:  Rumination    Thought Form:  Coherent    Insight:    Fair      Medication Review:   No changes to current psychiatric medication(s)      Medication Compliance:   Yes     Changes in Health Issues:   None reported     Chemical Use Review:   Substance Use: decrease in use.  Patient reports frequency of use none since the third week of August.  Provided encouragement towards sobriety        Tobacco Use: No current tobacco use.      Diagnosis:  1. Bipolar 2 disorder (H)    2. CHAVO (generalized anxiety disorder)        Collateral Reports Completed:   Not Applicable    PLAN: (Patient Tasks / Therapist Tasks / Other)  Talk to friend directly    Distract yourself from the pain by: sleeping, reading, listening to music, watch movies, talking, focus on pain free areas of your body, break up difficult tasks, pet Slyvester      Next session- talk about her friend (Aleida) not masking when she goes out and how to deal with this.          MICAELA SLADE    December 30,  "                                                         ______________________________________________________________________    Treatment Plan    Patient's Name: Randi Cleary  YOB: 1955    Date: 12/10/21    DSM5 Diagnoses: 296.32 (F33.1) Major Depressive Disorder, Recurrent Episode, Moderate With anxious distress  Psychosocial / Contextual Factors: Patient's entire family of origin has , she now has a sister-in-law and  as support.  Relationship with  is conflictual.  Patient is reporting increase in physical symptoms due to COVID-19.  Patient is off of pain medications.  WHODAS: 42    Referral / Collaboration:  Referral to another professional/service is not indicated at this time.     Anticipated number of session or this episode of care: 12-15      MeasurableTreatment Goal(s) related to diagnosis / functional impairment(s)  Goal 1: Patient will \"jumpstart, getting going with the things I need to be doing around the house as far as picking up, doing things, trying to do something every day.  Also to lessen the animosity between me and my .\"    I will know I've met my goal when my shoulders are fixed and I can see.      Objective #A (Patient Action)    Patient will increase frequency of engaging her in ADLs.  Status: Continued - Date(s): 12/10/21    Intervention(s)  Therapist will engage patient in CBT, specifically behavioral activation.    Objective #B  Patient will track and record at least 5 pleasant exchanges with . Patient will be able to identify at least 5 positive traits about her  and how he relates to her.  Status: Continued - Date(s): 12/10/21    Intervention(s)  Therapist will teach assertiveness skills and assign homework related to relationship interactions.    Objective #C  Patient will reduce level of depressive and anxious features as evidenced by reduction in score on her CHAVO-7 and PHQ-9 (scores of 15 and 16 at first measurment, " "respectively).  Status: Continued - Date(s):  12/10/21    Intervention(s)  Therapist will engage patient in person-centered therapy and CBT.    Patient has reviewed and agreed to the above plan.      MICAELA SLADE  December 10, 2021                                                Randi Cleary          SAFETY PLAN:  Step 1: Warning signs / cues (Thoughts, images, mood, situation, behavior) that a crisis may be developing:  ? Thoughts: \"I don't want to continue\" \"I am unwanted\"  ? Images: none  ? Thinking Processes: ruminating  ? Mood: anger  ? Behaviors: isolating/withdrawing , can't stop crying, not taking care of myself and not taking care of my responsibilities  ? Situations: small triggers, such as not being able to find something, or dropping something   Step 2: Coping strategies - Things I can do to take my mind off of my problems without contacting another person (relaxation technique, physical activity):  ? Distress Tolerance Strategies:  arts and crafts: drawing, play with my pet , listen to positive and upbeat music: any, change body temperature (ice pack/cold water)  and paced breathing/progressive muscle relaxation  ? Physical Activities: go for a walk, deep breathing and stretching   ? Focus on helpful thoughts:  \"You've been through this before, you can get through it again.\"  Step 3: People and social settings that provide distraction:                 Name: Carmen                            Name: Darien                           Name: Aleida       ? pool, shopping, Carmen's house, [employer]       Step 4: Remind myself of people and things that are important to me and worth living for:  Clifford Little Donna, post-COVID world, options of what could be in your future        Step 5: When I am in crisis, I can ask these people to help me use my safety plan:                 Name: Sidney  Step 6: Making the environment safe:   ? go to sleep/daydream  Step 7: Professionals or agencies I can contact during a " crisis:  ? [facility] Number: [phone]  ? Suicide Prevention Lifeline: 4-433-928-IDSE (6931)  ? Crisis Text Line Service (available 24 hours a day, 7 days a week): Text [place] to 683749    Local Crisis Services: [facility]: [phone]     Call 911 or go to my nearest emergency department.       I helped develop this safety plan and agree to use it when needed.  I have been given a copy of this plan.       Client signature _________________________________________________________________  Today s date:  11/24/2020  Adapted from Safety Plan Template 2008 Randi Poole and Robby Barab is reprinted with the express permission of the authors.  No portion of the Safety Plan Template may be reproduced without the express, written permission.  You can contact the authors at [email or web address] or [email or web address].

## 2021-12-30 NOTE — TELEPHONE ENCOUNTER
Patient would like a refill of her pain medication. Patient has 4 pills left    Please send to [facility] in [place]. Phone#[phone].     You can call patient at [phone].

## 2021-12-31 ENCOUNTER — TELEPHONE (OUTPATIENT)
Dept: NEUROSURGERY | Facility: CLINIC | Age: 66
End: 2021-12-31

## 2021-12-31 ENCOUNTER — TELEPHONE (OUTPATIENT)
Dept: FAMILY MEDICINE | Facility: CLINIC | Age: 66
End: 2021-12-31

## 2021-12-31 ENCOUNTER — MEDICAL CORRESPONDENCE (OUTPATIENT)
Dept: HEALTH INFORMATION MANAGEMENT | Facility: CLINIC | Age: 66
End: 2021-12-31

## 2021-12-31 ENCOUNTER — ALLIED HEALTH/NURSE VISIT (OUTPATIENT)
Dept: NEUROSURGERY | Facility: CLINIC | Age: 66
End: 2021-12-31
Payer: MEDICARE

## 2021-12-31 VITALS
HEART RATE: 85 BPM | RESPIRATION RATE: 18 BRPM | OXYGEN SATURATION: 93 % | DIASTOLIC BLOOD PRESSURE: 71 MMHG | SYSTOLIC BLOOD PRESSURE: 127 MMHG

## 2021-12-31 DIAGNOSIS — M54.12 CERVICAL RADICULOPATHY: Primary | ICD-10-CM

## 2021-12-31 DIAGNOSIS — M47.12 CERVICAL SPONDYLOSIS WITH MYELOPATHY: Primary | ICD-10-CM

## 2021-12-31 PROCEDURE — 99207 PR NO CHARGE NURSE ONLY: CPT

## 2021-12-31 RX ORDER — HYDROXYZINE HYDROCHLORIDE 25 MG/1
25 TABLET, FILM COATED ORAL 3 TIMES DAILY PRN
Qty: 20 TABLET | Refills: 0 | Status: SHIPPED | OUTPATIENT
Start: 2021-12-31 | End: 2022-01-20

## 2021-12-31 NOTE — TELEPHONE ENCOUNTER
Reason for Call:  Home Health Care    [facility], contact name See.    Verbal orders are needed.    PT: 1 time/week for 1 week, 2 times/week  for 1 week, 1 time/week for 3 weeks, 2 prn    OT: Evaluation and treat    Skilled Nursing: Home health aid 2 times/week for 3 weeks, 1 time/week for 2 weeks, 2 prn    Phone Number Homecare Nurse can be reached at: [phone] ext [phone]     Can we leave a detailed message on this number? YES    Phone number patient can be reached at: [phone]      Call taken on 12/31/2021 at 10:50 AM by Leah Pineda

## 2021-12-31 NOTE — PROGRESS NOTES
Patient presents today for post-op incision check.     DOS: 12/21/2021  Procedure: CERVICAL 3-CERVICAL 6 LEFT OPEN DOOR LAMINOPLASTY AND LEFT CERVICAL 4-5 AND CERVICAL 6-7 POSTERIOR FORAMINOTOMY  Surgeon: Colt    Pt is 10 days post op, and here today for a wound check. Is accompanied by her . She presented preoperatively with neck pain, bilateral shoulder pain, LUE pain with numbness and tingling in her hands. Today, she reports much improved left arm pain and much improved left arm and hand numbness. Her RUE is painful, but manageable with medication. Pt uses a cane for stability. She is wearing her [place] J collar and is compliant with its use at home. Taking 2 tabs of Norco 5-325 every 4 hours for pain, hydroxyzine every 6 hours, robaxin QID, and tylenol TID for pain with adequate relief. Pt is using ice/heat for muscle stiffness/soreness.    Patient is walking frequently without difficulty. Activity restrictions reinforced at this time as outlined the After Visit Summary.     Surgical wound WNL - C/D/I, no signs of infection or skin breakdown.  Incision well-healed: good skin approximation, no redness or visible/palpable edema, no tenderness to palpation.  Pt denies fever, chills or sweats.    Staples - left in place, to be removed on 01/05/2022.     Refills given at this appointment? A one time refill of hydroxyzine was provided. Pt will need to go to her PCP for this going forward.     All of patient's questions addressed today. She was instructed to call with any additional questions/concerns. Follow up appts made.     Sowmya Oshea RN

## 2021-12-31 NOTE — PATIENT INSTRUCTIONS
WOUND CARE:  Keep a dressing on the wound until the staples are removed.  No lotions, soaps, powders, ointments, creams, or patches on incision until the wound is well healed.    Change the dressing daily, more frequently if necessary to keep the wound dry.  If you notice increased or persistent drainage from your wound, contact our office.  You may use an extra large band-aid or 4x4 and tape.  Both can be purchased at your pharmacy.    Staples are usually removed in 1-2 weeks.  They are sometimes left longer.    Wash your hands before changing the dressing or touching the wound. If someone is helping you change the dressing, ensure that the person washes his/her hands.  Good handwashing can decrease the risk of infection.  If you are unable to see the wound, have someone check the wound daily for redness, swelling or drainage.  A small amount of drainage is normal.       Two days after surgery begin showering. Wash your wound daily. Remove all dressings prior to your shower.  Apply mild soap directly to the wound, form a light lather and rinse with running water.     Do not submerge the wound under water. No baths or swimming for 6 weeks.     If you develop redness, swelling, drainage, or temp 101 or greater, please call our office at [phone].    Activity Restrictions:  *No lifting, pushing or pulling greater than 5-10 pounds (this is about a gallon ofmilk).  *No repetitive bending, twisting, or jarring activities  *No overhead work  *No aerobic or strenuous activity  *No activities with increased risk of falls  *You may move about your home as tolerated  *You may walk up and down stairs as tolerated  *You may increase your activity slowly over the next 4-6 weeks    WALKING PROGRAM: As you can tolerate, walk daily-start with 5-10 minutes of continuous walking. This is in addition to the walking that you do as part of your daily activities. Increase the time that you walk by 5 minutes every couple of days. Do  not exceed 30-45 minutes of continuous walking until seen in follow-up. Walking is the best exercise after surgery.  **Listen to your body, if you find that you are more painful or fatigued, you may need to proceed more slowly.    **Do not smoke or expose yourself to second handsmoke. Cigarette smoke can delay healing and cause complications.     DRIVING:  We recommend that you do not drive while taking medications for pain or muscle spasms. Always read and follow the advice on your prescription bottle. If you have questions, speak with your pharmacist. We recommend that you do not drive while wearing a brace, as it could limit your range of motion.    WORK: If you plan to return to work before your 6 week appointment, call and discuss with one of the nurses in the neurosurgery office.

## 2022-01-04 ENCOUNTER — VIRTUAL VISIT (OUTPATIENT)
Dept: PSYCHOLOGY | Facility: CLINIC | Age: 67
End: 2022-01-04
Payer: MEDICARE

## 2022-01-04 DIAGNOSIS — F41.1 GAD (GENERALIZED ANXIETY DISORDER): ICD-10-CM

## 2022-01-04 DIAGNOSIS — F31.81 BIPOLAR 2 DISORDER (H): Primary | ICD-10-CM

## 2022-01-04 PROCEDURE — 90837 PSYTX W PT 60 MINUTES: CPT | Mod: 95 | Performed by: SOCIAL WORKER

## 2022-01-04 NOTE — PROGRESS NOTES
Progress Note    Patient Name: Randi lCeary  Date: 1/4/22         Service Type: Individual      Session Start Time: 3:04 PM  Session End Time: 3:59 PM     Session Length: 54 minutes     Session #: 76    Attendees: Client attended alone    Service Modality:  Video Visit:    Telemedicine Visit: The patient's condition can be safely assessed and treated via synchronous audio and visual telemedicine encounter.      Reason for Telemedicine Visit: Services only offered telehealth    Originating Site (Patient Location): Patient's home    Distant Site (Provider Location): Provider Remote Setting- Home Office    Consent:  The patient/guardian has verbally consented to: the potential risks and benefits of telemedicine (video visit) versus in person care; bill my insurance or make self-payment for services provided; and responsibility for payment of non-covered services.     Mode of Communication:  Video Conference via [employer]    As the provider I attest to compliance with applicable laws and regulations related to telemedicine.      Treatment Plan Last Reviewed: 12/10/21  PHQ-9 / CHAVO-7 :   PHQ 12/2/2021 12/6/2021 12/6/2021   PHQ-9 Total Score 13 18 18   Q9: Thoughts of better off dead/self-harm past 2 weeks Not at all More than half the days More than half the days   F/U: Thoughts of suicide or self-harm - Yes Yes   F/U: Self harm-plan - Yes Yes   F/U: Self-harm action - Yes Yes   F/U: Safety concerns - Yes Yes   Some encounter information is confidential and restricted. Go to Review Flowsheets activity to see all data.     CHAVO-7 SCORE 12/2/2021 12/14/2021 12/14/2021   Total Score 15 (severe anxiety) - 14 (moderate anxiety)   Total Score 15 14 14   Some encounter information is confidential and restricted. Go to Review Flowsheets activity to see all data.         DATA  Extended Session (53+ minutes): PROLONGED SERVICE IN THE OUTPATIENT SETTING REQUIRING DIRECT (FACE-TO-FACE)  PATIENT CONTACT BEYOND THE USUAL SERVICE:    - Treatment protocol required additional time to complete, due to the nature of diagnosis being treated.  See Interventions section for details  Interactive Complexity: No  Crisis: No      Progress Since Last Session (Related to Symptoms / Goals / Homework):   Symptoms: health is stablizing, but still having a lot of irritablity and anger outburts     Homework: Achieved / completed to satisfaction   Talk to friend directly -done    Distract yourself from the pain by: sleeping, reading, listening to music, watch movies, talking, focus on pain free areas of your body, break up difficult tasks, pet Slyvester     Episode of Care Goals: Minimal progress - ACTION (Actively working towards change); Intervened by reinforcing change plan / affirming steps taken     Current / Ongoing Stressors and Concerns:   Patient is currently socially isolated. She has a conflictual relationship with her .  She is getting minimal physical activity.  She had recent eye surgery     Treatment Objective(s) Addressed in This Session:   Patient will increase frequency of engaging her in ADLs.  Patient will track and record at least 5 pleasant exchanges with . Patient will be able to identify at least 5 positive traits about her .  Patient will reduce level of depressive and anxious features as evidenced by reduction in score on her CHAVO-7 and PHQ-9 (scores of 15 and 16 at first measurment, respectively).     Intervention:   Supportive Therapy: Processed recovery. Discussed peer relations and visiting with a friend, relying on friends while her  was gone. Talked about how to manage peer relationships from here. Discussed how she's been tracking her mood lately and is noticing getting angry easily.    ASSESSMENT: Current Emotional / Mental Status (status of significant symptoms):   Risk status (Self / Other harm or suicidal ideation)   Patient denies current fears or concerns  for personal safety.   Patient denies current or recent suicidal ideation or behaviors.    Patient denies current or recent homicidal ideation or behaviors.   Patient denies current or recent self injurious behavior or ideation.   Patient denies other safety concerns.   Patient reports there has been no change in risk factors since their last session.     Patient reports there has been no change in protective factors since their last session.     A safety and risk management plan has been developed including: Patient consented to co-developed safety plan.  Safety and risk management plan was completed.  Patient agreed to use safety plan should any safety concerns arise.  A copy was given to the patient. This was done on 11/24/2020 and is attached to the bottom of the note.     Appearance:   Appropriate    Eye Contact:   Fair    Psychomotor Behavior: Normal    Attitude:   Cooperative    Orientation:   All   Speech    Rate / Production: Normal/ Responsive    Volume:  Normal    Mood:    Anxious    Affect:    Appropriate    Thought Content:  Clear    Thought Form:  Coherent    Insight:    Fair      Medication Review:   No changes to current psychiatric medication(s)      Medication Compliance:   Yes     Changes in Health Issues:   None reported     Chemical Use Review:   Substance Use: decrease in use.  Patient reports frequency of use none since the third week of August.  Provided encouragement towards sobriety        Tobacco Use: No current tobacco use.      Diagnosis:  1. Bipolar 2 disorder (H)    2. CHAVO (generalized anxiety disorder)        Collateral Reports Completed:   Not Applicable    PLAN: (Patient Tasks / Therapist Tasks / Other)  Follow the restrictions for physical limitations  Be more tolerant of Sidney      Next session- talk about her friend (Aleida) not masking when she goes out and how to deal with this.          MICAELA SLADE    January 4, 2022                                                      "    ______________________________________________________________________    Treatment Plan    Patient's Name: Randi Cleary  YOB: 1955    Date: 12/10/21    DSM5 Diagnoses: 296.32 (F33.1) Major Depressive Disorder, Recurrent Episode, Moderate With anxious distress  Psychosocial / Contextual Factors: Patient's entire family of origin has , she now has a sister-in-law and  as support.  Relationship with  is conflictual.  Patient is reporting increase in physical symptoms due to COVID-19.  Patient is off of pain medications.  WHODAS: 42    Referral / Collaboration:  Referral to another professional/service is not indicated at this time.     Anticipated number of session or this episode of care: 12-15      MeasurableTreatment Goal(s) related to diagnosis / functional impairment(s)  Goal 1: Patient will \"jumpstart, getting going with the things I need to be doing around the house as far as picking up, doing things, trying to do something every day.  Also to lessen the animosity between me and my .\"    I will know I've met my goal when my shoulders are fixed and I can see.      Objective #A (Patient Action)    Patient will increase frequency of engaging her in ADLs.  Status: Continued - Date(s): 12/10/21    Intervention(s)  Therapist will engage patient in CBT, specifically behavioral activation.    Objective #B  Patient will track and record at least 5 pleasant exchanges with . Patient will be able to identify at least 5 positive traits about her  and how he relates to her.  Status: Continued - Date(s): 12/10/21    Intervention(s)  Therapist will teach assertiveness skills and assign homework related to relationship interactions.    Objective #C  Patient will reduce level of depressive and anxious features as evidenced by reduction in score on her CHAVO-7 and PHQ-9 (scores of 15 and 16 at first measurment, respectively).  Status: Continued - Date(s):  " "12/10/21    Intervention(s)  Therapist will engage patient in person-centered therapy and CBT.    Patient has reviewed and agreed to the above plan.      MICAELA SLADE  December 10, 2021                                                Randi Cleary          SAFETY PLAN:  Step 1: Warning signs / cues (Thoughts, images, mood, situation, behavior) that a crisis may be developing:  ? Thoughts: \"I don't want to continue\" \"I am unwanted\"  ? Images: none  ? Thinking Processes: ruminating  ? Mood: anger  ? Behaviors: isolating/withdrawing , can't stop crying, not taking care of myself and not taking care of my responsibilities  ? Situations: small triggers, such as not being able to find something, or dropping something   Step 2: Coping strategies - Things I can do to take my mind off of my problems without contacting another person (relaxation technique, physical activity):  ? Distress Tolerance Strategies:  arts and crafts: drawing, play with my pet , listen to positive and upbeat music: any, change body temperature (ice pack/cold water)  and paced breathing/progressive muscle relaxation  ? Physical Activities: go for a walk, deep breathing and stretching   ? Focus on helpful thoughts:  \"You've been through this before, you can get through it again.\"  Step 3: People and social settings that provide distraction:                 Name: Carmen                            Name: [place]                           Name: Aleida       ? pool, shopping, Carmen's house, [employer]       Step 4: Remind myself of people and things that are important to me and worth living for:  Sidney, Aleida Horton, post-COVID world, options of what could be in your future        Step 5: When I am in crisis, I can ask these people to help me use my safety plan:                 Name: Sidney  Step 6: Making the environment safe:   ? go to sleep/daydream  Step 7: Professionals or agencies I can contact during a crisis:  ? [facility] Day[facility] " Number: 005-790-5258  ? Suicide Prevention Lifeline: 7-861-763-TALK (9372)  ? Crisis Text Line Service (available 24 hours a day, 7 days a week): Text [place] to 840859    Local Crisis Services: [facility]: [phone]     Call 911 or go to my nearest emergency department.       I helped develop this safety plan and agree to use it when needed.  I have been given a copy of this plan.       Client signature _________________________________________________________________  Today s date:  11/24/2020  Adapted from Safety Plan Template 2008 Randi Poole and Robby Barba is reprinted with the express permission of the authors.  No portion of the Safety Plan Template may be reproduced without the express, written permission.  You can contact the authors at bhs@[place].[facility] or [email or web address].

## 2022-01-05 ENCOUNTER — TELEPHONE (OUTPATIENT)
Dept: NEUROSURGERY | Facility: CLINIC | Age: 67
End: 2022-01-05
Payer: MEDICARE

## 2022-01-05 DIAGNOSIS — G95.20 CORD COMPRESSION (H): Chronic | ICD-10-CM

## 2022-01-05 DIAGNOSIS — M54.12 CERVICAL RADICULOPATHY: Primary | ICD-10-CM

## 2022-01-05 RX ORDER — HYDROCODONE BITARTRATE AND ACETAMINOPHEN 5; 325 MG/1; MG/1
1-2 TABLET ORAL EVERY 4 HOURS PRN
Qty: 45 TABLET | Refills: 0 | Status: SHIPPED | OUTPATIENT
Start: 2022-01-05 | End: 2022-01-10

## 2022-01-05 RX ORDER — METHOCARBAMOL 500 MG/1
500 TABLET, FILM COATED ORAL 4 TIMES DAILY PRN
Qty: 40 TABLET | Refills: 0 | Status: SHIPPED | OUTPATIENT
Start: 2022-01-05 | End: 2022-01-14

## 2022-01-05 NOTE — TELEPHONE ENCOUNTER
Winnie calls to inquire about refills of her Norco 5/325 mg (take 2 tabs every 5-6 hours) and Robaxin. Reports noticeable improvement of neck pain and almost resolved pain in her left arm.   She will follow up in clinic tomorrow 01/16/2022 for nelson removal  Nalini Cohen RN, CNRN

## 2022-01-06 ENCOUNTER — ALLIED HEALTH/NURSE VISIT (OUTPATIENT)
Dept: NEUROSURGERY | Facility: CLINIC | Age: 67
End: 2022-01-06
Payer: MEDICARE

## 2022-01-06 VITALS — DIASTOLIC BLOOD PRESSURE: 76 MMHG | HEART RATE: 76 BPM | RESPIRATION RATE: 18 BRPM | SYSTOLIC BLOOD PRESSURE: 124 MMHG

## 2022-01-06 DIAGNOSIS — G95.20 CORD COMPRESSION (H): Primary | ICD-10-CM

## 2022-01-06 PROCEDURE — 99207 PR NO CHARGE NURSE ONLY: CPT

## 2022-01-06 NOTE — PROGRESS NOTES
"Randi Cleary is status post Left-open door laminoplasty with [employer] maxillofacial titanium plates secured to the lamina with screws and secured to the facet with screws at cervical 3, cervical 4, cervical 5 and cervical 6;  Foraminotomies at left cervical 4-cervical 5 and cervical 6-cervical 7 on 12/21/2021 with Dr. Gregory.  Preoperatively presented with  imbalance, numbness and tingling in her hands, fine motor difficulties, left shoulder pain, radiating left arm pain and numbness.  Last seen on 12/31/2021 for wound check. Today she returns in follow up for staples removal. She is accompanied by her spouse. She is ding reasonably well - reports a resolved pain and numbness in her left arm/hand. Balance is much improved. States hands feel \"stronger\". Takes Norco 5/325 mg 2 tabs every 6 hours for intermittent moderate pain in posterior neck and between shoulder blades. On Robaxin and Tylenol as prescribed. Wears a [place] J collar. Uses a cane for safety.         Surgical wound WNL - CDI, no signs of infection or skin breakdown.  Incision well-healed: good skin approximation, no redness or visible/palpable edema, no tenderness to palpation.  PT. AF, denies fever, chills or sweats.  Pt. reports that the symptoms are improved from pre-op.    Staples - intact removed without difficulty. Wound prepped with Betadine before and after removal.  Surrounding skin has no signs of breakdown.  Verbal instructions regarding incision care are given.  Pt. advised to call us if any s/s of infection noted - all discussed in details.      Nalini Cohen, RN, CNRN           "

## 2022-01-06 NOTE — PATIENT INSTRUCTIONS
A dressing is required.    Keep the wound clean.    Wash your hands before touching the wound.  Ensure that anyone assisting you in the care of your wound washes her/his hands before touching the wound. Good handwashing can decrease the risk of serious infection.    If you are unable to see your wound, have someone check the wound daily for redness, swelling,or drainage. A small amount of drainage is normal.    You may shower.  Pat the wound dry. Do not rub.    No tub baths until the wound is well healed.  Usually 5-6 weeks.     If you develop redness, swelling, drainage, or temp 101 or greater, call our clinic.      * No lifting, pushing or pulling greater than 5-10 pound (this is about a gallon of milk) for the first 6 weeks after surgery .  *No repetitive bending, twisting, or jarring activities for 6 weeks.  *No overhead work  *No aerobic or strenuous activity  *No activities with increased risk of falls  *You may move about your home as tolerated  *You may walk up and down stairs as tolerated  *You may increase your activity slowly over the next 6 weeks    WALKING PROGRAM: As you can tolerate, walk daily-start with 5-10 minutes of continuous walking. This is in addition to the walking that you do as part of your daily activities. Increase the time that you walk by 5 minutes every couple of days. Do not exceed 30-45 minutes of continuous walking until seen in follow-up. Walking is the best exercise after surgery.  **Listen to your body, if you find that you are more painful or fatigued, you may need to proceed more slowly.    **Do not smoke or expose yourself to second hand smoke. Cigarette smoke can delay healing and cause complications.     DRIVING:  We recommend that you do not drive while taking medications for pain or muscle spasms. Always read and follow the advice on your prescription bottle. If you have questions, speak with your pharmacist.  We recommend that you do not drive while wearing a brace, as  it could limit your range of motion.    WORK: If you plan to return to work before your 6 week post-op appointment, call and discuss with one of the nurses in the neurosurgery office.

## 2022-01-07 ENCOUNTER — VIRTUAL VISIT (OUTPATIENT)
Dept: PSYCHOLOGY | Facility: CLINIC | Age: 67
End: 2022-01-07
Payer: MEDICARE

## 2022-01-07 DIAGNOSIS — G95.20 CORD COMPRESSION (H): Chronic | ICD-10-CM

## 2022-01-07 DIAGNOSIS — F41.1 GAD (GENERALIZED ANXIETY DISORDER): ICD-10-CM

## 2022-01-07 DIAGNOSIS — F31.81 BIPOLAR 2 DISORDER (H): Primary | ICD-10-CM

## 2022-01-07 PROCEDURE — 90834 PSYTX W PT 45 MINUTES: CPT | Mod: 95 | Performed by: SOCIAL WORKER

## 2022-01-07 RX ORDER — HYDROXYZINE HYDROCHLORIDE 25 MG/1
25 TABLET, FILM COATED ORAL EVERY 6 HOURS PRN
Qty: 30 TABLET | Refills: 0 | Status: SHIPPED | OUTPATIENT
Start: 2022-01-07 | End: 2022-01-18

## 2022-01-07 ASSESSMENT — PATIENT HEALTH QUESTIONNAIRE - PHQ9
10. IF YOU CHECKED OFF ANY PROBLEMS, HOW DIFFICULT HAVE THESE PROBLEMS MADE IT FOR YOU TO DO YOUR WORK, TAKE CARE OF THINGS AT HOME, OR GET ALONG WITH OTHER PEOPLE: SOMEWHAT DIFFICULT
SUM OF ALL RESPONSES TO PHQ QUESTIONS 1-9: 9
SUM OF ALL RESPONSES TO PHQ QUESTIONS 1-9: 9

## 2022-01-07 NOTE — PROGRESS NOTES
Progress Note    Patient Name: Randi Cleary  Date: 1/7/22         Service Type: Individual      Session Start Time: 11:01 AM  Session End Time: 11:48 AM     Session Length: 47 minutes     Session #: 77    Attendees: Client attended alone    Service Modality:  Video Visit:    Telemedicine Visit: The patient's condition can be safely assessed and treated via synchronous audio and visual telemedicine encounter.      Reason for Telemedicine Visit: Services only offered telehealth    Originating Site (Patient Location): Patient's home    Distant Site (Provider Location): Provider Remote Setting- Home Office    Consent:  The patient/guardian has verbally consented to: the potential risks and benefits of telemedicine (video visit) versus in person care; bill my insurance or make self-payment for services provided; and responsibility for payment of non-covered services.     Mode of Communication:  Video Conference via [email or web address]    As the provider I attest to compliance with applicable laws and regulations related to telemedicine.      Treatment Plan Last Reviewed: 12/10/21  PHQ-9 / CHAVO-7 :   PHQ 12/6/2021 12/6/2021 1/7/2022   PHQ-9 Total Score 18 18 9   Q9: Thoughts of better off dead/self-harm past 2 weeks More than half the days More than half the days Several days   F/U: Thoughts of suicide or self-harm Yes Yes No   F/U: Self harm-plan Yes Yes -   F/U: Self-harm action Yes Yes -   F/U: Safety concerns Yes Yes No   Some encounter information is confidential and restricted. Go to Review Flowsheets activity to see all data.     CHAVO-7 SCORE 12/2/2021 12/14/2021 12/14/2021   Total Score 15 (severe anxiety) - 14 (moderate anxiety)   Total Score 15 14 14   Some encounter information is confidential and restricted. Go to Review Flowsheets activity to see all data.         DATA  Extended Session (53+ minutes): No  Interactive Complexity: No  Crisis: No      Progress Since Last Session  (Related to Symptoms / Goals / Homework):   Symptoms: frustration and irritability lately     Homework: Partially completed  Follow the restrictions for physical limitations -done, getting help for this  Be more tolerant of Sidney -not done     Episode of Care Goals: Minimal progress - ACTION (Actively working towards change); Intervened by reinforcing change plan / affirming steps taken     Current / Ongoing Stressors and Concerns:   Patient is currently socially isolated. She has a conflictual relationship with her .  She is getting minimal physical activity.  She had recent eye surgery     Treatment Objective(s) Addressed in This Session:   Patient will increase frequency of engaging her in ADLs.  Patient will track and record at least 5 pleasant exchanges with . Patient will be able to identify at least 5 positive traits about her .  Patient will reduce level of depressive and anxious features as evidenced by reduction in score on her CHAVO-7 and PHQ-9 (scores of 15 and 16 at first measurment, respectively).     Intervention:   Supportive Therapy: Discussed challenges with cat's health and how it's weighing on her. Also talked about challenges with getting help for herself during her recovery. Processed challenges with her relationship. Talked about recognizing her own frustration with her limitations and separting them out from frustrations within her relationship.    ASSESSMENT: Current Emotional / Mental Status (status of significant symptoms):   Risk status (Self / Other harm or suicidal ideation)   Patient denies current fears or concerns for personal safety.   Patient denies current or recent suicidal ideation or behaviors. Of note- patient clicked through the questionnaires without reading them, so she didn't mean to click on suicidal thinking.    Patient denies current or recent homicidal ideation or behaviors.   Patient denies current or recent self injurious behavior or  ideation.   Patient denies other safety concerns.   Patient reports there has been no change in risk factors since their last session.     Patient reports there has been no change in protective factors since their last session.     A safety and risk management plan has been developed including: Patient consented to co-developed safety plan.  Safety and risk management plan was completed.  Patient agreed to use safety plan should any safety concerns arise.  A copy was given to the patient. This was done on 11/24/2020 and is attached to the bottom of the note.     Appearance:   Appropriate    Eye Contact:   Fair    Psychomotor Behavior: Normal    Attitude:   Cooperative    Orientation:   All   Speech    Rate / Production: Normal/ Responsive    Volume:  Normal    Mood:    Anxious    Affect:    Appropriate    Thought Content:  Clear    Thought Form:  Coherent    Insight:    Fair      Medication Review:   No changes to current psychiatric medication(s)      Medication Compliance:   Yes     Changes in Health Issues:   None reported     Chemical Use Review:   Substance Use: decrease in use.  Patient reports frequency of use none since the third week of August.  Provided encouragement towards sobriety        Tobacco Use: No current tobacco use.      Diagnosis:  1. Bipolar 2 disorder (H)    2. CHAVO (generalized anxiety disorder)        Collateral Reports Completed:   Not Applicable    PLAN: (Patient Tasks / Therapist Tasks / Other)  Utilize the help you have so you can stay in your physical limitations  Be more tolerant of Sidney, recognizing when you're frustrated with yourself      Next session- talk about her friend (Aleida) not masking when she goes out and how to deal with this.          MICAELA SLADE    January 7, 2022                                                         ______________________________________________________________________    Treatment Plan    Patient's Name: Randi NICOLE Cleary  Date Of  "Birth: 1955    Date: 12/10/21    DSM5 Diagnoses: 296.32 (F33.1) Major Depressive Disorder, Recurrent Episode, Moderate With anxious distress  Psychosocial / Contextual Factors: Patient's entire family of origin has , she now has a sister-in-law and  as support.  Relationship with  is conflictual.  Patient is reporting increase in physical symptoms due to COVID-19.  Patient is off of pain medications.  WHODAS: 42    Referral / Collaboration:  Referral to another professional/service is not indicated at this time.     Anticipated number of session or this episode of care: 12-15      MeasurableTreatment Goal(s) related to diagnosis / functional impairment(s)  Goal 1: Patient will \"jumpstart, getting going with the things I need to be doing around the house as far as picking up, doing things, trying to do something every day.  Also to lessen the animosity between me and my .\"    I will know I've met my goal when my shoulders are fixed and I can see.      Objective #A (Patient Action)    Patient will increase frequency of engaging her in ADLs.  Status: Continued - Date(s): 12/10/21    Intervention(s)  Therapist will engage patient in CBT, specifically behavioral activation.    Objective #B  Patient will track and record at least 5 pleasant exchanges with . Patient will be able to identify at least 5 positive traits about her  and how he relates to her.  Status: Continued - Date(s): 12/10/21    Intervention(s)  Therapist will teach assertiveness skills and assign homework related to relationship interactions.    Objective #C  Patient will reduce level of depressive and anxious features as evidenced by reduction in score on her CHAVO-7 and PHQ-9 (scores of 15 and 16 at first measurment, respectively).  Status: Continued - Date(s):  12/10/21    Intervention(s)  Therapist will engage patient in person-centered therapy and CBT.    Patient has reviewed and agreed to the above " "plan.      CRUZ MICAELA BAKER JO  December 10, 2021                                                Randi Cleary          SAFETY PLAN:  Step 1: Warning signs / cues (Thoughts, images, mood, situation, behavior) that a crisis may be developing:  ? Thoughts: \"I don't want to continue\" \"I am unwanted\"  ? Images: none  ? Thinking Processes: ruminating  ? Mood: anger  ? Behaviors: isolating/withdrawing , can't stop crying, not taking care of myself and not taking care of my responsibilities  ? Situations: small triggers, such as not being able to find something, or dropping something   Step 2: Coping strategies - Things I can do to take my mind off of my problems without contacting another person (relaxation technique, physical activity):  ? Distress Tolerance Strategies:  arts and crafts: drawing, play with my pet , listen to positive and upbeat music: any, change body temperature (ice pack/cold water)  and paced breathing/progressive muscle relaxation  ? Physical Activities: go for a walk, deep breathing and stretching   ? Focus on helpful thoughts:  \"You've been through this before, you can get through it again.\"  Step 3: People and social settings that provide distraction:                 Name: Carmen                            Name: Darien                           Name: Aleida       ? pool, shopping, Carmen's house, [employer]       Step 4: Remind myself of people and things that are important to me and worth living for:  Clifford Little Donna, post-COVID world, options of what could be in your future        Step 5: When I am in crisis, I can ask these people to help me use my safety plan:                 Name: Sidney  Step 6: Making the environment safe:   ? go to sleep/daydream  Step 7: Professionals or agencies I can contact during a crisis:  ? [facility] Daytime Number: [phone]  ? Suicide Prevention Lifeline: 1-387-454-OWAD (8612)  ? Crisis Text Line Service (available 24 hours a day, 7 days a " week): Text [place] to 010363    Local Crisis Services: [facility] Crisis: [phone]     Call 911 or go to my nearest emergency department.       I helped develop this safety plan and agree to use it when needed.  I have been given a copy of this plan.       Client signature _________________________________________________________________  Today s date:  11/24/2020  Adapted from Safety Plan Template 2008 Randi Poole and Robby Barba is reprinted with the express permission of the authors.  No portion of the Safety Plan Template may be reproduced without the express, written permission.  You can contact the authors at [email or web address] or [email or web address].

## 2022-01-07 NOTE — PATIENT INSTRUCTIONS
Utilize the help you have so you can stay in your physical limitations  Be more tolerant of Sidney, recognizing when you're frustrated with yourself

## 2022-01-08 ASSESSMENT — PATIENT HEALTH QUESTIONNAIRE - PHQ9: SUM OF ALL RESPONSES TO PHQ QUESTIONS 1-9: 9

## 2022-01-10 ENCOUNTER — VIRTUAL VISIT (OUTPATIENT)
Dept: PSYCHOLOGY | Facility: CLINIC | Age: 67
End: 2022-01-10
Payer: MEDICARE

## 2022-01-10 ENCOUNTER — TELEPHONE (OUTPATIENT)
Dept: FAMILY MEDICINE | Facility: CLINIC | Age: 67
End: 2022-01-10
Payer: MEDICARE

## 2022-01-10 ENCOUNTER — TELEPHONE (OUTPATIENT)
Dept: NEUROSURGERY | Facility: CLINIC | Age: 67
End: 2022-01-10
Payer: MEDICARE

## 2022-01-10 DIAGNOSIS — F41.1 GAD (GENERALIZED ANXIETY DISORDER): ICD-10-CM

## 2022-01-10 DIAGNOSIS — G95.20 CORD COMPRESSION (H): Chronic | ICD-10-CM

## 2022-01-10 DIAGNOSIS — F31.81 BIPOLAR 2 DISORDER (H): Primary | ICD-10-CM

## 2022-01-10 PROCEDURE — 90834 PSYTX W PT 45 MINUTES: CPT | Mod: 95 | Performed by: SOCIAL WORKER

## 2022-01-10 RX ORDER — HYDROCODONE BITARTRATE AND ACETAMINOPHEN 5; 325 MG/1; MG/1
1-2 TABLET ORAL EVERY 6 HOURS PRN
Qty: 42 TABLET | Refills: 0 | Status: SHIPPED | OUTPATIENT
Start: 2022-01-10 | End: 2022-01-17

## 2022-01-10 ASSESSMENT — PATIENT HEALTH QUESTIONNAIRE - PHQ9
10. IF YOU CHECKED OFF ANY PROBLEMS, HOW DIFFICULT HAVE THESE PROBLEMS MADE IT FOR YOU TO DO YOUR WORK, TAKE CARE OF THINGS AT HOME, OR GET ALONG WITH OTHER PEOPLE: VERY DIFFICULT
SUM OF ALL RESPONSES TO PHQ QUESTIONS 1-9: 17
SUM OF ALL RESPONSES TO PHQ QUESTIONS 1-9: 17

## 2022-01-10 NOTE — TELEPHONE ENCOUNTER
Winnie calls to inquire about another [facility] refill for ongoing posterior neck and bilateral shoulder pain. Takes 2 tabs every 6 hours, also on Robaxin and Tylenol prn. Uses Lidocaine patches.   Nalini Cohen RN, CNRN

## 2022-01-10 NOTE — PROGRESS NOTES
Progress Note    Patient Name: Randi Cleary  Date: 1/10/22         Service Type: Individual      Session Start Time: 11:35 AM  Session End Time: 12:23 AM     Session Length: 48 minutes     Session #: 78    Attendees: Client attended alone    Service Modality:  Video Visit:    Telemedicine Visit: The patient's condition can be safely assessed and treated via synchronous audio and visual telemedicine encounter.      Reason for Telemedicine Visit: Services only offered telehealth    Originating Site (Patient Location): Patient's home    Distant Site (Provider Location): Provider Remote Setting- Home Office    Consent:  The patient/guardian has verbally consented to: the potential risks and benefits of telemedicine (video visit) versus in person care; bill my insurance or make self-payment for services provided; and responsibility for payment of non-covered services.     Mode of Communication:  Video Conference via [employer]    As the provider I attest to compliance with applicable laws and regulations related to telemedicine.      Treatment Plan Last Reviewed: 12/10/21  PHQ-9 / CHAVO-7 :   PHQ 12/6/2021 1/7/2022 1/10/2022   PHQ-9 Total Score 18 9 17   Q9: Thoughts of better off dead/self-harm past 2 weeks More than half the days Several days Several days   F/U: Thoughts of suicide or self-harm Yes No No   F/U: Self harm-plan Yes - -   F/U: Self-harm action Yes - -   F/U: Safety concerns Yes No No   Some encounter information is confidential and restricted. Go to Review Flowsheets activity to see all data.     CHAVO-7 SCORE 12/2/2021 12/14/2021 12/14/2021   Total Score 15 (severe anxiety) - 14 (moderate anxiety)   Total Score 15 14 14   Some encounter information is confidential and restricted. Go to Review Flowsheets activity to see all data.         DATA  Extended Session (53+ minutes): No  Interactive Complexity: No  Crisis: No      Progress Since Last Session (Related to  "Symptoms / Goals / Homework):   Symptoms: \"not so good,\" reporting lots of concerns with her  and an increased level of anger     Homework: Did not complete  Utilize the help you have so you can stay in your physical limitations -not done  Be more tolerant of Sidney, recognizing when you're frustrated with yourself -not dnoe       Episode of Care Goals: Minimal progress - ACTION (Actively working towards change); Intervened by reinforcing change plan / affirming steps taken     Current / Ongoing Stressors and Concerns:   Patient is currently socially isolated. She has a conflictual relationship with her .  She is getting minimal physical activity.  She had recent eye surgery     Treatment Objective(s) Addressed in This Session:   Patient will increase frequency of engaging her in ADLs.  Patient will track and record at least 5 pleasant exchanges with . Patient will be able to identify at least 5 positive traits about her .  Patient will reduce level of depressive and anxious features as evidenced by reduction in score on her CHAVO-7 and PHQ-9 (scores of 15 and 16 at first measurment, respectively).     Intervention:   Supportive Therapy: Discussed challenges in her relationship. Talked about physical challenges as well. Discussed how to care for herself, including honoring her restrictions. Talked about upcoming testing and her concerns around this. Discussed how she's been talking about the topic of the day for awhile, and struggling to get into some of the deeper work. Identified she is ready to go into this more on a weekly basis.      ASSESSMENT: Current Emotional / Mental Status (status of significant symptoms):   Risk status (Self / Other harm or suicidal ideation)   Patient denies current fears or concerns for personal safety.   Patient denies current or recent suicidal ideation or behaviors.     Patient denies current or recent homicidal ideation or behaviors.   Patient denies current " or recent self injurious behavior or ideation.   Patient denies other safety concerns.   Patient reports there has been no change in risk factors since their last session.     Patient reports there has been no change in protective factors since their last session.     A safety and risk management plan has been developed including: Patient consented to co-developed safety plan.  Safety and risk management plan was completed.  Patient agreed to use safety plan should any safety concerns arise.  A copy was given to the patient at a past session, on 11/24/2020, and a copy is attached to the bottom of this note.      Appearance:   Appropriate    Eye Contact:   Fair    Psychomotor Behavior: Normal    Attitude:   Cooperative    Orientation:   All   Speech    Rate / Production: Normal/ Responsive    Volume:  Normal    Mood:    Anxious    Affect:    Appropriate    Thought Content:  Clear    Thought Form:  Coherent    Insight:    Fair      Medication Review:   No changes to current psychiatric medication(s)      Medication Compliance:   Yes     Changes in Health Issues:   None reported     Chemical Use Review:   Substance Use: decrease in use.  Patient reports frequency of use none since the third week of August.  Provided encouragement towards sobriety        Tobacco Use: No current tobacco use.      Diagnosis:  1. Bipolar 2 disorder (H)    2. 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          MICAELA SLADE    January 10, 2022                                                         ______________________________________________________________________    Treatment Plan    Patient's Name: Randi Cleary  YOB: 1955    Date: 12/10/21    DSM5 Diagnoses: 296.32 (F33.1) Major Depressive Disorder,  "Recurrent Episode, Moderate With anxious distress  Psychosocial / Contextual Factors: Patient's entire family of origin has , she now has a sister-in-law and  as support.  Relationship with  is conflictual.  Patient is reporting increase in physical symptoms due to COVID-19.  Patient is off of pain medications.  WHODAS: 42    Referral / Collaboration:  Referral to another professional/service is not indicated at this time.     Anticipated number of session or this episode of care: 12-15      MeasurableTreatment Goal(s) related to diagnosis / functional impairment(s)  Goal 1: Patient will \"jumpstart, getting going with the things I need to be doing around the house as far as picking up, doing things, trying to do something every day.  Also to lessen the animosity between me and my .\"    I will know I've met my goal when my shoulders are fixed and I can see.      Objective #A (Patient Action)    Patient will increase frequency of engaging her in ADLs.  Status: Continued - Date(s): 12/10/21    Intervention(s)  Therapist will engage patient in CBT, specifically behavioral activation.    Objective #B  Patient will track and record at least 5 pleasant exchanges with . Patient will be able to identify at least 5 positive traits about her  and how he relates to her.  Status: Continued - Date(s): 12/10/21    Intervention(s)  Therapist will teach assertiveness skills and assign homework related to relationship interactions.    Objective #C  Patient will reduce level of depressive and anxious features as evidenced by reduction in score on her CHAVO-7 and PHQ-9 (scores of 15 and 16 at first measurment, respectively).  Status: Continued - Date(s):  12/10/21    Intervention(s)  Therapist will engage patient in person-centered therapy and CBT.    Patient has reviewed and agreed to the above plan.      MICAELA SLADE  December 10, 2021                                                Randi RIVERA" "Cathy Cleary          SAFETY PLAN:  Step 1: Warning signs / cues (Thoughts, images, mood, situation, behavior) that a crisis may be developing:  ? Thoughts: \"I don't want to continue\" \"I am unwanted\"  ? Images: none  ? Thinking Processes: ruminating  ? Mood: anger  ? Behaviors: isolating/withdrawing , can't stop crying, not taking care of myself and not taking care of my responsibilities  ? Situations: small triggers, such as not being able to find something, or dropping something   Step 2: Coping strategies - Things I can do to take my mind off of my problems without contacting another person (relaxation technique, physical activity):  ? Distress Tolerance Strategies:  arts and crafts: drawing, play with my pet , listen to positive and upbeat music: any, change body temperature (ice pack/cold water)  and paced breathing/progressive muscle relaxation  ? Physical Activities: go for a walk, deep breathing and stretching   ? Focus on helpful thoughts:  \"You've been through this before, you can get through it again.\"  Step 3: People and social settings that provide distraction:                 Name: Carmen                            Name: Darien                           Name: Aleida       ? pool, shopping, Carmen's house, [employer]       Step 4: Remind myself of people and things that are important to me and worth living for:  Clifford Little Donna, post-COVID world, options of what could be in your future        Step 5: When I am in crisis, I can ask these people to help me use my safety plan:                 Name: Sidney  Step 6: Making the environment safe:   ? go to sleep/daydream  Step 7: Professionals or agencies I can contact during a crisis:  ? [facility] Daytime Number: [phone]  ? Suicide Prevention Lifeline: 5-647-394-TALK (1654)  ? Crisis Text Line Service (available 24 hours a day, 7 days a week): Text [place] to 968982    Local Crisis Services: [facility] Crisis: [phone]     Call 911 or go " to my nearest emergency department.       I helped develop this safety plan and agree to use it when needed.  I have been given a copy of this plan.       Client signature _________________________________________________________________  Today s date:  11/24/2020  Adapted from Safety Plan Template 2008 Randi Poole and Robby Barba is reprinted with the express permission of the authors.  No portion of the Safety Plan Template may be reproduced without the express, written permission.  You can contact the authors at [email or web address] or [email or web address].

## 2022-01-10 NOTE — TELEPHONE ENCOUNTER
Reason for Call:  Home Health Care    See with [facility] called regarding OT orders    Orders are needed for this patient. OT    OT: 1 time/week for 1 week, 2 times/week for 2 weeks, 1 time/week for 2 weeks, 2 prn    Phone Number Homecare Nurse can be reached at: [phone]    Can we leave a detailed message on this number? YES    Phone number patient can be reached at: [phone]    Call taken on 1/10/2022 at 2:49 PM by Leah Pineda

## 2022-01-11 ENCOUNTER — TELEPHONE (OUTPATIENT)
Dept: FAMILY MEDICINE | Facility: CLINIC | Age: 67
End: 2022-01-11
Payer: MEDICARE

## 2022-01-11 ENCOUNTER — MEDICAL CORRESPONDENCE (OUTPATIENT)
Dept: HEALTH INFORMATION MANAGEMENT | Facility: CLINIC | Age: 67
End: 2022-01-11
Payer: MEDICARE

## 2022-01-11 ASSESSMENT — PATIENT HEALTH QUESTIONNAIRE - PHQ9: SUM OF ALL RESPONSES TO PHQ QUESTIONS 1-9: 17

## 2022-01-12 ENCOUNTER — MEDICAL CORRESPONDENCE (OUTPATIENT)
Dept: HEALTH INFORMATION MANAGEMENT | Facility: CLINIC | Age: 67
End: 2022-01-12
Payer: MEDICARE

## 2022-01-14 ENCOUNTER — VIRTUAL VISIT (OUTPATIENT)
Dept: PSYCHOLOGY | Facility: CLINIC | Age: 67
End: 2022-01-14
Payer: MEDICARE

## 2022-01-14 ENCOUNTER — TELEPHONE (OUTPATIENT)
Dept: NEUROSURGERY | Facility: CLINIC | Age: 67
End: 2022-01-14
Payer: MEDICARE

## 2022-01-14 DIAGNOSIS — F31.81 BIPOLAR 2 DISORDER (H): Primary | ICD-10-CM

## 2022-01-14 DIAGNOSIS — F41.1 GAD (GENERALIZED ANXIETY DISORDER): ICD-10-CM

## 2022-01-14 DIAGNOSIS — M54.12 CERVICAL RADICULOPATHY: ICD-10-CM

## 2022-01-14 PROCEDURE — 90834 PSYTX W PT 45 MINUTES: CPT | Mod: 95 | Performed by: SOCIAL WORKER

## 2022-01-14 RX ORDER — METHOCARBAMOL 500 MG/1
500 TABLET, FILM COATED ORAL 4 TIMES DAILY PRN
Qty: 40 TABLET | Refills: 0 | Status: SHIPPED | OUTPATIENT
Start: 2022-01-14 | End: 2022-02-10

## 2022-01-14 ASSESSMENT — ANXIETY QUESTIONNAIRES
GAD7 TOTAL SCORE: 18
6. BECOMING EASILY ANNOYED OR IRRITABLE: NEARLY EVERY DAY
7. FEELING AFRAID AS IF SOMETHING AWFUL MIGHT HAPPEN: NEARLY EVERY DAY
GAD7 TOTAL SCORE: 18
GAD7 TOTAL SCORE: 18
4. TROUBLE RELAXING: NEARLY EVERY DAY
GAD7 TOTAL SCORE: 18
7. FEELING AFRAID AS IF SOMETHING AWFUL MIGHT HAPPEN: NEARLY EVERY DAY
2. NOT BEING ABLE TO STOP OR CONTROL WORRYING: MORE THAN HALF THE DAYS
GAD7 TOTAL SCORE: 18
1. FEELING NERVOUS, ANXIOUS, OR ON EDGE: NEARLY EVERY DAY
7. FEELING AFRAID AS IF SOMETHING AWFUL MIGHT HAPPEN: NEARLY EVERY DAY
5. BEING SO RESTLESS THAT IT IS HARD TO SIT STILL: MORE THAN HALF THE DAYS
6. BECOMING EASILY ANNOYED OR IRRITABLE: NEARLY EVERY DAY
7. FEELING AFRAID AS IF SOMETHING AWFUL MIGHT HAPPEN: NEARLY EVERY DAY
GAD7 TOTAL SCORE: 18
3. WORRYING TOO MUCH ABOUT DIFFERENT THINGS: MORE THAN HALF THE DAYS
6. BECOMING EASILY ANNOYED OR IRRITABLE: NEARLY EVERY DAY
5. BEING SO RESTLESS THAT IT IS HARD TO SIT STILL: MORE THAN HALF THE DAYS
4. TROUBLE RELAXING: NEARLY EVERY DAY
3. WORRYING TOO MUCH ABOUT DIFFERENT THINGS: MORE THAN HALF THE DAYS
5. BEING SO RESTLESS THAT IT IS HARD TO SIT STILL: MORE THAN HALF THE DAYS
6. BECOMING EASILY ANNOYED OR IRRITABLE: NEARLY EVERY DAY
7. FEELING AFRAID AS IF SOMETHING AWFUL MIGHT HAPPEN: NEARLY EVERY DAY
1. FEELING NERVOUS, ANXIOUS, OR ON EDGE: NEARLY EVERY DAY
GAD7 TOTAL SCORE: 18
4. TROUBLE RELAXING: NEARLY EVERY DAY
2. NOT BEING ABLE TO STOP OR CONTROL WORRYING: MORE THAN HALF THE DAYS
2. NOT BEING ABLE TO STOP OR CONTROL WORRYING: MORE THAN HALF THE DAYS
4. TROUBLE RELAXING: NEARLY EVERY DAY
GAD7 TOTAL SCORE: 18
7. FEELING AFRAID AS IF SOMETHING AWFUL MIGHT HAPPEN: NEARLY EVERY DAY
7. FEELING AFRAID AS IF SOMETHING AWFUL MIGHT HAPPEN: NEARLY EVERY DAY
1. FEELING NERVOUS, ANXIOUS, OR ON EDGE: NEARLY EVERY DAY
GAD7 TOTAL SCORE: 18
GAD7 TOTAL SCORE: 18
3. WORRYING TOO MUCH ABOUT DIFFERENT THINGS: MORE THAN HALF THE DAYS
1. FEELING NERVOUS, ANXIOUS, OR ON EDGE: NEARLY EVERY DAY
GAD7 TOTAL SCORE: 18
7. FEELING AFRAID AS IF SOMETHING AWFUL MIGHT HAPPEN: NEARLY EVERY DAY
3. WORRYING TOO MUCH ABOUT DIFFERENT THINGS: MORE THAN HALF THE DAYS
GAD7 TOTAL SCORE: 18
2. NOT BEING ABLE TO STOP OR CONTROL WORRYING: MORE THAN HALF THE DAYS
5. BEING SO RESTLESS THAT IT IS HARD TO SIT STILL: MORE THAN HALF THE DAYS

## 2022-01-14 ASSESSMENT — PATIENT HEALTH QUESTIONNAIRE - PHQ9
10. IF YOU CHECKED OFF ANY PROBLEMS, HOW DIFFICULT HAVE THESE PROBLEMS MADE IT FOR YOU TO DO YOUR WORK, TAKE CARE OF THINGS AT HOME, OR GET ALONG WITH OTHER PEOPLE: EXTREMELY DIFFICULT
SUM OF ALL RESPONSES TO PHQ QUESTIONS 1-9: 20
SUM OF ALL RESPONSES TO PHQ QUESTIONS 1-9: 20

## 2022-01-14 NOTE — TELEPHONE ENCOUNTER
"Winnie called today very distraught about the lack of care she feels she is receiving from the home health care agency. She reported that she has had several cancelled visitations from their care team and feels this is her \"lifeline\" as her  works out of the house during the week.     Writer verified whether or not the pt has called the agency to voice her concerns to their management/scheduling team, pt stated she has not.     Pt also requested a refill of robaxin. She takes 500mg tablets TID and 1000mg at bedtime. Refill sent to [facility].     Courtesy call placed to [facility] - [phone]. Message left.      Duration of call 24:05    Sowmya Oshea RN   "

## 2022-01-14 NOTE — PROGRESS NOTES
Progress Note    Patient Name: Randi Cleary  Date: 1/14/22         Service Type: Individual      Session Start Time: 11:01 AM  Session End Time: 11:50 AM     Session Length: 49 minutes     Session #: 79    Attendees: Client attended alone    Service Modality:  Video Visit:    Telemedicine Visit: The patient's condition can be safely assessed and treated via synchronous audio and visual telemedicine encounter.      Reason for Telemedicine Visit: Services only offered telehealth    Originating Site (Patient Location): Patient's home    Distant Site (Provider Location): Provider Remote Setting- Home Office    Consent:  The patient/guardian has verbally consented to: the potential risks and benefits of telemedicine (video visit) versus in person care; bill my insurance or make self-payment for services provided; and responsibility for payment of non-covered services.     Mode of Communication:  Video Conference via Popular[employer]    As the provider I attest to compliance with applicable laws and regulations related to telemedicine.      Treatment Plan Last Reviewed: 12/10/21  PHQ-9 / CHAVO-7 :   PHQ 1/7/2022 1/10/2022 1/14/2022   PHQ-9 Total Score 9 17 20   Q9: Thoughts of better off dead/self-harm past 2 weeks Several days Several days Several days   F/U: Thoughts of suicide or self-harm No No No   F/U: Self harm-plan - - -   F/U: Self-harm action - - -   F/U: Safety concerns No No No   Some encounter information is confidential and restricted. Go to Review Flowsheets activity to see all data.     CHAVO-7 SCORE 1/14/2022 1/14/2022 1/14/2022   Total Score - - 18 (severe anxiety)   Total Score 18 18 18   Some encounter information is confidential and restricted. Go to Review Flowsheets activity to see all data.         DATA  Extended Session (53+ minutes): No  Interactive Complexity: No  Crisis: No      Progress Since Last Session (Related to Symptoms / Goals / Homework):   Symptoms:  reporting she's struggling and has had passive suicidal thoughts     Homework: Did not complete  Utilize the help you have so you can stay in your physical limitations- not done       Episode of Care Goals: Minimal progress - ACTION (Actively working towards change); Intervened by reinforcing change plan / affirming steps taken     Current / Ongoing Stressors and Concerns:   Patient is currently socially isolated. She has a conflictual relationship with her .  She is getting minimal physical activity.  She had recent eye surgery     Treatment Objective(s) Addressed in This Session:   Patient will increase frequency of engaging her in ADLs.  Patient will track and record at least 5 pleasant exchanges with . Patient will be able to identify at least 5 positive traits about her .  Patient will reduce level of depressive and anxious features as evidenced by reduction in score on her CHAVO-7 and PHQ-9 (scores of 15 and 16 at first measurment, respectively).     Intervention:   Supportive Therapy: Internal Family Systems Therapy: Reviewed safety information. She's had some challenges with relationships, feeling sad around this. Had patient recognized where she carries this in her body and focus in on this, noticing it as tearing up and not beng able to breathe. Patient was able to sit with and really recognize the loneliness she experiences, finding compassion for this part of herself. She had some frustration, helplessness, and anger at this at first, but was able to set this aside to extend compassion. Patient recognized this part is more raw than it used to be as it used to be numbed by alcohol use, but now that she doesn't do this, it is harder to know how to help the loneliness. Talked about how to continue to work with this loneliness, patient would like to journal about this. She also recognized she needs to send some kindness towards herself.    ASSESSMENT: Current Emotional / Mental Status  (status of significant symptoms):   Risk status (Self / Other harm or suicidal ideation)   Patient denies current fears or concerns for personal safety.   Patient reports the following current or recent suicidal ideation or behaviors: passive ideation.     Patient denies current or recent homicidal ideation or behaviors.   Patient denies current or recent self injurious behavior or ideation.   Patient denies other safety concerns.   Patient reports there has been no change in risk factors since their last session.     Patient reports there has been no change in protective factors since their last session.     A safety and risk management plan has been developed including: Patient consented to co-developed safety plan.  Safety and risk management plan was completed.  Patient agreed to use safety plan should any safety concerns arise.  A copy was given to the patient at a past session, on 11/24/2020, and a copy is attached to the bottom of this note.      Appearance:   Appropriate    Eye Contact:   Fair    Psychomotor Behavior: Normal    Attitude:   Cooperative    Orientation:   All   Speech    Rate / Production: Normal/ Responsive    Volume:  Normal    Mood:    Anxious  Sad    Affect:    Tearful   Thought Content:  Clear    Thought Form:  Coherent    Insight:    Fair      Medication Review:   No changes to current psychiatric medication(s)      Medication Compliance:   Yes     Changes in Health Issues:   None reported     Chemical Use Review:   Substance Use: decrease in use.  Patient reports frequency of use none since the third week of August.  Provided encouragement towards sobriety        Tobacco Use: No current tobacco use.      Diagnosis:  1. Bipolar 2 disorder (H)    2. CHAVO (generalized anxiety disorder)        Collateral Reports Completed:   Not Applicable    PLAN: (Patient Tasks / Therapist Tasks / Other)  Check in with this part of you that feels lonely- consider writing emails as diary entries about  "MICAELA Irwin    2022                                                         ______________________________________________________________________    Treatment Plan    Patient's Name: Randi Cleary  YOB: 1955    Date: 12/10/21    DSM5 Diagnoses: 296.32 (F33.1) Major Depressive Disorder, Recurrent Episode, Moderate With anxious distress  Psychosocial / Contextual Factors: Patient's entire family of origin has , she now has a sister-in-law and  as support.  Relationship with  is conflictual.  Patient is reporting increase in physical symptoms due to COVID-19.  Patient is off of pain medications.  WHODAS: 42    Referral / Collaboration:  Referral to another professional/service is not indicated at this time.     Anticipated number of session or this episode of care: 12-15      MeasurableTreatment Goal(s) related to diagnosis / functional impairment(s)  Goal 1: Patient will \"jumpstart, getting going with the things I need to be doing around the house as far as picking up, doing things, trying to do something every day.  Also to lessen the animosity between me and my .\"    I will know I've met my goal when my shoulders are fixed and I can see.      Objective #A (Patient Action)    Patient will increase frequency of engaging her in ADLs.  Status: Continued - Date(s): 12/10/21    Intervention(s)  Therapist will engage patient in CBT, specifically behavioral activation.    Objective #B  Patient will track and record at least 5 pleasant exchanges with . Patient will be able to identify at least 5 positive traits about her  and how he relates to her.  Status: Continued - Date(s): 12/10/21    Intervention(s)  Therapist will teach assertiveness skills and assign homework related to relationship interactions.    Objective #C  Patient will reduce level of depressive and anxious features as evidenced by reduction in score on her CHAVO-7 and " "PHQ-9 (scores of 15 and 16 at first measurment, respectively).  Status: Continued - Date(s):  12/10/21    Intervention(s)  Therapist will engage patient in person-centered therapy and CBT.    Patient has reviewed and agreed to the above plan.      MICAELA SLADE  December 10, 2021                                                Randi Cleary          SAFETY PLAN:  Step 1: Warning signs / cues (Thoughts, images, mood, situation, behavior) that a crisis may be developing:  ? Thoughts: \"I don't want to continue\" \"I am unwanted\"  ? Images: none  ? Thinking Processes: ruminating  ? Mood: anger  ? Behaviors: isolating/withdrawing , can't stop crying, not taking care of myself and not taking care of my responsibilities  ? Situations: small triggers, such as not being able to find something, or dropping something   Step 2: Coping strategies - Things I can do to take my mind off of my problems without contacting another person (relaxation technique, physical activity):  ? Distress Tolerance Strategies:  arts and crafts: drawing, play with my pet , listen to positive and upbeat music: any, change body temperature (ice pack/cold water)  and paced breathing/progressive muscle relaxation  ? Physical Activities: go for a walk, deep breathing and stretching   ? Focus on helpful thoughts:  \"You've been through this before, you can get through it again.\"  Step 3: People and social settings that provide distraction:                 Name: Carmen                            Name: Darien                           Name: Aleida       ? pool, shopping, Carmen's house, [employer]       Step 4: Remind myself of people and things that are important to me and worth living for:  Clifford Little Donna, post-COVID world, options of what could be in your future        Step 5: When I am in crisis, I can ask these people to help me use my safety plan:                 Name: Sidney  Step 6: Making the environment safe:   ? go to sleep/daydream  Step 7: " Professionals or agencies I can contact during a crisis:  ? [facility] Daytime Number: [phone]  ? Suicide Prevention Lifeline: 3-876-533-UNZA (4911)  ? Crisis Text Line Service (available 24 hours a day, 7 days a week): Text [place] to 134744    Local Crisis Services: [facility] Crisis: [phone]     Call 911 or go to my nearest emergency department.       I helped develop this safety plan and agree to use it when needed.  I have been given a copy of this plan.       Client signature _________________________________________________________________  Today s date:  11/24/2020  Adapted from Safety Plan Template 2008 Randi Poole and Robby Barba is reprinted with the express permission of the authors.  No portion of the Safety Plan Template may be reproduced without the express, written permission.  You can contact the authors at [email or web address] or [email or web address].

## 2022-01-15 ASSESSMENT — ANXIETY QUESTIONNAIRES
GAD7 TOTAL SCORE: 18

## 2022-01-15 ASSESSMENT — PATIENT HEALTH QUESTIONNAIRE - PHQ9: SUM OF ALL RESPONSES TO PHQ QUESTIONS 1-9: 20

## 2022-01-17 ENCOUNTER — TELEPHONE (OUTPATIENT)
Dept: BEHAVIORAL HEALTH | Facility: CLINIC | Age: 67
End: 2022-01-17
Payer: MEDICARE

## 2022-01-17 ENCOUNTER — TELEPHONE (OUTPATIENT)
Dept: NEUROSURGERY | Facility: CLINIC | Age: 67
End: 2022-01-17
Payer: MEDICARE

## 2022-01-17 DIAGNOSIS — G95.20 CORD COMPRESSION (H): Chronic | ICD-10-CM

## 2022-01-17 RX ORDER — HYDROCODONE BITARTRATE AND ACETAMINOPHEN 5; 325 MG/1; MG/1
1-2 TABLET ORAL EVERY 6 HOURS PRN
Qty: 42 TABLET | Refills: 0 | Status: SHIPPED | OUTPATIENT
Start: 2022-01-17 | End: 2022-01-25

## 2022-01-17 NOTE — TELEPHONE ENCOUNTER
Randi Cleary is status post Left-open door laminoplasty with S[employer]s maxillofacial titanium plates secured to the lamina with screws and secured to the facet with screws at cervical 3, cervical 4, cervical 5 and cervical 6;  Foraminotomies at left cervical 4-cervical 5 and cervical 6-cervical 7 on 12/21/2021 with Dr. Gregory.  Last seen on 1/6/2022, staples were removed.  Today she calls to inquire about Norco refill - takes 1-2 tabs every 6 hours, on Robaxin and Tylenol as prescribed. Uses heat. Reports intermittent moderate pain in her neck, shoulders and between shoulder blades. No arm pain. Denies sensory or motor issues in UE.    Last Norco Rx #42 on 01/10/2022.  [place] query completed - no red flags.  Nalini Cohen, RN, CNRN

## 2022-01-17 NOTE — CONFIDENTIAL NOTE
Refilled. Sent to [facility] in [place], let me know if she would like a different pharmacy.

## 2022-01-18 ENCOUNTER — VIRTUAL VISIT (OUTPATIENT)
Dept: PSYCHOLOGY | Facility: CLINIC | Age: 67
End: 2022-01-18
Payer: MEDICARE

## 2022-01-18 DIAGNOSIS — F31.81 BIPOLAR 2 DISORDER (H): Primary | ICD-10-CM

## 2022-01-18 DIAGNOSIS — F41.1 GAD (GENERALIZED ANXIETY DISORDER): ICD-10-CM

## 2022-01-18 PROCEDURE — 90837 PSYTX W PT 60 MINUTES: CPT | Mod: 95 | Performed by: SOCIAL WORKER

## 2022-01-18 RX ORDER — HYDROXYZINE HYDROCHLORIDE 25 MG/1
25 TABLET, FILM COATED ORAL EVERY 6 HOURS PRN
Qty: 30 TABLET | Refills: 0 | Status: SHIPPED | OUTPATIENT
Start: 2022-01-18 | End: 2022-02-23

## 2022-01-18 ASSESSMENT — PATIENT HEALTH QUESTIONNAIRE - PHQ9
SUM OF ALL RESPONSES TO PHQ QUESTIONS 1-9: 20
SUM OF ALL RESPONSES TO PHQ QUESTIONS 1-9: 19
10. IF YOU CHECKED OFF ANY PROBLEMS, HOW DIFFICULT HAVE THESE PROBLEMS MADE IT FOR YOU TO DO YOUR WORK, TAKE CARE OF THINGS AT HOME, OR GET ALONG WITH OTHER PEOPLE: EXTREMELY DIFFICULT
SUM OF ALL RESPONSES TO PHQ QUESTIONS 1-9: 19

## 2022-01-18 NOTE — TELEPHONE ENCOUNTER
Received fax request from   hydroxyzine  Last refilled on 1/8/22    Pt last seen on 10/21/21  Next appt scheduled for 1/20/22    Pending Prescriptions:                       Disp   Refills    hydrOXYzine (ATARAX) 25 MG tablet         30 tab*0            Sig: Take 1 tablet (25 mg) by mouth every 6 hours as           needed for anxiety    Cub

## 2022-01-19 ENCOUNTER — VIRTUAL VISIT (OUTPATIENT)
Dept: PSYCHOLOGY | Facility: CLINIC | Age: 67
End: 2022-01-19
Payer: MEDICARE

## 2022-01-19 DIAGNOSIS — F41.1 GAD (GENERALIZED ANXIETY DISORDER): ICD-10-CM

## 2022-01-19 DIAGNOSIS — F31.81 BIPOLAR 2 DISORDER (H): ICD-10-CM

## 2022-01-19 DIAGNOSIS — F33.1 MAJOR DEPRESSIVE DISORDER, RECURRENT EPISODE, MODERATE (H): Primary | ICD-10-CM

## 2022-01-19 PROCEDURE — 90834 PSYTX W PT 45 MINUTES: CPT | Mod: 95 | Performed by: PSYCHOLOGIST

## 2022-01-19 ASSESSMENT — PATIENT HEALTH QUESTIONNAIRE - PHQ9: SUM OF ALL RESPONSES TO PHQ QUESTIONS 1-9: 19

## 2022-01-19 NOTE — PROGRESS NOTES
Progress Note - Initial Visit    Client Name:  Randi Cleary Date: 2022         Service Type: Individual     Visit Start Time: 9:00am  Visit End Time: 9:50am    Visit #: 1    Attendees: Client attended alone    Service Modality:  Video Visit:      Provider verified identity through the following two step process.  Patient provided:  Patient  and Patient address    Telemedicine Visit: The patient's condition can be safely assessed and treated via synchronous audio and visual telemedicine encounter.      Reason for Telemedicine Visit: Services only offered telehealth    Originating Site (Patient Location): Patient's home    Distant Site (Provider Location): Provider Remote Setting- Home Office    Consent:  The patient/guardian has verbally consented to: the potential risks and benefits of telemedicine (video visit) versus in person care; bill my insurance or make self-payment for services provided; and responsibility for payment of non-covered services.     Patient would like the video invitation sent by:  Send to e-mail at: [email or web address]    Mode of Communication:  Video Conference via Amwell    As the provider I attest to compliance with applicable laws and regulations related to telemedicine.       DATA:   Interactive Complexity: No   Crisis: No     Presenting Concerns/Current Stressors:   Patient presented to session to initiate the general psychological evaluation process.      ASSESSMENT:  Mental Status Assessment:  Appearance:   Appropriate   Eye Contact:   Good   Psychomotor Behavior: Normal   Attitude:   Cooperative   Orientation:   All  Speech   Rate / Production: Normal/ Responsive   Volume:  Normal   Mood:    Anxious  Depressed   Affect:    Tearful  Thought Content:  Clear   Thought Form:  Logical   Insight:    Good       Safety Issues and Plan for Safety and Risk Management:     [place] Suicide Severity Rating Scale (Lifetime/Recent)  [place] Suicide Severity  "Rating (Lifetime/Recent) 5/5/2020 6/11/2020   1. Wish to be Dead (Lifetime) Yes Yes   Wish to be Dead Description (Lifetime) \"When I was going through a couple of  years of counseling from a dysfunctional family about 30 years ago or more\" when patient was in treatment and there were family concerns   1. Wish to be Dead (Recent) No No   2. Non-Specific Active Suicidal Thoughts (Lifetime) Yes Yes   Non-Specific Active Suicidal Thought Description (Lifetime) Had thoughts of killing self -   2. Non-Specific Active Suicidal Thoughts (Recent) No No   3. Active Suicidal Ideation with any Methods (Not Plan) Without Intent to Act (Lifetime) Yes Yes   Active Suicidal Ideation with any Methods (Not Plan) Description (Lifetime) Took many pills of Prozac and was sent to the ED 30 years prior   3. Active Suicidal Ideation with any Methods (Not Plan) Without Intent to Act (Recent) No -   4. Active Suicidal Ideation with Some Intent to Act, Without Specific Plan (Lifetime) Yes Yes   Active Suicidal Ideation with Some Intent to Act, Without Specific Plan Description (Lifetime) Took many pills of Prozac and was sent to the ED -   4. Active Suicidal Ideation with Some Intent to Act, Without Specific Plan (Recent) No -   5. Active Suicidal Ideation with Specific Plan and Intent (Lifetime) Yes Yes   Active Suicidal Ideation with Specific Plan and Intent Description (Lifetime) Took many pills of Prozac and was sent to the ED over 30 years ago -   5. Active Suicidal Ideation with Specific Plan and Intent (Recent) No -   Most Severe Ideation Rating (Lifetime) 5 5   Most Severe Ideation Description (Lifetime) 35 years ago \"I just wanted to check out , I had thought of it so much and wanted to be done\" when family problems were happening   Frequency (Lifetime) 4 (No Data)   Comments - patient could not describe frequency   Duration (Lifetime) 2 (No Data)   Comments - patient could not describe duration   Controllability (Lifetime) 3 0 "   Protective Factors  (Lifetime) 2 5   Reasons for Ideation (Lifetime) 5 (No Data)   Comments - patient could not describe   Most Severe Ideation Rating (Past Month) NA -   Frequency (Past Month) NA -   Duration (Past Month) NA -   Controllability (Past Month) NA -   Protective Factors (Past Month) NA -   Reasons for Ideation (Past Month) NA -   Actual Attempt (Lifetime) Yes Yes   Actual Attempt Description (Lifetime) Took a bunch of pills patient reported overdosing on Prozac about thirty years ago   Total Number of Actual Attempts (Lifetime) 1 1   Actual Attempt (Past 3 Months) No No   Has subject engaged in non-suicidal self-injurious behavior? (Lifetime) Yes Yes   Has subject engaged in non-suicidal self-injurious behavior? (Past 3 Months) No No   Interrupted Attempts (Lifetime) No No   Interrupted Attempts (Past 3 Months) No No   Aborted or Self-Interrupted Attempt (Lifetime) No No   Total Number Aborted or Self Interrupted Attempts (Lifetime) 0 -   Aborted or Self-Interrupted Attempt (Past 3 Months) No No   Preparatory Acts or Behavior (Lifetime) No No   Preparatory Acts or Behavior (Past 3 Months) No No   Most Recent Attempt Date 30764 (No Data)   Comments - 30 years prior   Most Recent Attempt Actual Lethality Code 2 0   Comments This is a guess/pt. doesn't recall exact month or day -   Most Lethal Attempt Actual Lethality Code 2 -   Comments only 1 attempt/same attempt as most recent & initial -   Initial/First Attempt Date 28906 -   Initial/First Attempt Actual Lethality Code 2 -     Patient denies current fears or concerns for personal safety.  Patient denies current or recent suicidal ideation or behaviors.  Patient denies current or recent homicidal ideation or behaviors.  Patient denies current or recent self injurious behavior or ideation.  Patient denies other safety concerns.  Recommended that patient call 911 or go to the local ED should there be a change in any of these risk  factors.       Diagnostic Criteria:  F33.1:  A. Five (or more) symptoms have been present during the same 2-week period and represent a change from previous functioning; at least one of the symptoms is either (1) depressed mood or (2) loss of interest or pleasure.   1. Depressed mood.   2. Diminished interest or pleasure in all, or almost all, activities.   3. Significant appetite change.  4. Significant sleep change.   5. Fatigue or loss of energy.   6. Feelings of worthlessness or inappropriate guilt.   7. Diminished ability to think or concentrate, or indecisiveness.   8. Psychomotor agitation or lethargy.   9. Recurrent thoughts of death.   B. The symptoms cause clinically significant distress or impairment in social, occupational, or other important areas of functioning.  C. The episode is not attributable to the physiological effects of a substance or to another medical condition.  D. The occurence of major depressive episode is not better explained by other thought / psychotic disorders.  E. There has never been a manic episode or hypomanic episode.     F41.1:  A. Excessive anxiety and worry, occurring more days than not for at least 6 months about a number of events or activities.   B. The individual finds it difficult to control the worry.  C. The anxiety and worry are associated with 3 or more of 6 symptoms.  D. The anxiety, worry, or physical symptoms cause clinically significant distress or impairment in social, occupational, or other important areas of functioning.  E. The disturbance is not attributable to the physiological effects of a substance (e.g., a drug of abuse, a medication) or another medical condition (e.g., hyperthyroidism).  F. The disturbance is not better explained by another mental disorder (e.g., anxiety or worry about having panic attacks in panic disorder, negative evaluation in social anxiety disorder [social phobia], contamination or other obsessions in obsessive-compulsive  disorder, separation from attachment figures in separation anxiety disorder, reminders of traumatic events in posttraumatic stress disorder, gaining weight in anorexia nervosa, physical complaints in somatic symptom disorder, perceived appearance flaws in body dysmorphic disorder, having a serious illness in illness anxiety disorder, or the content of delusional beliefs in schizophrenia or delusional disorder).      DSM5 Diagnoses: (Sustained by DSM5 Criteria Listed Above)  Diagnoses:   1. Major depressive disorder, recurrent episode, moderate (H)    2. CHAVO (generalized anxiety disorder)    3. Bipolar 2 disorder (H)    Rule out bipolar disorder  Rule out panic disorder  Psychosocial & Contextual Factors: lack of social support, recent major surgeries, attending individual psychotherapy, addiction history   WHODAS 2.0 (12 item):   WHODAS 2.0 Total Score 6/17/2021 1/18/2022   Total Score 41 44   Total Score MyChart 41 44       Intervention:              Reviewed symptoms and history of presenting concern. A diagnostic intake was recently completed on 6/24/2020 with [facility]. Therefore, a DA will not be completed as a component of the current assessment.   CBT: socratic questioning, positive reinforcement, thought challenging, cognitive reframe  EFT: empathetic attunement, emotion checking, emotion modeling, emotion naming  MI: open ended questions, affirmations, reflections        Attendance Agreement:  Client has not signed the attendance agreement. Discussed expectations at beginning of this first session and patient agreed.       PLAN:    Provider will continue Diagnostic Assessment in next session. Patient will complete MMPI-2 prior to next session (1/26/2022).    Medical necessity criteria is warranted in order to: Measure a psychological disorder and its severity and functional impairment to determine psychiatric diagnosis when a mental illness is suspected, or to achieve a differential diagnosis  from a range of medical/psychological disorders that present with similar constellations of symptoms (e.g., determination and measurement of anxiety severity and impact in the presence of ongoing asthma or heart disease) and Perform symptom measurement to objectively measure treatment effectiveness and/or determine the need to refer for pharmacological treatment or other medical evaluation (e.g., based on severity and chronicity of symptoms)    Medical necessity for psychological assessment is warranted as a result of the following: (1) A specific clinical question is posed that relates to the condition/symptoms being addressed (2) The question cannot be adequately addressed by clinical interview and/or behavioral observation (3) Results of psychological testing are reasonably expected to provide an answer to the query (4) It is reasonably expected that the testing will provide information leading to a clearer diagnosis and/or guide treatment planning with an expectation of improved clinical outcome.    I acknowledge that, based upon current clinical information, the patient and I have reviewed and discussed issues pertaining to the purpose of therapy/testing, potential therapeutic goals, procedures, risks and benefits and estimated duration of therapy/testing. Issues pertaining to fees and confidentiality were also addressed with the patient indicated understanding. The patient was informed that their symptoms may change during the course of assessment, for better or worse, and this may impact the clinical approach and/or the duration of treatment; the patient understands that it is imperative that I am kept informed of the changes when they occur. I will not be providing any experimental procedures and, if we agree that a change in clinical procedure would be more beneficial, I will obtain specific consent for that procedure or refer you to another provider who has expertise in that area.       Ivy Wagner,  Karina, CLINT  Clinical Psychologist

## 2022-01-20 ENCOUNTER — VIRTUAL VISIT (OUTPATIENT)
Dept: PALLIATIVE MEDICINE | Facility: OTHER | Age: 67
End: 2022-01-20
Payer: MEDICARE

## 2022-01-20 DIAGNOSIS — M54.12 CERVICAL RADICULOPATHY: Primary | ICD-10-CM

## 2022-01-20 PROCEDURE — 99213 OFFICE O/P EST LOW 20 MIN: CPT | Mod: 95 | Performed by: ANESTHESIOLOGY

## 2022-01-20 PROCEDURE — G0463 HOSPITAL OUTPT CLINIC VISIT: HCPCS | Mod: PN,RTG | Performed by: ANESTHESIOLOGY

## 2022-01-20 NOTE — PROGRESS NOTES
Patient presents to the clinic today for a follow up with AXEL WIGGINS MD  regarding Pain Management.     Winnie is a 66 year old who is being evaluated via a billable video visit.      How would you like to obtain your AVS? MyChart  If the video visit is dropped, the invitation should be resent by: Text to cell phone: [phone]  Will anyone else be joining your video visit? No      Video Start Time:   Video-Visit Details    Type of service:  Video Visit    Video End Time:    Originating Location (pt. Location):    Distant Location (provider location):  [facility]     Platform used for Video Visit: Tara         Is Pt currently in [place]? Yes    NOTE:  If Pt is not in [place], Appointment needs to be canceled and rescheduled         PEG Score 1/20/2022   PEG Total Score 9          UDT/CSA- 12/17/2021    QUESTIONS:    Cyndy Botello MA  [facility]

## 2022-01-20 NOTE — PATIENT INSTRUCTIONS
PLAN:  Reviewed you are working with another psychotherapist to get some supports.    You will obtain the lithium orotate 10 mg, try 2 tablets at bedtime to see if helpful for mood symptoms.    Continue with the oxcarbazepine 150 mg 1 tablet at bedtime.    Follow-up Dr. Dubois in 4 weeks

## 2022-01-20 NOTE — PROGRESS NOTES
"Winnie is a 66 year old who is being evaluated via a billable video visit.      How would you like to obtain your AVS?   If the video visit is dropped, the invitation should be resent by:   Will anyone else be joining your video visit?     Video Start Time:         Subjective   Winnie is a 66 year old who presents for the following health issues   Including mood disorder.      HPI   She reviews she did have her cervical surgery 12/21.  Describes she was expecting to go to transitional care unit afterwards but there were none available.  Also notes on going home there was a delay getting a hospital bed for 2 weeks.  Reviews it has been painful at home.  Was using Hydro codon 2 tablets every 4 hours, has gradually decreased to using 6-8 doses a day.  She wears a brace when she is laying back in her bed can be okay, though the brace comes up around her neck incisions.  However if she is sitting up it is uncomfortable.  She really goes in a car though it is jostling when she does.    Physical therapy is coming to her house.    She reviews challenges at home, her  is gone for work from 6 in the morning 5:30 at night, then tends to go to bed early.  There are no help with family or friends.  Isolating has been challenging with her mental health.    She has been speaking with her therapist Dlalas Samson weekly.  Describes yesterday having evaluation with a psychologist, it appears she did have an intake with the Dr. Ivy Wagner.    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    She had run out of her lithium orotate just before her surgery and has not obtained it.  She notes would have to wait till the weekend where her  could do errands.  She would be curious about trying it again.  Has been using oxcarbazepine 1/2 tablet at bedtime.  She acknowledges she is feeling \"overwhelmed\" was not able to get refill.    Describes challenges at home, avoiding difficult conversations.    She has been using medical cannabis " occasionally, seems to help when she gets angry.      Current Outpatient Medications:      acetaminophen (TYLENOL) 325 MG tablet, Take 2 tablets (650 mg) by mouth every 4 hours as needed for other (For optimal non-opioid multimodal pain management to improve pain control.), Disp: , Rfl:      albuterol (PROVENTIL) (2.5 MG/3ML) 0.083% neb solution, Take 1 vial (2.5 mg) by nebulization every 4 hours as needed for shortness of breath / dyspnea or wheezing, Disp: 360 mL, Rfl: 5     albuterol (VENTOLIN HFA) 108 (90 Base) MCG/ACT inhaler, Inhale 2 puffs into the lungs every 6 hours, Disp: 18 g, Rfl: 11     Cholecalciferol (VITAMIN D3) 250 MCG (47109 UT) TABS, Take 1 tablet by mouth daily 57270/day, Disp: , Rfl:      Cyanocobalamin (VITAMIN B-12) 5000 MCG SUBL, Place 2-3 sprays under the tongue daily Unknown dose. 2 or 3 sprays/day, Disp: , Rfl:      ethacrynic acid (EDECRIN) 25 MG tablet, Take 1 tablet by mouth on Saturday and Wednesday, Disp: 30 tablet, Rfl: 11     Fluticasone-Umeclidin-Vilanterol (TRELEGY ELLIPTA) 200-62.5-25 MCG/INH oral inhaler, Inhale 1 puff into the lungs daily, Disp: 1 each, Rfl: 11     HYDROcodone-acetaminophen (NORCO) 5-325 MG tablet, Take 1-2 tablets by mouth every 6 hours as needed for moderate to severe pain Try and stretch the interval between doses as able or decrease dose to 1 tab every 4-6 hours, Disp: 42 tablet, Rfl: 0     hydrOXYzine (ATARAX) 25 MG tablet, Take 1 tablet (25 mg) by mouth every 6 hours as needed for anxiety, Disp: 30 tablet, Rfl: 0     Lidocaine (LIDOCARE) 4 % Patch, Place 2 patches onto the skin every 24 hours To prevent lidocaine toxicity, patient should be patch free for 12 hrs daily., Disp: 15 patch, Rfl: 0     losartan (COZAAR) 25 MG tablet, Take 1 tablet (25 mg) by mouth daily, Disp: 90 tablet, Rfl: 2     magnesium 250 MG tablet, Take 1 tablet by mouth 2 times daily, Disp: , Rfl:      medical cannabis (Patient's own supply), See Admin Instructions (The purpose of this  order is to document that the patient reports taking medical cannabis.  This is not a prescription, and is not used to certify that the patient has a qualifying medical condition.), Disp: , Rfl:      methocarbamol (ROBAXIN) 500 MG tablet, Take 1 tablet (500 mg) by mouth 4 times daily as needed for muscle spasms, Disp: 40 tablet, Rfl: 0     Multiple Vitamins-Iron (MULTIVITAMIN PLUS IRON ADULT) TABS, Take 4 tablets by mouth daily, Disp: 120 tablet, Rfl: 0     omeprazole (PRILOSEC) 20 MG DR capsule, Take 20 mg by mouth daily , Disp: , Rfl:      OXcarbazepine (TRILEPTAL) 150 MG tablet, One-half tab bedtime 3 nights, one-half tab twice a day 5 days, one twice a day (Patient taking differently: Take 75 mg by mouth daily ), Disp: 30 tablet, Rfl: 3     potassium chloride ER (KLOR-CON M) 20 MEQ CR tablet, Take 1 tablet (20 mEq) by mouth daily, Disp: 90 tablet, Rfl: 3     PREBIOTIC PRODUCT PO, Take by mouth daily , Disp: , Rfl:      rOPINIRole (REQUIP) 2 MG tablet, Take 2-4 mg by mouth At Bedtime , Disp: , Rfl:      SYNTHROID 150 MCG tablet, Take 1 tablet (150 mcg) by mouth daily, Disp: 90 tablet, Rfl: 4     Turmeric Curcumin 500 MG CAPS, Take 500 mg by mouth daily , Disp: , Rfl:      vitamin (B COMPLEX-C) tablet, Take 1 tablet by mouth daily, Disp: , Rfl:      vitamin E 400 units TABS, Take 800 Units by mouth daily, Disp: , Rfl:      polyethylene glycol (MIRALAX) 17 g packet, Take 17 g by mouth daily (Patient not taking: Reported on 1/20/2022), Disp: , Rfl:   She is alert with a clear sensorium no respiratory distress.  Affect is constricted.  Speech without press.  Does not psychomotor agitated more retarded.      Review of Systems         Objective           Vitals:  No vitals were obtained today due to virtual visit.    Physical Exam       Assessment: Patient has had a history of bipolar spectrum disorder, very sensitive to a variety of medication regimen, having serotonin syndrome in the past.  We have been using  approaches such as lithium orotate.  Notes she has not been maintaining it since coming home and there are challenges with her support system.    She does see her psychotherapist.    Stressors include social isolation.    Plan as above.    Total time more than 20 minutes            Video-Visit Details    Type of service:  Video Visit    Video End Time:    Originating Location (pt. Location):     Distant Location (provider location):  [facility]     Platform used for Video Visit:

## 2022-01-20 NOTE — LETTER
1/20/2022         RE: Randi Cleary  [address] [place] [facility] [address]        Dear Colleague,    Thank you for referring your patient, Randi Cleary, to the [facility]. Please see a copy of my visit note below.    Patient presents to the clinic today for a follow up with AXEL WIGGINS MD  regarding Pain Management.     Winnie is a 66 year old who is being evaluated via a billable video visit.      How would you like to obtain your AVS? MyChart  If the video visit is dropped, the invitation should be resent by: Text to cell phone: [phone]  Will anyone else be joining your video visit? No      Video Start Time:   Video-Visit Details    Type of service:  Video Visit    Video End Time:    Originating Location (pt. Location):    Distant Location (provider location):  [facility]     Platform used for Video Visit: AmWell         Is Pt currently in [place]? Yes    NOTE:  If Pt is not in [place], Appointment needs to be canceled and rescheduled         PEG Score 1/20/2022   PEG Total Score 9          UDT/CSA- 12/17/2021    QUESTIONS:    Cyndy Botello MA  [facility]     Winnie is a 66 year old who is being evaluated via a billable video visit.      How would you like to obtain your AVS?   If the video visit is dropped, the invitation should be resent by:   Will anyone else be joining your video visit?     Video Start Time:         Subjective   Winnie is a 66 year old who presents for the following health issues   Including mood disorder.      HPI   She reviews she did have her cervical surgery 12/21.  Describes she was expecting to go to transitional care unit afterwards but there were none available.  Also notes on going home there was a delay getting a hospital bed for 2 weeks.  Reviews it has been painful at home.  Was using Hydro codon 2 tablets every 4 hours, has gradually decreased to using 6-8 doses a day.  She wears a  "brace when she is laying back in her bed can be okay, though the brace comes up around her neck incisions.  However if she is sitting up it is uncomfortable.  She really goes in a car though it is jostling when she does.    Physical therapy is coming to her house.    She reviews challenges at home, her  is gone for work from 6 in the morning 5:30 at night, then tends to go to bed early.  There are no help with family or friends.  Isolating has been challenging with her mental health.    She has been speaking with her therapist Dallas Samson weekly.  Describes yesterday having evaluation with a psychologist, it appears she did have an intake with the Dr. Ivy Wagner.    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    She had run out of her lithium orotate just before her surgery and has not obtained it.  She notes would have to wait till the weekend where her  could do errands.  She would be curious about trying it again.  Has been using oxcarbazepine 1/2 tablet at bedtime.  She acknowledges she is feeling \"overwhelmed\" was not able to get refill.    Describes challenges at home, avoiding difficult conversations.    She has been using medical cannabis occasionally, seems to help when she gets angry.      Current Outpatient Medications:      acetaminophen (TYLENOL) 325 MG tablet, Take 2 tablets (650 mg) by mouth every 4 hours as needed for other (For optimal non-opioid multimodal pain management to improve pain control.), Disp: , Rfl:      albuterol (PROVENTIL) (2.5 MG/3ML) 0.083% neb solution, Take 1 vial (2.5 mg) by nebulization every 4 hours as needed for shortness of breath / dyspnea or wheezing, Disp: 360 mL, Rfl: 5     albuterol (VENTOLIN HFA) 108 (90 Base) MCG/ACT inhaler, Inhale 2 puffs into the lungs every 6 hours, Disp: 18 g, Rfl: 11     Cholecalciferol (VITAMIN D3) 250 MCG (70177 UT) TABS, Take 1 tablet by mouth daily 41247/day, Disp: , Rfl:      Cyanocobalamin (VITAMIN B-12) 5000 MCG SUBL, Place " 2-3 sprays under the tongue daily Unknown dose. 2 or 3 sprays/day, Disp: , Rfl:      ethacrynic acid (EDECRIN) 25 MG tablet, Take 1 tablet by mouth on Saturday and Wednesday, Disp: 30 tablet, Rfl: 11     Fluticasone-Umeclidin-Vilanterol (TRELEGY ELLIPTA) 200-62.5-25 MCG/INH oral inhaler, Inhale 1 puff into the lungs daily, Disp: 1 each, Rfl: 11     HYDROcodone-acetaminophen (NORCO) 5-325 MG tablet, Take 1-2 tablets by mouth every 6 hours as needed for moderate to severe pain Try and stretch the interval between doses as able or decrease dose to 1 tab every 4-6 hours, Disp: 42 tablet, Rfl: 0     hydrOXYzine (ATARAX) 25 MG tablet, Take 1 tablet (25 mg) by mouth every 6 hours as needed for anxiety, Disp: 30 tablet, Rfl: 0     Lidocaine (LIDOCARE) 4 % Patch, Place 2 patches onto the skin every 24 hours To prevent lidocaine toxicity, patient should be patch free for 12 hrs daily., Disp: 15 patch, Rfl: 0     losartan (COZAAR) 25 MG tablet, Take 1 tablet (25 mg) by mouth daily, Disp: 90 tablet, Rfl: 2     magnesium 250 MG tablet, Take 1 tablet by mouth 2 times daily, Disp: , Rfl:      medical cannabis (Patient's own supply), See Admin Instructions (The purpose of this order is to document that the patient reports taking medical cannabis.  This is not a prescription, and is not used to certify that the patient has a qualifying medical condition.), Disp: , Rfl:      methocarbamol (ROBAXIN) 500 MG tablet, Take 1 tablet (500 mg) by mouth 4 times daily as needed for muscle spasms, Disp: 40 tablet, Rfl: 0     Multiple Vitamins-Iron (MULTIVITAMIN PLUS IRON ADULT) TABS, Take 4 tablets by mouth daily, Disp: 120 tablet, Rfl: 0     omeprazole (PRILOSEC) 20 MG DR capsule, Take 20 mg by mouth daily , Disp: , Rfl:      OXcarbazepine (TRILEPTAL) 150 MG tablet, One-half tab bedtime 3 nights, one-half tab twice a day 5 days, one twice a day (Patient taking differently: Take 75 mg by mouth daily ), Disp: 30 tablet, Rfl: 3     potassium  chloride ER (KLOR-CON M) 20 MEQ CR tablet, Take 1 tablet (20 mEq) by mouth daily, Disp: 90 tablet, Rfl: 3     PREBIOTIC PRODUCT PO, Take by mouth daily , Disp: , Rfl:      rOPINIRole (REQUIP) 2 MG tablet, Take 2-4 mg by mouth At Bedtime , Disp: , Rfl:      SYNTHROID 150 MCG tablet, Take 1 tablet (150 mcg) by mouth daily, Disp: 90 tablet, Rfl: 4     Turmeric Curcumin 500 MG CAPS, Take 500 mg by mouth daily , Disp: , Rfl:      vitamin (B COMPLEX-C) tablet, Take 1 tablet by mouth daily, Disp: , Rfl:      vitamin E 400 units TABS, Take 800 Units by mouth daily, Disp: , Rfl:      polyethylene glycol (MIRALAX) 17 g packet, Take 17 g by mouth daily (Patient not taking: Reported on 1/20/2022), Disp: , Rfl:   She is alert with a clear sensorium no respiratory distress.  Affect is constricted.  Speech without press.  Does not psychomotor agitated more retarded.      Review of Systems         Objective           Vitals:  No vitals were obtained today due to virtual visit.    Physical Exam       Assessment: Patient has had a history of bipolar spectrum disorder, very sensitive to a variety of medication regimen, having serotonin syndrome in the past.  We have been using approaches such as lithium orotate.  Notes she has not been maintaining it since coming home and there are challenges with her support system.    She does see her psychotherapist.    Stressors include social isolation.    Plan as above.    Total time more than 20 minutes            Video-Visit Details    Type of service:  Video Visit    Video End Time:    Originating Location (pt. Location):     Distant Location (provider location):  [facility]     Platform used for Video Visit:       Again, thank you for allowing me to participate in the care of your patient.        Sincerely,        AXEL WIGGINS MD

## 2022-01-21 ENCOUNTER — VIRTUAL VISIT (OUTPATIENT)
Dept: PSYCHOLOGY | Facility: CLINIC | Age: 67
End: 2022-01-21
Payer: MEDICARE

## 2022-01-21 DIAGNOSIS — F41.1 GAD (GENERALIZED ANXIETY DISORDER): ICD-10-CM

## 2022-01-21 DIAGNOSIS — F31.81 BIPOLAR 2 DISORDER (H): Primary | ICD-10-CM

## 2022-01-21 PROCEDURE — 90834 PSYTX W PT 45 MINUTES: CPT | Mod: 95 | Performed by: SOCIAL WORKER

## 2022-01-21 ASSESSMENT — PATIENT HEALTH QUESTIONNAIRE - PHQ9
SUM OF ALL RESPONSES TO PHQ QUESTIONS 1-9: 20
10. IF YOU CHECKED OFF ANY PROBLEMS, HOW DIFFICULT HAVE THESE PROBLEMS MADE IT FOR YOU TO DO YOUR WORK, TAKE CARE OF THINGS AT HOME, OR GET ALONG WITH OTHER PEOPLE: VERY DIFFICULT
SUM OF ALL RESPONSES TO PHQ QUESTIONS 1-9: 20

## 2022-01-21 NOTE — PROGRESS NOTES
Progress Note    Patient Name: Randi Cleary  Date: 1/21/22         Service Type: Individual      Session Start Time: 11:00 AM  Session End Time: 11:52 AM     Session Length: 52 minutes     Session #: 81    Attendees: Client attended alone    Service Modality:  Video Visit:    Telemedicine Visit: The patient's condition can be safely assessed and treated via synchronous audio and visual telemedicine encounter.      Reason for Telemedicine Visit: Services only offered telehealth    Originating Site (Patient Location): Patient's home    Distant Site (Provider Location): Provider Remote Setting- Home Office    Consent:  The patient/guardian has verbally consented to: the potential risks and benefits of telemedicine (video visit) versus in person care; bill my insurance or make self-payment for services provided; and responsibility for payment of non-covered services.     Mode of Communication:  Video Conference via [employer]    As the provider I attest to compliance with applicable laws and regulations related to telemedicine.      Treatment Plan Last Reviewed: 12/10/21  PHQ-9 / CHAVO-7 :   PHQ 1/17/2022 1/18/2022 1/21/2022   PHQ-9 Total Score 20 19 20   Q9: Thoughts of better off dead/self-harm past 2 weeks Several days Several days Several days   F/U: Thoughts of suicide or self-harm Yes No Yes   F/U: Self harm-plan No - No   F/U: Self-harm action No - No   F/U: Safety concerns No No No   Some encounter information is confidential and restricted. Go to Review Flowsheets activity to see all data.     CHAVO-7 SCORE 1/14/2022 1/14/2022 1/14/2022   Total Score - - 18 (severe anxiety)   Total Score 18 18 18   Some encounter information is confidential and restricted. Go to Review Flowsheets activity to see all data.         DATA  Extended Session (53+ minutes): No  Interactive Complexity: No  Crisis: No      Progress Since Last Session (Related to Symptoms / Goals /  Homework):   Symptoms: Improving some more bright spots in her day     Homework: Achieved / completed to satisfaction  Continue to check in with this part of you that feels lonely- consider writing emails as diary entries about this -done     Episode of Care Goals: Minimal progress - ACTION (Actively working towards change); Intervened by reinforcing change plan / affirming steps taken     Current / Ongoing Stressors and Concerns:   Patient is currently socially isolated. She has a conflictual relationship with her .  She is getting minimal physical activity.  She had recent eye surgery     Treatment Objective(s) Addressed in This Session:   Patient will increase frequency of engaging her in ADLs.  Patient will track and record at least 5 pleasant exchanges with . Patient will be able to identify at least 5 positive traits about her .  Patient will reduce level of depressive and anxious features as evidenced by reduction in score on her CHAVO-7 and PHQ-9 (scores of 15 and 16 at first measurment, respectively).     Intervention:   Supportive Therapy: Discussed her frustration with not getting as much help, but reported she did finally have someone come over to help her bathe. Talked about the psychological evaluation that was started and how this went. Discussed her relationship with her  and how she's taking some space from him. Also talked about her relationship with friends and reaching out to connect with them. Reviewed homework and identified successes and barriers to completion.  Talked about continuing to journal.     ASSESSMENT: Current Emotional / Mental Status (status of significant symptoms):   Risk status (Self / Other harm or suicidal ideation)   Patient denies current fears or concerns for personal safety.   Patient reports the following current or recent suicidal ideation or behaviors: passive ideation.    Patient denies current or recent homicidal ideation or  behaviors.   Patient denies current or recent self injurious behavior or ideation.   Patient denies other safety concerns.   Patient reports there has been no change in risk factors since their last session.     Patient reports there has been no change in protective factors since their last session.     A safety and risk management plan has been developed including: Patient consented to co-developed safety plan.  Safety and risk management plan was completed.  Patient agreed to use safety plan should any safety concerns arise.  A copy was given to the patient at a past session, on 11/24/2020, and a copy is attached to the bottom of this note.      Appearance:   Appropriate    Eye Contact:   Fair    Psychomotor Behavior: Normal    Attitude:   Cooperative    Orientation:   All   Speech    Rate / Production: Normal/ Responsive    Volume:  Normal    Mood:    content, moments of hopefulness   Affect:    typically bright, with some moments of worry    Thought Content:  Clear    Thought Form:  Coherent    Insight:    Fair      Medication Review:   No changes to current psychiatric medication(s)      Medication Compliance:   Yes     Changes in Health Issues:   None reported     Chemical Use Review:   Substance Use: decrease in use.  Patient reports frequency of use none since the third week of August.  Provided encouragement towards sobriety        Tobacco Use: No current tobacco use.      Diagnosis:  1. Bipolar 2 disorder (H)    2. CHAVO (generalized anxiety disorder)        Collateral Reports Completed:   Not Applicable    PLAN: (Patient Tasks / Therapist Tasks / Other)  Continue MICAELA Poon    January 21, 2022                                                         ______________________________________________________________________    Treatment Plan    Patient's Name: Randi Cleary  YOB: 1955    Date: 12/10/21    DSM5 Diagnoses: 296.32 (F33.1) Major Depressive Disorder,  "Recurrent Episode, Moderate With anxious distress  Psychosocial / Contextual Factors: Patient's entire family of origin has , she now has a sister-in-law and  as support.  Relationship with  is conflictual.  Patient is reporting increase in physical symptoms due to COVID-19.  Patient is off of pain medications.  WHODAS: 42    Referral / Collaboration:  Referral to another professional/service is not indicated at this time.     Anticipated number of session or this episode of care: 12-15      MeasurableTreatment Goal(s) related to diagnosis / functional impairment(s)  Goal 1: Patient will \"jumpstart, getting going with the things I need to be doing around the house as far as picking up, doing things, trying to do something every day.  Also to lessen the animosity between me and my .\"    I will know I've met my goal when my shoulders are fixed and I can see.      Objective #A (Patient Action)    Patient will increase frequency of engaging her in ADLs.  Status: Continued - Date(s): 12/10/21    Intervention(s)  Therapist will engage patient in CBT, specifically behavioral activation.    Objective #B  Patient will track and record at least 5 pleasant exchanges with . Patient will be able to identify at least 5 positive traits about her  and how he relates to her.  Status: Continued - Date(s): 12/10/21    Intervention(s)  Therapist will teach assertiveness skills and assign homework related to relationship interactions.    Objective #C  Patient will reduce level of depressive and anxious features as evidenced by reduction in score on her CHAVO-7 and PHQ-9 (scores of 15 and 16 at first measurment, respectively).  Status: Continued - Date(s):  12/10/21    Intervention(s)  Therapist will engage patient in person-centered therapy and CBT.    Patient has reviewed and agreed to the above plan.      MICAELA SLADE  December 10, 2021                                                Randi RIVERA" "Cathy Cleary          SAFETY PLAN:  Step 1: Warning signs / cues (Thoughts, images, mood, situation, behavior) that a crisis may be developing:  ? Thoughts: \"I don't want to continue\" \"I am unwanted\"  ? Images: none  ? Thinking Processes: ruminating  ? Mood: anger  ? Behaviors: isolating/withdrawing , can't stop crying, not taking care of myself and not taking care of my responsibilities  ? Situations: small triggers, such as not being able to find something, or dropping something   Step 2: Coping strategies - Things I can do to take my mind off of my problems without contacting another person (relaxation technique, physical activity):  ? Distress Tolerance Strategies:  arts and crafts: drawing, play with my pet , listen to positive and upbeat music: any, change body temperature (ice pack/cold water)  and paced breathing/progressive muscle relaxation  ? Physical Activities: go for a walk, deep breathing and stretching   ? Focus on helpful thoughts:  \"You've been through this before, you can get through it again.\"  Step 3: People and social settings that provide distraction:                 Name: Carmen                            Name: Darien                           Name: Aleida       ? pool, shopping, Carmen's house, [employer]       Step 4: Remind myself of people and things that are important to me and worth living for:  Clifford Little Donna, post-COVID world, options of what could be in your future        Step 5: When I am in crisis, I can ask these people to help me use my safety plan:                 Name: Sidney  Step 6: Making the environment safe:   ? go to sleep/daydream  Step 7: Professionals or agencies I can contact during a crisis:  ? [facility] Daytime Number: [phone]  ? Suicide Prevention Lifeline: 7-439-582-TALK (6033)  ? Crisis Text Line Service (available 24 hours a day, 7 days a week): Text [place] to 173869    Local Crisis Services: [facility] Crisis: [phone]     Call 911 or go " to my nearest emergency department.       I helped develop this safety plan and agree to use it when needed.  I have been given a copy of this plan.       Client signature _________________________________________________________________  Today s date:  11/24/2020  Adapted from Safety Plan Template 2008 Randi Poole and Robby Barba is reprinted with the express permission of the authors.  No portion of the Safety Plan Template may be reproduced without the express, written permission.  You can contact the authors at [email or web address] or [email or web address].

## 2022-01-22 ASSESSMENT — PATIENT HEALTH QUESTIONNAIRE - PHQ9: SUM OF ALL RESPONSES TO PHQ QUESTIONS 1-9: 20

## 2022-01-25 ENCOUNTER — TELEPHONE (OUTPATIENT)
Dept: NEUROSURGERY | Facility: CLINIC | Age: 67
End: 2022-01-25
Payer: MEDICARE

## 2022-01-25 ENCOUNTER — VIRTUAL VISIT (OUTPATIENT)
Dept: PSYCHOLOGY | Facility: CLINIC | Age: 67
End: 2022-01-25
Payer: MEDICARE

## 2022-01-25 DIAGNOSIS — G95.20 CORD COMPRESSION (H): Chronic | ICD-10-CM

## 2022-01-25 DIAGNOSIS — M54.12 CERVICAL RADICULOPATHY: Primary | ICD-10-CM

## 2022-01-25 DIAGNOSIS — F31.81 BIPOLAR 2 DISORDER (H): Primary | ICD-10-CM

## 2022-01-25 DIAGNOSIS — F41.1 GAD (GENERALIZED ANXIETY DISORDER): ICD-10-CM

## 2022-01-25 PROCEDURE — 90834 PSYTX W PT 45 MINUTES: CPT | Mod: 95 | Performed by: SOCIAL WORKER

## 2022-01-25 RX ORDER — HYDROCODONE BITARTRATE AND ACETAMINOPHEN 5; 325 MG/1; MG/1
1 TABLET ORAL 3 TIMES DAILY PRN
Qty: 21 TABLET | Refills: 0 | Status: SHIPPED | OUTPATIENT
Start: 2022-01-25 | End: 2022-02-02

## 2022-01-25 RX ORDER — METHOCARBAMOL 500 MG/1
500 TABLET, FILM COATED ORAL 4 TIMES DAILY PRN
Qty: 40 TABLET | Refills: 0 | Status: SHIPPED | OUTPATIENT
Start: 2022-01-25 | End: 2022-02-10

## 2022-01-25 ASSESSMENT — PATIENT HEALTH QUESTIONNAIRE - PHQ9
SUM OF ALL RESPONSES TO PHQ QUESTIONS 1-9: 15
SUM OF ALL RESPONSES TO PHQ QUESTIONS 1-9: 15
10. IF YOU CHECKED OFF ANY PROBLEMS, HOW DIFFICULT HAVE THESE PROBLEMS MADE IT FOR YOU TO DO YOUR WORK, TAKE CARE OF THINGS AT HOME, OR GET ALONG WITH OTHER PEOPLE: VERY DIFFICULT
SUM OF ALL RESPONSES TO PHQ QUESTIONS 1-9: 15
SUM OF ALL RESPONSES TO PHQ QUESTIONS 1-9: 15
10. IF YOU CHECKED OFF ANY PROBLEMS, HOW DIFFICULT HAVE THESE PROBLEMS MADE IT FOR YOU TO DO YOUR WORK, TAKE CARE OF THINGS AT HOME, OR GET ALONG WITH OTHER PEOPLE: VERY DIFFICULT

## 2022-01-25 NOTE — PROGRESS NOTES
Progress Note    Patient Name: Randi Cleary  Date: 1/25/22         Service Type: Individual      Session Start Time: 2:00 PM  Session End Time: 2:50 PM     Session Length: 50 minutes     Session #: 82    Attendees: Client attended alone    Service Modality:  Video Visit:    Telemedicine Visit: The patient's condition can be safely assessed and treated via synchronous audio and visual telemedicine encounter.      Reason for Telemedicine Visit: Services only offered telehealth    Originating Site (Patient Location): Patient's home    Distant Site (Provider Location): Provider Remote Setting- Home Office    Consent:  The patient/guardian has verbally consented to: the potential risks and benefits of telemedicine (video visit) versus in person care; bill my insurance or make self-payment for services provided; and responsibility for payment of non-covered services.     Mode of Communication:  Video Conference via [employer]    As the provider I attest to compliance with applicable laws and regulations related to telemedicine.      Treatment Plan Last Reviewed: 12/10/21  PHQ-9 / CHAVO-7 :   PHQ 1/21/2022 1/25/2022 1/25/2022   PHQ-9 Total Score 20 15 15   Q9: Thoughts of better off dead/self-harm past 2 weeks Several days Not at all Not at all   F/U: Thoughts of suicide or self-harm Yes - -   F/U: Self harm-plan No - -   F/U: Self-harm action No - -   F/U: Safety concerns No - -   Some encounter information is confidential and restricted. Go to Review Flowsheets activity to see all data.     CHAVO-7 SCORE 1/14/2022 1/14/2022 1/14/2022   Total Score - - 18 (severe anxiety)   Total Score 18 18 18   Some encounter information is confidential and restricted. Go to Review Flowsheets activity to see all data.         DATA  Extended Session (53+ minutes): No  Interactive Complexity: No  Crisis: No      Progress Since Last Session (Related to Symptoms / Goals / Homework):   Symptoms:  Improving more upbeat, no suidical ideation     Homework: Achieved / completed to satisfaction  Continue journaling -done     Episode of Care Goals: Minimal progress - ACTION (Actively working towards change); Intervened by reinforcing change plan / affirming steps taken     Current / Ongoing Stressors and Concerns:   Patient is currently socially isolated. She has a conflictual relationship with her .  She is getting minimal physical activity.  She had recent eye surgery     Treatment Objective(s) Addressed in This Session:   Patient will increase frequency of engaging her in ADLs.  Patient will track and record at least 5 pleasant exchanges with . Patient will be able to identify at least 5 positive traits about her .  Patient will reduce level of depressive and anxious features as evidenced by reduction in score on her CHAVO-7 and PHQ-9 (scores of 15 and 16 at first measurment, respectively).     Intervention:   Supportive Therapy: Patient shared updates on relationships and supports in her life. Also talked about her ongoing recovery. Shared that she's noticed a correlation between her mood stability and reduction in taking her pain medication. Talked about this possibly a correlation, not a causation, but that she could talk to her prescribing provider about what she noticed. Discussed how the suicidal ideation went away and how hope has come with it. Discussed ways she's been managing her mental health and agreed that next session we'd move from supportive therapy into some IFS. Reviewed homework and identified successes and barriers to completion.    ASSESSMENT: Current Emotional / Mental Status (status of significant symptoms):   Risk status (Self / Other harm or suicidal ideation)   Patient denies current fears or concerns for personal safety.   Patient denies current or recent suicidal ideation or behaviors.   Patient denies current or recent homicidal ideation or behaviors.   Patient  denies current or recent self injurious behavior or ideation.   Patient denies other safety concerns.   Patient reports there has been no change in risk factors since their last session.     Patient reports there has been no change in protective factors since their last session.     A safety and risk management plan has been developed including: Patient consented to co-developed safety plan.  Safety and risk management plan was completed.  Patient agreed to use safety plan should any safety concerns arise.  A copy was given to the patient at a past session, on 11/24/2020, and a copy is attached to the bottom of this note.      Appearance:   Appropriate    Eye Contact:   Fair    Psychomotor Behavior: Normal    Attitude:   Cooperative    Orientation:   All   Speech    Rate / Production: Normal/ Responsive    Volume:  Normal    Mood:    content, moments of hopefulness   Affect:    Bright    Thought Content:  Clear    Thought Form:  Coherent    Insight:    Fair      Medication Review:   No changes to current psychiatric medication(s)      Medication Compliance:   Yes     Changes in Health Issues:   None reported     Chemical Use Review:   Substance Use: decrease in use.  Patient reports frequency of use none since the third week of August.  Provided encouragement towards sobriety        Tobacco Use: No current tobacco use.      Diagnosis:  1. Bipolar 2 disorder (H)    2. CHAVO (generalized anxiety disorder)        Collateral Reports Completed:   Not Applicable    PLAN: (Patient Tasks / Therapist Tasks / Other)  Continue journaling  Do MMPI  Consider listening to podcast on britney    Next session- start session out with MICAELA DYE    January 25, 2022                                                         ______________________________________________________________________    Treatment Plan    Patient's Name: Randi Cleary  YOB: 1955    Date: 12/10/21    DSM5 Diagnoses: 296.32 (F33.1)  "Major Depressive Disorder, Recurrent Episode, Moderate With anxious distress  Psychosocial / Contextual Factors: Patient's entire family of origin has , she now has a sister-in-law and  as support.  Relationship with  is conflictual.  Patient is reporting increase in physical symptoms due to COVID-19.  Patient is off of pain medications.  WHODAS: 42    Referral / Collaboration:  Referral to another professional/service is not indicated at this time.     Anticipated number of session or this episode of care: 12-15      MeasurableTreatment Goal(s) related to diagnosis / functional impairment(s)  Goal 1: Patient will \"jumpstart, getting going with the things I need to be doing around the house as far as picking up, doing things, trying to do something every day.  Also to lessen the animosity between me and my .\"    I will know I've met my goal when my shoulders are fixed and I can see.      Objective #A (Patient Action)    Patient will increase frequency of engaging her in ADLs.  Status: Continued - Date(s): 12/10/21    Intervention(s)  Therapist will engage patient in CBT, specifically behavioral activation.    Objective #B  Patient will track and record at least 5 pleasant exchanges with . Patient will be able to identify at least 5 positive traits about her  and how he relates to her.  Status: Continued - Date(s): 12/10/21    Intervention(s)  Therapist will teach assertiveness skills and assign homework related to relationship interactions.    Objective #C  Patient will reduce level of depressive and anxious features as evidenced by reduction in score on her CHAVO-7 and PHQ-9 (scores of 15 and 16 at first measurment, respectively).  Status: Continued - Date(s):  12/10/21    Intervention(s)  Therapist will engage patient in person-centered therapy and CBT.    Patient has reviewed and agreed to the above plan.      MICAELA SLADE  December 10, 2021                                " "                Randi RIVERA Cathy Evin          SAFETY PLAN:  Step 1: Warning signs / cues (Thoughts, images, mood, situation, behavior) that a crisis may be developing:  ? Thoughts: \"I don't want to continue\" \"I am unwanted\"  ? Images: none  ? Thinking Processes: ruminating  ? Mood: anger  ? Behaviors: isolating/withdrawing , can't stop crying, not taking care of myself and not taking care of my responsibilities  ? Situations: small triggers, such as not being able to find something, or dropping something   Step 2: Coping strategies - Things I can do to take my mind off of my problems without contacting another person (relaxation technique, physical activity):  ? Distress Tolerance Strategies:  arts and crafts: drawing, play with my pet , listen to positive and upbeat music: any, change body temperature (ice pack/cold water)  and paced breathing/progressive muscle relaxation  ? Physical Activities: go for a walk, deep breathing and stretching   ? Focus on helpful thoughts:  \"You've been through this before, you can get through it again.\"  Step 3: People and social settings that provide distraction:                 Name: Carmen                            Name: Darien                           Name: Aleida       ? pool, shopping, Carmen's house, [employer]       Step 4: Remind myself of people and things that are important to me and worth living for:  Clifford Little Donna, post-COVID world, options of what could be in your future        Step 5: When I am in crisis, I can ask these people to help me use my safety plan:                 Name: Sidney  Step 6: Making the environment safe:   ? go to sleep/daydream  Step 7: Professionals or agencies I can contact during a crisis:  ? [facility] Daytime Number: [phone]  ? Suicide Prevention Lifeline: 4-107-722-TALK (2432)  ? Crisis Text Line Service (available 24 hours a day, 7 days a week): Text [place] to 114434    Local Crisis Services: [facility] Crisis: " [phone]     Call 911 or go to my nearest emergency department.       I helped develop this safety plan and agree to use it when needed.  I have been given a copy of this plan.       Client signature _________________________________________________________________  Today s date:  11/24/2020  Adapted from Safety Plan Template 2008 Randi Poole and Robby Barba is reprinted with the express permission of the authors.  No portion of the Safety Plan Template may be reproduced without the express, written permission.  You can contact the authors at [email or web address] or [email or web address].

## 2022-01-25 NOTE — TELEPHONE ENCOUNTER
Winnie calls to inquire about Norco 5-325 mg  refill - takes 1 tab tid. Also takes Tylenol and Robaxin as prescribed. She understands that it will be her last Uneeda refill from our practice as [facility] will [facility] management.  Follow up 02/03/2022.  Nalini Cohen RN, CNRN

## 2022-01-26 ENCOUNTER — VIRTUAL VISIT (OUTPATIENT)
Dept: PSYCHOLOGY | Facility: CLINIC | Age: 67
End: 2022-01-26
Payer: MEDICARE

## 2022-01-26 DIAGNOSIS — F33.1 MAJOR DEPRESSIVE DISORDER, RECURRENT EPISODE, MODERATE (H): Primary | ICD-10-CM

## 2022-01-26 DIAGNOSIS — F41.1 GAD (GENERALIZED ANXIETY DISORDER): ICD-10-CM

## 2022-01-26 PROCEDURE — 90834 PSYTX W PT 45 MINUTES: CPT | Mod: 95 | Performed by: PSYCHOLOGIST

## 2022-01-26 ASSESSMENT — PATIENT HEALTH QUESTIONNAIRE - PHQ9
SUM OF ALL RESPONSES TO PHQ QUESTIONS 1-9: 15
SUM OF ALL RESPONSES TO PHQ QUESTIONS 1-9: 15

## 2022-01-26 NOTE — PROGRESS NOTES
Progress Note    Patient Name: Randi Cleary  Date: 2022       Service Type:  Second general psychological testing appointment      Session Start Time: 2:00pm  Session End Time:  2:52pm     Session Length: 52 minutes    Session #: 2    Attendees: Client attended alone    Service Modality:  Video Visit:      Provider verified identity through the following two step process.  Patient provided:  Patient  and Patient address    Telemedicine Visit: The patient's condition can be safely assessed and treated via synchronous audio and visual telemedicine encounter.      Reason for Telemedicine Visit: Services only offered telehealth    Originating Site (Patient Location): Patient's home    Distant Site (Provider Location): Provider Remote Setting- Home Office    Consent:  The patient/guardian has verbally consented to: the potential risks and benefits of telemedicine (video visit) versus in person care; bill my insurance or make self-payment for services provided; and responsibility for payment of non-covered services.     Patient would like the video invitation sent by:  Send to e-mail at: [email or web address]    Mode of Communication:  Video Conference via [facility]    As the provider I attest to compliance with applicable laws and regulations related to telemedicine.      PHQ 2022   PHQ-9 Total Score 20 15 15   Q9: Thoughts of better off dead/self-harm past 2 weeks Several days Not at all Not at all   F/U: Thoughts of suicide or self-harm Yes - -   F/U: Self harm-plan No - -   F/U: Self-harm action No - -   F/U: Safety concerns No - -   Some encounter information is confidential and restricted. Go to Review Flowsheets activity to see all data.       CHAVO-7 SCORE 2022   Total Score - - 18 (severe anxiety)   Total Score 18 18 18   Some encounter information is confidential and restricted. Go to Review Flowsheets  activity to see all data.         DATA  Interactive Complexity: No  Crisis: No       Progress Since Last Session (Related to Symptoms / Goals / Homework):   Symptoms: Stable, no change.    Homework: Patient completed MMPI-2.      Current / Ongoing Stressors and Concerns:   Discussed manifestations of mental health symptoms in various areas of her life.                  Treatment Objective(s) Addressed in This Session:          Continued with testing process. Feedback session scheduled.                 Intervention:              Continued information gathering specific to mood symptoms. Ended session by discussing feedback process.   CBT: socratic questioning, positive reinforcement  EFT: empathetic attunement, emotion checking  MI: open ended questions, affirmations, reflections       ASSESSMENT: Current Emotional / Mental Status (status of significant symptoms):   Risk status (Self / Other harm or suicidal ideation)   Patient denies current fears or concerns for personal safety.   Patient denies current or recent suicidal ideation or behaviors.   Patient denies current or recent homicidal ideation or behaviors.   Patient denies current or recent self injurious behavior or ideation.   Patient denies other safety concerns.   Patient reports there has been no change in risk factors since their last session.     Patient reports there has been no change in protective factors since their last session.     Recommended that patient call 911 or go to the local ED should there be a change in any of these risk factors.     Appearance:   Appropriate    Eye Contact:   Good    Psychomotor Behavior: Normal    Attitude:   Cooperative    Orientation:   All   Speech    Rate / Production: Normal     Volume:  Normal    Mood:    Normal   Affect:    Appropriate  Tearful   Thought Content:  Clear    Thought Form:  Coherent  Logical    Insight:    Good      Medication Review:   No current psychiatric medications prescribed     Medication  Compliance:   NA     Changes in Health Issues:   None reported     Chemical Use Review:   Substance Use: Chemical use reviewed, no active concerns identified      Nicotine Use: No current tobacco use.      Diagnosis:  1. Major depressive disorder, recurrent episode, moderate (H)    2. CHAVO (generalized anxiety disorder)    Rule out bipolar disorder  Rule out trauma disorder  Rule out panic disorder    PLAN:   Feedback session scheduled 2/7/2022.      Ivy Wagner PsyD, LP  Clinical Psychologist

## 2022-01-28 ENCOUNTER — VIRTUAL VISIT (OUTPATIENT)
Dept: PSYCHOLOGY | Facility: CLINIC | Age: 67
End: 2022-01-28
Payer: MEDICARE

## 2022-01-28 ENCOUNTER — PATIENT OUTREACH (OUTPATIENT)
Dept: CARE COORDINATION | Facility: CLINIC | Age: 67
End: 2022-01-28
Payer: MEDICARE

## 2022-01-28 DIAGNOSIS — F41.1 GAD (GENERALIZED ANXIETY DISORDER): ICD-10-CM

## 2022-01-28 DIAGNOSIS — F31.81 BIPOLAR 2 DISORDER (H): Primary | ICD-10-CM

## 2022-01-28 PROCEDURE — 90834 PSYTX W PT 45 MINUTES: CPT | Mod: 95 | Performed by: SOCIAL WORKER

## 2022-01-28 NOTE — PROGRESS NOTES
Progress Note    Patient Name: Randi Cleary  Date: 1/28/22         Service Type: Individual      Session Start Time: 11:00 AM  Session End Time: 11:48 AM     Session Length: 48 minutes     Session #: 83    Attendees: Client attended alone    Service Modality:  Video Visit:    Telemedicine Visit: The patient's condition can be safely assessed and treated via synchronous audio and visual telemedicine encounter.      Reason for Telemedicine Visit: Services only offered telehealth    Originating Site (Patient Location): Patient's home    Distant Site (Provider Location): Provider Remote Setting- Home Office    Consent:  The patient/guardian has verbally consented to: the potential risks and benefits of telemedicine (video visit) versus in person care; bill my insurance or make self-payment for services provided; and responsibility for payment of non-covered services.     Mode of Communication:  Video Conference via "[employer]    As the provider I attest to compliance with applicable laws and regulations related to telemedicine.      Treatment Plan Last Reviewed: 12/10/21  PHQ-9 / CHAVO-7 :   PHQ 1/25/2022 1/25/2022 1/28/2022   PHQ-9 Total Score 15 15 17   Q9: Thoughts of better off dead/self-harm past 2 weeks Not at all Not at all Several days   F/U: Thoughts of suicide or self-harm - - No   F/U: Self harm-plan - - -   F/U: Self-harm action - - -   F/U: Safety concerns - - No   Some encounter information is confidential and restricted. Go to Review Flowsheets activity to see all data.     CHAVO-7 SCORE 1/14/2022 1/14/2022 1/14/2022   Total Score - - 18 (severe anxiety)   Total Score 18 18 18   Some encounter information is confidential and restricted. Go to Review Flowsheets activity to see all data.         DATA  Extended Session (53+ minutes): No  Interactive Complexity: No  Crisis: No      Progress Since Last Session (Related to Symptoms / Goals / Homework):   Symptoms:  Improving overall, though frustration with a peer     Homework: Achieved / completed to satisfaction  Continue journaling  -done  Do MMPI -done  Consider listening to podcast on britney -done     Episode of Care Goals: Minimal progress - ACTION (Actively working towards change); Intervened by reinforcing change plan / affirming steps taken     Current / Ongoing Stressors and Concerns:   Patient is currently socially isolated. She has a conflictual relationship with her .  She is getting minimal physical activity.  She had recent eye surgery     Treatment Objective(s) Addressed in This Session:   Patient will increase frequency of engaging her in ADLs.  Patient will track and record at least 5 pleasant exchanges with . Patient will be able to identify at least 5 positive traits about her .  Patient will reduce level of depressive and anxious features as evidenced by reduction in score on her CHAVO-7 and PHQ-9 (scores of 15 and 16 at first measurment, respectively).     Intervention:   Supportive Therapy: Internal Family Systems Therapy: Discussed worries about being open with testing and having this reflected in her record. Talked about concerns about her recovery as she's transitioning off of the pain medication. Reviewed homework and identified successes and barriers to completion.  Looked at some frustrations she's been having with a friend. Recognized a part of herself that is very sensitive from rejection and wants to be needed and appreciated. Recognized a pattern of then putting up emotional walls to try and not be hurt. Started to explore the history of this pattern and agreed to continue to work on recognition of it.     ASSESSMENT: Current Emotional / Mental Status (status of significant symptoms):   Risk status (Self / Other harm or suicidal ideation)   Patient denies current fears or concerns for personal safety.   Patient denies current or recent suicidal ideation or behaviors.   Patient  denies current or recent homicidal ideation or behaviors.   Patient denies current or recent self injurious behavior or ideation.   Patient denies other safety concerns.   Patient reports there has been no change in risk factors since their last session.     Patient reports there has been no change in protective factors since their last session.     A safety and risk management plan has been developed including: Patient consented to co-developed safety plan.  Safety and risk management plan was completed.  Patient agreed to use safety plan should any safety concerns arise.  A copy was given to the patient at a past session, on 11/24/2020, and a copy is attached to the bottom of this note.      Appearance:   Appropriate    Eye Contact:   Fair    Psychomotor Behavior: Normal    Attitude:   Cooperative    Orientation:   All   Speech    Rate / Production: Normal/ Responsive    Volume:  Normal    Mood:    content, moments of hopefulness   Affect:    Bright    Thought Content:  Clear    Thought Form:  Coherent    Insight:    Fair      Medication Review:   No changes to current psychiatric medication(s)      Medication Compliance:   Yes     Changes in Health Issues:   None reported     Chemical Use Review:   Substance Use: decrease in use.  Patient reports frequency of use none since the third week of August.  Provided encouragement towards sobriety        Tobacco Use: No current tobacco use.      Diagnosis:  1. Bipolar 2 disorder (H)    2. CHAVO (generalized anxiety disorder)        Collateral Reports Completed:   Not Applicable    PLAN: (Patient Tasks / Therapist Tasks / Other)  Continue journaling- journal about the part that puts up walls and the part that is sensitive to rejection          MICAELA SLADE    January 28, 2022                                                         ______________________________________________________________________    Treatment Plan    Patient's Name: Randi Cleary  Date Of  "Birth: 1955    Date: 12/10/21    DSM5 Diagnoses: 296.32 (F33.1) Major Depressive Disorder, Recurrent Episode, Moderate With anxious distress  Psychosocial / Contextual Factors: Patient's entire family of origin has , she now has a sister-in-law and  as support.  Relationship with  is conflictual.  Patient is reporting increase in physical symptoms due to COVID-19.  Patient is off of pain medications.  WHODAS: 42    Referral / Collaboration:  Referral to another professional/service is not indicated at this time.     Anticipated number of session or this episode of care: 12-15      MeasurableTreatment Goal(s) related to diagnosis / functional impairment(s)  Goal 1: Patient will \"jumpstart, getting going with the things I need to be doing around the house as far as picking up, doing things, trying to do something every day.  Also to lessen the animosity between me and my .\"    I will know I've met my goal when my shoulders are fixed and I can see.      Objective #A (Patient Action)    Patient will increase frequency of engaging her in ADLs.  Status: Continued - Date(s): 12/10/21    Intervention(s)  Therapist will engage patient in CBT, specifically behavioral activation.    Objective #B  Patient will track and record at least 5 pleasant exchanges with . Patient will be able to identify at least 5 positive traits about her  and how he relates to her.  Status: Continued - Date(s): 12/10/21    Intervention(s)  Therapist will teach assertiveness skills and assign homework related to relationship interactions.    Objective #C  Patient will reduce level of depressive and anxious features as evidenced by reduction in score on her CHAVO-7 and PHQ-9 (scores of 15 and 16 at first measurment, respectively).  Status: Continued - Date(s):  12/10/21    Intervention(s)  Therapist will engage patient in person-centered therapy and CBT.    Patient has reviewed and agreed to the above " "plan.      CRUZ MICAELA BAKER JO  December 10, 2021                                                Randi Cleary          SAFETY PLAN:  Step 1: Warning signs / cues (Thoughts, images, mood, situation, behavior) that a crisis may be developing:  ? Thoughts: \"I don't want to continue\" \"I am unwanted\"  ? Images: none  ? Thinking Processes: ruminating  ? Mood: anger  ? Behaviors: isolating/withdrawing , can't stop crying, not taking care of myself and not taking care of my responsibilities  ? Situations: small triggers, such as not being able to find something, or dropping something   Step 2: Coping strategies - Things I can do to take my mind off of my problems without contacting another person (relaxation technique, physical activity):  ? Distress Tolerance Strategies:  arts and crafts: drawing, play with my pet , listen to positive and upbeat music: any, change body temperature (ice pack/cold water)  and paced breathing/progressive muscle relaxation  ? Physical Activities: go for a walk, deep breathing and stretching   ? Focus on helpful thoughts:  \"You've been through this before, you can get through it again.\"  Step 3: People and social settings that provide distraction:                 Name: Carmen                            Name: Darien                           Name: Aleida       ? pool, shopping, Carmen's house, [employer]       Step 4: Remind myself of people and things that are important to me and worth living for:  Clifford Little Donna, post-COVID world, options of what could be in your future        Step 5: When I am in crisis, I can ask these people to help me use my safety plan:                 Name: Sidney  Step 6: Making the environment safe:   ? go to sleep/daydream  Step 7: Professionals or agencies I can contact during a crisis:  ? [facility] Daytime Number: 694-435-5725  ? Suicide Prevention Lifeline: 7-248-761-JAPB (3126)  ? Crisis Text Line Service (available 24 hours a day, 7 days a " week): Text [place] to 690176    Local Crisis Services: [facility] Crisis: [phone]     Call 911 or go to my nearest emergency department.       I helped develop this safety plan and agree to use it when needed.  I have been given a copy of this plan.       Client signature _________________________________________________________________  Today s date:  11/24/2020  Adapted from Safety Plan Template 2008 Randi Poole and Robby Barba is reprinted with the express permission of the authors.  No portion of the Safety Plan Template may be reproduced without the express, written permission.  You can contact the authors at [email or web address] or [email or web address].

## 2022-01-28 NOTE — LETTER
[facility]  [address] [facility] [place] [address]    January 28, 2022    Randi Cleary  [address] [address]    Dear Randi,  Your Care Team congratulates you on your journey to maintain wellness. This document will help guide you on your journey to maintain a healthy lifestyle.  You can use this to help you overcome any barriers you may encounter.  If you should have any questions or concerns, you can contact the members of your Care Team or contact your Primary Care Clinic for assistance.     Health Maintenance  Health Maintenance Reviewed:      My Access Plan  Medical Emergency 911   Primary Clinic Line [facility] - [phone]   24 Hour Appointment Line [phone] or  [phone]) (toll-free)   24 Hour Nurse Line [phone] (toll-free)   Preferred Urgent Care     Preferred Hospital     Preferred Pharmacy [facility] [place], [place] - [address] [employer]     Behavioral Health Crisis Line The National Suicide Prevention Lifeline at 1-627.924.3443 or 911     My Care Team Members  Patient Care Team       Relationship Specialty Notifications Start End    Cari Torres PCP - General Family Medicine  10/18/21     Phone: [phone] Fax: [phone]         [address] [facility] [place] [address]    Gladis Stanley, RN Specialty Care Coordinator Cardiology Admissions 8/13/19     Pulmonary HTN    Phone: [phone] Pager: [phone] Fax: [phone]       Eli Hilton, RN Specialty Care Coordinator Cardiology Admissions 8/13/19     Pulmonary HTN    Phone: [phone] Pager: [phone] Fax: [phone]       Bindu Walden, RN Specialty Care Coordinator Cardiology Admissions 8/26/19     Pulmonary HTN    Phone: [phone] Pager: [phone]        Alee Coker MD MD INTERNAL MEDICINE - ENDOCRINOLOGY, DIABETES & METABOLISM  9/26/19     Phone: [phone] Fax: [phone]          [address] [facility] [address] [facility] [address]    Francisco J Edwards MD MD Cardiology  3/17/20     Phone: [phone] Fax: [phone]         [address] [facility] [place] [address] [facility] [address]    Mary Kay Lan Therapist  - Clinical Admissions 6/22/20     Phone: [phone] Fax: [phone]         [address] [facility] [address]    Francisco J Edwards MD Assigned Heart and Vascular Provider   10/23/20     Phone: [phone] Fax: [phone]         [address] [facility] [place] [address] [facility] [address]    Alee Coker MD Assigned Endocrinology Provider   10/23/20     Phone: [phone] Fax: [phone]         [address] [facility] [place] [address] [facility] [address]    Sharmila Gregory MD Assigned Pulmonology Provider   1/10/21     Phone: [phone] Fax: [phone]         [address] [facility] [place] [address] [facility] [address]    Dsuty Dubois MD Assigned Behavioral Health Provider   7/16/21     Phone: [phone] Fax: [phone]         [address] [place] [address]    Angela Gregory MD Assigned Neuroscience Provider   7/25/21     Phone: [phone] Fax: [phone] [address] [facility] [address]    Tova Pablo MD Assigned Cancer Care Provider   7/25/21     Phone: [phone] Fax: [phone]         [address] [facility] [address]    Loida Stockton MD Assigned PCP   9/26/21     Phone: [phone] Fax: [phone]         [address] [facility] [address]              Goals        COMPLETED: Mental Health Management (pt-stated)       Goal Statement: I would like to have my depression under better control in the next 6 months.     Date Goal set: 12/29/20   Barriers: Long history of depression   Strengths: Strong advocate for herself   Date to Achieve By: 5/29/21   Patient expressed understanding of goal: Yes     Personal Plan:  1. I will continue to attend all scheduled appointments with Dr. Dubois and my PCP and will  follow recommendations.   2. I will take my medications ordered by Dr. Dubois as prescribed.   3. I will continue to attend all scheduled appointments with my therapist, Corine.   4. I have returned to swimming at the [employer] [place] as I know this helps me both physically and mentally.   5. I will continue to participate in activities that I enjoy and keeps me busy at home.                Advance Care Plans/Directives Type:      We notice that you do not have an Advance Directive on file. Upon completion of your Health Care Directive, please bring a copy with you to your next office visit.    It has been your Clinic Care Team's pleasure to work with you on your goals.    Regards,  Your Clinic Care Team

## 2022-01-28 NOTE — PROGRESS NOTES
Clinic Care Coordination Contact    Assessment: Per review,  patient has continued to follow the plan of care and assessment is negative for any new needs or concerns. She is well connected with Behavioral Health supports and is closely followed by multiple specialists. No concerns post- recent surgery.     Enrollment status: Graduated     Plan: No further outreaches at this time.  Patient will continue to follow the plan of care.  If new needs arise a new Care Coordination referral may be placed.  FYI to PCP

## 2022-01-29 ASSESSMENT — PATIENT HEALTH QUESTIONNAIRE - PHQ9: SUM OF ALL RESPONSES TO PHQ QUESTIONS 1-9: 17

## 2022-01-31 ENCOUNTER — TELEPHONE (OUTPATIENT)
Dept: FAMILY MEDICINE | Facility: CLINIC | Age: 67
End: 2022-01-31
Payer: MEDICARE

## 2022-01-31 NOTE — TELEPHONE ENCOUNTER
Reason for Call:  Divina is calling for verbal PT orders,  1 x per week for 4 weeks, working on gait, balance and strengthening.    Detailed comments: please call Divina with verbal orders.    Phone Number Patient can be reached at: Other phone number:  [phone]     Best Time: any    Can we leave a detailed message on this number? YES    Call taken on 1/31/2022 at 3:05 PM by Zayra Peterson

## 2022-02-01 ENCOUNTER — VIRTUAL VISIT (OUTPATIENT)
Dept: PSYCHOLOGY | Facility: CLINIC | Age: 67
End: 2022-02-01
Payer: MEDICARE

## 2022-02-01 DIAGNOSIS — F31.81 BIPOLAR 2 DISORDER (H): Primary | ICD-10-CM

## 2022-02-01 DIAGNOSIS — F41.1 GAD (GENERALIZED ANXIETY DISORDER): ICD-10-CM

## 2022-02-01 PROCEDURE — 90834 PSYTX W PT 45 MINUTES: CPT | Mod: 95 | Performed by: SOCIAL WORKER

## 2022-02-01 NOTE — PROGRESS NOTES
Progress Note    Patient Name: Randi Cleary  Date: 2/1/22         Service Type: Individual      Session Start Time: 8:35 AM  Session End Time: 9:22 AM     Session Length: 47 minutes     Session #: 84    Attendees: Client attended alone    Service Modality:  Video Visit:    Telemedicine Visit: The patient's condition can be safely assessed and treated via synchronous audio and visual telemedicine encounter.      Reason for Telemedicine Visit: Services only offered telehealth    Originating Site (Patient Location): Patient's home    Distant Site (Provider Location): Provider Remote Setting- Home Office    Consent:  The patient/guardian has verbally consented to: the potential risks and benefits of telemedicine (video visit) versus in person care; bill my insurance or make self-payment for services provided; and responsibility for payment of non-covered services.     Mode of Communication:  Video Conference via [employer]    As the provider I attest to compliance with applicable laws and regulations related to telemedicine.      Treatment Plan Last Reviewed: 12/10/21  PHQ-9 / CHAVO-7 :   PHQ 1/25/2022 1/28/2022 2/1/2022   PHQ-9 Total Score 15 17 15   Q9: Thoughts of better off dead/self-harm past 2 weeks Not at all Several days Not at all   F/U: Thoughts of suicide or self-harm - No -   F/U: Self harm-plan - - -   F/U: Self-harm action - - -   F/U: Safety concerns - No -   Some encounter information is confidential and restricted. Go to Review Flowsheets activity to see all data.     CHAVO-7 SCORE 1/14/2022 1/14/2022 1/14/2022   Total Score - - 18 (severe anxiety)   Total Score 18 18 18   Some encounter information is confidential and restricted. Go to Review Flowsheets activity to see all data.         DATA  Extended Session (53+ minutes): No  Interactive Complexity: No  Crisis: No      Progress Since Last Session (Related to Symptoms / Goals / Homework):   Symptoms: Improving  "less anger and frustration     Homework: Achieved / completed to satisfaction  Continue journaling- journal about the part that puts up walls and the part that is sensitive to rejection -done     Episode of Care Goals: Minimal progress - ACTION (Actively working towards change); Intervened by reinforcing change plan / affirming steps taken     Current / Ongoing Stressors and Concerns:   Patient is currently socially isolated. She has a conflictual relationship with her .  She is getting minimal physical activity.  She had recent eye surgery     Treatment Objective(s) Addressed in This Session:   Patient will increase frequency of engaging her in ADLs.  Patient will track and record at least 5 pleasant exchanges with . Patient will be able to identify at least 5 positive traits about her .  Patient will reduce level of depressive and anxious features as evidenced by reduction in score on her CHAVO-7 and PHQ-9 (scores of 15 and 16 at first measurment, respectively).     Intervention:   Supportive Therapy: Discussed pain she's had lately and how she's worried about the transition off of the pain medication. Talked through her next steps for getting support with this. Also discussed ways to manage the pain without medication, including the bed. Discussed how she's been feeling overwhelmed easily lately. Talked about how manage this better. She's not having \"doom and gloom\" episodes which related to suicidal ideation and intense ideation. Talked about having hobbies and ways to direct her time and energy.     Patient has been utilizing journaling. She followed up with her friend who felt had hurt her; processed this.    ASSESSMENT: Current Emotional / Mental Status (status of significant symptoms):   Risk status (Self / Other harm or suicidal ideation)   Patient denies current fears or concerns for personal safety.   Patient denies current or recent suicidal ideation or behaviors.   Patient denies " current or recent homicidal ideation or behaviors.   Patient denies current or recent self injurious behavior or ideation.   Patient denies other safety concerns.   Patient reports there has been no change in risk factors since their last session.     Patient reports there has been no change in protective factors since their last session.     A safety and risk management plan has been developed including: Patient consented to co-developed safety plan.  Safety and risk management plan was completed.  Patient agreed to use safety plan should any safety concerns arise.  A copy was given to the patient at a past session, on 11/24/2020, and a copy is attached to the bottom of this note.      Appearance:   Appropriate    Eye Contact:   Fair    Psychomotor Behavior: Normal    Attitude:   Cooperative    Orientation:   All   Speech    Rate / Production: Normal/ Responsive    Volume:  Normal    Mood:    content, moments of hopefulness   Affect:    Bright    Thought Content:  Clear    Thought Form:  Coherent    Insight:    Fair      Medication Review:   No changes to current psychiatric medication(s)      Medication Compliance:   Yes     Changes in Health Issues:   None reported     Chemical Use Review:   Substance Use: decrease in use.  Patient reports frequency of use none since the third week of August.  Provided encouragement towards sobriety        Tobacco Use: No current tobacco use.      Diagnosis:  1. Bipolar 2 disorder (H)    2. CHAVO (generalized anxiety disorder)        Collateral Reports Completed:   Not Applicable    PLAN: (Patient Tasks / Therapist Tasks / Other)  Continue journaling- journal about the part that puts up walls and the part that is sensitive to rejection          MICAELA SLADE    February 2, 2022                                                         ______________________________________________________________________    Treatment Plan    Patient's Name: Randi Cleary  Date Of  "Birth: 1955    Date: 12/10/21    DSM5 Diagnoses: 296.32 (F33.1) Major Depressive Disorder, Recurrent Episode, Moderate With anxious distress  Psychosocial / Contextual Factors: Patient's entire family of origin has , she now has a sister-in-law and  as support.  Relationship with  is conflictual.  Patient is reporting increase in physical symptoms due to COVID-19.  Patient is off of pain medications.  WHODAS: 42    Referral / Collaboration:  Referral to another professional/service is not indicated at this time.     Anticipated number of session or this episode of care: 12-15      MeasurableTreatment Goal(s) related to diagnosis / functional impairment(s)  Goal 1: Patient will \"jumpstart, getting going with the things I need to be doing around the house as far as picking up, doing things, trying to do something every day.  Also to lessen the animosity between me and my .\"    I will know I've met my goal when my shoulders are fixed and I can see.      Objective #A (Patient Action)    Patient will increase frequency of engaging her in ADLs.  Status: Continued - Date(s): 12/10/21    Intervention(s)  Therapist will engage patient in CBT, specifically behavioral activation.    Objective #B  Patient will track and record at least 5 pleasant exchanges with . Patient will be able to identify at least 5 positive traits about her  and how he relates to her.  Status: Continued - Date(s): 12/10/21    Intervention(s)  Therapist will teach assertiveness skills and assign homework related to relationship interactions.    Objective #C  Patient will reduce level of depressive and anxious features as evidenced by reduction in score on her CHAVO-7 and PHQ-9 (scores of 15 and 16 at first measurment, respectively).  Status: Continued - Date(s):  12/10/21    Intervention(s)  Therapist will engage patient in person-centered therapy and CBT.    Patient has reviewed and agreed to the above " "plan.      CRUZ MICAELA BAKER JO  December 10, 2021                                                Randi Cleary          SAFETY PLAN:  Step 1: Warning signs / cues (Thoughts, images, mood, situation, behavior) that a crisis may be developing:  ? Thoughts: \"I don't want to continue\" \"I am unwanted\"  ? Images: none  ? Thinking Processes: ruminating  ? Mood: anger  ? Behaviors: isolating/withdrawing , can't stop crying, not taking care of myself and not taking care of my responsibilities  ? Situations: small triggers, such as not being able to find something, or dropping something   Step 2: Coping strategies - Things I can do to take my mind off of my problems without contacting another person (relaxation technique, physical activity):  ? Distress Tolerance Strategies:  arts and crafts: drawing, play with my pet , listen to positive and upbeat music: any, change body temperature (ice pack/cold water)  and paced breathing/progressive muscle relaxation  ? Physical Activities: go for a walk, deep breathing and stretching   ? Focus on helpful thoughts:  \"You've been through this before, you can get through it again.\"  Step 3: People and social settings that provide distraction:                 Name: Carmen                            Name: Darien                           Name: Aleida       ? pool, shopping, Carmen's house, [employer]       Step 4: Remind myself of people and things that are important to me and worth living for:  Clifford Little Donna, post-COVID world, options of what could be in your future        Step 5: When I am in crisis, I can ask these people to help me use my safety plan:                 Name: Sidney  Step 6: Making the environment safe:   ? go to sleep/daydream  Step 7: Professionals or agencies I can contact during a crisis:  ? [facility] Daytime Number: [phone]  ? Suicide Prevention Lifeline: 7-761-104-TCDE (4966)  ? Crisis Text Line Service (available 24 hours a day, 7 days a " week): Text [place] to 381406    Local Crisis Services: [facility]: [phone]     Call 911 or go to my nearest emergency department.       I helped develop this safety plan and agree to use it when needed.  I have been given a copy of this plan.       Client signature _________________________________________________________________  Today s date:  11/24/2020  Adapted from Safety Plan Template 2008 Randi Poole and Robby Barba is reprinted with the express permission of the authors.  No portion of the Safety Plan Template may be reproduced without the express, written permission.  You can contact the authors at [email or web address] or [email or web address].

## 2022-02-02 ENCOUNTER — TELEPHONE (OUTPATIENT)
Dept: PALLIATIVE MEDICINE | Facility: OTHER | Age: 67
End: 2022-02-02
Payer: MEDICARE

## 2022-02-02 DIAGNOSIS — G95.20 CORD COMPRESSION (H): Chronic | ICD-10-CM

## 2022-02-02 RX ORDER — HYDROCODONE BITARTRATE AND ACETAMINOPHEN 5; 325 MG/1; MG/1
1 TABLET ORAL 3 TIMES DAILY PRN
Qty: 21 TABLET | Refills: 0 | Status: SHIPPED | OUTPATIENT
Start: 2022-02-02 | End: 2022-02-10

## 2022-02-02 ASSESSMENT — PATIENT HEALTH QUESTIONNAIRE - PHQ9: SUM OF ALL RESPONSES TO PHQ QUESTIONS 1-9: 15

## 2022-02-02 NOTE — TELEPHONE ENCOUNTER
Patient calls with a status update:  She had a recent neck surgery.  Since surgery she is reporting neck and shoulder pain.  Follow up apt with Dr. Gregory 2/3/22  Follow up with [facility]: 2/17/22  Patient eludes to needing additional pain medications and unsure if this will be prescribed by the surgeon.  Patient to review any concerns or questions regarding this at the follow up apt: 2/3/22    Will route to both providers as an FYI

## 2022-02-02 NOTE — CONFIDENTIAL NOTE
Refilled for another week of narcotic, will come up with a pain plan tomorrow at her appt. She does have an upcoming appt with Dr. Dubois on 2/17/2022.

## 2022-02-03 ENCOUNTER — HOSPITAL ENCOUNTER (OUTPATIENT)
Dept: RADIOLOGY | Facility: HOSPITAL | Age: 67
Discharge: HOME | End: 2022-02-03
Attending: SURGERY | Admitting: SURGERY
Payer: MEDICARE

## 2022-02-03 ENCOUNTER — OFFICE VISIT (OUTPATIENT)
Dept: NEUROSURGERY | Facility: CLINIC | Age: 67
End: 2022-02-03
Payer: MEDICARE

## 2022-02-03 VITALS
WEIGHT: 216 LBS | HEIGHT: 66 IN | DIASTOLIC BLOOD PRESSURE: 82 MMHG | OXYGEN SATURATION: 96 % | BODY MASS INDEX: 34.72 KG/M2 | HEART RATE: 114 BPM | SYSTOLIC BLOOD PRESSURE: 120 MMHG

## 2022-02-03 DIAGNOSIS — M54.12 CERVICAL RADICULOPATHY: ICD-10-CM

## 2022-02-03 DIAGNOSIS — G95.20 CORD COMPRESSION (H): Primary | ICD-10-CM

## 2022-02-03 DIAGNOSIS — M47.12 CERVICAL SPONDYLOSIS WITH MYELOPATHY: ICD-10-CM

## 2022-02-03 PROCEDURE — 72040 X-RAY EXAM NECK SPINE 2-3 VW: CPT

## 2022-02-03 PROCEDURE — 99024 POSTOP FOLLOW-UP VISIT: CPT | Performed by: NURSE PRACTITIONER

## 2022-02-03 RX ORDER — METHOCARBAMOL 500 MG/1
500 TABLET, FILM COATED ORAL 4 TIMES DAILY
Qty: 30 TABLET | Refills: 0 | Status: SHIPPED | OUTPATIENT
Start: 2022-02-03 | End: 2022-02-10

## 2022-02-03 ASSESSMENT — ANXIETY QUESTIONNAIRES
2. NOT BEING ABLE TO STOP OR CONTROL WORRYING: MORE THAN HALF THE DAYS
6. BECOMING EASILY ANNOYED OR IRRITABLE: NEARLY EVERY DAY
7. FEELING AFRAID AS IF SOMETHING AWFUL MIGHT HAPPEN: SEVERAL DAYS
GAD7 TOTAL SCORE: 15
5. BEING SO RESTLESS THAT IT IS HARD TO SIT STILL: MORE THAN HALF THE DAYS
GAD7 TOTAL SCORE: 15
GAD7 TOTAL SCORE: 15
7. FEELING AFRAID AS IF SOMETHING AWFUL MIGHT HAPPEN: SEVERAL DAYS
4. TROUBLE RELAXING: NEARLY EVERY DAY
GAD7 TOTAL SCORE: 15
2. NOT BEING ABLE TO STOP OR CONTROL WORRYING: MORE THAN HALF THE DAYS
3. WORRYING TOO MUCH ABOUT DIFFERENT THINGS: SEVERAL DAYS
GAD7 TOTAL SCORE: 15
7. FEELING AFRAID AS IF SOMETHING AWFUL MIGHT HAPPEN: SEVERAL DAYS
GAD7 TOTAL SCORE: 15
4. TROUBLE RELAXING: NEARLY EVERY DAY
5. BEING SO RESTLESS THAT IT IS HARD TO SIT STILL: MORE THAN HALF THE DAYS
3. WORRYING TOO MUCH ABOUT DIFFERENT THINGS: SEVERAL DAYS
7. FEELING AFRAID AS IF SOMETHING AWFUL MIGHT HAPPEN: SEVERAL DAYS
1. FEELING NERVOUS, ANXIOUS, OR ON EDGE: NEARLY EVERY DAY
6. BECOMING EASILY ANNOYED OR IRRITABLE: NEARLY EVERY DAY
1. FEELING NERVOUS, ANXIOUS, OR ON EDGE: NEARLY EVERY DAY

## 2022-02-03 ASSESSMENT — MIFFLIN-ST. JEOR: SCORE: 1526.52

## 2022-02-03 NOTE — PATIENT INSTRUCTIONS
1. Wean from brace. Remove the brace for 1-2 hours a day, increasing each day by 1-2 hour increments.  If at any time during the wean you experience excessive fatigue, back pain or spasm, back down to the previous level for a day or so, then resume schedule.  Once out of brace for a full 8 hours, discontinue altogether or wear only as needed for comfort.     2. Increase weight by 5 lbs per week until back to normal     3. Increase activity as you tolerate - do not do things that hurt     4. Pain control   Muscle relaxer - wean down   tylenol up to 3000 - 4000 mg every 24 hours which includes the tylenol in your hydrocodone   ibuprofen up to 2400 mg in 24 hours unless you use aleve or motrin or naproxen - dosing is different   Ice, heat as you tolerate   Lidocaine (wear during the day, off at night), OR biofreeze, OR salon spa   Hydrocodone - wean down to off OR per recommendations of Dr Dubois - try to use only when needed     5. physical therapy  - [facility]     6. Come back in 6-8 weeks to see one of the NP's

## 2022-02-03 NOTE — PROGRESS NOTES
Neurosurgery Progress Note  2/3/2022    A/P: Winnie Cleary is a 66 year old S/P left open door laminoplasty C3-6, foraminotomies left C4-7 for cervical myelopathy, radiculopathy, stenosis with Dr Gregory 12/21/2021. XR today looks good.     Winnie seems to be doing well. She has many concerns with her on going symptoms, progress and how to move forward. Long discussion about pain control, course of recovery and what to expect as she continues to heal. She was satisfied with our discussion. Discussed having pain team resume her pain medications. She has a long history of opioid use. She is established with Dr Dubois and has appointment in the next couple of weeks.     PLAN:   1. Wean from brace. Remove the brace for 1-2 hours a day, increasing each day by 1-2 hour increments.  If at any time during the wean you experience excessive fatigue, back pain or spasm, back down to the previous level for a day or so, then resume schedule.  Once out of brace for a full 8 hours, discontinue altogether or wear only as needed for comfort.   2. Increase weight by 5 lbs per week until back to normal   3. Increase activity as you tolerate - do not do things that hurt   4. Pain control   Muscle relaxer - wean down (filled today)   tylenol up to 3000 - 4000 mg every 24 hours which includes the tylenol in your hydrocodone   ibuprofen up to 2400 mg in 24 hours unless you use aleve or motrin or naproxen - dosing is different   Ice, heat as you tolerate   Lidocaine (wear during the day, off at night), OR biofreeze, OR salon spa   Hydrocodone - wean down to off OR per recommendations of Dr Dubois - try to use only when needed (filled yesterday by our team)  5. physical therapy  - [facility]   6. Come back in 6-8 weeks to see one of the NP's    HPI: HPI: Randi Cleary is a 66 year old female who presents with imbalance, numbness and tingling in her hands, fine motor difficulties, left shoulder pain, radiating left arm pain and  "numbness. She complains of both myelopathy and radiculopathy symptoms. MRI of her cervical spine sows multi-level cervical stenosis including moderate at C4-5, and moderate to severe at C6-7 and mild spinal canal stenosis at C5-6 and C3-4 with multi-level severe foraminal narrowing at these levels. Also has retrolisthesis of C4-5 and anterolisthesis of C7-T1. XR does not show any significant instability. [facility] [place] 2021. Reports Hx: taking oxycodone for 20 years, medical cannabis. Sober since august 2021. Lithium orotate and oxarbazepine in the past. Bipolar spectrum disorder. [facility]    Subjective: She gets pain on the left side of her neck. Shoulders feel tight. Worse if she is doing something she \"shouldn't\" (bending, lifting, reaching.\" She feels pain is impacted by bilateral rotator cuff tear. The \"bee stinging\" sensation in her left arm is gone. She still feels like she drops things from her hands but also endorses improvement. Pre-op symptoms Thumb, pointer and middle fingers bilaterally feel numb but are feeling improved. Uses a cane to walk. Has been taking collar off if she is seated upright. Wears it when she is out of bed. Feels like her neck needs support when she is laying upright in bed. Neck feels tired. It has been off more than on recently. She is interested in going to [facility] for PT.     Winnie feels she has made good progress in weaning down her pain medication. She is feeling rushed in her wean. She is taking 1 pain pill every 8 hours, robaxin 4-5 times a day. Lidocaine patches at night. No ibuprofen.     Objective:   Physical exam: /82   Pulse 114   Ht 5' 6\" (1.676 m)   Wt 216 lb (98 kg)   SpO2 96%   BMI 34.86 kg/m      General: seated in chair. Here with a friend. Wearing her cervical collar. Pleasant.     Motor: normal bulk and tone    Strength:   biceps 5/5 right, 5/5 left   Triceps 5/5 right, 5/5 left   Deltoid 5/5 right, 5/5 left  Hand grasp 4+/5 right, 4+/5 " left   Hip flexors 5/5 right, 5/5 left   Quadriceps: 5/5 right, 5/5 left   Ankle dorsiflexion: 5/5 right, 5/5 left   Extensor hallicus longus: 5/5 right, 5/5 left   Ankle plantar flexion : 5/5 right, 5/5 left     Sensation: intact to light touch     Incision: well healed     Images: reviewed with Dr Colt Zhou. Compared to prior.     RIP Max-CNP  [facility]  O: [phone]

## 2022-02-03 NOTE — LETTER
2/3/2022         RE: Randi Cleary  [address] [place] [address]        Dear Colleague,    Thank you for referring your patient, Randi Cleary, to the [facility]. Please see a copy of my visit note below.    Neurosurgery Progress Note  2/3/2022    A/P: Winnie Cleary is a 66 year old S/P left open door laminoplasty C3-6, foraminotomies left C4-7 for cervical myelopathy, radiculopathy, stenosis with Dr Gregory 12/21/2021. XR today looks good.     Winnie seems to be doing well. She has many concerns with her on going symptoms, progress and how to move forward. Long discussion about pain control, course of recovery and what to expect as she continues to heal. She was satisfied with our discussion. Discussed having pain team resume her pain medications. She has a long history of opioid use. She is established with Dr Dubois and has appointment in the next couple of weeks.     PLAN:   1. Wean from brace. Remove the brace for 1-2 hours a day, increasing each day by 1-2 hour increments.  If at any time during the wean you experience excessive fatigue, back pain or spasm, back down to the previous level for a day or so, then resume schedule.  Once out of brace for a full 8 hours, discontinue altogether or wear only as needed for comfort.   2. Increase weight by 5 lbs per week until back to normal   3. Increase activity as you tolerate - do not do things that hurt   4. Pain control   Muscle relaxer - wean down (filled today)   tylenol up to 3000 - 4000 mg every 24 hours which includes the tylenol in your hydrocodone   ibuprofen up to 2400 mg in 24 hours unless you use aleve or motrin or naproxen - dosing is different   Ice, heat as you tolerate   Lidocaine (wear during the day, off at night), OR biofreeze, OR salon spa   Hydrocodone - wean down to off OR per recommendations of Dr Dubois - try to use only when needed (filled yesterday by our team)  5.  "physical therapy  - [facility]   6. Come back in 6-8 weeks to see one of the NP's    HPI: HPI: Randi Cleary is a 66 year old female who presents with imbalance, numbness and tingling in her hands, fine motor difficulties, left shoulder pain, radiating left arm pain and numbness. She complains of both myelopathy and radiculopathy symptoms. MRI of her cervical spine sows multi-level cervical stenosis including moderate at C4-5, and moderate to severe at C6-7 and mild spinal canal stenosis at C5-6 and C3-4 with multi-level severe foraminal narrowing at these levels. Also has retrolisthesis of C4-5 and anterolisthesis of C7-T1. XR does not show any significant instability. [facility] [place] 2021. Reports Hx: taking oxycodone for 20 years, medical cannabis. Sober since august 2021. Lithium orotate and oxarbazepine in the past. Bipolar spectrum disorder. [facility]    Subjective: She gets pain on the left side of her neck. Shoulders feel tight. Worse if she is doing something she \"shouldn't\" (bending, lifting, reaching.\" She feels pain is impacted by bilateral rotator cuff tear. The \"bee stinging\" sensation in her left arm is gone. She still feels like she drops things from her hands but also endorses improvement. Pre-op symptoms Thumb, pointer and middle fingers bilaterally feel numb but are feeling improved. Uses a cane to walk. Has been taking collar off if she is seated upright. Wears it when she is out of bed. Feels like her neck needs support when she is laying upright in bed. Neck feels tired. It has been off more than on recently. She is interested in going to [facility] for PT.     Winnie feels she has made good progress in weaning down her pain medication. She is feeling rushed in her wean. She is taking 1 pain pill every 8 hours, robaxin 4-5 times a day. Lidocaine patches at night. No ibuprofen.     Objective:   Physical exam: /82   Pulse 114   Ht 5' 6\" (1.676 m)   Wt 216 lb (98 kg)   " SpO2 96%   BMI 34.86 kg/m      General: seated in chair. Here with a friend. Wearing her cervical collar. Pleasant.     Motor: normal bulk and tone    Strength:   biceps 5/5 right, 5/5 left   Triceps 5/5 right, 5/5 left   Deltoid 5/5 right, 5/5 left  Hand grasp 4+/5 right, 4+/5 left   Hip flexors 5/5 right, 5/5 left   Quadriceps: 5/5 right, 5/5 left   Ankle dorsiflexion: 5/5 right, 5/5 left   Extensor hallicus longus: 5/5 right, 5/5 left   Ankle plantar flexion : 5/5 right, 5/5 left     Sensation: intact to light touch     Incision: well healed     Images: reviewed with Dr oClt Zhou. Compared to prior.     AZUCENA Max  [facility]  O: [phone]                      Again, thank you for allowing me to participate in the care of your patient.        Sincerely,        DION Thompson CNP

## 2022-02-04 ENCOUNTER — TELEPHONE (OUTPATIENT)
Dept: NEUROSURGERY | Facility: CLINIC | Age: 67
End: 2022-02-04
Payer: MEDICARE

## 2022-02-04 DIAGNOSIS — M48.02 SPINAL STENOSIS IN CERVICAL REGION: Primary | ICD-10-CM

## 2022-02-04 ASSESSMENT — ANXIETY QUESTIONNAIRES
GAD7 TOTAL SCORE: 15
GAD7 TOTAL SCORE: 15

## 2022-02-04 NOTE — TELEPHONE ENCOUNTER
In addition to physical therapy recently ordered for patient, she would also like an order for massage therapy.    Instructed patient to check with insurance to see who is in network for her for massage therapy. If appropriate, please enter external order.

## 2022-02-07 ENCOUNTER — VIRTUAL VISIT (OUTPATIENT)
Dept: PSYCHOLOGY | Facility: CLINIC | Age: 67
End: 2022-02-07
Payer: MEDICARE

## 2022-02-07 DIAGNOSIS — F43.9 TRAUMA AND STRESSOR-RELATED DISORDER: ICD-10-CM

## 2022-02-07 DIAGNOSIS — F41.1 GENERALIZED ANXIETY DISORDER: ICD-10-CM

## 2022-02-07 DIAGNOSIS — F31.81 BIPOLAR 2 DISORDER (H): Primary | ICD-10-CM

## 2022-02-07 PROCEDURE — 96131 PSYCL TST EVAL PHYS/QHP EA: CPT | Mod: 95 | Performed by: PSYCHOLOGIST

## 2022-02-07 PROCEDURE — 96130 PSYCL TST EVAL PHYS/QHP 1ST: CPT | Mod: 95 | Performed by: PSYCHOLOGIST
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
## 2022-02-07 NOTE — Clinical Note
Hello,  I wanted to let you know that I have completed the general psychological evaluation for this patient.  Data support bipolar 2, anxiety, and trauma disorder diagnoses.  The DA indicated that the patient denied trauma at that time, but I think it is important to begin discussing her experiences and decreasing trauma related symptoms.  I also recommended the patient work with her psychiatry provider to discuss medication options.  Please let me know if you have any questions or concerns!  Thanks!  Ivy

## 2022-02-07 NOTE — PROGRESS NOTES
Patient Name: Randi Cleary  Date: 2022       Service Type:  Individual (general psychological testing feedback session)      Session Start Time:  2:10pm  Session End Time:  3:08pm     Session Length: 58 minutes    Session #: 3    Attendees: Patient attended alone    Service Modality: Video Visit:      Provider verified identity through the following two step process.  Patient provided:  Patient  and Patient address    Telemedicine Visit: The patient's condition can be safely assessed and treated via synchronous audio and visual telemedicine encounter.      Reason for Telemedicine Visit: Services only offered telehealth    Originating Site (Patient Location): Patient's home    Distant Site (Provider Location): Provider Remote Setting- Home Office    Consent:  The patient/guardian has verbally consented to: the potential risks and benefits of telemedicine (video visit) versus in person care; bill my insurance or make self-payment for services provided; and responsibility for payment of non-covered services.     Patient would like the video invitation sent by:  Send to e-mail at: [email or web address]    Mode of Communication:  Video Conference via well    As the provider I attest to compliance with applicable laws and regulations related to telemedicine.      PHQ 2022 2022 2/3/2022   PHQ-9 Total Score 17 15 14   Q9: Thoughts of better off dead/self-harm past 2 weeks Several days Not at all Not at all   F/U: Thoughts of suicide or self-harm No - -   F/U: Self harm-plan - - -   F/U: Self-harm action - - -   F/U: Safety concerns No - -   Some encounter information is confidential and restricted. Go to Review Tetherball activity to see all data.       CHAVO-7 SCORE 2/3/2022 2/3/2022 2/3/2022   Total Score - - 15 (severe anxiety)   Total Score 15 15 15   Some encounter information is confidential and restricted. Go to Review Tetherball activity to  see all data.         DATA      Progress Since Last Session (Related to Symptoms / Goals / Homework):   Symptoms: Stable.    Homework: Completed.      Treatment Objective(s) Addressed in This Session:   Provided feedback on ADHD evaluation. Reviewed test results in depth. Plan of care and recommendations were discussed based on testing data. See full report attached on secondary note in this encounter.     Intervention:   Provided feedback to patient regarding testing results, diagnoses, and treatment recommendations. Test results are consistent with bipolar II, general anxiety, and trauma diagnoses. Personalized suggestions regarding symptoms were offered. Patient had the opportunity to ask questions; she expressed understanding.        ASSESSMENT: Current Emotional / Mental Status (status of significant symptoms):   Risk status (Self / Other harm or suicidal ideation)   Patient denies current fears or concerns for personal safety.   Patient denies current or recent suicidal ideation or behaviors.   Patient denies current or recent homicidal ideation or behaviors.   Patient denies current or recent self injurious behavior or ideation.   Patient denies other safety concerns.   Patient reports there has been no change in risk factors since their last session.     Patient reports there has been no change in protective factors since their last session.     Recommended that patient call 911 or go to the local ED should there be a change in any of these risk factors.     Appearance:   Appropriate    Eye Contact:   Good    Psychomotor Behavior: Normal    Attitude:   Cooperative    Orientation:   All   Speech    Rate / Production: Normal     Volume:  Normal    Mood:    Normal   Affect:    Appropriate    Thought Content:  Clear    Thought Form:  Coherent  Logical    Insight:    Good      Medication Review:   No changes to current psychiatric medication(s)     Medication Compliance:   Yes     Changes in Health Issues:   None  reported     Chemical Use Review:   Substance Use: Chemical use reviewed, no active concerns identified      Nicotine Use: No current tobacco use.      Diagnosis:  1. Bipolar 2 disorder (H)    2. Generalized anxiety disorder    3. Trauma and stressor-related disorder        PLAN:   Recommendations are outlined in full evaluation report (attached to this encounter).   Patient indicated understanding and will contact the clinic if there are further questions.    Report routed to referring provider.    Parts of this documentation may have been completed using dictation software. Potential errors may result and are unintentional.       Ivy Wagner PsyD,   Clinical Psychologist         Psychological Testing Services Summary       Testing Evaluation Services Base: 36872  (first 60 mins) Add-on: 20931  (each addtl 60 mins)   Record Review and Clarify Referral Question   8:50am-9:00am on 1/19/2022 10 minutes   Clinical Decision Making/Battery Modification   1:45pm-2:00pm on 1/26/2022 15 minutes   Integration/Report Generation   1:00pm-1:45pm on 2/3/2022 (MMPI-2)  1:45pm-2:45pm on 2/3/2022 (Final Report)   45 minutes  60 minutes   Interactive Feedback Session   2:10pm-3:08pm on 2/7/2022 58 minutes   Post-Service Work   3:08pm-3:23pm on 2/7/2022 15 minutes   Total Time: 203 minutes   Total Units: 1 2       Diagnoses:   F31.81 Bipolar II disorder  F41.1 Generalized anxiety disorder  F43.9 Unspecified trauma disorder

## 2022-02-08 ENCOUNTER — VIRTUAL VISIT (OUTPATIENT)
Dept: PSYCHOLOGY | Facility: CLINIC | Age: 67
End: 2022-02-08
Payer: MEDICARE

## 2022-02-08 ENCOUNTER — TELEPHONE (OUTPATIENT)
Dept: FAMILY MEDICINE | Facility: CLINIC | Age: 67
End: 2022-02-08

## 2022-02-08 DIAGNOSIS — F41.1 GENERALIZED ANXIETY DISORDER: ICD-10-CM

## 2022-02-08 DIAGNOSIS — F31.81 BIPOLAR 2 DISORDER (H): Primary | ICD-10-CM

## 2022-02-08 DIAGNOSIS — F43.9 TRAUMA AND STRESSOR-RELATED DISORDER: ICD-10-CM

## 2022-02-08 PROCEDURE — 90834 PSYTX W PT 45 MINUTES: CPT | Mod: 95 | Performed by: SOCIAL WORKER

## 2022-02-08 NOTE — TELEPHONE ENCOUNTER
Reason for Call:  Other call back    Detailed comments: Pt calling just to followup    Phone Number Patient can be reached at: Cell number on file:    Telephone Information:   Mobile [phone]       Best Time: anytime    Can we leave a detailed message on this number? YES    Call taken on 2/8/2022 at 3:30 PM by Alissa Negron

## 2022-02-08 NOTE — PROGRESS NOTES
Progress Note    Patient Name: Randi Cleary  Date: 2/8/22         Service Type: Individual      Session Start Time: 8:42 AM  Session End Time: 9:20 AM     Session Length: 38 minutes     Session #: 85    Attendees: Client attended alone    Service Modality:  Video Visit:    Telemedicine Visit: The patient's condition can be safely assessed and treated via synchronous audio and visual telemedicine encounter.      Reason for Telemedicine Visit: Services only offered telehealth    Originating Site (Patient Location): Patient's home    Distant Site (Provider Location): Provider Remote Setting- Home Office    Consent:  The patient/guardian has verbally consented to: the potential risks and benefits of telemedicine (video visit) versus in person care; bill my insurance or make self-payment for services provided; and responsibility for payment of non-covered services.     Mode of Communication:  Video Conference via [employer]    As the provider I attest to compliance with applicable laws and regulations related to telemedicine.      Treatment Plan Last Reviewed: 12/10/21  PHQ-9 / CHAVO-7 :   PHQ 2/1/2022 2/3/2022 2/8/2022   PHQ-9 Total Score 15 14 15   Q9: Thoughts of better off dead/self-harm past 2 weeks Not at all Not at all Not at all   F/U: Thoughts of suicide or self-harm - - -   F/U: Self harm-plan - - -   F/U: Self-harm action - - -   F/U: Safety concerns - - -   Some encounter information is confidential and restricted. Go to Review Flowsheets activity to see all data.     CHAVO-7 SCORE 2/3/2022 2/3/2022 2/3/2022   Total Score - - 15 (severe anxiety)   Total Score 15 15 15   Some encounter information is confidential and restricted. Go to Review Flowsheets activity to see all data.         DATA  Extended Session (53+ minutes): No  Interactive Complexity: No  Crisis: No      Progress Since Last Session (Related to Symptoms / Goals / Homework):   Symptoms: Improving overall,  more hope     Homework: Achieved / completed to satisfaction  Continue journaling- journal about the part that puts up walls and the part that is sensitive to rejection     Episode of Care Goals: Minimal progress - ACTION (Actively working towards change); Intervened by reinforcing change plan / affirming steps taken     Current / Ongoing Stressors and Concerns:   Patient is currently socially isolated. She has a conflictual relationship with her .  She is getting minimal physical activity.  She had recent eye surgery     Treatment Objective(s) Addressed in This Session:   Patient will increase frequency of engaging her in ADLs.  Patient will track and record at least 5 pleasant exchanges with . Patient will be able to identify at least 5 positive traits about her .  Patient will reduce level of depressive and anxious features as evidenced by reduction in score on her CHAVO-7 and PHQ-9 (scores of 15 and 16 at first measurment, respectively).     Intervention:   Supportive Therapy: Discussed doctor's appointment and friend helping her out with this. Discussed problem solving and hope she gives herself by engaging in problem solving. Talked about socialization and how restorative this was for her. Discussed maintaining this while doing trauma work as this is an important support. Reviewed psychological testing results.    ASSESSMENT: Current Emotional / Mental Status (status of significant symptoms):   Risk status (Self / Other harm or suicidal ideation)   Patient denies current fears or concerns for personal safety.   Patient denies current or recent suicidal ideation or behaviors.   Patient denies current or recent homicidal ideation or behaviors.   Patient denies current or recent self injurious behavior or ideation.   Patient denies other safety concerns.   Patient reports there has been no change in risk factors since their last session.     Patient reports there has been no change in  protective factors since their last session.     A safety and risk management plan has been developed including: Patient consented to co-developed safety plan.  Safety and risk management plan was completed.  Patient agreed to use safety plan should any safety concerns arise.  A copy was given to the patient at a past session, on 2020, and a copy is attached to the bottom of this note.      Appearance:   Appropriate    Eye Contact:   Fair    Psychomotor Behavior: Normal    Attitude:   Cooperative    Orientation:   All   Speech    Rate / Production: Normal/ Responsive    Volume:  Normal    Mood:    hopeful   Affect:    Bright    Thought Content:  Clear    Thought Form:  Coherent    Insight:    Fair      Medication Review:   No changes to current psychiatric medication(s)      Medication Compliance:   Yes     Changes in Health Issues:   None reported     Chemical Use Review:   Substance Use: decrease in use.  Patient reports frequency of use none since the third week of August.  Provided encouragement towards sobriety        Tobacco Use: No current tobacco use.      Diagnosis:  1. Bipolar 2 disorder (H)    2. Generalized anxiety disorder    3. Trauma and stressor-related disorder        Collateral Reports Completed:   Not Applicable    PLAN: (Patient Tasks / Therapist Tasks / Other)  Continue with IFS  Journal after sessions    Continue journaling- journal about the part that puts up walls and the part that is sensitive to rejection          MICAELA SLADE    2022                                                         ______________________________________________________________________    Treatment Plan    Patient's Name: Randi Cleary  YOB: 1955    Date: 12/10/21    DSM5 Diagnoses: 296.32 (F33.1) Major Depressive Disorder, Recurrent Episode, Moderate With anxious distress  Psychosocial / Contextual Factors: Patient's entire family of origin has , she now has a  "sister-in-law and  as support.  Relationship with  is conflictual.  Patient is reporting increase in physical symptoms due to COVID-19.  Patient is off of pain medications.  WHODAS: 42    Referral / Collaboration:  Referral to another professional/service is not indicated at this time.     Anticipated number of session or this episode of care: 12-15      MeasurableTreatment Goal(s) related to diagnosis / functional impairment(s)  Goal 1: Patient will \"jumpstart, getting going with the things I need to be doing around the house as far as picking up, doing things, trying to do something every day.  Also to lessen the animosity between me and my .\"    I will know I've met my goal when my shoulders are fixed and I can see.      Objective #A (Patient Action)    Patient will increase frequency of engaging her in ADLs.  Status: Continued - Date(s): 12/10/21    Intervention(s)  Therapist will engage patient in CBT, specifically behavioral activation.    Objective #B  Patient will track and record at least 5 pleasant exchanges with . Patient will be able to identify at least 5 positive traits about her  and how he relates to her.  Status: Continued - Date(s): 12/10/21    Intervention(s)  Therapist will teach assertiveness skills and assign homework related to relationship interactions.    Objective #C  Patient will reduce level of depressive and anxious features as evidenced by reduction in score on her CHAVO-7 and PHQ-9 (scores of 15 and 16 at first measurment, respectively).  Status: Continued - Date(s):  12/10/21    Intervention(s)  Therapist will engage patient in person-centered therapy and CBT.    Patient has reviewed and agreed to the above plan.      MICAELA SLADE  December 10, 2021                                                Randi Cleary          SAFETY PLAN:  Step 1: Warning signs / cues (Thoughts, images, mood, situation, behavior) that a crisis may be " "developing:  ? Thoughts: \"I don't want to continue\" \"I am unwanted\"  ? Images: none  ? Thinking Processes: ruminating  ? Mood: anger  ? Behaviors: isolating/withdrawing , can't stop crying, not taking care of myself and not taking care of my responsibilities  ? Situations: small triggers, such as not being able to find something, or dropping something   Step 2: Coping strategies - Things I can do to take my mind off of my problems without contacting another person (relaxation technique, physical activity):  ? Distress Tolerance Strategies:  arts and crafts: drawing, play with my pet , listen to positive and upbeat music: any, change body temperature (ice pack/cold water)  and paced breathing/progressive muscle relaxation  ? Physical Activities: go for a walk, deep breathing and stretching   ? Focus on helpful thoughts:  \"You've been through this before, you can get through it again.\"  Step 3: People and social settings that provide distraction:                 Name: Carmen                            Name: Darien                           Name: Aleida       ? pool, shopping, Carmen's house, [employer]       Step 4: Remind myself of people and things that are important to me and worth living for:  Clifford Little Donna, post-COVID world, options of what could be in your future        Step 5: When I am in crisis, I can ask these people to help me use my safety plan:                 Name: Sidney  Step 6: Making the environment safe:   ? go to sleep/daydream  Step 7: Professionals or agencies I can contact during a crisis:  ? [facility] Daytime Number: [phone]  ? Suicide Prevention Lifeline: 0-364-896-BHUQ (2434)  ? Crisis Text Line Service (available 24 hours a day, 7 days a week): Text [place] to 486153    Local Crisis Services: [facility] Crisis: [phone]     Call 911 or go to my nearest emergency department.       I helped develop this safety plan and agree to use it when needed.  I have been given a " copy of this plan.       Client signature _________________________________________________________________  Today s date:  11/24/2020  Adapted from Safety Plan Template 2008 Randi Poole and Robby Barba is reprinted with the express permission of the authors.  No portion of the Safety Plan Template may be reproduced without the express, written permission.  You can contact the authors at [email or web address] or [email or web address].

## 2022-02-09 ENCOUNTER — TELEPHONE (OUTPATIENT)
Dept: FAMILY MEDICINE | Facility: CLINIC | Age: 67
End: 2022-02-09
Payer: MEDICARE

## 2022-02-09 ASSESSMENT — PATIENT HEALTH QUESTIONNAIRE - PHQ9: SUM OF ALL RESPONSES TO PHQ QUESTIONS 1-9: 15

## 2022-02-09 NOTE — TELEPHONE ENCOUNTER
Reason for Call:  Cassidy is calling for verbal PT orders to end today. Patient is going outpatient.    Detailed comments: please call back with verbal order.    Phone Number Patient can be reached at: Other phone number:  [phone]    Best Time: any    Can we leave a detailed message on this number? YES    Call taken on 2/9/2022 at 8:56 AM by Zayra Peterson

## 2022-02-10 ENCOUNTER — VIRTUAL VISIT (OUTPATIENT)
Dept: PSYCHOLOGY | Facility: CLINIC | Age: 67
End: 2022-02-10
Payer: MEDICARE

## 2022-02-10 DIAGNOSIS — F41.1 GENERALIZED ANXIETY DISORDER: ICD-10-CM

## 2022-02-10 DIAGNOSIS — F43.9 TRAUMA AND STRESSOR-RELATED DISORDER: ICD-10-CM

## 2022-02-10 DIAGNOSIS — M54.12 CERVICAL RADICULOPATHY: ICD-10-CM

## 2022-02-10 DIAGNOSIS — G95.20 CORD COMPRESSION (H): Chronic | ICD-10-CM

## 2022-02-10 DIAGNOSIS — M47.12 CERVICAL SPONDYLOSIS WITH MYELOPATHY: ICD-10-CM

## 2022-02-10 DIAGNOSIS — F31.81 BIPOLAR 2 DISORDER (H): Primary | ICD-10-CM

## 2022-02-10 PROCEDURE — 90834 PSYTX W PT 45 MINUTES: CPT | Mod: 95 | Performed by: SOCIAL WORKER

## 2022-02-10 RX ORDER — METHOCARBAMOL 500 MG/1
500 TABLET, FILM COATED ORAL 4 TIMES DAILY
Qty: 30 TABLET | Refills: 0 | Status: SHIPPED | OUTPATIENT
Start: 2022-02-10 | End: 2022-02-17

## 2022-02-10 RX ORDER — HYDROCODONE BITARTRATE AND ACETAMINOPHEN 5; 325 MG/1; MG/1
1 TABLET ORAL 3 TIMES DAILY PRN
Qty: 42 TABLET | Refills: 0 | Status: SHIPPED | OUTPATIENT
Start: 2022-02-10 | End: 2022-02-25

## 2022-02-10 ASSESSMENT — PATIENT HEALTH QUESTIONNAIRE - PHQ9
SUM OF ALL RESPONSES TO PHQ QUESTIONS 1-9: 18
10. IF YOU CHECKED OFF ANY PROBLEMS, HOW DIFFICULT HAVE THESE PROBLEMS MADE IT FOR YOU TO DO YOUR WORK, TAKE CARE OF THINGS AT HOME, OR GET ALONG WITH OTHER PEOPLE: VERY DIFFICULT
SUM OF ALL RESPONSES TO PHQ QUESTIONS 1-9: 18

## 2022-02-10 NOTE — PROGRESS NOTES
Progress Note    Patient Name: Randi Cleary  Date: 2/10/22         Service Type: Individual      Session Start Time: 2:00 PM  Session End Time: 2:52 PM     Session Length: 52 minutes     Session #: 86    Attendees: Client attended alone    Service Modality:  Video Visit:    Telemedicine Visit: The patient's condition can be safely assessed and treated via synchronous audio and visual telemedicine encounter.      Reason for Telemedicine Visit: Services only offered telehealth    Originating Site (Patient Location): Patient's home    Distant Site (Provider Location): Provider Remote Setting- Home Office    Consent:  The patient/guardian has verbally consented to: the potential risks and benefits of telemedicine (video visit) versus in person care; bill my insurance or make self-payment for services provided; and responsibility for payment of non-covered services.     Mode of Communication:  Video Conference via [employer]    As the provider I attest to compliance with applicable laws and regulations related to telemedicine.      Treatment Plan Last Reviewed: 12/10/21  PHQ-9 / CHAVO-7 :   PHQ 2/3/2022 2/8/2022 2/10/2022   PHQ-9 Total Score 14 15 18   Q9: Thoughts of better off dead/self-harm past 2 weeks Not at all Not at all Several days   F/U: Thoughts of suicide or self-harm - - No   F/U: Self harm-plan - - -   F/U: Self-harm action - - -   F/U: Safety concerns - - No   Some encounter information is confidential and restricted. Go to Review Flowsheets activity to see all data.     CHAVO-7 SCORE 2/3/2022 2/3/2022 2/3/2022   Total Score - - 15 (severe anxiety)   Total Score 15 15 15   Some encounter information is confidential and restricted. Go to Review Flowsheets activity to see all data.         DATA  Extended Session (53+ minutes): No  Interactive Complexity: No  Crisis: No      Progress Since Last Session (Related to Symptoms / Goals / Homework):   Symptoms: Improving  overall, more hope     Homework: Achieved / completed to satisfaction  Continue with IFS  Journal after sessions    Continue journaling- journal about the part that puts up walls and the part that is sensitive to rejection     Episode of Care Goals: Minimal progress - ACTION (Actively working towards change); Intervened by reinforcing change plan / affirming steps taken     Current / Ongoing Stressors and Concerns:   Patient is currently socially isolated. She has a conflictual relationship with her .  She is getting minimal physical activity.  She had recent eye surgery     Treatment Objective(s) Addressed in This Session:   Patient will increase frequency of engaging her in ADLs.  Patient will track and record at least 5 pleasant exchanges with . Patient will be able to identify at least 5 positive traits about her .  Patient will reduce level of depressive and anxious features as evidenced by reduction in score on her CHAVO-7 and PHQ-9 (scores of 15 and 16 at first measurment, respectively).     Intervention:   Supportive Therapy: Patient discussed what she had learned in PT about limitations and how to care for herself right now. Discussed her pain and how she's feeling frustrated and unseen as providers haven't seemed to acknowledge her pain and she's about to be out of pain medications. Talked about distractions she used to avoid focusing on the pain.     Looked at her history and looked at some positives in her history rather than some of the traumas. Talked about how this relates to some current relationships.    ASSESSMENT: Current Emotional / Mental Status (status of significant symptoms):   Risk status (Self / Other harm or suicidal ideation)   Patient denies current fears or concerns for personal safety.   Patient denies current or recent suicidal ideation or behaviors.   Patient denies current or recent homicidal ideation or behaviors.   Patient denies current or recent self injurious  behavior or ideation.   Patient denies other safety concerns.   Patient reports there has been no change in risk factors since their last session.     Patient reports there has been no change in protective factors since their last session.     A safety and risk management plan has been developed including: Patient consented to co-developed safety plan.  Safety and risk management plan was completed.  Patient agreed to use safety plan should any safety concerns arise.  A copy was given to the patient at a past session, on 11/24/2020, and a copy is attached to the bottom of this note.      Appearance:   Appropriate    Eye Contact:   Fair    Psychomotor Behavior: Normal    Attitude:   Cooperative    Orientation:   All   Speech    Rate / Production: Normal/ Responsive    Volume:  Normal    Mood:    hopeful   Affect:    Bright    Thought Content:  Clear    Thought Form:  Coherent    Insight:    Fair      Medication Review:   No changes to current psychiatric medication(s)      Medication Compliance:   Yes     Changes in Health Issues:   None reported     Chemical Use Review:   Substance Use: decrease in use.  Patient reports frequency of use none since the third week of August.  Provided encouragement towards sobriety        Tobacco Use: No current tobacco use.      Diagnosis:  1. Bipolar 2 disorder (H)    2. Generalized anxiety disorder    3. Trauma and stressor-related disorder        Collateral Reports Completed:   Not Applicable    PLAN: (Patient Tasks / Therapist Tasks / Other)  Continue with IFS  Journal after sessions    Continue journaling- journal about the part that puts up walls and the part that is sensitive to rejection          MICAELA SLADE    February 10, 2022                                                         ______________________________________________________________________    Treatment Plan    Patient's Name: Randi Cleary  YOB: 1955    Date: 12/10/21    DSM5 Diagnoses:  "296.32 (F33.1) Major Depressive Disorder, Recurrent Episode, Moderate With anxious distress  Psychosocial / Contextual Factors: Patient's entire family of origin has , she now has a sister-in-law and  as support.  Relationship with  is conflictual.  Patient is reporting increase in physical symptoms due to COVID-19.  Patient is off of pain medications.  WHODAS: 42    Referral / Collaboration:  Referral to another professional/service is not indicated at this time.     Anticipated number of session or this episode of care: 12-15      MeasurableTreatment Goal(s) related to diagnosis / functional impairment(s)  Goal 1: Patient will \"jumpstart, getting going with the things I need to be doing around the house as far as picking up, doing things, trying to do something every day.  Also to lessen the animosity between me and my .\"    I will know I've met my goal when my shoulders are fixed and I can see.      Objective #A (Patient Action)    Patient will increase frequency of engaging her in ADLs.  Status: Continued - Date(s): 12/10/21    Intervention(s)  Therapist will engage patient in CBT, specifically behavioral activation.    Objective #B  Patient will track and record at least 5 pleasant exchanges with . Patient will be able to identify at least 5 positive traits about her  and how he relates to her.  Status: Continued - Date(s): 12/10/21    Intervention(s)  Therapist will teach assertiveness skills and assign homework related to relationship interactions.    Objective #C  Patient will reduce level of depressive and anxious features as evidenced by reduction in score on her CHAVO-7 and PHQ-9 (scores of 15 and 16 at first measurment, respectively).  Status: Continued - Date(s):  12/10/21    Intervention(s)  Therapist will engage patient in person-centered therapy and CBT.    Patient has reviewed and agreed to the above plan.      MICAELA SLADE  December 10, 2021                 " "                               Randi RIVERA Cathy Evni          SAFETY PLAN:  Step 1: Warning signs / cues (Thoughts, images, mood, situation, behavior) that a crisis may be developing:  ? Thoughts: \"I don't want to continue\" \"I am unwanted\"  ? Images: none  ? Thinking Processes: ruminating  ? Mood: anger  ? Behaviors: isolating/withdrawing , can't stop crying, not taking care of myself and not taking care of my responsibilities  ? Situations: small triggers, such as not being able to find something, or dropping something   Step 2: Coping strategies - Things I can do to take my mind off of my problems without contacting another person (relaxation technique, physical activity):  ? Distress Tolerance Strategies:  arts and crafts: drawing, play with my pet , listen to positive and upbeat music: any, change body temperature (ice pack/cold water)  and paced breathing/progressive muscle relaxation  ? Physical Activities: go for a walk, deep breathing and stretching   ? Focus on helpful thoughts:  \"You've been through this before, you can get through it again.\"  Step 3: People and social settings that provide distraction:                 Name: Carmen                            Name: Darien                           Name: Aleida       ? pool, shopping, Carmen's house, [employer]       Step 4: Remind myself of people and things that are important to me and worth living for:  Clifford Little Donna, post-COVID world, options of what could be in your future        Step 5: When I am in crisis, I can ask these people to help me use my safety plan:                 Name: Sidney  Step 6: Making the environment safe:   ? go to sleep/daydream  Step 7: Professionals or agencies I can contact during a crisis:  ? [facility] Number: [phone]  ? Suicide Prevention Lifeline: 9-340-810-TALK (8416)  ? Crisis Text Line Service (available 24 hours a day, 7 days a week): Text [place] to 677655    Local Crisis Services: [facility] " Crisis: [phone]     Call 911 or go to my nearest emergency department.       I helped develop this safety plan and agree to use it when needed.  I have been given a copy of this plan.       Client signature _________________________________________________________________  Today s date:  11/24/2020  Adapted from Safety Plan Template 2008 Randi Poole and Robby Barba is reprinted with the express permission of the authors.  No portion of the Safety Plan Template may be reproduced without the express, written permission.  You can contact the authors at [email or web address] or [email or web address].

## 2022-02-10 NOTE — PROGRESS NOTES
Patient calls reviewing her recovery from physical therapy.  Her  is gone during the day, she has not been able to get anybody recommend to help with ADLs or other support.  Reviewed this is been a case of Covid.  Physical therapy has come in.  She is told not to wear the brace as much now but notes pain into her shoulder and neck.    I do see the note from neurosurgery indicating that they consider her through the global.  Any ongoing hydrocodone would come through me.  She was taking hydrocodone 2-3 times a day before the surgery and will resume that for the next couple weeks.  She does have an appointment coming up.  Also request methocarbamol

## 2022-02-11 ASSESSMENT — PATIENT HEALTH QUESTIONNAIRE - PHQ9: SUM OF ALL RESPONSES TO PHQ QUESTIONS 1-9: 18

## 2022-02-15 ENCOUNTER — VIRTUAL VISIT (OUTPATIENT)
Dept: PSYCHOLOGY | Facility: CLINIC | Age: 67
End: 2022-02-15
Payer: MEDICARE

## 2022-02-15 DIAGNOSIS — F41.1 GENERALIZED ANXIETY DISORDER: ICD-10-CM

## 2022-02-15 DIAGNOSIS — F31.81 BIPOLAR 2 DISORDER (H): Primary | ICD-10-CM

## 2022-02-15 DIAGNOSIS — F43.9 TRAUMA AND STRESSOR-RELATED DISORDER: ICD-10-CM

## 2022-02-15 PROCEDURE — 90834 PSYTX W PT 45 MINUTES: CPT | Mod: 95 | Performed by: SOCIAL WORKER

## 2022-02-15 NOTE — PROGRESS NOTES
Progress Note    Patient Name: Randi Cleary  Date: 2/15/22         Service Type: Individual      Session Start Time: 8:33 AM  Session End Time: 9:20 AM     Session Length: 47 minutes     Session #: 87    Attendees: Client attended alone    Service Modality:  Video Visit:    Telemedicine Visit: The patient's condition can be safely assessed and treated via synchronous audio and visual telemedicine encounter.      Reason for Telemedicine Visit: Services only offered telehealth    Originating Site (Patient Location): Patient's home    Distant Site (Provider Location): Provider Remote Setting- Home Office    Consent:  The patient/guardian has verbally consented to: the potential risks and benefits of telemedicine (video visit) versus in person care; bill my insurance or make self-payment for services provided; and responsibility for payment of non-covered services.     Mode of Communication:  Video Conference via [employer]    As the provider I attest to compliance with applicable laws and regulations related to telemedicine.      Treatment Plan Last Reviewed: 12/10/21  PHQ-9 / CHAVO-7 :   PHQ 2/8/2022 2/10/2022 2/15/2022   PHQ-9 Total Score 15 18 15   Q9: Thoughts of better off dead/self-harm past 2 weeks Not at all Several days Not at all   F/U: Thoughts of suicide or self-harm - No -   F/U: Self harm-plan - - -   F/U: Self-harm action - - -   F/U: Safety concerns - No -   Some encounter information is confidential and restricted. Go to Review Flowsheets activity to see all data.     CHAVO-7 SCORE 2/3/2022 2/3/2022 2/3/2022   Total Score - - 15 (severe anxiety)   Total Score 15 15 15   Some encounter information is confidential and restricted. Go to Review Flowsheets activity to see all data.         DATA  Extended Session (53+ minutes): No  Interactive Complexity: No  Crisis: No      Progress Since Last Session (Related to Symptoms / Goals / Homework):   Symptoms: Improving  overall, more hope, despite frustration that she has spent money she now regrets     Homework: Achieved / completed to satisfaction  Continue with IFS  Journal after sessions    Continue journaling- journal about the part that puts up walls and the part that is sensitive to rejection     Episode of Care Goals: Minimal progress - ACTION (Actively working towards change); Intervened by reinforcing change plan / affirming steps taken     Current / Ongoing Stressors and Concerns:   Patient is currently socially isolated. She has a conflictual relationship with her .  She is getting minimal physical activity.  She had recent eye surgery     Treatment Objective(s) Addressed in This Session:   Patient will increase frequency of engaging her in ADLs.  Patient will track and record at least 5 pleasant exchanges with . Patient will be able to identify at least 5 positive traits about her .  Patient will reduce level of depressive and anxious features as evidenced by reduction in score on her CHAVO-7 and PHQ-9 (scores of 15 and 16 at first measurment, respectively).     Intervention:   Supportive Therapy: Internal Family Systems Therapy: Discussed having seen a friend and how this went. Looked at health challenges, specifically shoulders and how hard it has been to navigate having no primary provider right now, reviewed progress in finding a PCP.  Talked about finances and how things make sense at first, but then a few weeks later she looks looks back and feels like she's wasted her money. Looked at how she has moments where she feels like she wants to take care of thing and set herself up, not wanting to miss out on opportunities, which leads to spending. She notices in the future she finds herself upset and teary, feeling like a failure for having spent the money. Agreed this was a space to continue to explore.  Suggested an ciara to look at planning for spending rather than reacting to  spending.    ASSESSMENT: Current Emotional / Mental Status (status of significant symptoms):   Risk status (Self / Other harm or suicidal ideation)   Patient denies current fears or concerns for personal safety.   Patient denies current or recent suicidal ideation or behaviors.   Patient denies current or recent homicidal ideation or behaviors.   Patient denies current or recent self injurious behavior or ideation.   Patient denies other safety concerns.   Patient reports there has been no change in risk factors since their last session.     Patient reports there has been no change in protective factors since their last session.     A safety and risk management plan has been developed including: Patient consented to co-developed safety plan.  Safety and risk management plan was completed.  Patient agreed to use safety plan should any safety concerns arise.  A copy was given to the patient at a past session, on 11/24/2020, and a copy is attached to the bottom of this note.      Appearance:   Appropriate    Eye Contact:   Good    Psychomotor Behavior: Normal    Attitude:   Cooperative    Orientation:   All   Speech    Rate / Production: Normal/ Responsive    Volume:  Normal    Mood:    Normal   Affect:    Appropriate    Thought Content:  Clear    Thought Form:  Coherent    Insight:    Fair      Medication Review:   No changes to current psychiatric medication(s)      Medication Compliance:   Yes     Changes in Health Issues:   None reported     Chemical Use Review:   Substance Use: Chemical use reviewed, no active concerns identified . Nothing used since August 2021.     Tobacco Use: No current tobacco use.      Diagnosis:  1. Bipolar 2 disorder (H)    2. Generalized anxiety disorder    3. Trauma and stressor-related disorder        Collateral Reports Completed:   Not Applicable    PLAN: (Patient Tasks / Therapist Tasks / Other)  Consider journaling after sessions  Consider looking into You Need A Budget      Next  "session- work with this part of yourself that spends the money- not wanting to miss out.    In the future, return to: the part that puts up walls and the part that is sensitive to rejection          MICAELA SLADE JO    February 15, 2022                                                         ______________________________________________________________________    Treatment Plan    Patient's Name: Randi Cleary  YOB: 1955    Date: 12/10/21    DSM5 Diagnoses: 296.32 (F33.1) Major Depressive Disorder, Recurrent Episode, Moderate With anxious distress  Psychosocial / Contextual Factors: Patient's entire family of origin has , she now has a sister-in-law and  as support.  Relationship with  is conflictual.  Patient is reporting increase in physical symptoms due to COVID-19.  Patient is off of pain medications.  WHODAS: 42    Referral / Collaboration:  Referral to another professional/service is not indicated at this time.     Anticipated number of session or this episode of care: 12-15      MeasurableTreatment Goal(s) related to diagnosis / functional impairment(s)  Goal 1: Patient will \"jumpstart, getting going with the things I need to be doing around the house as far as picking up, doing things, trying to do something every day.  Also to lessen the animosity between me and my .\"    I will know I've met my goal when my shoulders are fixed and I can see.      Objective #A (Patient Action)    Patient will increase frequency of engaging her in ADLs.  Status: Continued - Date(s): 12/10/21    Intervention(s)  Therapist will engage patient in CBT, specifically behavioral activation.    Objective #B  Patient will track and record at least 5 pleasant exchanges with . Patient will be able to identify at least 5 positive traits about her  and how he relates to her.  Status: Continued - Date(s): 12/10/21    Intervention(s)  Therapist will teach assertiveness skills and " "assign homework related to relationship interactions.    Objective #C  Patient will reduce level of depressive and anxious features as evidenced by reduction in score on her CHAVO-7 and PHQ-9 (scores of 15 and 16 at first measurment, respectively).  Status: Continued - Date(s):  12/10/21    Intervention(s)  Therapist will engage patient in person-centered therapy and CBT.    Patient has reviewed and agreed to the above plan.      MICAELA SLADE  December 10, 2021                                                Randi Cleary          SAFETY PLAN:  Step 1: Warning signs / cues (Thoughts, images, mood, situation, behavior) that a crisis may be developing:  ? Thoughts: \"I don't want to continue\" \"I am unwanted\"  ? Images: none  ? Thinking Processes: ruminating  ? Mood: anger  ? Behaviors: isolating/withdrawing , can't stop crying, not taking care of myself and not taking care of my responsibilities  ? Situations: small triggers, such as not being able to find something, or dropping something   Step 2: Coping strategies - Things I can do to take my mind off of my problems without contacting another person (relaxation technique, physical activity):  ? Distress Tolerance Strategies:  arts and crafts: drawing, play with my pet , listen to positive and upbeat music: any, change body temperature (ice pack/cold water)  and paced breathing/progressive muscle relaxation  ? Physical Activities: go for a walk, deep breathing and stretching   ? Focus on helpful thoughts:  \"You've been through this before, you can get through it again.\"  Step 3: People and social settings that provide distraction:                 Name: Carmen                            Name: Darien                           Name: Aleida       ? pool, shopping, Carmen's house, [employer]       Step 4: Remind myself of people and things that are important to me and worth living for:  Clifford Little Donna, post-COVID world, options of what could be in your " future        Step 5: When I am in crisis, I can ask these people to help me use my safety plan:                 Name: Sidney  Step 6: Making the environment safe:   ? go to sleep/daydream  Step 7: Professionals or agencies I can contact during a crisis:  ? [facility] Number: [phone]  ? Suicide Prevention Lifeline: 2-648-545-TALK (5423)  ? Crisis Text Line Service (available 24 hours a day, 7 days a week): Text [place] to 774462    Local Crisis Services: [facility] Crisis: [phone]     Call 911 or go to my nearest emergency department.       I helped develop this safety plan and agree to use it when needed.  I have been given a copy of this plan.       Client signature _________________________________________________________________  Today s date:  11/24/2020  Adapted from Safety Plan Template 2008 Randi Poole and Robby Barba is reprinted with the express permission of the authors.  No portion of the Safety Plan Template may be reproduced without the express, written permission.  You can contact the authors at [email or web address] or [email or web address].

## 2022-02-16 ASSESSMENT — PATIENT HEALTH QUESTIONNAIRE - PHQ9: SUM OF ALL RESPONSES TO PHQ QUESTIONS 1-9: 15

## 2022-02-17 ENCOUNTER — VIRTUAL VISIT (OUTPATIENT)
Dept: PALLIATIVE MEDICINE | Facility: OTHER | Age: 67
End: 2022-02-17
Payer: MEDICARE

## 2022-02-17 ENCOUNTER — MEDICAL CORRESPONDENCE (OUTPATIENT)
Dept: HEALTH INFORMATION MANAGEMENT | Facility: CLINIC | Age: 67
End: 2022-02-17

## 2022-02-17 DIAGNOSIS — M54.12 CERVICAL RADICULOPATHY: ICD-10-CM

## 2022-02-17 DIAGNOSIS — G25.81 RESTLESS LEGS SYNDROME (RLS): Primary | ICD-10-CM

## 2022-02-17 DIAGNOSIS — G95.20 CORD COMPRESSION (H): Chronic | ICD-10-CM

## 2022-02-17 DIAGNOSIS — M47.12 CERVICAL SPONDYLOSIS WITH MYELOPATHY: ICD-10-CM

## 2022-02-17 PROBLEM — I27.20 PULMONARY HYPERTENSION (H): Status: ACTIVE | Noted: 2019-09-24

## 2022-02-17 PROCEDURE — 99214 OFFICE O/P EST MOD 30 MIN: CPT | Mod: 95 | Performed by: ANESTHESIOLOGY

## 2022-02-17 RX ORDER — METHOCARBAMOL 500 MG/1
500 TABLET, FILM COATED ORAL 4 TIMES DAILY
Qty: 30 TABLET | Refills: 0 | Status: SHIPPED | OUTPATIENT
Start: 2022-02-17 | End: 2022-03-04

## 2022-02-17 RX ORDER — ROPINIROLE 2 MG/1
2-4 TABLET, FILM COATED ORAL AT BEDTIME
Qty: 120 TABLET | Refills: 3 | Status: SHIPPED | OUTPATIENT
Start: 2022-02-17 | End: 2022-08-12

## 2022-02-17 ASSESSMENT — PAIN SCALES - GENERAL: PAINLEVEL: EXTREME PAIN (8)

## 2022-02-17 NOTE — PROGRESS NOTES
Winnie is a 66 year old who is being evaluated via a billable video visit.      How would you like to obtain your AVS? [employer]MobsharWengo  If the video visit is dropped, the invitation should be resent by: Text to cell phone: [phone]   Will anyone else be joining your video visit? No        Video-Visit Details    Type of service:  Video Visit        Originating Location (pt. Location): Home    Distant Location (provider location):  [facility]     Platform used for Video Visit: Other: on AKTHART        Is Pt currently in [place]? Yes    NOTE:  If Pt is not in [place], Appointment needs to be canceled and rescheduled.      PEG Score 1/20/2022 2/17/2022 2/17/2022   PEG Total Score 9 7.33 8       JON Brown ([facility]).................... February 17, 2022  11:23 AM

## 2022-02-17 NOTE — LETTER
2/17/2022         RE: Randi Cleary  [address] [facility] [address]        Dear Colleague,    Thank you for referring your patient, Randi Cleary, to the [facility]. Please see a copy of my visit note below.              Winnie is a 66 year old who is being evaluated via a billable video visit.      How would you like to obtain your AVS? MyChart  If the video visit is dropped, the invitation should be resent by: Text to cell phone: [phone]   Will anyone else be joining your video visit? No        Video-Visit Details    Type of service:  Video Visit        Originating Location (pt. Location): Home    Distant Location (provider location):  [facility]     Platform used for Video Visit: Other: on MYCHART        Is Pt currently in [place]? Yes    NOTE:  If Pt is not in [place], Appointment needs to be canceled and rescheduled.      PEG Score 1/20/2022 2/17/2022 2/17/2022   PEG Total Score 9 7.33 8       JON Brown (AAMA).................... February 17, 2022  11:23 AM      Winnie is a 66 year old who is being evaluated via a billable video visit.      How would you like to obtain your AVS?   If the video visit is dropped, the invitation should be resent by:   Will anyone else be joining your video visit?       Video Start Time: 11:10        Subjective   Winnie is a 66 year old who presents for the following health issues: For mood disorder, with recent cervical surgery.  HPI     She reviews continued pain in the back of her neck.  She will make an appoint to follow-up with her surgeon.  She did have one physical therapy appointment.  They told her she has been doing too much activity at home.  She has wearing her brace.  She notes she has limited help, has not been able to get any PCA people to come in, friends,  works long hours.    She continues talking with her psychotherapist.  Reviews was not able to be in touch with her previous  primary care provider when offices change so has gone to a different one in [place] but does not know her history as well.    She continues taking the hydrocodone 2-3 times a day.    She has been using the oxcarbazepine 1/2 tablet at night.  She notes the hydroxyzine seemed to cause a paradoxical effect make her more angry.    She takes her medicine for restless leg, and the methocarbamol 4 times a day.    She notes she has been sober, has not been driving.    Reviews having some neuropsychological testing, this showed bipolar disorder type II and anxiety.    She is trying to do some exercises and stretching at home.    Reviewed her history of serotonin syndrome so has been cautious about medications.  We discussed lithium orotate, over-the-counter as she may tolerate a lower dose with some benefit crossing the blood-brain barrier.  She has had some apprehension.  Discuss starting at lower doses.      Current Outpatient Medications:      acetaminophen (TYLENOL) 325 MG tablet, Take 2 tablets (650 mg) by mouth every 4 hours as needed for other (For optimal non-opioid multimodal pain management to improve pain control.), Disp: , Rfl:      albuterol (PROVENTIL) (2.5 MG/3ML) 0.083% neb solution, Take 1 vial (2.5 mg) by nebulization every 4 hours as needed for shortness of breath / dyspnea or wheezing, Disp: 360 mL, Rfl: 5     albuterol (VENTOLIN HFA) 108 (90 Base) MCG/ACT inhaler, Inhale 2 puffs into the lungs every 6 hours, Disp: 18 g, Rfl: 11     Cholecalciferol (VITAMIN D3) 250 MCG (48376 UT) TABS, Take 1 tablet by mouth daily 60341/day, Disp: , Rfl:      Cyanocobalamin (VITAMIN B-12) 5000 MCG SUBL, Place 2-3 sprays under the tongue daily Unknown dose. 2 or 3 sprays/day, Disp: , Rfl:      ethacrynic acid (EDECRIN) 25 MG tablet, Take 1 tablet by mouth on Saturday and Wednesday, Disp: 30 tablet, Rfl: 11     Fluticasone-Umeclidin-Vilanterol (TRELEGY ELLIPTA) 200-62.5-25 MCG/INH oral inhaler, Inhale 1 puff into the  lungs daily, Disp: 1 each, Rfl: 11     HYDROcodone-acetaminophen (NORCO) 5-325 MG tablet, Take 1 tablet by mouth 3 times daily as needed for breakthrough pain or moderate to severe pain, Disp: 42 tablet, Rfl: 0     hydrOXYzine (ATARAX) 25 MG tablet, Take 1 tablet (25 mg) by mouth every 6 hours as needed for anxiety, Disp: 30 tablet, Rfl: 0     Lidocaine (LIDOCARE) 4 % Patch, Place 2 patches onto the skin every 24 hours To prevent lidocaine toxicity, patient should be patch free for 12 hrs daily., Disp: 15 patch, Rfl: 0     losartan (COZAAR) 25 MG tablet, Take 1 tablet (25 mg) by mouth daily, Disp: 90 tablet, Rfl: 2     magnesium 250 MG tablet, Take 1 tablet by mouth 2 times daily, Disp: , Rfl:      medical cannabis (Patient's own supply), See Admin Instructions (The purpose of this order is to document that the patient reports taking medical cannabis.  This is not a prescription, and is not used to certify that the patient has a qualifying medical condition.), Disp: , Rfl:      methocarbamol (ROBAXIN) 500 MG tablet, Take 1 tablet (500 mg) by mouth 4 times daily Wean down on your frequency., Disp: 30 tablet, Rfl: 0     Multiple Vitamins-Iron (MULTIVITAMIN PLUS IRON ADULT) TABS, Take 4 tablets by mouth daily, Disp: 120 tablet, Rfl: 0     omeprazole (PRILOSEC) 20 MG DR capsule, Take 20 mg by mouth daily , Disp: , Rfl:      OXcarbazepine (TRILEPTAL) 150 MG tablet, One-half tab bedtime 3 nights, one-half tab twice a day 5 days, one twice a day (Patient taking differently: Take 75 mg by mouth daily ), Disp: 30 tablet, Rfl: 3     polyethylene glycol (MIRALAX) 17 g packet, Take 17 g by mouth daily, Disp: , Rfl:      potassium chloride ER (KLOR-CON M) 20 MEQ CR tablet, Take 1 tablet (20 mEq) by mouth daily, Disp: 90 tablet, Rfl: 3     PREBIOTIC PRODUCT PO, Take by mouth daily , Disp: , Rfl:      rOPINIRole (REQUIP) 2 MG tablet, Take 1-2 tablets (2-4 mg) by mouth At Bedtime, Disp: 120 tablet, Rfl: 3     SYNTHROID 150 MCG  tablet, Take 1 tablet (150 mcg) by mouth daily, Disp: 90 tablet, Rfl: 4     Turmeric Curcumin 500 MG CAPS, Take 500 mg by mouth daily , Disp: , Rfl:      vitamin (B COMPLEX-C) tablet, Take 1 tablet by mouth daily, Disp: , Rfl:      vitamin E 400 units TABS, Take 800 Units by mouth daily, Disp: , Rfl:     By video alert, clear sensorium.  Thought process logical.  Affect is restricted, no respiratory distress.    Review of Systems         Objective           Vitals:  No vitals were obtained today due to virtual visit.    Physical Exam       Assessment: Bipolar spectrum disorder with anxiety.  Continues working with her psychotherapist.  We discussed increasing oxcarbazepine and adding back low-dose lithium orotate.    Total time more than 25 minutes            Video-Visit Details    Type of service:  Video Visit    Video End Time:11:28    Originating Location (pt. Location): home    Distant Location (provider location):  [facility]     Platform used for Video Visit: well      Again, thank you for allowing me to participate in the care of your patient.        Sincerely,        AXEL WIGGINS MD

## 2022-02-18 NOTE — PATIENT INSTRUCTIONS
PLAN:    Increase the oxcarbazepine to 1 and 50 mg tablet 1 whole tablet at bedtime.    Add lithium orotate 5 mg at bedtime for 5 days may increase to 10 mg at bedtime.    Continue hydrocodone 5 mg up to 3 times a day.    Follow-up Dr. Dubois in 6 weeks

## 2022-02-18 NOTE — PROGRESS NOTES
Winnie is a 66 year old who is being evaluated via a billable video visit.      How would you like to obtain your AVS?   If the video visit is dropped, the invitation should be resent by:   Will anyone else be joining your video visit?       Video Start Time: 11:10        Subjective   Winnie is a 66 year old who presents for the following health issues: For mood disorder, with recent cervical surgery.  HPI     She reviews continued pain in the back of her neck.  She will make an appoint to follow-up with her surgeon.  She did have one physical therapy appointment.  They told her she has been doing too much activity at home.  She has wearing her brace.  She notes she has limited help, has not been able to get any PCA people to come in, friends,  works long hours.    She continues talking with her psychotherapist.  Reviews was not able to be in touch with her previous primary care provider when offices change so has gone to a different one in [place] but does not know her history as well.    She continues taking the hydrocodone 2-3 times a day.    She has been using the oxcarbazepine 1/2 tablet at night.  She notes the hydroxyzine seemed to cause a paradoxical effect make her more angry.    She takes her medicine for restless leg, and the methocarbamol 4 times a day.    She notes she has been sober, has not been driving.    Reviews having some neuropsychological testing, this showed bipolar disorder type II and anxiety.    She is trying to do some exercises and stretching at home.    Reviewed her history of serotonin syndrome so has been cautious about medications.  We discussed lithium orotate, over-the-counter as she may tolerate a lower dose with some benefit crossing the blood-brain barrier.  She has had some apprehension.  Discuss starting at lower doses.      Current Outpatient Medications:      acetaminophen (TYLENOL) 325 MG tablet, Take 2 tablets (650 mg) by mouth every 4 hours as needed for other (For  optimal non-opioid multimodal pain management to improve pain control.), Disp: , Rfl:      albuterol (PROVENTIL) (2.5 MG/3ML) 0.083% neb solution, Take 1 vial (2.5 mg) by nebulization every 4 hours as needed for shortness of breath / dyspnea or wheezing, Disp: 360 mL, Rfl: 5     albuterol (VENTOLIN HFA) 108 (90 Base) MCG/ACT inhaler, Inhale 2 puffs into the lungs every 6 hours, Disp: 18 g, Rfl: 11     Cholecalciferol (VITAMIN D3) 250 MCG (32096 UT) TABS, Take 1 tablet by mouth daily 24914/day, Disp: , Rfl:      Cyanocobalamin (VITAMIN B-12) 5000 MCG SUBL, Place 2-3 sprays under the tongue daily Unknown dose. 2 or 3 sprays/day, Disp: , Rfl:      ethacrynic acid (EDECRIN) 25 MG tablet, Take 1 tablet by mouth on Saturday and Wednesday, Disp: 30 tablet, Rfl: 11     Fluticasone-Umeclidin-Vilanterol (TRELEGY ELLIPTA) 200-62.5-25 MCG/INH oral inhaler, Inhale 1 puff into the lungs daily, Disp: 1 each, Rfl: 11     HYDROcodone-acetaminophen (NORCO) 5-325 MG tablet, Take 1 tablet by mouth 3 times daily as needed for breakthrough pain or moderate to severe pain, Disp: 42 tablet, Rfl: 0     hydrOXYzine (ATARAX) 25 MG tablet, Take 1 tablet (25 mg) by mouth every 6 hours as needed for anxiety, Disp: 30 tablet, Rfl: 0     Lidocaine (LIDOCARE) 4 % Patch, Place 2 patches onto the skin every 24 hours To prevent lidocaine toxicity, patient should be patch free for 12 hrs daily., Disp: 15 patch, Rfl: 0     losartan (COZAAR) 25 MG tablet, Take 1 tablet (25 mg) by mouth daily, Disp: 90 tablet, Rfl: 2     magnesium 250 MG tablet, Take 1 tablet by mouth 2 times daily, Disp: , Rfl:      medical cannabis (Patient's own supply), See Admin Instructions (The purpose of this order is to document that the patient reports taking medical cannabis.  This is not a prescription, and is not used to certify that the patient has a qualifying medical condition.), Disp: , Rfl:      methocarbamol (ROBAXIN) 500 MG tablet, Take 1 tablet (500 mg) by mouth 4  times daily Wean down on your frequency., Disp: 30 tablet, Rfl: 0     Multiple Vitamins-Iron (MULTIVITAMIN PLUS IRON ADULT) TABS, Take 4 tablets by mouth daily, Disp: 120 tablet, Rfl: 0     omeprazole (PRILOSEC) 20 MG DR capsule, Take 20 mg by mouth daily , Disp: , Rfl:      OXcarbazepine (TRILEPTAL) 150 MG tablet, One-half tab bedtime 3 nights, one-half tab twice a day 5 days, one twice a day (Patient taking differently: Take 75 mg by mouth daily ), Disp: 30 tablet, Rfl: 3     polyethylene glycol (MIRALAX) 17 g packet, Take 17 g by mouth daily, Disp: , Rfl:      potassium chloride ER (KLOR-CON M) 20 MEQ CR tablet, Take 1 tablet (20 mEq) by mouth daily, Disp: 90 tablet, Rfl: 3     PREBIOTIC PRODUCT PO, Take by mouth daily , Disp: , Rfl:      rOPINIRole (REQUIP) 2 MG tablet, Take 1-2 tablets (2-4 mg) by mouth At Bedtime, Disp: 120 tablet, Rfl: 3     SYNTHROID 150 MCG tablet, Take 1 tablet (150 mcg) by mouth daily, Disp: 90 tablet, Rfl: 4     Turmeric Curcumin 500 MG CAPS, Take 500 mg by mouth daily , Disp: , Rfl:      vitamin (B COMPLEX-C) tablet, Take 1 tablet by mouth daily, Disp: , Rfl:      vitamin E 400 units TABS, Take 800 Units by mouth daily, Disp: , Rfl:     By video alert, clear sensorium.  Thought process logical.  Affect is restricted, no respiratory distress.    Review of Systems         Objective           Vitals:  No vitals were obtained today due to virtual visit.    Physical Exam       Assessment: Bipolar spectrum disorder with anxiety.  Continues working with her psychotherapist.  We discussed increasing oxcarbazepine and adding back low-dose lithium orotate.    Total time more than 25 minutes            Video-Visit Details    Type of service:  Video Visit    Video End Time:11:28    Originating Location (pt. Location): home    Distant Location (provider location):  [facility]     Platform used for Video Visit: [employer]

## 2022-02-22 ENCOUNTER — MYC MEDICAL ADVICE (OUTPATIENT)
Dept: CARDIOLOGY | Facility: CLINIC | Age: 67
End: 2022-02-22
Payer: MEDICARE

## 2022-02-22 ENCOUNTER — VIRTUAL VISIT (OUTPATIENT)
Dept: PSYCHOLOGY | Facility: CLINIC | Age: 67
End: 2022-02-22
Payer: MEDICARE

## 2022-02-22 DIAGNOSIS — F31.81 BIPOLAR 2 DISORDER (H): Primary | ICD-10-CM

## 2022-02-22 DIAGNOSIS — F43.9 TRAUMA AND STRESSOR-RELATED DISORDER: ICD-10-CM

## 2022-02-22 DIAGNOSIS — F41.1 GENERALIZED ANXIETY DISORDER: ICD-10-CM

## 2022-02-22 PROCEDURE — 90834 PSYTX W PT 45 MINUTES: CPT | Mod: 95 | Performed by: SOCIAL WORKER

## 2022-02-22 ASSESSMENT — PATIENT HEALTH QUESTIONNAIRE - PHQ9
10. IF YOU CHECKED OFF ANY PROBLEMS, HOW DIFFICULT HAVE THESE PROBLEMS MADE IT FOR YOU TO DO YOUR WORK, TAKE CARE OF THINGS AT HOME, OR GET ALONG WITH OTHER PEOPLE: VERY DIFFICULT
10. IF YOU CHECKED OFF ANY PROBLEMS, HOW DIFFICULT HAVE THESE PROBLEMS MADE IT FOR YOU TO DO YOUR WORK, TAKE CARE OF THINGS AT HOME, OR GET ALONG WITH OTHER PEOPLE: VERY DIFFICULT
SUM OF ALL RESPONSES TO PHQ QUESTIONS 1-9: 14
SUM OF ALL RESPONSES TO PHQ QUESTIONS 1-9: 14
SUM OF ALL RESPONSES TO PHQ QUESTIONS 1-9: 17
SUM OF ALL RESPONSES TO PHQ QUESTIONS 1-9: 17

## 2022-02-22 NOTE — PATIENT INSTRUCTIONS
Consider journaling after sessions  Consider looking into You Need A Budget  Consider talking to Sidney about halfway

## 2022-02-22 NOTE — PROGRESS NOTES
Progress Note    Patient Name: Randi Cleary  Date: 2/22/22         Service Type: Individual      Session Start Time: 8:30 AM  Session End Time: 9:22 AM     Session Length: 52 minutes     Session #: 88    Attendees: Client attended alone    Service Modality:  Video Visit:    Telemedicine Visit: The patient's condition can be safely assessed and treated via synchronous audio and visual telemedicine encounter.      Reason for Telemedicine Visit: Services only offered telehealth    Originating Site (Patient Location): Patient's home    Distant Site (Provider Location): Provider Remote Setting- Home Office    Consent:  The patient/guardian has verbally consented to: the potential risks and benefits of telemedicine (video visit) versus in person care; bill my insurance or make self-payment for services provided; and responsibility for payment of non-covered services.     Mode of Communication:  Video Conference via Jumbas    As the provider I attest to compliance with applicable laws and regulations related to telemedicine.      Treatment Plan Last Reviewed: 12/10/21  PHQ-9 / CHAVO-7 :   PHQ 2/10/2022 2/15/2022 2/22/2022   PHQ-9 Total Score 18 15 14   Q9: Thoughts of better off dead/self-harm past 2 weeks Several days Not at all Several days   F/U: Thoughts of suicide or self-harm No - No   F/U: Self harm-plan - - -   F/U: Self-harm action - - -   F/U: Safety concerns No - No   Some encounter information is confidential and restricted. Go to Review Flowsheets activity to see all data.     CHAVO-7 SCORE 2/3/2022 2/3/2022 2/3/2022   Total Score - - 15 (severe anxiety)   Total Score 15 15 15   Some encounter information is confidential and restricted. Go to Review Flowsheets activity to see all data.         DATA  Extended Session (53+ minutes): No  Interactive Complexity: No  Crisis: No      Progress Since Last Session (Related to Symptoms / Goals / Homework):   Symptoms:  Improving overall, more hope     Homework: Achieved / completed to satisfaction  Consider journaling after sessions -not done  Consider looking into You Need A Budget -not done     Episode of Care Goals: Minimal progress - ACTION (Actively working towards change); Intervened by reinforcing change plan / affirming steps taken     Current / Ongoing Stressors and Concerns:   Patient is currently socially isolated. She has a conflictual relationship with her .  She is getting minimal physical activity.  She had recent eye surgery     Treatment Objective(s) Addressed in This Session:   Patient will increase frequency of engaging her in ADLs.  Patient will track and record at least 5 pleasant exchanges with . Patient will be able to identify at least 5 positive traits about her .  Patient will reduce level of depressive and anxious features as evidenced by reduction in score on her CHAVO-7 and PHQ-9 (scores of 15 and 16 at first measurment, respectively).     Intervention:   Supportive Therapy: Discussed doctor's appointments and next steps in care. Talked about financial concerns, including looking at trying to un-do some of the impulse investing she did. Talked about alf and finances, including how this impacts her and her . Reviewed homework and identified successes and barriers to completion.    ASSESSMENT: Current Emotional / Mental Status (status of significant symptoms):   Risk status (Self / Other harm or suicidal ideation)   Patient denies current fears or concerns for personal safety.   Patient denies current or recent suicidal ideation or behaviors.   Patient denies current or recent homicidal ideation or behaviors.   Patient denies current or recent self injurious behavior or ideation.   Patient denies other safety concerns.   Patient reports there has been no change in risk factors since their last session.     Patient reports there has been no change in protective factors  since their last session.     A safety and risk management plan has been developed including: Patient consented to co-developed safety plan.  Safety and risk management plan was completed.  Patient agreed to use safety plan should any safety concerns arise.  A copy was given to the patient at a past session, on 11/24/2020, and a copy is attached to the bottom of this note.      Appearance:   Appropriate    Eye Contact:   Good    Psychomotor Behavior: Normal    Attitude:   Cooperative    Orientation:   All   Speech    Rate / Production: Normal/ Responsive    Volume:  Normal    Mood:    Normal   Affect:    Appropriate    Thought Content:  Clear    Thought Form:  Coherent    Insight:    Fair      Medication Review:   No changes to current psychiatric medication(s)      Medication Compliance:   Yes     Changes in Health Issues:   None reported     Chemical Use Review:   Substance Use: Chemical use reviewed, no active concerns identified . Nothing used since August 2021.     Tobacco Use: No current tobacco use.      Diagnosis:  1. Bipolar 2 disorder (H)    2. Generalized anxiety disorder    3. Trauma and stressor-related disorder        Collateral Reports Completed:   Not Applicable    PLAN: (Patient Tasks / Therapist Tasks / Other)  Consider journaling after sessions  Consider looking into You Need A Budget  Consider talking to Sidney about [facility]      Next session- work with this part of yourself that spends the money- not wanting to miss out.    In the future, return to: the part that puts up walls and the part that is sensitive to rejection          MICAELA SLADE    February 22, 2022                                                         ______________________________________________________________________    Treatment Plan    Patient's Name: Randi Cleary  YOB: 1955    Date: 12/10/21    DSM5 Diagnoses: 296.32 (F33.1) Major Depressive Disorder, Recurrent Episode, Moderate With anxious  "distress  Psychosocial / Contextual Factors: Patient's entire family of origin has , she now has a sister-in-law and  as support.  Relationship with  is conflictual.  Patient is reporting increase in physical symptoms due to COVID-19.  Patient is off of pain medications.  WHODAS: 42    Referral / Collaboration:  Referral to another professional/service is not indicated at this time.     Anticipated number of session or this episode of care: 12-15      MeasurableTreatment Goal(s) related to diagnosis / functional impairment(s)  Goal 1: Patient will \"jumpstart, getting going with the things I need to be doing around the house as far as picking up, doing things, trying to do something every day.  Also to lessen the animosity between me and my .\"    I will know I've met my goal when my shoulders are fixed and I can see.      Objective #A (Patient Action)    Patient will increase frequency of engaging her in ADLs.  Status: Continued - Date(s): 12/10/21    Intervention(s)  Therapist will engage patient in CBT, specifically behavioral activation.    Objective #B  Patient will track and record at least 5 pleasant exchanges with . Patient will be able to identify at least 5 positive traits about her  and how he relates to her.  Status: Continued - Date(s): 12/10/21    Intervention(s)  Therapist will teach assertiveness skills and assign homework related to relationship interactions.    Objective #C  Patient will reduce level of depressive and anxious features as evidenced by reduction in score on her CHAVO-7 and PHQ-9 (scores of 15 and 16 at first measurment, respectively).  Status: Continued - Date(s):  12/10/21    Intervention(s)  Therapist will engage patient in person-centered therapy and CBT.    Patient has reviewed and agreed to the above plan.      MICAELA SLADE  December 10, 2021                                                Randi Cleary          SAFETY PLAN:  Step 1: " "Warning signs / cues (Thoughts, images, mood, situation, behavior) that a crisis may be developing:  ? Thoughts: \"I don't want to continue\" \"I am unwanted\"  ? Images: none  ? Thinking Processes: ruminating  ? Mood: anger  ? Behaviors: isolating/withdrawing , can't stop crying, not taking care of myself and not taking care of my responsibilities  ? Situations: small triggers, such as not being able to find something, or dropping something   Step 2: Coping strategies - Things I can do to take my mind off of my problems without contacting another person (relaxation technique, physical activity):  ? Distress Tolerance Strategies:  arts and crafts: drawing, play with my pet , listen to positive and upbeat music: any, change body temperature (ice pack/cold water)  and paced breathing/progressive muscle relaxation  ? Physical Activities: go for a walk, deep breathing and stretching   ? Focus on helpful thoughts:  \"You've been through this before, you can get through it again.\"  Step 3: People and social settings that provide distraction:                 Name: Carmen                            Name: Darien                           Name: Aleida       ? pool, shopping, Carmen's house, [employer]       Step 4: Remind myself of people and things that are important to me and worth living for:  Clifford Little Donna, post-COVID world, options of what could be in your future        Step 5: When I am in crisis, I can ask these people to help me use my safety plan:                 Name: Sidney  Step 6: Making the environment safe:   ? go to sleep/daydream  Step 7: Professionals or agencies I can contact during a crisis:  ? [facility] Daytime Number: [phone]  ? Suicide Prevention Lifeline: 1-684-656-TALK (7899)  ? Crisis Text Line Service (available 24 hours a day, 7 days a week): Text [place] to 904817    Local Crisis Services: [facility] Crisis: [phone]     Call 911 or go to my nearest emergency department.     "   I helped develop this safety plan and agree to use it when needed.  I have been given a copy of this plan.       Client signature _________________________________________________________________  Today s date:  11/24/2020  Adapted from Safety Plan Template 2008 Randi Poole and Robby Barba is reprinted with the express permission of the authors.  No portion of the Safety Plan Template may be reproduced without the express, written permission.  You can contact the authors at [email or web address] or [email or web address].

## 2022-02-23 ENCOUNTER — MEDICAL CORRESPONDENCE (OUTPATIENT)
Dept: HEALTH INFORMATION MANAGEMENT | Facility: CLINIC | Age: 67
End: 2022-02-23

## 2022-02-23 ENCOUNTER — OFFICE VISIT (OUTPATIENT)
Dept: INTERNAL MEDICINE | Facility: CLINIC | Age: 67
End: 2022-02-23
Payer: MEDICARE

## 2022-02-23 VITALS
BODY MASS INDEX: 36.98 KG/M2 | OXYGEN SATURATION: 96 % | WEIGHT: 229.1 LBS | HEART RATE: 88 BPM | SYSTOLIC BLOOD PRESSURE: 124 MMHG | DIASTOLIC BLOOD PRESSURE: 72 MMHG

## 2022-02-23 DIAGNOSIS — R73.03 PREDIABETES: ICD-10-CM

## 2022-02-23 DIAGNOSIS — E66.09 CLASS 2 OBESITY DUE TO EXCESS CALORIES WITHOUT SERIOUS COMORBIDITY WITH BODY MASS INDEX (BMI) OF 36.0 TO 36.9 IN ADULT: ICD-10-CM

## 2022-02-23 DIAGNOSIS — R06.09 DYSPNEA ON EXERTION: ICD-10-CM

## 2022-02-23 DIAGNOSIS — J42 CHRONIC BRONCHITIS, UNSPECIFIED CHRONIC BRONCHITIS TYPE (H): ICD-10-CM

## 2022-02-23 DIAGNOSIS — F10.21 ALCOHOL USE DISORDER, MODERATE, IN SUSTAINED REMISSION (H): ICD-10-CM

## 2022-02-23 DIAGNOSIS — E66.812 CLASS 2 OBESITY DUE TO EXCESS CALORIES WITHOUT SERIOUS COMORBIDITY WITH BODY MASS INDEX (BMI) OF 36.0 TO 36.9 IN ADULT: ICD-10-CM

## 2022-02-23 DIAGNOSIS — E53.8 VITAMIN B12 DEFICIENCY: ICD-10-CM

## 2022-02-23 DIAGNOSIS — E55.9 VITAMIN D DEFICIENCY: ICD-10-CM

## 2022-02-23 DIAGNOSIS — G47.33 OSA (OBSTRUCTIVE SLEEP APNEA): ICD-10-CM

## 2022-02-23 DIAGNOSIS — Z00.00 ENCOUNTER FOR MEDICAL EXAMINATION TO ESTABLISH CARE: Primary | ICD-10-CM

## 2022-02-23 DIAGNOSIS — Z98.890 H/O CERVICAL SPINE SURGERY: ICD-10-CM

## 2022-02-23 DIAGNOSIS — G89.4 CHRONIC PAIN SYNDROME: ICD-10-CM

## 2022-02-23 DIAGNOSIS — E83.119 HEMOCHROMATOSIS, UNSPECIFIED HEMOCHROMATOSIS TYPE: ICD-10-CM

## 2022-02-23 DIAGNOSIS — I27.20 PULMONARY HYPERTENSION (H): ICD-10-CM

## 2022-02-23 DIAGNOSIS — M54.2 CERVICAL SPINE PAIN: ICD-10-CM

## 2022-02-23 DIAGNOSIS — F31.81 BIPOLAR 2 DISORDER (H): ICD-10-CM

## 2022-02-23 DIAGNOSIS — E89.0 POSTABLATIVE HYPOTHYROIDISM: ICD-10-CM

## 2022-02-23 DIAGNOSIS — E61.1 IRON DEFICIENCY: ICD-10-CM

## 2022-02-23 LAB
ALBUMIN SERPL-MCNC: 3.8 G/DL (ref 3.5–5)
ALP SERPL-CCNC: 64 U/L (ref 45–120)
ALT SERPL W P-5'-P-CCNC: 20 U/L (ref 0–45)
ANION GAP SERPL CALCULATED.3IONS-SCNC: 12 MMOL/L (ref 5–18)
AST SERPL W P-5'-P-CCNC: 21 U/L (ref 0–40)
BASOPHILS # BLD AUTO: 0 10E3/UL (ref 0–0.2)
BASOPHILS NFR BLD AUTO: 1 %
BILIRUB SERPL-MCNC: 0.5 MG/DL (ref 0–1)
BUN SERPL-MCNC: 10 MG/DL (ref 8–22)
CALCIUM SERPL-MCNC: 9.6 MG/DL (ref 8.5–10.5)
CHLORIDE BLD-SCNC: 105 MMOL/L (ref 98–107)
CO2 SERPL-SCNC: 22 MMOL/L (ref 22–31)
CREAT SERPL-MCNC: 0.61 MG/DL (ref 0.6–1.1)
CREAT UR-MCNC: 43 MG/DL
EOSINOPHIL # BLD AUTO: 0.1 10E3/UL (ref 0–0.7)
EOSINOPHIL NFR BLD AUTO: 1 %
ERYTHROCYTE [DISTWIDTH] IN BLOOD BY AUTOMATED COUNT: 12.1 % (ref 10–15)
FERRITIN SERPL-MCNC: 78 NG/ML (ref 10–130)
GFR SERPL CREATININE-BSD FRML MDRD: >90 ML/MIN/1.73M2
GLUCOSE BLD-MCNC: 93 MG/DL (ref 70–125)
HBA1C MFR BLD: 5.5 % (ref 0–5.6)
HCT VFR BLD AUTO: 44.6 % (ref 35–47)
HGB BLD-MCNC: 15.4 G/DL (ref 11.7–15.7)
IMM GRANULOCYTES # BLD: 0 10E3/UL
IMM GRANULOCYTES NFR BLD: 0 %
IRON SATN MFR SERPL: 46 % (ref 15–46)
IRON SERPL-MCNC: 145 UG/DL (ref 35–180)
LYMPHOCYTES # BLD AUTO: 1.4 10E3/UL (ref 0.8–5.3)
LYMPHOCYTES NFR BLD AUTO: 23 %
MCH RBC QN AUTO: 32.8 PG (ref 26.5–33)
MCHC RBC AUTO-ENTMCNC: 34.5 G/DL (ref 31.5–36.5)
MCV RBC AUTO: 95 FL (ref 78–100)
MICROALBUMIN UR-MCNC: <0.5 MG/DL (ref 0–1.99)
MICROALBUMIN/CREAT UR: NORMAL MG/G{CREAT}
MONOCYTES # BLD AUTO: 0.5 10E3/UL (ref 0–1.3)
MONOCYTES NFR BLD AUTO: 8 %
NEUTROPHILS # BLD AUTO: 4 10E3/UL (ref 1.6–8.3)
NEUTROPHILS NFR BLD AUTO: 66 %
NT-PROBNP SERPL-MCNC: 92 PG/ML (ref 0–125)
PLATELET # BLD AUTO: 240 10E3/UL (ref 150–450)
POTASSIUM BLD-SCNC: 3.8 MMOL/L (ref 3.5–5)
PROT SERPL-MCNC: 6.9 G/DL (ref 6–8)
RBC # BLD AUTO: 4.69 10E6/UL (ref 3.8–5.2)
RETICS # AUTO: 0.1 10E6/UL (ref 0.01–0.11)
RETICS/RBC NFR AUTO: 2.1 % (ref 0.8–2.7)
SODIUM SERPL-SCNC: 139 MMOL/L (ref 136–145)
TIBC SERPL-MCNC: 317 UG/DL (ref 240–430)
TSH SERPL DL<=0.005 MIU/L-ACNC: 1.14 UIU/ML (ref 0.3–5)
VIT B12 SERPL-MCNC: 373 PG/ML (ref 213–816)
WBC # BLD AUTO: 6 10E3/UL (ref 4–11)

## 2022-02-23 PROCEDURE — 83036 HEMOGLOBIN GLYCOSYLATED A1C: CPT | Performed by: INTERNAL MEDICINE

## 2022-02-23 PROCEDURE — 80053 COMPREHEN METABOLIC PANEL: CPT | Performed by: INTERNAL MEDICINE

## 2022-02-23 PROCEDURE — 99215 OFFICE O/P EST HI 40 MIN: CPT | Performed by: INTERNAL MEDICINE

## 2022-02-23 PROCEDURE — 82607 VITAMIN B-12: CPT | Performed by: INTERNAL MEDICINE

## 2022-02-23 PROCEDURE — 36415 COLL VENOUS BLD VENIPUNCTURE: CPT | Performed by: INTERNAL MEDICINE

## 2022-02-23 PROCEDURE — 83550 IRON BINDING TEST: CPT | Performed by: INTERNAL MEDICINE

## 2022-02-23 PROCEDURE — 85045 AUTOMATED RETICULOCYTE COUNT: CPT | Performed by: INTERNAL MEDICINE

## 2022-02-23 PROCEDURE — 85025 COMPLETE CBC W/AUTO DIFF WBC: CPT | Performed by: INTERNAL MEDICINE

## 2022-02-23 PROCEDURE — 83880 ASSAY OF NATRIURETIC PEPTIDE: CPT | Performed by: INTERNAL MEDICINE

## 2022-02-23 PROCEDURE — 82043 UR ALBUMIN QUANTITATIVE: CPT | Performed by: INTERNAL MEDICINE

## 2022-02-23 PROCEDURE — 84443 ASSAY THYROID STIM HORMONE: CPT | Performed by: INTERNAL MEDICINE

## 2022-02-23 PROCEDURE — 82306 VITAMIN D 25 HYDROXY: CPT | Performed by: INTERNAL MEDICINE

## 2022-02-23 PROCEDURE — 82728 ASSAY OF FERRITIN: CPT | Performed by: INTERNAL MEDICINE

## 2022-02-23 RX ORDER — ETHACRYNIC ACID 25 MG/1
TABLET ORAL
Qty: 30 TABLET | Refills: 11
Start: 2022-02-23 | End: 2022-12-07

## 2022-02-23 ASSESSMENT — PATIENT HEALTH QUESTIONNAIRE - PHQ9
SUM OF ALL RESPONSES TO PHQ QUESTIONS 1-9: 14
SUM OF ALL RESPONSES TO PHQ QUESTIONS 1-9: 17

## 2022-02-23 NOTE — PROGRESS NOTES
Answers for HPI/ROS submitted by the patient on 2/22/2022  If you checked off any problems, how difficult have these problems made it for you to do your work, take care of things at home, or get along with other people?: Very difficult  PHQ9 TOTAL SCORE: 17  How many servings of fruits and vegetables do you eat daily?: 2-3  On average, how many sweetened beverages do you drink each day (Examples: soda, juice, sweet tea, etc.  Do NOT count diet or artificially sweetened beverages)?: 0  How many minutes a day do you exercise enough to make your heart beat faster?: 9 or less  How many days a week do you exercise enough to make your heart beat faster?: 3 or less  How many days per week do you miss taking your medication?: 0  What is the reason for your visit today?: consultation -surgery-labs  When did your symptoms begin?: More than a month  How would you describe these symptoms?: Moderate  Have you had these symptoms before?: Yes  Have you tried or received treatment for these symptoms before?: Yes  Is there anything that makes you feel worse?: over use  Is there anything that makes you feel better?: taking small steps, heat, massage    [facility] Follow Up Note    Assessment/Plan:  1. Encounter for medical examination to establish care  Winnie is well-known to me.  She is here today to reestablish care.  Of note, she was frustrated with the change in the handling of messaging at our clinic and switched to the [facility] near her home.  After couple of months, she decided that she would like to return to this clinic for management of her general internal medicine care.  She was last seen here in the fall of last year.  Those notes are reviewed      Current medical team members are as follows:  Dr. Edwards-pulmonary hypertension  Dr. Dubois-pain/mental health  Dr. Tuttle-hemochromatosis  Dr. Coker endocrinology  She also has a pulmonary physician.    She sees Dr. Angela Gregory for  "neurosurgical needs and Dr. Gab Moise for her orthopedic surgical needs.    2. Cervical spine pain  Winnie is status post recent cervical laminoplasty-with Dr. Gregory neurosurgery.  She continues to have postoperative pain and is working with Dr. Gregory's team as well as with physical therapy with regard to issues of discomfort, headache and tension in the sternocleidomastoid muscle on the left.  She has been told that she has been \"doing too much \".  She has been bending over and engaging in some activities that she is not yet ready for.  Pain management per pain clinic Dr. Dubois.  She is working with physical therapy    3. H/O cervical spine surgery  As noted above-notes from neurosurgical team reviewed    4. Prediabetes  History of diabetes-now prediabetes with improvement of weight and diet.  She is due for labs as noted below  - Albumin Random Urine Quantitative with Creat Ratio; Future  - Hemoglobin A1c; Future  - Albumin Random Urine Quantitative with Creat Ratio  - Hemoglobin A1c    5. Class 2 obesity due to excess calories without serious comorbidity with body mass index (BMI) of 36.0 to 36.9 in adult  Weight is up.  She is slowly resuming physical activities following surgery.  Will check labs.  - Albumin Random Urine Quantitative with Creat Ratio; Future  - Hemoglobin A1c; Future  - Albumin Random Urine Quantitative with Creat Ratio  - Hemoglobin A1c    6. Chronic bronchitis, unspecified chronic bronchitis type (H)  Stable-history of underlying COPD    7. Pulmonary hypertension (H)  Followed by Dr. Edwards at the .  Recommended labs from him  - BNP-N terminal pro; Future  - ethacrynic acid (EDECRIN) 25 MG tablet; Take 1 tablet by mouth on Saturday and Wednesday  Dispense: 30 tablet; Refill: 11  - BNP-N terminal pro    8. Hemochromatosis, unspecified hemochromatosis type  Notes reviewed from hematology.  Paradoxically she was on iron supplementation.  She is recently discontinued the same.  Labs as " below  - Comprehensive metabolic panel; Future  - CBC with Platelets & Differential; Future  - Ferritin; Future  - Reticulocyte count  - Comprehensive metabolic panel  - CBC with Platelets & Differential  - Ferritin    9. Vitamin B12 deficiency  Remote history of bariatric surgery.  We will update labs  - Vitamin B12; Future  - Vitamin B12    10. Bipolar 2 disorder (H)  Reassessment of mental health by diagnostic psychologist.  DSM confirmed the diagnoses are bipolar disorder/generalized anxiety disorder/trauma.  She is followed by Dr. Dubois and . [facility] psychologist    11. KYAW (obstructive sleep apnea)  She is not using sleep apnea machine    12. Chronic pain syndrome  Chronic pain per Dr. Dubois    13. Postablative hypothyroidism  Ablative hypothyroidism and is due for labs  - TSH with free T4 reflex; Future  - TSH with free T4 reflex    14. Iron deficiency  In the setting of hemochromatosis-due for labs  - Iron & Iron Binding Capacity; Future  - Ferritin; Future  - Iron & Iron Binding Capacity  - Ferritin    15. Alcohol use disorder, moderate, in sustained remission (H)  Had lapse in alcohol use disorder this past year.  Following with psychology and Dr. Dubois for psychiatric needs.  Last drink-December    16. Dyspnea on exertion   Chronic dyspnea on exertion-multifactorial/pulmonary hypertension/obesity/immobility/deconditioning/underlying COPD  Of note also-she had Covid in June/etc.  - BNP-N terminal pro; Future  - BNP-N terminal pro    17. Vitamin D deficiency    - Vitamin D Deficiency; Future  - Vitamin D Deficiency    18.  Restless leg disorder  She had been on iron and ropinirole.  Now, off iron-hemochromatosis-.  She is using a self purchased compression device which is helpful.      Loida Stockton MD, MD    Chief Complaint:  Chief Complaint   Patient presents with     RECHECK     f/u from previous visit        History of Present Illness:  Randi is a 66 year old female who is here  today to reestablish care.  Winnie has a very complex past medical history and multiple subspecialist involved in her care.  She was frustrated with our clinic in terms of response time to phone call/etc. and [place] [facility]  After couple of months, she determined that she would be best served by [facility] and has transferred care here.    Her medical history has been complex.  She has severe spinal stenosis and is status post recent cervical laminoplasty.  Her surgery was delayed by multiple issues.  She relapsed with alcohol/did not get a stress test/had bronchitis/had COVID-etc. etc.  She has also been struggling with mental health issues as well.    As listed above, her team members are reviewed.  She is hoping at some point to consolidate simplified care.    Currently, her biggest issue is with regard to postoperative pain and recovery.  She continues to have an occasional headache and tightness in the sternocleidomastoid muscle.  She is working with her neurosurgery team as well as physical therapy.  Dr. Dubois is helping her with pain medication at this juncture.    Mental health has been at issue.  She had a fairly recent i.e. past year and a half-case of serotonin syndrome.  Her psychiatrist I had her on high doses of Pristiq.  After this event that landed her in the ER, she completely went off all medications which caused her mental health issues to escalate.  Finally, she is agreeable to low-dose lithium as prescribed by Dr. Dubois who is her psychiatrist as well as pain management specialist.    Her history is significant for pheochromocytoma, hemochromatosis, obstructive sleep apnea and COPD.    With regard to her restless legs, she has purchased a machine that has been helpful.    Review of Systems:  A comprehensive review of systems was performed and was otherwise negative    PFSH:  Social History: She is .  She has no children.  She is a  former smoker.  She lapsed with her alcohol use disorder but has not had a drink in a couple of months.  History   Smoking Status     Former Smoker     Packs/day: 2.50     Years: 20.00     Types: Cigarettes     Start date: 5/1/1971     Quit date: 6/29/2000   Smokeless Tobacco     Never Used       Past History:   Past Medical History:   Diagnosis Date     Anxiety      Anxiety      Bipolar disorder (H)      Breast cancer (H) 1986    lumpectomy, radiation, chemo     Breast cancer (H) 1994     Chronic pain syndrome      COPD (chronic obstructive pulmonary disease) (H)     asthma     COPD (chronic obstructive pulmonary disease) (H)      Depression      Depression, major, recurrent (H)      Depressive disorder     30+ years     Dizzy      Drug tolerance     opioid     Esophageal reflux      Esophageal reflux      Fatigue      Graves disease 1994     Graves disease      Hemochromatosis 02/14/2018    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 Created by Conversion  Replacement Utility updated for latest IMO load Formatting of this note might be different from the original. Created by Conversion  Replacement Utility updated for latest IMO load     History of corticosteroid therapy 11/19/2019     History of partial adrenalectomy (H) 11/19/2019     History of pheochromocytoma 11/19/2019     Hx antineoplastic chemotherapy      Hx of radiation therapy      Hyperlipidaemia      Hypertension      Hypertension      Impaired fasting glucose 2017     Impaired fasting glucose      Injury of neck, whiplash 7/15/2021     Joint pain      Morbid obesity (H)      KYAW (obstructive sleep apnea) 2016     Osteopenia      Pheochromocytoma      Pheochromocytoma, left 08/02/2017    laparoscopically removed     Postablative hypothyroidism 1995     Prediabetes 10/03/2019    by A1c     Psoriasis      Psoriasis      Psoriatic  arthropathy (H)      Psoriatic arthropathy (H)      Restless leg syndrome      Right rotator cuff tear      RLS (restless legs syndrome)     on ropinorole     Sacroiliitis (H)      Serotonin syndrome 08/28/2020    [facility]     Serotonin syndrome 8/28/2020     Sleep apnea 2017    CPAP     Snoring      Spinal stenosis      Spinal stenosis      Status post coronary angiogram 10/3/2019     Uncomplicated asthma      Urinary incontinence      Vitamin B 12 deficiency 2009     Vitamin B12 deficiency      Vitamin D deficiency 2010       Current Outpatient Medications   Medication Sig Dispense Refill     acetaminophen (TYLENOL) 325 MG tablet Take 2 tablets (650 mg) by mouth every 4 hours as needed for other (For optimal non-opioid multimodal pain management to improve pain control.)       albuterol (PROVENTIL) (2.5 MG/3ML) 0.083% neb solution Take 1 vial (2.5 mg) by nebulization every 4 hours as needed for shortness of breath / dyspnea or wheezing 360 mL 5     albuterol (VENTOLIN HFA) 108 (90 Base) MCG/ACT inhaler Inhale 2 puffs into the lungs every 6 hours 18 g 11     Cholecalciferol (VITAMIN D3) 250 MCG (05782 UT) TABS Take 1 tablet by mouth daily 26665/day       Cyanocobalamin (VITAMIN B-12) 5000 MCG SUBL Place 2-3 sprays under the tongue daily Unknown dose. 2 or 3 sprays/day       ethacrynic acid (EDECRIN) 25 MG tablet Take 1 tablet by mouth on Saturday and Wednesday 30 tablet 11     Fluticasone-Umeclidin-Vilanterol (TRELEGY ELLIPTA) 200-62.5-25 MCG/INH oral inhaler Inhale 1 puff into the lungs daily 1 each 11     HYDROcodone-acetaminophen (NORCO) 5-325 MG tablet Take 1 tablet by mouth 3 times daily as needed for breakthrough pain or moderate to severe pain 42 tablet 0     Lidocaine (LIDOCARE) 4 % Patch Place 2 patches onto the skin every 24 hours To prevent lidocaine toxicity, patient should be patch free for 12 hrs daily. 15 patch 0     LITHIUM PO Take 25 mg by mouth daily OTC lithium oratate       losartan  (COZAAR) 25 MG tablet Take 1 tablet (25 mg) by mouth daily 90 tablet 2     magnesium 250 MG tablet Take 1 tablet by mouth 2 times daily       medical cannabis (Patient's own supply) See Admin Instructions (The purpose of this order is to document that the patient reports taking medical cannabis.  This is not a prescription, and is not used to certify that the patient has a qualifying medical condition.)       methocarbamol (ROBAXIN) 500 MG tablet Take 1 tablet (500 mg) by mouth 4 times daily Wean down on your frequency. 30 tablet 0     Multiple Vitamins-Iron (MULTIVITAMIN PLUS IRON ADULT) TABS Take 4 tablets by mouth daily 120 tablet 0     omeprazole (PRILOSEC) 20 MG DR capsule Take 20 mg by mouth daily        OXcarbazepine (TRILEPTAL) 150 MG tablet One-half tab bedtime 3 nights, one-half tab twice a day 5 days, one twice a day (Patient taking differently: Take 75 mg by mouth daily ) 30 tablet 3     potassium chloride ER (KLOR-CON M) 20 MEQ CR tablet Take 1 tablet (20 mEq) by mouth daily 90 tablet 3     rOPINIRole (REQUIP) 2 MG tablet Take 1-2 tablets (2-4 mg) by mouth At Bedtime 120 tablet 3     SYNTHROID 150 MCG tablet Take 1 tablet (150 mcg) by mouth daily 90 tablet 4     Turmeric Curcumin 500 MG CAPS Take 500 mg by mouth daily        vitamin (B COMPLEX-C) tablet Take 1 tablet by mouth daily       vitamin E 400 units TABS Take 800 Units by mouth daily         Family History:     Physical Exam:  General Appearance:   She appears at baseline and in no acute distress.  Her weight is up about 13 pounds  Vitals:    02/23/22 1216   BP: 124/72   BP Location: Right arm   Patient Position: Sitting   Cuff Size: Adult Large   Pulse: 88   SpO2: 96%   Weight: 103.9 kg (229 lb 1.6 oz)     Wt Readings from Last 3 Encounters:   02/23/22 103.9 kg (229 lb 1.6 oz)   02/03/22 98 kg (216 lb)   12/21/21 98 kg (216 lb 1.6 oz)     Body mass index is 36.98 kg/m .    She is obese and ambulates very slowly  Lungs are with coarse breath  sounds but no wheezing  Cardiac exam reveals a regular rate and rhythm  Extremities are negative for edema.    Data Review:    Analysis and Summary of Old Records (2): Detailed analysis of all records was had    Records Requested (1):       Other History Summarized (from other people in the room) (2):     Radiology Tests Summarized (XRAY/CT/MRI/DXA) (1):     Labs Reviewed (1): Ordered    Medicine Tests Reviewed (EKG/ECHO/COLONOSCOPY/EGD) (1):    Independent Review of EKG or X-RAY (2):     Time spent today in excess of 50 minutes reviewing chart, discussing patient's postoperative course and all of her individual medical diagnoses

## 2022-02-24 ENCOUNTER — VIRTUAL VISIT (OUTPATIENT)
Dept: PSYCHOLOGY | Facility: CLINIC | Age: 67
End: 2022-02-24
Payer: MEDICARE

## 2022-02-24 DIAGNOSIS — F31.81 BIPOLAR 2 DISORDER (H): Primary | ICD-10-CM

## 2022-02-24 DIAGNOSIS — F41.1 GENERALIZED ANXIETY DISORDER: ICD-10-CM

## 2022-02-24 DIAGNOSIS — F43.9 TRAUMA AND STRESSOR-RELATED DISORDER: ICD-10-CM

## 2022-02-24 LAB — DEPRECATED CALCIDIOL+CALCIFEROL SERPL-MC: 43 UG/L (ref 30–80)

## 2022-02-24 PROCEDURE — 90834 PSYTX W PT 45 MINUTES: CPT | Mod: 95 | Performed by: SOCIAL WORKER

## 2022-02-24 NOTE — PROGRESS NOTES
Progress Note    Patient Name: Randi Cleary  Date: 2/24/22         Service Type: Individual      Session Start Time: 2:01 PM  Session End Time: 2:48 PM     Session Length: 47 minutes     Session #: 89    Attendees: Client attended alone    Service Modality:  Video Visit:    Telemedicine Visit: The patient's condition can be safely assessed and treated via synchronous audio and visual telemedicine encounter.      Reason for Telemedicine Visit: Services only offered telehealth    Originating Site (Patient Location): Patient's home    Distant Site (Provider Location): Provider Remote Setting- Home Office    Consent:  The patient/guardian has verbally consented to: the potential risks and benefits of telemedicine (video visit) versus in person care; bill my insurance or make self-payment for services provided; and responsibility for payment of non-covered services.     Mode of Communication:  Video Conference via [employer]    As the provider I attest to compliance with applicable laws and regulations related to telemedicine.      Treatment Plan Last Reviewed: 12/10/21  PHQ-9 / CHAVO-7 :   PHQ 2/15/2022 2/22/2022 2/22/2022   PHQ-9 Total Score 15 14 17   Q9: Thoughts of better off dead/self-harm past 2 weeks Not at all Several days Not at all   F/U: Thoughts of suicide or self-harm - No -   F/U: Self harm-plan - - -   F/U: Self-harm action - - -   F/U: Safety concerns - No -   Some encounter information is confidential and restricted. Go to Review Flowsheets activity to see all data.     CHAVO-7 SCORE 2/3/2022 2/3/2022 2/3/2022   Total Score - - 15 (severe anxiety)   Total Score 15 15 15   Some encounter information is confidential and restricted. Go to Review Flowsheets activity to see all data.         DATA  Extended Session (53+ minutes): No  Interactive Complexity: No  Crisis: No      Progress Since Last Session (Related to Symptoms / Goals / Homework):   Symptoms: Improving  overall, more hope     Homework: Achieved / completed to satisfaction  Consider journaling after sessions -done  Consider looking into You Need A Budget -attempted  Consider talking to Sidney about senior living -done     Episode of Care Goals: Minimal progress - ACTION (Actively working towards change); Intervened by reinforcing change plan / affirming steps taken     Current / Ongoing Stressors and Concerns:   Patient is currently socially isolated. She has a conflictual relationship with her .  She is getting minimal physical activity.  She had recent eye surgery     Treatment Objective(s) Addressed in This Session:   Patient will increase frequency of engaging her in ADLs.  Patient will track and record at least 5 pleasant exchanges with . Patient will be able to identify at least 5 positive traits about her .  Patient will reduce level of depressive and anxious features as evidenced by reduction in score on her CHAVO-7 and PHQ-9 (scores of 15 and 16 at first measurment, respectively).     Intervention:   Supportive Therapy: Reports physical health is finally going better. Discussed financial health. Talked about how finances impact her mental health.  Reviewed homework and identified successes and barriers to completion.  Identified next steps to gaining some freedom.    ASSESSMENT: Current Emotional / Mental Status (status of significant symptoms):   Risk status (Self / Other harm or suicidal ideation)   Patient denies current fears or concerns for personal safety.   Patient denies current or recent suicidal ideation or behaviors.   Patient denies current or recent homicidal ideation or behaviors.   Patient denies current or recent self injurious behavior or ideation.   Patient denies other safety concerns.   Patient reports there has been no change in risk factors since their last session.     Patient reports there has been no change in protective factors since their last session.     A safety  and risk management plan has been developed including: Patient consented to co-developed safety plan.  Safety and risk management plan was completed.  Patient agreed to use safety plan should any safety concerns arise.  A copy was given to the patient at a past session, on 11/24/2020, and a copy is attached to the bottom of this note.      Appearance:   Appropriate    Eye Contact:   Good    Psychomotor Behavior: Normal    Attitude:   Cooperative    Orientation:   All   Speech    Rate / Production: Normal/ Responsive    Volume:  Normal    Mood:    Normal   Affect:    Appropriate    Thought Content:  Clear    Thought Form:  Coherent    Insight:    Fair      Medication Review:   No changes to current psychiatric medication(s)      Medication Compliance:   Yes     Changes in Health Issues:   None reported     Chemical Use Review:   Substance Use: Chemical use reviewed, no active concerns identified . Nothing used since August 2021.     Tobacco Use: No current tobacco use.      Diagnosis:  1. Bipolar 2 disorder (H)    2. Generalized anxiety disorder    3. Trauma and stressor-related disorder        Collateral Reports Completed:   Not Applicable    PLAN: (Patient Tasks / Therapist Tasks / Other)  Consider journaling after sessions  Consider looking into You Need A Budget      Next session- work with this part of yourself that spends the money- not wanting to miss out.    In the future, return to: the part that puts up walls and the part that is sensitive to rejection          MICAELA SLADE    February 24, 2022                                                         ______________________________________________________________________    Treatment Plan    Patient's Name: Randi Cleary  YOB: 1955    Date: 12/10/21    DSM5 Diagnoses: 296.32 (F33.1) Major Depressive Disorder, Recurrent Episode, Moderate With anxious distress  Psychosocial / Contextual Factors: Patient's entire family of origin has  ", she now has a sister-in-law and  as support.  Relationship with  is conflictual.  Patient is reporting increase in physical symptoms due to COVID-19.  Patient is off of pain medications.  WHODAS: 42    Referral / Collaboration:  Referral to another professional/service is not indicated at this time.     Anticipated number of session or this episode of care: 12-15      MeasurableTreatment Goal(s) related to diagnosis / functional impairment(s)  Goal 1: Patient will \"jumpstart, getting going with the things I need to be doing around the house as far as picking up, doing things, trying to do something every day.  Also to lessen the animosity between me and my .\"    I will know I've met my goal when my shoulders are fixed and I can see.      Objective #A (Patient Action)    Patient will increase frequency of engaging her in ADLs.  Status: Continued - Date(s): 12/10/21    Intervention(s)  Therapist will engage patient in CBT, specifically behavioral activation.    Objective #B  Patient will track and record at least 5 pleasant exchanges with . Patient will be able to identify at least 5 positive traits about her  and how he relates to her.  Status: Continued - Date(s): 12/10/21    Intervention(s)  Therapist will teach assertiveness skills and assign homework related to relationship interactions.    Objective #C  Patient will reduce level of depressive and anxious features as evidenced by reduction in score on her CHAVO-7 and PHQ-9 (scores of 15 and 16 at first measurment, respectively).  Status: Continued - Date(s):  12/10/21    Intervention(s)  Therapist will engage patient in person-centered therapy and CBT.    Patient has reviewed and agreed to the above plan.      MICAELA SLADE  December 10, 2021                                                Randi Cleary          SAFETY PLAN:  Step 1: Warning signs / cues (Thoughts, images, mood, situation, behavior) that a crisis may " "be developing:  ? Thoughts: \"I don't want to continue\" \"I am unwanted\"  ? Images: none  ? Thinking Processes: ruminating  ? Mood: anger  ? Behaviors: isolating/withdrawing , can't stop crying, not taking care of myself and not taking care of my responsibilities  ? Situations: small triggers, such as not being able to find something, or dropping something   Step 2: Coping strategies - Things I can do to take my mind off of my problems without contacting another person (relaxation technique, physical activity):  ? Distress Tolerance Strategies:  arts and crafts: drawing, play with my pet , listen to positive and upbeat music: any, change body temperature (ice pack/cold water)  and paced breathing/progressive muscle relaxation  ? Physical Activities: go for a walk, deep breathing and stretching   ? Focus on helpful thoughts:  \"You've been through this before, you can get through it again.\"  Step 3: People and social settings that provide distraction:                 Name: Carmen                            Name: Darien                           Name: Aleida       ? pool, shopping, Carmen's house, [employer]       Step 4: Remind myself of people and things that are important to me and worth living for:  Clifford Little Donna, post-COVID world, options of what could be in your future        Step 5: When I am in crisis, I can ask these people to help me use my safety plan:                 Name: Sidney  Step 6: Making the environment safe:   ? go to sleep/daydream  Step 7: Professionals or agencies I can contact during a crisis:  ? [facility] Daytime Number: [phone]  ? Suicide Prevention Lifeline: 0-518-426-QYRD (7552)  ? Crisis Text Line Service (available 24 hours a day, 7 days a week): Text [place] to 338232    Local Crisis Services: [facility] Crisis: [phone]     Call 911 or go to my nearest emergency department.       I helped develop this safety plan and agree to use it when needed.  I have been " given a copy of this plan.       Client signature _________________________________________________________________  Today s date:  11/24/2020  Adapted from Safety Plan Template 2008 Randi Poole and Robby Barba is reprinted with the express permission of the authors.  No portion of the Safety Plan Template may be reproduced without the express, written permission.  You can contact the authors at [email or web address] or [email or web address].

## 2022-03-01 NOTE — PROGRESS NOTES
[facility]                                     Progress Note    Patient Name: Randi Cleary  Date: 3/2/22         Service Type: Individual      Session Start Time: 9:04 AM  Session End Time: 9:52 PM     Session Length: 48 minutes    Session #: 90    Attendees: Client attended alone    Service Modality:  Video Visit:      Provider verified identity through the following two step process.  Patient provided:  Patient is known previously to provider    Telemedicine Visit: The patient's condition can be safely assessed and treated via synchronous audio and visual telemedicine encounter.      Reason for Telemedicine Visit: Services only offered telehealth    Originating Site (Patient Location): Patient's home    Distant Site (Provider Location): Provider Remote Setting- Home Office    Consent:  The patient/guardian has verbally consented to: the potential risks and benefits of telemedicine (video visit) versus in person care; bill my insurance or make self-payment for services provided; and responsibility for payment of non-covered services.     Patient would like the video invitation sent by:  My Chart    Mode of Communication:  Video Conference via [employer]well    As the provider I attest to compliance with applicable laws and regulations related to telemedicine.    DATA  Extended Session (53+ minutes): No  Interactive Complexity: No  Crisis: No        Progress Since Last Session (Related to Symptoms / Goals / Homework):   Symptoms: No significant change    Homework: Achieved / completed to satisfaction  Consider journaling after sessions done  Consider looking into You Need A Budget -done        Episode of Care Goals: Satisfactory progress - ACTION (Actively working towards change); Intervened by reinforcing change plan / affirming steps taken     Current / Ongoing Stressors and Concerns:   Patient is currently socially isolated. She has a conflictual relationship with her .  She is getting  minimal physical activity.  She had recent eye surgery and shoulder surgery.     Treatment Objective(s) Addressed in This Session:   Patient will increase frequency of engaging her in ADLs.  Patient will track and record at least 5 pleasant exchanges with . Patient will be able to identify at least 5 positive traits about her .  Patient will reduce level of depressive and anxious features as evidenced by reduction in score on her CHAVO-7 and PHQ-9 (scores of 15 and 16 at first measurment, respectively).     Intervention:   Person-Centered Therapy, Solution Focused Therapy, Internal Family Systems Therapy: Discussed patient's spending patterns and how they impact her mood and her relationship. Looked at steps she has taken to start to be more cognizant of her spending and make changes. Looked briefly at how a part of herself found regulation through spending. Reviewed homework and identified successes and barriers to completion.    Assessments completed prior to visit:  PHQ9:   PHQ-9 SCORE 2/3/2022 2/8/2022 2/10/2022 2/15/2022 2/22/2022 2/22/2022 3/2/2022   PHQ-9 Total Score MyChart 14 (Moderate depression) 15 (Moderately severe depression) 18 (Moderately severe depression) 15 (Moderately severe depression) 17 (Moderately severe depression) 14 (Moderate depression) 13 (Moderate depression)   PHQ-9 Total Score 14 15 18 15 14 17 13   Some encounter information is confidential and restricted. Go to Review Flowsheets activity to see all data.     GAD2:   CHAVO-2 2/3/2022 2/3/2022 2/15/2022 2/15/2022 2/15/2022 3/2/2022 3/2/2022   Feeling nervous, anxious, or on edge 2 2 3 3 3 3 3   Not being able to stop or control worrying 2 2 3 3 3 3 3   CHAVO-2 Total Score 4 4 6 6 6 6 6         ASSESSMENT: Current Emotional / Mental Status (status of significant symptoms):   Risk status (Self / Other harm or suicidal ideation)   Patient denies current fears or concerns for personal safety.   Patient denies current or recent  suicidal ideation or behaviors.   Patient denies current or recent homicidal ideation or behaviors.   Patient denies current or recent self injurious behavior or ideation.   Patient denies other safety concerns.   Patient reports there has been no change in risk factors since their last session.     Patient reports there has been no change in protective factors since their last session.     A safety and risk management plan has been developed including: Patient has no change in safety concerns. Committed to safety and agreed to follow previously developed safety plan..  Patient consented to co-developed safety plan.  Safety and risk management plan was completed.  Patient agreed to use safety plan should any safety concerns arise.  A copy was given to the patient.     Appearance:   Appropriate    Eye Contact:   Good    Psychomotor Behavior: Normal    Attitude:   Cooperative    Orientation:   All   Speech    Rate / Production: Normal/ Responsive    Volume:  Normal    Mood:    Normal   Affect:    Appropriate    Thought Content:  Clear    Thought Form:  Coherent    Insight:    Good      Medication Review:   No changes to current psychiatric medication(s)     Medication Compliance:   Yes     Changes in Health Issues:   None reported     Chemical Use Review:   Substance Use: Chemical use reviewed, no active concerns identified Nothing used since August 2021.     Tobacco Use: No current tobacco use.      Diagnosis:  1. Bipolar 2 disorder (H)    2. Generalized anxiety disorder    3. Trauma and stressor-related disorder        Collateral Reports Completed:   Not Applicable    PLAN: (Patient Tasks / Therapist Tasks / Other)  Figure out when magazines are renewing and cancel  Continue to journaling before sessions  Continue to budget    Next session- work with this part of yourself that spends the money- not wanting to miss out.     In the future, return to: the part that puts up walls and the part that is sensitive to  "rejection      MICAELA SLADE   2022                                                         ______________________________________________________________________    Individual Treatment Plan    Patient's Name: Randi Cleary  YOB: 1955    Date of Creation: 20  Date Treatment Plan Last Reviewed/Revised: 12/10/22    DSM5 Diagnoses: 296.89 Bipolar II Disorder Depressed, 300.02 (F41.1) Generalized Anxiety Disorder or Adjustment Disorders  309.89 (F43.8) Other Specified Trauma and Stressor Related Disorder  Psychosocial / Contextual Factors: Patient's entire family of origin has , she now has a sister-in-law and  as support.  Relationship with  is conflictual. She is recovering from surgeries  PROMIS (reviewed every 90 days): PROMIS-10 Scores               Referral / Collaboration:  Referral to another professional/service is not indicated at this time..    Anticipated number of session for this episode of care: 50  Anticipation frequency of session: Biweekly  Anticipated Duration of each session: 53 or more minutes due to intensity of trauma symptoms  Treatment plan will be reviewed in 90 days or when goals have been changed.       MeasurableTreatment Goal(s) related to diagnosis / functional impairment(s)  Goal 1: Patient will \"jumpstart, getting going with the things I need to be doing around the house as far as picking up, doing things, trying to do something every day.  Also to lessen the animosity between me and my .\"    I will know I've met my goal when my shoulders are fixed and I can see.      Objective #A (Patient Action)    Patient will increase frequency of engaging her in ADLs.  Status: Continued - Date(s): 12/10/21    Intervention(s)  Therapist will engage patient in CBT, specifically behavioral activation.    Objective #B  Patient will  track and record at least 5 pleasant exchanges with . Patient will be able to identify at least 5 " "positive traits about her  and how he relates to her.  Status: Continued - Date(s): 12/10/21    Intervention(s)  Therapist will teach assertiveness skills and assign homework related to relationship interactions.    Objective #C  Patient will reduce level of depressive and anxious features as evidenced by reduction in score on her CHAVO-7 and PHQ-9 (scores of 15 and 16 at first measurment, respectively).  Status: Continued - Date(s): 12/10/21    Intervention(s)  Therapist will engage patient in person-centered therapy and CBT.    Patient has reviewed and agreed to the above plan.      MICAELA SLADE  March 1, 2022                                                   Randi Cleary          SAFETY PLAN:  Step 1: Warning signs / cues (Thoughts, images, mood, situation, behavior) that a crisis may be developing:  ? Thoughts: \"I don't want to continue\" \"I am unwanted\"  ? Images: none  ? Thinking Processes: ruminating  ? Mood: anger  ? Behaviors: isolating/withdrawing , can't stop crying, not taking care of myself and not taking care of my responsibilities  ? Situations: small triggers, such as not being able to find something, or dropping something   Step 2: Coping strategies - Things I can do to take my mind off of my problems without contacting another person (relaxation technique, physical activity):  ? Distress Tolerance Strategies:  arts and crafts: drawing, play with my pet , listen to positive and upbeat music: any, change body temperature (ice pack/cold water)  and paced breathing/progressive muscle relaxation  ? Physical Activities: go for a walk, deep breathing and stretching   ? Focus on helpful thoughts:  \"You've been through this before, you can get through it again.\"  Step 3: People and social settings that provide distraction:                 Name: Carmen                            Name: Darien                           Name: Aleida       ? pool, shopping, Carmen's house, [employer]       Step 4: Remind " myself of people and things that are important to me and worth living for:  Clifford Little Donna, post-COVID world, options of what could be in your future        Step 5: When I am in crisis, I can ask these people to help me use my safety plan:                 Name: Sidney  Step 6: Making the environment safe:   ? go to sleep/daydream  Step 7: Professionals or agencies I can contact during a crisis:  ? [facility] Daytime Number: [phone]  ? Suicide Prevention Lifeline: 1-621-968-OOCZ (1364)  ? Crisis Text Line Service (available 24 hours a day, 7 days a week): Text [place] to 365284    Local Crisis Services: [facility] Crisis: [phone]     Call 911 or go to my nearest emergency department.       I helped develop this safety plan and agree to use it when needed.  I have been given a copy of this plan.       Client signature _________________________________________________________________  Today s date:  11/24/2020  Adapted from Safety Plan Template 2008 Randi Poole and Robby Barba is reprinted with the express permission of the authors.  No portion of the Safety Plan Template may be reproduced without the express, written permission.  You can contact the authors at [email or web address] or [email or web address].

## 2022-03-02 ENCOUNTER — VIRTUAL VISIT (OUTPATIENT)
Dept: PSYCHOLOGY | Facility: CLINIC | Age: 67
End: 2022-03-02
Payer: MEDICARE

## 2022-03-02 DIAGNOSIS — F41.1 GENERALIZED ANXIETY DISORDER: ICD-10-CM

## 2022-03-02 DIAGNOSIS — F31.81 BIPOLAR 2 DISORDER (H): Primary | ICD-10-CM

## 2022-03-02 DIAGNOSIS — F43.9 TRAUMA AND STRESSOR-RELATED DISORDER: ICD-10-CM

## 2022-03-02 PROCEDURE — 90834 PSYTX W PT 45 MINUTES: CPT | Mod: 95 | Performed by: SOCIAL WORKER

## 2022-03-02 ASSESSMENT — ANXIETY QUESTIONNAIRES
3. WORRYING TOO MUCH ABOUT DIFFERENT THINGS: MORE THAN HALF THE DAYS
5. BEING SO RESTLESS THAT IT IS HARD TO SIT STILL: MORE THAN HALF THE DAYS
GAD7 TOTAL SCORE: 18
6. BECOMING EASILY ANNOYED OR IRRITABLE: NEARLY EVERY DAY
GAD7 TOTAL SCORE: 18
1. FEELING NERVOUS, ANXIOUS, OR ON EDGE: NEARLY EVERY DAY
GAD7 TOTAL SCORE: 18
4. TROUBLE RELAXING: NEARLY EVERY DAY
7. FEELING AFRAID AS IF SOMETHING AWFUL MIGHT HAPPEN: MORE THAN HALF THE DAYS
7. FEELING AFRAID AS IF SOMETHING AWFUL MIGHT HAPPEN: MORE THAN HALF THE DAYS
2. NOT BEING ABLE TO STOP OR CONTROL WORRYING: NEARLY EVERY DAY

## 2022-03-02 ASSESSMENT — PATIENT HEALTH QUESTIONNAIRE - PHQ9
10. IF YOU CHECKED OFF ANY PROBLEMS, HOW DIFFICULT HAVE THESE PROBLEMS MADE IT FOR YOU TO DO YOUR WORK, TAKE CARE OF THINGS AT HOME, OR GET ALONG WITH OTHER PEOPLE: SOMEWHAT DIFFICULT
SUM OF ALL RESPONSES TO PHQ QUESTIONS 1-9: 13
SUM OF ALL RESPONSES TO PHQ QUESTIONS 1-9: 13

## 2022-03-03 ASSESSMENT — PATIENT HEALTH QUESTIONNAIRE - PHQ9: SUM OF ALL RESPONSES TO PHQ QUESTIONS 1-9: 13

## 2022-03-03 ASSESSMENT — ANXIETY QUESTIONNAIRES: GAD7 TOTAL SCORE: 18

## 2022-03-04 ENCOUNTER — MEDICAL CORRESPONDENCE (OUTPATIENT)
Dept: NEUROSURGERY | Facility: CLINIC | Age: 67
End: 2022-03-04
Payer: MEDICARE

## 2022-03-04 ENCOUNTER — VIRTUAL VISIT (OUTPATIENT)
Dept: PSYCHOLOGY | Facility: CLINIC | Age: 67
End: 2022-03-04
Payer: MEDICARE

## 2022-03-04 DIAGNOSIS — M47.12 CERVICAL SPONDYLOSIS WITH MYELOPATHY: ICD-10-CM

## 2022-03-04 DIAGNOSIS — G95.20 CORD COMPRESSION (H): Chronic | ICD-10-CM

## 2022-03-04 DIAGNOSIS — M54.12 CERVICAL RADICULOPATHY: ICD-10-CM

## 2022-03-04 DIAGNOSIS — F31.81 BIPOLAR 2 DISORDER (H): Primary | ICD-10-CM

## 2022-03-04 DIAGNOSIS — F43.9 TRAUMA AND STRESSOR-RELATED DISORDER: ICD-10-CM

## 2022-03-04 DIAGNOSIS — F41.1 GENERALIZED ANXIETY DISORDER: ICD-10-CM

## 2022-03-04 PROCEDURE — 90834 PSYTX W PT 45 MINUTES: CPT | Mod: 95 | Performed by: SOCIAL WORKER

## 2022-03-04 RX ORDER — METHOCARBAMOL 500 MG/1
500 TABLET, FILM COATED ORAL 4 TIMES DAILY
Qty: 30 TABLET | Refills: 0 | Status: SHIPPED | OUTPATIENT
Start: 2022-03-04 | End: 2022-03-17

## 2022-03-04 NOTE — PROGRESS NOTES
[facility]                                     Progress Note    Patient Name: Randi Cleary  Date: 3/4/22         Service Type: Individual      Session Start Time: 8:00 AM  Session End Time: 8:46 AM     Session Length: 46 minutes    Session #: 91    Attendees: Client attended alone    Service Modality:  Video Visit:      Provider verified identity through the following two step process.  Patient provided:  Patient is known previously to provider    Telemedicine Visit: The patient's condition can be safely assessed and treated via synchronous audio and visual telemedicine encounter.      Reason for Telemedicine Visit: Services only offered telehealth    Originating Site (Patient Location): Patient's home    Distant Site (Provider Location): Provider Remote Setting- Home Office    Consent:  The patient/guardian has verbally consented to: the potential risks and benefits of telemedicine (video visit) versus in person care; bill my insurance or make self-payment for services provided; and responsibility for payment of non-covered services.     Patient would like the video invitation sent by:  My Chart    Mode of Communication:  Video Conference via Amwell    As the provider I attest to compliance with applicable laws and regulations related to telemedicine.    DATA  Extended Session (53+ minutes): No  Interactive Complexity: No  Crisis: No        Progress Since Last Session (Related to Symptoms / Goals / Homework):   Symptoms: No significant change    Homework: Achieved / completed to satisfaction  Figure out when magazines are renewing and cancel -not done, so far just looked them up  Continue to journaling before sessions -done  Continue to budget -done    Next session- work with this part of yourself that spends the money- not wanting to miss out.     In the future, return to: the part that puts up walls and the part that is sensitive to rejection     Episode of Care Goals: Satisfactory  progress - ACTION (Actively working towards change); Intervened by reinforcing change plan / affirming steps taken     Current / Ongoing Stressors and Concerns:   Patient is currently socially isolated. She has a conflictual relationship with her .  She is getting minimal physical activity.  She had recent eye surgery and shoulder surgery.     Treatment Objective(s) Addressed in This Session:   Patient will increase frequency of engaging her in ADLs.  Patient will track and record at least 5 pleasant exchanges with . Patient will be able to identify at least 5 positive traits about her .  Patient will reduce level of depressive and anxious features as evidenced by reduction in score on her CHAVO-7 and PHQ-9 (scores of 15 and 16 at first measurment, respectively).     Intervention:   Person-Centered Therapy, Solution Focused Therapy, Internal Family Systems Therapy: Discussed patient's challenges with her insurance and finances lately. Discussed patient's patterns with moods and how journaling has been helpful. She's been noticing her patterns on spending and related to her diagnoses. Talked about how budgeting worked with keeping track of things. Discussed relationship with her  and a sore subject about work done on their house. Talked about resolving some of the emotions around this with her , not just fixing the problem.     Assessments completed prior to visit:  PHQ9:   PHQ-9 SCORE 2/3/2022 2/8/2022 2/10/2022 2/15/2022 2/22/2022 2/22/2022 3/2/2022   PHQ-9 Total Score MyChart 14 (Moderate depression) 15 (Moderately severe depression) 18 (Moderately severe depression) 15 (Moderately severe depression) 17 (Moderately severe depression) 14 (Moderate depression) 13 (Moderate depression)   PHQ-9 Total Score 14 15 18 15 14 17 13   Some encounter information is confidential and restricted. Go to Review Flowsheets activity to see all data.     GAD2:   CHAVO-2 2/3/2022 2/3/2022 2/15/2022  2/15/2022 2/15/2022 3/2/2022 3/2/2022   Feeling nervous, anxious, or on edge 2 2 3 3 3 3 3   Not being able to stop or control worrying 2 2 3 3 3 3 3   CHAVO-2 Total Score 4 4 6 6 6 6 6         ASSESSMENT: Current Emotional / Mental Status (status of significant symptoms):   Risk status (Self / Other harm or suicidal ideation)   Patient denies current fears or concerns for personal safety.   Patient denies current or recent suicidal ideation or behaviors.   Patient denies current or recent homicidal ideation or behaviors.   Patient denies current or recent self injurious behavior or ideation.   Patient denies other safety concerns.   Patient reports there has been no change in risk factors since their last session.     Patient reports there has been no change in protective factors since their last session.     A safety and risk management plan has been developed including: Patient has no change in safety concerns. Committed to safety and agreed to follow previously developed safety plan..  Patient consented to co-developed safety plan.  Safety and risk management plan was completed.  Patient agreed to use safety plan should any safety concerns arise.  A copy was given to the patient.     Appearance:   Appropriate    Eye Contact:   Good    Psychomotor Behavior: Normal    Attitude:   Cooperative    Orientation:   All   Speech    Rate / Production: Normal/ Responsive    Volume:  Normal    Mood:    Normal   Affect:    Appropriate    Thought Content:  Clear    Thought Form:  Coherent    Insight:    Good      Medication Review:   No changes to current psychiatric medication(s)     Medication Compliance:   Yes     Changes in Health Issues:   None reported     Chemical Use Review:   Substance Use: Chemical use reviewed, no active concerns identified Nothing used since August 2021.     Tobacco Use: No current tobacco use.      Diagnosis:  1. Bipolar 2 disorder (H)    2. Generalized anxiety disorder    3. Trauma and  "stressor-related disorder        Collateral Reports Completed:   Not Applicable    PLAN: (Patient Tasks / Therapist Tasks / Other)  Look in May for couples therapist  Continue to journal before sessions  Continue to budget  Figure out when magazines are renewing and cancel      Next session- work with this part of yourself that spends the money- not wanting to miss out.     In the future, return to: the part that puts up walls and the part that is sensitive to rejection      MICAELA SLADE   2022                                                         ______________________________________________________________________    Individual Treatment Plan    Patient's Name: Randi Cleary  YOB: 1955    Date of Creation: 20  Date Treatment Plan Last Reviewed/Revised: 12/10/22    DSM5 Diagnoses: 296.89 Bipolar II Disorder Depressed, 300.02 (F41.1) Generalized Anxiety Disorder or Adjustment Disorders  309.89 (F43.8) Other Specified Trauma and Stressor Related Disorder  Psychosocial / Contextual Factors: Patient's entire family of origin has , she now has a sister-in-law and  as support.  Relationship with  is conflictual. She is recovering from surgeries  PROMIS (reviewed every 90 days): PROMIS-10 Scores               Referral / Collaboration:  Referral to another professional/service is not indicated at this time..    Anticipated number of session for this episode of care: 50  Anticipation frequency of session: Biweekly  Anticipated Duration of each session: 53 or more minutes due to intensity of trauma symptoms  Treatment plan will be reviewed in 90 days or when goals have been changed.       MeasurableTreatment Goal(s) related to diagnosis / functional impairment(s)  Goal 1: Patient will \"jumpstart, getting going with the things I need to be doing around the house as far as picking up, doing things, trying to do something every day.  Also to lessen the animosity " "between me and my .\"    I will know I've met my goal when my shoulders are fixed and I can see.      Objective #A (Patient Action)    Patient will increase frequency of engaging her in ADLs.  Status: Continued - Date(s): 12/10/21    Intervention(s)  Therapist will engage patient in CBT, specifically behavioral activation.    Objective #B  Patient will  track and record at least 5 pleasant exchanges with . Patient will be able to identify at least 5 positive traits about her  and how he relates to her.  Status: Continued - Date(s): 12/10/21    Intervention(s)  Therapist will teach assertiveness skills and assign homework related to relationship interactions.    Objective #C  Patient will reduce level of depressive and anxious features as evidenced by reduction in score on her CHAVO-7 and PHQ-9 (scores of 15 and 16 at first measurment, respectively).  Status: Continued - Date(s): 12/10/21    Intervention(s)  Therapist will engage patient in person-centered therapy and CBT.    Patient has reviewed and agreed to the above plan.      MICAELA SLADE  March 1, 2022                                                   Randi Cleary          SAFETY PLAN:  Step 1: Warning signs / cues (Thoughts, images, mood, situation, behavior) that a crisis may be developing:  ? Thoughts: \"I don't want to continue\" \"I am unwanted\"  ? Images: none  ? Thinking Processes: ruminating  ? Mood: anger  ? Behaviors: isolating/withdrawing , can't stop crying, not taking care of myself and not taking care of my responsibilities  ? Situations: small triggers, such as not being able to find something, or dropping something   Step 2: Coping strategies - Things I can do to take my mind off of my problems without contacting another person (relaxation technique, physical activity):  ? Distress Tolerance Strategies:  arts and crafts: drawing, play with my pet , listen to positive and upbeat music: any, change body temperature (ice " "pack/cold water)  and paced breathing/progressive muscle relaxation  ? Physical Activities: go for a walk, deep breathing and stretching   ? Focus on helpful thoughts:  \"You've been through this before, you can get through it again.\"  Step 3: People and social settings that provide distraction:                 Name: Carmen                            Name: Darien                           Name: Aleida       ? pool, shopping, Carmen's house, [employer]       Step 4: Remind myself of people and things that are important to me and worth living for:  Clifford Little Donna, post-COVID world, options of what could be in your future        Step 5: When I am in crisis, I can ask these people to help me use my safety plan:                 Name: Sidney  Step 6: Making the environment safe:   ? go to sleep/daydream  Step 7: Professionals or agencies I can contact during a crisis:  ? [facility] Number: [phone]  ? Suicide Prevention Lifeline: 3-683-145-BVKE (7244)  ? Crisis Text Line Service (available 24 hours a day, 7 days a week): Text [place] to 383146    Local Crisis Services: [facility] Crisis: [phone]     Call 911 or go to my nearest emergency department.       I helped develop this safety plan and agree to use it when needed.  I have been given a copy of this plan.       Client signature _________________________________________________________________  Today s date:  11/24/2020  Adapted from Safety Plan Template 2008 Randi Poole and Robby Barba is reprinted with the express permission of the authors.  No portion of the Safety Plan Template may be reproduced without the express, written permission.  You can contact the authors at [email or web address] or [email or web address].  "

## 2022-03-04 NOTE — TELEPHONE ENCOUNTER
Received fax request from [id number] - [place], [place] - [address] pharmacy requesting refill(s) for methocarbamol (ROBAXIN) 500 MG tablet    Last refilled on 02/21/2022    Pt last seen on 02/17/2022  Next appt scheduled for 04/06/2022    Will facilitate refill.    Reina Botello MA  [facility] [place]

## 2022-03-09 ENCOUNTER — VIRTUAL VISIT (OUTPATIENT)
Dept: PSYCHOLOGY | Facility: CLINIC | Age: 67
End: 2022-03-09
Payer: MEDICARE

## 2022-03-09 DIAGNOSIS — F43.9 TRAUMA AND STRESSOR-RELATED DISORDER: ICD-10-CM

## 2022-03-09 DIAGNOSIS — F41.1 GENERALIZED ANXIETY DISORDER: ICD-10-CM

## 2022-03-09 DIAGNOSIS — F31.81 BIPOLAR 2 DISORDER (H): Primary | ICD-10-CM

## 2022-03-09 PROCEDURE — 90837 PSYTX W PT 60 MINUTES: CPT | Mod: 95 | Performed by: SOCIAL WORKER

## 2022-03-09 NOTE — PROGRESS NOTES
[facility]                                     Progress Note    Patient Name: Randi Cleary  Date: 3/9/22         Service Type: Individual      Session Start Time: 2:00 PM  Session End Time: 2:54 PM     Session Length: 54 minutes    Session #: 92    Attendees: Client attended alone    Service Modality:  Video Visit:      Provider verified identity through the following two step process.  Patient provided:  Patient is known previously to provider    Telemedicine Visit: The patient's condition can be safely assessed and treated via synchronous audio and visual telemedicine encounter.      Reason for Telemedicine Visit: Services only offered telehealth    Originating Site (Patient Location): Patient's home    Distant Site (Provider Location): Provider Remote Setting- Home Office    Consent:  The patient/guardian has verbally consented to: the potential risks and benefits of telemedicine (video visit) versus in person care; bill my insurance or make self-payment for services provided; and responsibility for payment of non-covered services.     Patient would like the video invitation sent by:  My Chart    Mode of Communication:  Video Conference via Amwell    As the provider I attest to compliance with applicable laws and regulations related to telemedicine.    DATA  Extended Session (53+ minutes): PROLONGED SERVICE IN THE OUTPATIENT SETTING REQUIRING DIRECT (FACE-TO-FACE) PATIENT CONTACT BEYOND THE USUAL SERVICE:    - Treatment protocol required additional time to complete, due to the nature of diagnosis being treated.  See Interventions section for details  Interactive Complexity: No  Crisis: No        Progress Since Last Session (Related to Symptoms / Goals / Homework):   Symptoms: Worsening due to distress about finances related to medications    Homework: Achieved / completed to satisfaction  Look in May for couples therapist -not yet due  Continue to journal before sessions  -done  Continue to budget -not done  Figure out when magazines are renewing and cancel -not done       Episode of Care Goals: Satisfactory progress - ACTION (Actively working towards change); Intervened by reinforcing change plan / affirming steps taken     Current / Ongoing Stressors and Concerns:   Patient is currently socially isolated. She has a conflictual relationship with her .  She is getting minimal physical activity.  She had recent eye surgery and shoulder surgery.     Treatment Objective(s) Addressed in This Session:   Patient will increase frequency of engaging her in ADLs.  Patient will track and record at least 5 pleasant exchanges with . Patient will be able to identify at least 5 positive traits about her .  Patient will reduce level of depressive and anxious features as evidenced by reduction in score on her CHAVO-7 and PHQ-9 (scores of 15 and 16 at first measurment, respectively).     Intervention:   Person-Centered Therapy, Solution Focused Therapy, Internal Family Systems Therapy: Discussed patient's concerns with a recent problem with medication prices. Talked about recent challenges and how to find small moments of britney to overcome them. Processed emotions. Reviewed homework and identified successes and barriers to completion.  Reset goals for this upcoming week.       The following assessments were completed by patient for this visit:  PHQ9:   PHQ-9 SCORE 2/3/2022 2/8/2022 2/10/2022 2/15/2022 2/22/2022 2/22/2022 3/2/2022   PHQ-9 Total Score MyChart 14 (Moderate depression) 15 (Moderately severe depression) 18 (Moderately severe depression) 15 (Moderately severe depression) 17 (Moderately severe depression) 14 (Moderate depression) 13 (Moderate depression)   PHQ-9 Total Score 14 15 18 15 14 17 13   Some encounter information is confidential and restricted. Go to Review Flowsheets activity to see all data.     GAD2:   CHAVO-2 2/3/2022 2/3/2022 2/15/2022 2/15/2022 2/15/2022  3/2/2022 3/2/2022   Feeling nervous, anxious, or on edge 2 2 3 3 3 3 3   Not being able to stop or control worrying 2 2 3 3 3 3 3   CHAVO-2 Total Score 4 4 6 6 6 6 6     PROMIS 10-Global Health (only subscores and total score):   PROMIS-10 Scores Only 12/14/2021 12/14/2021   Global Mental Health Score 6 6   Global Physical Health Score 9 9   PROMIS TOTAL - SUBSCORES 15 15         ASSESSMENT: Current Emotional / Mental Status (status of significant symptoms):   Risk status (Self / Other harm or suicidal ideation)   Patient denies current fears or concerns for personal safety.   Patient denies current or recent suicidal ideation or behaviors.   Patient denies current or recent homicidal ideation or behaviors.   Patient denies current or recent self injurious behavior or ideation.   Patient denies other safety concerns.   Patient reports there has been no change in risk factors since their last session.     Patient reports there has been no change in protective factors since their last session.     A safety and risk management plan has been developed including: Patient has no change in safety concerns. Committed to safety and agreed to follow previously developed safety plan..  Patient consented to co-developed safety plan.  Safety and risk management plan was completed.  Patient agreed to use safety plan should any safety concerns arise.  A copy was given to the patient.     Appearance:   Appropriate    Eye Contact:   Good    Psychomotor Behavior: Normal    Attitude:   Cooperative    Orientation:   All   Speech    Rate / Production: Normal/ Responsive    Volume:  Normal    Mood:    Normal   Affect:    Appropriate    Thought Content:  Clear    Thought Form:  Coherent    Insight:    Good      Medication Review:   No changes to current psychiatric medication(s)     Medication Compliance:   Yes     Changes in Health Issues:   None reported     Chemical Use Review:   Substance Use: Chemical use reviewed, no active concerns  identified Nothing used since 2021.     Tobacco Use: No current tobacco use.      Diagnosis:  1. Bipolar 2 disorder (H)    2. Generalized anxiety disorder    3. Trauma and stressor-related disorder      Collateral Reports Completed:   Not Applicable    PLAN: (Patient Tasks / Therapist Tasks / Other)  Continue, from past weeks:  Look in May for couples therapist  Continue to journal before sessions  Continue to budget  Figure out when magazines are renewing and cancel      Next session- work with this part of yourself that spends the money- not wanting to miss out.     In the future, return to: the part that puts up walls and the part that is sensitive to rejection      MICAELA SLADE   2022                                                         ______________________________________________________________________    Individual Treatment Plan    Patient's Name: Randi Cleary  YOB: 1955    Date of Creation: 20  Date Treatment Plan Last Reviewed/Revised: 3/9/22    DSM5 Diagnoses: 296.89 Bipolar II Disorder Depressed, 300.02 (F41.1) Generalized Anxiety Disorder or Adjustment Disorders  309.89 (F43.8) Other Specified Trauma and Stressor Related Disorder  Psychosocial / Contextual Factors: Patient's entire family of origin has , she now has a sister-in-law and  as support.  Relationship with  is conflictual. She is recovering from surgeries  PROMIS (reviewed every 90 days): PROMIS-10 Scores               Referral / Collaboration:  Referral to another professional/service is not indicated at this time..    Anticipated number of session for this episode of care: 50  Anticipation frequency of session: Biweekly  Anticipated Duration of each session: 53 or more minutes due to intensity of trauma symptoms  Treatment plan will be reviewed in 90 days or when goals have been changed.   There has been demonstrated improvement in functioning while patient has been  "engaged in psychotherapy/psychological service- if withdrawn the patient would deteriorate and/or relapse.       MeasurableTreatment Goal(s) related to diagnosis / functional impairment(s)  Goal 1: Patient will \"jumpstart, getting going with the things I need to be doing around the house as far as picking up, doing things, trying to do something every day.  Also to lessen the animosity between me and my .\"    I will know I've met my goal when my shoulders are fixed and I can see.      Objective #A (Patient Action)    Patient will increase frequency of engaging her in ADLs.  Status: Continued - Date(s): 12/10/21, 3/9/22    Intervention(s)  Therapist will engage patient in CBT, specifically behavioral activation.    Objective #B  Patient will  track and record at least 5 pleasant exchanges with . Patient will be able to identify at least 5 positive traits about her  and how he relates to her.  Status: Continued - Date(s): 12/10/21, 3/9/22    Intervention(s)  Therapist will teach assertiveness skills and assign homework related to relationship interactions.    Objective #C  Patient will reduce level of depressive and anxious features as evidenced by reduction in score on her CHAVO-7 and PHQ-9 (scores of 15 and 16 at first measurment, respectively).  Status: Continued - Date(s): 12/10/21, 3/9/22    Intervention(s)  Therapist will engage patient in person-centered therapy and CBT.    Patient has reviewed and agreed to the above plan.      MICAELA SLADE  March 9, 2022                                                   Randi Cleary          SAFETY PLAN:  Step 1: Warning signs / cues (Thoughts, images, mood, situation, behavior) that a crisis may be developing:  ? Thoughts: \"I don't want to continue\" \"I am unwanted\"  ? Images: none  ? Thinking Processes: ruminating  ? Mood: anger  ? Behaviors: isolating/withdrawing , can't stop crying, not taking care of myself and not taking care of my " "responsibilities  ? Situations: small triggers, such as not being able to find something, or dropping something   Step 2: Coping strategies - Things I can do to take my mind off of my problems without contacting another person (relaxation technique, physical activity):  ? Distress Tolerance Strategies:  arts and crafts: drawing, play with my pet , listen to positive and upbeat music: any, change body temperature (ice pack/cold water)  and paced breathing/progressive muscle relaxation  ? Physical Activities: go for a walk, deep breathing and stretching   ? Focus on helpful thoughts:  \"You've been through this before, you can get through it again.\"  Step 3: People and social settings that provide distraction:                 Name: Carmen                            Name: Darien                           Name: Aleida       ? pool, shopping, Carmen's house, [employer]       Step 4: Remind myself of people and things that are important to me and worth living for:  Clifford Little Donna, post-COVID world, options of what could be in your future        Step 5: When I am in crisis, I can ask these people to help me use my safety plan:                 Name: Sidney  Step 6: Making the environment safe:   ? go to sleep/daydream  Step 7: Professionals or agencies I can contact during a crisis:  ? [facility] Number: [phone]  ? Suicide Prevention Lifeline: 3-794-176-SDWP (0890)  ? Crisis Text Line Service (available 24 hours a day, 7 days a week): Text [place] to 001568    Local Crisis Services: [facility] Crisis: [phone]     Call 911 or go to my nearest emergency department.       I helped develop this safety plan and agree to use it when needed.  I have been given a copy of this plan.       Client signature _________________________________________________________________  Today s date:  11/24/2020  Adapted from Safety Plan Template 2008 Randi Poole and Robby Barba is reprinted with the express " permission of the authors.  No portion of the Safety Plan Template may be reproduced without the express, written permission.  You can contact the authors at [email or web address] or [email or web address].

## 2022-03-10 ENCOUNTER — VIRTUAL VISIT (OUTPATIENT)
Dept: CARDIOLOGY | Facility: CLINIC | Age: 67
End: 2022-03-10
Attending: INTERNAL MEDICINE
Payer: MEDICARE

## 2022-03-10 DIAGNOSIS — R06.02 SHORTNESS OF BREATH: Primary | ICD-10-CM

## 2022-03-10 PROCEDURE — 99442 PR PHYSICIAN TELEPHONE EVALUATION 11-20 MIN: CPT | Mod: 95 | Performed by: INTERNAL MEDICINE

## 2022-03-10 ASSESSMENT — ENCOUNTER SYMPTOMS
HALLUCINATIONS: 0
FATIGUE: 1
INSOMNIA: 1
POLYDIPSIA: 0
ALTERED TEMPERATURE REGULATION: 1
NERVOUS/ANXIOUS: 1
WEIGHT GAIN: 1
FEVER: 0
PANIC: 1
DECREASED APPETITE: 0
CHILLS: 0
POLYPHAGIA: 0
NIGHT SWEATS: 0
DECREASED CONCENTRATION: 0
DEPRESSION: 1
WEIGHT LOSS: 0
INCREASED ENERGY: 0

## 2022-03-10 NOTE — PATIENT INSTRUCTIONS
Medication Changes:  -None    Patient Instructions:  1. Continue staying active and eat a heart healthy diet.    2. Please keep current list of medications with you at all times.    3. Remember to weigh yourself daily after voiding and before you consume any food or beverages and log the numbers.  If you have gained 2 pounds overnight or 5 pounds in a week contact us immediately for medication adjustments or further instructions.    4. **Please call us immediately if you have any syncope (fainting or passing out), chest pain, edema (swelling or weight gain), or decline in your functional status (general decline in how you are feeling).    5. Patients on Remodulin (treprostinil) or Veletri (epoprostenol): Please make sure that you have your backup pump and supplies with you at all times, your mixing instructions, and contact information for your specialty pharmacy.    Follow up Appointment Information:  -follow up in 1 year with Dr. Edwards with labs prior  -Echocardiogram at [facility]    We are located on the third floor of [facility] on the [facility].  Our address is     [address] [place] [address] [place] [address] [place] [address]    Thank you for allowing us to be a part of your care here at the [facility]    If you have questions or concerns please contact us at:    Eli Hilton RN, BSN, PHN  Josephine Pratt (Scheduling,Prior Auth)  Nurse Coordinator     Clinic   Pulmonary Hypertension   Pulmonary Hypertension  [facility]  (Phone)[phone]   (Phone) [phone]        (Fax) [phone]    ** Please note that you will NOT receive a reminder call regarding your scheduled testing, reminder calls are for provider appointments only.  If you are scheduled for testing within the [facility] system you may receive a call regarding pre-registration for  billing purposes only.**     Support Group:  Pulmonary Hypertension Association  [email or web address].phassociation.org/  **Look at the Events Tab** They even have Support Groups that you can call into    [facility] PH Support Group  Second Saturday of the Month from 1-3 PM   Location: [address] [place] [id number]  Leader: Corry Reeves  Phone: [phone]  Email: [email or web address]

## 2022-03-10 NOTE — LETTER
3/10/2022      RE: Randi Cleary  [address] [facility] [address]       Dear Colleague,    Thank you for the opportunity to participate in the care of your patient, Randi Cleary, at the [facility] [place] [facility]. Please see a copy of my visit note below.    Winnie is a 66 year old who is being evaluated via a billable video visit.        Francisco J Edwards M.D.  Cardiovascular Medicine            Telephone visit, none HIPPA compliant room   20 minutes    Loida Stockton MD    [address] [place] [address]    [phone] (Fax)          Reina Morillo MD    [address] [place] [address] [phone]     Problem List  1. Possible pulmonary hypertension/elevated PA with normal PVR.  2. History of breast cancer              History of mastectomy              Breast reconstruction  3. Atherosclerosis with coronary calcifications  4. Hiatal hernia  5. Hepatic steatosis  6. Diverticulosis  7. KYAW with treatment  8. Psoriasis  9. Grave's disease with ablation  10. Hypertension  11. Asthma  12. Hemochromatosis,gene +  13. Bipolar disorder ?  14. Chronic pain with narcotic dependence  15. Pheochromocytoma (right surgically removed)  16. Elevated body mass index  17. Elevate hemoglobin with macrocytosis  18  Hiatal hernia  19. Chronic pain management  20. Cervical stenosis  21. COVID 19 infection    Patient is vaccinated    Plan:  1. RTC in 12 months CBC, ferritin, CMP, BNP, lipid profile  2. Weight loss  3. Echocardiogram at [facility]    Normal coronary arteries.by examination    Wt Readings from Last 24 Encounters:   02/23/22 103.9 kg (229 lb 1.6 oz)   02/03/22 98 kg (216 lb)   12/21/21 98 kg (216 lb 1.6 oz)   12/13/21 100.7 kg (222 lb)   11/16/21 98.6 kg (217 lb 4.8 oz)   11/01/21 99.5 kg (219 lb 6.4 oz)   10/21/21 98.9 kg (218 lb)   10/18/21 100.2 kg (221 lb)    10/14/21 101.6 kg (223 lb 14.4 oz)   10/01/21 104.6 kg (230 lb 8 oz)   09/22/21 106.6 kg (235 lb)   08/24/21 106.8 kg (235 lb 6.4 oz)   08/24/21 102.5 kg (226 lb)   07/20/21 102.5 kg (226 lb)   07/13/21 102.8 kg (226 lb 9.6 oz)   06/04/21 98.9 kg (218 lb 1.6 oz)   05/18/21 97.5 kg (215 lb)   03/12/21 102 kg (224 lb 12.8 oz)   03/04/21 99.8 kg (220 lb)   02/25/21 100.7 kg (222 lb 0.1 oz)   01/29/21 101.7 kg (224 lb 3 oz)   01/07/21 125.6 kg (277 lb)   01/06/21 105.5 kg (232 lb 9.6 oz)   10/02/20 115.4 kg (254 lb 8 oz)       Meds  Current Outpatient Medications   Medication     acetaminophen (TYLENOL) 325 MG tablet     albuterol (PROVENTIL) (2.5 MG/3ML) 0.083% neb solution     albuterol (VENTOLIN HFA) 108 (90 Base) MCG/ACT inhaler     Cholecalciferol (VITAMIN D3) 250 MCG (22101 UT) TABS     Cyanocobalamin (VITAMIN B-12) 5000 MCG SUBL     ethacrynic acid (EDECRIN) 25 MG tablet     Fluticasone-Umeclidin-Vilanterol (TRELEGY ELLIPTA) 200-62.5-25 MCG/INH oral inhaler     HYDROcodone-acetaminophen (NORCO) 5-325 MG tablet     Lidocaine (LIDOCARE) 4 % Patch     LITHIUM PO     losartan (COZAAR) 25 MG tablet     magnesium 250 MG tablet     medical cannabis (Patient's own supply)     methocarbamol (ROBAXIN) 500 MG tablet     Multiple Vitamins-Iron (MULTIVITAMIN PLUS IRON ADULT) TABS     omeprazole (PRILOSEC) 20 MG DR capsule     OXcarbazepine (TRILEPTAL) 150 MG tablet     potassium chloride ER (KLOR-CON M) 20 MEQ CR tablet     rOPINIRole (REQUIP) 2 MG tablet     SYNTHROID 150 MCG tablet     Turmeric Curcumin 500 MG CAPS     vitamin (B COMPLEX-C) tablet     vitamin E 400 units TABS     No current facility-administered medications for this visit.       Labs    Results for GEETA BOSS (MRN [id number]) as of 3/10/2022 10:08   Ref. Range 2/23/2022 13:39   Sodium Latest Ref Range: 136 - 145 mmol/L 139   Potassium Latest Ref Range: 3.5 - 5.0 mmol/L 3.8   Chloride Latest Ref Range: 98 - 107 mmol/L 105   Carbon Dioxide Latest Ref  Range: 22 - 31 mmol/L 22   Urea Nitrogen Latest Ref Range: 8 - 22 mg/dL 10   Creatinine Latest Ref Range: 0.60 - 1.10 mg/dL 0.61   GFR Estimate Latest Ref Range: >60 mL/min/1.73m2 >90   Calcium Latest Ref Range: 8.5 - 10.5 mg/dL 9.6   Anion Gap Latest Ref Range: 5 - 18 mmol/L 12   Albumin Latest Ref Range: 3.5 - 5.0 g/dL 3.8   Protein Total Latest Ref Range: 6.0 - 8.0 g/dL 6.9   Bilirubin Total Latest Ref Range: 0.0 - 1.0 mg/dL 0.5   Alkaline Phosphatase Latest Ref Range: 45 - 120 U/L 64   ALT Latest Ref Range: 0 - 45 U/L 20   AST Latest Ref Range: 0 - 40 U/L 21   Creatinine Urine Latest Units: mg/dL 43   Ferritin Latest Ref Range: 10 - 130 ng/mL 78   Hemoglobin A1C Latest Ref Range: 0.0 - 5.6 % 5.5   Iron Latest Ref Range: 35 - 180 ug/dL 145   Iron Binding Cap Latest Ref Range: 240 - 430 ug/dL 317   Iron Saturation Index Latest Ref Range: 15 - 46 % 46   Microalbumin Urine mg/dL Latest Ref Range: 0.00 - 1.99 mg/dL <0.50   Microalbumin Urine mg/g Cr Unknown See Comment   N-Terminal Pro Bnp Latest Ref Range: 0 - 125 pg/mL 92   TSH Latest Ref Range: 0.30 - 5.00 uIU/mL 1.14   Vitamin B12 Latest Ref Range: 213 - 816 pg/mL 373   Vitamin D Deficiency screening Latest Ref Range: 30 - 80 ug/L 43   Glucose Latest Ref Range: 70 - 125 mg/dL 93   WBC Latest Ref Range: 4.0 - 11.0 10e3/uL 6.0   Hemoglobin Latest Ref Range: 11.7 - 15.7 g/dL 15.4   Hematocrit Latest Ref Range: 35.0 - 47.0 % 44.6   Platelet Count Latest Ref Range: 150 - 450 10e3/uL 240   RBC Count Latest Ref Range: 3.80 - 5.20 10e6/uL 4.69   MCV Latest Ref Range: 78 - 100 fL 95   MCH Latest Ref Range: 26.5 - 33.0 pg 32.8   MCHC Latest Ref Range: 31.5 - 36.5 g/dL 34.5   RDW Latest Ref Range: 10.0 - 15.0 % 12.1   % Neutrophils Latest Units: % 66   % Lymphocytes Latest Units: % 23   % Monocytes Latest Units: % 8   % Eosinophils Latest Units: % 1   % Basophils Latest Units: % 1   Absolute Basophils Latest Ref Range: 0.0 - 0.2 10e3/uL 0.0   Absolute Eosinophils Latest Ref  Range: 0.0 - 0.7 10e3/uL 0.1   Absolute Immature Granulocytes Latest Ref Range: <=0.4 10e3/uL 0.0   Absolute Lymphocytes Latest Ref Range: 0.8 - 5.3 10e3/uL 1.4   Absolute Monocytes Latest Ref Range: 0.0 - 1.3 10e3/uL 0.5   % Immature Granulocytes Latest Units: % 0   Absolute Neutrophils Latest Ref Range: 1.6 - 8.3 10e3/uL 4.0   % Retic Latest Ref Range: 0.8 - 2.7 % 2.1   Absolute Retic Latest Ref Range: 0.010 - 0.110 10e6/uL 0.097         Imaging     Right Heart Pressures     2019    RA  A-wave: 16 mmHg  V-wave: 16 mmHg  Mean: 15 mmHg   HR: 88 bpm  RV  Systolic: 44 mmHg  End Diastolic: 16 mmHg  HR: 88 bpm    PA  Systolic:41 mmHg  Diasotlic: 24 mmHg  Mean: 31 mmHg  HR: 88 bpm  PA Sat: 70.8%    PCW  A-wave: 18 mmHg  V-wave: 17 mmHg  Mean: 16 mmHg  HR: 91 bpm    Cardiac Output  CO Jeannie: 5.72 L/min  CI Jeannie: 2.56 L/min/m2  CO TD: Not performed  CI TD: Not performed     Vitals  BP: 168 mmHg / 74 mmHg  SpO2: 92 %  PA Stat: 70.8 %         Echocardiogram        Name: ERIK BOSS  MRN: [id number]  : 1955  Study Date: 2019 03:04 PM  Age: 64 yrs  Gender: Female  Patient Location: [facility]  Reason For Study: Pulmonary hypertension (H)  Ordering Physician: MINO CORTES  Referring Physician: MINO CORTES  Performed By: Siobhan Dee RDCS     BSA: 2.2 m2  Height: 66 in  Weight: 259 lb  BP: 118/76 mmHg  _____________________________________________________________________________  __        Procedure  Echocardiogram with two-dimensional, color and spectral Doppler performed.  Poor acoustic windows. Optison (NDC #3225-6001-69) given intravenously.  Patient was given 3.0 ml mixture of 3 ml Optison and 6 ml saline. 6.0 ml  wasted. IV start location R Hand .  _____________________________________________________________________________  __        Interpretation Summary  1. Global and regional left ventricular function is normal with an EF of 55-  60%.  2. The right ventricle is normal size.  Global right ventricular function is  mildly reduced.  3. No significant valvular disease.  4. Unable to assess pulmonary artery pressure.  5. IVC diameter <2.1 cm collapsing >50% with sniff suggests a normal RA  pressure of 3 mmHg.     Catheterization    10/03/2019         Right sided filling pressures are moderately elevated.    Moderately elevated pulmonary artery hypertension.    Left sided filling pressures are mildly elevated.    Normal cardiac output level.     1. Normal coronary arteries.                  Diagnostic  Dominance: Right  Left Anterior Descending   First Diagonal Branch   The vessel is moderate in size.     Intervention    No interventions have been documented.  Hemodynamics    Hemodynamics Phase: Baseline    RA   A-wave: 16 mmHg   V-wave: 16 mmHg   Mean: 15 mmHg    HR: 88 bpm  RV   Systolic: 44 mmHg   End Diastolic: 16 mmHg   HR: 88 bpm    PA   Systolic:41 mmHg   Diasotlic: 24 mmHg   Mean: 31 mmHg   HR: 88 bpm   PA Sat: 70.8%    PCW   A-wave: 18 mmHg   V-wave: 17 mmHg   Mean: 16 mmHg   HR: 91 bpm    Cardiac Output   CO Jeannie: 5.72 L/min   CI Jeannie: 2.56 L/min/m2   CO TD:  Not performed   CI TD:  Not performed      Vitals   BP: 168 mmHg / 74 mmHg   SpO2: 92 %   PA Stat: 70.8 %    Resistance Metric   PVR index: 5.86 QUIROZ/m2                      CC: physicians above and    Dusty Dubois M.D.  ([facility]      Answers for HPI/ROS submitted by the patient on 3/10/2022  General Symptoms: Yes  Skin Symptoms: No  HENT Symptoms: No  EYE SYMPTOMS: No  HEART SYMPTOMS: No  LUNG SYMPTOMS: No  INTESTINAL SYMPTOMS: No  URINARY SYMPTOMS: No  GYNECOLOGIC SYMPTOMS: No  BREAST SYMPTOMS: No  SKELETAL SYMPTOMS: No  BLOOD SYMPTOMS: No  NERVOUS SYSTEM SYMPTOMS: No  MENTAL HEALTH SYMPTOMS: Yes  Fever: No  Loss of appetite: No  Weight loss: No  Weight gain: Yes  Fatigue: Yes  Night sweats: No  Chills: No  Increased stress: Yes  Excessive hunger: No  Excessive thirst: No  Feeling hot or  cold when others believe the temperature is normal: Yes  Loss of height: No  Post-operative complications: No  Surgical site pain: Yes  Hallucinations: No  Change in or Loss of Energy: No  Hyperactivity: No  Confusion: No  Nervous or Anxious: Yes  Depression: Yes  Trouble sleeping: Yes  Trouble thinking or concentrating: No  Mood changes: Yes  Panic attacks: Yes        Please do not hesitate to contact me if you have any questions/concerns.     Sincerely,     Francisco J Edwards MD

## 2022-03-10 NOTE — NURSING NOTE
Chief Complaint   Patient presents with     Follow Up     PH follow up. Has an order for an Echo. Pt reviewed medications via [facility], no changes.      Adolfo Felix, Visit Facilitator/MA.

## 2022-03-10 NOTE — PROGRESS NOTES
Winnie is a 66 year old who is being evaluated via a billable video visit.        Francisco J Edwards M.D.  Cardiovascular Medicine            Telephone visit, none HIPPA compliant room   20 minutes    Loida Stockton MD    [address] [place] [address]    [phone] (Fax)          Reina Morillo MD    [address] [place] [address] [phone]     Problem List  1. Possible pulmonary hypertension/elevated PA with normal PVR.  2. History of breast cancer              History of mastectomy              Breast reconstruction  3. Atherosclerosis with coronary calcifications  4. Hiatal hernia  5. Hepatic steatosis  6. Diverticulosis  7. KYAW with treatment  8. Psoriasis  9. Grave's disease with ablation  10. Hypertension  11. Asthma  12. Hemochromatosis,gene +  13. Bipolar disorder ?  14. Chronic pain with narcotic dependence  15. Pheochromocytoma (right surgically removed)  16. Elevated body mass index  17. Elevate hemoglobin with macrocytosis  18  Hiatal hernia  19. Chronic pain management  20. Cervical stenosis  21. COVID 19 infection    Patient is vaccinated    Plan:  1. RTC in 12 months CBC, ferritin, CMP, BNP, lipid profile  2. Weight loss  3. E[facility]ocardiogram at [facility]    Normal coronary arteries.by examination    Wt Readings from Last 24 Encounters:   02/23/22 103.9 kg (229 lb 1.6 oz)   02/03/22 98 kg (216 lb)   12/21/21 98 kg (216 lb 1.6 oz)   12/13/21 100.7 kg (222 lb)   11/16/21 98.6 kg (217 lb 4.8 oz)   11/01/21 99.5 kg (219 lb 6.4 oz)   10/21/21 98.9 kg (218 lb)   10/18/21 100.2 kg (221 lb)   10/14/21 101.6 kg (223 lb 14.4 oz)   10/01/21 104.6 kg (230 lb 8 oz)   09/22/21 106.6 kg (235 lb)   08/24/21 106.8 kg (235 lb 6.4 oz)   08/24/21 102.5 kg (226 lb)   07/20/21 102.5 kg (226 lb)   07/13/21 102.8 kg (226 lb 9.6 oz)   06/04/21 98.9 kg (218 lb 1.6 oz)   05/18/21 97.5 kg (215 lb)   03/12/21 102 kg (224 lb 12.8 oz)   03/04/21 99.8 kg (220 lb)    02/25/21 100.7 kg (222 lb 0.1 oz)   01/29/21 101.7 kg (224 lb 3 oz)   01/07/21 125.6 kg (277 lb)   01/06/21 105.5 kg (232 lb 9.6 oz)   10/02/20 115.4 kg (254 lb 8 oz)       Meds  Current Outpatient Medications   Medication     acetaminophen (TYLENOL) 325 MG tablet     albuterol (PROVENTIL) (2.5 MG/3ML) 0.083% neb solution     albuterol (VENTOLIN HFA) 108 (90 Base) MCG/ACT inhaler     Cholecalciferol (VITAMIN D3) 250 MCG (42739 UT) TABS     Cyanocobalamin (VITAMIN B-12) 5000 MCG SUBL     ethacrynic acid (EDECRIN) 25 MG tablet     Fluticasone-Umeclidin-Vilanterol (TRELEGY ELLIPTA) 200-62.5-25 MCG/INH oral inhaler     HYDROcodone-acetaminophen (NORCO) 5-325 MG tablet     Lidocaine (LIDOCARE) 4 % Patch     LITHIUM PO     losartan (COZAAR) 25 MG tablet     magnesium 250 MG tablet     medical cannabis (Patient's own supply)     methocarbamol (ROBAXIN) 500 MG tablet     Multiple Vitamins-Iron (MULTIVITAMIN PLUS IRON ADULT) TABS     omeprazole (PRILOSEC) 20 MG DR capsule     OXcarbazepine (TRILEPTAL) 150 MG tablet     potassium chloride ER (KLOR-CON M) 20 MEQ CR tablet     rOPINIRole (REQUIP) 2 MG tablet     SYNTHROID 150 MCG tablet     Turmeric Curcumin 500 MG CAPS     vitamin (B COMPLEX-C) tablet     vitamin E 400 units TABS     No current facility-administered medications for this visit.       Labs    Results for GEETA BOSS (MRN [id number]) as of 3/10/2022 10:08   Ref. Range 2/23/2022 13:39   Sodium Latest Ref Range: 136 - 145 mmol/L 139   Potassium Latest Ref Range: 3.5 - 5.0 mmol/L 3.8   Chloride Latest Ref Range: 98 - 107 mmol/L 105   Carbon Dioxide Latest Ref Range: 22 - 31 mmol/L 22   Urea Nitrogen Latest Ref Range: 8 - 22 mg/dL 10   Creatinine Latest Ref Range: 0.60 - 1.10 mg/dL 0.61   GFR Estimate Latest Ref Range: >60 mL/min/1.73m2 >90   Calcium Latest Ref Range: 8.5 - 10.5 mg/dL 9.6   Anion Gap Latest Ref Range: 5 - 18 mmol/L 12   Albumin Latest Ref Range: 3.5 - 5.0 g/dL 3.8   Protein Total Latest Ref  Range: 6.0 - 8.0 g/dL 6.9   Bilirubin Total Latest Ref Range: 0.0 - 1.0 mg/dL 0.5   Alkaline Phosphatase Latest Ref Range: 45 - 120 U/L 64   ALT Latest Ref Range: 0 - 45 U/L 20   AST Latest Ref Range: 0 - 40 U/L 21   Creatinine Urine Latest Units: mg/dL 43   Ferritin Latest Ref Range: 10 - 130 ng/mL 78   Hemoglobin A1C Latest Ref Range: 0.0 - 5.6 % 5.5   Iron Latest Ref Range: 35 - 180 ug/dL 145   Iron Binding Cap Latest Ref Range: 240 - 430 ug/dL 317   Iron Saturation Index Latest Ref Range: 15 - 46 % 46   Microalbumin Urine mg/dL Latest Ref Range: 0.00 - 1.99 mg/dL <0.50   Microalbumin Urine mg/g Cr Unknown See Comment   N-Terminal Pro Bnp Latest Ref Range: 0 - 125 pg/mL 92   TSH Latest Ref Range: 0.30 - 5.00 uIU/mL 1.14   Vitamin B12 Latest Ref Range: 213 - 816 pg/mL 373   Vitamin D Deficiency screening Latest Ref Range: 30 - 80 ug/L 43   Glucose Latest Ref Range: 70 - 125 mg/dL 93   WBC Latest Ref Range: 4.0 - 11.0 10e3/uL 6.0   Hemoglobin Latest Ref Range: 11.7 - 15.7 g/dL 15.4   Hematocrit Latest Ref Range: 35.0 - 47.0 % 44.6   Platelet Count Latest Ref Range: 150 - 450 10e3/uL 240   RBC Count Latest Ref Range: 3.80 - 5.20 10e6/uL 4.69   MCV Latest Ref Range: 78 - 100 fL 95   MCH Latest Ref Range: 26.5 - 33.0 pg 32.8   MCHC Latest Ref Range: 31.5 - 36.5 g/dL 34.5   RDW Latest Ref Range: 10.0 - 15.0 % 12.1   % Neutrophils Latest Units: % 66   % Lymphocytes Latest Units: % 23   % Monocytes Latest Units: % 8   % Eosinophils Latest Units: % 1   % Basophils Latest Units: % 1   Absolute Basophils Latest Ref Range: 0.0 - 0.2 10e3/uL 0.0   Absolute Eosinophils Latest Ref Range: 0.0 - 0.7 10e3/uL 0.1   Absolute Immature Granulocytes Latest Ref Range: <=0.4 10e3/uL 0.0   Absolute Lymphocytes Latest Ref Range: 0.8 - 5.3 10e3/uL 1.4   Absolute Monocytes Latest Ref Range: 0.0 - 1.3 10e3/uL 0.5   % Immature Granulocytes Latest Units: % 0   Absolute Neutrophils Latest Ref Range: 1.6 - 8.3 10e3/uL 4.0   % Retic Latest Ref  Range: 0.8 - 2.7 % 2.1   Absolute Retic Latest Ref Range: 0.010 - 0.110 10e6/uL 0.097         Imaging     Right Heart Pressures     2019    RA  A-wave: 16 mmHg  V-wave: 16 mmHg  Mean: 15 mmHg   HR: 88 bpm  RV  Systolic: 44 mmHg  End Diastolic: 16 mmHg  HR: 88 bpm    PA  Systolic:41 mmHg  Diasotlic: 24 mmHg  Mean: 31 mmHg  HR: 88 bpm  PA Sat: 70.8%    PCW  A-wave: 18 mmHg  V-wave: 17 mmHg  Mean: 16 mmHg  HR: 91 bpm    Cardiac Output  CO Jeannie: 5.72 L/min  CI Jeannie: 2.56 L/min/m2  CO TD: Not performed  CI TD: Not performed     Vitals  BP: 168 mmHg / 74 mmHg  SpO2: 92 %  PA Stat: 70.8 %         Echocardiogram        Name: ERIK BOSS  MRN: [id number]  : 1955  Study Date: 2019 03:04 PM  Age: 64 yrs  Gender: Female  Patient Location: [facility]  Reason For Study: Pulmonary hypertension (H)  Ordering Physician: MINO CORTES  Referring Physician: MINO CORTES  Performed By: Siobhan Dee ASHTYN     BSA: 2.2 m2  Height: 66 in  Weight: 259 lb  BP: 118/76 mmHg  _____________________________________________________________________________  __        Procedure  Echocardiogram with two-dimensional, color and spectral Doppler performed.  Poor acoustic windows. Optison (NDC #3954-5017-51) given intravenously.  Patient was given 3.0 ml mixture of 3 ml Optison and 6 ml saline. 6.0 ml  wasted. IV start location R Hand .  _____________________________________________________________________________  __        Interpretation Summary  1. Global and regional left ventricular function is normal with an EF of 55-  60%.  2. The right ventricle is normal size. Global right ventricular function is  mildly reduced.  3. No significant valvular disease.  4. Unable to assess pulmonary artery pressure.  5. IVC diameter <2.1 cm collapsing >50% with sniff suggests a normal RA  pressure of 3 mmHg.     Catheterization    10/03/2019         Right sided filling pressures are moderately elevated.    Moderately elevated  pulmonary artery hypertension.    Left sided filling pressures are mildly elevated.    Normal cardiac output level.     1. Normal coronary arteries.                  Diagnostic  Dominance: Right  Left Anterior Descending   First Diagonal Branch   The vessel is moderate in size.     Intervention    No interventions have been documented.  Hemodynamics    Hemodynamics Phase: Baseline    RA   A-wave: 16 mmHg   V-wave: 16 mmHg   Mean: 15 mmHg    HR: 88 bpm  RV   Systolic: 44 mmHg   End Diastolic: 16 mmHg   HR: 88 bpm    PA   Systolic:41 mmHg   Diasotlic: 24 mmHg   Mean: 31 mmHg   HR: 88 bpm   PA Sat: 70.8%    PCW   A-wave: 18 mmHg   V-wave: 17 mmHg   Mean: 16 mmHg   HR: 91 bpm    Cardiac Output   CO Jeannie: 5.72 L/min   CI Jeannie: 2.56 L/min/m2   CO TD:  Not performed   CI TD:  Not performed      Vitals   BP: 168 mmHg / 74 mmHg   SpO2: 92 %   PA Stat: 70.8 %    Resistance Metric   PVR index: 5.86 QUIROZ/m2                      CC: physicians above and    Dusty Dubois M.D.  [facility]      Answers for HPI/ROS submitted by the patient on 3/10/2022  General Symptoms: Yes  Skin Symptoms: No  HENT Symptoms: No  EYE SYMPTOMS: No  HEART SYMPTOMS: No  LUNG SYMPTOMS: No  INTESTINAL SYMPTOMS: No  URINARY SYMPTOMS: No  GYNECOLOGIC SYMPTOMS: No  BREAST SYMPTOMS: No  SKELETAL SYMPTOMS: No  BLOOD SYMPTOMS: No  NERVOUS SYSTEM SYMPTOMS: No  MENTAL HEALTH SYMPTOMS: Yes  Fever: No  Loss of appetite: No  Weight loss: No  Weight gain: Yes  Fatigue: Yes  Night sweats: No  Chills: No  Increased stress: Yes  Excessive hunger: No  Excessive thirst: No  Feeling hot or cold when others believe the temperature is normal: Yes  Loss of height: No  Post-operative complications: No  Surgical site pain: Yes  Hallucinations: No  Change in or Loss of Energy: No  Hyperactivity: No  Confusion: No  Nervous or Anxious: Yes  Depression: Yes  Trouble sleeping: Yes  Trouble thinking or concentrating: No  Mood changes: Yes  Panic  attacks: Yes

## 2022-03-15 ENCOUNTER — VIRTUAL VISIT (OUTPATIENT)
Dept: PSYCHOLOGY | Facility: CLINIC | Age: 67
End: 2022-03-15
Payer: MEDICARE

## 2022-03-15 DIAGNOSIS — F43.9 TRAUMA AND STRESSOR-RELATED DISORDER: ICD-10-CM

## 2022-03-15 DIAGNOSIS — F31.81 BIPOLAR 2 DISORDER (H): Primary | ICD-10-CM

## 2022-03-15 DIAGNOSIS — F41.1 GENERALIZED ANXIETY DISORDER: ICD-10-CM

## 2022-03-15 PROCEDURE — 90834 PSYTX W PT 45 MINUTES: CPT | Mod: 95 | Performed by: SOCIAL WORKER

## 2022-03-15 ASSESSMENT — PATIENT HEALTH QUESTIONNAIRE - PHQ9
SUM OF ALL RESPONSES TO PHQ QUESTIONS 1-9: 15
SUM OF ALL RESPONSES TO PHQ QUESTIONS 1-9: 15
10. IF YOU CHECKED OFF ANY PROBLEMS, HOW DIFFICULT HAVE THESE PROBLEMS MADE IT FOR YOU TO DO YOUR WORK, TAKE CARE OF THINGS AT HOME, OR GET ALONG WITH OTHER PEOPLE: VERY DIFFICULT
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
## 2022-03-16 ASSESSMENT — PATIENT HEALTH QUESTIONNAIRE - PHQ9: SUM OF ALL RESPONSES TO PHQ QUESTIONS 1-9: 15

## 2022-03-17 DIAGNOSIS — G95.20 CORD COMPRESSION (H): Chronic | ICD-10-CM

## 2022-03-17 DIAGNOSIS — M47.12 CERVICAL SPONDYLOSIS WITH MYELOPATHY: ICD-10-CM

## 2022-03-17 DIAGNOSIS — M54.12 CERVICAL RADICULOPATHY: ICD-10-CM

## 2022-03-17 RX ORDER — METHOCARBAMOL 500 MG/1
500 TABLET, FILM COATED ORAL 4 TIMES DAILY
Qty: 30 TABLET | Refills: 0 | Status: SHIPPED | OUTPATIENT
Start: 2022-03-17 | End: 2022-03-31

## 2022-03-17 RX ORDER — HYDROCODONE BITARTRATE AND ACETAMINOPHEN 5; 325 MG/1; MG/1
1 TABLET ORAL 3 TIMES DAILY PRN
Qty: 42 TABLET | Refills: 0 | Status: SHIPPED | OUTPATIENT
Start: 2022-03-17 | End: 2022-03-31

## 2022-03-17 NOTE — TELEPHONE ENCOUNTER
Received call from patient requesting refill(s) of Norco and Robaxin     Last dispensed from pharmacy on   [place] 2/26/22  Robaxin 2/21/22    Patient's last office/virtual visit by prescribing provider on 2/17/22  Next office/virtual appointment scheduled for 4/6/22    Last urine drug screen date 12/2021  Current opioid agreement on file (completed within the last year) Yes Date of opioid agreement: 12/2021    E-prescribe to [facility]  Pending Prescriptions:                       Disp   Refills    HYDROcodone-acetaminophen (NORCO) 5-325 M*42 tab*0            Sig: Take 1 tablet by mouth 3 times daily as needed           for breakthrough pain or moderate to severe pain    methocarbamol (ROBAXIN) 500 MG tablet     30 tab*0            Sig: Take 1 tablet (500 mg) by mouth 4 times daily           Wean down on your frequency.

## 2022-03-18 ENCOUNTER — VIRTUAL VISIT (OUTPATIENT)
Dept: PSYCHOLOGY | Facility: CLINIC | Age: 67
End: 2022-03-18
Payer: MEDICARE

## 2022-03-18 DIAGNOSIS — F43.9 TRAUMA AND STRESSOR-RELATED DISORDER: ICD-10-CM

## 2022-03-18 DIAGNOSIS — F41.1 GENERALIZED ANXIETY DISORDER: ICD-10-CM

## 2022-03-18 DIAGNOSIS — F31.81 BIPOLAR 2 DISORDER (H): Primary | ICD-10-CM

## 2022-03-18 PROCEDURE — 90834 PSYTX W PT 45 MINUTES: CPT | Mod: 95 | Performed by: SOCIAL WORKER

## 2022-03-18 ASSESSMENT — PATIENT HEALTH QUESTIONNAIRE - PHQ9
10. IF YOU CHECKED OFF ANY PROBLEMS, HOW DIFFICULT HAVE THESE PROBLEMS MADE IT FOR YOU TO DO YOUR WORK, TAKE CARE OF THINGS AT HOME, OR GET ALONG WITH OTHER PEOPLE: SOMEWHAT DIFFICULT
SUM OF ALL RESPONSES TO PHQ QUESTIONS 1-9: 11
SUM OF ALL RESPONSES TO PHQ QUESTIONS 1-9: 11

## 2022-03-18 NOTE — PROGRESS NOTES
[facility]                                     Progress Note    Patient Name: Randi Cleary  Date: 3/18/22         Service Type: Individual     Session Start Time: 8:04 AM  Session End Time: 8:54 AM     Session Length: 50 minutes    Session #: 94    Attendees: Client attended alone    Service Modality:  Video Visit:      Provider verified identity through the following two step process.  Patient provided:  Patient is known previously to provider    Telemedicine Visit: The patient's condition can be safely assessed and treated via synchronous audio and visual telemedicine encounter.      Reason for Telemedicine Visit: Services only offered telehealth    Originating Site (Patient Location): Patient's home    Distant Site (Provider Location): Provider Remote Setting- Home Office    Consent:  The patient/guardian has verbally consented to: the potential risks and benefits of telemedicine (video visit) versus in person care; bill my insurance or make self-payment for services provided; and responsibility for payment of non-covered services.     Patient would like the video invitation sent by:  My Chart    Mode of Communication:  Video Conference via [employer]well    As the provider I attest to compliance with applicable laws and regulations related to telemedicine.    DATA  Extended Session (53+ minutes): No  Interactive Complexity: No  Crisis: No        Progress Since Last Session (Related to Symptoms / Goals / Homework):   Symptoms: No change since prior session    Homework: Partially completed  Continue, from past weeks:  Look in May for couples therapist -not yet done  Continue to journal before sessions -done  Continue to budget -done some  Figure out when magazines are renewing and cancel -not yet done     Episode of Care Goals: Satisfactory progress - ACTION (Actively working towards change); Intervened by reinforcing change plan / affirming steps taken     Current / Ongoing Stressors and  Concerns:   Patient is currently socially isolated. She has a conflictual relationship with her .  She is getting minimal physical activity.  She had recent eye surgery and shoulder surgery.     Treatment Objective(s) Addressed in This Session:   Patient will increase frequency of engaging her in ADLs.  Patient will track and record at least 5 pleasant exchanges with . Patient will be able to identify at least 5 positive traits about her .  Patient will reduce level of depressive and anxious features as evidenced by reduction in score on her CHAVO-7 and PHQ-9 (scores of 15 and 16 at first measurment, respectively).     Intervention:   Person-Centered Therapy, Solution Focused Therapy, Internal Family Systems Therapy: Patient shared that she's been investing, talking about some of the excitement of doing so. Talked about if there's a thrill with this and if she can afford to lose this. Discussed finding some balance in doing other things with her time, and not spending so much time on investing as right now it is partially for chasing excitement, so finding other ways to fill this need, then making sure the investing is just about finances and not just for the excitement.    The following assessments were completed by patient for this visit:  PHQ9:   PHQ-9 SCORE 2/10/2022 2/15/2022 2/22/2022 2/22/2022 3/2/2022 3/15/2022 3/18/2022   PHQ-9 Total Score MyChart 18 (Moderately severe depression) 15 (Moderately severe depression) 17 (Moderately severe depression) 14 (Moderate depression) 13 (Moderate depression) 15 (Moderately severe depression) 11 (Moderate depression)   PHQ-9 Total Score 18 15 14 17 13 15 11   Some encounter information is confidential and restricted. Go to Review Flowsheets activity to see all data.     GAD2:   CHAVO-2 2/15/2022 2/15/2022 3/2/2022 3/2/2022 3/15/2022 3/15/2022 3/15/2022   Feeling nervous, anxious, or on edge 3 3 3 3 3 3 3   Not being able to stop or control worrying 3 3  3 3 2 2 2   CHAVO-2 Total Score 6 6 6 6 5 5 5     PROMIS 10-Global Health (only subscores and total score):   PROMIS-10 Scores Only 12/14/2021 12/14/2021 3/15/2022 3/15/2022 3/15/2022   Global Mental Health Score 6 6 6 6 6   Global Physical Health Score 9 9 9 9 9   PROMIS TOTAL - SUBSCORES 15 15 15 15 15         ASSESSMENT: Current Emotional / Mental Status (status of significant symptoms):   Risk status (Self / Other harm or suicidal ideation)   Patient denies current fears or concerns for personal safety.   Patient denies current or recent suicidal ideation or behaviors.   Patient denies current or recent homicidal ideation or behaviors.   Patient denies current or recent self injurious behavior or ideation.   Patient denies other safety concerns.   Patient reports there has been no change in risk factors since their last session.     Patient reports there has been no change in protective factors since their last session.     A safety and risk management plan has been developed including: Patient has no change in safety concerns. Committed to safety and agreed to follow previously developed safety plan..  Patient consented to co-developed safety plan.  Safety and risk management plan was completed.  Patient agreed to use safety plan should any safety concerns arise.  A copy was given to the patient.     Appearance:   Appropriate    Eye Contact:   Good    Psychomotor Behavior: Normal    Attitude:   Cooperative    Orientation:   All   Speech    Rate / Production: Normal/ Responsive    Volume:  Normal    Mood:    Normal   Affect:    Appropriate    Thought Content:  Clear    Thought Form:  Coherent    Insight:    Good      Medication Review:   No changes to current psychiatric medication(s)     Medication Compliance:   Yes     Changes in Health Issues:   None reported     Chemical Use Review:   Substance Use: Chemical use reviewed, no active concerns identified Nothing used since August 2021.     Tobacco Use: No current  tobacco use.      Diagnosis:  1. Bipolar 2 disorder (H)    2. Generalized anxiety disorder    3. Trauma and stressor-related disorder      Collateral Reports Completed:   Not Applicable    PLAN: (Patient Tasks / Therapist Tasks / Other)  Talk to Sidney about investing    Continue, from past weeks:  Look in May for couples therapist  Continue to journal before sessions  Continue to budget  Figure out when magazines are renewing and cancel      Next session- work with this part of yourself that spends the money- not wanting to miss out, as well as how you're hurt by family and money     In the future, return to: the part that puts up walls and the part that is sensitive to rejection      There has been demonstrated improvement in functioning while patient has been engaged in psychotherapy/psychological service- if withdrawn the patient would deteriorate and/or relapse.     MICAELA SLADE   2022                                                         ______________________________________________________________________    Individual Treatment Plan    Patient's Name: Randi Cleary  YOB: 1955    Date of Creation: 20  Date Treatment Plan Last Reviewed/Revised: 3/9/22    DSM5 Diagnoses: 296.89 Bipolar II Disorder Depressed, 300.02 (F41.1) Generalized Anxiety Disorder or Adjustment Disorders  309.89 (F43.8) Other Specified Trauma and Stressor Related Disorder  Psychosocial / Contextual Factors: Patient's entire family of origin has , she now has a sister-in-law and  as support.  Relationship with  is conflictual. She is recovering from surgeries  PROMIS (reviewed every 90 days): PROMIS-10 Scores  Global Mental Health Score: (P) 6  Global Physical Health Score: (P) 9   PROMIS TOTAL - SUBSCORES: (P) 15     Referral / Collaboration:  Referral to another professional/service is not indicated at this time..    Anticipated number of session for this episode of care:  "50  Anticipation frequency of session: Biweekly  Anticipated Duration of each session: 53 or more minutes due to intensity of trauma symptoms  Treatment plan will be reviewed in 90 days or when goals have been changed.   There has been demonstrated improvement in functioning while patient has been engaged in psychotherapy/psychological service- if withdrawn the patient would deteriorate and/or relapse.       MeasurableTreatment Goal(s) related to diagnosis / functional impairment(s)  Goal 1: Patient will \"jumpstart, getting going with the things I need to be doing around the house as far as picking up, doing things, trying to do something every day.  Also to lessen the animosity between me and my .\"    I will know I've met my goal when my shoulders are fixed and I can see.      Objective #A (Patient Action)    Patient will increase frequency of engaging her in ADLs.  Status: Continued - Date(s): 12/10/21, 3/9/22    Intervention(s)  Therapist will engage patient in CBT, specifically behavioral activation.    Objective #B  Patient will  track and record at least 5 pleasant exchanges with . Patient will be able to identify at least 5 positive traits about her  and how he relates to her.  Status: Continued - Date(s): 12/10/21, 3/9/22    Intervention(s)  Therapist will teach assertiveness skills and assign homework related to relationship interactions.    Objective #C  Patient will reduce level of depressive and anxious features as evidenced by reduction in score on her CHAVO-7 and PHQ-9 (scores of 15 and 16 at first measurment, respectively).  Status: Continued - Date(s): 12/10/21, 3/9/22    Intervention(s)  Therapist will engage patient in person-centered therapy and CBT.    Patient has reviewed and agreed to the above plan.      MICAELA SLADE  March 9, 2022                                                   Randi Cleary          SAFETY PLAN:  Step 1: Warning signs / cues (Thoughts, images, " "mood, situation, behavior) that a crisis may be developing:  ? Thoughts: \"I don't want to continue\" \"I am unwanted\"  ? Images: none  ? Thinking Processes: ruminating  ? Mood: anger  ? Behaviors: isolating/withdrawing , can't stop crying, not taking care of myself and not taking care of my responsibilities  ? Situations: small triggers, such as not being able to find something, or dropping something   Step 2: Coping strategies - Things I can do to take my mind off of my problems without contacting another person (relaxation technique, physical activity):  ? Distress Tolerance Strategies:  arts and crafts: drawing, play with my pet , listen to positive and upbeat music: any, change body temperature (ice pack/cold water)  and paced breathing/progressive muscle relaxation  ? Physical Activities: go for a walk, deep breathing and stretching   ? Focus on helpful thoughts:  \"You've been through this before, you can get through it again.\"  Step 3: People and social settings that provide distraction:                 Name: Carmen                            Name: Darien                           Name: Aleida       ? pool, shopping, Carmen's house, [employer]       Step 4: Remind myself of people and things that are important to me and worth living for:  Clifford Little Donna, post-COVID world, options of what could be in your future        Step 5: When I am in crisis, I can ask these people to help me use my safety plan:                 Name: Sidney  Step 6: Making the environment safe:   ? go to sleep/daydream  Step 7: Professionals or agencies I can contact during a crisis:  ? [facility] Daytime Number: [phone]  ? Suicide Prevention Lifeline: 1-660-101-ANDR (0361)  ? Crisis Text Line Service (available 24 hours a day, 7 days a week): Text [place] to 977514    Local Crisis Services: [facility] Crisis: [phone]     Call 911 or go to my nearest emergency department.       I helped develop this safety plan and " agree to use it when needed.  I have been given a copy of this plan.       Client signature _________________________________________________________________  Today s date:  11/24/2020  Adapted from Safety Plan Template 2008 Randi Poole and Robby Barba is reprinted with the express permission of the authors.  No portion of the Safety Plan Template may be reproduced without the express, written permission.  You can contact the authors at [email or web address] or [email or web address].

## 2022-03-19 ASSESSMENT — PATIENT HEALTH QUESTIONNAIRE - PHQ9: SUM OF ALL RESPONSES TO PHQ QUESTIONS 1-9: 11

## 2022-03-21 ENCOUNTER — VIRTUAL VISIT (OUTPATIENT)
Dept: PSYCHOLOGY | Facility: CLINIC | Age: 67
End: 2022-03-21
Payer: MEDICARE

## 2022-03-21 DIAGNOSIS — F43.9 TRAUMA AND STRESSOR-RELATED DISORDER: ICD-10-CM

## 2022-03-21 DIAGNOSIS — F41.1 GENERALIZED ANXIETY DISORDER: ICD-10-CM

## 2022-03-21 DIAGNOSIS — F31.81 BIPOLAR 2 DISORDER (H): Primary | ICD-10-CM

## 2022-03-21 PROCEDURE — 90834 PSYTX W PT 45 MINUTES: CPT | Mod: 95 | Performed by: SOCIAL WORKER

## 2022-03-21 NOTE — PROGRESS NOTES
[facility]                                     Progress Note    Patient Name: Randi Cleary  Date: 3/21/22         Service Type: Individual     Session Start Time: 2:02 PM  Session End Time: 2:47 PM     Session Length: 45 minutes    Session #: 95    Attendees: Client attended alone    Service Modality:  Video Visit:      Provider verified identity through the following two step process.  Patient provided:  Patient is known previously to provider    Telemedicine Visit: The patient's condition can be safely assessed and treated via synchronous audio and visual telemedicine encounter.      Reason for Telemedicine Visit: Services only offered telehealth    Originating Site (Patient Location): Patient's home    Distant Site (Provider Location): Provider Remote Setting- Home Office    Consent:  The patient/guardian has verbally consented to: the potential risks and benefits of telemedicine (video visit) versus in person care; bill my insurance or make self-payment for services provided; and responsibility for payment of non-covered services.     Patient would like the video invitation sent by:  My Chart    Mode of Communication:  Video Conference via [employer]well    As the provider I attest to compliance with applicable laws and regulations related to telemedicine.    DATA  Extended Session (53+ minutes): No  Interactive Complexity: No  Crisis: No        Progress Since Last Session (Related to Symptoms / Goals / Homework):   Symptoms: Patient is maintaining stability despite challenges in her relationship and learning one of her supports is moving away    Homework: Partially completed  Talk to Sidney about investing    Continue, from past weeks:  Look in May for couples therapist  Continue to journal before sessions  Continue to budget  Figure out when magazines are renewing and cancel      Next session- work with this part of yourself that spends the money- not wanting to miss out, as well as how you're  hurt by family and money     In the future, return to: the part that puts up walls and the part that is sensitive to rejection     Episode of Care Goals: Satisfactory progress - ACTION (Actively working towards change); Intervened by reinforcing change plan / affirming steps taken     Current / Ongoing Stressors and Concerns:   Patient is currently socially isolated. She has a conflictual relationship with her .  She is getting minimal physical activity.  She had recent eye surgery and shoulder surgery.     Treatment Objective(s) Addressed in This Session:   Patient will increase frequency of engaging her in ADLs.  Patient will track and record at least 5 pleasant exchanges with . Patient will be able to identify at least 5 positive traits about her .  Patient will reduce level of depressive and anxious features as evidenced by reduction in score on her CHAVO-7 and PHQ-9 (scores of 15 and 16 at first measurment, respectively).     Intervention:   Person-Centered Therapy, Solution Focused Therapy, Internal Family Systems Therapy: Talked about increase in challenges in her relationship with her . Identified what this is bringing up in her and coping strategies. Looked at the changes in her support system and how she will cope with this.      The following assessments were completed by patient for this visit:  PHQ9:   PHQ-9 SCORE 2/10/2022 2/15/2022 2/22/2022 2/22/2022 3/2/2022 3/15/2022 3/18/2022   PHQ-9 Total Score MyChart 18 (Moderately severe depression) 15 (Moderately severe depression) 17 (Moderately severe depression) 14 (Moderate depression) 13 (Moderate depression) 15 (Moderately severe depression) 11 (Moderate depression)   PHQ-9 Total Score 18 15 14 17 13 15 11   Some encounter information is confidential and restricted. Go to Review Flowsheets activity to see all data.     GAD2:   CHAVO-2 2/15/2022 2/15/2022 3/2/2022 3/2/2022 3/15/2022 3/15/2022 3/15/2022   Feeling nervous, anxious,  or on edge 3 3 3 3 3 3 3   Not being able to stop or control worrying 3 3 3 3 2 2 2   CHAVO-2 Total Score 6 6 6 6 5 5 5     PROMIS 10-Global Health (only subscores and total score):   PROMIS-10 Scores Only 12/14/2021 12/14/2021 3/15/2022 3/15/2022 3/15/2022   Global Mental Health Score 6 6 6 6 6   Global Physical Health Score 9 9 9 9 9   PROMIS TOTAL - SUBSCORES 15 15 15 15 15         ASSESSMENT: Current Emotional / Mental Status (status of significant symptoms):   Risk status (Self / Other harm or suicidal ideation)   Patient denies current fears or concerns for personal safety.   Patient denies current or recent suicidal ideation or behaviors.   Patient denies current or recent homicidal ideation or behaviors.   Patient denies current or recent self injurious behavior or ideation.   Patient denies other safety concerns.   Patient reports there has been no change in risk factors since their last session.     Patient reports there has been no change in protective factors since their last session.     A safety and risk management plan has been developed including: Patient has no change in safety concerns. Committed to safety and agreed to follow previously developed safety plan..  Patient consented to co-developed safety plan.  Safety and risk management plan was completed.  Patient agreed to use safety plan should any safety concerns arise.  A copy was given to the patient.     Appearance:   Appropriate    Eye Contact:   Good    Psychomotor Behavior: Normal    Attitude:   Cooperative    Orientation:   All   Speech    Rate / Production: Normal/ Responsive    Volume:  Normal    Mood:    Normal   Affect:    Appropriate    Thought Content:  Clear    Thought Form:  Coherent    Insight:    Good      Medication Review:   No changes to current psychiatric medication(s)     Medication Compliance:   Yes     Changes in Health Issues:   None reported     Chemical Use Review:   Substance Use: Chemical use reviewed, no active  concerns identified Nothing used since 2021.     Tobacco Use: No current tobacco use.      Diagnosis:  1. Bipolar 2 disorder (H)    2. Generalized anxiety disorder    3. Trauma and stressor-related disorder      Collateral Reports Completed:   Not Applicable    PLAN: (Patient Tasks / Therapist Tasks / Other)  Talk to Sidney about investing    Continue, from past weeks:  Look in May for couples therapist  Continue to journal before sessions  Continue to budget  Figure out when magazines are renewing and cancel      Next session- work with this part of yourself that spends the money- not wanting to miss out, as well as how you're hurt by family and money     In the future, return to: the part that puts up walls and the part that is sensitive to rejection      There has been demonstrated improvement in functioning while patient has been engaged in psychotherapy/psychological service- if withdrawn the patient would deteriorate and/or relapse.     MICAELA SLADE   2022                                                         ______________________________________________________________________    Individual Treatment Plan    Patient's Name: Randi Cleary  YOB: 1955    Date of Creation: 20  Date Treatment Plan Last Reviewed/Revised: 3/9/22    DSM5 Diagnoses: 296.89 Bipolar II Disorder Depressed, 300.02 (F41.1) Generalized Anxiety Disorder or Adjustment Disorders  309.89 (F43.8) Other Specified Trauma and Stressor Related Disorder  Psychosocial / Contextual Factors: Patient's entire family of origin has , she now has a sister-in-law and  as support.  Relationship with  is conflictual. She is recovering from surgeries  PROMIS (reviewed every 90 days): PROMIS-10 Scores               Referral / Collaboration:  Referral to another professional/service is not indicated at this time..    Anticipated number of session for this episode of care: 50  Anticipation frequency  "of session: Biweekly  Anticipated Duration of each session: 53 or more minutes due to intensity of trauma symptoms  Treatment plan will be reviewed in 90 days or when goals have been changed.   There has been demonstrated improvement in functioning while patient has been engaged in psychotherapy/psychological service- if withdrawn the patient would deteriorate and/or relapse.       MeasurableTreatment Goal(s) related to diagnosis / functional impairment(s)  Goal 1: Patient will \"jumpstart, getting going with the things I need to be doing around the house as far as picking up, doing things, trying to do something every day.  Also to lessen the animosity between me and my .\"    I will know I've met my goal when my shoulders are fixed and I can see.      Objective #A (Patient Action)    Patient will increase frequency of engaging her in ADLs.  Status: Continued - Date(s): 12/10/21, 3/9/22    Intervention(s)  Therapist will engage patient in CBT, specifically behavioral activation.    Objective #B  Patient will  track and record at least 5 pleasant exchanges with . Patient will be able to identify at least 5 positive traits about her  and how he relates to her.  Status: Continued - Date(s): 12/10/21, 3/9/22    Intervention(s)  Therapist will teach assertiveness skills and assign homework related to relationship interactions.    Objective #C  Patient will reduce level of depressive and anxious features as evidenced by reduction in score on her CHAVO-7 and PHQ-9 (scores of 15 and 16 at first measurment, respectively).  Status: Continued - Date(s): 12/10/21, 3/9/22    Intervention(s)  Therapist will engage patient in person-centered therapy and CBT.    Patient has reviewed and agreed to the above plan.      MICAELA SLADE  March 9, 2022                                                   Randi Cleary          SAFETY PLAN:  Step 1: Warning signs / cues (Thoughts, images, mood, situation, behavior) " "that a crisis may be developing:  ? Thoughts: \"I don't want to continue\" \"I am unwanted\"  ? Images: none  ? Thinking Processes: ruminating  ? Mood: anger  ? Behaviors: isolating/withdrawing , can't stop crying, not taking care of myself and not taking care of my responsibilities  ? Situations: small triggers, such as not being able to find something, or dropping something   Step 2: Coping strategies - Things I can do to take my mind off of my problems without contacting another person (relaxation technique, physical activity):  ? Distress Tolerance Strategies:  arts and crafts: drawing, play with my pet , listen to positive and upbeat music: any, change body temperature (ice pack/cold water)  and paced breathing/progressive muscle relaxation  ? Physical Activities: go for a walk, deep breathing and stretching   ? Focus on helpful thoughts:  \"You've been through this before, you can get through it again.\"  Step 3: People and social settings that provide distraction:                 Name: Carmen                            Name: Darien                           Name: Aleida       ? pool, shopping, Carmen's house, [employer]       Step 4: Remind myself of people and things that are important to me and worth living for:  Clifford Little Donna, post-COVID world, options of what could be in your future        Step 5: When I am in crisis, I can ask these people to help me use my safety plan:                 Name: Sidney  Step 6: Making the environment safe:   ? go to sleep/daydream  Step 7: Professionals or agencies I can contact during a crisis:  ? [facility] Daytime Number: [phone]  ? Suicide Prevention Lifeline: 1-667-611-GVZD (1480)  ? Crisis Text Line Service (available 24 hours a day, 7 days a week): Text [place] to 666444    Local Crisis Services: [facility] Crisis: [phone]     Call 911 or go to my nearest emergency department.       I helped develop this safety plan and agree to use it when needed. "  I have been given a copy of this plan.       Client signature _________________________________________________________________  Today s date:  11/24/2020  Adapted from Safety Plan Template 2008 Randi Poole and Robby Barba is reprinted with the express permission of the authors.  No portion of the Safety Plan Template may be reproduced without the express, written permission.  You can contact the authors at [email or web address] or [email or web address].

## 2022-03-23 ENCOUNTER — TELEPHONE (OUTPATIENT)
Dept: INTERNAL MEDICINE | Facility: CLINIC | Age: 67
End: 2022-03-23
Payer: MEDICARE

## 2022-03-23 ENCOUNTER — MEDICAL CORRESPONDENCE (OUTPATIENT)
Dept: NEUROSURGERY | Facility: CLINIC | Age: 67
End: 2022-03-23
Payer: MEDICARE

## 2022-03-23 NOTE — TELEPHONE ENCOUNTER
Spoke with Winnie to inform her that we scheduled an ENT appt / [facility] Friday March 25 at 1:40 pm. [facility] [place] [address] ph: [phone]. She accepted appt time and will go to appt. She is to see Dr. Diaz

## 2022-03-24 ENCOUNTER — VIRTUAL VISIT (OUTPATIENT)
Dept: PSYCHOLOGY | Facility: CLINIC | Age: 67
End: 2022-03-24
Payer: MEDICARE

## 2022-03-24 DIAGNOSIS — F41.1 GENERALIZED ANXIETY DISORDER: ICD-10-CM

## 2022-03-24 DIAGNOSIS — F31.81 BIPOLAR 2 DISORDER (H): Primary | ICD-10-CM

## 2022-03-24 DIAGNOSIS — F43.9 TRAUMA AND STRESSOR-RELATED DISORDER: ICD-10-CM

## 2022-03-24 PROCEDURE — 90834 PSYTX W PT 45 MINUTES: CPT | Mod: 95 | Performed by: SOCIAL WORKER

## 2022-03-24 ASSESSMENT — ANXIETY QUESTIONNAIRES
7. FEELING AFRAID AS IF SOMETHING AWFUL MIGHT HAPPEN: SEVERAL DAYS
7. FEELING AFRAID AS IF SOMETHING AWFUL MIGHT HAPPEN: SEVERAL DAYS
GAD7 TOTAL SCORE: 11
1. FEELING NERVOUS, ANXIOUS, OR ON EDGE: MORE THAN HALF THE DAYS
6. BECOMING EASILY ANNOYED OR IRRITABLE: NEARLY EVERY DAY
GAD7 TOTAL SCORE: 11
4. TROUBLE RELAXING: MORE THAN HALF THE DAYS
GAD7 TOTAL SCORE: 11
2. NOT BEING ABLE TO STOP OR CONTROL WORRYING: SEVERAL DAYS
5. BEING SO RESTLESS THAT IT IS HARD TO SIT STILL: SEVERAL DAYS
3. WORRYING TOO MUCH ABOUT DIFFERENT THINGS: SEVERAL DAYS

## 2022-03-24 ASSESSMENT — PATIENT HEALTH QUESTIONNAIRE - PHQ9
10. IF YOU CHECKED OFF ANY PROBLEMS, HOW DIFFICULT HAVE THESE PROBLEMS MADE IT FOR YOU TO DO YOUR WORK, TAKE CARE OF THINGS AT HOME, OR GET ALONG WITH OTHER PEOPLE: VERY DIFFICULT
SUM OF ALL RESPONSES TO PHQ QUESTIONS 1-9: 12
SUM OF ALL RESPONSES TO PHQ QUESTIONS 1-9: 12

## 2022-03-24 NOTE — PROGRESS NOTES
[facility]                                     Progress Note    Patient Name: Randi Cleary  Date: 3/24/22         Service Type: Individual     Session Start Time: 1:02 PM  Session End Time: 1:51 PM     Session Length: 49 minutes    Session #: 96    Attendees: Client attended alone    Service Modality:  Video Visit:      Provider verified identity through the following two step process.  Patient provided:  Patient is known previously to provider    Telemedicine Visit: The patient's condition can be safely assessed and treated via synchronous audio and visual telemedicine encounter.      Reason for Telemedicine Visit: Services only offered telehealth    Originating Site (Patient Location): Patient's home    Distant Site (Provider Location): Provider Remote Setting- Home Office    Consent:  The patient/guardian has verbally consented to: the potential risks and benefits of telemedicine (video visit) versus in person care; bill my insurance or make self-payment for services provided; and responsibility for payment of non-covered services.     Patient would like the video invitation sent by:  My Chart    Mode of Communication:  Video Conference via [employer]    As the provider I attest to compliance with applicable laws and regulations related to telemedicine.    DATA  Extended Session (53+ minutes): No  Interactive Complexity: No  Crisis: No        Progress Since Last Session (Related to Symptoms / Goals / Homework):   Symptoms: Patient is finding some stability despite her health challenges    Homework: Achieved / completed to satisfaction  Talk to Sidney about investing -done    Continue, from past weeks:  Look in May for couples therapist  Continue to journal before sessions  Continue to budget  Figure out when magazines are renewing and cancel      Next session- work with this part of yourself that spends the money- not wanting to miss out, as well as how you're hurt by family and money     In  the future, return to: the part that puts up walls and the part that is sensitive to rejection       Episode of Care Goals: Satisfactory progress - ACTION (Actively working towards change); Intervened by reinforcing change plan / affirming steps taken     Current / Ongoing Stressors and Concerns:   Patient is currently socially isolated. She has a conflictual relationship with her .  She is getting minimal physical activity.  She had recent eye surgery and shoulder surgery.     Treatment Objective(s) Addressed in This Session:   Patient will increase frequency of engaging her in ADLs.  Patient will track and record at least 5 pleasant exchanges with . Patient will be able to identify at least 5 positive traits about her .  Patient will reduce level of depressive and anxious features as evidenced by reduction in score on her CHAVO-7 and PHQ-9 (scores of 15 and 16 at first measurment, respectively).     Intervention:   Person-Centered Therapy, Solution Focused Therapy, Supportive Therapy: Processed patient's trip to the emergency room. Talked about goals, agreeing she would focus on her desk to help reduce and manage clutter. Processed challenges in her life and provided supportive therapy.      The following assessments were completed by patient for this visit:  PHQ9:   PHQ-9 SCORE 2/15/2022 2/22/2022 2/22/2022 3/2/2022 3/15/2022 3/18/2022 3/24/2022   PHQ-9 Total Score MyChart 15 (Moderately severe depression) 17 (Moderately severe depression) 14 (Moderate depression) 13 (Moderate depression) 15 (Moderately severe depression) 11 (Moderate depression) 12 (Moderate depression)   PHQ-9 Total Score 15 14 17 13 15 11 12   Some encounter information is confidential and restricted. Go to Review Flowsheets activity to see all data.     GAD2:   CHAVO-2 2/15/2022 3/2/2022 3/2/2022 3/15/2022 3/15/2022 3/15/2022 3/24/2022   Feeling nervous, anxious, or on edge 3 3 3 3 3 3 2   Not being able to stop or control  worrying 3 3 3 2 2 2 1   CHAVO-2 Total Score 6 6 6 5 5 5 3     PROMIS 10-Global Health (only subscores and total score):   PROMIS-10 Scores Only 12/14/2021 12/14/2021 3/15/2022 3/15/2022 3/15/2022 3/24/2022   Global Mental Health Score 6 6 6 6 6 8   Global Physical Health Score 9 9 9 9 9 8   PROMIS TOTAL - SUBSCORES 15 15 15 15 15 16         ASSESSMENT: Current Emotional / Mental Status (status of significant symptoms):   Risk status (Self / Other harm or suicidal ideation)   Patient denies current fears or concerns for personal safety.   Patient denies current or recent suicidal ideation or behaviors.   Patient denies current or recent homicidal ideation or behaviors.   Patient denies current or recent self injurious behavior or ideation.   Patient denies other safety concerns.   Patient reports there has been no change in risk factors since their last session.     Patient reports there has been no change in protective factors since their last session.     A safety and risk management plan has been developed including: Patient has no change in safety concerns. Committed to safety and agreed to follow previously developed safety plan..  Patient consented to co-developed safety plan.  Safety and risk management plan was completed.  Patient agreed to use safety plan should any safety concerns arise.  A copy was given to the patient.     Appearance:   Appropriate    Eye Contact:   Good    Psychomotor Behavior: Normal    Attitude:   Cooperative    Orientation:   All   Speech    Rate / Production: Normal/ Responsive    Volume:  Normal    Mood:    Normal   Affect:    Appropriate    Thought Content:  Clear    Thought Form:  Coherent    Insight:    Good      Medication Review:   No changes to current psychiatric medication(s)     Medication Compliance:   Yes     Changes in Health Issues:   Yes: facial swelling     Chemical Use Review:   Substance Use: Chemical use reviewed, no active concerns identified Nothing used since  2021.     Tobacco Use: No current tobacco use.      Diagnosis:  1. Bipolar 2 disorder (H)    2. Generalized anxiety disorder    3. Trauma and stressor-related disorder      Collateral Reports Completed:   Not Applicable    PLAN: (Patient Tasks / Therapist Tasks / Other)  Discuss big stressor next time, which keeps being put on the back burner    Continue, from past weeks:  Look in May for couples therapist  Continue to journal before sessions  Continue to budget  Figure out when magazines are renewing and cancel      In the future- work with this part of yourself that spends the money- not wanting to miss out, as well as how you're hurt by family and money     In the future, return to: the part that puts up walls and the part that is sensitive to rejection      There has been demonstrated improvement in functioning while patient has been engaged in psychotherapy/psychological service- if withdrawn the patient would deteriorate and/or relapse.     MICAELA SLADE   2022                                                         ______________________________________________________________________    Individual Treatment Plan    Patient's Name: Randi Cleary  YOB: 1955    Date of Creation: 20  Date Treatment Plan Last Reviewed/Revised: 3/9/22    DSM5 Diagnoses: 296.89 Bipolar II Disorder Depressed, 300.02 (F41.1) Generalized Anxiety Disorder or Adjustment Disorders  309.89 (F43.8) Other Specified Trauma and Stressor Related Disorder  Psychosocial / Contextual Factors: Patient's entire family of origin has , she now has a sister-in-law and  as support.  Relationship with  is conflictual. She is recovering from surgeries  PROMIS (reviewed every 90 days): PROMIS-10 Scores  Global Mental Health Score: (P) 8  Global Physical Health Score: (P) 8   PROMIS TOTAL - SUBSCORES: (P) 16     Referral / Collaboration:  Referral to another professional/service is not  "indicated at this time..    Anticipated number of session for this episode of care: 50  Anticipation frequency of session: Biweekly  Anticipated Duration of each session: 53 or more minutes due to intensity of trauma symptoms  Treatment plan will be reviewed in 90 days or when goals have been changed.   There has been demonstrated improvement in functioning while patient has been engaged in psychotherapy/psychological service- if withdrawn the patient would deteriorate and/or relapse.       MeasurableTreatment Goal(s) related to diagnosis / functional impairment(s)  Goal 1: Patient will \"jumpstart, getting going with the things I need to be doing around the house as far as picking up, doing things, trying to do something every day.  Also to lessen the animosity between me and my .\"    I will know I've met my goal when my shoulders are fixed and I can see.      Objective #A (Patient Action)    Patient will increase frequency of engaging her in ADLs.  Status: Continued - Date(s): 12/10/21, 3/9/22    Intervention(s)  Therapist will engage patient in CBT, specifically behavioral activation.    Objective #B  Patient will  track and record at least 5 pleasant exchanges with . Patient will be able to identify at least 5 positive traits about her  and how he relates to her.  Status: Continued - Date(s): 12/10/21, 3/9/22    Intervention(s)  Therapist will teach assertiveness skills and assign homework related to relationship interactions.    Objective #C  Patient will reduce level of depressive and anxious features as evidenced by reduction in score on her CHAVO-7 and PHQ-9 (scores of 15 and 16 at first measurment, respectively).  Status: Continued - Date(s): 12/10/21, 3/9/22    Intervention(s)  Therapist will engage patient in person-centered therapy and CBT.    Patient has reviewed and agreed to the above plan.      MICAELA SLADE  March 9, " "2022                                                   Randi Cleary          SAFETY PLAN:  Step 1: Warning signs / cues (Thoughts, images, mood, situation, behavior) that a crisis may be developing:  ? Thoughts: \"I don't want to continue\" \"I am unwanted\"  ? Images: none  ? Thinking Processes: ruminating  ? Mood: anger  ? Behaviors: isolating/withdrawing , can't stop crying, not taking care of myself and not taking care of my responsibilities  ? Situations: small triggers, such as not being able to find something, or dropping something   Step 2: Coping strategies - Things I can do to take my mind off of my problems without contacting another person (relaxation technique, physical activity):  ? Distress Tolerance Strategies:  arts and crafts: drawing, play with my pet , listen to positive and upbeat music: any, change body temperature (ice pack/cold water)  and paced breathing/progressive muscle relaxation  ? Physical Activities: go for a walk, deep breathing and stretching   ? Focus on helpful thoughts:  \"You've been through this before, you can get through it again.\"  Step 3: People and social settings that provide distraction:                 Name: Carmen                            Name: Darien                           Name: Aleida       ? pool, shopping, Carmen's house, [employer]       Step 4: Remind myself of people and things that are important to me and worth living for:  Clifford Little Donna, post-COVID world, options of what could be in your future        Step 5: When I am in crisis, I can ask these people to help me use my safety plan:                 Name: Sidney  Step 6: Making the environment safe:   ? go to sleep/daydream  Step 7: Professionals or agencies I can contact during a crisis:  ? [facility] Daytime Number: 701-830-4395  ? Suicide Prevention Lifeline: 1-858-126-ACCM (3386)  ? Crisis Text Line Service (available 24 hours a day, 7 days a week): Text [place] to 191579    Local Crisis " Services: [facility] Crisis: [phone]     Call 911 or go to my nearest emergency department.       I helped develop this safety plan and agree to use it when needed.  I have been given a copy of this plan.       Client signature _________________________________________________________________  Today s date:  11/24/2020  Adapted from Safety Plan Template 2008 Randi Poole and Robby Barba is reprinted with the express permission of the authors.  No portion of the Safety Plan Template may be reproduced without the express, written permission.  You can contact the authors at [email or web address] or [email or web address].

## 2022-03-25 ENCOUNTER — TRANSFERRED RECORDS (OUTPATIENT)
Dept: HEALTH INFORMATION MANAGEMENT | Facility: CLINIC | Age: 67
End: 2022-03-25
Payer: MEDICARE

## 2022-03-25 ASSESSMENT — ANXIETY QUESTIONNAIRES: GAD7 TOTAL SCORE: 11

## 2022-03-25 ASSESSMENT — PATIENT HEALTH QUESTIONNAIRE - PHQ9: SUM OF ALL RESPONSES TO PHQ QUESTIONS 1-9: 12

## 2022-03-30 ENCOUNTER — VIRTUAL VISIT (OUTPATIENT)
Dept: PSYCHOLOGY | Facility: CLINIC | Age: 67
End: 2022-03-30
Payer: MEDICARE

## 2022-03-30 DIAGNOSIS — F31.81 BIPOLAR 2 DISORDER (H): Primary | ICD-10-CM

## 2022-03-30 DIAGNOSIS — F41.1 GENERALIZED ANXIETY DISORDER: ICD-10-CM

## 2022-03-30 DIAGNOSIS — F43.9 TRAUMA AND STRESSOR-RELATED DISORDER: ICD-10-CM

## 2022-03-30 PROCEDURE — 90834 PSYTX W PT 45 MINUTES: CPT | Mod: 95 | Performed by: SOCIAL WORKER

## 2022-03-30 NOTE — PROGRESS NOTES
"[facility]                                     Progress Note    Patient Name: Randi Cleary  Date: 3/30/22         Service Type: Individual     Session Start Time: 3:00 PM  Session End Time: 3:48 PM     Session Length: 48 minutes    Session #: 97    Attendees: Client attended alone    Service Modality:  Video Visit:      Provider verified identity through the following two step process.  Patient provided:  Patient is known previously to provider    Telemedicine Visit: The patient's condition can be safely assessed and treated via synchronous audio and visual telemedicine encounter.      Reason for Telemedicine Visit: Services only offered telehealth    Originating Site (Patient Location): Patient's home    Distant Site (Provider Location): Provider Remote Setting- Home Office    Consent:  The patient/guardian has verbally consented to: the potential risks and benefits of telemedicine (video visit) versus in person care; bill my insurance or make self-payment for services provided; and responsibility for payment of non-covered services.     Patient would like the video invitation sent by:  My Chart    Mode of Communication:  Video Conference via [employer]well    As the provider I attest to compliance with applicable laws and regulations related to telemedicine.    DATA  Extended Session (53+ minutes): No  Interactive Complexity: No  Crisis: No        Progress Since Last Session (Related to Symptoms / Goals / Homework):   Symptoms: Improving \"I had an ok week.\"    Homework: Progress made  Discuss big stressor next time, which keeps being put on the back burner -done    Continue, from past weeks:  Look in May for couples therapist  Continue to journal before sessions -not done  Continue to budget -done  Figure out when magazines are renewing and cancel -done     Episode of Care Goals: Satisfactory progress - ACTION (Actively working towards change); Intervened by reinforcing change plan / affirming " steps taken     Current / Ongoing Stressors and Concerns:   Patient is currently socially isolated. She has a conflictual relationship with her .  She is getting minimal physical activity.  She had recent eye surgery and shoulder surgery.     Treatment Objective(s) Addressed in This Session:   Patient will increase frequency of engaging her in ADLs.  Patient will track and record at least 5 pleasant exchanges with . Patient will be able to identify at least 5 positive traits about her .  Patient will reduce level of depressive and anxious features as evidenced by reduction in score on her CHAVO-7 and PHQ-9 (scores of 15 and 16 at first measurment, respectively).     Intervention:   Person-Centered Therapy, Solution Focused Therapy, Supportive Therapy: Patient has cleaned up their desk and started a budget, which feels relieving to them. Discussed concerns about taxes and filling. Helped patient organize her thoughts around this. Discussed relationship and how this impacts her emotions around doing necessary tasks such as taxes and budgeting. Reviewed homework and identified successes and barriers to completion.    The following assessments were completed by patient for this visit:  PHQ9:   PHQ-9 SCORE 2/15/2022 2/22/2022 2/22/2022 3/2/2022 3/15/2022 3/18/2022 3/24/2022   PHQ-9 Total Score MyChart 15 (Moderately severe depression) 17 (Moderately severe depression) 14 (Moderate depression) 13 (Moderate depression) 15 (Moderately severe depression) 11 (Moderate depression) 12 (Moderate depression)   PHQ-9 Total Score 15 14 17 13 15 11 12   Some encounter information is confidential and restricted. Go to Review Flowsheets activity to see all data.     GAD2:   CHAVO-2 2/15/2022 3/2/2022 3/2/2022 3/15/2022 3/15/2022 3/15/2022 3/24/2022   Feeling nervous, anxious, or on edge 3 3 3 3 3 3 2   Not being able to stop or control worrying 3 3 3 2 2 2 1   CHAVO-2 Total Score 6 6 6 5 5 5 3     PROMIS 10-Global  Health (only subscores and total score):   PROMIS-10 Scores Only 12/14/2021 12/14/2021 3/15/2022 3/15/2022 3/15/2022 3/24/2022   Global Mental Health Score 6 6 6 6 6 8   Global Physical Health Score 9 9 9 9 9 8   PROMIS TOTAL - SUBSCORES 15 15 15 15 15 16         ASSESSMENT: Current Emotional / Mental Status (status of significant symptoms):   Risk status (Self / Other harm or suicidal ideation)   Patient denies current fears or concerns for personal safety.   Patient denies current or recent suicidal ideation or behaviors.   Patient denies current or recent homicidal ideation or behaviors.   Patient denies current or recent self injurious behavior or ideation.   Patient denies other safety concerns.   Patient reports there has been no change in risk factors since their last session.     Patient reports there has been no change in protective factors since their last session.     A safety and risk management plan has been developed including: Patient has no change in safety concerns. Committed to safety and agreed to follow previously developed safety plan..  Patient consented to co-developed safety plan.  Safety and risk management plan was completed.  Patient agreed to use safety plan should any safety concerns arise.  A copy was given to the patient.     Appearance:   Appropriate    Eye Contact:   Good    Psychomotor Behavior: Normal    Attitude:   Cooperative    Orientation:   All   Speech    Rate / Production: Normal/ Responsive    Volume:  Normal    Mood:    Normal   Affect:    Appropriate    Thought Content:  Clear    Thought Form:  Coherent    Insight:    Good      Medication Review:   No changes to current psychiatric medication(s)     Medication Compliance:   Yes     Changes in Health Issues:   None reported     Chemical Use Review:   Substance Use: Chemical use reviewed, no active concerns identified Nothing used since August 2021.     Tobacco Use: No current tobacco use.      Diagnosis:  1. Bipolar 2  disorder (H)    2. Generalized anxiety disorder    3. Trauma and stressor-related disorder      Collateral Reports Completed:   Not Applicable    PLAN: (Patient Tasks / Therapist Tasks / Other)  Focus on taxes  Look in May for couples therapist  Continue to journal before sessions  Continue to budget      In the future- work with this part of yourself that spends the money- not wanting to miss out, as well as how you're hurt by family and money     In the future, return to: the part that puts up walls and the part that is sensitive to rejection      There has been demonstrated improvement in functioning while patient has been engaged in psychotherapy/psychological service- if withdrawn the patient would deteriorate and/or relapse.     MICAELA SLADE   2022                                                         ______________________________________________________________________    Individual Treatment Plan    Patient's Name: Randi Cleary  YOB: 1955    Date of Creation: 20  Date Treatment Plan Last Reviewed/Revised: 3/9/22    DSM5 Diagnoses: 296.89 Bipolar II Disorder Depressed, 300.02 (F41.1) Generalized Anxiety Disorder or Adjustment Disorders  309.89 (F43.8) Other Specified Trauma and Stressor Related Disorder  Psychosocial / Contextual Factors: Patient's entire family of origin has , she now has a sister-in-law and  as support.  Relationship with  is conflictual. She is recovering from surgeries  PROMIS (reviewed every 90 days): PROMIS-10 Scores               Referral / Collaboration:  Referral to another professional/service is not indicated at this time..    Anticipated number of session for this episode of care: 50  Anticipation frequency of session: Biweekly  Anticipated Duration of each session: 53 or more minutes due to intensity of trauma symptoms  Treatment plan will be reviewed in 90 days or when goals have been changed.   There has been  "demonstrated improvement in functioning while patient has been engaged in psychotherapy/psychological service- if withdrawn the patient would deteriorate and/or relapse.       MeasurableTreatment Goal(s) related to diagnosis / functional impairment(s)  Goal 1: Patient will \"jumpstart, getting going with the things I need to be doing around the house as far as picking up, doing things, trying to do something every day.  Also to lessen the animosity between me and my .\"    I will know I've met my goal when my shoulders are fixed and I can see.      Objective #A (Patient Action)    Patient will increase frequency of engaging her in ADLs.  Status: Continued - Date(s): 12/10/21, 3/9/22    Intervention(s)  Therapist will engage patient in CBT, specifically behavioral activation.    Objective #B  Patient will  track and record at least 5 pleasant exchanges with . Patient will be able to identify at least 5 positive traits about her  and how he relates to her.  Status: Continued - Date(s): 12/10/21, 3/9/22    Intervention(s)  Therapist will teach assertiveness skills and assign homework related to relationship interactions.    Objective #C  Patient will reduce level of depressive and anxious features as evidenced by reduction in score on her CHAVO-7 and PHQ-9 (scores of 15 and 16 at first measurment, respectively).  Status: Continued - Date(s): 12/10/21, 3/9/22    Intervention(s)  Therapist will engage patient in person-centered therapy and CBT.    Patient has reviewed and agreed to the above plan.      MICAELA SLADE  March 9, 2022                                                   Randi Cleary          SAFETY PLAN:  Step 1: Warning signs / cues (Thoughts, images, mood, situation, behavior) that a crisis may be developing:  ? Thoughts: \"I don't want to continue\" \"I am unwanted\"  ? Images: none  ? Thinking Processes: ruminating  ? Mood: anger  ? Behaviors: isolating/withdrawing , can't stop " "crying, not taking care of myself and not taking care of my responsibilities  ? Situations: small triggers, such as not being able to find something, or dropping something   Step 2: Coping strategies - Things I can do to take my mind off of my problems without contacting another person (relaxation technique, physical activity):  ? Distress Tolerance Strategies:  arts and crafts: drawing, play with my pet , listen to positive and upbeat music: any, change body temperature (ice pack/cold water)  and paced breathing/progressive muscle relaxation  ? Physical Activities: go for a walk, deep breathing and stretching   ? Focus on helpful thoughts:  \"You've been through this before, you can get through it again.\"  Step 3: People and social settings that provide distraction:                 Name: Carmen                            Name: [place]                           Name: Aleida       ? pool, shopping, Carmen's house, [employer]       Step 4: Remind myself of people and things that are important to me and worth living for:  S[place], Aleida Horton, post-COVID world, options of what could be in your future        Step 5: When I am in crisis, I can ask these people to help me use my safety plan:                 Name: Sidney  Step 6: Making the environment safe:   ? go to sleep/daydream  Step 7: Professionals or agencies I can contact during a crisis:  ? [facility] Daytime Number: [phone]  ? Suicide Prevention Lifeline: 9-697-955-OUAO (5505)  ? Crisis Text Line Service (available 24 hours a day, 7 days a week): Text [place] to 298389    Local Crisis Services: [facility] Crisis: [phone]     Call 911 or go to my nearest emergency department.       I helped develop this safety plan and agree to use it when needed.  I have been given a copy of this plan.       Client signature _________________________________________________________________  Today s date:  11/24/2020  Adapted from Safety Plan Template 2008 " Randi Poole and Robby Barba is reprinted with the express permission of the authors.  No portion of the Safety Plan Template may be reproduced without the express, written permission.  You can contact the authors at [email or web address] or [email or web address].

## 2022-03-31 DIAGNOSIS — G95.20 CORD COMPRESSION (H): Chronic | ICD-10-CM

## 2022-03-31 DIAGNOSIS — M47.12 CERVICAL SPONDYLOSIS WITH MYELOPATHY: ICD-10-CM

## 2022-03-31 DIAGNOSIS — M54.12 CERVICAL RADICULOPATHY: ICD-10-CM

## 2022-03-31 RX ORDER — HYDROCODONE BITARTRATE AND ACETAMINOPHEN 5; 325 MG/1; MG/1
1 TABLET ORAL 3 TIMES DAILY PRN
Qty: 42 TABLET | Refills: 0 | Status: SHIPPED | OUTPATIENT
Start: 2022-04-02 | End: 2022-04-06

## 2022-03-31 RX ORDER — METHOCARBAMOL 500 MG/1
500 TABLET, FILM COATED ORAL 4 TIMES DAILY
Qty: 30 TABLET | Refills: 0 | Status: SHIPPED | OUTPATIENT
Start: 2022-03-31 | End: 2022-04-06

## 2022-04-01 ENCOUNTER — VIRTUAL VISIT (OUTPATIENT)
Dept: PSYCHOLOGY | Facility: CLINIC | Age: 67
End: 2022-04-01
Payer: MEDICARE

## 2022-04-01 DIAGNOSIS — F43.9 TRAUMA AND STRESSOR-RELATED DISORDER: ICD-10-CM

## 2022-04-01 DIAGNOSIS — F41.1 GENERALIZED ANXIETY DISORDER: ICD-10-CM

## 2022-04-01 DIAGNOSIS — F31.81 BIPOLAR 2 DISORDER (H): Primary | ICD-10-CM

## 2022-04-01 PROCEDURE — 90834 PSYTX W PT 45 MINUTES: CPT | Mod: 95 | Performed by: SOCIAL WORKER

## 2022-04-01 ASSESSMENT — PATIENT HEALTH QUESTIONNAIRE - PHQ9
10. IF YOU CHECKED OFF ANY PROBLEMS, HOW DIFFICULT HAVE THESE PROBLEMS MADE IT FOR YOU TO DO YOUR WORK, TAKE CARE OF THINGS AT HOME, OR GET ALONG WITH OTHER PEOPLE: SOMEWHAT DIFFICULT
SUM OF ALL RESPONSES TO PHQ QUESTIONS 1-9: 8
SUM OF ALL RESPONSES TO PHQ QUESTIONS 1-9: 8

## 2022-04-01 NOTE — PROGRESS NOTES
[facility]                                     Progress Note    Patient Name: Randi Cleary  Date: 4/1/22         Service Type: Individual     Session Start Time: 8:01 AM  Session End Time: 8:50 AM     Session Length: 49 minutes    Session #: 98    Attendees: Client attended alone    Service Modality:  Video Visit:      Provider verified identity through the following two step process.  Patient provided:  Patient is known previously to provider    Telemedicine Visit: The patient's condition can be safely assessed and treated via synchronous audio and visual telemedicine encounter.      Reason for Telemedicine Visit: Services only offered telehealth    Originating Site (Patient Location): Patient's home    Distant Site (Provider Location): Provider Remote Setting- Home Office    Consent:  The patient/guardian has verbally consented to: the potential risks and benefits of telemedicine (video visit) versus in person care; bill my insurance or make self-payment for services provided; and responsibility for payment of non-covered services.     Patient would like the video invitation sent by:  My Chart    Mode of Communication:  Video Conference via Amwell    As the provider I attest to compliance with applicable laws and regulations related to telemedicine.    DATA  Extended Session (53+ minutes): No  Interactive Complexity: No  Crisis: No        Progress Since Last Session (Related to Symptoms / Goals / Homework):   Symptoms: Patient has had several rough days    Homework: Progress made  Focus on taxes -not done  Look in May for couples therapist  Continue to journal before sessions  Continue to budget -not done     Episode of Care Goals: Satisfactory progress - ACTION (Actively working towards change); Intervened by reinforcing change plan / affirming steps taken     Current / Ongoing Stressors and Concerns:   Patient is currently socially isolated. She has a conflictual relationship with her  .  She is getting minimal physical activity.  She had recent eye surgery and shoulder surgery.     Treatment Objective(s) Addressed in This Session:   Patient will increase frequency of engaging her in ADLs.  Patient will track and record at least 5 pleasant exchanges with . Patient will be able to identify at least 5 positive traits about her .  Patient will reduce level of depressive and anxious features as evidenced by reduction in score on her CHAVO-7 and PHQ-9 (scores of 15 and 16 at first measurment, respectively).     Intervention:   Person-Centered Therapy, Solution Focused Therapy, Supportive Therapy: Patient reported she filled out the questionnaires out of distress and didn't read them, so they should not be considered valid.  Patient shared she's been struggling for the past few days, not accomplishing anything she intended to. Instead, patient has been focused at exploring home equity lines of credit or loans. Talked about not rushing decisions as this has led to challenges in the past.    Looked at supports and enjoying time with friends, with friends planning to come over tomorrow. Focussed on how to not get stuck and overwhelmed, but instead focus on the time with the friends.     The following assessments were completed by patient for this visit:  PHQ9:   PHQ-9 SCORE 2/22/2022 2/22/2022 3/2/2022 3/15/2022 3/18/2022 3/24/2022 4/1/2022   PHQ-9 Total Score MyChart 17 (Moderately severe depression) 14 (Moderate depression) 13 (Moderate depression) 15 (Moderately severe depression) 11 (Moderate depression) 12 (Moderate depression) 8 (Mild depression)   PHQ-9 Total Score 14 17 13 15 11 12 8   Some encounter information is confidential and restricted. Go to Review Flowsheets activity to see all data.     GAD2:   CHAVO-2 3/2/2022 3/2/2022 3/15/2022 3/15/2022 3/15/2022 3/24/2022 4/1/2022   Feeling nervous, anxious, or on edge 3 3 3 3 3 2 2   Not being able to stop or control worrying 3 3 2 2  2 1 1   CHAVO-2 Total Score 6 6 5 5 5 3 3     PROMIS 10-Global Health (only subscores and total score):   PROMIS-10 Scores Only 12/14/2021 12/14/2021 3/15/2022 3/15/2022 3/15/2022 3/24/2022 4/1/2022   Global Mental Health Score 6 6 6 6 6 8 12   Global Physical Health Score 9 9 9 9 9 8 11   PROMIS TOTAL - SUBSCORES 15 15 15 15 15 16 23         ASSESSMENT: Current Emotional / Mental Status (status of significant symptoms):   Risk status (Self / Other harm or suicidal ideation)   Patient denies current fears or concerns for personal safety.   Patient denies current or recent suicidal ideation or behaviors.   Patient denies current or recent homicidal ideation or behaviors.   Patient denies current or recent self injurious behavior or ideation.   Patient denies other safety concerns.   Patient reports there has been no change in risk factors since their last session.     Patient reports there has been no change in protective factors since their last session.     A safety and risk management plan has been developed including: Patient has no change in safety concerns. Committed to safety and agreed to follow previously developed safety plan..  Patient consented to co-developed safety plan.  Safety and risk management plan was completed.  Patient agreed to use safety plan should any safety concerns arise.  A copy was given to the patient.     Appearance:   Appropriate    Eye Contact:   Good    Psychomotor Behavior: Normal    Attitude:   Cooperative    Orientation:   All   Speech    Rate / Production: Normal/ Responsive    Volume:  Normal    Mood:    Normal   Affect:    Appropriate    Thought Content:  Clear    Thought Form:  Coherent    Insight:    Good      Medication Review:   No changes to current psychiatric medication(s)     Medication Compliance:   Yes     Changes in Health Issues:   None reported     Chemical Use Review:   Substance Use: Chemical use reviewed, no active concerns identified Nothing used since August  .     Tobacco Use: No current tobacco use.      Diagnosis:  1. Bipolar 2 disorder (H)    2. Generalized anxiety disorder    3. Trauma and stressor-related disorder      Collateral Reports Completed:   Not Applicable    PLAN: (Patient Tasks / Therapist Tasks / Other)  Spend time with friends  Work on finances (taxes, budgeting)    Look in May for couples therapist  Continue to journal before sessions      In the future- work with this part of yourself that spends the money- not wanting to miss out, as well as how you're hurt by family and money     In the future, return to: the part that puts up walls and the part that is sensitive to rejection      There has been demonstrated improvement in functioning while patient has been engaged in psychotherapy/psychological service- if withdrawn the patient would deteriorate and/or relapse.     MICAELA SLADE   2022                                                         ______________________________________________________________________    Individual Treatment Plan    Patient's Name: Randi Cleary  YOB: 1955    Date of Creation: 20  Date Treatment Plan Last Reviewed/Revised: 3/9/22    DSM5 Diagnoses: 296.89 Bipolar II Disorder Depressed, 300.02 (F41.1) Generalized Anxiety Disorder or Adjustment Disorders  309.89 (F43.8) Other Specified Trauma and Stressor Related Disorder  Psychosocial / Contextual Factors: Patient's entire family of origin has , she now has a sister-in-law and  as support.  Relationship with  is conflictual. She is recovering from surgeries  PROMIS (reviewed every 90 days): PROMIS-10 Scores  Global Mental Health Score: (P) 12  Global Physical Health Score: (P) 11   PROMIS TOTAL - SUBSCORES: (P) 23     Referral / Collaboration:  Referral to another professional/service is not indicated at this time..    Anticipated number of session for this episode of care: 50  Anticipation frequency of session:  "Biweekly  Anticipated Duration of each session: 53 or more minutes due to intensity of trauma symptoms  Treatment plan will be reviewed in 90 days or when goals have been changed.   There has been demonstrated improvement in functioning while patient has been engaged in psychotherapy/psychological service- if withdrawn the patient would deteriorate and/or relapse.       MeasurableTreatment Goal(s) related to diagnosis / functional impairment(s)  Goal 1: Patient will \"jumpstart, getting going with the things I need to be doing around the house as far as picking up, doing things, trying to do something every day.  Also to lessen the animosity between me and my .\"    I will know I've met my goal when my shoulders are fixed and I can see.      Objective #A (Patient Action)    Patient will increase frequency of engaging her in ADLs.  Status: Continued - Date(s): 12/10/21, 3/9/22    Intervention(s)  Therapist will engage patient in CBT, specifically behavioral activation.    Objective #B  Patient will  track and record at least 5 pleasant exchanges with . Patient will be able to identify at least 5 positive traits about her  and how he relates to her.  Status: Continued - Date(s): 12/10/21, 3/9/22    Intervention(s)  Therapist will teach assertiveness skills and assign homework related to relationship interactions.    Objective #C  Patient will reduce level of depressive and anxious features as evidenced by reduction in score on her CHAVO-7 and PHQ-9 (scores of 15 and 16 at first measurment, respectively).  Status: Continued - Date(s): 12/10/21, 3/9/22    Intervention(s)  Therapist will engage patient in person-centered therapy and CBT.    Patient has reviewed and agreed to the above plan.      MICAELA SLADE  March 9, 2022                                                   Randi Cleary          SAFETY PLAN:  Step 1: Warning signs / cues (Thoughts, images, mood, situation, behavior) that a " "crisis may be developing:  ? Thoughts: \"I don't want to continue\" \"I am unwanted\"  ? Images: none  ? Thinking Processes: ruminating  ? Mood: anger  ? Behaviors: isolating/withdrawing , can't stop crying, not taking care of myself and not taking care of my responsibilities  ? Situations: small triggers, such as not being able to find something, or dropping something   Step 2: Coping strategies - Things I can do to take my mind off of my problems without contacting another person (relaxation technique, physical activity):  ? Distress Tolerance Strategies:  arts and crafts: drawing, play with my pet , listen to positive and upbeat music: any, change body temperature (ice pack/cold water)  and paced breathing/progressive muscle relaxation  ? Physical Activities: go for a walk, deep breathing and stretching   ? Focus on helpful thoughts:  \"You've been through this before, you can get through it again.\"  Step 3: People and social settings that provide distraction:                 Name: Carmen                            Name: Darien                           Name: Aleida       ? pool, shopping, Carmen's house, [employer]       Step 4: Remind myself of people and things that are important to me and worth living for:  Clifford Little Donna, post-COVID world, options of what could be in your future        Step 5: When I am in crisis, I can ask these people to help me use my safety plan:                 Name: Sidney  Step 6: Making the environment safe:   ? go to sleep/daydream  Step 7: Professionals or agencies I can contact during a crisis:  ? [facility] Daytime Number: [phone]  ? Suicide Prevention Lifeline: 0-850-379-QSTL (2741)  ? Crisis Text Line Service (available 24 hours a day, 7 days a week): Text [place] to 087703    Local Crisis Services: [facility] Crisis: [phone]     Call 911 or go to my nearest emergency department.       I helped develop this safety plan and agree to use it when needed.  I " have been given a copy of this plan.       Client signature _________________________________________________________________  Today s date:  11/24/2020  Adapted from Safety Plan Template 2008 Randi Poole and Robby Barba is reprinted with the express permission of the authors.  No portion of the Safety Plan Template may be reproduced without the express, written permission.  You can contact the authors at [email or web address] or [email or web address].

## 2022-04-02 ASSESSMENT — PATIENT HEALTH QUESTIONNAIRE - PHQ9: SUM OF ALL RESPONSES TO PHQ QUESTIONS 1-9: 8

## 2022-04-05 ENCOUNTER — OFFICE VISIT (OUTPATIENT)
Dept: NEUROSURGERY | Facility: CLINIC | Age: 67
End: 2022-04-05
Payer: MEDICARE

## 2022-04-05 ENCOUNTER — VIRTUAL VISIT (OUTPATIENT)
Dept: PSYCHOLOGY | Facility: CLINIC | Age: 67
End: 2022-04-05
Payer: MEDICARE

## 2022-04-05 VITALS
BODY MASS INDEX: 36.8 KG/M2 | OXYGEN SATURATION: 94 % | DIASTOLIC BLOOD PRESSURE: 72 MMHG | SYSTOLIC BLOOD PRESSURE: 138 MMHG | HEIGHT: 66 IN | HEART RATE: 95 BPM | WEIGHT: 229 LBS

## 2022-04-05 DIAGNOSIS — F31.81 BIPOLAR 2 DISORDER (H): Primary | ICD-10-CM

## 2022-04-05 DIAGNOSIS — F41.1 GENERALIZED ANXIETY DISORDER: ICD-10-CM

## 2022-04-05 DIAGNOSIS — M48.02 SPINAL STENOSIS IN CERVICAL REGION: ICD-10-CM

## 2022-04-05 DIAGNOSIS — R20.0 NUMBNESS OF FINGERS: Primary | ICD-10-CM

## 2022-04-05 DIAGNOSIS — G95.20 CORD COMPRESSION (H): ICD-10-CM

## 2022-04-05 DIAGNOSIS — F43.9 TRAUMA AND STRESSOR-RELATED DISORDER: ICD-10-CM

## 2022-04-05 PROCEDURE — 90834 PSYTX W PT 45 MINUTES: CPT | Mod: 95 | Performed by: SOCIAL WORKER

## 2022-04-05 PROCEDURE — 99024 POSTOP FOLLOW-UP VISIT: CPT | Performed by: NURSE PRACTITIONER

## 2022-04-05 NOTE — PROGRESS NOTES
Neurosurgery Follow UP  April 5, 2022    A/P: Winnie Cleary is a 66 year old S/P left open door laminoplasty C3-6, foraminotomies left C4-7 for cervical myelopathy, radiculopathy, stenosis with Dr Gregory 12/21/2021.     Today Winnie feels OK unless she is doing things she should not be doing. Flared up this past weekend after she hosted friends for dinner. Occipital headaches when she flares up. Stiff and tender. Massage helps a lot. PT has helped. She has had 8 sessions and goes again tomorrow. Has not been swimming yet but looks forward to it when the weather is better.     Right fingers numb thumb, 2nd and 3rd. Had CTR 2021 [place]. Got better and now seems bothersome again. Dropping things. Worse with reading, driving. Numbness left arm much improved.     Methocarbamol and hydrocodone (maybe two per day) from Dr Dubois.     PLAN:   1. MRI wrist ordered - they will call you to schedule and then we will discuss on the phone options. Call me 1-3 days after MRI if I have not called you    2. Continue PT     3. Return in 8 weeks to follow up on symptoms. Sooner if you need to or if feeling great, may cancel this appointment.     4. Call Dr Dubois about scripts or the pharmacy    5. No restrictions in terms of activity or weight lifting amount     HPI: Randi Cleary is a 66 year old female who presents with imbalance, numbness and tingling in her hands, fine motor difficulties, left shoulder pain, radiating left arm pain and numbness. She complains of both myelopathy and radiculopathy symptoms. MRI of her cervical spine sows multi-level cervical stenosis including moderate at C4-5, and moderate to severe at C6-7 and mild spinal canal stenosis at C5-6 and C3-4 with multi-level severe foraminal narrowing at these levels. Also has retrolisthesis of C4-5 and anterolisthesis of C7-T1. XR does not show any significant instability. CTR [place] 2021. Reports Hx: taking oxycodone for 20 years, medical cannabis. Sober  "since august 2021. Lithium orotate and oxarbazepine in the past. Bipolar spectrum disorder. [facility]g done with [facility]    Physical Exam  /72   Pulse 95   Ht 5' 6\" (1.676 m)   Wt 229 lb (103.9 kg)   SpO2 94%   BMI 36.96 kg/m      General: alert, oriented. Patient concerned about medical claims and insurance. Encouraged patient to call her insurance and or the PCP office where she had labs done that she is in question of.     Motor: normal bulk and tone     Strength:   biceps 5/5 right, 5/5 left   Triceps 5/5 right, 5/5 left   Deltoid 5/5 right, 5/5 left  Hand grasp 4/5 right, 4/5 left    Hip flexors 5/5 right, 5/5 left   Quadriceps: 5/5 right, 5/5 left   Ankle dorsiflexion: 5/5 right, 5/5 left   Extensor hallicus longus: 5/5 right, 5/5 left   Ankle plantar flexion : 5/5 right, 5/5 left     Sensation: intact to temperature and touch     Reflexes: no valentino     Coordination: steady     Imaging: none today     Zandra Davidson, RIP-CNP  [facility] Neurosurgery  O: [phone]        "

## 2022-04-05 NOTE — PATIENT INSTRUCTIONS
1. MRI wrist ordered - they will call you to schedule and then we will discuss on the phone options. Call me 1-3 days after MRI if I have not called you    2. Continue PT     3. Return in 8 weeks to follow up on symptoms. Sooner if you need to     4. Call Dr Dubois about scripts or the pharmacy    5. No restrictions in terms of activity or weight lifting amount

## 2022-04-05 NOTE — LETTER
4/5/2022         RE: Randi Cleary  [address] [facility] [address]        Dear Colleague,    Thank you for referring your patient, Randi Cleary, to the [facility]. Please see a copy of my visit note below.    Neurosurgery Follow UP  April 5, 2022    A/P: Winnie Cleary is a 66 year old S/P left open door laminoplasty C3-6, foraminotomies left C4-7 for cervical myelopathy, radiculopathy, stenosis with Dr Gregory 12/21/2021.     Today Winnie feels OK unless she is doing things she should not be doing. Flared up this past weekend after she hosted friends for dinner. Occipital headaches when she flares up. Stiff and tender. Massage helps a lot. PT has helped. She has had 8 sessions and goes again tomorrow. Has not been swimming yet but looks forward to it when the weather is better.     Right fingers numb thumb, 2nd and 3rd. Had CTR 2021 [place]. Got better and now seems bothersome again. Dropping things. Worse with reading, driving. Numbness left arm much improved.     Methocarbamol and hydrocodone (maybe two per day) from Dr Dubois.     PLAN:   1. MRI wrist ordered - they will call you to schedule and then we will discuss on the phone options. Call me 1-3 days after MRI if I have not called you    2. Continue PT     3. Return in 8 weeks to follow up on symptoms. Sooner if you need to or if feeling great, may cancel this appointment.     4. Call Dr Dubois about scripts or the pharmacy    5. No restrictions in terms of activity or weight lifting amount     HPI: Randi Cleary is a 66 year old female who presents with imbalance, numbness and tingling in her hands, fine motor difficulties, left shoulder pain, radiating left arm pain and numbness. She complains of both myelopathy and radiculopathy symptoms. MRI of her cervical spine sows multi-level cervical stenosis including moderate at C4-5, and moderate to severe at C6-7 and mild spinal canal  "stenosis at C5-6 and C3-4 with multi-level severe foraminal narrowing at these levels. Also has retrolisthesis of C4-5 and anterolisthesis of C7-T1. XR does not show any significant instability. CTR [place] 2021. Reports Hx: taking oxycodone for 20 years, medical cannabis. Sober since august 2021. Lithium orotate and oxarbazepine in the past. Bipolar spectrum disorder. [facility]g done with [facility]??    Physical Exam  /72   Pulse 95   Ht 5' 6\" (1.676 m)   Wt 229 lb (103.9 kg)   SpO2 94%   BMI 36.96 kg/m      General: alert, oriented. Patient concerned about medical claims and insurance. Encouraged patient to call her insurance and or the PCP office where she had labs done that she is in question of.     Motor: normal bulk and tone     Strength:   biceps 5/5 right, 5/5 left   Triceps 5/5 right, 5/5 left   Deltoid 5/5 right, 5/5 left  Hand grasp 4/5 right, 4/5 left    Hip flexors 5/5 right, 5/5 left   Quadriceps: 5/5 right, 5/5 left   Ankle dorsiflexion: 5/5 right, 5/5 left   Extensor hallicus longus: 5/5 right, 5/5 left   Ankle plantar flexion : 5/5 right, 5/5 left     Sensation: intact to temperature and touch     Reflexes: no valentino     Coordination: steady     Imaging: none today     AZUCENA Max  [facility] Neurosurgery  O: [phone]            Again, thank you for allowing me to participate in the care of your patient.        Sincerely,        DION Thompson CNP    "

## 2022-04-05 NOTE — PROGRESS NOTES
[facility]                                     Progress Note    Patient Name: Randi Cleary  Date: 4/5/22         Service Type: Individual     Session Start Time: 2:02 PM  Session End Time: 2:53 PM     Session Length: 51 minutes    Session #: 99    Attendees: Client attended alone    Service Modality:  Video Visit:      Provider verified identity through the following two step process.  Patient provided:  Patient is known previously to provider    Telemedicine Visit: The patient's condition can be safely assessed and treated via synchronous audio and visual telemedicine encounter.      Reason for Telemedicine Visit: Services only offered telehealth    Originating Site (Patient Location): Patient's home    Distant Site (Provider Location): Provider Remote Setting- Home Office    Consent:  The patient/guardian has verbally consented to: the potential risks and benefits of telemedicine (video visit) versus in person care; bill my insurance or make self-payment for services provided; and responsibility for payment of non-covered services.     Patient would like the video invitation sent by:  My Chart    Mode of Communication:  Video Conference via [employer]well    As the provider I attest to compliance with applicable laws and regulations related to telemedicine.    DATA  Extended Session (53+ minutes): No  Interactive Complexity: No  Crisis: No        Progress Since Last Session (Related to Symptoms / Goals / Homework):   Symptoms: Patient has some stability right now    Homework: Progress made  Spend time with friends -done  Work on finances (taxes, budgeting) -called about taxes, started on budgeting    Look in May for couples therapist  Continue to journal before sessions     Episode of Care Goals: Satisfactory progress - ACTION (Actively working towards change); Intervened by reinforcing change plan / affirming steps taken     Current / Ongoing Stressors and Concerns:   Patient is currently  socially isolated. She has a conflictual relationship with her .  She is getting minimal physical activity.  She had recent eye surgery and shoulder surgery.     Treatment Objective(s) Addressed in This Session:   Patient will increase frequency of engaging her in ADLs.  Patient will track and record at least 5 pleasant exchanges with . Patient will be able to identify at least 5 positive traits about her .  Patient will reduce level of depressive and anxious features as evidenced by reduction in score on her CHAVO-7 and PHQ-9 (scores of 15 and 16 at first measurment, respectively).     Intervention:   Person-Centered Therapy, Solution Focused Therapy, Supportive Therapy: Discussed concerns with finances and health care lately. Talked about judgment she received from friends about finances. Discussed the loan she is taking out and whether this is something she thought through or whether it was impulsive. Reviewed homework and identified successes and barriers to completion.    The following assessments were completed by patient for this visit:  PHQ9:   PHQ-9 SCORE 2/22/2022 2/22/2022 3/2/2022 3/15/2022 3/18/2022 3/24/2022 4/1/2022   PHQ-9 Total Score MyChart 17 (Moderately severe depression) 14 (Moderate depression) 13 (Moderate depression) 15 (Moderately severe depression) 11 (Moderate depression) 12 (Moderate depression) 8 (Mild depression)   PHQ-9 Total Score 14 17 13 15 11 12 8   Some encounter information is confidential and restricted. Go to Review Flowsheets activity to see all data.     GAD2:   CHAVO-2 3/2/2022 3/2/2022 3/15/2022 3/15/2022 3/15/2022 3/24/2022 4/1/2022   Feeling nervous, anxious, or on edge 3 3 3 3 3 2 2   Not being able to stop or control worrying 3 3 2 2 2 1 1   CHAVO-2 Total Score 6 6 5 5 5 3 3     PROMIS 10-Global Health (only subscores and total score):   PROMIS-10 Scores Only 12/14/2021 12/14/2021 3/15/2022 3/15/2022 3/15/2022 3/24/2022 4/1/2022   Global Mental Health  Score 6 6 6 6 6 8 12   Global Physical Health Score 9 9 9 9 9 8 11   PROMIS TOTAL - SUBSCORES 15 15 15 15 15 16 23         ASSESSMENT: Current Emotional / Mental Status (status of significant symptoms):   Risk status (Self / Other harm or suicidal ideation)   Patient denies current fears or concerns for personal safety.   Patient denies current or recent suicidal ideation or behaviors.   Patient denies current or recent homicidal ideation or behaviors.   Patient denies current or recent self injurious behavior or ideation.   Patient denies other safety concerns.   Patient reports there has been no change in risk factors since their last session.     Patient reports there has been no change in protective factors since their last session.     A safety and risk management plan has been developed including: Patient has no change in safety concerns. Committed to safety and agreed to follow previously developed safety plan..  Patient consented to co-developed safety plan.  Safety and risk management plan was completed.  Patient agreed to use safety plan should any safety concerns arise.  A copy was given to the patient.     Appearance:   Appropriate    Eye Contact:   Good    Psychomotor Behavior: Normal    Attitude:   Cooperative    Orientation:   All   Speech    Rate / Production: Normal/ Responsive    Volume:  Normal    Mood:    Normal   Affect:    Appropriate    Thought Content:  Clear    Thought Form:  Coherent    Insight:    Good      Medication Review:   No changes to current psychiatric medication(s)     Medication Compliance:   Yes     Changes in Health Issues:   None reported     Chemical Use Review:   Substance Use: Chemical use reviewed, no active concerns identified Nothing used since August 2021.     Tobacco Use: No current tobacco use.      Diagnosis:  1. Bipolar 2 disorder (H)    2. Generalized anxiety disorder    3. Trauma and stressor-related disorder      Collateral Reports Completed:   Not  Applicable    PLAN: (Patient Tasks / Therapist Tasks / Other)  Work on finances (taxes, budgeting)    Look in May for couples therapist  Continue to journal before sessions      In the future- work with this part of yourself that spends the money- not wanting to miss out, as well as how you're hurt by family and money     In the future, return to: the part that puts up walls and the part that is sensitive to rejection      There has been demonstrated improvement in functioning while patient has been engaged in psychotherapy/psychological service- if withdrawn the patient would deteriorate and/or relapse.     MICAELA SLADE   2022                                                         ______________________________________________________________________    Individual Treatment Plan    Patient's Name: Randi Cleary  YOB: 1955    Date of Creation: 20  Date Treatment Plan Last Reviewed/Revised: 3/9/22    DSM5 Diagnoses: 296.89 Bipolar II Disorder Depressed, 300.02 (F41.1) Generalized Anxiety Disorder or Adjustment Disorders  309.89 (F43.8) Other Specified Trauma and Stressor Related Disorder  Psychosocial / Contextual Factors: Patient's entire family of origin has , she now has a sister-in-law and  as support.  Relationship with  is conflictual. She is recovering from surgeries  PROMIS (reviewed every 90 days): PROMIS-10 Scores               Referral / Collaboration:  Referral to another professional/service is not indicated at this time..    Anticipated number of session for this episode of care: 50  Anticipation frequency of session: Biweekly  Anticipated Duration of each session: 53 or more minutes due to intensity of trauma symptoms  Treatment plan will be reviewed in 90 days or when goals have been changed.   There has been demonstrated improvement in functioning while patient has been engaged in psychotherapy/psychological service- if withdrawn the patient  "would deteriorate and/or relapse.       MeasurableTreatment Goal(s) related to diagnosis / functional impairment(s)  Goal 1: Patient will \"jumpstart, getting going with the things I need to be doing around the house as far as picking up, doing things, trying to do something every day.  Also to lessen the animosity between me and my .\"    I will know I've met my goal when my shoulders are fixed and I can see.      Objective #A (Patient Action)    Patient will increase frequency of engaging her in ADLs.  Status: Continued - Date(s): 12/10/21, 3/9/22    Intervention(s)  Therapist will engage patient in CBT, specifically behavioral activation.    Objective #B  Patient will  track and record at least 5 pleasant exchanges with . Patient will be able to identify at least 5 positive traits about her  and how he relates to her.  Status: Continued - Date(s): 12/10/21, 3/9/22    Intervention(s)  Therapist will teach assertiveness skills and assign homework related to relationship interactions.    Objective #C  Patient will reduce level of depressive and anxious features as evidenced by reduction in score on her CHAVO-7 and PHQ-9 (scores of 15 and 16 at first measurment, respectively).  Status: Continued - Date(s): 12/10/21, 3/9/22    Intervention(s)  Therapist will engage patient in person-centered therapy and CBT.    Patient has reviewed and agreed to the above plan.      MICAELA SLADE  March 9, 2022                                                   Randi Cleary          SAFETY PLAN:  Step 1: Warning signs / cues (Thoughts, images, mood, situation, behavior) that a crisis may be developing:  ? Thoughts: \"I don't want to continue\" \"I am unwanted\"  ? Images: none  ? Thinking Processes: ruminating  ? Mood: anger  ? Behaviors: isolating/withdrawing , can't stop crying, not taking care of myself and not taking care of my responsibilities  ? Situations: small triggers, such as not being able to find " "something, or dropping something   Step 2: Coping strategies - Things I can do to take my mind off of my problems without contacting another person (relaxation technique, physical activity):  ? Distress Tolerance Strategies:  arts and crafts: drawing, play with my pet , listen to positive and upbeat music: any, change body temperature (ice pack/cold water)  and paced breathing/progressive muscle relaxation  ? Physical Activities: go for a walk, deep breathing and stretching   ? Focus on helpful thoughts:  \"You've been through this before, you can get through it again.\"  Step 3: People and social settings that provide distraction:                 Name: Carmen                            Name: Darien                           Name: Aleida       ? pool, shopping, Carmen's house, [employer]       Step 4: Remind myself of people and things that are important to me and worth living for:  Clifford Little Donna, post-COVID world, options of what could be in your future        Step 5: When I am in crisis, I can ask these people to help me use my safety plan:                 Name: Sidney  Step 6: Making the environment safe:   ? go to sleep/daydream  Step 7: Professionals or agencies I can contact during a crisis:  ? [facility] Daytime Number: [phone]  ? Suicide Prevention Lifeline: 7-787-682-MVFT (6407)  ? Crisis Text Line Service (available 24 hours a day, 7 days a week): Text [place] to 002333    Local Crisis Services: [facility] Crisis: [phone]     Call 911 or go to my nearest emergency department.       I helped develop this safety plan and agree to use it when needed.  I have been given a copy of this plan.       Client signature _________________________________________________________________  Today s date:  11/24/2020  Adapted from Safety Plan Template 2008 Randi Poole and Robby Barba is reprinted with the express permission of the authors.  No portion of the Safety Plan Template may be " reproduced without the express, written permission.  You can contact the authors at [email or web address] or [email or web address].

## 2022-04-06 ENCOUNTER — OFFICE VISIT (OUTPATIENT)
Dept: PALLIATIVE MEDICINE | Facility: OTHER | Age: 67
End: 2022-04-06
Payer: MEDICARE

## 2022-04-06 VITALS
SYSTOLIC BLOOD PRESSURE: 141 MMHG | OXYGEN SATURATION: 95 % | WEIGHT: 256 LBS | HEART RATE: 105 BPM | DIASTOLIC BLOOD PRESSURE: 69 MMHG | BODY MASS INDEX: 41.32 KG/M2

## 2022-04-06 DIAGNOSIS — G95.20 CORD COMPRESSION (H): Primary | ICD-10-CM

## 2022-04-06 DIAGNOSIS — M54.12 CERVICAL RADICULOPATHY: ICD-10-CM

## 2022-04-06 DIAGNOSIS — M47.12 CERVICAL SPONDYLOSIS WITH MYELOPATHY: ICD-10-CM

## 2022-04-06 PROCEDURE — 99214 OFFICE O/P EST MOD 30 MIN: CPT | Performed by: ANESTHESIOLOGY

## 2022-04-06 RX ORDER — METHOCARBAMOL 500 MG/1
500 TABLET, FILM COATED ORAL 4 TIMES DAILY
Qty: 112 TABLET | Refills: 3 | Status: SHIPPED | OUTPATIENT
Start: 2022-04-06 | End: 2022-11-28

## 2022-04-06 RX ORDER — HYDROCODONE BITARTRATE AND ACETAMINOPHEN 5; 325 MG/1; MG/1
1 TABLET ORAL 3 TIMES DAILY PRN
Qty: 70 TABLET | Refills: 0 | Status: SHIPPED | OUTPATIENT
Start: 2022-04-16 | End: 2022-04-27

## 2022-04-06 ASSESSMENT — PAIN SCALES - GENERAL: PAINLEVEL: MODERATE PAIN (5)

## 2022-04-06 NOTE — LETTER
4/6/2022         RE: Randi Cleary  [address] [facility] [address]        Dear Colleague,    Thank you for referring your patient, Randi Cleary, to the [facility]. Please see a copy of my visit note below.      Patient presents to the clinic today for a follow up with AXEL WIGGINS MD regarding Pain Management.      PEG Score 2/17/2022 2/17/2022 4/6/2022   PEG Total Score 7.33 8 6.67         UDT/CSA- 12/17/2021      Medications-        Hoping to get more methocarbamol (ROBAXIN) 500 MG tablet  At a time.     QUESTIONS:            Lorena Loo  [facility] Patient Facilitator    [facility] - Office Visit    ASSESSMENT & PLAN     Randi was seen today for pain.    Diagnoses and all orders for this visit:    Cord compression (H)  -     PAIN INJECTION EVAL/TREAT/FOLLOW UP; Future  -     HYDROcodone-acetaminophen (NORCO) 5-325 MG tablet; Take 1 tablet by mouth 3 times daily as needed for breakthrough pain or moderate to severe pain May fill/start 4/2/22  -     methocarbamol (ROBAXIN) 500 MG tablet; Take 1 tablet (500 mg) by mouth 4 times daily Wean down on your frequency. - May fill/start 3/31/22    Cervical radiculopathy  -     methocarbamol (ROBAXIN) 500 MG tablet; Take 1 tablet (500 mg) by mouth 4 times daily Wean down on your frequency. - May fill/start 3/31/22    Cervical spondylosis with myelopathy  -     methocarbamol (ROBAXIN) 500 MG tablet; Take 1 tablet (500 mg) by mouth 4 times daily Wean down on your frequency. - May fill/start 3/31/22        Patient Instructions   PLAN:    At checkout may schedule for occipital nerve block for headaches coming from your neck.    Continue physical therapy.    Continue methocarbamol 500 mg 4 times a day as needed.    Increase oxcarbazepine to 150 mg 1 at bedtime.    Continue with lithium orotate 10 mg at bedtime.    Hydrocodone 5 mg twice a day, occasionally increase to 3 times a day.    Discussed  you are working on returning to the swimming pool    Continue working with your psychotherapist.    Follow-up with Dr. Wiggins in 2 to 3 months        -----  AXEL WIGGINS MD  [facility]       SUBJECTIVE      Randi Cleary is a 66 year old year old female who presents to clinic today for the following:     Has been followed for mood disorder, also chronic pain with a history of cervical laminectomy and foraminotomy in December.    Reviewed the record follow-up 4/5 neurosurgery thought to be recovering well, going to physical therapy, to have an evaluation for possible exacerbation of right carpal tunnel syndrome.    3/21 is seen in the emergency room, facial swelling, put on steroids.    She reviews can have pain indicating her occipital area up into her ears which is tough sometimes.  Worse that she tried different activity.  Tried cocaine and found with the reaching and stretching was a challenge.  Does find benefit for the methocarbamol takes up to 4 times a day would like to continue.  She is trying to watch her posture, where her shoulders can ride up in her neck be forward flexed.    Has been going to physical therapy at the [facility] which she enjoys.  Has not yet gone back to the pool, notes it can be hard when getting out of the pool or is cold air in the gym.  Recalls when she was going 5 times a week last summer it was very positive for her life in many respects but then developed cold but has not gone back.    Was in the emergency room for facial swelling, can still be tender under her right ear, to have a CT study looking for a parotid stone.  She was placed on prednisone which did not affect her mood particularly.    Describes in general her mood is better, not as overwhelmed as agitated.  In part attributes to different medical cannabis preparations trying the flower.  Tincture did cause agitation.    She continues talking to her therapist weekly.    Has been using  oxcarbazepine 75 mg at bedtime for a few weeks, thinks can increase to 150 today and see if that is helpful.    He is using lithium orotate 10 mg at bedtime which she finds helpful for mood.    She is pleased that she was able to reestablish with her primary care provider and think she may have had some misunderstandings about the situation.    Uses hydrocodone 5 mg twice a day occasionally 3 times a day.   reviewed as expected.  Last urine drug test in December as expected      Current Outpatient Medications:      acetaminophen (TYLENOL) 325 MG tablet, Take 2 tablets (650 mg) by mouth every 4 hours as needed for other (For optimal non-opioid multimodal pain management to improve pain control.), Disp: , Rfl:      albuterol (PROVENTIL) (2.5 MG/3ML) 0.083% neb solution, Take 1 vial (2.5 mg) by nebulization every 4 hours as needed for shortness of breath / dyspnea or wheezing, Disp: 360 mL, Rfl: 5     albuterol (VENTOLIN HFA) 108 (90 Base) MCG/ACT inhaler, Inhale 2 puffs into the lungs every 6 hours, Disp: 18 g, Rfl: 11     Cholecalciferol (VITAMIN D3) 250 MCG (83050 UT) TABS, Take 1 tablet by mouth daily 63601/day, Disp: , Rfl:      Cyanocobalamin (VITAMIN B-12) 5000 MCG SUBL, Place 2-3 sprays under the tongue daily Unknown dose. 2 or 3 sprays/day, Disp: , Rfl:      ethacrynic acid (EDECRIN) 25 MG tablet, Take 1 tablet by mouth on Saturday and Wednesday, Disp: 30 tablet, Rfl: 11     Fluticasone-Umeclidin-Vilanterol (TRELEGY ELLIPTA) 200-62.5-25 MCG/INH oral inhaler, Inhale 1 puff into the lungs daily, Disp: 1 each, Rfl: 11     [START ON 4/16/2022] HYDROcodone-acetaminophen (NORCO) 5-325 MG tablet, Take 1 tablet by mouth 3 times daily as needed for breakthrough pain or moderate to severe pain May fill/start 4/2/22, Disp: 70 tablet, Rfl: 0     LITHIUM PO, Take 25 mg by mouth daily OTC lithium oratate, Disp: , Rfl:      losartan (COZAAR) 25 MG tablet, Take 1 tablet (25 mg) by mouth daily, Disp: 90 tablet, Rfl: 2      magnesium 250 MG tablet, Take 1 tablet by mouth 2 times daily, Disp: , Rfl:      medical cannabis (Patient's own supply), See Admin Instructions (The purpose of this order is to document that the patient reports taking medical cannabis.  This is not a prescription, and is not used to certify that the patient has a qualifying medical condition.), Disp: , Rfl:      methocarbamol (ROBAXIN) 500 MG tablet, Take 1 tablet (500 mg) by mouth 4 times daily Wean down on your frequency. - May fill/start 3/31/22, Disp: 112 tablet, Rfl: 3     Multiple Vitamins-Iron (MULTIVITAMIN PLUS IRON ADULT) TABS, Take 4 tablets by mouth daily, Disp: 120 tablet, Rfl: 0     omeprazole (PRILOSEC) 20 MG DR capsule, Take 20 mg by mouth daily , Disp: , Rfl:      OXcarbazepine (TRILEPTAL) 150 MG tablet, One-half tab bedtime 3 nights, one-half tab twice a day 5 days, one twice a day (Patient taking differently: Take 75 mg by mouth daily ), Disp: 30 tablet, Rfl: 3     potassium chloride ER (KLOR-CON M) 20 MEQ CR tablet, Take 1 tablet (20 mEq) by mouth daily, Disp: 90 tablet, Rfl: 3     rOPINIRole (REQUIP) 2 MG tablet, Take 1-2 tablets (2-4 mg) by mouth At Bedtime, Disp: 120 tablet, Rfl: 3     SYNTHROID 150 MCG tablet, Take 1 tablet (150 mcg) by mouth daily, Disp: 90 tablet, Rfl: 4     Turmeric Curcumin 500 MG CAPS, Take 500 mg by mouth daily , Disp: , Rfl:      vitamin (B COMPLEX-C) tablet, Take 1 tablet by mouth daily, Disp: , Rfl:      vitamin E 400 units TABS, Take 800 Units by mouth daily, Disp: , Rfl:       Review of Systems   General, psych, musculoskeletal, bowels and bladder otherwise normal other than above.          OBJECTIVE   BP (!) 171/102   Pulse 106   Ht 1.829 m (6')   Wt 103.8 kg (228 lb 14.4 oz)   SpO2 97%   BMI 31.04 kg/m        Physical Exam  General:  Normal appearance, no apparent distress  Cardiovascular: Normal rate  Lungs: Pulmonary effort is normal, speaking in full sentences  MSK: Over the occipital area she is tender  to palpation.  We discussed trigger points and she would like to look into this.  Skin: Warm and dry. No concerning rashes or lesions.  Neurologic: No focal deficit, alert and oriented x3  Psychiatric: Affect is much wider than other times seen, much more composed and organized.      Assessment: Regarding mood disorder she will continue with the medical cannabis, seen her psychotherapist, the lithium orotate and will have a trial of increasing the oxcarbazepine to 1 to 50 mg at bedtime.    For the neck pain will have her schedule for occipital nerve block.  She will continue with the methocarbamol.  We will look to decrease the hydrocodone.  She is encouraged to get back to the swimming pool which is helpful for her pain, physical function, mood and structure.    Total time more than 30 minutes        Again, thank you for allowing me to participate in the care of your patient.        Sincerely,        AXEL WIGGINS MD

## 2022-04-06 NOTE — PROGRESS NOTES
Patient presents to the clinic today for a follow up with AXEL WIGGINS MD regarding Pain Management.      PEG Score 2/17/2022 2/17/2022 4/6/2022   PEG Total Score 7.33 8 6.67         UDT/CSA- 12/17/2021      Medications-        Hoping to get more methocarbamol (ROBAXIN) 500 MG tablet  At a time.     QUESTIONS:            Lorena GOODWIN [facility] Patient Facilitator

## 2022-04-06 NOTE — PROGRESS NOTES
[facility] - Office Visit    ASSESSMENT & PLAN     Randi was seen today for pain.    Diagnoses and all orders for this visit:    Cord compression (H)  -     PAIN INJECTION EVAL/TREAT/FOLLOW UP; Future  -     HYDROcodone-acetaminophen (NORCO) 5-325 MG tablet; Take 1 tablet by mouth 3 times daily as needed for breakthrough pain or moderate to severe pain May fill/start 4/2/22  -     methocarbamol (ROBAXIN) 500 MG tablet; Take 1 tablet (500 mg) by mouth 4 times daily Wean down on your frequency. - May fill/start 3/31/22    Cervical radiculopathy  -     methocarbamol (ROBAXIN) 500 MG tablet; Take 1 tablet (500 mg) by mouth 4 times daily Wean down on your frequency. - May fill/start 3/31/22    Cervical spondylosis with myelopathy  -     methocarbamol (ROBAXIN) 500 MG tablet; Take 1 tablet (500 mg) by mouth 4 times daily Wean down on your frequency. - May fill/start 3/31/22        Patient Instructions   PLAN:    At checkout may schedule for occipital nerve block for headaches coming from your neck.    Continue physical therapy.    Continue methocarbamol 500 mg 4 times a day as needed.    Increase oxcarbazepine to 150 mg 1 at bedtime.    Continue with lithium orotate 10 mg at bedtime.    Hydrocodone 5 mg twice a day, occasionally increase to 3 times a day.    Discussed you are working on returning to the swimming pool    Continue working with your psychotherapist.    Follow-up with Dr. Wiggins in 2 to 3 months        -----  AXEL WIGGINS MD  [facility]       SUBJECTIVE      Randi Cleary is a 66 year old year old female who presents to clinic today for the following:     Has been followed for mood disorder, also chronic pain with a history of cervical laminectomy and foraminotomy in December.    Reviewed the record follow-up 4/5 neurosurgery thought to be recovering well, going to physical therapy, to have an evaluation for possible exacerbation of right carpal tunnel  syndrome.    3/21 is seen in the emergency room, facial swelling, put on steroids.    She reviews can have pain indicating her occipital area up into her ears which is tough sometimes.  Worse that she tried different activity.  Tried cocaine and found with the reaching and stretching was a challenge.  Does find benefit for the methocarbamol takes up to 4 times a day would like to continue.  She is trying to watch her posture, where her shoulders can ride up in her neck be forward flexed.    Has been going to physical therapy at the [facility] which she enjoys.  Has not yet gone back to the pool, notes it can be hard when getting out of the pool or is cold air in the gym.  Recalls when she was going 5 times a week last summer it was very positive for her life in many respects but then developed cold but has not gone back.    Was in the emergency room for facial swelling, can still be tender under her right ear, to have a CT study looking for a parotid stone.  She was placed on prednisone which did not affect her mood particularly.    Describes in general her mood is better, not as overwhelmed as agitated.  In part attributes to different medical cannabis preparations trying the flower.  Tincture did cause agitation.    She continues talking to her therapist weekly.    Has been using oxcarbazepine 75 mg at bedtime for a few weeks, thinks can increase to 150 today and see if that is helpful.    He is using lithium orotate 10 mg at bedtime which she finds helpful for mood.    She is pleased that she was able to reestablish with her primary care provider and think she may have had some misunderstandings about the situation.    Uses hydrocodone 5 mg twice a day occasionally 3 times a day.   reviewed as expected.  Last urine drug test in December as expected      Current Outpatient Medications:      acetaminophen (TYLENOL) 325 MG tablet, Take 2 tablets (650 mg) by mouth every 4 hours as needed for other (For optimal  non-opioid multimodal pain management to improve pain control.), Disp: , Rfl:      albuterol (PROVENTIL) (2.5 MG/3ML) 0.083% neb solution, Take 1 vial (2.5 mg) by nebulization every 4 hours as needed for shortness of breath / dyspnea or wheezing, Disp: 360 mL, Rfl: 5     albuterol (VENTOLIN HFA) 108 (90 Base) MCG/ACT inhaler, Inhale 2 puffs into the lungs every 6 hours, Disp: 18 g, Rfl: 11     Cholecalciferol (VITAMIN D3) 250 MCG (29366 UT) TABS, Take 1 tablet by mouth daily 03717/day, Disp: , Rfl:      Cyanocobalamin (VITAMIN B-12) 5000 MCG SUBL, Place 2-3 sprays under the tongue daily Unknown dose. 2 or 3 sprays/day, Disp: , Rfl:      ethacrynic acid (EDECRIN) 25 MG tablet, Take 1 tablet by mouth on Saturday and Wednesday, Disp: 30 tablet, Rfl: 11     Fluticasone-Umeclidin-Vilanterol (TRELEGY ELLIPTA) 200-62.5-25 MCG/INH oral inhaler, Inhale 1 puff into the lungs daily, Disp: 1 each, Rfl: 11     [START ON 4/16/2022] HYDROcodone-acetaminophen (NORCO) 5-325 MG tablet, Take 1 tablet by mouth 3 times daily as needed for breakthrough pain or moderate to severe pain May fill/start 4/2/22, Disp: 70 tablet, Rfl: 0     LITHIUM PO, Take 25 mg by mouth daily OTC lithium oratate, Disp: , Rfl:      losartan (COZAAR) 25 MG tablet, Take 1 tablet (25 mg) by mouth daily, Disp: 90 tablet, Rfl: 2     magnesium 250 MG tablet, Take 1 tablet by mouth 2 times daily, Disp: , Rfl:      medical cannabis (Patient's own supply), See Admin Instructions (The purpose of this order is to document that the patient reports taking medical cannabis.  This is not a prescription, and is not used to certify that the patient has a qualifying medical condition.), Disp: , Rfl:      methocarbamol (ROBAXIN) 500 MG tablet, Take 1 tablet (500 mg) by mouth 4 times daily Wean down on your frequency. - May fill/start 3/31/22, Disp: 112 tablet, Rfl: 3     Multiple Vitamins-Iron (MULTIVITAMIN PLUS IRON ADULT) TABS, Take 4 tablets by mouth daily, Disp: 120 tablet,  Rfl: 0     omeprazole (PRILOSEC) 20 MG DR capsule, Take 20 mg by mouth daily , Disp: , Rfl:      OXcarbazepine (TRILEPTAL) 150 MG tablet, One-half tab bedtime 3 nights, one-half tab twice a day 5 days, one twice a day (Patient taking differently: Take 75 mg by mouth daily ), Disp: 30 tablet, Rfl: 3     potassium chloride ER (KLOR-CON M) 20 MEQ CR tablet, Take 1 tablet (20 mEq) by mouth daily, Disp: 90 tablet, Rfl: 3     rOPINIRole (REQUIP) 2 MG tablet, Take 1-2 tablets (2-4 mg) by mouth At Bedtime, Disp: 120 tablet, Rfl: 3     SYNTHROID 150 MCG tablet, Take 1 tablet (150 mcg) by mouth daily, Disp: 90 tablet, Rfl: 4     Turmeric Curcumin 500 MG CAPS, Take 500 mg by mouth daily , Disp: , Rfl:      vitamin (B COMPLEX-C) tablet, Take 1 tablet by mouth daily, Disp: , Rfl:      vitamin E 400 units TABS, Take 800 Units by mouth daily, Disp: , Rfl:       Review of Systems   General, psych, musculoskeletal, bowels and bladder otherwise normal other than above.          OBJECTIVE   BP (!) 171/102   Pulse 106   Ht 1.829 m (6')   Wt 103.8 kg (228 lb 14.4 oz)   SpO2 97%   BMI 31.04 kg/m        Physical Exam  General:  Normal appearance, no apparent distress  Cardiovascular: Normal rate  Lungs: Pulmonary effort is normal, speaking in full sentences  MSK: Over the occipital area she is tender to palpation.  We discussed trigger points and she would like to look into this.  Skin: Warm and dry. No concerning rashes or lesions.  Neurologic: No focal deficit, alert and oriented x3  Psychiatric: Affect is much wider than other times seen, much more composed and organized.      Assessment: Regarding mood disorder she will continue with the medical cannabis, seen her psychotherapist, the lithium orotate and will have a trial of increasing the oxcarbazepine to 1 to 50 mg at bedtime.    For the neck pain will have her schedule for occipital nerve block.  She will continue with the methocarbamol.  We will look to decrease the  hydrocodone.  She is encouraged to get back to the swimming pool which is helpful for her pain, physical function, mood and structure.    Total time more than 30 minutes

## 2022-04-06 NOTE — PATIENT INSTRUCTIONS
PLAN:    At checkout may schedule for occipital nerve block for headaches coming from your neck.    Continue physical therapy.    Continue methocarbamol 500 mg 4 times a day as needed.    Increase oxcarbazepine to 150 mg 1 at bedtime.    Continue with lithium orotate 10 mg at bedtime.    Hydrocodone 5 mg twice a day, occasionally increase to 3 times a day.    Discussed you are working on returning to the swimming pool    Continue working with your psychotherapist.    Follow-up with Dr. Dubois in 2 to 3 months   INTERVAL HPI/OVERNIGHT EVENTS: No new concerns . I feel so better.   Vital Signs Last 24 Hrs  T(C): 36.7 (19 Jun 2020 13:31), Max: 37 (18 Jun 2020 18:00)  T(F): 98.1 (19 Jun 2020 13:31), Max: 98.6 (18 Jun 2020 18:00)  HR: 56 (19 Jun 2020 13:31) (53 - 58)  BP: 151/89 (19 Jun 2020 13:31) (140/77 - 162/97)  BP(mean): 79 (18 Jun 2020 18:00) (79 - 79)  RR: 17 (19 Jun 2020 13:31) (16 - 17)  SpO2: 100% (19 Jun 2020 13:31) (97% - 100%)  I&O's Summary    18 Jun 2020 07:01  -  19 Jun 2020 07:00  --------------------------------------------------------  IN: 960 mL / OUT: 4750 mL / NET: -3790 mL    19 Jun 2020 07:01  -  19 Jun 2020 13:39  --------------------------------------------------------  IN: 120 mL / OUT: 0 mL / NET: 120 mL      MEDICATIONS  (STANDING):  aspirin  chewable 81 milliGRAM(s) Oral daily  carvedilol 25 milliGRAM(s) Oral every 12 hours  furosemide   Injectable 80 milliGRAM(s) IV Push two times a day  heparin   Injectable 5000 Unit(s) SubCutaneous every 12 hours  hydrALAZINE 10 milliGRAM(s) Oral three times a day  isosorbide   dinitrate Tablet (ISORDIL) 5 milliGRAM(s) Oral three times a day  lisinopril 40 milliGRAM(s) Oral daily  simvastatin 20 milliGRAM(s) Oral at bedtime    MEDICATIONS  (PRN):    LABS:                        11.8   6.58  )-----------( 177      ( 19 Jun 2020 06:25 )             39.3     06-19    139  |  104  |  21  ----------------------------<  101<H>  3.9   |  23  |  1.28    Ca    9.1      19 Jun 2020 06:25  Phos  4.0     06-19  Mg     2.2     06-19          CAPILLARY BLOOD GLUCOSE              REVIEW OF SYSTEMS:  CONSTITUTIONAL: No fever, weight loss, or fatigue  EYES: No eye pain, visual disturbances, or discharge  ENMT:  No difficulty hearing, tinnitus, vertigo; No sinus or throat pain  NECK: No pain or stiffness  RESPIRATORY: No cough, wheezing, chills or hemoptysis; No shortness of breath  CARDIOVASCULAR: No chest pain, palpitations, dizziness, or leg swelling  GASTROINTESTINAL: No abdominal or epigastric pain. No nausea, vomiting, or hematemesis; No diarrhea or constipation. No melena or hematochezia.  GENITOURINARY: No dysuria, frequency, hematuria, or incontinence  NEUROLOGICAL: No headaches, memory loss, loss of strength, numbness, or tremors      Consultant(s) Notes Reviewed:  [x ] YES  [ ] NO    PHYSICAL EXAM:  GENERAL: NAD, well-groomed, well-developed,not in any distress ,  HEAD:  Atraumatic, Normocephalic  EYES: EOMI, PERRLA, conjunctiva and sclera clear  ENMT: No tonsillar erythema, exudates, or enlargement; Moist mucous membranes, Good dentition, No lesions  NECK: Supple, No JVD, Normal thyroid  NERVOUS SYSTEM:  Alert & Oriented X3, No focal deficit   CHEST/LUNG: Good air entry bilateral with no  rales, rhonchi, wheezing, or rubs  HEART: Regular rate and rhythm; No murmurs, rubs, or gallops  ABDOMEN: Soft, Nontender, Nondistended; Bowel sounds present  EXTREMITIES:  2+ Peripheral Pulses, No clubbing, cyanosis, or edema  SKIN: No rashes or lesions    Care Discussed with Consultants/Other Providers [ x] YES  [ ] NO    A/P   37 y/o male with hx of systolic and diastolic heart failure, HTN who presents to the ED with progressive dyspnea and LE edema over the past 3-4 days. He has noted that he has started to have more intermittent shortness of breath over the last several weeks but were worse over the last few days. He had started to double his lasix dose to try to help with the swelling with no improvment. He also noted difficulty breathing laying down as well as a cough since yday. No fever, chills, chest pain, abdominal pain or dysuria/diarrhea. He notes that his usual BP ranges around 160s systolic and his meds havent changed. He hasn't seen a cardiologist in a while due to COVID but notes his diet is low salt. No significant weight gain noted. He was found in hypertensive urgency to 250s systolic, briefly on nitro gtt and given lasix, hydralazine .     Problem/Recommendation - 1:  Problem: Acute on chronic combined systolic and diastolic congestive heart failure. Recommendation: IV Lasix . Cardiology and EP helping. Awaiting further work up. Cardiac MRI oupt.   Has Sexual side effect with Coreg.     Problem/Recommendation - 2:  ·  Problem: MUKESH (acute kidney injury).  Recommendation: with CKD stage 3 ..Creatinine stable.      Problem/Recommendation - 3:  ·  Problem: Hypertensive emergency.  Recommendation: BP readings better.      Problem/Recommendation - 4:  ·  Problem: Anemia.  Recommendation: work up noted.      Problem/Recommendation - 5:  ·  Problem: Sexual dysfunction .  Recommendation: Low Testosterone so will replace .

## 2022-04-07 ENCOUNTER — MEDICAL CORRESPONDENCE (OUTPATIENT)
Dept: NEUROSURGERY | Facility: CLINIC | Age: 67
End: 2022-04-07
Payer: MEDICARE

## 2022-04-07 DIAGNOSIS — G95.20 CORD COMPRESSION (H): ICD-10-CM

## 2022-04-07 DIAGNOSIS — M54.12 CERVICAL RADICULOPATHY: ICD-10-CM

## 2022-04-07 DIAGNOSIS — M47.12 CERVICAL SPONDYLOSIS WITH MYELOPATHY: ICD-10-CM

## 2022-04-08 ENCOUNTER — TRANSFERRED RECORDS (OUTPATIENT)
Dept: HEALTH INFORMATION MANAGEMENT | Facility: CLINIC | Age: 67
End: 2022-04-08
Payer: MEDICARE

## 2022-04-08 ENCOUNTER — VIRTUAL VISIT (OUTPATIENT)
Dept: PSYCHOLOGY | Facility: CLINIC | Age: 67
End: 2022-04-08
Payer: MEDICARE

## 2022-04-08 DIAGNOSIS — F43.9 TRAUMA AND STRESSOR-RELATED DISORDER: ICD-10-CM

## 2022-04-08 DIAGNOSIS — F41.1 GENERALIZED ANXIETY DISORDER: ICD-10-CM

## 2022-04-08 DIAGNOSIS — F31.81 BIPOLAR 2 DISORDER (H): Primary | ICD-10-CM

## 2022-04-08 PROCEDURE — 90834 PSYTX W PT 45 MINUTES: CPT | Mod: 95 | Performed by: SOCIAL WORKER

## 2022-04-08 RX ORDER — METHOCARBAMOL 500 MG/1
500 TABLET, FILM COATED ORAL 4 TIMES DAILY
Qty: 112 TABLET | Refills: 3 | OUTPATIENT
Start: 2022-04-08

## 2022-04-08 NOTE — PROGRESS NOTES
[facility]                                     Progress Note    Patient Name: Randi Cleary  Date: 4/8/22         Service Type: Individual     Session Start Time: 8:00 AM  Session End Time: 8:45 AM     Session Length: 45 minutes    Session #: 100    Attendees: Client attended alone    Service Modality:  Video Visit:      Provider verified identity through the following two step process.  Patient provided:  Patient is known previously to provider    Telemedicine Visit: The patient's condition can be safely assessed and treated via synchronous audio and visual telemedicine encounter.      Reason for Telemedicine Visit: Services only offered telehealth    Originating Site (Patient Location): Patient's home    Distant Site (Provider Location): Provider Remote Setting- Home Office    Consent:  The patient/guardian has verbally consented to: the potential risks and benefits of telemedicine (video visit) versus in person care; bill my insurance or make self-payment for services provided; and responsibility for payment of non-covered services.     Patient would like the video invitation sent by:  My Chart    Mode of Communication:  Video Conference via [facility]well    As the provider I attest to compliance with applicable laws and regulations related to telemedicine.    DATA  Extended Session (53+ minutes): No  Interactive Complexity: No  Crisis: No        Progress Since Last Session (Related to Symptoms / Goals / Homework):   Symptoms: Patient has some stability right now, but has worries about finances    Homework: Progress made  Work on finances (taxes, budgeting) -progress made    Look in May for couples therapist  Continue to journal before sessions      In the future- work with this part of yourself that spends the money- not wanting to miss out, as well as how you're hurt by family and money     In the future, return to: the part that puts up walls and the part that is sensitive to  rejection       Episode of Care Goals: Satisfactory progress - ACTION (Actively working towards change); Intervened by reinforcing change plan / affirming steps taken     Current / Ongoing Stressors and Concerns:   Patient is currently socially isolated. She has a conflictual relationship with her .  She is getting minimal physical activity.  She had recent eye surgery and shoulder surgery.     Treatment Objective(s) Addressed in This Session:   Patient will increase frequency of engaging her in ADLs.  Patient will track and record at least 5 pleasant exchanges with . Patient will be able to identify at least 5 positive traits about her .  Patient will reduce level of depressive and anxious features as evidenced by reduction in score on her CHAVO-7 and PHQ-9 (scores of 15 and 16 at first measurment, respectively).     Intervention:   Person-Centered Therapy, Solution Focused Therapy, Supportive Therapy: Discussed stressors patient has dealt with, including her outstanding medical bill and her home equity loan. Also talked about how she is stressed as her taxes are not done, but has made some progress in making a phone call about this.    The following assessments were completed by patient for this visit:  PHQ9:   PHQ-9 SCORE 2/22/2022 2/22/2022 3/2/2022 3/15/2022 3/18/2022 3/24/2022 4/1/2022   PHQ-9 Total Score MyChart 17 (Moderately severe depression) 14 (Moderate depression) 13 (Moderate depression) 15 (Moderately severe depression) 11 (Moderate depression) 12 (Moderate depression) 8 (Mild depression)   PHQ-9 Total Score 14 17 13 15 11 12 8   Some encounter information is confidential and restricted. Go to Review Flowsheets activity to see all data.     GAD2:   CHAVO-2 3/2/2022 3/2/2022 3/15/2022 3/15/2022 3/15/2022 3/24/2022 4/1/2022   Feeling nervous, anxious, or on edge 3 3 3 3 3 2 2   Not being able to stop or control worrying 3 3 2 2 2 1 1   CHAVO-2 Total Score 6 6 5 5 5 3 3     PROMIS  10-Global Health (only subscores and total score):   PROMIS-10 Scores Only 12/14/2021 12/14/2021 3/15/2022 3/15/2022 3/15/2022 3/24/2022 4/1/2022   Global Mental Health Score 6 6 6 6 6 8 12   Global Physical Health Score 9 9 9 9 9 8 11   PROMIS TOTAL - SUBSCORES 15 15 15 15 15 16 23         ASSESSMENT: Current Emotional / Mental Status (status of significant symptoms):   Risk status (Self / Other harm or suicidal ideation)   Patient denies current fears or concerns for personal safety.   Patient denies current or recent suicidal ideation or behaviors.   Patient denies current or recent homicidal ideation or behaviors.   Patient denies current or recent self injurious behavior or ideation.   Patient denies other safety concerns.   Patient reports there has been no change in risk factors since their last session.     Patient reports there has been no change in protective factors since their last session.     A safety and risk management plan has been developed including: Patient has no change in safety concerns. Committed to safety and agreed to follow previously developed safety plan..  Patient consented to co-developed safety plan.  Safety and risk management plan was completed.  Patient agreed to use safety plan should any safety concerns arise.  A copy was given to the patient.     Appearance:   Appropriate    Eye Contact:   Good    Psychomotor Behavior: Normal    Attitude:   Cooperative    Orientation:   All   Speech    Rate / Production: Normal/ Responsive    Volume:  Normal    Mood:    Normal   Affect:    Appropriate    Thought Content:  Clear    Thought Form:  Coherent    Insight:    Good      Medication Review:   No changes to current psychiatric medication(s)     Medication Compliance:   Yes     Changes in Health Issues:   None reported     Chemical Use Review:   Substance Use: Chemical use reviewed, no active concerns identified Nothing used since August 2021.     Tobacco Use: No current tobacco use.       Diagnosis:  1. Bipolar 2 disorder (H)    2. Generalized anxiety disorder    3. Trauma and stressor-related disorder      Collateral Reports Completed:   Not Applicable    PLAN: (Patient Tasks / Therapist Tasks / Other)  Work on finances (taxes, budgeting)    Look in May for couples therapist  Continue to journal before sessions      In the future- work with this part of yourself that spends the money- not wanting to miss out, as well as how you're hurt by family and money     In the future, return to: the part that puts up walls and the part that is sensitive to rejection      There has been demonstrated improvement in functioning while patient has been engaged in psychotherapy/psychological service- if withdrawn the patient would deteriorate and/or relapse.     MICAELA SLADE   2022                                                       ______________________________________________________________________    Individual Treatment Plan    Patient's Name: Randi Cleary  YOB: 1955    Date of Creation: 20  Date Treatment Plan Last Reviewed/Revised: 3/9/22    DSM5 Diagnoses: 296.89 Bipolar II Disorder Depressed, 300.02 (F41.1) Generalized Anxiety Disorder or Adjustment Disorders  309.89 (F43.8) Other Specified Trauma and Stressor Related Disorder  Psychosocial / Contextual Factors: Patient's entire family of origin has , she now has a sister-in-law and  as support.  Relationship with  is conflictual. She is recovering from surgeries  PROMIS (reviewed every 90 days): PROMIS-10 Scores               Referral / Collaboration:  Referral to another professional/service is not indicated at this time..    Anticipated number of session for this episode of care: 50  Anticipation frequency of session: Biweekly  Anticipated Duration of each session: 53 or more minutes due to intensity of trauma symptoms  Treatment plan will be reviewed in 90 days or when goals have been  "changed.   There has been demonstrated improvement in functioning while patient has been engaged in psychotherapy/psychological service- if withdrawn the patient would deteriorate and/or relapse.       MeasurableTreatment Goal(s) related to diagnosis / functional impairment(s)  Goal 1: Patient will \"jumpstart, getting going with the things I need to be doing around the house as far as picking up, doing things, trying to do something every day.  Also to lessen the animosity between me and my .\"    I will know I've met my goal when my shoulders are fixed and I can see.      Objective #A (Patient Action)    Patient will increase frequency of engaging her in ADLs.  Status: Continued - Date(s): 12/10/21, 3/9/22    Intervention(s)  Therapist will engage patient in CBT, specifically behavioral activation.    Objective #B  Patient will  track and record at least 5 pleasant exchanges with . Patient will be able to identify at least 5 positive traits about her  and how he relates to her.  Status: Continued - Date(s): 12/10/21, 3/9/22    Intervention(s)  Therapist will teach assertiveness skills and assign homework related to relationship interactions.    Objective #C  Patient will reduce level of depressive and anxious features as evidenced by reduction in score on her CHAVO-7 and PHQ-9 (scores of 15 and 16 at first measurment, respectively).  Status: Continued - Date(s): 12/10/21, 3/9/22    Intervention(s)  Therapist will engage patient in person-centered therapy and CBT.    Patient has reviewed and agreed to the above plan.      MICAELA SLADE  March 9, 2022                                                   Randi Cleary          SAFETY PLAN:  Step 1: Warning signs / cues (Thoughts, images, mood, situation, behavior) that a crisis may be developing:  ? Thoughts: \"I don't want to continue\" \"I am unwanted\"  ? Images: none  ? Thinking " "Processes: ruminating  ? Mood: anger  ? Behaviors: isolating/withdrawing , can't stop crying, not taking care of myself and not taking care of my responsibilities  ? Situations: small triggers, such as not being able to find something, or dropping something   Step 2: Coping strategies - Things I can do to take my mind off of my problems without contacting another person (relaxation technique, physical activity):  ? Distress Tolerance Strategies:  arts and crafts: drawing, play with my pet , listen to positive and upbeat music: any, change body temperature (ice pack/cold water)  and paced breathing/progressive muscle relaxation  ? Physical Activities: go for a walk, deep breathing and stretching   ? Focus on helpful thoughts:  \"You've been through this before, you can get through it again.\"  Step 3: People and social settings that provide distraction:                 Name: Carmen                            Name: Darien                           Name: Aleida       ? pool, shopping, Carmen's house, [employer]       Step 4: Remind myself of people and things that are important to me and worth living for:  Clifford Little Donna, post-COVID world, options of what could be in your future        Step 5: When I am in crisis, I can ask these people to help me use my safety plan:                 Name: Sidney  Step 6: Making the environment safe:   ? go to sleep/daydream  Step 7: Professionals or agencies I can contact during a crisis:  ? [facility] Daytime Number: [phone]  ? Suicide Prevention Lifeline: 7-060-100-TGFH (5616)  ? Crisis Text Line Service (available 24 hours a day, 7 days a week): Text [place] to 678647    Local Crisis Services: [facility] Crisis: [phone]     Call 911 or go to my nearest emergency department.       I helped develop this safety plan and agree to use it when needed.  I have been given a copy of this plan.       Client " signature _________________________________________________________________  Today s date:  11/24/2020  Adapted from Safety Plan Template 2008 Randi Poole and Robby Barba is reprinted with the express permission of the authors.  No portion of the Safety Plan Template may be reproduced without the express, written permission.  You can contact the authors at [email or web address] or [email or web address].

## 2022-04-08 NOTE — TELEPHONE ENCOUNTER
Refill request refused - patient should have refills on file.    Methocarbamol was refilled on 4/6/2022  Qty- 112, Ref- 3        Bianca Soria RN  04/08/22 12:47 PM  [facility] Nurse Advisor

## 2022-04-12 ENCOUNTER — VIRTUAL VISIT (OUTPATIENT)
Dept: PSYCHOLOGY | Facility: CLINIC | Age: 67
End: 2022-04-12
Payer: MEDICARE

## 2022-04-12 DIAGNOSIS — F41.1 GENERALIZED ANXIETY DISORDER: ICD-10-CM

## 2022-04-12 DIAGNOSIS — F31.81 BIPOLAR 2 DISORDER (H): Primary | ICD-10-CM

## 2022-04-12 DIAGNOSIS — F43.9 TRAUMA AND STRESSOR-RELATED DISORDER: ICD-10-CM

## 2022-04-12 PROCEDURE — 90834 PSYTX W PT 45 MINUTES: CPT | Mod: 95 | Performed by: SOCIAL WORKER

## 2022-04-12 ASSESSMENT — ANXIETY QUESTIONNAIRES
1. FEELING NERVOUS, ANXIOUS, OR ON EDGE: NEARLY EVERY DAY
7. FEELING AFRAID AS IF SOMETHING AWFUL MIGHT HAPPEN: SEVERAL DAYS
GAD7 TOTAL SCORE: 15
3. WORRYING TOO MUCH ABOUT DIFFERENT THINGS: MORE THAN HALF THE DAYS
5. BEING SO RESTLESS THAT IT IS HARD TO SIT STILL: SEVERAL DAYS
7. FEELING AFRAID AS IF SOMETHING AWFUL MIGHT HAPPEN: SEVERAL DAYS
6. BECOMING EASILY ANNOYED OR IRRITABLE: NEARLY EVERY DAY
GAD7 TOTAL SCORE: 15
4. TROUBLE RELAXING: NEARLY EVERY DAY
GAD7 TOTAL SCORE: 15
2. NOT BEING ABLE TO STOP OR CONTROL WORRYING: MORE THAN HALF THE DAYS

## 2022-04-13 ASSESSMENT — ANXIETY QUESTIONNAIRES: GAD7 TOTAL SCORE: 15

## 2022-04-13 ASSESSMENT — PATIENT HEALTH QUESTIONNAIRE - PHQ9: SUM OF ALL RESPONSES TO PHQ QUESTIONS 1-9: 17

## 2022-04-14 ENCOUNTER — VIRTUAL VISIT (OUTPATIENT)
Dept: PSYCHOLOGY | Facility: CLINIC | Age: 67
End: 2022-04-14
Payer: MEDICARE

## 2022-04-14 DIAGNOSIS — F41.1 GENERALIZED ANXIETY DISORDER: ICD-10-CM

## 2022-04-14 DIAGNOSIS — F31.81 BIPOLAR 2 DISORDER (H): Primary | ICD-10-CM

## 2022-04-14 DIAGNOSIS — F43.9 TRAUMA AND STRESSOR-RELATED DISORDER: ICD-10-CM

## 2022-04-14 PROCEDURE — 90834 PSYTX W PT 45 MINUTES: CPT | Mod: 95 | Performed by: SOCIAL WORKER

## 2022-04-14 NOTE — PROGRESS NOTES
[facility]                                     Progress Note    Patient Name: Randi Cleary  Date: 4/14/22         Service Type: Individual     Session Start Time: 1:00 PM  Session End Time: 1:48 PM     Session Length: 48 minutes    Session #: 102    Attendees: Client attended alone    Service Modality:  Video Visit:      Provider verified identity through the following two step process.  Patient provided:  Patient is known previously to provider    Telemedicine Visit: The patient's condition can be safely assessed and treated via synchronous audio and visual telemedicine encounter.      Reason for Telemedicine Visit: Services only offered telehealth    Originating Site (Patient Location): Patient's home    Distant Site (Provider Location): Provider Remote Setting- Home Office    Consent:  The patient/guardian has verbally consented to: the potential risks and benefits of telemedicine (video visit) versus in person care; bill my insurance or make self-payment for services provided; and responsibility for payment of non-covered services.     Patient would like the video invitation sent by:  My Chart    Mode of Communication:  Video Conference via [employer]well    As the provider I attest to compliance with applicable laws and regulations related to telemedicine.    DATA  Extended Session (53+ minutes): No  Interactive Complexity: No  Crisis: No        Progress Since Last Session (Related to Symptoms / Goals / Homework):   Symptoms: Patiet is feeling overwhelmed with stresors in her life    Homework: Progress made  Create a schedule to follow -attempted  Get back to budgeting -not yet done    Look in May for couples therapist  Continue to journal before sessions     Episode of Care Goals: Satisfactory progress - ACTION (Actively working towards change); Intervened by reinforcing change plan / affirming steps taken     Current / Ongoing Stressors and Concerns:   Patient is currently socially  isolated. She has a conflictual relationship with her .  She is getting minimal physical activity.  She had recent eye surgery and shoulder surgery.     Treatment Objective(s) Addressed in This Session:   Patient will increase frequency of engaging her in ADLs.  Patient will track and record at least 5 pleasant exchanges with . Patient will be able to identify at least 5 positive traits about her .  Patient will reduce level of depressive and anxious features as evidenced by reduction in score on her CHAVO-7 and PHQ-9 (scores of 15 and 16 at first measurment, respectively).     Intervention:   Person-Centered Therapy, Solution Focused Therapy, Supportive Therapy: Discussed stressors in patient's life, which right now is largely financial stressors. Discussed how to care for herself while having high levels of stressors in her life. Reviewed homework and identified successes and barriers to completion.    The following assessments were completed by patient for this visit:  PHQ9:   PHQ-9 SCORE 2/22/2022 3/2/2022 3/15/2022 3/18/2022 3/24/2022 4/1/2022 4/12/2022   PHQ-9 Total Score MyChart 14 (Moderate depression) 13 (Moderate depression) 15 (Moderately severe depression) 11 (Moderate depression) 12 (Moderate depression) 8 (Mild depression) 17 (Moderately severe depression)   PHQ-9 Total Score 17 13 15 11 12 8 17   Some encounter information is confidential and restricted. Go to Review Flowsheets activity to see all data.     GAD2:   CHAVO-2 3/2/2022 3/2/2022 3/15/2022 3/15/2022 3/15/2022 3/24/2022 4/1/2022   Feeling nervous, anxious, or on edge 3 3 3 3 3 2 2   Not being able to stop or control worrying 3 3 2 2 2 1 1   CHAVO-2 Total Score 6 6 5 5 5 3 3     PROMIS 10-Global Health (only subscores and total score):   PROMIS-10 Scores Only 12/14/2021 12/14/2021 3/15/2022 3/15/2022 3/15/2022 3/24/2022 4/1/2022   Global Mental Health Score 6 6 6 6 6 8 12   Global Physical Health Score 9 9 9 9 9 8 11   PROMIS  TOTAL - SUBSCORES 15 15 15 15 15 16 23         ASSESSMENT: Current Emotional / Mental Status (status of significant symptoms):   Risk status (Self / Other harm or suicidal ideation)   Patient denies current fears or concerns for personal safety.   Patient denies current or recent suicidal ideation or behaviors.   Patient denies current or recent homicidal ideation or behaviors.   Patient denies current or recent self injurious behavior or ideation.   Patient denies other safety concerns.   Patient reports there has been no change in risk factors since their last session.     Patient reports there has been no change in protective factors since their last session.     A safety and risk management plan has been developed including: Patient has no change in safety concerns. Committed to safety and agreed to follow previously developed safety plan..  Patient consented to co-developed safety plan.  Safety and risk management plan was completed.  Patient agreed to use safety plan should any safety concerns arise.  A copy was given to the patient.     Appearance:   Appropriate    Eye Contact:   Good    Psychomotor Behavior: Normal    Attitude:   Cooperative    Orientation:   All   Speech    Rate / Production: Normal/ Responsive    Volume:  Normal    Mood:    Normal   Affect:    Appropriate    Thought Content:  Clear    Thought Form:  Coherent    Insight:    Good      Medication Review:   No changes to current psychiatric medication(s)     Medication Compliance:   Yes     Changes in Health Issues:   None reported     Chemical Use Review:   Substance Use: Chemical use reviewed, no active concerns identified Nothing used since August 2021.     Tobacco Use: No current tobacco use.      Diagnosis:  1. Bipolar 2 disorder (H)    2. Generalized anxiety disorder    3. Trauma and stressor-related disorder      Collateral Reports Completed:   Not Applicable    PLAN: (Patient Tasks / Therapist Tasks / Other)  Create a schedule to  follow  Get back to budgeting    Look in May for couples therapist  Continue to journal before sessions      In the future- work with this part of yourself that spends the money- not wanting to miss out, as well as how you're hurt by family and money     In the future, return to: the part that puts up walls and the part that is sensitive to rejection      There has been demonstrated improvement in functioning while patient has been engaged in psychotherapy/psychological service- if withdrawn the patient would deteriorate and/or relapse.     MICAELA SLADE   2022                                                       ______________________________________________________________________    Individual Treatment Plan    Patient's Name: Randi Cleary  YOB: 1955    Date of Creation: 20  Date Treatment Plan Last Reviewed/Revised: 3/9/22    DSM5 Diagnoses: 296.89 Bipolar II Disorder Depressed, 300.02 (F41.1) Generalized Anxiety Disorder or Adjustment Disorders  309.89 (F43.8) Other Specified Trauma and Stressor Related Disorder  Psychosocial / Contextual Factors: Patient's entire family of origin has , she now has a sister-in-law and  as support.  Relationship with  is conflictual. She is recovering from surgeries  PROMIS (reviewed every 90 days): PROMIS-10 Scores               Referral / Collaboration:  Referral to another professional/service is not indicated at this time..    Anticipated number of session for this episode of care: 50  Anticipation frequency of session: Biweekly  Anticipated Duration of each session: 53 or more minutes due to intensity of trauma symptoms  Treatment plan will be reviewed in 90 days or when goals have been changed.   There has been demonstrated improvement in functioning while patient has been engaged in psychotherapy/psychological service- if withdrawn the patient would deteriorate and/or relapse.       MeasurableTreatment Goal(s)  "related to diagnosis / functional impairment(s)  Goal 1: Patient will \"jumpstart, getting going with the things I need to be doing around the house as far as picking up, doing things, trying to do something every day.  Also to lessen the animosity between me and my .\"    I will know I've met my goal when my shoulders are fixed and I can see.      Objective #A (Patient Action)    Patient will increase frequency of engaging her in ADLs.  Status: Continued - Date(s): 12/10/21, 3/9/22    Intervention(s)  Therapist will engage patient in CBT, specifically behavioral activation.    Objective #B  Patient will  track and record at least 5 pleasant exchanges with . Patient will be able to identify at least 5 positive traits about her  and how he relates to her.  Status: Continued - Date(s): 12/10/21, 3/9/22    Intervention(s)  Therapist will teach assertiveness skills and assign homework related to relationship interactions.    Objective #C  Patient will reduce level of depressive and anxious features as evidenced by reduction in score on her CHAVO-7 and PHQ-9 (scores of 15 and 16 at first measurment, respectively).  Status: Continued - Date(s): 12/10/21, 3/9/22    Intervention(s)  Therapist will engage patient in person-centered therapy and CBT.    Patient has reviewed and agreed to the above plan.      MICAELA SLADE  March 9, 2022                                                   Randi Cleary          SAFETY PLAN:  Step 1: Warning signs / cues (Thoughts, images, mood, situation, behavior) that a crisis may be developing:  ? Thoughts: \"I don't want to continue\" \"I am unwanted\"  ? Images: none  ? Thinking Processes: ruminating  ? Mood: anger  ? Behaviors: isolating/withdrawing , can't stop crying, not taking care of myself and not taking care of my responsibilities  ? Situations: small triggers, such as not being able to find something, or dropping something   Step 2: Coping strategies - Things I " "can do to take my mind off of my problems without contacting another person (relaxation technique, physical activity):  ? Distress Tolerance Strategies:  arts and crafts: drawing, play with my pet , listen to positive and upbeat music: any, change body temperature (ice pack/cold water)  and paced breathing/progressive muscle relaxation  ? Physical Activities: go for a walk, deep breathing and stretching   ? Focus on helpful thoughts:  \"You've been through this before, you can get through it again.\"  Step 3: People and social settings that provide distraction:                 Name: Carmen                            Name: Darien                           Name: Aleida       ? pool, shopping, Carmen's house, [employer]       Step 4: Remind myself of people and things that are important to me and worth living for:  Clifford Little Donna, post-COVID world, options of what could be in your future        Step 5: When I am in crisis, I can ask these people to help me use my safety plan:                 Name: Sidney  Step 6: Making the environment safe:   ? go to sleep/daydream  Step 7: Professionals or agencies I can contact during a crisis:  ? [facility] Daytime Number: [phone]  ? Suicide Prevention Lifeline: 9-404-040-TALK (5914)  ? Crisis Text Line Service (available 24 hours a day, 7 days a week): Text [place] to 198545    Local Crisis Services: [facility] Crisis: [phone]     Call 911 or go to my nearest emergency department.       I helped develop this safety plan and agree to use it when needed.  I have been given a copy of this plan.       Client signature _________________________________________________________________  Today s date:  11/24/2020  Adapted from Safety Plan Template 2008 Randi Poole and Robby Barba is reprinted with the express permission of the authors.  No portion of the Safety Plan Template may be reproduced without the express, written permission.  You can contact the " authors at [email or web address] or [email or web address].

## 2022-04-18 ENCOUNTER — VIRTUAL VISIT (OUTPATIENT)
Dept: PSYCHOLOGY | Facility: CLINIC | Age: 67
End: 2022-04-18
Payer: MEDICARE

## 2022-04-18 ENCOUNTER — PATIENT OUTREACH (OUTPATIENT)
Dept: ONCOLOGY | Facility: CLINIC | Age: 67
End: 2022-04-18
Payer: MEDICARE

## 2022-04-18 DIAGNOSIS — F43.9 TRAUMA AND STRESSOR-RELATED DISORDER: ICD-10-CM

## 2022-04-18 DIAGNOSIS — F31.81 BIPOLAR 2 DISORDER (H): Primary | ICD-10-CM

## 2022-04-18 DIAGNOSIS — F41.1 GENERALIZED ANXIETY DISORDER: ICD-10-CM

## 2022-04-18 DIAGNOSIS — E83.119 HEMOCHROMATOSIS, UNSPECIFIED HEMOCHROMATOSIS TYPE: Primary | ICD-10-CM

## 2022-04-18 PROCEDURE — 90834 PSYTX W PT 45 MINUTES: CPT | Mod: 95 | Performed by: SOCIAL WORKER

## 2022-04-18 ASSESSMENT — PATIENT HEALTH QUESTIONNAIRE - PHQ9
10. IF YOU CHECKED OFF ANY PROBLEMS, HOW DIFFICULT HAVE THESE PROBLEMS MADE IT FOR YOU TO DO YOUR WORK, TAKE CARE OF THINGS AT HOME, OR GET ALONG WITH OTHER PEOPLE: VERY DIFFICULT
SUM OF ALL RESPONSES TO PHQ QUESTIONS 1-9: 18
10. IF YOU CHECKED OFF ANY PROBLEMS, HOW DIFFICULT HAVE THESE PROBLEMS MADE IT FOR YOU TO DO YOUR WORK, TAKE CARE OF THINGS AT HOME, OR GET ALONG WITH OTHER PEOPLE: VERY DIFFICULT
SUM OF ALL RESPONSES TO PHQ QUESTIONS 1-9: 18

## 2022-04-18 NOTE — PROGRESS NOTES
[facility]                                     Progress Note    Patient Name: Randi Cleary  Date: 4/18/22         Service Type: Individual     Session Start Time: 12:31 PM  Session End Time: 1:18 PM     Session Length: 47 minutes    Session #: 103    Attendees: Client attended alone    Service Modality:  Video Visit:      Provider verified identity through the following two step process.  Patient provided:  Patient is known previously to provider    Telemedicine Visit: The patient's condition can be safely assessed and treated via synchronous audio and visual telemedicine encounter.      Reason for Telemedicine Visit: Services only offered telehealth    Originating Site (Patient Location): Patient's home    Distant Site (Provider Location): Provider Remote Setting- Home Office    Consent:  The patient/guardian has verbally consented to: the potential risks and benefits of telemedicine (video visit) versus in person care; bill my insurance or make self-payment for services provided; and responsibility for payment of non-covered services.     Patient would like the video invitation sent by:  My Chart    Mode of Communication:  Video Conference via Amwell    As the provider I attest to compliance with applicable laws and regulations related to telemedicine.    DATA  Extended Session (53+ minutes): No  Interactive Complexity: No  Crisis: No        Progress Since Last Session (Related to Symptoms / Goals / Homework):   Symptoms: Patiet is feeling overwhelmed with stresors in her life    Homework: Progress made  Create a schedule to follow  Get back to budgeting    Look in May for couples therapist  Continue to journal before sessions     Episode of Care Goals: Satisfactory progress - ACTION (Actively working towards change); Intervened by reinforcing change plan / affirming steps taken     Current / Ongoing Stressors and Concerns:   Patient is currently socially isolated. She has a conflictual  "relationship with her .  She is getting minimal physical activity.  She had recent eye surgery and shoulder surgery.     Treatment Objective(s) Addressed in This Session:   Patient will increase frequency of engaging her in ADLs.  Patient will track and record at least 5 pleasant exchanges with . Patient will be able to identify at least 5 positive traits about her .  Patient will reduce level of depressive and anxious features as evidenced by reduction in score on her CHAVO-7 and PHQ-9 (scores of 15 and 16 at first measurment, respectively).     Intervention:   Person-Centered Therapy, Solution Focused Therapy, Supportive Therapy: Talked to patient about home loan and finances. Helped patient notice when she was overwhelmed and to scale this overwhelm. Helped patient sort out the different things that are leading to this. Reviewed using her schedule to help manage the multiple stressors. Talked about how to manage when the overwhelm occurs rather than only using avoidance. Recognized the polarity between the overwhelmed feeling and sensation of \"I must do this.\"    The following assessments were completed by patient for this visit:  PHQ9:   PHQ-9 SCORE 3/15/2022 3/18/2022 3/24/2022 4/1/2022 4/12/2022 4/18/2022 4/18/2022   PHQ-9 Total Score MyChart 15 (Moderately severe depression) 11 (Moderate depression) 12 (Moderate depression) 8 (Mild depression) 17 (Moderately severe depression) - 18 (Moderately severe depression)   PHQ-9 Total Score 15 11 12 8 17 18 18   Some encounter information is confidential and restricted. Go to Review Flowsheets activity to see all data.     GAD2:   CHAVO-2 3/15/2022 3/15/2022 3/24/2022 4/1/2022 4/18/2022 4/18/2022 4/18/2022   Feeling nervous, anxious, or on edge 3 3 2 2 3 3 3   Not being able to stop or control worrying 2 2 1 1 3 3 3   CHAVO-2 Total Score 5 5 3 3 6 6 6     PROMIS 10-Global Health (only subscores and total score):   PROMIS-10 Scores Only 12/14/2021 " 12/14/2021 3/15/2022 3/15/2022 3/15/2022 3/24/2022 4/1/2022   Global Mental Health Score 6 6 6 6 6 8 12   Global Physical Health Score 9 9 9 9 9 8 11   PROMIS TOTAL - SUBSCORES 15 15 15 15 15 16 23         ASSESSMENT: Current Emotional / Mental Status (status of significant symptoms):   Risk status (Self / Other harm or suicidal ideation)   Patient denies current fears or concerns for personal safety.   Patient denies current or recent suicidal ideation or behaviors.   Patient denies current or recent homicidal ideation or behaviors.   Patient denies current or recent self injurious behavior or ideation.   Patient denies other safety concerns.   Patient reports there has been no change in risk factors since their last session.     Patient reports there has been no change in protective factors since their last session.     A safety and risk management plan has been developed including: Patient has no change in safety concerns. Committed to safety and agreed to follow previously developed safety plan..  Patient consented to co-developed safety plan.  Safety and risk management plan was completed.  Patient agreed to use safety plan should any safety concerns arise.  A copy was given to the patient.     Appearance:   Appropriate    Eye Contact:   Good    Psychomotor Behavior: Normal    Attitude:   Cooperative    Orientation:   All   Speech    Rate / Production: Normal/ Responsive    Volume:  Normal    Mood:    Normal   Affect:    Appropriate    Thought Content:  Clear    Thought Form:  Coherent    Insight:    Good      Medication Review:   No changes to current psychiatric medication(s)     Medication Compliance:   Yes     Changes in Health Issues:   None reported     Chemical Use Review:   Substance Use: Chemical use reviewed, no active concerns identified Nothing used since August 2021.     Tobacco Use: No current tobacco use.      Diagnosis:  1. Bipolar 2 disorder (H)    2. Generalized anxiety disorder    3. Trauma  "and stressor-related disorder      Collateral Reports Completed:   Not Applicable    PLAN: (Patient Tasks / Therapist Tasks / Other)  File tax extension  Notice the sensation of \"I'm overwhelmed\" and how it comes up against \"I have to do this\"    In the future:  Create a schedule to follow  Get back to budgeting  Look in May for couples therapist        In the future- work with this part of yourself that spends the money- not wanting to miss out, as well as how you're hurt by family and money     In the future, return to: the part that puts up walls and the part that is sensitive to rejection      There has been demonstrated improvement in functioning while patient has been engaged in psychotherapy/psychological service- if withdrawn the patient would deteriorate and/or relapse.     MICAELA SLADE   2022                                                       ______________________________________________________________________    Individual Treatment Plan    Patient's Name: Randi Cleary  YOB: 1955    Date of Creation: 20  Date Treatment Plan Last Reviewed/Revised: 3/9/22    DSM5 Diagnoses: 296.89 Bipolar II Disorder Depressed, 300.02 (F41.1) Generalized Anxiety Disorder or Adjustment Disorders  309.89 (F43.8) Other Specified Trauma and Stressor Related Disorder  Psychosocial / Contextual Factors: Patient's entire family of origin has , she now has a sister-in-law and  as support.  Relationship with  is conflictual. She is recovering from surgeries  PROMIS (reviewed every 90 days): PROMIS-10 Scores  PROMIS 10-Global Health (only subscores and total score):   PROMIS-10 Scores Only 2021 2021 3/15/2022 3/15/2022 3/15/2022 3/24/2022 2022   Global Mental Health Score 6 6 6 6 6 8 12   Global Physical Health Score 9 9 9 9 9 8 11   PROMIS TOTAL - SUBSCORES 15 15 15 15 15 16 23          Referral / Collaboration:  Referral to another professional/service " "is not indicated at this time..    Anticipated number of session for this episode of care: 50  Anticipation frequency of session: Biweekly  Anticipated Duration of each session: 53 or more minutes due to intensity of trauma symptoms  Treatment plan will be reviewed in 90 days or when goals have been changed.   There has been demonstrated improvement in functioning while patient has been engaged in psychotherapy/psychological service- if withdrawn the patient would deteriorate and/or relapse.       MeasurableTreatment Goal(s) related to diagnosis / functional impairment(s)  Goal 1: Patient will \"jumpstart, getting going with the things I need to be doing around the house as far as picking up, doing things, trying to do something every day.  Also to lessen the animosity between me and my .\"    I will know I've met my goal when my shoulders are fixed and I can see.      Objective #A (Patient Action)    Patient will increase frequency of engaging her in ADLs.  Status: Continued - Date(s): 12/10/21, 3/9/22    Intervention(s)  Therapist will engage patient in CBT, specifically behavioral activation.    Objective #B  Patient will  track and record at least 5 pleasant exchanges with . Patient will be able to identify at least 5 positive traits about her  and how he relates to her.  Status: Continued - Date(s): 12/10/21, 3/9/22    Intervention(s)  Therapist will teach assertiveness skills and assign homework related to relationship interactions.    Objective #C  Patient will reduce level of depressive and anxious features as evidenced by reduction in score on her CHAVO-7 and PHQ-9 (scores of 15 and 16 at first measurment, respectively).  Status: Continued - Date(s): 12/10/21, 3/9/22    Intervention(s)  Therapist will engage patient in person-centered therapy and CBT.    Patient has reviewed and agreed to the above plan.      MICAELA SLADE  March 9, " "2022                                                   Randi Cleary          SAFETY PLAN:  Step 1: Warning signs / cues (Thoughts, images, mood, situation, behavior) that a crisis may be developing:  ? Thoughts: \"I don't want to continue\" \"I am unwanted\"  ? Images: none  ? Thinking Processes: ruminating  ? Mood: anger  ? Behaviors: isolating/withdrawing , can't stop crying, not taking care of myself and not taking care of my responsibilities  ? Situations: small triggers, such as not being able to find something, or dropping something   Step 2: Coping strategies - Things I can do to take my mind off of my problems without contacting another person (relaxation technique, physical activity):  ? Distress Tolerance Strategies:  arts and crafts: drawing, play with my pet , listen to positive and upbeat music: any, change body temperature (ice pack/cold water)  and paced breathing/progressive muscle relaxation  ? Physical Activities: go for a walk, deep breathing and stretching   ? Focus on helpful thoughts:  \"You've been through this before, you can get through it again.\"  Step 3: People and social settings that provide distraction:                 Name: Carmen                            Name: Darien                           Name: Aleida       ? pool, shopping, Carmen's house, [employer]       Step 4: Remind myself of people and things that are important to me and worth living for:  Clifford Little Donna, post-COVID world, options of what could be in your future        Step 5: When I am in crisis, I can ask these people to help me use my safety plan:                 Name: Sidney  Step 6: Making the environment safe:   ? go to sleep/daydream  Step 7: Professionals or agencies I can contact during a crisis:  ? [facility] Daytime Number: [phone]  ? Suicide Prevention Lifeline: 4-605-663-YRWN (5258)  ? Crisis Text Line Service (available 24 hours a day, 7 days a week): Text [place] to 688379    Local Crisis " Services: [facility] Crisis: [phone]     Call 911 or go to my nearest emergency department.       I helped develop this safety plan and agree to use it when needed.  I have been given a copy of this plan.       Client signature _________________________________________________________________  Today s date:  11/24/2020  Adapted from Safety Plan Template 2008 Randi Poole and Robby Barba is reprinted with the express permission of the authors.  No portion of the Safety Plan Template may be reproduced without the express, written permission.  You can contact the authors at [email or web address] or [email or web address].

## 2022-04-18 NOTE — PROGRESS NOTES
[facility]: Cancer Care Short Note                                    Discussion with Patient:                                                      Phoned Winnie to offer a sooner appointment.  She declined sooner appointment and would like to move the May 3rd appointment to in June.  She states she just has too much going on.  Will reschedule to June 7th.  Will update orders so if she goes to a different appointment she can get labs.  Otherwise she will get them same day as appointment

## 2022-04-19 ENCOUNTER — TELEPHONE (OUTPATIENT)
Dept: INTERNAL MEDICINE | Facility: CLINIC | Age: 67
End: 2022-04-19
Payer: MEDICARE

## 2022-04-19 ENCOUNTER — VIRTUAL VISIT (OUTPATIENT)
Dept: PSYCHOLOGY | Facility: CLINIC | Age: 67
End: 2022-04-19
Payer: MEDICARE

## 2022-04-19 DIAGNOSIS — F43.9 TRAUMA AND STRESSOR-RELATED DISORDER: ICD-10-CM

## 2022-04-19 DIAGNOSIS — F41.1 GENERALIZED ANXIETY DISORDER: ICD-10-CM

## 2022-04-19 DIAGNOSIS — F31.81 BIPOLAR 2 DISORDER (H): Primary | ICD-10-CM

## 2022-04-19 PROCEDURE — 90834 PSYTX W PT 45 MINUTES: CPT | Mod: 95 | Performed by: SOCIAL WORKER

## 2022-04-19 ASSESSMENT — PATIENT HEALTH QUESTIONNAIRE - PHQ9
SUM OF ALL RESPONSES TO PHQ QUESTIONS 1-9: 18
SUM OF ALL RESPONSES TO PHQ QUESTIONS 1-9: 18

## 2022-04-19 NOTE — PROGRESS NOTES
"[facility]                                     Progress Note    Patient Name: Randi Cleary  Date: 4/19/22         Service Type: Individual     Session Start Time: 10:31 AM  Session End Time: 11:18 AM     Session Length: 47 minutes    Session #: 104    Attendees: Client attended alone    Service Modality:  Video Visit:      Provider verified identity through the following two step process.  Patient provided:  Patient is known previously to provider    Telemedicine Visit: The patient's condition can be safely assessed and treated via synchronous audio and visual telemedicine encounter.      Reason for Telemedicine Visit: Services only offered telehealth    Originating Site (Patient Location): Patient's home    Distant Site (Provider Location): Provider Remote Setting- Home Office    Consent:  The patient/guardian has verbally consented to: the potential risks and benefits of telemedicine (video visit) versus in person care; bill my insurance or make self-payment for services provided; and responsibility for payment of non-covered services.     Patient would like the video invitation sent by:  My Chart    Mode of Communication:  Video Conference via Amwell    As the provider I attest to compliance with applicable laws and regulations related to telemedicine.    DATA  Extended Session (53+ minutes): No  Interactive Complexity: No  Crisis: No        Progress Since Last Session (Related to Symptoms / Goals / Homework):   Symptoms: Patient continues to feel stressed    Homework: Progress made  File tax extension -not done  Notice the sensation of \"I'm overwhelmed\" and how it comes up against \"I have to do this\" -done     Episode of Care Goals: Satisfactory progress - ACTION (Actively working towards change); Intervened by reinforcing change plan / affirming steps taken     Current / Ongoing Stressors and Concerns:   Patient is currently socially isolated. She has a conflictual relationship with " her .  She is getting minimal physical activity.  She had recent eye surgery and shoulder surgery.     Treatment Objective(s) Addressed in This Session:   Patient will increase frequency of engaging her in ADLs.  Patient will track and record at least 5 pleasant exchanges with . Patient will be able to identify at least 5 positive traits about her .  Patient will reduce level of depressive and anxious features as evidenced by reduction in score on her CHAVO-7 and PHQ-9 (scores of 15 and 16 at first measurment, respectively).     Intervention:   Internal Family Systems Therapy: Talked about feeling overwhelmed and the stressors on her. Discussed how she's been more aware of the tension between feeling overwhelmed and needing to do something. Talked about how take care of herself as she continues to notice this tension. Reviewed homework and identified successes and barriers to completion.    The following assessments were completed by patient for this visit:  PHQ9:   PHQ-9 SCORE 3/15/2022 3/18/2022 3/24/2022 4/1/2022 4/12/2022 4/18/2022 4/18/2022   PHQ-9 Total Score MyChart 15 (Moderately severe depression) 11 (Moderate depression) 12 (Moderate depression) 8 (Mild depression) 17 (Moderately severe depression) - 18 (Moderately severe depression)   PHQ-9 Total Score 15 11 12 8 17 18 18   Some encounter information is confidential and restricted. Go to Review Flowsheets activity to see all data.     GAD2:   CHAVO-2 3/15/2022 3/15/2022 3/24/2022 4/1/2022 4/18/2022 4/18/2022 4/18/2022   Feeling nervous, anxious, or on edge 3 3 2 2 3 3 3   Not being able to stop or control worrying 2 2 1 1 3 3 3   CHAVO-2 Total Score 5 5 3 3 6 6 6     PROMIS 10-Global Health (only subscores and total score):   PROMIS-10 Scores Only 12/14/2021 12/14/2021 3/15/2022 3/15/2022 3/15/2022 3/24/2022 4/1/2022   Global Mental Health Score 6 6 6 6 6 8 12   Global Physical Health Score 9 9 9 9 9 8 11   PROMIS TOTAL - SUBSCORES 15 15 15  15 15 16 23         ASSESSMENT: Current Emotional / Mental Status (status of significant symptoms):   Risk status (Self / Other harm or suicidal ideation)   Patient denies current fears or concerns for personal safety.   Patient denies current or recent suicidal ideation or behaviors.   Patient denies current or recent homicidal ideation or behaviors.   Patient denies current or recent self injurious behavior or ideation.   Patient denies other safety concerns.   Patient reports there has been no change in risk factors since their last session.     Patient reports there has been no change in protective factors since their last session.     A safety and risk management plan has been developed including: Patient has no change in safety concerns. Committed to safety and agreed to follow previously developed safety plan..  Patient consented to co-developed safety plan.  Safety and risk management plan was completed.  Patient agreed to use safety plan should any safety concerns arise.  A copy was given to the patient.     Appearance:   Appropriate    Eye Contact:   Good    Psychomotor Behavior: Normal    Attitude:   Cooperative    Orientation:   All   Speech    Rate / Production: Normal/ Responsive    Volume:  Normal    Mood:    Normal   Affect:    Appropriate    Thought Content:  Clear    Thought Form:  Coherent    Insight:    Good      Medication Review:   No changes to current psychiatric medication(s)     Medication Compliance:   Yes     Changes in Health Issues:   None reported     Chemical Use Review:   Substance Use: Chemical use reviewed, no active concerns identified Nothing used since August 2021.     Tobacco Use: No current tobacco use.      Diagnosis:  1. Bipolar 2 disorder (H)    2. Generalized anxiety disorder    3. Trauma and stressor-related disorder      Collateral Reports Completed:   Not Applicable    PLAN: (Patient Tasks / Therapist Tasks / Other)  File tax extension  Take walk  Get to the  "pool  Notice the sensation of \"I'm overwhelmed\" and how it comes up against \"I have to do this\"    In the future:  Create a schedule to follow  Get back to budgeting  Look in May for couples therapist        In the future- work with this part of yourself that spends the money- not wanting to miss out, as well as how you're hurt by family and money     In the future, return to: the part that puts up walls and the part that is sensitive to rejection      There has been demonstrated improvement in functioning while patient has been engaged in psychotherapy/psychological service- if withdrawn the patient would deteriorate and/or relapse.     MICAELA SLADE   2022                                                       ______________________________________________________________________    Individual Treatment Plan    Patient's Name: Randi Cleary  YOB: 1955    Date of Creation: 20  Date Treatment Plan Last Reviewed/Revised: 3/9/22    DSM5 Diagnoses: 296.89 Bipolar II Disorder Depressed, 300.02 (F41.1) Generalized Anxiety Disorder or Adjustment Disorders  309.89 (F43.8) Other Specified Trauma and Stressor Related Disorder  Psychosocial / Contextual Factors: Patient's entire family of origin has , she now has a sister-in-law and  as support.  Relationship with  is conflictual. She is recovering from surgeries  PROMIS (reviewed every 90 days): PROMIS-10 Scores  PROMIS 10-Global Health (only subscores and total score):   PROMIS-10 Scores Only 2021 2021 3/15/2022 3/15/2022 3/15/2022 3/24/2022 2022   Global Mental Health Score 6 6 6 6 6 8 12   Global Physical Health Score 9 9 9 9 9 8 11   PROMIS TOTAL - SUBSCORES 15 15 15 15 15 16 23          Referral / Collaboration:  Referral to another professional/service is not indicated at this time..    Anticipated number of session for this episode of care: 50  Anticipation frequency of session: " "Biweekly  Anticipated Duration of each session: 53 or more minutes due to intensity of trauma symptoms  Treatment plan will be reviewed in 90 days or when goals have been changed.   There has been demonstrated improvement in functioning while patient has been engaged in psychotherapy/psychological service- if withdrawn the patient would deteriorate and/or relapse.       MeasurableTreatment Goal(s) related to diagnosis / functional impairment(s)  Goal 1: Patient will \"jumpstart, getting going with the things I need to be doing around the house as far as picking up, doing things, trying to do something every day.  Also to lessen the animosity between me and my .\"    I will know I've met my goal when my shoulders are fixed and I can see.      Objective #A (Patient Action)    Patient will increase frequency of engaging her in ADLs.  Status: Continued - Date(s): 12/10/21, 3/9/22    Intervention(s)  Therapist will engage patient in CBT, specifically behavioral activation.    Objective #B  Patient will  track and record at least 5 pleasant exchanges with . Patient will be able to identify at least 5 positive traits about her  and how he relates to her.  Status: Continued - Date(s): 12/10/21, 3/9/22    Intervention(s)  Therapist will teach assertiveness skills and assign homework related to relationship interactions.    Objective #C  Patient will reduce level of depressive and anxious features as evidenced by reduction in score on her CHAVO-7 and PHQ-9 (scores of 15 and 16 at first measurment, respectively).  Status: Continued - Date(s): 12/10/21, 3/9/22    Intervention(s)  Therapist will engage patient in person-centered therapy and CBT.    Patient has reviewed and agreed to the above plan.      MICAELA SLADE  March 9, 2022                                                   Randi Cleary          SAFETY PLAN:  Step 1: Warning signs / cues (Thoughts, images, mood, situation, behavior) that a " "crisis may be developing:  ? Thoughts: \"I don't want to continue\" \"I am unwanted\"  ? Images: none  ? Thinking Processes: ruminating  ? Mood: anger  ? Behaviors: isolating/withdrawing , can't stop crying, not taking care of myself and not taking care of my responsibilities  ? Situations: small triggers, such as not being able to find something, or dropping something   Step 2: Coping strategies - Things I can do to take my mind off of my problems without contacting another person (relaxation technique, physical activity):  ? Distress Tolerance Strategies:  arts and crafts: drawing, play with my pet , listen to positive and upbeat music: any, change body temperature (ice pack/cold water)  and paced breathing/progressive muscle relaxation  ? Physical Activities: go for a walk, deep breathing and stretching   ? Focus on helpful thoughts:  \"You've been through this before, you can get through it again.\"  Step 3: People and social settings that provide distraction:                 Name: Carmen                            Name: Darien                           Name: Aleida       ? pool, shopping, Carmen's house, [employer]       Step 4: Remind myself of people and things that are important to me and worth living for:  Clifford Little Donna, post-COVID world, options of what could be in your future        Step 5: When I am in crisis, I can ask these people to help me use my safety plan:                 Name: Sidney  Step 6: Making the environment safe:   ? go to sleep/daydream  Step 7: Professionals or agencies I can contact during a crisis:  ? [facility] Daytime Number: [phone]  ? Suicide Prevention Lifeline: 3-569-807-IOVM (0268)  ? Crisis Text Line Service (available 24 hours a day, 7 days a week): Text [place] to 031206    Local Crisis Services: [facility] Crisis: [phone]     Call 911 or go to my nearest emergency department.       I helped develop this safety plan and agree to use it when needed.  I " have been given a copy of this plan.       Client signature _________________________________________________________________  Today s date:  11/24/2020  Adapted from Safety Plan Template 2008 Randi Poole and Robby Barba is reprinted with the express permission of the authors.  No portion of the Safety Plan Template may be reproduced without the express, written permission.  You can contact the authors at [email or web address] or [email or web address].

## 2022-04-22 DIAGNOSIS — R06.02 SOB (SHORTNESS OF BREATH): ICD-10-CM

## 2022-04-22 DIAGNOSIS — I27.20 PULMONARY HYPERTENSION (H): Primary | ICD-10-CM

## 2022-04-25 ENCOUNTER — TELEPHONE (OUTPATIENT)
Dept: PSYCHOLOGY | Facility: CLINIC | Age: 67
End: 2022-04-25
Payer: MEDICARE

## 2022-04-25 NOTE — TELEPHONE ENCOUNTER
Client did not join video visit. This provider called patient five minutes after scheduled appointment start time and left a message with [facility] appointment phone number if client was having difficulties getting connected. Let patient know that provider would wait until fifteen minutes after scheduled start time for video appointment, after which point in time they would have to reschedule.

## 2022-04-27 DIAGNOSIS — G95.20 CORD COMPRESSION (H): ICD-10-CM

## 2022-04-27 RX ORDER — HYDROCODONE BITARTRATE AND ACETAMINOPHEN 5; 325 MG/1; MG/1
1 TABLET ORAL 3 TIMES DAILY PRN
Qty: 70 TABLET | Refills: 0 | Status: SHIPPED | OUTPATIENT
Start: 2022-04-27 | End: 2022-05-24

## 2022-04-27 NOTE — TELEPHONE ENCOUNTER
Received call from patient requesting refill(s) of Norco    Last dispensed from pharmacy on 4/6/22    Patient's last office/virtual visit by prescribing provider on 4/6/22  Next office/virtual appointment scheduled for 6/22/22    Last urine drug screen date 12/2021  Current opioid agreement on file (completed within the last year) Yes Date of opioid agreement: 12/2021    E-prescribe to [facility] pharmacy  Pending Prescriptions:                       Disp   Refills    HYDROcodone-acetaminophen (NORCO) 5-325 M*70 tab*0            Sig: Take 1 tablet by mouth 3 times daily as needed           for breakthrough pain or moderate to severe pain           May fill/start 4/27/22

## 2022-04-28 ENCOUNTER — VIRTUAL VISIT (OUTPATIENT)
Dept: PSYCHOLOGY | Facility: CLINIC | Age: 67
End: 2022-04-28
Payer: MEDICARE

## 2022-04-28 DIAGNOSIS — F41.1 GENERALIZED ANXIETY DISORDER: ICD-10-CM

## 2022-04-28 DIAGNOSIS — F31.81 BIPOLAR 2 DISORDER (H): Primary | ICD-10-CM

## 2022-04-28 DIAGNOSIS — F43.9 TRAUMA AND STRESSOR-RELATED DISORDER: ICD-10-CM

## 2022-04-28 PROCEDURE — 90834 PSYTX W PT 45 MINUTES: CPT | Mod: 95 | Performed by: SOCIAL WORKER

## 2022-04-28 NOTE — PROGRESS NOTES
"[facility]                                     Progress Note    Patient Name: Randi Cleary  Date: 4/28/22         Service Type: Individual     Session Start Time: 3:03 PM  Session End Time: 3:48 PM     Session Length: 45 minutes    Session #: 106    Attendees: Client attended alone    Service Modality:  Video Visit:      Provider verified identity through the following two step process.  Patient provided:  Patient is known previously to provider    Telemedicine Visit: The patient's condition can be safely assessed and treated via synchronous audio and visual telemedicine encounter.      Reason for Telemedicine Visit: Services only offered telehealth    Originating Site (Patient Location): Patient's home    Distant Site (Provider Location): Provider Remote Setting- Home Office    Consent:  The patient/guardian has verbally consented to: the potential risks and benefits of telemedicine (video visit) versus in person care; bill my insurance or make self-payment for services provided; and responsibility for payment of non-covered services.     Patient would like the video invitation sent by:  My Chart    Mode of Communication:  Video Conference via Amwell    As the provider I attest to compliance with applicable laws and regulations related to telemedicine.    DATA  Extended Session (53+ minutes): No  Interactive Complexity: No  Crisis: No        Progress Since Last Session (Related to Symptoms / Goals / Homework):   Symptoms: Patient continues to feel stressed and feels like her life is \"out of control\"    Homework: Progress made  File tax extension -not done  Take walk -not done  Get to the pool -not done  Notice the sensation of \"I'm overwhelmed\" and how it comes up against \"I have to do this\"     Episode of Care Goals: Satisfactory progress - ACTION (Actively working towards change); Intervened by reinforcing change plan / affirming steps taken     Current / Ongoing Stressors and " Concerns:   Patient is currently socially isolated. She has a conflictual relationship with her .  She is getting minimal physical activity.  She had recent eye surgery and shoulder surgery.     Treatment Objective(s) Addressed in This Session:   Patient will increase frequency of engaging her in ADLs.  Patient will track and record at least 5 pleasant exchanges with . Patient will be able to identify at least 5 positive traits about her .  Patient will reduce level of depressive and anxious features as evidenced by reduction in score on her CHAVO-7 and PHQ-9 (scores of 15 and 16 at first measurment, respectively).     Intervention:   Internal Family Systems Therapy: Talked about challenges in her life lately. Discussed how it feels out of control at the moment, identifying big situations are causing big reactions for her. Talked about how to slow things down and feel less overwhelmed, starting with getting back into her bedroom and getting to the pool. Reviewed homework and identified successes and barriers to completion.    The following assessments were completed by patient for this visit:  PHQ9:   PHQ-9 SCORE 3/15/2022 3/18/2022 3/24/2022 4/1/2022 4/12/2022 4/18/2022 4/18/2022   PHQ-9 Total Score MyChart 15 (Moderately severe depression) 11 (Moderate depression) 12 (Moderate depression) 8 (Mild depression) 17 (Moderately severe depression) - 18 (Moderately severe depression)   PHQ-9 Total Score 15 11 12 8 17 18 18   Some encounter information is confidential and restricted. Go to Review Flowsheets activity to see all data.     GAD2:   CHAVO-2 3/15/2022 3/15/2022 3/24/2022 4/1/2022 4/18/2022 4/18/2022 4/18/2022   Feeling nervous, anxious, or on edge 3 3 2 2 3 3 3   Not being able to stop or control worrying 2 2 1 1 3 3 3   CHAVO-2 Total Score 5 5 3 3 6 6 6     PROMIS 10-Global Health (only subscores and total score):   PROMIS-10 Scores Only 12/14/2021 12/14/2021 3/15/2022 3/15/2022 3/15/2022  3/24/2022 4/1/2022   Global Mental Health Score 6 6 6 6 6 8 12   Global Physical Health Score 9 9 9 9 9 8 11   PROMIS TOTAL - SUBSCORES 15 15 15 15 15 16 23         ASSESSMENT: Current Emotional / Mental Status (status of significant symptoms):   Risk status (Self / Other harm or suicidal ideation)   Patient denies current fears or concerns for personal safety.   Patient denies current or recent suicidal ideation or behaviors.   Patient denies current or recent homicidal ideation or behaviors.   Patient denies current or recent self injurious behavior or ideation.   Patient denies other safety concerns.   Patient reports there has been no change in risk factors since their last session.     Patient reports there has been no change in protective factors since their last session.     A safety and risk management plan has been developed including: Patient has no change in safety concerns. Committed to safety and agreed to follow previously developed safety plan..  Patient consented to co-developed safety plan.  Safety and risk management plan was completed.  Patient agreed to use safety plan should any safety concerns arise.  A copy was given to the patient.     Appearance:   Appropriate    Eye Contact:   Good    Psychomotor Behavior: Normal    Attitude:   Cooperative    Orientation:   All   Speech    Rate / Production: Normal/ Responsive    Volume:  Normal    Mood:    Normal   Affect:    Appropriate    Thought Content:  Clear    Thought Form:  Coherent    Insight:    Good      Medication Review:   No changes to current psychiatric medication(s)     Medication Compliance:   Yes     Changes in Health Issues:   None reported     Chemical Use Review:   Substance Use: Chemical use reviewed, no active concerns identified Nothing used since August 2021.     Tobacco Use: No current tobacco use.      Diagnosis:  1. Bipolar 2 disorder (H)    2. Generalized anxiety disorder    3. Trauma and stressor-related disorder   "    Collateral Reports Completed:   Not Applicable    PLAN: (Patient Tasks / Therapist Tasks / Other)  File tax extension  Take walk  Get to the pool  Notice the sensation of \"I'm overwhelmed\" and how it comes up against \"I have to do this\"    In the future:  Create a schedule to follow  Get back to budgeting  Look in May for couples therapist        In the future- work with this part of yourself that spends the money- not wanting to miss out, as well as how you're hurt by family and money     In the future, return to: the part that puts up walls and the part that is sensitive to rejection      There has been demonstrated improvement in functioning while patient has been engaged in psychotherapy/psychological service- if withdrawn the patient would deteriorate and/or relapse.     MICAELA SLADE   2022                                                       ______________________________________________________________________    Individual Treatment Plan    Patient's Name: Randi Cleary  YOB: 1955    Date of Creation: 20  Date Treatment Plan Last Reviewed/Revised: 3/9/22    DSM5 Diagnoses: 296.89 Bipolar II Disorder Depressed, 300.02 (F41.1) Generalized Anxiety Disorder or Adjustment Disorders  309.89 (F43.8) Other Specified Trauma and Stressor Related Disorder  Psychosocial / Contextual Factors: Patient's entire family of origin has , she now has a sister-in-law and  as support.  Relationship with  is conflictual. She is recovering from surgeries  PROMIS (reviewed every 90 days): PROMIS-10 Scores  PROMIS 10-Global Health (only subscores and total score):   PROMIS-10 Scores Only 2021 2021 3/15/2022 3/15/2022 3/15/2022 3/24/2022 2022   Global Mental Health Score 6 6 6 6 6 8 12   Global Physical Health Score 9 9 9 9 9 8 11   PROMIS TOTAL - SUBSCORES 15 15 15 15 15 16 23          Referral / Collaboration:  Referral to another professional/service is " "not indicated at this time..    Anticipated number of session for this episode of care: 50  Anticipation frequency of session: Biweekly  Anticipated Duration of each session: 53 or more minutes due to intensity of trauma symptoms  Treatment plan will be reviewed in 90 days or when goals have been changed.   There has been demonstrated improvement in functioning while patient has been engaged in psychotherapy/psychological service- if withdrawn the patient would deteriorate and/or relapse.       MeasurableTreatment Goal(s) related to diagnosis / functional impairment(s)  Goal 1: Patient will \"jumpstart, getting going with the things I need to be doing around the house as far as picking up, doing things, trying to do something every day.  Also to lessen the animosity between me and my .\"    I will know I've met my goal when my shoulders are fixed and I can see.      Objective #A (Patient Action)    Patient will increase frequency of engaging her in ADLs.  Status: Continued - Date(s): 12/10/21, 3/9/22    Intervention(s)  Therapist will engage patient in CBT, specifically behavioral activation.    Objective #B  Patient will  track and record at least 5 pleasant exchanges with . Patient will be able to identify at least 5 positive traits about her  and how he relates to her.  Status: Continued - Date(s): 12/10/21, 3/9/22    Intervention(s)  Therapist will teach assertiveness skills and assign homework related to relationship interactions.    Objective #C  Patient will reduce level of depressive and anxious features as evidenced by reduction in score on her CHAVO-7 and PHQ-9 (scores of 15 and 16 at first measurment, respectively).  Status: Continued - Date(s): 12/10/21, 3/9/22    Intervention(s)  Therapist will engage patient in person-centered therapy and CBT.    Patient has reviewed and agreed to the above plan.      MICAELA SLADE  March 9, " "2022                                                   Randi Cleary          SAFETY PLAN:  Step 1: Warning signs / cues (Thoughts, images, mood, situation, behavior) that a crisis may be developing:  ? Thoughts: \"I don't want to continue\" \"I am unwanted\"  ? Images: none  ? Thinking Processes: ruminating  ? Mood: anger  ? Behaviors: isolating/withdrawing , can't stop crying, not taking care of myself and not taking care of my responsibilities  ? Situations: small triggers, such as not being able to find something, or dropping something   Step 2: Coping strategies - Things I can do to take my mind off of my problems without contacting another person (relaxation technique, physical activity):  ? Distress Tolerance Strategies:  arts and crafts: drawing, play with my pet , listen to positive and upbeat music: any, change body temperature (ice pack/cold water)  and paced breathing/progressive muscle relaxation  ? Physical Activities: go for a walk, deep breathing and stretching   ? Focus on helpful thoughts:  \"You've been through this before, you can get through it again.\"  Step 3: People and social settings that provide distraction:                 Name: Carmen                            Name: Darien                           Name: Aleida       ? pool, shopping, Carmen's house, [employer]       Step 4: Remind myself of people and things that are important to me and worth living for:  Clifford Little Donna, post-COVID world, options of what could be in your future        Step 5: When I am in crisis, I can ask these people to help me use my safety plan:                 Name: Sidney  Step 6: Making the environment safe:   ? go to sleep/daydream  Step 7: Professionals or agencies I can contact during a crisis:  ? [facility] Daytime Number: 709-884-0256  ? Suicide Prevention Lifeline: 1-397-128-YCHZ (0070)  ? Crisis Text Line Service (available 24 hours a day, 7 days a week): Text [place] to 799271    Local Crisis " Services: [facility] Crisis: [phone]     Call 911 or go to my nearest emergency department.       I helped develop this safety plan and agree to use it when needed.  I have been given a copy of this plan.       Client signature _________________________________________________________________  Today s date:  11/24/2020  Adapted from Safety Plan Template 2008 Randi Poole and Robby Barba is reprinted with the express permission of the authors.  No portion of the Safety Plan Template may be reproduced without the express, written permission.  You can contact the authors at [email or web address] or [email or web address].

## 2022-05-02 ENCOUNTER — VIRTUAL VISIT (OUTPATIENT)
Dept: PSYCHOLOGY | Facility: CLINIC | Age: 67
End: 2022-05-02
Payer: MEDICARE

## 2022-05-02 DIAGNOSIS — F43.9 TRAUMA AND STRESSOR-RELATED DISORDER: ICD-10-CM

## 2022-05-02 DIAGNOSIS — F41.1 GENERALIZED ANXIETY DISORDER: ICD-10-CM

## 2022-05-02 DIAGNOSIS — F31.81 BIPOLAR 2 DISORDER (H): Primary | ICD-10-CM

## 2022-05-02 PROCEDURE — 90834 PSYTX W PT 45 MINUTES: CPT | Mod: 95 | Performed by: SOCIAL WORKER

## 2022-05-02 ASSESSMENT — ANXIETY QUESTIONNAIRES
5. BEING SO RESTLESS THAT IT IS HARD TO SIT STILL: MORE THAN HALF THE DAYS
GAD7 TOTAL SCORE: 16
7. FEELING AFRAID AS IF SOMETHING AWFUL MIGHT HAPPEN: SEVERAL DAYS
GAD7 TOTAL SCORE: 16
GAD7 TOTAL SCORE: 16
7. FEELING AFRAID AS IF SOMETHING AWFUL MIGHT HAPPEN: SEVERAL DAYS
1. FEELING NERVOUS, ANXIOUS, OR ON EDGE: NEARLY EVERY DAY
GAD7 TOTAL SCORE: 16
7. FEELING AFRAID AS IF SOMETHING AWFUL MIGHT HAPPEN: SEVERAL DAYS
2. NOT BEING ABLE TO STOP OR CONTROL WORRYING: MORE THAN HALF THE DAYS
3. WORRYING TOO MUCH ABOUT DIFFERENT THINGS: MORE THAN HALF THE DAYS
GAD7 TOTAL SCORE: 16
2. NOT BEING ABLE TO STOP OR CONTROL WORRYING: MORE THAN HALF THE DAYS
1. FEELING NERVOUS, ANXIOUS, OR ON EDGE: NEARLY EVERY DAY
6. BECOMING EASILY ANNOYED OR IRRITABLE: NEARLY EVERY DAY
GAD7 TOTAL SCORE: 16
6. BECOMING EASILY ANNOYED OR IRRITABLE: NEARLY EVERY DAY
4. TROUBLE RELAXING: NEARLY EVERY DAY
4. TROUBLE RELAXING: NEARLY EVERY DAY
3. WORRYING TOO MUCH ABOUT DIFFERENT THINGS: MORE THAN HALF THE DAYS
5. BEING SO RESTLESS THAT IT IS HARD TO SIT STILL: MORE THAN HALF THE DAYS
7. FEELING AFRAID AS IF SOMETHING AWFUL MIGHT HAPPEN: SEVERAL DAYS

## 2022-05-02 ASSESSMENT — PATIENT HEALTH QUESTIONNAIRE - PHQ9
SUM OF ALL RESPONSES TO PHQ QUESTIONS 1-9: 20
SUM OF ALL RESPONSES TO PHQ QUESTIONS 1-9: 20
10. IF YOU CHECKED OFF ANY PROBLEMS, HOW DIFFICULT HAVE THESE PROBLEMS MADE IT FOR YOU TO DO YOUR WORK, TAKE CARE OF THINGS AT HOME, OR GET ALONG WITH OTHER PEOPLE: VERY DIFFICULT

## 2022-05-02 NOTE — PROGRESS NOTES
"[facility]                                     Progress Note    Patient Name: Randi Cleary  Date: 5/2/22         Service Type: Individual     Session Start Time: 10:33 AM  Session End Time: 11:19 AM     Session Length: 46 minutes    Session #: 107    Attendees: Client attended alone    Service Modality:  Video Visit:      Provider verified identity through the following two step process.  Patient provided:  Patient is known previously to provider    Telemedicine Visit: The patient's condition can be safely assessed and treated via synchronous audio and visual telemedicine encounter.      Reason for Telemedicine Visit: Services only offered telehealth    Originating Site (Patient Location): Patient's home    Distant Site (Provider Location): Provider Remote Setting- Home Office    Consent:  The patient/guardian has verbally consented to: the potential risks and benefits of telemedicine (video visit) versus in person care; bill my insurance or make self-payment for services provided; and responsibility for payment of non-covered services.     Patient would like the video invitation sent by:  My Chart    Mode of Communication:  Video Conference via Amwell    As the provider I attest to compliance with applicable laws and regulations related to telemedicine.    DATA  Extended Session (53+ minutes): No  Interactive Complexity: No  Crisis: No        Progress Since Last Session (Related to Symptoms / Goals / Homework):   Symptoms: Patient continues to feel stressed and feels like her life is \"out of control\"    Homework: Progress made  File tax extension -not done  Take walk  Get to the pool -not done  Notice the sensation of \"I'm overwhelmed\" and how it comes up against \"I have to do this\"    In the future:  Create a schedule to follow  Get back to budgeting  Look in May for couples therapist       Episode of Care Goals: Satisfactory progress - ACTION (Actively working towards change); " Intervened by reinforcing change plan / affirming steps taken     Current / Ongoing Stressors and Concerns:   Patient is currently socially isolated. She has a conflictual relationship with her .  She is getting minimal physical activity.  She had recent eye surgery and shoulder surgery.     Treatment Objective(s) Addressed in This Session:   Patient will increase frequency of engaging her in ADLs.  Patient will track and record at least 5 pleasant exchanges with . Patient will be able to identify at least 5 positive traits about her .  Patient will reduce level of depressive and anxious features as evidenced by reduction in score on her CHAVO-7 and PHQ-9 (scores of 15 and 16 at first measurment, respectively).     Intervention:   Internal Family Systems Therapy: Winnie shared concerns she had about her relationship with her . Provided updates of other areas of her life. Discussed her concerns around finances and her future. She's been journaling and reflecting on this, provided positive verbal feedback for this.    The following assessments were completed by patient for this visit:  PHQ9:   PHQ-9 SCORE 3/18/2022 3/24/2022 4/1/2022 4/12/2022 4/18/2022 4/18/2022 5/2/2022   PHQ-9 Total Score MyChart 11 (Moderate depression) 12 (Moderate depression) 8 (Mild depression) 17 (Moderately severe depression) - 18 (Moderately severe depression) 20 (Severe depression)   PHQ-9 Total Score 11 12 8 17 18 18 20   Some encounter information is confidential and restricted. Go to Review Flowsheets activity to see all data.     GAD2:   CHAVO-2 3/24/2022 4/1/2022 4/18/2022 4/18/2022 4/18/2022 5/2/2022 5/2/2022   Feeling nervous, anxious, or on edge 2 2 3 3 3 3 3   Not being able to stop or control worrying 1 1 3 3 3 2 2   CHAVO-2 Total Score 3 3 6 6 6 5 5     PROMIS 10-Global Health (only subscores and total score):   PROMIS-10 Scores Only 12/14/2021 12/14/2021 3/15/2022 3/15/2022 3/15/2022 3/24/2022 4/1/2022    Global Mental Health Score 6 6 6 6 6 8 12   Global Physical Health Score 9 9 9 9 9 8 11   PROMIS TOTAL - SUBSCORES 15 15 15 15 15 16 23         ASSESSMENT: Current Emotional / Mental Status (status of significant symptoms):   Risk status (Self / Other harm or suicidal ideation)   Patient denies current fears or concerns for personal safety.   Patient denies current or recent suicidal ideation or behaviors.   Patient denies current or recent homicidal ideation or behaviors.   Patient denies current or recent self injurious behavior or ideation.   Patient denies other safety concerns.   Patient reports there has been no change in risk factors since their last session.     Patient reports there has been no change in protective factors since their last session.     A safety and risk management plan has been developed including: Patient has no change in safety concerns. Committed to safety and agreed to follow previously developed safety plan..  Patient consented to co-developed safety plan.  Safety and risk management plan was completed.  Patient agreed to use safety plan should any safety concerns arise.  A copy was given to the patient.     Appearance:   Appropriate    Eye Contact:   Good    Psychomotor Behavior: Normal    Attitude:   Cooperative    Orientation:   All   Speech    Rate / Production: Normal/ Responsive    Volume:  Normal    Mood:    Normal   Affect:    Appropriate    Thought Content:  Clear    Thought Form:  Coherent    Insight:    Good      Medication Review:   No changes to current psychiatric medication(s)     Medication Compliance:   Yes     Changes in Health Issues:   None reported     Chemical Use Review:   Substance Use: Chemical use reviewed, no active concerns identified Nothing used since August 2021.     Tobacco Use: No current tobacco use.      Diagnosis:  1. Bipolar 2 disorder (H)    2. Generalized anxiety disorder    3. Trauma and stressor-related disorder      Collateral Reports  "Completed:   Not Applicable    PLAN: (Patient Tasks / Therapist Tasks / Other)  Set a weekly plan  Look into a couples therapist  Get to the pool    File tax extension  Notice the sensation of \"I'm overwhelmed\" and how it comes up against \"I have to do this\"    In the future:  Get back to budgeting        In the future- work with this part of yourself that spends the money- not wanting to miss out, as well as how you're hurt by family and money     In the future, return to: the part that puts up walls and the part that is sensitive to rejection      There has been demonstrated improvement in functioning while patient has been engaged in psychotherapy/psychological service- if withdrawn the patient would deteriorate and/or relapse.     MICAELA SLADE   May 2, 2022                                                       ______________________________________________________________________    Individual Treatment Plan    Patient's Name: Randi Cleary  YOB: 1955    Date of Creation: 20  Date Treatment Plan Last Reviewed/Revised: 3/9/22    DSM5 Diagnoses: 296.89 Bipolar II Disorder Depressed, 300.02 (F41.1) Generalized Anxiety Disorder or Adjustment Disorders  309.89 (F43.8) Other Specified Trauma and Stressor Related Disorder  Psychosocial / Contextual Factors: Patient's entire family of origin has , she now has a sister-in-law and  as support.  Relationship with  is conflictual. She is recovering from surgeries  PROMIS (reviewed every 90 days): PROMIS-10 Scores  PROMIS 10-Global Health (only subscores and total score):   PROMIS-10 Scores Only 2021 2021 3/15/2022 3/15/2022 3/15/2022 3/24/2022 2022   Global Mental Health Score 6 6 6 6 6 8 12   Global Physical Health Score 9 9 9 9 9 8 11   PROMIS TOTAL - SUBSCORES 15 15 15 15 15 16 23          Referral / Collaboration:  Referral to another professional/service is not indicated at this time..    Anticipated " "number of session for this episode of care: 50  Anticipation frequency of session: Biweekly  Anticipated Duration of each session: 53 or more minutes due to intensity of trauma symptoms  Treatment plan will be reviewed in 90 days or when goals have been changed.   There has been demonstrated improvement in functioning while patient has been engaged in psychotherapy/psychological service- if withdrawn the patient would deteriorate and/or relapse.       MeasurableTreatment Goal(s) related to diagnosis / functional impairment(s)  Goal 1: Patient will \"jumpstart, getting going with the things I need to be doing around the house as far as picking up, doing things, trying to do something every day.  Also to lessen the animosity between me and my .\"    I will know I've met my goal when my shoulders are fixed and I can see.      Objective #A (Patient Action)    Patient will increase frequency of engaging her in ADLs.  Status: Continued - Date(s): 12/10/21, 3/9/22    Intervention(s)  Therapist will engage patient in CBT, specifically behavioral activation.    Objective #B  Patient will  track and record at least 5 pleasant exchanges with . Patient will be able to identify at least 5 positive traits about her  and how he relates to her.  Status: Continued - Date(s): 12/10/21, 3/9/22    Intervention(s)  Therapist will teach assertiveness skills and assign homework related to relationship interactions.    Objective #C  Patient will reduce level of depressive and anxious features as evidenced by reduction in score on her CHAVO-7 and PHQ-9 (scores of 15 and 16 at first measurment, respectively).  Status: Continued - Date(s): 12/10/21, 3/9/22    Intervention(s)  Therapist will engage patient in person-centered therapy and CBT.    Patient has reviewed and agreed to the above plan.      MICAELA SLADE  March 9, 2022                                                   Randi Cleary          SAFETY PLAN:  Step " "1: Warning signs / cues (Thoughts, images, mood, situation, behavior) that a crisis may be developing:  ? Thoughts: \"I don't want to continue\" \"I am unwanted\"  ? Images: none  ? Thinking Processes: ruminating  ? Mood: anger  ? Behaviors: isolating/withdrawing , can't stop crying, not taking care of myself and not taking care of my responsibilities  ? Situations: small triggers, such as not being able to find something, or dropping something   Step 2: Coping strategies - Things I can do to take my mind off of my problems without contacting another person (relaxation technique, physical activity):  ? Distress Tolerance Strategies:  arts and crafts: drawing, play with my pet , listen to positive and upbeat music: any, change body temperature (ice pack/cold water)  and paced breathing/progressive muscle relaxation  ? Physical Activities: go for a walk, deep breathing and stretching   ? Focus on helpful thoughts:  \"You've been through this before, you can get through it again.\"  Step 3: People and social settings that provide distraction:                 Name: Carmen                            Name: Darien                           Name: Aleida       ? pool, shopping, Carmen's house, [employer]       Step 4: Remind myself of people and things that are important to me and worth living for:  Clifford Little Donna, post-COVID world, options of what could be in your future        Step 5: When I am in crisis, I can ask these people to help me use my safety plan:                 Name: Sidney  Step 6: Making the environment safe:   ? go to sleep/daydream  Step 7: Professionals or agencies I can contact during a crisis:  ? [facility] Daytime Number: [phone]  ? Suicide Prevention Lifeline: 3-676-227-TALK (3365)  ? Crisis Text Line Service (available 24 hours a day, 7 days a week): Text [place] to 035035    Local Crisis Services: [facility] Crisis: [phone]     Call 911 or go to my nearest emergency " department.       I helped develop this safety plan and agree to use it when needed.  I have been given a copy of this plan.       Client signature _________________________________________________________________  Today s date:  11/24/2020  Adapted from Safety Plan Template 2008 Randi Poole and Robby Barba is reprinted with the express permission of the authors.  No portion of the Safety Plan Template may be reproduced without the express, written permission.  You can contact the authors at [email or web address] or [email or web address].

## 2022-05-03 ASSESSMENT — ANXIETY QUESTIONNAIRES
GAD7 TOTAL SCORE: 16
GAD7 TOTAL SCORE: 16

## 2022-05-03 ASSESSMENT — PATIENT HEALTH QUESTIONNAIRE - PHQ9: SUM OF ALL RESPONSES TO PHQ QUESTIONS 1-9: 20

## 2022-05-06 ENCOUNTER — VIRTUAL VISIT (OUTPATIENT)
Dept: PSYCHOLOGY | Facility: CLINIC | Age: 67
End: 2022-05-06
Payer: MEDICARE

## 2022-05-06 DIAGNOSIS — F31.81 BIPOLAR 2 DISORDER (H): Primary | ICD-10-CM

## 2022-05-06 DIAGNOSIS — F43.9 TRAUMA AND STRESSOR-RELATED DISORDER: ICD-10-CM

## 2022-05-06 DIAGNOSIS — F41.1 GENERALIZED ANXIETY DISORDER: ICD-10-CM

## 2022-05-06 PROCEDURE — 90834 PSYTX W PT 45 MINUTES: CPT | Mod: 95 | Performed by: SOCIAL WORKER

## 2022-05-06 ASSESSMENT — PATIENT HEALTH QUESTIONNAIRE - PHQ9
SUM OF ALL RESPONSES TO PHQ QUESTIONS 1-9: 18
SUM OF ALL RESPONSES TO PHQ QUESTIONS 1-9: 18
10. IF YOU CHECKED OFF ANY PROBLEMS, HOW DIFFICULT HAVE THESE PROBLEMS MADE IT FOR YOU TO DO YOUR WORK, TAKE CARE OF THINGS AT HOME, OR GET ALONG WITH OTHER PEOPLE: VERY DIFFICULT

## 2022-05-06 NOTE — CONFIDENTIAL NOTE
"[facility]                                     Progress Note    Patient Name: Randi Cleary  Date: 5/6/22         Service Type: Individual     Session Start Time: 1:30 PM  Session End Time: 2:19 PM     Session Length: 49 minutes    Session #: 108    Attendees: Client attended alone    Service Modality:  Video Visit:      Provider verified identity through the following two step process.  Patient provided:  Patient is known previously to provider    Telemedicine Visit: The patient's condition can be safely assessed and treated via synchronous audio and visual telemedicine encounter.      Reason for Telemedicine Visit: Services only offered telehealth    Originating Site (Patient Location): Patient's home    Distant Site (Provider Location): Provider Remote Setting- Home Office    Consent:  The patient/guardian has verbally consented to: the potential risks and benefits of telemedicine (video visit) versus in person care; bill my insurance or make self-payment for services provided; and responsibility for payment of non-covered services.     Patient would like the video invitation sent by:  My Chart    Mode of Communication:  Video Conference via Amwell    As the provider I attest to compliance with applicable laws and regulations related to telemedicine.    DATA  Extended Session (53+ minutes): No  Interactive Complexity: No  Crisis: No        Progress Since Last Session (Related to Symptoms / Goals / Homework):   Symptoms: No change since prior session    Homework: Progress made  Set a weekly plan -done  Look into a couples therapist -not done  Get to the pool -not done, but worked with [employer] [facility] to set this up for next week    File tax extension -done  Notice the sensation of \"I'm overwhelmed\" and how it comes up against \"I have to do this\"     Episode of Care Goals: Satisfactory progress - ACTION (Actively working towards change); Intervened by reinforcing change plan / affirming " steps taken     Current / Ongoing Stressors and Concerns:   Patient is currently socially isolated. She has a conflictual relationship with her .  She is getting minimal physical activity.  She had recent eye surgery and shoulder surgery.     Treatment Objective(s) Addressed in This Session:   Patient will increase frequency of engaging her in ADLs.  Patient will track and record at least 5 pleasant exchanges with . Patient will be able to identify at least 5 positive traits about her .  Patient will reduce level of depressive and anxious features as evidenced by reduction in score on her CHAVO-7 and PHQ-9 (scores of 15 and 16 at first measurment, respectively).     Intervention:   Internal Family Systems Therapy:  Discussed challenges for the week, including some financial challenges that are impacting her relationship with her spouse. Also discussed some tension within her friend group and some regrets she had about making a passive aggressive comment. Talked about how to care for herself from here.    The following assessments were completed by patient for this visit:  PHQ9:   PHQ-9 SCORE 3/24/2022 4/1/2022 4/12/2022 4/18/2022 4/18/2022 5/2/2022 5/6/2022   PHQ-9 Total Score MyChart 12 (Moderate depression) 8 (Mild depression) 17 (Moderately severe depression) - 18 (Moderately severe depression) 20 (Severe depression) 18 (Moderately severe depression)   PHQ-9 Total Score 12 8 17 18 18 20 18   Some encounter information is confidential and restricted. Go to Review Flowsheets activity to see all data.     GAD2:   CHAVO-2 3/24/2022 4/1/2022 4/18/2022 4/18/2022 4/18/2022 5/2/2022 5/2/2022   Feeling nervous, anxious, or on edge 2 2 3 3 3 3 3   Not being able to stop or control worrying 1 1 3 3 3 2 2   CHAVO-2 Total Score 3 3 6 6 6 5 5     PROMIS 10-Global Health (only subscores and total score):   PROMIS-10 Scores Only 12/14/2021 12/14/2021 3/15/2022 3/15/2022 3/15/2022 3/24/2022 4/1/2022   Global Mental  Health Score 6 6 6 6 6 8 12   Global Physical Health Score 9 9 9 9 9 8 11   PROMIS TOTAL - SUBSCORES 15 15 15 15 15 16 23         ASSESSMENT: Current Emotional / Mental Status (status of significant symptoms):   Risk status (Self / Other harm or suicidal ideation)   Patient denies current fears or concerns for personal safety.   Patient denies current or recent suicidal ideation or behaviors.   Patient denies current or recent homicidal ideation or behaviors.   Patient denies current or recent self injurious behavior or ideation.   Patient denies other safety concerns.   Patient reports there has been no change in risk factors since their last session.     Patient reports there has been no change in protective factors since their last session.     A safety and risk management plan has been developed including: Patient has no change in safety concerns. Committed to safety and agreed to follow previously developed safety plan..  Patient consented to co-developed safety plan.  Safety and risk management plan was completed.  Patient agreed to use safety plan should any safety concerns arise.  A copy was given to the patient.     Appearance:   Appropriate    Eye Contact:   Good    Psychomotor Behavior: Normal    Attitude:   Cooperative    Orientation:   All   Speech    Rate / Production: Normal/ Responsive    Volume:  Normal    Mood:    Normal   Affect:    Appropriate    Thought Content:  Clear    Thought Form:  Coherent    Insight:    Good      Medication Review:   No changes to current psychiatric medication(s)     Medication Compliance:   Yes     Changes in Health Issues:   None reported     Chemical Use Review:   Substance Use: Chemical use reviewed, no active concerns identified Nothing used since August 2021.     Tobacco Use: No current tobacco use.      Diagnosis:  1. Bipolar 2 disorder (H)    2. Generalized anxiety disorder    3. Trauma and stressor-related disorder      Collateral Reports Completed:   Not  "Applicable    PLAN: (Patient Tasks / Therapist Tasks / Other)  Tomorrow enjoy your company, journaling in advance to prepare  Set a weekly plan  Look into a couples therapist  Get to the pool    Notice the sensation of \"I'm overwhelmed\" and how it comes up against \"I have to do this\"    In the future:  Get back to budgeting        In the future- work with this part of yourself that spends the money- not wanting to miss out, as well as how you're hurt by family and money     In the future, return to: the part that puts up walls and the part that is sensitive to rejection      There has been demonstrated improvement in functioning while patient has been engaged in psychotherapy/psychological service- if withdrawn the patient would deteriorate and/or relapse.     MICAELA SLADE   May 6, 2022                                                       ______________________________________________________________________    Individual Treatment Plan    Patient's Name: Randi Cleary  YOB: 1955    Date of Creation: 20  Date Treatment Plan Last Reviewed/Revised: 3/9/22    DSM5 Diagnoses: 296.89 Bipolar II Disorder Depressed, 300.02 (F41.1) Generalized Anxiety Disorder or Adjustment Disorders  309.89 (F43.8) Other Specified Trauma and Stressor Related Disorder  Psychosocial / Contextual Factors: Patient's entire family of origin has , she now has a sister-in-law and  as support.  Relationship with  is conflictual. She is recovering from surgeries  PROMIS (reviewed every 90 days): PROMIS-10 Scores  PROMIS 10-Global Health (only subscores and total score):   PROMIS-10 Scores Only 2021 2021 3/15/2022 3/15/2022 3/15/2022 3/24/2022 2022   Global Mental Health Score 6 6 6 6 6 8 12   Global Physical Health Score 9 9 9 9 9 8 11   PROMIS TOTAL - SUBSCORES 15 15 15 15 15 16 23          Referral / Collaboration:  Referral to another professional/service is not indicated at this " "time..    Anticipated number of session for this episode of care: 50  Anticipation frequency of session: Biweekly  Anticipated Duration of each session: 53 or more minutes due to intensity of trauma symptoms  Treatment plan will be reviewed in 90 days or when goals have been changed.   There has been demonstrated improvement in functioning while patient has been engaged in psychotherapy/psychological service- if withdrawn the patient would deteriorate and/or relapse.       MeasurableTreatment Goal(s) related to diagnosis / functional impairment(s)  Goal 1: Patient will \"jumpstart, getting going with the things I need to be doing around the house as far as picking up, doing things, trying to do something every day.  Also to lessen the animosity between me and my .\"    I will know I've met my goal when my shoulders are fixed and I can see.      Objective #A (Patient Action)    Patient will increase frequency of engaging her in ADLs.  Status: Continued - Date(s): 12/10/21, 3/9/22    Intervention(s)  Therapist will engage patient in CBT, specifically behavioral activation.    Objective #B  Patient will  track and record at least 5 pleasant exchanges with . Patient will be able to identify at least 5 positive traits about her  and how he relates to her.  Status: Continued - Date(s): 12/10/21, 3/9/22    Intervention(s)  Therapist will teach assertiveness skills and assign homework related to relationship interactions.    Objective #C  Patient will reduce level of depressive and anxious features as evidenced by reduction in score on her CHAVO-7 and PHQ-9 (scores of 15 and 16 at first measurment, respectively).  Status: Continued - Date(s): 12/10/21, 3/9/22    Intervention(s)  Therapist will engage patient in person-centered therapy and CBT.    Patient has reviewed and agreed to the above plan.      MICAELA SLADE  March 9, 2022                                                   Randi Morgan" "Evin          SAFETY PLAN:  Step 1: Warning signs / cues (Thoughts, images, mood, situation, behavior) that a crisis may be developing:  ? Thoughts: \"I don't want to continue\" \"I am unwanted\"  ? Images: none  ? Thinking Processes: ruminating  ? Mood: anger  ? Behaviors: isolating/withdrawing , can't stop crying, not taking care of myself and not taking care of my responsibilities  ? Situations: small triggers, such as not being able to find something, or dropping something   Step 2: Coping strategies - Things I can do to take my mind off of my problems without contacting another person (relaxation technique, physical activity):  ? Distress Tolerance Strategies:  arts and crafts: drawing, play with my pet , listen to positive and upbeat music: any, change body temperature (ice pack/cold water)  and paced breathing/progressive muscle relaxation  ? Physical Activities: go for a walk, deep breathing and stretching   ? Focus on helpful thoughts:  \"You've been through this before, you can get through it again.\"  Step 3: People and social settings that provide distraction:                 Name: Carmen                            Name: Darien                           Name: Aleida       ? pool, shopping, Carmen's house, [employer]       Step 4: Remind myself of people and things that are important to me and worth living for:  Clifford Ltitle Donna, post-COVID world, options of what could be in your future        Step 5: When I am in crisis, I can ask these people to help me use my safety plan:                 Name: Sidney  Step 6: Making the environment safe:   ? go to sleep/daydream  Step 7: Professionals or agencies I can contact during a crisis:  ? [facility] Daytime Number: [phone]  ? Suicide Prevention Lifeline: 6-003-312-TALK (5044)  ? Crisis Text Line Service (available 24 hours a day, 7 days a week): Text [place] to 012358    Local Crisis Services: [facility] Crisis: [phone]     Call 911 or go to " my nearest emergency department.       I helped develop this safety plan and agree to use it when needed.  I have been given a copy of this plan.       Client signature _________________________________________________________________  Today s date:  11/24/2020  Adapted from Safety Plan Template 2008 Randi Poole and Robby Barba is reprinted with the express permission of the authors.  No portion of the Safety Plan Template may be reproduced without the express, written permission.  You can contact the authors at [email or web address] or [email or web address].

## 2022-05-07 ASSESSMENT — PATIENT HEALTH QUESTIONNAIRE - PHQ9: SUM OF ALL RESPONSES TO PHQ QUESTIONS 1-9: 18

## 2022-05-10 ENCOUNTER — VIRTUAL VISIT (OUTPATIENT)
Dept: PSYCHOLOGY | Facility: CLINIC | Age: 67
End: 2022-05-10
Payer: MEDICARE

## 2022-05-10 DIAGNOSIS — F43.9 TRAUMA AND STRESSOR-RELATED DISORDER: ICD-10-CM

## 2022-05-10 DIAGNOSIS — F31.81 BIPOLAR 2 DISORDER (H): Primary | ICD-10-CM

## 2022-05-10 DIAGNOSIS — F41.1 GENERALIZED ANXIETY DISORDER: ICD-10-CM

## 2022-05-10 PROCEDURE — 90837 PSYTX W PT 60 MINUTES: CPT | Mod: 95 | Performed by: SOCIAL WORKER

## 2022-05-10 ASSESSMENT — PATIENT HEALTH QUESTIONNAIRE - PHQ9
10. IF YOU CHECKED OFF ANY PROBLEMS, HOW DIFFICULT HAVE THESE PROBLEMS MADE IT FOR YOU TO DO YOUR WORK, TAKE CARE OF THINGS AT HOME, OR GET ALONG WITH OTHER PEOPLE: VERY DIFFICULT
SUM OF ALL RESPONSES TO PHQ QUESTIONS 1-9: 18
SUM OF ALL RESPONSES TO PHQ QUESTIONS 1-9: 18

## 2022-05-10 ASSESSMENT — ANXIETY QUESTIONNAIRES
6. BECOMING EASILY ANNOYED OR IRRITABLE: NEARLY EVERY DAY
4. TROUBLE RELAXING: NEARLY EVERY DAY
3. WORRYING TOO MUCH ABOUT DIFFERENT THINGS: MORE THAN HALF THE DAYS
8. IF YOU CHECKED OFF ANY PROBLEMS, HOW DIFFICULT HAVE THESE MADE IT FOR YOU TO DO YOUR WORK, TAKE CARE OF THINGS AT HOME, OR GET ALONG WITH OTHER PEOPLE?: EXTREMELY DIFFICULT
1. FEELING NERVOUS, ANXIOUS, OR ON EDGE: NEARLY EVERY DAY
5. BEING SO RESTLESS THAT IT IS HARD TO SIT STILL: MORE THAN HALF THE DAYS
7. FEELING AFRAID AS IF SOMETHING AWFUL MIGHT HAPPEN: NEARLY EVERY DAY
7. FEELING AFRAID AS IF SOMETHING AWFUL MIGHT HAPPEN: NEARLY EVERY DAY
8. IF YOU CHECKED OFF ANY PROBLEMS, HOW DIFFICULT HAVE THESE MADE IT FOR YOU TO DO YOUR WORK, TAKE CARE OF THINGS AT HOME, OR GET ALONG WITH OTHER PEOPLE?: EXTREMELY DIFFICULT
GAD7 TOTAL SCORE: 18
7. FEELING AFRAID AS IF SOMETHING AWFUL MIGHT HAPPEN: NEARLY EVERY DAY
GAD7 TOTAL SCORE: 18
3. WORRYING TOO MUCH ABOUT DIFFERENT THINGS: MORE THAN HALF THE DAYS
7. FEELING AFRAID AS IF SOMETHING AWFUL MIGHT HAPPEN: NEARLY EVERY DAY
5. BEING SO RESTLESS THAT IT IS HARD TO SIT STILL: MORE THAN HALF THE DAYS
GAD7 TOTAL SCORE: 18
8. IF YOU CHECKED OFF ANY PROBLEMS, HOW DIFFICULT HAVE THESE MADE IT FOR YOU TO DO YOUR WORK, TAKE CARE OF THINGS AT HOME, OR GET ALONG WITH OTHER PEOPLE?: EXTREMELY DIFFICULT
GAD7 TOTAL SCORE: 18
3. WORRYING TOO MUCH ABOUT DIFFERENT THINGS: MORE THAN HALF THE DAYS
2. NOT BEING ABLE TO STOP OR CONTROL WORRYING: MORE THAN HALF THE DAYS
2. NOT BEING ABLE TO STOP OR CONTROL WORRYING: MORE THAN HALF THE DAYS
GAD7 TOTAL SCORE: 18
4. TROUBLE RELAXING: NEARLY EVERY DAY
GAD7 TOTAL SCORE: 18
5. BEING SO RESTLESS THAT IT IS HARD TO SIT STILL: MORE THAN HALF THE DAYS
1. FEELING NERVOUS, ANXIOUS, OR ON EDGE: NEARLY EVERY DAY
6. BECOMING EASILY ANNOYED OR IRRITABLE: NEARLY EVERY DAY
GAD7 TOTAL SCORE: 18
4. TROUBLE RELAXING: NEARLY EVERY DAY
1. FEELING NERVOUS, ANXIOUS, OR ON EDGE: NEARLY EVERY DAY
7. FEELING AFRAID AS IF SOMETHING AWFUL MIGHT HAPPEN: NEARLY EVERY DAY
7. FEELING AFRAID AS IF SOMETHING AWFUL MIGHT HAPPEN: NEARLY EVERY DAY
2. NOT BEING ABLE TO STOP OR CONTROL WORRYING: MORE THAN HALF THE DAYS
6. BECOMING EASILY ANNOYED OR IRRITABLE: NEARLY EVERY DAY

## 2022-05-10 NOTE — CONFIDENTIAL NOTE
[facility]                                     Progress Note    Patient Name: Randi Cleary  Date: 5/10/22         Service Type: Individual     Session Start Time: 10:32 AM  Session End Time: 11:25 AM     Session Length: 53 minutes    Session #: 109    Attendees: Client attended alone    Service Modality:  Video Visit:      Provider verified identity through the following two step process.  Patient provided:  Patient is known previously to provider    Telemedicine Visit: The patient's condition can be safely assessed and treated via synchronous audio and visual telemedicine encounter.      Reason for Telemedicine Visit: Services only offered telehealth    Originating Site (Patient Location): Patient's home    Distant Site (Provider Location): Provider Remote Setting- Home Office    Consent:  The patient/guardian has verbally consented to: the potential risks and benefits of telemedicine (video visit) versus in person care; bill my insurance or make self-payment for services provided; and responsibility for payment of non-covered services.     Patient would like the video invitation sent by:  My Chart    Mode of Communication:  Video Conference via Amwell    As the provider I attest to compliance with applicable laws and regulations related to telemedicine.    DATA  Extended Session (53+ minutes): PROLONGED SERVICE IN THE OUTPATIENT SETTING REQUIRING DIRECT (FACE-TO-FACE) PATIENT CONTACT BEYOND THE USUAL SERVICE:    - Treatment protocol required additional time to complete, due to the nature of diagnosis being treated.  See Interventions section for details  Interactive Complexity: No  Crisis: No        Progress Since Last Session (Related to Symptoms / Goals / Homework):   Symptoms: Continuing to struggle, especially within her relationship    Homework: Progress made  Tomorrow enjoy your company, journaling in advance to prepare -done  Set a weekly plan -not done  Look into a couples  "therapist -not done  Get to the pool -not done    Notice the sensation of \"I'm overwhelmed\" and how it comes up against \"I have to do this\"     Episode of Care Goals: Satisfactory progress - ACTION (Actively working towards change); Intervened by reinforcing change plan / affirming steps taken     Current / Ongoing Stressors and Concerns:   Patient is currently socially isolated. She has a conflictual relationship with her .  She is getting minimal physical activity.  She had recent eye surgery and shoulder surgery.     Treatment Objective(s) Addressed in This Session:   Patient will increase frequency of engaging her in ADLs.  Patient will track and record at least 5 pleasant exchanges with . Patient will be able to identify at least 5 positive traits about her .  Patient will reduce level of depressive and anxious features as evidenced by reduction in score on her CHAVO-7 and PHQ-9 (scores of 15 and 16 at first measurment, respectively).     Intervention:   Internal Family Systems Therapy: Patient shared happiness and excitement about gardening. Talked about challenges within her relationship and how the pattern within their relationship continues. Talked about how to make a change. Reviewed homework and identified successes and barriers to completion.    The following assessments were completed by patient for this visit:  PHQ9:   PHQ-9 SCORE 4/1/2022 4/12/2022 4/18/2022 4/18/2022 5/2/2022 5/6/2022 5/10/2022   PHQ-9 Total Score MyChart 8 (Mild depression) 17 (Moderately severe depression) - 18 (Moderately severe depression) 20 (Severe depression) 18 (Moderately severe depression) 18 (Moderately severe depression)   PHQ-9 Total Score 8 17 18 18 20 18 18   Some encounter information is confidential and restricted. Go to Review Flowsheets activity to see all data.     GAD2:   CHAVO-2 4/18/2022 4/18/2022 5/2/2022 5/2/2022 5/10/2022 5/10/2022 5/10/2022   Feeling nervous, anxious, or on edge 3 3 3 3 3 3 " 3   Not being able to stop or control worrying 3 3 2 2 3 3 3   CHAVO-2 Total Score 6 6 5 5 6 6 6     PROMIS 10-Global Health (only subscores and total score):   PROMIS-10 Scores Only 12/14/2021 12/14/2021 3/15/2022 3/15/2022 3/15/2022 3/24/2022 4/1/2022   Global Mental Health Score 6 6 6 6 6 8 12   Global Physical Health Score 9 9 9 9 9 8 11   PROMIS TOTAL - SUBSCORES 15 15 15 15 15 16 23         ASSESSMENT: Current Emotional / Mental Status (status of significant symptoms):   Risk status (Self / Other harm or suicidal ideation)   Patient denies current fears or concerns for personal safety.   Patient denies current or recent suicidal ideation or behaviors.   Patient denies current or recent homicidal ideation or behaviors.   Patient denies current or recent self injurious behavior or ideation.   Patient denies other safety concerns.   Patient reports there has been no change in risk factors since their last session.     Patient reports there has been no change in protective factors since their last session.     A safety and risk management plan has been developed including: Patient has no change in safety concerns. Committed to safety and agreed to follow previously developed safety plan..  Patient consented to co-developed safety plan.  Safety and risk management plan was completed.  Patient agreed to use safety plan should any safety concerns arise.  A copy was given to the patient.     Appearance:   Appropriate    Eye Contact:   Good    Psychomotor Behavior: Normal    Attitude:   Cooperative    Orientation:   All   Speech    Rate / Production: Normal/ Responsive    Volume:  Normal    Mood:    Normal   Affect:    Appropriate    Thought Content:  Clear    Thought Form:  Coherent    Insight:    Good      Medication Review:   No changes to current psychiatric medication(s)     Medication Compliance:   Yes     Changes in Health Issues:   None reported     Chemical Use Review:   Substance Use: Chemical use reviewed, no  "active concerns identified Nothing used since 2021.     Tobacco Use: No current tobacco use.      Diagnosis:  1. Bipolar 2 disorder (H)    2. Generalized anxiety disorder    3. Trauma and stressor-related disorder      Collateral Reports Completed:   Not Applicable    PLAN: (Patient Tasks / Therapist Tasks / Other)  Tomorrow enjoy your company, journaling in advance to prepare  Set a weekly plan  Look into a couples therapist  Get to the pool    Notice the sensation of \"I'm overwhelmed\" and how it comes up against \"I have to do this\"    In the future:  Get back to budgeting        In the future- work with this part of yourself that spends the money- not wanting to miss out, as well as how you're hurt by family and money     In the future, return to: the part that puts up walls and the part that is sensitive to rejection      There has been demonstrated improvement in functioning while patient has been engaged in psychotherapy/psychological service- if withdrawn the patient would deteriorate and/or relapse.     MICAELA SLADE   May 10, 2022                                                       ______________________________________________________________________    Individual Treatment Plan    Patient's Name: Randi Cleary  YOB: 1955    Date of Creation: 20  Date Treatment Plan Last Reviewed/Revised: 3/9/22    DSM5 Diagnoses: 296.89 Bipolar II Disorder Depressed, 300.02 (F41.1) Generalized Anxiety Disorder or Adjustment Disorders  309.89 (F43.8) Other Specified Trauma and Stressor Related Disorder  Psychosocial / Contextual Factors: Patient's entire family of origin has , she now has a sister-in-law and  as support.  Relationship with  is conflictual. She is recovering from surgeries  PROMIS (reviewed every 90 days): PROMIS-10 Scores  PROMIS 10-Global Health (only subscores and total score):   PROMIS-10 Scores Only 2021 2021 3/15/2022 3/15/2022 " "3/15/2022 3/24/2022 4/1/2022   Global Mental Health Score 6 6 6 6 6 8 12   Global Physical Health Score 9 9 9 9 9 8 11   PROMIS TOTAL - SUBSCORES 15 15 15 15 15 16 23          Referral / Collaboration:  Referral to another professional/service is not indicated at this time..    Anticipated number of session for this episode of care: 50  Anticipation frequency of session: Biweekly  Anticipated Duration of each session: 53 or more minutes due to intensity of trauma symptoms  Treatment plan will be reviewed in 90 days or when goals have been changed.   There has been demonstrated improvement in functioning while patient has been engaged in psychotherapy/psychological service- if withdrawn the patient would deteriorate and/or relapse.       MeasurableTreatment Goal(s) related to diagnosis / functional impairment(s)  Goal 1: Patient will \"jumpstart, getting going with the things I need to be doing around the house as far as picking up, doing things, trying to do something every day.  Also to lessen the animosity between me and my .\"    I will know I've met my goal when my shoulders are fixed and I can see.      Objective #A (Patient Action)    Patient will increase frequency of engaging her in ADLs.  Status: Continued - Date(s): 12/10/21, 3/9/22    Intervention(s)  Therapist will engage patient in CBT, specifically behavioral activation.    Objective #B  Patient will  track and record at least 5 pleasant exchanges with . Patient will be able to identify at least 5 positive traits about her  and how he relates to her.  Status: Continued - Date(s): 12/10/21, 3/9/22    Intervention(s)  Therapist will teach assertiveness skills and assign homework related to relationship interactions.    Objective #C  Patient will reduce level of depressive and anxious features as evidenced by reduction in score on her CHAVO-7 and PHQ-9 (scores of 15 and 16 at first measurment, respectively).  Status: Continued - Date(s): " "12/10/21, 3/9/22    Intervention(s)  Therapist will engage patient in person-centered therapy and CBT.    Patient has reviewed and agreed to the above plan.      MICAELA SLADE  March 9, 2022                                                   Randi Cleary          SAFETY PLAN:  Step 1: Warning signs / cues (Thoughts, images, mood, situation, behavior) that a crisis may be developing:  ? Thoughts: \"I don't want to continue\" \"I am unwanted\"  ? Images: none  ? Thinking Processes: ruminating  ? Mood: anger  ? Behaviors: isolating/withdrawing , can't stop crying, not taking care of myself and not taking care of my responsibilities  ? Situations: small triggers, such as not being able to find something, or dropping something   Step 2: Coping strategies - Things I can do to take my mind off of my problems without contacting another person (relaxation technique, physical activity):  ? Distress Tolerance Strategies:  arts and crafts: drawing, play with my pet , listen to positive and upbeat music: any, change body temperature (ice pack/cold water)  and paced breathing/progressive muscle relaxation  ? Physical Activities: go for a walk, deep breathing and stretching   ? Focus on helpful thoughts:  \"You've been through this before, you can get through it again.\"  Step 3: People and social settings that provide distraction:                 Name: Carmen                            Name: Darien                           Name: Aleida       ? pool, shopping, Carmen's house, [employer]       Step 4: Remind myself of people and things that are important to me and worth living for:  Clifford Little Donna, post-COVID world, options of what could be in your future        Step 5: When I am in crisis, I can ask these people to help me use my safety plan:                 Name: Sidney  Step 6: Making the environment safe:   ? go to sleep/daydream  Step 7: Professionals or agencies I can contact during a crisis:  ? [facility] " Daytime Number: [phone]  ? Suicide Prevention Lifeline: 7-513-123-TALK (8508)  ? Crisis Text Line Service (available 24 hours a day, 7 days a week): Text [place] to 210263    Local Crisis Services: [facility] Crisis: [phone]     Call 911 or go to my nearest emergency department.       I helped develop this safety plan and agree to use it when needed.  I have been given a copy of this plan.       Client signature _________________________________________________________________  Today s date:  11/24/2020  Adapted from Safety Plan Template 2008 Randi Poole and Robby Barba is reprinted with the express permission of the authors.  No portion of the Safety Plan Template may be reproduced without the express, written permission.  You can contact the authors at [email or web address] or [email or web address].

## 2022-05-11 ASSESSMENT — ANXIETY QUESTIONNAIRES
GAD7 TOTAL SCORE: 18

## 2022-05-11 ASSESSMENT — PATIENT HEALTH QUESTIONNAIRE - PHQ9: SUM OF ALL RESPONSES TO PHQ QUESTIONS 1-9: 18

## 2022-05-12 ENCOUNTER — VIRTUAL VISIT (OUTPATIENT)
Dept: PSYCHOLOGY | Facility: CLINIC | Age: 67
End: 2022-05-12
Payer: MEDICARE

## 2022-05-12 DIAGNOSIS — F43.9 TRAUMA AND STRESSOR-RELATED DISORDER: ICD-10-CM

## 2022-05-12 DIAGNOSIS — F41.1 GENERALIZED ANXIETY DISORDER: ICD-10-CM

## 2022-05-12 DIAGNOSIS — F31.81 BIPOLAR 2 DISORDER (H): Primary | ICD-10-CM

## 2022-05-12 PROCEDURE — 90837 PSYTX W PT 60 MINUTES: CPT | Mod: 95 | Performed by: SOCIAL WORKER

## 2022-05-12 NOTE — CONFIDENTIAL NOTE
[facility]                                     Progress Note    Patient Name: Randi Cleary  Date: 5/12/22         Service Type: Individual     Session Start Time: 9:32 AM  Session End Time: 10:27 AM     Session Length: 55 minutes    Session #: 110    Attendees: Client attended alone    Service Modality:  Video Visit:      Provider verified identity through the following two step process.  Patient provided:  Patient is known previously to provider    Telemedicine Visit: The patient's condition can be safely assessed and treated via synchronous audio and visual telemedicine encounter.      Reason for Telemedicine Visit: Services only offered telehealth    Originating Site (Patient Location): Patient's home    Distant Site (Provider Location): Provider Remote Setting- Home Office    Consent:  The patient/guardian has verbally consented to: the potential risks and benefits of telemedicine (video visit) versus in person care; bill my insurance or make self-payment for services provided; and responsibility for payment of non-covered services.     Patient would like the video invitation sent by:  My Chart    Mode of Communication:  Video Conference via Amwell    As the provider I attest to compliance with applicable laws and regulations related to telemedicine.    DATA  Extended Session (53+ minutes): PROLONGED SERVICE IN THE OUTPATIENT SETTING REQUIRING DIRECT (FACE-TO-FACE) PATIENT CONTACT BEYOND THE USUAL SERVICE:    - Treatment protocol required additional time to complete, due to the nature of diagnosis being treated.  See Interventions section for details  Interactive Complexity: No  Crisis: No        Progress Since Last Session (Related to Symptoms / Goals / Homework):   Symptoms: Continuing to struggle, especially within her relationship    Homework: Progress made  Tomorrow enjoy your company, journaling in advance to prepare -done  Set a weekly plan -not done  Look into a couples  "therapist -not done  Get to the pool -not done    Notice the sensation of \"I'm overwhelmed\" and how it comes up against \"I have to do this\"     Episode of Care Goals: Satisfactory progress - ACTION (Actively working towards change); Intervened by reinforcing change plan / affirming steps taken     Current / Ongoing Stressors and Concerns:   Patient is currently socially isolated. She has a conflictual relationship with her .  She is getting minimal physical activity.  She had recent eye surgery and shoulder surgery.     Treatment Objective(s) Addressed in This Session:   Patient will increase frequency of engaging her in ADLs.  Patient will track and record at least 5 pleasant exchanges with . Patient will be able to identify at least 5 positive traits about her .  Patient will reduce level of depressive and anxious features as evidenced by reduction in score on her CHAVO-7 and PHQ-9 (scores of 15 and 16 at first measurment, respectively).     Intervention:   Internal Family Systems Therapy: Discussed ongoing hope about gardening. Discussed relationship and financial concerns. Talked about how to find happiness while doing tasks instead of resenting them. Talked about doing more internal work and focusing on family history in future sessions.    The following assessments were completed by patient for this visit:  PHQ9:   PHQ-9 SCORE 4/12/2022 4/18/2022 4/18/2022 5/2/2022 5/6/2022 5/10/2022 5/12/2022   PHQ-9 Total Score MyChart 17 (Moderately severe depression) - 18 (Moderately severe depression) 20 (Severe depression) 18 (Moderately severe depression) 18 (Moderately severe depression) 15 (Moderately severe depression)   PHQ-9 Total Score 17 18 18 20 18 18 15   Some encounter information is confidential and restricted. Go to Review Flowsheets activity to see all data.     GAD2:   CHAVO-2 4/18/2022 4/18/2022 5/2/2022 5/2/2022 5/10/2022 5/10/2022 5/10/2022   Feeling nervous, anxious, or on edge 3 3 3 3 " 3 3 3   Not being able to stop or control worrying 3 3 2 2 3 3 3   CHAVO-2 Total Score 6 6 5 5 6 6 6     PROMIS 10-Global Health (only subscores and total score):   PROMIS-10 Scores Only 12/14/2021 12/14/2021 3/15/2022 3/15/2022 3/15/2022 3/24/2022 4/1/2022   Global Mental Health Score 6 6 6 6 6 8 12   Global Physical Health Score 9 9 9 9 9 8 11   PROMIS TOTAL - SUBSCORES 15 15 15 15 15 16 23         ASSESSMENT: Current Emotional / Mental Status (status of significant symptoms):   Risk status (Self / Other harm or suicidal ideation)   Patient denies current fears or concerns for personal safety.   Patient denies current or recent suicidal ideation or behaviors.   Patient denies current or recent homicidal ideation or behaviors.   Patient denies current or recent self injurious behavior or ideation.   Patient denies other safety concerns.   Patient reports there has been no change in risk factors since their last session.     Patient reports there has been no change in protective factors since their last session.     A safety and risk management plan has been developed including: Patient has no change in safety concerns. Committed to safety and agreed to follow previously developed safety plan..  Patient consented to co-developed safety plan.  Safety and risk management plan was completed.  Patient agreed to use safety plan should any safety concerns arise.  A copy was given to the patient.     Appearance:   Appropriate    Eye Contact:   Good    Psychomotor Behavior: Normal    Attitude:   Cooperative    Orientation:   All   Speech    Rate / Production: Normal/ Responsive    Volume:  Normal    Mood:    Normal   Affect:    Appropriate    Thought Content:  Clear    Thought Form:  Coherent    Insight:    Good      Medication Review:   No changes to current psychiatric medication(s)     Medication Compliance:   Yes     Changes in Health Issues:   None reported     Chemical Use Review:   Substance Use: Chemical use reviewed,  "no active concerns identified Nothing used since 2021.     Tobacco Use: No current tobacco use.      Diagnosis:  1. Bipolar 2 disorder (H)    2. Generalized anxiety disorder    3. Trauma and stressor-related disorder      Collateral Reports Completed:   Not Applicable    PLAN: (Patient Tasks / Therapist Tasks / Other)  Return to doing direct IFS work each day  Set a weekly plan  Look into a couples therapist  Get to the pool    Notice the sensation of \"I'm overwhelmed\" and how it comes up against \"I have to do this\"    In the future:  Get back to budgeting        In the future- work with this part of yourself that spends the money- not wanting to miss out, as well as how you're hurt by family and money     In the future, return to: the part that puts up walls and the part that is sensitive to rejection      There has been demonstrated improvement in functioning while patient has been engaged in psychotherapy/psychological service- if withdrawn the patient would deteriorate and/or relapse.     MICAELA SLADE   May 12, 2022                                                       ______________________________________________________________________    Individual Treatment Plan    Patient's Name: Randi Cleary  YOB: 1955    Date of Creation: 20  Date Treatment Plan Last Reviewed/Revised: 3/9/22    DSM5 Diagnoses: 296.89 Bipolar II Disorder Depressed, 300.02 (F41.1) Generalized Anxiety Disorder or Adjustment Disorders  309.89 (F43.8) Other Specified Trauma and Stressor Related Disorder  Psychosocial / Contextual Factors: Patient's entire family of origin has , she now has a sister-in-law and  as support.  Relationship with  is conflictual. She is recovering from surgeries  PROMIS (reviewed every 90 days): PROMIS-10 Scores  PROMIS 10-Global Health (only subscores and total score):   PROMIS-10 Scores Only 2021 2021 3/15/2022 3/15/2022 3/15/2022 3/24/2022 " "4/1/2022   Global Mental Health Score 6 6 6 6 6 8 12   Global Physical Health Score 9 9 9 9 9 8 11   PROMIS TOTAL - SUBSCORES 15 15 15 15 15 16 23          Referral / Collaboration:  Referral to another professional/service is not indicated at this time..    Anticipated number of session for this episode of care: 50  Anticipation frequency of session: Biweekly  Anticipated Duration of each session: 53 or more minutes due to intensity of trauma symptoms  Treatment plan will be reviewed in 90 days or when goals have been changed.   There has been demonstrated improvement in functioning while patient has been engaged in psychotherapy/psychological service- if withdrawn the patient would deteriorate and/or relapse.       MeasurableTreatment Goal(s) related to diagnosis / functional impairment(s)  Goal 1: Patient will \"jumpstart, getting going with the things I need to be doing around the house as far as picking up, doing things, trying to do something every day.  Also to lessen the animosity between me and my .\"    I will know I've met my goal when my shoulders are fixed and I can see.      Objective #A (Patient Action)    Patient will increase frequency of engaging her in ADLs.  Status: Continued - Date(s): 12/10/21, 3/9/22    Intervention(s)  Therapist will engage patient in CBT, specifically behavioral activation.    Objective #B  Patient will  track and record at least 5 pleasant exchanges with . Patient will be able to identify at least 5 positive traits about her  and how he relates to her.  Status: Continued - Date(s): 12/10/21, 3/9/22    Intervention(s)  Therapist will teach assertiveness skills and assign homework related to relationship interactions.    Objective #C  Patient will reduce level of depressive and anxious features as evidenced by reduction in score on her CHAVO-7 and PHQ-9 (scores of 15 and 16 at first measurment, respectively).  Status: Continued - Date(s): 12/10/21, " "3/9/22    Intervention(s)  Therapist will engage patient in person-centered therapy and CBT.    Patient has reviewed and agreed to the above plan.      MICAELA SLADE  March 9, 2022                                                   Randi Cleary          SAFETY PLAN:  Step 1: Warning signs / cues (Thoughts, images, mood, situation, behavior) that a crisis may be developing:  ? Thoughts: \"I don't want to continue\" \"I am unwanted\"  ? Images: none  ? Thinking Processes: ruminating  ? Mood: anger  ? Behaviors: isolating/withdrawing , can't stop crying, not taking care of myself and not taking care of my responsibilities  ? Situations: small triggers, such as not being able to find something, or dropping something   Step 2: Coping strategies - Things I can do to take my mind off of my problems without contacting another person (relaxation technique, physical activity):  ? Distress Tolerance Strategies:  arts and crafts: drawing, play with my pet , listen to positive and upbeat music: any, change body temperature (ice pack/cold water)  and paced breathing/progressive muscle relaxation  ? Physical Activities: go for a walk, deep breathing and stretching   ? Focus on helpful thoughts:  \"You've been through this before, you can get through it again.\"  Step 3: People and social settings that provide distraction:                 Name: Carmen                            Name: [place]                           Name: Aleida       ? pool, shopping, Carmen's house, [employer]       Step 4: Remind myself of people and things that are important to me and worth living for:  [place], Aleida Horton, post-COVID world, options of what could be in your future        Step 5: When I am in crisis, I can ask these people to help me use my safety plan:                 Name: Sidney  Step 6: Making the environment safe:   ? go to sleep/daydream  Step 7: Professionals or agencies I can contact during a crisis:  ? [facility] Day[facility] " Number: [phone]  ? Suicide Prevention Lifeline: 1-976-044-TALK (0299)  ? Crisis Text Line Service (available 24 hours a day, 7 days a week): Text [place] to 523766    Local Crisis Services: [facility] Crisis: [phone]     Call 911 or go to my nearest emergency department.       I helped develop this safety plan and agree to use it when needed.  I have been given a copy of this plan.       Client signature _________________________________________________________________  Today s date:  11/24/2020  Adapted from Safety Plan Template 2008 Randi Poole and Robby Barba is reprinted with the express permission of the authors.  No portion of the Safety Plan Template may be reproduced without the express, written permission.  You can contact the authors at [email or web address] or [email or web address].

## 2022-05-13 ASSESSMENT — PATIENT HEALTH QUESTIONNAIRE - PHQ9: SUM OF ALL RESPONSES TO PHQ QUESTIONS 1-9: 15

## 2022-05-16 ENCOUNTER — VIRTUAL VISIT (OUTPATIENT)
Dept: PSYCHOLOGY | Facility: CLINIC | Age: 67
End: 2022-05-16
Payer: MEDICARE

## 2022-05-16 DIAGNOSIS — F31.81 BIPOLAR 2 DISORDER (H): Primary | ICD-10-CM

## 2022-05-16 DIAGNOSIS — F43.9 TRAUMA AND STRESSOR-RELATED DISORDER: ICD-10-CM

## 2022-05-16 DIAGNOSIS — F41.1 GENERALIZED ANXIETY DISORDER: ICD-10-CM

## 2022-05-16 PROCEDURE — 90834 PSYTX W PT 45 MINUTES: CPT | Mod: 95 | Performed by: SOCIAL WORKER

## 2022-05-16 ASSESSMENT — PATIENT HEALTH QUESTIONNAIRE - PHQ9
SUM OF ALL RESPONSES TO PHQ QUESTIONS 1-9: 19
10. IF YOU CHECKED OFF ANY PROBLEMS, HOW DIFFICULT HAVE THESE PROBLEMS MADE IT FOR YOU TO DO YOUR WORK, TAKE CARE OF THINGS AT HOME, OR GET ALONG WITH OTHER PEOPLE: VERY DIFFICULT
SUM OF ALL RESPONSES TO PHQ QUESTIONS 1-9: 19

## 2022-05-16 NOTE — CONFIDENTIAL NOTE
"[facility]                                     Progress Note    Patient Name: Randi Cleary  Date: 5/16/22         Service Type: Individual     Session Start Time: 10:30 AM  Session End Time: 11:18 AM     Session Length: 48 minutes    Session #: 111    Attendees: Client attended alone    Service Modality:  Video Visit:      Provider verified identity through the following two step process.  Patient provided:  Patient is known previously to provider    Telemedicine Visit: The patient's condition can be safely assessed and treated via synchronous audio and visual telemedicine encounter.      Reason for Telemedicine Visit: Services only offered telehealth    Originating Site (Patient Location): Patient's home    Distant Site (Provider Location): Provider Remote Setting- Home Office    Consent:  The patient/guardian has verbally consented to: the potential risks and benefits of telemedicine (video visit) versus in person care; bill my insurance or make self-payment for services provided; and responsibility for payment of non-covered services.     Patient would like the video invitation sent by:  My Chart    Mode of Communication:  Video Conference via Amwell    As the provider I attest to compliance with applicable laws and regulations related to telemedicine.    DATA  Extended Session (53+ minutes): No  Interactive Complexity: No  Crisis: No        Progress Since Last Session (Related to Symptoms / Goals / Homework):   Symptoms: Continuing to feel unstable    Homework: Progress made  Set a weekly plan -done  Look into a couples therapist -not done  Get to the pool -not done    Notice the sensation of \"I'm overwhelmed\" and how it comes up against \"I have to do this\"     Episode of Care Goals: Satisfactory progress - ACTION (Actively working towards change); Intervened by reinforcing change plan / affirming steps taken     Current / Ongoing Stressors and Concerns:   Patient is currently " socially isolated. She has a conflictual relationship with her .  She is getting minimal physical activity.  She had recent eye surgery and shoulder surgery.     Treatment Objective(s) Addressed in This Session:   Patient will increase frequency of engaging her in ADLs.  Patient will track and record at least 5 pleasant exchanges with . Patient will be able to identify at least 5 positive traits about her .  Patient will reduce level of depressive and anxious features as evidenced by reduction in score on her CHAVO-7 and PHQ-9 (scores of 15 and 16 at first measurment, respectively).     Intervention:   Internal Family Systems Therapy: Talked about patient's concerns about her neck and her doctor regarding this. Talked about how she's been feeling overwhelmed and her plan to take a step back is to reduce some sessions and find a way to take a vacation.    The following assessments were completed by patient for this visit:  PHQ9:   PHQ-9 SCORE 4/18/2022 4/18/2022 5/2/2022 5/6/2022 5/10/2022 5/12/2022 5/16/2022   PHQ-9 Total Score MyChart - 18 (Moderately severe depression) 20 (Severe depression) 18 (Moderately severe depression) 18 (Moderately severe depression) 15 (Moderately severe depression) 19 (Moderately severe depression)   PHQ-9 Total Score 18 18 20 18 18 15 19   Some encounter information is confidential and restricted. Go to Review Flowsheets activity to see all data.     GAD2:   CHAVO-2 4/18/2022 4/18/2022 5/2/2022 5/2/2022 5/10/2022 5/10/2022 5/10/2022   Feeling nervous, anxious, or on edge 3 3 3 3 3 3 3   Not being able to stop or control worrying 3 3 2 2 3 3 3   CHAVO-2 Total Score 6 6 5 5 6 6 6     PROMIS 10-Global Health (only subscores and total score):   PROMIS-10 Scores Only 12/14/2021 12/14/2021 3/15/2022 3/15/2022 3/15/2022 3/24/2022 4/1/2022   Global Mental Health Score 6 6 6 6 6 8 12   Global Physical Health Score 9 9 9 9 9 8 11   PROMIS TOTAL - SUBSCORES 15 15 15 15 15 16 23          ASSESSMENT: Current Emotional / Mental Status (status of significant symptoms):   Risk status (Self / Other harm or suicidal ideation)   Patient denies current fears or concerns for personal safety.   Patient denies current or recent suicidal ideation or behaviors.   Patient denies current or recent homicidal ideation or behaviors.   Patient denies current or recent self injurious behavior or ideation.   Patient denies other safety concerns.   Patient reports there has been no change in risk factors since their last session.     Patient reports there has been no change in protective factors since their last session.     A safety and risk management plan has been developed including: Patient has no change in safety concerns. Committed to safety and agreed to follow previously developed safety plan..  Patient consented to co-developed safety plan.  Safety and risk management plan was completed.  Patient agreed to use safety plan should any safety concerns arise.  A copy was given to the patient.     Appearance:   Appropriate    Eye Contact:   Good    Psychomotor Behavior: Normal    Attitude:   Cooperative    Orientation:   All   Speech    Rate / Production: Normal/ Responsive    Volume:  Normal    Mood:    Normal   Affect:    Appropriate    Thought Content:  Clear    Thought Form:  Coherent    Insight:    Good      Medication Review:   No changes to current psychiatric medication(s)     Medication Compliance:   Yes     Changes in Health Issues:   None reported     Chemical Use Review:   Substance Use: Chemical use reviewed, no active concerns identified Nothing used since August 2021.     Tobacco Use: No current tobacco use.      Diagnosis:  1. Bipolar 2 disorder (H)    2. Generalized anxiety disorder    3. Trauma and stressor-related disorder      Collateral Reports Completed:   Not Applicable    PLAN: (Patient Tasks / Therapist Tasks / Other)  Get in the pool  Get moving again  Look into a couples  therapist    In the future:  Get back to budgeting      There has been demonstrated improvement in functioning while patient has been engaged in psychotherapy/psychological service- if withdrawn the patient would deteriorate and/or relapse.     CRUZ MICAELA BAKER JO   May 16, 2022                                                       ______________________________________________________________________    Individual Treatment Plan    Patient's Name: Randi Cleary  YOB: 1955    Date of Creation: 20  Date Treatment Plan Last Reviewed/Revised: 3/9/22    DSM5 Diagnoses: 296.89 Bipolar II Disorder Depressed, 300.02 (F41.1) Generalized Anxiety Disorder or Adjustment Disorders  309.89 (F43.8) Other Specified Trauma and Stressor Related Disorder  Psychosocial / Contextual Factors: Patient's entire family of origin has , she now has a sister-in-law and  as support.  Relationship with  is conflictual. She is recovering from surgeries  PROMIS (reviewed every 90 days): PROMIS-10 Scores  PROMIS 10-Global Health (only subscores and total score):   PROMIS-10 Scores Only 2021 2021 3/15/2022 3/15/2022 3/15/2022 3/24/2022 2022   Global Mental Health Score 6 6 6 6 6 8 12   Global Physical Health Score 9 9 9 9 9 8 11   PROMIS TOTAL - SUBSCORES 15 15 15 15 15 16 23          Referral / Collaboration:  Referral to another professional/service is not indicated at this time..    Anticipated number of session for this episode of care: 50  Anticipation frequency of session: Biweekly  Anticipated Duration of each session: 53 or more minutes due to intensity of trauma symptoms  Treatment plan will be reviewed in 90 days or when goals have been changed.   There has been demonstrated improvement in functioning while patient has been engaged in psychotherapy/psychological service- if withdrawn the patient would deteriorate and/or relapse.       MeasurableTreatment Goal(s) related to diagnosis  "/ functional impairment(s)  Goal 1: Patient will \"jumpstart, getting going with the things I need to be doing around the house as far as picking up, doing things, trying to do something every day.  Also to lessen the animosity between me and my .\"    I will know I've met my goal when my shoulders are fixed and I can see.      Objective #A (Patient Action)    Patient will increase frequency of engaging her in ADLs.  Status: Continued - Date(s): 12/10/21, 3/9/22    Intervention(s)  Therapist will engage patient in CBT, specifically behavioral activation.    Objective #B  Patient will  track and record at least 5 pleasant exchanges with . Patient will be able to identify at least 5 positive traits about her  and how he relates to her.  Status: Continued - Date(s): 12/10/21, 3/9/22    Intervention(s)  Therapist will teach assertiveness skills and assign homework related to relationship interactions.    Objective #C  Patient will reduce level of depressive and anxious features as evidenced by reduction in score on her CHAVO-7 and PHQ-9 (scores of 15 and 16 at first measurment, respectively).  Status: Continued - Date(s): 12/10/21, 3/9/22    Intervention(s)  Therapist will engage patient in person-centered therapy and CBT.    Patient has reviewed and agreed to the above plan.      MICAELA SLADE  March 9, 2022                                                   Randi Cleary          SAFETY PLAN:  Step 1: Warning signs / cues (Thoughts, images, mood, situation, behavior) that a crisis may be developing:  ? Thoughts: \"I don't want to continue\" \"I am unwanted\"  ? Images: none  ? Thinking Processes: ruminating  ? Mood: anger  ? Behaviors: isolating/withdrawing , can't stop crying, not taking care of myself and not taking care of my responsibilities  ? Situations: small triggers, such as not being able to find something, or dropping something   Step 2: Coping strategies - Things I can do to take my " "mind off of my problems without contacting another person (relaxation technique, physical activity):  ? Distress Tolerance Strategies:  arts and crafts: drawing, play with my pet , listen to positive and upbeat music: any, change body temperature (ice pack/cold water)  and paced breathing/progressive muscle relaxation  ? Physical Activities: go for a walk, deep breathing and stretching   ? Focus on helpful thoughts:  \"You've been through this before, you can get through it again.\"  Step 3: People and social settings that provide distraction:                 Name: Carmen                            Name: Darien                           Name: Aleida       ? pool, shopping, Carmen's house, [employer]       Step 4: Remind myself of people and things that are important to me and worth living for:  Clifford Little Donna, post-COVID world, options of what could be in your future        Step 5: When I am in crisis, I can ask these people to help me use my safety plan:                 Name: Sidney  Step 6: Making the environment safe:   ? go to sleep/daydream  Step 7: Professionals or agencies I can contact during a crisis:  ? [facility] Daytime Number: [phone]  ? Suicide Prevention Lifeline: 5-302-853-TALK (9955)  ? Crisis Text Line Service (available 24 hours a day, 7 days a week): Text [place] to 733797    Local Crisis Services: [facility] Crisis: [phone]     Call 911 or go to my nearest emergency department.       I helped develop this safety plan and agree to use it when needed.  I have been given a copy of this plan.       Client signature _________________________________________________________________  Today s date:  11/24/2020  Adapted from Safety Plan Template 2008 Randi Poole and Robyb Barba is reprinted with the express permission of the authors.  No portion of the Safety Plan Template may be reproduced without the express, written permission.  You can contact the authors at " [email or web address] or [email or web address].

## 2022-05-17 ASSESSMENT — PATIENT HEALTH QUESTIONNAIRE - PHQ9: SUM OF ALL RESPONSES TO PHQ QUESTIONS 1-9: 19

## 2022-05-20 ENCOUNTER — HOSPITAL ENCOUNTER (OUTPATIENT)
Dept: MRI IMAGING | Facility: HOSPITAL | Age: 67
Discharge: HOME | End: 2022-05-20
Attending: NURSE PRACTITIONER | Admitting: NURSE PRACTITIONER
Payer: MEDICARE

## 2022-05-20 DIAGNOSIS — R20.0 NUMBNESS OF FINGERS: ICD-10-CM

## 2022-05-20 PROCEDURE — 73221 MRI JOINT UPR EXTREM W/O DYE: CPT | Mod: RT

## 2022-05-23 ENCOUNTER — VIRTUAL VISIT (OUTPATIENT)
Dept: NEUROSURGERY | Facility: CLINIC | Age: 67
End: 2022-05-23
Payer: MEDICARE

## 2022-05-23 VITALS — WEIGHT: 256 LBS | HEIGHT: 66 IN | BODY MASS INDEX: 41.14 KG/M2

## 2022-05-23 DIAGNOSIS — M54.12 CERVICAL RADICULOPATHY: Primary | ICD-10-CM

## 2022-05-23 PROCEDURE — 99442 PR PHYSICIAN TELEPHONE EVALUATION 11-20 MIN: CPT | Performed by: NURSE PRACTITIONER

## 2022-05-23 NOTE — LETTER
5/23/2022         RE: Randi Cleary  [address] [facility] [address]        Dear Colleague,    Thank you for referring your patient, Randi Cleary, to the [facility]. Please see a copy of my visit note below.    Neurosurgery telephone visit:    A/P:   Winnie Cleary is a 66 year old S/P left open door laminoplasty C3-6, foraminotomies left C4-7 for cervical myelopathy, radiculopathy, stenosis with Dr Gregory 12/21/2021. She was referred to have a R wrist MRI and follow up on results by phone.    Winnie is doing okay. The 'bees stinging' pain in her arm from preop is gone. She has continued pain and numbness in her R thumb, 2nd, 3rd fingers, however. Flares up and down. She continues to drop things. She just started PT sp laminoplasty. We reviewed her wrist MRI results. There are degenerative findings which might explain her symptoms. Given that it is not my specialty, I recommended that she follow-up with hand specialist who did her carpal tunnel surgery 2021. She feels like she never really healed from that surgery. Recommended she proceed with PT and follow up with us in another 6-8 wks to review symptoms.     PLAN:   1. Follow up with hand specialty about wrist MRI results    2. Continue PT     3. Return in 6-8 weeks after the start of PT with RAKAN in clinic on Gregory day to follow up on symptoms. No imaging needed for this appt.        HPI: Randi Cleary is a 66 year old female who presents with imbalance, numbness and tingling in her hands, fine motor difficulties, left shoulder pain, radiating left arm pain and numbness. She complains of both myelopathy and radiculopathy symptoms. MRI of her cervical spine sows multi-level cervical stenosis including moderate at C4-5, and moderate to severe at C6-7 and mild spinal canal stenosis at C5-6 and C3-4 with multi-level severe foraminal narrowing at these levels. Also has retrolisthesis of C4-5 and  anterolisthesis of C7-T1. XR does not show any significant instability. CTR [place] 2021. Reports Hx: taking oxycodone for 20 years, medical cannabis. Sober since august 2021. Lithium orotate and oxarbazepine in the past. Bipolar spectrum disorder. [facility]g [facility]??      Telephone visit 15mins    Alexia CADENA-C  [facility]  O. [phone]              Again, thank you for allowing me to participate in the care of your patient.        Sincerely,        DION Campbell CNP

## 2022-05-23 NOTE — PROGRESS NOTES
Neurosurgery telephone visit:    A/P:   Winnie Cleary is a 66 year old S/P left open door laminoplasty C3-6, foraminotomies left C4-7 for cervical myelopathy, radiculopathy, stenosis with Dr Gregory 12/21/2021. She was referred to have a R wrist MRI and follow up on results by phone.    Winnie is doing okay. The 'bees stinging' pain in her arm from preop is gone. She has continued pain and numbness in her R thumb, 2nd, 3rd fingers, however. Flares up and down. She continues to drop things. She just started PT sp laminoplasty. We reviewed her wrist MRI results. There are degenerative findings which might explain her symptoms. Given that it is not my specialty, I recommended that she follow-up with hand specialist who did her carpal tunnel surgery 2021. She feels like she never really healed from that surgery. Recommended she proceed with PT and follow up with us in another 6-8 wks to review symptoms.     PLAN:   1. Follow up with hand specialty about wrist MRI results    2. Continue PT     3. Return in 6-8 weeks after the start of PT with RAKAN in clinic on Gregory day to follow up on symptoms. No imaging needed for this appt.        HPI: Randi Cleary is a 66 year old female who presents with imbalance, numbness and tingling in her hands, fine motor difficulties, left shoulder pain, radiating left arm pain and numbness. She complains of both myelopathy and radiculopathy symptoms. MRI of her cervical spine sows multi-level cervical stenosis including moderate at C4-5, and moderate to severe at C6-7 and mild spinal canal stenosis at C5-6 and C3-4 with multi-level severe foraminal narrowing at these levels. Also has retrolisthesis of C4-5 and anterolisthesis of C7-T1. XR does not show any significant instability. [facility] [place] 2021. Reports Hx: taking oxycodone for 20 years, medical cannabis. Sober since august 2021. Lithium orotate and oxarbazepine in the past. Bipolar spectrum disorder. [facility]      Telephone visit 15mins    Alexia GILMORE  [facility]surgery  O. [phone]

## 2022-05-23 NOTE — Clinical Note
Zack Pérez! This patient needs a follow up visit in 8wks. With RAKAN on Gregory day. Can be telephone visit if patient strongly prefers. No planned imaging. Thx!

## 2022-05-24 DIAGNOSIS — G95.20 CORD COMPRESSION (H): ICD-10-CM

## 2022-05-24 RX ORDER — HYDROCODONE BITARTRATE AND ACETAMINOPHEN 5; 325 MG/1; MG/1
1 TABLET ORAL 3 TIMES DAILY PRN
Qty: 70 TABLET | Refills: 0 | Status: SHIPPED | OUTPATIENT
Start: 2022-05-24 | End: 2022-06-22

## 2022-05-24 NOTE — TELEPHONE ENCOUNTER
Refill request: hydrocodone 5/325 mg  Last apt with Be:  UDT/CSA- 12/17/2021  Next apt with Be: 6/22/22    Last dispensed: 4/28/22-24 days    Pending Prescriptions:                       Disp   Refills    HYDROcodone-acetaminophen (NORCO) 5-325 M*70 tab*0            Sig: Take 1 tablet by mouth 3 times daily as needed           for breakthrough pain or moderate to severe pain    [facility]

## 2022-05-26 ENCOUNTER — TELEPHONE (OUTPATIENT)
Dept: NEUROSURGERY | Facility: CLINIC | Age: 67
End: 2022-05-26
Payer: MEDICARE

## 2022-05-26 DIAGNOSIS — F31.81 BIPOLAR 2 DISORDER (H): ICD-10-CM

## 2022-05-26 RX ORDER — OXCARBAZEPINE 150 MG/1
TABLET, FILM COATED ORAL
Qty: 30 TABLET | Refills: 3 | Status: SHIPPED | OUTPATIENT
Start: 2022-05-26 | End: 2023-04-11

## 2022-05-26 NOTE — TELEPHONE ENCOUNTER
Received call from patient requesting refill(s) of Oxcarbazepine     Last dispensed from pharmacy on 4/9    Patient's last office/virtual visit by prescribing provider on 4/6  Next office/virtual appointment scheduled for 6/22    Increase oxcarbazepine to 150 mg 1 at bedtime-  From last follow-up on 4/6      E-prescribe to [facility] pharmacy    Pending Prescriptions:                       Disp   Refills    OXcarbazepine (TRILEPTAL) 150 MG tablet   30 tab*3            Sig: Take one tablet daily at bedtime

## 2022-05-26 NOTE — TELEPHONE ENCOUNTER
05.26.22- Called and spoke with pt to sched her 8 week follow up with RAKAN (approx mid July). No imaging needed. She stated due to them only having one car and a lot of appointments over the next month or so, they are unsure of a good day to sched in July. She stated she will call us to schedule closer to July. -CRISPIN

## 2022-05-27 ENCOUNTER — TELEPHONE (OUTPATIENT)
Dept: NEUROSURGERY | Facility: CLINIC | Age: 67
End: 2022-05-27
Payer: MEDICARE

## 2022-05-27 NOTE — TELEPHONE ENCOUNTER
05.27.22- Pt wants to hold off on scheduling 8 week f/u with RAKAN on  day. She will call us close to the time to sched

## 2022-05-31 ENCOUNTER — MYC MEDICAL ADVICE (OUTPATIENT)
Dept: INTERNAL MEDICINE | Facility: CLINIC | Age: 67
End: 2022-05-31
Payer: MEDICARE

## 2022-05-31 DIAGNOSIS — E89.0 POSTABLATIVE HYPOTHYROIDISM: ICD-10-CM

## 2022-06-01 ENCOUNTER — MYC MEDICAL ADVICE (OUTPATIENT)
Dept: INTERNAL MEDICINE | Facility: CLINIC | Age: 67
End: 2022-06-01
Payer: MEDICARE

## 2022-06-02 ENCOUNTER — VIRTUAL VISIT (OUTPATIENT)
Dept: INTERNAL MEDICINE | Facility: CLINIC | Age: 67
End: 2022-06-02
Payer: MEDICARE

## 2022-06-02 ENCOUNTER — TELEPHONE (OUTPATIENT)
Dept: PALLIATIVE MEDICINE | Facility: OTHER | Age: 67
End: 2022-06-02

## 2022-06-02 DIAGNOSIS — E66.01 MORBID OBESITY (H): ICD-10-CM

## 2022-06-02 DIAGNOSIS — I10 ESSENTIAL HYPERTENSION: ICD-10-CM

## 2022-06-02 DIAGNOSIS — J98.8 VIRAL RESPIRATORY INFECTION: Primary | ICD-10-CM

## 2022-06-02 DIAGNOSIS — J01.01 ACUTE RECURRENT MAXILLARY SINUSITIS: ICD-10-CM

## 2022-06-02 DIAGNOSIS — E11.9 TYPE 2 DIABETES MELLITUS WITHOUT COMPLICATION, WITHOUT LONG-TERM CURRENT USE OF INSULIN (H): ICD-10-CM

## 2022-06-02 DIAGNOSIS — B97.89 VIRAL RESPIRATORY INFECTION: Primary | ICD-10-CM

## 2022-06-02 DIAGNOSIS — E83.119 HEMOCHROMATOSIS, UNSPECIFIED: ICD-10-CM

## 2022-06-02 DIAGNOSIS — L40.50 PSORIATIC ARTHROPATHY (H): ICD-10-CM

## 2022-06-02 PROBLEM — G95.20 CORD COMPRESSION (H): Chronic | Status: RESOLVED | Noted: 2021-12-21 | Resolved: 2022-06-02

## 2022-06-02 PROCEDURE — 99215 OFFICE O/P EST HI 40 MIN: CPT | Mod: 95 | Performed by: INTERNAL MEDICINE

## 2022-06-02 RX ORDER — LOSARTAN POTASSIUM 25 MG/1
25 TABLET ORAL DAILY
Qty: 90 TABLET | Refills: 2 | COMMUNITY
Start: 2022-06-02 | End: 2022-09-27

## 2022-06-02 RX ORDER — PREDNISONE 20 MG/1
20 TABLET ORAL EVERY MORNING
Qty: 30 TABLET | Refills: 1 | Status: SHIPPED | OUTPATIENT
Start: 2022-06-02 | End: 2022-06-09

## 2022-06-02 RX ORDER — AZITHROMYCIN 500 MG/1
500 TABLET, FILM COATED ORAL DAILY
Qty: 7 TABLET | Refills: 0 | Status: SHIPPED | OUTPATIENT
Start: 2022-06-02 | End: 2022-06-09

## 2022-06-02 RX ORDER — GUAIFENESIN 600 MG/1
600 TABLET, EXTENDED RELEASE ORAL 2 TIMES DAILY
Qty: 60 TABLET | Refills: 2 | Status: SHIPPED | OUTPATIENT
Start: 2022-06-02 | End: 2022-12-07

## 2022-06-02 RX ORDER — BENZONATATE 200 MG/1
200 CAPSULE ORAL 3 TIMES DAILY PRN
Qty: 20 CAPSULE | Refills: 1 | Status: SHIPPED | OUTPATIENT
Start: 2022-06-02 | End: 2022-08-31

## 2022-06-02 NOTE — PATIENT INSTRUCTIONS
Prednisone 20 mg daily until symptoms resolved with larger quantity to repeat in the future    Consider stopping losartan    Guaifenesin 600 mg twice daily    Benzonatate 200 mg every 8 hours as needed    If symptoms do not improve over the next 48 to 72 hours add azithromycin 500 mg daily  reviewed    Consider retesting for COVID

## 2022-06-02 NOTE — PROGRESS NOTES
Randi is a 66 year old female contacting the clinic today via video, who will use the platform: Saqina for the visit.  Phone # for Doximity, or if Amwell drops:   Telephone Information:   Mobile [phone]          ASSESSMENT and PLAN:  1. Viral respiratory infection  3 days in doubt bacterial.  Treat for inflammatory.  Provide antitussives on narcotics.  If no improvement after 2 to 3 days may add azithromycin  - predniSONE (DELTASONE) 20 MG tablet; Take 1 tablet (20 mg) by mouth every morning for 7 days  Dispense: 30 tablet; Refill: 1  - azithromycin (ZITHROMAX) 500 MG tablet; Take 1 tablet (500 mg) by mouth daily for 7 days  Dispense: 7 tablet; Refill: 0  - guaiFENesin (MUCINEX) 600 MG 12 hr tablet; Take 1 tablet (600 mg) by mouth 2 times daily  Dispense: 60 tablet; Refill: 2  - benzonatate (TESSALON) 200 MG capsule; Take 1 capsule (200 mg) by mouth 3 times daily as needed for cough  Dispense: 20 capsule; Refill: 1    2. Acute recurrent maxillary sinusitis  As above.  Question component of losartan  - predniSONE (DELTASONE) 20 MG tablet; Take 1 tablet (20 mg) by mouth every morning for 7 days  Dispense: 30 tablet; Refill: 1  - azithromycin (ZITHROMAX) 500 MG tablet; Take 1 tablet (500 mg) by mouth daily for 7 days  Dispense: 7 tablet; Refill: 0  - guaiFENesin (MUCINEX) 600 MG 12 hr tablet; Take 1 tablet (600 mg) by mouth 2 times daily  Dispense: 60 tablet; Refill: 2  - benzonatate (TESSALON) 200 MG capsule; Take 1 capsule (200 mg) by mouth 3 times daily as needed for cough  Dispense: 20 capsule; Refill: 1    3. Essential hypertension  Discussed trial off losartan  - losartan (COZAAR) 25 MG tablet; Take 1 tablet (25 mg) by mouth daily  Dispense: 90 tablet; Refill: 2    4. Psoriatic arthropathy (H)  Stable but taking narcotics.  Agree with attempt to wean    5. Morbid obesity (H)  Recent weight loss noted    6. Type 2 diabetes mellitus without complication, without long-term current use of insulin (H)  Due for  "updated labs       Patient Instructions   Prednisone 20 mg daily until symptoms resolved with larger quantity to repeat in the future    Consider stopping losartan    Guaifenesin 600 mg twice daily    Benzonatate 200 mg every 8 hours as needed    If symptoms do not improve over the next 48 to 72 hours add azithromycin 500 mg daily  reviewed    Consider retesting for COVID            Return in about 4 months (around 10/2/2022) for using a video visit.       CHIEF COMPLAINT:  Chief Complaint   Patient presents with     URI     Symptoms began Sunday afternoon. Nasal congestion, coughing.        HISTORY OF PRESENT ILLNESS:  Randi is a 66 year old female contacting the clinic today via video for complaints of bronchitis and request for antibiotic.  She became ill approximately 3 days ago.  Home test negative.  Antibiotics.  August C prednisone and doxycycline.  We discussed carefully the viral initiation intervention entered, and constipation.  Losartan at her August visit    REVIEW OF SYSTEMS:   Pain arthritis.  Takes narcotics    PFSH:  Social History     Social History Narrative     Not on file       TOBACCO USE:  History   Smoking Status     Former Smoker     Packs/day: 2.50     Years: 20.00     Types: Cigarettes     Start date: 5/1/1971     Quit date: 6/29/2000   Smokeless Tobacco     Never Used       VITALS:  There were no vitals filed for this visit.  There were no vitals taken for this visit. Estimated body mass index is 41.32 kg/m  as calculated from the following:    Height as of 5/23/22: 1.676 m (5' 6\").    Weight as of 5/23/22: 116.1 kg (256 lb).    PHYSICAL EXAM:  (observations via Video)  Alert and oriented but coughing frequently    MEDICATIONS:   Current Outpatient Medications   Medication Sig Dispense Refill     albuterol (PROVENTIL) (2.5 MG/3ML) 0.083% neb solution Take 1 vial (2.5 mg) by nebulization every 4 hours as needed for shortness of breath / dyspnea or wheezing 360 mL 5     albuterol " (VENTOLIN HFA) 108 (90 Base) MCG/ACT inhaler Inhale 2 puffs into the lungs every 6 hours 18 g 11     azithromycin (ZITHROMAX) 500 MG tablet Take 1 tablet (500 mg) by mouth daily for 7 days 7 tablet 0     benzonatate (TESSALON) 200 MG capsule Take 1 capsule (200 mg) by mouth 3 times daily as needed for cough 20 capsule 1     Cholecalciferol (VITAMIN D3) 250 MCG (82391 UT) TABS Take 1 tablet by mouth daily 95469/day       Cyanocobalamin (VITAMIN B-12) 5000 MCG SUBL Place 2-3 sprays under the tongue daily Unknown dose. 2 or 3 sprays/day       ethacrynic acid (EDECRIN) 25 MG tablet Take 1 tablet by mouth on Saturday and Wednesday 30 tablet 11     Fluticasone-Umeclidin-Vilanterol (TRELEGY ELLIPTA) 200-62.5-25 MCG/INH oral inhaler Inhale 1 puff into the lungs daily 1 each 11     guaiFENesin (MUCINEX) 600 MG 12 hr tablet Take 1 tablet (600 mg) by mouth 2 times daily 60 tablet 2     HYDROcodone-acetaminophen (NORCO) 5-325 MG tablet Take 1 tablet by mouth 3 times daily as needed for breakthrough pain or moderate to severe pain 70 tablet 0     LITHIUM PO Take 25 mg by mouth daily OTC lithium oratate       losartan (COZAAR) 25 MG tablet Take 1 tablet (25 mg) by mouth daily 90 tablet 2     magnesium 250 MG tablet Take 1 tablet by mouth 2 times daily       methocarbamol (ROBAXIN) 500 MG tablet Take 1 tablet (500 mg) by mouth 4 times daily Wean down on your frequency. - May fill/start 3/31/22 112 tablet 3     omeprazole (PRILOSEC) 20 MG DR capsule Take 20 mg by mouth daily        OXcarbazepine (TRILEPTAL) 150 MG tablet Take one tablet daily at bedtime 30 tablet 3     potassium chloride ER (KLOR-CON M) 20 MEQ CR tablet Take 1 tablet (20 mEq) by mouth daily 90 tablet 3     predniSONE (DELTASONE) 20 MG tablet Take 1 tablet (20 mg) by mouth every morning for 7 days 30 tablet 1     rOPINIRole (REQUIP) 2 MG tablet Take 1-2 tablets (2-4 mg) by mouth At Bedtime 120 tablet 3     SYNTHROID 150 MCG tablet Take 1 tablet (150 mcg) by mouth  daily 90 tablet 4     vitamin E 400 units TABS Take 800 Units by mouth daily       acetaminophen (TYLENOL) 325 MG tablet Take 2 tablets (650 mg) by mouth every 4 hours as needed for other (For optimal non-opioid multimodal pain management to improve pain control.)       medical cannabis (Patient's own supply) See Admin Instructions (The purpose of this order is to document that the patient reports taking medical cannabis.  This is not a prescription, and is not used to certify that the patient has a qualifying medical condition.)         Outside Notes summarized:   Labs, x-rays, cardiology, GI tests reviewed:   Recent Labs   Lab Test 02/23/22  1339 12/23/21  0815 07/13/21  1632 05/18/21  1022 05/14/21  1341 08/03/20  1601 08/03/20  1524   HGB 15.4 14.6   < >  --  16.3*   < >  --     141   < >  --  143   < >  --    POTASSIUM 3.8 4.0   < >  --  4.5   < >  --    CR 0.61 0.68   < >  --  0.69   < >  --    A1C 5.5  --   --   --  5.4   < >  --    B12 373  --   --   --   --   --  755   TSH 1.14  --   --  1.31 0.87   < >  --    VITDT 43  --   --   --   --   --   --     < > = values in this interval not displayed.     Lab Results   Component Value Date    QVEOJ15RIL Negative 12/17/2021    NFSFE61XEM POSITIVE 06/04/2021    TRAFJ23PMV NEGATIVE 02/22/2021     Lab Results   Component Value Date    VITDT 43 02/23/2022     Lab Results   Component Value Date    CHOL 180 05/18/2021     New orders: No orders of the defined types were placed in this encounter.      Independent review of:    Supplemental history by:      Patient has given verbal consent to a Video visit?  Yes  How would you like to obtain your AVS?  MyChart  Will anyone else be joining your video visit? No       Video-Visit Details  Type of service:  Video Visit  Originating Location (pt. Location): Home  Distant Location (provider location):   [facility]    Laith Constantino MD  [facility]  [facility]    Video Start Time: 11:00 AM  Video End time:  11:54 AM  Face to face plus orders: 54 minutes  Documentation time:  3 minutes     The visit lasted a total of 57 minutes

## 2022-06-02 NOTE — TELEPHONE ENCOUNTER
"Patient calls,  with status update that she has been diagnosed with a respiratory illness/infection and is in need of further medication such as \"cough syrup with codeine\"  Patient states that the primary doctor\" would not give this to her so she thought I will call Dr. Dubois and see if he will prescribe this\"    Chart review:  Patient was seen today for this respiratory illness. This appears to have been already appropriately treated with multiple medications and it would not seem appropriate for a  provider that has not seen or assessed this issue to prescribe further medication at this time.     Will route this message to both the primary care and pain provider as an FYI and if there are any other recommendations at this time please advise.  "

## 2022-06-03 DIAGNOSIS — E89.0 POSTABLATIVE HYPOTHYROIDISM: ICD-10-CM

## 2022-06-03 RX ORDER — LEVOTHYROXINE SODIUM 150 MCG
150 TABLET ORAL DAILY
Qty: 90 TABLET | Refills: 1 | Status: SHIPPED | OUTPATIENT
Start: 2022-06-03 | End: 2022-12-01

## 2022-06-03 RX ORDER — LEVOTHYROXINE SODIUM 150 MCG
150 TABLET ORAL DAILY
Qty: 30 TABLET | Refills: 11 | Status: SHIPPED | OUTPATIENT
Start: 2022-06-03 | End: 2022-06-03

## 2022-06-04 ENCOUNTER — HEALTH MAINTENANCE LETTER (OUTPATIENT)
Age: 67
End: 2022-06-04

## 2022-06-07 ENCOUNTER — OFFICE VISIT (OUTPATIENT)
Dept: INTERNAL MEDICINE | Facility: CLINIC | Age: 67
End: 2022-06-07
Payer: MEDICARE

## 2022-06-07 ENCOUNTER — VIRTUAL VISIT (OUTPATIENT)
Dept: PSYCHOLOGY | Facility: CLINIC | Age: 67
End: 2022-06-07
Payer: MEDICARE

## 2022-06-07 VITALS
HEIGHT: 66 IN | DIASTOLIC BLOOD PRESSURE: 76 MMHG | SYSTOLIC BLOOD PRESSURE: 130 MMHG | HEART RATE: 104 BPM | WEIGHT: 241.6 LBS | RESPIRATION RATE: 22 BRPM | OXYGEN SATURATION: 96 % | BODY MASS INDEX: 38.83 KG/M2

## 2022-06-07 DIAGNOSIS — M54.2 NECK PAIN: ICD-10-CM

## 2022-06-07 DIAGNOSIS — F41.1 GENERALIZED ANXIETY DISORDER: ICD-10-CM

## 2022-06-07 DIAGNOSIS — F31.81 BIPOLAR 2 DISORDER (H): Primary | ICD-10-CM

## 2022-06-07 DIAGNOSIS — M25.532 LEFT WRIST PAIN: ICD-10-CM

## 2022-06-07 DIAGNOSIS — E83.119 HEMOCHROMATOSIS, UNSPECIFIED: ICD-10-CM

## 2022-06-07 DIAGNOSIS — F43.9 TRAUMA AND STRESSOR-RELATED DISORDER: ICD-10-CM

## 2022-06-07 DIAGNOSIS — J20.9 ACUTE BRONCHITIS, UNSPECIFIED ORGANISM: Primary | ICD-10-CM

## 2022-06-07 DIAGNOSIS — E83.119 HEMOCHROMATOSIS, UNSPECIFIED HEMOCHROMATOSIS TYPE: ICD-10-CM

## 2022-06-07 DIAGNOSIS — E11.9 TYPE 2 DIABETES MELLITUS WITHOUT COMPLICATION, WITHOUT LONG-TERM CURRENT USE OF INSULIN (H): ICD-10-CM

## 2022-06-07 DIAGNOSIS — Z13.220 SCREENING FOR HYPERLIPIDEMIA: ICD-10-CM

## 2022-06-07 LAB
ALBUMIN SERPL-MCNC: 3.6 G/DL (ref 3.5–5)
ALP SERPL-CCNC: 72 U/L (ref 45–120)
ALT SERPL W P-5'-P-CCNC: 18 U/L (ref 0–45)
ANION GAP SERPL CALCULATED.3IONS-SCNC: 10 MMOL/L (ref 5–18)
AST SERPL W P-5'-P-CCNC: 17 U/L (ref 0–40)
BILIRUB DIRECT SERPL-MCNC: 0.2 MG/DL
BILIRUB SERPL-MCNC: 0.6 MG/DL (ref 0–1)
BUN SERPL-MCNC: 11 MG/DL (ref 8–22)
CALCIUM SERPL-MCNC: 9.5 MG/DL (ref 8.5–10.5)
CHLORIDE BLD-SCNC: 106 MMOL/L (ref 98–107)
CHOLEST SERPL-MCNC: 194 MG/DL
CO2 SERPL-SCNC: 26 MMOL/L (ref 22–31)
CREAT SERPL-MCNC: 0.68 MG/DL (ref 0.6–1.1)
FASTING STATUS PATIENT QL REPORTED: YES
GFR SERPL CREATININE-BSD FRML MDRD: >90 ML/MIN/1.73M2
GLUCOSE BLD-MCNC: 108 MG/DL (ref 70–125)
HBA1C MFR BLD: 5.7 % (ref 0–5.6)
HDLC SERPL-MCNC: 68 MG/DL
LDLC SERPL CALC-MCNC: 105 MG/DL
POTASSIUM BLD-SCNC: 3.5 MMOL/L (ref 3.5–5)
PROT SERPL-MCNC: 7 G/DL (ref 6–8)
SODIUM SERPL-SCNC: 142 MMOL/L (ref 136–145)
TRIGL SERPL-MCNC: 107 MG/DL

## 2022-06-07 PROCEDURE — 83550 IRON BINDING TEST: CPT | Performed by: INTERNAL MEDICINE

## 2022-06-07 PROCEDURE — 82248 BILIRUBIN DIRECT: CPT | Performed by: INTERNAL MEDICINE

## 2022-06-07 PROCEDURE — 85025 COMPLETE CBC W/AUTO DIFF WBC: CPT | Performed by: INTERNAL MEDICINE

## 2022-06-07 PROCEDURE — 80053 COMPREHEN METABOLIC PANEL: CPT | Performed by: INTERNAL MEDICINE

## 2022-06-07 PROCEDURE — 36415 COLL VENOUS BLD VENIPUNCTURE: CPT | Performed by: INTERNAL MEDICINE

## 2022-06-07 PROCEDURE — 99214 OFFICE O/P EST MOD 30 MIN: CPT | Performed by: INTERNAL MEDICINE

## 2022-06-07 PROCEDURE — 80061 LIPID PANEL: CPT | Performed by: INTERNAL MEDICINE

## 2022-06-07 PROCEDURE — 85045 AUTOMATED RETICULOCYTE COUNT: CPT | Performed by: INTERNAL MEDICINE

## 2022-06-07 PROCEDURE — 90834 PSYTX W PT 45 MINUTES: CPT | Mod: 95 | Performed by: SOCIAL WORKER

## 2022-06-07 PROCEDURE — 82728 ASSAY OF FERRITIN: CPT | Performed by: INTERNAL MEDICINE

## 2022-06-07 PROCEDURE — 83036 HEMOGLOBIN GLYCOSYLATED A1C: CPT | Performed by: INTERNAL MEDICINE

## 2022-06-07 NOTE — PROGRESS NOTES
[facility]                                     Progress Note    Patient Name: Randi Cleary  Date: 6/7/22         Service Type: Individual     Session Start Time: 8:36 AM  Session End Time: 9:24 AM     Session Length: 48 minutes    Session #: 112    Attendees: Client attended alone    Service Modality:  Video Visit:      Provider verified identity through the following two step process.  Patient provided:  Patient is known previously to provider    Telemedicine Visit: The patient's condition can be safely assessed and treated via synchronous audio and visual telemedicine encounter.      Reason for Telemedicine Visit: Services only offered telehealth    Originating Site (Patient Location): Patient's home    Distant Site (Provider Location): Provider Remote Setting- Home Office    Consent:  The patient/guardian has verbally consented to: the potential risks and benefits of telemedicine (video visit) versus in person care; bill my insurance or make self-payment for services provided; and responsibility for payment of non-covered services.     Patient would like the video invitation sent by:  My Chart    Mode of Communication:  Video Conference via Amwell    As the provider I attest to compliance with applicable laws and regulations related to telemedicine.    DATA  Extended Session (53+ minutes): No  Interactive Complexity: No  Crisis: No        Progress Since Last Session (Related to Symptoms / Goals / Homework):   Symptoms: Patient is physically sick and is feeling stressed    Homework: Progress made  Get in the pool  Get moving again  Look into a couples therapist     In the future:  Get back to budgeting       Episode of Care Goals: Satisfactory progress - ACTION (Actively working towards change); Intervened by reinforcing change plan / affirming steps taken     Current / Ongoing Stressors and Concerns:   Patient is currently socially isolated. She has a conflictual relationship with her  .  She is getting minimal physical activity.  She had recent eye surgery and shoulder surgery.     Treatment Objective(s) Addressed in This Session:   Patient will increase frequency of engaging her in ADLs.  Patient will track and record at least 5 pleasant exchanges with . Patient will be able to identify at least 5 positive traits about her .  Patient will reduce level of depressive and anxious features as evidenced by reduction in score on her CHAVO-7 and PHQ-9 (scores of 15 and 16 at first measurment, respectively).     Intervention:   Internal Family Systems Therapy: Reviewed health over the past month and how this has impacted her mental health. Looked at how to prioritize herself while she continues to deal with medical concerns and financial challenges. Reviewed homework and identified successes and barriers to completion.    The following assessments were completed by patient for this visit:  PHQ9:   PHQ-9 SCORE 4/18/2022 4/18/2022 5/2/2022 5/6/2022 5/10/2022 5/12/2022 5/16/2022   PHQ-9 Total Score MyChart - 18 (Moderately severe depression) 20 (Severe depression) 18 (Moderately severe depression) 18 (Moderately severe depression) 15 (Moderately severe depression) 19 (Moderately severe depression)   PHQ-9 Total Score 18 18 20 18 18 15 19   Some encounter information is confidential and restricted. Go to Review Flowsheets activity to see all data.     GAD2:   CHAVO-2 4/18/2022 4/18/2022 5/2/2022 5/2/2022 5/10/2022 5/10/2022 5/10/2022   Feeling nervous, anxious, or on edge 3 3 3 3 3 3 3   Not being able to stop or control worrying 3 3 2 2 3 3 3   CHAVO-2 Total Score 6 6 5 5 6 6 6     PROMIS 10-Global Health (only subscores and total score):   PROMIS-10 Scores Only 12/14/2021 12/14/2021 3/15/2022 3/15/2022 3/15/2022 3/24/2022 4/1/2022   Global Mental Health Score 6 6 6 6 6 8 12   Global Physical Health Score 9 9 9 9 9 8 11   PROMIS TOTAL - SUBSCORES 15 15 15 15 15 16 23         ASSESSMENT:  Current Emotional / Mental Status (status of significant symptoms):   Risk status (Self / Other harm or suicidal ideation)   Patient denies current fears or concerns for personal safety.   Patient denies current or recent suicidal ideation or behaviors.   Patient denies current or recent homicidal ideation or behaviors.   Patient denies current or recent self injurious behavior or ideation.   Patient denies other safety concerns.   Patient reports there has been no change in risk factors since their last session.     Patient reports there has been no change in protective factors since their last session.     A safety and risk management plan has been developed including: Patient has no change in safety concerns. Committed to safety and agreed to follow previously developed safety plan..  Patient consented to co-developed safety plan.  Safety and risk management plan was completed.  Patient agreed to use safety plan should any safety concerns arise.  A copy was given to the patient.     Appearance:   Appropriate    Eye Contact:   Good    Psychomotor Behavior: Normal    Attitude:   Cooperative    Orientation:   All   Speech    Rate / Production: Normal/ Responsive    Volume:  Normal    Mood:    Normal   Affect:    Appropriate    Thought Content:  Clear    Thought Form:  Coherent    Insight:    Good      Medication Review:   No changes to current psychiatric medication(s)     Medication Compliance:   Yes     Changes in Health Issues:   None reported     Chemical Use Review:   Substance Use: Chemical use reviewed, no active concerns identified Nothing used since August 2021.     Tobacco Use: No current tobacco use.      Diagnosis:  1. Bipolar 2 disorder (H)    2. Generalized anxiety disorder    3. Trauma and stressor-related disorder      Collateral Reports Completed:   Not Applicable    PLAN: (Patient Tasks / Therapist Tasks / Other)  Journal  Get in the pool  Get moving again  Look into a couples therapist     In the  future:  Get back to budgeting      There has been demonstrated improvement in functioning while patient has been engaged in psychotherapy/psychological service- if withdrawn the patient would deteriorate and/or relapse.     CRUZ MICAELA BAKER JO   2022                                                       ______________________________________________________________________    Individual Treatment Plan    Patient's Name: Randi Cleary  YOB: 1955    Date of Creation: 20  Date Treatment Plan Last Reviewed/Revised: 3/9/22    DSM5 Diagnoses: 296.89 Bipolar II Disorder Depressed, 300.02 (F41.1) Generalized Anxiety Disorder or Adjustment Disorders  309.89 (F43.8) Other Specified Trauma and Stressor Related Disorder  Psychosocial / Contextual Factors: Patient's entire family of origin has , she now has a sister-in-law and  as support.  Relationship with  is conflictual. She is recovering from surgeries  PROMIS (reviewed every 90 days): PROMIS-10 Scores  PROMIS 10-Global Health (only subscores and total score):   PROMIS-10 Scores Only 2021 2021 3/15/2022 3/15/2022 3/15/2022 3/24/2022 2022   Global Mental Health Score 6 6 6 6 6 8 12   Global Physical Health Score 9 9 9 9 9 8 11   PROMIS TOTAL - SUBSCORES 15 15 15 15 15 16 23          Referral / Collaboration:  Referral to another professional/service is not indicated at this time..    Anticipated number of session for this episode of care: 50  Anticipation frequency of session: Biweekly  Anticipated Duration of each session: 53 or more minutes due to intensity of trauma symptoms  Treatment plan will be reviewed in 90 days or when goals have been changed.   There has been demonstrated improvement in functioning while patient has been engaged in psychotherapy/psychological service- if withdrawn the patient would deteriorate and/or relapse.       MeasurableTreatment Goal(s) related to diagnosis / functional  "impairment(s)  Goal 1: Patient will \"jumpstart, getting going with the things I need to be doing around the house as far as picking up, doing things, trying to do something every day.  Also to lessen the animosity between me and my .\"    I will know I've met my goal when my shoulders are fixed and I can see.      Objective #A (Patient Action)    Patient will increase frequency of engaging her in ADLs.  Status: Continued - Date(s): 12/10/21, 3/9/22    Intervention(s)  Therapist will engage patient in CBT, specifically behavioral activation.    Objective #B  Patient will  track and record at least 5 pleasant exchanges with . Patient will be able to identify at least 5 positive traits about her  and how he relates to her.  Status: Continued - Date(s): 12/10/21, 3/9/22    Intervention(s)  Therapist will teach assertiveness skills and assign homework related to relationship interactions.    Objective #C  Patient will reduce level of depressive and anxious features as evidenced by reduction in score on her CHAVO-7 and PHQ-9 (scores of 15 and 16 at first measurment, respectively).  Status: Continued - Date(s): 12/10/21, 3/9/22    Intervention(s)  Therapist will engage patient in person-centered therapy and CBT.    Patient has reviewed and agreed to the above plan.      MICAELA SLADE  March 9, 2022                                                   Randi Cleary          SAFETY PLAN:  Step 1: Warning signs / cues (Thoughts, images, mood, situation, behavior) that a crisis may be developing:  ? Thoughts: \"I don't want to continue\" \"I am unwanted\"  ? Images: none  ? Thinking Processes: ruminating  ? Mood: anger  ? Behaviors: isolating/withdrawing , can't stop crying, not taking care of myself and not taking care of my responsibilities  ? Situations: small triggers, such as not being able to find something, or dropping something   Step 2: Coping strategies - Things I can do to take my mind off of my " "problems without contacting another person (relaxation technique, physical activity):  ? Distress Tolerance Strategies:  arts and crafts: drawing, play with my pet , listen to positive and upbeat music: any, change body temperature (ice pack/cold water)  and paced breathing/progressive muscle relaxation  ? Physical Activities: go for a walk, deep breathing and stretching   ? Focus on helpful thoughts:  \"You've been through this before, you can get through it again.\"  Step 3: People and social settings that provide distraction:                 Name: Carmen                            Name: [place]en                           Name: Aleida       ? pool, shopping, Carmen's house, [employer]       Step 4: Remind myself of people and things that are important to me and worth living for:  S[place], Aleida Horton, post-COVID world, options of what could be in your future        Step 5: When I am in crisis, I can ask these people to help me use my safety plan:                 Name: Sidney  Step 6: Making the environment safe:   ? go to sleep/daydream  Step 7: Professionals or agencies I can contact during a crisis:  ? [facility] Daytime Number: [phone]  ? Suicide Prevention Lifeline: 6-437-923-TALK (8940)  ? Crisis Text Line Service (available 24 hours a day, 7 days a week): Text [place] to 562841    Local Crisis Services: [facility] Crisis: [phone]     Call 911 or go to my nearest emergency department.       I helped develop this safety plan and agree to use it when needed.  I have been given a copy of this plan.       Client signature _________________________________________________________________  Today s date:  11/24/2020  Adapted from Safety Plan Template 2008 Randi Poole and Robby Barba is reprinted with the express permission of the authors.  No portion of the Safety Plan Template may be reproduced without the express, written permission.  You can contact the authors at bhs@[email or web address].[facility] [place] or " [email or web address].

## 2022-06-07 NOTE — PROGRESS NOTES
[facility] Follow Up Note    Assessment/Plan:  1. Acute bronchitis, unspecified organism  Follow-up for acute bronchitis-on day 3 of antibiotics and a 5 of prednisone.  Lungs with expiratory wheezing.  Recommendation: Follow through on plan as per Dr. Constantino.  Increase prednisone to 40 mg daily for the next 5 days.  Begin albuterol nebulizers twice daily instead of her inhaler.  We will check chest x-ray.  Defer COVID booster until this infection has cleared  - XR Chest 2 Views; Future: Chest x-ray reviewed and negative for pneumonia or congestive heart failure.    2. Type 2 diabetes mellitus without complication, without long-term current use of insulin (H)  We will update lab  - HEMOGLOBIN A1C; Future    3. Screening for hyperlipidemia  We will update labs  - Lipid panel reflex to direct LDL Fasting; Future    4. Hemochromatosis, unspecified hemochromatosis type  We will update labs.  - Basic metabolic panel  (Ca, Cl, CO2, Creat, Gluc, K, Na, BUN); Future  - Hepatic function panel; Future    Additionally, she complains of many things including weight gain, wrist pain and neck pain.  I have been encouraging her to follow-up with her pain doctor as she is already on narcotics.  She may use Voltaren gel for the left wrist pain-results of MRI reviewed.  Encouraged follow-up with neurosurgeon after respiratory symptoms have subsided      Loida Stockton MD, MD    Chief Complaint:  Chief Complaint   Patient presents with     Follow Up       History of Present Illness:  Randi is a 66 year old female who is seen here today for an acute respiratory illness.  However, she perseverates with regard to other complaints.    She has had about a 1 week history of cough with wheezing and shortness of breath.  She denies fever.  She states her home COVID test has been negative.  Of note, her  is a  for school-aged children.  She was seen remotely by Dr. Constantino on June 2.  She was given a  prescription for prednisone and azithromycin.  She is on days 3 of the Zithromax.  She is on about day 5 of the prednisone.  She states her sputum has cleared in color.  It is no longer green.  However, she continues to experience congestion.  She has shortness with exertion.  She is coughing throughout the interview.  She is a non-smoker.  She has underlying COPD.    Additionally, she has many other complaints.  She is being followed by [place] [facility] for left wrist pain.  She has underlying carpal tunnel but also radicular symptoms from cyst cervical stenosis.  She has hemochromatosis.  She wants to discuss her bone scan/MRI of her wrist.    She is under the care of multiple physicians including Dr. Dubois.  She states her pain is not well controlled especially wrist pain.  She is on Vicodin and Tylenol.  She is intolerant of gabapentin    Review of Systems:  A comprehensive review of systems was performed and was otherwise negative    PFSH:  Social History:  with no children  History   Smoking Status     Former Smoker     Packs/day: 2.50     Years: 20.00     Types: Cigarettes     Start date: 5/1/1971     Quit date: 6/29/2000   Smokeless Tobacco     Never Used       Past History:   Current Outpatient Medications   Medication Sig Dispense Refill     acetaminophen (TYLENOL) 325 MG tablet Take 2 tablets (650 mg) by mouth every 4 hours as needed for other (For optimal non-opioid multimodal pain management to improve pain control.)       albuterol (PROVENTIL) (2.5 MG/3ML) 0.083% neb solution Take 1 vial (2.5 mg) by nebulization every 4 hours as needed for shortness of breath / dyspnea or wheezing 360 mL 5     albuterol (VENTOLIN HFA) 108 (90 Base) MCG/ACT inhaler Inhale 2 puffs into the lungs every 6 hours 18 g 11     azithromycin (ZITHROMAX) 500 MG tablet Take 1 tablet (500 mg) by mouth daily for 7 days 7 tablet 0     benzonatate (TESSALON) 200 MG capsule Take 1 capsule (200 mg) by mouth 3 times  daily as needed for cough 20 capsule 1     Cholecalciferol (VITAMIN D3) 250 MCG (35020 UT) TABS Take 1 tablet by mouth daily 09718/day       Cyanocobalamin (VITAMIN B-12) 5000 MCG SUBL Place 2-3 sprays under the tongue daily Unknown dose. 2 or 3 sprays/day       ethacrynic acid (EDECRIN) 25 MG tablet Take 1 tablet by mouth on Saturday and Wednesday 30 tablet 11     Fluticasone-Umeclidin-Vilanterol (TRELEGY ELLIPTA) 200-62.5-25 MCG/INH oral inhaler Inhale 1 puff into the lungs daily 1 each 11     guaiFENesin (MUCINEX) 600 MG 12 hr tablet Take 1 tablet (600 mg) by mouth 2 times daily 60 tablet 2     HYDROcodone-acetaminophen (NORCO) 5-325 MG tablet Take 1 tablet by mouth 3 times daily as needed for breakthrough pain or moderate to severe pain 70 tablet 0     LITHIUM PO Take 25 mg by mouth daily OTC lithium oratate       losartan (COZAAR) 25 MG tablet Take 1 tablet (25 mg) by mouth daily 90 tablet 2     magnesium 250 MG tablet Take 1 tablet by mouth 2 times daily       medical cannabis (Patient's own supply) See Admin Instructions (The purpose of this order is to document that the patient reports taking medical cannabis.  This is not a prescription, and is not used to certify that the patient has a qualifying medical condition.)       methocarbamol (ROBAXIN) 500 MG tablet Take 1 tablet (500 mg) by mouth 4 times daily Wean down on your frequency. - May fill/start 3/31/22 112 tablet 3     omeprazole (PRILOSEC) 20 MG DR capsule Take 20 mg by mouth daily        OXcarbazepine (TRILEPTAL) 150 MG tablet Take one tablet daily at bedtime 30 tablet 3     potassium chloride ER (KLOR-CON M) 20 MEQ CR tablet Take 1 tablet (20 mEq) by mouth daily 90 tablet 3     predniSONE (DELTASONE) 20 MG tablet Take 1 tablet (20 mg) by mouth every morning for 7 days 30 tablet 1     rOPINIRole (REQUIP) 2 MG tablet Take 1-2 tablets (2-4 mg) by mouth At Bedtime 120 tablet 3     SYNTHROID 150 MCG tablet Take 1 tablet (150 mcg) by mouth daily 90  "tablet 1     vitamin E 400 units TABS Take 800 Units by mouth daily         Family History:     Physical Exam:  General Appearance:   She is coughing throughout the interview.  She has stable vital  Vitals:    06/07/22 0705   BP: 130/76   BP Location: Left arm   Patient Position: Sitting   Cuff Size: Adult Large   Pulse: 104   Resp: 22   SpO2: 96%   Weight: 109.6 kg (241 lb 9.6 oz)   Height: 1.676 m (5' 6\")     Wt Readings from Last 3 Encounters:   06/07/22 109.6 kg (241 lb 9.6 oz)   05/23/22 116.1 kg (256 lb)   04/06/22 116.1 kg (256 lb)     Body mass index is 39 kg/m .    Lung exam reveals normal breath sounds with inspiration.  With forced vital capacity maneuver there is significant wheezing and congestion.  Cardiac exam reveals regular rate and rhythm.    Data Review:    Analysis and Summary of Old Records (2): Have reviewed all records of specialists    Records Requested (1):       Other History Summarized (from other people in the room) (2):     Radiology Tests Summarized (XRAY/CT/MRI/DXA) (1): Ordered chest x-ray    Labs Reviewed (1): Ordered lab    Medicine Tests Reviewed (EKG/ECHO/COLONOSCOPY/EGD) (1):     Independent Review of EKG or X-RAY (2): Personal review of chest x-ray      Answers for HPI/ROS submitted by the patient on 6/7/2022  How many servings of fruits and vegetables do you eat daily?: 2-3  On average, how many sweetened beverages do you drink each day (Examples: soda, juice, sweet tea, etc.  Do NOT count diet or artificially sweetened beverages)?: 0  How many minutes a day do you exercise enough to make your heart beat faster?: 9 or less  How many days a week do you exercise enough to make your heart beat faster?: 3 or less  How many days per week do you miss taking your medication?: 0  What is the reason for your visit today?: COPD, Covid, wrist MRI, Neck/shoulder  When did your symptoms begin?: More than a month  What are your symptoms?: neuropathy, numb hand, pain  How would you describe " these symptoms?: Moderate  Are your symptoms:: Worsening  Have you had these symptoms before?: Yes  Have you tried or received treatment for these symptoms before?: Yes  Did that treatment work? : No  Is there anything that makes you feel worse?: use  Is there anything that makes you feel better?: gentle use & massage

## 2022-06-07 NOTE — PROGRESS NOTES
Answers for HPI/ROS submitted by the patient on 6/7/2022  How many servings of fruits and vegetables do you eat daily?: 2-3  On average, how many sweetened beverages do you drink each day (Examples: soda, juice, sweet tea, etc.  Do NOT count diet or artificially sweetened beverages)?: 0  How many minutes a day do you exercise enough to make your heart beat faster?: 9 or less  How many days a week do you exercise enough to make your heart beat faster?: 3 or less  How many days per week do you miss taking your medication?: 0  What is the reason for your visit today?: COPD, Covid, wrist MRI, Neck/shoulder  When did your symptoms begin?: More than a month  What are your symptoms?: neuropathy, numb hand, pain  How would you describe these symptoms?: Moderate  Are your symptoms:: Worsening  Have you had these symptoms before?: Yes  Have you tried or received treatment for these symptoms before?: Yes  Did that treatment work? : No  Is there anything that makes you feel worse?: use  Is there anything that makes you feel better?: gentle use & massage

## 2022-06-08 LAB
BASOPHILS # BLD AUTO: 0.1 10E3/UL (ref 0–0.2)
BASOPHILS NFR BLD AUTO: 1 %
EOSINOPHIL # BLD AUTO: 0 10E3/UL (ref 0–0.7)
EOSINOPHIL NFR BLD AUTO: 1 %
ERYTHROCYTE [DISTWIDTH] IN BLOOD BY AUTOMATED COUNT: 12.1 % (ref 10–15)
FERRITIN SERPL-MCNC: 125 NG/ML (ref 10–130)
HCT VFR BLD AUTO: 48.2 % (ref 35–47)
HGB BLD-MCNC: 16.4 G/DL (ref 11.7–15.7)
IMM GRANULOCYTES # BLD: 0.1 10E3/UL
IMM GRANULOCYTES NFR BLD: 2 %
IRON SATN MFR SERPL: 79 % (ref 15–46)
IRON SERPL-MCNC: 216 UG/DL (ref 35–180)
LYMPHOCYTES # BLD AUTO: 1.6 10E3/UL (ref 0.8–5.3)
LYMPHOCYTES NFR BLD AUTO: 24 %
MCH RBC QN AUTO: 33.1 PG (ref 26.5–33)
MCHC RBC AUTO-ENTMCNC: 34 G/DL (ref 31.5–36.5)
MCV RBC AUTO: 97 FL (ref 78–100)
MONOCYTES # BLD AUTO: 0.5 10E3/UL (ref 0–1.3)
MONOCYTES NFR BLD AUTO: 8 %
NEUTROPHILS # BLD AUTO: 4.3 10E3/UL (ref 1.6–8.3)
NEUTROPHILS NFR BLD AUTO: 64 %
NRBC # BLD AUTO: 0 10E3/UL
NRBC BLD AUTO-RTO: 0 /100
PLATELET # BLD AUTO: 236 10E3/UL (ref 150–450)
RBC # BLD AUTO: 4.95 10E6/UL (ref 3.8–5.2)
RETICS # AUTO: 0.08 10E6/UL (ref 0.01–0.11)
RETICS/RBC NFR AUTO: 1.6 % (ref 0.8–2.7)
TIBC SERPL-MCNC: 275 UG/DL (ref 240–430)
WBC # BLD AUTO: 6.6 10E3/UL (ref 4–11)

## 2022-06-09 ENCOUNTER — VIRTUAL VISIT (OUTPATIENT)
Dept: PSYCHOLOGY | Facility: CLINIC | Age: 67
End: 2022-06-09
Payer: MEDICARE

## 2022-06-09 DIAGNOSIS — F31.81 BIPOLAR 2 DISORDER (H): Primary | ICD-10-CM

## 2022-06-09 DIAGNOSIS — F41.1 GENERALIZED ANXIETY DISORDER: ICD-10-CM

## 2022-06-09 DIAGNOSIS — F43.9 TRAUMA AND STRESSOR-RELATED DISORDER: ICD-10-CM

## 2022-06-09 PROCEDURE — 90834 PSYTX W PT 45 MINUTES: CPT | Mod: 95 | Performed by: SOCIAL WORKER

## 2022-06-09 ASSESSMENT — ANXIETY QUESTIONNAIRES
3. WORRYING TOO MUCH ABOUT DIFFERENT THINGS: MORE THAN HALF THE DAYS
8. IF YOU CHECKED OFF ANY PROBLEMS, HOW DIFFICULT HAVE THESE MADE IT FOR YOU TO DO YOUR WORK, TAKE CARE OF THINGS AT HOME, OR GET ALONG WITH OTHER PEOPLE?: SOMEWHAT DIFFICULT
GAD7 TOTAL SCORE: 14
1. FEELING NERVOUS, ANXIOUS, OR ON EDGE: MORE THAN HALF THE DAYS
2. NOT BEING ABLE TO STOP OR CONTROL WORRYING: MORE THAN HALF THE DAYS
4. TROUBLE RELAXING: MORE THAN HALF THE DAYS
7. FEELING AFRAID AS IF SOMETHING AWFUL MIGHT HAPPEN: MORE THAN HALF THE DAYS
5. BEING SO RESTLESS THAT IT IS HARD TO SIT STILL: MORE THAN HALF THE DAYS
7. FEELING AFRAID AS IF SOMETHING AWFUL MIGHT HAPPEN: MORE THAN HALF THE DAYS
6. BECOMING EASILY ANNOYED OR IRRITABLE: MORE THAN HALF THE DAYS
GAD7 TOTAL SCORE: 14
GAD7 TOTAL SCORE: 14

## 2022-06-09 NOTE — PROGRESS NOTES
[facility]                                     Progress Note    Patient Name: Randi Cleary  Date: 6/9/22         Service Type: Individual     Session Start Time: 10:36 AM  Session End Time: 11:21 AM     Session Length: 45 minutes    Session #: 113    Attendees: Client attended alone    Service Modality:  Video Visit:      Provider verified identity through the following two step process.  Patient provided:  Patient is known previously to provider    Telemedicine Visit: The patient's condition can be safely assessed and treated via synchronous audio and visual telemedicine encounter.      Reason for Telemedicine Visit: Services only offered telehealth    Originating Site (Patient Location): Patient's home    Distant Site (Provider Location): Provider Remote Setting- Home Office    Consent:  The patient/guardian has verbally consented to: the potential risks and benefits of telemedicine (video visit) versus in person care; bill my insurance or make self-payment for services provided; and responsibility for payment of non-covered services.     Patient would like the video invitation sent by:  My Chart    Mode of Communication:  Video Conference via [employer]    As the provider I attest to compliance with applicable laws and regulations related to telemedicine.    DATA  Extended Session (53+ minutes): No  Interactive Complexity: No  Crisis: No        Progress Since Last Session (Related to Symptoms / Goals / Homework):   Symptoms: Patient is still physically sick and stressed    Homework: Progress made  Journal  Get in the pool  Get moving again  Look into a couples therapist     In the future:  Get back to budgeting     Episode of Care Goals: Satisfactory progress - ACTION (Actively working towards change); Intervened by reinforcing change plan / affirming steps taken     Current / Ongoing Stressors and Concerns:   Patient is currently socially isolated. She has a conflictual relationship with  her .  She is getting minimal physical activity.  She had recent eye surgery and shoulder surgery.     Treatment Objective(s) Addressed in This Session:   Patient will increase frequency of engaging her in ADLs.  Patient will track and record at least 5 pleasant exchanges with . Patient will be able to identify at least 5 positive traits about her .  Patient will reduce level of depressive and anxious features as evidenced by reduction in score on her CHVAO-7 and PHQ-9 (scores of 15 and 16 at first measurment, respectively).     Intervention:   Internal Family Systems Therapy: Processed how patient is caring for herself and how to keep this up. Reviewed homework and identified successes and barriers to completion. Talked about sorting through all of her medical appointments and prioritizing what she needed to.    The following assessments were completed by patient for this visit:  PHQ9: of note, patient reported she was in a hurry and didn't read all questions, she reports no suicidal ideation  PHQ-9 SCORE 4/18/2022 5/2/2022 5/6/2022 5/10/2022 5/12/2022 5/16/2022 6/9/2022   PHQ-9 Total Score MyChart 18 (Moderately severe depression) 20 (Severe depression) 18 (Moderately severe depression) 18 (Moderately severe depression) 15 (Moderately severe depression) 19 (Moderately severe depression) 18 (Moderately severe depression)   PHQ-9 Total Score 18 20 18 18 15 19 18   Some encounter information is confidential and restricted. Go to Review [employer] activity to see all data.     GAD7:   CHAVO-7 SCORE 4/12/2022 5/2/2022 5/2/2022 5/10/2022 5/10/2022 5/10/2022 6/9/2022   Total Score 15 (severe anxiety) - 16 (severe anxiety) - - 18 (severe anxiety) 14 (moderate anxiety)   Total Score 15 16 16 18 18 18 14   Some encounter information is confidential and restricted. Go to Review [employer] activity to see all data.     PROMIS 10-Global Health (only subscores and total score):   PROMIS-10 Scores Only  12/14/2021 3/15/2022 3/15/2022 3/15/2022 3/24/2022 4/1/2022 6/9/2022   Global Mental Health Score 6 6 6 6 8 12 12   Global Physical Health Score 9 9 9 9 8 11 10   PROMIS TOTAL - SUBSCORES 15 15 15 15 16 23 22        ASSESSMENT: Current Emotional / Mental Status (status of significant symptoms):   Risk status (Self / Other harm or suicidal ideation)   Patient denies current fears or concerns for personal safety.   Patient denies current or recent suicidal ideation or behaviors.   Patient denies current or recent homicidal ideation or behaviors.   Patient denies current or recent self injurious behavior or ideation.   Patient denies other safety concerns.   Patient reports there has been no change in risk factors since their last session.     Patient reports there has been no change in protective factors since their last session.     A safety and risk management plan has been developed including: Patient has no change in safety concerns. Committed to safety and agreed to follow previously developed safety plan..  Patient consented to co-developed safety plan.  Safety and risk management plan was completed.  Patient agreed to use safety plan should any safety concerns arise.  A copy was given to the patient.     Appearance:   Appropriate    Eye Contact:   Good    Psychomotor Behavior: Normal    Attitude:   Cooperative    Orientation:   All   Speech    Rate / Production: Normal/ Responsive    Volume:  Normal    Mood:    Normal   Affect:    Appropriate    Thought Content:  Clear    Thought Form:  Coherent    Insight:    Good      Medication Review:   No changes to current psychiatric medication(s)     Medication Compliance:   Yes     Changes in Health Issues:   None reported     Chemical Use Review:   Substance Use: Chemical use reviewed, no active concerns identified Nothing used since August 2021.     Tobacco Use: No current tobacco use.      Diagnosis:  1. Bipolar 2 disorder (H)    2. Generalized anxiety disorder     3. Trauma and stressor-related disorder      Collateral Reports Completed:   Not Applicable    PLAN: (Patient Tasks / Therapist Tasks / Other)  Journal  Get in the pool  Get moving again  Look into a couples therapist     In the future:  Get back to budgeting      There has been demonstrated improvement in functioning while patient has been engaged in psychotherapy/psychological service- if withdrawn the patient would deteriorate and/or relapse.     CRUZ MICAELA BAKER JO   2022                                                       ______________________________________________________________________    Individual Treatment Plan    Patient's Name: Randi Cleary  YOB: 1955    Date of Creation: 20  Date Treatment Plan Last Reviewed/Revised: 22    DSM5 Diagnoses: 296.89 Bipolar II Disorder Depressed, 300.02 (F41.1) Generalized Anxiety Disorder or Adjustment Disorders  309.89 (F43.8) Other Specified Trauma and Stressor Related Disorder  Psychosocial / Contextual Factors: Patient's entire family of origin has , she now has a sister-in-law and  as support.  Relationship with  is conflictual. She is recovering from surgeries  PROMIS (reviewed every 90 days): PROMIS-10 Scores  PROMIS 10-Global Health (only subscores and total score):   PROMIS-10 Scores Only 2021 3/15/2022 3/15/2022 3/15/2022 3/24/2022 2022 2022   Global Mental Health Score 6 6 6 6 8 12 12   Global Physical Health Score 9 9 9 9 8 11 10   PROMIS TOTAL - SUBSCORES 15 15 15 15 16 23 22       Referral / Collaboration:  Referral to another professional/service is not indicated at this time..    Anticipated number of session for this episode of care: 50  Anticipation frequency of session: Biweekly  Anticipated Duration of each session: 53 or more minutes due to intensity of trauma symptoms  Treatment plan will be reviewed in 90 days or when goals have been changed.   There has been demonstrated  "improvement in functioning while patient has been engaged in psychotherapy/psychological service- if withdrawn the patient would deteriorate and/or relapse.       MeasurableTreatment Goal(s) related to diagnosis / functional impairment(s)  Goal 1: Patient will \"jumpstart, getting going with the things I need to be doing around the house as far as picking up, doing things, trying to do something every day.  Also to lessen the animosity between me and my .\"    I will know I've met my goal when my shoulders are fixed and I can see.      Objective #A (Patient Action)    Patient will increase frequency of engaging her in ADLs.  Status: Continued - Date(s): 12/10/21, 3/9/22, 6/9/22    Intervention(s)  Therapist will engage patient in CBT, specifically behavioral activation.    Objective #B  Patient will  track and record at least 5 pleasant exchanges with . Patient will be able to identify at least 5 positive traits about her  and how he relates to her.  Status: Continued - Date(s): 12/10/21, 3/9/22, 6/9/22    Intervention(s)  Therapist will teach assertiveness skills and assign homework related to relationship interactions.    Objective #C  Patient will reduce level of depressive and anxious features as evidenced by reduction in score on her CHAVO-7 and PHQ-9 (scores of 15 and 16 at first measurment, respectively).  Status: Continued - Date(s): 12/10/21, 3/9/22, 6/9/22    Intervention(s)  Therapist will engage patient in person-centered therapy and CBT.    Patient has reviewed and agreed to the above plan.      MICAELA SLADE  June 9, 2022                                                   Randi Cleary          SAFETY PLAN:  Step 1: Warning signs / cues (Thoughts, images, mood, situation, behavior) that a crisis may be developing:  ? Thoughts: \"I don't want to continue\" \"I am unwanted\"  ? Images: none  ? Thinking Processes: ruminating  ? Mood: anger  ? Behaviors: isolating/withdrawing , can't " "stop crying, not taking care of myself and not taking care of my responsibilities  ? Situations: small triggers, such as not being able to find something, or dropping something   Step 2: Coping strategies - Things I can do to take my mind off of my problems without contacting another person (relaxation technique, physical activity):  ? Distress Tolerance Strategies:  arts and crafts: drawing, play with my pet , listen to positive and upbeat music: any, change body temperature (ice pack/cold water)  and paced breathing/progressive muscle relaxation  ? Physical Activities: go for a walk, deep breathing and stretching   ? Focus on helpful thoughts:  \"You've been through this before, you can get through it again.\"  Step 3: People and social settings that provide distraction:                 Name: Carmen                            Name: Darien                           Name: Aleida       ? pool, shopping, Carmen's house, [employer]       Step 4: Remind myself of people and things that are important to me and worth living for:  Sidney, Aleida Horton, post-COVID world, options of what could be in your future        Step 5: When I am in crisis, I can ask these people to help me use my safety plan:                 Name: Sidney  Step 6: Making the environment safe:   ? go to sleep/daydream  Step 7: Professionals or agencies I can contact during a crisis:  ? [facility] Daytime Number: [phone]  ? Suicide Prevention Lifeline: 6-132-239-JCTH (6601)  ? Crisis Text Line Service (available 24 hours a day, 7 days a week): Text [place] to 380479    Local Crisis Services: [facility] Crisis: [phone]     Call 911 or go to my nearest emergency department.       I helped develop this safety plan and agree to use it when needed.  I have been given a copy of this plan.       Client signature _________________________________________________________________  Today s date:  11/24/2020  Adapted from Safety Plan Template 2008 " Randi Poole and Robby Barba is reprinted with the express permission of the authors.  No portion of the Safety Plan Template may be reproduced without the express, written permission.  You can contact the authors at [email or web address] or [email or web address].

## 2022-06-14 ENCOUNTER — VIRTUAL VISIT (OUTPATIENT)
Dept: PSYCHOLOGY | Facility: CLINIC | Age: 67
End: 2022-06-14
Payer: MEDICARE

## 2022-06-14 DIAGNOSIS — F41.1 GENERALIZED ANXIETY DISORDER: ICD-10-CM

## 2022-06-14 DIAGNOSIS — F43.9 TRAUMA AND STRESSOR-RELATED DISORDER: ICD-10-CM

## 2022-06-14 DIAGNOSIS — F31.81 BIPOLAR 2 DISORDER (H): Primary | ICD-10-CM

## 2022-06-14 PROCEDURE — 90834 PSYTX W PT 45 MINUTES: CPT | Mod: 95 | Performed by: SOCIAL WORKER

## 2022-06-14 NOTE — PROGRESS NOTES
[facility]                                     Progress Note    Patient Name: Randi Cleary  Date: 6/14/22         Service Type: Individual     Session Start Time: 10:32 AM  Session End Time: 11:22 AM     Session Length: 50 minutes    Session #: 114    Attendees: Client attended alone    Service Modality:  Video Visit:      Provider verified identity through the following two step process.  Patient provided:  Patient is known previously to provider    Telemedicine Visit: The patient's condition can be safely assessed and treated via synchronous audio and visual telemedicine encounter.      Reason for Telemedicine Visit: Services only offered telehealth    Originating Site (Patient Location): Patient's home    Distant Site (Provider Location): Provider Remote Setting- Home Office    Consent:  The patient/guardian has verbally consented to: the potential risks and benefits of telemedicine (video visit) versus in person care; bill my insurance or make self-payment for services provided; and responsibility for payment of non-covered services.     Patient would like the video invitation sent by:  My Chart    Mode of Communication:  Video Conference via [facility]well    As the provider I attest to compliance with applicable laws and regulations related to telemedicine.    DATA  Extended Session (53+ minutes): No  Interactive Complexity: No  Crisis: No        Progress Since Last Session (Related to Symptoms / Goals / Homework):   Symptoms: Patient is still physically sick and stressed    Homework: Progress made  Journal -done  Get in the pool -not done  Get moving again -done  Look into a couples therapist -not done     In the future:  Get back to budgeting     Episode of Care Goals: Satisfactory progress - ACTION (Actively working towards change); Intervened by reinforcing change plan / affirming steps taken     Current / Ongoing Stressors and Concerns:   Patient is currently socially isolated. She has  a conflictual relationship with her .  She is getting minimal physical activity.  She had recent eye surgery and shoulder surgery.     Treatment Objective(s) Addressed in This Session:   Patient will increase frequency of engaging her in ADLs.  Patient will track and record at least 5 pleasant exchanges with . Patient will be able to identify at least 5 positive traits about her .  Patient will reduce level of depressive and anxious features as evidenced by reduction in score on her CHAVO-7 and PHQ-9 (scores of 15 and 16 at first measurment, respectively).     Intervention:   Supportive Therapy: Talked about how she's still struggling with her health. Processed relationships. Talked about how she keeps moving forward even when she's struggling. Discussed next steps to keep getting things done and to work further on her marriage.      The following assessments were completed by patient for this visit:  PHQ9: of note, patient reported she was in a hurry and didn't read all questions, she reports no suicidal ideation  PHQ-9 SCORE 5/2/2022 5/6/2022 5/10/2022 5/12/2022 5/16/2022 6/9/2022 6/14/2022   PHQ-9 Total Score MyChart 20 (Severe depression) 18 (Moderately severe depression) 18 (Moderately severe depression) 15 (Moderately severe depression) 19 (Moderately severe depression) 18 (Moderately severe depression) 12 (Moderate depression)   PHQ-9 Total Score 20 18 18 15 19 18 12   Some encounter information is confidential and restricted. Go to Review Information Gateway activity to see all data.     GAD7:   CHAVO-7 SCORE 4/12/2022 5/2/2022 5/2/2022 5/10/2022 5/10/2022 5/10/2022 6/9/2022   Total Score 15 (severe anxiety) - 16 (severe anxiety) - - 18 (severe anxiety) 14 (moderate anxiety)   Total Score 15 16 16 18 18 18 14   Some encounter information is confidential and restricted. Go to Review White Opsheets activity to see all data.     PROMIS 10-Global Health (only subscores and total score):   PROMIS-10 Scores  Only 12/14/2021 3/15/2022 3/15/2022 3/15/2022 3/24/2022 4/1/2022 6/9/2022   Global Mental Health Score 6 6 6 6 8 12 12   Global Physical Health Score 9 9 9 9 8 11 10   PROMIS TOTAL - SUBSCORES 15 15 15 15 16 23 22        ASSESSMENT: Current Emotional / Mental Status (status of significant symptoms):   Risk status (Self / Other harm or suicidal ideation)   Patient denies current fears or concerns for personal safety.   Patient denies current or recent suicidal ideation or behaviors.   Patient denies current or recent homicidal ideation or behaviors.   Patient denies current or recent self injurious behavior or ideation.   Patient denies other safety concerns.   Patient reports there has been no change in risk factors since their last session.     Patient reports there has been no change in protective factors since their last session.     A safety and risk management plan has been developed including: Patient has no change in safety concerns. Committed to safety and agreed to follow previously developed safety plan..  Patient consented to co-developed safety plan.  Safety and risk management plan was completed.  Patient agreed to use safety plan should any safety concerns arise.  A copy was given to the patient.     Appearance:   Appropriate    Eye Contact:   Good    Psychomotor Behavior: Normal    Attitude:   Cooperative    Orientation:   All   Speech    Rate / Production: Normal/ Responsive    Volume:  Normal    Mood:    Normal   Affect:    Appropriate    Thought Content:  Clear    Thought Form:  Coherent    Insight:    Good      Medication Review:   No changes to current psychiatric medication(s)     Medication Compliance:   Yes     Changes in Health Issues:   None reported     Chemical Use Review:   Substance Use: Chemical use reviewed, no active concerns identified Nothing used since August 2021.     Tobacco Use: No current tobacco use.      Diagnosis:  1. Bipolar 2 disorder (H)    2. Generalized anxiety disorder     3. Trauma and stressor-related disorder      Collateral Reports Completed:   Not Applicable    PLAN: (Patient Tasks / Therapist Tasks / Other)  Journal  Get in the pool  Get moving again  Look into a couples therapist     In the future:  Get back to budgeting      There has been demonstrated improvement in functioning while patient has been engaged in psychotherapy/psychological service- if withdrawn the patient would deteriorate and/or relapse.     CRUZ MICAELA BAKER JO   2022                                                       ______________________________________________________________________    Individual Treatment Plan    Patient's Name: Randi Cleary  YOB: 1955    Date of Creation: 20  Date Treatment Plan Last Reviewed/Revised: 22    DSM5 Diagnoses: 296.89 Bipolar II Disorder Depressed, 300.02 (F41.1) Generalized Anxiety Disorder or Adjustment Disorders  309.89 (F43.8) Other Specified Trauma and Stressor Related Disorder  Psychosocial / Contextual Factors: Patient's entire family of origin has , she now has a sister-in-law and  as support.  Relationship with  is conflictual. She is recovering from surgeries  PROMIS (reviewed every 90 days): PROMIS-10 Scores  PROMIS 10-Global Health (only subscores and total score):   PROMIS-10 Scores Only 2021 3/15/2022 3/15/2022 3/15/2022 3/24/2022 2022 2022   Global Mental Health Score 6 6 6 6 8 12 12   Global Physical Health Score 9 9 9 9 8 11 10   PROMIS TOTAL - SUBSCORES 15 15 15 15 16 23 22       Referral / Collaboration:  Referral to another professional/service is not indicated at this time..    Anticipated number of session for this episode of care: 50  Anticipation frequency of session: Biweekly  Anticipated Duration of each session: 53 or more minutes due to intensity of trauma symptoms  Treatment plan will be reviewed in 90 days or when goals have been changed.   There has been demonstrated  "improvement in functioning while patient has been engaged in psychotherapy/psychological service- if withdrawn the patient would deteriorate and/or relapse.       MeasurableTreatment Goal(s) related to diagnosis / functional impairment(s)  Goal 1: Patient will \"jumpstart, getting going with the things I need to be doing around the house as far as picking up, doing things, trying to do something every day.  Also to lessen the animosity between me and my .\"    I will know I've met my goal when my shoulders are fixed and I can see.      Objective #A (Patient Action)    Patient will increase frequency of engaging her in ADLs.  Status: Continued - Date(s): 12/10/21, 3/9/22, 6/9/22    Intervention(s)  Therapist will engage patient in CBT, specifically behavioral activation.    Objective #B  Patient will  track and record at least 5 pleasant exchanges with . Patient will be able to identify at least 5 positive traits about her  and how he relates to her.  Status: Continued - Date(s): 12/10/21, 3/9/22, 6/9/22    Intervention(s)  Therapist will teach assertiveness skills and assign homework related to relationship interactions.    Objective #C  Patient will reduce level of depressive and anxious features as evidenced by reduction in score on her CHAVO-7 and PHQ-9 (scores of 15 and 16 at first measurment, respectively).  Status: Continued - Date(s): 12/10/21, 3/9/22, 6/9/22    Intervention(s)  Therapist will engage patient in person-centered therapy and CBT.    Patient has reviewed and agreed to the above plan.      MICAELA SLADE  June 9, 2022                                                   Randi Cleary          SAFETY PLAN:  Step 1: Warning signs / cues (Thoughts, images, mood, situation, behavior) that a crisis may be developing:  ? Thoughts: \"I don't want to continue\" \"I am unwanted\"  ? Images: none  ? Thinking Processes: ruminating  ? Mood: anger  ? Behaviors: isolating/withdrawing , can't " "stop crying, not taking care of myself and not taking care of my responsibilities  ? Situations: small triggers, such as not being able to find something, or dropping something   Step 2: Coping strategies - Things I can do to take my mind off of my problems without contacting another person (relaxation technique, physical activity):  ? Distress Tolerance Strategies:  arts and crafts: drawing, play with my pet , listen to positive and upbeat music: any, change body temperature (ice pack/cold water)  and paced breathing/progressive muscle relaxation  ? Physical Activities: go for a walk, deep breathing and stretching   ? Focus on helpful thoughts:  \"You've been through this before, you can get through it again.\"  Step 3: People and social settings that provide distraction:                 Name: Carmen                            Name: D[place]en                           Name: Aleida       ? pool, shopping, Carmen's house, [employer]       Step 4: Remind myself of people and things that are important to me and worth living for:  Sid[place], Aleida Horton, post-COVID world, options of what could be in your future        Step 5: When I am in crisis, I can ask these people to help me use my safety plan:                 Name: Sidney  Step 6: Making the environment safe:   ? go to sleep/daydream  Step 7: Professionals or agencies I can contact during a crisis:  ? [facility] Daytime Number: [phone]  ? Suicide Prevention Lifeline: 6-894-198-VRVS (6060)  ? Crisis Text Line Service (available 24 hours a day, 7 days a week): Text [place] to 043576    Local Crisis Services: [facility] Crisis: [phone]     Call 911 or go to my nearest emergency department.       I helped develop this safety plan and agree to use it when needed.  I have been given a copy of this plan.       Client signature _________________________________________________________________  Today s date:  11/24/2020  Adapted from Safety Plan Template 2008 " Randi Poole and Robby Barba is reprinted with the express permission of the authors.  No portion of the Safety Plan Template may be reproduced without the express, written permission.  You can contact the authors at [email or web address] or [email or web address].

## 2022-06-21 ENCOUNTER — VIRTUAL VISIT (OUTPATIENT)
Dept: PSYCHOLOGY | Facility: CLINIC | Age: 67
End: 2022-06-21
Payer: MEDICARE

## 2022-06-21 ENCOUNTER — TELEPHONE (OUTPATIENT)
Dept: ONCOLOGY | Facility: CLINIC | Age: 67
End: 2022-06-21
Payer: MEDICARE

## 2022-06-21 DIAGNOSIS — F43.9 TRAUMA AND STRESSOR-RELATED DISORDER: ICD-10-CM

## 2022-06-21 DIAGNOSIS — F41.1 GENERALIZED ANXIETY DISORDER: ICD-10-CM

## 2022-06-21 DIAGNOSIS — F31.81 BIPOLAR 2 DISORDER (H): Primary | ICD-10-CM

## 2022-06-21 PROCEDURE — 90834 PSYTX W PT 45 MINUTES: CPT | Mod: 95 | Performed by: SOCIAL WORKER

## 2022-06-21 ASSESSMENT — PATIENT HEALTH QUESTIONNAIRE - PHQ9
SUM OF ALL RESPONSES TO PHQ QUESTIONS 1-9: 16
10. IF YOU CHECKED OFF ANY PROBLEMS, HOW DIFFICULT HAVE THESE PROBLEMS MADE IT FOR YOU TO DO YOUR WORK, TAKE CARE OF THINGS AT HOME, OR GET ALONG WITH OTHER PEOPLE: VERY DIFFICULT
SUM OF ALL RESPONSES TO PHQ QUESTIONS 1-9: 16

## 2022-06-21 NOTE — PROGRESS NOTES
[facility]                                     Progress Note    Patient Name: Randi Cleary  Date: 6/21/22         Service Type: Individual     Session Start Time: 10:32 AM  Session End Time: 11:22 AM     Session Length: 50 minutes    Session #: 115    Attendees: Client attended alone    Service Modality:  Video Visit:      Provider verified identity through the following two step process.  Patient provided:  Patient is known previously to provider    Telemedicine Visit: The patient's condition can be safely assessed and treated via synchronous audio and visual telemedicine encounter.      Reason for Telemedicine Visit: Services only offered telehealth    Originating Site (Patient Location): Patient's home    Distant Site (Provider Location): Provider Remote Setting- Home Office    Consent:  The patient/guardian has verbally consented to: the potential risks and benefits of telemedicine (video visit) versus in person care; bill my insurance or make self-payment for services provided; and responsibility for payment of non-covered services.     Patient would like the video invitation sent by:  My Chart    Mode of Communication:  Video Conference via [employer]well    As the provider I attest to compliance with applicable laws and regulations related to telemedicine.    DATA  Extended Session (53+ minutes): No  Interactive Complexity: No  Crisis: No        Progress Since Last Session (Related to Symptoms / Goals / Homework):   Symptoms: Patient is still physically sick and stressed    Homework: Progress made  Journal  Get in the pool  Get moving again  Look into a couples therapist     In the future:  Get back to budgeting     Episode of Care Goals: Satisfactory progress - ACTION (Actively working towards change); Intervened by reinforcing change plan / affirming steps taken     Current / Ongoing Stressors and Concerns:   Patient is currently socially isolated. She has a conflictual relationship with  her .  She is getting minimal physical activity.  She had recent eye surgery and shoulder surgery.     Treatment Objective(s) Addressed in This Session:   Patient will increase frequency of engaging her in ADLs.  Patient will track and record at least 5 pleasant exchanges with . Patient will be able to identify at least 5 positive traits about her .  Patient will reduce level of depressive and anxious features as evidenced by reduction in score on her CHAVO-7 and PHQ-9 (scores of 15 and 16 at first measurment, respectively).     Intervention:   Supportive Therapy: Patient is still sick with a cold. She also is frustrated as there is another bill for her health that has taken more of her time.  Talked about how to take care of herself right now, and suggested stop looking at productivity for the moment and instead get outside and enjoy time on the things she knows bring her britney.      The following assessments were completed by patient for this visit:  PHQ9: of note, patient reported she was in a hurry and didn't read all questions, she reports no suicidal ideation  PHQ-9 SCORE 5/6/2022 5/10/2022 5/12/2022 5/16/2022 6/9/2022 6/14/2022 6/21/2022   PHQ-9 Total Score MyChart 18 (Moderately severe depression) 18 (Moderately severe depression) 15 (Moderately severe depression) 19 (Moderately severe depression) 18 (Moderately severe depression) 12 (Moderate depression) 16 (Moderately severe depression)   PHQ-9 Total Score 18 18 15 19 18 12 16   Some encounter information is confidential and restricted. Go to Review [employer] activity to see all data.     GAD7:   CHAVO-7 SCORE 4/12/2022 5/2/2022 5/2/2022 5/10/2022 5/10/2022 5/10/2022 6/9/2022   Total Score 15 (severe anxiety) - 16 (severe anxiety) - - 18 (severe anxiety) 14 (moderate anxiety)   Total Score 15 16 16 18 18 18 14   Some encounter information is confidential and restricted. Go to [employer] activity to see all data.     PROMIS 10-Global  Health (only subscores and total score):   PROMIS-10 Scores Only 12/14/2021 3/15/2022 3/15/2022 3/15/2022 3/24/2022 4/1/2022 6/9/2022   Global Mental Health Score 6 6 6 6 8 12 12   Global Physical Health Score 9 9 9 9 8 11 10   PROMIS TOTAL - SUBSCORES 15 15 15 15 16 23 22        ASSESSMENT: Current Emotional / Mental Status (status of significant symptoms):   Risk status (Self / Other harm or suicidal ideation)   Patient denies current fears or concerns for personal safety.   Patient denies current or recent suicidal ideation or behaviors.   Patient denies current or recent homicidal ideation or behaviors.   Patient denies current or recent self injurious behavior or ideation.   Patient denies other safety concerns.   Patient reports there has been no change in risk factors since their last session.     Patient reports there has been no change in protective factors since their last session.     A safety and risk management plan has been developed including: Patient has no change in safety concerns. Committed to safety and agreed to follow previously developed safety plan..  Patient consented to co-developed safety plan.  Safety and risk management plan was completed.  Patient agreed to use safety plan should any safety concerns arise.  A copy was given to the patient.     Appearance:   Appropriate    Eye Contact:   Good    Psychomotor Behavior: Normal    Attitude:   Cooperative    Orientation:   All   Speech    Rate / Production: Normal/ Responsive    Volume:  Normal    Mood:    Normal   Affect:    Appropriate    Thought Content:  Clear    Thought Form:  Coherent    Insight:    Good      Medication Review:   No changes to current psychiatric medication(s)     Medication Compliance:   Yes     Changes in Health Issues:   None reported     Chemical Use Review:   Substance Use: Chemical use reviewed, no active concerns identified Nothing used since August 2021.     Tobacco Use: No current tobacco use.       Diagnosis:  1. Bipolar 2 disorder (H)    2. Generalized anxiety disorder    3. Trauma and stressor-related disorder      Collateral Reports Completed:   Not Applicable    PLAN: (Patient Tasks / Therapist Tasks / Other)  Spend time with outside on the porch.   Journal  Get in the pool  Get moving again   Look into a couples therapist     In the future:  Get back to budgeting      There has been demonstrated improvement in functioning while patient has been engaged in psychotherapy/psychological service- if withdrawn the patient would deteriorate and/or relapse.     MICAELA SLADE   2022                                                       ______________________________________________________________________    Individual Treatment Plan    Patient's Name: Randi Cleary  YOB: 1955    Date of Creation: 20  Date Treatment Plan Last Reviewed/Revised: 22    DSM5 Diagnoses: 296.89 Bipolar II Disorder Depressed, 300.02 (F41.1) Generalized Anxiety Disorder or Adjustment Disorders  309.89 (F43.8) Other Specified Trauma and Stressor Related Disorder  Psychosocial / Contextual Factors: Patient's entire family of origin has , she now has a sister-in-law and  as support.  Relationship with  is conflictual. She is recovering from surgeries  PROMIS (reviewed every 90 days): PROMIS-10 Scores  PROMIS 10-Global Health (only subscores and total score):   PROMIS-10 Scores Only 2021 3/15/2022 3/15/2022 3/15/2022 3/24/2022 2022 2022   Global Mental Health Score 6 6 6 6 8 12 12   Global Physical Health Score 9 9 9 9 8 11 10   PROMIS TOTAL - SUBSCORES 15 15 15 15 16 23 22       Referral / Collaboration:  Referral to another professional/service is not indicated at this time..    Anticipated number of session for this episode of care: 50  Anticipation frequency of session: Biweekly  Anticipated Duration of each session: 53 or more minutes due to intensity of trauma  "symptoms  Treatment plan will be reviewed in 90 days or when goals have been changed.   There has been demonstrated improvement in functioning while patient has been engaged in psychotherapy/psychological service- if withdrawn the patient would deteriorate and/or relapse.       MeasurableTreatment Goal(s) related to diagnosis / functional impairment(s)  Goal 1: Patient will \"jumpstart, getting going with the things I need to be doing around the house as far as picking up, doing things, trying to do something every day.  Also to lessen the animosity between me and my .\"    I will know I've met my goal when my shoulders are fixed and I can see.      Objective #A (Patient Action)    Patient will increase frequency of engaging her in ADLs.  Status: Continued - Date(s): 12/10/21, 3/9/22, 6/9/22    Intervention(s)  Therapist will engage patient in CBT, specifically behavioral activation.    Objective #B  Patient will  track and record at least 5 pleasant exchanges with . Patient will be able to identify at least 5 positive traits about her  and how he relates to her.  Status: Continued - Date(s): 12/10/21, 3/9/22, 6/9/22    Intervention(s)  Therapist will teach assertiveness skills and assign homework related to relationship interactions.    Objective #C  Patient will reduce level of depressive and anxious features as evidenced by reduction in score on her CHAVO-7 and PHQ-9 (scores of 15 and 16 at first measurment, respectively).  Status: Continued - Date(s): 12/10/21, 3/9/22, 6/9/22    Intervention(s)  Therapist will engage patient in person-centered therapy and CBT.    Patient has reviewed and agreed to the above plan.      MICAELA SLADE  June 9, 2022                                                   Randi Cleary          SAFETY PLAN:  Step 1: Warning signs / cues (Thoughts, images, mood, situation, behavior) that a crisis may be developing:  ? Thoughts: \"I don't want to continue\" \"I am " "unwanted\"  ? Images: none  ? Thinking Processes: ruminating  ? Mood: anger  ? Behaviors: isolating/withdrawing , can't stop crying, not taking care of myself and not taking care of my responsibilities  ? Situations: small triggers, such as not being able to find something, or dropping something   Step 2: Coping strategies - Things I can do to take my mind off of my problems without contacting another person (relaxation technique, physical activity):  ? Distress Tolerance Strategies:  arts and crafts: drawing, play with my pet , listen to positive and upbeat music: any, change body temperature (ice pack/cold water)  and paced breathing/progressive muscle relaxation  ? Physical Activities: go for a walk, deep breathing and stretching   ? Focus on helpful thoughts:  \"You've been through this before, you can get through it again.\"  Step 3: People and social settings that provide distraction:                 Name: Carmen                            Name: Darien                           Name: Aleida       ? pool, shopping, Carmen's house, [employer]       Step 4: Remind myself of people and things that are important to me and worth living for:  Clifford Little Donna, post-COVID world, options of what could be in your future        Step 5: When I am in crisis, I can ask these people to help me use my safety plan:                 Name: Sidney  Step 6: Making the environment safe:   ? go to sleep/daydream  Step 7: Professionals or agencies I can contact during a crisis:  ? [facility] Daytime Number: [phone]  ? Suicide Prevention Lifeline: 3-496-033-FOAR (5036)  ? Crisis Text Line Service (available 24 hours a day, 7 days a week): Text [place] to 428192    Local Crisis Services: [facility] Crisis: [phone]     Call 911 or go to my nearest emergency department.       I helped develop this safety plan and agree to use it when needed.  I have been given a copy of this plan.       Client " signature _________________________________________________________________  Today s date:  11/24/2020  Adapted from Safety Plan Template 2008 Randi Poole and Robby Barba is reprinted with the express permission of the authors.  No portion of the Safety Plan Template may be reproduced without the express, written permission.  You can contact the authors at [email or web address] or [email or web address].

## 2022-06-22 ENCOUNTER — OFFICE VISIT (OUTPATIENT)
Dept: PALLIATIVE MEDICINE | Facility: OTHER | Age: 67
End: 2022-06-22
Payer: MEDICARE

## 2022-06-22 VITALS
OXYGEN SATURATION: 95 % | DIASTOLIC BLOOD PRESSURE: 76 MMHG | HEIGHT: 66 IN | BODY MASS INDEX: 40.34 KG/M2 | WEIGHT: 251 LBS | HEART RATE: 102 BPM | SYSTOLIC BLOOD PRESSURE: 130 MMHG

## 2022-06-22 DIAGNOSIS — G95.20 CORD COMPRESSION (H): ICD-10-CM

## 2022-06-22 DIAGNOSIS — Z79.891 LONG TERM (CURRENT) USE OF OPIATE ANALGESIC: ICD-10-CM

## 2022-06-22 PROCEDURE — 99214 OFFICE O/P EST MOD 30 MIN: CPT | Performed by: ANESTHESIOLOGY

## 2022-06-22 PROCEDURE — G0463 HOSPITAL OUTPT CLINIC VISIT: HCPCS

## 2022-06-22 RX ORDER — HYDROCODONE BITARTRATE AND ACETAMINOPHEN 5; 325 MG/1; MG/1
1 TABLET ORAL 3 TIMES DAILY PRN
Qty: 70 TABLET | Refills: 0 | Status: SHIPPED | OUTPATIENT
Start: 2022-06-22 | End: 2022-07-28

## 2022-06-22 RX ORDER — HYDROXYZINE HYDROCHLORIDE 25 MG/1
1 TABLET, FILM COATED ORAL DAILY PRN
COMMUNITY
Start: 2022-03-21 | End: 2023-04-11

## 2022-06-22 ASSESSMENT — PAIN SCALES - GENERAL: PAINLEVEL: SEVERE PAIN (7)

## 2022-06-22 NOTE — PATIENT INSTRUCTIONS
PLAN:    Will contact your primary care provider with concern for a rash on your chest and abdomen and back.    At checkout schedule for occipital nerve blocks to help with head and neck pain.    You will contact [facility] to review next steps for your right carpal tunnel syndrome.    You will be having an assessment at physical therapy at [facility] to determine when you should move on from treatment for your neck to address your shoulder concerns.    You are trying different preparations of medical cannabis to find the one most helpful for mood and anxiety.    Continue with hydrocodone 5 mg up to 3 times a day as needed.  Discussed your goal to be more active at the pool and decrease the hydrocodone use.    May continue with the oxcarbazepine 150 mg at bedtime and the lithium orotate 10 mg at bedtime.    Continue working with your psychotherapist.    Follow-up with Dr. Dubois in 2 months
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
## 2022-06-22 NOTE — PROGRESS NOTES
[facility] Number:  [phone]    Call with any questions about your care and for scheduling assistance.     Calls are returned Monday through Friday between 8 AM and 4:30 PM. We usually get back to you within 2 business days depending on the issue/request.    If we are prescribing your medications:    For opioid medication refills, call the clinic or send a [employer] message 7 days in advance.  Please include:    Name of requested medication    Name of the pharmacy.    For non-opioid medications, call your pharmacy directly to request a refill. Please allow 3-4 days to be processed.     Per [place] State Law:    All controlled substance prescriptions must be filled within 30 days of being written.      For those controlled substances allowing refills, pickup must occur within 30 days of last fill.      We believe regular attendance is key to your success in our program!      Any time you are unable to keep your appointment we ask that you call us at least 24 hours in advance to cancel.This will allow us to offer the appointment time to another patient.     Multiple missed appointments may lead to dismissal from the clinic.

## 2022-06-22 NOTE — PROGRESS NOTES
06/22/22 1145   PEG: A Thee-Item Scale Assessing Pain Intensity and Interference        0 = No pain / No interference    10 = Pain as bad as you can imagine / Completely interferes   What number best describes your pain on average in the past week? 7   What number best describes how, during the past week, pain has interfered with your enjoyment of life? 8   What number best describes how, during the past week, pain has interfered with your general activity? 8   PEG Total Score 7.67

## 2022-06-22 NOTE — NURSING NOTE
PEG Score 2/17/2022 4/6/2022 6/22/2022   PEG Total Score 8 6.67 7.67     [facility] Number:  [phone]    Call with any questions about your care and for scheduling assistance.     Calls are returned Monday through Friday between 8 AM and 4:30 PM. We usually get back to you within 2 business days depending on the issue/request.    If we are prescribing your medications:    For opioid medication refills, call the clinic or send a [employer] message 7 days in advance.  Please include:    Name of requested medication    Name of the pharmacy.    For non-opioid medications, call your pharmacy directly to request a refill. Please allow 3-4 days to be processed.     Per [place] State Law:    All controlled substance prescriptions must be filled within 30 days of being written.      For those controlled substances allowing refills, pickup must occur within 30 days of last fill.      We believe regular attendance is key to your success in our program!      Any time you are unable to keep your appointment we ask that you call us at least 24 hours in advance to cancel.This will allow us to offer the appointment time to another patient.     Multiple missed appointments may lead to dismissal from the clinic.

## 2022-06-24 ENCOUNTER — OFFICE VISIT (OUTPATIENT)
Dept: INTERNAL MEDICINE | Facility: CLINIC | Age: 67
End: 2022-06-24
Payer: MEDICARE

## 2022-06-24 VITALS
WEIGHT: 247 LBS | BODY MASS INDEX: 39.87 KG/M2 | DIASTOLIC BLOOD PRESSURE: 80 MMHG | SYSTOLIC BLOOD PRESSURE: 128 MMHG | OXYGEN SATURATION: 94 % | HEART RATE: 104 BPM

## 2022-06-24 DIAGNOSIS — R21 RASH: Primary | ICD-10-CM

## 2022-06-24 DIAGNOSIS — E11.9 TYPE 2 DIABETES MELLITUS WITHOUT COMPLICATION, WITHOUT LONG-TERM CURRENT USE OF INSULIN (H): ICD-10-CM

## 2022-06-24 DIAGNOSIS — N39.3 FEMALE STRESS INCONTINENCE: ICD-10-CM

## 2022-06-24 DIAGNOSIS — J44.9 CHRONIC OBSTRUCTIVE PULMONARY DISEASE, UNSPECIFIED COPD TYPE (H): ICD-10-CM

## 2022-06-24 DIAGNOSIS — R05.9 COUGH: ICD-10-CM

## 2022-06-24 DIAGNOSIS — J42 CHRONIC BRONCHITIS, UNSPECIFIED CHRONIC BRONCHITIS TYPE (H): ICD-10-CM

## 2022-06-24 DIAGNOSIS — K21.00 GASTROESOPHAGEAL REFLUX DISEASE WITH ESOPHAGITIS WITHOUT HEMORRHAGE: ICD-10-CM

## 2022-06-24 PROCEDURE — 99214 OFFICE O/P EST MOD 30 MIN: CPT | Performed by: INTERNAL MEDICINE

## 2022-06-24 PROCEDURE — 96127 BRIEF EMOTIONAL/BEHAV ASSMT: CPT | Performed by: INTERNAL MEDICINE

## 2022-06-24 RX ORDER — PREDNISONE 20 MG/1
20 TABLET ORAL DAILY
COMMUNITY
End: 2022-06-24

## 2022-06-24 RX ORDER — PREDNISONE 20 MG/1
TABLET ORAL
Qty: 14 TABLET | Refills: 0 | Status: SHIPPED | OUTPATIENT
Start: 2022-06-24 | End: 2022-08-31

## 2022-06-24 RX ORDER — ALBUTEROL SULFATE 0.83 MG/ML
2.5 SOLUTION RESPIRATORY (INHALATION) EVERY 4 HOURS PRN
Qty: 360 ML | Refills: 5 | Status: SHIPPED | OUTPATIENT
Start: 2022-06-24 | End: 2023-11-01

## 2022-06-24 RX ORDER — CETIRIZINE HYDROCHLORIDE 10 MG/1
10 TABLET ORAL DAILY
Qty: 30 TABLET | Refills: 1 | Status: SHIPPED | OUTPATIENT
Start: 2022-06-24 | End: 2022-08-31

## 2022-06-24 ASSESSMENT — ANXIETY QUESTIONNAIRES
7. FEELING AFRAID AS IF SOMETHING AWFUL MIGHT HAPPEN: SEVERAL DAYS
3. WORRYING TOO MUCH ABOUT DIFFERENT THINGS: MORE THAN HALF THE DAYS
GAD7 TOTAL SCORE: 16
8. IF YOU CHECKED OFF ANY PROBLEMS, HOW DIFFICULT HAVE THESE MADE IT FOR YOU TO DO YOUR WORK, TAKE CARE OF THINGS AT HOME, OR GET ALONG WITH OTHER PEOPLE?: VERY DIFFICULT
1. FEELING NERVOUS, ANXIOUS, OR ON EDGE: NEARLY EVERY DAY
GAD7 TOTAL SCORE: 16
5. BEING SO RESTLESS THAT IT IS HARD TO SIT STILL: MORE THAN HALF THE DAYS
GAD7 TOTAL SCORE: 16
2. NOT BEING ABLE TO STOP OR CONTROL WORRYING: MORE THAN HALF THE DAYS
7. FEELING AFRAID AS IF SOMETHING AWFUL MIGHT HAPPEN: SEVERAL DAYS
4. TROUBLE RELAXING: NEARLY EVERY DAY
6. BECOMING EASILY ANNOYED OR IRRITABLE: NEARLY EVERY DAY

## 2022-06-24 ASSESSMENT — PATIENT HEALTH QUESTIONNAIRE - PHQ9
10. IF YOU CHECKED OFF ANY PROBLEMS, HOW DIFFICULT HAVE THESE PROBLEMS MADE IT FOR YOU TO DO YOUR WORK, TAKE CARE OF THINGS AT HOME, OR GET ALONG WITH OTHER PEOPLE: VERY DIFFICULT
SUM OF ALL RESPONSES TO PHQ QUESTIONS 1-9: 14
SUM OF ALL RESPONSES TO PHQ QUESTIONS 1-9: 14

## 2022-06-24 NOTE — PROGRESS NOTES
[facility] Follow Up Note    Assessment/Plan:  1. Rash  2 to 3-week history of a papular rash/pruritic/lower abdomen-no other areas.  No new exposures.  Recent antibiotic use?  She was worried for shingles but this is not shingles.    Recommendations: Of note, she has diffuse wheezing throughout her lungs.  She will need steroids.  We will do a taper dose of prednisone which should help the rash.  We will also add in Zyrtec.  If no improvement after 3 to 4 weeks-she will need to see dermatology    - predniSONE (DELTASONE) 20 MG tablet; Take 2 tabs daily for 4 days One tab daily for 4 days Half tab daily for 4 days  Dispense: 14 tablet; Refill: 0  - cetirizine (ZYRTEC) 10 MG tablet; Take 1 tablet (10 mg) by mouth daily  Dispense: 30 tablet; Refill: 1    2. Type 2 diabetes mellitus without complication, without long-term current use of insulin (H)  Last A1c was stable    3. Cough  Continues with persistent cough/COPD exacerbation.  With negative chest x-ray recently.  Will renew inhalers/albuterol nebs and prescribed prednisone with reevaluation.  Would like to see her get a COVID booster but will hold for now given rash and bronchospasm.    - predniSONE (DELTASONE) 20 MG tablet; Take 2 tabs daily for 4 days One tab daily for 4 days Half tab daily for 4 days  Dispense: 14 tablet; Refill: 0    4. Female stress incontinence  Concerns for female incontinence-we will provide physical therapy referral  - Physical Therapy Referral; Future    5.  Wrist pain/  MRI results reviewed.  This was ordered by neurosurgery.  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  It is unclear as to whether the numbness is associated with her neck issue versus wrist.  New line recommendations: Follow-up with hand surgery for local injections.  Prednisone should help as well.  Continue with physical therapy as recommended by neurosurgery.    Follow-up in 6 weeks/of note, she perseverates on her current insurance issues.   Unfortunately, I am not able to help with drug prices/etc.  Her most expensive medications are prescribed by other providers i.e. psychiatry and pulmonary.  She should talk to her pulmonologist about a cheaper inhaler.    Loida Stockton MD, MD    Chief Complaint:  Chief Complaint   Patient presents with     Rash     Belly, under breast - 1-2 wks since stopping Prednisone      Referral     Pelvic Floor PT - Urinary Incontinence       History of Present Illness:  Randi is a 66 year old female who is here today for discussion of a multitude of problems.  Of note, she arrives about 10 to 15 minutes late.  She is very distressed regarding her medical financial situation and insurance issues.    She has a 2 to 3-week history of the rash on her abdominal wall.  She states it looks like chickenpox and is very itchy.  It definitely crosses the midline and is just located on the abdomen.  She cannot identify any precipitating factors such as new lotions, creams, viral infection/etc.  Of note, she had been on antibiotics in early June for sinusitis and COPD exacerbation.    Additionally, she requests pelvic floor PT as she is dealing with stress incontinence.  She states she is tired of not getting anywhere with this.    She needs renewal of a multitude of medications.    She continues to gain weight as she is not active.  She seems overwhelmed with her medical issues.    She continues to complain of chest tightness.    Review of Systems:  A comprehensive review of systems was performed and was otherwise negative    PFSH:  Social History:   History   Smoking Status     Former Smoker     Packs/day: 2.50     Years: 20.00     Types: Cigarettes     Start date: 5/1/1971     Quit date: 6/29/2000   Smokeless Tobacco     Never Used       Past History:   Current Outpatient Medications   Medication Sig Dispense Refill     acetaminophen (TYLENOL) 325 MG tablet Take 2 tablets (650 mg) by mouth every 4 hours as needed for other (For  optimal non-opioid multimodal pain management to improve pain control.)       albuterol (PROVENTIL) (2.5 MG/3ML) 0.083% neb solution Take 1 vial (2.5 mg) by nebulization every 4 hours as needed for shortness of breath / dyspnea or wheezing 360 mL 5     albuterol (VENTOLIN HFA) 108 (90 Base) MCG/ACT inhaler Inhale 2 puffs into the lungs every 6 hours 18 g 11     benzonatate (TESSALON) 200 MG capsule Take 1 capsule (200 mg) by mouth 3 times daily as needed for cough 20 capsule 1     cetirizine (ZYRTEC) 10 MG tablet Take 1 tablet (10 mg) by mouth daily 30 tablet 1     Cholecalciferol (VITAMIN D3) 250 MCG (13939 UT) TABS Take 1 tablet by mouth daily 65546/day       Cyanocobalamin (VITAMIN B-12) 5000 MCG SUBL Place 2-3 sprays under the tongue daily Unknown dose. 2 or 3 sprays/day       ethacrynic acid (EDECRIN) 25 MG tablet Take 1 tablet by mouth on Saturday and Wednesday 30 tablet 11     Fluticasone-Umeclidin-Vilanterol (TRELEGY ELLIPTA) 200-62.5-25 MCG/INH oral inhaler Inhale 1 puff into the lungs daily 1 each 11     guaiFENesin (MUCINEX) 600 MG 12 hr tablet Take 1 tablet (600 mg) by mouth 2 times daily 60 tablet 2     HYDROcodone-acetaminophen (NORCO) 5-325 MG tablet Take 1 tablet by mouth 3 times daily as needed for breakthrough pain or moderate to severe pain 70 tablet 0     hydrOXYzine (ATARAX) 25 MG tablet Take 1 tablet by mouth daily as needed       LITHIUM PO Take 25 mg by mouth daily OTC lithium oratate       losartan (COZAAR) 25 MG tablet Take 1 tablet (25 mg) by mouth daily 90 tablet 2     magnesium 250 MG tablet Take 1 tablet by mouth 2 times daily       medical cannabis (Patient's own supply) See Admin Instructions (The purpose of this order is to document that the patient reports taking medical cannabis.  This is not a prescription, and is not used to certify that the patient has a qualifying medical condition.)       methocarbamol (ROBAXIN) 500 MG tablet Take 1 tablet (500 mg) by mouth 4 times daily Wean  down on your frequency. - May fill/start 3/31/22 112 tablet 3     omeprazole (PRILOSEC) 20 MG DR capsule Take 1 capsule (20 mg) by mouth daily 90 capsule 3     OXcarbazepine (TRILEPTAL) 150 MG tablet Take one tablet daily at bedtime 30 tablet 3     potassium chloride ER (KLOR-CON M) 20 MEQ CR tablet Take 1 tablet (20 mEq) by mouth daily 90 tablet 3     predniSONE (DELTASONE) 20 MG tablet Take 2 tabs daily for 4 days One tab daily for 4 days Half tab daily for 4 days 14 tablet 0     rOPINIRole (REQUIP) 2 MG tablet Take 1-2 tablets (2-4 mg) by mouth At Bedtime 120 tablet 3     SYNTHROID 150 MCG tablet Take 1 tablet (150 mcg) by mouth daily 90 tablet 1     vitamin E 400 units TABS Take 800 Units by mouth daily         Family History:     Physical Exam:  General Appearance:   She is perseverating about insurance issues.  Her weight is up overall  Vitals:    06/24/22 0712   BP: 128/80   BP Location: Left arm   Patient Position: Sitting   Cuff Size: Adult Large   Pulse: 104   SpO2: 94%   Weight: 112 kg (247 lb)     Wt Readings from Last 3 Encounters:   06/24/22 112 kg (247 lb)   06/22/22 113.9 kg (251 lb)   06/07/22 109.6 kg (241 lb 9.6 oz)     Body mass index is 39.87 kg/m .    Lungs reveal diffuse wheezing throughout and it is especially intensified with forced vital capacity maneuver  Cardiac exam reveals regular rate and rhythm  Abdomen is examined.  There are small papular nonvesicular lesions noted spread out on her abdomen.  It definitely crosses the midline    Answers for HPI/ROS submitted by the patient on 6/24/2022  If you checked off any problems, how difficult have these problems made it for you to do your work, take care of things at home, or get along with other people?: Very difficult  PHQ9 TOTAL SCORE: 14  CHAVO 7 TOTAL SCORE: 16  Depression/Anxiety: Depression & Anxiety  Status since last visit:: no change  Anxiety since last: : no change  Other associated symptoms of depression:: No  Other associated  symotome: : No  Significant life event: : health concerns  Anxious:: Yes  Current substance use:: No  How many servings of fruits and vegetables do you eat daily?: 2-3  On average, how many sweetened beverages do you drink each day (Examples: soda, juice, sweet tea, etc.  Do NOT count diet or artificially sweetened beverages)?: 0  How many minutes a day do you exercise enough to make your heart beat faster?: 10 to 19  How many days a week do you exercise enough to make your heart beat faster?: 4  How many days per week do you miss taking your medication?: 0  What is the reason for your visit today?: MRI, rash  When did your symptoms begin?: 3-4 weeks ago  What are your symptoms?: Painful wrist, rash  How would you describe these symptoms?: Moderate  Are your symptoms:: Worsening  Have you had these symptoms before?: No  Is there anything that makes you feel worse?: Use  Is there anything that makes you feel better?: Massage, heat

## 2022-06-28 ENCOUNTER — VIRTUAL VISIT (OUTPATIENT)
Dept: PSYCHOLOGY | Facility: CLINIC | Age: 67
End: 2022-06-28
Payer: MEDICARE

## 2022-06-28 DIAGNOSIS — F41.1 GENERALIZED ANXIETY DISORDER: ICD-10-CM

## 2022-06-28 DIAGNOSIS — F31.81 BIPOLAR 2 DISORDER (H): Primary | ICD-10-CM

## 2022-06-28 DIAGNOSIS — F43.9 TRAUMA AND STRESSOR-RELATED DISORDER: ICD-10-CM

## 2022-06-28 PROCEDURE — 90837 PSYTX W PT 60 MINUTES: CPT | Mod: 95 | Performed by: SOCIAL WORKER

## 2022-06-28 ASSESSMENT — PATIENT HEALTH QUESTIONNAIRE - PHQ9
SUM OF ALL RESPONSES TO PHQ QUESTIONS 1-9: 14
SUM OF ALL RESPONSES TO PHQ QUESTIONS 1-9: 14
10. IF YOU CHECKED OFF ANY PROBLEMS, HOW DIFFICULT HAVE THESE PROBLEMS MADE IT FOR YOU TO DO YOUR WORK, TAKE CARE OF THINGS AT HOME, OR GET ALONG WITH OTHER PEOPLE: VERY DIFFICULT

## 2022-06-28 NOTE — PROGRESS NOTES
[facility]                                     Progress Note    Patient Name: Randi Cleary  Date: 6/28/22         Service Type: Individual     Session Start Time: 10:30 AM  Session End Time: 11:27 AM     Session Length: 57 minutes    Session #: 116    Attendees: Client attended alone    Service Modality:  Video Visit:      Provider verified identity through the following two step process.  Patient provided:  Patient is known previously to provider    Telemedicine Visit: The patient's condition can be safely assessed and treated via synchronous audio and visual telemedicine encounter.      Reason for Telemedicine Visit: Services only offered telehealth    Originating Site (Patient Location): Patient's home    Distant Site (Provider Location): Provider Remote Setting- Home Office    Consent:  The patient/guardian has verbally consented to: the potential risks and benefits of telemedicine (video visit) versus in person care; bill my insurance or make self-payment for services provided; and responsibility for payment of non-covered services.     Patient would like the video invitation sent by:  My Chart    Mode of Communication:  Video Conference via [employer]well    As the provider I attest to compliance with applicable laws and regulations related to telemedicine.    DATA  Extended Session (53+ minutes): PROLONGED SERVICE IN THE OUTPATIENT SETTING REQUIRING DIRECT (FACE-TO-FACE) PATIENT CONTACT BEYOND THE USUAL SERVICE:    - High distress and under complex circumstances.  See Data section for details  Interactive Complexity: No  Crisis: No        Progress Since Last Session (Related to Symptoms / Goals / Homework):   Symptoms: Frustrated lately    Homework: Progress made  Spend time with outside on the porch. -done  Journal -done  Get in the pool -not done  Get moving again  -not done  Look into a couples therapist -not done     Episode of Care Goals: Satisfactory progress - ACTION (Actively  working towards change); Intervened by reinforcing change plan / affirming steps taken     Current / Ongoing Stressors and Concerns:   Patient is currently socially isolated. She has a conflictual relationship with her .  She is getting minimal physical activity.  She had recent eye surgery and shoulder surgery.     Treatment Objective(s) Addressed in This Session:   Patient will increase frequency of engaging her in ADLs.  Patient will track and record at least 5 pleasant exchanges with . Patient will be able to identify at least 5 positive traits about her .  Patient will reduce level of depressive and anxious features as evidenced by reduction in score on her CHAVO-7 and PHQ-9 (scores of 15 and 16 at first measurment, respectively).     Intervention:   Supportive Therapy: Processed challenges with health and bills.  Processed frustrations and identified how to care for herself while in the midst of frustrations. Reviewed homework and identified successes and barriers to completion.  Set new goals for the week.     The following assessments were completed by patient for this visit:  PHQ9: of note, patient reported she was in a hurry and didn't read all questions, she reports no suicidal ideation  PHQ-9 SCORE 5/12/2022 5/16/2022 6/9/2022 6/14/2022 6/21/2022 6/24/2022 6/28/2022   PHQ-9 Total Score MyChart 15 (Moderately severe depression) 19 (Moderately severe depression) 18 (Moderately severe depression) 12 (Moderate depression) 16 (Moderately severe depression) 14 (Moderate depression) 14 (Moderate depression)   PHQ-9 Total Score 15 19 18 12 16 14 14   Some encounter information is confidential and restricted. Go to Review Flowsheets activity to see all data.     GAD7:   CHAVO-7 SCORE 5/2/2022 5/2/2022 5/10/2022 5/10/2022 5/10/2022 6/9/2022 6/24/2022   Total Score - 16 (severe anxiety) - - 18 (severe anxiety) 14 (moderate anxiety) 16 (severe anxiety)   Total Score 16 16 18 18 18 14 16   Some  encounter information is confidential and restricted. Go to Review Flowsheets activity to see all data.     PROMIS 10-Global Health (only subscores and total score):   PROMIS-10 Scores Only 12/14/2021 3/15/2022 3/15/2022 3/15/2022 3/24/2022 4/1/2022 6/9/2022   Global Mental Health Score 6 6 6 6 8 12 12   Global Physical Health Score 9 9 9 9 8 11 10   PROMIS TOTAL - SUBSCORES 15 15 15 15 16 23 22        ASSESSMENT: Current Emotional / Mental Status (status of significant symptoms):   Risk status (Self / Other harm or suicidal ideation)   Patient denies current fears or concerns for personal safety.   Patient denies current or recent suicidal ideation or behaviors.   Patient denies current or recent homicidal ideation or behaviors.   Patient denies current or recent self injurious behavior or ideation.   Patient denies other safety concerns.   Patient reports there has been no change in risk factors since their last session.     Patient reports there has been no change in protective factors since their last session.     A safety and risk management plan has been developed including: Patient has no change in safety concerns. Committed to safety and agreed to follow previously developed safety plan..  Patient consented to co-developed safety plan.  Safety and risk management plan was completed.  Patient agreed to use safety plan should any safety concerns arise.  A copy was given to the patient.     Appearance:   Appropriate    Eye Contact:   Good    Psychomotor Behavior: Normal    Attitude:   Cooperative    Orientation:   All   Speech    Rate / Production: Normal/ Responsive    Volume:  Normal    Mood:    Normal   Affect:    Appropriate    Thought Content:  Clear    Thought Form:  Coherent    Insight:    Good      Medication Review:   No changes to current psychiatric medication(s)     Medication Compliance:   Yes     Changes in Health Issues:   None reported     Chemical Use Review:   Substance Use: Chemical use  reviewed, no active concerns identified Nothing used since 2021.     Tobacco Use: No current tobacco use.      Diagnosis:  1. Bipolar 2 disorder (H)    2. Generalized anxiety disorder    3. Trauma and stressor-related disorder      Collateral Reports Completed:   Not Applicable    PLAN: (Patient Tasks / Therapist Tasks / Other)  Go to pool  Paint the porch  Go through paperwork for taxes  Look into a couples therapist     In the future:  Get back to budgeting      There has been demonstrated improvement in functioning while patient has been engaged in psychotherapy/psychological service- if withdrawn the patient would deteriorate and/or relapse.     MICAELA SLADE   2022                                                        ______________________________________________________________________    Individual Treatment Plan    Patient's Name: Randi Cleary  YOB: 1955    Date of Creation: 20  Date Treatment Plan Last Reviewed/Revised: 22    DSM5 Diagnoses: 296.89 Bipolar II Disorder Depressed, 300.02 (F41.1) Generalized Anxiety Disorder or Adjustment Disorders  309.89 (F43.8) Other Specified Trauma and Stressor Related Disorder  Psychosocial / Contextual Factors: Patient's entire family of origin has , she now has a sister-in-law and  as support.  Relationship with  is conflictual. She is recovering from surgeries  PROMIS (reviewed every 90 days): PROMIS-10 Scores  PROMIS 10-Global Health (only subscores and total score):   PROMIS-10 Scores Only 2021 3/15/2022 3/15/2022 3/15/2022 3/24/2022 2022 2022   Global Mental Health Score 6 6 6 6 8 12 12   Global Physical Health Score 9 9 9 9 8 11 10   PROMIS TOTAL - SUBSCORES 15 15 15 15 16 23 22       Referral / Collaboration:  Referral to another professional/service is not indicated at this time..    Anticipated number of session for this episode of care: 50  Anticipation frequency of session:  "Biweekly  Anticipated Duration of each session: 53 or more minutes due to intensity of trauma symptoms  Treatment plan will be reviewed in 90 days or when goals have been changed.   There has been demonstrated improvement in functioning while patient has been engaged in psychotherapy/psychological service- if withdrawn the patient would deteriorate and/or relapse.       MeasurableTreatment Goal(s) related to diagnosis / functional impairment(s)  Goal 1: Patient will \"jumpstart, getting going with the things I need to be doing around the house as far as picking up, doing things, trying to do something every day.  Also to lessen the animosity between me and my .\"    I will know I've met my goal when my shoulders are fixed and I can see.      Objective #A (Patient Action)    Patient will increase frequency of engaging her in ADLs.  Status: Continued - Date(s): 12/10/21, 3/9/22, 6/9/22    Intervention(s)  Therapist will engage patient in CBT, specifically behavioral activation.    Objective #B  Patient will  track and record at least 5 pleasant exchanges with . Patient will be able to identify at least 5 positive traits about her  and how he relates to her.  Status: Continued - Date(s): 12/10/21, 3/9/22, 6/9/22    Intervention(s)  Therapist will teach assertiveness skills and assign homework related to relationship interactions.    Objective #C  Patient will reduce level of depressive and anxious features as evidenced by reduction in score on her CHAVO-7 and PHQ-9 (scores of 15 and 16 at first measurment, respectively).  Status: Continued - Date(s): 12/10/21, 3/9/22, 6/9/22    Intervention(s)  Therapist will engage patient in person-centered therapy and CBT.    Patient has reviewed and agreed to the above plan.      MICAELA SLADE  June 9, 2022                                                   Randi Cleary          SAFETY PLAN:  Step 1: Warning signs / cues (Thoughts, images, mood, situation, " "behavior) that a crisis may be developing:  ? Thoughts: \"I don't want to continue\" \"I am unwanted\"  ? Images: none  ? Thinking Processes: ruminating  ? Mood: anger  ? Behaviors: isolating/withdrawing , can't stop crying, not taking care of myself and not taking care of my responsibilities  ? Situations: small triggers, such as not being able to find something, or dropping something   Step 2: Coping strategies - Things I can do to take my mind off of my problems without contacting another person (relaxation technique, physical activity):  ? Distress Tolerance Strategies:  arts and crafts: drawing, play with my pet , listen to positive and upbeat music: any, change body temperature (ice pack/cold water)  and paced breathing/progressive muscle relaxation  ? Physical Activities: go for a walk, deep breathing and stretching   ? Focus on helpful thoughts:  \"You've been through this before, you can get through it again.\"  Step 3: People and social settings that provide distraction:                 Name: Carmen                            Name: Darien                           Name: Aleida       ? pool, shopping, Carmen's house, [employer]       Step 4: Remind myself of people and things that are important to me and worth living for:  Clifford Little Donna, post-COVID world, options of what could be in your future        Step 5: When I am in crisis, I can ask these people to help me use my safety plan:                 Name: Sidney  Step 6: Making the environment safe:   ? go to sleep/daydream  Step 7: Professionals or agencies I can contact during a crisis:  ? [facility] Number: [phone]  ? Suicide Prevention Lifeline: 6-355-458-PBMB (8111)  ? Crisis Text Line Service (available 24 hours a day, 7 days a week): Text [place] to 325156    Local Crisis Services: [facility] Crisis: [phone]     Call 911 or go to my nearest emergency department.       I helped develop this safety plan and agree to use it " when needed.  I have been given a copy of this plan.       Client signature _________________________________________________________________  Today s date:  11/24/2020  Adapted from Safety Plan Template 2008 Randi Poole and Robby Barba is reprinted with the express permission of the authors.  No portion of the Safety Plan Template may be reproduced without the express, written permission.  You can contact the authors at [email or web address] or [email or web address].

## 2022-06-30 ENCOUNTER — VIRTUAL VISIT (OUTPATIENT)
Dept: PSYCHOLOGY | Facility: CLINIC | Age: 67
End: 2022-06-30
Payer: MEDICARE

## 2022-06-30 DIAGNOSIS — F43.9 TRAUMA AND STRESSOR-RELATED DISORDER: ICD-10-CM

## 2022-06-30 DIAGNOSIS — F31.81 BIPOLAR 2 DISORDER (H): Primary | ICD-10-CM

## 2022-06-30 DIAGNOSIS — F41.1 GENERALIZED ANXIETY DISORDER: ICD-10-CM

## 2022-06-30 PROCEDURE — 90837 PSYTX W PT 60 MINUTES: CPT | Mod: 95 | Performed by: SOCIAL WORKER

## 2022-06-30 ASSESSMENT — PATIENT HEALTH QUESTIONNAIRE - PHQ9
10. IF YOU CHECKED OFF ANY PROBLEMS, HOW DIFFICULT HAVE THESE PROBLEMS MADE IT FOR YOU TO DO YOUR WORK, TAKE CARE OF THINGS AT HOME, OR GET ALONG WITH OTHER PEOPLE: EXTREMELY DIFFICULT
SUM OF ALL RESPONSES TO PHQ QUESTIONS 1-9: 17
SUM OF ALL RESPONSES TO PHQ QUESTIONS 1-9: 17

## 2022-06-30 NOTE — PROGRESS NOTES
[facility]                                     Progress Note    Patient Name: Randi Cleary  Date: 6/30/22         Service Type: Individual     Session Start Time: 10:35 AM  Session End Time: 11:28 AM     Session Length: 53 minutes    Session #: 117    Attendees: Client attended alone    Service Modality:  Video Visit:      Provider verified identity through the following two step process.  Patient provided:  Patient is known previously to provider    Telemedicine Visit: The patient's condition can be safely assessed and treated via synchronous audio and visual telemedicine encounter.      Reason for Telemedicine Visit: Services only offered telehealth    Originating Site (Patient Location): Patient's home    Distant Site (Provider Location): Provider Remote Setting- Home Office    Consent:  The patient/guardian has verbally consented to: the potential risks and benefits of telemedicine (video visit) versus in person care; bill my insurance or make self-payment for services provided; and responsibility for payment of non-covered services.     Patient would like the video invitation sent by:  My Chart    Mode of Communication:  Video Conference via [employer]well    As the provider I attest to compliance with applicable laws and regulations related to telemedicine.    DATA  Extended Session (53+ minutes): PROLONGED SERVICE IN THE OUTPATIENT SETTING REQUIRING DIRECT (FACE-TO-FACE) PATIENT CONTACT BEYOND THE USUAL SERVICE:    - High distress and under complex circumstances.  See Data section for details  Interactive Complexity: No  Crisis: No        Progress Since Last Session (Related to Symptoms / Goals / Homework):   Symptoms: Ongoing frustration    Homework: Progress made  Go to pool -done  Paint the porch  Go through paperwork for taxes  Look into a couples therapist     Episode of Care Goals: Satisfactory progress - ACTION (Actively working towards change); Intervened by reinforcing change plan  / affirming steps taken     Current / Ongoing Stressors and Concerns:   Patient is currently socially isolated. She has a conflictual relationship with her .  She is getting minimal physical activity.  She had recent eye surgery and shoulder surgery.     Treatment Objective(s) Addressed in This Session:   Patient will increase frequency of engaging her in ADLs.  Patient will track and record at least 5 pleasant exchanges with . Patient will be able to identify at least 5 positive traits about her .  Patient will reduce level of depressive and anxious features as evidenced by reduction in score on her CHAVO-7 and PHQ-9 (scores of 15 and 16 at first measurment, respectively).     Intervention:   Supportive Therapy: Processed recent challenges with health and how to prioritize caring for herself. Reviewed homework and identified successes and barriers to completion. Set updated goals.    The following assessments were completed by patient for this visit:  PHQ9: of note, patient reported she was in a hurry and didn't read all questions, she reports no suicidal ideation  PHQ-9 SCORE 5/16/2022 6/9/2022 6/14/2022 6/21/2022 6/24/2022 6/28/2022 6/30/2022   PHQ-9 Total Score MyChart 19 (Moderately severe depression) 18 (Moderately severe depression) 12 (Moderate depression) 16 (Moderately severe depression) 14 (Moderate depression) 14 (Moderate depression) 17 (Moderately severe depression)   PHQ-9 Total Score 19 18 12 16 14 14 17   Some encounter information is confidential and restricted. Go to Review Flows[facility]Meet[facility] activity to see all data.     GAD7:   CHAVO-7 SCORE 5/2/2022 5/2/2022 5/10/2022 5/10/2022 5/10/2022 6/9/2022 6/24/2022   Total Score - 16 (severe anxiety) - - 18 (severe anxiety) 14 (moderate anxiety) 16 (severe anxiety)   Total Score 16 16 18 18 18 14 16   Some encounter information is confidential and restricted. Go to Review Flowsheets activity to see all data.     PROMIS 10-Global Health (only  subscores and total score):   PROMIS-10 Scores Only 12/14/2021 3/15/2022 3/15/2022 3/15/2022 3/24/2022 4/1/2022 6/9/2022   Global Mental Health Score 6 6 6 6 8 12 12   Global Physical Health Score 9 9 9 9 8 11 10   PROMIS TOTAL - SUBSCORES 15 15 15 15 16 23 22        ASSESSMENT: Current Emotional / Mental Status (status of significant symptoms):   Risk status (Self / Other harm or suicidal ideation)   Patient denies current fears or concerns for personal safety.   Patient denies current or recent suicidal ideation or behaviors.   Patient denies current or recent homicidal ideation or behaviors.   Patient denies current or recent self injurious behavior or ideation.   Patient denies other safety concerns.   Patient reports there has been no change in risk factors since their last session.     Patient reports there has been no change in protective factors since their last session.     A safety and risk management plan has been developed including: Patient has no change in safety concerns. Committed to safety and agreed to follow previously developed safety plan..  Patient consented to co-developed safety plan.  Safety and risk management plan was completed.  Patient agreed to use safety plan should any safety concerns arise.  A copy was given to the patient.     Appearance:   Appropriate    Eye Contact:   Good    Psychomotor Behavior: Normal    Attitude:   Cooperative    Orientation:   All   Speech    Rate / Production: Normal/ Responsive    Volume:  Normal    Mood:    Normal   Affect:    Appropriate    Thought Content:  Clear    Thought Form:  Coherent    Insight:    Good      Medication Review:   No changes to current psychiatric medication(s)     Medication Compliance:   Yes     Changes in Health Issues:   None reported     Chemical Use Review:   Substance Use: Chemical use reviewed, no active concerns identified Nothing used since August 2021.     Tobacco Use: No current tobacco use.      Diagnosis:  1. Bipolar 2  disorder (H)    2. Generalized anxiety disorder    3. Trauma and stressor-related disorder      Collateral Reports Completed:   Not Applicable    PLAN: (Patient Tasks / Therapist Tasks / Other)  Go to pool  Paint the porch  Go through paperwork for taxes  Look into a couples therapist     In the future:  Get back to budgeting      There has been demonstrated improvement in functioning while patient has been engaged in psychotherapy/psychological service- if withdrawn the patient would deteriorate and/or relapse.     MICAELA SLADE   2022                                                        ______________________________________________________________________    Individual Treatment Plan    Patient's Name: Randi Cleary  YOB: 1955    Date of Creation: 20  Date Treatment Plan Last Reviewed/Revised: 22    DSM5 Diagnoses: 296.89 Bipolar II Disorder Depressed, 300.02 (F41.1) Generalized Anxiety Disorder or Adjustment Disorders  309.89 (F43.8) Other Specified Trauma and Stressor Related Disorder  Psychosocial / Contextual Factors: Patient's entire family of origin has , she now has a sister-in-law and  as support.  Relationship with  is conflictual. She is recovering from surgeries  PROMIS (reviewed every 90 days): PROMIS-10 Scores  PROMIS 10-Global Health (only subscores and total score):   PROMIS-10 Scores Only 2021 3/15/2022 3/15/2022 3/15/2022 3/24/2022 2022 2022   Global Mental Health Score 6 6 6 6 8 12 12   Global Physical Health Score 9 9 9 9 8 11 10   PROMIS TOTAL - SUBSCORES 15 15 15 15 16 23 22       Referral / Collaboration:  Referral to another professional/service is not indicated at this time..    Anticipated number of session for this episode of care: 50  Anticipation frequency of session: Biweekly  Anticipated Duration of each session: 53 or more minutes due to intensity of trauma symptoms  Treatment plan will be reviewed in 90  "days or when goals have been changed.   There has been demonstrated improvement in functioning while patient has been engaged in psychotherapy/psychological service- if withdrawn the patient would deteriorate and/or relapse.       MeasurableTreatment Goal(s) related to diagnosis / functional impairment(s)  Goal 1: Patient will \"jumpstart, getting going with the things I need to be doing around the house as far as picking up, doing things, trying to do something every day.  Also to lessen the animosity between me and my .\"    I will know I've met my goal when my shoulders are fixed and I can see.      Objective #A (Patient Action)    Patient will increase frequency of engaging her in ADLs.  Status: Continued - Date(s): 12/10/21, 3/9/22, 6/9/22    Intervention(s)  Therapist will engage patient in CBT, specifically behavioral activation.    Objective #B  Patient will  track and record at least 5 pleasant exchanges with . Patient will be able to identify at least 5 positive traits about her  and how he relates to her.  Status: Continued - Date(s): 12/10/21, 3/9/22, 6/9/22    Intervention(s)  Therapist will teach assertiveness skills and assign homework related to relationship interactions.    Objective #C  Patient will reduce level of depressive and anxious features as evidenced by reduction in score on her CHAVO-7 and PHQ-9 (scores of 15 and 16 at first measurment, respectively).  Status: Continued - Date(s): 12/10/21, 3/9/22, 6/9/22    Intervention(s)  Therapist will engage patient in person-centered therapy and CBT.    Patient has reviewed and agreed to the above plan.      MICAELA SLADE  June 9, 2022                                                   Randi Cleary          SAFETY PLAN:  Step 1: Warning signs / cues (Thoughts, images, mood, situation, behavior) that a crisis may be developing:  ? Thoughts: \"I don't want to continue\" \"I am unwanted\"  ? Images: none  ? Thinking " "Processes: ruminating  ? Mood: anger  ? Behaviors: isolating/withdrawing , can't stop crying, not taking care of myself and not taking care of my responsibilities  ? Situations: small triggers, such as not being able to find something, or dropping something   Step 2: Coping strategies - Things I can do to take my mind off of my problems without contacting another person (relaxation technique, physical activity):  ? Distress Tolerance Strategies:  arts and crafts: drawing, play with my pet , listen to positive and upbeat music: any, change body temperature (ice pack/cold water)  and paced breathing/progressive muscle relaxation  ? Physical Activities: go for a walk, deep breathing and stretching   ? Focus on helpful thoughts:  \"You've been through this before, you can get through it again.\"  Step 3: People and social settings that provide distraction:                 Name: Carmen                            Name: Darien                           Name: Aleida       ? pool, shopping, Carmen's house, [employer]       Step 4: Remind myself of people and things that are important to me and worth living for:  Clifford Little Donna, post-COVID world, options of what could be in your future        Step 5: When I am in crisis, I can ask these people to help me use my safety plan:                 Name: Sidney  Step 6: Making the environment safe:   ? go to sleep/daydream  Step 7: Professionals or agencies I can contact during a crisis:  ? [facility] Daytime Number: [phone]  ? Suicide Prevention Lifeline: 0-345-683-ELWK (4001)  ? Crisis Text Line Service (available 24 hours a day, 7 days a week): Text [place] to 647108    Local Crisis Services: [facility] Crisis: [phone]     Call 911 or go to my nearest emergency department.       I helped develop this safety plan and agree to use it when needed.  I have been given a copy of this plan.       Client " signature _________________________________________________________________  Today s date:  11/24/2020  Adapted from Safety Plan Template 2008 Randi Poole and Robby Barba is reprinted with the express permission of the authors.  No portion of the Safety Plan Template may be reproduced without the express, written permission.  You can contact the authors at [email or web address] or [email or web address].

## 2022-07-01 ENCOUNTER — INFUSION THERAPY VISIT (OUTPATIENT)
Dept: ONCOLOGY | Facility: CLINIC | Age: 67
End: 2022-07-01
Payer: MEDICARE

## 2022-07-01 ENCOUNTER — APPOINTMENT (OUTPATIENT)
Dept: LAB | Facility: CLINIC | Age: 67
End: 2022-07-01
Payer: MEDICARE

## 2022-07-01 VITALS
OXYGEN SATURATION: 95 % | RESPIRATION RATE: 18 BRPM | WEIGHT: 247.2 LBS | DIASTOLIC BLOOD PRESSURE: 74 MMHG | BODY MASS INDEX: 39.9 KG/M2 | HEART RATE: 104 BPM | TEMPERATURE: 98.1 F | SYSTOLIC BLOOD PRESSURE: 111 MMHG

## 2022-07-01 DIAGNOSIS — E83.119 HEMOCHROMATOSIS, UNSPECIFIED HEMOCHROMATOSIS TYPE: ICD-10-CM

## 2022-07-01 LAB
BASOPHILS # BLD AUTO: 0 10E3/UL (ref 0–0.2)
BASOPHILS NFR BLD AUTO: 0 %
EOSINOPHIL # BLD AUTO: 0 10E3/UL (ref 0–0.7)
EOSINOPHIL NFR BLD AUTO: 0 %
ERYTHROCYTE [DISTWIDTH] IN BLOOD BY AUTOMATED COUNT: 12.7 % (ref 10–15)
FERRITIN SERPL-MCNC: 60 NG/ML (ref 8–252)
HCT VFR BLD AUTO: 45.4 % (ref 35–47)
HGB BLD-MCNC: 15.8 G/DL (ref 11.7–15.7)
IMM GRANULOCYTES # BLD: 0.1 10E3/UL
IMM GRANULOCYTES NFR BLD: 1 %
IRON SATN MFR SERPL: 38 % (ref 15–46)
IRON SERPL-MCNC: 127 UG/DL (ref 35–180)
LYMPHOCYTES # BLD AUTO: 1.1 10E3/UL (ref 0.8–5.3)
LYMPHOCYTES NFR BLD AUTO: 14 %
MCH RBC QN AUTO: 33.3 PG (ref 26.5–33)
MCHC RBC AUTO-ENTMCNC: 34.8 G/DL (ref 31.5–36.5)
MCV RBC AUTO: 96 FL (ref 78–100)
MONOCYTES # BLD AUTO: 0.4 10E3/UL (ref 0–1.3)
MONOCYTES NFR BLD AUTO: 5 %
NEUTROPHILS # BLD AUTO: 6.2 10E3/UL (ref 1.6–8.3)
NEUTROPHILS NFR BLD AUTO: 80 %
NRBC # BLD AUTO: 0 10E3/UL
NRBC BLD AUTO-RTO: 0 /100
PLATELET # BLD AUTO: 263 10E3/UL (ref 150–450)
RBC # BLD AUTO: 4.74 10E6/UL (ref 3.8–5.2)
RETICS # AUTO: 0.16 10E6/UL (ref 0.03–0.1)
RETICS/RBC NFR AUTO: 3.4 % (ref 0.5–2)
TIBC SERPL-MCNC: 334 UG/DL (ref 240–430)
WBC # BLD AUTO: 7.9 10E3/UL (ref 4–11)

## 2022-07-01 PROCEDURE — 83550 IRON BINDING TEST: CPT

## 2022-07-01 PROCEDURE — 85004 AUTOMATED DIFF WBC COUNT: CPT

## 2022-07-01 PROCEDURE — 99195 PHLEBOTOMY: CPT

## 2022-07-01 PROCEDURE — 85045 AUTOMATED RETICULOCYTE COUNT: CPT

## 2022-07-01 PROCEDURE — 82728 ASSAY OF FERRITIN: CPT

## 2022-07-01 PROCEDURE — 36415 COLL VENOUS BLD VENIPUNCTURE: CPT

## 2022-07-01 ASSESSMENT — PAIN SCALES - GENERAL: PAINLEVEL: NO PAIN (0)

## 2022-07-01 NOTE — PROGRESS NOTES
Infusion Nursing Note:  Randi Cleary presents today for Therapeutic Phlebotomy.    Patient seen by provider today: No   present during visit today: Not Applicable.    Note: Winnie presents to infusion today feeling ok. She states her fatigue improves with rest. She denies any fevers, chills, dizziness, or pain. Her ongoing cough and COPD is tolerable. She denies any new questions or concerns.     250ml removed in total. 175ml removed via gravity bag, and the last 75ml easily removed via syringe. Patient denies any dizziness or light headedness following phlebotomy.    Therapy plan has 250ml NS available. Patient declined saline infusion d/t ongoing fluid retention.     Intravenous Access:  Peripheral IV placed.    Treatment Conditions:  Lab Results   Component Value Date    HGB 15.8 (H) 07/01/2022    WBC 7.9 07/01/2022    ANEU 4.7 02/05/2021    ANEUTAUTO 6.2 07/01/2022     07/01/2022       Latest Reference Range & Units 07/01/22 09:58   Ferritin 8 - 252 ng/mL 60     Results reviewed, labs MET treatment parameters (hgb >11, Ferritin >50), ok to proceed with treatment.    Post Infusion Assessment:  Patient tolerated infusion without incident.  Blood return noted pre and post infusion.  Site patent and intact, free from redness, edema or discomfort.  Access discontinued per protocol.     Discharge Plan:   Patient declined prescription refills.  Discharge instructions reviewed with: Patient.  Patient and/or family verbalized understanding of discharge instructions and all questions answered.  Copy of AVS reviewed with patient and/or family. Patient will return 7/11 for next appointment.  Patient discharged in stable condition accompanied by: self.  Departure Mode: Ambulatory.      Tila Dyer RN

## 2022-07-01 NOTE — NURSING NOTE
Chief Complaint   Patient presents with     Blood Draw     IV placement with blood draw by lab RN. Vitals taken and appointment arrived     Aparna Rainey RN

## 2022-07-01 NOTE — PATIENT INSTRUCTIONS
Latest Reference Range & Units 07/01/22 09:58   Ferritin 8 - 252 ng/mL 60   Iron 35 - 180 ug/dL 127   Iron Binding Cap 240 - 430 ug/dL 334   Iron Saturation Index 15 - 46 % 38   WBC 4.0 - 11.0 10e3/uL 7.9   Hemoglobin 11.7 - 15.7 g/dL 15.8 (H)   Hematocrit 35.0 - 47.0 % 45.4   Platelet Count 150 - 450 10e3/uL 263   RBC Count 3.80 - 5.20 10e6/uL 4.74   MCV 78 - 100 fL 96   MCH 26.5 - 33.0 pg 33.3 (H)   MCHC 31.5 - 36.5 g/dL 34.8   RDW 10.0 - 15.0 % 12.7   % Neutrophils % 80   % Lymphocytes % 14   % Monocytes % 5   % Eosinophils % 0   % Basophils % 0   Absolute Basophils 0.0 - 0.2 10e3/uL 0.0   Absolute Eosinophils 0.0 - 0.7 10e3/uL 0.0   Absolute Immature Granulocytes <=0.4 10e3/uL 0.1   Absolute Lymphocytes 0.8 - 5.3 10e3/uL 1.1   Absolute Monocytes 0.0 - 1.3 10e3/uL 0.4   % Immature Granulocytes % 1   Absolute Neutrophils 1.6 - 8.3 10e3/uL 6.2   Absolute NRBCs 10e3/uL 0.0   NRBCs per 100 WBC <1 /100 0   % Retic 0.5 - 2.0 % 3.4 (H)   Absolute Retic 0.025 - 0.095 10e6/uL 0.159 (H)   (H): Data is abnormally high

## 2022-07-07 ENCOUNTER — VIRTUAL VISIT (OUTPATIENT)
Dept: PSYCHOLOGY | Facility: CLINIC | Age: 67
End: 2022-07-07
Payer: MEDICARE

## 2022-07-07 DIAGNOSIS — F41.1 GENERALIZED ANXIETY DISORDER: ICD-10-CM

## 2022-07-07 DIAGNOSIS — F31.81 BIPOLAR 2 DISORDER (H): Primary | ICD-10-CM

## 2022-07-07 DIAGNOSIS — F43.9 TRAUMA AND STRESSOR-RELATED DISORDER: ICD-10-CM

## 2022-07-07 PROCEDURE — 90834 PSYTX W PT 45 MINUTES: CPT | Mod: 95 | Performed by: SOCIAL WORKER

## 2022-07-07 NOTE — PROGRESS NOTES
[facility]                                     Progress Note    Patient Name: Randi Cleary  Date: 7/7/22         Service Type: Individual     Session Start Time: 3:01 PM  Session End Time: 3:49 PM     Session Length: 48 minutes    Session #: 118    Attendees: Client attended alone    Service Modality:  Video Visit:      Provider verified identity through the following two step process.  Patient provided:  Patient is known previously to provider    Telemedicine Visit: The patient's condition can be safely assessed and treated via synchronous audio and visual telemedicine encounter.      Reason for Telemedicine Visit: Services only offered telehealth    Originating Site (Patient Location): Patient's home    Distant Site (Provider Location): Provider Remote Setting- Home Office    Consent:  The patient/guardian has verbally consented to: the potential risks and benefits of telemedicine (video visit) versus in person care; bill my insurance or make self-payment for services provided; and responsibility for payment of non-covered services.     Patient would like the video invitation sent by:  My Chart    Mode of Communication:  Video Conference via [employer]well    As the provider I attest to compliance with applicable laws and regulations related to telemedicine.    DATA  Extended Session (53+ minutes): PROLONGED SERVICE IN THE OUTPATIENT SETTING REQUIRING DIRECT (FACE-TO-FACE) PATIENT CONTACT BEYOND THE USUAL SERVICE:    - High distress and under complex circumstances.  See Data section for details  Interactive Complexity: No  Crisis: No        Progress Since Last Session (Related to Symptoms / Goals / Homework):   Symptoms: Continued frustration with not moving forward    Homework: Progress made  Go to pool   Paint the porch  Go through paperwork for taxes  Look into a couples therapist     In the future:  Get back to budgeting       Episode of Care Goals: Satisfactory progress - ACTION (Actively  working towards change); Intervened by reinforcing change plan / affirming steps taken     Current / Ongoing Stressors and Concerns:   Patient is currently socially isolated. She has a conflictual relationship with her .  She is getting minimal physical activity.  She had recent eye surgery and shoulder surgery.     Treatment Objective(s) Addressed in This Session:   Patient will increase frequency of engaging her in ADLs.  Patient will track and record at least 5 pleasant exchanges with . Patient will be able to identify at least 5 positive traits about her .  Patient will reduce level of depressive and anxious features as evidenced by reduction in score on her CHAVO-7 and PHQ-9 (scores of 15 and 16 at first measurment, respectively).     Intervention:   Supportive Therapy: Discussed patient's recent phlebotomy and how it was impacitng her. Talked through other medical concerns recently and how she feels overwhelmed by them. Discussed what to possibly to do help with feel so overwhelmed. Discussed support system.       The following assessments were completed by patient for this visit:  PHQ9: of note, patient reported she was in a hurry and didn't read all questions, she reports no suicidal ideation  PHQ-9 SCORE 5/16/2022 6/9/2022 6/14/2022 6/21/2022 6/24/2022 6/28/2022 6/30/2022   PHQ-9 Total Score MyChart 19 (Moderately severe depression) 18 (Moderately severe depression) 12 (Moderate depression) 16 (Moderately severe depression) 14 (Moderate depression) 14 (Moderate depression) 17 (Moderately severe depression)   PHQ-9 Total Score 19 18 12 16 14 14 17   Some encounter information is confidential and restricted. Go to Review Flowsheets activity to see all data.     GAD7:   CHAVO-7 SCORE 5/2/2022 5/2/2022 5/10/2022 5/10/2022 5/10/2022 6/9/2022 6/24/2022   Total Score - 16 (severe anxiety) - - 18 (severe anxiety) 14 (moderate anxiety) 16 (severe anxiety)   Total Score 16 16 18 18 18 14 16   Some  encounter information is confidential and restricted. Go to Review Flowsheets activity to see all data.     PROMIS 10-Global Health (only subscores and total score):   PROMIS-10 Scores Only 12/14/2021 3/15/2022 3/15/2022 3/15/2022 3/24/2022 4/1/2022 6/9/2022   Global Mental Health Score 6 6 6 6 8 12 12   Global Physical Health Score 9 9 9 9 8 11 10   PROMIS TOTAL - SUBSCORES 15 15 15 15 16 23 22        ASSESSMENT: Current Emotional / Mental Status (status of significant symptoms):   Risk status (Self / Other harm or suicidal ideation)   Patient denies current fears or concerns for personal safety.   Patient denies current or recent suicidal ideation or behaviors.   Patient denies current or recent homicidal ideation or behaviors.   Patient denies current or recent self injurious behavior or ideation.   Patient denies other safety concerns.   Patient reports there has been no change in risk factors since their last session.     Patient reports there has been no change in protective factors since their last session.     A safety and risk management plan has been developed including: Patient has no change in safety concerns. Committed to safety and agreed to follow previously developed safety plan..  Patient consented to co-developed safety plan.  Safety and risk management plan was completed.  Patient agreed to use safety plan should any safety concerns arise.  A copy was given to the patient.     Appearance:   Appropriate    Eye Contact:   Good    Psychomotor Behavior: Normal    Attitude:   Cooperative    Orientation:   All   Speech    Rate / Production: Normal/ Responsive    Volume:  Normal    Mood:    Normal   Affect:    Appropriate    Thought Content:  Clear    Thought Form:  Coherent    Insight:    Good      Medication Review:   No changes to current psychiatric medication(s)     Medication Compliance:   Yes     Changes in Health Issues:   None reported     Chemical Use Review:   Substance Use: Chemical use  reviewed, no active concerns identified Nothing used since 2021.     Tobacco Use: No current tobacco use.      Diagnosis:  1. Bipolar 2 disorder (H)    2. Generalized anxiety disorder    3. Trauma and stressor-related disorder      Collateral Reports Completed:   Not Applicable    PLAN: (Patient Tasks / Therapist Tasks / Other)  Go to pool  Paint the porch  Go through paperwork for taxes  Look into a couples therapist     In the future:  Get back to budgeting      There has been demonstrated improvement in functioning while patient has been engaged in psychotherapy/psychological service- if withdrawn the patient would deteriorate and/or relapse.     MICAELA SLADE   2022                                                        ______________________________________________________________________    Individual Treatment Plan    Patient's Name: Randi Cleary  YOB: 1955    Date of Creation: 20  Date Treatment Plan Last Reviewed/Revised: 22    DSM5 Diagnoses: 296.89 Bipolar II Disorder Depressed, 300.02 (F41.1) Generalized Anxiety Disorder or Adjustment Disorders  309.89 (F43.8) Other Specified Trauma and Stressor Related Disorder  Psychosocial / Contextual Factors: Patient's entire family of origin has , she now has a sister-in-law and  as support.  Relationship with  is conflictual. She is recovering from surgeries  PROMIS (reviewed every 90 days): PROMIS-10 Scores  PROMIS 10-Global Health (only subscores and total score):   PROMIS-10 Scores Only 2021 3/15/2022 3/15/2022 3/15/2022 3/24/2022 2022 2022   Global Mental Health Score 6 6 6 6 8 12 12   Global Physical Health Score 9 9 9 9 8 11 10   PROMIS TOTAL - SUBSCORES 15 15 15 15 16 23 22       Referral / Collaboration:  Referral to another professional/service is not indicated at this time..    Anticipated number of session for this episode of care: 50  Anticipation frequency of session:  "Biweekly  Anticipated Duration of each session: 53 or more minutes due to intensity of trauma symptoms  Treatment plan will be reviewed in 90 days or when goals have been changed.   There has been demonstrated improvement in functioning while patient has been engaged in psychotherapy/psychological service- if withdrawn the patient would deteriorate and/or relapse.       MeasurableTreatment Goal(s) related to diagnosis / functional impairment(s)  Goal 1: Patient will \"jumpstart, getting going with the things I need to be doing around the house as far as picking up, doing things, trying to do something every day.  Also to lessen the animosity between me and my .\"    I will know I've met my goal when my shoulders are fixed and I can see.      Objective #A (Patient Action)    Patient will increase frequency of engaging her in ADLs.  Status: Continued - Date(s): 12/10/21, 3/9/22, 6/9/22    Intervention(s)  Therapist will engage patient in CBT, specifically behavioral activation.    Objective #B  Patient will  track and record at least 5 pleasant exchanges with . Patient will be able to identify at least 5 positive traits about her  and how he relates to her.  Status: Continued - Date(s): 12/10/21, 3/9/22, 6/9/22    Intervention(s)  Therapist will teach assertiveness skills and assign homework related to relationship interactions.    Objective #C  Patient will reduce level of depressive and anxious features as evidenced by reduction in score on her CHAVO-7 and PHQ-9 (scores of 15 and 16 at first measurment, respectively).  Status: Continued - Date(s): 12/10/21, 3/9/22, 6/9/22    Intervention(s)  Therapist will engage patient in person-centered therapy and CBT.    Patient has reviewed and agreed to the above plan.      MICAELA SLADE  June 9, 2022                                                   Randi Cleary          SAFETY PLAN:  Step 1: Warning signs / cues (Thoughts, images, mood, situation, " "behavior) that a crisis may be developing:  ? Thoughts: \"I don't want to continue\" \"I am unwanted\"  ? Images: none  ? Thinking Processes: ruminating  ? Mood: anger  ? Behaviors: isolating/withdrawing , can't stop crying, not taking care of myself and not taking care of my responsibilities  ? Situations: small triggers, such as not being able to find something, or dropping something   Step 2: Coping strategies - Things I can do to take my mind off of my problems without contacting another person (relaxation technique, physical activity):  ? Distress Tolerance Strategies:  arts and crafts: drawing, play with my pet , listen to positive and upbeat music: any, change body temperature (ice pack/cold water)  and paced breathing/progressive muscle relaxation  ? Physical Activities: go for a walk, deep breathing and stretching   ? Focus on helpful thoughts:  \"You've been through this before, you can get through it again.\"  Step 3: People and social settings that provide distraction:                 Name: Carmen                            Name: Darien                           Name: Aleida       ? pool, shopping, Carmen's house, [employer]       Step 4: Remind myself of people and things that are important to me and worth living for:  Clifford Little Donna, post-COVID world, options of what could be in your future        Step 5: When I am in crisis, I can ask these people to help me use my safety plan:                 Name: Sidney  Step 6: Making the environment safe:   ? go to sleep/daydream  Step 7: Professionals or agencies I can contact during a crisis:  ? [facility] Number: [phone]  ? Suicide Prevention Lifeline: 9-724-267-MVEF (1160)  ? Crisis Text Line Service (available 24 hours a day, 7 days a week): Text [place] to 820801    Local Crisis Services: [facility]: [phone]     Call 911 or go to my nearest emergency department.       I helped develop this safety plan and agree to use it " when needed.  I have been given a copy of this plan.       Client signature _________________________________________________________________  Today s date:  11/24/2020  Adapted from Safety Plan Template 2008 Randi Poole and Robby Barba is reprinted with the express permission of the authors.  No portion of the Safety Plan Template may be reproduced without the express, written permission.  You can contact the authors at [email or web address] or [email or web address].

## 2022-07-11 ENCOUNTER — ONCOLOGY VISIT (OUTPATIENT)
Dept: ONCOLOGY | Facility: CLINIC | Age: 67
End: 2022-07-11
Attending: INTERNAL MEDICINE
Payer: MEDICARE

## 2022-07-11 VITALS
HEART RATE: 97 BPM | SYSTOLIC BLOOD PRESSURE: 122 MMHG | BODY MASS INDEX: 40.63 KG/M2 | DIASTOLIC BLOOD PRESSURE: 82 MMHG | WEIGHT: 251.7 LBS | TEMPERATURE: 98.3 F | OXYGEN SATURATION: 93 %

## 2022-07-11 DIAGNOSIS — E83.119 HEMOCHROMATOSIS, UNSPECIFIED HEMOCHROMATOSIS TYPE: Primary | ICD-10-CM

## 2022-07-11 DIAGNOSIS — R74.8 ABNORMAL LEVELS OF OTHER SERUM ENZYMES: ICD-10-CM

## 2022-07-11 PROCEDURE — 99214 OFFICE O/P EST MOD 30 MIN: CPT | Performed by: INTERNAL MEDICINE

## 2022-07-11 PROCEDURE — G0463 HOSPITAL OUTPT CLINIC VISIT: HCPCS

## 2022-07-11 ASSESSMENT — PAIN SCALES - GENERAL: PAINLEVEL: SEVERE PAIN (6)

## 2022-07-11 NOTE — NURSING NOTE
"Oncology Rooming Note    July 11, 2022 2:37 PM   Randi Cleary is a 66 year old female who presents for:    Chief Complaint   Patient presents with     Oncology Clinic Visit     Hemochromatosis     Initial Vitals: /82   Pulse 97   Temp 98.3  F (36.8  C) (Oral)   Wt 114.2 kg (251 lb 11.2 oz)   SpO2 93%   BMI 40.63 kg/m   Estimated body mass index is 40.63 kg/m  as calculated from the following:    Height as of 6/22/22: 1.676 m (5' 6\").    Weight as of this encounter: 114.2 kg (251 lb 11.2 oz). Body surface area is 2.31 meters squared.  Severe Pain (6) Comment: Data Unavailable   No LMP recorded. Patient is postmenopausal.  Allergies reviewed: Yes  Medications reviewed: Yes    Medications: Medication refills not needed today.  Pharmacy name entered into BitMethod:    [id number] - [place], [place] - [address] [id number] - [place], [place] - [address]  [facility] [employer] - [place], [place] - [address]  [facility] - [place], [place] - [address]     Clinical concerns: Pt would like to discuss MRI results.She states she is having ongoing issues with both of her legs, they feel heavy, possibly retaining fluid. Pt would also like to discuss the neuropathy in her right leg that is causing issues.       Rita Meeks"

## 2022-07-11 NOTE — LETTER
2022         RE: Randi Cleary  [address] [facility] [address]        Dear Colleague,    Thank you for referring your patient, Randi Cleary, to the [facility]. Please see a copy of my visit note below.        [facility] Hematology Consultation    Outpatient Visit Note:    Patient: Randi Damon  MRN: [id number]  : 1955  YUAN: 20    Reason for Consultation:  Two copies of the C282Y mutation - hereditary hemochromatosis    Assessment:  In summary, Randi Damon is a 66 year old woman with hereditary hemochromatosis and Past Medical History of KYAW, major depressive disorder, serotonin syndrome, pheochromocytoma,     1. Hereditary hemochromatosis  2. Restless leg syndrome  3. Major depressive disorder  4. Obstructive sleep apnea  5. Polycythemia, likely secondary to to #4    For hereditary hemochromatosis we typically try to remove excess iron by doing serial phlebotomy to reduce iron (ferritin) to goal of 50. However, in Ms. Damon's case, she has severe restless leg and fatigue therefore would permit for target ferritin of 125. Would NOT allow for ferritin to exceed 250 in this patient. Recently had phlebotomy- ferritin is 60     Ms. Damon has polycythemia. This is NOT related to HH, as iron status is regulated separate from this. This is likely secondary to her untreated KYAW, however if she were to develop leukocytosis or thrombocytosis would consider sending evaluation for MPN.     For neuropathy- history of B12 deficiency- currently not an issue will check copper. Likely secondary to diabetes.     Recently had COVID- now off steroids.       Plan:  1. Majority of today's visit was spent counseling the patient regarding iron status, etc.  2.   Orders Placed This Encounter   Procedures     Copper level   3. Phlebotomy for ferritin >250      The patient is given our center's contact information and is instructed to  "call if she should have any further questions or concerns.  Otherwise, we will plan on seeing her back Follow up with Provider - labs in 3 months and follow up with provider with labs in 6 months .      Tova Pablo MD/PhD   of Medicine  [facility] [employer]   ----------------------------------------------------------------------------------------------------------------------    Interval History:  Randi Damon is a 66 year old woman who is homozygous for Hereditary hemochromatosis C282Y mutation with complex medical history who presents to establish care in my clinic after her previous hematologist, Dr. Hill has relocated out of [facility]. Please refer to my previous note from 7/13/2021 or Dr. Hill's previous notes.     Winnie is dealing with neuropathy in her hands. Recent neck surgery did not clear all the symptoms. Also has severe pain in her right wrist- imaging demonstrates marrow edema. Reports some neuropathy in her lower legs. Feels like \"pillows\" under her feet.     Past Medical History:  Past Medical History:   Diagnosis Date     Anxiety      Bipolar disorder (H)      Breast cancer (H) 1986    lumpectomy, radiation, chemo     Chronic pain syndrome      COPD (chronic obstructive pulmonary disease) (H)     asthma     Cord compression (H) 12/21/2021     Depression, major, recurrent (H)      Dizzy      Drug tolerance     opioid     Esophageal reflux      Fatigue      Graves disease 1994     Hemochromatosis 02/14/2018    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 Created by Conversion  Replacement Utility updated for latest IMO load Formatting of this note might be different from the original. Created by Conversion  Replacement Utility updated for latest IMO load     History of corticosteroid therapy 11/19/2019     History of partial adrenalectomy " (H) 11/19/2019     History of pheochromocytoma 11/19/2019     Hx antineoplastic chemotherapy      Hx of radiation therapy      Hyperlipidaemia      Hypertension      Impaired fasting glucose 2017     Injury of neck, whiplash 07/15/2021     Joint pain      Morbid obesity (H)      KYAW (obstructive sleep apnea) 2016     Osteopenia      Pheochromocytoma, left 08/02/2017    laparoscopically removed     Postablative hypothyroidism 1995     Prediabetes 10/03/2019    by A1c     Psoriasis      Psoriatic arthropathy (H)      Right rotator cuff tear      RLS (restless legs syndrome)     on ropinorole     Sacroiliitis (H)      Serotonin syndrome 08/28/2020    [facility]     Snoring      Spinal stenosis      Status post coronary angiogram 10/03/2019     Urinary incontinence      Vitamin B 12 deficiency 2009     Vitamin D deficiency 2010     Past Surgical History:  Past Surgical History:   Procedure Laterality Date     ARTHRODESIS ANKLE       ARTHROPLASTY ANKLE Right 6/29/2015    Procedure: ARTHROPLASTY ANKLE;  Surgeon: Jason Coughlin MD;  Location: [facility]     ARTHROPLASTY REVISION ANKLE Right 6/29/2015    Procedure: ARTHROPLASTY REVISION ANKLE;  Surgeon: Jason Coughlin MD;  Location: [facility]     BIOPSY BREAST       BREAST BIOPSY, CORE RT/LT       COLONOSCOPY       COLONOSCOPY N/A 2/25/2021    Procedure: COLONOSCOPY;  Surgeon: Guru Elke Tolbert MD;  Location:  GI     CV CORONARY ANGIOGRAM N/A 10/3/2019    Procedure: CV CORONARY ANGIOGRAM;  Surgeon: Bryce Pierre MD;  Location:  HEART CARDIAC CATH LAB     CV RIGHT HEART CATH MEASUREMENTS RECORDED N/A 10/3/2019    Procedure: CV RIGHT HEART CATH;  Surgeon: Bryce Pierre MD;  Location:  HEART CARDIAC CATH LAB     ESOPHAGOSCOPY, GASTROSCOPY, DUODENOSCOPY (EGD), COMBINED N/A 2/25/2021    Procedure: ESOPHAGOGASTRODUODENOSCOPY, WITH BIOPSY;  Surgeon: Guru Elke Tolbert MD;  Location:  GI     EYE SURGERY  2021      HC REMOVE TONSILS/ADENOIDS,<11 Y/O      Description: Tonsillectomy With Adenoidectomy;  Recorded: 04/07/2010;     IR LUMBAR EPIDURAL STEROID INJECTION  10/26/2004     IR LUMBAR EPIDURAL STEROID INJECTION  11/16/2004     IR LUMBAR EPIDURAL STEROID INJECTION  12/21/2004     IR LUMBAR EPIDURAL STEROID INJECTION  6/8/2006     JOINT REPLACEMENT       LAMINOPLASTY CERVICAL POSTERIOR THREE+ LEVELS Left 12/21/2021    Procedure: CERVICAL 3-CERVICAL 6 LEFT OPEN DOOR LAMINOPLASTY AND LEFT CERVICAL 4-5 AND CERVICAL 6-7 POSTERIOR FORAMINOTOMY;  Surgeon: Angela Gregory MD;  Location: [facility]     LAPAROSCOPIC ADRENALECTOMY Left 08/02/2017    pheochromocytoma     LAPAROSCOPIC ADRENALECTOMY Left 8/2/2017    Procedure: LAPAROSCOPIC LEFT ADRENALECTOMY, ;  Surgeon: Gab Linares MD;  Location: [facility];  Service:      LENGTHEN TENDON ACHILLES Right 6/29/2015    Procedure: LENGTHEN TENDON ACHILLES;  Surgeon: Jason Coughlin MD;  Location: [facility]     LUMPECTOMY BREAST       LUMPECTOMY BREAST Left 1994     MAMMOPLASTY REDUCTION Right 01/13/2015    [place]     MAMMOPLASTY REDUCTION Right     approx late [id number]     MASTECTOMY      left lumpectomy with axillary node dissection     MASTECTOMY MODIFIED RADICAL       OTHER SURGICAL HISTORY Right     reconstructive breast surgery     OTHER SURGICAL HISTORY      Adrenalectomy for pheochromocytoma     CA MASTECTOMY, MODIFIED RADICAL      Description: Modified Radical Mastectomy Left Breast;  Recorded: 04/07/2010;     REPAIR HAMMER TOE Right 6/29/2015    Procedure: REPAIR HAMMER TOE;  Surgeon: Jason Coughlin MD;  Location: [facility]     TONSILLECTOMY       TONSILLECTOMY & ADENOIDECTOMY       ZZC ARTHRODESIS,ANKLE,OPEN Right     Description: Ankle Arthrodesis;  Recorded: 04/07/2010;       Medications:  Current Outpatient Medications   Medication Sig Dispense Refill     acetaminophen (TYLENOL) 325 MG tablet Take 2 tablets (650 mg) by mouth every 4 hours as  needed for other (For optimal non-opioid multimodal pain management to improve pain control.)       albuterol (PROVENTIL) (2.5 MG/3ML) 0.083% neb solution Take 1 vial (2.5 mg) by nebulization every 4 hours as needed for shortness of breath / dyspnea or wheezing 360 mL 5     albuterol (VENTOLIN HFA) 108 (90 Base) MCG/ACT inhaler Inhale 2 puffs into the lungs every 6 hours 18 g 11     benzonatate (TESSALON) 200 MG capsule Take 1 capsule (200 mg) by mouth 3 times daily as needed for cough 20 capsule 1     cetirizine (ZYRTEC) 10 MG tablet Take 1 tablet (10 mg) by mouth daily 30 tablet 1     Cholecalciferol (VITAMIN D3) 250 MCG (78031 UT) TABS Take 1 tablet by mouth daily 60123/day       Cyanocobalamin (VITAMIN B-12) 5000 MCG SUBL Place 2-3 sprays under the tongue daily Unknown dose. 2 or 3 sprays/day       ethacrynic acid (EDECRIN) 25 MG tablet Take 1 tablet by mouth on Saturday and Wednesday 30 tablet 11     Fluticasone-Umeclidin-Vilanterol (TRELEGY ELLIPTA) 200-62.5-25 MCG/INH oral inhaler Inhale 1 puff into the lungs daily 1 each 11     guaiFENesin (MUCINEX) 600 MG 12 hr tablet Take 1 tablet (600 mg) by mouth 2 times daily 60 tablet 2     HYDROcodone-acetaminophen (NORCO) 5-325 MG tablet Take 1 tablet by mouth 3 times daily as needed for breakthrough pain or moderate to severe pain 70 tablet 0     hydrOXYzine (ATARAX) 25 MG tablet Take 1 tablet by mouth daily as needed       LITHIUM PO Take 25 mg by mouth daily OTC lithium oratate       losartan (COZAAR) 25 MG tablet Take 1 tablet (25 mg) by mouth daily 90 tablet 2     magnesium 250 MG tablet Take 1 tablet by mouth 2 times daily       medical cannabis (Patient's own supply) See Admin Instructions (The purpose of this order is to document that the patient reports taking medical cannabis.  This is not a prescription, and is not used to certify that the patient has a qualifying medical condition.)       methocarbamol (ROBAXIN) 500 MG tablet Take 1 tablet (500 mg) by  "mouth 4 times daily Wean down on your frequency. - May fill/start 3/31/22 112 tablet 3     omeprazole (PRILOSEC) 20 MG DR capsule Take 1 capsule (20 mg) by mouth daily 90 capsule 3     OXcarbazepine (TRILEPTAL) 150 MG tablet Take one tablet daily at bedtime 30 tablet 3     potassium chloride ER (KLOR-CON M) 20 MEQ CR tablet Take 1 tablet (20 mEq) by mouth daily 90 tablet 3     predniSONE (DELTASONE) 20 MG tablet Take 2 tabs daily for 4 days One tab daily for 4 days Half tab daily for 4 days 14 tablet 0     rOPINIRole (REQUIP) 2 MG tablet Take 1-2 tablets (2-4 mg) by mouth At Bedtime 120 tablet 3     SYNTHROID 150 MCG tablet Take 1 tablet (150 mcg) by mouth daily 90 tablet 1     vitamin E 400 units TABS Take 800 Units by mouth daily          Allergies:  Allergies   Allergen Reactions     Serotonin Reuptake Inhibitors Anxiety, Difficulty breathing, Headache, Palpitations and Shortness Of Breath     Buspirone      The patient states she had serotonin syndrome     Cephalexin      Other reaction(s): unknown rxn.     Desvenlafaxine      Serotonin syndrome     Diclofenac Sodium [Diclofenac]      Serotonin syndrome and restless legs syndrome     Gabapentin      Drove on the wrong side of the highway     Levofloxacin      \"CAN'T REMEMBER\"     Penicillins      \"SORES IN MOUTH\"     Riluzole Difficulty breathing and Swelling     Sulfa Drugs      \"PT DOES NOT KNOW WHAT THE REACTION WAS\"       ROS:  A 10 point ROS is negative except as stated in the HPI    Social History:  Denies any tobacco use. Denies any illicit drug use.     Family History:  Reviewed- no interval updates    Objective:  /82   Pulse 97   Temp 98.3  F (36.8  C) (Oral)   Wt 114.2 kg (251 lb 11.2 oz)   SpO2 93%   BMI 40.63 kg/m      Exam:   Constitutional: obese woman, appears older than stated age.   HEENT: Pupils equal and reactive to light. No scleral icterus or hemorrhage. Due to COVID precautions mask in place  CV: regular rate and rhythm, no " murmurs  Respiratory: clear  GI:obese, unable to discern HSM  Mus/Skele: no edema  Skin: no petechiae, no ecchymosis.  Neuro: CN II-XII intact. Normal gait. AOx3  Psych: talkative, bright. Tangential.  Heme/Lymph: no supraclavicular, axillary or umbilical adenopathy.     Labs: personally reviewed with relevant trends annotated below  Ferritin   Date Value Ref Range Status   07/01/2022 60 8 - 252 ng/mL Final   06/07/2022 125 10 - 130 ng/mL Final   02/23/2022 78 10 - 130 ng/mL Final   11/16/2021 101 8 - 252 ng/mL Final   10/01/2021 93 10 - 130 ng/mL Final   07/13/2021 44 8 - 252 ng/mL Final   02/05/2021 48 8 - 252 ng/mL Final   10/30/2020 20 8 - 252 ng/mL Final   02/12/2020 19 8 - 252 ng/mL Final   09/24/2019 33 8 - 252 ng/mL Final   09/19/2019 36 8 - 252 ng/mL Final   09/13/2019 42 8 - 252 ng/mL Final     Iron   Date Value Ref Range Status   07/01/2022 127 35 - 180 ug/dL Final   06/07/2022 216 (H) 35 - 180 ug/dL Final   02/23/2022 145 35 - 180 ug/dL Final   11/16/2021 270 (H) 35 - 180 ug/dL Final   10/01/2021 182 (H) 35 - 180 ug/dL Final   07/13/2021 165 35 - 180 ug/dL Final   02/05/2021 141 35 - 180 ug/dL Final   01/06/2021 78 42 - 175 ug/dL Final   10/30/2020 52 35 - 180 ug/dL Final   08/03/2020 51 42 - 175 ug/dL Final   09/24/2019 92 35 - 180 ug/dL Final   08/13/2019 237 (H) 35 - 180 ug/dL Final     WBC   Date Value Ref Range Status   02/05/2021 6.9 4.0 - 11.0 10e9/L Final     WBC Count   Date Value Ref Range Status   07/01/2022 7.9 4.0 - 11.0 10e3/uL Final     Hemoglobin   Date Value Ref Range Status   07/01/2022 15.8 (H) 11.7 - 15.7 g/dL Final   06/07/2022 16.4 (H) 11.7 - 15.7 g/dL Final   02/23/2022 15.4 11.7 - 15.7 g/dL Final   12/23/2021 14.6 11.7 - 15.7 g/dL Final   12/22/2021 14.4 11.7 - 15.7 g/dL Final   12/13/2021 15.7 11.7 - 15.7 g/dL Final   02/05/2021 17.7 (H) 11.7 - 15.7 g/dL Final   10/30/2020 15.4 11.7 - 15.7 g/dL Final   03/09/2020 14.3 11.7 - 15.7 g/dL Final   02/12/2020 14.0 11.7 - 15.7 g/dL  Final   10/03/2019 14.6 11.7 - 15.7 g/dL Final   09/24/2019 15.7 11.7 - 15.7 g/dL Final       Imaging:  10/30/20  Average liver iron concentration is 0.7 mg/g dry tissue (normal = 0.17  - 1.8)  Average liver iron concentration is 12 mmol/kg dry tissue (normal = 3  - 33)        Again, thank you for allowing me to participate in the care of your patient.      Sincerely,    Tova Pablo MD

## 2022-07-11 NOTE — PROGRESS NOTES
[facility] Consultation    Outpatient Visit Note:    Patient: Randi Damon  MRN: [id number]  : 1955  YUAN: 22    Reason for Consultation:  Two copies of the C282Y mutation - hereditary hemochromatosis    Assessment:  In summary, Randi Damon is a 66 year old woman with hereditary hemochromatosis and Past Medical History of KYAW, major depressive disorder, serotonin syndrome, pheochromocytoma,     1. Hereditary hemochromatosis  2. Restless leg syndrome  3. Major depressive disorder  4. Obstructive sleep apnea  5. Polycythemia, likely secondary to to #4    For hereditary hemochromatosis we typically try to remove excess iron by doing serial phlebotomy to reduce iron (ferritin) to goal of 50. However, in Ms. Damon's case, she has severe restless leg and fatigue therefore would permit for target ferritin of 125. Would NOT allow for ferritin to exceed 250 in this patient. Recently had phlebotomy- ferritin is 60     Ms. Damon has polycythemia. This is NOT related to HH, as iron status is regulated separate from this. This is likely secondary to her untreated KYAW, however if she were to develop leukocytosis or thrombocytosis would consider sending evaluation for MPN.     For neuropathy- history of B12 deficiency- currently not an issue will check copper. Likely secondary to diabetes.     Recently had COVID- now off steroids.       Plan:  1. Majority of today's visit was spent counseling the patient regarding iron status, etc.  2.   Orders Placed This Encounter   Procedures     Copper level   3. Phlebotomy for ferritin >250      The patient is given our center's contact information and is instructed to call if she should have any further questions or concerns.  Otherwise, we will plan on seeing her back Follow up with Provider - labs in 3 months and follow up with provider with labs in 6 months .      Tova Pablo MD/PhD   of Medicine  [facility]   ----------------------------------------------------------------------------------------------------------------------    Interval History:  Randi Damon is a 66 year old woman who is homozygous for Hereditary hemochromatosis C282Y mutation with complex medical history who presents to establish care in my clinic after her previous hematologist, Dr. Hill has relocated out of [facility]. Please refer to my previous note from 7/13/2021 or Dr. Hill's previous notes.     Winnie is dealing with neuropathy in her hands. Recent neck surgery did not clear all the symptoms. Also has severe pain in her right wrist- imaging demonstrates marrow edema. Reports some neuropathy in her lower legs. Feels like \"pillows\" under her feet.     Past Medical History:  Past Medical History:   Diagnosis Date     Anxiety      Bipolar disorder (H)      Breast cancer (H) 1986    lumpectomy, radiation, chemo     Chronic pain syndrome      COPD (chronic obstructive pulmonary disease) (H)     asthma     Cord compression (H) 12/21/2021     Depression, major, recurrent (H)      Dizzy      Drug tolerance     opioid     Esophageal reflux      Fatigue      Graves disease 1994     Hemochromatosis 02/14/2018    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 Created by Conversion  Replacement Utility updated for latest IMO load Formatting of this note might be different from the original. Created by Conversion  Replacement Utility updated for latest IMO load     History of corticosteroid therapy 11/19/2019     History of partial adrenalectomy (H) 11/19/2019     History of pheochromocytoma 11/19/2019     Hx antineoplastic chemotherapy      Hx of radiation therapy      Hyperlipidaemia      Hypertension      Impaired fasting glucose 2017     Injury of neck, whiplash 07/15/2021     Joint pain      Morbid obesity " (H)      KYAW (obstructive sleep apnea) 2016     Osteopenia      Pheochromocytoma, left 08/02/2017    laparoscopically removed     Postablative hypothyroidism 1995     Prediabetes 10/03/2019    by A1c     Psoriasis      Psoriatic arthropathy (H)      Right rotator cuff tear      RLS (restless legs syndrome)     on ropinorole     Sacroiliitis (H)      Serotonin syndrome 08/28/2020    [facility]     Snoring      Spinal stenosis      Status post coronary angiogram 10/03/2019     Urinary incontinence      Vitamin B 12 deficiency 2009     Vitamin D deficiency 2010     Past Surgical History:  Past Surgical History:   Procedure Laterality Date     ARTHRODESIS ANKLE       ARTHROPLASTY ANKLE Right 6/29/2015    Procedure: ARTHROPLASTY ANKLE;  Surgeon: Jason Coughlin MD;  Location: [facility]     ARTHROPLASTY REVISION ANKLE Right 6/29/2015    Procedure: ARTHROPLASTY REVISION ANKLE;  Surgeon: Jason Coughlin MD;  Location: [facility]     BIOPSY BREAST       BREAST BIOPSY, CORE RT/LT       COLONOSCOPY       COLONOSCOPY N/A 2/25/2021    Procedure: COLONOSCOPY;  Surgeon: Guru Elke Tolbert MD;  Location:  GI     CV CORONARY ANGIOGRAM N/A 10/3/2019    Procedure: CV CORONARY ANGIOGRAM;  Surgeon: Bryce Pierre MD;  Location:  HEART CARDIAC CATH LAB     CV RIGHT HEART CATH MEASUREMENTS RECORDED N/A 10/3/2019    Procedure: CV RIGHT HEART CATH;  Surgeon: Bryce Pierre MD;  Location:  HEART CARDIAC CATH LAB     ESOPHAGOSCOPY, GASTROSCOPY, DUODENOSCOPY (EGD), COMBINED N/A 2/25/2021    Procedure: ESOPHAGOGASTRODUODENOSCOPY, WITH BIOPSY;  Surgeon: Guru Elke Tolbert MD;  Location:  GI     EYE SURGERY  2021     HC REMOVE TONSILS/ADENOIDS,<13 Y/O      Description: Tonsillectomy With Adenoidectomy;  Recorded: 04/07/2010;     IR LUMBAR EPIDURAL STEROID INJECTION  10/26/2004     IR LUMBAR EPIDURAL STEROID INJECTION  11/16/2004     IR LUMBAR EPIDURAL STEROID INJECTION   12/21/2004     IR LUMBAR EPIDURAL STEROID INJECTION  6/8/2006     JOINT REPLACEMENT       LAMINOPLASTY CERVICAL POSTERIOR THREE+ LEVELS Left 12/21/2021    Procedure: CERVICAL 3-CERVICAL 6 LEFT OPEN DOOR LAMINOPLASTY AND LEFT CERVICAL 4-5 AND CERVICAL 6-7 POSTERIOR FORAMINOTOMY;  Surgeon: Angela Gregory MD;  Location: [facility]     LAPAROSCOPIC ADRENALECTOMY Left 08/02/2017    pheochromocytoma     LAPAROSCOPIC ADRENALECTOMY Left 8/2/2017    Procedure: LAPAROSCOPIC LEFT ADRENALECTOMY, ;  Surgeon: Gab Linares MD;  Location: [facility] OR;  Service:      LENGTHEN TENDON ACHILLES Right 6/29/2015    Procedure: LENGTHEN TENDON ACHILLES;  Surgeon: Jason Coughlin MD;  Location: [facility]     LUMPECTOMY BREAST       LUMPECTOMY BREAST Left 1994     MAMMOPLASTY REDUCTION Right 01/13/2015    [place]     MAMMOPLASTY REDUCTION Right     approx late 2015/early2016     MASTECTOMY      left lumpectomy with axillary node dissection     MASTECTOMY MODIFIED RADICAL       OTHER SURGICAL HISTORY Right     reconstructive breast surgery     OTHER SURGICAL HISTORY      Adrenalectomy for pheochromocytoma     IL MASTECTOMY, MODIFIED RADICAL      Description: Modified Radical Mastectomy Left Breast;  Recorded: 04/07/2010;     REPAIR HAMMER TOE Right 6/29/2015    Procedure: REPAIR HAMMER TOE;  Surgeon: Jason Coughlin MD;  Location: [facility]     TONSILLECTOMY       TONSILLECTOMY & ADENOIDECTOMY       ZZC ARTHRODESIS,ANKLE,OPEN Right     Description: Ankle Arthrodesis;  Recorded: 04/07/2010;       Medications:  Current Outpatient Medications   Medication Sig Dispense Refill     acetaminophen (TYLENOL) 325 MG tablet Take 2 tablets (650 mg) by mouth every 4 hours as needed for other (For optimal non-opioid multimodal pain management to improve pain control.)       albuterol (PROVENTIL) (2.5 MG/3ML) 0.083% neb solution Take 1 vial (2.5 mg) by nebulization every 4 hours as needed for shortness of breath / dyspnea or wheezing  360 mL 5     albuterol (VENTOLIN HFA) 108 (90 Base) MCG/ACT inhaler Inhale 2 puffs into the lungs every 6 hours 18 g 11     benzonatate (TESSALON) 200 MG capsule Take 1 capsule (200 mg) by mouth 3 times daily as needed for cough 20 capsule 1     cetirizine (ZYRTEC) 10 MG tablet Take 1 tablet (10 mg) by mouth daily 30 tablet 1     Cholecalciferol (VITAMIN D3) 250 MCG (89667 UT) TABS Take 1 tablet by mouth daily 51206/day       Cyanocobalamin (VITAMIN B-12) 5000 MCG SUBL Place 2-3 sprays under the tongue daily Unknown dose. 2 or 3 sprays/day       ethacrynic acid (EDECRIN) 25 MG tablet Take 1 tablet by mouth on Saturday and Wednesday 30 tablet 11     Fluticasone-Umeclidin-Vilanterol (TRELEGY ELLIPTA) 200-62.5-25 MCG/INH oral inhaler Inhale 1 puff into the lungs daily 1 each 11     guaiFENesin (MUCINEX) 600 MG 12 hr tablet Take 1 tablet (600 mg) by mouth 2 times daily 60 tablet 2     HYDROcodone-acetaminophen (NORCO) 5-325 MG tablet Take 1 tablet by mouth 3 times daily as needed for breakthrough pain or moderate to severe pain 70 tablet 0     hydrOXYzine (ATARAX) 25 MG tablet Take 1 tablet by mouth daily as needed       LITHIUM PO Take 25 mg by mouth daily OTC lithium oratate       losartan (COZAAR) 25 MG tablet Take 1 tablet (25 mg) by mouth daily 90 tablet 2     magnesium 250 MG tablet Take 1 tablet by mouth 2 times daily       medical cannabis (Patient's own supply) See Admin Instructions (The purpose of this order is to document that the patient reports taking medical cannabis.  This is not a prescription, and is not used to certify that the patient has a qualifying medical condition.)       methocarbamol (ROBAXIN) 500 MG tablet Take 1 tablet (500 mg) by mouth 4 times daily Wean down on your frequency. - May fill/start 3/31/22 112 tablet 3     omeprazole (PRILOSEC) 20 MG DR capsule Take 1 capsule (20 mg) by mouth daily 90 capsule 3     OXcarbazepine (TRILEPTAL) 150 MG tablet Take one tablet daily at bedtime 30  "tablet 3     potassium chloride ER (KLOR-CON M) 20 MEQ CR tablet Take 1 tablet (20 mEq) by mouth daily 90 tablet 3     predniSONE (DELTASONE) 20 MG tablet Take 2 tabs daily for 4 days One tab daily for 4 days Half tab daily for 4 days 14 tablet 0     rOPINIRole (REQUIP) 2 MG tablet Take 1-2 tablets (2-4 mg) by mouth At Bedtime 120 tablet 3     SYNTHROID 150 MCG tablet Take 1 tablet (150 mcg) by mouth daily 90 tablet 1     vitamin E 400 units TABS Take 800 Units by mouth daily          Allergies:  Allergies   Allergen Reactions     Serotonin Reuptake Inhibitors Anxiety, Difficulty breathing, Headache, Palpitations and Shortness Of Breath     Buspirone      The patient states she had serotonin syndrome     Cephalexin      Other reaction(s): unknown rxn.     Desvenlafaxine      Serotonin syndrome     Diclofenac Sodium [Diclofenac]      Serotonin syndrome and restless legs syndrome     Gabapentin      Drove on the wrong side of the highway     Levofloxacin      \"CAN'T REMEMBER\"     Penicillins      \"SORES IN MOUTH\"     Riluzole Difficulty breathing and Swelling     Sulfa Drugs      \"PT DOES NOT KNOW WHAT THE REACTION WAS\"       ROS:  A 10 point ROS is negative except as stated in the HPI    Social History:  Denies any tobacco use. Denies any illicit drug use.     Family History:  Reviewed- no interval updates    Objective:  /82   Pulse 97   Temp 98.3  F (36.8  C) (Oral)   Wt 114.2 kg (251 lb 11.2 oz)   SpO2 93%   BMI 40.63 kg/m      Exam:   Constitutional: obese woman, appears older than stated age.   HEENT: Pupils equal and reactive to light. No scleral icterus or hemorrhage. Due to COVID precautions mask in place  CV: regular rate and rhythm, no murmurs  Respiratory: clear  GI:obese, unable to discern HSM  Mus/Skele: no edema  Skin: no petechiae, no ecchymosis.  Neuro: CN II-XII intact. Normal gait. AOx3  Psych: talkative, bright. Tangential.  Heme/Lymph: no supraclavicular, axillary or umbilical " adenopathy.     Labs: personally reviewed with relevant trends annotated below  Ferritin   Date Value Ref Range Status   07/01/2022 60 8 - 252 ng/mL Final   06/07/2022 125 10 - 130 ng/mL Final   02/23/2022 78 10 - 130 ng/mL Final   11/16/2021 101 8 - 252 ng/mL Final   10/01/2021 93 10 - 130 ng/mL Final   07/13/2021 44 8 - 252 ng/mL Final   02/05/2021 48 8 - 252 ng/mL Final   10/30/2020 20 8 - 252 ng/mL Final   02/12/2020 19 8 - 252 ng/mL Final   09/24/2019 33 8 - 252 ng/mL Final   09/19/2019 36 8 - 252 ng/mL Final   09/13/2019 42 8 - 252 ng/mL Final     Iron   Date Value Ref Range Status   07/01/2022 127 35 - 180 ug/dL Final   06/07/2022 216 (H) 35 - 180 ug/dL Final   02/23/2022 145 35 - 180 ug/dL Final   11/16/2021 270 (H) 35 - 180 ug/dL Final   10/01/2021 182 (H) 35 - 180 ug/dL Final   07/13/2021 165 35 - 180 ug/dL Final   02/05/2021 141 35 - 180 ug/dL Final   01/06/2021 78 42 - 175 ug/dL Final   10/30/2020 52 35 - 180 ug/dL Final   08/03/2020 51 42 - 175 ug/dL Final   09/24/2019 92 35 - 180 ug/dL Final   08/13/2019 237 (H) 35 - 180 ug/dL Final     WBC   Date Value Ref Range Status   02/05/2021 6.9 4.0 - 11.0 10e9/L Final     WBC Count   Date Value Ref Range Status   07/01/2022 7.9 4.0 - 11.0 10e3/uL Final     Hemoglobin   Date Value Ref Range Status   07/01/2022 15.8 (H) 11.7 - 15.7 g/dL Final   06/07/2022 16.4 (H) 11.7 - 15.7 g/dL Final   02/23/2022 15.4 11.7 - 15.7 g/dL Final   12/23/2021 14.6 11.7 - 15.7 g/dL Final   12/22/2021 14.4 11.7 - 15.7 g/dL Final   12/13/2021 15.7 11.7 - 15.7 g/dL Final   02/05/2021 17.7 (H) 11.7 - 15.7 g/dL Final   10/30/2020 15.4 11.7 - 15.7 g/dL Final   03/09/2020 14.3 11.7 - 15.7 g/dL Final   02/12/2020 14.0 11.7 - 15.7 g/dL Final   10/03/2019 14.6 11.7 - 15.7 g/dL Final   09/24/2019 15.7 11.7 - 15.7 g/dL Final       Imaging:  10/30/20  Average liver iron concentration is 0.7 mg/g dry tissue (normal = 0.17  - 1.8)  Average liver iron concentration is 12 mmol/kg dry tissue (normal  = 3  - 33)

## 2022-07-11 NOTE — PATIENT INSTRUCTIONS
Continue with multiple vitamin that does not have iron.     Repeat labs in 3 months    Follow up with Dr. Pablo in 6 months.       Tova Palbo MD/PhD   of Medicine  Division of Hematology, Oncology and Transplantation    [facility] [address], Mail [id number]  [place] [address]    Clinic Scheduling and Nurse Line: [phone]      Care Coordinator:   Crissy Musa LPN  [facility] [place]  (P) [phone]  (F) [phone]

## 2022-07-12 ENCOUNTER — VIRTUAL VISIT (OUTPATIENT)
Dept: PSYCHOLOGY | Facility: CLINIC | Age: 67
End: 2022-07-12
Payer: MEDICARE

## 2022-07-12 DIAGNOSIS — F31.81 BIPOLAR 2 DISORDER (H): Primary | ICD-10-CM

## 2022-07-12 DIAGNOSIS — F41.1 GENERALIZED ANXIETY DISORDER: ICD-10-CM

## 2022-07-12 DIAGNOSIS — F43.9 TRAUMA AND STRESSOR-RELATED DISORDER: ICD-10-CM

## 2022-07-12 PROCEDURE — 90834 PSYTX W PT 45 MINUTES: CPT | Mod: 95 | Performed by: SOCIAL WORKER

## 2022-07-12 NOTE — PROGRESS NOTES
"    [facility]                                     Progress Note    Patient Name: Randi Cleary  Date: 7/12/22         Service Type: Individual     Session Start Time: 8:42 AM  Session End Time: 9:30 AM     Session Length: 48 minutes    Session #: 119    Attendees: Client attended alone    Service Modality:  Phone Visit:      Provider verified identity through the following two step process.  Patient provided:  Patient is known previously to provider    The patient has been notified of the following:      \"We have found that certain health care needs can be provided without the need for a face to face visit.  This service lets us provide the care you need with a phone conversation.       I will have full access to your [facility] medical record during this entire phone call.   I will be taking notes for your medical record.      Since this is like an office visit, we will bill your insurance company for this service.       There are potential benefits and risks of telephone visits (e.g. limits to patient confidentiality) that differ from in-person visits.?Confidentiality still applies for telephone services, and nobody will record the visit.  It is important to be in a quiet, private space that is free of distractions (including cell phone or other devices) during the visit.??      If during the course of the call I believe a telephone visit is not appropriate, you will not be charged for this service\"     Consent has been obtained for this service by care team member: Yes           DATA  Extended Session (53+ minutes): No  Interactive Complexity: No  Crisis: No        Progress Since Last Session (Related to Symptoms / Goals / Homework):   Symptoms: Frustration with challenges with her 's schedule changing    Homework: Progress made  Go to pool -not done  Paint the porch  Go through paperwork for taxes  Look into a couples therapist     In the future:  Get back to " budgeting     Episode of Care Goals: Satisfactory progress - ACTION (Actively working towards change); Intervened by reinforcing change plan / affirming steps taken     Current / Ongoing Stressors and Concerns:   Patient is currently socially isolated. She has a conflictual relationship with her .  She is getting minimal physical activity.  She had recent eye surgery and shoulder surgery.     Treatment Objective(s) Addressed in This Session:   Patient will increase frequency of engaging her in ADLs.  Patient will track and record at least 5 pleasant exchanges with . Patient will be able to identify at least 5 positive traits about her .  Patient will reduce level of depressive and anxious features as evidenced by reduction in score on her CHAVO-7 and PHQ-9 (scores of 15 and 16 at first measurment, respectively).     Intervention:   Supportive Therapy: Discussed her most recent doctor's appointment and her health concerns. Processed some of the time she spent with her  and how this went for her. Talked about learning of an acquaintance's death and how this impacted her. Discussed her reaction to feeling like she was being talked to like a child by a peer and how it is possible to some work around this.      The following assessments were completed by patient for this visit:  PHQ9: of note, patient reported she was in a hurry and didn't read all questions, she reports no suicidal ideation  PHQ-9 SCORE 5/16/2022 6/9/2022 6/14/2022 6/21/2022 6/24/2022 6/28/2022 6/30/2022   PHQ-9 Total Score MyChart 19 (Moderately severe depression) 18 (Moderately severe depression) 12 (Moderate depression) 16 (Moderately severe depression) 14 (Moderate depression) 14 (Moderate depression) 17 (Moderately severe depression)   PHQ-9 Total Score 19 18 12 16 14 14 17   Some encounter information is confidential and restricted. Go to Review Flowsheets activity to see all data.     GAD7:   CHAVO-7 SCORE 5/2/2022  5/2/2022 5/10/2022 5/10/2022 5/10/2022 6/9/2022 6/24/2022   Total Score - 16 (severe anxiety) - - 18 (severe anxiety) 14 (moderate anxiety) 16 (severe anxiety)   Total Score 16 16 18 18 18 14 16   Some encounter information is confidential and restricted. Go to Review Flowsheets activity to see all data.     PROMIS 10-Global Health (only subscores and total score):   PROMIS-10 Scores Only 12/14/2021 3/15/2022 3/15/2022 3/15/2022 3/24/2022 4/1/2022 6/9/2022   Global Mental Health Score 6 6 6 6 8 12 12   Global Physical Health Score 9 9 9 9 8 11 10   PROMIS TOTAL - SUBSCORES 15 15 15 15 16 23 22        ASSESSMENT: Current Emotional / Mental Status (status of significant symptoms):   Risk status (Self / Other harm or suicidal ideation)   Patient denies current fears or concerns for personal safety.   Patient denies current or recent suicidal ideation or behaviors.   Patient denies current or recent homicidal ideation or behaviors.   Patient denies current or recent self injurious behavior or ideation.   Patient denies other safety concerns.   Patient reports there has been no change in risk factors since their last session.     Patient reports there has been no change in protective factors since their last session.     A safety and risk management plan has been developed including: Patient has no change in safety concerns. Committed to safety and agreed to follow previously developed safety plan..  Patient consented to co-developed safety plan.  Safety and risk management plan was completed.  Patient agreed to use safety plan should any safety concerns arise.  A copy was given to the patient.     Appearance:   Appropriate    Eye Contact:   Good    Psychomotor Behavior: Normal    Attitude:   Cooperative    Orientation:   All   Speech    Rate / Production: Normal/ Responsive    Volume:  Normal    Mood:    Normal   Affect:    Appropriate    Thought Content:  Clear    Thought Form:  Coherent    Insight:    Good       Medication Review:   No changes to current psychiatric medication(s)     Medication Compliance:   Yes     Changes in Health Issues:   None reported     Chemical Use Review:   Substance Use: Chemical use reviewed, no active concerns identified Nothing used since 2021.     Tobacco Use: No current tobacco use.      Diagnosis:  1. Bipolar 2 disorder (H)    2. Generalized anxiety disorder    3. Trauma and stressor-related disorder      Collateral Reports Completed:   Not Applicable    PLAN: (Patient Tasks / Therapist Tasks / Other)  Go to pool  Paint the porch  Go through paperwork for taxes  Look into a couples therapist     In the future:  Get back to budgeting      There has been demonstrated improvement in functioning while patient has been engaged in psychotherapy/psychological service- if withdrawn the patient would deteriorate and/or relapse.     MICAELA SLADE   2022                                                        ______________________________________________________________________    Individual Treatment Plan    Patient's Name: Randi Cleary  YOB: 1955    Date of Creation: 20  Date Treatment Plan Last Reviewed/Revised: 22    DSM5 Diagnoses: 296.89 Bipolar II Disorder Depressed, 300.02 (F41.1) Generalized Anxiety Disorder or Adjustment Disorders  309.89 (F43.8) Other Specified Trauma and Stressor Related Disorder  Psychosocial / Contextual Factors: Patient's entire family of origin has , she now has a sister-in-law and  as support.  Relationship with  is conflictual. She is recovering from surgeries  PROMIS (reviewed every 90 days): PROMIS-10 Scores  PROMIS 10-Global Health (only subscores and total score):   PROMIS-10 Scores Only 2021 3/15/2022 3/15/2022 3/15/2022 3/24/2022 2022 2022   Global Mental Health Score 6 6 6 6 8 12 12   Global Physical Health Score 9 9 9 9 8 11 10   PROMIS TOTAL - SUBSCORES 15 15 15 15 16 23  "22       Referral / Collaboration:  Referral to another professional/service is not indicated at this time..    Anticipated number of session for this episode of care: 50  Anticipation frequency of session: Biweekly  Anticipated Duration of each session: 53 or more minutes due to intensity of trauma symptoms  Treatment plan will be reviewed in 90 days or when goals have been changed.   There has been demonstrated improvement in functioning while patient has been engaged in psychotherapy/psychological service- if withdrawn the patient would deteriorate and/or relapse.       MeasurableTreatment Goal(s) related to diagnosis / functional impairment(s)  Goal 1: Patient will \"jumpstart, getting going with the things I need to be doing around the house as far as picking up, doing things, trying to do something every day.  Also to lessen the animosity between me and my .\"    I will know I've met my goal when my shoulders are fixed and I can see.      Objective #A (Patient Action)    Patient will increase frequency of engaging her in ADLs.  Status: Continued - Date(s): 12/10/21, 3/9/22, 6/9/22    Intervention(s)  Therapist will engage patient in CBT, specifically behavioral activation.    Objective #B  Patient will  track and record at least 5 pleasant exchanges with . Patient will be able to identify at least 5 positive traits about her  and how he relates to her.  Status: Continued - Date(s): 12/10/21, 3/9/22, 6/9/22    Intervention(s)  Therapist will teach assertiveness skills and assign homework related to relationship interactions.    Objective #C  Patient will reduce level of depressive and anxious features as evidenced by reduction in score on her CHAVO-7 and PHQ-9 (scores of 15 and 16 at first measurment, respectively).  Status: Continued - Date(s): 12/10/21, 3/9/22, 6/9/22    Intervention(s)  Therapist will engage patient in person-centered therapy and CBT.    Patient has reviewed and agreed to " "the above plan.      CRUZ MICAELA BAKER JO  June 9, 2022                                                   Randi Cleary          SAFETY PLAN:  Step 1: Warning signs / cues (Thoughts, images, mood, situation, behavior) that a crisis may be developing:  ? Thoughts: \"I don't want to continue\" \"I am unwanted\"  ? Images: none  ? Thinking Processes: ruminating  ? Mood: anger  ? Behaviors: isolating/withdrawing , can't stop crying, not taking care of myself and not taking care of my responsibilities  ? Situations: small triggers, such as not being able to find something, or dropping something   Step 2: Coping strategies - Things I can do to take my mind off of my problems without contacting another person (relaxation technique, physical activity):  ? Distress Tolerance Strategies:  arts and crafts: drawing, play with my pet , listen to positive and upbeat music: any, change body temperature (ice pack/cold water)  and paced breathing/progressive muscle relaxation  ? Physical Activities: go for a walk, deep breathing and stretching   ? Focus on helpful thoughts:  \"You've been through this before, you can get through it again.\"  Step 3: People and social settings that provide distraction:                 Name: Carmen                            Name: D[place]en                           Name: Aleida       ? pool, shopping, Carmen's house, [employer]       Step 4: Remind myself of people and things that are important to me and worth living for:  Clifford Little Donna, post-COVID world, options of what could be in your future        Step 5: When I am in crisis, I can ask these people to help me use my safety plan:                 Name: Sidney  Step 6: Making the environment safe:   ? go to sleep/daydream  Step 7: Professionals or agencies I can contact during a crisis:  ? [facility] Daytime Number: [phone]  ? Suicide Prevention Lifeline: 4-980-960-HCGP (0504)  ? Crisis Text Line Service (available 24 hours a day, 7 days " a week): Text [place] to 884668    Local Crisis Services: [facility] Crisis: [phone]     Call 911 or go to my nearest emergency department.       I helped develop this safety plan and agree to use it when needed.  I have been given a copy of this plan.       Client signature _________________________________________________________________  Today s date:  11/24/2020  Adapted from Safety Plan Template 2008 Randi Poole and Robby Barba is reprinted with the express permission of the authors.  No portion of the Safety Plan Template may be reproduced without the express, written permission.  You can contact the authors at [email or web address] or [email or web address].

## 2022-07-14 ENCOUNTER — VIRTUAL VISIT (OUTPATIENT)
Dept: PSYCHOLOGY | Facility: CLINIC | Age: 67
End: 2022-07-14
Payer: MEDICARE

## 2022-07-14 DIAGNOSIS — F43.9 TRAUMA AND STRESSOR-RELATED DISORDER: ICD-10-CM

## 2022-07-14 DIAGNOSIS — F31.81 BIPOLAR 2 DISORDER (H): Primary | ICD-10-CM

## 2022-07-14 DIAGNOSIS — F41.1 GENERALIZED ANXIETY DISORDER: ICD-10-CM

## 2022-07-14 PROCEDURE — 90834 PSYTX W PT 45 MINUTES: CPT | Mod: 95 | Performed by: SOCIAL WORKER

## 2022-07-14 ASSESSMENT — ANXIETY QUESTIONNAIRES
7. FEELING AFRAID AS IF SOMETHING AWFUL MIGHT HAPPEN: SEVERAL DAYS
GAD7 TOTAL SCORE: 13
3. WORRYING TOO MUCH ABOUT DIFFERENT THINGS: SEVERAL DAYS
GAD7 TOTAL SCORE: 13
6. BECOMING EASILY ANNOYED OR IRRITABLE: NEARLY EVERY DAY
GAD7 TOTAL SCORE: 13
GAD7 TOTAL SCORE: 13
7. FEELING AFRAID AS IF SOMETHING AWFUL MIGHT HAPPEN: SEVERAL DAYS
7. FEELING AFRAID AS IF SOMETHING AWFUL MIGHT HAPPEN: SEVERAL DAYS
8. IF YOU CHECKED OFF ANY PROBLEMS, HOW DIFFICULT HAVE THESE MADE IT FOR YOU TO DO YOUR WORK, TAKE CARE OF THINGS AT HOME, OR GET ALONG WITH OTHER PEOPLE?: VERY DIFFICULT
6. BECOMING EASILY ANNOYED OR IRRITABLE: NEARLY EVERY DAY
3. WORRYING TOO MUCH ABOUT DIFFERENT THINGS: SEVERAL DAYS
2. NOT BEING ABLE TO STOP OR CONTROL WORRYING: SEVERAL DAYS
8. IF YOU CHECKED OFF ANY PROBLEMS, HOW DIFFICULT HAVE THESE MADE IT FOR YOU TO DO YOUR WORK, TAKE CARE OF THINGS AT HOME, OR GET ALONG WITH OTHER PEOPLE?: VERY DIFFICULT
5. BEING SO RESTLESS THAT IT IS HARD TO SIT STILL: MORE THAN HALF THE DAYS
2. NOT BEING ABLE TO STOP OR CONTROL WORRYING: SEVERAL DAYS
5. BEING SO RESTLESS THAT IT IS HARD TO SIT STILL: MORE THAN HALF THE DAYS
GAD7 TOTAL SCORE: 13
2. NOT BEING ABLE TO STOP OR CONTROL WORRYING: SEVERAL DAYS
7. FEELING AFRAID AS IF SOMETHING AWFUL MIGHT HAPPEN: SEVERAL DAYS
4. TROUBLE RELAXING: MORE THAN HALF THE DAYS
GAD7 TOTAL SCORE: 13
8. IF YOU CHECKED OFF ANY PROBLEMS, HOW DIFFICULT HAVE THESE MADE IT FOR YOU TO DO YOUR WORK, TAKE CARE OF THINGS AT HOME, OR GET ALONG WITH OTHER PEOPLE?: VERY DIFFICULT
6. BECOMING EASILY ANNOYED OR IRRITABLE: NEARLY EVERY DAY
4. TROUBLE RELAXING: MORE THAN HALF THE DAYS
3. WORRYING TOO MUCH ABOUT DIFFERENT THINGS: SEVERAL DAYS
GAD7 TOTAL SCORE: 13
1. FEELING NERVOUS, ANXIOUS, OR ON EDGE: NEARLY EVERY DAY
GAD7 TOTAL SCORE: 13
4. TROUBLE RELAXING: MORE THAN HALF THE DAYS
1. FEELING NERVOUS, ANXIOUS, OR ON EDGE: NEARLY EVERY DAY
5. BEING SO RESTLESS THAT IT IS HARD TO SIT STILL: MORE THAN HALF THE DAYS
1. FEELING NERVOUS, ANXIOUS, OR ON EDGE: NEARLY EVERY DAY
GAD7 TOTAL SCORE: 13

## 2022-07-14 NOTE — PROGRESS NOTES
"    [facility]                                     Progress Note    Patient Name: Randi Cleary  Date: 7/14/22         Service Type: Individual     Session Start Time: 10:31 AM  Session End Time: 11:20 AM     Session Length: 49 minutes    Session #: 120    Attendees: Client attended alone    Service Modality:  Phone Visit:      Provider verified identity through the following two step process.  Patient provided:  Patient is known previously to provider    The patient has been notified of the following:      \"We have found that certain health care needs can be provided without the need for a face to face visit.  This service lets us provide the care you need with a phone conversation.       I will have full access to your [facility] medical record during this entire phone call.   I will be taking notes for your medical record.      Since this is like an office visit, we will bill your insurance company for this service.       There are potential benefits and risks of telephone visits (e.g. limits to patient confidentiality) that differ from in-person visits.?Confidentiality still applies for telephone services, and nobody will record the visit.  It is important to be in a quiet, private space that is free of distractions (including cell phone or other devices) during the visit.??      If during the course of the call I believe a telephone visit is not appropriate, you will not be charged for this service\"     Consent has been obtained for this service by care team member: Yes           DATA  Extended Session (53+ minutes): No  Interactive Complexity: No  Crisis: No        Progress Since Last Session (Related to Symptoms / Goals / Homework):   Symptoms: Calmer than before, though feeling overwhelmed    Homework: Progress made  Go to pool  Paint the porch  Go through paperwork for taxes  Look into a couples therapist     In the future:  Get back to budgeting     Episode of Care Goals: " Satisfactory progress - ACTION (Actively working towards change); Intervened by reinforcing change plan / affirming steps taken     Current / Ongoing Stressors and Concerns:   Patient is currently socially isolated. She has a conflictual relationship with her .  She is getting minimal physical activity.  She had recent eye surgery and shoulder surgery.     Treatment Objective(s) Addressed in This Session:   Patient will increase frequency of engaging her in ADLs.  Patient will track and record at least 5 pleasant exchanges with . Patient will be able to identify at least 5 positive traits about her .  Patient will reduce level of depressive and anxious features as evidenced by reduction in score on her CHAVO-7 and PHQ-9 (scores of 15 and 16 at first measurment, respectively).     Intervention:   Supportive Therapy: Talked through how patient has been overwhelmed a lot recently. Looked at practical steps to slow things down for her, setting up a list of priorities for the day and how to accomplish them, plus still have time for herself. Helped patient plan out how much time her priorities would take and how to complete them.  Reviewed homework and identified successes and barriers to completion.        The following assessments were completed by patient for this visit:  PHQ9: of note, patient reported she was in a hurry and didn't read all questions, she reports no suicidal ideation  PHQ-9 SCORE 6/9/2022 6/14/2022 6/21/2022 6/24/2022 6/28/2022 6/30/2022 7/14/2022   PHQ-9 Total Score MyChart 18 (Moderately severe depression) 12 (Moderate depression) 16 (Moderately severe depression) 14 (Moderate depression) 14 (Moderate depression) 17 (Moderately severe depression) 15 (Moderately severe depression)   PHQ-9 Total Score 18 12 16 14 14 17 15   Some encounter information is confidential and restricted. Go to Review Flowsheets activity to see all data.     GAD7:   CHAVO-7 SCORE 5/10/2022 5/10/2022 6/9/2022  6/24/2022 7/14/2022 7/14/2022 7/14/2022   Total Score - 18 (severe anxiety) 14 (moderate anxiety) 16 (severe anxiety) - - 13 (moderate anxiety)   Total Score 18 18 14 16 13 13 13   Some encounter information is confidential and restricted. Go to Review Flowsheets activity to see all data.     PROMIS 10-Global Health (only subscores and total score):   PROMIS-10 Scores Only 12/14/2021 3/15/2022 3/15/2022 3/15/2022 3/24/2022 4/1/2022 6/9/2022   Global Mental Health Score 6 6 6 6 8 12 12   Global Physical Health Score 9 9 9 9 8 11 10   PROMIS TOTAL - SUBSCORES 15 15 15 15 16 23 22        ASSESSMENT: Current Emotional / Mental Status (status of significant symptoms):   Risk status (Self / Other harm or suicidal ideation)   Patient denies current fears or concerns for personal safety.   Patient denies current or recent suicidal ideation or behaviors.   Patient denies current or recent homicidal ideation or behaviors.   Patient denies current or recent self injurious behavior or ideation.   Patient denies other safety concerns.   Patient reports there has been no change in risk factors since their last session.     Patient reports there has been no change in protective factors since their last session.     A safety and risk management plan has been developed including: Patient has no change in safety concerns. Committed to safety and agreed to follow previously developed safety plan..  Patient consented to co-developed safety plan.  Safety and risk management plan was completed.  Patient agreed to use safety plan should any safety concerns arise.  A copy was given to the patient.     Appearance:   Appropriate    Eye Contact:   Good    Psychomotor Behavior: Normal    Attitude:   Cooperative    Orientation:   All   Speech    Rate / Production: Normal/ Responsive    Volume:  Normal    Mood:    Normal   Affect:    Appropriate    Thought Content:  Clear    Thought Form:  Coherent    Insight:    Good      Medication  Review:   No changes to current psychiatric medication(s)     Medication Compliance:   Yes     Changes in Health Issues:   None reported     Chemical Use Review:   Substance Use: Chemical use reviewed, no active concerns identified Nothing used since 2021.     Tobacco Use: No current tobacco use.      Diagnosis:  1. Bipolar 2 disorder (H)    2. Generalized anxiety disorder    3. Trauma and stressor-related disorder      Collateral Reports Completed:   Not Applicable    PLAN: (Patient Tasks / Therapist Tasks / Other)  Today:  Do laundry, shower, get bills together    There has been demonstrated improvement in functioning while patient has been engaged in psychotherapy/psychological service- if withdrawn the patient would deteriorate and/or relapse.     MICAELA SLADE   2022                                                        ______________________________________________________________________    Individual Treatment Plan    Patient's Name: Randi Cleary  YOB: 1955    Date of Creation: 20  Date Treatment Plan Last Reviewed/Revised: 22    DSM5 Diagnoses: 296.89 Bipolar II Disorder Depressed, 300.02 (F41.1) Generalized Anxiety Disorder or Adjustment Disorders  309.89 (F43.8) Other Specified Trauma and Stressor Related Disorder  Psychosocial / Contextual Factors: Patient's entire family of origin has , she now has a sister-in-law and  as support.  Relationship with  is conflictual. She is recovering from surgeries  PROMIS (reviewed every 90 days): PROMIS-10 Scores  PROMIS 10-Global Health (only subscores and total score):   PROMIS-10 Scores Only 2021 3/15/2022 3/15/2022 3/15/2022 3/24/2022 2022 2022   Global Mental Health Score 6 6 6 6 8 12 12   Global Physical Health Score 9 9 9 9 8 11 10   PROMIS TOTAL - SUBSCORES 15 15 15 15 16 23 22       Referral / Collaboration:  Referral to another professional/service is not indicated at this  "time..    Anticipated number of session for this episode of care: 50  Anticipation frequency of session: Biweekly  Anticipated Duration of each session: 53 or more minutes due to intensity of trauma symptoms  Treatment plan will be reviewed in 90 days or when goals have been changed.   There has been demonstrated improvement in functioning while patient has been engaged in psychotherapy/psychological service- if withdrawn the patient would deteriorate and/or relapse.       MeasurableTreatment Goal(s) related to diagnosis / functional impairment(s)  Goal 1: Patient will \"jumpstart, getting going with the things I need to be doing around the house as far as picking up, doing things, trying to do something every day.  Also to lessen the animosity between me and my .\"    I will know I've met my goal when my shoulders are fixed and I can see.      Objective #A (Patient Action)    Patient will increase frequency of engaging her in ADLs.  Status: Continued - Date(s): 12/10/21, 3/9/22, 6/9/22    Intervention(s)  Therapist will engage patient in CBT, specifically behavioral activation.    Objective #B  Patient will  track and record at least 5 pleasant exchanges with . Patient will be able to identify at least 5 positive traits about her  and how he relates to her.  Status: Continued - Date(s): 12/10/21, 3/9/22, 6/9/22    Intervention(s)  Therapist will teach assertiveness skills and assign homework related to relationship interactions.    Objective #C  Patient will reduce level of depressive and anxious features as evidenced by reduction in score on her CHAVO-7 and PHQ-9 (scores of 15 and 16 at first measurment, respectively).  Status: Continued - Date(s): 12/10/21, 3/9/22, 6/9/22    Intervention(s)  Therapist will engage patient in person-centered therapy and CBT.    Patient has reviewed and agreed to the above plan.      MICAELA SLADE  June 9, " "2022                                                   Randi Cleary          SAFETY PLAN:  Step 1: Warning signs / cues (Thoughts, images, mood, situation, behavior) that a crisis may be developing:  ? Thoughts: \"I don't want to continue\" \"I am unwanted\"  ? Images: none  ? Thinking Processes: ruminating  ? Mood: anger  ? Behaviors: isolating/withdrawing , can't stop crying, not taking care of myself and not taking care of my responsibilities  ? Situations: small triggers, such as not being able to find something, or dropping something   Step 2: Coping strategies - Things I can do to take my mind off of my problems without contacting another person (relaxation technique, physical activity):  ? Distress Tolerance Strategies:  arts and crafts: drawing, play with my pet , listen to positive and upbeat music: any, change body temperature (ice pack/cold water)  and paced breathing/progressive muscle relaxation  ? Physical Activities: go for a walk, deep breathing and stretching   ? Focus on helpful thoughts:  \"You've been through this before, you can get through it again.\"  Step 3: People and social settings that provide distraction:                 Name: Carmen                            Name: Darien                           Name: Aleida       ? pool, shopping, Carmen's house, [employer]       Step 4: Remind myself of people and things that are important to me and worth living for:  Clifford Little Donna, post-COVID world, options of what could be in your future        Step 5: When I am in crisis, I can ask these people to help me use my safety plan:                 Name: Sidney  Step 6: Making the environment safe:   ? go to sleep/daydream  Step 7: Professionals or agencies I can contact during a crisis:  ? [facility] Daytime Number: [phone]  ? Suicide Prevention Lifeline: 8-599-173-JHMR (0517)  ? Crisis Text Line Service (available 24 hours a day, 7 days a week): Text [place] to 282592    Local Crisis " Services: [facility] Crisis: [phone]     Call 911 or go to my nearest emergency department.       I helped develop this safety plan and agree to use it when needed.  I have been given a copy of this plan.       Client signature _________________________________________________________________  Today s date:  11/24/2020  Adapted from Safety Plan Template 2008 Randi Poole and Robby Barba is reprinted with the express permission of the authors.  No portion of the Safety Plan Template may be reproduced without the express, written permission.  You can contact the authors at [email or web address] or [email or web address].

## 2022-07-19 ENCOUNTER — VIRTUAL VISIT (OUTPATIENT)
Dept: PSYCHOLOGY | Facility: CLINIC | Age: 67
End: 2022-07-19
Payer: MEDICARE

## 2022-07-19 DIAGNOSIS — F43.9 TRAUMA AND STRESSOR-RELATED DISORDER: ICD-10-CM

## 2022-07-19 DIAGNOSIS — F41.1 GENERALIZED ANXIETY DISORDER: ICD-10-CM

## 2022-07-19 DIAGNOSIS — F31.81 BIPOLAR 2 DISORDER (H): Primary | ICD-10-CM

## 2022-07-19 PROCEDURE — 90834 PSYTX W PT 45 MINUTES: CPT | Mod: 95 | Performed by: SOCIAL WORKER

## 2022-07-19 NOTE — PROGRESS NOTES
"    [facility]                                     Progress Note    Patient Name: Randi Cleary  Date: 7/19/22         Service Type: Individual     Session Start Time: 8:33 AM  Session End Time: 9:22 AM     Session Length: 49 minutes    Session #: 121    Attendees: Client attended alone    Service Modality:  Phone Visit:      Provider verified identity through the following two step process.  Patient provided:  Patient is known previously to provider    The patient has been notified of the following:      \"We have found that certain health care needs can be provided without the need for a face to face visit.  This service lets us provide the care you need with a phone conversation.       I will have full access to your [facility] medical record during this entire phone call.   I will be taking notes for your medical record.      Since this is like an office visit, we will bill your insurance company for this service.       There are potential benefits and risks of telephone visits (e.g. limits to patient confidentiality) that differ from in-person visits.?Confidentiality still applies for telephone services, and nobody will record the visit.  It is important to be in a quiet, private space that is free of distractions (including cell phone or other devices) during the visit.??      If during the course of the call I believe a telephone visit is not appropriate, you will not be charged for this service\"     Consent has been obtained for this service by care team member: Yes           DATA  Extended Session (53+ minutes): No  Interactive Complexity: No  Crisis: No        Progress Since Last Session (Related to Symptoms / Goals / Homework):   Symptoms: Calmer than before, though feeling overwhelmed    Homework: Progress made  Do laundry, shower, get bills together -laundry done, bills done, shower didn't happen     Episode of Care Goals: Satisfactory progress - ACTION (Actively working " towards change); Intervened by reinforcing change plan / affirming steps taken     Current / Ongoing Stressors and Concerns:   Patient is currently socially isolated. She has a conflictual relationship with her .  She is getting minimal physical activity.  She had recent eye surgery and shoulder surgery.     Treatment Objective(s) Addressed in This Session:   Patient will increase frequency of engaging her in ADLs.  Patient will track and record at least 5 pleasant exchanges with . Patient will be able to identify at least 5 positive traits about her .  Patient will reduce level of depressive and anxious features as evidenced by reduction in score on her CHAVO-7 and PHQ-9 (scores of 15 and 16 at first measurment, respectively).     Intervention:   Supportive Therapy: Processed patient's week, including attending a farmers market and supporting her . Talked through financial constrains and options. Looked at how she managed to complete the three tasks from the previous week and how hard this was for her. Talked about the guilt that keeps plaguing her and how to cope with this guilt, agreeing to focus on this further at the next session.    The following assessments were completed by patient for this visit:  PHQ9: of note, patient reported she was in a hurry and didn't read all questions, she reports no suicidal ideation  PHQ-9 SCORE 6/9/2022 6/14/2022 6/21/2022 6/24/2022 6/28/2022 6/30/2022 7/14/2022   PHQ-9 Total Score MyChart 18 (Moderately severe depression) 12 (Moderate depression) 16 (Moderately severe depression) 14 (Moderate depression) 14 (Moderate depression) 17 (Moderately severe depression) 15 (Moderately severe depression)   PHQ-9 Total Score 18 12 16 14 14 17 15   Some encounter information is confidential and restricted. Go to Review Flowsheets activity to see all data.     GAD7:   CHAVO-7 SCORE 5/10/2022 5/10/2022 6/9/2022 6/24/2022 7/14/2022 7/14/2022 7/14/2022   Total Score -  18 (severe anxiety) 14 (moderate anxiety) 16 (severe anxiety) - - 13 (moderate anxiety)   Total Score 18 18 14 16 13 13 13   Some encounter information is confidential and restricted. Go to Review Flowsheets activity to see all data.     PROMIS 10-Global Health (only subscores and total score):   PROMIS-10 Scores Only 12/14/2021 3/15/2022 3/15/2022 3/15/2022 3/24/2022 4/1/2022 6/9/2022   Global Mental Health Score 6 6 6 6 8 12 12   Global Physical Health Score 9 9 9 9 8 11 10   PROMIS TOTAL - SUBSCORES 15 15 15 15 16 23 22        ASSESSMENT: Current Emotional / Mental Status (status of significant symptoms):   Risk status (Self / Other harm or suicidal ideation)   Patient denies current fears or concerns for personal safety.   Patient denies current or recent suicidal ideation or behaviors.   Patient denies current or recent homicidal ideation or behaviors.   Patient denies current or recent self injurious behavior or ideation.   Patient denies other safety concerns.   Patient reports there has been no change in risk factors since their last session.     Patient reports there has been no change in protective factors since their last session.     A safety and risk management plan has been developed including: Patient has no change in safety concerns. Committed to safety and agreed to follow previously developed safety plan..  Patient consented to co-developed safety plan.  Safety and risk management plan was completed.  Patient agreed to use safety plan should any safety concerns arise.  A copy was given to the patient.     Appearance:   Appropriate    Eye Contact:   Good    Psychomotor Behavior: Normal    Attitude:   Cooperative    Orientation:   All   Speech    Rate / Production: Normal/ Responsive    Volume:  Normal    Mood:    Normal   Affect:    Appropriate    Thought Content:  Clear    Thought Form:  Coherent    Insight:    Good      Medication Review:   No changes to current psychiatric  medication(s)     Medication Compliance:   Yes     Changes in Health Issues:   None reported     Chemical Use Review:   Substance Use: Chemical use reviewed, no active concerns identified Nothing used since 2021.     Tobacco Use: No current tobacco use.      Diagnosis:  1. Bipolar 2 disorder (H)    2. Generalized anxiety disorder    3. Trauma and stressor-related disorder      Collateral Reports Completed:   Not Applicable    PLAN: (Patient Tasks / Therapist Tasks / Other)  Get to the pool  Next session explore guilt.    There has been demonstrated improvement in functioning while patient has been engaged in psychotherapy/psychological service- if withdrawn the patient would deteriorate and/or relapse.     MICAELA SLADE   2022                                                        ______________________________________________________________________    Individual Treatment Plan    Patient's Name: Randi Cleary  YOB: 1955    Date of Creation: 20  Date Treatment Plan Last Reviewed/Revised: 22    DSM5 Diagnoses: 296.89 Bipolar II Disorder Depressed, 300.02 (F41.1) Generalized Anxiety Disorder or Adjustment Disorders  309.89 (F43.8) Other Specified Trauma and Stressor Related Disorder  Psychosocial / Contextual Factors: Patient's entire family of origin has , she now has a sister-in-law and  as support.  Relationship with  is conflictual. She is recovering from surgeries  PROMIS (reviewed every 90 days): PROMIS-10 Scores  PROMIS 10-Global Health (only subscores and total score):   PROMIS-10 Scores Only 2021 3/15/2022 3/15/2022 3/15/2022 3/24/2022 2022 2022   Global Mental Health Score 6 6 6 6 8 12 12   Global Physical Health Score 9 9 9 9 8 11 10   PROMIS TOTAL - SUBSCORES 15 15 15 15 16 23 22       Referral / Collaboration:  Referral to another professional/service is not indicated at this time..    Anticipated number of session for this  "episode of care: 50  Anticipation frequency of session: Biweekly  Anticipated Duration of each session: 53 or more minutes due to intensity of trauma symptoms  Treatment plan will be reviewed in 90 days or when goals have been changed.   There has been demonstrated improvement in functioning while patient has been engaged in psychotherapy/psychological service- if withdrawn the patient would deteriorate and/or relapse.       MeasurableTreatment Goal(s) related to diagnosis / functional impairment(s)  Goal 1: Patient will \"jumpstart, getting going with the things I need to be doing around the house as far as picking up, doing things, trying to do something every day.  Also to lessen the animosity between me and my .\"    I will know I've met my goal when my shoulders are fixed and I can see.      Objective #A (Patient Action)    Patient will increase frequency of engaging her in ADLs.  Status: Continued - Date(s): 12/10/21, 3/9/22, 6/9/22    Intervention(s)  Therapist will engage patient in CBT, specifically behavioral activation.    Objective #B  Patient will  track and record at least 5 pleasant exchanges with . Patient will be able to identify at least 5 positive traits about her  and how he relates to her.  Status: Continued - Date(s): 12/10/21, 3/9/22, 6/9/22    Intervention(s)  Therapist will teach assertiveness skills and assign homework related to relationship interactions.    Objective #C  Patient will reduce level of depressive and anxious features as evidenced by reduction in score on her CHAVO-7 and PHQ-9 (scores of 15 and 16 at first measurment, respectively).  Status: Continued - Date(s): 12/10/21, 3/9/22, 6/9/22    Intervention(s)  Therapist will engage patient in person-centered therapy and CBT.    Patient has reviewed and agreed to the above plan.      MICAELA SLADE  June 9, 2022                                                   Randi Cleary          SAFETY PLAN:  Step 1: " "Warning signs / cues (Thoughts, images, mood, situation, behavior) that a crisis may be developing:  ? Thoughts: \"I don't want to continue\" \"I am unwanted\"  ? Images: none  ? Thinking Processes: ruminating  ? Mood: anger  ? Behaviors: isolating/withdrawing , can't stop crying, not taking care of myself and not taking care of my responsibilities  ? Situations: small triggers, such as not being able to find something, or dropping something   Step 2: Coping strategies - Things I can do to take my mind off of my problems without contacting another person (relaxation technique, physical activity):  ? Distress Tolerance Strategies:  arts and crafts: drawing, play with my pet , listen to positive and upbeat music: any, change body temperature (ice pack/cold water)  and paced breathing/progressive muscle relaxation  ? Physical Activities: go for a walk, deep breathing and stretching   ? Focus on helpful thoughts:  \"You've been through this before, you can get through it again.\"  Step 3: People and social settings that provide distraction:                 Name: Carmen                            Name: Darien                           Name: Aleida       ? pool, shopping, Carmen's house, [employer]       Step 4: Remind myself of people and things that are important to me and worth living for:  Clifford Little Donna, post-COVID world, options of what could be in your future        Step 5: When I am in crisis, I can ask these people to help me use my safety plan:                 Name: Sidney  Step 6: Making the environment safe:   ? go to sleep/daydream  Step 7: Professionals or agencies I can contact during a crisis:  ? [facility] Daytime Number: [phone]  ? Suicide Prevention Lifeline: 8-093-383-TALK (7728)  ? Crisis Text Line Service (available 24 hours a day, 7 days a week): Text [place] to 292034    Local Crisis Services: [facility] Crisis: [phone]     Call 911 or go to my nearest emergency " department.       I helped develop this safety plan and agree to use it when needed.  I have been given a copy of this plan.       Client signature _________________________________________________________________  Today s date:  11/24/2020  Adapted from Safety Plan Template 2008 Randi Poole and Robby Barba is reprinted with the express permission of the authors.  No portion of the Safety Plan Template may be reproduced without the express, written permission.  You can contact the authors at [email or web address] or [email or web address].

## 2022-07-21 ENCOUNTER — VIRTUAL VISIT (OUTPATIENT)
Dept: PSYCHOLOGY | Facility: CLINIC | Age: 67
End: 2022-07-21
Payer: MEDICARE

## 2022-07-21 DIAGNOSIS — F43.9 TRAUMA AND STRESSOR-RELATED DISORDER: ICD-10-CM

## 2022-07-21 DIAGNOSIS — F31.81 BIPOLAR 2 DISORDER (H): Primary | ICD-10-CM

## 2022-07-21 DIAGNOSIS — F41.1 GENERALIZED ANXIETY DISORDER: ICD-10-CM

## 2022-07-21 PROCEDURE — 90834 PSYTX W PT 45 MINUTES: CPT | Mod: 95 | Performed by: SOCIAL WORKER

## 2022-07-21 NOTE — PROGRESS NOTES
[facility]                                     Progress Note    Patient Name: Randi Cleary  Date: 7/21/22         Service Type: Individual     Session Start Time: 10:30 AM  Session End Time: 11:19 AM     Session Length: 49 minutes    Session #: 122    Attendees: Client attended alone    Service Modality:  Video Visit:      Provider verified identity through the following two step process.  Patient provided:  Patient is known previously to provider    Telemedicine Visit: The patient's condition can be safely assessed and treated via synchronous audio and visual telemedicine encounter.      Reason for Telemedicine Visit: Patient convenience (e.g. access to timely appointments / distance to available provider)    Originating Site (Patient Location): Patient's home    Distant Site (Provider Location): Provider Remote Setting- Home Office    Consent:  The patient/guardian has verbally consented to: the potential risks and benefits of telemedicine (video visit) versus in person care; bill my insurance or make self-payment for services provided; and responsibility for payment of non-covered services.     Patient would like the video invitation sent by:  My Chart    Mode of Communication:  Video Conference via [facility]well    As the provider I attest to compliance with applicable laws and regulations related to telemedicine.          DATA  Extended Session (53+ minutes): No  Interactive Complexity: No  Crisis: No        Progress Since Last Session (Related to Symptoms / Goals / Homework):   Symptoms: No significant change since prior session    Homework: Progress made  Get to the pool   Next session explore guilt.     Episode of Care Goals: Satisfactory progress - ACTION (Actively working towards change); Intervened by reinforcing change plan / affirming steps taken     Current / Ongoing Stressors and Concerns:   Patient is currently socially isolated. She has a conflictual relationship with her  .  She is getting minimal physical activity.  She had recent eye surgery and shoulder surgery.     Treatment Objective(s) Addressed in This Session:   Patient will increase frequency of engaging her in ADLs.  Patient will track and record at least 5 pleasant exchanges with . Patient will be able to identify at least 5 positive traits about her .  Patient will reduce level of depressive and anxious features as evidenced by reduction in score on her CHAVO-7 and PHQ-9 (scores of 15 and 16 at first measurment, respectively).     Intervention:   Supportive Therapy: Processed change in relationship with friend and what she is gaining from this change. Talked about relationship with  and how to improve this. Looked at exploring guilt, but patient reported she wasn't up for this. Talked through self care in relationships.     The following assessments were completed by patient for this visit:  PHQ9: of note, patient reported she was in a hurry and didn't read all questions, she reports no suicidal ideation  PHQ-9 SCORE 6/9/2022 6/14/2022 6/21/2022 6/24/2022 6/28/2022 6/30/2022 7/14/2022   PHQ-9 Total Score MyChart 18 (Moderately severe depression) 12 (Moderate depression) 16 (Moderately severe depression) 14 (Moderate depression) 14 (Moderate depression) 17 (Moderately severe depression) 15 (Moderately severe depression)   PHQ-9 Total Score 18 12 16 14 14 17 15   Some encounter information is confidential and restricted. Go to Review Flowsheets activity to see all data.     GAD7:   CHAVO-7 SCORE 5/10/2022 5/10/2022 6/9/2022 6/24/2022 7/14/2022 7/14/2022 7/14/2022   Total Score - 18 (severe anxiety) 14 (moderate anxiety) 16 (severe anxiety) - - 13 (moderate anxiety)   Total Score 18 18 14 16 13 13 13   Some encounter information is confidential and restricted. Go to Review Flowsheets activity to see all data.     PROMIS 10-Global Health (only subscores and total score):   PROMIS-10 Scores Only  12/14/2021 3/15/2022 3/15/2022 3/15/2022 3/24/2022 4/1/2022 6/9/2022   Global Mental Health Score 6 6 6 6 8 12 12   Global Physical Health Score 9 9 9 9 8 11 10   PROMIS TOTAL - SUBSCORES 15 15 15 15 16 23 22        ASSESSMENT: Current Emotional / Mental Status (status of significant symptoms):   Risk status (Self / Other harm or suicidal ideation)   Patient denies current fears or concerns for personal safety.   Patient denies current or recent suicidal ideation or behaviors.   Patient denies current or recent homicidal ideation or behaviors.   Patient denies current or recent self injurious behavior or ideation.   Patient denies other safety concerns.   Patient reports there has been no change in risk factors since their last session.     Patient reports there has been no change in protective factors since their last session.     A safety and risk management plan has been developed including: Patient has no change in safety concerns. Committed to safety and agreed to follow previously developed safety plan..  Patient consented to co-developed safety plan.  Safety and risk management plan was completed.  Patient agreed to use safety plan should any safety concerns arise.  A copy was given to the patient.     Appearance:   Appropriate    Eye Contact:   Good    Psychomotor Behavior: Normal    Attitude:   Cooperative    Orientation:   All   Speech    Rate / Production: Normal/ Responsive    Volume:  Normal    Mood:    Normal   Affect:    Appropriate    Thought Content:  Clear    Thought Form:  Coherent    Insight:    Good      Medication Review:   No changes to current psychiatric medication(s)     Medication Compliance:   Yes     Changes in Health Issues:   None reported     Chemical Use Review:   Substance Use: Chemical use reviewed, no active concerns identified Nothing used since August 2021.     Tobacco Use: No current tobacco use.      Diagnosis:  1. Bipolar 2 disorder (H)    2. Generalized anxiety disorder     3. Trauma and stressor-related disorder      Collateral Reports Completed:   Not Applicable    PLAN: (Patient Tasks / Therapist Tasks / Other)  Get to the pool    There has been demonstrated improvement in functioning while patient has been engaged in psychotherapy/psychological service- if withdrawn the patient would deteriorate and/or relapse.     CRUZ MICAELA BAKER JO   2022                                                        ______________________________________________________________________    Individual Treatment Plan    Patient's Name: Randi Cleary  YOB: 1955    Date of Creation: 20  Date Treatment Plan Last Reviewed/Revised: 22    DSM5 Diagnoses: 296.89 Bipolar II Disorder Depressed, 300.02 (F41.1) Generalized Anxiety Disorder or Adjustment Disorders  309.89 (F43.8) Other Specified Trauma and Stressor Related Disorder  Psychosocial / Contextual Factors: Patient's entire family of origin has , she now has a sister-in-law and  as support.  Relationship with  is conflictual. She is recovering from surgeries  PROMIS (reviewed every 90 days): PROMIS-10 Scores  PROMIS 10-Global Health (only subscores and total score):   PROMIS-10 Scores Only 2021 3/15/2022 3/15/2022 3/15/2022 3/24/2022 2022 2022   Global Mental Health Score 6 6 6 6 8 12 12   Global Physical Health Score 9 9 9 9 8 11 10   PROMIS TOTAL - SUBSCORES 15 15 15 15 16 23 22       Referral / Collaboration:  Referral to another professional/service is not indicated at this time..    Anticipated number of session for this episode of care: 50  Anticipation frequency of session: Biweekly  Anticipated Duration of each session: 53 or more minutes due to intensity of trauma symptoms  Treatment plan will be reviewed in 90 days or when goals have been changed.   There has been demonstrated improvement in functioning while patient has been engaged in psychotherapy/psychological service- if  "withdrawn the patient would deteriorate and/or relapse.       MeasurableTreatment Goal(s) related to diagnosis / functional impairment(s)  Goal 1: Patient will \"jumpstart, getting going with the things I need to be doing around the house as far as picking up, doing things, trying to do something every day.  Also to lessen the animosity between me and my .\"    I will know I've met my goal when my shoulders are fixed and I can see.      Objective #A (Patient Action)    Patient will increase frequency of engaging her in ADLs.  Status: Continued - Date(s): 12/10/21, 3/9/22, 6/9/22    Intervention(s)  Therapist will engage patient in CBT, specifically behavioral activation.    Objective #B  Patient will  track and record at least 5 pleasant exchanges with . Patient will be able to identify at least 5 positive traits about her  and how he relates to her.  Status: Continued - Date(s): 12/10/21, 3/9/22, 6/9/22    Intervention(s)  Therapist will teach assertiveness skills and assign homework related to relationship interactions.    Objective #C  Patient will reduce level of depressive and anxious features as evidenced by reduction in score on her CHAVO-7 and PHQ-9 (scores of 15 and 16 at first measurment, respectively).  Status: Continued - Date(s): 12/10/21, 3/9/22, 6/9/22    Intervention(s)  Therapist will engage patient in person-centered therapy and CBT.    Patient has reviewed and agreed to the above plan.      MICAELA SLADE  June 9, 2022                                                   Randi Cleary          SAFETY PLAN:  Step 1: Warning signs / cues (Thoughts, images, mood, situation, behavior) that a crisis may be developing:  ? Thoughts: \"I don't want to continue\" \"I am unwanted\"  ? Images: none  ? Thinking Processes: ruminating  ? Mood: anger  ? Behaviors: isolating/withdrawing , can't stop crying, not taking care of myself and not taking care of my responsibilities  ? Situations: small " "triggers, such as not being able to find something, or dropping something   Step 2: Coping strategies - Things I can do to take my mind off of my problems without contacting another person (relaxation technique, physical activity):  ? Distress Tolerance Strategies:  arts and crafts: drawing, play with my pet , listen to positive and upbeat music: any, change body temperature (ice pack/cold water)  and paced breathing/progressive muscle relaxation  ? Physical Activities: go for a walk, deep breathing and stretching   ? Focus on helpful thoughts:  \"You've been through this before, you can get through it again.\"  Step 3: People and social settings that provide distraction:                 Name: Carmen                            Name: Darien                           Name: Aleida       ? pool, shopping, Carmen's house, [employer]       Step 4: Remind myself of people and things that are important to me and worth living for:  Clifford Little Donna, post-COVID world, options of what could be in your future        Step 5: When I am in crisis, I can ask these people to help me use my safety plan:                 Name: Sidney  Step 6: Making the environment safe:   ? go to sleep/daydream  Step 7: Professionals or agencies I can contact during a crisis:  ? [facility] Daytime Number: [phone]  ? Suicide Prevention Lifeline: 5-650-188-YPZS (6030)  ? Crisis Text Line Service (available 24 hours a day, 7 days a week): Text [place] to 188689    Local Crisis Services: [facility] Crisis: [phone]     Call 911 or go to my nearest emergency department.       I helped develop this safety plan and agree to use it when needed.  I have been given a copy of this plan.       Client signature _________________________________________________________________  Today s date:  11/24/2020  Adapted from Safety Plan Template 2008 Randi Poole and Robby Barba is reprinted with the express permission of the authors.  No " portion of the Safety Plan Template may be reproduced without the express, written permission.  You can contact the authors at [email or web address] or [email or web address].

## 2022-07-22 ENCOUNTER — TELEPHONE (OUTPATIENT)
Dept: INTERNAL MEDICINE | Facility: CLINIC | Age: 67
End: 2022-07-22

## 2022-07-22 NOTE — TELEPHONE ENCOUNTER
Reason for Call:  Other Covid Booster    Detailed comments: Patient would like to get Covid Booster.  At her last appointment PCP decided to hold off as patient reports she had many respiratory issues.    Patient has scheduled follow up with PCP for end of August.    She would like to move forward and get covid booster.  Would PCP approve at this time?  She is not on the respiratory medications she was on in June at this time?  If yes, where would PCP recommend she go?  She would like to be vaccinated sooner that her 8/31/22 appointment.    Phone Number Patient can be reached at: Cell number on file:    Telephone Information:   Mobile [phone]       Best Time: Any    Can we leave a detailed message on this number? NO, if patient is not able to answer, please send Help/Systems message.    Call taken on 7/22/2022 at 9:34 AM by Pamela Juarez

## 2022-07-25 ENCOUNTER — IMMUNIZATION (OUTPATIENT)
Dept: NURSING | Facility: CLINIC | Age: 67
End: 2022-07-25
Payer: MEDICARE

## 2022-07-25 PROCEDURE — 0064A COVID-19,PF,MODERNA (18+ YRS BOOSTER .25ML): CPT

## 2022-07-25 PROCEDURE — 91306 COVID-19,PF,MODERNA (18+ YRS BOOSTER .25ML): CPT

## 2022-07-26 ENCOUNTER — VIRTUAL VISIT (OUTPATIENT)
Dept: PSYCHOLOGY | Facility: CLINIC | Age: 67
End: 2022-07-26
Payer: MEDICARE

## 2022-07-26 DIAGNOSIS — F31.81 BIPOLAR 2 DISORDER (H): Primary | ICD-10-CM

## 2022-07-26 DIAGNOSIS — F41.1 GENERALIZED ANXIETY DISORDER: ICD-10-CM

## 2022-07-26 DIAGNOSIS — F43.9 TRAUMA AND STRESSOR-RELATED DISORDER: ICD-10-CM

## 2022-07-26 PROCEDURE — 90837 PSYTX W PT 60 MINUTES: CPT | Mod: 95 | Performed by: SOCIAL WORKER

## 2022-07-26 NOTE — PROGRESS NOTES
[facility]                                     Progress Note    Patient Name: Randi Cleary  Date: 7/26/22         Service Type: Individual     Session Start Time: 3:00 PM  Session End Time: 3:54 PM     Session Length: 54 minutes    Session #: 123    Attendees: Client attended alone    Service Modality:  Video Visit:      Provider verified identity through the following two step process.  Patient provided:  Patient is known previously to provider    Telemedicine Visit: The patient's condition can be safely assessed and treated via synchronous audio and visual telemedicine encounter.      Reason for Telemedicine Visit: Patient convenience (e.g. access to timely appointments / distance to available provider)    Originating Site (Patient Location): Patient's home    Distant Site (Provider Location): Provider Remote Setting- Home Office    Consent:  The patient/guardian has verbally consented to: the potential risks and benefits of telemedicine (video visit) versus in person care; bill my insurance or make self-payment for services provided; and responsibility for payment of non-covered services.     Patient would like the video invitation sent by:  My Chart    Mode of Communication:  Video Conference via Amwell    As the provider I attest to compliance with applicable laws and regulations related to telemedicine.          DATA  Extended Session (53+ minutes): No  Interactive Complexity: No  Crisis: No        Progress Since Last Session (Related to Symptoms / Goals / Homework):   Symptoms: No significant change since prior session    Homework: Progress made  Get to the pool     Episode of Care Goals: Satisfactory progress - ACTION (Actively working towards change); Intervened by reinforcing change plan / affirming steps taken     Current / Ongoing Stressors and Concerns:   Patient is currently socially isolated. She has a conflictual relationship with her .  She is getting minimal  physical activity.  She had recent eye surgery and shoulder surgery.     Treatment Objective(s) Addressed in This Session:   Patient will increase frequency of engaging her in ADLs.  Patient will track and record at least 5 pleasant exchanges with . Patient will be able to identify at least 5 positive traits about her .  Patient will reduce level of depressive and anxious features as evidenced by reduction in score on her CHAVO-7 and PHQ-9 (scores of 15 and 16 at first measurment, respectively).     Intervention:   Supportive Therapy: Talked about changes within the home. Looked at how she's managing with all that is happening in her life. Looked at ways she feels overwhelmed and next steps for her. Identified way to care for herself.      The following assessments were completed by patient for this visit:  PHQ9: of note, patient reported she was in a hurry and didn't read all questions, she reports no suicidal ideation  PHQ-9 SCORE 6/14/2022 6/21/2022 6/24/2022 6/28/2022 6/30/2022 7/14/2022 7/26/2022   PHQ-9 Total Score MyChart 12 (Moderate depression) 16 (Moderately severe depression) 14 (Moderate depression) 14 (Moderate depression) 17 (Moderately severe depression) 15 (Moderately severe depression) 13 (Moderate depression)   PHQ-9 Total Score 12 16 14 14 17 15 13   Some encounter information is confidential and restricted. Go to Review [employer] activity to see all data.     GAD7:   CHAVO-7 SCORE 5/10/2022 5/10/2022 6/9/2022 6/24/2022 7/14/2022 7/14/2022 7/14/2022   Total Score - 18 (severe anxiety) 14 (moderate anxiety) 16 (severe anxiety) - - 13 (moderate anxiety)   Total Score 18 18 14 16 13 13 13   Some encounter information is confidential and restricted. Go to Review Flowsheets activity to see all data.     PROMIS 10-Global Health (only subscores and total score):   PROMIS-10 Scores Only 12/14/2021 3/15/2022 3/15/2022 3/15/2022 3/24/2022 4/1/2022 6/9/2022   Global Mental Health Score 6 6 6 6 8  12 12   Global Physical Health Score 9 9 9 9 8 11 10   PROMIS TOTAL - SUBSCORES 15 15 15 15 16 23 22        ASSESSMENT: Current Emotional / Mental Status (status of significant symptoms):   Risk status (Self / Other harm or suicidal ideation)   Patient denies current fears or concerns for personal safety.   Patient denies current or recent suicidal ideation or behaviors.   Patient denies current or recent homicidal ideation or behaviors.   Patient denies current or recent self injurious behavior or ideation.   Patient denies other safety concerns.   Patient reports there has been no change in risk factors since their last session.     Patient reports there has been no change in protective factors since their last session.     A safety and risk management plan has been developed including: Patient has no change in safety concerns. Committed to safety and agreed to follow previously developed safety plan..  Patient consented to co-developed safety plan.  Safety and risk management plan was completed.  Patient agreed to use safety plan should any safety concerns arise.  A copy was given to the patient.     Appearance:   Appropriate    Eye Contact:   Good    Psychomotor Behavior: Normal    Attitude:   Cooperative    Orientation:   All   Speech    Rate / Production: Normal/ Responsive    Volume:  Normal    Mood:    Normal   Affect:    Appropriate    Thought Content:  Clear    Thought Form:  Coherent    Insight:    Good      Medication Review:   No changes to current psychiatric medication(s)     Medication Compliance:   Yes     Changes in Health Issues:   None reported     Chemical Use Review:   Substance Use: Chemical use reviewed, no active concerns identified Nothing used since August 2021.     Tobacco Use: No current tobacco use.      Diagnosis:  1. Bipolar 2 disorder (H)    2. Generalized anxiety disorder    3. Trauma and stressor-related disorder      Collateral Reports Completed:   Not Applicable    PLAN: (Patient  Tasks / Therapist Tasks / Other)  Get to the pool    There has been demonstrated improvement in functioning while patient has been engaged in psychotherapy/psychological service- if withdrawn the patient would deteriorate and/or relapse.     CRUZ MICAELA BAKER JO   2022                                                        ______________________________________________________________________    Individual Treatment Plan    Patient's Name: Randi Cleary  YOB: 1955    Date of Creation: 20  Date Treatment Plan Last Reviewed/Revised: 22    DSM5 Diagnoses: 296.89 Bipolar II Disorder Depressed, 300.02 (F41.1) Generalized Anxiety Disorder or Adjustment Disorders  309.89 (F43.8) Other Specified Trauma and Stressor Related Disorder  Psychosocial / Contextual Factors: Patient's entire family of origin has , she now has a sister-in-law and  as support.  Relationship with  is conflictual. She is recovering from surgeries  PROMIS (reviewed every 90 days): PROMIS-10 Scores  PROMIS 10-Global Health (only subscores and total score):   PROMIS-10 Scores Only 2021 3/15/2022 3/15/2022 3/15/2022 3/24/2022 2022 2022   Global Mental Health Score 6 6 6 6 8 12 12   Global Physical Health Score 9 9 9 9 8 11 10   PROMIS TOTAL - SUBSCORES 15 15 15 15 16 23 22       Referral / Collaboration:  Referral to another professional/service is not indicated at this time..    Anticipated number of session for this episode of care: 50  Anticipation frequency of session: Biweekly  Anticipated Duration of each session: 53 or more minutes due to intensity of trauma symptoms  Treatment plan will be reviewed in 90 days or when goals have been changed.   There has been demonstrated improvement in functioning while patient has been engaged in psychotherapy/psychological service- if withdrawn the patient would deteriorate and/or relapse.       MeasurableTreatment Goal(s) related to diagnosis /  "functional impairment(s)  Goal 1: Patient will \"jumpstart, getting going with the things I need to be doing around the house as far as picking up, doing things, trying to do something every day.  Also to lessen the animosity between me and my .\"    I will know I've met my goal when my shoulders are fixed and I can see.      Objective #A (Patient Action)    Patient will increase frequency of engaging her in ADLs.  Status: Continued - Date(s): 12/10/21, 3/9/22, 6/9/22    Intervention(s)  Therapist will engage patient in CBT, specifically behavioral activation.    Objective #B  Patient will  track and record at least 5 pleasant exchanges with . Patient will be able to identify at least 5 positive traits about her  and how he relates to her.  Status: Continued - Date(s): 12/10/21, 3/9/22, 6/9/22    Intervention(s)  Therapist will teach assertiveness skills and assign homework related to relationship interactions.    Objective #C  Patient will reduce level of depressive and anxious features as evidenced by reduction in score on her CHAVO-7 and PHQ-9 (scores of 15 and 16 at first measurment, respectively).  Status: Continued - Date(s): 12/10/21, 3/9/22, 6/9/22    Intervention(s)  Therapist will engage patient in person-centered therapy and CBT.    Patient has reviewed and agreed to the above plan.      MICAELA SLADE  June 9, 2022                                                   Randi Cleary          SAFETY PLAN:  Step 1: Warning signs / cues (Thoughts, images, mood, situation, behavior) that a crisis may be developing:  ? Thoughts: \"I don't want to continue\" \"I am unwanted\"  ? Images: none  ? Thinking Processes: ruminating  ? Mood: anger  ? Behaviors: isolating/withdrawing , can't stop crying, not taking care of myself and not taking care of my responsibilities  ? Situations: small triggers, such as not being able to find something, or dropping something   Step 2: Coping strategies - Things I " "can do to take my mind off of my problems without contacting another person (relaxation technique, physical activity):  ? Distress Tolerance Strategies:  arts and crafts: drawing, play with my pet , listen to positive and upbeat music: any, change body temperature (ice pack/cold water)  and paced breathing/progressive muscle relaxation  ? Physical Activities: go for a walk, deep breathing and stretching   ? Focus on helpful thoughts:  \"You've been through this before, you can get through it again.\"  Step 3: People and social settings that provide distraction:                 Name: Carmen                            Name: Darien                           Name: Aleida       ? pool, shopping, Carmen's house, [employer]       Step 4: Remind myself of people and things that are important to me and worth living for:  Clifford Little Donna, post-COVID world, options of what could be in your future        Step 5: When I am in crisis, I can ask these people to help me use my safety plan:                 Name: Sidney  Step 6: Making the environment safe:   ? go to sleep/daydream  Step 7: Professionals or agencies I can contact during a crisis:  ? [facility] Daytime Number: [phone]  ? Suicide Prevention Lifeline: 4-927-505-TALK (0481)  ? Crisis Text Line Service (available 24 hours a day, 7 days a week): Text [place] to 955498    Local Crisis Services: [facility] Crisis: [phone]     Call 911 or go to my nearest emergency department.       I helped develop this safety plan and agree to use it when needed.  I have been given a copy of this plan.       Client signature _________________________________________________________________  Today s date:  11/24/2020  Adapted from Safety Plan Template 2008 Randi Poole and Robby Barba is reprinted with the express permission of the authors.  No portion of the Safety Plan Template may be reproduced without the express, written permission.  You can contact the " authors at [email or web address] or [email or web address].

## 2022-07-28 ENCOUNTER — VIRTUAL VISIT (OUTPATIENT)
Dept: PSYCHOLOGY | Facility: CLINIC | Age: 67
End: 2022-07-28
Payer: MEDICARE

## 2022-07-28 DIAGNOSIS — F43.9 TRAUMA AND STRESSOR-RELATED DISORDER: ICD-10-CM

## 2022-07-28 DIAGNOSIS — F41.1 GENERALIZED ANXIETY DISORDER: ICD-10-CM

## 2022-07-28 DIAGNOSIS — F31.81 BIPOLAR 2 DISORDER (H): Primary | ICD-10-CM

## 2022-07-28 PROCEDURE — 90837 PSYTX W PT 60 MINUTES: CPT | Mod: 95 | Performed by: SOCIAL WORKER

## 2022-07-28 NOTE — PROGRESS NOTES
[facility]                                     Progress Note    Patient Name: Randi Cleary  Date: 7/28/22         Service Type: Individual     Session Start Time: 10:30 AM  Session End Time: 11:23 AM     Session Length: 53 minutes    Session #: 124    Attendees: Client attended alone    Service Modality:  Video Visit:      Provider verified identity through the following two step process.  Patient provided:  Patient is known previously to provider    Telemedicine Visit: The patient's condition can be safely assessed and treated via synchronous audio and visual telemedicine encounter.      Reason for Telemedicine Visit: Patient convenience (e.g. access to timely appointments / distance to available provider)    Originating Site (Patient Location): Patient's home    Distant Site (Provider Location): Provider Remote Setting- Home Office    Consent:  The patient/guardian has verbally consented to: the potential risks and benefits of telemedicine (video visit) versus in person care; bill my insurance or make self-payment for services provided; and responsibility for payment of non-covered services.     Patient would like the video invitation sent by:  My Chart    Mode of Communication:  Video Conference via Amwell    As the provider I attest to compliance with applicable laws and regulations related to telemedicine.          DATA  Extended Session (53+ minutes): No  Interactive Complexity: No  Crisis: No        Progress Since Last Session (Related to Symptoms / Goals / Homework):   Symptoms: Brighter mood, more hope, but still struggling day to day with low motivation    Homework: Progress made  Get to the pool     Episode of Care Goals: Satisfactory progress - ACTION (Actively working towards change); Intervened by reinforcing change plan / affirming steps taken     Current / Ongoing Stressors and Concerns:   Patient is currently socially isolated. She has a conflictual relationship with her  .  She is getting minimal physical activity.  She had recent eye surgery and shoulder surgery.     Treatment Objective(s) Addressed in This Session:   Patient will increase frequency of engaging her in ADLs.  Patient will track and record at least 5 pleasant exchanges with . Patient will be able to identify at least 5 positive traits about her .  Patient will reduce level of depressive and anxious features as evidenced by reduction in score on her CHAVO-7 and PHQ-9 (scores of 15 and 16 at first measurment, respectively).     Intervention:   Supportive Therapy: Talked about challenges with her health recently and navigating her doctor's offices.  Looked at ways to care for herself, including socializing. Discussed politics that were bothering her and how to not be fully engulfed in it. Talked about some financial restraints being loosened as her lawsuit is settled and how this is impacting her. Looked at next steps for herself.     The following assessments were completed by patient for this visit:  PHQ9: of note, patient reported she was in a hurry and didn't read all questions, she reports no suicidal ideation  PHQ-9 SCORE 6/14/2022 6/21/2022 6/24/2022 6/28/2022 6/30/2022 7/14/2022 7/26/2022   PHQ-9 Total Score MyChart 12 (Moderate depression) 16 (Moderately severe depression) 14 (Moderate depression) 14 (Moderate depression) 17 (Moderately severe depression) 15 (Moderately severe depression) 13 (Moderate depression)   PHQ-9 Total Score 12 16 14 14 17 15 13   Some encounter information is confidential and restricted. Go to Review [email or web address] activity to see all data.     GAD7:   CHAVO-7 SCORE 5/10/2022 5/10/2022 6/9/2022 6/24/2022 7/14/2022 7/14/2022 7/14/2022   Total Score - 18 (severe anxiety) 14 (moderate anxiety) 16 (severe anxiety) - - 13 (moderate anxiety)   Total Score 18 18 14 16 13 13 13   Some encounter information is confidential and restricted. Go to Review [email or web address] activity to see all  data.     PROMIS 10-Global Health (only subscores and total score):   PROMIS-10 Scores Only 12/14/2021 3/15/2022 3/15/2022 3/15/2022 3/24/2022 4/1/2022 6/9/2022   Global Mental Health Score 6 6 6 6 8 12 12   Global Physical Health Score 9 9 9 9 8 11 10   PROMIS TOTAL - SUBSCORES 15 15 15 15 16 23 22        ASSESSMENT: Current Emotional / Mental Status (status of significant symptoms):   Risk status (Self / Other harm or suicidal ideation)   Patient denies current fears or concerns for personal safety.   Patient denies current or recent suicidal ideation or behaviors.   Patient denies current or recent homicidal ideation or behaviors.   Patient denies current or recent self injurious behavior or ideation.   Patient denies other safety concerns.   Patient reports there has been no change in risk factors since their last session.     Patient reports there has been no change in protective factors since their last session.     A safety and risk management plan has been developed including: Patient has no change in safety concerns. Committed to safety and agreed to follow previously developed safety plan..  Patient consented to co-developed safety plan.  Safety and risk management plan was completed.  Patient agreed to use safety plan should any safety concerns arise.  A copy was given to the patient.     Appearance:   Appropriate    Eye Contact:   Good    Psychomotor Behavior: Normal    Attitude:   Cooperative    Orientation:   All   Speech    Rate / Production: Normal/ Responsive    Volume:  Normal    Mood:    Normal   Affect:    Appropriate    Thought Content:  Clear    Thought Form:  Coherent    Insight:    Good      Medication Review:   No changes to current psychiatric medication(s)     Medication Compliance:   Yes     Changes in Health Issues:   None reported     Chemical Use Review:   Substance Use: Chemical use reviewed, no active concerns identified Nothing used since August 2021.     Tobacco Use: No current  tobacco use.      Diagnosis:  1. Bipolar 2 disorder (H)    2. Generalized anxiety disorder    3. Trauma and stressor-related disorder      Collateral Reports Completed:   Not Applicable    PLAN: (Patient Tasks / Therapist Tasks / Other)  Slow down and get organized  Spend time with Josephine and with Sidney    There has been demonstrated improvement in functioning while patient has been engaged in psychotherapy/psychological service- if withdrawn the patient would deteriorate and/or relapse.     CRUZ BAKER MICAELA ARELLANO   2022                                                        ______________________________________________________________________    Individual Treatment Plan    Patient's Name: Randi Cleary  YOB: 1955    Date of Creation: 20  Date Treatment Plan Last Reviewed/Revised: 22    DSM5 Diagnoses: 296.89 Bipolar II Disorder Depressed, 300.02 (F41.1) Generalized Anxiety Disorder or Adjustment Disorders  309.89 (F43.8) Other Specified Trauma and Stressor Related Disorder  Psychosocial / Contextual Factors: Patient's entire family of origin has , she now has a sister-in-law and  as support.  Relationship with  is conflictual. She is recovering from surgeries  PROMIS (reviewed every 90 days): PROMIS-10 Scores  PROMIS 10-Global Health (only subscores and total score):   PROMIS-10 Scores Only 2021 3/15/2022 3/15/2022 3/15/2022 3/24/2022 2022 2022   Global Mental Health Score 6 6 6 6 8 12 12   Global Physical Health Score 9 9 9 9 8 11 10   PROMIS TOTAL - SUBSCORES 15 15 15 15 16 23 22       Referral / Collaboration:  Referral to another professional/service is not indicated at this time..    Anticipated number of session for this episode of care: 50  Anticipation frequency of session: Biweekly  Anticipated Duration of each session: 53 or more minutes due to intensity of trauma symptoms  Treatment plan will be reviewed in 90 days or when goals have been  "changed.   There has been demonstrated improvement in functioning while patient has been engaged in psychotherapy/psychological service- if withdrawn the patient would deteriorate and/or relapse.       MeasurableTreatment Goal(s) related to diagnosis / functional impairment(s)  Goal 1: Patient will \"jumpstart, getting going with the things I need to be doing around the house as far as picking up, doing things, trying to do something every day.  Also to lessen the animosity between me and my .\"    I will know I've met my goal when my shoulders are fixed and I can see.      Objective #A (Patient Action)    Patient will increase frequency of engaging her in ADLs.  Status: Continued - Date(s): 12/10/21, 3/9/22, 6/9/22    Intervention(s)  Therapist will engage patient in CBT, specifically behavioral activation.    Objective #B  Patient will  track and record at least 5 pleasant exchanges with . Patient will be able to identify at least 5 positive traits about her  and how he relates to her.  Status: Continued - Date(s): 12/10/21, 3/9/22, 6/9/22    Intervention(s)  Therapist will teach assertiveness skills and assign homework related to relationship interactions.    Objective #C  Patient will reduce level of depressive and anxious features as evidenced by reduction in score on her CHAVO-7 and PHQ-9 (scores of 15 and 16 at first measurment, respectively).  Status: Continued - Date(s): 12/10/21, 3/9/22, 6/9/22    Intervention(s)  Therapist will engage patient in person-centered therapy and CBT.    Patient has reviewed and agreed to the above plan.      MICAELA SLADE  June 9, 2022                                                   Randi Cleary          SAFETY PLAN:  Step 1: Warning signs / cues (Thoughts, images, mood, situation, behavior) that a crisis may be developing:  ? Thoughts: \"I don't want to continue\" \"I am unwanted\"  ? Images: none  ? Thinking " "Processes: ruminating  ? Mood: anger  ? Behaviors: isolating/withdrawing , can't stop crying, not taking care of myself and not taking care of my responsibilities  ? Situations: small triggers, such as not being able to find something, or dropping something   Step 2: Coping strategies - Things I can do to take my mind off of my problems without contacting another person (relaxation technique, physical activity):  ? Distress Tolerance Strategies:  arts and crafts: drawing, play with my pet , listen to positive and upbeat music: any, change body temperature (ice pack/cold water)  and paced breathing/progressive muscle relaxation  ? Physical Activities: go for a walk, deep breathing and stretching   ? Focus on helpful thoughts:  \"You've been through this before, you can get through it again.\"  Step 3: People and social settings that provide distraction:                 Name: Carmen                            Name: Darien                           Name: Aleida       ? pool, shopping, Carmen's house, [employer]       Step 4: Remind myself of people and things that are important to me and worth living for:  Clifford Little Donna, post-COVID world, options of what could be in your future        Step 5: When I am in crisis, I can ask these people to help me use my safety plan:                 Name: Sidney  Step 6: Making the environment safe:   ? go to sleep/daydream  Step 7: Professionals or agencies I can contact during a crisis:  ? [facility] Daytime Number: [phone]  ? Suicide Prevention Lifeline: 5-562-578-TBLB (1704)  ? Crisis Text Line Service (available 24 hours a day, 7 days a week): Text [place] to 709466    Local Crisis Services: [facility] Crisis: [phone]     Call 911 or go to my nearest emergency department.       I helped develop this safety plan and agree to use it when needed.  I have been given a copy of this plan.       Client " signature _________________________________________________________________  Today s date:  11/24/2020  Adapted from Safety Plan Template 2008 Randi Poole and Robby Barba is reprinted with the express permission of the authors.  No portion of the Safety Plan Template may be reproduced without the express, written permission.  You can contact the authors at [email or web address] or [email or web address].

## 2022-08-02 ENCOUNTER — VIRTUAL VISIT (OUTPATIENT)
Dept: PSYCHOLOGY | Facility: CLINIC | Age: 67
End: 2022-08-02
Payer: MEDICARE

## 2022-08-02 DIAGNOSIS — F43.9 TRAUMA AND STRESSOR-RELATED DISORDER: ICD-10-CM

## 2022-08-02 DIAGNOSIS — F31.81 BIPOLAR 2 DISORDER (H): Primary | ICD-10-CM

## 2022-08-02 DIAGNOSIS — F41.1 GENERALIZED ANXIETY DISORDER: ICD-10-CM

## 2022-08-02 PROCEDURE — 90837 PSYTX W PT 60 MINUTES: CPT | Mod: 95 | Performed by: SOCIAL WORKER

## 2022-08-02 NOTE — PROGRESS NOTES
[facility]                                     Progress Note    Patient Name: Randi Cleary  Date: 8/2/22         Service Type: Individual     Session Start Time: 8:33 AM  Session End Time: 9:28 AM     Session Length: 55 minutes    Session #: 125    Attendees: Client attended alone    Service Modality:  Video Visit:      Provider verified identity through the following two step process.  Patient provided:  Patient is known previously to provider    Telemedicine Visit: The patient's condition can be safely assessed and treated via synchronous audio and visual telemedicine encounter.      Reason for Telemedicine Visit: Patient convenience (e.g. access to timely appointments / distance to available provider)    Originating Site (Patient Location): Patient's home    Distant Site (Provider Location): Provider Remote Setting- Home Office    Consent:  The patient/guardian has verbally consented to: the potential risks and benefits of telemedicine (video visit) versus in person care; bill my insurance or make self-payment for services provided; and responsibility for payment of non-covered services.     Patient would like the video invitation sent by:  My Chart    Mode of Communication:  Video Conference via Amwell    As the provider I attest to compliance with applicable laws and regulations related to telemedicine.          DATA  Extended Session (53+ minutes): PROLONGED SERVICE IN THE OUTPATIENT SETTING REQUIRING DIRECT (FACE-TO-FACE) PATIENT CONTACT BEYOND THE USUAL SERVICE:    - Treatment protocol required additional time to complete, due to the nature of diagnosis being treated.  See Interventions section for details  Interactive Complexity: No  Crisis: No        Progress Since Last Session (Related to Symptoms / Goals / Homework):   Symptoms: Had a tough morning, but has had a good week otherwise    Homework: Progress made  Slow down and get organized  Spend time with Josephine and with  Sidney     Episode of Care Goals: Satisfactory progress - ACTION (Actively working towards change); Intervened by reinforcing change plan / affirming steps taken     Current / Ongoing Stressors and Concerns:   Patient is currently socially isolated. She has a conflictual relationship with her .  She is getting minimal physical activity.  She had recent eye surgery and shoulder surgery.     Treatment Objective(s) Addressed in This Session:   Patient will increase frequency of engaging her in ADLs.  Patient will track and record at least 5 pleasant exchanges with . Patient will be able to identify at least 5 positive traits about her .  Patient will reduce level of depressive and anxious features as evidenced by reduction in score on her CHAVO-7 and PHQ-9 (scores of 15 and 16 at first measurment, respectively).     Intervention:   Supportive Therapy: Processed situations with her friends and how she's handling things differently than in the past and how this has made a difference. Processed how she's doing things differently and how to continue with this. Talked about how to continue to manage self care and take things slowly.     The following assessments were completed by patient for this visit:  PHQ9: of note, patient reported she was in a hurry and didn't read all questions, she reports no suicidal ideation  PHQ-9 SCORE 6/14/2022 6/21/2022 6/24/2022 6/28/2022 6/30/2022 7/14/2022 7/26/2022   PHQ-9 Total Score MyChart 12 (Moderate depression) 16 (Moderately severe depression) 14 (Moderate depression) 14 (Moderate depression) 17 (Moderately severe depression) 15 (Moderately severe depression) 13 (Moderate depression)   PHQ-9 Total Score 12 16 14 14 17 15 13   Some encounter information is confidential and restricted. Go to Review Flowsheets activity to see all data.     GAD7:   CHAVO-7 SCORE 5/10/2022 5/10/2022 6/9/2022 6/24/2022 7/14/2022 7/14/2022 7/14/2022   Total Score - 18 (severe anxiety) 14  (moderate anxiety) 16 (severe anxiety) - - 13 (moderate anxiety)   Total Score 18 18 14 16 13 13 13   Some encounter information is confidential and restricted. Go to Review Flowsheets activity to see all data.     PROMIS 10-Global Health (only subscores and total score):   PROMIS-10 Scores Only 12/14/2021 3/15/2022 3/15/2022 3/15/2022 3/24/2022 4/1/2022 6/9/2022   Global Mental Health Score 6 6 6 6 8 12 12   Global Physical Health Score 9 9 9 9 8 11 10   PROMIS TOTAL - SUBSCORES 15 15 15 15 16 23 22        ASSESSMENT: Current Emotional / Mental Status (status of significant symptoms):   Risk status (Self / Other harm or suicidal ideation)   Patient denies current fears or concerns for personal safety.   Patient denies current or recent suicidal ideation or behaviors.   Patient denies current or recent homicidal ideation or behaviors.   Patient denies current or recent self injurious behavior or ideation.   Patient denies other safety concerns.   Patient reports there has been no change in risk factors since their last session.     Patient reports there has been no change in protective factors since their last session.     A safety and risk management plan has been developed including: Patient has no change in safety concerns. Committed to safety and agreed to follow previously developed safety plan..  Patient consented to co-developed safety plan.  Safety and risk management plan was completed.  Patient agreed to use safety plan should any safety concerns arise.  A copy was given to the patient.     Appearance:   Appropriate    Eye Contact:   Good    Psychomotor Behavior: Normal    Attitude:   Cooperative    Orientation:   All   Speech    Rate / Production: Normal/ Responsive    Volume:  Normal    Mood:    Normal   Affect:    Appropriate    Thought Content:  Clear    Thought Form:  Coherent    Insight:    Good      Medication Review:   No changes to current psychiatric medication(s)     Medication  Compliance:   Yes     Changes in Health Issues:   None reported     Chemical Use Review:   Substance Use: Chemical use reviewed, no active concerns identified Nothing used since 2021.     Tobacco Use: No current tobacco use.      Diagnosis:  1. Bipolar 2 disorder (H)    2. Generalized anxiety disorder    3. Trauma and stressor-related disorder      Collateral Reports Completed:   Not Applicable    PLAN: (Patient Tasks / Therapist Tasks / Other)  Continue to take it slow and respect your needs    There has been demonstrated improvement in functioning while patient has been engaged in psychotherapy/psychological service- if withdrawn the patient would deteriorate and/or relapse.     MICAELA SLADE   2022                                                        ______________________________________________________________________    Individual Treatment Plan    Patient's Name: Randi Cleary  YOB: 1955    Date of Creation: 20  Date Treatment Plan Last Reviewed/Revised: 22    DSM5 Diagnoses: 296.89 Bipolar II Disorder Depressed, 300.02 (F41.1) Generalized Anxiety Disorder or Adjustment Disorders  309.89 (F43.8) Other Specified Trauma and Stressor Related Disorder  Psychosocial / Contextual Factors: Patient's entire family of origin has , she now has a sister-in-law and  as support.  Relationship with  is conflictual. She is recovering from surgeries  PROMIS (reviewed every 90 days): PROMIS-10 Scores  PROMIS 10-Global Health (only subscores and total score):   PROMIS-10 Scores Only 2021 3/15/2022 3/15/2022 3/15/2022 3/24/2022 2022 2022   Global Mental Health Score 6 6 6 6 8 12 12   Global Physical Health Score 9 9 9 9 8 11 10   PROMIS TOTAL - SUBSCORES 15 15 15 15 16 23 22       Referral / Collaboration:  Referral to another professional/service is not indicated at this time..    Anticipated number of session for this episode of care:  "50  Anticipation frequency of session: Biweekly  Anticipated Duration of each session: 53 or more minutes due to intensity of trauma symptoms  Treatment plan will be reviewed in 90 days or when goals have been changed.   There has been demonstrated improvement in functioning while patient has been engaged in psychotherapy/psychological service- if withdrawn the patient would deteriorate and/or relapse.       MeasurableTreatment Goal(s) related to diagnosis / functional impairment(s)  Goal 1: Patient will \"jumpstart, getting going with the things I need to be doing around the house as far as picking up, doing things, trying to do something every day.  Also to lessen the animosity between me and my .\"    I will know I've met my goal when my shoulders are fixed and I can see.      Objective #A (Patient Action)    Patient will increase frequency of engaging her in ADLs.  Status: Continued - Date(s): 12/10/21, 3/9/22, 6/9/22    Intervention(s)  Therapist will engage patient in CBT, specifically behavioral activation.    Objective #B  Patient will  track and record at least 5 pleasant exchanges with . Patient will be able to identify at least 5 positive traits about her  and how he relates to her.  Status: Continued - Date(s): 12/10/21, 3/9/22, 6/9/22    Intervention(s)  Therapist will teach assertiveness skills and assign homework related to relationship interactions.    Objective #C  Patient will reduce level of depressive and anxious features as evidenced by reduction in score on her CHAVO-7 and PHQ-9 (scores of 15 and 16 at first measurment, respectively).  Status: Continued - Date(s): 12/10/21, 3/9/22, 6/9/22    Intervention(s)  Therapist will engage patient in person-centered therapy and CBT.    Patient has reviewed and agreed to the above plan.      MICAELA SLADE  June 9, 2022                                                   Randi Cleary          SAFETY PLAN:  Step 1: Warning signs / " "cues (Thoughts, images, mood, situation, behavior) that a crisis may be developing:  ? Thoughts: \"I don't want to continue\" \"I am unwanted\"  ? Images: none  ? Thinking Processes: ruminating  ? Mood: anger  ? Behaviors: isolating/withdrawing , can't stop crying, not taking care of myself and not taking care of my responsibilities  ? Situations: small triggers, such as not being able to find something, or dropping something   Step 2: Coping strategies - Things I can do to take my mind off of my problems without contacting another person (relaxation technique, physical activity):  ? Distress Tolerance Strategies:  arts and crafts: drawing, play with my pet , listen to positive and upbeat music: any, change body temperature (ice pack/cold water)  and paced breathing/progressive muscle relaxation  ? Physical Activities: go for a walk, deep breathing and stretching   ? Focus on helpful thoughts:  \"You've been through this before, you can get through it again.\"  Step 3: People and social settings that provide distraction:                 Name: Carmen                            Name: Darien                           Name: Aleida       ? pool, shopping, Carmen's house, [employer]       Step 4: Remind myself of people and things that are important to me and worth living for:  Clifford Little Donna, post-COVID world, options of what could be in your future        Step 5: When I am in crisis, I can ask these people to help me use my safety plan:                 Name: Sidney  Step 6: Making the environment safe:   ? go to sleep/daydream  Step 7: Professionals or agencies I can contact during a crisis:  ? [facility] Daytime Number: [phone]  ? Suicide Prevention Lifeline: 9-677-908-KCHN (3085)  ? Crisis Text Line Service (available 24 hours a day, 7 days a week): Text [place] to 568815    Local Crisis Services: [facility] Crisis: [phone]     Call 911 or go to my nearest emergency department.       I helped " develop this safety plan and agree to use it when needed.  I have been given a copy of this plan.       Client signature _________________________________________________________________  Today s date:  11/24/2020  Adapted from Safety Plan Template 2008 Randi Poole and Robby Barba is reprinted with the express permission of the authors.  No portion of the Safety Plan Template may be reproduced without the express, written permission.  You can contact the authors at [email or web address] or [email or web address].

## 2022-08-04 ENCOUNTER — TELEPHONE (OUTPATIENT)
Dept: PALLIATIVE MEDICINE | Facility: OTHER | Age: 67
End: 2022-08-04

## 2022-08-04 NOTE — TELEPHONE ENCOUNTER
[facility]    Phone Message    May a detailed message be left on voicemail: yes     Reason for Call: Other: Patient is calling to ask a message be sent to Dr. Dubois to state she is short on her medications, the pharmacy wont fill until next week and she does not have enought to get her through. Please reach out to patient to discuss. Thank you      Action Taken: Other: [facility] PAIN     Travel Screening: Not Applicable

## 2022-08-05 ENCOUNTER — VIRTUAL VISIT (OUTPATIENT)
Dept: PSYCHOLOGY | Facility: CLINIC | Age: 67
End: 2022-08-05
Payer: MEDICARE

## 2022-08-05 DIAGNOSIS — F31.81 BIPOLAR 2 DISORDER (H): Primary | ICD-10-CM

## 2022-08-05 DIAGNOSIS — F43.9 TRAUMA AND STRESSOR-RELATED DISORDER: ICD-10-CM

## 2022-08-05 DIAGNOSIS — F41.1 GENERALIZED ANXIETY DISORDER: ICD-10-CM

## 2022-08-05 PROCEDURE — 90837 PSYTX W PT 60 MINUTES: CPT | Mod: 95 | Performed by: SOCIAL WORKER

## 2022-08-05 NOTE — PROGRESS NOTES
[facility]                                     Progress Note    Patient Name: Randi Cleary  Date: 8/5/22         Service Type: Individual     Session Start Time: 10:00 AM  Session End Time: 10:57 AM     Session Length: 57 minutes    Session #: 126    Attendees: Client attended alone    Service Modality:  Video Visit:      Provider verified identity through the following two step process.  Patient provided:  Patient is known previously to provider    Telemedicine Visit: The patient's condition can be safely assessed and treated via synchronous audio and visual telemedicine encounter.      Reason for Telemedicine Visit: Patient convenience (e.g. access to timely appointments / distance to available provider)    Originating Site (Patient Location): Patient's home    Distant Site (Provider Location): Provider Remote Setting- Home Office    Consent:  The patient/guardian has verbally consented to: the potential risks and benefits of telemedicine (video visit) versus in person care; bill my insurance or make self-payment for services provided; and responsibility for payment of non-covered services.     Patient would like the video invitation sent by:  My Chart    Mode of Communication:  Video Conference via [employer]well    As the provider I attest to compliance with applicable laws and regulations related to telemedicine.          DATA  Extended Session (53+ minutes): PROLONGED SERVICE IN THE OUTPATIENT SETTING REQUIRING DIRECT (FACE-TO-FACE) PATIENT CONTACT BEYOND THE USUAL SERVICE:    - Treatment protocol required additional time to complete, due to the nature of diagnosis being treated.  See Interventions section for details  Interactive Complexity: No  Crisis: No        Progress Since Last Session (Related to Symptoms / Goals / Homework):   Symptoms: Overall managing ok this week    Homework: Progress made  Continue to take it slow and respect your needs -done     Episode of Care Goals:  Satisfactory progress - ACTION (Actively working towards change); Intervened by reinforcing change plan / affirming steps taken     Current / Ongoing Stressors and Concerns:   Patient is currently socially isolated. She has a conflictual relationship with her .  She is getting minimal physical activity.  She had recent eye surgery and shoulder surgery.     Treatment Objective(s) Addressed in This Session:   Patient will increase frequency of engaging her in ADLs.  Patient will track and record at least 5 pleasant exchanges with . Patient will be able to identify at least 5 positive traits about her .  Patient will reduce level of depressive and anxious features as evidenced by reduction in score on her CHAVO-7 and PHQ-9 (scores of 15 and 16 at first measurment, respectively).     Intervention:   Supportive Therapy: Processed situation with friend who is moving. Discussed her self-care as her friend transitions. Looked at her ways to manage her mood, including recently exploration of alternative ways to have her cannabis dispensed. Looked at next steps, including discussing this with the prescribing physician as well as continued exploration with the pharmacist.     The following assessments were completed by patient for this visit:  PHQ9: of note, patient reported she was in a hurry and didn't read all questions, she reports no suicidal ideation  PHQ-9 SCORE 6/14/2022 6/21/2022 6/24/2022 6/28/2022 6/30/2022 7/14/2022 7/26/2022   PHQ-9 Total Score MyChart 12 (Moderate depression) 16 (Moderately severe depression) 14 (Moderate depression) 14 (Moderate depression) 17 (Moderately severe depression) 15 (Moderately severe depression) 13 (Moderate depression)   PHQ-9 Total Score 12 16 14 14 17 15 13   Some encounter information is confidential and restricted. Go to Review Flowsheets activity to see all data.     GAD7:   CHAVO-7 SCORE 5/10/2022 5/10/2022 6/9/2022/9/2022 6/24/2022 7/14/2022 7/14/2022 7/14/2022    Total Score - 18 (severe anxiety) 14 (moderate anxiety) 16 (severe anxiety) - - 13 (moderate anxiety)   Total Score 18 18 14 16 13 13 13   Some encounter information is confidential and restricted. Go to Review Flowsheets activity to see all data.     PROMIS 10-Global Health (only subscores and total score):   PROMIS-10 Scores Only 12/14/2021 3/15/2022 3/15/2022 3/15/2022 3/24/2022 4/1/2022 6/9/2022   Global Mental Health Score 6 6 6 6 8 12 12   Global Physical Health Score 9 9 9 9 8 11 10   PROMIS TOTAL - SUBSCORES 15 15 15 15 16 23 22        ASSESSMENT: Current Emotional / Mental Status (status of significant symptoms):   Risk status (Self / Other harm or suicidal ideation)   Patient denies current fears or concerns for personal safety.   Patient denies current or recent suicidal ideation or behaviors.   Patient denies current or recent homicidal ideation or behaviors.   Patient denies current or recent self injurious behavior or ideation.   Patient denies other safety concerns.   Patient reports there has been no change in risk factors since their last session.     Patient reports there has been no change in protective factors since their last session.     A safety and risk management plan has been developed including: Patient has no change in safety concerns. Committed to safety and agreed to follow previously developed safety plan..  Patient consented to co-developed safety plan.  Safety and risk management plan was completed.  Patient agreed to use safety plan should any safety concerns arise.  A copy was given to the patient.     Appearance:   Appropriate    Eye Contact:   Good    Psychomotor Behavior: Normal    Attitude:   Cooperative    Orientation:   All   Speech    Rate / Production: Normal/ Responsive    Volume:  Normal    Mood:    Normal   Affect:    Appropriate    Thought Content:  Clear    Thought Form:  Coherent    Insight:    Good      Medication Review:   No changes to current psychiatric  medication(s)     Medication Compliance:   Yes     Changes in Health Issues:   None reported     Chemical Use Review:   Substance Use: Chemical use reviewed, no active concerns identified Nothing used since 2021.     Tobacco Use: No current tobacco use.      Diagnosis:  1. Bipolar 2 disorder (H)    2. Generalized anxiety disorder    3. Trauma and stressor-related disorder      Collateral Reports Completed:   Not Applicable    PLAN: (Patient Tasks / Therapist Tasks / Other)  Continue to take it slow and respect your needs    There has been demonstrated improvement in functioning while patient has been engaged in psychotherapy/psychological service- if withdrawn the patient would deteriorate and/or relapse.     MICAELA SLADE   2022                                                        ______________________________________________________________________    Individual Treatment Plan    Patient's Name: Randi Cleary  YOB: 1955    Date of Creation: 20  Date Treatment Plan Last Reviewed/Revised: 22    DSM5 Diagnoses: 296.89 Bipolar II Disorder Depressed, 300.02 (F41.1) Generalized Anxiety Disorder or Adjustment Disorders  309.89 (F43.8) Other Specified Trauma and Stressor Related Disorder  Psychosocial / Contextual Factors: Patient's entire family of origin has , she now has a sister-in-law and  as support.  Relationship with  is conflictual. She is recovering from surgeries  PROMIS (reviewed every 90 days): PROMIS-10 Scores  PROMIS 10-Global Health (only subscores and total score):   PROMIS-10 Scores Only 2021 3/15/2022 3/15/2022 3/15/2022 3/24/2022 2022 2022   Global Mental Health Score 6 6 6 6 8 12 12   Global Physical Health Score 9 9 9 9 8 11 10   PROMIS TOTAL - SUBSCORES 15 15 15 15 16 23 22       Referral / Collaboration:  Referral to another professional/service is not indicated at this time..    Anticipated number of session for  "this episode of care: 50  Anticipation frequency of session: Biweekly  Anticipated Duration of each session: 53 or more minutes due to intensity of trauma symptoms  Treatment plan will be reviewed in 90 days or when goals have been changed.   There has been demonstrated improvement in functioning while patient has been engaged in psychotherapy/psychological service- if withdrawn the patient would deteriorate and/or relapse.       MeasurableTreatment Goal(s) related to diagnosis / functional impairment(s)  Goal 1: Patient will \"jumpstart, getting going with the things I need to be doing around the house as far as picking up, doing things, trying to do something every day.  Also to lessen the animosity between me and my .\"    I will know I've met my goal when my shoulders are fixed and I can see.      Objective #A (Patient Action)    Patient will increase frequency of engaging her in ADLs.  Status: Continued - Date(s): 12/10/21, 3/9/22, 6/9/22    Intervention(s)  Therapist will engage patient in CBT, specifically behavioral activation.    Objective #B  Patient will  track and record at least 5 pleasant exchanges with . Patient will be able to identify at least 5 positive traits about her  and how he relates to her.  Status: Continued - Date(s): 12/10/21, 3/9/22, 6/9/22    Intervention(s)  Therapist will teach assertiveness skills and assign homework related to relationship interactions.    Objective #C  Patient will reduce level of depressive and anxious features as evidenced by reduction in score on her CHAVO-7 and PHQ-9 (scores of 15 and 16 at first measurment, respectively).  Status: Continued - Date(s): 12/10/21, 3/9/22, 6/9/22    Intervention(s)  Therapist will engage patient in person-centered therapy and CBT.    Patient has reviewed and agreed to the above plan.      MICAELA SLADE  June 9, 2022                                                   Randi Cleary          SAFETY " "PLAN:  Step 1: Warning signs / cues (Thoughts, images, mood, situation, behavior) that a crisis may be developing:  ? Thoughts: \"I don't want to continue\" \"I am unwanted\"  ? Images: none  ? Thinking Processes: ruminating  ? Mood: anger  ? Behaviors: isolating/withdrawing , can't stop crying, not taking care of myself and not taking care of my responsibilities  ? Situations: small triggers, such as not being able to find something, or dropping something   Step 2: Coping strategies - Things I can do to take my mind off of my problems without contacting another person (relaxation technique, physical activity):  ? Distress Tolerance Strategies:  arts and crafts: drawing, play with my pet , listen to positive and upbeat music: any, change body temperature (ice pack/cold water)  and paced breathing/progressive muscle relaxation  ? Physical Activities: go for a walk, deep breathing and stretching   ? Focus on helpful thoughts:  \"You've been through this before, you can get through it again.\"  Step 3: People and social settings that provide distraction:                 Name: Carmen                            Name: Darien                           Name: Aleida       ? pool, shopping, Carmen's house, [employer]       Step 4: Remind myself of people and things that are important to me and worth living for:  Clifford Little Donna, post-COVID world, options of what could be in your future        Step 5: When I am in crisis, I can ask these people to help me use my safety plan:                 Name: Sidney  Step 6: Making the environment safe:   ? go to sleep/daydream  Step 7: Professionals or agencies I can contact during a crisis:  ? [facility] Daytime Number: [phone]  ? Suicide Prevention Lifeline: 7-786-424-TALK (7566)  ? Crisis Text Line Service (available 24 hours a day, 7 days a week): Text [place] to 029226    Local Crisis Services: [facility] Crisis: [phone]     Call 911 or go to my nearest emergency " department.       I helped develop this safety plan and agree to use it when needed.  I have been given a copy of this plan.       Client signature _________________________________________________________________  Today s date:  11/24/2020  Adapted from Safety Plan Template 2008 Randi Poole and Robby Barba is reprinted with the express permission of the authors.  No portion of the Safety Plan Template may be reproduced without the express, written permission.  You can contact the authors at [email or web address] or [email or web address].

## 2022-08-05 NOTE — TELEPHONE ENCOUNTER
Message sent to patient through Piktochart in response to her getting back to the VM left.  This has been resolved and per the provider this will not be filled early.

## 2022-08-09 ENCOUNTER — VIRTUAL VISIT (OUTPATIENT)
Dept: PSYCHOLOGY | Facility: CLINIC | Age: 67
End: 2022-08-09
Payer: MEDICARE

## 2022-08-09 DIAGNOSIS — F43.9 TRAUMA AND STRESSOR-RELATED DISORDER: ICD-10-CM

## 2022-08-09 DIAGNOSIS — F41.1 GENERALIZED ANXIETY DISORDER: ICD-10-CM

## 2022-08-09 DIAGNOSIS — F31.81 BIPOLAR 2 DISORDER (H): Primary | ICD-10-CM

## 2022-08-09 PROCEDURE — 90837 PSYTX W PT 60 MINUTES: CPT | Mod: 95 | Performed by: SOCIAL WORKER

## 2022-08-09 ASSESSMENT — ANXIETY QUESTIONNAIRES
5. BEING SO RESTLESS THAT IT IS HARD TO SIT STILL: MORE THAN HALF THE DAYS
GAD7 TOTAL SCORE: 16
6. BECOMING EASILY ANNOYED OR IRRITABLE: NEARLY EVERY DAY
3. WORRYING TOO MUCH ABOUT DIFFERENT THINGS: NEARLY EVERY DAY
7. FEELING AFRAID AS IF SOMETHING AWFUL MIGHT HAPPEN: SEVERAL DAYS
GAD7 TOTAL SCORE: 16
1. FEELING NERVOUS, ANXIOUS, OR ON EDGE: NEARLY EVERY DAY
7. FEELING AFRAID AS IF SOMETHING AWFUL MIGHT HAPPEN: SEVERAL DAYS
4. TROUBLE RELAXING: MORE THAN HALF THE DAYS
2. NOT BEING ABLE TO STOP OR CONTROL WORRYING: MORE THAN HALF THE DAYS
GAD7 TOTAL SCORE: 16

## 2022-08-09 ASSESSMENT — PATIENT HEALTH QUESTIONNAIRE - PHQ9
SUM OF ALL RESPONSES TO PHQ QUESTIONS 1-9: 16
SUM OF ALL RESPONSES TO PHQ QUESTIONS 1-9: 16
10. IF YOU CHECKED OFF ANY PROBLEMS, HOW DIFFICULT HAVE THESE PROBLEMS MADE IT FOR YOU TO DO YOUR WORK, TAKE CARE OF THINGS AT HOME, OR GET ALONG WITH OTHER PEOPLE: EXTREMELY DIFFICULT

## 2022-08-09 NOTE — PROGRESS NOTES
[facility]                                     Progress Note    Patient Name: Randi Cleary  Date: 8/9/22         Service Type: Individual     Session Start Time: 3:02 PM  Session End Time: 3:57 PM     Session Length: 55 minutes    Session #: 127    Attendees: Client attended alone    Service Modality:  Video Visit:      Provider verified identity through the following two step process.  Patient provided:  Patient is known previously to provider    Telemedicine Visit: The patient's condition can be safely assessed and treated via synchronous audio and visual telemedicine encounter.      Reason for Telemedicine Visit: Patient convenience (e.g. access to timely appointments / distance to available provider)    Originating Site (Patient Location): Patient's home    Distant Site (Provider Location): Provider Remote Setting- Home Office    Consent:  The patient/guardian has verbally consented to: the potential risks and benefits of telemedicine (video visit) versus in person care; bill my insurance or make self-payment for services provided; and responsibility for payment of non-covered services.     Patient would like the video invitation sent by:  My Chart    Mode of Communication:  Video Conference via [employer]well    As the provider I attest to compliance with applicable laws and regulations related to telemedicine.          DATA  Extended Session (53+ minutes): PROLONGED SERVICE IN THE OUTPATIENT SETTING REQUIRING DIRECT (FACE-TO-FACE) PATIENT CONTACT BEYOND THE USUAL SERVICE:    - Treatment protocol required additional time to complete, due to the nature of diagnosis being treated.  See Interventions section for details  Interactive Complexity: No  Crisis: No        Progress Since Last Session (Related to Symptoms / Goals / Homework):   Symptoms: Continuing to make progress on the ADLs she's been struggling with    Homework: Progress made  Continue to take it slow and respect your needs  -done     Episode of Care Goals: Satisfactory progress - ACTION (Actively working towards change); Intervened by reinforcing change plan / affirming steps taken     Current / Ongoing Stressors and Concerns:   Patient is currently socially isolated. She has a conflictual relationship with her .  She is getting minimal physical activity.  She had recent eye surgery and shoulder surgery.     Treatment Objective(s) Addressed in This Session:   Patient will increase frequency of engaging her in ADLs.  Patient will track and record at least 5 pleasant exchanges with . Patient will be able to identify at least 5 positive traits about her .  Patient will reduce level of depressive and anxious features as evidenced by reduction in score on her CHAVO-7 and PHQ-9 (scores of 15 and 16 at first measurment, respectively).     Intervention:   Supportive Therapy: Processed how patient is doing things with her time and doing some things each day, which has been huge for her. Talked about how to keep this up and slowly add to this. Reviewed homework and identified successes and barriers to completion.  Added goals of losing weight, trying edibles, and getting to the pool.     The following assessments were completed by patient for this visit:  PHQ9: of note, patient reported she was in a hurry and didn't read all questions, she reports no suicidal ideation  PHQ-9 SCORE 6/21/2022 6/24/2022 6/28/2022 6/30/2022 7/14/2022 7/26/2022 8/9/2022   PHQ-9 Total Score MyChart 16 (Moderately severe depression) 14 (Moderate depression) 14 (Moderate depression) 17 (Moderately severe depression) 15 (Moderately severe depression) 13 (Moderate depression) 16 (Moderately severe depression)   PHQ-9 Total Score 16 14 14 17 15 13 16   Some encounter information is confidential and restricted. Go to Review Flowsheets activity to see all data.     GAD7:   CHAVO-7 SCORE 5/10/2022 6/9/2022 6/24/2022 7/14/2022 7/14/2022 7/14/2022 8/9/2022    Total Score 18 (severe anxiety) 14 (moderate anxiety) 16 (severe anxiety) - - 13 (moderate anxiety) 16 (severe anxiety)   Total Score 18 14 16 13 13 13 16   Some encounter information is confidential and restricted. Go to Review Flowsheets activity to see all data.     PROMIS 10-Global Health (only subscores and total score):   PROMIS-10 Scores Only 12/14/2021 3/15/2022 3/15/2022 3/15/2022 3/24/2022 4/1/2022 6/9/2022   Global Mental Health Score 6 6 6 6 8 12 12   Global Physical Health Score 9 9 9 9 8 11 10   PROMIS TOTAL - SUBSCORES 15 15 15 15 16 23 22        ASSESSMENT: Current Emotional / Mental Status (status of significant symptoms):   Risk status (Self / Other harm or suicidal ideation)   Patient denies current fears or concerns for personal safety.   Patient denies current or recent suicidal ideation or behaviors.   Patient denies current or recent homicidal ideation or behaviors.   Patient denies current or recent self injurious behavior or ideation.   Patient denies other safety concerns.   Patient reports there has been no change in risk factors since their last session.     Patient reports there has been no change in protective factors since their last session.     A safety and risk management plan has been developed including: Patient has no change in safety concerns. Committed to safety and agreed to follow previously developed safety plan..  Patient consented to co-developed safety plan.  Safety and risk management plan was completed.  Patient agreed to use safety plan should any safety concerns arise.  A copy was given to the patient.     Appearance:   Appropriate    Eye Contact:   Good    Psychomotor Behavior: Normal    Attitude:   Cooperative    Orientation:   All   Speech    Rate / Production: Normal/ Responsive    Volume:  Normal    Mood:    Normal   Affect:    Appropriate    Thought Content:  Clear    Thought Form:  Coherent    Insight:    Good      Medication Review:   No changes to current  psychiatric medication(s)     Medication Compliance:   Yes     Changes in Health Issues:   None reported     Chemical Use Review:   Substance Use: Chemical use reviewed, no active concerns identified Nothing used since 2021.     Tobacco Use: No current tobacco use.      Diagnosis:  1. Bipolar 2 disorder (H)    2. Generalized anxiety disorder    3. Trauma and stressor-related disorder      Collateral Reports Completed:   Not Applicable    PLAN: (Patient Tasks / Therapist Tasks / Other)  Do the living room  Finish your bedroom    There has been demonstrated improvement in functioning while patient has been engaged in psychotherapy/psychological service- if withdrawn the patient would deteriorate and/or relapse.     MICAELA SLADE   2022                                                        ______________________________________________________________________    Individual Treatment Plan    Patient's Name: Randi Cleary  YOB: 1955    Date of Creation: 20  Date Treatment Plan Last Reviewed/Revised: 22    DSM5 Diagnoses: 296.89 Bipolar II Disorder Depressed, 300.02 (F41.1) Generalized Anxiety Disorder or Adjustment Disorders  309.89 (F43.8) Other Specified Trauma and Stressor Related Disorder  Psychosocial / Contextual Factors: Patient's entire family of origin has , she now has a sister-in-law and  as support.  Relationship with  is conflictual. She is recovering from surgeries  PROMIS (reviewed every 90 days): PROMIS-10 Scores  PROMIS 10-Global Health (only subscores and total score):   PROMIS-10 Scores Only 2021 3/15/2022 3/15/2022 3/15/2022 3/24/2022 2022 2022   Global Mental Health Score 6 6 6 6 8 12 12   Global Physical Health Score 9 9 9 9 8 11 10   PROMIS TOTAL - SUBSCORES 15 15 15 15 16 23 22       Referral / Collaboration:  Referral to another professional/service is not indicated at this time..    Anticipated number of session  "for this episode of care: 50  Anticipation frequency of session: Biweekly  Anticipated Duration of each session: 53 or more minutes due to intensity of trauma symptoms  Treatment plan will be reviewed in 90 days or when goals have been changed.   There has been demonstrated improvement in functioning while patient has been engaged in psychotherapy/psychological service- if withdrawn the patient would deteriorate and/or relapse.       MeasurableTreatment Goal(s) related to diagnosis / functional impairment(s)  Goal 1: Patient will \"jumpstart, getting going with the things I need to be doing around the house as far as picking up, doing things, trying to do something every day.  Also to lessen the animosity between me and my .\"    I will know I've met my goal when my shoulders are fixed and I can see.      Objective #A (Patient Action)    Patient will increase frequency of engaging her in ADLs.  Status: Continued - Date(s): 12/10/21, 3/9/22, 6/9/22    Intervention(s)  Therapist will engage patient in CBT, specifically behavioral activation.    Objective #B  Patient will  track and record at least 5 pleasant exchanges with . Patient will be able to identify at least 5 positive traits about her  and how he relates to her.  Status: Continued - Date(s): 12/10/21, 3/9/22, 6/9/22    Intervention(s)  Therapist will teach assertiveness skills and assign homework related to relationship interactions.    Objective #C  Patient will reduce level of depressive and anxious features as evidenced by reduction in score on her CHAVO-7 and PHQ-9 (scores of 15 and 16 at first measurment, respectively).  Status: Continued - Date(s): 12/10/21, 3/9/22, 6/9/22    Intervention(s)  Therapist will engage patient in person-centered therapy and CBT.    Patient has reviewed and agreed to the above plan.      MICAELA SLADE  June 9, 2022                                                   Randi VALDIVIA " "PLAN:  Step 1: Warning signs / cues (Thoughts, images, mood, situation, behavior) that a crisis may be developing:  ? Thoughts: \"I don't want to continue\" \"I am unwanted\"  ? Images: none  ? Thinking Processes: ruminating  ? Mood: anger  ? Behaviors: isolating/withdrawing , can't stop crying, not taking care of myself and not taking care of my responsibilities  ? Situations: small triggers, such as not being able to find something, or dropping something   Step 2: Coping strategies - Things I can do to take my mind off of my problems without contacting another person (relaxation technique, physical activity):  ? Distress Tolerance Strategies:  arts and crafts: drawing, play with my pet , listen to positive and upbeat music: any, change body temperature (ice pack/cold water)  and paced breathing/progressive muscle relaxation  ? Physical Activities: go for a walk, deep breathing and stretching   ? Focus on helpful thoughts:  \"You've been through this before, you can get through it again.\"  Step 3: People and social settings that provide distraction:                 Name: Carmen                            Name: Darien                           Name: Aleida       ? pool, shopping, Carmen's house, [employer]       Step 4: Remind myself of people and things that are important to me and worth living for:  Clifford Little Donna, post-COVID world, options of what could be in your future        Step 5: When I am in crisis, I can ask these people to help me use my safety plan:                 Name: Sidney  Step 6: Making the environment safe:   ? go to sleep/daydream  Step 7: Professionals or agencies I can contact during a crisis:  ? [facility] Daytime Number: [phone]  ? Suicide Prevention Lifeline: 1-200-394-TALK (1959)  ? Crisis Text Line Service (available 24 hours a day, 7 days a week): Text [place] to 708702    Local Crisis Services: [facility] Crisis: [phone]     Call 911 or go to my nearest emergency " department.       I helped develop this safety plan and agree to use it when needed.  I have been given a copy of this plan.       Client signature _________________________________________________________________  Today s date:  11/24/2020  Adapted from Safety Plan Template 2008 Randi Poole and Robby Barba is reprinted with the express permission of the authors.  No portion of the Safety Plan Template may be reproduced without the express, written permission.  You can contact the authors at [email or web address] [email or web address] or [email or web address].

## 2022-08-11 ENCOUNTER — VIRTUAL VISIT (OUTPATIENT)
Dept: PSYCHOLOGY | Facility: CLINIC | Age: 67
End: 2022-08-11
Payer: MEDICARE

## 2022-08-11 DIAGNOSIS — F41.1 GENERALIZED ANXIETY DISORDER: ICD-10-CM

## 2022-08-11 DIAGNOSIS — F43.9 TRAUMA AND STRESSOR-RELATED DISORDER: ICD-10-CM

## 2022-08-11 DIAGNOSIS — F31.81 BIPOLAR 2 DISORDER (H): Primary | ICD-10-CM

## 2022-08-11 PROCEDURE — 90837 PSYTX W PT 60 MINUTES: CPT | Mod: 95 | Performed by: SOCIAL WORKER

## 2022-08-11 NOTE — PROGRESS NOTES
[facility]                                     Progress Note    Patient Name: Randi Cleary  Date: 8/11/22         Service Type: Individual     Session Start Time: 3:01 PM  Session End Time: 3:57 PM     Session Length: 56 minutes    Session #: 128    Attendees: Client attended alone    Service Modality:  Video Visit:      Provider verified identity through the following two step process.  Patient provided:  Patient is known previously to provider    Telemedicine Visit: The patient's condition can be safely assessed and treated via synchronous audio and visual telemedicine encounter.      Reason for Telemedicine Visit: Patient convenience (e.g. access to timely appointments / distance to available provider)    Originating Site (Patient Location): Patient's home    Distant Site (Provider Location): Provider Remote Setting- Home Office    Consent:  The patient/guardian has verbally consented to: the potential risks and benefits of telemedicine (video visit) versus in person care; bill my insurance or make self-payment for services provided; and responsibility for payment of non-covered services.     Patient would like the video invitation sent by:  My Chart    Mode of Communication:  Video Conference via Amwell    As the provider I attest to compliance with applicable laws and regulations related to telemedicine.          DATA  Extended Session (53+ minutes): PROLONGED SERVICE IN THE OUTPATIENT SETTING REQUIRING DIRECT (FACE-TO-FACE) PATIENT CONTACT BEYOND THE USUAL SERVICE:    - Treatment protocol required additional time to complete, due to the nature of diagnosis being treated.  See Interventions section for details  Interactive Complexity: No  Crisis: No        Progress Since Last Session (Related to Symptoms / Goals / Homework):   Symptoms: Improving having a good week    Homework: Progress made  Do the living room  Finish your bedroom     Episode of Care Goals: Satisfactory progress -  ACTION (Actively working towards change); Intervened by reinforcing change plan / affirming steps taken     Current / Ongoing Stressors and Concerns:   Patient is currently socially isolated. She has a conflictual relationship with her .  She is getting minimal physical activity.  She had recent eye surgery and shoulder surgery.     Treatment Objective(s) Addressed in This Session:   Patient will increase frequency of engaging her in ADLs.  Patient will track and record at least 5 pleasant exchanges with . Patient will be able to identify at least 5 positive traits about her .  Patient will reduce level of depressive and anxious features as evidenced by reduction in score on her CHAVO-7 and PHQ-9 (scores of 15 and 16 at first measurment, respectively).     Intervention:   Supportive Therapy: Talked about finances and stresses around this. Discussed creating a plan for her money due to her past experience with money and it disappearing without a plan. Talked about budgeting related to mental health. Explored relationships. Discussed self care.      The following assessments were completed by patient for this visit:  PHQ9: of note, patient reported she was in a hurry and didn't read all questions, she reports no suicidal ideation  PHQ-9 SCORE 6/21/2022 6/24/2022 6/28/2022 6/30/2022 7/14/2022 7/26/2022 8/9/2022   PHQ-9 Total Score MyChart 16 (Moderately severe depression) 14 (Moderate depression) 14 (Moderate depression) 17 (Moderately severe depression) 15 (Moderately severe depression) 13 (Moderate depression) 16 (Moderately severe depression)   PHQ-9 Total Score 16 14 14 17 15 13 16   Some encounter information is confidential and restricted. Go to Review Flowsheets activity to see all data.     GAD7:   CHAVO-7 SCORE 5/10/2022 6/9/2022 6/24/2022 7/14/2022 7/14/2022 7/14/2022 8/9/2022   Total Score 18 (severe anxiety) 14 (moderate anxiety) 16 (severe anxiety) - - 13 (moderate anxiety) 16 (severe  anxiety)   Total Score 18 14 16 13 13 13 16   Some encounter information is confidential and restricted. Go to Review Flowsheets activity to see all data.     PROMIS 10-Global Health (only subscores and total score):   PROMIS-10 Scores Only 12/14/2021 3/15/2022 3/15/2022 3/15/2022 3/24/2022 4/1/2022 6/9/2022   Global Mental Health Score 6 6 6 6 8 12 12   Global Physical Health Score 9 9 9 9 8 11 10   PROMIS TOTAL - SUBSCORES 15 15 15 15 16 23 22        ASSESSMENT: Current Emotional / Mental Status (status of significant symptoms):   Risk status (Self / Other harm or suicidal ideation)   Patient denies current fears or concerns for personal safety.   Patient denies current or recent suicidal ideation or behaviors.   Patient denies current or recent homicidal ideation or behaviors.   Patient denies current or recent self injurious behavior or ideation.   Patient denies other safety concerns.   Patient reports there has been no change in risk factors since their last session.     Patient reports there has been no change in protective factors since their last session.     A safety and risk management plan has been developed including: Patient has no change in safety concerns. Committed to safety and agreed to follow previously developed safety plan..  Patient consented to co-developed safety plan.  Safety and risk management plan was completed.  Patient agreed to use safety plan should any safety concerns arise.  A copy was given to the patient.     Appearance:   Appropriate    Eye Contact:   Good    Psychomotor Behavior: Normal    Attitude:   Cooperative    Orientation:   All   Speech    Rate / Production: Normal/ Responsive    Volume:  Normal    Mood:    Normal   Affect:    Appropriate    Thought Content:  Clear    Thought Form:  Coherent    Insight:    Good      Medication Review:   No changes to current psychiatric medication(s)     Medication Compliance:   Yes     Changes in Health Issues:   None  reported     Chemical Use Review:   Substance Use: Chemical use reviewed, no active concerns identified Nothing used since 2021.     Tobacco Use: No current tobacco use.      Diagnosis:  1. Bipolar 2 disorder (H)    2. Generalized anxiety disorder    3. Trauma and stressor-related disorder      Collateral Reports Completed:   Not Applicable    PLAN: (Patient Tasks / Therapist Tasks / Other)  Look at debt payoff  Make a plan for the money you received      There has been demonstrated improvement in functioning while patient has been engaged in psychotherapy/psychological service- if withdrawn the patient would deteriorate and/or relapse.     MICAELA SLADE   2022                                                        ______________________________________________________________________    Individual Treatment Plan    Patient's Name: Randi Cleary  YOB: 1955    Date of Creation: 20  Date Treatment Plan Last Reviewed/Revised: 22    DSM5 Diagnoses: 296.89 Bipolar II Disorder Depressed, 300.02 (F41.1) Generalized Anxiety Disorder or Adjustment Disorders  309.89 (F43.8) Other Specified Trauma and Stressor Related Disorder  Psychosocial / Contextual Factors: Patient's entire family of origin has , she now has a sister-in-law and  as support.  Relationship with  is conflictual. She is recovering from surgeries  PROMIS (reviewed every 90 days): PROMIS-10 Scores  PROMIS 10-Global Health (only subscores and total score):   PROMIS-10 Scores Only 2021 3/15/2022 3/15/2022 3/15/2022 3/24/2022 2022 2022   Global Mental Health Score 6 6 6 6 8 12 12   Global Physical Health Score 9 9 9 9 8 11 10   PROMIS TOTAL - SUBSCORES 15 15 15 15 16 23 22       Referral / Collaboration:  Referral to another professional/service is not indicated at this time..    Anticipated number of session for this episode of care: 50  Anticipation frequency of session:  "Biweekly  Anticipated Duration of each session: 53 or more minutes due to intensity of trauma symptoms  Treatment plan will be reviewed in 90 days or when goals have been changed.   There has been demonstrated improvement in functioning while patient has been engaged in psychotherapy/psychological service- if withdrawn the patient would deteriorate and/or relapse.       MeasurableTreatment Goal(s) related to diagnosis / functional impairment(s)  Goal 1: Patient will \"jumpstart, getting going with the things I need to be doing around the house as far as picking up, doing things, trying to do something every day.  Also to lessen the animosity between me and my .\"    I will know I've met my goal when my shoulders are fixed and I can see.      Objective #A (Patient Action)    Patient will increase frequency of engaging her in ADLs.  Status: Continued - Date(s): 12/10/21, 3/9/22, 6/9/22    Intervention(s)  Therapist will engage patient in CBT, specifically behavioral activation.    Objective #B  Patient will  track and record at least 5 pleasant exchanges with . Patient will be able to identify at least 5 positive traits about her  and how he relates to her.  Status: Continued - Date(s): 12/10/21, 3/9/22, 6/9/22    Intervention(s)  Therapist will teach assertiveness skills and assign homework related to relationship interactions.    Objective #C  Patient will reduce level of depressive and anxious features as evidenced by reduction in score on her CHAVO-7 and PHQ-9 (scores of 15 and 16 at first measurment, respectively).  Status: Continued - Date(s): 12/10/21, 3/9/22, 6/9/22    Intervention(s)  Therapist will engage patient in person-centered therapy and CBT.    Patient has reviewed and agreed to the above plan.      MICAELA SLADE  June 9, 2022                                                   Randi Cleary          SAFETY PLAN:  Step 1: Warning signs / cues (Thoughts, images, mood, situation, " "behavior) that a crisis may be developing:  ? Thoughts: \"I don't want to continue\" \"I am unwanted\"  ? Images: none  ? Thinking Processes: ruminating  ? Mood: anger  ? Behaviors: isolating/withdrawing , can't stop crying, not taking care of myself and not taking care of my responsibilities  ? Situations: small triggers, such as not being able to find something, or dropping something   Step 2: Coping strategies - Things I can do to take my mind off of my problems without contacting another person (relaxation technique, physical activity):  ? Distress Tolerance Strategies:  arts and crafts: drawing, play with my pet , listen to positive and upbeat music: any, change body temperature (ice pack/cold water)  and paced breathing/progressive muscle relaxation  ? Physical Activities: go for a walk, deep breathing and stretching   ? Focus on helpful thoughts:  \"You've been through this before, you can get through it again.\"  Step 3: People and social settings that provide distraction:                 Name: Carmen                            Name: Darien                           Name: Aleida       ? pool, shopping, Carmen's house, [employer]       Step 4: Remind myself of people and things that are important to me and worth living for:  Clifford Little Donna, post-COVID world, options of what could be in your future        Step 5: When I am in crisis, I can ask these people to help me use my safety plan:                 Name: Sidney  Step 6: Making the environment safe:   ? go to sleep/daydream  Step 7: Professionals or agencies I can contact during a crisis:  ? [facility] Number: [phone]  ? Suicide Prevention Lifeline: 2-590-284-RPHY (8187)  ? Crisis Text Line Service (available 24 hours a day, 7 days a week): Text [place] to 727198    Local Crisis Services: [facility] Crisis: [phone]     Call 911 or go to my nearest emergency department.       I helped develop this safety plan and agree to use it " when needed.  I have been given a copy of this plan.       Client signature _________________________________________________________________  Today s date:  11/24/2020  Adapted from Safety Plan Template 2008 Randi Poole and Robby Barba is reprinted with the express permission of the authors.  No portion of the Safety Plan Template may be reproduced without the express, written permission.  You can contact the authors at [email or web address] or [email or web address].

## 2022-08-12 ENCOUNTER — VIRTUAL VISIT (OUTPATIENT)
Dept: PALLIATIVE MEDICINE | Facility: OTHER | Age: 67
End: 2022-08-12
Attending: ANESTHESIOLOGY
Payer: MEDICARE

## 2022-08-12 DIAGNOSIS — G95.20 CORD COMPRESSION (H): ICD-10-CM

## 2022-08-12 DIAGNOSIS — G25.81 RESTLESS LEGS SYNDROME (RLS): ICD-10-CM

## 2022-08-12 PROCEDURE — 99214 OFFICE O/P EST MOD 30 MIN: CPT | Mod: 95 | Performed by: ANESTHESIOLOGY

## 2022-08-12 RX ORDER — HYDROCODONE BITARTRATE AND ACETAMINOPHEN 5; 325 MG/1; MG/1
1 TABLET ORAL 3 TIMES DAILY PRN
Qty: 70 TABLET | Refills: 0 | Status: SHIPPED | OUTPATIENT
Start: 2022-08-12 | End: 2022-09-26

## 2022-08-12 RX ORDER — ROPINIROLE 2 MG/1
2-4 TABLET, FILM COATED ORAL AT BEDTIME
Qty: 120 TABLET | Refills: 3 | Status: SHIPPED | OUTPATIENT
Start: 2022-08-12 | End: 2023-01-18

## 2022-08-12 ASSESSMENT — ENCOUNTER SYMPTOMS
FEVER: 0
SORE THROAT: 0
MEMORY LOSS: 0
DECREASED APPETITE: 1
WEIGHT GAIN: 1
HEMOPTYSIS: 0
FATIGUE: 1
HYPERTENSION: 0
BOWEL INCONTINENCE: 0
HALLUCINATIONS: 0
PALPITATIONS: 0
HEARTBURN: 1
SMELL DISTURBANCE: 0
DOUBLE VISION: 0
NIGHT SWEATS: 1
VOMITING: 0
SLEEP DISTURBANCES DUE TO BREATHING: 0
TROUBLE SWALLOWING: 0
SYNCOPE: 0
CONSTIPATION: 0
NAIL CHANGES: 0
BACK PAIN: 1
EXERCISE INTOLERANCE: 1
DYSPNEA ON EXERTION: 1
WEAKNESS: 1
TREMORS: 0
EYE PAIN: 0
NAUSEA: 0
POLYPHAGIA: 0
ORTHOPNEA: 0
HEADACHES: 1
TINGLING: 1
FLANK PAIN: 0
EYE WATERING: 0
BREAST MASS: 0
WEIGHT LOSS: 0
DISTURBANCES IN COORDINATION: 1
COUGH: 1
ABDOMINAL PAIN: 0
INSOMNIA: 1
SEIZURES: 0
COUGH DISTURBING SLEEP: 1
SNORES LOUDLY: 1
LEG PAIN: 0
POSTURAL DYSPNEA: 1
JAUNDICE: 0
DYSURIA: 0
POLYDIPSIA: 0
HOARSE VOICE: 1
INCREASED ENERGY: 1
BLOOD IN STOOL: 0
STIFFNESS: 1
LOSS OF CONSCIOUSNESS: 0
PARALYSIS: 0
WHEEZING: 1
DECREASED CONCENTRATION: 1
MUSCLE WEAKNESS: 1
EYE REDNESS: 0
CHILLS: 0
SHORTNESS OF BREATH: 1
BLOATING: 0
NECK PAIN: 1
DIFFICULTY URINATING: 0
SPUTUM PRODUCTION: 1
ARTHRALGIAS: 1
LIGHT-HEADEDNESS: 1
MYALGIAS: 1
TASTE DISTURBANCE: 0
DIARRHEA: 0
RECTAL PAIN: 0
SINUS CONGESTION: 1
NUMBNESS: 1
DIZZINESS: 1
JOINT SWELLING: 0
ALTERED TEMPERATURE REGULATION: 1
PANIC: 1
HYPOTENSION: 0
EYE IRRITATION: 0
DEPRESSION: 1
SKIN CHANGES: 0
NECK MASS: 0
POOR WOUND HEALING: 0
NERVOUS/ANXIOUS: 1
SINUS PAIN: 0
MUSCLE CRAMPS: 0
SPEECH CHANGE: 0
HEMATURIA: 0
BREAST PAIN: 1

## 2022-08-12 ASSESSMENT — PAIN SCALES - PAIN ENJOYMENT GENERAL ACTIVITY SCALE (PEG)
AVG_PAIN_PASTWEEK: 8
PEG_TOTALSCORE: 8.67
INTERFERED_ENJOYMENT_LIFE: 9
INTERFERED_GENERAL_ACTIVITY: 9

## 2022-08-12 ASSESSMENT — PAIN SCALES - GENERAL: PAINLEVEL: EXTREME PAIN (8)

## 2022-08-12 NOTE — NURSING NOTE
Winnie is a 66 year old who is being evaluated via a billable video visit.      How would you like to obtain your AVS? [employer]  If the video visit is dropped, the invitation should be resent by: Text to cell phone: [phone]  Will anyone else be joining your video visit? No    Platform used for Video Visit: Doximity  PEG Score 4/6/2022 6/22/2022 8/12/2022   PEG Total Score 6.67 7.67 8.33     [facility] Number:  [phone]    Call with any questions about your care and for scheduling assistance.     Calls are returned Monday through Friday between 8 AM and 4:30 PM. We usually get back to you within 2 business days depending on the issue/request.    If we are prescribing your medications:    For opioid medication refills, call the clinic or send a [employer] message 7 days in advance.  Please include:    Name of requested medication    Name of the pharmacy.    For non-opioid medications, call your pharmacy directly to request a refill. Please allow 3-4 days to be processed.     Per [place] State Law:    All controlled substance prescriptions must be filled within 30 days of being written.      For those controlled substances allowing refills, pickup must occur within 30 days of last fill.      We believe regular attendance is key to your success in our program!      Any time you are unable to keep your appointment we ask that you call us at least 24 hours in advance to cancel.This will allow us to offer the appointment time to another patient.     Multiple missed appointments may lead to dismissal from the clinic.

## 2022-08-12 NOTE — PROGRESS NOTES
[facility] - Office Visit    ASSESSMENT & PLAN     Randi was seen today for pain.    Diagnoses and all orders for this visit:    Cord compression (H)  -     HYDROcodone-acetaminophen (NORCO) 5-325 MG tablet; Take 1 tablet by mouth 3 times daily as needed for breakthrough pain or moderate to severe pain May fill 8/24 for 8/26  -     PAIN INJECTION EVAL/TREAT/FOLLOW UP; Future    Restless legs syndrome (RLS)  -     rOPINIRole (REQUIP) 2 MG tablet; Take 1-2 tablets (2-4 mg) by mouth At Bedtime        Patient Instructions   PLAN:    You are rescheduled for an occipital nerve block to help with the pain from your neck that comes over the top of the head behind your eyes.    You will schedule with [facility] to assess your wrist and carpal tunnel symptoms as able.    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    You will follow with [facility] to assess for your shoulders and surgery as able.    Continue working with your psychotherapist.    Continue hydrocodone 5 mg up to 3 times a day.    Follow-up with Dr. Wiggins in the office in 3 months        -----  AXEL WIGGINS MD  [facility]       SUBJECTIVE      Randi Cleary is a 66 year old year old female who presents to clinic today for the following:     Follow-up for mood disorder, history of cervical surgery.    She described being very frustrated that she is not getting better.  She has pain in her neck, wrists, shoulders.  She is trying to go to the pool though her neck and flares up, can have headaches worse behind her eyes.    She describes try to help her sister-in-law who is getting , notes is hard when she goes out with shops for her to stand and be as active.  She refuses to get a wheelchair.    She did cancel her last follow-up appoint with Dr. Gregory to review the neck surgery, hoping to have more time.    She is not sure if she got a call about the occipital nerve blocks we discussed last  "time.    She has not yet been back to [facility] to assess the wrist with things going on, did have a procedure in June.    She has been going to [facility] for physical therapy for her shoulders and completed.  She notes they have considered surgery on her shoulders but she wants to get a little further distance from her neck surgery also mentions co-pay.    She had also been scheduled for foot surgery more than a year ago, got COVID.    She reviews chaos at home, the water heater went out last night.  She also canceled this appointment reported there was a \"family emergency\" by this she reviews having conflict with her  and did not want to bring her in today.    She continues actively working with her psychotherapist.    Concerned she may be having some increased neuropathy in her feet and some has problems with restless legs.  She has been taking Requip.  There was a problem with whether she had overused 12 tablets.    She continues with medical cannabis which helps some extent with pain and agitation.  She has gotten the bud form and finds that by smoking it bothers her asthma and will learn to put into food.    Last seen there was a rash on her chest, it seems to be still there, is not itching, did see her primary care provider.    She does use the hydrocodone 5 mg twice a day and sometimes takes another 1 after being in the pool.  She does not want to rely upon it.   reviewed.  Last urine drug test in December.    She continues on the oxcarbazepine 150 mg at bedtime and lithium orotate, notes she has some good days and bad.    Has had phlebotomy due to her hematochromatosis, clear how might relate to her restless legs.  Needs to get a mammogram      Current Outpatient Medications:      acetaminophen (TYLENOL) 325 MG tablet, Take 2 tablets (650 mg) by mouth every 4 hours as needed for other (For optimal non-opioid multimodal pain management to improve pain control.), Disp: , Rfl:      " albuterol (PROVENTIL) (2.5 MG/3ML) 0.083% neb solution, Take 1 vial (2.5 mg) by nebulization every 4 hours as needed for shortness of breath / dyspnea or wheezing, Disp: 360 mL, Rfl: 5     albuterol (VENTOLIN HFA) 108 (90 Base) MCG/ACT inhaler, Inhale 2 puffs into the lungs every 6 hours, Disp: 18 g, Rfl: 11     benzonatate (TESSALON) 200 MG capsule, Take 1 capsule (200 mg) by mouth 3 times daily as needed for cough, Disp: 20 capsule, Rfl: 1     cetirizine (ZYRTEC) 10 MG tablet, Take 1 tablet (10 mg) by mouth daily, Disp: 30 tablet, Rfl: 1     Cholecalciferol (VITAMIN D3) 250 MCG (86029 UT) TABS, Take 1 tablet by mouth daily 25177/day, Disp: , Rfl:      Cyanocobalamin (VITAMIN B-12) 5000 MCG SUBL, Place 2-3 sprays under the tongue daily Unknown dose. 2 or 3 sprays/day, Disp: , Rfl:      ethacrynic acid (EDECRIN) 25 MG tablet, Take 1 tablet by mouth on Saturday and Wednesday, Disp: 30 tablet, Rfl: 11     Fluticasone-Umeclidin-Vilanterol (TRELEGY ELLIPTA) 200-62.5-25 MCG/INH oral inhaler, Inhale 1 puff into the lungs daily, Disp: 1 each, Rfl: 11     guaiFENesin (MUCINEX) 600 MG 12 hr tablet, Take 1 tablet (600 mg) by mouth 2 times daily, Disp: 60 tablet, Rfl: 2     HYDROcodone-acetaminophen (NORCO) 5-325 MG tablet, Take 1 tablet by mouth 3 times daily as needed for breakthrough pain or moderate to severe pain May fill 8/24 for 8/26, Disp: 70 tablet, Rfl: 0     hydrOXYzine (ATARAX) 25 MG tablet, Take 1 tablet by mouth daily as needed, Disp: , Rfl:      LITHIUM PO, Take 25 mg by mouth daily OTC lithium oratate, Disp: , Rfl:      losartan (COZAAR) 25 MG tablet, Take 1 tablet (25 mg) by mouth daily, Disp: 90 tablet, Rfl: 2     magnesium 250 MG tablet, Take 1 tablet by mouth 2 times daily, Disp: , Rfl:      medical cannabis (Patient's own supply), See Admin Instructions (The purpose of this order is to document that the patient reports taking medical cannabis.  This is not a prescription, and is not used to certify that  the patient has a qualifying medical condition.) Ranjana, Disp: , Rfl:      methocarbamol (ROBAXIN) 500 MG tablet, Take 1 tablet (500 mg) by mouth 4 times daily Wean down on your frequency. - May fill/start 3/31/22, Disp: 112 tablet, Rfl: 3     omeprazole (PRILOSEC) 20 MG DR capsule, Take 1 capsule (20 mg) by mouth daily, Disp: 90 capsule, Rfl: 3     OXcarbazepine (TRILEPTAL) 150 MG tablet, Take one tablet daily at bedtime, Disp: 30 tablet, Rfl: 3     potassium chloride ER (KLOR-CON M) 20 MEQ CR tablet, Take 1 tablet (20 mEq) by mouth daily, Disp: 90 tablet, Rfl: 3     predniSONE (DELTASONE) 20 MG tablet, Take 2 tabs daily for 4 days One tab daily for 4 days Half tab daily for 4 days, Disp: 14 tablet, Rfl: 0     rOPINIRole (REQUIP) 2 MG tablet, Take 1-2 tablets (2-4 mg) by mouth At Bedtime, Disp: 120 tablet, Rfl: 3     SYNTHROID 150 MCG tablet, Take 1 tablet (150 mcg) by mouth daily, Disp: 90 tablet, Rfl: 1     vitamin E 400 units TABS, Take 800 Units by mouth daily, Disp: , Rfl:       Review of Systems   General, psych, musculoskeletal, bowels and bladder otherwise normal other than above.          OBJECTIVE         Physical Exam  General:  Normal appearance, no apparent distress  Cardiovascular: Normal rate  Lungs: Pulmonary effort is normal, speaking in full sentence. No concerning rashes or lesions.  Neurologic: No focal deficit, alert and oriented x3  Psychiatric: Clear sensorium.  Tearful at times.  Speech is normal rate and rhythm.  Appeared a bit more composed by the end of the session.    Assessment: Mood disorder, question bipolar spectrum component, significant psychosocial stressors and medical problems, actively working with her psychotherapist.    Reviews multiple pain generators, feeling overwhelmed.    We reviewed a simple place to start baby to have the occipital nerve blocks that she complains about the headaches going over behind her eyes related to her neck.    Plan as above.    Total time more  than 30 minutes.      DO start time 9: 05.  Video end time 9: 28.  Patient at home via Doximity.

## 2022-08-12 NOTE — PROGRESS NOTES
08/12/22 0840   PEG: A Thee-Item Scale Assessing Pain Intensity and Interference        0 = No pain / No interference    10 = Pain as bad as you can imagine / Completely interferes   What number best describes your pain on average in the past week? 7   What number best describes how, during the past week, pain has interfered with your enjoyment of life? 9   What number best describes how, during the past week, pain has interfered with your general activity? 9   PEG Total Score 8.33

## 2022-08-12 NOTE — PATIENT INSTRUCTIONS
PLAN:    You are rescheduled for an occipital nerve block to help with the pain from your neck that comes over the top of the head behind your eyes.    You will schedule with [facility] to assess your wrist and carpal tunnel symptoms as able.    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    You will follow with [facility] to assess for your shoulders and surgery as able.    Continue working with your psychotherapist.    Continue hydrocodone 5 mg up to 3 times a day.    Follow-up with Dr. Dubois in the office in 3 months

## 2022-08-15 ENCOUNTER — ANCILLARY PROCEDURE (OUTPATIENT)
Dept: MAMMOGRAPHY | Facility: CLINIC | Age: 67
End: 2022-08-15
Attending: INTERNAL MEDICINE
Payer: MEDICARE

## 2022-08-15 DIAGNOSIS — N64.51 BREAST INDURATION: ICD-10-CM

## 2022-08-15 PROCEDURE — G0279 TOMOSYNTHESIS, MAMMO: HCPCS

## 2022-08-15 PROCEDURE — 77066 DX MAMMO INCL CAD BI: CPT

## 2022-08-16 ENCOUNTER — VIRTUAL VISIT (OUTPATIENT)
Dept: PSYCHOLOGY | Facility: CLINIC | Age: 67
End: 2022-08-16
Payer: MEDICARE

## 2022-08-16 ENCOUNTER — TELEPHONE (OUTPATIENT)
Dept: PALLIATIVE MEDICINE | Facility: CLINIC | Age: 67
End: 2022-08-16

## 2022-08-16 DIAGNOSIS — F31.81 BIPOLAR 2 DISORDER (H): Primary | ICD-10-CM

## 2022-08-16 DIAGNOSIS — F41.1 GENERALIZED ANXIETY DISORDER: ICD-10-CM

## 2022-08-16 DIAGNOSIS — F43.9 TRAUMA AND STRESSOR-RELATED DISORDER: ICD-10-CM

## 2022-08-16 PROCEDURE — 90837 PSYTX W PT 60 MINUTES: CPT | Mod: 95 | Performed by: SOCIAL WORKER

## 2022-08-16 NOTE — TELEPHONE ENCOUNTER
Please contact Winnie and see if they went over her breast ultrasound results.  The radiologist is recommending a biopsy.  If she comfortable with me proceeding with ordering this for her?

## 2022-08-16 NOTE — TELEPHONE ENCOUNTER
"Contacted patient who is reluctantly willing to go through with biopsy.  She has this scheduled for 9/7 and is seeing PCP on 8/31.  Patient states \"I was going to talk to Dr. Stockton prior to the biopsy to make sure she agrees.\"  Writer informed patient that PCP will be updated.  "

## 2022-08-16 NOTE — TELEPHONE ENCOUNTER
Order received:Occipital nerve block, headaches, [place]      Please schedule       Odilia Camarillo    [facility]

## 2022-08-16 NOTE — PROGRESS NOTES
[facility]                                     Progress Note    Patient Name: Randi Cleary  Date: 8/16/22         Service Type: Individual     Session Start Time: 3:00 PM  Session End Time: 3:55 PM     Session Length: 55 minutes    Session #: 129    Attendees: Client attended alone    Service Modality:  Video Visit:      Provider verified identity through the following two step process.  Patient provided:  Patient is known previously to provider    Telemedicine Visit: The patient's condition can be safely assessed and treated via synchronous audio and visual telemedicine encounter.      Reason for Telemedicine Visit: Patient convenience (e.g. access to timely appointments / distance to available provider)    Originating Site (Patient Location): Patient's home    Distant Site (Provider Location): Provider Remote Setting- Home Office    Consent:  The patient/guardian has verbally consented to: the potential risks and benefits of telemedicine (video visit) versus in person care; bill my insurance or make self-payment for services provided; and responsibility for payment of non-covered services.     Patient would like the video invitation sent by:  My Chart    Mode of Communication:  Video Conference via Amwell    As the provider I attest to compliance with applicable laws and regulations related to telemedicine.          DATA  Extended Session (53+ minutes): PROLONGED SERVICE IN THE OUTPATIENT SETTING REQUIRING DIRECT (FACE-TO-FACE) PATIENT CONTACT BEYOND THE USUAL SERVICE:    - Treatment protocol required additional time to complete, due to the nature of diagnosis being treated.  See Interventions section for details  Interactive Complexity: No  Crisis: No        Progress Since Last Session (Related to Symptoms / Goals / Homework):   Symptoms: Improving this week is a better week than last week, which was rough on her physically and therefore emotionally    Homework: Progress made  Look at  debt payoff  Make a plan for the money you received     Episode of Care Goals: Satisfactory progress - ACTION (Actively working towards change); Intervened by reinforcing change plan / affirming steps taken     Current / Ongoing Stressors and Concerns:   Patient is currently socially isolated. She has a conflictual relationship with her .  She is getting minimal physical activity.  She had recent eye surgery and shoulder surgery.     Treatment Objective(s) Addressed in This Session:   Patient will increase frequency of engaging her in ADLs.  Patient will track and record at least 5 pleasant exchanges with . Patient will be able to identify at least 5 positive traits about her .  Patient will reduce level of depressive and anxious features as evidenced by reduction in score on her CHAVO-7 and PHQ-9 (scores of 15 and 16 at first measurment, respectively).     Intervention:   Supportive Therapy: Talked through challenges with her neck pain and exercise. Looked at next steps to get this support. Talked about stressors in her relationship. Also processed the recent mammogram results and how she has to get a biopsy, looking at emotions around this. Talked about how all of this happening added up and led to a intrusive thought of wishing she were dead, which left shortly after it started. Processed what it was like to have this thought show up again after so long of it not appearing. Reviewed that it didn't move into the point of having a plan or intent, and that she has her safety plan if this were to occur.       The following assessments were completed by patient for this visit:  PHQ9: of note, patient reported she was in a hurry and didn't read all questions, she reports no suicidal ideation  PHQ-9 SCORE 6/24/2022 6/28/2022 6/30/2022 7/14/2022 7/26/2022 8/9/2022 8/16/2022   PHQ-9 Total Score MyChart 14 (Moderate depression) 14 (Moderate depression) 17 (Moderately severe depression) 15 (Moderately  severe depression) 13 (Moderate depression) 16 (Moderately severe depression) 15 (Moderately severe depression)   PHQ-9 Total Score 14 14 17 15 13 16 15   Some encounter information is confidential and restricted. Go to Review Flowsheets activity to see all data.     GAD7:   CHAVO-7 SCORE 5/10/2022 6/9/2022 6/24/2022 7/14/2022 7/14/2022 7/14/2022 8/9/2022   Total Score 18 (severe anxiety) 14 (moderate anxiety) 16 (severe anxiety) - - 13 (moderate anxiety) 16 (severe anxiety)   Total Score 18 14 16 13 13 13 16   Some encounter information is confidential and restricted. Go to Review Flowsheets activity to see all data.     PROMIS 10-Global Health (only subscores and total score):   PROMIS-10 Scores Only 12/14/2021 3/15/2022 3/15/2022 3/15/2022 3/24/2022 4/1/2022 6/9/2022   Global Mental Health Score 6 6 6 6 8 12 12   Global Physical Health Score 9 9 9 9 8 11 10   PROMIS TOTAL - SUBSCORES 15 15 15 15 16 23 22        ASSESSMENT: Current Emotional / Mental Status (status of significant symptoms):   Risk status (Self / Other harm or suicidal ideation)   Patient denies current fears or concerns for personal safety.   Patient reports the following current or recent suicidal ideation or behaviors: intrusive thought of passive ideation.   Patient denies current or recent homicidal ideation or behaviors.   Patient denies current or recent self injurious behavior or ideation.   Patient denies other safety concerns.   Patient reports there has been no change in risk factors since their last session.     Patient reports there has been no change in protective factors since their last session.     A safety and risk management plan has been developed including: Patient consented to co-developed safety plan on 11/24/2020.  Safety and risk management plan was reviewed.   Patient agreed to use safety plan should any safety concerns arise.  A copy was made available to the patient.     Appearance:   Appropriate    Eye Contact:   Good     Psychomotor Behavior: Normal    Attitude:   Cooperative    Orientation:   All   Speech    Rate / Production: Normal/ Responsive    Volume:  Normal    Mood:    Normal   Affect:    Appropriate    Thought Content:  Clear    Thought Form:  Coherent    Insight:    Good      Medication Review:   No changes to current psychiatric medication(s)     Medication Compliance:   Yes     Changes in Health Issues:   None reported     Chemical Use Review:   Substance Use: Chemical use reviewed, no active concerns identified Nothing used since 2021.     Tobacco Use: No current tobacco use.      Diagnosis:  1. Bipolar 2 disorder (H)    2. Generalized anxiety disorder    3. Trauma and stressor-related disorder      Collateral Reports Completed:   Not Applicable    PLAN: (Patient Tasks / Therapist Tasks / Other)  Look at debt payoff  Make a plan for the money you received      There has been demonstrated improvement in functioning while patient has been engaged in psychotherapy/psychological service- if withdrawn the patient would deteriorate and/or relapse.     MICAELA SLADE   2022                                                        ______________________________________________________________________    Individual Treatment Plan    Patient's Name: Randi Cleary  YOB: 1955    Date of Creation: 20  Date Treatment Plan Last Reviewed/Revised: 22    DSM5 Diagnoses: 296.89 Bipolar II Disorder Depressed, 300.02 (F41.1) Generalized Anxiety Disorder or Adjustment Disorders  309.89 (F43.8) Other Specified Trauma and Stressor Related Disorder  Psychosocial / Contextual Factors: Patient's entire family of origin has , she now has a sister-in-law and  as support.  Relationship with  is conflictual. She is recovering from surgeries  PROMIS (reviewed every 90 days): PROMIS-10 Scores  PROMIS 10-Global Health (only subscores and total score):   PROMIS-10 Scores Only 2021  "3/15/2022 3/15/2022 3/15/2022 3/24/2022 4/1/2022 6/9/2022   Global Mental Health Score 6 6 6 6 8 12 12   Global Physical Health Score 9 9 9 9 8 11 10   PROMIS TOTAL - SUBSCORES 15 15 15 15 16 23 22       Referral / Collaboration:  Referral to another professional/service is not indicated at this time..    Anticipated number of session for this episode of care: 50  Anticipation frequency of session: Biweekly  Anticipated Duration of each session: 53 or more minutes due to intensity of trauma symptoms  Treatment plan will be reviewed in 90 days or when goals have been changed.   There has been demonstrated improvement in functioning while patient has been engaged in psychotherapy/psychological service- if withdrawn the patient would deteriorate and/or relapse.       MeasurableTreatment Goal(s) related to diagnosis / functional impairment(s)  Goal 1: Patient will \"jumpstart, getting going with the things I need to be doing around the house as far as picking up, doing things, trying to do something every day.  Also to lessen the animosity between me and my .\"    I will know I've met my goal when my shoulders are fixed and I can see.      Objective #A (Patient Action)    Patient will increase frequency of engaging her in ADLs.  Status: Continued - Date(s): 12/10/21, 3/9/22, 6/9/22    Intervention(s)  Therapist will engage patient in CBT, specifically behavioral activation.    Objective #B  Patient will  track and record at least 5 pleasant exchanges with . Patient will be able to identify at least 5 positive traits about her  and how he relates to her.  Status: Continued - Date(s): 12/10/21, 3/9/22, 6/9/22    Intervention(s)  Therapist will teach assertiveness skills and assign homework related to relationship interactions.    Objective #C  Patient will reduce level of depressive and anxious features as evidenced by reduction in score on her CHAVO-7 and PHQ-9 (scores of 15 and 16 at first measurment, " "respectively).  Status: Continued - Date(s): 12/10/21, 3/9/22, 6/9/22    Intervention(s)  Therapist will engage patient in person-centered therapy and CBT.    Patient has reviewed and agreed to the above plan.      MICAELA SLADE  June 9, 2022                                                   Randi Cleary          SAFETY PLAN:  Step 1: Warning signs / cues (Thoughts, images, mood, situation, behavior) that a crisis may be developing:  ? Thoughts: \"I don't want to continue\" \"I am unwanted\"  ? Images: none  ? Thinking Processes: ruminating  ? Mood: anger  ? Behaviors: isolating/withdrawing , can't stop crying, not taking care of myself and not taking care of my responsibilities  ? Situations: small triggers, such as not being able to find something, or dropping something   Step 2: Coping strategies - Things I can do to take my mind off of my problems without contacting another person (relaxation technique, physical activity):  ? Distress Tolerance Strategies:  arts and crafts: drawing, play with my pet , listen to positive and upbeat music: any, change body temperature (ice pack/cold water)  and paced breathing/progressive muscle relaxation  ? Physical Activities: go for a walk, deep breathing and stretching   ? Focus on helpful thoughts:  \"You've been through this before, you can get through it again.\"  Step 3: People and social settings that provide distraction:                 Name: Carmen                            Name: Darien                           Name: Aleida       ? pool, shopping, Carmen's house, [employer]       Step 4: Remind myself of people and things that are important to me and worth living for:  Clifford Little Donna, post-COVID world, options of what could be in your future        Step 5: When I am in crisis, I can ask these people to help me use my safety plan:                 Name: Sidney  Step 6: Making the environment safe:   ? go to sleep/daydream  Step 7: Professionals or agencies I can " contact during a crisis:  ? [facility] Number: [phone]  ? Suicide Prevention Lifeline: 1-799-480-ZMTV (0572)  ? Crisis Text Line Service (available 24 hours a day, 7 days a week): Text [place] to 741663    Local Crisis Services: [facility]: [phone]     Call 911 or go to my nearest emergency department.       I helped develop this safety plan and agree to use it when needed.  I have been given a copy of this plan.       Client signature _________________________________________________________________  Today s date:  11/24/2020  Adapted from Safety Plan Template 2008 Randi Poole and Robby Barba is reprinted with the express permission of the authors.  No portion of the Safety Plan Template may be reproduced without the express, written permission.  You can contact the authors at [email or web address] or [email or web address].

## 2022-08-18 ENCOUNTER — VIRTUAL VISIT (OUTPATIENT)
Dept: PSYCHOLOGY | Facility: CLINIC | Age: 67
End: 2022-08-18
Payer: MEDICARE

## 2022-08-18 DIAGNOSIS — F41.1 GENERALIZED ANXIETY DISORDER: ICD-10-CM

## 2022-08-18 DIAGNOSIS — F31.81 BIPOLAR 2 DISORDER (H): Primary | ICD-10-CM

## 2022-08-18 DIAGNOSIS — F43.9 TRAUMA AND STRESSOR-RELATED DISORDER: ICD-10-CM

## 2022-08-18 PROCEDURE — 90834 PSYTX W PT 45 MINUTES: CPT | Mod: 95 | Performed by: SOCIAL WORKER

## 2022-08-18 NOTE — PROGRESS NOTES
[facility]                                     Progress Note    Patient Name: Randi Cleary  Date: 8/18/22         Service Type: Individual     Session Start Time: 10:32 AM  Session End Time: 11:20 AM     Session Length: 48 minutes    Session #: 130    Attendees: Client attended alone    Service Modality:  Video Visit:      Provider verified identity through the following two step process.  Patient provided:  Patient is known previously to provider    Telemedicine Visit: The patient's condition can be safely assessed and treated via synchronous audio and visual telemedicine encounter.      Reason for Telemedicine Visit: Patient convenience (e.g. access to timely appointments / distance to available provider)    Originating Site (Patient Location): Patient's home    Distant Site (Provider Location): Provider Remote Setting- Home Office    Consent:  The patient/guardian has verbally consented to: the potential risks and benefits of telemedicine (video visit) versus in person care; bill my insurance or make self-payment for services provided; and responsibility for payment of non-covered services.     Patient would like the video invitation sent by:  My Chart    Mode of Communication:  Video Conference via Amwell    As the provider I attest to compliance with applicable laws and regulations related to telemedicine.          DATA  Extended Session (53+ minutes): No  Interactive Complexity: No  Crisis: No        Progress Since Last Session (Related to Symptoms / Goals / Homework):   Symptoms: No change since earlier in the week    Homework: Progress made  Look at debt payoff  Make a plan for the money you received     Episode of Care Goals: Satisfactory progress - ACTION (Actively working towards change); Intervened by reinforcing change plan / affirming steps taken     Current / Ongoing Stressors and Concerns:   Patient is currently socially isolated. She has a conflictual relationship with  her .  She is getting minimal physical activity.  She had recent eye surgery and shoulder surgery.     Treatment Objective(s) Addressed in This Session:   Patient will increase frequency of engaging her in ADLs.  Patient will track and record at least 5 pleasant exchanges with . Patient will be able to identify at least 5 positive traits about her .  Patient will reduce level of depressive and anxious features as evidenced by reduction in score on her CHAVO-7 and PHQ-9 (scores of 15 and 16 at first measurment, respectively).     Intervention:   Supportive Therapy: Processed changes in her life that have been occurring, including the increase in mood changes. Talked about how she's coping with them and how they worry her. Discussed ways to communicate these with her , to ask for space when she needs it. Processed upcoming biopsy.      The following assessments were completed by patient for this visit:  PHQ9: of note, patient reported she was in a hurry and didn't read all questions, she reports no suicidal ideation  PHQ-9 SCORE 6/24/2022 6/28/2022 6/30/2022 7/14/2022 7/26/2022 8/9/2022 8/16/2022   PHQ-9 Total Score MyChart 14 (Moderate depression) 14 (Moderate depression) 17 (Moderately severe depression) 15 (Moderately severe depression) 13 (Moderate depression) 16 (Moderately severe depression) 15 (Moderately severe depression)   PHQ-9 Total Score 14 14 17 15 13 16 15   Some encounter information is confidential and restricted. Go to Review Blue Source activity to see all data.     GAD7:   CHAVO-7 SCORE 5/10/2022 6/9/2022 6/24/2022 7/14/2022 7/14/2022 7/14/2022 8/9/2022   Total Score 18 (severe anxiety) 14 (moderate anxiety) 16 (severe anxiety) - - 13 (moderate anxiety) 16 (severe anxiety)   Total Score 18 14 16 13 13 13 16   Some encounter information is confidential and restricted. Go to Review ipnexusheets activity to see all data.     PROMIS 10-Global Health (only subscores and total  score):   PROMIS-10 Scores Only 12/14/2021 3/15/2022 3/15/2022 3/15/2022 3/24/2022 4/1/2022 6/9/2022   Global Mental Health Score 6 6 6 6 8 12 12   Global Physical Health Score 9 9 9 9 8 11 10   PROMIS TOTAL - SUBSCORES 15 15 15 15 16 23 22        ASSESSMENT: Current Emotional / Mental Status (status of significant symptoms):   Risk status (Self / Other harm or suicidal ideation)   Patient denies current fears or concerns for personal safety.   Patient reports the following current or recent suicidal ideation or behaviors: intrusive thought of passive ideation.   Patient denies current or recent homicidal ideation or behaviors.   Patient denies current or recent self injurious behavior or ideation.   Patient denies other safety concerns.   Patient reports there has been no change in risk factors since their last session.     Patient reports there has been no change in protective factors since their last session.     A safety and risk management plan has been developed including: Patient consented to co-developed safety plan on 11/24/2020.  Safety and risk management plan was reviewed.   Patient agreed to use safety plan should any safety concerns arise.  A copy was made available to the patient.     Appearance:   Appropriate    Eye Contact:   Good    Psychomotor Behavior: Normal    Attitude:   Cooperative    Orientation:   All   Speech    Rate / Production: Normal/ Responsive    Volume:  Normal    Mood:    Normal   Affect:    Appropriate    Thought Content:  Clear    Thought Form:  Coherent    Insight:    Good      Medication Review:   No changes to current psychiatric medication(s)     Medication Compliance:   Yes     Changes in Health Issues:   None reported     Chemical Use Review:   Substance Use: Chemical use reviewed, no active concerns identified Nothing used since August 2021.     Tobacco Use: No current tobacco use.      Diagnosis:  1. Bipolar 2 disorder (H)    2. Generalized anxiety disorder    3. Trauma  and stressor-related disorder      Collateral Reports Completed:   Not Applicable    PLAN: (Patient Tasks / Therapist Tasks / Other)  Look at debt payoff  Make a plan for the money you received      There has been demonstrated improvement in functioning while patient has been engaged in psychotherapy/psychological service- if withdrawn the patient would deteriorate and/or relapse.     CRUZ MICAELA BAKER JO   2022                                                        ______________________________________________________________________    Individual Treatment Plan    Patient's Name: Randi Cleary  YOB: 1955    Date of Creation: 20  Date Treatment Plan Last Reviewed/Revised: 22    DSM5 Diagnoses: 296.89 Bipolar II Disorder Depressed, 300.02 (F41.1) Generalized Anxiety Disorder or Adjustment Disorders  309.89 (F43.8) Other Specified Trauma and Stressor Related Disorder  Psychosocial / Contextual Factors: Patient's entire family of origin has , she now has a sister-in-law and  as support.  Relationship with  is conflictual. She is recovering from surgeries  PROMIS (reviewed every 90 days): PROMIS-10 Scores  PROMIS 10-Global Health (only subscores and total score):   PROMIS-10 Scores Only 2021 3/15/2022 3/15/2022 3/15/2022 3/24/2022 2022 2022   Global Mental Health Score 6 6 6 6 8 12 12   Global Physical Health Score 9 9 9 9 8 11 10   PROMIS TOTAL - SUBSCORES 15 15 15 15 16 23 22       Referral / Collaboration:  Referral to another professional/service is not indicated at this time..    Anticipated number of session for this episode of care: 50  Anticipation frequency of session: Biweekly  Anticipated Duration of each session: 53 or more minutes due to intensity of trauma symptoms  Treatment plan will be reviewed in 90 days or when goals have been changed.   There has been demonstrated improvement in functioning while patient has been engaged in  "psychotherapy/psychological service- if withdrawn the patient would deteriorate and/or relapse.       MeasurableTreatment Goal(s) related to diagnosis / functional impairment(s)  Goal 1: Patient will \"jumpstart, getting going with the things I need to be doing around the house as far as picking up, doing things, trying to do something every day.  Also to lessen the animosity between me and my .\"    I will know I've met my goal when my shoulders are fixed and I can see.      Objective #A (Patient Action)    Patient will increase frequency of engaging her in ADLs.  Status: Continued - Date(s): 12/10/21, 3/9/22, 6/9/22    Intervention(s)  Therapist will engage patient in CBT, specifically behavioral activation.    Objective #B  Patient will  track and record at least 5 pleasant exchanges with . Patient will be able to identify at least 5 positive traits about her  and how he relates to her.  Status: Continued - Date(s): 12/10/21, 3/9/22, 6/9/22    Intervention(s)  Therapist will teach assertiveness skills and assign homework related to relationship interactions.    Objective #C  Patient will reduce level of depressive and anxious features as evidenced by reduction in score on her CHAVO-7 and PHQ-9 (scores of 15 and 16 at first measurment, respectively).  Status: Continued - Date(s): 12/10/21, 3/9/22, 6/9/22    Intervention(s)  Therapist will engage patient in person-centered therapy and CBT.    Patient has reviewed and agreed to the above plan.      MICAELA SLADE  June 9, 2022                                                   Randi Cleary          SAFETY PLAN:  Step 1: Warning signs / cues (Thoughts, images, mood, situation, behavior) that a crisis may be developing:  ? Thoughts: \"I don't want to continue\" \"I am unwanted\"  ? Images: none  ? Thinking Processes: ruminating  ? Mood: anger  ? Behaviors: isolating/withdrawing , can't stop crying, not taking care of myself and not taking care of " "my responsibilities  ? Situations: small triggers, such as not being able to find something, or dropping something   Step 2: Coping strategies - Things I can do to take my mind off of my problems without contacting another person (relaxation technique, physical activity):  ? Distress Tolerance Strategies:  arts and crafts: drawing, play with my pet , listen to positive and upbeat music: any, change body temperature (ice pack/cold water)  and paced breathing/progressive muscle relaxation  ? Physical Activities: go for a walk, deep breathing and stretching   ? Focus on helpful thoughts:  \"You've been through this before, you can get through it again.\"  Step 3: People and social settings that provide distraction:                 Name: Carmen                            Name: Darien                           Name: Aleida       ? pool, shopping, Carmen's house, [employer]       Step 4: Remind myself of people and things that are important to me and worth living for:  Clifford Little Donna, post-COVID world, options of what could be in your future        Step 5: When I am in crisis, I can ask these people to help me use my safety plan:                 Name: Sidney  Step 6: Making the environment safe:   ? go to sleep/daydream  Step 7: Professionals or agencies I can contact during a crisis:  ? Merged with [facility] Daytime Number: [phone]  ? Suicide Prevention Lifeline: 8-414-052-VIPD (1045)  ? Crisis Text Line Service (available 24 hours a day, 7 days a week): Text [place] to 998890    Local Crisis Services: [facility] Crisis: [phone]     Call 911 or go to my nearest emergency department.       I helped develop this safety plan and agree to use it when needed.  I have been given a copy of this plan.       Client signature _________________________________________________________________  Today s date:  11/24/2020  Adapted from Safety Plan Template 2008 Randi Poole and Robby Barba is reprinted with the " express permission of the authors.  No portion of the Safety Plan Template may be reproduced without the express, written permission.  You can contact the authors at [email or web address] or [email or web address].

## 2022-08-31 ENCOUNTER — OFFICE VISIT (OUTPATIENT)
Dept: INTERNAL MEDICINE | Facility: CLINIC | Age: 67
End: 2022-08-31
Payer: MEDICARE

## 2022-08-31 VITALS
SYSTOLIC BLOOD PRESSURE: 128 MMHG | WEIGHT: 241.1 LBS | DIASTOLIC BLOOD PRESSURE: 80 MMHG | BODY MASS INDEX: 38.91 KG/M2 | OXYGEN SATURATION: 95 % | HEART RATE: 94 BPM

## 2022-08-31 DIAGNOSIS — M54.2 CERVICAL SPINE PAIN: ICD-10-CM

## 2022-08-31 DIAGNOSIS — R68.84 PAIN IN BONE OF JAW: Primary | ICD-10-CM

## 2022-08-31 DIAGNOSIS — E89.0 POSTABLATIVE HYPOTHYROIDISM: ICD-10-CM

## 2022-08-31 DIAGNOSIS — J42 CHRONIC BRONCHITIS, UNSPECIFIED CHRONIC BRONCHITIS TYPE (H): ICD-10-CM

## 2022-08-31 DIAGNOSIS — I10 ESSENTIAL HYPERTENSION: ICD-10-CM

## 2022-08-31 DIAGNOSIS — N63.0 LUMP OR MASS IN BREAST: ICD-10-CM

## 2022-08-31 DIAGNOSIS — E11.9 TYPE 2 DIABETES MELLITUS WITHOUT COMPLICATION, WITHOUT LONG-TERM CURRENT USE OF INSULIN (H): ICD-10-CM

## 2022-08-31 LAB
BASOPHILS # BLD AUTO: 0 10E3/UL (ref 0–0.2)
BASOPHILS NFR BLD AUTO: 1 %
EOSINOPHIL # BLD AUTO: 0.1 10E3/UL (ref 0–0.7)
EOSINOPHIL NFR BLD AUTO: 2 %
ERYTHROCYTE [DISTWIDTH] IN BLOOD BY AUTOMATED COUNT: 12 % (ref 10–15)
HBA1C MFR BLD: 5.6 % (ref 0–5.6)
HCT VFR BLD AUTO: 48.3 % (ref 35–47)
HGB BLD-MCNC: 16.7 G/DL (ref 11.7–15.7)
IMM GRANULOCYTES # BLD: 0.1 10E3/UL
IMM GRANULOCYTES NFR BLD: 1 %
LYMPHOCYTES # BLD AUTO: 1.1 10E3/UL (ref 0.8–5.3)
LYMPHOCYTES NFR BLD AUTO: 17 %
MCH RBC QN AUTO: 33.3 PG (ref 26.5–33)
MCHC RBC AUTO-ENTMCNC: 34.6 G/DL (ref 31.5–36.5)
MCV RBC AUTO: 96 FL (ref 78–100)
MONOCYTES # BLD AUTO: 0.5 10E3/UL (ref 0–1.3)
MONOCYTES NFR BLD AUTO: 7 %
NEUTROPHILS # BLD AUTO: 4.7 10E3/UL (ref 1.6–8.3)
NEUTROPHILS NFR BLD AUTO: 72 %
PLATELET # BLD AUTO: 208 10E3/UL (ref 150–450)
RBC # BLD AUTO: 5.02 10E6/UL (ref 3.8–5.2)
WBC # BLD AUTO: 6.5 10E3/UL (ref 4–11)

## 2022-08-31 PROCEDURE — 80053 COMPREHEN METABOLIC PANEL: CPT | Performed by: INTERNAL MEDICINE

## 2022-08-31 PROCEDURE — 83036 HEMOGLOBIN GLYCOSYLATED A1C: CPT | Performed by: INTERNAL MEDICINE

## 2022-08-31 PROCEDURE — 99214 OFFICE O/P EST MOD 30 MIN: CPT | Performed by: INTERNAL MEDICINE

## 2022-08-31 PROCEDURE — 36415 COLL VENOUS BLD VENIPUNCTURE: CPT | Performed by: INTERNAL MEDICINE

## 2022-08-31 PROCEDURE — 85025 COMPLETE CBC W/AUTO DIFF WBC: CPT | Performed by: INTERNAL MEDICINE

## 2022-08-31 PROCEDURE — 84443 ASSAY THYROID STIM HORMONE: CPT | Performed by: INTERNAL MEDICINE

## 2022-08-31 RX ORDER — PREDNISONE 20 MG/1
TABLET ORAL
Qty: 14 TABLET | Refills: 0 | Status: SHIPPED | OUTPATIENT
Start: 2022-08-31 | End: 2022-08-31

## 2022-08-31 RX ORDER — BENZONATATE 200 MG/1
200 CAPSULE ORAL 3 TIMES DAILY PRN
Qty: 20 CAPSULE | Refills: 1 | Status: SHIPPED | OUTPATIENT
Start: 2022-08-31 | End: 2022-12-07

## 2022-08-31 RX ORDER — PREDNISONE 20 MG/1
TABLET ORAL
Qty: 14 TABLET | Refills: 0 | Status: CANCELLED | OUTPATIENT
Start: 2022-08-31

## 2022-08-31 RX ORDER — PREDNISONE 20 MG/1
TABLET ORAL
Qty: 14 TABLET | Refills: 1 | Status: SHIPPED | OUTPATIENT
Start: 2022-08-31 | End: 2022-12-07

## 2022-08-31 NOTE — PROGRESS NOTES
[facility] Follow Up Note    Assessment/Plan:  1. Pain in bone of jaw/cervical spine pain  Notes from pain clinic reviewed.  She is tearful here today because of her neck and associated jaw pain.  Recommendations: Follow-up pain clinic recommendations!  There is a referral for an occipital nerve block.  I have strongly encouraged her to complete this after her breast biopsy.  It will be very important to eliminate occipital pain.  Question whether jaw pain is associated with bruxism-stress.  Will reassess after occipital nerve block.  Additionally, she has yet to follow her last follow-up with Dr. Gregory.  She is going to do this as well.    2. C chronic bronchitis with reactive airways disease  With reactive airways-some wheezing noted today.  She is requesting a course of prednisone.  This was provided as I think it could benefit both her neck and lungs.  No antibiotics needed today    3. Type 2 diabetes mellitus without complication, without long-term current use of insulin (H)  Weight down about 10 pounds.  We will check A1c  - HEMOGLOBIN A1C; Future    4. Lump or mass in breast  Biopsy scheduled    5. Essential hypertension  Stable  - CBC with Platelets & Differential; Future  - Comprehensive metabolic panel; Future    6. Postablative hypothyroidism  We will update labs  - TSH with free T4 reflex; Future        Follow-up 6-week    Loida Stockton MD, MD    Chief Complaint:  Chief Complaint   Patient presents with     Follow Up       History of Present Illness:  Randi is a 67 year old female who is here today for discussion of a multitude of issues.  First, she perseverates regarding a bill from a year ago.  I needed to remind her that I am not a  and that she should work with the billing department.  Approximately 6 to 7 minutes is spent perseverating on this issue.    Secondly, she has ongoing pain in the occipital region wrapping around to the head.  There is associated jaw pain and  swelling of the muscles of the head and neck with this.  She has been seen by Dr. Dubois for this.  His notes are reviewed and he recommended an occipital nerve block.  She does not want to schedule this until she talks with Dr. Gregory.  She has not yet scheduled an appointment with Dr. Gregory but states that this is easy to do.  Of note, the pain seems to be the biggest issue.    She has bipolar 2 disorder.  She is working with Dr. Dubois on mental health along with a therapist.  She states she has not been doing well in this regard.  Her next follow-up appointment is in November.  She is somewhat labile with regard to affect today.    She states she has been bloated.  She has been using Edecrin daily.  Her weight is down about 10 pounds.    She has some ongoing wheezing.  Of note, she had an abnormal mammogram and will be having a breast biopsy.    Review of Systems:  A comprehensive review of systems was performed and was otherwise negative    PFSH:  Social History: She is .  She has been on medical disability for years  History   Smoking Status     Former Smoker     Packs/day: 2.50     Years: 20.00     Types: Cigarettes     Start date: 5/1/1971     Quit date: 6/29/2000   Smokeless Tobacco     Never Used       Past History:   Current Outpatient Medications   Medication Sig Dispense Refill     acetaminophen (TYLENOL) 325 MG tablet Take 2 tablets (650 mg) by mouth every 4 hours as needed for other (For optimal non-opioid multimodal pain management to improve pain control.)       albuterol (PROVENTIL) (2.5 MG/3ML) 0.083% neb solution Take 1 vial (2.5 mg) by nebulization every 4 hours as needed for shortness of breath / dyspnea or wheezing 360 mL 5     albuterol (VENTOLIN HFA) 108 (90 Base) MCG/ACT inhaler Inhale 2 puffs into the lungs every 6 hours 18 g 11     benzonatate (TESSALON) 200 MG capsule Take 1 capsule (200 mg) by mouth 3 times daily as needed for cough 20 capsule 1     Cholecalciferol  (VITAMIN D3) 250 MCG (89986 UT) TABS Take 1 tablet by mouth daily 12406/day       Cyanocobalamin (VITAMIN B-12) 5000 MCG SUBL Place 2-3 sprays under the tongue daily Unknown dose. 2 or 3 sprays/day       ethacrynic acid (EDECRIN) 25 MG tablet Take 1 tablet by mouth on Saturday and Wednesday 30 tablet 11     Fluticasone-Umeclidin-Vilanterol (TRELEGY ELLIPTA) 200-62.5-25 MCG/INH oral inhaler Inhale 1 puff into the lungs daily 1 each 11     guaiFENesin (MUCINEX) 600 MG 12 hr tablet Take 1 tablet (600 mg) by mouth 2 times daily 60 tablet 2     HYDROcodone-acetaminophen (NORCO) 5-325 MG tablet Take 1 tablet by mouth 3 times daily as needed for breakthrough pain or moderate to severe pain May fill 8/24 for 8/26 70 tablet 0     hydrOXYzine (ATARAX) 25 MG tablet Take 1 tablet by mouth daily as needed       LITHIUM PO Take 25 mg by mouth daily OTC lithium oratate       losartan (COZAAR) 25 MG tablet Take 1 tablet (25 mg) by mouth daily 90 tablet 2     magnesium 250 MG tablet Take 1 tablet by mouth 2 times daily       medical cannabis (Patient's own supply) See Admin Instructions (The purpose of this order is to document that the patient reports taking medical cannabis.  This is not a prescription, and is not used to certify that the patient has a qualifying medical condition.)  Flower       methocarbamol (ROBAXIN) 500 MG tablet Take 1 tablet (500 mg) by mouth 4 times daily Wean down on your frequency. - May fill/start 3/31/22 (Patient taking differently: Take 500 mg by mouth 3 times daily Wean down on your frequency. - May fill/start 3/31/22) 112 tablet 3     omeprazole (PRILOSEC) 20 MG DR capsule Take 1 capsule (20 mg) by mouth daily 90 capsule 3     OXcarbazepine (TRILEPTAL) 150 MG tablet Take one tablet daily at bedtime 30 tablet 3     potassium chloride ER (KLOR-CON M) 20 MEQ CR tablet Take 1 tablet (20 mEq) by mouth daily 90 tablet 3     predniSONE (DELTASONE) 20 MG tablet Take 2 tabs daily for 4 days One tab daily for  4 days Half tab daily for 4 days 14 tablet 1     rOPINIRole (REQUIP) 2 MG tablet Take 1-2 tablets (2-4 mg) by mouth At Bedtime 120 tablet 3     SYNTHROID 150 MCG tablet Take 1 tablet (150 mcg) by mouth daily 90 tablet 1     vitamin E 400 units TABS Take 800 Units by mouth daily         Family History:     Physical Exam:  General Appearance:   She is teary and perseverates  Vitals:    08/31/22 0701   BP: 128/80   BP Location: Left arm   Patient Position: Sitting   Cuff Size: Adult Large   Pulse: 94   SpO2: 95%   Weight: 109.4 kg (241 lb 1.6 oz)     Wt Readings from Last 3 Encounters:   08/31/22 109.4 kg (241 lb 1.6 oz)   07/11/22 114.2 kg (251 lb 11.2 oz)   07/01/22 112.1 kg (247 lb 3.2 oz)     Body mass index is 38.91 kg/m .    Lungs reveal occasional dry rhonchi and expiratory wheezes  Cardiac exam reveals regular rate and rhythm  Extremities are completely negative for edema.

## 2022-09-01 ENCOUNTER — MYC MEDICAL ADVICE (OUTPATIENT)
Dept: ONCOLOGY | Facility: CLINIC | Age: 67
End: 2022-09-01

## 2022-09-01 LAB
ALBUMIN SERPL BCG-MCNC: 4.5 G/DL (ref 3.5–5.2)
ALP SERPL-CCNC: 62 U/L (ref 35–104)
ALT SERPL W P-5'-P-CCNC: 21 U/L (ref 10–35)
ANION GAP SERPL CALCULATED.3IONS-SCNC: 13 MMOL/L (ref 7–15)
AST SERPL W P-5'-P-CCNC: 27 U/L (ref 10–35)
BILIRUB SERPL-MCNC: 0.4 MG/DL
BUN SERPL-MCNC: 15.4 MG/DL (ref 8–23)
CALCIUM SERPL-MCNC: 9.5 MG/DL (ref 8.8–10.2)
CHLORIDE SERPL-SCNC: 101 MMOL/L (ref 98–107)
CREAT SERPL-MCNC: 0.67 MG/DL (ref 0.51–0.95)
DEPRECATED HCO3 PLAS-SCNC: 22 MMOL/L (ref 22–29)
GFR SERPL CREATININE-BSD FRML MDRD: >90 ML/MIN/1.73M2
GLUCOSE SERPL-MCNC: 115 MG/DL (ref 70–99)
POTASSIUM SERPL-SCNC: 4.3 MMOL/L (ref 3.4–5.3)
PROT SERPL-MCNC: 7.2 G/DL (ref 6.4–8.3)
SODIUM SERPL-SCNC: 136 MMOL/L (ref 136–145)
TSH SERPL DL<=0.005 MIU/L-ACNC: 1.76 UIU/ML (ref 0.3–4.2)

## 2022-09-02 ENCOUNTER — VIRTUAL VISIT (OUTPATIENT)
Dept: PSYCHOLOGY | Facility: CLINIC | Age: 67
End: 2022-09-02
Payer: MEDICARE

## 2022-09-02 DIAGNOSIS — F41.1 GENERALIZED ANXIETY DISORDER: ICD-10-CM

## 2022-09-02 DIAGNOSIS — F31.81 BIPOLAR 2 DISORDER (H): Primary | ICD-10-CM

## 2022-09-02 DIAGNOSIS — F43.9 TRAUMA AND STRESSOR-RELATED DISORDER: ICD-10-CM

## 2022-09-02 PROCEDURE — 90837 PSYTX W PT 60 MINUTES: CPT | Mod: 95 | Performed by: SOCIAL WORKER

## 2022-09-02 ASSESSMENT — ANXIETY QUESTIONNAIRES
IF YOU CHECKED OFF ANY PROBLEMS ON THIS QUESTIONNAIRE, HOW DIFFICULT HAVE THESE PROBLEMS MADE IT FOR YOU TO DO YOUR WORK, TAKE CARE OF THINGS AT HOME, OR GET ALONG WITH OTHER PEOPLE: VERY DIFFICULT
8. IF YOU CHECKED OFF ANY PROBLEMS, HOW DIFFICULT HAVE THESE MADE IT FOR YOU TO DO YOUR WORK, TAKE CARE OF THINGS AT HOME, OR GET ALONG WITH OTHER PEOPLE?: VERY DIFFICULT
4. TROUBLE RELAXING: NEARLY EVERY DAY
2. NOT BEING ABLE TO STOP OR CONTROL WORRYING: MORE THAN HALF THE DAYS
3. WORRYING TOO MUCH ABOUT DIFFERENT THINGS: SEVERAL DAYS
GAD7 TOTAL SCORE: 13
GAD7 TOTAL SCORE: 13
1. FEELING NERVOUS, ANXIOUS, OR ON EDGE: MORE THAN HALF THE DAYS
4. TROUBLE RELAXING: NEARLY EVERY DAY
8. IF YOU CHECKED OFF ANY PROBLEMS, HOW DIFFICULT HAVE THESE MADE IT FOR YOU TO DO YOUR WORK, TAKE CARE OF THINGS AT HOME, OR GET ALONG WITH OTHER PEOPLE?: VERY DIFFICULT
5. BEING SO RESTLESS THAT IT IS HARD TO SIT STILL: SEVERAL DAYS
3. WORRYING TOO MUCH ABOUT DIFFERENT THINGS: SEVERAL DAYS
7. FEELING AFRAID AS IF SOMETHING AWFUL MIGHT HAPPEN: SEVERAL DAYS
GAD7 TOTAL SCORE: 13
7. FEELING AFRAID AS IF SOMETHING AWFUL MIGHT HAPPEN: SEVERAL DAYS
GAD7 TOTAL SCORE: 13
7. FEELING AFRAID AS IF SOMETHING AWFUL MIGHT HAPPEN: SEVERAL DAYS
GAD7 TOTAL SCORE: 13
1. FEELING NERVOUS, ANXIOUS, OR ON EDGE: MORE THAN HALF THE DAYS
7. FEELING AFRAID AS IF SOMETHING AWFUL MIGHT HAPPEN: SEVERAL DAYS
6. BECOMING EASILY ANNOYED OR IRRITABLE: NEARLY EVERY DAY
2. NOT BEING ABLE TO STOP OR CONTROL WORRYING: MORE THAN HALF THE DAYS
5. BEING SO RESTLESS THAT IT IS HARD TO SIT STILL: SEVERAL DAYS
GAD7 TOTAL SCORE: 13
6. BECOMING EASILY ANNOYED OR IRRITABLE: NEARLY EVERY DAY
IF YOU CHECKED OFF ANY PROBLEMS ON THIS QUESTIONNAIRE, HOW DIFFICULT HAVE THESE PROBLEMS MADE IT FOR YOU TO DO YOUR WORK, TAKE CARE OF THINGS AT HOME, OR GET ALONG WITH OTHER PEOPLE: VERY DIFFICULT

## 2022-09-02 NOTE — PROGRESS NOTES
[facility]                                     Progress Note    Patient Name: Randi Cleary  Date: 9/2/22         Service Type: Individual     Session Start Time: 10:32 AM  Session End Time: 11:30 AM     Session Length: 58 minutes    Session #: 131    Attendees: Client attended alone    Service Modality:  Video Visit:      Provider verified identity through the following two step process.  Patient provided:  Patient is known previously to provider    Telemedicine Visit: The patient's condition can be safely assessed and treated via synchronous audio and visual telemedicine encounter.      Reason for Telemedicine Visit: Patient convenience (e.g. access to timely appointments / distance to available provider)    Originating Site (Patient Location): Patient's home    Distant Site (Provider Location): Provider Remote Setting- Home Office    Consent:  The patient/guardian has verbally consented to: the potential risks and benefits of telemedicine (video visit) versus in person care; bill my insurance or make self-payment for services provided; and responsibility for payment of non-covered services.     Patient would like the video invitation sent by:  My Chart    Mode of Communication:  Video Conference via Amwell    As the provider I attest to compliance with applicable laws and regulations related to telemedicine.          DATA  Extended Session (53+ minutes): No  Interactive Complexity: No  Crisis: No        Progress Since Last Session (Related to Symptoms / Goals / Homework):   Symptoms: No change since earlier in the week    Homework: Progress made  Look at debt payoff  Make a plan for the money you received     Episode of Care Goals: Satisfactory progress - ACTION (Actively working towards change); Intervened by reinforcing change plan / affirming steps taken     Current / Ongoing Stressors and Concerns:   Patient is currently socially isolated. She has a conflictual relationship with her  .  She is getting minimal physical activity.  She had recent eye surgery and shoulder surgery.     Treatment Objective(s) Addressed in This Session:   Patient will increase frequency of engaging her in ADLs.  Patient will track and record at least 5 pleasant exchanges with . Patient will be able to identify at least 5 positive traits about her .  Patient will reduce level of depressive and anxious features as evidenced by reduction in score on her CHAVO-7 and PHQ-9 (scores of 15 and 16 at first measurment, respectively).     Intervention:   Supportive Therapy: Processed changes in her life that have been occurring, including the increase in mood changes. Talked about how she's coping with them and how they worry her. Discussed ways to communicate these with her , to ask for space when she needs it. Processed upcoming biopsy.      The following assessments were completed by patient for this visit:  PHQ9: of note, patient reported she was in a hurry and didn't read all questions, she reports no suicidal ideation  PHQ-9 SCORE 6/24/2022 6/28/2022 6/30/2022 7/14/2022 7/26/2022 8/9/2022 8/16/2022   PHQ-9 Total Score MyChart 14 (Moderate depression) 14 (Moderate depression) 17 (Moderately severe depression) 15 (Moderately severe depression) 13 (Moderate depression) 16 (Moderately severe depression) 15 (Moderately severe depression)   PHQ-9 Total Score 14 14 17 15 13 16 15   Some encounter information is confidential and restricted. Go to Review [employer] activity to see all data.     GAD7:   CHAVO-7 SCORE 6/24/2022 7/14/2022 7/14/2022 7/14/2022 8/9/2022 9/2/2022 9/2/2022   Total Score 16 (severe anxiety) - - 13 (moderate anxiety) 16 (severe anxiety) - 13 (moderate anxiety)   Total Score 16 13 13 13 16 13 13   Some encounter information is confidential and restricted. Go to Review [employer] activity to see all data.     PROMIS 10-Global Health (only subscores and total score):   PROMIS-10 Scores  Only 12/14/2021 3/15/2022 3/15/2022 3/15/2022 3/24/2022 4/1/2022 6/9/2022   Global Mental Health Score 6 6 6 6 8 12 12   Global Physical Health Score 9 9 9 9 8 11 10   PROMIS TOTAL - SUBSCORES 15 15 15 15 16 23 22        ASSESSMENT: Current Emotional / Mental Status (status of significant symptoms):   Risk status (Self / Other harm or suicidal ideation)   Patient denies current fears or concerns for personal safety.   Patient reports the following current or recent suicidal ideation or behaviors: intrusive thought of passive ideation.   Patient denies current or recent homicidal ideation or behaviors.   Patient denies current or recent self injurious behavior or ideation.   Patient denies other safety concerns.   Patient reports there has been no change in risk factors since their last session.     Patient reports there has been no change in protective factors since their last session.     A safety and risk management plan has been developed including: Patient consented to co-developed safety plan on 11/24/2020.  Safety and risk management plan was reviewed.   Patient agreed to use safety plan should any safety concerns arise.  A copy was made available to the patient.     Appearance:   Appropriate    Eye Contact:   Good    Psychomotor Behavior: Normal    Attitude:   Cooperative    Orientation:   All   Speech    Rate / Production: Normal/ Responsive    Volume:  Normal    Mood:    Normal   Affect:    Appropriate    Thought Content:  Clear    Thought Form:  Coherent    Insight:    Good      Medication Review:   No changes to current psychiatric medication(s)     Medication Compliance:   Yes     Changes in Health Issues:   None reported     Chemical Use Review:   Substance Use: Chemical use reviewed, no active concerns identified Nothing used since August 2021.     Tobacco Use: No current tobacco use.      Diagnosis:  1. Bipolar 2 disorder (H)    2. Generalized anxiety disorder    3. Trauma and stressor-related  disorder      Collateral Reports Completed:   Not Applicable    PLAN: (Patient Tasks / Therapist Tasks / Other)  Look at debt payoff  Make a plan for the money you received      There has been demonstrated improvement in functioning while patient has been engaged in psychotherapy/psychological service- if withdrawn the patient would deteriorate and/or relapse.     CRUZ MICAELA BAKER JO   2022                                                        ______________________________________________________________________    Individual Treatment Plan    Patient's Name: Randi Cleary  YOB: 1955    Date of Creation: 20  Date Treatment Plan Last Reviewed/Revised: 22    DSM5 Diagnoses: 296.89 Bipolar II Disorder Depressed, 300.02 (F41.1) Generalized Anxiety Disorder or Adjustment Disorders  309.89 (F43.8) Other Specified Trauma and Stressor Related Disorder  Psychosocial / Contextual Factors: Patient's entire family of origin has , she now has a sister-in-law and  as support.  Relationship with  is conflictual. She is recovering from surgeries  PROMIS (reviewed every 90 days): PROMIS-10 Scores  PROMIS 10-Global Health (only subscores and total score):   PROMIS-10 Scores Only 2021 3/15/2022 3/15/2022 3/15/2022 3/24/2022 2022 2022   Global Mental Health Score 6 6 6 6 8 12 12   Global Physical Health Score 9 9 9 9 8 11 10   PROMIS TOTAL - SUBSCORES 15 15 15 15 16 23 22       Referral / Collaboration:  Referral to another professional/service is not indicated at this time..    Anticipated number of session for this episode of care: 50  Anticipation frequency of session: Biweekly  Anticipated Duration of each session: 53 or more minutes due to intensity of trauma symptoms  Treatment plan will be reviewed in 90 days or when goals have been changed.   There has been demonstrated improvement in functioning while patient has been engaged in  "psychotherapy/psychological service- if withdrawn the patient would deteriorate and/or relapse.       MeasurableTreatment Goal(s) related to diagnosis / functional impairment(s)  Goal 1: Patient will \"jumpstart, getting going with the things I need to be doing around the house as far as picking up, doing things, trying to do something every day.  Also to lessen the animosity between me and my .\"    I will know I've met my goal when my shoulders are fixed and I can see.      Objective #A (Patient Action)    Patient will increase frequency of engaging her in ADLs.  Status: Continued - Date(s): 12/10/21, 3/9/22, 6/9/22, 9/2/22    Intervention(s)  Therapist will engage patient in CBT, specifically behavioral activation.    Objective #B  Patient will  track and record at least 5 pleasant exchanges with . Patient will be able to identify at least 5 positive traits about her  and how he relates to her.  Status: Continued - Date(s): 12/10/21, 3/9/22, 6/9/22, 9/2/22    Intervention(s)  Therapist will teach assertiveness skills and assign homework related to relationship interactions.    Objective #C  Patient will reduce level of depressive and anxious features as evidenced by reduction in score on her CHAVO-7 and PHQ-9 (scores of 15 and 16 at first measurment, respectively).  Status: Continued - Date(s): 12/10/21, 3/9/22, 6/9/22, 9/2/22    Intervention(s)  Therapist will engage patient in person-centered therapy and CBT.    Patient has reviewed and agreed to the above plan.      MICAELA SLADE  September 2, 2022                                                   Randi Cleary          SAFETY PLAN:  Step 1: Warning signs / cues (Thoughts, images, mood, situation, behavior) that a crisis may be developing:  ? Thoughts: \"I don't want to continue\" \"I am unwanted\"  ? Images: none  ? Thinking Processes: ruminating  ? Mood: anger  ? Behaviors: isolating/withdrawing , can't stop crying, not taking care of " "myself and not taking care of my responsibilities  ? Situations: small triggers, such as not being able to find something, or dropping something   Step 2: Coping strategies - Things I can do to take my mind off of my problems without contacting another person (relaxation technique, physical activity):  ? Distress Tolerance Strategies:  arts and crafts: drawing, play with my pet , listen to positive and upbeat music: any, change body temperature (ice pack/cold water)  and paced breathing/progressive muscle relaxation  ? Physical Activities: go for a walk, deep breathing and stretching   ? Focus on helpful thoughts:  \"You've been through this before, you can get through it again.\"  Step 3: People and social settings that provide distraction:                 Name: Carmen                            Name: Darien                           Name: Aleida       ? pool, shopping, Carmen's house, [employer]       Step 4: Remind myself of people and things that are important to me and worth living for:  Sidney, Clifford, Aleida, post-COVID world, options of what could be in your future        Step 5: When I am in crisis, I can ask these people to help me use my safety plan:                 Name: Sidney  Step 6: Making the environment safe:   ? go to sleep/daydream  Step 7: Professionals or agencies I can contact during a crisis:  ? [facility] Daytime Number: [phone]  ? Suicide Prevention Lifeline: 6-046-368-EZDJ (7826)  ? Crisis Text Line Service (available 24 hours a day, 7 days a week): Text [place] to 094347    Local Crisis Services: [facility] Crisis: [phone]     Call 911 or go to my nearest emergency department.       I helped develop this safety plan and agree to use it when needed.  I have been given a copy of this plan.       Client signature _________________________________________________________________  Today s date:  11/24/2020  Adapted from Safety Plan Template 2008 Randi SAUCEDA" Jameson is reprinted with the express permission of the authors.  No portion of the Safety Plan Template may be reproduced without the express, written permission.  You can contact the authors at [email or web address] or [email or web address].

## 2022-09-02 NOTE — TELEPHONE ENCOUNTER
[facility]: Hematology                                                                                  Patient recent labs reviewed~ refer to [employer] messaging       Plan of action:   Repeat labs in 2 months and RTC in January     Follow Up:  Spoke with Winnie regarding results and to answer her question about adding on the copper level to already collected samples.   Unfortunately copper testing is a specialized collection tube and dereje not be able to add on.    She has lab appointment scheduled on 9/29 which can be move if she desires.  Discuss and updated appointment date and time in January and Winnie states she will get labs at the [facility] closer to her home         Crissy Musa LPN  Hematology Care Coordination  [phone]

## 2022-09-06 ENCOUNTER — VIRTUAL VISIT (OUTPATIENT)
Dept: PSYCHOLOGY | Facility: CLINIC | Age: 67
End: 2022-09-06
Payer: MEDICARE

## 2022-09-06 DIAGNOSIS — F41.1 GENERALIZED ANXIETY DISORDER: ICD-10-CM

## 2022-09-06 DIAGNOSIS — F31.81 BIPOLAR 2 DISORDER (H): Primary | ICD-10-CM

## 2022-09-06 DIAGNOSIS — F43.9 TRAUMA AND STRESSOR-RELATED DISORDER: ICD-10-CM

## 2022-09-06 PROCEDURE — 90837 PSYTX W PT 60 MINUTES: CPT | Mod: 95 | Performed by: SOCIAL WORKER

## 2022-09-06 NOTE — PROGRESS NOTES
[facility]                                     Progress Note    Patient Name: Randi Cleary  Date: 9/6/22         Service Type: Individual     Session Start Time: 11:32 AM  Session End Time: 12:31 AM     Session Length: 59 minutes    Session #: 132    Attendees: Client attended alone    Service Modality:  Video Visit:      Provider verified identity through the following two step process.  Patient provided:  Patient is known previously to provider    Telemedicine Visit: The patient's condition can be safely assessed and treated via synchronous audio and visual telemedicine encounter.      Reason for Telemedicine Visit: Patient convenience (e.g. access to timely appointments / distance to available provider)    Originating Site (Patient Location): Patient's home    Distant Site (Provider Location): Provider Remote Setting- Home Office    Consent:  The patient/guardian has verbally consented to: the potential risks and benefits of telemedicine (video visit) versus in person care; bill my insurance or make self-payment for services provided; and responsibility for payment of non-covered services.     Patient would like the video invitation sent by:  My Chart    Mode of Communication:  Video Conference via Amwell    As the provider I attest to compliance with applicable laws and regulations related to telemedicine.          DATA  Extended Session (53+ minutes): No  Interactive Complexity: No  Crisis: No        Progress Since Last Session (Related to Symptoms / Goals / Homework):   Symptoms: No significant change    Homework: Progress made  Look at debt payoff  Make a plan for the money you received     Episode of Care Goals: Satisfactory progress - ACTION (Actively working towards change); Intervened by reinforcing change plan / affirming steps taken     Current / Ongoing Stressors and Concerns:   Patient is currently socially isolated. She has a conflictual relationship with her .  She  is getting minimal physical activity.  She had recent eye surgery and shoulder surgery.     Treatment Objective(s) Addressed in This Session:   Patient will increase frequency of engaging her in ADLs.  Patient will track and record at least 5 pleasant exchanges with . Patient will be able to identify at least 5 positive traits about her .  Patient will reduce level of depressive and anxious features as evidenced by reduction in score on her CHAVO-7 and PHQ-9 (scores of 15 and 16 at first measurment, respectively).     Intervention:   Supportive Therapy: Processed relaxation and self-care options for patient, overcoming barriers for them. Talked through emotions related to family relationships.     The following assessments were completed by patient for this visit:  PHQ9: of note, patient reported she was in a hurry and didn't read all questions, she reports no suicidal ideation  PHQ-9 SCORE 6/24/2022 6/28/2022 6/30/2022 7/14/2022 7/26/2022 8/9/2022 8/16/2022   PHQ-9 Total Score MyChart 14 (Moderate depression) 14 (Moderate depression) 17 (Moderately severe depression) 15 (Moderately severe depression) 13 (Moderate depression) 16 (Moderately severe depression) 15 (Moderately severe depression)   PHQ-9 Total Score 14 14 17 15 13 16 15   Some encounter information is confidential and restricted. Go to Review Identyx activity to see all data.     GAD7:   CHAVO-7 SCORE 6/24/2022 7/14/2022 7/14/2022 7/14/2022 8/9/2022 9/2/2022 9/2/2022   Total Score 16 (severe anxiety) - - 13 (moderate anxiety) 16 (severe anxiety) - 13 (moderate anxiety)   Total Score 16 13 13 13 16 13 13   Some encounter information is confidential and restricted. Go to Review Flowsheets activity to see all data.     PROMIS 10-Global Health (only subscores and total score):   PROMIS-10 Scores Only 3/15/2022 3/24/2022 4/1/2022 6/9/2022 9/6/2022 9/6/2022 9/6/2022   Global Mental Health Score 6 8 12 12 7 7 7   Global Physical Health Score 9 8  11 10 9 9 9   PROMIS TOTAL - SUBSCORES 15 16 23 22 16 16 16        ASSESSMENT: Current Emotional / Mental Status (status of significant symptoms):   Risk status (Self / Other harm or suicidal ideation)   Patient denies current fears or concerns for personal safety.   Patient reports the following current or recent suicidal ideation or behaviors: intrusive thought of passive ideation.   Patient denies current or recent homicidal ideation or behaviors.   Patient denies current or recent self injurious behavior or ideation.   Patient denies other safety concerns.   Patient reports there has been no change in risk factors since their last session.     Patient reports there has been no change in protective factors since their last session.     A safety and risk management plan has been developed including: Patient consented to co-developed safety plan on 11/24/2020.  Safety and risk management plan was reviewed.   Patient agreed to use safety plan should any safety concerns arise.  A copy was made available to the patient.     Appearance:   Appropriate    Eye Contact:   Good    Psychomotor Behavior: Normal    Attitude:   Cooperative    Orientation:   All   Speech    Rate / Production: Normal/ Responsive    Volume:  Normal    Mood:    Normal   Affect:    Appropriate    Thought Content:  Clear    Thought Form:  Coherent    Insight:    Good      Medication Review:   No changes to current psychiatric medication(s)     Medication Compliance:   Yes     Changes in Health Issues:   None reported     Chemical Use Review:   Substance Use: Chemical use reviewed, no active concerns identified Nothing used since August 2021.     Tobacco Use: No current tobacco use.      Diagnosis:  1. Bipolar 2 disorder (H)    2. Generalized anxiety disorder    3. Trauma and stressor-related disorder      Collateral Reports Completed:   Not Applicable    PLAN: (Patient Tasks / Therapist Tasks / Other)  Look at debt payoff  Make a plan for the money  you received      There has been demonstrated improvement in functioning while patient has been engaged in psychotherapy/psychological service- if withdrawn the patient would deteriorate and/or relapse.     CRUZ MICAELA BAKER JO   2022                                                        ______________________________________________________________________    Individual Treatment Plan    Patient's Name: Randi Cleary  YOB: 1955    Date of Creation: 20  Date Treatment Plan Last Reviewed/Revised: 22    DSM5 Diagnoses: 296.89 Bipolar II Disorder Depressed, 300.02 (F41.1) Generalized Anxiety Disorder or Adjustment Disorders  309.89 (F43.8) Other Specified Trauma and Stressor Related Disorder  Psychosocial / Contextual Factors: Patient's entire family of origin has , she now has a sister-in-law and  as support.  Relationship with  is conflictual. She is recovering from surgeries  PROMIS (reviewed every 90 days): PROMIS-10 Scores  PROMIS 10-Global Health (only subscores and total score):   PROMIS-10 Scores Only 2021 3/15/2022 3/15/2022 3/15/2022 3/24/2022 2022 2022   Global Mental Health Score 6 6 6 6 8 12 12   Global Physical Health Score 9 9 9 9 8 11 10   PROMIS TOTAL - SUBSCORES 15 15 15 15 16 23 22       Referral / Collaboration:  Referral to another professional/service is not indicated at this time..    Anticipated number of session for this episode of care: 50  Anticipation frequency of session: Biweekly  Anticipated Duration of each session: 53 or more minutes due to intensity of trauma symptoms  Treatment plan will be reviewed in 90 days or when goals have been changed.   There has been demonstrated improvement in functioning while patient has been engaged in psychotherapy/psychological service- if withdrawn the patient would deteriorate and/or relapse.       MeasurableTreatment Goal(s) related to diagnosis / functional impairment(s)  Goal  "1: Patient will \"jumpstart, getting going with the things I need to be doing around the house as far as picking up, doing things, trying to do something every day.  Also to lessen the animosity between me and my .\"    I will know I've met my goal when my shoulders are fixed and I can see.      Objective #A (Patient Action)    Patient will increase frequency of engaging her in ADLs.  Status: Continued - Date(s): 12/10/21, 3/9/22, 6/9/22, 9/2/22    Intervention(s)  Therapist will engage patient in CBT, specifically behavioral activation.    Objective #B  Patient will  track and record at least 5 pleasant exchanges with . Patient will be able to identify at least 5 positive traits about her  and how he relates to her.  Status: Continued - Date(s): 12/10/21, 3/9/22, 6/9/22, 9/2/22    Intervention(s)  Therapist will teach assertiveness skills and assign homework related to relationship interactions.    Objective #C  Patient will reduce level of depressive and anxious features as evidenced by reduction in score on her CHAVO-7 and PHQ-9 (scores of 15 and 16 at first measurment, respectively).  Status: Continued - Date(s): 12/10/21, 3/9/22, 6/9/22, 9/2/22    Intervention(s)  Therapist will engage patient in person-centered therapy and CBT.    Patient has reviewed and agreed to the above plan.      MICAELA SLADE  September 2, 2022                                                   Randi Cleary          SAFETY PLAN:  Step 1: Warning signs / cues (Thoughts, images, mood, situation, behavior) that a crisis may be developing:  ? Thoughts: \"I don't want to continue\" \"I am unwanted\"  ? Images: none  ? Thinking Processes: ruminating  ? Mood: anger  ? Behaviors: isolating/withdrawing , can't stop crying, not taking care of myself and not taking care of my responsibilities  ? Situations: small triggers, such as not being able to find something, or dropping something   Step 2: Coping strategies - Things I " "can do to take my mind off of my problems without contacting another person (relaxation technique, physical activity):  ? Distress Tolerance Strategies:  arts and crafts: drawing, play with my pet , listen to positive and upbeat music: any, change body temperature (ice pack/cold water)  and paced breathing/progressive muscle relaxation  ? Physical Activities: go for a walk, deep breathing and stretching   ? Focus on helpful thoughts:  \"You've been through this before, you can get through it again.\"  Step 3: People and social settings that provide distraction:                 Name: Carmen                            Name: Darien                           Name: Aleida       ? pool, shopping, Carmen's house, [employer]       Step 4: Remind myself of people and things that are important to me and worth living for:  Clifford Little Donna, post-COVID world, options of what could be in your future        Step 5: When I am in crisis, I can ask these people to help me use my safety plan:                 Name: Sidney  Step 6: Making the environment safe:   ? go to sleep/daydream  Step 7: Professionals or agencies I can contact during a crisis:  ? [facility] Daytime Number: [phone]  ? Suicide Prevention Lifeline: 6-527-665-TALK (4646)  ? Crisis Text Line Service (available 24 hours a day, 7 days a week): Text [place] to 417195    Local Crisis Services: [facility] Crisis: [phone]     Call 911 or go to my nearest emergency department.       I helped develop this safety plan and agree to use it when needed.  I have been given a copy of this plan.       Client signature _________________________________________________________________  Today s date:  11/24/2020  Adapted from Safety Plan Template 2008 Randi Poole and Robby Barba is reprinted with the express permission of the authors.  No portion of the Safety Plan Template may be reproduced without the express, written permission.  You can contact the " authors at [email or web address] or [email or web address].

## 2022-09-07 ENCOUNTER — ANCILLARY PROCEDURE (OUTPATIENT)
Dept: MAMMOGRAPHY | Facility: CLINIC | Age: 67
End: 2022-09-07
Attending: INTERNAL MEDICINE
Payer: MEDICARE

## 2022-09-07 DIAGNOSIS — R92.8 ABNORMAL FINDING ON BREAST IMAGING: Primary | ICD-10-CM

## 2022-09-07 PROCEDURE — 88305 TISSUE EXAM BY PATHOLOGIST: CPT | Mod: 26 | Performed by: PATHOLOGY

## 2022-09-07 PROCEDURE — 77066 DX MAMMO INCL CAD BI: CPT | Performed by: RADIOLOGY

## 2022-09-07 PROCEDURE — G0279 TOMOSYNTHESIS, MAMMO: HCPCS | Performed by: RADIOLOGY

## 2022-09-07 PROCEDURE — 88305 TISSUE EXAM BY PATHOLOGIST: CPT | Mod: TC | Performed by: INTERNAL MEDICINE

## 2022-09-07 RX ORDER — LIDOCAINE HYDROCHLORIDE 10 MG/ML
10 INJECTION, SOLUTION EPIDURAL; INFILTRATION; INTRACAUDAL; PERINEURAL ONCE
Status: CANCELLED | OUTPATIENT
Start: 2022-09-07 | End: 2022-09-07

## 2022-09-07 RX ORDER — IOPAMIDOL 612 MG/ML
164 INJECTION, SOLUTION INTRATHECAL ONCE
Status: CANCELLED | OUTPATIENT
Start: 2022-09-07 | End: 2022-09-07

## 2022-09-07 RX ADMIN — IOPAMIDOL 164 ML: 612 INJECTION, SOLUTION INTRATHECAL at 13:49

## 2022-09-07 RX ADMIN — LIDOCAINE HYDROCHLORIDE AND EPINEPHRINE 5 ML: 10; 10 INJECTION, SOLUTION INFILTRATION; PERINEURAL at 14:58

## 2022-09-08 ENCOUNTER — VIRTUAL VISIT (OUTPATIENT)
Dept: PSYCHOLOGY | Facility: CLINIC | Age: 67
End: 2022-09-08
Payer: MEDICARE

## 2022-09-08 DIAGNOSIS — F43.9 TRAUMA AND STRESSOR-RELATED DISORDER: ICD-10-CM

## 2022-09-08 DIAGNOSIS — F41.1 GENERALIZED ANXIETY DISORDER: ICD-10-CM

## 2022-09-08 DIAGNOSIS — F31.81 BIPOLAR 2 DISORDER (H): Primary | ICD-10-CM

## 2022-09-08 PROCEDURE — 90837 PSYTX W PT 60 MINUTES: CPT | Mod: 95 | Performed by: SOCIAL WORKER

## 2022-09-08 NOTE — PROGRESS NOTES
[facility]                                     Progress Note    Patient Name: Randi Cleary  Date: 9/8/22         Service Type: Individual     Session Start Time: 11:33 AM  Session End Time: 12:27 AM     Session Length: 54 minutes    Session #: 133    Attendees: Client attended alone    Service Modality:  Video Visit:      Provider verified identity through the following two step process.  Patient provided:  Patient is known previously to provider    Telemedicine Visit: The patient's condition can be safely assessed and treated via synchronous audio and visual telemedicine encounter.      Reason for Telemedicine Visit: Patient convenience (e.g. access to timely appointments / distance to available provider)    Originating Site (Patient Location): Patient's home    Distant Site (Provider Location): Provider Remote Setting- Home Office    Consent:  The patient/guardian has verbally consented to: the potential risks and benefits of telemedicine (video visit) versus in person care; bill my insurance or make self-payment for services provided; and responsibility for payment of non-covered services.     Patient would like the video invitation sent by:  My Chart    Mode of Communication:  Video Conference via Amwell    As the provider I attest to compliance with applicable laws and regulations related to telemedicine.          DATA  Extended Session (53+ minutes): PROLONGED SERVICE IN THE OUTPATIENT SETTING REQUIRING DIRECT (FACE-TO-FACE) PATIENT CONTACT BEYOND THE USUAL SERVICE:    - High distress and under complex circumstances.  See Data section for details  Interactive Complexity: No  Crisis: No        Progress Since Last Session (Related to Symptoms / Goals / Homework):   Symptoms: No significant change    Homework: Progress made  Look at debt payoff  Make a plan for the money you received -done some     Episode of Care Goals: Satisfactory progress - ACTION (Actively working towards change);  Intervened by reinforcing change plan / affirming steps taken     Current / Ongoing Stressors and Concerns:   Patient is currently socially isolated. She has a conflictual relationship with her .  She is getting minimal physical activity.  She had recent eye surgery and shoulder surgery.     Treatment Objective(s) Addressed in This Session:   Patient will increase frequency of engaging her in ADLs.  Patient will track and record at least 5 pleasant exchanges with . Patient will be able to identify at least 5 positive traits about her .  Patient will reduce level of depressive and anxious features as evidenced by reduction in score on her CHAVO-7 and PHQ-9 (scores of 15 and 16 at first measurment, respectively).     Intervention:   Supportive Therapy: Talked about self-care she's doing, including booking a vacation. Discussed relationships. Talked about awaiting biopsy results and how to care for herself while waiting.    The following assessments were completed by patient for this visit:  PHQ9: of note, patient reported she was in a hurry and didn't read all questions, she reports no suicidal ideation  PHQ-9 SCORE 6/24/2022 6/28/2022 6/30/2022 7/14/2022 7/26/2022 8/9/2022 8/16/2022   PHQ-9 Total Score MyChart 14 (Moderate depression) 14 (Moderate depression) 17 (Moderately severe depression) 15 (Moderately severe depression) 13 (Moderate depression) 16 (Moderately severe depression) 15 (Moderately severe depression)   PHQ-9 Total Score 14 14 17 15 13 16 15   Some encounter information is confidential and restricted. Go to Review Synchrony activity to see all data.     GAD7:   CAHVO-7 SCORE 6/24/2022 7/14/2022 7/14/2022 7/14/2022 8/9/2022 9/2/2022 9/2/2022   Total Score 16 (severe anxiety) - - 13 (moderate anxiety) 16 (severe anxiety) - 13 (moderate anxiety)   Total Score 16 13 13 13 16 13 13   Some encounter information is confidential and restricted. Go to Review [employer]heets activity to see all  data.     PROMIS 10-Global Health (only subscores and total score):   PROMIS-10 Scores Only 3/15/2022 3/24/2022 4/1/2022 6/9/2022 9/6/2022 9/6/2022 9/6/2022   Global Mental Health Score 6 8 12 12 7 7 7   Global Physical Health Score 9 8 11 10 9 9 9   PROMIS TOTAL - SUBSCORES 15 16 23 22 16 16 16        ASSESSMENT: Current Emotional / Mental Status (status of significant symptoms):   Risk status (Self / Other harm or suicidal ideation)   Patient denies current fears or concerns for personal safety.   Patient reports the following current or recent suicidal ideation or behaviors: intrusive thought of passive ideation.   Patient denies current or recent homicidal ideation or behaviors.   Patient denies current or recent self injurious behavior or ideation.   Patient denies other safety concerns.   Patient reports there has been no change in risk factors since their last session.     Patient reports there has been no change in protective factors since their last session.     A safety and risk management plan has been developed including: Patient consented to co-developed safety plan on 11/24/2020.  Safety and risk management plan was reviewed.   Patient agreed to use safety plan should any safety concerns arise.  A copy was made available to the patient.     Appearance:   Appropriate    Eye Contact:   Good    Psychomotor Behavior: Normal    Attitude:   Cooperative    Orientation:   All   Speech    Rate / Production: Normal/ Responsive    Volume:  Normal    Mood:    Normal   Affect:    Appropriate    Thought Content:  Clear    Thought Form:  Coherent    Insight:    Good      Medication Review:   No changes to current psychiatric medication(s)     Medication Compliance:   Yes     Changes in Health Issues:   None reported     Chemical Use Review:   Substance Use: Chemical use reviewed, no active concerns identified Nothing used since August 2021.     Tobacco Use: No current tobacco use.      Diagnosis:  1. Bipolar 2  disorder (H)    2. Generalized anxiety disorder    3. Trauma and stressor-related disorder      Collateral Reports Completed:   Not Applicable    PLAN: (Patient Tasks / Therapist Tasks / Other)  Look at debt payoff  Make a plan for the money you received      There has been demonstrated improvement in functioning while patient has been engaged in psychotherapy/psychological service- if withdrawn the patient would deteriorate and/or relapse.     CRUZ MICAELA BAKER JO   2022                                                        ______________________________________________________________________    Individual Treatment Plan    Patient's Name: Randi Cleary  YOB: 1955    Date of Creation: 20  Date Treatment Plan Last Reviewed/Revised: 22    DSM5 Diagnoses: 296.89 Bipolar II Disorder Depressed, 300.02 (F41.1) Generalized Anxiety Disorder or Adjustment Disorders  309.89 (F43.8) Other Specified Trauma and Stressor Related Disorder  Psychosocial / Contextual Factors: Patient's entire family of origin has , she now has a sister-in-law and  as support.  Relationship with  is conflictual. She is recovering from surgeries  PROMIS (reviewed every 90 days): PROMIS-10 Scores  PROMIS 10-Global Health (only subscores and total score):   PROMIS-10 Scores Only 2021 3/15/2022 3/15/2022 3/15/2022 3/24/2022 2022 2022   Global Mental Health Score 6 6 6 6 8 12 12   Global Physical Health Score 9 9 9 9 8 11 10   PROMIS TOTAL - SUBSCORES 15 15 15 15 16 23 22       Referral / Collaboration:  Referral to another professional/service is not indicated at this time..    Anticipated number of session for this episode of care: 50  Anticipation frequency of session: Biweekly  Anticipated Duration of each session: 53 or more minutes due to intensity of trauma symptoms  Treatment plan will be reviewed in 90 days or when goals have been changed.   There has been demonstrated  "improvement in functioning while patient has been engaged in psychotherapy/psychological service- if withdrawn the patient would deteriorate and/or relapse.       MeasurableTreatment Goal(s) related to diagnosis / functional impairment(s)  Goal 1: Patient will \"jumpstart, getting going with the things I need to be doing around the house as far as picking up, doing things, trying to do something every day.  Also to lessen the animosity between me and my .\"    I will know I've met my goal when my shoulders are fixed and I can see.      Objective #A (Patient Action)    Patient will increase frequency of engaging her in ADLs.  Status: Continued - Date(s): 12/10/21, 3/9/22, 6/9/22, 9/2/22    Intervention(s)  Therapist will engage patient in CBT, specifically behavioral activation.    Objective #B  Patient will  track and record at least 5 pleasant exchanges with . Patient will be able to identify at least 5 positive traits about her  and how he relates to her.  Status: Continued - Date(s): 12/10/21, 3/9/22, 6/9/22, 9/2/22    Intervention(s)  Therapist will teach assertiveness skills and assign homework related to relationship interactions.    Objective #C  Patient will reduce level of depressive and anxious features as evidenced by reduction in score on her CHAVO-7 and PHQ-9 (scores of 15 and 16 at first measurment, respectively).  Status: Continued - Date(s): 12/10/21, 3/9/22, 6/9/22, 9/2/22    Intervention(s)  Therapist will engage patient in person-centered therapy and CBT.    Patient has reviewed and agreed to the above plan.      MICAELA SLADE  September 2, 2022                                                   Randi Cleary          SAFETY PLAN:  Step 1: Warning signs / cues (Thoughts, images, mood, situation, behavior) that a crisis may be developing:  ? Thoughts: \"I don't want to continue\" \"I am unwanted\"  ? Images: none  ? Thinking " "Processes: ruminating  ? Mood: anger  ? Behaviors: isolating/withdrawing , can't stop crying, not taking care of myself and not taking care of my responsibilities  ? Situations: small triggers, such as not being able to find something, or dropping something   Step 2: Coping strategies - Things I can do to take my mind off of my problems without contacting another person (relaxation technique, physical activity):  ? Distress Tolerance Strategies:  arts and crafts: drawing, play with my pet , listen to positive and upbeat music: any, change body temperature (ice pack/cold water)  and paced breathing/progressive muscle relaxation  ? Physical Activities: go for a walk, deep breathing and stretching   ? Focus on helpful thoughts:  \"You've been through this before, you can get through it again.\"  Step 3: People and social settings that provide distraction:                 Name: Carmen                            Name: Darien                           Name: Aleida       ? pool, shopping, Carmen's house, [employer]       Step 4: Remind myself of people and things that are important to me and worth living for:  Clifford Little Donna, post-COVID world, options of what could be in your future        Step 5: When I am in crisis, I can ask these people to help me use my safety plan:                 Name: Sidney  Step 6: Making the environment safe:   ? go to sleep/daydream  Step 7: Professionals or agencies I can contact during a crisis:  ? [facility] Daytime Number: [phone]  ? Suicide Prevention Lifeline: 1-221-521-FWAV (8337)  ? Crisis Text Line Service (available 24 hours a day, 7 days a week): Text [place] to 796672    Local Crisis Services: [facility] Crisis: [phone]     Call 911 or go to my nearest emergency department.       I helped develop this safety plan and agree to use it when needed.  I have been given a copy of this plan.       Client " signature _________________________________________________________________  Today s date:  11/24/2020  Adapted from Safety Plan Template 2008 Randi Poole and Robby Barba is reprinted with the express permission of the authors.  No portion of the Safety Plan Template may be reproduced without the express, written permission.  You can contact the authors at [email or web address] or [email or web address].

## 2022-09-09 ENCOUNTER — TELEPHONE (OUTPATIENT)
Dept: MAMMOGRAPHY | Facility: CLINIC | Age: 67
End: 2022-09-09

## 2022-09-09 LAB
PATH REPORT.COMMENTS IMP SPEC: NORMAL
PATH REPORT.COMMENTS IMP SPEC: NORMAL
PATH REPORT.FINAL DX SPEC: NORMAL
PATH REPORT.GROSS SPEC: NORMAL
PATH REPORT.MICROSCOPIC SPEC OTHER STN: NORMAL
PATH REPORT.RELEVANT HX SPEC: NORMAL
PHOTO IMAGE: NORMAL

## 2022-09-09 NOTE — TELEPHONE ENCOUNTER
Spoke to Winnie about the benign findings from her breast biopsy done earlier this week.  We discussed the Radiologist's recommendation of a punch biopsy to evaluate skin changes and a 6 month follow up contrast mammogram.  She is currently scheduled to see Carley Montalvo on Wednesday, October 12th at 10:00am at the [facility] for a skin punch biopsy.  Orders were submitted for a contrast mammogram in 6 months, Winnie declined to schedule at this time and requested a phone call closer to the appointment in March 2023.  Winnie verbalized understanding of the plan and all questions and concerns were answered at this time.

## 2022-09-13 ENCOUNTER — VIRTUAL VISIT (OUTPATIENT)
Dept: PSYCHOLOGY | Facility: CLINIC | Age: 67
End: 2022-09-13
Payer: MEDICARE

## 2022-09-13 DIAGNOSIS — F41.1 GENERALIZED ANXIETY DISORDER: ICD-10-CM

## 2022-09-13 DIAGNOSIS — F31.81 BIPOLAR 2 DISORDER (H): Primary | ICD-10-CM

## 2022-09-13 DIAGNOSIS — F43.9 TRAUMA AND STRESSOR-RELATED DISORDER: ICD-10-CM

## 2022-09-13 PROCEDURE — 90834 PSYTX W PT 45 MINUTES: CPT | Mod: 95 | Performed by: SOCIAL WORKER

## 2022-09-13 ASSESSMENT — PATIENT HEALTH QUESTIONNAIRE - PHQ9
SUM OF ALL RESPONSES TO PHQ QUESTIONS 1-9: 7
10. IF YOU CHECKED OFF ANY PROBLEMS, HOW DIFFICULT HAVE THESE PROBLEMS MADE IT FOR YOU TO DO YOUR WORK, TAKE CARE OF THINGS AT HOME, OR GET ALONG WITH OTHER PEOPLE: SOMEWHAT DIFFICULT
SUM OF ALL RESPONSES TO PHQ QUESTIONS 1-9: 7

## 2022-09-13 NOTE — PROGRESS NOTES
[facility]                                     Progress Note    Patient Name: Randi Cleary  Date: 9/13/22         Service Type: Individual     Session Start Time: 11:37 AM  Session End Time: 12:27 AM     Session Length: 50 minutes    Session #: 134    Attendees: Client attended alone    Service Modality:  Video Visit:      Provider verified identity through the following two step process.  Patient provided:  Patient is known previously to provider    Telemedicine Visit: The patient's condition can be safely assessed and treated via synchronous audio and visual telemedicine encounter.      Reason for Telemedicine Visit: Patient convenience (e.g. access to timely appointments / distance to available provider)    Originating Site (Patient Location): Patient's home    Distant Site (Provider Location): Provider Remote Setting- Home Office    Consent:  The patient/guardian has verbally consented to: the potential risks and benefits of telemedicine (video visit) versus in person care; bill my insurance or make self-payment for services provided; and responsibility for payment of non-covered services.     Patient would like the video invitation sent by:  My Chart    Mode of Communication:  Video Conference via [employer]    As the provider I attest to compliance with applicable laws and regulations related to telemedicine.          DATA  Extended Session (53+ minutes): No  Interactive Complexity: No  Crisis: No        Progress Since Last Session (Related to Symptoms / Goals / Homework):   Symptoms: No significant change    Homework: Progress made  Look at debt payoff -not done  Make a plan for the money you received -done some     Episode of Care Goals: Satisfactory progress - ACTION (Actively working towards change); Intervened by reinforcing change plan / affirming steps taken     Current / Ongoing Stressors and Concerns:   Patient is currently socially isolated. She has a conflictual relationship  with her .  She is getting minimal physical activity.  She had recent eye surgery and shoulder surgery.     Treatment Objective(s) Addressed in This Session:   Patient will increase frequency of engaging her in ADLs.  Patient will track and record at least 5 pleasant exchanges with . Patient will be able to identify at least 5 positive traits about her .  Patient will reduce level of depressive and anxious features as evidenced by reduction in score on her CHAVO-7 and PHQ-9 (scores of 15 and 16 at first measurment, respectively).     Intervention:   Supportive Therapy: Discussed patient's mood. Looked at her frustration with her to do lists being as long as they were. Looked at medical concerns and insurance frustrations. Discussed self-care. Reviewed homework and identified successes and barriers to completion.      The following assessments were completed by patient for this visit:  PHQ9: of note, patient reported she was in a hurry and didn't read all questions, she reports no suicidal ideation  PHQ-9 SCORE 6/28/2022 6/30/2022 7/14/2022 7/26/2022 8/9/2022 8/16/2022 9/13/2022   PHQ-9 Total Score MyChart 14 (Moderate depression) 17 (Moderately severe depression) 15 (Moderately severe depression) 13 (Moderate depression) 16 (Moderately severe depression) 15 (Moderately severe depression) 7 (Mild depression)   PHQ-9 Total Score 14 17 15 13 16 15 7   Some encounter information is confidential and restricted. Go to Review [employer] activity to see all data.     GAD7:   CHAVO-7 SCORE 6/24/2022 7/14/2022 7/14/2022 7/14/2022 8/9/2022 9/2/2022 9/2/2022   Total Score 16 (severe anxiety) - - 13 (moderate anxiety) 16 (severe anxiety) - 13 (moderate anxiety)   Total Score 16 13 13 13 16 13 13   Some encounter information is confidential and restricted. Go to Review "[employer]heets activity to see all data.     PROMIS 10-Global Health (only subscores and total score):   PROMIS-10 Scores Only 3/15/2022 3/24/2022  4/1/2022 6/9/2022 9/6/2022 9/6/2022 9/6/2022   Global Mental Health Score 6 8 12 12 7 7 7   Global Physical Health Score 9 8 11 10 9 9 9   PROMIS TOTAL - SUBSCORES 15 16 23 22 16 16 16        ASSESSMENT: Current Emotional / Mental Status (status of significant symptoms):   Risk status (Self / Other harm or suicidal ideation)   Patient denies current fears or concerns for personal safety.   Patient denies current or recent suicidal ideation or behaviors.   Patient denies current or recent homicidal ideation or behaviors.   Patient denies current or recent self injurious behavior or ideation.   Patient denies other safety concerns.   Patient reports there has been no change in risk factors since their last session.     Patient reports there has been no change in protective factors since their last session.     A safety and risk management plan has been developed including: Patient consented to co-developed safety plan on 11/24/2020.  Safety and risk management plan was reviewed.   Patient agreed to use safety plan should any safety concerns arise.  A copy was made available to the patient.     Appearance:   Appropriate    Eye Contact:   Good    Psychomotor Behavior: Normal    Attitude:   Cooperative    Orientation:   All   Speech    Rate / Production: Normal/ Responsive    Volume:  Normal    Mood:    Normal   Affect:    Appropriate    Thought Content:  Clear  Rumination    Thought Form:  Coherent  Obsessive    Insight:    Good      Medication Review:   No changes to current psychiatric medication(s)     Medication Compliance:   Yes     Changes in Health Issues:   None reported     Chemical Use Review:   Substance Use: Chemical use reviewed, no active concerns identified Nothing used since August 2021.     Tobacco Use: No current tobacco use.      Diagnosis:  1. Bipolar 2 disorder (H)    2. Generalized anxiety disorder    3. Trauma and stressor-related disorder      Collateral Reports Completed:   Not  Applicable    PLAN: (Patient Tasks / Therapist Tasks / Other)  Do a budget  File taxes      There has been demonstrated improvement in functioning while patient has been engaged in psychotherapy/psychological service- if withdrawn the patient would deteriorate and/or relapse.     CRUZ MICAELA BAKER JO   2022                                                        ______________________________________________________________________    Individual Treatment Plan    Patient's Name: Randi Cleary  YOB: 1955    Date of Creation: 20  Date Treatment Plan Last Reviewed/Revised: 22    DSM5 Diagnoses: 296.89 Bipolar II Disorder Depressed, 300.02 (F41.1) Generalized Anxiety Disorder or Adjustment Disorders  309.89 (F43.8) Other Specified Trauma and Stressor Related Disorder  Psychosocial / Contextual Factors: Patient's entire family of origin has , she now has a sister-in-law and  as support.  Relationship with  is conflictual. She is recovering from surgeries  PROMIS (reviewed every 90 days): PROMIS-10 Scores  PROMIS 10-Global Health (only subscores and total score):   PROMIS-10 Scores Only 2021 3/15/2022 3/15/2022 3/15/2022 3/24/2022 2022 2022   Global Mental Health Score 6 6 6 6 8 12 12   Global Physical Health Score 9 9 9 9 8 11 10   PROMIS TOTAL - SUBSCORES 15 15 15 15 16 23 22       Referral / Collaboration:  Referral to another professional/service is not indicated at this time..    Anticipated number of session for this episode of care: 50  Anticipation frequency of session: Biweekly  Anticipated Duration of each session: 53 or more minutes due to intensity of trauma symptoms  Treatment plan will be reviewed in 90 days or when goals have been changed.   There has been demonstrated improvement in functioning while patient has been engaged in psychotherapy/psychological service- if withdrawn the patient would deteriorate and/or relapse.  "      MeasurableTreatment Goal(s) related to diagnosis / functional impairment(s)  Goal 1: Patient will \"jumpstart, getting going with the things I need to be doing around the house as far as picking up, doing things, trying to do something every day.  Also to lessen the animosity between me and my .\"    I will know I've met my goal when my shoulders are fixed and I can see.      Objective #A (Patient Action)    Patient will increase frequency of engaging her in ADLs.  Status: Continued - Date(s): 12/10/21, 3/9/22, 6/9/22, 9/2/22    Intervention(s)  Therapist will engage patient in CBT, specifically behavioral activation.    Objective #B  Patient will  track and record at least 5 pleasant exchanges with . Patient will be able to identify at least 5 positive traits about her  and how he relates to her.  Status: Continued - Date(s): 12/10/21, 3/9/22, 6/9/22, 9/2/22    Intervention(s)  Therapist will teach assertiveness skills and assign homework related to relationship interactions.    Objective #C  Patient will reduce level of depressive and anxious features as evidenced by reduction in score on her CHAVO-7 and PHQ-9 (scores of 15 and 16 at first measurment, respectively).  Status: Continued - Date(s): 12/10/21, 3/9/22, 6/9/22, 9/2/22    Intervention(s)  Therapist will engage patient in person-centered therapy and CBT.    Patient has reviewed and agreed to the above plan.      MICAELA SLADE  September 2, 2022                                                   Randi Cleary          SAFETY PLAN:  Step 1: Warning signs / cues (Thoughts, images, mood, situation, behavior) that a crisis may be developing:  ? Thoughts: \"I don't want to continue\" \"I am unwanted\"  ? Images: none  ? Thinking Processes: ruminating  ? Mood: anger  ? Behaviors: isolating/withdrawing , can't stop crying, not taking care of myself and not taking care of my responsibilities  ? Situations: small triggers, such as not " "being able to find something, or dropping something   Step 2: Coping strategies - Things I can do to take my mind off of my problems without contacting another person (relaxation technique, physical activity):  ? Distress Tolerance Strategies:  arts and crafts: drawing, play with my pet , listen to positive and upbeat music: any, change body temperature (ice pack/cold water)  and paced breathing/progressive muscle relaxation  ? Physical Activities: go for a walk, deep breathing and stretching   ? Focus on helpful thoughts:  \"You've been through this before, you can get through it again.\"  Step 3: People and social settings that provide distraction:                 Name: Carmen                            Name: Darien                           Name: Aleida       ? pool, shopping, Carmen's house, [employer]       Step 4: Remind myself of people and things that are important to me and worth living for:  Clifford Little Donna, post-COVID world, options of what could be in your future        Step 5: When I am in crisis, I can ask these people to help me use my safety plan:                 Name: Sidney  Step 6: Making the environment safe:   ? go to sleep/daydream  Step 7: Professionals or agencies I can contact during a crisis:  ? [facility] Daytime Number: [phone]  ? Suicide Prevention Lifeline: 3-799-495-GSZY (1651)  ? Crisis Text Line Service (available 24 hours a day, 7 days a week): Text [place] to 657017    Local Crisis Services: [facility] Crisis: [phone]     Call 911 or go to my nearest emergency department.       I helped develop this safety plan and agree to use it when needed.  I have been given a copy of this plan.       Client signature _________________________________________________________________  Today s date:  11/24/2020  Adapted from Safety Plan Template 2008 Randi Poole and Robby Barba is reprinted with the express permission of the authors.  No portion of the Safety Plan " Template may be reproduced without the express, written permission.  You can contact the authors at [email or web address] or [email or web address].

## 2022-09-19 ENCOUNTER — TELEPHONE (OUTPATIENT)
Dept: NEUROSURGERY | Facility: CLINIC | Age: 67
End: 2022-09-19

## 2022-09-19 NOTE — TELEPHONE ENCOUNTER
09/19/22- Patient called to schedule f/u with RAKAN on Dr. Gregory day. Patient was to follow up last in July but decided she did not want to. Put patient on hold while I pulled up the schedule and the call was dropped. Attempted twice to call patient backb ut was sent to davide

## 2022-09-20 ENCOUNTER — VIRTUAL VISIT (OUTPATIENT)
Dept: PSYCHOLOGY | Facility: CLINIC | Age: 67
End: 2022-09-20
Payer: MEDICARE

## 2022-09-20 DIAGNOSIS — F31.81 BIPOLAR 2 DISORDER (H): Primary | ICD-10-CM

## 2022-09-20 DIAGNOSIS — F41.1 GENERALIZED ANXIETY DISORDER: ICD-10-CM

## 2022-09-20 DIAGNOSIS — F43.9 TRAUMA AND STRESSOR-RELATED DISORDER: ICD-10-CM

## 2022-09-20 PROCEDURE — 90837 PSYTX W PT 60 MINUTES: CPT | Mod: 95 | Performed by: SOCIAL WORKER

## 2022-09-20 ASSESSMENT — ANXIETY QUESTIONNAIRES
4. TROUBLE RELAXING: MORE THAN HALF THE DAYS
1. FEELING NERVOUS, ANXIOUS, OR ON EDGE: NEARLY EVERY DAY
GAD7 TOTAL SCORE: 13
3. WORRYING TOO MUCH ABOUT DIFFERENT THINGS: SEVERAL DAYS
6. BECOMING EASILY ANNOYED OR IRRITABLE: NEARLY EVERY DAY
GAD7 TOTAL SCORE: 13
7. FEELING AFRAID AS IF SOMETHING AWFUL MIGHT HAPPEN: SEVERAL DAYS
8. IF YOU CHECKED OFF ANY PROBLEMS, HOW DIFFICULT HAVE THESE MADE IT FOR YOU TO DO YOUR WORK, TAKE CARE OF THINGS AT HOME, OR GET ALONG WITH OTHER PEOPLE?: VERY DIFFICULT
2. NOT BEING ABLE TO STOP OR CONTROL WORRYING: MORE THAN HALF THE DAYS
GAD7 TOTAL SCORE: 13
IF YOU CHECKED OFF ANY PROBLEMS ON THIS QUESTIONNAIRE, HOW DIFFICULT HAVE THESE PROBLEMS MADE IT FOR YOU TO DO YOUR WORK, TAKE CARE OF THINGS AT HOME, OR GET ALONG WITH OTHER PEOPLE: VERY DIFFICULT
5. BEING SO RESTLESS THAT IT IS HARD TO SIT STILL: SEVERAL DAYS
7. FEELING AFRAID AS IF SOMETHING AWFUL MIGHT HAPPEN: SEVERAL DAYS

## 2022-09-20 NOTE — PROGRESS NOTES
[facility]                                     Progress Note    Patient Name: Randi Cleary  Date: 9/20/22         Service Type: Individual     Session Start Time: 11:31 AM  Session End Time: 12:32 AM     Session Length: 61 minutes    Session #: 135    Attendees: Client attended alone    Service Modality:  Video Visit:      Provider verified identity through the following two step process.  Patient provided:  Patient is known previously to provider    Telemedicine Visit: The patient's condition can be safely assessed and treated via synchronous audio and visual telemedicine encounter.      Reason for Telemedicine Visit: Patient convenience (e.g. access to timely appointments / distance to available provider)    Originating Site (Patient Location): Patient's home    Distant Site (Provider Location): Provider Remote Setting- Home Office    Consent:  The patient/guardian has verbally consented to: the potential risks and benefits of telemedicine (video visit) versus in person care; bill my insurance or make self-payment for services provided; and responsibility for payment of non-covered services.     Patient would like the video invitation sent by:  My Chart    Mode of Communication:  Video Conference via [employer]well    As the provider I attest to compliance with applicable laws and regulations related to telemedicine.          DATA  Extended Session (53+ minutes): PROLONGED SERVICE IN THE OUTPATIENT SETTING REQUIRING DIRECT (FACE-TO-FACE) PATIENT CONTACT BEYOND THE USUAL SERVICE:    - Patient's presenting concerns require more intensive intervention than could be completed within the usual service  Interactive Complexity: No  Crisis: No        Progress Since Last Session (Related to Symptoms / Goals / Homework):   Symptoms: High stress and worry    Homework: Progress made  Do a budget  File taxes     Episode of Care Goals: Satisfactory progress - ACTION (Actively working towards change);  Intervened by reinforcing change plan / affirming steps taken     Current / Ongoing Stressors and Concerns:   Patient is currently socially isolated. She has a conflictual relationship with her .  She is getting minimal physical activity.  She had recent eye surgery and shoulder surgery.     Treatment Objective(s) Addressed in This Session:   Patient will increase frequency of engaging her in ADLs.  Patient will track and record at least 5 pleasant exchanges with . Patient will be able to identify at least 5 positive traits about her .  Patient will reduce level of depressive and anxious features as evidenced by reduction in score on her CHAVO-7 and PHQ-9 (scores of 15 and 16 at first measurment, respectively).     Intervention:   Supportive Therapy: Discussed finances and steps patient is taking to build a budget and how this is impacting her mental health. Talked about feeling overwhelmed and wanting to avoid going out. Discussed gains she gets from going out. In the last minute patient reported an email she had drafted to her  about divorce that she had not sent. Agreed to spend more time on this at the next appointment.      The following assessments were completed by patient for this visit:  PHQ9: of note, patient reported she was in a hurry and didn't read all questions, she reports no suicidal ideation  PHQ-9 SCORE 6/30/2022 7/14/2022 7/26/2022 8/9/2022 8/16/2022 9/13/2022 9/20/2022   PHQ-9 Total Score MyChart 17 (Moderately severe depression) 15 (Moderately severe depression) 13 (Moderate depression) 16 (Moderately severe depression) 15 (Moderately severe depression) 7 (Mild depression) 13 (Moderate depression)   PHQ-9 Total Score 17 15 13 16 15 7 13   Some encounter information is confidential and restricted. Go to Review Flowsheets activity to see all data.     GAD7:   CHAVO-7 SCORE 7/14/2022 7/14/2022 7/14/2022 8/9/2022 9/2/2022 9/2/2022 9/20/2022   Total Score - - 13 (moderate  anxiety) 16 (severe anxiety) - 13 (moderate anxiety) 13 (moderate anxiety)   Total Score 13 13 13 16 13 13 13   Some encounter information is confidential and restricted. Go to Review Flowsheets activity to see all data.     PROMIS 10-Global Health (only subscores and total score):   PROMIS-10 Scores Only 3/15/2022 3/24/2022 4/1/2022 6/9/2022/9/2022 9/6/2022 9/6/2022 9/6/2022   Global Mental Health Score 6 8 12 12 7 7 7   Global Physical Health Score 9 8 11 10 9 9 9   PROMIS TOTAL - SUBSCORES 15 16 23 22 16 16 16        ASSESSMENT: Current Emotional / Mental Status (status of significant symptoms):   Risk status (Self / Other harm or suicidal ideation)   Patient denies current fears or concerns for personal safety.   Patient denies current or recent suicidal ideation or behaviors.   Patient denies current or recent homicidal ideation or behaviors.   Patient denies current or recent self injurious behavior or ideation.   Patient denies other safety concerns.   Patient reports there has been no change in risk factors since their last session.     Patient reports there has been no change in protective factors since their last session.     A safety and risk management plan has been developed including: Patient consented to co-developed safety plan on 11/24/2020.  Safety and risk management plan was reviewed.   Patient agreed to use safety plan should any safety concerns arise.  A copy was made available to the patient.     Appearance:   Appropriate    Eye Contact:   Good    Psychomotor Behavior: Normal    Attitude:   Cooperative    Orientation:   All   Speech    Rate / Production: Normal/ Responsive    Volume:  Normal    Mood:    Normal   Affect:    Appropriate    Thought Content:  Clear  Rumination    Thought Form:  Coherent  Obsessive    Insight:    Good      Medication Review:   No changes to current psychiatric medication(s)     Medication Compliance:   Yes     Changes in Health Issues:   None reported     Chemical Use  Review:   Substance Use: Chemical use reviewed, no active concerns identified Nothing used since 2021.     Tobacco Use: No current tobacco use.      Diagnosis:  1. Bipolar 2 disorder (H)    2. Generalized anxiety disorder    3. Trauma and stressor-related disorder      Collateral Reports Completed:   Not Applicable    PLAN: (Patient Tasks / Therapist Tasks / Other)  Next session continue to look at fear of going out/isolation and letter regarding divorce      There has been demonstrated improvement in functioning while patient has been engaged in psychotherapy/psychological service- if withdrawn the patient would deteriorate and/or relapse.     MICAELA SLADE   2022                                                        ______________________________________________________________________    Individual Treatment Plan    Patient's Name: Randi Cleary  YOB: 1955    Date of Creation: 20  Date Treatment Plan Last Reviewed/Revised: 22    DSM5 Diagnoses: 296.89 Bipolar II Disorder Depressed, 300.02 (F41.1) Generalized Anxiety Disorder or Adjustment Disorders  309.89 (F43.8) Other Specified Trauma and Stressor Related Disorder  Psychosocial / Contextual Factors: Patient's entire family of origin has , she now has a sister-in-law and  as support.  Relationship with  is conflictual. She is recovering from surgeries  PROMIS (reviewed every 90 days): PROMIS-10 Scores  PROMIS 10-Global Health (only subscores and total score):   PROMIS-10 Scores Only 2021 3/15/2022 3/15/2022 3/15/2022 3/24/2022 2022 2022   Global Mental Health Score 6 6 6 6 8 12 12   Global Physical Health Score 9 9 9 9 8 11 10   PROMIS TOTAL - SUBSCORES 15 15 15 15 16 23 22       Referral / Collaboration:  Referral to another professional/service is not indicated at this time..    Anticipated number of session for this episode of care: 50  Anticipation frequency of session:  "Biweekly  Anticipated Duration of each session: 53 or more minutes due to intensity of trauma symptoms  Treatment plan will be reviewed in 90 days or when goals have been changed.   There has been demonstrated improvement in functioning while patient has been engaged in psychotherapy/psychological service- if withdrawn the patient would deteriorate and/or relapse.       MeasurableTreatment Goal(s) related to diagnosis / functional impairment(s)  Goal 1: Patient will \"jumpstart, getting going with the things I need to be doing around the house as far as picking up, doing things, trying to do something every day.  Also to lessen the animosity between me and my .\"    I will know I've met my goal when my shoulders are fixed and I can see.      Objective #A (Patient Action)    Patient will increase frequency of engaging her in ADLs.  Status: Continued - Date(s): 12/10/21, 3/9/22, 6/9/22, 9/2/22    Intervention(s)  Therapist will engage patient in CBT, specifically behavioral activation.    Objective #B  Patient will  track and record at least 5 pleasant exchanges with . Patient will be able to identify at least 5 positive traits about her  and how he relates to her.  Status: Continued - Date(s): 12/10/21, 3/9/22, 6/9/22, 9/2/22    Intervention(s)  Therapist will teach assertiveness skills and assign homework related to relationship interactions.    Objective #C  Patient will reduce level of depressive and anxious features as evidenced by reduction in score on her CHAVO-7 and PHQ-9 (scores of 15 and 16 at first measurment, respectively).  Status: Continued - Date(s): 12/10/21, 3/9/22, 6/9/22, 9/2/22    Intervention(s)  Therapist will engage patient in person-centered therapy and CBT.    Patient has reviewed and agreed to the above plan.      MICAELA SLADE  September 2, 2022                                                   Randi Cleary          SAFETY PLAN:  Step 1: Warning signs / cues " "(Thoughts, images, mood, situation, behavior) that a crisis may be developing:  ? Thoughts: \"I don't want to continue\" \"I am unwanted\"  ? Images: none  ? Thinking Processes: ruminating  ? Mood: anger  ? Behaviors: isolating/withdrawing , can't stop crying, not taking care of myself and not taking care of my responsibilities  ? Situations: small triggers, such as not being able to find something, or dropping something   Step 2: Coping strategies - Things I can do to take my mind off of my problems without contacting another person (relaxation technique, physical activity):  ? Distress Tolerance Strategies:  arts and crafts: drawing, play with my pet , listen to positive and upbeat music: any, change body temperature (ice pack/cold water)  and paced breathing/progressive muscle relaxation  ? Physical Activities: go for a walk, deep breathing and stretching   ? Focus on helpful thoughts:  \"You've been through this before, you can get through it again.\"  Step 3: People and social settings that provide distraction:                 Name: Carmen                            Name: [place]en                           Name: Aleida       ? pool, shopping, Carmen's house, [employer]       Step 4: Remind myself of people and things that are important to me and worth living for:  Sidney, Aleida Horton, post-COVID world, options of what could be in your future        Step 5: When I am in crisis, I can ask these people to help me use my safety plan:                 Name: Sidney  Step 6: Making the environment safe:   ? go to sleep/daydream  Step 7: Professionals or agencies I can contact during a crisis:  ? [facility] Daytime Number: [phone]  ? Suicide Prevention Lifeline: 9-699-882-LDIO (2527)  ? Crisis Text Line Service (available 24 hours a day, 7 days a week): Text [place] to 875191    Local Crisis Services: [facility]: [phone]     Call 911 or go to my nearest emergency department.       I helped develop " this safety plan and agree to use it when needed.  I have been given a copy of this plan.       Client signature _________________________________________________________________  Today s date:  11/24/2020  Adapted from Safety Plan Template 2008 Randi Poole and Robby Barba is reprinted with the express permission of the authors.  No portion of the Safety Plan Template may be reproduced without the express, written permission.  You can contact the authors at [email or web address] or [email or web address].

## 2022-09-23 DIAGNOSIS — I27.20 PULMONARY HYPERTENSION (H): ICD-10-CM

## 2022-09-23 DIAGNOSIS — I10 ESSENTIAL HYPERTENSION: ICD-10-CM

## 2022-09-23 DIAGNOSIS — R06.02 SOB (SHORTNESS OF BREATH): ICD-10-CM

## 2022-09-25 NOTE — TELEPHONE ENCOUNTER
"Routing refill request to provider for review/approval because:  A break in medication    Last Written Prescription Date:  3/12/2020  Last Fill Quantity: 90,  # refills: 3   Last office visit provider:  8/31/22     Requested Prescriptions   Pending Prescriptions Disp Refills     KLOR-CON 20 MEQ CR tablet [Pharmacy Med Name: Klor-Con M20 Oral Tablet Extended Release 20 MEQ] 180 tablet 0     Sig: Take 1 tablet (20 mEq total) by mouth 2 (two) times a day.       Potassium Supplements Protocol Passed - 9/23/2022  3:59 PM        Passed - Recent (12 mo) or future (30 days) visit within the authorizing provider's department     Patient has had an office visit with the authorizing provider or a provider within the authorizing providers department within the previous 12 mos or has a future within next 30 days. See \"Patient Info\" tab in inbasket, or \"Choose Columns\" in Meds & Orders section of the refill encounter.              Passed - Medication is active on med list        Passed - Patient is age 18 or older        Passed - Normal serum potassium in past 12 months     Recent Labs   Lab Test 08/31/22  0748   POTASSIUM 4.3                         Pamela Martinez, RN 09/24/22 8:43 PM  "

## 2022-09-26 ENCOUNTER — VIRTUAL VISIT (OUTPATIENT)
Dept: PSYCHOLOGY | Facility: CLINIC | Age: 67
End: 2022-09-26
Payer: MEDICARE

## 2022-09-26 DIAGNOSIS — F43.9 TRAUMA AND STRESSOR-RELATED DISORDER: ICD-10-CM

## 2022-09-26 DIAGNOSIS — F41.1 GENERALIZED ANXIETY DISORDER: ICD-10-CM

## 2022-09-26 DIAGNOSIS — F31.81 BIPOLAR 2 DISORDER (H): Primary | ICD-10-CM

## 2022-09-26 DIAGNOSIS — G95.20 CORD COMPRESSION (H): ICD-10-CM

## 2022-09-26 PROCEDURE — 90837 PSYTX W PT 60 MINUTES: CPT | Mod: 95 | Performed by: SOCIAL WORKER

## 2022-09-26 RX ORDER — POTASSIUM CHLORIDE 1500 MG/1
TABLET, EXTENDED RELEASE ORAL
Qty: 180 TABLET | Refills: 0 | Status: SHIPPED | OUTPATIENT
Start: 2022-09-26 | End: 2023-09-21

## 2022-09-26 RX ORDER — HYDROCODONE BITARTRATE AND ACETAMINOPHEN 5; 325 MG/1; MG/1
1 TABLET ORAL 3 TIMES DAILY PRN
Qty: 70 TABLET | Refills: 0 | Status: SHIPPED | OUTPATIENT
Start: 2022-09-26 | End: 2022-10-31

## 2022-09-26 NOTE — TELEPHONE ENCOUNTER
UDT/CSA = 12/17/2021    Pending Prescriptions:                       Disp   Refills    HYDROcodone-acetaminophen (NORCO) 5-325 M*70 tab*0            Sig: Take 1 tablet by mouth 3 times daily as needed           for breakthrough pain or moderate to severe pain           May fill/start 9/26/22    [place]

## 2022-09-26 NOTE — PROGRESS NOTES
[facility]                                     Progress Note    Patient Name: Randi Cleary  Date: 9/26/22         Service Type: Individual     Session Start Time: 11:31 AM  Session End Time: 12:29 PM     Session Length: 58 minutes    Session #: 136    Attendees: Client attended alone    Service Modality:  Video Visit:      Provider verified identity through the following two step process.  Patient provided:  Patient is known previously to provider    Telemedicine Visit: The patient's condition can be safely assessed and treated via synchronous audio and visual telemedicine encounter.      Reason for Telemedicine Visit: Patient convenience (e.g. access to timely appointments / distance to available provider)    Originating Site (Patient Location): Patient's home    Distant Site (Provider Location): Provider Remote Setting- Home Office    Consent:  The patient/guardian has verbally consented to: the potential risks and benefits of telemedicine (video visit) versus in person care; bill my insurance or make self-payment for services provided; and responsibility for payment of non-covered services.     Patient would like the video invitation sent by:  My Chart    Mode of Communication:  Video Conference via Amwell    As the provider I attest to compliance with applicable laws and regulations related to telemedicine.          DATA  Extended Session (53+ minutes): PROLONGED SERVICE IN THE OUTPATIENT SETTING REQUIRING DIRECT (FACE-TO-FACE) PATIENT CONTACT BEYOND THE USUAL SERVICE:    - Patient's presenting concerns require more intensive intervention than could be completed within the usual service  Interactive Complexity: No  Crisis: No        Progress Since Last Session (Related to Symptoms / Goals / Homework):   Symptoms: High stress and worry    Homework: Progress made     Episode of Care Goals: Satisfactory progress - ACTION (Actively working towards change); Intervened by reinforcing change  plan / affirming steps taken     Current / Ongoing Stressors and Concerns:   Patient is currently socially isolated. She has a conflictual relationship with her .  She is getting minimal physical activity.  She had recent eye surgery and shoulder surgery.     Treatment Objective(s) Addressed in This Session:   Patient will increase frequency of engaging her in ADLs.  Patient will track and record at least 5 pleasant exchanges with . Patient will be able to identify at least 5 positive traits about her .  Patient will reduce level of depressive and anxious features as evidenced by reduction in score on her CHAVO-7 and PHQ-9 (scores of 15 and 16 at first measurment, respectively).     Intervention:   Supportive Therapy: Talked about challenges with her friend group and feeling she can't be open with them. Discussed her frustration with her  and the email she wrote but did not send to him. Talked about how to gain the support and build the connection she needs.    The following assessments were completed by patient for this visit:  PHQ9: of note, patient reported she was in a hurry and didn't read all questions, she reports no suicidal ideation  PHQ-9 SCORE 6/30/2022 7/14/2022 7/26/2022 8/9/2022 8/16/2022 9/13/2022 9/20/2022   PHQ-9 Total Score MyChart 17 (Moderately severe depression) 15 (Moderately severe depression) 13 (Moderate depression) 16 (Moderately severe depression) 15 (Moderately severe depression) 7 (Mild depression) 13 (Moderate depression)   PHQ-9 Total Score 17 15 13 16 15 7 13   Some encounter information is confidential and restricted. Go to Review Flowsheets activity to see all data.     GAD7:   CHAVO-7 SCORE 7/14/2022 7/14/2022 7/14/2022 8/9/2022 9/2/2022 9/2/2022 9/20/2022   Total Score - - 13 (moderate anxiety) 16 (severe anxiety) - 13 (moderate anxiety) 13 (moderate anxiety)   Total Score 13 13 13 16 13 13 13   Some encounter information is confidential and restricted. Go  to Review Flowsheets activity to see all data.     PROMIS 10-Global Health (only subscores and total score):   PROMIS-10 Scores Only 3/15/2022 3/24/2022 4/1/2022 6/9/2022 9/6/2022 9/6/2022 9/6/2022   Global Mental Health Score 6 8 12 12 7 7 7   Global Physical Health Score 9 8 11 10 9 9 9   PROMIS TOTAL - SUBSCORES 15 16 23 22 16 16 16        ASSESSMENT: Current Emotional / Mental Status (status of significant symptoms):   Risk status (Self / Other harm or suicidal ideation)   Patient denies current fears or concerns for personal safety.   Patient denies current or recent suicidal ideation or behaviors.   Patient denies current or recent homicidal ideation or behaviors.   Patient denies current or recent self injurious behavior or ideation.   Patient denies other safety concerns.   Patient reports there has been no change in risk factors since their last session.     Patient reports there has been no change in protective factors since their last session.     A safety and risk management plan has been developed including: Patient consented to co-developed safety plan on 11/24/2020.  Safety and risk management plan was reviewed.   Patient agreed to use safety plan should any safety concerns arise.  A copy was made available to the patient.     Appearance:   Appropriate    Eye Contact:   Good    Psychomotor Behavior: Normal    Attitude:   Cooperative    Orientation:   All   Speech    Rate / Production: Normal/ Responsive    Volume:  Normal    Mood:    Normal   Affect:    Appropriate    Thought Content:  Clear  Rumination    Thought Form:  Coherent  Obsessive    Insight:    Good      Medication Review:   No changes to current psychiatric medication(s)     Medication Compliance:   Yes     Changes in Health Issues:   Yes: reported their ears have started hurting     Chemical Use Review:   Substance Use: Chemical use reviewed, no active concerns identified Nothing used since August 2021.     Tobacco Use: No current tobacco  use.      Diagnosis:  1. Bipolar 2 disorder (H)    2. Generalized anxiety disorder    3. Trauma and stressor-related disorder      Collateral Reports Completed:   Not Applicable    PLAN: (Patient Tasks / Therapist Tasks / Other)  Next session continue to look at fear of going out/isolation      There has been demonstrated improvement in functioning while patient has been engaged in psychotherapy/psychological service- if withdrawn the patient would deteriorate and/or relapse.     CRUZ MICAELA BAKER JO   2022                                                        ______________________________________________________________________    Individual Treatment Plan    Patient's Name: Randi Cleary  YOB: 1955    Date of Creation: 20  Date Treatment Plan Last Reviewed/Revised: 22    DSM5 Diagnoses: 296.89 Bipolar II Disorder Depressed, 300.02 (F41.1) Generalized Anxiety Disorder or Adjustment Disorders  309.89 (F43.8) Other Specified Trauma and Stressor Related Disorder  Psychosocial / Contextual Factors: Patient's entire family of origin has , she now has a sister-in-law and  as support.  Relationship with  is conflictual. She is recovering from surgeries  PROMIS (reviewed every 90 days): PROMIS-10 Scores  PROMIS 10-Global Health (only subscores and total score):   PROMIS-10 Scores Only 2021 3/15/2022 3/15/2022 3/15/2022 3/24/2022 2022 2022   Global Mental Health Score 6 6 6 6 8 12 12   Global Physical Health Score 9 9 9 9 8 11 10   PROMIS TOTAL - SUBSCORES 15 15 15 15 16 23 22       Referral / Collaboration:  Referral to another professional/service is not indicated at this time..    Anticipated number of session for this episode of care: 50  Anticipation frequency of session: Biweekly  Anticipated Duration of each session: 53 or more minutes due to intensity of trauma symptoms  Treatment plan will be reviewed in 90 days or when goals have been  "changed.   There has been demonstrated improvement in functioning while patient has been engaged in psychotherapy/psychological service- if withdrawn the patient would deteriorate and/or relapse.       MeasurableTreatment Goal(s) related to diagnosis / functional impairment(s)  Goal 1: Patient will \"jumpstart, getting going with the things I need to be doing around the house as far as picking up, doing things, trying to do something every day.  Also to lessen the animosity between me and my .\"    I will know I've met my goal when my shoulders are fixed and I can see.      Objective #A (Patient Action)    Patient will increase frequency of engaging her in ADLs.  Status: Continued - Date(s): 12/10/21, 3/9/22, 6/9/22, 9/2/22    Intervention(s)  Therapist will engage patient in CBT, specifically behavioral activation.    Objective #B  Patient will  track and record at least 5 pleasant exchanges with . Patient will be able to identify at least 5 positive traits about her  and how he relates to her.  Status: Continued - Date(s): 12/10/21, 3/9/22, 6/9/22, 9/2/22    Intervention(s)  Therapist will teach assertiveness skills and assign homework related to relationship interactions.    Objective #C  Patient will reduce level of depressive and anxious features as evidenced by reduction in score on her CHAVO-7 and PHQ-9 (scores of 15 and 16 at first measurment, respectively).  Status: Continued - Date(s): 12/10/21, 3/9/22, 6/9/22, 9/2/22    Intervention(s)  Therapist will engage patient in person-centered therapy and CBT.    Patient has reviewed and agreed to the above plan.      MICAELA SLADE  September 2, 2022                                                   Randi Cleary          SAFETY PLAN:  Step 1: Warning signs / cues (Thoughts, images, mood, situation, behavior) that a crisis may be developing:  ? Thoughts: \"I don't want to continue\" \"I am unwanted\"  ? Images: none  ? Thinking " "Processes: ruminating  ? Mood: anger  ? Behaviors: isolating/withdrawing , can't stop crying, not taking care of myself and not taking care of my responsibilities  ? Situations: small triggers, such as not being able to find something, or dropping something   Step 2: Coping strategies - Things I can do to take my mind off of my problems without contacting another person (relaxation technique, physical activity):  ? Distress Tolerance Strategies:  arts and crafts: drawing, play with my pet , listen to positive and upbeat music: any, change body temperature (ice pack/cold water)  and paced breathing/progressive muscle relaxation  ? Physical Activities: go for a walk, deep breathing and stretching   ? Focus on helpful thoughts:  \"You've been through this before, you can get through it again.\"  Step 3: People and social settings that provide distraction:                 Name: Carmen                            Name: Darien                           Name: Aleida       ? pool, shopping, Carmen's house, [employer]       Step 4: Remind myself of people and things that are important to me and worth living for:  Clifford Little Donna, post-COVID world, options of what could be in your future        Step 5: When I am in crisis, I can ask these people to help me use my safety plan:                 Name: Sidney  Step 6: Making the environment safe:   ? go to sleep/daydream  Step 7: Professionals or agencies I can contact during a crisis:  ? [facility] Daytime Number: [phone]  ? Suicide Prevention Lifeline: 0-273-924-UQRG (7563)  ? Crisis Text Line Service (available 24 hours a day, 7 days a week): Text [place] to 107586    Local Crisis Services: [facility] Crisis: [phone]     Call 911 or go to my nearest emergency department.       I helped develop this safety plan and agree to use it when needed.  I have been given a copy of this plan.       Client " signature _________________________________________________________________  Today s date:  11/24/2020  Adapted from Safety Plan Template 2008 Randi Poole and Robby Barba is reprinted with the express permission of the authors.  No portion of the Safety Plan Template may be reproduced without the express, written permission.  You can contact the authors at [email or web address] or [email or web address].

## 2022-09-26 NOTE — TELEPHONE ENCOUNTER
Received fax from pharmacy requesting refill(s) for     HYDROcodone-acetaminophen (NORCO) 5-325 MG tablet    Date last filled 08.24.2022    Last Appt Date:08.12.2022    Next Appt scheduled: 11.09.2022    Pharmacy:     [id number]  [place], [address]  Fax [phone]       Will route to MA POOL for processing    Lorena Loo  [facility] Visit Facilitator

## 2022-09-26 NOTE — TELEPHONE ENCOUNTER
Signed Prescriptions:                        Disp   Refills    HYDROcodone-acetaminophen (NORCO) 5-325 MG*70 tab*0        Sig: Take 1 tablet by mouth 3 times daily as needed for           breakthrough pain or moderate to severe pain May           fill/start 9/26/22  Authorizing Provider: ABAD RUBIN reviewed  and last visit with provider. Refill looks consistent with plan.     Flower Rubin, DION [facility]

## 2022-09-27 NOTE — TELEPHONE ENCOUNTER
Losartan Potassium Oral Tablet 25 MG      Last Written Prescription Date:  10/17/22  Last Fill Quantity: 90,   # refills: 2  Last Office Visit : 3/10/22  Future Office visit:  None    Routing refill request to provider for review/approval because:  Per medication list last ordered by Dr Constantino internal med at [facility]  Dr Neri patient, pulmonary HTN

## 2022-09-29 ENCOUNTER — OFFICE VISIT (OUTPATIENT)
Dept: PULMONOLOGY | Facility: OTHER | Age: 67
End: 2022-09-29
Payer: MEDICARE

## 2022-09-29 VITALS
SYSTOLIC BLOOD PRESSURE: 138 MMHG | WEIGHT: 239.7 LBS | DIASTOLIC BLOOD PRESSURE: 84 MMHG | OXYGEN SATURATION: 95 % | BODY MASS INDEX: 38.69 KG/M2 | HEART RATE: 106 BPM

## 2022-09-29 DIAGNOSIS — J44.9 CHRONIC OBSTRUCTIVE PULMONARY DISEASE, UNSPECIFIED COPD TYPE (H): ICD-10-CM

## 2022-09-29 PROCEDURE — 99213 OFFICE O/P EST LOW 20 MIN: CPT | Performed by: INTERNAL MEDICINE

## 2022-09-29 RX ORDER — ALBUTEROL SULFATE 90 UG/1
2 AEROSOL, METERED RESPIRATORY (INHALATION) EVERY 6 HOURS
Qty: 18 G | Refills: 11 | Status: SHIPPED | OUTPATIENT
Start: 2022-09-29 | End: 2023-11-16

## 2022-09-29 NOTE — PATIENT INSTRUCTIONS
It was a pleasure to see you today.    Continue the trelegy and as needed albuterol.  Continue CPAP as you are able, reach out to your sleep provider regarding any mask fit issues   Get back into the pool for some exercise if you can  I sent Dr Stockton a note regarding help with the inhaler costs.    Follow up 6 months        Reina Morillo MD  Pulmonary and Critical Care Medicine  [facility]  Office: [phone]

## 2022-09-29 NOTE — PROGRESS NOTES
"Pulmonary Clinic Outpatient Progress Note:    Assessment and Plan:   This is a 67 y F with depression/anxiety, alcohol abuse, severe sleep apnea, and possible mild OHS not on CPAP, morbid obesity (BMI 42), 44+ pack year tobacco history in remission, COPD with mild osbtruction, GERD and hiatal hernia, chronic pain on opiods, hypothyroid after tx for Graves, pheochromocytoma resected ~2 years ago, who presents for follow-up of COPD. Also following w Dr. Edwards at the [facility], had RHC consistent with pulmonary venuous HTN, on diuretics.     Recommendations:    Continue trelegy and prn albuterol. Costs of medications/inhalers are a growing issue. Will reach out to PMD for any pharmacy assistance with [employer] assistance, or another regimen.    Tobacco cessation in remission >20 years ago, not a screening candidate    Encouraged following up with sleep regarding CPAP mask fit concerns    Encouraged resumption of regular exercise    Offered flu shot today, she is going to wait until next week.      Follow up with me in 6 months.      Reina Morillo MD  Pulmonary and Critical Care Medicine  [facility]  Office: [phone]    ----------------------    CC: COPD follow up    Interval HPI:   Doing better since I saw her last  On Trelegy and as needed albuterol, using more albuterol for cough intermittently  At least 1 exacerbation since our last visit    Still recovering from cervical spine surgery   No CAT recorded  Using CPAP \"most nights\" has some mask fitting concerns   Hasn't started back at exercise    ---------------------  Lots of medical contact since our last visit, mainly non-pulmonary in nature, no AECOPD since March 2020, until last week.  Has had issues with anxiety, depression, and medications. Pressured speech, and anxious today    Supposed to have cervical spine surgery in 1 week.   Had pre-op visit 10/1, received Moderna booster (?) and flu shot 10/1, since then noted increased productive cough, " dyspnea. She has received a course of azithromycin x2, and prednisone. She notes she feels better, but not at 100%. Has ongoing hoarse cough and PND. She had clear CXR as part of pre-op visit.     Currently using trelegy daily, minimal albuterol use.    Does not want to have surgery next week, notes she does not feel well enough.      -------------------  This is a 64 y F with depression/anxiety, alcohol abuse, severe sleep apnea, not on CPAP, morbid obesity (BMI 42), 44+ pack year tobacco history in remission, COPD, GERD and hiatal hernia, chronic pain on opiods, hypothyroid after tx for Graves, pheochromocytoma resected ~2 years ago, who presents for an evaluation of COPD and exertional dyspnea.     She was very emotionally labile, tangential, and difficult to obtain a history from during our visit.     She reports she has had long standing ANNA. It has gotten worse over time, which she correlates with a 60+ lbs weight gain over the last 2 years. She has a chronic cough productive of clear phlegm. She notes significant depressive symptoms but no SI, feels no energy or motivation. She reports a recent relapse of alcohol abuse, but tells me she has been sober for 1 month however reported at [facility] visit 1 week ago she was drinking 2 bottles of wine daily.    She has severe KYAW per PSG but has not been using CPAP. She was referred to sleep again and has an appointment next month.    For her COPD she hs been on Symbicort intermittently due to cost, she just picked up another supply and has been back on for ~1 month. She is uncertain if it helps. She ran out of albuterol last month. CAT 31 today. Not certain of her last exacerbation. She quit smoking ~20 years ago.    She recently saw Dr. Edwards at [facility] for a pulmonary HTN evaluation. She had an enlarged PA on a CT PE. The echo done there was unable to estimate PA pressure or diastolic function. It showed a mild reduction in RV function and was otherwise normal.  She was referred for a sleep study and to hepatology for a history of possible hereditary hemochromatosis.     ROS:  A 12-system review was obtained and was negative with the exception of the symptoms endorsed in the history of present illness.    PMH:  Past Medical History:   Diagnosis Date     Anxiety      Breast cancer (H) 1994     Chronic pain syndrome      COPD (chronic obstructive pulmonary disease) (H)      Depression      Esophageal reflux      Graves disease      Hemochromatosis      Hx antineoplastic chemotherapy      Hx of radiation therapy      Hypertension      Impaired fasting glucose      Pheochromocytoma      Psoriasis      Psoriatic arthropathy (H)      Restless leg syndrome      Right rotator cuff tear      Sleep apnea 2017    CPAP     Spinal stenosis      Urinary incontinence      Vitamin B12 deficiency      Social Hx:  Tobacco: 2-3 PPD for 22 years, quit >20 years ago  Prior drug abuse, EtOH dependency, had been sober 20 years now relapsed, nothing in the last 1 month  Disabled, then retired, worked in   Currently lives in [place] with   1 dog, 1 cat   No travel in 2 years    Physical Exam:  /84 (BP Location: Right arm, Patient Position: Chair, Cuff Size: Adult Large)   Pulse 106   Wt 108.7 kg (239 lb 11.2 oz)   SpO2 95%   BMI 38.69 kg/m    Gen: Alert,oriented, no distress  HEENT: nasal turbinates are unremarkable, no oropharyngeal lesions, no cervical or supraclavicular lymphadenopathy  CV: RRR, no M/G/R  Resp: Decreased, CTAB, no focal crackles or wheezes  Abd: obese, soft, nontender, no palpable organomegaly  Skin: no apparent rashes  Ext: no cyanosis, clubbing, no edema  Neuro: alert, nonfocal    Labs:  Reviewed  ABG 7.40/43    Imaging studies:  Personally reviewed:    6/24/19 CT PE:  ANGIOGRAM CHEST: Atherosclerotic plaque including coronary artery calcification. No aortic aneurysm or dissection. Mildly prominent right and left main pulmonary arteries suggestive  of pulmonary artery hypertension. No pulmonary emboli.     RV/LV RATIO: N/A     LUNGS AND PLEURA: Stable benign 4 mm pulmonary nodule pleural-based on image #77 at the left lung base. Mild to moderate scattered fibroatelectasis. Diffuse air trapping often associated with small vessel or small airways disease. No effusion.     MEDIASTINUM: Moderate sized hiatal hernia. No mass or adenopathy.     LIMITED UPPER ABDOMEN: Hepatic steatosis. Left adrenal mass on prior not imaged.     MUSCULOSKELETAL: Degenerative disease. Mastectomy with reconstruction as on prior.     CONCLUSION:  1.  Evidence for pulmonary artery hypertension. No pulmonary emboli, aortic aneurysm or dissection. Atherosclerotic plaque including coronary artery disease.  2.  Moderate-sized hiatal hernia.  3.  Hepatic steatosis.  4.  Remainder stable.    8/21/19 PFT's  FEV1/FVC 65 FEV1 72 FVC 87  Negative BD response  TLC 93  RV/  DLCO ralph 81%  FVL has scooping of expiratory limb consistent with obstruction    Mild obstruction  Normal lung volumes  Normal diffusion capacity    Right Heart Cath (from the [facility])  PA  Systolic:41 mmHg  Diasotlic: 24 mmHg  Mean: 31 mmHg  HR: 88 bpm  PA Sat: 70.8%    PCW  A-wave: 18 mmHg  V-wave: 17 mmHg  Mean: 16 mmHg  HR: 91 bpm    Cardiac Output  CO Jeannie: 5.72 L/min  CI Jeannie: 2.56 L/min/m2  CO TD: Not performed  CI TD: Not performed     8/13/19 Echo (care everywhere)  Unable to assess diastolic function  Interpretation Summary  1. Global and regional left ventricular function is normal with an EF of 55-  60%.  2. The right ventricle is normal size. Global right ventricular function is mildly reduced.  3. No significant valvular disease.  4. Unable to assess pulmonary artery pressure.  5. IVC diameter <2.1 cm collapsing >50% with sniff suggests a normal RA  pressure of 3 mmHg.    There is no prior study for direct comparison.

## 2022-10-05 RX ORDER — LOSARTAN POTASSIUM 25 MG/1
TABLET ORAL
Qty: 90 TABLET | Refills: 3 | Status: SHIPPED | OUTPATIENT
Start: 2022-10-05 | End: 2022-11-16

## 2022-10-10 ENCOUNTER — HEALTH MAINTENANCE LETTER (OUTPATIENT)
Age: 67
End: 2022-10-10

## 2022-10-10 ENCOUNTER — TELEPHONE (OUTPATIENT)
Dept: PSYCHOLOGY | Facility: CLINIC | Age: 67
End: 2022-10-10

## 2022-10-10 NOTE — TELEPHONE ENCOUNTER
RECORDS STATUS - BREAST    RECORDS REQUESTED FROM: EPIC   DATE REQUESTED: 10/10/22   NOTES DETAILS STATUS   OFFICE NOTE from referring provider     OFFICE NOTE from medical oncologist Epic 07/11/22: Dr. Tova Pablo   MEDICATION LIST [facility]    LABS     PATHOLOGY REPORTS  (Tissue diagnosis, Stage, ER/CO percentage positive and intensity of staining, HER2 IHC, FISH, and all biopsies from breast and any distant metastasis)                 Reports in EPIC 09/07/22: [id number]  10/24/13: [id number]   IMAGING (NEED IMAGES & REPORT)     MRI Report in EPIC 09/30/10: MR Breast   MAMMO PACS 09/07/22-11/12/14   ULTRASOUND PACS 09/07/22-11/12/14: US Breast   BONE SCAN NilRead 11/12/14

## 2022-10-11 ENCOUNTER — VIRTUAL VISIT (OUTPATIENT)
Dept: PSYCHOLOGY | Facility: CLINIC | Age: 67
End: 2022-10-11
Payer: MEDICARE

## 2022-10-11 ENCOUNTER — OFFICE VISIT (OUTPATIENT)
Dept: PHARMACY | Facility: CLINIC | Age: 67
End: 2022-10-11
Payer: COMMERCIAL

## 2022-10-11 DIAGNOSIS — F43.9 TRAUMA AND STRESSOR-RELATED DISORDER: ICD-10-CM

## 2022-10-11 DIAGNOSIS — F41.1 GENERALIZED ANXIETY DISORDER: ICD-10-CM

## 2022-10-11 DIAGNOSIS — F31.81 BIPOLAR 2 DISORDER (H): Primary | ICD-10-CM

## 2022-10-11 DIAGNOSIS — J44.9 CHRONIC OBSTRUCTIVE PULMONARY DISEASE, UNSPECIFIED COPD TYPE (H): ICD-10-CM

## 2022-10-11 PROCEDURE — 99207 PR NO CHARGE LOS: CPT | Performed by: PHARMACIST

## 2022-10-11 PROCEDURE — 90837 PSYTX W PT 60 MINUTES: CPT | Mod: 95 | Performed by: SOCIAL WORKER

## 2022-10-11 ASSESSMENT — ANXIETY QUESTIONNAIRES
GAD7 TOTAL SCORE: 15
5. BEING SO RESTLESS THAT IT IS HARD TO SIT STILL: MORE THAN HALF THE DAYS
3. WORRYING TOO MUCH ABOUT DIFFERENT THINGS: SEVERAL DAYS
2. NOT BEING ABLE TO STOP OR CONTROL WORRYING: MORE THAN HALF THE DAYS
GAD7 TOTAL SCORE: 15
4. TROUBLE RELAXING: NEARLY EVERY DAY
6. BECOMING EASILY ANNOYED OR IRRITABLE: NEARLY EVERY DAY
8. IF YOU CHECKED OFF ANY PROBLEMS, HOW DIFFICULT HAVE THESE MADE IT FOR YOU TO DO YOUR WORK, TAKE CARE OF THINGS AT HOME, OR GET ALONG WITH OTHER PEOPLE?: VERY DIFFICULT
7. FEELING AFRAID AS IF SOMETHING AWFUL MIGHT HAPPEN: SEVERAL DAYS
7. FEELING AFRAID AS IF SOMETHING AWFUL MIGHT HAPPEN: SEVERAL DAYS
GAD7 TOTAL SCORE: 15
1. FEELING NERVOUS, ANXIOUS, OR ON EDGE: NEARLY EVERY DAY
IF YOU CHECKED OFF ANY PROBLEMS ON THIS QUESTIONNAIRE, HOW DIFFICULT HAVE THESE PROBLEMS MADE IT FOR YOU TO DO YOUR WORK, TAKE CARE OF THINGS AT HOME, OR GET ALONG WITH OTHER PEOPLE: VERY DIFFICULT

## 2022-10-11 NOTE — PROGRESS NOTES
Clinical Pharmacy Consult:                                                    Randi Cleary is a 67 year old female coming in for a clinical pharmacist consult.  She was referred to me from Steve Morillo and Mahin.     Reason for Consult: [employer] patient assistance    Discussion: She presents with the necessary documentation to complete [employer] patient assistance application for Alondra Black.     Plan:  1.  Completed application     Waylon Catherine, PharmD, BCACP  Medication Management (MTM) Pharmacist  [facility]

## 2022-10-11 NOTE — PROGRESS NOTES
[facility]                                     Progress Note    Patient Name: Randi Cleary  Date: 10/11/22         Service Type: Individual     Session Start Time: 8:34 AM  Session End Time: 9:27 AM     Session Length: 53 minutes    Session #: 137    Attendees: Client attended alone    Service Modality:  Video Visit:      Provider verified identity through the following two step process.  Patient provided:  Patient is known previously to provider    Telemedicine Visit: The patient's condition can be safely assessed and treated via synchronous audio and visual telemedicine encounter.      Reason for Telemedicine Visit: Patient convenience (e.g. access to timely appointments / distance to available provider)    Originating Site (Patient Location): Patient's home    Distant Site (Provider Location): Provider Remote Setting- Home Office    Consent:  The patient/guardian has verbally consented to: the potential risks and benefits of telemedicine (video visit) versus in person care; bill my insurance or make self-payment for services provided; and responsibility for payment of non-covered services.     Patient would like the video invitation sent by:  My Chart    Mode of Communication:  Video Conference via [employer]well    As the provider I attest to compliance with applicable laws and regulations related to telemedicine.          DATA  Extended Session (53+ minutes): PROLONGED SERVICE IN THE OUTPATIENT SETTING REQUIRING DIRECT (FACE-TO-FACE) PATIENT CONTACT BEYOND THE USUAL SERVICE:    - Patient's presenting concerns require more intensive intervention than could be completed within the usual service  Interactive Complexity: No  Crisis: No        Progress Since Last Session (Related to Symptoms / Goals / Homework):   Symptoms: Patient has had a difficult last few weeks    Homework: Progress made     Episode of Care Goals: Satisfactory progress - ACTION (Actively working towards change); Intervened  by reinforcing change plan / affirming steps taken     Current / Ongoing Stressors and Concerns:   Patient is currently socially isolated. She has a conflictual relationship with her .  She is getting minimal physical activity.  She had recent eye surgery and shoulder surgery.     Treatment Objective(s) Addressed in This Session:   Patient will increase frequency of engaging her in ADLs.  Patient will track and record at least 5 pleasant exchanges with . Patient will be able to identify at least 5 positive traits about her .  Patient will reduce level of depressive and anxious features as evidenced by reduction in score on her CHAVO-7 and PHQ-9 (scores of 15 and 16 at first measurment, respectively).     Intervention:   Supportive Therapy: Processed provider being out of the office.  Processed events in patient's life and how she was coping without the support of therapy.  Looked at ways she was gaining support from others.  Reviewed tools she has and how to continue using these with the high stressors in her life.    The following assessments were completed by patient for this visit:  PHQ9: of note, patient reported she was in a hurry and didn't read all questions, she reports no suicidal ideation  PHQ-9 SCORE 7/14/2022 7/26/2022 8/9/2022 8/16/2022 9/13/2022 9/20/2022 10/11/2022   PHQ-9 Total Score MyChart 15 (Moderately severe depression) 13 (Moderate depression) 16 (Moderately severe depression) 15 (Moderately severe depression) 7 (Mild depression) 13 (Moderate depression) 13 (Moderate depression)   PHQ-9 Total Score 15 13 16 15 7 13 13   Some encounter information is confidential and restricted. Go to Review Flowsheets activity to see all data.     GAD7:   CHAVO-7 SCORE 7/14/2022 7/14/2022 8/9/2022 9/2/2022 9/2/2022 9/20/2022 10/11/2022   Total Score - 13 (moderate anxiety) 16 (severe anxiety) - 13 (moderate anxiety) 13 (moderate anxiety) 15 (severe anxiety)   Total Score 13 13 16 13 13 13 15    Some encounter information is confidential and restricted. Go to Review Flowsheets activity to see all data.     PROMIS 10-Global Health (only subscores and total score):   PROMIS-10 Scores Only 3/15/2022 3/24/2022 4/1/2022 6/9/2022 9/6/2022 9/6/2022 9/6/2022   Global Mental Health Score 6 8 12 12 7 7 7   Global Physical Health Score 9 8 11 10 9 9 9   PROMIS TOTAL - SUBSCORES 15 16 23 22 16 16 16        ASSESSMENT: Current Emotional / Mental Status (status of significant symptoms):   Risk status (Self / Other harm or suicidal ideation)   Patient denies current fears or concerns for personal safety.   Patient denies current or recent suicidal ideation or behaviors.   Patient denies current or recent homicidal ideation or behaviors.   Patient denies current or recent self injurious behavior or ideation.   Patient denies other safety concerns.   Patient reports there has been no change in risk factors since their last session.     Patient reports there has been no change in protective factors since their last session.     A safety and risk management plan has been developed including: Patient consented to co-developed safety plan on 11/24/2020.  Safety and risk management plan was reviewed.   Patient agreed to use safety plan should any safety concerns arise.  A copy was made available to the patient.     Appearance:   Appropriate    Eye Contact:   Good    Psychomotor Behavior: Normal    Attitude:   Cooperative    Orientation:   All   Speech    Rate / Production: Normal/ Responsive    Volume:  Normal    Mood:    Normal   Affect:    Appropriate    Thought Content:  Clear  Rumination    Thought Form:  Coherent  Obsessive    Insight:    Good      Medication Review:   No changes to current psychiatric medication(s)     Medication Compliance:   Yes     Changes in Health Issues:   None reported     Chemical Use Review:   Substance Use: Chemical use reviewed, no active concerns identified Nothing used since August  .     Tobacco Use: No current tobacco use.      Diagnosis:  1. Bipolar 2 disorder (H)    2. Generalized anxiety disorder    3. Trauma and stressor-related disorder      Collateral Reports Completed:   Not Applicable    PLAN: (Patient Tasks / Therapist Tasks / Other)  Continue to build supports in patient's life      There has been demonstrated improvement in functioning while patient has been engaged in psychotherapy/psychological service- if withdrawn the patient would deteriorate and/or relapse.     CRUZ BAKER MICAELA ARELLANO   2022                                                        ______________________________________________________________________    Individual Treatment Plan    Patient's Name: Randi Cleary  YOB: 1955    Date of Creation: 20  Date Treatment Plan Last Reviewed/Revised: 22    DSM5 Diagnoses: 296.89 Bipolar II Disorder Depressed, 300.02 (F41.1) Generalized Anxiety Disorder or Adjustment Disorders  309.89 (F43.8) Other Specified Trauma and Stressor Related Disorder  Psychosocial / Contextual Factors: Patient's entire family of origin has , she now has a sister-in-law and  as support.  Relationship with  is conflictual. She is recovering from surgeries  PROMIS (reviewed every 90 days): PROMIS-10 Scores  PROMIS 10-Global Health (only subscores and total score):   PROMIS-10 Scores Only 2021 3/15/2022 3/15/2022 3/15/2022 3/24/2022 2022 2022   Global Mental Health Score 6 6 6 6 8 12 12   Global Physical Health Score 9 9 9 9 8 11 10   PROMIS TOTAL - SUBSCORES 15 15 15 15 16 23 22       Referral / Collaboration:  Referral to another professional/service is not indicated at this time..    Anticipated number of session for this episode of care: 50  Anticipation frequency of session: Biweekly  Anticipated Duration of each session: 53 or more minutes due to intensity of trauma symptoms  Treatment plan will be reviewed in 90 days or  "when goals have been changed.   There has been demonstrated improvement in functioning while patient has been engaged in psychotherapy/psychological service- if withdrawn the patient would deteriorate and/or relapse.       MeasurableTreatment Goal(s) related to diagnosis / functional impairment(s)  Goal 1: Patient will \"jumpstart, getting going with the things I need to be doing around the house as far as picking up, doing things, trying to do something every day.  Also to lessen the animosity between me and my .\"    I will know I've met my goal when my shoulders are fixed and I can see.      Objective #A (Patient Action)    Patient will increase frequency of engaging her in ADLs.  Status: Continued - Date(s): 12/10/21, 3/9/22, 6/9/22, 9/2/22    Intervention(s)  Therapist will engage patient in CBT, specifically behavioral activation.    Objective #B  Patient will  track and record at least 5 pleasant exchanges with . Patient will be able to identify at least 5 positive traits about her  and how he relates to her.  Status: Continued - Date(s): 12/10/21, 3/9/22, 6/9/22, 9/2/22    Intervention(s)  Therapist will teach assertiveness skills and assign homework related to relationship interactions.    Objective #C  Patient will reduce level of depressive and anxious features as evidenced by reduction in score on her CHAVO-7 and PHQ-9 (scores of 15 and 16 at first measurment, respectively).  Status: Continued - Date(s): 12/10/21, 3/9/22, 6/9/22, 9/2/22    Intervention(s)  Therapist will engage patient in person-centered therapy and CBT.    Patient has reviewed and agreed to the above plan.      MICAELA SLADE  September 2, 2022                                                   Randi Cleary          SAFETY PLAN:  Step 1: Warning signs / cues (Thoughts, images, mood, situation, behavior) that a crisis may be developing:  ? Thoughts: \"I don't want to continue\" \"I am " "unwanted\"  ? Images: none  ? Thinking Processes: ruminating  ? Mood: anger  ? Behaviors: isolating/withdrawing , can't stop crying, not taking care of myself and not taking care of my responsibilities  ? Situations: small triggers, such as not being able to find something, or dropping something   Step 2: Coping strategies - Things I can do to take my mind off of my problems without contacting another person (relaxation technique, physical activity):  ? Distress Tolerance Strategies:  arts and crafts: drawing, play with my pet , listen to positive and upbeat music: any, change body temperature (ice pack/cold water)  and paced breathing/progressive muscle relaxation  ? Physical Activities: go for a walk, deep breathing and stretching   ? Focus on helpful thoughts:  \"You've been through this before, you can get through it again.\"  Step 3: People and social settings that provide distraction:                 Name: Carmen                            Name: Darien                           Name: Aleida       ? pool, shopping, Carmen's house, [employer]       Step 4: Remind myself of people and things that are important to me and worth living for:  Clifford Little Donna, post-COVID world, options of what could be in your future        Step 5: When I am in crisis, I can ask these people to help me use my safety plan:                 Name: Sidney  Step 6: Making the environment safe:   ? go to sleep/daydream  Step 7: Professionals or agencies I can contact during a crisis:  ? [facility] Daytime Number: [phone]  ? Suicide Prevention Lifeline: 3-166-432-FWDJ (1527)  ? Crisis Text Line Service (available 24 hours a day, 7 days a week): Text [place] to 763587    Local Crisis Services: [facility] Crisis: [phone]     Call 911 or go to my nearest emergency department.       I helped develop this safety plan and agree to use it when needed.  I have been given a copy of this plan.       Client " signature _________________________________________________________________  Today s date:  11/24/2020  Adapted from Safety Plan Template 2008 Randi Poole and Robby Barba is reprinted with the express permission of the authors.  No portion of the Safety Plan Template may be reproduced without the express, written permission.  You can contact the authors at [email or web address] or [email or web address].

## 2022-10-12 ENCOUNTER — PRE VISIT (OUTPATIENT)
Dept: SURGERY | Facility: CLINIC | Age: 67
End: 2022-10-12

## 2022-10-13 ENCOUNTER — VIRTUAL VISIT (OUTPATIENT)
Dept: PSYCHOLOGY | Facility: CLINIC | Age: 67
End: 2022-10-13
Payer: MEDICARE

## 2022-10-13 DIAGNOSIS — F43.9 TRAUMA AND STRESSOR-RELATED DISORDER: ICD-10-CM

## 2022-10-13 DIAGNOSIS — F31.81 BIPOLAR 2 DISORDER (H): Primary | ICD-10-CM

## 2022-10-13 DIAGNOSIS — F41.1 GENERALIZED ANXIETY DISORDER: ICD-10-CM

## 2022-10-13 PROCEDURE — 90837 PSYTX W PT 60 MINUTES: CPT | Mod: 95 | Performed by: SOCIAL WORKER

## 2022-10-13 ASSESSMENT — PATIENT HEALTH QUESTIONNAIRE - PHQ9
SUM OF ALL RESPONSES TO PHQ QUESTIONS 1-9: 17
SUM OF ALL RESPONSES TO PHQ QUESTIONS 1-9: 17
10. IF YOU CHECKED OFF ANY PROBLEMS, HOW DIFFICULT HAVE THESE PROBLEMS MADE IT FOR YOU TO DO YOUR WORK, TAKE CARE OF THINGS AT HOME, OR GET ALONG WITH OTHER PEOPLE: VERY DIFFICULT

## 2022-10-13 NOTE — PROGRESS NOTES
[facility]                                     Progress Note    Patient Name: Randi Cleary  Date: 10/13/22         Service Type: Individual     Session Start Time: 2:03 PM  Session End Time: 2:57 PM     Session Length: 54 minutes    Session #: 138    Attendees: Client attended alone    Service Modality:  Video Visit:      Provider verified identity through the following two step process.  Patient provided:  Patient is known previously to provider    Telemedicine Visit: The patient's condition can be safely assessed and treated via synchronous audio and visual telemedicine encounter.      Reason for Telemedicine Visit: Patient convenience (e.g. access to timely appointments / distance to available provider)    Originating Site (Patient Location): Patient's home    Distant Site (Provider Location): Provider Remote Setting- Home Office    Consent:  The patient/guardian has verbally consented to: the potential risks and benefits of telemedicine (video visit) versus in person care; bill my insurance or make self-payment for services provided; and responsibility for payment of non-covered services.     Patient would like the video invitation sent by:  My Chart    Mode of Communication:  Video Conference via Amwell    As the provider I attest to compliance with applicable laws and regulations related to telemedicine.          DATA  Extended Session (53+ minutes): PROLONGED SERVICE IN THE OUTPATIENT SETTING REQUIRING DIRECT (FACE-TO-FACE) PATIENT CONTACT BEYOND THE USUAL SERVICE:    - Patient's presenting concerns require more intensive intervention than could be completed within the usual service  Interactive Complexity: No  Crisis: No        Progress Since Last Session (Related to Symptoms / Goals / Homework):   Symptoms: Patient has struggled with low motivation    Homework: Progress made     Episode of Care Goals: Satisfactory progress - ACTION (Actively working towards change); Intervened by  reinforcing change plan / affirming steps taken     Current / Ongoing Stressors and Concerns:   Patient is currently socially isolated. She has a conflictual relationship with her .  She is getting minimal physical activity.  She had recent eye surgery and shoulder surgery.     Treatment Objective(s) Addressed in This Session:   Patient will increase frequency of engaging her in ADLs.  Patient will track and record at least 5 pleasant exchanges with . Patient will be able to identify at least 5 positive traits about her .  Patient will reduce level of depressive and anxious features as evidenced by reduction in score on her CHAVO-7 and PHQ-9 (scores of 15 and 16 at first measurment, respectively).     Intervention:   Supportive Therapy: Processed patient's ongoing challenges with motivation and mood.  Discussed the possibility of day treatment, provider placed referral for this and patient would like to pursue.  Discussed symptomology including the impulse to invest and how that has been negatively impacting her.  Discussed discussed goals she would like to work on while provider is on a leave of absence, reviewed ways to get support outside of his provider.      The following assessments were completed by patient for this visit:  PHQ9: of note, patient reported she was in a hurry and didn't read all questions, she reports no suicidal ideation  PHQ-9 SCORE 7/26/2022 8/9/2022 8/16/2022 9/13/2022 9/20/2022 10/11/2022 10/13/2022   PHQ-9 Total Score MyChart 13 (Moderate depression) 16 (Moderately severe depression) 15 (Moderately severe depression) 7 (Mild depression) 13 (Moderate depression) 13 (Moderate depression) 17 (Moderately severe depression)   PHQ-9 Total Score 13 16 15 7 13 13 17   Some encounter information is confidential and restricted. Go to Review Flowsheets activity to see all data.     GAD7:   CHAVO-7 SCORE 7/14/2022 7/14/2022 8/9/2022 9/2/2022 9/2/2022 9/20/2022 10/11/2022   Total  Score - 13 (moderate anxiety) 16 (severe anxiety) - 13 (moderate anxiety) 13 (moderate anxiety) 15 (severe anxiety)   Total Score 13 13 16 13 13 13 15   Some encounter information is confidential and restricted. Go to Review Flowsheets activity to see all data.     PROMIS 10-Global Health (only subscores and total score):   PROMIS-10 Scores Only 3/15/2022 3/24/2022 4/1/2022 6/9/2022 9/6/2022 9/6/2022 9/6/2022   Global Mental Health Score 6 8 12 12 7 7 7   Global Physical Health Score 9 8 11 10 9 9 9   PROMIS TOTAL - SUBSCORES 15 16 23 22 16 16 16        ASSESSMENT: Current Emotional / Mental Status (status of significant symptoms):   Risk status (Self / Other harm or suicidal ideation)   Patient denies current fears or concerns for personal safety.   Patient denies current or recent suicidal ideation or behaviors.   Patient denies current or recent homicidal ideation or behaviors.   Patient denies current or recent self injurious behavior or ideation.   Patient denies other safety concerns.   Patient reports there has been no change in risk factors since their last session.     Patient reports there has been no change in protective factors since their last session.     A safety and risk management plan has been developed including: Patient consented to co-developed safety plan on 11/24/2020.  Safety and risk management plan was reviewed.   Patient agreed to use safety plan should any safety concerns arise.  A copy was made available to the patient.     Appearance:   Appropriate    Eye Contact:   Good    Psychomotor Behavior: Normal    Attitude:   Cooperative    Orientation:   All   Speech    Rate / Production: Normal/ Responsive    Volume:  Normal    Mood:    Normal   Affect:    Appropriate    Thought Content:  Clear  Rumination    Thought Form:  Coherent  Obsessive    Insight:    Good      Medication Review:   No changes to current psychiatric medication(s)     Medication Compliance:   Yes     Changes in Health  Issues:   None reported     Chemical Use Review:   Substance Use: Chemical use reviewed, no active concerns identified Nothing used since 2021.     Tobacco Use: No current tobacco use.      Diagnosis:  1. Bipolar 2 disorder (H)    2. Generalized anxiety disorder    3. Trauma and stressor-related disorder      Collateral Reports Completed:   Not Applicable    PLAN: (Patient Tasks / Therapist Tasks / Other)  Get to the pool  Journal daily  Talk to next door neighbor  Follow through on day treatment referral      There has been demonstrated improvement in functioning while patient has been engaged in psychotherapy/psychological service- if withdrawn the patient would deteriorate and/or relapse.     MICAELA SLADE   2022                                                        ______________________________________________________________________    Individual Treatment Plan    Patient's Name: Randi Cleary  YOB: 1955    Date of Creation: 20  Date Treatment Plan Last Reviewed/Revised: 22    DSM5 Diagnoses: 296.89 Bipolar II Disorder Depressed, 300.02 (F41.1) Generalized Anxiety Disorder or Adjustment Disorders  309.89 (F43.8) Other Specified Trauma and Stressor Related Disorder  Psychosocial / Contextual Factors: Patient's entire family of origin has , she now has a sister-in-law and  as support.  Relationship with  is conflictual. She is recovering from surgeries  PROMIS (reviewed every 90 days): PROMIS-10 Scores  PROMIS 10-Global Health (only subscores and total score):   PROMIS-10 Scores Only 2021 3/15/2022 3/15/2022 3/15/2022 3/24/2022 2022 2022   Global Mental Health Score 6 6 6 6 8 12 12   Global Physical Health Score 9 9 9 9 8 11 10   PROMIS TOTAL - SUBSCORES 15 15 15 15 16 23 22       Referral / Collaboration:  Referral to another professional/service is not indicated at this time..    Anticipated number of session for this episode  "of care: 50  Anticipation frequency of session: Biweekly  Anticipated Duration of each session: 53 or more minutes due to intensity of trauma symptoms  Treatment plan will be reviewed in 90 days or when goals have been changed.   There has been demonstrated improvement in functioning while patient has been engaged in psychotherapy/psychological service- if withdrawn the patient would deteriorate and/or relapse.       MeasurableTreatment Goal(s) related to diagnosis / functional impairment(s)  Goal 1: Patient will \"jumpstart, getting going with the things I need to be doing around the house as far as picking up, doing things, trying to do something every day.  Also to lessen the animosity between me and my .\"    I will know I've met my goal when my shoulders are fixed and I can see.      Objective #A (Patient Action)    Patient will increase frequency of engaging her in ADLs.  Status: Continued - Date(s): 12/10/21, 3/9/22, 6/9/22, 9/2/22    Intervention(s)  Therapist will engage patient in CBT, specifically behavioral activation.    Objective #B  Patient will  track and record at least 5 pleasant exchanges with . Patient will be able to identify at least 5 positive traits about her  and how he relates to her.  Status: Continued - Date(s): 12/10/21, 3/9/22, 6/9/22, 9/2/22    Intervention(s)  Therapist will teach assertiveness skills and assign homework related to relationship interactions.    Objective #C  Patient will reduce level of depressive and anxious features as evidenced by reduction in score on her CHAVO-7 and PHQ-9 (scores of 15 and 16 at first measurment, respectively).  Status: Continued - Date(s): 12/10/21, 3/9/22, 6/9/22, 9/2/22    Intervention(s)  Therapist will engage patient in person-centered therapy and CBT.    Patient has reviewed and agreed to the above plan.      MICAELA SLADE  September 2, 2022                                                   Randi Morgan" "Evin          SAFETY PLAN:  Step 1: Warning signs / cues (Thoughts, images, mood, situation, behavior) that a crisis may be developing:  ? Thoughts: \"I don't want to continue\" \"I am unwanted\"  ? Images: none  ? Thinking Processes: ruminating  ? Mood: anger  ? Behaviors: isolating/withdrawing , can't stop crying, not taking care of myself and not taking care of my responsibilities  ? Situations: small triggers, such as not being able to find something, or dropping something   Step 2: Coping strategies - Things I can do to take my mind off of my problems without contacting another person (relaxation technique, physical activity):  ? Distress Tolerance Strategies:  arts and crafts: drawing, play with my pet , listen to positive and upbeat music: any, change body temperature (ice pack/cold water)  and paced breathing/progressive muscle relaxation  ? Physical Activities: go for a walk, deep breathing and stretching   ? Focus on helpful thoughts:  \"You've been through this before, you can get through it again.\"  Step 3: People and social settings that provide distraction:                 Name: Carmen                            Name: Darien                           Name: Aleida       ? pool, shopping, Carmen's house, [employer]       Step 4: Remind myself of people and things that are important to me and worth living for:  Clifford Little Donna, post-COVID world, options of what could be in your future        Step 5: When I am in crisis, I can ask these people to help me use my safety plan:                 Name: Sidney  Step 6: Making the environment safe:   ? go to sleep/daydream  Step 7: Professionals or agencies I can contact during a crisis:  ? [facility] Daytime Number: [phone]  ? Suicide Prevention Lifeline: 2-001-415-TALK (0047)  ? Crisis Text Line Service (available 24 hours a day, 7 days a week): Text [place] to 548997    Local Crisis Services: [facility] Crisis: [phone]     Call 911 or go to " my nearest emergency department.       I helped develop this safety plan and agree to use it when needed.  I have been given a copy of this plan.       Client signature _________________________________________________________________  Today s date:  11/24/2020  Adapted from Safety Plan Template 2008 Randi Poole and Robby Barba is reprinted with the express permission of the authors.  No portion of the Safety Plan Template may be reproduced without the express, written permission.  You can contact the authors at [email or web address] or [email or web address].

## 2022-10-31 ENCOUNTER — TELEPHONE (OUTPATIENT)
Dept: PALLIATIVE MEDICINE | Facility: OTHER | Age: 67
End: 2022-10-31

## 2022-10-31 DIAGNOSIS — G95.20 CORD COMPRESSION (H): ICD-10-CM

## 2022-10-31 RX ORDER — HYDROCODONE BITARTRATE AND ACETAMINOPHEN 5; 325 MG/1; MG/1
1 TABLET ORAL 3 TIMES DAILY PRN
Qty: 70 TABLET | Refills: 0 | Status: SHIPPED | OUTPATIENT
Start: 2022-10-31 | End: 2022-11-02

## 2022-10-31 NOTE — TELEPHONE ENCOUNTER
Received call from patient requesting refill  Hydrocodone 5/325 mg     Last dispensed from pharmacy on 9/27/22-24 days    Patient's last office/virtual visit by prescribing provider on 8/12/22  Next office/virtual appointment scheduled for 11/9/22    UDT/CSA = 12/17/2021    Pending Prescriptions:                       Disp   Refills    HYDROcodone-acetaminophen (NORCO) 5-325 M*70 tab*0            Sig: Take 1 tablet by mouth 3 times daily as needed           for breakthrough pain or moderate to severe pain           Dispense/start: 10/31/22    [facility]

## 2022-10-31 NOTE — TELEPHONE ENCOUNTER
[facility]    Phone Message    May a detailed message be left on voicemail: yes     Reason for Call: Medication Refill Request    Has the patient contacted the pharmacy for the refill? Yes   Name of medication being requested:HYDROcodone-acetaminophen (NORCO) 5-325 MG tablet  Provider who prescribed the medication: Dr Dubois  Pharmacy:[id number] [place] [address]  Date medication is needed: 11/1/2022  Patient stated she has one dose left      Action Taken: Message routed to:  Other: [facility]    Travel Screening: Not Applicable

## 2022-11-02 RX ORDER — HYDROCODONE BITARTRATE AND ACETAMINOPHEN 5; 325 MG/1; MG/1
1 TABLET ORAL 3 TIMES DAILY PRN
Qty: 70 TABLET | Refills: 0 | Status: SHIPPED | OUTPATIENT
Start: 2022-11-02 | End: 2022-11-16

## 2022-11-02 NOTE — TELEPHONE ENCOUNTER
[facility]    Phone Message    May a detailed message be left on voicemail: yes     Reason for Call: Other: Patient called checking on the status of HYDROcodone-acetaminophen (NORCO) 5-325 MG tablet, stating she is out today 11/2/2022      Action Taken: Other: [facility]    Travel Screening: Not Applicable

## 2022-11-02 NOTE — TELEPHONE ENCOUNTER
Received call from patient requesting refill  Hydrocodone 5/325 mg     Last dispensed from pharmacy on 9/27/22-24 days    Patient's last office/virtual visit by prescribing provider on 8/12/22  Next office/virtual appointment scheduled for 11/9/22    UDT/CSA = 12/17/2021    HYDROcodone-acetaminophen (NORCO) 5-325 MG tablet 70 tablet 0 10/31/2022  No   Sig - Route: Take 1 tablet by mouth 3 times daily as needed for breakthrough pain or moderate to severe pain Dispense/start: 10/31/22 - Oral   Sent to pharmacy as: HYDROcodone-Acetaminophen 5-325 MG Oral Tablet (NORCO)   Class: E-Prescribe   Earliest Fill Date: 10/31/2022   Order: [id number]   E-Prescribing Status: Receipt confirmed by pharmacy (10/31/2022  3:42 PM CDT)     This refill is already at the pharmacy

## 2022-11-02 NOTE — TELEPHONE ENCOUNTER
[facility] Call Center    Phone Message    May a detailed message be left on voicemail: yes     Reason for Call: Other: Patient called stating she saw on MyChart that her Rx was sent to [facility] on 10/31/2022 but when she called their today, they said it was not there. Patient requesting Rx to be sent to [id number] - [place], [place] - [address]. Patient is distressed and stated she has been very confused and upset by the situation. Patient wanted writer to remind team that she is completely out of medication.    Action Taken: Message routed to:  Other: [facility]    Travel Screening: Not Applicable

## 2022-11-02 NOTE — TELEPHONE ENCOUNTER
Will cue for different pharmacy:  Pending Prescriptions:                       Disp   Refills    HYDROcodone-acetaminophen (NORCO) 5-325 M*70 tab*0            Sig: Take 1 tablet by mouth 3 times daily as needed           for breakthrough pain or moderate to severe pain           Dispense/start: 10/31/22    Signed Prescriptions:                        Disp   Refills    HYDROcodone-acetaminophen (NORCO) 5-325 MG*70 tab*0        Sig: Take 1 tablet by mouth 3 times daily as needed for           breakthrough pain or moderate to severe pain           Dispense/start: 10/31/22  Authorizing Provider: AXEL WIGGINS

## 2022-11-04 ENCOUNTER — LAB (OUTPATIENT)
Dept: LAB | Facility: CLINIC | Age: 67
End: 2022-11-04
Payer: MEDICARE

## 2022-11-04 ENCOUNTER — IMMUNIZATION (OUTPATIENT)
Dept: NURSING | Facility: CLINIC | Age: 67
End: 2022-11-04
Payer: MEDICARE

## 2022-11-04 DIAGNOSIS — R74.8 ABNORMAL LEVELS OF OTHER SERUM ENZYMES: ICD-10-CM

## 2022-11-04 DIAGNOSIS — E83.119 HEMOCHROMATOSIS, UNSPECIFIED HEMOCHROMATOSIS TYPE: ICD-10-CM

## 2022-11-04 LAB
BASOPHILS # BLD AUTO: 0 10E3/UL (ref 0–0.2)
BASOPHILS NFR BLD AUTO: 1 %
EOSINOPHIL # BLD AUTO: 0.1 10E3/UL (ref 0–0.7)
EOSINOPHIL NFR BLD AUTO: 2 %
ERYTHROCYTE [DISTWIDTH] IN BLOOD BY AUTOMATED COUNT: 12.3 % (ref 10–15)
FERRITIN SERPL-MCNC: 85 NG/ML (ref 11–328)
HCT VFR BLD AUTO: 50.7 % (ref 35–47)
HGB BLD-MCNC: 17.3 G/DL (ref 11.7–15.7)
IMM GRANULOCYTES # BLD: 0 10E3/UL
IMM GRANULOCYTES NFR BLD: 0 %
IRON BINDING CAPACITY (ROCHE): 322 UG/DL (ref 240–430)
IRON SATN MFR SERPL: 62 % (ref 15–46)
IRON SERPL-MCNC: 201 UG/DL (ref 37–145)
LYMPHOCYTES # BLD AUTO: 1.6 10E3/UL (ref 0.8–5.3)
LYMPHOCYTES NFR BLD AUTO: 25 %
MCH RBC QN AUTO: 32.7 PG (ref 26.5–33)
MCHC RBC AUTO-ENTMCNC: 34.1 G/DL (ref 31.5–36.5)
MCV RBC AUTO: 96 FL (ref 78–100)
MONOCYTES # BLD AUTO: 0.5 10E3/UL (ref 0–1.3)
MONOCYTES NFR BLD AUTO: 7 %
NEUTROPHILS # BLD AUTO: 4.2 10E3/UL (ref 1.6–8.3)
NEUTROPHILS NFR BLD AUTO: 65 %
PLATELET # BLD AUTO: 207 10E3/UL (ref 150–450)
RBC # BLD AUTO: 5.29 10E6/UL (ref 3.8–5.2)
WBC # BLD AUTO: 6.5 10E3/UL (ref 4–11)

## 2022-11-04 PROCEDURE — 82525 ASSAY OF COPPER: CPT | Mod: 90

## 2022-11-04 PROCEDURE — 83550 IRON BINDING TEST: CPT

## 2022-11-04 PROCEDURE — 99000 SPECIMEN HANDLING OFFICE-LAB: CPT

## 2022-11-04 PROCEDURE — G0008 ADMIN INFLUENZA VIRUS VAC: HCPCS

## 2022-11-04 PROCEDURE — 85025 COMPLETE CBC W/AUTO DIFF WBC: CPT

## 2022-11-04 PROCEDURE — 83540 ASSAY OF IRON: CPT

## 2022-11-04 PROCEDURE — 36415 COLL VENOUS BLD VENIPUNCTURE: CPT

## 2022-11-04 PROCEDURE — 85045 AUTOMATED RETICULOCYTE COUNT: CPT

## 2022-11-04 PROCEDURE — 82728 ASSAY OF FERRITIN: CPT

## 2022-11-04 PROCEDURE — 90662 IIV NO PRSV INCREASED AG IM: CPT

## 2022-11-05 LAB
RETICS # AUTO: 0.11 10E6/UL (ref 0.01–0.11)
RETICS/RBC NFR AUTO: 2 % (ref 0.8–2.7)

## 2022-11-07 ENCOUNTER — MYC MEDICAL ADVICE (OUTPATIENT)
Dept: ONCOLOGY | Facility: CLINIC | Age: 67
End: 2022-11-07

## 2022-11-07 DIAGNOSIS — E83.119 HEMOCHROMATOSIS, UNSPECIFIED HEMOCHROMATOSIS TYPE: Primary | ICD-10-CM

## 2022-11-07 LAB — COPPER SERPL-MCNC: 99.6 UG/DL

## 2022-11-09 ENCOUNTER — TELEPHONE (OUTPATIENT)
Dept: PALLIATIVE MEDICINE | Facility: OTHER | Age: 67
End: 2022-11-09

## 2022-11-09 NOTE — TELEPHONE ENCOUNTER
"[facility] Call Center    Phone Message    May a detailed message be left on voicemail: yes     Reason for Call: Pt is requesting to change today's appointment to a virtual.  Please call her back to let her know if that's ok.  She said she's feeling \"down because of the weather\".  Thanks.                                             "

## 2022-11-09 NOTE — TELEPHONE ENCOUNTER
Talked to Dr. Dubois, patient is needing to come in or reschedule,  Pt is rescheduled 11/16 in clinic.  Pt recently received the Booster injection and she fallen ill form the injection, Provider is aware of the changes.

## 2022-11-10 ENCOUNTER — VIRTUAL VISIT (OUTPATIENT)
Dept: PSYCHOLOGY | Facility: CLINIC | Age: 67
End: 2022-11-10
Payer: MEDICARE

## 2022-11-10 DIAGNOSIS — F31.81 BIPOLAR 2 DISORDER (H): Primary | ICD-10-CM

## 2022-11-10 DIAGNOSIS — F43.9 TRAUMA AND STRESSOR-RELATED DISORDER: ICD-10-CM

## 2022-11-10 DIAGNOSIS — F41.1 GENERALIZED ANXIETY DISORDER: ICD-10-CM

## 2022-11-10 DIAGNOSIS — E83.119 HEMOCHROMATOSIS, UNSPECIFIED HEMOCHROMATOSIS TYPE: Primary | ICD-10-CM

## 2022-11-10 PROCEDURE — 90837 PSYTX W PT 60 MINUTES: CPT | Mod: 95 | Performed by: SOCIAL WORKER

## 2022-11-10 ASSESSMENT — ANXIETY QUESTIONNAIRES
5. BEING SO RESTLESS THAT IT IS HARD TO SIT STILL: MORE THAN HALF THE DAYS
7. FEELING AFRAID AS IF SOMETHING AWFUL MIGHT HAPPEN: SEVERAL DAYS
2. NOT BEING ABLE TO STOP OR CONTROL WORRYING: NEARLY EVERY DAY
GAD7 TOTAL SCORE: 17
7. FEELING AFRAID AS IF SOMETHING AWFUL MIGHT HAPPEN: SEVERAL DAYS
4. TROUBLE RELAXING: NEARLY EVERY DAY
6. BECOMING EASILY ANNOYED OR IRRITABLE: NEARLY EVERY DAY
GAD7 TOTAL SCORE: 17
IF YOU CHECKED OFF ANY PROBLEMS ON THIS QUESTIONNAIRE, HOW DIFFICULT HAVE THESE PROBLEMS MADE IT FOR YOU TO DO YOUR WORK, TAKE CARE OF THINGS AT HOME, OR GET ALONG WITH OTHER PEOPLE: VERY DIFFICULT
6. BECOMING EASILY ANNOYED OR IRRITABLE: NEARLY EVERY DAY
1. FEELING NERVOUS, ANXIOUS, OR ON EDGE: NEARLY EVERY DAY
2. NOT BEING ABLE TO STOP OR CONTROL WORRYING: NEARLY EVERY DAY
IF YOU CHECKED OFF ANY PROBLEMS ON THIS QUESTIONNAIRE, HOW DIFFICULT HAVE THESE PROBLEMS MADE IT FOR YOU TO DO YOUR WORK, TAKE CARE OF THINGS AT HOME, OR GET ALONG WITH OTHER PEOPLE: VERY DIFFICULT
3. WORRYING TOO MUCH ABOUT DIFFERENT THINGS: MORE THAN HALF THE DAYS
IF YOU CHECKED OFF ANY PROBLEMS ON THIS QUESTIONNAIRE, HOW DIFFICULT HAVE THESE PROBLEMS MADE IT FOR YOU TO DO YOUR WORK, TAKE CARE OF THINGS AT HOME, OR GET ALONG WITH OTHER PEOPLE: VERY DIFFICULT
1. FEELING NERVOUS, ANXIOUS, OR ON EDGE: NEARLY EVERY DAY
5. BEING SO RESTLESS THAT IT IS HARD TO SIT STILL: MORE THAN HALF THE DAYS
7. FEELING AFRAID AS IF SOMETHING AWFUL MIGHT HAPPEN: SEVERAL DAYS
GAD7 TOTAL SCORE: 17
7. FEELING AFRAID AS IF SOMETHING AWFUL MIGHT HAPPEN: SEVERAL DAYS
GAD7 TOTAL SCORE: 17
GAD7 TOTAL SCORE: 17
7. FEELING AFRAID AS IF SOMETHING AWFUL MIGHT HAPPEN: SEVERAL DAYS
8. IF YOU CHECKED OFF ANY PROBLEMS, HOW DIFFICULT HAVE THESE MADE IT FOR YOU TO DO YOUR WORK, TAKE CARE OF THINGS AT HOME, OR GET ALONG WITH OTHER PEOPLE?: VERY DIFFICULT
GAD7 TOTAL SCORE: 17
4. TROUBLE RELAXING: NEARLY EVERY DAY
GAD7 TOTAL SCORE: 17
GAD7 TOTAL SCORE: 17
2. NOT BEING ABLE TO STOP OR CONTROL WORRYING: NEARLY EVERY DAY
8. IF YOU CHECKED OFF ANY PROBLEMS, HOW DIFFICULT HAVE THESE MADE IT FOR YOU TO DO YOUR WORK, TAKE CARE OF THINGS AT HOME, OR GET ALONG WITH OTHER PEOPLE?: VERY DIFFICULT
5. BEING SO RESTLESS THAT IT IS HARD TO SIT STILL: MORE THAN HALF THE DAYS
4. TROUBLE RELAXING: NEARLY EVERY DAY
8. IF YOU CHECKED OFF ANY PROBLEMS, HOW DIFFICULT HAVE THESE MADE IT FOR YOU TO DO YOUR WORK, TAKE CARE OF THINGS AT HOME, OR GET ALONG WITH OTHER PEOPLE?: VERY DIFFICULT
3. WORRYING TOO MUCH ABOUT DIFFERENT THINGS: MORE THAN HALF THE DAYS
7. FEELING AFRAID AS IF SOMETHING AWFUL MIGHT HAPPEN: SEVERAL DAYS
1. FEELING NERVOUS, ANXIOUS, OR ON EDGE: NEARLY EVERY DAY
3. WORRYING TOO MUCH ABOUT DIFFERENT THINGS: MORE THAN HALF THE DAYS
6. BECOMING EASILY ANNOYED OR IRRITABLE: NEARLY EVERY DAY
GAD7 TOTAL SCORE: 17

## 2022-11-10 ASSESSMENT — PATIENT HEALTH QUESTIONNAIRE - PHQ9
SUM OF ALL RESPONSES TO PHQ QUESTIONS 1-9: 16
SUM OF ALL RESPONSES TO PHQ QUESTIONS 1-9: 16
10. IF YOU CHECKED OFF ANY PROBLEMS, HOW DIFFICULT HAVE THESE PROBLEMS MADE IT FOR YOU TO DO YOUR WORK, TAKE CARE OF THINGS AT HOME, OR GET ALONG WITH OTHER PEOPLE: VERY DIFFICULT

## 2022-11-10 NOTE — PROGRESS NOTES
[facility]                                     Progress Note    Patient Name: Randi Cleary  Date: 11/10/22         Service Type: Individual     Session Start Time: 8:36 AM  Session End Time: 9:32 AM     Session Length: 56 minutes    Session #: 139    Attendees: Client attended alone    Service Modality:  Video Visit:      Provider verified identity through the following two step process.  Patient provided:  Patient is known previously to provider    Telemedicine Visit: The patient's condition can be safely assessed and treated via synchronous audio and visual telemedicine encounter.      Reason for Telemedicine Visit: Patient convenience (e.g. access to timely appointments / distance to available provider)    Originating Site (Patient Location): Patient's home    Distant Site (Provider Location): Provider Remote Setting- Home Office    Consent:  The patient/guardian has verbally consented to: the potential risks and benefits of telemedicine (video visit) versus in person care; bill my insurance or make self-payment for services provided; and responsibility for payment of non-covered services.     Patient would like the video invitation sent by:  My Chart    Mode of Communication:  Video Conference via [facility]    Distant Location (Provider):  On-site    As the provider I attest to compliance with applicable laws and regulations related to telemedicine.    DATA  Extended Session (53+ minutes): PROLONGED SERVICE IN THE OUTPATIENT SETTING REQUIRING DIRECT (FACE-TO-FACE) PATIENT CONTACT BEYOND THE USUAL SERVICE:    - Patient's presenting concerns require more intensive intervention than could be completed within the usual service  Interactive Complexity: No  Crisis: No        Progress Since Last Session (Related to Symptoms / Goals / Homework):   Symptoms: Patient has been overwhelmed a lot frequently    Homework: Progress made   Get to the pool  Journal daily  Talk to next door  neighbor  Follow through on day treatment referral     Episode of Care Goals: Satisfactory progress - ACTION (Actively working towards change); Intervened by reinforcing change plan / affirming steps taken     Current / Ongoing Stressors and Concerns:   Patient is currently socially isolated. She has a conflictual relationship with her .  She is getting minimal physical activity.  She had recent eye surgery and shoulder surgery.     Treatment Objective(s) Addressed in This Session:   Patient will increase frequency of engaging her in ADLs.  Patient will track and record at least 5 pleasant exchanges with . Patient will be able to identify at least 5 positive traits about her .  Patient will reduce level of depressive and anxious features as evidenced by reduction in score on her CHAVO-7 and PHQ-9 (scores of 15 and 16 at first measurment, respectively).     Intervention:   Supportive Therapy: Patient has had some frustrations with medications and with bills.  Processed what it was like to manage these and how others reactions to this has been impacting her.  Talked about the outpatient program that was recommended to her that thus far she has not started, reporting having received 1 phone call but no further follow-up and not much information on the program.  Talked about neck steps in getting her the support that she needs.    The following assessments were completed by patient for this visit:  PHQ9: of note, patient reported she was in a hurry and didn't read all questions, she reports no suicidal ideation  PHQ-9 SCORE 8/9/2022 8/16/2022 9/13/2022 9/20/2022 10/11/2022 10/13/2022 11/10/2022   PHQ-9 Total Score MyChart 16 (Moderately severe depression) 15 (Moderately severe depression) 7 (Mild depression) 13 (Moderate depression) 13 (Moderate depression) 17 (Moderately severe depression) 16 (Moderately severe depression)   PHQ-9 Total Score 16 15 7 13 13 17 16   Some encounter information is  confidential and restricted. Go to Review Flowsheets activity to see all data.     GAD7:   CHAVO-7 SCORE 9/2/2022 9/20/2022 10/11/2022 11/10/2022 11/10/2022 11/10/2022 11/10/2022   Total Score 13 (moderate anxiety) 13 (moderate anxiety) 15 (severe anxiety) - - - 17 (severe anxiety)   Total Score 13 13 15 17 17 17 17   Some encounter information is confidential and restricted. Go to Review Flowsheets activity to see all data.     PROMIS 10-Global Health (only subscores and total score):   PROMIS-10 Scores Only 3/15/2022 3/24/2022 4/1/2022 6/9/2022 9/6/2022 9/6/2022 9/6/2022   Global Mental Health Score 6 8 12 12 7 7 7   Global Physical Health Score 9 8 11 10 9 9 9   PROMIS TOTAL - SUBSCORES 15 16 23 22 16 16 16        ASSESSMENT: Current Emotional / Mental Status (status of significant symptoms):   Risk status (Self / Other harm or suicidal ideation)   Patient denies current fears or concerns for personal safety.   Patient denies current or recent suicidal ideation or behaviors.   Patient denies current or recent homicidal ideation or behaviors.   Patient denies current or recent self injurious behavior or ideation.   Patient denies other safety concerns.   Patient reports there has been no change in risk factors since their last session.     Patient reports there has been no change in protective factors since their last session.     A safety and risk management plan has been developed including: Patient consented to co-developed safety plan on 11/24/2020.  Safety and risk management plan was reviewed.   Patient agreed to use safety plan should any safety concerns arise.  A copy was made available to the patient.     Appearance:   Appropriate    Eye Contact:   Good    Psychomotor Behavior: Normal    Attitude:   Cooperative    Orientation:   All   Speech    Rate / Production: Normal/ Responsive    Volume:  Normal    Mood:    Normal   Affect:    Appropriate    Thought Content:  Clear  Rumination    Thought  Form:  Coherent  Obsessive    Insight:    Good      Medication Review:   No changes to current psychiatric medication(s)     Medication Compliance:   Yes     Changes in Health Issues:   None reported     Chemical Use Review:   Substance Use: Chemical use reviewed, no active concerns identified Nothing used since 2021.     Tobacco Use: No current tobacco use.      Diagnosis:  1. Bipolar 2 disorder (H)    2. Generalized anxiety disorder    3. Trauma and stressor-related disorder      Collateral Reports Completed:   Not Applicable    PLAN: (Patient Tasks / Therapist Tasks / Other)  Get to the pool  Journal daily  Talk to next door neighbor  Follow through on day treatment referral      There has been demonstrated improvement in functioning while patient has been engaged in psychotherapy/psychological service- if withdrawn the patient would deteriorate and/or relapse.     MICAELA SLADE   November 10, 2022                                                        ______________________________________________________________________    Individual Treatment Plan    Patient's Name: Randi Cleary  YOB: 1955    Date of Creation: 20  Date Treatment Plan Last Reviewed/Revised: 22    DSM5 Diagnoses: 296.89 Bipolar II Disorder Depressed, 300.02 (F41.1) Generalized Anxiety Disorder or Adjustment Disorders  309.89 (F43.8) Other Specified Trauma and Stressor Related Disorder  Psychosocial / Contextual Factors: Patient's entire family of origin has , she now has a sister-in-law and  as support.  Relationship with  is conflictual. She is recovering from surgeries  PROMIS (reviewed every 90 days): PROMIS-10 Scores  PROMIS 10-Global Health (only subscores and total score):   PROMIS-10 Scores Only 2021 3/15/2022 3/15/2022 3/15/2022 3/24/2022 2022 2022   Global Mental Health Score 6 6 6 6 8 12 12   Global Physical Health Score 9 9 9 9 8 11 10   PROMIS TOTAL - SUBSCORES  "15 15 15 15 16 23 22       Referral / Collaboration:  Referral to another professional/service is not indicated at this time..    Anticipated number of session for this episode of care: 50  Anticipation frequency of session: Biweekly  Anticipated Duration of each session: 53 or more minutes due to intensity of trauma symptoms  Treatment plan will be reviewed in 90 days or when goals have been changed.   There has been demonstrated improvement in functioning while patient has been engaged in psychotherapy/psychological service- if withdrawn the patient would deteriorate and/or relapse.       MeasurableTreatment Goal(s) related to diagnosis / functional impairment(s)  Goal 1: Patient will \"jumpstart, getting going with the things I need to be doing around the house as far as picking up, doing things, trying to do something every day.  Also to lessen the animosity between me and my .\"    I will know I've met my goal when my shoulders are fixed and I can see.      Objective #A (Patient Action)    Patient will increase frequency of engaging her in ADLs.  Status: Continued - Date(s): 12/10/21, 3/9/22, 6/9/22, 9/2/22    Intervention(s)  Therapist will engage patient in CBT, specifically behavioral activation.    Objective #B  Patient will  track and record at least 5 pleasant exchanges with . Patient will be able to identify at least 5 positive traits about her  and how he relates to her.  Status: Continued - Date(s): 12/10/21, 3/9/22, 6/9/22, 9/2/22    Intervention(s)  Therapist will teach assertiveness skills and assign homework related to relationship interactions.    Objective #C  Patient will reduce level of depressive and anxious features as evidenced by reduction in score on her CHAVO-7 and PHQ-9 (scores of 15 and 16 at first measurment, respectively).  Status: Continued - Date(s): 12/10/21, 3/9/22, 6/9/22, 9/2/22    Intervention(s)  Therapist will engage patient in person-centered therapy and " "CBT.    Patient has reviewed and agreed to the above plan.      MICAELA SLADE  September 2, 2022                                                   Randi Cleary          SAFETY PLAN:  Step 1: Warning signs / cues (Thoughts, images, mood, situation, behavior) that a crisis may be developing:  ? Thoughts: \"I don't want to continue\" \"I am unwanted\"  ? Images: none  ? Thinking Processes: ruminating  ? Mood: anger  ? Behaviors: isolating/withdrawing , can't stop crying, not taking care of myself and not taking care of my responsibilities  ? Situations: small triggers, such as not being able to find something, or dropping something   Step 2: Coping strategies - Things I can do to take my mind off of my problems without contacting another person (relaxation technique, physical activity):  ? Distress Tolerance Strategies:  arts and crafts: drawing, play with my pet , listen to positive and upbeat music: any, change body temperature (ice pack/cold water)  and paced breathing/progressive muscle relaxation  ? Physical Activities: go for a walk, deep breathing and stretching   ? Focus on helpful thoughts:  \"You've been through this before, you can get through it again.\"  Step 3: People and social settings that provide distraction:                 Name: Carmen                            Name: Darien                           Name: Aleida       ? pool, shopping, Carmen's house, [employer]       Step 4: Remind myself of people and things that are important to me and worth living for:  Clifford Little Donna, post-COVID world, options of what could be in your future        Step 5: When I am in crisis, I can ask these people to help me use my safety plan:                 Name: Sidney  Step 6: Making the environment safe:   ? go to sleep/daydream  Step 7: Professionals or agencies I can contact during a crisis:  ? [facility] Daytime Number: [phone]  ? Suicide Prevention Lifeline: 3-244-355-CCQJ (3077)  ? Crisis " Text Line Service (available 24 hours a day, 7 days a week): Text [place] to 218107    Local Crisis Services: [facility] Crisis: [phone]     Call 911 or go to my nearest emergency department.       I helped develop this safety plan and agree to use it when needed.  I have been given a copy of this plan.       Client signature _________________________________________________________________  Today s date:  11/24/2020  Adapted from Safety Plan Template 2008 Randi Poole and Robby Barba is reprinted with the express permission of the authors.  No portion of the Safety Plan Template may be reproduced without the express, written permission.  You can contact the authors at [email or web address] or [email or web address].

## 2022-11-11 DIAGNOSIS — M54.2 CERVICAL SPINE PAIN: ICD-10-CM

## 2022-11-11 DIAGNOSIS — R32 URINARY INCONTINENCE, UNSPECIFIED TYPE: Primary | ICD-10-CM

## 2022-11-15 NOTE — TELEPHONE ENCOUNTER
[facility]: Hematology                                                                                          Spoke with Winnie today regarding her questions about labs.  She wanted to compare previous values with current and had questions on being fatigued.  She reports being itchy lately.    She will proceed with phlebotomy next week.  Ok to use labs from 11/4/22  No need to redraw.  She is concerned about her last experience. She stated she had to sit in a regular hard chair, not a bed or recliner.  She is a very hard stick and does want the Ultrasound start.   She also reports her blood comes out foamy and they were not able to get the full 250 mL     She plans to extra hydrate that day before and morning of the phlebotomy       Crissy Musa LPN  Hematology Care Coordination  [phone]

## 2022-11-16 ENCOUNTER — OFFICE VISIT (OUTPATIENT)
Dept: PALLIATIVE MEDICINE | Facility: OTHER | Age: 67
End: 2022-11-16
Payer: MEDICARE

## 2022-11-16 VITALS
WEIGHT: 243 LBS | HEART RATE: 99 BPM | BODY MASS INDEX: 39.22 KG/M2 | SYSTOLIC BLOOD PRESSURE: 140 MMHG | DIASTOLIC BLOOD PRESSURE: 88 MMHG | OXYGEN SATURATION: 94 %

## 2022-11-16 DIAGNOSIS — G95.20 CORD COMPRESSION (H): ICD-10-CM

## 2022-11-16 DIAGNOSIS — Z79.891 LONG TERM (CURRENT) USE OF OPIATE ANALGESIC: Primary | ICD-10-CM

## 2022-11-16 LAB
CANNABINOIDS UR QL SCN: ABNORMAL
CREAT UR-MCNC: 62 MG/DL

## 2022-11-16 PROCEDURE — G0463 HOSPITAL OUTPT CLINIC VISIT: HCPCS

## 2022-11-16 PROCEDURE — 99214 OFFICE O/P EST MOD 30 MIN: CPT | Performed by: ANESTHESIOLOGY

## 2022-11-16 PROCEDURE — 80307 DRUG TEST PRSMV CHEM ANLYZR: CPT | Performed by: ANESTHESIOLOGY

## 2022-11-16 RX ORDER — HYDROCODONE BITARTRATE AND ACETAMINOPHEN 5; 325 MG/1; MG/1
1 TABLET ORAL 3 TIMES DAILY PRN
Qty: 70 TABLET | Refills: 0 | Status: SHIPPED | OUTPATIENT
Start: 2022-11-16 | End: 2022-12-21

## 2022-11-16 ASSESSMENT — PAIN SCALES - PAIN ENJOYMENT GENERAL ACTIVITY SCALE (PEG)
AVG_PAIN_PASTWEEK: 6
INTERFERED_ENJOYMENT_LIFE: 7
INTERFERED_GENERAL_ACTIVITY: 6
PEG_TOTALSCORE: 6.33

## 2022-11-16 ASSESSMENT — PAIN SCALES - GENERAL: PAINLEVEL: SEVERE PAIN (7)

## 2022-11-16 NOTE — PATIENT INSTRUCTIONS
"PLAN:    You will be renewed for the [place] medical cannabis program.    Order for occipital nerve block placed.    You will contact [facility] to review your ganglion cyst.    Discussed decreasing the diet soda with aspartame that may be neurotoxic.    Discussed the use of grounding mats or \"earthing mats\" resources given.    Continue the lithium orotate 10 mg at bedtime and oxcarbazepine 1 to 50 mg at bedtime.    Continue the hydrocodone 5 mg 3 times a day.    You will return to the pool as able    Follow-up with Dr. Dubois in 2 to 3 months  "

## 2022-11-16 NOTE — PROGRESS NOTES
Patient presents to the clinic today for a follow up with AXEL WIGGINS MD regarding Pain Management.        UDS/CSA-  12.17.2021    Medications-Norco (hydrocodone-acetaminaphen) 11.16.2022 @1130am    QUESTIONS:    Lorena GOODWIN [facility] Visit Facilitator

## 2022-11-16 NOTE — PROGRESS NOTES
"[facility] - Office Visit    ASSESSMENT & PLAN     Randi was seen today for pain.    Diagnoses and all orders for this visit:    Long term (current) use of opiate analgesic  -     Drug Confirmation Panel Urine with Creat; Future  -     Ethyl Glucuronide Screen with Reflex to Confirmation, Urine; Future  -     Drug Confirmation Panel Urine with Creat  -     Ethyl Glucuronide Screen with Reflex to Confirmation, Urine    Cord compression (H)  -     HYDROcodone-acetaminophen (NORCO) 5-325 MG tablet; Take 1 tablet by mouth 3 times daily as needed for breakthrough pain or moderate to severe pain Dispense/start: may fill 11/26 for 11/28  -     PAIN INJECTION EVAL/TREAT/FOLLOW UP; Future        Patient Instructions   PLAN:    You will be renewed for the [place] medical cannabis program.    Order for occipital nerve block placed.    You will contact [facility] to review your ganglion cyst.    Discussed decreasing the diet soda with aspartame that may be neurotoxic.    Discussed the use of grounding mats or \"earthing mats\" resources given.    Continue the lithium orotate 10 mg at bedtime and oxcarbazepine 1 to 50 mg at bedtime.    Continue the hydrocodone 5 mg 3 times a day.    You will return to the pool as able    Follow-up with Dr. Wiggins in 2 to 3 months        -----  AXEL WIGGINS MD  [facility]       SUBJECTIVE      Randi Cleary is a 67 year old year old female who presents to clinic today for the following:     Follow for history of cervical surgery, mood disorder.    She reviews today has been using the medical cannabis the flower finding the higher THC element helps with anxiety sometimes anger.  Note she get emotional.    Some days she feels like she may not \"make it\", referencing passive thoughts of suicide.  She does better when she is by herself more stress when interacting with her .  We reviewed many years ago had an overdose of Prozac and " called for rescue go to the emergency room being hospitalized.  Cannot be clear what he deterrent is presently.    Continues actively working with her psychotherapist.    Does still use the lithium orotate 10 mg at bedtime and oxcarbazepine 1 and 50 mg at night.    Reviews her primary care provider is leaving and she is worked with her a long time.    She continues working with her hematologist, iron levels are being addressed and having phlebotomy.    She has not yet seen [facility], indicates her right ganglion cyst on her wrist which has been frustrating.    When last seen had been issued an occipital nerve block for headaches.  She thinks the order .  She reviews again pain seems to come from the bottom of her neck over the top, underneath her ears over the top of her head to behind her eyes.  She is looking for a massage therapist that may help.    She continues with the hydrocodone 5 mg 3 times a day family there is some benefit.  Last urine drug test in December so will be obtained, BMP reviewed    Review of Systems   General, psych, musculoskeletal, bowels and bladder otherwise normal other than above.          OBJECTIVE   BP (!) 140/88   Pulse 99   Wt 110.2 kg (243 lb)   SpO2 94%   BMI 39.22 kg/m          Physical Exam  General: Alert, clear sensorium, ambulates with cane.  Has a diet Coke can.  Reviewed concerns with aspartame being neurotoxic.  Reports drinks about 3 a day, her  much more.    Cardiovascular: Normal rate  Lungs: Pulmonary effort is normal, speaking in full sentences  MSK: Cervical surgical scar noted.  Tender to palpation in trapezius, paraspinal, occipital area  Skin: Warm and dry. No concerning rashes or lesions.  Neurologic: No focal deficit, alert and oriented x3  Psychiatric: Tearful at times      Assessment: History of a mood disorder, Polar spectrum.  Has not tolerated many antidepressants with concern for serotonin syndrome in the past.  Actively working  with her psychotherapist.  Likely social stressors noted.    History of cervical surgery with cervico genic headaches.    Plan as above.    Total time more than 29 minutes with moderate complexity decision making

## 2022-11-16 NOTE — PROGRESS NOTES
Neurosurgery Follow UP  November 16, 2022    A/P: Winnie Cleary is S/P left open door laminoplasty C3-6, foraminotomies left C4-7 for cervical myelopathy, radiculopathy, stenosis with Dr Gregory 12/21/2021.    Winnie is here today with multiple concerns. Reports posterior Neck pain which is constant. More so at the end of the day. If she over does it (doesn't take much) pain starts in the back of her head and goes over her eyes. Not every day but very frequent. When she gets upset it brings on this pain. Gets bilateral arm pain. Left arm major pain is gone since surgery. Is in need of rotator cuff surgery bilaterally. No significant numbness or tingling in arms. She also has complaints of right wrist pain which she has had carpal tunnel on in the past as well as ganglion cyst both of which she has been seen at [facility] for and plans to return to.     Still sees Dr Dubois for pain control who recommends occipital nerve block and wants our opinion which I think would be helpful.     Winnie would like to return to PT which I think would be helpful for her overall aches and pains. She had great response last time she was at [facility]. Referral given. CT of neck and I will call her with results. Return as needed.     PLAN:   PT - [facility]   CT neck - I will call you after its complete and discuss results     Agree with occipital nerve block Dr Dubois plans     HPI: Randi Cleary is a 66 year old female who presents with imbalance, numbness and tingling in her hands, fine motor difficulties, left shoulder pain, radiating left arm pain and numbness. She complains of both myelopathy and radiculopathy symptoms. MRI of her cervical spine sows multi-level cervical stenosis including moderate at C4-5, and moderate to severe at C6-7 and mild spinal canal stenosis at C5-6 and C3-4 with multi-level severe foraminal narrowing at these levels. Also has retrolisthesis of C4-5 and anterolisthesis of C7-T1. XR does not  "show any significant instability. [facility] [place] 2021. Reports Hx: taking oxycodone for 20 years, medical cannabis. Sober since august 2021. Lithium orotate and oxarbazepine in the past. Bipolar spectrum disorder. [facility]    Physical Exam  /83   Pulse 89   Ht 5' 6\" (1.676 m)   Wt 243 lb (110.2 kg)   SpO2 93%   BMI 39.22 kg/m      General: alert, oriented. SANTOS. Speech is clear.     Motor: normal bulk and tone     Strength:   Equal strength in UE bilaterally     Sensation: intact to touch     Reflexes: no hyperreflexia    Imaging: no new imaging     AZUCENA Max  [facility]  O: [phone]        "

## 2022-11-17 ENCOUNTER — VIRTUAL VISIT (OUTPATIENT)
Dept: PSYCHOLOGY | Facility: CLINIC | Age: 67
End: 2022-11-17
Payer: MEDICARE

## 2022-11-17 ENCOUNTER — OFFICE VISIT (OUTPATIENT)
Dept: NEUROSURGERY | Facility: CLINIC | Age: 67
End: 2022-11-17
Payer: MEDICARE

## 2022-11-17 VITALS
WEIGHT: 243 LBS | DIASTOLIC BLOOD PRESSURE: 83 MMHG | HEART RATE: 89 BPM | HEIGHT: 66 IN | OXYGEN SATURATION: 93 % | SYSTOLIC BLOOD PRESSURE: 132 MMHG | BODY MASS INDEX: 39.05 KG/M2

## 2022-11-17 DIAGNOSIS — F43.9 TRAUMA AND STRESSOR-RELATED DISORDER: ICD-10-CM

## 2022-11-17 DIAGNOSIS — F31.81 BIPOLAR 2 DISORDER (H): Primary | ICD-10-CM

## 2022-11-17 DIAGNOSIS — F41.1 GENERALIZED ANXIETY DISORDER: ICD-10-CM

## 2022-11-17 DIAGNOSIS — M54.12 CERVICAL RADICULOPATHY: Primary | ICD-10-CM

## 2022-11-17 DIAGNOSIS — M54.2 NECK PAIN: ICD-10-CM

## 2022-11-17 PROCEDURE — 99213 OFFICE O/P EST LOW 20 MIN: CPT | Performed by: NURSE PRACTITIONER

## 2022-11-17 PROCEDURE — 90837 PSYTX W PT 60 MINUTES: CPT | Mod: 95 | Performed by: SOCIAL WORKER

## 2022-11-17 NOTE — PATIENT INSTRUCTIONS
PT - [facility]   CT neck - I will call you after its complete and discuss results     Agree with occipital nerve block Dr Dubois plans

## 2022-11-17 NOTE — LETTER
11/17/2022         RE: Randi Cleary  [address] [facility] [address]        Dear Colleague,    Thank you for referring your patient, Randi Cleary, to the [facility]. Please see a copy of my visit note below.    Neurosurgery Follow UP  November 16, 2022    A/P: Winnie Cleary is S/P left open door laminoplasty C3-6, foraminotomies left C4-7 for cervical myelopathy, radiculopathy, stenosis with Dr Gregory 12/21/2021.    Winnie is here today with multiple concerns. Reports posterior Neck pain which is constant. More so at the end of the day. If she over does it (doesn't take much) pain starts in the back of her head and goes over her eyes. Not every day but very frequent. When she gets upset it brings on this pain. Gets bilateral arm pain. Left arm major pain is gone since surgery. Is in need of rotator cuff surgery bilaterally. No significant numbness or tingling in arms. She also has complaints of right wrist pain which she has had carpal tunnel on in the past as well as ganglion cyst both of which she has been seen at [place] for and plans to return to.     Still sees Dr Dubois for pain control who recommends occipital nerve block and wants our opinion which I think would be helpful.     Winnie would like to return to PT which I think would be helpful for her overall aches and pains. She had great response last time she was at [facility]. Referral given. CT of neck and I will call her with results. Return as needed.     PLAN:   PT - [facility]   CT neck - I will call you after its complete and discuss results     Agree with occipital nerve block Dr Dubois plans     HPI: Randi Cleary is a 66 year old female who presents with imbalance, numbness and tingling in her hands, fine motor difficulties, left shoulder pain, radiating left arm pain and numbness. She complains of both myelopathy and radiculopathy symptoms. MRI of her cervical spine sows  "multi-level cervical stenosis including moderate at C4-5, and moderate to severe at C6-7 and mild spinal canal stenosis at C5-6 and C3-4 with multi-level severe foraminal narrowing at these levels. Also has retrolisthesis of C4-5 and anterolisthesis of C7-T1. XR does not show any significant instability. [facility] 2021. Reports Hx: taking oxycodone for 20 years, medical cannabis. Sober since august 2021. Lithium orotate and oxarbazepine in the past. Bipolar spectrum disorder. [facility]    Physical Exam  /83   Pulse 89   Ht 5' 6\" (1.676 m)   Wt 243 lb (110.2 kg)   SpO2 93%   BMI 39.22 kg/m      General: alert, oriented. SANTOS. Speech is clear.     Motor: normal bulk and tone     Strength:   Equal strength in UE bilaterally     Sensation: intact to touch     Reflexes: no hyperreflexia    Imaging: no new imaging     AZUCENA Max  [facility]  O: [phone]            Again, thank you for allowing me to participate in the care of your patient.        Sincerely,        DION Thompson CNP    "

## 2022-11-17 NOTE — PROGRESS NOTES
[facility]                                     Progress Note    Patient Name: Randi Cleary  Date: 11/17/22         Service Type: Individual     Session Start Time: 8:34 AM  Session End Time: 9:29 AM     Session Length: 55 minutes    Session #: 140    Attendees: Client attended alone    Service Modality:  Video Visit:      Provider verified identity through the following two step process.  Patient provided:  Patient is known previously to provider    Telemedicine Visit: The patient's condition can be safely assessed and treated via synchronous audio and visual telemedicine encounter.      Reason for Telemedicine Visit: Patient convenience (e.g. access to timely appointments / distance to available provider)    Originating Site (Patient Location): Patient's home    Distant Site (Provider Location): Provider Remote Setting- Home Office    Consent:  The patient/guardian has verbally consented to: the potential risks and benefits of telemedicine (video visit) versus in person care; bill my insurance or make self-payment for services provided; and responsibility for payment of non-covered services.     Patient would like the video invitation sent by:  My Chart    Mode of Communication:  Video Conference via [facility]    Distant Location (Provider):  Off-site    As the provider I attest to compliance with applicable laws and regulations related to telemedicine.    DATA  Extended Session (53+ minutes): PROLONGED SERVICE IN THE OUTPATIENT SETTING REQUIRING DIRECT (FACE-TO-FACE) PATIENT CONTACT BEYOND THE USUAL SERVICE:    - Patient's presenting concerns require more intensive intervention than could be completed within the usual service  Interactive Complexity: No  Crisis: No        Progress Since Last Session (Related to Symptoms / Goals / Homework):   Symptoms: Patient is continuing to have a lot of overwhelming and labile moods    Homework: Progress made   Get to the pool  Journal daily  Talk to  next door neighbor  Follow through on day treatment referral     Episode of Care Goals: Satisfactory progress - ACTION (Actively working towards change); Intervened by reinforcing change plan / affirming steps taken     Current / Ongoing Stressors and Concerns:   Patient is currently socially isolated. She has a conflictual relationship with her .  She is getting minimal physical activity.  She had recent eye surgery and shoulder surgery.     Treatment Objective(s) Addressed in This Session:   Patient will increase frequency of engaging her in ADLs.  Patient will track and record at least 5 pleasant exchanges with . Patient will be able to identify at least 5 positive traits about her .  Patient will reduce level of depressive and anxious features as evidenced by reduction in score on her CHAVO-7 and PHQ-9 (scores of 15 and 16 at first measurment, respectively).     Intervention:   Supportive Therapy: Reviewed homework and identified successes and barriers to completion.  Discussed patient's soda consumption, she is reporting consuming about 6 sodas a day.  Talked about how this could be impacting her mental health and her potential plans to reduce this, discussing gradual decrease to avoid headaches.  Discussed social challenges, including challenges with her good friend and how she is managing these.    The following assessments were completed by patient for this visit:  PHQ9: of note, patient reported she was in a hurry and didn't read all questions, she reports no suicidal ideation  PHQ-9 SCORE 8/16/2022 9/13/2022 9/20/2022 10/11/2022 10/13/2022 11/10/2022 11/17/2022   PHQ-9 Total Score MyChart 15 (Moderately severe depression) 7 (Mild depression) 13 (Moderate depression) 13 (Moderate depression) 17 (Moderately severe depression) 16 (Moderately severe depression) 15 (Moderately severe depression)   PHQ-9 Total Score 15 7 13 13 17 16 15   Some encounter information is confidential and  restricted. Go to Review Flowsheets activity to see all data.     GAD7:   CHAVO-7 SCORE 9/2/2022 9/20/2022 10/11/2022 11/10/2022 11/10/2022 11/10/2022 11/10/2022   Total Score 13 (moderate anxiety) 13 (moderate anxiety) 15 (severe anxiety) - - - 17 (severe anxiety)   Total Score 13 13 15 17 17 17 17   Some encounter information is confidential and restricted. Go to Review Flowsheets activity to see all data.     PROMIS 10-Global Health (only subscores and total score):   PROMIS-10 Scores Only 3/15/2022 3/24/2022 4/1/2022 6/9/2022 9/6/2022 9/6/2022 9/6/2022   Global Mental Health Score 6 8 12 12 7 7 7   Global Physical Health Score 9 8 11 10 9 9 9   PROMIS TOTAL - SUBSCORES 15 16 23 22 16 16 16        ASSESSMENT: Current Emotional / Mental Status (status of significant symptoms):   Risk status (Self / Other harm or suicidal ideation)   Patient denies current fears or concerns for personal safety.   Patient denies current or recent suicidal ideation or behaviors.   Patient denies current or recent homicidal ideation or behaviors.   Patient denies current or recent self injurious behavior or ideation.   Patient denies other safety concerns.   Patient reports there has been no change in risk factors since their last session.     Patient reports there has been no change in protective factors since their last session.     A safety and risk management plan has been developed including: Patient consented to co-developed safety plan on 11/24/2020.  Safety and risk management plan was reviewed.   Patient agreed to use safety plan should any safety concerns arise.  A copy was made available to the patient.     Appearance:   Appropriate    Eye Contact:   Good    Psychomotor Behavior: Normal    Attitude:   Cooperative    Orientation:   All   Speech    Rate / Production: Normal/ Responsive    Volume:  Normal    Mood:    Normal   Affect:    Appropriate    Thought Content:  Clear  Rumination    Thought Form:  Coherent  Obsessive     Insight:    Good      Medication Review:   No changes to current psychiatric medication(s)     Medication Compliance:   Yes     Changes in Health Issues:   None reported     Chemical Use Review:   Substance Use: Chemical use reviewed, no active concerns identified Nothing used since 2021.     Tobacco Use: No current tobacco use.      Diagnosis:  1. Bipolar 2 disorder (H)    2. Generalized anxiety disorder    3. Trauma and stressor-related disorder      Collateral Reports Completed:   Not Applicable    PLAN: (Patient Tasks / Therapist Tasks / Other)  Get to the pool  Journal daily  Talk to next door neighbor  Follow through on day treatment referral      There has been demonstrated improvement in functioning while patient has been engaged in psychotherapy/psychological service- if withdrawn the patient would deteriorate and/or relapse.     MICAELA SLADE   2022                                                        ______________________________________________________________________    Individual Treatment Plan    Patient's Name: Randi Cleary  YOB: 1955    Date of Creation: 20  Date Treatment Plan Last Reviewed/Revised: 22    DSM5 Diagnoses: 296.89 Bipolar II Disorder Depressed, 300.02 (F41.1) Generalized Anxiety Disorder or Adjustment Disorders  309.89 (F43.8) Other Specified Trauma and Stressor Related Disorder  Psychosocial / Contextual Factors: Patient's entire family of origin has , she now has a sister-in-law and  as support.  Relationship with  is conflictual. She is recovering from surgeries  PROMIS (reviewed every 90 days): PROMIS-10 Scores  PROMIS 10-Global Health (only subscores and total score):   PROMIS-10 Scores Only 2021 3/15/2022 3/15/2022 3/15/2022 3/24/2022 2022 2022   Global Mental Health Score 6 6 6 6 8 12 12   Global Physical Health Score 9 9 9 9 8 11 10   PROMIS TOTAL - SUBSCORES 15 15 15 15 16 23 22  "      Referral / Collaboration:  Referral to another professional/service is not indicated at this time..    Anticipated number of session for this episode of care: 50  Anticipation frequency of session: Biweekly  Anticipated Duration of each session: 53 or more minutes due to intensity of trauma symptoms  Treatment plan will be reviewed in 90 days or when goals have been changed.   There has been demonstrated improvement in functioning while patient has been engaged in psychotherapy/psychological service- if withdrawn the patient would deteriorate and/or relapse.       MeasurableTreatment Goal(s) related to diagnosis / functional impairment(s)  Goal 1: Patient will \"jumpstart, getting going with the things I need to be doing around the house as far as picking up, doing things, trying to do something every day.  Also to lessen the animosity between me and my .\"    I will know I've met my goal when my shoulders are fixed and I can see.      Objective #A (Patient Action)    Patient will increase frequency of engaging her in ADLs.  Status: Continued - Date(s): 12/10/21, 3/9/22, 6/9/22, 9/2/22    Intervention(s)  Therapist will engage patient in CBT, specifically behavioral activation.    Objective #B  Patient will  track and record at least 5 pleasant exchanges with . Patient will be able to identify at least 5 positive traits about her  and how he relates to her.  Status: Continued - Date(s): 12/10/21, 3/9/22, 6/9/22, 9/2/22    Intervention(s)  Therapist will teach assertiveness skills and assign homework related to relationship interactions.    Objective #C  Patient will reduce level of depressive and anxious features as evidenced by reduction in score on her CHAVO-7 and PHQ-9 (scores of 15 and 16 at first measurment, respectively).  Status: Continued - Date(s): 12/10/21, 3/9/22, 6/9/22, 9/2/22    Intervention(s)  Therapist will engage patient in person-centered therapy and CBT.    Patient has " "reviewed and agreed to the above plan.      MICAELA SLADE  September 2, 2022                                                   Randi Cleary          SAFETY PLAN:  Step 1: Warning signs / cues (Thoughts, images, mood, situation, behavior) that a crisis may be developing:  ? Thoughts: \"I don't want to continue\" \"I am unwanted\"  ? Images: none  ? Thinking Processes: ruminating  ? Mood: anger  ? Behaviors: isolating/withdrawing , can't stop crying, not taking care of myself and not taking care of my responsibilities  ? Situations: small triggers, such as not being able to find something, or dropping something   Step 2: Coping strategies - Things I can do to take my mind off of my problems without contacting another person (relaxation technique, physical activity):  ? Distress Tolerance Strategies:  arts and crafts: drawing, play with my pet , listen to positive and upbeat music: any, change body temperature (ice pack/cold water)  and paced breathing/progressive muscle relaxation  ? Physical Activities: go for a walk, deep breathing and stretching   ? Focus on helpful thoughts:  \"You've been through this before, you can get through it again.\"  Step 3: People and social settings that provide distraction:                 Name: Carmen                            Name: Darien                           Name: Aleida       ? pool, shopping, Carmen's house, [employer]       Step 4: Remind myself of people and things that are important to me and worth living for:  Clifford Little Donna, post-COVID world, options of what could be in your future        Step 5: When I am in crisis, I can ask these people to help me use my safety plan:                 Name: Sidney  Step 6: Making the environment safe:   ? go to sleep/daydream  Step 7: Professionals or agencies I can contact during a crisis:  ? [facility] Daytime Number: [phone]  ? Suicide Prevention Lifeline: 8-057-457-UOJM (7212)  ? Crisis Text Line Service " (available 24 hours a day, 7 days a week): Text [place] to 193836    Local Crisis Services: [facility] Crisis: [phone]     Call 911 or go to my nearest emergency department.       I helped develop this safety plan and agree to use it when needed.  I have been given a copy of this plan.       Client signature _________________________________________________________________  Today s date:  11/24/2020  Adapted from Safety Plan Template 2008 Randi Poole and Robby Barba is reprinted with the express permission of the authors.  No portion of the Safety Plan Template may be reproduced without the express, written permission.  You can contact the authors at bhs@[email or web address] or [email or web address].

## 2022-11-18 LAB
DHC UR CFM-MCNC: 380 NG/ML
DHC/CREAT UR: 613 NG/MG {CREAT}
ETHYL GLUCURONIDE UR QL SCN: NEGATIVE NG/ML
HYDROCODONE UR CFM-MCNC: 1280 NG/ML
HYDROCODONE/CREAT UR: 2065 NG/MG {CREAT}
HYDROMORPHONE UR CFM-MCNC: 153 NG/ML
HYDROMORPHONE/CREAT UR: 247 NG/MG {CREAT}

## 2022-11-22 ENCOUNTER — INFUSION THERAPY VISIT (OUTPATIENT)
Dept: ONCOLOGY | Facility: CLINIC | Age: 67
End: 2022-11-22
Payer: MEDICARE

## 2022-11-22 ENCOUNTER — MEDICAL CORRESPONDENCE (OUTPATIENT)
Dept: NEUROSURGERY | Facility: CLINIC | Age: 67
End: 2022-11-22

## 2022-11-22 VITALS
TEMPERATURE: 98.8 F | DIASTOLIC BLOOD PRESSURE: 68 MMHG | SYSTOLIC BLOOD PRESSURE: 132 MMHG | RESPIRATION RATE: 16 BRPM | HEART RATE: 98 BPM | OXYGEN SATURATION: 93 %

## 2022-11-22 DIAGNOSIS — E83.119 HEMOCHROMATOSIS, UNSPECIFIED HEMOCHROMATOSIS TYPE: Primary | ICD-10-CM

## 2022-11-22 PROCEDURE — 999N000248 HC STATISTIC IV INSERT WITH US BY RN

## 2022-11-22 PROCEDURE — 99195 PHLEBOTOMY: CPT

## 2022-11-22 ASSESSMENT — PAIN SCALES - GENERAL: PAINLEVEL: NO PAIN (0)

## 2022-11-22 NOTE — PROGRESS NOTES
Infusion Nursing Note:  Randi Cleary presents today for therapeutic phlebotomy.    Patient seen by provider today: No    Note: Patient presents to the infusion center today denying any new symptoms or concerns.    Per EV Connect message from Crissy Musa LPN ok to use labs from 11/4/222, no need to redraw today.     250ml of blood was removed. VSS post. Patient denies any dizziness or lightheadedness. Patient denied the need for any IVFs post phelb.     Intravenous Access:  Peripheral IV placed by ultrasound.    Treatment Conditions:   Latest Reference Range & Units 11/04/22 14:29   Ferritin 11 - 328 ng/mL 85      Latest Reference Range & Units 11/04/22 14:29   Hemoglobin 11.7 - 15.7 g/dL 17.3 (H)     Results reviewed, labs MET treatment parameters, ok to proceed with treatment.    Post Infusion Assessment:  Patient tolerated phlebotomy without incident.     Discharge Plan:   Patient declined prescription refills.  Discharge instructions reviewed with: Patient and frien.  Patient and/or family verbalized understanding of discharge instructions and all questions answered.  AVS to patient via [employer].  Patient will return 1/13 for next appointment.   Patient discharged in stable condition accompanied by: friend.  Departure Mode: Ambulatory with cane.      Jesi Connolly RN

## 2022-11-22 NOTE — PATIENT INSTRUCTIONS
[facility] Triage and after hours / weekends / holidays:  [phone]    Please call the triage or after hours line if you experience a temperature greater than or equal to 100.4, shaking chills, have uncontrolled nausea, vomiting and/or diarrhea, dizziness, shortness of breath, chest pain, bleeding, unexplained bruising, or if you have any other new/concerning symptoms, questions or concerns.      If you are having any concerning symptoms or wish to speak to a provider before your next infusion visit, please call your care coordinator or triage to notify them so we can adequately serve you.     If you need a refill on a narcotic prescription or other medication, please call before your infusion appointment.

## 2022-11-23 ENCOUNTER — VIRTUAL VISIT (OUTPATIENT)
Dept: PSYCHOLOGY | Facility: CLINIC | Age: 67
End: 2022-11-23
Payer: MEDICARE

## 2022-11-23 ENCOUNTER — TELEPHONE (OUTPATIENT)
Dept: PALLIATIVE MEDICINE | Facility: OTHER | Age: 67
End: 2022-11-23

## 2022-11-23 DIAGNOSIS — F43.9 TRAUMA AND STRESSOR-RELATED DISORDER: ICD-10-CM

## 2022-11-23 DIAGNOSIS — G95.20 CORD COMPRESSION (H): ICD-10-CM

## 2022-11-23 DIAGNOSIS — F41.1 GENERALIZED ANXIETY DISORDER: ICD-10-CM

## 2022-11-23 DIAGNOSIS — F31.81 BIPOLAR 2 DISORDER (H): Primary | ICD-10-CM

## 2022-11-23 PROCEDURE — 90834 PSYTX W PT 45 MINUTES: CPT | Mod: 95 | Performed by: SOCIAL WORKER

## 2022-11-23 NOTE — PROGRESS NOTES
[facility]                                     Progress Note    Patient Name: Randi Cleary  Date: 4/12/22         Service Type: Individual     Session Start Time: 10:33 AM  Session End Time: 11:23 AM     Session Length: 50 minutes    Session #: 101    Attendees: Client attended alone    Service Modality:  Video Visit:      Provider verified identity through the following two step process.  Patient provided:  Patient is known previously to provider    Telemedicine Visit: The patient's condition can be safely assessed and treated via synchronous audio and visual telemedicine encounter.      Reason for Telemedicine Visit: Services only offered telehealth    Originating Site (Patient Location): Patient's home    Distant Site (Provider Location): Provider Remote Setting- Home Office    Consent:  The patient/guardian has verbally consented to: the potential risks and benefits of telemedicine (video visit) versus in person care; bill my insurance or make self-payment for services provided; and responsibility for payment of non-covered services.     Patient would like the video invitation sent by:  My Chart    Mode of Communication:  Video Conference via [facility]    As the provider I attest to compliance with applicable laws and regulations related to telemedicine.    DATA  Extended Session (53+ minutes): No  Interactive Complexity: No  Crisis: No        Progress Since Last Session (Related to Symptoms / Goals / Homework):   Symptoms: Patient has had some stability now, but is feeling overwhelmed by tasks    Homework: Progress made  Work on finances (taxes, budgeting)    Look in May for couples therapist  Continue to journal before sessions     Episode of Care Goals: Satisfactory progress - ACTION (Actively working towards change); Intervened by reinforcing change plan / affirming steps taken     Current / Ongoing Stressors and Concerns:   Patient is currently socially isolated. She has a  conflictual relationship with her .  She is getting minimal physical activity.  She had recent eye surgery and shoulder surgery.     Treatment Objective(s) Addressed in This Session:   Patient will increase frequency of engaging her in ADLs.  Patient will track and record at least 5 pleasant exchanges with . Patient will be able to identify at least 5 positive traits about her .  Patient will reduce level of depressive and anxious features as evidenced by reduction in score on her CHAVO-7 and PHQ-9 (scores of 15 and 16 at first measurment, respectively).     Intervention:   Person-Centered Therapy, Solution Focused Therapy, Supportive Therapy: Patient reported she made a decision on her home loan and moved forward.  Discussed feeling like she's pulled in so many directions and feeling overwhelmed. Talked about creating a schedule for herself to not get stuck in the tasks she finds herself doing and ignoring the rest of her needs.    The following assessments were completed by patient for this visit:  PHQ9:   PHQ-9 SCORE 2/22/2022 3/2/2022 3/15/2022 3/18/2022 3/24/2022 4/1/2022 4/12/2022   PHQ-9 Total Score MyChart 14 (Moderate depression) 13 (Moderate depression) 15 (Moderately severe depression) 11 (Moderate depression) 12 (Moderate depression) 8 (Mild depression) 17 (Moderately severe depression)   PHQ-9 Total Score 17 13 15 11 12 8 17   Some encounter information is confidential and restricted. Go to Review Flowsheets activity to see all data.     GAD2:   CHAVO-2 3/2/2022 3/2/2022 3/15/2022 3/15/2022 3/15/2022 3/24/2022 4/1/2022   Feeling nervous, anxious, or on edge 3 3 3 3 3 2 2   Not being able to stop or control worrying 3 3 2 2 2 1 1   CHAVO-2 Total Score 6 6 5 5 5 3 3     PROMIS 10-Global Health (only subscores and total score):   PROMIS-10 Scores Only 12/14/2021 12/14/2021 3/15/2022 3/15/2022 3/15/2022 3/24/2022 4/1/2022   Global Mental Health Score 6 6 6 6 6 8 12   Global Physical Health  Score 9 9 9 9 9 8 11   PROMIS TOTAL - SUBSCORES 15 15 15 15 15 16 23         ASSESSMENT: Current Emotional / Mental Status (status of significant symptoms):   Risk status (Self / Other harm or suicidal ideation)   Patient denies current fears or concerns for personal safety.   Patient denies current or recent suicidal ideation or behaviors.   Patient denies current or recent homicidal ideation or behaviors.   Patient denies current or recent self injurious behavior or ideation.   Patient denies other safety concerns.   Patient reports there has been no change in risk factors since their last session.     Patient reports there has been no change in protective factors since their last session.     A safety and risk management plan has been developed including: Patient has no change in safety concerns. Committed to safety and agreed to follow previously developed safety plan..  Patient consented to co-developed safety plan.  Safety and risk management plan was completed.  Patient agreed to use safety plan should any safety concerns arise.  A copy was given to the patient.     Appearance:   Appropriate    Eye Contact:   Good    Psychomotor Behavior: Normal    Attitude:   Cooperative    Orientation:   All   Speech    Rate / Production: Normal/ Responsive    Volume:  Normal    Mood:    Normal   Affect:    Appropriate    Thought Content:  Clear    Thought Form:  Coherent    Insight:    Good      Medication Review:   No changes to current psychiatric medication(s)     Medication Compliance:   Yes     Changes in Health Issues:   None reported     Chemical Use Review:   Substance Use: Chemical use reviewed, no active concerns identified Nothing used since August 2021.     Tobacco Use: No current tobacco use.      Diagnosis:  1. Bipolar 2 disorder (H)    2. Generalized anxiety disorder    3. Trauma and stressor-related disorder      Collateral Reports Completed:   Not Applicable    PLAN: (Patient Tasks / Therapist Tasks /  Other)  Create a schedule to follow  Get back to budgeting    Look in May for couples therapist  Continue to journal before sessions      In the future- work with this part of yourself that spends the money- not wanting to miss out, as well as how you're hurt by family and money     In the future, return to: the part that puts up walls and the part that is sensitive to rejection      There has been demonstrated improvement in functioning while patient has been engaged in psychotherapy/psychological service- if withdrawn the patient would deteriorate and/or relapse.     MICAELA SLADE   2022                                                       ______________________________________________________________________    Individual Treatment Plan    Patient's Name: Randi Cleary  YOB: 1955    Date of Creation: 20  Date Treatment Plan Last Reviewed/Revised: 3/9/22    DSM5 Diagnoses: 296.89 Bipolar II Disorder Depressed, 300.02 (F41.1) Generalized Anxiety Disorder or Adjustment Disorders  309.89 (F43.8) Other Specified Trauma and Stressor Related Disorder  Psychosocial / Contextual Factors: Patient's entire family of origin has , she now has a sister-in-law and  as support.  Relationship with  is conflictual. She is recovering from surgeries  PROMIS (reviewed every 90 days): PROMIS-10 Scores               Referral / Collaboration:  Referral to another professional/service is not indicated at this time..    Anticipated number of session for this episode of care: 50  Anticipation frequency of session: Biweekly  Anticipated Duration of each session: 53 or more minutes due to intensity of trauma symptoms  Treatment plan will be reviewed in 90 days or when goals have been changed.   There has been demonstrated improvement in functioning while patient has been engaged in psychotherapy/psychological service- if withdrawn the patient would deteriorate and/or relapse.  "      MeasurableTreatment Goal(s) related to diagnosis / functional impairment(s)  Goal 1: Patient will \"jumpstart, getting going with the things I need to be doing around the house as far as picking up, doing things, trying to do something every day.  Also to lessen the animosity between me and my .\"    I will know I've met my goal when my shoulders are fixed and I can see.      Objective #A (Patient Action)    Patient will increase frequency of engaging her in ADLs.  Status: Continued - Date(s): 12/10/21, 3/9/22    Intervention(s)  Therapist will engage patient in CBT, specifically behavioral activation.    Objective #B  Patient will  track and record at least 5 pleasant exchanges with . Patient will be able to identify at least 5 positive traits about her  and how he relates to her.  Status: Continued - Date(s): 12/10/21, 3/9/22    Intervention(s)  Therapist will teach assertiveness skills and assign homework related to relationship interactions.    Objective #C  Patient will reduce level of depressive and anxious features as evidenced by reduction in score on her CHAVO-7 and PHQ-9 (scores of 15 and 16 at first measurment, respectively).  Status: Continued - Date(s): 12/10/21, 3/9/22    Intervention(s)  Therapist will engage patient in person-centered therapy and CBT.    Patient has reviewed and agreed to the above plan.      MICAELA SLADE  March 9, 2022                                                   Randi Cleary          SAFETY PLAN:  Step 1: Warning signs / cues (Thoughts, images, mood, situation, behavior) that a crisis may be developing:  ? Thoughts: \"I don't want to continue\" \"I am unwanted\"  ? Images: none  ? Thinking Processes: ruminating  ? Mood: anger  ? Behaviors: isolating/withdrawing , can't stop crying, not taking care of myself and not taking care of my responsibilities  ? Situations: small triggers, such as not being able to find something, or dropping " "something   Step 2: Coping strategies - Things I can do to take my mind off of my problems without contacting another person (relaxation technique, physical activity):  ? Distress Tolerance Strategies:  arts and crafts: drawing, play with my pet , listen to positive and upbeat music: any, change body temperature (ice pack/cold water)  and paced breathing/progressive muscle relaxation  ? Physical Activities: go for a walk, deep breathing and stretching   ? Focus on helpful thoughts:  \"You've been through this before, you can get through it again.\"  Step 3: People and social settings that provide distraction:                 Name: Carmen                            Name: Darien                           Name: Aleida       ? pool, shopping, Carmen's house, [employer]       Step 4: Remind myself of people and things that are important to me and worth living for:  [place] Little Donna, post-COVID world, options of what could be in your future        Step 5: When I am in crisis, I can ask these people to help me use my safety plan:                 Name: Sidney  Step 6: Making the environment safe:   ? go to sleep/daydream  Step 7: Professionals or agencies I can contact during a crisis:  ? [facility] Daytime Number: [phone]  ? Suicide Prevention Lifeline: 8-318-212-KROR (0880)  ? Crisis Text Line Service (available 24 hours a day, 7 days a week): Text [place] to 173339    Local Crisis Services: [facility] Crisis: [phone]     Call 911 or go to my nearest emergency department.       I helped develop this safety plan and agree to use it when needed.  I have been given a copy of this plan.       Client signature _________________________________________________________________  Today s date:  11/24/2020  Adapted from Safety Plan Template 2008 Randi Poole and Robby Barba is reprinted with the express permission of the authors.  No portion of the Safety Plan Template may be reproduced without the " express, written permission.  You can contact the authors at [email or web address] or [email or web address].   Dr June

## 2022-11-23 NOTE — PROGRESS NOTES
[facility]                                     Progress Note    Patient Name: Randi Cleary  Date: 11/23/22         Service Type: Individual     Session Start Time: 9:02 AM  Session End Time: 9:48 AM     Session Length: 46 minutes    Session #: 141    Attendees: Client attended alone    Service Modality:  Video Visit:      Provider verified identity through the following two step process.  Patient provided:  Patient is known previously to provider    Telemedicine Visit: The patient's condition can be safely assessed and treated via synchronous audio and visual telemedicine encounter.      Reason for Telemedicine Visit: Services only offered telehealth    Originating Site (Patient Location): Patient's home    Distant Site (Provider Location): Provider Remote Setting- Home Office    Consent:  The patient/guardian has verbally consented to: the potential risks and benefits of telemedicine (video visit) versus in person care; bill my insurance or make self-payment for services provided; and responsibility for payment of non-covered services.     Patient would like the video invitation sent by:  My Chart    Mode of Communication:  [facility]    Distant Location (Provider):  Off-site    As the provider I attest to compliance with applicable laws and regulations related to telemedicine.    DATA  Extended Session (53+ minutes): No  Interactive Complexity: No  Crisis: No        Progress Since Last Session (Related to Symptoms / Goals / Homework):   Symptoms: Patient appeaars to be a little more stable than before    Homework: Progress made   Track sodas- 4-5 yesterday, 6-7 most days  Get to the pool- called, they are not open  Journal daily  Talk to next door neighbor  Follow through on day treatment referral     Episode of Care Goals: Satisfactory progress - ACTION (Actively working towards change); Intervened by reinforcing change plan / affirming steps taken     Current /  Ongoing Stressors and Concerns:   Patient is currently socially isolated. She has a conflictual relationship with her .  She is getting minimal physical activity.  She had recent eye surgery and shoulder surgery.     Treatment Objective(s) Addressed in This Session:   Patient will increase frequency of engaging her in ADLs.  Patient will track and record at least 5 pleasant exchanges with . Patient will be able to identify at least 5 positive traits about her .  Patient will reduce level of depressive and anxious features as evidenced by reduction in score on her CHAVO-7 and PHQ-9 (scores of 15 and 16 at first measurment, respectively).     Intervention:   Supportive Therapy: Reviewed homework and identified successes and barriers to completion. Reviewed soda consumption and what she has learned that is motivating her reduction. Processed medical appointments and frustration with not being able to see or communicate easily with her doctors.      The following assessments were completed by patient for this visit:  PHQ9: of note, patient reported she was in a hurry and didn't read all questions, she reports no suicidal ideation  PHQ-9 SCORE 8/16/2022 9/13/2022 9/20/2022 10/11/2022 10/13/2022 11/10/2022 11/17/2022   PHQ-9 Total Score MyChart 15 (Moderately severe depression) 7 (Mild depression) 13 (Moderate depression) 13 (Moderate depression) 17 (Moderately severe depression) 16 (Moderately severe depression) 15 (Moderately severe depression)   PHQ-9 Total Score 15 7 13 13 17 16 15   Some encounter information is confidential and restricted. Go to Review Flowsheets activity to see all data.     GAD7:   CHAVO-7 SCORE 9/2/2022 9/20/2022 10/11/2022 11/10/2022 11/10/2022 11/10/2022 11/10/2022   Total Score 13 (moderate anxiety) 13 (moderate anxiety) 15 (severe anxiety) - - - 17 (severe anxiety)   Total Score 13 13 15 17 17 17 17   Some encounter information is confidential and restricted. Go to Review  Flowsheets activity to see all data.     PROMIS 10-Global Health (only subscores and total score):   PROMIS-10 Scores Only 3/15/2022 3/24/2022 4/1/2022 6/9/2022 9/6/2022 9/6/2022 9/6/2022   Global Mental Health Score 6 8 12 12 7 7 7   Global Physical Health Score 9 8 11 10 9 9 9   PROMIS TOTAL - SUBSCORES 15 16 23 22 16 16 16        ASSESSMENT: Current Emotional / Mental Status (status of significant symptoms):   Risk status (Self / Other harm or suicidal ideation)   Patient denies current fears or concerns for personal safety.   Patient denies current or recent suicidal ideation or behaviors.   Patient denies current or recent homicidal ideation or behaviors.   Patient denies current or recent self injurious behavior or ideation.   Patient denies other safety concerns.   Patient reports there has been no change in risk factors since their last session.     Patient reports there has been no change in protective factors since their last session.     A safety and risk management plan has been developed including: Patient consented to co-developed safety plan on 11/24/2020.  Safety and risk management plan was reviewed.   Patient agreed to use safety plan should any safety concerns arise.  A copy was made available to the patient.     Appearance:   Appropriate    Eye Contact:   Good    Psychomotor Behavior: Normal    Attitude:   Cooperative    Orientation:   All   Speech    Rate / Production: Normal/ Responsive    Volume:  Normal    Mood:    Normal   Affect:    Appropriate    Thought Content:  Clear  Rumination    Thought Form:  Coherent  Obsessive    Insight:    Good      Medication Review:   No changes to current psychiatric medication(s)     Medication Compliance:   Yes     Changes in Health Issues:   None reported     Chemical Use Review:   Substance Use: Chemical use reviewed, no active concerns identified Nothing used since August 2021.     Tobacco Use: No current tobacco use.      Diagnosis:  1. Bipolar 2  disorder (H)    2. Generalized anxiety disorder    3. Trauma and stressor-related disorder      Collateral Reports Completed:   Not Applicable    PLAN: (Patient Tasks / Therapist Tasks / Other)  Reduce soda consumption: 4 a day for the goal  Get to the pool  Journal daily  Next session ask about resolution of photos withTim      There has been demonstrated improvement in functioning while patient has been engaged in psychotherapy/psychological service- if withdrawn the patient would deteriorate and/or relapse.     MICAELA SLADE   2022                                                        ______________________________________________________________________    Individual Treatment Plan    Patient's Name: Randi Cleary  YOB: 1955    Date of Creation: 20  Date Treatment Plan Last Reviewed/Revised: 22    DSM5 Diagnoses: 296.89 Bipolar II Disorder Depressed, 300.02 (F41.1) Generalized Anxiety Disorder or Adjustment Disorders  309.89 (F43.8) Other Specified Trauma and Stressor Related Disorder  Psychosocial / Contextual Factors: Patient's entire family of origin has , she now has a sister-in-law and  as support.  Relationship with  is conflictual. She is recovering from surgeries  PROMIS (reviewed every 90 days): PROMIS-10 Scores  PROMIS 10-Global Health (only subscores and total score):   PROMIS-10 Scores Only 2021 3/15/2022 3/15/2022 3/15/2022 3/24/2022 2022 2022   Global Mental Health Score 6 6 6 6 8 12 12   Global Physical Health Score 9 9 9 9 8 11 10   PROMIS TOTAL - SUBSCORES 15 15 15 15 16 23 22       Referral / Collaboration:  Referral to another professional/service is not indicated at this time..    Anticipated number of session for this episode of care: 50  Anticipation frequency of session: Biweekly  Anticipated Duration of each session: 53 or more minutes due to intensity of trauma symptoms  Treatment plan will be reviewed in 90  "days or when goals have been changed.   There has been demonstrated improvement in functioning while patient has been engaged in psychotherapy/psychological service- if withdrawn the patient would deteriorate and/or relapse.       MeasurableTreatment Goal(s) related to diagnosis / functional impairment(s)  Goal 1: Patient will \"jumpstart, getting going with the things I need to be doing around the house as far as picking up, doing things, trying to do something every day.  Also to lessen the animosity between me and my .\"    I will know I've met my goal when my shoulders are fixed and I can see.      Objective #A (Patient Action)    Patient will increase frequency of engaging her in ADLs.  Status: Continued - Date(s): 12/10/21, 3/9/22, 6/9/22, 9/2/22    Intervention(s)  Therapist will engage patient in CBT, specifically behavioral activation.    Objective #B  Patient will  track and record at least 5 pleasant exchanges with . Patient will be able to identify at least 5 positive traits about her  and how he relates to her.  Status: Continued - Date(s): 12/10/21, 3/9/22, 6/9/22, 9/2/22    Intervention(s)  Therapist will teach assertiveness skills and assign homework related to relationship interactions.    Objective #C  Patient will reduce level of depressive and anxious features as evidenced by reduction in score on her CHAVO-7 and PHQ-9 (scores of 15 and 16 at first measurment, respectively).  Status: Continued - Date(s): 12/10/21, 3/9/22, 6/9/22, 9/2/22    Intervention(s)  Therapist will engage patient in person-centered therapy and CBT.    Patient has reviewed and agreed to the above plan.      MICAELA SLADE  September 2, 2022                                                   Randi Cleary          SAFETY PLAN:  Step 1: Warning signs / cues (Thoughts, images, mood, situation, behavior) that a crisis may be developing:  ? Thoughts: \"I don't want to continue\" \"I am " "unwanted\"  ? Images: none  ? Thinking Processes: ruminating  ? Mood: anger  ? Behaviors: isolating/withdrawing , can't stop crying, not taking care of myself and not taking care of my responsibilities  ? Situations: small triggers, such as not being able to find something, or dropping something   Step 2: Coping strategies - Things I can do to take my mind off of my problems without contacting another person (relaxation technique, physical activity):  ? Distress Tolerance Strategies:  arts and crafts: drawing, play with my pet , listen to positive and upbeat music: any, change body temperature (ice pack/cold water)  and paced breathing/progressive muscle relaxation  ? Physical Activities: go for a walk, deep breathing and stretching   ? Focus on helpful thoughts:  \"You've been through this before, you can get through it again.\"  Step 3: People and social settings that provide distraction:                 Name: Carmen                            Name: Darien                           Name: Aleida       ? pool, shopping, Carmen's house, [employer]       Step 4: Remind myself of people and things that are important to me and worth living for:  [place] Little Donna, post-COVID world, options of what could be in your future        Step 5: When I am in crisis, I can ask these people to help me use my safety plan:                 Name: Sidney  Step 6: Making the environment safe:   ? go to sleep/daydream  Step 7: Professionals or agencies I can contact during a crisis:  ? [facility] Daytime Number: [phone]  ? Suicide Prevention Lifeline: 9-826-338-HQJD (3647)  ? Crisis Text Line Service (available 24 hours a day, 7 days a week): Text [place] to 247287    Local Crisis Services: [facility] Crisis: [phone]     Call 911 or go to my nearest emergency department.       I helped develop this safety plan and agree to use it when needed.  I have been given a copy of this plan.       Client " signature _________________________________________________________________  Today s date:  11/24/2020  Adapted from Safety Plan Template 2008 Randi Poole and Robby Barba is reprinted with the express permission of the authors.  No portion of the Safety Plan Template may be reproduced without the express, written permission.  You can contact the authors at [email or web address] or [email or web address].

## 2022-11-23 NOTE — TELEPHONE ENCOUNTER
[facility]    Phone Message    May a detailed message be left on voicemail: yes     Reason for Call: Medication Refill Request    Has the patient contacted the pharmacy for the refill? Yes   Name of medication being requested: HYDROcodone-acetaminophen (NORCO) 5-325 MG tablet  Provider who prescribed the medication: Silvino  Pharmacy:    [id number] [address]      Date medication is needed: by 11/25       Other: Patient has questions regarding scheduling an upcoming nerve block  procedure. Please call to discuss.    Action Taken: Other: pain    Travel Screening: Not Applicable                                                                      
Received call from patient requesting refill  Hydrocodone 5/325 mg    Last dispensed from pharmacy on 10/31/22-24 days    Patient's last office/virtual visit by prescribing provider on 11/16/22  Next office/virtual appointment scheduled for 1/18/23    UDT/CSA = 12/17/2021    HYDROcodone-acetaminophen (NORCO) 5-325 MG tablet 70 tablet 0 11/16/2022  No   Sig - Route: Take 1 tablet by mouth 3 times daily as needed for breakthrough pain or moderate to severe pain Dispense/start: may fill 11/26 for 11/28 - Oral   Sent to pharmacy as: HYDROcodone-Acetaminophen 5-325 MG Oral Tablet (NORCO)   Class: E-Prescribe   Earliest Fill Date: 11/16/2022   Order: [id number]   E-Prescribing Status: Receipt confirmed by pharmacy (11/16/2022 12:13 PM CST)             
No

## 2022-11-24 NOTE — LETTER
[facility]  [address] [place] [address]    August 20, 2021    Randi Cleary  [address] [address]      Dear Randi,    I have been attempting to reach you since our last contact. I would like to continue to work with you and provide any additional support you may need on achieving your health care related goals. I would appreciate if you would give me a call at [phone] to let me know if you would like to continue working together. I know that there are many things that can affect our ability to communicate and I hope we can continue to work together.    All of us at the [facility] are invested in your health and are here to assist you in meeting your goals.     Sincerely,    Cari Denise  
Awake/Alert

## 2022-11-28 DIAGNOSIS — G95.20 CORD COMPRESSION (H): ICD-10-CM

## 2022-11-28 DIAGNOSIS — M47.12 CERVICAL SPONDYLOSIS WITH MYELOPATHY: ICD-10-CM

## 2022-11-28 DIAGNOSIS — M54.12 CERVICAL RADICULOPATHY: ICD-10-CM

## 2022-11-28 RX ORDER — METHOCARBAMOL 500 MG/1
500 TABLET, FILM COATED ORAL 3 TIMES DAILY
Qty: 90 TABLET | Refills: 3 | Status: SHIPPED | OUTPATIENT
Start: 2022-11-28 | End: 2023-04-05

## 2022-11-28 NOTE — TELEPHONE ENCOUNTER
I spoke to patient and let her know Dr. Diana is booked and here only until 12-19-22.  Waiting on Dr. Jackson's schedule to open, other locations are booked out to late Dec early [place] as well.

## 2022-11-28 NOTE — TELEPHONE ENCOUNTER
Received fax request from [facility] pharmacy requesting refill(s) for Methocarbamol 500 mg tablet    Last refilled on 9/15/2022    Pt last seen on 11/16/2022  Next appt scheduled for 1/18/2023    Pending Prescriptions:                       Disp   Refills    methocarbamol (ROBAXIN) 500 MG tablet                         Sig: Take 1 tablet (500 mg) by mouth 3 times daily    Spoke with patient, she is taking Methocarbamol 3 times a day, gave her updates on her Medical Cannabis renewal and order is in her chart for the occipital nerve block. Will have  staff call patient and set up her procedure appointment.     Pamela Ayers LPN  [facility]

## 2022-11-29 ENCOUNTER — VIRTUAL VISIT (OUTPATIENT)
Dept: PSYCHOLOGY | Facility: CLINIC | Age: 67
End: 2022-11-29
Payer: MEDICARE

## 2022-11-29 DIAGNOSIS — F41.1 GENERALIZED ANXIETY DISORDER: ICD-10-CM

## 2022-11-29 DIAGNOSIS — F43.9 TRAUMA AND STRESSOR-RELATED DISORDER: ICD-10-CM

## 2022-11-29 DIAGNOSIS — F31.81 BIPOLAR 2 DISORDER (H): Primary | ICD-10-CM

## 2022-11-29 PROCEDURE — 90837 PSYTX W PT 60 MINUTES: CPT | Mod: 95 | Performed by: SOCIAL WORKER

## 2022-11-29 NOTE — PROGRESS NOTES
[facility]                                     Progress Note    Patient Name: Randi Cleary  Date: 11/29/22         Service Type: Individual     Session Start Time: 8:34 AM  Session End Time: 9:27 AM     Session Length: 53 minutes    Session #: 142    Attendees: Client attended alone    Service Modality:  Video Visit:      Provider verified identity through the following two step process.  Patient provided:  Patient is known previously to provider    Telemedicine Visit: The patient's condition can be safely assessed and treated via synchronous audio and visual telemedicine encounter.      Reason for Telemedicine Visit: Services only offered telehealth    Originating Site (Patient Location): Patient's home    Distant Site (Provider Location): Provider Remote Setting- Home Office    Consent:  The patient/guardian has verbally consented to: the potential risks and benefits of telemedicine (video visit) versus in person care; bill my insurance or make self-payment for services provided; and responsibility for payment of non-covered services.     Patient would like the video invitation sent by:  My Chart    Mode of Communication:  Video Conference via [facility]    Distant Location (Provider):  Off-site    As the provider I attest to compliance with applicable laws and regulations related to telemedicine.    DATA  Extended Session (53+ minutes): PROLONGED SERVICE IN THE OUTPATIENT SETTING REQUIRING DIRECT (FACE-TO-FACE) PATIENT CONTACT BEYOND THE USUAL SERVICE:    - High distress and under complex circumstances.  See Data section for details  Interactive Complexity: No  Crisis: No        Progress Since Last Session (Related to Symptoms / Goals / Homework):   Symptoms: Patient appears to be stable at the moment, despite several frustrations with feeling unproductive and not as connected as she would like    Homework: Progress made   Reduce soda consumption: 4 a day for the goal- has had 4 a day,  done!  Get to the pool  Journal daily  Next session ask about resolution of photos withTim     Episode of Care Goals: Satisfactory progress - ACTION (Actively working towards change); Intervened by reinforcing change plan / affirming steps taken     Current / Ongoing Stressors and Concerns:   Patient is currently socially isolated. She has a conflictual relationship with her .  She is getting minimal physical activity.  She had recent eye surgery and shoulder surgery.     Treatment Objective(s) Addressed in This Session:   Patient will increase frequency of engaging her in ADLs.  Patient will track and record at least 5 pleasant exchanges with . Patient will be able to identify at least 5 positive traits about her .  Patient will reduce level of depressive and anxious features as evidenced by reduction in score on her CHAVO-7 and PHQ-9 (scores of 15 and 16 at first measurment, respectively).     Intervention:   Solution Focused:   Processed holiday and relationships with peers and family.  Help patient find grounding through some of the challenges in these relationships. Reviewed homework and identified successes and barriers to completion.  Discussed how soda consumption was impacting her.    The following assessments were completed by patient for this visit:  PHQ9: of note, patient reported she was in a hurry and didn't read all questions, she reports no suicidal ideation  PHQ-9 SCORE 9/13/2022 9/20/2022 10/11/2022 10/13/2022 11/10/2022 11/17/2022 11/29/2022   PHQ-9 Total Score MyChart 7 (Mild depression) 13 (Moderate depression) 13 (Moderate depression) 17 (Moderately severe depression) 16 (Moderately severe depression) 15 (Moderately severe depression) 8 (Mild depression)   PHQ-9 Total Score 7 13 13 17 16 15 8   Some encounter information is confidential and restricted. Go to Review Flowsheets activity to see all data.     GAD7:   CHAVO-7 SCORE 9/2/2022 9/20/2022 10/11/2022 11/10/2022 11/10/2022  11/10/2022 11/10/2022   Total Score 13 (moderate anxiety) 13 (moderate anxiety) 15 (severe anxiety) - - - 17 (severe anxiety)   Total Score 13 13 15 17 17 17 17   Some encounter information is confidential and restricted. Go to Review Flowsheets activity to see all data.     PROMIS 10-Global Health (only subscores and total score):   PROMIS-10 Scores Only 3/15/2022 3/24/2022 4/1/2022 6/9/2022 9/6/2022 9/6/2022 9/6/2022   Global Mental Health Score 6 8 12 12 7 7 7   Global Physical Health Score 9 8 11 10 9 9 9   PROMIS TOTAL - SUBSCORES 15 16 23 22 16 16 16        ASSESSMENT: Current Emotional / Mental Status (status of significant symptoms):   Risk status (Self / Other harm or suicidal ideation)   Patient denies current fears or concerns for personal safety.   Patient denies current or recent suicidal ideation or behaviors.   Patient denies current or recent homicidal ideation or behaviors.   Patient denies current or recent self injurious behavior or ideation.   Patient denies other safety concerns.   Patient reports there has been no change in risk factors since their last session.     Patient reports there has been no change in protective factors since their last session.     A safety and risk management plan has been developed including: Patient consented to co-developed safety plan on 11/24/2020.  Safety and risk management plan was reviewed.   Patient agreed to use safety plan should any safety concerns arise.  A copy was made available to the patient.     Appearance:   Appropriate    Eye Contact:   Good    Psychomotor Behavior: Normal    Attitude:   Cooperative    Orientation:   All   Speech    Rate / Production: Normal/ Responsive    Volume:  Normal    Mood:    Normal   Affect:    Appropriate    Thought Content:  Clear  Rumination    Thought Form:  Coherent  Obsessive    Insight:    Good      Medication Review:   No changes to current psychiatric medication(s)     Medication Compliance:   Yes     Changes in  Health Issues:   None reported     Chemical Use Review:   Substance Use: Chemical use reviewed, no active concerns identified Nothing used since 2021.     Tobacco Use: No current tobacco use.      Diagnosis:  1. Bipolar 2 disorder (H)    2. Generalized anxiety disorder    3. Trauma and stressor-related disorder      Collateral Reports Completed:   Not Applicable    PLAN: (Patient Tasks / Therapist Tasks / Other)  Reduce soda consumption: 3 a day for the goal  Get to the pool  Journal daily  Next session ask about the cat who kept biting her      There has been demonstrated improvement in functioning while patient has been engaged in psychotherapy/psychological service- if withdrawn the patient would deteriorate and/or relapse.     MICAELA SLADE   2022                                                        ______________________________________________________________________    Individual Treatment Plan    Patient's Name: Randi Cleary  YOB: 1955    Date of Creation: 20  Date Treatment Plan Last Reviewed/Revised: 22    DSM5 Diagnoses: 296.89 Bipolar II Disorder Depressed, 300.02 (F41.1) Generalized Anxiety Disorder or Adjustment Disorders  309.89 (F43.8) Other Specified Trauma and Stressor Related Disorder  Psychosocial / Contextual Factors: Patient's entire family of origin has , she now has a sister-in-law and  as support.  Relationship with  is conflictual. She is recovering from surgeries  PROMIS (reviewed every 90 days): PROMIS-10 Scores  PROMIS 10-Global Health (only subscores and total score):   PROMIS-10 Scores Only 2021 3/15/2022 3/15/2022 3/15/2022 3/24/2022 2022 2022   Global Mental Health Score 6 6 6 6 8 12 12   Global Physical Health Score 9 9 9 9 8 11 10   PROMIS TOTAL - SUBSCORES 15 15 15 15 16 23 22       Referral / Collaboration:  Referral to another professional/service is not indicated at this  "time..    Anticipated number of session for this episode of care: 50  Anticipation frequency of session: Biweekly  Anticipated Duration of each session: 53 or more minutes due to intensity of trauma symptoms  Treatment plan will be reviewed in 90 days or when goals have been changed.   There has been demonstrated improvement in functioning while patient has been engaged in psychotherapy/psychological service- if withdrawn the patient would deteriorate and/or relapse.       MeasurableTreatment Goal(s) related to diagnosis / functional impairment(s)  Goal 1: Patient will \"jumpstart, getting going with the things I need to be doing around the house as far as picking up, doing things, trying to do something every day.  Also to lessen the animosity between me and my .\"    I will know I've met my goal when my shoulders are fixed and I can see.      Objective #A (Patient Action)    Patient will increase frequency of engaging her in ADLs.  Status: Continued - Date(s): 12/10/21, 3/9/22, 6/9/22, 9/2/22    Intervention(s)  Therapist will engage patient in CBT, specifically behavioral activation.    Objective #B  Patient will  track and record at least 5 pleasant exchanges with . Patient will be able to identify at least 5 positive traits about her  and how he relates to her.  Status: Continued - Date(s): 12/10/21, 3/9/22, 6/9/22, 9/2/22    Intervention(s)  Therapist will teach assertiveness skills and assign homework related to relationship interactions.    Objective #C  Patient will reduce level of depressive and anxious features as evidenced by reduction in score on her CHAVO-7 and PHQ-9 (scores of 15 and 16 at first measurment, respectively).  Status: Continued - Date(s): 12/10/21, 3/9/22, 6/9/22, 9/2/22    Intervention(s)  Therapist will engage patient in person-centered therapy and CBT.    Patient has reviewed and agreed to the above plan.      MICAELA SLADE  September 2, " "2022                                                   Randi Cleary          SAFETY PLAN:  Step 1: Warning signs / cues (Thoughts, images, mood, situation, behavior) that a crisis may be developing:  ? Thoughts: \"I don't want to continue\" \"I am unwanted\"  ? Images: none  ? Thinking Processes: ruminating  ? Mood: anger  ? Behaviors: isolating/withdrawing , can't stop crying, not taking care of myself and not taking care of my responsibilities  ? Situations: small triggers, such as not being able to find something, or dropping something   Step 2: Coping strategies - Things I can do to take my mind off of my problems without contacting another person (relaxation technique, physical activity):  ? Distress Tolerance Strategies:  arts and crafts: drawing, play with my pet , listen to positive and upbeat music: any, change body temperature (ice pack/cold water)  and paced breathing/progressive muscle relaxation  ? Physical Activities: go for a walk, deep breathing and stretching   ? Focus on helpful thoughts:  \"You've been through this before, you can get through it again.\"  Step 3: People and social settings that provide distraction:                 Name: Carmen                            Name: Darien                           Name: Aleida       ? pool, shopping, Carmen's house, [employer]       Step 4: Remind myself of people and things that are important to me and worth living for:  Clifford Little Donna, post-COVID world, options of what could be in your future        Step 5: When I am in crisis, I can ask these people to help me use my safety plan:                 Name: Sidney  Step 6: Making the environment safe:   ? go to sleep/daydream  Step 7: Professionals or agencies I can contact during a crisis:  ? [facility] Daytime Number: [phone]  ? Suicide Prevention Lifeline: 7-958-546-ZVAE (8615)  ? Crisis Text Line Service (available 24 hours a day, 7 days a week): Text [place] to 307934    Local Crisis " Services: [facility] Crisis: [phone]     Call 911 or go to my nearest emergency department.       I helped develop this safety plan and agree to use it when needed.  I have been given a copy of this plan.       Client signature _________________________________________________________________  Today s date:  11/24/2020  Adapted from Safety Plan Template 2008 Randi Poole and Robby Barba is reprinted with the express permission of the authors.  No portion of the Safety Plan Template may be reproduced without the express, written permission.  You can contact the authors at [email or web address] or [email or web address].

## 2022-12-01 ENCOUNTER — VIRTUAL VISIT (OUTPATIENT)
Dept: PSYCHOLOGY | Facility: CLINIC | Age: 67
End: 2022-12-01
Payer: MEDICARE

## 2022-12-01 DIAGNOSIS — F41.1 GENERALIZED ANXIETY DISORDER: ICD-10-CM

## 2022-12-01 DIAGNOSIS — F43.9 TRAUMA AND STRESSOR-RELATED DISORDER: ICD-10-CM

## 2022-12-01 DIAGNOSIS — F31.81 BIPOLAR 2 DISORDER (H): Primary | ICD-10-CM

## 2022-12-01 DIAGNOSIS — E89.0 POSTABLATIVE HYPOTHYROIDISM: ICD-10-CM

## 2022-12-01 PROCEDURE — 90837 PSYTX W PT 60 MINUTES: CPT | Mod: 95 | Performed by: SOCIAL WORKER

## 2022-12-01 RX ORDER — LEVOTHYROXINE SODIUM 150 MCG
150 TABLET ORAL DAILY
Qty: 90 TABLET | Refills: 4 | Status: SHIPPED | OUTPATIENT
Start: 2022-12-01 | End: 2024-01-03

## 2022-12-01 ASSESSMENT — PATIENT HEALTH QUESTIONNAIRE - PHQ9
10. IF YOU CHECKED OFF ANY PROBLEMS, HOW DIFFICULT HAVE THESE PROBLEMS MADE IT FOR YOU TO DO YOUR WORK, TAKE CARE OF THINGS AT HOME, OR GET ALONG WITH OTHER PEOPLE: VERY DIFFICULT
SUM OF ALL RESPONSES TO PHQ QUESTIONS 1-9: 16
SUM OF ALL RESPONSES TO PHQ QUESTIONS 1-9: 16

## 2022-12-01 NOTE — PROGRESS NOTES
[facility]                                     Progress Note    Patient Name: Randi Cleary  Date: 12/1/22         Service Type: Individual     Session Start Time: 9:32 AM  Session End Time: 10:26 AM     Session Length: 53 minutes    Session #: 143    Attendees: Client attended alone    Service Modality:  Video Visit:      Provider verified identity through the following two step process.  Patient provided:  Patient is known previously to provider    Telemedicine Visit: The patient's condition can be safely assessed and treated via synchronous audio and visual telemedicine encounter.      Reason for Telemedicine Visit: Services only offered telehealth    Originating Site (Patient Location): Patient's home    Distant Site (Provider Location): Provider Remote Setting- Home Office    Consent:  The patient/guardian has verbally consented to: the potential risks and benefits of telemedicine (video visit) versus in person care; bill my insurance or make self-payment for services provided; and responsibility for payment of non-covered services.     Patient would like the video invitation sent by:  My Chart    Mode of Communication:  Video Conference via [facility]    Distant Location (Provider):  Off-site    As the provider I attest to compliance with applicable laws and regulations related to telemedicine.    DATA  Extended Session (53+ minutes): PROLONGED SERVICE IN THE OUTPATIENT SETTING REQUIRING DIRECT (FACE-TO-FACE) PATIENT CONTACT BEYOND THE USUAL SERVICE:    - High distress and under complex circumstances.  See Data section for details  Interactive Complexity: No  Crisis: No        Progress Since Last Session (Related to Symptoms / Goals / Homework):   Symptoms: Patient is continuing to have frustrations, but is managing as best she can    Homework: Progress made   Reduce soda consumption: 3 a day for the goal  Get to the pool  Journal daily  Next session ask about the cat who kept biting  "her     Episode of Care Goals: Satisfactory progress - ACTION (Actively working towards change); Intervened by reinforcing change plan / affirming steps taken     Current / Ongoing Stressors and Concerns:   Patient is currently socially isolated. She has a conflictual relationship with her .  She is getting minimal physical activity.  She had recent eye surgery and shoulder surgery.     Treatment Objective(s) Addressed in This Session:   Patient will increase frequency of engaging her in ADLs.  Patient will track and record at least 5 pleasant exchanges with . Patient will be able to identify at least 5 positive traits about her .  Patient will reduce level of depressive and anxious features as evidenced by reduction in score on her CHAVO-7 and PHQ-9 (scores of 15 and 16 at first measurment, respectively).     Intervention:   Solution Focused:   She reports \"the more that goes wrong, the more I'm unable to accomplish my tasks.\" Processed how she has good days and bad days and how to build off the good days. She also reports being up a few hours in the middle of the night every night.  Discussed how currently when one thing happens, her whole day is derailed.  Discussed ways to overcome when one thing happens, including potentially having a private space to recharge.  Talked about prioritizing making space in her bedroom      The following assessments were completed by patient for this visit:  PHQ9: of note, patient reported she was in a hurry and didn't read all questions, she reports no suicidal ideation  PHQ-9 SCORE 9/20/2022 10/11/2022 10/13/2022 11/10/2022 11/17/2022 11/29/2022 12/1/2022   PHQ-9 Total Score MyChart 13 (Moderate depression) 13 (Moderate depression) 17 (Moderately severe depression) 16 (Moderately severe depression) 15 (Moderately severe depression) 8 (Mild depression) 16 (Moderately severe depression)   PHQ-9 Total Score 13 13 17 16 15 8 16   Some encounter information is " confidential and restricted. Go to Review Flowsheets activity to see all data.     GAD7:   CHAVO-7 SCORE 9/2/2022 9/20/2022 10/11/2022 11/10/2022 11/10/2022 11/10/2022 11/10/2022   Total Score 13 (moderate anxiety) 13 (moderate anxiety) 15 (severe anxiety) - - - 17 (severe anxiety)   Total Score 13 13 15 17 17 17 17   Some encounter information is confidential and restricted. Go to Review Flowsheets activity to see all data.     PROMIS 10-Global Health (only subscores and total score):   PROMIS-10 Scores Only 3/15/2022 3/24/2022 4/1/2022 6/9/2022 9/6/2022 9/6/2022 9/6/2022   Global Mental Health Score 6 8 12 12 7 7 7   Global Physical Health Score 9 8 11 10 9 9 9   PROMIS TOTAL - SUBSCORES 15 16 23 22 16 16 16        ASSESSMENT: Current Emotional / Mental Status (status of significant symptoms):   Risk status (Self / Other harm or suicidal ideation)   Patient denies current fears or concerns for personal safety.   Patient denies current or recent suicidal ideation or behaviors.   Patient denies current or recent homicidal ideation or behaviors.   Patient denies current or recent self injurious behavior or ideation.   Patient denies other safety concerns.   Patient reports there has been no change in risk factors since their last session.     Patient reports there has been no change in protective factors since their last session.     A safety and risk management plan has been developed including: Patient consented to co-developed safety plan on 11/24/2020.  Safety and risk management plan was reviewed.   Patient agreed to use safety plan should any safety concerns arise.  A copy was made available to the patient.     Appearance:   Appropriate    Eye Contact:   Good    Psychomotor Behavior: Normal    Attitude:   Cooperative    Orientation:   All   Speech    Rate / Production: Normal/ Responsive    Volume:  Normal    Mood:    Normal   Affect:    Appropriate    Thought Content:  Clear  Rumination    Thought  Form:  Coherent  Obsessive    Insight:    Good      Medication Review:   No changes to current psychiatric medication(s)     Medication Compliance:   Yes     Changes in Health Issues:   None reported     Chemical Use Review:   Substance Use: Chemical use reviewed, no active concerns identified Nothing used since 2021.     Tobacco Use: No current tobacco use.      Diagnosis:  1. Bipolar 2 disorder (H)    2. Generalized anxiety disorder    3. Trauma and stressor-related disorder      Collateral Reports Completed:   Not Applicable    PLAN: (Patient Tasks / Therapist Tasks / Other)  Move towards the bedroom  Reduce soda consumption: 3 a day for the goal  Get to the pool  Journal daily  Next session ask about the cat who kept biting her      There has been demonstrated improvement in functioning while patient has been engaged in psychotherapy/psychological service- if withdrawn the patient would deteriorate and/or relapse.     MICAELA SLADE   2022                                                        ______________________________________________________________________    Individual Treatment Plan    Patient's Name: Randi Cleary  YOB: 1955    Date of Creation: 20  Date Treatment Plan Last Reviewed/Revised: 22    DSM5 Diagnoses: 296.89 Bipolar II Disorder Depressed, 300.02 (F41.1) Generalized Anxiety Disorder or Adjustment Disorders  309.89 (F43.8) Other Specified Trauma and Stressor Related Disorder  Psychosocial / Contextual Factors: Patient's entire family of origin has , she now has a sister-in-law and  as support.  Relationship with  is conflictual. She is recovering from surgeries  PROMIS (reviewed every 90 days): PROMIS-10 Scores  PROMIS 10-Global Health (only subscores and total score):   PROMIS-10 Scores Only 2021 3/15/2022 3/15/2022 3/15/2022 3/24/2022 2022 2022   Global Mental Health Score 6 6 6 6 8 12 12   Global Physical  "Health Score 9 9 9 9 8 11 10   PROMIS TOTAL - SUBSCORES 15 15 15 15 16 23 22       Referral / Collaboration:  Referral to another professional/service is not indicated at this time..    Anticipated number of session for this episode of care: 50  Anticipation frequency of session: Biweekly  Anticipated Duration of each session: 53 or more minutes due to intensity of trauma symptoms  Treatment plan will be reviewed in 90 days or when goals have been changed.   There has been demonstrated improvement in functioning while patient has been engaged in psychotherapy/psychological service- if withdrawn the patient would deteriorate and/or relapse.       MeasurableTreatment Goal(s) related to diagnosis / functional impairment(s)  Goal 1: Patient will \"jumpstart, getting going with the things I need to be doing around the house as far as picking up, doing things, trying to do something every day.  Also to lessen the animosity between me and my .\"    I will know I've met my goal when my shoulders are fixed and I can see.      Objective #A (Patient Action)    Patient will increase frequency of engaging her in ADLs.  Status: Continued - Date(s): 12/10/21, 3/9/22, 6/9/22, 9/2/22, 12/1/22    Intervention(s)  Therapist will engage patient in CBT, specifically behavioral activation.    Objective #B  Patient will  track and record at least 5 pleasant exchanges with . Patient will be able to identify at least 5 positive traits about her  and how he relates to her.  Status: Continued - Date(s): 12/10/21, 3/9/22, 6/9/22, 9/2/22, 12/1/22    Intervention(s)  Therapist will teach assertiveness skills and assign homework related to relationship interactions.    Objective #C  Patient will reduce level of depressive and anxious features as evidenced by reduction in score on her CHAVO-7 and PHQ-9 (scores of 15 and 16 at first measurment, respectively).  Status: Continued - Date(s): 12/10/21, 3/9/22, 6/9/22, 9/2/22, " "12/1/22    Intervention(s)  Therapist will engage patient in person-centered therapy and CBT.    Patient has reviewed and agreed to the above plan.      MICAELA SLADE  December 1, 2022                                                   Randi Cleary          SAFETY PLAN:  Step 1: Warning signs / cues (Thoughts, images, mood, situation, behavior) that a crisis may be developing:  ? Thoughts: \"I don't want to continue\" \"I am unwanted\"  ? Images: none  ? Thinking Processes: ruminating  ? Mood: anger  ? Behaviors: isolating/withdrawing , can't stop crying, not taking care of myself and not taking care of my responsibilities  ? Situations: small triggers, such as not being able to find something, or dropping something   Step 2: Coping strategies - Things I can do to take my mind off of my problems without contacting another person (relaxation technique, physical activity):  ? Distress Tolerance Strategies:  arts and crafts: drawing, play with my pet , listen to positive and upbeat music: any, change body temperature (ice pack/cold water)  and paced breathing/progressive muscle relaxation  ? Physical Activities: go for a walk, deep breathing and stretching   ? Focus on helpful thoughts:  \"You've been through this before, you can get through it again.\"  Step 3: People and social settings that provide distraction:                 Name: Carmen                            Name: Darien                           Name: Aleida       ? pool, shopping, Carmen's house, [employer]       Step 4: Remind myself of people and things that are important to me and worth living for:  Sidney, Aleida Horton, post-COVID world, options of what could be in your future        Step 5: When I am in crisis, I can ask these people to help me use my safety plan:                 Name: Sidney  Step 6: Making the environment safe:   ? go to sleep/daydream  Step 7: Professionals or agencies I can contact during a crisis:  ? [facility] Daytime " Number: [phone]  ? Suicide Prevention Lifeline: 3-822-767-TALK (0346)  ? Crisis Text Line Service (available 24 hours a day, 7 days a week): Text [place] to 832194    Local Crisis Services: [facility] Crisis: [phone]     Call 911 or go to my nearest emergency department.       I helped develop this safety plan and agree to use it when needed.  I have been given a copy of this plan.       Client signature _________________________________________________________________  Today s date:  11/24/2020  Adapted from Safety Plan Template 2008 Randi Poole and Robby Barba is reprinted with the express permission of the authors.  No portion of the Safety Plan Template may be reproduced without the express, written permission.  You can contact the authors at [email or web address] or [email or web address].

## 2022-12-01 NOTE — TELEPHONE ENCOUNTER
Frandy Coker,    Is first available and add to wait list appropriate for this patient or would you like us to find her something sooner? Please advise when able.    Thank you,  Wilner

## 2022-12-02 ENCOUNTER — MYC MEDICAL ADVICE (OUTPATIENT)
Dept: INTERNAL MEDICINE | Facility: CLINIC | Age: 67
End: 2022-12-02

## 2022-12-02 ENCOUNTER — HOSPITAL ENCOUNTER (OUTPATIENT)
Dept: CT IMAGING | Facility: HOSPITAL | Age: 67
Discharge: HOME | End: 2022-12-02
Attending: NURSE PRACTITIONER | Admitting: NURSE PRACTITIONER
Payer: MEDICARE

## 2022-12-02 DIAGNOSIS — M54.2 NECK PAIN: ICD-10-CM

## 2022-12-02 PROCEDURE — G1010 CDSM STANSON: HCPCS

## 2022-12-02 NOTE — TELEPHONE ENCOUNTER
Hi Dr. Coker,     Pt would like to know if you would like her to complete any labs or orders prior to her appointment on 1/2024? Please advise when able.     Thank you,   -Sonal

## 2022-12-05 DIAGNOSIS — E89.0 POSTABLATIVE HYPOTHYROIDISM: Primary | ICD-10-CM

## 2022-12-05 DIAGNOSIS — I27.20 PULMONARY HYPERTENSION (H): ICD-10-CM

## 2022-12-05 NOTE — TELEPHONE ENCOUNTER
Please review with her the AVS from our last visit. We will decide what is needed when we see her at her next visit.  Until then, her primary care doctor should continue to direct her care.

## 2022-12-07 ENCOUNTER — VIRTUAL VISIT (OUTPATIENT)
Dept: INTERNAL MEDICINE | Facility: CLINIC | Age: 67
End: 2022-12-07
Payer: MEDICARE

## 2022-12-07 DIAGNOSIS — M54.2 NECK PAIN: Primary | ICD-10-CM

## 2022-12-07 DIAGNOSIS — F31.81 BIPOLAR 2 DISORDER (H): ICD-10-CM

## 2022-12-07 DIAGNOSIS — M65.30 TRIGGER FINGER, ACQUIRED: ICD-10-CM

## 2022-12-07 DIAGNOSIS — I27.20 PULMONARY HYPERTENSION (H): ICD-10-CM

## 2022-12-07 DIAGNOSIS — E83.119 HEMOCHROMATOSIS, UNSPECIFIED HEMOCHROMATOSIS TYPE: ICD-10-CM

## 2022-12-07 DIAGNOSIS — E66.01 MORBID OBESITY (H): ICD-10-CM

## 2022-12-07 DIAGNOSIS — E11.9 TYPE 2 DIABETES MELLITUS WITHOUT COMPLICATION, WITHOUT LONG-TERM CURRENT USE OF INSULIN (H): ICD-10-CM

## 2022-12-07 DIAGNOSIS — I10 ESSENTIAL HYPERTENSION: ICD-10-CM

## 2022-12-07 DIAGNOSIS — R42 LIGHT HEADED: ICD-10-CM

## 2022-12-07 PROCEDURE — 99214 OFFICE O/P EST MOD 30 MIN: CPT | Mod: 95 | Performed by: INTERNAL MEDICINE

## 2022-12-07 RX ORDER — ETHACRYNIC ACID 25 MG/1
TABLET ORAL
Qty: 45 TABLET | Refills: 3 | Status: SHIPPED | OUTPATIENT
Start: 2022-12-07 | End: 2022-12-09

## 2022-12-07 NOTE — PROGRESS NOTES
"Winnie is a 67 year old who is being evaluated via a billable video visit.      How would you like to obtain your AVS? MyChart  If the video visit is dropped, the invitation should be resent by: Text to cell phone: [phone]  Will anyone else be joining your video visit? No          Assessment & Plan     Neck pain/ Cervical Stenosis/s/p surgery  Ongoing issues with neck pain-spine surgery approximately 1 year ago.  Following with the pain center.  She is scheduled for an occipital nerve block.  She will proceed.  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    Type 2 diabetes mellitus without complication, without long-term current use of insulin (H)  Now off medications with normalization of last Kuldeep hemoglobin.  She is due for recheck-not emergently  - HEMOGLOBIN A1C    Essential hypertension  Stable- status post excision of benign pheochromocytoma in 2020.  Blood pressures have remained stable    Pulmonary hypertension (H)  Followed by [facility] specialist    Hemochromatosis, unspecified hemochromatosis type  Followed by [facility] hematologist    Trigger finger, acquired  Encouraged her to follow-up with hand surgery    Bipolar 2 disorder (H)  Psychiatric disorder had been complicated in the past by serotonin syndrome.  She is no longer under the care of her previous psychiatrist and Dr. Dubois-her pain specialist who is also psychiatrist is managing this portion of her care as well.  She is on a compounded dose of lithium/etc.  Mood is labile    Morbid obesity (H)  Discussion was had today regarding weight loss strategies.  She binges on carbs such as milk chocolate, bread/etc.    Remote history of breast cancer/needs follow-up regarding recent biopsy.  Remote history of substance abuse-alcohol- currently does not use.    BMI:   Estimated body mass index is 39.22 kg/m  as calculated from the following:    Height as of 11/17/22: 1.676 m (5' 6\").    Weight as of 11/17/22: 110.2 kg (243 lb).   Weight " management plan: Discussed healthy diet and exercise guidelines        No follow-ups on file.    Loida Stockton MD  [facility]    Subjective   Winnie is a 67 year old, presenting for the following health issues:  Follow Up (Worried about finding new provider in refills and blood tests. She is currently upset. Think that clinics are terrible now. )      Winnie is seen here today on a video visit.  Of note, she has a longstanding history of being seen by me.  Her medical history is complex.  She has been on medical disability for several years.  Of note, she has multiple specialist involved in her care.  From internal medicine point of view, incoordination is the most important aspect.  She has a cardiologist for her pulmonary hypertension/pain medicine specialist for psychiatric and pain needs/neurosurgery for her spinal stenosis/[facility] for her multiple musculoskeletal issues.  She is also seen by endocrinology as she has had a remote history of a pheochromocytoma and has chosen to follow along with rheumatology for her thyroid issues.  She also is seen by pulmonary for COPD.    Therefore, from internal medicine point to view, we are not managing any specific primary issue.  She is looking for moral support/etc.    Today, we recapped her medical problems.  Her most pressing issue today is her neck pain.  She does have an occipital nerve block scheduled.  The notes from the pain clinic and neurosurgery are reviewed.  Her MRI is reviewed specifically with her.  New    Additionally, we discussed blood pressure, weight and mental health.       Current Outpatient Medications   Medication     acetaminophen (TYLENOL) 325 MG tablet     albuterol (PROVENTIL) (2.5 MG/3ML) 0.083% neb solution     albuterol (VENTOLIN HFA) 108 (90 Base) MCG/ACT inhaler     Cholecalciferol (VITAMIN D3) 250 MCG (78914 UT) TABS     Cyanocobalamin (VITAMIN B-12) 5000 MCG SUBL     ethacrynic acid  (EDECRIN) 25 MG tablet     Fluticasone-Umeclidin-Vilanterol (TRELEGY ELLIPTA) 200-62.5-25 MCG/INH oral inhaler     guaiFENesin (MUCINEX) 600 MG 12 hr tablet     HYDROcodone-acetaminophen (NORCO) 5-325 MG tablet     hydrOXYzine (ATARAX) 25 MG tablet     KLOR-CON 20 MEQ CR tablet     LITHIUM PO     magnesium 250 MG tablet     medical cannabis (Patient's own supply)     methocarbamol (ROBAXIN) 500 MG tablet     omeprazole (PRILOSEC) 20 MG DR capsule     OXcarbazepine (TRILEPTAL) 150 MG tablet     rOPINIRole (REQUIP) 2 MG tablet     SYNTHROID 150 MCG tablet     vitamin E 400 units TABS     No current facility-administered medications for this visit.           Objective           Vitals:  No vitals were obtained today due to virtual visit.    Physical Exam   GENERAL: Healthy, alert and no distress  EYES: Eyes grossly normal to inspection.  No discharge or erythema, or obvious scleral/conjunctival abnormalities.  RESP: No audible wheeze, cough, or visible cyanosis.  No visible retractions or increased work of breathing.    SKIN: Visible skin clear. No significant rash, abnormal pigmentation or lesions.  NEURO: Cranial nerves grossly intact.  Mentation and speech appropriate for age.  PSYCH: Mentation appears normal, affect normal/bright, judgement and insight intact, normal speech and appearance well-groomed.                Video-Visit Details    Video Start Time: 11:45 Am    Type of service:  Video Visit    Video End Time:12:24 PM    Originating Location (pt. Location): Home        Distant Location (provider location):  Off-site    Platform used for Video Visit: [employer]

## 2022-12-08 ENCOUNTER — MYC MEDICAL ADVICE (OUTPATIENT)
Dept: CARDIOLOGY | Facility: CLINIC | Age: 67
End: 2022-12-08

## 2022-12-08 ENCOUNTER — TELEPHONE (OUTPATIENT)
Dept: INTERNAL MEDICINE | Facility: CLINIC | Age: 67
End: 2022-12-08

## 2022-12-08 DIAGNOSIS — R06.02 SOB (SHORTNESS OF BREATH): ICD-10-CM

## 2022-12-08 DIAGNOSIS — E11.9 DIABETES MELLITUS (H): Primary | ICD-10-CM

## 2022-12-08 DIAGNOSIS — I27.20 PULMONARY HYPERTENSION (H): ICD-10-CM

## 2022-12-08 RX ORDER — ETHACRYNIC ACID 25 MG/1
25 TABLET ORAL EVERY OTHER DAY
Qty: 45 TABLET | Refills: 3 | Status: SHIPPED | OUTPATIENT
Start: 2022-12-08 | End: 2022-12-09

## 2022-12-08 NOTE — TELEPHONE ENCOUNTER
ethacrynic acid (EDECRIN) 25 MG tablet is this 1 PO every other day.  We need specific directions

## 2022-12-09 RX ORDER — ETHACRYNIC ACID 25 MG/1
25 TABLET ORAL EVERY OTHER DAY
Qty: 45 TABLET | Refills: 3 | Status: SHIPPED | OUTPATIENT
Start: 2022-12-09 | End: 2023-11-16

## 2022-12-09 NOTE — TELEPHONE ENCOUNTER
Refill sent to local pharmacy per patient's request; can only use [facility] for her medications. Bindu Walden RN on 12/9/2022 at 1:19 PM

## 2022-12-15 ENCOUNTER — VIRTUAL VISIT (OUTPATIENT)
Dept: PSYCHOLOGY | Facility: CLINIC | Age: 67
End: 2022-12-15
Payer: MEDICARE

## 2022-12-15 DIAGNOSIS — F31.81 BIPOLAR 2 DISORDER (H): Primary | ICD-10-CM

## 2022-12-15 DIAGNOSIS — F43.9 TRAUMA AND STRESSOR-RELATED DISORDER: ICD-10-CM

## 2022-12-15 DIAGNOSIS — F41.1 GENERALIZED ANXIETY DISORDER: ICD-10-CM

## 2022-12-15 PROCEDURE — 90834 PSYTX W PT 45 MINUTES: CPT | Mod: 95 | Performed by: SOCIAL WORKER

## 2022-12-15 ASSESSMENT — ANXIETY QUESTIONNAIRES
7. FEELING AFRAID AS IF SOMETHING AWFUL MIGHT HAPPEN: NEARLY EVERY DAY
4. TROUBLE RELAXING: NEARLY EVERY DAY
2. NOT BEING ABLE TO STOP OR CONTROL WORRYING: NEARLY EVERY DAY
IF YOU CHECKED OFF ANY PROBLEMS ON THIS QUESTIONNAIRE, HOW DIFFICULT HAVE THESE PROBLEMS MADE IT FOR YOU TO DO YOUR WORK, TAKE CARE OF THINGS AT HOME, OR GET ALONG WITH OTHER PEOPLE: EXTREMELY DIFFICULT
1. FEELING NERVOUS, ANXIOUS, OR ON EDGE: NEARLY EVERY DAY
3. WORRYING TOO MUCH ABOUT DIFFERENT THINGS: NEARLY EVERY DAY
2. NOT BEING ABLE TO STOP OR CONTROL WORRYING: NEARLY EVERY DAY
GAD7 TOTAL SCORE: 19
8. IF YOU CHECKED OFF ANY PROBLEMS, HOW DIFFICULT HAVE THESE MADE IT FOR YOU TO DO YOUR WORK, TAKE CARE OF THINGS AT HOME, OR GET ALONG WITH OTHER PEOPLE?: EXTREMELY DIFFICULT
GAD7 TOTAL SCORE: 19
5. BEING SO RESTLESS THAT IT IS HARD TO SIT STILL: MORE THAN HALF THE DAYS
4. TROUBLE RELAXING: NEARLY EVERY DAY
4. TROUBLE RELAXING: NEARLY EVERY DAY
6. BECOMING EASILY ANNOYED OR IRRITABLE: MORE THAN HALF THE DAYS
7. FEELING AFRAID AS IF SOMETHING AWFUL MIGHT HAPPEN: NEARLY EVERY DAY
1. FEELING NERVOUS, ANXIOUS, OR ON EDGE: NEARLY EVERY DAY
GAD7 TOTAL SCORE: 19
3. WORRYING TOO MUCH ABOUT DIFFERENT THINGS: NEARLY EVERY DAY
7. FEELING AFRAID AS IF SOMETHING AWFUL MIGHT HAPPEN: NEARLY EVERY DAY
6. BECOMING EASILY ANNOYED OR IRRITABLE: MORE THAN HALF THE DAYS
7. FEELING AFRAID AS IF SOMETHING AWFUL MIGHT HAPPEN: NEARLY EVERY DAY
GAD7 TOTAL SCORE: 19
8. IF YOU CHECKED OFF ANY PROBLEMS, HOW DIFFICULT HAVE THESE MADE IT FOR YOU TO DO YOUR WORK, TAKE CARE OF THINGS AT HOME, OR GET ALONG WITH OTHER PEOPLE?: EXTREMELY DIFFICULT
2. NOT BEING ABLE TO STOP OR CONTROL WORRYING: NEARLY EVERY DAY
GAD7 TOTAL SCORE: 19
GAD7 TOTAL SCORE: 19
IF YOU CHECKED OFF ANY PROBLEMS ON THIS QUESTIONNAIRE, HOW DIFFICULT HAVE THESE PROBLEMS MADE IT FOR YOU TO DO YOUR WORK, TAKE CARE OF THINGS AT HOME, OR GET ALONG WITH OTHER PEOPLE: EXTREMELY DIFFICULT
7. FEELING AFRAID AS IF SOMETHING AWFUL MIGHT HAPPEN: NEARLY EVERY DAY
6. BECOMING EASILY ANNOYED OR IRRITABLE: MORE THAN HALF THE DAYS
5. BEING SO RESTLESS THAT IT IS HARD TO SIT STILL: MORE THAN HALF THE DAYS
8. IF YOU CHECKED OFF ANY PROBLEMS, HOW DIFFICULT HAVE THESE MADE IT FOR YOU TO DO YOUR WORK, TAKE CARE OF THINGS AT HOME, OR GET ALONG WITH OTHER PEOPLE?: EXTREMELY DIFFICULT
IF YOU CHECKED OFF ANY PROBLEMS ON THIS QUESTIONNAIRE, HOW DIFFICULT HAVE THESE PROBLEMS MADE IT FOR YOU TO DO YOUR WORK, TAKE CARE OF THINGS AT HOME, OR GET ALONG WITH OTHER PEOPLE: EXTREMELY DIFFICULT
5. BEING SO RESTLESS THAT IT IS HARD TO SIT STILL: MORE THAN HALF THE DAYS
1. FEELING NERVOUS, ANXIOUS, OR ON EDGE: NEARLY EVERY DAY
7. FEELING AFRAID AS IF SOMETHING AWFUL MIGHT HAPPEN: NEARLY EVERY DAY
GAD7 TOTAL SCORE: 19
3. WORRYING TOO MUCH ABOUT DIFFERENT THINGS: NEARLY EVERY DAY

## 2022-12-15 NOTE — PROGRESS NOTES
[facility]                                     Progress Note    Patient Name: Randi Cleary  Date: 12/15/22         Service Type: Individual     Session Start Time: 2:02 PM  Session End Time: 2:47 PM     Session Length: 45 minutes    Session #: 144    Attendees: Client attended alone    Service Modality:  Video Visit:      Provider verified identity through the following two step process.  Patient provided:  Patient is known previously to provider    Telemedicine Visit: The patient's condition can be safely assessed and treated via synchronous audio and visual telemedicine encounter.      Reason for Telemedicine Visit: Patient convenience (e.g. access to timely appointments / distance to available provider)    Originating Site (Patient Location): Patient's home    Distant Site (Provider Location): Provider Remote Setting- Home Office    Consent:  The patient/guardian has verbally consented to: the potential risks and benefits of telemedicine (video visit) versus in person care; bill my insurance or make self-payment for services provided; and responsibility for payment of non-covered services.     Patient would like the video invitation sent by:  My Chart    Mode of Communication:  Video Conference via Amwell    Distant Location (Provider):  Off-site    As the provider I attest to compliance with applicable laws and regulations related to telemedicine.    DATA  Extended Session (53+ minutes): No  Interactive Complexity: No  Crisis: No        Progress Since Last Session (Related to Symptoms / Goals / Homework):   Symptoms: Patient is doing well despite the 2 weeks between sessions    Homework: Progress made  Move towards the bedroom  Reduce soda consumption: 3 a day for the goal  Get to the pool  Journal daily     Episode of Care Goals: Satisfactory progress - ACTION (Actively working towards change); Intervened by reinforcing change plan / affirming steps taken     Current / Ongoing  Stressors and Concerns:   Patient is currently socially isolated. She has a conflictual relationship with her .  She is getting minimal physical activity.  She had recent eye surgery and shoulder surgery.     Treatment Objective(s) Addressed in This Session:   Patient will increase frequency of engaging her in ADLs.  Patient will track and record at least 5 pleasant exchanges with . Patient will be able to identify at least 5 positive traits about her .  Patient will reduce level of depressive and anxious features as evidenced by reduction in score on her CHAVO-7 and PHQ-9 (scores of 15 and 16 at first measurment, respectively).     Intervention:   Solution Focused:   Discussed patient's concerns surrounding finances.  Also talked about some concerns about one of her friends help and how it was impacting her.  Talked about what supports she is using right now, including her .  Also talked about utilizing music for support.      The following assessments were completed by patient for this visit:  PHQ9: of note, patient reported she was in a hurry and didn't read all questions, she reports no suicidal ideation  PHQ-9 SCORE 9/20/2022 10/11/2022 10/13/2022 11/10/2022 11/17/2022 11/29/2022 12/1/2022   PHQ-9 Total Score MyChart 13 (Moderate depression) 13 (Moderate depression) 17 (Moderately severe depression) 16 (Moderately severe depression) 15 (Moderately severe depression) 8 (Mild depression) 16 (Moderately severe depression)   PHQ-9 Total Score 13 13 17 16 15 8 16   Some encounter information is confidential and restricted. Go to Review MICMALI activity to see all data.     GAD7:   CHAVO-7 SCORE 11/10/2022 11/10/2022 11/10/2022 11/10/2022 12/15/2022 12/15/2022 12/15/2022   Total Score - - - 17 (severe anxiety) - - 19 (severe anxiety)   Total Score 17 17 17 17 19 19 19   Some encounter information is confidential and restricted. Go to Review MICMALI activity to see all data.     PROMIS  10-Global Health (only subscores and total score):   PROMIS-10 Scores Only 6/9/2022 9/6/2022 9/6/2022 9/6/2022 12/15/2022 12/15/2022 12/15/2022   Global Mental Health Score 12 7 7 7 6 6 6   Global Physical Health Score 10 9 9 9 7 7 7   PROMIS TOTAL - SUBSCORES 22 16 16 16 13 13 13        ASSESSMENT: Current Emotional / Mental Status (status of significant symptoms):   Risk status (Self / Other harm or suicidal ideation)   Patient denies current fears or concerns for personal safety.   Patient denies current or recent suicidal ideation or behaviors.   Patient denies current or recent homicidal ideation or behaviors.   Patient denies current or recent self injurious behavior or ideation.   Patient denies other safety concerns.   Patient reports there has been no change in risk factors since their last session.     Patient reports there has been no change in protective factors since their last session.     A safety and risk management plan has been developed including: Patient consented to co-developed safety plan on 11/24/2020.  Safety and risk management plan was reviewed.   Patient agreed to use safety plan should any safety concerns arise.  A copy was made available to the patient.     Appearance:   Appropriate    Eye Contact:   Good    Psychomotor Behavior: Normal    Attitude:   Cooperative    Orientation:   All   Speech    Rate / Production: Normal/ Responsive    Volume:  Normal    Mood:    Normal   Affect:    Appropriate    Thought Content:  Clear    Thought Form:  Coherent    Insight:    Good      Medication Review:   No changes to current psychiatric medication(s)     Medication Compliance:   Yes     Changes in Health Issues:   None reported     Chemical Use Review:   Substance Use: Chemical use reviewed, no active concerns identified Nothing used since August 2021.     Tobacco Use: No current tobacco use.      Diagnosis:  1. Bipolar 2 disorder (H)    2. Generalized anxiety disorder    3. Trauma and  stressor-related disorder      Collateral Reports Completed:   Not Applicable    PLAN: (Patient Tasks / Therapist Tasks / Other)  Move towards the bedroom  Reduce soda consumption: 3 a day for the goal  Get to the pool  Journal daily  Ask about the associations with music      There has been demonstrated improvement in functioning while patient has been engaged in psychotherapy/psychological service- if withdrawn the patient would deteriorate and/or relapse.     CRUZ MICAELA BAKER JO   December 15, 2022                                                        ______________________________________________________________________    Individual Treatment Plan    Patient's Name: Randi Cleary  YOB: 1955    Date of Creation: 20  Date Treatment Plan Last Reviewed/Revised: 22    DSM5 Diagnoses: 296.89 Bipolar II Disorder Depressed, 300.02 (F41.1) Generalized Anxiety Disorder or Adjustment Disorders  309.89 (F43.8) Other Specified Trauma and Stressor Related Disorder  Psychosocial / Contextual Factors: Patient's entire family of origin has , she now has a sister-in-law and  as support.  Relationship with  is conflictual. She is recovering from surgeries  PROMIS (reviewed every 90 days): PROMIS-10 Scores  PROMIS 10-Global Health (only subscores and total score):   PROMIS-10 Scores Only 2021 3/15/2022 3/15/2022 3/15/2022 3/24/2022 2022 2022   Global Mental Health Score 6 6 6 6 8 12 12   Global Physical Health Score 9 9 9 9 8 11 10   PROMIS TOTAL - SUBSCORES 15 15 15 15 16 23 22       Referral / Collaboration:  Referral to another professional/service is not indicated at this time..    Anticipated number of session for this episode of care: 50  Anticipation frequency of session: Biweekly  Anticipated Duration of each session: 53 or more minutes due to intensity of trauma symptoms  Treatment plan will be reviewed in 90 days or when goals have been changed.   There has  "been demonstrated improvement in functioning while patient has been engaged in psychotherapy/psychological service- if withdrawn the patient would deteriorate and/or relapse.       MeasurableTreatment Goal(s) related to diagnosis / functional impairment(s)  Goal 1: Patient will \"jumpstart, getting going with the things I need to be doing around the house as far as picking up, doing things, trying to do something every day.  Also to lessen the animosity between me and my .\"    I will know I've met my goal when my shoulders are fixed and I can see.      Objective #A (Patient Action)    Patient will increase frequency of engaging her in ADLs.  Status: Continued - Date(s): 12/10/21, 3/9/22, 6/9/22, 9/2/22, 12/1/22    Intervention(s)  Therapist will engage patient in CBT, specifically behavioral activation.    Objective #B  Patient will  track and record at least 5 pleasant exchanges with . Patient will be able to identify at least 5 positive traits about her  and how he relates to her.  Status: Continued - Date(s): 12/10/21, 3/9/22, 6/9/22, 9/2/22, 12/1/22    Intervention(s)  Therapist will teach assertiveness skills and assign homework related to relationship interactions.    Objective #C  Patient will reduce level of depressive and anxious features as evidenced by reduction in score on her CHAVO-7 and PHQ-9 (scores of 15 and 16 at first measurment, respectively).  Status: Continued - Date(s): 12/10/21, 3/9/22, 6/9/22, 9/2/22, 12/1/22    Intervention(s)  Therapist will engage patient in person-centered therapy and CBT.    Patient has reviewed and agreed to the above plan.      MICAELA SLADE  December 1, 2022                                                   Randi Cleary          SAFETY PLAN:  Step 1: Warning signs / cues (Thoughts, images, mood, situation, behavior) that a crisis may be developing:  ? Thoughts: \"I don't want to continue\" \"I am unwanted\"  ? Images: none  ? Thinking " "Processes: ruminating  ? Mood: anger  ? Behaviors: isolating/withdrawing , can't stop crying, not taking care of myself and not taking care of my responsibilities  ? Situations: small triggers, such as not being able to find something, or dropping something   Step 2: Coping strategies - Things I can do to take my mind off of my problems without contacting another person (relaxation technique, physical activity):  ? Distress Tolerance Strategies:  arts and crafts: drawing, play with my pet , listen to positive and upbeat music: any, change body temperature (ice pack/cold water)  and paced breathing/progressive muscle relaxation  ? Physical Activities: go for a walk, deep breathing and stretching   ? Focus on helpful thoughts:  \"You've been through this before, you can get through it again.\"  Step 3: People and social settings that provide distraction:                 Name: Carmen                            Name: Darien                           Name: Aleida       ? pool, shopping, Carmen's house, [employer]       Step 4: Remind myself of people and things that are important to me and worth living for:  Clifford Little Donna, post-COVID world, options of what could be in your future        Step 5: When I am in crisis, I can ask these people to help me use my safety plan:                 Name: Sidney  Step 6: Making the environment safe:   ? go to sleep/daydream  Step 7: Professionals or agencies I can contact during a crisis:  ? [facility] Daytime Number: [phone]  ? Suicide Prevention Lifeline: 3-919-907-UIRK (7549)  ? Crisis Text Line Service (available 24 hours a day, 7 days a week): Text [place] to 102164    Local Crisis Services: [facility] Crisis: [phone]     Call 911 or go to my nearest emergency department.       I helped develop this safety plan and agree to use it when needed.  I have been given a copy of this plan.       Client " signature _________________________________________________________________  Today s date:  11/24/2020  Adapted from Safety Plan Template 2008 Randi Poole and Robby Barba is reprinted with the express permission of the authors.  No portion of the Safety Plan Template may be reproduced without the express, written permission.  You can contact the authors at [email or web address] or [email or web address].

## 2022-12-16 NOTE — PROGRESS NOTES
The ABCs of the Annual Wellness Visit  Subsequent Medicare Wellness Visit    Subjective      Melody Guzman is a 69 y.o. female who presents for a Subsequent Medicare Wellness Visit.    Recent rash, evaluated by Dr. Contreras, on last day of steroid taper, states it was hives and unknown contributing factors, but possible red dye  Marcus less man cholesticare, she is interested in starting this supplement to see if she can lower her cholesterol vs traditional statin therapy. She was prescribed this at her last visit but states she never started the medication.    The following portions of the patient's history were reviewed and   updated as appropriate: allergies, current medications, past family history, past medical history, past social history, past surgical history and problem list.    Compared to one year ago, the patient feels her physical   health is the same.    Compared to one year ago, the patient feels her mental   health is the same.    Recent Hospitalizations:  She was not admitted to the hospital during the last year.       Current Medical Providers:  Patient Care Team:  Alesha Cherry APRN as PCP - General (Family Medicine)    Outpatient Medications Prior to Visit   Medication Sig Dispense Refill   • hydrOXYzine (ATARAX) 25 MG tablet Take 1 tablet by mouth 3 (Three) Times a Day As Needed for Itching. 40 tablet 2   • ketoconazole (NIZORAL) 2 % cream by Intratympanic route.     • triamcinolone (KENALOG) 0.025 % cream Apply  topically to the appropriate area as directed 3 (Three) Times a Day. 30 g 11   • atenolol (TENORMIN) 50 MG tablet TAKE (1) TABLET DAILY 30 tablet 5   • meclizine (ANTIVERT) 25 MG tablet Take 1 tablet by mouth 3 (Three) Times a Day As Needed for Dizziness. 60 tablet 5   • olopatadine (PATANOL) 0.1 % ophthalmic solution Administer 1 drop to both eyes 2 (Two) Times a Day. 5 mL 3   • promethazine-dextromethorphan (PROMETHAZINE-DM) 6.25-15 MG/5ML syrup Take 5 mL by mouth 4  This is a followup visit for a diagnosis of hereditary hemochromatosis.       INTERVAL HISTORY: Patient has had rough past few months since she last saw me.  She was admitted to the hospital for serotonin syndrome in was discharged with outpatient medical management.  She is still struggling to get a good handle on her mental health and was teary throughout the encounter.  She is working with her primary care physician.   She also has ongoing issues with her thyroid and is trying to work with endocrinology clinic.   She states that otherwise her health is okay but her shoulder surgery and colonoscopy were deferred due to her complicated clinical course from serotonin syndrome.   Denies any fevers chills coughing or breathing trouble at this point.   Rest 10 point review of system was negative  MEDICAL HISTORY, SURGICAL HISTORY:  Changes as mentioned above.      FAMILY/SOCIAL HISTORY:  Detailed above.        ALLERGIES/MEDICATIONS: Reviewed.      PHYSICAL EXAMINATION: This was a video visit.  Patient was moving around in her house  Seemed alert oriented x3.  Very anxious and depressed crying throughout the encounter.  Very thankful for the care that we provide but just very overwhelmed with the current social situation with the pandemic as well as other related issues at her house.     LABORATORY:   Results for GEETA BOSS (MRN [id number]) as of 12/4/2020 12:44   Ref. Range 10/30/2020 14:05   Sodium Latest Ref Range: 133 - 144 mmol/L 140   Potassium Latest Ref Range: 3.4 - 5.3 mmol/L 4.0   Chloride Latest Ref Range: 94 - 109 mmol/L 107   Carbon Dioxide Latest Ref Range: 20 - 32 mmol/L 26   Urea Nitrogen Latest Ref Range: 7 - 30 mg/dL 11   Creatinine Latest Ref Range: 0.52 - 1.04 mg/dL 0.63   GFR Estimate Latest Ref Range: >60 mL/min/1.73_m2 >90   GFR Estimate If Black Latest Ref Range: >60 mL/min/1.73_m2 >90   Calcium Latest Ref Range: 8.5 - 10.1 mg/dL 9.8   Anion Gap Latest Ref Range: 3 - 14 mmol/L 7    Hemoglobin A1C Latest Ref Range: 0 - 5.6 % 6.1 (H)   Ferritin Latest Ref Range: 8 - 252 ng/mL 20   Iron Latest Ref Range: 35 - 180 ug/dL 52   Iron Binding Cap Latest Ref Range: 240 - 430 ug/dL 334   Iron Saturation Index Latest Ref Range: 15 - 46 % 15   Transferrin Latest Ref Range: 210 - 360 mg/dL 253   TSH Latest Ref Range: 0.40 - 4.00 mU/L 0.37 (L)   Glucose Latest Ref Range: 70 - 99 mg/dL 136 (H)   WBC Latest Ref Range: 4.0 - 11.0 10e9/L 7.1   Hemoglobin Latest Ref Range: 11.7 - 15.7 g/dL 15.4   Hematocrit Latest Ref Range: 35.0 - 47.0 % 47.5 (H)   Platelet Count Latest Ref Range: 150 - 450 10e9/L 228   RBC Count Latest Ref Range: 3.8 - 5.2 10e12/L 5.29 (H)   MCV Latest Ref Range: 78 - 100 fl 90   MCH Latest Ref Range: 26.5 - 33.0 pg 29.1   MCHC Latest Ref Range: 31.5 - 36.5 g/dL 32.4   RDW Latest Ref Range: 10.0 - 15.0 % 14.6   Diff Method Unknown Automated Method   % Neutrophils Latest Units: % 75.9   % Lymphocytes Latest Units: % 16.5   % Monocytes Latest Units: % 5.5   % Eosinophils Latest Units: % 1.4   % Basophils Latest Units: % 0.4   % Immature Granulocytes Latest Units: % 0.3   Nucleated RBCs Latest Ref Range: 0 /100 0   Absolute Neutrophil Latest Ref Range: 1.6 - 8.3 10e9/L 5.4   Absolute Lymphocytes Latest Ref Range: 0.8 - 5.3 10e9/L 1.2   Absolute Monocytes Latest Ref Range: 0.0 - 1.3 10e9/L 0.4   Absolute Eosinophils Latest Ref Range: 0.0 - 0.7 10e9/L 0.1   Absolute Basophils Latest Ref Range: 0.0 - 0.2 10e9/L 0.0   Abs Immature Granulocytes Latest Ref Range: 0 - 0.4 10e9/L 0.0   Absolute Nucleated RBC Unknown 0.0       Liver MRI:     Hematochromatosis     TECHNIQUE:     Sequential non-contrast spin-echo MRI imaging obtained of the liver  with TR 2500 msec and progressively longer Yoly up to 18 msec.     FINDINGS:     Average liver iron concentration is 0.7 mg/g dry tissue (normal = 0.17  - 1.8)  Average liver iron concentration is 12 mmol/kg dry tissue (normal = 3  - 33)     Other findings:  Small  "(Four) Times a Day As Needed for Cough. 240 mL 0   • nystatin 779875 UNIT/GM powder APPLY TOPICALLY 4 TIMES DAILY TO AFFECTED AREA(S)     • predniSONE (DELTASONE) 10 MG tablet take 4 tablets by mouth in the morning x7 days then 2 in the morning x7 days     • atorvastatin (LIPITOR) 20 MG tablet Take 1 tablet by mouth Daily. 90 tablet 0   • fluconazole (DIFLUCAN) 150 MG tablet Take one tab po at onset of symptoms, repeat in 4 days x 2 3 tablet 0     No facility-administered medications prior to visit.       No opioid medication identified on active medication list. I have reviewed chart for other potential  high risk medication/s and harmful drug interactions in the elderly.          Aspirin is not on active medication list.  Aspirin use is not indicated based on review of current medical condition/s. Risk of harm outweighs potential benefits.  .    Patient Active Problem List   Diagnosis   • Atopic rhinitis   • Gout   • Hypertension   • Other specified noninflammatory disorders of vulva and perineum   • Malignant neoplasm of endometrium (HCC)   • Morbid (severe) obesity due to excess calories (HCC)   • Vertigo   • Allergic conjunctivitis of both eyes   • Pure hypercholesterolemia   • Right knee meniscal tear     Advance Care Planning  Advance Directive is not on file.  ACP discussion was held with the patient during this visit. Patient does not have an advance directive, information provided.     Objective    Vitals:    12/16/22 1317   BP: 138/84   BP Location: Right arm   Patient Position: Sitting   Cuff Size: Adult   Pulse: 68   Resp: 18   Temp: 97 °F (36.1 °C)   TempSrc: Temporal   SpO2: 97%   Weight: 105 kg (231 lb 11.2 oz)   Height: 157.5 cm (62.01\")     Estimated body mass index is 42.37 kg/m² as calculated from the following:    Height as of this encounter: 157.5 cm (62.01\").    Weight as of this encounter: 105 kg (231 lb 11.2 oz).    Class 3 Severe Obesity (BMI >=40). Obesity-related health conditions include " hiatal hernia.                                                                        IMPRESSION:  Liver Iron concentration is within normal limits.     ASSESSMENT/PLAN:  Patient with a history of hereditary hemochromatosis who had undergone phlebotomy in the past.  Has not been phlebotomized at all in 2020.  Comes in for followup.  Her liver MRI does not show any evidence of iron overload.  Her hemoglobin continues to drift down a little bit and I am concerned that she may have been losing it in the GI tract as she is postmenopausal.  Her colonoscopy has been deferred due to her mental health issues at this point I do think that this is becoming more pressing issue and patient will call to coordinate this colonoscopy with the help of my staff.  For follow-up with me I will see her back in 3 months.     Regarding her mental health issues and thyroid issues: I reviewed her thyroid labs with her but defer the interpretation and further management to her endocrinologist.   I have sent a message to her endocrinologist and try to reach out to her primary care through in basket but unfortunately given the fact that she is outside of her system I was unable to do so.   Patient is going to be reaching out to her primary care to get a better coordination of care as she is quite concerned that she is needing to be the quarterback for her complicated medical issues and she does not feel she is a mental state to so appropriately for a good advocate for her.         Sara Hill   of Medicine   Hematology and medical Oncology   [facility]         the following: hypertension. Obesity is unchanged. BMI is is above average; BMI management plan is completed. We discussed portion control and increasing exercise.      Does the patient have evidence of cognitive impairment?   No            HEALTH RISK ASSESSMENT    Smoking Status:  Social History     Tobacco Use   Smoking Status Never   Smokeless Tobacco Never     Alcohol Consumption:  Social History     Substance and Sexual Activity   Alcohol Use No     Fall Risk Screen:    DUANE Fall Risk Assessment was completed, and patient is at LOW risk for falls.Assessment completed on:12/16/2022    Depression Screening:  PHQ-2/PHQ-9 Depression Screening 12/16/2022   Retired PHQ-9 Total Score -   Retired Total Score -   Little Interest or Pleasure in Doing Things 0-->not at all   Feeling Down, Depressed or Hopeless 0-->not at all   PHQ-9: Brief Depression Severity Measure Score 0       Health Habits and Functional and Cognitive Screening:  Functional & Cognitive Status 12/16/2022   Do you have difficulty preparing food and eating? No   Do you have difficulty bathing yourself, getting dressed or grooming yourself? No   Do you have difficulty using the toilet? No   Do you have difficulty moving around from place to place? No   Do you have trouble with steps or getting out of a bed or a chair? No   Current Diet Well Balanced Diet   Dental Exam Not up to date   Eye Exam Up to date   Exercise (times per week) 3 times per week   Current Exercises Include Walking   Current Exercise Activities Include -   Do you need help using the phone?  No   Are you deaf or do you have serious difficulty hearing?  No   Do you need help with transportation? No   Do you need help shopping? No   Do you need help preparing meals?  No   Do you need help with housework?  No   Do you need help with laundry? No   Do you need help taking your medications? No   Do you need help managing money? No   Do you ever drive or ride in a car without wearing a seat  belt? No   Have you felt unusual stress, anger or loneliness in the last month? No   Who do you live with? Alone   If you need help, do you have trouble finding someone available to you? No   Have you been bothered in the last four weeks by sexual problems? No   Do you have difficulty concentrating, remembering or making decisions? No       Age-appropriate Screening Schedule:  Refer to the list below for future screening recommendations based on patient's age, sex and/or medical conditions. Orders for these recommended tests are listed in the plan section. The patient has been provided with a written plan.    Health Maintenance   Topic Date Due   • ZOSTER VACCINE (2 of 3) 12/10/2013   • DXA SCAN  10/03/2016   • LIPID PANEL  12/14/2022   • PAP SMEAR  12/08/2023   • MAMMOGRAM  11/10/2024   • TDAP/TD VACCINES (4 - Td or Tdap) 08/10/2025   • INFLUENZA VACCINE  Completed                CMS Preventative Services Quick Reference  Risk Factors Identified During Encounter:    Immunizations Discussed/Encouraged: Shingrix    The above risks/problems have been discussed with the patient.  Pertinent information has been shared with the patient in the After Visit Summary.    Diagnoses and all orders for this visit:    1. Medicare annual wellness visit, subsequent (Primary)  -     CBC & Differential  -     Comprehensive Metabolic Panel  -     Lipid Panel With LDL / HDL Ratio  -     TSH Rfx On Abnormal To Free T4    2. Essential hypertension  -     atenolol (TENORMIN) 50 MG tablet; Take 1 tablet by mouth Daily.  Dispense: 30 tablet; Refill: 11    3. Screening for lipoid disorders  -     Lipid Panel With LDL / HDL Ratio    4. Screening for thyroid disorder  -     TSH Rfx On Abnormal To Free T4    5. Allergic conjunctivitis of both eyes  -     olopatadine (PATANOL) 0.1 % ophthalmic solution; Administer 1 drop to both eyes 2 (Two) Times a Day.  Dispense: 5 mL; Refill: 11    6. Dizziness  -     meclizine (ANTIVERT) 25 MG tablet; Take 1  tablet by mouth 3 (Three) Times a Day As Needed for Dizziness.  Dispense: 60 tablet; Refill: 5    7. Postmenopausal  -     DEXA Bone Density Axial    Other orders  -     promethazine-dextromethorphan (PROMETHAZINE-DM) 6.25-15 MG/5ML syrup; Take 5 mL by mouth 4 (Four) Times a Day As Needed for Cough.  Dispense: 240 mL; Refill: 0    hyperlipidemia: discussed medication, she is hesitant to start statin therapy, she is going to start cholesterol health to lower her levels and follow up with labs in future  Routine labs in future  Refills given  Patient with intermittent cough, previously prescribed promethazine DM, ok to refill    Follow Up:   Next Medicare Wellness visit to be scheduled in 1 year.      An After Visit Summary and PPPS were made available to the patient.

## 2022-12-19 ENCOUNTER — VIRTUAL VISIT (OUTPATIENT)
Dept: PSYCHOLOGY | Facility: CLINIC | Age: 67
End: 2022-12-19
Payer: MEDICARE

## 2022-12-19 DIAGNOSIS — F41.1 GENERALIZED ANXIETY DISORDER: ICD-10-CM

## 2022-12-19 DIAGNOSIS — F43.9 TRAUMA AND STRESSOR-RELATED DISORDER: ICD-10-CM

## 2022-12-19 DIAGNOSIS — F31.81 BIPOLAR 2 DISORDER (H): Primary | ICD-10-CM

## 2022-12-19 PROCEDURE — 90837 PSYTX W PT 60 MINUTES: CPT | Mod: 95 | Performed by: SOCIAL WORKER

## 2022-12-19 NOTE — PROGRESS NOTES
"[facility]                                     Progress Note    Patient Name: Randi Cleary  Date: 12/19/22         Service Type: Individual     Session Start Time: 2:00 PM  Session End Time: 2:53 PM     Session Length: 53 minutes    Session #: 145    Attendees: Client attended alone    Service Modality:  Video Visit:      Provider verified identity through the following two step process.  Patient provided:  Patient is known previously to provider    Telemedicine Visit: The patient's condition can be safely assessed and treated via synchronous audio and visual telemedicine encounter.      Reason for Telemedicine Visit: Patient convenience (e.g. access to timely appointments / distance to available provider)    Originating Site (Patient Location): Patient's home    Distant Site (Provider Location): Provider Remote Setting- Home Office    Consent:  The patient/guardian has verbally consented to: the potential risks and benefits of telemedicine (video visit) versus in person care; bill my insurance or make self-payment for services provided; and responsibility for payment of non-covered services.     Patient would like the video invitation sent by:  My Chart    Mode of Communication:  Video Conference via [employer]well    Distant Location (Provider):  Off-site    As the provider I attest to compliance with applicable laws and regulations related to telemedicine.    DATA  Extended Session (53+ minutes): No  Interactive Complexity: No  Crisis: No        Progress Since Last Session (Related to Symptoms / Goals / Homework):   Symptoms: Patient reports ongoing distress and concerned that \"this is the best it is going to be\"    Homework: Progress made  Move towards the bedroom -not done  Reduce soda consumption: 3 a day for the goal -3-4 a day right now  Get to the pool  Journal daily  Ask about the associations with music     Episode of Care Goals: Satisfactory progress - ACTION (Actively working towards " change); Intervened by reinforcing change plan / affirming steps taken     Current / Ongoing Stressors and Concerns:   Patient is currently socially isolated. She has a conflictual relationship with her .  She is getting minimal physical activity.  She had recent eye surgery and shoulder surgery.     Treatment Objective(s) Addressed in This Session:   Patient will increase frequency of engaging her in ADLs.  Patient will track and record at least 5 pleasant exchanges with . Patient will be able to identify at least 5 positive traits about her .  Patient will reduce level of depressive and anxious features as evidenced by reduction in score on her CHAVO-7 and PHQ-9 (scores of 15 and 16 at first measurment, respectively).     Intervention:   Solution Focused:   Processed patient's concerns recently and how she is coping with them.  Talked about her fears that things will get better, and identified that she is ready to take the next step of pursuing day treatment.  Also looked at other things she has been exploring to take care of herself.  Reviewed homework and identified successes and barriers to completion.  Set updated goals.        The following assessments were completed by patient for this visit:  PHQ9: of note, patient reported she was in a hurry and didn't read all questions, she reports no suicidal ideation  PHQ-9 SCORE 10/11/2022 10/13/2022 11/10/2022 11/17/2022 11/29/2022 12/1/2022 12/19/2022   PHQ-9 Total Score MyChart 13 (Moderate depression) 17 (Moderately severe depression) 16 (Moderately severe depression) 15 (Moderately severe depression) 8 (Mild depression) 16 (Moderately severe depression) 15 (Moderately severe depression)   PHQ-9 Total Score 13 17 16 15 8 16 15   Some encounter information is confidential and restricted. Go to Review Flowsheets activity to see all data.     GAD7:   CHAVO-7 SCORE 11/10/2022 11/10/2022 11/10/2022 11/10/2022 12/15/2022 12/15/2022 12/15/2022   Total  Score - - - 17 (severe anxiety) - - 19 (severe anxiety)   Total Score 17 17 17 17 19 19 19   Some encounter information is confidential and restricted. Go to Review Flowsheets activity to see all data.     PROMIS 10-Global Health (only subscores and total score):   PROMIS-10 Scores Only 6/9/2022 9/6/2022 9/6/2022 9/6/2022 12/15/2022 12/15/2022 12/15/2022   Global Mental Health Score 12 7 7 7 6 6 6   Global Physical Health Score 10 9 9 9 7 7 7   PROMIS TOTAL - SUBSCORES 22 16 16 16 13 13 13        ASSESSMENT: Current Emotional / Mental Status (status of significant symptoms):   Risk status (Self / Other harm or suicidal ideation)   Patient denies current fears or concerns for personal safety.   Patient denies current or recent suicidal ideation or behaviors.   Patient denies current or recent homicidal ideation or behaviors.   Patient denies current or recent self injurious behavior or ideation.   Patient denies other safety concerns.   Patient reports there has been no change in risk factors since their last session.     Patient reports there has been no change in protective factors since their last session.     A safety and risk management plan has been developed including: Patient consented to co-developed safety plan on 11/24/2020.  Safety and risk management plan was reviewed.   Patient agreed to use safety plan should any safety concerns arise.  A copy was made available to the patient.     Appearance:   Appropriate    Eye Contact:   Good    Psychomotor Behavior: Normal    Attitude:   Cooperative    Orientation:   All   Speech    Rate / Production: Normal/ Responsive    Volume:  Normal    Mood:    Normal   Affect:    Appropriate    Thought Content:  Clear    Thought Form:  Coherent    Insight:    Good      Medication Review:   No changes to current psychiatric medication(s)     Medication Compliance:   Yes     Changes in Health Issues:   None reported     Chemical Use Review:   Substance Use: Chemical use  reviewed, no active concerns identified Nothing used since 2021.     Tobacco Use: No current tobacco use.      Diagnosis:  1. Bipolar 2 disorder (H)    2. Generalized anxiety disorder    3. Trauma and stressor-related disorder      Collateral Reports Completed:   Not Applicable    PLAN: (Patient Tasks / Therapist Tasks / Other)  Move towards the bedroom  Reduce soda consumption: 3 a day for the goal  Get to the pool  Journal daily  Ask about the associations with music  Call to start day treatment  Start to clear out storage locker  Work on budget      There has been demonstrated improvement in functioning while patient has been engaged in psychotherapy/psychological service- if withdrawn the patient would deteriorate and/or relapse.     MICAELA SLADE   2022                                                        ______________________________________________________________________    Individual Treatment Plan    Patient's Name: Randi Cleary  YOB: 1955    Date of Creation: 20  Date Treatment Plan Last Reviewed/Revised: 22    DSM5 Diagnoses: 296.89 Bipolar II Disorder Depressed, 300.02 (F41.1) Generalized Anxiety Disorder or Adjustment Disorders  309.89 (F43.8) Other Specified Trauma and Stressor Related Disorder  Psychosocial / Contextual Factors: Patient's entire family of origin has , she now has a sister-in-law and  as support.  Relationship with  is conflictual. She is recovering from surgeries  PROMIS (reviewed every 90 days): PROMIS-10 Scores  PROMIS 10-Global Health (only subscores and total score):   PROMIS-10 Scores Only 2021 3/15/2022 3/15/2022 3/15/2022 3/24/2022 2022 2022   Global Mental Health Score 6 6 6 6 8 12 12   Global Physical Health Score 9 9 9 9 8 11 10   PROMIS TOTAL - SUBSCORES 15 15 15 15 16 23 22       Referral / Collaboration:  Referral to another professional/service is not indicated at this  "time..    Anticipated number of session for this episode of care: 50  Anticipation frequency of session: Biweekly  Anticipated Duration of each session: 53 or more minutes due to intensity of trauma symptoms  Treatment plan will be reviewed in 90 days or when goals have been changed.   There has been demonstrated improvement in functioning while patient has been engaged in psychotherapy/psychological service- if withdrawn the patient would deteriorate and/or relapse.       MeasurableTreatment Goal(s) related to diagnosis / functional impairment(s)  Goal 1: Patient will \"jumpstart, getting going with the things I need to be doing around the house as far as picking up, doing things, trying to do something every day.  Also to lessen the animosity between me and my .\"    I will know I've met my goal when my shoulders are fixed and I can see.      Objective #A (Patient Action)    Patient will increase frequency of engaging her in ADLs.  Status: Continued - Date(s): 12/10/21, 3/9/22, 6/9/22, 9/2/22, 12/1/22    Intervention(s)  Therapist will engage patient in CBT, specifically behavioral activation.    Objective #B  Patient will  track and record at least 5 pleasant exchanges with . Patient will be able to identify at least 5 positive traits about her  and how he relates to her.  Status: Continued - Date(s): 12/10/21, 3/9/22, 6/9/22, 9/2/22, 12/1/22    Intervention(s)  Therapist will teach assertiveness skills and assign homework related to relationship interactions.    Objective #C  Patient will reduce level of depressive and anxious features as evidenced by reduction in score on her CHAVO-7 and PHQ-9 (scores of 15 and 16 at first measurment, respectively).  Status: Continued - Date(s): 12/10/21, 3/9/22, 6/9/22, 9/2/22, 12/1/22    Intervention(s)  Therapist will engage patient in person-centered therapy and CBT.    Patient has reviewed and agreed to the above plan.      MICAELA SLADE  December 1, " "2022                                                   Randi Cleary          SAFETY PLAN:  Step 1: Warning signs / cues (Thoughts, images, mood, situation, behavior) that a crisis may be developing:  ? Thoughts: \"I don't want to continue\" \"I am unwanted\"  ? Images: none  ? Thinking Processes: ruminating  ? Mood: anger  ? Behaviors: isolating/withdrawing , can't stop crying, not taking care of myself and not taking care of my responsibilities  ? Situations: small triggers, such as not being able to find something, or dropping something   Step 2: Coping strategies - Things I can do to take my mind off of my problems without contacting another person (relaxation technique, physical activity):  ? Distress Tolerance Strategies:  arts and crafts: drawing, play with my pet , listen to positive and upbeat music: any, change body temperature (ice pack/cold water)  and paced breathing/progressive muscle relaxation  ? Physical Activities: go for a walk, deep breathing and stretching   ? Focus on helpful thoughts:  \"You've been through this before, you can get through it again.\"  Step 3: People and social settings that provide distraction:                 Name: Carmen                            Name: Darien                           Name: Aleida       ? pool, shopping, Carmen's house, [employer]       Step 4: Remind myself of people and things that are important to me and worth living for:  Clifford Little Donna, post-COVID world, options of what could be in your future        Step 5: When I am in crisis, I can ask these people to help me use my safety plan:                 Name: Sidney  Step 6: Making the environment safe:   ? go to sleep/daydream  Step 7: Professionals or agencies I can contact during a crisis:  ? [facility] Daytime Number: [phone]  ? Suicide Prevention Lifeline: 3-791-108-TEZO (1977)  ? Crisis Text Line Service (available 24 hours a day, 7 days a week): Text [place] to 895764    Local Crisis " Services: [facility] Crisis: [phone]     Call 911 or go to my nearest emergency department.       I helped develop this safety plan and agree to use it when needed.  I have been given a copy of this plan.       Client signature _________________________________________________________________  Today s date:  11/24/2020  Adapted from Safety Plan Template 2008 Randi Poole and Robby Barba is reprinted with the express permission of the authors.  No portion of the Safety Plan Template may be reproduced without the express, written permission.  You can contact the authors at [email or web address] or [email or web address].

## 2022-12-21 ENCOUNTER — VIRTUAL VISIT (OUTPATIENT)
Dept: PSYCHOLOGY | Facility: CLINIC | Age: 67
End: 2022-12-21
Payer: MEDICARE

## 2022-12-21 ENCOUNTER — MEDICAL CORRESPONDENCE (OUTPATIENT)
Dept: NEUROSURGERY | Facility: CLINIC | Age: 67
End: 2022-12-21

## 2022-12-21 DIAGNOSIS — F41.1 GENERALIZED ANXIETY DISORDER: ICD-10-CM

## 2022-12-21 DIAGNOSIS — F43.9 TRAUMA AND STRESSOR-RELATED DISORDER: ICD-10-CM

## 2022-12-21 DIAGNOSIS — F31.81 BIPOLAR 2 DISORDER (H): Primary | ICD-10-CM

## 2022-12-21 PROCEDURE — 90837 PSYTX W PT 60 MINUTES: CPT | Mod: 95 | Performed by: SOCIAL WORKER

## 2022-12-21 NOTE — PROGRESS NOTES
[facility]                                     Progress Note    Patient Name: Randi Cleary  Date: 12/21/22         Service Type: Individual     Session Start Time: 12:05 PM  Session End Time: 12:58 PM     Session Length: 53 minutes    Session #: 146    Attendees: Client attended alone    Service Modality:  Video Visit:      Provider verified identity through the following two step process.  Patient provided:  Patient is known previously to provider    Telemedicine Visit: The patient's condition can be safely assessed and treated via synchronous audio and visual telemedicine encounter.      Reason for Telemedicine Visit: Patient convenience (e.g. access to timely appointments / distance to available provider)    Originating Site (Patient Location): Patient's home    Distant Site (Provider Location): Provider Remote Setting- Home Office    Consent:  The patient/guardian has verbally consented to: the potential risks and benefits of telemedicine (video visit) versus in person care; bill my insurance or make self-payment for services provided; and responsibility for payment of non-covered services.     Patient would like the video invitation sent by:  My Chart    Mode of Communication:  [facility]    Distant Location (Provider):  Off-site    As the provider I attest to compliance with applicable laws and regulations related to telemedicine.    DATA  Extended Session (53+ minutes): PROLONGED SERVICE IN THE OUTPATIENT SETTING REQUIRING DIRECT (FACE-TO-FACE) PATIENT CONTACT BEYOND THE USUAL SERVICE:    - Patient's presenting concerns require more intensive intervention than could be completed within the usual service  Interactive Complexity: No  Crisis: No        Progress Since Last Session (Related to Symptoms / Goals / Homework):   Symptoms: Patient is still struggling, but is coming up with a direction to potentally move    Homework: Progress made  Move towards the  bedroom  Reduce soda consumption: 3 a day for the goal  Get to the pool  Journal daily  Ask about the associations with music  Call to start day treatment  Start to clear out storage locker  Work on budget     Episode of Care Goals: Satisfactory progress - ACTION (Actively working towards change); Intervened by reinforcing change plan / affirming steps taken     Current / Ongoing Stressors and Concerns:   Patient is currently socially isolated. She has a conflictual relationship with her .  She is getting minimal physical activity.  She had recent eye surgery and shoulder surgery.     Treatment Objective(s) Addressed in This Session:   Patient will increase frequency of engaging her in ADLs.  Patient will track and record at least 5 pleasant exchanges with . Patient will be able to identify at least 5 positive traits about her .  Patient will reduce level of depressive and anxious features as evidenced by reduction in score on her CHAVO-7 and PHQ-9 (scores of 15 and 16 at first measurment, respectively).     Intervention:   Solution Focused:   Discussed frustration with  and how to shift this focus. Patient wants to get off the controlled substance/pain pills due to all the problems with refilling it. Encouraged patient to discuss this with her pain doctor. Talked about increase in pain pill use and her concerns about this. Talked about what she may do next with this.Patient is setting a deadline for when she is returning to the pool: the week after An- twice a week. Talked about how she hopes to accomplish this and why it is important to her.      The following assessments were completed by patient for this visit:  PHQ9: of note, patient reported she was in a hurry and didn't read all questions, she reports no suicidal ideation  PHQ-9 SCORE 10/13/2022 11/10/2022 11/17/2022 11/29/2022 12/1/2022 12/19/2022 12/21/2022   PHQ-9 Total Score MyChart 17 (Moderately severe depression) 16  (Moderately severe depression) 15 (Moderately severe depression) 8 (Mild depression) 16 (Moderately severe depression) 15 (Moderately severe depression) 15 (Moderately severe depression)   PHQ-9 Total Score 17 16 15 8 16 15 15   Some encounter information is confidential and restricted. Go to Review Flowsheets activity to see all data.     GAD7:   CHAVO-7 SCORE 11/10/2022 11/10/2022 11/10/2022 11/10/2022 12/15/2022 12/15/2022 12/15/2022   Total Score - - - 17 (severe anxiety) - - 19 (severe anxiety)   Total Score 17 17 17 17 19 19 19   Some encounter information is confidential and restricted. Go to Review Flowsheets activity to see all data.     PROMIS 10-Global Health (only subscores and total score):   PROMIS-10 Scores Only 6/9/2022 9/6/2022 9/6/2022 9/6/2022 12/15/2022 12/15/2022 12/15/2022   Global Mental Health Score 12 7 7 7 6 6 6   Global Physical Health Score 10 9 9 9 7 7 7   PROMIS TOTAL - SUBSCORES 22 16 16 16 13 13 13        ASSESSMENT: Current Emotional / Mental Status (status of significant symptoms):   Risk status (Self / Other harm or suicidal ideation)   Patient denies current fears or concerns for personal safety.   Patient denies current or recent suicidal ideation or behaviors.   Patient denies current or recent homicidal ideation or behaviors.   Patient denies current or recent self injurious behavior or ideation.   Patient denies other safety concerns.   Patient reports there has been no change in risk factors since their last session.     Patient reports there has been no change in protective factors since their last session.     A safety and risk management plan has been developed including: Patient consented to co-developed safety plan on 11/24/2020.  Safety and risk management plan was reviewed.   Patient agreed to use safety plan should any safety concerns arise.  A copy was made available to the patient.     Appearance:   Appropriate    Eye Contact:   Good    Psychomotor Behavior: Normal     Attitude:   Cooperative    Orientation:   All   Speech    Rate / Production: Normal/ Responsive    Volume:  Normal    Mood:    Normal   Affect:    Appropriate    Thought Content:  Clear    Thought Form:  Coherent    Insight:    Good      Medication Review:   No changes to current psychiatric medication(s)     Medication Compliance:   Yes     Changes in Health Issues:   None reported     Chemical Use Review:   Substance Use: Chemical use reviewed, no active concerns identified Nothing used since 2021.     Tobacco Use: No current tobacco use.      Diagnosis:  1. Bipolar 2 disorder (H)    2. Generalized anxiety disorder    3. Trauma and stressor-related disorder      Collateral Reports Completed:   Not Applicable    PLAN: (Patient Tasks / Therapist Tasks / Other)  Move towards the bedroom  Reduce soda consumption: 3 a day for the goal  Get to the pool- twice in the week between  and New Years  Journal daily  Call to start day treatment  Start to clear out storage locker  Work on budget      There has been demonstrated improvement in functioning while patient has been engaged in psychotherapy/psychological service- if withdrawn the patient would deteriorate and/or relapse.     MICAELA SLADE   2022                                                        ______________________________________________________________________    Individual Treatment Plan    Patient's Name: Randi Cleary  YOB: 1955    Date of Creation: 20  Date Treatment Plan Last Reviewed/Revised: 22    DSM5 Diagnoses: 296.89 Bipolar II Disorder Depressed, 300.02 (F41.1) Generalized Anxiety Disorder or Adjustment Disorders  309.89 (F43.8) Other Specified Trauma and Stressor Related Disorder  Psychosocial / Contextual Factors: Patient's entire family of origin has , she now has a sister-in-law and  as support.  Relationship with  is conflictual. She is recovering from  "surgeries  PROMIS (reviewed every 90 days): PROMIS-10 Scores  PROMIS 10-Global Health (only subscores and total score):   PROMIS-10 Scores Only 12/14/2021 3/15/2022 3/15/2022 3/15/2022 3/24/2022 4/1/2022 6/9/2022   Global Mental Health Score 6 6 6 6 8 12 12   Global Physical Health Score 9 9 9 9 8 11 10   PROMIS TOTAL - SUBSCORES 15 15 15 15 16 23 22       Referral / Collaboration:  Referral to another professional/service is not indicated at this time..    Anticipated number of session for this episode of care: 50  Anticipation frequency of session: Biweekly  Anticipated Duration of each session: 53 or more minutes due to intensity of trauma symptoms  Treatment plan will be reviewed in 90 days or when goals have been changed.   There has been demonstrated improvement in functioning while patient has been engaged in psychotherapy/psychological service- if withdrawn the patient would deteriorate and/or relapse.       MeasurableTreatment Goal(s) related to diagnosis / functional impairment(s)  Goal 1: Patient will \"jumpstart, getting going with the things I need to be doing around the house as far as picking up, doing things, trying to do something every day.  Also to lessen the animosity between me and my .\"    I will know I've met my goal when my shoulders are fixed and I can see.      Objective #A (Patient Action)    Patient will increase frequency of engaging her in ADLs.  Status: Continued - Date(s): 12/10/21, 3/9/22, 6/9/22, 9/2/22, 12/1/22    Intervention(s)  Therapist will engage patient in CBT, specifically behavioral activation.    Objective #B  Patient will  track and record at least 5 pleasant exchanges with . Patient will be able to identify at least 5 positive traits about her  and how he relates to her.  Status: Continued - Date(s): 12/10/21, 3/9/22, 6/9/22, 9/2/22, 12/1/22    Intervention(s)  Therapist will teach assertiveness skills and assign homework related to relationship " "interactions.    Objective #C  Patient will reduce level of depressive and anxious features as evidenced by reduction in score on her CHAVO-7 and PHQ-9 (scores of 15 and 16 at first measurment, respectively).  Status: Continued - Date(s): 12/10/21, 3/9/22, 6/9/22, 9/2/22, 12/1/22    Intervention(s)  Therapist will engage patient in person-centered therapy and CBT.    Patient has reviewed and agreed to the above plan.      MICAELA SLADE  December 1, 2022                                                   Randi Cleary          SAFETY PLAN:  Step 1: Warning signs / cues (Thoughts, images, mood, situation, behavior) that a crisis may be developing:  ? Thoughts: \"I don't want to continue\" \"I am unwanted\"  ? Images: none  ? Thinking Processes: ruminating  ? Mood: anger  ? Behaviors: isolating/withdrawing , can't stop crying, not taking care of myself and not taking care of my responsibilities  ? Situations: small triggers, such as not being able to find something, or dropping something   Step 2: Coping strategies - Things I can do to take my mind off of my problems without contacting another person (relaxation technique, physical activity):  ? Distress Tolerance Strategies:  arts and crafts: drawing, play with my pet , listen to positive and upbeat music: any, change body temperature (ice pack/cold water)  and paced breathing/progressive muscle relaxation  ? Physical Activities: go for a walk, deep breathing and stretching   ? Focus on helpful thoughts:  \"You've been through this before, you can get through it again.\"  Step 3: People and social settings that provide distraction:                 Name: Carmen                            Name: Darien                           Name: Aleida       ? pool, shopping, Carmen's house, [employer]       Step 4: Remind myself of people and things that are important to me and worth living for:  Clifford Little Donna, post-COVID world, options of what could be in your future        Step " 5: When I am in crisis, I can ask these people to help me use my safety plan:                 Name: Sidney  Step 6: Making the environment safe:   ? go to sleep/daydream  Step 7: Professionals or agencies I can contact during a crisis:  ? [facility] Number: [phone]  ? Suicide Prevention Lifeline: 8-302-029-TALK (6567)  ? Crisis Text Line Service (available 24 hours a day, 7 days a week): Text [place] to 302803    Local Crisis Services: [facility] Crisis: [phone]     Call 911 or go to my nearest emergency department.       I helped develop this safety plan and agree to use it when needed.  I have been given a copy of this plan.       Client signature _________________________________________________________________  Today s date:  11/24/2020  Adapted from Safety Plan Template 2008 Randi Poole and Robby Barba is reprinted with the express permission of the authors.  No portion of the Safety Plan Template may be reproduced without the express, written permission.  You can contact the authors at [email or web address] or [email or web address].

## 2022-12-27 ENCOUNTER — OFFICE VISIT (OUTPATIENT)
Dept: PALLIATIVE MEDICINE | Facility: OTHER | Age: 67
End: 2022-12-27
Payer: MEDICARE

## 2022-12-27 ENCOUNTER — VIRTUAL VISIT (OUTPATIENT)
Dept: PSYCHOLOGY | Facility: CLINIC | Age: 67
End: 2022-12-27
Payer: MEDICARE

## 2022-12-27 VITALS
HEIGHT: 66 IN | SYSTOLIC BLOOD PRESSURE: 118 MMHG | HEART RATE: 78 BPM | BODY MASS INDEX: 39.37 KG/M2 | DIASTOLIC BLOOD PRESSURE: 73 MMHG | OXYGEN SATURATION: 96 % | WEIGHT: 245 LBS

## 2022-12-27 DIAGNOSIS — F41.1 GENERALIZED ANXIETY DISORDER: ICD-10-CM

## 2022-12-27 DIAGNOSIS — F43.9 TRAUMA AND STRESSOR-RELATED DISORDER: ICD-10-CM

## 2022-12-27 DIAGNOSIS — F31.81 BIPOLAR 2 DISORDER (H): Primary | ICD-10-CM

## 2022-12-27 DIAGNOSIS — M54.81 BILATERAL OCCIPITAL NEURALGIA: Primary | ICD-10-CM

## 2022-12-27 PROCEDURE — 250N000011 HC RX IP 250 OP 636: Performed by: STUDENT IN AN ORGANIZED HEALTH CARE EDUCATION/TRAINING PROGRAM

## 2022-12-27 PROCEDURE — 90837 PSYTX W PT 60 MINUTES: CPT | Mod: 95 | Performed by: SOCIAL WORKER

## 2022-12-27 PROCEDURE — 64405 NJX AA&/STRD GR OCPL NRV: CPT | Performed by: STUDENT IN AN ORGANIZED HEALTH CARE EDUCATION/TRAINING PROGRAM

## 2022-12-27 RX ORDER — TRIAMCINOLONE ACETONIDE 40 MG/ML
40 INJECTION, SUSPENSION INTRA-ARTICULAR; INTRAMUSCULAR ONCE
Status: COMPLETED | OUTPATIENT
Start: 2022-12-27 | End: 2022-12-27

## 2022-12-27 RX ADMIN — TRIAMCINOLONE ACETONIDE 40 MG: 40 INJECTION, SUSPENSION INTRA-ARTICULAR; INTRAMUSCULAR at 09:11

## 2022-12-27 ASSESSMENT — PAIN SCALES - PAIN ENJOYMENT GENERAL ACTIVITY SCALE (PEG)
AVG_PAIN_PASTWEEK: 7
INTERFERED_ENJOYMENT_LIFE: 9
PEG_TOTALSCORE: 8.33
INTERFERED_GENERAL_ACTIVITY: 9

## 2022-12-27 ASSESSMENT — PAIN SCALES - GENERAL: PAINLEVEL: EXTREME PAIN (8)

## 2022-12-27 NOTE — PROGRESS NOTES
Pre procedure Diagnosis: bilateral occipital neuralgia   Post procedure Diagnosis: Same  Procedure performed: bilateral greater occipital nerve blocks  Anesthesia: none  Complications: none  Operators: Tyson Jackson MD    Indications:   Randi Cleary is a 67 year old female with a history of bilateral occipital neuralgia.    Options/alternatives, benefits and risks were discussed with the patient including bleeding, infection, hematoma, nerve damage, stroke, and spinal cord injury.  Questions were answered to her satisfaction and she agrees to proceed. Voluntary informed consent was obtained and signed.     Vitals were reviewed: Yes  Allergies were reviewed:  Yes   Medications were reviewed:  Yes   Pre-procedure pain score: 8/10    Procedure:  After getting informed consent, a Pause for the Cause was performed.    The occipital ridge, occipital protuberance, and mastoid process were palpated on both sides.  The location of maximal tenderness which was consistent with the location of the bilateral occipital nerves palpated.  The area was cleaned.    Palpation for a pulse was completed, with no pulse noted at the site of the injection.  A 25G, 1.5 inch needle was introduced, aimed cephalad, in the superficial tissues at this area of tenderness.  The injection was completed at this location. Aspiration for heme was negative before all injections.    In total, 4ml of 0.5% ropivacaine and 40mg Kenalog was injected.     The patient tolerated the procedure well, hemostasis was achieved.      Post-procedure pain score: see flowsheet  Follow-up includes:   -f/u with referring provider  -schedule prn    Tyson Jackson MD  Interventional Pain Medicine  [facility]

## 2022-12-27 NOTE — PROGRESS NOTES
[facility]                                     Progress Note    Patient Name: Randi Cleary  Date: 12/27/22         Service Type: Individual     Session Start Time: 2:00 PM  Session End Time: 2:54 PM     Session Length: 54 minutes    Session #: 147    Attendees: Client and  joined for about 5 minutes to share his perspective    Service Modality:  Video Visit:      Provider verified identity through the following two step process.  Patient provided:  Patient is known previously to provider    Telemedicine Visit: The patient's condition can be safely assessed and treated via synchronous audio and visual telemedicine encounter.      Reason for Telemedicine Visit: Patient convenience (e.g. access to timely appointments / distance to available provider)    Originating Site (Patient Location): Patient's home    Distant Site (Provider Location): Provider Remote Setting- Home Office    Consent:  The patient/guardian has verbally consented to: the potential risks and benefits of telemedicine (video visit) versus in person care; bill my insurance or make self-payment for services provided; and responsibility for payment of non-covered services.     Patient would like the video invitation sent by:  My Chart    Mode of Communication:  Video Conference via [facility]    Distant Location (Provider):  Off-site    As the provider I attest to compliance with applicable laws and regulations related to telemedicine.    DATA  Extended Session (53+ minutes): PROLONGED SERVICE IN THE OUTPATIENT SETTING REQUIRING DIRECT (FACE-TO-FACE) PATIENT CONTACT BEYOND THE USUAL SERVICE:    - Patient's presenting concerns require more intensive intervention than could be completed within the usual service  Interactive Complexity: No  Crisis: No        Progress Since Last Session (Related to Symptoms / Goals / Homework):   Symptoms: Patient is Continuing to have significant struggles with mood and what she has been  described as rage, she has hit herself in frustration a few times    Homework: Did not complete  Move towards the bedroom -not done  Reduce soda consumption: 3 a day for the goal -not able to be assessed  Get to the pool- twice in the week between Christmas and New Years -not done  Journal daily -not able to be assessed  Call to start day treatment -not done  Start to clear out storage locker -not done  Work on budget -not done     Episode of Care Goals: Satisfactory progress - ACTION (Actively working towards change); Intervened by reinforcing change plan / affirming steps taken     Current / Ongoing Stressors and Concerns:   Patient is currently socially isolated. She has a conflictual relationship with her .  She is getting minimal physical activity.  She had recent eye surgery and shoulder surgery.     Treatment Objective(s) Addressed in This Session:   Patient will increase frequency of engaging her in ADLs.  Patient will track and record at least 5 pleasant exchanges with . Patient will be able to identify at least 5 positive traits about her .  Patient will reduce level of depressive and anxious features as evidenced by reduction in score on her CHAVO-7 and PHQ-9 (scores of 15 and 16 at first measurment, respectively).     Intervention:   Solution Focused:   Reviewed significant increase in symptoms over the past little while, explored patient's concerns about whether this is bipolar or related to a pheochromocytoma as she has had one before.  Talked about addressing each of these concerns, she plans to follow up with an endocrinologist to be evaluated for whether or not there is a pheochromocytoma.  Discussed both meeting with a psychiatrist or Dr. Dubois to manage mental health medications, as well and is looking into day treatment.  Explored barriers to following up on this as she has been interested in doing it several times but is not thus far.      The following assessments were  completed by patient for this visit:  PHQ9: of note, patient reported she was in a hurry and didn't read all questions, she reports no suicidal ideation  PHQ-9 SCORE 11/10/2022 11/17/2022 11/29/2022 12/1/2022 12/19/2022 12/21/2022 12/27/2022   PHQ-9 Total Score MyChart 16 (Moderately severe depression) 15 (Moderately severe depression) 8 (Mild depression) 16 (Moderately severe depression) 15 (Moderately severe depression) 15 (Moderately severe depression) 19 (Moderately severe depression)   PHQ-9 Total Score 16 15 8 16 15 15 19   Some encounter information is confidential and restricted. Go to Review Flowsheets activity to see all data.     GAD7:   CHAVO-7 SCORE 11/10/2022 11/10/2022 11/10/2022 11/10/2022 12/15/2022 12/15/2022 12/15/2022   Total Score - - - 17 (severe anxiety) - - 19 (severe anxiety)   Total Score 17 17 17 17 19 19 19   Some encounter information is confidential and restricted. Go to Review Flowsheets activity to see all data.     PROMIS 10-Global Health (only subscores and total score):   PROMIS-10 Scores Only 9/6/2022 12/15/2022 12/15/2022 12/15/2022 12/27/2022 12/27/2022 12/27/2022   Global Mental Health Score 7 6 6 6 6 6 6   Global Physical Health Score 9 7 7 7 8 8 8   PROMIS TOTAL - SUBSCORES 16 13 13 13 14 14 14        ASSESSMENT: Current Emotional / Mental Status (status of significant symptoms):   Risk status (Self / Other harm or suicidal ideation)   Patient denies current fears or concerns for personal safety.   Patient denies current or recent suicidal ideation or behaviors.   Patient denies current or recent homicidal ideation or behaviors.   Patient denies current or recent self injurious behavior or ideation.   Patient denies other safety concerns.   Patient reports there has been no change in risk factors since their last session.     Patient reports there has been no change in protective factors since their last session.     A safety and risk management plan has been developed  including: Patient consented to co-developed safety plan on 11/24/2020.  Safety and risk management plan was reviewed.   Patient agreed to use safety plan should any safety concerns arise.  A copy was made available to the patient.     Appearance:   Appropriate    Eye Contact:   Good    Psychomotor Behavior: Normal    Attitude:   Cooperative    Orientation:   All   Speech    Rate / Production: Normal/ Responsive    Volume:  Normal    Mood:    Normal   Affect:    Appropriate    Thought Content:  Clear    Thought Form:  Coherent    Insight:    Good      Medication Review:   No changes to current psychiatric medication(s)     Medication Compliance:   Yes     Changes in Health Issues:   None reported     Chemical Use Review:   Substance Use: Chemical use reviewed, no active concerns identified Nothing used since August 2021.     Tobacco Use: No current tobacco use.      Diagnosis:  1. Bipolar 2 disorder (H)    2. Generalized anxiety disorder    3. Trauma and stressor-related disorder      Collateral Reports Completed:   Not Applicable    PLAN: (Patient Tasks / Therapist Tasks / Other)  Call to start day treatment    From the past:  Move towards the bedroom  Reduce soda consumption: 3 a day for the goal  Get to the pool- twice in the week between Christmas and New Years  Journal daily  Start to clear out storage locker  Work on budget      There has been demonstrated improvement in functioning while patient has been engaged in psychotherapy/psychological service- if withdrawn the patient would deteriorate and/or relapse.     MICAELA SLADE   December 27, 2022                                                        ______________________________________________________________________    Individual Treatment Plan    Patient's Name: Randi Cleary  YOB: 1955    Date of Creation: 6/30/20  Date Treatment Plan Last Reviewed/Revised: 12/1/22    DSM5 Diagnoses: 296.89 Bipolar II Disorder Depressed, 300.02  "(F41.1) Generalized Anxiety Disorder or Adjustment Disorders  309.89 (F43.8) Other Specified Trauma and Stressor Related Disorder  Psychosocial / Contextual Factors: Patient's entire family of origin has , she now has a sister-in-law and  as support.  Relationship with  is conflictual. She is recovering from surgeries  PROMIS (reviewed every 90 days): PROMIS-10 Scores  PROMIS 10-Global Health (only subscores and total score):   PROMIS-10 Scores Only 2021 3/15/2022 3/15/2022 3/15/2022 3/24/2022 2022 2022   Global Mental Health Score 6 6 6 6 8 12 12   Global Physical Health Score 9 9 9 9 8 11 10   PROMIS TOTAL - SUBSCORES 15 15 15 15 16 23 22       Referral / Collaboration:  Referral to another professional/service is not indicated at this time..    Anticipated number of session for this episode of care: 50  Anticipation frequency of session: Biweekly  Anticipated Duration of each session: 53 or more minutes due to intensity of trauma symptoms  Treatment plan will be reviewed in 90 days or when goals have been changed.   There has been demonstrated improvement in functioning while patient has been engaged in psychotherapy/psychological service- if withdrawn the patient would deteriorate and/or relapse.       MeasurableTreatment Goal(s) related to diagnosis / functional impairment(s)  Goal 1: Patient will \"jumpstart, getting going with the things I need to be doing around the house as far as picking up, doing things, trying to do something every day.  Also to lessen the animosity between me and my .\"    I will know I've met my goal when my shoulders are fixed and I can see.      Objective #A (Patient Action)    Patient will increase frequency of engaging her in ADLs.  Status: Continued - Date(s): 12/10/21, 3/9/22, 22, 22, 22    Intervention(s)  Therapist will engage patient in CBT, specifically behavioral activation.    Objective #B  Patient will  track and record at " "least 5 pleasant exchanges with . Patient will be able to identify at least 5 positive traits about her  and how he relates to her.  Status: Continued - Date(s): 12/10/21, 3/9/22, 6/9/22, 9/2/22, 12/1/22    Intervention(s)  Therapist will teach assertiveness skills and assign homework related to relationship interactions.    Objective #C  Patient will reduce level of depressive and anxious features as evidenced by reduction in score on her CHAVO-7 and PHQ-9 (scores of 15 and 16 at first measurment, respectively).  Status: Continued - Date(s): 12/10/21, 3/9/22, 6/9/22, 9/2/22, 12/1/22    Intervention(s)  Therapist will engage patient in person-centered therapy and CBT.    Patient has reviewed and agreed to the above plan.      MICAELA SLADE  December 1, 2022                                                   Randi Cleary          SAFETY PLAN:  Step 1: Warning signs / cues (Thoughts, images, mood, situation, behavior) that a crisis may be developing:  ? Thoughts: \"I don't want to continue\" \"I am unwanted\"  ? Images: none  ? Thinking Processes: ruminating  ? Mood: anger  ? Behaviors: isolating/withdrawing , can't stop crying, not taking care of myself and not taking care of my responsibilities  ? Situations: small triggers, such as not being able to find something, or dropping something   Step 2: Coping strategies - Things I can do to take my mind off of my problems without contacting another person (relaxation technique, physical activity):  ? Distress Tolerance Strategies:  arts and crafts: drawing, play with my pet , listen to positive and upbeat music: any, change body temperature (ice pack/cold water)  and paced breathing/progressive muscle relaxation  ? Physical Activities: go for a walk, deep breathing and stretching   ? Focus on helpful thoughts:  \"You've been through this before, you can get through it again.\"  Step 3: People and social settings that provide " distraction:                 Name: Carmen                            Name: Darien                           Name: Aleida       ? pool, shopping, Carmen's house, [employer]       Step 4: Remind myself of people and things that are important to me and worth living for:  Clifford Little Donna, post-COVID world, options of what could be in your future        Step 5: When I am in crisis, I can ask these people to help me use my safety plan:                 Name: Sidney  Step 6: Making the environment safe:   ? go to sleep/daydream  Step 7: Professionals or agencies I can contact during a crisis:  ? [facility] Daytime Number: [phone]  ? Suicide Prevention Lifeline: 3-614-270-CMOK (8307)  ? Crisis Text Line Service (available 24 hours a day, 7 days a week): Text [place] to 571890    [facility] Crisis Services: [facility] Crisis: [phone]     Call 911 or go to my nearest emergency department.       I helped develop this safety plan and agree to use it when needed.  I have been given a copy of this plan.       Client signature _________________________________________________________________  Today s date:  11/24/2020  Adapted from Safety Plan Template 2008 Randi Poole and Robby Barba is reprinted with the express permission of the authors.  No portion of the Safety Plan Template may be reproduced without the express, written permission.  You can contact the authors at bhs@[email or web address] [email or web address] or [email or web address].

## 2022-12-27 NOTE — PATIENT INSTRUCTIONS
[facility]  Post Procedure Instructions    Today you saw:  Dr. Jackson    Today you had:     occipital nerve block       Medications used:     ropivacaine .5%    kenolog 40 mg      Go to the emergency room if you develop any shortness of breath  Monitor the injection sites for signs and symptoms of infection-fever, chills, redness, swelling, warmth, or drainage to areas.  You may have soreness at injection sites for up to 24 hours.  It may take up to 14 days for the steroid medication to start working although you may feel the effect as early as a few days after the procedure.     You may apply ice to the painful areas to help minimize the discomfort of the needle pokes.  Do not apply heat to sites for at least 12 hours.  You may use anti-inflammatory medications or Tylenol for pain control if necessary    Pain Clinic phone number:  [phone]

## 2022-12-27 NOTE — NURSING NOTE
Pre-procedure Intake  If YES to any questions or NO to having a   Please complete laminated checklist and leave on the computer keyboard for Provider, verbally inform provider if able.    For SCS Trial, RFA's or any sedation procedure:  Have you been fasting? No     If yes, for how long?     Are you taking any any blood thinners such as Coumadin, Warfarin, Jantoven, Pradaxa Xarelto, Eliquis, Edoxaban, Enoxaparin, Lovenox, Heparin, Arixtra, Fondaparinux, or Fragmin? OR Antiplatelet medication such as Plavix, Brilinta, or Effient?   No     If yes, when did you take your last dose? No    Do you take aspirin?  No    If cervical procedure, have you held aspirin for 6 days?   No     Do you have any allergies to contrast dye, iodine, steroid and/or numbing medications?  NO    Are you currently taking antibiotics or have an active infection?  NO    Have you had a fever/elevated temperature within the past week? NO    Are you currently taking oral steroids? NO    Do you have a ? Yes    Are you pregnant or breastfeeding?  Not Applicable    Have you received the COVID-19 vaccine? Yes    If yes, was it your 1st, 2nd or only dose needed? 4 vaccines    Date of most recent vaccine: 7/25/2022    Notify provider and RNs if systolic BP >170, diastolic BP >100, P >100 or O2 sats < 90%      [facility] Number:  [phone]    Call with any questions about your care and for scheduling assistance.     Calls are returned Monday through Friday between 8 AM and 4:30 PM. We usually get back to you within 2 business days depending on the issue/request.    If we are prescribing your medications:    For opioid medication refills, call the clinic or send a [employer] message 7 days in advance.  Please include:    Name of requested medication    Name of the pharmacy.    For non-opioid medications, call your pharmacy directly to request a refill. Please allow 3-4 days to be processed.      Per [place] State Law:    All controlled substance prescriptions must be filled within 30 days of being written.      For those controlled substances allowing refills, pickup must occur within 30 days of last fill.      We believe regular attendance is key to your success in our program!      Any time you are unable to keep your appointment we ask that you call us at least 24 hours in advance to cancel.This will allow us to offer the appointment time to another patient.     Multiple missed appointments may lead to dismissal from the clinic.

## 2022-12-30 ENCOUNTER — VIRTUAL VISIT (OUTPATIENT)
Dept: PSYCHOLOGY | Facility: CLINIC | Age: 67
End: 2022-12-30
Payer: MEDICARE

## 2022-12-30 DIAGNOSIS — F31.81 BIPOLAR 2 DISORDER (H): Primary | ICD-10-CM

## 2022-12-30 DIAGNOSIS — F41.1 GENERALIZED ANXIETY DISORDER: ICD-10-CM

## 2022-12-30 DIAGNOSIS — F43.9 TRAUMA AND STRESSOR-RELATED DISORDER: ICD-10-CM

## 2022-12-30 PROCEDURE — 90834 PSYTX W PT 45 MINUTES: CPT | Mod: 95 | Performed by: SOCIAL WORKER

## 2022-12-30 NOTE — PROGRESS NOTES
"[facility]                                     Progress Note    Patient Name: Randi Cleary  Date: 12/30/22         Service Type: Individual     Session Start Time: 11:01 AM  Session End Time: 11:50 AM     Session Length: 49 minutes    Session #: 148    Attendees: Client attended alone    Service Modality:  Video Visit:      Provider verified identity through the following two step process.  Patient provided:  Patient is known previously to provider    Telemedicine Visit: The patient's condition can be safely assessed and treated via synchronous audio and visual telemedicine encounter.      Reason for Telemedicine Visit: Patient convenience (e.g. access to timely appointments / distance to available provider)    Originating Site (Patient Location): Patient's home    Distant Site (Provider Location): Provider Remote Setting- Home Office    Consent:  The patient/guardian has verbally consented to: the potential risks and benefits of telemedicine (video visit) versus in person care; bill my insurance or make self-payment for services provided; and responsibility for payment of non-covered services.     Patient would like the video invitation sent by:  My Chart    Mode of Communication:  [facility]    Distant Location (Provider):  Off-site    As the provider I attest to compliance with applicable laws and regulations related to telemedicine.    DATA  Extended Session (53+ minutes): No  Interactive Complexity: No  Crisis: No        Progress Since Last Session (Related to Symptoms / Goals / Homework):   Symptoms: \"The day is not going well, I've been spinning my wheels all week, getting the bare minimum done.\"    Homework: Partially completed  Call to start day treatment- done, intake scheduled 1/9/23    From the past:  Move towards the bedroom  Reduce soda consumption: 3 a day for the goal  Get to the pool- twice in the week between Christmas and New Years  Journal daily  Start " to clear out storage locker  Work on budget     Episode of Care Goals: Satisfactory progress - ACTION (Actively working towards change); Intervened by reinforcing change plan / affirming steps taken     Current / Ongoing Stressors and Concerns:   Patient is currently socially isolated. She has a conflictual relationship with her .  She is getting minimal physical activity.  She had recent eye surgery and shoulder surgery.     Treatment Objective(s) Addressed in This Session:   Patient will increase frequency of engaging her in ADLs.  Patient will track and record at least 5 pleasant exchanges with . Patient will be able to identify at least 5 positive traits about her .  Patient will reduce level of depressive and anxious features as evidenced by reduction in score on her CHAVO-7 and PHQ-9 (scores of 15 and 16 at first measurment, respectively).     Intervention:   Solution Focused:   Discussed patient's recent challenges with health care. Reviewed homework and identified successes and barriers to completion.  Talked about the upcoming day treatment program and her concerns with this and excitement for this.  Explored how she has been spending her 's time off with him anything she liked to do with remainder of the time off.  Talked about her recent several episodes of spiraling in anxiety and how she hopes to be able to do something different in the future.  Recognized that when she gets up and does things such as getting dressed or leaving the house she tends to really reduce the anxiety, also talked about how to continue this.    The following assessments were completed by patient for this visit:  PHQ9: of note, patient reported she was in a hurry and didn't read all questions, she reports no suicidal ideation  PHQ-9 SCORE 11/17/2022 11/29/2022 12/1/2022 12/19/2022 12/21/2022 12/27/2022 12/30/2022   PHQ-9 Total Score MyChart 15 (Moderately severe depression) 8 (Mild depression) 16  (Moderately severe depression) 15 (Moderately severe depression) 15 (Moderately severe depression) 19 (Moderately severe depression) 18 (Moderately severe depression)   PHQ-9 Total Score 15 8 16 15 15 19 18   Some encounter information is confidential and restricted. Go to Review Flowsheets activity to see all data.     GAD7:   CHAVO-7 SCORE 11/10/2022 11/10/2022 11/10/2022 11/10/2022 12/15/2022 12/15/2022 12/15/2022   Total Score - - - 17 (severe anxiety) - - 19 (severe anxiety)   Total Score 17 17 17 17 19 19 19   Some encounter information is confidential and restricted. Go to Review Flowsheets activity to see all data.     PROMIS 10-Global Health (only subscores and total score):   PROMIS-10 Scores Only 9/6/2022 12/15/2022 12/15/2022 12/15/2022 12/27/2022 12/27/2022 12/27/2022   Global Mental Health Score 7 6 6 6 6 6 6   Global Physical Health Score 9 7 7 7 8 8 8   PROMIS TOTAL - SUBSCORES 16 13 13 13 14 14 14        ASSESSMENT: Current Emotional / Mental Status (status of significant symptoms):   Risk status (Self / Other harm or suicidal ideation)   Patient denies current fears or concerns for personal safety.   Patient denies current or recent suicidal ideation or behaviors.   Patient denies current or recent homicidal ideation or behaviors.   Patient denies current or recent self injurious behavior or ideation.   Patient denies other safety concerns.   Patient reports there has been no change in risk factors since their last session.     Patient reports there has been no change in protective factors since their last session.     A safety and risk management plan has been developed including: Patient consented to co-developed safety plan on 11/24/2020.  Safety and risk management plan was reviewed.   Patient agreed to use safety plan should any safety concerns arise.  A copy was made available to the patient.     Appearance:   Appropriate    Eye Contact:   Good    Psychomotor Behavior: Normal     Attitude:   Cooperative    Orientation:   All   Speech    Rate / Production: Normal/ Responsive    Volume:  Normal    Mood:    Normal   Affect:    Appropriate    Thought Content:  Clear    Thought Form:  Coherent    Insight:    Good      Medication Review:   No changes to current psychiatric medication(s)     Medication Compliance:   Yes     Changes in Health Issues:   None reported     Chemical Use Review:   Substance Use: Chemical use reviewed, no active concerns identified Nothing used since 2021.     Tobacco Use: No current tobacco use.      Diagnosis:  1. Bipolar 2 disorder (H)    2. Generalized anxiety disorder    3. Trauma and stressor-related disorder      Collateral Reports Completed:   Not Applicable    PLAN: (Patient Tasks / Therapist Tasks / Other)  From the past:  Move towards the bedroom  Reduce soda consumption: 3 a day for the goal  Journal daily  Start to clear out storage locker  Work on budget      There has been demonstrated improvement in functioning while patient has been engaged in psychotherapy/psychological service- if withdrawn the patient would deteriorate and/or relapse.     MICAELA SLADE   2022                                                        ______________________________________________________________________    Individual Treatment Plan    Patient's Name: Randi Cleary  YOB: 1955    Date of Creation: 20  Date Treatment Plan Last Reviewed/Revised: 22    DSM5 Diagnoses: 296.89 Bipolar II Disorder Depressed, 300.02 (F41.1) Generalized Anxiety Disorder or Adjustment Disorders  309.89 (F43.8) Other Specified Trauma and Stressor Related Disorder  Psychosocial / Contextual Factors: Patient's entire family of origin has , she now has a sister-in-law and  as support.  Relationship with  is conflictual. She is recovering from surgeries  PROMIS (reviewed every 90 days): PROMIS-10 Scores  PROMIS 10-Global Health  "(only subscores and total score):   PROMIS-10 Scores Only 12/14/2021 3/15/2022 3/15/2022 3/15/2022 3/24/2022 4/1/2022 6/9/2022   Global Mental Health Score 6 6 6 6 8 12 12   Global Physical Health Score 9 9 9 9 8 11 10   PROMIS TOTAL - SUBSCORES 15 15 15 15 16 23 22       Referral / Collaboration:  Referral to another professional/service is not indicated at this time..    Anticipated number of session for this episode of care: 50  Anticipation frequency of session: Biweekly  Anticipated Duration of each session: 53 or more minutes due to intensity of trauma symptoms  Treatment plan will be reviewed in 90 days or when goals have been changed.   There has been demonstrated improvement in functioning while patient has been engaged in psychotherapy/psychological service- if withdrawn the patient would deteriorate and/or relapse.       MeasurableTreatment Goal(s) related to diagnosis / functional impairment(s)  Goal 1: Patient will \"jumpstart, getting going with the things I need to be doing around the house as far as picking up, doing things, trying to do something every day.  Also to lessen the animosity between me and my .\"    I will know I've met my goal when my shoulders are fixed and I can see.      Objective #A (Patient Action)    Patient will increase frequency of engaging her in ADLs.  Status: Continued - Date(s): 12/10/21, 3/9/22, 6/9/22, 9/2/22, 12/1/22    Intervention(s)  Therapist will engage patient in CBT, specifically behavioral activation.    Objective #B  Patient will  track and record at least 5 pleasant exchanges with . Patient will be able to identify at least 5 positive traits about her  and how he relates to her.  Status: Continued - Date(s): 12/10/21, 3/9/22, 6/9/22, 9/2/22, 12/1/22    Intervention(s)  Therapist will teach assertiveness skills and assign homework related to relationship interactions.    Objective #C  Patient will reduce level of depressive and anxious " "features as evidenced by reduction in score on her CHAVO-7 and PHQ-9 (scores of 15 and 16 at first measurment, respectively).  Status: Continued - Date(s): 12/10/21, 3/9/22, 6/9/22, 9/2/22, 12/1/22    Intervention(s)  Therapist will engage patient in person-centered therapy and CBT.    Patient has reviewed and agreed to the above plan.      MICAELA SLADE  December 1, 2022                                                   Randi Cleary          SAFETY PLAN:  Step 1: Warning signs / cues (Thoughts, images, mood, situation, behavior) that a crisis may be developing:  ? Thoughts: \"I don't want to continue\" \"I am unwanted\"  ? Images: none  ? Thinking Processes: ruminating  ? Mood: anger  ? Behaviors: isolating/withdrawing , can't stop crying, not taking care of myself and not taking care of my responsibilities  ? Situations: small triggers, such as not being able to find something, or dropping something   Step 2: Coping strategies - Things I can do to take my mind off of my problems without contacting another person (relaxation technique, physical activity):  ? Distress Tolerance Strategies:  arts and crafts: drawing, play with my pet , listen to positive and upbeat music: any, change body temperature (ice pack/cold water)  and paced breathing/progressive muscle relaxation  ? Physical Activities: go for a walk, deep breathing and stretching   ? Focus on helpful thoughts:  \"You've been through this before, you can get through it again.\"  Step 3: People and social settings that provide distraction:                 Name: Carmen                            Name: D[place]en                           Name: Aleida       ? pool, shopping, Carmen's house, [employer]       Step 4: Remind myself of people and things that are important to me and worth living for:  Sid[place], Aleida Horton, post-COVID world, options of what could be in your future        Step 5: When I am in crisis, I can ask these people to help me use my safety " plan:                 Name: Sidney  Step 6: Making the environment safe:   ? go to sleep/daydream  Step 7: Professionals or agencies I can contact during a crisis:  ? [facility] Number: [phone]  ? Suicide Prevention Lifeline: 4-049-953-WWLO (1915)  ? Crisis Text Line Service (available 24 hours a day, 7 days a week): Text [place] to 017685    Local Crisis Services: [facility] Crisis: [phone]     Call 911 or go to my nearest emergency department.       I helped develop this safety plan and agree to use it when needed.  I have been given a copy of this plan.       Client signature _________________________________________________________________  Today s date:  11/24/2020  Adapted from Safety Plan Template 2008 Randi Poole and Robby Barba is reprinted with the express permission of the authors.  No portion of the Safety Plan Template may be reproduced without the express, written permission.  You can contact the authors at [email or web address] or [email or web address].   General

## 2023-01-03 ENCOUNTER — VIRTUAL VISIT (OUTPATIENT)
Dept: PSYCHOLOGY | Facility: CLINIC | Age: 68
End: 2023-01-03
Payer: MEDICARE

## 2023-01-03 DIAGNOSIS — F31.81 BIPOLAR 2 DISORDER (H): Primary | ICD-10-CM

## 2023-01-03 DIAGNOSIS — F43.9 TRAUMA AND STRESSOR-RELATED DISORDER: ICD-10-CM

## 2023-01-03 DIAGNOSIS — F41.1 GENERALIZED ANXIETY DISORDER: ICD-10-CM

## 2023-01-03 PROCEDURE — 90837 PSYTX W PT 60 MINUTES: CPT | Mod: 95 | Performed by: SOCIAL WORKER

## 2023-01-03 NOTE — PROGRESS NOTES
[facility]                                     Progress Note    Patient Name: Randi Cleary  Date: 1/3/23         Service Type: Individual     Session Start Time: 11:45 AM  Session End Time: 12:45 PM     Session Length: 60 minutes    Session #: 149    Attendees: Client attended alone    Service Modality:  Video Visit:      Provider verified identity through the following two step process.  Patient provided:  Patient is known previously to provider    Telemedicine Visit: The patient's condition can be safely assessed and treated via synchronous audio and visual telemedicine encounter.      Reason for Telemedicine Visit: Patient convenience (e.g. access to timely appointments / distance to available provider)    Originating Site (Patient Location): Patient's home    Distant Site (Provider Location): Provider Remote Setting- Home Office    Consent:  The patient/guardian has verbally consented to: the potential risks and benefits of telemedicine (video visit) versus in person care; bill my insurance or make self-payment for services provided; and responsibility for payment of non-covered services.     Patient would like the video invitation sent by:  My Chart    Mode of Communication:  Video Conference via [facility]    Distant Location (Provider):  Off-site    As the provider I attest to compliance with applicable laws and regulations related to telemedicine.    DATA  Extended Session (53+ minutes): No  Interactive Complexity: No  Crisis: No        Progress Since Last Session (Related to Symptoms / Goals / Homework):   Symptoms: Patient is continuing to have challenges regulating their mood    Homework: Partially completed  Call to start day treatment- done, intake scheduled 1/9/23    From the past:  Move towards the bedroom  Reduce soda consumption: 3 a day for the goal  Get to the pool- twice in the week between Christmas and New Years  Journal daily  Start to clear out storage locker  Work on  budget     Episode of Care Goals: Satisfactory progress - ACTION (Actively working towards change); Intervened by reinforcing change plan / affirming steps taken     Current / Ongoing Stressors and Concerns:   Patient is currently socially isolated. She has a conflictual relationship with her .  She is getting minimal physical activity.  She had recent eye surgery and shoulder surgery.     Treatment Objective(s) Addressed in This Session:   Patient will increase frequency of engaging her in ADLs.  Patient will track and record at least 5 pleasant exchanges with . Patient will be able to identify at least 5 positive traits about her .  Patient will reduce level of depressive and anxious features as evidenced by reduction in score on her CHAVO-7 and PHQ-9 (scores of 15 and 16 at first measurment, respectively).     Intervention:   Solution Focused:   Reflected on what she has accomplished, including laundry, making soup, and other cooking. Dsicussed reducing tasks and focusing on self care.  Discussed focusing on her medical needs right now, prioritize getting to her surgeons consultation tomorrow.  Talked about how she was managing some of the extraneous things happening in her life and how to refocus on what is important to her.    The following assessments were completed by patient for this visit:  PHQ9: of note, patient reported she was in a hurry and didn't read all questions, she reports no suicidal ideation  PHQ-9 SCORE 11/17/2022 11/29/2022 12/1/2022 12/19/2022 12/21/2022 12/27/2022 12/30/2022   PHQ-9 Total Score MyChart 15 (Moderately severe depression) 8 (Mild depression) 16 (Moderately severe depression) 15 (Moderately severe depression) 15 (Moderately severe depression) 19 (Moderately severe depression) 18 (Moderately severe depression)   PHQ-9 Total Score 15 8 16 15 15 19 18   Some encounter information is confidential and restricted. Go to Review Flowsheets activity to see all data.      GAD7:   CHAVO-7 SCORE 11/10/2022 11/10/2022 11/10/2022 11/10/2022 12/15/2022 12/15/2022 12/15/2022   Total Score - - - 17 (severe anxiety) - - 19 (severe anxiety)   Total Score 17 17 17 17 19 19 19   Some encounter information is confidential and restricted. Go to Review Flowsheets activity to see all data.     PROMIS 10-Global Health (only subscores and total score):   PROMIS-10 Scores Only 9/6/2022 12/15/2022 12/15/2022 12/15/2022 12/27/2022 12/27/2022 12/27/2022   Global Mental Health Score 7 6 6 6 6 6 6   Global Physical Health Score 9 7 7 7 8 8 8   PROMIS TOTAL - SUBSCORES 16 13 13 13 14 14 14        ASSESSMENT: Current Emotional / Mental Status (status of significant symptoms):   Risk status (Self / Other harm or suicidal ideation)   Patient denies current fears or concerns for personal safety.   Patient denies current or recent suicidal ideation or behaviors.   Patient denies current or recent homicidal ideation or behaviors.   Patient denies current or recent self injurious behavior or ideation.   Patient denies other safety concerns.   Patient reports there has been no change in risk factors since their last session.     Patient reports there has been no change in protective factors since their last session.     A safety and risk management plan has been developed including: Patient consented to co-developed safety plan on 11/24/2020.  Safety and risk management plan was reviewed.   Patient agreed to use safety plan should any safety concerns arise.  A copy was made available to the patient.     Appearance:   Appropriate    Eye Contact:   Good    Psychomotor Behavior: Normal    Attitude:   Cooperative    Orientation:   All   Speech    Rate / Production: Normal/ Responsive    Volume:  Normal    Mood:    Normal   Affect:    Appropriate    Thought Content:  Clear    Thought Form:  Coherent    Insight:    Good      Medication Review:   No changes to current psychiatric medication(s)     Medication  Compliance:   Yes     Changes in Health Issues:   None reported     Chemical Use Review:   Substance Use: Chemical use reviewed, no active concerns identified Nothing used since 2021.     Tobacco Use: No current tobacco use.      Diagnosis:  1. Bipolar 2 disorder (H)    2. Generalized anxiety disorder    3. Trauma and stressor-related disorder      Collateral Reports Completed:   Not Applicable    PLAN: (Patient Tasks / Therapist Tasks / Other)  Focused on getting to your [place] appointment      There has been demonstrated improvement in functioning while patient has been engaged in psychotherapy/psychological service- if withdrawn the patient would deteriorate and/or relapse.     MICAELA SLADE   January 3, 2023                                                        ______________________________________________________________________    Individual Treatment Plan    Patient's Name: Randi Cleary  YOB: 1955    Date of Creation: 20  Date Treatment Plan Last Reviewed/Revised: 22    DSM5 Diagnoses: 296.89 Bipolar II Disorder Depressed, 300.02 (F41.1) Generalized Anxiety Disorder or Adjustment Disorders  309.89 (F43.8) Other Specified Trauma and Stressor Related Disorder  Psychosocial / Contextual Factors: Patient's entire family of origin has , she now has a sister-in-law and  as support.  Relationship with  is conflictual. She is recovering from surgeries  PROMIS (reviewed every 90 days): PROMIS-10 Scores  PROMIS 10-Global Health (only subscores and total score):   PROMIS-10 Scores Only 2021 3/15/2022 3/15/2022 3/15/2022 3/24/2022 2022 2022   Global Mental Health Score 6 6 6 6 8 12 12   Global Physical Health Score 9 9 9 9 8 11 10   PROMIS TOTAL - SUBSCORES 15 15 15 15 16 23 22       Referral / Collaboration:  Referral to another professional/service is not indicated at this time..    Anticipated number of session for this episode of care:  "50  Anticipation frequency of session: Biweekly  Anticipated Duration of each session: 53 or more minutes due to intensity of trauma symptoms  Treatment plan will be reviewed in 90 days or when goals have been changed.   There has been demonstrated improvement in functioning while patient has been engaged in psychotherapy/psychological service- if withdrawn the patient would deteriorate and/or relapse.       MeasurableTreatment Goal(s) related to diagnosis / functional impairment(s)  Goal 1: Patient will \"jumpstart, getting going with the things I need to be doing around the house as far as picking up, doing things, trying to do something every day.  Also to lessen the animosity between me and my .\"    I will know I've met my goal when my shoulders are fixed and I can see.      Objective #A (Patient Action)    Patient will increase frequency of engaging her in ADLs.  Status: Continued - Date(s): 12/10/21, 3/9/22, 6/9/22, 9/2/22, 12/1/22    Intervention(s)  Therapist will engage patient in CBT, specifically behavioral activation.    Objective #B  Patient will  track and record at least 5 pleasant exchanges with . Patient will be able to identify at least 5 positive traits about her  and how he relates to her.  Status: Continued - Date(s): 12/10/21, 3/9/22, 6/9/22, 9/2/22, 12/1/22    Intervention(s)  Therapist will teach assertiveness skills and assign homework related to relationship interactions.    Objective #C  Patient will reduce level of depressive and anxious features as evidenced by reduction in score on her CHAVO-7 and PHQ-9 (scores of 15 and 16 at first measurment, respectively).  Status: Continued - Date(s): 12/10/21, 3/9/22, 6/9/22, 9/2/22, 12/1/22    Intervention(s)  Therapist will engage patient in person-centered therapy and CBT.    Patient has reviewed and agreed to the above plan.      MICAELA SLADE  December 1, 2022                                                   Randi RIVERA" "Cathy Cleary          SAFETY PLAN:  Step 1: Warning signs / cues (Thoughts, images, mood, situation, behavior) that a crisis may be developing:  ? Thoughts: \"I don't want to continue\" \"I am unwanted\"  ? Images: none  ? Thinking Processes: ruminating  ? Mood: anger  ? Behaviors: isolating/withdrawing , can't stop crying, not taking care of myself and not taking care of my responsibilities  ? Situations: small triggers, such as not being able to find something, or dropping something   Step 2: Coping strategies - Things I can do to take my mind off of my problems without contacting another person (relaxation technique, physical activity):  ? Distress Tolerance Strategies:  arts and crafts: drawing, play with my pet , listen to positive and upbeat music: any, change body temperature (ice pack/cold water)  and paced breathing/progressive muscle relaxation  ? Physical Activities: go for a walk, deep breathing and stretching   ? Focus on helpful thoughts:  \"You've been through this before, you can get through it again.\"  Step 3: People and social settings that provide distraction:                 Name: Carmen                            Name: Darien                           Name: Aleida       ? pool, shopping, Carmen's house, [employer]       Step 4: Remind myself of people and things that are important to me and worth living for:  Clifford Little Donna, post-COVID world, options of what could be in your future        Step 5: When I am in crisis, I can ask these people to help me use my safety plan:                 Name: Sidney  Step 6: Making the environment safe:   ? go to sleep/daydream  Step 7: Professionals or agencies I can contact during a crisis:  ? [facility] Daytime Number: [phone]  ? Suicide Prevention Lifeline: 2-595-662-TALK (0815)  ? Crisis Text Line Service (available 24 hours a day, 7 days a week): Text [place] to 965508    Local Crisis Services: [facility] Crisis: [phone]     Call 911 or go " to my nearest emergency department.       I helped develop this safety plan and agree to use it when needed.  I have been given a copy of this plan.       Client signature _________________________________________________________________  Today s date:  11/24/2020  Adapted from Safety Plan Template 2008 Randi Poole and Robby Barba is reprinted with the express permission of the authors.  No portion of the Safety Plan Template may be reproduced without the express, written permission.  You can contact the authors at [email or web address] or [email or web address].

## 2023-01-04 ENCOUNTER — TRANSFERRED RECORDS (OUTPATIENT)
Dept: HEALTH INFORMATION MANAGEMENT | Facility: CLINIC | Age: 68
End: 2023-01-04
Payer: MEDICARE

## 2023-01-04 ENCOUNTER — TELEPHONE (OUTPATIENT)
Dept: NEUROLOGY | Facility: CLINIC | Age: 68
End: 2023-01-04

## 2023-01-04 NOTE — TELEPHONE ENCOUNTER
Discussed via telephone CT cervical results with Winnie. No surgical recommendations per Dr Gregory. Winnie continues with care with Dr Dubois and Dr Jackson who performed occipital nerve block a few weeks ago. We are available if there are questions or concerns.     AZUCENA Max  [facility]  O: [phone]

## 2023-01-05 ENCOUNTER — TELEPHONE (OUTPATIENT)
Dept: BEHAVIORAL HEALTH | Facility: CLINIC | Age: 68
End: 2023-01-05

## 2023-01-05 ENCOUNTER — VIRTUAL VISIT (OUTPATIENT)
Dept: PSYCHOLOGY | Facility: CLINIC | Age: 68
End: 2023-01-05
Payer: MEDICARE

## 2023-01-05 DIAGNOSIS — F31.81 BIPOLAR 2 DISORDER (H): Primary | ICD-10-CM

## 2023-01-05 DIAGNOSIS — F41.1 GENERALIZED ANXIETY DISORDER: ICD-10-CM

## 2023-01-05 DIAGNOSIS — F43.9 TRAUMA AND STRESSOR-RELATED DISORDER: ICD-10-CM

## 2023-01-05 PROCEDURE — 90834 PSYTX W PT 45 MINUTES: CPT | Mod: 95 | Performed by: SOCIAL WORKER

## 2023-01-05 ASSESSMENT — ANXIETY QUESTIONNAIRES
GAD7 TOTAL SCORE: 17
GAD7 TOTAL SCORE: 17
3. WORRYING TOO MUCH ABOUT DIFFERENT THINGS: MORE THAN HALF THE DAYS
2. NOT BEING ABLE TO STOP OR CONTROL WORRYING: NEARLY EVERY DAY
7. FEELING AFRAID AS IF SOMETHING AWFUL MIGHT HAPPEN: MORE THAN HALF THE DAYS
1. FEELING NERVOUS, ANXIOUS, OR ON EDGE: NEARLY EVERY DAY
3. WORRYING TOO MUCH ABOUT DIFFERENT THINGS: MORE THAN HALF THE DAYS
GAD7 TOTAL SCORE: 17
4. TROUBLE RELAXING: NEARLY EVERY DAY
7. FEELING AFRAID AS IF SOMETHING AWFUL MIGHT HAPPEN: MORE THAN HALF THE DAYS
7. FEELING AFRAID AS IF SOMETHING AWFUL MIGHT HAPPEN: MORE THAN HALF THE DAYS
GAD7 TOTAL SCORE: 17
1. FEELING NERVOUS, ANXIOUS, OR ON EDGE: NEARLY EVERY DAY
IF YOU CHECKED OFF ANY PROBLEMS ON THIS QUESTIONNAIRE, HOW DIFFICULT HAVE THESE PROBLEMS MADE IT FOR YOU TO DO YOUR WORK, TAKE CARE OF THINGS AT HOME, OR GET ALONG WITH OTHER PEOPLE: VERY DIFFICULT
5. BEING SO RESTLESS THAT IT IS HARD TO SIT STILL: SEVERAL DAYS
GAD7 TOTAL SCORE: 17
8. IF YOU CHECKED OFF ANY PROBLEMS, HOW DIFFICULT HAVE THESE MADE IT FOR YOU TO DO YOUR WORK, TAKE CARE OF THINGS AT HOME, OR GET ALONG WITH OTHER PEOPLE?: VERY DIFFICULT
7. FEELING AFRAID AS IF SOMETHING AWFUL MIGHT HAPPEN: MORE THAN HALF THE DAYS
5. BEING SO RESTLESS THAT IT IS HARD TO SIT STILL: SEVERAL DAYS
4. TROUBLE RELAXING: NEARLY EVERY DAY
4. TROUBLE RELAXING: NEARLY EVERY DAY
GAD7 TOTAL SCORE: 17
1. FEELING NERVOUS, ANXIOUS, OR ON EDGE: NEARLY EVERY DAY
3. WORRYING TOO MUCH ABOUT DIFFERENT THINGS: MORE THAN HALF THE DAYS
4. TROUBLE RELAXING: NEARLY EVERY DAY
GAD7 TOTAL SCORE: 17
IF YOU CHECKED OFF ANY PROBLEMS ON THIS QUESTIONNAIRE, HOW DIFFICULT HAVE THESE PROBLEMS MADE IT FOR YOU TO DO YOUR WORK, TAKE CARE OF THINGS AT HOME, OR GET ALONG WITH OTHER PEOPLE: VERY DIFFICULT
5. BEING SO RESTLESS THAT IT IS HARD TO SIT STILL: SEVERAL DAYS
GAD7 TOTAL SCORE: 17
6. BECOMING EASILY ANNOYED OR IRRITABLE: NEARLY EVERY DAY
GAD7 TOTAL SCORE: 17
2. NOT BEING ABLE TO STOP OR CONTROL WORRYING: NEARLY EVERY DAY
6. BECOMING EASILY ANNOYED OR IRRITABLE: NEARLY EVERY DAY
1. FEELING NERVOUS, ANXIOUS, OR ON EDGE: NEARLY EVERY DAY
7. FEELING AFRAID AS IF SOMETHING AWFUL MIGHT HAPPEN: MORE THAN HALF THE DAYS
8. IF YOU CHECKED OFF ANY PROBLEMS, HOW DIFFICULT HAVE THESE MADE IT FOR YOU TO DO YOUR WORK, TAKE CARE OF THINGS AT HOME, OR GET ALONG WITH OTHER PEOPLE?: VERY DIFFICULT
IF YOU CHECKED OFF ANY PROBLEMS ON THIS QUESTIONNAIRE, HOW DIFFICULT HAVE THESE PROBLEMS MADE IT FOR YOU TO DO YOUR WORK, TAKE CARE OF THINGS AT HOME, OR GET ALONG WITH OTHER PEOPLE: VERY DIFFICULT
7. FEELING AFRAID AS IF SOMETHING AWFUL MIGHT HAPPEN: MORE THAN HALF THE DAYS
5. BEING SO RESTLESS THAT IT IS HARD TO SIT STILL: SEVERAL DAYS
IF YOU CHECKED OFF ANY PROBLEMS ON THIS QUESTIONNAIRE, HOW DIFFICULT HAVE THESE PROBLEMS MADE IT FOR YOU TO DO YOUR WORK, TAKE CARE OF THINGS AT HOME, OR GET ALONG WITH OTHER PEOPLE: VERY DIFFICULT
8. IF YOU CHECKED OFF ANY PROBLEMS, HOW DIFFICULT HAVE THESE MADE IT FOR YOU TO DO YOUR WORK, TAKE CARE OF THINGS AT HOME, OR GET ALONG WITH OTHER PEOPLE?: VERY DIFFICULT
GAD7 TOTAL SCORE: 17
3. WORRYING TOO MUCH ABOUT DIFFERENT THINGS: MORE THAN HALF THE DAYS
2. NOT BEING ABLE TO STOP OR CONTROL WORRYING: NEARLY EVERY DAY
2. NOT BEING ABLE TO STOP OR CONTROL WORRYING: NEARLY EVERY DAY
7. FEELING AFRAID AS IF SOMETHING AWFUL MIGHT HAPPEN: MORE THAN HALF THE DAYS
GAD7 TOTAL SCORE: 17
6. BECOMING EASILY ANNOYED OR IRRITABLE: NEARLY EVERY DAY
7. FEELING AFRAID AS IF SOMETHING AWFUL MIGHT HAPPEN: MORE THAN HALF THE DAYS
GAD7 TOTAL SCORE: 17
8. IF YOU CHECKED OFF ANY PROBLEMS, HOW DIFFICULT HAVE THESE MADE IT FOR YOU TO DO YOUR WORK, TAKE CARE OF THINGS AT HOME, OR GET ALONG WITH OTHER PEOPLE?: VERY DIFFICULT
6. BECOMING EASILY ANNOYED OR IRRITABLE: NEARLY EVERY DAY

## 2023-01-05 ASSESSMENT — PATIENT HEALTH QUESTIONNAIRE - PHQ9
SUM OF ALL RESPONSES TO PHQ QUESTIONS 1-9: 16
10. IF YOU CHECKED OFF ANY PROBLEMS, HOW DIFFICULT HAVE THESE PROBLEMS MADE IT FOR YOU TO DO YOUR WORK, TAKE CARE OF THINGS AT HOME, OR GET ALONG WITH OTHER PEOPLE: VERY DIFFICULT
SUM OF ALL RESPONSES TO PHQ QUESTIONS 1-9: 16
10. IF YOU CHECKED OFF ANY PROBLEMS, HOW DIFFICULT HAVE THESE PROBLEMS MADE IT FOR YOU TO DO YOUR WORK, TAKE CARE OF THINGS AT HOME, OR GET ALONG WITH OTHER PEOPLE: VERY DIFFICULT

## 2023-01-05 NOTE — PROGRESS NOTES
[facility]                                     Progress Note    Patient Name: Randi Cleary  Date: 1/5/23         Service Type: Individual     Session Start Time: 11:30 AM  Session End Time: 12:17 PM     Session Length: 47 minutes    Session #: 150    Attendees: Client attended alone    Service Modality:  Video Visit:      Provider verified identity through the following two step process.  Patient provided:  Patient is known previously to provider    Telemedicine Visit: The patient's condition can be safely assessed and treated via synchronous audio and visual telemedicine encounter.      Reason for Telemedicine Visit: Patient convenience (e.g. access to timely appointments / distance to available provider)    Originating Site (Patient Location): Patient's home    Distant Site (Provider Location): Provider Remote Setting- Home Office    Consent:  The patient/guardian has verbally consented to: the potential risks and benefits of telemedicine (video visit) versus in person care; bill my insurance or make self-payment for services provided; and responsibility for payment of non-covered services.     Patient would like the video invitation sent by:  My Chart    Mode of Communication:  Video Conference via [facility]well    Distant Location (Provider):  Off-site    As the provider I attest to compliance with applicable laws and regulations related to telemedicine.    DATA  Extended Session (53+ minutes): No  Interactive Complexity: No  Crisis: No        Progress Since Last Session (Related to Symptoms / Goals / Homework):   Symptoms: Patient is continuing to have challenges with her mood    Homework: Partially completed  From the past:  Move towards the bedroom  Reduce soda consumption: 3 a day for the goal  Journal daily  Start to clear out storage locker  Work on budget     Episode of Care Goals: Satisfactory progress - ACTION (Actively working towards change); Intervened by reinforcing change plan  / affirming steps taken     Current / Ongoing Stressors and Concerns:   Patient is currently socially isolated. She has a conflictual relationship with her .  She is getting minimal physical activity.  She had recent eye surgery and shoulder surgery.     Treatment Objective(s) Addressed in This Session:   Patient will increase frequency of engaging her in ADLs.  Patient will track and record at least 5 pleasant exchanges with . Patient will be able to identify at least 5 positive traits about her .  Patient will reduce level of depressive and anxious features as evidenced by reduction in score on her CHAVO-7 and PHQ-9 (scores of 15 and 16 at first measurment, respectively).     Intervention:   Solution Focused:   Discussed patient's challenges with her mood.  Talked about all of her medical concerns and how she is managing all of these appointments.  Talked about how to continue to keep this focus up.  Also discussed her upcoming assessment for day treatment and how this will hopefully be helpful with her mood.      The following assessments were completed by patient for this visit:  PHQ9: of note, patient reported she was in a hurry and didn't read all questions, she reports no suicidal ideation  PHQ-9 SCORE 11/29/2022 12/1/2022 12/19/2022 12/21/2022 12/27/2022 12/30/2022 1/5/2023   PHQ-9 Total Score MyChart 8 (Mild depression) 16 (Moderately severe depression) 15 (Moderately severe depression) 15 (Moderately severe depression) 19 (Moderately severe depression) 18 (Moderately severe depression) 16 (Moderately severe depression)   PHQ-9 Total Score 8 16 15 15 19 18 16   Some encounter information is confidential and restricted. Go to Review Flowsheets activity to see all data.     GAD7:   CHAVO-7 SCORE 11/10/2022 12/15/2022 12/15/2022 12/15/2022 1/5/2023 1/5/2023 1/5/2023   Total Score 17 (severe anxiety) - - 19 (severe anxiety) - - 17 (severe anxiety)   Total Score 17 19 19 19 17 17 17   Some  encounter information is confidential and restricted. Go to Review Flowsheets activity to see all data.     PROMIS 10-Global Health (only subscores and total score):   PROMIS-10 Scores Only 12/15/2022 12/27/2022 12/27/2022 12/27/2022 1/5/2023 1/5/2023 1/5/2023   Global Mental Health Score 6 6 6 6 5 5 5   Global Physical Health Score 7 8 8 8 8 8 8   PROMIS TOTAL - SUBSCORES 13 14 14 14 13 13 13   Some encounter information is confidential and restricted. Go to Review Flowsheets activity to see all data.        ASSESSMENT: Current Emotional / Mental Status (status of significant symptoms):   Risk status (Self / Other harm or suicidal ideation)   Patient denies current fears or concerns for personal safety.   Patient denies current or recent suicidal ideation or behaviors.   Patient denies current or recent homicidal ideation or behaviors.   Patient denies current or recent self injurious behavior or ideation.   Patient denies other safety concerns.   Patient reports there has been no change in risk factors since their last session.     Patient reports there has been no change in protective factors since their last session.     A safety and risk management plan has been developed including: Patient consented to co-developed safety plan on 11/24/2020.  Safety and risk management plan was reviewed.   Patient agreed to use safety plan should any safety concerns arise.  A copy was made available to the patient.     Appearance:   Appropriate    Eye Contact:   Good    Psychomotor Behavior: Normal    Attitude:   Cooperative    Orientation:   All   Speech    Rate / Production: Normal/ Responsive    Volume:  Normal    Mood:    Normal   Affect:    Appropriate    Thought Content:  Clear    Thought Form:  Coherent    Insight:    Good      Medication Review:   No changes to current psychiatric medication(s)     Medication Compliance:   Yes     Changes in Health Issues:   None reported     Chemical Use Review:   Substance Use:  Chemical use reviewed, no active concerns identified Nothing used since 2021.     Tobacco Use: No current tobacco use.      Diagnosis:  1. Bipolar 2 disorder (H)    2. Generalized anxiety disorder    3. Trauma and stressor-related disorder      Collateral Reports Completed:   Not Applicable    PLAN: (Patient Tasks / Therapist Tasks / Other)  From the past:  Move towards the bedroom  Reduce soda consumption: 3 a day for the goal  Journal daily  Start to clear out storage locker  Work on budget      There has been demonstrated improvement in functioning while patient has been engaged in psychotherapy/psychological service- if withdrawn the patient would deteriorate and/or relapse.     MICAELA SLADE   2023                                                        ______________________________________________________________________    Individual Treatment Plan    Patient's Name: Randi Cleary  YOB: 1955    Date of Creation: 20  Date Treatment Plan Last Reviewed/Revised: 22    DSM5 Diagnoses: 296.89 Bipolar II Disorder Depressed, 300.02 (F41.1) Generalized Anxiety Disorder or Adjustment Disorders  309.89 (F43.8) Other Specified Trauma and Stressor Related Disorder  Psychosocial / Contextual Factors: Patient's entire family of origin has , she now has a sister-in-law and  as support.  Relationship with  is conflictual. She is recovering from surgeries  PROMIS (reviewed every 90 days): PROMIS-10 Scores  PROMIS 10-Global Health (only subscores and total score):   PROMIS-10 Scores Only 2021 3/15/2022 3/15/2022 3/15/2022 3/24/2022 2022 2022   Global Mental Health Score 6 6 6 6 8 12 12   Global Physical Health Score 9 9 9 9 8 11 10   PROMIS TOTAL - SUBSCORES 15 15 15 15 16 23 22       Referral / Collaboration:  Referral to another professional/service is not indicated at this time..    Anticipated number of session for this episode of care:  "50  Anticipation frequency of session: Biweekly  Anticipated Duration of each session: 53 or more minutes due to intensity of trauma symptoms  Treatment plan will be reviewed in 90 days or when goals have been changed.   There has been demonstrated improvement in functioning while patient has been engaged in psychotherapy/psychological service- if withdrawn the patient would deteriorate and/or relapse.       MeasurableTreatment Goal(s) related to diagnosis / functional impairment(s)  Goal 1: Patient will \"jumpstart, getting going with the things I need to be doing around the house as far as picking up, doing things, trying to do something every day.  Also to lessen the animosity between me and my .\"    I will know I've met my goal when my shoulders are fixed and I can see.      Objective #A (Patient Action)    Patient will increase frequency of engaging her in ADLs.  Status: Continued - Date(s): 12/10/21, 3/9/22, 6/9/22, 9/2/22, 12/1/22    Intervention(s)  Therapist will engage patient in CBT, specifically behavioral activation.    Objective #B  Patient will  track and record at least 5 pleasant exchanges with . Patient will be able to identify at least 5 positive traits about her  and how he relates to her.  Status: Continued - Date(s): 12/10/21, 3/9/22, 6/9/22, 9/2/22, 12/1/22    Intervention(s)  Therapist will teach assertiveness skills and assign homework related to relationship interactions.    Objective #C  Patient will reduce level of depressive and anxious features as evidenced by reduction in score on her CHAVO-7 and PHQ-9 (scores of 15 and 16 at first measurment, respectively).  Status: Continued - Date(s): 12/10/21, 3/9/22, 6/9/22, 9/2/22, 12/1/22    Intervention(s)  Therapist will engage patient in person-centered therapy and CBT.    Patient has reviewed and agreed to the above plan.      MICAELA SLADE  December 1, 2022                                                   Randi RIVERA" "Cathy Cleary          SAFETY PLAN:  Step 1: Warning signs / cues (Thoughts, images, mood, situation, behavior) that a crisis may be developing:  ? Thoughts: \"I don't want to continue\" \"I am unwanted\"  ? Images: none  ? Thinking Processes: ruminating  ? Mood: anger  ? Behaviors: isolating/withdrawing , can't stop crying, not taking care of myself and not taking care of my responsibilities  ? Situations: small triggers, such as not being able to find something, or dropping something   Step 2: Coping strategies - Things I can do to take my mind off of my problems without contacting another person (relaxation technique, physical activity):  ? Distress Tolerance Strategies:  arts and crafts: drawing, play with my pet , listen to positive and upbeat music: any, change body temperature (ice pack/cold water)  and paced breathing/progressive muscle relaxation  ? Physical Activities: go for a walk, deep breathing and stretching   ? Focus on helpful thoughts:  \"You've been through this before, you can get through it again.\"  Step 3: People and social settings that provide distraction:                 Name: Carmen                            Name: Darien                           Name: Aleida       ? pool, shopping, Carmen's house, [employer]       Step 4: Remind myself of people and things that are important to me and worth living for:  Clifford Little Donna, post-COVID world, options of what could be in your future        Step 5: When I am in crisis, I can ask these people to help me use my safety plan:                 Name: Sidney  Step 6: Making the environment safe:   ? go to sleep/daydream  Step 7: Professionals or agencies I can contact during a crisis:  ? [facility] Daytime Number: [phone]  ? Suicide Prevention Lifeline: 8-585-100-TALK (4704)  ? Crisis Text Line Service (available 24 hours a day, 7 days a week): Text [place] to 351531    Local Crisis Services: [facility] Crisis: [phone]     Call 911 or go " to my nearest emergency department.       I helped develop this safety plan and agree to use it when needed.  I have been given a copy of this plan.       Client signature _________________________________________________________________  Today s date:  11/24/2020  Adapted from Safety Plan Template 2008 Randi Poole and Robby Barba is reprinted with the express permission of the authors.  No portion of the Safety Plan Template may be reproduced without the express, written permission.  You can contact the authors at [email or web address] or [email or web address].

## 2023-01-09 ENCOUNTER — HOSPITAL ENCOUNTER (OUTPATIENT)
Dept: BEHAVIORAL HEALTH | Facility: CLINIC | Age: 68
Discharge: HOME | End: 2023-01-09
Attending: FAMILY MEDICINE | Admitting: FAMILY MEDICINE
Payer: MEDICARE

## 2023-01-09 DIAGNOSIS — F41.1 GENERALIZED ANXIETY DISORDER: ICD-10-CM

## 2023-01-09 DIAGNOSIS — F43.9 TRAUMA AND STRESSOR-RELATED DISORDER: ICD-10-CM

## 2023-01-09 DIAGNOSIS — F31.81 BIPOLAR 2 DISORDER (H): ICD-10-CM

## 2023-01-09 PROCEDURE — 90791 PSYCH DIAGNOSTIC EVALUATION: CPT | Mod: PO | Performed by: COUNSELOR

## 2023-01-09 ASSESSMENT — ANXIETY QUESTIONNAIRES
2. NOT BEING ABLE TO STOP OR CONTROL WORRYING: NEARLY EVERY DAY
3. WORRYING TOO MUCH ABOUT DIFFERENT THINGS: MORE THAN HALF THE DAYS
4. TROUBLE RELAXING: NEARLY EVERY DAY
7. FEELING AFRAID AS IF SOMETHING AWFUL MIGHT HAPPEN: MORE THAN HALF THE DAYS
GAD7 TOTAL SCORE: 16
5. BEING SO RESTLESS THAT IT IS HARD TO SIT STILL: SEVERAL DAYS
6. BECOMING EASILY ANNOYED OR IRRITABLE: MORE THAN HALF THE DAYS
1. FEELING NERVOUS, ANXIOUS, OR ON EDGE: NEARLY EVERY DAY

## 2023-01-09 ASSESSMENT — COLUMBIA-SUICIDE SEVERITY RATING SCALE - C-SSRS
3. HAVE YOU BEEN THINKING ABOUT HOW YOU MIGHT KILL YOURSELF?: NO
6. HAVE YOU EVER DONE ANYTHING, STARTED TO DO ANYTHING, OR PREPARED TO DO ANYTHING TO END YOUR LIFE?: YES
4. HAVE YOU HAD THESE THOUGHTS AND HAD SOME INTENTION OF ACTING ON THEM?: NO
2. HAVE YOU ACTUALLY HAD ANY THOUGHTS OF KILLING YOURSELF IN THE PAST MONTH?: NO
5. HAVE YOU STARTED TO WORK OUT OR WORKED OUT THE DETAILS OF HOW TO KILL YOURSELF? DO YOU INTEND TO CARRY OUT THIS PLAN?: NO
1. IN THE PAST MONTH, HAVE YOU WISHED YOU WERE DEAD OR WISHED YOU COULD GO TO SLEEP AND NOT WAKE UP?: YES

## 2023-01-09 NOTE — PROGRESS NOTES
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 was too expensive. At that time, her  wondered why she was taking Adderall? 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 Olivera wouldn't send Dr. Dubois the medical records. Patient stated she was also on Lamictal.    Conduct Disorder: No symptoms  Autism Spectrum Disorder: No symptoms  Obsessive Compulsive Disorder: No Symptoms - Patient stated she scared of fire and could check the stove thousands of time, but doesn't.     Diagnostic Criteria:   Major Depressive Disorder  A) Recurrent episode(s) - symptoms have been present during the same 2-week period and represent a change from previous functioning 5 or more symptoms (required for diagnosis)   - Depressed mood. Note: In children and adolescents, can be irritable mood.     - Diminished interest or pleasure in all, or almost all, activities.    - Decreased sleep.    - Psychomotor activity agitation.    - Fatigue or loss of energy.    - Feelings of worthlessness or inappropriate and excessive guilt.    - Diminished ability to think or concentrate, or indecisiveness.    - Recurrent thoughts of death (not just fear of dying), recurrent suicidal ideation without a specific  "plan, or a suicide attempt or a specific plan for committing suicide.   B) The symptoms cause clinically significant distress or impairment in social, occupational, or other important areas of functioning  C) The episode is not attributable to the physiological effects of a substance or to another medical condition  D) The occurence of major depressive episode is not better explained by other thought / psychotic disorders  E) There has never been a manic episode or hypomanic episode  Generalized Anxiety Disorder  A. Excessive anxiety and worry about a number of events or activities (such as work or school performance).   B. The person finds it difficult to control the worry.  C. Select 3 or more symptoms (required for diagnosis). Only one item is required in children.   - Restlessness or feeling keyed up or on edge.    - Being easily fatigued.    - Difficulty concentrating or mind going blank.    - Irritability.    - Muscle tension.    - Sleep disturbance (difficulty falling or staying asleep, or restless unsatisfying sleep).   D. The focus of the anxiety and worry is not confined to features of an Axis I disorder.  E. The anxiety, worry, or physical symptoms cause clinically significant distress or impairment in social, occupational, or other important areas of functioning.   F. The disturbance is not due to the direct physiological effects of a substance (e.g., a drug of abuse, a medication) or a general medical condition (e.g., hyperthyroidism) and does not occur exclusively during a Mood Disorder, a Psychotic Disorder, or a Pervasive Developmental Disorder.    Functional Status:  Patient reports the following functional impairments:  \"academic performance; chronic disease management; health maintenance; home life; management of the household and or completion of tasks; money management; operation of a motor vehicle; organization; relationship(s); self care; social interactions.\"       Programmatic care:  Current " WHODAS was assigned and patient needs the following level of care based on score 46.    Clinical Summary:  1. Reason for assessment: Patient's [facility] therapist recommended [place] [facility]  2. Psychosocial, Cultural and Contextual Factors: retired, disabled, lives with , few supports  3. Principal DSM5 Diagnoses  (Sustained by DSM5 Criteria Listed Above):   296.33 (F33.2) Major Depressive Disorder, Recurrent Episode, Severe With anxious distress  4. Other Diagnoses that is relevant to services:   300.02 (F41.1) Generalized Anxiety Disorder    5. Provisional Diagnosis:  296.89 Bipolar II Disorder vs. 314.01 (F90.2) Attention-Deficit/Hyperactivity Disorder   Combined presentation   6. Prognosis: Relieve Acute Symptoms and Maintain Current Status / Prevent Deterioration  7. Likely consequences of symptoms if not treated: Patient may need higher level of care  8. Client strengths include:  caring, creative, educated, has a previous history of therapy, insightful, intelligent, motivated and support of family, friends and providers    Recommendations:     1. Plan for Safety and Risk Management: A safety and risk management plan has been developed including: Patient consented to co-developed safety plan.  Safety and risk management plan was completed.  Patient agreed to use safety plan should any safety concerns arise.  Report to child / adult protection services was NA.      2. Patient did not identify concerns with a cultural influence.     3. Initial Treatment will focus on: Depressed Mood, Anxiety.     4. Resources/Service Plan:    services are not indicated.   Modifications to assist communication are not indicated.   Additional disability accommodations are not indicated.      5. Collaboration:  Collaboration / coordination of treatment may be initiated with the following support professionals: [facility] therapist and [facility]     6.  Referrals:   The following referral(s) will be initiated:  55+ [facility]. Next Scheduled Appointment: Patient placed on Admission IOP waitlist. Patient will discuss insurance and cost with .      Emergency Contact:  Sidney Cleary [phone]    Clinical Substantiation/medical necessity for the above recommendations:  Patient's [facility] therapist referred patient to Day Treatment. Patient initially stated she wants groups for both mental and chemical health. Patient denied using alcohol in over 1 year. Patient uses medical marijuana daily, but denied concerns because it calms her. Patient reported diagnoses of anxiety, bipolar, depression and PTSD. Patient's self-reported symptoms of Bipolar and presentation during assessment could be untreated ADHD. Patient was unsure why she had been prescribed Adderall in the past.  recommended [place]+ program due to age, mental health diagnoses, physical health concerns, few supports, and no current reported substance use concerns.    7. MICHAEL:    MICHAEL:  Discussed the general effects of drugs and alcohol on health and well-being. Recommendations:  Continue to abstain from alcohol. Take medications as prescribed. Use medical marijuana as prescribed.     8. Records were reviewed at time of assessment. Information in this assessment was obtained from the medical record and provided by patient who is a fair historian. Patient will have open access to their mental health medical record.    9. Interactive Complexity: No       Provider Name/ Credentials:  LIZBETH Moser, [facility]  January 9, 2023              Outpatient Mental Health Services - Adult    MY COPING PLAN FOR SAFETY    Name: Randi Cleary  YOB: 1955  Date: 1/09/23  My primary care provider: new [place] doctor  My prescriber: Dr. Dubois  Other care team support:  [facility] therapist Mary Kay Weir My Triggers:  Pain, relationship tension with , health.     Additional People, Places, and Things that I can access for  "support: sister in law Vania Shin, therapist Mary Kay Gomez.           What is important to me and makes life worth living: Her  Sidney and 3 good girlfriends she has known for years.  And \"My Clifford,\" her cat.        GREEN    Good Control  1. I feel good  2. No suicidal thoughts   3. Can work, sleep and play      Action Steps  1. Self-care: balanced meals, exercising, sleep practices, etc.  2. Take your medications as prescribed.  3. Continue meetings with therapist and prescriber.  4.  Do the healthy things that I enjoy.           YELLOW  Getting Worse  I have ANY of these:  1. I do not feel good  2. Difficulty Concentrating  3. Sleep is changing  4. Increase/Change in my thoughts to hurt self and/or others, but I can still manage and not act on it.   5. Not taking care of self.             Action Steps (in addition to the above):  1. Inform your therapist and psychiatric prescriber/PCP.  2. Keep taking your medications as prescribed.    3. Turn to people you can ask for help.  4. Use internal coping strategies -see below.  5. Create safe environment: lock and limit medications and notify friends/family of increase in symptoms           RED  Get Help  If I have ANY of these:  1. Current and uncontrollable thoughts and/or behaviors to hurt self and/or others.  Actions to manage my safety  1. Contact your emergency person:  Sidney Cleary [phone]  2. Call my crisis team- [facility] [phone]  3. Or Call 911 or go to the emergency room right away        My Internal Coping Strategies include the following:  She uses music to help herself feel better. Other times, she just tries to work through it with her mind. She will start reading, try to think of other people, or inspirations.     Safety Concerns  How To Identify Situations That Make Your Mental Health Worse:  Triggers are things that make your mental health worse.  Look at the list below to help you find your triggers and what you can do about " them.     1. Identify Early Warning Signs:    Sometimes symptoms return, even when people do their best to stay well. Symptoms can develop over a short period of time with little or no warning, but most of the time they emerge gradually over several weeks.  Early warning signs are changes that people experience when a relapse is starting. Some early warning signs are common and others are not as common.   Common Early Warning Signs:    Feeling tense or nervous, Eating less or eating more, Trouble sleeping -either too much or too little sleep, Feeling depressed or low, Feeling irritable, Feeling like not being around other people, Trouble concentrating, Urges to harm self and Urges to use drugs or alcohol     2. Identify action steps to take when warning signs are noticed:    Taking Action- It is important to take action if you are experiencing early warning signs of a relapse.  The faster you act, the more likely it is that you can avoid a full relapse.  It is helpful to identify several specific ways to cope with symptoms.      The following is my list of symptoms and coping strategies that I can use when they are present:    Symptom Coping Strategies   Anxiety -Talk with someone in your support system and let him or her know how you are feeling.  -Use relaxation techniques such as deep breathing or imagery.  -Use positive affirmations to counteract negative self-talk such as  I am learning to let go of worry.    Depression - Schedule your day; include activities you have to do and activities you enjoy doing.  - Get some exercise - walk, run, bike, or swim.  - Give yourself credit for even the smallest things you get done.   Sleep Difficulties   - Go to sleep at the same time every day.  - Do something relaxing before bed, such as drinking herbal tea or listening to music.  - Avoid having discussions about upsetting topics before going to bed.   Concentration Difficulties - Minimize distractions so there is only  one thing for you to focus on at a time.    - Ask the person you are having a conversation with to slow down or repeat things you are unsure of.

## 2023-01-09 NOTE — PATIENT INSTRUCTIONS
Welcome to [facility] Adult Mental Health Outpatient Programs    Thank you for choosing [facility]!    Congratulations! You have completed the first step in your recovery by participating in a Diagnostic Assessment. With your input, ASHKAN Moser, JUSTIN, is recommending the following level of care and services to best meet your needs.    Managing mental health symptoms while balancing life stressors can be difficult. Our mental health programming will provide the group therapy, education, skills, and support needed to improve your well-being while living a healthy and manageable lifestyle.    All our Adult Mental Health Outpatient Programs are group-based and allow you to meet with peers who are trying to manage similar symptoms and/or life circumstances in a safe and therapeutic setting.    Recommendations and Plan:    Level of Care:  Admission Intensive Outpatient Program     Start Date:  To be determined. Program staff, Ting Perez, will be calling with start date. You may also call her at [phone] or call the program at [phone].     If you were placed on a Wait List following your Diagnostic Assessment, please expect the following:  You will receive a phone call from the program within a few days to discuss a start date and plan.    Please contact the program at the number listed above if you are choosing to be removed from the Wait List, need to reschedule your start or if you have any additional questions.    Type of Participation:  Virtual     We are currently providing 100% online group-based programming. It is a requirement that you be physically in the State of [place] when accessing services. Our providers must be licensed in the state you are located in.      To provide the best group experience for everyone, we expect confidentiality, regular attendance, and respectful participation by all.      Cameras need to be on during groups. We want to see you!   Be sure to be in a private  place where others will not overhear or walk in. Using headphones and making sure your screen is not visible to others are steps you can take to ensure confidentiality.   What is said in group, stays in group. (All personal or identifying information shared in group should be kept confidential and not shared with anyone).  NOTE: Audio or Visual recordings are not allowed and may result in immediate discharge from the program.  Zoom may automatically show your first and last name unless you change it when logging into group. We encourage you to change your name to your first or preferred name. You may also include preferred pronouns.   Please be sitting upright in a comfortable position. This will maximize engagement and participation for everyone.   Please refrain from smoking, eating, driving, or engaging in other distracting activities during groups.  Facilitators may provide reminders of the above expectations during group as needed.    If your camera is off and you are not responding to prompts, facilitators will assume you have left and place you in the waiting room. You will need to request to return to group.    Please do NOT cancel your appointments through [employer].  If you are going to be absent, please contact the program number and leave a message so staff will not expect you.   You will NOT be billed for any sessions you do not attend.    See additional attendance and program participation information below.     Accessing Virtual Groups:    The best way to attend groups is through your [employer] account. Please ask staff if you need assistance setting up an account. [employer] HELPLINE: [phone] or [employer] - Login Page ([email or web address]).  You will also need to download Zoom Download for Windows - Zoom to your computer (preferred), phone or iPad/Tablet devise. It is NOT necessary to log into or set up a Zoom account.  Log into My Chart each day before group.   Go to the  Visits  tab and select the current  date.    You will be asked to verify personal information on the first day or for longer programs, every 30 days. Please allow extra time on your first day to complete this. You will also be asked to complete assessments regularly so we can monitor your progress and address concerns.    The daily invite through [employer] expires 15 minutes after group starts.   You will need to call the program number to request a link to the group if you:   log out of group once it begins   are late to group   get disconnected   are unable to access group for any reason    There is a new link created every day.    Breaks are provided between groups (10 minutes)   Do not log out.  You may mute or pause your video.   The group facilitator may put you in virtual waiting room until the next group starts.        Admission Intensive Outpatient Treatment Program Overview  (Admission-IOP) [phone]      Patients will be scheduled to start the Admission IOP track which meets four days/week. The treatment team will provide education, skills training, and support to build a foundation for a successful treatment experience. The focus is to help orient new patients to programming and prepare them for the best possible outcomes. This track is intended to be short-term, and most patients will participate for approximately one week. Providers will continue to assess your needs, help you set up an individualized treatment plan and, with your input, determine which treatment team and peer group is best for you.       Topics covered include: a review of program expectations and structure, assessing group readiness, introduction to understanding healthy vs. unhealthy coping strategies, experiential mindfulness, and working to identify and create social supports and services. Additional topics will align with those listed in the grid below. All groups are facilitated by a Licensed mental health professional and include the expertise of a Registered  Nurse and Occupational Therapist.       Patients will also see the program Psychiatrist and/or a psychologist on the first or second day of treatment.      Patients will transition to an IOP  home track,  where they will continue their treatment. The  home track  will meet three days per week. See more information in the grid.      Most patients attend the combined IOP services for up to 12-weeks. Time frames may vary throughout this process as patients will transfer when an appropriate placement opportunity is available. Your treatment team will work closely with you for a smooth transition.       [facility] provides several  home track  IOP options. Sometimes patients and/or the treatment team may determine that another service is recommended. Referrals will be offered when appropriate.      Intensive Outpatient Program Overview:   This level of care is less intensive than an inpatient or partial hospitalization program and provides more support than traditional individual psychotherapy.        Length of Stay Typically, patients attend up to 12 weeks of programming, although this can     vary depending on specific patient needs.      Treatment team During the Admission IOP patients will meet with a multi-disciplinary team  including: a psychiatrist, psychotherapist, registered nurse, and occupational     therapist.      When transferred to an IOP  home group , patients will continue groups with a team of licensed mental health professionals including psychotherapists and a psychiatrist and/or psychologist.          Group-based programming 3 groups per day; 3-4 days per week   50 minutes per group   10-minute break between each group   Facilitated by a member of the treatment team      Group Psychotherapy is provided daily, allowing time to check-in, process concerns and share feedback/support. All other groups include a topic and provide opportunities for education, skill building, discussion, and support.        Example   Group   Topics Admission IOP topics are listed above and will align with additional group topics covered throughout the 12-week combined programming.       Topics may include:        Behavior Activation, Cognitive Restructuring: Cognitive Distortions, Communication Skills/ Areas for Self-Improvement. Coping Skills, Discharge Planning, Dimensions of Wellness, Emotions, Forgiveness, Functional Nutrition, Grief, Life Transitions, Leisure Exploration, Life Skills, Listening for Meaning, Medication Assessment, Mental Health Social Support, Mindfulness, Mood Tracking/Triggers, Motivation and Procrastination, Relationship, Relaxation Techniques, Self-Awareness, Self-Confidence, Self-Care, Self-Compassion, Self-Esteem and Self-compassion, Shame and Guilt, Sleep hygiene, SMART Goals, Stages to Wallace with Stress, Stigma, Strategies to Improve Motivation, Symptom Awareness, Time Management, Trauma Triggers, Values, Wellness       Psychiatrist and/or Psychologist Patients will continue to see the program psychiatrist and/or psychologist for follow-up every 30 days or more frequent, as needed.       If seeing the psychiatrist, they will partner with you and your other providers for medication management, as needed.    Psychiatry services will be billed separately.    For insurance purposes, please coordinate with team to prevent scheduling to see the program psychiatrist on the same day you see your outpatient psychiatrist.    If seeing the psychologist, they will provide individual therapy. Medication management will not be provided.       NOTE: Either provider will continue to assess your progress and coordinate with the treatment team to determine when you may be ready for completion.  This is a program requirement.        Individual Therapy See psychologist services above.   Physical Health Screen You will meet with a program nurse within the first week to complete a brief physical health screen. This is a  program requirement.   FMLA or Short-term Disability We encourage you to request completion of paperwork from your long-term providers.   If this is not an option, please notify the program nurse as soon as possible.  We will do our best to help you coordinate completion of paperwork.   Medical Record requests: please contact Release of Information  at [phone] and allow up to 14 days for records once the authorization for release is received.    Treatment and Discharge Planning Patients meet individually with team members for treatment planning.     We will help you develop goals and identify strengths and/or barriers.   We will discuss your program participation, progress, and discharge planning.   We are available to assist with referrals and service coordination; please let us know how best we can support your specific needs.   You will receive copies of your treatment plan and discharge summary via [employer].          An Important Note from your Program Treatment Team...    Welcome! We are happy to be partnering with you on your recovery journey. Our experienced clinicians have developed programming based on current research and evidence-based practice to provide you with high quality mental health treatment.     Attendance and Program Participation:  You will participate in a variety of groups each day. Our goal is to provide you with a rich and varied therapeutic healing environment knowing patients have unique experiences and preferred ways of sharing, learning, processing, and engaging in the recovery process.  You are expected to attend all groups on time.  If you are going to be late or absent, please let a team member know the reason. You can also leave that same information at the number listed above.  In the event of an unreported absence, we will reach out to you. If we are unable to reach you, know that we may call your emergency contact.  We always attempt to establish contact with your emergency  contact prior to initiating a wellness check.  While it is important that you continue to attend appointments with your individual therapist, psychiatrist, and other medical providers, you are encouraged to schedule these appointments outside of group hours whenever possible.  If your attendance becomes a concern, your treatment team will have a discussion with you and may start an Attendance Agreement. Following your Attendance Agreement is required to prevent early discharge from the program.  To get the most out of the program and to support your wellbeing we require that you do not use any chemicals, tobacco or vape products on screen or during program hours. The team is available to assist you if this is something you struggle with.  Please be mindful that you are part of a group; therefore, we ask that you be respectful of other group members' needs and do not use derogatory, offensive, or discriminatory language.  You will be removed from group if suspected of being under the influence of substances or if using language that negatively impacts the group.  Your treatment team will address any concerns with you and offer recommendations. A Behavior Agreement may be started. Following your Behavior Agreement is required to prevent early discharge from the program.  Communication with other group members outside of group is discouraged. This can affect your treatment and the way the group functions.  If you choose to share contact information with group peers AFTER you are discharged from the program, this decision is completely independent of any program coordination or support.  While in the program, participants may not engage in any sexual or intimate relationships with each other outside of group. This may result in immediate discharge of both participants from the program.   Trauma:  Many of our patients have experienced trauma. You are not alone. This can be challenging for patients to manage. All our team  members have been trained in Trauma Informed Care and will provide you with the education, skills training, and support that you need to stabilize your symptoms.  Specific details and descriptions of abuse, assault, violence, neglect, etc., are best processed in individual therapy as to avoid triggering other group members.  Discussing how traumatic experiences have impacted beliefs about self/others/world and practicing skills to cope with symptoms is very appropriate for group therapy.     We look forward to working together to support your mental health.     We love feedback from our patients about our program!  Please take a few moments to respond to surveys sent out by [employer].  This will help us continue to make improvements and to keep the things that are   important to you!

## 2023-01-10 ENCOUNTER — TELEPHONE (OUTPATIENT)
Dept: BEHAVIORAL HEALTH | Facility: CLINIC | Age: 68
End: 2023-01-10

## 2023-01-10 ENCOUNTER — VIRTUAL VISIT (OUTPATIENT)
Dept: PSYCHOLOGY | Facility: CLINIC | Age: 68
End: 2023-01-10
Payer: MEDICARE

## 2023-01-10 DIAGNOSIS — F41.1 GENERALIZED ANXIETY DISORDER: ICD-10-CM

## 2023-01-10 DIAGNOSIS — F43.9 TRAUMA AND STRESSOR-RELATED DISORDER: ICD-10-CM

## 2023-01-10 DIAGNOSIS — F31.81 BIPOLAR 2 DISORDER (H): Primary | ICD-10-CM

## 2023-01-10 PROCEDURE — 90837 PSYTX W PT 60 MINUTES: CPT | Mod: 95 | Performed by: SOCIAL WORKER

## 2023-01-10 ASSESSMENT — PATIENT HEALTH QUESTIONNAIRE - PHQ9
SUM OF ALL RESPONSES TO PHQ QUESTIONS 1-9: 16
10. IF YOU CHECKED OFF ANY PROBLEMS, HOW DIFFICULT HAVE THESE PROBLEMS MADE IT FOR YOU TO DO YOUR WORK, TAKE CARE OF THINGS AT HOME, OR GET ALONG WITH OTHER PEOPLE: EXTREMELY DIFFICULT
SUM OF ALL RESPONSES TO PHQ QUESTIONS 1-9: 16

## 2023-01-10 NOTE — TELEPHONE ENCOUNTER
Writer and patient discussed programming and patient agreed to contact her insurance to find out what is covered. Patient will call writer back at [phone] once she speaks with insurance provider.    Ting Perez [facility] on 1/10/2023 at 1:55 PM

## 2023-01-10 NOTE — PROGRESS NOTES
[facility]                                     Progress Note    Patient Name: Randi Cleary  Date: 1/10/23         Service Type: Individual     Session Start Time: 11:33 AM  Session End Time: 12:28 PM     Session Length: 55 minutes    Session #: 151    Attendees: Client attended alone    Service Modality:  Video Visit:      Provider verified identity through the following two step process.  Patient provided:  Patient is known previously to provider    Telemedicine Visit: The patient's condition can be safely assessed and treated via synchronous audio and visual telemedicine encounter.      Reason for Telemedicine Visit: Patient convenience (e.g. access to timely appointments / distance to available provider)    Originating Site (Patient Location): Patient's home    Distant Site (Provider Location): Provider Remote Setting- Home Office    Consent:  The patient/guardian has verbally consented to: the potential risks and benefits of telemedicine (video visit) versus in person care; bill my insurance or make self-payment for services provided; and responsibility for payment of non-covered services.     Patient would like the video invitation sent by:  My Chart    Mode of Communication:  Video Conference via [facility]    Distant Location (Provider):  Off-site    As the provider I attest to compliance with applicable laws and regulations related to telemedicine.    DATA  Extended Session (53+ minutes): PROLONGED SERVICE IN THE OUTPATIENT SETTING REQUIRING DIRECT (FACE-TO-FACE) PATIENT CONTACT BEYOND THE USUAL SERVICE:    - Patient's presenting concerns require more intensive intervention than could be completed within the usual service  Interactive Complexity: No  Crisis: No        Progress Since Last Session (Related to Symptoms / Goals / Homework):   Symptoms: Patient is continuing to have challenges with her mood    Homework: Partially completed  Move towards the bedroom  Reduce soda  consumption: 3 a day for the goal -3, all at night  Journal daily  Start to clear out storage locker  Work on budget     Episode of Care Goals: Satisfactory progress - ACTION (Actively working towards change); Intervened by reinforcing change plan / affirming steps taken     Current / Ongoing Stressors and Concerns:   Patient is currently socially isolated. She has a conflictual relationship with her .  She is getting minimal physical activity.  She had recent eye surgery and shoulder surgery.     Treatment Objective(s) Addressed in This Session:   Patient will increase frequency of engaging her in ADLs.  Patient will track and record at least 5 pleasant exchanges with . Patient will be able to identify at least 5 positive traits about her .  Patient will reduce level of depressive and anxious features as evidenced by reduction in score on her CHAVO-7 and PHQ-9 (scores of 15 and 16 at first measurment, respectively).     Intervention:   Solution Focused:   Processed patient's appointment for day treatment starting and her decision on this. Processed concerns around this. Talked about some of the action steps she's taking for herself lately, including focusing on some finances and trying to be kind. Also talked about the next steps, including writing two letters regarding some problems she has been having.       The following assessments were completed by patient for this visit:  PHQ9: of note, patient reported she was in a hurry and didn't read all questions, she reports no suicidal ideation  PHQ-9 SCORE 12/21/2022 12/27/2022 12/30/2022 1/5/2023 1/5/2023 1/9/2023 1/10/2023   PHQ-9 Total Score MyChart 15 (Moderately severe depression) 19 (Moderately severe depression) 18 (Moderately severe depression) - 16 (Moderately severe depression) - 16 (Moderately severe depression)   PHQ-9 Total Score 15 19 18 16 16 17 16     GAD7:   CHAVO-7 SCORE 12/15/2022 12/15/2022 1/5/2023 1/5/2023 1/5/2023 1/5/2023  1/9/2023   Total Score - 19 (severe anxiety) - - - 17 (severe anxiety) -   Total Score 19 19 17 17 17 17 16     PROMIS 10-Global Health (only subscores and total score):   PROMIS-10 Scores Only 12/27/2022 12/27/2022 12/27/2022 1/5/2023 1/5/2023 1/5/2023 1/5/2023   Global Mental Health Score 6 6 6 5 5 5 5   Global Physical Health Score 8 8 8 8 8 8 8   PROMIS TOTAL - SUBSCORES 14 14 14 13 13 13 13        ASSESSMENT: Current Emotional / Mental Status (status of significant symptoms):   Risk status (Self / Other harm or suicidal ideation)   Patient denies current fears or concerns for personal safety.   Patient denies current or recent suicidal ideation or behaviors.   Patient denies current or recent homicidal ideation or behaviors.   Patient denies current or recent self injurious behavior or ideation.   Patient denies other safety concerns.   Patient reports there has been no change in risk factors since their last session.     Patient reports there has been no change in protective factors since their last session.     A safety and risk management plan has been developed including: Patient consented to co-developed safety plan on 11/24/2020.  Safety and risk management plan was reviewed.   Patient agreed to use safety plan should any safety concerns arise.  A copy was made available to the patient.     Appearance:   Appropriate    Eye Contact:   Good    Psychomotor Behavior: Normal    Attitude:   Cooperative    Orientation:   All   Speech    Rate / Production: Normal/ Responsive    Volume:  Normal    Mood:    Normal   Affect:    Appropriate    Thought Content:  Clear    Thought Form:  Coherent    Insight:    Good      Medication Review:   No changes to current psychiatric medication(s)     Medication Compliance:   Yes     Changes in Health Issues:   None reported     Chemical Use Review:   Substance Use: Chemical use reviewed, no active concerns identified Nothing used since August 2021.     Tobacco Use: No current  tobacco use.      Diagnosis:  1. Bipolar 2 disorder (H)    2. Generalized anxiety disorder    3. Trauma and stressor-related disorder      Collateral Reports Completed:   Not Applicable    PLAN: (Patient Tasks / Therapist Tasks / Other)  Call insurance regarding day treatment, then call Ting Perez to start day treatment at [phone]    Write down concerns for upcoming appointment  Continue managing your inflammation  Continue working on your finances      There has been demonstrated improvement in functioning while patient has been engaged in psychotherapy/psychological service- if withdrawn the patient would deteriorate and/or relapse.     MICAELA SLADE   January 10, 2023                                                        ______________________________________________________________________    Individual Treatment Plan    Patient's Name: Randi Cleary  YOB: 1955    Date of Creation: 20  Date Treatment Plan Last Reviewed/Revised: 22    DSM5 Diagnoses: 296.89 Bipolar II Disorder Depressed, 300.02 (F41.1) Generalized Anxiety Disorder or Adjustment Disorders  309.89 (F43.8) Other Specified Trauma and Stressor Related Disorder  Psychosocial / Contextual Factors: Patient's entire family of origin has , she now has a sister-in-law and  as support.  Relationship with  is conflictual. She is recovering from surgeries  PROMIS (reviewed every 90 days): PROMIS-10 Scores  PROMIS 10-Global Health (only subscores and total score):   PROMIS-10 Scores Only 2021 3/15/2022 3/15/2022 3/15/2022 3/24/2022 2022 2022   Global Mental Health Score 6 6 6 6 8 12 12   Global Physical Health Score 9 9 9 9 8 11 10   PROMIS TOTAL - SUBSCORES 15 15 15 15 16 23 22       Referral / Collaboration:  Referral to another professional/service is not indicated at this time..    Anticipated number of session for this episode of care: 50  Anticipation frequency of session:  "Biweekly  Anticipated Duration of each session: 53 or more minutes due to intensity of trauma symptoms  Treatment plan will be reviewed in 90 days or when goals have been changed.   There has been demonstrated improvement in functioning while patient has been engaged in psychotherapy/psychological service- if withdrawn the patient would deteriorate and/or relapse.       MeasurableTreatment Goal(s) related to diagnosis / functional impairment(s)  Goal 1: Patient will \"jumpstart, getting going with the things I need to be doing around the house as far as picking up, doing things, trying to do something every day.  Also to lessen the animosity between me and my .\"    I will know I've met my goal when my shoulders are fixed and I can see.      Objective #A (Patient Action)    Patient will increase frequency of engaging her in ADLs.  Status: Continued - Date(s): 12/10/21, 3/9/22, 6/9/22, 9/2/22, 12/1/22    Intervention(s)  Therapist will engage patient in CBT, specifically behavioral activation.    Objective #B  Patient will  track and record at least 5 pleasant exchanges with . Patient will be able to identify at least 5 positive traits about her  and how he relates to her.  Status: Continued - Date(s): 12/10/21, 3/9/22, 6/9/22, 9/2/22, 12/1/22    Intervention(s)  Therapist will teach assertiveness skills and assign homework related to relationship interactions.    Objective #C  Patient will reduce level of depressive and anxious features as evidenced by reduction in score on her CHAVO-7 and PHQ-9 (scores of 15 and 16 at first measurment, respectively).  Status: Continued - Date(s): 12/10/21, 3/9/22, 6/9/22, 9/2/22, 12/1/22    Intervention(s)  Therapist will engage patient in person-centered therapy and CBT.    Patient has reviewed and agreed to the above plan.      MICAELA SLADE  December 1, 2022                                                   Randi Cleary          SAFETY PLAN:  Step 1: " "Warning signs / cues (Thoughts, images, mood, situation, behavior) that a crisis may be developing:  ? Thoughts: \"I don't want to continue\" \"I am unwanted\"  ? Images: none  ? Thinking Processes: ruminating  ? Mood: anger  ? Behaviors: isolating/withdrawing , can't stop crying, not taking care of myself and not taking care of my responsibilities  ? Situations: small triggers, such as not being able to find something, or dropping something   Step 2: Coping strategies - Things I can do to take my mind off of my problems without contacting another person (relaxation technique, physical activity):  ? Distress Tolerance Strategies:  arts and crafts: drawing, play with my pet , listen to positive and upbeat music: any, change body temperature (ice pack/cold water)  and paced breathing/progressive muscle relaxation  ? Physical Activities: go for a walk, deep breathing and stretching   ? Focus on helpful thoughts:  \"You've been through this before, you can get through it again.\"  Step 3: People and social settings that provide distraction:                 Name: Carmen                            Name: Darien                           Name: Aleida       ? pool, shopping, Carmen's house, [employer]       Step 4: Remind myself of people and things that are important to me and worth living for:  Clifford Little Donna, post-COVID world, options of what could be in your future        Step 5: When I am in crisis, I can ask these people to help me use my safety plan:                 Name: Sidney  Step 6: Making the environment safe:   ? go to sleep/daydream  Step 7: Professionals or agencies I can contact during a crisis:  ? [facility] Daytime Number: [phone]  ? Suicide Prevention Lifeline: 8-806-596-TALK (4941)  ? Crisis Text Line Service (available 24 hours a day, 7 days a week): Text [place] to 039231    Local Crisis Services: [facility] Crisis: [phone]     Call 911 or go to my nearest emergency " department.       I helped develop this safety plan and agree to use it when needed.  I have been given a copy of this plan.       Client signature _________________________________________________________________  Today s date:  11/24/2020  Adapted from Safety Plan Template 2008 Randi Poole and Robby Barba is reprinted with the express permission of the authors.  No portion of the Safety Plan Template may be reproduced without the express, written permission.  You can contact the authors at [email or web address] or [email or web address].

## 2023-01-11 ENCOUNTER — OFFICE VISIT (OUTPATIENT)
Dept: INTERNAL MEDICINE | Facility: CLINIC | Age: 68
End: 2023-01-11
Payer: MEDICARE

## 2023-01-11 VITALS
HEIGHT: 66 IN | SYSTOLIC BLOOD PRESSURE: 128 MMHG | HEART RATE: 74 BPM | WEIGHT: 239.3 LBS | DIASTOLIC BLOOD PRESSURE: 80 MMHG | RESPIRATION RATE: 18 BRPM | BODY MASS INDEX: 38.46 KG/M2

## 2023-01-11 DIAGNOSIS — I27.20 PULMONARY HYPERTENSION (H): ICD-10-CM

## 2023-01-11 DIAGNOSIS — E89.0 POSTABLATIVE HYPOTHYROIDISM: ICD-10-CM

## 2023-01-11 DIAGNOSIS — E83.110 HEREDITARY HEMOCHROMATOSIS (H): ICD-10-CM

## 2023-01-11 DIAGNOSIS — F10.21 ALCOHOL USE DISORDER, MODERATE, IN SUSTAINED REMISSION (H): ICD-10-CM

## 2023-01-11 DIAGNOSIS — K21.00 GASTROESOPHAGEAL REFLUX DISEASE WITH ESOPHAGITIS WITHOUT HEMORRHAGE: ICD-10-CM

## 2023-01-11 DIAGNOSIS — J42 CHRONIC BRONCHITIS, UNSPECIFIED CHRONIC BRONCHITIS TYPE (H): ICD-10-CM

## 2023-01-11 DIAGNOSIS — E11.9 TYPE 2 DIABETES MELLITUS WITHOUT COMPLICATION, WITHOUT LONG-TERM CURRENT USE OF INSULIN (H): Primary | ICD-10-CM

## 2023-01-11 DIAGNOSIS — F31.81 BIPOLAR 2 DISORDER (H): ICD-10-CM

## 2023-01-11 DIAGNOSIS — M19.90 ARTHRITIS: ICD-10-CM

## 2023-01-11 DIAGNOSIS — M54.12 CERVICAL RADICULOPATHY: Chronic | ICD-10-CM

## 2023-01-11 PROCEDURE — 99214 OFFICE O/P EST MOD 30 MIN: CPT | Performed by: INTERNAL MEDICINE

## 2023-01-11 RX ORDER — BENZONATATE 200 MG/1
200 CAPSULE ORAL 3 TIMES DAILY PRN
Qty: 20 CAPSULE | Refills: 1 | Status: SHIPPED | OUTPATIENT
Start: 2023-01-11 | End: 2023-03-13

## 2023-01-11 ASSESSMENT — PATIENT HEALTH QUESTIONNAIRE - PHQ9
SUM OF ALL RESPONSES TO PHQ QUESTIONS 1-9: 15
10. IF YOU CHECKED OFF ANY PROBLEMS, HOW DIFFICULT HAVE THESE PROBLEMS MADE IT FOR YOU TO DO YOUR WORK, TAKE CARE OF THINGS AT HOME, OR GET ALONG WITH OTHER PEOPLE: EXTREMELY DIFFICULT
10. IF YOU CHECKED OFF ANY PROBLEMS, HOW DIFFICULT HAVE THESE PROBLEMS MADE IT FOR YOU TO DO YOUR WORK, TAKE CARE OF THINGS AT HOME, OR GET ALONG WITH OTHER PEOPLE: EXTREMELY DIFFICULT
SUM OF ALL RESPONSES TO PHQ QUESTIONS 1-9: 15

## 2023-01-11 ASSESSMENT — PAIN SCALES - GENERAL: PAINLEVEL: NO PAIN (0)

## 2023-01-11 NOTE — ASSESSMENT & PLAN NOTE
Patient is noticing more discomfort and stiffness in her hands, wondering about rheumatoid arthritis. Discussed that her hands do not have classic deformities seen with RA. Offered X-rays, patient will get if she doesn't get them done through [place].   -Check RF, CCP  -Hand X-ray to evaluate for erosions

## 2023-01-11 NOTE — ASSESSMENT & PLAN NOTE
Discovered on [facility] 10/2019. Previously on diuretics, not on med list today. Euvolemic on exam.

## 2023-01-11 NOTE — ASSESSMENT & PLAN NOTE
That is post laminoplasty with Dr. Gregory, now following with pain clinic.  She is still having numbness in her hands that is making it difficult to function.  She did have a CT scan of her cervical spine 12/2022 that showed, changes from surgery but continued C6-C7 severe central canal stenosis.  -We will continue to follow-up with pain and [place]  -Current pain regimen methocarbamol 500 mg TID, Norco 5/325 TID PRN, trileptal 150 mg at bedtime, ropinirole 2-4 mg at bedtime, medical cannabis

## 2023-01-11 NOTE — PROGRESS NOTES
Assessment & Plan   Problem List Items Addressed This Visit        Nervous and Auditory    Cervical radiculopathy (Chronic)     That is post laminoplasty with Dr. Gregory, now following with pain clinic.  She is still having numbness in her hands that is making it difficult to function.  She did have a CT scan of her cervical spine 12/2022 that showed, changes from surgery but continued C6-C7 severe central canal stenosis.  -We will continue to follow-up with pain and [place]  -Current pain regimen methocarbamol 500 mg TID, Norco 5/325 TID PRN, trileptal 150 mg at bedtime, ropinirole 2-4 mg at bedtime, medical cannabis            Respiratory    COPD (chronic obstructive pulmonary disease) (H)     Always with pulmonology.  Current regimen is Trelegy and as needed albuterol.  Patient  inquired about having standing order for antibiotics and prednisone, declined doing this.  Discussed with him that I would like to know when she is feeling ill and will prescribe if appropriate.  They were agreeable to this plan         Relevant Medications    benzonatate (TESSALON) 200 MG capsule       Digestive    Hereditary hemochromatosis (H)     Follows with hematology.  Last phlebotomy in 2022.         Gastroesophageal reflux disease with esophagitis without hemorrhage     Stable on Prilosec.         Relevant Medications    omeprazole (PRILOSEC) 20 MG DR capsule       Endocrine    Postablative hypothyroidism     Follows with endocrine.  Current regimen is Synthroid 150 mcg daily.          Type 2 diabetes mellitus without complication, without long-term current use of insulin (H) - Primary     Off medications.  Due for A1c recheck, ordered today. Due for eye exam, referral placed.          Relevant Orders    Adult Eye  Referral    Basic metabolic panel    Hemoglobin A1c       Circulatory    Pulmonary hypertension (H)     Discovered on [facility] 10/2019. Previously on diuretics, not on med list today. Euvolemic on exam.              Musculoskeletal and Integumentary    Arthritis     Patient is noticing more discomfort and stiffness in her hands, wondering about rheumatoid arthritis. Discussed that her hands do not have classic deformities seen with RA. Offered X-rays, patient will get if she doesn't get them done through [place].   -Check RF, CCP  -Hand X-ray to evaluate for erosions         Relevant Orders    Rheumatoid factor    Cyclic Citrullinated Peptide Antibody IgG    XR Hand Bilateral G/E 3 Views       Behavioral    Bipolar 2 disorder (H)     Follows with therapist every 1 to 2 weeks, and Dr. Dubois for psychiatry.  Currently mood is not well controlled she is working with psychiatry who has recommended she do intensive day treatment.  She is considering this.  She is tearful throughout exam today.  -I strongly encouraged her to proceed with day treatment to help with coping skills  -Follow-up with Dr. Dubois for further medication titration            Other    Alcohol use disorder, moderate, in sustained remission (H)     Remote issue. Not drinking           Depression Screening Follow Up    PHQ 1/11/2023   PHQ-9 Total Score 15   Q9: Thoughts of better off dead/self-harm past 2 weeks Not at all   F/U: Thoughts of suicide or self-harm -   F/U: Self harm-plan -   F/U: Self-harm action -   F/U: Safety concerns -     Follow Up Actions Taken  Crisis resource information provided in After Visit Summary  Referred patient back to current mental health provider.  Strongly recommended patient move forward with day treatment     Return in about 2 months (around 3/11/2023) for Follow up.    Chikis Randle MD  [facility] ALEJANDRA Zhou is a 67 year old accompanied by her spouse, presenting for the following health issues:  Establish Care (New to you)    History of Present Illness   Reason for visit:  Establish care with new internist, current internist Dr. Stockton retired.    B/l occipital  neuralgia: Got ropivicaine and kenalog injection 12/27/22.     Neck Pain: S/p spinal surgery with Dr. Gregory a year ago. Now following with pain clinic. Having numbness in hands that is making it difficult to function. Follows with Dr. Dubois. Current regimen is methocarbamol 500 mg TID, Norco 5/325 TID PRN, trileptal 150 mg at bedtime, ropinirole 2-4 mg at bedtime, medical cannabis    B/l rotator cuff injury: Following at [facility]. Per that doctor, can't do shoulder surgery until severe C6-7 central canal stenosis is fixed.     Bipolar 2 / CHAVO: Follows with therapist (Mary Kay Gomez), psych and Dr. Dubois. Current regimen is hydroxyzine PRN, out of lithium for 3 weeks. Considering day treatment.     T2DM: Off medications, due for A1c check    HTN: S/p removal of benign pheochromocytoma in 2020.  Blood pressures have remained stable    PHTN: Previously on diuretics    Hemochromatosis: Follows with heme, does phlebotomy intermittently (every few years). Last one was in the last year. Will see heme again 1/19/23.     COPD: Trelegy and PRN albuterol, follows with pulm (Dr. Morillo)    Hypothyroidism: 2/2 Graves tx. Synthroid 150 mcg daily. Follows with endo.     Ganglion cyst on R wrist and trigger finger. Will see [facility] on Friday.       She eats 2-3 servings of fruits and vegetables daily.She consumes 0 sweetened beverage(s) daily.She exercises with enough effort to increase her heart rate 9 or less minutes per day.  She exercises with enough effort to increase her heart rate 3 or less days per week.   She is taking medications regularly.    Today's PHQ-9         PHQ-9 Total Score: 15    PHQ-9 Q9 Thoughts of better off dead/self-harm past 2 weeks :   Not at all    How difficult have these problems made it for you to do your work, take care of things at home, or get along with other people: Extremely difficult       Review of Systems   Constitutional, HEENT, cardiovascular, pulmonary, gi and gu systems are negative,  "except as otherwise noted.      Objective    /80 (BP Location: Right arm, Patient Position: Sitting, Cuff Size: Adult Large)   Pulse 74   Resp 18   Ht 1.676 m (5' 6\")   Wt 108.5 kg (239 lb 4.8 oz)   BMI 38.62 kg/m    Body mass index is 38.62 kg/m .  Physical Exam   GENERAL: healthy, alert and no distress  EYES: Eyes grossly normal to inspection and conjunctivae and sclerae normal  HENT: Nose and mouth without ulcers or lesions  NECK: no adenopathy, no asymmetry, masses, or scars and thyroid normal to palpation  RESP: lungs clear to auscultation - no rales, rhonchi or wheezes  CV: regular rate and rhythm, normal S1 S2, no S3 or S4, no murmur, click or rub and peripheral pulses strong  ABDOMEN: soft, nontender, no hepatosplenomegaly, no masses and bowel sounds normal  MS: arthritis of PIP joints noted  SKIN: no suspicious lesions or rashes on exposed skin   NEURO: Normal strength and tone, mentation intact and speech normal  PSYCH: mentation appears normal, affect normal/bright              "

## 2023-01-11 NOTE — ASSESSMENT & PLAN NOTE
Follows with therapist every 1 to 2 weeks, and Dr. Dubois for psychiatry.  Currently mood is not well controlled she is working with psychiatry who has recommended she do intensive day treatment.  She is considering this.  She is tearful throughout exam today.  -I strongly encouraged her to proceed with day treatment to help with coping skills  -Follow-up with Dr. Dubois for further medication titration

## 2023-01-11 NOTE — ASSESSMENT & PLAN NOTE
Always with pulmonology.  Current regimen is Trelegy and as needed albuterol.  Patient  inquired about having standing order for antibiotics and prednisone, declined doing this.  Discussed with him that I would like to know when she is feeling ill and will prescribe if appropriate.  They were agreeable to this plan

## 2023-01-12 ENCOUNTER — TELEPHONE (OUTPATIENT)
Dept: PALLIATIVE MEDICINE | Facility: OTHER | Age: 68
End: 2023-01-12

## 2023-01-12 ENCOUNTER — VIRTUAL VISIT (OUTPATIENT)
Dept: PSYCHOLOGY | Facility: CLINIC | Age: 68
End: 2023-01-12
Payer: MEDICARE

## 2023-01-12 DIAGNOSIS — G95.20 CORD COMPRESSION (H): ICD-10-CM

## 2023-01-12 DIAGNOSIS — F43.9 TRAUMA AND STRESSOR-RELATED DISORDER: ICD-10-CM

## 2023-01-12 DIAGNOSIS — F41.1 GENERALIZED ANXIETY DISORDER: ICD-10-CM

## 2023-01-12 DIAGNOSIS — F31.81 BIPOLAR 2 DISORDER (H): Primary | ICD-10-CM

## 2023-01-12 PROCEDURE — 90837 PSYTX W PT 60 MINUTES: CPT | Mod: 95 | Performed by: SOCIAL WORKER

## 2023-01-12 RX ORDER — HYDROCODONE BITARTRATE AND ACETAMINOPHEN 5; 325 MG/1; MG/1
1 TABLET ORAL 3 TIMES DAILY PRN
Qty: 70 TABLET | Refills: 0 | Status: CANCELLED | OUTPATIENT
Start: 2023-01-12

## 2023-01-12 NOTE — TELEPHONE ENCOUNTER
Received call from patient requesting refill(s) of HYDROcodone-acetaminophen (NORCO) 5-325 MG tablet    Last dispensed from pharmacy on 12.21.2022    Patient's last office/virtual visit by prescribing provider on 11.16.2022  Next office/virtual appointment scheduled for 01.18.2023    UDT/CSA 11.16.2022    E-prescribe to    [id number] [place], [place] - [address]    Will route to nursing [place] for review and preparation of prescription(s).

## 2023-01-12 NOTE — PROGRESS NOTES
[facility]                                     Progress Note    Patient Name: Randi Cleary  Date: 1/12/23         Service Type: Individual     Session Start Time: 1:00 PM  Session End Time: 1:55 PM     Session Length: 55 minutes    Session #: 152    Attendees: Client attended alone    Service Modality:  Video Visit:      Provider verified identity through the following two step process.  Patient provided:  Patient is known previously to provider    Telemedicine Visit: The patient's condition can be safely assessed and treated via synchronous audio and visual telemedicine encounter.      Reason for Telemedicine Visit: Patient convenience (e.g. access to timely appointments / distance to available provider)    Originating Site (Patient Location): Patient's home    Distant Site (Provider Location): Provider Remote Setting- Home Office    Consent:  The patient/guardian has verbally consented to: the potential risks and benefits of telemedicine (video visit) versus in person care; bill my insurance or make self-payment for services provided; and responsibility for payment of non-covered services.     Patient would like the video invitation sent by:  My Chart    Mode of Communication:  [facility] Conference [facility]    Distant Location (Provider):  Off-site    As the provider I attest to compliance with applicable laws and regulations related to telemedicine.    DATA  Extended Session (53+ minutes): PROLONGED SERVICE IN THE OUTPATIENT SETTING REQUIRING DIRECT (FACE-TO-FACE) PATIENT CONTACT BEYOND THE USUAL SERVICE:    - Patient's presenting concerns require more intensive intervention than could be completed within the usual service  Interactive Complexity: No  Crisis: No        Progress Since Last Session (Related to Symptoms / Goals / Homework):   Symptoms: Patient is continuing to have challenges with her mood    Homework: Partially completed  Move towards the bedroom  Reduce soda consumption:  3 a day for the goal -3, all at night  Journal daily  Start to clear out storage locker  Work on budget     Episode of Care Goals: Satisfactory progress - ACTION (Actively working towards change); Intervened by reinforcing change plan / affirming steps taken     Current / Ongoing Stressors and Concerns:   Patient is currently socially isolated. She has a conflictual relationship with her .  She is getting minimal physical activity.  She had recent eye surgery and shoulder surgery.     Treatment Objective(s) Addressed in This Session:   Patient will increase frequency of engaging her in ADLs.  Patient will track and record at least 5 pleasant exchanges with . Patient will be able to identify at least 5 positive traits about her .  Patient will reduce level of depressive and anxious features as evidenced by reduction in score on her CHAVO-7 and PHQ-9 (scores of 15 and 16 at first measurment, respectively).     Intervention:   Solution Focused:   Processed patient's appointment for day treatment starting and her decision on this. Processed concerns around this. Talked about some of the action steps she's taking for herself lately, including focusing on some finances and trying to be kind. Also talked about the next steps, including writing two letters regarding some problems she has been having.       The following assessments were completed by patient for this visit:  PHQ9: of note, patient reported she was in a hurry and didn't read all questions, she reports no suicidal ideation  PHQ-9 SCORE 12/21/2022 12/27/2022 12/30/2022 1/5/2023 1/10/2023 1/11/2023 1/11/2023   PHQ-9 Total Score MyChart 15 (Moderately severe depression) 19 (Moderately severe depression) 18 (Moderately severe depression) 16 (Moderately severe depression) 16 (Moderately severe depression) - 15 (Moderately severe depression)   PHQ-9 Total Score 15 19 18 16 16 15 15   Some encounter information is confidential and restricted. Go to  Review Flowsheets activity to see all data.     GAD7:   CHAVO-7 SCORE 11/10/2022 12/15/2022 12/15/2022 12/15/2022 1/5/2023 1/5/2023 1/5/2023   Total Score 17 (severe anxiety) - - 19 (severe anxiety) - - 17 (severe anxiety)   Total Score 17 19 19 19 17 17 17   Some encounter information is confidential and restricted. Go to Review Flowsheets activity to see all data.     PROMIS 10-Global Health (only subscores and total score):   PROMIS-10 Scores Only 12/15/2022 12/27/2022 12/27/2022 12/27/2022 1/5/2023 1/5/2023 1/5/2023   Global Mental Health Score 6 6 6 6 5 5 5   Global Physical Health Score 7 8 8 8 8 8 8   PROMIS TOTAL - SUBSCORES 13 14 14 14 13 13 13   Some encounter information is confidential and restricted. Go to Review Flowsheets activity to see all data.        ASSESSMENT: Current Emotional / Mental Status (status of significant symptoms):   Risk status (Self / Other harm or suicidal ideation)   Patient denies current fears or concerns for personal safety.   Patient denies current or recent suicidal ideation or behaviors.   Patient denies current or recent homicidal ideation or behaviors.   Patient denies current or recent self injurious behavior or ideation.   Patient denies other safety concerns.   Patient reports there has been no change in risk factors since their last session.     Patient reports there has been no change in protective factors since their last session.     A safety and risk management plan has been developed including: Patient consented to co-developed safety plan on 11/24/2020.  Safety and risk management plan was reviewed.   Patient agreed to use safety plan should any safety concerns arise.  A copy was made available to the patient.     Appearance:   Appropriate    Eye Contact:   Good    Psychomotor Behavior: Normal    Attitude:   Cooperative    Orientation:   All   Speech    Rate / Production: Normal/ Responsive    Volume:  Normal    Mood:    Normal   Affect:    Appropriate    Thought  Content:  Clear    Thought Form:  Coherent    Insight:    Good      Medication Review:   No changes to current psychiatric medication(s)     Medication Compliance:   Yes     Changes in Health Issues:   None reported     Chemical Use Review:   Substance Use: Chemical use reviewed, no active concerns identified Nothing used since 2021.     Tobacco Use: No current tobacco use.      Diagnosis:  1. Bipolar 2 disorder (H)    2. Generalized anxiety disorder    3. Trauma and stressor-related disorder      Collateral Reports Completed:   Not Applicable    PLAN: (Patient Tasks / Therapist Tasks / Other)  Call insurance regarding day treatment, then call Ting Perez to start day treatment at [phone]    Write down concerns for upcoming appointment  Continue managing your inflammation  Continue working on your finances      There has been demonstrated improvement in functioning while patient has been engaged in psychotherapy/psychological service- if withdrawn the patient would deteriorate and/or relapse.     MICAELA SLADE   January 10, 2023                                                        ______________________________________________________________________    Individual Treatment Plan    Patient's Name: Randi Cleary  YOB: 1955    Date of Creation: 20  Date Treatment Plan Last Reviewed/Revised: 22    DSM5 Diagnoses: 296.89 Bipolar II Disorder Depressed, 300.02 (F41.1) Generalized Anxiety Disorder or Adjustment Disorders  309.89 (F43.8) Other Specified Trauma and Stressor Related Disorder  Psychosocial / Contextual Factors: Patient's entire family of origin has , she now has a sister-in-law and  as support.  Relationship with  is conflictual. She is recovering from surgeries  PROMIS (reviewed every 90 days): PROMIS-10 Scores  PROMIS 10-Global Health (only subscores and total score):   PROMIS-10 Scores Only 2021 3/15/2022 3/15/2022 3/15/2022 3/24/2022  "4/1/2022 6/9/2022   Global Mental Health Score 6 6 6 6 8 12 12   Global Physical Health Score 9 9 9 9 8 11 10   PROMIS TOTAL - SUBSCORES 15 15 15 15 16 23 22       Referral / Collaboration:  Referral to another professional/service is not indicated at this time..    Anticipated number of session for this episode of care: 50  Anticipation frequency of session: Biweekly  Anticipated Duration of each session: 53 or more minutes due to intensity of trauma symptoms  Treatment plan will be reviewed in 90 days or when goals have been changed.   There has been demonstrated improvement in functioning while patient has been engaged in psychotherapy/psychological service- if withdrawn the patient would deteriorate and/or relapse.       MeasurableTreatment Goal(s) related to diagnosis / functional impairment(s)  Goal 1: Patient will \"jumpstart, getting going with the things I need to be doing around the house as far as picking up, doing things, trying to do something every day.  Also to lessen the animosity between me and my .\"    I will know I've met my goal when my shoulders are fixed and I can see.      Objective #A (Patient Action)    Patient will increase frequency of engaging her in ADLs.  Status: Continued - Date(s): 12/10/21, 3/9/22, 6/9/22, 9/2/22, 12/1/22    Intervention(s)  Therapist will engage patient in CBT, specifically behavioral activation.    Objective #B  Patient will  track and record at least 5 pleasant exchanges with . Patient will be able to identify at least 5 positive traits about her  and how he relates to her.  Status: Continued - Date(s): 12/10/21, 3/9/22, 6/9/22, 9/2/22, 12/1/22    Intervention(s)  Therapist will teach assertiveness skills and assign homework related to relationship interactions.    Objective #C  Patient will reduce level of depressive and anxious features as evidenced by reduction in score on her CHAVO-7 and PHQ-9 (scores of 15 and 16 at first measurment, " "respectively).  Status: Continued - Date(s): 12/10/21, 3/9/22, 6/9/22, 9/2/22, 12/1/22    Intervention(s)  Therapist will engage patient in person-centered therapy and CBT.    Patient has reviewed and agreed to the above plan.      CRUZ MICAELA BAKER JO  December 1, 2022                                                   Randi Cleary          SAFETY PLAN:  Step 1: Warning signs / cues (Thoughts, images, mood, situation, behavior) that a crisis may be developing:  ? Thoughts: \"I don't want to continue\" \"I am unwanted\"  ? Images: none  ? Thinking Processes: ruminating  ? Mood: anger  ? Behaviors: isolating/withdrawing , can't stop crying, not taking care of myself and not taking care of my responsibilities  ? Situations: small triggers, such as not being able to find something, or dropping something   Step 2: Coping strategies - Things I can do to take my mind off of my problems without contacting another person (relaxation technique, physical activity):  ? Distress Tolerance Strategies:  arts and crafts: drawing, play with my pet , listen to positive and upbeat music: any, change body temperature (ice pack/cold water)  and paced breathing/progressive muscle relaxation  ? Physical Activities: go for a walk, deep breathing and stretching   ? Focus on helpful thoughts:  \"You've been through this before, you can get through it again.\"  Step 3: People and social settings that provide distraction:                 Name: Carmen                            Name: Darien                           Name: Aleida       ? pool, shopping, Carmen's house, [employer]       Step 4: Remind myself of people and things that are important to me and worth living for:  Clifford Little Donna, post-COVID world, options of what could be in your future        Step 5: When I am in crisis, I can ask these people to help me use my safety plan:                 Name: Sidney  Step 6: Making the environment safe:   ? go to sleep/daydream  Step 7: Professionals or " agencies I can contact during a crisis:  ? [facility] Daytime Number: [phone]  ? Suicide Prevention Lifeline: 1-196-180-OBCA (5385)  ? Crisis Text Line Service (available 24 hours a day, 7 days a week): Text [place] to 826094    Local Crisis Services: [facility] Crisis: [phone]     Call 911 or go to my nearest emergency department.       I helped develop this safety plan and agree to use it when needed.  I have been given a copy of this plan.       Client signature _________________________________________________________________  Today s date:  11/24/2020  Adapted from Safety Plan Template 2008 Randi Poole and Robby Barba is reprinted with the express permission of the authors.  No portion of the Safety Plan Template may be reproduced without the express, written permission.  You can contact the authors at [email or web address] or [email or web address].

## 2023-01-18 ENCOUNTER — OFFICE VISIT (OUTPATIENT)
Dept: PALLIATIVE MEDICINE | Facility: OTHER | Age: 68
End: 2023-01-18
Payer: MEDICARE

## 2023-01-18 ENCOUNTER — VIRTUAL VISIT (OUTPATIENT)
Dept: PSYCHOLOGY | Facility: CLINIC | Age: 68
End: 2023-01-18
Payer: MEDICARE

## 2023-01-18 ENCOUNTER — LAB (OUTPATIENT)
Dept: LAB | Facility: CLINIC | Age: 68
End: 2023-01-18
Payer: MEDICARE

## 2023-01-18 VITALS
BODY MASS INDEX: 38.41 KG/M2 | SYSTOLIC BLOOD PRESSURE: 138 MMHG | HEART RATE: 92 BPM | OXYGEN SATURATION: 95 % | DIASTOLIC BLOOD PRESSURE: 79 MMHG | WEIGHT: 238 LBS

## 2023-01-18 DIAGNOSIS — F43.9 TRAUMA AND STRESSOR-RELATED DISORDER: ICD-10-CM

## 2023-01-18 DIAGNOSIS — E11.9 TYPE 2 DIABETES MELLITUS WITHOUT COMPLICATION, WITHOUT LONG-TERM CURRENT USE OF INSULIN (H): ICD-10-CM

## 2023-01-18 DIAGNOSIS — E83.119 HEMOCHROMATOSIS, UNSPECIFIED HEMOCHROMATOSIS TYPE: ICD-10-CM

## 2023-01-18 DIAGNOSIS — G25.81 RESTLESS LEGS SYNDROME (RLS): ICD-10-CM

## 2023-01-18 DIAGNOSIS — F41.1 GENERALIZED ANXIETY DISORDER: ICD-10-CM

## 2023-01-18 DIAGNOSIS — R06.02 SOB (SHORTNESS OF BREATH): ICD-10-CM

## 2023-01-18 DIAGNOSIS — G95.20 CORD COMPRESSION (H): ICD-10-CM

## 2023-01-18 DIAGNOSIS — I50.22 CHRONIC SYSTOLIC HEART FAILURE (H): ICD-10-CM

## 2023-01-18 DIAGNOSIS — M19.90 ARTHRITIS: ICD-10-CM

## 2023-01-18 DIAGNOSIS — F31.81 BIPOLAR 2 DISORDER (H): Primary | ICD-10-CM

## 2023-01-18 DIAGNOSIS — E89.0 POSTABLATIVE HYPOTHYROIDISM: ICD-10-CM

## 2023-01-18 DIAGNOSIS — I27.20 PULMONARY HYPERTENSION (H): ICD-10-CM

## 2023-01-18 DIAGNOSIS — R79.9 ABNORMAL FINDING OF BLOOD CHEMISTRY, UNSPECIFIED: ICD-10-CM

## 2023-01-18 LAB
ALBUMIN SERPL BCG-MCNC: 4.3 G/DL (ref 3.5–5.2)
ALP SERPL-CCNC: 75 U/L (ref 35–104)
ALT SERPL W P-5'-P-CCNC: 18 U/L (ref 10–35)
ANION GAP SERPL CALCULATED.3IONS-SCNC: 13 MMOL/L (ref 7–15)
AST SERPL W P-5'-P-CCNC: 27 U/L (ref 10–35)
BASOPHILS # BLD AUTO: 0 10E3/UL (ref 0–0.2)
BASOPHILS NFR BLD AUTO: 1 %
BILIRUB SERPL-MCNC: 0.4 MG/DL
BUN SERPL-MCNC: 10.8 MG/DL (ref 8–23)
CALCIUM SERPL-MCNC: 9.9 MG/DL (ref 8.8–10.2)
CHLORIDE SERPL-SCNC: 103 MMOL/L (ref 98–107)
CREAT SERPL-MCNC: 0.64 MG/DL (ref 0.51–0.95)
DEPRECATED HCO3 PLAS-SCNC: 25 MMOL/L (ref 22–29)
EOSINOPHIL # BLD AUTO: 0.1 10E3/UL (ref 0–0.7)
EOSINOPHIL NFR BLD AUTO: 1 %
ERYTHROCYTE [DISTWIDTH] IN BLOOD BY AUTOMATED COUNT: 12 % (ref 10–15)
FERRITIN SERPL-MCNC: 75 NG/ML (ref 11–328)
GFR SERPL CREATININE-BSD FRML MDRD: >90 ML/MIN/1.73M2
GLUCOSE SERPL-MCNC: 105 MG/DL (ref 70–99)
HBA1C MFR BLD: 5.6 % (ref 0–5.6)
HCT VFR BLD AUTO: 49.1 % (ref 35–47)
HGB BLD-MCNC: 17.2 G/DL (ref 11.7–15.7)
IMM GRANULOCYTES # BLD: 0 10E3/UL
IMM GRANULOCYTES NFR BLD: 0 %
IRON BINDING CAPACITY (ROCHE): 310 UG/DL (ref 240–430)
IRON SATN MFR SERPL: 60 % (ref 15–46)
IRON SERPL-MCNC: 185 UG/DL (ref 37–145)
LYMPHOCYTES # BLD AUTO: 1.1 10E3/UL (ref 0.8–5.3)
LYMPHOCYTES NFR BLD AUTO: 16 %
MCH RBC QN AUTO: 33.5 PG (ref 26.5–33)
MCHC RBC AUTO-ENTMCNC: 35 G/DL (ref 31.5–36.5)
MCV RBC AUTO: 96 FL (ref 78–100)
MONOCYTES # BLD AUTO: 0.5 10E3/UL (ref 0–1.3)
MONOCYTES NFR BLD AUTO: 7 %
NEUTROPHILS # BLD AUTO: 4.8 10E3/UL (ref 1.6–8.3)
NEUTROPHILS NFR BLD AUTO: 75 %
NT-PROBNP SERPL-MCNC: 126 PG/ML (ref 0–900)
PLATELET # BLD AUTO: 234 10E3/UL (ref 150–450)
POTASSIUM SERPL-SCNC: 4.5 MMOL/L (ref 3.4–5.3)
PROT SERPL-MCNC: 7.1 G/DL (ref 6.4–8.3)
RBC # BLD AUTO: 5.14 10E6/UL (ref 3.8–5.2)
RETICS # AUTO: 0.11 10E6/UL (ref 0.01–0.11)
RETICS/RBC NFR AUTO: 2.1 % (ref 0.8–2.7)
SODIUM SERPL-SCNC: 141 MMOL/L (ref 136–145)
T3 SERPL-MCNC: 114 NG/DL (ref 85–202)
T4 FREE SERPL-MCNC: 1.46 NG/DL (ref 0.9–1.7)
TSH SERPL DL<=0.005 MIU/L-ACNC: 1.58 UIU/ML (ref 0.3–4.2)
WBC # BLD AUTO: 6.4 10E3/UL (ref 4–11)

## 2023-01-18 PROCEDURE — 83036 HEMOGLOBIN GLYCOSYLATED A1C: CPT

## 2023-01-18 PROCEDURE — 84443 ASSAY THYROID STIM HORMONE: CPT | Performed by: PATHOLOGY

## 2023-01-18 PROCEDURE — 83880 ASSAY OF NATRIURETIC PEPTIDE: CPT

## 2023-01-18 PROCEDURE — 86431 RHEUMATOID FACTOR QUANT: CPT

## 2023-01-18 PROCEDURE — G0463 HOSPITAL OUTPT CLINIC VISIT: HCPCS

## 2023-01-18 PROCEDURE — 84480 ASSAY TRIIODOTHYRONINE (T3): CPT

## 2023-01-18 PROCEDURE — 99215 OFFICE O/P EST HI 40 MIN: CPT | Performed by: ANESTHESIOLOGY

## 2023-01-18 PROCEDURE — 83550 IRON BINDING TEST: CPT

## 2023-01-18 PROCEDURE — 90834 PSYTX W PT 45 MINUTES: CPT | Mod: 95 | Performed by: SOCIAL WORKER

## 2023-01-18 PROCEDURE — 36415 COLL VENOUS BLD VENIPUNCTURE: CPT | Performed by: PATHOLOGY

## 2023-01-18 PROCEDURE — 85045 AUTOMATED RETICULOCYTE COUNT: CPT

## 2023-01-18 PROCEDURE — 82728 ASSAY OF FERRITIN: CPT

## 2023-01-18 PROCEDURE — 86200 CCP ANTIBODY: CPT

## 2023-01-18 PROCEDURE — 84443 ASSAY THYROID STIM HORMONE: CPT

## 2023-01-18 PROCEDURE — 80053 COMPREHEN METABOLIC PANEL: CPT

## 2023-01-18 PROCEDURE — 84439 ASSAY OF FREE THYROXINE: CPT

## 2023-01-18 PROCEDURE — 85025 COMPLETE CBC W/AUTO DIFF WBC: CPT | Performed by: PATHOLOGY

## 2023-01-18 PROCEDURE — G0463 HOSPITAL OUTPT CLINIC VISIT: HCPCS | Performed by: ANESTHESIOLOGY

## 2023-01-18 PROCEDURE — 80053 COMPREHEN METABOLIC PANEL: CPT | Performed by: PATHOLOGY

## 2023-01-18 RX ORDER — HYDROCODONE BITARTRATE AND ACETAMINOPHEN 5; 325 MG/1; MG/1
1 TABLET ORAL EVERY 6 HOURS PRN
Qty: 112 TABLET | Refills: 0 | Status: SHIPPED | OUTPATIENT
Start: 2023-01-18 | End: 2023-02-27

## 2023-01-18 RX ORDER — ROPINIROLE 2 MG/1
TABLET, FILM COATED ORAL
Qty: 90 TABLET | Refills: 3 | Status: SHIPPED | OUTPATIENT
Start: 2023-01-18 | End: 2023-04-11

## 2023-01-18 ASSESSMENT — PAIN SCALES - PAIN ENJOYMENT GENERAL ACTIVITY SCALE (PEG)
PEG_TOTALSCORE: 7.33
INTERFERED_ENJOYMENT_LIFE: 8
INTERFERED_GENERAL_ACTIVITY: 7
AVG_PAIN_PASTWEEK: 7

## 2023-01-18 ASSESSMENT — PAIN SCALES - GENERAL: PAINLEVEL: EXTREME PAIN (8)

## 2023-01-18 NOTE — PROGRESS NOTES
Patient presents to the clinic today for a follow up with AXEL WIGGINS MD regarding Pain Management.      UDS/CSA-11.16.2022    Medications-Norco    QUESTIONS:    Lorena Loo  [facility] Visit Facilitator

## 2023-01-18 NOTE — PATIENT INSTRUCTIONS
PLAN:    Crease your hydrocodone to 5 mg 4 times a day.  Will then be due for refill on 1/31.    Change the Requip to 2 mg tablet 1 in the afternoon, 1 at bedtime and 1 when you awake.    Continue the Trileptal 150 mg at bedtime.    You may obtain a grounding mat, see how that does to help with pain sleep and mood.  If you are still struggling with mood may resume the lithium orotate 10 mg at bedtime.    You are working with [facility] to evaluate your neck, shoulder, and hands.    You will be starting a day treatment program.    You will return to the pool as able.    You are seen an endocrinologist with concern for your symptoms and pheochromocytoma    Continue good work on decreasing your soda and following an anti-inflammatory diet.    Follow-up with Dr. Dubois in 2 to 3 months

## 2023-01-18 NOTE — PROGRESS NOTES
[facility] - Office Visit    ASSESSMENT & PLAN     Randi was seen today for pain.    Diagnoses and all orders for this visit:    Cord compression (H)  -     HYDROcodone-acetaminophen (NORCO) 5-325 MG tablet; Take 1 tablet by mouth every 6 hours as needed for breakthrough pain or moderate to severe pain May fill 1/30 for 1/31    Restless legs syndrome (RLS)  -     rOPINIRole (REQUIP) 2 MG tablet; One tab 3 pm, one bedtime, and one during night on waking        Patient Instructions   PLAN:    Crease your hydrocodone to 5 mg 4 times a day.  Will then be due for refill on 1/31.    Change the Requip to 2 mg tablet 1 in the afternoon, 1 at bedtime and 1 when you awake.    Continue the Trileptal 150 mg at bedtime.    You may obtain a grounding mat, see how that does to help with pain sleep and mood.  If you are still struggling with mood may resume the lithium orotate 10 mg at bedtime.    You are working with [facility] to evaluate your neck, shoulder, and hands.    You will be starting a day treatment program.    You will return to the pool as able.    Continue good work on decreasing your soda and following an anti-inflammatory diet.    Follow-up with Dr. Wiggins in 2 to 3 months        -----  AXEL WIGGINS MD  [facility]       SUBJECTIVE      Randi Cleary is a 67 year old year old female who presents to clinic today for the following:     Follow for history of cervical surgery, mood disorder.    Reviewing the record did see a new primary care provider, commented on a CT obtained 12/2 showing ongoing moderate central canal stenosis C6-7 and several levels of foraminal stenosis.    She reviews that he did have an occipital block in December.  Was very helpful for a week.  Some of the symptoms are returning.    Also working with a new physical therapist to agus Curry hoping to address her for stretching the muscles of her neck.    Review she did meet with [facility] to go over pain in her hands.  Demonstrates her left third PIP the extensor tendon slips off.    That surgeon also commented about her's cervical CT.  Winnie reviewed that after having the surgery a year ago, never saw the surgeon in follow-up rather PAs.  Given that there may be some ongoing symptoms with her neck arms and hands she will be seen different surgeon next week.  Also following up with her hands.    Reviews the hydrocodone 5 mg used to be a sufficient 2 times a day occasional 3 times a day.  However having more pain in her hands and neck such she takes 3 and what would tolerate better 4 times a day.  No side effects.  Last urine drug test in November,  reviewed.    She reviews that with restless leg syndrome has usually been taking one of her Requip at bedtime and 1 during the night when she wakes.  She has found however if she would take an extra 1 in the afternoon seems to pair the night to go better would like to increase to 3 times a day.    She finds that some of her \"rage and anger\", are coming in her back that makes her feel familiar from when she had the trouble with a pheochromocytoma.   She is going to follow with the endocrinologist she has seen in the past in that regard.    She will be starting a day treatment program 3 hours a day 3 days a week in February to help with some support for mood.  Continues with stressors.    Had run out of lithium in December, wants to see how she observes off of that.  Continues with the Trileptal at bedtime.    Has been using the medical cannabis flower which she finds helpful particular for relaxation.    Is not yet been back to the pool, felt she was busy with the holidays but has to.    Has a friend who is a retired psych nurse, they are working together on diet such as the anti-inflammatory diet.  She still does continue with 1 can of diet soda a day, we discussed concerns with aspartame.      Current Outpatient Medications:      " acetaminophen (TYLENOL) 325 MG tablet, Take 2 tablets (650 mg) by mouth every 4 hours as needed for other (For optimal non-opioid multimodal pain management to improve pain control.), Disp: , Rfl:      albuterol (PROVENTIL) (2.5 MG/3ML) 0.083% neb solution, Take 1 vial (2.5 mg) by nebulization every 4 hours as needed for shortness of breath / dyspnea or wheezing, Disp: 360 mL, Rfl: 5     albuterol (VENTOLIN HFA) 108 (90 Base) MCG/ACT inhaler, Inhale 2 puffs into the lungs every 6 hours, Disp: 18 g, Rfl: 11     benzonatate (TESSALON) 200 MG capsule, Take 1 capsule (200 mg) by mouth 3 times daily as needed for cough, Disp: 20 capsule, Rfl: 1     Cholecalciferol (VITAMIN D3) 250 MCG (23390 UT) TABS, Take 1 tablet by mouth daily 47838/day, Disp: , Rfl:      Cyanocobalamin (VITAMIN B-12) 5000 MCG SUBL, Place 2-3 sprays under the tongue daily Unknown dose. 2 or 3 sprays/day, Disp: , Rfl:      ethacrynic acid (EDECRIN) 25 MG tablet, Take 1 tablet (25 mg) by mouth every other day, Disp: 45 tablet, Rfl: 3     Fluticasone-Umeclidin-Vilanterol (TRELEGY ELLIPTA) 200-62.5-25 MCG/INH oral inhaler, Inhale 1 puff into the lungs daily, Disp: 4 each, Rfl: 3     HYDROcodone-acetaminophen (NORCO) 5-325 MG tablet, Take 1 tablet by mouth every 6 hours as needed for breakthrough pain or moderate to severe pain May fill 1/30 for 1/31, Disp: 112 tablet, Rfl: 0     hydrOXYzine (ATARAX) 25 MG tablet, Take 1 tablet by mouth daily as needed, Disp: , Rfl:      KLOR-CON 20 MEQ CR tablet, Take 1 tablet (20 mEq total) by mouth 2 (two) times a day., Disp: 180 tablet, Rfl: 0     LITHIUM PO, Take 25 mg by mouth daily OTC lithium oratate, Disp: , Rfl:      magnesium 250 MG tablet, Take 1 tablet by mouth 2 times daily, Disp: , Rfl:      medical cannabis (Patient's own supply), See Admin Instructions (The purpose of this order is to document that the patient reports taking medical cannabis.  This is not a prescription, and is not used to certify that the  patient has a qualifying medical condition.) Flower, Disp: , Rfl:      methocarbamol (ROBAXIN) 500 MG tablet, Take 1 tablet (500 mg) by mouth 3 times daily, Disp: 90 tablet, Rfl: 3     omeprazole (PRILOSEC) 20 MG DR capsule, Take 1 capsule (20 mg) by mouth daily, Disp: 90 capsule, Rfl: 3     OXcarbazepine (TRILEPTAL) 150 MG tablet, Take one tablet daily at bedtime, Disp: 30 tablet, Rfl: 3     rOPINIRole (REQUIP) 2 MG tablet, One tab 3 pm, one bedtime, and one during night on waking, Disp: 90 tablet, Rfl: 3     SYNTHROID 150 MCG tablet, Take 1 tablet (150 mcg) by mouth daily, Disp: 90 tablet, Rfl: 4     vitamin E 400 units TABS, Take 800 Units by mouth daily, Disp: , Rfl:       Review of Systems   General, psych, musculoskeletal, bowels and bladder otherwise normal other than above.          OBJECTIVE   /79   Pulse 92   Wt 108 kg (238 lb)   SpO2 95%   BMI 38.41 kg/m         Physical Exam  General: Alert, clear sensorium.  No respiratory distress.  Ambulates with cane.  Cardiovascular: Normal rate  Lungs: Pulmonary effort is normal, speaking in full sentences  MSK: Exam, left third PIP demonstrates with flexing the finger the extensor tendon slides off laterally.    Her cervical scar area inferiorly on the left is very tender to palpation.  Good deltoid bicep strength.  Hand strength limited by pain, right hand joints with erythema.  Negative Fay's.    Negative straight leg raising, good plantar dorsiflexion bilaterally.  Patellar reflexes equal.  She notes no change in bowel and bladder function  Skin: Warm and dry. No concerning rashes or lesions.  Neurologic: No focal deficit, alert and oriented x3  Psychiatric: Tearful at times, indicates also when her pain medicine wears off she finds that she is more tearful.      Assessment: Chronic pain includes multiple pain generators, did have a cervical surgery, continues with areas of central stenosis and foraminal stenosis, describes tends to have pain  radiating into her jaw and anterior cervical area, the pain coming over the top of her head has responded some to the occipital nerve block.  She is working with orthopedics in this regard.    Comorbid mood disorder includes ongoing stressors.    Comorbid restless leg syndrome.    Plan as above.    Total time more than 50 minutes with moderate complexity decision

## 2023-01-18 NOTE — PROGRESS NOTES
[facility]                                     Progress Note    Patient Name: Randi Cleary  Date: 1/18/23         Service Type: Individual     Session Start Time: 4:03 PM  Session End Time: 4:52 PM     Session Length: 49 minutes    Session #: 153    Attendees: Client attended alone    Service Modality:  Video Visit:      Provider verified identity through the following two step process.  Patient provided:  Patient is known previously to provider    Telemedicine Visit: The patient's condition can be safely assessed and treated via synchronous audio and visual telemedicine encounter.      Reason for Telemedicine Visit: Patient convenience (e.g. access to timely appointments / distance to available provider)    Originating Site (Patient Location): Patient's home    Distant Site (Provider Location): Provider Remote Setting- Home Office    Consent:  The patient/guardian has verbally consented to: the potential risks and benefits of telemedicine (video visit) versus in person care; bill my insurance or make self-payment for services provided; and responsibility for payment of non-covered services.     Patient would like the video invitation sent by:  My Chart    Mode of Communication:  [facility]    Distant Location (Provider):  Off-site    As the provider I attest to compliance with applicable laws and regulations related to telemedicine.    DATA  Extended Session (53+ minutes): PROLONGED SERVICE IN THE OUTPATIENT SETTING REQUIRING DIRECT (FACE-TO-FACE) PATIENT CONTACT BEYOND THE USUAL SERVICE:    - Patient's presenting concerns require more intensive intervention than could be completed within the usual service  Interactive Complexity: No  Crisis: No        Progress Since Last Session (Related to Symptoms / Goals / Homework):   Symptoms: Patient is continuing to have challenges with her mood    Homework: Partially completed  Call insurance regarding day treatment, then call  Ting Perez to start day treatment at [phone]  -called, hasn't heard back  Write down concerns for upcoming appointment -done  Continue managing your inflammation  Continue working on your finances -done     Episode of Care Goals: Satisfactory progress - ACTION (Actively working towards change); Intervened by reinforcing change plan / affirming steps taken     Current / Ongoing Stressors and Concerns:   Patient is currently socially isolated. She has a conflictual relationship with her .  She is getting minimal physical activity.  She had recent eye surgery and shoulder surgery.     Treatment Objective(s) Addressed in This Session:   Patient will increase frequency of engaging her in ADLs.  Patient will track and record at least 5 pleasant exchanges with . Patient will be able to identify at least 5 positive traits about her .  Patient will reduce level of depressive and anxious features as evidenced by reduction in score on her CHAVO-7 and PHQ-9 (scores of 15 and 16 at first measurment, respectively).     Intervention:   Solution Focused:   Processed patient's soda use, which is currently at 1-3 a day, talked about how to manage to continue to reduce this. Talked about financial challenges and steps she's making to take care of this. Processed emotions related to this. Talked about relationship challenges and how they are impacting her.       The following assessments were completed by patient for this visit:  PHQ9: of note, patient reported she was in a hurry and didn't read all questions, she reports no suicidal ideation  PHQ-9 SCORE 12/21/2022 12/27/2022 12/30/2022 1/5/2023 1/10/2023 1/11/2023 1/11/2023   PHQ-9 Total Score MyChart 15 (Moderately severe depression) 19 (Moderately severe depression) 18 (Moderately severe depression) 16 (Moderately severe depression) 16 (Moderately severe depression) - 15 (Moderately severe depression)   PHQ-9 Total Score 15 19 18 16 16 15 15   Some  encounter information is confidential and restricted. Go to Review Flowsheets activity to see all data.     GAD7:   CHAVO-7 SCORE 11/10/2022 12/15/2022 12/15/2022 12/15/2022 1/5/2023 1/5/2023 1/5/2023   Total Score 17 (severe anxiety) - - 19 (severe anxiety) - - 17 (severe anxiety)   Total Score 17 19 19 19 17 17 17   Some encounter information is confidential and restricted. Go to Review Flowsheets activity to see all data.     PROMIS 10-Global Health (only subscores and total score):   PROMIS-10 Scores Only 12/15/2022 12/27/2022 12/27/2022 12/27/2022 1/5/2023 1/5/2023 1/5/2023   Global Mental Health Score 6 6 6 6 5 5 5   Global Physical Health Score 7 8 8 8 8 8 8   PROMIS TOTAL - SUBSCORES 13 14 14 14 13 13 13   Some encounter information is confidential and restricted. Go to Review Flowsheets activity to see all data.        ASSESSMENT: Current Emotional / Mental Status (status of significant symptoms):   Risk status (Self / Other harm or suicidal ideation)   Patient denies current fears or concerns for personal safety.   Patient denies current or recent suicidal ideation or behaviors.   Patient denies current or recent homicidal ideation or behaviors.   Patient denies current or recent self injurious behavior or ideation.   Patient denies other safety concerns.   Patient reports there has been no change in risk factors since their last session.     Patient reports there has been no change in protective factors since their last session.     A safety and risk management plan has been developed including: Patient consented to co-developed safety plan on 11/24/2020.  Safety and risk management plan was reviewed.   Patient agreed to use safety plan should any safety concerns arise.  A copy was made available to the patient.     Appearance:   Appropriate    Eye Contact:   Good    Psychomotor Behavior: Normal    Attitude:   Cooperative    Orientation:   All   Speech    Rate / Production: Normal/  Responsive    Volume:  Normal    Mood:    Normal   Affect:    Appropriate    Thought Content:  Clear    Thought Form:  Coherent    Insight:    Good      Medication Review:   No changes to current psychiatric medication(s)     Medication Compliance:   Yes     Changes in Health Issues:   None reported     Chemical Use Review:   Substance Use: Chemical use reviewed, no active concerns identified Nothing used since 2021.     Tobacco Use: No current tobacco use.      Diagnosis:  1. Bipolar 2 disorder (H)    2. Generalized anxiety disorder    3. Trauma and stressor-related disorder      Collateral Reports Completed:   Not Applicable    PLAN: (Patient Tasks / Therapist Tasks / Other)  Call insurance regarding day treatment, then call Ting Perez to start day treatment at [phone]   Continue managing your inflammation        There has been demonstrated improvement in functioning while patient has been engaged in psychotherapy/psychological service- if withdrawn the patient would deteriorate and/or relapse.     MICAELA SLADE   2023                                                        ______________________________________________________________________    Individual Treatment Plan    Patient's Name: Randi Cleary  YOB: 1955    Date of Creation: 20  Date Treatment Plan Last Reviewed/Revised: 22    DSM5 Diagnoses: 296.89 Bipolar II Disorder Depressed, 300.02 (F41.1) Generalized Anxiety Disorder or Adjustment Disorders  309.89 (F43.8) Other Specified Trauma and Stressor Related Disorder  Psychosocial / Contextual Factors: Patient's entire family of origin has , she now has a sister-in-law and  as support.  Relationship with  is conflictual. She is recovering from surgeries  PROMIS (reviewed every 90 days): PROMIS-10 Scores  PROMIS 10-Global Health (only subscores and total score):   PROMIS-10 Scores Only 2021 3/15/2022 3/15/2022 3/15/2022  "3/24/2022 4/1/2022 6/9/2022   Global Mental Health Score 6 6 6 6 8 12 12   Global Physical Health Score 9 9 9 9 8 11 10   PROMIS TOTAL - SUBSCORES 15 15 15 15 16 23 22       Referral / Collaboration:  Referral to another professional/service is not indicated at this time..    Anticipated number of session for this episode of care: 50  Anticipation frequency of session: Biweekly  Anticipated Duration of each session: 53 or more minutes due to intensity of trauma symptoms  Treatment plan will be reviewed in 90 days or when goals have been changed.   There has been demonstrated improvement in functioning while patient has been engaged in psychotherapy/psychological service- if withdrawn the patient would deteriorate and/or relapse.       MeasurableTreatment Goal(s) related to diagnosis / functional impairment(s)  Goal 1: Patient will \"jumpstart, getting going with the things I need to be doing around the house as far as picking up, doing things, trying to do something every day.  Also to lessen the animosity between me and my .\"    I will know I've met my goal when my shoulders are fixed and I can see.      Objective #A (Patient Action)    Patient will increase frequency of engaging her in ADLs.  Status: Continued - Date(s): 12/10/21, 3/9/22, 6/9/22, 9/2/22, 12/1/22    Intervention(s)  Therapist will engage patient in CBT, specifically behavioral activation.    Objective #B  Patient will  track and record at least 5 pleasant exchanges with . Patient will be able to identify at least 5 positive traits about her  and how he relates to her.  Status: Continued - Date(s): 12/10/21, 3/9/22, 6/9/22, 9/2/22, 12/1/22    Intervention(s)  Therapist will teach assertiveness skills and assign homework related to relationship interactions.    Objective #C  Patient will reduce level of depressive and anxious features as evidenced by reduction in score on her CHAVO-7 and PHQ-9 (scores of 15 and 16 at first " "measurment, respectively).  Status: Continued - Date(s): 12/10/21, 3/9/22, 6/9/22, 9/2/22, 12/1/22    Intervention(s)  Therapist will engage patient in person-centered therapy and CBT.    Patient has reviewed and agreed to the above plan.      MICAELA SLADE  December 1, 2022                                                   Randi Cleary          SAFETY PLAN:  Step 1: Warning signs / cues (Thoughts, images, mood, situation, behavior) that a crisis may be developing:  ? Thoughts: \"I don't want to continue\" \"I am unwanted\"  ? Images: none  ? Thinking Processes: ruminating  ? Mood: anger  ? Behaviors: isolating/withdrawing , can't stop crying, not taking care of myself and not taking care of my responsibilities  ? Situations: small triggers, such as not being able to find something, or dropping something   Step 2: Coping strategies - Things I can do to take my mind off of my problems without contacting another person (relaxation technique, physical activity):  ? Distress Tolerance Strategies:  arts and crafts: drawing, play with my pet , listen to positive and upbeat music: any, change body temperature (ice pack/cold water)  and paced breathing/progressive muscle relaxation  ? Physical Activities: go for a walk, deep breathing and stretching   ? Focus on helpful thoughts:  \"You've been through this before, you can get through it again.\"  Step 3: People and social settings that provide distraction:                 Name: Carmen                            Name: Darien                           Name: Aleida       ? pool, shopping, Carmen's house, [employer]       Step 4: Remind myself of people and things that are important to me and worth living for:  Clifford Little Donna, post-COVID world, options of what could be in your future        Step 5: When I am in crisis, I can ask these people to help me use my safety plan:                 Name: Sidney  Step 6: Making the environment safe:   ? go to sleep/daydream  Step 7: " Professionals or agencies I can contact during a crisis:  ? [facility] Daytime Number: [phone]  ? Suicide Prevention Lifeline: 7-714-629-NXOG (1363)  ? Crisis Text Line Service (available 24 hours a day, 7 days a week): Text [place] to 508271    Local Crisis Services: [facility] Crisis: [phone]     Call 911 or go to my nearest emergency department.       I helped develop this safety plan and agree to use it when needed.  I have been given a copy of this plan.       Client signature _________________________________________________________________  Today s date:  11/24/2020  Adapted from Safety Plan Template 2008 Randi Poole and Robby Barba is reprinted with the express permission of the authors.  No portion of the Safety Plan Template may be reproduced without the express, written permission.  You can contact the authors at [email or web address] or [email or web address].

## 2023-01-18 NOTE — PROGRESS NOTES
Winnie is a 67 year old who is being evaluated via a billable video visit.      How would you like to obtain your AVS? MyChart  If the video visit is dropped, the invitation should be resent by: Send to e-mail at: [email or web address]  Will anyone else be joining your video visit? No    Patient confirms medications and allergies are accurate via patients echeck in completion, and or denies any changes since last reviewed/verified.     Cari Lara, Su Facilitator/LPn      Video-Visit Details    Type of service:  Video Visit   Video Start Time: 1:53 PM  Video End Time:2:07 PM    Originating Location (pt. Location): Home    Distant Location (provider location):  On-site  Platform used for Video Visit: [facility] Progress Note    Outpatient Visit Note:    Patient: Randi Damon  MRN: [id number]  : 1955  YUAN: 2023    Reason for Consultation:  Two copies of the C282Y mutation - hereditary hemochromatosis    Assessment:  In summary, Randi Damon is a 67 year old woman with hereditary hemochromatosis and Past Medical History of KYAW, major depressive disorder, serotonin syndrome, pheochromocytoma,     1. Hereditary hemochromatosis  2. Restless leg syndrome  3. Major depressive disorder  4. Obstructive sleep apnea  5. Polycythemia, likely secondary to to #4    For hereditary hemochromatosis we typically try to remove excess iron by doing serial phlebotomy to reduce iron (ferritin) to goal of 50. However, in Ms. Damon's case, she has severe restless leg and fatigue therefore would permit for target ferritin of 125. Would NOT allow for ferritin to exceed 250 in this patient. Reports she has not had phlebotomy since seeing Dr. Pablo in July.  Ferritin is currently 75.    Ms. Damon has polycythemia. This is NOT related to HH, as iron status is regulated separate from this. This is likely secondary to her untreated KYAW, however if she were to  "develop leukocytosis or thrombocytosis would consider sending evaluation for MPN.  Hemoglobin and hematocrit continue to climb - will review this with Dr. Pablo.          Plan:  1. Majority of today's visit was spent counseling the patient regarding iron status, etc.  2. Labs every 2 months, RTC to see Dr. Pablo in 6 months.  3. Phlebotomy for ferritin >250      29 minutes spent on the date of the encounter doing chart review, review of test results, patient visit and documentation     [facility] [address] [address]  [phone]    ----------------------------------------------------------------------------------------------------------------------    Interval History:  Randi Damon is a 67 year old woman who is homozygous for Hereditary hemochromatosis C282Y mutation with complex medical history who presents for follow-up.    Reports her energy remains low.  She continues to have a lot of pain from her neck, she is anticipating another surgery for this. Thinks her pain may be part of what affects her energy.  Feels like she has noticed a \"copper\" tone to her skin recently, although states her  doesn't see it. Denies abdominal pain, nausea, vomiting, or constipation.  Has occasional diarrhea, likely secondary to her magnesium supplement.      Past Medical History:  Past Medical History:   Diagnosis Date     Anxiety      Bipolar disorder (H)      Breast cancer (H) 1986    lumpectomy, radiation, chemo     Chronic pain syndrome      COPD (chronic obstructive pulmonary disease) (H)     asthma     Cord compression (H) 12/21/2021     Depression, major, recurrent (H)      Dizzy      Drug tolerance     opioid     Esophageal reflux      Fatigue      Graves disease 1994     Hemochromatosis 02/14/2018    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; H63D not detected     Hemochromatosis      History of breast cancer 08/28/2020    Formatting of this note might be different " from the original. Created by Conversion  Replacement Utility updated for latest IMO load Formatting of this note might be different from the original. Created by Conversion  Replacement Utility updated for latest IMO load     History of corticosteroid therapy 11/19/2019     History of partial adrenalectomy (H) 11/19/2019     History of pheochromocytoma 11/19/2019     Hx antineoplastic chemotherapy      Hx of radiation therapy      Hyperlipidaemia      Hypertension      Impaired fasting glucose 2017     Injury of neck, whiplash 07/15/2021     Joint pain      Morbid obesity (H)      KYAW (obstructive sleep apnea) 2016     Osteopenia      Pheochromocytoma, left 08/02/2017    laparoscopically removed     Postablative hypothyroidism 1995     Prediabetes 10/03/2019    by A1c     Psoriasis      Psoriatic arthropathy (H)      Right rotator cuff tear      RLS (restless legs syndrome)     on ropinorole     Sacroiliitis (H)      Serotonin syndrome 08/28/2020    [facility]     Snoring      Spinal stenosis      Status post coronary angiogram 10/03/2019     Urinary incontinence      Vitamin B 12 deficiency 2009     Vitamin D deficiency 2010     Past Surgical History:  Past Surgical History:   Procedure Laterality Date     ARTHRODESIS ANKLE       ARTHROPLASTY ANKLE Right 6/29/2015    Procedure: ARTHROPLASTY ANKLE;  Surgeon: Jason Coughlin MD;  Location: [facility]     ARTHROPLASTY REVISION ANKLE Right 6/29/2015    Procedure: ARTHROPLASTY REVISION ANKLE;  Surgeon: Jason Coughlin MD;  Location: [facility]     BIOPSY BREAST       BREAST BIOPSY, CORE RT/LT       COLONOSCOPY       COLONOSCOPY N/A 2/25/2021    Procedure: COLONOSCOPY;  Surgeon: Guru Elke Tolbert MD;  Location:  GI     CV CORONARY ANGIOGRAM N/A 10/3/2019    Procedure: CV CORONARY ANGIOGRAM;  Surgeon: Bryce Pierre MD;  Location:  HEART CARDIAC CATH LAB     CV RIGHT HEART CATH MEASUREMENTS RECORDED N/A 10/3/2019    Procedure: CV  RIGHT HEART CATH;  Surgeon: Bryce Pierre MD;  Location:  HEART CARDIAC CATH LAB     ESOPHAGOSCOPY, GASTROSCOPY, DUODENOSCOPY (EGD), COMBINED N/A 2/25/2021    Procedure: ESOPHAGOGASTRODUODENOSCOPY, WITH BIOPSY;  Surgeon: Guru Elke Tolbert MD;  Location:  GI     EYE SURGERY  2021     HC REMOVE TONSILS/ADENOIDS,<11 Y/O      Description: Tonsillectomy With Adenoidectomy;  Recorded: 04/07/2010;     IR LUMBAR EPIDURAL STEROID INJECTION  10/26/2004     IR LUMBAR EPIDURAL STEROID INJECTION  11/16/2004     IR LUMBAR EPIDURAL STEROID INJECTION  12/21/2004     IR LUMBAR EPIDURAL STEROID INJECTION  6/8/2006     JOINT REPLACEMENT       LAMINOPLASTY CERVICAL POSTERIOR THREE+ LEVELS Left 12/21/2021    Procedure: CERVICAL 3-CERVICAL 6 LEFT OPEN DOOR LAMINOPLASTY AND LEFT CERVICAL 4-5 AND CERVICAL 6-7 POSTERIOR FORAMINOTOMY;  Surgeon: Angela Gregory MD;  Location: [facility]     LAPAROSCOPIC ADRENALECTOMY Left 08/02/2017    pheochromocytoma     LAPAROSCOPIC ADRENALECTOMY Left 8/2/2017    Procedure: LAPAROSCOPIC LEFT ADRENALECTOMY, ;  Surgeon: Gab Linares MD;  Location: [facility];  Service:      LENGTHEN TENDON ACHILLES Right 6/29/2015    Procedure: LENGTHEN TENDON ACHILLES;  Surgeon: Jason Coughlin MD;  Location: [facility]     LUMPECTOMY BREAST       LUMPECTOMY BREAST Left 1994     MAMMOPLASTY REDUCTION Right 01/13/2015    [facility]     MAMMOPLASTY REDUCTION Right     approx late 2015/early2016     MASTECTOMY      left lumpectomy with axillary node dissection     MASTECTOMY MODIFIED RADICAL       OTHER SURGICAL HISTORY Right     reconstructive breast surgery     OTHER SURGICAL HISTORY      Adrenalectomy for pheochromocytoma     [place] MASTECTOMY, MODIFIED RADICAL      Description: Modified Radical Mastectomy Left Breast;  Recorded: 04/07/2010;     REPAIR HAMMER TOE Right 6/29/2015    Procedure: REPAIR HAMMER TOE;  Surgeon: Jason Coughlin MD;  Location: [facility]      TONSILLECTOMY       TONSILLECTOMY & ADENOIDECTOMY       [facility] ARTHRODESIS,ANKLE,OPEN Right     Description: Ankle Arthrodesis;  Recorded: 04/07/2010;       Medications:  Current Outpatient Medications   Medication Sig Dispense Refill     acetaminophen (TYLENOL) 325 MG tablet Take 2 tablets (650 mg) by mouth every 4 hours as needed for other (For optimal non-opioid multimodal pain management to improve pain control.)       albuterol (PROVENTIL) (2.5 MG/3ML) 0.083% neb solution Take 1 vial (2.5 mg) by nebulization every 4 hours as needed for shortness of breath / dyspnea or wheezing 360 mL 5     albuterol (VENTOLIN HFA) 108 (90 Base) MCG/ACT inhaler Inhale 2 puffs into the lungs every 6 hours 18 g 11     benzonatate (TESSALON) 200 MG capsule Take 1 capsule (200 mg) by mouth 3 times daily as needed for cough 20 capsule 1     Cholecalciferol (VITAMIN D3) 250 MCG (43368 UT) TABS Take 1 tablet by mouth daily 04785/day       Cyanocobalamin (VITAMIN B-12) 5000 MCG SUBL Place 2-3 sprays under the tongue daily Unknown dose. 2 or 3 sprays/day       ethacrynic acid (EDECRIN) 25 MG tablet Take 1 tablet (25 mg) by mouth every other day 45 tablet 3     Fluticasone-Umeclidin-Vilanterol (TRELEGY ELLIPTA) 200-62.5-25 MCG/INH oral inhaler Inhale 1 puff into the lungs daily 4 each 3     HYDROcodone-acetaminophen (NORCO) 5-325 MG tablet Take 1 tablet by mouth every 6 hours as needed for breakthrough pain or moderate to severe pain May fill 1/30 for 1/31 112 tablet 0     hydrOXYzine (ATARAX) 25 MG tablet Take 1 tablet by mouth daily as needed       KLOR-CON 20 MEQ CR tablet Take 1 tablet (20 mEq total) by mouth 2 (two) times a day. 180 tablet 0     LITHIUM PO Take 25 mg by mouth daily OTC lithium oratate       magnesium 250 MG tablet Take 1 tablet by mouth 2 times daily       medical cannabis (Patient's own supply) See Admin Instructions (The purpose of this order is to document that the patient reports taking medical cannabis.  This is  "not a prescription, and is not used to certify that the patient has a qualifying medical condition.)  Flower       methocarbamol (ROBAXIN) 500 MG tablet Take 1 tablet (500 mg) by mouth 3 times daily 90 tablet 3     omeprazole (PRILOSEC) 20 MG DR capsule Take 1 capsule (20 mg) by mouth daily 90 capsule 3     OXcarbazepine (TRILEPTAL) 150 MG tablet Take one tablet daily at bedtime 30 tablet 3     rOPINIRole (REQUIP) 2 MG tablet One tab 3 pm, one bedtime, and one during night on waking 90 tablet 3     SYNTHROID 150 MCG tablet Take 1 tablet (150 mcg) by mouth daily 90 tablet 4     vitamin E 400 units TABS Take 800 Units by mouth daily          Allergies:  Allergies   Allergen Reactions     Serotonin Reuptake Inhibitors Anxiety, Difficulty breathing, Headache, Palpitations and Shortness Of Breath     Buspirone      The patient states she had serotonin syndrome     Cephalexin      Other reaction(s): unknown rxn.     Desvenlafaxine      Serotonin syndrome     Diclofenac Sodium [Diclofenac]      Serotonin syndrome and restless legs syndrome     Gabapentin      Drove on the wrong side of the highway     Levofloxacin      \"CAN'T REMEMBER\"     Penicillins      \"SORES IN MOUTH\"     Riluzole Difficulty breathing and Swelling     Sulfa Drugs      \"PT DOES NOT KNOW WHAT THE REACTION WAS\"       ROS:  A 10 point ROS is negative except as stated in the HPI    Social History:  Denies any tobacco use. Denies any illicit drug use.     Family History:  Reviewed- no interval updates    Video physical exam  General: Patient appears well in no acute distress.   Skin: No visualized rash or lesions on visualized skin  Eyes: EOMI, no erythema, sclera icterus or discharge noted  Resp: Appears to be breathing comfortably without accessory muscle usage, speaking in full sentences, no cough  MSK: Appears to have normal range of motion based on visualized movements  Neurologic: No apparent tremors, facial movements symmetric  Psych: affect bright, " alert and oriented      Labs: personally reviewed with relevant trends annotated below  Ferritin   Date Value Ref Range Status   01/18/2023 75 11 - 328 ng/mL Final   11/04/2022 85 11 - 328 ng/mL Final   07/01/2022 60 8 - 252 ng/mL Final   06/07/2022 125 10 - 130 ng/mL Final   02/23/2022 78 10 - 130 ng/mL Final   11/16/2021 101 8 - 252 ng/mL Final   02/05/2021 48 8 - 252 ng/mL Final   10/30/2020 20 8 - 252 ng/mL Final   02/12/2020 19 8 - 252 ng/mL Final   09/24/2019 33 8 - 252 ng/mL Final   09/19/2019 36 8 - 252 ng/mL Final   09/13/2019 42 8 - 252 ng/mL Final     Iron   Date Value Ref Range Status   01/18/2023 185 (H) 37 - 145 ug/dL Final   11/04/2022 201 (H) 37 - 145 ug/dL Final   07/01/2022 127 35 - 180 ug/dL Final   06/07/2022 216 (H) 35 - 180 ug/dL Final   02/23/2022 145 35 - 180 ug/dL Final   11/16/2021 270 (H) 35 - 180 ug/dL Final   10/01/2021 182 (H) 35 - 180 ug/dL Final   07/13/2021 165 35 - 180 ug/dL Final   02/05/2021 141 35 - 180 ug/dL Final   10/30/2020 52 35 - 180 ug/dL Final   09/24/2019 92 35 - 180 ug/dL Final   08/13/2019 237 (H) 35 - 180 ug/dL Final     WBC   Date Value Ref Range Status   02/05/2021 6.9 4.0 - 11.0 10e9/L Final     WBC Count   Date Value Ref Range Status   01/18/2023 6.4 4.0 - 11.0 10e3/uL Final     Hemoglobin   Date Value Ref Range Status   01/18/2023 17.2 (H) 11.7 - 15.7 g/dL Final   11/04/2022 17.3 (H) 11.7 - 15.7 g/dL Final   08/31/2022 16.7 (H) 11.7 - 15.7 g/dL Final   07/01/2022 15.8 (H) 11.7 - 15.7 g/dL Final   06/07/2022 16.4 (H) 11.7 - 15.7 g/dL Final   02/23/2022 15.4 11.7 - 15.7 g/dL Final   02/05/2021 17.7 (H) 11.7 - 15.7 g/dL Final   10/30/2020 15.4 11.7 - 15.7 g/dL Final   03/09/2020 14.3 11.7 - 15.7 g/dL Final   02/12/2020 14.0 11.7 - 15.7 g/dL Final   10/03/2019 14.6 11.7 - 15.7 g/dL Final   09/24/2019 15.7 11.7 - 15.7 g/dL Final       Imaging:  10/30/20  Average liver iron concentration is 0.7 mg/g dry tissue (normal = 0.17  - 1.8)  Average liver iron concentration is  12 mmol/kg dry tissue (normal = 3  - 33)

## 2023-01-19 ENCOUNTER — VIRTUAL VISIT (OUTPATIENT)
Dept: PSYCHOLOGY | Facility: CLINIC | Age: 68
End: 2023-01-19
Payer: MEDICARE

## 2023-01-19 ENCOUNTER — TELEPHONE (OUTPATIENT)
Dept: BEHAVIORAL HEALTH | Facility: CLINIC | Age: 68
End: 2023-01-19
Payer: MEDICARE

## 2023-01-19 ENCOUNTER — VIRTUAL VISIT (OUTPATIENT)
Dept: ONCOLOGY | Facility: CLINIC | Age: 68
End: 2023-01-19
Attending: INTERNAL MEDICINE
Payer: MEDICARE

## 2023-01-19 ENCOUNTER — BEH TREATMENT PLAN (OUTPATIENT)
Dept: BEHAVIORAL HEALTH | Facility: CLINIC | Age: 68
End: 2023-01-19
Attending: PSYCHIATRY & NEUROLOGY

## 2023-01-19 DIAGNOSIS — D75.1 POLYCYTHEMIA, SECONDARY: ICD-10-CM

## 2023-01-19 DIAGNOSIS — F41.1 GENERALIZED ANXIETY DISORDER: ICD-10-CM

## 2023-01-19 DIAGNOSIS — F43.9 TRAUMA AND STRESSOR-RELATED DISORDER: ICD-10-CM

## 2023-01-19 DIAGNOSIS — E83.119 HEMOCHROMATOSIS, UNSPECIFIED HEMOCHROMATOSIS TYPE: Primary | ICD-10-CM

## 2023-01-19 DIAGNOSIS — F31.81 BIPOLAR 2 DISORDER (H): Primary | ICD-10-CM

## 2023-01-19 LAB
CCP AB SER IA-ACNC: 1.4 U/ML
RHEUMATOID FACT SER NEPH-ACNC: <7 IU/ML

## 2023-01-19 PROCEDURE — 99214 OFFICE O/P EST MOD 30 MIN: CPT | Mod: 95 | Performed by: REGISTERED NURSE

## 2023-01-19 PROCEDURE — 90834 PSYTX W PT 45 MINUTES: CPT | Mod: 95 | Performed by: SOCIAL WORKER

## 2023-01-19 PROCEDURE — G0463 HOSPITAL OUTPT CLINIC VISIT: HCPCS | Mod: PN,GT | Performed by: REGISTERED NURSE

## 2023-01-19 NOTE — PROGRESS NOTES
[facility]                                     Progress Note    Patient Name: Randi Cleary  Date: 1/19/23         Service Type: Individual     Session Start Time: 11:35 AM  Session End Time: 12:24 PM     Session Length: 49 minutes    Session #: 154    Attendees: Client attended alone    Service Modality:  Video Visit:      Provider verified identity through the following two step process.  Patient provided:  Patient is known previously to provider    Telemedicine Visit: The patient's condition can be safely assessed and treated via synchronous audio and visual telemedicine encounter.      Reason for Telemedicine Visit: Patient convenience (e.g. access to timely appointments / distance to available provider)    Originating Site (Patient Location): Patient's home    Distant Site (Provider Location): Provider Remote Setting- Home Office    Consent:  The patient/guardian has verbally consented to: the potential risks and benefits of telemedicine (video visit) versus in person care; bill my insurance or make self-payment for services provided; and responsibility for payment of non-covered services.     Patient would like the video invitation sent by:  My Chart    Mode of Communication:  [facility]    Distant Location (Provider):  Off-site    As the provider I attest to compliance with applicable laws and regulations related to telemedicine.    DATA  Extended Session (53+ minutes): No  Interactive Complexity: No  Crisis: No        Progress Since Last Session (Related to Symptoms / Goals / Homework):   Symptoms: Patient is continuing to have challenges with her mood    Homework: Partially completed  Call insurance regarding day treatment, then call Ting Perez to start day treatment at [phone]  -done  Continue managing your inflammation -done     Episode of Care Goals: Satisfactory progress - ACTION (Actively working towards change); Intervened by reinforcing change plan /  affirming steps taken     Current / Ongoing Stressors and Concerns:   Patient is currently socially isolated. She has a conflictual relationship with her .  She is getting minimal physical activity.  She had recent eye surgery and shoulder surgery.     Treatment Objective(s) Addressed in This Session:   Patient will increase frequency of engaging her in ADLs.  Patient will track and record at least 5 pleasant exchanges with . Patient will be able to identify at least 5 positive traits about her .  Patient will reduce level of depressive and anxious features as evidenced by reduction in score on her CHAVO-7 and PHQ-9 (scores of 15 and 16 at first measurment, respectively).     Intervention:   Solution Focused:   Processed upcoming IOP start date and feelings and concerns around this. Talked about prioritizing this, despite feeling like she has other things she'd like to do, as many of these things have been on her to do list for over a year, and she may find more success and then after having sought treatment for her mental health.  Discussed ongoing challenges and ways she is caring for herself.      The following assessments were completed by patient for this visit:  PHQ9: of note, patient reported she was in a hurry and didn't read all questions, she reports no suicidal ideation  PHQ-9 SCORE 12/21/2022 12/27/2022 12/30/2022 1/5/2023 1/10/2023 1/11/2023 1/11/2023   PHQ-9 Total Score MyChart 15 (Moderately severe depression) 19 (Moderately severe depression) 18 (Moderately severe depression) 16 (Moderately severe depression) 16 (Moderately severe depression) - 15 (Moderately severe depression)   PHQ-9 Total Score 15 19 18 16 16 15 15   Some encounter information is confidential and restricted. Go to Review Flowsheets activity to see all data.     GAD7:   CHAVO-7 SCORE 11/10/2022 12/15/2022 12/15/2022 12/15/2022 1/5/2023 1/5/2023 1/5/2023   Total Score 17 (severe anxiety) - - 19 (severe anxiety) - -  17 (severe anxiety)   Total Score 17 19 19 19 17 17 17   Some encounter information is confidential and restricted. Go to Review Flowsheets activity to see all data.     PROMIS 10-Global Health (only subscores and total score):   PROMIS-10 Scores Only 12/15/2022 12/27/2022 12/27/2022 12/27/2022 1/5/2023 1/5/2023 1/5/2023   Global Mental Health Score 6 6 6 6 5 5 5   Global Physical Health Score 7 8 8 8 8 8 8   PROMIS TOTAL - SUBSCORES 13 14 14 14 13 13 13   Some encounter information is confidential and restricted. Go to Review Flowsheets activity to see all data.        ASSESSMENT: Current Emotional / Mental Status (status of significant symptoms):   Risk status (Self / Other harm or suicidal ideation)   Patient denies current fears or concerns for personal safety.   Patient denies current or recent suicidal ideation or behaviors.   Patient denies current or recent homicidal ideation or behaviors.   Patient denies current or recent self injurious behavior or ideation.   Patient denies other safety concerns.   Patient reports there has been no change in risk factors since their last session.     Patient reports there has been no change in protective factors since their last session.     A safety and risk management plan has been developed including: Patient consented to co-developed safety plan on 11/24/2020.  Safety and risk management plan was reviewed.   Patient agreed to use safety plan should any safety concerns arise.  A copy was made available to the patient.     Appearance:   Appropriate    Eye Contact:   Good    Psychomotor Behavior: Normal    Attitude:   Cooperative    Orientation:   All   Speech    Rate / Production: Normal/ Responsive    Volume:  Normal    Mood:    Normal   Affect:    Appropriate    Thought Content:  Clear    Thought Form:  Coherent    Insight:    Good      Medication Review:   No changes to current psychiatric medication(s)     Medication Compliance:   Yes     Changes in Health  Issues:   None reported     Chemical Use Review:   Substance Use: Chemical use reviewed, no active concerns identified Nothing used since 2021.     Tobacco Use: No current tobacco use.      Diagnosis:  1. Bipolar 2 disorder (H)    2. Generalized anxiety disorder    3. Trauma and stressor-related disorder      Collateral Reports Completed:   Not Applicable    PLAN: (Patient Tasks / Therapist Tasks / Other)  Call insurance regarding day treatment, then call Ting Perez to start day treatment at [phone]   Continue managing your inflammation        There has been demonstrated improvement in functioning while patient has been engaged in psychotherapy/psychological service- if withdrawn the patient would deteriorate and/or relapse.     MICAELA SLADE   2023                                                        ______________________________________________________________________    Individual Treatment Plan    Patient's Name: Randi Cleary  YOB: 1955    Date of Creation: 20  Date Treatment Plan Last Reviewed/Revised: 22    DSM5 Diagnoses: 296.89 Bipolar II Disorder Depressed, 300.02 (F41.1) Generalized Anxiety Disorder or Adjustment Disorders  309.89 (F43.8) Other Specified Trauma and Stressor Related Disorder  Psychosocial / Contextual Factors: Patient's entire family of origin has , she now has a sister-in-law and  as support.  Relationship with  is conflictual. She is recovering from surgeries  PROMIS (reviewed every 90 days): PROMIS-10 Scores  PROMIS 10-Global Health (only subscores and total score):   PROMIS-10 Scores Only 2021 3/15/2022 3/15/2022 3/15/2022 3/24/2022 2022 2022   Global Mental Health Score 6 6 6 6 8 12 12   Global Physical Health Score 9 9 9 9 8 11 10   PROMIS TOTAL - SUBSCORES 15 15 15 15 16 23 22       Referral / Collaboration:  Referral to another professional/service is not indicated at this  "time..    Anticipated number of session for this episode of care: 50  Anticipation frequency of session: Biweekly  Anticipated Duration of each session: 53 or more minutes due to intensity of trauma symptoms  Treatment plan will be reviewed in 90 days or when goals have been changed.   There has been demonstrated improvement in functioning while patient has been engaged in psychotherapy/psychological service- if withdrawn the patient would deteriorate and/or relapse.       MeasurableTreatment Goal(s) related to diagnosis / functional impairment(s)  Goal 1: Patient will \"jumpstart, getting going with the things I need to be doing around the house as far as picking up, doing things, trying to do something every day.  Also to lessen the animosity between me and my .\"    I will know I've met my goal when my shoulders are fixed and I can see.      Objective #A (Patient Action)    Patient will increase frequency of engaging her in ADLs.  Status: Continued - Date(s): 12/10/21, 3/9/22, 6/9/22, 9/2/22, 12/1/22    Intervention(s)  Therapist will engage patient in CBT, specifically behavioral activation.    Objective #B  Patient will  track and record at least 5 pleasant exchanges with . Patient will be able to identify at least 5 positive traits about her  and how he relates to her.  Status: Continued - Date(s): 12/10/21, 3/9/22, 6/9/22, 9/2/22, 12/1/22    Intervention(s)  Therapist will teach assertiveness skills and assign homework related to relationship interactions.    Objective #C  Patient will reduce level of depressive and anxious features as evidenced by reduction in score on her CHAVO-7 and PHQ-9 (scores of 15 and 16 at first measurment, respectively).  Status: Continued - Date(s): 12/10/21, 3/9/22, 6/9/22, 9/2/22, 12/1/22    Intervention(s)  Therapist will engage patient in person-centered therapy and CBT.    Patient has reviewed and agreed to the above plan.      MICAELA SLADE  December 1, " "2022                                                   Randi Cleary          SAFETY PLAN:  Step 1: Warning signs / cues (Thoughts, images, mood, situation, behavior) that a crisis may be developing:  ? Thoughts: \"I don't want to continue\" \"I am unwanted\"  ? Images: none  ? Thinking Processes: ruminating  ? Mood: anger  ? Behaviors: isolating/withdrawing , can't stop crying, not taking care of myself and not taking care of my responsibilities  ? Situations: small triggers, such as not being able to find something, or dropping something   Step 2: Coping strategies - Things I can do to take my mind off of my problems without contacting another person (relaxation technique, physical activity):  ? Distress Tolerance Strategies:  arts and crafts: drawing, play with my pet , listen to positive and upbeat music: any, change body temperature (ice pack/cold water)  and paced breathing/progressive muscle relaxation  ? Physical Activities: go for a walk, deep breathing and stretching   ? Focus on helpful thoughts:  \"You've been through this before, you can get through it again.\"  Step 3: People and social settings that provide distraction:                 Name: Carmen                            Name: Darien                           Name: Aleida       ? pool, shopping, Carmen's house, [employer]       Step 4: Remind myself of people and things that are important to me and worth living for:  Clifford Little Donna, post-COVID world, options of what could be in your future        Step 5: When I am in crisis, I can ask these people to help me use my safety plan:                 Name: Sidney  Step 6: Making the environment safe:   ? go to sleep/daydream  Step 7: Professionals or agencies I can contact during a crisis:  ? [facility] Daytime Number: [phone]  ? Suicide Prevention Lifeline: 5-088-905-POVC (4426)  ? Crisis Text Line Service (available 24 hours a day, 7 days a week): Text [place] to 480297    Local Crisis " Services: [facility] Crisis: [phone]     Call 911 or go to my nearest emergency department.       I helped develop this safety plan and agree to use it when needed.  I have been given a copy of this plan.       Client signature _________________________________________________________________  Today s date:  11/24/2020  Adapted from Safety Plan Template 2008 Randi Poole and Robby Barba is reprinted with the express permission of the authors.  No portion of the Safety Plan Template may be reproduced without the express, written permission.  You can contact the authors at [email or web address] or [email or web address].

## 2023-01-19 NOTE — LETTER
2023         RE: Randi Cleary  [address] [facility] [address]        Dear Colleague,    Thank you for referring your patient, Randi Cleary, to the [facility]. Please see a copy of my visit note below.    Winnie is a 67 year old who is being evaluated via a billable video visit.      How would you like to obtain your AVS? MyChart  If the video visit is dropped, the invitation should be resent by: Send to e-mail at: [email or web address]  Will anyone else be joining your video visit? No    Patient confirms medications and allergies are accurate via patients echeck in completion, and or denies any changes since last reviewed/verified.     Cari Lara, Virtual Facilitator/LPn      Video-Visit Details    Type of service:  Video Visit   Video Start Time: 1:53 PM  Video End Time:2:07 PM    Originating Location (pt. Location): Home    Distant Location (provider location):  On-site  Platform used for Video Visit: [facility] Hematology Progress Note    Outpatient Visit Note:    Patient: Randi Damon  MRN: [id number]  : 1955  YUAN: 2023    Reason for Consultation:  Two copies of the C282Y mutation - hereditary hemochromatosis    Assessment:  In summary, Randi Damon is a 67 year old woman with hereditary hemochromatosis and Past Medical History of KYAW, major depressive disorder, serotonin syndrome, pheochromocytoma,     1. Hereditary hemochromatosis  2. Restless leg syndrome  3. Major depressive disorder  4. Obstructive sleep apnea  5. Polycythemia, likely secondary to to #4    For hereditary hemochromatosis we typically try to remove excess iron by doing serial phlebotomy to reduce iron (ferritin) to goal of 50. However, in Ms. Damon's case, she has severe restless leg and fatigue therefore would permit for target ferritin of 125. Would NOT allow for ferritin to exceed 250 in this patient. Reports  "she has not had phlebotomy since seeing Dr. Pablo in July.  Ferritin is currently 75.    Ms. Damon has polycythemia. This is NOT related to HH, as iron status is regulated separate from this. This is likely secondary to her untreated KYAW, however if she were to develop leukocytosis or thrombocytosis would consider sending evaluation for MPN.  Hemoglobin and hematocrit continue to climb - will review this with Dr. Pablo.          Plan:  1. Majority of today's visit was spent counseling the patient regarding iron status, etc.  2. Labs every 2 months, RTC to see Dr. Pablo in 6 months.  3. Phlebotomy for ferritin >250      29 minutes spent on the date of the encounter doing chart review, review of test results, patient visit and documentation     DION Thompson [facility] [address] [address]  [phone]    ----------------------------------------------------------------------------------------------------------------------    Interval History:  Randi Damon is a 67 year old woman who is homozygous for Hereditary hemochromatosis C282Y mutation with complex medical history who presents for follow-up.    Reports her energy remains low.  She continues to have a lot of pain from her neck, she is anticipating another surgery for this. Thinks her pain may be part of what affects her energy.  Feels like she has noticed a \"copper\" tone to her skin recently, although states her  doesn't see it. Denies abdominal pain, nausea, vomiting, or constipation.  Has occasional diarrhea, likely secondary to her magnesium supplement.      Past Medical History:  Past Medical History:   Diagnosis Date     Anxiety      Bipolar disorder (H)      Breast cancer (H) 1986    lumpectomy, radiation, chemo     Chronic pain syndrome      COPD (chronic obstructive pulmonary disease) (H)     asthma     Cord compression (H) 12/21/2021     Depression, major, recurrent (H)      Dizzy  "     Drug tolerance     opioid     Esophageal reflux      Fatigue      Graves disease 1994     Hemochromatosis 02/14/2018    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 Created by Conversion  Replacement Utility updated for latest IMO load Formatting of this note might be different from the original. Created by Conversion  Replacement Utility updated for latest IMO load     History of corticosteroid therapy 11/19/2019     History of partial adrenalectomy (H) 11/19/2019     History of pheochromocytoma 11/19/2019     Hx antineoplastic chemotherapy      Hx of radiation therapy      Hyperlipidaemia      Hypertension      Impaired fasting glucose 2017     Injury of neck, whiplash 07/15/2021     Joint pain      Morbid obesity (H)      KYAW (obstructive sleep apnea) 2016     Osteopenia      Pheochromocytoma, left 08/02/2017    laparoscopically removed     Postablative hypothyroidism 1995     Prediabetes 10/03/2019    by A1c     Psoriasis      Psoriatic arthropathy (H)      Right rotator cuff tear      RLS (restless legs syndrome)     on ropinorole     Sacroiliitis (H)      Serotonin syndrome 08/28/2020    [facility]     Snoring      Spinal stenosis      Status post coronary angiogram 10/03/2019     Urinary incontinence      Vitamin B 12 deficiency 2009     Vitamin D deficiency 2010     Past Surgical History:  Past Surgical History:   Procedure Laterality Date     ARTHRODESIS ANKLE       ARTHROPLASTY ANKLE Right 6/29/2015    Procedure: ARTHROPLASTY ANKLE;  Surgeon: Jason Coughlin MD;  Location: [facility]     ARTHROPLASTY REVISION ANKLE Right 6/29/2015    Procedure: ARTHROPLASTY REVISION ANKLE;  Surgeon: Jason Coughlin MD;  Location:  [facility]     BIOPSY BREAST       BREAST BIOPSY, CORE RT/LT       COLONOSCOPY       COLONOSCOPY N/A 2/25/2021    Procedure: COLONOSCOPY;  Surgeon: Guru Didi  Elke Arriaga MD;  Location:  GI     CV CORONARY ANGIOGRAM N/A 10/3/2019    Procedure: CV CORONARY ANGIOGRAM;  Surgeon: Bryce Pierre MD;  Location:  HEART CARDIAC CATH LAB     CV RIGHT HEART CATH MEASUREMENTS RECORDED N/A 10/3/2019    Procedure: CV RIGHT HEART CATH;  Surgeon: Bryce Pierre MD;  Location:  HEART CARDIAC CATH LAB     ESOPHAGOSCOPY, GASTROSCOPY, DUODENOSCOPY (EGD), COMBINED N/A 2/25/2021    Procedure: ESOPHAGOGASTRODUODENOSCOPY, WITH BIOPSY;  Surgeon: Guru Elke Tolbert MD;  Location:  GI     EYE SURGERY  2021     HC REMOVE TONSILS/ADENOIDS,<13 Y/O      Description: Tonsillectomy With Adenoidectomy;  Recorded: 04/07/2010;     IR LUMBAR EPIDURAL STEROID INJECTION  10/26/2004     IR LUMBAR EPIDURAL STEROID INJECTION  11/16/2004     IR LUMBAR EPIDURAL STEROID INJECTION  12/21/2004     IR LUMBAR EPIDURAL STEROID INJECTION  6/8/2006     JOINT REPLACEMENT       LAMINOPLASTY CERVICAL POSTERIOR THREE+ LEVELS Left 12/21/2021    Procedure: CERVICAL 3-CERVICAL 6 LEFT OPEN DOOR LAMINOPLASTY AND LEFT CERVICAL 4-5 AND CERVICAL 6-7 POSTERIOR FORAMINOTOMY;  Surgeon: Angela Gregory MD;  Location: [facility]     LAPAROSCOPIC ADRENALECTOMY Left 08/02/2017    pheochromocytoma     LAPAROSCOPIC ADRENALECTOMY Left 8/2/2017    Procedure: LAPAROSCOPIC LEFT ADRENALECTOMY, ;  Surgeon: Gab Linares MD;  Location: [facility];  Service:      LENGTHEN TENDON ACHILLES Right 6/29/2015    Procedure: LENGTHEN TENDON ACHILLES;  Surgeon: Jason Coughlin MD;  Location: [facility]     LUMPECTOMY BREAST       LUMPECTOMY BREAST Left 1994     MAMMOPLASTY REDUCTION Right 01/13/2015    [place]     MAMMOPLASTY REDUCTION Right     approx late 2015/early2016     MASTECTOMY      left lumpectomy with axillary node dissection     MASTECTOMY MODIFIED RADICAL       OTHER SURGICAL HISTORY Right     reconstructive breast surgery     OTHER SURGICAL HISTORY      Adrenalectomy for  pheochromocytoma     NC MASTECTOMY, MODIFIED RADICAL      Description: Modified Radical Mastectomy Left Breast;  Recorded: 04/07/2010;     REPAIR HAMMER TOE Right 6/29/2015    Procedure: REPAIR HAMMER TOE;  Surgeon: Jason Coughlin MD;  [facility]     TONSILLECTOMY       TONSILLECTOMY & ADENOIDECTOMY       RUST ARTHRODESIS,ANKLE,OPEN Right     Description: Ankle Arthrodesis;  Recorded: 04/07/2010;       Medications:  Current Outpatient Medications   Medication Sig Dispense Refill     acetaminophen (TYLENOL) 325 MG tablet Take 2 tablets (650 mg) by mouth every 4 hours as needed for other (For optimal non-opioid multimodal pain management to improve pain control.)       albuterol (PROVENTIL) (2.5 MG/3ML) 0.083% neb solution Take 1 vial (2.5 mg) by nebulization every 4 hours as needed for shortness of breath / dyspnea or wheezing 360 mL 5     albuterol (VENTOLIN HFA) 108 (90 Base) MCG/ACT inhaler Inhale 2 puffs into the lungs every 6 hours 18 g 11     benzonatate (TESSALON) 200 MG capsule Take 1 capsule (200 mg) by mouth 3 times daily as needed for cough 20 capsule 1     Cholecalciferol (VITAMIN D3) 250 MCG (07044 UT) TABS Take 1 tablet by mouth daily 09444/day       Cyanocobalamin (VITAMIN B-12) 5000 MCG SUBL Place 2-3 sprays under the tongue daily Unknown dose. 2 or 3 sprays/day       ethacrynic acid (EDECRIN) 25 MG tablet Take 1 tablet (25 mg) by mouth every other day 45 tablet 3     Fluticasone-Umeclidin-Vilanterol (TRELEGY ELLIPTA) 200-62.5-25 MCG/INH oral inhaler Inhale 1 puff into the lungs daily 4 each 3     HYDROcodone-acetaminophen (NORCO) 5-325 MG tablet Take 1 tablet by mouth every 6 hours as needed for breakthrough pain or moderate to severe pain May fill 1/30 for 1/31 112 tablet 0     hydrOXYzine (ATARAX) 25 MG tablet Take 1 tablet by mouth daily as needed       KLOR-CON 20 MEQ CR tablet Take 1 tablet (20 mEq total) by mouth 2 (two) times a day. 180 tablet 0     LITHIUM PO Take 25 mg by mouth  "daily OTC lithium oratate       magnesium 250 MG tablet Take 1 tablet by mouth 2 times daily       medical cannabis (Patient's own supply) See Admin Instructions (The purpose of this order is to document that the patient reports taking medical cannabis.  This is not a prescription, and is not used to certify that the patient has a qualifying medical condition.)  Flower       methocarbamol (ROBAXIN) 500 MG tablet Take 1 tablet (500 mg) by mouth 3 times daily 90 tablet 3     omeprazole (PRILOSEC) 20 MG DR capsule Take 1 capsule (20 mg) by mouth daily 90 capsule 3     OXcarbazepine (TRILEPTAL) 150 MG tablet Take one tablet daily at bedtime 30 tablet 3     rOPINIRole (REQUIP) 2 MG tablet One tab 3 pm, one bedtime, and one during night on waking 90 tablet 3     SYNTHROID 150 MCG tablet Take 1 tablet (150 mcg) by mouth daily 90 tablet 4     vitamin E 400 units TABS Take 800 Units by mouth daily          Allergies:  Allergies   Allergen Reactions     Serotonin Reuptake Inhibitors Anxiety, Difficulty breathing, Headache, Palpitations and Shortness Of Breath     Buspirone      The patient states she had serotonin syndrome     Cephalexin      Other reaction(s): unknown rxn.     Desvenlafaxine      Serotonin syndrome     Diclofenac Sodium [Diclofenac]      Serotonin syndrome and restless legs syndrome     Gabapentin      Drove on the wrong side of the highway     Levofloxacin      \"CAN'T REMEMBER\"     Penicillins      \"SORES IN MOUTH\"     Riluzole Difficulty breathing and Swelling     Sulfa Drugs      \"PT DOES NOT KNOW WHAT THE REACTION WAS\"       ROS:  A 10 point ROS is negative except as stated in the HPI    Social History:  Denies any tobacco use. Denies any illicit drug use.     Family History:  Reviewed- no interval updates    Video physical exam  General: Patient appears well in no acute distress.   Skin: No visualized rash or lesions on visualized skin  Eyes: EOMI, no erythema, sclera icterus or discharge noted  Resp: " Appears to be breathing comfortably without accessory muscle usage, speaking in full sentences, no cough  MSK: Appears to have normal range of motion based on visualized movements  Neurologic: No apparent tremors, facial movements symmetric  Psych: affect bright, alert and oriented      Labs: personally reviewed with relevant trends annotated below  Ferritin   Date Value Ref Range Status   01/18/2023 75 11 - 328 ng/mL Final   11/04/2022 85 11 - 328 ng/mL Final   07/01/2022 60 8 - 252 ng/mL Final   06/07/2022 125 10 - 130 ng/mL Final   02/23/2022 78 10 - 130 ng/mL Final   11/16/2021 101 8 - 252 ng/mL Final   02/05/2021 48 8 - 252 ng/mL Final   10/30/2020 20 8 - 252 ng/mL Final   02/12/2020 19 8 - 252 ng/mL Final   09/24/2019 33 8 - 252 ng/mL Final   09/19/2019 36 8 - 252 ng/mL Final   09/13/2019 42 8 - 252 ng/mL Final     Iron   Date Value Ref Range Status   01/18/2023 185 (H) 37 - 145 ug/dL Final   11/04/2022 201 (H) 37 - 145 ug/dL Final   07/01/2022 127 35 - 180 ug/dL Final   06/07/2022 216 (H) 35 - 180 ug/dL Final   02/23/2022 145 35 - 180 ug/dL Final   11/16/2021 270 (H) 35 - 180 ug/dL Final   10/01/2021 182 (H) 35 - 180 ug/dL Final   07/13/2021 165 35 - 180 ug/dL Final   02/05/2021 141 35 - 180 ug/dL Final   10/30/2020 52 35 - 180 ug/dL Final   09/24/2019 92 35 - 180 ug/dL Final   08/13/2019 237 (H) 35 - 180 ug/dL Final     WBC   Date Value Ref Range Status   02/05/2021 6.9 4.0 - 11.0 10e9/L Final     WBC Count   Date Value Ref Range Status   01/18/2023 6.4 4.0 - 11.0 10e3/uL Final     Hemoglobin   Date Value Ref Range Status   01/18/2023 17.2 (H) 11.7 - 15.7 g/dL Final   11/04/2022 17.3 (H) 11.7 - 15.7 g/dL Final   08/31/2022 16.7 (H) 11.7 - 15.7 g/dL Final   07/01/2022 15.8 (H) 11.7 - 15.7 g/dL Final   06/07/2022 16.4 (H) 11.7 - 15.7 g/dL Final   02/23/2022 15.4 11.7 - 15.7 g/dL Final   02/05/2021 17.7 (H) 11.7 - 15.7 g/dL Final   10/30/2020 15.4 11.7 - 15.7 g/dL Final   03/09/2020 14.3 11.7 - 15.7 g/dL Final    02/12/2020 14.0 11.7 - 15.7 g/dL Final   10/03/2019 14.6 11.7 - 15.7 g/dL Final   09/24/2019 15.7 11.7 - 15.7 g/dL Final       Imaging:  10/30/20  Average liver iron concentration is 0.7 mg/g dry tissue (normal = 0.17  - 1.8)  Average liver iron concentration is 12 mmol/kg dry tissue (normal = 3  - 33)      DION Burton CNP

## 2023-01-19 NOTE — TELEPHONE ENCOUNTER
Patient returned writer's call; scheduled to start in A-IOP on 1/30, will transfer to 55+ A-1 following orientation.     RAYNA Cuevas on 1/19/2023 at 9:05 AM

## 2023-01-20 ENCOUNTER — TRANSFERRED RECORDS (OUTPATIENT)
Dept: HEALTH INFORMATION MANAGEMENT | Facility: CLINIC | Age: 68
End: 2023-01-20

## 2023-01-24 ENCOUNTER — VIRTUAL VISIT (OUTPATIENT)
Dept: PSYCHOLOGY | Facility: CLINIC | Age: 68
End: 2023-01-24
Payer: MEDICARE

## 2023-01-24 DIAGNOSIS — F31.81 BIPOLAR 2 DISORDER (H): Primary | ICD-10-CM

## 2023-01-24 DIAGNOSIS — F43.9 TRAUMA AND STRESSOR-RELATED DISORDER: ICD-10-CM

## 2023-01-24 DIAGNOSIS — F41.1 GENERALIZED ANXIETY DISORDER: ICD-10-CM

## 2023-01-24 PROCEDURE — 90837 PSYTX W PT 60 MINUTES: CPT | Mod: 95 | Performed by: SOCIAL WORKER

## 2023-01-24 NOTE — PROGRESS NOTES
[facility]                                     Progress Note    Patient Name: Randi Cleary  Date: 1/24/23         Service Type: Individual     Session Start Time: 11:36 AM  Session End Time: 12:32 PM     Session Length: 56 minutes    Session #: 155    Attendees: Client attended alone    Service Modality:  Video Visit:      Provider verified identity through the following two step process.  Patient provided:  Patient is known previously to provider    Telemedicine Visit: The patient's condition can be safely assessed and treated via synchronous audio and visual telemedicine encounter.      Reason for Telemedicine Visit: Patient convenience (e.g. access to timely appointments / distance to available provider)    Originating Site (Patient Location): Patient's home    Distant Site (Provider Location): Provider Remote Setting- Home Office    Consent:  The patient/guardian has verbally consented to: the potential risks and benefits of telemedicine (video visit) versus in person care; bill my insurance or make self-payment for services provided; and responsibility for payment of non-covered services.     Patient would like the video invitation sent by:  My Chart    Mode of Communication:  Video Conference via [facility]    Distant Location (Provider):  Off-site    As the provider I attest to compliance with applicable laws and regulations related to telemedicine.    DATA  Extended Session (53+ minutes): PROLONGED SERVICE IN THE OUTPATIENT SETTING REQUIRING DIRECT (FACE-TO-FACE) PATIENT CONTACT BEYOND THE USUAL SERVICE:    - Patient's presenting concerns require more intensive intervention than could be completed within the usual service  Interactive Complexity: No  Crisis: No        Progress Since Last Session (Related to Symptoms / Goals / Homework):   Symptoms: Patient is continuing to have challenges with her mood    Homework: Achieved / completed to satisfaction  Continue managing your  inflammation -done     Episode of Care Goals: Satisfactory progress - ACTION (Actively working towards change); Intervened by reinforcing change plan / affirming steps taken     Current / Ongoing Stressors and Concerns:   Patient is currently socially isolated. She has a conflictual relationship with her .  She is getting minimal physical activity.  She had recent eye surgery and shoulder surgery.     Treatment Objective(s) Addressed in This Session:   Patient will increase frequency of engaging her in ADLs.  Patient will track and record at least 5 pleasant exchanges with . Patient will be able to identify at least 5 positive traits about her .  Patient will reduce level of depressive and anxious features as evidenced by reduction in score on her CHAVO-7 and PHQ-9 (scores of 15 and 16 at first measurment, respectively).     Intervention:   Solution Focused:   Processed patient's challenges with her wrists and this was impacting her mood. Discussed medical concerns. Processed challenges with peers lately. Talked about relationship challenges. Identified that she wants to not do day treatment in order to pursue other areas, including other medical concerns. Discussed returning to her primary care doctor for recommendations of where to go from here, and agreed to spend time creating a list of what to prioritize for this appointment. Talked about the importance of day treatment for her mental health.         The following assessments were completed by patient for this visit:  PHQ9: of note, patient reported she was in a hurry and didn't read all questions, she reports no suicidal ideation  PHQ-9 SCORE 12/21/2022 12/27/2022 12/30/2022 1/5/2023 1/10/2023 1/11/2023 1/11/2023   PHQ-9 Total Score [place] 15 (Moderately severe depression) 19 (Moderately severe depression) 18 (Moderately severe depression) 16 (Moderately severe depression) 16 (Moderately severe depression) - 15 (Moderately severe  depression)   PHQ-9 Total Score 15 19 18 16 16 15 15   Some encounter information is confidential and restricted. Go to Review Flowsheets activity to see all data.     GAD7:   CHAVO-7 SCORE 11/10/2022 12/15/2022 12/15/2022 12/15/2022 1/5/2023 1/5/2023 1/5/2023   Total Score 17 (severe anxiety) - - 19 (severe anxiety) - - 17 (severe anxiety)   Total Score 17 19 19 19 17 17 17   Some encounter information is confidential and restricted. Go to Review Flowsheets activity to see all data.     PROMIS 10-Global Health (only subscores and total score):   PROMIS-10 Scores Only 12/15/2022 12/27/2022 12/27/2022 12/27/2022 1/5/2023 1/5/2023 1/5/2023   Global Mental Health Score 6 6 6 6 5 5 5   Global Physical Health Score 7 8 8 8 8 8 8   PROMIS TOTAL - SUBSCORES 13 14 14 14 13 13 13   Some encounter information is confidential and restricted. Go to Review Flowsheets activity to see all data.        ASSESSMENT: Current Emotional / Mental Status (status of significant symptoms):   Risk status (Self / Other harm or suicidal ideation)   Patient denies current fears or concerns for personal safety.   Patient denies current or recent suicidal ideation or behaviors.   Patient denies current or recent homicidal ideation or behaviors.   Patient denies current or recent self injurious behavior or ideation.   Patient denies other safety concerns.   Patient reports there has been no change in risk factors since their last session.     Patient reports there has been no change in protective factors since their last session.     A safety and risk management plan has been developed including: Patient consented to co-developed safety plan on 11/24/2020.  Safety and risk management plan was reviewed.   Patient agreed to use safety plan should any safety concerns arise.  A copy was made available to the patient.     Appearance:   Appropriate    Eye Contact:   Good    Psychomotor Behavior: Normal    Attitude:   Cooperative     Orientation:   All   Speech    Rate / Production: Normal/ Responsive    Volume:  Normal    Mood:    Normal   Affect:    Appropriate    Thought Content:  Clear    Thought Form:  Coherent    Insight:    Good      Medication Review:   No changes to current psychiatric medication(s)     Medication Compliance:   Yes     Changes in Health Issues:   None reported     Chemical Use Review:   Substance Use: Chemical use reviewed, no active concerns identified Nothing used since 2021.     Tobacco Use: No current tobacco use.      Diagnosis:  1. Bipolar 2 disorder (H)    2. Generalized anxiety disorder    3. Trauma and stressor-related disorder      Collateral Reports Completed:   Not Applicable    PLAN: (Patient Tasks / Therapist Tasks / Other)  Next session start a list with primary of what to discuss.  Look to see if you can find a way to continue forward with day treatment        There has been demonstrated improvement in functioning while patient has been engaged in psychotherapy/psychological service- if withdrawn the patient would deteriorate and/or relapse.     MICAELA SLADE   2023                                                        ______________________________________________________________________    Individual Treatment Plan    Patient's Name: Randi Cleary  YOB: 1955    Date of Creation: 20  Date Treatment Plan Last Reviewed/Revised: 22    DSM5 Diagnoses: 296.89 Bipolar II Disorder Depressed, 300.02 (F41.1) Generalized Anxiety Disorder or Adjustment Disorders  309.89 (F43.8) Other Specified Trauma and Stressor Related Disorder  Psychosocial / Contextual Factors: Patient's entire family of origin has , she now has a sister-in-law and  as support.  Relationship with  is conflictual. She is recovering from surgeries  PROMIS (reviewed every 90 days): PROMIS-10 Scores  PROMIS 10-Global Health (only subscores and total score):   PROMIS-10  "Scores Only 12/14/2021 3/15/2022 3/15/2022 3/15/2022 3/24/2022 4/1/2022 6/9/2022   Global Mental Health Score 6 6 6 6 8 12 12   Global Physical Health Score 9 9 9 9 8 11 10   PROMIS TOTAL - SUBSCORES 15 15 15 15 16 23 22       Referral / Collaboration:  Referral to another professional/service is not indicated at this time..    Anticipated number of session for this episode of care: 50  Anticipation frequency of session: Biweekly  Anticipated Duration of each session: 53 or more minutes due to intensity of trauma symptoms  Treatment plan will be reviewed in 90 days or when goals have been changed.   There has been demonstrated improvement in functioning while patient has been engaged in psychotherapy/psychological service- if withdrawn the patient would deteriorate and/or relapse.       MeasurableTreatment Goal(s) related to diagnosis / functional impairment(s)  Goal 1: Patient will \"jumpstart, getting going with the things I need to be doing around the house as far as picking up, doing things, trying to do something every day.  Also to lessen the animosity between me and my .\"    I will know I've met my goal when my shoulders are fixed and I can see.      Objective #A (Patient Action)    Patient will increase frequency of engaging her in ADLs.  Status: Continued - Date(s): 12/10/21, 3/9/22, 6/9/22, 9/2/22, 12/1/22    Intervention(s)  Therapist will engage patient in CBT, specifically behavioral activation.    Objective #B  Patient will  track and record at least 5 pleasant exchanges with . Patient will be able to identify at least 5 positive traits about her  and how he relates to her.  Status: Continued - Date(s): 12/10/21, 3/9/22, 6/9/22, 9/2/22, 12/1/22    Intervention(s)  Therapist will teach assertiveness skills and assign homework related to relationship interactions.    Objective #C  Patient will reduce level of depressive and anxious features as evidenced by reduction in score on her " "CHAVO-7 and PHQ-9 (scores of 15 and 16 at first measurment, respectively).  Status: Continued - Date(s): 12/10/21, 3/9/22, 6/9/22, 9/2/22, 12/1/22    Intervention(s)  Therapist will engage patient in person-centered therapy and CBT.    Patient has reviewed and agreed to the above plan.      MICAELA SLADE  December 1, 2022                                                   Randi Cleary          SAFETY PLAN:  Step 1: Warning signs / cues (Thoughts, images, mood, situation, behavior) that a crisis may be developing:  ? Thoughts: \"I don't want to continue\" \"I am unwanted\"  ? Images: none  ? Thinking Processes: ruminating  ? Mood: anger  ? Behaviors: isolating/withdrawing , can't stop crying, not taking care of myself and not taking care of my responsibilities  ? Situations: small triggers, such as not being able to find something, or dropping something   Step 2: Coping strategies - Things I can do to take my mind off of my problems without contacting another person (relaxation technique, physical activity):  ? Distress Tolerance Strategies:  arts and crafts: drawing, play with my pet , listen to positive and upbeat music: any, change body temperature (ice pack/cold water)  and paced breathing/progressive muscle relaxation  ? Physical Activities: go for a walk, deep breathing and stretching   ? Focus on helpful thoughts:  \"You've been through this before, you can get through it again.\"  Step 3: People and social settings that provide distraction:                 Name: Carmen                            Name: Darien                           Name: Aleida       ? pool, shopping, Carmen's house, [employer]       Step 4: Remind myself of people and things that are important to me and worth living for:  Clifford Little Donna, post-COVID world, options of what could be in your future        Step 5: When I am in crisis, I can ask these people to help me use my safety plan:                 Name: Sidney  Step 6: Making the environment " safe:   ? go to sleep/daydream  Step 7: Professionals or agencies I can contact during a crisis:  ? [facility] Daytime Number: [phone]  ? Suicide Prevention Lifeline: 5-669-529-FSPH (8985)  ? Crisis Text Line Service (available 24 hours a day, 7 days a week): Text [place] to 841746    Local Crisis Services: [facility] Crisis: [phone]     Call 911 or go to my nearest emergency department.       I helped develop this safety plan and agree to use it when needed.  I have been given a copy of this plan.       Client signature _________________________________________________________________  Today s date:  11/24/2020  Adapted from Safety Plan Template 2008 Randi Poole and Robby Barba is reprinted with the express permission of the authors.  No portion of the Safety Plan Template may be reproduced without the express, written permission.  You can contact the authors at [email or web address] or [email or web address].

## 2023-01-25 ENCOUNTER — TELEPHONE (OUTPATIENT)
Dept: PALLIATIVE MEDICINE | Facility: OTHER | Age: 68
End: 2023-01-25
Payer: MEDICARE

## 2023-01-25 DIAGNOSIS — G95.20 CORD COMPRESSION (H): ICD-10-CM

## 2023-01-25 RX ORDER — HYDROCODONE BITARTRATE AND ACETAMINOPHEN 5; 325 MG/1; MG/1
1 TABLET ORAL EVERY 6 HOURS PRN
Qty: 112 TABLET | Refills: 0 | Status: CANCELLED | OUTPATIENT
Start: 2023-01-25

## 2023-01-25 NOTE — TELEPHONE ENCOUNTER
[facility]    Phone Message    May a detailed message be left on voicemail: yes     Reason for Call: Medication Refill Request    Has the patient contacted the pharmacy for the refill? Yes   Name of medication being requested: HYDROcodone-acetaminophen (NORCO) 5-325 MG tablet  Provider who prescribed the medication: Dusty Dubois MD  Pharmacy: [id number] [place] [address] [address]  Date medication is needed: 1/31/2023    Patient reminded of 7 day refill request policy.    Action Taken: Message routed to:  Other: [facility]    Travel Screening: Not Applicable

## 2023-01-25 NOTE — TELEPHONE ENCOUNTER
DR. Dubois Patient    Received call from patient requesting refill(s) HYDROcodone-acetaminophen (NORCO) 5-325 MG tablet    Last dispensed from pharmacy on 01/13/2022    Patient's last office/virtual visit by prescribing provider on 01/18/2023  Next office/virtual appointment scheduled for None    Last urine drug screen date 11/16/2022  Current opioid agreement on file (completed within the last year) Yes Date of opioid agreement: 11/16/2022    E-prescribe to      [id number] - [place], [place] - [address]   pharmacy    Will route to [facility] for review and preparation of prescription(s).     Reina Buchanan MA  [facility] [place]

## 2023-01-25 NOTE — TELEPHONE ENCOUNTER
RX already sent to the pharmacy:  HYDROcodone-acetaminophen (NORCO) 5-325 MG tablet 112 tablet 0 1/18/2023 2/15/2023 No   Sig - Route: Take 1 tablet by mouth every 6 hours as needed for breakthrough pain or moderate to severe pain May fill 1/30 for 1/31 - Oral   Sent to pharmacy as: HYDROcodone-Acetaminophen 5-325 MG Oral Tablet (NORCO)   Class: E-Prescribe   Earliest Fill Date: 1/18/2023   Order: [id number]   E-Prescribing Status: Receipt confirmed by pharmacy (1/18/2023 12:11 PM CST)

## 2023-01-26 ENCOUNTER — VIRTUAL VISIT (OUTPATIENT)
Dept: PSYCHOLOGY | Facility: CLINIC | Age: 68
End: 2023-01-26
Payer: MEDICARE

## 2023-01-26 DIAGNOSIS — F43.9 TRAUMA AND STRESSOR-RELATED DISORDER: ICD-10-CM

## 2023-01-26 DIAGNOSIS — F31.81 BIPOLAR 2 DISORDER (H): Primary | ICD-10-CM

## 2023-01-26 DIAGNOSIS — F41.1 GENERALIZED ANXIETY DISORDER: ICD-10-CM

## 2023-01-26 PROCEDURE — 90837 PSYTX W PT 60 MINUTES: CPT | Mod: 95 | Performed by: SOCIAL WORKER

## 2023-01-26 NOTE — PROGRESS NOTES
[facility]                                     Progress Note    Patient Name: Randi Cleary  Date: 1/26/23         Service Type: Individual     Session Start Time: 1:00 PM  Session End Time: 1:53 PM     Session Length: 53 minutes    Session #: 156    Attendees: Client attended alone    Service Modality:  Video Visit:      Provider verified identity through the following two step process.  Patient provided:  Patient is known previously to provider    Telemedicine Visit: The patient's condition can be safely assessed and treated via synchronous audio and visual telemedicine encounter.      Reason for Telemedicine Visit: Patient convenience (e.g. access to timely appointments / distance to available provider)    Originating Site (Patient Location): Patient's home    Distant Site (Provider Location): Provider Remote Setting- Home Office    Consent:  The patient/guardian has verbally consented to: the potential risks and benefits of telemedicine (video visit) versus in person care; bill my insurance or make self-payment for services provided; and responsibility for payment of non-covered services.     Patient would like the video invitation sent by:  My Chart    Mode of Communication:  [facility] Conference [facility]    Distant Location (Provider):  Off-site    As the provider I attest to compliance with applicable laws and regulations related to telemedicine.    DATA  Extended Session (53+ minutes): PROLONGED SERVICE IN THE OUTPATIENT SETTING REQUIRING DIRECT (FACE-TO-FACE) PATIENT CONTACT BEYOND THE USUAL SERVICE:    - Patient's presenting concerns require more intensive intervention than could be completed within the usual service  Interactive Complexity: No  Crisis: No        Progress Since Last Session (Related to Symptoms / Goals / Homework):   Symptoms: Patient is continuing to have challenges with her mood    Homework: Achieved / completed to satisfaction  Next session start a list with  primary of what to discuss. -done  Look to see if you can find a way to continue forward with day treatment -done     Episode of Care Goals: Satisfactory progress - ACTION (Actively working towards change); Intervened by reinforcing change plan / affirming steps taken     Current / Ongoing Stressors and Concerns:   Patient is currently socially isolated. She has a conflictual relationship with her .  She is getting minimal physical activity.  She had recent eye surgery and shoulder surgery.     Treatment Objective(s) Addressed in This Session:   Patient will increase frequency of engaging her in ADLs.  Patient will track and record at least 5 pleasant exchanges with . Patient will be able to identify at least 5 positive traits about her .  Patient will reduce level of depressive and anxious features as evidenced by reduction in score on her CHAVO-7 and PHQ-9 (scores of 15 and 16 at first measurment, respectively).     Intervention:   Solution Focused:   Set an agenda for today's meeting, and spent time focusing on how she was going to move forward and get help with her medical problems from her doctor and her mental health concerns from day treatment. Made some connections to childhood when she had to be around other's who had many medical needs that she was often left caring for. Agreed that as she's getting support in day treatment, she can return to IFS work to process these memories that impact her.      The following assessments were completed by patient for this visit:  PHQ9: of note, patient reported she was in a hurry and didn't read all questions, she reports no suicidal ideation  PHQ-9 SCORE 12/21/2022 12/27/2022 12/30/2022 1/5/2023 1/10/2023 1/11/2023 1/11/2023   PHQ-9 Total Score MyChart 15 (Moderately severe depression) 19 (Moderately severe depression) 18 (Moderately severe depression) 16 (Moderately severe depression) 16 (Moderately severe depression) - 15 (Moderately severe  depression)   PHQ-9 Total Score 15 19 18 16 16 15 15   Some encounter information is confidential and restricted. Go to Review Flowsheets activity to see all data.     GAD7:   CHAVO-7 SCORE 11/10/2022 12/15/2022 12/15/2022 12/15/2022 1/5/2023 1/5/2023 1/5/2023   Total Score 17 (severe anxiety) - - 19 (severe anxiety) - - 17 (severe anxiety)   Total Score 17 19 19 19 17 17 17   Some encounter information is confidential and restricted. Go to Review Flowsheets activity to see all data.     PROMIS 10-Global Health (only subscores and total score):   PROMIS-10 Scores Only 12/15/2022 12/27/2022 12/27/2022 12/27/2022 1/5/2023 1/5/2023 1/5/2023   Global Mental Health Score 6 6 6 6 5 5 5   Global Physical Health Score 7 8 8 8 8 8 8   PROMIS TOTAL - SUBSCORES 13 14 14 14 13 13 13   Some encounter information is confidential and restricted. Go to Review Flowsheets activity to see all data.        ASSESSMENT: Current Emotional / Mental Status (status of significant symptoms):   Risk status (Self / Other harm or suicidal ideation)   Patient denies current fears or concerns for personal safety.   Patient denies current or recent suicidal ideation or behaviors.   Patient denies current or recent homicidal ideation or behaviors.   Patient denies current or recent self injurious behavior or ideation.   Patient denies other safety concerns.   Patient reports there has been no change in risk factors since their last session.     Patient reports there has been no change in protective factors since their last session.     A safety and risk management plan has been developed including: Patient consented to co-developed safety plan on 11/24/2020.  Safety and risk management plan was reviewed.   Patient agreed to use safety plan should any safety concerns arise.  A copy was made available to the patient.     Appearance:   Appropriate    Eye Contact:   Good    Psychomotor Behavior: Normal    Attitude:   Cooperative     Orientation:   All   Speech    Rate / Production: Normal/ Responsive    Volume:  Normal    Mood:    Normal   Affect:    Appropriate    Thought Content:  Clear    Thought Form:  Coherent    Insight:    Good      Medication Review:   No changes to current psychiatric medication(s)     Medication Compliance:   Yes     Changes in Health Issues:   None reported     Chemical Use Review:   Substance Use: Chemical use reviewed, no active concerns identified Nothing used since 2021.     Tobacco Use: No current tobacco use.      Diagnosis:  1. Bipolar 2 disorder (H)    2. Generalized anxiety disorder    3. Trauma and stressor-related disorder      Collateral Reports Completed:   Not Applicable    PLAN: (Patient Tasks / Therapist Tasks / Other)  Talk to your primary care doctor about what's on your list (mood, neck, arthritis)  Next session continue IFS work with memories of the past and medical concerns of caring for those around you        There has been demonstrated improvement in functioning while patient has been engaged in psychotherapy/psychological service- if withdrawn the patient would deteriorate and/or relapse.     MICAELA SLADE   2023                                                        ______________________________________________________________________    Individual Treatment Plan    Patient's Name: Randi Cleary  YOB: 1955    Date of Creation: 20  Date Treatment Plan Last Reviewed/Revised: 22    DSM5 Diagnoses: 296.89 Bipolar II Disorder Depressed, 300.02 (F41.1) Generalized Anxiety Disorder or Adjustment Disorders  309.89 (F43.8) Other Specified Trauma and Stressor Related Disorder  Psychosocial / Contextual Factors: Patient's entire family of origin has , she now has a sister-in-law and  as support.  Relationship with  is conflictual. She is recovering from surgeries  PROMIS (reviewed every 90 days): PROMIS-10 Scores  PROMIS  "10-Global Health (only subscores and total score):   PROMIS-10 Scores Only 12/14/2021 3/15/2022 3/15/2022 3/15/2022 3/24/2022 4/1/2022 6/9/2022   Global Mental Health Score 6 6 6 6 8 12 12   Global Physical Health Score 9 9 9 9 8 11 10   PROMIS TOTAL - SUBSCORES 15 15 15 15 16 23 22       Referral / Collaboration:  Referral to another professional/service is not indicated at this time..    Anticipated number of session for this episode of care: 50  Anticipation frequency of session: Biweekly  Anticipated Duration of each session: 53 or more minutes due to intensity of trauma symptoms  Treatment plan will be reviewed in 90 days or when goals have been changed.   There has been demonstrated improvement in functioning while patient has been engaged in psychotherapy/psychological service- if withdrawn the patient would deteriorate and/or relapse.       MeasurableTreatment Goal(s) related to diagnosis / functional impairment(s)  Goal 1: Patient will \"jumpstart, getting going with the things I need to be doing around the house as far as picking up, doing things, trying to do something every day.  Also to lessen the animosity between me and my .\"    I will know I've met my goal when my shoulders are fixed and I can see.      Objective #A (Patient Action)    Patient will increase frequency of engaging her in ADLs.  Status: Continued - Date(s): 12/10/21, 3/9/22, 6/9/22, 9/2/22, 12/1/22    Intervention(s)  Therapist will engage patient in CBT, specifically behavioral activation.    Objective #B  Patient will  track and record at least 5 pleasant exchanges with . Patient will be able to identify at least 5 positive traits about her  and how he relates to her.  Status: Continued - Date(s): 12/10/21, 3/9/22, 6/9/22, 9/2/22, 12/1/22    Intervention(s)  Therapist will teach assertiveness skills and assign homework related to relationship interactions.    Objective #C  Patient will reduce level of depressive " "and anxious features as evidenced by reduction in score on her CHAVO-7 and PHQ-9 (scores of 15 and 16 at first measurment, respectively).  Status: Continued - Date(s): 12/10/21, 3/9/22, 6/9/22, 9/2/22, 12/1/22    Intervention(s)  Therapist will engage patient in person-centered therapy and CBT.    Patient has reviewed and agreed to the above plan.      MICAELA SLADE  December 1, 2022                                                   Randi Cleary          SAFETY PLAN:  Step 1: Warning signs / cues (Thoughts, images, mood, situation, behavior) that a crisis may be developing:  ? Thoughts: \"I don't want to continue\" \"I am unwanted\"  ? Images: none  ? Thinking Processes: ruminating  ? Mood: anger  ? Behaviors: isolating/withdrawing , can't stop crying, not taking care of myself and not taking care of my responsibilities  ? Situations: small triggers, such as not being able to find something, or dropping something   Step 2: Coping strategies - Things I can do to take my mind off of my problems without contacting another person (relaxation technique, physical activity):  ? Distress Tolerance Strategies:  arts and crafts: drawing, play with my pet , listen to positive and upbeat music: any, change body temperature (ice pack/cold water)  and paced breathing/progressive muscle relaxation  ? Physical Activities: go for a walk, deep breathing and stretching   ? Focus on helpful thoughts:  \"You've been through this before, you can get through it again.\"  Step 3: People and social settings that provide distraction:                 Name: Carmen                            Name: [place]                           Name: Aleida       ? pool, shopping, Carmen's house, [employer]       Step 4: Remind myself of people and things that are important to me and worth living for:  [place], Aleida Horton, post-COVID world, options of what could be in your future        Step 5: When I am in crisis, I can ask these people to help me use my safety " plan:                 Name: Sidney  Step 6: Making the environment safe:   ? go to sleep/daydream  Step 7: Professionals or agencies I can contact during a crisis:  ? [facility] Number: [phone]  ? Suicide Prevention Lifeline: 1-054-293-XPXL (5303)  ? Crisis Text Line Service (available 24 hours a day, 7 days a week): Text [place] to 616082    Local Crisis Services: [facility] Crisis: [phone]     Call 911 or go to my nearest emergency department.       I helped develop this safety plan and agree to use it when needed.  I have been given a copy of this plan.       Client signature _________________________________________________________________  Today s date:  11/24/2020  Adapted from Safety Plan Template 2008 Randi Poole and Robby Barba is reprinted with the express permission of the authors.  No portion of the Safety Plan Template may be reproduced without the express, written permission.  You can contact the authors at [email or web address] or [email or web address].

## 2023-01-30 ENCOUNTER — VIRTUAL VISIT (OUTPATIENT)
Dept: PSYCHOLOGY | Facility: CLINIC | Age: 68
End: 2023-01-30
Payer: MEDICARE

## 2023-01-30 DIAGNOSIS — F43.9 TRAUMA AND STRESSOR-RELATED DISORDER: ICD-10-CM

## 2023-01-30 DIAGNOSIS — F31.81 BIPOLAR 2 DISORDER (H): Primary | ICD-10-CM

## 2023-01-30 DIAGNOSIS — F41.1 GENERALIZED ANXIETY DISORDER: ICD-10-CM

## 2023-01-30 PROCEDURE — 90837 PSYTX W PT 60 MINUTES: CPT | Mod: 95 | Performed by: SOCIAL WORKER

## 2023-01-30 ASSESSMENT — ANXIETY QUESTIONNAIRES
GAD7 TOTAL SCORE: 17
7. FEELING AFRAID AS IF SOMETHING AWFUL MIGHT HAPPEN: MORE THAN HALF THE DAYS
GAD7 TOTAL SCORE: 17
1. FEELING NERVOUS, ANXIOUS, OR ON EDGE: NEARLY EVERY DAY
GAD7 TOTAL SCORE: 17
4. TROUBLE RELAXING: NEARLY EVERY DAY
8. IF YOU CHECKED OFF ANY PROBLEMS, HOW DIFFICULT HAVE THESE MADE IT FOR YOU TO DO YOUR WORK, TAKE CARE OF THINGS AT HOME, OR GET ALONG WITH OTHER PEOPLE?: VERY DIFFICULT
7. FEELING AFRAID AS IF SOMETHING AWFUL MIGHT HAPPEN: MORE THAN HALF THE DAYS
6. BECOMING EASILY ANNOYED OR IRRITABLE: NEARLY EVERY DAY
IF YOU CHECKED OFF ANY PROBLEMS ON THIS QUESTIONNAIRE, HOW DIFFICULT HAVE THESE PROBLEMS MADE IT FOR YOU TO DO YOUR WORK, TAKE CARE OF THINGS AT HOME, OR GET ALONG WITH OTHER PEOPLE: VERY DIFFICULT
2. NOT BEING ABLE TO STOP OR CONTROL WORRYING: MORE THAN HALF THE DAYS
5. BEING SO RESTLESS THAT IT IS HARD TO SIT STILL: SEVERAL DAYS
3. WORRYING TOO MUCH ABOUT DIFFERENT THINGS: NEARLY EVERY DAY

## 2023-01-30 NOTE — PROGRESS NOTES
[facility]                                     Progress Note    Patient Name: Randi Cleary  Date: 1/30/23         Service Type: Individual     Session Start Time: 2:03 PM  Session End Time: 2:56 PM     Session Length: 53 minutes    Session #: 157    Attendees: Client attended alone    Service Modality:  Video Visit:      Provider verified identity through the following two step process.  Patient provided:  Patient is known previously to provider    Telemedicine Visit: The patient's condition can be safely assessed and treated via synchronous audio and visual telemedicine encounter.      Reason for Telemedicine Visit: Patient convenience (e.g. access to timely appointments / distance to available provider)    Originating Site (Patient Location): Patient's home    Distant Site (Provider Location): Provider Remote Setting- Home Office    Consent:  The patient/guardian has verbally consented to: the potential risks and benefits of telemedicine (video visit) versus in person care; bill my insurance or make self-payment for services provided; and responsibility for payment of non-covered services.     Patient would like the video invitation sent by:  My Chart    Mode of Communication:  [facility]    Distant Location (Provider):  Off-site    As the provider I attest to compliance with applicable laws and regulations related to telemedicine.    DATA  Extended Session (53+ minutes): PROLONGED SERVICE IN THE OUTPATIENT SETTING REQUIRING DIRECT (FACE-TO-FACE) PATIENT CONTACT BEYOND THE USUAL SERVICE:    - Patient's presenting concerns require more intensive intervention than could be completed within the usual service  Interactive Complexity: No  Crisis: No        Progress Since Last Session (Related to Symptoms / Goals / Homework):   Symptoms: Patient is continuing to have challenges with her mood    Homework: Achieved / completed to satisfaction  Talk to your primary care doctor  about what's on your list (mood, neck, arthritis)  Next session continue IFS work with memories of the past and medical concerns of caring for those around you     Episode of Care Goals: Satisfactory progress - ACTION (Actively working towards change); Intervened by reinforcing change plan / affirming steps taken     Current / Ongoing Stressors and Concerns:   Patient is currently socially isolated. She has a conflictual relationship with her .  She is getting minimal physical activity.  She had recent eye surgery and shoulder surgery.     Treatment Objective(s) Addressed in This Session:   Patient will increase frequency of engaging her in ADLs.  Patient will track and record at least 5 pleasant exchanges with . Patient will be able to identify at least 5 positive traits about her .  Patient will reduce level of depressive and anxious features as evidenced by reduction in score on her CHAVO-7 and PHQ-9 (scores of 15 and 16 at first measurment, respectively).     Intervention:   Solution Focused:   Processed medical challenges. Talked about the feeling of overwhelm and how it is showing up for her lately. Identified how to slow down and take care of what is important. Set new goals related to this. Talked about prioritizing her mental health and attending day treatment, as well as processing concerns related to this.      The following assessments were completed by patient for this visit:  PHQ9: of note, patient reported she was in a hurry and didn't read all questions, she reports no suicidal ideation  PHQ-9 SCORE 12/21/2022 12/27/2022 12/30/2022 1/5/2023 1/10/2023 1/11/2023 1/11/2023   PHQ-9 Total Score MyChart 15 (Moderately severe depression) 19 (Moderately severe depression) 18 (Moderately severe depression) 16 (Moderately severe depression) 16 (Moderately severe depression) - 15 (Moderately severe depression)   PHQ-9 Total Score 15 19 18 16 16 15 15   Some encounter information is  confidential and restricted. Go to Review Flowsheets activity to see all data.     GAD7:   CHAVO-7 SCORE 11/10/2022 12/15/2022 12/15/2022 12/15/2022 1/5/2023 1/5/2023 1/5/2023   Total Score 17 (severe anxiety) - - 19 (severe anxiety) - - 17 (severe anxiety)   Total Score 17 19 19 19 17 17 17   Some encounter information is confidential and restricted. Go to Review Flowsheets activity to see all data.     PROMIS 10-Global Health (only subscores and total score):   PROMIS-10 Scores Only 12/15/2022 12/27/2022 12/27/2022 12/27/2022 1/5/2023 1/5/2023 1/5/2023   Global Mental Health Score 6 6 6 6 5 5 5   Global Physical Health Score 7 8 8 8 8 8 8   PROMIS TOTAL - SUBSCORES 13 14 14 14 13 13 13   Some encounter information is confidential and restricted. Go to Review Flowsheets activity to see all data.        ASSESSMENT: Current Emotional / Mental Status (status of significant symptoms):   Risk status (Self / Other harm or suicidal ideation)   Patient denies current fears or concerns for personal safety.   Patient denies current or recent suicidal ideation or behaviors.   Patient denies current or recent homicidal ideation or behaviors.   Patient denies current or recent self injurious behavior or ideation.   Patient denies other safety concerns.   Patient reports there has been no change in risk factors since their last session.     Patient reports there has been no change in protective factors since their last session.     A safety and risk management plan has been developed including: Patient consented to co-developed safety plan on 11/24/2020.  Safety and risk management plan was reviewed.   Patient agreed to use safety plan should any safety concerns arise.  A copy was made available to the patient.     Appearance:   Appropriate    Eye Contact:   Good    Psychomotor Behavior: Normal    Attitude:   Cooperative    Orientation:   All   Speech    Rate / Production: Normal/ Responsive    Volume:  Normal     Mood:    Normal   Affect:    Appropriate    Thought Content:  Clear    Thought Form:  Coherent    Insight:    Good      Medication Review:   No changes to current psychiatric medication(s)     Medication Compliance:   Yes     Changes in Health Issues:   None reported     Chemical Use Review:   Substance Use: Chemical use reviewed, no active concerns identified Nothing used since 2021.     Tobacco Use: No current tobacco use.      Diagnosis:  1. Bipolar 2 disorder (H)    2. Generalized anxiety disorder    3. Trauma and stressor-related disorder      Collateral Reports Completed:   Not Applicable    PLAN: (Patient Tasks / Therapist Tasks / Other)  Call about couples counseling  Talk to your primary care doctor about what's on your list (mood, neck, arthritis)        There has been demonstrated improvement in functioning while patient has been engaged in psychotherapy/psychological service- if withdrawn the patient would deteriorate and/or relapse.     MICAELA SLADE   2023                                                        ______________________________________________________________________    Individual Treatment Plan    Patient's Name: Randi Cleary  YOB: 1955    Date of Creation: 20  Date Treatment Plan Last Reviewed/Revised: 22    DSM5 Diagnoses: 296.89 Bipolar II Disorder Depressed, 300.02 (F41.1) Generalized Anxiety Disorder or Adjustment Disorders  309.89 (F43.8) Other Specified Trauma and Stressor Related Disorder  Psychosocial / Contextual Factors: Patient's entire family of origin has , she now has a sister-in-law and  as support.  Relationship with  is conflictual. She is recovering from surgeries  PROMIS (reviewed every 90 days): PROMIS-10 Scores  PROMIS 10-Global Health (only subscores and total score):   PROMIS-10 Scores Only 2021 3/15/2022 3/15/2022 3/15/2022 3/24/2022 2022 2022   Global Mental Health Score 6 6  "6 6 8 12 12   Global Physical Health Score 9 9 9 9 8 11 10   PROMIS TOTAL - SUBSCORES 15 15 15 15 16 23 22       Referral / Collaboration:  Referral to another professional/service is not indicated at this time..    Anticipated number of session for this episode of care: 50  Anticipation frequency of session: Biweekly  Anticipated Duration of each session: 53 or more minutes due to intensity of trauma symptoms  Treatment plan will be reviewed in 90 days or when goals have been changed.   There has been demonstrated improvement in functioning while patient has been engaged in psychotherapy/psychological service- if withdrawn the patient would deteriorate and/or relapse.       MeasurableTreatment Goal(s) related to diagnosis / functional impairment(s)  Goal 1: Patient will \"jumpstart, getting going with the things I need to be doing around the house as far as picking up, doing things, trying to do something every day.  Also to lessen the animosity between me and my .\"    I will know I've met my goal when my shoulders are fixed and I can see.      Objective #A (Patient Action)    Patient will increase frequency of engaging her in ADLs.  Status: Continued - Date(s): 12/10/21, 3/9/22, 6/9/22, 9/2/22, 12/1/22    Intervention(s)  Therapist will engage patient in CBT, specifically behavioral activation.    Objective #B  Patient will  track and record at least 5 pleasant exchanges with . Patient will be able to identify at least 5 positive traits about her  and how he relates to her.  Status: Continued - Date(s): 12/10/21, 3/9/22, 6/9/22, 9/2/22, 12/1/22    Intervention(s)  Therapist will teach assertiveness skills and assign homework related to relationship interactions.    Objective #C  Patient will reduce level of depressive and anxious features as evidenced by reduction in score on her CHAVO-7 and PHQ-9 (scores of 15 and 16 at first measurment, respectively).  Status: Continued - Date(s): 12/10/21, " "3/9/22, 6/9/22, 9/2/22, 12/1/22    Intervention(s)  Therapist will engage patient in person-centered therapy and CBT.    Patient has reviewed and agreed to the above plan.      MICAELA SLADE  December 1, 2022                                                   Randi Cleary          SAFETY PLAN:  Step 1: Warning signs / cues (Thoughts, images, mood, situation, behavior) that a crisis may be developing:  ? Thoughts: \"I don't want to continue\" \"I am unwanted\"  ? Images: none  ? Thinking Processes: ruminating  ? Mood: anger  ? Behaviors: isolating/withdrawing , can't stop crying, not taking care of myself and not taking care of my responsibilities  ? Situations: small triggers, such as not being able to find something, or dropping something   Step 2: Coping strategies - Things I can do to take my mind off of my problems without contacting another person (relaxation technique, physical activity):  ? Distress Tolerance Strategies:  arts and crafts: drawing, play with my pet , listen to positive and upbeat music: any, change body temperature (ice pack/cold water)  and paced breathing/progressive muscle relaxation  ? Physical Activities: go for a walk, deep breathing and stretching   ? Focus on helpful thoughts:  \"You've been through this before, you can get through it again.\"  Step 3: People and social settings that provide distraction:                 Name: Carmen                            Name: Darien                           Name: Aleida       ? pool, shopping, Carmen's house, [employer]       Step 4: Remind myself of people and things that are important to me and worth living for:  Clifford Little Donna, post-COVID world, options of what could be in your future        Step 5: When I am in crisis, I can ask these people to help me use my safety plan:                 Name: Sidney  Step 6: Making the environment safe:   ? go to sleep/daydream  Step 7: Professionals or agencies I can contact during a crisis:  ? [place] " [facility] Number: [phone]  ? Suicide Prevention Lifeline: 5-345-355-TALK (1767)  ? Crisis Text Line Service (available 24 hours a day, 7 days a week): Text [place] to 057581    Local Crisis Services: [facility] Crisis: [phone]     Call 911 or go to my nearest emergency department.       I helped develop this safety plan and agree to use it when needed.  I have been given a copy of this plan.       Client signature _________________________________________________________________  Today s date:  11/24/2020  Adapted from Safety Plan Template 2008 Randi Poole and Robby Barba is reprinted with the express permission of the authors.  No portion of the Safety Plan Template may be reproduced without the express, written permission.  You can contact the authors at [email or web address] or [email or web address].

## 2023-02-01 ENCOUNTER — VIRTUAL VISIT (OUTPATIENT)
Dept: PSYCHOLOGY | Facility: CLINIC | Age: 68
End: 2023-02-01
Payer: MEDICARE

## 2023-02-01 ENCOUNTER — TRANSFERRED RECORDS (OUTPATIENT)
Dept: HEALTH INFORMATION MANAGEMENT | Facility: CLINIC | Age: 68
End: 2023-02-01

## 2023-02-01 DIAGNOSIS — F41.1 GENERALIZED ANXIETY DISORDER: ICD-10-CM

## 2023-02-01 DIAGNOSIS — F31.81 BIPOLAR 2 DISORDER (H): Primary | ICD-10-CM

## 2023-02-01 DIAGNOSIS — F43.9 TRAUMA AND STRESSOR-RELATED DISORDER: ICD-10-CM

## 2023-02-01 PROCEDURE — 90834 PSYTX W PT 45 MINUTES: CPT | Mod: 95 | Performed by: SOCIAL WORKER

## 2023-02-01 NOTE — PROGRESS NOTES
[facility]                                     Progress Note    Patient Name: Randi Cleary  Date: 2/1/23         Service Type: Individual     Session Start Time: 2:03 PM  Session End Time: 2:51 PM     Session Length: 48 minutes    Session #: 158    Attendees: Client attended alone    Service Modality:  Video Visit:      Provider verified identity through the following two step process.  Patient provided:  Patient is known previously to provider    Telemedicine Visit: The patient's condition can be safely assessed and treated via synchronous audio and visual telemedicine encounter.      Reason for Telemedicine Visit: Patient convenience (e.g. access to timely appointments / distance to available provider)    Originating Site (Patient Location): Patient's home    Distant Site (Provider Location): Provider Remote Setting- Home Office    Consent:  The patient/guardian has verbally consented to: the potential risks and benefits of telemedicine (video visit) versus in person care; bill my insurance or make self-payment for services provided; and responsibility for payment of non-covered services.     Patient would like the video invitation sent by:  My Chart    Mode of Communication:  [facility] Conference [facility]    Distant Location (Provider):  Off-site    As the provider I attest to compliance with applicable laws and regulations related to telemedicine.    DATA  Extended Session (53+ minutes): No  Interactive Complexity: No  Crisis: No        Progress Since Last Session (Related to Symptoms / Goals / Homework):   Symptoms: Patient is continuing to have challenges with her mood    Homework: Progress made  Call about couples counseling -not yet done  Talk to your primary care doctor about what's on your list (mood, neck, arthritis) -not yet done, waiting on hand appointment     Episode of Care Goals: Satisfactory progress - ACTION (Actively working towards change); Intervened by reinforcing  change plan / affirming steps taken     Current / Ongoing Stressors and Concerns:   Patient is currently socially isolated. She has a conflictual relationship with her .  She is getting minimal physical activity.  She had recent eye surgery and shoulder surgery.     Treatment Objective(s) Addressed in This Session:   Patient will increase frequency of engaging her in ADLs.  Patient will track and record at least 5 pleasant exchanges with . Patient will be able to identify at least 5 positive traits about her .  Patient will reduce level of depressive and anxious features as evidenced by reduction in score on her CHAVO-7 and PHQ-9 (scores of 15 and 16 at first measurment, respectively).     Intervention:   Solution Focused:   Processed medical events recently in patient's life. Also processed financial concerns. Talked about relationships and process emotions around these. Discussed upcoming day treatment program.      The following assessments were completed by patient for this visit:  PHQ9: of note, patient reported she was in a hurry and didn't read all questions, she reports no suicidal ideation  PHQ-9 SCORE 12/27/2022 12/30/2022 1/5/2023 1/10/2023 1/11/2023 1/11/2023 1/30/2023   PHQ-9 Total Score MyChart 19 (Moderately severe depression) 18 (Moderately severe depression) 16 (Moderately severe depression) 16 (Moderately severe depression) - 15 (Moderately severe depression) 13 (Moderate depression)   PHQ-9 Total Score 19 18 16 16 15 15 13   Some encounter information is confidential and restricted. Go to Review knowNormal activity to see all data.     GAD7:   CHAVO-7 SCORE 12/15/2022 12/15/2022 12/15/2022 1/5/2023 1/5/2023 1/5/2023 1/30/2023   Total Score - - 19 (severe anxiety) - - 17 (severe anxiety) 17 (severe anxiety)   Total Score 19 19 19 17 17 17 17   Some encounter information is confidential and restricted. Go to Review knowNormal activity to see all data.     PROMIS 10-Global Health  (only subscores and total score):   PROMIS-10 Scores Only 12/15/2022 12/27/2022 12/27/2022 12/27/2022 1/5/2023 1/5/2023 1/5/2023   Global Mental Health Score 6 6 6 6 5 5 5   Global Physical Health Score 7 8 8 8 8 8 8   PROMIS TOTAL - SUBSCORES 13 14 14 14 13 13 13   Some encounter information is confidential and restricted. Go to Review Flowsheets activity to see all data.        ASSESSMENT: Current Emotional / Mental Status (status of significant symptoms):   Risk status (Self / Other harm or suicidal ideation)   Patient denies current fears or concerns for personal safety.   Patient denies current or recent suicidal ideation or behaviors.   Patient denies current or recent homicidal ideation or behaviors.   Patient denies current or recent self injurious behavior or ideation.   Patient denies other safety concerns.   Patient reports there has been no change in risk factors since their last session.     Patient reports there has been no change in protective factors since their last session.     A safety and risk management plan has been developed including: Patient consented to co-developed safety plan on 11/24/2020.  Safety and risk management plan was reviewed.   Patient agreed to use safety plan should any safety concerns arise.  A copy was made available to the patient.     Appearance:   Appropriate    Eye Contact:   Good    Psychomotor Behavior: Normal    Attitude:   Cooperative    Orientation:   All   Speech    Rate / Production: Normal/ Responsive    Volume:  Normal    Mood:    Normal   Affect:    Appropriate    Thought Content:  Clear    Thought Form:  Coherent    Insight:    Good      Medication Review:   No changes to current psychiatric medication(s)     Medication Compliance:   Yes     Changes in Health Issues:   None reported     Chemical Use Review:   Substance Use: Chemical use reviewed, no active concerns identified Nothing used since August 2021.     Tobacco Use: No current tobacco use.       Diagnosis:  1. Bipolar 2 disorder (H)    2. Generalized anxiety disorder    3. Trauma and stressor-related disorder      Collateral Reports Completed:   Not Applicable    PLAN: (Patient Tasks / Therapist Tasks / Other)  Call about couples counseling  Talk to your primary care doctor about what's on your list (mood, neck, arthritis)        There has been demonstrated improvement in functioning while patient has been engaged in psychotherapy/psychological service- if withdrawn the patient would deteriorate and/or relapse.     MICAELA SLADE   2023                                                        ______________________________________________________________________    Individual Treatment Plan    Patient's Name: Randi Cleary  YOB: 1955    Date of Creation: 20  Date Treatment Plan Last Reviewed/Revised: 22    DSM5 Diagnoses: 296.89 Bipolar II Disorder Depressed, 300.02 (F41.1) Generalized Anxiety Disorder or Adjustment Disorders  309.89 (F43.8) Other Specified Trauma and Stressor Related Disorder  Psychosocial / Contextual Factors: Patient's entire family of origin has , she now has a sister-in-law and  as support.  Relationship with  is conflictual. She is recovering from surgeries  PROMIS (reviewed every 90 days): PROMIS-10 Scores  PROMIS 10-Global Health (only subscores and total score):   PROMIS-10 Scores Only 2021 3/15/2022 3/15/2022 3/15/2022 3/24/2022 2022 2022   Global Mental Health Score 6 6 6 6 8 12 12   Global Physical Health Score 9 9 9 9 8 11 10   PROMIS TOTAL - SUBSCORES 15 15 15 15 16 23 22       Referral / Collaboration:  Referral to another professional/service is not indicated at this time..    Anticipated number of session for this episode of care: 50  Anticipation frequency of session: Biweekly  Anticipated Duration of each session: 53 or more minutes due to intensity of trauma symptoms  Treatment plan will be  "reviewed in 90 days or when goals have been changed.   There has been demonstrated improvement in functioning while patient has been engaged in psychotherapy/psychological service- if withdrawn the patient would deteriorate and/or relapse.       MeasurableTreatment Goal(s) related to diagnosis / functional impairment(s)  Goal 1: Patient will \"jumpstart, getting going with the things I need to be doing around the house as far as picking up, doing things, trying to do something every day.  Also to lessen the animosity between me and my .\"    I will know I've met my goal when my shoulders are fixed and I can see.      Objective #A (Patient Action)    Patient will increase frequency of engaging her in ADLs.  Status: Continued - Date(s): 12/10/21, 3/9/22, 6/9/22, 9/2/22, 12/1/22    Intervention(s)  Therapist will engage patient in CBT, specifically behavioral activation.    Objective #B  Patient will  track and record at least 5 pleasant exchanges with . Patient will be able to identify at least 5 positive traits about her  and how he relates to her.  Status: Continued - Date(s): 12/10/21, 3/9/22, 6/9/22, 9/2/22, 12/1/22    Intervention(s)  Therapist will teach assertiveness skills and assign homework related to relationship interactions.    Objective #C  Patient will reduce level of depressive and anxious features as evidenced by reduction in score on her CHAVO-7 and PHQ-9 (scores of 15 and 16 at first measurment, respectively).  Status: Continued - Date(s): 12/10/21, 3/9/22, 6/9/22, 9/2/22, 12/1/22    Intervention(s)  Therapist will engage patient in person-centered therapy and CBT.    Patient has reviewed and agreed to the above plan.      MICAELA SLADE  December 1, 2022                                                   Randi Cleary          SAFETY PLAN:  Step 1: Warning signs / cues (Thoughts, images, mood, situation, behavior) that a crisis may be developing:  ? Thoughts: \"I don't want " "to continue\" \"I am unwanted\"  ? Images: none  ? Thinking Processes: ruminating  ? Mood: anger  ? Behaviors: isolating/withdrawing , can't stop crying, not taking care of myself and not taking care of my responsibilities  ? Situations: small triggers, such as not being able to find something, or dropping something   Step 2: Coping strategies - Things I can do to take my mind off of my problems without contacting another person (relaxation technique, physical activity):  ? Distress Tolerance Strategies:  arts and crafts: drawing, play with my pet , listen to positive and upbeat music: any, change body temperature (ice pack/cold water)  and paced breathing/progressive muscle relaxation  ? Physical Activities: go for a walk, deep breathing and stretching   ? Focus on helpful thoughts:  \"You've been through this before, you can get through it again.\"  Step 3: People and social settings that provide distraction:                 Name: Carmen                            Name: Darien                           Name: Aleida       ? pool, shopping, Carmen's house, [employer]       Step 4: Remind myself of people and things that are important to me and worth living for:  Clifford Little Donna, post-COVID world, options of what could be in your future        Step 5: When I am in crisis, I can ask these people to help me use my safety plan:                 Name: Sidney  Step 6: Making the environment safe:   ? go to sleep/daydream  Step 7: Professionals or agencies I can contact during a crisis:  ? [facility] Daytime Number: 080-378-0001  ? Suicide Prevention Lifeline: 2-874-120-NRLC (0612)  ? Crisis Text Line Service (available 24 hours a day, 7 days a week): Text [place] to 165281    Local Crisis Services: [facility] Crisis: [phone]     Call 911 or go to my nearest emergency department.       I helped develop this safety plan and agree to use it when needed.  I have been given a copy of this plan.       Client " signature _________________________________________________________________  Today s date:  11/24/2020  Adapted from Safety Plan Template 2008 Randi Poole and Robby Barba is reprinted with the express permission of the authors.  No portion of the Safety Plan Template may be reproduced without the express, written permission.  You can contact the authors at [email or web address] or [email or web address].

## 2023-02-03 ENCOUNTER — TELEPHONE (OUTPATIENT)
Dept: BEHAVIORAL HEALTH | Facility: CLINIC | Age: 68
End: 2023-02-03
Payer: MEDICARE
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
## 2023-02-03 NOTE — PROGRESS NOTES
"Outpatient Mental Health Services - Adult     MY COPING PLAN FOR SAFETY     Name: Randi Cleary  YOB: 1955  Date: 1/09/23  My primary care provider: new [place] doctor  My prescriber: Dr. Dubois  Other care team support:  [facility] therapist Mary Kay Weir My Triggers:  Pain, relationship tension with , health.       Additional People, Places, and Things that I can access for support: sister in law Vania Shin, therapist MaryK ay Gomez.              What is important to me and makes life worth living: Her  Sidney and 3 good girlfriends she has known for years.  And \"My Clifford,\" her cat.          GREEN     Good Control  1. I feel good  2. No suicidal thoughts   3. Can work, sleep and play        Action Steps  1. Self-care: balanced meals, exercising, sleep practices, etc.  2. Take your medications as prescribed.  3. Continue meetings with therapist and prescriber.  4.  Do the healthy things that I enjoy.             YELLOW  Getting Worse  I have ANY of these:  1. I do not feel good  2. Difficulty Concentrating  3. Sleep is changing  4. Increase/Change in my thoughts to hurt self and/or others, but I can still manage and not act on it.   5. Not taking care of self.             Action Steps (in addition to the above):  1. Inform your therapist and psychiatric prescriber/PCP.  2. Keep taking your medications as prescribed.    3. Turn to people you can ask for help.  4. Use internal coping strategies -see below.  5. Create safe environment: lock and limit medications and notify friends/family of increase in symptoms             RED  Get Help  If I have ANY of these:  1. Current and uncontrollable thoughts and/or behaviors to hurt self and/or others.  Actions to manage my safety  1. Contact your emergency person:  Sidney Cleary [phone]  2. Call my crisis team- [facility] [phone]  3. Or Call 911 or go to the emergency room right away         My Internal Coping Strategies " include the following:  She uses music to help herself feel better. Other times, she just tries to work through it with her mind. She will start reading, try to think of other people, or inspirations.      Safety Concerns  How To Identify Situations That Make Your Mental Health Worse:  Triggers are things that make your mental health worse.  Look at the list below to help you find your triggers and what you can do about them.      1. Identify Early Warning Signs:     Sometimes symptoms return, even when people do their best to stay well. Symptoms can develop over a short period of time with little or no warning, but most of the time they emerge gradually over several weeks.  Early warning signs are changes that people experience when a relapse is starting. Some early warning signs are common and others are not as common.   Common Early Warning Signs:    Feeling tense or nervous, Eating less or eating more, Trouble sleeping -either too much or too little sleep, Feeling depressed or low, Feeling irritable, Feeling like not being around other people, Trouble concentrating, Urges to harm self and Urges to use drugs or alcohol                 2. Identify action steps to take when warning signs are noticed:     Taking Action- It is important to take action if you are experiencing early warning signs of a relapse.  The faster you act, the more likely it is that you can avoid a full relapse.  It is helpful to identify several specific ways to cope with symptoms.       The following is my list of symptoms and coping strategies that I can use when they are present:     Symptom Coping Strategies   Anxiety -Talk with someone in your support system and let him or her know how you are feeling.  -Use relaxation techniques such as deep breathing or imagery.  -Use positive affirmations to counteract negative self-talk such as  I am learning to let go of worry.    Depression - Schedule your day; include activities you have to do and  activities you enjoy doing.  - Get some exercise - walk, run, bike, or swim.  - Give yourself credit for even the smallest things you get done.   Sleep Difficulties    - Go to sleep at the same time every day.  - Do something relaxing before bed, such as drinking herbal tea or listening to music.  - Avoid having discussions about upsetting topics before going to bed.   Concentration Difficulties - Minimize distractions so there is only one thing for you to focus on at a time.    - Ask the person you are having a conversation with to slow down or repeat things you are unsure of.

## 2023-02-03 NOTE — TELEPHONE ENCOUNTER
Writer called to conduct Admission. Left VM requesting call back. Will reattempt.  Sent lo Ambrose RN on 2/3/2023 at 12:21 PM

## 2023-02-03 NOTE — PROGRESS NOTES
"    Admission SBAR NOTE  Adult  Outpatient Programs          SITUATION:     Admission Date: 2023    Provider verified identity through the following two step process.  Patient provided: verbal spelling of full first and last name and Patient     Patient name:  Randi Cleary  Preferred name: Winnie She/Her/Hers/Herself 67 year old  Diagnosis/-es (copy from [facility], including ICD-10):   296.33 (F33.2) Major Depressive Disorder, Recurrent Episode, Severe With anxious distress  4. Other Diagnoses that is relevant to services:   300.02 (F41.1) Generalized Anxiety Disorder    5. Provisional Diagnosis:  296.89 Bipolar II Disorder vs. 314.01 (F90.2) Attention-Deficit/Hyperactivity Disorder   Combined presentation       Assigned Program/Track: AI[facility]    Reviewed patient's schedule and informed them of any variation due to holidays. yes    Does the patient have any planned absences and/or barriers to admission/treatment? yes - possibly ; some mental/energy impact  NOTE: impact of transportation, technology, childcare, work, or housing concerns.    Insurance: Payor: MEDICARE / Plan: MEDICARE / Product Type: Medicare /  Changes/Concerns: no    Does patient need an appointment with the program provider? yes 2023    NOTE: If yes, please confirm/schedule provider visit.      BACKGROUND:     Patient's stated goal/reason for treatment (copy from [facility]; confirm with patient): \"\"Coping and living with new/existing  mental and physical diagnosis's.  Having many health issues and being on disability since , I'm finding it increasingly difficult coping with daily life and having no quality of life in all areas.\" The problem(s) began \"98.\" Patient stated that it feels like issues keep adding up since . \"I need to move forward and get on with my life.\"   \"      ASSESSMENT:     Please consult  if any of the following concerns may impact admission/participation in program:     PHQ, CHAVO and PROMIS " "done within 7 days OR send upon admission if over 7 days.      [place] Suicide Severity Rating (select Lifetime/Recent):   [place] Suicide Severity Rating Scale (Lifetime/Recent)  [place] Suicide Severity Rating (Lifetime/Recent) 5/5/2020 6/11/2020 1/9/2023   Wish to be Dead (Lifetime) Yes Yes -   Comments \"When I was going through a couple of  years of counseling from a dysfunctional family about 30 years ago or more\" when patient was in treatment and there were family concerns -   Non-Specific Active Suicidal Thoughts (Lifetime) Yes Yes -   Non-Specific Active Suicidal Thought Description (Lifetime) Had thoughts of killing self - -   Most Severe Ideation Rating (Lifetime) 5 5 -   Most Severe Ideation Description (Lifetime) 35 years ago \"I just wanted to check out , I had thought of it so much and wanted to be done\" when family problems were happening -   Frequency (Lifetime) 4 (No Data) -   Duration (Lifetime) 2 (No Data) -   Controllability (Lifetime) 3 0 -   Protective Factors  (Lifetime) 2 5 -   Reasons for Ideation (Lifetime) 5 (No Data) -   Q1 Wished to be Dead (Past Month) - - yes   Q2 Suicidal Thoughts (Past Month) - - no   Q3 Suicidal Thought Method - - no   Q4 Suicidal Intent without Specific Plan - - no   Q5 Suicide Intent with Specific Plan - - no   Q6 Suicide Behavior (Lifetime) - - yes   Within the Past 3 Months? - - no   Level of Risk per Screen - - moderate risk   RETIRED: 1. Wish to be Dead (Recent) No No -   RETIRED: 2. Non-Specific Active Suicidal Thoughts (Recent) No No -   3. Active Suicidal Ideation with any Methods (Not Plan) Without Intent to Act (Lifetime) Yes Yes -   RETIRE: Active Suicidal Ideation with any Methods (Not Plan) Description (Lifetime) Took many pills of Prozac and was sent to the ED 30 years prior -   RETIRED: 3. Active Suicidal Ideation with any Methods (Not Plan) Without Intent to Act (Recent) No - -   RETIRE: 4. Active Suicidal Ideation with Some Intent to Act, Without " Specific Plan (Lifetime) Yes Yes -   RETIRE: Active Suicidal Ideation with Some Intent to Act, Without Specific Plan Description (Lifetime) Took many pills of Prozac and was sent to the ED - -   4. Active Suicidal Ideation with Some Intent to Act, Without Specific Plan (Recent) No - -   RETIRE: 5. Active Suicidal Ideation with Specific Plan and Intent (Lifetime) Yes Yes -   RETIRE: Active Suicidal Ideation with Specific Plan and Intent Description (Lifetime) Took many pills of Prozac and was sent to the ED over 30 years ago - -   RETIRED: 5. Active Suicidal Ideation with Specific Plan and Intent (Recent) No - -   Most Severe Ideation Rating (Past Month) NA - -   Frequency (Past Month) NA - -   Duration (Past Month) NA - -   Controllability (Past Month) NA - -   Protective Factors (Past Month) NA - -   Reasons for Ideation (Past Month) NA - -   Actual Attempt (Lifetime) Yes Yes -   Actual Attempt Description (Lifetime) Took a bunch of pills patient reported overdosing on Prozac about thirty years ago -   Total Number of Actual Attempts (Lifetime) 1 1 -   Actual Attempt (Past 3 Months) No No -   Has subject engaged in non-suicidal self-injurious behavior? (Lifetime) Yes Yes -   Has subject engaged in non-suicidal self-injurious behavior? (Past 3 Months) No No -   Interrupted Attempts (Lifetime) No No -   Interrupted Attempts (Past 3 Months) No No -   Aborted or Self-Interrupted Attempt (Lifetime) No No -   Total Number Aborted or Self Interrupted Attempts (Lifetime) 0 - -   Aborted or Self-Interrupted Attempt (Past 3 Months) No No -   Preparatory Acts or Behavior (Lifetime) No No -   Preparatory Acts or Behavior (Past 3 Months) No No -   Most Recent Attempt Date 52504 (No Data) -   Comments - 30 years prior -   Most Recent Attempt Actual Lethality Code 2 0 -   Comments This is a guess/pt. doesn't recall exact month or day - -   Most Lethal Attempt Actual Lethality Code 2 - -   Comments only 1 attempt/same attempt as  most recent & initial - -   Initial/First Attempt Date 52504 - -   Initial/First Attempt Actual Lethality Code 2 - -       LOCUS completed for recommended level of care? no Current WHODAS was assigned and patient needs the following level of care based on score 46.     IOP: 17-19; PHP: 20-22     Copy/Paste current Safety Plan to the BEH TX PLAN ENCOUNTER. yes    Safety status/concerns: no     Substance use concerns: no    Pertinent Medical/Nutritional concerns: see RN Screen    Review Tele-Health Requirements (including secure environment, confidentiality, in-state status, equipment needs and process - encourage MyChart): yes    Confirm Emergency Contact listed in the SnapShot/Demographics with patient and notify OBC if an update is required. yes ;   Justin Cleary Spouse [phone] NONE [phone]    Aleida Cleary Sister-in-Law [phone]          Paper or Docusign requirements for ROIs, e-PAULETTE, emergency contact, etc have been completed? no - resent  If not, do upon admission.     Does patient have FMLA or Short-Term Disability requests/plans? no  NOTE: Whenever possible, FMLA or Short-Term Disability paperwork needs to be managed/completed by the patient's community provider.   Exceptions: Patient does not have a community provider AND request is specific to mental health and time off for the duration of the program participation.    Notify RN Triage as soon as possible.     Care Providers/Medication Management Needs:     Does patient have a current community or other [facility] provider prescribing medications for mental health? yes  NOTE: Delete below if not applicable:    Psychiatric Provider (or PCP if managing MH meds)/Name: Dr Dubois  [facility]  Last appointment:  Jan 2023  Next appointment:  To be scheduled     NOTE: Inform patients, program is temporary and we will not be transferring care. Patient's should continue to see their community provider.       Individual  Therapist/Name:  Mary Kay Weir   [facility]location[facility]  Last appointment:  2/2 or 2/3/23  Next appointment:  2/7/23     Patient will continue to see above provider while participating in program: yes        RECOMMENDATIONS:     Patient Admission Completed: yes    Care Team, referrals made/needed: no  PCP: Chikis Randle  NOTE: Notify RN, as needed, to make internal referrals.                                                             Completed by: Yanet Ambrose RN

## 2023-02-03 NOTE — PROGRESS NOTES
55+ Treatment Program:  Individualized Treatment Plan     Date of Plan: 2023    Name: Randi Cleary MRN: [id number]    : 1955     Program: 55+ Outpatient Program    Clinical Track: aiop-am (Ting Perez, [facility], Sheeba Velez, XOCHITLFT, Patsy Mac, SW, Rubén Vazquez RN, Gloria Rico MA, OTR/L, Pamela Cary, [facility], BC-DMT, Luh Hare, [facility])    DSM5 Diagnosis:   Principal DSM5 Diagnoses  (Sustained by DSM5 Criteria Listed Above):   296.33 (F33.2) Major Depressive Disorder, Recurrent Episode, Severe With anxious distress  300.02 (F41.1) Generalized Anxiety Disorder    Rule out:  296.89 Bipolar II Disorder vs. 314.01 (F90.2) Attention-Deficit/Hyperactivity Disorder   Combined presentation     55+ Multidisciplinary Team Members:  Dr. Roland Das, Sophy Davidson RN;  ASHKAN Prasad; EWELINA Reyna; LIZBETH Garcia, [facility]; Mallorie Evans NP  Randi Cleary will participate in the 55+ Program 3 days per week, 3 hours per day.   Anticipated duration/discharge: 12 weeks    Due to COVID-19, services will be delivered via telemedicine until further notice.     Program Start Date: 2023  Anticipated Discharge Date: 2023 (pending authorization/clinical change 2023 Tentative discharge date is 2023. SP   Updated discharge date to Friday, May 5 for mood stabilization and mental health education for recovery. SP     Review Date: Does Randi Cleary continue to meet criteria to participate in the 55+ Program, 3 days per week; 3 hours per day?   2023 yes   2023 Yes-LIZBETH Prasad, ASHKAN (SP)  11:20 am   3/1/2023 staffing Yes - Roland Das MD on 3/1/2023 at 8:13 AM   2023 staffing Yes - Roland Das MD on 2023 at 8:19 AM   2023 Yes-pts treatment plan goals were reviewed and updated with pt. LIZBETH Prasda, LICSW 2023 11:00 am   2023 Winnie would like to extend IOP for one more week. discharge now  "Friday 5/5/2023 for education and support. SP    5/3/2023 staffing Anticipate discharge as planned - Roland Das MD on 5/3/2023 at 8:17 AM   5/8/2023 Winnie discharged from [facility] on [facility]/5/2023 with a relapse prevention plan and follow up appointments including the 55+ Aftercare Clinic. She will start the clinic on 5/18/2023. SP     Client Strengths:  caring, creative, educated, has a previous history of therapy, insightful, intelligent, motivated and support of family, friends and providers    Client Participation in Plan:  Contributed to goals and plan   Attended individual treatment plan meeting on 2/8/2023  Received copy of treatment plan     Areas of Vulnerability:  Anxiety  Depressive symptoms     Long-Term Goals:  Knowledge about illness and management of symptoms   Maintenance of personal safety      Abuse Prevention Plan:  Safe, therapeutic environment   Safety coping plan as needed   Education regarding illness and skill development   Coordination with care providers     Discharge Criteria:  Satisfactory progress toward treatment goals   Has a discharge plan in place   Regular attendance as scheduled     Areas of Treatment Focus       Why are you seeking treatment/What do you want to focus on during treatment? Chief Complaint:   The reason for seeking services at this time is: \"Coping and living with new/existing  mental and physical diagnosis's.  Having many health issues and being on disability since 1998, I'm finding it increasingly difficult coping with daily life and having no quality of life in all areas.\" The problem(s) began \"01/01/98.\" Patient stated that it feels like issues keep adding up since 1998. \"I need to move forward and get on with my life.\" Patient stated she has difficulty managing her health problems and is not communicating her needs well. Her therapist has been helping her identify and work on it. Patient doesn't have PCA or home healthcare provider.      Area of Treatment " "Focus:   Personal Safety  Start Date:    2/8/2023    Goal:  Target Date: 4/5/23, discharge Status: Completed  Client will notify staff when needing assistance to develop or implement a coping plan to manage suicidal or self injurious urges.  Client will use coping plan for safety, as needed.    My Internal Coping Strategies include the following:  She uses music to help herself feel better. Other times, she just tries to work through it with her mind. She will start reading, try to think of other people, or inspirations.     Safety  What is important to me and makes life worth living: Her  Sidney and 3 good girlfriends she has known for years.  And \"My Clifford,\" her cat.     Progress:  2/8/2023: Met with care team. Set and discussed goals. Progress notes will be updated during treatment days to reflect current safety status. Pt was agreeable to goal when described. Continue.    4/5 & 4/6 Winnie denies S/I or safety issues. She is using coping skills and has accessed Crisis hotline as needed. She has been using [facility] Crisis number as needed. We discussed [facility] as a resource if needed.  She is able to be responsible for herself and her safety. SP     5/8/2023 Winnie GALDAMEZ Denies S/I or safety issues. She has a crisis plan with community resources if/when needed. SP     Treatment Strategies:   Assist clients in establishing / strengthening support network  Assess / reassess level of potential for harm to self or others  Engage in safety planning when indicated  Facilitate increased self awareness      Area of Treatment Focus:   Symptom Stabilization and Management  Start Date:    2/8/2023    Goal:  Target Date: 4/5/23, discharge Status: Completed  Will report on symptoms and identify 1-3 skills to use to manage mental health, e.g. work on structure, interpersonal skills, and learn and practice skills to address ptsd, etc.         Progress:    \"Last couple years [consider a lot of different goals]. " "Continue to learn and apply skills to manage anxiety, bipolar and PTSD. I recognize the behaviors after I was diagnosed. I need to learn more about it. I think I'm having the most problems with the PTSD.Increase positive daily structure (balanced with responsibilities and leisure); either you make it or you dont, that bump in the road, you try to salvage the rest of the day. If I dont have the structure I can't get to the warm pool. I'm terrible at interpersonal skills that's one of the biggest thing I'm going through right now.\" Continue.    4/6/2023 Winnie is aware of her pattern of mood symptoms with depression and anxiety. She continues to use coping skills for  Symptom management. She also continues to use skills for pain management. Winnie continues to focus on ways to structure her time incorporating  task management including to make follow up appointments. The weather has been a barrier to be able to attend. She [facility] with [facility] and is following up with their recommendations. She will engage in walking and swimming at the pool now that the weather is better. She has been able to engage in exercises at home and consulting with PT. She also is aligned with the [employer]. She is also focused on organization at home.  Winnie continues to assert needs with  at home. She found the boudnary topic helpful for interpersonal relationship effectiveness.   She was encouraged to recconect with her friends for socialization to reduce social isolation. SP    5/8/2023 Winnie continues to be aware of her pattern of mood symptoms, helpful coping skills for symptom management and has a relapse prevention plan. She continues to also be aware of the co-morbid ohysical health issues and how it impacts her anxiety. She continues to consult with her outpatient medical providers and was able to obtain Dr. Harris, Rheumotoid Arthritis MD. She would like to add swimming as part of her routine. She continues with " task management at home. SHe continues to be assertive with her . Winnie is aware of her feelings and can be self compassionate with her medical trauma. [facility].  Symptoms per DA  Depression: Change in sleep, Lack of interest, Excessive or inappropriate guilt, Change in energy level, Difficulties concentrating, Feelings of hopelessness, Feelings of helplessness, Low self-worth, Ruminations, Irritability, Feeling sad, down, or depressed, Withdrawn and Frequent crying  Olga: Pressured speech - Patient had Serotonin Syndrome was 2019 or 2020 and they diagnosed her with Bipolar. She stated her mood has been more up and down since then, but she also stated she has always been like this. She stated she agrees with diagnosis of Bipolar. She identified her symptoms as: she goes on and on with talking. She stated she knows to stop but keeps talking. She has money problems and overspending. She will go from being in a really good mood to quick anger, and she goes through these feelings in a day.  noted that several symptoms sound like ADHD. Patient denied ADHD diagnoses, but explained she was prescribed Adderall in the past.  Anxiety: Excessive worry, Nervousness, Physical complaints, such as headaches, stomachaches, muscle tension, Psychomotor agitation, Ruminations, Poor concentration and Irritability  Panic: Palpitations - Patient stated during high anxiety she can't sleep and it causes headaches. She needs to be alone to calm down and catch her breath.   Post Traumatic Stress Disorder:  No Current Symptoms - Patient stated she agrees more with a PTSD diagnosis than with the Bipolar diagnosis. Patient stated she sees PTSD starting from far back. Patient denied flashbacks, nightmares, and dissociation. She stated she doesn't think about it all the time.   ADD / ADHD: Interrupts, Restlessness/fidgety and Hyperverbal - Patient denied ADHD is diagnosed. She explained  "she was prescribed Adderall many years ago, but she doesn't know why. Dr. Olivera was prescribing Adderall. She doesn't remember if it helped or not because she was on a lot of medications in the 1990s - 2000s. She couldn't remember if she took Adderall for long because it was too expensive. At that time, her  wondered why she was taking Adderall? 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 Olivera wouldn't send Dr. Dubois the medical records. Patient stated she was also on Lamictal.    Obsessive Compulsive Disorder: Patient stated she scared of fire and could check the stove thousands of time, but doesn't.            Treatment Strategies:   Assess / reassess for appropriate therapy program involvement, encourage participation in therapies  Facilitate increased self awareness  Provide education regarding symptoms management  Teach adaptive coping skills and communication skills      Area of Treatment Focus:   Community Resources / Support and Discharge Planning  Start Date:    2/8/2023    Goal:  Target Date: 4/5/23, discharge Status: Completed  Will develop an aftercare / transition plan by discharge, engaging in at least 1-2 behaviors supporting wellness and/or connection, e.g. address structure, increase support, etc.     My prescriber: Dr. Dubois  [facility] therapist: Mary Kay Weir   Progress:  2/8/2023: Met with care team. Discussed and set goals. Pt was given resources (e.g. Psychology Today, [employer], MEE, etc) in [facility] discharge planning group. Regarding professional supports, \"I just lost my primary of 30 years. She s very young. I ll see her in March.\" Reports having individual therapists and psychiatrist.  I see him every two months. He s through the pain clinic.  Regarding personal supports, \"This is [place] (cat). I don t have a good support system, it s in crisis right now. It s been a rude awakening. I have a friend that I ve known since she was 1 and I was [age]. I have a girl from " "high school. So we ve known each other for 50 years. But, with death and  s dying and this and that, the dynamics are really changing for everybody. I have no family. I have sister-in-law, she s a big part. But she just recently remarried. [Gautam] passed away in his 50s from cancer, six months and he was gone. You know, I ll tell ya, my anniversary is coming up on Friday, we were  2005. Nothing is the same, that s changing too. Clifford is my kid.\" Regarding wellness, \"I m reevaluating my nutrition, I ve been slowly transitioning into an anti-inflammatory diet. I keep getting diagnosed with all these things - arthritis, auto immune issues. For me I find it really hard to keep on a schedule. Since I ve been recovering from surgery it was [place] around here for about a year. Exercise I [facility] the [facility] [facility] and I m a swimmer but I m finding everything difficult right now. I go to PT there too. I m not on any psych medications. I worry about my pain meds. I ve been on and off. Sleep, I don t.\" Continue.    4/6/2023 Winnie has a relapse prevention plan with community resources. She will consult with her therapist on Friday. She will consult with Dr. Dubois for medication management on 4/11/2023. We discussed MEE and DBSA supprt groups. She continues to make follow up appointments for physical health including the [facility], PT and the Pain Clinic. She is finding the [facility] helpful for pain management and to ease her anxiety symptoms. Toñito discussed case management via [facility]. SP      5/8/2023 Winnie has a relapse prevention plan with community resources. She continues to follow up with Dallas Gomez, therapist and outpatient MD for medication management. She has multiple providers who assist with physical health recovery: [facility], [facility], Dr. Harris, Dr. Dubois. She would like to get out of the house more now that the weather is not a barrier. Winnie will " start the 55+ Aftercare Clinic. She is also aware of MEE, DBSA, [facility]. SP    Treatment Strategies:   Assist clients in establishing / strengthening support network  Assist with discharge planning  Facilitate increased self awareness  Provide education regarding resources       Gloria Rico MA, OTR/L 2/8/2023    LIZBETH Prasad, LICSW  2/17/2023 11:20 am      NOTE: Signatures are available on the Acknowledgement of Treatment Plan located in Chart Review    The Individualized Treatment Plan Signature Page has been routed to the provider for co-sign.    I have reviewed the patient's Individualized Treatment Plan and agree with the current goals, interventions and level of care.     Roland Das MD  2/10/2023, 3/1/2023, 4/5/2023, 5/3/2023

## 2023-02-05 NOTE — PROGRESS NOTES
Neurosurgery progress note:    A/P:  Winnie is a 67yo F who is POD#1 s/p CERVICAL 3-CERVICAL 6 LEFT OPEN DOOR LAMINOPLASTY AND LEFT CERVICAL 4-5 AND CERVICAL 6-7 POSTERIOR FORAMINOTOMY by Dr Gregory. Preoperatively she complained of imbalance, numbness and tingling in her hands, fine motor difficulties, left shoulder pain, radiating left arm pain and numbness.    Winnie has significant posterior neck pain today which is to be expected given her procedure. Her left arm pain is otherwise gone postop. Stable numbness/tingling in both hands and into her left arm from preop. No concerns based on her neuro exam today. Of note, available pain meds do not appear to have been maximized. She has not recd valium. Does not appear PCA was started. Please continue KARLEY drain. Obtain cervical XR. Pain team consult today. Change dressing as needed. >30mins spent w patient at time of exam    Plan:  1. Pain control (tylenol atc, valium 5mg Q4hrs prn, robaxin at bedtime prn, oxycodone 10mg Q4hrs prn, iv dilaudid). Appreciate pain team assistance with med mgmt. Consider psych consult if anxiety remains a significant factor.  2. PT/OT eval, ambulate patient, up to chair as much as able  3. Cervical collar when upright or out of bed. Ok for collar to be off in bed  4. Cervical XR complete, reviewed.  5. Continue KARLEY drain, recording outputs Qshift even if 0  6. dvt ppx lovenox to start POD#2 12/23  7. Care management consult for dispo planning    Subjective:  Winnie reports severe bilateral posterior neck pain radiating to her ears. Worse with movement. Does not feel like oxycodone overnight helped at all. Otherwise no further left arm pain from preop. She has stable numbness/tingling in both hands and up her left arm from preop. States her dressing was changed. She denies leg symptoms or arm weakness.    Exam:     General: lying in bed, appears anxious. In pain with neck movement    Strength:  5/5 bilaterally throughout upper and lower  extremities, all planes.    Sensation: decreased to light touch throughout both hands and lateral aspect of left arm.    Incision: covered with clean dry dressing. Did not remove dressing today  KARLEY drain: intact to bulb suction with sanguinous output in bulb    MAEx4    Imaging:  Personally reviewed images  EXAM: XR CERVICAL SPINE 2/3 VWS  LOCATION: [facility]  DATE/TIME: 12/22/2021 9:37 AM     INDICATION: postop laminoplasty  COMPARISON: None.  TECHNIQUE: CR Cervical Spine.                                                                      IMPRESSION:   Postsurgical changes laminoplasty is at C3-C6. Surgical drain within the operative bed. Anterolisthesis of C7 on T1 measuring 2 mm. The vertebral bodies of the cervical spine otherwise have normal stature and alignment. Degenerative endplate change and   anterior marginal osteophytes at C3/C4-C6/C7.  Multilevel degenerative disc disease of the cervical spine disc height loss, most pronounced at C4/C5-C6/C7. No prevertebral soft tissue swelling. The partially imaged lung apices are unremarkable. Soft   tissues unremarkable.    Alexia Rubin FNP-C  [facility]  O. [phone]     No

## 2023-02-06 ENCOUNTER — HOSPITAL ENCOUNTER (OUTPATIENT)
Dept: BEHAVIORAL HEALTH | Facility: CLINIC | Age: 68
Discharge: HOME | End: 2023-02-06
Attending: PSYCHIATRY & NEUROLOGY
Payer: MEDICARE

## 2023-02-06 DIAGNOSIS — F41.1 GENERALIZED ANXIETY DISORDER: ICD-10-CM

## 2023-02-06 DIAGNOSIS — F31.81 BIPOLAR 2 DISORDER (H): Primary | ICD-10-CM

## 2023-02-06 PROBLEM — F10.21 ALCOHOL USE DISORDER, MODERATE, IN SUSTAINED REMISSION (H): Status: ACTIVE | Noted: 2022-02-23

## 2023-02-06 PROCEDURE — 99417 PROLNG OP E/M EACH 15 MIN: CPT | Mod: 95 | Performed by: PSYCHIATRY & NEUROLOGY

## 2023-02-06 PROCEDURE — 90853 GROUP PSYCHOTHERAPY: CPT | Mod: GT

## 2023-02-06 PROCEDURE — 90853 GROUP PSYCHOTHERAPY: CPT | Mod: PO

## 2023-02-06 PROCEDURE — 99205 OFFICE O/P NEW HI 60 MIN: CPT | Mod: 95 | Performed by: PSYCHIATRY & NEUROLOGY

## 2023-02-06 NOTE — GROUP NOTE
Psychotherapy Group Note    PATIENT'S NAME: Randi Cleary  MRN:   [id number]  :   1955  ACCT. NUMBER: [id number]  DATE OF SERVICE: 23  START TIME:  9:00 AM  END TIME:  9:50 AM  FACILITATOR: Patsy Mac LGSW  TOPIC: MH EBP Group: Specialty Awareness  [facility]+ [facility]  TRACK: A-IOP    NUMBER OF PARTICIPANTS: 3    Summary of Group / Topics Discussed:  The purpose of this specialty topic is to help patients identify the?rules and expectations of the intensive outpatient program.?The focus will be on helping patients?to better understand how?to share and process emotions, discuss sensitive topics, regulate emotions during group sessions, and manage attendance expectations.???     Discuss program Welcome Letter?(including rules and expectations)     Discuss group-appropriate behaviors versus therapy-interfering behaviors ?     Identify strategies to share and regulate emotions during programming     Patient Session Goals / Objectives:  Patient?will:?     Verbalize understanding of?group rules and expectations     Verbalize understanding of?appropriate behaviors in group     Verbalize understanding of how to process emotions in group settings?                                       Service Modality:  Video Visit     Telemedicine Visit: The patient's condition can be safely assessed and treated via synchronous audio and visual telemedicine encounter.      Reason for Telemedicine Visit: Services only offered telehealth    Originating Site (Patient Location): Patient's home    Distant Site (Provider Location): Provider Remote Setting- Home Office    Consent:  The patient/guardian has verbally consented to: the potential risks and benefits of telemedicine (video visit) versus in person care; bill my insurance or make self-payment for services provided; and responsibility for payment of non-covered services.     Patient would like the video invitation sent by:  My Chart    Mode of  Communication:  Video Conference via Medical Zoom    As the provider I attest to compliance with applicable laws and regulations related to telemedicine.            Patient Participation / Response:  Fully participated with the group by sharing personal reflections / insights and openly received / provided feedback with other participants.    Demonstrated understanding of topics discussed through group discussion and participation, Identified / Expressed readiness to act on skill suggestions discussed in topic and Verbalized understanding of ways to proactively manage illness    Treatment Plan:  Patient has an initial individualized treatment plan that was created as part of their diagnostic assessment / admission process.  A master individualized treatment plan is in the process of being developed with the patient and multi-disciplinary care team.    Patsy Mac LGSW
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
## 2023-02-06 NOTE — GROUP NOTE
Process Group Note    PATIENT'S NAME: Randi Cleary  MRN:   [id number]  :   1955  ACCT. NUMBER: [id number]  DATE OF SERVICE: 23  START TIME: 11:00 AM  END TIME: 11:50 AM  FACILITATOR: Sheeba Velez LMFT  TOPIC:  Process Group    Diagnoses:  296.33 (F33.2) Major Depressive Disorder, Recurrent Episode, Severe With anxious distress  4. Other Diagnoses that is relevant to services:   300.02 (F41.1) Generalized Anxiety Disorder    5. Provisional Diagnosis:  296.89 Bipolar II Disorder vs. 314.01 (F90.2) Attention-Deficit/Hyperactivity Disorder   Combined presentation       [facility]  TRACK: [facility]    NUMBER OF PARTICIPANTS: 3                                      Service Modality:  Video Visit     Telemedicine Visit: The patient's condition can be safely assessed and treated via synchronous audio and visual telemedicine encounter.      Reason for Telemedicine Visit: Patient has requested telehealth visit    Originating Site (Patient Location): Patient's home    Distant Site (Provider Location): Provider Remote Setting- Home Office    Consent:  The patient/guardian has verbally consented to: the potential risks and benefits of telemedicine (video visit) versus in person care; bill my insurance or make self-payment for services provided; and responsibility for payment of non-covered services.     Patient would like the video invitation sent by:  My Chart    Mode of Communication:  Video Conference via Medical Zoom    As the provider I attest to compliance with applicable laws and regulations related to telemedicine.                                  Data:    Session content: At the start of this group, patients were invited to check in by identifying themselves, describing their current emotional status, and identifying issues to address in this group.   Area(s) of treatment focus addressed in this session included Symptom Management, Personal Safety, Develop /  Improve Independent Living Skills and Develop Socialization / Interpersonal Relationship Skills.    Patient reported feeling positive about some aspects and still challenged with other aspects. Patient reported feeling in the middle of the road right now. Patient reported feeling grateful that her medication she has changed to has been helpful. Patient reported goals are to work on being ok being alone. Patient stated skills are to stay busy. Patient stated barriers are morning and night. Skills are to have things ready to stay busy. Patient denied safety concerns or chemical use. Patient reported feeling proud of herself for having a meeting for work and choosing to attend in office rather than attending on Teams.    Therapeutic Interventions/Treatment Strategies:  Psychotherapist offered support, feedback and validation and reinforced use of skills. Treatment modalities used include Motivational Interviewing, Cognitive Behavioral Therapy and Dialectical Behavioral Therapy. Interventions include Behavioral Activation: Encouraged strategies to reduce individual procrastination and increase motivation by increasing goal-directed activities to enhance mood and reduce symptoms. and Emotions Management:  Reinforced the purpose and biological basis of emotions, Discussed barriers to emotional regulation and Increased awareness of daily mood patterns/changes.    Assessment:    Patient response:   Patient responded to session by accepting feedback, giving feedback, listening, focusing on goals, being attentive and accepting support    Possible barriers to participation / learning include: and no barriers identified    Health Issues:   Yes: Pain, Associated Psychological Distress       Substance Use Review:   Substance Use: No active concerns identified.    Mental Status/Behavioral Observations  Appearance:   Appropriate   Eye Contact:   Good   Psychomotor Behavior: Normal   Attitude:   Cooperative    Orientation:   All  Speech   Rate / Production: Normal    Volume:  Normal   Mood:    Normal  Affect:    Appropriate   Thought Content:   Clear  Thought Form:  Coherent  Logical     Insight:    Good     Plan:     Safety Plan: No current safety concerns identified.  Recommended that patient call 911 or go to the local ED should there be a change in any of these risk factors.     Barriers to treatment: None identified    Patient Contracts (see media tab):  None    Substance Use: Not addressed in session     Continue or Discharge: Patient will continue in Adult Day Treatment (ADT)  as planned. Patient is likely to benefit from learning and using skills as they work toward the goals identified in their treatment plan.      Sheeba Velez, EWELINA  February 6, 2023

## 2023-02-07 ENCOUNTER — HOSPITAL ENCOUNTER (OUTPATIENT)
Dept: BEHAVIORAL HEALTH | Facility: CLINIC | Age: 68
Discharge: HOME | End: 2023-02-07
Attending: PSYCHIATRY & NEUROLOGY
Payer: MEDICARE

## 2023-02-07 ENCOUNTER — VIRTUAL VISIT (OUTPATIENT)
Dept: PSYCHOLOGY | Facility: CLINIC | Age: 68
End: 2023-02-07
Payer: MEDICARE

## 2023-02-07 DIAGNOSIS — F41.1 GENERALIZED ANXIETY DISORDER: ICD-10-CM

## 2023-02-07 DIAGNOSIS — F43.9 TRAUMA AND STRESSOR-RELATED DISORDER: ICD-10-CM

## 2023-02-07 DIAGNOSIS — F31.81 BIPOLAR 2 DISORDER (H): Primary | ICD-10-CM

## 2023-02-07 PROCEDURE — 90853 GROUP PSYCHOTHERAPY: CPT | Mod: GT

## 2023-02-07 PROCEDURE — 90837 PSYTX W PT 60 MINUTES: CPT | Mod: 95 | Performed by: SOCIAL WORKER

## 2023-02-07 NOTE — GROUP NOTE
Process Group Note    PATIENT'S NAME: Randi Cleary  MRN:   [id number]  :   1955  ACCT. NUMBER: [id number]  DATE OF SERVICE: 23  START TIME: 11:00 AM  END TIME: 11:30 AM  FACILITATOR: Sheeba Velez LMFT  TOPIC:  Process Group    Diagnoses:  296.33 (F33.2) Major Depressive Disorder, Recurrent Episode, Severe With anxious distress  4. Other Diagnoses that is relevant to services:   300.02 (F41.1) Generalized Anxiety Disorder    5. Provisional Diagnosis:  296.89 Bipolar II Disorder vs. 314.01 (F90.2) Attention-Deficit/Hyperactivity Disorder   Combined presentation       [facility]  TRACK: AIOP    NUMBER OF PARTICIPANTS: 2                                      Service Modality:  Video Visit     Telemedicine Visit: The patient's condition can be safely assessed and treated via synchronous audio and visual telemedicine encounter.      Reason for Telemedicine Visit: Patient has requested telehealth visit    Originating Site (Patient Location): Patient's home    Distant Site (Provider Location): Provider Remote Setting- Home Office    Consent:  The patient/guardian has verbally consented to: the potential risks and benefits of telemedicine (video visit) versus in person care; bill my insurance or make self-payment for services provided; and responsibility for payment of non-covered services.     Patient would like the video invitation sent by:  My Chart    Mode of Communication:  Video Conference via Medical Zoom    As the provider I attest to compliance with applicable laws and regulations related to telemedicine.                                  Data:    Session content: At the start of this group, patients were invited to check in by identifying themselves, describing their current emotional status, and identifying issues to address in this group.   Area(s) of treatment focus addressed in this session included Symptom Management, Personal Safety, Develop /  Improve Independent Living Skills and Develop Socialization / Interpersonal Relationship Skills.    Patient reported having a bad morning and was planning on not coming to group. Patient reported she has been processing triggers for panic attacks. Patient stated her goal for today is to work through a relationship issue and stated she is feeling vindicated. Patient utilized processing time to discuss her friend group and the issues that occurred in that. 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 Patient reported feeling excluded at those times.       Therapeutic Interventions/Treatment Strategies:  Psychotherapist offered support, feedback and validation and reinforced use of skills. Treatment modalities used include Motivational Interviewing, Cognitive Behavioral Therapy and Dialectical Behavioral Therapy. Interventions include Emotions Management:  Reinforced the purpose and biological basis of emotions, Discussed barriers to emotional regulation and Increased awareness of daily mood patterns/changes and Relationship Skills: Encouraged development and maintenance  of healthy boundaries and Discussed strategies to promote healthier understanding of interpersonal relationships.    Assessment:    Patient response:   Patient responded to session by accepting feedback, listening, focusing on goals, being attentive and accepting support    Possible barriers to participation / learning include: and no barriers identified    Health Issues:   Yes: Pain, Associated Psychological Distress       Substance Use Review:   Substance Use: No active concerns identified.    Mental Status/Behavioral Observations  Appearance:   Appropriate   Eye Contact:   Good   Psychomotor Behavior: Normal   Attitude:   Cooperative   Orientation:   All  Speech   Rate / Production: Normal    Volume:  Normal   Mood:    Anxious  Depressed   Affect:    Appropriate    Thought Content:   Rumination  Thought Form:  Coherent  Logical     Insight:    Good     Plan:     Safety Plan: No current safety concerns identified.  Recommended that patient call 911 or go to the local ED should there be a change in any of these risk factors.     Barriers to treatment: None identified    Patient Contracts (see media tab):  None    Substance Use: Not addressed in session     Continue or Discharge: Patient will continue in 55+ Program (55+) as planned. Patient is likely to benefit from learning and using skills as they work toward the goals identified in their treatment plan.      EWELINA Marino  February 7, 2023

## 2023-02-07 NOTE — GROUP NOTE
Psychoeducation Group Note    PATIENT'S NAME: Randi Cleary  MRN:   [id number]  :   1955  ACCT. NUMBER: [id number]  DATE OF SERVICE: 23  START TIME: 10:00 AM  END TIME: 10:50 AM  FACILITATOR: Patsy Mac LGSW; Carlos Vazquez RN  TOPIC:  Wellness Group: Medication Education and Management  [facility]+ [facility]  TRACK: [facility]    NUMBER OF PARTICIPANTS: 4    Summary of Group / Topics Discussed:  Medication Educations and Management:  Medication Jeopardy: Patients provided education regarding medication safety, antidepressants, side effects, neuroleptics, expected medication outcomes, knowledge of diagnosis, symptoms, and symptom management through an engaging jeopardy-style format.     Patient Session Goals / Objectives:    ? Participated in team-based Jeopardy game  ? Identified strategies for safe use, handling, and disposal of medications  ? Discussed basic aspects of medication safety, side effects, adverse outcomes and contraindications                                    Service Modality:  Video Visit     Telemedicine Visit: The patient's condition can be safely assessed and treated via synchronous audio and visual telemedicine encounter.      Reason for Telemedicine Visit: Services only offered telehealth    Originating Site (Patient Location): Patient's home    Distant Site (Provider Location): Provider Remote Setting- Home Office    Consent:  The patient/guardian has verbally consented to: the potential risks and benefits of telemedicine (video visit) versus in person care; bill my insurance or make self-payment for services provided; and responsibility for payment of non-covered services.     Patient would like the video invitation sent by:  My Chart    Mode of Communication:  Video Conference via Medical Zoom    As the provider I attest to compliance with applicable laws and regulations related to telemedicine.                               Patient Participation /  Response:  Fully participated with the group by sharing personal reflections / insights and openly received / provided feedback with other participants.     Demonstrated understanding of topics discussed through group discussion and participation, Identified / Expressed personal readiness to practice skills and Verbalized understanding of medication education and management topic    Treatment Plan:  Patient has an initial individualized treatment plan that was created as part of their diagnostic assessment / admission process.  A master individualized treatment plan is in the process of being developed with the patient and multi-disciplinary care team.    Patsy Mac LGSW

## 2023-02-07 NOTE — PROGRESS NOTES
[facility]                                     Progress Note    Patient Name: Randi Cleary  Date: 2/7/23         Service Type: Individual     Session Start Time: 3:02 PM  Session End Time: 3:55 PM     Session Length: 53 minutes    Session #: 159    Attendees: Client attended alone    Service Modality:  Video Visit:      Provider verified identity through the following two step process.  Patient provided:  Patient is known previously to provider    Telemedicine Visit: The patient's condition can be safely assessed and treated via synchronous audio and visual telemedicine encounter.      Reason for Telemedicine Visit: Patient convenience (e.g. access to timely appointments / distance to available provider)    Originating Site (Patient Location): Patient's home    Distant Site (Provider Location): Provider Remote Setting- Home Office    Consent:  The patient/guardian has verbally consented to: the potential risks and benefits of telemedicine (video visit) versus in person care; bill my insurance or make self-payment for services provided; and responsibility for payment of non-covered services.     Patient would like the video invitation sent by:  My Chart    Mode of Communication:  [facility] Conference [facility]    Distant Location (Provider):  Off-site    As the provider I attest to compliance with applicable laws and regulations related to telemedicine.    DATA  Extended Session (53+ minutes): PROLONGED SERVICE IN THE OUTPATIENT SETTING REQUIRING DIRECT (FACE-TO-FACE) PATIENT CONTACT BEYOND THE USUAL SERVICE:    - Patient's presenting concerns require more intensive intervention than could be completed within the usual service  Interactive Complexity: No  Crisis: No        Progress Since Last Session (Related to Symptoms / Goals / Homework):   Symptoms: Patient is continuing to have challenges with her mood, but has some hope with group having started    Homework: Progress made  Call about  couples counseling  Talk to your primary care doctor about what's on your list (mood, neck, arthritis)     Episode of Care Goals: Satisfactory progress - ACTION (Actively working towards change); Intervened by reinforcing change plan / affirming steps taken     Current / Ongoing Stressors and Concerns:   Patient is currently socially isolated. She has a conflictual relationship with her .  She is getting minimal physical activity.  She had recent eye surgery and shoulder surgery.     Treatment Objective(s) Addressed in This Session:   Patient will increase frequency of engaging her in ADLs.  Patient will track and record at least 5 pleasant exchanges with . Patient will be able to identify at least 5 positive traits about her .  Patient will reduce level of depressive and anxious features as evidenced by reduction in score on her CHAVO-7 and PHQ-9 (scores of 15 and 16 at first measurment, respectively).     Intervention:   Solution Focused:   Talked about having started group, processing this. Talked about a situation with a peer and how she's gong to speak up to herself. Talked about her hopes to go to [place] and how this hope keeps her going.  Also processed PT and how it's helping her.       The following assessments were completed by patient for this visit:  PHQ9: of note, patient reported she was in a hurry and didn't read all questions, she reports no suicidal ideation  PHQ-9 SCORE 12/30/2022 1/5/2023 1/10/2023 1/11/2023 1/11/2023 1/30/2023 2/5/2023   PHQ-9 Total Score MyChart 18 (Moderately severe depression) 16 (Moderately severe depression) 16 (Moderately severe depression) - 15 (Moderately severe depression) 13 (Moderate depression) 18 (Moderately severe depression)   PHQ-9 Total Score 18 16 16 15 15 13 18   Some encounter information is confidential and restricted. Go to Review Flowsheets activity to see all data.     GAD7:   CHAVO-7 SCORE 12/15/2022 12/15/2022 1/5/2023 1/5/2023 1/5/2023  1/30/2023 2/5/2023   Total Score - 19 (severe anxiety) - - 17 (severe anxiety) 17 (severe anxiety) 16 (severe anxiety)   Total Score 19 19 17 17 17 17 16   Some encounter information is confidential and restricted. Go to Review Flowsheets activity to see all data.     PROMIS 10-Global Health (only subscores and total score):   PROMIS-10 Scores Only 1/5/2023 1/5/2023 2/5/2023 2/5/2023 2/5/2023 2/5/2023 2/5/2023   Global Mental Health Score 5 5 5 5 5 5 5   Global Physical Health Score 8 8 8 8 8 8 8   PROMIS TOTAL - SUBSCORES 13 13 13 13 13 13 13   Some encounter information is confidential and restricted. Go to Review Flowsheets activity to see all data.        ASSESSMENT: Current Emotional / Mental Status (status of significant symptoms):   Risk status (Self / Other harm or suicidal ideation)   Patient denies current fears or concerns for personal safety.   Patient denies current or recent suicidal ideation or behaviors.   Patient denies current or recent homicidal ideation or behaviors.   Patient denies current or recent self injurious behavior or ideation.   Patient denies other safety concerns.   Patient reports there has been no change in risk factors since their last session.     Patient reports there has been no change in protective factors since their last session.     A safety and risk management plan has been developed including: Patient consented to co-developed safety plan on 11/24/2020.  Safety and risk management plan was reviewed.   Patient agreed to use safety plan should any safety concerns arise.  A copy was made available to the patient.     Appearance:   Appropriate    Eye Contact:   Good    Psychomotor Behavior: Normal    Attitude:   Cooperative    Orientation:   All   Speech    Rate / Production: Normal/ Responsive    Volume:  Normal    Mood:    Normal   Affect:    Appropriate    Thought Content:  Clear    Thought Form:  Coherent    Insight:    Good      Medication Review:   No changes to current  psychiatric medication(s)     Medication Compliance:   Yes     Changes in Health Issues:   None reported     Chemical Use Review:   Substance Use: Chemical use reviewed, no active concerns identified Nothing used since 2021.     Tobacco Use: No current tobacco use.      Diagnosis:  1. Bipolar 2 disorder (H)    2. Generalized anxiety disorder    3. Trauma and stressor-related disorder      Collateral Reports Completed:   Not Applicable    PLAN: (Patient Tasks / Therapist Tasks / Other)  Call about couples counseling  Talk to your primary care doctor about what's on your list (mood, neck, arthritis)        There has been demonstrated improvement in functioning while patient has been engaged in psychotherapy/psychological service- if withdrawn the patient would deteriorate and/or relapse.     MICAELA SLADE   2023                                                        ______________________________________________________________________    Individual Treatment Plan    Patient's Name: Randi Cleary  YOB: 1955    Date of Creation: 20  Date Treatment Plan Last Reviewed/Revised: 22    DSM5 Diagnoses: 296.89 Bipolar II Disorder Depressed, 300.02 (F41.1) Generalized Anxiety Disorder or Adjustment Disorders  309.89 (F43.8) Other Specified Trauma and Stressor Related Disorder  Psychosocial / Contextual Factors: Patient's entire family of origin has , she now has a sister-in-law and  as support.  Relationship with  is conflictual. She is recovering from surgeries  PROMIS (reviewed every 90 days): PROMIS-10 Scores  PROMIS 10-Global Health (only subscores and total score):   PROMIS-10 Scores Only 2021 3/15/2022 3/15/2022 3/15/2022 3/24/2022 2022 2022   Global Mental Health Score 6 6 6 6 8 12 12   Global Physical Health Score 9 9 9 9 8 11 10   PROMIS TOTAL - SUBSCORES 15 15 15 15 16 23 22       Referral / Collaboration:  Referral to another  "professional/service is not indicated at this time..    Anticipated number of session for this episode of care: 50  Anticipation frequency of session: Biweekly  Anticipated Duration of each session: 53 or more minutes due to intensity of trauma symptoms  Treatment plan will be reviewed in 90 days or when goals have been changed.   There has been demonstrated improvement in functioning while patient has been engaged in psychotherapy/psychological service- if withdrawn the patient would deteriorate and/or relapse.       MeasurableTreatment Goal(s) related to diagnosis / functional impairment(s)  Goal 1: Patient will \"jumpstart, getting going with the things I need to be doing around the house as far as picking up, doing things, trying to do something every day.  Also to lessen the animosity between me and my .\"    I will know I've met my goal when my shoulders are fixed and I can see.      Objective #A (Patient Action)    Patient will increase frequency of engaging her in ADLs.  Status: Continued - Date(s): 12/10/21, 3/9/22, 6/9/22, 9/2/22, 12/1/22    Intervention(s)  Therapist will engage patient in CBT, specifically behavioral activation.    Objective #B  Patient will  track and record at least 5 pleasant exchanges with . Patient will be able to identify at least 5 positive traits about her  and how he relates to her.  Status: Continued - Date(s): 12/10/21, 3/9/22, 6/9/22, 9/2/22, 12/1/22    Intervention(s)  Therapist will teach assertiveness skills and assign homework related to relationship interactions.    Objective #C  Patient will reduce level of depressive and anxious features as evidenced by reduction in score on her CHAVO-7 and PHQ-9 (scores of 15 and 16 at first measurment, respectively).  Status: Continued - Date(s): 12/10/21, 3/9/22, 6/9/22, 9/2/22, 12/1/22    Intervention(s)  Therapist will engage patient in person-centered therapy and CBT.    Patient has reviewed and agreed to the " "above plan.      CRUZ MICAELA BAKER JO  December 1, 2022                                                   Randi Cleary          SAFETY PLAN:  Step 1: Warning signs / cues (Thoughts, images, mood, situation, behavior) that a crisis may be developing:  ? Thoughts: \"I don't want to continue\" \"I am unwanted\"  ? Images: none  ? Thinking Processes: ruminating  ? Mood: anger  ? Behaviors: isolating/withdrawing , can't stop crying, not taking care of myself and not taking care of my responsibilities  ? Situations: small triggers, such as not being able to find something, or dropping something   Step 2: Coping strategies - Things I can do to take my mind off of my problems without contacting another person (relaxation technique, physical activity):  ? Distress Tolerance Strategies:  arts and crafts: drawing, play with my pet , listen to positive and upbeat music: any, change body temperature (ice pack/cold water)  and paced breathing/progressive muscle relaxation  ? Physical Activities: go for a walk, deep breathing and stretching   ? Focus on helpful thoughts:  \"You've been through this before, you can get through it again.\"  Step 3: People and social settings that provide distraction:                 Name: Carmen                            Name: Darien                           Name: Aleida       ? pool, shopping, Carmen's house, [employer]       Step 4: Remind myself of people and things that are important to me and worth living for:  Clifford Little Donna, post-COVID world, options of what could be in your future        Step 5: When I am in crisis, I can ask these people to help me use my safety plan:                 Name: Sidney  Step 6: Making the environment safe:   ? go to sleep/daydream  Step 7: Professionals or agencies I can contact during a crisis:  ? [facility] Daytime Number: [phone]  ? Suicide Prevention Lifeline: 2-835-515-YONA (2048)  ? Crisis Text Line Service (available 24 hours a day, 7 days " a week): Text [place] to 919211    Local Crisis Services: [facility] Crisis: [phone]     Call 911 or go to my nearest emergency department.       I helped develop this safety plan and agree to use it when needed.  I have been given a copy of this plan.       Client signature _________________________________________________________________  Today s date:  11/24/2020  Adapted from Safety Plan Template 2008 Randi Poole and Robby Barba is reprinted with the express permission of the authors.  No portion of the Safety Plan Template may be reproduced without the express, written permission.  You can contact the authors at [email or web address] or [email or web address].

## 2023-02-07 NOTE — GROUP NOTE
Psychotherapy Group Note    PATIENT'S NAME: Randi Cleary  MRN:   [id number]  :   1955  ACCT. NUMBER: [id number]  DATE OF SERVICE: 23  START TIME:  9:00 AM  END TIME:  9:50 AM  FACILITATOR: Patsy Mac LGSW; Gloria Rico OTR/L  TOPIC: MH EBP Group: Coping Skills  [facility]  TRACK: A-IOP    NUMBER OF PARTICIPANTS: 4    Summary of Group / Topics Discussed:  Coping Skills: Grounding: Patients discussed and practiced strategies to increase attachment / presence to the current moment.  Patients identified situations in which using these strategies will help improve emotion regulation sense of calm in the body.  Reviewed the benefits of applying grounding strategies, as well as past / current practices of each member.  Patients identified situations in which using these strategies would reduce stress. They developed the ability to distinguish when these strategies can be useful in their lives for management and stress and psychological well-being.    Patient Session Goals / Objectives:    Understand the purpose of using grounding strategies to reduce stress.    Verbalize understanding of how and when to apply grounding strategies to reduce distress and increase presence in the moment.    Review patients current grounding practices and discuss a more formal way of practicing and accessing skills.    Practice using various calming strategies (e.g. 5-4-3-2-1; mental and body awareness).    Choose 1-2 grounding strategies to apply during times of distress.                                    Service Modality:  Video Visit     Telemedicine Visit: The patient's condition can be safely assessed and treated via synchronous audio and visual telemedicine encounter.      Reason for Telemedicine Visit: Services only offered telehealth    Originating Site (Patient Location): Patient's home    Distant Site (Provider Location): Provider Remote Setting- Home Office    Consent:  The  patient/guardian has verbally consented to: the potential risks and benefits of telemedicine (video visit) versus in person care; bill my insurance or make self-payment for services provided; and responsibility for payment of non-covered services.     Patient would like the video invitation sent by:  My Chart    Mode of Communication:  Video Conference via Medical Zoom    As the provider I attest to compliance with applicable laws and regulations related to telemedicine.                               Patient Participation / Response:  Fully participated with the group by sharing personal reflections / insights and openly received / provided feedback with other participants.    Demonstrated understanding of topics discussed through group discussion and participation, Expressed understanding of the relevance / importance of coping skills at distressing times in life and Demonstrated knowledge of when to consider using a variety of coping skills in daily life    Treatment Plan:  Patient has an initial individualized treatment plan that was created as part of their diagnostic assessment / admission process.  A master individualized treatment plan is in the process of being developed with the patient and multi-disciplinary care team.    Patsy Mac LGSW

## 2023-02-08 ENCOUNTER — HOSPITAL ENCOUNTER (OUTPATIENT)
Dept: BEHAVIORAL HEALTH | Facility: CLINIC | Age: 68
Discharge: HOME | End: 2023-02-08
Attending: PSYCHIATRY & NEUROLOGY
Payer: MEDICARE

## 2023-02-08 PROCEDURE — 90853 GROUP PSYCHOTHERAPY: CPT | Mod: GT

## 2023-02-08 PROCEDURE — 90853 GROUP PSYCHOTHERAPY: CPT | Mod: PO

## 2023-02-08 NOTE — GROUP NOTE
Psychoeducation Group Note    PATIENT'S NAME: Randi Cleary  MRN:   [id number]  :   1955  ACCT. NUMBER: [id number]  DATE OF SERVICE: 23  START TIME:  9:00 AM  END TIME:  9:50 AM  FACILITATOR: Sheeba Velez LMFT; Gloria Rico OTR/L  TOPIC:  Wellness Group: Health Maintenance  [facility] Day [facility]  TRACK: AIOP    NUMBER OF PARTICIPANTS: 6                                      Service Modality:  Video Visit     Telemedicine Visit: The patient's condition can be safely assessed and treated via synchronous audio and visual telemedicine encounter.      Reason for Telemedicine Visit: Patient has requested telehealth visit    Originating Site (Patient Location): Patient's home    Distant Site (Provider Location): Provider Remote Setting- Home Office    Consent:  The patient/guardian has verbally consented to: the potential risks and benefits of telemedicine (video visit) versus in person care; bill my insurance or make self-payment for services provided; and responsibility for payment of non-covered services.     Patient would like the video invitation sent by:  My Chart    Mode of Communication:  Video Conference via Medical Zoom    As the provider I attest to compliance with applicable laws and regulations related to telemedicine.                            Summary of Group / Topics Discussed:  Health Maintenance: Discharge planning/Community resources: Patients worked on completing an instructor-facilitated discharge planning activity. Discharge planning begins for all patients after admission. Competent discharge planning promotes a successful transition and decreases the likelihood of mental health relapse. In this group, all dimensions of wellness were reviewed to assess for needs/discharge readiness. These dimensions included: physical, emotional, occupational/productivity, environmental, social, spiritual, intellectual, and financial. Patients worked on  completing/updating their discharge planning and identifying their treatment needs prior to time of discharge.     Patient Session Goals / Objectives:  ? Identified unmet treatment needs to accomplish before discharge  ? Completed all dimensions of the discharge planning packet  ? Participated in the planning process, make phone calls, set up appointments, got connected with community resources, followed up with treatment team as needed         Patient Participation / Response:  Fully participated with the group by sharing personal reflections / insights and openly received / provided feedback with other participants.    Demonstrated understanding of topics discussed through group discussion and participation, Identified / Expressed personal readiness to practice skills and Verbalized understanding of health maintenance topic    Treatment Plan:  Patient has a current master individualized treatment plan and today was our weekly review of the patient's progress.  See Epic treatment plan for progress / updates on goals and plan.    Sheeba Velez LMFT

## 2023-02-08 NOTE — GROUP NOTE
Process Group Note    PATIENT'S NAME: Randi Cleary  MRN:   [id number]  :   1955  ACCT. NUMBER: [id number]  DATE OF SERVICE: 23  START TIME: 11:00 AM  END TIME: 11:50 AM  FACILITATOR: Sheeba Velez LMFT  TOPIC:  Process Group    Diagnoses:  296.33 (F33.2) Major Depressive Disorder, Recurrent Episode, Severe With anxious distress  4. Other Diagnoses that is relevant to services:   300.02 (F41.1) Generalized Anxiety Disorder    5. Provisional Diagnosis:  296.89 Bipolar II Disorder vs. 314.01 (F90.2) Attention-Deficit/Hyperactivity Disorder   Combined presentation       [facility] Day Treatment  TRACK: AIOP    NUMBER OF PARTICIPANTS: 5                                      Service Modality:  Video Visit     Telemedicine Visit: The patient's condition can be safely assessed and treated via synchronous audio and visual telemedicine encounter.      Reason for Telemedicine Visit: Patient has requested telehealth visit    Originating Site (Patient Location): Patient's home    Distant Site (Provider Location): Provider Remote Setting- Home Office    Consent:  The patient/guardian has verbally consented to: the potential risks and benefits of telemedicine (video visit) versus in person care; bill my insurance or make self-payment for services provided; and responsibility for payment of non-covered services.     Patient would like the video invitation sent by:  My Chart    Mode of Communication:  Video Conference via Medical Zoom    As the provider I attest to compliance with applicable laws and regulations related to telemedicine.                                  Data:    Session content: At the start of this group, patients were invited to check in by identifying themselves, describing their current emotional status, and identifying issues to address in this group.   Area(s) of treatment focus addressed in this session included Symptom Management, Personal Safety, Develop /  Improve Independent Living Skills and Develop Socialization / Interpersonal Relationship Skills.    Patient reported feeling alone and apprehensive today. Patient reported Friday is her anniversary with her  and her apprehension about how that is going to go. Patient stated her goal for today is to do laundry. Patient stated barriers are the stairs and laundry being in the basement. Patient stated skills are to pick things she needs to do and feels proud of her ability to do that. Patient denied chemical use. Patient denied safety concerns. Patient reported feeling proud of herself for attending group today.     Therapeutic Interventions/Treatment Strategies:  Psychotherapist offered support, feedback and validation and reinforced use of skills. Treatment modalities used include Motivational Interviewing, Cognitive Behavioral Therapy and Dialectical Behavioral Therapy. Interventions include Behavioral Activation: Explored how behaviors effect mood and interact with thoughts and feelings and Encouraged strategies to reduce individual procrastination and increase motivation by increasing goal-directed activities to enhance mood and reduce symptoms. and Emotions Management:  Reinforced the purpose and biological basis of emotions, Discussed barriers to emotional regulation and Increased awareness of daily mood patterns/changes.    Assessment:    Patient response:   Patient responded to session by giving feedback, listening and being attentive    Possible barriers to participation / learning include: severity of symptoms    Health Issues:   Yes: Pain, Associated Psychological Distress       Substance Use Review:   Substance Use: No active concerns identified.    Mental Status/Behavioral Observations  Appearance:   Appropriate   Eye Contact:   Good   Psychomotor Behavior: Normal   Attitude:   Cooperative   Orientation:   All  Speech   Rate / Production: Talkative   Volume:  Normal   Mood:    Anxious  Depressed   Irritable   Affect:    Labile   Thought Content:   Rumination  Thought Form:  Coherent  Logical     Insight:    Fair     Plan:     Safety Plan: No current safety concerns identified.  Recommended that patient call 911 or go to the local ED should there be a change in any of these risk factors.     Barriers to treatment: None identified    Patient Contracts (see media tab):  None    Substance Use: Not addressed in session     Continue or Discharge: Patient will continue in Adult Day Treatment (ADT)  as planned. Patient is likely to benefit from learning and using skills as they work toward the goals identified in their treatment plan.      Sheeba Velez, EWELINA  February 8, 2023

## 2023-02-08 NOTE — PROGRESS NOTES
I have reviewed my treatment plan with my therapist on 2/8/2023.   I agree with the plan as it is written in the electronic health record. (Admission IOP AM)    Name:      Signature:  Randi RIVERA Cathy Evin Unable to sign due to Covid-19, verbal permission given 2/8/2023, 9am.       Roland Das MD  Psychiatrist/Medical Director Roland Das MD on 2/10/2023 at 2:15 PM   EWELINA Arita  Psychotherapist EWELINA Marino on 2/8/2023 at 11:47 AM    Gloria Rico MA, OTR/L  Gloria Rico MA, OTR/L on February 8, 2023 at 11:12 AM

## 2023-02-08 NOTE — GROUP NOTE
Psychoeducation Group Note    PATIENT'S NAME: Randi Cleary  MRN:   [id number]  :   1955  ACCT. NUMBER: [id number]  DATE OF SERVICE: 23  START TIME: 10:00 AM  END TIME: 11:00 AM  FACILITATOR: Sheeba Velez LMFT; Gloria Rico OTR/L  TOPIC:  Wellness Group: Health Maintenance  [facility] Day Treatment  TRACK: AIOP    NUMBER OF PARTICIPANTS: 5                                      Service Modality:  Video Visit     Telemedicine Visit: The patient's condition can be safely assessed and treated via synchronous audio and visual telemedicine encounter.      Reason for Telemedicine Visit: Patient has requested telehealth visit    Originating Site (Patient Location): Patient's home    Distant Site (Provider Location): Provider Remote Setting- Home Office    Consent:  The patient/guardian has verbally consented to: the potential risks and benefits of telemedicine (video visit) versus in person care; bill my insurance or make self-payment for services provided; and responsibility for payment of non-covered services.     Patient would like the video invitation sent by:  My Chart    Mode of Communication:  Video Conference via Medical Zoom    As the provider I attest to compliance with applicable laws and regulations related to telemedicine.          Summary of Group / Topics Discussed:  Health Maintenance: Discharge planning/Community resources: Patients worked on completing an instructor-facilitated discharge planning activity. Discharge planning begins for all patients after admission. Competent discharge planning promotes a successful transition and decreases the likelihood of mental health relapse. In this group, all dimensions of wellness were reviewed to assess for needs/discharge readiness. These dimensions included: physical, emotional, occupational/productivity, environmental, social, spiritual, intellectual, and financial. Patients worked on completing/updating their  discharge planning and identifying their treatment needs prior to time of discharge.     Patient Session Goals / Objectives:  ? Identified unmet treatment needs to accomplish before discharge  ? Completed all dimensions of the discharge planning packet  ? Participated in the planning process, make phone calls, set up appointments, got connected with community resources, followed up with treatment team as needed         Patient Participation / Response:  Fully participated with the group by sharing personal reflections / insights and openly received / provided feedback with other participants.    Demonstrated understanding of topics discussed through group discussion and participation, Identified / Expressed personal readiness to practice skills and Verbalized understanding of health maintenance topic    Treatment Plan:  Patient has a current master individualized treatment plan and today was our weekly review of the patient's progress.  See Epic treatment plan for progress / updates on goals and plan.    Sheeba Velez LMFT

## 2023-02-09 ENCOUNTER — HOSPITAL ENCOUNTER (OUTPATIENT)
Dept: BEHAVIORAL HEALTH | Facility: CLINIC | Age: 68
Discharge: HOME | End: 2023-02-09
Attending: PSYCHIATRY & NEUROLOGY
Payer: MEDICARE

## 2023-02-09 PROCEDURE — 90853 GROUP PSYCHOTHERAPY: CPT | Mod: GT | Performed by: SOCIAL WORKER

## 2023-02-09 PROCEDURE — 90853 GROUP PSYCHOTHERAPY: CPT | Mod: GT

## 2023-02-09 NOTE — GROUP NOTE
Psychoeducation Group Note    PATIENT'S NAME: Randi Cleary  MRN:   [id number]  :   1955  ACCT. NUMBER: [id number]  DATE OF SERVICE: 23  START TIME:  9:00 AM  END TIME:  9:50 AM  FACILITATOR: Brittani Molina LICSW  TOPIC: MH Wellness Group: Health Maintenance  Service Modality:  Video Visit     Telemedicine Visit: The patient's condition can be safely assessed and treated via synchronous audio and visual telemedicine encounter.       Reason for Telemedicine Visit: Services only offered telehealth and due to COVID-19.     Originating Site (Patient Location): Patient's home     Distant Site (Provider Location): Provider Remote Setting- Home Office     Consent:  The patient/guardian has verbally consented to: the potential risks and benefits of telemedicine (video visit) versus in person care; bill my insurance or make self-payment for services provided; and responsibility for payment of non-covered services.      Patient would like the video invitation sent by:  My Chart     Mode of Communication:  Video Conference via Medical Zoom     As the provider I attest to compliance with applicable laws and regulations related to telemedicine.    [facility] Adult Mental Health Day Treatment  TRACK: AIOP    NUMBER OF PARTICIPANTS: 4    Summary of Group / Topics Discussed:  Health Maintenance: Weekend planning: Patients were given time to complete a weekend plan of what they will do to promote wellness and sobriety over the weekend when they do not have the structure of group. Patients were encouraged to review progress on their treatment goals and were challenged to identify ways to work toward meeting them. Patients identified and discussed foreseeable barriers to success over the weekend and then developed a plan to overcome them. Patients reviewed their distress coping skills and social support network and discussed this with the group.       Patient Session Goals / Objectives:    ?     Identified activities to engage in that promote balance in wellness  ?    Distinguished possible barriers to success over the weekend and created a plan to overcome them  ?    Listed distress coping skills and identified social support network to utilize if in crisis during the weekend        Patient Participation / Response:  Fully participated with the group by sharing personal reflections / insights and openly received / provided feedback with other participants.    Demonstrated understanding of topics discussed through group discussion and participation and Verbalized understanding of health maintenance topic    Treatment Plan:  Patient has a current master individualized treatment plan.  See Epic treatment plan for more information.    Brittani Molina, LICSW

## 2023-02-09 NOTE — GROUP NOTE
Process Group Note    PATIENT'S NAME: Randi Cleary  MRN:   [id number]  :   1955  ACCT. NUMBER: [id number]  DATE OF SERVICE: 23  START TIME: 11:00 AM  END TIME: 11:50 AM  FACILITATOR: Sheeba Velez LMFT  TOPIC:  Process Group    Diagnoses:  296.33 (F33.2) Major Depressive Disorder, Recurrent Episode, Severe With anxious distress  4. Other Diagnoses that is relevant to services:   300.02 (F41.1) Generalized Anxiety Disorder    5. Provisional Diagnosis:  296.89 Bipolar II Disorder vs. 314.01 (F90.2) Attention-Deficit/Hyperactivity Disorder   Combined presentation       [facility] Day [facility]  TRACK: AIOP    NUMBER OF PARTICIPANTS: 5                                      Service Modality:  Video Visit     Telemedicine Visit: The patient's condition can be safely assessed and treated via synchronous audio and visual telemedicine encounter.      Reason for Telemedicine Visit: Patient has requested telehealth visit    Originating Site (Patient Location): Patient's home    Distant Site (Provider Location): Provider Remote Setting- Home Office    Consent:  The patient/guardian has verbally consented to: the potential risks and benefits of telemedicine (video visit) versus in person care; bill my insurance or make self-payment for services provided; and responsibility for payment of non-covered services.     Patient would like the video invitation sent by:  My Chart    Mode of Communication:  Video Conference via Medical Zoom    As the provider I attest to compliance with applicable laws and regulations related to telemedicine.                                  Data:    Session content: At the start of this group, patients were invited to check in by identifying themselves, describing their current emotional status, and identifying issues to address in this group.   Area(s) of treatment focus addressed in this session included Symptom Management, Personal Safety, Develop /  Improve Independent Living Skills and Develop Socialization / Interpersonal Relationship Skills.    Patient reported feeling stressed today. Patient reported feeling vulnerable, apprehensive, and hopeful today. Patient presented tearfully. Patient reported having a doctor appointment and is worried about feeling foolish. Therapist challenged to reframe negative core belief. Patient stated in 2017 she went through psychological issues presenting with anger and rage. 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 Skills are to get her things ready for today. Patient reported feeling hopeful her  may surprise her. Patient denied safety denied safety concerns or chemical use. Patient reported feeling grateful for being in the group.     Therapeutic Interventions/Treatment Strategies:  Psychotherapist offered support, feedback and validation and reinforced use of skills. Treatment modalities used include Motivational Interviewing, Cognitive Behavioral Therapy and Dialectical Behavioral Therapy. Interventions include Behavioral Activation: Explored how behaviors effect mood and interact with thoughts and feelings and Emotions Management:  Reinforced the purpose and biological basis of emotions, Discussed barriers to emotional regulation and Increased awareness of daily mood patterns/changes.    Assessment:    Patient response:   Patient responded to session by accepting feedback, listening, focusing on goals, being attentive and accepting support    Possible barriers to participation / learning include: severity of symptoms    Health Issues:   Yes: Pain, Associated Psychological Distress       Substance Use Review:   Substance Use: No active concerns identified.    Mental Status/Behavioral Observations  Appearance:   Appropriate   Eye Contact:   Good    Psychomotor Behavior: Normal   Attitude:   Cooperative   Orientation:   All  Speech   Rate / Production: Emotional Talkative   Volume:  Normal   Mood:    Anxious  Depressed   Affect:    Appropriate  Tearful  Thought Content:   Rumination  Thought Form:  Coherent  Logical     Insight:    Poor     Plan:     Safety Plan: No current safety concerns identified.  Recommended that patient call 911 or go to the local ED should there be a change in any of these risk factors.     Barriers to treatment: None identified    Patient Contracts (see media tab):  None    Substance Use: Not addressed in session     Continue or Discharge: Patient will continue in Adult Day Treatment (ADT)  as planned. Patient is likely to benefit from learning and using skills as they work toward the goals identified in their treatment plan.      Sheeba Velez, EWELINA  February 9, 2023

## 2023-02-09 NOTE — GROUP NOTE
Psychotherapy Group Note    PATIENT'S NAME: Randi Cleary  MRN:   [id number]  :   1955  ACCT. NUMBER: [id number]  DATE OF SERVICE: 23  START TIME: 10:00 AM  END TIME: 10:50 AM  FACILITATOR: Pamela Cary LICSW  TOPIC:  EBP Group: Enhanced Mindfulness  [facility] Day Treatment  TRACK: aiop    NUMBER OF PARTICIPANTS: 5    Summary of Group / Topics Discussed:  Enhanced Mindfulness: Body and Mind Integration: Patients received an overview and education regarding the importance of including the body in the management of emotional health and self-care and as a direct route to mindfulness practice.  Patients discussed various ways in which the body can serve as an informant to their physical and emotional experiences/need. Patients discussed the body as a direct link to the present moment and to mindfulness practice.  Patients discussed current relationship with body, self-awareness, mindfulness practice and barriers to connection with body.  Patients were guided through breath work and movement to facilitate greater self-awareness, grounding, self-expression, and connection to other.  Patients discussed how the experiential could be applied to better manage mental health and develop hong connection to self.    Patient Session Goals / Objectives:    Identify how movement awareness could be used for grounding, stress management, self-expression, connection to other and self-regulation    Improved awareness of breath and movement preferences    Identify how movement and the body is used in mindfulness practice    Reflect on use of these practices in everyday life.    Identify barriers to attending to body                                        Service Modality:  Video Visit         Telemedicine Visit: The patient's condition can be safely assessed and treated via synchronous audio and visual telemedicine encounter.          Reason for Telemedicine Visit: Services only offered  telehealth and covid19        Originating Site (Patient Location): Patient's home        Distant Site (Provider Location): Provider Remote Setting- Home Office        Consent:  The patient/guardian has verbally consented to: the potential risks and benefits of telemedicine (video visit) versus in person care; bill my insurance or make self-payment for services provided; and responsibility for payment of non-covered services.         Patient would like the video invitation sent by:  My Chart        Mode of Communication:  Video Conference via Medical Zoom        As the provider I attest to compliance with applicable laws and regulations related to telemedicine.                                                  Patient Participation / Response:  Fully participated with the group by sharing personal reflections / insights and openly received / provided feedback with other participants.    Demonstrated understanding of topics discussed through group discussion and participation and Practiced skills in session    Treatment Plan:  Patient has an initial individualized treatment plan that was created as part of their diagnostic assessment / admission process.  A master individualized treatment plan is in the process of being developed with the patient and multi-disciplinary care team.    SUSAN GrubbsSW

## 2023-02-10 ENCOUNTER — TRANSFERRED RECORDS (OUTPATIENT)
Dept: HEALTH INFORMATION MANAGEMENT | Facility: CLINIC | Age: 68
End: 2023-02-10

## 2023-02-10 ENCOUNTER — VIRTUAL VISIT (OUTPATIENT)
Dept: PSYCHOLOGY | Facility: CLINIC | Age: 68
End: 2023-02-10
Payer: MEDICARE

## 2023-02-10 DIAGNOSIS — F43.9 TRAUMA AND STRESSOR-RELATED DISORDER: ICD-10-CM

## 2023-02-10 DIAGNOSIS — F41.1 GENERALIZED ANXIETY DISORDER: ICD-10-CM

## 2023-02-10 DIAGNOSIS — F31.81 BIPOLAR 2 DISORDER (H): Primary | ICD-10-CM

## 2023-02-10 PROCEDURE — 90837 PSYTX W PT 60 MINUTES: CPT | Mod: VID | Performed by: SOCIAL WORKER

## 2023-02-10 NOTE — PROGRESS NOTES
[facility]                                     Progress Note    Patient Name: Randi Cleary  Date: 2/10/23         Service Type: Individual     Session Start Time: 9:01 AM  Session End Time: 9:54 AM     Session Length: 53 minutes    Session #: 160    Attendees: Client and new hire Carloz Varmaug sat in on session with patient permission    Service Modality:  Video Visit:      Provider verified identity through the following two step process.  Patient provided:  Patient is known previously to provider    Telemedicine Visit: The patient's condition can be safely assessed and treated via synchronous audio and visual telemedicine encounter.      Reason for Telemedicine Visit: Patient convenience (e.g. access to timely appointments / distance to available provider)    Originating Site (Patient Location): Patient's home    Distant Site (Provider Location): Provider Remote Setting- Home Office    Consent:  The patient/guardian has verbally consented to: the potential risks and benefits of telemedicine (video visit) versus in person care; bill my insurance or make self-payment for services provided; and responsibility for payment of non-covered services.     Patient would like the video invitation sent by:  My Chart    Mode of Communication:  Video Conference via [facility]    Distant Location (Provider):  Off-site    As the provider I attest to compliance with applicable laws and regulations related to telemedicine.    DATA  Extended Session (53+ minutes): PROLONGED SERVICE IN THE OUTPATIENT SETTING REQUIRING DIRECT (FACE-TO-FACE) PATIENT CONTACT BEYOND THE USUAL SERVICE:    - Patient's presenting concerns require more intensive intervention than could be completed within the usual service  Interactive Complexity: No  Crisis: No        Progress Since Last Session (Related to Symptoms / Goals / Homework):   Symptoms: Patient continues to feel overwhelmed    Homework: Progress made  Call about couples  counseling -not done  Talk to your primary care doctor about what's on your list (mood, neck, arthritis) -not yet had this appointment     Episode of Care Goals: Satisfactory progress - ACTION (Actively working towards change); Intervened by reinforcing change plan / affirming steps taken     Current / Ongoing Stressors and Concerns:   Patient is currently socially isolated. She has a conflictual relationship with her .  She is getting minimal physical activity.  She had recent eye surgery and shoulder surgery.     Treatment Objective(s) Addressed in This Session:   Patient will increase frequency of engaging her in ADLs.  Patient will track and record at least 5 pleasant exchanges with . Patient will be able to identify at least 5 positive traits about her .  Patient will reduce level of depressive and anxious features as evidenced by reduction in score on her CHAVO-7 and PHQ-9 (scores of 15 and 16 at first measurment, respectively).     Intervention:   Supportive Therapy: Talked through patient's feelings of overwhelm related to all of her appointments. Processed day treatment starting. Also processed her appointment with endodontologist, including her fear of learning that her mood concerns might not have a physical origin as she's suspected.  Provided support regarding her relationship and her concerns around this. Discussed the impact of trauma on her functioning and family relationships. Talked about emotions related to earlier times in her life and helped give permission for all emotions. Reviewed homework and identified successes and barriers to completion. Set new goals for the week.      The following assessments were completed by patient for this visit:  PHQ9: of note, patient reported she was in a hurry and didn't read all questions, she reports no suicidal ideation  PHQ-9 SCORE 12/30/2022 1/5/2023 1/10/2023 1/11/2023 1/11/2023 1/30/2023 2/5/2023   PHQ-9 Total Score MyChart 18  (Moderately severe depression) 16 (Moderately severe depression) 16 (Moderately severe depression) - 15 (Moderately severe depression) 13 (Moderate depression) 18 (Moderately severe depression)   PHQ-9 Total Score 18 16 16 15 15 13 18   Some encounter information is confidential and restricted. Go to Review Flowsheets activity to see all data.     GAD7:   CHAVO-7 SCORE 12/15/2022 12/15/2022 1/5/2023 1/5/2023 1/5/2023 1/30/2023 2/5/2023   Total Score - 19 (severe anxiety) - - 17 (severe anxiety) 17 (severe anxiety) 16 (severe anxiety)   Total Score 19 19 17 17 17 17 16   Some encounter information is confidential and restricted. Go to Review Flowsheets activity to see all data.     PROMIS 10-Global Health (only subscores and total score):   PROMIS-10 Scores Only 1/5/2023 1/5/2023 2/5/2023 2/5/2023 2/5/2023 2/5/2023 2/5/2023   Global Mental Health Score 5 5 5 5 5 5 5   Global Physical Health Score 8 8 8 8 8 8 8   PROMIS TOTAL - SUBSCORES 13 13 13 13 13 13 13   Some encounter information is confidential and restricted. Go to Review Flowsheets activity to see all data.        ASSESSMENT: Current Emotional / Mental Status (status of significant symptoms):   Risk status (Self / Other harm or suicidal ideation)   Patient denies current fears or concerns for personal safety.   Patient denies current or recent suicidal ideation or behaviors.   Patient denies current or recent homicidal ideation or behaviors.   Patient denies current or recent self injurious behavior or ideation.   Patient denies other safety concerns.   Patient reports there has been no change in risk factors since their last session.     Patient reports there has been no change in protective factors since their last session.     A safety and risk management plan has been developed including: Patient consented to co-developed safety plan on 11/24/2020.  Safety and risk management plan was reviewed.   Patient agreed to use safety plan should any safety concerns  arise.  A copy was made available to the patient.     Appearance:   Appropriate    Eye Contact:   Good    Psychomotor Behavior: Normal    Attitude:   Cooperative    Orientation:   All   Speech    Rate / Production: Normal/ Responsive    Volume:  Normal    Mood:    Normal   Affect:    Appropriate    Thought Content:  Clear    Thought Form:  Coherent    Insight:    Good      Medication Review:   No changes to current psychiatric medication(s)     Medication Compliance:   Yes     Changes in Health Issues:   None reported     Chemical Use Review:   Substance Use: Chemical use reviewed, no active concerns identified Nothing used since 2021.     Tobacco Use: No current tobacco use.      Diagnosis:  1. Bipolar 2 disorder (H)    2. Generalized anxiety disorder    3. Trauma and stressor-related disorder      Collateral Reports Completed:   Not Applicable    PLAN: (Patient Tasks / Therapist Tasks / Other)  Focus on day treatment  Get back to the pool  Look at a budget  Finish up taxes  Call about couples counseling  Talk to your primary care doctor about what's on your list (mood, neck, arthritis)        There has been demonstrated improvement in functioning while patient has been engaged in psychotherapy/psychological service- if withdrawn the patient would deteriorate and/or relapse.     MICAELA SLADE   February 10, 2023                                                        ______________________________________________________________________    Individual Treatment Plan    Patient's Name: Randi Cleary  YOB: 1955    Date of Creation: 20  Date Treatment Plan Last Reviewed/Revised: 22    DSM5 Diagnoses: 296.89 Bipolar II Disorder Depressed, 300.02 (F41.1) Generalized Anxiety Disorder or Adjustment Disorders  309.89 (F43.8) Other Specified Trauma and Stressor Related Disorder  Psychosocial / Contextual Factors: Patient's entire family of origin has , she now has a sister-in-law  "and  as support.  Relationship with  is conflictual. She is recovering from surgeries  PROMIS (reviewed every 90 days): PROMIS-10 Scores  PROMIS 10-Global Health (only subscores and total score):   PROMIS-10 Scores Only 12/14/2021 3/15/2022 3/15/2022 3/15/2022 3/24/2022 4/1/2022 6/9/2022   Global Mental Health Score 6 6 6 6 8 12 12   Global Physical Health Score 9 9 9 9 8 11 10   PROMIS TOTAL - SUBSCORES 15 15 15 15 16 23 22       Referral / Collaboration:  Referral to another professional/service is not indicated at this time..    Anticipated number of session for this episode of care: 50  Anticipation frequency of session: Biweekly  Anticipated Duration of each session: 53 or more minutes due to intensity of trauma symptoms  Treatment plan will be reviewed in 90 days or when goals have been changed.   There has been demonstrated improvement in functioning while patient has been engaged in psychotherapy/psychological service- if withdrawn the patient would deteriorate and/or relapse.       MeasurableTreatment Goal(s) related to diagnosis / functional impairment(s)  Goal 1: Patient will \"jumpstart, getting going with the things I need to be doing around the house as far as picking up, doing things, trying to do something every day.  Also to lessen the animosity between me and my .\"    I will know I've met my goal when my shoulders are fixed and I can see.      Objective #A (Patient Action)    Patient will increase frequency of engaging her in ADLs.  Status: Continued - Date(s): 12/10/21, 3/9/22, 6/9/22, 9/2/22, 12/1/22    Intervention(s)  Therapist will engage patient in CBT, specifically behavioral activation.    Objective #B  Patient will  track and record at least 5 pleasant exchanges with . Patient will be able to identify at least 5 positive traits about her  and how he relates to her.  Status: Continued - Date(s): 12/10/21, 3/9/22, 6/9/22, 9/2/22, " "12/1/22    Intervention(s)  Therapist will teach assertiveness skills and assign homework related to relationship interactions.    Objective #C  Patient will reduce level of depressive and anxious features as evidenced by reduction in score on her CHAVO-7 and PHQ-9 (scores of 15 and 16 at first measurment, respectively).  Status: Continued - Date(s): 12/10/21, 3/9/22, 6/9/22, 9/2/22, 12/1/22    Intervention(s)  Therapist will engage patient in person-centered therapy and CBT.    Patient has reviewed and agreed to the above plan.      MICAELA SLADE  December 1, 2022                                                   Randi Cleary          SAFETY PLAN:  Step 1: Warning signs / cues (Thoughts, images, mood, situation, behavior) that a crisis may be developing:  ? Thoughts: \"I don't want to continue\" \"I am unwanted\"  ? Images: none  ? Thinking Processes: ruminating  ? Mood: anger  ? Behaviors: isolating/withdrawing , can't stop crying, not taking care of myself and not taking care of my responsibilities  ? Situations: small triggers, such as not being able to find something, or dropping something   Step 2: Coping strategies - Things I can do to take my mind off of my problems without contacting another person (relaxation technique, physical activity):  ? Distress Tolerance Strategies:  arts and crafts: drawing, play with my pet , listen to positive and upbeat music: any, change body temperature (ice pack/cold water)  and paced breathing/progressive muscle relaxation  ? Physical Activities: go for a walk, deep breathing and stretching   ? Focus on helpful thoughts:  \"You've been through this before, you can get through it again.\"  Step 3: People and social settings that provide distraction:                 Name: Carmen                            Name: Darien                           Name: Aleida       ? pool, shopping, Carmen's house, [employer]       Step 4: Remind myself of people and things that are important to me and " worth living for:  Sidney [place]amo[place] blue, post-COVID world, options of what could be in your future        Step 5: When I am in crisis, I can ask these people to help me use my safety plan:                 Name: Sidney  Step 6: Making the environment safe:   ? go to sleep/daydream  Step 7: Professionals or agencies I can contact during a crisis:  ? [facility] Daytime Number: [phone]  ? Suicide Prevention Lifeline: 0-268-645-IWZV (8774)  ? Crisis Text Line Service (available 24 hours a day, 7 days a week): Text [place] to 431495    Local Crisis Services: [facility] Crisis: [phone]     Call 911 or go to my nearest emergency department.       I helped develop this safety plan and agree to use it when needed.  I have been given a copy of this plan.       Client signature _________________________________________________________________  Today s date:  11/24/2020  Adapted from Safety Plan Template 2008 Randi Poole and Robby Barba is reprinted with the express permission of the authors.  No portion of the Safety Plan Template may be reproduced without the express, written permission.  You can contact the authors at [email or web address] or [email or web address].

## 2023-02-14 ENCOUNTER — TELEPHONE (OUTPATIENT)
Dept: BEHAVIORAL HEALTH | Facility: CLINIC | Age: 68
End: 2023-02-14
Payer: MEDICARE

## 2023-02-14 ENCOUNTER — VIRTUAL VISIT (OUTPATIENT)
Dept: PSYCHOLOGY | Facility: CLINIC | Age: 68
End: 2023-02-14
Payer: MEDICARE

## 2023-02-14 DIAGNOSIS — F41.1 GENERALIZED ANXIETY DISORDER: ICD-10-CM

## 2023-02-14 DIAGNOSIS — F43.9 TRAUMA AND STRESSOR-RELATED DISORDER: ICD-10-CM

## 2023-02-14 DIAGNOSIS — F31.81 BIPOLAR 2 DISORDER (H): Primary | ICD-10-CM

## 2023-02-14 PROCEDURE — 90837 PSYTX W PT 60 MINUTES: CPT | Mod: VID | Performed by: SOCIAL WORKER

## 2023-02-14 ASSESSMENT — PATIENT HEALTH QUESTIONNAIRE - PHQ9
10. IF YOU CHECKED OFF ANY PROBLEMS, HOW DIFFICULT HAVE THESE PROBLEMS MADE IT FOR YOU TO DO YOUR WORK, TAKE CARE OF THINGS AT HOME, OR GET ALONG WITH OTHER PEOPLE: EXTREMELY DIFFICULT
SUM OF ALL RESPONSES TO PHQ QUESTIONS 1-9: 18
SUM OF ALL RESPONSES TO PHQ QUESTIONS 1-9: 18

## 2023-02-14 NOTE — PROGRESS NOTES
[facility]                                     Progress Note    Patient Name: Randi Cleary  Date: 2/14/23         Service Type: Individual     Session Start Time: 3:02 PM  Session End Time: 3:55 PM     Session Length: 53 minutes    Session #: 161    Attendees: Client attended alone    Service Modality:  Video Visit:      Provider verified identity through the following two step process.  Patient provided:  Patient is known previously to provider    Telemedicine Visit: The patient's condition can be safely assessed and treated via synchronous audio and visual telemedicine encounter.      Reason for Telemedicine Visit: Patient convenience (e.g. access to timely appointments / distance to available provider)    Originating Site (Patient Location): Patient's home    Distant Site (Provider Location): Provider Remote Setting- Home Office    Consent:  The patient/guardian has verbally consented to: the potential risks and benefits of telemedicine (video visit) versus in person care; bill my insurance or make self-payment for services provided; and responsibility for payment of non-covered services.     Patient would like the video invitation sent by:  My Chart    Mode of Communication:  Video Conference via [facility]    Distant Location (Provider):  Off-site    As the provider I attest to compliance with applicable laws and regulations related to telemedicine.    DATA  Extended Session (53+ minutes): PROLONGED SERVICE IN THE OUTPATIENT SETTING REQUIRING DIRECT (FACE-TO-FACE) PATIENT CONTACT BEYOND THE USUAL SERVICE:    - Patient's presenting concerns require more intensive intervention than could be completed within the usual service  Interactive Complexity: No  Crisis: No        Progress Since Last Session (Related to Symptoms / Goals / Homework):   Symptoms: Patient is overwhelmed and stressed    Homework: Progress made  Focus on day treatment -not attended  Get back to the pool -not done  Look  at a budget -started  Finish up taxes -started  Call about couples counseling -not done  Talk to your primary care doctor about what's on your list (mood, neck, arthritis) -not yet done     Episode of Care Goals: Satisfactory progress - ACTION (Actively working towards change); Intervened by reinforcing change plan / affirming steps taken     Current / Ongoing Stressors and Concerns:   Patient is currently socially isolated. She has a conflictual relationship with her .  She is getting minimal physical activity.  She had recent eye surgery and shoulder surgery.     Treatment Objective(s) Addressed in This Session:   Patient will increase frequency of engaging her in ADLs.  Patient will track and record at least 5 pleasant exchanges with . Patient will be able to identify at least 5 positive traits about her .  Patient will reduce level of depressive and anxious features as evidenced by reduction in score on her CHAVO-7 and PHQ-9 (scores of 15 and 16 at first measurment, respectively).     Intervention:   Supportive Therapy: Processed an incident with patient's  where she felt vulnerable and hurt. She also spent time working on her budgeting and finances, but felt overwhelmed by this as well. Reviewed homework and identified successes and barriers to completion. Discussed group and what would happen if she did not attend again, processing fears around this.       The following assessments were completed by patient for this visit:  PHQ9: of note, patient reported she was in a hurry and didn't read all questions, she reports no suicidal ideation  PHQ-9 SCORE 1/5/2023 1/10/2023 1/11/2023 1/11/2023 1/30/2023 2/5/2023 2/14/2023   PHQ-9 Total Score MyChart 16 (Moderately severe depression) 16 (Moderately severe depression) - 15 (Moderately severe depression) 13 (Moderate depression) 18 (Moderately severe depression) 18 (Moderately severe depression)   PHQ-9 Total Score 16 16 15 15 13 18 18   Some  encounter information is confidential and restricted. Go to Review Flowsheets activity to see all data.     GAD7:   CHAVO-7 SCORE 12/15/2022 12/15/2022 1/5/2023 1/5/2023 1/5/2023 1/30/2023 2/5/2023   Total Score - 19 (severe anxiety) - - 17 (severe anxiety) 17 (severe anxiety) 16 (severe anxiety)   Total Score 19 19 17 17 17 17 16   Some encounter information is confidential and restricted. Go to Review Flowsheets activity to see all data.     PROMIS 10-Global Health (only subscores and total score):   PROMIS-10 Scores Only 1/5/2023 1/5/2023 2/5/2023 2/5/2023 2/5/2023 2/5/2023 2/5/2023   Global Mental Health Score 5 5 5 5 5 5 5   Global Physical Health Score 8 8 8 8 8 8 8   PROMIS TOTAL - SUBSCORES 13 13 13 13 13 13 13   Some encounter information is confidential and restricted. Go to Review Flowsheets activity to see all data.        ASSESSMENT: Current Emotional / Mental Status (status of significant symptoms):   Risk status (Self / Other harm or suicidal ideation)   Patient denies current fears or concerns for personal safety.   Patient denies current or recent suicidal ideation or behaviors.   Patient denies current or recent homicidal ideation or behaviors.   Patient denies current or recent self injurious behavior or ideation.   Patient denies other safety concerns.   Patient reports there has been no change in risk factors since their last session.     Patient reports there has been no change in protective factors since their last session.     A safety and risk management plan has been developed including: Patient consented to co-developed safety plan on 11/24/2020.  Safety and risk management plan was reviewed.   Patient agreed to use safety plan should any safety concerns arise.  A copy was made available to the patient.     Appearance:   Appropriate    Eye Contact:   Good    Psychomotor Behavior: Normal    Attitude:   Cooperative    Orientation:   All   Speech    Rate / Production: Normal/  Responsive    Volume:  Normal    Mood:    Normal   Affect:    Appropriate    Thought Content:  Clear    Thought Form:  Coherent    Insight:    Good      Medication Review:   No changes to current psychiatric medication(s)     Medication Compliance:   Yes     Changes in Health Issues:   None reported     Chemical Use Review:   Substance Use: Chemical use reviewed, no active concerns identified Nothing used since 2021.     Tobacco Use: No current tobacco use.      Diagnosis:  1. Bipolar 2 disorder (H)    2. Generalized anxiety disorder    3. Trauma and stressor-related disorder      Collateral Reports Completed:   Not Applicable    PLAN: (Patient Tasks / Therapist Tasks / Other)  Focus on day treatment  Look at a budget  Get back to the pool  Finish up taxes  Call about couples counseling  Talk to your primary care doctor about what's on your list (mood, neck, arthritis)        There has been demonstrated improvement in functioning while patient has been engaged in psychotherapy/psychological service- if withdrawn the patient would deteriorate and/or relapse.     MICAELA SLADE   2023                                                        ______________________________________________________________________    Individual Treatment Plan    Patient's Name: Randi Cleary  YOB: 1955    Date of Creation: 20  Date Treatment Plan Last Reviewed/Revised: 22    DSM5 Diagnoses: 296.89 Bipolar II Disorder Depressed, 300.02 (F41.1) Generalized Anxiety Disorder or Adjustment Disorders  309.89 (F43.8) Other Specified Trauma and Stressor Related Disorder  Psychosocial / Contextual Factors: Patient's entire family of origin has , she now has a sister-in-law and  as support.  Relationship with  is conflictual. She is recovering from surgeries  PROMIS (reviewed every 90 days): PROMIS-10 Scores  PROMIS 10-Global Health (only subscores and total score):   PROMIS-10  "Scores Only 12/14/2021 3/15/2022 3/15/2022 3/15/2022 3/24/2022 4/1/2022 6/9/2022   Global Mental Health Score 6 6 6 6 8 12 12   Global Physical Health Score 9 9 9 9 8 11 10   PROMIS TOTAL - SUBSCORES 15 15 15 15 16 23 22       Referral / Collaboration:  Referral to another professional/service is not indicated at this time..    Anticipated number of session for this episode of care: 50  Anticipation frequency of session: Biweekly  Anticipated Duration of each session: 53 or more minutes due to intensity of trauma symptoms  Treatment plan will be reviewed in 90 days or when goals have been changed.   There has been demonstrated improvement in functioning while patient has been engaged in psychotherapy/psychological service- if withdrawn the patient would deteriorate and/or relapse.       MeasurableTreatment Goal(s) related to diagnosis / functional impairment(s)  Goal 1: Patient will \"jumpstart, getting going with the things I need to be doing around the house as far as picking up, doing things, trying to do something every day.  Also to lessen the animosity between me and my .\"    I will know I've met my goal when my shoulders are fixed and I can see.      Objective #A (Patient Action)    Patient will increase frequency of engaging her in ADLs.  Status: Continued - Date(s): 12/10/21, 3/9/22, 6/9/22, 9/2/22, 12/1/22    Intervention(s)  Therapist will engage patient in CBT, specifically behavioral activation.    Objective #B  Patient will  track and record at least 5 pleasant exchanges with . Patient will be able to identify at least 5 positive traits about her  and how he relates to her.  Status: Continued - Date(s): 12/10/21, 3/9/22, 6/9/22, 9/2/22, 12/1/22    Intervention(s)  Therapist will teach assertiveness skills and assign homework related to relationship interactions.    Objective #C  Patient will reduce level of depressive and anxious features as evidenced by reduction in score on her " "CHAVO-7 and PHQ-9 (scores of 15 and 16 at first measurment, respectively).  Status: Continued - Date(s): 12/10/21, 3/9/22, 6/9/22, 9/2/22, 12/1/22    Intervention(s)  Therapist will engage patient in person-centered therapy and CBT.    Patient has reviewed and agreed to the above plan.      MICAELA SLADE  December 1, 2022                                                   Randi Cleary          SAFETY PLAN:  Step 1: Warning signs / cues (Thoughts, images, mood, situation, behavior) that a crisis may be developing:  ? Thoughts: \"I don't want to continue\" \"I am unwanted\"  ? Images: none  ? Thinking Processes: ruminating  ? Mood: anger  ? Behaviors: isolating/withdrawing , can't stop crying, not taking care of myself and not taking care of my responsibilities  ? Situations: small triggers, such as not being able to find something, or dropping something   Step 2: Coping strategies - Things I can do to take my mind off of my problems without contacting another person (relaxation technique, physical activity):  ? Distress Tolerance Strategies:  arts and crafts: drawing, play with my pet , listen to positive and upbeat music: any, change body temperature (ice pack/cold water)  and paced breathing/progressive muscle relaxation  ? Physical Activities: go for a walk, deep breathing and stretching   ? Focus on helpful thoughts:  \"You've been through this before, you can get through it again.\"  Step 3: People and social settings that provide distraction:                 Name: Carmen                            Name: Darien                           Name: Aleida       ? pool, shopping, Carmen's house, [employer]       Step 4: Remind myself of people and things that are important to me and worth living for:  Clifford Little Donna, post-COVID world, options of what could be in your future        Step 5: When I am in crisis, I can ask these people to help me use my safety plan:                 Name: Sidney  Step 6: Making the environment " safe:   ? go to sleep/daydream  Step 7: Professionals or agencies I can contact during a crisis:  ? [facility] Daytime Number: [phone]  ? Suicide Prevention Lifeline: 0-680-435-XUTK (2590)  ? Crisis Text Line Service (available 24 hours a day, 7 days a week): Text [place] to 240222    Local Crisis Services: [facility] Crisis: [phone]     Call 911 or go to my nearest emergency department.       I helped develop this safety plan and agree to use it when needed.  I have been given a copy of this plan.       Client signature _________________________________________________________________  Today s date:  11/24/2020  Adapted from Safety Plan Template 2008 Randi Poole and Robby Barba is reprinted with the express permission of the authors.  No portion of the Safety Plan Template may be reproduced without the express, written permission.  You can contact the authors at [email or web address] or [email or web address].

## 2023-02-14 NOTE — TELEPHONE ENCOUNTER
"Writer received a message from [facility] Chilo Conley:  Hi team,Winnie just called, having a bad day. Anyone available to call her back? Today is her first day in group (home track), I asked if she'll like a link to join groups and she said she's not sure what she wants to do. Winnie will be home all day if anyone is avail to return her call. Thanks    Writer called client who reported hesitancy to start the 55+ IOP group today.  She was audibly tearful on the call expressing concern over an argument with her  over the weekend. She reported Friday being her 47th anniversary with her .  She reported that this time her  left the home after the argument making her unsure as to where things stand. She feels safe and does not suspect her  is unsafe.  She reported a headache \"from all the stress\".  Writer encouraged her to attend today to receive support but she asked to start tomorrow instead.  She has a self-care plan for today. Writer gave direct # to call if anything comes up before tomorrow. Her [facility] therapist Mary Kay Gomez is helping her set up couples therapy.    ROSY Garcia on 2/14/2023 at 1:11 PM    "

## 2023-02-15 ENCOUNTER — HOSPITAL ENCOUNTER (OUTPATIENT)
Dept: BEHAVIORAL HEALTH | Facility: CLINIC | Age: 68
Discharge: HOME | End: 2023-02-15
Attending: PSYCHIATRY & NEUROLOGY
Payer: MEDICARE

## 2023-02-15 PROCEDURE — 90853 GROUP PSYCHOTHERAPY: CPT | Mod: PO | Performed by: SOCIAL WORKER

## 2023-02-15 PROCEDURE — 90853 GROUP PSYCHOTHERAPY: CPT | Mod: GT

## 2023-02-15 NOTE — GROUP NOTE
Process Group Note    PATIENT'S NAME: Randi Cleary  MRN:   [id number]  :   1955  ACCT. NUMBER: [id number]  DATE OF SERVICE: 2/15/23  START TIME:  9:00 AM  END TIME:  9:50 AM  FACILITATOR: Brittani Molina [facility]  TOPIC:  Process Group    Diagnoses:  296.33 (F33.2) Major Depressive Disorder, Recurrent Episode, Severe With anxious distress  4. Other Diagnoses that is relevant to services:   300.02 (F41.1) Generalized Anxiety Disorder    5. Provisional Diagnosis:  296.89 Bipolar II Disorder vs. 314.01 (F90.2) Attention-Deficit/Hyperactivity Disorder   Combined presentation       Service Modality:  Video Visit     Telemedicine Visit: The patient's condition can be safely assessed and treated via synchronous audio and visual telemedicine encounter.       Reason for Telemedicine Visit: Services only offered telehealth and due to COVID-19.     Originating Site (Patient Location): Patient's home     Distant Site (Provider Location): Provider Remote Setting- Home Office     Consent:  The patient/guardian has verbally consented to: the potential risks and benefits of telemedicine (video visit) versus in person care; bill my insurance or make self-payment for services provided; and responsibility for payment of non-covered services.      Patient would like the video invitation sent by:  My Chart     Mode of Communication:  Video Conference via Medical Zoom     As the provider I attest to compliance with applicable laws and regulations related to telemedicine.    [facility] 55+ Program  TRACK: A1    NUMBER OF PARTICIPANTS: 5          Data:    Session content: At the start of this group, patients were invited to check in by identifying themselves, describing their current emotional status, and identifying issues to address in this group.   Area(s) of treatment focus addressed in this session included Symptom Management, Personal Safety, Community Resources/Discharge Planning, Abstinence/Relapse  Prevention, Develop / Improve Independent Living Skills and Develop Socialization / Interpersonal Relationship Skills.    Today continues her admission in [facility] by starting in the A1 track today. Introductions were provided. She was able to self disclose to peers mood symptoms leading to her admission. Winnie reports depressed mood with worry. Denies S/I or safety issues. She reports using coping skills for symptom management including listening to music. She processed her awareness into feelings including learning technology.      Therapeutic Interventions/Treatment Strategies:  Psychotherapist offered support, feedback and validation and reinforced use of skills. Treatment modalities used include Cognitive Behavioral Therapy.    Assessment:    Patient response:   Patient responded to session by accepting feedback, giving feedback, listening, focusing on goals, being attentive, accepting support and verbalizing understanding    Possible barriers to participation / learning include: and no barriers identified    Health Issues:   None reported. Reports medication compliance.       Substance Use Review:   Substance Use: No active concerns identified.    Mental Status/Behavioral Observations  Appearance:   Appropriate   Eye Contact:   Good   Psychomotor Behavior: Normal   Attitude:   Cooperative  Interested Pleasant  Orientation:   All  Speech   Rate / Production: Normal    Volume:  Normal   Mood:    Depressed   Affect:    Appropriate  Worrisome   Thought Content:   Clear and Safety denies any current safety concerns including suicidal ideation, self-harm, and homicidal ideation  Thought Form:  Coherent  Goal Directed  Logical     Insight:    Good     Plan:     Safety Plan: No current safety concerns identified.  Recommended that patient call 911 or go to the local ED should there be a change in any of these risk factors.     Barriers to treatment: None identified    Patient Contracts (see media tab):  None    Substance  Use: Not addressed in session     Continue or Discharge: Patient will continue in 55+ Program (55+) as planned. Patient is likely to benefit from learning and using skills as they work toward the goals identified in their treatment plan. Winnie will continue for mood stabilization and symptom management education for mental health recovery.      Brittani Molina, [facility]  February 15, 2023

## 2023-02-15 NOTE — GROUP NOTE
Psychoeducation Group Note    PATIENT'S NAME: Randi Cleary  MRN:   [id number]  :   1955  ACCT. NUMBER: [id number]  DATE OF SERVICE: 2/15/23  START TIME: 11:00 AM  END TIME: 11:50 AM  FACILITATOR: Luh Hare LGSW  TOPIC: MH Wellness Group: [facility] 55+ Program  TRACK: A1    NUMBER OF PARTICIPANTS: 5    Summary of Group / Topics Discussed:  Foundations of Health: Sleep: Case study/sleep hygiene: Patients explored the connection between sleep and mental illness. Patients learned about how adequate sleep can improve health, productivity, wellness, quality of life, and safety.     Patient Session Goals / Objectives:  ? Demonstrated understanding of sleep hygiene practices and benefits of sleep  ? Identified sleep hygiene strategies to utilize     Described the connection between sleep disturbances and mental illness                                     Service Modality:  Video Visit     Telemedicine Visit: The patient's condition can be safely assessed and treated via synchronous audio and visual telemedicine encounter.      Reason for Telemedicine Visit: Services only offered telehealth    Originating Site (Patient Location): Patient's home    Distant Site (Provider Location): Provider Remote Setting- Home Office    Consent:  The patient/guardian has verbally consented to: the potential risks and benefits of telemedicine (video visit) versus in person care; bill my insurance or make self-payment for services provided; and responsibility for payment of non-covered services.     Patient would like the video invitation sent by:  My Chart    Mode of Communication:  Video Conference via Medical Zoom    As the provider I attest to compliance with applicable laws and regulations related to telemedicine.                               Patient Participation / Response:  Fully participated with the group by sharing personal reflections / insights and openly received / provided feedback  with other participants.    Demonstrated understanding of topics discussed through group discussion and participation, Identified / Expressed personal readiness to practice skills and Verbalized understanding of foundations of health topic    Treatment Plan:  Patient has a current master individualized treatment plan.  See Epic treatment plan for more information.    Luh Hare LGSW

## 2023-02-15 NOTE — GROUP NOTE
Psychotherapy Group Note    PATIENT'S NAME: Randi lCeary  MRN:   [id number]  :   1955  ACCT. NUMBER: [id number]  DATE OF SERVICE: 2/15/23  START TIME: 10:00 AM  END TIME: 10:50 AM  FACILITATOR: Brittani Molina LICSW  TOPIC: MH EBP Group: Specialty Awareness  Service Modality:  Video Visit     Telemedicine Visit: The patient's condition can be safely assessed and treated via synchronous audio and visual telemedicine encounter.       Reason for Telemedicine Visit: Services only offered telehealth and due to COVID-19.     Originating Site (Patient Location): Patient's home     Distant Site (Provider Location): Provider Remote Setting- Home Office     Consent:  The patient/guardian has verbally consented to: the potential risks and benefits of telemedicine (video visit) versus in person care; bill my insurance or make self-payment for services provided; and responsibility for payment of non-covered services.      Patient would like the video invitation sent by:  My Chart     Mode of Communication:  Video Conference via Medical Zoom     As the provider I attest to compliance with applicable laws and regulations related to telemedicine.     [facility] 55+ Program  TRACK: B1    NUMBER OF PARTICIPANTS: 5    Summary of Group / Topics Discussed:  Specialty Topics: Life Transitions: The topic of life transitions was presented in order to help patients to better understand the challenges presented by life transitions, and how to best navigate them. Exploring the phases of transition and how one works through them was discussed. Patients were provided with information regarding community resources.     Patient Session Goals / Objectives:    Discussed the timing and nature of major life transitions    Explored how life transitions may impact mental health and functioning    Discussed coping strategies to manage symptoms and help with transitioning    Discussed and planned a successful  transition      Patient Participation / Response:  Fully participated with the group by sharing personal reflections / insights and openly received / provided feedback with other participants.    Identified / Expressed readiness to act on skill suggestions discussed in topic and Practiced skills in session    Treatment Plan:  Patient has a current master individualized treatment plan.  See Epic treatment plan for more information.    Brittani Molina, LICSW

## 2023-02-16 ENCOUNTER — MEDICAL CORRESPONDENCE (OUTPATIENT)
Dept: NEUROSURGERY | Facility: CLINIC | Age: 68
End: 2023-02-16
Payer: MEDICARE

## 2023-02-16 ENCOUNTER — VIRTUAL VISIT (OUTPATIENT)
Dept: PSYCHOLOGY | Facility: CLINIC | Age: 68
End: 2023-02-16
Payer: MEDICARE

## 2023-02-16 DIAGNOSIS — F31.81 BIPOLAR 2 DISORDER (H): Primary | ICD-10-CM

## 2023-02-16 DIAGNOSIS — F41.1 GENERALIZED ANXIETY DISORDER: ICD-10-CM

## 2023-02-16 DIAGNOSIS — F43.9 TRAUMA AND STRESSOR-RELATED DISORDER: ICD-10-CM

## 2023-02-16 PROCEDURE — 90834 PSYTX W PT 45 MINUTES: CPT | Mod: VID | Performed by: SOCIAL WORKER

## 2023-02-16 NOTE — PROGRESS NOTES
[facility]                                     Progress Note    Patient Name: Randi Cleary  Date: 2/16/23         Service Type: Individual     Session Start Time: 11:48 AM  Session End Time: 12:32 PM     Session Length: 44 minutes    Session #: 162    Attendees: Client attended alone    Service Modality:  Video Visit:      Provider verified identity through the following two step process.  Patient provided:  Patient is known previously to provider    Telemedicine Visit: The patient's condition can be safely assessed and treated via synchronous audio and visual telemedicine encounter.      Reason for Telemedicine Visit: Patient convenience (e.g. access to timely appointments / distance to available provider)    Originating Site (Patient Location): Patient's home    Distant Site (Provider Location): Provider Remote Setting- Home Office    Consent:  The patient/guardian has verbally consented to: the potential risks and benefits of telemedicine (video visit) versus in person care; bill my insurance or make self-payment for services provided; and responsibility for payment of non-covered services.     Patient would like the video invitation sent by:  My Chart    Mode of Communication:  [facility] Conference [facility]    Distant Location (Provider):  Off-site    As the provider I attest to compliance with applicable laws and regulations related to telemedicine.    DATA  Extended Session (53+ minutes): No  Interactive Complexity: No  Crisis: No        Progress Since Last Session (Related to Symptoms / Goals / Homework):   Symptoms: Patient continues to feel anxious and overwhelmed with labile mood    Homework: Progress made  Focus on day treatment -done  Look at a budget -done  Get back to the pool -done  Finish up taxes -not done  Call about couples counseling - uncertain  Talk to your primary care doctor about what's on your list (mood, neck, arthritis) -not yet     Episode of Care Goals:  Satisfactory progress - ACTION (Actively working towards change); Intervened by reinforcing change plan / affirming steps taken     Current / Ongoing Stressors and Concerns:   Patient is currently socially isolated. She has a conflictual relationship with her .  She is getting minimal physical activity.  She had recent eye surgery and shoulder surgery.     Treatment Objective(s) Addressed in This Session:   Patient will increase frequency of engaging her in ADLs.  Patient will track and record at least 5 pleasant exchanges with . Patient will be able to identify at least 5 positive traits about her .  Patient will reduce level of depressive and anxious features as evidenced by reduction in score on her CHAVO-7 and PHQ-9 (scores of 15 and 16 at first measurment, respectively).     Intervention:   Supportive Therapy: Patient was worried about her finances and was verbally processing them. Discussed health concerns. Processed group starting. Reviewed homework and identified successes and barriers to completion.      The following assessments were completed by patient for this visit:  PHQ9: of note, patient reported she was in a hurry and didn't read all questions, she reports no suicidal ideation  PHQ-9 SCORE 1/5/2023 1/10/2023 1/11/2023 1/11/2023 1/30/2023 2/5/2023 2/14/2023   PHQ-9 Total Score MyChart 16 (Moderately severe depression) 16 (Moderately severe depression) - 15 (Moderately severe depression) 13 (Moderate depression) 18 (Moderately severe depression) 18 (Moderately severe depression)   PHQ-9 Total Score 16 16 15 15 13 18 18   Some encounter information is confidential and restricted. Go to Review Flowsheets activity to see all data.     GAD7:   CHAVO-7 SCORE 12/15/2022 12/15/2022 1/5/2023 1/5/2023 1/5/2023 1/30/2023 2/5/2023   Total Score - 19 (severe anxiety) - - 17 (severe anxiety) 17 (severe anxiety) 16 (severe anxiety)   Total Score 19 19 17 17 17 17 16   Some encounter information is  confidential and restricted. Go to Review Flowsheets activity to see all data.     PROMIS 10-Global Health (only subscores and total score):   PROMIS-10 Scores Only 1/5/2023 1/5/2023 2/5/2023 2/5/2023 2/5/2023 2/5/2023 2/5/2023   Global Mental Health Score 5 5 5 5 5 5 5   Global Physical Health Score 8 8 8 8 8 8 8   PROMIS TOTAL - SUBSCORES 13 13 13 13 13 13 13   Some encounter information is confidential and restricted. Go to Review Flowsheets activity to see all data.        ASSESSMENT: Current Emotional / Mental Status (status of significant symptoms):   Risk status (Self / Other harm or suicidal ideation)   Patient denies current fears or concerns for personal safety.   Patient denies current or recent suicidal ideation or behaviors.   Patient denies current or recent homicidal ideation or behaviors.   Patient denies current or recent self injurious behavior or ideation.   Patient denies other safety concerns.   Patient reports there has been no change in risk factors since their last session.     Patient reports there has been no change in protective factors since their last session.     A safety and risk management plan has been developed including: Patient consented to co-developed safety plan on 11/24/2020.  Safety and risk management plan was reviewed.   Patient agreed to use safety plan should any safety concerns arise.  A copy was made available to the patient.     Appearance:   Appropriate    Eye Contact:   Good    Psychomotor Behavior: Normal    Attitude:   Cooperative    Orientation:   All   Speech    Rate / Production: Normal/ Responsive    Volume:  Normal    Mood:    Normal   Affect:    Appropriate    Thought Content:  Clear    Thought Form:  Coherent    Insight:    Good      Medication Review:   No changes to current psychiatric medication(s)     Medication Compliance:   Yes     Changes in Health Issues:   None reported     Chemical Use Review:   Substance Use: Chemical use reviewed, no active  concerns identified Nothing used since 2021.     Tobacco Use: No current tobacco use.      Diagnosis:  1. Bipolar 2 disorder (H)    2. Generalized anxiety disorder    3. Trauma and stressor-related disorder      Collateral Reports Completed:   Not Applicable    PLAN: (Patient Tasks / Therapist Tasks / Other)  Focus on day treatment  Look at a budget  Get back to the pool  Finish up taxes  Call about couples counseling  Talk to your primary care doctor about what's on your list (mood, neck, arthritis)        There has been demonstrated improvement in functioning while patient has been engaged in psychotherapy/psychological service- if withdrawn the patient would deteriorate and/or relapse.     MICAELA SLADE   2023                                                        ______________________________________________________________________    Individual Treatment Plan    Patient's Name: Randi Cleary  YOB: 1955    Date of Creation: 20  Date Treatment Plan Last Reviewed/Revised: 22    DSM5 Diagnoses: 296.89 Bipolar II Disorder Depressed, 300.02 (F41.1) Generalized Anxiety Disorder or Adjustment Disorders  309.89 (F43.8) Other Specified Trauma and Stressor Related Disorder  Psychosocial / Contextual Factors: Patient's entire family of origin has , she now has a sister-in-law and  as support.  Relationship with  is conflictual. She is recovering from surgeries  PROMIS (reviewed every 90 days): PROMIS-10 Scores  PROMIS 10-Global Health (only subscores and total score):   PROMIS-10 Scores Only 2021 3/15/2022 3/15/2022 3/15/2022 3/24/2022 2022 2022   Global Mental Health Score 6 6 6 6 8 12 12   Global Physical Health Score 9 9 9 9 8 11 10   PROMIS TOTAL - SUBSCORES 15 15 15 15 16 23 22       Referral / Collaboration:  Referral to another professional/service is not indicated at this time..    Anticipated number of session for this episode  "of care: 50  Anticipation frequency of session: Biweekly  Anticipated Duration of each session: 53 or more minutes due to intensity of trauma symptoms  Treatment plan will be reviewed in 90 days or when goals have been changed.   There has been demonstrated improvement in functioning while patient has been engaged in psychotherapy/psychological service- if withdrawn the patient would deteriorate and/or relapse.       MeasurableTreatment Goal(s) related to diagnosis / functional impairment(s)  Goal 1: Patient will \"jumpstart, getting going with the things I need to be doing around the house as far as picking up, doing things, trying to do something every day.  Also to lessen the animosity between me and my .\"    I will know I've met my goal when my shoulders are fixed and I can see.      Objective #A (Patient Action)    Patient will increase frequency of engaging her in ADLs.  Status: Continued - Date(s): 12/10/21, 3/9/22, 6/9/22, 9/2/22, 12/1/22    Intervention(s)  Therapist will engage patient in CBT, specifically behavioral activation.    Objective #B  Patient will  track and record at least 5 pleasant exchanges with . Patient will be able to identify at least 5 positive traits about her  and how he relates to her.  Status: Continued - Date(s): 12/10/21, 3/9/22, 6/9/22, 9/2/22, 12/1/22    Intervention(s)  Therapist will teach assertiveness skills and assign homework related to relationship interactions.    Objective #C  Patient will reduce level of depressive and anxious features as evidenced by reduction in score on her CHAVO-7 and PHQ-9 (scores of 15 and 16 at first measurment, respectively).  Status: Continued - Date(s): 12/10/21, 3/9/22, 6/9/22, 9/2/22, 12/1/22    Intervention(s)  Therapist will engage patient in person-centered therapy and CBT.    Patient has reviewed and agreed to the above plan.      MICAELA SLADE  December 1, " "2022                                                   Randi Cleary          SAFETY PLAN:  Step 1: Warning signs / cues (Thoughts, images, mood, situation, behavior) that a crisis may be developing:  ? Thoughts: \"I don't want to continue\" \"I am unwanted\"  ? Images: none  ? Thinking Processes: ruminating  ? Mood: anger  ? Behaviors: isolating/withdrawing , can't stop crying, not taking care of myself and not taking care of my responsibilities  ? Situations: small triggers, such as not being able to find something, or dropping something   Step 2: Coping strategies - Things I can do to take my mind off of my problems without contacting another person (relaxation technique, physical activity):  ? Distress Tolerance Strategies:  arts and crafts: drawing, play with my pet , listen to positive and upbeat music: any, change body temperature (ice pack/cold water)  and paced breathing/progressive muscle relaxation  ? Physical Activities: go for a walk, deep breathing and stretching   ? Focus on helpful thoughts:  \"You've been through this before, you can get through it again.\"  Step 3: People and social settings that provide distraction:                 Name: Carmen                            Name: Darien                           Name: Aleida       ? pool, shopping, Carmen's house, [employer]       Step 4: Remind myself of people and things that are important to me and worth living for:  Clifford Little Donna, post-COVID world, options of what could be in your future        Step 5: When I am in crisis, I can ask these people to help me use my safety plan:                 Name: Sidney  Step 6: Making the environment safe:   ? go to sleep/daydream  Step 7: Professionals or agencies I can contact during a crisis:  ? [facility] Daytime Number: [phone]  ? Suicide Prevention Lifeline: 0-731-440-OBIN (2935)  ? Crisis Text Line Service (available 24 hours a day, 7 days a week): Text [place] to 585053    Local Crisis " Services: [facility] Crisis: [phone]     Call 911 or go to my nearest emergency department.       I helped develop this safety plan and agree to use it when needed.  I have been given a copy of this plan.       Client signature _________________________________________________________________  Today s date:  11/24/2020  Adapted from Safety Plan Template 2008 Randi Poole and Robby Barba is reprinted with the express permission of the authors.  No portion of the Safety Plan Template may be reproduced without the express, written permission.  You can contact the authors at [email or web address] or [email or web address].

## 2023-02-17 ENCOUNTER — HOSPITAL ENCOUNTER (OUTPATIENT)
Dept: BEHAVIORAL HEALTH | Facility: CLINIC | Age: 68
Discharge: HOME | End: 2023-02-17
Attending: PSYCHIATRY & NEUROLOGY
Payer: MEDICARE

## 2023-02-17 PROCEDURE — 90853 GROUP PSYCHOTHERAPY: CPT | Mod: PO | Performed by: SOCIAL WORKER

## 2023-02-17 PROCEDURE — 90853 GROUP PSYCHOTHERAPY: CPT | Mod: GT

## 2023-02-17 PROCEDURE — 90853 GROUP PSYCHOTHERAPY: CPT | Mod: GT | Performed by: SOCIAL WORKER

## 2023-02-17 NOTE — GROUP NOTE
Psychotherapy Group Note    PATIENT'S NAME: Randi Cleary  MRN:   [id number]  :   1955  ACCT. NUMBER: [id number]  DATE OF SERVICE: 23  START TIME: 11:00 AM  END TIME: 11:50 AM  FACILITATOR: Luh Hare LGSW  TOPIC: MH EBP Group: Cognitive Restructuring   [facility] [place] 55+ [facility]  TRACK: A1    NUMBER OF PARTICIPANTS: 4    Summary of Group / Topics Discussed:  Cognitive Restructuring: Socratic Questioning: Patients received an overview of what automatic thoughts are and how they develop. Patients then began to identify their negative automatic thoughts. Patients worked to modify inaccurate negative beliefs with the goal of improved self-image and functioning.     Patient Session Goals / Objectives:    Familiarize self with the concept of automatic thoughts and how they develop.      Explore personal automatic thoughts (positive and negative)    Develop / advance recognition of the connection between negative thoughts feelings, and behaviors    Use socratic questioning to formulate new neutral/positive core beliefs                                      Service Modality:  Video Visit     Telemedicine Visit: The patient's condition can be safely assessed and treated via synchronous audio and visual telemedicine encounter.      Reason for Telemedicine Visit: Services only offered telehealth    Originating Site (Patient Location): Patient's home    Distant Site (Provider Location): Provider Remote Setting- Home Office    Consent:  The patient/guardian has verbally consented to: the potential risks and benefits of telemedicine (video visit) versus in person care; bill my insurance or make self-payment for services provided; and responsibility for payment of non-covered services.     Patient would like the video invitation sent by:  My Chart    Mode of Communication:  Video Conference via Medical Zoom    As the provider I attest to compliance with applicable laws and regulations related to telemedicine.                                       Patient Participation / Response:  Fully participated with the group by sharing personal reflections / insights and openly received / provided feedback with other participants.    Demonstrated understanding of topics discussed through group discussion and participation, Expressed understanding of the relationship between behaviors, thoughts, and feelings, Demonstrated knowledge of personal thought patterns and how they impact their mood and behavior., Identified barriers to changing thought patterns, Identified / Expressed personal readiness to practice CBT and Verbalized understanding of patient's own thought patterns and how they impact their mood and behaviors    Treatment Plan:  Patient has a current master individualized treatment plan.  See Epic treatment plan for more information.    Luh Hare LGSW

## 2023-02-17 NOTE — GROUP NOTE
Psychoeducation Group Note    PATIENT'S NAME: Randi Cleary  MRN:   [id number]  :   1955  ACCT. NUMBER: [id number]  DATE OF SERVICE: 23  START TIME: 10:00 AM  END TIME: 10:50 AM  FACILITATOR: Brittani Molina LICSW  TOPIC: MH Wellness Group: Health Maintenance  Service Modality:  Video Visit     Telemedicine Visit: The patient's condition can be safely assessed and treated via synchronous audio and visual telemedicine encounter.       Reason for Telemedicine Visit: Services only offered telehealth and due to COVID-19.     Originating Site (Patient Location): Patient's home     Distant Site (Provider Location): Provider Remote Setting- Home Office     Consent:  The patient/guardian has verbally consented to: the potential risks and benefits of telemedicine (video visit) versus in person care; bill my insurance or make self-payment for services provided; and responsibility for payment of non-covered services.      Patient would like the video invitation sent by:  My Chart     Mode of Communication:  Video Conference via Medical Zoom     As the provider I attest to compliance with applicable laws and regulations related to telemedicine.     [facility] [place] 55+ Program  TRACK: A1    NUMBER OF PARTICIPANTS: 6    Summary of Group / Topics Discussed:  Health Maintenance: Weekend planning: Patients were given time to complete a weekend plan of what they will do to promote wellness and sobriety over the weekend when they do not have the structure of group. Patients were encouraged to review progress on their treatment goals and were challenged to identify ways to work toward meeting them. Patients identified and discussed foreseeable barriers to success over the weekend and then developed a plan to overcome them. Patients reviewed their distress coping skills and social support network and discussed this with the group.       Patient Session Goals / Objectives:    ?    Identified activities to engage  in that promote balance in wellness  ?    Distinguished possible barriers to success over the weekend and created a plan to overcome them  ?    Listed distress coping skills and identified social support network to utilize if in crisis during the weekend        Patient Participation / Response:  Fully participated with the group by sharing personal reflections / insights and openly received / provided feedback with other participants.    Demonstrated understanding of topics discussed through group discussion and participation and Verbalized understanding of health maintenance topic    Treatment Plan:  Patient has a current master individualized treatment plan.  See Epic treatment plan for more information.    Brittani Molina, LICSW

## 2023-02-17 NOTE — GROUP NOTE
Process Group Note    PATIENT'S NAME: Randi Cleary  MRN:   [id number]  :   1955  ACCT. NUMBER: [id number]  DATE OF SERVICE: 23  START TIME:  9:00 AM  END TIME:  9:50 AM  FACILITATOR: Brittani Molina [facility]  TOPIC:  Process Group    Diagnoses:  296.33 (F33.2) Major Depressive Disorder, Recurrent Episode, Severe With anxious distress  4. Other Diagnoses that is relevant to services:   300.02 (F41.1) Generalized Anxiety Disorder    5. Provisional Diagnosis:  296.89 Bipolar II Disorder vs. 314.01 (F90.2) Attention-Deficit/Hyperactivity Disorder   Combined presentation          Service Modality:  Video Visit     Telemedicine Visit: The patient's condition can be safely assessed and treated via synchronous audio and visual telemedicine encounter.       Reason for Telemedicine Visit: Services only offered telehealth and due to COVID-19.     Originating Site (Patient Location): Patient's home     Distant Site (Provider Location): Provider Remote Setting- Home Office     Consent:  The patient/guardian has verbally consented to: the potential risks and benefits of telemedicine (video visit) versus in person care; bill my insurance or make self-payment for services provided; and responsibility for payment of non-covered services.      Patient would like the video invitation sent by:  My Chart     Mode of Communication:  Video Conference via Medical Zoom     As the provider I attest to compliance with applicable laws and regulations related to telemedicine.    [facility] 55+ Program  TRACK: A1    NUMBER OF PARTICIPANTS: 6          Data:    Session content: At the start of this group, patients were invited to check in by identifying themselves, describing their current emotional status, and identifying issues to address in this group.   Area(s) of treatment focus addressed in this session included Symptom Management, Personal Safety, Community Resources/Discharge Planning, Abstinence/Relapse  "Prevention, Develop / Improve Independent Living Skills and Develop Socialization / Interpersonal Relationship Skills.    Winnie reports depressed and anxious mood with tearful and angry affect. Denies S/I or safety issues. Denies panic attacks.  She processed being overwhelmed, frustrated and feels \"harassed\" by the billing department related to a medical bill. She is consulting a . She is aware of coping skills for symptom management. Her  is home for support.   Winnie has a weekend plan to structure her time.       Therapeutic Interventions/Treatment Strategies:  Psychotherapist offered support, feedback and validation and reinforced use of skills. Treatment modalities used include Cognitive Behavioral Therapy.    Assessment:    Patient response:   Patient responded to session by accepting feedback, giving feedback, listening, focusing on goals, being attentive, accepting support and verbalizing understanding    Possible barriers to participation / learning include: and no barriers identified    Health Issues:   None reported. Medication compliance.       Substance Use Review:   Substance Use: No active concerns identified.    Mental Status/Behavioral Observations  Appearance:   Appropriate   Eye Contact:   Good   Psychomotor Behavior: Normal   Attitude:   Cooperative  Interested  Orientation:   All  Speech   Rate / Production: Emotional Normal    Volume:  Normal   Mood:    Angry  Anxious  Depressed   Affect:    Appropriate  Labile  Tearful Worrisome   Thought Content:   Clear and Safety denies any current safety concerns including suicidal ideation, self-harm, and homicidal ideation  Thought Form:  Coherent  Goal Directed  Logical     Insight:    Good     Plan:     Safety Plan: No current safety concerns identified.  Recommended that patient call 911 or go to the local ED should there be a change in any of these risk factors.     Barriers to treatment: None identified    Patient Contracts (see media " tab):  None    Substance Use: Not addressed in session     Continue or Discharge: Patient will continue in 55+ Program (55+) as planned. Patient is likely to benefit from learning and using skills as they work toward the goals identified in their treatment plan. Winnie will continue for mood stabilization and symptom management education for mental health recovery.      Brittani Molina, [facility]  February 17, 2023

## 2023-02-20 ENCOUNTER — VIRTUAL VISIT (OUTPATIENT)
Dept: PSYCHOLOGY | Facility: CLINIC | Age: 68
End: 2023-02-20
Payer: MEDICARE

## 2023-02-20 DIAGNOSIS — F41.1 GENERALIZED ANXIETY DISORDER: ICD-10-CM

## 2023-02-20 DIAGNOSIS — F31.81 BIPOLAR 2 DISORDER (H): Primary | ICD-10-CM

## 2023-02-20 DIAGNOSIS — F43.9 TRAUMA AND STRESSOR-RELATED DISORDER: ICD-10-CM

## 2023-02-20 PROCEDURE — 90837 PSYTX W PT 60 MINUTES: CPT | Mod: VID | Performed by: SOCIAL WORKER

## 2023-02-20 ASSESSMENT — ANXIETY QUESTIONNAIRES
3. WORRYING TOO MUCH ABOUT DIFFERENT THINGS: MORE THAN HALF THE DAYS
5. BEING SO RESTLESS THAT IT IS HARD TO SIT STILL: SEVERAL DAYS
5. BEING SO RESTLESS THAT IT IS HARD TO SIT STILL: SEVERAL DAYS
8. IF YOU CHECKED OFF ANY PROBLEMS, HOW DIFFICULT HAVE THESE MADE IT FOR YOU TO DO YOUR WORK, TAKE CARE OF THINGS AT HOME, OR GET ALONG WITH OTHER PEOPLE?: EXTREMELY DIFFICULT
3. WORRYING TOO MUCH ABOUT DIFFERENT THINGS: MORE THAN HALF THE DAYS
7. FEELING AFRAID AS IF SOMETHING AWFUL MIGHT HAPPEN: MORE THAN HALF THE DAYS
GAD7 TOTAL SCORE: 17
GAD7 TOTAL SCORE: 17
7. FEELING AFRAID AS IF SOMETHING AWFUL MIGHT HAPPEN: MORE THAN HALF THE DAYS
IF YOU CHECKED OFF ANY PROBLEMS ON THIS QUESTIONNAIRE, HOW DIFFICULT HAVE THESE PROBLEMS MADE IT FOR YOU TO DO YOUR WORK, TAKE CARE OF THINGS AT HOME, OR GET ALONG WITH OTHER PEOPLE: EXTREMELY DIFFICULT
5. BEING SO RESTLESS THAT IT IS HARD TO SIT STILL: SEVERAL DAYS
GAD7 TOTAL SCORE: 17
7. FEELING AFRAID AS IF SOMETHING AWFUL MIGHT HAPPEN: MORE THAN HALF THE DAYS
2. NOT BEING ABLE TO STOP OR CONTROL WORRYING: NEARLY EVERY DAY
7. FEELING AFRAID AS IF SOMETHING AWFUL MIGHT HAPPEN: MORE THAN HALF THE DAYS
GAD7 TOTAL SCORE: 17
6. BECOMING EASILY ANNOYED OR IRRITABLE: NEARLY EVERY DAY
GAD7 TOTAL SCORE: 17
7. FEELING AFRAID AS IF SOMETHING AWFUL MIGHT HAPPEN: MORE THAN HALF THE DAYS
8. IF YOU CHECKED OFF ANY PROBLEMS, HOW DIFFICULT HAVE THESE MADE IT FOR YOU TO DO YOUR WORK, TAKE CARE OF THINGS AT HOME, OR GET ALONG WITH OTHER PEOPLE?: EXTREMELY DIFFICULT
GAD7 TOTAL SCORE: 17
4. TROUBLE RELAXING: NEARLY EVERY DAY
GAD7 TOTAL SCORE: 17
8. IF YOU CHECKED OFF ANY PROBLEMS, HOW DIFFICULT HAVE THESE MADE IT FOR YOU TO DO YOUR WORK, TAKE CARE OF THINGS AT HOME, OR GET ALONG WITH OTHER PEOPLE?: EXTREMELY DIFFICULT
1. FEELING NERVOUS, ANXIOUS, OR ON EDGE: NEARLY EVERY DAY
1. FEELING NERVOUS, ANXIOUS, OR ON EDGE: NEARLY EVERY DAY
4. TROUBLE RELAXING: NEARLY EVERY DAY
7. FEELING AFRAID AS IF SOMETHING AWFUL MIGHT HAPPEN: MORE THAN HALF THE DAYS
GAD7 TOTAL SCORE: 17
IF YOU CHECKED OFF ANY PROBLEMS ON THIS QUESTIONNAIRE, HOW DIFFICULT HAVE THESE PROBLEMS MADE IT FOR YOU TO DO YOUR WORK, TAKE CARE OF THINGS AT HOME, OR GET ALONG WITH OTHER PEOPLE: EXTREMELY DIFFICULT
6. BECOMING EASILY ANNOYED OR IRRITABLE: NEARLY EVERY DAY
2. NOT BEING ABLE TO STOP OR CONTROL WORRYING: NEARLY EVERY DAY
4. TROUBLE RELAXING: NEARLY EVERY DAY
GAD7 TOTAL SCORE: 17
IF YOU CHECKED OFF ANY PROBLEMS ON THIS QUESTIONNAIRE, HOW DIFFICULT HAVE THESE PROBLEMS MADE IT FOR YOU TO DO YOUR WORK, TAKE CARE OF THINGS AT HOME, OR GET ALONG WITH OTHER PEOPLE: EXTREMELY DIFFICULT
2. NOT BEING ABLE TO STOP OR CONTROL WORRYING: NEARLY EVERY DAY
1. FEELING NERVOUS, ANXIOUS, OR ON EDGE: NEARLY EVERY DAY
6. BECOMING EASILY ANNOYED OR IRRITABLE: NEARLY EVERY DAY
3. WORRYING TOO MUCH ABOUT DIFFERENT THINGS: MORE THAN HALF THE DAYS

## 2023-02-20 NOTE — PROGRESS NOTES
[facility]                                     Progress Note    Patient Name: Randi Cleary  Date: 2/20/23         Service Type: Individual     Session Start Time: 11:30 AM  Session End Time: 12:24 PM     Session Length: 54 minutes    Session #: 163    Attendees: Client attended alone    Service Modality:  Video Visit:      Provider verified identity through the following two step process.  Patient provided:  Patient is known previously to provider    Telemedicine Visit: The patient's condition can be safely assessed and treated via synchronous audio and visual telemedicine encounter.      Reason for Telemedicine Visit: Patient convenience (e.g. access to timely appointments / distance to available provider)    Originating Site (Patient Location): Patient's home    Distant Site (Provider Location): Provider Remote Setting- Home Office    Consent:  The patient/guardian has verbally consented to: the potential risks and benefits of telemedicine (video visit) versus in person care; bill my insurance or make self-payment for services provided; and responsibility for payment of non-covered services.     Patient would like the video invitation sent by:  My Chart    Mode of Communication:  Video Conference via [facility]    Distant Location (Provider):  Off-site    As the provider I attest to compliance with applicable laws and regulations related to telemedicine.    DATA  Extended Session (53+ minutes): PROLONGED SERVICE IN THE OUTPATIENT SETTING REQUIRING DIRECT (FACE-TO-FACE) PATIENT CONTACT BEYOND THE USUAL SERVICE:    - High distress and under complex circumstances.  See Data section for details  Interactive Complexity: No  Crisis: No        Progress Since Last Session (Related to Symptoms / Goals / Homework):   Symptoms: Patient is overwhelmed, she had an incident over the weekend where she had intense panic and hopelessness which led to passive ideation.     Homework: Progress made  Focus on  "day treatment -done  Look at a budget  Get back to the pool  Finish up taxes  Call about couples counseling  Talk to your primary care doctor about what's on your list (mood, neck, arthritis)     Episode of Care Goals: Satisfactory progress - ACTION (Actively working towards change); Intervened by reinforcing change plan / affirming steps taken     Current / Ongoing Stressors and Concerns:   Patient is currently socially isolated. She has a conflictual relationship with her .  She is getting minimal physical activity.  She had recent eye surgery and shoulder surgery.     Treatment Objective(s) Addressed in This Session:   Patient will increase frequency of engaging her in ADLs.  Patient will track and record at least 5 pleasant exchanges with . Patient will be able to identify at least 5 positive traits about her .  Patient will reduce level of depressive and anxious features as evidenced by reduction in score on her CHAVO-7 and PHQ-9 (scores of 15 and 16 at first measurment, respectively).     Intervention:   Supportive Therapy: Processed situation where she learned her insurance was cancelled and how she was overwhelmed with this. Talked about the impact on this with day treatment and how she's worried about it. Ensured patient had billing number to call and discuss concerns regarding this. Processed how she's been thinking a lot of an hold friend since starting group and how learning of her passing has impacted her. Talked about how this led to some suicidal ideation over the weekend, where patient looked at her safety plan, but couldn't find a phone number for a [place] specific crisis line, so she just \"toughed it out.\" Reviewed her safety plan and updated this, adding information about [facility]. Reviewed options for her, including [facility]'s Crisis Line as well as [facility].    Talked through her hopelessness, of not seeing how to reach any of her goals as she can't see all of the steps " on her journey forward. Help patient realize she didn't need to know all the steps, but just the next one. Helped her identify one small next step.      The following assessments were completed by patient for this visit:  PHQ9: of note, patient reported she was in a hurry and didn't read all questions, she reports no suicidal ideation  PHQ-9 SCORE 1/5/2023 1/10/2023 1/11/2023 1/11/2023 1/30/2023 2/5/2023 2/14/2023   PHQ-9 Total Score MyChart 16 (Moderately severe depression) 16 (Moderately severe depression) - 15 (Moderately severe depression) 13 (Moderate depression) 18 (Moderately severe depression) 18 (Moderately severe depression)   PHQ-9 Total Score 16 16 15 15 13 18 18   Some encounter information is confidential and restricted. Go to Review [facility] activity to see all data.     GAD7:   CHAVO-7 SCORE 1/30/2023 2/5/2023 2/20/2023 2/20/2023 2/20/2023 2/20/2023 2/20/2023   Total Score 17 (severe anxiety) 16 (severe anxiety) - - - - 17 (severe anxiety)   Total Score 17 16 17 17 17 17 17   Some encounter information is confidential and restricted. Go to Review [employer] activity to see all data.     PROMIS 10-Global Health (only subscores and total score):   PROMIS-10 Scores Only 1/5/2023 1/5/2023 2/5/2023 2/5/2023 2/5/2023 2/5/2023 2/5/2023   Global Mental Health Score 5 5 5 5 5 5 5   Global Physical Health Score 8 8 8 8 8 8 8   PROMIS TOTAL - SUBSCORES 13 13 13 13 13 13 13   Some encounter information is confidential and restricted. Go to Review Flowsheets activity to see all data.        ASSESSMENT: Current Emotional / Mental Status (status of significant symptoms):   Risk status (Self / Other harm or suicidal ideation)   Patient denies current fears or concerns for personal safety.   Patient reports the following current or recent suicidal ideation or behaviors: increase in passive ideation over the weekend, felt like an intense sense of panic and hopelessness.   Patient denies current or recent homicidal  ideation or behaviors.   Patient denies current or recent self injurious behavior or ideation.   Patient denies other safety concerns.   Patient reports there has been no change in risk factors since their last session.     Patient reports there has been no change in protective factors since their last session.     A safety and risk management plan has been developed including: Patient consented to co-developed safety plan on 11/24/2020, updated 2/20/23.  Safety and risk management plan was reviewed.   Patient agreed to use safety plan should any safety concerns arise.  A copy was made available to the patient.     Appearance:   Appropriate    Eye Contact:   Good    Psychomotor Behavior: Normal    Attitude:   Cooperative    Orientation:   All   Speech    Rate / Production: Normal/ Responsive    Volume:  Normal    Mood:    Normal   Affect:    Appropriate    Thought Content:  Clear    Thought Form:  Coherent    Insight:    Good      Medication Review:   No changes to current psychiatric medication(s)     Medication Compliance:   Yes     Changes in Health Issues:   None reported     Chemical Use Review:   Substance Use: Chemical use reviewed, no active concerns identified Nothing used since August 2021.     Tobacco Use: No current tobacco use.      Diagnosis:  1. Bipolar 2 disorder (H)    2. Generalized anxiety disorder    3. Trauma and stressor-related disorder      Collateral Reports Completed:   Not Applicable    PLAN: (Patient Tasks / Therapist Tasks / Other)  Focus on day treatment  Look at a budget  Get back to the pool  Finish up taxes  Talk to your primary care doctor about what's on your list (mood, neck, arthritis)        There has been demonstrated improvement in functioning while patient has been engaged in psychotherapy/psychological service- if withdrawn the patient would deteriorate and/or relapse.     MICAELA SLADE   February 20, 2023                                                     "    ______________________________________________________________________    Individual Treatment Plan    Patient's Name: Randi Cleary  YOB: 1955    Date of Creation: 20  Date Treatment Plan Last Reviewed/Revised: 22    DSM5 Diagnoses: 296.89 Bipolar II Disorder Depressed, 300.02 (F41.1) Generalized Anxiety Disorder or Adjustment Disorders  309.89 (F43.8) Other Specified Trauma and Stressor Related Disorder  Psychosocial / Contextual Factors: Patient's entire family of origin has , she now has a sister-in-law and  as support.  Relationship with  is conflictual. She is recovering from surgeries  PROMIS (reviewed every 90 days): PROMIS-10 Scores  PROMIS 10-Global Health (only subscores and total score):   PROMIS-10 Scores Only 2021 3/15/2022 3/15/2022 3/15/2022 3/24/2022 2022 2022   Global Mental Health Score 6 6 6 6 8 12 12   Global Physical Health Score 9 9 9 9 8 11 10   PROMIS TOTAL - SUBSCORES 15 15 15 15 16 23 22       Referral / Collaboration:  Referral to another professional/service is not indicated at this time..    Anticipated number of session for this episode of care: 50  Anticipation frequency of session: Biweekly  Anticipated Duration of each session: 53 or more minutes due to intensity of trauma symptoms  Treatment plan will be reviewed in 90 days or when goals have been changed.   There has been demonstrated improvement in functioning while patient has been engaged in psychotherapy/psychological service- if withdrawn the patient would deteriorate and/or relapse.       MeasurableTreatment Goal(s) related to diagnosis / functional impairment(s)  Goal 1: Patient will \"jumpstart, getting going with the things I need to be doing around the house as far as picking up, doing things, trying to do something every day.  Also to lessen the animosity between me and my .\"    I will know I've met my goal when my shoulders are fixed and I can " "see.      Objective #A (Patient Action)    Patient will increase frequency of engaging her in ADLs.  Status: Continued - Date(s): 12/10/21, 3/9/22, 6/9/22, 9/2/22, 12/1/22    Intervention(s)  Therapist will engage patient in CBT, specifically behavioral activation.    Objective #B  Patient will  track and record at least 5 pleasant exchanges with . Patient will be able to identify at least 5 positive traits about her  and how he relates to her.  Status: Continued - Date(s): 12/10/21, 3/9/22, 6/9/22, 9/2/22, 12/1/22    Intervention(s)  Therapist will teach assertiveness skills and assign homework related to relationship interactions.    Objective #C  Patient will reduce level of depressive and anxious features as evidenced by reduction in score on her CHAVO-7 and PHQ-9 (scores of 15 and 16 at first measurment, respectively).  Status: Continued - Date(s): 12/10/21, 3/9/22, 6/9/22, 9/2/22, 12/1/22    Intervention(s)  Therapist will engage patient in person-centered therapy and CBT.    Patient has reviewed and agreed to the above plan.      MICAELA SLADE  December 1, 2022                                                   Randi Cleary          SAFETY PLAN:  Step 1: Warning signs / cues (Thoughts, images, mood, situation, behavior) that a crisis may be developing:  ? Thoughts: \"I don't want to continue\" \"I am unwanted\"  ? Images: none  ? Thinking Processes: ruminating  ? Mood: anger  ? Behaviors: isolating/withdrawing , can't stop crying, not taking care of myself and not taking care of my responsibilities  ? Situations: small triggers, such as not being able to find something, or dropping something   Step 2: Coping strategies - Things I can do to take my mind off of my problems without contacting another person (relaxation technique, physical activity):  ? Distress Tolerance Strategies:  arts and crafts: drawing, play with my pet , listen to positive and upbeat music: any, change body temperature " "(ice pack/cold water)  and paced breathing/progressive muscle relaxation  ? Physical Activities: go for a walk, deep breathing and stretching   ? Focus on helpful thoughts:  \"You've been through this before, you can get through it again.\"  Step 3: People and social settings that provide distraction:                 Name: Carmen                            Name: [place]                           Name: Aleida       ? pool, shopping, Carmen's house, [employer]       Step 4: Remind myself of people and things that are important to me and worth living for:  [place], Aleida Horton, post-COVID world, options of what could be in your future        Step 5: When I am in crisis, I can ask these people to help me use my safety plan:                 Name: Sidney  Step 6: Making the environment safe:   ? go to sleep/daydream  Step 7: Professionals or agencies I can contact during a crisis:  ? [facility] Daytime Number: [phone]  ? Suicide Prevention Lifeline: 6-105-398-TALK (9519)  ? Crisis Text Line Service (available 24 hours a day, 7 days a week): Text [place] to 562480    Local Crisis Services: [facility] Crisis: [phone]  Adults can always access to the emPATH unit at [facility] (no phone number, utilize it like an urgent care or ER where you just show up)     Call 911 or go to my nearest emergency department.       I helped develop this safety plan and agree to use it when needed.  I have been given a copy of this plan.       Client signature _________________________________________________________________  Today s date:  11/24/2020  Adapted from Safety Plan Template 2008 Randi Poole and Robby Barba is reprinted with the express permission of the authors.  No portion of the Safety Plan Template may be reproduced without the express, written permission.  You can contact the authors at [email or web address] or [email or web address].  "

## 2023-02-21 ENCOUNTER — TELEPHONE (OUTPATIENT)
Dept: BEHAVIORAL HEALTH | Facility: CLINIC | Age: 68
End: 2023-02-21
Payer: MEDICARE

## 2023-02-21 NOTE — TELEPHONE ENCOUNTER
Writer returned patient's call the program line. She has medicare and blue cross as a supplemental plan, she didn't pay her premium so they are not covering anything starting the first of the year. She is supposed to hear back from the grafter this week and they told her they will back-date medical bills. she will return to programming once she hears from them.    Ting Perez, [facility] on 2/21/2023 at 8:46 AM

## 2023-02-23 ENCOUNTER — VIRTUAL VISIT (OUTPATIENT)
Dept: PSYCHOLOGY | Facility: CLINIC | Age: 68
End: 2023-02-23
Payer: MEDICARE

## 2023-02-23 DIAGNOSIS — F31.81 BIPOLAR 2 DISORDER (H): Primary | ICD-10-CM

## 2023-02-23 DIAGNOSIS — F41.1 GENERALIZED ANXIETY DISORDER: ICD-10-CM

## 2023-02-23 DIAGNOSIS — F43.9 TRAUMA AND STRESSOR-RELATED DISORDER: ICD-10-CM

## 2023-02-23 PROCEDURE — 90834 PSYTX W PT 45 MINUTES: CPT | Mod: VID | Performed by: SOCIAL WORKER

## 2023-02-23 NOTE — PROGRESS NOTES
[facility]                                     Progress Note    Patient Name: Randi Cleary  Date: 2/23/23         Service Type: Individual     Session Start Time: 8:31 AM  Session End Time: 9:20 AM     Session Length: 49 minutes    Session #: 164    Attendees: Client attended alone    Service Modality:  Video Visit:      Provider verified identity through the following two step process.  Patient provided:  Patient is known previously to provider    Telemedicine Visit: The patient's condition can be safely assessed and treated via synchronous audio and visual telemedicine encounter.      Reason for Telemedicine Visit: Patient convenience (e.g. access to timely appointments / distance to available provider)    Originating Site (Patient Location): Patient's home    Distant Site (Provider Location): Provider Remote Setting- Home Office    Consent:  The patient/guardian has verbally consented to: the potential risks and benefits of telemedicine (video visit) versus in person care; bill my insurance or make self-payment for services provided; and responsibility for payment of non-covered services.     Patient would like the video invitation sent by:  My Chart    Mode of Communication:  [facility] Conference [facility]    Distant Location (Provider):  Off-site    As the provider I attest to compliance with applicable laws and regulations related to telemedicine.    DATA  Extended Session (53+ minutes): PROLONGED SERVICE IN THE OUTPATIENT SETTING REQUIRING DIRECT (FACE-TO-FACE) PATIENT CONTACT BEYOND THE USUAL SERVICE:    - High distress and under complex circumstances.  See Data section for details  Interactive Complexity: No  Crisis: No        Progress Since Last Session (Related to Symptoms / Goals / Homework):   Symptoms: Patient continues to feel stressed and overwhelmed    Homework: Progress made  Focus on day treatment  Look at a budget  Get back to the pool  Finish up taxes  Talk to your  primary care doctor about what's on your list (mood, neck, arthritis)     Episode of Care Goals: Satisfactory progress - ACTION (Actively working towards change); Intervened by reinforcing change plan / affirming steps taken     Current / Ongoing Stressors and Concerns:   Patient is currently socially isolated. She has a conflictual relationship with her .  She is getting minimal physical activity.  She had recent eye surgery and shoulder surgery.     Treatment Objective(s) Addressed in This Session:   Patient will increase frequency of engaging her in ADLs.  Patient will track and record at least 5 pleasant exchanges with . Patient will be able to identify at least 5 positive traits about her .  Patient will reduce level of depressive and anxious features as evidenced by reduction in score on her CHAVO-7 and PHQ-9 (scores of 15 and 16 at first measurment, respectively).     Intervention:   Supportive Therapy: Patient has stopped day treatment for now as she is concerned about her health insurance, but hopes to hear back today or tomorrow. She's also concerned as she doesn't know why she's having mental health symptoms as she can't find a physical cause for them. Talked about focusing on getting treatment and help despite the frustration of her not being able to find a physical underlying cause. Discussed her diagnoses. Reviewed homework and identified successes and barriers to completion. Set small goals for this next period of time.      The following assessments were completed by patient for this visit:  PHQ9:   PHQ-9 SCORE 1/5/2023 1/10/2023 1/11/2023 1/11/2023 1/30/2023 2/5/2023 2/14/2023   PHQ-9 Total Score [place] 16 (Moderately severe depression) 16 (Moderately severe depression) - 15 (Moderately severe depression) 13 (Moderate depression) 18 (Moderately severe depression) 18 (Moderately severe depression)   PHQ-9 Total Score 16 16 15 15 13 18 18   Some encounter information is  confidential and restricted. Go to Review Flowsheets activity to see all data.     GAD7:   CHAVO-7 SCORE 1/30/2023 2/5/2023 2/20/2023 2/20/2023 2/20/2023 2/20/2023 2/20/2023   Total Score 17 (severe anxiety) 16 (severe anxiety) - - - - 17 (severe anxiety)   Total Score 17 16 17 17 17 17 17   Some encounter information is confidential and restricted. Go to Review Flowsheets activity to see all data.     PROMIS 10-Global Health (only subscores and total score):   PROMIS-10 Scores Only 1/5/2023 1/5/2023 2/5/2023 2/5/2023 2/5/2023 2/5/2023 2/5/2023   Global Mental Health Score 5 5 5 5 5 5 5   Global Physical Health Score 8 8 8 8 8 8 8   PROMIS TOTAL - SUBSCORES 13 13 13 13 13 13 13   Some encounter information is confidential and restricted. Go to Review Flowsheets activity to see all data.        ASSESSMENT: Current Emotional / Mental Status (status of significant symptoms):   Risk status (Self / Other harm or suicidal ideation)   Patient denies current fears or concerns for personal safety.   Patient reports the following current or recent suicidal ideation or behaviors: increase in passive ideation over the weekend, felt like an intense sense of panic and hopelessness.   Patient denies current or recent homicidal ideation or behaviors.   Patient denies current or recent self injurious behavior or ideation.   Patient denies other safety concerns.   Patient reports there has been no change in risk factors since their last session.     Patient reports there has been no change in protective factors since their last session.     A safety and risk management plan has been developed including: Patient consented to co-developed safety plan on 11/24/2020, updated 2/20/23.  Safety and risk management plan was reviewed.   Patient agreed to use safety plan should any safety concerns arise.  A copy was made available to the patient.     Appearance:   Appropriate    Eye Contact:   Good    Psychomotor Behavior: Normal     Attitude:   Cooperative    Orientation:   All   Speech    Rate / Production: Normal/ Responsive    Volume:  Normal    Mood:    Normal   Affect:    Appropriate    Thought Content:  Clear    Thought Form:  Coherent    Insight:    Good      Medication Review:   No changes to current psychiatric medication(s)     Medication Compliance:   Yes     Changes in Health Issues:   None reported     Chemical Use Review:   Substance Use: Chemical use reviewed, no active concerns identified Nothing used since 2021.     Tobacco Use: No current tobacco use.      Diagnosis:  1. Bipolar 2 disorder (H)    2. Generalized anxiety disorder    3. Trauma and stressor-related disorder      Collateral Reports Completed:   Not Applicable    PLAN: (Patient Tasks / Therapist Tasks / Other)  Work on [place]  Get back to day treatment  Get your MRI    In the future:  Look at a budget  Get back to the pool  Talk to your primary care doctor about what's on your list (mood, neck, arthritis)        There has been demonstrated improvement in functioning while patient has been engaged in psychotherapy/psychological service- if withdrawn the patient would deteriorate and/or relapse.     MICAELA SLADE   2023                                                        ______________________________________________________________________    Individual Treatment Plan    Patient's Name: Randi Cleary  YOB: 1955    Date of Creation: 20  Date Treatment Plan Last Reviewed/Revised: 22    DSM5 Diagnoses: 296.89 Bipolar II Disorder Depressed, 300.02 (F41.1) Generalized Anxiety Disorder or Adjustment Disorders  309.89 (F43.8) Other Specified Trauma and Stressor Related Disorder  Psychosocial / Contextual Factors: Patient's entire family of origin has , she now has a sister-in-law and  as support.  Relationship with  is conflictual. She is recovering from surgeries  PROMIS (reviewed every 90 days):  "PROMIS-10 Scores  PROMIS 10-Global Health (only subscores and total score):   PROMIS-10 Scores Only 12/14/2021 3/15/2022 3/15/2022 3/15/2022 3/24/2022 4/1/2022 6/9/2022   Global Mental Health Score 6 6 6 6 8 12 12   Global Physical Health Score 9 9 9 9 8 11 10   PROMIS TOTAL - SUBSCORES 15 15 15 15 16 23 22       Referral / Collaboration:  Referral to another professional/service is not indicated at this time..    Anticipated number of session for this episode of care: 50  Anticipation frequency of session: Biweekly  Anticipated Duration of each session: 53 or more minutes due to intensity of trauma symptoms  Treatment plan will be reviewed in 90 days or when goals have been changed.   There has been demonstrated improvement in functioning while patient has been engaged in psychotherapy/psychological service- if withdrawn the patient would deteriorate and/or relapse.       MeasurableTreatment Goal(s) related to diagnosis / functional impairment(s)  Goal 1: Patient will \"jumpstart, getting going with the things I need to be doing around the house as far as picking up, doing things, trying to do something every day.  Also to lessen the animosity between me and my .\"    I will know I've met my goal when my shoulders are fixed and I can see.      Objective #A (Patient Action)    Patient will increase frequency of engaging her in ADLs.  Status: Continued - Date(s): 12/10/21, 3/9/22, 6/9/22, 9/2/22, 12/1/22    Intervention(s)  Therapist will engage patient in CBT, specifically behavioral activation.    Objective #B  Patient will  track and record at least 5 pleasant exchanges with . Patient will be able to identify at least 5 positive traits about her  and how he relates to her.  Status: Continued - Date(s): 12/10/21, 3/9/22, 6/9/22, 9/2/22, 12/1/22    Intervention(s)  Therapist will teach assertiveness skills and assign homework related to relationship interactions.    Objective #C  Patient will " "reduce level of depressive and anxious features as evidenced by reduction in score on her CHAVO-7 and PHQ-9 (scores of 15 and 16 at first measurment, respectively).  Status: Continued - Date(s): 12/10/21, 3/9/22, 6/9/22, 9/2/22, 12/1/22    Intervention(s)  Therapist will engage patient in person-centered therapy and CBT.    Patient has reviewed and agreed to the above plan.      MICAELA SLADE  December 1, 2022                                                   Randi Cleary          SAFETY PLAN:  Step 1: Warning signs / cues (Thoughts, images, mood, situation, behavior) that a crisis may be developing:  ? Thoughts: \"I don't want to continue\" \"I am unwanted\"  ? Images: none  ? Thinking Processes: ruminating  ? Mood: anger  ? Behaviors: isolating/withdrawing , can't stop crying, not taking care of myself and not taking care of my responsibilities  ? Situations: small triggers, such as not being able to find something, or dropping something   Step 2: Coping strategies - Things I can do to take my mind off of my problems without contacting another person (relaxation technique, physical activity):  ? Distress Tolerance Strategies:  arts and crafts: drawing, play with my pet , listen to positive and upbeat music: any, change body temperature (ice pack/cold water)  and paced breathing/progressive muscle relaxation  ? Physical Activities: go for a walk, deep breathing and stretching   ? Focus on helpful thoughts:  \"You've been through this before, you can get through it again.\"  Step 3: People and social settings that provide distraction:                 Name: Carmen                            Name: [place]                           Name: Aleida       ? pool, shopping, Carmen's house, [employer]       Step 4: Remind myself of people and things that are important to me and worth living for:  Sid[place], Aleida Horton, post-COVID world, options of what could be in your future        Step 5: When I am in crisis, I can ask these " people to help me use my safety plan:                 Name: Sidney  Step 6: Making the environment safe:   ? go to sleep/daydream  Step 7: Professionals or agencies I can contact during a crisis:  ? [facility] Number: [phone]  ? Suicide Prevention Lifeline: 1-310-872-TALK (8255)  ? Crisis Text Line Service (available 24 hours a day, 7 days a week): Text [place] to 378778    Local Crisis Services: [facility] Crisis: [phone]  Adults can always access to the emPATH unit at [facility] (no phone number, utilize it like an urgent care or ER where you just show up)     Call 911 or go to my nearest emergency department.       I helped develop this safety plan and agree to use it when needed.  I have been given a copy of this plan.       Client signature _________________________________________________________________  Today s date:  11/24/2020  Adapted from Safety Plan Template 2008 Randi Poole and Robby Barba is reprinted with the express permission of the authors.  No portion of the Safety Plan Template may be reproduced without the express, written permission.  You can contact the authors at [email or web address] or [email or web address].

## 2023-02-25 ENCOUNTER — HOSPITAL ENCOUNTER (OUTPATIENT)
Dept: MRI IMAGING | Facility: CLINIC | Age: 68
Discharge: HOME | End: 2023-02-25
Attending: INTERNAL MEDICINE | Admitting: INTERNAL MEDICINE
Payer: MEDICARE

## 2023-02-25 DIAGNOSIS — E27.9 ADRENAL DISEASE (H): ICD-10-CM

## 2023-02-25 PROCEDURE — 74181 MRI ABDOMEN W/O CONTRAST: CPT

## 2023-02-27 ENCOUNTER — VIRTUAL VISIT (OUTPATIENT)
Dept: PSYCHOLOGY | Facility: CLINIC | Age: 68
End: 2023-02-27
Payer: MEDICARE

## 2023-02-27 DIAGNOSIS — F31.81 BIPOLAR 2 DISORDER (H): Primary | ICD-10-CM

## 2023-02-27 DIAGNOSIS — F43.9 TRAUMA AND STRESSOR-RELATED DISORDER: ICD-10-CM

## 2023-02-27 DIAGNOSIS — F41.1 GENERALIZED ANXIETY DISORDER: ICD-10-CM

## 2023-02-27 PROCEDURE — 90837 PSYTX W PT 60 MINUTES: CPT | Mod: VID | Performed by: SOCIAL WORKER

## 2023-02-27 ASSESSMENT — PATIENT HEALTH QUESTIONNAIRE - PHQ9
SUM OF ALL RESPONSES TO PHQ QUESTIONS 1-9: 20
SUM OF ALL RESPONSES TO PHQ QUESTIONS 1-9: 20
10. IF YOU CHECKED OFF ANY PROBLEMS, HOW DIFFICULT HAVE THESE PROBLEMS MADE IT FOR YOU TO DO YOUR WORK, TAKE CARE OF THINGS AT HOME, OR GET ALONG WITH OTHER PEOPLE: EXTREMELY DIFFICULT

## 2023-02-27 NOTE — PROGRESS NOTES
[facility]                                     Progress Note    Patient Name: Randi Cleary  Date: 2/27/23         Service Type: Individual     Session Start Time: 11:30 AM  Session End Time: 12:25 PM     Session Length: 55 minutes    Session #: 165    Attendees: Client attended alone    Service Modality:  Video Visit:      Provider verified identity through the following two step process.  Patient provided:  Patient is known previously to provider    Telemedicine Visit: The patient's condition can be safely assessed and treated via synchronous audio and visual telemedicine encounter.      Reason for Telemedicine Visit: Patient convenience (e.g. access to timely appointments / distance to available provider)    Originating Site (Patient Location): Patient's home    Distant Site (Provider Location): Provider Remote Setting- Home Office    Consent:  The patient/guardian has verbally consented to: the potential risks and benefits of telemedicine (video visit) versus in person care; bill my insurance or make self-payment for services provided; and responsibility for payment of non-covered services.     Patient would like the video invitation sent by:  My Chart    Mode of Communication:  Video Conference via Amwell    Distant Location (Provider):  Off-site    As the provider I attest to compliance with applicable laws and regulations related to telemedicine.    DATA  Extended Session (53+ minutes): PROLONGED SERVICE IN THE OUTPATIENT SETTING REQUIRING DIRECT (FACE-TO-FACE) PATIENT CONTACT BEYOND THE USUAL SERVICE:    - High distress and under complex circumstances.  See Data section for details  Interactive Complexity: No  Crisis: No        Progress Since Last Session (Related to Symptoms / Goals / Homework):   Symptoms: Increase in rage, anger, and dysregulation per report    Homework: Progress made  Work on taxes  Get back to day treatment  Get your MRI    In the future:  Look at a  budget  Get back to the pool  Talk to your primary care doctor about what's on your list (mood, neck, arthritis)       Episode of Care Goals: Satisfactory progress - ACTION (Actively working towards change); Intervened by reinforcing change plan / affirming steps taken     Current / Ongoing Stressors and Concerns:   Patient is currently socially isolated. She has a conflictual relationship with her .  She is getting minimal physical activity.  She has had several surgeries.      Treatment Objective(s) Addressed in This Session:   Patient will increase frequency of engaging her in ADLs.  Patient will track and record at least 5 pleasant exchanges with . Patient will be able to identify at least 5 positive traits about her .  Patient will reduce level of depressive and anxious features as evidenced by reduction in score on her CHAVO-7 and PHQ-9 (scores of 15 and 16 at first measurment, respectively).     Intervention:   Supportive Therapy: Patient reported she went to her MRI over the weekend and became overwhelmed. Processed her concerns that no underlying concerns were found in the MRI as she expected, she is convinced there is something wrong and doesn't know where to turn for help. Talked about the impact of this and her next steps. Helped patient slow down and identify that she next wants to decide who to go to for a referral, including considering [facility]. Encouraged patient to continue day treatment to work on managing her mental health symptoms, even if she suspects there is an underlying physical cause as she still needs to cope with them while they are occurring. Discussed how patient is wanting to avoid being in public more and more often; talked about the importance of continuing to go out as avoiding lead to more and more avoiding.      The following assessments were completed by patient for this visit:  PHQ9:   PHQ-9 SCORE 1/10/2023 1/11/2023 1/11/2023 1/30/2023 2/5/2023 2/14/2023  2/27/2023   PHQ-9 Total Score [facility] 16 (Moderately severe depression) - 15 (Moderately severe depression) 13 (Moderate depression) 18 (Moderately severe depression) 18 (Moderately severe depression) 20 (Severe depression)   PHQ-9 Total Score 16 15 15 13 18 18 20   Some encounter information is confidential and restricted. Go to Review Flowsheets activity to see all data.     GAD7:   CHAVO-7 SCORE 1/30/2023 2/5/2023 2/20/2023 2/20/2023 2/20/2023 2/20/2023 2/20/2023   Total Score 17 (severe anxiety) 16 (severe anxiety) - - - - 17 (severe anxiety)   Total Score 17 16 17 17 17 17 17   Some encounter information is confidential and restricted. Go to Review Flowsheets activity to see all data.     PROMIS 10-Global Health (only subscores and total score):   PROMIS-10 Scores Only 1/5/2023 1/5/2023 2/5/2023 2/5/2023 2/5/2023 2/5/2023 2/5/2023   Global Mental Health Score 5 5 5 5 5 5 5   Global Physical Health Score 8 8 8 8 8 8 8   PROMIS TOTAL - SUBSCORES 13 13 13 13 13 13 13   Some encounter information is confidential and restricted. Go to Review Flowsheets activity to see all data.        ASSESSMENT: Current Emotional / Mental Status (status of significant symptoms):   Risk status (Self / Other harm or suicidal ideation)   Patient denies current fears or concerns for personal safety.   Patient reports the following current or recent suicidal ideation or behaviors: increase in passive ideation over the weekend, felt like an intense sense of panic and hopelessness.   Patient denies current or recent homicidal ideation or behaviors.   Patient denies current or recent self injurious behavior or ideation.   Patient denies other safety concerns.   Patient reports there has been no change in risk factors since their last session.     Patient reports there has been no change in protective factors since their last session.     A safety and risk management plan has been developed including: Patient consented to co-developed safety  plan on 11/24/2020, updated 2/20/23.  Safety and risk management plan was reviewed.   Patient agreed to use safety plan should any safety concerns arise.  A copy was made available to the patient.     Appearance:   Appropriate    Eye Contact:   Good    Psychomotor Behavior: Normal    Attitude:   Cooperative    Orientation:   All   Speech    Rate / Production: Normal/ Responsive    Volume:  Normal    Mood:    Normal   Affect:    Appropriate    Thought Content:  Clear    Thought Form:  Coherent    Insight:    Good      Medication Review:   No changes to current psychiatric medication(s)     Medication Compliance:   Yes     Changes in Health Issues:   None reported     Chemical Use Review:   Substance Use: Chemical use reviewed, no active concerns identified Nothing used since August 2021.     Tobacco Use: No current tobacco use.      Diagnosis:  1. Bipolar 2 disorder (H)    2. Generalized anxiety disorder    3. Trauma and stressor-related disorder      Collateral Reports Completed:   Not Applicable    PLAN: (Patient Tasks / Therapist Tasks / Other)  Decide next plan of action for your health  Get back to day treatment  Keep getting out in the community    In the future:  Look at a budget  Get back to the pool  Talk to your primary care doctor about what's on your list (mood, neck, arthritis)        There has been demonstrated improvement in functioning while patient has been engaged in psychotherapy/psychological service- if withdrawn the patient would deteriorate and/or relapse.     MICAELA SLADE   February 27, 2023                                                        ______________________________________________________________________    Individual Treatment Plan    Patient's Name: Randi Cleary  YOB: 1955    Date of Creation: 6/30/20  Date Treatment Plan Last Reviewed/Revised: 12/1/22    DSM5 Diagnoses: 296.89 Bipolar II Disorder Depressed, 300.02 (F41.1) Generalized Anxiety Disorder or  "Adjustment Disorders  309.89 (F43.8) Other Specified Trauma and Stressor Related Disorder  Psychosocial / Contextual Factors: Patient's entire family of origin has , she now has a sister-in-law and  as support.  Relationship with  is conflictual. She is recovering from surgeries  PROMIS (reviewed every 90 days): PROMIS-10 Scores  PROMIS 10-Global Health (only subscores and total score):   PROMIS-10 Scores Only 2021 3/15/2022 3/15/2022 3/15/2022 3/24/2022 2022 2022   Global Mental Health Score 6 6 6 6 8 12 12   Global Physical Health Score 9 9 9 9 8 11 10   PROMIS TOTAL - SUBSCORES 15 15 15 15 16 23 22       Referral / Collaboration:  Referral to another professional/service is not indicated at this time..    Anticipated number of session for this episode of care: 50  Anticipation frequency of session: Biweekly  Anticipated Duration of each session: 53 or more minutes due to intensity of trauma symptoms  Treatment plan will be reviewed in 90 days or when goals have been changed.   There has been demonstrated improvement in functioning while patient has been engaged in psychotherapy/psychological service- if withdrawn the patient would deteriorate and/or relapse.       MeasurableTreatment Goal(s) related to diagnosis / functional impairment(s)  Goal 1: Patient will \"jumpstart, getting going with the things I need to be doing around the house as far as picking up, doing things, trying to do something every day.  Also to lessen the animosity between me and my .\"    I will know I've met my goal when my shoulders are fixed and I can see.      Objective #A (Patient Action)    Patient will increase frequency of engaging her in ADLs.  Status: Continued - Date(s): 12/10/21, 3/9/22, 22, 22, 22    Intervention(s)  Therapist will engage patient in CBT, specifically behavioral activation.    Objective #B  Patient will  track and record at least 5 pleasant exchanges with . " "Patient will be able to identify at least 5 positive traits about her  and how he relates to her.  Status: Continued - Date(s): 12/10/21, 3/9/22, 6/9/22, 9/2/22, 12/1/22    Intervention(s)  Therapist will teach assertiveness skills and assign homework related to relationship interactions.    Objective #C  Patient will reduce level of depressive and anxious features as evidenced by reduction in score on her CHAVO-7 and PHQ-9 (scores of 15 and 16 at first measurment, respectively).  Status: Continued - Date(s): 12/10/21, 3/9/22, 6/9/22, 9/2/22, 12/1/22    Intervention(s)  Therapist will engage patient in person-centered therapy and CBT.    Patient has reviewed and agreed to the above plan.      MICAELA SLADE  December 1, 2022                                                   Randi Cleary          SAFETY PLAN:  Step 1: Warning signs / cues (Thoughts, images, mood, situation, behavior) that a crisis may be developing:  ? Thoughts: \"I don't want to continue\" \"I am unwanted\"  ? Images: none  ? Thinking Processes: ruminating  ? Mood: anger  ? Behaviors: isolating/withdrawing , can't stop crying, not taking care of myself and not taking care of my responsibilities  ? Situations: small triggers, such as not being able to find something, or dropping something   Step 2: Coping strategies - Things I can do to take my mind off of my problems without contacting another person (relaxation technique, physical activity):  ? Distress Tolerance Strategies:  arts and crafts: drawing, play with my pet , listen to positive and upbeat music: any, change body temperature (ice pack/cold water)  and paced breathing/progressive muscle relaxation  ? Physical Activities: go for a walk, deep breathing and stretching   ? Focus on helpful thoughts:  \"You've been through this before, you can get through it again.\"  Step 3: People and social settings that provide " distraction:                 Name: Carmen                            Name: Darien                           Name: Aleida       ? pool, shopping, Carmen's house, [employer]       Step 4: Remind myself of people and things that are important to me and worth living for:  Clifford Little Donna, post-COVID world, options of what could be in your future        Step 5: When I am in crisis, I can ask these people to help me use my safety plan:                 Name: Sidney  Step 6: Making the environment safe:   ? go to sleep/daydream  Step 7: Professionals or agencies I can contact during a crisis:  ? [facility] Daytime Number: [phone]  ? Suicide Prevention Lifeline: 3-143-045-UYBQ (0764)  ? Crisis Text Line Service (available 24 hours a day, 7 days a week): Text [place] to 952792    Local Crisis Services: [facility] Crisis: [phone]  Adults can always access to the emPATH unit at [facility] (no phone number, utilize it like an urgent care or ER where you just show up)     Call 911 or go to my nearest emergency department.       I helped develop this safety plan and agree to use it when needed.  I have been given a copy of this plan.       Client signature _________________________________________________________________  Today s date:  11/24/2020  Adapted from Safety Plan Template 2008 Randi Poole and Robby Barba is reprinted with the express permission of the authors.  No portion of the Safety Plan Template may be reproduced without the express, written permission.  You can contact the authors at [email or web address] or [email or web address].

## 2023-02-28 ENCOUNTER — HOSPITAL ENCOUNTER (OUTPATIENT)
Dept: BEHAVIORAL HEALTH | Facility: CLINIC | Age: 68
Discharge: HOME | End: 2023-02-28
Attending: PSYCHIATRY & NEUROLOGY
Payer: MEDICARE

## 2023-02-28 PROCEDURE — 90853 GROUP PSYCHOTHERAPY: CPT | Mod: PO | Performed by: SOCIAL WORKER

## 2023-02-28 PROCEDURE — 90853 GROUP PSYCHOTHERAPY: CPT | Mod: PO

## 2023-02-28 PROCEDURE — 90853 GROUP PSYCHOTHERAPY: CPT | Mod: GT | Performed by: SOCIAL WORKER

## 2023-02-28 NOTE — GROUP NOTE
Process Group Note    PATIENT'S NAME: Randi Cleary  MRN:   [id number]  :   1955  ACCT. NUMBER: [id number]  DATE OF SERVICE: 23  START TIME:  9:00 AM  END TIME:  9:50 AM  FACILITATOR: Brittani Molina [facility]  TOPIC:  Process Group    Diagnoses:   296.33 (F33.2) Major Depressive Disorder, Recurrent Episode, Severe With anxious distress  300.02 (F41.1) Generalized Anxiety Disorder    Rule out:  296.89 Bipolar II Disorder vs. 314.01 (F90.2) Attention-Deficit/Hyperactivity Disorder   Combined presentation         Service Modality:  Video Visit     Telemedicine Visit: The patient's condition can be safely assessed and treated via synchronous audio and visual telemedicine encounter.       Reason for Telemedicine Visit: Services only offered telehealth and due to COVID-19.     Originating Site (Patient Location): Patient's home     Distant Site (Provider Location): Provider Remote Setting- Home Office     Consent:  The patient/guardian has verbally consented to: the potential risks and benefits of telemedicine (video visit) versus in person care; bill my insurance or make self-payment for services provided; and responsibility for payment of non-covered services.      Patient would like the video invitation sent by:  My Chart     Mode of Communication:  Video Conference via Medical Zoom     As the provider I attest to compliance with applicable laws and regulations related to telemedicine.    [facility] 55+ Program  TRACK: A1    NUMBER OF PARTICIPANTS: 9          Data:    Session content: At the start of this group, patients were invited to check in by identifying themselves, describing their current emotional status, and identifying issues to address in this group.   Area(s) of treatment focus addressed in this session included Symptom Management, Personal Safety, Community Resources/Discharge Planning, Abstinence/Relapse Prevention, Develop / Improve Independent Living Skills and Develop  Socialization / Interpersonal Relationship Skills.    Winnie reports less depressed and less anxious mood. Denies S/I or safety issues. She reports having a good weekend. She feels calm. She is using coping skills for symptom management including breathing. Winnie reports having health insurance again including the supplemental. She is happy and grateful to be back in groups.      Therapeutic Interventions/Treatment Strategies:  Psychotherapist offered support, feedback and validation and reinforced use of skills. Treatment modalities used include Cognitive Behavioral Therapy.    Assessment:    Patient response:   Patient responded to session by accepting feedback, giving feedback, listening, focusing on goals, being attentive, accepting support and verbalizing understanding    Possible barriers to participation / learning include: and no barriers identified    Health Issues:   None reported. Reports medication compliance.       Substance Use Review:   Substance Use: No active concerns identified.    Mental Status/Behavioral Observations  Appearance:   Appropriate   Eye Contact:   Good   Psychomotor Behavior: Normal   Attitude:   Cooperative  Interested  Orientation:   All  Speech   Rate / Production: Normal    Volume:  Normal   Mood:    Normal Less depressed and less anxious mood  Affect:    Appropriate   Thought Content:   Clear and Safety denies any current safety concerns including suicidal ideation, self-harm, and homicidal ideation  Thought Form:  Coherent  Goal Directed  Logical     Insight:    Good     Plan:     Safety Plan: No current safety concerns identified.  Recommended that patient call 911 or go to the local ED should there be a change in any of these risk factors.     Barriers to treatment: None identified    Patient Contracts (see media tab):  None    Substance Use: Not addressed in session     Continue or Discharge: Patient will continue in 55+ Program (55+) as planned. Patient is likely to benefit  from learning and using skills as they work toward the goals identified in their treatment plan.  Winnie will continue for mood stabilization and symptom management education for mental health recovery.      Brittani Molina, [facility]  February 28, 2023

## 2023-02-28 NOTE — GROUP NOTE
Psychotherapy Group Note    PATIENT'S NAME: Randi Cleary  MRN:   [id number]  :   1955  ACCT. NUMBER: [id number]  DATE OF SERVICE: 23  START TIME: 10:00 AM  END TIME: 10:50 AM  FACILITATOR: Brittani Molina LICSW  TOPIC: MH EBP Group: Coping Skills  Service Modality:  Video Visit     Telemedicine Visit: The patient's condition can be safely assessed and treated via synchronous audio and visual telemedicine encounter.       Reason for Telemedicine Visit: Services only offered telehealth and due to COVID-19.     Originating Site (Patient Location): Patient's home     Distant Site (Provider Location): Provider Remote Setting- Home Office     Consent:  The patient/guardian has verbally consented to: the potential risks and benefits of telemedicine (video visit) versus in person care; bill my insurance or make self-payment for services provided; and responsibility for payment of non-covered services.      Patient would like the video invitation sent by:  My Chart     Mode of Communication:  Video Conference via Medical Zoom     As the provider I attest to compliance with applicable laws and regulations related to telemedicine.     [facility] 55+ Program  TRACK: A1    NUMBER OF PARTICIPANTS: 9    Summary of Group / Topics Discussed:  Coping Skills: Calming Strategies (Distress tolerance with TIP skill): Patients discussed and practiced strategies to increase sense of calm in the body.  Reviewed the benefits of calming strategies as well as past / current practices of each member.  Patients identified situations in which using these strategies would reduce stress. They developed the ability to distinguish when these strategies can be useful in their lives for management and stress and psychological well-being.    Patient Session Goals / Objectives:    Understand the purpose of using calming strategies to reduce stress    Review patients current calming practices and discuss a more formal way of  practicing and accessing skills.    Increase ability to decide when to use various calming strategies (e.g. Progressive muscle relaxation, deep breathing, guided imagery, + thoughts).    Choose 1-2 calming strategies to apply during times of distress.    Educated about the physiology of the autonomic nervous system.        Patient Participation / Response:  Fully participated with the group by sharing personal reflections / insights and openly received / provided feedback with other participants.    Demonstrated understanding of topics discussed through group discussion and participation    Treatment Plan:  Patient has a current master individualized treatment plan.  See Epic treatment plan for more information.    Brittani Molina, SUSANSW

## 2023-02-28 NOTE — GROUP NOTE
Psychotherapy Group Note    PATIENT'S NAME: Randi Cleary  MRN:   [id number]  :   1955  ACCT. NUMBER: [id number]  DATE OF SERVICE: 23  START TIME: 11:00 AM  END TIME: 11:50 AM  FACILITATOR: Luh Hare LGSW  TOPIC: MH EBP Group: Coping Skills  [facility] 55+ Program  TRACK: A1    NUMBER OF PARTICIPANTS: 9    Summary of Group / Topics Discussed:  Coping Skills: Relapse Planning: Patients discussed and increased understanding of how anticipating and planning for possible increased symptoms is a proactive way to reduce the likelihood of relapse.  Patients shared individualized factors that may lead to increased symptoms and decompensation in functioning.  Each patient developed a relapse prevention plan designed to help them recognize when they may have increasing symptoms requiring them to take action and notify their key supports and care team.      Patient Session Goals / Objectives:    Identify and understand what factors may contribute to symptom relapse.    Identify actions that may be taken to manage increased symptoms/stressors.    Create an individualized written relapse plan    Problem solve barriers to plan creation and implementation    Share relapse plan with key support people                                      Service Modality:  Video Visit     Telemedicine Visit: The patient's condition can be safely assessed and treated via synchronous audio and visual telemedicine encounter.      Reason for Telemedicine Visit: Services only offered telehealth    Originating Site (Patient Location): Patient's home    Distant Site (Provider Location): Provider Remote Setting- Home Office    Consent:  The patient/guardian has verbally consented to: the potential risks and benefits of telemedicine (video visit) versus in person care; bill my insurance or make self-payment for services provided; and responsibility for payment of non-covered services.     Patient would like the video invitation  sent by:  My Chart    Mode of Communication:  Video Conference via Medical Zoom    As the provider I attest to compliance with applicable laws and regulations related to telemedicine.                                 Patient Participation / Response:  Minimally participated, only when prompted / asked.    Demonstrated understanding of topics discussed through group discussion and participation    Treatment Plan:  Patient has a current master individualized treatment plan.  See Epic treatment plan for more information.    Luh Hare LGSW

## 2023-03-01 ENCOUNTER — HOSPITAL ENCOUNTER (OUTPATIENT)
Dept: BEHAVIORAL HEALTH | Facility: CLINIC | Age: 68
Discharge: HOME | End: 2023-03-01
Attending: PSYCHIATRY & NEUROLOGY
Payer: MEDICARE

## 2023-03-01 PROCEDURE — 90853 GROUP PSYCHOTHERAPY: CPT | Mod: GT

## 2023-03-01 PROCEDURE — 90853 GROUP PSYCHOTHERAPY: CPT | Mod: GT | Performed by: SOCIAL WORKER

## 2023-03-01 NOTE — GROUP NOTE
Process Group Note    PATIENT'S NAME: Randi Cleary  MRN:   [id number]  :   1955  ACCT. NUMBER: [id number]  DATE OF SERVICE: 3/01/23  START TIME:  9:00 AM  END TIME:  9:50 AM  FACILITATOR: Brittani Molina [facility]  TOPIC:  Process Group    Diagnoses:   296.33 (F33.2) Major Depressive Disorder, Recurrent Episode, Severe With anxious distress  300.02 (F41.1) Generalized Anxiety Disorder    Rule out:  296.89 Bipolar II Disorder vs. 314.01 (F90.2) Attention-Deficit/Hyperactivity Disorder   Combined presentation         Service Modality:  Video Visit     Telemedicine Visit: The patient's condition can be safely assessed and treated via synchronous audio and visual telemedicine encounter.       Reason for Telemedicine Visit: Services only offered telehealth and due to COVID-19.     Originating Site (Patient Location): Patient's home     Distant Site (Provider Location): Provider Remote Setting- Home Office     Consent:  The patient/guardian has verbally consented to: the potential risks and benefits of telemedicine (video visit) versus in person care; bill my insurance or make self-payment for services provided; and responsibility for payment of non-covered services.      Patient would like the video invitation sent by:  My Chart     Mode of Communication:  Video Conference via Medical Zoom     As the provider I attest to compliance with applicable laws and regulations related to telemedicine.    [facility] 55+ Program  TRACK: A1    NUMBER OF PARTICIPANTS: 8          Data:    Session content: At the start of this group, patients were invited to check in by identifying themselves, describing their current emotional status, and identifying issues to address in this group.   Area(s) of treatment focus addressed in this session included Symptom Management, Personal Safety, Community Resources/Discharge Planning, Abstinence/Relapse Prevention, Develop / Improve Independent Living Skills and Develop  "Socialization / Interpersonal Relationship Skills.    Winnie reports  depressed and anxious mood with panic. She reports having \"brain fog\" and is tired. Denies S/I or safety issues. She disclosed her Bipolar Disorder dx which was given one year ago. She is open to learning about her pattern of mood symptoms and what will be helpful coping skills for mental health recovery. She is focused on structuring her day with realistic tasks. She is aware of coping skills for symptom management. Her main support person is her . She is consulting with a Physical Therapist.      Therapeutic Interventions/Treatment Strategies:  Psychotherapist offered support, feedback and validation and reinforced use of skills. Treatment modalities used include Cognitive Behavioral Therapy.    Assessment:    Patient response:   Patient responded to session by accepting feedback, giving feedback, listening, focusing on goals, being attentive, accepting support and verbalizing understanding    Possible barriers to participation / learning include: and no barriers identified    Health Issues:   Yes: Consulting with  PT. Reports medication compliance.       Substance Use Review:   Substance Use: No active concerns identified.    Mental Status/Behavioral Observations  Appearance:   Appropriate   Eye Contact:   Good   Psychomotor Behavior: Normal   Attitude:   Cooperative  Interested  Orientation:   All  Speech   Rate / Production: Normal    Volume:  Normal   Mood:    Anxious  Depressed  Sad   Affect:    Appropriate  Tearful  Thought Content:   Clear and Safety denies any current safety concerns including suicidal ideation, self-harm, and homicidal ideation  Thought Form:  Coherent  Goal Directed  Logical     Insight:    Good     Plan:     Safety Plan: No current safety concerns identified.  Recommended that patient call 911 or go to the local ED should there be a change in any of these risk factors.     Barriers to treatment: None " identified    Patient Contracts (see media tab):  None    Substance Use: Not addressed in session     Continue or Discharge: Patient will continue in 55+ Program (55+) as planned. Patient is likely to benefit from learning and using skills as they work toward the goals identified in their treatment plan. Winnie will continue for mood stabilization and symptom management education for mental health recovery.       Brittani Molina, [facility]  March 1, 2023

## 2023-03-01 NOTE — GROUP NOTE
"Psychotherapy Group Note    PATIENT'S NAME: Randi Cleary  MRN:   [id number]  :   1955  ACCT. NUMBER: [id number]  DATE OF SERVICE: 3/01/23  START TIME: 11:00 AM  END TIME: 11:50 AM  FACILITATOR: Susie Webb LICSW  TOPIC: MH EBP Group: Coping Skills  [facility] 55+ Program  TRACK: A1                                    Service Modality:  Video Visit     Telemedicine Visit: The patient's condition can be safely assessed and treated via synchronous audio and visual telemedicine encounter.      Reason for Telemedicine Visit: Services only offered telehealth    Originating Site (Patient Location): Patient's home    Distant Site (Provider Location): [facility] Outpatient Setting: [facility]     Consent:  The patient/guardian has verbally consented to: the potential risks and benefits of telemedicine (video visit) versus in person care; bill my insurance or make self-payment for services provided; and responsibility for payment of non-covered services.     Patient would like the video invitation sent by:  My Chart    Mode of Communication:  Video Conference via Medical Zoom    As the provider I attest to compliance with applicable laws and regulations related to telemedicine.         NUMBER OF PARTICIPANTS: 8    Summary of Group / Topics Discussed:  Coping Skills: Additional Coping Skills:  Patients discussed and practiced the handout: \"Do Positive Experiences Stick to Your Ribs\".  This explains the benefits of noticing and appreciating the positives in your life. Reviewed the benefits of applying the aforementioned coping strategies.  Patients explored how these strategies might be applied to daily stressors or distressing situations.    Patient Session Goals / Objectives:    Understand the purpose and benefits of applying gratefulness to your life    Identify techniques for increasing positive thinking/gratitude    Address barriers to utilizing coping skills when in distress.        Patient " Participation / Response:  Fully participated with the group by sharing personal reflections / insights and openly received / provided feedback with other participants.    Demonstrated understanding of topics discussed through group discussion and participation, Expressed understanding of the relevance / importance of coping skills at distressing times in life and Demonstrated knowledge of when to consider using a variety of coping skills in daily life    Treatment Plan:  Patient has a current master individualized treatment plan.  See Epic treatment plan for more information.    Susie Townsend, LICSW

## 2023-03-01 NOTE — GROUP NOTE
Psychotherapy Group Note    PATIENT'S NAME: Randi Cleary  MRN:   [id number]  :   1955  ACCT. NUMBER: [id number]  DATE OF SERVICE: 3/01/23  START TIME: 10:00 AM  END TIME: 10:50 AM  FACILITATOR: Brittani Molina LICSW  TOPIC: MH EBP Group: Emotions Management  Service Modality:  Video Visit     Telemedicine Visit: The patient's condition can be safely assessed and treated via synchronous audio and visual telemedicine encounter.       Reason for Telemedicine Visit: Services only offered telehealth and due to COVID-19.     Originating Site (Patient Location): Patient's home     Distant Site (Provider Location): Provider Remote Setting- Home Office     Consent:  The patient/guardian has verbally consented to: the potential risks and benefits of telemedicine (video visit) versus in person care; bill my insurance or make self-payment for services provided; and responsibility for payment of non-covered services.      Patient would like the video invitation sent by:  My Chart     Mode of Communication:  Video Conference via Medical Zoom     As the provider I attest to compliance with applicable laws and regulations related to telemedicine.     [facility] 55+ Program  TRACK: A1    NUMBER OF PARTICIPANTS: 9    Summary of Group / Topics Discussed:  Emotions Management: Model of Emotions: Patients were introduced to the cyclical model of emotions.  Explored emotions are shaped by different events and one s interpretation of events.  Group discussed how emotions begin with an event, followed by one s interpretation, followed by associated feelings.  Discussion included a review of personal urges and actions that can/do follow an emotional experience in the patient s life, and the end results.    Patient Session Goals / Objectives:    Demonstrate understanding of types various emotions.    Identify and discuss specific emotions and when they occur; understand triggers.    Identify individual emotions and  physical sensations that accompany them.    Discuss urges and actions, and how to influence the intensity of emotional reactions and disrupt the cycle.      Discuss barriers to emotional regulation.    Choose 1-2 strategies to assist with emotional response to potentially distressing situations.      Patient Participation / Response:  Fully participated with the group by sharing personal reflections / insights and openly received / provided feedback with other participants.    Demonstrated understanding of topics discussed through group discussion and participation and Demonstrated understanding and practice strategies to manage difficult emotions and move towards healing    Treatment Plan:  Patient has a current master individualized treatment plan.  See Epic treatment plan for more information.    Brittani Molina, LICSW

## 2023-03-02 ENCOUNTER — VIRTUAL VISIT (OUTPATIENT)
Dept: PSYCHOLOGY | Facility: CLINIC | Age: 68
End: 2023-03-02
Payer: MEDICARE

## 2023-03-02 DIAGNOSIS — F31.81 BIPOLAR 2 DISORDER (H): Primary | ICD-10-CM

## 2023-03-02 DIAGNOSIS — F43.9 TRAUMA AND STRESSOR-RELATED DISORDER: ICD-10-CM

## 2023-03-02 DIAGNOSIS — F41.1 GENERALIZED ANXIETY DISORDER: ICD-10-CM

## 2023-03-02 PROCEDURE — 90837 PSYTX W PT 60 MINUTES: CPT | Mod: VID | Performed by: SOCIAL WORKER

## 2023-03-02 NOTE — PROGRESS NOTES
[facility]                                     Progress Note    Patient Name: Randi Cleary  Date: 3/2/23         Service Type: Individual     Session Start Time: 11:34 AM  Session End Time: 12:29 PM     Session Length: 55 minutes    Session #: 166    Attendees: Client attended alone    Service Modality:  Video Visit:      Provider verified identity through the following two step process.  Patient provided:  Patient is known previously to provider    Telemedicine Visit: The patient's condition can be safely assessed and treated via synchronous audio and visual telemedicine encounter.      Reason for Telemedicine Visit: Patient convenience (e.g. access to timely appointments / distance to available provider)    Originating Site (Patient Location): Patient's home    Distant Site (Provider Location): Provider Remote Setting- Home Office    Consent:  The patient/guardian has verbally consented to: the potential risks and benefits of telemedicine (video visit) versus in person care; bill my insurance or make self-payment for services provided; and responsibility for payment of non-covered services.     Patient would like the video invitation sent by:  My Chart    Mode of Communication:  [facility]    Distant Location (Provider):  Off-site    As the provider I attest to compliance with applicable laws and regulations related to telemedicine.    DATA  Extended Session (53+ minutes): PROLONGED SERVICE IN THE OUTPATIENT SETTING REQUIRING DIRECT (FACE-TO-FACE) PATIENT CONTACT BEYOND THE USUAL SERVICE:    - High distress and under complex circumstances.  See Data section for details  Interactive Complexity: No  Crisis: No        Progress Since Last Session (Related to Symptoms / Goals / Homework):   Symptoms: Ongoing sadness    Homework: Progress made  Decide next plan of action for your health -not yet, but closer  Get back to day treatment -done  Keep getting out in the community  -done    In the future:  Look at a budget  Get back to the pool  Talk to your primary care doctor about what's on your list (mood, neck, arthritis)       Episode of Care Goals: Satisfactory progress - ACTION (Actively working towards change); Intervened by reinforcing change plan / affirming steps taken     Current / Ongoing Stressors and Concerns:   Patient is currently socially isolated. She has a conflictual relationship with her .  She is getting minimal physical activity.  She has had several surgeries.      Treatment Objective(s) Addressed in This Session:   Patient will increase frequency of engaging her in ADLs.  Patient will track and record at least 5 pleasant exchanges with . Patient will be able to identify at least 5 positive traits about her .  Patient will reduce level of depressive and anxious features as evidenced by reduction in score on her CHAVO-7 and PHQ-9 (scores of 15 and 16 at first measurment, respectively).     Intervention:   Supportive Therapy: Processed patient's concerns about group. Patient has identified that when she focuses on her health she gets panic attacks. Provided support around this. Patient has a follow up appointment with psychiatry through group and plans to discuss her health concerns with him.     Internal Family Systems: Processed past traumatic loss of her father, noticing physical sensations in her body that went with this. Also processed another childhood memory. Made connections to current functioning and agreed to continue this work.      The following assessments were completed by patient for this visit:  PHQ9:   PHQ-9 SCORE 1/10/2023 1/11/2023 1/11/2023 1/30/2023 2/5/2023 2/14/2023 2/27/2023   PHQ-9 Total Score MyChart 16 (Moderately severe depression) - 15 (Moderately severe depression) 13 (Moderate depression) 18 (Moderately severe depression) 18 (Moderately severe depression) 20 (Severe depression)   PHQ-9 Total Score 16 15 15 13 18 18 20    Some encounter information is confidential and restricted. Go to Review Flowsheets activity to see all data.     GAD7:   CHAVO-7 SCORE 1/30/2023 2/5/2023 2/20/2023 2/20/2023 2/20/2023 2/20/2023 2/20/2023   Total Score 17 (severe anxiety) 16 (severe anxiety) - - - - 17 (severe anxiety)   Total Score 17 16 17 17 17 17 17   Some encounter information is confidential and restricted. Go to Review Flowsheets activity to see all data.     PROMIS 10-Global Health (only subscores and total score):   PROMIS-10 Scores Only 1/5/2023 1/5/2023 2/5/2023 2/5/2023 2/5/2023 2/5/2023 2/5/2023   Global Mental Health Score 5 5 5 5 5 5 5   Global Physical Health Score 8 8 8 8 8 8 8   PROMIS TOTAL - SUBSCORES 13 13 13 13 13 13 13   Some encounter information is confidential and restricted. Go to Review Flowsheets activity to see all data.        ASSESSMENT: Current Emotional / Mental Status (status of significant symptoms):   Risk status (Self / Other harm or suicidal ideation)   Patient denies current fears or concerns for personal safety.   Patient reports the following current or recent suicidal ideation or behaviors: increase in passive ideation over the weekend, felt like an intense sense of panic and hopelessness.   Patient denies current or recent homicidal ideation or behaviors.   Patient denies current or recent self injurious behavior or ideation.   Patient denies other safety concerns.   Patient reports there has been no change in risk factors since their last session.     Patient reports there has been no change in protective factors since their last session.     A safety and risk management plan has been developed including: Patient consented to co-developed safety plan on 11/24/2020, updated 2/20/23.  Safety and risk management plan was reviewed.   Patient agreed to use safety plan should any safety concerns arise.  A copy was made available to the patient.     Appearance:   Appropriate    Eye Contact:   Good    Psychomotor  Behavior: Normal    Attitude:   Cooperative    Orientation:   All   Speech    Rate / Production: Normal/ Responsive    Volume:  Normal    Mood:    Normal   Affect:    Appropriate    Thought Content:  Clear    Thought Form:  Coherent    Insight:    Good      Medication Review:   No changes to current psychiatric medication(s)     Medication Compliance:   Yes     Changes in Health Issues:   None reported     Chemical Use Review:   Substance Use: Chemical use reviewed, no active concerns identified Nothing used since 2021.     Tobacco Use: No current tobacco use.      Diagnosis:  1. Bipolar 2 disorder (H)    2. Generalized anxiety disorder    3. Trauma and stressor-related disorder      Collateral Reports Completed:   Not Applicable    PLAN: (Patient Tasks / Therapist Tasks / Other)  Next session talk about family and continue with childhood memories    Decide next plan of action for your health  Keep getting out in the community    In the future:  Look at a budget  Get back to the pool  Talk to your primary care doctor about what's on your list (mood, neck, arthritis)        There has been demonstrated improvement in functioning while patient has been engaged in psychotherapy/psychological service- if withdrawn the patient would deteriorate and/or relapse.     MICAELA SLADE   2023                                                        ______________________________________________________________________    Individual Treatment Plan    Patient's Name: Randi Cleary  YOB: 1955    Date of Creation: 20  Date Treatment Plan Last Reviewed/Revised: 3/2/23    DSM5 Diagnoses: 296.89 Bipolar II Disorder Depressed, 300.02 (F41.1) Generalized Anxiety Disorder or Adjustment Disorders  309.89 (F43.8) Other Specified Trauma and Stressor Related Disorder  Psychosocial / Contextual Factors: Patient's entire family of origin has , she now has a sister-in-law and  as support.  " Relationship with  is conflictual. She is recovering from surgeries  PROMIS (reviewed every 90 days): PROMIS-10 Scores  PROMIS 10-Global Health (only subscores and total score):   PROMIS-10 Scores Only 12/14/2021 3/15/2022 3/15/2022 3/15/2022 3/24/2022 4/1/2022 6/9/2022   Global Mental Health Score 6 6 6 6 8 12 12   Global Physical Health Score 9 9 9 9 8 11 10   PROMIS TOTAL - SUBSCORES 15 15 15 15 16 23 22       Referral / Collaboration:  Referral to another professional/service is not indicated at this time..    Anticipated number of session for this episode of care: 50  Anticipation frequency of session: Biweekly  Anticipated Duration of each session: 53 or more minutes due to intensity of trauma symptoms  Treatment plan will be reviewed in 90 days or when goals have been changed.   There has been demonstrated improvement in functioning while patient has been engaged in psychotherapy/psychological service- if withdrawn the patient would deteriorate and/or relapse.       MeasurableTreatment Goal(s) related to diagnosis / functional impairment(s)  Goal 1: Patient will \"jumpstart, getting going with the things I need to be doing around the house as far as picking up, doing things, trying to do something every day.  Also to lessen the animosity between me and my .\"    I will know I've met my goal when my shoulders are fixed and I can see.      Objective #A (Patient Action)    Patient will increase frequency of engaging her in ADLs.  Status: Continued - Date(s): 12/10/21, 3/9/22, 6/9/22, 9/2/22, 12/1/22, 3/2/23    Intervention(s)  Therapist will engage patient in CBT, specifically behavioral activation.    Objective #B  Patient will  track and record at least 5 pleasant exchanges with . Patient will be able to identify at least 5 positive traits about her  and how he relates to her.  Status: Continued - Date(s): 12/10/21, 3/9/22, 6/9/22, 9/2/22, 12/1/22, " "3/2/23    Intervention(s)  Therapist will teach assertiveness skills and assign homework related to relationship interactions.    Objective #C  Patient will reduce level of depressive and anxious features as evidenced by reduction in score on her CHAVO-7 and PHQ-9 (scores of 15 and 16 at first measurment, respectively).  Status: Continued - Date(s): 12/10/21, 3/9/22, 6/9/22, 9/2/22, 12/1/22, 3/2/23    Intervention(s)  Therapist will engage patient in person-centered therapy and CBT.    Patient has reviewed and agreed to the above plan.      MICAELA SLADE  March 2, 2023                                                   Randi Cleary          SAFETY PLAN:  Step 1: Warning signs / cues (Thoughts, images, mood, situation, behavior) that a crisis may be developing:  ? Thoughts: \"I don't want to continue\" \"I am unwanted\"  ? Images: none  ? Thinking Processes: ruminating  ? Mood: anger  ? Behaviors: isolating/withdrawing , can't stop crying, not taking care of myself and not taking care of my responsibilities  ? Situations: small triggers, such as not being able to find something, or dropping something   Step 2: Coping strategies - Things I can do to take my mind off of my problems without contacting another person (relaxation technique, physical activity):  ? Distress Tolerance Strategies:  arts and crafts: drawing, play with my pet , listen to positive and upbeat music: any, change body temperature (ice pack/cold water)  and paced breathing/progressive muscle relaxation  ? Physical Activities: go for a walk, deep breathing and stretching   ? Focus on helpful thoughts:  \"You've been through this before, you can get through it again.\"  Step 3: People and social settings that provide distraction:                 Name: Carmen                            Name: Darien                           Name: Aleida       ? pool, shopping, Carmen's house, [employer]       Step 4: Remind myself of people and things that are important to me " and worth living for:  Clifford Little Donna, post-COVID world, options of what could be in your future        Step 5: When I am in crisis, I can ask these people to help me use my safety plan:                 Name: Sidney  Step 6: Making the environment safe:   ? go to sleep/daydream  Step 7: Professionals or agencies I can contact during a crisis:  ? [facility] Daytime Number: [phone]  ? Suicide Prevention Lifeline: 9-714-311-NCGP (2833)  ? Crisis Text Line Service (available 24 hours a day, 7 days a week): Text [place] to 338657    Local Crisis Services: [facility] Crisis: [phone]  Adults can always access to the emPATH unit at [facility] (no phone number, utilize it like an urgent care or ER where you just show up)     Call 911 or go to my nearest emergency department.       I helped develop this safety plan and agree to use it when needed.  I have been given a copy of this plan.       Client signature _________________________________________________________________  Today s date:  11/24/2020  Adapted from Safety Plan Template 2008 Randi Poole and Robby Barba is reprinted with the express permission of the authors.  No portion of the Safety Plan Template may be reproduced without the express, written permission.  You can contact the authors at [email or web address] or [email or web address].

## 2023-03-03 ENCOUNTER — TELEPHONE (OUTPATIENT)
Dept: BEHAVIORAL HEALTH | Facility: CLINIC | Age: 68
End: 2023-03-03
Payer: MEDICARE

## 2023-03-06 ENCOUNTER — MYC MEDICAL ADVICE (OUTPATIENT)
Dept: INTERNAL MEDICINE | Facility: CLINIC | Age: 68
End: 2023-03-06
Payer: MEDICARE

## 2023-03-07 ENCOUNTER — HOSPITAL ENCOUNTER (OUTPATIENT)
Dept: BEHAVIORAL HEALTH | Facility: CLINIC | Age: 68
Discharge: HOME | End: 2023-03-07
Attending: PSYCHIATRY & NEUROLOGY
Payer: MEDICARE

## 2023-03-07 PROCEDURE — H0035 MH PARTIAL HOSP TX UNDER 24H: HCPCS | Mod: GT | Performed by: COUNSELOR

## 2023-03-07 PROCEDURE — 90853 GROUP PSYCHOTHERAPY: CPT | Mod: GT | Performed by: COUNSELOR

## 2023-03-07 PROCEDURE — 90853 GROUP PSYCHOTHERAPY: CPT | Mod: GT | Performed by: SOCIAL WORKER

## 2023-03-07 NOTE — GROUP NOTE
Process Group Note    PATIENT'S NAME: Randi Cleary  MRN:   [id number]  :   1955  ACCT. NUMBER: [id number]  DATE OF SERVICE: 3/07/23  START TIME:  9:00 AM  END TIME:  9:50 AM  FACILITATOR: Brittani Molina [facility]  TOPIC:  Process Group    Diagnoses:   296.33 (F33.2) Major Depressive Disorder, Recurrent Episode, Severe With anxious distress  300.02 (F41.1) Generalized Anxiety Disorder    Rule out:  296.89 Bipolar II Disorder vs. 314.01 (F90.2) Attention-Deficit/Hyperactivity Disorder   Combined presentation         Service Modality:  Video Visit     Telemedicine Visit: The patient's condition can be safely assessed and treated via synchronous audio and visual telemedicine encounter.       Reason for Telemedicine Visit: Services only offered telehealth and due to COVID-19.     Originating Site (Patient Location): Patient's home     Distant Site (Provider Location): Provider Remote Setting- Home Office     Consent:  The patient/guardian has verbally consented to: the potential risks and benefits of telemedicine (video visit) versus in person care; bill my insurance or make self-payment for services provided; and responsibility for payment of non-covered services.      Patient would like the video invitation sent by:  My Chart     Mode of Communication:  Video Conference via Medical Zoom     As the provider I attest to compliance with applicable laws and regulations related to telemedicine.    [facility] 55+ Program  TRACK: A1    NUMBER OF PARTICIPANTS: 6          Data:    Session content: At the start of this group, patients were invited to check in by identifying themselves, describing their current emotional status, and identifying issues to address in this group.   Area(s) of treatment focus addressed in this session included Symptom Management, Personal Safety, Community Resources/Discharge Planning, Abstinence/Relapse Prevention, Develop / Improve Independent Living Skills and Develop  Socialization / Interpersonal Relationship Skills.    Winnie reports anxious mood with worry. Denies S/I or safety issues. She is using coping skills for symptom management including breathing and practicing mindfulness. She prefers to be home with her . She reported on having a long time medical provider who recently retired. Winnie will exercise by swimming tomorrow.       Therapeutic Interventions/Treatment Strategies:  Psychotherapist offered support, feedback and validation and reinforced use of skills. Treatment modalities used include Cognitive Behavioral Therapy.    Assessment:    Patient response:   Patient responded to session by accepting feedback, giving feedback, listening, focusing on goals and being attentive    Possible barriers to participation / learning include: and no barriers identified    Health Issues:   None reported. Headache resolved. Medication compliance.       Substance Use Review:   Substance Use: No active concerns identified.    Mental Status/Behavioral Observations  Appearance:   Appropriate   Eye Contact:   Good   Psychomotor Behavior: Normal   Attitude:   Cooperative  Interested  Orientation:   All  Speech   Rate / Production: Normal    Volume:  Normal   Mood:    Anxious   Affect:    Appropriate   Thought Content:   Clear and Safety denies any current safety concerns including suicidal ideation, self-harm, and homicidal ideation  Thought Form:  Coherent  Goal Directed  Logical     Insight:    Good     Plan:     Safety Plan: No current safety concerns identified.  Recommended that patient call 911 or go to the local ED should there be a change in any of these risk factors.     Barriers to treatment: None identified    Patient Contracts (see media tab):  None    Substance Use: Not addressed in session     Continue or Discharge: Patient will continue in 55+ Program (55+) as planned. Patient is likely to benefit from learning and using skills as they work toward the goals identified  in their treatment plan. Winnie will continue for mood stabilization and symptom management education for mental health recovery.      Brittani Molina, [facility]  March 7, 2023

## 2023-03-07 NOTE — GROUP NOTE
Psychotherapy Group Note    PATIENT'S NAME: Randi Cleary  MRN:   [id number]  :   1955  ACCT. NUMBER: [id number]  DATE OF SERVICE: 3/07/23  START TIME: 10:00 AM  END TIME: 10:50 AM  FACILITATOR: Brittani Molina LICSW  TOPIC: MH EBP Group: Symptom Awareness  Service Modality:  Video Visit     Telemedicine Visit: The patient's condition can be safely assessed and treated via synchronous audio and visual telemedicine encounter.       Reason for Telemedicine Visit: Services only offered telehealth and due to COVID-19.     Originating Site (Patient Location): Patient's home     Distant Site (Provider Location): Provider Remote Setting- Home Office     Consent:  The patient/guardian has verbally consented to: the potential risks and benefits of telemedicine (video visit) versus in person care; bill my insurance or make self-payment for services provided; and responsibility for payment of non-covered services.      Patient would like the video invitation sent by:  My Chart     Mode of Communication:  Video Conference via Medical Zoom     As the provider I attest to compliance with applicable laws and regulations related to telemedicine.     [employer] [facility] 55+ Program  TRACK: A1    NUMBER OF PARTICIPANTS: 6    Summary of Group / Topics Discussed:  Symptom Awareness: Mood Disorders: Patients received a general overview of mood disorders including depressive disorders, anxiety disorders, and bipolar disorders and how it relates to their current symptoms. The purpose is to promote understanding of their diagnoses and how it impacts their functioning. Patients reviewed their current awareness of symptoms and diagnoses. Patients received information regarding diagnoses, etiology, cultural, and environmental factors as well as impact on functioning.     Patient Session Goals / Objectives:    Discussed patient individual symptoms and experiences    Reviewed etiology of diagnoses     Education through Sean talk  "video, \"This could be why your depressed\" With Bobo Hubbard and discussion.      Patient Participation / Response:  Fully participated with the group by sharing personal reflections / insights and openly received / provided feedback with other participants.    Demonstrated understanding of topics discussed through group discussion and participation    Treatment Plan:  Patient has a current master individualized treatment plan.  See Epic treatment plan for more information.    Brittani Molina, [facility]    "

## 2023-03-07 NOTE — GROUP NOTE
Psychotherapy Group Note    PATIENT'S NAME: Randi Cleary  MRN:   [id number]  :   1955  ACCT. NUMBER: [id number]  DATE OF SERVICE: 3/07/23  START TIME: 11:00 AM  END TIME: 11:50 AM  FACILITATOR: Ciara Martinez LPCC  TOPIC: MH EBP Group: Coping Skills  [facility] Day Treatment  TRACK: a! 55+    NUMBER OF PARTICIPANTS: 6    Summary of Group / Topics Discussed:  Coping Skills: Radical Acceptance: Patients learned radical acceptance as a way to tolerate heightened stress in the moment.  Patients identified situations that necessitate radical acceptance.  They focused on applying acceptance of the moment to tolerate difficult emotions and events. Patients discussed how to distinguish when this can be useful in their lives and when other skills are more relevant or helpful.    Patient Session Goals / Objectives:    Understand that some amount of pain exists for each of us in our lives    Process the difficulty of acceptance in our lives and benefits of radical acceptance to mood and functioning.    Demonstrate understanding of when to use the radical acceptance to tolerate distress by providing examples of situations where this could be applied.    Identify barriers to applying radical acceptance to difficult situations and explore strategies to overcome them                                    Service Modality:  Video Visit     Telemedicine Visit: The patient's condition can be safely assessed and treated via synchronous audio and visual telemedicine encounter.      Reason for Telemedicine Visit: Services only offered telehealth    Originating Site (Patient Location): Patient's home    Distant Site (Provider Location): Provider Remote Setting- Home Office    Consent:  The patient/guardian has verbally consented to: the potential risks and benefits of telemedicine (video visit) versus in person care; bill my insurance or make self-payment for services provided; and  responsibility for payment of non-covered services.     Patient would like the video invitation sent by:  My Chart    Mode of Communication:  Video Conference via Medical Zoom    As the provider I attest to compliance with applicable laws and regulations related to telemedicine.                                 Patient Participation / Response:  Fully participated with the group by sharing personal reflections / insights and openly received / provided feedback with other participants.    Demonstrated understanding of topics discussed through group discussion and participation, Expressed understanding of the relevance / importance of coping skills at distressing times in life, Demonstrated knowledge of when to consider using a variety of coping skills in daily life, Practiced 2-3 new coping skills in session and Identified / Expressed personal readiness to practice new coping skills    Treatment Plan:  Patient has a current master individualized treatment plan.  See Epic treatment plan for more information.    Ciara Martinez LPCC

## 2023-03-08 ENCOUNTER — HOSPITAL ENCOUNTER (OUTPATIENT)
Dept: BEHAVIORAL HEALTH | Facility: CLINIC | Age: 68
Discharge: HOME | End: 2023-03-08
Attending: PSYCHIATRY & NEUROLOGY
Payer: MEDICARE

## 2023-03-08 DIAGNOSIS — R06.09 DOE (DYSPNEA ON EXERTION): ICD-10-CM

## 2023-03-08 DIAGNOSIS — I27.20 PULMONARY HTN (H): Primary | ICD-10-CM

## 2023-03-08 PROCEDURE — 90853 GROUP PSYCHOTHERAPY: CPT | Mod: GT,PO | Performed by: SOCIAL WORKER

## 2023-03-08 PROCEDURE — 90853 GROUP PSYCHOTHERAPY: CPT | Mod: GT

## 2023-03-08 NOTE — GROUP NOTE
Psychotherapy Group Note    PATIENT'S NAME: Randi Cleary  MRN:   [id number]  :   1955  ACCT. NUMBER: [id number]  DATE OF SERVICE: 3/08/23  START TIME: 10:00 AM  END TIME: 10:50 AM  FACILITATOR: Brittani Molina LICSW  TOPIC: MH EBP Group: Coping Skills  Service Modality:  Video Visit     Telemedicine Visit: The patient's condition can be safely assessed and treated via synchronous audio and visual telemedicine encounter.       Reason for Telemedicine Visit: Services only offered telehealth and due to COVID-19.     Originating Site (Patient Location): Patient's home     Distant Site (Provider Location): Provider Remote Setting- Home Office     Consent:  The patient/guardian has verbally consented to: the potential risks and benefits of telemedicine (video visit) versus in person care; bill my insurance or make self-payment for services provided; and responsibility for payment of non-covered services.      Patient would like the video invitation sent by:  My Chart     Mode of Communication:  Video Conference via Medical Zoom     As the provider I attest to compliance with applicable laws and regulations related to telemedicine.     [facility] 55+ Program  TRACK: A1    NUMBER OF PARTICIPANTS: 7    SuSummary of Group / Topics Discussed:  Coping Skills: Improve the Moment by using Humor: Patients learned how to apply humor as a strategies to effect positive change in the present moment as well as a symptom management tool.  Patients will identified specific ways to improve the moment and reduce distress.     Patient Session Goals / Objectives:    Discuss how the use of intentional  in the moment  actions can help reduce distress.    Review patients current practices and discuss a more formal way of practicing and accessing skills.    Increase ability to decide when to use improve the moment strategies    Choose 1-2 in the moment actions to apply during times of distress.    Educated about the  biological benefits when using humor and laughter as a coping tool.      Patient Participation / Response:  Fully participated with the group by sharing personal reflections / insights and openly received / provided feedback with other participants.    Demonstrated understanding of topics discussed through group discussion and participation and Expressed understanding of the relevance / importance of coping skills at distressing times in life    Treatment Plan:  Patient has a current master individualized treatment plan.  See Epic treatment plan for more information.    Brittani Molina, LICSW

## 2023-03-08 NOTE — GROUP NOTE
Process Group Note    PATIENT'S NAME: Randi Cleary  MRN:   [id number]  :   1955  ACCT. NUMBER: [id number]  DATE OF SERVICE: 3/08/23  START TIME:  9:00 AM  END TIME:  9:50 AM  FACILITATOR: Brittani Molina [facility]  TOPIC:  Process Group    Diagnoses:   296.33 (F33.2) Major Depressive Disorder, Recurrent Episode, Severe With anxious distress  300.02 (F41.1) Generalized Anxiety Disorder    Rule out:  296.89 Bipolar II Disorder vs. 314.01 (F90.2) Attention-Deficit/Hyperactivity Disorder   Combined presentation         Service Modality:  Video Visit     Telemedicine Visit: The patient's condition can be safely assessed and treated via synchronous audio and visual telemedicine encounter.       Reason for Telemedicine Visit: Services only offered telehealth and due to COVID-19.     Originating Site (Patient Location): Patient's home     Distant Site (Provider Location): Provider Remote Setting- Home Office     Consent:  The patient/guardian has verbally consented to: the potential risks and benefits of telemedicine (video visit) versus in person care; bill my insurance or make self-payment for services provided; and responsibility for payment of non-covered services.      Patient would like the video invitation sent by:  My Chart     Mode of Communication:  Video Conference via Medical Zoom     As the provider I attest to compliance with applicable laws and regulations related to telemedicine.    [facility] 55+ Program  TRACK: A1    NUMBER OF PARTICIPANTS: 7          Data:    Session content: At the start of this group, patients were invited to check in by identifying themselves, describing their current emotional status, and identifying issues to address in this group.   Area(s) of treatment focus addressed in this session included Symptom Management, Personal Safety, Community Resources/Discharge Planning, Abstinence/Relapse Prevention, Develop / Improve Independent Living Skills, Develop  Socialization / Interpersonal Relationship Skills and Physical Health .    Winnie reports anxious and depressed mood with co-morbid physical health issues and pain. Denies S/I or safety issues. She processed her feelings of fear due to awaiting lab work results. Her MRI came back negative. She also reported an increase in pain level and is aligned with a pain management clinic. They prescribed medicinal  marijuana. Winnie voiced her frustration having to wear hand braces. She will follow up with her MD. She also is awaiting a Endocrinolgist appointment.   She is aware of coping skills for symptom management. She resides in a home that use to be a farm house with her  who is a musician.     Therapeutic Interventions/Treatment Strategies:  Psychotherapist offered support, feedback and validation and reinforced use of skills. Treatment modalities used include Cognitive Behavioral Therapy.    Assessment:    Patient response:   Patient responded to session by accepting feedback, giving feedback, listening, focusing on goals, being attentive and accepting support    Possible barriers to participation / learning include: and no barriers identified    Health Issues:   Yes: Pain, Associated Psychological Distress  Chronic disease management, Associated Psychological Distress Hx of COPD.        Reports medication compliance.    Substance Use Review:   Substance Use: No active concerns identified.    Mental Status/Behavioral Observations  Appearance:   Appropriate   Eye Contact:   Good   Psychomotor Behavior: Normal   Attitude:   Cooperative  Interested Pleasant  Orientation:   All  Speech   Rate / Production: Normal    Volume:  Normal   Mood:    Anxious  Depressed   Affect:    Appropriate  Worrisome   Thought Content:   Clear and Safety denies any current safety concerns including suicidal ideation, self-harm, and homicidal ideation  Thought Form:  Coherent  Goal Directed  Logical     Insight:    Good     Plan:     Safety  Plan: No current safety concerns identified.  Recommended that patient call 911 or go to the local ED should there be a change in any of these risk factors.     Barriers to treatment: None identified    Patient Contracts (see media tab):  None    Substance Use: Not addressed in session     Continue or Discharge: Patient will continue in 55+ Program (55+) as planned. Patient is likely to benefit from learning and using skills as they work toward the goals identified in their treatment plan. Winnie will continue for mood stabilization and symptom management education for mental health recovery.      Brittani Molina, [facility]  March 8, 2023

## 2023-03-09 ENCOUNTER — VIRTUAL VISIT (OUTPATIENT)
Dept: PSYCHOLOGY | Facility: CLINIC | Age: 68
End: 2023-03-09
Payer: MEDICARE

## 2023-03-09 ENCOUNTER — HOSPITAL ENCOUNTER (OUTPATIENT)
Dept: BEHAVIORAL HEALTH | Facility: CLINIC | Age: 68
Discharge: HOME | End: 2023-03-09
Attending: PSYCHIATRY & NEUROLOGY
Payer: MEDICARE

## 2023-03-09 DIAGNOSIS — F43.9 TRAUMA AND STRESSOR-RELATED DISORDER: ICD-10-CM

## 2023-03-09 DIAGNOSIS — F31.81 BIPOLAR 2 DISORDER (H): Primary | ICD-10-CM

## 2023-03-09 DIAGNOSIS — F41.1 GENERALIZED ANXIETY DISORDER: ICD-10-CM

## 2023-03-09 PROCEDURE — 99215 OFFICE O/P EST HI 40 MIN: CPT | Mod: VID | Performed by: PSYCHIATRY & NEUROLOGY

## 2023-03-09 PROCEDURE — 90837 PSYTX W PT 60 MINUTES: CPT | Mod: VID | Performed by: SOCIAL WORKER

## 2023-03-09 ASSESSMENT — ANXIETY QUESTIONNAIRES
GAD7 TOTAL SCORE: 15
7. FEELING AFRAID AS IF SOMETHING AWFUL MIGHT HAPPEN: SEVERAL DAYS
GAD7 TOTAL SCORE: 15
GAD7 TOTAL SCORE: 15
5. BEING SO RESTLESS THAT IT IS HARD TO SIT STILL: SEVERAL DAYS
6. BECOMING EASILY ANNOYED OR IRRITABLE: NEARLY EVERY DAY
2. NOT BEING ABLE TO STOP OR CONTROL WORRYING: NEARLY EVERY DAY
5. BEING SO RESTLESS THAT IT IS HARD TO SIT STILL: SEVERAL DAYS
7. FEELING AFRAID AS IF SOMETHING AWFUL MIGHT HAPPEN: SEVERAL DAYS
2. NOT BEING ABLE TO STOP OR CONTROL WORRYING: NEARLY EVERY DAY
3. WORRYING TOO MUCH ABOUT DIFFERENT THINGS: MORE THAN HALF THE DAYS
7. FEELING AFRAID AS IF SOMETHING AWFUL MIGHT HAPPEN: SEVERAL DAYS
GAD7 TOTAL SCORE: 15
8. IF YOU CHECKED OFF ANY PROBLEMS, HOW DIFFICULT HAVE THESE MADE IT FOR YOU TO DO YOUR WORK, TAKE CARE OF THINGS AT HOME, OR GET ALONG WITH OTHER PEOPLE?: EXTREMELY DIFFICULT
6. BECOMING EASILY ANNOYED OR IRRITABLE: NEARLY EVERY DAY
3. WORRYING TOO MUCH ABOUT DIFFERENT THINGS: MORE THAN HALF THE DAYS
1. FEELING NERVOUS, ANXIOUS, OR ON EDGE: NEARLY EVERY DAY
IF YOU CHECKED OFF ANY PROBLEMS ON THIS QUESTIONNAIRE, HOW DIFFICULT HAVE THESE PROBLEMS MADE IT FOR YOU TO DO YOUR WORK, TAKE CARE OF THINGS AT HOME, OR GET ALONG WITH OTHER PEOPLE: EXTREMELY DIFFICULT
8. IF YOU CHECKED OFF ANY PROBLEMS, HOW DIFFICULT HAVE THESE MADE IT FOR YOU TO DO YOUR WORK, TAKE CARE OF THINGS AT HOME, OR GET ALONG WITH OTHER PEOPLE?: EXTREMELY DIFFICULT
4. TROUBLE RELAXING: MORE THAN HALF THE DAYS
4. TROUBLE RELAXING: MORE THAN HALF THE DAYS
IF YOU CHECKED OFF ANY PROBLEMS ON THIS QUESTIONNAIRE, HOW DIFFICULT HAVE THESE PROBLEMS MADE IT FOR YOU TO DO YOUR WORK, TAKE CARE OF THINGS AT HOME, OR GET ALONG WITH OTHER PEOPLE: EXTREMELY DIFFICULT
7. FEELING AFRAID AS IF SOMETHING AWFUL MIGHT HAPPEN: SEVERAL DAYS
1. FEELING NERVOUS, ANXIOUS, OR ON EDGE: NEARLY EVERY DAY

## 2023-03-09 NOTE — PROGRESS NOTES
[facility]                                     Progress Note    Patient Name: Randi Cleary  Date: 3/9/23         Service Type: Individual     Session Start Time: 9:35 AM  Session End Time: 10:28 AM     Session Length: 53 minutes    Session #: 167    Attendees: Client attended alone    Service Modality:  Video Visit:      Provider verified identity through the following two step process.  Patient provided:  Patient is known previously to provider    Telemedicine Visit: The patient's condition can be safely assessed and treated via synchronous audio and visual telemedicine encounter.      Reason for Telemedicine Visit: Patient convenience (e.g. access to timely appointments / distance to available provider)    Originating Site (Patient Location): Patient's home    Distant Site (Provider Location): Provider Remote Setting- Home Office    Consent:  The patient/guardian has verbally consented to: the potential risks and benefits of telemedicine (video visit) versus in person care; bill my insurance or make self-payment for services provided; and responsibility for payment of non-covered services.     Patient would like the video invitation sent by:  My Chart    Mode of Communication:  [facility]    Distant Location (Provider):  Off-site    As the provider I attest to compliance with applicable laws and regulations related to telemedicine.    DATA  Extended Session (53+ minutes): PROLONGED SERVICE IN THE OUTPATIENT SETTING REQUIRING DIRECT (FACE-TO-FACE) PATIENT CONTACT BEYOND THE USUAL SERVICE:    - High distress and under complex circumstances.  See Data section for details  Interactive Complexity: No  Crisis: No        Progress Since Last Session (Related to Symptoms / Goals / Homework):   Symptoms: Patient continues to have some sadness and overwhelm    Homework: Progress made  Next session talk about family and continue with childhood memories    Decide next plan of action  for your health  Keep getting out in the community    In the future:  Look at a budget  Get back to the pool  Talk to your primary care doctor about what's on your list (mood, neck, arthritis)       Episode of Care Goals: Satisfactory progress - ACTION (Actively working towards change); Intervened by reinforcing change plan / affirming steps taken     Current / Ongoing Stressors and Concerns:   Patient is currently socially isolated. She has a conflictual relationship with her .  She is getting minimal physical activity.  She has had several surgeries.      Treatment Objective(s) Addressed in This Session:   Patient will increase frequency of engaging her in ADLs.  Patient will track and record at least 5 pleasant exchanges with . Patient will be able to identify at least 5 positive traits about her .  Patient will reduce level of depressive and anxious features as evidenced by reduction in score on her CHAVO-7 and PHQ-9 (scores of 15 and 16 at first measurment, respectively).     Intervention:   Supportive Therapy: Processed patient's health concerns and how they are impacting her.  Talked through next steps and how to take care of herself. Reviewed homework and identified successes and barriers to completion.  Set updated goals, including talking to psychiatrist from day treatment.        The following assessments were completed by patient for this visit:  PHQ9:   PHQ-9 SCORE 1/10/2023 1/11/2023 1/11/2023 1/30/2023 2/5/2023 2/14/2023 2/27/2023   PHQ-9 Total Score MyChart 16 (Moderately severe depression) - 15 (Moderately severe depression) 13 (Moderate depression) 18 (Moderately severe depression) 18 (Moderately severe depression) 20 (Severe depression)   PHQ-9 Total Score 16 15 15 13 18 18 20   Some encounter information is confidential and restricted. Go to Review Flowsheets activity to see all data.     GAD7:   CHAVO-7 SCORE 2/20/2023 2/20/2023 2/20/2023 2/20/2023 3/9/2023 3/9/2023 3/9/2023    Total Score - - - 17 (severe anxiety) - - 15 (severe anxiety)   Total Score 17 17 17 17 15 15 15   Some encounter information is confidential and restricted. Go to Review Flowsheets activity to see all data.     PROMIS 10-Global Health (only subscores and total score):   PROMIS-10 Scores Only 2/5/2023 2/5/2023 2/5/2023 2/5/2023 3/9/2023 3/9/2023 3/9/2023   Global Mental Health Score 5 5 5 5 6 6 6   Global Physical Health Score 8 8 8 8 6 6 6   PROMIS TOTAL - SUBSCORES 13 13 13 13 12 12 12   Some encounter information is confidential and restricted. Go to Review Flowsheets activity to see all data.        ASSESSMENT: Current Emotional / Mental Status (status of significant symptoms):   Risk status (Self / Other harm or suicidal ideation)   Patient denies current fears or concerns for personal safety.   Patient reports the following current or recent suicidal ideation or behaviors: increase in passive ideation over the weekend, felt like an intense sense of panic and hopelessness.   Patient denies current or recent homicidal ideation or behaviors.   Patient denies current or recent self injurious behavior or ideation.   Patient denies other safety concerns.   Patient reports there has been no change in risk factors since their last session.     Patient reports there has been no change in protective factors since their last session.     A safety and risk management plan has been developed including: Patient consented to co-developed safety plan on 11/24/2020, updated 2/20/23.  Safety and risk management plan was reviewed.   Patient agreed to use safety plan should any safety concerns arise.  A copy was made available to the patient.     Appearance:   Appropriate    Eye Contact:   Good    Psychomotor Behavior: Normal    Attitude:   Cooperative    Orientation:   All   Speech    Rate / Production: Normal/ Responsive    Volume:  Normal    Mood:    Normal   Affect:    Appropriate    Thought Content:  Clear    Thought  Form:  Coherent    Insight:    Good      Medication Review:   No changes to current psychiatric medication(s)     Medication Compliance:   Yes     Changes in Health Issues:   None reported     Chemical Use Review:   Substance Use: Chemical use reviewed, no active concerns identified Nothing used since 2021.     Tobacco Use: No current tobacco use.      Diagnosis:  1. Bipolar 2 disorder (H)    2. Generalized anxiety disorder    3. Trauma and stressor-related disorder      Collateral Reports Completed:   Not Applicable    PLAN: (Patient Tasks / Therapist Tasks / Other)  Next session talk about family and continue with childhood memories    Decide next plan of action for your health  Keep getting out in the community    In the future:  Look at a budget  Get back to the pool  Talk to your primary care doctor about what's on your list (mood, neck, arthritis)        There has been demonstrated improvement in functioning while patient has been engaged in psychotherapy/psychological service- if withdrawn the patient would deteriorate and/or relapse.     MICAELA SLADE   2023                                                        ______________________________________________________________________    Individual Treatment Plan    Patient's Name: Randi Cleary  YOB: 1955    Date of Creation: 20  Date Treatment Plan Last Reviewed/Revised: 3/2/23    DSM5 Diagnoses: 296.89 Bipolar II Disorder Depressed, 300.02 (F41.1) Generalized Anxiety Disorder or Adjustment Disorders  309.89 (F43.8) Other Specified Trauma and Stressor Related Disorder  Psychosocial / Contextual Factors: Patient's entire family of origin has , she now has a sister-in-law and  as support.  Relationship with  is conflictual. She is recovering from surgeries  PROMIS (reviewed every 90 days): PROMIS-10 Scores  PROMIS 10-Global Health (only subscores and total score):   PROMIS-10 Scores Only 2021  "3/15/2022 3/15/2022 3/15/2022 3/24/2022 4/1/2022 6/9/2022   Global Mental Health Score 6 6 6 6 8 12 12   Global Physical Health Score 9 9 9 9 8 11 10   PROMIS TOTAL - SUBSCORES 15 15 15 15 16 23 22       Referral / Collaboration:  Referral to another professional/service is not indicated at this time..    Anticipated number of session for this episode of care: 50  Anticipation frequency of session: Biweekly  Anticipated Duration of each session: 53 or more minutes due to intensity of trauma symptoms  Treatment plan will be reviewed in 90 days or when goals have been changed.   There has been demonstrated improvement in functioning while patient has been engaged in psychotherapy/psychological service- if withdrawn the patient would deteriorate and/or relapse.       MeasurableTreatment Goal(s) related to diagnosis / functional impairment(s)  Goal 1: Patient will \"jumpstart, getting going with the things I need to be doing around the house as far as picking up, doing things, trying to do something every day.  Also to lessen the animosity between me and my .\"    I will know I've met my goal when my shoulders are fixed and I can see.      Objective #A (Patient Action)    Patient will increase frequency of engaging her in ADLs.  Status: Continued - Date(s): 12/10/21, 3/9/22, 6/9/22, 9/2/22, 12/1/22, 3/2/23    Intervention(s)  Therapist will engage patient in CBT, specifically behavioral activation.    Objective #B  Patient will  track and record at least 5 pleasant exchanges with . Patient will be able to identify at least 5 positive traits about her  and how he relates to her.  Status: Continued - Date(s): 12/10/21, 3/9/22, 6/9/22, 9/2/22, 12/1/22, 3/2/23    Intervention(s)  Therapist will teach assertiveness skills and assign homework related to relationship interactions.    Objective #C  Patient will reduce level of depressive and anxious features as evidenced by reduction in score on her CHAVO-7 and " "PHQ-9 (scores of 15 and 16 at first measurment, respectively).  Status: Continued - Date(s): 12/10/21, 3/9/22, 6/9/22, 9/2/22, 12/1/22, 3/2/23    Intervention(s)  Therapist will engage patient in person-centered therapy and CBT.    Patient has reviewed and agreed to the above plan.      MICAELA SLADE  March 2, 2023                                                   Randi Cleary          SAFETY PLAN:  Step 1: Warning signs / cues (Thoughts, images, mood, situation, behavior) that a crisis may be developing:  ? Thoughts: \"I don't want to continue\" \"I am unwanted\"  ? Images: none  ? Thinking Processes: ruminating  ? Mood: anger  ? Behaviors: isolating/withdrawing , can't stop crying, not taking care of myself and not taking care of my responsibilities  ? Situations: small triggers, such as not being able to find something, or dropping something   Step 2: Coping strategies - Things I can do to take my mind off of my problems without contacting another person (relaxation technique, physical activity):  ? Distress Tolerance Strategies:  arts and crafts: drawing, play with my pet , listen to positive and upbeat music: any, change body temperature (ice pack/cold water)  and paced breathing/progressive muscle relaxation  ? Physical Activities: go for a walk, deep breathing and stretching   ? Focus on helpful thoughts:  \"You've been through this before, you can get through it again.\"  Step 3: People and social settings that provide distraction:                 Name: Carmen                            Name: Darien                           Name: Aleida       ? pool, shopping, Carmen's house, [employer]       Step 4: Remind myself of people and things that are important to me and worth living for:  Clifford Little Donna, post-COVID world, options of what could be in your future        Step 5: When I am in crisis, I can ask these people to help me use my safety plan:                 Name: Sidney  Step 6: Making the environment " safe:   ? go to sleep/daydream  Step 7: Professionals or agencies I can contact during a crisis:  ? [facility] Daytime Number: [phone]  ? Suicide Prevention Lifeline: 8-846-526-WMZQ (7572)  ? Crisis Text Line Service (available 24 hours a day, 7 days a week): Text [place] to 139976    Local Crisis Services: [facility] Crisis: [phone]  Adults can always access to the emPATH unit at [facility] (no phone number, utilize it like an urgent care or ER where you just show up)     Call 911 or go to my nearest emergency department.       I helped develop this safety plan and agree to use it when needed.  I have been given a copy of this plan.       Client signature _________________________________________________________________  Today s date:  11/24/2020  Adapted from Safety Plan Template 2008 Randi Poole and Robby Barba is reprinted with the express permission of the authors.  No portion of the Safety Plan Template may be reproduced without the express, written permission.  You can contact the authors at [email or web address] or [email or web address].

## 2023-03-09 NOTE — GROUP NOTE
Psychotherapy Group Note    PATIENT'S NAME: Randi Cleary  MRN:   [id number]  :   1955  ACCT. NUMBER: [id number]  DATE OF SERVICE: 3/08/23  START TIME: 11:00 AM  END TIME: 11:50 AM  FACILITATOR: Duane Bianchi  TOPIC: MH EBP Group: Specialty Awareness  [facility]+ [facility]  TRACK: A-1    NUMBER OF PARTICIPANTS: 7                                      Service Modality:  Video Visit     Telemedicine Visit: The patient's condition can be safely assessed and treated via synchronous audio and visual telemedicine encounter.      Reason for Telemedicine Visit: Services only offered telehealth    Originating Site (Patient Location): Patient's home    Distant Site (Provider Location): Provider Remote Setting- Home Office    Consent:  The patient/guardian has verbally consented to: the potential risks and benefits of telemedicine (video visit) versus in person care; bill my insurance or make self-payment for services provided; and responsibility for payment of non-covered services.     Patient would like the video invitation sent by:  My Chart    Mode of Communication:  Video Conference via [facility]    As the provider I attest to compliance with applicable laws and regulations related to telemedicine.     Summary of Group / Topics Discussed:  Specialty Topics: Forgiveness: Patients were provided with an overview of the topic of forgiveness including how to better understand the nature of forgiveness of others and oneself. Exploring the phases of forgiveness and how one works them was covered. Patients were able to explore how practicing forgiveness may positively impact their functioning.     Patient Session Goals / Objectives:    Discussed definitions of forgiveness and self-forgiveness    Explored the phases and process of forgiveness    Discussed benefits of forgiveness to their relationships with themselves and others, connecting the concept to mindfulness and acceptance    Assisted patients in  recognizing when practicing forgiveness may be helpful      Patient Participation / Response:  Fully participated with the group by sharing personal reflections / insights and openly received / provided feedback with other participants.    Demonstrated understanding of topics discussed through group discussion and participation    Treatment Plan:  Patient has a current master individualized treatment plan.  See Epic treatment plan for more information.    Duane Bianchi, LPCC, LADC
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
## 2023-03-10 ENCOUNTER — TRANSFERRED RECORDS (OUTPATIENT)
Dept: HEALTH INFORMATION MANAGEMENT | Facility: CLINIC | Age: 68
End: 2023-03-10

## 2023-03-13 ENCOUNTER — VIRTUAL VISIT (OUTPATIENT)
Dept: INTERNAL MEDICINE | Facility: CLINIC | Age: 68
End: 2023-03-13
Payer: MEDICARE

## 2023-03-13 DIAGNOSIS — F11.90 CHRONIC, CONTINUOUS USE OF OPIOIDS: ICD-10-CM

## 2023-03-13 DIAGNOSIS — E83.110 HEREDITARY HEMOCHROMATOSIS (H): ICD-10-CM

## 2023-03-13 DIAGNOSIS — R20.0 NUMBNESS IN BOTH HANDS: Primary | ICD-10-CM

## 2023-03-13 DIAGNOSIS — F31.81 BIPOLAR 2 DISORDER (H): ICD-10-CM

## 2023-03-13 DIAGNOSIS — G25.81 RESTLESS LEG SYNDROME: ICD-10-CM

## 2023-03-13 PROCEDURE — 99215 OFFICE O/P EST HI 40 MIN: CPT | Mod: VID | Performed by: INTERNAL MEDICINE

## 2023-03-13 NOTE — PROGRESS NOTES
Winnie is a 67 year old who is being evaluated via a billable video visit.      How would you like to obtain your AVS? MyChart  If the video visit is dropped, the invitation should be resent by: Send to e-mail at: [email or web address]  Will anyone else be joining your video visit? No        Assessment & Plan   Problem List Items Addressed This Visit        Nervous and Auditory    Numbness in both hands - Primary     She is reporting tingling and numbness in both of her hands. She had C3-6 laminoplasty with Dr. Gregory in the past, f/u CT C spine 12/2022 showed continued severe neural foraminal stenosis throughout C spine and severe central canal stenosis at C6-7. She has seen both Dr. Gregory and Dr. Palma ([place]) for this. Sounds like both of them do not feel surgery is warranted or indicated.   - Will get EMG to further evaluate  - Continue spine and ortho follow up          Relevant Orders    EMG    Chronic, continuous use of opioids     Managed by Dr. Dubois. Current regimen is hydrocodone 5 mg QID, trileptal 150 mg at bedtime, robaxin 500 mg TID.             Digestive    Hereditary hemochromatosis (H)     Follows with hematology, labs [id number], next visit 7/2023 with Dr. Pablo. Phlebotomy for ferritin >250. Last phlebotomy was 7/2022.             Musculoskeletal and Integumentary    Restless leg syndrome     Stable on Requip 2 mg in afternoon, 1 mg at bedtime and 1 mg when awake.   - Need to keep ferritin above 125            Behavioral    Bipolar 2 disorder (H)     Following with therapist, Dr. Dubois and now in day program with Dr. Das. Currently having a lot of trouble coping with her pain, which is likely affecting her mood. She has a very complicated history including serotonin syndrome, which makes starting or adding medications very difficult. She will continue day program and discuss medication options with both Dr. Dubois and Dr. Das.              Review of prior external note(s)  from - Outside records from [facility]  Ordering of each unique test  I spent a total of 44 minutes on the day of the visit.   Time spent doing chart review, history and exam, documentation and further activities per the note     Depression Screening Follow Up    PHQ 3/13/2023   PHQ-9 Total Score 16   Q9: Thoughts of better off dead/self-harm past 2 weeks Not at all   F/U: Thoughts of suicide or self-harm -   F/U: Self harm-plan -   F/U: Self-harm action -   F/U: Safety concerns -     Follow Up Actions Taken  Crisis resource information provided in After Visit Summary  She will continue in day program and follow up with Dr. Das and Dr. Dubois     Return in about 2 months (around 5/13/2023) for Follow up.    Chikis Randle MD  [facility] ALEJANDRA Zhou is a 67 year old, presenting for the following health issues:  Follow Up (Pt states had a major panic attack this morning, I appreciate this being a virtual visit)    History of Present Illness     Arthritis / Pain: Follows with [facility], Dr. Moise. Last saw him in January for her shoulder. At that visit he will hold off on surgical considerations for shoulder until neck stenosis is clarified and wanted her to see Dr. Palma for cervical spine for another opinion after MRI done here with Dr. Gregory in February.    - Pending figuring that out she likely will need shoulder for shoulder (L reverse TSA and R shoulder arthroscopic RCR or possible R reverse TSA)  - She says she did see Dr. Palma in February (I have not yet received these notes). Told her to use brace but she has a hard time functioning with the braces on.   - Uncontrolled pain in UEs with tingling in hands is really bothering her  - She is very frustrated as it seems no one is able to help her   - Very hesitant to see Dr. Gregory again    Pain: Related to above. Last saw Dr. Dubois 1/18/23. At that visit, hydrocodone increased 5 mg QID, trileptal 150  "mg at bedtime continued. Next visit scheduled for 4/11/23.     RLS: Requip 2 mg in afternoon, 1 mg at bedtime and 1 mg when awake.   - Need to keep ferritin above 125    Mental Health Follow-up: Continues to follow with [facility] for therapy (although no visits this week). She has started day treatment and is following with Dr. Das. She does think this is helping some. Says she is learning something new every day.   - Still having \"attacks\". Gets flustered and can't separate thoughts, then gets angry and tearful.   - Diagnosed with bipolar disorder and PTSD by Dr. Dubois in the past  -   Wonders if it is related to COVID isolation, stress of medical issues for last few years, etc.     Polycythemia: Likely 2/2 untreated KYAW    HH: Follows with hematology, labs [id number], next visit 7/2023 with Dr. Pablo. Phlebotomy for ferritin >250.     H/o pheochromocytoma: Was very concerned about recurrence. Did have a repeat urine studies, serum testing, and adrenal MRI on 2/25/23 with Dr. Camacho ([facility]). All negative.   - She will bring these records in for our review (BMP WNL, TSH 1.6, FT4 1.54), /70.     Today's PHQ-9         PHQ-9 Total Score: 16    PHQ-9 Q9 Thoughts of better off dead/self-harm past 2 weeks :   Not at all    How difficult have these problems made it for you to do your work, take care of things at home, or get along with other people: Extremely difficult    Review of Systems   Constitutional, HEENT, cardiovascular, pulmonary, GI, , musculoskeletal, neuro, skin, endocrine and psych systems are negative, except as otherwise noted.      Objective           Vitals:  No vitals were obtained today due to virtual visit.    Physical Exam   GENERAL: Healthy, alert and no distress  EYES: Eyes grossly normal to inspection.  No discharge or erythema, or obvious scleral/conjunctival abnormalities.  RESP: No audible wheeze, cough, or visible cyanosis.  No visible retractions or increased " work of breathing.    SKIN: Visible skin clear. No significant rash, abnormal pigmentation or lesions.  NEURO: Cranial nerves grossly intact.  Mentation and speech appropriate for age.  PSYCH: Mentation appears normal, affect normal/bright, judgement and insight intact, normal speech and appearance well-groomed.    CT C Spine 12/2/22:   FINDINGS:  VERTEBRA: Left C3 C6 laminoplasty. No acute fracture.  No acute post traumatic subluxations.   Normal vertebral body heights. Diffuse bony demineralization.     CANAL/FORAMINA: Reversal normal lordotic curvature. Multilevel spondylosis result in various levels and degrees of central canal stenosis and neural foraminal stenosis. C6-C7 severe central canal stenosis. Multilevel severe neural foraminal stenosis at   left C2-C3, bilateral C3-C4, left C4-C5, bilateral C5-C6, bilateral C6-C7, right C7-T1, right T1-T2, right T2-T3, left T3-T4.  Multiple levels demonstrate mild degenerative grade 1 subluxations.     PARASPINAL: No significant extraspinal abnormality.    IMPRESSION:   1.  No acute fracture.   2.  Left C3 C6 laminoplasty.   3.  Degenerative changes described above.    Video-Visit Details    Type of service:  Video Visit   Video Start Time: 11:38  Video End Time:12:08    Originating Location (pt. Location): Home  Distant Location (provider location):  On-site  Platform used for Video Visit: [employer]    Answers for HPI/ROS submitted by the patient on 3/13/2023  If you checked off any problems, how difficult have these problems made it for you to do your work, take care of things at home, or get along with other people?: Extremely difficult  PHQ9 TOTAL SCORE: 16  Depression/Anxiety: Depression & Anxiety  Status since last visit:: no change  Anxiety since last: : no change  Other associated symptoms of depression:: Yes  Other associated symotome: : Yes  Significant life event: : other  Anxious:: Yes  Current substance use:: No  On average, how many sweetened beverages do  you drink each day (Examples: soda, juice, sweet tea, etc.  Do NOT count diet or artificially sweetened beverages)?: 0  How many minutes a day do you exercise enough to make your heart beat faster?: 30 to 60  How many days a week do you exercise enough to make your heart beat faster?: 3 or less  How many days per week do you miss taking your medication?: 0  What is the reason for your visit today?: arthritis, consult,  When did your symptoms begin?: More than a month  How would you describe these symptoms?: Severe  Are your symptoms:: Worsening  Have you had these symptoms before?: No  Is there anything that makes you feel worse?: use  Is there anything that makes you feel better?: pool

## 2023-03-13 NOTE — PATIENT INSTRUCTIONS
Follow up with Dr. Moise about your shoulder  I ordered an EMG to look into causes for numbness in your hands  Follow up with Dr. Dubois about potentially starting another antidepressant   Follow up with me in 2 months, we can do labs at that visit - call [phone]

## 2023-03-14 ENCOUNTER — HOSPITAL ENCOUNTER (OUTPATIENT)
Dept: BEHAVIORAL HEALTH | Facility: CLINIC | Age: 68
Discharge: HOME | End: 2023-03-14
Attending: PSYCHIATRY & NEUROLOGY
Payer: MEDICARE

## 2023-03-14 PROCEDURE — 90853 GROUP PSYCHOTHERAPY: CPT | Mod: GT

## 2023-03-14 NOTE — GROUP NOTE
Psychotherapy Group Note    PATIENT'S NAME: Randi Cleary  MRN:   [id number]  :   1955  ACCT. NUMBER: [id number]  DATE OF SERVICE: 3/14/23  START TIME: 10:00 AM  END TIME: 10:50 AM  FACILITATOR: Luh Hare LGSW  TOPIC:  EBP Group: Coping Skills  [facility] 55+ Program  TRACK: A1    NUMBER OF PARTICIPANTS: 8    Summary of Group / Topics Discussed:  Coping Skills: Grounding: Patients discussed and practiced strategies to increase attachment / presence to the current moment.  Patients identified situations in which using these strategies will help improve emotion regulation sense of calm in the body.  Reviewed the benefits of applying grounding strategies, as well as past / current practices of each member.  Patients identified situations in which using these strategies would reduce stress. They developed the ability to distinguish when these strategies can be useful in their lives for management and stress and psychological well-being.    Patient Session Goals / Objectives:    Understand the purpose of using grounding strategies to reduce stress.    Verbalize understanding of how and when to apply grounding strategies to reduce distress and increase presence in the moment.    Review patients current grounding practices and discuss a more formal way of practicing and accessing skills.    Practice using various calming strategies (e.g. 5-4-3-2-1; mental and body awareness).    Choose 1-2 grounding strategies to apply during times of distress.                                      Service Modality:  Video Visit     Telemedicine Visit: The patient's condition can be safely assessed and treated via synchronous audio and visual telemedicine encounter.      Reason for Telemedicine Visit: Services only offered telehealth    Originating Site (Patient Location): Patient's home    Distant Site (Provider Location): Provider Remote Setting- Home Office    Consent:  The patient/guardian has verbally consented  to: the potential risks and benefits of telemedicine (video visit) versus in person care; bill my insurance or make self-payment for services provided; and responsibility for payment of non-covered services.     Patient would like the video invitation sent by:  My Chart    Mode of Communication:  Video Conference via Medical Zoom    As the provider I attest to compliance with applicable laws and regulations related to telemedicine.                                 Patient Participation / Response:  Moderately participated, sharing some personal reflections / insights and adequately adequately received / provided feedback with other participants.    Demonstrated understanding of topics discussed through group discussion and participation, Expressed understanding of the relevance / importance of coping skills at distressing times in life, Identified barriers to applying coping skills and Practiced 2-3 new coping skills in session    Treatment Plan:  Patient has a current master individualized treatment plan.  See Epic treatment plan for more information.    Luh Hare LGSW

## 2023-03-14 NOTE — GROUP NOTE
Process Group Note    PATIENT'S NAME: Randi Cleary  MRN:   [id number]  :   1955  ACCT. NUMBER: [id number]  DATE OF SERVICE: 3/14/23  START TIME:  9:00 AM  END TIME:  9:50 AM  FACILITATOR: Luh Hare LGSW  TOPIC:  Process Group    Diagnoses:  Principal DSM5 Diagnoses  (Sustained by DSM5 Criteria Listed Above):   296.33 (F33.2) Major Depressive Disorder, Recurrent Episode, Severe With anxious distress  300.02 (F41.1) Generalized Anxiety Disorder    Rule out:  296.89 Bipolar II Disorder vs. 314.01 (F90.2) Attention-Deficit/Hyperactivity Disorder   Combined presentation      [facility] 55+ Program  TRACK: A1    NUMBER OF PARTICIPANTS: 7                                      Service Modality:  Video Visit     Telemedicine Visit: The patient's condition can be safely assessed and treated via synchronous audio and visual telemedicine encounter.      Reason for Telemedicine Visit: Services only offered telehealth    Originating Site (Patient Location): Patient's home    Distant Site (Provider Location): Provider Remote Setting- Home Office    Consent:  The patient/guardian has verbally consented to: the potential risks and benefits of telemedicine (video visit) versus in person care; bill my insurance or make self-payment for services provided; and responsibility for payment of non-covered services.     Patient would like the video invitation sent by:  My Chart    Mode of Communication:  Video Conference via Medical Zoom    As the provider I attest to compliance with applicable laws and regulations related to telemedicine.                                   Data:    Session content: At the start of this group, patients were invited to check in by identifying themselves, describing their current emotional status, and identifying issues to address in this group.   Area(s) of treatment focus addressed in this session included Symptom Management, Personal Safety and Community Resources/Discharge  "Planning.  Reported mood is depressed and anxious. She reported having a \"really bad day\" yesterday but declined giving details in the group setting. She apologized for being absent on Friday due to a medical appointment. Client denies any chemical use.  Client is taking medications as prescribed.  Client processed feelings related to a difficult excursion to visit her sick friend last Thursday.  She reported first going to the wrong location followed by slushy roads causing her to be unable to get back to her car.  She waved down a passerby who then held her hand as she walked across the street to her car.  Despite her fear of icy sidewalks, client acknowledged that the visit with her friend ultimately \"worked out\".  Client reported using the following coping skills since last session: seeking companionship from a friend, asking for help, finding the positive in a negative situation.  Writer commended client for her ability to get through this tough situation. Peers offered supportive feedback and validation.    During the break, client reported passive SI with no plan or intent.  She reported calling the National Suicide Hotline yesterday.  She reported finding treatment group today helpful.  Her SI involves feelings of panic and inability to cope with medical stressors.  Writer offered supportive feedback and validation.  She is currently on the cancellation list of her individual therapist as she does not meet with her again until April.  Client committed to attending group on Wednesday.  Writer will send client a copy of her safety plan.  Client has a plan to take a nap, focus on gratitude and cancel the rest of her medical appointments this week to focus on her mental health.     Client also asked writer to relay a message to Dr. Das that her recent \"big test\" came back negative.  She expressed frustration that her primary internist (Dr. Dubois) didn't understand her message about wanting to try a new " antidepressant.  She shared that she is currently off of antidepressants due to having Serotonin Syndrome in 2019.  She declined taking Lithium and wonders if that is where the miscommunication happened. She feels like her symptoms are too acute to get an accurate message across to Dr. Dubois.   Writer plans on problem solving with client tomorrow related to advocating for herself (e.g.  could help communicate with Dr. Dubois, she could write down her concerns ahead of her appointments, etc.).      Therapeutic Interventions/Treatment Strategies:  Psychotherapist offered support, feedback and validation and reinforced use of skills. Treatment modalities used include Cognitive Behavioral Therapy. Interventions include Cognitive Restructuring:  Explored impact of ineffective thoughts / distortions on mood and activity and Assisted patient in formulating new neutral/positive alternatives to challenge less helpful / ineffective thoughts, Coping Skills: discussed asking for help to reduce symptoms and Symptoms Management: Promoted understanding of their diagnoses and how it impacts their functioning.    Assessment:    Patient response:   Patient responded to session by accepting feedback, giving feedback, listening, focusing on goals, being attentive and accepting support    Possible barriers to participation / learning include: and no barriers identified    Health Issues:   Yes: Pain, Associated Psychological Distress  Chronic disease management, Associated Psychological Distress history of COPD       Substance Use Review:   Substance Use: No active concerns identified.    Mental Status/Behavioral Observations  Appearance:   Appropriate   Eye Contact:   Good   Psychomotor Behavior: Normal   Attitude:   Cooperative  Interested Friendly Pleasant  Orientation:   All  Speech   Rate / Production: Normal    Volume:  Normal   Mood:    Anxious  Depressed  Panicked  Affect:    Tearful Worrisome   Thought Content:    Clear and Safety reports  presence of suicidal ideation passive suicidal ideation   Thought Form:  Coherent  Logical     Insight:    Good     Plan:     Safety Plan: Recommended that patient call 911 or go to the local ED should there be a change in any of these risk factors.    Committed to safety and agreed to follow previously developed safety coping plan.      Barriers to treatment: None identified    Patient Contracts (see media tab):  None    Substance Use: Not addressed in session     Continue or Discharge: Patient will continue in 55+ Program (55+) as planned. Patient is likely to benefit from learning and using skills as they work toward the goals identified in their treatment plan.      Luh Hare, ROSY  March 14, 2023

## 2023-03-14 NOTE — GROUP NOTE
Psychotherapy Group Note    PATIENT'S NAME: Randi Cleary  MRN:   [id number]  :   1955  ACCT. NUMBER: [id number]  DATE OF SERVICE: 3/14/23  START TIME: 11:00 AM  END TIME: 11:50 AM  FACILITATOR: Luh Hare LGSW  TOPIC: MH EBP Group: Self-Awareness   [facility] 55+ Program  TRACK: A1    NUMBER OF PARTICIPANTS: 7    Summary of Group / Topics Discussed:  Self-Awareness: Gratitude: Topic focused on assisting patients in identifying key concepts in gratitude. Patients discussed the benefits of practicing gratitude and its impact on mood improvement, mindfulness, and perspective. Patients worked to increase time spent on recognition and appreciation of what is positive and working in their lives. Patients discussed the concepts and benefits of feeling grateful. The goal is to reduce rumination and negative thinking resulting in increased mindfulness and resilience. Patients specifically discussed how they can practice and problem solve barriers to daily gratitude practice.     Patient Session Goals / Objectives:    [place] the concept and benefits of gratitude    Identified ways to practice gratitude in daily life    Problem solved barriers to practicing gratitude                                      Service Modality:  Video Visit     Telemedicine Visit: The patient's condition can be safely assessed and treated via synchronous audio and visual telemedicine encounter.      Reason for Telemedicine Visit: Services only offered telehealth    Originating Site (Patient Location): Patient's home    Distant Site (Provider Location): Provider Remote Setting- Home Office    Consent:  The patient/guardian has verbally consented to: the potential risks and benefits of telemedicine (video visit) versus in person care; bill my insurance or make self-payment for services provided; and responsibility for payment of non-covered services.     Patient would like the video invitation sent by:  My Chart    Mode of  Communication:  Video Conference via Medical Zoom    As the provider I attest to compliance with applicable laws and regulations related to telemedicine.                               Patient Participation / Response:  Moderately participated, sharing some personal reflections / insights and adequately adequately received / provided feedback with other participants.    Demonstrated understanding of topics discussed through group discussion and participation, Demonstrated understanding of values, strengths, and challenges to learn about themselves, Identified / Expressed readiness to act intentionally, increase self-compassion, promote personal growth and Identified plan to address barriers to practicing skills that promote self-awareness     Treatment Plan:  Patient has a current master individualized treatment plan.  See Epic treatment plan for more information.    Luh Hare LGSW

## 2023-03-15 ENCOUNTER — HOSPITAL ENCOUNTER (OUTPATIENT)
Dept: BEHAVIORAL HEALTH | Facility: CLINIC | Age: 68
Discharge: HOME | End: 2023-03-15
Attending: PSYCHIATRY & NEUROLOGY
Payer: MEDICARE

## 2023-03-15 PROCEDURE — 90853 GROUP PSYCHOTHERAPY: CPT | Mod: GT | Performed by: SOCIAL WORKER

## 2023-03-15 PROCEDURE — 90853 GROUP PSYCHOTHERAPY: CPT | Mod: GT

## 2023-03-15 NOTE — GROUP NOTE
Psychoeducation Group Note    PATIENT'S NAME: Randi Cleary  MRN:   [id number]  :   1955  ACCT. NUMBER: [id number]  DATE OF SERVICE: 3/15/23  START TIME: 10:00 AM  END TIME: 10:50 AM  FACILITATOR: Brittani Molina LICSW  TOPIC: MH Life Skills Group: Resiliency Development  Service Modality:  Video Visit     Telemedicine Visit: The patient's condition can be safely assessed and treated via synchronous audio and visual telemedicine encounter.       Reason for Telemedicine Visit: Services only offered telehealth and due to COVID-19.     Originating Site (Patient Location): Patient's home     Distant Site (Provider Location): Provider Remote Setting- Home Office     Consent:  The patient/guardian has verbally consented to: the potential risks and benefits of telemedicine (video visit) versus in person care; bill my insurance or make self-payment for services provided; and responsibility for payment of non-covered services.      Patient would like the video invitation sent by:  My Chart     Mode of Communication:  Video Conference via Medical Zoom     As the provider I attest to compliance with applicable laws and regulations related to telemedicine.    [facility] 55+ Program  TRACK: A1    NUMBER OF PARTICIPANTS: 7      Summary of Group / Topics Discussed:  Resiliency Development:  Health Aging, Longevity: Patients were taught the key components to healthy aging, longevity and prevention strategies for overall stress management.  Patients were given the opportunity to identify both ongoing and acute mental health symptoms and how to effectively manage these symptoms by developing an effective aftercare plan.  Patients increased awareness of community based resources.    Patient Session Goals / Objectives:    Identified how using coping skills can be used for symptom, stress management  & overall wellness.     Established a wellness plan to practice these skills in their own  environments    Practiced and reflected on how to generalize taught skills to their everyday life    Education provided through the [employer].      Patient Participation / Response:  Fully participated with the group by sharing personal reflections / insights and openly received / provided feedback with other participants.    Verbalized understanding of content    Treatment Plan:  Patient has a current master individualized treatment plan.  See Epic treatment plan for more information.    Brittani Molina, LICSW

## 2023-03-15 NOTE — GROUP NOTE
Psychoeducation Group Note    PATIENT'S NAME: Randi Cleary  MRN:   [id number]  :   1955  ACCT. NUMBER: [id number]  DATE OF SERVICE: 3/15/23  START TIME: 11:00 AM  END TIME: 11:50 AM  FACILITATOR: Luh Hare LGSW  TOPIC:  Life Skills Group: Life Skills   [facility] 55+ Program  TRACK: A1    NUMBER OF PARTICIPANTS: 7    Summary of Group / Topics Discussed:  Life Skills:  Life Skills:  Decision Making:  Patient's reviewed a process for decision making and strategies for making effective decisions.  Barriers to decision making were discussed    Patient Session Goals / Objectives:       Review process for making decisions       Patients learn strategies for making effective decisions       Patients wrote out a pros and cons list for a decision and identified barriers to decision making.                                      Service Modality:  Video Visit     Telemedicine Visit: The patient's condition can be safely assessed and treated via synchronous audio and visual telemedicine encounter.      Reason for Telemedicine Visit: Services only offered telehealth    Originating Site (Patient Location): Patient's home    Distant Site (Provider Location): Provider Remote Setting- Home Office    Consent:  The patient/guardian has verbally consented to: the potential risks and benefits of telemedicine (video visit) versus in person care; bill my insurance or make self-payment for services provided; and responsibility for payment of non-covered services.     Patient would like the video invitation sent by:  My Chart    Mode of Communication:  Video Conference via Medical Zoom    As the provider I attest to compliance with applicable laws and regulations related to telemedicine.                               Patient Participation / Response:  Fully participated with the group by sharing personal reflections / insights and openly received / provided feedback with other participants.    Demonstrated  understanding of content through Demonstrated understanding of content through discussion of barriers to and strategies for making decisions.  Conducted a cost benefit analysis for behavior change.   and Verbalized understanding of content    Treatment Plan:  Patient has a current master individualized treatment plan.  See Epic treatment plan for more information.    Luh Hare LGSW

## 2023-03-15 NOTE — GROUP NOTE
Process Group Note    PATIENT'S NAME: Randi Cleary  MRN:   [id number]  :   1955  ACCT. NUMBER: [id number]  DATE OF SERVICE: 3/15/23  START TIME:  9:00 AM  END TIME:  9:50 AM  FACILITATOR: Brittani Molina [facility]  TOPIC:  Process Group    Diagnoses:   296.33 (F33.2) Major Depressive Disorder, Recurrent Episode, Severe With anxious distress  300.02 (F41.1) Generalized Anxiety Disorder    Rule out:  296.89 Bipolar II Disorder vs. 314.01 (F90.2) Attention-Deficit/Hyperactivity Disorder   Combined presentation         Service Modality:  Video Visit     Telemedicine Visit: The patient's condition can be safely assessed and treated via synchronous audio and visual telemedicine encounter.       Reason for Telemedicine Visit: Services only offered telehealth and due to COVID-19.     Originating Site (Patient Location): Patient's home     Distant Site (Provider Location): Provider Remote Setting- Home Office     Consent:  The patient/guardian has verbally consented to: the potential risks and benefits of telemedicine (video visit) versus in person care; bill my insurance or make self-payment for services provided; and responsibility for payment of non-covered services.      Patient would like the video invitation sent by:  My Chart     Mode of Communication:  Video Conference via Medical Zoom     As the provider I attest to compliance with applicable laws and regulations related to telemedicine.    [facility] 55+ Program  TRACK: A1    NUMBER OF PARTICIPANTS: 7          Data:    Session content: At the start of this group, patients were invited to check in by identifying themselves, describing their current emotional status, and identifying issues to address in this group.   Area(s) of treatment focus addressed in this session included Symptom Management, Personal Safety, Community Resources/Discharge Planning, Abstinence/Relapse Prevention, Develop / Improve Independent Living Skills, Develop  Socialization / Interpersonal Relationship Skills and Physical Health .    Winnie reports depressed and anxious mood with worry. Winnie feels tired. Denies S/I or safety issues today. A few days ago used community resources with [facility]. Winnie is using coping skills for symptom management including grounding and cooking. She was able to make a delicious pasta dinner for her and her . He gave her the feedback that it was better than the [employer].       Therapeutic Interventions/Treatment Strategies:  Psychotherapist offered support, feedback and validation and reinforced use of skills. Treatment modalities used include Cognitive Behavioral Therapy.    Assessment:    Patient response:   Patient responded to session by accepting feedback, giving feedback, listening, focusing on goals, being attentive, accepting support and verbalizing understanding    Possible barriers to participation / learning include: and no barriers identified    Health Issues:   Yes: Chronic disease management, Associated Psychological Distress       Substance Use Review:   Substance Use: No active concerns identified.    Mental Status/Behavioral Observations  Appearance:   Appropriate   Eye Contact:   Good   Psychomotor Behavior: Normal   Attitude:   Cooperative  Interested  Orientation:   All  Speech   Rate / Production: Normal    Volume:  Normal   Mood:    Anxious  Depressed   Affect:    Appropriate  Worrisome   Thought Content:   Clear and Safety denies any current safety concerns including suicidal ideation, self-harm, and homicidal ideation  Thought Form:  Coherent  Goal Directed  Logical     Insight:    Good     Plan:     Safety Plan: No current safety concerns identified.  Recommended that patient call 911 or go to the local ED should there be a change in any of these risk factors.     Barriers to treatment: None identified    Patient Contracts (see media tab):  None    Substance Use: Not addressed in session     Continue  or Discharge: Patient will continue in 55+ Program (55+) as planned. Patient is likely to benefit from learning and using skills as they work toward the goals identified in their treatment plan.      Brittani Molina, [facility]  March 15, 2023

## 2023-03-16 ENCOUNTER — MYC MEDICAL ADVICE (OUTPATIENT)
Dept: INTERNAL MEDICINE | Facility: CLINIC | Age: 68
End: 2023-03-16
Payer: MEDICARE

## 2023-03-16 DIAGNOSIS — M25.50 PAIN IN JOINT, MULTIPLE SITES: Primary | ICD-10-CM

## 2023-03-16 PROBLEM — F11.90 CHRONIC, CONTINUOUS USE OF OPIOIDS: Status: ACTIVE | Noted: 2023-03-16

## 2023-03-16 PROBLEM — R20.0 NUMBNESS IN BOTH HANDS: Status: ACTIVE | Noted: 2023-03-16

## 2023-03-16 NOTE — ASSESSMENT & PLAN NOTE
Follows with hematology, labs [id number], next visit 7/2023 with Dr. Pablo. Phlebotomy for ferritin >250. Last phlebotomy was 7/2022.

## 2023-03-16 NOTE — ASSESSMENT & PLAN NOTE
Managed by Dr. Dubois. Current regimen is hydrocodone 5 mg QID, trileptal 150 mg at bedtime, robaxin 500 mg TID.

## 2023-03-16 NOTE — ASSESSMENT & PLAN NOTE
Following with therapist, Dr. Dubois and now in day program with Dr. Das. Currently having a lot of trouble coping with her pain, which is likely affecting her mood. She has a very complicated history including serotonin syndrome, which makes starting or adding medications very difficult. She will continue day program and discuss medication options with both Dr. Dubois and Dr. Das.

## 2023-03-16 NOTE — ASSESSMENT & PLAN NOTE
Stable on Requip 2 mg in afternoon, 1 mg at bedtime and 1 mg when awake.   - Need to keep ferritin above 125

## 2023-03-16 NOTE — ASSESSMENT & PLAN NOTE
She is reporting tingling and numbness in both of her hands. She had C3-6 laminoplasty with Dr. Gregory in the past, f/u CT C spine 12/2022 showed continued severe neural foraminal stenosis throughout C spine and severe central canal stenosis at C6-7. She has seen both Dr. Gregory and Dr. Palma ([place]) for this. Sounds like both of them do not feel surgery is warranted or indicated.   - Will get EMG to further evaluate  - Continue spine and ortho follow up

## 2023-03-20 ENCOUNTER — VIRTUAL VISIT (OUTPATIENT)
Dept: PSYCHOLOGY | Facility: CLINIC | Age: 68
End: 2023-03-20
Payer: MEDICARE

## 2023-03-20 DIAGNOSIS — F43.9 TRAUMA AND STRESSOR-RELATED DISORDER: ICD-10-CM

## 2023-03-20 DIAGNOSIS — F31.81 BIPOLAR 2 DISORDER (H): Primary | ICD-10-CM

## 2023-03-20 DIAGNOSIS — F41.1 GENERALIZED ANXIETY DISORDER: ICD-10-CM

## 2023-03-20 PROCEDURE — 90837 PSYTX W PT 60 MINUTES: CPT | Mod: VID | Performed by: SOCIAL WORKER

## 2023-03-20 NOTE — PROGRESS NOTES
[facility]                                     Progress Note    Patient Name: Rnadi Cleary  Date: 3/20/23         Service Type: Individual     Session Start Time: 10:35 AM  Session End Time: 11:31 AM     Session Length: 56 minutes    Session #: 168    Attendees: Client attended alone    Service Modality:  Video Visit:      Provider verified identity through the following two step process.  Patient provided:  Patient is known previously to provider    Telemedicine Visit: The patient's condition can be safely assessed and treated via synchronous audio and visual telemedicine encounter.      Reason for Telemedicine Visit: Patient convenience (e.g. access to timely appointments / distance to available provider)    Originating Site (Patient Location): Patient's home    Distant Site (Provider Location): [facility]    Consent:  The patient/guardian has verbally consented to: the potential risks and benefits of telemedicine (video visit) versus in person care; bill my insurance or make self-payment for services provided; and responsibility for payment of non-covered services.     Patient would like the video invitation sent by:  My Chart    Mode of Communication:  Video Conference via [facility]    Distant Location (Provider):  On-site    As the provider I attest to compliance with applicable laws and regulations related to telemedicine.    DATA  Extended Session (53+ minutes): PROLONGED SERVICE IN THE OUTPATIENT SETTING REQUIRING DIRECT (FACE-TO-FACE) PATIENT CONTACT BEYOND THE USUAL SERVICE:    - High distress and under complex circumstances.  See Data section for details  Interactive Complexity: No  Crisis: No        Progress Since Last Session (Related to Symptoms / Goals / Homework):   Symptoms: Patient had some suicidal thoughts last week to the point of calling [facility]    Homework: Progress made  Next session talk about family and continue with  childhood memories    Decide next plan of action for your health -done  Keep getting out in the community    In the future:  Look at a budget  Get back to the pool  Talk to your primary care doctor about what's on your list (mood, neck, arthritis)         Episode of Care Goals: Satisfactory progress - ACTION (Actively working towards change); Intervened by reinforcing change plan / affirming steps taken     Current / Ongoing Stressors and Concerns:   Patient is currently socially isolated. She has a conflictual relationship with her .  She is getting minimal physical activity.  She has had several surgeries.      Treatment Objective(s) Addressed in This Session:   Patient will increase frequency of engaging her in ADLs.  Patient will track and record at least 5 pleasant exchanges with . Patient will be able to identify at least 5 positive traits about her .  Patient will reduce level of depressive and anxious features as evidenced by reduction in score on her CHAVO-7 and PHQ-9 (scores of 15 and 16 at first measurment, respectively).     Intervention:   Supportive Therapy:  Processed patient's health concerns and her experiences trying to get the help she needs to navigate this. Processed patient's suicidal thoughts that she relates to her physical health concerns and what it was like to get this support. Processed situations that occurred with peers and how they impacted her.      The following assessments were completed by patient for this visit:  PHQ9:   PHQ-9 SCORE 1/11/2023 1/30/2023 2/5/2023 2/14/2023 2/27/2023 3/13/2023 3/20/2023   PHQ-9 Total Score MyChart 15 (Moderately severe depression) 13 (Moderate depression) 18 (Moderately severe depression) 18 (Moderately severe depression) 20 (Severe depression) 16 (Moderately severe depression) 15 (Moderately severe depression)   PHQ-9 Total Score 15 13 18 18 20 16 15   Some encounter information is confidential and restricted. Go to Review  Flowsheets activity to see all data.     GAD7:   CHAVO-7 SCORE 2/20/2023 2/20/2023 2/20/2023 2/20/2023 3/9/2023 3/9/2023 3/9/2023   Total Score - - - 17 (severe anxiety) - - 15 (severe anxiety)   Total Score 17 17 17 17 15 15 15   Some encounter information is confidential and restricted. Go to Review Flowsheets activity to see all data.     PROMIS 10-Global Health (only subscores and total score):   PROMIS-10 Scores Only 2/5/2023 2/5/2023 2/5/2023 2/5/2023 3/9/2023 3/9/2023 3/9/2023   Global Mental Health Score 5 5 5 5 6 6 6   Global Physical Health Score 8 8 8 8 6 6 6   PROMIS TOTAL - SUBSCORES 13 13 13 13 12 12 12   Some encounter information is confidential and restricted. Go to Review Flowsheets activity to see all data.        ASSESSMENT: Current Emotional / Mental Status (status of significant symptoms):   Risk status (Self / Other harm or suicidal ideation)   Patient denies current fears or concerns for personal safety.   Patient reports the following current or recent suicidal ideation or behaviors: suicial thoughts to the point of calling a crisis line.   Patient denies current or recent homicidal ideation or behaviors.   Patient denies current or recent self injurious behavior or ideation.   Patient denies other safety concerns.   Patient reports there has been no change in risk factors since their last session.     Patient reports there has been no change in protective factors since their last session.     A safety and risk management plan has been developed including: Patient consented to co-developed safety plan on 11/24/2020, updated 2/20/23.  Safety and risk management plan was reviewed.   Patient agreed to use safety plan should any safety concerns arise.  A copy was made available to the patient.     Appearance:   Appropriate    Eye Contact:   Good    Psychomotor Behavior: Normal    Attitude:   Cooperative    Orientation:   All   Speech    Rate / Production: Normal/ Responsive    Volume:  Normal     Mood:    Normal   Affect:    Appropriate    Thought Content:  Clear    Thought Form:  Coherent    Insight:    Good      Medication Review:   No changes to current psychiatric medication(s)     Medication Compliance:   Yes     Changes in Health Issues:   None reported     Chemical Use Review:   Substance Use: Chemical use reviewed, no active concerns identified Nothing used since 2021.     Tobacco Use: No current tobacco use.      Diagnosis:  1. Bipolar 2 disorder (H)    2. Generalized anxiety disorder    3. Trauma and stressor-related disorder      Collateral Reports Completed:   Not Applicable    PLAN: (Patient Tasks / Therapist Tasks / Other)  Next session talk about family and continue with childhood memories    Decide next plan of action for your health  Keep getting out in the community    In the future:  Look at a budget  Get back to the pool  Talk to your primary care doctor about what's on your list (mood, neck, arthritis)        There has been demonstrated improvement in functioning while patient has been engaged in psychotherapy/psychological service- if withdrawn the patient would deteriorate and/or relapse.     MICAELA SLADE   2023                                                        ______________________________________________________________________    Individual Treatment Plan    Patient's Name: Randi Cleary  YOB: 1955    Date of Creation: 20  Date Treatment Plan Last Reviewed/Revised: 3/2/23    DSM5 Diagnoses: 296.89 Bipolar II Disorder Depressed, 300.02 (F41.1) Generalized Anxiety Disorder or Adjustment Disorders  309.89 (F43.8) Other Specified Trauma and Stressor Related Disorder  Psychosocial / Contextual Factors: Patient's entire family of origin has , she now has a sister-in-law and  as support.  Relationship with  is conflictual. She is recovering from surgeries  PROMIS (reviewed every 90 days): PROMIS-10 Scores  PROMIS  "10-Global Health (only subscores and total score):   PROMIS-10 Scores Only 12/14/2021 3/15/2022 3/15/2022 3/15/2022 3/24/2022 4/1/2022 6/9/2022   Global Mental Health Score 6 6 6 6 8 12 12   Global Physical Health Score 9 9 9 9 8 11 10   PROMIS TOTAL - SUBSCORES 15 15 15 15 16 23 22       Referral / Collaboration:  Referral to another professional/service is not indicated at this time..    Anticipated number of session for this episode of care: 50  Anticipation frequency of session: Biweekly  Anticipated Duration of each session: 53 or more minutes due to intensity of trauma symptoms  Treatment plan will be reviewed in 90 days or when goals have been changed.   There has been demonstrated improvement in functioning while patient has been engaged in psychotherapy/psychological service- if withdrawn the patient would deteriorate and/or relapse.       MeasurableTreatment Goal(s) related to diagnosis / functional impairment(s)  Goal 1: Patient will \"jumpstart, getting going with the things I need to be doing around the house as far as picking up, doing things, trying to do something every day.  Also to lessen the animosity between me and my .\"    I will know I've met my goal when my shoulders are fixed and I can see.      Objective #A (Patient Action)    Patient will increase frequency of engaging her in ADLs.  Status: Continued - Date(s): 12/10/21, 3/9/22, 6/9/22, 9/2/22, 12/1/22, 3/2/23    Intervention(s)  Therapist will engage patient in CBT, specifically behavioral activation.    Objective #B  Patient will  track and record at least 5 pleasant exchanges with . Patient will be able to identify at least 5 positive traits about her  and how he relates to her.  Status: Continued - Date(s): 12/10/21, 3/9/22, 6/9/22, 9/2/22, 12/1/22, 3/2/23    Intervention(s)  Therapist will teach assertiveness skills and assign homework related to relationship interactions.    Objective #C  Patient will reduce level " "of depressive and anxious features as evidenced by reduction in score on her CHAVO-7 and PHQ-9 (scores of 15 and 16 at first measurment, respectively).  Status: Continued - Date(s): 12/10/21, 3/9/22, 6/9/22, 9/2/22, 12/1/22, 3/2/23    Intervention(s)  Therapist will engage patient in person-centered therapy and CBT.    Patient has reviewed and agreed to the above plan.      MICAELA SLADE  March 2, 2023                                                   Randi Cleary          SAFETY PLAN:  Step 1: Warning signs / cues (Thoughts, images, mood, situation, behavior) that a crisis may be developing:  ? Thoughts: \"I don't want to continue\" \"I am unwanted\"  ? Images: none  ? Thinking Processes: ruminating  ? Mood: anger  ? Behaviors: isolating/withdrawing , can't stop crying, not taking care of myself and not taking care of my responsibilities  ? Situations: small triggers, such as not being able to find something, or dropping something   Step 2: Coping strategies - Things I can do to take my mind off of my problems without contacting another person (relaxation technique, physical activity):  ? Distress Tolerance Strategies:  arts and crafts: drawing, play with my pet , listen to positive and upbeat music: any, change body temperature (ice pack/cold water)  and paced breathing/progressive muscle relaxation  ? Physical Activities: go for a walk, deep breathing and stretching   ? Focus on helpful thoughts:  \"You've been through this before, you can get through it again.\"  Step 3: People and social settings that provide distraction:                 Name: Carmen                            Name: Darien                           Name: Aleida       ? pool, shopping, Carmen's house, [employer]       Step 4: Remind myself of people and things that are important to me and worth living for:  Clifford Little Donna, post-COVID world, options of what could be in your future        Step 5: When I am in crisis, I can ask these people to " help me use my safety plan:                 Name: Sidney  Step 6: Making the environment safe:   ? go to sleep/daydream  Step 7: Professionals or agencies I can contact during a crisis:  ? [facility] Daytime Number: [phone]  ? Suicide Prevention Lifeline: 5-898-244-TALK 8271)  ? Crisis Text Line Service (available 24 hours a day, 7 days a week): Text [place] to 580713    Local Crisis Services: [facility] Crisis: [phone]  Adults can always access to the emPATH unit at [facility] (no phone number, utilize it like an urgent care or ER where you just show up)     Call 911 or go to my nearest emergency department.       I helped develop this safety plan and agree to use it when needed.  I have been given a copy of this plan.       Client signature _________________________________________________________________  Today s date:  11/24/2020  Adapted from Safety Plan Template 2008 Randi Poole and Robby Barba is reprinted with the express permission of the authors.  No portion of the Safety Plan Template may be reproduced without the express, written permission.  You can contact the authors at [email or web address] or [email or web address].

## 2023-03-21 ENCOUNTER — HOSPITAL ENCOUNTER (OUTPATIENT)
Dept: BEHAVIORAL HEALTH | Facility: CLINIC | Age: 68
Discharge: HOME | End: 2023-03-21
Attending: PSYCHIATRY & NEUROLOGY
Payer: MEDICARE

## 2023-03-21 PROCEDURE — 90853 GROUP PSYCHOTHERAPY: CPT | Mod: GT | Performed by: SOCIAL WORKER

## 2023-03-21 PROCEDURE — 90853 GROUP PSYCHOTHERAPY: CPT | Mod: GT,PO

## 2023-03-21 NOTE — GROUP NOTE
Psychotherapy Group Note    PATIENT'S NAME: Randi Cleary  MRN:   [id number]  :   1955  ACCT. NUMBER: [id number]  DATE OF SERVICE: 3/21/23  START TIME: 11:00 AM  END TIME: 11:50 AM  FACILITATOR: Luh Hare LGSW  TOPIC: MH EBP Group: Emotions Management  [facility] 55+ Program  TRACK: A1    NUMBER OF PARTICIPANTS: 7    Summary of Group / Topics Discussed:  Emotions Management: Opposite to Emotion: Patients discussed past and present struggles with knowing how to make changes in their lives due to difficult emotional experiences.  Explored desires to experience and feel less anger, sadness, guilt, and fear.  Reviewed the therapeutic skill of opposite action and patients explored opportunities to use their behaviors as a tool to reduce an emotion that they want to change.     Patient Session Goals / Objectives:    Review DBT concepts and focus on patient s experiences of distress and difficult emotional experiences.    Learn how to do the opposite of what an emotion makes us want to do in an effort to decrease an unwanted emotional experience.    Demonstrate understanding of the skill of opposite action by sharing experiences where the technique could be useful in past / present situations.                                      Service Modality:  Video Visit     Telemedicine Visit: The patient's condition can be safely assessed and treated via synchronous audio and visual telemedicine encounter.      Reason for Telemedicine Visit: Services only offered telehealth    Originating Site (Patient Location): Patient's home    Distant Site (Provider Location): Provider Remote Setting- Home Office    Consent:  The patient/guardian has verbally consented to: the potential risks and benefits of telemedicine (video visit) versus in person care; bill my insurance or make self-payment for services provided; and responsibility for payment of non-covered services.     Patient would like the video invitation sent  by:  My Chart    Mode of Communication:  Video Conference via Medical Zoom    As the provider I attest to compliance with applicable laws and regulations related to telemedicine.                               Patient Participation / Response:  Fully participated with the group by sharing personal reflections / insights and openly received / provided feedback with other participants.    Demonstrated understanding of topics discussed through group discussion and participation, Expressed understanding of the relevance / importance of emotions management skills at distressing times in life, Self-aware of experiences with difficult emotions, and strategies to employ to manage them, Demonstrated knowledge of when to consider applying a variety of emotions management skills in daily life, Demonstrated understanding and practice strategies to manage difficult emotions and move towards healing, Identified barriers to applying emotions management strategies and Identified strategies to overcome barriers to use of emotions management skills    Treatment Plan:  Patient has a current master individualized treatment plan.  See Epic treatment plan for more information.    Luh Hare LGSW

## 2023-03-21 NOTE — GROUP NOTE
Psychoeducation Group Note    PATIENT'S NAME: Randi Cleary  MRN:   [id number]  :   1955  ACCT. NUMBER: [id number]  DATE OF SERVICE: 3/21/23  START TIME: 10:00 AM  END TIME: 10:50 AM  FACILITATOR: Brittani Molina LICSW  TOPIC: MH Life Skills Group: Lifestyle Balance and Structure  Service Modality:  Video Visit     Telemedicine Visit: The patient's condition can be safely assessed and treated via synchronous audio and visual telemedicine encounter.       Reason for Telemedicine Visit: Services only offered telehealth and due to COVID-19.     Originating Site (Patient Location): Patient's home     Distant Site (Provider Location): Provider Remote Setting- Home Office     Consent:  The patient/guardian has verbally consented to: the potential risks and benefits of telemedicine (video visit) versus in person care; bill my insurance or make self-payment for services provided; and responsibility for payment of non-covered services.      Patient would like the video invitation sent by:  My Chart     Mode of Communication:  Video Conference via Medical Zoom     As the provider I attest to compliance with applicable laws and regulations related to telemedicine.    [facility] 55+ Program  TRACK: A1    NUMBER OF PARTICIPANTS: 7    Summary of Group / Topics Discussed:  Lifestyle Balance and Strucure:  Time Management: Time for Tips and Tips for Time: Patients were introduced to how effective time management is beneficial to self esteem, relationships with others, life balance, and other aspects of daily life.   Patients were taught strategies on how to improve time management skills.    Patient Session Goals / Objectives:    Facilitated the discussion on challenges/barriers and personal situations with time management     Identified specific techniques and strategies to improve time management to support mental health recovery       Identified a plan to implement strategies into daily life to support  improved time management       Patient Participation / Response:  Fully participated with the group by sharing personal reflections / insights and openly received / provided feedback with other participants.    Verbalized understanding of content    Treatment Plan:  Patient has a current master individualized treatment plan.  See Epic treatment plan for more information.    Brittani Molina, LICSW

## 2023-03-21 NOTE — GROUP NOTE
Process Group Note    PATIENT'S NAME: Randi Cleary  MRN:   [id number]  :   1955  ACCT. NUMBER: [id number]  DATE OF SERVICE: 3/21/23  START TIME:  9:00 AM  END TIME:  9:50 AM  FACILITATOR: Brittani Molina [facility]  TOPIC:  Process Group    Diagnoses:   296.33 (F33.2) Major Depressive Disorder, Recurrent Episode, Severe With anxious distress  300.02 (F41.1) Generalized Anxiety Disorder    Rule out:  296.89 Bipolar II Disorder vs. 314.01 (F90.2) Attention-Deficit/Hyperactivity Disorder   Combined presentation         Service Modality:  Video Visit     Telemedicine Visit: The patient's condition can be safely assessed and treated via synchronous audio and visual telemedicine encounter.       Reason for Telemedicine Visit: Services only offered telehealth and due to COVID-19.     Originating Site (Patient Location): Patient's home     Distant Site (Provider Location): Provider Remote Setting- Home Office     Consent:  The patient/guardian has verbally consented to: the potential risks and benefits of telemedicine (video visit) versus in person care; bill my insurance or make self-payment for services provided; and responsibility for payment of non-covered services.      Patient would like the video invitation sent by:  My Chart     Mode of Communication:  Video Conference via Medical Zoom     As the provider I attest to compliance with applicable laws and regulations related to telemedicine.    [facility] 55+ Program  TRACK: A1    NUMBER OF PARTICIPANTS: 7          Data:    Session content: At the start of this group, patients were invited to check in by identifying themselves, describing their current emotional status, and identifying issues to address in this group.   Area(s) of treatment focus addressed in this session included Symptom Management, Personal Safety, Community Resources/Discharge Planning, Abstinence/Relapse Prevention, Develop / Improve Independent Living Skills, Develop  Socialization / Interpersonal Relationship Skills and Physical Health .    Winnie reports depressed and anxious mood with panic attacks. Denies S/I or safety issues. Winnie processed having co-morbid physical health issues impacting her functioning level. She reported having a disability since 1998 and symptoms moving to a chronic stage. She is using coping skills for symptom management. She has enrolled her  into her support system and is focused on working on the   Relationship. She reported on medical bills which has caused financial stressors.    Therapeutic Interventions/Treatment Strategies:  Psychotherapist offered support, feedback and validation and reinforced use of skills. Treatment modalities used include Cognitive Behavioral Therapy.    Assessment:    Patient response:   Patient responded to session by accepting feedback, giving feedback, listening, focusing on goals, being attentive, accepting support and verbalizing understanding    Possible barriers to participation / learning include: and no barriers identified    Health Issues:   Yes: Pain, Associated Psychological Distress  Sleep disturbance, Associated Psychological Distress  Chronic disease management, Associated Psychological Distress     Medication compliant.       Substance Use Review:   Substance Use: No active concerns identified.    Mental Status/Behavioral Observations  Appearance:   Appropriate   Eye Contact:   Good   Psychomotor Behavior: Normal   Attitude:   Cooperative  Interested Pleasant  Orientation:   All  Speech   Rate / Production: Normal    Volume:  Normal   Mood:    Anxious  Depressed   Affect:    Appropriate   Thought Content:   Clear and Safety denies any current safety concerns including suicidal ideation, self-harm, and homicidal ideation  Thought Form:  Coherent  Goal Directed  Logical     Insight:    Good     Plan:     Safety Plan: No current safety concerns identified.  Recommended that patient call 911 or go to the  local ED should there be a change in any of these risk factors.     Barriers to treatment: None identified    Patient Contracts (see media tab):  None    Substance Use: Not addressed in session     Continue or Discharge: Patient will continue in 55+ Program (55+) as planned. Patient is likely to benefit from learning and using skills as they work toward the goals identified in their treatment plan. Winnie will continue for mood stabilization and symptom management education for mental health recovery.      Brittani Molina, [facility] HospitalSW  March 21, 2023

## 2023-03-22 ENCOUNTER — HOSPITAL ENCOUNTER (OUTPATIENT)
Dept: BEHAVIORAL HEALTH | Facility: CLINIC | Age: 68
Discharge: HOME | End: 2023-03-22
Attending: PSYCHIATRY & NEUROLOGY
Payer: MEDICARE

## 2023-03-22 PROCEDURE — 90853 GROUP PSYCHOTHERAPY: CPT | Mod: GT

## 2023-03-22 PROCEDURE — 90853 GROUP PSYCHOTHERAPY: CPT | Mod: GT,PO | Performed by: SOCIAL WORKER

## 2023-03-22 NOTE — GROUP NOTE
Psychotherapy Group Note    PATIENT'S NAME: Randi Cleary  MRN:   [id number]  :   1955  ACCT. NUMBER: [id number]  DATE OF SERVICE: 3/22/23  START TIME: 11:00 AM  END TIME: 11:50 AM  FACILITATOR: Luh Hare LGSW  TOPIC: MH EBP Group: Emotions Management  [facility]+ Program  TRACK: A1    NUMBER OF PARTICIPANTS: 7    Summary of Group / Topics Discussed:  Emotions Management: Check Facts: Patients participated in an interactive approach to identifying and challenging cognitive distortions that arise following an event that triggers intense emotion.  Patients choose an emotional reaction/event to work on.  The group shared their experiences and thought processes for feedback.      Patient Session Goals / Objectives:    Learn the process of identifying thoughts associated with the situation and reaction    Learn to challenge cognitive distortions and reframe the situation/event/reaction     Distinguish between facts, feelings, thoughts    Gain understanding of how to interpret an emotional reaction    Practice identification of cognitive distortions and evaluating an emotionally charged situation more rationally/objectively/mindfully                                      Service Modality:  Video Visit     Telemedicine Visit: The patient's condition can be safely assessed and treated via synchronous audio and visual telemedicine encounter.      Reason for Telemedicine Visit: Services only offered telehealth    Originating Site (Patient Location): Patient's home    Distant Site (Provider Location): Provider Remote Setting- Home Office    Consent:  The patient/guardian has verbally consented to: the potential risks and benefits of telemedicine (video visit) versus in person care; bill my insurance or make self-payment for services provided; and responsibility for payment of non-covered services.     Patient would like the video invitation sent by:  My Chart    Mode of Communication:  Video Conference  via Medical Zoom    As the provider I attest to compliance with applicable laws and regulations related to telemedicine.                               Patient Participation / Response:  Fully participated with the group by sharing personal reflections / insights and openly received / provided feedback with other participants.    Demonstrated understanding of topics discussed through group discussion and participation, Expressed understanding of the relevance / importance of emotions management skills at distressing times in life, Self-aware of experiences with difficult emotions, and strategies to employ to manage them and Identified barriers to applying emotions management strategies    Treatment Plan:  Patient has a current master individualized treatment plan.  See Epic treatment plan for more information.    Luh Hare LGSW

## 2023-03-22 NOTE — GROUP NOTE
Process Group Note    PATIENT'S NAME: Randi Cleary  MRN:   [id number]  :   1955  ACCT. NUMBER: [id number]  DATE OF SERVICE: 3/22/23  START TIME:  9:00 AM  END TIME:  9:50 AM  FACILITATOR: Brittani Molina [facility]  TOPIC:  Process Group    Diagnoses:   296.33 (F33.2) Major Depressive Disorder, Recurrent Episode, Severe With anxious distress  300.02 (F41.1) Generalized Anxiety Disorder    Rule out:  296.89 Bipolar II Disorder vs. 314.01 (F90.2) Attention-Deficit/Hyperactivity Disorder   Combined presentation         Service Modality:  Video Visit     Telemedicine Visit: The patient's condition can be safely assessed and treated via synchronous audio and visual telemedicine encounter.       Reason for Telemedicine Visit: Services only offered telehealth and due to COVID-19.     Originating Site (Patient Location): Patient's home     Distant Site (Provider Location): Provider Remote Setting- Home Office     Consent:  The patient/guardian has verbally consented to: the potential risks and benefits of telemedicine (video visit) versus in person care; bill my insurance or make self-payment for services provided; and responsibility for payment of non-covered services.      Patient would like the video invitation sent by:  My Chart     Mode of Communication:  Video Conference via Medical Zoom     As the provider I attest to compliance with applicable laws and regulations related to telemedicine.    [facility] 55+ Program  TRACK: A1    NUMBER OF PARTICIPANTS: 6          Data:    Session content: At the start of this group, patients were invited to check in by identifying themselves, describing their current emotional status, and identifying issues to address in this group.   Area(s) of treatment focus addressed in this session included Symptom Management, Personal Safety, Community Resources/Discharge Planning, Abstinence/Relapse Prevention, Develop / Improve Independent Living Skills, Develop  Socialization / Interpersonal Relationship Skills and Physical Health .    Winnie reports depressed and anxious mood with intermittent panic. Denies S/I or safety issues. Her  is home this week from work. The goal today is focusing on finances. Winnie reports stress with health care system, the customer service phone maze and medical bills. She is using coping skills for symptom management. Winnie continues to deal with co-morbid physical health issues.      Therapeutic Interventions/Treatment Strategies:  Psychotherapist offered support, feedback and validation and reinforced use of skills. Treatment modalities used include Cognitive Behavioral Therapy.    Assessment:    Patient response:   Patient responded to session by accepting feedback, giving feedback, listening, focusing on goals, being attentive, accepting support and verbalizing understanding    Possible barriers to participation / learning include: and no barriers identified    Health Issues:   Yes: Pain, Associated Psychological Distress  Chronic disease management, Associated Psychological Distress       Substance Use Review:   Substance Use: No active concerns identified.    Mental Status/Behavioral Observations  Appearance:   Appropriate   Eye Contact:   Good   Psychomotor Behavior: Normal   Attitude:   Cooperative  Interested  Orientation:   All  Speech   Rate / Production: Normal    Volume:  Normal   Mood:    Anxious  Depressed   Affect:    Appropriate  Worrisome   Thought Content:   Clear and Safety denies any current safety concerns including suicidal ideation, self-harm, and homicidal ideation  Thought Form:  Coherent  Goal Directed  Logical     Insight:    Good     Plan:     Safety Plan: No current safety concerns identified.  Recommended that patient call 911 or go to the local ED should there be a change in any of these risk factors.     Barriers to treatment: None identified    Patient Contracts (see media tab):  None    Substance Use: Not  addressed in session     Continue or Discharge: Patient will continue in 55+ Program (55+) as planned. Patient is likely to benefit from learning and using skills as they work toward the goals identified in their treatment plan.      Brittani Molina, [facility]  March 22, 2023

## 2023-03-22 NOTE — GROUP NOTE
Psychotherapy Group Note    PATIENT'S NAME: Randi Cleary  MRN:   [id number]  :   1955  ACCT. NUMBER: [id number]  DATE OF SERVICE: 3/22/23  START TIME: 10:00 AM  END TIME: 10:50 AM  FACILITATOR: Brittani Molina LICSW  TOPIC: MH EBP Group: Behavioral Activation  Service Modality:  Video Visit     Telemedicine Visit: The patient's condition can be safely assessed and treated via synchronous audio and visual telemedicine encounter.       Reason for Telemedicine Visit: Services only offered telehealth and due to COVID-19.     Originating Site (Patient Location): Patient's home     Distant Site (Provider Location): Provider Remote Setting- Home Office     Consent:  The patient/guardian has verbally consented to: the potential risks and benefits of telemedicine (video visit) versus in person care; bill my insurance or make self-payment for services provided; and responsibility for payment of non-covered services.      Patient would like the video invitation sent by:  My Chart     Mode of Communication:  Video Conference via Medical Zoom     As the provider I attest to compliance with applicable laws and regulations related to telemedicine.     [employer] [place] 55+ Program  TRACK: A1    NUMBER OF PARTICIPANTS: 6    Summary of Group / Topics Discussed:  Behavioral Activation: Behavior Chain Analysis: Patients explored the steps leading up to identified unwanted behaviors, and ways to replace them with more effective behaviors. Patients examined factors contributing to unwanted behaviors, with a goal of determining ways to interrupt the unhealthy patterns.    Patient Session Goals / Objectives:    Identify thoughts, feelings, and actions that contribute to unwanted behaviors    Identify thoughts, feelings, and actions that contribute to more effective/healthy and desired behaviors    Make plans to track and implement changes and share experiences in group    Identify personal barriers to change      Patient  Participation / Response:  Fully participated with the group by sharing personal reflections / insights and openly received / provided feedback with other participants.    Demonstrated understanding of topics discussed through group discussion and participation and Expressed understanding of the relationship between behaviors, thoughts, and feelings    Treatment Plan:  Patient has a current master individualized treatment plan.  See Epic treatment plan for more information.    Brittani Molina, LICSW

## 2023-03-23 ENCOUNTER — VIRTUAL VISIT (OUTPATIENT)
Dept: PSYCHOLOGY | Facility: CLINIC | Age: 68
End: 2023-03-23
Payer: MEDICARE

## 2023-03-23 ENCOUNTER — HOSPITAL ENCOUNTER (OUTPATIENT)
Dept: BEHAVIORAL HEALTH | Facility: CLINIC | Age: 68
Discharge: HOME | End: 2023-03-23
Attending: PSYCHIATRY & NEUROLOGY
Payer: MEDICARE

## 2023-03-23 DIAGNOSIS — F31.81 BIPOLAR 2 DISORDER (H): Primary | ICD-10-CM

## 2023-03-23 DIAGNOSIS — F41.1 GENERALIZED ANXIETY DISORDER: ICD-10-CM

## 2023-03-23 DIAGNOSIS — F43.9 TRAUMA AND STRESSOR-RELATED DISORDER: ICD-10-CM

## 2023-03-23 PROCEDURE — 90834 PSYTX W PT 45 MINUTES: CPT | Mod: VID | Performed by: SOCIAL WORKER

## 2023-03-23 PROCEDURE — 99215 OFFICE O/P EST HI 40 MIN: CPT | Mod: VID | Performed by: PSYCHIATRY & NEUROLOGY

## 2023-03-23 NOTE — PROGRESS NOTES
[facility]                                     Progress Note    Patient Name: Randi Cleary  Date: 3/23/23         Service Type: Individual     Session Start Time: 8:36 AM  Session End Time: 9:22 AM     Session Length: 46 minutes    Session #: 169    Attendees: Client attended alone    Service Modality:  Video Visit:      Provider verified identity through the following two step process.  Patient provided:  Patient is known previously to provider    Telemedicine Visit: The patient's condition can be safely assessed and treated via synchronous audio and visual telemedicine encounter.      Reason for Telemedicine Visit: Patient convenience (e.g. access to timely appointments / distance to available provider)    Originating Site (Patient Location): Patient's home    Distant Site (Provider Location): [facility]    Consent:  The patient/guardian has verbally consented to: the potential risks and benefits of telemedicine (video visit) versus in person care; bill my insurance or make self-payment for services provided; and responsibility for payment of non-covered services.     Patient would like the video invitation sent by:  My Chart    Mode of Communication:  [facility] Conference [facility]    Distant Location (Provider):  On-site    As the provider I attest to compliance with applicable laws and regulations related to telemedicine.    DATA  Extended Session (53+ minutes): PROLONGED SERVICE IN THE OUTPATIENT SETTING REQUIRING DIRECT (FACE-TO-FACE) PATIENT CONTACT BEYOND THE USUAL SERVICE:    - High distress and under complex circumstances.  See Data section for details  Interactive Complexity: No  Crisis: No        Progress Since Last Session (Related to Symptoms / Goals / Homework):   Symptoms: Doing better today than earlier in the week    Homework: Progress made  Next session talk about family and continue with childhood memories    Decide next plan of action for  your health  Keep getting out in the community    In the future:  Look at a budget  Get back to the pool  Talk to your primary care doctor about what's on your list (mood, neck, arthritis)         Episode of Care Goals: Satisfactory progress - ACTION (Actively working towards change); Intervened by reinforcing change plan / affirming steps taken     Current / Ongoing Stressors and Concerns:   Patient is currently socially isolated. She has a conflictual relationship with her .  She is getting minimal physical activity.  She has had several surgeries.      Treatment Objective(s) Addressed in This Session:   Patient will increase frequency of engaging her in ADLs.  Patient will track and record at least 5 pleasant exchanges with . Patient will be able to identify at least 5 positive traits about her .  Patient will reduce level of depressive and anxious features as evidenced by reduction in score on her CHAVO-7 and PHQ-9 (scores of 15 and 16 at first measurment, respectively).     Intervention:   Supportive Therapy:  Processed medical billing concerns and how she's navigating them. Talked about how she's started to notice some of her triggers and what she is going to do about them. Also discussed her option of getting her medical health evaluated at [place] as she feels her medical and mental health are so intertwined and she's struggling.      The following assessments were completed by patient for this visit:  PHQ9:   PHQ-9 SCORE 1/11/2023 1/30/2023 2/5/2023 2/14/2023 2/27/2023 3/13/2023 3/20/2023   PHQ-9 Total Score MyChart 15 (Moderately severe depression) 13 (Moderate depression) 18 (Moderately severe depression) 18 (Moderately severe depression) 20 (Severe depression) 16 (Moderately severe depression) 15 (Moderately severe depression)   PHQ-9 Total Score 15 13 18 18 20 16 15   Some encounter information is confidential and restricted. Go to Review Flowsheets activity to see all data.     GAD7:    CHAVO-7 SCORE 2/20/2023 2/20/2023 2/20/2023 2/20/2023 3/9/2023 3/9/2023 3/9/2023   Total Score - - - 17 (severe anxiety) - - 15 (severe anxiety)   Total Score 17 17 17 17 15 15 15   Some encounter information is confidential and restricted. Go to Review Flowsheets activity to see all data.     PROMIS 10-Global Health (only subscores and total score):   PROMIS-10 Scores Only 2/5/2023 2/5/2023 2/5/2023 2/5/2023 3/9/2023 3/9/2023 3/9/2023   Global Mental Health Score 5 5 5 5 6 6 6   Global Physical Health Score 8 8 8 8 6 6 6   PROMIS TOTAL - SUBSCORES 13 13 13 13 12 12 12   Some encounter information is confidential and restricted. Go to Review Flowsheets activity to see all data.        ASSESSMENT: Current Emotional / Mental Status (status of significant symptoms):   Risk status (Self / Other harm or suicidal ideation)   Patient denies current fears or concerns for personal safety.   Patient denies current or recent suicidal ideation or behaviors.   Patient denies current or recent homicidal ideation or behaviors.   Patient denies current or recent self injurious behavior or ideation.   Patient denies other safety concerns.   Patient reports there has been no change in risk factors since their last session.     Patient reports there has been no change in protective factors since their last session.     A safety and risk management plan has been developed including: Patient consented to co-developed safety plan on 11/24/2020, updated 2/20/23.  Safety and risk management plan was reviewed.   Patient agreed to use safety plan should any safety concerns arise.  A copy was made available to the patient.     Appearance:   Appropriate    Eye Contact:   Good    Psychomotor Behavior: Normal    Attitude:   Cooperative    Orientation:   All   Speech    Rate / Production: Normal/ Responsive    Volume:  Normal    Mood:    Normal   Affect:    Appropriate    Thought Content:  Clear    Thought Form:  Coherent    Insight:    Good       Medication Review:   No changes to current psychiatric medication(s)     Medication Compliance:   Yes     Changes in Health Issues:   None reported     Chemical Use Review:   Substance Use: Chemical use reviewed, no active concerns identified Nothing used since 2021.     Tobacco Use: No current tobacco use.      Diagnosis:  1. Bipolar 2 disorder (H)    2. Generalized anxiety disorder    3. Trauma and stressor-related disorder      Collateral Reports Completed:   Not Applicable    PLAN: (Patient Tasks / Therapist Tasks / Other)  Next session talk about family and continue with childhood memories    Decide next plan of action for your health  Keep getting out in the community    In the future:  Look at a budget  Get back to the pool  Talk to your primary care doctor about what's on your list (mood, neck, arthritis)        There has been demonstrated improvement in functioning while patient has been engaged in psychotherapy/psychological service- if withdrawn the patient would deteriorate and/or relapse.     MICAELA SLADE   2023                                                        ______________________________________________________________________    Individual Treatment Plan    Patient's Name: Randi Cleary  YOB: 1955    Date of Creation: 20  Date Treatment Plan Last Reviewed/Revised: 3/2/23    DSM5 Diagnoses: 296.89 Bipolar II Disorder Depressed, 300.02 (F41.1) Generalized Anxiety Disorder or Adjustment Disorders  309.89 (F43.8) Other Specified Trauma and Stressor Related Disorder  Psychosocial / Contextual Factors: Patient's entire family of origin has , she now has a sister-in-law and  as support.  Relationship with  is conflictual. She is recovering from surgeries  PROMIS (reviewed every 90 days): PROMIS-10 Scores  PROMIS 10-Global Health (only subscores and total score):   PROMIS-10 Scores Only 2021 3/15/2022 3/15/2022 3/15/2022  "3/24/2022 4/1/2022 6/9/2022   Global Mental Health Score 6 6 6 6 8 12 12   Global Physical Health Score 9 9 9 9 8 11 10   PROMIS TOTAL - SUBSCORES 15 15 15 15 16 23 22       Referral / Collaboration:  Referral to another professional/service is not indicated at this time..    Anticipated number of session for this episode of care: 50  Anticipation frequency of session: Biweekly  Anticipated Duration of each session: 53 or more minutes due to intensity of trauma symptoms  Treatment plan will be reviewed in 90 days or when goals have been changed.   There has been demonstrated improvement in functioning while patient has been engaged in psychotherapy/psychological service- if withdrawn the patient would deteriorate and/or relapse.       MeasurableTreatment Goal(s) related to diagnosis / functional impairment(s)  Goal 1: Patient will \"jumpstart, getting going with the things I need to be doing around the house as far as picking up, doing things, trying to do something every day.  Also to lessen the animosity between me and my .\"    I will know I've met my goal when my shoulders are fixed and I can see.      Objective #A (Patient Action)    Patient will increase frequency of engaging her in ADLs.  Status: Continued - Date(s): 12/10/21, 3/9/22, 6/9/22, 9/2/22, 12/1/22, 3/2/23    Intervention(s)  Therapist will engage patient in CBT, specifically behavioral activation.    Objective #B  Patient will  track and record at least 5 pleasant exchanges with . Patient will be able to identify at least 5 positive traits about her  and how he relates to her.  Status: Continued - Date(s): 12/10/21, 3/9/22, 6/9/22, 9/2/22, 12/1/22, 3/2/23    Intervention(s)  Therapist will teach assertiveness skills and assign homework related to relationship interactions.    Objective #C  Patient will reduce level of depressive and anxious features as evidenced by reduction in score on her CHAVO-7 and PHQ-9 (scores of 15 and 16 at " "first measurment, respectively).  Status: Continued - Date(s): 12/10/21, 3/9/22, 6/9/22, 9/2/22, 12/1/22, 3/2/23    Intervention(s)  Therapist will engage patient in person-centered therapy and CBT.    Patient has reviewed and agreed to the above plan.      CRUZ BAKER COLBY  March 2, 2023                                                   Randi Cleary          SAFETY PLAN:  Step 1: Warning signs / cues (Thoughts, images, mood, situation, behavior) that a crisis may be developing:  ? Thoughts: \"I don't want to continue\" \"I am unwanted\"  ? Images: none  ? Thinking Processes: ruminating  ? Mood: anger  ? Behaviors: isolating/withdrawing , can't stop crying, not taking care of myself and not taking care of my responsibilities  ? Situations: small triggers, such as not being able to find something, or dropping something   Step 2: Coping strategies - Things I can do to take my mind off of my problems without contacting another person (relaxation technique, physical activity):  ? Distress Tolerance Strategies:  arts and crafts: drawing, play with my pet , listen to positive and upbeat music: any, change body temperature (ice pack/cold water)  and paced breathing/progressive muscle relaxation  ? Physical Activities: go for a walk, deep breathing and stretching   ? Focus on helpful thoughts:  \"You've been through this before, you can get through it again.\"  Step 3: People and social settings that provide distraction:                 Name: Carmen                            Name: Darien                           Name: Aleida       ? pool, shopping, Carmen's house, [employer]       Step 4: Remind myself of people and things that are important to me and worth living for:  Clifford Little Donna, post-COVID world, options of what could be in your future        Step 5: When I am in crisis, I can ask these people to help me use my safety plan:                 Name: Sidney  Step 6: Making the environment safe:   ? go to " sleep/daydream  Step 7: Professionals or agencies I can contact during a crisis:  ? [facility] Daytime Number: [phone]  ? Suicide Prevention Lifeline: 1-177-114-JZDM (3354)  ? Crisis Text Line Service (available 24 hours a day, 7 days a week): Text [place] to 682900    Local Crisis Services: [facility] Crisis: [phone]  Adults can always access to the emPATH unit at [facility] (no phone number, utilize it like an urgent care or ER where you just show up)     Call 911 or go to my nearest emergency department.       I helped develop this safety plan and agree to use it when needed.  I have been given a copy of this plan.       Client signature _________________________________________________________________  Today s date:  11/24/2020  Adapted from Safety Plan Template 2008 Randi Poole and Robby Barba is reprinted with the express permission of the authors.  No portion of the Safety Plan Template may be reproduced without the express, written permission.  You can contact the authors at [email or web address] or [email or web address].

## 2023-03-23 NOTE — PROGRESS NOTES
"[facility] Mental Health Outpatient Programs  Provider Interval History Note    Program (track): Intensive Outpatient Program (track A1, 55+)    PATIENT'S NAME: Randi Cleary  MRN:   [id number]  :   1955  ACCT. NUMBER: [id number]  DATE OF SERVICE: 3/23/23  CALL/VIDEO START TIME: 933  CALL/VIDEO END TIME: 1010      Interval History:  \"I wanted to talk to you about where I go next.\" Winnie presents today for follow-up and ongoing program supervision.   Endorses:    \"The pheo came back negative\"    Had follow up scheduled with PCP  o \"I thought she would be on the same page, and she's not\"    \"Just seems like the lows are getting lower\"  o Recently called [facility] crisis line  o Struggling more with suicidal ideation recently    Discussed again that conerns about physical health are impacting mental health    Patient would like to pursue a second opinion/complete workup at [facility], has not called them yet to set that up    Substance use:    None endorsed    Reactions/thoughts about program:    \"It's been good to have the support\"      Risk Assessment:    Suicidal ideation: increase in passive ideation without stated plan or intent    Thoughts of non-suicidal self-injury: denied    Recent self-injurious behavior: denied    Homicidal ideation: denied    Other safety concerns: denied      Medications:  Current Outpatient Medications   Medication Sig Dispense Refill     acetaminophen (TYLENOL) 325 MG tablet Take 2 tablets (650 mg) by mouth every 4 hours as needed for other (For optimal non-opioid multimodal pain management to improve pain control.)       albuterol (PROVENTIL) (2.5 MG/3ML) 0.083% neb solution Take 1 vial (2.5 mg) by nebulization every 4 hours as needed for shortness of breath / dyspnea or wheezing 360 mL 5     albuterol (VENTOLIN HFA) 108 (90 Base) MCG/ACT inhaler Inhale 2 puffs into the lungs every 6 hours 18 g 11     Cholecalciferol (VITAMIN " D3) 250 MCG (13405 UT) TABS Take 1 tablet by mouth daily 20169/day       Cyanocobalamin (VITAMIN B-12) 5000 MCG SUBL Place 2-3 sprays under the tongue daily Unknown dose. 2 or 3 sprays/day       ethacrynic acid (EDECRIN) 25 MG tablet Take 1 tablet (25 mg) by mouth every other day 45 tablet 3     Fluticasone-Umeclidin-Vilanterol (TRELEGY ELLIPTA) 200-62.5-25 MCG/INH oral inhaler Inhale 1 puff into the lungs daily 4 each 3     HYDROcodone-acetaminophen (NORCO) 5-325 MG tablet Take 1 tablet by mouth every 6 hours as needed for breakthrough pain or moderate to severe pain May fill 2/27 for use 2/28 112 tablet 0     hydrOXYzine (ATARAX) 25 MG tablet Take 1 tablet by mouth daily as needed       KLOR-CON 20 MEQ CR tablet Take 1 tablet (20 mEq total) by mouth 2 (two) times a day. 180 tablet 0     LITHIUM PO Take 25 mg by mouth daily OTC lithium oratate       magnesium 250 MG tablet Take 1 tablet by mouth 2 times daily       medical cannabis (Patient's own supply) See Admin Instructions (The purpose of this order is to document that the patient reports taking medical cannabis.  This is not a prescription, and is not used to certify that the patient has a qualifying medical condition.)  Flower       methocarbamol (ROBAXIN) 500 MG tablet Take 1 tablet (500 mg) by mouth 3 times daily 90 tablet 3     omeprazole (PRILOSEC) 20 MG DR capsule Take 1 capsule (20 mg) by mouth daily 90 capsule 3     OXcarbazepine (TRILEPTAL) 150 MG tablet Take one tablet daily at bedtime 30 tablet 3     rOPINIRole (REQUIP) 2 MG tablet One tab 3 pm, one bedtime, and one during night on waking 90 tablet 3     SYNTHROID 150 MCG tablet Take 1 tablet (150 mcg) by mouth daily 90 tablet 4     vitamin E 400 units TABS Take 800 Units by mouth daily         The above list was discussed briefly with patient today.     Patient is taking medications as prescribed and denies adverse effects      Laboratory Results:  Most recent labs reviewed. Pertinent  "updates/findings: None.     Metrics:  PHQ-9 scores:   PHQ-9 SCORE 2/14/2023 2/27/2023 3/13/2023 3/20/2023   PHQ-9 Total Score MyChart 18 (Moderately severe depression) 20 (Severe depression) 16 (Moderately severe depression) 15 (Moderately severe depression)   PHQ-9 Total Score 18 20 16 15       CHAVO-7 scores:   CHAVO-7 SCORE 3/9/2023 3/9/2023 3/9/2023   Total Score - - 15 (severe anxiety)   Total Score 15 15 15       CSSR-S: No flowsheet data found.      Mental Status Examination (limited due to video virtual visit format):  Vital Signs: There were no vitals taken for this virtual visit.  Appearance: appropriately groomed, appears stated age, and in moderate distress.  Attitude: cooperative   Eye Contact: good to the extent that can be determined in a video visit  Muscle Strength and Tone: no gross abnormalities based on remote observation  Psychomotor Behavior: Appropriate and Restless; no evidence of tardive dyskinesia, dystonia, or tics based on remote observation  Gait and Station: normal, no gross abnormalities based on remote observation  Speech: normal rate, production, volume, and rhythm of  Associations: No loosening of associations  Thought Process: coherent and goal directed  Thought Content: no evidence of psychotic thought, passive suicidal ideation present, no auditory hallucinations present and no visual hallucinations present  Mood: \"anxious and depressed\"  Affect: mood congruent, intensity is blunted, constricted mobility and reactive  Insight: good  Judgment: intact, adequate for safety  Impulse Control: intact  Oriented to: time, place, person and situation  Attention Span and Concentration: normal  Language: Intact  Recent and Remote Memory: intact to interview. Not formally assessed. No amnesia.  Fund of Knowledge/Assessment of Intelligence: Average  Capacity of Activities of Daily Living: Independent, able to participate in programmatic care services.      Diagnosis/es:    ICD-10-CM    1. Bipolar " 2 disorder (H)  F31.81       2. Generalized anxiety disorder  F41.1             Assessment/Plan:  Winnie presents today for follow up. Endorses stress related to somatic illness(es) continues to exacerbate marital distress and is not contributing to an increase in suicidal ideation. As discussed with patient and (at her request) , if a medical second opinion will help relieve distress, then it is my explicit medical advice that this patient seek out that second opinion. Patient plans to call the [facility] to explore further work-up.    In keeping with that treatment focus, we will not make any additional changes to psychotropic medications today.    Increase in suicidal ideation and ongoing stressors means more time in the intensive outpatient program could offer additional support and comfort to the patient. I therefore recommend she continue with program. If, however, other medical appointments would interfere with meaningful participation in the program, then we can explore discharge and possible readmission in the future depending on clinical need. Patient's first step is to schedule appointments and start the conversation so we will continue with the intensive outpatient program for now.      Bipolar disorder/depression  o Overall worse   o Exacerbated by illness and anxiety about illness  o No changes to medication today  o Continue intensive outpatient  o Other interventions as discussed above      Anxiety  o Overall worse  o See above'    Continue all other medications as reviewed per electronic medical record today    Continue therapy as planned:    Enrolled in intensive outpatient    Continues to benefit from services    Continues to meet criteria for this level of care    Patient would be at reasonable risk of requiring a higher level of care in the absence of current services.    I feel this patient does not meet criteria for an involuntary hold and is appropriate for treatment at an outpatient  level of care.    Continue with individual therapist as appropriate    Safety plan reviewed:    To the Emergency Department as needed or call after hours crisis line at [phone] or [phone]. [place] Crisis Text Line: Text [place] to 803283 or Suicide LifeLine Chat: suicidePin or Peg.Cinedigm/chat    Follow-up:     schedule an appointment with me or another program provider in approximately in 4 week(s) or sooner if needed.  Can speak with a staff member or call the appropriate program number (see below) to schedule    Follow up with outpatient provider(s) as planned or sooner if needed for acute medical concerns.    Questions or concerns:    Call program line with questions or concerns (see below)    9GAGhart may be used to communicate with your provider, but this is not intended to be used for emergencies.      [facility] Adult [facility] Program lines:  [facility]: [phone]  Dual Disorder: [phone]  Adult Day Treatment:  [phone]  55+/Intensive Outpatient: [phone]    Community Resources:    National Suicide Prevention Lifeline: 988 from any phone, or 132-031-6923 (TTY: 666.269.4405). Call anytime for help.  (www.suicidepreventionlifeline.org)  National [place] on Mental Illness (www.mary.org): 917.510.7234 or 555-003-5647.   Mental Health Association (www.mentalhealth.org): 285.649.8446 or 204-653-3516.  [place] Crisis Text Line: Text [place] to 538212  Suicide LifeLine Chat: suicidePin or Peg.org/chat    Treatment Objective(s) Addressed in This Session:  The purpose of today's virtual visit is for this writer to provide oversight of patient's care while receiving program services. Specific treatment goals addressed included personal safety, symptoms stabilization and management, wellness and mental health, and community resources/discharge planning.     This author or another program provider will follow up with the patient as noted above.     Patient agrees with the  current plan of care.    Roland Das MD  3/23/23      Visit Details:  Type of service:  Video Visit    Start/End Time: see above    Originating Location (pt. Location): Home in [place]    Distant Location (provider location): [facility] Outpatient Setting: [facility]    Platform used for Video Visit: Tara    Physician has received verbal consent for a Video Visit from the patient? Yes    55 minutes spent on the date of the encounter doing chart review, patient visit, documentation and discussion with other provider(s)     This document completed in part using Dragon Medical One dictation software.  Please excuse any inadvertent word or phrase substitutions.

## 2023-03-24 ENCOUNTER — HOSPITAL ENCOUNTER (OUTPATIENT)
Dept: BEHAVIORAL HEALTH | Facility: CLINIC | Age: 68
Discharge: HOME | End: 2023-03-24
Attending: PSYCHIATRY & NEUROLOGY
Payer: MEDICARE

## 2023-03-24 PROCEDURE — 90853 GROUP PSYCHOTHERAPY: CPT | Mod: GT

## 2023-03-24 PROCEDURE — 90853 GROUP PSYCHOTHERAPY: CPT | Mod: GT,PO | Performed by: SOCIAL WORKER

## 2023-03-24 ASSESSMENT — ANXIETY QUESTIONNAIRES
5. BEING SO RESTLESS THAT IT IS HARD TO SIT STILL: SEVERAL DAYS
7. FEELING AFRAID AS IF SOMETHING AWFUL MIGHT HAPPEN: MORE THAN HALF THE DAYS
GAD7 TOTAL SCORE: 15
1. FEELING NERVOUS, ANXIOUS, OR ON EDGE: NEARLY EVERY DAY
GAD7 TOTAL SCORE: 15
5. BEING SO RESTLESS THAT IT IS HARD TO SIT STILL: SEVERAL DAYS
2. NOT BEING ABLE TO STOP OR CONTROL WORRYING: MORE THAN HALF THE DAYS
4. TROUBLE RELAXING: NEARLY EVERY DAY
IF YOU CHECKED OFF ANY PROBLEMS ON THIS QUESTIONNAIRE, HOW DIFFICULT HAVE THESE PROBLEMS MADE IT FOR YOU TO DO YOUR WORK, TAKE CARE OF THINGS AT HOME, OR GET ALONG WITH OTHER PEOPLE: EXTREMELY DIFFICULT
7. FEELING AFRAID AS IF SOMETHING AWFUL MIGHT HAPPEN: MORE THAN HALF THE DAYS
GAD7 TOTAL SCORE: 15
7. FEELING AFRAID AS IF SOMETHING AWFUL MIGHT HAPPEN: MORE THAN HALF THE DAYS
6. BECOMING EASILY ANNOYED OR IRRITABLE: NEARLY EVERY DAY
GAD7 TOTAL SCORE: 15
2. NOT BEING ABLE TO STOP OR CONTROL WORRYING: MORE THAN HALF THE DAYS
2. NOT BEING ABLE TO STOP OR CONTROL WORRYING: MORE THAN HALF THE DAYS
8. IF YOU CHECKED OFF ANY PROBLEMS, HOW DIFFICULT HAVE THESE MADE IT FOR YOU TO DO YOUR WORK, TAKE CARE OF THINGS AT HOME, OR GET ALONG WITH OTHER PEOPLE?: EXTREMELY DIFFICULT
3. WORRYING TOO MUCH ABOUT DIFFERENT THINGS: SEVERAL DAYS
6. BECOMING EASILY ANNOYED OR IRRITABLE: NEARLY EVERY DAY
8. IF YOU CHECKED OFF ANY PROBLEMS, HOW DIFFICULT HAVE THESE MADE IT FOR YOU TO DO YOUR WORK, TAKE CARE OF THINGS AT HOME, OR GET ALONG WITH OTHER PEOPLE?: EXTREMELY DIFFICULT
4. TROUBLE RELAXING: NEARLY EVERY DAY
3. WORRYING TOO MUCH ABOUT DIFFERENT THINGS: SEVERAL DAYS
6. BECOMING EASILY ANNOYED OR IRRITABLE: NEARLY EVERY DAY
GAD7 TOTAL SCORE: 15
IF YOU CHECKED OFF ANY PROBLEMS ON THIS QUESTIONNAIRE, HOW DIFFICULT HAVE THESE PROBLEMS MADE IT FOR YOU TO DO YOUR WORK, TAKE CARE OF THINGS AT HOME, OR GET ALONG WITH OTHER PEOPLE: EXTREMELY DIFFICULT
1. FEELING NERVOUS, ANXIOUS, OR ON EDGE: NEARLY EVERY DAY
GAD7 TOTAL SCORE: 15
7. FEELING AFRAID AS IF SOMETHING AWFUL MIGHT HAPPEN: MORE THAN HALF THE DAYS
7. FEELING AFRAID AS IF SOMETHING AWFUL MIGHT HAPPEN: MORE THAN HALF THE DAYS
1. FEELING NERVOUS, ANXIOUS, OR ON EDGE: NEARLY EVERY DAY
3. WORRYING TOO MUCH ABOUT DIFFERENT THINGS: SEVERAL DAYS
GAD7 TOTAL SCORE: 15
IF YOU CHECKED OFF ANY PROBLEMS ON THIS QUESTIONNAIRE, HOW DIFFICULT HAVE THESE PROBLEMS MADE IT FOR YOU TO DO YOUR WORK, TAKE CARE OF THINGS AT HOME, OR GET ALONG WITH OTHER PEOPLE: EXTREMELY DIFFICULT
7. FEELING AFRAID AS IF SOMETHING AWFUL MIGHT HAPPEN: MORE THAN HALF THE DAYS
4. TROUBLE RELAXING: NEARLY EVERY DAY
5. BEING SO RESTLESS THAT IT IS HARD TO SIT STILL: SEVERAL DAYS
8. IF YOU CHECKED OFF ANY PROBLEMS, HOW DIFFICULT HAVE THESE MADE IT FOR YOU TO DO YOUR WORK, TAKE CARE OF THINGS AT HOME, OR GET ALONG WITH OTHER PEOPLE?: EXTREMELY DIFFICULT

## 2023-03-24 NOTE — GROUP NOTE
Psychoeducation Group Note    PATIENT'S NAME: Randi Cleary  MRN:   [id number]  :   1955  ACCT. NUMBER: [id number]  DATE OF SERVICE: 3/24/23  START TIME: 11:00 AM  END TIME: 11:50 AM  FACILITATOR: Luh Hare LGSW  TOPIC:  Wellness Group: Health Maintenance  [facility] 55+ Program  TRACK: A1    NUMBER OF PARTICIPANTS: 7    Summary of Group / Topics Discussed:  Health Maintenance: Weekend planning: Patients were given time to complete a weekend plan of what they will do to promote wellness and sobriety over the weekend when they do not have the structure of group. Patients were encouraged to review progress on their treatment goals and were challenged to identify ways to work toward meeting them. Patients identified and discussed foreseeable barriers to success over the weekend and then developed a plan to overcome them. Patients reviewed their distress coping skills and social support network and discussed this with the group.       Patient Session Goals / Objectives:    ?    Identified activities to engage in that promote balance in wellness  ?    Distinguished possible barriers to success over the weekend and created a plan to overcome them  ?    Listed distress coping skills and identified social support network to utilize if in crisis during the weekend                                        Service Modality:  Video Visit     Telemedicine Visit: The patient's condition can be safely assessed and treated via synchronous audio and visual telemedicine encounter.      Reason for Telemedicine Visit: Services only offered telehealth    Originating Site (Patient Location): Patient's home    Distant Site (Provider Location): Provider Remote Setting- Home Office    Consent:  The patient/guardian has verbally consented to: the potential risks and benefits of telemedicine (video visit) versus in person care; bill my insurance or make self-payment for services provided; and responsibility for payment  of non-covered services.     Patient would like the video invitation sent by:  My Chart    Mode of Communication:  Video Conference via Medical Zoom    As the provider I attest to compliance with applicable laws and regulations related to telemedicine.                               Patient Participation / Response:  Fully participated with the group by sharing personal reflections / insights and openly received / provided feedback with other participants.    Demonstrated understanding of topics discussed through group discussion and participation, Identified / Expressed personal readiness to practice skills and Verbalized understanding of health maintenance topic    Treatment Plan:  Patient has a current master individualized treatment plan.  See Epic treatment plan for more information.    Luh Hare LGSW

## 2023-03-24 NOTE — GROUP NOTE
Psychotherapy Group Note    PATIENT'S NAME: Randi Cleary  MRN:   [id number]  :   1955  ACCT. NUMBER: [id number]  DATE OF SERVICE: 3/24/23  START TIME: 10:00 AM  END TIME: 10:50 AM  FACILITATOR: Brittani Molina LICSW  TOPIC: MH EBP Group: Coping Skills  Service Modality:  Video Visit     Telemedicine Visit: The patient's condition can be safely assessed and treated via synchronous audio and visual telemedicine encounter.       Reason for Telemedicine Visit: Services only offered telehealth and due to COVID-19.     Originating Site (Patient Location): Patient's home     Distant Site (Provider Location): Provider Remote Setting- Home Office     Consent:  The patient/guardian has verbally consented to: the potential risks and benefits of telemedicine (video visit) versus in person care; bill my insurance or make self-payment for services provided; and responsibility for payment of non-covered services.      Patient would like the video invitation sent by:  My Chart     Mode of Communication:  Video Conference via Medical Zoom     As the provider I attest to compliance with applicable laws and regulations related to telemedicine.     [facility] 55+ Program  TRACK: A1    NUMBER OF PARTICIPANTS: 7    Summary of Group / Topics Discussed:  Coping Skills: Improve the Moment: Patients learned to tolerate distress, by applying strategies to effect positive change in the present moment.  Patients will identified situations where they would benefit from applying strategies to improve the moment and reduce distress. Patients discussed how to distinguish when this can be useful in their lives or when other strategies would be more relevant or helpful.    Patient Session Goals / Objectives:    Discuss how the use of intentional  in the moment  actions can help reduce distress.    Review patients current practices and discuss a more formal way of practicing and accessing skills.    Increase ability to decide  when to use improve the moment strategies    Choose 1-2 in the moment actions to apply during times of distress.        Patient Participation / Response:  Fully participated with the group by sharing personal reflections / insights and openly received / provided feedback with other participants.    Demonstrated understanding of topics discussed through group discussion and participation and Practiced 2-3 new coping skills in session    Treatment Plan:  Patient has a current master individualized treatment plan.  See Epic treatment plan for more information.    Brittani Molina, LICSW

## 2023-03-24 NOTE — GROUP NOTE
Process Group Note    PATIENT'S NAME: Randi Cleary  MRN:   [id number]  :   1955  ACCT. NUMBER: [id number]  DATE OF SERVICE: 3/24/23  START TIME:  9:00 AM  END TIME:  9:50 AM  FACILITATOR: Brittani Molina [facility]  TOPIC:  Process Group    Diagnoses:   296.33 (F33.2) Major Depressive Disorder, Recurrent Episode, Severe With anxious distress  300.02 (F41.1) Generalized Anxiety Disorder    Rule out:  296.89 Bipolar II Disorder vs. 314.01 (F90.2) Attention-Deficit/Hyperactivity Disorder   Combined presentation         Service Modality:  Video Visit     Telemedicine Visit: The patient's condition can be safely assessed and treated via synchronous audio and visual telemedicine encounter.       Reason for Telemedicine Visit: Services only offered telehealth and due to COVID-19.     Originating Site (Patient Location): Patient's home     Distant Site (Provider Location): Provider Remote Setting- Home Office     Consent:  The patient/guardian has verbally consented to: the potential risks and benefits of telemedicine (video visit) versus in person care; bill my insurance or make self-payment for services provided; and responsibility for payment of non-covered services.      Patient would like the video invitation sent by:  My Chart     Mode of Communication:  Video Conference via Medical Zoom     As the provider I attest to compliance with applicable laws and regulations related to telemedicine.    [facility] 55+ Program  TRACK: A1    NUMBER OF PARTICIPANTS: 7          Data:    Session content: At the start of this group, patients were invited to check in by identifying themselves, describing their current emotional status, and identifying issues to address in this group.   Area(s) of treatment focus addressed in this session included Symptom Management, Personal Safety, Community Resources/Discharge Planning, Abstinence/Relapse Prevention, Develop / Improve Independent Living Skills, Develop  Socialization / Interpersonal Relationship Skills and Physical Health .    Winnie reports depressed and anxious mood with panic. Denies S/Ior safety issues. She reported on her pattern of mood symptoms and has a coping plan for symptom management. She reports feeling overwhelmed and presented with tearful affect. She had concerns about being about to go swimming at the pool today. She also seems stress about following up with the [facility]. Her goal is to call them on Monday. Her  continues to be home this week from work. They were able to complete finances together.  Therapeutic Interventions/Treatment Strategies:  Psychotherapist offered support, feedback and validation and reinforced use of skills. Treatment modalities used include Cognitive Behavioral Therapy.    Assessment:    Patient response:   Patient responded to session by accepting feedback, giving feedback, listening, focusing on goals, being attentive, accepting support and verbalizing understanding    Possible barriers to participation / learning include: and no barriers identified    Health Issues:   Yes: Pain, Associated Psychological Distress  Chronic disease management, Associated Psychological Distress     Med compliant.       Substance Use Review:   Substance Use: No active concerns identified.    Mental Status/Behavioral Observations  Appearance:   Appropriate   Eye Contact:   Good   Psychomotor Behavior: Normal   Attitude:   Cooperative  Interested  Orientation:   All  Speech   Rate / Production: Normal    Volume:  Normal   Mood:    Anxious  Depressed  Panicked  Affect:    Appropriate  Tearful Worrisome   Thought Content:   Clear and Safety denies any current safety concerns including suicidal ideation, self-harm, and homicidal ideation  Thought Form:  Coherent  Goal Directed  Logical     Insight:    Good     Plan:     Safety Plan: No current safety concerns identified.  Recommended that patient call 911 or go to the local ED should  there be a change in any of these risk factors.     Barriers to treatment: None identified    Patient Contracts (see media tab):  None    Substance Use: Not addressed in session     Continue or Discharge: Patient will continue in 55+ Program (55+) as planned. Patient is likely to benefit from learning and using skills as they work toward the goals identified in their treatment plan.      Brittani Molina, [facility]  March 24, 2023

## 2023-03-29 ENCOUNTER — TELEPHONE (OUTPATIENT)
Dept: BEHAVIORAL HEALTH | Facility: CLINIC | Age: 68
End: 2023-03-29
Payer: MEDICARE

## 2023-03-29 ENCOUNTER — HOSPITAL ENCOUNTER (OUTPATIENT)
Dept: BEHAVIORAL HEALTH | Facility: CLINIC | Age: 68
Discharge: HOME | End: 2023-03-29
Attending: PSYCHIATRY & NEUROLOGY
Payer: MEDICARE

## 2023-03-29 PROCEDURE — 90853 GROUP PSYCHOTHERAPY: CPT | Mod: GT

## 2023-03-29 NOTE — TELEPHONE ENCOUNTER
"Writer called client after she did not attend 55+ A1 group at 9am.  She reported depressed (\"I'm having a down day\") and anxious (\"nervous breakdown\") mood.  She expressed difficulty focusing on needs (\"I can't separate things to function\").  She shared feelings of frustration and anger towards unanswered questions she has about her mental and physical health for the past two years (\"I'm losing tigre in doctors; I'm falling through the cracks\").  She reported calling [facility] last Friday to ask for a second opinion on her neck and hands.  She reported them redirecting her to her providers in the [facility] instead.    Writer coached client on emotional regulation skills, including mindfulness and breaking things down into smaller parts. Client agreed to attend the 11am group which included mindfulness practice.  She was able to identify her preferred focus area which is communicating with her psychiatrist Dr. Gallardo regarding medications.  Writer encouraged client to write down her concerns, wants and needs to create a \"script\" for her next meeting with Dr. Dubois.  She feels like she needs to feel better mentally before she can continue exploring her physical health concerns.  She acknowledged some willfulness with going through medication trial and error process again.      ROSY Garcia on 3/29/2023 at 5:27 PM    "

## 2023-03-29 NOTE — GROUP NOTE
Psychotherapy Group Note    PATIENT'S NAME: Randi Cleary  MRN:   [id number]  :   1955  ACCT. NUMBER: [id number]  DATE OF SERVICE: 3/29/23  START TIME: 11:00 AM  END TIME: 11:50 AM  FACILITATOR: Luh Hare LGSW  TOPIC: MH EBP Group: Mindfulness  [facility] 55+ Program  TRACK: A1    NUMBER OF PARTICIPANTS: 7    Summary of Group / Topics Discussed:  Mindfulness: What is Mindfulness: Patients received an overview on what mindfulness is and how mindfulness can benefit general health, mental health symptoms, and stressors. The history of mindfulness, its application to mental health therapies, and key concepts were also discussed. Patients discussed current awareness, knowledge, and practice of mindfulness skills. Patients also discussed barriers to mindfulness practice.     Patient Session Goals / Objectives:    Demonstrated understanding of key concepts and application to daily life    Identified when/how to use mindfulness     Resolved barriers to practice    Identified plan to use mindfulness in daily life                                      Service Modality:  Video Visit     Telemedicine Visit: The patient's condition can be safely assessed and treated via synchronous audio and visual telemedicine encounter.      Reason for Telemedicine Visit: Services only offered telehealth    Originating Site (Patient Location): Patient's home    Distant Site (Provider Location): Provider Remote Setting- Home Office    Consent:  The patient/guardian has verbally consented to: the potential risks and benefits of telemedicine (video visit) versus in person care; bill my insurance or make self-payment for services provided; and responsibility for payment of non-covered services.     Patient would like the video invitation sent by:  My Chart    Mode of Communication:  Video Conference via Medical Zoom    As the provider I attest to compliance with applicable laws and regulations related to telemedicine.                                Patient Participation / Response:  Moderately participated, sharing some personal reflections / insights and adequately adequately received / provided feedback with other participants.    Demonstrated understanding of topics discussed through group discussion and participation and Practiced skills in session    Treatment Plan:  Patient has a current master individualized treatment plan.  See Epic treatment plan for more information.    Luh Hare LGSW

## 2023-03-31 ENCOUNTER — HOSPITAL ENCOUNTER (OUTPATIENT)
Dept: BEHAVIORAL HEALTH | Facility: CLINIC | Age: 68
Discharge: HOME | End: 2023-03-31
Attending: PSYCHIATRY & NEUROLOGY
Payer: MEDICARE

## 2023-03-31 PROCEDURE — 90853 GROUP PSYCHOTHERAPY: CPT | Mod: GT

## 2023-03-31 PROCEDURE — 90853 GROUP PSYCHOTHERAPY: CPT | Mod: GT | Performed by: SOCIAL WORKER

## 2023-03-31 NOTE — GROUP NOTE
Psychotherapy Group Note    PATIENT'S NAME: Randi Cleary  MRN:   [id number]  :   1955  ACCT. NUMBER: [id number]   DATE OF SERVICE: 3/31/23  START TIME: 11:00 AM  END TIME: 11:50 AM  FACILITATOR: Luh Hare LGSW  TOPIC: MH EBP Group: Specialty Awareness  [facility] 55+ Program  TRACK: A1    NUMBER OF PARTICIPANTS: 6    Summary of Group / Topics Discussed:  Specialty Topics: Forgiveness: Patients were provided with an overview of the topic of forgiveness including how to better understand the nature of forgiveness of others and oneself. Exploring the phases of forgiveness and how one works them was covered. Patients were able to explore how practicing forgiveness may positively impact their functioning.     Patient Session Goals / Objectives:    Discussed definitions of forgiveness and self-forgiveness    Explored the phases and process of forgiveness    Discussed benefits of forgiveness to their relationships with themselves and others, connecting the concept to mindfulness and acceptance    Assisted patients in recognizing when practicing forgiveness may be helpful                                      Service Modality:  Video Visit     Telemedicine Visit: The patient's condition can be safely assessed and treated via synchronous audio and visual telemedicine encounter.      Reason for Telemedicine Visit: Services only offered telehealth    Originating Site (Patient Location): Patient's home    Distant Site (Provider Location): Provider Remote Setting- Home Office    Consent:  The patient/guardian has verbally consented to: the potential risks and benefits of telemedicine (video visit) versus in person care; bill my insurance or make self-payment for services provided; and responsibility for payment of non-covered services.     Patient would like the video invitation sent by:  My Chart    Mode of Communication:  Video Conference via Medical Zoom    As the provider I attest to compliance with  applicable laws and regulations related to telemedicine.                              Patient Participation / Response:  Fully participated with the group by sharing personal reflections / insights and openly received / provided feedback with other participants.    Demonstrated understanding of topics discussed through group discussion and participation, Identified / Expressed readiness to act on skill suggestions discussed in topic, Verbalized understanding of ways to proactively manage illness and Identified plan to address barriers to practicing skills discussed in topic    Treatment Plan:  Patient has a current master individualized treatment plan.  See Epic treatment plan for more information.    Luh Hare LGSW

## 2023-03-31 NOTE — GROUP NOTE
Psychoeducation Group Note    PATIENT'S NAME: Randi Cleary  MRN:   [id number]  :   1955  ACCT. NUMBER: [id number]  DATE OF SERVICE: 3/31/23  START TIME: 10:00 AM  END TIME: 10:50 AM  FACILITATOR: Brittani Molina LICSW  TOPIC: MH Wellness Group: Health Maintenance  Service Modality:  Video Visit     Telemedicine Visit: The patient's condition can be safely assessed and treated via synchronous audio and visual telemedicine encounter.       Reason for Telemedicine Visit: Services only offered telehealth and due to COVID-19.     Originating Site (Patient Location): Patient's home     Distant Site (Provider Location): Provider Remote Setting- Home Office     Consent:  The patient/guardian has verbally consented to: the potential risks and benefits of telemedicine (video visit) versus in person care; bill my insurance or make self-payment for services provided; and responsibility for payment of non-covered services.      Patient would like the video invitation sent by:  My Chart     Mode of Communication:  Video Conference via Medical Zoom     As the provider I attest to compliance with applicable laws and regulations related to telemedicine.     [facility] 55+ Program  TRACK: A1    NUMBER OF PARTICIPANTS: 6    Summary of Group / Topics Discussed:  Health Maintenance: Weekend planning: Patients were given time to complete a weekend plan of what they will do to promote wellness and sobriety over the weekend when they do not have the structure of group. Patients were encouraged to review progress on their treatment goals and were challenged to identify ways to work toward meeting them. Patients identified and discussed foreseeable barriers to success over the weekend and then developed a plan to overcome them. Patients reviewed their distress coping skills and social support network and discussed this with the group.       Patient Session Goals / Objectives:    ?    Identified activities to engage  in that promote balance in wellness  ?    Distinguished possible barriers to success over the weekend and created a plan to overcome them  ?    Listed distress coping skills and identified social support network to utilize if in crisis during the weekend        Patient Participation / Response:  Fully participated with the group by sharing personal reflections / insights and openly received / provided feedback with other participants.    Demonstrated understanding of topics discussed through group discussion and participation and Verbalized understanding of health maintenance topic    Treatment Plan:  Patient has a current master individualized treatment plan.  See Epic treatment plan for more information.    Brittani Molina, LICSW
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
## 2023-04-03 ENCOUNTER — TELEPHONE (OUTPATIENT)
Dept: MAMMOGRAPHY | Facility: CLINIC | Age: 68
End: 2023-04-03

## 2023-04-03 NOTE — TELEPHONE ENCOUNTER
Left a message for Winnie regarding scheduling her 6 month follow up contrast mammogram.  Awaiting return phone call.  Call back number left was [phone].

## 2023-04-04 ENCOUNTER — TELEPHONE (OUTPATIENT)
Dept: PALLIATIVE MEDICINE | Facility: OTHER | Age: 68
End: 2023-04-04

## 2023-04-04 DIAGNOSIS — G95.20 CORD COMPRESSION (H): ICD-10-CM

## 2023-04-04 RX ORDER — HYDROCODONE BITARTRATE AND ACETAMINOPHEN 5; 325 MG/1; MG/1
1 TABLET ORAL EVERY 6 HOURS PRN
Qty: 112 TABLET | Refills: 0 | Status: SHIPPED | OUTPATIENT
Start: 2023-04-04 | End: 2023-04-11

## 2023-04-04 NOTE — TELEPHONE ENCOUNTER
Pending Prescriptions:                       Disp   Refills    HYDROcodone-acetaminophen (NORCO) 5-325 M*112 ta*0            Sig: Take 1 tablet by mouth every 6 hours as needed           for breakthrough pain or moderate to severe pain           May fill/start 4/4/23    [place]

## 2023-04-04 NOTE — TELEPHONE ENCOUNTER
[facility] Call Center    Phone Message    May a detailed message be left on voicemail: yes     Reason for Call: Medication Refill Request    Has the patient contacted the pharmacy for the refill? Yes   Name of medication being requested: HYDROcodone-acetaminophen (NORCO) 5-325 MG tablet  Provider who prescribed the medication: Dusty Dubois MD  Pharmacy: [facility][id number] [place] [address] [address]  Date medication is needed: 3 days         Action Taken: Message routed to:  Other: [facility]    Travel Screening: Not Applicable

## 2023-04-04 NOTE — TELEPHONE ENCOUNTER
Received call from patient requesting refill(s) of HYDROcodone-acetaminophen (NORCO) 5-325 MG tablet     Last dispensed from pharmacy on 02/27/23    Patient's last office/virtual visit by prescribing provider on 01/18/23  Next office/virtual appointment scheduled for 04/11/23    Last urine drug screen date 11/16/22  Current opioid agreement on file (completed within the last year) Yes Date of opioid agreement: 11/16/22    E-prescribe to     [facility][id number] - [place], [place] - [address]  [address] [place] [id number]  Phone: [phone] Fax: [phone]    Will route to nursing Center for review and preparation of prescription(s).       Nevin Gunderson MA  [facility] [place]

## 2023-04-05 ENCOUNTER — HOSPITAL ENCOUNTER (OUTPATIENT)
Dept: BEHAVIORAL HEALTH | Facility: CLINIC | Age: 68
Discharge: HOME | End: 2023-04-05
Attending: PSYCHIATRY & NEUROLOGY
Payer: MEDICARE

## 2023-04-05 DIAGNOSIS — M47.12 CERVICAL SPONDYLOSIS WITH MYELOPATHY: ICD-10-CM

## 2023-04-05 DIAGNOSIS — G95.20 CORD COMPRESSION (H): ICD-10-CM

## 2023-04-05 DIAGNOSIS — M54.12 CERVICAL RADICULOPATHY: ICD-10-CM

## 2023-04-05 PROCEDURE — 90853 GROUP PSYCHOTHERAPY: CPT | Mod: PO

## 2023-04-05 PROCEDURE — 90853 GROUP PSYCHOTHERAPY: CPT | Mod: GT | Performed by: SOCIAL WORKER

## 2023-04-05 RX ORDER — METHOCARBAMOL 500 MG/1
500 TABLET, FILM COATED ORAL 3 TIMES DAILY
Qty: 90 TABLET | Refills: 3 | Status: SHIPPED | OUTPATIENT
Start: 2023-04-05 | End: 2023-07-05

## 2023-04-05 NOTE — GROUP NOTE
Psychotherapy Group Note    PATIENT'S NAME: Randi Cleary  MRN:   [id number]  :   1955  ACCT. NUMBER: [id number]  DATE OF SERVICE: 23  START TIME: 11:00 AM  END TIME: 11:50 AM  FACILITATOR: Luh Hare LGSW  TOPIC: MH EBP Group: Relationship Skills  [facility] 55+ Program  TRACK: A1    NUMBER OF PARTICIPANTS: 7    Summary of Group / Topics Discussed:  Relationship Skills: Boundaries: Patients were provided with a general overview of interpersonal boundaries and how lack of boundaries relates to symptoms and functioning. The purpose is to help patients identify boundary issues and gain awareness and skills to work towards healthier interpersonal boundaries. Current awareness of healthy boundary characteristics and barriers to establishing healthy boundaries were discussed.    Patient Session Goals / Objectives:    Familiarized patients with the concept of interpersonal boundaries and their characteristics    Discussed and practiced strategies to promote healthier interpersonal boundaries    Identified boundary issues and identified plan to improve boundaries                                      Service Modality:  Video Visit     Telemedicine Visit: The patient's condition can be safely assessed and treated via synchronous audio and visual telemedicine encounter.      Reason for Telemedicine Visit: Services only offered telehealth    Originating Site (Patient Location): Patient's home    Distant Site (Provider Location): Provider Remote Setting- Home Office    Consent:  The patient/guardian has verbally consented to: the potential risks and benefits of telemedicine (video visit) versus in person care; bill my insurance or make self-payment for services provided; and responsibility for payment of non-covered services.     Patient would like the video invitation sent by:  My Chart    Mode of Communication:  Video Conference via Medical Zoom    As the provider I attest to compliance with  applicable laws and regulations related to telemedicine.                               Patient Participation / Response:  Fully participated with the group by sharing personal reflections / insights and openly received / provided feedback with other participants.    Demonstrated understanding of topics discussed through group discussion and participation, Demonstrated understanding of relationship skills and communication skills, Identified / Expressed personal readiness to incorporate effective communication skills, Verbalized understanding of communication skills, communication challenges, and communication strengths and Identified plan to address barriers to practicing relationship skills     Treatment Plan:  Patient has a current master individualized treatment plan.  See Epic treatment plan for more information.    Luh Hare LGSW

## 2023-04-05 NOTE — TELEPHONE ENCOUNTER
Received fax from pharmacy requesting refill(s) for        methocarbamol (ROBAXIN) 500 MG tablet        Date last filled 11/28/22    Last Appt Date:none    Next Appt scheduled: 4/11/23    Pharmacy:[facility] #[id number] [place], [place] - [address] route for processing    Maryan Temple MA  [facility] [place]
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
## 2023-04-05 NOTE — GROUP NOTE
Psychoeducation Group Note    PATIENT'S NAME: Randi Cleary  MRN:   [id number]  :   1955  ACCT. NUMBER: [id number]  DATE OF SERVICE: 23  START TIME: 10:00 AM  END TIME: 10:50 AM  FACILITATOR: Brittani Molina LICSW  TOPIC: MH Wellness Group: Mental Health Maintenance  Service Modality:  Video Visit     Telemedicine Visit: The patient's condition can be safely assessed and treated via synchronous audio and visual telemedicine encounter.       Reason for Telemedicine Visit: Services only offered telehealth and due to COVID-19.     Originating Site (Patient Location): Patient's home     Distant Site (Provider Location): Provider Remote Setting- Home Office     Consent:  The patient/guardian has verbally consented to: the potential risks and benefits of telemedicine (video visit) versus in person care; bill my insurance or make self-payment for services provided; and responsibility for payment of non-covered services.      Patient would like the video invitation sent by:  My Chart     Mode of Communication:  Video Conference via Medical Zoom     As the provider I attest to compliance with applicable laws and regulations related to telemedicine.     [facility] 55+ Program  TRACK: A1    NUMBER OF PARTICIPANTS: 7    Summary of Group / Topics Discussed:  Mental Health Maintenance:  Vulnerability: In this group, the concept of vulnerability was explored through the viewing, discussion, and self-reflection of the Alexa Estrada Talk Titled,  The Power of Vulnerability.      Patient Session Goals / Objectives:  ? Defined and described definition of vulnerability   ? Identified 2 or more ways of practicing authenticity         Patient Participation / Response:  Fully participated with the group by sharing personal reflections / insights and openly received / provided feedback with other participants.    Demonstrated understanding of topics discussed through group discussion and participation and  Verbalized understanding of mental health maintenance topic    Treatment Plan:  Patient has a current master individualized treatment plan.  See Epic treatment plan for more information.    Brittani Molina, SUSANSW

## 2023-04-06 ENCOUNTER — TELEPHONE (OUTPATIENT)
Dept: BEHAVIORAL HEALTH | Facility: CLINIC | Age: 68
End: 2023-04-06
Payer: MEDICARE

## 2023-04-06 NOTE — PROGRESS NOTES
Acknowledgement of Current Treatment Plan       I have reviewed my treatment plan with my therapist, LIZBETH Prasad LICSW.   I agree with the plan as it is written in the electronic health record. (A-1 Track)      Name:      Signature:  Randi Cleary         Unable to sign due to virtual visit. SP   Roland Das MD  Psychiatrist/Medical Director   Roland Das MD on 4/6/2023 at 1:04 PM   LIZBETH Prasad LICSW  Psychotherapist   Signed electronically: LIZBETH Prasad LICSW (SP)  4/6/2023 11:25 am

## 2023-04-06 NOTE — ADDENDUM NOTE
Encounter addended [facility]: [facility] on: 4/6/2023 11:28 AM   Actions taken: Clinical Note Signed

## 2023-04-07 ENCOUNTER — VIRTUAL VISIT (OUTPATIENT)
Dept: PSYCHOLOGY | Facility: CLINIC | Age: 68
End: 2023-04-07
Payer: MEDICARE

## 2023-04-07 ENCOUNTER — HOSPITAL ENCOUNTER (OUTPATIENT)
Dept: BEHAVIORAL HEALTH | Facility: CLINIC | Age: 68
Discharge: HOME | End: 2023-04-07
Attending: PSYCHIATRY & NEUROLOGY
Payer: MEDICARE

## 2023-04-07 DIAGNOSIS — F31.81 BIPOLAR 2 DISORDER (H): Primary | ICD-10-CM

## 2023-04-07 DIAGNOSIS — F41.1 GENERALIZED ANXIETY DISORDER: ICD-10-CM

## 2023-04-07 DIAGNOSIS — F43.9 TRAUMA AND STRESSOR-RELATED DISORDER: ICD-10-CM

## 2023-04-07 PROCEDURE — 90837 PSYTX W PT 60 MINUTES: CPT | Mod: VID | Performed by: SOCIAL WORKER

## 2023-04-07 PROCEDURE — 90853 GROUP PSYCHOTHERAPY: CPT | Mod: GT

## 2023-04-07 ASSESSMENT — ANXIETY QUESTIONNAIRES
3. WORRYING TOO MUCH ABOUT DIFFERENT THINGS: SEVERAL DAYS
5. BEING SO RESTLESS THAT IT IS HARD TO SIT STILL: SEVERAL DAYS
2. NOT BEING ABLE TO STOP OR CONTROL WORRYING: MORE THAN HALF THE DAYS
GAD7 TOTAL SCORE: 10
GAD7 TOTAL SCORE: 10
7. FEELING AFRAID AS IF SOMETHING AWFUL MIGHT HAPPEN: SEVERAL DAYS
1. FEELING NERVOUS, ANXIOUS, OR ON EDGE: MORE THAN HALF THE DAYS
7. FEELING AFRAID AS IF SOMETHING AWFUL MIGHT HAPPEN: SEVERAL DAYS
GAD7 TOTAL SCORE: 10
4. TROUBLE RELAXING: MORE THAN HALF THE DAYS
6. BECOMING EASILY ANNOYED OR IRRITABLE: SEVERAL DAYS

## 2023-04-07 NOTE — GROUP NOTE
Psychotherapy Group Note    PATIENT'S NAME: Randi Cleary  MRN:   [id number]  :   1955  ACCT. NUMBER: [id number]  DATE OF SERVICE: 23  START TIME: 10:00 AM  END TIME: 10:50 AM  FACILITATOR: Sheeba Velez LMFT  TOPIC: MH EBP Group: [facility] Adult Mental Health Day Treatment  TRACK: A1    NUMBER OF PARTICIPANTS: 8                                      Service Modality:  Video Visit     Telemedicine Visit: The patient's condition can be safely assessed and treated via synchronous audio and visual telemedicine encounter.      Reason for Telemedicine Visit: Patient has requested telehealth visit    Originating Site (Patient Location): Patient's home    Distant Site (Provider Location): Provider Remote Setting- Home Office    Consent:  The patient/guardian has verbally consented to: the potential risks and benefits of telemedicine (video visit) versus in person care; bill my insurance or make self-payment for services provided; and responsibility for payment of non-covered services.     Patient would like the video invitation sent by:  My Chart    Mode of Communication:  Video Conference via Medical Zoom    As the provider I attest to compliance with applicable laws and regulations related to telemedicine.            Summary of Group / Topics Discussed:  Mindfulness: Mindfulness Experiential: Patients received an overview on what mindfulness is and how mindfulness can benefit general health, mental health symptoms, and stressors. The history of mindfulness, its application to mental health therapies, and key concepts were also discussed. Patients discussed current awareness, knowledge, and practice of mindfulness skills. Patients also discussed barriers to mindfulness practice.    Patient Session Goals / Objectives:    Demonstrated and verbalized understanding of key mindfulness concepts    Identified when/how to use mindfulness skills    Resolved barriers to practicing  mindfulness skills    Identified plan to use mindfulness skills in daily life     Discovered how music can be used to change and uplift mood      Patient Participation / Response:  Fully participated with the group by sharing personal reflections / insights and openly received / provided feedback with other participants.    Demonstrated understanding of topics discussed through group discussion and participation and Practiced skills in session    Treatment Plan:  Patient has a current master individualized treatment plan.  See Epic treatment plan for more information.    Sheeba Velez LMFT

## 2023-04-07 NOTE — GROUP NOTE
Process Group Note    PATIENT'S NAME: Randi Cleary  MRN:   [id number]  :   1955  ACCT. NUMBER: [id number]  DATE OF SERVICE: 23  START TIME:  9:00 AM  END TIME:  9:50 AM  FACILITATOR: Sheeba Velez LMFT  TOPIC:  Process Group    Diagnoses:  296.33 (F33.2) Major Depressive Disorder, Recurrent Episode, Severe With anxious distress  4. Other Diagnoses that is relevant to services:   300.02 (F41.1) Generalized Anxiety Disorder    5. Provisional Diagnosis:  296.89 Bipolar II Disorder vs. 314.01 (F90.2) Attention-Deficit/Hyperactivity Disorder   Combined presentation       [facility] Day Treatment  TRACK: A1    NUMBER OF PARTICIPANTS: 8                                      Service Modality:  Video Visit     Telemedicine Visit: The patient's condition can be safely assessed and treated via synchronous audio and visual telemedicine encounter.      Reason for Telemedicine Visit: Patient has requested telehealth visit    Originating Site (Patient Location): Patient's home    Distant Site (Provider Location): Provider Remote Setting- Home Office    Consent:  The patient/guardian has verbally consented to: the potential risks and benefits of telemedicine (video visit) versus in person care; bill my insurance or make self-payment for services provided; and responsibility for payment of non-covered services.     Patient would like the video invitation sent by:  My Chart    Mode of Communication:  Video Conference via Medical Zoom    As the provider I attest to compliance with applicable laws and regulations related to telemedicine.                                  Data:    Session content: At the start of this group, patients were invited to check in by identifying themselves, describing their current emotional status, and identifying issues to address in this group.   Area(s) of treatment focus addressed in this session included Symptom Management, Personal Safety, Develop /  Improve Independent Living Skills and Develop Socialization / Interpersonal Relationship Skills.    Patient reported having a slow start this morning. Patient stated she believes her nerve block is fading and is having eye aches. Patient stated she struggles on holiday weekends and is trying not to fixate on it. Patient stated she also needs to be cognizant and try and do something special. Barriers are not having family around. Patient stated she had a weekly session with her therapist who brought up support and friends. Patient stated she has three good friends but does not see them often. Patient stated one she has been friends with since grade school. Patient reported feeling proud of her friend retiring early and starting investing and volunteering for an animal rescue group. Patient stated this has her thinking she would like to get a job or volunteer somewhere. Patient reported feeling proud of having a friend she can talk with that hold opposite views of her own. Patient also stated needing to get her taxes done this week.     Therapeutic Interventions/Treatment Strategies:  Psychotherapist offered support, feedback and validation and reinforced use of skills. Treatment modalities used include Motivational Interviewing, Cognitive Behavioral Therapy and Dialectical Behavioral Therapy. Interventions include Behavioral Activation: Explored how behaviors effect mood and interact with thoughts and feelings and Emotions Management:  Reinforced the purpose and biological basis of emotions, Discussed barriers to emotional regulation, Reviewed opposite action skill and Increased awareness of daily mood patterns/changes.    Assessment:    Patient response:   Patient responded to session by accepting feedback, listening, focusing on goals, being attentive and accepting support    Possible barriers to participation / learning include: and no barriers identified    Health Issues:   Yes: Pain, Associated Psychological  Distress       Substance Use Review:   Substance Use: No active concerns identified.    Mental Status/Behavioral Observations  Appearance:   Appropriate   Eye Contact:   Good   Psychomotor Behavior: Normal   Attitude:   Cooperative   Orientation:   All  Speech   Rate / Production: Normal    Volume:  Normal   Mood:    Anxious  Depressed   Affect:    Appropriate   Thought Content:   Clear  Thought Form:  Coherent  Logical     Insight:    Good     Plan:     Safety Plan: No current safety concerns identified.  Recommended that patient call 911 or go to the local ED should there be a change in any of these risk factors.     Barriers to treatment: None identified    Patient Contracts (see media tab):  None    Substance Use: Not addressed in session     Continue or Discharge: Patient will continue in 55+ Program (55+) as planned. Patient is likely to benefit from learning and using skills as they work toward the goals identified in their treatment plan.      EWELINA Marino  April 7, 2023

## 2023-04-07 NOTE — PROGRESS NOTES
[facility]                                     Progress Note    Patient Name: Randi Cleary  Date: 4/7/23         Service Type: Individual     Session Start Time: 12:32 PM  Session End Time: 1:25 PM     Session Length: 53 minutes    Session #: 170    Attendees: Client attended alone    Service Modality:  Video Visit:      Provider verified identity through the following two step process.  Patient provided:  Patient is known previously to provider    Telemedicine Visit: The patient's condition can be safely assessed and treated via synchronous audio and visual telemedicine encounter.      Reason for Telemedicine Visit: Patient convenience (e.g. access to timely appointments / distance to available provider)    Originating Site (Patient Location): Patient's home    Distant Site (Provider Location): [facility]    Consent:  The patient/guardian has verbally consented to: the potential risks and benefits of telemedicine (video visit) versus in person care; bill my insurance or make self-payment for services provided; and responsibility for payment of non-covered services.     Patient would like the video invitation sent by:  My Chart    Mode of Communication:  Video Conference via Amwell    Distant Location (Provider):  On-site    As the provider I attest to compliance with applicable laws and regulations related to telemedicine.    DATA  Extended Session (53+ minutes): PROLONGED SERVICE IN THE OUTPATIENT SETTING REQUIRING DIRECT (FACE-TO-FACE) PATIENT CONTACT BEYOND THE USUAL SERVICE:    - High distress and under complex circumstances.  See Data section for details  Interactive Complexity: No  Crisis: No        Progress Since Last Session (Related to Symptoms / Goals / Homework):   Symptoms: Continuing to have high anxiety and difficulties managing stress    Homework: Progress made  Decide next plan of action for your health  Keep getting out in the  community    In the future:  Look at a budget  Get back to the pool  Talk to your primary care doctor about what's on your list (mood, neck, arthritis)           Episode of Care Goals: Satisfactory progress - ACTION (Actively working towards change); Intervened by reinforcing change plan / affirming steps taken     Current / Ongoing Stressors and Concerns:   Patient is currently socially isolated. She has a conflictual relationship with her .  She is getting minimal physical activity.  She has had several surgeries.      Treatment Objective(s) Addressed in This Session:   Patient will increase frequency of engaging her in ADLs.  Patient will track and record at least 5 pleasant exchanges with . Patient will be able to identify at least 5 positive traits about her .  Patient will reduce level of depressive and anxious features as evidenced by reduction in score on her CHAVO-7 and PHQ-9 (scores of 15 and 16 at first measurment, respectively).     Intervention:   Supportive Therapy:  Talked through her medical concerns and disappointment and frustration that she still does not have any answers. Talked about where to focus, including looking at her mental health concerns. Looked at ways she's trying to find stabilization.     The following assessments were completed by patient for this visit:  PHQ9:       1/30/2023     1:48 PM 2/5/2023     9:43 PM 2/14/2023     2:22 PM 2/27/2023    10:44 AM 3/13/2023     8:02 AM 3/20/2023    10:35 AM 4/7/2023    12:25 PM   PHQ-9 SCORE   PHQ-9 Total Score MyChart 13 (Moderate depression) 18 (Moderately severe depression) 18 (Moderately severe depression) 20 (Severe depression) 16 (Moderately severe depression) 15 (Moderately severe depression) 14 (Moderate depression)   PHQ-9 Total Score 13 18 18 20 16 15 14     GAD7:       1/9/2023    12:30 PM 1/30/2023     1:49 PM 2/5/2023     9:49 PM 2/20/2023    10:48 AM 3/9/2023     9:27 AM 3/24/2023     9:06 AM 4/7/2023    12:26  PM   CHAVO-7 SCORE   Total Score  17 (severe anxiety) 16 (severe anxiety) 17 (severe anxiety) 15 (severe anxiety) 15 (severe anxiety) 10 (moderate anxiety)   Total Score  17 16 17    17    17    17    17 15    15    15 15    15    15    15 10       Information is confidential and restricted. Go to Review Flowsheets to unlock data.    Multiple values from one day are sorted in reverse-chronological order     PROMIS 10-Global Health (only subscores and total score):       9/6/2022    10:29 AM 12/15/2022    12:10 PM 12/27/2022     1:17 AM 1/5/2023    11:28 AM 2/5/2023     9:48 PM 3/9/2023     9:30 AM 4/7/2023    12:28 PM   PROMIS-10 Scores Only   Global Mental Health Score 7    7    7 6    6    6 6    6    6 5    5        5 5    5    5    5    5 6    6    6 7    7    7    7   Global Physical Health Score 9    9    9 7    7    7 8    8    8 8    8        8 8    8    8    8    8 6    6    6 8    8    8    8   PROMIS TOTAL - SUBSCORES 16    16    16 13    13    13 14    14    14 13    13        13 13    13    13    13    13 12    12    12 15    15    15    15       Information is confidential and restricted. Go to Review Flowsheets to unlock data.    Multiple values from one day are sorted in reverse-chronological order        ASSESSMENT: Current Emotional / Mental Status (status of significant symptoms):   Risk status (Self / Other harm or suicidal ideation)   Patient denies current fears or concerns for personal safety.   Patient denies current or recent suicidal ideation or behaviors.   Patient denies current or recent homicidal ideation or behaviors.   Patient denies current or recent self injurious behavior or ideation.   Patient denies other safety concerns.   Patient reports there has been no change in risk factors since their last session.     Patient reports there has been no change in protective factors since their last session.     A safety and risk management plan has been developed including: Patient consented  to co-developed safety plan on 11/24/2020, updated 2/20/23.  Safety and risk management plan was reviewed.   Patient agreed to use safety plan should any safety concerns arise.  A copy was made available to the patient.     Appearance:   Appropriate    Eye Contact:   Good    Psychomotor Behavior: Normal    Attitude:   Cooperative    Orientation:   All   Speech    Rate / Production: Normal/ Responsive    Volume:  Normal    Mood:    Normal   Affect:    Appropriate    Thought Content:  Clear    Thought Form:  Coherent    Insight:    Good      Medication Review:   No changes to current psychiatric medication(s)     Medication Compliance:   Yes     Changes in Health Issues:   None reported     Chemical Use Review:   Substance Use: Chemical use reviewed, no active concerns identified Nothing used since August 2021.     Tobacco Use: No current tobacco use.      Diagnosis:  1. Bipolar 2 disorder (H)    2. Generalized anxiety disorder    3. Trauma and stressor-related disorder      Collateral Reports Completed:   Not Applicable    PLAN: (Patient Tasks / Therapist Tasks / Other)  Decide next plan of action for your health  Keep getting out in the community    In the future:  Look at a budget  Get back to the pool  Talk to your primary care doctor about what's on your list (mood, neck, arthritis)        There has been demonstrated improvement in functioning while patient has been engaged in psychotherapy/psychological service- if withdrawn the patient would deteriorate and/or relapse.     MICAELA SLADE   April 7, 2023                                                        ______________________________________________________________________    Individual Treatment Plan    Patient's Name: Randi Cleary  YOB: 1955    Date of Creation: 6/30/20  Date Treatment Plan Last Reviewed/Revised: 3/2/23    DSM5 Diagnoses: 296.89 Bipolar II Disorder Depressed, 300.02 (F41.1) Generalized Anxiety Disorder or  "Adjustment Disorders  309.89 (F43.8) Other Specified Trauma and Stressor Related Disorder  Psychosocial / Contextual Factors: Patient's entire family of origin has , she now has a sister-in-law and  as support.  Relationship with  is conflictual. She is recovering from surgeries  PROMIS (reviewed every 90 days): PROMIS-10 Scores  PROMIS 10-Global Health (only subscores and total score):   PROMIS-10 Scores Only 2021 3/15/2022 3/15/2022 3/15/2022 3/24/2022 2022 2022   Global Mental Health Score 6 6 6 6 8 12 12   Global Physical Health Score 9 9 9 9 8 11 10   PROMIS TOTAL - SUBSCORES 15 15 15 15 16 23 22       Referral / Collaboration:  Referral to another professional/service is not indicated at this time..    Anticipated number of session for this episode of care: 50  Anticipation frequency of session: Biweekly  Anticipated Duration of each session: 53 or more minutes due to intensity of trauma symptoms  Treatment plan will be reviewed in 90 days or when goals have been changed.   There has been demonstrated improvement in functioning while patient has been engaged in psychotherapy/psychological service- if withdrawn the patient would deteriorate and/or relapse.       MeasurableTreatment Goal(s) related to diagnosis / functional impairment(s)  Goal 1: Patient will \"jumpstart, getting going with the things I need to be doing around the house as far as picking up, doing things, trying to do something every day.  Also to lessen the animosity between me and my .\"    I will know I've met my goal when my shoulders are fixed and I can see.      Objective #A (Patient Action)    Patient will increase frequency of engaging her in ADLs.  Status: Continued - Date(s): 12/10/21, 3/9/22, 22, 22, 22, 3/2/23    Intervention(s)  Therapist will engage patient in CBT, specifically behavioral activation.    Objective #B  Patient will  track and record at least 5 pleasant exchanges with " ". Patient will be able to identify at least 5 positive traits about her  and how he relates to her.  Status: Continued - Date(s): 12/10/21, 3/9/22, 6/9/22, 9/2/22, 12/1/22, 3/2/23    Intervention(s)  Therapist will teach assertiveness skills and assign homework related to relationship interactions.    Objective #C  Patient will reduce level of depressive and anxious features as evidenced by reduction in score on her CHAVO-7 and PHQ-9 (scores of 15 and 16 at first measurment, respectively).  Status: Continued - Date(s): 12/10/21, 3/9/22, 6/9/22, 9/2/22, 12/1/22, 3/2/23    Intervention(s)  Therapist will engage patient in person-centered therapy and CBT.    Patient has reviewed and agreed to the above plan.      MICAELA SLADE  March 2, 2023                                                   Randi Cleary          SAFETY PLAN:  Step 1: Warning signs / cues (Thoughts, images, mood, situation, behavior) that a crisis may be developing:  ? Thoughts: \"I don't want to continue\" \"I am unwanted\"  ? Images: none  ? Thinking Processes: ruminating  ? Mood: anger  ? Behaviors: isolating/withdrawing , can't stop crying, not taking care of myself and not taking care of my responsibilities  ? Situations: small triggers, such as not being able to find something, or dropping something   Step 2: Coping strategies - Things I can do to take my mind off of my problems without contacting another person (relaxation technique, physical activity):  ? Distress Tolerance Strategies:  arts and crafts: drawing, play with my pet , listen to positive and upbeat music: any, change body temperature (ice pack/cold water)  and paced breathing/progressive muscle relaxation  ? Physical Activities: go for a walk, deep breathing and stretching   ? Focus on helpful thoughts:  \"You've been through this before, you can get through it again.\"  Step 3: People and social settings that provide " distraction:                 Name: Carmen                            Name: Darien                           Name: Aleida       ? pool, shopping, Carmen's house, [employer]       Step 4: Remind myself of people and things that are important to me and worth living for:  Clifford Little Donna, post-COVID world, options of what could be in your future        Step 5: When I am in crisis, I can ask these people to help me use my safety plan:                 Name: Sidney  Step 6: Making the environment safe:   ? go to sleep/daydream  Step 7: Professionals or agencies I can contact during a crisis:  ? [facility] Daytime Number: [phone]  ? Suicide Prevention Lifeline: 4-511-742-SHGV (2877)  ? Crisis Text Line Service (available 24 hours a day, 7 days a week): Text [place] to 747977    Local Crisis Services: [facility] Crisis: [phone]  Adults can always access to the emPATH unit at [facility] (no phone number, utilize it like an urgent care or ER where you just show up)     Call 911 or go to my nearest emergency department.       I helped develop this safety plan and agree to use it when needed.  I have been given a copy of this plan.       Client signature _________________________________________________________________  Today s date:  11/24/2020  Adapted from Safety Plan Template 2008 Randi Poole and Robby Barba is reprinted with the express permission of the authors.  No portion of the Safety Plan Template may be reproduced without the express, written permission.  You can contact the authors at [email or web address].[email or web address] or [email or web address].

## 2023-04-07 NOTE — GROUP NOTE
Psychotherapy Group Note    PATIENT'S NAME: Randi Cleary  MRN:   [id number]  :   1955  ACCT. NUMBER: [id number]  DATE OF SERVICE: 23  START TIME: 11:00 AM  END TIME: 11:50 AM  FACILITATOR: Luh Hare LGSW  TOPIC: MH EBP Group: Relationship Skills  [facility] 55+ Program  TRACK: A1    NUMBER OF PARTICIPANTS: 8    Summary of Group / Topics Discussed:  Relationship Skills: Assertive Communication: Patients were provided with a general overview of assertive communication skills and how practicing assertive communication skills will assist patients in developing healthier and more effective relationships. Patients reviewed their current awareness on ability to practice assertive communication, ways to increase assertive communication, and identified/problem solved barriers to assertive communication.     Patient Session Goals / Objectives:    Identified and discussed patient individual challenges with communication    Presented and practiced effective communication skills in session    Assisted patients in implementing more effective communication skills in their relationships    Discussed and applied DBT's MILES                                    Service Modality:  Video Visit     Telemedicine Visit: The patient's condition can be safely assessed and treated via synchronous audio and visual telemedicine encounter.      Reason for Telemedicine Visit: Services only offered telehealth    Originating Site (Patient Location): Patient's home    Distant Site (Provider Location): Provider Remote Setting- Home Office    Consent:  The patient/guardian has verbally consented to: the potential risks and benefits of telemedicine (video visit) versus in person care; bill my insurance or make self-payment for services provided; and responsibility for payment of non-covered services.     Patient would like the video invitation sent by:  My Chart    Mode of Communication:  Video Conference via Medical  Zoom    As the provider I attest to compliance with applicable laws and regulations related to telemedicine.                               Patient Participation / Response:  Fully participated with the group by sharing personal reflections / insights and openly received / provided feedback with other participants.    Demonstrated understanding of topics discussed through group discussion and participation, Demonstrated understanding of relationship skills and communication skills, Identified / Expressed personal readiness to incorporate effective communication skills, Verbalized understanding of communication skills, communication challenges, and communication strengths, Identified plan to address barriers to practicing relationship skills  and Practiced skills in session    Treatment Plan:  Patient has a current master individualized treatment plan.  See Epic treatment plan for more information.    Luh Hare LGSW

## 2023-04-10 ENCOUNTER — VIRTUAL VISIT (OUTPATIENT)
Dept: PSYCHOLOGY | Facility: CLINIC | Age: 68
End: 2023-04-10
Payer: MEDICARE

## 2023-04-10 ENCOUNTER — TELEPHONE (OUTPATIENT)
Dept: PALLIATIVE MEDICINE | Facility: OTHER | Age: 68
End: 2023-04-10
Payer: MEDICARE

## 2023-04-10 DIAGNOSIS — F41.1 GENERALIZED ANXIETY DISORDER: ICD-10-CM

## 2023-04-10 DIAGNOSIS — F31.81 BIPOLAR 2 DISORDER (H): Primary | ICD-10-CM

## 2023-04-10 DIAGNOSIS — F43.9 TRAUMA AND STRESSOR-RELATED DISORDER: ICD-10-CM

## 2023-04-10 PROCEDURE — 90834 PSYTX W PT 45 MINUTES: CPT | Mod: VID | Performed by: SOCIAL WORKER

## 2023-04-10 NOTE — PROGRESS NOTES
[facility]                                     Progress Note    Patient Name: Randi Cleary  Date: 4/10/23         Service Type: Individual     Session Start Time: 11:04 AM  Session End Time: 11:50 AM     Session Length: 46 minutes    Session #: 171    Attendees: Client attended alone    Service Modality:  Video Visit:      Provider verified identity through the following two step process.  Patient provided:  Patient is known previously to provider    Telemedicine Visit: The patient's condition can be safely assessed and treated via synchronous audio and visual telemedicine encounter.      Reason for Telemedicine Visit: Patient convenience (e.g. access to timely appointments / distance to available provider)    Originating Site (Patient Location): Patient's home    Distant Site (Provider Location): Provider Remote Setting- Home Office    Consent:  The patient/guardian has verbally consented to: the potential risks and benefits of telemedicine (video visit) versus in person care; bill my insurance or make self-payment for services provided; and responsibility for payment of non-covered services.     Patient would like the video invitation sent by:  My Chart    Mode of Communication:  Video Conference via [facility]    Distant Location (Provider):  Off-site    As the provider I attest to compliance with applicable laws and regulations related to telemedicine.      DATA  Extended Session (53+ minutes): No  Interactive Complexity: No  Crisis: No        Progress Since Last Session (Related to Symptoms / Goals / Homework):   Symptoms: Still having high anxiety and distress    Homework: Progress made  Decide next plan of action for your health  Keep getting out in the community    In the future:  Look at a budget  Get back to the pool  Talk to your primary care doctor about what's on your list (mood, neck, arthritis)           Episode of Care Goals: Satisfactory progress - ACTION (Actively working  towards change); Intervened by reinforcing change plan / affirming steps taken     Current / Ongoing Stressors and Concerns:   Patient is currently socially isolated. She has a conflictual relationship with her .  She is getting minimal physical activity.  She has had several surgeries.      Treatment Objective(s) Addressed in This Session:   Patient will increase frequency of engaging her in ADLs.  Patient will track and record at least 5 pleasant exchanges with . Patient will be able to identify at least 5 positive traits about her .  Patient will reduce level of depressive and anxious features as evidenced by reduction in score on her CHAVO-7 and PHQ-9 (scores of 15 and 16 at first measurment, respectively).     Intervention:   Supportive Therapy: Processed patient's ongoing health concerns.  Talked through how to make next steps and prioritize what she needs and get them taken care of.  Identified next steps and how she will seek the support that she needs.      The following assessments were completed by patient for this visit:  PHQ9:       1/30/2023     1:48 PM 2/5/2023     9:43 PM 2/14/2023     2:22 PM 2/27/2023    10:44 AM 3/13/2023     8:02 AM 3/20/2023    10:35 AM 4/7/2023    12:25 PM   PHQ-9 SCORE   PHQ-9 Total Score MyChart 13 (Moderate depression) 18 (Moderately severe depression) 18 (Moderately severe depression) 20 (Severe depression) 16 (Moderately severe depression) 15 (Moderately severe depression) 14 (Moderate depression)   PHQ-9 Total Score 13 18 18 20 16 15 14     GAD7:       1/9/2023    12:30 PM 1/30/2023     1:49 PM 2/5/2023     9:49 PM 2/20/2023    10:48 AM 3/9/2023     9:27 AM 3/24/2023     9:06 AM 4/7/2023    12:26 PM   CHAVO-7 SCORE   Total Score  17 (severe anxiety) 16 (severe anxiety) 17 (severe anxiety) 15 (severe anxiety) 15 (severe anxiety) 10 (moderate anxiety)   Total Score  17 16 17    17    17    17    17 15    15    15 15    15    15    15 10       Information is  confidential and restricted. Go to Review Flowsheets to unlock data.    Multiple values from one day are sorted in reverse-chronological order     PROMIS 10-Global Health (only subscores and total score):       9/6/2022    10:29 AM 12/15/2022    12:10 PM 12/27/2022     1:17 AM 1/5/2023    11:28 AM 2/5/2023     9:48 PM 3/9/2023     9:30 AM 4/7/2023    12:28 PM   PROMIS-10 Scores Only   Global Mental Health Score 7    7    7 6    6    6 6    6    6 5    5        5 5    5    5    5    5 6    6    6 7    7    7    7   Global Physical Health Score 9    9    9 7    7    7 8    8    8 8    8        8 8    8    8    8    8 6    6    6 8    8    8    8   PROMIS TOTAL - SUBSCORES 16    16    16 13    13    13 14    14    14 13    13        13 13    13    13    13    13 12    12    12 15    15    15    15       Information is confidential and restricted. Go to Review Flowsheets to unlock data.    Multiple values from one day are sorted in reverse-chronological order        ASSESSMENT: Current Emotional / Mental Status (status of significant symptoms):   Risk status (Self / Other harm or suicidal ideation)   Patient denies current fears or concerns for personal safety.   Patient denies current or recent suicidal ideation or behaviors.   Patient denies current or recent homicidal ideation or behaviors.   Patient denies current or recent self injurious behavior or ideation.   Patient denies other safety concerns.   Patient reports there has been no change in risk factors since their last session.     Patient reports there has been no change in protective factors since their last session.     A safety and risk management plan has been developed including: Patient consented to co-developed safety plan on 11/24/2020, updated 2/20/23.  Safety and risk management plan was reviewed.   Patient agreed to use safety plan should any safety concerns arise.  A copy was made available to the patient.     Appearance:   Appropriate    Eye  Contact:   Good    Psychomotor Behavior: Normal    Attitude:   Cooperative    Orientation:   All   Speech    Rate / Production: Normal/ Responsive    Volume:  Normal    Mood:    Normal   Affect:    Appropriate    Thought Content:  Clear    Thought Form:  Coherent    Insight:    Good      Medication Review:   No changes to current psychiatric medication(s)     Medication Compliance:   Yes     Changes in Health Issues:   None reported     Chemical Use Review:   Substance Use: Chemical use reviewed, no active concerns identified Nothing used since 2021.     Tobacco Use: No current tobacco use.      Diagnosis:  1. Bipolar 2 disorder (H)    2. Generalized anxiety disorder    3. Trauma and stressor-related disorder      Collateral Reports Completed:   Not Applicable    PLAN: (Patient Tasks / Therapist Tasks / Other)  Decide next plan of action for your health  Keep getting out in the community    In the future:  Look at a budget  Get back to the pool  Talk to your primary care doctor about what's on your list (mood, neck, arthritis)        There has been demonstrated improvement in functioning while patient has been engaged in psychotherapy/psychological service- if withdrawn the patient would deteriorate and/or relapse.     MICAELA SLADE   April 10, 2023                                                        ______________________________________________________________________    Individual Treatment Plan    Patient's Name: Randi Cleary  YOB: 1955    Date of Creation: 20  Date Treatment Plan Last Reviewed/Revised: 3/2/23    DSM5 Diagnoses: 296.89 Bipolar II Disorder Depressed, 300.02 (F41.1) Generalized Anxiety Disorder or Adjustment Disorders  309.89 (F43.8) Other Specified Trauma and Stressor Related Disorder  Psychosocial / Contextual Factors: Patient's entire family of origin has , she now has a sister-in-law and  as support.  Relationship with  is conflictual.  "She is recovering from surgeries  PROMIS (reviewed every 90 days): PROMIS-10 Scores  PROMIS 10-Global Health (only subscores and total score):   PROMIS-10 Scores Only 12/14/2021 3/15/2022 3/15/2022 3/15/2022 3/24/2022 4/1/2022 6/9/2022   Global Mental Health Score 6 6 6 6 8 12 12   Global Physical Health Score 9 9 9 9 8 11 10   PROMIS TOTAL - SUBSCORES 15 15 15 15 16 23 22       Referral / Collaboration:  Referral to another professional/service is not indicated at this time..    Anticipated number of session for this episode of care: 50  Anticipation frequency of session: Biweekly  Anticipated Duration of each session: 53 or more minutes due to intensity of trauma symptoms  Treatment plan will be reviewed in 90 days or when goals have been changed.   There has been demonstrated improvement in functioning while patient has been engaged in psychotherapy/psychological service- if withdrawn the patient would deteriorate and/or relapse.       MeasurableTreatment Goal(s) related to diagnosis / functional impairment(s)  Goal 1: Patient will \"jumpstart, getting going with the things I need to be doing around the house as far as picking up, doing things, trying to do something every day.  Also to lessen the animosity between me and my .\"    I will know I've met my goal when my shoulders are fixed and I can see.      Objective #A (Patient Action)    Patient will increase frequency of engaging her in ADLs.  Status: Continued - Date(s): 12/10/21, 3/9/22, 6/9/22, 9/2/22, 12/1/22, 3/2/23    Intervention(s)  Therapist will engage patient in CBT, specifically behavioral activation.    Objective #B  Patient will  track and record at least 5 pleasant exchanges with . Patient will be able to identify at least 5 positive traits about her  and how he relates to her.  Status: Continued - Date(s): 12/10/21, 3/9/22, 6/9/22, 9/2/22, 12/1/22, 3/2/23    Intervention(s)  Therapist will teach assertiveness skills and " "assign homework related to relationship interactions.    Objective #C  Patient will reduce level of depressive and anxious features as evidenced by reduction in score on her CHAVO-7 and PHQ-9 (scores of 15 and 16 at first measurment, respectively).  Status: Continued - Date(s): 12/10/21, 3/9/22, 6/9/22, 9/2/22, 12/1/22, 3/2/23    Intervention(s)  Therapist will engage patient in person-centered therapy and CBT.    Patient has reviewed and agreed to the above plan.      MICAELA SLADE  March 2, 2023                                                   Randi Cleary          SAFETY PLAN:  Step 1: Warning signs / cues (Thoughts, images, mood, situation, behavior) that a crisis may be developing:  ? Thoughts: \"I don't want to continue\" \"I am unwanted\"  ? Images: none  ? Thinking Processes: ruminating  ? Mood: anger  ? Behaviors: isolating/withdrawing , can't stop crying, not taking care of myself and not taking care of my responsibilities  ? Situations: small triggers, such as not being able to find something, or dropping something   Step 2: Coping strategies - Things I can do to take my mind off of my problems without contacting another person (relaxation technique, physical activity):  ? Distress Tolerance Strategies:  arts and crafts: drawing, play with my pet , listen to positive and upbeat music: any, change body temperature (ice pack/cold water)  and paced breathing/progressive muscle relaxation  ? Physical Activities: go for a walk, deep breathing and stretching   ? Focus on helpful thoughts:  \"You've been through this before, you can get through it again.\"  Step 3: People and social settings that provide distraction:                 Name: Carmen                            Name: Darien                           Name: Aleida       ? pool, shopping, Carmen's house, [employer]       Step 4: Remind myself of people and things that are important to me and worth living for:  Clifford Little Donna, post-COVID world, options " of what could be in your future        Step 5: When I am in crisis, I can ask these people to help me use my safety plan:                 Name: Sidney  Step 6: Making the environment safe:   ? go to sleep/daydream  Step 7: Professionals or agencies I can contact during a crisis:  ? [facility] Number: [phone]  ? Suicide Prevention Lifeline: 4-536-197-TALK (5406)  ? Crisis Text Line Service (available 24 hours a day, 7 days a week): Text [place] to 437446    Local Crisis Services: [facility] Crisis: [phone]  Adults can always access to the emPATH unit at [facility] (no phone number, utilize it like an urgent care or ER where you just show up)     Call 911 or go to my nearest emergency department.       I helped develop this safety plan and agree to use it when needed.  I have been given a copy of this plan.       Client signature _________________________________________________________________  Today s date:  11/24/2020  Adapted from Safety Plan Template 2008 Randi Poole and Robby Barba is reprinted with the express permission of the authors.  No portion of the Safety Plan Template may be reproduced without the express, written permission.  You can contact the authors at [email or web address] or [email or web address].   done

## 2023-04-10 NOTE — TELEPHONE ENCOUNTER
[facility] Call Center    Phone Message    May a detailed message be left on voicemail: yes     Reason for Call: Other: Patient is calling stating that she is in a day treatment program and does not want to miss her appointment for tomorrow morning with Dr. Dubois. The patient is wanting to change her appointment to a virtual visit in order to keep her appointment and still go to her day treatment. Please call patient back to verify if this appointment can be done virtually.     Action Taken: Message routed to:  Other: [facility]    Travel Screening: Not Applicable

## 2023-04-11 ENCOUNTER — VIRTUAL VISIT (OUTPATIENT)
Dept: PALLIATIVE MEDICINE | Facility: OTHER | Age: 68
End: 2023-04-11
Attending: ANESTHESIOLOGY
Payer: MEDICARE

## 2023-04-11 ENCOUNTER — HOSPITAL ENCOUNTER (OUTPATIENT)
Dept: BEHAVIORAL HEALTH | Facility: CLINIC | Age: 68
Discharge: HOME | End: 2023-04-11
Attending: PSYCHIATRY & NEUROLOGY
Payer: MEDICARE

## 2023-04-11 DIAGNOSIS — G95.20 CORD COMPRESSION (H): ICD-10-CM

## 2023-04-11 DIAGNOSIS — M54.12 CERVICAL RADICULOPATHY: Chronic | ICD-10-CM

## 2023-04-11 DIAGNOSIS — F31.81 BIPOLAR 2 DISORDER (H): ICD-10-CM

## 2023-04-11 DIAGNOSIS — M25.50 PAIN IN JOINT, MULTIPLE SITES: Primary | ICD-10-CM

## 2023-04-11 DIAGNOSIS — G25.81 RESTLESS LEGS SYNDROME (RLS): ICD-10-CM

## 2023-04-11 PROCEDURE — 99215 OFFICE O/P EST HI 40 MIN: CPT | Mod: VID | Performed by: ANESTHESIOLOGY

## 2023-04-11 PROCEDURE — G0463 HOSPITAL OUTPT CLINIC VISIT: HCPCS | Mod: PN,GT | Performed by: ANESTHESIOLOGY

## 2023-04-11 PROCEDURE — 90853 GROUP PSYCHOTHERAPY: CPT | Mod: GT

## 2023-04-11 PROCEDURE — 90853 GROUP PSYCHOTHERAPY: CPT | Mod: GT | Performed by: SOCIAL WORKER

## 2023-04-11 RX ORDER — ROPINIROLE 2 MG/1
TABLET, FILM COATED ORAL
Qty: 90 TABLET | Refills: 3 | Status: SHIPPED | OUTPATIENT
Start: 2023-04-11 | End: 2023-05-30

## 2023-04-11 RX ORDER — HYDROCODONE BITARTRATE AND ACETAMINOPHEN 5; 325 MG/1; MG/1
1 TABLET ORAL EVERY 6 HOURS PRN
Qty: 112 TABLET | Refills: 0 | Status: SHIPPED | OUTPATIENT
Start: 2023-04-11 | End: 2023-05-30

## 2023-04-11 RX ORDER — HYDROXYZINE HYDROCHLORIDE 25 MG/1
25 TABLET, FILM COATED ORAL EVERY 8 HOURS PRN
Qty: 60 TABLET | Refills: 3 | Status: SHIPPED | OUTPATIENT
Start: 2023-04-11 | End: 2023-05-30

## 2023-04-11 RX ORDER — OXCARBAZEPINE 150 MG/1
TABLET, FILM COATED ORAL
Qty: 45 TABLET | Refills: 3 | Status: SHIPPED | OUTPATIENT
Start: 2023-04-11 | End: 2023-07-05

## 2023-04-11 ASSESSMENT — PAIN SCALES - GENERAL: PAINLEVEL: EXTREME PAIN (8)

## 2023-04-11 NOTE — GROUP NOTE
Process Group Note    PATIENT'S NAME: Randi Cleary  MRN:   [id number]  :   1955  ACCT. NUMBER: [id number]  DATE OF SERVICE: 23  START TIME:  9:00 AM  END TIME:  9:50 AM  FACILITATOR: Brittani Molina [facility]  TOPIC:  Process Group    Diagnoses:   296.33 (F33.2) Major Depressive Disorder, Recurrent Episode, Severe With anxious distress  300.02 (F41.1) Generalized Anxiety Disorder    Rule out:  296.89 Bipolar II Disorder vs. 314.01 (F90.2) Attention-Deficit/Hyperactivity Disorder   Combined presentation         Service Modality:  Video Visit     Telemedicine Visit: The patient's condition can be safely assessed and treated via synchronous audio and visual telemedicine encounter.       Reason for Telemedicine Visit: Services only offered telehealth and due to COVID-19.     Originating Site (Patient Location): Patient's home     Distant Site (Provider Location): Provider Remote Setting- Home Office     Consent:  The patient/guardian has verbally consented to: the potential risks and benefits of telemedicine (video visit) versus in person care; bill my insurance or make self-payment for services provided; and responsibility for payment of non-covered services.      Patient would like the video invitation sent by:  My Chart     Mode of Communication:  Video Conference via Medical Zoom     As the provider I attest to compliance with applicable laws and regulations related to telemedicine.    [facility] 55+ Program  TRACK: A1    NUMBER OF PARTICIPANTS: 7          Data:    Session content: At the start of this group, patients were invited to check in by identifying themselves, describing their current emotional status, and identifying issues to address in this group.   Area(s) of treatment focus addressed in this session included Symptom Management, Personal Safety, Community Resources/Discharge Planning, Abstinence/Relapse Prevention, Develop / Improve Independent Living Skills and Develop  Socialization / Interpersonal Relationship Skills.    Winnie reports less depressed and less anxious mood. Denies S/I or safety issues. Winnie processed how she had a change with an appointment that got moved to a virtual appointment prior to group. She continues to focus on task management and is in process of gathering tax paperwork. Winnie is using coping skills for symptom management. She feels suppprted by the feedback of the group and it is helping for her mental health recovery.      Therapeutic Interventions/Treatment Strategies:  Psychotherapist offered support, feedback and validation and reinforced use of skills. Treatment modalities used include Cognitive Behavioral Therapy.    Assessment:    Patient response:   Patient responded to session by accepting feedback, giving feedback, listening, focusing on goals and being attentive    Possible barriers to participation / learning include: and no barriers identified    Health Issues:   Yes: Chronic disease management, No Psychological Distress       Substance Use Review:   Substance Use: No active concerns identified.    Mental Status/Behavioral Observations  Appearance:   Appropriate   Eye Contact:   Good   Psychomotor Behavior: Normal   Attitude:   Cooperative  Interested  Orientation:   All  Speech   Rate / Production: Normal    Volume:  Normal   Mood:    Less depressed and less anxious mood  Affect:    Appropriate  Tearful  Thought Content:   Clear and Safety denies any current safety concerns including suicidal ideation, self-harm, and homicidal ideation  Thought Form:  Coherent  Goal Directed  Logical     Insight:    Good     Plan:     Safety Plan: No current safety concerns identified.  Recommended that patient call 911 or go to the local ED should there be a change in any of these risk factors.     Barriers to treatment: None identified    Patient Contracts (see media tab):  None    Substance Use: Not addressed in session     Continue or Discharge: Patient  will continue in 55+ Program (55+) as planned. Patient is likely to benefit from learning and using skills as they work toward the goals identified in their treatment plan.      Brittani Molina, [facility]  April 11, 2023

## 2023-04-11 NOTE — PROGRESS NOTES
[facility] - Office Visit    ASSESSMENT & PLAN     Randi was seen today for pain.    Diagnoses and all orders for this visit:    Pain in joint, multiple sites  -     SALVADOR Scrn Rflx to [facility] ([facility])  -     Tumor Necrosis Factor Alpha Blood; Future  -     Interleukin 6 Blood; Future    Cervical radiculopathy  -     Neurosurgery Referral; Future    Bipolar 2 disorder (H)  -     OXcarbazepine (TRILEPTAL) 150 MG tablet; One and one-half tab bedtime  -     hydrOXYzine (ATARAX) 25 MG tablet; Take 1 tablet (25 mg) by mouth every 8 hours as needed for anxiety    Cord compression (H)  -     HYDROcodone-acetaminophen (NORCO) 5-325 MG tablet; Take 1 tablet by mouth every 6 hours as needed for breakthrough pain or moderate to severe pain May fill 4/30 for 5/2    Restless legs syndrome (RLS)  -     rOPINIRole (REQUIP) 2 MG tablet; One tab 3 pm, one bedtime, and one during night on waking        Patient Instructions       ----------------------------------------------------------------  Clinic Number:  [phone]     Call with any questions about your care and for scheduling assistance.     Calls are returned Monday through Friday between 8 AM and 4:30 PM. We usually get back to you within 2 business days depending on the issue/request.    If we are prescribing your medications:    For opioid medication refills, call the clinic or send a ROBAUTO message 7 days in advance.  Please include:    Name of requested medication    Name of the pharmacy.    For non-opioid medications, call your pharmacy directly to request a refill. Please allow 3-4 days to be processed.     Per [place] State Law:    All controlled substance prescriptions must be filled within 30 days of being written.      For those controlled substances allowing refills, pickup must occur within 30 days of last fill.      We believe regular attendance is key to your success in our program!      Any time you are unable to keep your appointment we  "ask that you call us at least 24 hours in advance to cancel.This will allow us to offer the appointment time to another patient.     Multiple missed appointments may lead to dismissal from the clinic.    PLAN:  Labs ordered for concern for osteoarthritis and other autoimmune conditions.    Discussed you have an appointment with rheumatologist in June.    Oxcarbazepine 1 to 50 mg 1-1/2 at bedtime for mood stabilization.    Hydroxyzine 25 mg every 8 hours as needed for anxiety.    Dr. Wiggins to speak with Dr. Das regarding psychotropic medications while you are in the program.    Referral made to Dr. Caballero of neurosurgery to evaluate your neck surgery.    You are scheduled for an EMG for your hands follow-up with [facility].    Discussed the use of high-dose herbal anti-inflammatories such as \"Turiva\" to help with your joint pain.  May obtain at the [facility] pharmacy.    A occipital nerve block will be ordered for Dr. Jackson.        Follow-up with Dr. Wiggins in the office in 6 to 8 weeks.          -----  AXEL WIGGINS MD  [facility]       SUBJECTIVE      Randi Cleary is a 67 year old year seen by video follow-up for history of arthralgias, cervical radiculopathy, and mood disorder.    Reviewed she is presently in a group program for \"55 and older\".  She has had some increased anxiety, panic attacks.  She is working with her psychiatrist they are reviewing that she has had several stressors including cervical surgery, financial stressors loss of her primary care provider.    She did have benefit evaluation endocrinology, not thought to have return of a pheochromocytoma.    She has been working with [facility].  Feels she is gotten decrease use of 50% of her hands.  She demonstrates on her left hand, third finger, the tendon slides off laterally.  Has been having braces.  She is scheduled for EMG next month.  She feels her neck is still unstable " "having had surgery for cervical radiculopathy and found to have some ongoing cervical stenosis.  Reports that she saw the PA and there orthopedic surgery for the neck who said they would not \"touch\" her neck, told her to go back to neurology.  She is holding onto physical therapy for the present.    She continues to feel her shoulders also come out of place.    Continues to feel she is losing strength in her legs, sometimes the balance.  Has had bladder changes since she had chemotherapy for cancer but feels it may be changing.  Feels the neuropathy in her legs is moving up to her knees.  Has not had any falls.    She is concerned having had some episodes of psoriasis with a longstanding problem, has had evaluations related to arthritis.  Reviewing the record had some inflammatory markers checked in January showed normal.  She has found a referral to a rheumatologist through [facility] to see in June.    Reviews her previous psychiatrist Dr. Serrano is nonpracticing.  She was wanting to look into past records for medications she has tried after discussion with the psychiatrist at the pain program.  He was in private practice and will be a challenge.  Past medications will be less helpful than addressing the present.  We have tried lithium and different preparations which she stopped did not feel it helpful.  When last seen she has been using Trileptal 150 mg at bedtime.  States she that over the last month, may be doing worse.  Stopped it because she was \"overwhelmed\" with meds.  Still has some mood instability.    Wonders about using hydroxyzine which was prescribed in the past did not pick it up.    She continues with the Requip 2 mg in the afternoon bedtime and then again when she wakes with some benefit.    Continues with the hydrocodone 5 mg 4 times a day which is given some benefit.  She does not like taking opioids feels as needed.    She would like to repeat of the occipital nerve block she had in the end " of December helpful for headaches.    She notes with all the stressors she continues sober.  Continues working with therapy primary care provider.    We discussed that osteoarthritis of the handsmaystillbeanautoimmune.  Reviewed using herbal anti-inflammatories as we would want to avoid nonsteroidals longer term and she will try some until she sees the rheumatologist.    Last urine drug test in November  reviewed    Current Outpatient Medications:      acetaminophen (TYLENOL) 325 MG tablet, Take 2 tablets (650 mg) by mouth every 4 hours as needed for other (For optimal non-opioid multimodal pain management to improve pain control.), Disp: , Rfl:      albuterol (PROVENTIL) (2.5 MG/3ML) 0.083% neb solution, Take 1 vial (2.5 mg) by nebulization every 4 hours as needed for shortness of breath / dyspnea or wheezing, Disp: 360 mL, Rfl: 5     albuterol (VENTOLIN HFA) 108 (90 Base) MCG/ACT inhaler, Inhale 2 puffs into the lungs every 6 hours, Disp: 18 g, Rfl: 11     Cholecalciferol (VITAMIN D3) 250 MCG (83806 UT) TABS, Take 1 tablet by mouth daily 67100/day, Disp: , Rfl:      Cyanocobalamin (VITAMIN B-12) 5000 MCG SUBL, Place 2-3 sprays under the tongue daily Unknown dose. 2 or 3 sprays/day, Disp: , Rfl:      ethacrynic acid (EDECRIN) 25 MG tablet, Take 1 tablet (25 mg) by mouth every other day, Disp: 45 tablet, Rfl: 3     Fluticasone-Umeclidin-Vilanterol (TRELEGY ELLIPTA) 200-62.5-25 MCG/INH oral inhaler, Inhale 1 puff into the lungs daily, Disp: 4 each, Rfl: 3     HYDROcodone-acetaminophen (NORCO) 5-325 MG tablet, Take 1 tablet by mouth every 6 hours as needed for breakthrough pain or moderate to severe pain May fill 4/30 for 5/2, Disp: 112 tablet, Rfl: 0     hydrOXYzine (ATARAX) 25 MG tablet, Take 1 tablet (25 mg) by mouth every 8 hours as needed for anxiety, Disp: 60 tablet, Rfl: 3     KLOR-CON 20 MEQ CR tablet, Take 1 tablet (20 mEq total) by mouth 2 (two) times a day., Disp: 180 tablet, Rfl: 0     LITHIUM PO, Take  25 mg by mouth daily OTC lithium oratate, Disp: , Rfl:      magnesium 250 MG tablet, Take 1 tablet by mouth 2 times daily, Disp: , Rfl:      medical cannabis (Patient's own supply), See Admin Instructions (The purpose of this order is to document that the patient reports taking medical cannabis.  This is not a prescription, and is not used to certify that the patient has a qualifying medical condition.) Flower, Disp: , Rfl:      methocarbamol (ROBAXIN) 500 MG tablet, Take 1 tablet (500 mg) by mouth 3 times daily, Disp: 90 tablet, Rfl: 3     omeprazole (PRILOSEC) 20 MG DR capsule, Take 1 capsule (20 mg) by mouth daily, Disp: 90 capsule, Rfl: 3     OXcarbazepine (TRILEPTAL) 150 MG tablet, One and one-half tab bedtime, Disp: 45 tablet, Rfl: 3     rOPINIRole (REQUIP) 2 MG tablet, One tab 3 pm, one bedtime, and one during night on waking, Disp: 90 tablet, Rfl: 3     SYNTHROID 150 MCG tablet, Take 1 tablet (150 mcg) by mouth daily, Disp: 90 tablet, Rfl: 4     vitamin E 400 units TABS, Take 800 Units by mouth daily, Disp: , Rfl:         Review of Systems   General, psych, musculoskeletal, bowels and bladder otherwise normal other than above.          OBJECTIVE        Physical Exam  General: Alert, clear sensorium.  No respiratory distress.  No pain behavior.  Cardiovascular: Normal rate  Lungs: Pulmonary effort is normal, speaking in full sentences  MSK: Demonstrates that her hands do indeed appear to have lost some muscle mass, and when she flexes her left hand tendon on the third PIP slides laterally.  Skin:. No concerning rashes or lesions.  Neurologic: No focal deficit, alert and oriented x3  Psychiatric: Tearful, speech normal rate and rhythm.  Constricted affect    Assessment: Patient with a history of cervical surgery, CT December showed continued central stenosis.  She is concerning upper extremity symptoms of numbness, atrophy in the hands, follow-up with orthopedics to have an EMG.  Lower extremity weakness she  states this is a ongoing problem.  Difficult to assess bowel bladder changes.  Reports cervical surgeons at [facility] referred her back to neurosurgery.  She would like a second opinion from the neurosurgery.    Bipolar spectrum disorder, significant stressors, past history of pheochromocytoma related to her anxiety with monitoring internal stimuli, bipolar spectrum condition, sobered up alcohol.  Involvement more intensive outpatient program.    I suggested she resume the oxcarbazepine to 150 mg 1-1/2 at bedtime as she has at least 3 weeks more to monitor her program I will communicate with the psychiatrist program.  Recommend she transition establish with a primary psychiatrist previous not available.    Total time 55-minute

## 2023-04-11 NOTE — PATIENT INSTRUCTIONS
"    ----------------------------------------------------------------  Clinic Number:  [phone]   Call with any questions about your care and for scheduling assistance.   Calls are returned Monday through Friday between 8 AM and 4:30 PM. We usually get back to you within 2 business days depending on the issue/request.    If we are prescribing your medications:  For opioid medication refills, call the clinic or send a VetCloud message 7 days in advance.  Please include:  Name of requested medication  Name of the pharmacy.  For non-opioid medications, call your pharmacy directly to request a refill. Please allow 3-4 days to be processed.   Per [place] State Law:  All controlled substance prescriptions must be filled within 30 days of being written.    For those controlled substances allowing refills, pickup must occur within 30 days of last fill.      We believe regular attendance is key to your success in our program!    Any time you are unable to keep your appointment we ask that you call us at least 24 hours in advance to cancel.This will allow us to offer the appointment time to another patient.   Multiple missed appointments may lead to dismissal from the clinic.    PLAN:  Labs ordered for concern for osteoarthritis and other autoimmune conditions.    Discussed you have an appointment with rheumatologist in June.    Oxcarbazepine 1 to 50 mg 1-1/2 at bedtime for mood stabilization.    Hydroxyzine 25 mg every 8 hours as needed for anxiety.    Dr. Dubois to speak with Dr. Das regarding psychotropic medications while you are in the program.    Referral made to Dr. Caballero of neurosurgery to evaluate your neck surgery.    You are scheduled for an EMG for your hands follow-up with [facility].    Discussed the use of high-dose herbal anti-inflammatories such as \"Turiva\" to help with your joint pain.  May obtain at the [facility] pharmacy.    A occipital nerve block will be ordered for Dr." Manuel.        Follow-up with Dr. Dubois in the office in 6 to 8 weeks.

## 2023-04-11 NOTE — GROUP NOTE
Psychoeducation Group Note    PATIENT'S NAME: aRndi Cleary  MRN:   [id number]  :   1955  ACCT. NUMBER: [id number]  DATE OF SERVICE: 23  START TIME: 10:00 AM  END TIME: 10:50 AM  FACILITATOR: Brittani Molina LICSW  TOPIC: MH Wellness Group: Health Maintenance  Service Modality:  Video Visit     Telemedicine Visit: The patient's condition can be safely assessed and treated via synchronous audio and visual telemedicine encounter.       Reason for Telemedicine Visit: Services only offered telehealth and due to COVID-19.     Originating Site (Patient Location): Patient's home     Distant Site (Provider Location): Provider Remote Setting- Home Office     Consent:  The patient/guardian has verbally consented to: the potential risks and benefits of telemedicine (video visit) versus in person care; bill my insurance or make self-payment for services provided; and responsibility for payment of non-covered services.      Patient would like the video invitation sent by:  My Chart     Mode of Communication:  Video Conference via Medical Zoom     As the provider I attest to compliance with applicable laws and regulations related to telemedicine.     [facility]+ [facility]  TRACK: A1    NUMBER OF PARTICIPANTS: 7    Summary of Group / Topics Discussed:  Health Maintenance: Goal Setting: Meaningful goals can bring a sense of direction and purpose in life.  They also highlight our most important values. Patients were assisted by instructor to identify short term goals to promote their mental health recovery and improve overall health and wellness. Patients were educated on SMART goal setting framework as a strategy to increase outcomes and promote success.    Patient Session Goals / Objectives:  ? Explained the key concepts of SMART goal setting framework  ? Identified three goals successfully using SMART goal setting framework  ? Reviewed concept of balance in wellness as it pertains to goal setting         Patient Participation / Response:  Fully participated with the group by sharing personal reflections / insights and openly received / provided feedback with other participants.    Demonstrated understanding of topics discussed through group discussion and participation and Verbalized understanding of health maintenance topic    Treatment Plan:  Patient has a current master individualized treatment plan.  See Epic treatment plan for more information.    Brittani Molina, LICSW

## 2023-04-11 NOTE — PROGRESS NOTES
Patient presents to the clinic today for a follow up with AXEL WIGGINS MD regarding Pain Management.    Winnie is a 67 year old who is being evaluated via a billable video visit.      How would you like to obtain your AVS? MyChart and mail  If the video visit is dropped, the invitation should be resent by: Send to e-mail at: [email or web address]  Will anyone else be joining your video visit? No        Video-Visit Details    Type of service:  Video Visit   Video Start Time:   Video End Time:    Originating Location (pt. Location): Home    Distant Location (provider location):  On-site  Platform used for Video Visit: CliffWell        Is Pt currently in [place]? Yes  NOTE: If Pt is not in [place], Appointment needs to be canceled and rescheduled          6/22/2022    11:45 AM 8/12/2022     8:40 AM 4/11/2023     8:01 AM   PEG Score   PEG Total Score 7.67 8.33 8       UDS/CSA-    QUESTIONS:    Lorena GOODWIN [facility] Patient Facilitator

## 2023-04-11 NOTE — GROUP NOTE
Psychotherapy Group Note    PATIENT'S NAME: Randi Cleary  MRN:   [id number]  :   1955  ACCT. NUMBER: [id number]  DATE OF SERVICE: 23  START TIME: 11:00 AM  END TIME: 11:50 AM  FACILITATOR: Luh Hare LGSW  TOPIC: MH EBP Group: Self-Awareness  [facility]+ [facility]  TRACK: A1    NUMBER OF PARTICIPANTS: 7    Summary of Group / Topics Discussed:  Self-Awareness: Values: Patients identified personal values by examining development of their current values and how their values influence their daily functioning and life choices. Patients explored the impact of their values on their thoughts, feelings, and actions. Patients discussed definition of personal values and how they develop and change over time. The goal is to help patients reconcile value conflicts and achieve balance and flexibility to improve mood and daily functioning.     Patient Session Goals / Objectives:    Examined development of values and impact of values on functioning    Identified and prioritized important values related to current well-being     Identified strategies to change or enhance values to positively impact symptoms    Assisted patients to find ways to adapt functioning to better fit their values                                      Service Modality:  Video Visit     Telemedicine Visit: The patient's condition can be safely assessed and treated via synchronous audio and visual telemedicine encounter.      Reason for Telemedicine Visit: Services only offered telehealth    Originating Site (Patient Location): Patient's home    Distant Site (Provider Location): [facility]: [facility]    Consent:  The patient/guardian has verbally consented to: the potential risks and benefits of telemedicine (video visit) versus in person care; bill my insurance or make self-payment for services provided; and responsibility for payment of non-covered services.     Patient would like the video invitation sent by:  My  Chart    Mode of Communication:  Video Conference via Medical Zoom    As the provider I attest to compliance with applicable laws and regulations related to telemedicine.                               Patient Participation / Response:  Fully participated with the group by sharing personal reflections / insights and openly received / provided feedback with other participants.    Demonstrated understanding of topics discussed through group discussion and participation, Demonstrated understanding of values, strengths, and challenges to learn about themselves, Identified / Expressed readiness to act intentionally, increase self-compassion, promote personal growth, Verbalized understanding of ways to proactively manage illness and Identified plan to address barriers to practicing skills that promote self-awareness     Treatment Plan:  Patient has a current master individualized treatment plan.  See Epic treatment plan for more information.    Luh Hare LGSW

## 2023-04-12 ENCOUNTER — HOSPITAL ENCOUNTER (OUTPATIENT)
Dept: BEHAVIORAL HEALTH | Facility: CLINIC | Age: 68
Discharge: HOME | End: 2023-04-12
Attending: PSYCHIATRY & NEUROLOGY
Payer: MEDICARE

## 2023-04-12 ENCOUNTER — TELEPHONE (OUTPATIENT)
Dept: NEUROSURGERY | Facility: CLINIC | Age: 68
End: 2023-04-12
Payer: MEDICARE

## 2023-04-12 PROCEDURE — 90853 GROUP PSYCHOTHERAPY: CPT | Mod: GT | Performed by: SOCIAL WORKER

## 2023-04-12 PROCEDURE — 90853 GROUP PSYCHOTHERAPY: CPT | Mod: GT

## 2023-04-12 NOTE — GROUP NOTE
Psychotherapy Group Note    PATIENT'S NAME: Randi Cleary  MRN:   [id number]  :   1955  ACCT. NUMBER: [id number]  DATE OF SERVICE: 23  START TIME: 10:00 AM  END TIME: 10:50 AM  FACILITATOR: Brittani Molina LICSW  TOPIC: MH EBP Group: Symptom Awareness  Service Modality:  Video Visit     Telemedicine Visit: The patient's condition can be safely assessed and treated via synchronous audio and visual telemedicine encounter.       Reason for Telemedicine Visit: Services only offered telehealth and due to COVID-19.     Originating Site (Patient Location): Patient's home     Distant Site (Provider Location): Provider Remote Setting- Home Office     Consent:  The patient/guardian has verbally consented to: the potential risks and benefits of telemedicine (video visit) versus in person care; bill my insurance or make self-payment for services provided; and responsibility for payment of non-covered services.      Patient would like the video invitation sent by:  My Chart     Mode of Communication:  Video Conference via Medical Zoom     As the provider I attest to compliance with applicable laws and regulations related to telemedicine.     [facility]+ [facility]  TRACK: A1    NUMBER OF PARTICIPANTS: 7    Summary of Group / Topics Discussed:  Symptom Awareness: Symptom Observation and Tracking: An overview of symptom observation and tracking was presented to help patients identify specific symptoms and identify patterns. This topic will also assist patient in identifying progress towards goal of decreasing severity of symptoms and increasing overall functioning. Patients completed a symptom check list in session. Patient was assisted in identifying baseline functioning, patterns, and ways to assess current symptoms. Patient was also assisted in identifying a tool or strategy to continue to track or monitor symptoms over a period of time.       Patient Session Goals / Objectives:    Identified  patient individual symptoms and experiences    Identified potential symptom patterns and factors that contribute to changes in symptom severity      Patient Participation / Response:  Fully participated with the group by sharing personal reflections / insights and openly received / provided feedback with other participants.    Demonstrated understanding of topics discussed through group discussion and participation and Demonstrated understanding of how information regarding symptoms can assist in management of symptoms    Treatment Plan:  Patient has a current master individualized treatment plan.  See Epic treatment plan for more information.    Brittani Molina, LICSW

## 2023-04-12 NOTE — GROUP NOTE
Process Group Note    PATIENT'S NAME: Randi Cleary  MRN:   [id number]  :   1955  ACCT. NUMBER: [id number]  DATE OF SERVICE: 23  START TIME:  9:00 AM  END TIME:  9:50 AM  FACILITATOR: Brittani Molina [facility]  TOPIC:  Process Group    Diagnoses:   296.33 (F33.2) Major Depressive Disorder, Recurrent Episode, Severe With anxious distress  300.02 (F41.1) Generalized Anxiety Disorder    Rule out:  296.89 Bipolar II Disorder vs. 314.01 (F90.2) Attention-Deficit/Hyperactivity Disorder   Combined presentation         Service Modality:  Video Visit     Telemedicine Visit: The patient's condition can be safely assessed and treated via synchronous audio and visual telemedicine encounter.       Reason for Telemedicine Visit: Services only offered telehealth and due to COVID-19.     Originating Site (Patient Location): Patient's home     Distant Site (Provider Location): Provider Remote Setting- Home Office     Consent:  The patient/guardian has verbally consented to: the potential risks and benefits of telemedicine (video visit) versus in person care; bill my insurance or make self-payment for services provided; and responsibility for payment of non-covered services.      Patient would like the video invitation sent by:  My Chart     Mode of Communication:  Video Conference via Medical Zoom     As the provider I attest to compliance with applicable laws and regulations related to telemedicine.    [facility] 55+ Program  TRACK: A1    NUMBER OF PARTICIPANTS: 7          Data:    Session content: At the start of this group, patients were invited to check in by identifying themselves, describing their current emotional status, and identifying issues to address in this group.   Area(s) of treatment focus addressed in this session included Symptom Management, Personal Safety, Community Resources/Discharge Planning, Abstinence/Relapse Prevention, Develop / Improve Independent Living Skills and Develop  Socialization / Interpersonal Relationship Skills.    Winnie reports less depressed and less anxious mood. Denies S/I or safety issues. She reported on symptom indicators. She is using coping skills for symptom management. She reports medication compliance. Winnie continues to cope with co-morbid physical health issues. She is focused on tax prep.      Therapeutic Interventions/Treatment Strategies:  Psychotherapist offered support, feedback and validation and reinforced use of skills. Treatment modalities used include Cognitive Behavioral Therapy.    Assessment:    Patient response:   Patient responded to session by accepting feedback, giving feedback, listening, focusing on goals, being attentive and accepting support    Possible barriers to participation / learning include: and no barriers identified    Health Issues:   Yes: Chronic disease management, No Psychological Distress     Medication compliance.       Substance Use Review:   Substance Use: No active concerns identified.    Mental Status/Behavioral Observations  Appearance:   Appropriate   Eye Contact:   Good   Psychomotor Behavior: Normal   Attitude:   Cooperative  Interested  Orientation:   All  Speech   Rate / Production: Normal    Volume:  Normal   Mood:    Less depressed and less anxious mood  Affect:    Appropriate   Thought Content:   Clear and Safety denies any current safety concerns including suicidal ideation, self-harm, and homicidal ideation  Thought Form:  Coherent  Goal Directed     Insight:    Good     Plan:     Safety Plan: No current safety concerns identified.  Recommended that patient call 911 or go to the local ED should there be a change in any of these risk factors.     Barriers to treatment: None identified    Patient Contracts (see media tab):  None    Substance Use: Not addressed in session     Continue or Discharge: Patient will continue in 55+ Program (55+) as planned. Patient is likely to benefit from learning and using skills as  they work toward the goals identified in their treatment plan.      Brittani Molina, [facility]  April 12, 2023

## 2023-04-12 NOTE — GROUP NOTE
Psychotherapy Group Note    PATIENT'S NAME: Randi Cleary  MRN:   [id number]  :   1955  ACCT. NUMBER: [id number]  DATE OF SERVICE: 23  START TIME: 11:00 AM  END TIME: 11:50 AM  FACILITATOR: Luh Hare LGSW  TOPIC: MH EBP Group: Symptom Awareness  [facility] 55+ Program  TRACK: A1    NUMBER OF PARTICIPANTS: 5    Summary of Group / Topics Discussed:  Symptom Awareness: Mood Disorders: Patients received a general overview of mood disorders including depressive disorders, anxiety disorders, and bipolar disorders and how it relates to their current symptoms. The purpose is to promote understanding of their diagnoses and how it impacts their functioning. Patients reviewed their current awareness of symptoms and diagnoses. Patients received information regarding diagnoses, etiology, cultural, and environmental factors as well as impact on functioning.     Patient Session Goals / Objectives:    Discussed patient individual symptoms and experiences    Reviewed diagnostic criteria and etiology of diagnoses                                       Service Modality:  Video Visit     Telemedicine Visit: The patient's condition can be safely assessed and treated via synchronous audio and visual telemedicine encounter.      Reason for Telemedicine Visit: Services only offered telehealth    Originating Site (Patient Location): Patient's home    Distant Site (Provider Location): Provider Remote Setting- Home Office    Consent:  The patient/guardian has verbally consented to: the potential risks and benefits of telemedicine (video visit) versus in person care; bill my insurance or make self-payment for services provided; and responsibility for payment of non-covered services.     Patient would like the video invitation sent by:  My Chart    Mode of Communication:  Video Conference via Medical Zoom    As the provider I attest to compliance with applicable laws and regulations related to telemedicine.                                Patient Participation / Response:  Fully participated with the group by sharing personal reflections / insights and openly received / provided feedback with other participants.    Demonstrated understanding of topics discussed through group discussion and participation, Demonstrated understanding of how information regarding symptoms can assist in management of symptoms, Identified / Expressed personal readiness to increase awareness of symptoms and apply skills as necessary, Verbalized understanding of how awareness can benefit mental health symptoms  and Identified plan to address barriers to increase awareness and knowledge about diagnoses and symptoms     Treatment Plan:  Patient has a current master individualized treatment plan.  See Epic treatment plan for more information.    Luh Hare LGSW

## 2023-04-13 NOTE — TELEPHONE ENCOUNTER
Records Requested     April 13, 2023 8:44 AM  [id number]   Facility  [facility] fx. [phone]  CDI/[facility]ight MRN: [id number]     Outcome CT report sent to scan, sent a fax for images to be pushed      Action 4/18/23 MV 9.26am   Action Taken 4/30/16 MRI Cervical resolved from [facility]. Sent 2nd request for CT soft tissue neck images     Action 4/20/23 MV 11.10am   Action Taken Images resolved in PACS             SPINE PATIENTS - NEW PROTOCOL PREVISIT    RECORDS RECEIVED FROM:    REASON FOR VISIT: Cervical radiculopathy/ Epic/Pacs. -KB   Date of Appt: 4/27/23   NOTES (FOR ALL VISITS) STATUS DETAILS   OFFICE NOTE from referring provider Internal 4/11/23 OV Dusty Dubois MD   OFFICE NOTE from other specialist Internal 11/17/22 OV Zandra Davidson, DION Olea - scanned in Epic   2/16/23 OV    OPERATIVE REPORT Internal 12/21/21 CERVICAL 3-CERVICAL 6 LEFT OPEN DOOR LAMINOPLASTY AND LEFT CERVICAL 4-5 AND CERVICAL 6-7 POSTERIOR FORAMINOTOMY     EMG REPORT Internal 12/21/21 Nuvasive - scanned in Epic    MEDICATION LIST Internal    IMAGING  (FOR ALL VISITS)     XRAY (SPINE) *NEUROSURGERY* Internal Internal  XR Cervical spine: 2/3/22, 12/22/21   CT (HEAD, NECK, SPINE) PACS Internal   CT Cervical: 12/2/22    [facility]  CT NECK : 4/8/22

## 2023-04-14 ENCOUNTER — VIRTUAL VISIT (OUTPATIENT)
Dept: PSYCHOLOGY | Facility: CLINIC | Age: 68
End: 2023-04-14
Payer: MEDICARE

## 2023-04-14 ENCOUNTER — HOSPITAL ENCOUNTER (OUTPATIENT)
Dept: BEHAVIORAL HEALTH | Facility: CLINIC | Age: 68
Discharge: HOME | End: 2023-04-14
Attending: PSYCHIATRY & NEUROLOGY
Payer: MEDICARE

## 2023-04-14 DIAGNOSIS — F43.9 TRAUMA AND STRESSOR-RELATED DISORDER: ICD-10-CM

## 2023-04-14 DIAGNOSIS — F41.1 GENERALIZED ANXIETY DISORDER: ICD-10-CM

## 2023-04-14 DIAGNOSIS — F31.81 BIPOLAR 2 DISORDER (H): Primary | ICD-10-CM

## 2023-04-14 PROCEDURE — 90837 PSYTX W PT 60 MINUTES: CPT | Mod: VID | Performed by: SOCIAL WORKER

## 2023-04-14 PROCEDURE — 90853 GROUP PSYCHOTHERAPY: CPT | Mod: GT

## 2023-04-14 PROCEDURE — 90853 GROUP PSYCHOTHERAPY: CPT | Mod: GT | Performed by: SOCIAL WORKER

## 2023-04-14 NOTE — GROUP NOTE
Psychotherapy Group Note    PATIENT'S NAME: Randi Cleary  MRN:   [id number]  :   1955  ACCT. NUMBER: [id number]  DATE OF SERVICE: 23  START TIME: 11:00 AM  END TIME: 11:50 AM  FACILITATOR: Luh Hare LGSW  TOPIC: MH EBP Group: Mindfulness  [facility] 55+ Program  TRACK: A1    NUMBER OF PARTICIPANTS: 6    Summary of Group / Topics Discussed:  Mindfulness: Mindfulness Experiential: Working Session Patients received an overview on what mindfulness is and how mindfulness can benefit general health, mental health symptoms, and stressors. Patients discussed current awareness, knowledge, and practice of mindfulness skills. Patients also discussed barriers to mindfulness practice.  Patients took 20 minutes to complete a task mindfully before sharing observations with group afterwards.     Patient Session Goals / Objectives:    Completed a task mindfully    Demonstrated and verbalized understanding of key mindfulness concepts    Identified when/how to use mindfulness skills    Resolved barriers to practicing mindfulness skills    Identified plan to use mindfulness skills in daily life                                     Service Modality:  Video Visit     Telemedicine Visit: The patient's condition can be safely assessed and treated via synchronous audio and visual telemedicine encounter.      Reason for Telemedicine Visit: Services only offered telehealth    Originating Site (Patient Location): Patient's home    Distant Site (Provider Location): Provider Remote Setting- Home Office    Consent:  The patient/guardian has verbally consented to: the potential risks and benefits of telemedicine (video visit) versus in person care; bill my insurance or make self-payment for services provided; and responsibility for payment of non-covered services.     Patient would like the video invitation sent by:  My Chart    Mode of Communication:  Video Conference via Medical Zoom    As the provider I attest to  compliance with applicable laws and regulations related to telemedicine.                               Patient Participation / Response:  Fully participated with the group by sharing personal reflections / insights and openly received / provided feedback with other participants.    Demonstrated understanding of topics discussed through group discussion and participation, Demonstrated understanding of mindfulness skills and benefits of practice, Identified plan to address barriers to practicing mindfulness skills  and Practiced skills in session    Treatment Plan:  Patient has a current master individualized treatment plan.  See Epic treatment plan for more information.    Luh Hare LGSW

## 2023-04-14 NOTE — PROGRESS NOTES
[facility]                                     Progress Note    Patient Name: Randi Cleary  Date: 4/14/23         Service Type: Individual     Session Start Time: 12:32 PM  Session End Time: 1:25 PM     Session Length: 53 minutes    Session #: 172    Attendees: Client attended alone    Service Modality:  Video Visit:      Provider verified identity through the following two step process.  Patient provided:  Patient is known previously to provider    Telemedicine Visit: The patient's condition can be safely assessed and treated via synchronous audio and visual telemedicine encounter.      Reason for Telemedicine Visit: Patient convenience (e.g. access to timely appointments / distance to available provider)    Originating Site (Patient Location): Patient's home    Distant Site (Provider Location): Provider Remote Setting- Home Office    Consent:  The patient/guardian has verbally consented to: the potential risks and benefits of telemedicine (video visit) versus in person care; bill my insurance or make self-payment for services provided; and responsibility for payment of non-covered services.     Patient would like the video invitation sent by:  My Chart    Mode of Communication:  Video Conference via [facility]    Distant Location (Provider):  Off-site    As the provider I attest to compliance with applicable laws and regulations related to telemedicine.      DATA  Extended Session (53+ minutes): PROLONGED SERVICE IN THE OUTPATIENT SETTING REQUIRING DIRECT (FACE-TO-FACE) PATIENT CONTACT BEYOND THE USUAL SERVICE:    - Patient's presenting concerns require more intensive intervention than could be completed within the usual service  Interactive Complexity: No  Crisis: No        Progress Since Last Session (Related to Symptoms / Goals / Homework):   Symptoms: Still having high anxiety and distress    Homework: Progress made  Decide next plan of action for your health  Keep getting out in the  community    In the future:  Look at a budget  Get back to the pool  Talk to your primary care doctor about what's on your list (mood, neck, arthritis)           Episode of Care Goals: Satisfactory progress - ACTION (Actively working towards change); Intervened by reinforcing change plan / affirming steps taken     Current / Ongoing Stressors and Concerns:   Patient is currently socially isolated. She has a conflictual relationship with her .  She is getting minimal physical activity.  She has had several surgeries.      Treatment Objective(s) Addressed in This Session:   Patient will increase frequency of engaging her in ADLs.  Patient will track and record at least 5 pleasant exchanges with . Patient will be able to identify at least 5 positive traits about her .  Patient will reduce level of depressive and anxious features as evidenced by reduction in score on her CHAVO-7 and PHQ-9 (scores of 15 and 16 at first measurment, respectively).     Intervention:   Supportive Therapy: Identified health concerns and steps being taken to resolve them. Talked about mental health and what was impacting this. Identified some challenges in her primary relationship and how to handle these to help with mental health. Reviewed the action steps she is taking in her life, primarily with her finances, which is positively impacting her mental health.      The following assessments were completed by patient for this visit:  PHQ9:       1/30/2023     1:48 PM 2/5/2023     9:43 PM 2/14/2023     2:22 PM 2/27/2023    10:44 AM 3/13/2023     8:02 AM 3/20/2023    10:35 AM 4/7/2023    12:25 PM   PHQ-9 SCORE   PHQ-9 Total Score MyChart 13 (Moderate depression) 18 (Moderately severe depression) 18 (Moderately severe depression) 20 (Severe depression) 16 (Moderately severe depression) 15 (Moderately severe depression) 14 (Moderate depression)   PHQ-9 Total Score 13 18 18 20 16 15 14     GAD7:       1/9/2023    12:30 PM  1/30/2023     1:49 PM 2/5/2023     9:49 PM 2/20/2023    10:48 AM 3/9/2023     9:27 AM 3/24/2023     9:06 AM 4/7/2023    12:26 PM   CHAVO-7 SCORE   Total Score  17 (severe anxiety) 16 (severe anxiety) 17 (severe anxiety) 15 (severe anxiety) 15 (severe anxiety) 10 (moderate anxiety)   Total Score 16 17 16 17    17    17    17    17 15    15    15 15    15    15    15 10     PROMIS 10-Global Health (only subscores and total score):       9/6/2022    10:29 AM 12/15/2022    12:10 PM 12/27/2022     1:17 AM 1/5/2023    11:28 AM 2/5/2023     9:48 PM 3/9/2023     9:30 AM 4/7/2023    12:28 PM   PROMIS-10 Scores Only   Global Mental Health Score 7    7    7 6    6    6 6    6    6 5    5        5 5    5    5    5    5 6    6    6 7    7    7    7   Global Physical Health Score 9    9    9 7    7    7 8    8    8 8    8        8 8    8    8    8    8 6    6    6 8    8    8    8   PROMIS TOTAL - SUBSCORES 16    16    16 13    13    13 14    14    14 13    13        13 13    13    13    13    13 12    12    12 15    15    15    15       Information is confidential and restricted. Go to Review Flowsheets to unlock data.    Multiple values from one day are sorted in reverse-chronological order        ASSESSMENT: Current Emotional / Mental Status (status of significant symptoms):   Risk status (Self / Other harm or suicidal ideation)   Patient denies current fears or concerns for personal safety.   Patient denies current or recent suicidal ideation or behaviors.   Patient denies current or recent homicidal ideation or behaviors.   Patient denies current or recent self injurious behavior or ideation.   Patient denies other safety concerns.   Patient reports there has been no change in risk factors since their last session.     Patient reports there has been no change in protective factors since their last session.     A safety and risk management plan has been developed including: Patient consented to co-developed safety plan on  11/24/2020, updated 2/20/23.  Safety and risk management plan was reviewed.   Patient agreed to use safety plan should any safety concerns arise.  A copy was made available to the patient.     Appearance:   Appropriate    Eye Contact:   Good    Psychomotor Behavior: Normal    Attitude:   Cooperative    Orientation:   All   Speech    Rate / Production: Normal/ Responsive    Volume:  Normal    Mood:    Normal   Affect:    Appropriate    Thought Content:  Clear    Thought Form:  Coherent    Insight:    Good      Medication Review:   No changes to current psychiatric medication(s)     Medication Compliance:   Yes     Changes in Health Issues:   None reported     Chemical Use Review:   Substance Use: Chemical use reviewed, no active concerns identified Nothing used since August 2021.     Tobacco Use: No current tobacco use.      Diagnosis:  1. Bipolar 2 disorder (H)    2. Generalized anxiety disorder    3. Trauma and stressor-related disorder      Collateral Reports Completed:   Not Applicable    PLAN: (Patient Tasks / Therapist Tasks / Other)  Decide next plan of action for your health  Keep getting out in the community    In the future:  Look at a budget  Get back to the pool  Talk to your primary care doctor about what's on your list (mood, neck, arthritis)        There has been demonstrated improvement in functioning while patient has been engaged in psychotherapy/psychological service- if withdrawn the patient would deteriorate and/or relapse.     MICAELA SLADE, [facility]   April 14, 2023                                                        ______________________________________________________________________    Individual Treatment Plan    Patient's Name: Randi Cleary  YOB: 1955    Date of Creation: 6/30/20  Date Treatment Plan Last Reviewed/Revised: 3/2/23    DSM5 Diagnoses: 296.89 Bipolar II Disorder Depressed, 300.02 (F41.1) Generalized Anxiety Disorder or Adjustment Disorders  309.89  "(F43.8) Other Specified Trauma and Stressor Related Disorder  Psychosocial / Contextual Factors: Patient's entire family of origin has , she now has a sister-in-law and  as support.  Relationship with  is conflictual. She is recovering from surgeries  PROMIS (reviewed every 90 days): PROMIS-10 Scores  PROMIS 10-Global Health (only subscores and total score):   PROMIS-10 Scores Only 2021 3/15/2022 3/15/2022 3/15/2022 3/24/2022 2022 2022   Global Mental Health Score 6 6 6 6 8 12 12   Global Physical Health Score 9 9 9 9 8 11 10   PROMIS TOTAL - SUBSCORES 15 15 15 15 16 23 22       Referral / Collaboration:  Referral to another professional/service is not indicated at this time..    Anticipated number of session for this episode of care: 50  Anticipation frequency of session: Biweekly  Anticipated Duration of each session: 53 or more minutes due to intensity of trauma symptoms  Treatment plan will be reviewed in 90 days or when goals have been changed.   There has been demonstrated improvement in functioning while patient has been engaged in psychotherapy/psychological service- if withdrawn the patient would deteriorate and/or relapse.       MeasurableTreatment Goal(s) related to diagnosis / functional impairment(s)  Goal 1: Patient will \"jumpstart, getting going with the things I need to be doing around the house as far as picking up, doing things, trying to do something every day.  Also to lessen the animosity between me and my .\"    I will know I've met my goal when my shoulders are fixed and I can see.      Objective #A (Patient Action)    Patient will increase frequency of engaging her in ADLs.  Status: Continued - Date(s): 12/10/21, 3/9/22, 22, 22, 22, 3/2/23    Intervention(s)  Therapist will engage patient in CBT, specifically behavioral activation.    Objective #B  Patient will  track and record at least 5 pleasant exchanges with . Patient will be able " "to identify at least 5 positive traits about her  and how he relates to her.  Status: Continued - Date(s): 12/10/21, 3/9/22, 6/9/22, 9/2/22, 12/1/22, 3/2/23    Intervention(s)  Therapist will teach assertiveness skills and assign homework related to relationship interactions.    Objective #C  Patient will reduce level of depressive and anxious features as evidenced by reduction in score on her CHAVO-7 and PHQ-9 (scores of 15 and 16 at first measurment, respectively).  Status: Continued - Date(s): 12/10/21, 3/9/22, 6/9/22, 9/2/22, 12/1/22, 3/2/23    Intervention(s)  Therapist will engage patient in person-centered therapy and CBT.    Patient has reviewed and agreed to the above plan.      MICAELA SLADE, [facility]  March 2, 2023                                                   Randi Cleary          SAFETY PLAN:  Step 1: Warning signs / cues (Thoughts, images, mood, situation, behavior) that a crisis may be developing:  ? Thoughts: \"I don't want to continue\" \"I am unwanted\"  ? Images: none  ? Thinking Processes: ruminating  ? Mood: anger  ? Behaviors: isolating/withdrawing , can't stop crying, not taking care of myself and not taking care of my responsibilities  ? Situations: small triggers, such as not being able to find something, or dropping something   Step 2: Coping strategies - Things I can do to take my mind off of my problems without contacting another person (relaxation technique, physical activity):  ? Distress Tolerance Strategies:  arts and crafts: drawing, play with my pet , listen to positive and upbeat music: any, change body temperature (ice pack/cold water)  and paced breathing/progressive muscle relaxation  ? Physical Activities: go for a walk, deep breathing and stretching   ? Focus on helpful thoughts:  \"You've been through this before, you can get through it again.\"  Step 3: People and social settings that provide " distraction:                 Name: Carmen                            Name: Darien                           Name: Aleida       ? pool, shopping, Carmen's house, [employer]       Step 4: Remind myself of people and things that are important to me and worth living for:  Clifford Little Donna, post-COVID world, options of what could be in your future        Step 5: When I am in crisis, I can ask these people to help me use my safety plan:                 Name: Sidney  Step 6: Making the environment safe:   ? go to sleep/daydream  Step 7: Professionals or agencies I can contact during a crisis:  ? [facility] Daytime Number: [phone]  ? Suicide Prevention Lifeline: 7-135-309-QDEB (7876)  ? Crisis Text Line Service (available 24 hours a day, 7 days a week): Text [place] to 579228    Local Crisis Services: [facility] Crisis: [phone]  Adults can always access to the emPATH unit at [facility] (no phone number, utilize it like an urgent care or ER where you just show up)     Call 911 or go to my nearest emergency department.       I helped develop this safety plan and agree to use it when needed.  I have been given a copy of this plan.       Client signature _________________________________________________________________  Today s date:  11/24/2020  Adapted from Safety Plan Template 2008 Randi Poole and Robby Barba is reprinted with the express permission of the authors.  No portion of the Safety Plan Template may be reproduced without the express, written permission.  You can contact the authors at [email or web address] or [email or web address].

## 2023-04-14 NOTE — GROUP NOTE
Psychotherapy Group Note    PATIENT'S NAME: Randi Cleary  MRN:   [id number]  :   1955  ACCT. NUMBER: [id number]  DATE OF SERVICE: 23  START TIME: 10:00 AM  END TIME: 10:50 AM  FACILITATOR: Brittani Molina LICSW  TOPIC: MH EBP Group: Self-Awareness  Service Modality:  Video Visit     Telemedicine Visit: The patient's condition can be safely assessed and treated via synchronous audio and visual telemedicine encounter.       Reason for Telemedicine Visit: Services only offered telehealth and due to COVID-19.     Originating Site (Patient Location): Patient's home     Distant Site (Provider Location): Provider Remote Setting- Home Office     Consent:  The patient/guardian has verbally consented to: the potential risks and benefits of telemedicine (video visit) versus in person care; bill my insurance or make self-payment for services provided; and responsibility for payment of non-covered services.      Patient would like the video invitation sent by:  My Chart     Mode of Communication:  Video Conference via Medical Zoom     As the provider I attest to compliance with applicable laws and regulations related to telemedicine.     [facility] 55+ Program  TRACK: A1    NUMBER OF PARTICIPANTS: 8    Summary of Group / Topics Discussed:  Self-Awareness: Personal Strengths: Topic focused on assisting patients in identifying personal strengths and how they relate to the management of mental health symptoms. Patients discussed the benefits of acknowledging their personal strengths and their impact on mood improvement, mindfulness, and perspective. Patients worked to increase time spent on recognition and appreciation of what is positive and working in their lives. The goal is to reduce rumination and negative thinking resulting in increased mindfulness and resilience. Patients will work to put skills into practice and problem-solve barriers.     Patient Session Goals / Objectives:    Identified  personal strengths    Identified barriers to recognition of personal strengths    Verbalized understanding of strategies to increase use of their strengths in management of daily symptoms      Patient Participation / Response:  Fully participated with the group by sharing personal reflections / insights and openly received / provided feedback with other participants.    Demonstrated understanding of topics discussed through group discussion and participation, Demonstrated understanding of values, strengths, and challenges to learn about themselves and Practiced skills in session    Treatment Plan:  Patient has a current master individualized treatment plan.  See Epic treatment plan for more information.    Brittani Molina, LICSW

## 2023-04-14 NOTE — GROUP NOTE
Process Group Note    PATIENT'S NAME: Randi Cleary  MRN:   [id number]  :   1955  ACCT. NUMBER: [id number]  DATE OF SERVICE: 23  START TIME:  9:00 AM  END TIME:  9:50 AM  FACILITATOR: Brittani Molina [facility]  TOPIC:  Process Group    Diagnoses:   296.33 (F33.2) Major Depressive Disorder, Recurrent Episode, Severe With anxious distress  300.02 (F41.1) Generalized Anxiety Disorder    Rule out:  296.89 Bipolar II Disorder vs. 314.01 (F90.2) Attention-Deficit/Hyperactivity Disorder   Combined presentation         Service Modality:  Video Visit     Telemedicine Visit: The patient's condition can be safely assessed and treated via synchronous audio and visual telemedicine encounter.       Reason for Telemedicine Visit: Services only offered telehealth and due to COVID-19.     Originating Site (Patient Location): Patient's home     Distant Site (Provider Location): Provider Remote Setting- Home Office     Consent:  The patient/guardian has verbally consented to: the potential risks and benefits of telemedicine (video visit) versus in person care; bill my insurance or make self-payment for services provided; and responsibility for payment of non-covered services.      Patient would like the video invitation sent by:  My Chart     Mode of Communication:  Video Conference via Medical Zoom     As the provider I attest to compliance with applicable laws and regulations related to telemedicine.    [facility] 55+ Program  TRACK: A1    NUMBER OF PARTICIPANTS: 8          Data:    Session content: At the start of this group, patients were invited to check in by identifying themselves, describing their current emotional status, and identifying issues to address in this group.   Area(s) of treatment focus addressed in this session included Symptom Management, Personal Safety, Community Resources/Discharge Planning, Abstinence/Relapse Prevention, Develop / Improve Independent Living Skills, Develop  Socialization / Interpersonal Relationship Skills and Physical Health .    Winnie reports depressed and anxious mood. Denies S/I or safety issues. She reports an increase in pain and is coping with co-morbid physical health issues. 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 She did not complete but will talk with her friend who is assisting in how she should proceed before deadline. She is aware that she can ask for an extension. Winnie was able to pace self, take breaks and go for a walk.      Therapeutic Interventions/Treatment Strategies:  Psychotherapist offered support, feedback and validation and reinforced use of skills. Treatment modalities used include Cognitive Behavioral Therapy.    Assessment:    Patient response:   Patient responded to session by accepting feedback, giving feedback, listening, focusing on goals, being attentive, accepting support and verbalizing understanding    Possible barriers to participation / learning include: and no barriers identified    Health Issues:   Yes: Pain, Associated Psychological Distress  Chronic disease management, Associated Psychological Distress     Medication compliance.       Substance Use Review:   Substance Use: No active concerns identified.    Mental Status/Behavioral Observations  Appearance:   Appropriate   Eye Contact:   Good   Psychomotor Behavior: Normal   Attitude:   Cooperative  Interested Pleasant  Orientation:   All  Speech   Rate / Production: Normal    Volume:  Normal   Mood:    Anxious  Depressed   Affect:    Appropriate  Worrisome   Thought Content:   Clear and Safety denies any current safety concerns including suicidal ideation, self-harm, and homicidal ideation  Thought Form:  Coherent  Goal Directed  Logical     Insight:    Good     Plan:     Safety Plan: No current safety concerns identified.  Recommended that patient call 911 or go to the local ED should there be a change in any of these risk factors.     Barriers to  treatment: None identified    Patient Contracts (see media tab):  None    Substance Use: Not addressed in session     Continue or Discharge: Patient will continue in 55+ Program (55+) as planned. Patient is likely to benefit from learning and using skills as they work toward the goals identified in their treatment plan.      Brittani Molina, [facility]  April 14, 2023

## 2023-04-18 ENCOUNTER — TELEPHONE (OUTPATIENT)
Dept: BEHAVIORAL HEALTH | Facility: CLINIC | Age: 68
End: 2023-04-18
Payer: MEDICARE

## 2023-04-18 ENCOUNTER — HOSPITAL ENCOUNTER (OUTPATIENT)
Dept: BEHAVIORAL HEALTH | Facility: CLINIC | Age: 68
Discharge: HOME | End: 2023-04-18
Attending: PSYCHIATRY & NEUROLOGY
Payer: MEDICARE

## 2023-04-18 PROCEDURE — 90853 GROUP PSYCHOTHERAPY: CPT | Mod: GT

## 2023-04-18 PROCEDURE — 90853 GROUP PSYCHOTHERAPY: CPT | Mod: GT | Performed by: SOCIAL WORKER

## 2023-04-18 NOTE — GROUP NOTE
Psychotherapy Group Note    PATIENT'S NAME: Randi Cleary  MRN:   [id number]  :   1955  ACCT. NUMBER: [id number]  DATE OF SERVICE: 23  START TIME: 11:00 AM  END TIME: 11:50 AM  FACILITATOR: Luh Hare LGSW  TOPIC:  EBP Group: Emotions Management  [facility] 55+ Program  TRACK: A1    NUMBER OF PARTICIPANTS: 5    Summary of Group / Topics Discussed:  Emotions Management: Understanding Emotions: Patients discussed the purpose of emotions and function they serve in our lives.  Reviewed core emotions, why they happen (triggers), and how they occur. The group assisted one anothers' understanding that: emotional experiences are important; difficult emotions have a place in our lives; and the differences between various emotions.    Patient Session Goals / Objectives:    Demonstrate understanding of types various emotions    Identify and discuss specific emotions and when they occur; understand triggers    Discuss barriers to emotional regulation                                      Service Modality:  Video Visit     Telemedicine Visit: The patient's condition can be safely assessed and treated via synchronous audio and visual telemedicine encounter.      Reason for Telemedicine Visit: Services only offered telehealth    Originating Site (Patient Location): Patient's home    Distant Site (Provider Location): [facility]: [facility]    Consent:  The patient/guardian has verbally consented to: the potential risks and benefits of telemedicine (video visit) versus in person care; bill my insurance or make self-payment for services provided; and responsibility for payment of non-covered services.     Patient would like the video invitation sent by:  My Chart    Mode of Communication:  Video Conference via Medical Zoom    As the provider I attest to compliance with applicable laws and regulations related to telemedicine.                               Patient Participation / Response:  Fully  participated with the group by sharing personal reflections / insights and openly received / provided feedback with other participants.    Demonstrated understanding of topics discussed through group discussion and participation, Expressed understanding of the relevance / importance of emotions management skills at distressing times in life, Self-aware of experiences with difficult emotions, and strategies to employ to manage them, Demonstrated knowledge of when to consider applying a variety of emotions management skills in daily life and Identified barriers to applying emotions management strategies    Treatment Plan:  Patient has a current master individualized treatment plan.  See Epic treatment plan for more information.    Luh Hare LGSW

## 2023-04-18 NOTE — GROUP NOTE
Psychotherapy Group Note    PATIENT'S NAME: Randi Cleary  MRN:   [id number]  :   1955  ACCT. NUMBER: [id number]  DATE OF SERVICE: 23  START TIME: 10:00 AM  END TIME: 10:50 AM  FACILITATOR: Brittani Molina LICSW  TOPIC: MH EBP Group: Symptom Awareness  Service Modality:  Video Visit     Telemedicine Visit: The patient's condition can be safely assessed and treated via synchronous audio and visual telemedicine encounter.       Reason for Telemedicine Visit: Services only offered telehealth and due to COVID-19.     Originating Site (Patient Location): Patient's home     Distant Site (Provider Location): Provider Remote Setting- Home Office     Consent:  The patient/guardian has verbally consented to: the potential risks and benefits of telemedicine (video visit) versus in person care; bill my insurance or make self-payment for services provided; and responsibility for payment of non-covered services.      Patient would like the video invitation sent by:  My Chart     Mode of Communication:  Video Conference via Medical Zoom     As the provider I attest to compliance with applicable laws and regulations related to telemedicine.     [facility] 55+ Program  TRACK: A1    NUMBER OF PARTICIPANTS: 6      Summary of Group / Topics Discussed:  Symptom Awareness: Mood Disorders: Patients received a general overview of mood disorders including anxiety disorders and  and how it relates to their current symptoms. The purpose is to promote understanding of their diagnoses and how it impacts their functioning. Patients reviewed their current awareness of symptoms and diagnoses. Patients received information regarding diagnoses, etiology, cultural, and environmental factors as well as impact on functioning.     Patient Session Goals / Objectives:    Discussed patient individual symptoms and experiences    Reviewed diagnostic criteria and etiology of diagnoses     Identified symptoms of anxiety, helpful  coping skills and the helpful roles of support needed      Patient Participation / Response:  Fully participated with the group by sharing personal reflections / insights and openly received / provided feedback with other participants.    Demonstrated understanding of topics discussed through group discussion and participation and Demonstrated understanding of how information regarding symptoms can assist in management of symptoms    Treatment Plan:  Patient has a current master individualized treatment plan.  See Epic treatment plan for more information.    Brittani Molina, LICSW

## 2023-04-18 NOTE — GROUP NOTE
Process Group Note    PATIENT'S NAME: Randi Cleary  MRN:   [id number]  :   1955  ACCT. NUMBER: [id number]  DATE OF SERVICE: 23  START TIME:  9:00 AM  END TIME:  9:50 AM  FACILITATOR: Brittani Molina [facility]  TOPIC:  Process Group    Diagnoses:   296.33 (F33.2) Major Depressive Disorder, Recurrent Episode, Severe With anxious distress  300.02 (F41.1) Generalized Anxiety Disorder    Rule out:  296.89 Bipolar II Disorder vs. 314.01 (F90.2) Attention-Deficit/Hyperactivity Disorder   Combined presentation         Service Modality:  Video Visit     Telemedicine Visit: The patient's condition can be safely assessed and treated via synchronous audio and visual telemedicine encounter.       Reason for Telemedicine Visit: Services only offered telehealth and due to COVID-19.     Originating Site (Patient Location): Patient's home     Distant Site (Provider Location): Provider Remote Setting- Home Office     Consent:  The patient/guardian has verbally consented to: the potential risks and benefits of telemedicine (video visit) versus in person care; bill my insurance or make self-payment for services provided; and responsibility for payment of non-covered services.      Patient would like the video invitation sent by:  My Chart     Mode of Communication:  Video Conference via Medical Zoom     As the provider I attest to compliance with applicable laws and regulations related to telemedicine.    [facility] 55+ Program  TRACK: A1    NUMBER OF PARTICIPANTS: 6          Data:    Session content: At the start of this group, patients were invited to check in by identifying themselves, describing their current emotional status, and identifying issues to address in this group.   Area(s) of treatment focus addressed in this session included Symptom Management, Personal Safety, Community Resources/Discharge Planning, Abstinence/Relapse Prevention, Develop / Improve Independent Living Skills, Develop  Socialization / Interpersonal Relationship Skills and Physical Health .    Winnie reports anxious and less depressed mood. Denies S/I or safety issues. Winnie reported on her weekend and how she completed task management with her taxes. She felt overwhelmed at times with anxiety. She also coped with an increase in co-morbid physical health symptoms including pain. Writer spoke with patient who requested to extend her admission for one week for symptom management, support and aftercare planning support. Discharge now 5/5/2023.       Therapeutic Interventions/Treatment Strategies:  Psychotherapist offered support, feedback and validation and reinforced use of skills. Treatment modalities used include Cognitive Behavioral Therapy.    Assessment:    Patient response:   Patient responded to session by accepting feedback, giving feedback, listening, focusing on goals, being attentive, accepting support and verbalizing understanding    Possible barriers to participation / learning include: and no barriers identified    Health Issues:   Yes: Pain, Associated Psychological Distress  Chronic disease management, Associated Psychological Distress     Medication compliance.       Substance Use Review:   Substance Use: No active concerns identified.    Mental Status/Behavioral Observations  Appearance:   Appropriate   Eye Contact:   Good   Psychomotor Behavior: Normal   Attitude:   Cooperative  Interested Pleasant  Orientation:   All  Speech   Rate / Production: Normal    Volume:  Normal   Mood:    Anxious  Less depressed mood  Affect:    Appropriate   Thought Content:   Clear and Safety denies any current safety concerns including suicidal ideation, self-harm, and homicidal ideation  Thought Form:  Coherent  Goal Directed  Logical     Insight:    Good     Plan:     Safety Plan: No current safety concerns identified.  Recommended that patient call 911 or go to the local ED should there be a change in any of these risk factors.      Barriers to treatment: None identified    Patient Contracts (see media tab):  None    Substance Use: Not addressed in session     Continue or Discharge: Patient will continue in 55+ Program (55+) as planned. Patient is likely to benefit from learning and using skills as they work toward the goals identified in their treatment plan.      Brittani Molina, [facility]  April 18, 2023

## 2023-04-19 ENCOUNTER — HOSPITAL ENCOUNTER (OUTPATIENT)
Dept: BEHAVIORAL HEALTH | Facility: CLINIC | Age: 68
Discharge: HOME | End: 2023-04-19
Attending: PSYCHIATRY & NEUROLOGY
Payer: MEDICARE

## 2023-04-19 PROCEDURE — 90853 GROUP PSYCHOTHERAPY: CPT | Mod: PO

## 2023-04-19 PROCEDURE — 90853 GROUP PSYCHOTHERAPY: CPT | Mod: GT | Performed by: SOCIAL WORKER

## 2023-04-19 NOTE — GROUP NOTE
Process Group Note    PATIENT'S NAME: Randi Cleary  MRN:   [id number]  :   1955  ACCT. NUMBER: [id number]  DATE OF SERVICE: 23  START TIME:  9:00 AM  END TIME:  9:50 AM  FACILITATOR: Brittani Molina [facility]  TOPIC:  Process Group    Diagnoses:   296.33 (F33.2) Major Depressive Disorder, Recurrent Episode, Severe With anxious distress  300.02 (F41.1) Generalized Anxiety Disorder    Rule out:  296.89 Bipolar II Disorder vs. 314.01 (F90.2) Attention-Deficit/Hyperactivity Disorder   Combined presentation         Service Modality:  Video Visit     Telemedicine Visit: The patient's condition can be safely assessed and treated via synchronous audio and visual telemedicine encounter.       Reason for Telemedicine Visit: Services only offered telehealth and due to COVID-19.     Originating Site (Patient Location): Patient's home     Distant Site (Provider Location): Provider Remote Setting- Home Office     Consent:  The patient/guardian has verbally consented to: the potential risks and benefits of telemedicine (video visit) versus in person care; bill my insurance or make self-payment for services provided; and responsibility for payment of non-covered services.      Patient would like the video invitation sent by:  My Chart     Mode of Communication:  Video Conference via Medical Zoom     As the provider I attest to compliance with applicable laws and regulations related to telemedicine.    [facility] 55+ Program  TRACK: A1    NUMBER OF PARTICIPANTS: 6          Data:    Session content: At the start of this group, patients were invited to check in by identifying themselves, describing their current emotional status, and identifying issues to address in this group.   Area(s) of treatment focus addressed in this session included Symptom Management, Personal Safety, Community Resources/Discharge Planning, Abstinence/Relapse Prevention, Develop / Improve Independent Living Skills, Develop  Socialization / Interpersonal Relationship Skills and Physical Health .    Winnie reports less depressed and less anxious  Mood. Denies S/I or safety issues. She processed co-morbid physical health issues with psoriatic arthritis and elevated pain. She reported on her feelings with changing health providers. She can no longer consult with her Rheumotolgist and the [facility] declined to consult with her. She was encouraged to contact her health insurance for providers in network also for case management. She has some concerns about starting Trileptol and was encouraged to contact her outpatient psychiatrist office. iWnnie welcomed feedback from peers on group who encouraged for her to write questions down.      Therapeutic Interventions/Treatment Strategies:  Psychotherapist offered support, feedback and validation and reinforced use of skills. Treatment modalities used include Cognitive Behavioral Therapy.    Assessment:    Patient response:   Patient responded to session by accepting feedback, giving feedback, listening, focusing on goals, being attentive, accepting support and verbalizing understanding    Possible barriers to participation / learning include: and no barriers identified    Health Issues:   Yes: Pain, Associated Psychological Distress  Chronic disease management, Associated Psychological Distress     Medication compliance. Uses medical marijauna. She would like to consult with the Pain Clinic.       Substance Use Review:   Substance Use: No active concerns identified.    Mental Status/Behavioral Observations  Appearance:   Appropriate   Eye Contact:   Good   Psychomotor Behavior: Normal   Attitude:   Cooperative  Interested  Orientation:   All  Speech   Rate / Production: Normal    Volume:  Normal   Mood:    Less depressed and less anxious mood  Affect:    Appropriate  Tearful Worrisome   Thought Content:   Clear and Safety denies any current safety concerns including suicidal ideation, self-harm, and  homicidal ideation  Thought Form:  Coherent  Goal Directed  Logical     Insight:    Good     Plan:     Safety Plan: No current safety concerns identified.  Recommended that patient call 911 or go to the local ED should there be a change in any of these risk factors.     Barriers to treatment: None identified    Patient Contracts (see media tab):  None    Substance Use: Not addressed in session     Continue or Discharge: Patient will continue in 55+ Program (55+) as planned. Patient is likely to benefit from learning and using skills as they work toward the goals identified in their treatment plan.      Brittani Molina, [facility]  April 19, 2023     Alert and oriented, no focal deficits, no motor or sensory deficits.

## 2023-04-19 NOTE — GROUP NOTE
Psychotherapy Group Note    PATIENT'S NAME: Randi Cleary  MRN:   [id number]  :   1955  ACCT. NUMBER: [id number]  DATE OF SERVICE: 23  START TIME: 10:00 AM  END TIME: 10:50 AM  FACILITATOR: Brittani Molina LICSW  TOPIC: MH EBP Group: Self-Awareness  Service Modality:  Video Visit     Telemedicine Visit: The patient's condition can be safely assessed and treated via synchronous audio and visual telemedicine encounter.       Reason for Telemedicine Visit: Services only offered telehealth and due to COVID-19.     Originating Site (Patient Location): Patient's home     Distant Site (Provider Location): Provider Remote Setting- Home Office     Consent:  The patient/guardian has verbally consented to: the potential risks and benefits of telemedicine (video visit) versus in person care; bill my insurance or make self-payment for services provided; and responsibility for payment of non-covered services.      Patient would like the video invitation sent by:  My Chart     Mode of Communication:  Video Conference via Medical Zoom     As the provider I attest to compliance with applicable laws and regulations related to telemedicine.     [facility] 55+ Program  TRACK: A1    NUMBER OF PARTICIPANTS: 5    Summary of Group / Topics Discussed:  Self-Awareness: Self-Compassion: Patients received overview of key concepts in developing self-compassion. Patients discussed mindfulness, self-kindness, and finding common humanity. Patients identified their current approach to problems in their lives and learned skills for increasing self-compassion. Patients identified ways they can put self-compassion skills into practice and problem solve barriers to application of skills.     Patient Session Goals / Objectives:    [place] components of self-compassion    Identify ways to practice self-compassion in daily life    Problem solve barriers to self-compassion practice      Patient Participation / Response:  Fully  participated with the group by sharing personal reflections / insights and openly received / provided feedback with other participants.    Demonstrated understanding of topics discussed through group discussion and participation, Demonstrated understanding of values, strengths, and challenges to learn about themselves and Practiced skills in session    Treatment Plan:  Patient has a current master individualized treatment plan.  See Epic treatment plan for more information.    Brittani Molina, LICSW

## 2023-04-19 NOTE — GROUP NOTE
Psychotherapy Group Note    PATIENT'S NAME: Randi Cleary  MRN:   [id number]  :   1955  ACCT. NUMBER: [id number]  DATE OF SERVICE: 23  START TIME: 11:00 AM  END TIME: 11:50 AM  FACILITATOR: Luh Hare LGSW  TOPIC:  EBP Group: Emotions Management  [facility] 55+ Program  TRACK: A1    NUMBER OF PARTICIPANTS: 6    Summary of Group / Topics Discussed:  Emotions Management: Anger: Patients explored and shared personal experiences associated with feelings of anger.  Group explored how these feelings develop, what they mean to each individual, and how to increase acceptance and usefulness of these feelings.  Discussed anger as a  secondary  emotion and reviewed ways to manage anger and challenge associated cognitive distortions. Group members worked to contextualize these concepts and promote healing.     Patient Session Goals / Objectives:    Discuss and review definitions and personal views/experiences with anger    Explore how feelings of anger impact functioning    Understand and practice strategies to manage difficult emotions and move towards healing    Demonstrate understanding of the feelings of anger    Verbalize how these emotions have impacted their lives/functioning    Verbalize of knowledge gained and possible interventions to manage feelings                                      Service Modality:  Video Visit     Telemedicine Visit: The patient's condition can be safely assessed and treated via synchronous audio and visual telemedicine encounter.      Reason for Telemedicine Visit: Services only offered telehealth    Originating Site (Patient Location): Patient's home    Distant Site (Provider Location): Provider Remote Setting- Home Office    Consent:  The patient/guardian has verbally consented to: the potential risks and benefits of telemedicine (video visit) versus in person care; bill my insurance or make self-payment for services provided; and responsibility for payment of  non-covered services.     Patient would like the video invitation sent by:  My Chart    Mode of Communication:  Video Conference via Medical Zoom    As the provider I attest to compliance with applicable laws and regulations related to telemedicine.                               Patient Participation / Response:  Fully participated with the group by sharing personal reflections / insights and openly received / provided feedback with other participants.    Demonstrated understanding of topics discussed through group discussion and participation, Self-aware of experiences with difficult emotions, and strategies to employ to manage them, Identified barriers to applying emotions management strategies and Identified strategies to overcome barriers to use of emotions management skills    Treatment Plan:  Patient has a current master individualized treatment plan.  See Epic treatment plan for more information.    Luh Hare LGSW

## 2023-04-21 ENCOUNTER — HOSPITAL ENCOUNTER (OUTPATIENT)
Dept: BEHAVIORAL HEALTH | Facility: CLINIC | Age: 68
Discharge: HOME | End: 2023-04-21
Attending: PSYCHIATRY & NEUROLOGY
Payer: MEDICARE

## 2023-04-21 PROCEDURE — 90853 GROUP PSYCHOTHERAPY: CPT | Mod: GT

## 2023-04-21 NOTE — GROUP NOTE
Process Group Note    PATIENT'S NAME: Randi Cleary  MRN:   [id number]  :   1955  ACCT. NUMBER: [id number]  DATE OF SERVICE: 23  START TIME:  9:00 AM  END TIME:  9:50 AM  FACILITATOR: Patsy Mac LGSW  TOPIC:  Process Group    Diagnoses:  Principal DSM5 Diagnoses  (Sustained by DSM5 Criteria Listed Above):   296.33 (F33.2) Major Depressive Disorder, Recurrent Episode, Severe With anxious distress  300.02 (F41.1) Generalized Anxiety Disorder    Rule out:  296.89 Bipolar II Disorder vs. 314.01 (F90.2) Attention-Deficit/Hyperactivity Disorder   Combined presentation     [facility] Day [facility]  TRACK: A1    NUMBER OF PARTICIPANTS: 6    Service Modality:  Video Visit     Telemedicine Visit: The patient's condition can be safely assessed and treated via synchronous audio and visual telemedicine encounter.      Reason for Telemedicine Visit: Services only offered telehealth    Originating Site (Patient Location): Patient's home    Distant Site (Provider Location): Provider Remote Setting- Home Office    Consent:  The patient/guardian has verbally consented to: the potential risks and benefits of telemedicine (video visit) versus in person care; bill my insurance or make self-payment for services provided; and responsibility for payment of non-covered services.     Patient would like the video invitation sent by:  My Chart    Mode of Communication:  Video Conference via Medical Zoom    As the provider I attest to compliance with applicable laws and regulations related to telemedicine.             Data:    Session content: At the start of this group, patients were invited to check in by identifying themselves, describing their current emotional status, and identifying issues to address in this group.   Area(s) of treatment focus addressed in this session included Symptom Management, Personal Safety and Community Resources/Discharge Planning.  Client reported mood is tired  and sad.   Client denied safety concerns, including SI and SIB. Client denies any chemical use.  Client is taking medications as prescribed. Client stated unable to sleep much last night, puttering around. Having urges to drink. Have been working on taxes and hands and shoulders hurt as a result. Seeing neurologist next week. Client's goal is finish taxes. Client will apply the following skills: self-compassion.  Client is proud of getting to group after little sleep.  Client is grateful for support. Peers offered supportive feedback and validation.      Therapeutic Interventions/Treatment Strategies:  Psychotherapist offered support, feedback and validation. Treatment modalities used include Cognitive Behavioral Therapy. Interventions include Mindfulness: Encouraged a plan to use mindfulness skills in daily life.    Assessment:    Patient response:   Patient responded to session by accepting feedback, giving feedback, listening and focusing on goals    Possible barriers to participation / learning include: and no barriers identified    Health Issues:   None reported       Substance Use Review:   Substance Use: No active concerns identified.    Mental Status/Behavioral Observations  Appearance:   Appropriate   Eye Contact:   Good   Psychomotor Behavior: Normal   Attitude:   Cooperative  Interested Friendly Pleasant  Orientation:   All  Speech   Rate / Production: Normal/ Responsive   Volume:  Normal   Mood:    Depressed  Sad   Affect:    Appropriate  Tearful  Thought Content:   Clear and Safety denies any current safety concerns including suicidal ideation, self-harm, and homicidal ideation  Thought Form:  Coherent  Logical     Insight:    Good     Plan:     Safety Plan: No current safety concerns identified.  Recommended that patient call 911 or go to the local ED should there be a change in any of these risk factors.     Barriers to treatment: None identified    Patient Contracts (see media tab):   None    Substance Use: Not addressed in session     Continue or Discharge: Patient will continue in 55+ Program (55+) as planned. Patient is likely to benefit from learning and using skills as they work toward the goals identified in their treatment plan.      ROSY Juan  April 21, 2023

## 2023-04-21 NOTE — GROUP NOTE
Psychotherapy Group Note    PATIENT'S NAME: Randi Cleary  MRN:   [id number]  :   1955  ACCT. NUMBER: [id number]  DATE OF SERVICE: 23  START TIME: 11:00 AM  END TIME: 11:50 AM  FACILITATOR: Luh Hare LGSW  TOPIC: MH EBP Group: Coping Skills  [facility] 55+ Program  TRACK: A1    NUMBER OF PARTICIPANTS: 6    Summary of Group / Topics Discussed:  Coping Skills: Discharge Planning: Patients discussed and increased understanding of how anticipating and planning for possible increased symptoms is a proactive way to reduce the likelihood of relapse.  Patients shared individualized factors that may lead to increased symptoms and decompensation in functioning.  Each patient developed a relapse prevention plan designed to help them recognize when they may have increasing symptoms requiring them to take action and notify their key supports and care team.      Patient Session Goals / Objectives:    Identify and understand what factors may contribute to symptom relapse.    Identify actions that may be taken to manage increased symptoms/stressors.    Create an individualized written relapse plan    Problem solve barriers to plan creation and implementation    Share relapse plan with key support people                                      Service Modality:  Video Visit     Telemedicine Visit: The patient's condition can be safely assessed and treated via synchronous audio and visual telemedicine encounter.      Reason for Telemedicine Visit: Services only offered telehealth    Originating Site (Patient Location): Patient's home    Distant Site (Provider Location): Provider Remote Setting- Home Office    Consent:  The patient/guardian has verbally consented to: the potential risks and benefits of telemedicine (video visit) versus in person care; bill my insurance or make self-payment for services provided; and responsibility for payment of non-covered services.     Patient would like the video invitation  sent by:  My Chart    Mode of Communication:  Video Conference via Medical Zoom    As the provider I attest to compliance with applicable laws and regulations related to telemedicine.                                 Patient Participation / Response:  Fully participated with the group by sharing personal reflections / insights and openly received / provided feedback with other participants.    Demonstrated understanding of topics discussed through group discussion and participation and Expressed understanding of the relevance / importance of coping skills at distressing times in life    Treatment Plan:  Patient has a current master individualized treatment plan.  See Epic treatment plan for more information.    Luh Hare LGSW

## 2023-04-21 NOTE — GROUP NOTE
Psychotherapy Group Note    PATIENT'S NAME: Randi Cleary  MRN:   [id number]  :   1955  ACCT. NUMBER: [id number]  DATE OF SERVICE: 23  START TIME: 11:00 AM  END TIME: 11:50 AM  FACILITATOR: Luh Hare LGSW  TOPIC: MH EBP Group: Mindfulness  [facility] 55+ Program  TRACK: A1    NUMBER OF PARTICIPANTS: 6    Summary of Group / Topics Discussed:  Mindfulness: Mindfulness Experiential: Patients received an overview on what mindfulness is and how mindfulness can benefit general health, mental health symptoms, and stressors. The history of mindfulness, its application to mental health therapies, and key concepts were also discussed. Patients discussed current awareness, knowledge, and practice of mindfulness skills. Patients also discussed barriers to mindfulness practice.    Patient Session Goals / Objectives:  Demonstrated and verbalized understanding of key mindfulness concepts  Identified when/how to use mindfulness skills  Resolved barriers to practicing mindfulness skills  Identified plan to use mindfulness skills in daily life                                       Service Modality:  Video Visit     Telemedicine Visit: The patient's condition can be safely assessed and treated via synchronous audio and visual telemedicine encounter.      Reason for Telemedicine Visit: Services only offered telehealth    Originating Site (Patient Location): Patient's home    Distant Site (Provider Location): Provider Remote Setting- Home Office    Consent:  The patient/guardian has verbally consented to: the potential risks and benefits of telemedicine (video visit) versus in person care; bill my insurance or make self-payment for services provided; and responsibility for payment of non-covered services.     Patient would like the video invitation sent by:  My Chart    Mode of Communication:  Video Conference via Medical Zoom    As the provider I attest to compliance with applicable laws and regulations  related to telemedicine.                               Patient Participation / Response:  {OP MH PROGRESS NOTE PATIENT PARTICIPATION:776637}    {OP MH PROGRESS NOTE PATIENT RESPONSE - MINDFULNESS:122226}    Treatment Plan:  Patient has {OP  PROGRAMMATIC TX PLAN STATUS:763279}    ROSY Garcia    yes

## 2023-04-21 NOTE — GROUP NOTE
Psychotherapy Group Note    PATIENT'S NAME: Randi Cleary  MRN:   [id number]  :   1955  ACCT. NUMBER: [id number]  DATE OF SERVICE: 23  START TIME: 10:00 AM  END TIME: 10:50 AM  FACILITATOR: Patsy Mac LGSW  TOPIC: MH EBP Group: Emotions Management  [facility] 55+ Program  TRACK: A1    NUMBER OF PARTICIPANTS: 6    Summary of Group / Topics Discussed:  Weekend Planning    Patient Session Goals / Objectives:    Learn the process of creating balance in weekend plans that support holistic health.    Create plans for self-care      Patient Participation / Response:  Fully participated with the group by sharing personal reflections / insights and openly received / provided feedback with other participants.    Demonstrated understanding of topics discussed through group discussion and participation, Expressed understanding of the relevance / importance of emotions management skills at distressing times in life and Self-aware of experiences with difficult emotions, and strategies to employ to manage them    Treatment Plan:  Patient has a current master individualized treatment plan.  See Epic treatment plan for more information.    ROSY Juan

## 2023-04-24 ENCOUNTER — TELEPHONE (OUTPATIENT)
Dept: BEHAVIORAL HEALTH | Facility: CLINIC | Age: 68
End: 2023-04-24

## 2023-04-24 NOTE — TELEPHONE ENCOUNTER
----- Message from Chilo Conley sent at 4/24/2023  9:59 AM CDT -----  Regarding: Additional Appt Request  Hi  Team,     Please make more appts.     Location of programming: [facility]/Telemedicine   Group: 55+ A-1 (Tu/W/F) @ 9am   Provider: Roland Das   Number of visits to be scheduled: until May 5  Length/Duration of Appointment in minutes: 180 minutes   Visit Type (VIDEO/TELEPHONE/IN-PERSON): ZOOM GROUP THERAPY VIDEO VISIT [3840]   Additional notes: Thank you!

## 2023-04-25 ENCOUNTER — HOSPITAL ENCOUNTER (OUTPATIENT)
Dept: BEHAVIORAL HEALTH | Facility: CLINIC | Age: 68
Discharge: HOME | End: 2023-04-25
Attending: PSYCHIATRY & NEUROLOGY
Payer: MEDICARE

## 2023-04-25 ENCOUNTER — VIRTUAL VISIT (OUTPATIENT)
Dept: PSYCHOLOGY | Facility: CLINIC | Age: 68
End: 2023-04-25
Payer: MEDICARE

## 2023-04-25 DIAGNOSIS — F31.81 BIPOLAR 2 DISORDER (H): Primary | ICD-10-CM

## 2023-04-25 DIAGNOSIS — F43.9 TRAUMA AND STRESSOR-RELATED DISORDER: ICD-10-CM

## 2023-04-25 DIAGNOSIS — F41.1 GENERALIZED ANXIETY DISORDER: ICD-10-CM

## 2023-04-25 PROCEDURE — 90853 GROUP PSYCHOTHERAPY: CPT | Mod: GT | Performed by: SOCIAL WORKER

## 2023-04-25 PROCEDURE — 90853 GROUP PSYCHOTHERAPY: CPT | Mod: GT

## 2023-04-25 PROCEDURE — 90834 PSYTX W PT 45 MINUTES: CPT | Mod: VID | Performed by: SOCIAL WORKER

## 2023-04-25 ASSESSMENT — ANXIETY QUESTIONNAIRES
3. WORRYING TOO MUCH ABOUT DIFFERENT THINGS: SEVERAL DAYS
8. IF YOU CHECKED OFF ANY PROBLEMS, HOW DIFFICULT HAVE THESE MADE IT FOR YOU TO DO YOUR WORK, TAKE CARE OF THINGS AT HOME, OR GET ALONG WITH OTHER PEOPLE?: VERY DIFFICULT
6. BECOMING EASILY ANNOYED OR IRRITABLE: MORE THAN HALF THE DAYS
2. NOT BEING ABLE TO STOP OR CONTROL WORRYING: SEVERAL DAYS
GAD7 TOTAL SCORE: 10
1. FEELING NERVOUS, ANXIOUS, OR ON EDGE: MORE THAN HALF THE DAYS
GAD7 TOTAL SCORE: 10
1. FEELING NERVOUS, ANXIOUS, OR ON EDGE: MORE THAN HALF THE DAYS
GAD7 TOTAL SCORE: 10
5. BEING SO RESTLESS THAT IT IS HARD TO SIT STILL: SEVERAL DAYS
4. TROUBLE RELAXING: MORE THAN HALF THE DAYS
6. BECOMING EASILY ANNOYED OR IRRITABLE: MORE THAN HALF THE DAYS
7. FEELING AFRAID AS IF SOMETHING AWFUL MIGHT HAPPEN: SEVERAL DAYS
4. TROUBLE RELAXING: MORE THAN HALF THE DAYS
GAD7 TOTAL SCORE: 10
7. FEELING AFRAID AS IF SOMETHING AWFUL MIGHT HAPPEN: SEVERAL DAYS
3. WORRYING TOO MUCH ABOUT DIFFERENT THINGS: SEVERAL DAYS
IF YOU CHECKED OFF ANY PROBLEMS ON THIS QUESTIONNAIRE, HOW DIFFICULT HAVE THESE PROBLEMS MADE IT FOR YOU TO DO YOUR WORK, TAKE CARE OF THINGS AT HOME, OR GET ALONG WITH OTHER PEOPLE: VERY DIFFICULT
IF YOU CHECKED OFF ANY PROBLEMS ON THIS QUESTIONNAIRE, HOW DIFFICULT HAVE THESE PROBLEMS MADE IT FOR YOU TO DO YOUR WORK, TAKE CARE OF THINGS AT HOME, OR GET ALONG WITH OTHER PEOPLE: VERY DIFFICULT
5. BEING SO RESTLESS THAT IT IS HARD TO SIT STILL: SEVERAL DAYS
7. FEELING AFRAID AS IF SOMETHING AWFUL MIGHT HAPPEN: SEVERAL DAYS
GAD7 TOTAL SCORE: 10
8. IF YOU CHECKED OFF ANY PROBLEMS, HOW DIFFICULT HAVE THESE MADE IT FOR YOU TO DO YOUR WORK, TAKE CARE OF THINGS AT HOME, OR GET ALONG WITH OTHER PEOPLE?: VERY DIFFICULT
7. FEELING AFRAID AS IF SOMETHING AWFUL MIGHT HAPPEN: SEVERAL DAYS
2. NOT BEING ABLE TO STOP OR CONTROL WORRYING: SEVERAL DAYS

## 2023-04-25 NOTE — PROGRESS NOTES
[facility]                                     Progress Note    Patient Name: Randi Cleary  Date: 4/25/23         Service Type: Individual     Session Start Time: 12:06 PM  Session End Time: 12:55 PM     Session Length: 49 minutes    Session #: 173    Attendees: Client attended alone    Service Modality:  Video Visit:      Provider verified identity through the following two step process.  Patient provided:  Patient is known previously to provider    Telemedicine Visit: The patient's condition can be safely assessed and treated via synchronous audio and visual telemedicine encounter.      Reason for Telemedicine Visit: Patient convenience (e.g. access to timely appointments / distance to available provider)    Originating Site (Patient Location): Patient's home    Distant Site (Provider Location): Provider Remote Setting- Home Office    Consent:  The patient/guardian has verbally consented to: the potential risks and benefits of telemedicine (video visit) versus in person care; bill my insurance or make self-payment for services provided; and responsibility for payment of non-covered services.     Patient would like the video invitation sent by:  My Chart    Mode of Communication:  Video Conference via [facility]    Distant Location (Provider):  Off-site    As the provider I attest to compliance with applicable laws and regulations related to telemedicine.      DATA  Extended Session (53+ minutes): PROLONGED SERVICE IN THE OUTPATIENT SETTING REQUIRING DIRECT (FACE-TO-FACE) PATIENT CONTACT BEYOND THE USUAL SERVICE:    - Patient's presenting concerns require more intensive intervention than could be completed within the usual service  Interactive Complexity: No  Crisis: No        Progress Since Last Session (Related to Symptoms / Goals / Homework):   Symptoms: Ongoing high anxiety and distress    Homework: Progress made  Decide next plan of action for your health  Keep getting out in the  community    In the future:  Look at a budget  Get back to the pool  Talk to your primary care doctor about what's on your list (mood, neck, arthritis)         Episode of Care Goals: Satisfactory progress - ACTION (Actively working towards change); Intervened by reinforcing change plan / affirming steps taken     Current / Ongoing Stressors and Concerns:   Patient is currently socially isolated. She has a conflictual relationship with her .  She is getting minimal physical activity.  She has had several surgeries.      Treatment Objective(s) Addressed in This Session:   Patient will increase frequency of engaging her in ADLs.  Patient will track and record at least 5 pleasant exchanges with . Patient will be able to identify at least 5 positive traits about her .  Patient will reduce level of depressive and anxious features as evidenced by reduction in score on her CHAVO-7 and PHQ-9 (scores of 15 and 16 at first measurment, respectively).     Intervention:   Supportive Therapy: Processed changes to group therapy. Talked about impact of finances on her house.  Looked at ways she is going to be caring for herself in the future, including her upcoming medical appointments.  Talked about how to to make sure she is able to get all of her points across, including potentially summarizing all information on paper for herself.  Also looked at some ongoing frustrations within her relationship and how she is going to be managing this.  Discussed her desire to clean up her living situation and she thinks this may improve her mood, as well as getting back to the pool as she also thinks this may positively impact her mood.      The following assessments were completed by patient for this visit:  PHQ9:       2/5/2023     9:43 PM 2/14/2023     2:22 PM 2/27/2023    10:44 AM 3/13/2023     8:02 AM 3/20/2023    10:35 AM 4/7/2023    12:25 PM 4/25/2023    12:02 PM   PHQ-9 SCORE   PHQ-9 Total Score MyChart 18  (Moderately severe depression) 18 (Moderately severe depression) 20 (Severe depression) 16 (Moderately severe depression) 15 (Moderately severe depression) 14 (Moderate depression) 11 (Moderate depression)   PHQ-9 Total Score 18 18 20 16 15 14 11     GAD7:       1/30/2023     1:49 PM 2/5/2023     9:49 PM 2/20/2023    10:48 AM 3/9/2023     9:27 AM 3/24/2023     9:06 AM 4/7/2023    12:26 PM 4/25/2023    12:02 PM   CHAVO-7 SCORE   Total Score 17 (severe anxiety) 16 (severe anxiety) 17 (severe anxiety) 15 (severe anxiety) 15 (severe anxiety) 10 (moderate anxiety) 10 (moderate anxiety)   Total Score 17 16 17    17    17    17    17 15    15    15 15    15    15    15 10 10    10     PROMIS 10-Global Health (only subscores and total score):       12/27/2022     1:17 AM 1/5/2023    11:28 AM 2/5/2023     9:48 PM 3/9/2023     9:30 AM 4/7/2023    12:28 PM 4/19/2023     8:26 AM 4/25/2023    12:04 PM   PROMIS-10 Scores Only   Global Mental Health Score 6    6    6 5    5        5 5    5    5    5    5 6    6    6 7    7    7    7 8    8    8 7    7   Global Physical Health Score 8    8    8 8    8        8 8    8    8    8    8 6    6    6 8    8    8    8 7    7    7 10    10   PROMIS TOTAL - SUBSCORES 14    14    14 13    13        13 13    13    13    13    13 12    12    12 15    15    15    15 15    15    15 17    17       Information is confidential and restricted. Go to Review Flowsheets to unlock data.    Multiple values from one day are sorted in reverse-chronological order        ASSESSMENT: Current Emotional / Mental Status (status of significant symptoms):   Risk status (Self / Other harm or suicidal ideation)   Patient denies current fears or concerns for personal safety.   Patient denies current or recent suicidal ideation or behaviors.   Patient denies current or recent homicidal ideation or behaviors.   Patient denies current or recent self injurious behavior or ideation.   Patient denies other safety  concerns.   Patient reports there has been no change in risk factors since their last session.     Patient reports there has been no change in protective factors since their last session.     A safety and risk management plan has been developed including: Patient consented to co-developed safety plan on 11/24/2020, updated 2/20/23.  Safety and risk management plan was reviewed.   Patient agreed to use safety plan should any safety concerns arise.  A copy was made available to the patient.     Appearance:   Appropriate    Eye Contact:   Good    Psychomotor Behavior: Normal    Attitude:   Cooperative    Orientation:   All   Speech    Rate / Production: Normal/ Responsive    Volume:  Normal    Mood:    Normal   Affect:    Appropriate    Thought Content:  Clear    Thought Form:  Coherent    Insight:    Good      Medication Review:   No changes to current psychiatric medication(s)     Medication Compliance:   Yes     Changes in Health Issues:   None reported     Chemical Use Review:   Substance Use: Chemical use reviewed, no active concerns identified Nothing used since August 2021.     Tobacco Use: No current tobacco use.      Diagnosis:  1. Bipolar 2 disorder (H)    2. Generalized anxiety disorder    3. Trauma and stressor-related disorder      Collateral Reports Completed:   Not Applicable    PLAN: (Patient Tasks / Therapist Tasks / Other)  Work on environment  Get letter together for neurology appointment  Get back to the pool    In the future:  Look at a budget  Get back to the pool  Talk to your primary care doctor about what's on your list (mood, neck, arthritis)        There has been demonstrated improvement in functioning while patient has been engaged in psychotherapy/psychological service- if withdrawn the patient would deteriorate and/or relapse.     MIACELA SLADE, [facility]   April 25, 2023                                                     "    ______________________________________________________________________    Individual Treatment Plan    Patient's Name: Randi Cleary  YOB: 1955    Date of Creation: 20  Date Treatment Plan Last Reviewed/Revised: 3/2/23    DSM5 Diagnoses: 296.89 Bipolar II Disorder Depressed, 300.02 (F41.1) Generalized Anxiety Disorder or Adjustment Disorders  309.89 (F43.8) Other Specified Trauma and Stressor Related Disorder  Psychosocial / Contextual Factors: Patient's entire family of origin has , she now has a sister-in-law and  as support.  Relationship with  is conflictual. She is recovering from surgeries  PROMIS (reviewed every 90 days): PROMIS-10 Scores  PROMIS 10-Global Health (only subscores and total score):   PROMIS-10 Scores Only 2021 3/15/2022 3/15/2022 3/15/2022 3/24/2022 2022 2022   Global Mental Health Score 6 6 6 6 8 12 12   Global Physical Health Score 9 9 9 9 8 11 10   PROMIS TOTAL - SUBSCORES 15 15 15 15 16 23 22       Referral / Collaboration:  Referral to another professional/service is not indicated at this time..    Anticipated number of session for this episode of care: 50  Anticipation frequency of session: Biweekly  Anticipated Duration of each session: 53 or more minutes due to intensity of trauma symptoms  Treatment plan will be reviewed in 90 days or when goals have been changed.   There has been demonstrated improvement in functioning while patient has been engaged in psychotherapy/psychological service- if withdrawn the patient would deteriorate and/or relapse.       MeasurableTreatment Goal(s) related to diagnosis / functional impairment(s)  Goal 1: Patient will \"jumpstart, getting going with the things I need to be doing around the house as far as picking up, doing things, trying to do something every day.  Also to lessen the animosity between me and my .\"    I will know I've met my goal when my shoulders are fixed and I can see.  " "    Objective #A (Patient Action)    Patient will increase frequency of engaging her in ADLs.  Status: Continued - Date(s): 12/10/21, 3/9/22, 6/9/22, 9/2/22, 12/1/22, 3/2/23    Intervention(s)  Therapist will engage patient in CBT, specifically behavioral activation.    Objective #B  Patient will  track and record at least 5 pleasant exchanges with . Patient will be able to identify at least 5 positive traits about her  and how he relates to her.  Status: Continued - Date(s): 12/10/21, 3/9/22, 6/9/22, 9/2/22, 12/1/22, 3/2/23    Intervention(s)  Therapist will teach assertiveness skills and assign homework related to relationship interactions.    Objective #C  Patient will reduce level of depressive and anxious features as evidenced by reduction in score on her CHAVO-7 and PHQ-9 (scores of 15 and 16 at first measurment, respectively).  Status: Continued - Date(s): 12/10/21, 3/9/22, 6/9/22, 9/2/22, 12/1/22, 3/2/23    Intervention(s)  Therapist will engage patient in person-centered therapy and CBT.    Patient has reviewed and agreed to the above plan.      MICAELA SLADE, [facility]  March 2, 2023                                                   Randi Cleary          SAFETY PLAN:  Step 1: Warning signs / cues (Thoughts, images, mood, situation, behavior) that a crisis may be developing:  ? Thoughts: \"I don't want to continue\" \"I am unwanted\"  ? Images: none  ? Thinking Processes: ruminating  ? Mood: anger  ? Behaviors: isolating/withdrawing , can't stop crying, not taking care of myself and not taking care of my responsibilities  ? Situations: small triggers, such as not being able to find something, or dropping something   Step 2: Coping strategies - Things I can do to take my mind off of my problems without contacting another person (relaxation technique, physical activity):  ? Distress Tolerance Strategies:  arts and crafts: drawing, play with my pet , listen to positive and upbeat music: any, " "change body temperature (ice pack/cold water)  and paced breathing/progressive muscle relaxation  ? Physical Activities: go for a walk, deep breathing and stretching   ? Focus on helpful thoughts:  \"You've been through this before, you can get through it again.\"  Step 3: People and social settings that provide distraction:                 Name: Carmen                            Name: Darien                           Name: Aleida       ? pool, shopping, Carmen's house, [employer]       Step 4: Remind myself of people and things that are important to me and worth living for:  Sidney, Clifford, Aleida, post-COVID world, options of what could be in your future        Step 5: When I am in crisis, I can ask these people to help me use my safety plan:                 Name: Sidney  Step 6: Making the environment safe:   ? go to sleep/daydream  Step 7: Professionals or agencies I can contact during a crisis:  ? [facility] Daytime Number: [phone]  ? Suicide Prevention Lifeline: 8-643-284-PYPQ (8818)  ? Crisis Text Line Service (available 24 hours a day, 7 days a week): Text [place] to 318876    Local Crisis Services: [facility] Crisis: [phone]  Adults can always access to the emPATH unit at [facility] (no phone number, utilize it like an urgent care or ER where you just show up)     Call 911 or go to my nearest emergency department.       I helped develop this safety plan and agree to use it when needed.  I have been given a copy of this plan.       Client signature _________________________________________________________________  Today s date:  11/24/2020  Adapted from Safety Plan Template 2008 Randi Poole and Robby Barba is reprinted with the express permission of the authors.  No portion of the Safety Plan Template may be reproduced without the express, written permission.  You can contact the authors at [email or web address] or [email or web address].  "

## 2023-04-25 NOTE — GROUP NOTE
Process Group Note    PATIENT'S NAME: Randi Cleary  MRN:   [id number]  :   1955  ACCT. NUMBER: [id number]  DATE OF SERVICE: 23  START TIME:  9:00 AM  END TIME:  9:50 AM  FACILITATOR: Brittani Molina [facility]  TOPIC:  Process Group    Diagnoses:   296.33 (F33.2) Major Depressive Disorder, Recurrent Episode, Severe With anxious distress  300.02 (F41.1) Generalized Anxiety Disorder    Rule out:  296.89 Bipolar II Disorder vs. 314.01 (F90.2) Attention-Deficit/Hyperactivity Disorder   Combined presentation         Service Modality:  Video Visit     Telemedicine Visit: The patient's condition can be safely assessed and treated via synchronous audio and visual telemedicine encounter.       Reason for Telemedicine Visit: Services only offered telehealth and due to COVID-19.     Originating Site (Patient Location): Patient's home     Distant Site (Provider Location): Provider Remote Setting- Home Office     Consent:  The patient/guardian has verbally consented to: the potential risks and benefits of telemedicine (video visit) versus in person care; bill my insurance or make self-payment for services provided; and responsibility for payment of non-covered services.      Patient would like the video invitation sent by:  My Chart     Mode of Communication:  Video Conference via Medical Zoom     As the provider I attest to compliance with applicable laws and regulations related to telemedicine.    [facility] 55+ Program  TRACK: A1    NUMBER OF PARTICIPANTS: 8          Data:    Session content: At the start of this group, patients were invited to check in by identifying themselves, describing their current emotional status, and identifying issues to address in this group.   Area(s) of treatment focus addressed in this session included Symptom Management, Personal Safety, Community Resources/Discharge Planning, Abstinence/Relapse Prevention, Develop / Improve Independent Living Skills and Develop  Socialization / Interpersonal Relationship Skills.    Winnie reports anxious and less depressed mood. Denies S/I or safety issues. She processed interpersonal relationship marital stress with her  related to getting taxes done. Winnie is aware of boundary formulation strategies. She continues to deal with co-morbid physical health issues.       Therapeutic Interventions/Treatment Strategies:  Psychotherapist offered support, feedback and validation and reinforced use of skills. Treatment modalities used include Cognitive Behavioral Therapy.    Assessment:    Patient response:   Patient responded to session by accepting feedback, giving feedback, listening, focusing on goals, being attentive and accepting support    Possible barriers to participation / learning include: and no barriers identified    Health Issues:   Yes: Chronic disease management, Associated Psychological Distress       Medication compliance  Substance Use Review:   Substance Use: No active concerns identified.    Mental Status/Behavioral Observations  Appearance:   Appropriate   Eye Contact:   Good   Psychomotor Behavior: Normal   Attitude:   Cooperative  Interested  Orientation:   All  Speech   Rate / Production: Normal    Volume:  Normal   Mood:    Anxious  Depressed   Affect:    Appropriate   Thought Content:   Clear and Safety denies any current safety concerns including suicidal ideation, self-harm, and homicidal ideation  Thought Form:  Coherent  Goal Directed  Logical     Insight:    Good     Plan:     Safety Plan: No current safety concerns identified.  Recommended that patient call 911 or go to the local ED should there be a change in any of these risk factors.     Barriers to treatment: None identified    Patient Contracts (see media tab):  None    Substance Use: Not addressed in session     Continue or Discharge: Patient will continue in 55+ Program (55+) as planned. Patient is likely to benefit from learning and using skills as they  work toward the goals identified in their treatment plan.      Brittani Molina, [facility]  April 25, 2023

## 2023-04-25 NOTE — GROUP NOTE
Psychotherapy Group Note    PATIENT'S NAME: Randi Cleary  MRN:   [id number]  :   1955  ACCT. NUMBER: [id number]  DATE OF SERVICE: 23  START TIME: 10:00 AM  END TIME: 10:50 AM  FACILITATOR: Brittani Molina LICSW  TOPIC: MH EBP Group: Cognitive Restructuring  Service Modality:  Video Visit     Telemedicine Visit: The patient's condition can be safely assessed and treated via synchronous audio and visual telemedicine encounter.       Reason for Telemedicine Visit: Services only offered telehealth and due to COVID-19.     Originating Site (Patient Location): Patient's home     Distant Site (Provider Location): Provider Remote Setting- Home Office     Consent:  The patient/guardian has verbally consented to: the potential risks and benefits of telemedicine (video visit) versus in person care; bill my insurance or make self-payment for services provided; and responsibility for payment of non-covered services.      Patient would like the video invitation sent by:  My Chart     Mode of Communication:  Video Conference via Medical Zoom     As the provider I attest to compliance with applicable laws and regulations related to telemedicine.     [facility] 55+ Program  TRACK: A1    NUMBER OF PARTICIPANTS: 8    Summary of Group / Topics Discussed:  Cognitive Restructuring: Distortions: Patients received an overview of how to identify common cognitive distortions. Patients will explore alternatives to cognitive distortions and practice challenging their negative thought patterns. The goal is to help patients target modify ineffective thought patterns.     Patient Session Goals / Objectives:    Familiarized self with ineffective / unhealthy thoughts and how they develop.      Explored impact of ineffective thoughts / distortions on mood and activity    Formulated new neutral/positive alternatives to challenge less helpful / ineffective thoughts.    Practiced and plan to apply in daily  life               Patient Participation / Response:  Fully participated with the group by sharing personal reflections / insights and openly received / provided feedback with other participants.    Demonstrated understanding of topics discussed through group discussion and participation and Practiced skills in session    Treatment Plan:  Patient has a current master individualized treatment plan.  See Epic treatment plan for more information.    Brittani Molina, LICSW

## 2023-04-25 NOTE — GROUP NOTE
Psychotherapy Group Note    PATIENT'S NAME: Randi Cleary  MRN:   [id number]  :   1955  ACCT. NUMBER: [id number]  DATE OF SERVICE: 23  START TIME: 11:00 AM  END TIME: 11:50 AM  FACILITATOR: Luh Hare LGSW  TOPIC: MH EBP Group: Specialty Awareness  [facility] 55+ Program  TRACK: A1    NUMBER OF PARTICIPANTS: 8    Summary of Group / Topics Discussed:  Specialty Topics: Hope: The topic of hope was presented in order to help patients better understand the symptoms of hopelessness and how to become more hopeful. Patients discussed their current awareness of the topic and relevance to their functioning. Individual experiences with symptoms and treatment options were also discussed. Patients explored options for ongoing/future treatment and symptom management.      Patient Session Goals / Objectives:    Discussed definition of hopelessness    Discussed how hopelessness impacts functioning    Set a plan to utilize skills to reduce hopelessness                                    Service Modality:  Video Visit     Telemedicine Visit: The patient's condition can be safely assessed and treated via synchronous audio and visual telemedicine encounter.      Reason for Telemedicine Visit: Services only offered telehealth    Originating Site (Patient Location): Patient's home    Distant Site (Provider Location): [facility]: [facility]    Consent:  The patient/guardian has verbally consented to: the potential risks and benefits of telemedicine (video visit) versus in person care; bill my insurance or make self-payment for services provided; and responsibility for payment of non-covered services.     Patient would like the video invitation sent by:  My Chart    Mode of Communication:  Video Conference via Medical Zoom    As the provider I attest to compliance with applicable laws and regulations related to telemedicine.                               Patient Participation / Response:  Moderately  participated, sharing some personal reflections / insights and adequately adequately received / provided feedback with other participants.    Demonstrated understanding of topics discussed through group discussion and participation and Identified / Expressed readiness to act on skill suggestions discussed in topic    Treatment Plan:  Patient has a current master individualized treatment plan.  See Epic treatment plan for more information.    Luh Hare, LGSW

## 2023-04-26 ENCOUNTER — TELEPHONE (OUTPATIENT)
Dept: NEUROSURGERY | Facility: CLINIC | Age: 68
End: 2023-04-26
Payer: MEDICARE

## 2023-04-26 ENCOUNTER — HOSPITAL ENCOUNTER (OUTPATIENT)
Dept: BEHAVIORAL HEALTH | Facility: CLINIC | Age: 68
Discharge: HOME | End: 2023-04-26
Attending: PSYCHIATRY & NEUROLOGY
Payer: MEDICARE

## 2023-04-26 ENCOUNTER — TELEPHONE (OUTPATIENT)
Dept: BEHAVIORAL HEALTH | Facility: CLINIC | Age: 68
End: 2023-04-26
Payer: MEDICARE

## 2023-04-26 DIAGNOSIS — M54.12 CERVICAL RADICULOPATHY: Primary | Chronic | ICD-10-CM

## 2023-04-26 DIAGNOSIS — F31.81 BIPOLAR 2 DISORDER (H): Primary | ICD-10-CM

## 2023-04-26 PROCEDURE — 90853 GROUP PSYCHOTHERAPY: CPT | Mod: GT

## 2023-04-26 PROCEDURE — 90853 GROUP PSYCHOTHERAPY: CPT | Mod: PO | Performed by: SOCIAL WORKER

## 2023-04-26 ASSESSMENT — ENCOUNTER SYMPTOMS
SMELL DISTURBANCE: 0
HEADACHES: 1
JAUNDICE: 0
POLYDIPSIA: 0
BLOOD IN STOOL: 0
INSOMNIA: 1
HYPERTENSION: 0
NECK MASS: 0
BOWEL INCONTINENCE: 0
WEIGHT LOSS: 0
ABDOMINAL PAIN: 0
SPEECH CHANGE: 0
HALLUCINATIONS: 0
SPUTUM PRODUCTION: 1
DYSPNEA ON EXERTION: 1
INCREASED ENERGY: 1
SYNCOPE: 0
TASTE DISTURBANCE: 0
HEARTBURN: 1
SINUS PAIN: 0
FEVER: 0
EXERCISE INTOLERANCE: 1
HEMOPTYSIS: 0
POSTURAL DYSPNEA: 0
DIZZINESS: 1
LOSS OF CONSCIOUSNESS: 0
DIARRHEA: 1
DISTURBANCES IN COORDINATION: 1
SORE THROAT: 0
RECTAL PAIN: 0
BACK PAIN: 0
SEIZURES: 0
TROUBLE SWALLOWING: 0
FLANK PAIN: 0
TREMORS: 0
BREAST PAIN: 1
PANIC: 1
HEMATURIA: 0
BREAST MASS: 1
DECREASED APPETITE: 0
BLOATING: 0
DIFFICULTY URINATING: 0
ARTHRALGIAS: 1
MUSCLE WEAKNESS: 1
NAUSEA: 0
TINGLING: 1
SLEEP DISTURBANCES DUE TO BREATHING: 0
POLYPHAGIA: 0
VOMITING: 0
ALTERED TEMPERATURE REGULATION: 1
HOARSE VOICE: 0
NUMBNESS: 1
ORTHOPNEA: 0
WEAKNESS: 1
DECREASED CONCENTRATION: 1
COUGH: 1
WEIGHT GAIN: 0
JOINT SWELLING: 1
SINUS CONGESTION: 1
MUSCLE CRAMPS: 1
DEPRESSION: 1
SKIN CHANGES: 0
SHORTNESS OF BREATH: 1
SNORES LOUDLY: 1
POOR WOUND HEALING: 0
NECK PAIN: 1
COUGH DISTURBING SLEEP: 0
WHEEZING: 0
LEG PAIN: 0
NERVOUS/ANXIOUS: 1
LIGHT-HEADEDNESS: 1
DYSURIA: 0
PALPITATIONS: 0
HYPOTENSION: 0
FATIGUE: 1
CHILLS: 0
CONSTIPATION: 0
STIFFNESS: 1
MEMORY LOSS: 0
NIGHT SWEATS: 1
PARALYSIS: 0
MYALGIAS: 1
NAIL CHANGES: 0

## 2023-04-26 NOTE — GROUP NOTE
Psychoeducation Group Note    PATIENT'S NAME: Randi Cleary  MRN:   [id number]  :   1955  ACCT. NUMBER: [id number]  DATE OF SERVICE: 23  START TIME: 11:00 AM  END TIME: 11:50 AM  FACILITATOR: Luh Hare LGSW  TOPIC: MH Wellness Group: Health Maintenance  [facility] 55+ Program  TRACK: A1    NUMBER OF PARTICIPANTS: 7    Summary of Group / Topics Discussed:  Health Maintenance: Discharge planning/Community resources: Patients worked on completing an instructor-facilitated discharge planning activity. Discharge planning begins for all patients after admission. Competent discharge planning promotes a successful transition and decreases the likelihood of mental health relapse. In this group, all dimensions of wellness were reviewed to assess for needs/discharge readiness. These dimensions included: physical, emotional, occupational/productivity, environmental, social, spiritual, intellectual, and financial. Patients worked on completing/updating their discharge planning and identifying their treatment needs prior to time of discharge.     Patient Session Goals / Objectives:  ? Identified unmet treatment needs to accomplish before discharge  ? Completed all dimensions of the discharge planning packet  ? Participated in the planning process, make phone calls, set up appointments, got connected with community resources, followed up with treatment team as needed                                         Service Modality:  Video Visit     Telemedicine Visit: The patient's condition can be safely assessed and treated via synchronous audio and visual telemedicine encounter.      Reason for Telemedicine Visit: Services only offered telehealth    Originating Site (Patient Location): Patient's home    Distant Site (Provider Location): Provider Remote Setting- Home Office    Consent:  The patient/guardian has verbally consented to: the potential risks and benefits of telemedicine (video visit) versus in  person care; bill my insurance or make self-payment for services provided; and responsibility for payment of non-covered services.     Patient would like the video invitation sent by:  My Chart    Mode of Communication:  Video Conference via Medical Zoom    As the provider I attest to compliance with applicable laws and regulations related to telemedicine.                               Patient Participation / Response:  Fully participated with the group by sharing personal reflections / insights and openly received / provided feedback with other participants.    Demonstrated understanding of topics discussed through group discussion and participation, Identified / Expressed personal readiness to practice skills and Verbalized understanding of health maintenance topic    Treatment Plan:  Patient has a current master individualized treatment plan.  See Epic treatment plan for more information.    Luh Hare LGSW

## 2023-04-26 NOTE — GROUP NOTE
Process Group Note    PATIENT'S NAME: Randi Cleary  MRN:   [id number]  :   1955  ACCT. NUMBER: [id number]  DATE OF SERVICE: 23  START TIME:  9:00 AM  END TIME:  9:50 AM  FACILITATOR: Brittani Molina [facility]  TOPIC:  Process Group    Diagnoses:   296.33 (F33.2) Major Depressive Disorder, Recurrent Episode, Severe With anxious distress  300.02 (F41.1) Generalized Anxiety Disorder    Rule out:  296.89 Bipolar II Disorder vs. 314.01 (F90.2) Attention-Deficit/Hyperactivity Disorder   Combined presentation         Service Modality:  Video Visit     Telemedicine Visit: The patient's condition can be safely assessed and treated via synchronous audio and visual telemedicine encounter.       Reason for Telemedicine Visit: Services only offered telehealth and due to COVID-19.     Originating Site (Patient Location): Patient's home     Distant Site (Provider Location): Provider Remote Setting- Home Office     Consent:  The patient/guardian has verbally consented to: the potential risks and benefits of telemedicine (video visit) versus in person care; bill my insurance or make self-payment for services provided; and responsibility for payment of non-covered services.      Patient would like the video invitation sent by:  My Chart     Mode of Communication:  Video Conference via Medical Zoom     As the provider I attest to compliance with applicable laws and regulations related to telemedicine.    [facility] 55+ Program  TRACK: A1    NUMBER OF PARTICIPANTS: 7          Data:    Session content: At the start of this group, patients were invited to check in by identifying themselves, describing their current emotional status, and identifying issues to address in this group.   Area(s) of treatment focus addressed in this session included Symptom Management, Personal Safety, Community Resources/Discharge Planning, Abstinence/Relapse Prevention, Develop / Improve Independent Living Skills, Develop  Socialization / Interpersonal Relationship Skills and Physical Health .    Winnie reports less depressed and less anxious mood. Denies S/I or safety issues. She is using coping skills for symptom management including CBT tool with cognitive monitoring. She is going to focus on having a more low key day with relaxation. She will engage in an errand and go for a walk. She will also make a script for her medical appointment tomorrow. Winnie continues to consult with her outpatient  therapist, [place].        Therapeutic Interventions/Treatment Strategies:  Psychotherapist offered support, feedback and validation and reinforced use of skills. Treatment modalities used include Cognitive Behavioral Therapy.    Assessment:    Patient response:   Patient responded to session by accepting feedback, giving feedback, listening, focusing on goals, being attentive, accepting support and verbalizing understanding    Possible barriers to participation / learning include: and no barriers identified    Health Issues:   Yes: Pain, Associated Psychological Distress  Chronic disease management, Associated Psychological Distress. Medication compliance.    She will start new medication tomorrow night.       Substance Use Review:   Substance Use: No active concerns identified.    Mental Status/Behavioral Observations  Appearance:   Appropriate   Eye Contact:   Good   Psychomotor Behavior: Normal   Attitude:   Cooperative  Interested  Orientation:   All  Speech   Rate / Production: Normal    Volume:  Normal   Mood:    Less depressed and less anxious mood  Affect:    Appropriate   Thought Content:   Clear  Thought Form:  Coherent  Goal Directed  Logical     Insight:    Good     Plan:     Safety Plan: No current safety concerns identified.  Recommended that patient call 911 or go to the local ED should there be a change in any of these risk factors.     Barriers to treatment: None identified    Patient Contracts (see media tab):  None    Substance  Use: Not addressed in session     Continue or Discharge: Patient will continue in 55+ Program (55+) as planned. Patient is likely to benefit from learning and using skills as they work toward the goals identified in their treatment plan.      Brittani Molina, [facility]  April 26, 2023

## 2023-04-26 NOTE — GROUP NOTE
Psychotherapy Group Note    PATIENT'S NAME: Randi Cleary  MRN:   [id number]  :   1955  ACCT. NUMBER: [id number]  DATE OF SERVICE: 23  START TIME: 10:00 AM  END TIME: 10:50 AM  FACILITATOR: Brittani Molina LICSW  TOPIC: MH EBP Group: Cognitive Restructuring  Service Modality:  Video Visit     Telemedicine Visit: The patient's condition can be safely assessed and treated via synchronous audio and visual telemedicine encounter.       Reason for Telemedicine Visit: Services only offered telehealth and due to COVID-19.     Originating Site (Patient Location): Patient's home     Distant Site (Provider Location): Provider Remote Setting- Home Office     Consent:  The patient/guardian has verbally consented to: the potential risks and benefits of telemedicine (video visit) versus in person care; bill my insurance or make self-payment for services provided; and responsibility for payment of non-covered services.      Patient would like the video invitation sent by:  My Chart     Mode of Communication:  Video Conference via Medical Zoom     As the provider I attest to compliance with applicable laws and regulations related to telemedicine.     [facility] 55+ Program  TRACK: A1    NUMBER OF PARTICIPANTS: 7    Summary of Group / Topics Discussed:  Cognitive Restructuring: Distortions: Patients received an overview of how to identify common cognitive distortions. Patients will explore alternatives to cognitive distortions and practice challenging their negative thought patterns. The goal is to help patients target modify ineffective thought patterns.     Patient Session Goals / Objectives:    Familiarized self with ineffective / unhealthy thoughts and how they develop.      Explored impact of ineffective thoughts / distortions on mood and activity    Formulated new neutral/positive alternatives to challenge less helpful / ineffective thoughts.    Practiced and plan to apply in daily  life               Patient Participation / Response:  Fully participated with the group by sharing personal reflections / insights and openly received / provided feedback with other participants.    Demonstrated understanding of topics discussed through group discussion and participation and Practiced skills in session    Treatment Plan:  Patient has a current master individualized treatment plan.  See Epic treatment plan for more information.    Brittani Molina, LICSW

## 2023-04-27 ENCOUNTER — OFFICE VISIT (OUTPATIENT)
Dept: NEUROSURGERY | Facility: CLINIC | Age: 68
End: 2023-04-27
Payer: MEDICARE

## 2023-04-27 ENCOUNTER — PRE VISIT (OUTPATIENT)
Dept: NEUROSURGERY | Facility: CLINIC | Age: 68
End: 2023-04-27

## 2023-04-27 ENCOUNTER — ANCILLARY PROCEDURE (OUTPATIENT)
Dept: GENERAL RADIOLOGY | Facility: CLINIC | Age: 68
End: 2023-04-27
Attending: NEUROLOGICAL SURGERY
Payer: MEDICARE

## 2023-04-27 VITALS
DIASTOLIC BLOOD PRESSURE: 92 MMHG | WEIGHT: 231 LBS | BODY MASS INDEX: 37.28 KG/M2 | RESPIRATION RATE: 16 BRPM | SYSTOLIC BLOOD PRESSURE: 143 MMHG | OXYGEN SATURATION: 94 % | HEART RATE: 96 BPM

## 2023-04-27 DIAGNOSIS — M54.12 CERVICAL RADICULOPATHY: Chronic | ICD-10-CM

## 2023-04-27 PROCEDURE — 72082 X-RAY EXAM ENTIRE SPI 2/3 VW: CPT | Performed by: STUDENT IN AN ORGANIZED HEALTH CARE EDUCATION/TRAINING PROGRAM

## 2023-04-27 PROCEDURE — 99417 PROLNG OP E/M EACH 15 MIN: CPT | Performed by: NEUROLOGICAL SURGERY

## 2023-04-27 PROCEDURE — 77073 BONE LENGTH STUDIES: CPT | Performed by: STUDENT IN AN ORGANIZED HEALTH CARE EDUCATION/TRAINING PROGRAM

## 2023-04-27 PROCEDURE — 99215 OFFICE O/P EST HI 40 MIN: CPT | Mod: FS | Performed by: NEUROLOGICAL SURGERY

## 2023-04-27 ASSESSMENT — PAIN SCALES - GENERAL: PAINLEVEL: EXTREME PAIN (8)

## 2023-04-27 NOTE — PATIENT INSTRUCTIONS
Cervical MRI ordered  Our team will contact you once the imaging is done if there are any abnormal findings that require you to return to be seen.
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
## 2023-04-27 NOTE — PROGRESS NOTES
Adult Mental Health Intensive Outpatient Discharge Summary/Instructions      Patient: Randi Cleary MRN: [id number]   : 1955 Age: 67 year old Sex: female     Admission Date: 2023  Discharge Date: 2023  Diagnosis:  296.33 (F33.2) Major Depressive Disorder, Recurrent Episode, Severe With anxious distress  300.02 (F41.1) Generalized Anxiety Disorder    Rule out:  296.89 Bipolar II Disorder vs. 314.01 (F90.2) Attention-Deficit/Hyperactivity Disorder   Combined presentation         Focus of Treatment / Progress    Personal Safety: Denies S/I or safety issues.      * Follow your safety plan     * Call crisis lines as needed:    [facility] [phone] [facility] [phone]  [facility] [phone] [facility] [phone]   [facility] [phone] National Suicide Prevention 1-728.334.9166  [facility] [phone] Suicide Prevention [phone]  [facility] [phone]    Managing symptoms of:  Depressed mood, anxious mood, periods of tearfulness, worry, rumination, helplessness, hopelessness, co-morbid physical health issues    Community support/health:   National [place] on Mental Illness (www.mary.org): 936.541.3758 or 080-832-9268.   Mental Health Association (www.mentalhealth.org): 749.895.5798 or 278-321-7383.  [place] Crisis Text Line: Text [place] to 864501  Suicide LifeLine Chat: suicidepreventionlifeline.org/chat  Senior Linkage Line 3-172-482-8804  Depression and Bipolar Support [place] (DBSA) https://www.dbsalliance.org/support/chapters-and-support-groups/online-support-groups/          Managing Symptoms and Preventing Relapse    * Go to all of your appointments    * Take all medications as directed.      * Carry a current list if medication with you    * Do not use illicit (street) drugs.  Avoid alcohol    * Report these symptoms to your care team. These are early signs of relapse:   Thoughts of suicide   Losing more sleep   Increased confusion   Mood  getting worse   Feeling more aggressive       *Use these skills daily:   Mindfulness, practice self-compassion, practice authenticity in making choices, maintain balance in schedule (time for self and others, time for relaxation and time for activities, time for leisure and time for tasks, time at home and time out of the house), assert needs and set limits with others as needed, practice intentional physical relaxation, challenge negative thoughts/use affirming and encouraging self-talk, engage with support people on regular basis, practice good self-care (sleep hygiene, balanced eating, regular rest, take care of medical needs, maintain personal hygiene, self-nurturing activities), acknowledge and accept your feelings, use sensory modulation skills plan     Copy of summary sent to: pt-Winnie    Follow up with psychiatrist / main caregiver[facility]    Next visit: Follow up with your appointment in one month    Referral made to [facility]    Follow up with your therapist: Mary Kay Weir at [facility]   Next visit: 5/9    Go to group therapy and / or support groups at: [facility], On 5/11 your first appointment will be via telemedicine through [facility] My Chart. Beginning the following week on May 18 you will be in person at [address] [facility], Check in Room NG 44 on Thursdays from 1:00-3:00. [phone]    GÓMEZ CABAN    See your medical doctor about: any physical health issues or concerns    Other:  [facility]    Client Signature:__Reviewed.  Unable to sign due to Virtual visit.__  Date / Time:__5/3/2023 9:00___  Staff Signature:__LIZBETH Prasad, [facility] _   Date / Time:_5/3/2023 9:00 _

## 2023-04-28 ENCOUNTER — HOSPITAL ENCOUNTER (OUTPATIENT)
Dept: BEHAVIORAL HEALTH | Facility: CLINIC | Age: 68
Discharge: HOME | End: 2023-04-28
Attending: PSYCHIATRY & NEUROLOGY
Payer: MEDICARE

## 2023-04-28 ENCOUNTER — VIRTUAL VISIT (OUTPATIENT)
Dept: PSYCHOLOGY | Facility: CLINIC | Age: 68
End: 2023-04-28
Payer: MEDICARE

## 2023-04-28 DIAGNOSIS — F31.81 BIPOLAR 2 DISORDER (H): Primary | ICD-10-CM

## 2023-04-28 DIAGNOSIS — F41.1 GENERALIZED ANXIETY DISORDER: ICD-10-CM

## 2023-04-28 DIAGNOSIS — F43.9 TRAUMA AND STRESSOR-RELATED DISORDER: ICD-10-CM

## 2023-04-28 PROCEDURE — 90853 GROUP PSYCHOTHERAPY: CPT | Mod: GT

## 2023-04-28 PROCEDURE — 99417 PROLNG OP E/M EACH 15 MIN: CPT | Mod: VID | Performed by: PSYCHIATRY & NEUROLOGY

## 2023-04-28 PROCEDURE — 90853 GROUP PSYCHOTHERAPY: CPT | Mod: GT | Performed by: SOCIAL WORKER

## 2023-04-28 PROCEDURE — 90834 PSYTX W PT 45 MINUTES: CPT | Mod: VID | Performed by: SOCIAL WORKER

## 2023-04-28 PROCEDURE — 99215 OFFICE O/P EST HI 40 MIN: CPT | Mod: VID | Performed by: PSYCHIATRY & NEUROLOGY

## 2023-04-28 NOTE — GROUP NOTE
Process Group Note    PATIENT'S NAME: Randi Cleary  MRN:   [id number]  :   1955  ACCT. NUMBER: [id number]  DATE OF SERVICE: 23  START TIME:  9:00 AM  END TIME:  9:50 AM  FACILITATOR: Brittani Molina [facility]  TOPIC:  Process Group    Diagnoses:   296.33 (F33.2) Major Depressive Disorder, Recurrent Episode, Severe With anxious distress  300.02 (F41.1) Generalized Anxiety Disorder    Rule out:  296.89 Bipolar II Disorder vs. 314.01 (F90.2) Attention-Deficit/Hyperactivity Disorder   Combined presentation         Service Modality:  Video Visit     Telemedicine Visit: The patient's condition can be safely assessed and treated via synchronous audio and visual telemedicine encounter.       Reason for Telemedicine Visit: Services only offered telehealth and due to COVID-19.     Originating Site (Patient Location): Patient's home     Distant Site (Provider Location): Provider Remote Setting- Home Office     Consent:  The patient/guardian has verbally consented to: the potential risks and benefits of telemedicine (video visit) versus in person care; bill my insurance or make self-payment for services provided; and responsibility for payment of non-covered services.      Patient would like the video invitation sent by:  My Chart     Mode of Communication:  Video Conference via Medical Zoom     As the provider I attest to compliance with applicable laws and regulations related to telemedicine.    [facility] 55+ Program  TRACK: A1    NUMBER OF PARTICIPANTS: 8          Data:    Session content: At the start of this group, patients were invited to check in by identifying themselves, describing their current emotional status, and identifying issues to address in this group.   Area(s) of treatment focus addressed in this session included Symptom Management, Personal Safety, Community Resources/Discharge Planning, Abstinence/Relapse Prevention, Develop / Improve Independent Living Skills and Develop  Socialization / Interpersonal Relationship Skills.    Winnie reports sad and anxious mood with worry. Denies S/I or safety issues. She process her frustration with medical appointment. She has a consultation with a provider yesterday which was stressful. She has to go for an MRI. She doesn't feel like she got answers. Winnie reports urges for compulsive behaviors: eat carbs and drink alcohol. She did not engage in behaviors. She did talk with her  who is her main support person. Winnie reported on aftercare plan. She continues to consult with her therapist and is interested in the 55+ Aftercare Clinic and will start on  5/11/2023.       Therapeutic Interventions/Treatment Strategies:  Psychotherapist offered support, feedback and validation and reinforced use of skills. Treatment modalities used include Cognitive Behavioral Therapy.    Assessment:    Patient response:   Patient responded to session by accepting feedback, giving feedback, listening, focusing on goals, being attentive, accepting support and verbalizing understanding    Possible barriers to participation / learning include: and no barriers identified    Health Issues:   Yes: Pain, Associated Psychological Distress  Chronic disease management, Associated Psychological Distress     Medication compliance.       Substance Use Review:   Substance Use: No active concerns identified.    Mental Status/Behavioral Observations  Appearance:   Appropriate   Eye Contact:   Good   Psychomotor Behavior: Normal   Attitude:   Cooperative  Interested  Orientation:   All  Speech   Rate / Production: Normal    Volume:  Normal   Mood:    Anxious  Sad   Affect:    Appropriate  Worrisome   Thought Content:   Clear and Safety denies any current safety concerns including suicidal ideation, self-harm, and homicidal ideation  Thought Form:  Coherent  Goal Directed  Logical     Insight:    Good     Plan:     Safety Plan: No current safety concerns identified.  Recommended that  patient call 911 or go to the local ED should there be a change in any of these risk factors.     Barriers to treatment: None identified    Patient Contracts (see media tab):  None    Substance Use: Not addressed in session     Continue or Discharge: Patient will continue in 55+ Program (55+) as planned. Patient is likely to benefit from learning and using skills as they work toward the goals identified in their treatment plan. Tentative discharge for May 5, 2023.      Brittani Molina, [facility]  April 28, 2023

## 2023-04-28 NOTE — PROGRESS NOTES
"[facility] Mental Health Outpatient Programs  Provider Interval History Note    Program (track): Intensive Outpatient Program (track A1, 55+)    PATIENT'S NAME: Randi Cleary  MRN:   [id number]  :   1955  ACCT. NUMBER: [id number]  DATE OF SERVICE: 23  CALL/VIDEO START TIME: 933  CALL/VIDEO END TIME: 1020      Interval History:  \"I don't have good news.\" Winnie presents today for follow-up and ongoing program supervision.   Endorses:    \"I was declined by [facility] and they said I could get care here [at [place]]\"  o Talked to PCP, who referred patient to neurosurgery  o Seen yesterday  o Referred for MRI  - Discussed reasoning behind this recommendation in light of frustration    Found rheumatologist, referred by a friend  o He's leaving his clinic; appointment canceled    Frustrated by appointments, complexity of medical system, ongoing health difficulties    Shoulders hurting due to recent paperwork related to income taxes    Outpatient psychiatric provider restarted oxcarbazepine    \"Thinking I should get on that lithium orotate again\"  o \"There was a time I was doing really well before the surgery\"  o Was also taking other supplements in addition to lithium orotate and oxcarbazepine    Symptoms/Systems:    Some intrusive suicidal ideation but able to control    Substance use:    \"I'm staying sober\"  o 2 years now    Reactions/thoughts about program:    \"I really appreciate the group and I'm sad to be leaving\"      Safety Assessment:    Suicidal ideation: see above    Thoughts of non-suicidal self-injury: denied    Recent self-injurious behavior: denied    Homicidal ideation: denied    Other safety concerns: denied      Medications:  Current Outpatient Medications   Medication Sig Dispense Refill     acetaminophen (TYLENOL) 325 MG tablet Take 2 tablets (650 mg) by mouth every 4 hours as needed for other (For optimal non-opioid multimodal pain management to " improve pain control.)       albuterol (PROVENTIL) (2.5 MG/3ML) 0.083% neb solution Take 1 vial (2.5 mg) by nebulization every 4 hours as needed for shortness of breath / dyspnea or wheezing 360 mL 5     albuterol (VENTOLIN HFA) 108 (90 Base) MCG/ACT inhaler Inhale 2 puffs into the lungs every 6 hours 18 g 11     Cholecalciferol (VITAMIN D3) 250 MCG (81542 UT) TABS Take 1 tablet by mouth daily 41443/day       Cyanocobalamin (VITAMIN B-12) 5000 MCG SUBL Place 2-3 sprays under the tongue daily Unknown dose. 2 or 3 sprays/day       ethacrynic acid (EDECRIN) 25 MG tablet Take 1 tablet (25 mg) by mouth every other day 45 tablet 3     Fluticasone-Umeclidin-Vilanterol (TRELEGY ELLIPTA) 200-62.5-25 MCG/INH oral inhaler Inhale 1 puff into the lungs daily 4 each 3     HYDROcodone-acetaminophen (NORCO) 5-325 MG tablet Take 1 tablet by mouth every 6 hours as needed for breakthrough pain or moderate to severe pain May fill 4/30 for 5/2 112 tablet 0     hydrOXYzine (ATARAX) 25 MG tablet Take 1 tablet (25 mg) by mouth every 8 hours as needed for anxiety 60 tablet 3     KLOR-CON 20 MEQ CR tablet Take 1 tablet (20 mEq total) by mouth 2 (two) times a day. 180 tablet 0     LITHIUM PO Take 25 mg by mouth daily OTC lithium oratate       magnesium 250 MG tablet Take 1 tablet by mouth 2 times daily       medical cannabis (Patient's own supply) See Admin Instructions (The purpose of this order is to document that the patient reports taking medical cannabis.  This is not a prescription, and is not used to certify that the patient has a qualifying medical condition.)  Flower       methocarbamol (ROBAXIN) 500 MG tablet Take 1 tablet (500 mg) by mouth 3 times daily 90 tablet 3     omeprazole (PRILOSEC) 20 MG DR capsule Take 1 capsule (20 mg) by mouth daily 90 capsule 3     OXcarbazepine (TRILEPTAL) 150 MG tablet One and one-half tab bedtime (Patient not taking: Reported on 4/27/2023) 45 tablet 3     rOPINIRole (REQUIP) 2 MG tablet One tab 3  pm, one bedtime, and one during night on waking 90 tablet 3     SYNTHROID 150 MCG tablet Take 1 tablet (150 mcg) by mouth daily 90 tablet 4     vitamin E 400 units TABS Take 800 Units by mouth daily           The above list was discussed briefly with patient today.     Patient is taking medications as prescribed and denies adverse effects      Laboratory Results:  Most recent labs reviewed. Pertinent updates/findings: None.     Metrics:  PHQ-9 scores:       3/13/2023     8:02 AM 3/20/2023    10:35 AM 4/7/2023    12:25 PM 4/25/2023    12:02 PM   PHQ-9 SCORE   PHQ-9 Total Score MyChart 16 (Moderately severe depression) 15 (Moderately severe depression) 14 (Moderate depression) 11 (Moderate depression)   PHQ-9 Total Score 16 15 14 11       CHAVO-7 scores:       3/24/2023     9:06 AM 4/7/2023    12:26 PM 4/25/2023    12:02 PM   CHAVO-7 SCORE   Total Score 15 (severe anxiety) 10 (moderate anxiety) 10 (moderate anxiety)   Total Score 15    15    15    15 10 10    10        CSSR-S: [place] Suicide Severity Rating Scale (Short Version)      8/12/2020     3:00 PM 12/21/2021    11:31 AM 1/9/2023     1:00 PM   [place] Suicide Severity Rating (Short Version)   Over the past 2 weeks have you felt down, depressed, or hopeless? yes yes    Over the past 2 weeks have you had thoughts of killing yourself? no no    Have you ever attempted to kill yourself? no no    Q1 Wished to be Dead (Past Month)   yes   Q2 Suicidal Thoughts (Past Month)   no   Q3 Suicidal Thought Method   no   Q4 Suicidal Intent without Specific Plan   no   Q5 Suicide Intent with Specific Plan   no   Q6 Suicide Behavior (Lifetime)   yes   Within the Past 3 Months?   no   Level of Risk per Screen   moderate risk           Mental Status Examination (limited due to video virtual visit format):  Vital Signs: There were no vitals taken for this virtual visit.  Appearance: appropriately groomed, appears stated age, and in mild distress.  Attitude: cooperative   Eye  "Contact: good to the extent that can be determined in a video visit  Muscle Strength and Tone: no gross abnormalities based on remote observation  Psychomotor Behavior: Appropriate and initially restless but calmer over the course of the interview; no evidence of tardive dyskinesia, dystonia, or tics based on remote observation  Gait and Station: normal, no gross abnormalities based on remote observation  Speech: normal rate, production, volume, and rhythm of  Associations: No loosening of associations  Thought Process: coherent and goal directed  Thought Content: no evidence of suicidal ideation or homicidal ideation, no evidence of psychotic thought, no auditory hallucinations present and no visual hallucinations present  Mood: \"depressed\"  Affect: mood congruent, intensity is blunted, constricted mobility and reactive  Insight: good  Judgment: intact, adequate for safety  Impulse Control: intact  Oriented to: time, place, person and situation  Attention Span and Concentration: normal  Language: Intact  Recent and Remote Memory: intact to interview. Not formally assessed. No amnesia.  Fund of Knowledge/Assessment of Intelligence: Average  Capacity of Activities of Daily Living: Independent, able to participate in programmatic care services.    Diagnosis/es:    ICD-10-CM    1. Bipolar 2 disorder (H)  F31.81       2. Generalized anxiety disorder  F41.1           Assessment/Plan:  Winnie presents today for follow up. Endorses ongoing frustration with medical care and the complexity of her illness(es). Discussed the limitations of a CT scan and why an MRI is a better choice for the diagnostic questions at hand. Discussed that she can take hydroxyzine if needed for anxiety during the MRI. Also suggested she speak with the neurosurgery team and/or her primary care provider about a referral for physical therapy. Patient endorses she was also more active last year and would like to try and lose weight to help with her joint " difficulties. I recommended that she start to get back to her previous exercise routine.    Agree with restarting oxcarbazepine, and I support restarting lithium orotate and the other supplements she was previously taking. I will make no changes to medication today.    Patient is reaching maximum benefit at this level of care and will be ready for discharge in the coming weeks as originally planned. She will step down to our once weekly clinic and I will visit with her again in 6 to 8 weeks to discuss progress and offer additional support as needed.      Bipolar disorder  o Overall improved   o Continue current medications; agree with current directions and care  o Anticipate discharge from IOP soon  o Agree with step down to once weekly clinic      Anxiety  o Overall improved  o Still quite severe  o Plan as noted above    Continue all other medications as reviewed per electronic medical record today    Continue therapy as planned:    Enrolled in intensive outpatient    Reaching maximum benefit at this level of care; anticipate discharge soon     Patient would be at reasonable risk of requiring a higher level of care in the absence of current services.    I feel this patient does not meet criteria for an involuntary hold and is appropriate for treatment at an outpatient level of care.    Continue with individual therapist as appropriate    Safety plan reviewed:    To the Emergency Department as needed or call after hours crisis line at [phone] or [phone]. [place] Crisis Text Line: Text [place] to 602373 or Suicide LifeLine Chat: suicidepreventionlifeline.org/chat    Follow-up:     schedule an appointment with me or another program provider in approximately in 6 week(s) or sooner if needed.  Can speak with a staff member or call the appropriate program number (see below) to schedule    Follow up with outpatient provider(s) as planned or sooner if needed for acute medical concerns.    Questions or  concerns:    Call program line with questions or concerns (see below)    Spotwisehart may be used to communicate with your provider, but this is not intended to be used for emergencies.      [facility] Adult Mental Health Program lines:  Partial Hospital: [phone]  Dual Disorder: [phone]  Adult Day Treatment:  [phone]  55+/Intensive Outpatient: [phone]    Community Resources:    National Suicide Prevention Lifeline: 988 from any phone, or 063-221-4498 (TTY: 542.968.9620). Call anytime for help.  (www.suicidepreventionlifeline.org)  National Needles on Mental Illness (www.mary.org): 901.349.8226 or 111-335-8392.   Mental Health Association (www.mentalhealth.org): 744.530.3381 or 031-697-9782.  [place] Crisis Text Line: Text [place] to 723727  Suicide LifeLine Chat: suicideBlue Lava Groupline.org/chat    Treatment Objective(s) Addressed in This Session:  The purpose of today's virtual visit is for this writer to provide oversight of patient's care while receiving program services. Specific treatment goals addressed included personal safety, symptoms stabilization and management, wellness and mental health, and community resources/discharge planning.     This author or another program provider will follow up with the patient as noted above.     Patient agrees with the current plan of care.    Roland Das MD  4/28/23      Visit Details:  Type of service:  Video Visit    Start/End Time: see above    Originating Location (pt. Location): Home in [place]    Distant Location (provider location): [facility] Outpatient Setting: [facility] Adult Mental Health Outpatient Programmatic Care    Platform used for Video Visit: Zoom    Physician has received verbal consent for a Video Visit from the patient? Yes    70 minutes spent on the date of the encounter doing chart review, patient visit, documentation and discussion with other provider(s)     This document completed in part using Dragon  Medical One dictation software.  Please excuse any inadvertent word or phrase substitutions.

## 2023-04-28 NOTE — GROUP NOTE
Psychotherapy Group Note    PATIENT'S NAME: Randi Cleary  MRN:   [id number]  :   1955  ACCT. NUMBER: [id number]  DATE OF SERVICE: 23  START TIME: 11:00 AM  END TIME: 11:50 AM  FACILITATOR: Luh Hare LGSW  TOPIC: MH EBP Group: Self-Awareness  [facility] 55+ Program  TRACK: A1    NUMBER OF PARTICIPANTS: 8    Summary of Group / Topics Discussed:  Self-Awareness: Positive Affirmations: Topic focused on assisting patients in creating positive affirmations and relating them to the management of mental health symptoms. Patients discussed the benefits of acknowledging their personal strengths and their impact on mood improvement, mindfulness, and perspective. Patients worked to increase time spent on recognition and appreciation of what is positive and working in their lives. The goal is to reduce rumination and negative thinking resulting in increased mindfulness and resilience. Patients will work to put skills into practice and problem-solve barriers.     Patient Session Goals / Objectives:    Identified personal strengths    Created positive self-affirmations    Identified barriers to recognition of personal strengths    Verbalized understanding of strategies to increase use of their strengths in management of daily symptoms                                      Service Modality:  Video Visit     Telemedicine Visit: The patient's condition can be safely assessed and treated via synchronous audio and visual telemedicine encounter.      Reason for Telemedicine Visit: Services only offered telehealth    Originating Site (Patient Location): Patient's home    Distant Site (Provider Location): Provider Remote Setting- Home Office    Consent:  The patient/guardian has verbally consented to: the potential risks and benefits of telemedicine (video visit) versus in person care; bill my insurance or make self-payment for services provided; and responsibility for payment of non-covered services.      Patient would like the video invitation sent by:  My Chart    Mode of Communication:  Video Conference via Medical Zoom    As the provider I attest to compliance with applicable laws and regulations related to telemedicine.                               Patient Participation / Response:  Fully participated with the group by sharing personal reflections / insights and openly received / provided feedback with other participants.    Demonstrated understanding of topics discussed through group discussion and participation, Identified / Expressed readiness to act intentionally, increase self-compassion, promote personal growth, Identified plan to address barriers to practicing skills that promote self-awareness  and Practiced skills in session    Treatment Plan:  Patient has a current master individualized treatment plan.  See Epic treatment plan for more information.    Luh Hare LGSW

## 2023-04-28 NOTE — GROUP NOTE
Psychotherapy Group Note    PATIENT'S NAME: Randi Cleary  MRN:   [id number]  :   1955  ACCT. NUMBER: [id number]  DATE OF SERVICE: 23  START TIME: 10:00 AM  END TIME: 10:50 AM  FACILITATOR: Brittani Molina LICSW  TOPIC: MH EBP Group: Symptom Awareness  Service Modality:  Video Visit     Telemedicine Visit: The patient's condition can be safely assessed and treated via synchronous audio and visual telemedicine encounter.       Reason for Telemedicine Visit: Services only offered telehealth and due to COVID-19.     Originating Site (Patient Location): Patient's home     Distant Site (Provider Location): Provider Remote Setting- Home Office     Consent:  The patient/guardian has verbally consented to: the potential risks and benefits of telemedicine (video visit) versus in person care; bill my insurance or make self-payment for services provided; and responsibility for payment of non-covered services.      Patient would like the video invitation sent by:  My Chart     Mode of Communication:  Video Conference via Medical Zoom     As the provider I attest to compliance with applicable laws and regulations related to telemedicine.     [facility] 55+ Program  TRACK: A1    NUMBER OF PARTICIPANTS: 8    Summary of Group / Topics Discussed:  Symptom Awareness: Mood Disorders: Patients received a general overview of mood disorders including depressive disorders and how it relates to their current symptoms. The purpose is to promote understanding of their diagnoses and how it impacts their functioning. Patients reviewed their current awareness of symptoms and diagnoses. Patients received information regarding diagnoses, etiology, cultural, and environmental factors as well as impact on functioning.     Patient Session Goals / Objectives:    Discussed patient individual symptoms and experiences    Reviewed diagnostic criteria and etiology of diagnoses     Educated and discussed the benefits of Coping  cards as a relapse prevention tool.      Patient Participation / Response:  Fully participated with the group by sharing personal reflections / insights and openly received / provided feedback with other participants.    Demonstrated understanding of topics discussed through group discussion and participation and Demonstrated understanding of how information regarding symptoms can assist in management of symptoms    Treatment Plan:  Patient has a current master individualized treatment plan.  See Epic treatment plan for more information.    Brittani Molina, LICSW

## 2023-04-28 NOTE — PROGRESS NOTES
[facility]                                     Progress Note    Patient Name: Randi Cleary  Date: 4/28/23         Service Type: Individual     Session Start Time: 12:35 PM  Session End Time: 1:20 PM     Session Length: 45 minutes    Session #: 174    Attendees: Client attended alone    Service Modality:  Video Visit:      Provider verified identity through the following two step process.  Patient provided:  Patient is known previously to provider    Telemedicine Visit: The patient's condition can be safely assessed and treated via synchronous audio and visual telemedicine encounter.      Reason for Telemedicine Visit: Patient convenience (e.g. access to timely appointments / distance to available provider)    Originating Site (Patient Location): Patient's home    Distant Site (Provider Location): Provider Remote Setting- Home Office    Consent:  The patient/guardian has verbally consented to: the potential risks and benefits of telemedicine (video visit) versus in person care; bill my insurance or make self-payment for services provided; and responsibility for payment of non-covered services.     Patient would like the video invitation sent by:  My Chart    Mode of Communication:  [facility]    Distant Location (Provider):  Off-site    As the provider I attest to compliance with applicable laws and regulations related to telemedicine.      DATA  Extended Session (53+ minutes): PROLONGED SERVICE IN THE OUTPATIENT SETTING REQUIRING DIRECT (FACE-TO-FACE) PATIENT CONTACT BEYOND THE USUAL SERVICE:    - Patient's presenting concerns require more intensive intervention than could be completed within the usual service  Interactive Complexity: No  Crisis: No        Progress Since Last Session (Related to Symptoms / Goals / Homework):   Symptoms: More stable despite ongoing stressors    Homework: Progress made  Work on environment  Get letter together for neurology appointment  Get  back to the pool    In the future:  Look at a budget  Get back to the pool  Talk to your primary care doctor about what's on your list (mood, neck, arthritis)         Episode of Care Goals: Satisfactory progress - ACTION (Actively working towards change); Intervened by reinforcing change plan / affirming steps taken     Current / Ongoing Stressors and Concerns:   Patient is currently socially isolated. She has a conflictual relationship with her .  She is getting minimal physical activity.  She has had several surgeries.      Treatment Objective(s) Addressed in This Session:   Patient will increase frequency of engaging her in ADLs.  Patient will track and record at least 5 pleasant exchanges with . Patient will be able to identify at least 5 positive traits about her .  Patient will reduce level of depressive and anxious features as evidenced by reduction in score on her CHAVO-7 and PHQ-9 (scores of 15 and 16 at first measurment, respectively).     Intervention:   Supportive Therapy: Processed her recent doctor's appointment and her frustration around this. Reviewed next steps. Talked about self-care in this time, including spending time with her friends. Reviewed homework and identified successes and barriers to completion.      The following assessments were completed by patient for this visit:  PHQ9:       2/14/2023     2:22 PM 2/27/2023    10:44 AM 3/13/2023     8:02 AM 3/20/2023    10:35 AM 4/7/2023    12:25 PM 4/25/2023    12:02 PM 4/28/2023    12:33 PM   PHQ-9 SCORE   PHQ-9 Total Score MyChart 18 (Moderately severe depression) 20 (Severe depression) 16 (Moderately severe depression) 15 (Moderately severe depression) 14 (Moderate depression) 11 (Moderate depression) 12 (Moderate depression)   PHQ-9 Total Score 18 20 16 15 14 11 12     GAD7:       1/30/2023     1:49 PM 2/5/2023     9:49 PM 2/20/2023    10:48 AM 3/9/2023     9:27 AM 3/24/2023     9:06 AM 4/7/2023    12:26 PM 4/25/2023     12:02 PM   CHAVO-7 SCORE   Total Score 17 (severe anxiety) 16 (severe anxiety) 17 (severe anxiety) 15 (severe anxiety) 15 (severe anxiety) 10 (moderate anxiety) 10 (moderate anxiety)   Total Score 17 16 17    17    17    17    17 15    15    15 15    15    15    15 10 10    10     PROMIS 10-Global Health (only subscores and total score):       12/27/2022     1:17 AM 1/5/2023    11:28 AM 2/5/2023     9:48 PM 3/9/2023     9:30 AM 4/7/2023    12:28 PM 4/19/2023     8:26 AM 4/25/2023    12:04 PM   PROMIS-10 Scores Only   Global Mental Health Score 6    6    6 5    5        5 5    5    5    5    5 6    6    6 7    7    7    7 8    8    8 7    7   Global Physical Health Score 8    8    8 8    8        8 8    8    8    8    8 6    6    6 8    8    8    8 7    7    7 10    10   PROMIS TOTAL - SUBSCORES 14    14    14 13    13        13 13    13    13    13    13 12    12    12 15    15    15    15 15    15    15 17    17       Information is confidential and restricted. Go to Review Flowsheets to unlock data.    Multiple values from one day are sorted in reverse-chronological order        ASSESSMENT: Current Emotional / Mental Status (status of significant symptoms):   Risk status (Self / Other harm or suicidal ideation)   Patient denies current fears or concerns for personal safety.   Patient denies current or recent suicidal ideation or behaviors.   Patient denies current or recent homicidal ideation or behaviors.   Patient denies current or recent self injurious behavior or ideation.   Patient denies other safety concerns.   Patient reports there has been no change in risk factors since their last session.     Patient reports there has been no change in protective factors since their last session.     A safety and risk management plan has been developed including: Patient consented to co-developed safety plan on 11/24/2020, updated 2/20/23.  Safety and risk management plan was reviewed.   Patient agreed to use safety plan  should any safety concerns arise.  A copy was made available to the patient.     Appearance:   Appropriate    Eye Contact:   Good    Psychomotor Behavior: Normal    Attitude:   Cooperative    Orientation:   All   Speech    Rate / Production: Normal/ Responsive    Volume:  Normal    Mood:    Normal   Affect:    Appropriate    Thought Content:  Clear    Thought Form:  Coherent    Insight:    Good      Medication Review:   No changes to current psychiatric medication(s)     Medication Compliance:   Yes     Changes in Health Issues:   None reported     Chemical Use Review:   Substance Use: Chemical use reviewed, no active concerns identified Nothing used since 2021.     Tobacco Use: No current tobacco use.      Diagnosis:  1. Bipolar 2 disorder (H)    2. Generalized anxiety disorder    3. Trauma and stressor-related disorder      Collateral Reports Completed:   Not Applicable    PLAN: (Patient Tasks / Therapist Tasks / Other)  Work on environment  Get back to the pool    In the future:  Look at a budget  Get back to the pool  Talk to your primary care doctor about what's on your list (mood, neck, arthritis)        There has been demonstrated improvement in functioning while patient has been engaged in psychotherapy/psychological service- if withdrawn the patient would deteriorate and/or relapse.     MICAELA SLADE, [facility]   2023                                                        ______________________________________________________________________    Individual Treatment Plan    Patient's Name: Randi Cleary  YOB: 1955    Date of Creation: 20  Date Treatment Plan Last Reviewed/Revised: 3/2/23    DSM5 Diagnoses: 296.89 Bipolar II Disorder Depressed, 300.02 (F41.1) Generalized Anxiety Disorder or Adjustment Disorders  309.89 (F43.8) Other Specified Trauma and Stressor Related Disorder  Psychosocial / Contextual Factors: Patient's entire family of origin has , she now  "has a sister-in-law and  as support.  Relationship with  is conflictual. She is recovering from surgeries  PROMIS (reviewed every 90 days): PROMIS-10 Scores  PROMIS 10-Global Health (only subscores and total score):   PROMIS-10 Scores Only 12/14/2021 3/15/2022 3/15/2022 3/15/2022 3/24/2022 4/1/2022 6/9/2022   Global Mental Health Score 6 6 6 6 8 12 12   Global Physical Health Score 9 9 9 9 8 11 10   PROMIS TOTAL - SUBSCORES 15 15 15 15 16 23 22       Referral / Collaboration:  Referral to another professional/service is not indicated at this time..    Anticipated number of session for this episode of care: 50  Anticipation frequency of session: Biweekly  Anticipated Duration of each session: 53 or more minutes due to intensity of trauma symptoms  Treatment plan will be reviewed in 90 days or when goals have been changed.   There has been demonstrated improvement in functioning while patient has been engaged in psychotherapy/psychological service- if withdrawn the patient would deteriorate and/or relapse.       MeasurableTreatment Goal(s) related to diagnosis / functional impairment(s)  Goal 1: Patient will \"jumpstart, getting going with the things I need to be doing around the house as far as picking up, doing things, trying to do something every day.  Also to lessen the animosity between me and my .\"    I will know I've met my goal when my shoulders are fixed and I can see.      Objective #A (Patient Action)    Patient will increase frequency of engaging her in ADLs.  Status: Continued - Date(s): 12/10/21, 3/9/22, 6/9/22, 9/2/22, 12/1/22, 3/2/23    Intervention(s)  Therapist will engage patient in CBT, specifically behavioral activation.    Objective #B  Patient will  track and record at least 5 pleasant exchanges with . Patient will be able to identify at least 5 positive traits about her  and how he relates to her.  Status: Continued - Date(s): 12/10/21, 3/9/22, 6/9/22, 9/2/22, " "12/1/22, 3/2/23    Intervention(s)  Therapist will teach assertiveness skills and assign homework related to relationship interactions.    Objective #C  Patient will reduce level of depressive and anxious features as evidenced by reduction in score on her CHAVO-7 and PHQ-9 (scores of 15 and 16 at first measurment, respectively).  Status: Continued - Date(s): 12/10/21, 3/9/22, 6/9/22, 9/2/22, 12/1/22, 3/2/23    Intervention(s)  Therapist will engage patient in person-centered therapy and CBT.    Patient has reviewed and agreed to the above plan.      MICAELA SLADE, [facility]  March 2, 2023                                                   Randi Cleary          SAFETY PLAN:  Step 1: Warning signs / cues (Thoughts, images, mood, situation, behavior) that a crisis may be developing:  ? Thoughts: \"I don't want to continue\" \"I am unwanted\"  ? Images: none  ? Thinking Processes: ruminating  ? Mood: anger  ? Behaviors: isolating/withdrawing , can't stop crying, not taking care of myself and not taking care of my responsibilities  ? Situations: small triggers, such as not being able to find something, or dropping something   Step 2: Coping strategies - Things I can do to take my mind off of my problems without contacting another person (relaxation technique, physical activity):  ? Distress Tolerance Strategies:  arts and crafts: drawing, play with my pet , listen to positive and upbeat music: any, change body temperature (ice pack/cold water)  and paced breathing/progressive muscle relaxation  ? Physical Activities: go for a walk, deep breathing and stretching   ? Focus on helpful thoughts:  \"You've been through this before, you can get through it again.\"  Step 3: People and social settings that provide distraction:                 Name: Carmen                            Name: Darien                           Name: Aleida       ? pool, shopping, Carmen's house, [employer]       Step 4: Remind myself of people and things that are " important to me and worth living for:  SidneyNaidaCliffordAleida blue, post-COVID world, options of what could be in your future        Step 5: When I am in crisis, I can ask these people to help me use my safety plan:                 Name: Sidney  Step 6: Making the environment safe:   ? go to sleep/daydream  Step 7: Professionals or agencies I can contact during a crisis:  ? [facility] Daytime Number: [phone]  ? Suicide Prevention Lifeline: 0-897-568-AXWD (8284)  ? Crisis Text Line Service (available 24 hours a day, 7 days a week): Text [place] to 906893    Local Crisis Services: [facility] Crisis: [phone]  Adults can always access to the emPATH unit at [facility] (no phone number, utilize it like an urgent care or ER where you just show up)     Call 911 or go to my nearest emergency department.       I helped develop this safety plan and agree to use it when needed.  I have been given a copy of this plan.       Client signature _________________________________________________________________  Today s date:  11/24/2020  Adapted from Safety Plan Template 2008 Randi Poole and Robby Barba is reprinted with the express permission of the authors.  No portion of the Safety Plan Template may be reproduced without the express, written permission.  You can contact the authors at [email or web address] or [email or web address].

## 2023-05-01 ENCOUNTER — TELEPHONE (OUTPATIENT)
Dept: PALLIATIVE MEDICINE | Facility: OTHER | Age: 68
End: 2023-05-01

## 2023-05-01 ENCOUNTER — ANCILLARY PROCEDURE (OUTPATIENT)
Dept: MRI IMAGING | Facility: CLINIC | Age: 68
End: 2023-05-01
Attending: PHYSICIAN ASSISTANT
Payer: MEDICARE

## 2023-05-01 ENCOUNTER — LAB (OUTPATIENT)
Dept: LAB | Facility: CLINIC | Age: 68
End: 2023-05-01
Payer: MEDICARE

## 2023-05-01 DIAGNOSIS — E83.119 HEMOCHROMATOSIS, UNSPECIFIED HEMOCHROMATOSIS TYPE: ICD-10-CM

## 2023-05-01 DIAGNOSIS — M25.50 PAIN IN JOINT, MULTIPLE SITES: Primary | ICD-10-CM

## 2023-05-01 DIAGNOSIS — M54.12 CERVICAL RADICULOPATHY: Chronic | ICD-10-CM

## 2023-05-01 LAB
BASOPHILS # BLD AUTO: 0.1 10E3/UL (ref 0–0.2)
BASOPHILS NFR BLD AUTO: 1 %
EOSINOPHIL # BLD AUTO: 0.1 10E3/UL (ref 0–0.7)
EOSINOPHIL NFR BLD AUTO: 1 %
ERYTHROCYTE [DISTWIDTH] IN BLOOD BY AUTOMATED COUNT: 12.5 % (ref 10–15)
FERRITIN SERPL-MCNC: 64 NG/ML (ref 11–328)
HCT VFR BLD AUTO: 53 % (ref 35–47)
HGB BLD-MCNC: 18.1 G/DL (ref 11.7–15.7)
IMM GRANULOCYTES # BLD: 0.1 10E3/UL
IMM GRANULOCYTES NFR BLD: 1 %
IRON BINDING CAPACITY (ROCHE): 293 UG/DL (ref 240–430)
IRON SATN MFR SERPL: 29 % (ref 15–46)
IRON SERPL-MCNC: 85 UG/DL (ref 37–145)
LYMPHOCYTES # BLD AUTO: 1.2 10E3/UL (ref 0.8–5.3)
LYMPHOCYTES NFR BLD AUTO: 11 %
MCH RBC QN AUTO: 33.3 PG (ref 26.5–33)
MCHC RBC AUTO-ENTMCNC: 34.2 G/DL (ref 31.5–36.5)
MCV RBC AUTO: 97 FL (ref 78–100)
MONOCYTES # BLD AUTO: 0.5 10E3/UL (ref 0–1.3)
MONOCYTES NFR BLD AUTO: 4 %
NEUTROPHILS # BLD AUTO: 9.1 10E3/UL (ref 1.6–8.3)
NEUTROPHILS NFR BLD AUTO: 82 %
NRBC # BLD AUTO: 0 10E3/UL
NRBC BLD AUTO-RTO: 0 /100
PLATELET # BLD AUTO: 213 10E3/UL (ref 150–450)
RBC # BLD AUTO: 5.44 10E6/UL (ref 3.8–5.2)
RETICS # AUTO: 0.08 10E6/UL (ref 0.03–0.1)
RETICS/RBC NFR AUTO: 1.5 % (ref 0.5–2)
WBC # BLD AUTO: 11 10E3/UL (ref 4–11)

## 2023-05-01 PROCEDURE — 36415 COLL VENOUS BLD VENIPUNCTURE: CPT | Performed by: PATHOLOGY

## 2023-05-01 PROCEDURE — 85025 COMPLETE CBC W/AUTO DIFF WBC: CPT | Performed by: PATHOLOGY

## 2023-05-01 PROCEDURE — 83529 ASAY OF INTERLEUKIN-6 (IL-6): CPT | Performed by: PATHOLOGY

## 2023-05-01 PROCEDURE — G1010 CDSM STANSON: HCPCS | Mod: GC | Performed by: RADIOLOGY

## 2023-05-01 PROCEDURE — 83550 IRON BINDING TEST: CPT | Performed by: PATHOLOGY

## 2023-05-01 PROCEDURE — 83540 ASSAY OF IRON: CPT | Performed by: PATHOLOGY

## 2023-05-01 PROCEDURE — 83520 IMMUNOASSAY QUANT NOS NONAB: CPT | Performed by: PATHOLOGY

## 2023-05-01 PROCEDURE — 85045 AUTOMATED RETICULOCYTE COUNT: CPT | Performed by: PATHOLOGY

## 2023-05-01 PROCEDURE — 82728 ASSAY OF FERRITIN: CPT | Performed by: PATHOLOGY

## 2023-05-01 PROCEDURE — 72141 MRI NECK SPINE W/O DYE: CPT | Mod: ME | Performed by: RADIOLOGY

## 2023-05-01 PROCEDURE — 86038 ANTINUCLEAR ANTIBODIES: CPT | Performed by: ANESTHESIOLOGY

## 2023-05-01 NOTE — TELEPHONE ENCOUNTER
[facility]    Phone Message    May a detailed message be left on voicemail: yes     Reason for Call: Medication Refill Request    Has the patient contacted the pharmacy for the refill? Yes   Name of medication being requested: HYDROcodone-acetaminophen (NORCO) 5-325 MG tablet  Provider who prescribed the medication: Dr. Dubois  Pharmacy: [id number] [place] [address] [address]  Date medication is needed: 5/4/23    Patient reminded of 5-7 day refill policy        Action Taken: Message routed to:  Other[facility]    Travel Screening: Not Applicable

## 2023-05-02 ENCOUNTER — HOSPITAL ENCOUNTER (OUTPATIENT)
Dept: BEHAVIORAL HEALTH | Facility: CLINIC | Age: 68
Discharge: HOME | End: 2023-05-02
Attending: PSYCHIATRY & NEUROLOGY
Payer: MEDICARE

## 2023-05-02 LAB
ANA SER QL IF: NEGATIVE
IL6 SERPL-MCNC: 55.14 PG/ML

## 2023-05-02 PROCEDURE — 90853 GROUP PSYCHOTHERAPY: CPT | Mod: GT

## 2023-05-02 PROCEDURE — 90853 GROUP PSYCHOTHERAPY: CPT | Mod: GT | Performed by: SOCIAL WORKER

## 2023-05-02 NOTE — GROUP NOTE
Process Group Note    PATIENT'S NAME: Randi Cleary  MRN:   [id number]  :   1955  ACCT. NUMBER: [id number]  DATE OF SERVICE: 23  START TIME:  9:00 AM  END TIME:  9:50 AM  FACILITATOR: Brittani Molina [facility]  TOPIC:  Process Group    Diagnoses:   296.33 (F33.2) Major Depressive Disorder, Recurrent Episode, Severe With anxious distress  300.02 (F41.1) Generalized Anxiety Disorder    Rule out:  296.89 Bipolar II Disorder vs. 314.01 (F90.2) Attention-Deficit/Hyperactivity Disorder   Combined presentation         Service Modality:  Video Visit     Telemedicine Visit: The patient's condition can be safely assessed and treated via synchronous audio and visual telemedicine encounter.       Reason for Telemedicine Visit: Services only offered telehealth and due to COVID-19.     Originating Site (Patient Location): Patient's home     Distant Site (Provider Location): Provider Remote Setting- Home Office     Consent:  The patient/guardian has verbally consented to: the potential risks and benefits of telemedicine (video visit) versus in person care; bill my insurance or make self-payment for services provided; and responsibility for payment of non-covered services.      Patient would like the video invitation sent by:  My Chart     Mode of Communication:  Video Conference via Medical Zoom     As the provider I attest to compliance with applicable laws and regulations related to telemedicine.    [facility]+ Program  TRACK: A1    NUMBER OF PARTICIPANTS: 6          Data:    Session content: At the start of this group, patients were invited to check in by identifying themselves, describing their current emotional status, and identifying issues to address in this group.   Area(s) of treatment focus addressed in this session included Symptom Management, Personal Safety, Community Resources/Discharge Planning, Abstinence/Relapse Prevention, Develop / Improve Independent Living Skills, Develop  Socialization / Interpersonal Relationship Skills and Physical Health .    Winnie reports less depressed and less anxious mood. Denies S/I or safety issues. Winnie processed how she is coping with co-morbid physical health symptoms and pain. She had an MRI which did not show anything abnormal. Winnie is using coping skills for symptom management. She also uses medical marijuana. She began to discuss the transition from the group. She has aftercare resources: an outpatient therapist, Mary [facility] and [facility]. She will transition to the 55+ Clinic on 5/11/2023. Discharge [facility] on 5/5/2023.        Therapeutic Interventions/Treatment Strategies:  Psychotherapist offered support, feedback and validation and reinforced use of skills. Treatment modalities used include Cognitive Behavioral Therapy.    Assessment:    Patient response:   Patient responded to session by accepting feedback, giving feedback, listening, focusing on goals, being attentive and demonstrating behavior change    Possible barriers to participation / learning include: and no barriers identified    Health Issues:   Yes: Pain, Associated Psychological Distress  Chronic disease management, Associated Psychological Distress     Medication compliance. She started her new medication last night. She continues to work with provider.       Substance Use Review:   Substance Use: No active concerns identified.    Mental Status/Behavioral Observations  Appearance:   Appropriate   Eye Contact:   Good   Psychomotor Behavior: Normal   Attitude:   Cooperative  Interested  Orientation:   All  Speech   Rate / Production: Normal    Volume:  Normal   Mood:    Less depressed and less anxious mood  Affect:    Appropriate   Thought Content:   Clear and Safety denies any current safety concerns including suicidal ideation, self-harm, and homicidal ideation  Thought Form:  Coherent  Goal Directed  Logical     Insight:    Good     Plan:      Safety Plan: No current safety concerns identified.  Recommended that patient call 911 or go to the local ED should there be a change in any of these risk factors.     Barriers to treatment: None identified    Patient Contracts (see media tab):  None    Substance Use: Not addressed in session     Continue or Discharge: Patient will continue in 55+ Program (55+) as planned. Patient is likely to benefit from learning and using skills as they work toward the goals identified in their treatment plan. Tentative discharge for 5/5/203.      Brittani Molina, [facility]  May 2, 2023

## 2023-05-02 NOTE — GROUP NOTE
Psychotherapy Group Note    PATIENT'S NAME: Randi Cleary  MRN:   [id number]  :   1955  ACCT. NUMBER: [id number]  DATE OF SERVICE: 23  START TIME: 10:00 AM  END TIME: 10:50 AM  FACILITATOR: Brittani Molina LICSW  TOPIC: MH EBP Group: Behavioral Activation  Service Modality:  Video Visit     Telemedicine Visit: The patient's condition can be safely assessed and treated via synchronous audio and visual telemedicine encounter.       Reason for Telemedicine Visit: Services only offered telehealth and due to COVID-19.     Originating Site (Patient Location): Patient's home     Distant Site (Provider Location): Provider Remote Setting- Home Office     Consent:  The patient/guardian has verbally consented to: the potential risks and benefits of telemedicine (video visit) versus in person care; bill my insurance or make self-payment for services provided; and responsibility for payment of non-covered services.      Patient would like the video invitation sent by:  My Chart     Mode of Communication:  Video Conference via Medical Zoom     As the provider I attest to compliance with applicable laws and regulations related to telemedicine.     [facility] 55+ Program  TRACK: A1    NUMBER OF PARTICIPANTS: 6    Summary of Group / Topics Discussed:  Behavioral Activation: The Change Process - Behavior Change: Patients explored the process and types of change, including but not limited to, theories of change, steps to making change, methods of changing behavior, and potential barriers.  Patients worked to identify what changes may benefit their daily lives, and work towards a plan to implement change.      Patient Session Goals / Objectives:    Demonstrate understanding of the change process.      Identify positive and negative behavioral patterns.    Make plans to track and implement changes and share experiences in group.    Identify personal barriers to change      Patient Participation /  Response:  Fully participated with the group by sharing personal reflections / insights and openly received / provided feedback with other participants.    Demonstrated understanding of topics discussed through group discussion and participation and Practiced skills in session    Treatment Plan:  Patient has a current master individualized treatment plan.  See Epic treatment plan for more information.    Brittani Molina, SUSANSW

## 2023-05-02 NOTE — TELEPHONE ENCOUNTER
Received call from patient requesting refill(s) of HYDROcodone-acetaminophen (NORCO) 5-325 MG tablet    This was prescribed during virtual visit on 04/11/23. Start date of 05/02/23.  Patient was informed.

## 2023-05-02 NOTE — GROUP NOTE
Psychotherapy Group Note    PATIENT'S NAME: Randi Cleary  MRN:   [id number]  :   1955  ACCT. NUMBER: [id number]  DATE OF SERVICE: 23  START TIME: 11:00 AM  END TIME: 11:50 AM  FACILITATOR: Luh Hare LGSW  TOPIC:  EBP Group: Behavioral Activation  [facility] 55+ Program  TRACK: A1    NUMBER OF PARTICIPANTS: 6    Summary of Group / Topics Discussed:  Behavioral Activation: Motivation and Procrastination: Patients explored how they currently spend their time, identifying thoughts and feelings that are motivating and serve to increase desired behaviors.  They also examined behaviors that contribute to procrastination.  Different types of procrastination behaviors were identified, and strategies to reduce individual procrastination and increase motivation were explored and practiced.  Patients identified ways to increase goal-directed activities to enhance mood and reduce symptoms.        Patient Session Goals / Objectives:    Identify current patterns of procrastination behavior and how they influence thoughts and moods, and inhibit motivation.    Identify behaviors that can be implemented that contribute to improving thoughts and feelings, motivation, and reduce symptoms.    Identify and develop a plan to increase activities that promote a sense of accomplishment and competence.    Practice scheduling positive activities / behaviors into daily routines.                                      Service Modality:  Video Visit     Telemedicine Visit: The patient's condition can be safely assessed and treated via synchronous audio and visual telemedicine encounter.      Reason for Telemedicine Visit: Services only offered telehealth    Originating Site (Patient Location): Patient's home    Distant Site (Provider Location): Provider Remote Setting- Home Office    Consent:  The patient/guardian has verbally consented to: the potential risks and benefits of telemedicine (video visit) versus in  person care; bill my insurance or make self-payment for services provided; and responsibility for payment of non-covered services.     Patient would like the video invitation sent by:  My Chart    Mode of Communication:  Video Conference via Medical Zoom    As the provider I attest to compliance with applicable laws and regulations related to telemedicine.                               Patient Participation / Response:  Fully participated with the group by sharing personal reflections / insights and openly received / provided feedback with other participants.    Demonstrated understanding of topics discussed through group discussion and participation, Expressed understanding of the relationship between behaviors, thoughts, and feelings, Shared experiences and challenges with making behavioral changes, Identified barriers to change, Identified / Expressed personal readiness to make behavioral change and Identified ways to increase goal directed activities    Treatment Plan:  Patient has a current master individualized treatment plan.  See Epic treatment plan for more information.    Luh Hare LGSW

## 2023-05-03 ENCOUNTER — MYC MEDICAL ADVICE (OUTPATIENT)
Dept: INTERNAL MEDICINE | Facility: CLINIC | Age: 68
End: 2023-05-03
Payer: MEDICARE

## 2023-05-03 ENCOUNTER — HOSPITAL ENCOUNTER (OUTPATIENT)
Dept: BEHAVIORAL HEALTH | Facility: CLINIC | Age: 68
Discharge: HOME | End: 2023-05-03
Attending: PSYCHIATRY & NEUROLOGY
Payer: MEDICARE

## 2023-05-03 LAB — A-TUMOR NECROSIS FACT SERPL-MCNC: 8.7 PG/ML

## 2023-05-03 PROCEDURE — 90853 GROUP PSYCHOTHERAPY: CPT | Mod: GT | Performed by: SOCIAL WORKER

## 2023-05-03 PROCEDURE — 90853 GROUP PSYCHOTHERAPY: CPT | Mod: GT

## 2023-05-03 NOTE — GROUP NOTE
Psychotherapy Group Note    PATIENT'S NAME: Randi Cleary  MRN:   [id number]  :   1955  ACCT. NUMBER: [id number]  DATE OF SERVICE: 23  START TIME: 10:00 AM  END TIME: 10:50 AM  FACILITATOR: Brittani Molina LICSW  TOPIC: MH EBP Group: Behavioral Activation  Service Modality:  Video Visit     Telemedicine Visit: The patient's condition can be safely assessed and treated via synchronous audio and visual telemedicine encounter.       Reason for Telemedicine Visit: Services only offered telehealth and due to COVID-19.     Originating Site (Patient Location): Patient's home     Distant Site (Provider Location): Provider Remote Setting- Home Office     Consent:  The patient/guardian has verbally consented to: the potential risks and benefits of telemedicine (video visit) versus in person care; bill my insurance or make self-payment for services provided; and responsibility for payment of non-covered services.      Patient would like the video invitation sent by:  My Chart     Mode of Communication:  Video Conference via Medical Zoom     As the provider I attest to compliance with applicable laws and regulations related to telemedicine.     [facility] 55+ Program  TRACK: A1    NUMBER OF PARTICIPANTS: 6    Summary of Group / Topics Discussed:  Behavioral Activation: Activity Scheduling:Patients explored how they currently spend their time, and how specific behaviors impact thoughts and feelings.  The group explored the effect of negative and positive activities on mood states and thought patterns.  Patients identified activities that help to improve mood and thinking patterns, and developed a plan to implement positive activities between sessions.      Patient Session Goals / Objectives:    Identify impact of current behaviors on thoughts and mood    Identify 2-3 behavioral changes that could have a positive impact on thoughts and mood    Prepare to make desired behavioral change: Create a change  plan / activity schedule      Patient Participation / Response:  Fully participated with the group by sharing personal reflections / insights and openly received / provided feedback with other participants.    Demonstrated understanding of topics discussed through group discussion and participation and Practiced skills in session    Treatment Plan:  Patient has a current master individualized treatment plan.  See Epic treatment plan for more information.    Brittani Molina, LICSW

## 2023-05-03 NOTE — GROUP NOTE
Psychoeducation Group Note    PATIENT'S NAME: Randi Cleary  MRN:   [id number]  :   1955  ACCT. NUMBER: [id number]  DATE OF SERVICE: 23  START TIME: 11:00 AM  END TIME: 11:50 AM  FACILITATOR: Luh Hare LGSW  TOPIC: MH Wellness Group: Mental Health Maintenance  [facility] 55+ Program  TRACK: A1    NUMBER OF PARTICIPANTS: 6    Summary of Group / Topics Discussed:  Mental Health Maintenance:  Letting Go of Stress: Patients reviewed and discussed stress management journal which demonstrated simple proven techniques too quickly and effectively release stress.     Patient Session Goals / Objectives:  ? Patients discovered quick, easy and effective stress reduction techniques  ? Patients practiced techniques that were demonstrated in the stress management journal  ? Patients identified one technique they will use to cope with symptoms                                      Service Modality:  Video Visit     Telemedicine Visit: The patient's condition can be safely assessed and treated via synchronous audio and visual telemedicine encounter.      Reason for Telemedicine Visit: Services only offered telehealth    Originating Site (Patient Location): Patient's home    Distant Site (Provider Location): Provider Remote Setting- Home Office    Consent:  The patient/guardian has verbally consented to: the potential risks and benefits of telemedicine (video visit) versus in person care; bill my insurance or make self-payment for services provided; and responsibility for payment of non-covered services.     Patient would like the video invitation sent by:  My Chart    Mode of Communication:  Video Conference via Medical Zoom    As the provider I attest to compliance with applicable laws and regulations related to telemedicine.                                 Patient Participation / Response:  Fully participated with the group by sharing personal reflections / insights and openly received / provided  feedback with other participants.    Demonstrated understanding of topics discussed through group discussion and participation, Identified / Expressed personal readiness to practice skills and Verbalized understanding of mental health maintenance topic    Treatment Plan:  Patient has a current master individualized treatment plan.  See Epic treatment plan for more information.    Lhu Hare LGSW

## 2023-05-03 NOTE — GROUP NOTE
Process Group Note    PATIENT'S NAME: Randi Cleary  MRN:   [id number]  :   1955  ACCT. NUMBER: [id number]  DATE OF SERVICE: 23  START TIME:  9:00 AM  END TIME:  9:50 AM  FACILITATOR: Brittani Molina [facility]  TOPIC:  Process Group    Diagnoses:   296.33 (F33.2) Major Depressive Disorder, Recurrent Episode, Severe With anxious distress  300.02 (F41.1) Generalized Anxiety Disorder    Rule out:  296.89 Bipolar II Disorder vs. 314.01 (F90.2) Attention-Deficit/Hyperactivity Disorder   Combined presentation         Service Modality:  Video Visit     Telemedicine Visit: The patient's condition can be safely assessed and treated via synchronous audio and visual telemedicine encounter.       Reason for Telemedicine Visit: Services only offered telehealth and due to COVID-19.     Originating Site (Patient Location): Patient's home     Distant Site (Provider Location): Provider Remote Setting- Home Office     Consent:  The patient/guardian has verbally consented to: the potential risks and benefits of telemedicine (video visit) versus in person care; bill my insurance or make self-payment for services provided; and responsibility for payment of non-covered services.      Patient would like the video invitation sent by:  My Chart     Mode of Communication:  Video Conference via Medical Zoom     As the provider I attest to compliance with applicable laws and regulations related to telemedicine.    [facility] 55+ Program  TRACK: A1    NUMBER OF PARTICIPANTS: 5          Data:    Session content: At the start of this group, patients were invited to check in by identifying themselves, describing their current emotional status, and identifying issues to address in this group.   Area(s) of treatment focus addressed in this session included Symptom Management, Personal Safety, Community Resources/Discharge Planning, Abstinence/Relapse Prevention, Develop / Improve Independent Living Skills, Develop  Socialization / Interpersonal Relationship Skills and Physical Health.    Winnie reports anxious and less depressed mood. Denies S/I or safety issues. Winnie processed coping with co-morbid physical health issues. She is awaiting to consult with her MD about her lab work. She saw results on the Il-6 which indicated inflammation. She was able to obtain a Rheumotolgy appointment with Dr. Harris at [facility] in June.  Winnie processed her transition from the group. She has a relapse prevention plan with community resources and appointments. Please see discharge summary for providers and appointments.      Therapeutic Interventions/Treatment Strategies:  Psychotherapist offered support, feedback and validation and reinforced use of skills. Treatment modalities used include Cognitive Behavioral Therapy.    Assessment:    Patient response:   Patient responded to session by accepting feedback, giving feedback, listening, focusing on goals, being attentive, accepting support and verbalizing understanding    Possible barriers to participation / learning include: and no barriers identified    Health Issues:   Yes: Pain, Associated Psychological Distress  Chronic disease management, Associated Psychological Distress       Substance Use Review:   Substance Use: No active concerns identified.      Urges to drink alcohol but has not engaged in use. Maintaining sobriety.     Mental Status/Behavioral Observations  Appearance:   Appropriate   Eye Contact:   Good   Psychomotor Behavior: Normal   Attitude:   Cooperative  Interested  Orientation:   All  Speech   Rate / Production: Normal    Volume:  Normal   Mood:    Anxious  Less depressed mood  Affect:    Appropriate   Thought Content:   Clear and Safety denies any current safety concerns including suicidal ideation, self-harm, and homicidal ideation  Thought Form:  Coherent  Goal Directed  Logical     Insight:    Good     Plan:     Safety Plan: No current safety concerns  identified.  Recommended that patient call 911 or go to the local ED should there be a change in any of these risk factors.     Barriers to treatment: None identified    Patient Contracts (see media tab):  None    Substance Use: Not addressed in session     Continue or Discharge: Patient will continue in 55+ Program (55+) as planned. Patient is likely to benefit from learning and using skills as they work toward the goals identified in their treatment plan. Tentative discharge for 5/5/2023. [facility] to coordinate start for 55+ Aftercare Clinic for 5/11/2023.      Brittani Molina, [facility]  May 3, 2023

## 2023-05-03 NOTE — PROGRESS NOTES
LOCUS Worksheet     Name: Randi Cleary MRN: [id number]    : 1955      Gender:  female    [facility]   Provider NPI:  [id number]    Actual level of Care Provided: IOP    Service(s) receiving or referred to:  OP 55+ Aftercare Clinic      Rating completed by: LIZBETH Prasad, ASHKAN      I. Risk of Harm:   2      Low Risk of Harm    II. Functional Status:   3      Moderate Impairment    III. Co-Morbidity:   3      Significant Co-Morbidity    IV - A. Recovery Environment - Level of Stress:   2      Mildly Stressful Environment    IV - B. Recovery Environment - Level of Support:   1      Highly Supportive Environment    V. Treatment and Recovery History:   1      Fully Responsive to Treatment and Recovery Management    VI. Engagement and Recovery Project:   1      Optimal Engagement and Recovery       13 Composite Score    Level of Care Recommendation:   10 to 13       Recovery Maintenance Health Maintenance

## 2023-05-04 NOTE — TELEPHONE ENCOUNTER
----- Message from ASHKAN Rivera sent at 5/3/2023  4:10 PM CDT -----  Regarding: Scheduling request  Patient Name:  Winnie Cleary   Location of program[facility]care [facility]   Start Date: May / 11 / 2023   Group: 55+ Aftercare Clinic meets on -01:00 PM to 3:00 PM   On May 11 visit will be virtual telemedicine and starting May 18 clinic will transition to in person.  Attending Provider (MD): Dr. Das   Number of visits to be scheduled: 12   Duration of Appointment in minutes: 100   Visit Type: Zoom - [id number]     Additional notes: Please cancel appointments on A-1 ALYSSA beyond 2023 for pt had his last appointment in A1 track of IOP on May 5.     Please confirm that she has health insurance coverage for the Clinic, thank you!

## 2023-05-05 ENCOUNTER — HOSPITAL ENCOUNTER (OUTPATIENT)
Dept: BEHAVIORAL HEALTH | Facility: CLINIC | Age: 68
Discharge: HOME | End: 2023-05-05
Attending: PSYCHIATRY & NEUROLOGY
Payer: MEDICARE

## 2023-05-05 PROCEDURE — 90853 GROUP PSYCHOTHERAPY: CPT | Mod: PO

## 2023-05-05 NOTE — GROUP NOTE
Psychoeducation Group Note    PATIENT'S NAME: Randi Cleary  MRN:   [id number]  :   1955  ACCT. NUMBER: [id number]  DATE OF SERVICE: 23  START TIME: 11:00 AM  END TIME: 11:50 AM  FACILITATOR: Luh Hare LGSW  TOPIC: MH Wellness Group: Health Maintenance  [facility] 55+ Program  TRACK: A1    NUMBER OF PARTICIPANTS: 5    Summary of Group / Topics Discussed:  Health Maintenance: Wellness Check-in: Patients met with group facilitator to individually review a holistic wellness check-in to assess patient medication adherence/concerns, appointments, physical and mental health, exercise, nutrition, sleep, socialization, substance use, and need for service/resource referrals.       Patient Session Goals / Objectives:    Discussed various aspects of health management and self-care related to physical and mental health    Demonstrated increased self-awareness of current wellness needs    Developed health literacy skills in navigating the healthcare system and self-advocacy/communicating needs with health care team                                      Service Modality:  Video Visit     Telemedicine Visit: The patient's condition can be safely assessed and treated via synchronous audio and visual telemedicine encounter.      Reason for Telemedicine Visit: Services only offered telehealth    Originating Site (Patient Location): Patient's home    Distant Site (Provider Location): Provider Remote Setting- Home Office    Consent:  The patient/guardian has verbally consented to: the potential risks and benefits of telemedicine (video visit) versus in person care; bill my insurance or make self-payment for services provided; and responsibility for payment of non-covered services.     Patient would like the video invitation sent by:  My Chart    Mode of Communication:  Video Conference via Medical Zoom    As the provider I attest to compliance with applicable laws and regulations related to telemedicine.                                Patient Participation / Response:  Fully participated with the group by sharing personal reflections / insights and openly received / provided feedback with other participants.    Demonstrated understanding of topics discussed through group discussion and participation, Identified / Expressed personal readiness to practice skills and Verbalized understanding of health maintenance topic    Treatment Plan:  Patient has See Epic Treatment Plan - Patient is discharging.    Luh Hare LGSW

## 2023-05-05 NOTE — GROUP NOTE
Process Group Note    PATIENT'S NAME: Randi Cleary  MRN:   [id number]  :   1955  ACCT. NUMBER: [id number]  DATE OF SERVICE: 23  START TIME:  9:00 AM  END TIME: 10:00 AM  FACILITATOR: Sheeba Velez LMFT  TOPIC:  Process Group    Diagnoses:  296.33 (F33.2) Major Depressive Disorder, Recurrent Episode, Severe With anxious distress  4. Other Diagnoses that is relevant to services:   300.02 (F41.1) Generalized Anxiety Disorder    5. Provisional Diagnosis:  296.89 Bipolar II Disorder vs. 314.01 (F90.2) Attention-Deficit/Hyperactivity Disorder   Combined presentation       [facility]  TRACK: A1    NUMBER OF PARTICIPANTS: 5                                      Service Modality:  Video Visit     Telemedicine Visit: The patient's condition can be safely assessed and treated via synchronous audio and visual telemedicine encounter.      Reason for Telemedicine Visit: Patient has requested telehealth visit    Originating Site (Patient Location): Patient's home    Distant Site (Provider Location): Provider Remote Setting- Home Office    Consent:  The patient/guardian has verbally consented to: the potential risks and benefits of telemedicine (video visit) versus in person care; bill my insurance or make self-payment for services provided; and responsibility for payment of non-covered services.     Patient would like the video invitation sent by:  My Chart    Mode of Communication:  Video Conference via Medical Zoom    As the provider I attest to compliance with applicable laws and regulations related to telemedicine.                                  Data:    Session content: At the start of this group, patients were invited to check in by identifying themselves, describing their current emotional status, and identifying issues to address in this group.   Area(s) of treatment focus addressed in this session included Symptom Management, Personal Safety, Develop /  Improve Independent Living Skills and Develop Socialization / Interpersonal Relationship Skills.    Patient reported feeling better today following having anxiety and panic before group. Patient stated she has not gotten responses from tests which has been anxiety producing. Sofia also reported feeling hopeful today. Patient stated her goal for today is to continue downloading handouts from her email. Patient also reported needing to go to the farmer s market for eggs. Patient also stated she is working on trying to get into her room to have a personal space. Patient denied safety concerns. Patient reported feeling fearful of going there and is not out of that yet. Patient denied chemical use. Patient reported feeling grateful for her cat.       Therapeutic Interventions/Treatment Strategies:  Psychotherapist offered support, feedback and validation and reinforced use of skills. Treatment modalities used include Motivational Interviewing, Cognitive Behavioral Therapy and Dialectical Behavioral Therapy. Interventions include Emotions Management:  Reinforced the purpose and biological basis of emotions, Discussed barriers to emotional regulation and Increased awareness of daily mood patterns/changes and Relationship Skills: Assisted patients in implementing more effective communication skills in their relationships and Discussed strategies to promote healthier understanding of interpersonal relationships.    Assessment:    Patient response:   Patient responded to session by accepting feedback, giving feedback, listening, focusing on goals, being attentive and accepting support    Possible barriers to participation / learning include: and no barriers identified    Health Issues:   Yes: Pain, Associated Psychological Distress       Substance Use Review:   Substance Use: No active concerns identified.    Mental Status/Behavioral Observations  Appearance:   Appropriate   Eye Contact:   Good   Psychomotor  Behavior: Normal   Attitude:   Cooperative   Orientation:   All  Speech   Rate / Production: Emotional   Volume:  Normal   Mood:    Anxious  Sad   Affect:    Tearful  Thought Content:   Rumination  Thought Form:  Coherent  Logical     Insight:    Good     Plan:     Safety Plan: No current safety concerns identified.  Recommended that patient call 911 or go to the local ED should there be a change in any of these risk factors.     Barriers to treatment: None identified    Patient Contracts (see media tab):  None    Substance Use: Not addressed in session     Continue or Discharge: Patient will continue in 55+ Program (55+) as planned. Patient is likely to benefit from learning and using skills as they work toward the goals identified in their treatment plan.      Sheeba Velez, [facility]  May 5, 2023

## 2023-05-05 NOTE — GROUP NOTE
Psychotherapy Group Note    PATIENT'S NAME: Randi Cleary  MRN:   [id number]  :   1955  ACCT. NUMBER: [id number]  DATE OF SERVICE: 23  START TIME: 10:00 AM  END TIME: 10:50 AM  FACILITATOR: Sheeba Velez LMFT  TOPIC: MH EBP Group: Coping Skills  [facility] Adult Mental Health Day Treatment  TRACK: A1    NUMBER OF PARTICIPANTS: 5                                      Service Modality:  Video Visit     Telemedicine Visit: The patient's condition can be safely assessed and treated via synchronous audio and visual telemedicine encounter.      Reason for Telemedicine Visit: Patient has requested telehealth visit    Originating Site (Patient Location): Patient's home    Distant Site (Provider Location): Provider Remote Setting- Home Office    Consent:  The patient/guardian has verbally consented to: the potential risks and benefits of telemedicine (video visit) versus in person care; bill my insurance or make self-payment for services provided; and responsibility for payment of non-covered services.     Patient would like the video invitation sent by:  My Chart    Mode of Communication:  Video Conference via Medical Zoom    As the provider I attest to compliance with applicable laws and regulations related to telemedicine.              Summary of Group / Topics Discussed:  Coping Skills: Additional Coping Skills:  Patients discussed and practiced coping ahead and building mastery.  Reviewed the benefits of applying the aforementioned coping strategies.  Patients explored how these strategies might be applied to daily stressors or distressing situations.    Patient Session Goals / Objectives:    Understand the purpose and benefits of applying emotion regulation coping strategies    Applied the use of cope ahead to current life situations    Address barriers to utilizing coping skills when in distress.        Patient Participation / Response:  Fully participated with the group by sharing  personal reflections / insights and openly received / provided feedback with other participants.    Demonstrated understanding of topics discussed through group discussion and participation, Expressed understanding of the relevance / importance of coping skills at distressing times in life, Demonstrated knowledge of when to consider using a variety of coping skills in daily life, Identified barriers to applying coping skills and Committed to using the following techniques in times of distress: cope ahead    Treatment Plan:  Patient has a current master individualized treatment plan.  See Epic treatment plan for more information.    Sheeba Velez, XOCHITLFT

## 2023-05-08 ENCOUNTER — TELEPHONE (OUTPATIENT)
Dept: NEUROSURGERY | Facility: CLINIC | Age: 68
End: 2023-05-08
Payer: MEDICARE

## 2023-05-08 NOTE — TELEPHONE ENCOUNTER
----- Message from ROSY Garcia sent at 5/8/2023  8:50 AM CDT -----  Regarding: Correction on [facility] Start Date  Scheduling Request    Patient Name: Winnie Cleary.  Location of programming: [facility]  Start Date: May / 18 / 2023  Group: [facility] on Thursday at 1:00 PM to 3:00 PM  Attending Provider (MD): Dr. Roland Das  Number of visits to be scheduled: 12  Duration of Appointment in minutes: 120  Visit Type: In Person 870    Additional notes: Will now be starting 5/18. This is a change from their original start date of 5/11    ROSY Garcia

## 2023-05-08 NOTE — TELEPHONE ENCOUNTER
[facility]    Phone Message    May a detailed message be left on voicemail: yes     Reason for Call: Requesting Results   Name/type of test: MR CERVICAL SPINE WO  Date of test: 5/1/23  Was test done at a location other than [facility] (Please fill in the location if not [facility])?: No    Winnie calling to request a call back to discuss the results of her MRI and next steps in her care.    Action Taken: Message routed to:  [facility] ([facility]    Travel Screening: Not Applicable

## 2023-05-09 ENCOUNTER — VIRTUAL VISIT (OUTPATIENT)
Dept: PSYCHOLOGY | Facility: CLINIC | Age: 68
End: 2023-05-09
Payer: MEDICARE

## 2023-05-09 DIAGNOSIS — F43.9 TRAUMA AND STRESSOR-RELATED DISORDER: ICD-10-CM

## 2023-05-09 DIAGNOSIS — F31.81 BIPOLAR 2 DISORDER (H): Primary | ICD-10-CM

## 2023-05-09 DIAGNOSIS — F41.1 GENERALIZED ANXIETY DISORDER: ICD-10-CM

## 2023-05-09 PROCEDURE — 90837 PSYTX W PT 60 MINUTES: CPT | Mod: VID | Performed by: SOCIAL WORKER

## 2023-05-09 NOTE — PROGRESS NOTES
[facility]                                     Progress Note    Patient Name: Randi Cleary  Date: 5/9/23         Service Type: Individual     Session Start Time: 12:00 PM  Session End Time: 12:53 PM     Session Length: 53 minutes    Session #: 175    Attendees: Client attended alone    Service Modality:  Video Visit:      Provider verified identity through the following two step process.  Patient provided:  Patient is known previously to provider    Telemedicine Visit: The patient's condition can be safely assessed and treated via synchronous audio and visual telemedicine encounter.      Reason for Telemedicine Visit: Patient convenience (e.g. access to timely appointments / distance to available provider)    Originating Site (Patient Location): Patient's home    Distant Site (Provider Location): Provider Remote Setting- Home Office    Consent:  The patient/guardian has verbally consented to: the potential risks and benefits of telemedicine (video visit) versus in person care; bill my insurance or make self-payment for services provided; and responsibility for payment of non-covered services.     Patient would like the video invitation sent by:  My Chart    Mode of Communication:  [facility]    Distant Location (Provider):  Off-site    As the provider I attest to compliance with applicable laws and regulations related to telemedicine.      DATA  Extended Session (53+ minutes): PROLONGED SERVICE IN THE OUTPATIENT SETTING REQUIRING DIRECT (FACE-TO-FACE) PATIENT CONTACT BEYOND THE USUAL SERVICE:    - Patient's presenting concerns require more intensive intervention than could be completed within the usual service  Interactive Complexity: No  Crisis: No        Progress Since Last Session (Related to Symptoms / Goals / Homework):   Symptoms: Some stability in her mood right now, some interpersonal challanges navigating her health concerns    Homework: Progress made  Work on  environment  Get back to the pool    In the future:  Look at a budget  Get back to the pool  Talk to your primary care doctor about what's on your list (mood, neck, arthritis)         Episode of Care Goals: Satisfactory progress - ACTION (Actively working towards change); Intervened by reinforcing change plan / affirming steps taken     Current / Ongoing Stressors and Concerns:   Patient is currently socially isolated. She has a conflictual relationship with her .  She is getting minimal physical activity.  She has had several surgeries.      Treatment Objective(s) Addressed in This Session:   Patient will increase frequency of engaging her in ADLs.  Patient will track and record at least 5 pleasant exchanges with . Patient will be able to identify at least 5 positive traits about her .  Patient will reduce level of depressive and anxious features as evidenced by reduction in score on her CHAVO-7 and PHQ-9 (scores of 15 and 16 at first measurment, respectively).     Intervention:   Supportive Therapy: Processed patient's recent experience with navigating the healthcare system and getting her needs met.  Talked about how to continue to get herself the support that she needs in time.  Helped patient prioritize what appointments that she would like to follow up on first.  Discussed changes with 55+ group and how she is getting support she needs from it.        The following assessments were completed by patient for this visit:  PHQ9:       2/27/2023    10:44 AM 3/13/2023     8:02 AM 3/20/2023    10:35 AM 4/7/2023    12:25 PM 4/25/2023    12:02 PM 4/28/2023    12:33 PM 5/5/2023     8:09 AM   PHQ-9 SCORE   PHQ-9 Total Score MyChart 20 (Severe depression) 16 (Moderately severe depression) 15 (Moderately severe depression) 14 (Moderate depression) 11 (Moderate depression) 12 (Moderate depression) 15 (Moderately severe depression)   PHQ-9 Total Score 20 16 15 14 11 12 15     GAD7:       2/5/2023     9:49  PM 2/20/2023    10:48 AM 3/9/2023     9:27 AM 3/24/2023     9:06 AM 4/7/2023    12:26 PM 4/25/2023    12:02 PM 5/5/2023     8:07 AM   CHAVO-7 SCORE   Total Score 16 (severe anxiety) 17 (severe anxiety) 15 (severe anxiety) 15 (severe anxiety) 10 (moderate anxiety) 10 (moderate anxiety) 11 (moderate anxiety)   Total Score 16 17    17    17    17    17 15    15    15 15    15    15    15 10 10    10 11     PROMIS 10-Global Health (only subscores and total score):       1/5/2023    11:28 AM 2/5/2023     9:48 PM 3/9/2023     9:30 AM 4/7/2023    12:28 PM 4/19/2023     8:26 AM 4/25/2023    12:04 PM 5/5/2023     8:12 AM   PROMIS-10 Scores Only   Global Mental Health Score 5    5        5 5    5    5    5    5 6    6    6 7    7    7    7 8    8    8 7    7 6   Global Physical Health Score 8    8        8 8    8    8    8    8 6    6    6 8    8    8    8 7    7    7 10    10 9   PROMIS TOTAL - SUBSCORES 13    13        13 13    13    13    13    13 12    12    12 15    15    15    15 15    15    15 17    17 15       Information is confidential and restricted. Go to Review Flowsheets to unlock data.    Multiple values from one day are sorted in reverse-chronological order        ASSESSMENT: Current Emotional / Mental Status (status of significant symptoms):   Risk status (Self / Other harm or suicidal ideation)   Patient denies current fears or concerns for personal safety.   Patient denies current or recent suicidal ideation or behaviors.   Patient denies current or recent homicidal ideation or behaviors.   Patient denies current or recent self injurious behavior or ideation.   Patient denies other safety concerns.   Patient reports there has been no change in risk factors since their last session.     Patient reports there has been no change in protective factors since their last session.     A safety and risk management plan has been developed including: Patient consented to co-developed safety plan on 11/24/2020, updated  2/20/23.  Safety and risk management plan was reviewed.   Patient agreed to use safety plan should any safety concerns arise.  A copy was made available to the patient.     Appearance:   Appropriate    Eye Contact:   Good    Psychomotor Behavior: Normal    Attitude:   Cooperative    Orientation:   All   Speech    Rate / Production: Normal/ Responsive    Volume:  Normal    Mood:    Normal   Affect:    Appropriate    Thought Content:  Clear    Thought Form:  Coherent    Insight:    Good      Medication Review:   No changes to current psychiatric medication(s)     Medication Compliance:   Yes     Changes in Health Issues:   None reported     Chemical Use Review:   Substance Use: Chemical use reviewed, no active concerns identified Nothing used since August 2021.     Tobacco Use: No current tobacco use.      Diagnosis:  1. Bipolar 2 disorder (H)    2. Generalized anxiety disorder    3. Trauma and stressor-related disorder      Collateral Reports Completed:   Not Applicable    PLAN: (Patient Tasks / Therapist Tasks / Other)  Work on environment  Get back to the pool    In the future:  Look at a budget  Get back to the pool  Talk to your primary care doctor about what's on your list (mood, neck, arthritis)        There has been demonstrated improvement in functioning while patient has been engaged in psychotherapy/psychological service- if withdrawn the patient would deteriorate and/or relapse.     MICAELA SLADE, [facility]   May 9, 2023                                                        ______________________________________________________________________    Individual Treatment Plan    Patient's Name: Randi Cleary  YOB: 1955    Date of Creation: 6/30/20  Date Treatment Plan Last Reviewed/Revised: 3/2/23    DSM5 Diagnoses: 296.89 Bipolar II Disorder Depressed, 300.02 (F41.1) Generalized Anxiety Disorder or Adjustment Disorders  309.89 (F43.8) Other Specified Trauma and Stressor Related  "Disorder  Psychosocial / Contextual Factors: Patient's entire family of origin has , she now has a sister-in-law and  as support.  Relationship with  is conflictual. She is recovering from surgeries  PROMIS (reviewed every 90 days): PROMIS-10 Scores  PROMIS 10-Global Health (only subscores and total score):   PROMIS-10 Scores Only 2021 3/15/2022 3/15/2022 3/15/2022 3/24/2022 2022 2022   Global Mental Health Score 6 6 6 6 8 12 12   Global Physical Health Score 9 9 9 9 8 11 10   PROMIS TOTAL - SUBSCORES 15 15 15 15 16 23 22       Referral / Collaboration:  Referral to another professional/service is not indicated at this time..    Anticipated number of session for this episode of care: 50  Anticipation frequency of session: Biweekly  Anticipated Duration of each session: 53 or more minutes due to intensity of trauma symptoms  Treatment plan will be reviewed in 90 days or when goals have been changed.   There has been demonstrated improvement in functioning while patient has been engaged in psychotherapy/psychological service- if withdrawn the patient would deteriorate and/or relapse.       MeasurableTreatment Goal(s) related to diagnosis / functional impairment(s)  Goal 1: Patient will \"jumpstart, getting going with the things I need to be doing around the house as far as picking up, doing things, trying to do something every day.  Also to lessen the animosity between me and my .\"    I will know I've met my goal when my shoulders are fixed and I can see.      Objective #A (Patient Action)    Patient will increase frequency of engaging her in ADLs.  Status: Continued - Date(s): 12/10/21, 3/9/22, 22, 22, 22, 3/2/23    Intervention(s)  Therapist will engage patient in CBT, specifically behavioral activation.    Objective #B  Patient will  track and record at least 5 pleasant exchanges with . Patient will be able to identify at least 5 positive traits about her " " and how he relates to her.  Status: Continued - Date(s): 12/10/21, 3/9/22, 6/9/22, 9/2/22, 12/1/22, 3/2/23    Intervention(s)  Therapist will teach assertiveness skills and assign homework related to relationship interactions.    Objective #C  Patient will reduce level of depressive and anxious features as evidenced by reduction in score on her CHAVO-7 and PHQ-9 (scores of 15 and 16 at first measurment, respectively).  Status: Continued - Date(s): 12/10/21, 3/9/22, 6/9/22, 9/2/22, 12/1/22, 3/2/23    Intervention(s)  Therapist will engage patient in person-centered therapy and CBT.    Patient has reviewed and agreed to the above plan.      MICAELA SLADE, [facility]  March 2, 2023                                                   Randi Cleary          SAFETY PLAN:  Step 1: Warning signs / cues (Thoughts, images, mood, situation, behavior) that a crisis may be developing:  ? Thoughts: \"I don't want to continue\" \"I am unwanted\"  ? Images: none  ? Thinking Processes: ruminating  ? Mood: anger  ? Behaviors: isolating/withdrawing , can't stop crying, not taking care of myself and not taking care of my responsibilities  ? Situations: small triggers, such as not being able to find something, or dropping something   Step 2: Coping strategies - Things I can do to take my mind off of my problems without contacting another person (relaxation technique, physical activity):  ? Distress Tolerance Strategies:  arts and crafts: drawing, play with my pet , listen to positive and upbeat music: any, change body temperature (ice pack/cold water)  and paced breathing/progressive muscle relaxation  ? Physical Activities: go for a walk, deep breathing and stretching   ? Focus on helpful thoughts:  \"You've been through this before, you can get through it again.\"  Step 3: People and social settings that provide " distraction:                 Name: Carmen                            Name: Darien                           Name: Aleida       ? pool, shopping, Carmen's house, [employer]       Step 4: Remind myself of people and things that are important to me and worth living for:  Clifford Little Donna, post-COVID world, options of what could be in your future        Step 5: When I am in crisis, I can ask these people to help me use my safety plan:                 Name: Sidney  Step 6: Making the environment safe:   ? go to sleep/daydream  Step 7: Professionals or agencies I can contact during a crisis:  ? [facility] Daytime Number: [phone]  ? Suicide Prevention Lifeline: 7-127-124-XXGD (6321)  ? Crisis Text Line Service (available 24 hours a day, 7 days a week): Text [place] to 469187    Local Crisis Services: [facility] Crisis: [phone]  Adults can always access to the emPATH unit at [facility] (no phone number, utilize it like an urgent care or ER where you just show up)     Call 911 or go to my nearest emergency department.       I helped develop this safety plan and agree to use it when needed.  I have been given a copy of this plan.       Client signature _________________________________________________________________  Today s date:  11/24/2020  Adapted from Safety Plan Template 2008 Randi Poole and Robby Barba is reprinted with the express permission of the authors.  No portion of the Safety Plan Template may be reproduced without the express, written permission.  You can contact the authors at [email or web address] or [email or web address].

## 2023-05-11 NOTE — TELEPHONE ENCOUNTER
Writer sent MyChart to patient regarding imaging, follow up and injections.     Carina Cunningham LPN  Neurosurgery

## 2023-05-12 ENCOUNTER — TRANSFERRED RECORDS (OUTPATIENT)
Dept: HEALTH INFORMATION MANAGEMENT | Facility: CLINIC | Age: 68
End: 2023-05-12
Payer: MEDICARE

## 2023-05-16 ENCOUNTER — VIRTUAL VISIT (OUTPATIENT)
Dept: PSYCHOLOGY | Facility: CLINIC | Age: 68
End: 2023-05-16
Payer: MEDICARE

## 2023-05-16 DIAGNOSIS — F43.9 TRAUMA AND STRESSOR-RELATED DISORDER: ICD-10-CM

## 2023-05-16 DIAGNOSIS — F41.1 GENERALIZED ANXIETY DISORDER: ICD-10-CM

## 2023-05-16 DIAGNOSIS — F31.81 BIPOLAR 2 DISORDER (H): Primary | ICD-10-CM

## 2023-05-16 PROCEDURE — 90837 PSYTX W PT 60 MINUTES: CPT | Mod: VID | Performed by: SOCIAL WORKER

## 2023-05-16 ASSESSMENT — PATIENT HEALTH QUESTIONNAIRE - PHQ9
SUM OF ALL RESPONSES TO PHQ QUESTIONS 1-9: 14
10. IF YOU CHECKED OFF ANY PROBLEMS, HOW DIFFICULT HAVE THESE PROBLEMS MADE IT FOR YOU TO DO YOUR WORK, TAKE CARE OF THINGS AT HOME, OR GET ALONG WITH OTHER PEOPLE: EXTREMELY DIFFICULT
SUM OF ALL RESPONSES TO PHQ QUESTIONS 1-9: 14

## 2023-05-16 NOTE — PROGRESS NOTES
[facility]                                     Progress Note    Patient Name: Randi Cleary  Date: 5/16/23         Service Type: Individual     Session Start Time: 9:05 AM  Session End Time: 9:58 AM     Session Length: 53 minutes    Session #: 176    Attendees: Client attended alone    Service Modality:  Video Visit:      Provider verified identity through the following two step process.  Patient provided:  Patient is known previously to provider    Telemedicine Visit: The patient's condition can be safely assessed and treated via synchronous audio and visual telemedicine encounter.      Reason for Telemedicine Visit: Patient convenience (e.g. access to timely appointments / distance to available provider)    Originating Site (Patient Location): Patient's home    Distant Site (Provider Location): Provider Remote Setting- Home Office    Consent:  The patient/guardian has verbally consented to: the potential risks and benefits of telemedicine (video visit) versus in person care; bill my insurance or make self-payment for services provided; and responsibility for payment of non-covered services.     Patient would like the video invitation sent by:  My Chart    Mode of Communication:  [facility] Conference via [facility]    Distant Location (Provider):  Off-site    As the provider I attest to compliance with applicable laws and regulations related to telemedicine.      DATA  Extended Session (53+ minutes): PROLONGED SERVICE IN THE OUTPATIENT SETTING REQUIRING DIRECT (FACE-TO-FACE) PATIENT CONTACT BEYOND THE USUAL SERVICE:    - Patient's presenting concerns require more intensive intervention than could be completed within the usual service  Interactive Complexity: No  Crisis: No        Progress Since Last Session (Related to Symptoms / Goals / Homework):   Symptoms: No change since prior session, but talking with more hope of getting help with her medical concerns    Homework: Progress made  Work on  environment  Get back to the pool    In the future:  Look at a budget  Get back to the pool  Talk to your primary care doctor about what's on your list (mood, neck, arthritis)       Episode of Care Goals: Satisfactory progress - ACTION (Actively working towards change); Intervened by reinforcing change plan / affirming steps taken     Current / Ongoing Stressors and Concerns:   Patient is currently socially isolated. She has a conflictual relationship with her .  She is getting minimal physical activity.  She has had several surgeries.      Treatment Objective(s) Addressed in This Session:   Patient will increase frequency of engaging her in ADLs.  Patient will track and record at least 5 pleasant exchanges with . Patient will be able to identify at least 5 positive traits about her .  Patient will reduce level of depressive and anxious features as evidenced by reduction in score on her CHAVO-7 and PHQ-9 (scores of 15 and 16 at first measurment, respectively).     Intervention:   Supportive Therapy:  Processed patient's ongoing medical concerns and how she's navigating it. Talked about next steps and caring for herself in the process. Reviewed social connections and how she feels about them.        The following assessments were completed by patient for this visit:  PHQ9:       3/13/2023     8:02 AM 3/20/2023    10:35 AM 4/7/2023    12:25 PM 4/25/2023    12:02 PM 4/28/2023    12:33 PM 5/5/2023     8:09 AM 5/16/2023     8:39 AM   PHQ-9 SCORE   PHQ-9 Total Score MyChart 16 (Moderately severe depression) 15 (Moderately severe depression) 14 (Moderate depression) 11 (Moderate depression) 12 (Moderate depression) 15 (Moderately severe depression) 14 (Moderate depression)   PHQ-9 Total Score 16 15 14 11 12 15 14     GAD7:       2/5/2023     9:49 PM 2/20/2023    10:48 AM 3/9/2023     9:27 AM 3/24/2023     9:06 AM 4/7/2023    12:26 PM 4/25/2023    12:02 PM 5/5/2023     8:07 AM   CHAVO-7 SCORE   Total Score  16 (severe anxiety) 17 (severe anxiety) 15 (severe anxiety) 15 (severe anxiety) 10 (moderate anxiety) 10 (moderate anxiety) 11 (moderate anxiety)   Total Score 16 17    17    17    17    17 15    15    15 15    15    15    15 10 10    10 11     PROMIS 10-Global Health (only subscores and total score):       1/5/2023    11:28 AM 2/5/2023     9:48 PM 3/9/2023     9:30 AM 4/7/2023    12:28 PM 4/19/2023     8:26 AM 4/25/2023    12:04 PM 5/5/2023     8:12 AM   PROMIS-10 Scores Only   Global Mental Health Score 5    5        5 5    5    5    5    5 6    6    6 7    7    7    7 8    8    8 7    7 6   Global Physical Health Score 8    8        8 8    8    8    8    8 6    6    6 8    8    8    8 7    7    7 10    10 9   PROMIS TOTAL - SUBSCORES 13    13        13 13    13    13    13    13 12    12    12 15    15    15    15 15    15    15 17    17 15       Information is confidential and restricted. Go to Review Flowsheets to unlock data.    Multiple values from one day are sorted in reverse-chronological order        ASSESSMENT: Current Emotional / Mental Status (status of significant symptoms):   Risk status (Self / Other harm or suicidal ideation)   Patient denies current fears or concerns for personal safety.   Patient denies current or recent suicidal ideation or behaviors.   Patient denies current or recent homicidal ideation or behaviors.   Patient denies current or recent self injurious behavior or ideation.   Patient denies other safety concerns.   Patient reports there has been no change in risk factors since their last session.     Patient reports there has been no change in protective factors since their last session.     A safety and risk management plan has been developed including: Patient consented to co-developed safety plan on 11/24/2020, updated 2/20/23.  Safety and risk management plan was reviewed.   Patient agreed to use safety plan should any safety concerns arise.  A copy was made available to the  patient.     Appearance:   Appropriate    Eye Contact:   Good    Psychomotor Behavior: Normal    Attitude:   Cooperative    Orientation:   All   Speech    Rate / Production: Normal/ Responsive    Volume:  Normal    Mood:    Normal   Affect:    Appropriate    Thought Content:  Clear    Thought Form:  Coherent    Insight:    Good      Medication Review:   No changes to current psychiatric medication(s)     Medication Compliance:   Yes     Changes in Health Issues:   None reported     Chemical Use Review:   Substance Use: Chemical use reviewed, no active concerns identified Nothing used since 2021.     Tobacco Use: No current tobacco use.      Diagnosis:  1. Bipolar 2 disorder (H)    2. Generalized anxiety disorder    3. Trauma and stressor-related disorder      Collateral Reports Completed:   Not Applicable    PLAN: (Patient Tasks / Therapist Tasks / Other)  Work on environment  Get back to the pool    In the future:  Look at a budget  Get back to the pool  Talk to your primary care doctor about what's on your list (mood, neck, arthritis)        There has been demonstrated improvement in functioning while patient has been engaged in psychotherapy/psychological service- if withdrawn the patient would deteriorate and/or relapse.     MICAELA SLADE, [facility]   May 16, 2023                                                        ______________________________________________________________________    Individual Treatment Plan    Patient's Name: Randi Cleary  YOB: 1955    Date of Creation: 20  Date Treatment Plan Last Reviewed/Revised: 3/2/23    DSM5 Diagnoses: 296.89 Bipolar II Disorder Depressed, 300.02 (F41.1) Generalized Anxiety Disorder or Adjustment Disorders  309.89 (F43.8) Other Specified Trauma and Stressor Related Disorder  Psychosocial / Contextual Factors: Patient's entire family of origin has , she now has a sister-in-law and  as support.  Relationship with   "is conflictual. She is recovering from surgeries  PROMIS (reviewed every 90 days): PROMIS-10 Scores  PROMIS 10-Global Health (only subscores and total score):   PROMIS-10 Scores Only 12/14/2021 3/15/2022 3/15/2022 3/15/2022 3/24/2022 4/1/2022 6/9/2022   Global Mental Health Score 6 6 6 6 8 12 12   Global Physical Health Score 9 9 9 9 8 11 10   PROMIS TOTAL - SUBSCORES 15 15 15 15 16 23 22       Referral / Collaboration:  Referral to another professional/service is not indicated at this time..    Anticipated number of session for this episode of care: 50  Anticipation frequency of session: Biweekly  Anticipated Duration of each session: 53 or more minutes due to intensity of trauma symptoms  Treatment plan will be reviewed in 90 days or when goals have been changed.   There has been demonstrated improvement in functioning while patient has been engaged in psychotherapy/psychological service- if withdrawn the patient would deteriorate and/or relapse.       MeasurableTreatment Goal(s) related to diagnosis / functional impairment(s)  Goal 1: Patient will \"jumpstart, getting going with the things I need to be doing around the house as far as picking up, doing things, trying to do something every day.  Also to lessen the animosity between me and my .\"    I will know I've met my goal when my shoulders are fixed and I can see.      Objective #A (Patient Action)    Patient will increase frequency of engaging her in ADLs.  Status: Continued - Date(s): 12/10/21, 3/9/22, 6/9/22, 9/2/22, 12/1/22, 3/2/23    Intervention(s)  Therapist will engage patient in CBT, specifically behavioral activation.    Objective #B  Patient will  track and record at least 5 pleasant exchanges with . Patient will be able to identify at least 5 positive traits about her  and how he relates to her.  Status: Continued - Date(s): 12/10/21, 3/9/22, 6/9/22, 9/2/22, 12/1/22, 3/2/23    Intervention(s)  Therapist will teach assertiveness " "skills and assign homework related to relationship interactions.    Objective #C  Patient will reduce level of depressive and anxious features as evidenced by reduction in score on her CHAVO-7 and PHQ-9 (scores of 15 and 16 at first measurment, respectively).  Status: Continued - Date(s): 12/10/21, 3/9/22, 6/9/22, 9/2/22, 12/1/22, 3/2/23    Intervention(s)  Therapist will engage patient in person-centered therapy and CBT.    Patient has reviewed and agreed to the above plan.      MICAELA SLADE, [facility]  March 2, 2023                                                   Randi Cleary          SAFETY PLAN:  Step 1: Warning signs / cues (Thoughts, images, mood, situation, behavior) that a crisis may be developing:  ? Thoughts: \"I don't want to continue\" \"I am unwanted\"  ? Images: none  ? Thinking Processes: ruminating  ? Mood: anger  ? Behaviors: isolating/withdrawing , can't stop crying, not taking care of myself and not taking care of my responsibilities  ? Situations: small triggers, such as not being able to find something, or dropping something   Step 2: Coping strategies - Things I can do to take my mind off of my problems without contacting another person (relaxation technique, physical activity):  ? Distress Tolerance Strategies:  arts and crafts: drawing, play with my pet , listen to positive and upbeat music: any, change body temperature (ice pack/cold water)  and paced breathing/progressive muscle relaxation  ? Physical Activities: go for a walk, deep breathing and stretching   ? Focus on helpful thoughts:  \"You've been through this before, you can get through it again.\"  Step 3: People and social settings that provide distraction:                 Name: Carmen                            Name: Darien                           Name: Aleida       ? pool, shopping, Carmen's house, [employer]       Step 4: Remind myself of people and things that are important to me and worth living for:  Clifford Little Donna, " post-COVID world, options of what could be in your future        Step 5: When I am in crisis, I can ask these people to help me use my safety plan:                 Name: Sidney  Step 6: Making the environment safe:   ? go to sleep/daydream  Step 7: Professionals or agencies I can contact during a crisis:  ? [facility] Daytime Number: [phone]  ? Suicide Prevention Lifeline: 9-858-469-[place] (6410)  ? Crisis Text Line Service (available 24 hours a day, 7 days a week): Text [place] to 699597    Local Crisis Services: [facility] Crisis: [phone]  Adults can always access to the emPATH unit at [facility] (no phone number, utilize it like an urgent care or ER where you just show up)     Call 911 or go to my nearest emergency department.       I helped develop this safety plan and agree to use it when needed.  I have been given a copy of this plan.       Client signature _________________________________________________________________  Today s date:  11/24/2020  Adapted from Safety Plan Template 2008 Randi Poole and Robby Barba is reprinted with the express permission of the authors.  No portion of the Safety Plan Template may be reproduced without the express, written permission.  You can contact the authors at [email or web address] or [email or web address].

## 2023-05-22 ENCOUNTER — VIRTUAL VISIT (OUTPATIENT)
Dept: PSYCHOLOGY | Facility: CLINIC | Age: 68
End: 2023-05-22
Payer: MEDICARE

## 2023-05-22 DIAGNOSIS — F31.81 BIPOLAR 2 DISORDER (H): Primary | ICD-10-CM

## 2023-05-22 DIAGNOSIS — F41.1 GENERALIZED ANXIETY DISORDER: ICD-10-CM

## 2023-05-22 DIAGNOSIS — F43.9 TRAUMA AND STRESSOR-RELATED DISORDER: ICD-10-CM

## 2023-05-22 PROCEDURE — 90837 PSYTX W PT 60 MINUTES: CPT | Mod: VID | Performed by: SOCIAL WORKER

## 2023-05-22 ASSESSMENT — ANXIETY QUESTIONNAIRES
1. FEELING NERVOUS, ANXIOUS, OR ON EDGE: MORE THAN HALF THE DAYS
3. WORRYING TOO MUCH ABOUT DIFFERENT THINGS: SEVERAL DAYS
4. TROUBLE RELAXING: MORE THAN HALF THE DAYS
GAD7 TOTAL SCORE: 10
8. IF YOU CHECKED OFF ANY PROBLEMS, HOW DIFFICULT HAVE THESE MADE IT FOR YOU TO DO YOUR WORK, TAKE CARE OF THINGS AT HOME, OR GET ALONG WITH OTHER PEOPLE?: VERY DIFFICULT
6. BECOMING EASILY ANNOYED OR IRRITABLE: MORE THAN HALF THE DAYS
5. BEING SO RESTLESS THAT IT IS HARD TO SIT STILL: SEVERAL DAYS
GAD7 TOTAL SCORE: 10
2. NOT BEING ABLE TO STOP OR CONTROL WORRYING: SEVERAL DAYS
IF YOU CHECKED OFF ANY PROBLEMS ON THIS QUESTIONNAIRE, HOW DIFFICULT HAVE THESE PROBLEMS MADE IT FOR YOU TO DO YOUR WORK, TAKE CARE OF THINGS AT HOME, OR GET ALONG WITH OTHER PEOPLE: VERY DIFFICULT
7. FEELING AFRAID AS IF SOMETHING AWFUL MIGHT HAPPEN: SEVERAL DAYS
7. FEELING AFRAID AS IF SOMETHING AWFUL MIGHT HAPPEN: SEVERAL DAYS

## 2023-05-22 NOTE — PROGRESS NOTES
[facility]                                     Progress Note    Patient Name: Randi Cleary  Date: 5/22/23         Service Type: Individual     Session Start Time: 11:01 AM  Session End Time: 11:54 AM     Session Length: 53 minutes    Session #: 177    Attendees: Client attended alone    Service Modality:  Video Visit:      Provider verified identity through the following two step process.  Patient provided:  Patient is known previously to provider    Telemedicine Visit: The patient's condition can be safely assessed and treated via synchronous audio and visual telemedicine encounter.      Reason for Telemedicine Visit: Patient convenience (e.g. access to timely appointments / distance to available provider)    Originating Site (Patient Location): Patient's home    Distant Site (Provider Location): Provider Remote Setting- Home Office    Consent:  The patient/guardian has verbally consented to: the potential risks and benefits of telemedicine (video visit) versus in person care; bill my insurance or make self-payment for services provided; and responsibility for payment of non-covered services.     Patient would like the video invitation sent by:  My Chart    Mode of Communication:  Video Conference via [facility]    Distant Location (Provider):  Off-site    As the provider I attest to compliance with applicable laws and regulations related to telemedicine.      DATA  Extended Session (53+ minutes): PROLONGED SERVICE IN THE OUTPATIENT SETTING REQUIRING DIRECT (FACE-TO-FACE) PATIENT CONTACT BEYOND THE USUAL SERVICE:    - Patient's presenting concerns require more intensive intervention than could be completed within the usual service  Interactive Complexity: No  Crisis: No        Progress Since Last Session (Related to Symptoms / Goals / Homework):   Symptoms: Improving Patinet appears more organized and goal oriented    Homework: Progress made  Work on environment  Get back to the pool    In  the future:  Look at a budget  Get back to the pool  Talk to your primary care doctor about what's on your list (mood, neck, arthritis)       Episode of Care Goals: Satisfactory progress - ACTION (Actively working towards change); Intervened by reinforcing change plan / affirming steps taken     Current / Ongoing Stressors and Concerns:   Patient is currently socially isolated. She has a conflictual relationship with her .  She is getting minimal physical activity.  She has had several surgeries.      Treatment Objective(s) Addressed in This Session:   Patient will increase frequency of engaging her in ADLs.  Patient will track and record at least 5 pleasant exchanges with . Patient will be able to identify at least 5 positive traits about her .  Patient will reduce level of depressive and anxious features as evidenced by reduction in score on her CHAVO-7 and PHQ-9 (scores of 15 and 16 at first measurment, respectively).     Intervention:   Supportive Therapy:  Processed patient's medical concerns and talked about next steps, identifying her priorities. Looked at mood at being her prime concern, then triage for for medical symptoms. Talked through what steps she was taking with this.         The following assessments were completed by patient for this visit:  PHQ9:       3/13/2023     8:02 AM 3/20/2023    10:35 AM 4/7/2023    12:25 PM 4/25/2023    12:02 PM 4/28/2023    12:33 PM 5/5/2023     8:09 AM 5/16/2023     8:39 AM   PHQ-9 SCORE   PHQ-9 Total Score MyChart 16 (Moderately severe depression) 15 (Moderately severe depression) 14 (Moderate depression) 11 (Moderate depression) 12 (Moderate depression) 15 (Moderately severe depression) 14 (Moderate depression)   PHQ-9 Total Score 16 15 14 11 12 15 14     GAD7:       2/20/2023    10:48 AM 3/9/2023     9:27 AM 3/24/2023     9:06 AM 4/7/2023    12:26 PM 4/25/2023    12:02 PM 5/5/2023     8:07 AM 5/22/2023    10:12 AM   CHAVO-7 SCORE   Total Score 17  (severe anxiety) 15 (severe anxiety) 15 (severe anxiety) 10 (moderate anxiety) 10 (moderate anxiety) 11 (moderate anxiety) 10 (moderate anxiety)   Total Score 17    17    17    17    17 15    15    15 15    15    15    15 10 10    10 11 10     PROMIS 10-Global Health (only subscores and total score):       1/5/2023    11:28 AM 2/5/2023     9:48 PM 3/9/2023     9:30 AM 4/7/2023    12:28 PM 4/19/2023     8:26 AM 4/25/2023    12:04 PM 5/5/2023     8:12 AM   PROMIS-10 Scores Only   Global Mental Health Score 5    5        5 5    5    5    5    5 6    6    6 7    7    7    7 8    8    8 7    7 6   Global Physical Health Score 8    8        8 8    8    8    8    8 6    6    6 8    8    8    8 7    7    7 10    10 9   PROMIS TOTAL - SUBSCORES 13    13        13 13    13    13    13    13 12    12    12 15    15    15    15 15    15    15 17    17 15       Information is confidential and restricted. Go to Review Flowsheets to unlock data.    Multiple values from one day are sorted in reverse-chronological order        ASSESSMENT: Current Emotional / Mental Status (status of significant symptoms):   Risk status (Self / Other harm or suicidal ideation)   Patient denies current fears or concerns for personal safety.   Patient denies current or recent suicidal ideation or behaviors.   Patient denies current or recent homicidal ideation or behaviors.   Patient denies current or recent self injurious behavior or ideation.   Patient denies other safety concerns.   Patient reports there has been no change in risk factors since their last session.     Patient reports there has been no change in protective factors since their last session.     A safety and risk management plan has been developed including: Patient consented to co-developed safety plan on 11/24/2020, updated 2/20/23.  Safety and risk management plan was reviewed.   Patient agreed to use safety plan should any safety concerns arise.  A copy was made available to the  patient.     Appearance:   Appropriate    Eye Contact:   Good    Psychomotor Behavior: Normal    Attitude:   Cooperative    Orientation:   All   Speech    Rate / Production: Normal/ Responsive    Volume:  Normal    Mood:    Normal   Affect:    Appropriate    Thought Content:  Clear    Thought Form:  Coherent    Insight:    Good      Medication Review:   No changes to current psychiatric medication(s)     Medication Compliance:   Yes     Changes in Health Issues:   None reported     Chemical Use Review:   Substance Use: Chemical use reviewed, no active concerns identified Nothing used since August 2021.     Tobacco Use: No current tobacco use.      Diagnosis:  1. Bipolar 2 disorder (H)    2. Generalized anxiety disorder    3. Trauma and stressor-related disorder      Collateral Reports Completed:   Not Applicable    PLAN: (Patient Tasks / Therapist Tasks / Other)  Make appointment with Bre Curry for PT  Get to the [facility]  [facility] for shoulder consult  Talk to [facility] about finding long term psychiatry    In the future:  Look at a budget  Get back to the pool  Talk to your primary care doctor about what's on your list (mood, neck, arthritis)        There has been demonstrated improvement in functioning while patient has been engaged in psychotherapy/psychological service- if withdrawn the patient would deteriorate and/or relapse.     MICAELA SLADE, [facility]   May 22, 2023                                                        ______________________________________________________________________    Individual Treatment Plan    Patient's Name: Randi Cleary  YOB: 1955    Date of Creation: 6/30/20  Date Treatment Plan Last Reviewed/Revised: 3/2/23    DSM5 Diagnoses: 296.89 Bipolar II Disorder Depressed, 300.02 (F41.1) Generalized Anxiety Disorder or Adjustment Disorders  309.89 (F43.8) Other Specified Trauma and Stressor Related Disorder  Psychosocial / Contextual Factors: Patient's  "entire family of origin has , she now has a sister-in-law and  as support.  Relationship with  is conflictual. She is recovering from surgeries  PROMIS (reviewed every 90 days): PROMIS-10 Scores  PROMIS 10-Global Health (only subscores and total score):   PROMIS-10 Scores Only 2021 3/15/2022 3/15/2022 3/15/2022 3/24/2022 2022 2022   Global Mental Health Score 6 6 6 6 8 12 12   Global Physical Health Score 9 9 9 9 8 11 10   PROMIS TOTAL - SUBSCORES 15 15 15 15 16 23 22       Referral / Collaboration:  Referral to another professional/service is not indicated at this time..    Anticipated number of session for this episode of care: 50  Anticipation frequency of session: Biweekly  Anticipated Duration of each session: 53 or more minutes due to intensity of trauma symptoms  Treatment plan will be reviewed in 90 days or when goals have been changed.   There has been demonstrated improvement in functioning while patient has been engaged in psychotherapy/psychological service- if withdrawn the patient would deteriorate and/or relapse.       MeasurableTreatment Goal(s) related to diagnosis / functional impairment(s)  Goal 1: Patient will \"jumpstart, getting going with the things I need to be doing around the house as far as picking up, doing things, trying to do something every day.  Also to lessen the animosity between me and my .\"    I will know I've met my goal when my shoulders are fixed and I can see.      Objective #A (Patient Action)    Patient will increase frequency of engaging her in ADLs.  Status: Continued - Date(s): 12/10/21, 3/9/22, 22, 22, 22, 3/2/23    Intervention(s)  Therapist will engage patient in CBT, specifically behavioral activation.    Objective #B  Patient will  track and record at least 5 pleasant exchanges with . Patient will be able to identify at least 5 positive traits about her  and how he relates to her.  Status: Continued - " "Date(s): 12/10/21, 3/9/22, 6/9/22, 9/2/22, 12/1/22, 3/2/23    Intervention(s)  Therapist will teach assertiveness skills and assign homework related to relationship interactions.    Objective #C  Patient will reduce level of depressive and anxious features as evidenced by reduction in score on her CHAVO-7 and PHQ-9 (scores of 15 and 16 at first measurment, respectively).  Status: Continued - Date(s): 12/10/21, 3/9/22, 6/9/22, 9/2/22, 12/1/22, 3/2/23    Intervention(s)  Therapist will engage patient in person-centered therapy and CBT.    Patient has reviewed and agreed to the above plan.      MICAELA SLADE, [facility]  March 2, 2023                                                   Randi Cleary          SAFETY PLAN:  Step 1: Warning signs / cues (Thoughts, images, mood, situation, behavior) that a crisis may be developing:  ? Thoughts: \"I don't want to continue\" \"I am unwanted\"  ? Images: none  ? Thinking Processes: ruminating  ? Mood: anger  ? Behaviors: isolating/withdrawing , can't stop crying, not taking care of myself and not taking care of my responsibilities  ? Situations: small triggers, such as not being able to find something, or dropping something   Step 2: Coping strategies - Things I can do to take my mind off of my problems without contacting another person (relaxation technique, physical activity):  ? Distress Tolerance Strategies:  arts and crafts: drawing, play with my pet , listen to positive and upbeat music: any, change body temperature (ice pack/cold water)  and paced breathing/progressive muscle relaxation  ? Physical Activities: go for a walk, deep breathing and stretching   ? Focus on helpful thoughts:  \"You've been through this before, you can get through it again.\"  Step 3: People and social settings that provide distraction:                 Name: Carmen                            Name: Darien                           Name: Aleida       ? pool, shopping, Carmen's house, [employer]       Step 4: " Remind myself of people and things that are important to me and worth living for:  SidneyClifford Donna, post-COVID world, options of what could be in your future        Step 5: When I am in crisis, I can ask these people to help me use my safety plan:                 Name: Sidney  Step 6: Making the environment safe:   ? go to sleep/daydream  Step 7: Professionals or agencies I can contact during a crisis:  ? [facility] Daytime Number: [phone]  ? Suicide Prevention Lifeline: 9-506-090-KQNK (6225)  ? Crisis Text Line Service (available 24 hours a day, 7 days a week): Text [place] to 973030    Local Crisis Services: [facility] Crisis: [phone]  Adults can always access to the emPATH unit at [facility] (no phone number, utilize it like an urgent care or ER where you just show up)     Call 911 or go to my nearest emergency department.       I helped develop this safety plan and agree to use it when needed.  I have been given a copy of this plan.       Client signature _________________________________________________________________  Today s date:  11/24/2020  Adapted from Safety Plan Template 2008 Randi Poole and Robby Barab is reprinted with the express permission of the authors.  No portion of the Safety Plan Template may be reproduced without the express, written permission.  You can contact the authors at [email or web address] or [email or web address].

## 2023-05-23 ENCOUNTER — VIRTUAL VISIT (OUTPATIENT)
Dept: INTERNAL MEDICINE | Facility: CLINIC | Age: 68
End: 2023-05-23
Payer: MEDICARE

## 2023-05-23 DIAGNOSIS — E83.110 HEREDITARY HEMOCHROMATOSIS (H): ICD-10-CM

## 2023-05-23 DIAGNOSIS — E11.9 TYPE 2 DIABETES MELLITUS WITHOUT COMPLICATION, WITHOUT LONG-TERM CURRENT USE OF INSULIN (H): ICD-10-CM

## 2023-05-23 DIAGNOSIS — M48.02 CERVICAL STENOSIS OF SPINAL CANAL: ICD-10-CM

## 2023-05-23 DIAGNOSIS — F31.81 BIPOLAR 2 DISORDER (H): ICD-10-CM

## 2023-05-23 DIAGNOSIS — G25.81 RESTLESS LEG SYNDROME: ICD-10-CM

## 2023-05-23 DIAGNOSIS — M19.90 ARTHRITIS: ICD-10-CM

## 2023-05-23 DIAGNOSIS — J42 CHRONIC BRONCHITIS, UNSPECIFIED CHRONIC BRONCHITIS TYPE (H): ICD-10-CM

## 2023-05-23 PROCEDURE — 99214 OFFICE O/P EST MOD 30 MIN: CPT | Mod: VID | Performed by: INTERNAL MEDICINE

## 2023-05-23 NOTE — PROGRESS NOTES
Winnie is a 67 year old who is being evaluated via a billable video visit.      How would you like to obtain your AVS? MyChart  If the video visit is dropped, the invitation should be resent by: Send to e-mail at: [email or web address]  Will anyone else be joining your video visit? No    Assessment & Plan   Problem List Items Addressed This Visit        Respiratory    COPD (chronic obstructive pulmonary disease) (H)     Follows with pulmonology. Continues on trelegy and PRN albuterol            Digestive    Hereditary hemochromatosis (H)     Follows with hematology. Labs 5/2023 showed ferritin of 64.             Endocrine    Type 2 diabetes mellitus without complication, without long-term current use of insulin (H)     Off meds. Will repeat labs next in person visit.             Musculoskeletal and Integumentary    Restless leg syndrome     Stable on Requip 2 mg in afternoon, 1 mg at bedtime and 1 mg when awake.   - Need to keep ferritin above 125         Arthritis     Causing significant issues recently. She follows with Dr. Dubois in pain clinic and has visit upcoming with rheumatology, Dr. Harris at [facility] in July.             Behavioral    Bipolar 2 disorder (H)     Follows with therapist, Dr. Dubois and Dr. Das. Mood is biggest problem and trouble for her right now. I did encourage her to discuss possible medications with her psychiatrists to see if adding anything back would be advised.             Other    Cervical stenosis of spinal canal     MRI 5/1/23 showed mild spinal canal stenosis at C6-7 and multilevel neural foraminal stenosis.   - per NSGY no surgical intervention needed             Review of external notes as documented elsewhere in note  No LOS data to display   Time spent by me doing chart review, history and exam, documentation and further activities per the note     FUTURE APPOINTMENTS:       - Follow-up visit in person in 2-3 months     Chikis Randle MD  [facility]  ALEJANDRA Zhou is a 67 year old, presenting for the following health issues:  Follow Up        1/11/2023     1:19 PM   Additional Questions   Roomed by Missy MILES CMA     History of Present Illness     Reason for visit:  Follow up     Neck: saw our spine team, got C spine MRI 5/1/23 - showed:   1. Surgical changes of left-sided open-door laminoplasty from C3 to  C6. No residual high-grade spinal canal stenosis. Mild spinal canal  stenosis is seen at the C6-7 level.  2. Multilevel neural foraminal stenosis detailed above. There is  moderate to severe right-sided neural foraminal stenosis at the C3-4.  Per telephone notes, no surgical indication from this imaging, okay to proceed with shoulder operation.     Then, had Had EMG done at [place] 5/12/23 (note reviewed): Showed L ulnar neuropathy at elbow. Some continued slowing in median nerve on R - likely post-operative change/healing b/c much better than prior EMG in 2021. No radiculopathy or brachial plexopathy.     Arthritis / Pain: Continues to follow with Dr. Dubois. Will see Dr. Harris, rheumatology, at [facility] 7/17/23. Started working with PT at [facility] again. 4 visits left.   - Labs from Dr. Dubois 5/1/23 showed continued polycythemia. Iron levels okay with ferritin 64, elevated IL6, normal TNF alpha, negative SALVADOR    Mood: says today that her number one issue is mood disorder, anxiety and panic attacks right now. Has been able to look into how she reacts to things. Gets very overwhelmed when thinking about chronic conditions that aren't getting fixed. Has not been on any specific antidepressants or anxiety for a few years with h/o serotonin syndrome. She does continue to follow with [facility]. Das [facility] and Mary Kay Gomez (therapist) through [place].     Polycythemia: Likely 2/2 untreated KYAW     HH: Follows with hematology, labs [id number], next visit 7/2023 with Dr. Pablo. Phlebotomy for ferritin >250.     RLS: Requip 2 mg in afternoon, 1  mg at bedtime and 1 mg when awake.   - Need to keep ferritin above 125    She eats 0-1 servings of fruits and vegetables daily.She consumes 0 sweetened beverage(s) daily.She exercises with enough effort to increase her heart rate 9 or less minutes per day.  She exercises with enough effort to increase her heart rate 3 or less days per week.   She is taking medications regularly.         Review of Systems   Constitutional, HEENT, cardiovascular, pulmonary, GI, , musculoskeletal, neuro, skin, endocrine and psych systems are negative, except as otherwise noted.      Objective             Physical Exam   GENERAL: Healthy, alert and no distress  EYES: Eyes grossly normal to inspection.  No discharge or erythema, or obvious scleral/conjunctival abnormalities.  RESP: No audible wheeze, cough, or visible cyanosis.  No visible retractions or increased work of breathing.    SKIN: Visible skin clear. No significant rash, abnormal pigmentation or lesions.  NEURO: Cranial nerves grossly intact.  Mentation and speech appropriate for age.  PSYCH: Mentation appears normal, affect normal/bright, judgement and insight intact, normal speech and appearance well-groomed.    Lab on 05/01/2023   Component Date Value Ref Range Status     Iron 05/01/2023 85  37 - 145 ug/dL Final     Iron Binding Capacity 05/01/2023 293  240 - 430 ug/dL Final     Iron Sat Index 05/01/2023 29  15 - 46 % Final     Ferritin 05/01/2023 64  11 - 328 ng/mL Final     % Reticulocyte 05/01/2023 1.5  0.5 - 2.0 % Final     Absolute Reticulocyte 05/01/2023 0.084  0.025 - 0.095 10e6/uL Final     Tumor Necrosis Factor Alpha Blood 05/01/2023 8.7  <8.8 pg/mL Final     Interleukin 6 Blood 05/01/2023 55.14 (H)  <3.01 pg/mL Final     SALVADOR interpretation 05/01/2023 Negative  Negative Final      Negative:              <1:40  Borderline Positive:   1:40 - 1:80  Positive:              >1:80     WBC Count 05/01/2023 11.0  4.0 - 11.0 10e3/uL Final     RBC Count 05/01/2023 5.44 (H)   3.80 - 5.20 10e6/uL Final     Hemoglobin 05/01/2023 18.1 (H)  11.7 - 15.7 g/dL Final     Hematocrit 05/01/2023 53.0 (H)  35.0 - 47.0 % Final     MCV 05/01/2023 97  78 - 100 fL Final     MCH 05/01/2023 33.3 (H)  26.5 - 33.0 pg Final     MCHC 05/01/2023 34.2  31.5 - 36.5 g/dL Final     RDW 05/01/2023 12.5  10.0 - 15.0 % Final     Platelet Count 05/01/2023 213  150 - 450 10e3/uL Final     % Neutrophils 05/01/2023 82  % Final     % Lymphocytes 05/01/2023 11  % Final     % Monocytes 05/01/2023 4  % Final     % Eosinophils 05/01/2023 1  % Final     % Basophils 05/01/2023 1  % Final     % Immature Granulocytes 05/01/2023 1  % Final     NRBCs per 100 WBC 05/01/2023 0  <1 /100 Final     Absolute Neutrophils 05/01/2023 9.1 (H)  1.6 - 8.3 10e3/uL Final     Absolute Lymphocytes 05/01/2023 1.2  0.8 - 5.3 10e3/uL Final     Absolute Monocytes 05/01/2023 0.5  0.0 - 1.3 10e3/uL Final     Absolute Eosinophils 05/01/2023 0.1  0.0 - 0.7 10e3/uL Final     Absolute Basophils 05/01/2023 0.1  0.0 - 0.2 10e3/uL Final     Absolute Immature Granulocytes 05/01/2023 0.1  <=0.4 10e3/uL Final     Absolute NRBCs 05/01/2023 0.0  10e3/uL Final       5:41  6:13      Video-Visit Details    Type of service:  Video Visit   Video Start Time: 5:41  Video End Time:6:13    Originating Location (pt. Location): Home  Distant Location (provider location):  On-site  Platform used for Video Visit: [facility]    Answers for HPI/ROS submitted by the patient on 5/23/2023  What is the reason for your visit today? : neck test  How many servings of fruits and vegetables do you eat daily?: 0-1  On average, how many sweetened beverages do you drink each day (Examples: soda, juice, sweet tea, etc.  Do NOT count diet or artificially sweetened beverages)?: 0  How many minutes a day do you exercise enough to make your heart beat faster?: 9 or less  How many days a week do you exercise enough to make your heart beat faster?: 3 or less  How many days per week do you miss taking  your medication?: 0

## 2023-05-24 ENCOUNTER — VIRTUAL VISIT (OUTPATIENT)
Dept: PSYCHOLOGY | Facility: CLINIC | Age: 68
End: 2023-05-24
Payer: MEDICARE

## 2023-05-24 DIAGNOSIS — F41.1 GENERALIZED ANXIETY DISORDER: ICD-10-CM

## 2023-05-24 DIAGNOSIS — F31.81 BIPOLAR 2 DISORDER (H): Primary | ICD-10-CM

## 2023-05-24 DIAGNOSIS — F43.9 TRAUMA AND STRESSOR-RELATED DISORDER: ICD-10-CM

## 2023-05-24 PROCEDURE — 90837 PSYTX W PT 60 MINUTES: CPT | Mod: VID | Performed by: SOCIAL WORKER

## 2023-05-25 PROBLEM — M48.02 CERVICAL STENOSIS OF SPINAL CANAL: Status: ACTIVE | Noted: 2021-12-21

## 2023-05-25 NOTE — ASSESSMENT & PLAN NOTE
Causing significant issues recently. She follows with Dr. Dubois in pain clinic and has visit upcoming with rheumatology, Dr. Harris at [facility] in July.

## 2023-05-25 NOTE — ASSESSMENT & PLAN NOTE
Follows with therapist, Dr. Dubois and Dr. Das. Mood is biggest problem and trouble for her right now. I did encourage her to discuss possible medications with her psychiatrists to see if adding anything back would be advised.

## 2023-05-25 NOTE — ASSESSMENT & PLAN NOTE
MRI 5/1/23 showed mild spinal canal stenosis at C6-7 and multilevel neural foraminal stenosis.   - per NSGY no surgical intervention needed

## 2023-05-26 ENCOUNTER — TRANSFERRED RECORDS (OUTPATIENT)
Dept: HEALTH INFORMATION MANAGEMENT | Facility: CLINIC | Age: 68
End: 2023-05-26
Payer: MEDICARE

## 2023-05-27 ENCOUNTER — HEALTH MAINTENANCE LETTER (OUTPATIENT)
Age: 68
End: 2023-05-27

## 2023-05-30 ENCOUNTER — OFFICE VISIT (OUTPATIENT)
Dept: PALLIATIVE MEDICINE | Facility: OTHER | Age: 68
End: 2023-05-30
Attending: ANESTHESIOLOGY
Payer: MEDICARE

## 2023-05-30 VITALS
OXYGEN SATURATION: 94 % | HEART RATE: 97 BPM | DIASTOLIC BLOOD PRESSURE: 83 MMHG | BODY MASS INDEX: 37.77 KG/M2 | WEIGHT: 234 LBS | SYSTOLIC BLOOD PRESSURE: 121 MMHG

## 2023-05-30 DIAGNOSIS — F31.81 BIPOLAR 2 DISORDER (H): ICD-10-CM

## 2023-05-30 DIAGNOSIS — G25.81 RESTLESS LEGS SYNDROME (RLS): ICD-10-CM

## 2023-05-30 DIAGNOSIS — G95.20 CORD COMPRESSION (H): ICD-10-CM

## 2023-05-30 PROCEDURE — 99214 OFFICE O/P EST MOD 30 MIN: CPT | Performed by: ANESTHESIOLOGY

## 2023-05-30 PROCEDURE — G0463 HOSPITAL OUTPT CLINIC VISIT: HCPCS

## 2023-05-30 RX ORDER — ROPINIROLE 2 MG/1
TABLET, FILM COATED ORAL
Qty: 90 TABLET | Refills: 3 | Status: SHIPPED | OUTPATIENT
Start: 2023-05-30 | End: 2023-08-31

## 2023-05-30 RX ORDER — HYDROXYZINE HYDROCHLORIDE 25 MG/1
25 TABLET, FILM COATED ORAL EVERY 8 HOURS PRN
Qty: 60 TABLET | Refills: 3 | Status: SHIPPED | OUTPATIENT
Start: 2023-05-30 | End: 2023-08-15

## 2023-05-30 RX ORDER — HYDROCODONE BITARTRATE AND ACETAMINOPHEN 5; 325 MG/1; MG/1
1 TABLET ORAL EVERY 6 HOURS PRN
Qty: 112 TABLET | Refills: 0 | Status: SHIPPED | OUTPATIENT
Start: 2023-05-30 | End: 2023-06-27

## 2023-05-30 NOTE — PROGRESS NOTES
[facility] - Office Visit    ASSESSMENT & PLAN     Randi was seen today for pain.    Diagnoses and all orders for this visit:    Bipolar 2 disorder (H)  -     hydrOXYzine (ATARAX) 25 MG tablet; Take 1 tablet (25 mg) by mouth every 8 hours as needed for anxiety    Cord compression (H)  -     HYDROcodone-acetaminophen (NORCO) 5-325 MG tablet; Take 1 tablet by mouth every 6 hours as needed for breakthrough pain or moderate to severe pain  -     Physical Therapy Referral; Future    Restless legs syndrome (RLS)  -     rOPINIRole (REQUIP) 2 MG tablet; One tab 3 pm, one bedtime, and one during night on waking        Patient Instructions     [facility]    Clinic Number:  [phone]    Call with any questions about your care and for scheduling assistance.     Calls are returned Monday through Friday between 8 AM and 4:30 PM. We usually get back to you within 2 business days depending on the issue/request.    If we are prescribing your medications:    For opioid medication refills, call the clinic or send a Effektif message 7 days in advance.  Please include:    Name of requested medication    Name of the pharmacy.    For non-opioid medications, call your pharmacy directly to request a refill. Please allow 3-4 days to be processed.     Per [place] State Law:    All controlled substance prescriptions must be filled within 30 days of being written.      For those controlled substances allowing refills, pickup must occur within 30 days of last fill.      We believe regular attendance is key to your success in our program!      Any time you are unable to keep your appointment we ask that you call us at least 24 hours in advance to cancel.This will allow us to offer the appointment time to another patient.     Multiple missed appointments may lead to dismissal from the clinic.     PLAN:    You are seen your rheumatologist next month.  You may ask for a second opinion for  "other musculoskeletal concerns within the organization    You are scheduled on 6/1 for occipital nerve block follow-up with Dr. Jackson.    You may discuss with the psychiatrist in your treatment program about other medication regimen.    Discussed the role of frequency specific microcurrent  May contact these providers to see if they take your insurance or you want to work with transitions [phone], [facility] [phone], [facility] [phone], [facility] [phone].    Referral placed to physical therapy to help with deconditioning near goal of being more active and weight loss.    Follow-up with Dr. Wiggins in 8 weeks        -----  AXEL WIGGINS MD  [facility]       SUBJECTIVE      Randi Cleary is a 67 year old year old female who presents to clinic today for the following:     Follow-up for arthralgias, cervical radiculopathy, mood disorder.    She reviews frustration she is \"not moving forward\".  She is weepy indicating spending much of his time at home.    She continues going to her group weekly finding that helpful.    She reviews having EMG through some orthopedics.  Told that her right wrist has healed though she feels its not improving with numbness.  The left upper extremity showed some entrapment at the elbow and he may look into that further.    Continues pain in the right wrist, demonstrates pressing on it.  Also indicates that her third MCP can catch, she thinks it reflects arthritis, reviewed, possibly ligamentous and trigger finger.  She sees her dermatologist next month.    Notes also the tendons of her palm can sometimes hurt in her right Achilles tendon.  Reviewed she had tried Levaquin but more than 5 years ago she thinks.    She will be seeing her ankle surgeon again in July before he retired for updates.    She has had evaluations for her right shoulder, told her right shoulder has a tear left one is also bad.    Has been " "using the hydrocodone 5 mg 4 times a day as needed over the last couple of days has added some extra doses.    Feels that her upper arms are becoming \"useless\" in terms of the strength.    Continues followed up for laminectomy and myelopathy.  Told that it might take a year to improve.  Reviews frustrations that it feels like she waited so long in the fall 2021 due to COVID and has not felt well since.    She would like to return to physical therapy for some conditioning and becoming more active and for weight loss.    She had plan to meet with the psychiatrist from her program to look into other medications for depression.  She does continue on the oxcarbazepine.  We discussed challenges with serotonin syndrome.    She is scheduled for occipital nerve block soon with Dr. Jackson.    Reviewed the laboratories she had obtained, elevated interleukin-6 but not tumor necrosis factor.  We will discuss this with the rheumatologist.    Last urine drug test in November.   reviewed      Current Outpatient Medications:      acetaminophen (TYLENOL) 325 MG tablet, Take 2 tablets (650 mg) by mouth every 4 hours as needed for other (For optimal non-opioid multimodal pain management to improve pain control.), Disp: , Rfl:      albuterol (PROVENTIL) (2.5 MG/3ML) 0.083% neb solution, Take 1 vial (2.5 mg) by nebulization every 4 hours as needed for shortness of breath / dyspnea or wheezing, Disp: 360 mL, Rfl: 5     albuterol (VENTOLIN HFA) 108 (90 Base) MCG/ACT inhaler, Inhale 2 puffs into the lungs every 6 hours, Disp: 18 g, Rfl: 11     Cholecalciferol (VITAMIN D3) 250 MCG (25679 UT) TABS, Take 1 tablet by mouth daily 47023/day, Disp: , Rfl:      Cyanocobalamin (VITAMIN B-12) 5000 MCG SUBL, Place 2-3 sprays under the tongue daily Unknown dose. 2 or 3 sprays/day, Disp: , Rfl:      ethacrynic acid (EDECRIN) 25 MG tablet, Take 1 tablet (25 mg) by mouth every other day, Disp: 45 tablet, Rfl: 3     Fluticasone-Umeclidin-Vilanterol " (TRELEGY ELLIPTA) 200-62.5-25 MCG/INH oral inhaler, Inhale 1 puff into the lungs daily, Disp: 4 each, Rfl: 3     HYDROcodone-acetaminophen (NORCO) 5-325 MG tablet, Take 1 tablet by mouth every 6 hours as needed for breakthrough pain or moderate to severe pain, Disp: 112 tablet, Rfl: 0     hydrOXYzine (ATARAX) 25 MG tablet, Take 1 tablet (25 mg) by mouth every 8 hours as needed for anxiety, Disp: 60 tablet, Rfl: 3     KLOR-CON 20 MEQ CR tablet, Take 1 tablet (20 mEq total) by mouth 2 (two) times a day., Disp: 180 tablet, Rfl: 0     LITHIUM PO, Take 25 mg by mouth daily OTC lithium oratate, Disp: , Rfl:      magnesium 250 MG tablet, Take 1 tablet by mouth 2 times daily, Disp: , Rfl:      medical cannabis (Patient's own supply), See Admin Instructions (The purpose of this order is to document that the patient reports taking medical cannabis.  This is not a prescription, and is not used to certify that the patient has a qualifying medical condition.) Flower, Disp: , Rfl:      methocarbamol (ROBAXIN) 500 MG tablet, Take 1 tablet (500 mg) by mouth 3 times daily, Disp: 90 tablet, Rfl: 3     omeprazole (PRILOSEC) 20 MG DR capsule, Take 1 capsule (20 mg) by mouth daily, Disp: 90 capsule, Rfl: 3     OXcarbazepine (TRILEPTAL) 150 MG tablet, One and one-half tab bedtime, Disp: 45 tablet, Rfl: 3     rOPINIRole (REQUIP) 2 MG tablet, One tab 3 pm, one bedtime, and one during night on waking, Disp: 90 tablet, Rfl: 3     SYNTHROID 150 MCG tablet, Take 1 tablet (150 mcg) by mouth daily, Disp: 90 tablet, Rfl: 4     vitamin E 400 units TABS, Take 800 Units by mouth daily, Disp: , Rfl:       Review of Systems   General, psych, musculoskeletal, bowels and bladder otherwise normal other than above.          OBJECTIVE   /83   Pulse 97   Wt 106.1 kg (234 lb)   SpO2 94%   BMI 37.77 kg/m        Physical Exam  General: Alert, clear sensorium.  No respiratory distress.  Right wrist splint  Cardiovascular: Normal rate  Lungs: Pulmonary  effort is normal, speaking in full sentences  MSK:   Skin: No concerning rashes or lesions.  Neurologic: No focal deficit, alert and oriented x3  Psychiatric: Tearful, composes self as the session goes on      Assessment: Chronic pain includes arthralgias, history of cervical myelopathy.  Dealing with [facility] as above.  I did review with her the description and the EMG that the right upper extremity is having prolonged healing since the carpal tunnel release which seem provide some validation.    I reviewed the modality of frequency specific microcurrent which may help some muscular skeletal symptoms as she is trying to minimize medications and resources provided.    Additional plan as above.    Total time 36 minutes

## 2023-05-30 NOTE — PROGRESS NOTES
Patient presents to the clinic today for a follow up with AXEL WIGGINS MD regarding Pain Management.          8/12/2022     8:40 AM 4/11/2023     8:01 AM 5/30/2023    12:56 PM   PEG Score   PEG Total Score 8.33 8 10       UDS/CSA- 11.16.2022    Medications- [place] 05.30.2023 @1200pm    QUESTIONS:    Lorena GOODWIN [facility] Visit Facilitator

## 2023-05-30 NOTE — PATIENT INSTRUCTIONS
[facility] Number:  [phone]  Call with any questions about your care and for scheduling assistance.   Calls are returned Monday through Friday between 8 AM and 4:30 PM. We usually get back to you within 2 business days depending on the issue/request.    If we are prescribing your medications:  For opioid medication refills, call the clinic or send a Stylythart message 7 days in advance.  Please include:  Name of requested medication  Name of the pharmacy.  For non-opioid medications, call your pharmacy directly to request a refill. Please allow 3-4 days to be processed.   Per [place] State Law:  All controlled substance prescriptions must be filled within 30 days of being written.    For those controlled substances allowing refills, pickup must occur within 30 days of last fill.      We believe regular attendance is key to your success in our program!    Any time you are unable to keep your appointment we ask that you call us at least 24 hours in advance to cancel.This will allow us to offer the appointment time to another patient.   Multiple missed appointments may lead to dismissal from the clinic.     PLAN:    You are seen your rheumatologist next month.  You may ask for a second opinion for other musculoskeletal concerns within the organization    You are scheduled on 6/1 for occipital nerve block follow-up with Dr. Jackson.    You may discuss with the psychiatrist in your treatment program about other medication regimen.    Discussed the role of frequency specific microcurrent  May contact these providers to see if they take your insurance or you want to work with transitions [phone], [facility] [phone], [facility] [phone], [facility] [phone].    Referral placed to physical therapy to help with deconditioning near goal of being more active and weight loss.    Follow-up with Dr. Dubois in 8 weeks

## 2023-05-31 ENCOUNTER — VIRTUAL VISIT (OUTPATIENT)
Dept: PSYCHOLOGY | Facility: CLINIC | Age: 68
End: 2023-05-31
Payer: MEDICARE

## 2023-05-31 DIAGNOSIS — F31.81 BIPOLAR 2 DISORDER (H): Primary | ICD-10-CM

## 2023-05-31 DIAGNOSIS — F41.1 GENERALIZED ANXIETY DISORDER: ICD-10-CM

## 2023-05-31 PROCEDURE — 90837 PSYTX W PT 60 MINUTES: CPT | Mod: VID | Performed by: SOCIAL WORKER

## 2023-05-31 ASSESSMENT — PATIENT HEALTH QUESTIONNAIRE - PHQ9
SUM OF ALL RESPONSES TO PHQ QUESTIONS 1-9: 17
10. IF YOU CHECKED OFF ANY PROBLEMS, HOW DIFFICULT HAVE THESE PROBLEMS MADE IT FOR YOU TO DO YOUR WORK, TAKE CARE OF THINGS AT HOME, OR GET ALONG WITH OTHER PEOPLE: EXTREMELY DIFFICULT
SUM OF ALL RESPONSES TO PHQ QUESTIONS 1-9: 17

## 2023-05-31 NOTE — PROGRESS NOTES
[facility]                                     Progress Note    Patient Name: Randi Cleary  Date: 5/31/23         Service Type: Individual     Session Start Time: 11:08 AM  Session End Time: 12:02 PM     Session Length: 54 minutes    Session #: 179    Attendees: Client attended alone    Service Modality:  Video Visit:      Provider verified identity through the following two step process.  Patient provided:  Patient is known previously to provider    Telemedicine Visit: The patient's condition can be safely assessed and treated via synchronous audio and visual telemedicine encounter.      Reason for Telemedicine Visit: Patient convenience (e.g. access to timely appointments / distance to available provider)    Originating Site (Patient Location): Patient's home    Distant Site (Provider Location): Provider Remote Setting- Home Office    Consent:  The patient/guardian has verbally consented to: the potential risks and benefits of telemedicine (video visit) versus in person care; bill my insurance or make self-payment for services provided; and responsibility for payment of non-covered services.     Patient would like the video invitation sent by:  My Chart    Mode of Communication:  Video Conference via [facility]    Distant Location (Provider):  Off-site    As the provider I attest to compliance with applicable laws and regulations related to telemedicine.      DATA  Extended Session (53+ minutes): No  Interactive Complexity: No  Crisis: No        Progress Since Last Session (Related to Symptoms / Goals / Homework):   Symptoms: Patient continues to be frustrated and overwhelmed    Homework: Progress made  Follow up with Bre adame PT -cancelled today  Get to the [facility] done  Decide what to do about psychiatry -not yet decided    In the future:  Look at a budget  Get back to the pool  Talk to your primary care doctor about what's on your list (mood, neck, arthritis)         Episode of  Care Goals: Satisfactory progress - ACTION (Actively working towards change); Intervened by reinforcing change plan / affirming steps taken     Current / Ongoing Stressors and Concerns:   Patient is currently socially isolated. She has a conflictual relationship with her .  She is getting minimal physical activity.  She has had several surgeries.      Treatment Objective(s) Addressed in This Session:   Patient will increase frequency of engaging her in ADLs.  Patient will track and record at least 5 pleasant exchanges with . Patient will be able to identify at least 5 positive traits about her .  Patient will reduce level of depressive and anxious features as evidenced by reduction in score on her CHAVO-7 and PHQ-9 (scores of 15 and 16 at first measurment, respectively).     Intervention:   Supportive Therapy:   Discussed prioritizing herself in the many changes in her life right now. Talked about which doctor's appointments she was going to focus on for now. Talked about self-care. Reviewed homework and identified successes and barriers to completion.      The following assessments were completed by patient for this visit:  PHQ9:       3/20/2023    10:35 AM 4/7/2023    12:25 PM 4/25/2023    12:02 PM 4/28/2023    12:33 PM 5/5/2023     8:09 AM 5/16/2023     8:39 AM 5/31/2023    10:02 AM   PHQ-9 SCORE   PHQ-9 Total Score MyChart 15 (Moderately severe depression) 14 (Moderate depression) 11 (Moderate depression) 12 (Moderate depression) 15 (Moderately severe depression) 14 (Moderate depression) 17 (Moderately severe depression)   PHQ-9 Total Score 15 14 11 12 15 14 17     GAD7:       2/20/2023    10:48 AM 3/9/2023     9:27 AM 3/24/2023     9:06 AM 4/7/2023    12:26 PM 4/25/2023    12:02 PM 5/5/2023     8:07 AM 5/22/2023    10:12 AM   CHAVO-7 SCORE   Total Score 17 (severe anxiety) 15 (severe anxiety) 15 (severe anxiety) 10 (moderate anxiety) 10 (moderate anxiety) 11 (moderate anxiety) 10 (moderate  anxiety)   Total Score 17    17    17    17    17 15    15    15 15    15    15    15 10 10    10 11 10     PROMIS 10-Global Health (only subscores and total score):       1/5/2023    11:28 AM 2/5/2023     9:48 PM 3/9/2023     9:30 AM 4/7/2023    12:28 PM 4/19/2023     8:26 AM 4/25/2023    12:04 PM 5/5/2023     8:12 AM   PROMIS-10 Scores Only   Global Mental Health Score 5    5        5 5    5    5    5    5 6    6    6 7    7    7    7 8    8    8 7    7 6   Global Physical Health Score 8    8        8 8    8    8    8    8 6    6    6 8    8    8    8 7    7    7 10    10 9   PROMIS TOTAL - SUBSCORES 13    13        13 13    13    13    13    13 12    12    12 15    15    15    15 15    15    15 17    17 15       Information is confidential and restricted. Go to Review Flowsheets to unlock data.    Multiple values from one day are sorted in reverse-chronological order        ASSESSMENT: Current Emotional / Mental Status (status of significant symptoms):   Risk status (Self / Other harm or suicidal ideation)   Patient denies current fears or concerns for personal safety.   Patient denies current or recent suicidal ideation or behaviors.   Patient denies current or recent homicidal ideation or behaviors.   Patient denies current or recent self injurious behavior or ideation.   Patient denies other safety concerns.   Patient reports there has been no change in risk factors since their last session.     Patient reports there has been no change in protective factors since their last session.     A safety and risk management plan has been developed including: Patient consented to co-developed safety plan on 11/24/2020, updated 2/20/23.  Safety and risk management plan was reviewed.   Patient agreed to use safety plan should any safety concerns arise.  A copy was made available to the patient.     Appearance:   Appropriate    Eye Contact:   Good    Psychomotor Behavior: Normal    Attitude:   Cooperative     Orientation:   All   Speech    Rate / Production: Normal/ Responsive    Volume:  Normal    Mood:    Frustrated, overwhelmed   Affect:    Appropriate    Thought Content:  Clear    Thought Form:  Coherent    Insight:    Good      Medication Review:   No changes to current psychiatric medication(s)     Medication Compliance:   Yes     Changes in Health Issues:   None reported     Chemical Use Review:   Substance Use: Chemical use reviewed, no active concerns identified Nothing used since 2021.     Tobacco Use: No current tobacco use.      Diagnosis:  1. Bipolar 2 disorder (H)    2. Generalized anxiety disorder      Collateral Reports Completed:   Not Applicable    PLAN: (Patient Tasks / Therapist Tasks / Other)  Follow up with Bre Curry for PT  Get to the [facility]  Decide what to do about psychiatry    In the future:  Look at a budget  Get back to the pool  Talk to your primary care doctor about what's on your list (mood, neck, arthritis)        There has been demonstrated improvement in functioning while patient has been engaged in psychotherapy/psychological service- if withdrawn the patient would deteriorate and/or relapse.     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  Relationship with  is conflictual. She is recovering from surgeries  PROMIS (reviewed every 90 days): PROMIS-10  "Scores  PROMIS 10-Global Health (only subscores and total score):   PROMIS-10 Scores Only 12/14/2021 3/15/2022 3/15/2022 3/15/2022 3/24/2022 4/1/2022 6/9/2022   Global Mental Health Score 6 6 6 6 8 12 12   Global Physical Health Score 9 9 9 9 8 11 10   PROMIS TOTAL - SUBSCORES 15 15 15 15 16 23 22       Referral / Collaboration:  Referral to another professional/service is not indicated at this time..    Anticipated number of session for this episode of care: 50  Anticipation frequency of session: Biweekly  Anticipated Duration of each session: 53 or more minutes due to intensity of trauma symptoms  Treatment plan will be reviewed in 90 days or when goals have been changed.   There has been demonstrated improvement in functioning while patient has been engaged in psychotherapy/psychological service- if withdrawn the patient would deteriorate and/or relapse.       MeasurableTreatment Goal(s) related to diagnosis / functional impairment(s)  Goal 1: Patient will \"jumpstart, getting going with the things I need to be doing around the house as far as picking up, doing things, trying to do something every day.  Also to lessen the animosity between me and my .\"    I will know I've met my goal when my shoulders are fixed and I can see.      Objective #A (Patient Action)    Patient will increase frequency of engaging her in ADLs.  Status: Continued - Date(s): 12/10/21, 3/9/22, 6/9/22, 9/2/22, 12/1/22, 3/2/23    Intervention(s)  Therapist will engage patient in CBT, specifically behavioral activation.    Objective #B  Patient will  track and record at least 5 pleasant exchanges with . Patient will be able to identify at least 5 positive traits about her  and how he relates to her.  Status: Continued - Date(s): 12/10/21, 3/9/22, 6/9/22, 9/2/22, 12/1/22, 3/2/23    Intervention(s)  Therapist will teach assertiveness skills and assign homework related to relationship interactions.    Objective #C  Patient " "will reduce level of depressive and anxious features as evidenced by reduction in score on her CHAVO-7 and PHQ-9 (scores of 15 and 16 at first measurment, respectively).  Status: Continued - Date(s): 12/10/21, 3/9/22, 6/9/22, 9/2/22, 12/1/22, 3/2/23    Intervention(s)  Therapist will engage patient in person-centered therapy and CBT.    Patient has reviewed and agreed to the above plan.      MICAELA SLADE, [facility]  March 2, 2023                                                   Randi Cleary          SAFETY PLAN:  Step 1: Warning signs / cues (Thoughts, images, mood, situation, behavior) that a crisis may be developing:  ? Thoughts: \"I don't want to continue\" \"I am unwanted\"  ? Images: none  ? Thinking Processes: ruminating  ? Mood: anger  ? Behaviors: isolating/withdrawing , can't stop crying, not taking care of myself and not taking care of my responsibilities  ? Situations: small triggers, such as not being able to find something, or dropping something   Step 2: Coping strategies - Things I can do to take my mind off of my problems without contacting another person (relaxation technique, physical activity):  ? Distress Tolerance Strategies:  arts and crafts: drawing, play with my pet , listen to positive and upbeat music: any, change body temperature (ice pack/cold water)  and paced breathing/progressive muscle relaxation  ? Physical Activities: go for a walk, deep breathing and stretching   ? Focus on helpful thoughts:  \"You've been through this before, you can get through it again.\"  Step 3: People and social settings that provide distraction:                 Name: Carmen                            Name: Darien                           Name: Aleida       ? pool, shopping, Carmen's house, [employer]       Step 4: Remind myself of people and things that are important to me and worth living for:  Clifford Little Donna, post-COVID world, options of what could be in your future        Step 5: When I am in crisis, I " can ask these people to help me use my safety plan:                 Name: Sidney  Step 6: Making the environment safe:   ? go to sleep/daydream  Step 7: Professionals or agencies I can contact during a crisis:  ? [facility] Daytime Number: [phone]  ? Suicide Prevention Lifeline: 2-201-030-TALK (8255)  ? Crisis Text Line Service (available 24 hours a day, 7 days a week): Text [place] to 780782    Local Crisis Services: [facility] Crisis: [phone]  Adults can always access to the emPATH unit at [facility] (no phone number, utilize it like an urgent care or ER where you just show up)     Call 911 or go to my nearest emergency department.       I helped develop this safety plan and agree to use it when needed.  I have been given a copy of this plan.       Client signature _________________________________________________________________  Today s date:  11/24/2020  Adapted from Safety Plan Template 2008 Randi Poole and Robby Barba is reprinted with the express permission of the authors.  No portion of the Safety Plan Template may be reproduced without the express, written permission.  You can contact the authors at [email or web address] or [email or web address].

## 2023-06-01 ENCOUNTER — HOSPITAL ENCOUNTER (OUTPATIENT)
Dept: BEHAVIORAL HEALTH | Facility: CLINIC | Age: 68
Discharge: HOME | End: 2023-06-01
Attending: PSYCHIATRY & NEUROLOGY
Payer: MEDICARE

## 2023-06-01 ENCOUNTER — OFFICE VISIT (OUTPATIENT)
Dept: PALLIATIVE MEDICINE | Facility: OTHER | Age: 68
End: 2023-06-01
Attending: ANESTHESIOLOGY
Payer: MEDICARE

## 2023-06-01 VITALS — DIASTOLIC BLOOD PRESSURE: 72 MMHG | SYSTOLIC BLOOD PRESSURE: 137 MMHG | HEART RATE: 97 BPM | OXYGEN SATURATION: 95 %

## 2023-06-01 DIAGNOSIS — M54.81 BILATERAL OCCIPITAL NEURALGIA: Primary | ICD-10-CM

## 2023-06-01 DIAGNOSIS — M54.12 CERVICAL RADICULOPATHY: Chronic | ICD-10-CM

## 2023-06-01 PROCEDURE — 90853 GROUP PSYCHOTHERAPY: CPT | Performed by: SOCIAL WORKER

## 2023-06-01 PROCEDURE — 64405 NJX AA&/STRD GR OCPL NRV: CPT | Mod: 50 | Performed by: STUDENT IN AN ORGANIZED HEALTH CARE EDUCATION/TRAINING PROGRAM

## 2023-06-01 PROCEDURE — 250N000011 HC RX IP 250 OP 636: Performed by: STUDENT IN AN ORGANIZED HEALTH CARE EDUCATION/TRAINING PROGRAM

## 2023-06-01 RX ORDER — ROPIVACAINE HYDROCHLORIDE 5 MG/ML
4 INJECTION, SOLUTION EPIDURAL; INFILTRATION; PERINEURAL ONCE
Status: COMPLETED | OUTPATIENT
Start: 2023-06-01 | End: 2023-06-01

## 2023-06-01 RX ORDER — TRIAMCINOLONE ACETONIDE 40 MG/ML
40 INJECTION, SUSPENSION INTRA-ARTICULAR; INTRAMUSCULAR ONCE
Status: COMPLETED | OUTPATIENT
Start: 2023-06-01 | End: 2023-06-01

## 2023-06-01 RX ADMIN — ROPIVACAINE HYDROCHLORIDE 4 ML: 5 INJECTION EPIDURAL; INFILTRATION; PERINEURAL at 10:10

## 2023-06-01 RX ADMIN — TRIAMCINOLONE ACETONIDE 40 MG: 40 INJECTION, SUSPENSION INTRA-ARTICULAR; INTRAMUSCULAR at 10:10

## 2023-06-01 ASSESSMENT — PAIN SCALES - PAIN ENJOYMENT GENERAL ACTIVITY SCALE (PEG)
PEG_TOTALSCORE: 8.67
INTERFERED_ENJOYMENT_LIFE: 9
INTERFERED_GENERAL_ACTIVITY: 10
AVG_PAIN_PASTWEEK: 7
INTERFERED_GENERAL_ACTIVITY: 10 - COMPLETELY INTERFERES

## 2023-06-01 ASSESSMENT — PAIN SCALES - GENERAL: PAINLEVEL: EXTREME PAIN (8)

## 2023-06-01 NOTE — GROUP NOTE
Psychotherapy Group Note    PATIENT'S NAME: Randi Cleary  MRN:   [id number]  :   1955  ACCT. NUMBER: [id number]  DATE OF SERVICE: 23  START TIME:  2:00 PM  END TIME:  2:50 PM  FACILITATOR: Orly Durant LICSW  TOPIC: MH EBP Group: Coping Skills  [facility] 55+ Program  TRACK: Clinic 1 55+    NUMBER OF PARTICIPANTS: 3    Summary of Group / Topics Discussed:  Coping Skills: Building Positive Experiences: Patients discussed the importance of planning and engaging in positive experiences, as strategies to increase positive thinking, hope, and self-worth.  Explored the benefits of planning / creating positive experiences, including recognizing and reducing negativity bias by focusing on and building positive experiences.   Several approaches to building positive experiences were presented and discussed relevant to each patient.      Patient Session Goals / Objectives:    Understand the purpose of planning / creating / participating / sharing in positive experiences.    Explore patient s experiences related to negative thinking and how it influences activities and moodIdentify current positive events in patient s life.     Set goals to increase a variety of positive experiences.    Address barriers to planning / engaging in positive experiences.        Patient Participation / Response:  Fully participated with the group by sharing personal reflections / insights and openly received / provided feedback with other participants.    Demonstrated understanding of topics discussed through group discussion and participation    Treatment Plan:  Patient has an initial individualized treatment plan that was created as part of their diagnostic assessment / admission process.  A master individualized treatment plan is in the process of being developed with the patient and multi-disciplinary care team.    ASHKAN Reeves

## 2023-06-01 NOTE — PATIENT INSTRUCTIONS
[facility]  Post Procedure Instructions    Today you had:  Occipital nerve block       Medications used:  ropivacaine  kenalog           Go to the emergency room if you develop any shortness of breath  Monitor the injection sites for signs and symptoms of infection-fever, chills, redness, swelling, warmth, or drainage to areas.  You may have soreness at injection sites for up to 24 hours.  It may take up to 14 days for the steroid medication to start working although you may feel the effect as early as a few days after the procedure.     You may apply ice to the painful areas to help minimize the discomfort of the needle pokes.  Do not apply heat to sites for at least 12 hours.  You may use anti-inflammatory medications or Tylenol for pain control if necessary    Pain Clinic phone number during work hours (Monday through Friday 8 am-4:30 pm) at [phone] or the Provider Line after hours at [phone]:

## 2023-06-01 NOTE — PROGRESS NOTES
Pre procedure Diagnosis: bilateral occipital neuralgia   Post procedure Diagnosis: Same  Procedure performed: bilateral greater occipital nerve blocks, CPT code 85239-03  Anesthesia: none  Complications: none  Operators: Tyson Jackson MD    Indications:   Randi Cleary is a 67 year old female with a history of bilateral occipital neuralgia.    Options/alternatives, benefits and risks were discussed with the patient including bleeding, infection, hematoma, nerve damage, stroke, and spinal cord injury.  Questions were answered to her satisfaction and she agrees to proceed. Voluntary informed consent was obtained and signed.     Vitals were reviewed: Yes  Allergies were reviewed:  Yes   Medications were reviewed:  Yes   Pre-procedure pain score: 8/10    Procedure:  After getting informed consent, a Pause for the Cause was performed.    The occipital ridge, occipital protuberance, and mastoid process were palpated on both sides.  The location of maximal tenderness which was consistent with the location of the bilateral occipital nerves palpated.  The area was cleaned.    Palpation for a pulse was completed, with no pulse noted at the site of the injection.  A 25G, 1.5 inch needle was introduced, aimed cephalad, in the superficial tissues at this area of tenderness.  The injection was completed at this location. Aspiration for heme was negative before all injections.    In total, 4ml of 0.5% ropivacaine and 40mg Kenalog was injected.     The patient tolerated the procedure well, hemostasis was achieved.      Post-procedure pain score: see flowsheet/10  Follow-up includes:   -f/u with referring provider  -schedule prn    Tyson Jackson MD  Interventional Pain Medicine  [facility]

## 2023-06-02 NOTE — GROUP NOTE
"Process Group Note    PATIENT'S NAME: Randi Cleary  MRN:   [id number]  :   1955  ACCT. NUMBER: [id number]  DATE OF SERVICE: 23  START TIME:  1:00 PM  END TIME:  1:50 PM  FACILITATOR: Orly Durant LICSW  TOPIC:  Process Group    Diagnoses:   296.33 (F33.2) Major Depressive Disorder, Recurrent Episode, Severe With anxious distress  300.02 (F41.1) Generalized Anxiety Disorder    Rule out:  296.89 Bipolar II Disorder vs. 314.01 (F90.2) Attention-Deficit/Hyperactivity Disorder   Combined presentation      [facility] 55+ Program  TRACK: Clinic 1 55+    NUMBER OF PARTICIPANTS: 3      Data:    Session content: At the start of this group, patients were invited to check in by identifying themselves, describing their current emotional status, and identifying issues to address in this group.   Area(s) of treatment focus addressed in this session included Symptom Management and Develop / Improve Independent Living Skills.    Therapeutic Interventions/Treatment Strategies:  Psychotherapist offered support, feedback and validation and reinforced use of skills. Treatment modalities used include Cognitive Behavioral Therapy and Dialectical Behavioral Therapy. Interventions include Behavioral Activation: Explored how behaviors effect mood and interact with thoughts and feelings and Encouraged strategies to reduce individual procrastination and increase motivation by increasing goal-directed activities to enhance mood and reduce symptoms., Coping Skills: Facilitated discussion on learning and applying radical acceptance skill and Assisted patient in identifying 1-2 healthy distraction skills to reduce overall distress and Mindfulness: Encouraged a plan to use mindfulness skills in daily life.    Today was patient's first day in group. She reported mood as \"uncomfortable, awkward and self-conscious\" today during check-in. Goal for today has been accomplished already. \"I made it here today.\" " "states she's been up since 5:30 due to appointments and has been going all day.  Barriers are: says she has frequent panic attacks. Skills they will use include: Writer noted successful use of behavior activation to help her mood today when patient said \"I'm glad to get out of the house today.\"  Patient denied substance use, and reports taking medications as prescribed.  Patient deined having safety concerns. Patient feels proud: Stated she did not have anything to feel proud of. Group offered support noting that pt had come to group today and gone to her appointments. Pt was open to this feedback/perspective. Patient declined processing time.      Patient response:   Patient responded to session by accepting feedback, listening, being attentive, accepting support, appearing alert and verbalizing understanding    Possible barriers to participation / learning include: and no barriers identified    Health Issues:   None reported (no new health concerns reported in addition to existing, chronic health issues)       Substance Use Review:   Substance Use: No active concerns identified.    Mental Status/Behavioral Observations  Appearance:   Appropriate   Eye Contact:   Good   Psychomotor Behavior: Normal   Attitude:   Cooperative   Orientation:   All  Speech   Rate / Production: Normal    Volume:  Normal   Mood:    Normal \"happy and worried\"  Affect:    Appropriate   Thought Content:   Clear  Thought Form:  Coherent  Logical     Insight:    Good     Plan:     Safety Plan: No current safety concerns identified.  Recommended that patient call 911 or go to the local ED should there be a change in any of these risk factors.     Barriers to treatment: None identified    Patient Contracts (see media tab):  None    Substance Use: Not addressed in session     Continue or Discharge: Patient will continue in 55+ Program (55+) as planned. Patient is likely to benefit from learning and using skills as they work toward the goals " identified in their treatment plan.      Orly Durant, [facility]  June 2, 2023

## 2023-06-06 ENCOUNTER — VIRTUAL VISIT (OUTPATIENT)
Dept: PSYCHOLOGY | Facility: CLINIC | Age: 68
End: 2023-06-06
Payer: MEDICARE

## 2023-06-06 DIAGNOSIS — F31.81 BIPOLAR 2 DISORDER (H): Primary | ICD-10-CM

## 2023-06-06 DIAGNOSIS — F43.9 TRAUMA AND STRESSOR-RELATED DISORDER: ICD-10-CM

## 2023-06-06 DIAGNOSIS — F41.1 GENERALIZED ANXIETY DISORDER: ICD-10-CM

## 2023-06-06 PROCEDURE — 90837 PSYTX W PT 60 MINUTES: CPT | Mod: VID | Performed by: SOCIAL WORKER

## 2023-06-06 ASSESSMENT — ANXIETY QUESTIONNAIRES
GAD7 TOTAL SCORE: 12
3. WORRYING TOO MUCH ABOUT DIFFERENT THINGS: SEVERAL DAYS
4. TROUBLE RELAXING: MORE THAN HALF THE DAYS
8. IF YOU CHECKED OFF ANY PROBLEMS, HOW DIFFICULT HAVE THESE MADE IT FOR YOU TO DO YOUR WORK, TAKE CARE OF THINGS AT HOME, OR GET ALONG WITH OTHER PEOPLE?: VERY DIFFICULT
5. BEING SO RESTLESS THAT IT IS HARD TO SIT STILL: SEVERAL DAYS
2. NOT BEING ABLE TO STOP OR CONTROL WORRYING: MORE THAN HALF THE DAYS
GAD7 TOTAL SCORE: 12
1. FEELING NERVOUS, ANXIOUS, OR ON EDGE: MORE THAN HALF THE DAYS
7. FEELING AFRAID AS IF SOMETHING AWFUL MIGHT HAPPEN: SEVERAL DAYS
IF YOU CHECKED OFF ANY PROBLEMS ON THIS QUESTIONNAIRE, HOW DIFFICULT HAVE THESE PROBLEMS MADE IT FOR YOU TO DO YOUR WORK, TAKE CARE OF THINGS AT HOME, OR GET ALONG WITH OTHER PEOPLE: VERY DIFFICULT
7. FEELING AFRAID AS IF SOMETHING AWFUL MIGHT HAPPEN: SEVERAL DAYS
6. BECOMING EASILY ANNOYED OR IRRITABLE: NEARLY EVERY DAY
GAD7 TOTAL SCORE: 12

## 2023-06-06 NOTE — PROGRESS NOTES
[facility]                                     Progress Note    Patient Name: Randi Cleary  Date: 6/6/23         Service Type: Individual     Session Start Time: 12:00 PM Session End Time: 12:54 PM     Session Length: 54 minutes    Session #: 180    Attendees: Client attended alone    Service Modality:  Video Visit:      Provider verified identity through the following two step process.  Patient provided:  Patient is known previously to provider    Telemedicine Visit: The patient's condition can be safely assessed and treated via synchronous audio and visual telemedicine encounter.      Reason for Telemedicine Visit: Patient convenience (e.g. access to timely appointments / distance to available provider)    Originating Site (Patient Location): Patient's home    Distant Site (Provider Location): Provider Remote Setting- Home Office    Consent:  The patient/guardian has verbally consented to: the potential risks and benefits of telemedicine (video visit) versus in person care; bill my insurance or make self-payment for services provided; and responsibility for payment of non-covered services.     Patient would like the video invitation sent by:  My Chart    Mode of Communication:  Video Conference via [facility]well    Distant Location (Provider):  Off-site    As the provider I attest to compliance with applicable laws and regulations related to telemedicine.      DATA  Extended Session (53+ minutes): No  Interactive Complexity: No  Crisis: No        Progress Since Last Session (Related to Symptoms / Goals / Homework):   Symptoms: No change since prior session    Homework: Progress made  Follow up with Bre Curry for PT -scheduled  Get to the [facility]  Decide what to do about psychiatry    In the future:  Look at a budget  Get back to the pool  Talk to your primary care doctor about what's on your list (mood, neck, arthritis)       Episode of Care Goals: Satisfactory progress - ACTION (Actively  working towards change); Intervened by reinforcing change plan / affirming steps taken     Current / Ongoing Stressors and Concerns:   Patient is currently socially isolated. She has a conflictual relationship with her .  She is getting minimal physical activity.  She has had several surgeries.      Treatment Objective(s) Addressed in This Session:   Patient will increase frequency of engaging her in ADLs.  Patient will track and record at least 5 pleasant exchanges with . Patient will be able to identify at least 5 positive traits about her .  Patient will reduce level of depressive and anxious features as evidenced by reduction in score on her CHAVO-7 and PHQ-9 (scores of 15 and 16 at first measurment, respectively).     Intervention:   Supportive Therapy:   Processed return to group, with an in person group. Looked at frustrations in social situations and how she is building on them. Looked at how she is advocating for her health lately.      The following assessments were completed by patient for this visit:  PHQ9:       4/7/2023    12:25 PM 4/25/2023    12:02 PM 4/28/2023    12:33 PM 5/5/2023     8:09 AM 5/16/2023     8:39 AM 5/31/2023    10:02 AM 6/6/2023    10:41 AM   PHQ-9 SCORE   PHQ-9 Total Score MyChart 14 (Moderate depression) 11 (Moderate depression) 12 (Moderate depression) 15 (Moderately severe depression) 14 (Moderate depression) 17 (Moderately severe depression) 14 (Moderate depression)   PHQ-9 Total Score 14 11 12 15 14 17 14     GAD7:       3/9/2023     9:27 AM 3/24/2023     9:06 AM 4/7/2023    12:26 PM 4/25/2023    12:02 PM 5/5/2023     8:07 AM 5/22/2023    10:12 AM 6/6/2023    10:44 AM   CHAVO-7 SCORE   Total Score 15 (severe anxiety) 15 (severe anxiety) 10 (moderate anxiety) 10 (moderate anxiety) 11 (moderate anxiety) 10 (moderate anxiety) 12 (moderate anxiety)   Total Score 15    15    15 15    15    15    15 10 10    10 11 10 12     PROMIS 10-Global Health (only subscores and  total score):       1/5/2023    11:28 AM 2/5/2023     9:48 PM 3/9/2023     9:30 AM 4/7/2023    12:28 PM 4/19/2023     8:26 AM 4/25/2023    12:04 PM 5/5/2023     8:12 AM   PROMIS-10 Scores Only   Global Mental Health Score 5    5        5 5    5    5    5    5 6    6    6 7    7    7    7 8    8    8 7    7 6   Global Physical Health Score 8    8        8 8    8    8    8    8 6    6    6 8    8    8    8 7    7    7 10    10 9   PROMIS TOTAL - SUBSCORES 13    13        13 13    13    13    13    13 12    12    12 15    15    15    15 15    15    15 17    17 15       Information is confidential and restricted. Go to Review Flowsheets to unlock data.    Multiple values from one day are sorted in reverse-chronological order        ASSESSMENT: Current Emotional / Mental Status (status of significant symptoms):   Risk status (Self / Other harm or suicidal ideation)   Patient denies current fears or concerns for personal safety.   Patient denies current or recent suicidal ideation or behaviors.   Patient denies current or recent homicidal ideation or behaviors.   Patient denies current or recent self injurious behavior or ideation.   Patient denies other safety concerns.   Patient reports there has been no change in risk factors since their last session.     Patient reports there has been no change in protective factors since their last session.     A safety and risk management plan has been developed including: Patient consented to co-developed safety plan on 11/24/2020, updated 2/20/23.  Safety and risk management plan was reviewed.   Patient agreed to use safety plan should any safety concerns arise.  A copy was made available to the patient.     Appearance:   Appropriate    Eye Contact:   Good    Psychomotor Behavior: Normal    Attitude:   Cooperative    Orientation:   All   Speech    Rate / Production: Normal/ Responsive    Volume:  Normal    Mood:    Frustrated, overwhelmed   Affect:    Appropriate    Thought  Content:  Clear    Thought Form:  Coherent    Insight:    Good      Medication Review:   No changes to current psychiatric medication(s)     Medication Compliance:   Yes     Changes in Health Issues:   None reported     Chemical Use Review:   Substance Use: Chemical use reviewed, no active concerns identified Nothing used since 2021.     Tobacco Use: No current tobacco use.      Diagnosis:  1. Bipolar 2 disorder (H)    2. Generalized anxiety disorder    3. Trauma and stressor-related disorder      Collateral Reports Completed:   Not Applicable    PLAN: (Patient Tasks / Therapist Tasks / Other)  Follow up with Bre Curry for PT  Get to the [facility]  Decide what to do about psychiatry    In the future:  Look at a budget  Get back to the pool  Talk to your primary care doctor about what's on your list (mood, neck, arthritis)        There has been demonstrated improvement in functioning while patient has been engaged in psychotherapy/psychological service- if withdrawn the patient would deteriorate and/or relapse.     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  Relationship with  is conflictual. She is recovering from surgeries  PROMIS (reviewed every 90 days): PROMIS-10 Scores  PROMIS 10-Global Health (only subscores and total score):   PROMIS-10 Scores Only 2021 3/15/2022 3/15/2022  "3/15/2022 3/24/2022 4/1/2022 6/9/2022   Global Mental Health Score 6 6 6 6 8 12 12   Global Physical Health Score 9 9 9 9 8 11 10   PROMIS TOTAL - SUBSCORES 15 15 15 15 16 23 22       Referral / Collaboration:  Referral to another professional/service is not indicated at this time..    Anticipated number of session for this episode of care: 50  Anticipation frequency of session: Biweekly  Anticipated Duration of each session: 53 or more minutes due to intensity of trauma symptoms  Treatment plan will be reviewed in 90 days or when goals have been changed.   There has been demonstrated improvement in functioning while patient has been engaged in psychotherapy/psychological service- if withdrawn the patient would deteriorate and/or relapse.       MeasurableTreatment Goal(s) related to diagnosis / functional impairment(s)  Goal 1: Patient will \"jumpstart, getting going with the things I need to be doing around the house as far as picking up, doing things, trying to do something every day.  Also to lessen the animosity between me and my .\"    I will know I've met my goal when my shoulders are fixed and I can see.      Objective #A (Patient Action)    Patient will increase frequency of engaging her in ADLs.  Status: Continued - Date(s): 12/10/21, 3/9/22, 6/9/22, 9/2/22, 12/1/22, 3/2/23, 6/6/23    Intervention(s)  Therapist will engage patient in CBT, specifically behavioral activation.    Objective #B  Patient will  track and record at least 5 pleasant exchanges with . Patient will be able to identify at least 5 positive traits about her  and how he relates to her.  Status: Continued - Date(s): 12/10/21, 3/9/22, 6/9/22, 9/2/22, 12/1/22, 3/2/23, 6/6/23    Intervention(s)  Therapist will teach assertiveness skills and assign homework related to relationship interactions.    Objective #C  Patient will reduce level of depressive and anxious features as evidenced by reduction in score on her CHAVO-7 and " "PHQ-9 (scores of 15 and 16 at first measurment, respectively).  Status: Continued - Date(s): 12/10/21, 3/9/22, 6/9/22, 9/2/22, 12/1/22, 3/2/23, 6/6/23    Intervention(s)  Therapist will engage patient in person-centered therapy and CBT.    Patient has reviewed and agreed to the above plan.      MICAELA SLADE, [facility]  June 6, 2023                                                   Randi Cleary          SAFETY PLAN:  Step 1: Warning signs / cues (Thoughts, images, mood, situation, behavior) that a crisis may be developing:  ? Thoughts: \"I don't want to continue\" \"I am unwanted\"  ? Images: none  ? Thinking Processes: ruminating  ? Mood: anger  ? Behaviors: isolating/withdrawing , can't stop crying, not taking care of myself and not taking care of my responsibilities  ? Situations: small triggers, such as not being able to find something, or dropping something   Step 2: Coping strategies - Things I can do to take my mind off of my problems without contacting another person (relaxation technique, physical activity):  ? Distress Tolerance Strategies:  arts and crafts: drawing, play with my pet , listen to positive and upbeat music: any, change body temperature (ice pack/cold water)  and paced breathing/progressive muscle relaxation  ? Physical Activities: go for a walk, deep breathing and stretching   ? Focus on helpful thoughts:  \"You've been through this before, you can get through it again.\"  Step 3: People and social settings that provide distraction:                 Name: Carmen                            Name: Darien                           Name: Aleida       ? pool, shopping, Carmen's house, [employer]       Step 4: Remind myself of people and things that are important to me and worth living for:  Clifford Little Donna, post-COVID world, options of what could be in your future        Step 5: When I am in crisis, I can ask these people to help me use my safety plan:                 Name: Sidney  Step 6: Making the " environment safe:   ? go to sleep/daydream  Step 7: Professionals or agencies I can contact during a crisis:  ? [facility] Daytime Number: [phone]  ? Suicide Prevention Lifeline: 6-508-605-EUSJ (6702)  ? Crisis Text Line Service (available 24 hours a day, 7 days a week): Text [place] to 524176    Local Crisis Services: [facility] Crisis: [phone]  Adults can always access to the emPATH unit at [facility] (no phone number, utilize it like an urgent care or ER where you just show up)     Call 911 or go to my nearest emergency department.       I helped develop this safety plan and agree to use it when needed.  I have been given a copy of this plan.       Client signature _________________________________________________________________  Today s date:  11/24/2020  Adapted from Safety Plan Template 2008 Randi Poole and Robby Barba is reprinted with the express permission of the authors.  No portion of the Safety Plan Template may be reproduced without the express, written permission.  You can contact the authors at [email or web address] or [email or web address].

## 2023-06-08 ENCOUNTER — HOSPITAL ENCOUNTER (OUTPATIENT)
Dept: BEHAVIORAL HEALTH | Facility: CLINIC | Age: 68
Discharge: HOME | End: 2023-06-08
Attending: PSYCHIATRY & NEUROLOGY
Payer: MEDICARE

## 2023-06-08 VITALS — SYSTOLIC BLOOD PRESSURE: 145 MMHG | OXYGEN SATURATION: 92 % | HEART RATE: 111 BPM | DIASTOLIC BLOOD PRESSURE: 69 MMHG

## 2023-06-08 DIAGNOSIS — F31.81 BIPOLAR 2 DISORDER (H): Primary | ICD-10-CM

## 2023-06-08 DIAGNOSIS — F41.1 GENERALIZED ANXIETY DISORDER: ICD-10-CM

## 2023-06-08 DIAGNOSIS — M19.90 ARTHRITIS: ICD-10-CM

## 2023-06-08 DIAGNOSIS — R20.0 NUMBNESS IN BOTH HANDS: ICD-10-CM

## 2023-06-08 PROCEDURE — 99215 OFFICE O/P EST HI 40 MIN: CPT | Performed by: PSYCHIATRY & NEUROLOGY

## 2023-06-08 PROCEDURE — 90853 GROUP PSYCHOTHERAPY: CPT | Performed by: SOCIAL WORKER

## 2023-06-08 PROCEDURE — 99417 PROLNG OP E/M EACH 15 MIN: CPT | Performed by: PSYCHIATRY & NEUROLOGY

## 2023-06-08 RX ORDER — GABAPENTIN 100 MG/1
100 CAPSULE ORAL 4 TIMES DAILY PRN
Qty: 120 CAPSULE | Refills: 0 | Status: SHIPPED | OUTPATIENT
Start: 2023-06-08 | End: 2023-07-05

## 2023-06-08 NOTE — PROGRESS NOTES
"[facility] Mental [facility] Outpatient Programs  Provider Interval History Note    Program: Clinic one    PATIENT'S NAME: Randi Cleary  MRN:   [id number]  :   1955  ACCT. NUMBER: [id number]  DATE OF SERVICE: 23    Interval History:  \"I'm tired.\" Winnie presents today for follow-up and ongoing program supervision.   Endorses:    Still struggling with multiple joint difficulties    Has some appointments set with orthopedists    Completed MRI; no need for neurosurgery    Frustrated with delays    More tearful, believes it is alienating friends and family    Reviewed medications, next steps in care    Discussed gabapentin specifically given neuropathies, anxiety, lability  o When taking gabapentin previously it caused drowsiness  - Cannot recall what dose  o Has been taking 's gabapentin 300 mg at bedtime  - Helping with sleep  - Helping with nighttime anxiety  - Very helpful with restless legs  o No other adverse effects from it    Has restarted a number of other psychotropics, including lithium orotate  o Did not tolerate higher doses of lithium carbonate in the past    Symptoms/Systems:    Sleep: improved with gabapentin but poor without medication    Appetite: low    Daily function/ADLs: struggling due to both physical and psychological symptoms     Hygiene: slight decrease    Socialization: few friends, feels she has alienated most of them    Substance use:    Cannabis most days    Safety Assessment:    Suicidal ideation: has passive suicidal thoughts described as \"sometimes I just want to give up,\" denies plan/intent/action    Thoughts of non-suicidal self-injury: denied    Recent self-injurious behavior: denied    Homicidal ideation: denied    Other safety concerns: denied    Medications:  Current Outpatient Medications   Medication Sig Dispense Refill     albuterol (PROVENTIL) (2.5 MG/3ML) 0.083% neb solution Take 1 vial (2.5 mg) by nebulization " every 4 hours as needed for shortness of breath / dyspnea or wheezing 360 mL 5     albuterol (VENTOLIN HFA) 108 (90 Base) MCG/ACT inhaler Inhale 2 puffs into the lungs every 6 hours 18 g 11     Cholecalciferol (VITAMIN D3) 250 MCG (18879 UT) TABS Take 1 tablet by mouth daily 67285/day       Cyanocobalamin (VITAMIN B-12) 5000 MCG SUBL Place 2-3 sprays under the tongue daily Unknown dose. 2 or 3 sprays/day       ethacrynic acid (EDECRIN) 25 MG tablet Take 1 tablet (25 mg) by mouth every other day 45 tablet 3     Fluticasone-Umeclidin-Vilanterol (TRELEGY ELLIPTA) 200-62.5-25 MCG/INH oral inhaler Inhale 1 puff into the lungs daily 4 each 3     gabapentin (NEURONTIN) 100 MG capsule Take 1 capsule (100 mg) by mouth 4 times daily as needed for neuropathic pain (anxiety, sleep) 120 capsule 0     HYDROcodone-acetaminophen (NORCO) 5-325 MG tablet Take 1 tablet by mouth every 6 hours as needed for breakthrough pain or moderate to severe pain 112 tablet 0     hydrOXYzine (ATARAX) 25 MG tablet Take 1 tablet (25 mg) by mouth every 8 hours as needed for anxiety 60 tablet 3     KLOR-CON 20 MEQ CR tablet Take 1 tablet (20 mEq total) by mouth 2 (two) times a day. 180 tablet 0     LITHIUM PO Take 25 mg by mouth daily OTC lithium oratate       magnesium 250 MG tablet Take 1 tablet by mouth 2 times daily       medical cannabis (Patient's own supply) See Admin Instructions (The purpose of this order is to document that the patient reports taking medical cannabis.  This is not a prescription, and is not used to certify that the patient has a qualifying medical condition.)  Flower       methocarbamol (ROBAXIN) 500 MG tablet Take 1 tablet (500 mg) by mouth 3 times daily 90 tablet 3     omeprazole (PRILOSEC) 20 MG DR capsule Take 1 capsule (20 mg) by mouth daily 90 capsule 3     OXcarbazepine (TRILEPTAL) 150 MG tablet One and one-half tab bedtime 45 tablet 3     rOPINIRole (REQUIP) 2 MG tablet One tab 3 pm, one bedtime, and one during night  on waking 90 tablet 3     SYNTHROID 150 MCG tablet Take 1 tablet (150 mcg) by mouth daily 90 tablet 4     vitamin E 400 units TABS Take 800 Units by mouth daily       acetaminophen (TYLENOL) 325 MG tablet Take 2 tablets (650 mg) by mouth every 4 hours as needed for other (For optimal non-opioid multimodal pain management to improve pain control.) (Patient not taking: Reported on 6/8/2023)         The above list was reviewed and updated in EPIC with patient today.     Patient is taking medications as prescribed and denies adverse effects    Laboratory Results:  Most recent labs reviewed. Pertinent updates/findings: None.     Metrics:  PHQ-9 scores:       5/5/2023     8:09 AM 5/16/2023     8:39 AM 5/31/2023    10:02 AM 6/6/2023    10:41 AM   PHQ-9 SCORE   PHQ-9 Total Score MyChart 15 (Moderately severe depression) 14 (Moderate depression) 17 (Moderately severe depression) 14 (Moderate depression)   PHQ-9 Total Score 15 14 17 14       CHAVO-7 scores:       5/5/2023     8:07 AM 5/22/2023    10:12 AM 6/6/2023    10:44 AM   CHAVO-7 SCORE   Total Score 11 (moderate anxiety) 10 (moderate anxiety) 12 (moderate anxiety)   Total Score 11 10 12       CSSR-S: [place] Suicide Severity Rating Scale (Short Version)      8/12/2020     3:00 PM 12/21/2021    11:31 AM 1/9/2023     1:00 PM   [place] Suicide Severity Rating (Short Version)   Over the past 2 weeks have you felt down, depressed, or hopeless? yes yes    Over the past 2 weeks have you had thoughts of killing yourself? no no    Have you ever attempted to kill yourself? no no    Q1 Wished to be Dead (Past Month)   yes   Q2 Suicidal Thoughts (Past Month)   no   Q3 Suicidal Thought Method   no   Q4 Suicidal Intent without Specific Plan   no   Q5 Suicide Intent with Specific Plan   no   Q6 Suicide Behavior (Lifetime)   yes   Within the Past 3 Months?   no   Level of Risk per Screen   moderate risk         Mental Status Examination:  Vital Signs: BP (!) 145/69 (BP Location: Right  "arm, Patient Position: Sitting)   Pulse 111   SpO2 92%    Appearance: appropriately groomed, appears stated age, and in moderate distress.  Attitude: cooperative   Eye Contact: fair   Muscle Strength and Tone: no gross abnormalities   Psychomotor Behavior: restless and fidgety   Gait and Station: antalgic gait; ambulates with wooden cane  Speech: appropriate to affect; regular rate & rhythm  Associations: No loosening of associations and Perseverative  Thought Process: coherent and perseverating  Thought Content: no evidence of psychotic thought, passive suicidal ideation present, no auditory hallucinations present and no visual hallucinations present  Mood: \"depressed\"  Affect: mood congruent, intensity is heightened, labile and reactive  Insight: good  Judgment: intact, adequate for safety  Impulse Control: intact  Oriented to: time, place, person and situation  Attention Span and Concentration: normal  Language: Intact  Recent and Remote Memory: intact to interview. Not formally assessed. No amnesia.  Fund of Knowledge/Assessment of Intelligence: Average  Capacity of Activities of Daily Living: Independent, able to participate in programmatic care services.    Diagnosis/es:    ICD-10-CM    1. Numbness in both hands  R20.0 gabapentin (NEURONTIN) 100 MG capsule      2. Arthritis  M19.90 gabapentin (NEURONTIN) 100 MG capsule      3. Bipolar 2 disorder (H)  F31.81 gabapentin (NEURONTIN) 100 MG capsule      4. Generalized anxiety disorder  F41.1 gabapentin (NEURONTIN) 100 MG capsule          Assessment/Plan:  Winnie presents today for follow up. Endorses continued distress at difficulties getting information about her somatic difficulties. This is naturally contributing to stress and anxiety as well as low mood.    Discussed that patient is actually making good progress in her exploration, and discussed the importance of doing what she can while she waits for appointments, including incorporating more exercise. " Patient has bought a cart that will help her get her belongings to the gym so she can swim more, which will help with mobility, strength, pain, and mood/anxiety.    Adverse reaction to gabapentin reported in chart is a result of sedation at a higher dose. This is desirable at night. We will also try a lower dose on an as-needed basis during the day. Patient reminded not to drive on it but is generally not driving anyway due to pain and neuropathy. Gabapentin should help with that as well. New prescription sent today.     Since lithium orotate is the same elemental lithium as lithium carbonate I anticipate that a higher dose could confer additional benefit. Suggested patient can increase to 50 mg if she desires. No other changes to medication today. Will see once more before discharge from clinic.      Bipolar disorder  o Overall improved   o Some lability still  o Start gabapentin for pain and augmentation of other medication   o Continue clinic      Anxiety   o Overall unchanged  o See above    Continue all other medications as reviewed per electronic medical record today    Continue therapy as planned:    Enrolled in outpatient aftercare clinic    I feel this patient does not meet criteria for an involuntary hold and is appropriate for treatment at an outpatient level of care.    Continue with individual therapist as appropriate    Safety plan reviewed:    To the Emergency Department as needed or call after hours crisis line at [phone] or [phone]. [place] Crisis Text Line: Text [place] to 372689 or Suicide LifeLine Chat: suicidepreventionlifeline.org/chat    Follow-up:     schedule an appointment with me or another program provider in approximately in 4 week(s) or sooner if needed.  Can speak with a staff member or call the appropriate program number (see below) to schedule    Follow up with outpatient provider(s) as planned or sooner if needed for acute medical concerns.    Questions or concerns:    Call  program line with questions or concerns (see below)    MyChart may be used to communicate with your provider, but this is not intended to be used for emergencies.    [facility] Adult Mental Health Program lines:  [facility]: [phone]  Dual Disorder: [phone]  Adult Day Treatment:  [phone]  55+/Intensive Outpatient: [phone]    Community Resources:    National Suicide Prevention Lifeline: 988 from any phone, or 849-895-4199 (TTY: 326.128.2686). Call anytime for help.  (www.suicidepreventionlifeline.org)  National [place] on Mental Illness (www.mary.org): 505.345.4809 or 875-354-4601.   Mental Health Association (www.mentalhealth.org): 786.495.2892 or 299-621-2283.  [place] Crisis Text Line: Text [place] to 200849  Suicide LifeLine Chat: suicideBlockBeacon.org/chat    Treatment Objective(s) Addressed in This Session:  The purpose of today's virtual visit is for this writer to provide oversight of patient's care while receiving program services. Specific treatment goals addressed included personal safety, symptoms stabilization and management, wellness and mental health, and community resources/discharge planning.     This author or another program provider will follow up with the patient as noted above.     Patient agrees with the current plan of care.    Roland Das MD  6/08/23      Visit Details:  Type of service: In-person    Location (patient and provider): [facility] Adult Mental Health Outpatient Programmatic Care Offices        Medical Decision Making  The patient's presentation was of high complexity (a chronic illness severe exacerbation, progression, or side effect of treatment).    The patient's evaluation involved:  discussion of management or test interpretation with another health professional (clinic therapist)    The patient's management necessitated moderate risk (prescription drug management including medications given in the ED).    80 minutes spent on  the date of the encounter doing chart review, patient visit, documentation and discussion with other provider(s)    This document completed in part using Dragon Medical One dictation software.  Please excuse any inadvertent word or phrase substitutions.

## 2023-06-08 NOTE — GROUP NOTE
Psychotherapy Group Note    PATIENT'S NAME: Randi Cleary  MRN:   [id number]  :   1955  ACCT. NUMBER: [id number]  DATE OF SERVICE: 23  START TIME:  2:00 PM  END TIME:  2:50 PM  FACILITATOR: Orly Durant [facility]  TOPIC: MH EBP Group: Coping Skills  [facility] 55+ Program  TRACK: Clinic 1 55+    NUMBER OF PARTICIPANTS: 2    Summary of Group / Topics Discussed:  Coping Skills: Patients discussed and practiced strategies to manage symptoms and stress as they arise. Coping skills are presented to lessen the impact of symptom distress to improve mood and daily functioning.  Patients are able to explore and practice the skills in session and modify to fit their current life circumstances and cultural needs. This top addressed the way that patients can learn to tolerate and manage difficult symptoms and stressors in skillful ways that promote health and wellbeing. They developed the ability to distinguish when these strategies can be useful in their lives for management and stress and psychological well-being.    Patient Session Goals / Objectives:    Understand the purpose of using STOP and TIPP skills when feeling overwhelmed or in a crisis    Identify how STOP can be used in interpersonal conflict in combination with TIPP skill.     Discuss and have group share how they might practice using TIPP or STOP outside of group.      Identify situations in which using a TIPP skill would reduce reactivity/dysregulation and prepare them to use other skills.     Description and therapeutic purpose:  The purpose of Coping Skills is to assist patients in learning about strategies to manage symptoms and stress as they arise.  Coping skills are presented to lessen the impact of symptom distress to improve mood and daily functioning.  Patients are able to explore and practice the skills in session and modify to fit their current life circumstances and cultural needs.  The topics address ways that  patients can learn to tolerate and manage difficult symptoms and stressors in skillful ways that promote health and wellbeing.        Patient Participation / Response:  Fully participated with the group by sharing personal reflections / insights and openly received / provided feedback with other participants.    Demonstrated understanding of topics discussed through group discussion and participation and Demonstrated knowledge of when to consider using a variety of coping skills in daily life    Treatment Plan:  Patient has a current master individualized treatment plan.  See Epic treatment plan for more information.    Orly Durant, SUSANSW

## 2023-06-08 NOTE — PROGRESS NOTES
Acknowledgement of Current Treatment Plan       I have reviewed my treatment plan with my therapist / counselor on 6/8/2023.   I agree with the plan as it is written in the electronic health record. ([facility]inic One 55+)    Name:      Signature:  Randi Cleary   Signed on paper scanned into media   Roland Das MD  Psychiatrist/Medical Director Roland Das MD on 6/9/2023 at 2:31 PM   ASHKAN Prince  Psychotherapist ASHKAN Reeves on 6/8/2023 at 3:23 PM

## 2023-06-08 NOTE — TREATMENT PLAN
"Adult Outpatient Programs  Individualized Treatment Plan       Date of Plan: 2023    Name: Randi Cleary MRN: [id number]    : 1955     Program: Adult Outpatient Programs  Adult Day Treatment Program (ADT) OR 55+ Senior Treatment Program (55+)    Clinical Track: 1 55+  Clinic One after closure of 1 55+    DSM5 Diagnosis:   296.33 (F33.2) Major Depressive Disorder, Recurrent Episode, Severe With anxious distress  300.02 (F41.1) Generalized Anxiety Disorder    Rule out:  296.89 Bipolar II Disorder vs. 314.01 (F90.2) Attention-Deficit/Hyperactivity Disorder   Combined presentation      ADT Multidisciplinary Team Members:  Dr. Roland Das MD  Randi Cleary will participate in the Adult Outpatient Programs Clinic Group (ADT or 55+) 1 day per week, 2 hours per day.   Anticipated duration/discharge: 12 weeks    Due to COVID-19, services will be delivered via telemedicine until further notice.     Program Start Date: 2023  Anticipated Discharge Date: 2023 (pending authorization/clinical changes).    Discharge extended to 23 due to appointments then arriving on the incorrect day for the Clinic One start.    Discharge extended to 23 due to group transfer and ongoing clinical need.    Review Date: Does Randi Cleary continue to meet criteria to participate in the ADT Program, 1 days per week; 2 hours per day?   2023 yes   Discharge  Final date moved to 2023                 Client Strengths:  caring, creative, educated, has a previous history of therapy, insightful, intelligent, motivated and support of family, friends and providers     Client Participation in Plan:  Contributed to goals and plan   Agrees with plan   Received copy of treatment plan     Areas of Vulnerability:  Anxiety  Depressive symptoms   \"Health issues\" (patient's addition)     Long-Term Goals:  Knowledge about illness and management of symptoms   Maintenance of personal safety       Abuse " "Prevention Plan:  Safe, therapeutic environment   Safety coping plan as needed   Education regarding illness and skill development   Coordination with care providers      Discharge Criteria:  Improvement re: identified problems and symptoms   Ability to continue recovery at next level of service   Has a discharge plan in place   Has safety/coping plan in place      Areas of Treatment Focus     Why are you seeking treatment/What do you want to focus on during treatment? \"\"Coping and living with new/existing  mental and physical diagnosis's.  Having many health issues and being on disability since 1998, I'm finding it increasingly difficult coping with daily life and having no quality of life in all areas.\" The problem(s) began \"01/01/98.\" Patient stated that it feels like issues keep adding up since 1998. \"I need to move forward and get on with my life.\" Patient stated she has difficulty managing her health problems and is not communicating her needs well. Her therapist has been helping her identify and work on it. Patient doesn't have PCA or home healthcare provider.\"     Area of Treatment Focus:   Personal Safety  Start Date:    06/08/2023    Goal: Target Date: 7/20/23, discharge Status: Completed  Client will notify staff when needing assistance to develop or implement a coping plan to manage suicidal or self injurious urges.  Client will use coping plan for safety, as needed.    Progress:   7/20 Winnie has reported passive SI occasionally since starting the clinic. Denies intent or plan and is willing and able to take steps to keep herself safe.    9/27/23:  Winnie denied suicidal ideation at discharge.   She uses her coping plan as needed.    Treatment Strategies:  Assess / reassess for appropriate therapy program involvement, encourage participation in therapies  Assess / reassess level of potential for harm to self or others  Engage in safety planning when indicated  Facilitate increased self awareness      Area " "of Treatment Focus:   Symptom Stabilization and Management  Start Date:    06/08/2023    Goal: Target Date: 7/20/23, discharge Status: Completed.  Will report on symptoms and identify 1-3 skills to use to manage anxiety and panic attacks.  Learn and practice at least two skills to decrease reactivity (\"emotion mind\") related to trauma responses. Practice these to improve effectiveness of communications with .     Progress:  7/20- Winnie reports working on improving communication with  and noted some progress and falling back into  old habits  at times (which Writer normalized w/behavior change). Winnie is very open to feedback and coaching in group related to negative self-judgments and dismissing positives    9/27/23:  Winnie continued to work on communication skills with her spouse.   She identified additional ways to manage frustration and to communicate using assertiveness.  She worked on skills to decrease negative self-talk.    Treatment Strategies:  Assist to identify treatment goals  Facilitate increased self awareness  Teach adaptive coping skills and communication skills  Use reality based supportive approach       Area of Treatment Focus:   Community Resources / Support and Discharge Planning  Start Date:    06/08/2023    Goal:   Target Date: 7/20/23, discharge Status: Completed    1- Will develop an aftercare / transition plan by discharge date    2- Follow-up on any referrals provided by Dr. Das. Schedule an appointment w/new psychiatrist. Will call in July when new Residents have started their rotation.    Progress:   7/20- Winnie completed her goal of getting scheduled with a new psychiatrist at the [facility]. Her first appt with a Resident is on 8/2/23.  Current provider list: (will continue with these after discharge)  Dallas Weir, therapist- [facility]  Psychiatry- Dr. Dubois- [facility] [place]  Dr. Harris- Rheumatoid Arthritis specialist  [facility] " [place]  Gerontologist-  9/27/23:  Winnie continued with the above providers.   She missed the 8/2/23 appointment with the resident to establish care and has rescheduled the appointment.  Her primary care provider will refill medications in the meantime.    Treatment Strategies:   Assist clients in establishing / strengthening support network  Assist with discharge planning       Orly Durant, [facility]    NOTE: Signatures are available on the Acknowledgement of Treatment Plan located in Chart Review    The Individualized Treatment Plan Signature Page has been routed to the provider for co-sign.     I have reviewed the patient's Individualized Treatment Plan and agree with the current goals, interventions and level of care.     Roland Das MD  10/26/2023

## 2023-06-09 ENCOUNTER — MEDICAL CORRESPONDENCE (OUTPATIENT)
Dept: NEUROSURGERY | Facility: CLINIC | Age: 68
End: 2023-06-09
Payer: MEDICARE

## 2023-06-09 NOTE — GROUP NOTE
"Process Group Note    PATIENT'S NAME: Randi Cleary  MRN:   [id number]  :   1955  ACCT. NUMBER: [id number]  DATE OF SERVICE: 23  START TIME:  1:00 PM  END TIME:  1:50 PM  FACILITATOR: Orly Durant LICSW  TOPIC:  Process Group    Diagnoses:   296.33 (F33.2) Major Depressive Disorder, Recurrent Episode, Severe With anxious distress  300.02 (F41.1) Generalized Anxiety Disorder    Rule out:  296.89 Bipolar II Disorder vs. 314.01 (F90.2) Attention-Deficit/Hyperactivity Disorder   Combined presentation      [facility] 55+ Program  TRACK: Clinic 1 55+    NUMBER OF PARTICIPANTS: 2        Data:    Session content: At the start of this group, patients were invited to check in by identifying themselves, describing their current emotional status, and identifying issues to address in this group.   Area(s) of treatment focus addressed in this session included Symptom Management, Develop / Improve Independent Living Skills and Develop Socialization / Interpersonal Relationship Skills.    Patient responded to question \"how are you feeling today?\" by talking about \"not good yesterday\" and began talking about what happened. This led into her talking at some length about her history of medical trauma and how it impacts her today, and her frustrations with current providers treating her hand, shoulder, etc. Writer redirected pt to today and she reported feeling \"anxious\". Pt does not follow the check-in sheet questions and Writer redirected her back to the list to make sure questions about SI/SA/Rx taking were covered.     Pt continues to attack herself with harsh self-judgments and dismissing any positives. Writer redirected pt when expressing gratitude, she said about her  \"We were communicating better, but..\" Writer encouraged using \"and\". For example, suggested saying, 'Were were communicating better AND yesterday we fell back into old habits.' Pt talked about how \"I come out with " "anger, yelling.\" Writer noted how trauma impacts perception of threats (which Writer discussed more in the next group in context of TIPP and STOP skills). Pt presented as better regulated by the end of the second group.     Therapeutic Interventions/Treatment Strategies:  Psychotherapist offered support, feedback and validation and reinforced use of skills. Treatment modalities used include Cognitive Behavioral Therapy and Dialectical Behavioral Therapy. Interventions include Cognitive Restructuring:  Explored impact of ineffective thoughts / distortions on mood and activity and Assisted patient in formulating new neutral/positive alternatives to challenge less helpful / ineffective thoughts, Emotions Management:  Reinforced the purpose and biological basis of emotions and Reviewed opposite action skill and Relationship Skills: Encouraged development and maintenance  of healthy boundaries and Discussed relationships and ways to reduce conflict .    Assessment:    Patient response:   Patient responded to session by accepting feedback, giving feedback, listening, being attentive, accepting support and appearing alert    Possible barriers to participation / learning include: Pt arrived late to group 20 minutes late. Pt apologized and said she would leave even earlier, citing road construction as a barrier.     Health Issues:   Yes: Pain, Associated Psychological Distress       Substance Use Review:   Substance Use: No active concerns identified.    Mental Status/Behavioral Observations  Appearance:   Appropriate   Eye Contact:   Good   Psychomotor Behavior: Normal   Attitude:   Cooperative  Interested  Orientation:   All  Speech   Rate / Production: Normal    Volume:  Normal   Mood:    Anxious  Depressed  Fearful  Affect:    Appropriate  Tearful  Thought Content:   Clear  Thought Form:  Coherent  Tangential     Insight:    Fair     Plan:     Safety Plan: No current safety concerns identified.  Recommended that patient " call 911 or go to the local ED should there be a change in any of these risk factors.     Barriers to treatment: None identified    Patient Contracts (see media tab):  None    Substance Use: Not addressed in session     Continue or Discharge: Patient will continue in 55+ Program (55+) as planned. Patient is likely to benefit from learning and using skills as they work toward the goals identified in their treatment plan.      Orly Durant, [facility]  June 9, 2023

## 2023-06-11 ENCOUNTER — HEALTH MAINTENANCE LETTER (OUTPATIENT)
Age: 68
End: 2023-06-11

## 2023-06-12 ENCOUNTER — TRANSFERRED RECORDS (OUTPATIENT)
Dept: HEALTH INFORMATION MANAGEMENT | Facility: CLINIC | Age: 68
End: 2023-06-12
Payer: MEDICARE

## 2023-06-13 ENCOUNTER — VIRTUAL VISIT (OUTPATIENT)
Dept: PSYCHOLOGY | Facility: CLINIC | Age: 68
End: 2023-06-13
Payer: MEDICARE

## 2023-06-13 DIAGNOSIS — F31.81 BIPOLAR 2 DISORDER (H): Primary | ICD-10-CM

## 2023-06-13 DIAGNOSIS — F41.1 GENERALIZED ANXIETY DISORDER: ICD-10-CM

## 2023-06-13 DIAGNOSIS — F43.9 TRAUMA AND STRESSOR-RELATED DISORDER: ICD-10-CM

## 2023-06-13 PROCEDURE — 90837 PSYTX W PT 60 MINUTES: CPT | Mod: VID | Performed by: SOCIAL WORKER

## 2023-06-13 NOTE — PROGRESS NOTES
[facility]                                     Progress Note    Patient Name: Randi Cleary  Date: 6/13/23         Service Type: Individual     Session Start Time: 12:02 PM Session End Time: 1:00 PM     Session Length: 58 minutes    Session #: 181    Attendees: Client attended alone    Service Modality:  Video Visit:      Provider verified identity through the following two step process.  Patient provided:  Patient is known previously to provider    Telemedicine Visit: The patient's condition can be safely assessed and treated via synchronous audio and visual telemedicine encounter.      Reason for Telemedicine Visit: Patient convenience (e.g. access to timely appointments / distance to available provider)    Originating Site (Patient Location): Patient's home    Distant Site (Provider Location): [facility] [place]    Consent:  The patient/guardian has verbally consented to: the potential risks and benefits of telemedicine (video visit) versus in person care; bill my insurance or make self-payment for services provided; and responsibility for payment of non-covered services.     Patient would like the video invitation sent by:  My Chart    Mode of Communication:  Video Conference via [facility]    Distant Location (Provider):  On-site    As the provider I attest to compliance with applicable laws and regulations related to telemedicine.      DATA  Extended Session (53+ minutes): No  Interactive Complexity: No  Crisis: No        Progress Since Last Session (Related to Symptoms / Goals / Homework):   Symptoms: Improving Some more hope    Homework: Progress made  Follow up with Bre Curry for PT  Get to the [facility]  Decide what to do about psychiatry       Episode of Care Goals: Satisfactory progress - ACTION (Actively working towards change); Intervened by reinforcing change plan / affirming steps taken     Current / Ongoing Stressors and  Concerns:   Patient is currently socially isolated. She has a conflictual relationship with her .  She is getting minimal physical activity.  She has had several surgeries.      Treatment Objective(s) Addressed in This Session:   Patient will increase frequency of engaging her in ADLs.  Patient will track and record at least 5 pleasant exchanges with . Patient will be able to identify at least 5 positive traits about her .  Patient will reduce level of depressive and anxious features as evidenced by reduction in score on her CHAVO-7 and PHQ-9 (scores of 15 and 16 at first measurment, respectively).     Intervention:   Supportive Therapy:  Processed appointment patient had and the impact of this on her. Talked about her next steps and how to care for herself in doing so. Looked at how to prioritize her self care.          The following assessments were completed by patient for this visit:  PHQ9:       4/7/2023    12:25 PM 4/25/2023    12:02 PM 4/28/2023    12:33 PM 5/5/2023     8:09 AM 5/16/2023     8:39 AM 5/31/2023    10:02 AM 6/6/2023    10:41 AM   PHQ-9 SCORE   [facility]9 [facility] 14 (Moderate depression) 11 (Moderate depression) 12 (Moderate depression) 15 (Moderately severe depression) 14 (Moderate depression) 17 (Moderately severe depression) 14 (Moderate depression)   PHQ-9 Total Score 14 11 12 15 14 17 14     GAD7:       3/9/2023     9:27 AM 3/24/2023     9:06 AM 4/7/2023    12:26 PM 4/25/2023    12:02 PM 5/5/2023     8:07 AM 5/22/2023    10:12 AM 6/6/2023    10:44 AM   CHAVO-7 SCORE   Total Score 15 (severe anxiety) 15 (severe anxiety) 10 (moderate anxiety) 10 (moderate anxiety) 11 (moderate anxiety) 10 (moderate anxiety) 12 (moderate anxiety)   Total Score 15    15    15 15    15    15    15 10 10    10 11 10 12     PROMIS 10-Global Health (only subscores and total score):       2/5/2023     9:48 PM 3/9/2023     9:30 AM 4/7/2023    12:28 PM 4/19/2023     8:26 AM 4/25/2023    12:04 PM  5/5/2023     8:12 AM 6/8/2023    12:05 PM   PROMIS-10 Scores Only   Global Mental Health Score 5    5    5    5    5 6    6    6 7    7    7    7 8    8    8 7    7 6 6    6   Global Physical Health Score 8    8    8    8    8 6    6    6 8    8    8    8 7    7    7 10    10 9 9    9   PROMIS TOTAL - SUBSCORES 13    13    13    13    13 12    12    12 15    15    15    15 15    15    15 17    17 15 15    15        ASSESSMENT: Current Emotional / Mental Status (status of significant symptoms):   Risk status (Self / Other harm or suicidal ideation)   Patient denies current fears or concerns for personal safety.   Patient denies current or recent suicidal ideation or behaviors.   Patient denies current or recent homicidal ideation or behaviors.   Patient denies current or recent self injurious behavior or ideation.   Patient denies other safety concerns.   Patient reports there has been no change in risk factors since their last session.     Patient reports there has been no change in protective factors since their last session.     A safety and risk management plan has been developed including: Patient consented to co-developed safety plan on 11/24/2020, updated 2/20/23.  Safety and risk management plan was reviewed.   Patient agreed to use safety plan should any safety concerns arise.  A copy was made available to the patient.     Appearance:   Appropriate    Eye Contact:   Good    Psychomotor Behavior: Normal    Attitude:   Cooperative    Orientation:   All   Speech    Rate / Production: Normal/ Responsive    Volume:  Normal    Mood:    Frustrated, overwhelmed   Affect:    Appropriate    Thought Content:  Clear    Thought Form:  Coherent    Insight:    Good      Medication Review:   No changes to current psychiatric medication(s)     Medication Compliance:   Yes     Changes in Health Issues:   None reported     Chemical Use Review:   Substance Use: Chemical use reviewed, no active concerns identified Nothing used  since 2021.     Tobacco Use: No current tobacco use.      Diagnosis:  1. Bipolar 2 disorder (H)    2. Generalized anxiety disorder    3. Trauma and stressor-related disorder      Collateral Reports Completed:   Not Applicable    PLAN: (Patient Tasks / Therapist Tasks / Other)  Follow up with Bre Curry for PT  Get to the [facility]  Decide what to do about psychiatry    In the future:  Look at a budget      There has been demonstrated improvement in functioning while patient has been engaged in psychotherapy/psychological service- if withdrawn the patient would deteriorate and/or relapse.     MICAELA SLADE, [facility]   2023                                                        ______________________________________________________________________    Individual Treatment Plan    Patient's Name: Randi Cleary  YOB: 1955    Date of Creation: 20  Date Treatment Plan Last Reviewed/Revised: 23    DSM5 Diagnoses: 296.89 Bipolar II Disorder Depressed, 300.02 (F41.1) Generalized Anxiety Disorder or Adjustment Disorders  309.89 (F43.8) Other Specified Trauma and Stressor Related Disorder  Psychosocial / Contextual Factors: Patient's entire family of origin has , she now has a sister-in-law and  as support.  Relationship with  is conflictual. She is recovering from surgeries  PROMIS (reviewed every 90 days): PROMIS-10 Scores  PROMIS 10-Global Health (only subscores and total score):   PROMIS-10 Scores Only 2021 3/15/2022 3/15/2022 3/15/2022 3/24/2022 2022 2022   Global Mental Health Score 6 6 6 6 8 12 12   Global Physical Health Score 9 9 9 9 8 11 10   PROMIS TOTAL - SUBSCORES 15 15 15 15 16 23 22       Referral / Collaboration:  Referral to another professional/service is not indicated at this time..    Anticipated number of session for this episode of care: 50  Anticipation frequency of session: Biweekly  Anticipated Duration of each session: 53  "or more minutes due to intensity of trauma symptoms  Treatment plan will be reviewed in 90 days or when goals have been changed.   There has been demonstrated improvement in functioning while patient has been engaged in psychotherapy/psychological service- if withdrawn the patient would deteriorate and/or relapse.       MeasurableTreatment Goal(s) related to diagnosis / functional impairment(s)  Goal 1: Patient will \"jumpstart, getting going with the things I need to be doing around the house as far as picking up, doing things, trying to do something every day.  Also to lessen the animosity between me and my .\"    I will know I've met my goal when my shoulders are fixed and I can see.      Objective #A (Patient Action)    Patient will increase frequency of engaging her in ADLs.  Status: Continued - Date(s): 12/10/21, 3/9/22, 6/9/22, 9/2/22, 12/1/22, 3/2/23, 6/6/23    Intervention(s)  Therapist will engage patient in CBT, specifically behavioral activation.    Objective #B  Patient will  track and record at least 5 pleasant exchanges with . Patient will be able to identify at least 5 positive traits about her  and how he relates to her.  Status: Continued - Date(s): 12/10/21, 3/9/22, 6/9/22, 9/2/22, 12/1/22, 3/2/23, 6/6/23    Intervention(s)  Therapist will teach assertiveness skills and assign homework related to relationship interactions.    Objective #C  Patient will reduce level of depressive and anxious features as evidenced by reduction in score on her CHAVO-7 and PHQ-9 (scores of 15 and 16 at first measurment, respectively).  Status: Continued - Date(s): 12/10/21, 3/9/22, 6/9/22, 9/2/22, 12/1/22, 3/2/23, 6/6/23    Intervention(s)  Therapist will engage patient in person-centered therapy and CBT.    Patient has reviewed and agreed to the above plan.      MICAELA SLADE, [facility]  June 6, 2023                                                   Randi Cleary          SAFETY PLAN:  Step 1: " "Warning signs / cues (Thoughts, images, mood, situation, behavior) that a crisis may be developing:  ? Thoughts: \"I don't want to continue\" \"I am unwanted\"  ? Images: none  ? Thinking Processes: ruminating  ? Mood: anger  ? Behaviors: isolating/withdrawing , can't stop crying, not taking care of myself and not taking care of my responsibilities  ? Situations: small triggers, such as not being able to find something, or dropping something   Step 2: Coping strategies - Things I can do to take my mind off of my problems without contacting another person (relaxation technique, physical activity):  ? Distress Tolerance Strategies:  arts and crafts: drawing, play with my pet , listen to positive and upbeat music: any, change body temperature (ice pack/cold water)  and paced breathing/progressive muscle relaxation  ? Physical Activities: go for a walk, deep breathing and stretching   ? Focus on helpful thoughts:  \"You've been through this before, you can get through it again.\"  Step 3: People and social settings that provide distraction:                 Name: Carmen                            Name: Darien                           Name: Aleida       ? pool, shopping, Carmen's house, [employer]       Step 4: Remind myself of people and things that are important to me and worth living for:  Clifford Little Donna, post-COVID world, options of what could be in your future        Step 5: When I am in crisis, I can ask these people to help me use my safety plan:                 Name: Sidney  Step 6: Making the environment safe:   ? go to sleep/daydream  Step 7: Professionals or agencies I can contact during a crisis:  ? [facility] Daytime Number: 516-529-1730  ? Suicide Prevention Lifeline: 8-792-665-TALK (1517)  ? Crisis Text Line Service (available 24 hours a day, 7 days a week): Text [place] to 478374    Local Crisis Services: [facility] Crisis: [phone]  Adults can always access to the emPATH unit at [facility] " Richar Marinelli (no phone number, utilize it like an urgent care or ER where you just show up)     Call 911 or go to my nearest emergency department.       I helped develop this safety plan and agree to use it when needed.  I have been given a copy of this plan.       Client signature _________________________________________________________________  Today s date:  11/24/2020  Adapted from Safety Plan Template 2008 Randi Poole and Robby Barba is reprinted with the express permission of the authors.  No portion of the Safety Plan Template may be reproduced without the express, written permission.  You can contact the authors at [email or web address] or [email or web address].

## 2023-06-15 ENCOUNTER — HOSPITAL ENCOUNTER (OUTPATIENT)
Dept: BEHAVIORAL HEALTH | Facility: CLINIC | Age: 68
Discharge: HOME | End: 2023-06-15
Attending: PSYCHIATRY & NEUROLOGY
Payer: MEDICARE

## 2023-06-15 PROCEDURE — 90853 GROUP PSYCHOTHERAPY: CPT | Performed by: SOCIAL WORKER

## 2023-06-15 NOTE — GROUP NOTE
Psychotherapy Group Note    PATIENT'S NAME: Randi Cleary  MRN:   [id number]  :   1955  ACCT. NUMBER: [id number]  DATE OF SERVICE: 6/15/23  START TIME:  2:00 PM  END TIME:  2:50 PM  FACILITATOR: Orly Durant LICSW  TOPIC: MH EBP Group: Coping Skills  [facility] 55+ Program  TRACK: Clinic 1    NUMBER OF PARTICIPANTS: 3    Summary of Group / Topics Discussed:  Coping Skills: Improve the Moment: Patients learned to tolerate distress, by applying strategies to effect positive change in the present moment.  Patients will identified situations where they would benefit from applying strategies to improve the moment and reduce distress. Patients discussed how to distinguish when this can be useful in their lives or when other strategies would be more relevant or helpful.    Patient Session Goals / Objectives:    Discuss how the use of intentional  in the moment  actions can help reduce distress.    Review patients current practices and discuss a more formal way of practicing and accessing skills.    Increase ability to decide when to use improve the moment strategies    Choose 1-2 in the moment actions to apply during times of distress.        Patient Participation / Response:  Fully participated with the group by sharing personal reflections / insights and openly received / provided feedback with other participants.    Demonstrated understanding of topics discussed through group discussion and participation    Treatment Plan:  Patient has a current master individualized treatment plan.  See Epic treatment plan for more information.    ASHKAN Reeves

## 2023-06-15 NOTE — PROGRESS NOTES
Acknowledgement of Current Treatment Plan       I have reviewed my treatment plan with my therapist / counselor on 6/08/2023.   I agree with the plan as it is written in the electronic health record. (Clinic 1, 55+)    Name:      Signature:  Randi Cleary   Signed on paper, scanned into media   Roland Das MD  Psychiatrist/Medical Director Signeded on paper, scanned into media   ASHKAN Prince  Psychotherapist Signeded on paper, scanned into media    ASHKAN Prince 6/08/2023

## 2023-06-16 ENCOUNTER — VIRTUAL VISIT (OUTPATIENT)
Dept: PSYCHOLOGY | Facility: CLINIC | Age: 68
End: 2023-06-16
Payer: MEDICARE

## 2023-06-16 DIAGNOSIS — F41.1 GENERALIZED ANXIETY DISORDER: ICD-10-CM

## 2023-06-16 DIAGNOSIS — F43.9 TRAUMA AND STRESSOR-RELATED DISORDER: ICD-10-CM

## 2023-06-16 DIAGNOSIS — F31.81 BIPOLAR 2 DISORDER (H): Primary | ICD-10-CM

## 2023-06-16 PROCEDURE — 90837 PSYTX W PT 60 MINUTES: CPT | Mod: VID | Performed by: SOCIAL WORKER

## 2023-06-16 NOTE — GROUP NOTE
Process Group Note    PATIENT'S NAME: Randi Cleary  MRN:   [id number]  :   1955  ACCT. NUMBER: [id number]  DATE OF SERVICE: 6/15/23  START TIME:  1:00 PM  END TIME:  1:50 PM  FACILITATOR: Orly Durant [facility]  TOPIC:  Process Group    Diagnoses:  296.33 (F33.2) Major Depressive Disorder, Recurrent Episode, Severe With anxious distress  300.02 (F41.1) Generalized Anxiety Disorder    Rule out:  296.89 Bipolar II Disorder vs. 314.01 (F90.2) Attention-Deficit/Hyperactivity Disorder   Combined presentation      [facility] 55+ Program  TRACK: Clinic 1     NUMBER OF PARTICIPANTS: 3        Data:     Session content: At the start of this group, patients were invited to check in by identifying themselves, describing their current emotional status, and identifying issues to address in this group.   Area(s) of treatment focus addressed in this session included Develop / Improve Independent Living Skills and Develop Socialization / Interpersonal Relationship Skills.     Therapeutic Interventions/Treatment Strategies:  Psychotherapist offered support, feedback and validation and reinforced use of skills. Treatment modalities used include Cognitive Behavioral Therapy and Dialectical Behavioral Therapy. Interventions include Behavioral Activation: Explored how behaviors effect mood and interact with thoughts and feelings, Cognitive Restructuring:  Assisted patient in identifying new neutral/positive core beliefs and Relationship Skills: Discussed strategies to promote healthier understanding of interpersonal relationships.      Assessment:    Patient response:   Patient responded to session by accepting feedback, giving feedback, listening, being attentive, accepting support and appearing alert    Possible barriers to participation / learning include: and no barriers identified    Health Issues:   None reported       Substance Use Review:   Substance Use: No active concerns identified.    Mental  Status/Behavioral Observations  Appearance:   Appropriate   Eye Contact:   Good   Psychomotor Behavior: Normal   Attitude:   Cooperative  Pleasant  Orientation:   All  Speech   Rate / Production: Normal    Volume:  Normal   Mood:    Anxious  Normal  Affect:    Appropriate   Thought Content:   Clear  Thought Form:  Coherent  Logical     Insight:    Good     Plan:     Safety Plan: No current safety concerns identified.  Recommended that patient call 911 or go to the local ED should there be a change in any of these risk factors.     Barriers to treatment: None identified    Patient Contracts (see media tab):  None    Substance Use: Not addressed in session     Continue or Discharge: Patient will continue in 55+ Program (55+) as planned. Patient is likely to benefit from learning and using skills as they work toward the goals identified in their treatment plan.      Orly Durant, [facility]  June 16, 2023

## 2023-06-16 NOTE — PROGRESS NOTES
[facility]                                     Progress Note    Patient Name: Randi Cleary  Date: 6/16/23         Service Type: Individual     Session Start Time: 12:32 PM Session End Time: 1:28 PM     Session Length: 56 minutes    Session #: 182    Attendees: Client attended alone    Service Modality:  Video Visit:      Provider verified identity through the following two step process.  Patient provided:  Patient is known previously to provider    Telemedicine Visit: The patient's condition can be safely assessed and treated via synchronous audio and visual telemedicine encounter.      Reason for Telemedicine Visit: Patient convenience (e.g. access to timely appointments / distance to available provider)    Originating Site (Patient Location): Patient's home    Distant Site (Provider Location): Provider Remote Setting- Home Office    Consent:  The patient/guardian has verbally consented to: the potential risks and benefits of telemedicine (video visit) versus in person care; bill my insurance or make self-payment for services provided; and responsibility for payment of non-covered services.     Patient would like the video invitation sent by:  My Chart    Mode of Communication:  Video Conference via [facility]    Distant Location (Provider):  Off-site    As the provider I attest to compliance with applicable laws and regulations related to telemedicine.      DATA  Extended Session (53+ minutes): No  Interactive Complexity: No  Crisis: No        Progress Since Last Session (Related to Symptoms / Goals / Homework):   Symptoms: Some worries around her recent medical appointment that are leading to self-doubt    Homework: Progress made  Follow up with Bre Curry for PT  Get to the [facility]  Decide what to do about psychiatry-done         Episode of Care Goals: Satisfactory progress - ACTION (Actively working towards change); Intervened by reinforcing change plan / affirming steps  taken     Current / Ongoing Stressors and Concerns:   Patient is currently socially isolated. She has a conflictual relationship with her .  She is getting minimal physical activity.  She has had several surgeries.      Treatment Objective(s) Addressed in This Session:   Patient will increase frequency of engaging her in ADLs.  Patient will track and record at least 5 pleasant exchanges with . Patient will be able to identify at least 5 positive traits about her .  Patient will reduce level of depressive and anxious features as evidenced by reduction in score on her CHAVO-7 and PHQ-9 (scores of 15 and 16 at first measurment, respectively).     Intervention:   Supportive Therapy:  Discussed concerns about challenges with her recent appointment and the doubts she now has. Talked about how she could follow up and what this might mean to her. Explored thoughts and beliefs she has around this and explored alternative beliefs. Reviewed homework and identified successes and barriers to completion.      The following assessments were completed by patient for this visit:  PHQ9:       4/7/2023    12:25 PM 4/25/2023    12:02 PM 4/28/2023    12:33 PM 5/5/2023     8:09 AM 5/16/2023     8:39 AM 5/31/2023    10:02 AM 6/6/2023    10:41 AM   PHQ-9 SCORE   PHQ-9 Total Score MyChart 14 (Moderate depression) 11 (Moderate depression) 12 (Moderate depression) 15 (Moderately severe depression) 14 (Moderate depression) 17 (Moderately severe depression) 14 (Moderate depression)   PHQ-9 Total Score 14 11 12 15 14 17 14     GAD7:       3/9/2023     9:27 AM 3/24/2023     9:06 AM 4/7/2023    12:26 PM 4/25/2023    12:02 PM 5/5/2023     8:07 AM 5/22/2023    10:12 AM 6/6/2023    10:44 AM   CHAVO-7 SCORE   Total Score 15 (severe anxiety) 15 (severe anxiety) 10 (moderate anxiety) 10 (moderate anxiety) 11 (moderate anxiety) 10 (moderate anxiety) 12 (moderate anxiety)   Total Score 15    15    15 15    15    15    15 10 10    10 11 10  12     PROMIS 10-Global Health (only subscores and total score):       2/5/2023     9:48 PM 3/9/2023     9:30 AM 4/7/2023    12:28 PM 4/19/2023     8:26 AM 4/25/2023    12:04 PM 5/5/2023     8:12 AM 6/8/2023    12:05 PM   PROMIS-10 Scores Only   Global Mental Health Score 5    5    5    5    5 6    6    6 7    7    7    7 8    8    8 7    7 6 6    6   Global Physical Health Score 8    8    8    8    8 6    6    6 8    8    8    8 7    7    7 10    10 9 9    9   PROMIS TOTAL - SUBSCORES 13    13    13    13    13 12    12    12 15    15    15    15 15    15    15 17    17 15 15    15        ASSESSMENT: Current Emotional / Mental Status (status of significant symptoms):   Risk status (Self / Other harm or suicidal ideation)   Patient denies current fears or concerns for personal safety.   Patient denies current or recent suicidal ideation or behaviors.   Patient denies current or recent homicidal ideation or behaviors.   Patient denies current or recent self injurious behavior or ideation.   Patient denies other safety concerns.   Patient reports there has been no change in risk factors since their last session.     Patient reports there has been no change in protective factors since their last session.     A safety and risk management plan has been developed including: Patient consented to co-developed safety plan on 11/24/2020, updated 2/20/23.  Safety and risk management plan was reviewed.   Patient agreed to use safety plan should any safety concerns arise.  A copy was made available to the patient.     Appearance:   Appropriate    Eye Contact:   Good    Psychomotor Behavior: Normal    Attitude:   Cooperative    Orientation:   All   Speech    Rate / Production: Normal/ Responsive    Volume:  Normal    Mood:    Anxious    Affect:    Appropriate    Thought Content:  Clear    Thought Form:  Coherent    Insight:    Good      Medication Review:   No changes to current psychiatric medication(s)     Medication  Compliance:   Yes     Changes in Health Issues:   None reported     Chemical Use Review:   Substance Use: Chemical use reviewed, no active concerns identified Nothing used since 2021.     Tobacco Use: No current tobacco use.      Diagnosis:  1. Bipolar 2 disorder (H)    2. Generalized anxiety disorder    3. Trauma and stressor-related disorder      Collateral Reports Completed:   Not Applicable    PLAN: (Patient Tasks / Therapist Tasks / Other)  If you decide to approach the staff about your concern, remember to focus it on how you feel    In the future:  Look at a budget      There has been demonstrated improvement in functioning while patient has been engaged in psychotherapy/psychological service- if withdrawn the patient would deteriorate and/or relapse.     MICAELA SLADE, [facility]   2023                                                        ______________________________________________________________________    Individual Treatment Plan    Patient's Name: Randi Cleary  YOB: 1955    Date of Creation: 20  Date Treatment Plan Last Reviewed/Revised: 23    DSM5 Diagnoses: 296.89 Bipolar II Disorder Depressed, 300.02 (F41.1) Generalized Anxiety Disorder or Adjustment Disorders  309.89 (F43.8) Other Specified Trauma and Stressor Related Disorder  Psychosocial / Contextual Factors: Patient's entire family of origin has , she now has a sister-in-law and  as support.  Relationship with  is conflictual. She is recovering from surgeries  PROMIS (reviewed every 90 days): PROMIS-10 Scores  PROMIS 10-Global Health (only subscores and total score):   PROMIS-10 Scores Only 2021 3/15/2022 3/15/2022 3/15/2022 3/24/2022 2022 2022   Global Mental Health Score 6 6 6 6 8 12 12   Global Physical Health Score 9 9 9 9 8 11 10   PROMIS TOTAL - SUBSCORES 15 15 15 15 16 23 22       Referral / Collaboration:  Referral to another professional/service is not  "indicated at this time..    Anticipated number of session for this episode of care: 50  Anticipation frequency of session: Biweekly  Anticipated Duration of each session: 53 or more minutes due to intensity of trauma symptoms  Treatment plan will be reviewed in 90 days or when goals have been changed.   There has been demonstrated improvement in functioning while patient has been engaged in psychotherapy/psychological service- if withdrawn the patient would deteriorate and/or relapse.       MeasurableTreatment Goal(s) related to diagnosis / functional impairment(s)  Goal 1: Patient will \"jumpstart, getting going with the things I need to be doing around the house as far as picking up, doing things, trying to do something every day.  Also to lessen the animosity between me and my .\"    I will know I've met my goal when my shoulders are fixed and I can see.      Objective #A (Patient Action)    Patient will increase frequency of engaging her in ADLs.  Status: Continued - Date(s): 12/10/21, 3/9/22, 6/9/22, 9/2/22, 12/1/22, 3/2/23, 6/6/23    Intervention(s)  Therapist will engage patient in CBT, specifically behavioral activation.    Objective #B  Patient will  track and record at least 5 pleasant exchanges with . Patient will be able to identify at least 5 positive traits about her  and how he relates to her.  Status: Continued - Date(s): 12/10/21, 3/9/22, 6/9/22, 9/2/22, 12/1/22, 3/2/23, 6/6/23    Intervention(s)  Therapist will teach assertiveness skills and assign homework related to relationship interactions.    Objective #C  Patient will reduce level of depressive and anxious features as evidenced by reduction in score on her CHAVO-7 and PHQ-9 (scores of 15 and 16 at first measurment, respectively).  Status: Continued - Date(s): 12/10/21, 3/9/22, 6/9/22, 9/2/22, 12/1/22, 3/2/23, 6/6/23    Intervention(s)  Therapist will engage patient in person-centered therapy and CBT.    Patient has reviewed " "and agreed to the above plan.      MICAELA SLADE, [facility]  June 6, 2023                                                   Randi Cleary          SAFETY PLAN:  Step 1: Warning signs / cues (Thoughts, images, mood, situation, behavior) that a crisis may be developing:  ? Thoughts: \"I don't want to continue\" \"I am unwanted\"  ? Images: none  ? Thinking Processes: ruminating  ? Mood: anger  ? Behaviors: isolating/withdrawing , can't stop crying, not taking care of myself and not taking care of my responsibilities  ? Situations: small triggers, such as not being able to find something, or dropping something   Step 2: Coping strategies - Things I can do to take my mind off of my problems without contacting another person (relaxation technique, physical activity):  ? Distress Tolerance Strategies:  arts and crafts: drawing, play with my pet , listen to positive and upbeat music: any, change body temperature (ice pack/cold water)  and paced breathing/progressive muscle relaxation  ? Physical Activities: go for a walk, deep breathing and stretching   ? Focus on helpful thoughts:  \"You've been through this before, you can get through it again.\"  Step 3: People and social settings that provide distraction:                 Name: Carmen                            Name: Darien                           Name: Aleida       ? pool, shopping, Carmen's house, [employer]       Step 4: Remind myself of people and things that are important to me and worth living for:  Clifford Little Donna, post-COVID world, options of what could be in your future        Step 5: When I am in crisis, I can ask these people to help me use my safety plan:                 Name: Sidney  Step 6: Making the environment safe:   ? go to sleep/daydream  Step 7: Professionals or agencies I can contact during a crisis:  ? [facility] Daytime Number: [phone]  ? Suicide Prevention Lifeline: 1-703-870-MBLO (3572)  ? Crisis Text Line Service (available " 24 hours a day, 7 days a week): Text [place] to 465326    Local Crisis Services: [facility] Crisis: [phone]  Adults can always access to the emPATH unit at [facility] (no phone number, utilize it like an urgent care or ER where you just show up)     Call 911 or go to my nearest emergency department.       I helped develop this safety plan and agree to use it when needed.  I have been given a copy of this plan.       Client signature _________________________________________________________________  Today s date:  11/24/2020  Adapted from Safety Plan Template 2008 Randi Poole and Robby Barba is reprinted with the express permission of the authors.  No portion of the Safety Plan Template may be reproduced without the express, written permission.  You can contact the authors at [email or web address] or [email or web address].

## 2023-06-19 ENCOUNTER — VIRTUAL VISIT (OUTPATIENT)
Dept: PSYCHOLOGY | Facility: CLINIC | Age: 68
End: 2023-06-19
Payer: MEDICARE

## 2023-06-19 DIAGNOSIS — F41.1 GENERALIZED ANXIETY DISORDER: ICD-10-CM

## 2023-06-19 DIAGNOSIS — F43.9 TRAUMA AND STRESSOR-RELATED DISORDER: ICD-10-CM

## 2023-06-19 DIAGNOSIS — F31.81 BIPOLAR 2 DISORDER (H): Primary | ICD-10-CM

## 2023-06-19 PROCEDURE — 90837 PSYTX W PT 60 MINUTES: CPT | Mod: VID | Performed by: SOCIAL WORKER

## 2023-06-19 ASSESSMENT — PATIENT HEALTH QUESTIONNAIRE - PHQ9
10. IF YOU CHECKED OFF ANY PROBLEMS, HOW DIFFICULT HAVE THESE PROBLEMS MADE IT FOR YOU TO DO YOUR WORK, TAKE CARE OF THINGS AT HOME, OR GET ALONG WITH OTHER PEOPLE: VERY DIFFICULT
SUM OF ALL RESPONSES TO PHQ QUESTIONS 1-9: 10
SUM OF ALL RESPONSES TO PHQ QUESTIONS 1-9: 10

## 2023-06-19 NOTE — PROGRESS NOTES
[facility]                                     Progress Note    Patient Name: Randi Cleary  Date: 6/19/23         Service Type: Individual     Session Start Time: 3:35 PM Session End Time: 4:28 PM     Session Length: 53 minutes    Session #: 183    Attendees: Client attended alone    Service Modality:  Video Visit:      Provider verified identity through the following two step process.  Patient provided:  Patient is known previously to provider    Telemedicine Visit: The patient's condition can be safely assessed and treated via synchronous audio and visual telemedicine encounter.      Reason for Telemedicine Visit: Patient convenience (e.g. access to timely appointments / distance to available provider)    Originating Site (Patient Location): Patient's home    Distant Site (Provider Location): Provider Remote Setting- Home Office    Consent:  The patient/guardian has verbally consented to: the potential risks and benefits of telemedicine (video visit) versus in person care; bill my insurance or make self-payment for services provided; and responsibility for payment of non-covered services.     Patient would like the video invitation sent by:  My Chart    Mode of Communication:  [facility]    Distant Location (Provider):  Off-site    As the provider I attest to compliance with applicable laws and regulations related to telemedicine.      DATA  Extended Session (53+ minutes): No  Interactive Complexity: No  Crisis: No        Progress Since Last Session (Related to Symptoms / Goals / Homework):   Symptoms: Patient has some hope, but is still feeling overwhelmed.        Homework: Progress made  Follow up with Bre Curry for PT -went on Friday, planning on tomorrow  Get to the [facility]  Decide what to do about psychiatry-done         Episode of Care Goals: Satisfactory progress - ACTION (Actively working towards change); Intervened by reinforcing change plan / affirming  steps taken     Current / Ongoing Stressors and Concerns:   Patient is currently socially isolated. She has a conflictual relationship with her .  She is getting minimal physical activity.  She has had several surgeries.      Treatment Objective(s) Addressed in This Session:   Patient will increase frequency of engaging her in ADLs.  Patient will track and record at least 5 pleasant exchanges with . Patient will be able to identify at least 5 positive traits about her .  Patient will reduce level of depressive and anxious features as evidenced by reduction in score on her CHAVO-7 and PHQ-9 (scores of 15 and 16 at first measurment, respectively).     Intervention:   Supportive Therapy:  Processed challenges in the past week. Looked at couples counseling and how her relationship is currently doing. Talked about health concerns. Also talked about her concerns of how her past trauma is currently impacting her.         The following assessments were completed by patient for this visit:  PHQ9:       4/25/2023    12:02 PM 4/28/2023    12:33 PM 5/5/2023     8:09 AM 5/16/2023     8:39 AM 5/31/2023    10:02 AM 6/6/2023    10:41 AM 6/19/2023     3:05 PM   PHQ-9 SCORE   PHQ-9 Total Score MyChart 11 (Moderate depression) 12 (Moderate depression) 15 (Moderately severe depression) 14 (Moderate depression) 17 (Moderately severe depression) 14 (Moderate depression) 10 (Moderate depression)   PHQ-9 Total Score 11 12 15 14 17 14 10     GAD7:       3/9/2023     9:27 AM 3/24/2023     9:06 AM 4/7/2023    12:26 PM 4/25/2023    12:02 PM 5/5/2023     8:07 AM 5/22/2023    10:12 AM 6/6/2023    10:44 AM   CHAVO-7 SCORE   Total Score 15 (severe anxiety) 15 (severe anxiety) 10 (moderate anxiety) 10 (moderate anxiety) 11 (moderate anxiety) 10 (moderate anxiety) 12 (moderate anxiety)   Total Score 15    15    15 15    15    15    15 10 10    10 11 10 12     PROMIS 10-Global Health (only subscores and total score):       2/5/2023      9:48 PM 3/9/2023     9:30 AM 4/7/2023    12:28 PM 4/19/2023     8:26 AM 4/25/2023    12:04 PM 5/5/2023     8:12 AM 6/8/2023    12:05 PM   PROMIS-10 Scores Only   Global Mental Health Score 5    5    5    5    5 6    6    6 7    7    7    7 8    8    8 7    7 6 6    6   Global Physical Health Score 8    8    8    8    8 6    6    6 8    8    8    8 7    7    7 10    10 9 9    9   PROMIS TOTAL - SUBSCORES 13    13    13    13    13 12    12    12 15    15    15    15 15    15    15 17    17 15 15    15        ASSESSMENT: Current Emotional / Mental Status (status of significant symptoms):   Risk status (Self / Other harm or suicidal ideation)   Patient denies current fears or concerns for personal safety.   Patient denies current or recent suicidal ideation or behaviors.   Patient denies current or recent homicidal ideation or behaviors.   Patient denies current or recent self injurious behavior or ideation.   Patient denies other safety concerns.   Patient reports there has been no change in risk factors since their last session.     Patient reports there has been no change in protective factors since their last session.     A safety and risk management plan has been developed including: Patient consented to co-developed safety plan on 11/24/2020, updated 2/20/23.  Safety and risk management plan was reviewed.   Patient agreed to use safety plan should any safety concerns arise.  A copy was made available to the patient.     Appearance:   Appropriate    Eye Contact:   Good    Psychomotor Behavior: Normal    Attitude:   Cooperative    Orientation:   All   Speech    Rate / Production: Normal/ Responsive    Volume:  Normal    Mood:    Anxious    Affect:    Appropriate    Thought Content:  Clear    Thought Form:  Coherent    Insight:    Good      Medication Review:   No changes to current psychiatric medication(s)     Medication Compliance:   Yes     Changes in Health Issues:   None reported     Chemical Use  Review:   Substance Use: Chemical use reviewed, no active concerns identified Nothing used since 2021.     Tobacco Use: No current tobacco use.      Diagnosis:  1. Bipolar 2 disorder (H)    2. Generalized anxiety disorder    3. Trauma and stressor-related disorder      Collateral Reports Completed:   Not Applicable    PLAN: (Patient Tasks / Therapist Tasks / Other)  If you decide to approach the staff about your concern, remember to focus it on how you feel  Contact couples counselor at [phone]    In the future:  Look at a budget      There has been demonstrated improvement in functioning while patient has been engaged in psychotherapy/psychological service- if withdrawn the patient would deteriorate and/or relapse.     MICAELA SLADE, [facility]   2023                                                        ______________________________________________________________________    Individual Treatment Plan    Patient's Name: Randi Cleary  YOB: 1955    Date of Creation: 20  Date Treatment Plan Last Reviewed/Revised: 23    DSM5 Diagnoses: 296.89 Bipolar II Disorder Depressed, 300.02 (F41.1) Generalized Anxiety Disorder or Adjustment Disorders  309.89 (F43.8) Other Specified Trauma and Stressor Related Disorder  Psychosocial / Contextual Factors: Patient's entire family of origin has , she now has a sister-in-law and  as support.  Relationship with  is conflictual. She is recovering from surgeries  PROMIS (reviewed every 90 days): PROMIS-10 Scores  PROMIS 10-Global Health (only subscores and total score):   PROMIS-10 Scores Only 2021 3/15/2022 3/15/2022 3/15/2022 3/24/2022 2022 2022   Global Mental Health Score 6 6 6 6 8 12 12   Global Physical Health Score 9 9 9 9 8 11 10   PROMIS TOTAL - SUBSCORES 15 15 15 15 16 23 22       Referral / Collaboration:  Referral to another professional/service is not indicated at this time..    Anticipated  "number of session for this episode of care: 50  Anticipation frequency of session: Biweekly  Anticipated Duration of each session: 53 or more minutes due to intensity of trauma symptoms  Treatment plan will be reviewed in 90 days or when goals have been changed.   There has been demonstrated improvement in functioning while patient has been engaged in psychotherapy/psychological service- if withdrawn the patient would deteriorate and/or relapse.       MeasurableTreatment Goal(s) related to diagnosis / functional impairment(s)  Goal 1: Patient will \"jumpstart, getting going with the things I need to be doing around the house as far as picking up, doing things, trying to do something every day.  Also to lessen the animosity between me and my .\"    I will know I've met my goal when my shoulders are fixed and I can see.      Objective #A (Patient Action)    Patient will increase frequency of engaging her in ADLs.  Status: Continued - Date(s): 12/10/21, 3/9/22, 6/9/22, 9/2/22, 12/1/22, 3/2/23, 6/6/23    Intervention(s)  Therapist will engage patient in CBT, specifically behavioral activation.    Objective #B  Patient will  track and record at least 5 pleasant exchanges with . Patient will be able to identify at least 5 positive traits about her  and how he relates to her.  Status: Continued - Date(s): 12/10/21, 3/9/22, 6/9/22, 9/2/22, 12/1/22, 3/2/23, 6/6/23    Intervention(s)  Therapist will teach assertiveness skills and assign homework related to relationship interactions.    Objective #C  Patient will reduce level of depressive and anxious features as evidenced by reduction in score on her CHAVO-7 and PHQ-9 (scores of 15 and 16 at first measurment, respectively).  Status: Continued - Date(s): 12/10/21, 3/9/22, 6/9/22, 9/2/22, 12/1/22, 3/2/23, 6/6/23    Intervention(s)  Therapist will engage patient in person-centered therapy and CBT.    Patient has reviewed and agreed to the above plan.      B " "MICAELA BAKER, [facility]  June 6, 2023                                                   Randi RIVERA Cathy Evin          SAFETY PLAN:  Step 1: Warning signs / cues (Thoughts, images, mood, situation, behavior) that a crisis may be developing:  ? Thoughts: \"I don't want to continue\" \"I am unwanted\"  ? Images: none  ? Thinking Processes: ruminating  ? Mood: anger  ? Behaviors: isolating/withdrawing , can't stop crying, not taking care of myself and not taking care of my responsibilities  ? Situations: small triggers, such as not being able to find something, or dropping something   Step 2: Coping strategies - Things I can do to take my mind off of my problems without contacting another person (relaxation technique, physical activity):  ? Distress Tolerance Strategies:  arts and crafts: drawing, play with my pet , listen to positive and upbeat music: any, change body temperature (ice pack/cold water)  and paced breathing/progressive muscle relaxation  ? Physical Activities: go for a walk, deep breathing and stretching   ? Focus on helpful thoughts:  \"You've been through this before, you can get through it again.\"  Step 3: People and social settings that provide distraction:                 Name: Carmen                            Name: Darien                           Name: Aleida       ? pool, shopping, Carmen's house, [employer]       Step 4: Remind myself of people and things that are important to me and worth living for:  Clifford Little Donna, post-COVID world, options of what could be in your future        Step 5: When I am in crisis, I can ask these people to help me use my safety plan:                 Name: Sidney  Step 6: Making the environment safe:   ? go to sleep/daydream  Step 7: Professionals or agencies I can contact during a crisis:  ? [facility] Daytime Number: [phone]  ? Suicide Prevention Lifeline: 3-473-738-APGX (2087)  ? Crisis Text Line Service (available 24 hours a day, 7 days a week): Text " [place] to 858166    Local Crisis Services: [facility] Crisis: [phone]  Adults can always access to the emPATH unit at [facility] (no phone number, utilize it like an urgent care or ER where you just show up)     Call 911 or go to my nearest emergency department.       I helped develop this safety plan and agree to use it when needed.  I have been given a copy of this plan.       Client signature _________________________________________________________________  Today s date:  11/24/2020  Adapted from Safety Plan Template 2008 Randi Poole and Robby Barba is reprinted with the express permission of the authors.  No portion of the Safety Plan Template may be reproduced without the express, written permission.  You can contact the authors at [email or web address] or [email or web address].

## 2023-06-20 ENCOUNTER — VIRTUAL VISIT (OUTPATIENT)
Dept: CARDIOLOGY | Facility: CLINIC | Age: 68
End: 2023-06-20
Attending: INTERNAL MEDICINE
Payer: MEDICARE

## 2023-06-20 ENCOUNTER — TELEPHONE (OUTPATIENT)
Dept: CARDIOLOGY | Facility: CLINIC | Age: 68
End: 2023-06-20

## 2023-06-20 DIAGNOSIS — I27.20 PULMONARY HTN (H): Primary | ICD-10-CM

## 2023-06-20 DIAGNOSIS — R06.02 SHORTNESS OF BREATH: ICD-10-CM

## 2023-06-20 DIAGNOSIS — R06.09 DOE (DYSPNEA ON EXERTION): Primary | ICD-10-CM

## 2023-06-20 DIAGNOSIS — R79.9 ABNORMAL FINDING OF BLOOD CHEMISTRY, UNSPECIFIED: ICD-10-CM

## 2023-06-20 PROCEDURE — 99207 PR NO BILLABLE SERVICE THIS VISIT: CPT | Mod: VID | Performed by: INTERNAL MEDICINE

## 2023-06-20 ASSESSMENT — ENCOUNTER SYMPTOMS
HYPOTENSION: 0
SKIN CHANGES: 0
NECK PAIN: 1
BREAST PAIN: 0
DECREASED CONCENTRATION: 1
SEIZURES: 0
TREMORS: 0
SHORTNESS OF BREATH: 1
MYALGIAS: 1
NUMBNESS: 1
SNORES LOUDLY: 1
NERVOUS/ANXIOUS: 1
COUGH: 1
POLYDIPSIA: 0
BREAST MASS: 1
SYNCOPE: 0
BACK PAIN: 1
DIFFICULTY URINATING: 1
NIGHT SWEATS: 0
FEVER: 0
PANIC: 1
HEMOPTYSIS: 0
HYPERTENSION: 0
POOR WOUND HEALING: 0
WHEEZING: 1
SPEECH CHANGE: 1
FATIGUE: 1
MUSCLE WEAKNESS: 1
PALPITATIONS: 1
HEADACHES: 1
NAIL CHANGES: 1
DEPRESSION: 1
DECREASED APPETITE: 0
ARTHRALGIAS: 1
DIZZINESS: 1
CHILLS: 0
ALTERED TEMPERATURE REGULATION: 1
PARALYSIS: 0
TINGLING: 1
ORTHOPNEA: 0
HEMATURIA: 0
FLANK PAIN: 0
STIFFNESS: 1
LIGHT-HEADEDNESS: 1
WEIGHT GAIN: 1
MUSCLE CRAMPS: 0
JOINT SWELLING: 1
INCREASED ENERGY: 1
POSTURAL DYSPNEA: 1
LEG PAIN: 0
COUGH DISTURBING SLEEP: 0
DYSPNEA ON EXERTION: 1
DYSURIA: 0
MEMORY LOSS: 0
HALLUCINATIONS: 0
INSOMNIA: 1
SPUTUM PRODUCTION: 1
POLYPHAGIA: 0
WEAKNESS: 1
WEIGHT LOSS: 0
SLEEP DISTURBANCES DUE TO BREATHING: 0
EXERCISE INTOLERANCE: 1
DISTURBANCES IN COORDINATION: 1
LOSS OF CONSCIOUSNESS: 0

## 2023-06-20 NOTE — NURSING NOTE
Is the patient currently in the state of [place]? YES    Visit mode:VIDEO    If the visit is dropped, the patient can be reconnected by: VIDEO VISIT: Text to cell phone: [phone]    Will anyone else be joining the visit? NO      How would you like to obtain your AVS? MyChart    Are changes needed to the allergy or medication list? NO     Patient declined individual allergy and medication review by support staff because patient denies any changes since echeck-in completion and states all information entered during echeck-in remains accurate.    Reason for visit: RECHHOSSEIN Dorado, LEATHA/CMA

## 2023-06-20 NOTE — TELEPHONE ENCOUNTER
Health Call Center    Phone Message    May a detailed message be left on voicemail: yes     Reason for Call: Other: Pt is not ok mentally today and she is having a crisis. She wants to be seen but physically she just can't. Is there anyway to make this appt for today virtual? She has a lab appt but is willing to get them done tomorrow at her home clinic. Please call pt to discuss her options. she had a medication adjustement and it's not treating her well emotionally.      Action Taken: Message routed to:  [facility] Cardiology    Travel Screening: Not Applicable       Thank you!  Specialty Access Center

## 2023-06-20 NOTE — TELEPHONE ENCOUNTER
Returned pt call and pt was having a bad day and wanted to keep appt with Dr Edwards, but needed to change to virtual.      Miki Richardson RN on 6/20/2023 at 9:30 AM

## 2023-06-20 NOTE — PATIENT INSTRUCTIONS
Medication Changes:      Patient Instructions:  1. Continue staying active and eat a heart healthy diet.    2. Please keep current list of medications with you at all times.    3. Remember to weigh yourself daily after voiding and before you consume any food or beverages and log the numbers.  If you have gained 2 pounds overnight or 5 pounds in a week contact us immediately for medication adjustments or further instructions.    4. **Please call us immediately if you have any syncope (fainting or passing out), chest pain, edema (swelling or weight gain), or decline in your functional status (general decline in how you are feeling).    5. Patients on Remodulin (treprostinil) or Veletri (epoprostenol): Please make sure that you have your backup pump and supplies with you at all times, your mixing instructions, and contact information for your specialty pharmacy.    Follow up Appointment Information:      Results:    We are located on the third floor of the [facility] on the [facility].  Our address is     [address] [place] [address] [place] [address] [place] [address]    Thank you for allowing us to be a part of your care here at the [facility]    If you have questions or concerns please contact us at:    Miki Richardson, CHRISTINA    Nurse Coordinator       Pulmonary Hypertension     [facility]    (Phone)[phone]       JON Caraballo   (Prior Authorizations)    ()  Clinic   Clinic   Pulmonary Hypertension   Pulmonary Hypertension  [facility]  [facility]  (P)[phone]    (P) [phone]  (F) [phone]              ** Please note that you will NOT receive a reminder call regarding your scheduled testing, reminder calls are for provider appointments only.  If you are scheduled for testing  within the [facility] system you may receive a call regarding pre-registration for billing purposes only.**     Remember to weigh yourself daily after voiding and before you consume any food or beverages and log the numbers.  If you have gained/lost 2 pounds overnight or 5 pounds in a week contact us immediately for medication adjustments or further instructions.   **Please call us immediately if you have any syncope, chest pain, edema, or decline in your functional status.    Support Group:  Pulmonary Hypertension Association  Https://www.phassociation.org/  **Look at the Events Tab** They even have Support Groups that you can call into    [facility] PH Support Group  Second Saturday of the Month from 1-3 PM   Location: [address] [place] [address]  Leader: Corry Reeves   Phone: [phone]  Email: [email or web address]     Great Videos about Pulmonary Hypertension!!  Scan ME!    Website: [email or web address]

## 2023-06-20 NOTE — LETTER
6/20/2023      RE: Randi Cleary  [address] [place] [address]       Dear Colleague,    Thank you for the opportunity to participate in the care of your patient, Randi Cleary, at the [facility] [place] [facility]. Please see a copy of my visit note below.    Virtual Visit Details          Please do not hesitate to contact me if you have any questions/concerns.     Sincerely,     Francisco J Edwards MD

## 2023-06-21 ENCOUNTER — MYC MEDICAL ADVICE (OUTPATIENT)
Dept: INTERNAL MEDICINE | Facility: CLINIC | Age: 68
End: 2023-06-21

## 2023-06-21 DIAGNOSIS — R05.1 ACUTE COUGH: Primary | ICD-10-CM

## 2023-06-21 DIAGNOSIS — K21.00 GASTROESOPHAGEAL REFLUX DISEASE WITH ESOPHAGITIS WITHOUT HEMORRHAGE: ICD-10-CM

## 2023-06-21 RX ORDER — BENZONATATE 200 MG/1
200 CAPSULE ORAL 3 TIMES DAILY PRN
Qty: 30 CAPSULE | Refills: 1 | Status: SHIPPED | OUTPATIENT
Start: 2023-06-21 | End: 2023-10-19

## 2023-06-22 ENCOUNTER — HOSPITAL ENCOUNTER (OUTPATIENT)
Dept: BEHAVIORAL HEALTH | Facility: CLINIC | Age: 68
Discharge: HOME | End: 2023-06-22
Attending: PSYCHIATRY & NEUROLOGY
Payer: MEDICARE

## 2023-06-22 ENCOUNTER — MEDICAL CORRESPONDENCE (OUTPATIENT)
Dept: NEUROSURGERY | Facility: CLINIC | Age: 68
End: 2023-06-22
Payer: MEDICARE

## 2023-06-22 PROCEDURE — 90853 GROUP PSYCHOTHERAPY: CPT

## 2023-06-22 ASSESSMENT — ANXIETY QUESTIONNAIRES
1. FEELING NERVOUS, ANXIOUS, OR ON EDGE: NEARLY EVERY DAY
4. TROUBLE RELAXING: NEARLY EVERY DAY
7. FEELING AFRAID AS IF SOMETHING AWFUL MIGHT HAPPEN: MORE THAN HALF THE DAYS
1. FEELING NERVOUS, ANXIOUS, OR ON EDGE: NEARLY EVERY DAY
IF YOU CHECKED OFF ANY PROBLEMS ON THIS QUESTIONNAIRE, HOW DIFFICULT HAVE THESE PROBLEMS MADE IT FOR YOU TO DO YOUR WORK, TAKE CARE OF THINGS AT HOME, OR GET ALONG WITH OTHER PEOPLE: VERY DIFFICULT
IF YOU CHECKED OFF ANY PROBLEMS ON THIS QUESTIONNAIRE, HOW DIFFICULT HAVE THESE PROBLEMS MADE IT FOR YOU TO DO YOUR WORK, TAKE CARE OF THINGS AT HOME, OR GET ALONG WITH OTHER PEOPLE: VERY DIFFICULT
GAD7 TOTAL SCORE: 17
8. IF YOU CHECKED OFF ANY PROBLEMS, HOW DIFFICULT HAVE THESE MADE IT FOR YOU TO DO YOUR WORK, TAKE CARE OF THINGS AT HOME, OR GET ALONG WITH OTHER PEOPLE?: VERY DIFFICULT
GAD7 TOTAL SCORE: 17
7. FEELING AFRAID AS IF SOMETHING AWFUL MIGHT HAPPEN: MORE THAN HALF THE DAYS
GAD7 TOTAL SCORE: 17
6. BECOMING EASILY ANNOYED OR IRRITABLE: NEARLY EVERY DAY
GAD7 TOTAL SCORE: 17
2. NOT BEING ABLE TO STOP OR CONTROL WORRYING: MORE THAN HALF THE DAYS
8. IF YOU CHECKED OFF ANY PROBLEMS, HOW DIFFICULT HAVE THESE MADE IT FOR YOU TO DO YOUR WORK, TAKE CARE OF THINGS AT HOME, OR GET ALONG WITH OTHER PEOPLE?: VERY DIFFICULT
GAD7 TOTAL SCORE: 17
7. FEELING AFRAID AS IF SOMETHING AWFUL MIGHT HAPPEN: MORE THAN HALF THE DAYS
5. BEING SO RESTLESS THAT IT IS HARD TO SIT STILL: MORE THAN HALF THE DAYS
GAD7 TOTAL SCORE: 17
4. TROUBLE RELAXING: NEARLY EVERY DAY
6. BECOMING EASILY ANNOYED OR IRRITABLE: NEARLY EVERY DAY
3. WORRYING TOO MUCH ABOUT DIFFERENT THINGS: MORE THAN HALF THE DAYS
5. BEING SO RESTLESS THAT IT IS HARD TO SIT STILL: MORE THAN HALF THE DAYS
3. WORRYING TOO MUCH ABOUT DIFFERENT THINGS: MORE THAN HALF THE DAYS
2. NOT BEING ABLE TO STOP OR CONTROL WORRYING: MORE THAN HALF THE DAYS
7. FEELING AFRAID AS IF SOMETHING AWFUL MIGHT HAPPEN: MORE THAN HALF THE DAYS
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
## 2023-06-23 ENCOUNTER — TELEPHONE (OUTPATIENT)
Dept: PSYCHIATRY | Facility: CLINIC | Age: 68
End: 2023-06-23

## 2023-06-23 ENCOUNTER — VIRTUAL VISIT (OUTPATIENT)
Dept: PSYCHOLOGY | Facility: CLINIC | Age: 68
End: 2023-06-23
Payer: MEDICARE

## 2023-06-23 DIAGNOSIS — F31.81 BIPOLAR 2 DISORDER (H): Primary | ICD-10-CM

## 2023-06-23 DIAGNOSIS — F43.9 TRAUMA AND STRESSOR-RELATED DISORDER: ICD-10-CM

## 2023-06-23 DIAGNOSIS — F41.1 GENERALIZED ANXIETY DISORDER: ICD-10-CM

## 2023-06-23 PROCEDURE — 90837 PSYTX W PT 60 MINUTES: CPT | Mod: VID | Performed by: SOCIAL WORKER

## 2023-06-23 NOTE — PROGRESS NOTES
[facility]                                     Progress Note    Patient Name: Randi Cleary  Date: 6/23/23         Service Type: Individual     Session Start Time: 12:32 PM Session End Time: 1:26 PM     Session Length: 54 minutes    Session #: 184    Attendees: Client attended alone    Service Modality:  Video Visit:      Provider verified identity through the following two step process.  Patient provided:  Patient is known previously to provider    Telemedicine Visit: The patient's condition can be safely assessed and treated via synchronous audio and visual telemedicine encounter.      Reason for Telemedicine Visit: Patient convenience (e.g. access to timely appointments / distance to available provider)    Originating Site (Patient Location): Patient's home    Distant Site (Provider Location): Provider Remote Setting- Home Office    Consent:  The patient/guardian has verbally consented to: the potential risks and benefits of telemedicine (video visit) versus in person care; bill my insurance or make self-payment for services provided; and responsibility for payment of non-covered services.     Patient would like the video invitation sent by:  My Chart    Mode of Communication:  Video Conference via Amwell    Distant Location (Provider):  Off-site    As the provider I attest to compliance with applicable laws and regulations related to telemedicine.      DATA  Extended Session (53+ minutes): No  Interactive Complexity: No  Crisis: No        Progress Since Last Session (Related to Symptoms / Goals / Homework):   Symptoms: Patient has had some frustration around diagnoses changing       Homework: Progress made  If you decide to approach the staff about your concern, remember to focus it on how you feel  Contact couples counselor at [phone]    In the future:  Look at a budget       Episode of Care Goals: Satisfactory progress - ACTION (Actively working towards change); Intervened by  reinforcing change plan / affirming steps taken     Current / Ongoing Stressors and Concerns:   Patient is currently socially isolated. She has a conflictual relationship with her .  She is getting minimal physical activity.  She has had several surgeries.      Treatment Objective(s) Addressed in This Session:   Patient will increase frequency of engaging her in ADLs.  Patient will track and record at least 5 pleasant exchanges with . Patient will be able to identify at least 5 positive traits about her .  Patient will reduce level of depressive and anxious features as evidenced by reduction in score on her CHAVO-7 and PHQ-9 (scores of 15 and 16 at first measurment, respectively).     Intervention:   Supportive Therapy:  Talked about constantly changing diagnosis and her frustrations around this, including her worry it will impact her medications. Talked about challenges with group right now, including her being the only participant yesterday. Discussed next steps for caring for yourself.     The following assessments were completed by patient for this visit:  PHQ9:       4/28/2023    12:33 PM 5/5/2023     8:09 AM 5/16/2023     8:39 AM 5/31/2023    10:02 AM 6/6/2023    10:41 AM 6/19/2023     3:05 PM 6/22/2023    10:02 AM   PHQ-9 SCORE   PHQ-9 Total Score MyChart 12 (Moderate depression) 15 (Moderately severe depression) 14 (Moderate depression) 17 (Moderately severe depression) 14 (Moderate depression) 10 (Moderate depression) 17 (Moderately severe depression)   PHQ-9 Total Score 12 15 14 17 14 10 17     GAD7:       3/24/2023     9:06 AM 4/7/2023    12:26 PM 4/25/2023    12:02 PM 5/5/2023     8:07 AM 5/22/2023    10:12 AM 6/6/2023    10:44 AM 6/22/2023     9:58 AM   CHAVO-7 SCORE   Total Score 15 (severe anxiety) 10 (moderate anxiety) 10 (moderate anxiety) 11 (moderate anxiety) 10 (moderate anxiety) 12 (moderate anxiety) 17 (severe anxiety)   Total Score 15    15    15    15 10 10    10 11 10 12 17     17     PROMIS 10-Global Health (only subscores and total score):       3/9/2023     9:30 AM 4/7/2023    12:28 PM 4/19/2023     8:26 AM 4/25/2023    12:04 PM 5/5/2023     8:12 AM 6/8/2023    12:05 PM 6/22/2023    10:03 AM   PROMIS-10 Scores Only   Global Mental Health Score 6    6    6 7    7    7    7 8    8    8 7    7 6 6    6 5    5   Global Physical Health Score 6    6    6 8    8    8    8 7    7    7 10    10 9 9    9 7    7   PROMIS TOTAL - SUBSCORES 12    12    12 15    15    15    15 15    15    15 17    17 15 15    15 12    12        ASSESSMENT: Current Emotional / Mental Status (status of significant symptoms):   Risk status (Self / Other harm or suicidal ideation)   Patient denies current fears or concerns for personal safety.   Patient denies current or recent suicidal ideation or behaviors.   Patient denies current or recent homicidal ideation or behaviors.   Patient denies current or recent self injurious behavior or ideation.   Patient denies other safety concerns.   Patient reports there has been no change in risk factors since their last session.     Patient reports there has been no change in protective factors since their last session.     A safety and risk management plan has been developed including: Patient consented to co-developed safety plan on 11/24/2020, updated 2/20/23.  Safety and risk management plan was reviewed.   Patient agreed to use safety plan should any safety concerns arise.  A copy was made available to the patient.     Appearance:   Appropriate    Eye Contact:   Good    Psychomotor Behavior: Normal    Attitude:   Cooperative    Orientation:   All   Speech    Rate / Production: Normal/ Responsive    Volume:  Normal    Mood:    Anxious    Affect:    Appropriate    Thought Content:  Clear    Thought Form:  Coherent    Insight:    Good      Medication Review:   No changes to current psychiatric medication(s)     Medication Compliance:   Yes     Changes in Health Issues:   None  reported     Chemical Use Review:   Substance Use: Chemical use reviewed, no active concerns identified Nothing used since 2021.     Tobacco Use: No current tobacco use.      Diagnosis:  1. Bipolar 2 disorder (H)    2. Generalized anxiety disorder    3. Trauma and stressor-related disorder      Collateral Reports Completed:   Not Applicable    PLAN: (Patient Tasks / Therapist Tasks / Other)  If you decide to approach the staff about your concern, remember to focus it on how you feel  Contact couples counselor at [phone]    In the future:  Look at a budget      There has been demonstrated improvement in functioning while patient has been engaged in psychotherapy/psychological service- if withdrawn the patient would deteriorate and/or relapse.     MICAELA SLADE, [facility]   2023                                                        ______________________________________________________________________    Individual Treatment Plan    Patient's Name: Randi Cleary  YOB: 1955    Date of Creation: 20  Date Treatment Plan Last Reviewed/Revised: 23    DSM5 Diagnoses: 296.89 Bipolar II Disorder Depressed, 300.02 (F41.1) Generalized Anxiety Disorder or Adjustment Disorders  309.89 (F43.8) Other Specified Trauma and Stressor Related Disorder  Psychosocial / Contextual Factors: Patient's entire family of origin has , she now has a sister-in-law and  as support.  Relationship with  is conflictual. She is recovering from surgeries  PROMIS (reviewed every 90 days): PROMIS-10 Scores  PROMIS 10-Global Health (only subscores and total score):   PROMIS-10 Scores Only 2021 3/15/2022 3/15/2022 3/15/2022 3/24/2022 2022 2022   Global Mental Health Score 6 6 6 6 8 12 12   Global Physical Health Score 9 9 9 9 8 11 10   PROMIS TOTAL - SUBSCORES 15 15 15 15 16 23 22       Referral / Collaboration:  Referral to another professional/service is not indicated at  "this time..    Anticipated number of session for this episode of care: 50  Anticipation frequency of session: Biweekly  Anticipated Duration of each session: 53 or more minutes due to intensity of trauma symptoms  Treatment plan will be reviewed in 90 days or when goals have been changed.   There has been demonstrated improvement in functioning while patient has been engaged in psychotherapy/psychological service- if withdrawn the patient would deteriorate and/or relapse.       MeasurableTreatment Goal(s) related to diagnosis / functional impairment(s)  Goal 1: Patient will \"jumpstart, getting going with the things I need to be doing around the house as far as picking up, doing things, trying to do something every day.  Also to lessen the animosity between me and my .\"    I will know I've met my goal when my shoulders are fixed and I can see.      Objective #A (Patient Action)    Patient will increase frequency of engaging her in ADLs.  Status: Continued - Date(s): 12/10/21, 3/9/22, 6/9/22, 9/2/22, 12/1/22, 3/2/23, 6/6/23    Intervention(s)  Therapist will engage patient in CBT, specifically behavioral activation.    Objective #B  Patient will  track and record at least 5 pleasant exchanges with . Patient will be able to identify at least 5 positive traits about her  and how he relates to her.  Status: Continued - Date(s): 12/10/21, 3/9/22, 6/9/22, 9/2/22, 12/1/22, 3/2/23, 6/6/23    Intervention(s)  Therapist will teach assertiveness skills and assign homework related to relationship interactions.    Objective #C  Patient will reduce level of depressive and anxious features as evidenced by reduction in score on her CHAVO-7 and PHQ-9 (scores of 15 and 16 at first measurment, respectively).  Status: Continued - Date(s): 12/10/21, 3/9/22, 6/9/22, 9/2/22, 12/1/22, 3/2/23, 6/6/23    Intervention(s)  Therapist will engage patient in person-centered therapy and CBT.    Patient has reviewed and agreed to " "the above plan.      B MICAELA BAKER JO, [facility]  June 6, 2023                                                   Randi Cleary          SAFETY PLAN:  Step 1: Warning signs / cues (Thoughts, images, mood, situation, behavior) that a crisis may be developing:  ? Thoughts: \"I don't want to continue\" \"I am unwanted\"  ? Images: none  ? Thinking Processes: ruminating  ? Mood: anger  ? Behaviors: isolating/withdrawing , can't stop crying, not taking care of myself and not taking care of my responsibilities  ? Situations: small triggers, such as not being able to find something, or dropping something   Step 2: Coping strategies - Things I can do to take my mind off of my problems without contacting another person (relaxation technique, physical activity):  ? Distress Tolerance Strategies:  arts and crafts: drawing, play with my pet , listen to positive and upbeat music: any, change body temperature (ice pack/cold water)  and paced breathing/progressive muscle relaxation  ? Physical Activities: go for a walk, deep breathing and stretching   ? Focus on helpful thoughts:  \"You've been through this before, you can get through it again.\"  Step 3: People and social settings that provide distraction:                 Name: Carmen                            Name: Darien                           Name: Aleida       ? pool, shopping, Carmen's house, [employer]       Step 4: Remind myself of people and things that are important to me and worth living for:  Clifford Little Donna, post-COVID world, options of what could be in your future        Step 5: When I am in crisis, I can ask these people to help me use my safety plan:                 Name: Sidney  Step 6: Making the environment safe:   ? go to sleep/daydream  Step 7: Professionals or agencies I can contact during a crisis:  ? [facility] Daytime Number: [phone]  ? Suicide Prevention Lifeline: 9-696-217-IAID (0525)  ? Crisis Text Line Service (available 24 hours a day, " 7 days a week): Text [place] to 666375    Local Crisis Services: [facility] Crisis: [phone]  Adults can always access to the emPATH unit at [facility] (no phone number, utilize it like an urgent care or ER where you just show up)     Call 911 or go to my nearest emergency department.       I helped develop this safety plan and agree to use it when needed.  I have been given a copy of this plan.       Client signature _________________________________________________________________  Today s date:  11/24/2020  Adapted from Safety Plan Template 2008 Randi Poole and Robby Barba is reprinted with the express permission of the authors.  No portion of the Safety Plan Template may be reproduced without the express, written permission.  You can contact the authors at [email or web address] or [email or web address].
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
## 2023-06-26 ENCOUNTER — VIRTUAL VISIT (OUTPATIENT)
Dept: PSYCHOLOGY | Facility: CLINIC | Age: 68
End: 2023-06-26
Payer: MEDICARE

## 2023-06-26 DIAGNOSIS — F41.1 GENERALIZED ANXIETY DISORDER: ICD-10-CM

## 2023-06-26 DIAGNOSIS — F31.81 BIPOLAR 2 DISORDER (H): Primary | ICD-10-CM

## 2023-06-26 DIAGNOSIS — F43.9 TRAUMA AND STRESSOR-RELATED DISORDER: ICD-10-CM

## 2023-06-26 PROCEDURE — 90837 PSYTX W PT 60 MINUTES: CPT | Mod: VID | Performed by: SOCIAL WORKER

## 2023-06-26 ASSESSMENT — PATIENT HEALTH QUESTIONNAIRE - PHQ9
SUM OF ALL RESPONSES TO PHQ QUESTIONS 1-9: 21
10. IF YOU CHECKED OFF ANY PROBLEMS, HOW DIFFICULT HAVE THESE PROBLEMS MADE IT FOR YOU TO DO YOUR WORK, TAKE CARE OF THINGS AT HOME, OR GET ALONG WITH OTHER PEOPLE: EXTREMELY DIFFICULT
SUM OF ALL RESPONSES TO PHQ QUESTIONS 1-9: 21

## 2023-06-26 NOTE — PROGRESS NOTES
[facility]                                     Progress Note    Patient Name: Randi Cleary  Date: 6/26/23         Service Type: Individual     Session Start Time: 3:31 PM Session End Time: 4:30 PM     Session Length: 54 minutes    Session #: 185    Attendees: Client and Spouse / Significant Other    Service Modality:  Video Visit:      Provider verified identity through the following two step process.  Patient provided:  Patient is known previously to provider    Telemedicine Visit: The patient's condition can be safely assessed and treated via synchronous audio and visual telemedicine encounter.      Reason for Telemedicine Visit: Patient convenience (e.g. access to timely appointments / distance to available provider)    Originating Site (Patient Location): Patient's home    Distant Site (Provider Location): Provider Remote Setting- Home Office    Consent:  The patient/guardian has verbally consented to: the potential risks and benefits of telemedicine (video visit) versus in person care; bill my insurance or make self-payment for services provided; and responsibility for payment of non-covered services.     Patient would like the video invitation sent by:  My Chart    Mode of Communication:  Video Conference via [facility]    Distant Location (Provider):  Off-site    As the provider I attest to compliance with applicable laws and regulations related to telemedicine.      DATA  Extended Session (53+ minutes): PROLONGED SERVICE IN THE OUTPATIENT SETTING REQUIRING DIRECT (FACE-TO-FACE) PATIENT CONTACT BEYOND THE USUAL SERVICE:    - Treatment protocol required additional time to complete, due to the nature of diagnosis being treated.  See Interventions section for details  Interactive Complexity: No  Crisis: No        Progress Since Last Session (Related to Symptoms / Goals / Homework):   Symptoms: Patient reported another panic attack and is highly concerned about it      Homework: Progress  made  Contact couples counselor at [phone] -not done       Episode of Care Goals: Satisfactory progress - ACTION (Actively working towards change); Intervened by reinforcing change plan / affirming steps taken     Current / Ongoing Stressors and Concerns:   Patient is currently socially isolated. She has a conflictual relationship with her .  She is getting minimal physical activity.  She has had several surgeries.      Treatment Objective(s) Addressed in This Session:   Patient will increase frequency of engaging her in ADLs.  Patient will track and record at least 5 pleasant exchanges with . Patient will be able to identify at least 5 positive traits about her .  Patient will reduce level of depressive and anxious features as evidenced by reduction in score on her CHAVO-7 and PHQ-9 (scores of 15 and 16 at first measurment, respectively).     Intervention:   Supportive Therapy:  Processed an incident where patient had asked for help from her , and when he helped her he did things in the way he wanted rather than the ways she had asked. Talked about their communication problems and suggested couples therapy for this. Talked about the emotions that were coming up for her, including her overwhelm. Helped patient express this to her  and ensure she felt heard and recognized that they have similar feelings. Talked about how this was regulating. Discussed how they can continue to support each other, including using written communication to have clearer communication until they can get into couples therapy.      The following assessments were completed by patient for this visit:  PHQ9:       5/5/2023     8:09 AM 5/16/2023     8:39 AM 5/31/2023    10:02 AM 6/6/2023    10:41 AM 6/19/2023     3:05 PM 6/22/2023    10:02 AM 6/26/2023     1:15 PM   PHQ-9 SCORE   PHQ-9 Total Score [place] 15 (Moderately severe depression) 14 (Moderate depression) 17 (Moderately severe depression) 14  (Moderate depression) 10 (Moderate depression) 17 (Moderately severe depression) 21 (Severe depression)   PHQ-9 Total Score 15 14 17 14 10 17 21     GAD7:       3/24/2023     9:06 AM 4/7/2023    12:26 PM 4/25/2023    12:02 PM 5/5/2023     8:07 AM 5/22/2023    10:12 AM 6/6/2023    10:44 AM 6/22/2023     9:58 AM   CHAVO-7 SCORE   Total Score 15 (severe anxiety) 10 (moderate anxiety) 10 (moderate anxiety) 11 (moderate anxiety) 10 (moderate anxiety) 12 (moderate anxiety) 17 (severe anxiety)   Total Score 15    15    15    15 10 10    10 11 10 12 17    17     PROMIS 10-Global Health (only subscores and total score):       3/9/2023     9:30 AM 4/7/2023    12:28 PM 4/19/2023     8:26 AM 4/25/2023    12:04 PM 5/5/2023     8:12 AM 6/8/2023    12:05 PM 6/22/2023    10:03 AM   PROMIS-10 Scores Only   Global Mental Health Score 6    6    6 7    7    7    7 8    8    8 7    7 6 6    6 5    5   Global Physical Health Score 6    6    6 8    8    8    8 7    7    7 10    10 9 9    9 7    7   PROMIS TOTAL - SUBSCORES 12    12    12 15    15    15    15 15    15    15 17    17 15 15    15 12    12        ASSESSMENT: Current Emotional / Mental Status (status of significant symptoms):   Risk status (Self / Other harm or suicidal ideation)   Patient denies current fears or concerns for personal safety.   Patient reports the following current or recent suicidal ideation or behaviors: patient felt overwhelmed and had thoughts of ending her life, so brought her  with to the session to help with regulation and expression.   Patient denies current or recent homicidal ideation or behaviors.   Patient denies current or recent self injurious behavior or ideation.   Patient denies other safety concerns.   Patient reports there has been no change in risk factors since their last session.     Patient reports there has been no change in protective factors since their last session.     A safety and risk management plan has been developed  including: Patient consented to co-developed safety plan on 11/24/2020, updated 2/20/23.  Safety and risk management plan was reviewed.   Patient agreed to use safety plan should any safety concerns arise.  A copy was made available to the patient.     Appearance:   Appropriate    Eye Contact:   Good    Psychomotor Behavior: Normal    Attitude:   Cooperative    Orientation:   All   Speech    Rate / Production: Normal/ Responsive    Volume:  Normal    Mood:    Anxious    Affect:    Appropriate    Thought Content:  Clear    Thought Form:  Coherent    Insight:    Good      Medication Review:   No changes to current psychiatric medication(s)     Medication Compliance:   Yes     Changes in Health Issues:   None reported     Chemical Use Review:   Substance Use: Chemical use reviewed, no active concerns identified Nothing used since August 2021.     Tobacco Use: No current tobacco use.      Diagnosis:  1. Bipolar 2 disorder (H)    2. Generalized anxiety disorder    3. Trauma and stressor-related disorder      Collateral Reports Completed:   Not Applicable    PLAN: (Patient Tasks / Therapist Tasks / Other)  Contact couples counselor at [phone]  Set priorities for the next day with your  after dinner but before 9, otherwise he will select his own priorities    In the future:  Look at a budget      There has been demonstrated improvement in functioning while patient has been engaged in psychotherapy/psychological service- if withdrawn the patient would deteriorate and/or relapse.     B MICAELA BAKER, [facility]   June 26, 2023                                                        ______________________________________________________________________    Individual Treatment Plan    Patient's Name: Randi Cleary  YOB: 1955    Date of Creation: 6/30/20  Date Treatment Plan Last Reviewed/Revised: 6/6/23    DSM5 Diagnoses: 296.89 Bipolar II Disorder Depressed, 300.02 (F41.1) Generalized Anxiety  "Disorder or Adjustment Disorders  309.89 (F43.8) Other Specified Trauma and Stressor Related Disorder  Psychosocial / Contextual Factors: Patient's entire family of origin has , she now has a sister-in-law and  as support.  Relationship with  is conflictual. She is recovering from surgeries  PROMIS (reviewed every 90 days): PROMIS-10 Scores  PROMIS 10-Global Health (only subscores and total score):   PROMIS-10 Scores Only 2021 3/15/2022 3/15/2022 3/15/2022 3/24/2022 2022 2022   Global Mental Health Score 6 6 6 6 8 12 12   Global Physical Health Score 9 9 9 9 8 11 10   PROMIS TOTAL - SUBSCORES 15 15 15 15 16 23 22       Referral / Collaboration:  Referral to another professional/service is not indicated at this time..    Anticipated number of session for this episode of care: 50  Anticipation frequency of session: Biweekly  Anticipated Duration of each session: 53 or more minutes due to intensity of trauma symptoms  Treatment plan will be reviewed in 90 days or when goals have been changed.   There has been demonstrated improvement in functioning while patient has been engaged in psychotherapy/psychological service- if withdrawn the patient would deteriorate and/or relapse.       MeasurableTreatment Goal(s) related to diagnosis / functional impairment(s)  Goal 1: Patient will \"jumpstart, getting going with the things I need to be doing around the house as far as picking up, doing things, trying to do something every day.  Also to lessen the animosity between me and my .\"    I will know I've met my goal when my shoulders are fixed and I can see.      Objective #A (Patient Action)    Patient will increase frequency of engaging her in ADLs.  Status: Continued - Date(s): 12/10/21, 3/9/22, 22, 22, 22, 3/2/23, 23    Intervention(s)  Therapist will engage patient in CBT, specifically behavioral activation.    Objective #B  Patient will  track and record at least 5 " "pleasant exchanges with . Patient will be able to identify at least 5 positive traits about her  and how he relates to her.  Status: Continued - Date(s): 12/10/21, 3/9/22, 6/9/22, 9/2/22, 12/1/22, 3/2/23, 6/6/23    Intervention(s)  Therapist will teach assertiveness skills and assign homework related to relationship interactions.    Objective #C  Patient will reduce level of depressive and anxious features as evidenced by reduction in score on her CHAVO-7 and PHQ-9 (scores of 15 and 16 at first measurment, respectively).  Status: Continued - Date(s): 12/10/21, 3/9/22, 6/9/22, 9/2/22, 12/1/22, 3/2/23, 6/6/23    Intervention(s)  Therapist will engage patient in person-centered therapy and CBT.    Patient has reviewed and agreed to the above plan.      MICAELA SLADE, [facility]  June 6, 2023                                                   Randi Cleary          SAFETY PLAN:  Step 1: Warning signs / cues (Thoughts, images, mood, situation, behavior) that a crisis may be developing:  ? Thoughts: \"I don't want to continue\" \"I am unwanted\"  ? Images: none  ? Thinking Processes: ruminating  ? Mood: anger  ? Behaviors: isolating/withdrawing , can't stop crying, not taking care of myself and not taking care of my responsibilities  ? Situations: small triggers, such as not being able to find something, or dropping something   Step 2: Coping strategies - Things I can do to take my mind off of my problems without contacting another person (relaxation technique, physical activity):  ? Distress Tolerance Strategies:  arts and crafts: drawing, play with my pet , listen to positive and upbeat music: any, change body temperature (ice pack/cold water)  and paced breathing/progressive muscle relaxation  ? Physical Activities: go for a walk, deep breathing and stretching   ? Focus on helpful thoughts:  \"You've been through this before, you can get through it again.\"  Step 3: People and social settings that provide " distraction:                 Name: Carmen                            Name: Darien                           Name: Aleida       ? pool, shopping, Carmen's house, [employer]       Step 4: Remind myself of people and things that are important to me and worth living for:  Clifford Little Donna, post-COVID world, options of what could be in your future        Step 5: When I am in crisis, I can ask these people to help me use my safety plan:                 Name: Sidney  Step 6: Making the environment safe:   ? go to sleep/daydream  Step 7: Professionals or agencies I can contact during a crisis:  ? [facility] Daytime Number: [phone]  ? Suicide Prevention Lifeline: 9-705-323-LXAI (2574)  ? Crisis Text Line Service (available 24 hours a day, 7 days a week): Text [place] to 349735    Local Crisis Services: [facility] Crisis: [phone]  Adults can always access to the emPATH unit at [facility] (no phone number, utilize it like an urgent care or ER where you just show up)     Call 911 or go to my nearest emergency department.       I helped develop this safety plan and agree to use it when needed.  I have been given a copy of this plan.       Client signature _________________________________________________________________  Today s date:  11/24/2020  Adapted from Safety Plan Template 2008 Randi Poole and Robby Barba is reprinted with the express permission of the authors.  No portion of the Safety Plan Template may be reproduced without the express, written permission.  You can contact the authors at [email or web address] or [email or web address].

## 2023-06-27 ENCOUNTER — TRANSFERRED RECORDS (OUTPATIENT)
Dept: HEALTH INFORMATION MANAGEMENT | Facility: CLINIC | Age: 68
End: 2023-06-27
Payer: MEDICARE

## 2023-06-29 ENCOUNTER — HOSPITAL ENCOUNTER (OUTPATIENT)
Dept: BEHAVIORAL HEALTH | Facility: CLINIC | Age: 68
Discharge: HOME | End: 2023-06-29
Attending: PSYCHIATRY & NEUROLOGY
Payer: MEDICARE

## 2023-06-29 VITALS — HEART RATE: 112 BPM | DIASTOLIC BLOOD PRESSURE: 71 MMHG | OXYGEN SATURATION: 98 % | SYSTOLIC BLOOD PRESSURE: 146 MMHG

## 2023-06-29 DIAGNOSIS — F10.21 ALCOHOL USE DISORDER, MODERATE, IN SUSTAINED REMISSION (H): ICD-10-CM

## 2023-06-29 DIAGNOSIS — F41.1 GENERALIZED ANXIETY DISORDER: ICD-10-CM

## 2023-06-29 DIAGNOSIS — F31.81 BIPOLAR 2 DISORDER (H): Primary | ICD-10-CM

## 2023-06-29 DIAGNOSIS — F43.9 TRAUMA AND STRESSOR-RELATED DISORDER: ICD-10-CM

## 2023-06-29 PROCEDURE — 99417 PROLNG OP E/M EACH 15 MIN: CPT | Performed by: PSYCHIATRY & NEUROLOGY

## 2023-06-29 PROCEDURE — 99215 OFFICE O/P EST HI 40 MIN: CPT | Performed by: PSYCHIATRY & NEUROLOGY

## 2023-06-29 PROCEDURE — 90853 GROUP PSYCHOTHERAPY: CPT | Performed by: SOCIAL WORKER

## 2023-06-29 ASSESSMENT — PATIENT HEALTH QUESTIONNAIRE - PHQ9
SUM OF ALL RESPONSES TO PHQ QUESTIONS 1-9: 19
SUM OF ALL RESPONSES TO PHQ QUESTIONS 1-9: 19
10. IF YOU CHECKED OFF ANY PROBLEMS, HOW DIFFICULT HAVE THESE PROBLEMS MADE IT FOR YOU TO DO YOUR WORK, TAKE CARE OF THINGS AT HOME, OR GET ALONG WITH OTHER PEOPLE: EXTREMELY DIFFICULT
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
## 2023-06-30 NOTE — GROUP NOTE
"Process Group Note    PATIENT'S NAME: Randi Cleary  MRN:   [id number]  :   1955  ACCT. NUMBER: [id number]  DATE OF SERVICE: 23  START TIME:  1:00 PM  END TIME:  1:50 PM  FACILITATOR: Orly Durant LICSW  TOPIC:  Process Group    Diagnoses:  296.33 (F33.2) Major Depressive Disorder, Recurrent Episode, Severe With anxious distress  300.02 (F41.1) Generalized Anxiety Disorder    Rule out:  296.89 Bipolar II Disorder vs. 314.01 (F90.2) Attention-Deficit/Hyperactivity Disorder   Combined presentation      [facility] 55+ Program  TRACK: Clinic 1 55+    NUMBER OF PARTICIPANTS: 3        Data:    Session content: At the start of this group, patients were invited to check in by identifying themselves, describing their current emotional status, and identifying issues to address in this group.   Area(s) of treatment focus addressed in this session included Symptom Management and Develop Socialization / Interpersonal Relationship Skills.    Therapeutic Interventions/Treatment Strategies:  Psychotherapist offered support, feedback and validation and reinforced use of skills. Treatment modalities used include Cognitive Behavioral Therapy and Dialectical Behavioral Therapy. Interventions include Behavioral Activation: Explored how behaviors effect mood and interact with thoughts and feelings, Cognitive Restructuring:  Assisted patient in formulating new neutral/positive alternatives to challenge less helpful / ineffective thoughts and Coping Skills: Assisted patient in identifying 1-2 healthy distraction skills to reduce overall distress and Assisted patient in understanding the purpose of planning / creating / participating / sharing in positive experiences.    Patient reported mood as \"In transition. I know that's not a feeling.\" Goal for today is to \"remain calm\".  Barriers are \"me\" . Skills they will use include \"cold water\".  Patient denied substance use, and reported taking " "medications as prescribed.  Patient denied having safety concerns. Patient feels proud of weathering the \"rollercoaster\" she's been on last week or so. \"I weather it the best I can.\" Patient declined processing time. Shared that the \"Gabapentin worked great the first two weeks\" but then found out \"I shouldn't take it with lithium\" and is now on a \"smaller dose\" per her report. Also reported having scheduled her first appt with a Resident psychiatrist at [facility] on 8/2/23.    Assessment:     Patient response:   Patient responded to session by accepting feedback, giving feedback, listening, being attentive, accepting support and appearing alert     Possible barriers to participation / learning include: and no barriers identified     Health Issues:              None reported                  Substance Use Review:              Substance Use: No active concerns identified.     Mental Status/Behavioral Observations  Appearance:                            Appropriate   Eye Contact:                           Good   Psychomotor Behavior:          Normal   Attitude:                                   Cooperative  Pleasant  Orientation:                             All  Speech              Rate / Production:       Normal               Volume:                       Normal   Mood:                                      Anxious  Normal  Affect:                                      Appropriate   Thought Content:                    Clear  Thought Form:                        Coherent  Logical     Insight:                                     Good     Plan:     Safety Plan: No current safety concerns identified.  Recommended that patient call 911 or go to the local ED should there be a change in any of these risk factors.     Barriers to treatment: None identified    Patient Contracts (see media tab):  None    Substance Use: Not addressed in session     Continue or Discharge: Patient will continue in 55+ Program (55+) as planned. " Patient is likely to benefit from learning and using skills as they work toward the goals identified in their treatment plan.      Orly Durant, [facility]  June 30, 2023

## 2023-07-05 ENCOUNTER — OFFICE VISIT (OUTPATIENT)
Dept: FAMILY MEDICINE | Facility: CLINIC | Age: 68
End: 2023-07-05
Payer: MEDICARE

## 2023-07-05 ENCOUNTER — VIRTUAL VISIT (OUTPATIENT)
Dept: PSYCHOLOGY | Facility: CLINIC | Age: 68
End: 2023-07-05
Payer: MEDICARE

## 2023-07-05 VITALS
DIASTOLIC BLOOD PRESSURE: 88 MMHG | OXYGEN SATURATION: 95 % | HEIGHT: 66 IN | WEIGHT: 229.2 LBS | HEART RATE: 102 BPM | TEMPERATURE: 97.9 F | BODY MASS INDEX: 36.83 KG/M2 | SYSTOLIC BLOOD PRESSURE: 129 MMHG | RESPIRATION RATE: 17 BRPM

## 2023-07-05 DIAGNOSIS — F41.1 GENERALIZED ANXIETY DISORDER: ICD-10-CM

## 2023-07-05 DIAGNOSIS — F31.81 BIPOLAR 2 DISORDER (H): Primary | ICD-10-CM

## 2023-07-05 DIAGNOSIS — M79.642 BILATERAL HAND PAIN: ICD-10-CM

## 2023-07-05 DIAGNOSIS — E66.812 CLASS 2 OBESITY DUE TO EXCESS CALORIES WITHOUT SERIOUS COMORBIDITY WITH BODY MASS INDEX (BMI) OF 36.0 TO 36.9 IN ADULT: ICD-10-CM

## 2023-07-05 DIAGNOSIS — E66.09 CLASS 2 OBESITY DUE TO EXCESS CALORIES WITHOUT SERIOUS COMORBIDITY WITH BODY MASS INDEX (BMI) OF 36.0 TO 36.9 IN ADULT: ICD-10-CM

## 2023-07-05 DIAGNOSIS — I27.20 PULMONARY HYPERTENSION (H): ICD-10-CM

## 2023-07-05 DIAGNOSIS — E89.0 POSTABLATIVE HYPOTHYROIDISM: ICD-10-CM

## 2023-07-05 DIAGNOSIS — Z00.00 HEALTH CARE MAINTENANCE: ICD-10-CM

## 2023-07-05 DIAGNOSIS — J42 CHRONIC BRONCHITIS, UNSPECIFIED CHRONIC BRONCHITIS TYPE (H): ICD-10-CM

## 2023-07-05 DIAGNOSIS — E83.110 HEREDITARY HEMOCHROMATOSIS (H): ICD-10-CM

## 2023-07-05 DIAGNOSIS — M25.512 CHRONIC PAIN OF BOTH SHOULDERS: ICD-10-CM

## 2023-07-05 DIAGNOSIS — G89.29 CHRONIC PAIN OF BOTH SHOULDERS: ICD-10-CM

## 2023-07-05 DIAGNOSIS — G90.81 SEROTONIN SYNDROME: ICD-10-CM

## 2023-07-05 DIAGNOSIS — M25.511 CHRONIC PAIN OF BOTH SHOULDERS: ICD-10-CM

## 2023-07-05 DIAGNOSIS — M79.641 BILATERAL HAND PAIN: ICD-10-CM

## 2023-07-05 DIAGNOSIS — F43.9 TRAUMA AND STRESSOR-RELATED DISORDER: ICD-10-CM

## 2023-07-05 LAB
ALBUMIN SERPL BCG-MCNC: 4.5 G/DL (ref 3.5–5.2)
ANION GAP SERPL CALCULATED.3IONS-SCNC: 15 MMOL/L (ref 7–15)
BUN SERPL-MCNC: 10.2 MG/DL (ref 8–23)
CALCIUM SERPL-MCNC: 10 MG/DL (ref 8.8–10.2)
CHLORIDE SERPL-SCNC: 105 MMOL/L (ref 98–107)
CHOLEST SERPL-MCNC: 240 MG/DL
CREAT SERPL-MCNC: 0.65 MG/DL (ref 0.51–0.95)
DEPRECATED HCO3 PLAS-SCNC: 22 MMOL/L (ref 22–29)
FERRITIN SERPL-MCNC: 134 NG/ML (ref 11–328)
GFR SERPL CREATININE-BSD FRML MDRD: >90 ML/MIN/1.73M2
GLUCOSE SERPL-MCNC: 90 MG/DL (ref 70–99)
HDLC SERPL-MCNC: 77 MG/DL
LDLC SERPL CALC-MCNC: 138 MG/DL
NONHDLC SERPL-MCNC: 163 MG/DL
POTASSIUM SERPL-SCNC: 4.2 MMOL/L (ref 3.4–5.3)
SODIUM SERPL-SCNC: 142 MMOL/L (ref 136–145)
TRIGL SERPL-MCNC: 123 MG/DL
TSH SERPL DL<=0.005 MIU/L-ACNC: 3.18 UIU/ML (ref 0.3–4.2)

## 2023-07-05 PROCEDURE — 82040 ASSAY OF SERUM ALBUMIN: CPT | Performed by: FAMILY MEDICINE

## 2023-07-05 PROCEDURE — 80061 LIPID PANEL: CPT | Performed by: FAMILY MEDICINE

## 2023-07-05 PROCEDURE — 36415 COLL VENOUS BLD VENIPUNCTURE: CPT | Performed by: FAMILY MEDICINE

## 2023-07-05 PROCEDURE — 82728 ASSAY OF FERRITIN: CPT | Performed by: FAMILY MEDICINE

## 2023-07-05 PROCEDURE — 90834 PSYTX W PT 45 MINUTES: CPT | Mod: VID | Performed by: SOCIAL WORKER

## 2023-07-05 PROCEDURE — 99215 OFFICE O/P EST HI 40 MIN: CPT | Performed by: FAMILY MEDICINE

## 2023-07-05 PROCEDURE — 80048 BASIC METABOLIC PNL TOTAL CA: CPT | Performed by: FAMILY MEDICINE

## 2023-07-05 PROCEDURE — 84443 ASSAY THYROID STIM HORMONE: CPT | Performed by: FAMILY MEDICINE

## 2023-07-05 ASSESSMENT — PATIENT HEALTH QUESTIONNAIRE - PHQ9
SUM OF ALL RESPONSES TO PHQ QUESTIONS 1-9: 18
10. IF YOU CHECKED OFF ANY PROBLEMS, HOW DIFFICULT HAVE THESE PROBLEMS MADE IT FOR YOU TO DO YOUR WORK, TAKE CARE OF THINGS AT HOME, OR GET ALONG WITH OTHER PEOPLE: EXTREMELY DIFFICULT
10. IF YOU CHECKED OFF ANY PROBLEMS, HOW DIFFICULT HAVE THESE PROBLEMS MADE IT FOR YOU TO DO YOUR WORK, TAKE CARE OF THINGS AT HOME, OR GET ALONG WITH OTHER PEOPLE: EXTREMELY DIFFICULT
SUM OF ALL RESPONSES TO PHQ QUESTIONS 1-9: 18

## 2023-07-05 ASSESSMENT — PAIN SCALES - GENERAL: PAINLEVEL: NO PAIN (0)

## 2023-07-05 NOTE — PROGRESS NOTES
"[facility]                                     Progress Note    Patient Name: Randi Cleary  Date: 7/5/23         Service Type: Individual     Session Start Time: 3:04 PM Session End Time: 3:49 PM     Session Length: 45 minutes    Session #: 186    Attendees: Client attended alone    Service Modality:  Video Visit:      Provider verified identity through the following two step process.  Patient provided:  Patient is known previously to provider    Telemedicine Visit: The patient's condition can be safely assessed and treated via synchronous audio and visual telemedicine encounter.      Reason for Telemedicine Visit: Patient convenience (e.g. access to timely appointments / distance to available provider)    Originating Site (Patient Location): [facility] [facility] in [place]    Distant Site (Provider Location): Provider Remote Setting- Home Office    Consent:  The patient/guardian has verbally consented to: the potential risks and benefits of telemedicine (video visit) versus in person care; bill my insurance or make self-payment for services provided; and responsibility for payment of non-covered services.     Patient would like the video invitation sent by:  My Chart    Mode of Communication:  Video Conference via [employer]    Distant Location (Provider):  Off-site    As the provider I attest to compliance with applicable laws and regulations related to telemedicine.      DATA  Extended Session (53+ minutes): No  Interactive Complexity: No  Crisis: No        Progress Since Last Session (Related to Symptoms / Goals / Homework):   Symptoms: Patient is feeling low on hope lately. \"I don't think there is a day I'm not in pain, depressed, manic, or irritable at some point in the day\"      Homework: Did not complete  Contact couples counselor at [phone] -not done  Set priorities for the next day with your  after dinner but before 9, otherwise he will select his own " priorities -not done    In the future:  Look at a budget       Episode of Care Goals: Satisfactory progress - ACTION (Actively working towards change); Intervened by reinforcing change plan / affirming steps taken     Current / Ongoing Stressors and Concerns:   Patient is currently socially isolated. She has a conflictual relationship with her .  She is getting minimal physical activity.  She has had several surgeries.      Treatment Objective(s) Addressed in This Session:   Patient will increase frequency of engaging her in ADLs.  Patient will track and record at least 5 pleasant exchanges with . Patient will be able to identify at least 5 positive traits about her .  Patient will reduce level of depressive and anxious features as evidenced by reduction in score on her CHAVO-7 and PHQ-9 (scores of 15 and 16 at first measurment, respectively).     Intervention:   Supportive Therapy:  Processed appointment with her new provider. Also looked at her health and concerns she's had lately.  Talked about how she is taking charge of things and how this has made a difference with her mood.  Talked about the ongoing moments of suicidal ideation and how she is getting support in them.  Discussed how she is continuing to care for herself and how they are is no intent or plan, more frustration that things are not working out as she would like them to on a regular basis.Reviewed homework and identified successes and barriers to completion.  Discussed ways to continue to care for herself.      The following assessments were completed by patient for this visit:  PHQ9:       5/31/2023    10:02 AM 6/6/2023    10:41 AM 6/19/2023     3:05 PM 6/22/2023    10:02 AM 6/26/2023     1:15 PM 6/29/2023    10:02 AM 7/5/2023     9:57 AM   PHQ-9 SCORE   PHQ-9 Total Score [place] 17 (Moderately severe depression) 14 (Moderate depression) 10 (Moderate depression) 17 (Moderately severe depression) 21 (Severe depression) 19  (Moderately severe depression) 18 (Moderately severe depression)   PHQ-9 Total Score 17 14 10 17 21 19    19 18    18     GAD7:       3/24/2023     9:06 AM 4/7/2023    12:26 PM 4/25/2023    12:02 PM 5/5/2023     8:07 AM 5/22/2023    10:12 AM 6/6/2023    10:44 AM 6/22/2023     9:58 AM   CHAVO-7 SCORE   Total Score 15 (severe anxiety) 10 (moderate anxiety) 10 (moderate anxiety) 11 (moderate anxiety) 10 (moderate anxiety) 12 (moderate anxiety) 17 (severe anxiety)   Total Score 15    15    15    15 10 10    10 11 10 12 17    17     PROMIS 10-Global Health (only subscores and total score):       3/9/2023     9:30 AM 4/7/2023    12:28 PM 4/19/2023     8:26 AM 4/25/2023    12:04 PM 5/5/2023     8:12 AM 6/8/2023    12:05 PM 6/22/2023    10:03 AM   PROMIS-10 Scores Only   Global Mental Health Score 6    6    6 7    7    7    7 8    8    8 7    7 6 6    6 5    5   Global Physical Health Score 6    6    6 8    8    8    8 7    7    7 10    10 9 9    9 7    7   PROMIS TOTAL - SUBSCORES 12    12    12 15    15    15    15 15    15    15 17    17 15 15    15 12    12        ASSESSMENT: Current Emotional / Mental Status (status of significant symptoms):   Risk status (Self / Other harm or suicidal ideation)   Patient denies current fears or concerns for personal safety.   Patient reports the following current or recent suicidal ideation or behaviors: patient felt overwhelmed and had thoughts of ending her life, so brought her  with to the session to help with regulation and expression.   Patient denies current or recent homicidal ideation or behaviors.   Patient denies current or recent self injurious behavior or ideation.   Patient denies other safety concerns.   Patient reports there has been no change in risk factors since their last session.     Patient reports there has been no change in protective factors since their last session.     A safety and risk management plan has been developed including: Patient consented to  co-developed safety plan on 11/24/2020, updated 2/20/23.  Safety and risk management plan was reviewed.   Patient agreed to use safety plan should any safety concerns arise.  A copy was made available to the patient.     Appearance:   Appropriate    Eye Contact:   Good    Psychomotor Behavior: Normal    Attitude:   Cooperative    Orientation:   All   Speech    Rate / Production: Normal/ Responsive    Volume:  Normal    Mood:    Anxious    Affect:    Appropriate    Thought Content:  Clear    Thought Form:  Coherent    Insight:    Good      Medication Review:   No changes to current psychiatric medication(s)     Medication Compliance:   Yes     Changes in Health Issues:   None reported     Chemical Use Review:   Substance Use: Chemical use reviewed, no active concerns identified Nothing used since August 2021.     Tobacco Use: No current tobacco use.      Diagnosis:  1. Bipolar 2 disorder (H)    2. Generalized anxiety disorder    3. Trauma and stressor-related disorder      Collateral Reports Completed:   Not Applicable    PLAN: (Patient Tasks / Therapist Tasks / Other)  Contact couples counselor at [phone]  Set priorities for the next day with your  after dinner but before 9, otherwise he will select his own priorities  Get your bed finalized for the week    In the future:  Look at a budget      There has been demonstrated improvement in functioning while patient has been engaged in psychotherapy/psychological service- if withdrawn the patient would deteriorate and/or relapse.     B MICAELA BAKER, [facility]   June 26, 2023                                                        ______________________________________________________________________    Individual Treatment Plan    Patient's Name: Randi Cleary  YOB: 1955    Date of Creation: 6/30/20  Date Treatment Plan Last Reviewed/Revised: 6/6/23    DSM5 Diagnoses: 296.89 Bipolar II Disorder Depressed, 300.02 (F41.1) Generalized Anxiety  "Disorder or Adjustment Disorders  309.89 (F43.8) Other Specified Trauma and Stressor Related Disorder  Psychosocial / Contextual Factors: Patient's entire family of origin has , she now has a sister-in-law and  as support.  Relationship with  is conflictual. She is recovering from surgeries  PROMIS (reviewed every 90 days): PROMIS-10 Scores  PROMIS 10-Global Health (only subscores and total score):   PROMIS-10 Scores Only 2021 3/15/2022 3/15/2022 3/15/2022 3/24/2022 2022 2022   Global Mental Health Score 6 6 6 6 8 12 12   Global Physical Health Score 9 9 9 9 8 11 10   PROMIS TOTAL - SUBSCORES 15 15 15 15 16 23 22       Referral / Collaboration:  Referral to another professional/service is not indicated at this time..    Anticipated number of session for this episode of care: 50  Anticipation frequency of session: Biweekly  Anticipated Duration of each session: 53 or more minutes due to intensity of trauma symptoms  Treatment plan will be reviewed in 90 days or when goals have been changed.   There has been demonstrated improvement in functioning while patient has been engaged in psychotherapy/psychological service- if withdrawn the patient would deteriorate and/or relapse.       MeasurableTreatment Goal(s) related to diagnosis / functional impairment(s)  Goal 1: Patient will \"jumpstart, getting going with the things I need to be doing around the house as far as picking up, doing things, trying to do something every day.  Also to lessen the animosity between me and my .\"    I will know I've met my goal when my shoulders are fixed and I can see.      Objective #A (Patient Action)    Patient will increase frequency of engaging her in ADLs.  Status: Continued - Date(s): 12/10/21, 3/9/22, 22, 22, 22, 3/2/23, 23    Intervention(s)  Therapist will engage patient in CBT, specifically behavioral activation.    Objective #B  Patient will  track and record at least 5 " "pleasant exchanges with . Patient will be able to identify at least 5 positive traits about her  and how he relates to her.  Status: Continued - Date(s): 12/10/21, 3/9/22, 6/9/22, 9/2/22, 12/1/22, 3/2/23, 6/6/23    Intervention(s)  Therapist will teach assertiveness skills and assign homework related to relationship interactions.    Objective #C  Patient will reduce level of depressive and anxious features as evidenced by reduction in score on her CHAVO-7 and PHQ-9 (scores of 15 and 16 at first measurment, respectively).  Status: Continued - Date(s): 12/10/21, 3/9/22, 6/9/22, 9/2/22, 12/1/22, 3/2/23, 6/6/23    Intervention(s)  Therapist will engage patient in person-centered therapy and CBT.    Patient has reviewed and agreed to the above plan.      MICAELA SLADE, [facility]  June 6, 2023                                                   Randi Cleary          SAFETY PLAN:  Step 1: Warning signs / cues (Thoughts, images, mood, situation, behavior) that a crisis may be developing:  ? Thoughts: \"I don't want to continue\" \"I am unwanted\"  ? Images: none  ? Thinking Processes: ruminating  ? Mood: anger  ? Behaviors: isolating/withdrawing , can't stop crying, not taking care of myself and not taking care of my responsibilities  ? Situations: small triggers, such as not being able to find something, or dropping something   Step 2: Coping strategies - Things I can do to take my mind off of my problems without contacting another person (relaxation technique, physical activity):  ? Distress Tolerance Strategies:  arts and crafts: drawing, play with my pet , listen to positive and upbeat music: any, change body temperature (ice pack/cold water)  and paced breathing/progressive muscle relaxation  ? Physical Activities: go for a walk, deep breathing and stretching   ? Focus on helpful thoughts:  \"You've been through this before, you can get through it again.\"  Step 3: People and social settings that provide " distraction:                 Name: Carmen                            Name: Darien                           Name: Aleida       ? pool, shopping, Carmen's house, [employer]       Step 4: Remind myself of people and things that are important to me and worth living for:  Clifford Little Donna, post-COVID world, options of what could be in your future        Step 5: When I am in crisis, I can ask these people to help me use my safety plan:                 Name: Sidney  Step 6: Making the environment safe:   ? go to sleep/daydream  Step 7: Professionals or agencies I can contact during a crisis:  ? [facility] Daytime Number: [phone]  ? Suicide Prevention Lifeline: 8-274-528-TNOB (9240)  ? Crisis Text Line Service (available 24 hours a day, 7 days a week): Text [place] to 926032    Local Crisis Services: [facility] Crisis: [phone]  Adults can always access to the emPATH unit at [facility] (no phone number, utilize it like an urgent care or ER where you just show up)     Call 911 or go to my nearest emergency department.       I helped develop this safety plan and agree to use it when needed.  I have been given a copy of this plan.       Client signature _________________________________________________________________  Today s date:  11/24/2020  Adapted from Safety Plan Template 2008 Randi Poole and Robby Barba is reprinted with the express permission of the authors.  No portion of the Safety Plan Template may be reproduced without the express, written permission.  You can contact the authors at [email or web address] or [email or web address].
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
## 2023-07-06 ENCOUNTER — HOSPITAL ENCOUNTER (OUTPATIENT)
Dept: BEHAVIORAL HEALTH | Facility: CLINIC | Age: 68
Discharge: HOME | End: 2023-07-06
Attending: PSYCHIATRY & NEUROLOGY
Payer: MEDICARE

## 2023-07-06 PROCEDURE — 90853 GROUP PSYCHOTHERAPY: CPT | Performed by: SOCIAL WORKER

## 2023-07-06 ASSESSMENT — ANXIETY QUESTIONNAIRES
GAD7 TOTAL SCORE: 15
GAD7 TOTAL SCORE: 15
7. FEELING AFRAID AS IF SOMETHING AWFUL MIGHT HAPPEN: SEVERAL DAYS
GAD7 TOTAL SCORE: 15
GAD7 TOTAL SCORE: 15
2. NOT BEING ABLE TO STOP OR CONTROL WORRYING: MORE THAN HALF THE DAYS
4. TROUBLE RELAXING: NEARLY EVERY DAY
3. WORRYING TOO MUCH ABOUT DIFFERENT THINGS: MORE THAN HALF THE DAYS
1. FEELING NERVOUS, ANXIOUS, OR ON EDGE: NEARLY EVERY DAY
2. NOT BEING ABLE TO STOP OR CONTROL WORRYING: MORE THAN HALF THE DAYS
5. BEING SO RESTLESS THAT IT IS HARD TO SIT STILL: SEVERAL DAYS
IF YOU CHECKED OFF ANY PROBLEMS ON THIS QUESTIONNAIRE, HOW DIFFICULT HAVE THESE PROBLEMS MADE IT FOR YOU TO DO YOUR WORK, TAKE CARE OF THINGS AT HOME, OR GET ALONG WITH OTHER PEOPLE: VERY DIFFICULT
6. BECOMING EASILY ANNOYED OR IRRITABLE: NEARLY EVERY DAY
3. WORRYING TOO MUCH ABOUT DIFFERENT THINGS: MORE THAN HALF THE DAYS
4. TROUBLE RELAXING: NEARLY EVERY DAY
1. FEELING NERVOUS, ANXIOUS, OR ON EDGE: NEARLY EVERY DAY
5. BEING SO RESTLESS THAT IT IS HARD TO SIT STILL: SEVERAL DAYS
6. BECOMING EASILY ANNOYED OR IRRITABLE: NEARLY EVERY DAY
IF YOU CHECKED OFF ANY PROBLEMS ON THIS QUESTIONNAIRE, HOW DIFFICULT HAVE THESE PROBLEMS MADE IT FOR YOU TO DO YOUR WORK, TAKE CARE OF THINGS AT HOME, OR GET ALONG WITH OTHER PEOPLE: VERY DIFFICULT
7. FEELING AFRAID AS IF SOMETHING AWFUL MIGHT HAPPEN: SEVERAL DAYS

## 2023-07-06 NOTE — GROUP NOTE
"Process Group Note    PATIENT'S NAME: Randi Cleary  MRN:   [id number]  :   1955  ACCT. NUMBER: [id number]  DATE OF SERVICE: 23  START TIME:  1:00 PM  END TIME:  1:50 PM  FACILITATOR: Orly Durant LICSW  TOPIC:  Process Group    Diagnoses:  296.33 (F33.2) Major Depressive Disorder, Recurrent Episode, Severe With anxious distress  300.02 (F41.1) Generalized Anxiety Disorder    Rule out:  296.89 Bipolar II Disorder vs. 314.01 (F90.2) Attention-Deficit/Hyperactivity Disorder   Combined presentation      [facility] 55+ Program  TRACK: [facility] 1    NUMBER OF PARTICIPANTS: 3        Data:    Session content: At the start of this group, patients were invited to check in by identifying themselves, describing their current emotional status, and identifying issues to address in this group.   Area(s) of treatment focus addressed in this session included Symptom Management and Develop Socialization / Interpersonal Relationship Skills.    Patient reported mood as \"dizzy again\" during check-in. Goal for today is to none noted.  Barriers are: n/a. Skills they will use include n/a.  Patient denied substance use, and reported taking medications as prescribed.  Patient reported passive SI, no intent or plan, denied safety concerns.  Patient reports feeling grateful that new provider (Gerontologist) asked helpful questions. Patient shared with group during check-in and received feedback and support. Writer noted how patient's survival brain is interpreting information related to medical providers as a threat. Noted this is outside of her awareness, and once aware, can consider other points of view/check the facts. Writer wondered if the dizziness and nausea patient is reporting is at least partially related to trauma-related anxiety (since pt states she is taking little medication and doesn't see how what she does take for her mental health could be causing problems). Pt was open to this " "possibility and receptive to Writer's explanation of how her brain has a 'story' related to depression and trauma that it is telling her and rejecting things that don't fit, and grabbing on to negative events (perceived or otherwise) as proof that her brain's 'story' is true. Pt was also receptive to this and said Writer had it about \"75% right.\" Validated her responses as deeply connected to her very real traumatic experiences, and she was willing to consider she may not be taking in all the information or hearing through a filter based on her Hx of trauma.     Therapeutic Interventions/Treatment Strategies:  Psychotherapist offered support, feedback and validation and reinforced use of skills. Treatment modalities used include Cognitive Behavioral Therapy and Dialectical Behavioral Therapy. Interventions include Cognitive Restructuring:  Explored impact of ineffective thoughts / distortions on mood and activity, Coping Skills: Discussed how the use of intentional \"in the moment\" actions can help reduce distress and Emotions Management:  Reinforced the purpose and biological basis of emotions and Increased awareness of daily mood patterns/changes.    Assessment:    Patient response:   Patient responded to session by accepting feedback, giving feedback, listening, being attentive, accepting support and appearing alert     Possible barriers to participation / learning include: and no barriers identified     Health Issues:              None reported                  Substance Use Review:              Substance Use: No active concerns identified.     Mental Status/Behavioral Observations  Appearance:                            Appropriate   Eye Contact:                           Good   Psychomotor Behavior:          Normal   Attitude:                                   Cooperative  Pleasant  Orientation:                             All  Speech              Rate / Production:       Normal               Volume:            "            Normal   Mood:                                      Anxious  Normal  Affect:                                      Appropriate   Thought Content:                    Clear  Patient reported passive SI, no intent or plan, denied safety concerns.  Thought Form:                        Coherent  Logical     Insight:                                     Good     Patient response:   Patient responded to session by accepting feedback, listening, being attentive, accepting support and appearing alert    Possible barriers to participation / learning include: and no barriers identified    Health Issues:   None reported       Substance Use Review:   Substance Use: No active concerns identified.    Mental Status/Behavioral Observations  Appearance:   Appropriate   Eye Contact:   Good   Psychomotor Behavior: Normal   Attitude:   Cooperative  Interested Pleasant  Orientation:   All  Speech   Rate / Production: Normal    Volume:  Normal   Mood:    Dysphoric and anxious  Affect:    Appropriate  Tearful  Thought Content:   Clear and Safety reports  presence of suicidal ideation passive suicidal ideation , denies intent or plan  Thought Form:  Coherent     Insight:    Good  and Fair     Plan:     Safety Plan: Recommended that patient call 911 or go to the local ED should there be a change in any of these risk factors.    Committed to safety and agreed to follow previously developed safety coping plan.      Barriers to treatment: None identified    Patient Contracts (see media tab):  None    Substance Use: Not addressed in session     Continue or Discharge: Patient will continue in 55+ Program (55+) as planned. Patient is likely to benefit from learning and using skills as they work toward the goals identified in their treatment plan.      Orly Durant, [facility]  July 6, 2023

## 2023-07-06 NOTE — GROUP NOTE
Psychotherapy Group Note    PATIENT'S NAME: Randi Cleary  MRN:   [id number]  :   1955  ACCT. NUMBER: [id number]  DATE OF SERVICE: 23  START TIME:  2:00 PM  END TIME:  2:50 PM  FACILITATOR: Orly Durant LICSW  TOPIC: MH EBP Group: Self-Awareness  [facility] 55+ Program  TRACK: Clinic 1    NUMBER OF PARTICIPANTS: 3    Summary of Group / Topics Discussed:  Self-Awareness: Personal Strengths: Topic focused on assisting patients in identifying personal strengths and how they relate to the management of mental health symptoms. Patients discussed the benefits of acknowledging their personal strengths and their impact on mood improvement, mindfulness, and perspective. Patients worked to increase time spent on recognition and appreciation of what is positive and working in their lives. The goal is to reduce rumination and negative thinking resulting in increased mindfulness and resilience. Patients will work to put skills into practice and problem-solve barriers.     Patient Session Goals / Objectives:    Identified personal strengths    Identified barriers to recognition of personal strengths    Verbalized understanding of strategies to increase use of their strengths in management of daily symptoms      Patient Participation / Response:  Fully participated with the group by sharing personal reflections / insights and openly received / provided feedback with other participants.    Demonstrated understanding of topics discussed through group discussion and participation    Treatment Plan:  Patient has a current master individualized treatment plan.  See Epic treatment plan for more information.    ASHKAN Reeves

## 2023-07-08 ENCOUNTER — MYC MEDICAL ADVICE (OUTPATIENT)
Dept: INTERNAL MEDICINE | Facility: CLINIC | Age: 68
End: 2023-07-08
Payer: MEDICARE

## 2023-07-10 ENCOUNTER — VIRTUAL VISIT (OUTPATIENT)
Dept: PSYCHOLOGY | Facility: CLINIC | Age: 68
End: 2023-07-10
Payer: MEDICARE

## 2023-07-10 DIAGNOSIS — F31.81 BIPOLAR 2 DISORDER (H): Primary | ICD-10-CM

## 2023-07-10 DIAGNOSIS — F43.9 TRAUMA AND STRESSOR-RELATED DISORDER: ICD-10-CM

## 2023-07-10 DIAGNOSIS — F41.1 GENERALIZED ANXIETY DISORDER: ICD-10-CM

## 2023-07-10 PROCEDURE — 90837 PSYTX W PT 60 MINUTES: CPT | Mod: VID | Performed by: SOCIAL WORKER

## 2023-07-10 NOTE — PROGRESS NOTES
[facility]                                     Progress Note    Patient Name: Randi Cleary  Date: 7/10/23         Service Type: Individual     Session Start Time: 10:05 AM Session End Time: 10:58 AM     Session Length: 53 minutes    Session #: 187    Attendees: Client attended alone    Service Modality:  Video Visit:      Provider verified identity through the following two step process.  Patient provided:  Patient is known previously to provider    Telemedicine Visit: The patient's condition can be safely assessed and treated via synchronous audio and visual telemedicine encounter.      Reason for Telemedicine Visit: Patient convenience (e.g. access to timely appointments / distance to available provider)    Originating Site (Patient Location): Patient's home    Distant Site (Provider Location): Provider Remote Setting- Home Office    Consent:  The patient/guardian has verbally consented to: the potential risks and benefits of telemedicine (video visit) versus in person care; bill my insurance or make self-payment for services provided; and responsibility for payment of non-covered services.     Patient would like the video invitation sent by:  My Chart    Mode of Communication:  Video Conference via [facility]    Distant Location (Provider):  Off-site    As the provider I attest to compliance with applicable laws and regulations related to telemedicine.      DATA  Extended Session (53+ minutes): No  Interactive Complexity: No  Crisis: No        Progress Since Last Session (Related to Symptoms / Goals / Homework):   Symptoms: Patient has been feeling frustrated and overwhelmed.      Homework: Did not complete  Contact couples counselor at [phone]  Set priorities for the next day with your  after dinner but before 9, otherwise he will select his own priorities  Get your bed finalized for the week    In the future:  Look at a budget       Episode of Care Goals: Satisfactory  progress - ACTION (Actively working towards change); Intervened by reinforcing change plan / affirming steps taken     Current / Ongoing Stressors and Concerns:   Patient is currently socially isolated. She has a conflictual relationship with her .  She is getting minimal physical activity.  She has had several surgeries.      Treatment Objective(s) Addressed in This Session:   Patient will increase frequency of engaging her in ADLs.  Patient will track and record at least 5 pleasant exchanges with . Patient will be able to identify at least 5 positive traits about her .  Patient will reduce level of depressive and anxious features as evidenced by reduction in score on her CHAVO-7 and PHQ-9 (scores of 15 and 16 at first measurment, respectively).     Intervention:   Supportive Therapy:  Processed patient's frustration in trying to get her psychiatry records. Talked about her physical health concerns and how age plays into this, processing how this feels to recognize that age may play a part. Discussed suicidal thoughts and how they continue to persist, but how she's managing them. Reviewed her safety plan, looking specifically at the [facility] program.      The following assessments were completed by patient for this visit:  PHQ9:       5/31/2023    10:02 AM 6/6/2023    10:41 AM 6/19/2023     3:05 PM 6/22/2023    10:02 AM 6/26/2023     1:15 PM 6/29/2023    10:02 AM 7/5/2023     9:57 AM   PHQ-9 SCORE   PHQ-9 Total Score MyChart 17 (Moderately severe depression) 14 (Moderate depression) 10 (Moderate depression) 17 (Moderately severe depression) 21 (Severe depression) 19 (Moderately severe depression) 18 (Moderately severe depression)   PHQ-9 Total Score 17 14 10 17 21 19    19 18    18     GAD7:       4/7/2023    12:26 PM 4/25/2023    12:02 PM 5/5/2023     8:07 AM 5/22/2023    10:12 AM 6/6/2023    10:44 AM 6/22/2023     9:58 AM 7/6/2023     9:11 AM   CHAVO-7 SCORE   Total Score 10 (moderate anxiety) 10  (moderate anxiety) 11 (moderate anxiety) 10 (moderate anxiety) 12 (moderate anxiety) 17 (severe anxiety) 15 (severe anxiety)   Total Score 10 10    10 11 10 12 17    17 15    15     PROMIS 10-Global Health (only subscores and total score):       4/7/2023    12:28 PM 4/19/2023     8:26 AM 4/25/2023    12:04 PM 5/5/2023     8:12 AM 6/8/2023    12:05 PM 6/22/2023    10:03 AM 7/6/2023     9:12 AM   PROMIS-10 Scores Only   Global Mental Health Score 7    7    7    7 8    8    8 7    7 6 6    6 5    5 5    5   Global Physical Health Score 8    8    8    8 7    7    7 10    10 9 9    9 7    7 8    8   PROMIS TOTAL - SUBSCORES 15    15    15    15 15    15    15 17    17 15 15    15 12    12 13    13        ASSESSMENT: Current Emotional / Mental Status (status of significant symptoms):   Risk status (Self / Other harm or suicidal ideation)   Patient denies current fears or concerns for personal safety.   Patient reports the following current or recent suicidal ideation or behaviors: patient has persistent suicidal thoughts, no change.   Patient denies current or recent homicidal ideation or behaviors.   Patient denies current or recent self injurious behavior or ideation.   Patient denies other safety concerns.   Patient reports there has been no change in risk factors since their last session.     Patient reports there has been no change in protective factors since their last session.     A safety and risk management plan has been developed including: Patient consented to co-developed safety plan on 11/24/2020, updated 2/20/23.  Safety and risk management plan was reviewed.   Patient agreed to use safety plan should any safety concerns arise.  A copy was made available to the patient.     Appearance:   Appropriate    Eye Contact:   Good    Psychomotor Behavior: Normal    Attitude:   Cooperative    Orientation:   All   Speech    Rate / Production: Normal/ Responsive    Volume:  Normal    Mood:    Anxious     Affect:    Appropriate    Thought Content:  Clear    Thought Form:  Coherent    Insight:    Good      Medication Review:   No changes to current psychiatric medication(s)     Medication Compliance:   Yes     Changes in Health Issues:   None reported     Chemical Use Review:   Substance Use: Chemical use reviewed, no active concerns identified Nothing used since 2021.     Tobacco Use: No current tobacco use.      Diagnosis:  1. Bipolar 2 disorder (H)    2. Generalized anxiety disorder    3. Trauma and stressor-related disorder      Collateral Reports Completed:   Not Applicable    PLAN: (Patient Tasks / Therapist Tasks / Other)  Contact couples counselor at [phone]  Set priorities for the next day with your  after dinner but before 9, otherwise he will select his own priorities  Get your bed finalized for the week  Use safety plan as needed.    In the future:  Look at a budget      There has been demonstrated improvement in functioning while patient has been engaged in psychotherapy/psychological service- if withdrawn the patient would deteriorate and/or relapse.     MICAELA SLADE, [facility]   July 10, 2023                                                        ______________________________________________________________________    Individual Treatment Plan    Patient's Name: Randi Cleary  YOB: 1955    Date of Creation: 20  Date Treatment Plan Last Reviewed/Revised: 23    DSM5 Diagnoses: 296.89 Bipolar II Disorder Depressed, 300.02 (F41.1) Generalized Anxiety Disorder or Adjustment Disorders  309.89 (F43.8) Other Specified Trauma and Stressor Related Disorder  Psychosocial / Contextual Factors: Patient's entire family of origin has , she now has a sister-in-law and  as support.  Relationship with  is conflictual. She is recovering from surgeries  PROMIS (reviewed every 90 days): PROMIS-10 Scores  PROMIS 10-Global Health (only subscores and total  "score):   PROMIS-10 Scores Only 12/14/2021 3/15/2022 3/15/2022 3/15/2022 3/24/2022 4/1/2022 6/9/2022   Global Mental Health Score 6 6 6 6 8 12 12   Global Physical Health Score 9 9 9 9 8 11 10   PROMIS TOTAL - SUBSCORES 15 15 15 15 16 23 22       Referral / Collaboration:  Referral to another professional/service is not indicated at this time..    Anticipated number of session for this episode of care: 50  Anticipation frequency of session: Biweekly  Anticipated Duration of each session: 53 or more minutes due to intensity of trauma symptoms  Treatment plan will be reviewed in 90 days or when goals have been changed.   There has been demonstrated improvement in functioning while patient has been engaged in psychotherapy/psychological service- if withdrawn the patient would deteriorate and/or relapse.       MeasurableTreatment Goal(s) related to diagnosis / functional impairment(s)  Goal 1: Patient will \"jumpstart, getting going with the things I need to be doing around the house as far as picking up, doing things, trying to do something every day.  Also to lessen the animosity between me and my .\"    I will know I've met my goal when my shoulders are fixed and I can see.      Objective #A (Patient Action)    Patient will increase frequency of engaging her in ADLs.  Status: Continued - Date(s): 12/10/21, 3/9/22, 6/9/22, 9/2/22, 12/1/22, 3/2/23, 6/6/23    Intervention(s)  Therapist will engage patient in CBT, specifically behavioral activation.    Objective #B  Patient will  track and record at least 5 pleasant exchanges with . Patient will be able to identify at least 5 positive traits about her  and how he relates to her.  Status: Continued - Date(s): 12/10/21, 3/9/22, 6/9/22, 9/2/22, 12/1/22, 3/2/23, 6/6/23    Intervention(s)  Therapist will teach assertiveness skills and assign homework related to relationship interactions.    Objective #C  Patient will reduce level of depressive and anxious " "features as evidenced by reduction in score on her CHAVO-7 and PHQ-9 (scores of 15 and 16 at first measurment, respectively).  Status: Continued - Date(s): 12/10/21, 3/9/22, 6/9/22, 9/2/22, 12/1/22, 3/2/23, 6/6/23    Intervention(s)  Therapist will engage patient in person-centered therapy and CBT.    Patient has reviewed and agreed to the above plan.      MICAELA SLADE, [facility]  June 6, 2023                                                   Randi Cleary          SAFETY PLAN:  Step 1: Warning signs / cues (Thoughts, images, mood, situation, behavior) that a crisis may be developing:  ? Thoughts: \"I don't want to continue\" \"I am unwanted\"  ? Images: none  ? Thinking Processes: ruminating  ? Mood: anger  ? Behaviors: isolating/withdrawing , can't stop crying, not taking care of myself and not taking care of my responsibilities  ? Situations: small triggers, such as not being able to find something, or dropping something   Step 2: Coping strategies - Things I can do to take my mind off of my problems without contacting another person (relaxation technique, physical activity):  ? Distress Tolerance Strategies:  arts and crafts: drawing, play with my pet , listen to positive and upbeat music: any, change body temperature (ice pack/cold water)  and paced breathing/progressive muscle relaxation  ? Physical Activities: go for a walk, deep breathing and stretching   ? Focus on helpful thoughts:  \"You've been through this before, you can get through it again.\"  Step 3: People and social settings that provide distraction:                 Name: Carmen                            Name: Darien                           Name: Aleida       ? pool, shopping, Carmen's house, [employer]       Step 4: Remind myself of people and things that are important to me and worth living for:  Clifford Little Donna, post-COVID world, options of what could be in your future        Step 5: When I am in crisis, I can ask these people to help me use my " safety plan:                 Name: Sidney  Step 6: Making the environment safe:   ? go to sleep/daydream  Step 7: Professionals or agencies I can contact during a crisis:  ? [facility] Daytime Number: [phone]  ? Suicide Prevention Lifeline: 2-462-934-TALK (8255)  ? Crisis Text Line Service (available 24 hours a day, 7 days a week): Text [place] to 362177    Local Crisis Services: [facility] Crisis: [phone]  Adults can always access to the emPATH unit at [facility] (no phone number, utilize it like an urgent care or ER where you just show up)     Call 911 or go to my nearest emergency department.       I helped develop this safety plan and agree to use it when needed.  I have been given a copy of this plan.       Client signature _________________________________________________________________  Today s date:  11/24/2020  Adapted from Safety Plan Template 2008 Randi Poole and Robby Barba is reprinted with the express permission of the authors.  No portion of the Safety Plan Template may be reproduced without the express, written permission.  You can contact the authors at [email or web address] or [email or web address].

## 2023-07-13 ENCOUNTER — HOSPITAL ENCOUNTER (OUTPATIENT)
Dept: BEHAVIORAL HEALTH | Facility: CLINIC | Age: 68
Discharge: HOME | End: 2023-07-13
Attending: PSYCHIATRY & NEUROLOGY
Payer: MEDICARE

## 2023-07-13 ENCOUNTER — TRANSFERRED RECORDS (OUTPATIENT)
Dept: HEALTH INFORMATION MANAGEMENT | Facility: CLINIC | Age: 68
End: 2023-07-13
Payer: MEDICARE

## 2023-07-13 PROCEDURE — 90853 GROUP PSYCHOTHERAPY: CPT

## 2023-07-13 NOTE — GROUP NOTE
Psychotherapy Group Note    PATIENT'S NAME: Randi Cleary  MRN:   [id number]  :   1955  ACCT. NUMBER: [id number]  DATE OF SERVICE: 23  START TIME:  2:00 PM  END TIME:  2:50 PM  FACILITATOR: Patsy Mac LGSW  TOPIC: MH EBP Group: Self-Awareness  [facility] Day [facility]  TRACK: [facility]    NUMBER OF PARTICIPANTS: 2    Summary of Group / Topics Discussed:  Self-Awareness: Self-Compassion: Patients received overview of key concepts in developing self-compassion. Patients discussed mindfulness, self-kindness, and finding common humanity. Patients identified their current approach to problems in their lives and learned skills for increasing self-compassion. Patients identified ways they can put self-compassion skills into practice and problem solve barriers to application of skills.     Patient Session Goals / Objectives:    [place] components of self-compassion    Identify ways to practice self-compassion in daily life    Problem solve barriers to self-compassion practice      Patient Participation / Response:  Fully participated with the group by sharing personal reflections / insights and openly received / provided feedback with other participants.    Demonstrated understanding of topics discussed through group discussion and participation, Demonstrated understanding of values, strengths, and challenges to learn about themselves and Identified / Expressed readiness to act intentionally, increase self-compassion, promote personal growth    Treatment Plan:  Patient has a current master individualized treatment plan.  See Epic treatment plan for more information.    ROSY Juan

## 2023-07-13 NOTE — GROUP NOTE
Process Group Note    PATIENT'S NAME: Randi Cleary  MRN:   [id number]  :   1955  ACCT. NUMBER: [id number]  DATE OF SERVICE: 23  START TIME:  1:00 PM  END TIME:  1:50 PM  FACILITATOR: Patsy Mac LGSW  TOPIC:  Process Group    Diagnoses:  296.33 (F33.2) Major Depressive Disorder, Recurrent Episode, Severe With anxious distress  300.02 (F41.1) Generalized Anxiety Disorder    Rule out:  296.89 Bipolar II Disorder vs. 314.01 (F90.2) Attention-Deficit/Hyperactivity Disorder   Combined presentation    [facility]+ Program  TRACK: [facility]    NUMBER OF PARTICIPANTS: 2          Data:    Session content: At the start of this group, patients were invited to check in by identifying themselves, describing their current emotional status, and identifying issues to address in this group.   Area(s) of treatment focus addressed in this session included Symptom Management, Personal Safety and Community Resources/Discharge Planning.  Client denied safety concerns, including SI and SIB. Client denies any chemical use.  Client is taking medications as prescribed. Client reported feeling good and hopeful. Client stated she feels hopeful with her new providers. Client reported she will be seeing a new provider to work on her mobility and possibly pain management. Peers offered supportive feedback and validation.      Therapeutic Interventions/Treatment Strategies:  Psychotherapist offered support, feedback and validation. Treatment modalities used include Cognitive Behavioral Therapy. Interventions include Relationship Skills: having friends come to medical appointments for assistance with information and advocacy.    Assessment:    Patient response:   Patient responded to session by accepting feedback, giving feedback, listening and focusing on goals    Possible barriers to participation / learning include: and no barriers identified    Health Issues:   None reported       Substance Use  Review:   Substance Use: No active concerns identified.    Mental Status/Behavioral Observations  Appearance:   Appropriate   Eye Contact:   Good   Psychomotor Behavior: Normal   Attitude:   Cooperative  Interested Friendly Pleasant  Orientation:   All  Speech   Rate / Production: Normal/ Responsive   Volume:  Normal   Mood:    Depressed  Sad   Affect:    Appropriate   Thought Content:   Clear and Safety denies any current safety concerns including suicidal ideation, self-harm, and homicidal ideation  Thought Form:  Coherent  Logical     Insight:    Good     Plan:     Safety Plan: No current safety concerns identified.  Recommended that patient call 911 or go to the local ED should there be a change in any of these risk factors.     Barriers to treatment: None identified    Patient Contracts (see media tab):  None    Substance Use: Not addressed in session     Continue or Discharge: Patient will continue in 55+ Program (55+) as planned. Patient is likely to benefit from learning and using skills as they work toward the goals identified in their treatment plan.      ROSY Juan  July 13, 2023

## 2023-07-14 ENCOUNTER — OFFICE VISIT (OUTPATIENT)
Dept: FAMILY MEDICINE | Facility: CLINIC | Age: 68
End: 2023-07-14
Payer: MEDICARE

## 2023-07-14 VITALS
DIASTOLIC BLOOD PRESSURE: 85 MMHG | BODY MASS INDEX: 36.8 KG/M2 | OXYGEN SATURATION: 95 % | WEIGHT: 229 LBS | HEART RATE: 90 BPM | TEMPERATURE: 97.9 F | RESPIRATION RATE: 17 BRPM | SYSTOLIC BLOOD PRESSURE: 138 MMHG | HEIGHT: 66 IN

## 2023-07-14 DIAGNOSIS — E11.9 TYPE 2 DIABETES MELLITUS WITHOUT COMPLICATION, WITHOUT LONG-TERM CURRENT USE OF INSULIN (H): Primary | ICD-10-CM

## 2023-07-14 DIAGNOSIS — Z23 NEED FOR DIPHTHERIA-TETANUS-PERTUSSIS (TDAP) VACCINE: ICD-10-CM

## 2023-07-14 DIAGNOSIS — K42.9 UMBILICAL HERNIA WITHOUT OBSTRUCTION AND WITHOUT GANGRENE: ICD-10-CM

## 2023-07-14 DIAGNOSIS — Z23 ENCOUNTER FOR ADMINISTRATION OF COVID-19 VACCINE: ICD-10-CM

## 2023-07-14 DIAGNOSIS — Z23 ENCOUNTER FOR ADMINISTRATION OF VACCINE: ICD-10-CM

## 2023-07-14 LAB
CREAT UR-MCNC: 39 MG/DL
HBA1C MFR BLD: 5.8 % (ref 0–5.6)
MICROALBUMIN UR-MCNC: <12 MG/L
MICROALBUMIN/CREAT UR: NORMAL MG/G{CREAT}

## 2023-07-14 PROCEDURE — 90471 IMMUNIZATION ADMIN: CPT

## 2023-07-14 PROCEDURE — 91313 COVID-19 BIVALENT 18+ (MODERNA): CPT

## 2023-07-14 PROCEDURE — 99214 OFFICE O/P EST MOD 30 MIN: CPT | Mod: 25

## 2023-07-14 PROCEDURE — 82043 UR ALBUMIN QUANTITATIVE: CPT

## 2023-07-14 PROCEDURE — 36415 COLL VENOUS BLD VENIPUNCTURE: CPT

## 2023-07-14 PROCEDURE — 83036 HEMOGLOBIN GLYCOSYLATED A1C: CPT

## 2023-07-14 PROCEDURE — 82570 ASSAY OF URINE CREATININE: CPT

## 2023-07-14 PROCEDURE — 0134A COVID-19 BIVALENT 18+ (MODERNA): CPT

## 2023-07-14 PROCEDURE — 90715 TDAP VACCINE 7 YRS/> IM: CPT

## 2023-07-14 ASSESSMENT — PAIN SCALES - GENERAL: PAINLEVEL: NO PAIN (0)

## 2023-07-14 NOTE — PATIENT INSTRUCTIONS
"Patient Education   Here is the plan from today's visit    Umbilical Hernia, uncomplicated  Easily reducible, small (1\") umbilical hernia. This is a common change of the abdominal wall getting weaker as adults age and with increased weight around the abdomen. The concern is if it will not so easily push back into the abdomen with mild pressure from hand massage as a portion of the bowels may become stuck in the opening which is life threatening. Red Flags to watch out for are increased abdominal pain, distension, fevers, chills, nausea, vomiting, constipation, failure to pass gas and bloody or black stools. Please see the Emergency Department right away for those findings. We discussed how surgical correction will often require revisions and surgeons often will not operate on uncomplicated hernias.     2. Type 2 diabetes mellitus without complication, without long-term current use of insulin (H)  Diabetes have been well controlled with diet alone which is remarkable. I'm very proud of this achievement for you. We discussed how prescription statins can reduce cardiovascular risk of 15% over ten years   - Albumin Random Urine Quantitative with Creat Ratio; Future  - Hemoglobin A1c; Future  - Albumin Random Urine Quantitative with Creat Ratio  - Hemoglobin A1c    3. Moderna Covid vaccine  4. Tdap vaccine    Please call or return to clinic if your symptoms don't go away.    Follow up plan  No follow-ups on file.    Thank you for coming to [facility] today.  Lab Testing:  **If you had lab testing today and your results are reassuring or normal they will be mailed to you or sent through [employer] [facility] within 7 days.   **If the lab tests need quick action we will call you with the results.  **If you are having labs done on a different day, please call [phone] to schedule at [facility] or [phone] for other [facility] locations. Labs do not offer walk-in appointments.  The phone number we will " call with results is # [phone] (home) [phone] (work). If this is not the best number please call our clinic and change the number.  Medication Refills:  If you need any refills please call your pharmacy and they will contact us.   If you need to  your refill at a new pharmacy, please contact the new pharmacy directly. The new pharmacy will help you get your medications transferred faster.   Scheduling:  If you have any concerns about today's visit or wish to schedule another appointment please call our office during normal business hours [phone] (8-5:00 M-F). If you can no longer make a scheduled visit, please cancel via [employer] or call us to cancel.   If a referral was made to an [facility] specialty provider and you do not get a call from central scheduling, please refer to directions on your visit summary or call our office during normal business hours for assistance.   If a Mammogram was ordered for you at the [facility] call [phone] to schedule or change your appointment.  If you had an XRay/CT/Ultrasound/MRI ordered the number is [phone] to schedule or change your radiology appointment.   [facility] has limited ultrasound appointments available on Wednesdays, if you would like your ultrasound at [facility], please call [phone] to schedule.   Medical Concerns:  If you have urgent medical concerns please call [phone] at any time of the day.    Juan Grady MD

## 2023-07-14 NOTE — PROGRESS NOTES
Assessment & Plan      Randi Cleary is a 67 year old female with a past medical history of morbid obesity, KYAW c/by pulmonary hypertension, COPD, hereditary hemochromatosis, type 2 diabetes without long term insulin, GERD c/by hiatal hernia, chronic pain on opioids, hypothyroidism s/p ablation and anxiety and depression who presents for noncomplicated small umbilical hernia.     Umbilical hernia without obstruction and without gangrene  Small palpable mass easily reducible with palpation.  Low concern for strangulation or incarceration at this time given minor abdominal pain, vitals within normal limits, well appearing patient with no reported obstipation, nausea or emesis. Patient educated about natural course of umbilical hernias and concerning signs and symptoms to seek immediate medical attention. Patient voiced understanding of her condition.   -Follow up as needed    Type 2 diabetes mellitus without complication, without long-term current use of insulin (H)  Hx/o Hereditary Hematochromatosis  Hyperlipidemia  Follows with Hematology. MRI on 11/9/20 without signs of iron overload. A1c downtrend from 5.9 on 1/6/21 with stable downtrend to 5.6 on 1/18/23 with strict diet only and normal limit serial ferritin, last value of 75 on 1/18/23.  A1c today at 5.8 meeting prediabetes range without significant microalbinuria is reassuring that non medical regimen for sugars is appropriate at this time. Future consideration of a GLP-1 inhibitor for A1c >6.4% for weight loss benefit. Given a ASCVD score of 15% with hyperlipidemia, risk would be reduced with a statin which the patient respectlfully declines at this time.   - Albumin Random Urine Quantitative with Creat Ratio; Future  - Hemoglobin A1c; Future  - Albumin Random Urine Quantitative with Creat Ratio  - Hemoglobin A1c    Encounter for administration of COVID-19 vaccine  Last Moderna bivalent on 11/2022. Given pulmonary comorbidity, would benefit from booster.  "  - COVID-19,PF,MODERNA BIVALENT 18+Yrs    Encounter for administration of vaccine  Last Tdap in system on 5/2008, due for booster.  - TDAP VACCINE (Adacel, Boostrix)  [[id number]]           BMI:   Estimated body mass index is 36.96 kg/m  as calculated from the following:    Height as of this encounter: 1.676 m (5' 6\").    Weight as of this encounter: 103.9 kg (229 lb).        Return if symptoms worsen or fail to improve.    Juan Grady MD  [facility] SAM Zhou is a 67 year old, presenting for the following health issues:   Follow Up (Possible hernia /)        1/11/2023     1:19 PM   Additional Questions   Roomed by Missy BRADLEY     The patient reports a one week history of a bulge at her abdomen since moving objects around her house. Described as not really pain but a discomfort sensation, 0.5/10 scale and feels like gas. Location to umbilicus and lower abdomen/pelvis bilaterally. Coughing or sneezing makes sensation/bulge look worse. Endorses distension and loose stool (slightly increased from baseline, currently on magnesium supplement for RLS). Denies fevers, chills, nausea, vomiting, bloody or black stools, constipation and inability to pass gas.     Notes getting out of house everyday is an issue, leading to panic attacks, but mood has been improving with recent therapist follow up.     Diabetes currently controlled with only dietary control, strictly avoiding sugary meals and treats.     Would like the Covid vaccine and Tdap vaccines today. Worried about taking too many vaccines at once with the shingles vaccine.     Reports overall good adherence with current prescription medications. No diuretics taken yesterday but resumes today. Continuing ethancyrnic acid tab for pulmonary hypertension every other day as directed.     Review of Systems   Constitutional, HEENT, cardiovascular, pulmonary, GI, , musculoskeletal, neuro, skin, endocrine and psych systems are negative, " "except as otherwise noted.      Objective    /85   Pulse 90   Temp 97.9  F (36.6  C)   Resp 17   Ht 1.676 m (5' 6\")   Wt 103.9 kg (229 lb)   SpO2 95%   BMI 36.96 kg/m    There is no height or weight on file to calculate BMI.  Physical Exam   GENERAL: Well dressed, no apparent distress  EYES: Clear conjunctivae, normal sclerae  RESP: Non labored breathing, good air exchange, intermittent mild generalized rhonchi and wheezes.  CV: regular rate and rhythm, normal S1 S2, no murmur appreciated  ABDOMEN: Obese abdomen. Soft, non distended, bowel sounds normal, 1\" soft round mass protruding from umbilicus reduced with mild pressure on exam. Slight tenderness to palpation just inferior to umbilicus, no rebound or guarding, no hepatosplenomegaly appreciated.  Skin: No acute rashes or ecchymosis appreciated on abdomen  MS: No gross abnormalities, ambulates well from chair to exam table  Neuro: Alert and oriented, CN II-XII grossly intact, coordinated movement of all extremities  Psych: Good eye contact, calm to intermittently anxious    Results for orders placed or performed in visit on 07/14/23   Albumin Random Urine Quantitative with Creat Ratio     Status: None   Result Value Ref Range    Creatinine Urine mg/dL 39.0 mg/dL    Albumin Urine mg/L <12.0 mg/L    Albumin Urine mg/g Cr     Hemoglobin A1c     Status: Abnormal   Result Value Ref Range    Hemoglobin A1C 5.8 (H) 0.0 - 5.6 %             Juan Grady MD  [facility], PGY-1      "

## 2023-07-14 NOTE — Clinical Note
Zack Martinez  Reminder to send me your notes when you are done with them. Looks like this note is not quite done since you did not choose a level of service.  Otherwise it looks done.  Can you get back to me with the note complete or any questions,  Thanks,  Rochelle

## 2023-07-17 ENCOUNTER — TRANSFERRED RECORDS (OUTPATIENT)
Dept: HEALTH INFORMATION MANAGEMENT | Facility: CLINIC | Age: 68
End: 2023-07-17
Payer: MEDICARE

## 2023-07-18 ENCOUNTER — VIRTUAL VISIT (OUTPATIENT)
Dept: PSYCHOLOGY | Facility: CLINIC | Age: 68
End: 2023-07-18
Payer: MEDICARE

## 2023-07-18 DIAGNOSIS — F41.1 GENERALIZED ANXIETY DISORDER: ICD-10-CM

## 2023-07-18 DIAGNOSIS — F31.81 BIPOLAR 2 DISORDER (H): Primary | ICD-10-CM

## 2023-07-18 DIAGNOSIS — F43.9 TRAUMA AND STRESSOR-RELATED DISORDER: ICD-10-CM

## 2023-07-18 PROCEDURE — 90837 PSYTX W PT 60 MINUTES: CPT | Mod: VID | Performed by: SOCIAL WORKER

## 2023-07-18 NOTE — PROGRESS NOTES
[facility]                                     Progress Note    Patient Name: Randi Cleary  Date: 7/18/23         Service Type: Individual     Session Start Time: 1:37 PM Session End Time: 2:30 PM     Session Length: 53 minutes    Session #: 188    Attendees: Client attended alone    Service Modality:  Video Visit:      Provider verified identity through the following two step process.  Patient provided:  Patient is known previously to provider    Telemedicine Visit: The patient's condition can be safely assessed and treated via synchronous audio and visual telemedicine encounter.      Reason for Telemedicine Visit: Patient convenience (e.g. access to timely appointments / distance to available provider)    Originating Site (Patient Location): Patient's home    Distant Site (Provider Location): [facility] [place]    Consent:  The patient/guardian has verbally consented to: the potential risks and benefits of telemedicine (video visit) versus in person care; bill my insurance or make self-payment for services provided; and responsibility for payment of non-covered services.     Patient would like the video invitation sent by:  My Chart    Mode of Communication:  [facility] Conference [facility]    Distant Location (Provider):  On-site    As the provider I attest to compliance with applicable laws and regulations related to telemedicine.      DATA  Extended Session (53+ minutes): No  Interactive Complexity: No  Crisis: No        Progress Since Last Session (Related to Symptoms / Goals / Homework):   Symptoms: Sofia is more hopeful as she is having some answers for her health and is getting out and about more than in the past.      Homework: Did not complete  Contact couples counselor at [phone]  Set priorities for the next day with your  after dinner but before 9, otherwise he will select his own priorities  Get your bed finalized for  the week  Use safety plan as needed.    In the future:  Look at a budget     Episode of Care Goals: Satisfactory progress - ACTION (Actively working towards change); Intervened by reinforcing change plan / affirming steps taken     Current / Ongoing Stressors and Concerns:   Patient is currently socially isolated. She has a conflictual relationship with her .  She is getting minimal physical activity.  She has had several surgeries.      Treatment Objective(s) Addressed in This Session:   Patient will increase frequency of engaging her in ADLs.  Patient will track and record at least 5 pleasant exchanges with . Patient will be able to identify at least 5 positive traits about her .  Patient will reduce level of depressive and anxious features as evidenced by reduction in score on her CHAVO-7 and PHQ-9 (scores of 15 and 16 at first measurment, respectively).     Intervention:   Supportive Therapy:  Processed patient's medical concerns and the help she's getting now. Looked at her level of hope and what she's done with this. She's prioritized her symptoms: pain is her biggest problem, followed by mood. Looked at her mood and increase in stability, discussing how to continue this.    The following assessments were completed by patient for this visit:  PHQ9:       5/31/2023    10:02 AM 6/6/2023    10:41 AM 6/19/2023     3:05 PM 6/22/2023    10:02 AM 6/26/2023     1:15 PM 6/29/2023    10:02 AM 7/5/2023     9:57 AM   PHQ-9 SCORE   PHQ-9 Total Score MyChart 17 (Moderately severe depression) 14 (Moderate depression) 10 (Moderate depression) 17 (Moderately severe depression) 21 (Severe depression) 19 (Moderately severe depression) 18 (Moderately severe depression)   PHQ-9 Total Score 17 14 10 17 21 19    19 18    18     GAD7:       4/7/2023    12:26 PM 4/25/2023    12:02 PM 5/5/2023     8:07 AM 5/22/2023    10:12 AM 6/6/2023    10:44 AM 6/22/2023     9:58 AM 7/6/2023     9:11 AM   CHAVO-7 SCORE   Total Score  10 (moderate anxiety) 10 (moderate anxiety) 11 (moderate anxiety) 10 (moderate anxiety) 12 (moderate anxiety) 17 (severe anxiety) 15 (severe anxiety)   Total Score 10 10    10 11 10 12 17    17 15    15     PROMIS 10-Global Health (only subscores and total score):       4/7/2023    12:28 PM 4/19/2023     8:26 AM 4/25/2023    12:04 PM 5/5/2023     8:12 AM 6/8/2023    12:05 PM 6/22/2023    10:03 AM 7/6/2023     9:12 AM   PROMIS-10 Scores Only   Global Mental Health Score 7    7    7    7 8    8    8 7    7 6 6    6 5    5 5    5   Global Physical Health Score 8    8    8    8 7    7    7 10    10 9 9    9 7    7 8    8   PROMIS TOTAL - SUBSCORES 15    15    15    15 15    15    15 17    17 15 15    15 12    12 13    13        ASSESSMENT: Current Emotional / Mental Status (status of significant symptoms):   Risk status (Self / Other harm or suicidal ideation)   Patient denies current fears or concerns for personal safety.   Patient reports the following current or recent suicidal ideation or behaviors: patient has persistent suicidal thoughts, but less than before.   Patient denies current or recent homicidal ideation or behaviors.   Patient denies current or recent self injurious behavior or ideation.   Patient denies other safety concerns.   Patient reports there has been no change in risk factors since their last session.     Patient reports there has been no change in protective factors since their last session.     A safety and risk management plan has been developed including: Patient consented to co-developed safety plan on 11/24/2020, updated 2/20/23.  Safety and risk management plan was reviewed.   Patient agreed to use safety plan should any safety concerns arise.  A copy was made available to the patient.     Appearance:   Appropriate    Eye Contact:   Good    Psychomotor Behavior: Normal    Attitude:   Cooperative    Orientation:   All   Speech    Rate / Production: Normal/ Responsive    Volume:  Normal     Mood:    Anxious    Affect:    Appropriate    Thought Content:  Clear    Thought Form:  Coherent    Insight:    Good      Medication Review:   No changes to current psychiatric medication(s)     Medication Compliance:   Yes     Changes in Health Issues:   None reported     Chemical Use Review:   Substance Use: Chemical use reviewed, no active concerns identified Nothing used since 2021.     Tobacco Use: No current tobacco use.      Diagnosis:  1. Bipolar 2 disorder (H)    2. Generalized anxiety disorder    3. Trauma and stressor-related disorder      Collateral Reports Completed:   Not Applicable    PLAN: (Patient Tasks / Therapist Tasks / Other)  Contact couples counselor at [phone]  Set priorities for the next day with your  after dinner but before 9, otherwise he will select his own priorities  Get your bed finalized for the week  Use safety plan as needed.    In the future:  Look at a budget      There has been demonstrated improvement in functioning while patient has been engaged in psychotherapy/psychological service- if withdrawn the patient would deteriorate and/or relapse.     MICAELA SLADE, [facility]   2023                                                        ______________________________________________________________________    Individual Treatment Plan    Patient's Name: Randi Cleary  YOB: 1955    Date of Creation: 20  Date Treatment Plan Last Reviewed/Revised: 23    DSM5 Diagnoses: 296.89 Bipolar II Disorder Depressed, 300.02 (F41.1) Generalized Anxiety Disorder or Adjustment Disorders  309.89 (F43.8) Other Specified Trauma and Stressor Related Disorder  Psychosocial / Contextual Factors: Patient's entire family of origin has , she now has a sister-in-law and  as support.  Relationship with  is conflictual. She is recovering from surgeries  PROMIS (reviewed every 90 days): PROMIS-10 Scores  PROMIS 10-Global Health (only  "subscores and total score):   PROMIS-10 Scores Only 12/14/2021 3/15/2022 3/15/2022 3/15/2022 3/24/2022 4/1/2022 6/9/2022   Global Mental Health Score 6 6 6 6 8 12 12   Global Physical Health Score 9 9 9 9 8 11 10   PROMIS TOTAL - SUBSCORES 15 15 15 15 16 23 22       Referral / Collaboration:  Referral to another professional/service is not indicated at this time..    Anticipated number of session for this episode of care: 50  Anticipation frequency of session: Biweekly  Anticipated Duration of each session: 53 or more minutes due to intensity of trauma symptoms  Treatment plan will be reviewed in 90 days or when goals have been changed.   There has been demonstrated improvement in functioning while patient has been engaged in psychotherapy/psychological service- if withdrawn the patient would deteriorate and/or relapse.       MeasurableTreatment Goal(s) related to diagnosis / functional impairment(s)  Goal 1: Patient will \"jumpstart, getting going with the things I need to be doing around the house as far as picking up, doing things, trying to do something every day.  Also to lessen the animosity between me and my .\"    I will know I've met my goal when my shoulders are fixed and I can see.      Objective #A (Patient Action)    Patient will increase frequency of engaging her in ADLs.  Status: Continued - Date(s): 12/10/21, 3/9/22, 6/9/22, 9/2/22, 12/1/22, 3/2/23, 6/6/23    Intervention(s)  Therapist will engage patient in CBT, specifically behavioral activation.    Objective #B  Patient will  track and record at least 5 pleasant exchanges with . Patient will be able to identify at least 5 positive traits about her  and how he relates to her.  Status: Continued - Date(s): 12/10/21, 3/9/22, 6/9/22, 9/2/22, 12/1/22, 3/2/23, 6/6/23    Intervention(s)  Therapist will teach assertiveness skills and assign homework related to relationship interactions.    Objective #C  Patient will reduce level of " "depressive and anxious features as evidenced by reduction in score on her CHAVO-7 and PHQ-9 (scores of 15 and 16 at first measurment, respectively).  Status: Continued - Date(s): 12/10/21, 3/9/22, 6/9/22, 9/2/22, 12/1/22, 3/2/23, 6/6/23    Intervention(s)  Therapist will engage patient in person-centered therapy and CBT.    Patient has reviewed and agreed to the above plan.      MICAELA SLADE, [facility]  June 6, 2023                                                   Randi Cleary          SAFETY PLAN:  Step 1: Warning signs / cues (Thoughts, images, mood, situation, behavior) that a crisis may be developing:  ? Thoughts: \"I don't want to continue\" \"I am unwanted\"  ? Images: none  ? Thinking Processes: ruminating  ? Mood: anger  ? Behaviors: isolating/withdrawing , can't stop crying, not taking care of myself and not taking care of my responsibilities  ? Situations: small triggers, such as not being able to find something, or dropping something   Step 2: Coping strategies - Things I can do to take my mind off of my problems without contacting another person (relaxation technique, physical activity):  ? Distress Tolerance Strategies:  arts and crafts: drawing, play with my pet , listen to positive and upbeat music: any, change body temperature (ice pack/cold water)  and paced breathing/progressive muscle relaxation  ? Physical Activities: go for a walk, deep breathing and stretching   ? Focus on helpful thoughts:  \"You've been through this before, you can get through it again.\"  Step 3: People and social settings that provide distraction:                 Name: Carmen                            Name: Darien                           Name: Aleida       ? pool, shopping, Carmen's house, [employer]       Step 4: Remind myself of people and things that are important to me and worth living for:  Clifford Little Donna, post-COVID world, options of what could be in your future        Step 5: When I am in crisis, I can ask these " people to help me use my safety plan:                 Name: Sidney  Step 6: Making the environment safe:   ? go to sleep/daydream  Step 7: Professionals or agencies I can contact during a crisis:  ? [facility] Number: [phone]  ? Suicide Prevention Lifeline: 2-061-469-TALK (8255)  ? Crisis Text Line Service (available 24 hours a day, 7 days a week): Text [place] to 554106    Local Crisis Services: [facility] Crisis: [phone]  Adults can always access to the emPATH unit at [facility] (no phone number, utilize it like an urgent care or ER where you just show up)     Call 911 or go to my nearest emergency department.       I helped develop this safety plan and agree to use it when needed.  I have been given a copy of this plan.       Client signature _________________________________________________________________  Today s date:  11/24/2020  Adapted from Safety Plan Template 2008 Randi Poole and Robby Barba is reprinted with the express permission of the authors.  No portion of the Safety Plan Template may be reproduced without the express, written permission.  You can contact the authors at [email or web address] or [email or web address].

## 2023-07-19 ENCOUNTER — TRANSFERRED RECORDS (OUTPATIENT)
Dept: HEALTH INFORMATION MANAGEMENT | Facility: CLINIC | Age: 68
End: 2023-07-19

## 2023-07-21 ENCOUNTER — VIRTUAL VISIT (OUTPATIENT)
Dept: PSYCHOLOGY | Facility: CLINIC | Age: 68
End: 2023-07-21
Payer: MEDICARE

## 2023-07-21 DIAGNOSIS — F41.1 GENERALIZED ANXIETY DISORDER: ICD-10-CM

## 2023-07-21 DIAGNOSIS — F31.81 BIPOLAR 2 DISORDER (H): Primary | ICD-10-CM

## 2023-07-21 DIAGNOSIS — F43.9 TRAUMA AND STRESSOR-RELATED DISORDER: ICD-10-CM

## 2023-07-21 PROCEDURE — 90837 PSYTX W PT 60 MINUTES: CPT | Mod: VID | Performed by: SOCIAL WORKER

## 2023-07-21 ASSESSMENT — ANXIETY QUESTIONNAIRES
1. FEELING NERVOUS, ANXIOUS, OR ON EDGE: NEARLY EVERY DAY
GAD7 TOTAL SCORE: 18
7. FEELING AFRAID AS IF SOMETHING AWFUL MIGHT HAPPEN: MORE THAN HALF THE DAYS
IF YOU CHECKED OFF ANY PROBLEMS ON THIS QUESTIONNAIRE, HOW DIFFICULT HAVE THESE PROBLEMS MADE IT FOR YOU TO DO YOUR WORK, TAKE CARE OF THINGS AT HOME, OR GET ALONG WITH OTHER PEOPLE: EXTREMELY DIFFICULT
3. WORRYING TOO MUCH ABOUT DIFFERENT THINGS: NEARLY EVERY DAY
6. BECOMING EASILY ANNOYED OR IRRITABLE: NEARLY EVERY DAY
2. NOT BEING ABLE TO STOP OR CONTROL WORRYING: MORE THAN HALF THE DAYS
4. TROUBLE RELAXING: NEARLY EVERY DAY
5. BEING SO RESTLESS THAT IT IS HARD TO SIT STILL: MORE THAN HALF THE DAYS
GAD7 TOTAL SCORE: 18

## 2023-07-21 ASSESSMENT — PATIENT HEALTH QUESTIONNAIRE - PHQ9
SUM OF ALL RESPONSES TO PHQ QUESTIONS 1-9: 18
SUM OF ALL RESPONSES TO PHQ QUESTIONS 1-9: 18
10. IF YOU CHECKED OFF ANY PROBLEMS, HOW DIFFICULT HAVE THESE PROBLEMS MADE IT FOR YOU TO DO YOUR WORK, TAKE CARE OF THINGS AT HOME, OR GET ALONG WITH OTHER PEOPLE: EXTREMELY DIFFICULT

## 2023-07-21 NOTE — PROGRESS NOTES
[facility]                                     Progress Note    Patient Name: Randi Cleary  Date: 7/21/23         Service Type: Individual     Session Start Time: 11:02 AM Session End Time: 11:57 AM     Session Length: 54 minutes    Session #: 189    Attendees: Client attended alone    Service Modality:  Video Visit:      Provider verified identity through the following two step process.  Patient provided:  Patient is known previously to provider    Telemedicine Visit: The patient's condition can be safely assessed and treated via synchronous audio and visual telemedicine encounter.      Reason for Telemedicine Visit: Patient convenience (e.g. access to timely appointments / distance to available provider)    Originating Site (Patient Location): Patient's home    Distant Site (Provider Location): Provider Remote Setting- Home Office    Consent:  The patient/guardian has verbally consented to: the potential risks and benefits of telemedicine (video visit) versus in person care; bill my insurance or make self-payment for services provided; and responsibility for payment of non-covered services.     Patient would like the video invitation sent by:  My Chart    Mode of Communication:  [facility]    Distant Location (Provider):  On-site    As the provider I attest to compliance with applicable laws and regulations related to telemedicine.      DATA  Extended Session (53+ minutes): No  Interactive Complexity: No  Crisis: No        Progress Since Last Session (Related to Symptoms / Goals / Homework):   Symptoms: Patient is feeling overwhelmed and frustrated again by her health needs still not being fully met.      Homework: Did not complete  Contact couples counselor at [phone]  Set priorities for the next day with your  after dinner but before 9, otherwise he will select his own priorities  Get your bed finalized for the week  Use safety plan as needed.    In the  future:  Look at a budget       Episode of Care Goals: Satisfactory progress - ACTION (Actively working towards change); Intervened by reinforcing change plan / affirming steps taken     Current / Ongoing Stressors and Concerns:   Patient is currently socially isolated. She has a conflictual relationship with her .  She is getting minimal physical activity.  She has had several surgeries.      Treatment Objective(s) Addressed in This Session:   Patient will increase frequency of engaging her in ADLs.  Patient will track and record at least 5 pleasant exchanges with . Patient will be able to identify at least 5 positive traits about her .  Patient will reduce level of depressive and anxious features as evidenced by reduction in score on her CHAVO-7 and PHQ-9 (scores of 15 and 16 at first measurment, respectively).     Intervention:   Supportive Therapy:  Processed health concerns and how they were impacting her. Discussed the challenges in managing having so many different specialists for so many different concerns. Talked about how to hold onto hope when she has so many moments of feeling hopeless. Helped patient sort out what her next steps will be.     The following assessments were completed by patient for this visit:  PHQ9:       6/6/2023    10:41 AM 6/19/2023     3:05 PM 6/22/2023    10:02 AM 6/26/2023     1:15 PM 6/29/2023    10:02 AM 7/5/2023     9:57 AM 7/21/2023    10:32 AM   PHQ-9 SCORE   PHQ-9 Total Score MyChart 14 (Moderate depression) 10 (Moderate depression) 17 (Moderately severe depression) 21 (Severe depression) 19 (Moderately severe depression) 18 (Moderately severe depression) 18 (Moderately severe depression)   PHQ-9 Total Score 14 10 17 21 19    19 18    18 18     GAD7:       4/25/2023    12:02 PM 5/5/2023     8:07 AM 5/22/2023    10:12 AM 6/6/2023    10:44 AM 6/22/2023     9:58 AM 7/6/2023     9:11 AM 7/21/2023    10:34 AM   CHAVO-7 SCORE   Total Score 10 (moderate anxiety) 11  (moderate anxiety) 10 (moderate anxiety) 12 (moderate anxiety) 17 (severe anxiety) 15 (severe anxiety) 18 (severe anxiety)   Total Score 10    10 11 10 12 17    17 15    15 18     PROMIS 10-Global Health (only subscores and total score):       4/19/2023     8:26 AM 4/25/2023    12:04 PM 5/5/2023     8:12 AM 6/8/2023    12:05 PM 6/22/2023    10:03 AM 7/6/2023     9:12 AM 7/21/2023    10:36 AM   PROMIS-10 Scores Only   Global Mental Health Score 8    8    8 7    7 6 6    6 5    5 5    5 5    5   Global Physical Health Score 7    7    7 10    10 9 9    9 7    7 8    8 7    7   PROMIS TOTAL - SUBSCORES 15    15    15 17    17 15 15    15 12    12 13    13 12    12        ASSESSMENT: Current Emotional / Mental Status (status of significant symptoms):   Risk status (Self / Other harm or suicidal ideation)   Patient denies current fears or concerns for personal safety.   Patient reports the following current or recent suicidal ideation or behaviors: patient has persistent suicidal thoughts, but less than before.   Patient denies current or recent homicidal ideation or behaviors.   Patient denies current or recent self injurious behavior or ideation.   Patient denies other safety concerns.   Patient reports there has been no change in risk factors since their last session.     Patient reports there has been no change in protective factors since their last session.     A safety and risk management plan has been developed including: Patient consented to co-developed safety plan on 11/24/2020, updated 2/20/23.  Safety and risk management plan was reviewed.   Patient agreed to use safety plan should any safety concerns arise.  A copy was made available to the patient.     Appearance:   Appropriate    Eye Contact:   Good    Psychomotor Behavior: Normal    Attitude:   Cooperative    Orientation:   All   Speech    Rate / Production: Normal/ Responsive    Volume:  Normal    Mood:    Anxious    Affect:    Appropriate    Thought  Content:  Clear    Thought Form:  Coherent    Insight:    Good      Medication Review:   No changes to current psychiatric medication(s)     Medication Compliance:   Yes     Changes in Health Issues:   None reported     Chemical Use Review:   Substance Use: Chemical use reviewed, no active concerns identified Nothing used since 2021.     Tobacco Use: No current tobacco use.      Diagnosis:  1. Bipolar 2 disorder (H)    2. Generalized anxiety disorder    3. Trauma and stressor-related disorder      Collateral Reports Completed:   Not Applicable    PLAN: (Patient Tasks / Therapist Tasks / Other)  Contact couples counselor at [phone]  Set priorities for the next day with your  after dinner but before 9, otherwise he will select his own priorities  Get your bed finalized for the week  Use safety plan as needed.    In the future:  Look at a budget      There has been demonstrated improvement in functioning while patient has been engaged in psychotherapy/psychological service- if withdrawn the patient would deteriorate and/or relapse.     MICAELA SLADE, [facility]   2023                                                        ______________________________________________________________________    Individual Treatment Plan    Patient's Name: Randi Cleary  YOB: 1955    Date of Creation: 20  Date Treatment Plan Last Reviewed/Revised: 23    DSM5 Diagnoses: 296.89 Bipolar II Disorder Depressed, 300.02 (F41.1) Generalized Anxiety Disorder or Adjustment Disorders  309.89 (F43.8) Other Specified Trauma and Stressor Related Disorder  Psychosocial / Contextual Factors: Patient's entire family of origin has , she now has a sister-in-law and  as support.  Relationship with  is conflictual. She is recovering from surgeries  PROMIS (reviewed every 90 days): PROMIS-10 Scores  PROMIS 10-Global Health (only subscores and total score):   PROMIS-10 Scores Only  "12/14/2021 3/15/2022 3/15/2022 3/15/2022 3/24/2022 4/1/2022 6/9/2022   Global Mental Health Score 6 6 6 6 8 12 12   Global Physical Health Score 9 9 9 9 8 11 10   PROMIS TOTAL - SUBSCORES 15 15 15 15 16 23 22       Referral / Collaboration:  Referral to another professional/service is not indicated at this time..    Anticipated number of session for this episode of care: 50  Anticipation frequency of session: Biweekly  Anticipated Duration of each session: 53 or more minutes due to intensity of trauma symptoms  Treatment plan will be reviewed in 90 days or when goals have been changed.   There has been demonstrated improvement in functioning while patient has been engaged in psychotherapy/psychological service- if withdrawn the patient would deteriorate and/or relapse.       MeasurableTreatment Goal(s) related to diagnosis / functional impairment(s)  Goal 1: Patient will \"jumpstart, getting going with the things I need to be doing around the house as far as picking up, doing things, trying to do something every day.  Also to lessen the animosity between me and my .\"    I will know I've met my goal when my shoulders are fixed and I can see.      Objective #A (Patient Action)    Patient will increase frequency of engaging her in ADLs.  Status: Continued - Date(s): 12/10/21, 3/9/22, 6/9/22, 9/2/22, 12/1/22, 3/2/23, 6/6/23    Intervention(s)  Therapist will engage patient in CBT, specifically behavioral activation.    Objective #B  Patient will  track and record at least 5 pleasant exchanges with . Patient will be able to identify at least 5 positive traits about her  and how he relates to her.  Status: Continued - Date(s): 12/10/21, 3/9/22, 6/9/22, 9/2/22, 12/1/22, 3/2/23, 6/6/23    Intervention(s)  Therapist will teach assertiveness skills and assign homework related to relationship interactions.    Objective #C  Patient will reduce level of depressive and anxious features as evidenced by " "reduction in score on her CHAVO-7 and PHQ-9 (scores of 15 and 16 at first measurment, respectively).  Status: Continued - Date(s): 12/10/21, 3/9/22, 6/9/22, 9/2/22, 12/1/22, 3/2/23, 6/6/23    Intervention(s)  Therapist will engage patient in person-centered therapy and CBT.    Patient has reviewed and agreed to the above plan.      MICAELA SLADE, [facility] [place]  June 6, 2023                                                   Randi Cleary          SAFETY PLAN:  Step 1: Warning signs / cues (Thoughts, images, mood, situation, behavior) that a crisis may be developing:  ? Thoughts: \"I don't want to continue\" \"I am unwanted\"  ? Images: none  ? Thinking Processes: ruminating  ? Mood: anger  ? Behaviors: isolating/withdrawing , can't stop crying, not taking care of myself and not taking care of my responsibilities  ? Situations: small triggers, such as not being able to find something, or dropping something   Step 2: Coping strategies - Things I can do to take my mind off of my problems without contacting another person (relaxation technique, physical activity):  ? Distress Tolerance Strategies:  arts and crafts: drawing, play with my pet , listen to positive and upbeat music: any, change body temperature (ice pack/cold water)  and paced breathing/progressive muscle relaxation  ? Physical Activities: go for a walk, deep breathing and stretching   ? Focus on helpful thoughts:  \"You've been through this before, you can get through it again.\"  Step 3: People and social settings that provide distraction:                 Name: Carmen                            Name: Darien                           Name: Aleida       ? pool, shopping, Carmen's house, [employer]       Step 4: Remind myself of people and things that are important to me and worth living for:  Clifford Little Donna, post-COVID world, options of what could be in your future        Step 5: When I am in crisis, I can ask these people to help me use my safety " plan:                 Name: Sidney  Step 6: Making the environment safe:   ? go to sleep/daydream  Step 7: Professionals or agencies I can contact during a crisis:  ? [facility] Daytime Number: [phone]  ? Suicide Prevention Lifeline: 8-680-800-TALK (8200)  ? Crisis Text Line Service (available 24 hours a day, 7 days a week): Text [place] to 167202    Local Crisis Services: [facility] Crisis: [phone]  Adults can always access to the emPATH unit at [facility] (no phone number, utilize it like an urgent care or ER where you just show up)     Call 911 or go to my nearest emergency department.       I helped develop this safety plan and agree to use it when needed.  I have been given a copy of this plan.       Client signature _________________________________________________________________  Today s date:  11/24/2020  Adapted from Safety Plan Template 2008 Randi Poole and Robby Barba is reprinted with the express permission of the authors.  No portion of the Safety Plan Template may be reproduced without the express, written permission.  You can contact the authors at [email or web address] or [email or web address].

## 2023-07-24 ENCOUNTER — TELEPHONE (OUTPATIENT)
Dept: ENDOCRINOLOGY | Facility: CLINIC | Age: 68
End: 2023-07-24
Payer: MEDICARE

## 2023-07-24 NOTE — TELEPHONE ENCOUNTER
[facility]    Phone Message    May a detailed message be left on voicemail: yes     Reason for Call: Other: patient is having issues with her diabetes and neuropathy patient states it is up to her knees and she cannot walk barefoot please call patient to discuss, patient saw orthodics today at [facility] patient also states she had a TSH of 3.18 recently and it has doubled since January    Action Taken: Other: endo    Travel Screening: Not Applicable

## 2023-07-25 ENCOUNTER — APPOINTMENT (OUTPATIENT)
Dept: LAB | Facility: CLINIC | Age: 68
End: 2023-07-25
Attending: INTERNAL MEDICINE
Payer: MEDICARE

## 2023-07-25 ENCOUNTER — ONCOLOGY VISIT (OUTPATIENT)
Dept: ONCOLOGY | Facility: CLINIC | Age: 68
End: 2023-07-25
Attending: INTERNAL MEDICINE
Payer: MEDICARE

## 2023-07-25 VITALS
HEART RATE: 93 BPM | SYSTOLIC BLOOD PRESSURE: 122 MMHG | OXYGEN SATURATION: 95 % | DIASTOLIC BLOOD PRESSURE: 78 MMHG | TEMPERATURE: 98 F | WEIGHT: 230 LBS | BODY MASS INDEX: 37.12 KG/M2 | RESPIRATION RATE: 18 BRPM

## 2023-07-25 DIAGNOSIS — E55.9 VITAMIN D DEFICIENCY: ICD-10-CM

## 2023-07-25 DIAGNOSIS — E53.8 VITAMIN B12 DEFICIENCY: ICD-10-CM

## 2023-07-25 DIAGNOSIS — E83.119 HEMOCHROMATOSIS, UNSPECIFIED HEMOCHROMATOSIS TYPE: Primary | ICD-10-CM

## 2023-07-25 LAB
BASOPHILS # BLD AUTO: 0 10E3/UL (ref 0–0.2)
BASOPHILS NFR BLD AUTO: 0 %
EOSINOPHIL # BLD AUTO: 0.1 10E3/UL (ref 0–0.7)
EOSINOPHIL NFR BLD AUTO: 1 %
ERYTHROCYTE [DISTWIDTH] IN BLOOD BY AUTOMATED COUNT: 12.3 % (ref 10–15)
HCT VFR BLD AUTO: 47.4 % (ref 35–47)
HGB BLD-MCNC: 16.6 G/DL (ref 11.7–15.7)
IMM GRANULOCYTES # BLD: 0.1 10E3/UL
IMM GRANULOCYTES NFR BLD: 1 %
IRON BINDING CAPACITY (ROCHE): 300 UG/DL (ref 240–430)
IRON SATN MFR SERPL: 76 % (ref 15–46)
IRON SERPL-MCNC: 229 UG/DL (ref 37–145)
LYMPHOCYTES # BLD AUTO: 1.3 10E3/UL (ref 0.8–5.3)
LYMPHOCYTES NFR BLD AUTO: 18 %
MCH RBC QN AUTO: 32.9 PG (ref 26.5–33)
MCHC RBC AUTO-ENTMCNC: 35 G/DL (ref 31.5–36.5)
MCV RBC AUTO: 94 FL (ref 78–100)
MONOCYTES # BLD AUTO: 0.4 10E3/UL (ref 0–1.3)
MONOCYTES NFR BLD AUTO: 6 %
NEUTROPHILS # BLD AUTO: 5.7 10E3/UL (ref 1.6–8.3)
NEUTROPHILS NFR BLD AUTO: 74 %
NRBC # BLD AUTO: 0 10E3/UL
NRBC BLD AUTO-RTO: 0 /100
PLATELET # BLD AUTO: 229 10E3/UL (ref 150–450)
RBC # BLD AUTO: 5.04 10E6/UL (ref 3.8–5.2)
VIT B12 SERPL-MCNC: 987 PG/ML (ref 232–1245)
WBC # BLD AUTO: 7.6 10E3/UL (ref 4–11)

## 2023-07-25 PROCEDURE — 99214 OFFICE O/P EST MOD 30 MIN: CPT | Performed by: INTERNAL MEDICINE

## 2023-07-25 PROCEDURE — 36415 COLL VENOUS BLD VENIPUNCTURE: CPT | Performed by: INTERNAL MEDICINE

## 2023-07-25 PROCEDURE — 85025 COMPLETE CBC W/AUTO DIFF WBC: CPT | Performed by: INTERNAL MEDICINE

## 2023-07-25 PROCEDURE — 82306 VITAMIN D 25 HYDROXY: CPT | Performed by: INTERNAL MEDICINE

## 2023-07-25 PROCEDURE — G0463 HOSPITAL OUTPT CLINIC VISIT: HCPCS | Performed by: INTERNAL MEDICINE

## 2023-07-25 PROCEDURE — 82607 VITAMIN B-12: CPT | Performed by: INTERNAL MEDICINE

## 2023-07-25 PROCEDURE — 83921 ORGANIC ACID SINGLE QUANT: CPT | Performed by: INTERNAL MEDICINE

## 2023-07-25 PROCEDURE — 83550 IRON BINDING TEST: CPT | Performed by: INTERNAL MEDICINE

## 2023-07-25 ASSESSMENT — PAIN SCALES - GENERAL: PAINLEVEL: SEVERE PAIN (6)

## 2023-07-25 NOTE — LETTER
2023         RE: Randi Cleary  [address] [facility] [address]        Dear Colleague,    Thank you for referring your patient, Randi Cleary, to the [facility]. Please see a copy of my visit note below.        [facility] Hematology Progress Note    Outpatient Visit Note:    Patient: Randi Damon  MRN: [id number]  : 1955  YUAN: 2023    Reason for Consultation:  Two copies of the C282Y mutation - hereditary hemochromatosis    Assessment:  In summary, aRndi Damon is a 67 year old woman with hereditary hemochromatosis and Past Medical History of KYAW, major depressive disorder, serotonin syndrome, pheochromocytoma,     1. Hereditary hemochromatosis  2. Restless leg syndrome  3. Major depressive disorder  4. Obstructive sleep apnea  5. Polycythemia, likely secondary to to #4    For hereditary hemochromatosis we typically try to remove excess iron by doing serial phlebotomy to reduce iron (ferritin) to goal of 50. However, in Ms. Damon's case, she has severe restless leg and fatigue therefore would permit for target ferritin of 125. Would NOT allow for ferritin to exceed 250 in this patient. Threshold for phlebotomy will be 150.     Ms. Damon has polycythemia. This is NOT related to HH, as iron status is regulated separate from this. This is likely secondary to her untreated YKAW, however if she were to develop leukocytosis or thrombocytosis would consider sending evaluation for MPN.      Discussed with Ms. Damon the pathophysiology of iron overload disorders today. Given ferritin levels remain <1000 and prior testing has not demonstrated end-organ damage do not suspect her joint issues are related.     Plan:  1. Majority of today's visit was spent counseling the patient regarding iron status, etc.  2. Labs every 2 months, .  3. Phlebotomy for ferritin >150  4. I will have my RNCC reach out and get release of  "information for records from [place] and [facility] for joint issues.     Patient should schedule infusion with phlebotomy in Oct-Nov. Follow up with Dr. Pablo in Jan 2024.    Tova Pablo MD/PhD   of Medicine  Division of Hematology, Oncology and Transplantation    ----------------------------------------------------------------------------------------------------------------------    Interval History:  Randi Damon is a 67 year old woman who is homozygous for Hereditary hemochromatosis C282Y mutation with complex medical history who presents for follow-up.    The air quality alerts have made breathing challenging- with humidity.   Sleep is poor. Needs a new bed. Has shoulder issues and neck pain. Has dizziness.- notices when seating. The room does not spin. When she gets up and walks feels like she a \"drunken \". Tearful in clinic as ever since Jan she was feeling poor.     Noting loss of muscle tone and increased fatigue. Moods are more labile. Was taken off psychiatric records due to serotonin syndrome.     Struggling with weight loss. Hard to get to the pool. Noting pain in her hands and feet.      Past Medical History:  Past Medical History:   Diagnosis Date     Anxiety      Bipolar disorder (H)      Breast cancer (H) 1986    lumpectomy, radiation, chemo     Chronic pain syndrome      COPD (chronic obstructive pulmonary disease) (H)     asthma     Cord compression (H) 12/21/2021     Depression, major, recurrent (H)      Dizzy      Drug tolerance     opioid     Esophageal reflux      Fatigue      Graves disease 1994     Hemochromatosis 02/14/2018    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 Created by Conversion  Replacement Utility updated for latest IMO load Formatting of this note might be different from the original. Created by Conversion  Replacement Utility " updated for latest IMO load     History of corticosteroid therapy 11/19/2019     History of partial adrenalectomy (H) 11/19/2019     History of pheochromocytoma 11/19/2019     Hx antineoplastic chemotherapy      Hx of radiation therapy      Hyperlipidaemia      Hypertension      Impaired fasting glucose 2017     Injury of neck, whiplash 07/15/2021     Joint pain      Morbid obesity (H)      KYAW (obstructive sleep apnea) 2016     Osteopenia      Pheochromocytoma, left 08/02/2017    laparoscopically removed     Postablative hypothyroidism 1995     Prediabetes 10/03/2019    by A1c     Psoriasis      Psoriatic arthropathy (H)      Right rotator cuff tear      RLS (restless legs syndrome)     on ropinorole     Sacroiliitis (H)      Serotonin syndrome 08/28/2020    [facility]     Snoring      Spinal stenosis      Status post coronary angiogram 10/03/2019     Urinary incontinence      Vitamin B 12 deficiency 2009     Vitamin D deficiency 2010     Past Surgical History:  Past Surgical History:   Procedure Laterality Date     ARTHRODESIS ANKLE       ARTHROPLASTY ANKLE Right 6/29/2015    Procedure: ARTHROPLASTY ANKLE;  Surgeon: Jason Coughlin MD;  Location: [facility]     ARTHROPLASTY REVISION ANKLE Right 6/29/2015    Procedure: ARTHROPLASTY REVISION ANKLE;  Surgeon: Jason Coughlin MD;  Location:  [facility]     BIOPSY BREAST       BREAST BIOPSY, CORE RT/LT       COLONOSCOPY       COLONOSCOPY N/A 2/25/2021    Procedure: COLONOSCOPY;  Surgeon: Guru Elke Tolbert MD;  Location:  GI     CV CORONARY ANGIOGRAM N/A 10/3/2019    Procedure: CV CORONARY ANGIOGRAM;  Surgeon: Bryce Pierre MD;  Location:  HEART CARDIAC CATH LAB     CV RIGHT HEART CATH MEASUREMENTS RECORDED N/A 10/3/2019    Procedure: CV RIGHT HEART CATH;  Surgeon: Bryce Pierre MD;  Location:  HEART CARDIAC CATH LAB     ESOPHAGOSCOPY, GASTROSCOPY, DUODENOSCOPY (EGD), COMBINED N/A 2/25/2021    Procedure:  ESOPHAGOGASTRODUODENOSCOPY, WITH BIOPSY;  Surgeon: Guru Elke Tolbert MD;  Location:  GI     EYE SURGERY  2021     HC REMOVE TONSILS/ADENOIDS,<13 Y/O      Description: Tonsillectomy With Adenoidectomy;  Recorded: 04/07/2010;     IR LUMBAR EPIDURAL STEROID INJECTION  10/26/2004     IR LUMBAR EPIDURAL STEROID INJECTION  11/16/2004     IR LUMBAR EPIDURAL STEROID INJECTION  12/21/2004     IR LUMBAR EPIDURAL STEROID INJECTION  6/8/2006     JOINT REPLACEMENT       LAMINOPLASTY CERVICAL POSTERIOR THREE+ LEVELS Left 12/21/2021    Procedure: CERVICAL 3-CERVICAL 6 LEFT OPEN DOOR LAMINOPLASTY AND LEFT CERVICAL 4-5 AND CERVICAL 6-7 POSTERIOR FORAMINOTOMY;  Surgeon: Angela Gregory MD;  Location: [facility]     LAPAROSCOPIC ADRENALECTOMY Left 08/02/2017    pheochromocytoma     LAPAROSCOPIC ADRENALECTOMY Left 8/2/2017    Procedure: LAPAROSCOPIC LEFT ADRENALECTOMY, ;  Surgeon: Gab Linares MD;  Location: [facility];  Service:      LENGTHEN TENDON ACHILLES Right 6/29/2015    Procedure: LENGTHEN TENDON ACHILLES;  Surgeon: Jason Coughlin MD;  Location: [facility]     LUMPECTOMY BREAST       LUMPECTOMY BREAST Left 1994     MAMMOPLASTY REDUCTION Right 01/13/2015    [place]     MAMMOPLASTY REDUCTION Right     approx late 2015/early2016     MASTECTOMY      left lumpectomy with axillary node dissection     MASTECTOMY MODIFIED RADICAL       OTHER SURGICAL HISTORY Right     reconstructive breast surgery     OTHER SURGICAL HISTORY      Adrenalectomy for pheochromocytoma     UT MASTECTOMY, MODIFIED RADICAL      Description: Modified Radical Mastectomy Left Breast;  Recorded: 04/07/2010;     REPAIR HAMMER TOE Right 6/29/2015    Procedure: REPAIR HAMMER TOE;  Surgeon: Jason Coughlin MD;  Location: [facility]     TONSILLECTOMY       TONSILLECTOMY & ADENOIDECTOMY       ZZC ARTHRODESIS,ANKLE,OPEN Right     Description: Ankle Arthrodesis;  Recorded: 04/07/2010;       Medications:  Current Outpatient  Medications   Medication Sig Dispense Refill     albuterol (PROVENTIL) (2.5 MG/3ML) 0.083% neb solution Take 1 vial (2.5 mg) by nebulization every 4 hours as needed for shortness of breath / dyspnea or wheezing 360 mL 5     albuterol (VENTOLIN HFA) 108 (90 Base) MCG/ACT inhaler Inhale 2 puffs into the lungs every 6 hours 18 g 11     benzonatate (TESSALON) 200 MG capsule Take 1 capsule (200 mg) by mouth 3 times daily as needed for cough 30 capsule 1     Cholecalciferol (VITAMIN D3) 250 MCG (27639 UT) TABS Take 1 tablet by mouth daily 16668/day       Cyanocobalamin (VITAMIN B-12) 5000 MCG SUBL Place 2-3 sprays under the tongue daily Unknown dose. 2 or 3 sprays/day       ethacrynic acid (EDECRIN) 25 MG tablet Take 1 tablet (25 mg) by mouth every other day 45 tablet 3     Fluticasone-Umeclidin-Vilanterol (TRELEGY ELLIPTA) 200-62.5-25 MCG/INH oral inhaler Inhale 1 puff into the lungs daily 4 each 3     HYDROcodone-acetaminophen (NORCO) 5-325 MG tablet Take 1 tablet by mouth every 6 hours as needed for breakthrough pain or moderate to severe pain 112 tablet 0     KLOR-CON 20 MEQ CR tablet Take 1 tablet (20 mEq total) by mouth 2 (two) times a day. 180 tablet 0     magnesium 250 MG tablet Take 1 tablet by mouth 2 times daily       medical cannabis (Patient's own supply) See Admin Instructions (The purpose of this order is to document that the patient reports taking medical cannabis.  This is not a prescription, and is not used to certify that the patient has a qualifying medical condition.)  Flower       omeprazole (PRILOSEC) 20 MG DR capsule Take 1 capsule (20 mg) by mouth daily 90 capsule 3     rOPINIRole (REQUIP) 2 MG tablet One tab 3 pm, one bedtime, and one during night on waking 90 tablet 3     SYNTHROID 150 MCG tablet Take 1 tablet (150 mcg) by mouth daily 90 tablet 4     vitamin E 400 units TABS Take 800 Units by mouth daily       hydrOXYzine (ATARAX) 25 MG tablet Take 1 tablet (25 mg) by mouth every 8 hours as  "needed for anxiety (Patient not taking: Reported on 7/25/2023) 60 tablet 3        Allergies:  Allergies   Allergen Reactions     Serotonin Reuptake Inhibitors (Ssris) Anxiety, Difficulty breathing, Headache, Palpitations and Shortness Of Breath     Buspirone      The patient states she had serotonin syndrome     Cephalexin      Other reaction(s): unknown rxn.     Desvenlafaxine      Serotonin syndrome     Diclofenac Sodium [Diclofenac]      Serotonin syndrome and restless legs syndrome     Gabapentin      Drove on the wrong side of the highway     Levofloxacin      \"CAN'T REMEMBER\"     Penicillins      \"SORES IN MOUTH\"     Riluzole Difficulty breathing and Swelling     Sulfa Antibiotics      \"PT DOES NOT KNOW WHAT THE REACTION WAS\"       ROS:  A 10 point ROS is negative except as stated in the HPI    Social History:  Denies any tobacco use. Denies any illicit drug use.     Family History:  Reviewed- no interval updates    Vitals: B/P: 122/78, T: 98, P: 93, R: 18, Wt: 230 lbs 0 oz  Exam:   Constitutional: Appears well, no distress  HEENT: Pupils equal and reactive to light. No scleral icterus or hemorrhage. Nares without evidence of telangiectasia. Mucous membranes moist with no wet purpura.   CV: regular rate and rhythm, no murmurs  Respiratory: clear, no wheezing  GI: abdomen soft, nontender, without guarding or rebound.   Mus/Skele: no edema. Mild ulnar deviation in MIP joints. No erythema or swelling. Bilateral braces in place.  Skin: no petechiae, no ecchymosis.  Neuro: CN II-XII intact. AOx3    Labs: personally reviewed with relevant trends annotated below  Ferritin   Date Value Ref Range Status   07/05/2023 134 11 - 328 ng/mL Final   05/01/2023 64 11 - 328 ng/mL Final   01/18/2023 75 11 - 328 ng/mL Final   11/04/2022 85 11 - 328 ng/mL Final   07/01/2022 60 8 - 252 ng/mL Final   06/07/2022 125 10 - 130 ng/mL Final   02/05/2021 48 8 - 252 ng/mL Final   10/30/2020 20 8 - 252 ng/mL Final   02/12/2020 19 8 - 252 " ng/mL Final   2019 33 8 - 252 ng/mL Final   2019 36 8 - 252 ng/mL Final   2019 42 8 - 252 ng/mL Final     Iron   Date Value Ref Range Status   2023 85 37 - 145 ug/dL Final   2023 185 (H) 37 - 145 ug/dL Final   2022 201 (H) 37 - 145 ug/dL Final   2022 127 35 - 180 ug/dL Final   2022 216 (H) 35 - 180 ug/dL Final   2022 145 35 - 180 ug/dL Final   2021 270 (H) 35 - 180 ug/dL Final   10/01/2021 182 (H) 35 - 180 ug/dL Final   2021 141 35 - 180 ug/dL Final   10/30/2020 52 35 - 180 ug/dL Final   2019 92 35 - 180 ug/dL Final   2019 237 (H) 35 - 180 ug/dL Final     WBC   Date Value Ref Range Status   2021 6.9 4.0 - 11.0 10e9/L Final     WBC Count   Date Value Ref Range Status   2023 7.6 4.0 - 11.0 10e3/uL Final     Hemoglobin   Date Value Ref Range Status   2023 16.6 (H) 11.7 - 15.7 g/dL Final   2023 18.1 (H) 11.7 - 15.7 g/dL Final   2023 17.2 (H) 11.7 - 15.7 g/dL Final   2022 17.3 (H) 11.7 - 15.7 g/dL Final   2022 16.7 (H) 11.7 - 15.7 g/dL Final   2022 15.8 (H) 11.7 - 15.7 g/dL Final   2021 17.7 (H) 11.7 - 15.7 g/dL Final   10/30/2020 15.4 11.7 - 15.7 g/dL Final   2020 14.3 11.7 - 15.7 g/dL Final   2020 14.0 11.7 - 15.7 g/dL Final   10/03/2019 14.6 11.7 - 15.7 g/dL Final   2019 15.7 11.7 - 15.7 g/dL Final       Imagin23  EXAM: MR CERVICAL SPINE W/O CONTRAST  2023 6:56 AM      HISTORY:  cervical stenosis/RUE radic, hx laminoplasty; Cervical  radiculopathy        COMPARISON:  2022 CT     TECHNIQUE: Sagittal T1-weighted, sagittal STIR, sagittal T2-weighted,  axial and sagittal gradient echo, and axial T2-weighted images of the  cervical spine were obtained without intravenous contrast.     FINDINGS:     Straightening of the lordotic curvature with reversal at the C4-5  level. Grade 1 retrolisthesis of C4 on C5 with grade 1 anterolisthesis  of C7 on T1 and T1 on  T2. Mild to moderate loss of disc height  throughout greatest at C4-5 and C5-6. Opposing degenerative endplate  changes at these levels most apparent at C4-5. Underlying marrow  signal pattern is normal. Prevertebral soft tissues are normal.  Surgical changes of left-sided open-door laminoplasty from C3 to C6.  No abnormal cord signal.     The findings on a level by level basis are as follows:     C2-3: Left greater than right facet arthropathy with mild left-sided  uncinate hypertrophy. Mild bilateral left greater than right neural  foraminal stenosis. Thickening of the ligamentum flavum. No  significant canal stenosis.     C3-4: Posterior disc osteophyte complex with bilateral right greater  than left uncinate hypertrophy and bilateral facet arthropathy.  Moderate to severe right and mild-to-moderate left neural foraminal  stenosis. No significant spinal canal stenosis.     C4-5: Posterior disc osteophyte complex with bilateral facet  arthropathy and uncinate hypertrophy. Mild to moderate right and  moderate left foraminal stenosis. Mild flattening of the ventral cord  although no overt spinal canal stenosis.     C5-6: Posterior disc osteophyte complex with bilateral facet  arthropathy and uncinate hypertrophy. Mild to moderate bilateral  foraminal stenosis. No canal stenosis.     C6-7: Posterior disc osteophyte complex with bilateral facet  arthropathy and uncinate hypertrophy. Moderate bilateral neural  foraminal stenosis. Mild spinal canal stenosis.     C7-T1: Bilateral facet arthropathy with mild right foraminal stenosis.  No canal stenosis.     No acute abnormality in the visualized intracranial components.  Arachnoid granulation left transverse sinus..                                                                      IMPRESSION:     1. Surgical changes of left-sided open-door laminoplasty from C3 to  C6. No residual high-grade spinal canal stenosis. Mild spinal canal  stenosis is seen at the C6-7  level.  2. Multilevel neural foraminal stenosis detailed above. There is  moderate to severe right-sided neural foraminal stenosis at the C3-4.         Again, thank you for allowing me to participate in the care of your patient.        Sincerely,        Tova Pablo MD

## 2023-07-25 NOTE — NURSING NOTE
"Oncology Rooming Note    July 25, 2023 5:05 PM   Randi Cleary is a 67 year old female who presents for:    Chief Complaint   Patient presents with    Blood Draw     Labs drawn via  by RN in lab. VS Taken.    Oncology Clinic Visit     Hemochromatosis     Initial Vitals: /78 (BP Location: Right arm, Patient Position: Sitting, Cuff Size: Adult Large)   Pulse 93   Temp 98  F (36.7  C) (Oral)   Resp 18   Wt 104.3 kg (230 lb)   SpO2 95%   BMI 37.12 kg/m   Estimated body mass index is 37.12 kg/m  as calculated from the following:    Height as of 7/14/23: 1.676 m (5' 6\").    Weight as of this encounter: 104.3 kg (230 lb). Body surface area is 2.2 meters squared.  Severe Pain (6) Comment: Data Unavailable   No LMP recorded. Patient is postmenopausal.  Allergies reviewed: Yes  Medications reviewed: Yes    Medications: Medication refills not needed today.  Pharmacy name entered into [facility]:    [id number] [place], [place] - [address] [facility] [place] [address]    Clinical concerns:        Hanna Escalante CMA            "

## 2023-07-25 NOTE — NURSING NOTE
Chief Complaint   Patient presents with    Blood Draw     Labs drawn via  by RN in lab. VS Taken.     Labs collected from venipuncture by RN. Vitals taken. Checked in for appointment(s).    Maryan Read RN

## 2023-07-25 NOTE — PATIENT INSTRUCTIONS
We will plan on rechecking your ferritin in Oct.   Your goal ferritin level is <150 - I anticipate you will need phlebotomy at that time- we should schedule your labs with phlebtomy appointment to save you appointments.       Sowmya will contact you regarding ortho/rheumatology records.     Follow up with Dr. Pablo in January 2024.    Tova Pablo MD/PhD   of Medicine  Division of Hematology, Oncology and Transplantation    [facility] [address], Mail [address]  [place] [address]    Clinic Scheduling and Nurse Line: [phone], option 5, option 2      RN Care Coordinator:   Sowmya Hernandez  [facility] [place]  Direct line: (P) [phone]  (F) [phone]

## 2023-07-25 NOTE — PROGRESS NOTES
[facility] Hematology Progress Note    Outpatient Visit Note:    Patient: Randi Damon  MRN: [id number]  : 1955  YUAN: 2023    Reason for Consultation:  Two copies of the C282Y mutation - hereditary hemochromatosis    Assessment:  In summary, Randi Damon is a 67 year old woman with hereditary hemochromatosis and Past Medical History of KYAW, major depressive disorder, serotonin syndrome, pheochromocytoma,     1. Hereditary hemochromatosis  2. Restless leg syndrome  3. Major depressive disorder  4. Obstructive sleep apnea  5. Polycythemia, likely secondary to to #4    For hereditary hemochromatosis we typically try to remove excess iron by doing serial phlebotomy to reduce iron (ferritin) to goal of 50. However, in Ms. Damon's case, she has severe restless leg and fatigue therefore would permit for target ferritin of 125. Would NOT allow for ferritin to exceed 250 in this patient. Threshold for phlebotomy will be 150.     Ms. Damon has polycythemia. This is NOT related to HH, as iron status is regulated separate from this. This is likely secondary to her untreated KYAW, however if she were to develop leukocytosis or thrombocytosis would consider sending evaluation for MPN.      Discussed with Ms. Damon the pathophysiology of iron overload disorders today. Given ferritin levels remain <1000 and prior testing has not demonstrated end-organ damage do not suspect her joint issues are related.     Plan:  1. Majority of today's visit was spent counseling the patient regarding iron status, etc.  2. Labs every 2 months, .  3. Phlebotomy for ferritin >150  4. I will have my RNCC reach out and get release of information for records from [place] [facility] for joint issues.     Patient should schedule infusion with phlebotomy in Oct-Nov. Follow up with Dr. Pablo in 2024.    Tova Pablo MD/PhD   of Medicine  Division of Hematology, Oncology and  "Transplantation    ----------------------------------------------------------------------------------------------------------------------    Interval History:  Randi Damon is a 67 year old woman who is homozygous for Hereditary hemochromatosis C282Y mutation with complex medical history who presents for follow-up.    The air quality alerts have made breathing challenging- with humidity.   Sleep is poor. Needs a new bed. Has shoulder issues and neck pain. Has dizziness.- notices when seating. The room does not spin. When she gets up and walks feels like she a \"drunken \". Tearful in clinic as ever since Jan she was feeling poor.     Noting loss of muscle tone and increased fatigue. Moods are more labile. Was taken off psychiatric records due to serotonin syndrome.     Struggling with weight loss. Hard to get to the pool. Noting pain in her hands and feet.      Past Medical History:  Past Medical History:   Diagnosis Date    Anxiety     Bipolar disorder (H)     Breast cancer (H) 1986    lumpectomy, radiation, chemo    Chronic pain syndrome     COPD (chronic obstructive pulmonary disease) (H)     asthma    Cord compression (H) 12/21/2021    Depression, major, recurrent (H)     Dizzy     Drug tolerance     opioid    Esophageal reflux     Fatigue     Graves disease 1994    Hemochromatosis 02/14/2018    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; H63D not detected    Hemochromatosis     History of breast cancer 08/28/2020    Formatting of this note might be different from the original. Created by Conversion  Replacement Utility updated for latest IMO load Formatting of this note might be different from the original. Created by Conversion  Replacement Utility updated for latest IMO load    History of corticosteroid therapy 11/19/2019    History of partial adrenalectomy (H) 11/19/2019    History of pheochromocytoma 11/19/2019    Hx antineoplastic chemotherapy     Hx of radiation therapy     Hyperlipidaemia     Hypertension     Impaired " fasting glucose 2017    Injury of neck, whiplash 07/15/2021    Joint pain     Morbid obesity (H)     KYAW (obstructive sleep apnea) 2016    Osteopenia     Pheochromocytoma, left 08/02/2017    laparoscopically removed    Postablative hypothyroidism 1995    Prediabetes 10/03/2019    by A1c    Psoriasis     Psoriatic arthropathy (H)     Right rotator cuff tear     RLS (restless legs syndrome)     on ropinorole    Sacroiliitis (H)     Serotonin syndrome 08/28/2020    [facility]    Snoring     Spinal stenosis     Status post coronary angiogram 10/03/2019    Urinary incontinence     Vitamin B 12 deficiency 2009    Vitamin D deficiency 2010     Past Surgical History:  Past Surgical History:   Procedure Laterality Date    ARTHRODESIS ANKLE      ARTHROPLASTY ANKLE Right 6/29/2015    Procedure: ARTHROPLASTY ANKLE;  Surgeon: Jason Coughiln MD;  Location: [facility]    ARTHROPLASTY REVISION ANKLE Right 6/29/2015    Procedure: ARTHROPLASTY REVISION ANKLE;  Surgeon: Jason Coughlin MD;  Location: [facility]    BIOPSY BREAST      BREAST BIOPSY, CORE RT/LT      COLONOSCOPY      COLONOSCOPY N/A 2/25/2021    Procedure: COLONOSCOPY;  Surgeon: Guru Elke Tolbert MD;  Location:  GI    CV CORONARY ANGIOGRAM N/A 10/3/2019    Procedure: CV CORONARY ANGIOGRAM;  Surgeon: Bryce Pierre MD;  Location:  HEART CARDIAC CATH LAB    CV RIGHT HEART CATH MEASUREMENTS RECORDED N/A 10/3/2019    Procedure: CV RIGHT HEART CATH;  Surgeon: Bryce Pierre MD;  Location:  HEART CARDIAC CATH LAB    ESOPHAGOSCOPY, GASTROSCOPY, DUODENOSCOPY (EGD), COMBINED N/A 2/25/2021    Procedure: ESOPHAGOGASTRODUODENOSCOPY, WITH BIOPSY;  Surgeon: Guru Elke Tolbert MD;  Location:  GI    EYE SURGERY  2021    HC REMOVE TONSILS/ADENOIDS,<13 Y/O      Description: Tonsillectomy With Adenoidectomy;  Recorded: 04/07/2010;    IR LUMBAR EPIDURAL STEROID INJECTION  10/26/2004    IR LUMBAR EPIDURAL STEROID INJECTION   11/16/2004    IR LUMBAR EPIDURAL STEROID INJECTION  12/21/2004    IR LUMBAR EPIDURAL STEROID INJECTION  6/8/2006    JOINT REPLACEMENT      LAMINOPLASTY CERVICAL POSTERIOR THREE+ LEVELS Left 12/21/2021    Procedure: CERVICAL 3-CERVICAL 6 LEFT OPEN DOOR LAMINOPLASTY AND LEFT CERVICAL 4-5 AND CERVICAL 6-7 POSTERIOR FORAMINOTOMY;  Surgeon: Angela Gregory MD;  Location: [facility]    LAPAROSCOPIC ADRENALECTOMY Left 08/02/2017    pheochromocytoma    LAPAROSCOPIC ADRENALECTOMY Left 8/2/2017    Procedure: LAPAROSCOPIC LEFT ADRENALECTOMY, ;  Surgeon: Gab Linares MD;  [place] [facility]  Service:     LENGTHEN TENDON ACHILLES Right 6/29/2015    Procedure: LENGTHEN TENDON ACHILLES;  Surgeon: Jason Coughlin MD;  Location: [facility]    LUMPECTOMY BREAST      LUMPECTOMY BREAST Left 1994    MAMMOPLASTY REDUCTION Right 01/13/2015    [place]    MAMMOPLASTY REDUCTION Right     approx late 2015/early2016    MASTECTOMY      left lumpectomy with axillary node dissection    MASTECTOMY MODIFIED RADICAL      OTHER SURGICAL HISTORY Right     reconstructive breast surgery    OTHER SURGICAL HISTORY      Adrenalectomy for pheochromocytoma    NH MASTECTOMY, MODIFIED RADICAL      Description: Modified Radical Mastectomy Left Breast;  Recorded: 04/07/2010;    REPAIR HAMMER TOE Right 6/29/2015    Procedure: REPAIR HAMMER TOE;  Surgeon: Jason Coughlin MD;  Location: [facility]    TONSILLECTOMY      TONSILLECTOMY & ADENOIDECTOMY      ZZC ARTHRODESIS,ANKLE,OPEN Right     Description: Ankle Arthrodesis;  Recorded: 04/07/2010;       Medications:  Current Outpatient Medications   Medication Sig Dispense Refill    albuterol (PROVENTIL) (2.5 MG/3ML) 0.083% neb solution Take 1 vial (2.5 mg) by nebulization every 4 hours as needed for shortness of breath / dyspnea or wheezing 360 mL 5    albuterol (VENTOLIN HFA) 108 (90 Base) MCG/ACT inhaler Inhale 2 puffs into the lungs every 6 hours 18 g 11    benzonatate (TESSALON) 200 MG  capsule Take 1 capsule (200 mg) by mouth 3 times daily as needed for cough 30 capsule 1    Cholecalciferol (VITAMIN D3) 250 MCG (71851 UT) TABS Take 1 tablet by mouth daily 19279/day      Cyanocobalamin (VITAMIN B-12) 5000 MCG SUBL Place 2-3 sprays under the tongue daily Unknown dose. 2 or 3 sprays/day      ethacrynic acid (EDECRIN) 25 MG tablet Take 1 tablet (25 mg) by mouth every other day 45 tablet 3    Fluticasone-Umeclidin-Vilanterol (TRELEGY ELLIPTA) 200-62.5-25 MCG/INH oral inhaler Inhale 1 puff into the lungs daily 4 each 3    HYDROcodone-acetaminophen (NORCO) 5-325 MG tablet Take 1 tablet by mouth every 6 hours as needed for breakthrough pain or moderate to severe pain 112 tablet 0    KLOR-CON 20 MEQ CR tablet Take 1 tablet (20 mEq total) by mouth 2 (two) times a day. 180 tablet 0    magnesium 250 MG tablet Take 1 tablet by mouth 2 times daily      medical cannabis (Patient's own supply) See Admin Instructions (The purpose of this order is to document that the patient reports taking medical cannabis.  This is not a prescription, and is not used to certify that the patient has a qualifying medical condition.)  Flower      omeprazole (PRILOSEC) 20 MG DR capsule Take 1 capsule (20 mg) by mouth daily 90 capsule 3    rOPINIRole (REQUIP) 2 MG tablet One tab 3 pm, one bedtime, and one during night on waking 90 tablet 3    SYNTHROID 150 MCG tablet Take 1 tablet (150 mcg) by mouth daily 90 tablet 4    vitamin E 400 units TABS Take 800 Units by mouth daily      hydrOXYzine (ATARAX) 25 MG tablet Take 1 tablet (25 mg) by mouth every 8 hours as needed for anxiety (Patient not taking: Reported on 7/25/2023) 60 tablet 3        Allergies:  Allergies   Allergen Reactions    Serotonin Reuptake Inhibitors (Ssris) Anxiety, Difficulty breathing, Headache, Palpitations and Shortness Of Breath    Buspirone      The patient states she had serotonin syndrome    Cephalexin      Other reaction(s): unknown rxn.    Desvenlafaxine       "Serotonin syndrome    Diclofenac Sodium [Diclofenac]      Serotonin syndrome and restless legs syndrome    Gabapentin      Drove on the wrong side of the highway    Levofloxacin      \"CAN'T REMEMBER\"    Penicillins      \"SORES IN MOUTH\"    Riluzole Difficulty breathing and Swelling    Sulfa Antibiotics      \"PT DOES NOT KNOW WHAT THE REACTION WAS\"       ROS:  A 10 point ROS is negative except as stated in the HPI    Social History:  Denies any tobacco use. Denies any illicit drug use.     Family History:  Reviewed- no interval updates    Vitals: B/P: 122/78, T: 98, P: 93, R: 18, Wt: 230 lbs 0 oz  Exam:   Constitutional: Appears well, no distress  HEENT: Pupils equal and reactive to light. No scleral icterus or hemorrhage. Nares without evidence of telangiectasia. Mucous membranes moist with no wet purpura.   CV: regular rate and rhythm, no murmurs  Respiratory: clear, no wheezing  GI: abdomen soft, nontender, without guarding or rebound.   Mus/Skele: no edema. Mild ulnar deviation in MIP joints. No erythema or swelling. Bilateral braces in place.  Skin: no petechiae, no ecchymosis.  Neuro: CN II-XII intact. AOx3    Labs: personally reviewed with relevant trends annotated below  Ferritin   Date Value Ref Range Status   07/05/2023 134 11 - 328 ng/mL Final   05/01/2023 64 11 - 328 ng/mL Final   01/18/2023 75 11 - 328 ng/mL Final   11/04/2022 85 11 - 328 ng/mL Final   07/01/2022 60 8 - 252 ng/mL Final   06/07/2022 125 10 - 130 ng/mL Final   02/05/2021 48 8 - 252 ng/mL Final   10/30/2020 20 8 - 252 ng/mL Final   02/12/2020 19 8 - 252 ng/mL Final   09/24/2019 33 8 - 252 ng/mL Final   09/19/2019 36 8 - 252 ng/mL Final   09/13/2019 42 8 - 252 ng/mL Final     Iron   Date Value Ref Range Status   05/01/2023 85 37 - 145 ug/dL Final   01/18/2023 185 (H) 37 - 145 ug/dL Final   11/04/2022 201 (H) 37 - 145 ug/dL Final   07/01/2022 127 35 - 180 ug/dL Final   06/07/2022 216 (H) 35 - 180 ug/dL Final   02/23/2022 145 35 - 180 ug/dL " Final   2021 270 (H) 35 - 180 ug/dL Final   10/01/2021 182 (H) 35 - 180 ug/dL Final   2021 141 35 - 180 ug/dL Final   10/30/2020 52 35 - 180 ug/dL Final   2019 92 35 - 180 ug/dL Final   2019 237 (H) 35 - 180 ug/dL Final     WBC   Date Value Ref Range Status   2021 6.9 4.0 - 11.0 10e9/L Final     WBC Count   Date Value Ref Range Status   2023 7.6 4.0 - 11.0 10e3/uL Final     Hemoglobin   Date Value Ref Range Status   2023 16.6 (H) 11.7 - 15.7 g/dL Final   2023 18.1 (H) 11.7 - 15.7 g/dL Final   2023 17.2 (H) 11.7 - 15.7 g/dL Final   2022 17.3 (H) 11.7 - 15.7 g/dL Final   2022 16.7 (H) 11.7 - 15.7 g/dL Final   2022 15.8 (H) 11.7 - 15.7 g/dL Final   2021 17.7 (H) 11.7 - 15.7 g/dL Final   10/30/2020 15.4 11.7 - 15.7 g/dL Final   2020 14.3 11.7 - 15.7 g/dL Final   2020 14.0 11.7 - 15.7 g/dL Final   10/03/2019 14.6 11.7 - 15.7 g/dL Final   2019 15.7 11.7 - 15.7 g/dL Final       Imagin23  EXAM: MR CERVICAL SPINE W/O CONTRAST  2023 6:56 AM      HISTORY:  cervical stenosis/RUE radic, hx laminoplasty; Cervical  radiculopathy        COMPARISON:  2022 CT     TECHNIQUE: Sagittal T1-weighted, sagittal STIR, sagittal T2-weighted,  axial and sagittal gradient echo, and axial T2-weighted images of the  cervical spine were obtained without intravenous contrast.     FINDINGS:     Straightening of the lordotic curvature with reversal at the C4-5  level. Grade 1 retrolisthesis of C4 on C5 with grade 1 anterolisthesis  of C7 on T1 and T1 on T2. Mild to moderate loss of disc height  throughout greatest at C4-5 and C5-6. Opposing degenerative endplate  changes at these levels most apparent at C4-5. Underlying marrow  signal pattern is normal. Prevertebral soft tissues are normal.  Surgical changes of left-sided open-door laminoplasty from C3 to C6.  No abnormal cord signal.     The findings on a level by level basis are as  follows:     C2-3: Left greater than right facet arthropathy with mild left-sided  uncinate hypertrophy. Mild bilateral left greater than right neural  foraminal stenosis. Thickening of the ligamentum flavum. No  significant canal stenosis.     C3-4: Posterior disc osteophyte complex with bilateral right greater  than left uncinate hypertrophy and bilateral facet arthropathy.  Moderate to severe right and mild-to-moderate left neural foraminal  stenosis. No significant spinal canal stenosis.     C4-5: Posterior disc osteophyte complex with bilateral facet  arthropathy and uncinate hypertrophy. Mild to moderate right and  moderate left foraminal stenosis. Mild flattening of the ventral cord  although no overt spinal canal stenosis.     C5-6: Posterior disc osteophyte complex with bilateral facet  arthropathy and uncinate hypertrophy. Mild to moderate bilateral  foraminal stenosis. No canal stenosis.     C6-7: Posterior disc osteophyte complex with bilateral facet  arthropathy and uncinate hypertrophy. Moderate bilateral neural  foraminal stenosis. Mild spinal canal stenosis.     C7-T1: Bilateral facet arthropathy with mild right foraminal stenosis.  No canal stenosis.     No acute abnormality in the visualized intracranial components.  Arachnoid granulation left transverse sinus..                                                                      IMPRESSION:     1. Surgical changes of left-sided open-door laminoplasty from C3 to  C6. No residual high-grade spinal canal stenosis. Mild spinal canal  stenosis is seen at the C6-7 level.  2. Multilevel neural foraminal stenosis detailed above. There is  moderate to severe right-sided neural foraminal stenosis at the C3-4.

## 2023-07-25 NOTE — TELEPHONE ENCOUNTER
Seen today by [facility] . Last visit 2021 next visit in 2024. Tiana Felix RN on 7/24/2023 at 7:09 PM

## 2023-07-25 NOTE — Clinical Note
2023         RE: Randi Cleary  [address] [facility] [address]        Dear Colleague,    Thank you for referring your patient, Randi Cleary, to the [facility]. Please see a copy of my visit note below.    Winnie is a 67 year old who is being evaluated via a billable video visit.      How would you like to obtain your AVS? MyChart  If the video visit is dropped, the invitation should be resent by: Send to e-mail at: [email or web address]  Will anyone else be joining your video visit? No    Patient confirms medications and allergies are accurate via patients echeck in completion, and or denies any changes since last reviewed/verified.     Cari Lara, Virtual Facilitator/LPn      Video-Visit Details    Type of service:  Video Visit   Video Start Time: 1:53 PM  Video End Time:2:07 PM    Originating Location (pt. Location): Home  {PROVIDER LOCATION On-site should be selected for visits conducted from your clinic location or adjoining [facility] hospital, academic office, or other nearby [facility] building. Off-site should be selected for all other provider locations, including home:[id number]}  Distant Location (provider location):  On-site  Platform used for Video Visit: [facility] Hematology Progress Note    Outpatient Visit Note:    Patient: Randi Damon  MRN: [id number]  : 1955  YUAN: 2023    Reason for Consultation:  Two copies of the C282Y mutation - hereditary hemochromatosis    Assessment:  In summary, Randi Damon is a 67 year old woman with hereditary hemochromatosis and Past Medical History of KYAW, major depressive disorder, serotonin syndrome, pheochromocytoma,     1. Hereditary hemochromatosis  2. Restless leg syndrome  3. Major depressive disorder  4. Obstructive sleep apnea  5. Polycythemia, likely secondary to to #4    For hereditary hemochromatosis we typically try to remove excess iron by  "doing serial phlebotomy to reduce iron (ferritin) to goal of 50. However, in Ms. Damon's case, she has severe restless leg and fatigue therefore would permit for target ferritin of 125. Would NOT allow for ferritin to exceed 250 in this patient. Reports she has not had phlebotomy since seeing Dr. Pablo in July.  Ferritin is currently 75.    Ms. Damon has polycythemia. This is NOT related to HH, as iron status is regulated separate from this. This is likely secondary to her untreated KYAW, however if she were to develop leukocytosis or thrombocytosis would consider sending evaluation for MPN.  Hemoglobin and hematocrit continue to climb - will review this with Dr. Pablo.          Plan:  1. Majority of today's visit was spent counseling the patient regarding iron status, etc.  2. Labs every 2 months, RTC to see Dr. Pablo in 6 months.  3. Phlebotomy for ferritin >250      29 minutes spent on the date of the encounter doing chart review, review of test results, patient visit and documentation     DION [facility] [address] [address]  [phone]    ----------------------------------------------------------------------------------------------------------------------    Interval History:  Randi Damon is a 67 year old woman who is homozygous for Hereditary hemochromatosis C282Y mutation with complex medical history who presents for follow-up.    Reports her energy remains low.  She continues to have a lot of pain from her neck, she is anticipating another surgery for this. Thinks her pain may be part of what affects her energy.  Feels like she has noticed a \"copper\" tone to her skin recently, although states her  doesn't see it. Denies abdominal pain, nausea, vomiting, or constipation.  Has occasional diarrhea, likely secondary to her magnesium supplement.      Past Medical History:  Past Medical History:   Diagnosis Date    Anxiety     " Bipolar disorder (H)     Breast cancer (H) 1986    lumpectomy, radiation, chemo    Chronic pain syndrome     COPD (chronic obstructive pulmonary disease) (H)     asthma    Cord compression (H) 12/21/2021    Depression, major, recurrent (H)     Dizzy     Drug tolerance     opioid    Esophageal reflux     Fatigue     Graves disease 1994    Hemochromatosis 02/14/2018    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; H63D not detected    Hemochromatosis     History of breast cancer 08/28/2020    Formatting of this note might be different from the original. Created by Conversion  Replacement Utility updated for latest IMO load Formatting of this note might be different from the original. Created by Conversion  Replacement Utility updated for latest IMO load    History of corticosteroid therapy 11/19/2019    History of partial adrenalectomy (H) 11/19/2019    History of pheochromocytoma 11/19/2019    Hx antineoplastic chemotherapy     Hx of radiation therapy     Hyperlipidaemia     Hypertension     Impaired fasting glucose 2017    Injury of neck, whiplash 07/15/2021    Joint pain     Morbid obesity (H)     KYAW (obstructive sleep apnea) 2016    Osteopenia     Pheochromocytoma, left 08/02/2017    laparoscopically removed    Postablative hypothyroidism 1995    Prediabetes 10/03/2019    by A1c    Psoriasis     Psoriatic arthropathy (H)     Right rotator cuff tear     RLS (restless legs syndrome)     on ropinorole    Sacroiliitis (H)     Serotonin syndrome 08/28/2020    [facility]    Snoring     Spinal stenosis     Status post coronary angiogram 10/03/2019    Urinary incontinence     Vitamin B 12 deficiency 2009    Vitamin D deficiency 2010     Past Surgical History:  Past Surgical History:   Procedure Laterality Date    ARTHRODESIS ANKLE      ARTHROPLASTY ANKLE Right 6/29/2015    Procedure: ARTHROPLASTY ANKLE;  Surgeon: Jason Coughlin MD;  Location: [facility]    ARTHROPLASTY REVISION ANKLE Right 6/29/2015    Procedure: ARTHROPLASTY REVISION  ANKLE;  Surgeon: Jason Coughlin MD;  Location: [facility]    BIOPSY BREAST      BREAST BIOPSY, CORE RT/LT      COLONOSCOPY      COLONOSCOPY N/A 2/25/2021    Procedure: COLONOSCOPY;  Surgeon: Guru Elke Tolbert MD;  Location:  GI    CV CORONARY ANGIOGRAM N/A 10/3/2019    Procedure: CV CORONARY ANGIOGRAM;  Surgeon: Bryce Pierre MD;  Location:  HEART CARDIAC CATH LAB    CV RIGHT HEART CATH MEASUREMENTS RECORDED N/A 10/3/2019    Procedure: CV RIGHT HEART CATH;  Surgeon: Bryce Pierre MD;  Location:  HEART CARDIAC CATH LAB    ESOPHAGOSCOPY, GASTROSCOPY, DUODENOSCOPY (EGD), COMBINED N/A 2/25/2021    Procedure: ESOPHAGOGASTRODUODENOSCOPY, WITH BIOPSY;  Surgeon: Guru Elke Tolbert MD;  Location:  GI    EYE SURGERY  2021    HC REMOVE TONSILS/ADENOIDS,<11 Y/O      Description: Tonsillectomy With Adenoidectomy;  Recorded: 04/07/2010;    IR LUMBAR EPIDURAL STEROID INJECTION  10/26/2004    IR LUMBAR EPIDURAL STEROID INJECTION  11/16/2004    IR LUMBAR EPIDURAL STEROID INJECTION  12/21/2004    IR LUMBAR EPIDURAL STEROID INJECTION  6/8/2006    JOINT REPLACEMENT      LAMINOPLASTY CERVICAL POSTERIOR THREE+ LEVELS Left 12/21/2021    Procedure: CERVICAL 3-CERVICAL 6 LEFT OPEN DOOR LAMINOPLASTY AND LEFT CERVICAL 4-5 AND CERVICAL 6-7 POSTERIOR FORAMINOTOMY;  Surgeon: Angela Gregory MD;  Location: [facility] OR    LAPAROSCOPIC ADRENALECTOMY Left 08/02/2017    pheochromocytoma    LAPAROSCOPIC ADRENALECTOMY Left 8/2/2017    Procedure: LAPAROSCOPIC LEFT ADRENALECTOMY, ;  Surgeon: Gab Linares MD;  Location: [facility] OR;  Service:     LENGTHEN TENDON ACHILLES Right 6/29/2015    Procedure: LENGTHEN TENDON ACHILLES;  Surgeon: Jason Coughlin MD;  Location: [facility]    LUMPECTOMY BREAST      LUMPECTOMY BREAST Left 1994    MAMMOPLASTY REDUCTION Right 01/13/2015    [place]    MAMMOPLASTY REDUCTION Right     approx late 2015/early2016    MASTECTOMY      left  lumpectomy with axillary node dissection    MASTECTOMY MODIFIED RADICAL      OTHER SURGICAL HISTORY Right     reconstructive breast surgery    OTHER SURGICAL HISTORY      Adrenalectomy for pheochromocytoma    NV MASTECTOMY, MODIFIED RADICAL      Description: Modified Radical Mastectomy Left Breast;  Recorded: 04/07/2010;    REPAIR HAMMER TOE Right 6/29/2015    Procedure: REPAIR HAMMER TOE;  Surgeon: Jason Coughlin MD;  Location: [facility]    TONSILLECTOMY      TONSILLECTOMY & ADENOIDECTOMY      ZZC ARTHRODESIS,ANKLE,OPEN Right     Description: Ankle Arthrodesis;  Recorded: 04/07/2010;       Medications:  Current Outpatient Medications   Medication Sig Dispense Refill    albuterol (PROVENTIL) (2.5 MG/3ML) 0.083% neb solution Take 1 vial (2.5 mg) by nebulization every 4 hours as needed for shortness of breath / dyspnea or wheezing 360 mL 5    albuterol (VENTOLIN HFA) 108 (90 Base) MCG/ACT inhaler Inhale 2 puffs into the lungs every 6 hours 18 g 11    benzonatate (TESSALON) 200 MG capsule Take 1 capsule (200 mg) by mouth 3 times daily as needed for cough 30 capsule 1    Cholecalciferol (VITAMIN D3) 250 MCG (24006 UT) TABS Take 1 tablet by mouth daily 26434/day      Cyanocobalamin (VITAMIN B-12) 5000 MCG SUBL Place 2-3 sprays under the tongue daily Unknown dose. 2 or 3 sprays/day      ethacrynic acid (EDECRIN) 25 MG tablet Take 1 tablet (25 mg) by mouth every other day 45 tablet 3    Fluticasone-Umeclidin-Vilanterol (TRELEGY ELLIPTA) 200-62.5-25 MCG/INH oral inhaler Inhale 1 puff into the lungs daily 4 each 3    HYDROcodone-acetaminophen (NORCO) 5-325 MG tablet Take 1 tablet by mouth every 6 hours as needed for breakthrough pain or moderate to severe pain 112 tablet 0    KLOR-CON 20 MEQ CR tablet Take 1 tablet (20 mEq total) by mouth 2 (two) times a day. 180 tablet 0    magnesium 250 MG tablet Take 1 tablet by mouth 2 times daily      medical cannabis (Patient's own supply) See Admin Instructions (The purpose of this  "order is to document that the patient reports taking medical cannabis.  This is not a prescription, and is not used to certify that the patient has a qualifying medical condition.)  Flower      omeprazole (PRILOSEC) 20 MG DR capsule Take 1 capsule (20 mg) by mouth daily 90 capsule 3    rOPINIRole (REQUIP) 2 MG tablet One tab 3 pm, one bedtime, and one during night on waking 90 tablet 3    SYNTHROID 150 MCG tablet Take 1 tablet (150 mcg) by mouth daily 90 tablet 4    vitamin E 400 units TABS Take 800 Units by mouth daily      hydrOXYzine (ATARAX) 25 MG tablet Take 1 tablet (25 mg) by mouth every 8 hours as needed for anxiety (Patient not taking: Reported on 7/25/2023) 60 tablet 3        Allergies:  Allergies   Allergen Reactions    Serotonin Reuptake Inhibitors (Ssris) Anxiety, Difficulty breathing, Headache, Palpitations and Shortness Of Breath    Buspirone      The patient states she had serotonin syndrome    Cephalexin      Other reaction(s): unknown rxn.    Desvenlafaxine      Serotonin syndrome    Diclofenac Sodium [Diclofenac]      Serotonin syndrome and restless legs syndrome    Gabapentin      Drove on the wrong side of the highway    Levofloxacin      \"CAN'T REMEMBER\"    Penicillins      \"SORES IN MOUTH\"    Riluzole Difficulty breathing and Swelling    Sulfa Antibiotics      \"PT DOES NOT KNOW WHAT THE REACTION WAS\"       ROS:  A 10 point ROS is negative except as stated in the HPI    Social History:  Denies any tobacco use. Denies any illicit drug use.     Family History:  Reviewed- no interval updates    Video physical exam  General: Patient appears well in no acute distress.   Skin: No visualized rash or lesions on visualized skin  Eyes: EOMI, no erythema, sclera icterus or discharge noted  Resp: Appears to be breathing comfortably without accessory muscle usage, speaking in full sentences, no cough  MSK: Appears to have normal range of motion based on visualized movements  Neurologic: No apparent tremors, " facial movements symmetric  Psych: affect bright, alert and oriented      Labs: personally reviewed with relevant trends annotated below  Ferritin   Date Value Ref Range Status   07/05/2023 134 11 - 328 ng/mL Final   05/01/2023 64 11 - 328 ng/mL Final   01/18/2023 75 11 - 328 ng/mL Final   11/04/2022 85 11 - 328 ng/mL Final   07/01/2022 60 8 - 252 ng/mL Final   06/07/2022 125 10 - 130 ng/mL Final   02/05/2021 48 8 - 252 ng/mL Final   10/30/2020 20 8 - 252 ng/mL Final   02/12/2020 19 8 - 252 ng/mL Final   09/24/2019 33 8 - 252 ng/mL Final   09/19/2019 36 8 - 252 ng/mL Final   09/13/2019 42 8 - 252 ng/mL Final     Iron   Date Value Ref Range Status   05/01/2023 85 37 - 145 ug/dL Final   01/18/2023 185 (H) 37 - 145 ug/dL Final   11/04/2022 201 (H) 37 - 145 ug/dL Final   07/01/2022 127 35 - 180 ug/dL Final   06/07/2022 216 (H) 35 - 180 ug/dL Final   02/23/2022 145 35 - 180 ug/dL Final   11/16/2021 270 (H) 35 - 180 ug/dL Final   10/01/2021 182 (H) 35 - 180 ug/dL Final   02/05/2021 141 35 - 180 ug/dL Final   10/30/2020 52 35 - 180 ug/dL Final   09/24/2019 92 35 - 180 ug/dL Final   08/13/2019 237 (H) 35 - 180 ug/dL Final     WBC   Date Value Ref Range Status   02/05/2021 6.9 4.0 - 11.0 10e9/L Final     WBC Count   Date Value Ref Range Status   07/25/2023 7.6 4.0 - 11.0 10e3/uL Final     Hemoglobin   Date Value Ref Range Status   07/25/2023 16.6 (H) 11.7 - 15.7 g/dL Final   05/01/2023 18.1 (H) 11.7 - 15.7 g/dL Final   01/18/2023 17.2 (H) 11.7 - 15.7 g/dL Final   11/04/2022 17.3 (H) 11.7 - 15.7 g/dL Final   08/31/2022 16.7 (H) 11.7 - 15.7 g/dL Final   07/01/2022 15.8 (H) 11.7 - 15.7 g/dL Final   02/05/2021 17.7 (H) 11.7 - 15.7 g/dL Final   10/30/2020 15.4 11.7 - 15.7 g/dL Final   03/09/2020 14.3 11.7 - 15.7 g/dL Final   02/12/2020 14.0 11.7 - 15.7 g/dL Final   10/03/2019 14.6 11.7 - 15.7 g/dL Final   09/24/2019 15.7 11.7 - 15.7 g/dL Final       Imaging:  10/30/20  Average liver iron concentration is 0.7 mg/g dry tissue  (normal = 0.17  - 1.8)  Average liver iron concentration is 12 mmol/kg dry tissue (normal = 3  - 33)        [facility] Hematology Progress Note    Outpatient Visit Note:    Patient: Randi Damon  MRN: [id number]  : 1955  YUAN: 2023    Reason for Consultation:  Two copies of the C282Y mutation - hereditary hemochromatosis    Assessment:  In summary, Randi Damon is a 67 year old woman with hereditary hemochromatosis and Past Medical History of KYAW, major depressive disorder, serotonin syndrome, pheochromocytoma,     1. Hereditary hemochromatosis  2. Restless leg syndrome  3. Major depressive disorder  4. Obstructive sleep apnea  5. Polycythemia, likely secondary to to #4    For hereditary hemochromatosis we typically try to remove excess iron by doing serial phlebotomy to reduce iron (ferritin) to goal of 50. However, in Ms. Damon's case, she has severe restless leg and fatigue therefore would permit for target ferritin of 125. Would NOT allow for ferritin to exceed 250 in this patient. Reports she has not had phlebotomy since seeing Dr. Pablo in July.  Ferritin is currently 75.    Ms. Damon has polycythemia. This is NOT related to HH, as iron status is regulated separate from this. This is likely secondary to her untreated KYAW, however if she were to develop leukocytosis or thrombocytosis would consider sending evaluation for MPN.  Hemoglobin and hematocrit continue to climb - will review this with Dr. Pablo.      Plan:  1. Majority of today's visit was spent counseling the patient regarding iron status, etc.  2. Labs every 2 months, RTC to see Dr. Pablo in 6 months.  3. Phlebotomy for ferritin >250      29 minutes spent on the date of the encounter doing chart review, review of test results, patient visit and documentation         ----------------------------------------------------------------------------------------------------------------------    Interval  "History:  Randi Damon is a 67 year old woman who is homozygous for Hereditary hemochromatosis C282Y mutation with complex medical history who presents for follow-up.    Reports her energy remains low.  She continues to have a lot of pain from her neck, she is anticipating another surgery for this. Thinks her pain may be part of what affects her energy.  Feels like she has noticed a \"copper\" tone to her skin recently, although states her  doesn't see it. Denies abdominal pain, nausea, vomiting, or constipation.  Has occasional diarrhea, likely secondary to her magnesium supplement.    The air quality alerts have made breathing challenging- with humidity.   Sleep is poor. Needs a new bed. Has shoulder issues and neck pain. Has dizziness.- notices when seating. The room does not spin. When she gets up and walks feels like she a \"drunken \".     Every since Jan she was feeling poor.   Noting loss of muscle tone and increased fatigue. Moods are more labile. Was taken off psychiatric records due to serotonin syndrome.       Past Medical History:  Past Medical History:   Diagnosis Date     Anxiety      Bipolar disorder (H)      Breast cancer (H) 1986    lumpectomy, radiation, chemo     Chronic pain syndrome      COPD (chronic obstructive pulmonary disease) (H)     asthma     Cord compression (H) 12/21/2021     Depression, major, recurrent (H)      Dizzy      Drug tolerance     opioid     Esophageal reflux      Fatigue      Graves disease 1994     Hemochromatosis 02/14/2018    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 Created by Conversion  Replacement Utility updated for latest IMO load Formatting of this note might be different from the original. Created by Conversion  Replacement Utility updated for latest IMO load     History of corticosteroid therapy 11/19/2019     History of " partial adrenalectomy (H) 11/19/2019     History of pheochromocytoma 11/19/2019     Hx antineoplastic chemotherapy      Hx of radiation therapy      Hyperlipidaemia      Hypertension      Impaired fasting glucose 2017     Injury of neck, whiplash 07/15/2021     Joint pain      Morbid obesity (H)      KYAW (obstructive sleep apnea) 2016     Osteopenia      Pheochromocytoma, left 08/02/2017    laparoscopically removed     Postablative hypothyroidism 1995     Prediabetes 10/03/2019    by A1c     Psoriasis      Psoriatic arthropathy (H)      Right rotator cuff tear      RLS (restless legs syndrome)     on ropinorole     Sacroiliitis (H)      Serotonin syndrome 08/28/2020    [facility]     Snoring      Spinal stenosis      Status post coronary angiogram 10/03/2019     Urinary incontinence      Vitamin B 12 deficiency 2009     Vitamin D deficiency 2010     Past Surgical History:  Past Surgical History:   Procedure Laterality Date     ARTHRODESIS ANKLE       ARTHROPLASTY ANKLE Right 6/29/2015    Procedure: ARTHROPLASTY ANKLE;  Surgeon: Jason Coughlin MD;  Location: [facility]     ARTHROPLASTY REVISION ANKLE Right 6/29/2015    Procedure: ARTHROPLASTY REVISION ANKLE;  Surgeon: Jason Coughlin MD;  Location: [facility]     BIOPSY BREAST       BREAST BIOPSY, CORE RT/LT       COLONOSCOPY       COLONOSCOPY N/A 2/25/2021    Procedure: COLONOSCOPY;  Surgeon: Guru Elke Tolbert MD;  Location:  GI     CV CORONARY ANGIOGRAM N/A 10/3/2019    Procedure: CV CORONARY ANGIOGRAM;  Surgeon: Bryce Pierre MD;  Location:  HEART CARDIAC CATH LAB     CV RIGHT HEART CATH MEASUREMENTS RECORDED N/A 10/3/2019    Procedure: CV RIGHT HEART CATH;  Surgeon: Bryce Pierre MD;  Location:  HEART CARDIAC CATH LAB     ESOPHAGOSCOPY, GASTROSCOPY, DUODENOSCOPY (EGD), COMBINED N/A 2/25/2021    Procedure: ESOPHAGOGASTRODUODENOSCOPY, WITH BIOPSY;  Surgeon: Guru Elke Tolbert MD;  Location:  GI      EYE SURGERY  2021     HC REMOVE TONSILS/ADENOIDS,<11 Y/O      Description: Tonsillectomy With Adenoidectomy;  Recorded: 04/07/2010;     IR LUMBAR EPIDURAL STEROID INJECTION  10/26/2004     IR LUMBAR EPIDURAL STEROID INJECTION  11/16/2004     IR LUMBAR EPIDURAL STEROID INJECTION  12/21/2004     IR LUMBAR EPIDURAL STEROID INJECTION  6/8/2006     JOINT REPLACEMENT       LAMINOPLASTY CERVICAL POSTERIOR THREE+ LEVELS Left 12/21/2021    Procedure: CERVICAL 3-CERVICAL 6 LEFT OPEN DOOR LAMINOPLASTY AND LEFT CERVICAL 4-5 AND CERVICAL 6-7 POSTERIOR FORAMINOTOMY;  Surgeon: Angela Greogry MD;  Location: [facility]     LAPAROSCOPIC ADRENALECTOMY Left 08/02/2017    pheochromocytoma     LAPAROSCOPIC ADRENALECTOMY Left 8/2/2017    Procedure: LAPAROSCOPIC LEFT ADRENALECTOMY, ;  Surgeon: Gab Linares MD;  Location: [facility];  Service:      LENGTHEN TENDON ACHILLES Right 6/29/2015    Procedure: LENGTHEN TENDON ACHILLES;  Surgeon: Jason Coughlin MD;  Location: [facility]     LUMPECTOMY BREAST       LUMPECTOMY BREAST Left 1994     MAMMOPLASTY REDUCTION Right 01/13/2015    [place]     MAMMOPLASTY REDUCTION Right     approx late 2015/early2016     MASTECTOMY      left lumpectomy with axillary node dissection     MASTECTOMY MODIFIED RADICAL       OTHER SURGICAL HISTORY Right     reconstructive breast surgery     OTHER SURGICAL HISTORY      Adrenalectomy for pheochromocytoma     VT MASTECTOMY, MODIFIED RADICAL      Description: Modified Radical Mastectomy Left Breast;  Recorded: 04/07/2010;     REPAIR HAMMER TOE Right 6/29/2015    Procedure: REPAIR HAMMER TOE;  Surgeon: Jason Coughlin MD;  Location: [facility]     TONSILLECTOMY       TONSILLECTOMY & ADENOIDECTOMY       ZZC ARTHRODESIS,ANKLE,OPEN Right     Description: Ankle Arthrodesis;  Recorded: 04/07/2010;       Medications:  Current Outpatient Medications   Medication Sig Dispense Refill     albuterol (PROVENTIL) (2.5 MG/3ML) 0.083% neb solution Take 1 vial  (2.5 mg) by nebulization every 4 hours as needed for shortness of breath / dyspnea or wheezing 360 mL 5     albuterol (VENTOLIN HFA) 108 (90 Base) MCG/ACT inhaler Inhale 2 puffs into the lungs every 6 hours 18 g 11     benzonatate (TESSALON) 200 MG capsule Take 1 capsule (200 mg) by mouth 3 times daily as needed for cough 30 capsule 1     Cholecalciferol (VITAMIN D3) 250 MCG (09543 UT) TABS Take 1 tablet by mouth daily 24511/day       Cyanocobalamin (VITAMIN B-12) 5000 MCG SUBL Place 2-3 sprays under the tongue daily Unknown dose. 2 or 3 sprays/day       ethacrynic acid (EDECRIN) 25 MG tablet Take 1 tablet (25 mg) by mouth every other day 45 tablet 3     Fluticasone-Umeclidin-Vilanterol (TRELEGY ELLIPTA) 200-62.5-25 MCG/INH oral inhaler Inhale 1 puff into the lungs daily 4 each 3     HYDROcodone-acetaminophen (NORCO) 5-325 MG tablet Take 1 tablet by mouth every 6 hours as needed for breakthrough pain or moderate to severe pain 112 tablet 0     KLOR-CON 20 MEQ CR tablet Take 1 tablet (20 mEq total) by mouth 2 (two) times a day. 180 tablet 0     magnesium 250 MG tablet Take 1 tablet by mouth 2 times daily       medical cannabis (Patient's own supply) See Admin Instructions (The purpose of this order is to document that the patient reports taking medical cannabis.  This is not a prescription, and is not used to certify that the patient has a qualifying medical condition.)  Flower       omeprazole (PRILOSEC) 20 MG DR capsule Take 1 capsule (20 mg) by mouth daily 90 capsule 3     rOPINIRole (REQUIP) 2 MG tablet One tab 3 pm, one bedtime, and one during night on waking 90 tablet 3     SYNTHROID 150 MCG tablet Take 1 tablet (150 mcg) by mouth daily 90 tablet 4     vitamin E 400 units TABS Take 800 Units by mouth daily       hydrOXYzine (ATARAX) 25 MG tablet Take 1 tablet (25 mg) by mouth every 8 hours as needed for anxiety (Patient not taking: Reported on 7/25/2023) 60 tablet 3        Allergies:  Allergies   Allergen  "Reactions     Serotonin Reuptake Inhibitors (Ssris) Anxiety, Difficulty breathing, Headache, Palpitations and Shortness Of Breath     Buspirone      The patient states she had serotonin syndrome     Cephalexin      Other reaction(s): unknown rxn.     Desvenlafaxine      Serotonin syndrome     Diclofenac Sodium [Diclofenac]      Serotonin syndrome and restless legs syndrome     Gabapentin      Drove on the wrong side of the highway     Levofloxacin      \"CAN'T REMEMBER\"     Penicillins      \"SORES IN MOUTH\"     Riluzole Difficulty breathing and Swelling     Sulfa Antibiotics      \"PT DOES NOT KNOW WHAT THE REACTION WAS\"       ROS:  A 10 point ROS is negative except as stated in the HPI    Social History:  Denies any tobacco use. Denies any illicit drug use.     Family History:  Reviewed- no interval updates    Video physical exam  General: Patient appears well in no acute distress.   Skin: No visualized rash or lesions on visualized skin  Eyes: EOMI, no erythema, sclera icterus or discharge noted  Resp: Appears to be breathing comfortably without accessory muscle usage, speaking in full sentences, no cough  MSK: Appears to have normal range of motion based on visualized movements  Neurologic: No apparent tremors, facial movements symmetric  Psych: affect bright, alert and oriented      Labs: personally reviewed with relevant trends annotated below  Ferritin   Date Value Ref Range Status   07/05/2023 134 11 - 328 ng/mL Final   05/01/2023 64 11 - 328 ng/mL Final   01/18/2023 75 11 - 328 ng/mL Final   11/04/2022 85 11 - 328 ng/mL Final   07/01/2022 60 8 - 252 ng/mL Final   06/07/2022 125 10 - 130 ng/mL Final   02/05/2021 48 8 - 252 ng/mL Final   10/30/2020 20 8 - 252 ng/mL Final   02/12/2020 19 8 - 252 ng/mL Final   09/24/2019 33 8 - 252 ng/mL Final   09/19/2019 36 8 - 252 ng/mL Final   09/13/2019 42 8 - 252 ng/mL Final     Iron   Date Value Ref Range Status   05/01/2023 85 37 - 145 ug/dL Final   01/18/2023 185 (H) 37 - " 145 ug/dL Final   2022 201 (H) 37 - 145 ug/dL Final   2022 127 35 - 180 ug/dL Final   2022 216 (H) 35 - 180 ug/dL Final   2022 145 35 - 180 ug/dL Final   2021 270 (H) 35 - 180 ug/dL Final   10/01/2021 182 (H) 35 - 180 ug/dL Final   2021 141 35 - 180 ug/dL Final   10/30/2020 52 35 - 180 ug/dL Final   2019 92 35 - 180 ug/dL Final   2019 237 (H) 35 - 180 ug/dL Final     WBC   Date Value Ref Range Status   2021 6.9 4.0 - 11.0 10e9/L Final     WBC Count   Date Value Ref Range Status   2023 7.6 4.0 - 11.0 10e3/uL Final     Hemoglobin   Date Value Ref Range Status   2023 16.6 (H) 11.7 - 15.7 g/dL Final   2023 18.1 (H) 11.7 - 15.7 g/dL Final   2023 17.2 (H) 11.7 - 15.7 g/dL Final   2022 17.3 (H) 11.7 - 15.7 g/dL Final   2022 16.7 (H) 11.7 - 15.7 g/dL Final   2022 15.8 (H) 11.7 - 15.7 g/dL Final   2021 17.7 (H) 11.7 - 15.7 g/dL Final   10/30/2020 15.4 11.7 - 15.7 g/dL Final   2020 14.3 11.7 - 15.7 g/dL Final   2020 14.0 11.7 - 15.7 g/dL Final   10/03/2019 14.6 11.7 - 15.7 g/dL Final   2019 15.7 11.7 - 15.7 g/dL Final       Imagin23  EXAM: MR CERVICAL SPINE W/O CONTRAST  2023 6:56 AM      HISTORY:  cervical stenosis/RUE radic, hx laminoplasty; Cervical  radiculopathy        COMPARISON:  2022 CT     TECHNIQUE: Sagittal T1-weighted, sagittal STIR, sagittal T2-weighted,  axial and sagittal gradient echo, and axial T2-weighted images of the  cervical spine were obtained without intravenous contrast.     FINDINGS:     Straightening of the lordotic curvature with reversal at the C4-5  level. Grade 1 retrolisthesis of C4 on C5 with grade 1 anterolisthesis  of C7 on T1 and T1 on T2. Mild to moderate loss of disc height  throughout greatest at C4-5 and C5-6. Opposing degenerative endplate  changes at these levels most apparent at C4-5. Underlying marrow  signal pattern is normal. Prevertebral soft  tissues are normal.  Surgical changes of left-sided open-door laminoplasty from C3 to C6.  No abnormal cord signal.     The findings on a level by level basis are as follows:     C2-3: Left greater than right facet arthropathy with mild left-sided  uncinate hypertrophy. Mild bilateral left greater than right neural  foraminal stenosis. Thickening of the ligamentum flavum. No  significant canal stenosis.     C3-4: Posterior disc osteophyte complex with bilateral right greater  than left uncinate hypertrophy and bilateral facet arthropathy.  Moderate to severe right and mild-to-moderate left neural foraminal  stenosis. No significant spinal canal stenosis.     C4-5: Posterior disc osteophyte complex with bilateral facet  arthropathy and uncinate hypertrophy. Mild to moderate right and  moderate left foraminal stenosis. Mild flattening of the ventral cord  although no overt spinal canal stenosis.     C5-6: Posterior disc osteophyte complex with bilateral facet  arthropathy and uncinate hypertrophy. Mild to moderate bilateral  foraminal stenosis. No canal stenosis.     C6-7: Posterior disc osteophyte complex with bilateral facet  arthropathy and uncinate hypertrophy. Moderate bilateral neural  foraminal stenosis. Mild spinal canal stenosis.     C7-T1: Bilateral facet arthropathy with mild right foraminal stenosis.  No canal stenosis.     No acute abnormality in the visualized intracranial components.  Arachnoid granulation left transverse sinus..                                                                      IMPRESSION:     1. Surgical changes of left-sided open-door laminoplasty from C3 to  C6. No residual high-grade spinal canal stenosis. Mild spinal canal  stenosis is seen at the C6-7 level.  2. Multilevel neural foraminal stenosis detailed above. There is  moderate to severe right-sided neural foraminal stenosis at the C3-4.           Again, thank you for allowing me to participate in the care of your patient.         Sincerely,        Tova Pablo MD

## 2023-07-26 LAB — DEPRECATED CALCIDIOL+CALCIFEROL SERPL-MC: 38 UG/L (ref 20–75)

## 2023-07-27 ENCOUNTER — HOSPITAL ENCOUNTER (OUTPATIENT)
Dept: BEHAVIORAL HEALTH | Facility: CLINIC | Age: 68
Discharge: HOME | End: 2023-07-27
Attending: PSYCHIATRY & NEUROLOGY
Payer: MEDICARE

## 2023-07-27 DIAGNOSIS — F43.9 TRAUMA AND STRESSOR-RELATED DISORDER: ICD-10-CM

## 2023-07-27 DIAGNOSIS — F10.21 ALCOHOL USE DISORDER, MODERATE, IN SUSTAINED REMISSION (H): ICD-10-CM

## 2023-07-27 DIAGNOSIS — F41.1 GENERALIZED ANXIETY DISORDER: ICD-10-CM

## 2023-07-27 DIAGNOSIS — F31.81 BIPOLAR 2 DISORDER (H): Primary | ICD-10-CM

## 2023-07-27 LAB — METHYLMALONATE SERPL-SCNC: 0.21 UMOL/L (ref 0–0.4)

## 2023-07-27 PROCEDURE — 90853 GROUP PSYCHOTHERAPY: CPT | Performed by: SOCIAL WORKER

## 2023-07-27 PROCEDURE — 99417 PROLNG OP E/M EACH 15 MIN: CPT | Performed by: PSYCHIATRY & NEUROLOGY

## 2023-07-27 PROCEDURE — 99215 OFFICE O/P EST HI 40 MIN: CPT | Performed by: PSYCHIATRY & NEUROLOGY

## 2023-07-27 NOTE — GROUP NOTE
Psychotherapy Group Note    PATIENT'S NAME: Randi Cleary  MRN:   [id number]  :   1955  ACCT. NUMBER: [id number]  DATE OF SERVICE: 23  START TIME:  2:00 PM  END TIME:  2:50 PM  FACILITATOR: Orly Durant LICSW  TOPIC: MH EBP Group: Behavioral Activation  [facility] 55+ Program  TRACK: Clinic 1 55+    NUMBER OF PARTICIPANTS: 3    Summary of Group / Topics Discussed:  Behavioral Activation: [place]: {Patients identified situations that prompt unwanted and unhelpful emotions / thoughts / behaviors.   Patients discussed how to problem solve by proactively using coping skills in potentially difficult situations. Components included describing the situation, brainstorming coping skills, imagining how scenario can/will unfold, rehearsing the action plan, and practicing relaxation to follow.  Patients practiced using these skills to reduce symptom distress and increase effective coping  behaviors.      Patient Session Goals / Objectives:  Identify difficult situation(s), and gain proficiency with alternative behaviors / skills to problem solve.  Increase confidence using coping skills through group practice in session.  Receive and provide feedback regarding skill development.  Apply coping skills in daily life situations.      Patient Participation / Response:  Fully participated with the group by sharing personal reflections / insights and openly received / provided feedback with other participants.    Demonstrated understanding of topics discussed through group discussion and participation    Treatment Plan:  Patient has a current master individualized treatment plan.  See Epic treatment plan for more information.    ASHKAN Reeves

## 2023-07-27 NOTE — PROGRESS NOTES
"[facility]   Adult Mental Health Outpatient Programs  Provider Interval History Note    Program: Aftercare Clinic 55+    PATIENT'S NAME: Randi Cleary  MRN:   [id number]  :   1955  ACCT. NUMBER: [id number]  DATE OF SERVICE: 23    Interval History:  \"Every time I see a doctor I break down.\" Winnie presents today for follow-up and ongoing program supervision.   Endorses:  Has been meeting with more providers  Both psoriatic and rheumatoid arthritis have been ruled out; diagnosed with osteoarthritis  Likely will have surgery on the left hand and elbow  \"But they're not doing anything with my shoulders\"  Has been meeting with a physiatrist  Patient states initial examination was painful  Recent PT notes indicate patient's neck felt improved after the last visit, though patient did not mention this  Endorses developing, \"lightheadedness\"  Discontinued multiple medications, including gabapentin and lithium, to try and improve his symptom  Has not noticed an improvement in lightheadedness/vertigo  Regarding mood: \"more times up but the downs seem worse\"  Still struggling with motivation, mood  Starting with new psychiatrist in the resident clinic next week ()    Symptoms/Systems:  Sleep: often interrupted by pain  Appetite: no issues endorsed  Daily function/ADLs: limited by pain  Hygiene: no difficulties endorsed  Socialization: limited; continues to isolate. Endorses she and her partner are being more gentle with one another in their communication recently  Concerns about work or ability to work: retired, not working    Substance use:  None endorsed    Reactions/thoughts about program:  Continues to enjoy the support    Safety Assessment:  Suicidal ideation: none endorsed  Thoughts of non-suicidal self-injury: none endorsed  Recent self-injurious behavior: none endorsed  Homicidal ideation: none endorsed  Other safety concerns: none endorsed    Medications:  Current " Outpatient Medications   Medication Sig Dispense Refill    albuterol (PROVENTIL) (2.5 MG/3ML) 0.083% neb solution Take 1 vial (2.5 mg) by nebulization every 4 hours as needed for shortness of breath / dyspnea or wheezing 360 mL 5    albuterol (VENTOLIN HFA) 108 (90 Base) MCG/ACT inhaler Inhale 2 puffs into the lungs every 6 hours 18 g 11    benzonatate (TESSALON) 200 MG capsule Take 1 capsule (200 mg) by mouth 3 times daily as needed for cough 30 capsule 1    Cholecalciferol (VITAMIN D3) 250 MCG (18230 UT) TABS Take 1 tablet by mouth daily 53392/day      Cyanocobalamin (VITAMIN B-12) 5000 MCG SUBL Place 2-3 sprays under the tongue daily Unknown dose. 2 or 3 sprays/day      ethacrynic acid (EDECRIN) 25 MG tablet Take 1 tablet (25 mg) by mouth every other day 45 tablet 3    Fluticasone-Umeclidin-Vilanterol (TRELEGY ELLIPTA) 200-62.5-25 MCG/INH oral inhaler Inhale 1 puff into the lungs daily 4 each 3    HYDROcodone-acetaminophen (NORCO) 5-325 MG tablet Take 1 tablet by mouth every 6 hours as needed for breakthrough pain or moderate to severe pain 112 tablet 0    hydrOXYzine (ATARAX) 25 MG tablet Take 1 tablet (25 mg) by mouth every 8 hours as needed for anxiety (Patient not taking: Reported on 7/25/2023) 60 tablet 3    KLOR-CON 20 MEQ CR tablet Take 1 tablet (20 mEq total) by mouth 2 (two) times a day. 180 tablet 0    magnesium 250 MG tablet Take 1 tablet by mouth 2 times daily      medical cannabis (Patient's own supply) See Admin Instructions (The purpose of this order is to document that the patient reports taking medical cannabis.  This is not a prescription, and is not used to certify that the patient has a qualifying medical condition.)  Flower      omeprazole (PRILOSEC) 20 MG DR capsule Take 1 capsule (20 mg) by mouth daily 90 capsule 3    rOPINIRole (REQUIP) 2 MG tablet One tab 3 pm, one bedtime, and one during night on waking 90 tablet 3    SYNTHROID 150 MCG tablet Take 1 tablet (150 mcg) by mouth daily 90  tablet 4    vitamin E 400 units TABS Take 800 Units by mouth daily         The above list was reviewed with patient today.     Patient is taking medications as prescribed and denies adverse effects    Laboratory Results:  Most recent labs reviewed. Pertinent updates/findings:   Elevated cholesterol (200 mg/dL)  Elevated iron (229 ug/dL). Phlebotomy planned. See recent hematology note for discussion    Metrics:  PHQ-9 scores:       6/26/2023     1:15 PM 6/29/2023    10:02 AM 7/5/2023     9:57 AM 7/21/2023    10:32 AM   PHQ-9 SCORE   PHQ-9 Total Score MyChart 21 (Severe depression) 19 (Moderately severe depression) 18 (Moderately severe depression) 18 (Moderately severe depression)   PHQ-9 Total Score 21 19    19 18    18 18       CHAVO-7 scores:       6/22/2023     9:58 AM 7/6/2023     9:11 AM 7/21/2023    10:34 AM   CHAVO-7 SCORE   Total Score 17 (severe anxiety) 15 (severe anxiety) 18 (severe anxiety)   Total Score 17    17 15    15 18       CSSR-S: [place] Suicide Severity Rating Scale (Short Version)      8/12/2020     3:00 PM 12/21/2021    11:31 AM 1/9/2023     1:00 PM   [place] Suicide Severity Rating (Short Version)   Over the past 2 weeks have you felt down, depressed, or hopeless? yes yes    Over the past 2 weeks have you had thoughts of killing yourself? no no    Have you ever attempted to kill yourself? no no    Q1 Wished to be Dead (Past Month)   yes   Q2 Suicidal Thoughts (Past Month)   no   Q3 Suicidal Thought Method   no   Q4 Suicidal Intent without Specific Plan   no   Q5 Suicide Intent with Specific Plan   no   Q6 Suicide Behavior (Lifetime)   yes   Within the Past 3 Months?   no   Level of Risk per Screen   moderate risk         Mental Status Examination:  Vital Signs: There were no vitals taken for this visit.   Appearance: appropriately groomed, appears stated age, and in no apparent distress.  Attitude: cooperative   Eye Contact: good   Muscle Strength and Tone: no gross abnormalities   Psychomotor  "Behavior: normal or unremarkable   Gait and Station: deferred  Speech: normal rate, production, volume, and rhythm of  Associations: No loosening of associations  Thought Process: coherent, goal directed, and perseverating  Thought Content: no evidence of suicidal ideation or homicidal ideation, no evidence of psychotic thought, no auditory hallucinations present, and no visual hallucinations present  Mood: \"depressed\"  Affect: mood congruent, intensity is normal, constricted mobility, and reactive  Insight: good  Judgment: intact, adequate for safety  Impulse Control: intact  Oriented to: time, place, person, and situation  Attention Span and Concentration: normal  Language: Intact  Recent and Remote Memory: intact to interview. Not formally assessed. No amnesia.  Fund of Knowledge/Assessment of Intelligence: Average  Capacity of Activities of Daily Living: Independent, able to participate in programmatic care services.    Diagnosis/es:    ICD-10-CM    1. Bipolar 2 disorder (H)  F31.81       2. Generalized anxiety disorder  F41.1       3. Trauma and stressor-related disorder  F43.9       4. Alcohol use disorder, moderate, in sustained remission (H)  F10.21         Cannot rule out 309.81 (F43.10) Posttraumatic Stress Disorder (includes Posttraumatic Stress Disorder for Children 6 Years and Younger)         Assessment/Plan:  Winnie presents today for follow up. Endorses difficulties with lightheadedness and has discontinued psychotropics (among other medications) as a result. Denies that this has resulted in improvement in that symptom. Continues to endorse joint pain and shifts in mood. Has not yet returned to the swimming pool as has been previously discussed.    Medication treatment has been complicated given history of adverse effects and reactions. We will not pursue additional psychopharmacological intervention at this time and will focus on behavioral approaches, specifically swimming.    Discussed limiting " overall interventions to promote success (not trying to do too many things at once). Plan is as follows: tomorrow, Friday, July 28, patient plans to enlist the aid of her  to bring her to the swimming pool at 1:00. She will then add a day or 2 a week on a similar schedule, using EMS transportation/motivation/accountability for the time being. Patient also discussed reaching out to her friend, Vaina, with whom she previously swam and who currently swims daily.    No new medications. Continue clinic. I will follow up in approximately 3 weeks for final visit. I will also reach out to patient's future psychiatrist to coordinate care.    Bipolar depression  Overall improved   Behavioral activation per above  Continue off psychotropics given complicated course  Can consider targeted psychopharmacology for specific symptoms depending on need  Would encourage continued focus on nonpharmacological interventions, including exercise, light therapy, etc.  Continue aftercare clinic, anticipate discharge within a month    Anxiety  Overall improved  By no means remitted  Plan as noted above    Trauma/PTSD and related symptoms  Overall improved  Will benefit from continued therapy  Plan as noted above    Alcohol use in sustained remission  Overall stable  No further intervention at this time    Continue all other medications as reviewed per electronic medical record today    Continue therapy as planned:  Enrolled in aftercare clinic  I feel this patient does not meet criteria for an involuntary hold and is appropriate for treatment at an outpatient level of care.  Continue with individual therapist     Safety plan reviewed:  To the Emergency Department as needed or call after hours crisis line at [phone] or [phone]. [place] Crisis Text Line: Text [place] to 667411 or Suicide LifeLine Chat: suicidepreventionlifeline.org/chat    Follow-up:   schedule an appointment with me or another program provider in approximately in  3 week(s) or sooner if needed.  Can speak with a staff member or call the appropriate program number (see below) to schedule  Follow up with outpatient provider(s) as planned or sooner if needed for acute medical concerns.    Questions or concerns:  Call program line with questions or concerns (see below)  AddressHealthhart may be used to communicate with your provider, but this is not intended to be used for emergencies.    [facility] Adult Mental Health Program lines:  [facility]: [phone]  Dual Disorder: [phone]  Adult Day Treatment:  [phone]  55+/Intensive Outpatient: [phone]    Community Resources:    National Suicide Prevention Lifeline: 988 from any phone, or 016-150-7803 (TTY: 266.413.3230). Call anytime for help.  (www.suicidepreventionlifeline.org)  National [place] on Mental Illness (www.mary.org): 879.821.1559 or 660-735-5572.   Mental Health Association (www.mentalhealth.org): 176.696.8477 or 695-869-6435.  [place] Crisis Text Line: Text [place] to 622789  Suicide LifeLine Chat: suicideAxial.org/chat    Treatment Objective(s) Addressed in This Session:  The purpose of today's virtual visit is for this writer to provide oversight of patient's care while receiving program services. Specific treatment goals addressed included personal safety, symptoms stabilization and management, wellness and mental health, and community resources/discharge planning.     This author or another program provider will follow up with the patient as noted above.     Patient agrees with the current plan of care.    Roland Das MD  7/27/23      Visit Details:  Type of service: In-person    Location (patient and provider): [facility] Adult Mental Health Outpatient Programmatic Care Offices        Medical Decision Making  The patient's presentation was of moderate complexity (2 or more stable chronic illnesses).    The patient's evaluation involved:  discussion of management or test  interpretation with another health professional (clinic treatment team, future psychiatrist)    The patient's management necessitated moderate risk (prescription drug management, but ultimately no medications started as discussed above).     70 minutes spent on the date of the encounter doing chart review, patient visit, documentation, and discussion with other provider(s)    This document completed in part using Dragon Medical One dictation software.  Please excuse any inadvertent word or phrase substitutions.

## 2023-07-28 NOTE — GROUP NOTE
"Process Group Note    PATIENT'S NAME: Randi Cleary  MRN:   [id number]  :   1955  ACCT. NUMBER: [id number]  DATE OF SERVICE: 23  START TIME:  1:00 PM  END TIME:  1:50 PM  FACILITATOR: Orly Durant LICSW  TOPIC:  Process Group    Diagnoses:  296.33 (F33.2) Major Depressive Disorder, Recurrent Episode, Severe With anxious distress  300.02 (F41.1) Generalized Anxiety Disorder    Rule out:  296.89 Bipolar II Disorder vs. 314.01 (F90.2) Attention-Deficit/Hyperactivity Disorder   Combined presentation    [facility] 55+ Program  TRACK: Clinic 1 55+    NUMBER OF PARTICIPANTS: 3        Data:    Session content: At the start of this group, patients were invited to check in by identifying themselves, describing their current emotional status, and identifying issues to address in this group.   Area(s) of treatment focus addressed in this session included Community Resources/Discharge Planning and Develop / Improve Independent Living Skills.    Therapeutic Interventions/Treatment Strategies:  Psychotherapist offered support, feedback and validation and reinforced use of skills. Treatment modalities used include Cognitive Behavioral Therapy and Dialectical Behavioral Therapy. Interventions include Behavioral Activation: Explored how behaviors effect mood and interact with thoughts and feelings and Encouraged strategies to reduce individual procrastination and increase motivation by increasing goal-directed activities to enhance mood and reduce symptoms., Coping Skills: Facilitated discussion on learning and applying radical acceptance skill, and Mindfulness: Encouraged a plan to use mindfulness skills in daily life.    Patient reported mood as \"anxious\" during check-in. Goal for today is to \"I'm here\".  Barriers are: goal achieved of making it to group . Skills they will use include n/a.  Patient denied substance use, and reported taking medications as prescribed.  Patient denied having " "safety concerns. Patient feels proud of \"just being here\". Patient reports feeling grateful for \"the small things\". Patient processed during her check-in. Does not follow the check-in questions in a linear way, so Writer prompts pt as needed to make sure key questions are answered. Pt talked about a negative experience with her Rheumatologist. \"I didn't know what they do.\" Said she is not going back there due to being in pain after appt which she attributes to how he manipulated her limbs during appt. Also expressed unhappiness with PT's treatment this week. Said the \"good news\" is that she doesn't have R.A., but does have osteoporosis. Said there is \"no cure\" and \"no meds\" she can take for this. Writer encouraged patient providers to confirm there are no treatments to improve or stabilize this condition.     Assessment:    Patient response:   Patient responded to session by accepting feedback, being attentive, and accepting support    Possible barriers to participation / learning include:  pt arrived 20 min late to group.     Health Issues:   None reported No *new* concerns reported       Substance Use Review:   Substance Use: No active concerns identified.    Mental Status/Behavioral Observations  Appearance:   Appropriate   Eye Contact:   Good   Psychomotor Behavior: Normal   Attitude:   Cooperative   Orientation:   All  Speech   Rate / Production: Talkative Normal    Volume:  Normal   Mood:    Anxious   Affect:    Appropriate  Tearful  Thought Content:   Clear  Thought Form:  Coherent  Circumstantial    Insight:    Fair     Plan:   Safety Plan: No current safety concerns identified.  Recommended that patient call 911 or go to the local ED should there be a change in any of these risk factors.   Barriers to treatment: None identified  Patient Contracts (see media tab):  None  Substance Use: Not addressed in session   Continue or Discharge: Patient will continue in 55+ Program (55+) as planned. Patient is likely " to benefit from learning and using skills as they work toward the goals identified in their treatment plan.      Orly Durant, [facility]  July 28, 2023

## 2023-08-01 ENCOUNTER — TELEPHONE (OUTPATIENT)
Dept: FAMILY MEDICINE | Facility: CLINIC | Age: 68
End: 2023-08-01

## 2023-08-01 NOTE — TELEPHONE ENCOUNTER
Patient was last seen on 7/14/23 for hernia issue, saw PCP on 7/5/23.   Patient went into a very detailed explanation of what she needs for her feet, saying she cannot afford the inserts that she needs for her neuropathy.  I explained that she really needs an appointment to discuss this with her provider, because although she has a diagnosis of DM her AIC for the past 2 years has been in the pre diabetes range and so I am unsure if her insurance is going to cover what she is requesting.    Patient is scheduled with Dr. Zaragoza on 8/30/23 and will discuss at that time.    Wanda Yoder RN

## 2023-08-01 NOTE — TELEPHONE ENCOUNTER
[facility] phone call message- general phone call:    Reason for call: Winnie would like a call back to discuss getting shoes for neuropathy     Return call needed: Yes    OK to leave a message on voice mail? Yes    Primary language: English      needed? No    Call taken on August 1, 2023 at 8:09 AM by Michael Rowe

## 2023-08-02 ENCOUNTER — TELEPHONE (OUTPATIENT)
Dept: CARDIOLOGY | Facility: CLINIC | Age: 68
End: 2023-08-02

## 2023-08-02 NOTE — TELEPHONE ENCOUNTER
[facility]    Phone Message    May a detailed message be left on voicemail: yes     Reason for Call: Symptoms or Concerns     If patient has red-flag symptoms, warm transfer to triage line    Current symptom or concern: dizziness, nausea     Symptoms have been present for:  2 month(s)    Has patient previously been seen for this? No        Action Taken: Other: cardio    Travel Screening: Not Applicable

## 2023-08-03 ENCOUNTER — TELEPHONE (OUTPATIENT)
Dept: CARDIOLOGY | Facility: CLINIC | Age: 68
End: 2023-08-03
Payer: MEDICARE

## 2023-08-03 ENCOUNTER — VIRTUAL VISIT (OUTPATIENT)
Dept: PSYCHOLOGY | Facility: CLINIC | Age: 68
End: 2023-08-03
Payer: MEDICARE

## 2023-08-03 ENCOUNTER — MYC MEDICAL ADVICE (OUTPATIENT)
Dept: CARDIOLOGY | Facility: CLINIC | Age: 68
End: 2023-08-03
Payer: MEDICARE

## 2023-08-03 DIAGNOSIS — F31.81 BIPOLAR 2 DISORDER (H): Primary | ICD-10-CM

## 2023-08-03 DIAGNOSIS — F43.9 TRAUMA AND STRESSOR-RELATED DISORDER: ICD-10-CM

## 2023-08-03 DIAGNOSIS — F41.1 GENERALIZED ANXIETY DISORDER: ICD-10-CM

## 2023-08-03 PROCEDURE — 90837 PSYTX W PT 60 MINUTES: CPT | Mod: VID | Performed by: SOCIAL WORKER

## 2023-08-03 NOTE — PROGRESS NOTES
Acknowledgement of Current Treatment Plan       I have reviewed my treatment plan with my therapist / counselor on 7/20/2023.   I agree with the plan as it is written in the electronic health record. (Clinic 1 55+)    Name:      Signature:  Randi Cleary     Signed on paper, see media   Roland Das MD  Psychiatrist/Medical Director     See media for signature   ASHKAN Prince  Psychotherapist     See media for signature

## 2023-08-03 NOTE — TELEPHONE ENCOUNTER
Returned pt call discussing her continuous nausea and dizziness since her surgery in 2021.  Pt was instructed to monitor her blood pressures until she sees Dr Edwards on 8/15/23.  Pt verbalized understanding.    Miki Richardson RN on 8/3/2023 at 2:51 PM

## 2023-08-03 NOTE — TELEPHONE ENCOUNTER
LVM for pt c/o dizziness for 2 months.  Last appt was 1.5 months prior, VV with Dr Edwards on 6/20.    Miki Richardson RN on 8/3/2023 at 10:36 AM

## 2023-08-04 ENCOUNTER — TELEPHONE (OUTPATIENT)
Dept: BEHAVIORAL HEALTH | Facility: CLINIC | Age: 68
End: 2023-08-04

## 2023-08-04 ENCOUNTER — VIRTUAL VISIT (OUTPATIENT)
Dept: PSYCHOLOGY | Facility: CLINIC | Age: 68
End: 2023-08-04
Payer: MEDICARE

## 2023-08-04 DIAGNOSIS — F31.81 BIPOLAR 2 DISORDER (H): Primary | ICD-10-CM

## 2023-08-04 DIAGNOSIS — F41.1 GENERALIZED ANXIETY DISORDER: ICD-10-CM

## 2023-08-04 DIAGNOSIS — F43.9 TRAUMA AND STRESSOR-RELATED DISORDER: ICD-10-CM

## 2023-08-04 PROCEDURE — 90837 PSYTX W PT 60 MINUTES: CPT | Mod: VID | Performed by: SOCIAL WORKER

## 2023-08-04 NOTE — PROGRESS NOTES
[facility]                                     Progress Note    Patient Name: Randi Cleary  Date: 8/4/23         Service Type: Individual     Session Start Time: 9:06 AM Session End Time: 9:59 AM     Session Length: 53 minutes    Session #: 191    Attendees: Client attended alone    Service Modality:  Video Visit:      Provider verified identity through the following two step process.  Patient provided:  Patient is known previously to provider    Telemedicine Visit: The patient's condition can be safely assessed and treated via synchronous audio and visual telemedicine encounter.      Reason for Telemedicine Visit: Patient convenience (e.g. access to timely appointments / distance to available provider)    Originating Site (Patient Location): Patient's home    Distant Site (Provider Location): Provider Remote Setting- Home Office    Consent:  The patient/guardian has verbally consented to: the potential risks and benefits of telemedicine (video visit) versus in person care; bill my insurance or make self-payment for services provided; and responsibility for payment of non-covered services.     Patient would like the video invitation sent by:  My Chart    Mode of Communication:  Video Conference via [facility]    Distant Location (Provider):  Off-site    As the provider I attest to compliance with applicable laws and regulations related to telemedicine.      DATA  Extended Session (53+ minutes): PROLONGED SERVICE IN THE OUTPATIENT SETTING REQUIRING DIRECT (FACE-TO-FACE) PATIENT CONTACT BEYOND THE USUAL SERVICE:    - High distress and under complex circumstances.  See Data section for details  Interactive Complexity: No  Crisis: No        Progress Since Last Session (Related to Symptoms / Goals / Homework):   Symptoms:  Patient feels overwhelmed and disappointed in herself lately .      Homework: Did not complete  Contact couples counselor at [phone]  Set priorities for the next day with  your  after dinner but before 9, otherwise he will select his own priorities  Get your bed finalized for the week  Use safety plan as needed.       Episode of Care Goals: Satisfactory progress - ACTION (Actively working towards change); Intervened by reinforcing change plan / affirming steps taken     Current / Ongoing Stressors and Concerns:   Patient is currently socially isolated. She has a conflictual relationship with her .  She is getting minimal physical activity.  She has had several surgeries.      Treatment Objective(s) Addressed in This Session:   Patient will increase frequency of engaging her in ADLs.  Patient will track and record at least 5 pleasant exchanges with . Patient will be able to identify at least 5 positive traits about her .  Patient will reduce level of depressive and anxious features as evidenced by reduction in score on her CHAVO-7 and PHQ-9 (scores of 15 and 16 at first measurment, respectively).     Intervention:   Supportive Therapy:   Processed patient's concerns about their health. Talked about next steps and how to cope with the impact.       The following assessments were completed by patient for this visit:  PHQ9:       6/19/2023     3:05 PM 6/22/2023    10:02 AM 6/26/2023     1:15 PM 6/29/2023    10:02 AM 7/5/2023     9:57 AM 7/21/2023    10:32 AM 8/1/2023     1:08 PM   PHQ-9 SCORE   PHQ-9 Total Score MyChart 10 (Moderate depression) 17 (Moderately severe depression) 21 (Severe depression) 19 (Moderately severe depression) 18 (Moderately severe depression) 18 (Moderately severe depression) 19 (Moderately severe depression)   PHQ-9 Total Score 10 17 21 19    19 18    18 18 19     GAD7:       4/25/2023    12:02 PM 5/5/2023     8:07 AM 5/22/2023    10:12 AM 6/6/2023    10:44 AM 6/22/2023     9:58 AM 7/6/2023     9:11 AM 7/21/2023    10:34 AM   CHAVO-7 SCORE   Total Score 10 (moderate anxiety) 11 (moderate anxiety) 10 (moderate anxiety) 12 (moderate anxiety)  17 (severe anxiety) 15 (severe anxiety) 18 (severe anxiety)   Total Score 10    10 11 10 12 17    17 15    15 18     PROMIS 10-Global Health (only subscores and total score):       4/25/2023    12:04 PM 5/5/2023     8:12 AM 6/8/2023    12:05 PM 6/22/2023    10:03 AM 7/6/2023     9:12 AM 7/21/2023    10:36 AM 7/28/2023     3:24 PM   PROMIS-10 Scores Only   Global Mental Health Score 7    7 6 6    6 5    5 5    5 5    5 5   Global Physical Health Score 10    10 9 9    9 7    7 8    8 7    7 7   PROMIS TOTAL - SUBSCORES 17    17 15 15    15 12    12 13    13 12    12 12        ASSESSMENT: Current Emotional / Mental Status (status of significant symptoms):   Risk status (Self / Other harm or suicidal ideation)   Patient denies current fears or concerns for personal safety.   Patient reports the following current or recent suicidal ideation or behaviors: patient has persistent suicidal thoughts, but less than before.   Patient denies current or recent homicidal ideation or behaviors.   Patient denies current or recent self injurious behavior or ideation.   Patient denies other safety concerns.   Patient reports there has been no change in risk factors since their last session.     Patient reports there has been no change in protective factors since their last session.     A safety and risk management plan has been developed including: Patient consented to co-developed safety plan on 11/24/2020, updated 2/20/23.  Safety and risk management plan was reviewed.   Patient agreed to use safety plan should any safety concerns arise.  A copy was made available to the patient.     Appearance:   Appropriate    Eye Contact:   Good    Psychomotor Behavior: Normal    Attitude:   Cooperative    Orientation:   All   Speech    Rate / Production: Normal/ Responsive    Volume:  Normal    Mood:    Anxious    Affect:    Appropriate    Thought Content:  Clear    Thought Form:  Coherent    Insight:    Good      Medication Review:   No changes  to current psychiatric medication(s)     Medication Compliance:   Yes     Changes in Health Issues:   None reported     Chemical Use Review:   Substance Use: Chemical use reviewed, no active concerns identified Nothing used since 2021.     Tobacco Use: No current tobacco use.      Diagnosis:  1. Bipolar 2 disorder (H)    2. Generalized anxiety disorder    3. Trauma and stressor-related disorder        Collateral Reports Completed:   Not Applicable    PLAN: (Patient Tasks / Therapist Tasks / Other)  Contact couples counselor at [phone]  Set priorities for the next day with your  after dinner but before 9, otherwise he will select his own priorities  Get your bed finalized for the week  Use safety plan as needed.    In the future:  Look at a budget      There has been demonstrated improvement in functioning while patient has been engaged in psychotherapy/psychological service- if withdrawn the patient would deteriorate and/or relapse.     MICAELA SLADE, [facility]   2023                                                        ______________________________________________________________________    Individual Treatment Plan    Patient's Name: Randi Cleary  YOB: 1955    Date of Creation: 20  Date Treatment Plan Last Reviewed/Revised: 23    DSM5 Diagnoses: 296.89 Bipolar II Disorder Depressed, 300.02 (F41.1) Generalized Anxiety Disorder or Adjustment Disorders  309.89 (F43.8) Other Specified Trauma and Stressor Related Disorder  Psychosocial / Contextual Factors: Patient's entire family of origin has , she now has a sister-in-law and  as support.  Relationship with  is conflictual. She is recovering from surgeries  PROMIS (reviewed every 90 days): PROMIS-10 Scores  PROMIS 10-Global Health (only subscores and total score):   PROMIS-10 Scores Only 2021 3/15/2022 3/15/2022 3/15/2022 3/24/2022 2022 2022   Global Mental Health Score 6 6  "6 6 8 12 12   Global Physical Health Score 9 9 9 9 8 11 10   PROMIS TOTAL - SUBSCORES 15 15 15 15 16 23 22       Referral / Collaboration:  Referral to another professional/service is not indicated at this time..    Anticipated number of session for this episode of care: 50  Anticipation frequency of session: Biweekly  Anticipated Duration of each session: 53 or more minutes due to intensity of trauma symptoms  Treatment plan will be reviewed in 90 days or when goals have been changed.   There has been demonstrated improvement in functioning while patient has been engaged in psychotherapy/psychological service- if withdrawn the patient would deteriorate and/or relapse.       MeasurableTreatment Goal(s) related to diagnosis / functional impairment(s)  Goal 1: Patient will \"jumpstart, getting going with the things I need to be doing around the house as far as picking up, doing things, trying to do something every day.  Also to lessen the animosity between me and my .\"    I will know I've met my goal when my shoulders are fixed and I can see.      Objective #A (Patient Action)    Patient will  increase frequency of engaging her in ADLs .  Status: Continued - Date(s): 12/10/21, 3/9/22, 6/9/22, 9/2/22, 12/1/22, 3/2/23, 6/6/23    Intervention(s)  Therapist will  engage patient in CBT, specifically behavioral activation .    Objective #B  Patient will   track and record at least 5 pleasant exchanges with . Patient will be able to identify at least 5 positive traits about her  and how he relates to her .  Status: Continued - Date(s): 12/10/21, 3/9/22, 6/9/22, 9/2/22, 12/1/22, 3/2/23, 6/6/23    Intervention(s)  Therapist will  teach assertiveness skills and assign homework related to relationship interactions .    Objective #C  Patient will  reduce level of depressive and anxious features as evidenced by reduction in score on her CHAVO-7 and PHQ-9 (scores of 15 and 16 at first measurment, respectively) " ".  Status: Continued - Date(s): 12/10/21, 3/9/22, 6/9/22, 9/2/22, 12/1/22, 3/2/23, 6/6/23    Intervention(s)  Therapist will  engage patient in person-centered therapy and CBT .    Patient has reviewed and agreed to the above plan.      MICAELA SLADE, [facility]  June 6, 2023                                                   Randi Cleary          SAFETY PLAN:  Step 1: Warning signs / cues (Thoughts, images, mood, situation, behavior) that a crisis may be developing:  Thoughts: \"I don't want to continue\" \"I am unwanted\"  Images: none  Thinking Processes: ruminating  Mood: anger  Behaviors: isolating/withdrawing , can't stop crying, not taking care of myself and not taking care of my responsibilities  Situations: small triggers, such as not being able to find something, or dropping something   Step 2: Coping strategies - Things I can do to take my mind off of my problems without contacting another person (relaxation technique, physical activity):  Distress Tolerance Strategies:  arts and crafts: drawing, play with my pet , listen to positive and upbeat music: any, change body temperature (ice pack/cold water)  and paced breathing/progressive muscle relaxation  Physical Activities: go for a walk, deep breathing and stretching   Focus on helpful thoughts:  \"You've been through this before, you can get through it again.\"  Step 3: People and social settings that provide distraction:                 Name: Carmen                            Name: Darien                           Name: Aleida       pool, shopping, Carmen's house, [employer]       Step 4: Remind myself of people and things that are important to me and worth living for:  Clifford Little Donna, post-COVID world, options of what could be in your future        Step 5: When I am in crisis, I can ask these people to help me use my safety plan:                 Name: Sidney  Step 6: Making the environment safe:   go to sleep/daydream  Step 7: Professionals or agencies I can " contact during a crisis:  [facility] Daytime Number: [phone]  Suicide Prevention Lifeline: 2-728-609-UNAW (4308)  Crisis Text Line Service (available 24 hours a day, 7 days a week): Text [place] to 789859  Local Crisis Services: [facility] Crisis: [phone]  Adults can always access to the emPATH unit at [facility] (no phone number, utilize it like an urgent care or ER where you just show up)     Call 911 or go to my nearest emergency department.       I helped develop this safety plan and agree to use it when needed.  I have been given a copy of this plan.       Client signature _________________________________________________________________  Today s date:  11/24/2020  Adapted from Safety Plan Template 2008 Randi Poole and Robby Barba is reprinted with the express permission of the authors.  No portion of the Safety Plan Template may be reproduced without the express, written permission.  You can contact the authors at [email or web address] or [email or web address].

## 2023-08-04 NOTE — TELEPHONE ENCOUNTER
----- Message from Keagan Ayon sent at 8/4/2023  1:59 PM CDT -----  Regarding: more appts please  Patient Name:    Location of programming:  [facility]  Start Date: 8/9/23  Group: (#Program Track Name# [id number] on #days of the week# at #start time to end time#) ADT Clinic 1, Wed, 1-3pm  Provider: (name of MD) Roland Das  Number of visits to be scheduled:  4  Length/Duration of Appointment in minutes: 120 Min  Visit Type (VIDEO/TELEPHONE/IN-PERSON):  in person treatment  Additional notes: Please make more appts for pt

## 2023-08-07 ENCOUNTER — VIRTUAL VISIT (OUTPATIENT)
Dept: PSYCHOLOGY | Facility: CLINIC | Age: 68
End: 2023-08-07
Payer: MEDICARE

## 2023-08-07 DIAGNOSIS — F41.1 GENERALIZED ANXIETY DISORDER: ICD-10-CM

## 2023-08-07 DIAGNOSIS — F43.9 TRAUMA AND STRESSOR-RELATED DISORDER: ICD-10-CM

## 2023-08-07 DIAGNOSIS — F31.81 BIPOLAR 2 DISORDER (H): Primary | ICD-10-CM

## 2023-08-07 PROCEDURE — 90837 PSYTX W PT 60 MINUTES: CPT | Mod: VID | Performed by: SOCIAL WORKER

## 2023-08-07 ASSESSMENT — PATIENT HEALTH QUESTIONNAIRE - PHQ9
SUM OF ALL RESPONSES TO PHQ QUESTIONS 1-9: 21
10. IF YOU CHECKED OFF ANY PROBLEMS, HOW DIFFICULT HAVE THESE PROBLEMS MADE IT FOR YOU TO DO YOUR WORK, TAKE CARE OF THINGS AT HOME, OR GET ALONG WITH OTHER PEOPLE: VERY DIFFICULT
SUM OF ALL RESPONSES TO PHQ QUESTIONS 1-9: 21

## 2023-08-07 ASSESSMENT — ANXIETY QUESTIONNAIRES
1. FEELING NERVOUS, ANXIOUS, OR ON EDGE: NEARLY EVERY DAY
4. TROUBLE RELAXING: NEARLY EVERY DAY
6. BECOMING EASILY ANNOYED OR IRRITABLE: MORE THAN HALF THE DAYS
2. NOT BEING ABLE TO STOP OR CONTROL WORRYING: NEARLY EVERY DAY
1. FEELING NERVOUS, ANXIOUS, OR ON EDGE: NEARLY EVERY DAY
GAD7 TOTAL SCORE: 18
3. WORRYING TOO MUCH ABOUT DIFFERENT THINGS: MORE THAN HALF THE DAYS
7. FEELING AFRAID AS IF SOMETHING AWFUL MIGHT HAPPEN: NEARLY EVERY DAY
3. WORRYING TOO MUCH ABOUT DIFFERENT THINGS: MORE THAN HALF THE DAYS
IF YOU CHECKED OFF ANY PROBLEMS ON THIS QUESTIONNAIRE, HOW DIFFICULT HAVE THESE PROBLEMS MADE IT FOR YOU TO DO YOUR WORK, TAKE CARE OF THINGS AT HOME, OR GET ALONG WITH OTHER PEOPLE: VERY DIFFICULT
7. FEELING AFRAID AS IF SOMETHING AWFUL MIGHT HAPPEN: NEARLY EVERY DAY
6. BECOMING EASILY ANNOYED OR IRRITABLE: MORE THAN HALF THE DAYS
GAD7 TOTAL SCORE: 18
2. NOT BEING ABLE TO STOP OR CONTROL WORRYING: NEARLY EVERY DAY
GAD7 TOTAL SCORE: 18
5. BEING SO RESTLESS THAT IT IS HARD TO SIT STILL: MORE THAN HALF THE DAYS
4. TROUBLE RELAXING: NEARLY EVERY DAY
5. BEING SO RESTLESS THAT IT IS HARD TO SIT STILL: MORE THAN HALF THE DAYS
GAD7 TOTAL SCORE: 18
IF YOU CHECKED OFF ANY PROBLEMS ON THIS QUESTIONNAIRE, HOW DIFFICULT HAVE THESE PROBLEMS MADE IT FOR YOU TO DO YOUR WORK, TAKE CARE OF THINGS AT HOME, OR GET ALONG WITH OTHER PEOPLE: VERY DIFFICULT

## 2023-08-07 NOTE — PROGRESS NOTES
[facility]                                     Progress Note    Patient Name: Randi Cleary  Date: 8/7/23         Service Type: Individual     Session Start Time: 3:31 PM Session End Time: 4:26 PM     Session Length: 55 minutes    Session #: 192    Attendees: Client attended alone    Service Modality:  Video Visit:      Provider verified identity through the following two step process.  Patient provided:  Patient is known previously to provider    Telemedicine Visit: The patient's condition can be safely assessed and treated via synchronous audio and visual telemedicine encounter.      Reason for Telemedicine Visit: Patient convenience (e.g. access to timely appointments / distance to available provider)    Originating Site (Patient Location): Patient's home    Distant Site (Provider Location): Provider Remote Setting- Home Office    Consent:  The patient/guardian has verbally consented to: the potential risks and benefits of telemedicine (video visit) versus in person care; bill my insurance or make self-payment for services provided; and responsibility for payment of non-covered services.     Patient would like the video invitation sent by:  My Chart    Mode of Communication:  Video Conference via [facility]    Distant Location (Provider):  Off-site    As the provider I attest to compliance with applicable laws and regulations related to telemedicine.      DATA  Extended Session (53+ minutes): PROLONGED SERVICE IN THE OUTPATIENT SETTING REQUIRING DIRECT (FACE-TO-FACE) PATIENT CONTACT BEYOND THE USUAL SERVICE:    - High distress and under complex circumstances.  See Data section for details  Interactive Complexity: No  Crisis: No        Progress Since Last Session (Related to Symptoms / Goals / Homework):   Symptoms:  Patient has moments she is really anxious and feels overwhelmed, but is able to remind herself that she always gets through them .      Homework: Did not complete  Contact  "couples counselor at [phone]  Set priorities for the next day with your  after dinner but before 9, otherwise he will select his own priorities  Get your bed finalized for the week  Use safety plan as needed.       Episode of Care Goals: Satisfactory progress - ACTION (Actively working towards change); Intervened by reinforcing change plan / affirming steps taken     Current / Ongoing Stressors and Concerns:   Patient is currently socially isolated. She has a conflictual relationship with her .  She is getting minimal physical activity.  She has had several surgeries.      Treatment Objective(s) Addressed in This Session:   Patient will increase frequency of engaging her in ADLs.  Patient will track and record at least 5 pleasant exchanges with . Patient will be able to identify at least 5 positive traits about her .  Patient will reduce level of depressive and anxious features as evidenced by reduction in score on her CHAVO-7 and PHQ-9 (scores of 15 and 16 at first measurment, respectively).     Intervention:   Supportive Therapy:   Discussed social support she's receiving, including a message she received from a friend stating \"FEAR: forgetting everything (is) all right.\" Talked about how she's prioritizing her health and taking care of her mental health. Discussed ways to find britney.        The following assessments were completed by patient for this visit:  PHQ9:       6/22/2023    10:02 AM 6/26/2023     1:15 PM 6/29/2023    10:02 AM 7/5/2023     9:57 AM 7/21/2023    10:32 AM 8/1/2023     1:08 PM 8/7/2023    12:59 PM   PHQ-9 SCORE   PHQ-9 Total Score MyChart 17 (Moderately severe depression) 21 (Severe depression) 19 (Moderately severe depression) 18 (Moderately severe depression) 18 (Moderately severe depression) 19 (Moderately severe depression) 21 (Severe depression)   PHQ-9 Total Score 17 21 19    19 18    18 18 19 21     GAD7:       5/5/2023     8:07 AM 5/22/2023    10:12 AM " 6/6/2023    10:44 AM 6/22/2023     9:58 AM 7/6/2023     9:11 AM 7/21/2023    10:34 AM 8/7/2023     1:03 PM   CHAVO-7 SCORE   Total Score 11 (moderate anxiety) 10 (moderate anxiety) 12 (moderate anxiety) 17 (severe anxiety) 15 (severe anxiety) 18 (severe anxiety) 18 (severe anxiety)   Total Score 11 10 12 17    17 15    15 18 18    18     PROMIS 10-Global Health (only subscores and total score):       5/5/2023     8:12 AM 6/8/2023    12:05 PM 6/22/2023    10:03 AM 7/6/2023     9:12 AM 7/21/2023    10:36 AM 7/28/2023     3:24 PM 8/7/2023     1:07 PM   PROMIS-10 Scores Only   Global Mental Health Score 6 6    6 5    5 5    5 5    5  6    6   Global Physical Health Score 9 9    9 7    7 8    8 7    7  8    8   PROMIS TOTAL - SUBSCORES 15 15    15 12    12 13    13 12    12  14    14       Information is confidential and restricted. Go to Review Flowsheets to unlock data.    Multiple values from one day are sorted in reverse-chronological order        ASSESSMENT: Current Emotional / Mental Status (status of significant symptoms):   Risk status (Self / Other harm or suicidal ideation)   Patient denies current fears or concerns for personal safety.   Patient reports the following current or recent suicidal ideation or behaviors: patient has persistent suicidal thoughts, but less than before.   Patient denies current or recent homicidal ideation or behaviors.   Patient denies current or recent self injurious behavior or ideation.   Patient denies other safety concerns.   Patient reports there has been no change in risk factors since their last session.     Patient reports there has been no change in protective factors since their last session.     A safety and risk management plan has been developed including: Patient consented to co-developed safety plan on 11/24/2020, updated 2/20/23.  Safety and risk management plan was reviewed.   Patient agreed to use safety plan should any safety concerns arise.  A copy was made  available to the patient.     Appearance:   Appropriate    Eye Contact:   Good    Psychomotor Behavior: Normal    Attitude:   Cooperative    Orientation:   All   Speech    Rate / Production: Normal/ Responsive    Volume:  Normal    Mood:    Anxious    Affect:    Appropriate    Thought Content:  Clear    Thought Form:  Coherent    Insight:    Good      Medication Review:   No changes to current psychiatric medication(s)     Medication Compliance:   Yes     Changes in Health Issues:   None reported     Chemical Use Review:   Substance Use: Chemical use reviewed, no active concerns identified Nothing used since 2021.     Tobacco Use: No current tobacco use.      Diagnosis:  1. Bipolar 2 disorder (H)    2. Generalized anxiety disorder    3. Trauma and stressor-related disorder          Collateral Reports Completed:   Not Applicable    PLAN: (Patient Tasks / Therapist Tasks / Other)  Continue to find ways to find britney.  Use safety plan as needed.    In the future:  Look at a budget      There has been demonstrated improvement in functioning while patient has been engaged in psychotherapy/psychological service- if withdrawn the patient would deteriorate and/or relapse.     MICAELA SLADE, [facility]   2023                                                        ______________________________________________________________________    Individual Treatment Plan    Patient's Name: Randi Cleary  YOB: 1955    Date of Creation: 20  Date Treatment Plan Last Reviewed/Revised: 23    DSM5 Diagnoses: 296.89 Bipolar II Disorder Depressed, 300.02 (F41.1) Generalized Anxiety Disorder or Adjustment Disorders  309.89 (F43.8) Other Specified Trauma and Stressor Related Disorder  Psychosocial / Contextual Factors: Patient's entire family of origin has , she now has a sister-in-law and  as support.  Relationship with  is conflictual. She is recovering from surgeries  PROMIS  "(reviewed every 90 days): PROMIS-10 Scores  PROMIS 10-Global Health (only subscores and total score):   PROMIS-10 Scores Only 12/14/2021 3/15/2022 3/15/2022 3/15/2022 3/24/2022 4/1/2022 6/9/2022   Global Mental Health Score 6 6 6 6 8 12 12   Global Physical Health Score 9 9 9 9 8 11 10   PROMIS TOTAL - SUBSCORES 15 15 15 15 16 23 22       Referral / Collaboration:  Referral to another professional/service is not indicated at this time..    Anticipated number of session for this episode of care: 50  Anticipation frequency of session: Biweekly  Anticipated Duration of each session: 53 or more minutes due to intensity of trauma symptoms  Treatment plan will be reviewed in 90 days or when goals have been changed.   There has been demonstrated improvement in functioning while patient has been engaged in psychotherapy/psychological service- if withdrawn the patient would deteriorate and/or relapse.       MeasurableTreatment Goal(s) related to diagnosis / functional impairment(s)  Goal 1: Patient will \"jumpstart, getting going with the things I need to be doing around the house as far as picking up, doing things, trying to do something every day.  Also to lessen the animosity between me and my .\"    I will know I've met my goal when my shoulders are fixed and I can see.      Objective #A (Patient Action)    Patient will  increase frequency of engaging her in ADLs .  Status: Continued - Date(s): 12/10/21, 3/9/22, 6/9/22, 9/2/22, 12/1/22, 3/2/23, 6/6/23    Intervention(s)  Therapist will  engage patient in CBT, specifically behavioral activation .    Objective #B  Patient will   track and record at least 5 pleasant exchanges with . Patient will be able to identify at least 5 positive traits about her  and how he relates to her .  Status: Continued - Date(s): 12/10/21, 3/9/22, 6/9/22, 9/2/22, 12/1/22, 3/2/23, 6/6/23    Intervention(s)  Therapist will  teach assertiveness skills and assign homework related " "to relationship interactions .    Objective #C  Patient will  reduce level of depressive and anxious features as evidenced by reduction in score on her CHAVO-7 and PHQ-9 (scores of 15 and 16 at first measurment, respectively) .  Status: Continued - Date(s): 12/10/21, 3/9/22, 6/9/22, 9/2/22, 12/1/22, 3/2/23, 6/6/23    Intervention(s)  Therapist will  engage patient in person-centered therapy and CBT .    Patient has reviewed and agreed to the above plan.      MICAELA SLADE, [facility]  June 6, 2023                                                   Randi Cleary          SAFETY PLAN:  Step 1: Warning signs / cues (Thoughts, images, mood, situation, behavior) that a crisis may be developing:  Thoughts: \"I don't want to continue\" \"I am unwanted\"  Images: none  Thinking Processes: ruminating  Mood: anger  Behaviors: isolating/withdrawing , can't stop crying, not taking care of myself and not taking care of my responsibilities  Situations: small triggers, such as not being able to find something, or dropping something   Step 2: Coping strategies - Things I can do to take my mind off of my problems without contacting another person (relaxation technique, physical activity):  Distress Tolerance Strategies:  arts and crafts: drawing, play with my pet , listen to positive and upbeat music: any, change body temperature (ice pack/cold water)  and paced breathing/progressive muscle relaxation  Physical Activities: go for a walk, deep breathing and stretching   Focus on helpful thoughts:  \"You've been through this before, you can get through it again.\"  Step 3: People and social settings that provide distraction:                 Name: Carmen                            Name: Darien                           Name: Aleida       pool, shopping, Carmen's house, [employer]       Step 4: Remind myself of people and things that are important to me and worth living for:  Clifford Little Donna, post-COVID world, options of what could be in your " future        Step 5: When I am in crisis, I can ask these people to help me use my safety plan:                 Name: Sidney  Step 6: Making the environment safe:   go to sleep/daydream  Step 7: Professionals or agencies I can contact during a crisis:  [facility] Number: [phone]  Suicide Prevention Lifeline: 2-739-580-TALK (8255)  Crisis Text Line Service (available 24 hours a day, 7 days a week): Text [place] to 796462  Local Crisis Services: [facility] Crisis: [phone]  Adults can always access to the emPATH unit at [facility] (no phone number, utilize it like an urgent care or ER where you just show up)     Call 911 or go to my nearest emergency department.       I helped develop this safety plan and agree to use it when needed.  I have been given a copy of this plan.       Client signature _________________________________________________________________  Today s date:  11/24/2020  Adapted from Safety Plan Template 2008 Randi Poole and Robby Barba is reprinted with the express permission of the authors.  No portion of the Safety Plan Template may be reproduced without the express, written permission.  You can contact the authors at [email or web address] or [email or web address].

## 2023-08-08 ENCOUNTER — TRANSFERRED RECORDS (OUTPATIENT)
Dept: HEALTH INFORMATION MANAGEMENT | Facility: CLINIC | Age: 68
End: 2023-08-08
Payer: MEDICARE

## 2023-08-09 ENCOUNTER — VIRTUAL VISIT (OUTPATIENT)
Dept: PSYCHOLOGY | Facility: CLINIC | Age: 68
End: 2023-08-09
Payer: MEDICARE

## 2023-08-09 DIAGNOSIS — F43.9 TRAUMA AND STRESSOR-RELATED DISORDER: ICD-10-CM

## 2023-08-09 DIAGNOSIS — F41.1 GENERALIZED ANXIETY DISORDER: ICD-10-CM

## 2023-08-09 DIAGNOSIS — F31.81 BIPOLAR 2 DISORDER (H): Primary | ICD-10-CM

## 2023-08-09 PROCEDURE — 90834 PSYTX W PT 45 MINUTES: CPT | Mod: VID | Performed by: SOCIAL WORKER

## 2023-08-09 NOTE — PROGRESS NOTES
[facility]                                     Progress Note    Patient Name: Randi Cleary  Date: 8/9/23         Service Type: Individual     Session Start Time: 2:03 PM Session End Time: 2:44 PM     Session Length: 41 minutes    Session #: 193    Attendees: Client attended alone    Service Modality:  Video Visit:      Provider verified identity through the following two step process.  Patient provided:  Patient is known previously to provider    Telemedicine Visit: The patient's condition can be safely assessed and treated via synchronous audio and visual telemedicine encounter.      Reason for Telemedicine Visit: Patient convenience (e.g. access to timely appointments / distance to available provider)    Originating Site (Patient Location):  Parking lot of [facility] in [place], [place].    Distant Site (Provider Location): Provider Remote Setting- Home Office    Consent:  The patient/guardian has verbally consented to: the potential risks and benefits of telemedicine (video visit) versus in person care; bill my insurance or make self-payment for services provided; and responsibility for payment of non-covered services.     Patient would like the video invitation sent by:  My Chart    Mode of Communication:  Video Conference via [employer]well    Distant Location (Provider):  Off-site    As the provider I attest to compliance with applicable laws and regulations related to telemedicine.      DATA  Extended Session (53+ minutes): No  Interactive Complexity: No  Crisis: No        Progress Since Last Session (Related to Symptoms / Goals / Homework):   Symptoms:  Patient has been feeling more hopeful .      Homework: Did not complete  Continue to find ways to find britney.  Use safety plan as needed.       Episode of Care Goals: Satisfactory progress - ACTION (Actively working towards change); Intervened by reinforcing change plan / affirming steps taken     Current / Ongoing Stressors and  Concerns:   Patient is currently socially isolated. She has a conflictual relationship with her .  She is getting minimal physical activity.  She has had several surgeries.      Treatment Objective(s) Addressed in This Session:   Patient will increase frequency of engaging her in ADLs.  Patient will track and record at least 5 pleasant exchanges with . Patient will be able to identify at least 5 positive traits about her .  Patient will reduce level of depressive and anxious features as evidenced by reduction in score on her CHAVO-7 and PHQ-9 (scores of 15 and 16 at first measurment, respectively).     Intervention:   Supportive Therapy:   Processed changes with patient. Looked at how she found some hope and how to continue with this. Talked about supports kat wants to continue to build on, including looking at couples therapy. Reviewed homework and identified successes and barriers to completion.    The following assessments were completed by patient for this visit:  PHQ9:       6/26/2023     1:15 PM 6/29/2023    10:02 AM 7/5/2023     9:57 AM 7/21/2023    10:32 AM 8/1/2023     1:08 PM 8/7/2023    12:59 PM 8/9/2023     8:30 AM   PHQ-9 SCORE   PHQ-9 Total Score MyChart 21 (Severe depression) 19 (Moderately severe depression) 18 (Moderately severe depression) 18 (Moderately severe depression) 19 (Moderately severe depression) 21 (Severe depression) 19 (Moderately severe depression)   PHQ-9 Total Score 21 19    19 18    18 18 19 21 19     GAD7:       5/5/2023     8:07 AM 5/22/2023    10:12 AM 6/6/2023    10:44 AM 6/22/2023     9:58 AM 7/6/2023     9:11 AM 7/21/2023    10:34 AM 8/7/2023     1:03 PM   CHAVO-7 SCORE   Total Score 11 (moderate anxiety) 10 (moderate anxiety) 12 (moderate anxiety) 17 (severe anxiety) 15 (severe anxiety) 18 (severe anxiety) 18 (severe anxiety)   Total Score 11 10 12 17    17 15    15 18 18    18     PROMIS 10-Global Health (only subscores and total score):       5/5/2023      8:12 AM 6/8/2023    12:05 PM 6/22/2023    10:03 AM 7/6/2023     9:12 AM 7/21/2023    10:36 AM 7/28/2023     3:24 PM 8/7/2023     1:07 PM   PROMIS-10 Scores Only   Global Mental Health Score 6 6    6 5    5 5    5 5    5  6    6   Global Physical Health Score 9 9    9 7    7 8    8 7    7  8    8   PROMIS TOTAL - SUBSCORES 15 15    15 12    12 13    13 12    12  14    14       Information is confidential and restricted. Go to Review Flowsheets to unlock data.    Multiple values from one day are sorted in reverse-chronological order        ASSESSMENT: Current Emotional / Mental Status (status of significant symptoms):   Risk status (Self / Other harm or suicidal ideation)   Patient denies current fears or concerns for personal safety.   Patient denies current or recent suicidal ideation or behaviors.   Patient denies current or recent homicidal ideation or behaviors.   Patient denies current or recent self injurious behavior or ideation.   Patient denies other safety concerns.   Patient reports there has been no change in risk factors since their last session.     Patient reports there has been no change in protective factors since their last session.     A safety and risk management plan has been developed including: Patient consented to co-developed safety plan on 11/24/2020, updated 2/20/23.  Safety and risk management plan was reviewed.   Patient agreed to use safety plan should any safety concerns arise.  A copy was made available to the patient.     Appearance:   Appropriate    Eye Contact:   Good    Psychomotor Behavior: Normal    Attitude:   Cooperative    Orientation:   All   Speech    Rate / Production: Normal/ Responsive    Volume:  Normal    Mood:    Anxious    Affect:    Appropriate    Thought Content:  Clear    Thought Form:  Coherent    Insight:    Good      Medication Review:   No changes to current psychiatric medication(s)     Medication Compliance:   Yes     Changes in Health Issues:   None  reported     Chemical Use Review:   Substance Use: Chemical use reviewed, no active concerns identified Nothing used since 2021.     Tobacco Use: No current tobacco use.      Diagnosis:  1. Bipolar 2 disorder (H)    2. Generalized anxiety disorder    3. Trauma and stressor-related disorder      Collateral Reports Completed:   Not Applicable    PLAN: (Patient Tasks / Therapist Tasks / Other)  Contact couples counselor at [phone]   Continue to find ways to find britney.  Use safety plan as needed.  Work on setting up bedroom    In the future:  Look at a budget      There has been demonstrated improvement in functioning while patient has been engaged in psychotherapy/psychological service- if withdrawn the patient would deteriorate and/or relapse.     MICAELA SLADE, [facility]   2023                                                        ______________________________________________________________________    Individual Treatment Plan    Patient's Name: Randi Cleary  YOB: 1955    Date of Creation: 20  Date Treatment Plan Last Reviewed/Revised: 23    DSM5 Diagnoses: 296.89 Bipolar II Disorder Depressed, 300.02 (F41.1) Generalized Anxiety Disorder or Adjustment Disorders  309.89 (F43.8) Other Specified Trauma and Stressor Related Disorder  Psychosocial / Contextual Factors: Patient's entire family of origin has , she now has a sister-in-law and  as support.  Relationship with  is conflictual. She is recovering from surgeries  PROMIS (reviewed every 90 days): PROMIS-10 Scores  PROMIS 10-Global Health (only subscores and total score):   PROMIS-10 Scores Only 2021 3/15/2022 3/15/2022 3/15/2022 3/24/2022 2022 2022   Global Mental Health Score 6 6 6 6 8 12 12   Global Physical Health Score 9 9 9 9 8 11 10   PROMIS TOTAL - SUBSCORES 15 15 15 15 16 23 22       Referral / Collaboration:  Referral to another professional/service is not indicated at  "this time..    Anticipated number of session for this episode of care: 50  Anticipation frequency of session: Biweekly  Anticipated Duration of each session: 53 or more minutes due to intensity of trauma symptoms  Treatment plan will be reviewed in 90 days or when goals have been changed.   There has been demonstrated improvement in functioning while patient has been engaged in psychotherapy/psychological service- if withdrawn the patient would deteriorate and/or relapse.       MeasurableTreatment Goal(s) related to diagnosis / functional impairment(s)  Goal 1: Patient will \"jumpstart, getting going with the things I need to be doing around the house as far as picking up, doing things, trying to do something every day.  Also to lessen the animosity between me and my .\"    I will know I've met my goal when my shoulders are fixed and I can see.      Objective #A (Patient Action)    Patient will  increase frequency of engaging her in ADLs .  Status: Continued - Date(s): 12/10/21, 3/9/22, 6/9/22, 9/2/22, 12/1/22, 3/2/23, 6/6/23    Intervention(s)  Therapist will  engage patient in CBT, specifically behavioral activation .    Objective #B  Patient will   track and record at least 5 pleasant exchanges with . Patient will be able to identify at least 5 positive traits about her  and how he relates to her .  Status: Continued - Date(s): 12/10/21, 3/9/22, 6/9/22, 9/2/22, 12/1/22, 3/2/23, 6/6/23    Intervention(s)  Therapist will  teach assertiveness skills and assign homework related to relationship interactions .    Objective #C  Patient will  reduce level of depressive and anxious features as evidenced by reduction in score on her CHAVO-7 and PHQ-9 (scores of 15 and 16 at first measurment, respectively) .  Status: Continued - Date(s): 12/10/21, 3/9/22, 6/9/22, 9/2/22, 12/1/22, 3/2/23, 6/6/23    Intervention(s)  Therapist will  engage patient in person-centered therapy and CBT .    Patient has reviewed " "and agreed to the above plan.      CRUZ MICAELA BAKER JO, [facility]  June 6, 2023                                                   Randi Cleary          SAFETY PLAN:  Step 1: Warning signs / cues (Thoughts, images, mood, situation, behavior) that a crisis may be developing:  Thoughts: \"I don't want to continue\" \"I am unwanted\"  Images: none  Thinking Processes: ruminating  Mood: anger  Behaviors: isolating/withdrawing , can't stop crying, not taking care of myself and not taking care of my responsibilities  Situations: small triggers, such as not being able to find something, or dropping something   Step 2: Coping strategies - Things I can do to take my mind off of my problems without contacting another person (relaxation technique, physical activity):  Distress Tolerance Strategies:  arts and crafts: drawing, play with my pet , listen to positive and upbeat music: any, change body temperature (ice pack/cold water)  and paced breathing/progressive muscle relaxation  Physical Activities: go for a walk, deep breathing and stretching   Focus on helpful thoughts:  \"You've been through this before, you can get through it again.\"  Step 3: People and social settings that provide distraction:                 Name: Carmen                            Name: Darien                           Name: Aleida       pool, shopping, Carmen's house, [employer]       Step 4: Remind myself of people and things that are important to me and worth living for:  Clifford Little Donna, post-COVID world, options of what could be in your future        Step 5: When I am in crisis, I can ask these people to help me use my safety plan:                 Name: Sidney  Step 6: Making the environment safe:   go to sleep/daydream  Step 7: Professionals or agencies I can contact during a crisis:  [facility] Daytime Number: [phone]  Suicide Prevention Lifeline: 3-477-200-TALK (1095)  Crisis Text Line Service (available 24 hours a day, 7 days a " week): Text [place] to 238643  Local Crisis Services: [facility] Crisis: [phone]  Adults can always access to the emPATH unit at [facility] (no phone number, utilize it like an urgent care or ER where you just show up)     Call 911 or go to my nearest emergency department.       I helped develop this safety plan and agree to use it when needed.  I have been given a copy of this plan.       Client signature _________________________________________________________________  Today s date:  11/24/2020  Adapted from Safety Plan Template 2008 Randi Poole and Robby Barba is reprinted with the express permission of the authors.  No portion of the Safety Plan Template may be reproduced without the express, written permission.  You can contact the authors at [email or web address] or [email or web address].

## 2023-08-14 ENCOUNTER — VIRTUAL VISIT (OUTPATIENT)
Dept: PSYCHOLOGY | Facility: CLINIC | Age: 68
End: 2023-08-14
Payer: MEDICARE

## 2023-08-14 DIAGNOSIS — F31.81 BIPOLAR 2 DISORDER (H): Primary | ICD-10-CM

## 2023-08-14 DIAGNOSIS — F41.1 GENERALIZED ANXIETY DISORDER: ICD-10-CM

## 2023-08-14 DIAGNOSIS — F43.9 TRAUMA AND STRESSOR-RELATED DISORDER: ICD-10-CM

## 2023-08-14 PROCEDURE — 90837 PSYTX W PT 60 MINUTES: CPT | Mod: VID | Performed by: SOCIAL WORKER

## 2023-08-14 NOTE — PROGRESS NOTES
"[facility]                                     Progress Note    Patient Name: Randi Cleary  Date: 8/14/23         Service Type: Individual     Session Start Time: 11:00 AM Session End Time: 11:55 AM     Session Length: 55 minutes    Session #: 194    Attendees: Client attended alone    Service Modality:  Video Visit:      Provider verified identity through the following two step process.  Patient provided:  Patient is known previously to provider    Telemedicine Visit: The patient's condition can be safely assessed and treated via synchronous audio and visual telemedicine encounter.      Reason for Telemedicine Visit: Patient convenience (e.g. access to timely appointments / distance to available provider)    Originating Site (Patient Location): Patient's home    Distant Site (Provider Location): Provider Remote Setting- Home Office    Consent:  The patient/guardian has verbally consented to: the potential risks and benefits of telemedicine (video visit) versus in person care; bill my insurance or make self-payment for services provided; and responsibility for payment of non-covered services.     Patient would like the video invitation sent by:  My Chart    Mode of Communication:  Video Conference via [facility]    Distant Location (Provider):  Off-site    As the provider I attest to compliance with applicable laws and regulations related to telemedicine.      DATA  Extended Session (53+ minutes): PROLONGED SERVICE IN THE OUTPATIENT SETTING REQUIRING DIRECT (FACE-TO-FACE) PATIENT CONTACT BEYOND THE USUAL SERVICE:    - Patient's presenting concerns require more intensive intervention than could be completed within the usual service  Interactive Complexity: No  Crisis: No        Progress Since Last Session (Related to Symptoms / Goals / Homework):   Symptoms:  \"I'm on a wire, and teetering on it, it's been a little vanessa.\"      Homework:  Progress made  Contact couples counselor at [phone] "   Continue to find ways to find britney.  Use safety plan as needed.  Work on setting up bedroom       Episode of Care Goals: Satisfactory progress - ACTION (Actively working towards change); Intervened by reinforcing change plan / affirming steps taken     Current / Ongoing Stressors and Concerns:   Patient is currently socially isolated. She has a conflictual relationship with her .  She is getting minimal physical activity.  She has had several surgeries.      Treatment Objective(s) Addressed in This Session:   Patient will increase frequency of engaging her in ADLs.  Patient will track and record at least 5 pleasant exchanges with . Patient will be able to identify at least 5 positive traits about her .  Patient will reduce level of depressive and anxious features as evidenced by reduction in score on her CHAVO-7 and PHQ-9 (scores of 15 and 16 at first measurment, respectively).     Intervention:   Supportive Therapy:   Processed life stressor's with friend's health. Also reviewed her own health. Looked at how she's caring for herself. Reviewed med next steps. Looked at coping skills to help get through all of this, including taking one thing at a time, which patient was able to identify where to start with each thing she was working on.    The following assessments were completed by patient for this visit:  PHQ9:       6/26/2023     1:15 PM 6/29/2023    10:02 AM 7/5/2023     9:57 AM 7/21/2023    10:32 AM 8/1/2023     1:08 PM 8/7/2023    12:59 PM 8/9/2023     8:30 AM   PHQ-9 SCORE   PHQ-9 Total Score MyChart 21 (Severe depression) 19 (Moderately severe depression) 18 (Moderately severe depression) 18 (Moderately severe depression) 19 (Moderately severe depression) 21 (Severe depression) 19 (Moderately severe depression)   PHQ-9 Total Score 21 19    19 18    18 18 19 21 19     GAD7:       5/5/2023     8:07 AM 5/22/2023    10:12 AM 6/6/2023    10:44 AM 6/22/2023     9:58 AM 7/6/2023     9:11 AM  7/21/2023    10:34 AM 8/7/2023     1:03 PM   CHAVO-7 SCORE   Total Score 11 (moderate anxiety) 10 (moderate anxiety) 12 (moderate anxiety) 17 (severe anxiety) 15 (severe anxiety) 18 (severe anxiety) 18 (severe anxiety)   Total Score 11 10 12 17    17 15    15 18 18    18     PROMIS 10-Global Health (only subscores and total score):       5/5/2023     8:12 AM 6/8/2023    12:05 PM 6/22/2023    10:03 AM 7/6/2023     9:12 AM 7/21/2023    10:36 AM 7/28/2023     3:24 PM 8/7/2023     1:07 PM   PROMIS-10 Scores Only   Global Mental Health Score 6 6    6 5    5 5    5 5    5  6    6   Global Physical Health Score 9 9    9 7    7 8    8 7    7  8    8   PROMIS TOTAL - SUBSCORES 15 15    15 12    12 13    13 12    12  14    14       Information is confidential and restricted. Go to Review Flowsheets to unlock data.    Multiple values from one day are sorted in reverse-chronological order        ASSESSMENT: Current Emotional / Mental Status (status of significant symptoms):   Risk status (Self / Other harm or suicidal ideation)   Patient denies current fears or concerns for personal safety.   Patient denies current or recent suicidal ideation or behaviors.   Patient denies current or recent homicidal ideation or behaviors.   Patient denies current or recent self injurious behavior or ideation.   Patient denies other safety concerns.   Patient reports there has been no change in risk factors since their last session.     Patient reports there has been no change in protective factors since their last session.     A safety and risk management plan has been developed including: Patient consented to co-developed safety plan on 11/24/2020, updated 2/20/23.  Safety and risk management plan was reviewed.   Patient agreed to use safety plan should any safety concerns arise.  A copy was made available to the patient.     Appearance:   Appropriate    Eye Contact:   Good    Psychomotor Behavior: Normal    Attitude:   Cooperative     Orientation:   All   Speech    Rate / Production: Normal/ Responsive    Volume:  Normal    Mood:    Anxious    Affect:    Appropriate    Thought Content:  Clear    Thought Form:  Coherent    Insight:    Good      Medication Review:   No changes to current psychiatric medication(s)     Medication Compliance:   Yes     Changes in Health Issues:   None reported     Chemical Use Review:   Substance Use: Chemical use reviewed, no active concerns identified Nothing used since 2021.     Tobacco Use: No current tobacco use.      Diagnosis:  1. Bipolar 2 disorder (H)    2. Generalized anxiety disorder    3. Trauma and stressor-related disorder        Collateral Reports Completed:   Not Applicable    PLAN: (Patient Tasks / Therapist Tasks / Other)  Contact couples counselor at [phone]   Continue to find ways to find britney.  Use safety plan as needed.  Work on setting up bedroom    In the future:  Look at a budget      There has been demonstrated improvement in functioning while patient has been engaged in psychotherapy/psychological service- if withdrawn the patient would deteriorate and/or relapse.     MICAELA SLADE, [facility]   2023                                                        ______________________________________________________________________    Individual Treatment Plan    Patient's Name: Randi Cleary  YOB: 1955    Date of Creation: 20  Date Treatment Plan Last Reviewed/Revised: 23    DSM5 Diagnoses: 296.89 Bipolar II Disorder Depressed, 300.02 (F41.1) Generalized Anxiety Disorder or Adjustment Disorders  309.89 (F43.8) Other Specified Trauma and Stressor Related Disorder  Psychosocial / Contextual Factors: Patient's entire family of origin has , she now has a sister-in-law and  as support.  Relationship with  is conflictual. She is recovering from surgeries  PROMIS (reviewed every 90 days): PROMIS-10 Scores  PROMIS 10-Global Health (only  "subscores and total score):   PROMIS-10 Scores Only 12/14/2021 3/15/2022 3/15/2022 3/15/2022 3/24/2022 4/1/2022 6/9/2022   Global Mental Health Score 6 6 6 6 8 12 12   Global Physical Health Score 9 9 9 9 8 11 10   PROMIS TOTAL - SUBSCORES 15 15 15 15 16 23 22       Referral / Collaboration:  Referral to another professional/service is not indicated at this time..    Anticipated number of session for this episode of care: 50  Anticipation frequency of session: Biweekly  Anticipated Duration of each session: 53 or more minutes due to intensity of trauma symptoms  Treatment plan will be reviewed in 90 days or when goals have been changed.   There has been demonstrated improvement in functioning while patient has been engaged in psychotherapy/psychological service- if withdrawn the patient would deteriorate and/or relapse.       MeasurableTreatment Goal(s) related to diagnosis / functional impairment(s)  Goal 1: Patient will \"jumpstart, getting going with the things I need to be doing around the house as far as picking up, doing things, trying to do something every day.  Also to lessen the animosity between me and my .\"    I will know I've met my goal when my shoulders are fixed and I can see.      Objective #A (Patient Action)    Patient will  increase frequency of engaging her in ADLs .  Status: Continued - Date(s): 12/10/21, 3/9/22, 6/9/22, 9/2/22, 12/1/22, 3/2/23, 6/6/23    Intervention(s)  Therapist will  engage patient in CBT, specifically behavioral activation .    Objective #B  Patient will   track and record at least 5 pleasant exchanges with . Patient will be able to identify at least 5 positive traits about her  and how he relates to her .  Status: Continued - Date(s): 12/10/21, 3/9/22, 6/9/22, 9/2/22, 12/1/22, 3/2/23, 6/6/23    Intervention(s)  Therapist will  teach assertiveness skills and assign homework related to relationship interactions .    Objective #C  Patient will  reduce " "level of depressive and anxious features as evidenced by reduction in score on her CHAVO-7 and PHQ-9 (scores of 15 and 16 at first measurment, respectively) .  Status: Continued - Date(s): 12/10/21, 3/9/22, 6/9/22, 9/2/22, 12/1/22, 3/2/23, 6/6/23    Intervention(s)  Therapist will  engage patient in person-centered therapy and CBT .    Patient has reviewed and agreed to the above plan.      MICAELA SLADE, [facility]  June 6, 2023                                                   Randi Cleary          SAFETY PLAN:  Step 1: Warning signs / cues (Thoughts, images, mood, situation, behavior) that a crisis may be developing:  Thoughts: \"I don't want to continue\" \"I am unwanted\"  Images: none  Thinking Processes: ruminating  Mood: anger  Behaviors: isolating/withdrawing , can't stop crying, not taking care of myself and not taking care of my responsibilities  Situations: small triggers, such as not being able to find something, or dropping something   Step 2: Coping strategies - Things I can do to take my mind off of my problems without contacting another person (relaxation technique, physical activity):  Distress Tolerance Strategies:  arts and crafts: drawing, play with my pet , listen to positive and upbeat music: any, change body temperature (ice pack/cold water)  and paced breathing/progressive muscle relaxation  Physical Activities: go for a walk, deep breathing and stretching   Focus on helpful thoughts:  \"You've been through this before, you can get through it again.\"  Step 3: People and social settings that provide distraction:                 Name: Carmen                            Name: Darien                           Name: Aleida       pool, shopping, Carmen's house, [employer]       Step 4: Remind myself of people and things that are important to me and worth living for:  Clifford Little Donna, post-COVID world, options of what could be in your future        Step 5: When I am in crisis, I can ask these people to " help me use my safety plan:                 Name: Sidney  Step 6: Making the environment safe:   go to sleep/daydream  Step 7: Professionals or agencies I can contact during a crisis:  [facility] Daytime Number: [phone]  Suicide Prevention Lifeline: 5-295-576-TALK (8255)  Crisis Text Line Service (available 24 hours a day, 7 days a week): Text [place] to 526000  Local Crisis Services: [facility] Crisis: [phone]  Adults can always access to the emPATH unit at [facility] (no phone number, utilize it like an urgent care or ER where you just show up)     Call 911 or go to my nearest emergency department.       I helped develop this safety plan and agree to use it when needed.  I have been given a copy of this plan.       Client signature _________________________________________________________________  Today s date:  11/24/2020  Adapted from Safety Plan Template 2008 Randi Poole and Robby Barba is reprinted with the express permission of the authors.  No portion of the Safety Plan Template may be reproduced without the express, written permission.  You can contact the authors at [email or web address] or [email or web address].

## 2023-08-15 ENCOUNTER — TELEPHONE (OUTPATIENT)
Dept: BEHAVIORAL HEALTH | Facility: CLINIC | Age: 68
End: 2023-08-15
Payer: MEDICARE

## 2023-08-15 ENCOUNTER — TELEPHONE (OUTPATIENT)
Dept: NEUROSURGERY | Facility: CLINIC | Age: 68
End: 2023-08-15

## 2023-08-15 ENCOUNTER — OFFICE VISIT (OUTPATIENT)
Dept: CARDIOLOGY | Facility: CLINIC | Age: 68
End: 2023-08-15
Attending: INTERNAL MEDICINE
Payer: MEDICARE

## 2023-08-15 VITALS
HEART RATE: 110 BPM | OXYGEN SATURATION: 93 % | BODY MASS INDEX: 36.59 KG/M2 | DIASTOLIC BLOOD PRESSURE: 89 MMHG | HEIGHT: 66 IN | SYSTOLIC BLOOD PRESSURE: 158 MMHG | WEIGHT: 227.7 LBS

## 2023-08-15 DIAGNOSIS — I27.20 PULMONARY HTN (H): Primary | ICD-10-CM

## 2023-08-15 DIAGNOSIS — R06.02 SHORTNESS OF BREATH: ICD-10-CM

## 2023-08-15 PROCEDURE — G0463 HOSPITAL OUTPT CLINIC VISIT: HCPCS | Performed by: INTERNAL MEDICINE

## 2023-08-15 PROCEDURE — 99214 OFFICE O/P EST MOD 30 MIN: CPT | Performed by: INTERNAL MEDICINE

## 2023-08-15 ASSESSMENT — ENCOUNTER SYMPTOMS
RECTAL PAIN: 0
ORTHOPNEA: 1
DEPRESSION: 1
SYNCOPE: 0
POLYPHAGIA: 0
PARALYSIS: 0
BOWEL INCONTINENCE: 0
HEADACHES: 1
BLOATING: 0
COUGH DISTURBING SLEEP: 0
LIGHT-HEADEDNESS: 1
INCREASED ENERGY: 1
WEIGHT LOSS: 0
NAUSEA: 1
SHORTNESS OF BREATH: 1
NERVOUS/ANXIOUS: 1
TASTE DISTURBANCE: 0
TREMORS: 0
TROUBLE SWALLOWING: 0
SEIZURES: 0
MEMORY LOSS: 0
HYPOTENSION: 0
CONSTIPATION: 0
POLYDIPSIA: 0
ABDOMINAL PAIN: 1
JAUNDICE: 0
SINUS CONGESTION: 0
DECREASED CONCENTRATION: 1
SINUS PAIN: 0
HEMOPTYSIS: 0
BREAST MASS: 1
HOARSE VOICE: 0
BREAST PAIN: 1
EXERCISE INTOLERANCE: 1
ALTERED TEMPERATURE REGULATION: 1
SWOLLEN GLANDS: 0
POOR WOUND HEALING: 0
CHILLS: 0
POSTURAL DYSPNEA: 1
LOSS OF CONSCIOUSNESS: 0
WHEEZING: 1
WEAKNESS: 1
DISTURBANCES IN COORDINATION: 1
DIARRHEA: 1
SLEEP DISTURBANCES DUE TO BREATHING: 0
DIZZINESS: 1
PALPITATIONS: 1
INSOMNIA: 1
SORE THROAT: 0
NECK PAIN: 1
HALLUCINATIONS: 0
MUSCLE CRAMPS: 0
BLOOD IN STOOL: 0
COUGH: 1
TINGLING: 1
VOMITING: 0
NIGHT SWEATS: 1
JOINT SWELLING: 1
NECK MASS: 0
BACK PAIN: 1
PANIC: 1
STIFFNESS: 1
WEIGHT GAIN: 0
NAIL CHANGES: 0
MUSCLE WEAKNESS: 1
MYALGIAS: 1
SNORES LOUDLY: 1
HYPERTENSION: 1
SMELL DISTURBANCE: 0
FEVER: 0
HEARTBURN: 1
SPUTUM PRODUCTION: 1
SKIN CHANGES: 0
SPEECH CHANGE: 0
FATIGUE: 1
BRUISES/BLEEDS EASILY: 1
LEG PAIN: 1
DYSPNEA ON EXERTION: 1
NUMBNESS: 1
DECREASED APPETITE: 1
ARTHRALGIAS: 1

## 2023-08-15 ASSESSMENT — PAIN SCALES - GENERAL: PAINLEVEL: NO PAIN (0)

## 2023-08-15 NOTE — PROGRESS NOTES
Francisco J Edwards M.D.  Cardiovascular Medicine          Loida Stockton MD    [address] [place] [address]    [phone] (Fax)          Reina Morillo MD    [address] [place], [place] [id number] [phone]     Problem List  1. Possible pulmonary hypertension  2. History of breast cancer              History of mastectomy              Breast reconstruction  3. Atherosclerosis with coronary calcifications  4. Hiatal hernia  5. Hepatic steatosis  6. Diverticulosis  7. KYAW with treatment  8. Psoriasis  9. Grave's disease with ablation  10. Hypertension  11. Asthma  12. Hemochromatosis,gene +  13. Bipolar disorder ?  14. Chronic pain with narcotic dependence  15. Pheochromocytoma (right surgically removed)  16. Elevated body mass index  17. Elevate hemoglobin with macrocytosis  18  Hiatal hernia  19. Chronic pain management  20. Cervical stenosis  21. COVID 19 infection  22. Iron overload  23. 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      Plan:  Dizziness:  balance evaluation and therapy for physical therapy   RTC April  Psychiatry ASAP for bipolar disease follow-up  Continue physical therapy at [facility] [place] [facility]        Patient is vaccinated      Wt Readings from Last 24 Encounters:   07/25/23 104.3 kg (230 lb)   07/14/23 103.9 kg (229 lb)   07/05/23 104 kg (229 lb 3.2 oz)   05/30/23 106.1 kg (234 lb)   04/27/23 104.8 kg (231 lb)   01/18/23 108 kg (238 lb)   01/11/23 108.5 kg (239 lb 4.8 oz)   12/27/22 111.1 kg (245 lb)   11/17/22 110.2 kg (243 lb)   11/16/22 110.2 kg (243 lb)   09/29/22 108.7 kg (239 lb 11.2 oz)   08/31/22 109.4 kg (241 lb 1.6 oz)   07/11/22 114.2 kg (251 lb 11.2 oz)   07/01/22 112.1 kg (247 lb 3.2 oz)   06/24/22 112 kg (247 lb)   06/22/22 113.9 kg (251 lb)   06/07/22 109.6 kg (241 lb 9.6 oz)   05/23/22 116.1 kg (256 lb)   04/06/22 116.1 kg (256 lb)   04/05/22 103.9 kg (229 lb)   02/23/22 103.9 kg (229 lb 1.6 oz)   02/03/22  98 kg (216 lb)   12/21/21 98 kg (216 lb 1.6 oz)   12/13/21 100.7 kg (222 lb)         Laboratories:       Latest Reference Range & Units 01/18/23 13:07 02/25/23 14:32 04/27/23 10:47 05/01/23 06:56 05/01/23 07:42 07/05/23 14:14 07/14/23 11:10 07/25/23 16:43   Sodium 136 - 145 mmol/L 141     142     Potassium 3.4 - 5.3 mmol/L 4.5     4.2     Chloride 98 - 107 mmol/L 103     105     Carbon Dioxide (CO2) 22 - 29 mmol/L 25     22     Urea Nitrogen 8.0 - 23.0 mg/dL 10.8     10.2     Creatinine 0.51 - 0.95 mg/dL 0.64     0.65     GFR Estimate >60 mL/min/1.73m2 >90     >90     Calcium 8.8 - 10.2 mg/dL 9.9     10.0     Anion Gap 7 - 15 mmol/L 13     15     Albumin 3.5 - 5.2 g/dL 4.3     4.5     Protein Total 6.4 - 8.3 g/dL 7.1          Alkaline Phosphatase 35 - 104 U/L 75          ALT 10 - 35 U/L 18          AST 10 - 35 U/L 27          Albumin Urine mg/g Cr        See Comment    Albumin Urine mg/L mg/L       <12.0    Bilirubin Total <=1.2 mg/dL 0.4          Cholesterol <200 mg/dL      240 (H)     Creatinine Urine mg/dL       39.0    Cyclic Citrullinated Peptide Antibody, IgG <7.0 U/mL 1.4          Ferritin 11 - 328 ng/mL 75    64 134     Glucose 70 - 99 mg/dL 105 (H)     90     HDL Cholesterol >=50 mg/dL      77     Hemoglobin A1C 0.0 - 5.6 % 5.6      5.8 (H)    Interleukin 6 Blood <3.01 pg/mL     55.14 (H)      Iron 37 - 145 ug/dL 185 (H)    85   229 (H)   Iron Binding Capacity 240 - 430 ug/dL 310    293   300   Iron Sat Index 15 - 46 % 60 (H)    29   76 (H)   LDL Cholesterol Calculated <=100 mg/dL      138 (H)     Methylmalonic Acid 0.00 - 0.40 umol/L        0.21   Non HDL Cholesterol <130 mg/dL      163 (H)     N-Terminal Pro Bnp 0 - 900 pg/mL 126          Rheumatoid Factor <12 IU/mL <7          T4 Free 0.90 - 1.70 ng/dL 1.46          Triglycerides <150 mg/dL      123     Triiodothyronine (T3) 85 - 202 ng/dL 114          TSH 0.30 - 4.20 uIU/mL 1.58     3.18     Tumor Necrosis Factor Alpha Blood <8.8 pg/mL     8.7       Vitamin B12 232 - 1,245 pg/mL        987   Vitamin D Deficiency screening 20 - 75 ug/L        38   WBC 4.0 - 11.0 10e3/uL 6.4    11.0   7.6   Hemoglobin 11.7 - 15.7 g/dL 17.2 (H)    18.1 (H)   16.6 (H)   Hematocrit 35.0 - 47.0 % 49.1 (H)    53.0 (H)   47.4 (H)   Platelet Count 150 - 450 10e3/uL 234    213   229   RBC Count 3.80 - 5.20 10e6/uL 5.14    5.44 (H)   5.04   MCV 78 - 100 fL 96    97   94   MCH 26.5 - 33.0 pg 33.5 (H)    33.3 (H)   32.9   MCHC 31.5 - 36.5 g/dL 35.0    34.2   35.0   RDW 10.0 - 15.0 % 12.0    12.5   12.3   % Neutrophils % 75    82   74   % Lymphocytes % 16    11   18   % Monocytes % 7    4   6   % Eosinophils % 1    1   1   % Basophils % 1    1   0   Absolute Basophils 0.0 - 0.2 10e3/uL 0.0    0.1   0.0   Absolute Eosinophils 0.0 - 0.7 10e3/uL 0.1    0.1   0.1   Absolute Immature Granulocytes <=0.4 10e3/uL 0.0    0.1   0.1   Absolute Lymphocytes 0.8 - 5.3 10e3/uL 1.1    1.2   1.3   Absolute Monocytes 0.0 - 1.3 10e3/uL 0.5    0.5   0.4   % Immature Granulocytes % 0    1   1   Absolute Neutrophils 1.6 - 8.3 10e3/uL 4.8    9.1 (H)   5.7   Absolute NRBCs 10e3/uL     0.0   0.0   NRBCs per 100 WBC <1 /100     0   0   % Retic 0.5 - 2.0 % 2.1    1.5      Absolute Retic 0.025 - 0.095 10e6/uL 0.106    0.084      ANTI NUCLEAR QUIRINO IGG BY IFA WITH REFLEX      Rpt      XR EOS TOTAL BODY    Rpt        MR CERVICAL SPINE W/O CONTRAST     Rpt       MR ADRENAL W/O CONTRAST   Rpt         SALVADOR interpretation Negative      Negative      (H): Data is abnormally high  Rpt: View report in Results Review for more information  Meds  Current Outpatient Medications   Medication    albuterol (PROVENTIL) (2.5 MG/3ML) 0.083% neb solution    Cholecalciferol (VITAMIN D3) 250 MCG (46965 UT) TABS    Cyanocobalamin (VITAMIN B-12) 5000 MCG SUBL    Fluticasone-Umeclidin-Vilanterol (TRELEGY ELLIPTA) 100-62.5-25 MCG/INH oral inhaler    furosemide (LASIX) 20 MG tablet    losartan (COZAAR) 25 MG tablet    magnesium 250 MG tablet     medical cannabis (Patient's own supply)    methocarbamol (ROBAXIN) 500 MG tablet    Multiple Vitamins-Iron (MULTIVITAMIN PLUS IRON ADULT) TABS    omeprazole (PRILOSEC) 20 MG DR capsule    potassium chloride ER (KLOR-CON M) 20 MEQ CR tablet    PREBIOTIC PRODUCT PO    rOPINIRole (REQUIP) 2 MG tablet    SYNTHROID 150 MCG tablet    Turmeric Curcumin 500 MG CAPS     No current facility-administered medications for this visit.       Labs      Imaging     Right Heart Pressures     2019    RA  A-wave: 16 mmHg  V-wave: 16 mmHg  Mean: 15 mmHg   HR: 88 bpm  RV  Systolic: 44 mmHg  End Diastolic: 16 mmHg  HR: 88 bpm    PA  Systolic:41 mmHg  Diasotlic: 24 mmHg  Mean: 31 mmHg  HR: 88 bpm  PA Sat: 70.8%    PCW  A-wave: 18 mmHg  V-wave: 17 mmHg  Mean: 16 mmHg  HR: 91 bpm    Cardiac Output  CO Jeannie: 5.72 L/min  CI Jeannie: 2.56 L/min/m2  CO TD: Not performed  CI TD: Not performed     Vitals  BP: 168 mmHg / 74 mmHg  SpO2: 92 %  PA Stat: 70.8 %         Echocardiogram        Name: ERIK BOSS  MRN: [id number]  : 1955  Study Date: 2019 03:04 PM  Age: 64 yrs  Gender: Female  Patient Location: [facility]  Reason For Study: Pulmonary hypertension (H)  Ordering Physician: MINO CORTES  Referring Physician: MINO CORTES  Performed By: Siobhan Dee ASHTYN     BSA: 2.2 m2  Height: 66 in  Weight: 259 lb  BP: 118/76 mmHg  _____________________________________________________________________________  __        Procedure  Echocardiogram with two-dimensional, color and spectral Doppler performed.  Poor acoustic windows. Optison (NDC #7396-1635-52) given intravenously.  Patient was given 3.0 ml mixture of 3 ml Optison and 6 ml saline. 6.0 ml  wasted. IV start location R Hand .  _____________________________________________________________________________  __        Interpretation Summary  1. Global and regional left ventricular function is normal with an EF of 55-  60%.  2. The right ventricle is normal size. Global  right ventricular function is  mildly reduced.  3. No significant valvular disease.  4. Unable to assess pulmonary artery pressure.  5. IVC diameter <2.1 cm collapsing >50% with sniff suggests a normal RA  pressure of 3 mmHg.          Right sided filling pressures are moderately elevated.  Moderately elevated pulmonary artery hypertension.  Left sided filling pressures are mildly elevated.  Normal cardiac output level.     1. Normal coronary arteries.          Plan     Follow bedrest per protocol   Continued medical management and lifestyle modifications for cardiovascular risk factor optimizations.   Follow up with Dr. Francisco J Edwards.   Discharge today per protocol   Coronary Findings    Diagnostic  Dominance: Right  Left Anterior Descending   First Diagonal Branch   The vessel is moderate in size.     Intervention    No interventions have been documented.  Hemodynamics    Hemodynamics Phase: Baseline    RA   A-wave: 16 mmHg   V-wave: 16 mmHg   Mean: 15 mmHg    HR: 88 bpm  RV   Systolic: 44 mmHg   End Diastolic: 16 mmHg   HR: 88 bpm    PA   Systolic:41 mmHg   Diasotlic: 24 mmHg   Mean: 31 mmHg   HR: 88 bpm   PA Sat: 70.8%    PCW   A-wave: 18 mmHg   V-wave: 17 mmHg   Mean: 16 mmHg   HR: 91 bpm    Cardiac Output   CO Jeannie: 5.72 L/min   CI Jeannie: 2.56 L/min/m2   CO TD:  Not performed   CI TD:  Not performed      Vitals   BP: 168 mmHg / 74 mmHg   SpO2: 92 %   PA Stat: 70.8 %    Resistance Metric   PVR index: 5.86 QUIROZ/m2                      CC: physicians above and    Dusty Dubois M.D.  [facility] submitted by the patient for this visit:  Symptoms you have experienced in the last 30 days (Submitted on 8/15/2023)  General Symptoms: Yes  Skin Symptoms: Yes  HENT Symptoms: Yes  EYE SYMPTOMS: No  HEART SYMPTOMS: Yes  LUNG SYMPTOMS: Yes  INTESTINAL SYMPTOMS: Yes  URINARY SYMPTOMS: No  GYNECOLOGIC SYMPTOMS: No  BREAST SYMPTOMS: Yes  SKELETAL SYMPTOMS: Yes  BLOOD SYMPTOMS:  Yes  NERVOUS SYSTEM SYMPTOMS: Yes  MENTAL HEALTH SYMPTOMS: Yes  Please answer the questions below to tell us what conditions you are experiencing: (Submitted on 8/15/2023)  Ear pain: Yes  Ear discharge: No  Hearing loss: No  Tinnitus: Yes  Nosebleeds: No  Congestion: No  Sinus pain: No  Trouble swallowing: No   Voice hoarseness: No  Mouth sores: No  Sore throat: No  Tooth pain: No  Gum tenderness: No  Bleeding gums: No  Change in taste: No  Change in sense of smell: No  Dry mouth: No  Hearing aid used: No  Neck lump: No  Please answer the questions below to tell us what conditions you are experiencing: (Submitted on 8/15/2023)  Fever: No  Loss of appetite: Yes  Weight loss: No  Weight gain: No  Fatigue: Yes  Night sweats: Yes  Chills: No  Increased stress: Yes  Excessive hunger: No  Excessive thirst: No  Feeling hot or cold when others believe the temperature is normal: Yes  Loss of height: No  Post-operative complications: Yes  Surgical site pain: Yes  Hallucinations: No  Change in or Loss of Energy: Yes  Hyperactivity: No  Confusion: No   (Submitted on 8/15/2023)  Changes in hair: No  Changes in moles/birth marks: No  Itching: No  Rashes: Yes  Changes in nails: No  Acne: No  Hair in places you don't want it: No  Change in facial hair: No  Warts: No  Non-healing sores: No  Scarring: No  Flaking of skin: No  Color changes of hands/feet in cold : No  Sun sensitivity: No  Skin thickening: Yes  Please answer the questions below to tell us what condition you are experiencing: (Submitted on 8/15/2023)  Chest pain or pressure: No  Fast or irregular heartbeat: Yes  Pain in legs with walking: Yes  Trouble breathing while lying down: Yes  Fingers or toes appear blue: No  High blood pressure: Yes  Low blood pressure: No  Fainting: No  Murmurs: No  Pacemaker: No  Varicose veins: No  Wake up at night with shortness of breath: No  Light-headedness: Yes  Exercise intolerance: Yes  Please answer the questions below to tell us what  condition you are experiencing: (Submitted on 8/15/2023)  Cough: Yes  Sputum or phlegm: Yes  Coughing up blood: No  Difficulty breating or shortness of breath: Yes  Snoring: Yes  Wheezing: Yes  Difficulty breathing on exertion: Yes  Nighttime Cough: No  Difficulty breathing when lying flat: Yes  Please answer the questions below to tell us what conditions you are experiencing: (Submitted on 8/15/2023)  Heart burn or indigestion: Yes  Nausea: Yes  Vomiting: No  Abdominal pain: Yes  Bloating: No  Constipation: No  Diarrhea: Yes  Blood in stool: No  Black stools: No  Rectal or Anal pain: No  Fecal incontinence: No  Yellowing of skin or eyes: No  Vomit with blood: No  Change in stools: Yes  Please answer the questions below to tell us what condition you are experiencing: (Submitted on 8/15/2023)  Discharge: No  Lumps: Yes  Pain: Yes  Nipple retraction: No  Please answer the questions below to tell us what condition you are experiencing: (Submitted on 8/15/2023)  Back pain: Yes  Muscle aches: Yes  Neck pain: Yes  Swollen joints: Yes  Joint pain: Yes  Bone pain: No  Muscle cramps: No  Muscle weakness: Yes  Joint stiffness: Yes  Please answer the questions below to tell us what condition you are experiencing: (Submitted on 8/15/2023)  Anemia: No  Swollen glands: No  Easy bleeding or bruising: Yes  Please answer the questions below to tell us what condition you are experiencing: (Submitted on 8/15/2023)  Trouble with coordination: Yes  Dizziness or trouble with balance: Yes  Fainting or black-out spells: No  Memory loss: No  Headache: Yes  Seizures: No  Speech problems: No  Tingling: Yes  Tremor: No  Weakness: Yes  Difficulty walking: Yes  Paralysis: No  Numbness: Yes  Please answer the questions below to tell us what condition you are experiencing: (Submitted on 8/15/2023)  Nervous or Anxious: Yes  Depression: Yes  Trouble sleeping: Yes  Trouble thinking or concentrating: Yes  Mood changes: Yes  Panic attacks:  Yes    Conflicting answers have been found for some questions. Please document the patient's answers manually.

## 2023-08-15 NOTE — LETTER
8/15/2023      RE: Randi Cleary  [address] [facility] [address]       Dear Colleague,    Thank you for the opportunity to participate in the care of your patient, Randi Cleary, at the [facility] [place] [facility]. Please see a copy of my visit note below.    Francisco J Edwards M.D.  Cardiovascular Medicine          Loida Stockton MD    [address] [place] [address]    [phone] (Fax)          Reina Morillo MD    [address] [place] [address] [phone]     Problem List  1. Possible pulmonary hypertension  2. History of breast cancer              History of mastectomy              Breast reconstruction  3. Atherosclerosis with coronary calcifications  4. Hiatal hernia  5. Hepatic steatosis  6. Diverticulosis  7. KYAW with treatment  8. Psoriasis  9. Grave's disease with ablation  10. Hypertension  11. Asthma  12. Hemochromatosis,gene +  13. Bipolar disorder ?  14. Chronic pain with narcotic dependence  15. Pheochromocytoma (right surgically removed)  16. Elevated body mass index  17. Elevate hemoglobin with macrocytosis  18  Hiatal hernia  19. Chronic pain management  20. Cervical stenosis  21. COVID 19 infection  22. Iron overload  23. 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      Plan:  Dizziness:  balance evaluation and therapy for physical therapy   RTC April  Psychiatry ASAP for bipolar disease follow-up  [place] [facility]        [place]      Wt Readings from Last 24 Encounters:   07/25/23 104.3 kg (230 lb)   07/14/23 103.9 kg (229 lb)   07/05/23 104 kg (229 lb 3.2 oz)   05/30/23 106.1 kg (234 lb)   04/27/23 104.8 kg (231 lb)   01/18/23 108 kg (238 lb)   01/11/23 108.5 kg (239 lb 4.8 oz)   12/27/22 111.1 kg (245 lb)   11/17/22 110.2 kg (243 lb)   11/16/22 110.2 kg (243 lb)   09/29/22 108.7  kg (239 lb 11.2 oz)   08/31/22 109.4 kg (241 lb 1.6 oz)   07/11/22 114.2 kg (251 lb 11.2 oz)   07/01/22 112.1 kg (247 lb 3.2 oz)   06/24/22 112 kg (247 lb)   06/22/22 113.9 kg (251 lb)   06/07/22 109.6 kg (241 lb 9.6 oz)   05/23/22 116.1 kg (256 lb)   04/06/22 116.1 kg (256 lb)   04/05/22 103.9 kg (229 lb)   02/23/22 103.9 kg (229 lb 1.6 oz)   02/03/22 98 kg (216 lb)   12/21/21 98 kg (216 lb 1.6 oz)   12/13/21 100.7 kg (222 lb)         Laboratories:       Latest Reference Range & Units 01/18/23 13:07 02/25/23 14:32 04/27/23 10:47 05/01/23 06:56 05/01/23 07:42 07/05/23 14:14 07/14/23 11:10 07/25/23 16:43   Sodium 136 - 145 mmol/L 141     142     Potassium 3.4 - 5.3 mmol/L 4.5     4.2     Chloride 98 - 107 mmol/L 103     105     Carbon Dioxide (CO2) 22 - 29 mmol/L 25     22     Urea Nitrogen 8.0 - 23.0 mg/dL 10.8     10.2     Creatinine 0.51 - 0.95 mg/dL 0.64     0.65     GFR Estimate >60 mL/min/1.73m2 >90     >90     Calcium 8.8 - 10.2 mg/dL 9.9     10.0     Anion Gap 7 - 15 mmol/L 13     15     Albumin 3.5 - 5.2 g/dL 4.3     4.5     Protein Total 6.4 - 8.3 g/dL 7.1          Alkaline Phosphatase 35 - 104 U/L 75          ALT 10 - 35 U/L 18          AST 10 - 35 U/L 27          Albumin Urine mg/g Cr        See Comment    Albumin Urine mg/L mg/L       <12.0    Bilirubin Total <=1.2 mg/dL 0.4          Cholesterol <200 mg/dL      240 (H)     Creatinine Urine mg/dL       39.0    Cyclic Citrullinated Peptide Antibody, IgG <7.0 U/mL 1.4          Ferritin 11 - 328 ng/mL 75    64 134     Glucose 70 - 99 mg/dL 105 (H)     90     HDL Cholesterol >=50 mg/dL      77     Hemoglobin A1C 0.0 - 5.6 % 5.6      5.8 (H)    Interleukin 6 Blood <3.01 pg/mL     55.14 (H)      Iron 37 - 145 ug/dL 185 (H)    85   229 (H)   Iron Binding Capacity 240 - 430 ug/dL 310    293   300   Iron Sat Index 15 - 46 % 60 (H)    29   76 (H)   LDL Cholesterol Calculated <=100 mg/dL      138 (H)     Methylmalonic Acid 0.00 - 0.40 umol/L        0.21   Non HDL  Cholesterol <130 mg/dL      163 (H)     N-Terminal Pro Bnp 0 - 900 pg/mL 126          Rheumatoid Factor <12 IU/mL <7          T4 Free 0.90 - 1.70 ng/dL 1.46          Triglycerides <150 mg/dL      123     Triiodothyronine (T3) 85 - 202 ng/dL 114          TSH 0.30 - 4.20 uIU/mL 1.58     3.18     Tumor Necrosis Factor Alpha Blood <8.8 pg/mL     8.7      Vitamin B12 232 - 1,245 pg/mL        987   Vitamin D Deficiency screening 20 - 75 ug/L        38   WBC 4.0 - 11.0 10e3/uL 6.4    11.0   7.6   Hemoglobin 11.7 - 15.7 g/dL 17.2 (H)    18.1 (H)   16.6 (H)   Hematocrit 35.0 - 47.0 % 49.1 (H)    53.0 (H)   47.4 (H)   Platelet Count 150 - 450 10e3/uL 234    213   229   RBC Count 3.80 - 5.20 10e6/uL 5.14    5.44 (H)   5.04   MCV 78 - 100 fL 96    97   94   MCH 26.5 - 33.0 pg 33.5 (H)    33.3 (H)   32.9   MCHC 31.5 - 36.5 g/dL 35.0    34.2   35.0   RDW 10.0 - 15.0 % 12.0    12.5   12.3   % Neutrophils % 75    82   74   % Lymphocytes % 16    11   18   % Monocytes % 7    4   6   % Eosinophils % 1    1   1   % Basophils % 1    1   0   Absolute Basophils 0.0 - 0.2 10e3/uL 0.0    0.1   0.0   Absolute Eosinophils 0.0 - 0.7 10e3/uL 0.1    0.1   0.1   Absolute Immature Granulocytes <=0.4 10e3/uL 0.0    0.1   0.1   Absolute Lymphocytes 0.8 - 5.3 10e3/uL 1.1    1.2   1.3   Absolute Monocytes 0.0 - 1.3 10e3/uL 0.5    0.5   0.4   % Immature Granulocytes % 0    1   1   Absolute Neutrophils 1.6 - 8.3 10e3/uL 4.8    9.1 (H)   5.7   Absolute NRBCs 10e3/uL     0.0   0.0   NRBCs per 100 WBC <1 /100     0   0   % Retic 0.5 - 2.0 % 2.1    1.5      Absolute Retic 0.025 - 0.095 10e6/uL 0.106    0.084      ANTI NUCLEAR QUIRINO IGG BY IFA WITH REFLEX      Rpt      XR EOS TOTAL BODY    Rpt        MR CERVICAL SPINE W/O CONTRAST     Rpt       MR ADRENAL W/O CONTRAST   Rpt         SALVADOR interpretation Negative      Negative      (H): Data is abnormally high  Rpt: View report in Results Review for more information  Meds  Current Outpatient Medications    Medication    albuterol (PROVENTIL) (2.5 MG/3ML) 0.083% neb solution    Cholecalciferol (VITAMIN D3) 250 MCG (61613 UT) TABS    Cyanocobalamin (VITAMIN B-12) 5000 MCG SUBL    Fluticasone-Umeclidin-Vilanterol (TRELEGY ELLIPTA) 100-62.5-25 MCG/INH oral inhaler    furosemide (LASIX) 20 MG tablet    losartan (COZAAR) 25 MG tablet    magnesium 250 MG tablet    medical cannabis (Patient's own supply)    methocarbamol (ROBAXIN) 500 MG tablet    Multiple Vitamins-Iron (MULTIVITAMIN PLUS IRON ADULT) TABS    omeprazole (PRILOSEC) 20 MG DR capsule    potassium chloride ER (KLOR-CON M) 20 MEQ CR tablet    PREBIOTIC PRODUCT PO    rOPINIRole (REQUIP) 2 MG tablet    SYNTHROID 150 MCG tablet    Turmeric Curcumin 500 MG CAPS     No current facility-administered medications for this visit.       Labs      Imaging     Right Heart Pressures     2019    RA  A-wave: 16 mmHg  V-wave: 16 mmHg  Mean: 15 mmHg   HR: 88 bpm  RV  Systolic: 44 mmHg  End Diastolic: 16 mmHg  HR: 88 bpm    PA  Systolic:41 mmHg  Diasotlic: 24 mmHg  Mean: 31 mmHg  HR: 88 bpm  PA Sat: 70.8%    PCW  A-wave: 18 mmHg  V-wave: 17 mmHg  Mean: 16 mmHg  HR: 91 bpm    Cardiac Output  CO Jeannie: 5.72 L/min  CI Jeannie: 2.56 L/min/m2  CO TD: Not performed  CI TD: Not performed     Vitals  BP: 168 mmHg / 74 mmHg  SpO2: 92 %  PA Stat: 70.8 %         Echocardiogram        Name: ERIK BOSS  MRN: [id number]  : 1955  Study Date: 2019 03:04 PM  Age: 64 yrs  Gender: Female  Patient Location: [facility]  Reason For Study: Pulmonary hypertension (H)  Ordering Physician: MINO CORTES  Referring Physician: MINO CORTES  Performed By: Siobhan Dee RDCS     BSA: 2.2 m2  Height: 66 in  Weight: 259 lb  BP: 118/76 mmHg  _____________________________________________________________________________  __        Procedure  Echocardiogram with two-dimensional, color and spectral Doppler performed.  Poor acoustic windows. Optison (NDC #0006-4001-70) given  intravenously.  Patient was given 3.0 ml mixture of 3 ml Optison and 6 ml saline. 6.0 ml  wasted. IV start location R Hand .  _____________________________________________________________________________  __        Interpretation Summary  1. Global and regional left ventricular function is normal with an EF of 55-  60%.  2. The right ventricle is normal size. Global right ventricular function is  mildly reduced.  3. No significant valvular disease.  4. Unable to assess pulmonary artery pressure.  5. IVC diameter <2.1 cm collapsing >50% with sniff suggests a normal RA  pressure of 3 mmHg.          Right sided filling pressures are moderately elevated.  Moderately elevated pulmonary artery hypertension.  Left sided filling pressures are mildly elevated.  Normal cardiac output level.     1. Normal coronary arteries.          Plan     Follow bedrest per protocol   Continued medical management and lifestyle modifications for cardiovascular risk factor optimizations.   Follow up with Dr. Francisco J Edwards.   Discharge today per protocol   Coronary Findings    Diagnostic  Dominance: Right  Left Anterior Descending   First Diagonal Branch   The vessel is moderate in size.     Intervention    No interventions have been documented.  Hemodynamics    Hemodynamics Phase: Baseline    RA   A-wave: 16 mmHg   V-wave: 16 mmHg   Mean: 15 mmHg    HR: 88 bpm  RV   Systolic: 44 mmHg   End Diastolic: 16 mmHg   HR: 88 bpm    PA   Systolic:41 mmHg   Diasotlic: 24 mmHg   Mean: 31 mmHg   HR: 88 bpm   PA Sat: 70.8%    PCW   A-wave: 18 mmHg   V-wave: 17 mmHg   Mean: 16 mmHg   HR: 91 bpm    Cardiac Output   CO Jeannie: 5.72 L/min   CI Jeannie: 2.56 L/min/m2   CO TD:  Not performed   CI TD:  Not performed      Vitals   BP: 168 mmHg / 74 mmHg   SpO2: 92 %   PA Stat: 70.8 %    Resistance Metric   PVR index: 5.86 QUIROZ/m2         Please do not hesitate to contact me if you have any questions/concerns.     Sincerely,     Francisco J Edwards MD        CC:  physicians above and    Dusty Dubois M.D.  ([facility]

## 2023-08-15 NOTE — NURSING NOTE
"Plan as patient understands:  Pt left prior to going over  the plan and skipped the pods.    ========================  Reviewed Med list  Completed AVS  Follow-up orders placed  Marked chart \"Ready for Checkout\"    Patient verbalized understanding, agreed with plan and denied any further questions. Miki Richardson RN on 8/15/2023 at 10:40 AM    "

## 2023-08-15 NOTE — PATIENT INSTRUCTIONS
Medication Changes:  No changes at this time    Patient Instructions:  1. Continue staying active and eat a heart healthy diet.    2. Please keep current list of medications with you at all times.    3. Remember to weigh yourself daily after voiding and before you consume any food or beverages and log the numbers.  If you have gained 2 pounds overnight or 5 pounds in a week contact us immediately for medication adjustments or further instructions.    4. **Please call us immediately if you have any syncope (fainting or passing out), chest pain, edema (swelling or weight gain), or decline in your functional status (general decline in how you are feeling).    5. Patients on Remodulin (treprostinil) or Veletri (epoprostenol): Please make sure that you have your backup pump and supplies with you at all times, your mixing instructions, and contact information for your specialty pharmacy.    Follow up Appointment Information:  Bre Curry Referral for balance and hand therapy  Echocardiogram at [facility] in the next month  Follow-up with Dr Edwards in April with labs prior      We are located on the third floor of the [facility] on the [facility].  Our address is     [address] [place] [address] on [address] [place] [address]    Thank you for allowing us to be a part of your care here at the [facility]    If you have questions or concerns please contact us at:    Miki Richardson, CHRISTINA    Nurse Coordinator       Pulmonary Hypertension     [facility] [place] [facility]    (Phone)[phone]       JON Caraballo   (Prior Authorizations)    ()  Clinic   Clinic   Pulmonary Hypertension   Pulmonary Hypertension  [facility]  [facility]  (P)[phone]    (P) [phone]  (F) [phone]              ** Please note  that you will NOT receive a reminder call regarding your scheduled testing, reminder calls are for provider appointments only.  If you are scheduled for testing within the [facility] system you may receive a call regarding pre-registration for billing purposes only.**     Remember to weigh yourself daily after voiding and before you consume any food or beverages and log the numbers.  If you have gained/lost 2 pounds overnight or 5 pounds in a week contact us immediately for medication adjustments or further instructions.   **Please call us immediately if you have any syncope, chest pain, edema, or decline in your functional status.    Support Group:  Pulmonary Hypertension Association  Https://www.phassociation.org/  **Look at the Events Tab** They even have Support Groups that you can call into    [facility] PH Support Group  Second Saturday of the Month from 1-3 PM   Location: [address] [place] [address]  Leader: Corry Reeves   Phone: [phone]  Email: [email or web address]     Great Videos about Pulmonary Hypertension!!  Scan ME!    Website: [email or web address]

## 2023-08-15 NOTE — TELEPHONE ENCOUNTER
[facility] Call Center    Phone Message    May a detailed message be left on voicemail: yes     Reason for Call: Other: Dr Edwards from Cardiology called on behalf patient, and states patient has not been given results for most recent CT or MRI that she had completed months ago. He states patient will need a call back with recommendations and follow up, and she will be available this afternoon for a call.      Action Taken: Message routed to:  [facility] Neurosurgery    Travel Screening: Not Applicable

## 2023-08-16 ENCOUNTER — VIRTUAL VISIT (OUTPATIENT)
Dept: PSYCHOLOGY | Facility: CLINIC | Age: 68
End: 2023-08-16
Payer: MEDICARE

## 2023-08-16 ENCOUNTER — HOSPITAL ENCOUNTER (OUTPATIENT)
Dept: BEHAVIORAL HEALTH | Facility: CLINIC | Age: 68
Discharge: HOME | End: 2023-08-16
Attending: PSYCHIATRY & NEUROLOGY
Payer: MEDICARE

## 2023-08-16 DIAGNOSIS — F41.1 GENERALIZED ANXIETY DISORDER: ICD-10-CM

## 2023-08-16 DIAGNOSIS — F31.81 BIPOLAR 2 DISORDER (H): Primary | ICD-10-CM

## 2023-08-16 DIAGNOSIS — F43.9 TRAUMA AND STRESSOR-RELATED DISORDER: ICD-10-CM

## 2023-08-16 PROCEDURE — 90853 GROUP PSYCHOTHERAPY: CPT | Performed by: SOCIAL WORKER

## 2023-08-16 PROCEDURE — 90834 PSYTX W PT 45 MINUTES: CPT | Mod: VID | Performed by: SOCIAL WORKER

## 2023-08-16 NOTE — GROUP NOTE
Process Group Note    PATIENT'S NAME: Randi Cleary  MRN:   [id number]  :   1955  ACCT. NUMBER: [id number]  DATE OF SERVICE: 23  START TIME:  1:00 PM  END TIME:  1:50 PM  FACILITATOR: Pamela Cabrera [facility]  TOPIC:  Process Group    Diagnoses:  296.33 (F33.2) Major Depressive Disorder, Recurrent Episode, Severe With anxious distress  300.02 (F41.1) Generalized Anxiety Disorder    Rule out:  296.89 Bipolar II Disorder vs. 314.01 (F90.2) Attention-Deficit/Hyperactivity Disorder   Combined presentation       [facility] Mental Health Day Treatment  TRACK: [facility]    NUMBER OF PARTICIPANTS: 6        Data:    Session content: At the start of this group, patients were invited to check in by identifying themselves, describing their current emotional status, and identifying issues to address in this group.   Area(s) of treatment focus addressed in this session included Symptom Management, Personal Safety, and Community Resources/Discharge Planning.  Winnie was welcomed and oriented to groups.  She shared that she is unclear about her goal for today.   She is frustrated that she was recently sick and missed her psychiatry intake appointment.  She reported frustration at how to find the psychiatry record for thirty years from the private practice of her former psychiatrist.  Apparently the provider lost their license.  She shared that she had Serotonin Syndrome and the records would be helpful for future medication choices.  She reported feeling like a victim.  She shared that her cardiologist was wondering if her mental health symptoms were stable enough to do a cardiac procedure.  She shared that she has an individual therapist whom she sees every other week.    Therapeutic Interventions/Treatment Strategies:  Psychotherapist offered support, feedback and validation and reinforced use of skills. Treatment modalities used include Cognitive Behavioral Therapy. Interventions include Coping  "Skills: Discussed how the use of intentional \"in the moment\" actions can help reduce distress.    Assessment:    Patient response:   Patient responded to session by being attentive    Possible barriers to participation / learning include: and no barriers identified    Health Issues:   None reported       Substance Use Review:   Substance Use: No active concerns identified.    Mental Status/Behavioral Observations  Appearance:   Appropriate   Eye Contact:   Good   Psychomotor Behavior: Normal   Attitude:   Cooperative  Friendly  Orientation:   All  Speech   Rate / Production: Normal    Volume:  Normal   Mood:    Anxious  Depressed   Affect:    Appropriate   Thought Content:   Clear  Thought Form:  Coherent  Logical     Insight:    Good     Plan:   Safety Plan: No current safety concerns identified.  Recommended that patient call 911 or go to the local ED should there be a change in any of these risk factors.   Barriers to treatment: None identified  Patient Contracts (see media tab):  None  Substance Use: Not addressed in session   Continue or Discharge: Patient will continue in Adult Day Treatment (ADT)  as planned. Patient is likely to benefit from learning and using skills as they work toward the goals identified in their treatment plan.      Pamela Cabrera, [facility]  August 16, 2023  "

## 2023-08-16 NOTE — PROGRESS NOTES
[facility]                                     Progress Note    Patient Name: Randi Cleary  Date: 8/16/23         Service Type: Individual     Session Start Time: 11:03 AM Session End Time: 11:52 AM     Session Length: 49 minutes    Session #: 195    Attendees: Client attended alone    Service Modality:  Video Visit:      Provider verified identity through the following two step process.  Patient provided:  Patient is known previously to provider    Telemedicine Visit: The patient's condition can be safely assessed and treated via synchronous audio and visual telemedicine encounter.      Reason for Telemedicine Visit: Patient convenience (e.g. access to timely appointments / distance to available provider)    Originating Site (Patient Location):  Yellow parking ramp at [place]    Distant Site (Provider Location): Provider Remote Setting- Home Office    Consent:  The patient/guardian has verbally consented to: the potential risks and benefits of telemedicine (video visit) versus in person care; bill my insurance or make self-payment for services provided; and responsibility for payment of non-covered services.     Patient would like the video invitation sent by:  My Chart    Mode of Communication:  Video Conference via [facility]    Distant Location (Provider):  Off-site    As the provider I attest to compliance with applicable laws and regulations related to telemedicine.      DATA  Extended Session (53+ minutes): No  Interactive Complexity: No  Crisis: No        Progress Since Last Session (Related to Symptoms / Goals / Homework):   Symptoms:  Patient has more hope of things going well.      Homework:  Progress made  Contact couples counselor at [phone]   Continue to find ways to find britney.  Use safety plan as needed.  Work on setting up bedroom    In the future:  Look at a budget       Episode of Care Goals: Satisfactory progress - ACTION (Actively working towards change); Intervened  by reinforcing change plan / affirming steps taken     Current / Ongoing Stressors and Concerns:   Patient is currently socially isolated. She has a conflictual relationship with her .  She is getting minimal physical activity.  She has had several surgeries.      Treatment Objective(s) Addressed in This Session:   Patient will increase frequency of engaging her in ADLs.  Patient will track and record at least 5 pleasant exchanges with . Patient will be able to identify at least 5 positive traits about her .  Patient will reduce level of depressive and anxious features as evidenced by reduction in score on her CHAVO-7 and PHQ-9 (scores of 15 and 16 at first measurment, respectively).     Intervention:   Supportive Therapy:   Talked about extended group and how this helps with her mental health stability. Looked at her multiple medical concerns and how she's navigating them. Talked about how this is impacting her mental health. Provided coaching and verbal support on her prioritizing her care.     The following assessments were completed by patient for this visit:  PHQ9:       6/29/2023    10:02 AM 7/5/2023     9:57 AM 7/21/2023    10:32 AM 8/1/2023     1:08 PM 8/7/2023    12:59 PM 8/9/2023     8:30 AM 8/16/2023     9:45 AM   PHQ-9 SCORE   PHQ-9 Total Score MyChart 19 (Moderately severe depression) 18 (Moderately severe depression) 18 (Moderately severe depression) 19 (Moderately severe depression) 21 (Severe depression) 19 (Moderately severe depression) 17 (Moderately severe depression)   PHQ-9 Total Score 19    19 18    18 18 19 21 19 17     GAD7:       5/5/2023     8:07 AM 5/22/2023    10:12 AM 6/6/2023    10:44 AM 6/22/2023     9:58 AM 7/6/2023     9:11 AM 7/21/2023    10:34 AM 8/7/2023     1:03 PM   CHAVO-7 SCORE   Total Score 11 (moderate anxiety) 10 (moderate anxiety) 12 (moderate anxiety) 17 (severe anxiety) 15 (severe anxiety) 18 (severe anxiety) 18 (severe anxiety)   Total Score 11 10 12 17     17 15    15 18 18    18     PROMIS 10-Global Health (only subscores and total score):       5/5/2023     8:12 AM 6/8/2023    12:05 PM 6/22/2023    10:03 AM 7/6/2023     9:12 AM 7/21/2023    10:36 AM 7/28/2023     3:24 PM 8/7/2023     1:07 PM   PROMIS-10 Scores Only   Global Mental Health Score 6 6    6 5    5 5    5 5    5  6    6   Global Physical Health Score 9 9    9 7    7 8    8 7    7  8    8   PROMIS TOTAL - SUBSCORES 15 15    15 12    12 13    13 12    12  14    14       Information is confidential and restricted. Go to Review Flowsheets to unlock data.    Multiple values from one day are sorted in reverse-chronological order        ASSESSMENT: Current Emotional / Mental Status (status of significant symptoms):   Risk status (Self / Other harm or suicidal ideation)   Patient denies current fears or concerns for personal safety.   Patient denies current or recent suicidal ideation or behaviors.   Patient denies current or recent homicidal ideation or behaviors.   Patient denies current or recent self injurious behavior or ideation.   Patient denies other safety concerns.   Patient reports there has been no change in risk factors since their last session.     Patient reports there has been no change in protective factors since their last session.     A safety and risk management plan has been developed including: Patient consented to co-developed safety plan on 11/24/2020, updated 2/20/23.  Safety and risk management plan was reviewed.   Patient agreed to use safety plan should any safety concerns arise.  A copy was made available to the patient.     Appearance:   Appropriate    Eye Contact:   Good    Psychomotor Behavior: Normal    Attitude:   Cooperative    Orientation:   All   Speech    Rate / Production: Normal/ Responsive    Volume:  Normal    Mood:    Anxious    Affect:    Appropriate    Thought Content:  Clear    Thought Form:  Coherent    Insight:    Good      Medication Review:   No changes to  current psychiatric medication(s)     Medication Compliance:   Yes     Changes in Health Issues:   None reported     Chemical Use Review:   Substance Use: Chemical use reviewed, no active concerns identified Nothing used since 2021.     Tobacco Use: No current tobacco use.      Diagnosis:  1. Bipolar 2 disorder (H)    2. Generalized anxiety disorder    3. Trauma and stressor-related disorder      Collateral Reports Completed:   Not Applicable    PLAN: (Patient Tasks / Therapist Tasks / Other)  Ask [facility] about records  Ask about hand doctor- PT appts.  Schedule surgery with [facility]  Contact couples counselor at [phone]         There has been demonstrated improvement in functioning while patient has been engaged in psychotherapy/psychological service- if withdrawn the patient would deteriorate and/or relapse.     MICAELA SLADE, [facility]   2023                                                        ______________________________________________________________________    Individual Treatment Plan    Patient's Name: Randi Cleary  YOB: 1955    Date of Creation: 20  Date Treatment Plan Last Reviewed/Revised: 23    DSM5 Diagnoses: 296.89 Bipolar II Disorder Depressed, 300.02 (F41.1) Generalized Anxiety Disorder or Adjustment Disorders  309.89 (F43.8) Other Specified Trauma and Stressor Related Disorder  Psychosocial / Contextual Factors: Patient's entire family of origin has , she now has a sister-in-law and  as support.  Relationship with  is conflictual. She is recovering from surgeries  PROMIS (reviewed every 90 days): PROMIS-10 Scores  PROMIS 10-Global Health (only subscores and total score):   PROMIS-10 Scores Only 2021 3/15/2022 3/15/2022 3/15/2022 3/24/2022 2022 2022   Global Mental Health Score 6 6 6 6 8 12 12   Global Physical Health Score 9 9 9 9 8 11 10   PROMIS TOTAL - SUBSCORES 15 15 15 15 16 23 22       Referral /  "Collaboration:  Referral to another professional/service is not indicated at this time..    Anticipated number of session for this episode of care: 50  Anticipation frequency of session: Biweekly  Anticipated Duration of each session: 53 or more minutes due to intensity of trauma symptoms  Treatment plan will be reviewed in 90 days or when goals have been changed.   There has been demonstrated improvement in functioning while patient has been engaged in psychotherapy/psychological service- if withdrawn the patient would deteriorate and/or relapse.       MeasurableTreatment Goal(s) related to diagnosis / functional impairment(s)  Goal 1: Patient will \"jumpstart, getting going with the things I need to be doing around the house as far as picking up, doing things, trying to do something every day.  Also to lessen the animosity between me and my .\"    I will know I've met my goal when my shoulders are fixed and I can see.      Objective #A (Patient Action)    Patient will  increase frequency of engaging her in ADLs .  Status: Continued - Date(s): 12/10/21, 3/9/22, 6/9/22, 9/2/22, 12/1/22, 3/2/23, 6/6/23    Intervention(s)  Therapist will  engage patient in CBT, specifically behavioral activation .    Objective #B  Patient will   track and record at least 5 pleasant exchanges with . Patient will be able to identify at least 5 positive traits about her  and how he relates to her .  Status: Continued - Date(s): 12/10/21, 3/9/22, 6/9/22, 9/2/22, 12/1/22, 3/2/23, 6/6/23    Intervention(s)  Therapist will  teach assertiveness skills and assign homework related to relationship interactions .    Objective #C  Patient will  reduce level of depressive and anxious features as evidenced by reduction in score on her CHAVO-7 and PHQ-9 (scores of 15 and 16 at first measurment, respectively) .  Status: Continued - Date(s): 12/10/21, 3/9/22, 6/9/22, 9/2/22, 12/1/22, 3/2/23, 6/6/23    Intervention(s)  Therapist will  " "engage patient in person-centered therapy and CBT .    Patient has reviewed and agreed to the above plan.      CRUZ MICAELA BAKER JO, [facility]  June 6, 2023                                                   Randi Cleary          SAFETY PLAN:  Step 1: Warning signs / cues (Thoughts, images, mood, situation, behavior) that a crisis may be developing:  Thoughts: \"I don't want to continue\" \"I am unwanted\"  Images: none  Thinking Processes: ruminating  Mood: anger  Behaviors: isolating/withdrawing , can't stop crying, not taking care of myself and not taking care of my responsibilities  Situations: small triggers, such as not being able to find something, or dropping something   Step 2: Coping strategies - Things I can do to take my mind off of my problems without contacting another person (relaxation technique, physical activity):  Distress Tolerance Strategies:  arts and crafts: drawing, play with my pet , listen to positive and upbeat music: any, change body temperature (ice pack/cold water)  and paced breathing/progressive muscle relaxation  Physical Activities: go for a walk, deep breathing and stretching   Focus on helpful thoughts:  \"You've been through this before, you can get through it again.\"  Step 3: People and social settings that provide distraction:                 Name: Carmen                            Name: Darien                           Name: Aleida       pool, shopping, Carmen's house, [employer]       Step 4: Remind myself of people and things that are important to me and worth living for:  Clifford Little Donna, post-COVID world, options of what could be in your future        Step 5: When I am in crisis, I can ask these people to help me use my safety plan:                 Name: Sidney  Step 6: Making the environment safe:   go to sleep/daydream  Step 7: Professionals or agencies I can contact during a crisis:  [facility] Daytime Number: [phone]  Suicide Prevention Lifeline: " 0-125-484-TALK (0525)  Crisis Text Line Service (available 24 hours a day, 7 days a week): Text [place] to 391327  Local Crisis Services: [facility] Crisis: 987.766.4897  Adults can always access to the emPATH unit at [facility] (no phone number, utilize it like an urgent care or ER where you just show up)     Call 911 or go to my nearest emergency department.       I helped develop this safety plan and agree to use it when needed.  I have been given a copy of this plan.       Client signature _________________________________________________________________  Today s date:  11/24/2020  Adapted from Safety Plan Template 2008 Randi Poole and Robby Barba is reprinted with the express permission of the authors.  No portion of the Safety Plan Template may be reproduced without the express, written permission.  You can contact the authors at bhs@[email or web address].[email or web address] or [email or web address].

## 2023-08-17 NOTE — TELEPHONE ENCOUNTER
----- Message from Pamela Cabrera LICSW sent at 8/17/2023  8:52 AM CDT -----  Regarding: Extended discharge date  Hi UR and Day Treatment teams,  Winnie Cleary has requested an extension for her discharge date.  Please add additional sessions.  Scheduling Request    Patient Name: Randi Cleary  Location of programming: Adult Day Treatment  Start Date: August / 23 / 2023  Group: [facility] on Wednesday at 1:00 PM to 3:00 PM  Attending Provider (MD): Thiago  Number of visits to be scheduled: 5  Duration of Appointment in minutes: 120  Visit Type: In-person or Treatment - 870    Additional notes: Thanks!  Pamela Cabrera

## 2023-08-17 NOTE — GROUP NOTE
Psychotherapy Group Note    PATIENT'S NAME: Randi Cleary  MRN:   [id number]  :   1955  ACCT. NUMBER: [id number]  DATE OF SERVICE: 23  START TIME:  2:00 PM  END TIME:  2:50 PM  FACILITATOR: Pamela Cabrera LICSW  TOPIC: MH EBP Group: Relationship Skills  [facility] Mental Health Day Treatment  TRACK: [facility]    NUMBER OF PARTICIPANTS: 6    Summary of Group / Topics Discussed:  Relationship Skills: Relationship Mapping: Patients identified different types of relationships they have in their life and examined if there is conflict or closeness, the degree of conflict or closeness, and the reason for conflict. The goal of this topic is to help patients gain awareness of the relationships they have with others, identify types of conflict in patients  lives and how they impact symptoms/functioning, and identify how they can improve relationships with relationship and interpersonal skills they have learned.     Patient Session Goals / Objectives:  Familiarized patients with awareness to the different types of relationships they may have with different people and substances in their lives  Discussed and practiced strategies to promote healthier understanding of interpersonal relationships with a focus on awareness of conflict, the causes of conflict, and the ways to transform conflict  Discussed the use of substances and its impact on their relationships      Patient Participation / Response:  Fully participated with the group by sharing personal reflections / insights and openly received / provided feedback with other participants.    Identified / Expressed personal readiness to incorporate effective communication skills    Treatment Plan:  Patient has a current master individualized treatment plan.  See Epic treatment plan for more information.    ASHKAN Hector

## 2023-08-18 ENCOUNTER — TELEPHONE (OUTPATIENT)
Dept: PALLIATIVE MEDICINE | Facility: OTHER | Age: 68
End: 2023-08-18

## 2023-08-18 DIAGNOSIS — G95.20 CORD COMPRESSION (H): ICD-10-CM

## 2023-08-18 RX ORDER — HYDROCODONE BITARTRATE AND ACETAMINOPHEN 5; 325 MG/1; MG/1
1 TABLET ORAL EVERY 6 HOURS PRN
Qty: 112 TABLET | Refills: 0 | Status: SHIPPED | OUTPATIENT
Start: 2023-08-18 | End: 2023-08-31

## 2023-08-18 NOTE — TELEPHONE ENCOUNTER
[facility] Call Center    Phone Message    May a detailed message be left on voicemail: yes     Reason for Call: Medication Refill Request    Has the patient contacted the pharmacy for the refill? Yes   Name of medication being requested: HYDROcodone-acetaminophen (NORCO) 5-325 MG tablet   Provider who prescribed the medication: Silvino  Pharmacy:    [id number] [address]     Date medication is needed: 8/22/23   Action Taken: Other: Pain    Travel Screening: Not Applicable

## 2023-08-18 NOTE — TELEPHONE ENCOUNTER
Patient requesting refill(s) of HYDROcodone-acetaminophen (NORCO) 5-325 MG tablet      Last dispensed from pharmacy on 7/23/23    Patient's last office/virtual visit by prescribing provider on 5/30/23  Next office/virtual appointment scheduled for 8/31/23    Last urine drug screen date 11/16/22  Current opioid agreement on file (completed within the last year) Yes Date of opioid agreement: 11/16/22    E-prescribe to [id number] [place], [place] - [address]     Will route to [facility] for review and preparation of prescription(s).

## 2023-08-18 NOTE — TELEPHONE ENCOUNTER
Pending Prescriptions:                       Disp   Refills    HYDROcodone-acetaminophen (NORCO) 5-325 M*112 ta*0            Sig: Take 1 tablet by mouth every 6 hours as needed           for breakthrough pain or moderate to severe pain           Dispense/start: 8/18/23    Cub

## 2023-08-21 ENCOUNTER — VIRTUAL VISIT (OUTPATIENT)
Dept: PSYCHOLOGY | Facility: CLINIC | Age: 68
End: 2023-08-21
Payer: MEDICARE

## 2023-08-21 ENCOUNTER — OFFICE VISIT (OUTPATIENT)
Dept: FAMILY MEDICINE | Facility: CLINIC | Age: 68
End: 2023-08-21
Payer: MEDICARE

## 2023-08-21 VITALS
SYSTOLIC BLOOD PRESSURE: 131 MMHG | HEART RATE: 89 BPM | OXYGEN SATURATION: 97 % | RESPIRATION RATE: 19 BRPM | BODY MASS INDEX: 36.82 KG/M2 | TEMPERATURE: 97.2 F | DIASTOLIC BLOOD PRESSURE: 84 MMHG | WEIGHT: 228 LBS

## 2023-08-21 DIAGNOSIS — F31.81 BIPOLAR 2 DISORDER (H): Primary | ICD-10-CM

## 2023-08-21 DIAGNOSIS — F43.9 TRAUMA AND STRESSOR-RELATED DISORDER: ICD-10-CM

## 2023-08-21 DIAGNOSIS — F41.1 GENERALIZED ANXIETY DISORDER: ICD-10-CM

## 2023-08-21 PROCEDURE — 90834 PSYTX W PT 45 MINUTES: CPT | Mod: VID | Performed by: SOCIAL WORKER

## 2023-08-21 ASSESSMENT — ANXIETY QUESTIONNAIRES
7. FEELING AFRAID AS IF SOMETHING AWFUL MIGHT HAPPEN: SEVERAL DAYS
5. BEING SO RESTLESS THAT IT IS HARD TO SIT STILL: SEVERAL DAYS
2. NOT BEING ABLE TO STOP OR CONTROL WORRYING: MORE THAN HALF THE DAYS
IF YOU CHECKED OFF ANY PROBLEMS ON THIS QUESTIONNAIRE, HOW DIFFICULT HAVE THESE PROBLEMS MADE IT FOR YOU TO DO YOUR WORK, TAKE CARE OF THINGS AT HOME, OR GET ALONG WITH OTHER PEOPLE: VERY DIFFICULT
4. TROUBLE RELAXING: NEARLY EVERY DAY
GAD7 TOTAL SCORE: 13
1. FEELING NERVOUS, ANXIOUS, OR ON EDGE: MORE THAN HALF THE DAYS
6. BECOMING EASILY ANNOYED OR IRRITABLE: NEARLY EVERY DAY
3. WORRYING TOO MUCH ABOUT DIFFERENT THINGS: SEVERAL DAYS
GAD7 TOTAL SCORE: 13

## 2023-08-21 NOTE — NURSING NOTE
68 year old  Chief Complaint   Patient presents with    New Lifestyle Weight MGMT       Blood pressure 131/84, pulse 89, temperature 97.2  F (36.2  C), temperature source Temporal, resp. rate 19, weight 103.4 kg (228 lb), SpO2 97 %, not currently breastfeeding. Body mass index is 36.82 kg/m .  Patient Active Problem List   Diagnosis    DJD (degenerative joint disease), ankle and foot    Hereditary hemochromatosis (H)    Pulmonary hypertension (H)    Postablative hypothyroidism    Class 2 obesity due to excess calories without serious comorbidity in adult    Prediabetes    KYAW (obstructive sleep apnea)    Type 2 diabetes mellitus without complication, without long-term current use of insulin (H)    Hypokalemia    COPD (chronic obstructive pulmonary disease) (H)    Generalized anxiety disorder    Pain in joint, multiple sites    Restless leg syndrome    Serotonin syndrome    Vitamin B12 deficiency    Vitamin D deficiency    Bipolar 2 disorder (H)    Morbid obesity (H)    History of cervical spinal surgery    Cervical stenosis of spinal canal    Alcohol use disorder, moderate, in sustained remission (H)    Essential hypertension    Psoriatic arthropathy (H)    Arthritis    Gastroesophageal reflux disease with esophagitis without hemorrhage    Numbness in both hands    Chronic, continuous use of opioids    Trauma and stressor-related disorder       Wt Readings from Last 2 Encounters:   08/21/23 103.4 kg (228 lb)   08/15/23 103.3 kg (227 lb 11.2 oz)     BP Readings from Last 3 Encounters:   08/21/23 131/84   08/15/23 (!) 158/89   07/25/23 122/78         Current Outpatient Medications   Medication    albuterol (PROVENTIL) (2.5 MG/3ML) 0.083% neb solution    albuterol (VENTOLIN HFA) 108 (90 Base) MCG/ACT inhaler    benzonatate (TESSALON) 200 MG capsule    Cholecalciferol (VITAMIN D3) 250 MCG (80252 UT) TABS    Cyanocobalamin (VITAMIN B-12) 5000 MCG SUBL    ethacrynic acid (EDECRIN) 25 MG tablet     Fluticasone-Umeclidin-Vilanterol (TRELEGY ELLIPTA) 200-62.5-25 MCG/INH oral inhaler    HYDROcodone-acetaminophen (NORCO) 5-325 MG tablet    KLOR-CON 20 MEQ CR tablet    magnesium 250 MG tablet    medical cannabis (Patient's own supply)    omeprazole (PRILOSEC) 20 MG DR capsule    rOPINIRole (REQUIP) 2 MG tablet    SYNTHROID 150 MCG tablet    vitamin E 400 units TABS     No current facility-administered medications for this visit.       Social History     Tobacco Use    Smoking status: Former     Packs/day: 2.50     Years: 20.00     Pack years: 50.00     Types: Cigarettes     Start date: 1971     Quit date: 2000     Years since quittin.1    Smokeless tobacco: Never   Substance Use Topics    Alcohol use: Not Currently     Comment: relapse 2021 sober     Drug use: Yes     Types: Marijuana       Health Maintenance Due   Topic Date Due    HF ACTION PLAN  Never done    DIABETIC FOOT EXAM  Never done    ADVANCE CARE PLANNING  Never done    COPD ACTION PLAN  Never done    EYE EXAM  Never done    ZOSTER IMMUNIZATION (1 of 2) Never done    MEDICARE ANNUAL WELLNESS VISIT  Never done       No results found for: PAP      2023 3:07 PM

## 2023-08-21 NOTE — PROGRESS NOTES
[facility]                                     Progress Note    Patient Name: Randi Cleary  Date: 8/21/23         Service Type: Individual     Session Start Time: 11:07 AM Session End Time: 11:54 AM     Session Length: 47 minutes    Session #: 196    Attendees: Client attended alone    Service Modality:  Video Visit:      Provider verified identity through the following two step process.  Patient provided:  Patient is known previously to provider    Telemedicine Visit: The patient's condition can be safely assessed and treated via synchronous audio and visual telemedicine encounter.      Reason for Telemedicine Visit: Patient convenience (e.g. access to timely appointments / distance to available provider)    Originating Site (Patient Location): Patient's home    Distant Site (Provider Location): Provider Remote Setting- Home Office    Consent:  The patient/guardian has verbally consented to: the potential risks and benefits of telemedicine (video visit) versus in person care; bill my insurance or make self-payment for services provided; and responsibility for payment of non-covered services.     Patient would like the video invitation sent by:  My Chart    Mode of Communication:  Video Conference via [facility]    Distant Location (Provider):  Off-site    As the provider I attest to compliance with applicable laws and regulations related to telemedicine.      DATA  Extended Session (53+ minutes): No  Interactive Complexity: No  Crisis: No        Progress Since Last Session (Related to Symptoms / Goals / Homework):   Symptoms:  Patient has had no significant change since last session, ongoing challenges with anxiety and mood.      Homework:  Progress made  Ask Grace about records -not done  Ask about hand doctor- PT appts.  Schedule surgery with [facility] -waiting  Contact couples counselor at [phone]  -not done       Episode of Care Goals: Satisfactory progress - ACTION (Actively  working towards change); Intervened by reinforcing change plan / affirming steps taken     Current / Ongoing Stressors and Concerns:   Patient is currently socially isolated. She has a conflictual relationship with her .  She is getting minimal physical activity.  She has had several surgeries.      Treatment Objective(s) Addressed in This Session:   Patient will increase frequency of engaging her in ADLs.  Patient will track and record at least 5 pleasant exchanges with . Patient will be able to identify at least 5 positive traits about her .  Patient will reduce level of depressive and anxious features as evidenced by reduction in score on her CHAVO-7 and PHQ-9 (scores of 15 and 16 at first measurment, respectively).     Intervention:   Supportive Therapy:   Patient continues to review her plan of attack on her health. She's prioritizing her foot, which includes the pre-op and surgery schedule. Patient also wants to look into the polycyhemia. Talked about next steps and how she's managing her own reactions to her health concerns.    The following assessments were completed by patient for this visit:  PHQ9:       7/5/2023     9:57 AM 7/21/2023    10:32 AM 8/1/2023     1:08 PM 8/7/2023    12:59 PM 8/9/2023     8:30 AM 8/16/2023     9:45 AM 8/21/2023     8:11 AM   PHQ-9 SCORE   PHQ-9 Total Score MyChart 18 (Moderately severe depression) 18 (Moderately severe depression) 19 (Moderately severe depression) 21 (Severe depression) 19 (Moderately severe depression) 17 (Moderately severe depression) 13 (Moderate depression)   PHQ-9 Total Score 18    18 18 19 21 19 17 13     GAD7:       5/22/2023    10:12 AM 6/6/2023    10:44 AM 6/22/2023     9:58 AM 7/6/2023     9:11 AM 7/21/2023    10:34 AM 8/7/2023     1:03 PM 8/21/2023    11:07 AM   CHAVO-7 SCORE   Total Score 10 (moderate anxiety) 12 (moderate anxiety) 17 (severe anxiety) 15 (severe anxiety) 18 (severe anxiety) 18 (severe anxiety) 13 (moderate anxiety)    Total Score 10 12 17    17 15    15 18 18    18 13     PROMIS 10-Global Health (only subscores and total score):       5/5/2023     8:12 AM 6/8/2023    12:05 PM 6/22/2023    10:03 AM 7/6/2023     9:12 AM 7/21/2023    10:36 AM 7/28/2023     3:24 PM 8/7/2023     1:07 PM   PROMIS-10 Scores Only   Global Mental Health Score 6 6    6 5    5 5    5 5    5  6    6   Global Physical Health Score 9 9    9 7    7 8    8 7    7  8    8   PROMIS TOTAL - SUBSCORES 15 15    15 12    12 13    13 12    12  14    14       Information is confidential and restricted. Go to Review Flowsheets to unlock data.    Multiple values from one day are sorted in reverse-chronological order        ASSESSMENT: Current Emotional / Mental Status (status of significant symptoms):   Risk status (Self / Other harm or suicidal ideation)   Patient denies current fears or concerns for personal safety.   Patient denies current or recent suicidal ideation or behaviors.   Patient denies current or recent homicidal ideation or behaviors.   Patient denies current or recent self injurious behavior or ideation.   Patient denies other safety concerns.   Patient reports there has been no change in risk factors since their last session.     Patient reports there has been no change in protective factors since their last session.     A safety and risk management plan has been developed including: Patient consented to co-developed safety plan on 11/24/2020, updated 2/20/23.  Safety and risk management plan was reviewed.   Patient agreed to use safety plan should any safety concerns arise.  A copy was made available to the patient.     Appearance:   Appropriate    Eye Contact:   Good    Psychomotor Behavior: Normal    Attitude:   Cooperative    Orientation:   All   Speech    Rate / Production: Normal/ Responsive    Volume:  Normal    Mood:    Anxious    Affect:    Appropriate    Thought Content:  Clear    Thought Form:  Coherent    Insight:    Good      Medication  Review:   No changes to current psychiatric medication(s)     Medication Compliance:   Yes     Changes in Health Issues:   None reported     Chemical Use Review:   Substance Use: Chemical use reviewed, no active concerns identified Nothing used since 2021.     Tobacco Use: No current tobacco use.      Diagnosis:  1. Bipolar 2 disorder (H)    2. Generalized anxiety disorder    3. Trauma and stressor-related disorder        Collateral Reports Completed:   Not Applicable    PLAN: (Patient Tasks / Therapist Tasks / Other)  Ask [facility] about records  Ask about hand doctor- PT appts.  Schedule surgery with [facility]  Contact couples counselor at [phone]         There has been demonstrated improvement in functioning while patient has been engaged in psychotherapy/psychological service- if withdrawn the patient would deteriorate and/or relapse.     B MICAELA BAKER, [facility]   2023                                                        ______________________________________________________________________    Individual Treatment Plan    Patient's Name: Randi Cleary  YOB: 1955    Date of Creation: 20  Date Treatment Plan Last Reviewed/Revised: 23    DSM5 Diagnoses: 296.89 Bipolar II Disorder Depressed, 300.02 (F41.1) Generalized Anxiety Disorder or Adjustment Disorders  309.89 (F43.8) Other Specified Trauma and Stressor Related Disorder  Psychosocial / Contextual Factors: Patient's entire family of origin has , she now has a sister-in-law and  as support.  Relationship with  is conflictual. She is recovering from surgeries  PROMIS (reviewed every 90 days): PROMIS-10 Scores  PROMIS 10-Global Health (only subscores and total score):   PROMIS-10 Scores Only 2021 3/15/2022 3/15/2022 3/15/2022 3/24/2022 2022 2022   Global Mental Health Score 6 6 6 6 8 12 12   Global Physical Health Score 9 9 9 9 8 11 10   PROMIS TOTAL - SUBSCORES 15 15 15 15 16 23  "22       Referral / Collaboration:  Referral to another professional/service is not indicated at this time..    Anticipated number of session for this episode of care: 50  Anticipation frequency of session: Biweekly  Anticipated Duration of each session: 53 or more minutes due to intensity of trauma symptoms  Treatment plan will be reviewed in 90 days or when goals have been changed.   There has been demonstrated improvement in functioning while patient has been engaged in psychotherapy/psychological service- if withdrawn the patient would deteriorate and/or relapse.       MeasurableTreatment Goal(s) related to diagnosis / functional impairment(s)  Goal 1: Patient will \"jumpstart, getting going with the things I need to be doing around the house as far as picking up, doing things, trying to do something every day.  Also to lessen the animosity between me and my .\"    I will know I've met my goal when my shoulders are fixed and I can see.      Objective #A (Patient Action)    Patient will  increase frequency of engaging her in ADLs .  Status: Continued - Date(s): 12/10/21, 3/9/22, 6/9/22, 9/2/22, 12/1/22, 3/2/23, 6/6/23    Intervention(s)  Therapist will  engage patient in CBT, specifically behavioral activation .    Objective #B  Patient will   track and record at least 5 pleasant exchanges with . Patient will be able to identify at least 5 positive traits about her  and how he relates to her .  Status: Continued - Date(s): 12/10/21, 3/9/22, 6/9/22, 9/2/22, 12/1/22, 3/2/23, 6/6/23    Intervention(s)  Therapist will  teach assertiveness skills and assign homework related to relationship interactions .    Objective #C  Patient will  reduce level of depressive and anxious features as evidenced by reduction in score on her CHAVO-7 and PHQ-9 (scores of 15 and 16 at first measurment, respectively) .  Status: Continued - Date(s): 12/10/21, 3/9/22, 6/9/22, 9/2/22, 12/1/22, 3/2/23, " "6/6/23    Intervention(s)  Therapist will  engage patient in person-centered therapy and CBT .    Patient has reviewed and agreed to the above plan.      MICAELA SLADE, [facility]  June 6, 2023                                                   Randi Cleary          SAFETY PLAN:  Step 1: Warning signs / cues (Thoughts, images, mood, situation, behavior) that a crisis may be developing:  Thoughts: \"I don't want to continue\" \"I am unwanted\"  Images: none  Thinking Processes: ruminating  Mood: anger  Behaviors: isolating/withdrawing , can't stop crying, not taking care of myself and not taking care of my responsibilities  Situations: small triggers, such as not being able to find something, or dropping something   Step 2: Coping strategies - Things I can do to take my mind off of my problems without contacting another person (relaxation technique, physical activity):  Distress Tolerance Strategies:  arts and crafts: drawing, play with my pet , listen to positive and upbeat music: any, change body temperature (ice pack/cold water)  and paced breathing/progressive muscle relaxation  Physical Activities: go for a walk, deep breathing and stretching   Focus on helpful thoughts:  \"You've been through this before, you can get through it again.\"  Step 3: People and social settings that provide distraction:                 Name: Carmen                            Name: Darien                           Name: Aleida       pool, shopping, Carmen's house, [employer]       Step 4: Remind myself of people and things that are important to me and worth living for:  Sidney, Clifford, Aleida, post-COVID world, options of what could be in your future        Step 5: When I am in crisis, I can ask these people to help me use my safety plan:                 Name: Sidney  Step 6: Making the environment safe:   go to sleep/daydream  Step 7: Professionals or agencies I can contact during a crisis:  [facility] Daytime Number: " [phone]  Suicide Prevention Lifeline: 1-830-759-TALK (2220)  Crisis Text Line Service (available 24 hours a day, 7 days a week): Text [place] to 285488  Local Crisis Services: [facility] Crisis: [phone]  Adults can always access to the emPATH unit at [facility] (no phone number, utilize it like an urgent care or ER where you just show up)     Call 911 or go to my nearest emergency department.       I helped develop this safety plan and agree to use it when needed.  I have been given a copy of this plan.       Client signature _________________________________________________________________  Today s date:  11/24/2020  Adapted from Safety Plan Template 2008 Randi Poole and Robby Barba is reprinted with the express permission of the authors.  No portion of the Safety Plan Template may be reproduced without the express, written permission.  You can contact the authors at [email or web address] or [email or web address].
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
## 2023-08-21 NOTE — PATIENT INSTRUCTIONS
"  DIET ASSESSMENT/PLAN:  Food choices are healthful and Winnie likes a variety of foods, however eating pattern is inconsistent. Fatigue is contributing to some difficulty preparing meals.   Consider moving toward an eating plan that is a bit more consistent, with 3 meals per day. This may also help support energy levels.   It's okay to only prepare one meal \"from scratch\" per week. Meal prep can also be helpful in preparing one food for use multiple times later in the week (roast chicken, roast a pan of vegetables).     PHYSICAL ACTIVITY ASSESSMENT/PLAN:  Exercise is very, very limited due to back pains and right ankle pain and shoulder pains and deconditioning. Will be having a surgery on RIGHT foot in about a month  Start beginners' yoga online with either [employer] program or Yoga with [employer]. Plan to do at least 4 days/week  Start taking daily walks in the morning and in the afternoon. Each walk should be for about 10 minutes at the start and then build up as tolerated.   Start using your indoor bicycle for 10 minutes, 1-2 times/day and build up from there.  Use your 5 lbs weights daily in the morning and evening when sitting. Plan for at least 10 minutes of exercise per session.   *If any chest pain with exercise, then stop and call your clinic    Follow-up:  In about one month with Ekaterina, on Thursday September 28 at 3:00 pm in clinic. Call clinic if need to reschedule [phone]  As needed with Dr. Matthews    --Devendra Matthews MD and Ekaterina Horton RD  "

## 2023-08-23 ENCOUNTER — TELEPHONE (OUTPATIENT)
Dept: BEHAVIORAL HEALTH | Facility: CLINIC | Age: 68
End: 2023-08-23
Payer: MEDICARE

## 2023-08-23 ENCOUNTER — VIRTUAL VISIT (OUTPATIENT)
Dept: PSYCHOLOGY | Facility: CLINIC | Age: 68
End: 2023-08-23
Payer: MEDICARE

## 2023-08-23 DIAGNOSIS — F43.9 TRAUMA AND STRESSOR-RELATED DISORDER: ICD-10-CM

## 2023-08-23 DIAGNOSIS — F41.1 GENERALIZED ANXIETY DISORDER: ICD-10-CM

## 2023-08-23 DIAGNOSIS — F31.81 BIPOLAR 2 DISORDER (H): Primary | ICD-10-CM

## 2023-08-23 PROCEDURE — 90837 PSYTX W PT 60 MINUTES: CPT | Mod: VID | Performed by: SOCIAL WORKER

## 2023-08-23 NOTE — PROGRESS NOTES
[facility]                                     Progress Note    Patient Name: Randi Cleary  Date: 8/23/23         Service Type: Individual     Session Start Time: 10:05 AM Session End Time: 10:58 AM     Session Length: 53 minutes    Session #: 197    Attendees: Client attended alone    Service Modality:  Video Visit:      Provider verified identity through the following two step process.  Patient provided:  Patient is known previously to provider    Telemedicine Visit: The patient's condition can be safely assessed and treated via synchronous audio and visual telemedicine encounter.      Reason for Telemedicine Visit: Patient convenience (e.g. access to timely appointments / distance to available provider)    Originating Site (Patient Location): Patient's home    Distant Site (Provider Location): Provider Remote Setting- Home Office    Consent:  The patient/guardian has verbally consented to: the potential risks and benefits of telemedicine (video visit) versus in person care; bill my insurance or make self-payment for services provided; and responsibility for payment of non-covered services.     Patient would like the video invitation sent by:  My Chart    Mode of Communication:  Video Conference via [facility]    Distant Location (Provider):  Off-site    As the provider I attest to compliance with applicable laws and regulations related to telemedicine.      DATA  Extended Session (53+ minutes): PROLONGED SERVICE IN THE OUTPATIENT SETTING REQUIRING DIRECT (FACE-TO-FACE) PATIENT CONTACT BEYOND THE USUAL SERVICE:    - High distress and under complex circumstances.  See Data section for details  Interactive Complexity: No  Crisis: No        Progress Since Last Session (Related to Symptoms / Goals / Homework):   Symptoms:  Patient is stable with no significant changes from prior session.      Homework:  Progress made  Ask Grace about records -not yet done  Ask about hand doctor- PT appts.  -hand doctor scheduled, but not for a ways out  Schedule surgery with [facility] -date selected, but not booked  Contact couples counselor at [phone]        Episode of Care Goals: Satisfactory progress - ACTION (Actively working towards change); Intervened by reinforcing change plan / affirming steps taken     Current / Ongoing Stressors and Concerns:   Patient is currently socially isolated. She has a conflictual relationship with her .  She is getting minimal physical activity.  She has had several surgeries. Patient's car was just insured.     Treatment Objective(s) Addressed in This Session:   Patient will increase frequency of engaging her in ADLs.  Patient will track and record at least 5 pleasant exchanges with . Patient will be able to identify at least 5 positive traits about her .  Patient will reduce level of depressive and anxious features as evidenced by reduction in score on her CHAVO-7 and PHQ-9 (scores of 15 and 16 at first measurment, respectively).     Intervention:   Supportive Therapy:   Processed weight management appointment. Discussed surgery. Talked about next steps for her health. Reviewed homework and identified successes and barriers to completion. Set updated goals for the week.    The following assessments were completed by patient for this visit:  PHQ9:       7/5/2023     9:57 AM 7/21/2023    10:32 AM 8/1/2023     1:08 PM 8/7/2023    12:59 PM 8/9/2023     8:30 AM 8/16/2023     9:45 AM 8/21/2023     8:11 AM   PHQ-9 SCORE   PHQ-9 Total Score MyChart 18 (Moderately severe depression) 18 (Moderately severe depression) 19 (Moderately severe depression) 21 (Severe depression) 19 (Moderately severe depression) 17 (Moderately severe depression) 13 (Moderate depression)   PHQ-9 Total Score 18    18 18 19 21 19 17 13     GAD7:       5/22/2023    10:12 AM 6/6/2023    10:44 AM 6/22/2023     9:58 AM 7/6/2023     9:11 AM 7/21/2023    10:34 AM 8/7/2023     1:03 PM 8/21/2023     11:07 AM   CHAVO-7 SCORE   Total Score 10 (moderate anxiety) 12 (moderate anxiety) 17 (severe anxiety) 15 (severe anxiety) 18 (severe anxiety) 18 (severe anxiety) 13 (moderate anxiety)   Total Score 10 12 17    17 15    15 18 18    18 13     PROMIS 10-Global Health (only subscores and total score):       5/5/2023     8:12 AM 6/8/2023    12:05 PM 6/22/2023    10:03 AM 7/6/2023     9:12 AM 7/21/2023    10:36 AM 7/28/2023     3:24 PM 8/7/2023     1:07 PM   PROMIS-10 Scores Only   Global Mental Health Score 6 6    6 5    5 5    5 5    5  6    6   Global Physical Health Score 9 9    9 7    7 8    8 7    7  8    8   PROMIS TOTAL - SUBSCORES 15 15    15 12    12 13    13 12    12  14    14       Information is confidential and restricted. Go to Review Flowsheets to unlock data.    Multiple values from one day are sorted in reverse-chronological order        ASSESSMENT: Current Emotional / Mental Status (status of significant symptoms):   Risk status (Self / Other harm or suicidal ideation)   Patient denies current fears or concerns for personal safety.   Patient denies current or recent suicidal ideation or behaviors.   Patient denies current or recent homicidal ideation or behaviors.   Patient denies current or recent self injurious behavior or ideation.   Patient denies other safety concerns.   Patient reports there has been no change in risk factors since their last session.     Patient reports there has been no change in protective factors since their last session.     A safety and risk management plan has been developed including: Patient consented to co-developed safety plan on 11/24/2020, updated 2/20/23.  Safety and risk management plan was reviewed.   Patient agreed to use safety plan should any safety concerns arise.  A copy was made available to the patient.     Appearance:   Appropriate    Eye Contact:   Good    Psychomotor Behavior: Normal    Attitude:   Cooperative    Orientation:   All   Speech    Rate /  Production: Normal/ Responsive    Volume:  Normal    Mood:    Anxious    Affect:    Appropriate    Thought Content:  Clear    Thought Form:  Coherent    Insight:    Good      Medication Review:   No changes to current psychiatric medication(s)     Medication Compliance:   Yes     Changes in Health Issues:   None reported     Chemical Use Review:   Substance Use: Chemical use reviewed, no active concerns identified Nothing used since 2021.     Tobacco Use: No current tobacco use.      Diagnosis:  1. Bipolar 2 disorder (H)    2. Generalized anxiety disorder    3. Trauma and stressor-related disorder          Collateral Reports Completed:   Not Applicable    PLAN: (Patient Tasks / Therapist Tasks / Other)  Focus on your needs  Ask Pritzker about records  Ask about hand doctor- PT appts.  Schedule surgery with [facility]  Contact couples counselor at [phone]         There has been demonstrated improvement in functioning while patient has been engaged in psychotherapy/psychological service- if withdrawn the patient would deteriorate and/or relapse.     MICAELA SLADE, [facility]   2023                                                        ______________________________________________________________________    Individual Treatment Plan    Patient's Name: Randi Cleary  YOB: 1955    Date of Creation: 20  Date Treatment Plan Last Reviewed/Revised: 23    DSM5 Diagnoses: 296.89 Bipolar II Disorder Depressed, 300.02 (F41.1) Generalized Anxiety Disorder or Adjustment Disorders  309.89 (F43.8) Other Specified Trauma and Stressor Related Disorder  Psychosocial / Contextual Factors: Patient's entire family of origin has , she now has a sister-in-law and  as support.  Relationship with  is conflictual. She is recovering from surgeries  PROMIS (reviewed every 90 days): PROMIS-10 Scores  PROMIS 10-Global Health (only subscores and total score):   PROMIS-10 Scores  "Only 12/14/2021 3/15/2022 3/15/2022 3/15/2022 3/24/2022 4/1/2022 6/9/2022   Global Mental Health Score 6 6 6 6 8 12 12   Global Physical Health Score 9 9 9 9 8 11 10   PROMIS TOTAL - SUBSCORES 15 15 15 15 16 23 22       Referral / Collaboration:  Referral to another professional/service is not indicated at this time..    Anticipated number of session for this episode of care: 50  Anticipation frequency of session: Biweekly  Anticipated Duration of each session: 53 or more minutes due to intensity of trauma symptoms  Treatment plan will be reviewed in 90 days or when goals have been changed.   There has been demonstrated improvement in functioning while patient has been engaged in psychotherapy/psychological service- if withdrawn the patient would deteriorate and/or relapse.       MeasurableTreatment Goal(s) related to diagnosis / functional impairment(s)  Goal 1: Patient will \"jumpstart, getting going with the things I need to be doing around the house as far as picking up, doing things, trying to do something every day.  Also to lessen the animosity between me and my .\"    I will know I've met my goal when my shoulders are fixed and I can see.      Objective #A (Patient Action)    Patient will  increase frequency of engaging her in ADLs .  Status: Continued - Date(s): 12/10/21, 3/9/22, 6/9/22, 9/2/22, 12/1/22, 3/2/23, 6/6/23    Intervention(s)  Therapist will  engage patient in CBT, specifically behavioral activation .    Objective #B  Patient will   track and record at least 5 pleasant exchanges with . Patient will be able to identify at least 5 positive traits about her  and how he relates to her .  Status: Continued - Date(s): 12/10/21, 3/9/22, 6/9/22, 9/2/22, 12/1/22, 3/2/23, 6/6/23    Intervention(s)  Therapist will  teach assertiveness skills and assign homework related to relationship interactions .    Objective #C  Patient will  reduce level of depressive and anxious features as " "evidenced by reduction in score on her CHAVO-7 and PHQ-9 (scores of 15 and 16 at first measurment, respectively) .  Status: Continued - Date(s): 12/10/21, 3/9/22, 6/9/22, 9/2/22, 12/1/22, 3/2/23, 6/6/23    Intervention(s)  Therapist will  engage patient in person-centered therapy and CBT .    Patient has reviewed and agreed to the above plan.      MICAELA SLADE, [facility]  June 6, 2023                                                   Randi Cleary          SAFETY PLAN:  Step 1: Warning signs / cues (Thoughts, images, mood, situation, behavior) that a crisis may be developing:  Thoughts: \"I don't want to continue\" \"I am unwanted\"  Images: none  Thinking Processes: ruminating  Mood: anger  Behaviors: isolating/withdrawing , can't stop crying, not taking care of myself and not taking care of my responsibilities  Situations: small triggers, such as not being able to find something, or dropping something   Step 2: Coping strategies - Things I can do to take my mind off of my problems without contacting another person (relaxation technique, physical activity):  Distress Tolerance Strategies:  arts and crafts: drawing, play with my pet , listen to positive and upbeat music: any, change body temperature (ice pack/cold water)  and paced breathing/progressive muscle relaxation  Physical Activities: go for a walk, deep breathing and stretching   Focus on helpful thoughts:  \"You've been through this before, you can get through it again.\"  Step 3: People and social settings that provide distraction:                 Name: Carmen                            Name: Darien                           Name: Aleida       pool, shopping, Carmen's house, [employer]       Step 4: Remind myself of people and things that are important to me and worth living for:  Sidney, Clifford, Aleida, post-COVID world, options of what could be in your future        Step 5: When I am in crisis, I can ask these people to help me use my safety plan:                 " Name: Sidney  Step 6: Making the environment safe:   go to sleep/daydream  Step 7: Professionals or agencies I can contact during a crisis:  [facility] Daytime Number: [phone]  Suicide Prevention Lifeline: 9-484-643-BBZU (4394)  Crisis Text Line Service (available 24 hours a day, 7 days a week): Text [place] to 285137  Local Crisis Services: [facility] Crisis: [phone]  Adults can always access to the emPATH unit at [facility] (no phone number, utilize it like an urgent care or ER where you just show up)     Call 911 or go to my nearest emergency department.       I helped develop this safety plan and agree to use it when needed.  I have been given a copy of this plan.       Client signature _________________________________________________________________  Today s date:  11/24/2020  Adapted from Safety Plan Template 2008 Randi Poole and Robby Barba is reprinted with the express permission of the authors.  No portion of the Safety Plan Template may be reproduced without the express, written permission.  You can contact the authors at [email or web address] or [email or web address].

## 2023-08-30 ENCOUNTER — HOSPITAL ENCOUNTER (OUTPATIENT)
Dept: BEHAVIORAL HEALTH | Facility: CLINIC | Age: 68
Discharge: HOME | End: 2023-08-30
Attending: PSYCHIATRY & NEUROLOGY
Payer: MEDICARE

## 2023-08-30 PROCEDURE — 90853 GROUP PSYCHOTHERAPY: CPT | Performed by: SOCIAL WORKER

## 2023-08-31 ENCOUNTER — OFFICE VISIT (OUTPATIENT)
Dept: PALLIATIVE MEDICINE | Facility: OTHER | Age: 68
End: 2023-08-31
Payer: MEDICARE

## 2023-08-31 VITALS
DIASTOLIC BLOOD PRESSURE: 75 MMHG | BODY MASS INDEX: 36.98 KG/M2 | WEIGHT: 229 LBS | HEART RATE: 85 BPM | OXYGEN SATURATION: 97 % | SYSTOLIC BLOOD PRESSURE: 115 MMHG

## 2023-08-31 DIAGNOSIS — G25.81 RESTLESS LEGS SYNDROME (RLS): ICD-10-CM

## 2023-08-31 DIAGNOSIS — G44.86 CERVICOGENIC HEADACHE: ICD-10-CM

## 2023-08-31 DIAGNOSIS — M19.071 OSTEOARTHRITIS OF RIGHT ANKLE AND FOOT: Primary | ICD-10-CM

## 2023-08-31 PROCEDURE — 99215 OFFICE O/P EST HI 40 MIN: CPT | Performed by: ANESTHESIOLOGY

## 2023-08-31 PROCEDURE — G0463 HOSPITAL OUTPT CLINIC VISIT: HCPCS | Performed by: ANESTHESIOLOGY

## 2023-08-31 RX ORDER — OXYCODONE HYDROCHLORIDE 5 MG/1
5 TABLET ORAL EVERY 6 HOURS PRN
Qty: 56 TABLET | Refills: 0 | Status: SHIPPED | OUTPATIENT
Start: 2023-08-31 | End: 2023-09-15

## 2023-08-31 RX ORDER — ROPINIROLE 2 MG/1
TABLET, FILM COATED ORAL
Qty: 90 TABLET | Refills: 3 | Status: SHIPPED | OUTPATIENT
Start: 2023-08-31 | End: 2023-12-19

## 2023-08-31 ASSESSMENT — PAIN SCALES - GENERAL: PAINLEVEL: EXTREME PAIN (8)

## 2023-08-31 NOTE — PROGRESS NOTES
Patient presents to the clinic today for a visit with AXEL WIGGINS MD regarding Pain Management.          4/11/2023     8:01 AM 5/30/2023    12:56 PM 8/31/2023     8:53 AM   PEG Score   PEG Total Score 8 10 9.33       UDS/CSA- 11.16.2022    Medications- NORCO 08.31.2023 8am    QUESTIONS:getting headaches. Injection did not work as well last time.     Lorena GOODWIN [facility] Visit Facilitator

## 2023-08-31 NOTE — GROUP NOTE
Process Group Note    PATIENT'S NAME: Randi Cleary  MRN:   [id number]  :   1955  ACCT. NUMBER: [id number]  DATE OF SERVICE: 23  START TIME:  1:00 PM  END TIME:  1:50 PM  FACILITATOR: Pamela Cabrera [facility]  TOPIC:  Process Group    Diagnoses:  296.33 (F33.2) Major Depressive Disorder, Recurrent Episode, Severe With anxious distress  300.02 (F41.1) Generalized Anxiety Disorder    Rule out:  296.89 Bipolar II Disorder vs. 314.01 (F90.2) Attention-Deficit/Hyperactivity Disorder   Combined presentation       [facility]  TRACK: [facility]    NUMBER OF PARTICIPANTS: 4        Data:    Session content: At the start of this group, patients were invited to check in by identifying themselves, describing their current emotional status, and identifying issues to address in this group.   Area(s) of treatment focus addressed in this session included Symptom Management, Personal Safety, and Community Resources/Discharge Planning.  Winnie took time to reported feeling out of sorts.   She shared that she goofed up as she misplaced the written appointment calendar she uses.  She reported feeling out of control.   She shared stressor of her 's care breaking down and the beginning of a search for a new car.   Client shared details not central to the story, so writer asked them to return to the primary concern.  She set a goal to delay the delivery of a new mattress and to paint her bedroom.  Client denied suicidal ideation, intent and plan.  She is taking medications as planned.    Therapeutic Interventions/Treatment Strategies:  Psychotherapist offered support, feedback and validation and reinforced use of skills. Treatment modalities used include Cognitive Behavioral Therapy. Interventions include Coping Skills: Promoted understanding of how and when to apply grounding strategies to reduce distress and increase presence in the moment.    Assessment:    Patient response:    Patient responded to session by being attentive    Possible barriers to participation / learning include: and no barriers identified    Health Issues:   None reported       Substance Use Review:   Substance Use: No active concerns identified.    Mental Status/Behavioral Observations  Appearance:   Appropriate   Eye Contact:   Good   Psychomotor Behavior: Normal   Attitude:   Cooperative  Friendly  Orientation:   All  Speech   Rate / Production: Normal    Volume:  Normal   Mood:    Anxious  Depressed  Fearful  Affect:    Appropriate   Thought Content:   Rumination  Thought Form:  Tangential     Insight:    Good     Plan:   Safety Plan: No current safety concerns identified.  Recommended that patient call 911 or go to the local ED should there be a change in any of these risk factors.   Barriers to treatment: None identified  Patient Contracts (see media tab):  None  Substance Use: Not addressed in session   Continue or Discharge: Patient will continue in Adult Day Treatment (ADT)  as planned. Patient is likely to benefit from learning and using skills as they work toward the goals identified in their treatment plan.      Pamela Cabrera, [facility]  August 31, 2023

## 2023-08-31 NOTE — GROUP NOTE
Psychotherapy Group Note    PATIENT'S NAME: Randi Cleary  MRN:   [id number]  :   1955  ACCT. NUMBER: [id number]  DATE OF SERVICE: 23  START TIME:  2:00 PM  END TIME:  2:50 PM  FACILITATOR: Pamela Cabrera LICSW  TOPIC: MH EBP Group: Coping Skills  [facility] Day Treatment  TRACK: [facility]    NUMBER OF PARTICIPANTS: 4    Summary of Group / Topics Discussed:  Coping Skills: Additional Coping Skills:  Patients discussed and practiced skills to prepare for discharge.  Reviewed the benefits of applying the aforementioned coping strategies.  Patients explored how these strategies might be applied to daily stressors or distressing situations.    Patient Session Goals / Objectives:  Understand the purpose and benefits of applying social and leisure support as coping strategies  Reviewed structure and support members would like to put in place.  Reviewed skills to try new activities or to expand relationships for support.  Address barriers to utilizing coping skills when in distress.      Patient Participation / Response:  Fully participated with the group by sharing personal reflections / insights and openly received / provided feedback with other participants.    Identified barriers to applying coping skills    Treatment Plan:  Patient has a current master individualized treatment plan.  See Epic treatment plan for more information.    ASHKAN Hector

## 2023-08-31 NOTE — PROGRESS NOTES
[facility] - Office Visit    ASSESSMENT & PLAN     Randi was seen today for pain.    Diagnoses and all orders for this visit:    Osteoarthritis of right ankle and foot  -     oxyCODONE (ROXICODONE) 5 MG tablet; Take 1 tablet (5 mg) by mouth every 6 hours as needed for pain (chronic pain)    Cervicogenic headache  -     Physical Therapy Referral; Future    Restless legs syndrome (RLS)  -     rOPINIRole (REQUIP) 2 MG tablet; One tab 3 pm, one bedtime, and one during night on waking        Patient Instructions     [facility] Number:  [phone]  Call with any questions about your care and for scheduling assistance.   Calls are returned Monday through Friday between 8 AM and 4:30 PM. We usually get back to you within 2 business days depending on the issue/request.    If we are prescribing your medications:  For opioid medication refills, call the clinic or send a Tweetminster message 7 days in advance.  Please include:  Name of requested medication  Name of the pharmacy.  For non-opioid medications, call your pharmacy directly to request a refill. Please allow 3-4 days to be processed.   Per [place] State Law:  All controlled substance prescriptions must be filled within 30 days of being written.    For those controlled substances allowing refills, pickup must occur within 30 days of last fill.      We believe regular attendance is key to your success in our program!    Any time you are unable to keep your appointment we ask that you call us at least 24 hours in advance to cancel.This will allow us to offer the appointment time to another patient.   Multiple missed appointments may lead to dismissal from the clinic.     PLAN:    Referral made for craniosacral therapy to help with headaches related neck pain.    Discussed putting 2 tennis balls into a tube sock and placing under the back of your scalp while you lie down to help with headaches.  May also  "look into obtaining a foam roller to help with the pain the top of your neck reviewed your previous surgery.    Change hydrocodone to oxycodone 5 mg 4 times a day as needed for pain, call Dr. Wiggins in 10 to 12 days with an update for refills.    Your planning surgery for your ankle.  You may then look into surgery for your shoulders.    You are scheduled to meet for a new psychopharmacology evaluation next month.    You are working with the clinic to help with weight loss.    Renewed the ropinirole 2 mg 3 times a day for restless leg syndrome, when you establish with a new primary care provider will ask them to take over managing.    We will discuss with your new primary care provider concerns for dizziness.    Continue working with your psychotherapist.    Follow-up with Dr. Wiggins in 8 weeks      -----  AXEL WIGGINS MD  [facility]       SUBJECTIVE      Randi Cleary is a 68 year old year old female who presents to clinic today for the following:     Follow-up for arthralgias, cervical radiculopathy mood disorder.    She reviews today \"not so good\".  She has some sort of daily pain.  It varies with her activities.  She is trying to do more activities such as cooking, notably if she does too much then she has to rest for 2 days, acknowledges hard to pace herself.    She is wanting to lose some weight and cook more from home, finds she gets overwhelmed and eats junk and processed food hard to lose weight.    She has completed her pool physical therapy.    On 6/18 did have an occipital nerve block.  Helped with her headaches perhaps a week and it did not continue.  Is also describes she can have pain in her neck and shoulders that comes up over the top of her head or outline her eyes and feels \"foggy\"    She describes when she gets the pain particular in her shoulders, gets anxious is under the impression that.  She gets more emotional.    Describes once in the pool when there is " "another aerobics class in the water , water became wavy and she got dizzy and sick.    She reviews trying to coordinate with her primary care providers.    She had been doing a group counseling sessions with no that did not meet as regularly, she has gotten extension to continue.    She was to see a new provider at the [place] for pharmacologic evaluation.  She was sick on that day and has been rescheduled.    She reviews stopping her oxcarbazepine, melatonin , and lithium orotate.  She feels a bit more \"clear\", can still have mood lability and anxiety.    She is distressed that her previous psychiatrist Dr. Olivera is not available and cannot get her records for 30 years of treatment.  I suggested she talk to the pharmacy to review the list of medications tried.    She has changed working with [facility] gone back to [facility].  She was to have an ankle surgery few years ago then COVID happened.  She is having a reevaluation with that surgeon may have an surgery the end of September.  Describes her right foot is 2 and half sizes shorter now than her right foot, has been working with insoles.  There is atrophy in her right leg.  Acknowledged that affects her gait which may throw off shoulders and other factors.    She is having some neuropathy in both legs up to the she attributes to her diabetes though now is considered prediabetic.  plans to return to the endocrinologist she worked with for a pheochromocytoma    She is working with [facility] for her shoulders, previously was told she was too emotionally unstable for the surgery but feels if she manages the ankle surgery wellness is which is less in the fall she might be able to have that.    She had gone to [facility] regarding her weight, shared with them she weighs more than she did in the past notes she is also lost some height.    She has complained of some dizziness, has not shared her primary care " providers.    She is working with hematology regarding her hematocrit, ptosis, having some phlebotomy again in October.  There are varying levels of her hemoglobin related to her restless legs.  Has been using droperidol 2 mg taken in the afternoon dinnertime and then bedtime which is helpful.  I suggested she transition to the primary care provider when she establishes.    She has found the hydrocodone 5 mg 4 times a day really seems to be much less effective.  We discussed rotating to oxycodone which she has not used in the past.    Last urine drug test in November, will be obtained today  reviewed      Current Outpatient Medications:     albuterol (PROVENTIL) (2.5 MG/3ML) 0.083% neb solution, Take 1 vial (2.5 mg) by nebulization every 4 hours as needed for shortness of breath / dyspnea or wheezing, Disp: 360 mL, Rfl: 5    albuterol (VENTOLIN HFA) 108 (90 Base) MCG/ACT inhaler, Inhale 2 puffs into the lungs every 6 hours, Disp: 18 g, Rfl: 11    benzonatate (TESSALON) 200 MG capsule, Take 1 capsule (200 mg) by mouth 3 times daily as needed for cough, Disp: 30 capsule, Rfl: 1    Cholecalciferol (VITAMIN D3) 250 MCG (10513 UT) TABS, Take 1 tablet by mouth daily 15230/day, Disp: , Rfl:     Cyanocobalamin (VITAMIN B-12) 5000 MCG SUBL, Place 2-3 sprays under the tongue daily Unknown dose. 2 or 3 sprays/day, Disp: , Rfl:     ethacrynic acid (EDECRIN) 25 MG tablet, Take 1 tablet (25 mg) by mouth every other day, Disp: 45 tablet, Rfl: 3    Fluticasone-Umeclidin-Vilanterol (TRELEGY ELLIPTA) 200-62.5-25 MCG/INH oral inhaler, Inhale 1 puff into the lungs daily, Disp: 4 each, Rfl: 3    KLOR-CON 20 MEQ CR tablet, Take 1 tablet (20 mEq total) by mouth 2 (two) times a day., Disp: 180 tablet, Rfl: 0    magnesium 250 MG tablet, Take 1 tablet by mouth 2 times daily, Disp: , Rfl:     medical cannabis (Patient's own supply), See Admin Instructions (The purpose of this order is to document that the patient reports taking medical  cannabis.  This is not a prescription, and is not used to certify that the patient has a qualifying medical condition.) Flower, Disp: , Rfl:     omeprazole (PRILOSEC) 20 MG DR capsule, Take 1 capsule (20 mg) by mouth daily, Disp: 90 capsule, Rfl: 3    oxyCODONE (ROXICODONE) 5 MG tablet, Take 1 tablet (5 mg) by mouth every 6 hours as needed for pain (chronic pain), Disp: 56 tablet, Rfl: 0    rOPINIRole (REQUIP) 2 MG tablet, One tab 3 pm, one bedtime, and one during night on waking, Disp: 90 tablet, Rfl: 3    SYNTHROID 150 MCG tablet, Take 1 tablet (150 mcg) by mouth daily, Disp: 90 tablet, Rfl: 4    vitamin E 400 units TABS, Take 800 Units by mouth daily, Disp: , Rfl:       Review of Systems   General, psych, musculoskeletal, bowels and bladder otherwise normal other than above.          OBJECTIVE   /75   Pulse 85   Wt 103.9 kg (229 lb)   SpO2 97%   BMI 36.98 kg/m         Physical Exam  General alert, clear sensorium, ambulates with cane.  Moves very slowly from the chair to standing.  No respiratory distress  Cardiovascular: Normal rate  Lungs: Pulmonary effort is normal, speaking in full sentences  MSK: Increased tone in her trapezius, quite tender around the area of her previous cervical scar, tender paracervical spinal muscles occipital  Skin: Warm and dry. No concerning rashes or lesions.  Neurologic: No focal deficit, alert and oriented x3  Psychiatric: Anxious affect, tearful at times.    Assessment, chronic pain is included overlapping pain syndromes with a previous history of ankle surgery, being evaluated now considering revision.  Describes significant shoulder pain limiting her activity, for which she is now working with [facility].    Has component of cervical genic headache, limited benefit occipital nerve blocks.  Has not done craniosacral physical therapy.    Comorbid mood disorder, psychosocial stressors for which she is actively working with her psychotherapist.  Has had a  serotonin syndrome medications previously.    Is anticipating new psychopharmacologic evaluation.    Plan as above.    Total time 45 minutes moderate complexity decision      Total time spent:  minutes

## 2023-08-31 NOTE — PATIENT INSTRUCTIONS
[facility] Number:  [phone]  Call with any questions about your care and for scheduling assistance.   Calls are returned Monday through Friday between 8 AM and 4:30 PM. We usually get back to you within 2 business days depending on the issue/request.    If we are prescribing your medications:  For opioid medication refills, call the clinic or send a Sporhart message 7 days in advance.  Please include:  Name of requested medication  Name of the pharmacy.  For non-opioid medications, call your pharmacy directly to request a refill. Please allow 3-4 days to be processed.   Per [place] State Law:  All controlled substance prescriptions must be filled within 30 days of being written.    For those controlled substances allowing refills, pickup must occur within 30 days of last fill.      We believe regular attendance is key to your success in our program!    Any time you are unable to keep your appointment we ask that you call us at least 24 hours in advance to cancel.This will allow us to offer the appointment time to another patient.   Multiple missed appointments may lead to dismissal from the clinic.     PLAN:    Referral made for craniosacral therapy to help with headaches related neck pain.    Discussed putting 2 tennis balls into a tube sock and placing under the back of your scalp while you lie down to help with headaches.  May also look into obtaining a foam roller to help with the pain the top of your neck reviewed your previous surgery.    Change hydrocodone to oxycodone 5 mg 4 times a day as needed for pain, call Dr. Dubois in 10 to 12 days with an update for refills.    Your planning surgery for your ankle.  You may then look into surgery for your shoulders.    You are scheduled to meet for a new psychopharmacology evaluation next month.    You are working with the clinic to help with weight loss.    Renewed the ropinirole 2 mg 3 times a day for restless  leg syndrome, when you establish with a new primary care provider will ask them to take over managing.    We will discuss with your new primary care provider concerns for dizziness.    Continue working with your psychotherapist.    Follow-up with Dr. Dubois in 8 weeks

## 2023-09-05 ENCOUNTER — TRANSFERRED RECORDS (OUTPATIENT)
Dept: HEALTH INFORMATION MANAGEMENT | Facility: CLINIC | Age: 68
End: 2023-09-05
Payer: MEDICARE

## 2023-09-05 ENCOUNTER — VIRTUAL VISIT (OUTPATIENT)
Dept: PSYCHOLOGY | Facility: CLINIC | Age: 68
End: 2023-09-05
Payer: MEDICARE

## 2023-09-05 DIAGNOSIS — F41.1 GENERALIZED ANXIETY DISORDER: ICD-10-CM

## 2023-09-05 DIAGNOSIS — F31.81 BIPOLAR 2 DISORDER (H): Primary | ICD-10-CM

## 2023-09-05 DIAGNOSIS — F43.9 TRAUMA AND STRESSOR-RELATED DISORDER: ICD-10-CM

## 2023-09-05 PROCEDURE — 90837 PSYTX W PT 60 MINUTES: CPT | Mod: VID | Performed by: SOCIAL WORKER

## 2023-09-05 ASSESSMENT — ANXIETY QUESTIONNAIRES
1. FEELING NERVOUS, ANXIOUS, OR ON EDGE: NEARLY EVERY DAY
4. TROUBLE RELAXING: MORE THAN HALF THE DAYS
GAD7 TOTAL SCORE: 15
7. FEELING AFRAID AS IF SOMETHING AWFUL MIGHT HAPPEN: MORE THAN HALF THE DAYS
6. BECOMING EASILY ANNOYED OR IRRITABLE: NEARLY EVERY DAY
2. NOT BEING ABLE TO STOP OR CONTROL WORRYING: MORE THAN HALF THE DAYS
3. WORRYING TOO MUCH ABOUT DIFFERENT THINGS: SEVERAL DAYS
GAD7 TOTAL SCORE: 15
IF YOU CHECKED OFF ANY PROBLEMS ON THIS QUESTIONNAIRE, HOW DIFFICULT HAVE THESE PROBLEMS MADE IT FOR YOU TO DO YOUR WORK, TAKE CARE OF THINGS AT HOME, OR GET ALONG WITH OTHER PEOPLE: VERY DIFFICULT
5. BEING SO RESTLESS THAT IT IS HARD TO SIT STILL: MORE THAN HALF THE DAYS

## 2023-09-05 NOTE — PROGRESS NOTES
[facility]                                     Progress Note    Patient Name: Randi Cleary  Date: 9/6/23         Service Type: Individual     Session Start Time: 3:35 PM Session End Time: 4:32 PM     Session Length: 57 minutes    Session #: 198    Attendees: Client attended alone    Service Modality:  Video Visit:      Provider verified identity through the following two step process.  Patient provided:  Patient is known previously to provider    Telemedicine Visit: The patient's condition can be safely assessed and treated via synchronous audio and visual telemedicine encounter.      Reason for Telemedicine Visit: Patient convenience (e.g. access to timely appointments / distance to available provider)    Originating Site (Patient Location): Patient's home    Distant Site (Provider Location): [facility] [place]    Consent:  The patient/guardian has verbally consented to: the potential risks and benefits of telemedicine (video visit) versus in person care; bill my insurance or make self-payment for services provided; and responsibility for payment of non-covered services.     Patient would like the video invitation sent by:  My Chart    Mode of Communication:  Video Conference via [facility]    Distant Location (Provider):  On-site    As the provider I attest to compliance with applicable laws and regulations related to telemedicine.      DATA  Extended Session (53+ minutes): PROLONGED SERVICE IN THE OUTPATIENT SETTING REQUIRING DIRECT (FACE-TO-FACE) PATIENT CONTACT BEYOND THE USUAL SERVICE:    - High distress and under complex circumstances.  See Data section for details  Interactive Complexity: No  Crisis: No        Progress Since Last Session (Related to Symptoms / Goals / Homework):   Symptoms:  Patient is feeling stressed with constant appointments and medical needs      Homework:  Progress made  Focus on your needs  Ask Grace about  records  Ask about hand doctor- PT appts.  Schedule surgery with Efrainjenniemarion -done October 4th  Contact couples counselor at [phone]        Episode of Care Goals: Satisfactory progress - ACTION (Actively working towards change); Intervened by reinforcing change plan / affirming steps taken     Current / Ongoing Stressors and Concerns:   Patient is currently socially isolated. She has a conflictual relationship with her .  She is getting minimal physical activity.  She has had several surgeries. Patient's car was just insured.     Treatment Objective(s) Addressed in This Session:   Patient will increase frequency of engaging her in ADLs.  Patient will track and record at least 5 pleasant exchanges with . Patient will be able to identify at least 5 positive traits about her .  Patient will reduce level of depressive and anxious features as evidenced by reduction in score on her CHAVO-7 and PHQ-9 (scores of 15 and 16 at first measurment, respectively).     Intervention:   Supportive Therapy:   Discussed stressors at home and with medical concerns. Talked about how she's coping with  her ankle and her shoulder. Also looked at how to prioritize her mental health. Reviewed how she's managing her priorities.    The following assessments were completed by patient for this visit:  PHQ9:       7/21/2023    10:32 AM 8/1/2023     1:08 PM 8/7/2023    12:59 PM 8/9/2023     8:30 AM 8/16/2023     9:45 AM 8/21/2023     8:11 AM 9/5/2023     1:27 PM   PHQ-9 SCORE   PHQ-9 Total Score MyChart 18 (Moderately severe depression) 19 (Moderately severe depression) 21 (Severe depression) 19 (Moderately severe depression) 17 (Moderately severe depression) 13 (Moderate depression) 19 (Moderately severe depression)   PHQ-9 Total Score 18 19 21 19 17 13 19     GAD7:       6/6/2023    10:44 AM 6/22/2023     9:58 AM 7/6/2023     9:11 AM 7/21/2023    10:34 AM 8/7/2023     1:03 PM 8/21/2023    11:07 AM 9/5/2023     1:30 PM   CHAVO-7  SCORE   Total Score 12 (moderate anxiety) 17 (severe anxiety) 15 (severe anxiety) 18 (severe anxiety) 18 (severe anxiety) 13 (moderate anxiety) 15 (severe anxiety)   Total Score 12 17    17 15    15 18 18    18 13 15     PROMIS 10-Global Health (only subscores and total score):       5/5/2023     8:12 AM 6/8/2023    12:05 PM 6/22/2023    10:03 AM 7/6/2023     9:12 AM 7/21/2023    10:36 AM 7/28/2023     3:24 PM 8/7/2023     1:07 PM   PROMIS-10 Scores Only   Global Mental Health Score 6 6    6 5    5 5    5 5    5  6    6   Global Physical Health Score 9 9    9 7    7 8    8 7    7  8    8   PROMIS TOTAL - SUBSCORES 15 15    15 12    12 13    13 12    12  14    14       Information is confidential and restricted. Go to Review Flowsheets to unlock data.    Multiple values from one day are sorted in reverse-chronological order        ASSESSMENT: Current Emotional / Mental Status (status of significant symptoms):   Risk status (Self / Other harm or suicidal ideation)   Patient denies current fears or concerns for personal safety.   Patient denies current or recent suicidal ideation or behaviors.   Patient denies current or recent homicidal ideation or behaviors.   Patient denies current or recent self injurious behavior or ideation.   Patient denies other safety concerns.   Patient reports there has been no change in risk factors since their last session.     Patient reports there has been no change in protective factors since their last session.     A safety and risk management plan has been developed including: Patient consented to co-developed safety plan on 11/24/2020, updated 2/20/23.  Safety and risk management plan was reviewed.   Patient agreed to use safety plan should any safety concerns arise.  A copy was made available to the patient.     Appearance:   Appropriate    Eye Contact:   Good    Psychomotor Behavior: Normal    Attitude:   Cooperative    Orientation:   All   Speech    Rate / Production: Normal/  Responsive    Volume:  Normal    Mood:    Anxious    Affect:    Appropriate    Thought Content:  Clear    Thought Form:  Coherent    Insight:    Good      Medication Review:   No changes to current psychiatric medication(s)     Medication Compliance:   Yes     Changes in Health Issues:   None reported     Chemical Use Review:   Substance Use: Chemical use reviewed, no active concerns identified Nothing used since 2021.     Tobacco Use: No current tobacco use.      Diagnosis:  1. Bipolar 2 disorder (H)    2. Generalized anxiety disorder    3. Trauma and stressor-related disorder            Collateral Reports Completed:   Not Applicable    PLAN: (Patient Tasks / Therapist Tasks / Other)  Focus on your needs  Ask Grace about records  Ask about hand doctor- PT appts.  Contact couples counselor at [phone]         There has been demonstrated improvement in functioning while patient has been engaged in psychotherapy/psychological service- if withdrawn the patient would deteriorate and/or relapse.     MICAELA SLADE, [facility]   2023                                                        ______________________________________________________________________    Individual Treatment Plan    Patient's Name: Randi Cleary  YOB: 1955    Date of Creation: 20  Date Treatment Plan Last Reviewed/Revised: 23    DSM5 Diagnoses: 296.89 Bipolar II Disorder Depressed, 300.02 (F41.1) Generalized Anxiety Disorder, or Adjustment Disorders  309.89 (F43.8) Other Specified Trauma and Stressor Related Disorder  Psychosocial / Contextual Factors: Patient's entire family of origin has , she now has a sister-in-law and  as support.  Relationship with  is conflictual. She is recovering from surgeries  PROMIS (reviewed every 90 days): PROMIS-10 Scores  PROMIS 10-Global Health (only subscores and total score):   PROMIS-10 Scores Only 2021 3/15/2022 3/15/2022 3/15/2022  "3/24/2022 4/1/2022 6/9/2022   Global Mental Health Score 6 6 6 6 8 12 12   Global Physical Health Score 9 9 9 9 8 11 10   PROMIS TOTAL - SUBSCORES 15 15 15 15 16 23 22       Referral / Collaboration:  Referral to another professional/service is not indicated at this time..    Anticipated number of session for this episode of care: 50  Anticipation frequency of session: Biweekly  Anticipated Duration of each session: 53 or more minutes due to intensity of trauma symptoms  Treatment plan will be reviewed in 90 days or when goals have been changed.   There has been demonstrated improvement in functioning while patient has been engaged in psychotherapy/psychological service- if withdrawn the patient would deteriorate and/or relapse.       MeasurableTreatment Goal(s) related to diagnosis / functional impairment(s)  Goal 1: Patient will \"jumpstart, getting going with the things I need to be doing around the house as far as picking up, doing things, trying to do something every day.  Also to lessen the animosity between me and my .\"    I will know I've met my goal when my shoulders are fixed and I can see.      Objective #A (Patient Action)    Patient will  increase frequency of engaging her in ADLs .  Status: Continued - Date(s): 12/10/21, 3/9/22, 6/9/22, 9/2/22, 12/1/22, 3/2/23, 6/6/23    Intervention(s)  Therapist will  engage patient in CBT, specifically behavioral activation .    Objective #B  Patient will   track and record at least 5 pleasant exchanges with . Patient will be able to identify at least 5 positive traits about her  and how he relates to her .  Status: Continued - Date(s): 12/10/21, 3/9/22, 6/9/22, 9/2/22, 12/1/22, 3/2/23, 6/6/23    Intervention(s)  Therapist will  teach assertiveness skills and assign homework related to relationship interactions .    Objective #C  Patient will  reduce level of depressive and anxious features as evidenced by reduction in score on her CHAVO-7 and " "PHQ-9 (scores of 15 and 16 at first measurment, respectively) .  Status: Continued - Date(s): 12/10/21, 3/9/22, 6/9/22, 9/2/22, 12/1/22, 3/2/23, 6/6/23    Intervention(s)  Therapist will  engage patient in person-centered therapy and CBT .    Patient has reviewed and agreed to the above plan.      MICAELA SLADE, [facility]  September 5, 2023                                                   Randi Cleary          SAFETY PLAN:  Step 1: Warning signs / cues (Thoughts, images, mood, situation, behavior) that a crisis may be developing:  Thoughts: \"I don't want to continue\" \"I am unwanted\"  Images: none  Thinking Processes: ruminating  Mood: anger  Behaviors: isolating/withdrawing , can't stop crying, not taking care of myself and not taking care of my responsibilities  Situations: small triggers, such as not being able to find something, or dropping something   Step 2: Coping strategies - Things I can do to take my mind off of my problems without contacting another person (relaxation technique, physical activity):  Distress Tolerance Strategies:  arts and crafts: drawing, play with my pet , listen to positive and upbeat music: any, change body temperature (ice pack/cold water)  and paced breathing/progressive muscle relaxation  Physical Activities: go for a walk, deep breathing and stretching   Focus on helpful thoughts:  \"You've been through this before, you can get through it again.\"  Step 3: People and social settings that provide distraction:                 Name: Carmen                            Name: Darien                           Name: Aleida       pool, shopping, Carmen's house, [employer]       Step 4: Remind myself of people and things that are important to me and worth living for:  Clifford Little Donna, post-COVID world, options of what could be in your future        Step 5: When I am in crisis, I can ask these people to help me use my safety plan:                 Name: Sidney  Step 6: Making the environment " safe:   go to sleep/daydream  Step 7: Professionals or agencies I can contact during a crisis:  [facility] Daytime Number: [phone]  Suicide Prevention Lifeline: 9-226-547-DLDA (1616)  Crisis Text Line Service (available 24 hours a day, 7 days a week): Text [place] to 379142  Local Crisis Services: [facility] Crisis: [phone]  Adults can always access to the emPATH unit at [facility] (no phone number, utilize it like an urgent care or ER where you just show up)     Call 911 or go to my nearest emergency department.       I helped develop this safety plan and agree to use it when needed.  I have been given a copy of this plan.       Client signature _________________________________________________________________  Today s date:  11/24/2020  Adapted from Safety Plan Template 2008 Randi Poole and Robby Barba is reprinted with the express permission of the authors.  No portion of the Safety Plan Template may be reproduced without the express, written permission.  You can contact the authors at [email or web address] or [email or web address].

## 2023-09-06 ENCOUNTER — PATIENT OUTREACH (OUTPATIENT)
Dept: ONCOLOGY | Facility: CLINIC | Age: 68
End: 2023-09-06
Payer: MEDICARE

## 2023-09-06 ENCOUNTER — HOSPITAL ENCOUNTER (OUTPATIENT)
Dept: BEHAVIORAL HEALTH | Facility: CLINIC | Age: 68
Discharge: HOME | End: 2023-09-06
Attending: PSYCHIATRY & NEUROLOGY
Payer: MEDICARE

## 2023-09-06 PROCEDURE — 90853 GROUP PSYCHOTHERAPY: CPT | Performed by: SOCIAL WORKER

## 2023-09-06 NOTE — GROUP NOTE
Psychotherapy Group Note    PATIENT'S NAME: Randi Cleary  MRN:   [id number]  :   1955  ACCT. NUMBER: [id number]  DATE OF SERVICE: 23  START TIME:  2:00 PM  END TIME:  2:50 PM  FACILITATOR: Pamela Cabrera LICSW  TOPIC: MH EBP Group: Coping Skills  [facility] Day Treatment  TRACK: [facility]    NUMBER OF PARTICIPANTS: 3    Summary of Group / Topics Discussed:  Coping Skills: Improve the Moment: Patients learned to tolerate distress, by applying strategies to effect positive change in the present moment.  Patients will identified situations where they would benefit from applying strategies to improve the moment and reduce distress. Patients discussed how to distinguish when this can be useful in their lives or when other strategies would be more relevant or helpful.    Patient Session Goals / Objectives:  Discuss how the use of intentional  in the moment  actions can help reduce distress.  Review patients current practices and discuss a more formal way of practicing and accessing skills.  Increase ability to decide when to use improve the moment strategies  Choose 1-2 in the moment actions to apply during times of distress.      Patient Participation / Response:  Fully participated with the group by sharing personal reflections / insights and openly received / provided feedback with other participants.    Demonstrated knowledge of when to consider using a variety of coping skills in daily life and Identified barriers to applying coping skills    Treatment Plan:  Patient has a current master individualized treatment plan.  See Epic treatment plan for more information.    ASHKAN Hector

## 2023-09-06 NOTE — PROGRESS NOTES
[facility]: Cancer Care                                                                                          My chart message received from pt this am asking if she can be rescheduled for her phlebotomy in Sept instead of late October due to plan for knee surgery.  Writer instructed pt via my chart to call scheduling to reschedule.    Pt then calling writer to see if phlebotomy on 9/25 and surgery 10/4 would be too close together.  Educated pt that its 1.5 weeks apart and this is ok.  Pt asking about labs needed for phlebotomy, educated its hgb, hematocrit and ferritin.  Pt needs labs for pre-op, educated that if her PCP puts in the lab orders prior to 9/26 lab appt, they should be able to draw all labs at infusion appt.  Pt states understanding but will wait for her preop labs.  Pt reports that she has new  for her requip and had increased restless legs last night, wondering if it was the change in requip.  Educated her to talk with Dr Dubois who prescribed requip.    Sowmya Hernandez, CORALN, RN  RN Care Coordinator  [facility]

## 2023-09-06 NOTE — GROUP NOTE
Process Group Note    PATIENT'S NAME: Randi Cleary  MRN:   [id number]  :   1955  ACCT. NUMBER: [id number]  DATE OF SERVICE: 23  START TIME:  1:00 PM  END TIME:  1:50 PM  FACILITATOR: Pamela Cabrera [facility]  TOPIC:  Process Group    Diagnoses:  296.33 (F33.2) Major Depressive Disorder, Recurrent Episode, Severe With anxious distress  300.02 (F41.1) Generalized Anxiety Disorder    Rule out:  296.89 Bipolar II Disorder vs. 314.01 (F90.2) Attention-Deficit/Hyperactivity Disorder   Combined presentation       [facility] Day Treatment  TRACK: [facility]    NUMBER OF PARTICIPANTS: 3        Data:    Session content: At the start of this group, patients were invited to check in by identifying themselves, describing their current emotional status, and identifying issues to address in this group.   Area(s) of treatment focus addressed in this session included Symptom Management, Personal Safety, and Community Resources/Discharge Planning.  Winnie took time to report seeing the orthopeadic provider to review foot pain after former surgeries.  She shared that she will have a procedure on  that is anticipated to help decrease pain and improve mobility.  She reported having depressed, anxious, and angry mood over the weekend.   She reported feeling angry and rageful.  She shared that she ordered new pans for cooking as the previous pans were for making food for a large group of people.  She shared a story about pulling out an under counter bread cutting board and throwing it down the evans due to rage.  She shared that she has the intake appointment with the resident psychiatry at the end of September.  Client denied suicidal ideation, intent and plan.  She is currently not taking antidepressants.      Therapeutic Interventions/Treatment Strategies:  Psychotherapist offered support, feedback and validation and reinforced use of skills. Treatment modalities used include  "Cognitive Behavioral Therapy. Interventions include Coping Skills: Discussed how the use of intentional \"in the moment\" actions can help reduce distress and Addressed barriers to utilizing coping skills when in distress.    Assessment:    Patient response:   Patient responded to session by accepting feedback, giving feedback, and listening    Possible barriers to participation / learning include: and no barriers identified    Health Issues:   None reported       Substance Use Review:   Substance Use: No active concerns identified.    Mental Status/Behavioral Observations  Appearance:   Appropriate   Eye Contact:   Good   Psychomotor Behavior: Normal   Attitude:   Cooperative  Interested  Orientation:   All  Speech   Rate / Production: Normal    Volume:  Normal   Mood:    Anxious  Depressed   Affect:    Appropriate   Thought Content:   Clear  Thought Form:  Coherent  Logical     Insight:    Good     Plan:   Safety Plan: No current safety concerns identified.  Recommended that patient call 911 or go to the local ED should there be a change in any of these risk factors.   Barriers to treatment: None identified  Patient Contracts (see media tab):  None  Substance Use: Not addressed in session   Continue or Discharge: Patient will continue in Adult Day Treatment (ADT)  as planned. Patient is likely to benefit from learning and using skills as they work toward the goals identified in their treatment plan.      Pamela Cabrera, [facility]  September 6, 2023  "

## 2023-09-06 NOTE — TELEPHONE ENCOUNTER
----- Message from Pamela Cabrera LICSW sent at 9/6/2023  9:54 AM CDT -----  Regarding: Addtional sessions needed  Hi UR and Day Treatment teams,  Scheduling Request    Patient Name: Randi RIVERA Cathy Cleary  Location of programming: Adult Day Treatment  Start Date: September / 27/ 2023  Group: [facility] One on Wednesday at 01:00 PM to 03:00 PM  Attending Provider (MD): Thiago  Number of visits to be scheduled: 1          Duration of Appointment in minutes: 120  Visit Type: In-person or Treatment - 870    Additional notes: Please add one additional session on 9/27/23.   Thanks!  Pamela Cabrera

## 2023-09-12 ENCOUNTER — TRANSFERRED RECORDS (OUTPATIENT)
Dept: HEALTH INFORMATION MANAGEMENT | Facility: CLINIC | Age: 68
End: 2023-09-12
Payer: MEDICARE

## 2023-09-13 ENCOUNTER — VIRTUAL VISIT (OUTPATIENT)
Dept: PSYCHOLOGY | Facility: CLINIC | Age: 68
End: 2023-09-13
Payer: MEDICARE

## 2023-09-13 ENCOUNTER — TELEPHONE (OUTPATIENT)
Dept: BEHAVIORAL HEALTH | Facility: CLINIC | Age: 68
End: 2023-09-13
Payer: MEDICARE

## 2023-09-13 DIAGNOSIS — F41.1 GENERALIZED ANXIETY DISORDER: ICD-10-CM

## 2023-09-13 DIAGNOSIS — F31.81 BIPOLAR 2 DISORDER (H): Primary | ICD-10-CM

## 2023-09-13 DIAGNOSIS — F43.9 TRAUMA AND STRESSOR-RELATED DISORDER: ICD-10-CM

## 2023-09-13 PROCEDURE — 90837 PSYTX W PT 60 MINUTES: CPT | Mod: VID | Performed by: SOCIAL WORKER

## 2023-09-13 NOTE — TELEPHONE ENCOUNTER
----- Message from Chilo MILES Jarvis sent at 9/13/2023  3:22 PM CDT -----  Regarding: Add An Appt for Today  Hi [facility] team,     Please add an appt back on today's ALYSSA. Patient canceled their appt via [employer] but appt is needed for program staff to chart attendance. Thanks         Patient Name: Winnie Cleary  Location of programming: [facility]   Start Date: 6/1/23   Group: Adult Clinic 1: Wed at 1pm to 3pm   Attending Provider: Roland Das   Number of visits to be scheduled: 1   Duration of Appointment in minutes: 180 minutes   Visit Type: Treatment [870]

## 2023-09-13 NOTE — PROGRESS NOTES
[facility]                                     Progress Note    Patient Name: Randi Cleary  Date: 9/13/23         Service Type: Individual     Session Start Time: 11:01 AM Session End Time: 11:59 AM     Session Length: 58 minutes    Session #: 199    Attendees: Client attended alone    Service Modality:  Video Visit:      Provider verified identity through the following two step process.  Patient provided:  Patient is known previously to provider    Telemedicine Visit: The patient's condition can be safely assessed and treated via synchronous audio and visual telemedicine encounter.      Reason for Telemedicine Visit: Patient convenience (e.g. access to timely appointments / distance to available provider)    Originating Site (Patient Location): Patient's home    Distant Site (Provider Location): Provider Remote Setting- Home Office    Consent:  The patient/guardian has verbally consented to: the potential risks and benefits of telemedicine (video visit) versus in person care; bill my insurance or make self-payment for services provided; and responsibility for payment of non-covered services.     Patient would like the video invitation sent by:  My Chart    Mode of Communication:  [facility]    Distant Location (Provider):  Off-site    As the provider I attest to compliance with applicable laws and regulations related to telemedicine.      DATA  Extended Session (53+ minutes): PROLONGED SERVICE IN THE OUTPATIENT SETTING REQUIRING DIRECT (FACE-TO-FACE) PATIENT CONTACT BEYOND THE USUAL SERVICE:    - High distress and under complex circumstances.  See Data section for details  Interactive Complexity: No  Crisis: No        Progress Since Last Session (Related to Symptoms / Goals / Homework):   Symptoms:  Patient is feeling stressed with constant appointments and medical needs      Homework:  Progress made  Focus on your needs -done  Ask Grace about records -not done  Ask  about hand doctor- PT appts. -done  Contact couples counselor at [phone] -not done       Episode of Care Goals: Satisfactory progress - ACTION (Actively working towards change); Intervened by reinforcing change plan / affirming steps taken     Current / Ongoing Stressors and Concerns:   Patient is currently socially isolated. She has a conflictual relationship with her .  She is getting minimal physical activity.  She has had several surgeries. Patient's car was just insured.     Treatment Objective(s) Addressed in This Session:   Patient will increase frequency of engaging her in ADLs.  Patient will track and record at least 5 pleasant exchanges with . Patient will be able to identify at least 5 positive traits about her .  Patient will reduce level of depressive and anxious features as evidenced by reduction in score on her CHAVO-7 and PHQ-9 (scores of 15 and 16 at first measurment, respectively).     Intervention:   Supportive Therapy:   Processed patient's medical concerns and upcoming surgical options, helped patient talk through how she was prioritizing them. Discussed her foot first, then hand, then shoulder. Processed financial frustrations.     The following assessments were completed by patient for this visit:  PHQ9:       8/1/2023     1:08 PM 8/7/2023    12:59 PM 8/9/2023     8:30 AM 8/16/2023     9:45 AM 8/21/2023     8:11 AM 9/5/2023     1:27 PM 9/13/2023    10:57 AM   PHQ-9 SCORE   PHQ-9 Total Score MyChart 19 (Moderately severe depression) 21 (Severe depression) 19 (Moderately severe depression) 17 (Moderately severe depression) 13 (Moderate depression) 19 (Moderately severe depression) 17 (Moderately severe depression)   PHQ-9 Total Score 19 21 19 17 13 19 17     GAD7:       6/6/2023    10:44 AM 6/22/2023     9:58 AM 7/6/2023     9:11 AM 7/21/2023    10:34 AM 8/7/2023     1:03 PM 8/21/2023    11:07 AM 9/5/2023     1:30 PM   CHAVO-7 SCORE   Total Score 12 (moderate anxiety) 17  (severe anxiety) 15 (severe anxiety) 18 (severe anxiety) 18 (severe anxiety) 13 (moderate anxiety) 15 (severe anxiety)   Total Score 12 17    17 15    15 18 18    18 13 15     PROMIS 10-Global Health (only subscores and total score):       5/5/2023     8:12 AM 6/8/2023    12:05 PM 6/22/2023    10:03 AM 7/6/2023     9:12 AM 7/21/2023    10:36 AM 7/28/2023     3:24 PM 8/7/2023     1:07 PM   PROMIS-10 Scores Only   Global Mental Health Score 6 6    6 5    5 5    5 5    5  6    6   Global Physical Health Score 9 9    9 7    7 8    8 7    7  8    8   PROMIS TOTAL - SUBSCORES 15 15    15 12    12 13    13 12    12  14    14       Information is confidential and restricted. Go to Review Flowsheets to unlock data.    Multiple values from one day are sorted in reverse-chronological order        ASSESSMENT: Current Emotional / Mental Status (status of significant symptoms):   Risk status (Self / Other harm or suicidal ideation)   Patient denies current fears or concerns for personal safety.   Patient denies current or recent suicidal ideation or behaviors.   Patient denies current or recent homicidal ideation or behaviors.   Patient denies current or recent self injurious behavior or ideation.   Patient denies other safety concerns.   Patient reports there has been no change in risk factors since their last session.     Patient reports there has been no change in protective factors since their last session.     A safety and risk management plan has been developed including: Patient consented to co-developed safety plan on 11/24/2020, updated 2/20/23.  Safety and risk management plan was reviewed.   Patient agreed to use safety plan should any safety concerns arise.  A copy was made available to the patient.     Appearance:   Appropriate    Eye Contact:   Good    Psychomotor Behavior: Normal    Attitude:   Cooperative    Orientation:   All   Speech    Rate / Production: Normal/ Responsive    Volume:  Normal    Mood:    Anxious     Affect:    Appropriate    Thought Content:  Clear    Thought Form:  Coherent    Insight:    Good      Medication Review:   No changes to current psychiatric medication(s)     Medication Compliance:   Yes     Changes in Health Issues:   None reported     Chemical Use Review:   Substance Use: Chemical use reviewed, no active concerns identified Nothing used since 2021.     Tobacco Use: No current tobacco use.      Diagnosis:  1. Bipolar 2 disorder (H)    2. Generalized anxiety disorder    3. Trauma and stressor-related disorder      Collateral Reports Completed:   Not Applicable    PLAN: (Patient Tasks / Therapist Tasks / Other)  Schedule shoulder surgery/see [facility]  Follow up with endocrinologist  Find records from past psychiatrist  Contact couples counselor at [phone]         There has been demonstrated improvement in functioning while patient has been engaged in psychotherapy/psychological service- if withdrawn the patient would deteriorate and/or relapse.     MICAELA SLADE, [facility]   2023                                                        ______________________________________________________________________    Individual Treatment Plan    Patient's Name: Randi Cleary  YOB: 1955    Date of Creation: 20  Date Treatment Plan Last Reviewed/Revised: 23    DSM5 Diagnoses: 296.89 Bipolar II Disorder Depressed, 300.02 (F41.1) Generalized Anxiety Disorder, or Adjustment Disorders  309.89 (F43.8) Other Specified Trauma and Stressor Related Disorder  Psychosocial / Contextual Factors: Patient's entire family of origin has , she now has a sister-in-law and  as support.  Relationship with  is conflictual. She is recovering from surgeries  PROMIS (reviewed every 90 days): PROMIS-10 Scores  PROMIS 10-Global Health (only subscores and total score):   PROMIS-10 Scores Only 2021 3/15/2022 3/15/2022 3/15/2022 3/24/2022 2022 2022  "  Global Mental Health Score 6 6 6 6 8 12 12   Global Physical Health Score 9 9 9 9 8 11 10   PROMIS TOTAL - SUBSCORES 15 15 15 15 16 23 22       Referral / Collaboration:  Referral to another professional/service is not indicated at this time..    Anticipated number of session for this episode of care: 50  Anticipation frequency of session: Biweekly  Anticipated Duration of each session: 53 or more minutes due to intensity of trauma symptoms  Treatment plan will be reviewed in 90 days or when goals have been changed.   There has been demonstrated improvement in functioning while patient has been engaged in psychotherapy/psychological service- if withdrawn the patient would deteriorate and/or relapse.       MeasurableTreatment Goal(s) related to diagnosis / functional impairment(s)  Goal 1: Patient will \"jumpstart, getting going with the things I need to be doing around the house as far as picking up, doing things, trying to do something every day.  Also to lessen the animosity between me and my .\"    I will know I've met my goal when my shoulders are fixed and I can see.      Objective #A (Patient Action)    Patient will  increase frequency of engaging her in ADLs .  Status: Continued - Date(s): 12/10/21, 3/9/22, 6/9/22, 9/2/22, 12/1/22, 3/2/23, 6/6/23, 9/13/23    Intervention(s)  Therapist will  engage patient in CBT, specifically behavioral activation .    Objective #B  Patient will   track and record at least 5 pleasant exchanges with . Patient will be able to identify at least 5 positive traits about her  and how he relates to her .  Status: Continued - Date(s): 12/10/21, 3/9/22, 6/9/22, 9/2/22, 12/1/22, 3/2/23, 6/6/23, 9/13/23    Intervention(s)  Therapist will  teach assertiveness skills and assign homework related to relationship interactions .    Objective #C  Patient will  reduce level of depressive and anxious features as evidenced by reduction in score on her CHAVO-7 and PHQ-9 (scores " "of 15 and 16 at first measurment, respectively) .  Status: Continued - Date(s): 12/10/21, 3/9/22, 6/9/22, 9/2/22, 12/1/22, 3/2/23, 6/6/23, 9/13/23    Intervention(s)  Therapist will  engage patient in person-centered therapy and CBT .    Patient has reviewed and agreed to the above plan.      MICAELA SLADE, [facility]  September 13, 2023                                                   Randi Cleary          SAFETY PLAN:  Step 1: Warning signs / cues (Thoughts, images, mood, situation, behavior) that a crisis may be developing:  Thoughts: \"I don't want to continue\" \"I am unwanted\"  Images: none  Thinking Processes: ruminating  Mood: anger  Behaviors: isolating/withdrawing , can't stop crying, not taking care of myself and not taking care of my responsibilities  Situations: small triggers, such as not being able to find something, or dropping something   Step 2: Coping strategies - Things I can do to take my mind off of my problems without contacting another person (relaxation technique, physical activity):  Distress Tolerance Strategies:  arts and crafts: drawing, play with my pet , listen to positive and upbeat music: any, change body temperature (ice pack/cold water)  and paced breathing/progressive muscle relaxation  Physical Activities: go for a walk, deep breathing and stretching   Focus on helpful thoughts:  \"You've been through this before, you can get through it again.\"  Step 3: People and social settings that provide distraction:                 Name: Camren                            Name: Darien                           Name: Aleida       pool, shopping, Carmen's house, [employer]       Step 4: Remind myself of people and things that are important to me and worth living for:  Clifford Little Donna, post-COVID world, options of what could be in your future        Step 5: When I am in crisis, I can ask these people to help me use my safety plan:                 Name: Sidney  Step 6: Making the environment safe: "   go to sleep/daydream  Step 7: Professionals or agencies I can contact during a crisis:  [facility] Daytime Number: [phone]  Suicide Prevention Lifeline: 1-120-830-BPFW (2162)  Crisis Text Line Service (available 24 hours a day, 7 days a week): Text [place] to 622086  Local Crisis Services: [facility] Crisis: [phone]  Adults can always access to the emPATH unit at [facility] (no phone number, utilize it like an urgent care or ER where you just show up)     Call 911 or go to my nearest emergency department.       I helped develop this safety plan and agree to use it when needed.  I have been given a copy of this plan.       Client signature _________________________________________________________________  Today s date:  11/24/2020  Adapted from Safety Plan Template 2008 Randi Poole and Robby Barba is reprinted with the express permission of the authors.  No portion of the Safety Plan Template may be reproduced without the express, written permission.  You can contact the authors at [email or web address] or [email or web address].

## 2023-09-15 ENCOUNTER — VIRTUAL VISIT (OUTPATIENT)
Dept: PSYCHOLOGY | Facility: CLINIC | Age: 68
End: 2023-09-15
Payer: MEDICARE

## 2023-09-15 DIAGNOSIS — F43.9 TRAUMA AND STRESSOR-RELATED DISORDER: ICD-10-CM

## 2023-09-15 DIAGNOSIS — F41.1 GENERALIZED ANXIETY DISORDER: ICD-10-CM

## 2023-09-15 DIAGNOSIS — F31.81 BIPOLAR 2 DISORDER (H): Primary | ICD-10-CM

## 2023-09-15 PROCEDURE — 90837 PSYTX W PT 60 MINUTES: CPT | Mod: VID | Performed by: SOCIAL WORKER

## 2023-09-15 NOTE — PROGRESS NOTES
[facility]                                     Progress Note    Patient Name: Randi Cleary  Date: 9/15/23         Service Type: Individual     Session Start Time: 11:05 AM Session End Time: 11:59 AM     Session Length: 54 minutes    Session #: 200    Attendees: Client attended alone    Service Modality:  Video Visit:      Provider verified identity through the following two step process.  Patient provided:  Patient is known previously to provider    Telemedicine Visit: The patient's condition can be safely assessed and treated via synchronous audio and visual telemedicine encounter.      Reason for Telemedicine Visit: Patient convenience (e.g. access to timely appointments / distance to available provider)    Originating Site (Patient Location): Patient's home    Distant Site (Provider Location): Provider Remote Setting- Home Office    Consent:  The patient/guardian has verbally consented to: the potential risks and benefits of telemedicine (video visit) versus in person care; bill my insurance or make self-payment for services provided; and responsibility for payment of non-covered services.     Patient would like the video invitation sent by:  My Chart    Mode of Communication:  Video Conference via [facility]    Distant Location (Provider):  Off-site    As the provider I attest to compliance with applicable laws and regulations related to telemedicine.      DATA  Extended Session (53+ minutes): PROLONGED SERVICE IN THE OUTPATIENT SETTING REQUIRING DIRECT (FACE-TO-FACE) PATIENT CONTACT BEYOND THE USUAL SERVICE:    - High distress and under complex circumstances.  See Data section for details  Interactive Complexity: No  Crisis: No        Progress Since Last Session (Related to Symptoms / Goals / Homework):   Symptoms:  Patient is continuing to feel stressed.      Homework:  Progress made  Schedule shoulder surgery/see Vincent -not done  Follow up with endocrinologist -done  Find records  from past psychiatrist -not done  Contact couples counselor at [phone] -not done       Episode of Care Goals: Satisfactory progress - ACTION (Actively working towards change); Intervened by reinforcing change plan / affirming steps taken     Current / Ongoing Stressors and Concerns:   Patient is currently socially isolated. She has a conflictual relationship with her .  She is getting minimal physical activity.  She has had several surgeries. Patient's car was just insured.     Treatment Objective(s) Addressed in This Session:   Patient will increase frequency of engaging her in ADLs.  Patient will track and record at least 5 pleasant exchanges with . Patient will be able to identify at least 5 positive traits about her .  Patient will reduce level of depressive and anxious features as evidenced by reduction in score on her CHAVO-7 and PHQ-9 (scores of 15 and 16 at first measurment, respectively).     Intervention:   Supportive Therapy:   Reviewed homework and identified successes and barriers to completion.  Reviewed next steps with her medication situation. Patient moved from topic to topic, briefly sharing where she's a with each (health, bedroom, relationship, finances). Talked about how she's frustrated that she can't seem to stay on one topic; looked at how she is making progress in each and it is ok to carry them each briefly.      The following assessments were completed by patient for this visit:  PHQ9:       8/1/2023     1:08 PM 8/7/2023    12:59 PM 8/9/2023     8:30 AM 8/16/2023     9:45 AM 8/21/2023     8:11 AM 9/5/2023     1:27 PM 9/13/2023    10:57 AM   PHQ-9 SCORE   PHQ-9 Total Score MyChart 19 (Moderately severe depression) 21 (Severe depression) 19 (Moderately severe depression) 17 (Moderately severe depression) 13 (Moderate depression) 19 (Moderately severe depression) 17 (Moderately severe depression)   PHQ-9 Total Score 19 21 19 17 13 19 17     GAD7:       6/6/2023     10:44 AM 6/22/2023     9:58 AM 7/6/2023     9:11 AM 7/21/2023    10:34 AM 8/7/2023     1:03 PM 8/21/2023    11:07 AM 9/5/2023     1:30 PM   CHAVO-7 SCORE   Total Score 12 (moderate anxiety) 17 (severe anxiety) 15 (severe anxiety) 18 (severe anxiety) 18 (severe anxiety) 13 (moderate anxiety) 15 (severe anxiety)   Total Score 12 17    17 15    15 18 18    18 13 15     PROMIS 10-Global Health (only subscores and total score):       5/5/2023     8:12 AM 6/8/2023    12:05 PM 6/22/2023    10:03 AM 7/6/2023     9:12 AM 7/21/2023    10:36 AM 7/28/2023     3:24 PM 8/7/2023     1:07 PM   PROMIS-10 Scores Only   Global Mental Health Score 6 6    6 5    5 5    5 5    5  6    6   Global Physical Health Score 9 9    9 7    7 8    8 7    7  8    8   PROMIS TOTAL - SUBSCORES 15 15    15 12    12 13    13 12    12  14    14       Information is confidential and restricted. Go to Review Flowsheets to unlock data.    Multiple values from one day are sorted in reverse-chronological order        ASSESSMENT: Current Emotional / Mental Status (status of significant symptoms):   Risk status (Self / Other harm or suicidal ideation)   Patient denies current fears or concerns for personal safety.   Patient denies current or recent suicidal ideation or behaviors.   Patient denies current or recent homicidal ideation or behaviors.   Patient denies current or recent self injurious behavior or ideation.   Patient denies other safety concerns.   Patient reports there has been no change in risk factors since their last session.     Patient reports there has been no change in protective factors since their last session.     A safety and risk management plan has been developed including: Patient consented to co-developed safety plan on 11/24/2020, updated 2/20/23.  Safety and risk management plan was reviewed.   Patient agreed to use safety plan should any safety concerns arise.  A copy was made available to the patient.     Appearance:   Appropriate     Eye Contact:   Good    Psychomotor Behavior: Normal    Attitude:   Cooperative    Orientation:   All   Speech    Rate / Production: Normal/ Responsive    Volume:  Normal    Mood:    Anxious    Affect:    Appropriate    Thought Content:  Clear    Thought Form:  Coherent    Insight:    Good      Medication Review:   No changes to current psychiatric medication(s)     Medication Compliance:   Yes     Changes in Health Issues:   None reported     Chemical Use Review:   Substance Use: Chemical use reviewed, no active concerns identified Nothing used since 2021.     Tobacco Use: No current tobacco use.      Diagnosis:  1. Bipolar 2 disorder (H)    2. Generalized anxiety disorder    3. Trauma and stressor-related disorder      Collateral Reports Completed:   Not Applicable    PLAN: (Patient Tasks / Therapist Tasks / Other)  Schedule through [facility]  Schedule shoulder surgery/see Lervick  Follow up on foot insert- ask Lorena if Cde would fill out form for this  Find records from past psychiatrist  Contact couples counselor at [phone]         There has been demonstrated improvement in functioning while patient has been engaged in psychotherapy/psychological service- if withdrawn the patient would deteriorate and/or relapse.     MICAELA SLADE, [facility]   2023                                                        ______________________________________________________________________    Individual Treatment Plan    Patient's Name: Randi Cleary  YOB: 1955    Date of Creation: 20  Date Treatment Plan Last Reviewed/Revised: 23    DSM5 Diagnoses: 296.89 Bipolar II Disorder Depressed, 300.02 (F41.1) Generalized Anxiety Disorder, or Adjustment Disorders  309.89 (F43.8) Other Specified Trauma and Stressor Related Disorder  Psychosocial / Contextual Factors: Patient's entire family of origin has , she now has a sister-in-law and  as support.  Relationship with  " is conflictual. She is recovering from surgeries  PROMIS (reviewed every 90 days): PROMIS-10 Scores  PROMIS 10-Global Health (only subscores and total score):   PROMIS-10 Scores Only 12/14/2021 3/15/2022 3/15/2022 3/15/2022 3/24/2022 4/1/2022 6/9/2022   Global Mental Health Score 6 6 6 6 8 12 12   Global Physical Health Score 9 9 9 9 8 11 10   PROMIS TOTAL - SUBSCORES 15 15 15 15 16 23 22       Referral / Collaboration:  Referral to another professional/service is not indicated at this time..    Anticipated number of session for this episode of care: 50  Anticipation frequency of session: Biweekly  Anticipated Duration of each session: 53 or more minutes due to intensity of trauma symptoms  Treatment plan will be reviewed in 90 days or when goals have been changed.   There has been demonstrated improvement in functioning while patient has been engaged in psychotherapy/psychological service- if withdrawn the patient would deteriorate and/or relapse.       MeasurableTreatment Goal(s) related to diagnosis / functional impairment(s)  Goal 1: Patient will \"jumpstart, getting going with the things I need to be doing around the house as far as picking up, doing things, trying to do something every day.  Also to lessen the animosity between me and my .\"    I will know I've met my goal when my shoulders are fixed and I can see.      Objective #A (Patient Action)    Patient will  increase frequency of engaging her in ADLs .  Status: Continued - Date(s): 12/10/21, 3/9/22, 6/9/22, 9/2/22, 12/1/22, 3/2/23, 6/6/23, 9/13/23    Intervention(s)  Therapist will  engage patient in CBT, specifically behavioral activation .    Objective #B  Patient will   track and record at least 5 pleasant exchanges with . Patient will be able to identify at least 5 positive traits about her  and how he relates to her .  Status: Continued - Date(s): 12/10/21, 3/9/22, 6/9/22, 9/2/22, 12/1/22, 3/2/23, 6/6/23, " "9/13/23    Intervention(s)  Therapist will  teach assertiveness skills and assign homework related to relationship interactions .    Objective #C  Patient will  reduce level of depressive and anxious features as evidenced by reduction in score on her CHAVO-7 and PHQ-9 (scores of 15 and 16 at first measurment, respectively) .  Status: Continued - Date(s): 12/10/21, 3/9/22, 6/9/22, 9/2/22, 12/1/22, 3/2/23, 6/6/23, 9/13/23    Intervention(s)  Therapist will  engage patient in person-centered therapy and CBT .    Patient has reviewed and agreed to the above plan.      MICAELA SLADE, [facility]  September 13, 2023                                                   Randi Cleary          SAFETY PLAN:  Step 1: Warning signs / cues (Thoughts, images, mood, situation, behavior) that a crisis may be developing:  Thoughts: \"I don't want to continue\" \"I am unwanted\"  Images: none  Thinking Processes: ruminating  Mood: anger  Behaviors: isolating/withdrawing , can't stop crying, not taking care of myself and not taking care of my responsibilities  Situations: small triggers, such as not being able to find something, or dropping something   Step 2: Coping strategies - Things I can do to take my mind off of my problems without contacting another person (relaxation technique, physical activity):  Distress Tolerance Strategies:  arts and crafts: drawing, play with my pet , listen to positive and upbeat music: any, change body temperature (ice pack/cold water)  and paced breathing/progressive muscle relaxation  Physical Activities: go for a walk, deep breathing and stretching   Focus on helpful thoughts:  \"You've been through this before, you can get through it again.\"  Step 3: People and social settings that provide distraction:                 Name: Carmen                            Name: Darien                           Name: Aleida       pool, shopping, Carmen's house, [employer]       Step 4: Remind myself of people and things that are " important to me and worth living for:  Sidney Clifford, Aleida, post-COVID world, options of what could be in your future        Step 5: When I am in crisis, I can ask these people to help me use my safety plan:                 Name: Sidney  Step 6: Making the environment safe:   go to sleep/daydream  Step 7: Professionals or agencies I can contact during a crisis:  [facility] Daytime Number: 751-593-2556  Suicide Prevention Lifeline: 5-539-984-VSCQ (9357)  Crisis Text Line Service (available 24 hours a day, 7 days a week): Text [place] to 292189  Local Crisis Services: [facility] Crisis: [phone]  Adults can always access to the emPATH unit at [facility] (no phone number, utilize it like an urgent care or ER where you just show up)     Call 911 or go to my nearest emergency department.       I helped develop this safety plan and agree to use it when needed.  I have been given a copy of this plan.       Client signature _________________________________________________________________  Today s date:  11/24/2020  Adapted from Safety Plan Template 2008 Randi Poole and Robby Barba is reprinted with the express permission of the authors.  No portion of the Safety Plan Template may be reproduced without the express, written permission.  You can contact the authors at [email or web address] or [email or web address].

## 2023-09-18 ENCOUNTER — TELEPHONE (OUTPATIENT)
Dept: FAMILY MEDICINE | Facility: CLINIC | Age: 68
End: 2023-09-18
Payer: MEDICARE

## 2023-09-18 ENCOUNTER — VIRTUAL VISIT (OUTPATIENT)
Dept: PSYCHOLOGY | Facility: CLINIC | Age: 68
End: 2023-09-18
Payer: MEDICARE

## 2023-09-18 DIAGNOSIS — F41.1 GENERALIZED ANXIETY DISORDER: ICD-10-CM

## 2023-09-18 DIAGNOSIS — F43.9 TRAUMA AND STRESSOR-RELATED DISORDER: ICD-10-CM

## 2023-09-18 DIAGNOSIS — F31.81 BIPOLAR 2 DISORDER (H): Primary | ICD-10-CM

## 2023-09-18 PROCEDURE — 90837 PSYTX W PT 60 MINUTES: CPT | Mod: VID | Performed by: SOCIAL WORKER

## 2023-09-18 ASSESSMENT — PATIENT HEALTH QUESTIONNAIRE - PHQ9
SUM OF ALL RESPONSES TO PHQ QUESTIONS 1-9: 18
10. IF YOU CHECKED OFF ANY PROBLEMS, HOW DIFFICULT HAVE THESE PROBLEMS MADE IT FOR YOU TO DO YOUR WORK, TAKE CARE OF THINGS AT HOME, OR GET ALONG WITH OTHER PEOPLE: EXTREMELY DIFFICULT
SUM OF ALL RESPONSES TO PHQ QUESTIONS 1-9: 18

## 2023-09-18 NOTE — LETTER
9/18/2023         RE: Randi Cleary  [address] [address]          We have tried to return your call multiple times and been unsuccessful. Please call us at [phone] at your convenience.    Thanks,  Camille VASQUEZ

## 2023-09-18 NOTE — PROGRESS NOTES
[facility]                                     Progress Note    Patient Name: Randi Cleary  Date: 9/18/23         Service Type: Individual     Session Start Time: 10:03 AM Session End Time: 10:58 AM     Session Length: 55 minutes    Session #: 201    Attendees: Client attended alone    Service Modality:  Video Visit:      Provider verified identity through the following two step process.  Patient provided:  Patient is known previously to provider    Telemedicine Visit: The patient's condition can be safely assessed and treated via synchronous audio and visual telemedicine encounter.      Reason for Telemedicine Visit: Patient convenience (e.g. access to timely appointments / distance to available provider)    Originating Site (Patient Location): Patient's home    Distant Site (Provider Location): Provider Remote Setting- Home Office    Consent:  The patient/guardian has verbally consented to: the potential risks and benefits of telemedicine (video visit) versus in person care; bill my insurance or make self-payment for services provided; and responsibility for payment of non-covered services.     Patient would like the video invitation sent by:  My Chart    Mode of Communication:  Video Conference via [facility]    Distant Location (Provider):  Off-site    As the provider I attest to compliance with applicable laws and regulations related to telemedicine.      DATA  Extended Session (53+ minutes): PROLONGED SERVICE IN THE OUTPATIENT SETTING REQUIRING DIRECT (FACE-TO-FACE) PATIENT CONTACT BEYOND THE USUAL SERVICE:    - High distress and under complex circumstances.  See Data section for details  Interactive Complexity: No  Crisis: No        Progress Since Last Session (Related to Symptoms / Goals / Homework):   Symptoms:  Patient is feeling really overwhelmed.      Homework:  Progress made  Schedule through [employer] -not done  Schedule shoulder surgery/see Vincent -not done  Follow up on foot  insert- ask Lorena if Ced would fill out form for this -not done  Find records from past psychiatrist -not done  Contact couples counselor at [phone] -not done       Episode of Care Goals: Satisfactory progress - ACTION (Actively working towards change); Intervened by reinforcing change plan / affirming steps taken     Current / Ongoing Stressors and Concerns:   Patient is currently socially isolated. She has a conflictual relationship with her .  She is getting minimal physical activity.  She has had several surgeries. Patient's car was just insured.     Treatment Objective(s) Addressed in This Session:   Patient will increase frequency of engaging her in ADLs.  Patient will track and record at least 5 pleasant exchanges with . Patient will be able to identify at least 5 positive traits about her .  Patient will reduce level of depressive and anxious features as evidenced by reduction in score on her CHAVO-7 and PHQ-9 (scores of 15 and 16 at first measurment, respectively).     Intervention:   Supportive Therapy:   Processed some of patient's financial frustrations, specifically frustrated with a situation with her 's unemployment. Reviewed homework and identified successes and barriers to completion.  Discussed how and what to prioritize moving forward.       The following assessments were completed by patient for this visit:  PHQ9:       8/7/2023    12:59 PM 8/9/2023     8:30 AM 8/16/2023     9:45 AM 8/21/2023     8:11 AM 9/5/2023     1:27 PM 9/13/2023    10:57 AM 9/18/2023     9:51 AM   PHQ-9 SCORE   PHQ-9 Total Score MyChart 21 (Severe depression) 19 (Moderately severe depression) 17 (Moderately severe depression) 13 (Moderate depression) 19 (Moderately severe depression) 17 (Moderately severe depression) 18 (Moderately severe depression)   PHQ-9 Total Score 21 19 17 13 19 17 18     GAD7:       6/6/2023    10:44 AM 6/22/2023     9:58 AM 7/6/2023     9:11 AM 7/21/2023    10:34 AM  8/7/2023     1:03 PM 8/21/2023    11:07 AM 9/5/2023     1:30 PM   CHAVO-7 SCORE   Total Score 12 (moderate anxiety) 17 (severe anxiety) 15 (severe anxiety) 18 (severe anxiety) 18 (severe anxiety) 13 (moderate anxiety) 15 (severe anxiety)   Total Score 12 17    17 15    15 18 18    18 13 15     PROMIS 10-Global Health (only subscores and total score):       5/5/2023     8:12 AM 6/8/2023    12:05 PM 6/22/2023    10:03 AM 7/6/2023     9:12 AM 7/21/2023    10:36 AM 7/28/2023     3:24 PM 8/7/2023     1:07 PM   PROMIS-10 Scores Only   Global Mental Health Score 6 6    6 5    5 5    5 5    5  6    6   Global Physical Health Score 9 9    9 7    7 8    8 7    7  8    8   PROMIS TOTAL - SUBSCORES 15 15    15 12    12 13    13 12    12  14    14       Information is confidential and restricted. Go to Review Flowsheets to unlock data.    Multiple values from one day are sorted in reverse-chronological order        ASSESSMENT: Current Emotional / Mental Status (status of significant symptoms):   Risk status (Self / Other harm or suicidal ideation)   Patient denies current fears or concerns for personal safety.   Patient denies current or recent suicidal ideation or behaviors.   Patient denies current or recent homicidal ideation or behaviors.   Patient denies current or recent self injurious behavior or ideation.   Patient denies other safety concerns.   Patient reports there has been no change in risk factors since their last session.     Patient reports there has been no change in protective factors since their last session.     A safety and risk management plan has been developed including: Patient consented to co-developed safety plan on 11/24/2020, updated 2/20/23.  Safety and risk management plan was reviewed.   Patient agreed to use safety plan should any safety concerns arise.  A copy was made available to the patient.     Appearance:   Appropriate    Eye Contact:   Good    Psychomotor Behavior: Normal     Attitude:   Cooperative    Orientation:   All   Speech    Rate / Production: Normal/ Responsive    Volume:  Normal    Mood:    Anxious    Affect:    Appropriate    Thought Content:  Clear    Thought Form:  Coherent    Insight:    Good      Medication Review:   No changes to current psychiatric medication(s)     Medication Compliance:   Yes     Changes in Health Issues:   None reported     Chemical Use Review:   Substance Use: Chemical use reviewed, no active concerns identified Nothing used since 2021.     Tobacco Use: No current tobacco use.      Diagnosis:  1. Bipolar 2 disorder (H)    2. Generalized anxiety disorder    3. Trauma and stressor-related disorder        Collateral Reports Completed:   Not Applicable    PLAN: (Patient Tasks / Therapist Tasks / Other)  Schedule through [employer]  Schedule shoulder surgery/see Lervick  Follow up on foot insert- ask Lorena if eCd would fill out form for this  Find records from past psychiatrist  Contact couples counselor at [phone]         There has been demonstrated improvement in functioning while patient has been engaged in psychotherapy/psychological service- if withdrawn the patient would deteriorate and/or relapse.     MICAELA SLADE, [facility]   2023                                                        ______________________________________________________________________    Individual Treatment Plan    Patient's Name: Randi Cleary  YOB: 1955    Date of Creation: 20  Date Treatment Plan Last Reviewed/Revised: 23    DSM5 Diagnoses: 296.89 Bipolar II Disorder Depressed, 300.02 (F41.1) Generalized Anxiety Disorder, or Adjustment Disorders  309.89 (F43.8) Other Specified Trauma and Stressor Related Disorder  Psychosocial / Contextual Factors: Patient's entire family of origin has , she now has a sister-in-law and  as support.  Relationship with  is conflictual. She is recovering from  "surgeries  PROMIS (reviewed every 90 days): PROMIS-10 Scores  PROMIS 10-Global Health (only subscores and total score):   PROMIS-10 Scores Only 12/14/2021 3/15/2022 3/15/2022 3/15/2022 3/24/2022 4/1/2022 6/9/2022   Global Mental Health Score 6 6 6 6 8 12 12   Global Physical Health Score 9 9 9 9 8 11 10   PROMIS TOTAL - SUBSCORES 15 15 15 15 16 23 22       Referral / Collaboration:  Referral to another professional/service is not indicated at this time..    Anticipated number of session for this episode of care: 50  Anticipation frequency of session: Biweekly  Anticipated Duration of each session: 53 or more minutes due to intensity of trauma symptoms  Treatment plan will be reviewed in 90 days or when goals have been changed.   There has been demonstrated improvement in functioning while patient has been engaged in psychotherapy/psychological service- if withdrawn the patient would deteriorate and/or relapse.       MeasurableTreatment Goal(s) related to diagnosis / functional impairment(s)  Goal 1: Patient will \"jumpstart, getting going with the things I need to be doing around the house as far as picking up, doing things, trying to do something every day.  Also to lessen the animosity between me and my .\"    I will know I've met my goal when my shoulders are fixed and I can see.      Objective #A (Patient Action)    Patient will  increase frequency of engaging her in ADLs .  Status: Continued - Date(s): 12/10/21, 3/9/22, 6/9/22, 9/2/22, 12/1/22, 3/2/23, 6/6/23, 9/13/23    Intervention(s)  Therapist will  engage patient in CBT, specifically behavioral activation .    Objective #B  Patient will   track and record at least 5 pleasant exchanges with . Patient will be able to identify at least 5 positive traits about her  and how he relates to her .  Status: Continued - Date(s): 12/10/21, 3/9/22, 6/9/22, 9/2/22, 12/1/22, 3/2/23, 6/6/23, 9/13/23    Intervention(s)  Therapist will  teach " "assertiveness skills and assign homework related to relationship interactions .    Objective #C  Patient will  reduce level of depressive and anxious features as evidenced by reduction in score on her CHAVO-7 and PHQ-9 (scores of 15 and 16 at first measurment, respectively) .  Status: Continued - Date(s): 12/10/21, 3/9/22, 6/9/22, 9/2/22, 12/1/22, 3/2/23, 6/6/23, 9/13/23    Intervention(s)  Therapist will  engage patient in person-centered therapy and CBT .    Patient has reviewed and agreed to the above plan.      MICAELA SLADE, [facility]  September 13, 2023                                                   Randi Cleary          SAFETY PLAN:  Step 1: Warning signs / cues (Thoughts, images, mood, situation, behavior) that a crisis may be developing:  Thoughts: \"I don't want to continue\" \"I am unwanted\"  Images: none  Thinking Processes: ruminating  Mood: anger  Behaviors: isolating/withdrawing , can't stop crying, not taking care of myself and not taking care of my responsibilities  Situations: small triggers, such as not being able to find something, or dropping something   Step 2: Coping strategies - Things I can do to take my mind off of my problems without contacting another person (relaxation technique, physical activity):  Distress Tolerance Strategies:  arts and crafts: drawing, play with my pet , listen to positive and upbeat music: any, change body temperature (ice pack/cold water)  and paced breathing/progressive muscle relaxation  Physical Activities: go for a walk, deep breathing and stretching   Focus on helpful thoughts:  \"You've been through this before, you can get through it again.\"  Step 3: People and social settings that provide distraction:                 Name: Carmen                            Name: D[place]en                           Name: Aleida       pool, shopping, Carmen's house, [employer]       Step 4: Remind myself of people and things that are important to me and worth living for:  Sidney " Aleida Horton, post-COVID world, options of what could be in your future        Step 5: When I am in crisis, I can ask these people to help me use my safety plan:                 Name: Sidney  Step 6: Making the environment safe:   go to sleep/daydream  Step 7: Professionals or agencies I can contact during a crisis:  [facility] Daytime Number: [phone]  Suicide Prevention Lifeline: 8-222-377-DENM (2104)  Crisis Text Line Service (available 24 hours a day, 7 days a week): Text [place] to 050338  Local Crisis Services: [facility] Crisis: [phone]  Adults can always access to the emPATH unit at [facility] (no phone number, utilize it like an urgent care or ER where you just show up)     Call 911 or go to my nearest emergency department.       I helped develop this safety plan and agree to use it when needed.  I have been given a copy of this plan.       Client signature _________________________________________________________________  Today s date:  11/24/2020  Adapted from Safety Plan Template 2008 Randi Poole and Robby Barba is reprinted with the express permission of the authors.  No portion of the Safety Plan Template may be reproduced without the express, written permission.  You can contact the authors at [email or web address] or [email or web address].

## 2023-09-18 NOTE — TELEPHONE ENCOUNTER
[facility] phone call message- general phone call:    Reason for call: Would like to speak about diabetic inserts.    Return call needed: Yes    OK to leave a message on voice mail? Yes    Primary language: English      needed? No    Call taken on September 18, 2023 at 3:07 PM by Rhona Ochoa

## 2023-09-20 ENCOUNTER — VIRTUAL VISIT (OUTPATIENT)
Dept: PSYCHOLOGY | Facility: CLINIC | Age: 68
End: 2023-09-20
Payer: MEDICARE

## 2023-09-20 ENCOUNTER — HOSPITAL ENCOUNTER (OUTPATIENT)
Dept: BEHAVIORAL HEALTH | Facility: CLINIC | Age: 68
Discharge: HOME | End: 2023-09-20
Attending: PSYCHIATRY & NEUROLOGY
Payer: MEDICARE

## 2023-09-20 DIAGNOSIS — F31.81 BIPOLAR 2 DISORDER (H): Primary | ICD-10-CM

## 2023-09-20 DIAGNOSIS — F41.1 GENERALIZED ANXIETY DISORDER: ICD-10-CM

## 2023-09-20 DIAGNOSIS — F43.9 TRAUMA AND STRESSOR-RELATED DISORDER: ICD-10-CM

## 2023-09-20 DIAGNOSIS — F10.21 ALCOHOL USE DISORDER, MODERATE, IN SUSTAINED REMISSION (H): ICD-10-CM

## 2023-09-20 PROCEDURE — 90853 GROUP PSYCHOTHERAPY: CPT

## 2023-09-20 PROCEDURE — 99215 OFFICE O/P EST HI 40 MIN: CPT | Performed by: PSYCHIATRY & NEUROLOGY

## 2023-09-20 PROCEDURE — 90834 PSYTX W PT 45 MINUTES: CPT | Mod: VID | Performed by: SOCIAL WORKER

## 2023-09-20 ASSESSMENT — ANXIETY QUESTIONNAIRES
5. BEING SO RESTLESS THAT IT IS HARD TO SIT STILL: SEVERAL DAYS
GAD7 TOTAL SCORE: 16
3. WORRYING TOO MUCH ABOUT DIFFERENT THINGS: MORE THAN HALF THE DAYS
5. BEING SO RESTLESS THAT IT IS HARD TO SIT STILL: SEVERAL DAYS
4. TROUBLE RELAXING: NEARLY EVERY DAY
8. IF YOU CHECKED OFF ANY PROBLEMS, HOW DIFFICULT HAVE THESE MADE IT FOR YOU TO DO YOUR WORK, TAKE CARE OF THINGS AT HOME, OR GET ALONG WITH OTHER PEOPLE?: VERY DIFFICULT
GAD7 TOTAL SCORE: 16
GAD7 TOTAL SCORE: 16
7. FEELING AFRAID AS IF SOMETHING AWFUL MIGHT HAPPEN: MORE THAN HALF THE DAYS
GAD7 TOTAL SCORE: 16
IF YOU CHECKED OFF ANY PROBLEMS ON THIS QUESTIONNAIRE, HOW DIFFICULT HAVE THESE PROBLEMS MADE IT FOR YOU TO DO YOUR WORK, TAKE CARE OF THINGS AT HOME, OR GET ALONG WITH OTHER PEOPLE: VERY DIFFICULT
4. TROUBLE RELAXING: NEARLY EVERY DAY
GAD7 TOTAL SCORE: 16
IF YOU CHECKED OFF ANY PROBLEMS ON THIS QUESTIONNAIRE, HOW DIFFICULT HAVE THESE PROBLEMS MADE IT FOR YOU TO DO YOUR WORK, TAKE CARE OF THINGS AT HOME, OR GET ALONG WITH OTHER PEOPLE: VERY DIFFICULT
IF YOU CHECKED OFF ANY PROBLEMS ON THIS QUESTIONNAIRE, HOW DIFFICULT HAVE THESE PROBLEMS MADE IT FOR YOU TO DO YOUR WORK, TAKE CARE OF THINGS AT HOME, OR GET ALONG WITH OTHER PEOPLE: VERY DIFFICULT
GAD7 TOTAL SCORE: 16
7. FEELING AFRAID AS IF SOMETHING AWFUL MIGHT HAPPEN: MORE THAN HALF THE DAYS
2. NOT BEING ABLE TO STOP OR CONTROL WORRYING: MORE THAN HALF THE DAYS
GAD7 TOTAL SCORE: 16
2. NOT BEING ABLE TO STOP OR CONTROL WORRYING: MORE THAN HALF THE DAYS
7. FEELING AFRAID AS IF SOMETHING AWFUL MIGHT HAPPEN: MORE THAN HALF THE DAYS
1. FEELING NERVOUS, ANXIOUS, OR ON EDGE: NEARLY EVERY DAY
7. FEELING AFRAID AS IF SOMETHING AWFUL MIGHT HAPPEN: MORE THAN HALF THE DAYS
1. FEELING NERVOUS, ANXIOUS, OR ON EDGE: NEARLY EVERY DAY
4. TROUBLE RELAXING: NEARLY EVERY DAY
6. BECOMING EASILY ANNOYED OR IRRITABLE: NEARLY EVERY DAY
3. WORRYING TOO MUCH ABOUT DIFFERENT THINGS: MORE THAN HALF THE DAYS
6. BECOMING EASILY ANNOYED OR IRRITABLE: NEARLY EVERY DAY
5. BEING SO RESTLESS THAT IT IS HARD TO SIT STILL: SEVERAL DAYS
2. NOT BEING ABLE TO STOP OR CONTROL WORRYING: MORE THAN HALF THE DAYS
6. BECOMING EASILY ANNOYED OR IRRITABLE: NEARLY EVERY DAY
1. FEELING NERVOUS, ANXIOUS, OR ON EDGE: NEARLY EVERY DAY
3. WORRYING TOO MUCH ABOUT DIFFERENT THINGS: MORE THAN HALF THE DAYS

## 2023-09-20 NOTE — PROGRESS NOTES
[facility]                                     Progress Note    Patient Name: Randi Cleary  Date: 9/20/23         Service Type: Individual     Session Start Time: 8:00 AM Session End Time: 8:54 AM     Session Length: 54 minutes    Session #: 202    Attendees: Client attended alone    Service Modality:  Video Visit:      Provider verified identity through the following two step process.  Patient provided:  Patient is known previously to provider    Telemedicine Visit: The patient's condition can be safely assessed and treated via synchronous audio and visual telemedicine encounter.      Reason for Telemedicine Visit: Patient convenience (e.g. access to timely appointments / distance to available provider)    Originating Site (Patient Location): Patient's home    Distant Site (Provider Location): Provider Remote Setting- Home Office    Consent:  The patient/guardian has verbally consented to: the potential risks and benefits of telemedicine (video visit) versus in person care; bill my insurance or make self-payment for services provided; and responsibility for payment of non-covered services.     Patient would like the video invitation sent by:  My Chart    Mode of Communication:  Video Conference via [facility]    Distant Location (Provider):  Off-site    As the provider I attest to compliance with applicable laws and regulations related to telemedicine.      DATA  Extended Session (53+ minutes): PROLONGED SERVICE IN THE OUTPATIENT SETTING REQUIRING DIRECT (FACE-TO-FACE) PATIENT CONTACT BEYOND THE USUAL SERVICE:    - High distress and under complex circumstances.  See Data section for details  Interactive Complexity: No  Crisis: No        Progress Since Last Session (Related to Symptoms / Goals / Homework):   Symptoms:  Patient has some stability as they are moving forward with meeting their medical needs.      Homework:  Progress made  Schedule through [employer]  Schedule shoulder surgery/see  Vincent  Follow up on foot insert- ask Lorena if Ced would fill out form for this  Find records from past psychiatrist  Contact couples counselor at [phone]        Episode of Care Goals: Satisfactory progress - ACTION (Actively working towards change); Intervened by reinforcing change plan / affirming steps taken     Current / Ongoing Stressors and Concerns:   Patient is currently socially isolated. She has a conflictual relationship with her .  She is getting minimal physical activity.  She has had several surgeries. Patient's car was just insured.     Treatment Objective(s) Addressed in This Session:   Patient will increase frequency of engaging her in ADLs.  Patient will track and record at least 5 pleasant exchanges with . Patient will be able to identify at least 5 positive traits about her .  Patient will reduce level of depressive and anxious features as evidenced by reduction in score on her CHAVO-7 and PHQ-9 (scores of 15 and 16 at first measurment, respectively).     Intervention:   Supportive Therapy:   Discussed health concerns and changes, talked about the steps they are taking to prioritize their needs. Looked at some of their frustrations with finances and steps they are taking with this. Looked at social connections.       The following assessments were completed by patient for this visit:  PHQ9:       8/7/2023    12:59 PM 8/9/2023     8:30 AM 8/16/2023     9:45 AM 8/21/2023     8:11 AM 9/5/2023     1:27 PM 9/13/2023    10:57 AM 9/18/2023     9:51 AM   PHQ-9 SCORE   PHQ-9 Total Score MyChart 21 (Severe depression) 19 (Moderately severe depression) 17 (Moderately severe depression) 13 (Moderate depression) 19 (Moderately severe depression) 17 (Moderately severe depression) 18 (Moderately severe depression)   PHQ-9 Total Score 21 19 17 13 19 17 18     GAD7:       6/6/2023    10:44 AM 6/22/2023     9:58 AM 7/6/2023     9:11 AM 7/21/2023    10:34 AM 8/7/2023     1:03 PM  8/21/2023    11:07 AM 9/5/2023     1:30 PM   CHAVO-7 SCORE   Total Score 12 (moderate anxiety) 17 (severe anxiety) 15 (severe anxiety) 18 (severe anxiety) 18 (severe anxiety) 13 (moderate anxiety) 15 (severe anxiety)   Total Score 12 17    17 15    15 18 18    18 13 15     PROMIS 10-Global Health (only subscores and total score):       5/5/2023     8:12 AM 6/8/2023    12:05 PM 6/22/2023    10:03 AM 7/6/2023     9:12 AM 7/21/2023    10:36 AM 7/28/2023     3:24 PM 8/7/2023     1:07 PM   PROMIS-10 Scores Only   Global Mental Health Score 6 6    6 5    5 5    5 5    5  6    6   Global Physical Health Score 9 9    9 7    7 8    8 7    7  8    8   PROMIS TOTAL - SUBSCORES 15 15    15 12    12 13    13 12    12  14    14       Information is confidential and restricted. Go to Review Flowsheets to unlock data.    Multiple values from one day are sorted in reverse-chronological order        ASSESSMENT: Current Emotional / Mental Status (status of significant symptoms):   Risk status (Self / Other harm or suicidal ideation)   Patient denies current fears or concerns for personal safety.   Patient denies current or recent suicidal ideation or behaviors.   Patient denies current or recent homicidal ideation or behaviors.   Patient denies current or recent self injurious behavior or ideation.   Patient denies other safety concerns.   Patient reports there has been no change in risk factors since their last session.     Patient reports there has been no change in protective factors since their last session.     A safety and risk management plan has been developed including: Patient consented to co-developed safety plan on 11/24/2020, updated 2/20/23.  Safety and risk management plan was reviewed.   Patient agreed to use safety plan should any safety concerns arise.  A copy was made available to the patient.     Appearance:   Appropriate    Eye Contact:   Good    Psychomotor Behavior: Normal    Attitude:   Cooperative     Orientation:   All   Speech    Rate / Production: Normal/ Responsive    Volume:  Normal    Mood:    Anxious    Affect:    Appropriate    Thought Content:  Clear    Thought Form:  Coherent    Insight:    Good      Medication Review:   No changes to current psychiatric medication(s)     Medication Compliance:   Yes     Changes in Health Issues:   None reported     Chemical Use Review:   Substance Use: Chemical use reviewed, no active concerns identified Nothing used since 2021.     Tobacco Use: No current tobacco use.      Diagnosis:  1. Bipolar 2 disorder (H)    2. Generalized anxiety disorder    3. Trauma and stressor-related disorder      Collateral Reports Completed:   Not Applicable    PLAN: (Patient Tasks / Therapist Tasks / Other)  Schedule through [email or web address]  Schedule shoulder surgery/see Lervick  Follow up on foot insert- ask Lorena if Ced would fill out form for this  Find records from past psychiatrist  Contact couples counselor at [phone]         There has been demonstrated improvement in functioning while patient has been engaged in psychotherapy/psychological service- if withdrawn the patient would deteriorate and/or relapse.     MICAELA SLADE, [facility]   2023                                                        ______________________________________________________________________    Individual Treatment Plan    Patient's Name: Randi Cleary  YOB: 1955    Date of Creation: 20  Date Treatment Plan Last Reviewed/Revised: 23    DSM5 Diagnoses: 296.89 Bipolar II Disorder Depressed, 300.02 (F41.1) Generalized Anxiety Disorder, or Adjustment Disorders  309.89 (F43.8) Other Specified Trauma and Stressor Related Disorder  Psychosocial / Contextual Factors: Patient's entire family of origin has , she now has a sister-in-law and  as support.  Relationship with  is conflictual. She is recovering from surgeries  PROMIS (reviewed every 90  "days): PROMIS-10 Scores  PROMIS 10-Global Health (only subscores and total score):   PROMIS-10 Scores Only 12/14/2021 3/15/2022 3/15/2022 3/15/2022 3/24/2022 4/1/2022 6/9/2022   Global Mental Health Score 6 6 6 6 8 12 12   Global Physical Health Score 9 9 9 9 8 11 10   PROMIS TOTAL - SUBSCORES 15 15 15 15 16 23 22       Referral / Collaboration:  Referral to another professional/service is not indicated at this time..    Anticipated number of session for this episode of care: 50  Anticipation frequency of session: Biweekly  Anticipated Duration of each session: 53 or more minutes due to intensity of trauma symptoms  Treatment plan will be reviewed in 90 days or when goals have been changed.   There has been demonstrated improvement in functioning while patient has been engaged in psychotherapy/psychological service- if withdrawn the patient would deteriorate and/or relapse.       MeasurableTreatment Goal(s) related to diagnosis / functional impairment(s)  Goal 1: Patient will \"jumpstart, getting going with the things I need to be doing around the house as far as picking up, doing things, trying to do something every day.  Also to lessen the animosity between me and my .\"    I will know I've met my goal when my shoulders are fixed and I can see.      Objective #A (Patient Action)    Patient will  increase frequency of engaging her in ADLs .  Status: Continued - Date(s): 12/10/21, 3/9/22, 6/9/22, 9/2/22, 12/1/22, 3/2/23, 6/6/23, 9/13/23    Intervention(s)  Therapist will  engage patient in CBT, specifically behavioral activation .    Objective #B  Patient will   track and record at least 5 pleasant exchanges with . Patient will be able to identify at least 5 positive traits about her  and how he relates to her .  Status: Continued - Date(s): 12/10/21, 3/9/22, 6/9/22, 9/2/22, 12/1/22, 3/2/23, 6/6/23, 9/13/23    Intervention(s)  Therapist will  teach assertiveness skills and assign homework related " "to relationship interactions .    Objective #C  Patient will  reduce level of depressive and anxious features as evidenced by reduction in score on her CHAVO-7 and PHQ-9 (scores of 15 and 16 at first measurment, respectively) .  Status: Continued - Date(s): 12/10/21, 3/9/22, 6/9/22, 9/2/22, 12/1/22, 3/2/23, 6/6/23, 9/13/23    Intervention(s)  Therapist will  engage patient in person-centered therapy and CBT .    Patient has reviewed and agreed to the above plan.      MICAELA SLADE, [facility]  September 13, 2023                                                   Randi Cleary          SAFETY PLAN:  Step 1: Warning signs / cues (Thoughts, images, mood, situation, behavior) that a crisis may be developing:  Thoughts: \"I don't want to continue\" \"I am unwanted\"  Images: none  Thinking Processes: ruminating  Mood: anger  Behaviors: isolating/withdrawing , can't stop crying, not taking care of myself and not taking care of my responsibilities  Situations: small triggers, such as not being able to find something, or dropping something   Step 2: Coping strategies - Things I can do to take my mind off of my problems without contacting another person (relaxation technique, physical activity):  Distress Tolerance Strategies:  arts and crafts: drawing, play with my pet , listen to positive and upbeat music: any, change body temperature (ice pack/cold water)  and paced breathing/progressive muscle relaxation  Physical Activities: go for a walk, deep breathing and stretching   Focus on helpful thoughts:  \"You've been through this before, you can get through it again.\"  Step 3: People and social settings that provide distraction:                 Name: Carmen                            Name: Darien                           Name: Aleida       pool, shopping, Carmen's house, [employer]       Step 4: Remind myself of people and things that are important to me and worth living for:  Clifford Little Donna, post-COVID world, options of what " could be in your future        Step 5: When I am in crisis, I can ask these people to help me use my safety plan:                 Name: Sidney  Step 6: Making the environment safe:   go to sleep/daydream  Step 7: Professionals or agencies I can contact during a crisis:  [facility] Daytime Number: [phone]  Suicide Prevention Lifeline: 8-355-009-TALK (8255)  Crisis Text Line Service (available 24 hours a day, 7 days a week): Text [place] to 714139  Local Crisis Services: [facility] Crisis: [phone]  Adults can always access to the emPATH unit at [facility] (no phone number, utilize it like an urgent care or ER where you just show up)     Call 911 or go to my nearest emergency department.       I helped develop this safety plan and agree to use it when needed.  I have been given a copy of this plan.       Client signature _________________________________________________________________  Today s date:  11/24/2020  Adapted from Safety Plan Template 2008 Randi Poole and Robby Barba is reprinted with the express permission of the authors.  No portion of the Safety Plan Template may be reproduced without the express, written permission.  You can contact the authors at [email or web address] or [email or web address].

## 2023-09-20 NOTE — GROUP NOTE
Psychotherapy Group Note    PATIENT'S NAME: Randi Cleary  MRN:   [id number]  :   1955  ACCT. NUMBER: [id number]  DATE OF SERVICE: 23  START TIME:  2:00 PM  END TIME:  2:50 PM  FACILITATOR: Susie Webb LICSW  TOPIC: MH EBP Group: Specialty Awareness  [facility] Mental Health Day Treatment  TRACK: clinic 1    NUMBER OF PARTICIPANTS: 3    Summary of Group / Topics Discussed:  Specialty Topics: Hope: The topic of hope was presented in order to help patients better understand the symptoms of hopelessness and how to become more hopeful. Patients discussed their current awareness of the topic and relevance to their functioning. Individual experiences with symptoms and treatment options were also discussed. Patients explored options for ongoing/future treatment and symptom management.      Patient Session Goals / Objectives:  Discussed definition of hopelessness  Discussed how hopelessness impacts functioning  Set a plan to utilize skills to reduce hopelessness      Patient Participation / Response:  Fully participated with the group by sharing personal reflections / insights and openly received / provided feedback with other participants.    Demonstrated understanding of topics discussed through group discussion and participation and Identified / Expressed readiness to act on skill suggestions discussed in topic    Treatment Plan:  Patient has a current master individualized treatment plan.  See Epic treatment plan for more information.    ASHKAN Alvarado

## 2023-09-20 NOTE — GROUP NOTE
Process Group Note    PATIENT'S NAME: Randi Cleary  MRN:   [id number]  :   1955  ACCT. NUMBER: [id number]  DATE OF SERVICE: 23  START TIME:  1:00 PM  END TIME:  1:50 PM  FACILITATOR: Susie Webb LICSW  TOPIC:  Process Group    Diagnoses:  296.33 (F33.2) Major Depressive Disorder, Recurrent Episode, Severe With anxious distress  300.02 (F41.1) Generalized Anxiety Disorder    Rule out:  296.89 Bipolar II Disorder vs. 314.01 (F90.2) Attention-Deficit/Hyperactivity Disorder   Combined presentation    [facility] Mental [facility] Day Treatment  TRACK: clinic 1    NUMBER OF PARTICIPANTS: 3        Data:    Session content: At the start of this group, patients were invited to check in by identifying themselves, describing their current emotional status, and identifying issues to address in this group.   Area(s) of treatment focus addressed in this session included Symptom Management, Personal Safety, and Community Resources/Discharge Planning.  Pt reports feeling distracted and defeated. Pt is worried about finances and upset that  missed 8 wks of unemployment. This triggered memories of childhood money problems. Pt reports increased pain following over-doing it. She has switched to oxycontin but is feeling anxious about that. Pt is looking forward to psychiatry appointment next week. She enjoyed time with a friend. She sets a goal to calm down. Discussed MEE groups. Denies safety concerns.    Therapeutic Interventions/Treatment Strategies:  Psychotherapist offered support, feedback and validation, provided redirection, and reinforced use of skills. Treatment modalities used include Cognitive Behavioral Therapy. Interventions include Coping Skills: Discussed use of self-soothe skills to decrease distress in the body, Reviewed patients current calming practices and discussed a more formal way of practicing and accessing skills, and Assisted patient in understanding the purpose of  planning / creating / participating / sharing in positive experiences and Other: Discussed ways to increase hopefulness.    Assessment:    Patient response:   Patient responded to session by accepting feedback, giving feedback, listening, focusing on goals, being attentive, and accepting support    Possible barriers to participation / learning include: and no barriers identified    Health Issues:   Yes: Pain, Associated Psychological Distress       Substance Use Review:   Substance Use: No active concerns identified.    Mental Status/Behavioral Observations  Appearance:   Disheveled   Eye Contact:   Good   Psychomotor Behavior: Normal   Attitude:   Cooperative   Orientation:   All  Speech   Rate / Production: Normal    Volume:  Normal   Mood:    Angry  Anxious  Depressed   Affect:    Tearful Worrisome   Thought Content:   Rumination  Thought Form:  Coherent  Obsessive  Tangential     Insight:    Fair     Plan:   Safety Plan: No current safety concerns identified.  Recommended that patient call 911 or go to the local ED should there be a change in any of these risk factors.   Barriers to treatment: None identified  Patient Contracts (see media tab):  None  Substance Use: Not addressed in session   Continue or Discharge: Patient will continue in Adult Day Treatment (ADT)  as planned. Patient is likely to benefit from learning and using skills as they work toward the goals identified in their treatment plan.      Susie Townsend, [facility]  September 20, 2023

## 2023-09-20 NOTE — TELEPHONE ENCOUNTER
2nd attempt to reach pt. No answer, lmtcb. Sending letter and closing encounter.    Camille Beltran RN

## 2023-09-20 NOTE — PROGRESS NOTES
"[facility]   Adult Mental Health Outpatient Programs  Provider Interval History Note    Program: Clinic one    PATIENT'S NAME: Randi Cleary  MRN:   [id number]  :   1955  ACCT. NUMBER: [id number]  DATE OF SERVICE: 23    Interval History:  \"It's hard.\" Winnie presents today for follow-up and ongoing program supervision.   Endorses:  Ongoing frustration with medical bills and appointments  Ongoing financial concerns, particularly given number of recent medical appointments  Also apparently has to submit some of her medical bills directly to Medicare rather than her clinic being able to do so  Surgery on foot soon  Still hoping to get surgery on right shoulder in particular    Substance use:  Denies    Reactions/thoughts about program:  Continues to find it helpful  Concerned that there are few group offerings for Medicare  Discussed MEE  groups as an option    Safety Assessment:  Suicidal ideation: none endorsed  Thoughts of non-suicidal self-injury: none endorsed  Recent self-injurious behavior: none endorsed  Homicidal ideation: none endorsed  Other safety concerns: none endorsed    Medications:  Current Outpatient Medications   Medication Sig Dispense Refill    Fluticasone-Umeclidin-Vilanterol (TRELEGY ELLIPTA) 200-62.5-25 MCG/INH oral inhaler Inhale 1 puff into the lungs daily 4 each 3    oxyCODONE (ROXICODONE) 5 MG tablet Take 1 tablet (5 mg) by mouth every 6 hours as needed for pain (chronic pain) Dispense/start 9/15/23 56 tablet 0    albuterol (PROVENTIL) (2.5 MG/3ML) 0.083% neb solution Take 1 vial (2.5 mg) by nebulization every 4 hours as needed for shortness of breath / dyspnea or wheezing 360 mL 5    albuterol (VENTOLIN HFA) 108 (90 Base) MCG/ACT inhaler Inhale 2 puffs into the lungs every 6 hours 18 g 11    benzonatate (TESSALON) 200 MG capsule Take 1 capsule (200 mg) by mouth 3 times daily as needed for cough 30 capsule 1    Cholecalciferol (VITAMIN " D3) 250 MCG (69784 UT) TABS Take 1 tablet by mouth daily 72375/day      Cyanocobalamin (VITAMIN B-12) 5000 MCG SUBL Place 2-3 sprays under the tongue daily Unknown dose. 2 or 3 sprays/day      ethacrynic acid (EDECRIN) 25 MG tablet Take 1 tablet (25 mg) by mouth every other day 45 tablet 3    KLOR-CON 20 MEQ CR tablet Take 1 tablet (20 mEq total) by mouth 2 (two) times a day. 180 tablet 0    magnesium 250 MG tablet Take 1 tablet by mouth 2 times daily      medical cannabis (Patient's own supply) See Admin Instructions (The purpose of this order is to document that the patient reports taking medical cannabis.  This is not a prescription, and is not used to certify that the patient has a qualifying medical condition.)  Flower      omeprazole (PRILOSEC) 20 MG DR capsule Take 1 capsule (20 mg) by mouth daily 90 capsule 3    rOPINIRole (REQUIP) 2 MG tablet One tab 3 pm, one bedtime, and one during night on waking 90 tablet 3    SYNTHROID 150 MCG tablet Take 1 tablet (150 mcg) by mouth daily 90 tablet 4    vitamin E 400 units TABS Take 800 Units by mouth daily         The above list was reviewed and updated in EPIC with patient today.     Patient is taking medications as prescribed and denies adverse effects    Laboratory Results:  Most recent labs reviewed. Pertinent updates/findings: elevations in ferritin, etc. Phlebotomy planned.     Metrics:  PHQ-9 scores:       9/5/2023     1:27 PM 9/13/2023    10:57 AM 9/18/2023     9:51 AM 9/20/2023    10:10 AM   PHQ-9 SCORE   PHQ-9 Total Score MyChart 19 (Moderately severe depression) 17 (Moderately severe depression) 18 (Moderately severe depression) 18 (Moderately severe depression)   PHQ-9 Total Score 19 17 18 18       CHAVO-7 scores:       8/21/2023    11:07 AM 9/5/2023     1:30 PM 9/20/2023     9:59 AM   CHAVO-7 SCORE   Total Score 13 (moderate anxiety) 15 (severe anxiety) 16 (severe anxiety)   Total Score 13 15 16    16    16       CSSR-S: [place] Suicide Severity Rating Scale  "(Short Version)      8/12/2020     3:00 PM 12/21/2021    11:31 AM 1/9/2023     1:00 PM   [place] Suicide Severity Rating (Short Version)   Over the past 2 weeks have you felt down, depressed, or hopeless? yes yes    Over the past 2 weeks have you had thoughts of killing yourself? no no    Have you ever attempted to kill yourself? no no    Q1 Wished to be Dead (Past Month)   yes   Q2 Suicidal Thoughts (Past Month)   no   Q3 Suicidal Thought Method   no   Q4 Suicidal Intent without Specific Plan   no   Q5 Suicide Intent with Specific Plan   no   Q6 Suicide Behavior (Lifetime)   yes   Within the Past 3 Months?   no   Level of Risk per Screen   moderate risk         Mental Status Examination:  Vital Signs: see chart   Appearance: appropriately groomed, appears stated age, and in no apparent distress.  Attitude: cooperative   Eye Contact: good   Muscle Strength and Tone: no gross abnormalities   Psychomotor Behavior: normal or unremarkable   Gait and Station:  antalgic gait, ambulates with a cane  Speech: normal rate, production, volume, and rhythm of  Associations: No loosening of associations and Perseverative  Thought Process: coherent, goal directed, and perseverating  Thought Content: no evidence of suicidal ideation or homicidal ideation, no evidence of psychotic thought, no auditory hallucinations present, and no visual hallucinations present  Mood: \" upset \"  Affect: initially mood congruent and tearful, brightened over the course of the conversation  Insight: good  Judgment: intact, adequate for safety  Impulse Control: intact  Oriented to: time, place, person, and situation  Attention Span and Concentration: normal  Language: Intact  Recent and Remote Memory: intact to interview. Not formally assessed. No amnesia.  Fund of Knowledge/Assessment of Intelligence: Average  Capacity of Activities of Daily Living: Independent, able to participate in programmatic care services.    Diagnosis/es:    ICD-10-CM    1. " "Bipolar 2 disorder (H)  F31.81 Adult Mental Health  Referral      2. Generalized anxiety disorder  F41.1 Adult Mental Health  Referral      3. Trauma and stressor-related disorder  F43.9       4. Alcohol use disorder, moderate, in sustained remission (H)  F10.21         Cannot rule out 309.81 (F43.10) Posttraumatic Stress Disorder (includes Posttraumatic Stress Disorder for Children 6 Years and Younger)          Assessment/Plan:  Winnie presents today for follow up. Endorses feeling overwhelmed by the complexity and expense of her medical treatment. Continues to feel discouraged. Discussed ways of continuing to advocate for herself and her medical treatment, focusing on curiosity and specifically the question \"why?\" For example, has questions about a proposed carpal tunnel steroid injection and was rejecting the intervention based on a conversation about another steroid injection with a different physician. I encouraged her to ask her hand doctor to explain why she was recommending that particular intervention and how it might be different from other steroid injections (discussed that intrathecal, intra-articular, and carpal tunnel corticosteroid injections might all have very different considerations). Patient also encouraged to ask \"how\" as well. Finally patient encouraged to remember and make use of skills she has learned during her time in our programs.    Patient expressed concern that some group offerings are not covered by Medicare. Discussed that Medicare reimbursement is changing in the new year which may present additional options. In the meantime she can seek out peer support groups through MEE.    Discussed that low mood is impacted by pain, and likely vice versa. Patient has had multiple medication trials, including ketamine for pain but not for depression. In the interest of another opinion I have placed a referral to the Difficult-to-Treat Depression (DTTD) Clinic through the " [facility].    Patient will be transitioning out of our clinic and therefore out of our programs entirely next week. She will also be meeting with her new psychiatric team through the [facility] resident clinic next week. I am available to coordinate that transition if needed.    No changes to medications today.    Bipolar disorder, most recently depressed  Overall improved   No changes to medications today  Anticipate discharge from our continuity clinic next week as planned    Anxiety  Overall improved  Still symptomatic, related to pain and other concerns  I have placed an internal referral to our  to see if assistance can be found for financial concerns  Plan otherwise per above    Trauma and stress related disorder, rule out PTSD  Overall improved  Continue individual therapy  Recommended patient to seek out groups through MEE  Plan otherwise per above    Alcohol use disorder in sustained remission  Overall stable  Continue to monitor; no additional intervention necessary at this time    Continue all other medications as reviewed per electronic medical record today    Continue therapy as planned:  Enrolled in aftercare clinic  I feel this patient does not meet criteria for an involuntary hold and is appropriate for treatment at an outpatient level of care.  Continue with individual therapist as appropriate    Safety plan reviewed:  To the Emergency Department as needed or call after hours crisis line at [phone] or [phone]. [place] Crisis Text Line: Text [place] to 370134 or Suicide LifeLine Chat: suicidepreventionlifeline.org/chat    Follow-up:   Follow up with outpatient provider(s) as planned or sooner if needed for acute medical concerns.    Questions or concerns:  Call program line with questions or concerns (see below)  ArtBinderhart may be used to communicate with your provider, but this is not intended to be used for emergencies.    [facility]  Health Program lines:  [facility]: [phone]  Dual Disorder: [phone]  Adult Day Treatment:  [phone]  55+/Intensive Outpatient: [phone]    Community Resources:    National Suicide Prevention Lifeline: 988 from any phone, or 598-623-5381 (TTY: 773.470.1981). Call anytime for help.  (www.suicidepreventionlifeline.org)  National [place] on Mental Illness (www.mary.org): 663.833.3919 or 446-240-7345.   Mental Health Association (www.mentalhealth.org): 937.163.7549 or 445-747-7296.  [place] Crisis Text Line: Text [place] to 984149  Suicide LifeLine Chat: suicideAdInnovation.org/chat    Treatment Objective(s) Addressed in This Session:  The purpose of today's virtual visit is for this writer to provide oversight of patient's care while receiving program services. Specific treatment goals addressed included personal safety, symptoms stabilization and management, wellness and mental health, and community resources/discharge planning.     This author or another program provider will follow up with the patient as noted above.     Patient agrees with the current plan of care.    Roland Das MD  9/20/23      Visit Details:  Type of service: In-person    Location (patient and provider): [facility] Adult Mental Health Outpatient Programmatic Care Offices        Medical Decision Making  The patient's presentation was of moderate complexity (2 or more stable chronic illnesses).    The patient's evaluation involved:  history and exam without other MDM data elements    The patient's management necessitated moderate risk (prescription drug management).     40 or more minutes spent on the date of the encounter doing chart review, patient visit, documentation, and discussion with other provider(s)    This document completed in part using Dragon Medical One dictation software.  Please excuse any inadvertent word or phrase substitutions.

## 2023-09-21 ENCOUNTER — VIRTUAL VISIT (OUTPATIENT)
Dept: INTERNAL MEDICINE | Facility: CLINIC | Age: 68
End: 2023-09-21
Payer: MEDICARE

## 2023-09-21 ENCOUNTER — PATIENT OUTREACH (OUTPATIENT)
Dept: CARE COORDINATION | Facility: CLINIC | Age: 68
End: 2023-09-21

## 2023-09-21 DIAGNOSIS — I27.20 PULMONARY HYPERTENSION (H): ICD-10-CM

## 2023-09-21 DIAGNOSIS — E83.110 HEREDITARY HEMOCHROMATOSIS (H): ICD-10-CM

## 2023-09-21 DIAGNOSIS — F31.81 BIPOLAR 2 DISORDER (H): ICD-10-CM

## 2023-09-21 DIAGNOSIS — E11.9 TYPE 2 DIABETES MELLITUS WITHOUT COMPLICATION, WITHOUT LONG-TERM CURRENT USE OF INSULIN (H): Primary | ICD-10-CM

## 2023-09-21 DIAGNOSIS — M79.671 RIGHT FOOT PAIN: ICD-10-CM

## 2023-09-21 DIAGNOSIS — E89.0 POSTABLATIVE HYPOTHYROIDISM: ICD-10-CM

## 2023-09-21 DIAGNOSIS — L40.50 PSORIATIC ARTHROPATHY (H): ICD-10-CM

## 2023-09-21 DIAGNOSIS — R06.02 SOB (SHORTNESS OF BREATH): ICD-10-CM

## 2023-09-21 DIAGNOSIS — E66.01 MORBID OBESITY (H): ICD-10-CM

## 2023-09-21 PROBLEM — R73.03 PREDIABETES: Status: RESOLVED | Noted: 2019-11-19 | Resolved: 2023-09-21

## 2023-09-21 PROBLEM — G90.81 SEROTONIN SYNDROME: Status: RESOLVED | Noted: 2020-08-10 | Resolved: 2023-09-21

## 2023-09-21 PROBLEM — M15.0 PRIMARY OSTEOARTHRITIS INVOLVING MULTIPLE JOINTS: Status: ACTIVE | Noted: 2023-01-11

## 2023-09-21 PROCEDURE — 99215 OFFICE O/P EST HI 40 MIN: CPT | Mod: 95 | Performed by: INTERNAL MEDICINE

## 2023-09-21 RX ORDER — TOPIRAMATE 25 MG/1
25 TABLET, FILM COATED ORAL 2 TIMES DAILY
Qty: 60 TABLET | Refills: 11 | Status: SHIPPED | OUTPATIENT
Start: 2023-09-21 | End: 2023-10-19

## 2023-09-21 RX ORDER — CYANOCOBALAMIN 1000 UG/ML
1000 INJECTION, SOLUTION INTRAMUSCULAR; SUBCUTANEOUS
Status: DISCONTINUED | OUTPATIENT
Start: 2023-09-22 | End: 2024-08-21

## 2023-09-21 RX ORDER — POTASSIUM CHLORIDE 1500 MG/1
TABLET, EXTENDED RELEASE ORAL
Qty: 180 TABLET | Refills: 3 | Status: SHIPPED | OUTPATIENT
Start: 2023-09-21 | End: 2023-11-16

## 2023-09-21 NOTE — COMMUNITY RESOURCES LIST (ENGLISH)
09/21/2023   [facility]  N/A  For questions about this resource list or additional care needs, please contact your primary care clinic or care manager.  Phone: [phone]   Email: N/A   Address: [facility] [place] [address]   Hours: N/A        Financial Stability       Rent and mortgage payment assistance  1  [place] [facility] Distance: 2.03 miles      [address] [place] [address]  Language: English, [place]er  Hours: Mon 9:30 AM - 11:30 AM , Tue 1:30 PM - 7:30 PM , Thu 9:00 AM - 7:00 PM , Fri 9:30 AM - 11:30 AM   Phone: (802) 610-5375 Email: info@[facility] Website: http://[facility].org/     2  [facility] Distance: 7.85 miles      In-Person, Phone/Virtual   [address] [place] [address]  Language: English, [place]er  Hours: Mon - Thu 8:00 AM - 4:00 PM  Fees: Free   Phone: (997) 829-5765 Email: center@[facility].iiko Website: https://www.[facility].org          Hotlines and Helplines       Hotline - Housing crisis  3  [facility] Distance: 23.5 miles      Phone/Virtual   [address] [place] [address]  Language: English  Hours: Mon - Sun Open 24 Hours   Phone: (493) 996-2664 Email: communications@[facility].org Website: https://oscs-[place].org/oursaviourshousing/          Housing       Coordinated Entry access point  4  [facility] Distance: 16.19 miles      In-Person, Phone/Virtual   [address] [place] [address]  Language: English, [place]er  Hours: Mon - Fri 8:30 AM - 4:30 PM  Fees: Free   Phone: (544) 468-3952 Email: info@[facility].org Website: https://www.[facility].org/locations/[facility]-clinic/     5  [facility]  Office [facility] Distance: 22.96 miles      Phone/Virtual   [address] [facility] [address] [place] [address]  Language: English  Hours: Mon - Fri 8:30 AM - 12:00 PM , Mon - Fri 1:00 PM - 4:00 PM  Fees: Free   Phone:  (226) 513-8412 Ext.2 Email: krissy@[facility].org Website: https://www.[facility] Springs[facility]usa.org/usn/     Drop-in center or day shelter  6  [facility] Distance: 14.68 miles      In-Person   [address] [place] [address]  Language: English  Hours: Mon - Fri 9:00 AM - 4:00 PM  Fees: Free   Phone: (326) 375-1041 Email: frontrowank@[employer] Website: http://[employer]ek     7  [facility] [place] [facility] Distance: 16.25 miles      In-Person   [address] [place] [address]  Language: English  Hours: Mon - Sun 9:00 AM - 5:30 PM  Fees: Free, Self Pay   Phone: (662) 800-6775 Email: info@[employer] Website: https://www.[facility].co.uk/locations/[facility]/     Housing search assistance  8  [facility] - Economic Support Distance: 0.71 miles      In-Person   [address] [place] [address]  Language: English  Hours: Mon - Fri 8:00 AM - 4:30 PM  Fees: Free   Phone: (307) 435-7467 Email: ESKYa@[facility]. Website: https://www.[facility]./787/Economic-Support     9  [facility] Distance: 10.79 miles      In-Person, Phone/Virtual   [address] [place] [address]  Language: English  Hours: Mon - Fri 8:00 AM - 4:30 PM  Fees: Free   Phone: (349) 415-4363 Email: humanresources@[employer].org Website: http://igobubble.org     Shelter for families  10  [facility] Distance: 13.16 miles      In-Person   [address] [place] [address]  Language: English  Hours: Mon - Fri 3:00 PM - 9:00 AM , Sat - Sun Open 24 Hours  Fees: Free   Phone: (171) 139-8140 Ext.1 Website: https://www.[facility].org/2020/07/03/emergency-family-shelter/     11  [facility] Distance: 13.41 miles      In-Person   [address] [place] [address]   Language: English  Hours: Mon - Sun Open 24 Hours  Fees: Free, Self Pay   Phone: (344) 592-7921 Email: Kaylah@[facility].org Website: http://www.[facility]merstate.ru/     Shelter for individuals  12  [facility] Distance: 13.41 miles      In-Person   [address] [place] [address]  Language: English  Hours: Mon - Sun Open 24 Hours  Fees: Free   Phone: (151) 327-7870 Email: Kaylah@[facility] Website: http://www.[facility].[employer].ru/     13  [facility] [place] [facility] Distance: 16.25 miles      In-Person   [address] [place] [address]  Language: English  Hours: Mon - Sun 5:00 PM - 10:00 AM  Fees: Free, Self Pay   Phone: (612) 725-3350 Email: info@[employer] Website: https://www.[employer]/locations/higher-ground-[place]/          Transportation       Free or low-cost transportation  14  [facility]  Office [facility] - Gas vouchers and transportation assistance Distance: 10.95 miles      In-Person, Phone/Virtual   [address] [place] [address]  Language: English  Hours: Mon - Fri 8:00 AM - 12:00 PM , Mon - Fri 1:00 PM - 4:00 PM  Fees: Free   Phone: (612) 997-3660 Email: julia@[facility].org Website: https://[facility].org/[facility]/social-services-office-[place]/     15  [employer] - [facility] Distance: 11.92 miles      In-Person   [address] [place] [address]  Language: English  Hours: Mon - Fri 9:00 AM - 5:00 PM  Fees: Free   Phone: (677) 446-7313 Email: info@[employer] Website: https://www.[employer]/     Transportation to medical appointments  16  [facility] Distance: 2.33 miles      In-Person   [address] [place] [address]  Language: English  Hours: Mon - Fri 9:00 AM - 4:00 PM  Fees: Free   Phone: (839) 548-4839  Email: contact@communitythreadmn.org Website: http://communityAudioPixelsean.org     17  [employer] [facility] [place] [employer] Distance: 14 miles      In-Person   [address] [place] [address]  Language: English  Hours: Mon - Fri 6:00 AM - 8:00 PM , Sat 8:00 AM - 6:00 PM , Sun 8:00 AM - 3:00 PM  Fees: Self Pay   Phone: (902) 527-6425 Email: zacharyluke@[employer] Website: https://[employer].net/Osiris TherapeuticsCity?Name=Aldo20Falls          Important Numbers & Websites       Emergency Services   911  City Services   311  Poison Control   (730) 923-4545  Suicide Prevention Lifeline   (254) 598-8214 (TALK)  Child Abuse Hotline   (132) 416-3997 (4-A-Child)  Sexual Assault Hotline   (564) 694-4738 (HOPE)  National Runaway Safeline   (938) 353-5560 (RUNAWAY)  All-Options Talkline   (586) 457-5340  Substance Abuse Referral   (673) 914-3287 (HELP)

## 2023-09-21 NOTE — PROGRESS NOTES
Clinic Care Coordination Contact  Community Health Worker Initial Outreach    CHW Initial Information Gathering:  Referral Source: PCP  Preferred Urgent Care: [facility] [place], [phone]  Current living arrangement:: I live in a private home with spouse  Informal Support system:: Family, Friends, Spouse  No PCP office visit in Past Year: No  Transportation means:: Family, Regular car  CHW Additional Questions  If ED/Hospital discharge, follow-up appointment scheduled as recommended?: N/A  Medication changes made following ED/Hospital discharge?: N/A  MyChart active?: Yes  Patient sent Social Determinants of Health questionnaire?: Yes    Patient accepts CC: Yes. Patient scheduled for assessment with CC RN on 9/26/2023 at 9:00 am. Patient noted desire to discuss recent CC referral for diabetic mgmt, diet- also patient would like to continue to see Vira as her PCP.     Order Questions    Question Answer   Reason for Referral: Complex Medical Concerns/Education   Complex Medical Concerns: Chronic Diagnosis - Use Comments   Clinical Staff have discussed the Care Coordination Referral with the patient and/or caregiver: Yes     Aparna MILES  Community Health Worker  [facility] Care Coordination  [place] [email or web address]  Office: [phone]

## 2023-09-21 NOTE — PROGRESS NOTES
Randi is a 68 year old female contacting the clinic today via video, who will use the platform: [employer] for the visit.  Phone # for Doximity, or if Amwell drops:   Telephone Information:   Mobile [phone]          ASSESSMENT and PLAN:  1. Type 2 diabetes mellitus without complication, without long-term current use of insulin (H)  A1c of 7.2 in 2020 confirms formal diagnosis of diabetes.  Well-controlled without medication.  Trial of topiramate for neuropathy and headaches.  Trial of B12 shots to supplement oral.  Proceed with orthotics and paperwork  - STATIN NOT PRESCRIBED (INTENTIONAL); Please choose reason not prescribed from choices below.  - Primary Care - Care Coordination Referral; Future  - cyanocobalamin injection 1,000 mcg  - topiramate (TOPAMAX) 25 MG tablet; Take 1 tablet (25 mg) by mouth 2 times daily  Dispense: 60 tablet; Refill: 11    2. Right foot pain  Rule out coexisting uric acid component and neuropathy  - Uric acid; Future    3. SOB (shortness of breath)  - KLOR-CON 20 MEQ CR tablet; Take 1 tablet (20 mEq total) by mouth 2 (two) times a day.  Dispense: 180 tablet; Refill: 3    4. Pulmonary hypertension (H)  - KLOR-CON 20 MEQ CR tablet; Take 1 tablet (20 mEq total) by mouth 2 (two) times a day.  Dispense: 180 tablet; Refill: 3    5. Postablative hypothyroidism  Consider empiric increase in Synthroid    6. Hereditary hemochromatosis (H)  Phlebotomies done routinely    7. Psoriatic arthropathy (H)  Consider uric acid    8. Morbid obesity (H)  Consider Ozempic, Mounjaro, or Wegovy    9. 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 Hansen             Return in about 4 months (around 1/21/2024) for using a video visit.       CHIEF COMPLAINT:  Chief Complaint   Patient presents with    Follow Up    Diabetes     Would like to discuss diabetes and diet. Pt states that she needs diabetic inserts and shoes and having difficulties obtaining these. Pt reports having foot surgery (Oct 23 2023). Has pre op appointment 10/19/23           9/21/2023     8:17 AM   Additional Questions   Roomed by Tyesha       HISTORY OF PRESENT ILLNESS:  Randi is a 68 year old female contacting the clinic today via video for request for care coordination.  She has neuropathy and foot problems and has used orthotics for years.  There has been controversy as to whether she has had diabetes, but an A1c was 7.2 in 2020.  She manages her diabetes with diet and exercise.  She does not take diabetic medication.  She did see Dr. Camarillo a few weeks ago for a foot exam.  She reviewed Medicare requirements and we discussed how to fill out the paperwork.  We also discussed a referral to our care coordination service for her multiple issues    Has hemochromatosis and undergoes regular phlebotomies    Has severe neuropathy.  Gabapentin caused side effects.  Has not tried topiramate, Tegretol or others    Takes brand thyroid.  Was on 175 mcg a few years ago and now is on 150    Discussed possible role of uric acid with diffuse arthropathy.  Previously diagnosed as psoriatic but apparently that was overturned    History of Present Illness       Reason for visit:  Follow up    She eats 2-3 servings of fruits and vegetables daily.She consumes 0 sweetened beverage(s) daily.She exercises with enough effort to increase her heart rate 9 or less minutes per day.  She exercises with enough effort to increase her heart rate 3 or less days per week.   She is taking medications regularly.      REVIEW OF SYSTEMS:   Mood marginally controlled without use of serotonin medications due to previous side  "effects    PFSH:  Social History     Social History Narrative    Not on file      and lives with her     TOBACCO USE:  History   Smoking Status    Former    Packs/day: 2.50    Years: 20.00    Types: Cigarettes    Start date: 5/1/1971    Quit date: 6/29/2000   Smokeless Tobacco    Never       VITALS:  There were no vitals filed for this visit.  There were no vitals taken for this visit. Estimated body mass index is 36.98 kg/m  as calculated from the following:    Height as of 8/15/23: 1.676 m (5' 5.98\").    Weight as of 8/31/23: 103.9 kg (229 lb).    PHYSICAL EXAM:  (observations via Video)  Talkative.  Overweight    MEDICATIONS:   Current Outpatient Medications   Medication Sig Dispense Refill    albuterol (PROVENTIL) (2.5 MG/3ML) 0.083% neb solution Take 1 vial (2.5 mg) by nebulization every 4 hours as needed for shortness of breath / dyspnea or wheezing 360 mL 5    albuterol (VENTOLIN HFA) 108 (90 Base) MCG/ACT inhaler Inhale 2 puffs into the lungs every 6 hours 18 g 11    benzonatate (TESSALON) 200 MG capsule Take 1 capsule (200 mg) by mouth 3 times daily as needed for cough 30 capsule 1    Cholecalciferol (VITAMIN D3) 250 MCG (19989 UT) TABS Take 1 tablet by mouth daily 12594/day      Cyanocobalamin (VITAMIN B-12) 5000 MCG SUBL Place 2-3 sprays under the tongue daily Unknown dose. 2 or 3 sprays/day      ethacrynic acid (EDECRIN) 25 MG tablet Take 1 tablet (25 mg) by mouth every other day 45 tablet 3    Fluticasone-Umeclidin-Vilanterol (TRELEGY ELLIPTA) 200-62.5-25 MCG/INH oral inhaler Inhale 1 puff into the lungs daily 4 each 3    KLOR-CON 20 MEQ CR tablet Take 1 tablet (20 mEq total) by mouth 2 (two) times a day. 180 tablet 3    magnesium 250 MG tablet Take 1 tablet by mouth 2 times daily      medical cannabis (Patient's own supply) See Admin Instructions (The purpose of this order is to document that the patient reports taking medical cannabis.  This is not a prescription, and is not used to " certify that the patient has a qualifying medical condition.)  Flower      omeprazole (PRILOSEC) 20 MG DR capsule Take 1 capsule (20 mg) by mouth daily 90 capsule 3    oxyCODONE (ROXICODONE) 5 MG tablet Take 1 tablet (5 mg) by mouth every 6 hours as needed for pain (chronic pain) Dispense/start 9/15/23 56 tablet 0    rOPINIRole (REQUIP) 2 MG tablet One tab 3 pm, one bedtime, and one during night on waking 90 tablet 3    STATIN NOT PRESCRIBED (INTENTIONAL) Please choose reason not prescribed from choices below.      SYNTHROID 150 MCG tablet Take 1 tablet (150 mcg) by mouth daily 90 tablet 4    topiramate (TOPAMAX) 25 MG tablet Take 1 tablet (25 mg) by mouth 2 times daily 60 tablet 11    vitamin E 400 units TABS Take 800 Units by mouth daily         Outside Notes summarized:   Labs, x-rays, cardiology, GI tests reviewed: A1c of 7.2 in 2020  Recent Labs   Lab Test 07/25/23  1643 07/14/23  1110 07/05/23  1414 05/01/23  0742 01/18/23  1307 06/07/22  0735 02/23/22  1339 12/13/21  1407 11/16/21  0818   HGB 16.6*  --   --  18.1* 17.2*   < > 15.4   < > 16.5*   WBC 7.6  --   --  11.0 6.4   < > 6.0   < > 7.4   NA  --   --  142  --  141   < > 139   < > 138   POTASSIUM  --   --  4.2  --  4.5   < > 3.8   < > 4.1   CR  --   --  0.65  --  0.64   < > 0.61   < > 0.57   A1C  --  5.8*  --   --  5.6   < > 5.5  --   --    B12 987  --   --   --   --   --  373  --   --    TSH  --   --  3.18  --  1.58   < > 1.14  --   --    VITDT 38  --   --   --   --   --  43  --   --    CRP  --   --   --   --   --   --   --   --  <2.9    < > = values in this interval not displayed.     Lab Results   Component Value Date    DEUCW50UWD Negative 12/17/2021    BMHAA94YIZ POSITIVE 06/04/2021     Lab Results   Component Value Date    CHOL 240 07/05/2023    CHOL 180 05/18/2021     New orders:   Orders Placed This Encounter   Procedures    Uric acid    Primary Care - Care Coordination Referral       Independent review of:     Laith Constantino MD  [facility]  [facility]    Video-Visit Details  Type of service:  Video Visit  Patient has given verbal consent to a Video visit?  Yes  How would you like to obtain your AVS?  MyChart  Will anyone else be joining your video visit, giving supplemental history? No    Originating location (pt location): Home    Distant Location (provider location):  Off-site    Video Start Time: 9:14 AM  Video End time:  9:58 AM  Face to face plus orders: 44 minutes  Documentation time:  3 minutes     The visit lasted a total of 47 minutes

## 2023-09-21 NOTE — COMMUNITY RESOURCES LIST (ENGLISH)
09/21/2023   [facility]  N/A  For questions about this resource list or additional care needs, please contact your primary care clinic or care manager.  Phone: [phone]   Email: N/A   Address: [facility] [place] [address]   Hours: N/A        Financial Stability       Rent and mortgage payment assistance  1  [facility] Distance: 2.03 miles      [address] [place] [address]  Language: English, Russian  Hours: Mon 9:30 AM - 11:30 AM , Tue 1:30 PM - 7:30 PM , Thu 9:00 AM - 7:00 PM , Fri 9:30 AM - 11:30 AM   Phone: (416) 490-5469 Email: info@[facility] Website: http://[facility].org/     2  [facility] Distance: 7.85 miles      In-Person, Phone/Virtual   [address] [place] [address]  Language: English, Russian  Hours: Mon - Thu 8:00 AM - 4:00 PM  Fees: Free   Phone: (597) 849-4575 Email: center@[facility].[employer] Website: https://www.[facility].org          Hotlines and Helplines       Hotline - Housing crisis  3  [facility] Distance: 23.5 miles      Phone/Virtual   [address] [place] [address]  Language: English  Hours: Mon - Sun Open 24 Hours   Phone: (434) 985-8262 Email: communications@[facility].org Website: https://[facility].org/oursaviourshousing/          Housing       Coordinated Entry access point  4  [facility] Distance: 16.19 miles      In-Person, Phone/Virtual   [address] [place] [address]  Language: English, Russian  Hours: Mon - Fri 8:30 AM - 4:30 PM  Fees: Free   Phone: (896) 693-1469 Email: info@[facility].org Website: https://www.[facility].org/locations/[facility]-clinic/     5  [facility]  Office [facility] [place] Distance: 22.96 miles      Phone/Virtual   [address] [facility] [address] [place] [address]  Language: English  Hours: Mon - Fri 8:30 AM - 12:00 PM , Mon - Fri 1:00 PM - 4:00 PM  Fees: Free   Phone:  (942) 898-8011 Ext.2 Email: krissy@[facility].org Website: https://www.[facility].org/usn/     Drop-in center or day shelter  6  [facility] Distance: 14.68 miles      In-Person   [address] [place] [address]  Language: English  Hours: Mon - Fri 9:00 AM - 4:00 PM  Fees: Free   Phone: (467) 450-6111 Email: frontrowank@[employer] Website: http://[employer]     7  [facility] [place] [facility] Distance: 16.25 miles      In-Person   [address] [place] [address]  Language: English  Hours: Mon - Sun 9:00 AM - 5:30 PM  Fees: Free, Self Pay   Phone: (440) 525-8325 Email: info@[employer] Website: https://www.[facility] Hospital[facility]/locations/[facility]/     Housing search assistance  8  [facility] - Economic Support Distance: 0.71 miles      In-Person   [address] [place] [address]  Language: English  Hours: Mon - Fri 8:00 AM - 4:30 PM  Fees: Free   Phone: (829) 105-7005 Email: Ultimate Shoppera@[facility]. Website: https://www.[facility]./787/Economic-Support     9  [facility] Distance: 10.79 miles      In-Person, Phone/Virtual   [address] [place] [address]  Language: English  Hours: Mon - Fri 8:00 AM - 4:30 PM  Fees: Free   Phone: (152) 584-8854 Email: humanresources@[employer].org Website: http://[employer].org     Shelter for families  10  [facility] Distance: 13.16 miles      In-Person   [address] [place] [address]  Language: English  Hours: Mon - Fri 3:00 PM - 9:00 AM , Sat - Sun Open 24 Hours  Fees: Free   Phone: (625) 950-6254 Ext.1 Website: https://www.[facility].org/2020/07/03/emergency-family-shelter/     11  [facility] Distance: 13.41 miles      In-Person   [address] [place] [address]   Language: English  Hours: Mon - Sun Open 24 Hours  Fees: Free, Self Pay   Phone: (840) 935-4169 Email: Kaylah@[facility].org Website: http://www.[employer]/     Shelter for individuals  12  [facility] Distance: 13.41 miles      In-Person   [address] [place] [address]  Language: English  Hours: Mon - Sun Open 24 Hours  Fees: Free   Phone: (269) 569-6175 Email: Kaylah@[facility] Website: http://www.[employer]/     13  [facility] [place] [facility] Distance: 16.25 miles      In-Person   [address] [place] [address]  Language: English  Hours: Mon - Sun 5:00 PM - 10:00 AM  Fees: Free, Self Pay   Phone: (706) 228-5818 Email: info@[employer] Website: https://www.[employer]/locations/[facility]/          Transportation       Free or low-cost transportation  14  [facility]  Office [facility] - Gas vouchers and transportation assistance Distance: 10.95 miles      In-Person, Phone/Virtual   [address] [place] [address]  Language: English  Hours: Mon - Fri 8:00 AM - 12:00 PM , Mon - Fri 1:00 PM - 4:00 PM  Fees: Free   Phone: (819) 321-7068 Email: julia@[facility].org Website: https://[facility].org/[facility]/social-services-office-[place]/     15  [employer] [facility] Distance: 11.92 miles      In-Person   [address] [place] [address]  Language: English  Hours: Mon - Fri 9:00 AM - 5:00 PM  Fees: Free   Phone: (627) 327-6635 Email: info@[employer] Website: https://www.[employer]/     Transportation to medical appointments  16  [facility] Distance: 2.33 miles      In-Person   [address] [place] [address]  Language: English  Hours: Mon - Fri 9:00 AM - 4:00 PM  Fees: Free   Phone: (620) 800-7036  Email: contact@communitythreadmn.org Website: http://communityPagaTuAlquilerean.org     17  [employer] [facility] [place] [employer] Distance: 14 miles      In-Person   [address] [place] [address]  Language: English  Hours: Mon - Fri 6:00 AM - 8:00 PM , Sat 8:00 AM - 6:00 PM , Sun 8:00 AM - 3:00 PM  Fees: Self Pay   Phone: (592) 853-3019 Email: zacharyluke@[employer] Website: https://"[employer].net/[employer] SimpleCity?Name=Aldo20Falls          Important Numbers & Websites       Emergency Services   911  City Services   311  Poison Control   (583) 883-2287  Suicide Prevention Lifeline   (425) 585-8732 (TALK)  Child Abuse Hotline   (334) 649-4619 (4-A-Child)  Sexual Assault Hotline   (358) 562-4201 (HOPE)  National Runaway Safeline   (537) 268-5021 (RUNAWAY)  All-Options Talkline   (494) 101-4117  Substance Abuse Referral   (269) 301-8433 (HELP)

## 2023-09-21 NOTE — PROGRESS NOTES
Winnie is a 68 year old who is being evaluated via a billable video visit.      How would you like to obtain your AVS? MyChart  If the video visit is dropped, the invitation should be resent by: Text to cell phone: [phone]  Will anyone else be joining your video visit? No  {If patient encounters technical issues they should call [phone] :035598}        {PROVIDER CHARTING PREFERENCE:523390}    Subjective   Winnie is a 68 year old, presenting for the following health issues:  Follow Up and Diabetes (Would like to discuss diabetes and diet. Pt states that she needs diabetic inserts and shoes and having difficulties obtaining these. Pt reports having foot surgery (Oct 23 2023). Has pre op appointment 10/19/23)        9/21/2023     8:17 AM   Additional Questions   Roomed by Tyesha       History of Present Illness       Reason for visit:  Follow up    She eats 2-3 servings of fruits and vegetables daily.She consumes 0 sweetened beverage(s) daily.She exercises with enough effort to increase her heart rate 9 or less minutes per day.  She exercises with enough effort to increase her heart rate 3 or less days per week.   She is taking medications regularly.       {MA/LPN/RN Pre-Provider Visit Orders- hCG/UA/Strep (Optional):687030}  {SUPERLIST (Optional):268854}  {additonal problems for provider to add (Optional):882276}      Review of Systems   {ROS COMP (Optional):171864}      Objective    Vitals - Patient Reported  Pain Loc: Neck (neck, shoulder, arm, hands (bilateral))        Physical Exam   {video visit exam brief selected:662542}    {Diagnostic Test Results (Optional):202330}    {AMBULATORY ATTESTATION (Optional):846797}        Video-Visit Details    Type of service:  Video Visit   Video Start Time: {video visit start/end time for provider to select:023579}  Video End Time:{video visit start/end time for provider to select:949936}    Originating Location (pt. Location): Home  {PROVIDER LOCATION On-site should be  selected for visits conducted from your clinic location or adjoining [facility] hospital, academic office, or other nearby [facility] building. Off-site should be selected for all other provider locations, including home:69[address]}  Distant Location (provider location):  Off-site  Platform used for Video Visit: Tara

## 2023-09-21 NOTE — PATIENT INSTRUCTIONS
Repeat trial of vitamin B12 shots for neuropathy    Topiramate 25 mg twice daily    Refill potassium    Uric acid level and brief discussion of uric acid arthropathy    Consider increase in Synthroid based on symptoms    Send me orthotic forms filled out in conjunction with Dr. Hansen

## 2023-09-22 ENCOUNTER — TELEPHONE (OUTPATIENT)
Dept: FAMILY MEDICINE | Facility: CLINIC | Age: 68
End: 2023-09-22
Payer: MEDICARE

## 2023-09-22 NOTE — TELEPHONE ENCOUNTER
[facility] phone call message- general phone call:    Reason for call: PT is requesting call back from Camille. She would like to discuss a letter that was sent to her.     Return call needed: Yes    OK to leave a message on voice mail? Yes    Primary language: English      needed? No    Call taken on September 22, 2023 at 8:55 AM by Michael Rowe
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
## 2023-09-25 ENCOUNTER — APPOINTMENT (OUTPATIENT)
Dept: LAB | Facility: CLINIC | Age: 68
End: 2023-09-25
Attending: INTERNAL MEDICINE
Payer: MEDICARE

## 2023-09-25 ENCOUNTER — ANCILLARY PROCEDURE (OUTPATIENT)
Dept: MAMMOGRAPHY | Facility: CLINIC | Age: 68
End: 2023-09-25
Payer: MEDICARE

## 2023-09-25 ENCOUNTER — INFUSION THERAPY VISIT (OUTPATIENT)
Dept: ONCOLOGY | Facility: CLINIC | Age: 68
End: 2023-09-25
Attending: INTERNAL MEDICINE
Payer: MEDICARE

## 2023-09-25 ENCOUNTER — OFFICE VISIT (OUTPATIENT)
Dept: FAMILY MEDICINE | Facility: CLINIC | Age: 68
End: 2023-09-25
Payer: MEDICARE

## 2023-09-25 VITALS
WEIGHT: 229 LBS | HEART RATE: 107 BPM | SYSTOLIC BLOOD PRESSURE: 111 MMHG | OXYGEN SATURATION: 93 % | TEMPERATURE: 97.7 F | BODY MASS INDEX: 36.98 KG/M2 | RESPIRATION RATE: 17 BRPM | DIASTOLIC BLOOD PRESSURE: 71 MMHG

## 2023-09-25 VITALS
SYSTOLIC BLOOD PRESSURE: 132 MMHG | OXYGEN SATURATION: 94 % | DIASTOLIC BLOOD PRESSURE: 85 MMHG | RESPIRATION RATE: 18 BRPM | HEART RATE: 106 BPM | TEMPERATURE: 98.2 F | WEIGHT: 229.3 LBS | BODY MASS INDEX: 37.03 KG/M2

## 2023-09-25 DIAGNOSIS — Z71.89 COORDINATION OF COMPLEX CARE: Primary | ICD-10-CM

## 2023-09-25 DIAGNOSIS — R92.8 ABNORMAL FINDING ON BREAST IMAGING: Primary | ICD-10-CM

## 2023-09-25 DIAGNOSIS — E83.110 HEREDITARY HEMOCHROMATOSIS (H): Primary | ICD-10-CM

## 2023-09-25 DIAGNOSIS — E11.9 TYPE 2 DIABETES MELLITUS WITHOUT COMPLICATION, WITHOUT LONG-TERM CURRENT USE OF INSULIN (H): ICD-10-CM

## 2023-09-25 DIAGNOSIS — E83.119 HEMOCHROMATOSIS, UNSPECIFIED HEMOCHROMATOSIS TYPE: ICD-10-CM

## 2023-09-25 DIAGNOSIS — M10.9 URIC ACID ARTHROPATHY: Primary | ICD-10-CM

## 2023-09-25 DIAGNOSIS — Z09 ENCOUNTER FOR FOLLOW-UP: ICD-10-CM

## 2023-09-25 DIAGNOSIS — M79.671 RIGHT FOOT PAIN: ICD-10-CM

## 2023-09-25 DIAGNOSIS — R92.8 ABNORMAL FINDING ON BREAST IMAGING: ICD-10-CM

## 2023-09-25 LAB
BASOPHILS # BLD AUTO: 0.1 10E3/UL (ref 0–0.2)
BASOPHILS NFR BLD AUTO: 1 %
CREAT SERPL-MCNC: 0.69 MG/DL (ref 0.51–0.95)
EGFRCR SERPLBLD CKD-EPI 2021: >90 ML/MIN/1.73M2
EOSINOPHIL # BLD AUTO: 0.1 10E3/UL (ref 0–0.7)
EOSINOPHIL NFR BLD AUTO: 1 %
ERYTHROCYTE [DISTWIDTH] IN BLOOD BY AUTOMATED COUNT: 12.5 % (ref 10–15)
FERRITIN SERPL-MCNC: 61 NG/ML (ref 11–328)
HCT VFR BLD AUTO: 50.3 % (ref 35–47)
HGB BLD-MCNC: 17.6 G/DL (ref 11.7–15.7)
IMM GRANULOCYTES # BLD: 0.1 10E3/UL
IMM GRANULOCYTES NFR BLD: 1 %
LYMPHOCYTES # BLD AUTO: 1.3 10E3/UL (ref 0.8–5.3)
LYMPHOCYTES NFR BLD AUTO: 13 %
MCH RBC QN AUTO: 33.4 PG (ref 26.5–33)
MCHC RBC AUTO-ENTMCNC: 35 G/DL (ref 31.5–36.5)
MCV RBC AUTO: 95 FL (ref 78–100)
MONOCYTES # BLD AUTO: 0.6 10E3/UL (ref 0–1.3)
MONOCYTES NFR BLD AUTO: 6 %
NEUTROPHILS # BLD AUTO: 8.1 10E3/UL (ref 1.6–8.3)
NEUTROPHILS NFR BLD AUTO: 78 %
NRBC # BLD AUTO: 0 10E3/UL
NRBC BLD AUTO-RTO: 0 /100
PLATELET # BLD AUTO: 244 10E3/UL (ref 150–450)
RBC # BLD AUTO: 5.27 10E6/UL (ref 3.8–5.2)
URATE SERPL-MCNC: 6.7 MG/DL (ref 2.4–5.7)
WBC # BLD AUTO: 10.3 10E3/UL (ref 4–11)

## 2023-09-25 PROCEDURE — 36415 COLL VENOUS BLD VENIPUNCTURE: CPT

## 2023-09-25 PROCEDURE — 77066 DX MAMMO INCL CAD BI: CPT | Performed by: RADIOLOGY

## 2023-09-25 PROCEDURE — 82565 ASSAY OF CREATININE: CPT

## 2023-09-25 PROCEDURE — G0279 TOMOSYNTHESIS, MAMMO: HCPCS | Performed by: RADIOLOGY

## 2023-09-25 PROCEDURE — 99213 OFFICE O/P EST LOW 20 MIN: CPT | Mod: GC | Performed by: STUDENT IN AN ORGANIZED HEALTH CARE EDUCATION/TRAINING PROGRAM

## 2023-09-25 PROCEDURE — 85025 COMPLETE CBC W/AUTO DIFF WBC: CPT

## 2023-09-25 PROCEDURE — 84550 ASSAY OF BLOOD/URIC ACID: CPT

## 2023-09-25 PROCEDURE — 82728 ASSAY OF FERRITIN: CPT | Performed by: INTERNAL MEDICINE

## 2023-09-25 RX ORDER — IOPAMIDOL 755 MG/ML
103 INJECTION, SOLUTION INTRAVASCULAR ONCE
Status: COMPLETED | OUTPATIENT
Start: 2023-09-25 | End: 2023-09-25

## 2023-09-25 RX ORDER — ALLOPURINOL 300 MG/1
300 TABLET ORAL DAILY
Qty: 30 TABLET | Refills: 11 | Status: SHIPPED | OUTPATIENT
Start: 2023-09-25 | End: 2023-11-16

## 2023-09-25 RX ADMIN — IOPAMIDOL 130 ML: 755 INJECTION, SOLUTION INTRAVASCULAR at 13:16

## 2023-09-25 ASSESSMENT — PAIN SCALES - GENERAL: PAINLEVEL: SEVERE PAIN (7)

## 2023-09-25 NOTE — TELEPHONE ENCOUNTER
RN called pt back and pt was in clinic. Went into room and talked with pt. Pt saw Dr. Constantino regarding getting inserts and has some forms that she would like sent to him. RN faxed forms to Dr. Constantino at [facility] at [phone]. Received confirmation that fax went through.    Camille Beltran RN

## 2023-09-25 NOTE — PROGRESS NOTES
Infusion Nursing Note:  Randi Cleary presents today for Possible phlebotomy- not given today.    Patient seen by provider today: No   present during visit today: Not Applicable.    Note: Patient reports to feeling fatigued frequently.  Patient last seen by Dr. Pablo on 7/25/23, where it was discussed that patient continue to have increased restless legs and fatigue so ferritin parameters increased from >50 to >150.  Patient's ferritin at 61 today so does not meet these new parameters.  Patient is concerns about not receiving her phlebotomy today due to her increased Hgb at 17.6.  Patient requesting to have phlebotomy today.  This NR paged Dr. Pablo.    TORB: Dr. Pablo/ANTONIO Murphy RN on 9/25/23 @ 1131:  - Hold phlebotomy today     Patient updated and it was reviewed with the patient that no phlebotomy was needed as we want to prevent patient from having her ferritin drop too low.  Patient verbalizes understanding of the plan. Patient has her next lab and Dr. Pablo visit scheduled for 1/23/24.  Patient encouraged to contact clinic if needed before that appointment.       Intravenous Access:  Peripheral IV placed. Placed by lab.  Remains in place for upcoming mammogram at 1300.    Treatment Conditions:  Lab Results   Component Value Date    HGB 17.6 (H) 09/25/2023    WBC 10.3 09/25/2023    ANEU 4.7 02/05/2021    ANEUTAUTO 8.1 09/25/2023     09/25/2023        Lab Results   Component Value Date     07/05/2023    POTASSIUM 4.2 07/05/2023    CR 0.69 09/25/2023    LINDA 10.0 07/05/2023    BILITOTAL 0.4 01/18/2023    ALBUMIN 4.5 07/05/2023    ALT 18 01/18/2023    AST 27 01/18/2023       Results reviewed, labs did NOT meet treatment parameters: ferritin 61.      Discharge Plan:   Patient declined prescription refills.  Discharge instructions reviewed with: Patient.  Patient and/or family verbalized understanding of discharge instructions and all questions answered.  AVS to patient via  [employer].  Patient will return 1/23/24 for labs, to see Dr. Pablo next appointment.   Patient discharged in stable condition accompanied by: self.  Departure Mode: Ambulatory with cane.      Jeannette Murphy RN

## 2023-09-25 NOTE — NURSING NOTE
Chief Complaint   Patient presents with    Blood Draw     Vitals, blood drawn and PIV placed by RDRONALD, vascular RN. Pt checked into appt.      MARILEE Leggett LPN

## 2023-09-25 NOTE — COMMUNITY RESOURCES LIST (ENGLISH)
09/25/2023   [facility]  N/A  For questions about this resource list or additional care needs, please contact your primary care clinic or care manager.  Phone: [phone]   Email: N/A   Address: [facility] [place] [address]   Hours: N/A        Financial Stability       Rent and mortgage payment assistance  1  [facility] Distance: 2.03 miles      [address] [place] [address]  Language: English, Albanian  Hours: Mon 9:30 AM - 11:30 AM , Tue 1:30 PM - 7:30 PM , Thu 9:00 AM - 7:00 PM , Fri 9:30 AM - 11:30 AM   Phone: (208) 897-9938 Email: info@[facility] Website: http://[facility].org/     2  [facility] Distance: 7.85 miles      In-Person, Phone/Virtual   [address] [place] [address]  Language: English, Albanian  Hours: Mon - Thu 8:00 AM - 4:00 PM  Fees: Free   Phone: (399) 361-6915 Email: center@[employer] Website: https://www.[facility].org          Hotlines and Helplines       Hotline - Housing crisis  3  [facility] Distance: 23.5 miles      Phone/Virtual   [address] [place] [address]  Language: English  Hours: Mon - Sun Open 24 Hours   Phone: (889) 446-7252 Email: communications@[facility].org Website: https://[facility]-[place].org/oursaviourshousing/          Housing       Coordinated Entry access point  4  [facility] Distance: 16.19 miles      In-Person, Phone/Virtual   [address] [place] [address]  Language: English, Albanian  Hours: Mon - Fri 8:30 AM - 4:30 PM  Fees: Free   Phone: (305) 455-8429 Email: info@[facility].org Website: https://www.[facility].org/locations/[facility]-clinic/     5  [facility]  Office [facility] Distance: 22.96 miles      Phone/Virtual   [address] [facility] [address] [place] [address]  Language: English  Hours: Mon - Fri 8:30 AM - 12:00 PM , Mon - Fri 1:00 PM - 4:00 PM  Fees: Free   Phone:  (895) 365-1786 Ext.2 Email: krissy@[facility].org Website: https://www.[facility].org/usn/     Drop-in center or day shelter  6  [facility] Distance: 14.68 miles      In-Person   [address] [place] [address]  Language: English  Hours: Mon - Fri 9:00 AM - 4:00 PM  Fees: Free   Phone: (491) 182-9404 Email: cisco@[employer] Website: http://[place] [place] [employer]     7  [facility] [place] [facility] [place] [facility] Distance: 16.25 miles      In-Person   [address] [place] [address]  Language: English  Hours: Mon - Sun 9:00 AM - 5:30 PM  Fees: Free, Self Pay   Phone: (450) 637-3745 Email: info@[employer] Website: https://www.[employer]./locations/[facility]/     Housing search assistance  8  [facility] - Homeless Resources and Housing Information Distance: 0.71 miles      In-Person, Phone/Virtual   [address] [place] [address]  Language: English  Hours: Mon - Fri 8:00 AM - 4:30 PM  Fees: Free   Phone: (273) 993-7290 Email: bel@[facility]. Website: https://www.[facility]./469/Community-Services     9  [facility] - Housing Resources Distance: 10.79 miles      In-Person, Phone/Virtual   [address] [place] [address]  Language: English  Hours: Mon - Fri 8:00 AM - 4:30 PM  Fees: Free   Phone: (346) 754-1294 Email: office@[employer] Website: http://[employer].org     Shelter for families  10  [facility] Distance: 13.16 miles      In-Person   [address] [place] [address]  Language: English  Hours: Mon - Fri 3:00 PM - 9:00 AM , Sat - Sun Open 24 Hours  Fees: Free   Phone: (551) 907-2983 Ext.1 Website: https://www.[facility].org/2020/07/03/emergency-family-shelter/     11  [facility]  Distance: 13.41 miles      In-Person   [address] [place] [address]  Language: English  Hours: Mon - Sun Open 24 Hours  Fees: Free, Self Pay   Phone: (145) 869-7452 Email: Kaylah@[facility] Website: http://www.[employer]/     Shelter for individuals  12  [facility] Distance: 13.41 miles      In-Person   [address] [place] [address]  Language: English  Hours: Mon - Sun Open 24 Hours  Fees: Free   Phone: (244) 878-5586 Email: Kaylah@[facility].com.[facility] Website: http://www.[employer]/     13  [facility] [place] [facility] [place] [facility] Distance: 16.25 miles      In-Person   [address] [place] [address]  Language: English  Hours: Mon - Sun 5:00 PM - 10:00 AM  Fees: Free, Self Pay   Phone: (712) 620-9831 Email: info@[employer] s.[employer].l Website: https://www.[employer].org/locations/higher-[facility]/          Transportation       Free or low-cost transportation  14  [facility]  Office [facility] - Gas vouchers and transportation assistance Distance: 10.95 miles      In-Person, Phone/Virtual   [address] [place] [address]  Language: English  Hours: Mon - Fri 8:00 AM - 12:00 PM , Mon - Fri 1:00 PM - 4:00 PM  Fees: Free   Phone: (321) 576-6276 Email: julia@[facility].org Website: https://[facility].org/[facility]/social-services-office-[place]/     15  [employer] [facility] Distance: 11.92 miles      In-Person   [address] [place] [address]  Language: English  Hours: Mon - Fri 9:00 AM - 5:00 PM  Fees: Free   Phone: (906) 988-7691 Email: info@[employer]WISE s.r.l Website: https://www.[employer].WISE s.r.l/     Transportation to medical appointments  [address] [place] [address] Distance: 2.33 miles      In-Person   [address] [place] [address]  Language: English   Hours: Mon - Fri 9:00 AM - 4:00 PM  Fees: Free   Phone: (960) 510-2800 Email: contact@"[employer]"n.org Website: http://[employer].org     17  [employer] [facility] [place] [employer] Distance: 14 miles      In-Person   [address] [place] [address]  Language: English  Hours: Mon - Fri 6:00 AM - 8:00 PM , Sat 8:00 AM - 6:00 PM , Sun 8:00 AM - 3:00 PM  Fees: Self Pay   Phone: (724) 651-5788 Email: brieShopVisible@[employer] Website: https://ELVPHD.[employer](Cabeo)/TrovaGeneCity?Name=[place]%20[place]          Important Numbers & Websites       Emergency Services   911  [facility] Services   311  Poison Control   (346) 483-5857  Suicide Prevention Lifeline   (190) 567-2912 (TALK)  Child Abuse Hotline   (977) 304-6784 (4-A-Child)  Sexual Assault Hotline   (736) 313-1869 (HOPE)  National Runaway Safeline   (101) 768-6225 (RUNAWAY)  All-Options Talkline   (253) 204-7051  Substance Abuse Referral   (562) 209-9080 (HELP)

## 2023-09-25 NOTE — PROGRESS NOTES
Assessment & Plan     Coordination of complex care  Reviewed upcoming appointments  Helping fax orthotic paperwork to the appropriate provider  Counseled on coordination of her primary care, including selecting a main primary care clinic    Type 2 diabetes mellitus without complication, without long-term current use of insulin (H)  Reviewed plan from her 9/21 visit with other provider.  Discussed her intention to pursue dietitian care, offered our support of this.      Return in about 4 weeks (around 10/23/2023) for Follow up, with me for pre-op exam.    Yennifer Cordova MD  [facility] SAM Zohu is a 68 year old, presenting for the following health issues:  Diabetes (Follow up )        9/25/2023     3:53 PM   Additional Questions   Roomed by azeb   Accompanied by self       HPI     Patient presents for completion of orthotic forms  Reports upcoming surgery with [facility] for R foot/ankle  Has been trying to get orthotics and documentation of need  Reviewed that this had been completed at recent virtual visit with other provider on 9/21  Also discussed upcoming visits at our clinic including preop with me in a month  Clarified her intentions to proceed with care in our clinic or the other outside clinic  Clarified her ongoing psychiatry treatment        Objective    /71   Pulse 107   Temp 97.7  F (36.5  C)   Resp 17   Wt 103.9 kg (229 lb)   SpO2 93%   BMI 36.98 kg/m    Body mass index is 36.98 kg/m .  Physical Exam   GENERAL: Healthy, alert and no distress  EYES: Eyes grossly normal to inspection.  No discharge or erythema, or obvious scleral/conjunctival abnormalities.  RESP: No audible wheeze, cough, or visible cyanosis.  No visible retractions or increased work of breathing.    SKIN: Visible skin clear. No significant rash, abnormal pigmentation or lesions.  NEURO: Cranial nerves grossly intact.  Mentation and speech appropriate for age.  PSYCH: Mentation mildly  confused, affect normal/bright, j tangential      ----- Service Performed and Documented by Resident or Fellow ------

## 2023-09-25 NOTE — PATIENT INSTRUCTIONS
Patient Education   Here is the plan from today's visit    1. Coordination of complex care    2. Type 2 diabetes mellitus without complication, without long-term current use of insulin (H)      Please call or return to clinic if your symptoms don't go away.    Follow up plan  Return in about 4 weeks (around 10/23/2023) for Follow up, with me for pre-op exam.    Thank you for coming to [facility] today.  Lab Testing:  **If you had lab testing today and your results are reassuring or normal they will be mailed to you or sent through [employer] within 7 days.   **If the lab tests need quick action we will call you with the results.  **If you are having labs done on a different day, please call [phone] to schedule at [facility] or [phone] for other [facility] Outpatient Lab locations. Labs do not offer walk-in appointments.  The phone number we will call with results is # [phone] (home) [phone] (work). If this is not the best number please call our clinic and change the number.  Medication Refills:  If you need any refills please call your pharmacy and they will contact us.   If you need to  your refill at a new pharmacy, please contact the new pharmacy directly. The new pharmacy will help you get your medications transferred faster.   Scheduling:  If you have any concerns about today's visit or wish to schedule another appointment please call our office during normal business hours [phone] (8-5:00 M-F). If you can no longer make a scheduled visit, please cancel via [employer] or call us to cancel.   If a referral was made to an [facility] specialty provider and you do not get a call from central scheduling, please refer to directions on your visit summary or call our office during normal business hours for assistance.   If a Mammogram was ordered for you at the Breast Center call [phone] to schedule or change your appointment.  If you had an XRay/CT/Ultrasound/MRI  ordered the number is [phone] to schedule or change your radiology appointment.   [facility] has limited ultrasound appointments available on Wednesdays, if you would like your ultrasound at [facility], please call [phone] to schedule.   Medical Concerns:  If you have urgent medical concerns please call [phone] at any time of the day.    Yennifer Cordova MD

## 2023-09-26 ENCOUNTER — PATIENT OUTREACH (OUTPATIENT)
Dept: NURSING | Facility: CLINIC | Age: 68
End: 2023-09-26
Payer: MEDICARE

## 2023-09-26 ENCOUNTER — TELEPHONE (OUTPATIENT)
Dept: INTERNAL MEDICINE | Facility: CLINIC | Age: 68
End: 2023-09-26

## 2023-09-26 ASSESSMENT — ACTIVITIES OF DAILY LIVING (ADL): DEPENDENT_IADLS:: SHOPPING;CLEANING;LAUNDRY;TRANSPORTATION

## 2023-09-26 NOTE — LETTER
[facility]  Patient Centered Plan of Care  About Me:        Patient Name:  Randi Cleary    YOB: 1955  Age:         68 year old   Richar MRN:    [id number] Telephone Information:  Home Phone [phone]   Mobile [phone]       Address:  [address] [address] Email address:  [email or web address]      Emergency Contact(s)    Name Relationship Lgl Grd Work Phone Home Phone Mobile Phone   1. JOANIE CLEARY Spouse No NONE [phone]   2. ANGELA CLEARY Sister-in-Law   [phone]           Primary language:  English     needed? No   [employer]:  [phone] op. 1  Other communication barriers:None    Preferred Method of Communication:     Current living arrangement: I live in a private home with spouse    Mobility Status/ Medical Equipment: Independent w/Device        Health Maintenance  Health Maintenance Reviewed: Due/Overdue   Health Maintenance Due   Topic Date Due    HF ACTION PLAN  Never done    DIABETIC FOOT EXAM  Never done    ADVANCE CARE PLANNING  Never done    COPD ACTION PLAN  Never done    ZOSTER IMMUNIZATION (1 of 2) Never done    HEPATITIS B IMMUNIZATION (1 of 3 - Risk 3-dose series) Never done    RSV VACCINE 60+ (1 - 1-dose 60+ series) Never done    MEDICARE ANNUAL WELLNESS VISIT  Never done    EYE EXAM  12/07/2021    INFLUENZA VACCINE (1) 09/01/2023    COVID-19 Vaccine (7 - 2023-24 season) 09/08/2023    A1C  10/14/2023          My Access Plan  Medical Emergency 911   Primary Clinic Line [facility] [phone]   24 Hour Appointment Line [phone] or  [phone]) (toll-free)   24 Hour Nurse Line [phone] (toll-free)   Preferred Urgent Care [facility] [phone]     [facility]  [phone]     Preferred Pharmacy [facility] [place] [address]  [address]     Behavioral Health Crisis Line The National Suicide Prevention Lifeline at 1-929.432.6910 or Text/Call 988             My Care Team Members  Patient Care Team         Relationship Specialty Notifications Start End    Laith Constantino MD PCP - General Internal Medicine Admissions 9/26/23     Phone: [phone] Fax: [phone]         [address] [facility] [address]    Eli Hilton, RN Specialty Care Coordinator Cardiology Admissions 8/13/19     Pulmonary HTN    Phone: [phone] Pager: [phone] Fax: [phone]       Bindu Walden, RN Specialty Care Coordinator Cardiology Admissions 8/26/19     Pulmonary HTN    Phone: [phone] Pager: [phone] Fax: [phone]       Alee Coker MD MD INTERNAL MEDICINE - ENDOCRINOLOGY, DIABETES & METABOLISM  9/26/19     Phone: [phone] Fax: [phone]         [address] [facility] [address] [facility] [address]    Francisco J Edwards MD MD Cardiology  3/17/20     Phone: [phone] Fax: [phone]         [address] [facility] [address] [place] [address]    B Mary Kay Weir, [facility] Therapist  - Clinical Admissions 6/22/20     Phone: [phone]          [address] [facility] [address]    Francisco J Edwards MD Assigned Heart and Vascular Provider   10/23/20     Phone: [phone] Fax: [phone]         [address] [address]    Liza Reynoso RN Specialty Care Coordinator Cardiology Admissions 4/5/22     Pulmonary HTN    Phone: [phone] Pager: [phone] Fax: [phone]       Reina Morillo MD Assigned Pulmonology Provider   7/9/22     Phone: [phone] Fax: [phone]         [facility] [address] [place] [address]    Waylon Catherine, PharmD Pharmacist Pharmacist  10/3/22     Phone: [phone] Fax: [phone]         [facility] [address] [place] [address]    Waylon Catherine, PharmD Assigned MTM Pharmacist    10/15/22     Phone: [phone] Fax: [phone]         [facility] [address] [place] [address]    Sowmya Hernandez, RN Specialty Care Coordinator Hematology Admissions 12/2/22     phone [phone]    Dusty Dubois MD Assigned Pain Medication Provider   1/9/23     Phone: [phone] Fax: [phone]         [address] [place] [address]    Johan England [facility] Pharmacist   3/1/23     Phone: [phone] Fax: [phone]         [facility] [place] [address]    Aparna Hutchinson MD MD Neurology  3/27/23     Phone: [phone] Fax: [phone]         [address] [place] [address]    Johan England [facility] Pharmacist Pharmacist  4/1/23     Phone: [phone] Fax: [phone]         [address] [place] [address]    Miki Richardson, RN Specialty Care Coordinator  Admissions 4/19/23     Phone: [phone] Pager: [phone]        Roland Das MD MD Psychiatry  7/27/23     Phone: [phone] Fax: [phone]         [facility] [address] [place] [address]    Tova Pablo MD Assigned Cancer Care Provider   8/5/23     Phone: [phone] Fax: [phone]         [address] [place] [address]    Mami Caballero MD Assigned Neuroscience Provider   9/16/23     Phone: [phone] Pager: [phone] Fax: [phone]        [address] [place] [address]    Chikis Randle MD Assigned PCP   9/30/23     Phone: [phone] Fax: [phone]         [address] [place] [address]              My Care Plans  Self Management and Treatment Plan  Care Plan  Care Plan: Mental Health       Problem: Mental Health Symptoms Need Improvement       Goal: I would like to feel as though my mental health has improved.       Start Date: 9/25/2023 Expected End Date: 9/24/2024    This Visit's Progress: 10%    Priority: High    Note:     Barriers: history of anxiety, bipolar  disorder, trauma related disorder  Strengths: strong advocate for herself  Patient expressed understanding of goal: yes  Action steps to achieve this goal:  1. I will continue to attend all appointments with my therapist Mary Kay Gomez and with my new psychiatric provide.   2. I will continue to attend all appointments with the [facility] partial-hospitalization program.   3. I will continue to the use the skills I have learned through therapy and the partial hospitalization program.   4. I will take my medications as directed.   5. I will report progress towards this goal at schedule outreach telephone calls from my Care Coordinator.   3. I will                                 Action Plans on File:                       Advance Care Plans/Directives Type:   No data recorded    My Medical and Care Information  Problem List   Patient Active Problem List   Diagnosis    DJD (degenerative joint disease), ankle and foot    Hereditary hemochromatosis (H24)    Pulmonary hypertension (H)    Postablative hypothyroidism    Class 2 obesity due to excess calories without serious comorbidity in adult    KYAW (obstructive sleep apnea)    Type 2 diabetes mellitus without complication, without long-term current use of insulin (H)    Hypokalemia    COPD (chronic obstructive pulmonary disease) (H)    Generalized anxiety disorder    Restless leg syndrome    Vitamin B12 deficiency    Vitamin D deficiency    Bipolar 2 disorder (H)    Morbid obesity (H)    History of cervical spinal surgery    Cervical stenosis of spinal canal    Alcohol use disorder, moderate, in sustained remission (H)    Essential hypertension    Psoriatic arthropathy (H)    Primary osteoarthritis involving multiple joints    Gastroesophageal reflux disease with esophagitis without hemorrhage    Numbness in both hands    Chronic, continuous use of opioids    Trauma and stressor-related disorder      Current Medications and Allergies:  See printed Medication Report.    Care  Coordination Start Date: 9/21/2023   Frequency of Care Coordination: 2 months     Form Last Updated: 10/12/2023

## 2023-09-26 NOTE — LETTER
[facility] CARE COORDINATION  [facility]    October 12, 2023    Randi J Cathy Cleary  [address] [address]      Dear Winnie,        I am a  clinic care coordinator who works with Laith Constantino MD with the [facility]. I wanted to thank you for spending the time to talk with me.  Below is a description of clinic care coordination and how I can further assist you.       The clinic care coordination team is made up of a registered nurse, , financial resource worker and community health worker who understand the health care system. The goal of clinic care coordination is to help you manage your health and improve access to the health care system. Our team works alongside your provider to assist you in determining your health and social needs. We can help you obtain health care and community resources, providing you with necessary information and education. We can work with you through any barriers and develop a care plan that helps coordinate and strengthen the communication between you and your care team.  Our services are voluntary and are offered without charge to you personally.    Please feel free to contact me with any questions or concerns regarding care coordination and what we can offer.      We are focused on providing you with the highest-quality healthcare experience possible.    Sincerely,     David Myhre, RN   [facility] RN  [phone]    Enclosed: I have enclosed a copy of the Patient Centered Plan of Care. This has helpful information and goals that we have talked about. Please keep this in an easy to access place to use as needed.

## 2023-09-26 NOTE — TELEPHONE ENCOUNTER
September 26, 2023    Physician statement for therapeutic shoes was received via fax for Dr. Constantino to sign.  Patient label was attached to paperwork and placed in provider's inbox to be signed.    Casi Vuong

## 2023-09-27 ENCOUNTER — OFFICE VISIT (OUTPATIENT)
Dept: PSYCHIATRY | Facility: CLINIC | Age: 68
End: 2023-09-27
Attending: PSYCHIATRY & NEUROLOGY
Payer: MEDICARE

## 2023-09-27 ENCOUNTER — TELEPHONE (OUTPATIENT)
Dept: BEHAVIORAL HEALTH | Facility: CLINIC | Age: 68
End: 2023-09-27

## 2023-09-27 ENCOUNTER — HOSPITAL ENCOUNTER (OUTPATIENT)
Dept: BEHAVIORAL HEALTH | Facility: CLINIC | Age: 68
Discharge: HOME | End: 2023-09-27
Attending: PSYCHIATRY & NEUROLOGY
Payer: MEDICARE

## 2023-09-27 ENCOUNTER — VIRTUAL VISIT (OUTPATIENT)
Dept: PSYCHOLOGY | Facility: CLINIC | Age: 68
End: 2023-09-27
Payer: MEDICARE

## 2023-09-27 VITALS
HEART RATE: 97 BPM | WEIGHT: 230.8 LBS | SYSTOLIC BLOOD PRESSURE: 114 MMHG | BODY MASS INDEX: 37.27 KG/M2 | DIASTOLIC BLOOD PRESSURE: 78 MMHG

## 2023-09-27 DIAGNOSIS — F31.81 BIPOLAR 2 DISORDER (H): Primary | ICD-10-CM

## 2023-09-27 DIAGNOSIS — F41.1 GENERALIZED ANXIETY DISORDER: ICD-10-CM

## 2023-09-27 DIAGNOSIS — F43.9 TRAUMA AND STRESSOR-RELATED DISORDER: ICD-10-CM

## 2023-09-27 PROCEDURE — 90853 GROUP PSYCHOTHERAPY: CPT | Performed by: SOCIAL WORKER

## 2023-09-27 PROCEDURE — 90834 PSYTX W PT 45 MINUTES: CPT | Mod: VID | Performed by: SOCIAL WORKER

## 2023-09-27 PROCEDURE — 90791 PSYCH DIAGNOSTIC EVALUATION: CPT | Mod: GC

## 2023-09-27 ASSESSMENT — PAIN SCALES - GENERAL: PAINLEVEL: EXTREME PAIN (8)

## 2023-09-27 ASSESSMENT — PATIENT HEALTH QUESTIONNAIRE - PHQ9
SUM OF ALL RESPONSES TO PHQ QUESTIONS 1-9: 17
10. IF YOU CHECKED OFF ANY PROBLEMS, HOW DIFFICULT HAVE THESE PROBLEMS MADE IT FOR YOU TO DO YOUR WORK, TAKE CARE OF THINGS AT HOME, OR GET ALONG WITH OTHER PEOPLE: EXTREMELY DIFFICULT
10. IF YOU CHECKED OFF ANY PROBLEMS, HOW DIFFICULT HAVE THESE PROBLEMS MADE IT FOR YOU TO DO YOUR WORK, TAKE CARE OF THINGS AT HOME, OR GET ALONG WITH OTHER PEOPLE: EXTREMELY DIFFICULT
SUM OF ALL RESPONSES TO PHQ QUESTIONS 1-9: 17

## 2023-09-27 NOTE — PROGRESS NOTES
[facility]                                     Progress Note    Patient Name: Randi Cleary  Date: 9/27/23         Service Type: Individual     Session Start Time: 4:08 PM Session End Time: 4:57 PM     Session Length: 49 minutes    Session #: 203    Attendees: Client attended alone    Service Modality:  Video Visit:      Provider verified identity through the following two step process.  Patient provided:  Patient is known previously to provider    Telemedicine Visit: The patient's condition can be safely assessed and treated via synchronous audio and visual telemedicine encounter.      Reason for Telemedicine Visit: Patient convenience (e.g. access to timely appointments / distance to available provider)    Originating Site (Patient Location): Patient's home    Distant Site (Provider Location): Provider Remote Setting- Home Office    Consent:  The patient/guardian has verbally consented to: the potential risks and benefits of telemedicine (video visit) versus in person care; bill my insurance or make self-payment for services provided; and responsibility for payment of non-covered services.     Patient would like the video invitation sent by:  My Chart    Mode of Communication:  [facility] Conference [facility]    Distant Location (Provider):  Off-site    As the provider I attest to compliance with applicable laws and regulations related to telemedicine.      DATA  Extended Session (53+ minutes): No  Interactive Complexity: No  Crisis: No        Progress Since Last Session (Related to Symptoms / Goals / Homework):   Symptoms:  Patient feels hopeful as they have some plans to move forward with various health concerns and has met with psychiatry for ongoing care.      Homework:  Progress made  Schedule through mychart -not done  Schedule shoulder surgery/see Vincent -not done  Follow up on foot insert- ask Lorena if Ced would fill out form for this -done  Find records from past psychiatrist  -not done  Contact couples counselor at [phone] -not done       Episode of Care Goals: Satisfactory progress - ACTION (Actively working towards change); Intervened by reinforcing change plan / affirming steps taken     Current / Ongoing Stressors and Concerns:   Patient is currently socially isolated. She has a conflictual relationship with her .  She is getting minimal physical activity.  She has had several surgeries. Patient's car was just insured.     Treatment Objective(s) Addressed in This Session:   Patient will increase frequency of engaging her in ADLs.  Patient will track and record at least 5 pleasant exchanges with . Patient will be able to identify at least 5 positive traits about her .  Patient will reduce level of depressive and anxious features as evidenced by reduction in score on her CHAVO-7 and PHQ-9 (scores of 15 and 16 at first measurment, respectively).     Intervention:   Supportive Therapy:   Reviewed medical concerns and progress she is making. Talked about connection with psychiatry and how it felt to be very open with provider there today. Discussed next steps. Explored emotions related to managing her complex medical needs. Looked at supports she has and next steps for her.      The following assessments were completed by patient for this visit:  PHQ9:       8/16/2023     9:45 AM 8/21/2023     8:11 AM 9/5/2023     1:27 PM 9/13/2023    10:57 AM 9/18/2023     9:51 AM 9/20/2023    10:10 AM 9/27/2023     4:45 AM   PHQ-9 SCORE   PHQ-9 Total Score MyChart 17 (Moderately severe depression) 13 (Moderate depression) 19 (Moderately severe depression) 17 (Moderately severe depression) 18 (Moderately severe depression) 18 (Moderately severe depression) 17 (Moderately severe depression)   PHQ-9 Total Score 17 13 19 17 18 18 17    17     GAD7:       6/22/2023     9:58 AM 7/6/2023     9:11 AM 7/21/2023    10:34 AM 8/7/2023     1:03 PM 8/21/2023    11:07 AM 9/5/2023     1:30 PM  9/20/2023     9:59 AM   CHAVO-7 SCORE   Total Score 17 (severe anxiety) 15 (severe anxiety) 18 (severe anxiety) 18 (severe anxiety) 13 (moderate anxiety) 15 (severe anxiety) 16 (severe anxiety)   Total Score 17    17 15    15 18 18    18 13 15 16    16    16     PROMIS 10-Global Health (only subscores and total score):       6/8/2023    12:05 PM 6/22/2023    10:03 AM 7/6/2023     9:12 AM 7/21/2023    10:36 AM 7/28/2023     3:24 PM 8/7/2023     1:07 PM 9/20/2023    10:13 AM   PROMIS-10 Scores Only   Global Mental Health Score 6    6 5    5 5    5 5    5  6    6     5    5   Global Physical Health Score 9    9 7    7 8    8 7    7  8    8     9    9   PROMIS TOTAL - SUBSCORES 15    15 12    12 13    13 12    12  14    14     14    14       Information is confidential and restricted. Go to Review Flowsheets to unlock data.    Multiple values from one day are sorted in reverse-chronological order        ASSESSMENT: Current Emotional / Mental Status (status of significant symptoms):   Risk status (Self / Other harm or suicidal ideation)   Patient denies current fears or concerns for personal safety.   Patient denies current or recent suicidal ideation or behaviors.   Patient denies current or recent homicidal ideation or behaviors.   Patient denies current or recent self injurious behavior or ideation.   Patient denies other safety concerns.   Patient reports there has been no change in risk factors since their last session.     Patient reports there has been no change in protective factors since their last session.     A safety and risk management plan has been developed including: Patient consented to co-developed safety plan on 11/24/2020, updated 2/20/23.  Safety and risk management plan was reviewed.   Patient agreed to use safety plan should any safety concerns arise.  A copy was made available to the patient.     Appearance:   Appropriate    Eye Contact:   Good    Psychomotor Behavior: Normal     Attitude:   Cooperative    Orientation:   All   Speech    Rate / Production: Normal/ Responsive    Volume:  Normal    Mood:    Anxious    Affect:    Appropriate    Thought Content:  Clear    Thought Form:  Coherent    Insight:    Good      Medication Review:   No changes to current psychiatric medication(s)     Medication Compliance:   Yes     Changes in Health Issues:   None reported     Chemical Use Review:   Substance Use: Chemical use reviewed, no active concerns identified Nothing used since 2021.     Tobacco Use: No current tobacco use.      Diagnosis:  1. Bipolar 2 disorder (H)    2. Generalized anxiety disorder    3. Trauma and stressor-related disorder        Collateral Reports Completed:   Not Applicable    PLAN: (Patient Tasks / Therapist Tasks / Other)  Schedule through [employer]  Schedule shoulder surgery/see Lervick  Schedule foot insert        There has been demonstrated improvement in functioning while patient has been engaged in psychotherapy/psychological service- if withdrawn the patient would deteriorate and/or relapse.     MICAELA SLADE, [facility]   2023                                                        ______________________________________________________________________    Individual Treatment Plan    Patient's Name: Randi Cleary  YOB: 1955    Date of Creation: 20  Date Treatment Plan Last Reviewed/Revised: 23    DSM5 Diagnoses: 296.89 Bipolar II Disorder Depressed, 300.02 (F41.1) Generalized Anxiety Disorder, or Adjustment Disorders  309.89 (F43.8) Other Specified Trauma and Stressor Related Disorder  Psychosocial / Contextual Factors: Patient's entire family of origin has , she now has a sister-in-law and  as support.  Relationship with  is conflictual. She is recovering from surgeries  PROMIS (reviewed every 90 days): PROMIS-10 Scores  PROMIS 10-Global Health (only subscores and total score):   PROMIS-10 Scores  "Only 12/14/2021 3/15/2022 3/15/2022 3/15/2022 3/24/2022 4/1/2022 6/9/2022   Global Mental Health Score 6 6 6 6 8 12 12   Global Physical Health Score 9 9 9 9 8 11 10   PROMIS TOTAL - SUBSCORES 15 15 15 15 16 23 22       Referral / Collaboration:  Referral to another professional/service is not indicated at this time..    Anticipated number of session for this episode of care: 50  Anticipation frequency of session: Biweekly  Anticipated Duration of each session: 53 or more minutes due to intensity of trauma symptoms  Treatment plan will be reviewed in 90 days or when goals have been changed.   There has been demonstrated improvement in functioning while patient has been engaged in psychotherapy/psychological service- if withdrawn the patient would deteriorate and/or relapse.       MeasurableTreatment Goal(s) related to diagnosis / functional impairment(s)  Goal 1: Patient will \"jumpstart, getting going with the things I need to be doing around the house as far as picking up, doing things, trying to do something every day.  Also to lessen the animosity between me and my .\"    I will know I've met my goal when my shoulders are fixed and I can see.      Objective #A (Patient Action)    Patient will  increase frequency of engaging her in ADLs .  Status: Continued - Date(s): 12/10/21, 3/9/22, 6/9/22, 9/2/22, 12/1/22, 3/2/23, 6/6/23, 9/13/23    Intervention(s)  Therapist will  engage patient in CBT, specifically behavioral activation .    Objective #B  Patient will   track and record at least 5 pleasant exchanges with . Patient will be able to identify at least 5 positive traits about her  and how he relates to her .  Status: Continued - Date(s): 12/10/21, 3/9/22, 6/9/22, 9/2/22, 12/1/22, 3/2/23, 6/6/23, 9/13/23    Intervention(s)  Therapist will  teach assertiveness skills and assign homework related to relationship interactions .    Objective #C  Patient will  reduce level of depressive and anxious " "features as evidenced by reduction in score on her CHAVO-7 and PHQ-9 (scores of 15 and 16 at first measurment, respectively) .  Status: Continued - Date(s): 12/10/21, 3/9/22, 6/9/22, 9/2/22, 12/1/22, 3/2/23, 6/6/23, 9/13/23    Intervention(s)  Therapist will  engage patient in person-centered therapy and CBT .    Patient has reviewed and agreed to the above plan.      MICAELA SLADE, [facility]  September 13, 2023                                                   Randi Cleary          SAFETY PLAN:  Step 1: Warning signs / cues (Thoughts, images, mood, situation, behavior) that a crisis may be developing:  Thoughts: \"I don't want to continue\" \"I am unwanted\"  Images: none  Thinking Processes: ruminating  Mood: anger  Behaviors: isolating/withdrawing , can't stop crying, not taking care of myself and not taking care of my responsibilities  Situations: small triggers, such as not being able to find something, or dropping something   Step 2: Coping strategies - Things I can do to take my mind off of my problems without contacting another person (relaxation technique, physical activity):  Distress Tolerance Strategies:  arts and crafts: drawing, play with my pet , listen to positive and upbeat music: any, change body temperature (ice pack/cold water)  and paced breathing/progressive muscle relaxation  Physical Activities: go for a walk, deep breathing and stretching   Focus on helpful thoughts:  \"You've been through this before, you can get through it again.\"  Step 3: People and social settings that provide distraction:                 Name: Carmen                            Name: Darien                           Name: Aleida       pool, shopping, Carmen's house, [employer]       Step 4: Remind myself of people and things that are important to me and worth living for:  Clifford Little Donna, post-COVID world, options of what could be in your future        Step 5: When I am in crisis, I can ask these people to help me use my " safety plan:                 Name: Sidney  Step 6: Making the environment safe:   go to sleep/daydream  Step 7: Professionals or agencies I can contact during a crisis:  [facility] Daytime Number: [phone]  Suicide Prevention Lifeline: 8-561-689-CPTW (8255)  Crisis Text Line Service (available 24 hours a day, 7 days a week): Text [place] to 643806  Local Crisis Services: [facility] Crisis: [phone]  Adults can always access to the emPATH unit at [facility] (no phone number, utilize it like an urgent care or ER where you just show up)     Call 911 or go to my nearest emergency department.       I helped develop this safety plan and agree to use it when needed.  I have been given a copy of this plan.       Client signature _________________________________________________________________  Today s date:  11/24/2020  Adapted from Safety Plan Template 2008 Randi Poole and Robby Barba is reprinted with the express permission of the authors.  No portion of the Safety Plan Template may be reproduced without the express, written permission.  You can contact the authors at [email or web address] or [email or web address].

## 2023-09-27 NOTE — TELEPHONE ENCOUNTER
September 27, 2023    Physician statement was picked up from outbox of Dr. Constantino.  Paperwork has been reviewed and is complete.  Per initial initial request, this was sent via fax to [phone].     Casi Vuong

## 2023-09-27 NOTE — DISCHARGE SUMMARY
"       Adult Mental Health Intensive Outpatient Discharge Summary/Instructions      Patient: Randi Cleary MRN: [id number]   : 1955 Age: 68 year old Sex: female     Admission Date: 23  Discharge Date: 23  Diagnosis: 296.33 (F33.2) Major Depressive Disorder, Recurrent Episode, Severe With anxious distress  300.02 (F41.1) Generalized Anxiety Disorder    Rule out:  296.89 Bipolar II Disorder vs. 314.01 (F90.2) Attention-Deficit/Hyperactivity Disorder   Combined presentation       Focus of Treatment / Progress    Personal Safety: Winnie denied suicidal ideation at discharge.     * Follow your safety plan     * Call crisis lines as needed:    [facility] [phone] [facility] [phone]  [facility] [phone] Crisis Connection [phone]  [facility] [phone] [facility]E [phone]  [facility] [phone] National Suicide Prevention [phone]  [facility] [phone] Suicide Prevention [phone]  [facility] [phone]    Managing symptoms of:  Depression, anxiety, hypomania and ADHD    Community support/health:  Winnie lives with her .  She connects with family.  She is working on managing pain and other health conditions.    Managing Symptoms and Preventing Relapse    * Go to all of your appointments    * Take all medications as directed.      * Carry a current list if medication with you    * Do not use illicit (street) drugs.  Avoid alcohol    * Report these symptoms to your care team. These are early signs of relapse:   Thoughts of suicide   Losing more sleep   Increased confusion   Mood getting worse   Feeling more aggressive   Other:  increased distress, difficulty leaving the home    *Use these skills daily:  Talk to someone you trust at least one time weekly, set boundaries and say \"no\", be assertive, act opposite of negative feelings, accept challenges with a positive attitude, exercise at least three times per week for 30 minutes, "  get enough sleep, eat healthy foods, get into a good routine     Copy of summary sent to: Available in EPIC    Follow up with psychiatrist / main caregiver: Massimo Silveira [facility]    Next visit: 9/27/23  intake    Follow up with your therapist: Dallas Weir, therapist- [facility]    Next visit: Weekly or As scheduled.    Go to group therapy and / or support groups at: Consider [facility] free support groups.  See www.namimn.org for listings.    See your medical doctor about:  General care needs and pain management.    Other:  Your treatment team appreciates having the opportunity to work with you and wishes you the best.     Client Signature:_______________________  Date / Time:___________    Staff Signature:________________________   Date / Time:___________

## 2023-09-28 ENCOUNTER — OFFICE VISIT (OUTPATIENT)
Dept: FAMILY MEDICINE | Facility: CLINIC | Age: 68
End: 2023-09-28
Payer: MEDICARE

## 2023-09-28 VITALS — WEIGHT: 231 LBS | BODY MASS INDEX: 37.3 KG/M2

## 2023-09-28 DIAGNOSIS — Z71.3 DIETARY COUNSELING AND SURVEILLANCE: Primary | ICD-10-CM

## 2023-09-28 NOTE — GROUP NOTE
Psychotherapy Group Note    PATIENT'S NAME: Randi Cleary  MRN:   [id number]  :   1955  ACCT. NUMBER: [id number]  DATE OF SERVICE: 23  START TIME:  2:00 PM  END TIME:  2:50 PM  FACILITATOR: Pamela Cabrera LICSW  TOPIC: MH EBP Group: Behavioral Activation  [facility] Day Treatment  TRACK: [facility]    NUMBER OF PARTICIPANTS: 4    Summary of Group / Topics Discussed:  Behavioral Activation: Activity Scheduling:Patients explored how they currently spend their time, and how specific behaviors impact thoughts and feelings.  The group explored the effect of negative and positive activities on mood states and thought patterns.  Patients identified activities that help to improve mood and thinking patterns, and developed a plan to implement positive activities between sessions.      Patient Session Goals / Objectives:  Identify impact of current behaviors on thoughts and mood  Identify 2-3 behavioral changes that could have a positive impact on thoughts and mood  Prepare to make desired behavioral change: Create a change plan / activity schedule  Discussed free and low cost activities and how to use one time and ongoing activities to help increase enjoyable activities.      Patient Participation / Response:  Fully participated with the group by sharing personal reflections / insights and openly received / provided feedback with other participants.    Shared experiences and challenges with making behavioral changes    Treatment Plan:  Patient has See Epic Treatment Plan - Patient is discharging.    ASHKAN Hector

## 2023-09-28 NOTE — PROGRESS NOTES
PATIENT HISTORY:   Ms. Randi Cleary returns for her nutrition visit to the lifestyle medicine weight management program. Previous measurements have been as follows:    Wt Readings from Last 5 Encounters:   09/28/23 104.8 kg (231 lb)   09/25/23 103.9 kg (229 lb)   09/25/23 104 kg (229 lb 4.8 oz)   08/31/23 103.9 kg (229 lb)   08/21/23 103.4 kg (228 lb)       Initial goal is to achieve a 5% weight loss of 11-12 lbs (i.e. A body weight of 216-217 lbs) within the next 3-4 months.       Since her last visit, she reports being very busy with a lot of medical appointments since our last visit. Since then has been diagnosed with gout, and feels her eating exacerbated these symptoms.     Has really been trying to eat at regular intervals. Still struggles with this a bit.     Will be having surgery on her foot on the 23rd.     Social: .  is  and may work very long 12 hour days.      DIETARY HISTORY:   Values home cooking and comfort foods. Used to like to cook, but now limited with neck pain and decreased strength. Has re-organized kitchen to include things that are easily lifted.      Eats very little meat  Uses plain yogurt with cinnamon (c-norbert) sprinkled on, with blueberries  Cottage cheese     Wake at 7-8 am  Breakfast: scrambled eggs with english muffin, pc cheese, pc ham. Woo tea smoothie  Skip breakfast more then eat breakfast.   Fruit mid morning - banana or blueberries, maybe bread, cheese  Lunch 1-2 pm: tuna sandwiches, sardines with lettuce, bags of salad  Dinner: taco salad with taco mix, microwave dinner from deli  Dessert: ice cream but not a lot. Fruit and yogurt.     PHYSICAL ACTIVITY HISTORY: Now, activity is very limited due to RIGHT ankle injuries, low back pain.   Her activity is going up and down stairs in her house to do laundry or getting out of the house to go to appointments. Also, feeds the cat. Has trouble lifting because of her shoulders.   Has a stationary  "bicycle at home.        OBJECTIVE:   Estimated body mass index is 37.3 kg/m  as calculated from the following:    Height as of 8/15/23: 1.676 m (5' 5.98\").    Weight as of this encounter: 104.8 kg (231 lb).      IMPRESSION:   Winnie is a 68 year old with obesity who is hoping to lose weight to improve her health  Recently found to have high uric acid, hoping some changes in her diet may alleviate some pain     DIETARY ASSESSMENT/PLAN:   Will plan to limit high purine foods going forward  Continue to prioritize eating at regular intervals   Winnie would also like to avoid eating bread  For proteins, consider where to incorporate eggs, yogurt, chicken or ground turkey  Frozen fruits work great when fresh are not the best quality    Miscellaneous Issues:   Use stationary bike when able - movement can be a great way to see progress in health (stamina, balance, energy, sleep)    Follow-up:   Tuesday October 17 at 2:00 pm in clinic    Patient referred by Laith Constantino MD   Total time spent with patient 40 minutes    Ekaterina Horton RD    "

## 2023-09-28 NOTE — PATIENT INSTRUCTIONS
DIETARY ASSESSMENT/PLAN:   Will plan to limit high purine foods going forward  Continue to prioritize eating at regular intervals   Winnie would also like to avoid eating bread  For proteins, consider where to incorporate eggs, yogurt, chicken or ground turkey  Frozen fruits work great when fresh are not the best quality    Miscellaneous Issues:   Use stationary bike when able - movement can be a great way to see progress in health (stamina, balance, energy, sleep)    Follow-up:   Tuesday October 17 at 2:00 pm in clinic    Ekaterina Horton RD

## 2023-09-28 NOTE — GROUP NOTE
Process Group Note    PATIENT'S NAME: Randi Cleary  MRN:   [id number]  :   1955  ACCT. NUMBER: [id number]  DATE OF SERVICE: 23  START TIME:  1:00 PM  END TIME:  1:50 PM  FACILITATOR: Pamela Cabrera [facility]  TOPIC:  Process Group    Diagnoses:  296.33 (F33.2) Major Depressive Disorder, Recurrent Episode, Severe With anxious distress  300.02 (F41.1) Generalized Anxiety Disorder    Rule out:  296.89 Bipolar II Disorder vs. 314.01 (F90.2) Attention-Deficit/Hyperactivity Disorder   Combined presentation       [facility]  TRACK: [facility]    NUMBER OF PARTICIPANTS: 4        Data:    Session content: At the start of this group, patients were invited to check in by identifying themselves, describing their current emotional status, and identifying issues to address in this group.   Area(s) of treatment focus addressed in this session included Symptom Management, Personal Safety, and Community Resources/Discharge Planning.  Winnie reported feeling tired.  She attended an intake interview with the psychiatry program.  She shared that she will not work with them for medications, but she may get a therapy group for women.  She reported that she found the interview tiring.  She will use the skills of survival skills to cope.  She reported goal to spend time with her spouse.  Client denied suicidal ideation, intent and plan.      She reported finding the group to be helpful.   She was sad that the new provider was not a medicare provider so she was unable to attend.   The Care Coordinator provided an initial list of resources in the community.      Therapeutic Interventions/Treatment Strategies:  Psychotherapist offered support, feedback and validation and reinforced use of skills. Treatment modalities used include Cognitive Behavioral Therapy. Interventions include Coping Skills: Assisted patient in understanding the purpose of planning / creating / participating /  sharing in positive experiences.    Assessment:    Patient response:   Patient responded to session by focusing on goals and being attentive    Possible barriers to participation / learning include: and no barriers identified    Health Issues:   None reported       Substance Use Review:   Substance Use: No active concerns identified.    Mental Status/Behavioral Observations  Appearance:   Appropriate   Eye Contact:   Good   Psychomotor Behavior: Normal   Attitude:   Cooperative   Orientation:   All  Speech   Rate / Production: Normal    Volume:  Normal   Mood:    Anxious  Depressed   Affect:    Appropriate   Thought Content:   Clear  Thought Form:  Coherent  Logical     Insight:    Good     Plan:   Safety Plan: No current safety concerns identified.  Recommended that patient call 911 or go to the local ED should there be a change in any of these risk factors.   Barriers to treatment: None identified  Patient Contracts (see media tab):  None  Substance Use: Not addressed in session   Continue or Discharge: Patient is being discharged today. See Treatment Plan and Discharge Summary.       Pamela Cabrera, [facility]  September 28, 2023

## 2023-10-02 ENCOUNTER — VIRTUAL VISIT (OUTPATIENT)
Dept: PSYCHOLOGY | Facility: CLINIC | Age: 68
End: 2023-10-02
Payer: MEDICARE

## 2023-10-02 DIAGNOSIS — F31.81 BIPOLAR 2 DISORDER (H): Primary | ICD-10-CM

## 2023-10-02 DIAGNOSIS — F43.9 TRAUMA AND STRESSOR-RELATED DISORDER: ICD-10-CM

## 2023-10-02 DIAGNOSIS — F41.1 GENERALIZED ANXIETY DISORDER: ICD-10-CM

## 2023-10-02 PROCEDURE — 90837 PSYTX W PT 60 MINUTES: CPT | Mod: VID | Performed by: SOCIAL WORKER

## 2023-10-02 NOTE — PROGRESS NOTES
[facility]                                     Progress Note    Patient Name: Randi Cleary  Date: 10/2/23         Service Type: Individual     Session Start Time: 10:02 AM Session End Time: 10:56 AM     Session Length: 49 minutes    Session #: 204    Attendees: Client attended alone    Service Modality:  Video Visit:      Provider verified identity through the following two step process.  Patient provided:  Patient is known previously to provider    Telemedicine Visit: The patient's condition can be safely assessed and treated via synchronous audio and visual telemedicine encounter.      Reason for Telemedicine Visit: Patient convenience (e.g. access to timely appointments / distance to available provider)    Originating Site (Patient Location): Patient's home    Distant Site (Provider Location): Provider Remote Setting- Home Office    Consent:  The patient/guardian has verbally consented to: the potential risks and benefits of telemedicine (video visit) versus in person care; bill my insurance or make self-payment for services provided; and responsibility for payment of non-covered services.     Patient would like the video invitation sent by:  My Chart    Mode of Communication:  Video Conference via [facility]    Distant Location (Provider):  Off-site    As the provider I attest to compliance with applicable laws and regulations related to telemedicine.      DATA  Extended Session (53+ minutes): No  Interactive Complexity: No  Crisis: No        Progress Since Last Session (Related to Symptoms / Goals / Homework):   Symptoms:  Patient reports feeling really overwhelmed and with lower hope than the previous week.       Homework:  Progress made  Schedule through numberFire  Schedule shoulder surgery/see Vincent  Schedule foot insert       Episode of Care Goals: Satisfactory progress - ACTION (Actively working towards change); Intervened by reinforcing change plan / affirming steps  taken     Current / Ongoing Stressors and Concerns:   Patient is currently socially isolated. She has a conflictual relationship with her .  She is getting minimal physical activity.  She has had several surgeries. Patient's car was just insured.     Treatment Objective(s) Addressed in This Session:   Patient will increase frequency of engaging her in ADLs.  Patient will track and record at least 5 pleasant exchanges with . Patient will be able to identify at least 5 positive traits about her .  Patient will reduce level of depressive and anxious features as evidenced by reduction in score on her CHAVO-7 and PHQ-9 (scores of 15 and 16 at first measurment, respectively).     Intervention:   Supportive Therapy:   Looked at frustration in friendship, looked at what came up in her during this and how to manage her moods. Discussed health care and reviewed upcoming appointments causing her stress; helped slow patient down and make sense of what was coming up with her appointments.     The following assessments were completed by patient for this visit:  PHQ9:       8/16/2023     9:45 AM 8/21/2023     8:11 AM 9/5/2023     1:27 PM 9/13/2023    10:57 AM 9/18/2023     9:51 AM 9/20/2023    10:10 AM 9/27/2023     4:45 AM   PHQ-9 SCORE   PHQ-9 Total Score MyChart 17 (Moderately severe depression) 13 (Moderate depression) 19 (Moderately severe depression) 17 (Moderately severe depression) 18 (Moderately severe depression) 18 (Moderately severe depression) 17 (Moderately severe depression)   PHQ-9 Total Score 17 13 19 17 18 18 17    17     GAD7:       6/22/2023     9:58 AM 7/6/2023     9:11 AM 7/21/2023    10:34 AM 8/7/2023     1:03 PM 8/21/2023    11:07 AM 9/5/2023     1:30 PM 9/20/2023     9:59 AM   CHAVO-7 SCORE   Total Score 17 (severe anxiety) 15 (severe anxiety) 18 (severe anxiety) 18 (severe anxiety) 13 (moderate anxiety) 15 (severe anxiety) 16 (severe anxiety)   Total Score 17    17 15    15 18 18    18 13  15 16    16    16     PROMIS 10-Global Health (only subscores and total score):       6/8/2023    12:05 PM 6/22/2023    10:03 AM 7/6/2023     9:12 AM 7/21/2023    10:36 AM 7/28/2023     3:24 PM 8/7/2023     1:07 PM 9/20/2023    10:13 AM   PROMIS-10 Scores Only   Global Mental Health Score 6    6 5    5 5    5 5    5  6    6     5    5   Global Physical Health Score 9    9 7    7 8    8 7    7  8    8     9    9   PROMIS TOTAL - SUBSCORES 15    15 12    12 13    13 12    12  14    14     14    14       Information is confidential and restricted. Go to Review Flowsheets to unlock data.    Multiple values from one day are sorted in reverse-chronological order        ASSESSMENT: Current Emotional / Mental Status (status of significant symptoms):   Risk status (Self / Other harm or suicidal ideation)   Patient denies current fears or concerns for personal safety.   Patient denies current or recent suicidal ideation or behaviors.   Patient denies current or recent homicidal ideation or behaviors.   Patient denies current or recent self injurious behavior or ideation.   Patient denies other safety concerns.   Patient reports there has been no change in risk factors since their last session.     Patient reports there has been no change in protective factors since their last session.     A safety and risk management plan has been developed including: Patient consented to co-developed safety plan on 11/24/2020, updated 2/20/23.  Safety and risk management plan was reviewed.   Patient agreed to use safety plan should any safety concerns arise.  A copy was made available to the patient.     Appearance:   Appropriate    Eye Contact:   Good    Psychomotor Behavior: Normal    Attitude:   Cooperative    Orientation:   All   Speech    Rate / Production: Normal/ Responsive    Volume:  Normal    Mood:    Anxious  stressed   Affect:    Appropriate  Tearful   Thought Content:  Clear    Thought Form:  Coherent    Insight:    Good       Medication Review:   No changes to current psychiatric medication(s)     Medication Compliance:   Yes     Changes in Health Issues:   None reported     Chemical Use Review:   Substance Use: Chemical use reviewed, no active concerns identified Nothing used since 2021.     Tobacco Use: No current tobacco use.      Diagnosis:  1. Bipolar 2 disorder (H)    2. Generalized anxiety disorder    3. Trauma and stressor-related disorder          Collateral Reports Completed:   Not Applicable    PLAN: (Patient Tasks / Therapist Tasks / Other)  Schedule through mychart  Schedule shoulder surgery/see [place]  Schedule foot insert        There has been demonstrated improvement in functioning while patient has been engaged in psychotherapy/psychological service- if withdrawn the patient would deteriorate and/or relapse.     MICAELA SLADE, [facility]   2023                                                        ______________________________________________________________________    Individual Treatment Plan    Patient's Name: Randi Cleary  YOB: 1955    Date of Creation: 20  Date Treatment Plan Last Reviewed/Revised: 23    DSM5 Diagnoses: 296.89 Bipolar II Disorder Depressed, 300.02 (F41.1) Generalized Anxiety Disorder, or Adjustment Disorders  309.89 (F43.8) Other Specified Trauma and Stressor Related Disorder  Psychosocial / Contextual Factors: Patient's entire family of origin has , she now has a sister-in-law and  as support.  Relationship with  is conflictual. She is recovering from surgeries  PROMIS (reviewed every 90 days): PROMIS-10 Scores  PROMIS 10-Global Health (only subscores and total score):   PROMIS-10 Scores Only 2021 3/15/2022 3/15/2022 3/15/2022 3/24/2022 2022 2022   Global Mental Health Score 6 6 6 6 8 12 12   Global Physical Health Score 9 9 9 9 8 11 10   PROMIS TOTAL - SUBSCORES 15 15 15 15 16 23 22       Referral /  "Collaboration:  Referral to another professional/service is not indicated at this time..    Anticipated number of session for this episode of care: 50  Anticipation frequency of session: Biweekly  Anticipated Duration of each session: 53 or more minutes due to intensity of trauma symptoms  Treatment plan will be reviewed in 90 days or when goals have been changed.   There has been demonstrated improvement in functioning while patient has been engaged in psychotherapy/psychological service- if withdrawn the patient would deteriorate and/or relapse.       MeasurableTreatment Goal(s) related to diagnosis / functional impairment(s)  Goal 1: Patient will \"jumpstart, getting going with the things I need to be doing around the house as far as picking up, doing things, trying to do something every day.  Also to lessen the animosity between me and my .\"    I will know I've met my goal when my shoulders are fixed and I can see.      Objective #A (Patient Action)    Patient will  increase frequency of engaging her in ADLs .  Status: Continued - Date(s): 12/10/21, 3/9/22, 6/9/22, 9/2/22, 12/1/22, 3/2/23, 6/6/23, 9/13/23    Intervention(s)  Therapist will  engage patient in CBT, specifically behavioral activation .    Objective #B  Patient will   track and record at least 5 pleasant exchanges with . Patient will be able to identify at least 5 positive traits about her  and how he relates to her .  Status: Continued - Date(s): 12/10/21, 3/9/22, 6/9/22, 9/2/22, 12/1/22, 3/2/23, 6/6/23, 9/13/23    Intervention(s)  Therapist will  teach assertiveness skills and assign homework related to relationship interactions .    Objective #C  Patient will  reduce level of depressive and anxious features as evidenced by reduction in score on her CHAVO-7 and PHQ-9 (scores of 15 and 16 at first measurment, respectively) .  Status: Continued - Date(s): 12/10/21, 3/9/22, 6/9/22, 9/2/22, 12/1/22, 3/2/23, 6/6/23, " "9/13/23    Intervention(s)  Therapist will  engage patient in person-centered therapy and CBT .    Patient has reviewed and agreed to the above plan.      MICAELA SLADE, [facility]  September 13, 2023                                                   Randi Cleary          SAFETY PLAN:  Step 1: Warning signs / cues (Thoughts, images, mood, situation, behavior) that a crisis may be developing:  Thoughts: \"I don't want to continue\" \"I am unwanted\"  Images: none  Thinking Processes: ruminating  Mood: anger  Behaviors: isolating/withdrawing , can't stop crying, not taking care of myself and not taking care of my responsibilities  Situations: small triggers, such as not being able to find something, or dropping something   Step 2: Coping strategies - Things I can do to take my mind off of my problems without contacting another person (relaxation technique, physical activity):  Distress Tolerance Strategies:  arts and crafts: drawing, play with my pet , listen to positive and upbeat music: any, change body temperature (ice pack/cold water)  and paced breathing/progressive muscle relaxation  Physical Activities: go for a walk, deep breathing and stretching   Focus on helpful thoughts:  \"You've been through this before, you can get through it again.\"  Step 3: People and social settings that provide distraction:                 Name: Carmen                            Name: Darien                           Name: Aleida       pool, shopping, Carmen's house, [employer]       Step 4: Remind myself of people and things that are important to me and worth living for:  Sidney, Clifford, Aleida, post-COVID world, options of what could be in your future        Step 5: When I am in crisis, I can ask these people to help me use my safety plan:                 Name: Sidney  Step 6: Making the environment safe:   go to sleep/daydream  Step 7: Professionals or agencies I can contact during a crisis:  [facility] Daytime Number: " [phone]  Suicide Prevention Lifeline: 7-231-771-TALK (9243)  Crisis Text Line Service (available 24 hours a day, 7 days a week): Text [place] to 391511  Local Crisis Services: [facility] Crisis: [phone]  Adults can always access to the emPATH unit at [facility] (no phone number, utilize it like an urgent care or ER where you just show up)     Call 911 or go to my nearest emergency department.       I helped develop this safety plan and agree to use it when needed.  I have been given a copy of this plan.       Client signature _________________________________________________________________  Today s date:  11/24/2020  Adapted from Safety Plan Template 2008 Randi Poole and Robby Barba is reprinted with the express permission of the authors.  No portion of the Safety Plan Template may be reproduced without the express, written permission.  You can contact the authors at [email or web address] or [email or web address].

## 2023-10-03 ENCOUNTER — ALLIED HEALTH/NURSE VISIT (OUTPATIENT)
Dept: NURSING | Facility: CLINIC | Age: 68
End: 2023-10-03
Payer: MEDICARE

## 2023-10-03 ENCOUNTER — TELEPHONE (OUTPATIENT)
Dept: INTERNAL MEDICINE | Facility: CLINIC | Age: 68
End: 2023-10-03

## 2023-10-03 DIAGNOSIS — M10.9 URIC ACID ARTHROPATHY: Primary | ICD-10-CM

## 2023-10-03 DIAGNOSIS — Z71.89 COUNSELING AND COORDINATION OF CARE: Primary | ICD-10-CM

## 2023-10-03 PROCEDURE — 99207 PR NO CHARGE NURSE ONLY: CPT

## 2023-10-03 RX ORDER — PREDNISONE 10 MG/1
10 TABLET ORAL EVERY MORNING
Qty: 10 TABLET | Refills: 0 | Status: SHIPPED | OUTPATIENT
Start: 2023-10-03 | End: 2023-10-11

## 2023-10-03 NOTE — TELEPHONE ENCOUNTER
October 3, 2023    Home health orders was received via fax for Dr. Constantino to sign.  Patient label was attached to paperwork and placed in provider's inbox to be signed.    Casi Vuong

## 2023-10-04 DIAGNOSIS — G62.9 NEUROPATHY: ICD-10-CM

## 2023-10-04 DIAGNOSIS — L40.50 PSORIATIC ARTHROPATHY (H): ICD-10-CM

## 2023-10-04 DIAGNOSIS — M79.671 RIGHT FOOT PAIN: ICD-10-CM

## 2023-10-04 DIAGNOSIS — L84 PLANTAR CALLUS: ICD-10-CM

## 2023-10-04 DIAGNOSIS — E11.9 TYPE 2 DIABETES MELLITUS WITHOUT COMPLICATION, WITHOUT LONG-TERM CURRENT USE OF INSULIN (H): Primary | ICD-10-CM

## 2023-10-04 NOTE — TELEPHONE ENCOUNTER
October 4, 2023    Home health orders was picked up from outbox of Dr. Constantino.  Paperwork has been reviewed and is complete.  Paperwork given to [facility] RN, Christopher, as requested.    Casi Vuong

## 2023-10-05 ENCOUNTER — ANCILLARY PROCEDURE (OUTPATIENT)
Dept: CARDIOLOGY | Facility: CLINIC | Age: 68
End: 2023-10-05
Attending: INTERNAL MEDICINE
Payer: MEDICARE

## 2023-10-05 DIAGNOSIS — I27.20 PULMONARY HTN (H): ICD-10-CM

## 2023-10-05 DIAGNOSIS — R06.02 SHORTNESS OF BREATH: ICD-10-CM

## 2023-10-05 LAB — LVEF ECHO: NORMAL

## 2023-10-05 PROCEDURE — 93306 TTE W/DOPPLER COMPLETE: CPT | Mod: GC | Performed by: INTERNAL MEDICINE

## 2023-10-09 ENCOUNTER — VIRTUAL VISIT (OUTPATIENT)
Dept: PSYCHOLOGY | Facility: CLINIC | Age: 68
End: 2023-10-09
Payer: MEDICARE

## 2023-10-09 DIAGNOSIS — F41.1 GENERALIZED ANXIETY DISORDER: ICD-10-CM

## 2023-10-09 DIAGNOSIS — F43.9 TRAUMA AND STRESSOR-RELATED DISORDER: ICD-10-CM

## 2023-10-09 DIAGNOSIS — F31.81 BIPOLAR 2 DISORDER (H): Primary | ICD-10-CM

## 2023-10-09 PROCEDURE — 90837 PSYTX W PT 60 MINUTES: CPT | Mod: VID | Performed by: SOCIAL WORKER

## 2023-10-09 ASSESSMENT — ANXIETY QUESTIONNAIRES
3. WORRYING TOO MUCH ABOUT DIFFERENT THINGS: SEVERAL DAYS
GAD7 TOTAL SCORE: 15
6. BECOMING EASILY ANNOYED OR IRRITABLE: NEARLY EVERY DAY
2. NOT BEING ABLE TO STOP OR CONTROL WORRYING: MORE THAN HALF THE DAYS
IF YOU CHECKED OFF ANY PROBLEMS ON THIS QUESTIONNAIRE, HOW DIFFICULT HAVE THESE PROBLEMS MADE IT FOR YOU TO DO YOUR WORK, TAKE CARE OF THINGS AT HOME, OR GET ALONG WITH OTHER PEOPLE: VERY DIFFICULT
7. FEELING AFRAID AS IF SOMETHING AWFUL MIGHT HAPPEN: SEVERAL DAYS
5. BEING SO RESTLESS THAT IT IS HARD TO SIT STILL: MORE THAN HALF THE DAYS
GAD7 TOTAL SCORE: 15
4. TROUBLE RELAXING: NEARLY EVERY DAY
1. FEELING NERVOUS, ANXIOUS, OR ON EDGE: NEARLY EVERY DAY

## 2023-10-09 NOTE — PROGRESS NOTES
[facility]                                     Progress Note    Patient Name: Randi Cleary  Date: 10/9/23         Service Type: Individual     Session Start Time: 10:06 AM Session End Time: 11:05 AM     Session Length: 61 minutes    Session #: 205    Attendees: Client attended alone    Service Modality:  Video Visit:      Provider verified identity through the following two step process.  Patient provided:  Patient is known previously to provider    Telemedicine Visit: The patient's condition can be safely assessed and treated via synchronous audio and visual telemedicine encounter.      Reason for Telemedicine Visit: Patient convenience (e.g. access to timely appointments / distance to available provider)    Originating Site (Patient Location): Patient's home    Distant Site (Provider Location): Provider Remote Setting- Home Office    Consent:  The patient/guardian has verbally consented to: the potential risks and benefits of telemedicine (video visit) versus in person care; bill my insurance or make self-payment for services provided; and responsibility for payment of non-covered services.     Patient would like the video invitation sent by:  My Chart    Mode of Communication:  Video Conference via [facility]    Distant Location (Provider):  Off-site    As the provider I attest to compliance with applicable laws and regulations related to telemedicine.      DATA  Extended Session (53+ minutes): No  Interactive Complexity: No  Crisis: No        Progress Since Last Session (Related to Symptoms / Goals / Homework):   Symptoms:  Patient reports maintaining mood.       Homework:  Progress made  Schedule through mychart -done  Schedule shoulder surgery/see Lervick  Schedule foot insert       Episode of Care Goals: Satisfactory progress - ACTION (Actively working towards change); Intervened by reinforcing change plan / affirming steps taken     Current / Ongoing Stressors and Concerns:   Patient  is currently socially isolated. She has a conflictual relationship with her .  She is getting minimal physical activity.  She has had several surgeries. Patient's car was just insured.     Treatment Objective(s) Addressed in This Session:   Patient will increase frequency of engaging her in ADLs.  Patient will track and record at least 5 pleasant exchanges with . Patient will be able to identify at least 5 positive traits about her .  Patient will reduce level of depressive and anxious features as evidenced by reduction in score on her CHAVO-7 and PHQ-9 (scores of 15 and 16 at first measurment, respectively).     Intervention:   Supportive Therapy:   Reviewed past history of ankle concerns and her progress over time with surgeries. Discussed upcoming surgery. Talked about upcoming appointments focused on her mental health. Looked at friendships and relationships and supports she gets in these. Talked about marriage and what she might need from this.    The following assessments were completed by patient for this visit:  PHQ9:       8/16/2023     9:45 AM 8/21/2023     8:11 AM 9/5/2023     1:27 PM 9/13/2023    10:57 AM 9/18/2023     9:51 AM 9/20/2023    10:10 AM 9/27/2023     4:45 AM   PHQ-9 SCORE   PHQ-9 Total Score MyChart 17 (Moderately severe depression) 13 (Moderate depression) 19 (Moderately severe depression) 17 (Moderately severe depression) 18 (Moderately severe depression) 18 (Moderately severe depression) 17 (Moderately severe depression)   PHQ-9 Total Score 17 13 19 17 18 18 17    17     GAD7:       6/22/2023     9:58 AM 7/6/2023     9:11 AM 7/21/2023    10:34 AM 8/7/2023     1:03 PM 8/21/2023    11:07 AM 9/5/2023     1:30 PM 9/20/2023     9:59 AM   CHAVO-7 SCORE   Total Score 17 (severe anxiety) 15 (severe anxiety) 18 (severe anxiety) 18 (severe anxiety) 13 (moderate anxiety) 15 (severe anxiety) 16 (severe anxiety)   Total Score 17    17 15    15 18 18    18 13 15 16    16    16      PROMIS 10-Global Health (only subscores and total score):       6/8/2023    12:05 PM 6/22/2023    10:03 AM 7/6/2023     9:12 AM 7/21/2023    10:36 AM 7/28/2023     3:24 PM 8/7/2023     1:07 PM 9/20/2023    10:13 AM   PROMIS-10 Scores Only   Global Mental Health Score 6    6 5    5 5    5 5    5  6    6     5    5   Global Physical Health Score 9    9 7    7 8    8 7    7  8    8     9    9   PROMIS TOTAL - SUBSCORES 15    15 12    12 13    13 12    12  14    14     14    14       Information is confidential and restricted. Go to Review Flowsheets to unlock data.    Multiple values from one day are sorted in reverse-chronological order        ASSESSMENT: Current Emotional / Mental Status (status of significant symptoms):   Risk status (Self / Other harm or suicidal ideation)   Patient denies current fears or concerns for personal safety.   Patient denies current or recent suicidal ideation or behaviors.   Patient denies current or recent homicidal ideation or behaviors.   Patient denies current or recent self injurious behavior or ideation.   Patient denies other safety concerns.   Patient reports there has been no change in risk factors since their last session.     Patient reports there has been no change in protective factors since their last session.     A safety and risk management plan has been developed including: Patient consented to co-developed safety plan on 11/24/2020, updated 2/20/23.  Safety and risk management plan was reviewed.   Patient agreed to use safety plan should any safety concerns arise.  A copy was made available to the patient.     Appearance:   Appropriate    Eye Contact:   Good    Psychomotor Behavior: Normal    Attitude:   Cooperative    Orientation:   All   Speech    Rate / Production: Normal/ Responsive    Volume:  Normal    Mood:    Anxious    Affect:    Appropriate  Tearful   Thought Content:  Clear    Thought Form:  Coherent    Insight:    Good      Medication Review:   No  changes to current psychiatric medication(s)     Medication Compliance:   Yes     Changes in Health Issues:   None reported     Chemical Use Review:   Substance Use: Chemical use reviewed, no active concerns identified Nothing used since 2021.     Tobacco Use: No current tobacco use.      Diagnosis:  1. Bipolar 2 disorder (H)    2. Generalized anxiety disorder    3. Trauma and stressor-related disorder          Collateral Reports Completed:   Not Applicable    PLAN: (Patient Tasks / Therapist Tasks / Other)  Schedule shoulder surgery/see Lervick  Schedule foot insert        There has been demonstrated improvement in functioning while patient has been engaged in psychotherapy/psychological service- if withdrawn the patient would deteriorate and/or relapse.     MICAELA SLADE, [facility]   2023                                                        ______________________________________________________________________    Individual Treatment Plan    Patient's Name: Randi Cleary  YOB: 1955    Date of Creation: 20  Date Treatment Plan Last Reviewed/Revised: 23    DSM5 Diagnoses: 296.89 Bipolar II Disorder Depressed, 300.02 (F41.1) Generalized Anxiety Disorder, or Adjustment Disorders  309.89 (F43.8) Other Specified Trauma and Stressor Related Disorder  Psychosocial / Contextual Factors: Patient's entire family of origin has , she now has a sister-in-law and  as support.  Relationship with  is conflictual. She is recovering from surgeries  PROMIS (reviewed every 90 days): PROMIS-10 Scores  PROMIS 10-Global Health (only subscores and total score):   PROMIS-10 Scores Only 2021 3/15/2022 3/15/2022 3/15/2022 3/24/2022 2022 2022   Global Mental Health Score 6 6 6 6 8 12 12   Global Physical Health Score 9 9 9 9 8 11 10   PROMIS TOTAL - SUBSCORES 15 15 15 15 16 23 22       Referral / Collaboration:  Referral to another professional/service is not  "indicated at this time..    Anticipated number of session for this episode of care: 50  Anticipation frequency of session: Biweekly  Anticipated Duration of each session: 53 or more minutes due to intensity of trauma symptoms  Treatment plan will be reviewed in 90 days or when goals have been changed.   There has been demonstrated improvement in functioning while patient has been engaged in psychotherapy/psychological service- if withdrawn the patient would deteriorate and/or relapse.       MeasurableTreatment Goal(s) related to diagnosis / functional impairment(s)  Goal 1: Patient will \"jumpstart, getting going with the things I need to be doing around the house as far as picking up, doing things, trying to do something every day.  Also to lessen the animosity between me and my .\"    I will know I've met my goal when my shoulders are fixed and I can see.      Objective #A (Patient Action)    Patient will  increase frequency of engaging her in ADLs .  Status: Continued - Date(s): 12/10/21, 3/9/22, 6/9/22, 9/2/22, 12/1/22, 3/2/23, 6/6/23, 9/13/23    Intervention(s)  Therapist will  engage patient in CBT, specifically behavioral activation .    Objective #B  Patient will   track and record at least 5 pleasant exchanges with . Patient will be able to identify at least 5 positive traits about her  and how he relates to her .  Status: Continued - Date(s): 12/10/21, 3/9/22, 6/9/22, 9/2/22, 12/1/22, 3/2/23, 6/6/23, 9/13/23    Intervention(s)  Therapist will  teach assertiveness skills and assign homework related to relationship interactions .    Objective #C  Patient will  reduce level of depressive and anxious features as evidenced by reduction in score on her CHAVO-7 and PHQ-9 (scores of 15 and 16 at first measurment, respectively) .  Status: Continued - Date(s): 12/10/21, 3/9/22, 6/9/22, 9/2/22, 12/1/22, 3/2/23, 6/6/23, 9/13/23    Intervention(s)  Therapist will  engage patient in person-centered " "therapy and CBT .    Patient has reviewed and agreed to the above plan.      CRUZ RAEMICAELA PAULINO JO, [facility]  September 13, 2023                                                   Randi Cleary          SAFETY PLAN:  Step 1: Warning signs / cues (Thoughts, images, mood, situation, behavior) that a crisis may be developing:  Thoughts: \"I don't want to continue\" \"I am unwanted\"  Images: none  Thinking Processes: ruminating  Mood: anger  Behaviors: isolating/withdrawing , can't stop crying, not taking care of myself and not taking care of my responsibilities  Situations: small triggers, such as not being able to find something, or dropping something   Step 2: Coping strategies - Things I can do to take my mind off of my problems without contacting another person (relaxation technique, physical activity):  Distress Tolerance Strategies:  arts and crafts: drawing, play with my pet , listen to positive and upbeat music: any, change body temperature (ice pack/cold water)  and paced breathing/progressive muscle relaxation  Physical Activities: go for a walk, deep breathing and stretching   Focus on helpful thoughts:  \"You've been through this before, you can get through it again.\"  Step 3: People and social settings that provide distraction:                 Name: Carmne                            Name: Darien                           Name: Aleida       pool, shopping, Carmen's house, [employer]       Step 4: Remind myself of people and things that are important to me and worth living for:  Clifford Little Donna, post-COVID world, options of what could be in your future        Step 5: When I am in crisis, I can ask these people to help me use my safety plan:                 Name: Sidney  Step 6: Making the environment safe:   go to sleep/daydream  Step 7: Professionals or agencies I can contact during a crisis:  [facility] Daytime Number: [phone]  Suicide Prevention Lifeline: 0-714-963-TXZU (3605)  Crisis Text Line " Service (available 24 hours a day, 7 days a week): Text [place] to 264111  Local Crisis Services: [facility] Crisis: [phone]  Adults can always access to the emPATH unit at [facility] (no phone number, utilize it like an urgent care or ER where you just show up)     Call 911 or go to my nearest emergency department.       I helped develop this safety plan and agree to use it when needed.  I have been given a copy of this plan.       Client signature _________________________________________________________________  Today s date:  11/24/2020  Adapted from Safety Plan Template 2008 Randi Poole and Robby Barba is reprinted with the express permission of the authors.  No portion of the Safety Plan Template may be reproduced without the express, written permission.  You can contact the authors at [email or web address] or [email or web address].

## 2023-10-11 ENCOUNTER — VIRTUAL VISIT (OUTPATIENT)
Dept: PSYCHOLOGY | Facility: CLINIC | Age: 68
End: 2023-10-11
Payer: MEDICARE

## 2023-10-11 ENCOUNTER — VIRTUAL VISIT (OUTPATIENT)
Dept: INTERNAL MEDICINE | Facility: CLINIC | Age: 68
End: 2023-10-11
Payer: MEDICARE

## 2023-10-11 DIAGNOSIS — F41.1 GENERALIZED ANXIETY DISORDER: ICD-10-CM

## 2023-10-11 DIAGNOSIS — G47.33 OSA (OBSTRUCTIVE SLEEP APNEA): ICD-10-CM

## 2023-10-11 DIAGNOSIS — F31.81 BIPOLAR 2 DISORDER (H): Primary | ICD-10-CM

## 2023-10-11 DIAGNOSIS — E89.0 POSTABLATIVE HYPOTHYROIDISM: ICD-10-CM

## 2023-10-11 DIAGNOSIS — E11.9 TYPE 2 DIABETES MELLITUS WITHOUT COMPLICATION, WITHOUT LONG-TERM CURRENT USE OF INSULIN (H): ICD-10-CM

## 2023-10-11 DIAGNOSIS — E83.110 HEREDITARY HEMOCHROMATOSIS (H): ICD-10-CM

## 2023-10-11 DIAGNOSIS — G62.9 NEUROPATHY: ICD-10-CM

## 2023-10-11 DIAGNOSIS — M48.02 CERVICAL STENOSIS OF SPINAL CANAL: ICD-10-CM

## 2023-10-11 DIAGNOSIS — Z00.00 PREVENTATIVE HEALTH CARE: ICD-10-CM

## 2023-10-11 DIAGNOSIS — L40.50 PSORIATIC ARTHROPATHY (H): ICD-10-CM

## 2023-10-11 DIAGNOSIS — F43.9 TRAUMA AND STRESSOR-RELATED DISORDER: ICD-10-CM

## 2023-10-11 DIAGNOSIS — M10.9 URIC ACID ARTHROPATHY: Primary | ICD-10-CM

## 2023-10-11 PROCEDURE — 99214 OFFICE O/P EST MOD 30 MIN: CPT | Mod: 95 | Performed by: INTERNAL MEDICINE

## 2023-10-11 PROCEDURE — 90837 PSYTX W PT 60 MINUTES: CPT | Mod: VID | Performed by: SOCIAL WORKER

## 2023-10-11 NOTE — PROGRESS NOTES
"Winnie is a 68 year old who is being evaluated via a billable video visit.      How would you like to obtain your AVS? MyChart  If the video visit is dropped, the invitation should be resent by: Send to e-mail at: [email or web address]  Will anyone else be joining your video visit? No  {If patient encounters technical issues they should call [phone] :888304}        {PROVIDER CHARTING PREFERENCE:211014}    Subjective   Winnie is a 68 year old, presenting for the following health issues:  Pre-Op Exam (Podiatrist procedure, wants to discuss most recent labs)      10/11/2023     4:19 PM   Additional Questions   Roomed by Chidi BEY RN       History of Present Illness       Diabetes:   She presents for follow up of diabetes.    She is not checking blood glucose.        She is concerned about other.   She is having numbness in feet, burning in feet, excessive thirst and weight loss.  The patient has had a diabetic eye exam in the last 12 months. Eye exam performed on new appt soon. Location of last eye exam [facility].        Reason for visit:  Pre-op, diabetes, test results, meds,    She eats 2-3 servings of fruits and vegetables daily.She consumes 0 sweetened beverage(s) daily.She exercises with enough effort to increase her heart rate 30 to 60 minutes per day.  She exercises with enough effort to increase her heart rate 3 or less days per week.   She is taking medications regularly.       {SUPERLIST (Optional):536584}  {additonal problems for provider to add (Optional):579051}      Review of Systems   {ROS COMP (Optional):052026}      Objective    Vitals - Patient Reported  Weight (Patient Reported): 100.7 kg (222 lb)  Height (Patient Reported): 167.6 cm (5' 6\")  BMI (Based on Pt Reported Ht/Wt): 35.83  Pain Score: Extreme Pain (8)  Pain Loc: Foot        Physical Exam   {video visit exam brief selected:257513}    {Diagnostic Test Results (Optional):177174}    {AMBULATORY ATTESTATION (Optional):924217}    " "    Video-Visit Details    Type of service:  Video Visit   Video Start Time: {video visit start/end time for provider to select:608045}  Video End Time:{video visit start/end time for provider to select:917145}    Originating Location (pt. Location): {video visit patient location:036273::\"Home\"}  {PROVIDER LOCATION On-site should be selected for visits conducted from your clinic location or adjoining [facility] hospital, academic office, or other nearby [facility] building. Off-site should be selected for all other provider locations, including home:660587}  Distant Location (provider location):  {virtual location provider:918595}  Platform used for Video Visit: {Virtual Visit Platforms:914841::\"iSTAR\"}      "

## 2023-10-11 NOTE — PROGRESS NOTES
Randi is a 68 year old female contacting the clinic today via video, who will use the platform: [employer] for the visit.  Phone # for [employer], or if Amwell drops:   Telephone Information:   Mobile [phone]          ASSESSMENT and PLAN:  1. Uric acid arthropathy  Continue allopurinol.  Okay to stop prednisone  - Uric acid; Future    2. Hereditary hemochromatosis (H24)  Monitor hemoglobin.  Phlebotomy deferred.  Suspect component of sleep apnea hypoxia  - CBC with Platelets & Differential; Future    3. Type 2 diabetes mellitus without complication, without long-term current use of insulin (H)  Monitor labs  - Basic metabolic panel; Future    4. Preventative health care  - REVIEW OF HEALTH MAINTENANCE PROTOCOL ORDERS    5. Generalized anxiety disorder  Makes history difficult    6. Psoriatic arthropathy (H)  - Erythrocyte sedimentation rate auto; Future  - CRP inflammation; Future    7. Neuropathy  Encourage again trial of topiramate and B12 shot    8. Postablative hypothyroidism  Recheck in consider increase  - TSH; Future    9. Cervical stenosis of spinal canal  On chronic narcotics through pain clinic.  Would like to decrease or eliminate    10. KYAW (obstructive sleep apnea)  Encourage compliance especially with upcoming surgery       Patient Instructions   Encourage again trial of vitamin B12 shot    Continue allopurinol    Discontinue prednisone    Begin topiramate but begin at 25 mg at bed    Would avoid gabapentin    Resume using CPAP    Consider empiric increase in thyroid    Update labs at podiatry appointment tomorrow    Referral to podiatry-done            Return in about 4 weeks (around 11/8/2023) for using a video visit.       CHIEF COMPLAINT:  Chief Complaint   Patient presents with    Pre-Op Exam     Podiatrist procedure, wants to discuss most recent labs           10/11/2023     4:19 PM   Additional Questions   Roomed by Chidi BEY RN       HISTORY OF PRESENT ILLNESS:  Randi is a 68 year old female  contacting the clinic today via video for review of labs and multiple questions.  At our September 21 video visit I confirmed that she has been diagnosed with diabetes in the past.  She desperately needs orthotic shoes and we discussed how we can get these for her.  I have referred her to a podiatrist, and she is about to have surgery from one of the preeminent orthopedic surgeons in [facility] regarding foot and ankle.  They are far more qualified to sort out what she needs for orthotic shoes.  If they can help her fill out the forms or tell her what the answers are I am happy to sign or they can take over.  She is seeing podiatry tomorrow, and having orthopedic surgery done on October 23    She had an echocardiogram and a mammogram done.  Results reviewed    Uric acid level greater than 6.  Further she was placed on allopurinol and prednisone.  With this she is urinating more, but has noticed improvement in the lancinating pains in her feet    No improvement in neuropathy.  Did not get suggested vitamin B12 shot-forgot.  Did not take topiramate but wonders whether she should take gabapentin at night for her neuropathy pain    Questions regarding vaccination and upcoming shots    History of Present Illness       Diabetes:   She presents for follow up of diabetes.    She is not checking blood glucose.        She is concerned about other.   She is having numbness in feet, burning in feet, excessive thirst and weight loss.  The patient has had a diabetic eye exam in the last 12 months. Eye exam performed on new appt soon. Location of last eye exam [facility].        Reason for visit:  Pre-op, diabetes, test results, meds,    She eats 2-3 servings of fruits and vegetables daily.She consumes 0 sweetened beverage(s) daily.She exercises with enough effort to increase her heart rate 30 to 60 minutes per day.  She exercises with enough effort to increase her heart rate 3 or less days per week.   She is taking  "medications regularly.      REVIEW OF SYSTEMS:   Increasing urination.  More dizzy.  Rash on flank improving    PFSH:  Social History     Social History Narrative    Not on file       TOBACCO USE:  History   Smoking Status    Former    Packs/day: 2.50    Years: 20.00    Types: Cigarettes    Start date: 5/1/1971    Quit date: 6/29/2000   Smokeless Tobacco    Never       VITALS:  There were no vitals filed for this visit.  There were no vitals taken for this visit. Estimated body mass index is 37.3 kg/m  as calculated from the following:    Height as of 8/15/23: 1.676 m (5' 5.98\").    Weight as of 9/28/23: 104.8 kg (231 lb).    PHYSICAL EXAM:  (observations via Video)  Nervous.  Anxious.  Sitting in her chair    Reports she is not using her CPAP    MEDICATIONS:   Current Outpatient Medications   Medication Sig Dispense Refill    albuterol (PROVENTIL) (2.5 MG/3ML) 0.083% neb solution Take 1 vial (2.5 mg) by nebulization every 4 hours as needed for shortness of breath / dyspnea or wheezing 360 mL 5    albuterol (VENTOLIN HFA) 108 (90 Base) MCG/ACT inhaler Inhale 2 puffs into the lungs every 6 hours 18 g 11    allopurinol (ZYLOPRIM) 300 MG tablet Take 1 tablet (300 mg) by mouth daily 30 tablet 11    benzonatate (TESSALON) 200 MG capsule Take 1 capsule (200 mg) by mouth 3 times daily as needed for cough 30 capsule 1    Cholecalciferol (VITAMIN D3) 250 MCG (44957 UT) TABS Take 1 tablet by mouth daily 47526/day      Cyanocobalamin (VITAMIN B-12) 5000 MCG SUBL Place 2-3 sprays under the tongue daily Unknown dose. 2 or 3 sprays/day      ethacrynic acid (EDECRIN) 25 MG tablet Take 1 tablet (25 mg) by mouth every other day 45 tablet 3    Fluticasone-Umeclidin-Vilanterol (TRELEGY ELLIPTA) 200-62.5-25 MCG/INH oral inhaler Inhale 1 puff into the lungs daily 4 each 3    KLOR-CON 20 MEQ CR tablet Take 1 tablet (20 mEq total) by mouth 2 (two) times a day. 180 tablet 3    magnesium 250 MG tablet Take 1 tablet by mouth 2 times daily   "    medical cannabis (Patient's own supply) See Admin Instructions (The purpose of this order is to document that the patient reports taking medical cannabis.  This is not a prescription, and is not used to certify that the patient has a qualifying medical condition.)  Flower      omeprazole (PRILOSEC) 20 MG DR capsule Take 1 capsule (20 mg) by mouth daily 90 capsule 3    oxyCODONE (ROXICODONE) 5 MG tablet Take 1 tablet (5 mg) by mouth every 6 hours as needed for pain (chronic pain) Dispense/start 10/3/23 56 tablet 0    rOPINIRole (REQUIP) 2 MG tablet One tab 3 pm, one bedtime, and one during night on waking 90 tablet 3    STATIN NOT PRESCRIBED (INTENTIONAL) Please choose reason not prescribed from choices below.      SYNTHROID 150 MCG tablet Take 1 tablet (150 mcg) by mouth daily 90 tablet 4    topiramate (TOPAMAX) 25 MG tablet Take 1 tablet (25 mg) by mouth 2 times daily 60 tablet 11    vitamin E 400 units TABS Take 800 Units by mouth daily         Outside Notes summarized: Multiple my charts  Labs, x-rays, cardiology, GI tests reviewed: Erythrocytosis and low iron.  Uric acid 6.7  Recent Labs   Lab Test 09/25/23  1018 07/25/23  1643 07/14/23  1110 07/05/23  1414 05/01/23  0742 01/18/23  1307 06/07/22  0735 02/23/22  1339 12/13/21  1407 11/16/21  0818   HGB 17.6* 16.6*  --   --    < > 17.2*   < > 15.4   < > 16.5*   WBC 10.3 7.6  --   --    < > 6.4   < > 6.0   < > 7.4   NA  --   --   --  142  --  141   < > 139   < > 138   POTASSIUM  --   --   --  4.2  --  4.5   < > 3.8   < > 4.1   CR 0.69  --   --  0.65  --  0.64   < > 0.61   < > 0.57   A1C  --   --  5.8*  --   --  5.6   < > 5.5  --   --    URIC 6.7*  --   --   --   --   --   --   --   --   --    B12  --  987  --   --   --   --   --  373  --   --    TSH  --   --   --  3.18  --  1.58   < > 1.14  --   --    VITDT  --  38  --   --   --   --   --  43  --   --    CRP  --   --   --   --   --   --   --   --   --  <2.9    < > = values in this interval not displayed.     Lab  Results   Component Value Date    CGUSP30CEO Negative 12/17/2021    WLGNZ75MEB POSITIVE 06/04/2021     Lab Results   Component Value Date    CHOL 240 07/05/2023    CHOL 180 05/18/2021     New orders:   Orders Placed This Encounter   Procedures    REVIEW OF HEALTH MAINTENANCE PROTOCOL ORDERS    TSH    Basic metabolic panel    Uric acid    Erythrocyte sedimentation rate auto    CRP inflammation    CBC with Platelets & Differential       Independent review of:     Laith Constantino MD  [facility]    Video-Visit Details  Type of service:  Video Visit  Patient has given verbal consent to a Video visit?  Yes  How would you like to obtain your AVS?  MyChart  Will anyone else be joining your video visit, giving supplemental history? No    Originating location (pt location): Home    Distant Location (provider location):  Off-site    Video Start Time: 5:33 PM  Video End time:  6:05 PM  Face to face plus orders:  32 minutes  Documentation time:  3 minutes     The visit lasted a total of 35 minutes

## 2023-10-11 NOTE — PROGRESS NOTES
Answers submitted by the patient for this visit:  Diabetes Visit (Submitted on 10/11/2023)  Chief Complaint: Chronic problems general questions HPI Form  Frequency of checking blood sugars:: not at all  Diabetic concerns:: other  Paraesthesia present:: numbness in feet, burning in feet, excessive thirst, weight loss  Have you had a diabetic eye exam within the last year?: Yes  Date of last eye exam: : new appt soon  Where was this eye exam done?: [facility]  General Questionnaire (Submitted on 10/11/2023)  Chief Complaint: Chronic problems general questions HPI Form  What is the reason for your visit today? : pre-op, diabetes, test results, meds,  How many servings of fruits and vegetables do you eat daily?: 2-3  On average, how many sweetened beverages do you drink each day (Examples: soda, juice, sweet tea, etc.  Do NOT count diet or artificially sweetened beverages)?: 0  How many minutes a day do you exercise enough to make your heart beat faster?: 30 to 60  How many days a week do you exercise enough to make your heart beat faster?: 3 or less  How many days per week do you miss taking your medication?: 0

## 2023-10-11 NOTE — PROGRESS NOTES
[facility]                                     Progress Note    Patient Name: Randi Cleary  Date: 10/11/23         Service Type: Individual     Session Start Time: 3:06 PM Session End Time: 4:00 PM     Session Length: 54 minutes    Session #: 206    Attendees: Client attended alone    Service Modality:  Video Visit:      Provider verified identity through the following two step process.  Patient provided:  Patient is known previously to provider    Telemedicine Visit: The patient's condition can be safely assessed and treated via synchronous audio and visual telemedicine encounter.      Reason for Telemedicine Visit: Patient convenience (e.g. access to timely appointments / distance to available provider)    Originating Site (Patient Location): Patient's home    Distant Site (Provider Location): Provider Remote Setting- Home Office    Consent:  The patient/guardian has verbally consented to: the potential risks and benefits of telemedicine (video visit) versus in person care; bill my insurance or make self-payment for services provided; and responsibility for payment of non-covered services.     Patient would like the video invitation sent by:  My Chart    Mode of Communication:  Video Conference via [facility]    Distant Location (Provider):  Off-site    As the provider I attest to compliance with applicable laws and regulations related to telemedicine.      DATA  Extended Session (53+ minutes): No  Interactive Complexity: No  Crisis: No        Progress Since Last Session (Related to Symptoms / Goals / Homework):   Symptoms:  Patient is making some progress, taking steps, such as getting her bicycle set up on a  for post surgery      Homework:  Progress made  Schedule shoulder surgery/see Lervick -not done  Schedule foot insert -done       Episode of Care Goals: Satisfactory progress - ACTION (Actively working towards change); Intervened by reinforcing change plan / affirming steps  taken     Current / Ongoing Stressors and Concerns:   Patient is currently socially isolated. She has a conflictual relationship with her .  She is getting minimal physical activity.  She has had several surgeries. Patient's car was just insured.     Treatment Objective(s) Addressed in This Session:   Patient will increase frequency of engaging her in ADLs.  Patient will track and record at least 5 pleasant exchanges with . Patient will be able to identify at least 5 positive traits about her .  Patient will reduce level of depressive and anxious features as evidenced by reduction in score on her CHAVO-7 and PHQ-9 (scores of 15 and 16 at first measurment, respectively).     Intervention:   Supportive Therapy:   Reviewed positive changes she made with getting her bike ready for post surgery movement. Looked at her past injuries and how they have impacted her and how she is preparing to deal with them now. Discussed relationship with friend and with  and how she'd like to manage these.      The following assessments were completed by patient for this visit:  PHQ9:       8/21/2023     8:11 AM 9/5/2023     1:27 PM 9/13/2023    10:57 AM 9/18/2023     9:51 AM 9/20/2023    10:10 AM 9/27/2023     4:45 AM 10/9/2023     8:52 AM   PHQ-9 SCORE   PHQ-9 Total Score MyChart 13 (Moderate depression) 19 (Moderately severe depression) 17 (Moderately severe depression) 18 (Moderately severe depression) 18 (Moderately severe depression) 17 (Moderately severe depression) 18 (Moderately severe depression)   PHQ-9 Total Score 13 19 17 18 18 17    17 18     GAD7:       7/6/2023     9:11 AM 7/21/2023    10:34 AM 8/7/2023     1:03 PM 8/21/2023    11:07 AM 9/5/2023     1:30 PM 9/20/2023     9:59 AM 10/9/2023     8:55 AM   CHAVO-7 SCORE   Total Score 15 (severe anxiety) 18 (severe anxiety) 18 (severe anxiety) 13 (moderate anxiety) 15 (severe anxiety) 16 (severe anxiety) 15 (severe anxiety)   Total Score 15    15 18 18     18 13 15 16    16    16 15     PROMIS 10-Global Health (only subscores and total score):       6/8/2023    12:05 PM 6/22/2023    10:03 AM 7/6/2023     9:12 AM 7/21/2023    10:36 AM 7/28/2023     3:24 PM 8/7/2023     1:07 PM 9/20/2023    10:13 AM   PROMIS-10 Scores Only   Global Mental Health Score 6    6 5    5 5    5 5    5  6    6     5    5   Global Physical Health Score 9    9 7    7 8    8 7    7  8    8     9    9   PROMIS TOTAL - SUBSCORES 15    15 12    12 13    13 12    12  14    14     14    14       Information is confidential and restricted. Go to Review Flowsheets to unlock data.    Multiple values from one day are sorted in reverse-chronological order        ASSESSMENT: Current Emotional / Mental Status (status of significant symptoms):   Risk status (Self / Other harm or suicidal ideation)   Patient denies current fears or concerns for personal safety.   Patient denies current or recent suicidal ideation or behaviors.   Patient denies current or recent homicidal ideation or behaviors.   Patient denies current or recent self injurious behavior or ideation.   Patient denies other safety concerns.   Patient reports there has been no change in risk factors since their last session.     Patient reports there has been no change in protective factors since their last session.     A safety and risk management plan has been developed including: Patient consented to co-developed safety plan on 11/24/2020, updated 2/20/23.  Safety and risk management plan was reviewed.   Patient agreed to use safety plan should any safety concerns arise.  A copy was made available to the patient.     Appearance:   Appropriate    Eye Contact:   Good    Psychomotor Behavior: Normal    Attitude:   Cooperative    Orientation:   All   Speech    Rate / Production: Normal/ Responsive    Volume:  Normal    Mood:    Anxious    Affect:    Appropriate  Tearful   Thought Content:  Clear    Thought Form:  Coherent    Insight:    Good       Medication Review:   No changes to current psychiatric medication(s)     Medication Compliance:   Yes     Changes in Health Issues:   None reported     Chemical Use Review:   Substance Use: Chemical use reviewed, no active concerns identified Nothing used since 2021.     Tobacco Use: No current tobacco use.      Diagnosis:  1. Bipolar 2 disorder (H)    2. Generalized anxiety disorder    3. Trauma and stressor-related disorder      Collateral Reports Completed:   Not Applicable    PLAN: (Patient Tasks / Therapist Tasks / Other)  Schedule shoulder surgery/see Lervick  Schedule foot insert        There has been demonstrated improvement in functioning while patient has been engaged in psychotherapy/psychological service- if withdrawn the patient would deteriorate and/or relapse.     MICAELA SLADE, [facility]   2023                                                        ______________________________________________________________________    Individual Treatment Plan    Patient's Name: Randi Cleary  YOB: 1955    Date of Creation: 20  Date Treatment Plan Last Reviewed/Revised: 23    DSM5 Diagnoses: 296.89 Bipolar II Disorder Depressed, 300.02 (F41.1) Generalized Anxiety Disorder, or Adjustment Disorders  309.89 (F43.8) Other Specified Trauma and Stressor Related Disorder  Psychosocial / Contextual Factors: Patient's entire family of origin has , she now has a sister-in-law and  as support.  Relationship with  is conflictual. She is recovering from surgeries  PROMIS (reviewed every 90 days): PROMIS-10 Scores  PROMIS 10-Global Health (only subscores and total score):   PROMIS-10 Scores Only 2021 3/15/2022 3/15/2022 3/15/2022 3/24/2022 2022 2022   Global Mental Health Score 6 6 6 6 8 12 12   Global Physical Health Score 9 9 9 9 8 11 10   PROMIS TOTAL - SUBSCORES 15 15 15 15 16 23 22       Referral / Collaboration:  Referral to another  "professional/service is not indicated at this time..    Anticipated number of session for this episode of care: 50  Anticipation frequency of session: Biweekly  Anticipated Duration of each session: 53 or more minutes due to intensity of trauma symptoms  Treatment plan will be reviewed in 90 days or when goals have been changed.   There has been demonstrated improvement in functioning while patient has been engaged in psychotherapy/psychological service- if withdrawn the patient would deteriorate and/or relapse.       MeasurableTreatment Goal(s) related to diagnosis / functional impairment(s)  Goal 1: Patient will \"jumpstart, getting going with the things I need to be doing around the house as far as picking up, doing things, trying to do something every day.  Also to lessen the animosity between me and my .\"    I will know I've met my goal when my shoulders are fixed and I can see.      Objective #A (Patient Action)    Patient will  increase frequency of engaging her in ADLs .  Status: Continued - Date(s): 12/10/21, 3/9/22, 6/9/22, 9/2/22, 12/1/22, 3/2/23, 6/6/23, 9/13/23    Intervention(s)  Therapist will  engage patient in CBT, specifically behavioral activation .    Objective #B  Patient will   track and record at least 5 pleasant exchanges with . Patient will be able to identify at least 5 positive traits about her  and how he relates to her .  Status: Continued - Date(s): 12/10/21, 3/9/22, 6/9/22, 9/2/22, 12/1/22, 3/2/23, 6/6/23, 9/13/23    Intervention(s)  Therapist will  teach assertiveness skills and assign homework related to relationship interactions .    Objective #C  Patient will  reduce level of depressive and anxious features as evidenced by reduction in score on her CHAVO-7 and PHQ-9 (scores of 15 and 16 at first measurment, respectively) .  Status: Continued - Date(s): 12/10/21, 3/9/22, 6/9/22, 9/2/22, 12/1/22, 3/2/23, 6/6/23, 9/13/23    Intervention(s)  Therapist will  engage " "patient in person-centered therapy and CBT .    Patient has reviewed and agreed to the above plan.      CRUZ MICAELA BAKER JO, [facility]  September 13, 2023                                                   Randi Cleary          SAFETY PLAN:  Step 1: Warning signs / cues (Thoughts, images, mood, situation, behavior) that a crisis may be developing:  Thoughts: \"I don't want to continue\" \"I am unwanted\"  Images: none  Thinking Processes: ruminating  Mood: anger  Behaviors: isolating/withdrawing , can't stop crying, not taking care of myself and not taking care of my responsibilities  Situations: small triggers, such as not being able to find something, or dropping something   Step 2: Coping strategies - Things I can do to take my mind off of my problems without contacting another person (relaxation technique, physical activity):  Distress Tolerance Strategies:  arts and crafts: drawing, play with my pet , listen to positive and upbeat music: any, change body temperature (ice pack/cold water)  and paced breathing/progressive muscle relaxation  Physical Activities: go for a walk, deep breathing and stretching   Focus on helpful thoughts:  \"You've been through this before, you can get through it again.\"  Step 3: People and social settings that provide distraction:                 Name: Carmen                            Name: Darien                           Name: Aleida       pool, shopping, Carmen's house, [employer]       Step 4: Remind myself of people and things that are important to me and worth living for:  Clifford Little Donna, post-COVID world, options of what could be in your future        Step 5: When I am in crisis, I can ask these people to help me use my safety plan:                 Name: Sidney  Step 6: Making the environment safe:   go to sleep/daydream  Step 7: Professionals or agencies I can contact during a crisis:  [facility] Daytime Number: [phone]  Suicide Prevention Lifeline: " 0-547-403-TALK (8501)  Crisis Text Line Service (available 24 hours a day, 7 days a week): Text [place] to 783767  Local Crisis Services: [facility] Crisis: [phone]  Adults can always access to the emPATH unit at [facility] (no phone number, utilize it like an urgent care or ER where you just show up)     Call 911 or go to my nearest emergency department.       I helped develop this safety plan and agree to use it when needed.  I have been given a copy of this plan.       Client signature _________________________________________________________________  Today s date:  11/24/2020  Adapted from Safety Plan Template 2008 Randi Poole and Robby Barba is reprinted with the express permission of the authors.  No portion of the Safety Plan Template may be reproduced without the express, written permission.  You can contact the authors at [email or web address] or [email or web address].

## 2023-10-11 NOTE — PATIENT INSTRUCTIONS
Encourage again trial of vitamin B12 shot    Continue allopurinol    Discontinue prednisone    Begin topiramate but begin at 25 mg at bed    Would avoid gabapentin    Resume using CPAP    Consider empiric increase in thyroid    Update labs at podiatry appointment tomorrow    Referral to podiatry-done

## 2023-10-12 ENCOUNTER — OFFICE VISIT (OUTPATIENT)
Dept: PODIATRY | Facility: CLINIC | Age: 68
End: 2023-10-12
Payer: MEDICARE

## 2023-10-12 VITALS
SYSTOLIC BLOOD PRESSURE: 130 MMHG | DIASTOLIC BLOOD PRESSURE: 84 MMHG | TEMPERATURE: 97.1 F | OXYGEN SATURATION: 94 % | HEART RATE: 100 BPM

## 2023-10-12 DIAGNOSIS — M20.42 HAMMER TOES OF BOTH FEET: Primary | ICD-10-CM

## 2023-10-12 DIAGNOSIS — E11.9 TYPE 2 DIABETES MELLITUS WITHOUT COMPLICATION, WITHOUT LONG-TERM CURRENT USE OF INSULIN (H): ICD-10-CM

## 2023-10-12 DIAGNOSIS — M20.41 HAMMER TOES OF BOTH FEET: Primary | ICD-10-CM

## 2023-10-12 DIAGNOSIS — G62.9 NEUROPATHY: ICD-10-CM

## 2023-10-12 DIAGNOSIS — Q66.72 CAVUS DEFORMITY OF LEFT FOOT: ICD-10-CM

## 2023-10-12 PROCEDURE — 99203 OFFICE O/P NEW LOW 30 MIN: CPT | Performed by: PODIATRIST

## 2023-10-12 ASSESSMENT — PAIN SCALES - GENERAL: PAINLEVEL: SEVERE PAIN (6)
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
## 2023-10-12 NOTE — PROGRESS NOTES
Clinic Care Coordination Contact  Follow Up Progress Note      Assessment: CCC RN  met with patient today to assist with completing paperwork for her orthotics. Writer assisted patient with completed her position of the paperwork and forwarded it to her PCP to complete his portion. Received back from PCP and faxed to [facility] per her request.     Patient reported she did meet with her new psychiatric program and said she felt comfortable working with him. She stated she was not started on any new medications at this time, but was referred to a Women's Therapy Group. Patient reported she continues to work closely with therapist Mary Kay Gomez and continues to feel support my her. No other needs or concerns.     Care Gaps:    Health Maintenance Due   Topic Date Due    HF ACTION PLAN  Never done    DIABETIC FOOT EXAM  Never done    ADVANCE CARE PLANNING  Never done    COPD ACTION PLAN  Never done    ZOSTER IMMUNIZATION (1 of 2) Never done    HEPATITIS B IMMUNIZATION (1 of 3 - Risk 3-dose series) Never done    RSV VACCINE 60+ (1 - 1-dose 60+ series) Never done    MEDICARE ANNUAL WELLNESS VISIT  Never done    EYE EXAM  12/07/2021    INFLUENZA VACCINE (1) 09/01/2023    COVID-19 Vaccine (7 - 2023-24 season) 09/08/2023    A1C  10/14/2023       Patient accepted scheduling phone number for PCP  to schedule independently     Care Plans  Care Plan: Mental Health       Problem: Mental Health Symptoms Need Improvement       Goal: I would like to feel as though my mental health has improved.       Start Date: 9/25/2023 Expected End Date: 9/24/2024    This Visit's Progress: 10%    Priority: High    Note:     Barriers: history of anxiety, bipolar disorder, trauma related disorder  Strengths: strong advocate for herself  Patient expressed understanding of goal: yes  Action steps to achieve this goal:  1. I will continue to attend all appointments with my therapist Mary Kay Gomez and with my new psychiatric provide.    2. I will continue to attend all appointments with the [facility] partial-hospitalization program.   3. I will continue to the use the skills I have learned through therapy and the partial hospitalization program.   4. I will take my medications as directed.   5. I will report progress towards this goal at schedule outreach telephone calls from my Care Coordinator.   3. I will                                Intervention/Education provided during outreach: Discussed the importance of taking her medications as directed. Encouraged her to attend all scheduled follow up appointments. Encouraged her to contact Care Coordination for any needs or concerns.           Plan: CCC Rn will continue to monitor, support patient with current goal and will be available to assist as nursing needs arise.     Care Coordinator will follow up in one month.

## 2023-10-12 NOTE — PROGRESS NOTES
FOOT AND ANKLE SURGERY/PODIATRY CONSULT NOTE           ASSESSMENT:   Cavus foot deformity  Type 2 diabetes without complication  Hammertoe deformities bilaterally      TREATMENT:  I have recommended extra-depth diabetic shoes with molded insoles        HPI: I was asked to see Randi RIVERA Cathy Evin today complaining of a painful callus on the bottom of the right foot.  The patient stated she also has numbness involving both feet and both legs.  She was recently diagnosed with prediabetes.  She stated that several years ago she had an ankle fusion of the right ankle.  She had a takedown and ankle replacement performed.  She is scheduled to have hammertoe corrections of both feet.  The patient stated she has a difficult time with weightbearing and ambulation.  She has not had any redness or swelling of her feet.  Her symptoms are mild to moderate..  The patient was seen in consultation at the request of Laith Constantino MD for annual diabetic foot check.     Past Medical History:   Diagnosis Date    Bipolar 2 disorder (H)     Breast cancer (H) 1986    lumpectomy, radiation, chemo    Chronic pain syndrome     COPD (chronic obstructive pulmonary disease) (H)     asthma    Cord compression (H) 12/21/2021    Dizzy     Drug tolerance     opioid    Esophageal reflux     Fatigue     Generalized anxiety disorder     Graves disease 1994    Hemochromatosis 02/14/2018    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 Created by Conversion  Replacement Utility updated for latest IMO load Formatting of this note might be different from the original. Created by Conversion  Replacement Utility updated for latest IMO load    History of corticosteroid therapy 11/19/2019    History of partial adrenalectomy (H24) 11/19/2019    History of pheochromocytoma 11/19/2019    Hx antineoplastic chemotherapy     Hx of radiation therapy     Hyperlipidaemia      Hypertension     Impaired fasting glucose 2017    Injury of neck, whiplash 07/15/2021    Joint pain     KYAW (obstructive sleep apnea) 2016    Osteopenia     Pheochromocytoma, left 2017    laparoscopically removed    Postablative hypothyroidism 1995    Prediabetes 10/03/2019    by A1c    Psoriasis     Psoriatic arthropathy (H)     Right rotator cuff tear     RLS (restless legs syndrome)     on ropinorole    Sacroiliitis (H24)     Serotonin syndrome 2020    [facility] - While on desvenlafaxine 100mg    Snoring     Spinal stenosis     Status post coronary angiogram 10/03/2019    Urinary incontinence     Vitamin B 12 deficiency 2009    Vitamin D deficiency 2010       Social History     Socioeconomic History    Marital status:      Spouse name: Not on file    Number of children: Not on file    Years of education: Not on file    Highest education level: Not on file   Occupational History    Not on file   Tobacco Use    Smoking status: Former     Packs/day: 2.50     Years: 20.00     Additional pack years: 0.00     Total pack years: 50.00     Types: Cigarettes     Start date: 1971     Quit date: 2000     Years since quittin.3    Smokeless tobacco: Never   Vaping Use    Vaping Use: Never used   Substance and Sexual Activity    Alcohol use: Not Currently     Comment: relapse 2021 sober     Drug use: Yes     Types: Marijuana    Sexual activity: Not on file   Other Topics Concern    Parent/sibling w/ CABG, MI or angioplasty before 65F 55M? Yes   Social History Narrative    Not on file     Social Determinants of Health     Financial Resource Strain: Low Risk  (2023)    Financial Resource Strain     Within the past 12 months, have you or your family members you live with been unable to get utilities (heat, electricity) when it was really needed?: No   Food Insecurity: Low Risk  (2023)    Food Insecurity     Within the past 12 months, did you worry that your food would run  "out before you got money to buy more?: No     Within the past 12 months, did the food you bought just not last and you didn t have money to get more?: No   Transportation Needs: High Risk (9/21/2023)    Transportation Needs     Within the past 12 months, has lack of transportation kept you from medical appointments, getting your medicines, non-medical meetings or appointments, work, or from getting things that you need?: Yes   Physical Activity: Not on file   Stress: Not on file   Social Connections: Not on file   Interpersonal Safety: Low Risk  (9/25/2023)    Interpersonal Safety     Do you feel physically and emotionally safe where you currently live?: Yes     Within the past 12 months, have you been hit, slapped, kicked or otherwise physically hurt by someone?: No     Within the past 12 months, have you been humiliated or emotionally abused in other ways by your partner or ex-partner?: No   Housing Stability: High Risk (9/21/2023)    Housing Stability     Do you have housing? : No     Are you worried about losing your housing?: Yes          Allergies   Allergen Reactions    Serotonin Reuptake Inhibitors (Ssris) Anxiety, Difficulty breathing, Headache, Palpitations and Shortness Of Breath    Buspirone      The patient states she had serotonin syndrome    Cephalexin      Other reaction(s): unknown rxn.    Desvenlafaxine      Serotonin syndrome    Diclofenac Sodium [Diclofenac]      Serotonin syndrome and restless legs syndrome    Gabapentin      Drove on the wrong side of the highway    Levofloxacin      \"CAN'T REMEMBER\"    Penicillins      \"SORES IN MOUTH\"    Riluzole Difficulty breathing and Swelling    Sulfa Antibiotics      \"PT DOES NOT KNOW WHAT THE REACTION WAS\"          Current Outpatient Medications:     albuterol (PROVENTIL) (2.5 MG/3ML) 0.083% neb solution, Take 1 vial (2.5 mg) by nebulization every 4 hours as needed for shortness of breath / dyspnea or wheezing, Disp: 360 mL, Rfl: 5    albuterol " (VENTOLIN HFA) 108 (90 Base) MCG/ACT inhaler, Inhale 2 puffs into the lungs every 6 hours, Disp: 18 g, Rfl: 11    allopurinol (ZYLOPRIM) 300 MG tablet, Take 1 tablet (300 mg) by mouth daily, Disp: 30 tablet, Rfl: 11    benzonatate (TESSALON) 200 MG capsule, Take 1 capsule (200 mg) by mouth 3 times daily as needed for cough, Disp: 30 capsule, Rfl: 1    Cholecalciferol (VITAMIN D3) 250 MCG (67629 UT) TABS, Take 1 tablet by mouth daily 37450/day, Disp: , Rfl:     Cyanocobalamin (VITAMIN B-12) 5000 MCG SUBL, Place 2-3 sprays under the tongue daily Unknown dose. 2 or 3 sprays/day, Disp: , Rfl:     ethacrynic acid (EDECRIN) 25 MG tablet, Take 1 tablet (25 mg) by mouth every other day, Disp: 45 tablet, Rfl: 3    Fluticasone-Umeclidin-Vilanterol (TRELEGY ELLIPTA) 200-62.5-25 MCG/INH oral inhaler, Inhale 1 puff into the lungs daily, Disp: 4 each, Rfl: 3    KLOR-CON 20 MEQ CR tablet, Take 1 tablet (20 mEq total) by mouth 2 (two) times a day., Disp: 180 tablet, Rfl: 3    magnesium 250 MG tablet, Take 1 tablet by mouth 2 times daily, Disp: , Rfl:     medical cannabis (Patient's own supply), See Admin Instructions (The purpose of this order is to document that the patient reports taking medical cannabis.  This is not a prescription, and is not used to certify that the patient has a qualifying medical condition.) Flower, Disp: , Rfl:     omeprazole (PRILOSEC) 20 MG DR capsule, Take 1 capsule (20 mg) by mouth daily, Disp: 90 capsule, Rfl: 3    oxyCODONE (ROXICODONE) 5 MG tablet, Take 1 tablet (5 mg) by mouth every 6 hours as needed for pain (chronic pain) Dispense/start 10/3/23, Disp: 56 tablet, Rfl: 0    rOPINIRole (REQUIP) 2 MG tablet, One tab 3 pm, one bedtime, and one during night on waking, Disp: 90 tablet, Rfl: 3    STATIN NOT PRESCRIBED (INTENTIONAL), Please choose reason not prescribed from choices below., Disp: , Rfl:     SYNTHROID 150 MCG tablet, Take 1 tablet (150 mcg) by mouth daily, Disp: 90 tablet, Rfl: 4     topiramate (TOPAMAX) 25 MG tablet, Take 1 tablet (25 mg) by mouth 2 times daily, Disp: 60 tablet, Rfl: 11    vitamin E 400 units TABS, Take 800 Units by mouth daily, Disp: , Rfl:     Current Facility-Administered Medications:     cyanocobalamin injection 1,000 mcg, 1,000 mcg, Intramuscular, Q30 Days, Laith Constantino MD     Family History   Problem Relation Age of Onset    Lupus Sister     Hypertension Sister     Obesity Sister     Scleroderma Sister     Heart Failure Sister     Hemochromatosis Sister     Hemochromatosis Brother     Hypertension Brother     Alcoholism Brother     Substance Abuse Brother     Hemochromatosis Father     Myocardial Infarction Father     Snoring Father     Heart Disease Father     Obesity Father     Coronary Artery Disease Father          at age 53 of heart attack    Hypertension Father     Genetic Disorder Father         Hemachromatosis    Obesity Mother     Thyroid Disease Mother     Arthritis Mother     Hypertension Mother     Osteoporosis Mother     Mental Illness Maternal Uncle     Alcoholism Maternal Grandfather     Obesity Sister     Cardiovascular Sister     Hypertension Sister     Arthritis Sister     Hemochromatosis Sister     Lupus Sister     Scleroderma Sister     Coronary Artery Disease Sister     Genetic Disorder Sister         Lupus, Hemachromatosis    Cardiovascular Brother     Hypertension Brother     Arthritis Brother     Hemochromatosis Brother     Prostate Cancer Brother     Genetic Disorder Brother         Hemachromatosis    Obesity Brother     Cardiovascular Maternal Grandmother     Coronary Artery Disease Maternal Grandmother     Osteoporosis Maternal Grandmother     Cerebrovascular Disease Paternal Grandmother     Adrenal Disorder No family hx of     Breast Cancer No family hx of         Social History     Socioeconomic History    Marital status:      Spouse name: Not on file    Number of children: Not on file    Years of education: Not on file     Highest education level: Not on file   Occupational History    Not on file   Tobacco Use    Smoking status: Former     Packs/day: 2.50     Years: 20.00     Additional pack years: 0.00     Total pack years: 50.00     Types: Cigarettes     Start date: 1971     Quit date: 2000     Years since quittin.3    Smokeless tobacco: Never   Vaping Use    Vaping Use: Never used   Substance and Sexual Activity    Alcohol use: Not Currently     Comment: relapse 2021 sober     Drug use: Yes     Types: Marijuana    Sexual activity: Not on file   Other Topics Concern    Parent/sibling w/ CABG, MI or angioplasty before 65F 55M? Yes   Social History Narrative    Not on file     Social Determinants of Health     Financial Resource Strain: Low Risk  (2023)    Financial Resource Strain     Within the past 12 months, have you or your family members you live with been unable to get utilities (heat, electricity) when it was really needed?: No   Food Insecurity: Low Risk  (2023)    Food Insecurity     Within the past 12 months, did you worry that your food would run out before you got money to buy more?: No     Within the past 12 months, did the food you bought just not last and you didn t have money to get more?: No   Transportation Needs: High Risk (2023)    Transportation Needs     Within the past 12 months, has lack of transportation kept you from medical appointments, getting your medicines, non-medical meetings or appointments, work, or from getting things that you need?: Yes   Physical Activity: Not on file   Stress: Not on file   Social Connections: Not on file   Interpersonal Safety: Low Risk  (2023)    Interpersonal Safety     Do you feel physically and emotionally safe where you currently live?: Yes     Within the past 12 months, have you been hit, slapped, kicked or otherwise physically hurt by someone?: No     Within the past 12 months, have you been humiliated or emotionally abused in other  ways by your partner or ex-partner?: No   Housing Stability: High Risk (9/21/2023)    Housing Stability     Do you have housing? : No     Are you worried about losing your housing?: Yes        Review of Systems - Patient denies fever, chills, rash, wound, stiffness,  numbness, weakness, heart burn, blood in stool, chest pain with activity, calf pain when walking, shortness of breath with activity, chronic cough, easy bleeding/bruising, swelling of ankles, excessive thirst, fatigue, depression, anxiety.  Patient admits to hammertoes both feet.      OBJECTIVE:  Appearance: alert, well appearing, and in no distress.    There were no vitals taken for this visit.     There is no height or weight on file to calculate BMI.     General appearance: Patient is alert and fully cooperative with history & exam.  No sign of distress is noted during the visit.  Psychiatric: Affect is pleasant & appropriate.  Patient appears motivated to improve health.  Respiratory: Breathing is regular & unlabored while sitting.  HEENT: Hearing is intact to spoken word.  Speech is clear.  No gross evidence of visual impairment that would impact ambulation.    Vascular: Dorsalis pedis and posterior tibial pulses are palpable. There is no pedal hair growth bilaterally.  CFT < 3 sec from anterior tibial surface to distal digits bilaterally. There is no appreciable edema noted.  Dermatologic: Turgor and texture are within normal limits. No coloration or temperature changes. No primary or secondary lesions noted.  Neurologic: All epicritic and proprioceptive sensations are grossly intact bilaterally.  Musculoskeletal: All active and passive ankle, subtalar, midtarsal, and 1st MPJ range of motion are grossly intact without pain or crepitus, with the exception of none. Manual muscle strength is within normal limits bilaterally. All dorsiflexors, plantarflexors, invertors, evertors are intact bilaterally.  No tenderness present to bilateral feet or  ankles on palpation.  No  tenderness to bilateral feet or ankles with range of motion.  There is an increased height of the medial longitudinal arch noted.  Calf is soft/non-tender without warmth/induration    Imaging:       No images are attached to the encounter or orders placed in the encounter.     Echocardiogram Complete    Result Date: 10/5/2023  [id number]^SOPHIA^MINO^FLAKITO  [facility] Diagnostic and Treatment-3rd Floor [address] [place] [address]  Name: ERIK BOSS MRN: [id number] : 1955 Study Date: 10/05/2023 11:11 AM Age: 68 yrs Gender: Female Patient [facility] Reason For Study: Pulmonary HTN (H), Shortness of breath Ordering Physician: MINO CORTES Referring Physician: MINO CORTES Performed By: Brijesh Briceño  BSA: 2.1 m2 Height: 66 in Weight: 231 lb HR: 75 BP: 122/76 mmHg ______________________________________________________________________________ Procedure Echocardiogram with two-dimensional, color and spectral Doppler performed. ______________________________________________________________________________ Interpretation Summary Global and regional left ventricular function is normal with an EF of 55-60% with normal size and thickness.  Global right ventricular function and size are normal.  Trace to mild aortic insufficiency is present.  No pericardial effusion is present.  This study was compared with the study from 19 . No significant changes noted.  ______________________________________________________________________________ Left Ventricle Left ventricular wall thickness is normal. Left ventricular size is normal. Global and regional left ventricular function is normal with an EF of 55-60%. Left ventricular diastolic function is not assessable.  Right Ventricle The right ventricle is normal size. Global right ventricular function is normal.  Atria Both atria  appear normal.  Mitral Valve Mild to moderate mitral annular calcification is present. Mild mitral insufficiency is present.  Aortic Valve The aortic valve is tricuspid. Trace to mild aortic insufficiency is present.  Tricuspid Valve Trace tricuspid insufficiency is present. The peak velocity of the tricuspid regurgitant jet is not obtainable. Pulmonary artery systolic pressure cannot be assessed.  Pulmonic Valve The valve leaflets are not well visualized. On Doppler interrogation, there is no significant stenosis or regurgitation.  Vessels The aorta root is normal. The thoracic aorta is normal. Sinuses of Valsalva 3.0 cm. Ascending aorta 3.3 cm. IVC diameter <2.1 cm collapsing >50% with sniff suggests a normal RA pressure of 3 mmHg.  Pericardium No pericardial effusion is present.  Compared to Previous Study This study was compared with the study from 19 . No significant changes noted.  Attestation I have personally viewed the imaging and agree with the interpretation and report as documented by the fellow, Lei Mckenna, and/or edited by me. ______________________________________________________________________________ MMode/2D Measurements & Calculations IVSd: 0.85 cm LVIDd: 4.3 cm LVIDs: 2.8 cm LVPWd: 0.77 cm FS: 35.9 % LV mass(C)d: 107.1 grams LV mass(C)dI: 50.3 grams/m2 Ao root diam: 3.0 cm LA dimension: 4.2 cm asc Aorta Diam: 3.3 cm LA/Ao: 1.4 LVOT diam: 2.0 cm LVOT area: 3.2 cm2 Ao root diam index Ht(cm/m): 1.8 Ao root diam index BSA (cm/m2): 1.4 Asc Ao diam index BSA (cm/m2): 1.6 Asc Ao diam index Ht(cm/m): 2.0 LA Volume (BP): 51.8 ml  LA Volume Index (BP): 24.3 ml/m2 RV Base: 3.3 cm RWT: 0.36 TAPSE: 2.0 cm  Doppler Measurements & Calculations MV E max marley: 69.0 cm/sec MV A max marley: 98.1 cm/sec MV E/A: 0.70 MV max P.1 mmHg MV mean P.1 mmHg MV V2 VTI: 25.8 cm MVA(VTI): 2.2 cm2 MV dec time: 0.30 sec Ao V2 max: 128.6 cm/sec Ao max P.6 mmHg Ao V2 mean: 94.6 cm/sec Ao mean PG: 3.9 mmHg Ao V2  VTI: 25.7 cm FLACA(I,D): 2.2 cm2 FLACA(V,D): 2.2 cm2 AI P1/2t: 398.5 msec LV V1 max PG: 3.1 mmHg LV V1 max: 88.4 cm/sec LV V1 VTI: 17.4 cm  SV(LVOT): 55.7 ml SI(LVOT): 26.2 ml/m2 PA V2 max: 93.8 cm/sec PA max PG: 3.5 mmHg AV Roger Ratio (DI): 0.69 FLACA Index (cm2/m2): 1.0 E/E' av.4 Lateral E/e': 9.8 Medial E/e': 13.0 RV S Roger: 10.6 cm/sec  ______________________________________________________________________________ Report approved by: Russ Desouza 10/05/2023 12:22 PM       MA Contrast Enhanced Mammogram w Jovi    Result Date: 2023  Examination: Dual-energy contrast-enhanced mammography Comparison: Contrast mammogram 2022 History: Short-term follow-up benign left breast biopsy showing fat necrosis. Contrast: 103 cc Isovue-370 intravenous contrast Technique: Following the administration of intravenous contrast, dual-energy mammography of both breasts is performed. Findings: Stable lumpectomy scar in the left breast. No new or suspicious mammographic FINDINGS. No suspicious areas of enhancement in either breast. BREAST DENSITY: Heterogeneously dense.     IMPRESSION: BI-RADS CATEGORY: 2 - Benign. RECOMMENDED FOLLOW-UP: Annual Mammography. The patient was given the results of the examination. PAT DE LEON MD   SYSTEM ID:  [id number]     Echocardiogram Complete    Result Date: 10/5/2023  [id number]^SOPHIA^MINO^FLAKITO  [facility] Diagnostic and Treatment-3rd Floor [address] [place] [id number]  Name: ERIK BOSS MRN: [id number] : 1955 Study Date: 10/05/2023 11:11 AM Age: 68 yrs Gender: Female Patient Location: [facility] Reason For Study: Pulmonary HTN (H), Shortness of breath Ordering Physician: MINO CORTES Referring Physician: MINO CORTES Performed By: Brijesh Briceño  BSA: 2.1 m2 Height: 66 in Weight: 231 lb HR: 75 BP: 122/76 mmHg  ______________________________________________________________________________ Procedure Echocardiogram with two-dimensional, color and spectral Doppler performed. ______________________________________________________________________________ Interpretation Summary Global and regional left ventricular function is normal with an EF of 55-60% with normal size and thickness.  Global right ventricular function and size are normal.  Trace to mild aortic insufficiency is present.  No pericardial effusion is present.  This study was compared with the study from 8/13/19 . No significant changes noted.  ______________________________________________________________________________ Left Ventricle Left ventricular wall thickness is normal. Left ventricular size is normal. Global and regional left ventricular function is normal with an EF of 55-60%. Left ventricular diastolic function is not assessable.  Right Ventricle The right ventricle is normal size. Global right ventricular function is normal.  Atria Both atria appear normal.  Mitral Valve Mild to moderate mitral annular calcification is present. Mild mitral insufficiency is present.  Aortic Valve The aortic valve is tricuspid. Trace to mild aortic insufficiency is present.  Tricuspid Valve Trace tricuspid insufficiency is present. The peak velocity of the tricuspid regurgitant jet is not obtainable. Pulmonary artery systolic pressure cannot be assessed.  Pulmonic Valve The valve leaflets are not well visualized. On Doppler interrogation, there is no significant stenosis or regurgitation.  Vessels The aorta root is normal. The thoracic aorta is normal. Sinuses of Valsalva 3.0 cm. Ascending aorta 3.3 cm. IVC diameter <2.1 cm collapsing >50% with sniff suggests a normal RA pressure of 3 mmHg.  Pericardium No pericardial effusion is present.  Compared to Previous Study This study was compared with the study from 8/13/19 . No significant changes noted.  Attestation I have  personally viewed the imaging and agree with the interpretation and report as documented by the fellow, Lei Mckenna, and/or edited by me. ______________________________________________________________________________ MMode/2D Measurements & Calculations IVSd: 0.85 cm LVIDd: 4.3 cm LVIDs: 2.8 cm LVPWd: 0.77 cm FS: 35.9 % LV mass(C)d: 107.1 grams LV mass(C)dI: 50.3 grams/m2 Ao root diam: 3.0 cm LA dimension: 4.2 cm asc Aorta Diam: 3.3 cm LA/Ao: 1.4 LVOT diam: 2.0 cm LVOT area: 3.2 cm2 Ao root diam index Ht(cm/m): 1.8 Ao root diam index BSA (cm/m2): 1.4 Asc Ao diam index BSA (cm/m2): 1.6 Asc Ao diam index Ht(cm/m): 2.0 LA Volume (BP): 51.8 ml  LA Volume Index (BP): 24.3 ml/m2 RV Base: 3.3 cm RWT: 0.36 TAPSE: 2.0 cm  Doppler Measurements & Calculations MV E max roger: 69.0 cm/sec MV A max roger: 98.1 cm/sec MV E/A: 0.70 MV max P.1 mmHg MV mean P.1 mmHg MV V2 VTI: 25.8 cm MVA(VTI): 2.2 cm2 MV dec time: 0.30 sec Ao V2 max: 128.6 cm/sec Ao max P.6 mmHg Ao V2 mean: 94.6 cm/sec Ao mean PG: 3.9 mmHg Ao V2 VTI: 25.7 cm FLACA(I,D): 2.2 cm2 FLACA(V,D): 2.2 cm2 AI P1/2t: 398.5 msec LV V1 max PG: 3.1 mmHg LV V1 max: 88.4 cm/sec LV V1 VTI: 17.4 cm  SV(LVOT): 55.7 ml SI(LVOT): 26.2 ml/m2 PA V2 max: 93.8 cm/sec PA max PG: 3.5 mmHg AV Roger Ratio (DI): 0.69 FLACA Index (cm2/m2): 1.0 E/E' av.4 Lateral E/e': 9.8 Medial E/e': 13.0 RV S Roger: 10.6 cm/sec  ______________________________________________________________________________ Report approved by: Russ Desouza 10/05/2023 12:22 PM       MA Contrast Enhanced Mammogram w Jovi    Result Date: 2023  Examination: Dual-energy contrast-enhanced mammography Comparison: Contrast mammogram 2022 History: Short-term follow-up benign left breast biopsy showing fat necrosis. Contrast: 103 cc Isovue-370 intravenous contrast Technique: Following the administration of intravenous contrast, dual-energy mammography of both breasts is performed. Findings: Stable lumpectomy scar in  the left breast. No new or suspicious mammographic FINDINGS. No suspicious areas of enhancement in either breast. BREAST DENSITY: Heterogeneously dense.     IMPRESSION: BI-RADS CATEGORY: 2 - Benign. RECOMMENDED FOLLOW-UP: Annual Mammography. The patient was given the results of the examination. PAT DE LEON MD   SYSTEM ID:  [id number]         TREATMENT:  I have recommended extra-depth diabetic shoes with molded insoles.  The patient is to return to the clinic as needed.    Kaushik Tyler DPM  [facility] Foot & Ankle Surgery/Podiatry

## 2023-10-12 NOTE — PATIENT INSTRUCTIONS
Wait until after your foot surgery to get fitted for inserts and shoes.        If you need a copy of your records:    Requesting Your Medical Records    You do not need to use the authorization form if you want to use Tail to read your records online. Please visit the Tail login page for more information.    Release of Information Forms  To request the release of your private health records, please download and fill out the Authorization for Release of Protected Health Information forms @ [email or web address]    The instructions for how to complete the form are on page 2 of the form. You may call the phone numbers below for help completing or submitting your form. If representatives are assisting other callers, please listen carefully for recorded message options.    Please note: Staff at these numbers will not share your health information verbally.    Release of Information Pickup Location (by appointment only)    [address] [place] [id number]   [employer] [place] Phone: [phone]  M [employer] [place] Fax: [phone]    You also may email your request to [email or web address].      Here's how you can request records from Tail.    When you log into Tail, go to your menu.  Then click on the following:    -My Record    -Document Center    -Requested Records    -Click here to request a copy of your medical records          Podiatry Clinics that offer foot and toenail care  [facility]  [facility]  [phone]    Foot and Ankle Clinics, [facility]  [phone]    [facility]  [place] - Dr. Pride on Tuesdays  [phone]    [facility]  [place]  [phone]    [facility]  [facility]  [phone]    [facility]  [phone]    [facility]  [phone]      [facility]  *Nurse comes to your  home for nail care.  Cinda Garcia RN Foot Specialist  [phone]

## 2023-10-12 NOTE — PROGRESS NOTES
Clinic Care Coordination Contact  Clinic Care Coordination Contact  OUTREACH    Referral Information:  Referral Source: PCP         Chief Complaint   Patient presents with    Clinic Care Coordination - Initial        Universal Utilization:   Clinic Utilization  Difficulty keeping appointments:: No  Compliance Concerns: No  No-Show Concerns: No  No PCP office visit in Past Year: No  Utilization      No Show Count (past year)  18             ED Visits  0             Hospital Admissions  0                    Current as of: 10/11/2023 12:36 PM                Clinical Concerns:  Current Medical Concerns:    Patient is a 68 year old woman with a history of severe major depression, Type 2 diabetes mellitus without complication, pulmonary hypertension, chronic intractable pain, anxiety, COPD, OAS, psoriasis, HTN, morbid obesity, history of breast cancer, hemochromatosis, hypothyroidism, vitamin D deficiency, vitamin B12 deficiency, bipolar trauma.   Patient reported a history of chronic pain, although she denied any today during assessment. She said she has been working closely with Dr. Dubois regarding her pain concerns. She said she is wary of trying new pain medications. Patient has a follow up with Dr. Dubois on 10/18/23.   Patient recently decided to switch her care back to the [facility] for her primary care and will be seeing Dr. Constantino as her PCP. Previously she had been at [facility] in [place].   Patient reported she feels her diabetes in under good control. Her last A1C was 5.8 on 7/14/23.   Patient stated she is having some right foot pain. She stated she discussed this with her Dr. Constantino at recent virtual visit. She was prescribed allopurinol, but has not taking it yet. She said she is apprehensive to start any new medications. Patient has been working with [facility] to get orthotic inserts for her shoes. She stated she would like the Orthotics to be covered by  Medicare as she has limited income. She stated Dr. Constantino has agreed to help her complete the paperwork in hopes they will be covered.    Patient reported she is good about taking her medications as directed and is good about attending all of her medical appointments.   Current Behavioral Concerns:   Patient reported a history of anxiety, trauma and bipolar 2 disorder. She continues to see her therapist twice weekly, which she said has been very beneficial in keeping her mental health more stable. Patient also attending partial hospitalization program through [facility]. She stated this has been equally as beneficial to her. Patient is scheduled to meet with a new psychiatric provider to discuss psychiatric medications. She stated she is hopeful medications and or treatments can be found to assist her with keeping her mental health more stable. Goal around feeling that her mental health was more stable was established during assessment today per her request. Patient denied any suicidal ideation during today's phone visit.   Education Provided to patient: Discussed the importance of taking her medications as directed. Encouraged her to attend all appointments as scheduled. Discussed safety plan if she every felt unsafe or if she felt her mental health was unmanageable. Encouraged her to contact Care Coordination as needs or concerns arise.   Pain (GOAL):: No  Health Maintenance Reviewed: Due/Overdue   Health Maintenance Due   Topic Date Due    HF ACTION PLAN  Never done    DIABETIC FOOT EXAM  Never done    ADVANCE CARE PLANNING  Never done    COPD ACTION PLAN  Never done    ZOSTER IMMUNIZATION (1 of 2) Never done    HEPATITIS B IMMUNIZATION (1 of 3 - Risk 3-dose series) Never done    RSV VACCINE 60+ (1 - 1-dose 60+ series) Never done    MEDICARE ANNUAL WELLNESS VISIT  Never done    EYE EXAM  12/07/2021    INFLUENZA VACCINE (1) 09/01/2023    COVID-19 Vaccine (7 - 2023-24 season) 09/08/2023    A1C  10/14/2023         Medication Management:  Medication review status: Medications reviewed and no changes reported per patient.        Patient manages her medications independently. She stated she is always compliant with her medications.      Functional Status:  Dependent ADLs:: Ambulation-cane, Bathing  Dependent IADLs:: Shopping, Cleaning, Laundry, Transportation  Bed or wheelchair confined:: No  Mobility Status: Independent w/Device  Fallen 2 or more times in the past year?: No  Any fall with injury in the past year?: No    Living Situation:  Current living arrangement:: I live in a private home with spouse  Type [facility]    Lifestyle & Psychosocial Needs:    Social Determinants of Health     Food Insecurity: Low Risk  (9/21/2023)    Food Insecurity     Within the past 12 months, did you worry that your food would run out before you got money to buy more?: No     Within the past 12 months, did the food you bought just not last and you didn t have money to get more?: No   Depression: At risk (10/9/2023)    PHQ-2     PHQ-2 Score: 5   Housing Stability: High Risk (9/21/2023)    Housing Stability     Do you have housing? : No     Are you worried about losing your housing?: Yes   Tobacco Use: Medium Risk (10/11/2023)    Patient History     Smoking Tobacco Use: Former     Smokeless Tobacco Use: Never     Passive Exposure: Not on file   Financial Resource Strain: Low Risk  (9/21/2023)    Financial Resource Strain     Within the past 12 months, have you or your family members you live with been unable to get utilities (heat, electricity) when it was really needed?: No   Alcohol Use: Not on file   Transportation Needs: High Risk (9/21/2023)    Transportation Needs     Within the past 12 months, has lack of transportation kept you from medical appointments, getting your medicines, non-medical meetings or appointments, work, or from getting things that you need?: Yes   Physical Activity: Not on file    Interpersonal Safety: Low Risk  (9/25/2023)    Interpersonal Safety     Do you feel physically and emotionally safe where you currently live?: Yes     Within the past 12 months, have you been hit, slapped, kicked or otherwise physically hurt by someone?: No     Within the past 12 months, have you been humiliated or emotionally abused in other ways by your partner or ex-partner?: No   Stress: Not on file   Social Connections: Not on file     Diet:: Regular  Inadequate nutrition (GOAL):: No  Tube Feeding: No  Inadequate activity/exercise (GOAL):: Yes  Significant changes in sleep pattern (GOAL): No  Transportation means:: Family, Regular car     Yarsani or spiritual beliefs that impact treatment:: No  Mental health DX:: Yes  Mental health DX how managed:: Medication, Outpatient Counseling, Psychiatrist, Day Treatment  Mental health management concern (GOAL):: No  Informal Support system:: Family, Friends, Spouse        Financial Concerns: Patient denied any financial concerns presently.      Resources and Interventions:  Current Resources:      Community Resources: None  Supplies Currently Used at Home: None  Equipment Currently Used at Home: grab bar, tub/shower, grab bar, toilet, shower chair, raised toilet seat  Employment Status: disabled         Advance Care Plan/Directive  Advanced Care Plans/Directives on file:: No  Advanced Care Plan/Directive Status: Declined Further Information    Referrals Placed: None         Care Plan:  Care Plan: Mental Health       Problem: Mental Health Symptoms Need Improvement       Goal: I would like to feel as though my mental health has improved.       Start Date: 9/25/2023 Expected End Date: 9/24/2024    This Visit's Progress: 10%    Priority: High    Note:     Barriers: history of anxiety, bipolar disorder, trauma related disorder  Strengths: strong advocate for herself  Patient expressed understanding of goal: yes  Action steps to achieve this goal:  1. I will continue to  attend all appointments with my therapist Mary Kay Gomez and with my new psychiatric provide.   2. I will continue to attend all appointments with the [facility] partial-hospitalization program.   3. I will continue to the use the skills I have learned through therapy and the partial hospitalization program.   4. I will take my medications as directed.   5. I will report progress towards this goal at schedule outreach telephone calls from my Care Coordinator.   3. I will                                Patient/Caregiver understanding: Verbalized understanding of goals and other information discussed at today's assessment.     Outreach Frequency: 2 months  Future Appointments                Today Kaushik Huffman DPM M [facility], [facility]    In 4 days SPMW CCC SW M [facility], [facility]    In 4 days B Mary Kay Weir LICSW [facility]    In 5 days Ekaterina Horton RD [facility]    In 6 days Dusty Dubois MD [facility] [place], [facility]W    In 6 days B Mary Kay Weir LICSW M [facility]    In 1 week Aminata Arteaga MD [facility], [facility] S[facility]    In 2 weeks Felicia Hicks, PharmD [facility]    In 2 months Deborah Barba LICSW [facility]    In 2 months B Mary Kay Weir LICSW [facility]    In 2 months B Mary Kay Weir LICSW [facility]    In 2 months B Mary Kay Weir LICSW [facility]    In 2 months Amy Hughes Critical access hospital [facility]    In 2 months B  Mary Kay Weir LICSW [facility]    In 2 months Alee Coker MD [facility] End[facility]inology [facility]    In 2 months B Mary Kay Weir LICSW [facility]    In 2 months B Mary Kay Weir LICSW [facility]    In 2 months Laith Constantino [facility]    In 3 months B Mary Kay Weir LICSW [facility]    In 3 months B Mary Kay Weir LICSW [facility] Addiction [facility]    In 3 months B Mary Kay Weir LICSW [facility]    In 3 months B Mary Kay Weir LICSW [facility] Addiction [facility]    In 3 months [facility]    In 3 months Tova Pablo MD [facility]    In 3 months B Mary Kay Weir LICSW [facility] Addiction [facility]    In 3 months B Mary Kay Weir LICSW [facility] Addiction [facility]    In 3 months Arnal[facility] Varela [facility]    In 3 months B Mary Kay Weir LICSW [facility]            Plan: CCC RN will continue to monitor, support patient with current goal and will be available to assist as nursing needs arise.

## 2023-10-16 ENCOUNTER — TELEPHONE (OUTPATIENT)
Dept: PULMONOLOGY | Facility: OTHER | Age: 68
End: 2023-10-16

## 2023-10-16 ENCOUNTER — PATIENT OUTREACH (OUTPATIENT)
Dept: CARE COORDINATION | Facility: CLINIC | Age: 68
End: 2023-10-16

## 2023-10-16 ENCOUNTER — VIRTUAL VISIT (OUTPATIENT)
Dept: PSYCHOLOGY | Facility: CLINIC | Age: 68
End: 2023-10-16
Payer: MEDICARE

## 2023-10-16 DIAGNOSIS — F41.1 GENERALIZED ANXIETY DISORDER: ICD-10-CM

## 2023-10-16 DIAGNOSIS — J44.9 CHRONIC OBSTRUCTIVE PULMONARY DISEASE, UNSPECIFIED COPD TYPE (H): ICD-10-CM

## 2023-10-16 DIAGNOSIS — F31.81 BIPOLAR 2 DISORDER (H): Primary | ICD-10-CM

## 2023-10-16 DIAGNOSIS — F43.9 TRAUMA AND STRESSOR-RELATED DISORDER: ICD-10-CM

## 2023-10-16 PROCEDURE — 90837 PSYTX W PT 60 MINUTES: CPT | Mod: VID | Performed by: SOCIAL WORKER

## 2023-10-16 ASSESSMENT — PAIN SCALES - PAIN ENJOYMENT GENERAL ACTIVITY SCALE (PEG)
AVG_PAIN_PASTWEEK: 7
INTERFERED_ENJOYMENT_LIFE: 9
INTERFERED_GENERAL_ACTIVITY: 6
PEG_TOTALSCORE: 7.33

## 2023-10-16 ASSESSMENT — PATIENT HEALTH QUESTIONNAIRE - PHQ9
SUM OF ALL RESPONSES TO PHQ QUESTIONS 1-9: 12
SUM OF ALL RESPONSES TO PHQ QUESTIONS 1-9: 12
10. IF YOU CHECKED OFF ANY PROBLEMS, HOW DIFFICULT HAVE THESE PROBLEMS MADE IT FOR YOU TO DO YOUR WORK, TAKE CARE OF THINGS AT HOME, OR GET ALONG WITH OTHER PEOPLE: VERY DIFFICULT

## 2023-10-16 NOTE — PROGRESS NOTES
Follow Up Progress Note      Assessment: CCC RN spoke with patient today as requested. Patient said she has stopped taking the topiramate as she said it was causing her to have frequent urination. She stated she felt overwhelmed prior to a virtual appointment with her PCP last week. She stated she received a call from the Clinic to check her in for appointment with her PCP. She's stated she was in an other virtual appointment at the time and felt upset that she was interrupted. Explained to patient this was protocol for the Clinic to have the PCP's check in the patient prior to the appointment. She stated she wished the person checking her in would have apologized for interrupting her appointment. We discussed making sure there is plenty of time between her appointments in the future to ensure she is not interrupted. Patient reported she is working on coping skills to help her deal with situations like this.   Informed patient the paperwork for her orthotics was signed by her PCP and faxed to the orthotics [email or web address].   Patient stated she was grateful for the call today.     Care Gaps:    Health Maintenance Due   Topic Date Due    HF ACTION PLAN  Never done    DIABETIC FOOT EXAM  Never done    ADVANCE CARE PLANNING  Never done    COPD ACTION PLAN  Never done    ZOSTER IMMUNIZATION (1 of 2) Never done    HEPATITIS B IMMUNIZATION (1 of 3 - Risk 3-dose series) Never done    RSV VACCINE 60+ (1 - 1-dose 60+ series) Never done    MEDICARE ANNUAL WELLNESS VISIT  Never done    EYE EXAM  12/07/2021    INFLUENZA VACCINE (1) 09/01/2023    COVID-19 Vaccine (7 - 2023-24 season) 09/08/2023    A1C  10/14/2023    URINE DRUG SCREEN  11/16/2023    CONTROLLED SUBSTANCE AGREEMENT FOR CHRONIC PAIN MANAGEMENT  11/16/2023       Scheduled with PCP on 10/19/23      Care Plans  Care Plan: Mental Health       Problem: Mental Health Symptoms Need Improvement       Goal: I would like to feel as though my mental health has improved.       Start  Date: 9/25/2023 Expected End Date: 9/24/2024    Recent Progress: 10%    Priority: High    Note:     Barriers: history of anxiety, bipolar disorder, trauma related disorder  Strengths: strong advocate for herself  Patient expressed understanding of goal: yes  Action steps to achieve this goal:  1. I will continue to attend all appointments with my therapist Mary Kay Gomez and with my new psychiatric provide.   2. I will continue to attend all appointments with the [facility] partial-hospitalization program.   3. I will continue to the use the skills I have learned through therapy and the partial hospitalization program.   4. I will take my medications as directed.   5. I will report progress towards this goal at schedule outreach telephone calls from my Care Coordinator.   Disucssed on 10/16/23                              Intervention/Education provided during outreach: Discussed the importance of taking her medications as directed. Encouraged her to attend all upcoming appointments. Encouraged her to contact Care Coordination for any additional needs or concerns.               Plan: CCC RN will continue to monitor, support patient with current goal and will be available to assist as nursing needs arise.     Care Coordinator will follow up in one e month.

## 2023-10-16 NOTE — TELEPHONE ENCOUNTER
[facility] Call Center    Phone Message    May a detailed message be left on voicemail: yes     Reason for Call: Medication Refill Request    Has the patient contacted the pharmacy for the refill? Yes   Name of medication being requested: Trelegy inhaler  Provider who prescribed the medication:   Pharmacy: [id number] - [place], [place] - [address]   Date medication is needed: 10/16      Action Taken: Message routed to:  Other: [facility] Pulm     Travel Screening: Not Applicable

## 2023-10-17 ENCOUNTER — OFFICE VISIT (OUTPATIENT)
Dept: FAMILY MEDICINE | Facility: CLINIC | Age: 68
End: 2023-10-17
Payer: MEDICARE

## 2023-10-17 ENCOUNTER — LAB (OUTPATIENT)
Dept: LAB | Facility: CLINIC | Age: 68
End: 2023-10-17
Payer: MEDICARE

## 2023-10-17 DIAGNOSIS — Z71.3 DIETARY COUNSELING AND SURVEILLANCE: Primary | ICD-10-CM

## 2023-10-17 DIAGNOSIS — E11.9 TYPE 2 DIABETES MELLITUS WITHOUT COMPLICATION, WITHOUT LONG-TERM CURRENT USE OF INSULIN (H): Primary | ICD-10-CM

## 2023-10-17 DIAGNOSIS — E83.110 HEREDITARY HEMOCHROMATOSIS (H): ICD-10-CM

## 2023-10-17 DIAGNOSIS — L40.50 PSORIATIC ARTHROPATHY (H): ICD-10-CM

## 2023-10-17 DIAGNOSIS — E89.0 POSTABLATIVE HYPOTHYROIDISM: ICD-10-CM

## 2023-10-17 DIAGNOSIS — M10.9 URIC ACID ARTHROPATHY: ICD-10-CM

## 2023-10-17 LAB
ANION GAP SERPL CALCULATED.3IONS-SCNC: 14 MMOL/L (ref 7–15)
BASO+EOS+MONOS # BLD AUTO: ABNORMAL 10*3/UL
BASO+EOS+MONOS NFR BLD AUTO: ABNORMAL %
BASOPHILS # BLD AUTO: 0 10E3/UL (ref 0–0.2)
BASOPHILS NFR BLD AUTO: 0 %
BUN SERPL-MCNC: 11.4 MG/DL (ref 8–23)
CALCIUM SERPL-MCNC: 9.6 MG/DL (ref 8.8–10.2)
CHLORIDE SERPL-SCNC: 104 MMOL/L (ref 98–107)
CREAT SERPL-MCNC: 0.67 MG/DL (ref 0.51–0.95)
CRP SERPL-MCNC: <3 MG/L
DEPRECATED HCO3 PLAS-SCNC: 24 MMOL/L (ref 22–29)
EGFRCR SERPLBLD CKD-EPI 2021: >90 ML/MIN/1.73M2
EOSINOPHIL # BLD AUTO: 0.1 10E3/UL (ref 0–0.7)
EOSINOPHIL NFR BLD AUTO: 1 %
ERYTHROCYTE [DISTWIDTH] IN BLOOD BY AUTOMATED COUNT: 12.5 % (ref 10–15)
ERYTHROCYTE [SEDIMENTATION RATE] IN BLOOD BY WESTERGREN METHOD: 8 MM/HR (ref 0–30)
GLUCOSE SERPL-MCNC: 111 MG/DL (ref 70–99)
HBA1C MFR BLD: 5.7 % (ref 0–5.6)
HCT VFR BLD AUTO: 52.6 % (ref 35–47)
HGB BLD-MCNC: 18.3 G/DL (ref 11.7–15.7)
IMM GRANULOCYTES # BLD: 0 10E3/UL
IMM GRANULOCYTES NFR BLD: 1 %
LYMPHOCYTES # BLD AUTO: 1.2 10E3/UL (ref 0.8–5.3)
LYMPHOCYTES NFR BLD AUTO: 15 %
MCH RBC QN AUTO: 33.2 PG (ref 26.5–33)
MCHC RBC AUTO-ENTMCNC: 34.8 G/DL (ref 31.5–36.5)
MCV RBC AUTO: 95 FL (ref 78–100)
MONOCYTES # BLD AUTO: 0.6 10E3/UL (ref 0–1.3)
MONOCYTES NFR BLD AUTO: 8 %
NEUTROPHILS # BLD AUTO: 6.1 10E3/UL (ref 1.6–8.3)
NEUTROPHILS NFR BLD AUTO: 75 %
PLATELET # BLD AUTO: 178 10E3/UL (ref 150–450)
POTASSIUM SERPL-SCNC: 4.1 MMOL/L (ref 3.4–5.3)
RBC # BLD AUTO: 5.52 10E6/UL (ref 3.8–5.2)
SODIUM SERPL-SCNC: 142 MMOL/L (ref 135–145)
TSH SERPL DL<=0.005 MIU/L-ACNC: 0.24 UIU/ML (ref 0.3–4.2)
URATE SERPL-MCNC: 3.4 MG/DL (ref 2.4–5.7)
WBC # BLD AUTO: 8.1 10E3/UL (ref 4–11)

## 2023-10-17 PROCEDURE — 86140 C-REACTIVE PROTEIN: CPT

## 2023-10-17 PROCEDURE — 85025 COMPLETE CBC W/AUTO DIFF WBC: CPT

## 2023-10-17 PROCEDURE — 84443 ASSAY THYROID STIM HORMONE: CPT

## 2023-10-17 PROCEDURE — 84550 ASSAY OF BLOOD/URIC ACID: CPT

## 2023-10-17 PROCEDURE — 83036 HEMOGLOBIN GLYCOSYLATED A1C: CPT

## 2023-10-17 PROCEDURE — 85652 RBC SED RATE AUTOMATED: CPT

## 2023-10-17 PROCEDURE — 36415 COLL VENOUS BLD VENIPUNCTURE: CPT

## 2023-10-17 PROCEDURE — 80048 BASIC METABOLIC PNL TOTAL CA: CPT

## 2023-10-17 NOTE — PROGRESS NOTES
"PATIENT HISTORY:   Ms. Randi Cleary returns for her nutrition visit to the lifestyle medicine weight management program. Previous measurements have been as follows:      Wt Readings from Last 5 Encounters:   09/28/23 104.8 kg (231 lb)   09/25/23 103.9 kg (229 lb)   09/25/23 104 kg (229 lb 4.8 oz)   08/31/23 103.9 kg (229 lb)   08/21/23 103.4 kg (228 lb)     Reports wt today 221.2 lb    Initial goal is to achieve a 5% weight loss of 11-12 lbs (i.e. A body weight of 216-217 lbs) within the next 3-4 months.        Since her last visit, she reports doing okay. Had started topamax but now stopped (after 3 days) due to urinary frequency and incontinence, increased anxiety and general wariness about starting another medication. On that note, we did also talk about pros/cons of weight loss medications. Winnie is not interested at this point.     Was seen by podiatry who reported no diabetes related foot changes. She did just have her A1c checked earlier today, we discussed the result as essentially stable from previous.     DIETARY HISTORY:   Has drastically reduced bread intake  Avoiding high purine foods      PHYSICAL ACTIVITY HISTORY:  Now, activity is very limited due to RIGHT ankle injuries, low back pain. Her activity is going up and down stairs in her house to do laundry or getting out of the house to go to appointments. Also, feeds the cat. Has trouble lifting because of her shoulders. Has a stationary bicycle at home, not really able to use at this point (sounds like a bike on a )  Long term goal is to walk in the pool     Social: .  is  and may work very long 12 hour days.       OBJECTIVE:   Estimated body mass index is 37.3 kg/m  as calculated from the following:    Height as of 8/15/23: 1.676 m (5' 5.98\").    Weight as of 9/28/23: 104.8 kg (231 lb).        Lab Results   Component Value Date    A1C 5.7 10/17/2023    A1C 5.8 07/14/2023    A1C 5.6 01/18/2023    A1C 5.6 " 08/31/2022    A1C 5.7 06/07/2022    A1C 6.1 10/30/2020    A1C 7.2 02/12/2020    A1C 6.0 10/03/2019         IMPRESSION:   Winnie is a 68 year old with obesity who is hoping to lose weight in order to improve her health  Weight has been trending down based on her reported weight today  A1c stable from previous in pre-diabetes range      DIETARY ASSESSMENT/PLAN:   Continue focus on eating on a regular basis, with focus on protein, vegetable and fruit  On salads - eggs, cottage cheese, fish, chicken, garbanzo beans  If having trouble eating produce in time, consider freezing and using in smoothies     Miscellaneous Issues:   Long term goal is to get back to the pool     Follow-up:   Monday November 20 at 3:40 pm via virtual    Patient referred by Laith Constantino MD   Total time spent with patient 40 minutes    Ekaterina Horton RD

## 2023-10-18 ENCOUNTER — VIRTUAL VISIT (OUTPATIENT)
Dept: PSYCHOLOGY | Facility: CLINIC | Age: 68
End: 2023-10-18
Payer: MEDICARE

## 2023-10-18 ENCOUNTER — TRANSFERRED RECORDS (OUTPATIENT)
Dept: HEALTH INFORMATION MANAGEMENT | Facility: CLINIC | Age: 68
End: 2023-10-18

## 2023-10-18 ENCOUNTER — OFFICE VISIT (OUTPATIENT)
Dept: PALLIATIVE MEDICINE | Facility: OTHER | Age: 68
End: 2023-10-18
Attending: ANESTHESIOLOGY
Payer: MEDICARE

## 2023-10-18 VITALS
HEART RATE: 96 BPM | WEIGHT: 221 LBS | DIASTOLIC BLOOD PRESSURE: 86 MMHG | OXYGEN SATURATION: 95 % | BODY MASS INDEX: 35.69 KG/M2 | SYSTOLIC BLOOD PRESSURE: 140 MMHG

## 2023-10-18 DIAGNOSIS — F31.81 BIPOLAR 2 DISORDER (H): Primary | ICD-10-CM

## 2023-10-18 DIAGNOSIS — F41.1 GENERALIZED ANXIETY DISORDER: ICD-10-CM

## 2023-10-18 DIAGNOSIS — F43.9 TRAUMA AND STRESSOR-RELATED DISORDER: ICD-10-CM

## 2023-10-18 DIAGNOSIS — M19.071 OSTEOARTHRITIS OF RIGHT ANKLE AND FOOT: ICD-10-CM

## 2023-10-18 DIAGNOSIS — Z79.891 LONG TERM (CURRENT) USE OF OPIATE ANALGESIC: Primary | ICD-10-CM

## 2023-10-18 LAB
CANNABINOIDS UR QL SCN: ABNORMAL
CREAT UR-MCNC: 79 MG/DL
ETHANOL UR QL SCN: NORMAL

## 2023-10-18 PROCEDURE — 80307 DRUG TEST PRSMV CHEM ANLYZR: CPT | Performed by: ANESTHESIOLOGY

## 2023-10-18 PROCEDURE — 80355 GABAPENTIN NON-BLOOD: CPT | Performed by: ANESTHESIOLOGY

## 2023-10-18 PROCEDURE — 99214 OFFICE O/P EST MOD 30 MIN: CPT | Performed by: ANESTHESIOLOGY

## 2023-10-18 PROCEDURE — 90837 PSYTX W PT 60 MINUTES: CPT | Mod: VID | Performed by: SOCIAL WORKER

## 2023-10-18 PROCEDURE — 80354 DRUG SCREENING FENTANYL: CPT | Performed by: ANESTHESIOLOGY

## 2023-10-18 PROCEDURE — G0463 HOSPITAL OUTPT CLINIC VISIT: HCPCS | Performed by: ANESTHESIOLOGY

## 2023-10-18 RX ORDER — OXYCODONE HYDROCHLORIDE 5 MG/1
5 TABLET ORAL EVERY 6 HOURS PRN
Qty: 56 TABLET | Refills: 0 | Status: SHIPPED | OUTPATIENT
Start: 2023-10-18 | End: 2023-11-10

## 2023-10-18 ASSESSMENT — PAIN SCALES - GENERAL: PAINLEVEL: SEVERE PAIN (7)

## 2023-10-18 NOTE — PATIENT INSTRUCTIONS
[facility] Number:  [phone]  Call with any questions about your care and for scheduling assistance.   Calls are returned Monday through Friday between 8 AM and 4:30 PM. We usually get back to you within 2 business days depending on the issue/request.    If we are prescribing your medications:  For opioid medication refills, call the clinic or send a [employer] message 7 days in advance.  Please include:  Name of requested medication  Name of the pharmacy.  For non-opioid medications, call your pharmacy directly to request a refill. Please allow 3-4 days to be processed.   Per [place] State Law:  All controlled substance prescriptions must be filled within 30 days of being written.    For those controlled substances allowing refills, pickup must occur within 30 days of last fill.      We believe regular attendance is key to your success in our program!    Any time you are unable to keep your appointment we ask that you call us at least 24 hours in advance to cancel.This will allow us to offer the appointment time to another patient.   Multiple missed appointments may lead to dismissal from the clinic.     PLAN:    You are scheduled for ankle  surgery next week.  Your surgeons will manage pain through the postoperative period.  They may collaborate with Dr. Dubois as needed.    Reviewed you are going to be involved in the interventional program through the [facility] health program.  Dr. Dubois to collaborate as needed.    Continue with the medical cannabis program.    Continue with the Requip 2 mg 3 times a day.    Follow-up with Dr. Dubois in 3 months

## 2023-10-18 NOTE — PROGRESS NOTES
Patient presents to the clinic today for a visit  with AXEL WIGGINS MD            4/11/2023     8:01 AM 5/30/2023    12:56 PM 8/31/2023     8:53 AM   PEG Score   PEG Total Score 8 10 9.33       UDS/CSA-10/18/23    Medications-  Oxycodone     QUESTIONS:    Maryan Temple MA  [facility] [place]

## 2023-10-18 NOTE — PROGRESS NOTES
[facility] - Office Visit    ASSESSMENT & PLAN     Winnie was seen today for pain and pain management.    Diagnoses and all orders for this visit:    Long term (current) use of opiate analgesic  -     Drug Confirmation Panel Urine with Creatinine; Future  -     Ethanol urine; Future  -     Ethanol urine  -     Drug Confirmation Panel Urine with Creatinine    Osteoarthritis of right ankle and foot  -     oxyCODONE (ROXICODONE) 5 MG tablet; Take 1 tablet (5 mg) by mouth every 6 hours as needed for pain (chronic pain) Dispense 10/30 for 10/31        Patient Instructions     [facility]    Clinic Number:  [phone]  Call with any questions about your care and for scheduling assistance.   Calls are returned Monday through Friday between 8 AM and 4:30 PM. We usually get back to you within 2 business days depending on the issue/request.    If we are prescribing your medications:  For opioid medication refills, call the clinic or send a Affibody message 7 days in advance.  Please include:  Name of requested medication  Name of the pharmacy.  For non-opioid medications, call your pharmacy directly to request a refill. Please allow 3-4 days to be processed.   Per [place] State Law:  All controlled substance prescriptions must be filled within 30 days of being written.    For those controlled substances allowing refills, pickup must occur within 30 days of last fill.      We believe regular attendance is key to your success in our program!    Any time you are unable to keep your appointment we ask that you call us at least 24 hours in advance to cancel.This will allow us to offer the appointment time to another patient.   Multiple missed appointments may lead to dismissal from the clinic.     PLAN:    You are scheduled for ankle  surgery next week.  Your surgeons will manage pain through the postoperative period.  They may collaborate with Dr. Dubois as  "needed.    Reviewed you are going to be involved in the interventional program through the [place] [facility] program.  Dr. Wiggins to collaborate as needed.    Continue with the medical cannabis program.    Continue with the Requip 2 mg 3 times a day.    Follow-up with Dr. Wiggins in 3 months      -----  AXEL WIGGINS MD  [facility]       SUBJECTIVE      Randi Cleary is a 68 year old year old female who presents to clinic today for the following:     Follow-up for arthralgias, cervical radiculopathy, mood disorder.    Reviewing the record has been seen in the weight management clinic.  Did not tolerate a trial of Topamax.  Has made changes in diet and the uric acid is decreased.    Seen in podiatry 10/12.    Seen by primary care provider, referral made to woman's group.    She describes was \"really well\" for a while.\"  Everything landed on me\".  She becomes tearful that she reviews some financial matters regarding her 's work, not being informed that he was eligible for unemployment over the summer and now cannot applied retroactively.  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    Then describes trying to have a virtual visit with her psychotherapist, and was scheduled for a virtual visit with her primary care provider after that, she Been interrupted during that appointment to remove this and notes he is \"easily frustrated.    Did review that with the Topamax, cause increased urinary frequency and is on a diuretic.    Was seen in podiatry, felt stable in terms of other factors and is proceeding next week for her ankle surgery.    She reviews with her laboratories concerned there was an elevated glucose and did have some Diet Coke.  We reviewed conservative aspartame (inflammatory and neuropathic.    She has been referred to intervention group through her primary care provider which will include [facility] pharmacist and " psychiatric visit.  Reviewed that indeed could be helpful that she still struggles with mental health.    Has been using medical cannabis products, the flower seems to help with anxiety and panic close mood.  She may use it 4-5 times a day helpful.    Has gotten a paraffin wax kit to dip her hands and for the arthritis.    Using the oxycodone 5 mg 4 times a day which she feels has been helpful.  She is aware that the surgeons will manage her ankle surgery in the global.  And can collaborate as needed.  History of drug test in November so will be obtained today.  MP reviewed    Current Outpatient Medications:     albuterol (PROVENTIL) (2.5 MG/3ML) 0.083% neb solution, Take 1 vial (2.5 mg) by nebulization every 4 hours as needed for shortness of breath / dyspnea or wheezing, Disp: 360 mL, Rfl: 5    albuterol (VENTOLIN HFA) 108 (90 Base) MCG/ACT inhaler, Inhale 2 puffs into the lungs every 6 hours, Disp: 18 g, Rfl: 11    allopurinol (ZYLOPRIM) 300 MG tablet, Take 1 tablet (300 mg) by mouth daily, Disp: 30 tablet, Rfl: 11    benzonatate (TESSALON) 200 MG capsule, Take 1 capsule (200 mg) by mouth 3 times daily as needed for cough, Disp: 30 capsule, Rfl: 1    Cholecalciferol (VITAMIN D3) 250 MCG (35052 UT) TABS, Take 1 tablet by mouth daily 74933/day, Disp: , Rfl:     Cyanocobalamin (VITAMIN B-12) 5000 MCG SUBL, Place 2-3 sprays under the tongue daily Unknown dose. 2 or 3 sprays/day, Disp: , Rfl:     ethacrynic acid (EDECRIN) 25 MG tablet, Take 1 tablet (25 mg) by mouth every other day, Disp: 45 tablet, Rfl: 3    Fluticasone-Umeclidin-Vilanterol (TRELEGY ELLIPTA) 200-62.5-25 MCG/ACT oral inhaler, Inhale 1 puff into the lungs daily OFFICE VISIT NEEDED FOR ANY FURTHER REFILLS, Disp: 1 each, Rfl: 0    KLOR-CON 20 MEQ CR tablet, Take 1 tablet (20 mEq total) by mouth 2 (two) times a day., Disp: 180 tablet, Rfl: 3    magnesium 250 MG tablet, Take 1 tablet by mouth 2 times daily, Disp: , Rfl:     medical cannabis (Patient's own  "supply), See Admin Instructions (The purpose of this order is to document that the patient reports taking medical cannabis.  This is not a prescription, and is not used to certify that the patient has a qualifying medical condition.) Ranjana, Disp: , Rfl:     omeprazole (PRILOSEC) 20 MG DR capsule, Take 1 capsule (20 mg) by mouth daily, Disp: 90 capsule, Rfl: 3    oxyCODONE (ROXICODONE) 5 MG tablet, Take 1 tablet (5 mg) by mouth every 6 hours as needed for pain (chronic pain) Dispense 10/30 for 10/31, Disp: 56 tablet, Rfl: 0    rOPINIRole (REQUIP) 2 MG tablet, One tab 3 pm, one bedtime, and one during night on waking, Disp: 90 tablet, Rfl: 3    STATIN NOT PRESCRIBED (INTENTIONAL), Please choose reason not prescribed from choices below., Disp: , Rfl:     SYNTHROID 150 MCG tablet, Take 1 tablet (150 mcg) by mouth daily, Disp: 90 tablet, Rfl: 4    vitamin E 400 units TABS, Take 800 Units by mouth daily, Disp: , Rfl:     topiramate (TOPAMAX) 25 MG tablet, Take 1 tablet (25 mg) by mouth 2 times daily (Patient not taking: Reported on 10/18/2023), Disp: 60 tablet, Rfl: 11    Current Facility-Administered Medications:     cyanocobalamin injection 1,000 mcg, 1,000 mcg, Intramuscular, Q30 Days, Laith Constantino MD        Review of Systems   General, psych, musculoskeletal, bowels and bladder otherwise normal other than above.          OBJECTIVE   BP (!) 140/86   Pulse 96   Wt 100.2 kg (221 lb)   SpO2 95%   BMI 35.69 kg/m          Physical Exam  General: Alert, clear sensorium, no respiratory distress, no pain behavior  Cardiovascular: Normal rate  Lungs: Pulmonary effort is normal, speaking in full sentences  MSK: Wearing a splint on her left hand supporting the wrist, describes sometimes it can \"pop\" and is anticipating this may be the next surgical focus  Skin: Warm and dry. No concerning rashes or lesions.  Neurologic:  alert and oriented x3  Psychiatric: Initially tearful, then organized to self and smiling by the " end of the appointment.    Assessment, arthralgias, scheduled for an ankle surgery due to osteoarthritis.    Mood disorder, continues work with her psychotherapist.  Her primary care provider referred her to the [place] interventional program where she will see several disciplines.    Review happy to collaborate.  He has had a history of serotonin syndrome and been very cautious about medications provide spectrum disorder.  Has been relatively stable recently though easily decompensates with stressors as well as as above.    Total time 32 minutes with moderate complexity decision making      Total time spent:  minutes  [facility] - Office Visit    ASSESSMENT & PLAN     Winnie was seen today for pain and pain management.    Diagnoses and all orders for this visit:    Long term (current) use of opiate analgesic  -     Drug Confirmation Panel Urine with Creatinine; Future  -     Ethanol urine; Future  -     Ethanol urine  -     Drug Confirmation Panel Urine with Creatinine    Osteoarthritis of right ankle and foot  -     oxyCODONE (ROXICODONE) 5 MG tablet; Take 1 tablet (5 mg) by mouth every 6 hours as needed for pain (chronic pain) Dispense 10/30 for 10/31        Patient Instructions     [facility]    Clinic Number:  [phone]  Call with any questions about your care and for scheduling assistance.   Calls are returned Monday through Friday between 8 AM and 4:30 PM. We usually get back to you within 2 business days depending on the issue/request.    If we are prescribing your medications:  For opioid medication refills, call the clinic or send a TextDigger message 7 days in advance.  Please include:  Name of requested medication  Name of the pharmacy.  For non-opioid medications, call your pharmacy directly to request a refill. Please allow 3-4 days to be processed.   Per [place] State Law:  All controlled substance prescriptions must be filled within 30 days of  being written.    For those controlled substances allowing refills, pickup must occur within 30 days of last fill.      We believe regular attendance is key to your success in our program!    Any time you are unable to keep your appointment we ask that you call us at least 24 hours in advance to cancel.This will allow us to offer the appointment time to another patient.   Multiple missed appointments may lead to dismissal from the clinic.     PLAN:    You are scheduled for ankle  surgery next week.  Your surgeons will manage pain through the postoperative period.  They may collaborate with Dr. Wiggins as needed.    Reviewed you are going to be involved in the interventional program through the [facility] program.  Dr. Wiggins to collaborate as needed.    Continue with the medical cannabis program.    Continue with the Requip 2 mg 3 times a day.    Follow-up with Dr. Wiggins in 3 months      -----  AXEL WIGGINS MD  [facility]       SUBJECTIVE      Randi Cleary is a 68 year old year old female who presents to clinic today for the following:         Review of Systems   General, psych, musculoskeletal, bowels and bladder otherwise normal other than above.          OBJECTIVE   BP (!) 171/102   Pulse 106   Ht 1.829 m (6')   Wt 103.8 kg (228 lb 14.4 oz)   SpO2 97%   BMI 31.04 kg/m        Physical Exam  General:  Normal appearance, no apparent distress  Cardiovascular: Normal rate  Lungs: Pulmonary effort is normal, speaking in full sentences  MSK: normal muscle bulk and tone, ROM, equal strength in all extremities  Skin: Warm and dry. No concerning rashes or lesions.  Neurologic: No focal deficit, alert and oriented x3  Psychiatric: normal mood and affect, cooperative      Total time spent: minutes

## 2023-10-18 NOTE — PROGRESS NOTES
[facility]                                     Progress Note    Patient Name: Randi Cleary  Date: 10/18/23         Service Type: Individual     Session Start Time: 3:29 PM Session End Time: 4:24 PM     Session Length: 55 minutes    Session #: 208    Attendees: Client attended alone    Service Modality:  Video Visit:      Provider verified identity through the following two step process.  Patient provided:  Patient is known previously to provider    Telemedicine Visit: The patient's condition can be safely assessed and treated via synchronous audio and visual telemedicine encounter.      Reason for Telemedicine Visit: Patient convenience (e.g. access to timely appointments / distance to available provider)    Originating Site (Patient Location): Patient's home    Distant Site (Provider Location): Provider Remote Setting- Home Office    Consent:  The patient/guardian has verbally consented to: the potential risks and benefits of telemedicine (video visit) versus in person care; bill my insurance or make self-payment for services provided; and responsibility for payment of non-covered services.     Patient would like the video invitation sent by:  My Chart    Mode of Communication:  Video Conference via [facility]    Distant Location (Provider):  Off-site    As the provider I attest to compliance with applicable laws and regulations related to telemedicine.      DATA  Extended Session (53+ minutes): No  Interactive Complexity: No  Crisis: No        Progress Since Last Session (Related to Symptoms / Goals / Homework):   Symptoms:  Patient is hopeful with upcoming surgery.       Homework:  Progress made  Schedule shoulder surgery/see [facility] -not done  Schedule foot insert -done       Episode of Care Goals: Satisfactory progress - ACTION (Actively working towards change); Intervened by reinforcing change plan / affirming steps taken     Current / Ongoing Stressors and Concerns:   Patient is  currently socially isolated. She has a conflictual relationship with her .  She is getting minimal physical activity.  She has had several surgeries. Patient's car was just insured.     Treatment Objective(s) Addressed in This Session:   Patient will increase frequency of engaging her in ADLs.  Patient will track and record at least 5 pleasant exchanges with . Patient will be able to identify at least 5 positive traits about her .  Patient will reduce level of depressive and anxious features as evidenced by reduction in score on her CHAVO-7 and PHQ-9 (scores of 15 and 16 at first measurment, respectively).     Intervention:   Supportive Therapy:   Discussed her ongoing health concerns and the steps she is making to resolve them, focusing on her foot. Looked at her concerns with the upcoming surgery. Looked at some of her coping and how it's leading to stability in her mood at the moment.        The following assessments were completed by patient for this visit:  PHQ9:       9/5/2023     1:27 PM 9/13/2023    10:57 AM 9/18/2023     9:51 AM 9/20/2023    10:10 AM 9/27/2023     4:45 AM 10/9/2023     8:52 AM 10/16/2023     8:25 AM   PHQ-9 SCORE   PHQ-9 Total Score MyChart 19 (Moderately severe depression) 17 (Moderately severe depression) 18 (Moderately severe depression) 18 (Moderately severe depression) 17 (Moderately severe depression) 18 (Moderately severe depression) 12 (Moderate depression)   PHQ-9 Total Score 19 17 18 18 17    17 18 12     GAD7:       7/6/2023     9:11 AM 7/21/2023    10:34 AM 8/7/2023     1:03 PM 8/21/2023    11:07 AM 9/5/2023     1:30 PM 9/20/2023     9:59 AM 10/9/2023     8:55 AM   CHAVO-7 SCORE   Total Score 15 (severe anxiety) 18 (severe anxiety) 18 (severe anxiety) 13 (moderate anxiety) 15 (severe anxiety) 16 (severe anxiety) 15 (severe anxiety)   Total Score 15    15 18 18    18 13 15 16    16    16 15     PROMIS 10-Global Health (only subscores and total score):        6/8/2023    12:05 PM 6/22/2023    10:03 AM 7/6/2023     9:12 AM 7/21/2023    10:36 AM 7/28/2023     3:24 PM 8/7/2023     1:07 PM 9/20/2023    10:13 AM   PROMIS-10 Scores Only   Global Mental Health Score 6    6 5    5 5    5 5    5  6    6     5    5   Global Physical Health Score 9    9 7    7 8    8 7    7  8    8     9    9   PROMIS TOTAL - SUBSCORES 15    15 12    12 13    13 12    12  14    14     14    14       Information is confidential and restricted. Go to Review Flowsheets to unlock data.    Multiple values from one day are sorted in reverse-chronological order        ASSESSMENT: Current Emotional / Mental Status (status of significant symptoms):   Risk status (Self / Other harm or suicidal ideation)   Patient denies current fears or concerns for personal safety.   Patient denies current or recent suicidal ideation or behaviors.   Patient denies current or recent homicidal ideation or behaviors.   Patient denies current or recent self injurious behavior or ideation.   Patient denies other safety concerns.   Patient reports there has been no change in risk factors since their last session.     Patient reports there has been no change in protective factors since their last session.     A safety and risk management plan has been developed including: Patient consented to co-developed safety plan on 11/24/2020, updated 2/20/23.  Safety and risk management plan was reviewed.   Patient agreed to use safety plan should any safety concerns arise.  A copy was made available to the patient.     Appearance:   Appropriate    Eye Contact:   Good    Psychomotor Behavior: Normal    Attitude:   Cooperative    Orientation:   All   Speech    Rate / Production: Normal/ Responsive    Volume:  Normal    Mood:    Anxious    Affect:    Appropriate  Tearful   Thought Content:  Clear    Thought Form:  Coherent    Insight:    Good      Medication Review:   No changes to current psychiatric medication(s)     Medication  Compliance:   Yes     Changes in Health Issues:   None reported     Chemical Use Review:   Substance Use: Chemical use reviewed, no active concerns identified Nothing used since 2021.     Tobacco Use: No current tobacco use.      Diagnosis:  1. Bipolar 2 disorder (H)    2. Generalized anxiety disorder    3. Trauma and stressor-related disorder      Collateral Reports Completed:   Not Applicable    PLAN: (Patient Tasks / Therapist Tasks / Other)  Schedule shoulder surgery/see [facility]  Take care of yourself as you prep for foot surgery        There has been demonstrated improvement in functioning while patient has been engaged in psychotherapy/psychological service- if withdrawn the patient would deteriorate and/or relapse.     MICAELA SLADE, [facility]   2023                                                        ______________________________________________________________________    Individual Treatment Plan    Patient's Name: Randi Cleary  YOB: 1955    Date of Creation: 20  Date Treatment Plan Last Reviewed/Revised: 23    DSM5 Diagnoses: 296.89 Bipolar II Disorder Depressed, 300.02 (F41.1) Generalized Anxiety Disorder, or Adjustment Disorders  309.89 (F43.8) Other Specified Trauma and Stressor Related Disorder  Psychosocial / Contextual Factors: Patient's entire family of origin has , she now has a sister-in-law and  as support.  Relationship with  is conflictual. She is recovering from surgeries  PROMIS (reviewed every 90 days): PROMIS-10 Scores  PROMIS 10-Global Health (only subscores and total score):   PROMIS-10 Scores Only 2021 3/15/2022 3/15/2022 3/15/2022 3/24/2022 2022 2022   Global Mental Health Score 6 6 6 6 8 12 12   Global Physical Health Score 9 9 9 9 8 11 10   PROMIS TOTAL - SUBSCORES 15 15 15 15 16 23 22       Referral / Collaboration:  Referral to another professional/service is not indicated at this  "time..    Anticipated number of session for this episode of care: 50  Anticipation frequency of session: Biweekly  Anticipated Duration of each session: 53 or more minutes due to intensity of trauma symptoms  Treatment plan will be reviewed in 90 days or when goals have been changed.   There has been demonstrated improvement in functioning while patient has been engaged in psychotherapy/psychological service- if withdrawn the patient would deteriorate and/or relapse.       MeasurableTreatment Goal(s) related to diagnosis / functional impairment(s)  Goal 1: Patient will \"jumpstart, getting going with the things I need to be doing around the house as far as picking up, doing things, trying to do something every day.  Also to lessen the animosity between me and my .\"    I will know I've met my goal when my shoulders are fixed and I can see.      Objective #A (Patient Action)    Patient will  increase frequency of engaging her in ADLs .  Status: Continued - Date(s): 12/10/21, 3/9/22, 6/9/22, 9/2/22, 12/1/22, 3/2/23, 6/6/23, 9/13/23    Intervention(s)  Therapist will  engage patient in CBT, specifically behavioral activation .    Objective #B  Patient will   track and record at least 5 pleasant exchanges with . Patient will be able to identify at least 5 positive traits about her  and how he relates to her .  Status: Continued - Date(s): 12/10/21, 3/9/22, 6/9/22, 9/2/22, 12/1/22, 3/2/23, 6/6/23, 9/13/23    Intervention(s)  Therapist will  teach assertiveness skills and assign homework related to relationship interactions .    Objective #C  Patient will  reduce level of depressive and anxious features as evidenced by reduction in score on her CHAVO-7 and PHQ-9 (scores of 15 and 16 at first measurment, respectively) .  Status: Continued - Date(s): 12/10/21, 3/9/22, 6/9/22, 9/2/22, 12/1/22, 3/2/23, 6/6/23, 9/13/23    Intervention(s)  Therapist will  engage patient in person-centered therapy and CBT " ".    Patient has reviewed and agreed to the above plan.      CRUZ SAMUELMICAELA PAULINO JO, [facility]  September 13, 2023                                                   Randi Cleary          SAFETY PLAN:  Step 1: Warning signs / cues (Thoughts, images, mood, situation, behavior) that a crisis may be developing:  Thoughts: \"I don't want to continue\" \"I am unwanted\"  Images: none  Thinking Processes: ruminating  Mood: anger  Behaviors: isolating/withdrawing , can't stop crying, not taking care of myself and not taking care of my responsibilities  Situations: small triggers, such as not being able to find something, or dropping something   Step 2: Coping strategies - Things I can do to take my mind off of my problems without contacting another person (relaxation technique, physical activity):  Distress Tolerance Strategies:  arts and crafts: drawing, play with my pet , listen to positive and upbeat music: any, change body temperature (ice pack/cold water)  and paced breathing/progressive muscle relaxation  Physical Activities: go for a walk, deep breathing and stretching   Focus on helpful thoughts:  \"You've been through this before, you can get through it again.\"  Step 3: People and social settings that provide distraction:                 Name: Carmen                            Name: D[place]en                           Name: Aleida       pool, shopping, Carmen's house, [employer]       Step 4: Remind myself of people and things that are important to me and worth living for:  Clifford Little Donna, post-COVID world, options of what could be in your future        Step 5: When I am in crisis, I can ask these people to help me use my safety plan:                 Name: Sidney  Step 6: Making the environment safe:   go to sleep/daydream  Step 7: Professionals or agencies I can contact during a crisis:  [facility] Daytime Number: 763-931-8550  Suicide Prevention Lifeline: 5-322-644-YZLL (3096)  Crisis Text Line Service " (available 24 hours a day, 7 days a week): Text [place] to 941007  Local Crisis Services: [facility] Crisis: [phone]  Adults can always access to the emPATH unit at [facility] (no phone number, utilize it like an urgent care or ER where you just show up)     Call 911 or go to my nearest emergency department.       I helped develop this safety plan and agree to use it when needed.  I have been given a copy of this plan.       Client signature _________________________________________________________________  Today s date:  11/24/2020  Adapted from Safety Plan Template 2008 Randi Poole and Robby Barba is reprinted with the express permission of the authors.  No portion of the Safety Plan Template may be reproduced without the express, written permission.  You can contact the authors at [email or web address] or [email or web address].

## 2023-10-19 ENCOUNTER — OFFICE VISIT (OUTPATIENT)
Dept: INTERNAL MEDICINE | Facility: CLINIC | Age: 68
End: 2023-10-19
Payer: MEDICARE

## 2023-10-19 VITALS
RESPIRATION RATE: 16 BRPM | DIASTOLIC BLOOD PRESSURE: 70 MMHG | WEIGHT: 222.8 LBS | BODY MASS INDEX: 35.81 KG/M2 | SYSTOLIC BLOOD PRESSURE: 120 MMHG | HEART RATE: 77 BPM | TEMPERATURE: 98.7 F | HEIGHT: 66 IN | OXYGEN SATURATION: 96 %

## 2023-10-19 DIAGNOSIS — Z98.890 STATUS POST PHLEBECTOMY: ICD-10-CM

## 2023-10-19 DIAGNOSIS — J44.9 CHRONIC OBSTRUCTIVE PULMONARY DISEASE, UNSPECIFIED COPD TYPE (H): ICD-10-CM

## 2023-10-19 DIAGNOSIS — I10 ESSENTIAL HYPERTENSION: ICD-10-CM

## 2023-10-19 DIAGNOSIS — L40.50 PSORIATIC ARTHROPATHY (H): ICD-10-CM

## 2023-10-19 DIAGNOSIS — K42.9 UMBILICAL HERNIA WITHOUT OBSTRUCTION AND WITHOUT GANGRENE: ICD-10-CM

## 2023-10-19 DIAGNOSIS — R05.1 ACUTE COUGH: ICD-10-CM

## 2023-10-19 DIAGNOSIS — Z29.11 NEED FOR VACCINATION AGAINST RESPIRATORY SYNCYTIAL VIRUS: ICD-10-CM

## 2023-10-19 DIAGNOSIS — Z86.018 H/O PHEOCHROMOCYTOMA: ICD-10-CM

## 2023-10-19 DIAGNOSIS — Z23 NEED FOR SHINGLES VACCINE: ICD-10-CM

## 2023-10-19 DIAGNOSIS — E83.110 HEREDITARY HEMOCHROMATOSIS (H): ICD-10-CM

## 2023-10-19 DIAGNOSIS — K44.9 HIATAL HERNIA: ICD-10-CM

## 2023-10-19 DIAGNOSIS — E89.0 POSTABLATIVE HYPOTHYROIDISM: ICD-10-CM

## 2023-10-19 DIAGNOSIS — Z23 NEED FOR PROPHYLACTIC VACCINATION AGAINST HEPATITIS B VIRUS: ICD-10-CM

## 2023-10-19 DIAGNOSIS — Z01.818 PRE-OPERATIVE EXAMINATION FOR INTERNAL MEDICINE: ICD-10-CM

## 2023-10-19 DIAGNOSIS — F31.81 BIPOLAR 2 DISORDER (H): ICD-10-CM

## 2023-10-19 DIAGNOSIS — M21.969 DEFORMITY OF FOOT, UNSPECIFIED LATERALITY: ICD-10-CM

## 2023-10-19 DIAGNOSIS — E11.9 TYPE 2 DIABETES MELLITUS WITHOUT COMPLICATION, WITHOUT LONG-TERM CURRENT USE OF INSULIN (H): Primary | ICD-10-CM

## 2023-10-19 DIAGNOSIS — Z85.3 H/O MALIGNANT NEOPLASM OF FEMALE BREAST: ICD-10-CM

## 2023-10-19 PROCEDURE — G0008 ADMIN INFLUENZA VIRUS VAC: HCPCS | Performed by: INTERNAL MEDICINE

## 2023-10-19 PROCEDURE — 96372 THER/PROPH/DIAG INJ SC/IM: CPT | Performed by: INTERNAL MEDICINE

## 2023-10-19 PROCEDURE — 99214 OFFICE O/P EST MOD 30 MIN: CPT | Mod: 25 | Performed by: INTERNAL MEDICINE

## 2023-10-19 PROCEDURE — 90662 IIV NO PRSV INCREASED AG IM: CPT | Performed by: INTERNAL MEDICINE

## 2023-10-19 RX ORDER — FAMOTIDINE 40 MG/1
40 TABLET, FILM COATED ORAL DAILY
Qty: 90 TABLET | Refills: 3 | Status: SHIPPED | OUTPATIENT
Start: 2023-10-19 | End: 2023-11-16

## 2023-10-19 RX ORDER — RESPIRATORY SYNCYTIAL VIRUS VACCINE 120MCG/0.5
0.5 KIT INTRAMUSCULAR ONCE
Qty: 1 EACH | Refills: 0 | Status: SHIPPED | OUTPATIENT
Start: 2023-10-19 | End: 2023-10-19

## 2023-10-19 RX ORDER — BENZONATATE 200 MG/1
200 CAPSULE ORAL 3 TIMES DAILY PRN
Qty: 30 CAPSULE | Refills: 1 | Status: SHIPPED | OUTPATIENT
Start: 2023-10-19 | End: 2024-07-25

## 2023-10-19 RX ADMIN — CYANOCOBALAMIN 1000 MCG: 1000 INJECTION, SOLUTION INTRAMUSCULAR; SUBCUTANEOUS at 10:48

## 2023-10-19 NOTE — PATIENT INSTRUCTIONS
Recommend RSV vaccine due in the pharmacy  Recommend pepcid 40mg for the hiatal hernia    Baby aspirin after surgery to prevent clots    You can take synthroid with a sip of water on morning of surgery   Defer all other pills till after surgery   Nothing by mouth overnight and morning of surgery   Skip one thyroid pill a week and recheck TSH in 3 months ( can be done by other labs )

## 2023-10-19 NOTE — PROGRESS NOTES
[facility]  [address] [place] [address]  Phone: [phone]  Fax: [phone]  Primary Provider: Laith Constantino  Pre-op Performing Provider: HUGH ONTIVEROS      PREOPERATIVE EVALUATION:  Today's date: 10/19/2023    Winnie is a 68 year old female with a complex history but currently very stable in her health  who presents for a preoperative evaluation.      10/19/2023     9:31 AM   Additional Questions   Roomed by surjit   Accompanied by alone         10/19/2023     9:31 AM   Patient Reported Additional Medications   Patient reports taking the following new medications none       Surgical Information:  Surgery/Procedure: rt foot surgery  Surgery Location: [facility]  Surgeon: Dr Coughlin  Surgery Date: 10/23/23  Time of Surgery: tbd  Where patient plans to recover: At home with family  Fax number for surgical facility: [phone]    Assessment & Plan     The proposed surgical procedure is considered LOW risk.    Need for shingles vaccine      Need for prophylactic vaccination against hepatitis B virus      Need for vaccination against respiratory syncytial virus      Type 2 diabetes mellitus without complication, without long-term current use of insulin (H)  Lab Results   Component Value Date    A1C 5.7 10/17/2023    A1C 5.8 07/14/2023    A1C 5.6 01/18/2023    A1C 5.6 08/31/2022    A1C 5.7 06/07/2022    A1C 6.1 10/30/2020    A1C 7.2 02/12/2020    A1C 6.0 10/03/2019     Well controlled . She is not on baby apsirin . Can start after surgery for clot prevention and primary prevention of ASCVD     Essential hypertension  Well controlled     Psoriatic arthropathy (H)      Bipolar 2 disorder (H)  Stable     Postablative hypothyroidism  TSH   Date Value Ref Range Status   10/17/2023 0.24 (L) 0.30 - 4.20 uIU/mL Final   02/23/2022 1.14 0.30 - 5.00 uIU/mL Final   05/18/2021 1.31 0.40 - 4.00 mU/L Final    Recommend dose adjustment    Uses brand name synthroid which is expensive   So she can skip one day a week ( 150mcg) and get TSH with her next blood draws due for hematology     Hereditary hemochromatosis (H24)  Has polycythemia so needs periodic phlebotomy  Last hbg is 18 so no a contraindication for surgery but should take aspirn afterwards      Pre-operative examination for internal medicine  She has had multiple other surgeries with no prior complications   Normal cardiac stress and echo done in 2021         - No identified additional risk factors other than previously addressed    Antiplatelet or Anticoagulation Medication Instructions:   - Patient is on no antiplatelet or anticoagulation medications.    Additional Medication Instructions:  Only take synthroid with sips of water . All other meds   Take after surgery when she is allowed to drink water   RECOMMENDATION:  APPROVAL GIVEN to proceed with proposed procedure, without further diagnostic evaluation.    Recommend RSV vaccine due in the pharmacy  Recommend pepcid 40mg for the hiatal hernia    Baby aspirin after surgery to prevent clots    You can take synthroid with a sip of water on morning of surgery   Defer all other pills till after surgery   Nothing by mouth overnight and morning of surgery   Skip one thyroid pill a week and recheck TSH in 3 months ( can be done by other labs )         Subjective       HPI related to upcoming procedure: bone spurs and hammer toes , needs         10/16/2023    12:08 PM   Preop Questions   1. Have you ever had a heart attack or stroke? No   2. Have you ever had surgery on your heart or blood vessels, such as a stent placement, a coronary artery bypass, or surgery on an artery in your head, neck, heart, or legs? No   3. Do you have chest pain with activity? No   4. Do you have a history of  heart failure? No   5. Do you currently have a cold, bronchitis or symptoms of other infection? No   6. Do you have a cough, shortness of breath, or wheezing?  YES -    7. Do you or anyone in your family have previous history of blood clots? YES -    8. Do you or does anyone in your family have a serious bleeding problem such as prolonged bleeding following surgeries or cuts? No   9. Have you ever had problems with anemia or been told to take iron pills? No   10. Have you had any abnormal blood loss such as black, tarry or bloody stools, or abnormal vaginal bleeding? No   11. Have you ever had a blood transfusion? No   12. Are you willing to have a blood transfusion if it is medically needed before, during, or after your surgery? Yes   13. Have you or any of your relatives ever had problems with anesthesia? No   14. Do you have sleep apnea, excessive snoring or daytime drowsiness? UNKNOWN -    15. Do you have any artifical heart valves or other implanted medical devices like a pacemaker, defibrillator, or continuous glucose monitor? No   16. Do you have artificial joints? YES - ankle    17. Are you allergic to latex? No       Health Care Directive:  Patient does not have a Health Care Directive or Living Will: Discussed advance care planning with patient; however, patient declined at this time.    Preoperative Review of :            Review of Systems  Constitutional, neuro, ENT, endocrine, pulmonary, cardiac, gastrointestinal, genitourinary, musculoskeletal, integument and psychiatric systems are negative, except as otherwise noted.    Patient Active Problem List    Diagnosis Date Noted    Trauma and stressor-related disorder 06/29/2023     Priority: Medium    Numbness in both hands 03/16/2023     Priority: Medium    Chronic, continuous use of opioids 03/16/2023     Priority: Medium    Primary osteoarthritis involving multiple joints 01/11/2023     Priority: Medium    Gastroesophageal reflux disease with esophagitis without hemorrhage 01/11/2023     Priority: Medium    Essential hypertension 06/02/2022     Priority: Medium    Psoriatic arthropathy (H) 06/02/2022      Priority: Medium    Alcohol use disorder, moderate, in sustained remission (H) 02/23/2022     Priority: Medium    History of cervical spinal surgery 12/21/2021     Priority: Medium     Angela Gregory MD   12/21/2021  4:11 PM   Spinal stenosis in cervical region [M48.02]  Cervical radiculopathy [M54.12]  CERVICAL 3-CERVICAL 6 LEFT OPEN DOOR LAMINOPLASTY AND LEFT CERVICAL 4-5 AND CERVICAL 6-7 POSTERIOR FORAMINOTOMY        Cervical stenosis of spinal canal 12/21/2021     Priority: Medium     CT C spine 12/2022: CANAL/FORAMINA: Reversal normal lordotic curvature. Multilevel spondylosis result in various levels and degrees of central canal stenosis and neural foraminal stenosis. C6-C7 severe central canal stenosis. Multilevel severe neural foraminal stenosis at   left C2-C3, bilateral C3-C4, left C4-C5, bilateral C5-C6, bilateral C6-C7, right C7-T1, right T1-T2, right T2-T3, left T3-T4.  Multiple levels demonstrate mild degenerative grade 1 subluxations.     PARASPINAL: No significant extraspinal abnormality.                                                         IMPRESSION:   1.  No acute fracture.   2.  Left C3 C6 laminoplasty.   3.  Degenerative changes described above.    MRI 5/1/23:   1. Surgical changes of left-sided open-door laminoplasty from C3 to  C6. No residual high-grade spinal canal stenosis. Mild spinal canal  stenosis is seen at the C6-7 level.  2. Multilevel neural foraminal stenosis detailed above. There is  moderate to severe right-sided neural foraminal stenosis at the C3-4.      Morbid obesity (H) 11/20/2021     Priority: Medium    Bipolar 2 disorder (H) 11/01/2021     Priority: Medium    Generalized anxiety disorder 07/15/2021     Priority: Medium    Restless leg syndrome 07/15/2021     Priority: Medium    Vitamin B12 deficiency 07/15/2021     Priority: Medium     Formatting of this note might be different from the original.  Created by Conversion      Vitamin D deficiency 07/15/2021      Priority: Medium     Formatting of this note might be different from the original.  Created by Conversion    Replacement Utility updated for latest IMO load      Hypokalemia 09/18/2020     Priority: Medium    Type 2 diabetes mellitus without complication, without long-term current use of insulin (H)      Priority: Medium    YKAW (obstructive sleep apnea) 12/11/2019     Priority: Medium    Postablative hypothyroidism 11/19/2019     Priority: Medium     Formatting of this note might be different from the original.  Created by Conversion    Replacement Utility updated for latest IMO load  Formatting of this note might be different from the original.  Created by Conversion    Replacement Utility updated for latest IMO load      Class 2 obesity due to excess calories without serious comorbidity in adult 11/19/2019     Priority: Medium    Pulmonary hypertension (H) 09/24/2019     Priority: Medium     Cardiac Catheterization    Reading physicians: Roland Pathak MD; Bryce Pierre MD Ordering physician: Francisco J Edwards MD Study date: 10/3/19   1. Normal coronary arteries.  Right sided filling pressures are moderately elevated.  Moderately elevated pulmonary artery hypertension.  Left sided filling pressures are mildly elevated.  Normal cardiac output level.           Hereditary hemochromatosis (H24)      Priority: Medium    COPD (chronic obstructive pulmonary disease) (H) 12/02/2016     Priority: Medium     Created by Conversion        DJD (degenerative joint disease), ankle and foot 06/29/2015     Priority: Medium      Past Medical History:   Diagnosis Date    Bipolar 2 disorder (H)     Breast cancer (H) 1986    lumpectomy, radiation, chemo    Chronic pain syndrome     COPD (chronic obstructive pulmonary disease) (H)     asthma    Cord compression (H) 12/21/2021    Dizzy     Drug tolerance     opioid    Esophageal reflux     Fatigue     Generalized anxiety disorder     Graves disease 1994     Hemochromatosis 02/14/2018    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 Created by Conversion  Replacement Utility updated for latest IMO load Formatting of this note might be different from the original. Created by Conversion  Replacement Utility updated for latest IMO load    History of corticosteroid therapy 11/19/2019    History of partial adrenalectomy (H24) 11/19/2019    History of pheochromocytoma 11/19/2019    Hx antineoplastic chemotherapy     Hx of radiation therapy     Hyperlipidaemia     Hypertension     Impaired fasting glucose 2017    Injury of neck, whiplash 07/15/2021    Joint pain     KYAW (obstructive sleep apnea) 2016    Osteopenia     Pheochromocytoma, left 08/02/2017    laparoscopically removed    Postablative hypothyroidism 1995    Prediabetes 10/03/2019    by A1c    Psoriasis     Psoriatic arthropathy (H)     Right rotator cuff tear     RLS (restless legs syndrome)     on ropinorole    Sacroiliitis (H24)     Serotonin syndrome 08/28/2020    [facility] - While on desvenlafaxine 100mg    Snoring     Spinal stenosis     Status post coronary angiogram 10/03/2019    Urinary incontinence     Vitamin B 12 deficiency 2009    Vitamin D deficiency 2010     Past Surgical History:   Procedure Laterality Date    ARTHRODESIS ANKLE      ARTHROPLASTY ANKLE Right 6/29/2015    Procedure: ARTHROPLASTY ANKLE;  Surgeon: Jason Coughlin MD;  Location: [facility]    ARTHROPLASTY REVISION ANKLE Right 6/29/2015    Procedure: ARTHROPLASTY REVISION ANKLE;  Surgeon: Jason Coughlin MD;  Location: [facility]    BIOPSY BREAST      BREAST BIOPSY, CORE RT/LT      COLONOSCOPY      COLONOSCOPY N/A 2/25/2021    Procedure: COLONOSCOPY;  Surgeon: Guru Elke Tolbert MD;  Location:  GI    CV CORONARY ANGIOGRAM N/A 10/3/2019    Procedure: CV CORONARY ANGIOGRAM;  Surgeon: Bryce Pierre MD;  Location:   HEART CARDIAC CATH LAB    CV RIGHT HEART CATH MEASUREMENTS RECORDED N/A 10/3/2019    Procedure: CV RIGHT HEART CATH;  Surgeon: Bryce Pierre MD;  Location:  HEART CARDIAC CATH LAB    ESOPHAGOSCOPY, GASTROSCOPY, DUODENOSCOPY (EGD), COMBINED N/A 2/25/2021    Procedure: ESOPHAGOGASTRODUODENOSCOPY, WITH BIOPSY;  Surgeon: Guru Elke Tolbert MD;  Location:  GI    EYE SURGERY  2021    HC REMOVE TONSILS/ADENOIDS,<9 Y/O      Description: Tonsillectomy With Adenoidectomy;  Recorded: 04/07/2010;    IR LUMBAR EPIDURAL STEROID INJECTION  10/26/2004    IR LUMBAR EPIDURAL STEROID INJECTION  11/16/2004    IR LUMBAR EPIDURAL STEROID INJECTION  12/21/2004    IR LUMBAR EPIDURAL STEROID INJECTION  6/8/2006    JOINT REPLACEMENT      LAMINOPLASTY CERVICAL POSTERIOR THREE+ LEVELS Left 12/21/2021    Procedure: CERVICAL 3-CERVICAL 6 LEFT OPEN DOOR LAMINOPLASTY AND LEFT CERVICAL 4-5 AND CERVICAL 6-7 POSTERIOR FORAMINOTOMY;  Surgeon: Angela Gregory MD;  Location: [facility]    LAPAROSCOPIC ADRENALECTOMY Left 08/02/2017    pheochromocytoma    LAPAROSCOPIC ADRENALECTOMY Left 8/2/2017    Procedure: LAPAROSCOPIC LEFT ADRENALECTOMY, ;  Surgeon: Gab Linares MD;  Location: [facility];  Service:     LENGTHEN TENDON ACHILLES Right 6/29/2015    Procedure: LENGTHEN TENDON ACHILLES;  Surgeon: Jason Coughlin MD;  Location: [facility]    LUMPECTOMY BREAST      LUMPECTOMY BREAST Left 1994    MAMMOPLASTY REDUCTION Right 01/13/2015    [place]    MAMMOPLASTY REDUCTION Right     approx late 2015/early2016    MASTECTOMY      left lumpectomy with axillary node dissection    MASTECTOMY MODIFIED RADICAL      OTHER SURGICAL HISTORY Right     reconstructive breast surgery    OTHER SURGICAL HISTORY      Adrenalectomy for pheochromocytoma    GA MASTECTOMY, MODIFIED RADICAL      Description: Modified Radical Mastectomy Left Breast;  Recorded: 04/07/2010;    REPAIR HAMMER TOE Right 6/29/2015    Procedure: REPAIR  HAMMER TOE;  Surgeon: Jason Coughlin MD;  Location: [facility]    TONSILLECTOMY      TONSILLECTOMY & ADENOIDECTOMY      ZZC ARTHRODESIS,ANKLE,OPEN Right     Description: Ankle Arthrodesis;  Recorded: 04/07/2010;     Current Outpatient Medications   Medication Sig Dispense Refill    albuterol (PROVENTIL) (2.5 MG/3ML) 0.083% neb solution Take 1 vial (2.5 mg) by nebulization every 4 hours as needed for shortness of breath / dyspnea or wheezing 360 mL 5    albuterol (VENTOLIN HFA) 108 (90 Base) MCG/ACT inhaler Inhale 2 puffs into the lungs every 6 hours 18 g 11    allopurinol (ZYLOPRIM) 300 MG tablet Take 1 tablet (300 mg) by mouth daily 30 tablet 11    benzonatate (TESSALON) 200 MG capsule Take 1 capsule (200 mg) by mouth 3 times daily as needed for cough 30 capsule 1    Cholecalciferol (VITAMIN D3) 250 MCG (30563 UT) TABS Take 1 tablet by mouth daily 86872/day      Cyanocobalamin (VITAMIN B-12) 5000 MCG SUBL Place 2-3 sprays under the tongue daily Unknown dose. 2 or 3 sprays/day      ethacrynic acid (EDECRIN) 25 MG tablet Take 1 tablet (25 mg) by mouth every other day 45 tablet 3    Fluticasone-Umeclidin-Vilanterol (TRELEGY ELLIPTA) 200-62.5-25 MCG/ACT oral inhaler Inhale 1 puff into the lungs daily OFFICE VISIT NEEDED FOR ANY FURTHER REFILLS 1 each 0    KLOR-CON 20 MEQ CR tablet Take 1 tablet (20 mEq total) by mouth 2 (two) times a day. 180 tablet 3    magnesium 250 MG tablet Take 1 tablet by mouth 2 times daily      medical cannabis (Patient's own supply) See Admin Instructions (The purpose of this order is to document that the patient reports taking medical cannabis.  This is not a prescription, and is not used to certify that the patient has a qualifying medical condition.)  Flower      omeprazole (PRILOSEC) 20 MG DR capsule Take 1 capsule (20 mg) by mouth daily 90 capsule 3    oxyCODONE (ROXICODONE) 5 MG tablet Take 1 tablet (5 mg) by mouth every 6 hours as needed for pain (chronic pain) Dispense 10/30 for 10/31  "56 tablet 0    rOPINIRole (REQUIP) 2 MG tablet One tab 3 pm, one bedtime, and one during night on waking 90 tablet 3    STATIN NOT PRESCRIBED (INTENTIONAL) Please choose reason not prescribed from choices below.      SYNTHROID 150 MCG tablet Take 1 tablet (150 mcg) by mouth daily 90 tablet 4    topiramate (TOPAMAX) 25 MG tablet Take 1 tablet (25 mg) by mouth 2 times daily 60 tablet 11    vitamin E 400 units TABS Take 800 Units by mouth daily         Allergies   Allergen Reactions    Serotonin Reuptake Inhibitors (Ssris) Anxiety, Difficulty breathing, Headache, Palpitations and Shortness Of Breath    Buspirone      The patient states she had serotonin syndrome    Cephalexin      Other reaction(s): unknown rxn.    Desvenlafaxine      Serotonin syndrome    Diclofenac Sodium [Diclofenac]      Serotonin syndrome and restless legs syndrome    Gabapentin      Drove on the wrong side of the highway    Levofloxacin      \"CAN'T REMEMBER\"    Penicillins      \"SORES IN MOUTH\"    Riluzole Difficulty breathing and Swelling    Sulfa Antibiotics      \"PT DOES NOT KNOW WHAT THE REACTION WAS\"        Social History     Tobacco Use    Smoking status: Former     Packs/day: 2.50     Years: 20.00     Additional pack years: 0.00     Total pack years: 50.00     Types: Cigarettes     Start date: 1971     Quit date: 2000     Years since quittin.3    Smokeless tobacco: Never   Substance Use Topics    Alcohol use: Not Currently     Comment: relapse 2021 sober        History   Drug Use    Types: Marijuana         Objective     /70 (BP Location: Right arm, Patient Position: Sitting, Cuff Size: Adult Large)   Pulse 77   Temp 98.7  F (37.1  C) (Tympanic)   Resp 16   Ht 1.676 m (5' 6\")   Wt 101.1 kg (222 lb 12.8 oz)   LMP  (LMP Unknown)   SpO2 96%   Breastfeeding No   BMI 35.96 kg/m      Physical Exam    GENERAL APPEARANCE: healthy, alert and no distress     EYES: EOMI, PERRL     HENT: ear canals and TM's normal and " nose and mouth without ulcers or lesions     NECK: no adenopathy, no asymmetry, masses, or scars and thyroid normal to palpation     RESP: lungs clear to auscultation - no rales, rhonchi or wheezes     CV: regular rates and rhythm, normal S1 S2, no S3 or S4 and no murmur, click or rub     ABDOMEN:  soft, nontender, no HSM or masses and bowel sounds normal     MS: extremities normal- no gross deformities noted, no evidence of inflammation in joints, FROM in all extremities.     SKIN: no suspicious lesions or rashes     NEURO: Normal strength and tone, sensory exam grossly normal, mentation intact and speech normal     PSYCH: mentation appears normal. and affect normal/bright     LYMPHATICS: No cervical adenopathy    Recent Labs   Lab Test 10/17/23  1410 09/25/23  1018 07/25/23  1643 07/14/23  1110 07/05/23  1414 12/22/21  0549 12/13/21  1407 11/16/21  0818 11/01/21  1630   HGB 18.3* 17.6*   < >  --   --    < > 15.7   < > 16.3*    244   < >  --   --    < > 227   < > 229   INR  --   --   --   --   --   --  0.94  --  0.92     --   --   --  142   < > 139   < > 142   POTASSIUM 4.1  --   --   --  4.2   < > 4.1   < > 3.7   CR 0.67 0.69  --   --  0.65   < > 0.64   < > 0.60   A1C 5.7*  --   --  5.8*  --    < >  --   --   --     < > = values in this interval not displayed.      Gets brand name syntrhoid   Lab Results   Component Value Date    A1C 5.7 10/17/2023    A1C 5.8 07/14/2023    A1C 5.6 01/18/2023    A1C 5.6 08/31/2022    A1C 5.7 06/07/2022    A1C 6.1 10/30/2020    A1C 7.2 02/12/2020    A1C 6.0 10/03/2019     Uric Acid   Date Value Ref Range Status   10/17/2023 3.4 2.4 - 5.7 mg/dL Final       Diagnostics:  No results found for this or any previous visit (from the past 24 hour(s)).   No EKG required, no history of coronary heart disease, significant arrhythmia, peripheral arterial disease or other structural heart disease.    Revised Cardiac Risk Index (RCRI):  The patient has the following serious  cardiovascular risks for perioperative complications:   - No serious cardiac risks = 0 points     RCRI Interpretation: 1 point: Class II (low risk - 0.9% complication rate)         Signed Electronically by: Aminata Arteaga MD  Copy of this evaluation report is provided to requesting physician.

## 2023-10-19 NOTE — NURSING NOTE
Clinic Administered Medication Documentation      Injectable Medication Documentation    Is there an active order (written within the past 365 days, with administrations remaining, not ) in the chart? Yes.     Patient was given Cyanocobalamin (B-12). Prior to medication administration, verified patient's identity using patient s name and date of birth. Please see MAR and medication order for additional information. Patient instructed to remain in clinic for 15 minutes, report any adverse reaction to staff immediately, and remain in clinic for 15 minutes and report any adverse reaction to staff immediately but patient declined.    Vial/Syringe: Single dose vial. Was entire vial of medication used? Yes  Was this medication supplied by the patient? No  Is this a medication the patient will need to receive again? Yes. Verified that the patient has refills remaining in their prescription.

## 2023-10-20 ENCOUNTER — TELEPHONE (OUTPATIENT)
Dept: INTERNAL MEDICINE | Facility: CLINIC | Age: 68
End: 2023-10-20
Payer: MEDICARE

## 2023-10-20 ENCOUNTER — PATIENT OUTREACH (OUTPATIENT)
Dept: ONCOLOGY | Facility: CLINIC | Age: 68
End: 2023-10-20
Payer: MEDICARE

## 2023-10-20 DIAGNOSIS — J44.9 CHRONIC OBSTRUCTIVE PULMONARY DISEASE, UNSPECIFIED COPD TYPE (H): ICD-10-CM

## 2023-10-20 NOTE — PROGRESS NOTES
[facility]: Cancer Care                                                                                          Winnie calls and leaves a message that she is having surgery on Monday at [facility]  pinning and bone spur removal.  She is concerned about her hemoglobin 18.3 and Hematocrit level 52.6 being high for the procedure.    I called her back and we discussed the following:    Winnie had a phlebotomy scheduled this week which was cancelled because it did not meet the parameters as ordered by Dr. Pablo which were Hgb more than 11 AND ferritin more than 50.    Winnie had a pre operative visit yesterday with Aminata Arteaga MD. Per that note the Hgb was not a contraindication for surgery but was advised to take aspirin afterward.    Winnie discussed this with the surgeon and anesthesiologist and Winnie was unsure if this result was OK to proceed with the surgery.  She wants Dr. Pablo's opinion on this situation.  I informed her that Dr. Pablo was out of the office today and I would ask the provider covering for her to review    Winnie was asking about a phlebotomy to be done sometime before the procedure.  I reinforced that she did not meet parameters.  I encouraged her to contact the surgeon/anesthesiologist and ask them to call our office to speak provider to provider if they have questions about proceeding with her hemoglobin at that level.    She will contact [facility] and ask them to call us to discuss.  Their number is     Signature:  Michelle Bearden RN

## 2023-10-20 NOTE — PROGRESS NOTES
[facility]: Cancer Care                                                                                          Winnie calls back and states that she called [facility] and they were OK with her Hemoglobin at the current level.  She said no phone call is needed between providers    Signature:  Michelle Bearden RN

## 2023-10-20 NOTE — TELEPHONE ENCOUNTER
General Call    Contacts         Type Contact Phone/Fax    10/20/2023 09:00 AM CDT Phone (Incoming) [phone]     Nurse ferimn Connors          Reason for Call:  preop note to be completed    What are your questions or concerns:  Pt has surgery on Monday and preop not is not completed.  Please complete note.    [facility] can look at our notes so no need to fax it.    Date of last appointment with provider: 10/19/23    Please call JV Connors [phone] ok for detailed msg

## 2023-10-24 LAB
OXYCODONE UR CFM-MCNC: 1220 NG/ML
OXYCODONE/CREAT UR: 1544 NG/MG {CREAT}
OXYMORPHONE UR CFM-MCNC: 1020 NG/ML
OXYMORPHONE/CREAT UR: 1291 NG/MG {CREAT}

## 2023-10-31 ENCOUNTER — VIRTUAL VISIT (OUTPATIENT)
Dept: PSYCHOLOGY | Facility: CLINIC | Age: 68
End: 2023-10-31
Payer: MEDICARE

## 2023-10-31 DIAGNOSIS — F43.9 TRAUMA AND STRESSOR-RELATED DISORDER: ICD-10-CM

## 2023-10-31 DIAGNOSIS — F41.1 GENERALIZED ANXIETY DISORDER: ICD-10-CM

## 2023-10-31 DIAGNOSIS — F31.81 BIPOLAR 2 DISORDER (H): Primary | ICD-10-CM

## 2023-10-31 PROCEDURE — 90837 PSYTX W PT 60 MINUTES: CPT | Mod: VID | Performed by: SOCIAL WORKER

## 2023-10-31 ASSESSMENT — ANXIETY QUESTIONNAIRES
1. FEELING NERVOUS, ANXIOUS, OR ON EDGE: NEARLY EVERY DAY
GAD7 TOTAL SCORE: 15
4. TROUBLE RELAXING: NEARLY EVERY DAY
7. FEELING AFRAID AS IF SOMETHING AWFUL MIGHT HAPPEN: MORE THAN HALF THE DAYS
2. NOT BEING ABLE TO STOP OR CONTROL WORRYING: MORE THAN HALF THE DAYS
8. IF YOU CHECKED OFF ANY PROBLEMS, HOW DIFFICULT HAVE THESE MADE IT FOR YOU TO DO YOUR WORK, TAKE CARE OF THINGS AT HOME, OR GET ALONG WITH OTHER PEOPLE?: VERY DIFFICULT
IF YOU CHECKED OFF ANY PROBLEMS ON THIS QUESTIONNAIRE, HOW DIFFICULT HAVE THESE PROBLEMS MADE IT FOR YOU TO DO YOUR WORK, TAKE CARE OF THINGS AT HOME, OR GET ALONG WITH OTHER PEOPLE: VERY DIFFICULT
7. FEELING AFRAID AS IF SOMETHING AWFUL MIGHT HAPPEN: MORE THAN HALF THE DAYS
6. BECOMING EASILY ANNOYED OR IRRITABLE: MORE THAN HALF THE DAYS
5. BEING SO RESTLESS THAT IT IS HARD TO SIT STILL: SEVERAL DAYS
GAD7 TOTAL SCORE: 15
GAD7 TOTAL SCORE: 15
3. WORRYING TOO MUCH ABOUT DIFFERENT THINGS: MORE THAN HALF THE DAYS

## 2023-10-31 NOTE — PROGRESS NOTES
[facility]                                     Progress Note    Patient Name: Randi Cleary  Date: 10/31/23         Service Type: Individual     Session Start Time: 9:04 AM Session End Time: 9:59 AM     Session Length: 55 minutes    Session #: 209    Attendees: Client attended alone    Service Modality:  Video Visit:      Provider verified identity through the following two step process.  Patient provided:  Patient is known previously to provider    Telemedicine Visit: The patient's condition can be safely assessed and treated via synchronous audio and visual telemedicine encounter.      Reason for Telemedicine Visit: Patient convenience (e.g. access to timely appointments / distance to available provider)    Originating Site (Patient Location): Patient's home    Distant Site (Provider Location): [facility] [place]    Consent:  The patient/guardian has verbally consented to: the potential risks and benefits of telemedicine (video visit) versus in person care; bill my insurance or make self-payment for services provided; and responsibility for payment of non-covered services.     Patient would like the video invitation sent by:  My Chart    Mode of Communication:  Video Conference via [facility]    Distant Location (Provider):  On-site    As the provider I attest to compliance with applicable laws and regulations related to telemedicine.      DATA  Extended Session (53+ minutes): PROLONGED SERVICE IN THE OUTPATIENT SETTING REQUIRING DIRECT (FACE-TO-FACE) PATIENT CONTACT BEYOND THE USUAL SERVICE:    - Treatment protocol required additional time to complete, due to the nature of diagnosis being treated.  See Interventions section for details  Interactive Complexity: No  Crisis: No        Progress Since Last Session (Related to Symptoms / Goals / Homework):   Symptoms:  Patient is struggling with mobility post surgery which is impacting her  mood      Homework:  Progress made  Schedule shoulder surgery/see Vincent  Take care of yourself as you prep for foot surgery       Episode of Care Goals: Satisfactory progress - ACTION (Actively working towards change); Intervened by reinforcing change plan / affirming steps taken     Current / Ongoing Stressors and Concerns:   Patient is currently socially isolated. She has a conflictual relationship with her .  She is getting minimal physical activity.  She has had several surgeries. Patient's car was just insured.     Treatment Objective(s) Addressed in This Session:   Patient will increase frequency of engaging her in ADLs.  Patient will track and record at least 5 pleasant exchanges with . Patient will be able to identify at least 5 positive traits about her .  Patient will reduce level of depressive and anxious features as evidenced by reduction in score on her CHAVO-7 and PHQ-9 (scores of 15 and 16 at first measurment, respectively).     Intervention:   Supportive Therapy:   Discussed patient's physical recovery. Also reviewed upcoming appointments she is looking forward to. Looked at friendships and explored emotions and concerns coming up with this. Talked about the regulation she has gained through therapy and her next steps in this.     The following assessments were completed by patient for this visit:  PHQ9:       9/13/2023    10:57 AM 9/18/2023     9:51 AM 9/20/2023    10:10 AM 9/27/2023     4:45 AM 10/9/2023     8:52 AM 10/16/2023     8:25 AM 10/31/2023     4:16 AM   PHQ-9 SCORE   PHQ-9 Total Score MyChart 17 (Moderately severe depression) 18 (Moderately severe depression) 18 (Moderately severe depression) 17 (Moderately severe depression) 18 (Moderately severe depression) 12 (Moderate depression) 18 (Moderately severe depression)   PHQ-9 Total Score 17 18 18 17    17 18 12 18     GAD7:       7/21/2023    10:34 AM 8/7/2023     1:03 PM 8/21/2023    11:07 AM 9/5/2023     1:30 PM  9/20/2023     9:59 AM 10/9/2023     8:55 AM 10/31/2023     4:25 AM   CHAVO-7 SCORE   Total Score 18 (severe anxiety) 18 (severe anxiety) 13 (moderate anxiety) 15 (severe anxiety) 16 (severe anxiety) 15 (severe anxiety) 15 (severe anxiety)   Total Score 18 18    18 13 15 16    16    16 15 15     PROMIS 10-Global Health (only subscores and total score):       6/22/2023    10:03 AM 7/6/2023     9:12 AM 7/21/2023    10:36 AM 7/28/2023     3:24 PM 8/7/2023     1:07 PM 9/20/2023    10:13 AM 10/31/2023     4:36 AM   PROMIS-10 Scores Only   Global Mental Health Score 5    5 5    5 5    5  6    6     5    5 6   Global Physical Health Score 7    7 8    8 7    7  8    8     9    9 8   PROMIS TOTAL - SUBSCORES 12    12 13    13 12    12  14    14     14    14 14       Information is confidential and restricted. Go to Review Flowsheets to unlock data.    Multiple values from one day are sorted in reverse-chronological order        ASSESSMENT: Current Emotional / Mental Status (status of significant symptoms):   Risk status (Self / Other harm or suicidal ideation)   Patient denies current fears or concerns for personal safety.   Patient denies current or recent suicidal ideation or behaviors.   Patient denies current or recent homicidal ideation or behaviors.   Patient denies current or recent self injurious behavior or ideation.   Patient denies other safety concerns.   Patient reports there has been no change in risk factors since their last session.     Patient reports there has been no change in protective factors since their last session.     A safety and risk management plan has been developed including: Patient consented to co-developed safety plan on 11/24/2020, updated 2/20/23.  Safety and risk management plan was reviewed.   Patient agreed to use safety plan should any safety concerns arise.  A copy was made available to the patient.     Appearance:   Appropriate    Eye Contact:   Good    Psychomotor Behavior: Normal     Attitude:   Cooperative    Orientation:   All   Speech    Rate / Production: Normal/ Responsive    Volume:  Normal    Mood:    Anxious    Affect:    Appropriate  Tearful   Thought Content:  Clear    Thought Form:  Coherent    Insight:    Good      Medication Review:   No changes to current psychiatric medication(s)     Medication Compliance:   Yes     Changes in Health Issues:   None reported     Chemical Use Review:   Substance Use: Chemical use reviewed, no active concerns identified Nothing used since 2021.     Tobacco Use: No current tobacco use.      Diagnosis:  1. Bipolar 2 disorder (H)    2. Generalized anxiety disorder    3. Trauma and stressor-related disorder        Collateral Reports Completed:   Not Applicable    PLAN: (Patient Tasks / Therapist Tasks / Other)  Schedule shoulder surgery/see Lervick  Take care of yourself as you recover        There has been demonstrated improvement in functioning while patient has been engaged in psychotherapy/psychological service- if withdrawn the patient would deteriorate and/or relapse.     MICAELA SLADE, [facility]   2023                                                        ______________________________________________________________________    Individual Treatment Plan    Patient's Name: Randi Cleary  YOB: 1955    Date of Creation: 20  Date Treatment Plan Last Reviewed/Revised: 23    DSM5 Diagnoses: 296.89 Bipolar II Disorder Depressed, 300.02 (F41.1) Generalized Anxiety Disorder, or Adjustment Disorders  309.89 (F43.8) Other Specified Trauma and Stressor Related Disorder  Psychosocial / Contextual Factors: Patient's entire family of origin has , she now has a sister-in-law and  as support.  Relationship with  is conflictual. She is recovering from surgeries  PROMIS (reviewed every 90 days): PROMIS-10 Scores  PROMIS 10-Global Health (only subscores and total score):   PROMIS-10 Scores Only  "12/14/2021 3/15/2022 3/15/2022 3/15/2022 3/24/2022 4/1/2022 6/9/2022   Global Mental Health Score 6 6 6 6 8 12 12   Global Physical Health Score 9 9 9 9 8 11 10   PROMIS TOTAL - SUBSCORES 15 15 15 15 16 23 22       Referral / Collaboration:  Referral to another professional/service is not indicated at this time..    Anticipated number of session for this episode of care: 50  Anticipation frequency of session: Biweekly  Anticipated Duration of each session: 53 or more minutes due to intensity of trauma symptoms  Treatment plan will be reviewed in 90 days or when goals have been changed.   There has been demonstrated improvement in functioning while patient has been engaged in psychotherapy/psychological service- if withdrawn the patient would deteriorate and/or relapse.       MeasurableTreatment Goal(s) related to diagnosis / functional impairment(s)  Goal 1: Patient will \"jumpstart, getting going with the things I need to be doing around the house as far as picking up, doing things, trying to do something every day.  Also to lessen the animosity between me and my .\"    I will know I've met my goal when my shoulders are fixed and I can see.      Objective #A (Patient Action)    Patient will  increase frequency of engaging her in ADLs .  Status: Continued - Date(s): 12/10/21, 3/9/22, 6/9/22, 9/2/22, 12/1/22, 3/2/23, 6/6/23, 9/13/23    Intervention(s)  Therapist will  engage patient in CBT, specifically behavioral activation .    Objective #B  Patient will   track and record at least 5 pleasant exchanges with . Patient will be able to identify at least 5 positive traits about her  and how he relates to her .  Status: Continued - Date(s): 12/10/21, 3/9/22, 6/9/22, 9/2/22, 12/1/22, 3/2/23, 6/6/23, 9/13/23    Intervention(s)  Therapist will  teach assertiveness skills and assign homework related to relationship interactions .    Objective #C  Patient will  reduce level of depressive and anxious " "features as evidenced by reduction in score on her CHAVO-7 and PHQ-9 (scores of 15 and 16 at first measurment, respectively) .  Status: Continued - Date(s): 12/10/21, 3/9/22, 6/9/22, 9/2/22, 12/1/22, 3/2/23, 6/6/23, 9/13/23    Intervention(s)  Therapist will  engage patient in person-centered therapy and CBT .    Patient has reviewed and agreed to the above plan.      MICAELA SLADE, [facility]  September 13, 2023                                                   Randi Cleary          SAFETY PLAN:  Step 1: Warning signs / cues (Thoughts, images, mood, situation, behavior) that a crisis may be developing:  Thoughts: \"I don't want to continue\" \"I am unwanted\"  Images: none  Thinking Processes: ruminating  Mood: anger  Behaviors: isolating/withdrawing , can't stop crying, not taking care of myself and not taking care of my responsibilities  Situations: small triggers, such as not being able to find something, or dropping something   Step 2: Coping strategies - Things I can do to take my mind off of my problems without contacting another person (relaxation technique, physical activity):  Distress Tolerance Strategies:  arts and crafts: drawing, play with my pet , listen to positive and upbeat music: any, change body temperature (ice pack/cold water)  and paced breathing/progressive muscle relaxation  Physical Activities: go for a walk, deep breathing and stretching   Focus on helpful thoughts:  \"You've been through this before, you can get through it again.\"  Step 3: People and social settings that provide distraction:                 Name: Carmen                            Name: Darien                           Name: Aleida       pool, shopping, Carmen's house, [employer]       Step 4: Remind myself of people and things that are important to me and worth living for:  Clifford Little Donna, post-COVID world, options of what could be in your future        Step 5: When I am in crisis, I can ask these people to help me use my " safety plan:                 Name: Sidney  Step 6: Making the environment safe:   go to sleep/daydream  Step 7: Professionals or agencies I can contact during a crisis:  [facility] Daytime Number: [phone]  Suicide Prevention Lifeline: 9-444-242-HQML (8255)  Crisis Text Line Service (available 24 hours a day, 7 days a week): Text [place] to 538901  Local Crisis Services: [facility] Crisis: [phone]  Adults can always access to the emPATH unit at [facility] (no phone number, utilize it like an urgent care or ER where you just show up)     Call 911 or go to my nearest emergency department.       I helped develop this safety plan and agree to use it when needed.  I have been given a copy of this plan.       Client signature _________________________________________________________________  Today s date:  11/24/2020  Adapted from Safety Plan Template 2008 Randi Poole and Robby Barba is reprinted with the express permission of the authors.  No portion of the Safety Plan Template may be reproduced without the express, written permission.  You can contact the authors at [email or web address] or [email or web address].

## 2023-11-01 ENCOUNTER — VIRTUAL VISIT (OUTPATIENT)
Dept: PSYCHOLOGY | Facility: CLINIC | Age: 68
End: 2023-11-01
Payer: MEDICARE

## 2023-11-01 ENCOUNTER — VIRTUAL VISIT (OUTPATIENT)
Dept: PHARMACY | Facility: CLINIC | Age: 68
End: 2023-11-01
Payer: COMMERCIAL

## 2023-11-01 DIAGNOSIS — J44.9 CHRONIC OBSTRUCTIVE PULMONARY DISEASE, UNSPECIFIED COPD TYPE (H): Primary | ICD-10-CM

## 2023-11-01 DIAGNOSIS — K21.9 GASTROESOPHAGEAL REFLUX DISEASE WITHOUT ESOPHAGITIS: ICD-10-CM

## 2023-11-01 DIAGNOSIS — F31.81 BIPOLAR 2 DISORDER (H): Primary | ICD-10-CM

## 2023-11-01 DIAGNOSIS — M10.9 URIC ACID ARTHROPATHY: ICD-10-CM

## 2023-11-01 DIAGNOSIS — R52 PAIN: ICD-10-CM

## 2023-11-01 DIAGNOSIS — E03.9 HYPOTHYROIDISM, UNSPECIFIED TYPE: ICD-10-CM

## 2023-11-01 DIAGNOSIS — F41.1 GENERALIZED ANXIETY DISORDER: ICD-10-CM

## 2023-11-01 DIAGNOSIS — I27.20 PULMONARY HYPERTENSION (H): ICD-10-CM

## 2023-11-01 DIAGNOSIS — Z78.9 TAKES DIETARY SUPPLEMENTS: ICD-10-CM

## 2023-11-01 DIAGNOSIS — F43.9 TRAUMA AND STRESSOR-RELATED DISORDER: ICD-10-CM

## 2023-11-01 DIAGNOSIS — G25.81 RLS (RESTLESS LEGS SYNDROME): ICD-10-CM

## 2023-11-01 PROCEDURE — 99207 PR NO CHARGE LOS: CPT | Mod: VID | Performed by: PHARMACIST

## 2023-11-01 PROCEDURE — 90834 PSYTX W PT 45 MINUTES: CPT | Mod: VID | Performed by: SOCIAL WORKER

## 2023-11-01 RX ORDER — MULTIVIT WITH MINERALS/LUTEIN
1000 TABLET ORAL DAILY
COMMUNITY

## 2023-11-01 RX ORDER — GUAIFENESIN 600 MG/1
600 TABLET, EXTENDED RELEASE ORAL EVERY MORNING
COMMUNITY
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
## 2023-11-01 NOTE — Clinical Note
Hello,  I met with this patient for MTM per psychiatry to review medications and past med trials. She reported feeling some dizziness from the allopurinol and she wondered about reducing the dose. I told her that I could reach out to you to see what you think. I will see her again on 11/13 to finish reviewing meds, so am happy to pass on the plan. Thank you! Felicia Hicks, PharmD Medication Therapy Management Pharmacist [facility]

## 2023-11-01 NOTE — Clinical Note
Hi- I saw this patient today, but we were only able to get through med rec. I do have her scheduled for another visit on 11/13 to go through past med trials. I see that she is scheduled to see Arnaldo Varela at the end of January. Is there a follow up plan for our team? Thanks,  Felicia
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
## 2023-11-03 ENCOUNTER — VIRTUAL VISIT (OUTPATIENT)
Dept: PSYCHOLOGY | Facility: CLINIC | Age: 68
End: 2023-11-03
Payer: MEDICARE

## 2023-11-03 DIAGNOSIS — F31.81 BIPOLAR 2 DISORDER (H): Primary | ICD-10-CM

## 2023-11-03 DIAGNOSIS — F41.1 GENERALIZED ANXIETY DISORDER: ICD-10-CM

## 2023-11-03 DIAGNOSIS — F43.9 TRAUMA AND STRESSOR-RELATED DISORDER: ICD-10-CM

## 2023-11-03 PROCEDURE — 90834 PSYTX W PT 45 MINUTES: CPT | Mod: VID | Performed by: SOCIAL WORKER

## 2023-11-03 NOTE — PROGRESS NOTES
[facility]                                     Progress Note    Patient Name: Randi Cleary  Date: 11/3/23         Service Type: Individual     Session Start Time: 1:34 PM Session End Time: 2:20 PM     Session Length: 41 minutes    Session #: 211    Attendees: Client attended alone    Service Modality:  Video Visit:      Provider verified identity through the following two step process.  Patient provided:  Patient is known previously to provider    Telemedicine Visit: The patient's condition can be safely assessed and treated via synchronous audio and visual telemedicine encounter.      Reason for Telemedicine Visit: Patient convenience (e.g. access to timely appointments / distance to available provider)    Originating Site (Patient Location): Patient's home    Distant Site (Provider Location): Provider Remote Setting- Home Office    Consent:  The patient/guardian has verbally consented to: the potential risks and benefits of telemedicine (video visit) versus in person care; bill my insurance or make self-payment for services provided; and responsibility for payment of non-covered services.     Patient would like the video invitation sent by:  My Chart    Mode of Communication:  Video Conference via [facility]    Distant Location (Provider):  Off-site    As the provider I attest to compliance with applicable laws and regulations related to telemedicine.      DATA  Extended Session (53+ minutes): No  Interactive Complexity: No  Crisis: No        Progress Since Last Session (Related to Symptoms / Goals / Homework):   Symptoms:  Patient is continuing to have a lot of medical concerns and relationship concerns which are negatively impacting her mood and sense of overwhelm.      Homework:  Progress made  Next session work on family stuff -not done, patient had other concerns  Call about orthotics -scheduled  Schedule shoulder surgery/see L[facility]ick -not done       Episode of Care Goals: Satisfactory  progress - ACTION (Actively working towards change); Intervened by reinforcing change plan / affirming steps taken     Current / Ongoing Stressors and Concerns:   Patient is currently socially isolated. She has a conflictual relationship with her .  She is getting minimal physical activity.  She has had several surgeries.      Treatment Objective(s) Addressed in This Session:   Patient will increase frequency of engaging her in ADLs.  Patient will track and record at least 5 pleasant exchanges with . Patient will be able to identify at least 5 positive traits about her .  Patient will reduce level of depressive and anxious features as evidenced by reduction in score on her CHAVO-7 and PHQ-9 (scores of 15 and 16 at first measurment, respectively).     Intervention:   Supportive Therapy:   Discussed patient's appointment with the pharmacist and the questions and concerns related to prescribed marijuana and opioid usage. Processed what this was like for patient and how she'd like to continue this conversation with the pharmacist. Looked at next steps in her getting support, including the upcoming interventional psychiatry. Talked about ways she will continue to get support and how she is using therapy time to process, which is helping increase her ability to cope with day to day stressors.       The following assessments were completed by patient for this visit:  PHQ9:       9/13/2023    10:57 AM 9/18/2023     9:51 AM 9/20/2023    10:10 AM 9/27/2023     4:45 AM 10/9/2023     8:52 AM 10/16/2023     8:25 AM 10/31/2023     4:16 AM   PHQ-9 SCORE   PHQ-9 Total Score MyChart 17 (Moderately severe depression) 18 (Moderately severe depression) 18 (Moderately severe depression) 17 (Moderately severe depression) 18 (Moderately severe depression) 12 (Moderate depression) 18 (Moderately severe depression)   PHQ-9 Total Score 17 18 18 17    17 18 12 18     GAD7:       7/21/2023    10:34 AM 8/7/2023     1:03 PM  8/21/2023    11:07 AM 9/5/2023     1:30 PM 9/20/2023     9:59 AM 10/9/2023     8:55 AM 10/31/2023     4:25 AM   CHAVO-7 SCORE   Total Score 18 (severe anxiety) 18 (severe anxiety) 13 (moderate anxiety) 15 (severe anxiety) 16 (severe anxiety) 15 (severe anxiety) 15 (severe anxiety)   Total Score 18 18    18 13 15 16    16    16 15 15     PROMIS 10-Global Health (only subscores and total score):       6/22/2023    10:03 AM 7/6/2023     9:12 AM 7/21/2023    10:36 AM 7/28/2023     3:24 PM 8/7/2023     1:07 PM 9/20/2023    10:13 AM 10/31/2023     4:36 AM   PROMIS-10 Scores Only   Global Mental Health Score 5    5 5    5 5    5  6    6     5    5 6   Global Physical Health Score 7    7 8    8 7    7  8    8     9    9 8   PROMIS TOTAL - SUBSCORES 12    12 13    13 12    12  14    14     14    14 14       Information is confidential and restricted. Go to Review Flowsheets to unlock data.    Multiple values from one day are sorted in reverse-chronological order        ASSESSMENT: Current Emotional / Mental Status (status of significant symptoms):   Risk status (Self / Other harm or suicidal ideation)   Patient denies current fears or concerns for personal safety.   Patient denies current or recent suicidal ideation or behaviors.   Patient denies current or recent homicidal ideation or behaviors.   Patient denies current or recent self injurious behavior or ideation.   Patient denies other safety concerns.   Patient reports there has been no change in risk factors since their last session.     Patient reports there has been no change in protective factors since their last session.     A safety and risk management plan has been developed including: Patient consented to co-developed safety plan on 11/24/2020, updated 2/20/23.  Safety and risk management plan was reviewed.   Patient agreed to use safety plan should any safety concerns arise.  A copy was made available to the patient.     Appearance:   Appropriate    Eye  Contact:   Good    Psychomotor Behavior: Normal    Attitude:   Cooperative    Orientation:   All   Speech    Rate / Production: Normal/ Responsive    Volume:  Normal    Mood:    Anxious    Affect:    Appropriate  Tearful   Thought Content:  Clear    Thought Form:  Coherent    Insight:    Good      Medication Review:   No changes to current psychiatric medication(s)     Medication Compliance:   Yes     Changes in Health Issues:   None reported     Chemical Use Review:   Substance Use: Chemical use reviewed, no active concerns identified Nothing used since 2021.     Tobacco Use: No current tobacco use.      Diagnosis:  1. Bipolar 2 disorder (H)    2. Generalized anxiety disorder    3. Trauma and stressor-related disorder      Collateral Reports Completed:   Not Applicable    PLAN: (Patient Tasks / Therapist Tasks / Other)  Next session work on family stuff  Schedule shoulder surgery/see Lervick  Continue to talk with pharmacists about concerns      There has been demonstrated improvement in functioning while patient has been engaged in psychotherapy/psychological service- if withdrawn the patient would deteriorate and/or relapse.     MICAELA SLADE, [facility]   November 3, 2023                                                        ______________________________________________________________________    Individual Treatment Plan    Patient's Name: Randi Cleary  YOB: 1955    Date of Creation: 20  Date Treatment Plan Last Reviewed/Revised: 23    DSM5 Diagnoses: 296.89 Bipolar II Disorder Depressed, 300.02 (F41.1) Generalized Anxiety Disorder, or Adjustment Disorders  309.89 (F43.8) Other Specified Trauma and Stressor Related Disorder  Psychosocial / Contextual Factors: Patient's entire family of origin has , she now has a sister-in-law and  as support.  Relationship with  is conflictual. She is recovering from surgeries  PROMIS (reviewed every 90 days): PROMIS-10  "Scores  PROMIS 10-Global Health (only subscores and total score):   PROMIS-10 Scores Only 12/14/2021 3/15/2022 3/15/2022 3/15/2022 3/24/2022 4/1/2022 6/9/2022   Global Mental Health Score 6 6 6 6 8 12 12   Global Physical Health Score 9 9 9 9 8 11 10   PROMIS TOTAL - SUBSCORES 15 15 15 15 16 23 22       Referral / Collaboration:  Referral to another professional/service is not indicated at this time..    Anticipated number of session for this episode of care: 50  Anticipation frequency of session: Biweekly  Anticipated Duration of each session: 53 or more minutes due to intensity of trauma symptoms  Treatment plan will be reviewed in 90 days or when goals have been changed.   There has been demonstrated improvement in functioning while patient has been engaged in psychotherapy/psychological service- if withdrawn the patient would deteriorate and/or relapse.       MeasurableTreatment Goal(s) related to diagnosis / functional impairment(s)  Goal 1: Patient will \"jumpstart, getting going with the things I need to be doing around the house as far as picking up, doing things, trying to do something every day.  Also to lessen the animosity between me and my .\"    I will know I've met my goal when my shoulders are fixed and I can see.      Objective #A (Patient Action)    Patient will  increase frequency of engaging her in ADLs .  Status: Continued - Date(s): 12/10/21, 3/9/22, 6/9/22, 9/2/22, 12/1/22, 3/2/23, 6/6/23, 9/13/23    Intervention(s)  Therapist will  engage patient in CBT, specifically behavioral activation .    Objective #B  Patient will   track and record at least 5 pleasant exchanges with . Patient will be able to identify at least 5 positive traits about her  and how he relates to her .  Status: Continued - Date(s): 12/10/21, 3/9/22, 6/9/22, 9/2/22, 12/1/22, 3/2/23, 6/6/23, 9/13/23    Intervention(s)  Therapist will  teach assertiveness skills and assign homework related to relationship " "interactions .    Objective #C  Patient will  reduce level of depressive and anxious features as evidenced by reduction in score on her CHAVO-7 and PHQ-9 (scores of 15 and 16 at first measurment, respectively) .  Status: Continued - Date(s): 12/10/21, 3/9/22, 6/9/22, 9/2/22, 12/1/22, 3/2/23, 6/6/23, 9/13/23    Intervention(s)  Therapist will  engage patient in person-centered therapy and CBT .    Patient has reviewed and agreed to the above plan.      MICAELA SLADE, [facility]  September 13, 2023                                                   Randi Cleary          SAFETY PLAN:  Step 1: Warning signs / cues (Thoughts, images, mood, situation, behavior) that a crisis may be developing:  Thoughts: \"I don't want to continue\" \"I am unwanted\"  Images: none  Thinking Processes: ruminating  Mood: anger  Behaviors: isolating/withdrawing , can't stop crying, not taking care of myself and not taking care of my responsibilities  Situations: small triggers, such as not being able to find something, or dropping something   Step 2: Coping strategies - Things I can do to take my mind off of my problems without contacting another person (relaxation technique, physical activity):  Distress Tolerance Strategies:  arts and crafts: drawing, play with my pet , listen to positive and upbeat music: any, change body temperature (ice pack/cold water)  and paced breathing/progressive muscle relaxation  Physical Activities: go for a walk, deep breathing and stretching   Focus on helpful thoughts:  \"You've been through this before, you can get through it again.\"  Step 3: People and social settings that provide distraction:                 Name: Carmen                            Name: D[place]en                           Name: Aleida       pool, shopping, Carmen's house, [employer]       Step 4: Remind myself of people and things that are important to me and worth living for:  Clifford Little Donna, post-COVID world, options of what could be in your " future        Step 5: When I am in crisis, I can ask these people to help me use my safety plan:                 Name: Sidney  Step 6: Making the environment safe:   go to sleep/daydream  Step 7: Professionals or agencies I can contact during a crisis:  [facility] Number: [phone]  Suicide Prevention Lifeline: 9-688-065-TALK (8255)  Crisis Text Line Service (available 24 hours a day, 7 days a week): Text [place] to 454589  Local Crisis Services: [facility] Crisis: [phone]  Adults can always access to the emPATH unit at [facility] (no phone number, utilize it like an urgent care or ER where you just show up)     Call 911 or go to my nearest emergency department.       I helped develop this safety plan and agree to use it when needed.  I have been given a copy of this plan.       Client signature _________________________________________________________________  Today s date:  11/24/2020  Adapted from Safety Plan Template 2008 Randi Poole and Robby Barba is reprinted with the express permission of the authors.  No portion of the Safety Plan Template may be reproduced without the express, written permission.  You can contact the authors at [email or web address] or [email or web address].

## 2023-11-06 ENCOUNTER — VIRTUAL VISIT (OUTPATIENT)
Dept: PSYCHOLOGY | Facility: CLINIC | Age: 68
End: 2023-11-06
Payer: MEDICARE

## 2023-11-06 DIAGNOSIS — F31.81 BIPOLAR 2 DISORDER (H): Primary | ICD-10-CM

## 2023-11-06 DIAGNOSIS — F43.9 TRAUMA AND STRESSOR-RELATED DISORDER: ICD-10-CM

## 2023-11-06 DIAGNOSIS — F41.1 GENERALIZED ANXIETY DISORDER: ICD-10-CM

## 2023-11-06 PROCEDURE — 90834 PSYTX W PT 45 MINUTES: CPT | Mod: VID | Performed by: SOCIAL WORKER

## 2023-11-06 NOTE — PROGRESS NOTES
[facility]                                     Progress Note    Patient Name: Randi Cleary  Date: 11/6/23         Service Type: Individual     Session Start Time: 1:31 PM Session End Time: 2:15 PM     Session Length: 44 minutes    Session #: 212    Attendees: Client attended alone    Service Modality:  Video Visit:      Provider verified identity through the following two step process.  Patient provided:  Patient is known previously to provider    Telemedicine Visit: The patient's condition can be safely assessed and treated via synchronous audio and visual telemedicine encounter.      Reason for Telemedicine Visit: Patient convenience (e.g. access to timely appointments / distance to available provider)    Originating Site (Patient Location): Patient's home    Distant Site (Provider Location): Provider Remote Setting- Home Office    Consent:  The patient/guardian has verbally consented to: the potential risks and benefits of telemedicine (video visit) versus in person care; bill my insurance or make self-payment for services provided; and responsibility for payment of non-covered services.     Patient would like the video invitation sent by:  My Chart    Mode of Communication:  Video Conference via [facility]    Distant Location (Provider):  Off-site    As the provider I attest to compliance with applicable laws and regulations related to telemedicine.      DATA  Extended Session (53+ minutes): No  Interactive Complexity: No  Crisis: No        Progress Since Last Session (Related to Symptoms / Goals / Homework):   Symptoms:  Patient is experiencing a lot of dysregulation lately.      Homework:  Progress made  Next session work on family stuff  Schedule shoulder surgery/see Vincent  Continue to talk with pharmacists about concerns       Episode of Care Goals: Satisfactory progress - ACTION (Actively working towards change); Intervened by reinforcing change plan / affirming steps  taken     Current / Ongoing Stressors and Concerns:   Patient is currently socially isolated. She has a conflictual relationship with her .  She is getting minimal physical activity.  She has had several surgeries.      Treatment Objective(s) Addressed in This Session:   Patient will increase frequency of engaging her in ADLs.  Patient will track and record at least 5 pleasant exchanges with . Patient will be able to identify at least 5 positive traits about her .  Patient will reduce level of depressive and anxious features as evidenced by reduction in score on her CHAVO-7 and PHQ-9 (scores of 15 and 16 at first measurment, respectively).     Intervention:   Supportive Therapy:   Shared patient's challenges recently with her . Discussed pattern of being overwhelmed with medical concerns and not being able to handle when something doesn't go right, which leads to her becoming angry and snapping. Talked about how they are managing their mental health and how this is impacting them.       The following assessments were completed by patient for this visit:  PHQ9:       9/13/2023    10:57 AM 9/18/2023     9:51 AM 9/20/2023    10:10 AM 9/27/2023     4:45 AM 10/9/2023     8:52 AM 10/16/2023     8:25 AM 10/31/2023     4:16 AM   PHQ-9 SCORE   PHQ-9 Total Score MyChart 17 (Moderately severe depression) 18 (Moderately severe depression) 18 (Moderately severe depression) 17 (Moderately severe depression) 18 (Moderately severe depression) 12 (Moderate depression) 18 (Moderately severe depression)   PHQ-9 Total Score 17 18 18 17    17 18 12 18     GAD7:       7/21/2023    10:34 AM 8/7/2023     1:03 PM 8/21/2023    11:07 AM 9/5/2023     1:30 PM 9/20/2023     9:59 AM 10/9/2023     8:55 AM 10/31/2023     4:25 AM   HCAVO-7 SCORE   Total Score 18 (severe anxiety) 18 (severe anxiety) 13 (moderate anxiety) 15 (severe anxiety) 16 (severe anxiety) 15 (severe anxiety) 15 (severe anxiety)   Total Score 18 18    18  13 15 16    16    16 15 15     PROMIS 10-Global Health (only subscores and total score):       6/22/2023    10:03 AM 7/6/2023     9:12 AM 7/21/2023    10:36 AM 7/28/2023     3:24 PM 8/7/2023     1:07 PM 9/20/2023    10:13 AM 10/31/2023     4:36 AM   PROMIS-10 Scores Only   Global Mental Health Score 5    5 5    5 5    5  6    6     5    5 6   Global Physical Health Score 7    7 8    8 7    7  8    8     9    9 8   PROMIS TOTAL - SUBSCORES 12    12 13    13 12    12  14    14     14    14 14       Information is confidential and restricted. Go to Review Flowsheets to unlock data.    Multiple values from one day are sorted in reverse-chronological order        ASSESSMENT: Current Emotional / Mental Status (status of significant symptoms):   Risk status (Self / Other harm or suicidal ideation)   Patient denies current fears or concerns for personal safety.   Patient denies current or recent suicidal ideation or behaviors.   Patient denies current or recent homicidal ideation or behaviors.   Patient denies current or recent self injurious behavior or ideation.   Patient denies other safety concerns.   Patient reports there has been no change in risk factors since their last session.     Patient reports there has been no change in protective factors since their last session.     A safety and risk management plan has been developed including: Patient consented to co-developed safety plan on 11/24/2020, updated 2/20/23.  Safety and risk management plan was reviewed.   Patient agreed to use safety plan should any safety concerns arise.  A copy was made available to the patient.     Appearance:   Appropriate    Eye Contact:   Good    Psychomotor Behavior: Normal    Attitude:   Cooperative    Orientation:   All   Speech    Rate / Production: Normal/ Responsive    Volume:  Normal    Mood:    Anxious    Affect:    Appropriate  Tearful   Thought Content:  Clear    Thought Form:  Coherent    Insight:    Good      Medication  Review:   No changes to current psychiatric medication(s)     Medication Compliance:   Yes     Changes in Health Issues:   None reported     Chemical Use Review:   Substance Use: Chemical use reviewed, no active concerns identified Nothing used since 2021.     Tobacco Use: No current tobacco use.      Diagnosis:  1. Bipolar 2 disorder (H)    2. Generalized anxiety disorder    3. Trauma and stressor-related disorder      Collateral Reports Completed:   Not Applicable    PLAN: (Patient Tasks / Therapist Tasks / Other)  Next session work on family stuff  Schedule shoulder surgery/see Nithyak  Continue to talk with pharmacists about concerns      There has been demonstrated improvement in functioning while patient has been engaged in psychotherapy/psychological service- if withdrawn the patient would deteriorate and/or relapse.     MICAELA SLADE, Health system   2023                                                        ______________________________________________________________________    Individual Treatment Plan    Patient's Name: Randi Cleary  YOB: 1955    Date of Creation: 20  Date Treatment Plan Last Reviewed/Revised: 23    DSM5 Diagnoses: 296.89 Bipolar II Disorder Depressed, 300.02 (F41.1) Generalized Anxiety Disorder, or Adjustment Disorders  309.89 (F43.8) Other Specified Trauma and Stressor Related Disorder  Psychosocial / Contextual Factors: Patient's entire family of origin has , she now has a sister-in-law and  as support.  Relationship with  is conflictual. She is recovering from surgeries  PROMIS (reviewed every 90 days): PROMIS-10 Scores  PROMIS 10-Global Health (only subscores and total score):   PROMIS-10 Scores Only 2021 3/15/2022 3/15/2022 3/15/2022 3/24/2022 2022 2022   Global Mental Health Score 6 6 6 6 8 12 12   Global Physical Health Score 9 9 9 9 8 11 10   PROMIS TOTAL - SUBSCORES 15 15 15 15 16 23 22  "      Referral / Collaboration:  Referral to another professional/service is not indicated at this time..    Anticipated number of session for this episode of care: 50  Anticipation frequency of session: Biweekly  Anticipated Duration of each session: 53 or more minutes due to intensity of trauma symptoms  Treatment plan will be reviewed in 90 days or when goals have been changed.   There has been demonstrated improvement in functioning while patient has been engaged in psychotherapy/psychological service- if withdrawn the patient would deteriorate and/or relapse.       MeasurableTreatment Goal(s) related to diagnosis / functional impairment(s)  Goal 1: Patient will \"jumpstart, getting going with the things I need to be doing around the house as far as picking up, doing things, trying to do something every day.  Also to lessen the animosity between me and my .\"    I will know I've met my goal when my shoulders are fixed and I can see.      Objective #A (Patient Action)    Patient will  increase frequency of engaging her in ADLs .  Status: Continued - Date(s): 12/10/21, 3/9/22, 6/9/22, 9/2/22, 12/1/22, 3/2/23, 6/6/23, 9/13/23    Intervention(s)  Therapist will  engage patient in CBT, specifically behavioral activation .    Objective #B  Patient will   track and record at least 5 pleasant exchanges with . Patient will be able to identify at least 5 positive traits about her  and how he relates to her .  Status: Continued - Date(s): 12/10/21, 3/9/22, 6/9/22, 9/2/22, 12/1/22, 3/2/23, 6/6/23, 9/13/23    Intervention(s)  Therapist will  teach assertiveness skills and assign homework related to relationship interactions .    Objective #C  Patient will  reduce level of depressive and anxious features as evidenced by reduction in score on her CHAVO-7 and PHQ-9 (scores of 15 and 16 at first measurment, respectively) .  Status: Continued - Date(s): 12/10/21, 3/9/22, 6/9/22, 9/2/22, 12/1/22, 3/2/23, 6/6/23, " "9/13/23    Intervention(s)  Therapist will  engage patient in person-centered therapy and CBT .    Patient has reviewed and agreed to the above plan.      MICAELA SLADE, [facility]  September 13, 2023                                                   Randi Cleary          SAFETY PLAN:  Step 1: Warning signs / cues (Thoughts, images, mood, situation, behavior) that a crisis may be developing:  Thoughts: \"I don't want to continue\" \"I am unwanted\"  Images: none  Thinking Processes: ruminating  Mood: anger  Behaviors: isolating/withdrawing , can't stop crying, not taking care of myself and not taking care of my responsibilities  Situations: small triggers, such as not being able to find something, or dropping something   Step 2: Coping strategies - Things I can do to take my mind off of my problems without contacting another person (relaxation technique, physical activity):  Distress Tolerance Strategies:  arts and crafts: drawing, play with my pet , listen to positive and upbeat music: any, change body temperature (ice pack/cold water)  and paced breathing/progressive muscle relaxation  Physical Activities: go for a walk, deep breathing and stretching   Focus on helpful thoughts:  \"You've been through this before, you can get through it again.\"  Step 3: People and social settings that provide distraction:                 Name: Carmen                            Name: Darien                           Name: Aleida       pool, shopping, Carmen's house, [employer]       Step 4: Remind myself of people and things that are important to me and worth living for:  Sidney, Clifford, Aleida, post-COVID world, options of what could be in your future        Step 5: When I am in crisis, I can ask these people to help me use my safety plan:                 Name: Sidney  Step 6: Making the environment safe:   go to sleep/daydream  Step 7: Professionals or agencies I can contact during a crisis:  [facility] Daytime Number: " [phone]  Suicide Prevention Lifeline: 6-923-169-TALK (5406)  Crisis Text Line Service (available 24 hours a day, 7 days a week): Text [place] to 606468  Local Crisis Services: [facility] Crisis: [phone]  Adults can always access to the emPATH unit at [facility] (no phone number, utilize it like an urgent care or ER where you just show up)     Call 911 or go to my nearest emergency department.       I helped develop this safety plan and agree to use it when needed.  I have been given a copy of this plan.       Client signature _________________________________________________________________  Today s date:  11/24/2020  Adapted from Safety Plan Template 2008 Randi Poole and Robby Barba is reprinted with the express permission of the authors.  No portion of the Safety Plan Template may be reproduced without the express, written permission.  You can contact the authors at [email or web address] or [email or web address].

## 2023-11-08 ENCOUNTER — VIRTUAL VISIT (OUTPATIENT)
Dept: PSYCHOLOGY | Facility: CLINIC | Age: 68
End: 2023-11-08
Payer: MEDICARE

## 2023-11-08 DIAGNOSIS — F41.1 GENERALIZED ANXIETY DISORDER: ICD-10-CM

## 2023-11-08 DIAGNOSIS — F43.9 TRAUMA AND STRESSOR-RELATED DISORDER: ICD-10-CM

## 2023-11-08 DIAGNOSIS — F31.81 BIPOLAR 2 DISORDER (H): Primary | ICD-10-CM

## 2023-11-08 PROCEDURE — 90837 PSYTX W PT 60 MINUTES: CPT | Mod: VID | Performed by: SOCIAL WORKER

## 2023-11-08 NOTE — PROGRESS NOTES
[facility]                                     Progress Note    Patient Name: Randi Cleary  Date: 11/6/23         Service Type: Individual     Session Start Time: 2:58 PM Session End Time: 3:52 PM     Session Length: 54 minutes    Session #: 213    Attendees: Client attended alone    Service Modality:  Video Visit:      Provider verified identity through the following two step process.  Patient provided:  Patient is known previously to provider    Telemedicine Visit: The patient's condition can be safely assessed and treated via synchronous audio and visual telemedicine encounter.      Reason for Telemedicine Visit: Patient convenience (e.g. access to timely appointments / distance to available provider)    Originating Site (Patient Location): Patient's home    Distant Site (Provider Location): Provider Remote Setting- Home Office    Consent:  The patient/guardian has verbally consented to: the potential risks and benefits of telemedicine (video visit) versus in person care; bill my insurance or make self-payment for services provided; and responsibility for payment of non-covered services.     Patient would like the video invitation sent by:  My Chart    Mode of Communication:  Video Conference via [employer]well    Distant Location (Provider):  Off-site    As the provider I attest to compliance with applicable laws and regulations related to telemedicine.      DATA  Extended Session (53+ minutes): PROLONGED SERVICE IN THE OUTPATIENT SETTING REQUIRING DIRECT (FACE-TO-FACE) PATIENT CONTACT BEYOND THE USUAL SERVICE:    - High distress and under complex circumstances.  See Data section for details    - Treatment protocol required additional time to complete, due to the nature of diagnosis being treated.  See Interventions section for details  Interactive Complexity: No  Crisis: No        Progress Since Last Session (Related to Symptoms / Goals / Homework):   Symptoms:  Patient is feeling  dysregulated.       Homework:  Progress made  Next session work on family stuff -done  Schedule shoulder surgery/see Vincent -not yet done  Continue to talk with pharmacists about concerns -scheduled       Episode of Care Goals: Satisfactory progress - ACTION (Actively working towards change); Intervened by reinforcing change plan / affirming steps taken     Current / Ongoing Stressors and Concerns:   Patient is currently socially isolated. She has a conflictual relationship with her .  She is getting minimal physical activity.  She has had several surgeries.      Treatment Objective(s) Addressed in This Session:   Patient will increase frequency of engaging her in ADLs.  Patient will track and record at least 5 pleasant exchanges with . Patient will be able to identify at least 5 positive traits about her .  Patient will reduce level of depressive and anxious features as evidenced by reduction in score on her CHAVO-7 and PHQ-9 (scores of 15 and 16 at first measurment, respectively).     Intervention:   Supportive Therapy:   Processed patient's experience getting her stiches taken out from her foot surgery. Talked about relationships and how she feels so alone without having family and is afraid of being completely alone. Discussed relationship with . Talked about how to start to process some of these feelings both in session and outside of session.     The following assessments were completed by patient for this visit:  PHQ9:       9/13/2023    10:57 AM 9/18/2023     9:51 AM 9/20/2023    10:10 AM 9/27/2023     4:45 AM 10/9/2023     8:52 AM 10/16/2023     8:25 AM 10/31/2023     4:16 AM   PHQ-9 SCORE   PHQ-9 Total Score MyChart 17 (Moderately severe depression) 18 (Moderately severe depression) 18 (Moderately severe depression) 17 (Moderately severe depression) 18 (Moderately severe depression) 12 (Moderate depression) 18 (Moderately severe depression)   PHQ-9 Total Score 17 18 18 17    17  18 12 18     GAD7:       7/21/2023    10:34 AM 8/7/2023     1:03 PM 8/21/2023    11:07 AM 9/5/2023     1:30 PM 9/20/2023     9:59 AM 10/9/2023     8:55 AM 10/31/2023     4:25 AM   CHAVO-7 SCORE   Total Score 18 (severe anxiety) 18 (severe anxiety) 13 (moderate anxiety) 15 (severe anxiety) 16 (severe anxiety) 15 (severe anxiety) 15 (severe anxiety)   Total Score 18 18    18 13 15 16    16    16 15 15     PROMIS 10-Global Health (only subscores and total score):       6/22/2023    10:03 AM 7/6/2023     9:12 AM 7/21/2023    10:36 AM 7/28/2023     3:24 PM 8/7/2023     1:07 PM 9/20/2023    10:13 AM 10/31/2023     4:36 AM   PROMIS-10 Scores Only   Global Mental Health Score 5    5 5    5 5    5  6    6     5    5 6   Global Physical Health Score 7    7 8    8 7    7  8    8     9    9 8   PROMIS TOTAL - SUBSCORES 12    12 13    13 12    12  14    14     14    14 14       Information is confidential and restricted. Go to Review Flowsheets to unlock data.    Multiple values from one day are sorted in reverse-chronological order        ASSESSMENT: Current Emotional / Mental Status (status of significant symptoms):   Risk status (Self / Other harm or suicidal ideation)   Patient denies current fears or concerns for personal safety.   Patient denies current or recent suicidal ideation or behaviors.   Patient denies current or recent homicidal ideation or behaviors.   Patient denies current or recent self injurious behavior or ideation.   Patient denies other safety concerns.   Patient reports there has been no change in risk factors since their last session.     Patient reports there has been no change in protective factors since their last session.     A safety and risk management plan has been developed including: Patient consented to co-developed safety plan on 11/24/2020, updated 2/20/23.  Safety and risk management plan was reviewed.   Patient agreed to use safety plan should any safety concerns arise.  A copy was made  available to the patient.     Appearance:   Appropriate    Eye Contact:   Good    Psychomotor Behavior: Normal    Attitude:   Cooperative    Orientation:   All   Speech    Rate / Production: Normal/ Responsive    Volume:  Normal    Mood:    Anxious    Affect:    Appropriate  Tearful   Thought Content:  Clear    Thought Form:  Coherent    Insight:    Good      Medication Review:   No changes to current psychiatric medication(s)     Medication Compliance:   Yes     Changes in Health Issues:   None reported     Chemical Use Review:   Substance Use: Chemical use reviewed, no active concerns identified Nothing used since 2021.     Tobacco Use: No current tobacco use.      Diagnosis:  1. Bipolar 2 disorder (H)    2. Generalized anxiety disorder    3. Trauma and stressor-related disorder        Collateral Reports Completed:   Not Applicable    PLAN: (Patient Tasks / Therapist Tasks / Other)  Journal at least once prior to next session  Schedule shoulder surgery/see [place]  Continue to talk with pharmacists about concerns      There has been demonstrated improvement in functioning while patient has been engaged in psychotherapy/psychological service- if withdrawn the patient would deteriorate and/or relapse.     MICAELA SLADE, [facility]   2023                                                        ______________________________________________________________________    Individual Treatment Plan    Patient's Name: Randi Cleary  YOB: 1955    Date of Creation: 20  Date Treatment Plan Last Reviewed/Revised: 23    DSM5 Diagnoses: 296.89 Bipolar II Disorder Depressed, 300.02 (F41.1) Generalized Anxiety Disorder, or Adjustment Disorders  309.89 (F43.8) Other Specified Trauma and Stressor Related Disorder  Psychosocial / Contextual Factors: Patient's entire family of origin has , she now has a sister-in-law and  as support.  Relationship with  is conflictual. She  "is recovering from surgeries  PROMIS (reviewed every 90 days): PROMIS-10 Scores  PROMIS 10-Global Health (only subscores and total score):   PROMIS-10 Scores Only 12/14/2021 3/15/2022 3/15/2022 3/15/2022 3/24/2022 4/1/2022 6/9/2022   Global Mental Health Score 6 6 6 6 8 12 12   Global Physical Health Score 9 9 9 9 8 11 10   PROMIS TOTAL - SUBSCORES 15 15 15 15 16 23 22       Referral / Collaboration:  Referral to another professional/service is not indicated at this time..    Anticipated number of session for this episode of care: 50  Anticipation frequency of session: Biweekly  Anticipated Duration of each session: 53 or more minutes due to intensity of trauma symptoms  Treatment plan will be reviewed in 90 days or when goals have been changed.   There has been demonstrated improvement in functioning while patient has been engaged in psychotherapy/psychological service- if withdrawn the patient would deteriorate and/or relapse.       MeasurableTreatment Goal(s) related to diagnosis / functional impairment(s)  Goal 1: Patient will \"jumpstart, getting going with the things I need to be doing around the house as far as picking up, doing things, trying to do something every day.  Also to lessen the animosity between me and my .\"    I will know I've met my goal when my shoulders are fixed and I can see.      Objective #A (Patient Action)    Patient will  increase frequency of engaging her in ADLs .  Status: Continued - Date(s): 12/10/21, 3/9/22, 6/9/22, 9/2/22, 12/1/22, 3/2/23, 6/6/23, 9/13/23    Intervention(s)  Therapist will  engage patient in CBT, specifically behavioral activation .    Objective #B  Patient will   track and record at least 5 pleasant exchanges with . Patient will be able to identify at least 5 positive traits about her  and how he relates to her .  Status: Continued - Date(s): 12/10/21, 3/9/22, 6/9/22, 9/2/22, 12/1/22, 3/2/23, 6/6/23, 9/13/23    Intervention(s)  Therapist will  " "teach assertiveness skills and assign homework related to relationship interactions .    Objective #C  Patient will  reduce level of depressive and anxious features as evidenced by reduction in score on her CHAVO-7 and PHQ-9 (scores of 15 and 16 at first measurment, respectively) .  Status: Continued - Date(s): 12/10/21, 3/9/22, 6/9/22, 9/2/22, 12/1/22, 3/2/23, 6/6/23, 9/13/23    Intervention(s)  Therapist will  engage patient in person-centered therapy and CBT .    Patient has reviewed and agreed to the above plan.      MICAELA SLADE, [facility]  September 13, 2023                                                   Randi Cleary          SAFETY PLAN:  Step 1: Warning signs / cues (Thoughts, images, mood, situation, behavior) that a crisis may be developing:  Thoughts: \"I don't want to continue\" \"I am unwanted\"  Images: none  Thinking Processes: ruminating  Mood: anger  Behaviors: isolating/withdrawing , can't stop crying, not taking care of myself and not taking care of my responsibilities  Situations: small triggers, such as not being able to find something, or dropping something   Step 2: Coping strategies - Things I can do to take my mind off of my problems without contacting another person (relaxation technique, physical activity):  Distress Tolerance Strategies:  arts and crafts: drawing, play with my pet , listen to positive and upbeat music: any, change body temperature (ice pack/cold water)  and paced breathing/progressive muscle relaxation  Physical Activities: go for a walk, deep breathing and stretching   Focus on helpful thoughts:  \"You've been through this before, you can get through it again.\"  Step 3: People and social settings that provide distraction:                 Name: Carmen                            Name: [place]                           Name: Aleida       pool, shopping, Carmen's house, [employer]       Step 4: Remind myself of people and things that are important to me and worth living for:  Sidney" Aleida Horton, post-COVID world, options of what could be in your future        Step 5: When I am in crisis, I can ask these people to help me use my safety plan:                 Name: Sidney  Step 6: Making the environment safe:   go to sleep/daydream  Step 7: Professionals or agencies I can contact during a crisis:  [facility] Daytime Number: [phone]  Suicide Prevention Lifeline: 8-552-391-POEK (8466)  Crisis Text Line Service (available 24 hours a day, 7 days a week): Text [place] to 971077  Local Crisis Services: [facility] Crisis: [phone]  Adults can always access to the emPATH unit at [facility] (no phone number, utilize it like an urgent care or ER where you just show up)     Call 911 or go to my nearest emergency department.       I helped develop this safety plan and agree to use it when needed.  I have been given a copy of this plan.       Client signature _________________________________________________________________  Today s date:  11/24/2020  Adapted from Safety Plan Template 2008 Randi Poole and Robby Barba is reprinted with the express permission of the authors.  No portion of the Safety Plan Template may be reproduced without the express, written permission.  You can contact the authors at [email or web address] or [email or web address].

## 2023-11-13 ENCOUNTER — VIRTUAL VISIT (OUTPATIENT)
Dept: PHARMACY | Facility: CLINIC | Age: 68
End: 2023-11-13
Payer: COMMERCIAL

## 2023-11-13 ENCOUNTER — VIRTUAL VISIT (OUTPATIENT)
Dept: PSYCHOLOGY | Facility: CLINIC | Age: 68
End: 2023-11-13
Payer: MEDICARE

## 2023-11-13 DIAGNOSIS — E83.110 HEREDITARY HEMOCHROMATOSIS (H): Primary | ICD-10-CM

## 2023-11-13 DIAGNOSIS — F33.0 MILD EPISODE OF RECURRENT MAJOR DEPRESSIVE DISORDER (H): Primary | ICD-10-CM

## 2023-11-13 DIAGNOSIS — F43.9 TRAUMA AND STRESSOR-RELATED DISORDER: ICD-10-CM

## 2023-11-13 DIAGNOSIS — F41.1 GENERALIZED ANXIETY DISORDER: ICD-10-CM

## 2023-11-13 DIAGNOSIS — F41.1 GAD (GENERALIZED ANXIETY DISORDER): ICD-10-CM

## 2023-11-13 DIAGNOSIS — F31.81 BIPOLAR 2 DISORDER (H): Primary | ICD-10-CM

## 2023-11-13 PROCEDURE — 99207 PR NO CHARGE LOS: CPT | Mod: VID | Performed by: PHARMACIST

## 2023-11-13 PROCEDURE — 90837 PSYTX W PT 60 MINUTES: CPT | Mod: VID | Performed by: SOCIAL WORKER

## 2023-11-13 NOTE — PROGRESS NOTES
[facility]                                     Progress Note    Patient Name: Randi Cleary  Date: 11/13/23         Service Type: Individual     Session Start Time: 4:24 PM Session End Time: 5:20 PM     Session Length: 56 minutes    Session #: 214    Attendees: Client attended alone    Service Modality:  Video Visit:      Provider verified identity through the following two step process.  Patient provided:  Patient is known previously to provider    Telemedicine Visit: The patient's condition can be safely assessed and treated via synchronous audio and visual telemedicine encounter.      Reason for Telemedicine Visit: Patient convenience (e.g. access to timely appointments / distance to available provider)    Originating Site (Patient Location): Patient's home    Distant Site (Provider Location): Provider Remote Setting- Home Office    Consent:  The patient/guardian has verbally consented to: the potential risks and benefits of telemedicine (video visit) versus in person care; bill my insurance or make self-payment for services provided; and responsibility for payment of non-covered services.     Patient would like the video invitation sent by:  My Chart    Mode of Communication:  Video Conference via Amwell    Distant Location (Provider):  Off-site    As the provider I attest to compliance with applicable laws and regulations related to telemedicine.      DATA  Extended Session (53+ minutes): PROLONGED SERVICE IN THE OUTPATIENT SETTING REQUIRING DIRECT (FACE-TO-FACE) PATIENT CONTACT BEYOND THE USUAL SERVICE:    - High distress and under complex circumstances.  See Data section for details    - Treatment protocol required additional time to complete, due to the nature of diagnosis being treated.  See Interventions section for details  Interactive Complexity: No  Crisis: No        Progress Since Last Session (Related to Symptoms / Goals / Homework):   Symptoms:  Patient reports no  significant change.       Homework:  Progress made  Journal at least once prior to next session  Schedule shoulder surgery/see Vincent  Continue to talk with pharmacists about concerns       Episode of Care Goals: Satisfactory progress - ACTION (Actively working towards change); Intervened by reinforcing change plan / affirming steps taken     Current / Ongoing Stressors and Concerns:   Patient is currently socially isolated. She has a conflictual relationship with her .  She is getting minimal physical activity.  She has had several surgeries.      Treatment Objective(s) Addressed in This Session:   Patient will increase frequency of engaging her in ADLs.  Patient will track and record at least 5 pleasant exchanges with . Patient will be able to identify at least 5 positive traits about her .  Patient will reduce level of depressive and anxious features as evidenced by reduction in score on her CHAVO-7 and PHQ-9 (scores of 15 and 16 at first measurment, respectively).     Intervention:   Supportive Therapy:   Processed family and grief around not having her own children, as well as grief around lack of connection from her family.  Then moved from discussing family to talking about physical health symptoms and how they were impacting her functioning and possible next steps. Discussed peer relationships and marriage. Agreed to focus more on family at next session. Reviewed homework and identified successes and barriers to completion.      The following assessments were completed by patient for this visit:  PHQ9:       9/13/2023    10:57 AM 9/18/2023     9:51 AM 9/20/2023    10:10 AM 9/27/2023     4:45 AM 10/9/2023     8:52 AM 10/16/2023     8:25 AM 10/31/2023     4:16 AM   PHQ-9 SCORE   PHQ-9 Total Score La 17 (Moderately severe depression) 18 (Moderately severe depression) 18 (Moderately severe depression) 17 (Moderately severe depression) 18 (Moderately severe depression) 12 (Moderate  depression) 18 (Moderately severe depression)   PHQ-9 Total Score 17 18 18 17    17 18 12 18     GAD7:       7/21/2023    10:34 AM 8/7/2023     1:03 PM 8/21/2023    11:07 AM 9/5/2023     1:30 PM 9/20/2023     9:59 AM 10/9/2023     8:55 AM 10/31/2023     4:25 AM   CHAVO-7 SCORE   Total Score 18 (severe anxiety) 18 (severe anxiety) 13 (moderate anxiety) 15 (severe anxiety) 16 (severe anxiety) 15 (severe anxiety) 15 (severe anxiety)   Total Score 18 18    18 13 15 16    16    16 15 15     PROMIS 10-Global Health (only subscores and total score):       6/22/2023    10:03 AM 7/6/2023     9:12 AM 7/21/2023    10:36 AM 7/28/2023     3:24 PM 8/7/2023     1:07 PM 9/20/2023    10:13 AM 10/31/2023     4:36 AM   PROMIS-10 Scores Only   Global Mental Health Score 5    5 5    5 5    5  6    6     5    5 6   Global Physical Health Score 7    7 8    8 7    7  8    8     9    9 8   PROMIS TOTAL - SUBSCORES 12    12 13    13 12    12  14    14     14    14 14       Information is confidential and restricted. Go to Review Flowsheets to unlock data.    Multiple values from one day are sorted in reverse-chronological order        ASSESSMENT: Current Emotional / Mental Status (status of significant symptoms):   Risk status (Self / Other harm or suicidal ideation)   Patient denies current fears or concerns for personal safety.   Patient denies current or recent suicidal ideation or behaviors.   Patient denies current or recent homicidal ideation or behaviors.   Patient denies current or recent self injurious behavior or ideation.   Patient denies other safety concerns.   Patient reports there has been no change in risk factors since their last session.     Patient reports there has been no change in protective factors since their last session.     A safety and risk management plan has been developed including: Patient consented to co-developed safety plan on 11/24/2020, updated 2/20/23.  Safety and risk management plan was reviewed.    Patient agreed to use safety plan should any safety concerns arise.  A copy was made available to the patient.     Appearance:   Appropriate    Eye Contact:   Good    Psychomotor Behavior: Normal    Attitude:   Cooperative    Orientation:   All   Speech    Rate / Production: Normal/ Responsive    Volume:  Normal    Mood:    Anxious    Affect:    Appropriate  Tearful   Thought Content:  Clear    Thought Form:  Coherent    Insight:    Good      Medication Review:   No changes to current psychiatric medication(s)     Medication Compliance:   Yes     Changes in Health Issues:   None reported     Chemical Use Review:   Substance Use: Chemical use reviewed, no active concerns identified Nothing used since August 2021.     Tobacco Use: No current tobacco use.      Diagnosis:  1. Bipolar 2 disorder (H)    2. Generalized anxiety disorder    3. Trauma and stressor-related disorder          Collateral Reports Completed:   Not Applicable    PLAN: (Patient Tasks / Therapist Tasks / Other)  Check in on family at next session  Get back to the pool  Get the bedrooms set  Journal at least once prior to next session  Schedule shoulder surgery/see Lervick      There has been demonstrated improvement in functioning while patient has been engaged in psychotherapy/psychological service- if withdrawn the patient would deteriorate and/or relapse.     MICAELA SLADE, [facility]   November 13, 2023                                                        ______________________________________________________________________    Individual Treatment Plan    Patient's Name: Randi Cleary  YOB: 1955    Date of Creation: 6/30/20  Date Treatment Plan Last Reviewed/Revised: 9/13/23    DSM5 Diagnoses: 296.89 Bipolar II Disorder Depressed, 300.02 (F41.1) Generalized Anxiety Disorder, or Adjustment Disorders  309.89 (F43.8) Other Specified Trauma and Stressor Related Disorder  Psychosocial / Contextual Factors: Patient's entire family  "of origin has , she now has a sister-in-law and  as support.  Relationship with  is conflictual. She is recovering from surgeries  PROMIS (reviewed every 90 days): PROMIS-10 Scores  PROMIS 10-Global Health (only subscores and total score):   PROMIS-10 Scores Only 2021 3/15/2022 3/15/2022 3/15/2022 3/24/2022 2022 2022   Global Mental Health Score 6 6 6 6 8 12 12   Global Physical Health Score 9 9 9 9 8 11 10   PROMIS TOTAL - SUBSCORES 15 15 15 15 16 23 22       Referral / Collaboration:  Referral to another professional/service is not indicated at this time..    Anticipated number of session for this episode of care: 50  Anticipation frequency of session: Biweekly  Anticipated Duration of each session: 53 or more minutes due to intensity of trauma symptoms  Treatment plan will be reviewed in 90 days or when goals have been changed.   There has been demonstrated improvement in functioning while patient has been engaged in psychotherapy/psychological service- if withdrawn the patient would deteriorate and/or relapse.       MeasurableTreatment Goal(s) related to diagnosis / functional impairment(s)  Goal 1: Patient will \"jumpstart, getting going with the things I need to be doing around the house as far as picking up, doing things, trying to do something every day.  Also to lessen the animosity between me and my .\"    I will know I've met my goal when my shoulders are fixed and I can see.      Objective #A (Patient Action)    Patient will  increase frequency of engaging her in ADLs .  Status: Continued - Date(s): 12/10/21, 3/9/22, 22, 22, 22, 3/2/23, 23, 23    Intervention(s)  Therapist will  engage patient in CBT, specifically behavioral activation .    Objective #B  Patient will   track and record at least 5 pleasant exchanges with . Patient will be able to identify at least 5 positive traits about her  and how he relates to her .  Status: " "Continued - Date(s): 12/10/21, 3/9/22, 6/9/22, 9/2/22, 12/1/22, 3/2/23, 6/6/23, 9/13/23    Intervention(s)  Therapist will  teach assertiveness skills and assign homework related to relationship interactions .    Objective #C  Patient will  reduce level of depressive and anxious features as evidenced by reduction in score on her CHAVO-7 and PHQ-9 (scores of 15 and 16 at first measurment, respectively) .  Status: Continued - Date(s): 12/10/21, 3/9/22, 6/9/22, 9/2/22, 12/1/22, 3/2/23, 6/6/23, 9/13/23    Intervention(s)  Therapist will  engage patient in person-centered therapy and CBT .    Patient has reviewed and agreed to the above plan.      MICAELA SLADE, [facility]  September 13, 2023                                                   Randi Cleary          SAFETY PLAN:  Step 1: Warning signs / cues (Thoughts, images, mood, situation, behavior) that a crisis may be developing:  Thoughts: \"I don't want to continue\" \"I am unwanted\"  Images: none  Thinking Processes: ruminating  Mood: anger  Behaviors: isolating/withdrawing , can't stop crying, not taking care of myself and not taking care of my responsibilities  Situations: small triggers, such as not being able to find something, or dropping something   Step 2: Coping strategies - Things I can do to take my mind off of my problems without contacting another person (relaxation technique, physical activity):  Distress Tolerance Strategies:  arts and crafts: drawing, play with my pet , listen to positive and upbeat music: any, change body temperature (ice pack/cold water)  and paced breathing/progressive muscle relaxation  Physical Activities: go for a walk, deep breathing and stretching   Focus on helpful thoughts:  \"You've been through this before, you can get through it again.\"  Step 3: People and social settings that provide distraction:                 Name: Carmen                            Name: Darien                           Name: Aleida       pool, shopping, " Carmen's house, [employer]       Step 4: Remind myself of people and things that are important to me and worth living for:  Sidney, Clifford, Aleida, post-COVID world, options of what could be in your future        Step 5: When I am in crisis, I can ask these people to help me use my safety plan:                 Name: Sidney  Step 6: Making the environment safe:   go to sleep/daydream  Step 7: Professionals or agencies I can contact during a crisis:  [facility] Daytime Number: [phone]  Suicide Prevention Lifeline: 9-257-346-TALK (8255)  Crisis Text Line Service (available 24 hours a day, 7 days a week): Text [place] to 384094  Local Crisis Services: [facility] Crisis: [phone]  Adults can always access to the emPATH unit at [facility] (no phone number, utilize it like an urgent care or ER where you just show up)     Call 911 or go to my nearest emergency department.       I helped develop this safety plan and agree to use it when needed.  I have been given a copy of this plan.       Client signature _________________________________________________________________  Today s date:  11/24/2020  Adapted from Safety Plan Template 2008 Randi Poole and Robby Barba is reprinted with the express permission of the authors.  No portion of the Safety Plan Template may be reproduced without the express, written permission.  You can contact the authors at [email or web address] or [email or web address].

## 2023-11-13 NOTE — PATIENT INSTRUCTIONS
"Recommendations from today's MTM visit:                                                    MTM (medication therapy management) is a service provided by a clinical pharmacist designed to help you get the most of out of your medicines.   Today we reviewed what your medicines are for, how to know if they are working, that your medicines are safe and how to make your medicine regimen as easy as possible.      -Continue current medication.  -Talk with PCP about allopurinol.    Follow-up: I will follow up on next steps in psychiatry clinic    It was great speaking with you today.  I value your experience and would be very thankful for your time in providing feedback in our clinic survey. In the next few days, you may receive an email or text message from [employer] with a link to a survey related to your  clinical pharmacist.\"     To schedule another MTM appointment, please call the clinic directly or you may call the MTM scheduling line at [phone] or toll-free at [phone].     My Clinical Pharmacist's contact information:                                                      Please feel free to contact me with any questions or concerns you have.      Felicia Hicks, PharmD  Medication Therapy Management Pharmacist  [facility]    "

## 2023-11-13 NOTE — Clinical Note
"Hello- You saw this patient on 9/27 with follow up plan as \"pending discussion with CAT team.\"  Do you know what the plan is for her? Looks like she'll be seeing TRD, but won't see the MD until the end of January.  Reviewed past med trials today and reviewed all her meds 2 weeks ago. I told her she could be expecting some follow up on next steps. Thanks, Felicia"

## 2023-11-14 ENCOUNTER — PATIENT OUTREACH (OUTPATIENT)
Dept: CARE COORDINATION | Facility: CLINIC | Age: 68
End: 2023-11-14

## 2023-11-14 ENCOUNTER — PATIENT OUTREACH (OUTPATIENT)
Dept: NURSING | Facility: CLINIC | Age: 68
End: 2023-11-14
Payer: MEDICARE

## 2023-11-14 NOTE — PROGRESS NOTES
Clinic Care Coordination Contact  Follow Up Progress Note      Assessment: CCC RN spoke with patient today to follow up on goal and to discuss any needs to concerns. Patient reported he started taking allopurinol prescribed by her PCP, and said she has found this to be beneficial. She stated she decided to start taking this medication after having difficulty using a scissors. She reported she experienced severe joint pain in her hand, but this has improved with the allopurinol.   With regards to her mental health, patient stated she continues to attend weekly appointments with her therapist, and feels these appointments are beneficial. Patient stated she discussed the possibility of going medications for her depression with MTM yesterday, but stated she is still contemplating related to past experiences on antidepressants. Patient plans to start swimming at the [employer] again, which she said help elevate her mood.   Assisted patient with scheduling a follow up with PCP for 11/16/23 related concerns about uric acid and thyroid levels.   Patient denied any other needs or concerns.                 Care Gaps:    Health Maintenance Due   Topic Date Due    HF ACTION PLAN  Never done    DIABETIC FOOT EXAM  Never done    ADVANCE CARE PLANNING  Never done    COPD ACTION PLAN  Never done    ZOSTER IMMUNIZATION (1 of 2) Never done    RSV VACCINE (Pregnancy & 60+) (1 - 1-dose 60+ series) Never done    MEDICARE ANNUAL WELLNESS VISIT  Never done    EYE EXAM  12/07/2021    COVID-19 Vaccine (7 - 2023-24 season) 09/08/2023       Scheduled with PCP on 11/16/23      Care Plans  Care Plan: Mental Health       Problem: Mental Health Symptoms Need Improvement       Goal: I would like to feel as though my mental health has improved.       Start Date: 9/25/2023 Expected End Date: 9/24/2024    Recent Progress: 10%    Priority: High    Note:     Barriers: history of anxiety, bipolar disorder, trauma related disorder  Strengths:  strong advocate for herself  Patient expressed understanding of goal: yes  Action steps to achieve this goal:  1. I will continue to attend all appointments with my therapist Mary Kay Gomez and with my new psychiatric provide.   2. I will continue to attend all appointments with the [facility] partial-hospitalization program.   3. I will continue to the use the skills I have learned through therapy and the partial hospitalization program.   4. I will take my medications as directed.   5. I will report progress towards this goal at schedule outreach telephone calls from my Care Coordinator.   Disucssed on 11/14/23                              Intervention/Education provided during outreach: Discussed the importance of taking her medications as directed. Encouraged her to attend upcoming appointments. Encouraged her to contact Care Coordination for any other needs or concerns.     Outreach Frequency: monthly, more frequently as needed    Plan: CCC RN will continue to monitor, support patient with current goal and will be available to assist as nursing needs arise.     Care Coordinator will follow up in one month.

## 2023-11-16 ENCOUNTER — VIRTUAL VISIT (OUTPATIENT)
Dept: INTERNAL MEDICINE | Facility: CLINIC | Age: 68
End: 2023-11-16
Payer: MEDICARE

## 2023-11-16 DIAGNOSIS — M10.9 URIC ACID ARTHROPATHY: ICD-10-CM

## 2023-11-16 DIAGNOSIS — I27.20 PULMONARY HYPERTENSION (H): ICD-10-CM

## 2023-11-16 DIAGNOSIS — R30.0 DYSURIA: ICD-10-CM

## 2023-11-16 DIAGNOSIS — E89.0 POSTABLATIVE HYPOTHYROIDISM: ICD-10-CM

## 2023-11-16 DIAGNOSIS — M19.071 OSTEOARTHRITIS OF RIGHT ANKLE AND FOOT: ICD-10-CM

## 2023-11-16 DIAGNOSIS — K44.9 HIATAL HERNIA: ICD-10-CM

## 2023-11-16 DIAGNOSIS — F31.81 BIPOLAR 2 DISORDER (H): ICD-10-CM

## 2023-11-16 DIAGNOSIS — E11.9 TYPE 2 DIABETES MELLITUS WITHOUT COMPLICATION, WITHOUT LONG-TERM CURRENT USE OF INSULIN (H): Primary | ICD-10-CM

## 2023-11-16 DIAGNOSIS — J44.9 CHRONIC OBSTRUCTIVE PULMONARY DISEASE, UNSPECIFIED COPD TYPE (H): ICD-10-CM

## 2023-11-16 DIAGNOSIS — E83.110 HEREDITARY HEMOCHROMATOSIS (H): ICD-10-CM

## 2023-11-16 DIAGNOSIS — F41.1 GENERALIZED ANXIETY DISORDER: ICD-10-CM

## 2023-11-16 DIAGNOSIS — G47.33 OSA (OBSTRUCTIVE SLEEP APNEA): ICD-10-CM

## 2023-11-16 PROCEDURE — 99215 OFFICE O/P EST HI 40 MIN: CPT | Mod: 95 | Performed by: INTERNAL MEDICINE

## 2023-11-16 RX ORDER — POTASSIUM CHLORIDE 1500 MG/1
20 TABLET, EXTENDED RELEASE ORAL DAILY
Qty: 90 TABLET | Refills: 3 | Status: SHIPPED | OUTPATIENT
Start: 2023-11-16 | End: 2024-07-25

## 2023-11-16 RX ORDER — OXYCODONE HYDROCHLORIDE 5 MG/1
5 TABLET ORAL 4 TIMES DAILY
COMMUNITY
Start: 2023-11-16 | End: 2023-11-30

## 2023-11-16 RX ORDER — ETHACRYNIC ACID 25 MG/1
TABLET ORAL
Qty: 45 TABLET | Refills: 3 | Status: SHIPPED | OUTPATIENT
Start: 2023-11-16 | End: 2024-06-24

## 2023-11-16 RX ORDER — METFORMIN HCL 500 MG
500 TABLET, EXTENDED RELEASE 24 HR ORAL
COMMUNITY
End: 2023-11-16

## 2023-11-16 RX ORDER — ALLOPURINOL 300 MG/1
300 TABLET ORAL DAILY
Qty: 90 TABLET | Refills: 3 | Status: SHIPPED | OUTPATIENT
Start: 2023-11-16 | End: 2024-07-25

## 2023-11-16 RX ORDER — HYDROCODONE BITARTRATE AND ACETAMINOPHEN 5; 325 MG/1; MG/1
TABLET ORAL
COMMUNITY
Start: 2023-10-23 | End: 2023-11-16

## 2023-11-16 RX ORDER — ONDANSETRON 4 MG/1
TABLET, ORALLY DISINTEGRATING ORAL
Status: ON HOLD | COMMUNITY
Start: 2023-10-23 | End: 2024-05-22

## 2023-11-16 RX ORDER — ALBUTEROL SULFATE 90 UG/1
2 AEROSOL, METERED RESPIRATORY (INHALATION) EVERY 6 HOURS PRN
COMMUNITY
Start: 2023-11-16 | End: 2024-03-19

## 2023-11-16 NOTE — COMMUNITY RESOURCES LIST (ENGLISH)
11/16/2023   [facility]  N/A  For questions about this resource list or additional care needs, please contact your primary care clinic or care manager.  Phone: [phone]   Email: N/A   Address: [address] [place] [address]   Hours: N/A        Financial Stability       Rent and mortgage payment assistance  1  [facility] Distance: 2.03 miles      [address] [place] [address]  Language: English, Eritrean  Hours: Mon 9:30 AM - 11:30 AM , Tue 1:30 PM - 7:30 PM , Thu 9:00 AM - 7:00 PM , Fri 9:30 AM - 11:30 AM   Phone: (974) 580-6502 Email: info@[facility].it Website: http://[facility] CenterRhone Apparel.[employer]Edu/     2  [facility] Distance: 7.85 miles      In-Person, Phone/Virtual   [address] [place] [address]  Language: English, Eritrean  Hours: Mon - Thu 8:00 AM - 4:00 PM  Fees: Free   Phone: (802) 619-3749 Email: center@[employer].org Website: https://www.[facility].org          Transportation       Free or low-cost transportation  3  [facility]  Office [facility] - Gas vouchers and transportation assistance - Free or low-cost transportation Distance: 10.95 miles      In-Person, Phone/Virtual   [address] [place] [address]  Language: English  Hours: Mon - Fri 8:00 AM - 12:00 PM , Mon - Fri 1:00 PM - 4:00 PM  Fees: Free   Phone: (799) 267-2106 Email: [email or web address] Hospital[facility]umi[facility]ra.org Website: https://[facility].org/[facility]/social-services-office-[place]/     [address] [place] [address] Bus Loop - Free or low-cost transportation Distance: 11.92 miles      In-Person   [address] [place] [address]  Language: English  Hours: Mon - Fri 9:00 AM - 5:00 PM  Fees: Free   Phone: (895) 856-2334 Email: info@[employer].org Website: https://www.[employer].org/     Transportation to medical appointments  5  [facility] Distance: 2.33 miles       In-Person   [address] [place] [address]  Language: English  Hours: Mon - Fri 9:00 AM - 4:00 PM  Fees: Free   Phone: (878) 150-4327 Email: contact@[facility] Center[facility].org Website: http://[employer].org       [employer] [facility] Transit Distance: 14 miles      In-Person   [address] [place] [address]  Language: English  Hours: Mon - Fri 6:00 AM - 8:00 PM , Sat 8:00 AM - 6:00 PM , Sun 8:00 AM - 3:00 PM  Fees: Self Pay   Phone: (567) 686-8961 Email: [email or web address] Website: [email or web address]?Name=[place]          Important Numbers & Websites       Emergency Services   911  [facility] Services   311  Poison Control   (642) 337-6010  Suicide Prevention Lifeline   (830) 106-8782 (TALK)  Child Abuse Hotline   (197) 910-4632 (4-A-Child)  Sexual Assault Hotline   (274) 256-7074 (HOPE)  National Runaway Safeline   (714) 470-6163 (RUNAWAY)  All-Options Talkline   (456) 269-3258  Substance Abuse Referral   (421) 141-5305 (HELP)

## 2023-11-16 NOTE — PROGRESS NOTES
Winnie is a 68 year old who is being evaluated via a billable video visit.      How would you like to obtain your AVS? [employer]  If the video visit is dropped, the invitation should be resent by: Other e-mail: Prismatic  Will anyone else be joining your video visit? No  {If patient encounters technical issues they should call [phone] :033510}        {PROVIDER CHARTING PREFERENCE:113004}    Subjective   Winnie is a 68 year old, presenting for the following health issues:  Consult (Uric acid levels and thyroid concerns.  She is requesting blood work.)      11/16/2023    11:07 AM   Additional Questions   Roomed by Taylor GOODWIN       History of Present Illness       Reason for visit:  Test results    She eats 2-3 servings of fruits and vegetables daily.She consumes 0 sweetened beverage(s) daily.She exercises with enough effort to increase her heart rate 9 or less minutes per day.  She exercises with enough effort to increase her heart rate 3 or less days per week.   She is taking medications regularly.       {SUPERLIST (Optional):604589}  {additonal problems for provider to add (Optional):199786}      Review of Systems   {ROS COMP (Optional):391648}      Objective    Vitals - Patient Reported  Systolic (Patient Reported):  (does not monitor)      Vitals:  No vitals were obtained today due to virtual visit.    Physical Exam   {video visit exam brief selected:234139}    {Diagnostic Test Results (Optional):595245}    {AMBULATORY ATTESTATION (Optional):033561}        Video-Visit Details    Type of service:  Video Visit   Video Start Time: {video visit start/end time for provider to select:065477}  Video End Time:{video visit start/end time for provider to select:289480}    Originating Location (pt. Location): Home  {PROVIDER LOCATION On-site should be selected for visits conducted from your clinic location or adjoining [facility] hospital, academic office, or other nearby [facility] building. Off-site should be selected for all other  provider locations, including home:[address]}  Distant Location (provider location):  On-site  Platform used for Video Visit: Tara

## 2023-11-16 NOTE — PROGRESS NOTES
"Randi is a 68 year old female contacting the clinic today via video, who will use the platform: [email or web address] for the visit.  Phone # for [employer], or if Amwell drops:   Telephone Information:   Mobile [phone]          ASSESSMENT and PLAN:  1. Type 2 diabetes mellitus without complication, without long-term current use of insulin (H)  Stable on no metformin.  Medicine list carefully adjusted    2. Bipolar 2 disorder (H)  Untreated.  Clear evidence of disordered thinking and tangential thinking.  Await ADD testing and psychiatry evaluation in January    3. Generalized anxiety disorder  Untreated with history of \"serotonin syndrome\"    4. Postablative hypothyroidism  Discussed.  Okay to keep TSH slightly below normal as long as she feels well    5. Chronic obstructive pulmonary disease, unspecified COPD type (H)  Clarify current dose of medicines  - albuterol (VENTOLIN HFA) 108 (90 Base) MCG/ACT inhaler; Inhale 2 puffs into the lungs every 6 hours as needed for shortness of breath, wheezing or cough  - Fluticasone-Umeclidin-Vilanterol (TRELEGY ELLIPTA) 200-62.5-25 MCG/ACT oral inhaler; Inhale 1 puff into the lungs daily  Dispense: 1 each; Refill: 11    6. Pulmonary hypertension (H)  Clarify current dose of medications  - ethacrynic acid (EDECRIN) 25 MG tablet; Half pill every day  Dispense: 45 tablet; Refill: 3  - KLOR-CON 20 MEQ CR tablet; Take 1 tablet (20 mEq) by mouth daily  Dispense: 90 tablet; Refill: 3    7. Osteoarthritis of right ankle and foot  Noted.  Disagree with chronic narcotics especially with her trouble with focus  - oxyCODONE (ROXICODONE) 5 MG tablet; Take 1 tablet (5 mg) by mouth 4 times daily Dispense 11/13/23 for start 11/14/23    8. Hereditary hemochromatosis (H24)  Reviewed MRI of liver.  Undergoing phlebotomies    9. Uric acid arthropathy  Discussed again how uric acid can contribute to arthritis pain and recommend to continue  - allopurinol (ZYLOPRIM) 300 MG tablet; Take 1 tablet (300 mg) by " mouth daily  Dispense: 90 tablet; Refill: 3    10. Hiatal hernia  Failed trial of famotidine.  Resume omeprazole  - omeprazole (PRILOSEC) 20 MG DR capsule; Take 1 capsule (20 mg) by mouth daily  Dispense: 90 capsule; Refill: 3    11. Dysuria  Rule out bladder infection  - UA Macroscopic with reflex to Microscopic and Culture; Future    12. KYAW (obstructive sleep apnea)  Encourage compliance especially with disordered thinking       Patient Instructions   Continue allopurinol 300 mg daily    Medicine list corrected    No metformin    Discontinue famotidine    Omeprazole 20 mg daily-resume    Potassium just once daily    Recommend testing for ADHD-proceeding    Continue Synthroid 150 mcg    Urinalysis    TSH when convenient    Continue phlebotomies for hemochromatosis            Return in about 3 months (around 2/16/2024) for using a video visit.       CHIEF COMPLAINT:  Chief Complaint   Patient presents with    Consult     Uric acid levels and thyroid concerns.  She is requesting blood work.           11/16/2023    11:07 AM   Additional Questions   Roomed by Taylor GOODWIN       HISTORY OF PRESENT ILLNESS:  Randi is a 68 year old female contacting the clinic today via video for multiple questions.  She has tremendous difficulty focusing focusing, asking a clear question, and providing a clear history.  I patiently waited for approximately 25 minutes while she stumbled through questions regarding her finger, allopurinol, uric acid, frequent urination, thyroid, and heartburn.  She reports that she had a serotonin syndrome many years ago.  She is not certain whether she has bipolar although it is listed on her chart.  Is undergoing psychological testing in January.  Has never tried a stimulant.  Is still not compliant with sleep apnea.  Discussed that her narcotics, medical cannabis, and untreated sleep apnea can certainly worsen her ability to focus    Initiation of allopurinol has helped some of her foot and ankle and  "hand pain.  She injured her finger and resumed allopurinol    Urinary frequency increased.  Complains of increased volume but weight remains the same.  Weight stable over the last year    Questions regarding thyroid with history of hyperthyroidism and ablation    Omeprazole changed to famotidine a few months ago, now with worsening heartburn      History of Present Illness       Reason for visit:  Test results    She eats 2-3 servings of fruits and vegetables daily.She consumes 0 sweetened beverage(s) daily.She exercises with enough effort to increase her heart rate 9 or less minutes per day.  She exercises with enough effort to increase her heart rate 3 or less days per week.   She is taking medications regularly.      REVIEW OF SYSTEMS:   No peripheral edema    PFSH:  Social History     Social History Narrative    Not on file       TOBACCO USE:  History   Smoking Status    Former    Packs/day: 2.50    Years: 20.00    Types: Cigarettes    Start date: 5/1/1971    Quit date: 6/29/2000   Smokeless Tobacco    Never       VITALS:  There were no vitals filed for this visit.  LMP  (LMP Unknown)  Estimated body mass index is 35.96 kg/m  as calculated from the following:    Height as of 10/19/23: 1.676 m (5' 6\").    Weight as of 10/19/23: 101.1 kg (222 lb 12.8 oz).    PHYSICAL EXAM:  (observations via Video)  Tangential.  Anxious.  Wringing her hands.  Checking her lists    MEDICATIONS:   Current Outpatient Medications   Medication Sig Dispense Refill    albuterol (VENTOLIN HFA) 108 (90 Base) MCG/ACT inhaler Inhale 2 puffs into the lungs every 6 hours as needed for shortness of breath, wheezing or cough      allopurinol (ZYLOPRIM) 300 MG tablet Take 1 tablet (300 mg) by mouth daily 90 tablet 3    benzonatate (TESSALON) 200 MG capsule Take 1 capsule (200 mg) by mouth 3 times daily as needed for cough 30 capsule 1    Cyanocobalamin (VITAMIN B-12) 5000 MCG SUBL Place 2-3 sprays under the tongue daily Unknown dose. 2 or 3 " sprays/day      ethacrynic acid (EDECRIN) 25 MG tablet Half pill every day 45 tablet 3    Fluticasone-Umeclidin-Vilanterol (TRELEGY ELLIPTA) 200-62.5-25 MCG/ACT oral inhaler Inhale 1 puff into the lungs daily 1 each 11    guaiFENesin (MUCINEX) 600 MG 12 hr tablet Take 1,200 mg by mouth 2 times daily as needed for congestion      KLOR-CON 20 MEQ CR tablet Take 1 tablet (20 mEq) by mouth daily 90 tablet 3    MAGNESIUM CITRATE PO Take 235 mg by mouth at bedtime      medical cannabis (Patient's own supply) See Admin Instructions (The purpose of this order is to document that the patient reports taking medical cannabis.  This is not a prescription, and is not used to certify that the patient has a qualifying medical condition.)  Flower      omeprazole (PRILOSEC) 20 MG DR capsule Take 1 capsule (20 mg) by mouth daily 90 capsule 3    ondansetron (ZOFRAN ODT) 4 MG ODT tab DISSOLVE 1 TABLET UNDER THE TONGUE EVERY 6 HOURS AS NEEDED FOR NAUSEA*      oxyCODONE (ROXICODONE) 5 MG tablet Take 1 tablet (5 mg) by mouth 4 times daily Dispense 11/13/23 for start 11/14/23      rOPINIRole (REQUIP) 2 MG tablet One tab 3 pm, one bedtime, and one during night on waking 90 tablet 3    STATIN NOT PRESCRIBED (INTENTIONAL) Please choose reason not prescribed from choices below.      SYNTHROID 150 MCG tablet Take 1 tablet (150 mcg) by mouth daily 90 tablet 4    vitamin C (ASCORBIC ACID) 1000 MG TABS Take 1,000 mg by mouth daily      vitamin D3 (CHOLECALCIFEROL) 250 mcg (56319 units) capsule Take 1 capsule by mouth once a week         Outside Notes summarized:   Labs, x-rays, cardiology, GI tests reviewed: Elevated hemoglobin.  Recently controlled diabetes.  Slightly over replaced thyroid  Recent Labs   Lab Test 10/17/23  1410 09/25/23  1018 07/25/23  1643 07/14/23  1110 07/05/23  1414 06/07/22  0735 02/23/22  1339   HGB 18.3* 17.6* 16.6*  --   --    < > 15.4   WBC 8.1 10.3 7.6  --   --    < > 6.0     --   --   --  142   < > 139    POTASSIUM 4.1  --   --   --  4.2   < > 3.8   CR 0.67 0.69  --   --  0.65   < > 0.61   A1C 5.7*  --   --  5.8*  --    < > 5.5   URIC 3.4 6.7*  --   --   --   --   --    B12  --   --  987  --   --   --  373   TSH 0.24*  --   --   --  3.18   < > 1.14   VITDT  --   --  38  --   --   --  43   SED 8  --   --   --   --   --   --     < > = values in this interval not displayed.     Lab Results   Component Value Date    SBPWE14FLO Negative 12/17/2021    NFCLH45OWW POSITIVE 06/04/2021     Lab Results   Component Value Date    CHOL 240 07/05/2023    CHOL 180 05/18/2021     New orders:   Orders Placed This Encounter   Procedures    UA Macroscopic with reflex to Microscopic and Culture       Independent review of:     Laith Constantino MD  [facility]    Video-Visit Details  Type of service:  Video Visit  Patient has given verbal consent to a Video visit?  Yes  How would you like to obtain your AVS?  MyChart  Will anyone else be joining your video visit, giving supplemental history? No    Originating location (pt location): Home, sitting in her kitchen    Distant Location (provider location):  Off-site    Video Start Time: 11:50 AM  Video End time:  12:48 PM  Face to face plus orders: 58 minutes  Documentation time:  3 minutes     The visit lasted a total of 61 minutes

## 2023-11-16 NOTE — PATIENT INSTRUCTIONS
Continue allopurinol 300 mg daily    Medicine list corrected    No metformin    Discontinue famotidine    Omeprazole 20 mg daily-resume    Potassium just once daily    Recommend testing for ADHD-proceeding    Continue Synthroid 150 mcg    Urinalysis    TSH when convenient    Continue phlebotomies for hemochromatosis

## 2023-11-17 ENCOUNTER — VIRTUAL VISIT (OUTPATIENT)
Dept: PSYCHOLOGY | Facility: CLINIC | Age: 68
End: 2023-11-17
Payer: MEDICARE

## 2023-11-17 DIAGNOSIS — F41.1 GENERALIZED ANXIETY DISORDER: ICD-10-CM

## 2023-11-17 DIAGNOSIS — F31.81 BIPOLAR 2 DISORDER (H): Primary | ICD-10-CM

## 2023-11-17 DIAGNOSIS — F43.9 TRAUMA AND STRESSOR-RELATED DISORDER: ICD-10-CM

## 2023-11-17 PROCEDURE — 90837 PSYTX W PT 60 MINUTES: CPT | Mod: VID | Performed by: SOCIAL WORKER

## 2023-11-17 NOTE — PROGRESS NOTES
[facility]                                     Progress Note    Patient Name: Randi Cleary  Date: 11/17/23         Service Type: Individual     Session Start Time: 12:31 PM Session End Time: 1:25 PM     Session Length: 54 minutes    Session #: 215    Attendees: Client attended alone    Service Modality:  Video Visit:      Provider verified identity through the following two step process.  Patient provided:  Patient is known previously to provider    Telemedicine Visit: The patient's condition can be safely assessed and treated via synchronous audio and visual telemedicine encounter.      Reason for Telemedicine Visit: Patient convenience (e.g. access to timely appointments / distance to available provider)    Originating Site (Patient Location): Patient's home    Distant Site (Provider Location): Provider Remote Setting- Home Office    Consent:  The patient/guardian has verbally consented to: the potential risks and benefits of telemedicine (video visit) versus in person care; bill my insurance or make self-payment for services provided; and responsibility for payment of non-covered services.     Patient would like the video invitation sent by:  My Chart    Mode of Communication:  Video Conference via Amwell    Distant Location (Provider):  Off-site    As the provider I attest to compliance with applicable laws and regulations related to telemedicine.      DATA  Extended Session (53+ minutes): PROLONGED SERVICE IN THE OUTPATIENT SETTING REQUIRING DIRECT (FACE-TO-FACE) PATIENT CONTACT BEYOND THE USUAL SERVICE:    - High distress and under complex circumstances.  See Data section for details    - Treatment protocol required additional time to complete, due to the nature of diagnosis being treated.  See Interventions section for details  Interactive Complexity: No  Crisis: No        Progress Since Last Session (Related to Symptoms / Goals / Homework):   Symptoms:  Patient reports ongoing  concerns with her health that she feels are not being met, so she is overwhelmed.       Homework:  Progress made  Check in on family at next session  Get back to the pool  Get the bedrooms set  Journal at least once prior to next session  Schedule shoulder surgery/see Vincent     Episode of Care Goals: Satisfactory progress - ACTION (Actively working towards change); Intervened by reinforcing change plan / affirming steps taken     Current / Ongoing Stressors and Concerns:   Patient is currently socially isolated. She has a conflictual relationship with her .  She is getting minimal physical activity.  She has had several surgeries.      Treatment Objective(s) Addressed in This Session:   Patient will increase frequency of engaging her in ADLs.  Patient will track and record at least 5 pleasant exchanges with . Patient will be able to identify at least 5 positive traits about her .  Patient will reduce level of depressive and anxious features as evidenced by reduction in score on her CHAVO-7 and PHQ-9 (scores of 15 and 16 at first measurment, respectively).     Intervention:   Supportive Therapy:   Processed ongoing care concerns with her provider and how she's planning to move forward. Looked at how this impacting her emotionally. Helped patient recognize her next steps. Looked at how her distress is impacting her relationships.     The following assessments were completed by patient for this visit:  PHQ9:       9/13/2023    10:57 AM 9/18/2023     9:51 AM 9/20/2023    10:10 AM 9/27/2023     4:45 AM 10/9/2023     8:52 AM 10/16/2023     8:25 AM 10/31/2023     4:16 AM   PHQ-9 SCORE   PHQ-9 Total Score MyChart 17 (Moderately severe depression) 18 (Moderately severe depression) 18 (Moderately severe depression) 17 (Moderately severe depression) 18 (Moderately severe depression) 12 (Moderate depression) 18 (Moderately severe depression)   PHQ-9 Total Score 17 18 18 17    17 18 12 18     GAD7:        7/21/2023    10:34 AM 8/7/2023     1:03 PM 8/21/2023    11:07 AM 9/5/2023     1:30 PM 9/20/2023     9:59 AM 10/9/2023     8:55 AM 10/31/2023     4:25 AM   CHAVO-7 SCORE   Total Score 18 (severe anxiety) 18 (severe anxiety) 13 (moderate anxiety) 15 (severe anxiety) 16 (severe anxiety) 15 (severe anxiety) 15 (severe anxiety)   Total Score 18 18    18 13 15 16    16    16 15 15     PROMIS 10-Global Health (only subscores and total score):       6/22/2023    10:03 AM 7/6/2023     9:12 AM 7/21/2023    10:36 AM 7/28/2023     3:24 PM 8/7/2023     1:07 PM 9/20/2023    10:13 AM 10/31/2023     4:36 AM   PROMIS-10 Scores Only   Global Mental Health Score 5    5 5    5 5    5  6    6     5    5 6   Global Physical Health Score 7    7 8    8 7    7  8    8     9    9 8   PROMIS TOTAL - SUBSCORES 12    12 13    13 12    12  14    14     14    14 14       Information is confidential and restricted. Go to Review Flowsheets to unlock data.    Multiple values from one day are sorted in reverse-chronological order        ASSESSMENT: Current Emotional / Mental Status (status of significant symptoms):   Risk status (Self / Other harm or suicidal ideation)   Patient denies current fears or concerns for personal safety.   Patient denies current or recent suicidal ideation or behaviors.   Patient denies current or recent homicidal ideation or behaviors.   Patient denies current or recent self injurious behavior or ideation.   Patient denies other safety concerns.   Patient reports there has been no change in risk factors since their last session.     Patient reports there has been no change in protective factors since their last session.     A safety and risk management plan has been developed including: Patient consented to co-developed safety plan on 11/24/2020, updated 2/20/23.  Safety and risk management plan was reviewed.   Patient agreed to use safety plan should any safety concerns arise.  A copy was made available to the  patient.     Appearance:   Appropriate    Eye Contact:   Good    Psychomotor Behavior: Normal    Attitude:   Cooperative    Orientation:   All   Speech    Rate / Production: Normal/ Responsive    Volume:  Normal    Mood:    Anxious    Affect:    Appropriate  Tearful   Thought Content:  Clear    Thought Form:  Coherent    Insight:    Good      Medication Review:   No changes to current psychiatric medication(s)     Medication Compliance:   Yes     Changes in Health Issues:   None reported     Chemical Use Review:   Substance Use: Chemical use reviewed, no active concerns identified Nothing used since 2021.     Tobacco Use: No current tobacco use.      Diagnosis:  1. Bipolar 2 disorder (H)    2. Generalized anxiety disorder    3. Trauma and stressor-related disorder      Collateral Reports Completed:   Not Applicable    PLAN: (Patient Tasks / Therapist Tasks / Other)  Check in on family at next session  Get back to the pool  Get the bedrooms set  Journal at least once prior to next session  Schedule shoulder surgery/see [place]      There has been demonstrated improvement in functioning while patient has been engaged in psychotherapy/psychological service- if withdrawn the patient would deteriorate and/or relapse.     MICAELA SLADE, [facility]   2023                                                        ______________________________________________________________________    Individual Treatment Plan    Patient's Name: Randi Cleary  YOB: 1955    Date of Creation: 20  Date Treatment Plan Last Reviewed/Revised: 23    DSM5 Diagnoses: 296.89 Bipolar II Disorder Depressed, 300.02 (F41.1) Generalized Anxiety Disorder, or Adjustment Disorders  309.89 (F43.8) Other Specified Trauma and Stressor Related Disorder  Psychosocial / Contextual Factors: Patient's entire family of origin has , she now has a sister-in-law and  as support.  Relationship with  is  "conflictual. She is recovering from surgeries  PROMIS (reviewed every 90 days): PROMIS-10 Scores  PROMIS 10-Global Health (only subscores and total score):   PROMIS-10 Scores Only 12/14/2021 3/15/2022 3/15/2022 3/15/2022 3/24/2022 4/1/2022 6/9/2022   Global Mental Health Score 6 6 6 6 8 12 12   Global Physical Health Score 9 9 9 9 8 11 10   PROMIS TOTAL - SUBSCORES 15 15 15 15 16 23 22       Referral / Collaboration:  Referral to another professional/service is not indicated at this time..    Anticipated number of session for this episode of care: 50  Anticipation frequency of session: Biweekly  Anticipated Duration of each session: 53 or more minutes due to intensity of trauma symptoms  Treatment plan will be reviewed in 90 days or when goals have been changed.   There has been demonstrated improvement in functioning while patient has been engaged in psychotherapy/psychological service- if withdrawn the patient would deteriorate and/or relapse.       MeasurableTreatment Goal(s) related to diagnosis / functional impairment(s)  Goal 1: Patient will \"jumpstart, getting going with the things I need to be doing around the house as far as picking up, doing things, trying to do something every day.  Also to lessen the animosity between me and my .\"    I will know I've met my goal when my shoulders are fixed and I can see.      Objective #A (Patient Action)    Patient will  increase frequency of engaging her in ADLs .  Status: Continued - Date(s): 12/10/21, 3/9/22, 6/9/22, 9/2/22, 12/1/22, 3/2/23, 6/6/23, 9/13/23    Intervention(s)  Therapist will  engage patient in CBT, specifically behavioral activation .    Objective #B  Patient will   track and record at least 5 pleasant exchanges with . Patient will be able to identify at least 5 positive traits about her  and how he relates to her .  Status: Continued - Date(s): 12/10/21, 3/9/22, 6/9/22, 9/2/22, 12/1/22, 3/2/23, 6/6/23, " "9/13/23    Intervention(s)  Therapist will  teach assertiveness skills and assign homework related to relationship interactions .    Objective #C  Patient will  reduce level of depressive and anxious features as evidenced by reduction in score on her CHAVO-7 and PHQ-9 (scores of 15 and 16 at first measurment, respectively) .  Status: Continued - Date(s): 12/10/21, 3/9/22, 6/9/22, 9/2/22, 12/1/22, 3/2/23, 6/6/23, 9/13/23    Intervention(s)  Therapist will  engage patient in person-centered therapy and CBT .    Patient has reviewed and agreed to the above plan.      MICAELA SLADE, [facility]  September 13, 2023                                                   Randi Cleary          SAFETY PLAN:  Step 1: Warning signs / cues (Thoughts, images, mood, situation, behavior) that a crisis may be developing:  Thoughts: \"I don't want to continue\" \"I am unwanted\"  Images: none  Thinking Processes: ruminating  Mood: anger  Behaviors: isolating/withdrawing , can't stop crying, not taking care of myself and not taking care of my responsibilities  Situations: small triggers, such as not being able to find something, or dropping something   Step 2: Coping strategies - Things I can do to take my mind off of my problems without contacting another person (relaxation technique, physical activity):  Distress Tolerance Strategies:  arts and crafts: drawing, play with my pet , listen to positive and upbeat music: any, change body temperature (ice pack/cold water)  and paced breathing/progressive muscle relaxation  Physical Activities: go for a walk, deep breathing and stretching   Focus on helpful thoughts:  \"You've been through this before, you can get through it again.\"  Step 3: People and social settings that provide distraction:                 Name: Carmen                            Name: Darien                           Name: Aleida       pool, shopping, Carmen's house, [employer]       Step 4: Remind myself of people and things that are " important to me and worth living for:  Sidney Clifford, Aleida, post-COVID world, options of what could be in your future        Step 5: When I am in crisis, I can ask these people to help me use my safety plan:                 Name: Sidney  Step 6: Making the environment safe:   go to sleep/daydream  Step 7: Professionals or agencies I can contact during a crisis:  [facility] Daytime Number: [phone]  Suicide Prevention Lifeline: 5-354-050-PSIQ (7152)  Crisis Text Line Service (available 24 hours a day, 7 days a week): Text [place] to 940736  Local Crisis Services: [facility]: 829.910.1951  Adults can always access to the emPATH unit at [facility] (no phone number, utilize it like an urgent care or ER where you just show up)     Call 911 or go to my nearest emergency department.       I helped develop this safety plan and agree to use it when needed.  I have been given a copy of this plan.       Client signature _________________________________________________________________  Today s date:  11/24/2020  Adapted from Safety Plan Template 2008 Randi Poole and Robby Barba is reprinted with the express permission of the authors.  No portion of the Safety Plan Template may be reproduced without the express, written permission.  You can contact the authors at [email or web address] or [email or web address].

## 2023-11-20 ENCOUNTER — VIRTUAL VISIT (OUTPATIENT)
Dept: FAMILY MEDICINE | Facility: CLINIC | Age: 68
End: 2023-11-20
Payer: MEDICARE

## 2023-11-20 DIAGNOSIS — Z71.3 DIETARY COUNSELING AND SURVEILLANCE: Primary | ICD-10-CM

## 2023-11-20 NOTE — PROGRESS NOTES
PATIENT HISTORY:   Ms. Randi Cleary returns for her nutrition visit to the lifestyle medicine weight management program. Previous measurements have been as follows:    Wt Readings from Last 5 Encounters:   10/19/23 101.1 kg (222 lb 12.8 oz)   10/18/23 100.2 kg (221 lb)   09/28/23 104.8 kg (231 lb)   09/25/23 103.9 kg (229 lb)   09/25/23 104 kg (229 lb 4.8 oz)     Reported wts:  9/28 - 231 lb  10/19 - 222 lb  11/9 - 219.7 lb  11/10 - 218.4  11/11 - 218.6  11/12 - 220.3  11/13 - 220.6  11/14 - 220.7  11/15 - 220.9  11/16 - 219.7  11/17 - 220  11/18 - 219.4  11/19 - 218.3  11/20 - 217     Initial goal is to achieve a 5% weight loss of 11-12 lbs (i.e. A body weight of 216-217 lbs) within the next 3-4 months.        Previous goals:  Continue focus on eating on a regular basis, with focus on protein, vegetable and fruit  On salads - eggs, cottage cheese, fish, chicken, garbanzo beans  If having trouble eating produce in time, consider freezing and using in smoothies     Since her last visit, she reports doing okay at times, but overall feeling discouraged about weight. Has lost weight and regained. Had wanted to get to 200 lb by 12/5/23, we discussed the pros/cons of achieving this. Admits that she feels she's eating well. Needs fruit or snack between 8-9 pm but avoiding cookies, etc. Has not yet gotten back to the pool, will follow up with MD to be cleared for pool by 12/4    PHYSICAL ACTIVITY HISTORY:  Now, activity is very limited due to RIGHT ankle injuries, low back pain. Her activity is going up and down stairs in her house to do laundry or getting out of the house to go to appointments. Also, feeds the cat. Has trouble lifting because of her shoulders. Has a stationary bicycle at home, not really able to use at this point (sounds like a bike on a )  Long term goal is to walk in the pool  Interested in more activity, yoga - Silver Sneakers     Social: .  is  and may work  "very long 12 hour days.      OBJECTIVE:   Estimated body mass index is 35.96 kg/m  as calculated from the following:    Height as of 10/19/23: 1.676 m (5' 6\").    Weight as of 10/19/23: 101.1 kg (222 lb 12.8 oz).      IMPRESSION:   Winnie is a 68 year old with obesity who is motivated to lose weight in order to improve her health  Weight has been stable/trending down this month (albeit slowly)      DIETARY ASSESSMENT/PLAN:   Snack ideas discussed based on her preferences - fruit, yogurt, cottage cheese, or nuts   Focus on smaller victories instead of only the \"goal weight\"  Trust the process even during weight plateau     Follow-up:   Wednesday December 20 at 2:00 pm via virtual    Patient referred by Laith Constantino MD   Total time spent with patient 40 minutes    Ekaterina Horton RD    Family Medicine Video Visit Note  Winnie is being evaluated via a billable video visit.             Video Visit Consent     The patient has been notified of following:     \"This video visit will be conducted via a call between you and your physician/provider. We have found that certain health care needs can be provided without the need for an in-person physical exam.  This service lets us provide the care you need with a video conversation.  If a prescription is necessary we can send it directly to your pharmacy.  If lab work is needed we can place an order for that and you can then stop by our lab to have the test done at a later time.    Video visits are billed at different rates depending on your insurance coverage.  Please reach out to your insurance provider with any questions.    If during the course of the call the physician/provider feels a video visit is not appropriate, you will not be charged for this service.\"    Patient has given verbal consent for Video visit? Yes    How would you like to obtain your AVS? [employer]    Patient would like the video invitation sent by: Other e-mail: my chart    Will anyone else be " joining your video visit? No       Video-Visit Details    Type of service:  Video Visit    Video Start Time: 3:40 PM  THIS is the time provider and patient connect.    Video End Time (time video stopped): 4:20 PM    Originating Location (pt. Location): Home    Distant Location (provider location):  [facility]     Mode of Communication:  Video Conference via Isaac Horton RD

## 2023-11-27 ENCOUNTER — VIRTUAL VISIT (OUTPATIENT)
Dept: PSYCHOLOGY | Facility: CLINIC | Age: 68
End: 2023-11-27
Payer: MEDICARE

## 2023-11-27 DIAGNOSIS — F31.81 BIPOLAR 2 DISORDER (H): Primary | ICD-10-CM

## 2023-11-27 DIAGNOSIS — F43.9 TRAUMA AND STRESSOR-RELATED DISORDER: ICD-10-CM

## 2023-11-27 DIAGNOSIS — F41.1 GENERALIZED ANXIETY DISORDER: ICD-10-CM

## 2023-11-27 PROCEDURE — 90837 PSYTX W PT 60 MINUTES: CPT | Mod: VID | Performed by: SOCIAL WORKER

## 2023-11-27 NOTE — PROGRESS NOTES
[facility]                                     Progress Note    Patient Name: Randi Cleary  Date: 11/27/23         Service Type: Individual     Session Start Time: 3:35 PM Session End Time: 4:31 PM     Session Length: 56 minutes    Session #: 216    Attendees: Client attended alone    Service Modality:  Video Visit:      Provider verified identity through the following two step process.  Patient provided:  Patient is known previously to provider    Telemedicine Visit: The patient's condition can be safely assessed and treated via synchronous audio and visual telemedicine encounter.      Reason for Telemedicine Visit: Patient convenience (e.g. access to timely appointments / distance to available provider)    Originating Site (Patient Location): Patient's home    Distant Site (Provider Location): Provider Remote Setting- Home Office    Consent:  The patient/guardian has verbally consented to: the potential risks and benefits of telemedicine (video visit) versus in person care; bill my insurance or make self-payment for services provided; and responsibility for payment of non-covered services.     Patient would like the video invitation sent by:  My Chart    Mode of Communication:  Video Conference via [employer], first 12 minutes via phone due to patient having problems getting connected    Distant Location (Provider):  Off-site    As the provider I attest to compliance with applicable laws and regulations related to telemedicine.      DATA  Extended Session (53+ minutes): PROLONGED SERVICE IN THE OUTPATIENT SETTING REQUIRING DIRECT (FACE-TO-FACE) PATIENT CONTACT BEYOND THE USUAL SERVICE:    - High distress and under complex circumstances.  See Data section for details    - Treatment protocol required additional time to complete, due to the nature of diagnosis being treated.  See Interventions section for details  Interactive Complexity: No  Crisis: No        Progress Since Last Session  (Related to Symptoms / Goals / Homework):   Symptoms:  Patient is highly distressed and dysregulated       Homework:  Progress made  Check in on family at next session  Get back to the pool  Get the bedrooms set  Journal at least once prior to next session  Schedule shoulder surgery/see Vincent     Episode of Care Goals: Satisfactory progress - ACTION (Actively working towards change); Intervened by reinforcing change plan / affirming steps taken     Current / Ongoing Stressors and Concerns:   Patient is currently socially isolated. She has a conflictual relationship with her .  She is getting minimal physical activity.  She has had several surgeries.      Treatment Objective(s) Addressed in This Session:   Patient will increase frequency of engaging her in ADLs.  Patient will track and record at least 5 pleasant exchanges with . Patient will be able to identify at least 5 positive traits about her .  Patient will reduce level of depressive and anxious features as evidenced by reduction in score on her CHAVO-7 and PHQ-9 (scores of 15 and 16 at first measurment, respectively).     Intervention:   Supportive Therapy:   Helped patient process challenges with her medications and regulate as she was very dysregulated regarding the situation. Talked about the numerous frustrations she's had and how to move forward from this point. Patient shared about how this connected to some of her family situation and how this was impacting her present relationship.      The following assessments were completed by patient for this visit:  PHQ9:       9/13/2023    10:57 AM 9/18/2023     9:51 AM 9/20/2023    10:10 AM 9/27/2023     4:45 AM 10/9/2023     8:52 AM 10/16/2023     8:25 AM 10/31/2023     4:16 AM   PHQ-9 SCORE   PHQ-9 Total Score La 17 (Moderately severe depression) 18 (Moderately severe depression) 18 (Moderately severe depression) 17 (Moderately severe depression) 18 (Moderately severe depression) 12  (Moderate depression) 18 (Moderately severe depression)   PHQ-9 Total Score 17 18 18 17    17 18 12 18     GAD7:       7/21/2023    10:34 AM 8/7/2023     1:03 PM 8/21/2023    11:07 AM 9/5/2023     1:30 PM 9/20/2023     9:59 AM 10/9/2023     8:55 AM 10/31/2023     4:25 AM   CHAVO-7 SCORE   Total Score 18 (severe anxiety) 18 (severe anxiety) 13 (moderate anxiety) 15 (severe anxiety) 16 (severe anxiety) 15 (severe anxiety) 15 (severe anxiety)   Total Score 18 18    18 13 15 16    16    16 15 15     PROMIS 10-Global Health (only subscores and total score):       6/22/2023    10:03 AM 7/6/2023     9:12 AM 7/21/2023    10:36 AM 7/28/2023     3:24 PM 8/7/2023     1:07 PM 9/20/2023    10:13 AM 10/31/2023     4:36 AM   PROMIS-10 Scores Only   Global Mental Health Score 5    5 5    5 5    5  6    6     5    5 6   Global Physical Health Score 7    7 8    8 7    7  8    8     9    9 8   PROMIS TOTAL - SUBSCORES 12    12 13    13 12    12  14    14     14    14 14       Information is confidential and restricted. Go to Review Flowsheets to unlock data.    Multiple values from one day are sorted in reverse-chronological order        ASSESSMENT: Current Emotional / Mental Status (status of significant symptoms):   Risk status (Self / Other harm or suicidal ideation)   Patient denies current fears or concerns for personal safety.   Patient denies current or recent suicidal ideation or behaviors.   Patient denies current or recent homicidal ideation or behaviors.   Patient denies current or recent self injurious behavior or ideation.   Patient denies other safety concerns.   Patient reports there has been no change in risk factors since their last session.     Patient reports there has been no change in protective factors since their last session.     A safety and risk management plan has been developed including: Patient consented to co-developed safety plan on 11/24/2020, updated 2/20/23.  Safety and risk management plan was  reviewed.   Patient agreed to use safety plan should any safety concerns arise.  A copy was made available to the patient.     Appearance:   Appropriate    Eye Contact:   Good    Psychomotor Behavior: Normal    Attitude:   Cooperative    Orientation:   All   Speech    Rate / Production: Normal/ Responsive    Volume:  Normal    Mood:    Anxious    Affect:    Appropriate  Tearful   Thought Content:  Clear    Thought Form:  Coherent    Insight:    Good      Medication Review:   No changes to current psychiatric medication(s)     Medication Compliance:   Yes     Changes in Health Issues:   None reported     Chemical Use Review:   Substance Use: Chemical use reviewed, no active concerns identified Nothing used since August 2021.     Tobacco Use: No current tobacco use.      Diagnosis:  1. Bipolar 2 disorder (H)    2. Generalized anxiety disorder    3. Trauma and stressor-related disorder        Collateral Reports Completed:   Not Applicable    PLAN: (Patient Tasks / Therapist Tasks / Other)  Check in on family at next session  Get back to the pool  Get the bedrooms set  Journal at least once prior to next session  Schedule shoulder surgery/see [place]      There has been demonstrated improvement in functioning while patient has been engaged in psychotherapy/psychological service- if withdrawn the patient would deteriorate and/or relapse.     MICAELA SLADE, [facility]   November 27, 2023                                                        ______________________________________________________________________    Individual Treatment Plan    Patient's Name: Randi Cleary  YOB: 1955    Date of Creation: 6/30/20  Date Treatment Plan Last Reviewed/Revised: 9/13/23    DSM5 Diagnoses: 296.89 Bipolar II Disorder Depressed, 300.02 (F41.1) Generalized Anxiety Disorder, or Adjustment Disorders  309.89 (F43.8) Other Specified Trauma and Stressor Related Disorder  Psychosocial / Contextual Factors: Patient's  "entire family of origin has , she now has a sister-in-law and  as support.  Relationship with  is conflictual. She is recovering from surgeries  PROMIS (reviewed every 90 days): PROMIS-10 Scores  PROMIS 10-Global Health (only subscores and total score):   PROMIS-10 Scores Only 2021 3/15/2022 3/15/2022 3/15/2022 3/24/2022 2022 2022   Global Mental Health Score 6 6 6 6 8 12 12   Global Physical Health Score 9 9 9 9 8 11 10   PROMIS TOTAL - SUBSCORES 15 15 15 15 16 23 22       Referral / Collaboration:  Referral to another professional/service is not indicated at this time..    Anticipated number of session for this episode of care: 50  Anticipation frequency of session: Biweekly  Anticipated Duration of each session: 53 or more minutes due to intensity of trauma symptoms  Treatment plan will be reviewed in 90 days or when goals have been changed.   There has been demonstrated improvement in functioning while patient has been engaged in psychotherapy/psychological service- if withdrawn the patient would deteriorate and/or relapse.       MeasurableTreatment Goal(s) related to diagnosis / functional impairment(s)  Goal 1: Patient will \"jumpstart, getting going with the things I need to be doing around the house as far as picking up, doing things, trying to do something every day.  Also to lessen the animosity between me and my .\"    I will know I've met my goal when my shoulders are fixed and I can see.      Objective #A (Patient Action)    Patient will  increase frequency of engaging her in ADLs .  Status: Continued - Date(s): 12/10/21, 3/9/22, 22, 22, 22, 3/2/23, 23, 23    Intervention(s)  Therapist will  engage patient in CBT, specifically behavioral activation .    Objective #B  Patient will   track and record at least 5 pleasant exchanges with . Patient will be able to identify at least 5 positive traits about her  and how he relates to her " ".  Status: Continued - Date(s): 12/10/21, 3/9/22, 6/9/22, 9/2/22, 12/1/22, 3/2/23, 6/6/23, 9/13/23    Intervention(s)  Therapist will  teach assertiveness skills and assign homework related to relationship interactions .    Objective #C  Patient will  reduce level of depressive and anxious features as evidenced by reduction in score on her CHAVO-7 and PHQ-9 (scores of 15 and 16 at first measurment, respectively) .  Status: Continued - Date(s): 12/10/21, 3/9/22, 6/9/22, 9/2/22, 12/1/22, 3/2/23, 6/6/23, 9/13/23    Intervention(s)  Therapist will  engage patient in person-centered therapy and CBT .    Patient has reviewed and agreed to the above plan.      MICAELA SLADE, [facility]  September 13, 2023                                                   Randi Cleary          SAFETY PLAN:  Step 1: Warning signs / cues (Thoughts, images, mood, situation, behavior) that a crisis may be developing:  Thoughts: \"I don't want to continue\" \"I am unwanted\"  Images: none  Thinking Processes: ruminating  Mood: anger  Behaviors: isolating/withdrawing , can't stop crying, not taking care of myself and not taking care of my responsibilities  Situations: small triggers, such as not being able to find something, or dropping something   Step 2: Coping strategies - Things I can do to take my mind off of my problems without contacting another person (relaxation technique, physical activity):  Distress Tolerance Strategies:  arts and crafts: drawing, play with my pet , listen to positive and upbeat music: any, change body temperature (ice pack/cold water)  and paced breathing/progressive muscle relaxation  Physical Activities: go for a walk, deep breathing and stretching   Focus on helpful thoughts:  \"You've been through this before, you can get through it again.\"  Step 3: People and social settings that provide distraction:                 Name: Carmen                            Name: [place]                           Name: Aleida regalado, " shopping, Carmen's house, [employer]       Step 4: Remind myself of people and things that are important to me and worth living for:  Clifford Little Donna, post-COVID world, options of what could be in your future        Step 5: When I am in crisis, I can ask these people to help me use my safety plan:                 Name: Sidney  Step 6: Making the environment safe:   go to sleep/daydream  Step 7: Professionals or agencies I can contact during a crisis:  [facility] Daytime Number: [phone]  Suicide Prevention Lifeline: 4-862-093-TALK (8255)  Crisis Text Line Service (available 24 hours a day, 7 days a week): Text [place] to 348315  Local Crisis Services: [facility] Crisis: [phone]  Adults can always access to the emPATH unit at [facility] (no phone number, utilize it like an urgent care or ER where you just show up)     Call 911 or go to my nearest emergency department.       I helped develop this safety plan and agree to use it when needed.  I have been given a copy of this plan.       Client signature _________________________________________________________________  Today s date:  11/24/2020  Adapted from Safety Plan Template 2008 Randi Poole and Robby Barba is reprinted with the express permission of the authors.  No portion of the Safety Plan Template may be reproduced without the express, written permission.  You can contact the authors at [email or web address] or [email or web address].

## 2023-11-27 NOTE — PATIENT INSTRUCTIONS
"DIETARY ASSESSMENT/PLAN:   Snack ideas discussed based on her preferences - fruit, yogurt, cottage cheese, or nuts   Focus on smaller victories instead of only the \"goal weight\"  Trust the process even during weight plateau     Follow-up:   Wednesday December 20 at 2:00 pm via virtual    "

## 2023-12-01 ENCOUNTER — VIRTUAL VISIT (OUTPATIENT)
Dept: PSYCHOLOGY | Facility: CLINIC | Age: 68
End: 2023-12-01
Payer: MEDICARE

## 2023-12-01 DIAGNOSIS — F31.81 BIPOLAR 2 DISORDER (H): Primary | ICD-10-CM

## 2023-12-01 DIAGNOSIS — F43.9 TRAUMA AND STRESSOR-RELATED DISORDER: ICD-10-CM

## 2023-12-01 DIAGNOSIS — F41.1 GENERALIZED ANXIETY DISORDER: ICD-10-CM

## 2023-12-01 PROCEDURE — 90834 PSYTX W PT 45 MINUTES: CPT | Mod: VID | Performed by: SOCIAL WORKER

## 2023-12-01 ASSESSMENT — ANXIETY QUESTIONNAIRES
IF YOU CHECKED OFF ANY PROBLEMS ON THIS QUESTIONNAIRE, HOW DIFFICULT HAVE THESE PROBLEMS MADE IT FOR YOU TO DO YOUR WORK, TAKE CARE OF THINGS AT HOME, OR GET ALONG WITH OTHER PEOPLE: VERY DIFFICULT
5. BEING SO RESTLESS THAT IT IS HARD TO SIT STILL: SEVERAL DAYS
GAD7 TOTAL SCORE: 14
GAD7 TOTAL SCORE: 14
7. FEELING AFRAID AS IF SOMETHING AWFUL MIGHT HAPPEN: SEVERAL DAYS
6. BECOMING EASILY ANNOYED OR IRRITABLE: NEARLY EVERY DAY
2. NOT BEING ABLE TO STOP OR CONTROL WORRYING: MORE THAN HALF THE DAYS
4. TROUBLE RELAXING: NEARLY EVERY DAY
1. FEELING NERVOUS, ANXIOUS, OR ON EDGE: NEARLY EVERY DAY
3. WORRYING TOO MUCH ABOUT DIFFERENT THINGS: SEVERAL DAYS

## 2023-12-01 NOTE — PROGRESS NOTES
[facility]                                     Progress Note    Patient Name: Randi Cleary  Date: 12/1/23         Service Type: Individual     Session Start Time: 7:01 AM Session End Time: 7:46 AM     Session Length: 45 minutes    Session #: 217    Attendees: Client attended alone    Service Modality:  Video Visit:      Provider verified identity through the following two step process.  Patient provided:  Patient is known previously to provider    Telemedicine Visit: The patient's condition can be safely assessed and treated via synchronous audio and visual telemedicine encounter.      Reason for Telemedicine Visit: Patient convenience (e.g. access to timely appointments / distance to available provider)    Originating Site (Patient Location): Patient's home    Distant Site (Provider Location): Provider Remote Setting- Home Office    Consent:  The patient/guardian has verbally consented to: the potential risks and benefits of telemedicine (video visit) versus in person care; bill my insurance or make self-payment for services provided; and responsibility for payment of non-covered services.     Patient would like the video invitation sent by:  My Chart    Mode of Communication:  Video Conference via [facility]    Distant Location (Provider):  Off-site    As the provider I attest to compliance with applicable laws and regulations related to telemedicine.      DATA  Extended Session (53+ minutes): No  Interactive Complexity: No  Crisis: No        Progress Since Last Session (Related to Symptoms / Goals / Homework):   Symptoms:  Patient continues to struggle with dysregulation       Homework:  Progress made  -patient was focused on medical concerns and follow up  Check in on family at next session  Get back to the pool  Get the bedrooms set  Journal at least once prior to next session  Schedule shoulder surgery/see Vincent     Episode of Care Goals: Satisfactory progress - ACTION (Actively  working towards change); Intervened by reinforcing change plan / affirming steps taken     Current / Ongoing Stressors and Concerns:   Patient is currently socially isolated. She has a conflictual relationship with her .  She is getting minimal physical activity.  She has had several surgeries.      Treatment Objective(s) Addressed in This Session:   Patient will increase frequency of engaging her in ADLs.  Patient will track and record at least 5 pleasant exchanges with . Patient will be able to identify at least 5 positive traits about her .  Patient will reduce level of depressive and anxious features as evidenced by reduction in score on her CHAVO-7 and PHQ-9 (scores of 15 and 16 at first measurment, respectively).     Intervention:   Supportive Therapy:   Patient shared her experience over the past week with her health care providers. Helped patient regulate and process.      The following assessments were completed by patient for this visit:  PHQ9:       9/13/2023    10:57 AM 9/18/2023     9:51 AM 9/20/2023    10:10 AM 9/27/2023     4:45 AM 10/9/2023     8:52 AM 10/16/2023     8:25 AM 10/31/2023     4:16 AM   PHQ-9 SCORE   PHQ-9 Total Score MyChart 17 (Moderately severe depression) 18 (Moderately severe depression) 18 (Moderately severe depression) 17 (Moderately severe depression) 18 (Moderately severe depression) 12 (Moderate depression) 18 (Moderately severe depression)   PHQ-9 Total Score 17 18 18 17    17 18 12 18     GAD7:       7/21/2023    10:34 AM 8/7/2023     1:03 PM 8/21/2023    11:07 AM 9/5/2023     1:30 PM 9/20/2023     9:59 AM 10/9/2023     8:55 AM 10/31/2023     4:25 AM   CHAVO-7 SCORE   Total Score 18 (severe anxiety) 18 (severe anxiety) 13 (moderate anxiety) 15 (severe anxiety) 16 (severe anxiety) 15 (severe anxiety) 15 (severe anxiety)   Total Score 18 18    18 13 15 16    16    16 15 15     PROMIS 10-Global Health (only subscores and total score):       6/22/2023    10:03 AM  7/6/2023     9:12 AM 7/21/2023    10:36 AM 7/28/2023     3:24 PM 8/7/2023     1:07 PM 9/20/2023    10:13 AM 10/31/2023     4:36 AM   PROMIS-10 Scores Only   Global Mental Health Score 5    5 5    5 5    5  6    6     5    5 6   Global Physical Health Score 7    7 8    8 7    7  8    8     9    9 8   PROMIS TOTAL - SUBSCORES 12    12 13    13 12    12  14    14     14    14 14       Information is confidential and restricted. Go to Review Flowsheets to unlock data.    Multiple values from one day are sorted in reverse-chronological order        ASSESSMENT: Current Emotional / Mental Status (status of significant symptoms):   Risk status (Self / Other harm or suicidal ideation)   Patient denies current fears or concerns for personal safety.   Patient denies current or recent suicidal ideation or behaviors.   Patient denies current or recent homicidal ideation or behaviors.   Patient denies current or recent self injurious behavior or ideation.   Patient denies other safety concerns.   Patient reports there has been no change in risk factors since their last session.     Patient reports there has been no change in protective factors since their last session.     A safety and risk management plan has been developed including: Patient consented to co-developed safety plan on 11/24/2020, updated 2/20/23.  Safety and risk management plan was reviewed.   Patient agreed to use safety plan should any safety concerns arise.  A copy was made available to the patient.     Appearance:   Appropriate    Eye Contact:   Good    Psychomotor Behavior: Normal    Attitude:   Cooperative    Orientation:   All   Speech    Rate / Production: Normal/ Responsive    Volume:  Normal    Mood:    Anxious    Affect:    Appropriate  Tearful   Thought Content:  Clear    Thought Form:  Coherent    Insight:    Good      Medication Review:   No changes to current psychiatric medication(s)     Medication Compliance:   Yes     Changes in Health  Issues:   None reported     Chemical Use Review:   Substance Use: Chemical use reviewed, no active concerns identified Nothing used since 2021.     Tobacco Use: No current tobacco use.      Diagnosis:  1. Bipolar 2 disorder (H)    2. Generalized anxiety disorder    3. Trauma and stressor-related disorder          Collateral Reports Completed:   Not Applicable    PLAN: (Patient Tasks / Therapist Tasks / Other)  Check in on family at next session  Get back to the pool  Get the bedrooms set  Journal at least once prior to next session  Schedule shoulder surgery/see [place]      There has been demonstrated improvement in functioning while patient has been engaged in psychotherapy/psychological service- if withdrawn the patient would deteriorate and/or relapse.     MICAELA SLADE, [facility]   2023                                                        ______________________________________________________________________    Individual Treatment Plan    Patient's Name: Randi Cleary  YOB: 1955    Date of Creation: 20  Date Treatment Plan Last Reviewed/Revised: 23    DSM5 Diagnoses: 296.89 Bipolar II Disorder Depressed, 300.02 (F41.1) Generalized Anxiety Disorder, or Adjustment Disorders  309.89 (F43.8) Other Specified Trauma and Stressor Related Disorder  Psychosocial / Contextual Factors: Patient's entire family of origin has , she now has a sister-in-law and  as support.  Relationship with  is conflictual. She is recovering from surgeries  PROMIS (reviewed every 90 days): PROMIS-10 Scores  PROMIS 10-Global Health (only subscores and total score):   PROMIS-10 Scores Only 2021 3/15/2022 3/15/2022 3/15/2022 3/24/2022 2022 2022   Global Mental Health Score 6 6 6 6 8 12 12   Global Physical Health Score 9 9 9 9 8 11 10   PROMIS TOTAL - SUBSCORES 15 15 15 15 16 23 22       Referral / Collaboration:  Referral to another professional/service is not  "indicated at this time..    Anticipated number of session for this episode of care: 50  Anticipation frequency of session: Biweekly  Anticipated Duration of each session: 53 or more minutes due to intensity of trauma symptoms  Treatment plan will be reviewed in 90 days or when goals have been changed.   There has been demonstrated improvement in functioning while patient has been engaged in psychotherapy/psychological service- if withdrawn the patient would deteriorate and/or relapse.       MeasurableTreatment Goal(s) related to diagnosis / functional impairment(s)  Goal 1: Patient will \"jumpstart, getting going with the things I need to be doing around the house as far as picking up, doing things, trying to do something every day.  Also to lessen the animosity between me and my .\"    I will know I've met my goal when my shoulders are fixed and I can see.      Objective #A (Patient Action)    Patient will  increase frequency of engaging her in ADLs .  Status: Continued - Date(s): 12/10/21, 3/9/22, 6/9/22, 9/2/22, 12/1/22, 3/2/23, 6/6/23, 9/13/23    Intervention(s)  Therapist will  engage patient in CBT, specifically behavioral activation .    Objective #B  Patient will   track and record at least 5 pleasant exchanges with . Patient will be able to identify at least 5 positive traits about her  and how he relates to her .  Status: Continued - Date(s): 12/10/21, 3/9/22, 6/9/22, 9/2/22, 12/1/22, 3/2/23, 6/6/23, 9/13/23    Intervention(s)  Therapist will  teach assertiveness skills and assign homework related to relationship interactions .    Objective #C  Patient will  reduce level of depressive and anxious features as evidenced by reduction in score on her CHAVO-7 and PHQ-9 (scores of 15 and 16 at first measurment, respectively) .  Status: Continued - Date(s): 12/10/21, 3/9/22, 6/9/22, 9/2/22, 12/1/22, 3/2/23, 6/6/23, 9/13/23    Intervention(s)  Therapist will  engage patient in person-centered " "therapy and CBT .    Patient has reviewed and agreed to the above plan.      CRUZ RAEMICAELA PAULINO JO, [facility]  September 13, 2023                                                   Randi Cleary          SAFETY PLAN:  Step 1: Warning signs / cues (Thoughts, images, mood, situation, behavior) that a crisis may be developing:  Thoughts: \"I don't want to continue\" \"I am unwanted\"  Images: none  Thinking Processes: ruminating  Mood: anger  Behaviors: isolating/withdrawing , can't stop crying, not taking care of myself and not taking care of my responsibilities  Situations: small triggers, such as not being able to find something, or dropping something   Step 2: Coping strategies - Things I can do to take my mind off of my problems without contacting another person (relaxation technique, physical activity):  Distress Tolerance Strategies:  arts and crafts: drawing, play with my pet , listen to positive and upbeat music: any, change body temperature (ice pack/cold water)  and paced breathing/progressive muscle relaxation  Physical Activities: go for a walk, deep breathing and stretching   Focus on helpful thoughts:  \"You've been through this before, you can get through it again.\"  Step 3: People and social settings that provide distraction:                 Name: Carmen                            Name: Darien                           Name: Aleida       pool, shopping, Carmen's house, [employer]       Step 4: Remind myself of people and things that are important to me and worth living for:  Clifford Little Donna, post-COVID world, options of what could be in your future        Step 5: When I am in crisis, I can ask these people to help me use my safety plan:                 Name: Sidney  Step 6: Making the environment safe:   go to sleep/daydream  Step 7: Professionals or agencies I can contact during a crisis:  [facility] Daytime Number: [phone]  Suicide Prevention Lifeline: 5-981-685-DOMG (9962)  Crisis Text Line " Service (available 24 hours a day, 7 days a week): Text [place] to 579559  Local Crisis Services: [facility] Crisis: [phone]  Adults can always access to the emPATH unit at [facility] (no phone number, utilize it like an urgent care or ER where you just show up)     Call 911 or go to my nearest emergency department.       I helped develop this safety plan and agree to use it when needed.  I have been given a copy of this plan.       Client signature _________________________________________________________________  Today s date:  11/24/2020  Adapted from Safety Plan Template 2008 Randi Poole and Robby Barba is reprinted with the express permission of the authors.  No portion of the Safety Plan Template may be reproduced without the express, written permission.  You can contact the authors at [email or web address] or [email or web address].

## 2023-12-01 NOTE — PROGRESS NOTES
[facility]                                     Progress Note    Patient Name: Randi Cleary  Date: 5/24/23         Service Type: Individual     Session Start Time: 3:05 PM  Session End Time: 3:58 PM     Session Length: 53 minutes    Session #: 178    Attendees: Client attended alone    Service Modality:  Video Visit:      Provider verified identity through the following two step process.  Patient provided:  Patient is known previously to provider    Telemedicine Visit: The patient's condition can be safely assessed and treated via synchronous audio and visual telemedicine encounter.      Reason for Telemedicine Visit: Patient convenience (e.g. access to timely appointments / distance to available provider)    Originating Site (Patient Location): Patient's home    Distant Site (Provider Location): Provider Remote Setting- Home Office    Consent:  The patient/guardian has verbally consented to: the potential risks and benefits of telemedicine (video visit) versus in person care; bill my insurance or make self-payment for services provided; and responsibility for payment of non-covered services.     Patient would like the video invitation sent by:  My Chart    Mode of Communication:  Video Conference via [facility]    Distant Location (Provider):  Off-site    As the provider I attest to compliance with applicable laws and regulations related to telemedicine.      DATA  Extended Session (53+ minutes): PROLONGED SERVICE IN THE OUTPATIENT SETTING REQUIRING DIRECT (FACE-TO-FACE) PATIENT CONTACT BEYOND THE USUAL SERVICE:    - Patient's presenting concerns require more intensive intervention than could be completed within the usual service  Interactive Complexity: No  Crisis: No        Progress Since Last Session (Related to Symptoms / Goals / Homework):   Symptoms: Patient reports less frequent but more intense panic attacks    Homework: Progress made  Make appointment with Bre Curry for PT  -done  Get to the [facility]  Call [facility] for shoulder consult -done  Talk to [facility] about finding long term psychiatry -done    In the future:  Look at a budget  Get back to the pool  Talk to your primary care doctor about what's on your list (mood, neck, arthritis)         Episode of Care Goals: Satisfactory progress - ACTION (Actively working towards change); Intervened by reinforcing change plan / affirming steps taken     Current / Ongoing Stressors and Concerns:   Patient is currently socially isolated. She has a conflictual relationship with her .  She is getting minimal physical activity.  She has had several surgeries.      Treatment Objective(s) Addressed in This Session:   Patient will increase frequency of engaging her in ADLs.  Patient will track and record at least 5 pleasant exchanges with . Patient will be able to identify at least 5 positive traits about her .  Patient will reduce level of depressive and anxious features as evidenced by reduction in score on her CHAVO-7 and PHQ-9 (scores of 15 and 16 at first measurment, respectively).     Intervention:   Supportive Therapy:   Talked through frustrations with transitioning to in person IOP services. Talked about gains made and how she is making them. Discussed next steps with this. Reviewed homework and identified successes and barriers to completion.        The following assessments were completed by patient for this visit:  PHQ9:       3/13/2023     8:02 AM 3/20/2023    10:35 AM 4/7/2023    12:25 PM 4/25/2023    12:02 PM 4/28/2023    12:33 PM 5/5/2023     8:09 AM 5/16/2023     8:39 AM   PHQ-9 SCORE   PHQ-9 Total Score MyChart 16 (Moderately severe depression) 15 (Moderately severe depression) 14 (Moderate depression) 11 (Moderate depression) 12 (Moderate depression) 15 (Moderately severe depression) 14 (Moderate depression)   PHQ-9 Total Score 16 15 14 11 12 15 14     GAD7:       2/20/2023    10:48 AM 3/9/2023     9:27 AM  3/24/2023     9:06 AM 4/7/2023    12:26 PM 4/25/2023    12:02 PM 5/5/2023     8:07 AM 5/22/2023    10:12 AM   CHAVO-7 SCORE   Total Score 17 (severe anxiety) 15 (severe anxiety) 15 (severe anxiety) 10 (moderate anxiety) 10 (moderate anxiety) 11 (moderate anxiety) 10 (moderate anxiety)   Total Score 17    17    17    17    17 15    15    15 15    15    15    15 10 10    10 11 10     PROMIS 10-Global Health (only subscores and total score):       1/5/2023    11:28 AM 2/5/2023     9:48 PM 3/9/2023     9:30 AM 4/7/2023    12:28 PM 4/19/2023     8:26 AM 4/25/2023    12:04 PM 5/5/2023     8:12 AM   PROMIS-10 Scores Only   Global Mental Health Score 5    5        5 5    5    5    5    5 6    6    6 7    7    7    7 8    8    8 7    7 6   Global Physical Health Score 8    8        8 8    8    8    8    8 6    6    6 8    8    8    8 7    7    7 10    10 9   PROMIS TOTAL - SUBSCORES 13    13        13 13    13    13    13    13 12    12    12 15    15    15    15 15    15    15 17    17 15       Information is confidential and restricted. Go to Review Flowsheets to unlock data.    Multiple values from one day are sorted in reverse-chronological order        ASSESSMENT: Current Emotional / Mental Status (status of significant symptoms):   Risk status (Self / Other harm or suicidal ideation)   Patient denies current fears or concerns for personal safety.   Patient denies current or recent suicidal ideation or behaviors.   Patient denies current or recent homicidal ideation or behaviors.   Patient denies current or recent self injurious behavior or ideation.   Patient denies other safety concerns.   Patient reports there has been no change in risk factors since their last session.     Patient reports there has been no change in protective factors since their last session.     A safety and risk management plan has been developed including: Patient consented to co-developed safety plan on 11/24/2020, updated 2/20/23.  Safety and  risk management plan was reviewed.   Patient agreed to use safety plan should any safety concerns arise.  A copy was made available to the patient.     Appearance:   Appropriate    Eye Contact:   Good    Psychomotor Behavior: Normal    Attitude:   Cooperative    Orientation:   All   Speech    Rate / Production: Normal/ Responsive    Volume:  Normal    Mood:    Normal   Affect:    Appropriate    Thought Content:  Clear    Thought Form:  Coherent    Insight:    Good      Medication Review:   No changes to current psychiatric medication(s)     Medication Compliance:   Yes     Changes in Health Issues:   None reported     Chemical Use Review:   Substance Use: Chemical use reviewed, no active concerns identified Nothing used since August 2021.     Tobacco Use: No current tobacco use.      Diagnosis:  1. Bipolar 2 disorder (H)    2. Generalized anxiety disorder    3. Trauma and stressor-related disorder      Collateral Reports Completed:   Not Applicable    PLAN: (Patient Tasks / Therapist Tasks / Other)  Follow up with Bre adame PT  Get to the [facility]  Decide what to do about psychiatry    In the future:  Look at a budget  Get back to the pool  Talk to your primary care doctor about what's on your list (mood, neck, arthritis)        There has been demonstrated improvement in functioning while patient has been engaged in psychotherapy/psychological service- if withdrawn the patient would deteriorate and/or relapse.     MICAELA SLADE, [facility]   May 24, 2023                                                        ______________________________________________________________________    Individual Treatment Plan    Patient's Name: Randi Cleary  YOB: 1955    Date of Creation: 6/30/20  Date Treatment Plan Last Reviewed/Revised: 3/2/23    DSM5 Diagnoses: 296.89 Bipolar II Disorder Depressed, 300.02 (F41.1) Generalized Anxiety Disorder or Adjustment Disorders  309.89 (F43.8) Other Specified Trauma  "and Stressor Related Disorder  Psychosocial / Contextual Factors: Patient's entire family of origin has , she now has a sister-in-law and  as support.  Relationship with  is conflictual. She is recovering from surgeries  PROMIS (reviewed every 90 days): PROMIS-10 Scores  PROMIS 10-Global Health (only subscores and total score):   PROMIS-10 Scores Only 2021 3/15/2022 3/15/2022 3/15/2022 3/24/2022 2022 2022   Global Mental Health Score 6 6 6 6 8 12 12   Global Physical Health Score 9 9 9 9 8 11 10   PROMIS TOTAL - SUBSCORES 15 15 15 15 16 23 22       Referral / Collaboration:  Referral to another professional/service is not indicated at this time..    Anticipated number of session for this episode of care: 50  Anticipation frequency of session: Biweekly  Anticipated Duration of each session: 53 or more minutes due to intensity of trauma symptoms  Treatment plan will be reviewed in 90 days or when goals have been changed.   There has been demonstrated improvement in functioning while patient has been engaged in psychotherapy/psychological service- if withdrawn the patient would deteriorate and/or relapse.       MeasurableTreatment Goal(s) related to diagnosis / functional impairment(s)  Goal 1: Patient will \"jumpstart, getting going with the things I need to be doing around the house as far as picking up, doing things, trying to do something every day.  Also to lessen the animosity between me and my .\"    I will know I've met my goal when my shoulders are fixed and I can see.      Objective #A (Patient Action)    Patient will increase frequency of engaging her in ADLs.  Status: Continued - Date(s): 12/10/21, 3/9/22, 22, 22, 22, 3/2/23    Intervention(s)  Therapist will engage patient in CBT, specifically behavioral activation.    Objective #B  Patient will  track and record at least 5 pleasant exchanges with . Patient will be able to identify at least 5 " "positive traits about her  and how he relates to her.  Status: Continued - Date(s): 12/10/21, 3/9/22, 6/9/22, 9/2/22, 12/1/22, 3/2/23    Intervention(s)  Therapist will teach assertiveness skills and assign homework related to relationship interactions.    Objective #C  Patient will reduce level of depressive and anxious features as evidenced by reduction in score on her CHAVO-7 and PHQ-9 (scores of 15 and 16 at first measurment, respectively).  Status: Continued - Date(s): 12/10/21, 3/9/22, 6/9/22, 9/2/22, 12/1/22, 3/2/23    Intervention(s)  Therapist will engage patient in person-centered therapy and CBT.    Patient has reviewed and agreed to the above plan.      MICAELA SLADE, [facility]  March 2, 2023                                                   Randi Cleary          SAFETY PLAN:  Step 1: Warning signs / cues (Thoughts, images, mood, situation, behavior) that a crisis may be developing:  ? Thoughts: \"I don't want to continue\" \"I am unwanted\"  ? Images: none  ? Thinking Processes: ruminating  ? Mood: anger  ? Behaviors: isolating/withdrawing , can't stop crying, not taking care of myself and not taking care of my responsibilities  ? Situations: small triggers, such as not being able to find something, or dropping something   Step 2: Coping strategies - Things I can do to take my mind off of my problems without contacting another person (relaxation technique, physical activity):  ? Distress Tolerance Strategies:  arts and crafts: drawing, play with my pet , listen to positive and upbeat music: any, change body temperature (ice pack/cold water)  and paced breathing/progressive muscle relaxation  ? Physical Activities: go for a walk, deep breathing and stretching   ? Focus on helpful thoughts:  \"You've been through this before, you can get through it again.\"  Step 3: People and social settings that provide " distraction:                 Name: Carmen                            Name: Darien                           Name: Aleida       ? pool, shopping, Carmen's house, [employer]       Step 4: Remind myself of people and things that are important to me and worth living for:  Clifford Little Donna, post-COVID world, options of what could be in your future        Step 5: When I am in crisis, I can ask these people to help me use my safety plan:                 Name: Sidney  Step 6: Making the environment safe:   ? go to sleep/daydream  Step 7: Professionals or agencies I can contact during a crisis:  ? [facility] Daytime Number: [phone]  ? Suicide Prevention Lifeline: 0-421-473-IPOK (0587)  ? Crisis Text Line Service (available 24 hours a day, 7 days a week): Text [place] to 563774    Local Crisis Services: [facility] Crisis: [phone]  Adults can always access to the emPATH unit at [facility] (no phone number, utilize it like an urgent care or ER where you just show up)     Call 911 or go to my nearest emergency department.       I helped develop this safety plan and agree to use it when needed.  I have been given a copy of this plan.       Client signature _________________________________________________________________  Today s date:  11/24/2020  Adapted from Safety Plan Template 2008 Randi Poole and Robby Barba is reprinted with the express permission of the authors.  No portion of the Safety Plan Template may be reproduced without the express, written permission.  You can contact the authors at [email or web address] or [email or web address].   show

## 2023-12-03 NOTE — PROGRESS NOTES
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 4    vitamin C (ASCORBIC ACID) 1000 MG TABS Take 1,000 mg by mouth daily      vitamin D3 (CHOLECALCIFEROL) 250 mcg (44401 units) capsule Take 1 capsule by mouth once a week                   Physical Exam  (Vitals Only)  LMP  (LMP Unknown)     Pulse Readings from Last 5 Encounters:   12/04/23 87   10/19/23 77   10/18/23 96   10/12/23 100   09/27/23 97     Wt Readings from Last 5 Encounters:   12/04/23 98.7 kg (217 lb 9.6 oz)   10/19/23 101.1 kg (222 lb 12.8 oz)   10/18/23 100.2 kg (221 lb)   09/28/23 104.8 kg (231 lb)   09/27/23 104.7 kg (230 lb 12.8 oz)     BP Readings from Last 5 Encounters:   12/04/23 (!) 134/90   10/19/23 120/70   10/18/23 (!) 140/86   10/12/23 130/84   09/27/23 114/78                 Mental Status Exam  Alertness: alert  and oriented  Appearance: adequately groomed  Behavior/Demeanor: cooperative, pleasant, and calm, with good eye contact   Speech: normal and regular rate and rhythm  Language: intact and no problems  Psychomotor: fidgety  Mood: anxious  Affect: full range and tearful; was congruent to mood  Thought Process/Associations:  tangential at times, challenging to  re-direct at   Thought Content:  Reports  passive SI without plan or intent ;  Denies violent ideation, delusions, and active SI/plan/intent  Perception:  Reports none;  Denies auditory hallucinations and visual hallucinations  Insight: fair  Judgment: fair and adequate for safety  Cognition: does  appear grossly intact; formal cognitive testing was not done  Gait and Station: remarkable for:  ambulates with cane                 Data       9/20/2023    10:13 AM 10/31/2023     4:36 AM 12/1/2023    11:52 AM   PROMIS-10 Total Score w/o Sub Scores   PROMIS TOTAL - SUBSCORES 14    14    14 14 13         6/22/2020     7:12 PM 1/18/2022    11:15 PM   CAGE-AID Total Score   Total Score 4 4   Total Score MyChart 4 (A total score of 2 or greater is considered clinically significant) 4 (A total score of 2 or greater is considered clinically significant)         10/16/2023     8:25 AM 10/31/2023     4:16 AM 12/4/2023    12:58 AM   PHQ   PHQ-9 Total Score 12 18 15    15   Q9: Thoughts of better off dead/self-harm past 2 weeks Not at all Not at all Not at all    Not at all         10/9/2023     8:55 AM 10/31/2023     4:25 AM 12/1/2023    11:37 AM   CHAVO-7 SCORE   Total Score 15 (severe anxiety) 15 (severe anxiety) 14 (moderate anxiety)   Total Score 15 15 14       Liver/kidney function Metabolic Blood counts   Recent Labs   Lab Test 10/17/23  1410 09/25/23  1018 07/05/23  1414 01/18/23  1307 08/31/22  0748   CR 0.67 0.69   < > 0.64 0.67   AST  --   --   --  27 27   ALT  --   --   --  18 21   ALKPHOS  --   --   --  75 62    < > = values in this interval not displayed.    Recent Labs   Lab Test 10/17/23  1410 07/14/23  1110 07/05/23  1414   CHOL  --   --  240*   TRIG  --   --  123   LDL  --   --  138*   HDL  --   --  77   A1C 5.7*   < >  --    TSH 0.24*  --  3.18    < > = values in this interval not displayed.    Recent Labs   Lab Test 10/17/23  1410   WBC 8.1   HGB 18.3*   HCT 52.6*   MCV 95           Administrative Billing:      150 minutes spent on the date of the encounter doing chart review and reviewing referral documents, history and exam of patient, documentation and further activities as noted above.

## 2023-12-04 ENCOUNTER — OFFICE VISIT (OUTPATIENT)
Dept: PSYCHIATRY | Facility: CLINIC | Age: 68
End: 2023-12-04
Payer: MEDICARE

## 2023-12-04 ENCOUNTER — VIRTUAL VISIT (OUTPATIENT)
Dept: PSYCHOLOGY | Facility: CLINIC | Age: 68
End: 2023-12-04
Payer: MEDICARE

## 2023-12-04 VITALS
HEART RATE: 87 BPM | WEIGHT: 217.6 LBS | BODY MASS INDEX: 35.12 KG/M2 | DIASTOLIC BLOOD PRESSURE: 90 MMHG | SYSTOLIC BLOOD PRESSURE: 134 MMHG

## 2023-12-04 DIAGNOSIS — F41.1 GENERALIZED ANXIETY DISORDER: ICD-10-CM

## 2023-12-04 DIAGNOSIS — F11.90 CHRONIC, CONTINUOUS USE OF OPIOIDS: Primary | ICD-10-CM

## 2023-12-04 DIAGNOSIS — F31.81 BIPOLAR 2 DISORDER (H): Primary | ICD-10-CM

## 2023-12-04 DIAGNOSIS — F43.9 TRAUMA AND STRESSOR-RELATED DISORDER: ICD-10-CM

## 2023-12-04 PROCEDURE — 90837 PSYTX W PT 60 MINUTES: CPT | Mod: VID | Performed by: SOCIAL WORKER

## 2023-12-04 PROCEDURE — 99417 PROLNG OP E/M EACH 15 MIN: CPT

## 2023-12-04 PROCEDURE — G0463 HOSPITAL OUTPT CLINIC VISIT: HCPCS

## 2023-12-04 PROCEDURE — 99215 OFFICE O/P EST HI 40 MIN: CPT

## 2023-12-04 ASSESSMENT — PATIENT HEALTH QUESTIONNAIRE - PHQ9
SUM OF ALL RESPONSES TO PHQ QUESTIONS 1-9: 15
10. IF YOU CHECKED OFF ANY PROBLEMS, HOW DIFFICULT HAVE THESE PROBLEMS MADE IT FOR YOU TO DO YOUR WORK, TAKE CARE OF THINGS AT HOME, OR GET ALONG WITH OTHER PEOPLE: VERY DIFFICULT
SUM OF ALL RESPONSES TO PHQ QUESTIONS 1-9: 15
SUM OF ALL RESPONSES TO PHQ QUESTIONS 1-9: 15
10. IF YOU CHECKED OFF ANY PROBLEMS, HOW DIFFICULT HAVE THESE PROBLEMS MADE IT FOR YOU TO DO YOUR WORK, TAKE CARE OF THINGS AT HOME, OR GET ALONG WITH OTHER PEOPLE: VERY DIFFICULT
SUM OF ALL RESPONSES TO PHQ QUESTIONS 1-9: 15

## 2023-12-04 ASSESSMENT — PAIN SCALES - GENERAL: PAINLEVEL: SEVERE PAIN (7)

## 2023-12-04 NOTE — PROGRESS NOTES
[facility]                                     Progress Note    Patient Name: Randi Cleary  Date: 12/4/23         Service Type: Individual     Session Start Time: 4:20 PM Session End Time: 5:16 PM     Session Length: 56 minutes    Session #: 218    Attendees: Client attended alone    Service Modality:  Video Visit:      Provider verified identity through the following two step process.  Patient provided:  Patient is known previously to provider    Telemedicine Visit: The patient's condition can be safely assessed and treated via synchronous audio and visual telemedicine encounter.      Reason for Telemedicine Visit: Patient convenience (e.g. access to timely appointments / distance to available provider)    Originating Site (Patient Location): Patient's home    Distant Site (Provider Location): Provider Remote Setting- Home Office    Consent:  The patient/guardian has verbally consented to: the potential risks and benefits of telemedicine (video visit) versus in person care; bill my insurance or make self-payment for services provided; and responsibility for payment of non-covered services.     Patient would like the video invitation sent by:  My Chart    Mode of Communication:  Video Conference via [facility]    Distant Location (Provider):  Off-site    As the provider I attest to compliance with applicable laws and regulations related to telemedicine.      DATA  Extended Session (53+ minutes): No  Interactive Complexity: No  Crisis: No        Progress Since Last Session (Related to Symptoms / Goals / Homework):   Symptoms:  Patient was more regulated and hopeful       Homework:  Progress made    Check in on family at next session  Get back to the pool  Get the bedrooms set  Journal at least once prior to next session  Schedule shoulder surgery/see Vincent     Episode of Care Goals: Satisfactory progress - ACTION (Actively working towards change); Intervened by reinforcing change plan /  affirming steps taken     Current / Ongoing Stressors and Concerns:   Patient is currently socially isolated. She has a conflictual relationship with her .  She is getting minimal physical activity.  She has had several surgeries.      Treatment Objective(s) Addressed in This Session:   Patient will increase frequency of engaging her in ADLs.  Patient will track and record at least 5 pleasant exchanges with . Patient will be able to identify at least 5 positive traits about her .  Patient will reduce level of depressive and anxious features as evidenced by reduction in score on her CHAVO-7 and PHQ-9 (scores of 15 and 16 at first measurment, respectively).     Intervention:   Supportive Therapy:   Patient processed their appointment with psychiatry. Also discussed their new health tracker journal and how they thought this might help them. Discussed changes they are making in their life- including working on weight loss and how they thing this is tied in with their relationships. Discussed the anger patient is carrying and connected this to family patterns. Remembered stories of family, helped patient notice the sadness she felt and stay in this sadness.        The following assessments were completed by patient for this visit:  PHQ9:       9/18/2023     9:51 AM 9/20/2023    10:10 AM 9/27/2023     4:45 AM 10/9/2023     8:52 AM 10/16/2023     8:25 AM 10/31/2023     4:16 AM 12/4/2023    12:58 AM   PHQ-9 SCORE   PHQ-9 Total Score MyChart 18 (Moderately severe depression) 18 (Moderately severe depression) 17 (Moderately severe depression) 18 (Moderately severe depression) 12 (Moderate depression) 18 (Moderately severe depression) 15 (Moderately severe depression)   PHQ-9 Total Score 18 18 17    17 18 12 18 15    15     GAD7:       8/7/2023     1:03 PM 8/21/2023    11:07 AM 9/5/2023     1:30 PM 9/20/2023     9:59 AM 10/9/2023     8:55 AM 10/31/2023     4:25 AM 12/1/2023    11:37 AM   CHAVO-7 SCORE   Total  Score 18 (severe anxiety) 13 (moderate anxiety) 15 (severe anxiety) 16 (severe anxiety) 15 (severe anxiety) 15 (severe anxiety) 14 (moderate anxiety)   Total Score 18    18 13 15 16    16    16 15 15 14     PROMIS 10-Global Health (only subscores and total score):       7/6/2023     9:12 AM 7/21/2023    10:36 AM 7/28/2023     3:24 PM 8/7/2023     1:07 PM 9/20/2023    10:13 AM 10/31/2023     4:36 AM 12/1/2023    11:52 AM   PROMIS-10 Scores Only   Global Mental Health Score 5    5 5    5  6    6     5    5 6    Global Physical Health Score 8    8 7    7  8    8     9    9 8    PROMIS TOTAL - SUBSCORES 13    13 12    12  14    14     14    14 14        Information is confidential and restricted. Go to Review Flowsheets to unlock data.    Multiple values from one day are sorted in reverse-chronological order        ASSESSMENT: Current Emotional / Mental Status (status of significant symptoms):   Risk status (Self / Other harm or suicidal ideation)   Patient denies current fears or concerns for personal safety.   Patient denies current or recent suicidal ideation or behaviors.   Patient denies current or recent homicidal ideation or behaviors.   Patient denies current or recent self injurious behavior or ideation.   Patient denies other safety concerns.   Patient reports there has been no change in risk factors since their last session.     Patient reports there has been no change in protective factors since their last session.     A safety and risk management plan has been developed including: Patient consented to co-developed safety plan on 11/24/2020, updated 2/20/23.  Safety and risk management plan was reviewed.   Patient agreed to use safety plan should any safety concerns arise.  A copy was made available to the patient.     Appearance:   Appropriate    Eye Contact:   Good    Psychomotor Behavior: Normal    Attitude:   Cooperative    Orientation:   All   Speech    Rate / Production: Normal/  Responsive    Volume:  Normal    Mood:    Anxious    Affect:    Appropriate  Tearful   Thought Content:  Clear    Thought Form:  Coherent    Insight:    Good      Medication Review:   No changes to current psychiatric medication(s)     Medication Compliance:   Yes     Changes in Health Issues:   None reported     Chemical Use Review:   Substance Use: Chemical use reviewed, no active concerns identified Nothing used since 2021.     Tobacco Use: No current tobacco use.      Diagnosis:  1. Bipolar 2 disorder (H)    2. Generalized anxiety disorder    3. Trauma and stressor-related disorder      Collateral Reports Completed:   Not Applicable    PLAN: (Patient Tasks / Therapist Tasks / Other)  Get prepared for [facility] appt.   Check in on family at next session  Get back to the pool  Get the bedrooms set  Journal at least once prior to next session  Schedule shoulder surgery/see [place]      There has been demonstrated improvement in functioning while patient has been engaged in psychotherapy/psychological service- if withdrawn the patient would deteriorate and/or relapse.     MICAELA SLADE, [facility]   2023                                                        ______________________________________________________________________    Individual Treatment Plan    Patient's Name: Randi Cleary  YOB: 1955    Date of Creation: 20  Date Treatment Plan Last Reviewed/Revised: 23    DSM5 Diagnoses: 296.89 Bipolar II Disorder Depressed, 300.02 (F41.1) Generalized Anxiety Disorder, or Adjustment Disorders  309.89 (F43.8) Other Specified Trauma and Stressor Related Disorder  Psychosocial / Contextual Factors: Patient's entire family of origin has , she now has a sister-in-law and  as support.  Relationship with  is conflictual. She is recovering from surgeries  PROMIS (reviewed every 90 days): PROMIS-10 Scores  PROMIS 10-Global Health (only subscores and total  "score):   PROMIS-10 Scores Only 12/14/2021 3/15/2022 3/15/2022 3/15/2022 3/24/2022 4/1/2022 6/9/2022   Global Mental Health Score 6 6 6 6 8 12 12   Global Physical Health Score 9 9 9 9 8 11 10   PROMIS TOTAL - SUBSCORES 15 15 15 15 16 23 22       Referral / Collaboration:  Referral to another professional/service is not indicated at this time..    Anticipated number of session for this episode of care: 50  Anticipation frequency of session: Biweekly  Anticipated Duration of each session: 53 or more minutes due to intensity of trauma symptoms  Treatment plan will be reviewed in 90 days or when goals have been changed.   There has been demonstrated improvement in functioning while patient has been engaged in psychotherapy/psychological service- if withdrawn the patient would deteriorate and/or relapse.       MeasurableTreatment Goal(s) related to diagnosis / functional impairment(s)  Goal 1: Patient will \"jumpstart, getting going with the things I need to be doing around the house as far as picking up, doing things, trying to do something every day.  Also to lessen the animosity between me and my .\"    I will know I've met my goal when my shoulders are fixed and I can see.      Objective #A (Patient Action)    Patient will  increase frequency of engaging her in ADLs .  Status: Continued - Date(s): 12/10/21, 3/9/22, 6/9/22, 9/2/22, 12/1/22, 3/2/23, 6/6/23, 9/13/23    Intervention(s)  Therapist will  engage patient in CBT, specifically behavioral activation .    Objective #B  Patient will   track and record at least 5 pleasant exchanges with . Patient will be able to identify at least 5 positive traits about her  and how he relates to her .  Status: Continued - Date(s): 12/10/21, 3/9/22, 6/9/22, 9/2/22, 12/1/22, 3/2/23, 6/6/23, 9/13/23    Intervention(s)  Therapist will  teach assertiveness skills and assign homework related to relationship interactions .    Objective #C  Patient will  reduce level " "of depressive and anxious features as evidenced by reduction in score on her CHAVO-7 and PHQ-9 (scores of 15 and 16 at first measurment, respectively) .  Status: Continued - Date(s): 12/10/21, 3/9/22, 6/9/22, 9/2/22, 12/1/22, 3/2/23, 6/6/23, 9/13/23    Intervention(s)  Therapist will  engage patient in person-centered therapy and CBT .    Patient has reviewed and agreed to the above plan.      MICAELA SLADE, [facility]  September 13, 2023                                                   Randi Cleary          SAFETY PLAN:  Step 1: Warning signs / cues (Thoughts, images, mood, situation, behavior) that a crisis may be developing:  Thoughts: \"I don't want to continue\" \"I am unwanted\"  Images: none  Thinking Processes: ruminating  Mood: anger  Behaviors: isolating/withdrawing , can't stop crying, not taking care of myself and not taking care of my responsibilities  Situations: small triggers, such as not being able to find something, or dropping something   Step 2: Coping strategies - Things I can do to take my mind off of my problems without contacting another person (relaxation technique, physical activity):  Distress Tolerance Strategies:  arts and crafts: drawing, play with my pet , listen to positive and upbeat music: any, change body temperature (ice pack/cold water)  and paced breathing/progressive muscle relaxation  Physical Activities: go for a walk, deep breathing and stretching   Focus on helpful thoughts:  \"You've been through this before, you can get through it again.\"  Step 3: People and social settings that provide distraction:                 Name: Carmen                            Name: Darien                           Name: Aleida       pool, shopping, Carmen's house, [employer]       Step 4: Remind myself of people and things that are important to me and worth living for:  Clifford Littel Donna, post-COVID world, options of what could be in your future        Step 5: When I am in crisis, I can ask these " people to help me use my safety plan:                 Name: Sidney  Step 6: Making the environment safe:   go to sleep/daydream  Step 7: Professionals or agencies I can contact during a crisis:  [facility] Daytime Number: 940-616-6786  Suicide Prevention Lifeline: 2-001-459-TALK (8255)  Crisis Text Line Service (available 24 hours a day, 7 days a week): Text [place] to 837351  Local Crisis Services: [facility] Crisis: 201.764.4032  Adults can always access to the emPATH unit at [facility] (no phone number, utilize it like an urgent care or ER where you just show up)     Call 911 or go to my nearest emergency department.       I helped develop this safety plan and agree to use it when needed.  I have been given a copy of this plan.       Client signature _________________________________________________________________  Today s date:  11/24/2020  Adapted from Safety Plan Template 2008 Randi Poole and Robby Barba is reprinted with the express permission of the authors.  No portion of the Safety Plan Template may be reproduced without the express, written permission.  You can contact the authors at [email or web address] or [email or web address].

## 2023-12-05 NOTE — PATIENT INSTRUCTIONS
Thank you for coming to the [facility] [place] [facility].    Lab Testing:  If you had lab testing today and your results are reassuring or normal they will be mailed to you or sent through [employer] within 7 days. If the lab tests need quick action we will call you with the results. The phone number we will call with results is # [phone] (home) . If this is not the best number please call our clinic and change the number.    Medication Refills:  If you need any refills please call your pharmacy and they will contact us. Our fax number for refills is [phone]. Please allow three business for refill processing. If you need to  your refill at a new pharmacy, please contact the new pharmacy directly. The new pharmacy will help you get your medications transferred.     Scheduling:  If you have any concerns about today's visit or wish to schedule another appointment please call our office during normal business hours [phone] (8-5:00 M-F)    Contact Us:  Please call [phone] during business hours (8-5:00 M-F).  If after clinic hours, or on the weekend, please call  [phone].    Financial Assistance [phone]  [facility] Billing [phone]  Central Billing Office, [facility]: [phone]  [place] Billing [phone]  Medical Records [phone]      MENTAL HEALTH CRISIS NUMBERS:  For a medical emergency please call  911 or go to the nearest ER.     [facility]:   [facility] -[phone]   Crisis Residence [facility] -[phone]   Walk-In Counseling [facility] -[phone]   COPE 24/7 [place] Mobile Team -[phone] (adults)/[phone] (child)  CHILD: [facility] needs assessment team - [phone]      [facility]:   [facility] - [phone]   Walk-in counseling [facility] - [phone]   Walk-in counseling [facility] - [phone]   Crisis Residence [facility]  [facility] - [phone]  Urgent Care Adult Mental [facility]-[phone] mobile unit/ 24/7 crisis line    National Crisis Numbers:   National Suicide Prevention Lifeline: 8-193-714-TALK (929-087-8213)  Poison Control Center - 3-190-815-2799  TapTrack/resources for a list of additional resources (SOS)  Trans Lifeline a hotline for transgender people 1-416-306-1971  The [employer] Project a hotline for LGBT youth 2-283-331-2238  Crisis Text Line: For any crisis 24/7   To: 343804  see www.crisistextline.org  - IF MAKING A CALL FEELS TOO HARD, send a text!         Again thank you for choosing [facility] [place] [facility] and please let us know how we can best partner with you to improve you and your family's health.    You may be receiving a survey regarding this appointment. We would love to have your feedback, both positive and negative. The survey is done by an external company, so your answers are anonymous.

## 2023-12-07 ENCOUNTER — PATIENT OUTREACH (OUTPATIENT)
Dept: CARE COORDINATION | Facility: CLINIC | Age: 68
End: 2023-12-07
Payer: MEDICARE

## 2023-12-07 NOTE — PROGRESS NOTES
Clinic Care Coordination Contact  Clinic Care Coordination Contact  OUTREACH    Referral Information: Jeannette Mendoza, APRN, CNP     Chief Complaint   Patient presents with    Clinic Care Coordination - Initial      Universal Utilization:    Utilization      No Show Count (past year)  17             ED Visits  0             Hospital Admissions  0                    Current as of: 12/5/2023 10:14 PM              Clinical Concerns:  Current Medical Concerns: Did not discuss.    Current Behavioral Concerns: [facility] contacted patient to follow up on referral placed by Jeannette Mendoza. Patient states that she is looking for help obtaining past medical records from Dr. Pastor Serrano. Patient states that she has a letter from [employer], and she has contacted them several times with no luck. Patient plans to call the clinic number directly. She will let me know what she finds out.      Education Provided to patient: N/A     Medication Management:  Medication review status: Did not discuss.    Lifestyle & Psychosocial Needs:    Social Determinants of Health     Food Insecurity: Low Risk  (11/16/2023)    Food Insecurity     Within the past 12 months, did you worry that your food would run out before you got money to buy more?: No     Within the past 12 months, did the food you bought just not last and you didn t have money to get more?: No   Depression: At risk (12/4/2023)    PHQ-2     PHQ-2 Score: 5   Housing Stability: High Risk (11/16/2023)    Housing Stability     Do you have housing? : Yes     Are you worried about losing your housing?: Yes   Tobacco Use: Medium Risk (12/4/2023)    Patient History     Smoking Tobacco Use: Former     Smokeless Tobacco Use: Never     Passive Exposure: Not on file   Financial Resource Strain: Low Risk  (11/16/2023)    Financial Resource Strain     Within the past 12 months, have you or your family members you live with been unable to get utilities (heat, electricity) when it was really needed?: No    Alcohol Use: Not on file   Transportation Needs: High Risk (11/16/2023)    Transportation Needs     Within the past 12 months, has lack of transportation kept you from medical appointments, getting your medicines, non-medical meetings or appointments, work, or from getting things that you need?: Yes   Physical Activity: Not on file   Interpersonal Safety: Low Risk  (9/25/2023)    Interpersonal Safety     Do you feel physically and emotionally safe where you currently live?: Yes     Within the past 12 months, have you been hit, slapped, kicked or otherwise physically hurt by someone?: No     Within the past 12 months, have you been humiliated or emotionally abused in other ways by your partner or ex-partner?: No   Stress: Not on file   Social Connections: Not on file     Patient/Caregiver understanding: Patient verbalized understanding, engaged in AIDET communication during patient encounter.     Future Appointments                Tomorrow B Mary Kay Weir LICSW [facility] Addiction [facility]    In 4 days Deborah Barba LICSW [facility]    In 1 week SPMW CCC RN [facility], [facility]    In 1 week B Mary Kay Weir LICHEIKE [facility]    In 1 week B Mary Kay Weir LICSW [facility]    In 1 week B Mary Kay Weir LICHEIKE [facility]    In 1 week Francisco J Edwards MD [facility]    In 1 week Ekaterina Horton RD [facility]    In 2 weeks B Mary Kay Weir LICSW [facility]    In 3 weeks Amy [facility]    In 3 weeks B Carmella  Mary Kay Gomez, LICSW [facility] Addiction Clinic [facility]    In 3 weeks Alee Coker MD [facility] Endocrinology [facility]    In 1 month Jeannette Mendoza APRN CNP [facility] Mental [facility] & Addiction [facility]    In 1 month Laith Constantino MD [facility], [facility]    In 1 month B Mary Kay Weir, LICSW [facility]    In 1 month B Mary Kay Weir, LICSW [facility]    In 1 month B Mary Kay Weir, LICSW [facility]    In 1 month [facility]    In 1 month Tova Pablo MD [facility]    In 1 month B Mary Kay Weir, LICSW [facility]    In 1 month B Mary Kay Weir, LICSW [facility]    In 1 month [facility]are[facility]    In 1 month B Mary Kay Weir LICSW [facility] Addiction [facility]    In 2 months Sharmila Gregory MD [facility]          Plan: [facility] will follow up with patient in one month.    TANIKA Walls  Clinic Care Coordination  [facility]  [email or web address]  [phone]

## 2023-12-08 ENCOUNTER — VIRTUAL VISIT (OUTPATIENT)
Dept: PSYCHOLOGY | Facility: CLINIC | Age: 68
End: 2023-12-08
Payer: MEDICARE

## 2023-12-08 DIAGNOSIS — F43.9 TRAUMA AND STRESSOR-RELATED DISORDER: ICD-10-CM

## 2023-12-08 DIAGNOSIS — F31.81 BIPOLAR 2 DISORDER (H): Primary | ICD-10-CM

## 2023-12-08 DIAGNOSIS — F41.1 GENERALIZED ANXIETY DISORDER: ICD-10-CM

## 2023-12-08 PROCEDURE — 90834 PSYTX W PT 45 MINUTES: CPT | Mod: VID | Performed by: SOCIAL WORKER

## 2023-12-08 NOTE — PROGRESS NOTES
[facility]                                     Progress Note    Patient Name: Randi Cleary  Date: 12/8/23         Service Type: Individual     Session Start Time: 11:03 AM Session End Time: 11:50 AM     Session Length: 47 minutes    Session #: 219    Attendees: Client attended alone    Service Modality:  Video Visit:      Provider verified identity through the following two step process.  Patient provided:  Patient is known previously to provider    Telemedicine Visit: The patient's condition can be safely assessed and treated via synchronous audio and visual telemedicine encounter.      Reason for Telemedicine Visit: Patient convenience (e.g. access to timely appointments / distance to available provider)    Originating Site (Patient Location): Patient's home    Distant Site (Provider Location): Provider Remote Setting- Home Office    Consent:  The patient/guardian has verbally consented to: the potential risks and benefits of telemedicine (video visit) versus in person care; bill my insurance or make self-payment for services provided; and responsibility for payment of non-covered services.     Patient would like the video invitation sent by:  My Chart    Mode of Communication:  [facility] Conference [facility]    Distant Location (Provider):  Off-site    As the provider I attest to compliance with applicable laws and regulations related to telemedicine.      DATA  Extended Session (53+ minutes): No  Interactive Complexity: No  Crisis: No        Progress Since Last Session (Related to Symptoms / Goals / Homework):   Symptoms:  Patient continues to be more regulated than previous week       Homework:  Progress made    Get prepared for [employer] appt. -done  Check in on family at next session  Get back to the pool  Get the bedrooms set  Journal at least once prior to next session  Schedule shoulder surgery/see Vincent -scheduled Sherwin 3     Episode of Care Goals: Satisfactory progress - ACTION  (Actively working towards change); Intervened by reinforcing change plan / affirming steps taken     Current / Ongoing Stressors and Concerns:   Patient is currently socially isolated. She has a conflictual relationship with her .  She is getting minimal physical activity.  She has had several surgeries.      Treatment Objective(s) Addressed in This Session:   Patient will increase frequency of engaging her in ADLs.  Patient will track and record at least 5 pleasant exchanges with . Patient will be able to identify at least 5 positive traits about her .  Patient will reduce level of depressive and anxious features as evidenced by reduction in score on her CHAVO-7 and PHQ-9 (scores of 15 and 16 at first measurment, respectively).     Intervention:   Supportive Therapy:   Processed changes with their health and how they are feeling about next steps. Discussed her decision regarding whether or not to start medication, specifically Lamictal. Discussed their past experience on Lamictal. Talked about what they were weighing in making this decision. Discussed getting her psychiatry records. Reviewed history of medical problems and how this has impacted her.      The following assessments were completed by patient for this visit:  PHQ9:       9/18/2023     9:51 AM 9/20/2023    10:10 AM 9/27/2023     4:45 AM 10/9/2023     8:52 AM 10/16/2023     8:25 AM 10/31/2023     4:16 AM 12/4/2023    12:58 AM   PHQ-9 SCORE   PHQ-9 Total Score MyChart 18 (Moderately severe depression) 18 (Moderately severe depression) 17 (Moderately severe depression) 18 (Moderately severe depression) 12 (Moderate depression) 18 (Moderately severe depression) 15 (Moderately severe depression)   PHQ-9 Total Score 18 18 17    17 18 12 18 15    15     GAD7:       8/7/2023     1:03 PM 8/21/2023    11:07 AM 9/5/2023     1:30 PM 9/20/2023     9:59 AM 10/9/2023     8:55 AM 10/31/2023     4:25 AM 12/1/2023    11:37 AM   CHAVO-7 SCORE   Total  Score 18 (severe anxiety) 13 (moderate anxiety) 15 (severe anxiety) 16 (severe anxiety) 15 (severe anxiety) 15 (severe anxiety) 14 (moderate anxiety)   Total Score 18    18 13 15 16    16    16 15 15 14     PROMIS 10-Global Health (only subscores and total score):       7/6/2023     9:12 AM 7/21/2023    10:36 AM 7/28/2023     3:24 PM 8/7/2023     1:07 PM 9/20/2023    10:13 AM 10/31/2023     4:36 AM 12/1/2023    11:52 AM   PROMIS-10 Scores Only   Global Mental Health Score 5    5 5    5  6    6     5    5 6    Global Physical Health Score 8    8 7    7  8    8     9    9 8    PROMIS TOTAL - SUBSCORES 13    13 12    12  14    14     14    14 14        Information is confidential and restricted. Go to Review Flowsheets to unlock data.    Multiple values from one day are sorted in reverse-chronological order        ASSESSMENT: Current Emotional / Mental Status (status of significant symptoms):   Risk status (Self / Other harm or suicidal ideation)   Patient denies current fears or concerns for personal safety.   Patient denies current or recent suicidal ideation or behaviors.   Patient denies current or recent homicidal ideation or behaviors.   Patient denies current or recent self injurious behavior or ideation.   Patient denies other safety concerns.   Patient reports there has been no change in risk factors since their last session.     Patient reports there has been no change in protective factors since their last session.     A safety and risk management plan has been developed including: Patient consented to co-developed safety plan on 11/24/2020, updated 2/20/23.  Safety and risk management plan was reviewed.   Patient agreed to use safety plan should any safety concerns arise.  A copy was made available to the patient.     Appearance:   Appropriate    Eye Contact:   Good    Psychomotor Behavior: Normal    Attitude:   Cooperative    Orientation:   All   Speech    Rate / Production: Normal/  Responsive    Volume:  Normal    Mood:    Anxious    Affect:    Appropriate  Tearful   Thought Content:  Clear    Thought Form:  Coherent    Insight:    Good      Medication Review:   No changes to current psychiatric medication(s)     Medication Compliance:   Yes     Changes in Health Issues:   None reported     Chemical Use Review:   Substance Use: Chemical use reviewed, no active concerns identified Nothing used since 2021.     Tobacco Use: No current tobacco use.      Diagnosis:  1. Bipolar 2 disorder (H)    2. Generalized anxiety disorder    3. Trauma and stressor-related disorder        Collateral Reports Completed:   Not Applicable    PLAN: (Patient Tasks / Therapist Tasks / Other)  [facility]  Check in on family at next session  Get back to the pool  Get the bedrooms set  Journal at least once prior to next session      There has been demonstrated improvement in functioning while patient has been engaged in psychotherapy/psychological service- if withdrawn the patient would deteriorate and/or relapse.     MICAELA SLADE, [facility]   2023                                                        ______________________________________________________________________    Individual Treatment Plan    Patient's Name: Randi Cleary  YOB: 1955    Date of Creation: 20  Date Treatment Plan Last Reviewed/Revised: 23    DSM5 Diagnoses: 296.89 Bipolar II Disorder Depressed, 300.02 (F41.1) Generalized Anxiety Disorder, or Adjustment Disorders  309.89 (F43.8) Other Specified Trauma and Stressor Related Disorder  Psychosocial / Contextual Factors: Patient's entire family of origin has , she now has a sister-in-law and  as support.  Relationship with  is conflictual. She is recovering from surgeries  PROMIS (reviewed every 90 days): PROMIS-10 Scores  PROMIS 10-Global Health (only subscores and total score):   PROMIS-10 Scores Only  "12/14/2021 3/15/2022 3/15/2022 3/15/2022 3/24/2022 4/1/2022 6/9/2022   Global Mental Health Score 6 6 6 6 8 12 12   Global Physical Health Score 9 9 9 9 8 11 10   PROMIS TOTAL - SUBSCORES 15 15 15 15 16 23 22       Referral / Collaboration:  Referral to another professional/service is not indicated at this time..    Anticipated number of session for this episode of care: 50  Anticipation frequency of session: Biweekly  Anticipated Duration of each session: 53 or more minutes due to intensity of trauma symptoms  Treatment plan will be reviewed in 90 days or when goals have been changed.   There has been demonstrated improvement in functioning while patient has been engaged in psychotherapy/psychological service- if withdrawn the patient would deteriorate and/or relapse.       MeasurableTreatment Goal(s) related to diagnosis / functional impairment(s)  Goal 1: Patient will \"jumpstart, getting going with the things I need to be doing around the house as far as picking up, doing things, trying to do something every day.  Also to lessen the animosity between me and my .\"    I will know I've met my goal when my shoulders are fixed and I can see.      Objective #A (Patient Action)    Patient will  increase frequency of engaging her in ADLs .  Status: Continued - Date(s): 12/10/21, 3/9/22, 6/9/22, 9/2/22, 12/1/22, 3/2/23, 6/6/23, 9/13/23    Intervention(s)  Therapist will  engage patient in CBT, specifically behavioral activation .    Objective #B  Patient will   track and record at least 5 pleasant exchanges with . Patient will be able to identify at least 5 positive traits about her  and how he relates to her .  Status: Continued - Date(s): 12/10/21, 3/9/22, 6/9/22, 9/2/22, 12/1/22, 3/2/23, 6/6/23, 9/13/23    Intervention(s)  Therapist will  teach assertiveness skills and assign homework related to relationship interactions .    Objective #C  Patient will  reduce level of depressive and anxious " "features as evidenced by reduction in score on her CHAVO-7 and PHQ-9 (scores of 15 and 16 at first measurment, respectively) .  Status: Continued - Date(s): 12/10/21, 3/9/22, 6/9/22, 9/2/22, 12/1/22, 3/2/23, 6/6/23, 9/13/23    Intervention(s)  Therapist will  engage patient in person-centered therapy and CBT .    Patient has reviewed and agreed to the above plan.      MICAELA SLADE, [facility]  September 13, 2023                                                   Randi Cleary          SAFETY PLAN:  Step 1: Warning signs / cues (Thoughts, images, mood, situation, behavior) that a crisis may be developing:  Thoughts: \"I don't want to continue\" \"I am unwanted\"  Images: none  Thinking Processes: ruminating  Mood: anger  Behaviors: isolating/withdrawing , can't stop crying, not taking care of myself and not taking care of my responsibilities  Situations: small triggers, such as not being able to find something, or dropping something   Step 2: Coping strategies - Things I can do to take my mind off of my problems without contacting another person (relaxation technique, physical activity):  Distress Tolerance Strategies:  arts and crafts: drawing, play with my pet , listen to positive and upbeat music: any, change body temperature (ice pack/cold water)  and paced breathing/progressive muscle relaxation  Physical Activities: go for a walk, deep breathing and stretching   Focus on helpful thoughts:  \"You've been through this before, you can get through it again.\"  Step 3: People and social settings that provide distraction:                 Name: Carmen                            Name: Darien                           Name: Aleida       pool, shopping, Carmen's house, [employer]       Step 4: Remind myself of people and things that are important to me and worth living for:  Clifford Little Donna, post-COVID world, options of what could be in your future        Step 5: When I am in crisis, I can ask these people to help me use my " safety plan:                 Name: Sidney  Step 6: Making the environment safe:   go to sleep/daydream  Step 7: Professionals or agencies I can contact during a crisis:  [facility] Daytime Number: [phone]  Suicide Prevention Lifeline: 8-719-459-VYRQ (8255)  Crisis Text Line Service (available 24 hours a day, 7 days a week): Text [place] to 712225  Local Crisis Services: [facility] Crisis: [phone]  Adults can always access to the emPATH unit at [facility] (no phone number, utilize it like an urgent care or ER where you just show up)     Call 911 or go to my nearest emergency department.       I helped develop this safety plan and agree to use it when needed.  I have been given a copy of this plan.       Client signature _________________________________________________________________  Today s date:  11/24/2020  Adapted from Safety Plan Template 2008 Randi Poole and Robby Barba is reprinted with the express permission of the authors.  No portion of the Safety Plan Template may be reproduced without the express, written permission.  You can contact the authors at [email or web address] or [email or web address].

## 2023-12-11 ENCOUNTER — VIRTUAL VISIT (OUTPATIENT)
Dept: PSYCHIATRY | Facility: CLINIC | Age: 68
End: 2023-12-11
Payer: MEDICARE

## 2023-12-11 VITALS — WEIGHT: 214 LBS | BODY MASS INDEX: 34.54 KG/M2

## 2023-12-11 DIAGNOSIS — F33.2 SEVERE EPISODE OF RECURRENT MAJOR DEPRESSIVE DISORDER, WITHOUT PSYCHOTIC FEATURES (H): Primary | ICD-10-CM

## 2023-12-11 ASSESSMENT — PAIN SCALES - GENERAL: PAINLEVEL: EXTREME PAIN (8)

## 2023-12-11 NOTE — NURSING NOTE
Pt unable to complete PHQ-9 a this time.     Is the patient currently in the state of [place]? YES    Visit mode:VIDEO    If the visit is dropped, the patient can be reconnected by: VIDEO VISIT: Text to cell phone:   Telephone Information:   Mobile [phone]       Will anyone else be joining the visit? NO  (If patient encounters technical issues they should call [phone]    How would you like to obtain your AVS? MyChart    Are changes needed to the allergy or medication list? Pt stated no changes to allergies and Pt stated no med changes    Reason for visit: Consult    Jerry KURTZ

## 2023-12-11 NOTE — PROGRESS NOTES
"[facility] Treatment Resistant Depression Program  Diagnostic Assessment  A part of the [facility] Psychiatry Mood Disorders Program    Randi Cleary MRN# [id number]   Age: 68 year old YOB: 1955     Date of Evaluation: 12/11/23  Start Time: 10:04; End Time: 11:45    Mode of communication: [employer] (HIPAA compliant, secure platform). Patient consented verbally to this mode of therapy today.  Reason for telehealth: COVID-19. This patient visit was converted to a telehealth visit to minimize exposure to COVID-19.    Originating Location (patient location): home, located in [place]  Distant Location (provider location): Home office, located in [place], [place], using appropriate privacy considerations and procedures         Care Team     PCP- Laith Constantino  Specialty Providers- no  Therapist-  Mary Kay Weir [facility]  Psychiatric Med Management Provider-  Dr Dubois  Other Mental Health Providers- no    Referred by:   Dr Das  Referred for evaluation of:  depression.         Contributors to the Assessment     Chart Reviewed.   Interview completed with Randi Cleary.  Releases of information signed?  no  Collateral information obtained? no           Chief Complaint     \"I've been on a year search for care.\"        Psychiatric History and Present Illness      Randi Cleary is a 68 year old female who goes by Winnie and uses she, her, hers pronouns.    Randi reports one lifetime episode(s) of depression and has a history of psychiatric hospitalizations in 1998 or 1999.    She reports that as early as elementary school she felt depressed - \"I always knew felt off. Always had trouble with friendships.\"    Winnie reports that since then there has not been a period of at least two months with full resolution of symptoms.    Current psychosocial stressors include social isolation/limited support, chronic pain, complex medical issues    Bars is interested in learning more about " "interventional treatment.      Past diagnoses: MDD, bipolar 2, trauma and stress-related disorder, CHAVO, alcohol use disorder    Past medication trials: multiple trials    Hospitalizations: 1998    Commitment: No, Current [place] order: No    ECT trials: No    TMS trials:  No      Ketamine:  Yes - Prescribed a compounded gel/gummy by pain specialist at [facility]    Suicide attempts: Yes - one, 1998 or '99    Self-injurious behavior: Yes - history of cutting, none currently    Aggressive or violent behavior: Yes - reports intense anger that is difficult to manage or control    Past Outpatient Programs & Services  Medication management, Outpatient individual psychotherapy, Intensive outpatient program (IOP), and Substance use treatment    Psych critical item history suicide attempt , suicidal ideation, mutiple psychotropic trials , psych hosp , ketamine, and major medical problems    Other mental health concerns discussed: anxiety, trauma, chronic pain    Sleep study: yes, has a CPAP that she hasn't been using since her last surgery    ADHD testing: none    Current Outpatient Programs & Services  Medication management and Outpatient individual psychotherapy         Psychiatric Review of Systems (Completed M.I.N.I. Version 7.0.2)     A. DEPRESSION  Past 2 Weeks:  low mood nearly every day, anhedonia most of the time, appetite change (unspecified), difficulties with sleep, low energy, worthlessness and/or guilt, difficulty concentrating, thinking or making decisions, and suicidal ideation with plan, without intent    Past Episode:  low mood nearly every day, anhedonia most of the time, difficulties with sleep, low energy, worthlessness and/or guilt, difficulty concentrating, thinking or making decisions, and suicidal ideation with plan, without intent    B. SUICIDALITY:  Risk: Medium  Current suicidal ideation: Yes, reports a plan, and denies intent.   \"  -reports \"I don't know. I'm holding on for something, but " "I don't know what\"in response to \"How likely are to you to try to kill yourself within the next 3 months on a scale from 0-100%?\"  -denies current SIB/Self Injurious Behavior and reports past SIB    -reports one lifetime suicide attempts.  She has notable risk factors for self-harm including previous suicide attempt, feels trapped, hopelessness, lives alone/ isolated, severe anxiety, and new/ worsening medical issue.  However, risk is mitigated by future oriented, none to minimal alcohol use , commitment to family, and stable housing.      C. FLOYD/HYPOMANIA  Current Episode:  none    Past Episode:  elevated mood/energy, need less sleep, pressured speech, racing thoughts, increased drive, and risk taking    D. PANIC:   Winnie reports intense anxiety/panic most days, but it is always provoked or triggered by something     E. AGORAPHOBIA:  marked fear/anxiety in crowds and alone away from home because of fear that escape might be difficult or help might not be available;, almost always, with active avoidance, a  or are endured with intense anxiety/fear, at a level out of proportion to the actual danger posed, lasting 6 months or more, and causing clinically significant distress or impairement in social, occupation, or other important areas of functioning     F. SOCIAL ANXIETY:  marked fear/anxiety in participating in small groups, speaking to authority figures, attending parties, and eating in front of others out of fear that he/she will act in a way or show anxiety symptoms that will be negatively evaluated, almost always, with active avoidance, a  or are endured with intense anxiety/fear, at a level out of proportion to the actual danger posed, lasting 6 months or more, and causing clinically significant distress or impairement in social, occupation, or other important areas of functioning     G. OBSESSIVE-COMPULSIVE:  obsessions \"Kind of ruminations\" about social interactions and friends that are " "\"distracting\"    H. TRAUMA:  experienced traumatic event and re-experienced trauma  Winnie reports that during IOP, PTSD was suggested as an appropriate diagnosis. Chart review indicates a diagnosis of trauma and stress-related disorder from 6/2023    I. ALCOHOL & J. NON-ALCOHOL:  See below    K. PSYCHOSIS:    did not assess due to time    L-M. EATING DISORDER: none    N. GENERALIZED ANXIETY:  excessive anxiety or worry about several routine things, most days, with difficulty controlling worry, feel restless, keyed up or on edge, difficulty concentrating or mind goes blank, irritability, and difficulty sleeping    O. RULE OUT MEDICAL, ORGANIC OR DRUG CAUSES FOR ALL DISORDERS  During any current disorder or past mood episode, patient reports:  A. Substance use or withdrawal: Yes  B. Medical illness: Yes    P. ANTISOCIAL PERSONALITY:   did not assess due to time      Other Cluster B Traits Identified (not formally assessed):  none discussed    SUBSTANCE USE HISTORY                                                                 CAGE-AID    Have you felt you ought to cut down on your drinking or drug use? yes    Have people annoyed you by criticizing your drinking or drug use? no    Have you felt bad or guilty about your drinking or drug use? yes    Have you ever had a drink or used drugs first thing in the morning to steady your nerves or to get rid of a hangover? no    CAGE-AID SCORE = 2 - completed AUD treatment 20+ years ago, sober since      RECENT SUBSTANCE USE:     TOBACCO- none  CAFFEINE-  two coffees/day, maybe also a Diet Coke  ALCOHOL- none     CANNABIS- takes gummies to help with sleep            OTHER ILLICIT DRUGS- none    Past Use-   TOBACCO- quit in 1999  CAFFEINE-  coffee or soda/daily  ALCOHOL- history of AUD, completeds treatment and had been sober for decades  CANNABIS- in her 20s     OTHER ILLICIT DRUGS- in her 20s    CD Treatment history: Yes   Medical consequences due to use (eg HIV/Hepatitis)- " "No  Legal consequences due to use- none reported    SOCIAL and FAMILY HISTORY                                                             Randi Cleary is a 68 year old   female with a psychiatric history of depression, sebastian, anxiety, trauma who presents for a [facility] Treatment Resistant Depression program evaluation. Randi was referred by Dr Syed.     Living situation: Randi lives with her  in a Private Residence.   Kids? No  Pets? Yes - Clifford the cat  Guns, weapons, or other means to harm oneself in the home? No     Education: Randi s highest level of education is some college and associate degree / vocational certificate    Occupation: Randi is currently retired/disabled    Finances: Randi is financial supported by SSI, Supplemental Security Income, SSDI, Social Security Disability Income, and spouse's income    Relationships (kid/grandkids, spouse/partner, friends, support people): Specific Relationships & Quality of Relationship: Winnie reports she does not have a lot of people she is close with or whom she can go to for support. She has a friend she has known since childhood and her sister in law who she's close with. She notes that interpersonal interactions and relationships have always been difficult to \"maneuver.\" In particular, she reports across time she has helped and looked out for people in her life but has not experienced the same in return.    Spiritual considerations: No    Legal History: No    Trauma/Abuse History: Yes - when Winnie was in IOP, Dr Das suggested PTSD      History: No    Family Mental Health History: none reported    Strengths & Coping Strategies:  Winnie is pleasant and easy to engage. She is actively seeking additional treatment.    PAST PSYCH MED TRIALS      Will be reviewed during MTM.    MEDICAL / SURGICAL HISTORY                                   Patient Active Problem List   Diagnosis    DJD (degenerative joint " disease), ankle and foot    Hereditary hemochromatosis (H24)    Pulmonary hypertension (H)    Postablative hypothyroidism    Class 2 obesity due to excess calories without serious comorbidity in adult    KYAW (obstructive sleep apnea)    Type 2 diabetes mellitus without complication, without long-term current use of insulin (H)    Hypokalemia    COPD (chronic obstructive pulmonary disease) (H)    Generalized anxiety disorder    Restless leg syndrome    Vitamin B12 deficiency    Vitamin D deficiency    Bipolar 2 disorder (H)    Morbid obesity (H)    History of cervical spinal surgery    Cervical stenosis of spinal canal    Alcohol use disorder, moderate, in sustained remission (H)    Essential hypertension    Psoriatic arthropathy (H)    Primary osteoarthritis involving multiple joints    Gastroesophageal reflux disease with esophagitis without hemorrhage    Numbness in both hands    Chronic, continuous use of opioids    Trauma and stressor-related disorder       Past Surgical History:   Procedure Laterality Date    ARTHRODESIS ANKLE      ARTHROPLASTY ANKLE Right 6/29/2015    Procedure: ARTHROPLASTY ANKLE;  Surgeon: Jason Coughlin MD;  Location: [facility]    ARTHROPLASTY REVISION ANKLE Right 6/29/2015    Procedure: ARTHROPLASTY REVISION ANKLE;  Surgeon: Jason Coughlin MD;  Location: [facility]    BIOPSY BREAST      BREAST BIOPSY, CORE RT/LT      COLONOSCOPY      COLONOSCOPY N/A 2/25/2021    Procedure: COLONOSCOPY;  Surgeon: Guru Elke Tolbert MD;  Location:  GI    CV CORONARY ANGIOGRAM N/A 10/3/2019    Procedure: CV CORONARY ANGIOGRAM;  Surgeon: Bryce Pierre MD;  Location:  HEART CARDIAC CATH LAB    CV RIGHT HEART CATH MEASUREMENTS RECORDED N/A 10/3/2019    Procedure: CV RIGHT HEART CATH;  Surgeon: Bryce Pierre MD;  Location:  HEART CARDIAC CATH LAB    ESOPHAGOSCOPY, GASTROSCOPY, DUODENOSCOPY (EGD), COMBINED N/A 2/25/2021    Procedure: ESOPHAGOGASTRODUODENOSCOPY, WITH  BIOPSY;  Surgeon: Guru Elke Tolbert MD;  Location:  GI    EYE SURGERY  2021    HC REMOVE TONSILS/ADENOIDS,<13 Y/O      Description: Tonsillectomy With Adenoidectomy;  Recorded: 04/07/2010;    IR LUMBAR EPIDURAL STEROID INJECTION  10/26/2004    IR LUMBAR EPIDURAL STEROID INJECTION  11/16/2004    IR LUMBAR EPIDURAL STEROID INJECTION  12/21/2004    IR LUMBAR EPIDURAL STEROID INJECTION  6/8/2006    JOINT REPLACEMENT      LAMINOPLASTY CERVICAL POSTERIOR THREE+ LEVELS Left 12/21/2021    Procedure: CERVICAL 3-CERVICAL 6 LEFT OPEN DOOR LAMINOPLASTY AND LEFT CERVICAL 4-5 AND CERVICAL 6-7 POSTERIOR FORAMINOTOMY;  Surgeon: Angela Gregory MD;  Location: [facility]    LAPAROSCOPIC ADRENALECTOMY Left 08/02/2017    pheochromocytoma    LAPAROSCOPIC ADRENALECTOMY Left 8/2/2017    Procedure: LAPAROSCOPIC LEFT ADRENALECTOMY, ;  Surgeon: Gab Linares MD;  Location: [facility];  Service:     LENGTHEN TENDON ACHILLES Right 6/29/2015    Procedure: LENGTHEN TENDON ACHILLES;  Surgeon: Jason Coughlin MD;  Location: [facility]    LUMPECTOMY BREAST      LUMPECTOMY BREAST Left 1994    MAMMOPLASTY REDUCTION Right 01/13/2015    Micro    MAMMOPLASTY REDUCTION Right     approx late 2015/early2016    MASTECTOMY      left lumpectomy with axillary node dissection    MASTECTOMY MODIFIED RADICAL      OTHER SURGICAL HISTORY Right     reconstructive breast surgery    OTHER SURGICAL HISTORY      Adrenalectomy for pheochromocytoma    NH MASTECTOMY, MODIFIED RADICAL      Description: Modified Radical Mastectomy Left Breast;  Recorded: 04/07/2010;    REPAIR HAMMER TOE Right 6/29/2015    Procedure: REPAIR HAMMER TOE;  Surgeon: Jason Coughlin MD;  Location: [facility]    TONSILLECTOMY      TONSILLECTOMY & ADENOIDECTOMY      ZZC ARTHRODESIS,ANKLE,OPEN Right     Description: Ankle Arthrodesis;  Recorded: 04/07/2010;        History of seizures: no   History of head trauma/loss of consciousness: no     ALLERGY                                 Serotonin reuptake inhibitors (ssris), Buspirone, Cephalexin, Desvenlafaxine, Diclofenac sodium [diclofenac], Gabapentin, Levofloxacin, Penicillins, Riluzole, Sulfa antibiotics, and Topiramate    MEDICATIONS                               Current Outpatient Medications   Medication Sig Dispense Refill    albuterol (VENTOLIN HFA) 108 (90 Base) MCG/ACT inhaler Inhale 2 puffs into the lungs every 6 hours as needed for shortness of breath, wheezing or cough      allopurinol (ZYLOPRIM) 300 MG tablet Take 1 tablet (300 mg) by mouth daily 90 tablet 3    benzonatate (TESSALON) 200 MG capsule Take 1 capsule (200 mg) by mouth 3 times daily as needed for cough 30 capsule 1    Cyanocobalamin (VITAMIN B-12) 5000 MCG SUBL Place 2-3 sprays under the tongue daily Unknown dose. 2 or 3 sprays/day      ethacrynic acid (EDECRIN) 25 MG tablet Half pill every day 45 tablet 3    Fluticasone-Umeclidin-Vilanterol (TRELEGY ELLIPTA) 200-62.5-25 MCG/ACT oral inhaler Inhale 1 puff into the lungs daily 1 each 11    guaiFENesin (MUCINEX) 600 MG 12 hr tablet Take 1,200 mg by mouth 2 times daily as needed for congestion      KLOR-CON 20 MEQ CR tablet Take 1 tablet (20 mEq) by mouth daily 90 tablet 3    MAGNESIUM CITRATE PO Take 235 mg by mouth at bedtime      medical cannabis (Patient's own supply) See Admin Instructions (The purpose of this order is to document that the patient reports taking medical cannabis.  This is not a prescription, and is not used to certify that the patient has a qualifying medical condition.)  Flower      naloxone (NARCAN) 4 MG/0.1ML nasal spray Spray 1 spray (4 mg) into one nostril alternating nostrils as needed for opioid reversal every 2-3 minutes until assistance arrives 0.2 mL 0    omeprazole (PRILOSEC) 20 MG DR capsule Take 1 capsule (20 mg) by mouth daily 90 capsule 3    ondansetron (ZOFRAN ODT) 4 MG ODT tab DISSOLVE 1 TABLET UNDER THE TONGUE EVERY 6 HOURS AS NEEDED FOR NAUSEA*      oxyCODONE  (ROXICODONE) 5 MG tablet Take 1 tablet (5 mg) by mouth 4 times daily Dispense/start 11/30/23 112 tablet 0    rOPINIRole (REQUIP) 2 MG tablet One tab 3 pm, one bedtime, and one during night on waking 90 tablet 3    STATIN NOT PRESCRIBED (INTENTIONAL) Please choose reason not prescribed from choices below.      SYNTHROID 150 MCG tablet Take 1 tablet (150 mcg) by mouth daily 90 tablet 4    vitamin C (ASCORBIC ACID) 1000 MG TABS Take 1,000 mg by mouth daily      vitamin D3 (CHOLECALCIFEROL) 250 mcg (01556 units) capsule Take 1 capsule by mouth once a week         VITALS                                                                                                                            3, 3   Wt 97.1 kg (214 lb)   LMP  (LMP Unknown)   BMI 34.54 kg/m       MENTAL STATUS EXAM                                                                                    9, 14 cog gs     Alertness: alert  and oriented  Appearance: adequately groomed  Behavior/Demeanor: cooperative, pleasant, and interruptive, with fair  eye contact   Speech: normal  Language: intact  Psychomotor: normal or unremarkable  Mood: depressed and anxious  Affect: restricted, blunted, and guarded; was congruent to mood; was congruent to content  Thought Process/Associations: circumstantial and tangential  Thought Content:  Reports suicidal ideation with plan; without intent [details in Interim History];  Denies violent ideation, preoccupations, phobia , magical thinking, over-valued ideas, and paranoid ideation  Perception:  Reports none;  Denies auditory hallucinations and visual hallucinations  Insight: fair  Judgment: adequate for safety  Cognition: does appear grossly intact; formal cognitive testing was not done    PSYCHIATRIC DIAGNOSES                                                                                               Major depressive disorder  Generalized anxiety disorder    Bipolar 2 disorder, by history  Trauma and stress-related  "disorder, by history        ASSESSMENT                                                                                                          m2, h3     Please note, writer did not receive all pertinent medical records as of the time of this assessment. Randi did not sign PAULETTE's for additional records.     Today, Randi presents as a Fair historian with Fair insight. Randi s past and present depressive and hypomanic symptoms seem consistent with a diagnosis of bipolar disorder. Depressive symptoms seem to contribute to impaired functioning in the areas of ADLs, social relationships, physical health, occupational performance, and emotional wellbeing . Precipitating factors may include early onset of symptoms. Perpetuating factors may consist of chronic pain, social isolation, lack of clarity about diagnoses.     The M.I.N.I. scores positively for a diagnosis/diagnoses of generalized anxiety.     Substance use does not seem to be a current problem.     Further diagnostic information is needed to clarify or rule out diagnosis(es) of social anxiety vs agoraphobia and trauma and stress-related disorder vs. PTSD.     Winnie reports that she has always struggled with friendships and notes she \"had to work hard\" at them as far back as elementary school. She states \"I always felt off.\" She reports that she was the youngest of three kids and she is nine years younger than her older brother. Her family moved states several times when she was growing up and her father  with she was 14 years old. Winnie talks about \"the craziness\" in her family after her father .     Basic needs (food, housing, transportation, safety, income stability): met    Today, based on all available evidence, she does not appear to be at imminent risk for self-harm therefore does not meet criteria for a 72-hr hold/  involuntary hospitalization.     Additional steps to minimize risk discussed, include: people to reach out to, providers to reach " out to, crisis numbers and texts, and EmPATH.  24/7 crisis resources were provided verbally.     PLAN                                                                                                                        m2, h3   Patient will meet with [facility] program PharmD and [facility] program Psychiatrist to complete comprehensive multi-disciplinary assessment. Informed Randi that if appropriate for Interventional Psychiatry treatments, care will be provided with goal of stabilization with subsequent transfer back to the community (i.e. PCP or previous psychiatrist).    Medications: to be addressed during an MTM visit and new patient medication evaluation with a Psychiatrist    Therapy:  Yes - continue with current therapist. Could consider DBT for additional support and skill building and/or a support group.    Crisis Resources provided:  24/7 crisis resources including but not limited to the following:   - National Suicide Prevention Hotline: 0-266-752-TALK (1596)   - Crisis Text Line: Text START to 521-795   - Mental Health Emergency Number: 988   - [facility]    Other Referrals:  No    RTC: For MTM visit.    Deborah Barba, [facility]           Virtual Visit Details    Type of service:  Video Visit   Video Start Time: 10:04  Video End Time: 11:45    Originating Location (pt. Location): Home    Distant Location (provider location):  Off-site  Platform used for Video Visit: Tara

## 2023-12-14 ENCOUNTER — VIRTUAL VISIT (OUTPATIENT)
Dept: PSYCHOLOGY | Facility: CLINIC | Age: 68
End: 2023-12-14
Payer: MEDICARE

## 2023-12-14 DIAGNOSIS — F31.81 BIPOLAR 2 DISORDER (H): Primary | ICD-10-CM

## 2023-12-14 DIAGNOSIS — F43.9 TRAUMA AND STRESSOR-RELATED DISORDER: ICD-10-CM

## 2023-12-14 DIAGNOSIS — F41.1 GENERALIZED ANXIETY DISORDER: ICD-10-CM

## 2023-12-14 PROCEDURE — 90837 PSYTX W PT 60 MINUTES: CPT | Mod: VID | Performed by: SOCIAL WORKER

## 2023-12-14 NOTE — PROGRESS NOTES
[facility]                                     Progress Note    Patient Name: Randi Cleary  Date: 12/14/23         Service Type: Individual     Session Start Time: 3:01 PM Session End Time: 3:54 PM     Session Length: 53 minutes    Session #: 220    Attendees: Client attended alone    Service Modality:  Video Visit:      Provider verified identity through the following two step process.  Patient provided:  Patient is known previously to provider    Telemedicine Visit: The patient's condition can be safely assessed and treated via synchronous audio and visual telemedicine encounter.      Reason for Telemedicine Visit: Patient convenience (e.g. access to timely appointments / distance to available provider)    Originating Site (Patient Location): Patient's home    Distant Site (Provider Location): Provider Remote Setting- Home Office    Consent:  The patient/guardian has verbally consented to: the potential risks and benefits of telemedicine (video visit) versus in person care; bill my insurance or make self-payment for services provided; and responsibility for payment of non-covered services.     Patient would like the video invitation sent by:  My Chart    Mode of Communication:  [facility]    Distant Location (Provider):  Off-site    As the provider I attest to compliance with applicable laws and regulations related to telemedicine.      DATA  Extended Session (53+ minutes): PROLONGED SERVICE IN THE OUTPATIENT SETTING REQUIRING DIRECT (FACE-TO-FACE) PATIENT CONTACT BEYOND THE USUAL SERVICE:    - Treatment protocol required additional time to complete, due to the nature of diagnosis being treated.  See Interventions section for details  Interactive Complexity: No  Crisis: No        Progress Since Last Session (Related to Symptoms / Goals / Homework):   Symptoms:  Patient reports feeling like she's been in a lower mood over the weekend       Homework:  Progress made     Call Judie about getting records -done, they were not able to help  Check in on family at next session -done  Get back to the pool -not yet done  Get the bedrooms set  Journal at least once prior to next session     Episode of Care Goals: Satisfactory progress - ACTION (Actively working towards change); Intervened by reinforcing change plan / affirming steps taken     Current / Ongoing Stressors and Concerns:   Patient is currently socially isolated. She has a conflictual relationship with her .  She is getting minimal physical activity.  She has had several surgeries.      Treatment Objective(s) Addressed in This Session:   Patient will increase frequency of engaging her in ADLs.  Patient will track and record at least 5 pleasant exchanges with . Patient will be able to identify at least 5 positive traits about her .  Patient will reduce level of depressive and anxious features as evidenced by reduction in score on her CHAVO-7 and PHQ-9 (scores of 15 and 16 at first measurment, respectively).     Intervention:   Supportive Therapy:   Processed the multiple appointments that patient has and her confusion around what to prioritize. Reviewed psychiatry vs. Interventional psychiatry.  Processed her history with her family and feeling the criticism she felt by her family, exploring the messages she received from them. Discussed her hesitation to go on Lamictal and where she wants to go with this. Also discussed her concerns around her THC usage and opoid usage, discussing how and why they were prescribed and her worries around continuing them.      The following assessments were completed by patient for this visit:  PHQ9:       9/18/2023     9:51 AM 9/20/2023    10:10 AM 9/27/2023     4:45 AM 10/9/2023     8:52 AM 10/16/2023     8:25 AM 10/31/2023     4:16 AM 12/4/2023    12:58 AM   PHQ-9 SCORE   PHQ-9 Total Score MyChart 18 (Moderately severe depression) 18 (Moderately severe depression) 17  (Moderately severe depression) 18 (Moderately severe depression) 12 (Moderate depression) 18 (Moderately severe depression) 15 (Moderately severe depression)   PHQ-9 Total Score 18 18 17    17 18 12 18 15    15     GAD7:       8/7/2023     1:03 PM 8/21/2023    11:07 AM 9/5/2023     1:30 PM 9/20/2023     9:59 AM 10/9/2023     8:55 AM 10/31/2023     4:25 AM 12/1/2023    11:37 AM   CHAVO-7 SCORE   Total Score 18 (severe anxiety) 13 (moderate anxiety) 15 (severe anxiety) 16 (severe anxiety) 15 (severe anxiety) 15 (severe anxiety) 14 (moderate anxiety)   Total Score 18    18 13 15 16    16    16 15 15 14     PROMIS 10-Global Health (only subscores and total score):       7/6/2023     9:12 AM 7/21/2023    10:36 AM 7/28/2023     3:24 PM 8/7/2023     1:07 PM 9/20/2023    10:13 AM 10/31/2023     4:36 AM 12/1/2023    11:52 AM   PROMIS-10 Scores Only   Global Mental Health Score 5    5 5    5  6    6     5    5 6    Global Physical Health Score 8    8 7    7  8    8     9    9 8    PROMIS TOTAL - SUBSCORES 13    13 12    12  14    14     14    14 14        Information is confidential and restricted. Go to Review Flowsheets to unlock data.    Multiple values from one day are sorted in reverse-chronological order        ASSESSMENT: Current Emotional / Mental Status (status of significant symptoms):   Risk status (Self / Other harm or suicidal ideation)   Patient denies current fears or concerns for personal safety.   Patient denies current or recent suicidal ideation or behaviors.   Patient denies current or recent homicidal ideation or behaviors.   Patient denies current or recent self injurious behavior or ideation.   Patient denies other safety concerns.   Patient reports there has been no change in risk factors since their last session.     Patient reports there has been no change in protective factors since their last session.     A safety and risk management plan has been developed including: Patient consented to  co-developed safety plan on 11/24/2020, updated 2/20/23.  Safety and risk management plan was reviewed.   Patient agreed to use safety plan should any safety concerns arise.  A copy was made available to the patient.     Appearance:   Appropriate    Eye Contact:   Good    Psychomotor Behavior: Normal    Attitude:   Cooperative    Orientation:   All   Speech    Rate / Production: Normal/ Responsive    Volume:  Normal    Mood:    Anxious    Affect:    Appropriate  Tearful   Thought Content:  Clear    Thought Form:  Coherent    Insight:    Good      Medication Review:   No changes to current psychiatric medication(s)     Medication Compliance:   Yes     Changes in Health Issues:   None reported     Chemical Use Review:   Substance Use: Chemical use reviewed, no active concerns identified Nothing used since August 2021.     Tobacco Use: No current tobacco use.      Diagnosis:  1. Bipolar 2 disorder (H)    2. Generalized anxiety disorder    3. Trauma and stressor-related disorder          Collateral Reports Completed:   Not Applicable    PLAN: (Patient Tasks / Therapist Tasks / Other)  Continue looking at records  Check in on family at next session        There has been demonstrated improvement in functioning while patient has been engaged in psychotherapy/psychological service- if withdrawn the patient would deteriorate and/or relapse.     MICAELA SLADE, [facility]   December 14, 2023                                                        ______________________________________________________________________    Individual Treatment Plan    Patient's Name: Randi Cleary  YOB: 1955    Date of Creation: 6/30/20  Date Treatment Plan Last Reviewed/Revised: 12/14/23    DSM5 Diagnoses: 296.89 Bipolar II Disorder Depressed, 300.02 (F41.1) Generalized Anxiety Disorder, or Adjustment Disorders  309.89 (F43.8) Other Specified Trauma and Stressor Related Disorder  Psychosocial / Contextual Factors: Patient's  "entire family of origin has , she now has a sister-in-law and  as support.  Relationship with  is conflictual. She is recovering from surgeries  PROMIS (reviewed every 90 days): PROMIS-10 Scores  PROMIS 10-Global Health (only subscores and total score):   PROMIS-10 Scores Only 2021 3/15/2022 3/15/2022 3/15/2022 3/24/2022 2022 2022   Global Mental Health Score 6 6 6 6 8 12 12   Global Physical Health Score 9 9 9 9 8 11 10   PROMIS TOTAL - SUBSCORES 15 15 15 15 16 23 22       Referral / Collaboration:  Referral to another professional/service is not indicated at this time..    Anticipated number of session for this episode of care: 50  Anticipation frequency of session: Biweekly  Anticipated Duration of each session: 53 or more minutes due to intensity of trauma symptoms  Treatment plan will be reviewed in 90 days or when goals have been changed.   There has been demonstrated improvement in functioning while patient has been engaged in psychotherapy/psychological service- if withdrawn the patient would deteriorate and/or relapse.       MeasurableTreatment Goal(s) related to diagnosis / functional impairment(s)  Goal 1: Patient will \"jumpstart, getting going with the things I need to be doing around the house as far as picking up, doing things, trying to do something every day.  Also to lessen the animosity between me and my .\"    I will know I've met my goal when my shoulders are fixed and I can see.      Objective #A (Patient Action)    Patient will  increase frequency of engaging her in ADLs .  Status: Continued - Date(s): 12/10/21, 3/9/22, 22, 22, 22, 3/2/23, 23, 23, 23    Intervention(s)  Therapist will  engage patient in CBT, specifically behavioral activation .    Objective #B  Patient will   track and record at least 5 pleasant exchanges with . Patient will be able to identify at least 5 positive traits about her  and how he " "relates to her .  Status: Continued - Date(s): 12/10/21, 3/9/22, 6/9/22, 9/2/22, 12/1/22, 3/2/23, 6/6/23, 9/13/23, 12/14/23    Intervention(s)  Therapist will  teach assertiveness skills and assign homework related to relationship interactions .    Objective #C  Patient will  reduce level of depressive and anxious features as evidenced by reduction in score on her CHAVO-7 and PHQ-9 (scores of 15 and 16 at first measurment, respectively) .  Status: Continued - Date(s): 12/10/21, 3/9/22, 6/9/22, 9/2/22, 12/1/22, 3/2/23, 6/6/23, 9/13/23, 12/14/23    Intervention(s)  Therapist will  engage patient in person-centered therapy and CBT .    Patient has reviewed and agreed to the above plan.      MICAELA SLADE, [facility]  December 14, 2023                                                   Randi Cleary          SAFETY PLAN:  Step 1: Warning signs / cues (Thoughts, images, mood, situation, behavior) that a crisis may be developing:  Thoughts: \"I don't want to continue\" \"I am unwanted\"  Images: none  Thinking Processes: ruminating  Mood: anger  Behaviors: isolating/withdrawing , can't stop crying, not taking care of myself and not taking care of my responsibilities  Situations: small triggers, such as not being able to find something, or dropping something   Step 2: Coping strategies - Things I can do to take my mind off of my problems without contacting another person (relaxation technique, physical activity):  Distress Tolerance Strategies:  arts and crafts: drawing, play with my pet , listen to positive and upbeat music: any, change body temperature (ice pack/cold water)  and paced breathing/progressive muscle relaxation  Physical Activities: go for a walk, deep breathing and stretching   Focus on helpful thoughts:  \"You've been through this before, you can get through it again.\"  Step 3: People and social settings that provide distraction:                 Name: Carmen                            Name: Darien"         Name: Aleida       pool, shopping, Carmen's house, [employer]       Step 4: Remind myself of people and things that are important to me and worth living for:  Clifford Little Donna, post-COVID world, options of what could be in your future        Step 5: When I am in crisis, I can ask these people to help me use my safety plan:                 Name: Sidney  Step 6: Making the environment safe:   go to sleep/daydream  Step 7: Professionals or agencies I can contact during a crisis:  [facility] Daytime Number: [phone]  Suicide Prevention Lifeline: 1-875-107-TALK (8255)  Crisis Text Line Service (available 24 hours a day, 7 days a week): Text [place] to 791074  Local Crisis Services: [facility]: 856.421.1056  Adults can always access to the emPATH unit at [facility] (no phone number, utilize it like an urgent care or ER where you just show up)     Call 911 or go to my nearest emergency department.       I helped develop this safety plan and agree to use it when needed.  I have been given a copy of this plan.       Client signature _________________________________________________________________  Today s date:  11/24/2020  Adapted from Safety Plan Template 2008 Randi Poole and Robby Barba is reprinted with the express permission of the authors.  No portion of the Safety Plan Template may be reproduced without the express, written permission.  You can contact the authors at [email or web address] or [email or web address].

## 2023-12-15 ENCOUNTER — VIRTUAL VISIT (OUTPATIENT)
Dept: PSYCHOLOGY | Facility: CLINIC | Age: 68
End: 2023-12-15
Payer: MEDICARE

## 2023-12-15 DIAGNOSIS — F43.9 TRAUMA AND STRESSOR-RELATED DISORDER: ICD-10-CM

## 2023-12-15 DIAGNOSIS — F41.1 GENERALIZED ANXIETY DISORDER: ICD-10-CM

## 2023-12-15 DIAGNOSIS — F31.81 BIPOLAR 2 DISORDER (H): Primary | ICD-10-CM

## 2023-12-15 PROCEDURE — 90837 PSYTX W PT 60 MINUTES: CPT | Mod: VID | Performed by: SOCIAL WORKER

## 2023-12-15 NOTE — PROGRESS NOTES
[facility]                                     Progress Note    Patient Name: Randi Cleary  Date: 12/15/23         Service Type: Individual     Session Start Time: 11:03 AM Session End Time: 11:56 AM     Session Length: 53 minutes    Session #: 221    Attendees: Client attended alone    Service Modality:  Video Visit:      Provider verified identity through the following two step process.  Patient provided:  Patient is known previously to provider    Telemedicine Visit: The patient's condition can be safely assessed and treated via synchronous audio and visual telemedicine encounter.      Reason for Telemedicine Visit: Patient convenience (e.g. access to timely appointments / distance to available provider)    Originating Site (Patient Location): Patient's home    Distant Site (Provider Location): Provider Remote Setting- Home Office    Consent:  The patient/guardian has verbally consented to: the potential risks and benefits of telemedicine (video visit) versus in person care; bill my insurance or make self-payment for services provided; and responsibility for payment of non-covered services.     Patient would like the video invitation sent by:  My Chart    Mode of Communication:  [facility] Conference [facility]    Distant Location (Provider):  Off-site    As the provider I attest to compliance with applicable laws and regulations related to telemedicine.      DATA  Extended Session (53+ minutes): PROLONGED SERVICE IN THE OUTPATIENT SETTING REQUIRING DIRECT (FACE-TO-FACE) PATIENT CONTACT BEYOND THE USUAL SERVICE:    - Treatment protocol required additional time to complete, due to the nature of diagnosis being treated.  See Interventions section for details  Interactive Complexity: No  Crisis: No        Progress Since Last Session (Related to Symptoms / Goals / Homework):   Symptoms:  Patient reports no significant change since yesterday      Homework:  Progress made    Continue looking at  records -done  Check in on family at next session -done     Episode of Care Goals: Satisfactory progress - ACTION (Actively working towards change); Intervened by reinforcing change plan / affirming steps taken     Current / Ongoing Stressors and Concerns:   Patient is currently socially isolated. She has a conflictual relationship with her .  She is getting minimal physical activity.  She has had several surgeries.      Treatment Objective(s) Addressed in This Session:   Patient will increase frequency of engaging her in ADLs.  Patient will track and record at least 5 pleasant exchanges with . Patient will be able to identify at least 5 positive traits about her .  Patient will reduce level of depressive and anxious features as evidenced by reduction in score on her CHAVO-7 and PHQ-9 (scores of 15 and 16 at first measurment, respectively).     Intervention:   Supportive Therapy:   Discussed how patient has felt really alone lately. Discussed next steps for treatment for herself. Discussed past avoidance and how she's wanting to confront some things directly. Talked about relationship with family and feelings that came up around this. Discussed therapeutic relationship.       The following assessments were completed by patient for this visit:  PHQ9:       9/18/2023     9:51 AM 9/20/2023    10:10 AM 9/27/2023     4:45 AM 10/9/2023     8:52 AM 10/16/2023     8:25 AM 10/31/2023     4:16 AM 12/4/2023    12:58 AM   PHQ-9 SCORE   PHQ-9 Total Score [facility] 18 (Moderately severe depression) 18 (Moderately severe depression) 17 (Moderately severe depression) 18 (Moderately severe depression) 12 (Moderate depression) 18 (Moderately severe depression) 15 (Moderately severe depression)   PHQ-9 Total Score 18 18 17    17 18 12 18 15    15     GAD7:       8/7/2023     1:03 PM 8/21/2023    11:07 AM 9/5/2023     1:30 PM 9/20/2023     9:59 AM 10/9/2023     8:55 AM 10/31/2023     4:25 AM 12/1/2023    11:37 AM    CHAVO-7 SCORE   Total Score 18 (severe anxiety) 13 (moderate anxiety) 15 (severe anxiety) 16 (severe anxiety) 15 (severe anxiety) 15 (severe anxiety) 14 (moderate anxiety)   Total Score 18    18 13 15 16    16    16 15 15 14     PROMIS 10-Global Health (only subscores and total score):       7/6/2023     9:12 AM 7/21/2023    10:36 AM 7/28/2023     3:24 PM 8/7/2023     1:07 PM 9/20/2023    10:13 AM 10/31/2023     4:36 AM 12/1/2023    11:52 AM   PROMIS-10 Scores Only   Global Mental Health Score 5    5 5    5  6    6     5    5 6    Global Physical Health Score 8    8 7    7  8    8     9    9 8    PROMIS TOTAL - SUBSCORES 13    13 12    12  14    14     14    14 14        Information is confidential and restricted. Go to Review Flowsheets to unlock data.    Multiple values from one day are sorted in reverse-chronological order        ASSESSMENT: Current Emotional / Mental Status (status of significant symptoms):   Risk status (Self / Other harm or suicidal ideation)   Patient denies current fears or concerns for personal safety.   Patient denies current or recent suicidal ideation or behaviors.   Patient denies current or recent homicidal ideation or behaviors.   Patient denies current or recent self injurious behavior or ideation.   Patient denies other safety concerns.   Patient reports there has been no change in risk factors since their last session.     Patient reports there has been no change in protective factors since their last session.     A safety and risk management plan has been developed including: Patient consented to co-developed safety plan on 11/24/2020, updated 2/20/23.  Safety and risk management plan was reviewed.   Patient agreed to use safety plan should any safety concerns arise.  A copy was made available to the patient.     Appearance:   Appropriate    Eye Contact:   Good    Psychomotor Behavior: Normal    Attitude:   Cooperative    Orientation:   All   Speech    Rate / Production: Normal/  Responsive    Volume:  Normal    Mood:    Anxious    Affect:    Appropriate  Tearful   Thought Content:  Clear    Thought Form:  Coherent    Insight:    Good      Medication Review:   No changes to current psychiatric medication(s)     Medication Compliance:   Yes     Changes in Health Issues:   None reported     Chemical Use Review:   Substance Use: Chemical use reviewed, no active concerns identified Nothing used since 2021.     Tobacco Use: No current tobacco use.      Diagnosis:  1. Bipolar 2 disorder (H)    2. Generalized anxiety disorder    3. Trauma and stressor-related disorder      Collateral Reports Completed:   Not Applicable    PLAN: (Patient Tasks / Therapist Tasks / Other)  Continue looking at records  Continue to check on family at next session        There has been demonstrated improvement in functioning while patient has been engaged in psychotherapy/psychological service- if withdrawn the patient would deteriorate and/or relapse.     MICAELA SLADE, [facility]   December 15, 2023                                                        ______________________________________________________________________    Individual Treatment Plan    Patient's Name: Randi Cleary  YOB: 1955    Date of Creation: 20  Date Treatment Plan Last Reviewed/Revised: 23    DSM5 Diagnoses: 296.89 Bipolar II Disorder Depressed, 300.02 (F41.1) Generalized Anxiety Disorder, or Adjustment Disorders  309.89 (F43.8) Other Specified Trauma and Stressor Related Disorder  Psychosocial / Contextual Factors: Patient's entire family of origin has , she now has a sister-in-law and  as support.  Relationship with  is conflictual. She is recovering from surgeries  PROMIS (reviewed every 90 days): PROMIS-10 Scores  PROMIS 10-Global Health (only subscores and total score):   PROMIS-10 Scores Only 2021 3/15/2022 3/15/2022 3/15/2022 3/24/2022 2022 2022   Global Mental Health  "Score 6 6 6 6 8 12 12   Global Physical Health Score 9 9 9 9 8 11 10   PROMIS TOTAL - SUBSCORES 15 15 15 15 16 23 22       Referral / Collaboration:  Referral to another professional/service is not indicated at this time..    Anticipated number of session for this episode of care: 50  Anticipation frequency of session: Biweekly  Anticipated Duration of each session: 53 or more minutes due to intensity of trauma symptoms  Treatment plan will be reviewed in 90 days or when goals have been changed.   There has been demonstrated improvement in functioning while patient has been engaged in psychotherapy/psychological service- if withdrawn the patient would deteriorate and/or relapse.       MeasurableTreatment Goal(s) related to diagnosis / functional impairment(s)  Goal 1: Patient will \"jumpstart, getting going with the things I need to be doing around the house as far as picking up, doing things, trying to do something every day.  Also to lessen the animosity between me and my .\"    I will know I've met my goal when my shoulders are fixed and I can see.      Objective #A (Patient Action)    Patient will  increase frequency of engaging her in ADLs .  Status: Continued - Date(s): 12/10/21, 3/9/22, 6/9/22, 9/2/22, 12/1/22, 3/2/23, 6/6/23, 9/13/23, 12/14/23    Intervention(s)  Therapist will  engage patient in CBT, specifically behavioral activation .    Objective #B  Patient will   track and record at least 5 pleasant exchanges with . Patient will be able to identify at least 5 positive traits about her  and how he relates to her .  Status: Continued - Date(s): 12/10/21, 3/9/22, 6/9/22, 9/2/22, 12/1/22, 3/2/23, 6/6/23, 9/13/23, 12/14/23    Intervention(s)  Therapist will  teach assertiveness skills and assign homework related to relationship interactions .    Objective #C  Patient will  reduce level of depressive and anxious features as evidenced by reduction in score on her CHAVO-7 and PHQ-9 (scores of " "15 and 16 at first measurment, respectively) .  Status: Continued - Date(s): 12/10/21, 3/9/22, 6/9/22, 9/2/22, 12/1/22, 3/2/23, 6/6/23, 9/13/23, 12/14/23    Intervention(s)  Therapist will  engage patient in person-centered therapy and CBT .    Patient has reviewed and agreed to the above plan.      MICAELA SLADE, [facility]  December 14, 2023                                                   Randi Cleary          SAFETY PLAN:  Step 1: Warning signs / cues (Thoughts, images, mood, situation, behavior) that a crisis may be developing:  Thoughts: \"I don't want to continue\" \"I am unwanted\"  Images: none  Thinking Processes: ruminating  Mood: anger  Behaviors: isolating/withdrawing , can't stop crying, not taking care of myself and not taking care of my responsibilities  Situations: small triggers, such as not being able to find something, or dropping something   Step 2: Coping strategies - Things I can do to take my mind off of my problems without contacting another person (relaxation technique, physical activity):  Distress Tolerance Strategies:  arts and crafts: drawing, play with my pet , listen to positive and upbeat music: any, change body temperature (ice pack/cold water)  and paced breathing/progressive muscle relaxation  Physical Activities: go for a walk, deep breathing and stretching   Focus on helpful thoughts:  \"You've been through this before, you can get through it again.\"  Step 3: People and social settings that provide distraction:                 Name: Carmen                            Name: [place]                           Name: Aleida       pool, shopping, Carmen's house, [employer]       Step 4: Remind myself of people and things that are important to me and worth living for:  Sidney, Aleida Horton, post-COVID world, options of what could be in your future        Step 5: When I am in crisis, I can ask these people to help me use my safety plan:                 Name: Sidney  Step 6: Making the environment " safe:   go to sleep/daydream  Step 7: Professionals or agencies I can contact during a crisis:  [facility] Daytime Number: [phone]  Suicide Prevention Lifeline: 1-266-696-WKIR (7007)  Crisis Text Line Service (available 24 hours a day, 7 days a week): Text [place] to 238375  Local Crisis Services: [facility] Crisis: [phone]  Adults can always access to the emPATH unit at [facility] (no phone number, utilize it like an urgent care or ER where you just show up)     Call 911 or go to my nearest emergency department.       I helped develop this safety plan and agree to use it when needed.  I have been given a copy of this plan.       Client signature _________________________________________________________________  Today s date:  11/24/2020  Adapted from Safety Plan Template 2008 Randi Poole and Robby Barba is reprinted with the express permission of the authors.  No portion of the Safety Plan Template may be reproduced without the express, written permission.  You can contact the authors at [email or web address] or [email or web address].

## 2023-12-18 ENCOUNTER — VIRTUAL VISIT (OUTPATIENT)
Dept: PSYCHOLOGY | Facility: CLINIC | Age: 68
End: 2023-12-18
Payer: MEDICARE

## 2023-12-18 DIAGNOSIS — F43.9 TRAUMA AND STRESSOR-RELATED DISORDER: ICD-10-CM

## 2023-12-18 DIAGNOSIS — F31.81 BIPOLAR 2 DISORDER (H): Primary | ICD-10-CM

## 2023-12-18 DIAGNOSIS — F41.1 GENERALIZED ANXIETY DISORDER: ICD-10-CM

## 2023-12-18 PROCEDURE — 90837 PSYTX W PT 60 MINUTES: CPT | Mod: VID | Performed by: SOCIAL WORKER

## 2023-12-18 ASSESSMENT — ANXIETY QUESTIONNAIRES
2. NOT BEING ABLE TO STOP OR CONTROL WORRYING: SEVERAL DAYS
4. TROUBLE RELAXING: NEARLY EVERY DAY
GAD7 TOTAL SCORE: 16
6. BECOMING EASILY ANNOYED OR IRRITABLE: NEARLY EVERY DAY
IF YOU CHECKED OFF ANY PROBLEMS ON THIS QUESTIONNAIRE, HOW DIFFICULT HAVE THESE PROBLEMS MADE IT FOR YOU TO DO YOUR WORK, TAKE CARE OF THINGS AT HOME, OR GET ALONG WITH OTHER PEOPLE: VERY DIFFICULT
GAD7 TOTAL SCORE: 16
5. BEING SO RESTLESS THAT IT IS HARD TO SIT STILL: MORE THAN HALF THE DAYS
3. WORRYING TOO MUCH ABOUT DIFFERENT THINGS: MORE THAN HALF THE DAYS
7. FEELING AFRAID AS IF SOMETHING AWFUL MIGHT HAPPEN: MORE THAN HALF THE DAYS
1. FEELING NERVOUS, ANXIOUS, OR ON EDGE: NEARLY EVERY DAY

## 2023-12-18 NOTE — PROGRESS NOTES
[facility]                                     Progress Note    Patient Name: Randi Cleary  Date: 12/18/23         Service Type: Individual     Session Start Time: 4:35 PM Session End Time: 5:30 PM     Session Length: 53 minutes    Session #: 222    Attendees: Client attended alone    Service Modality:  Video Visit:      Provider verified identity through the following two step process.  Patient provided:  Patient is known previously to provider    Telemedicine Visit: The patient's condition can be safely assessed and treated via synchronous audio and visual telemedicine encounter.      Reason for Telemedicine Visit: Patient convenience (e.g. access to timely appointments / distance to available provider)    Originating Site (Patient Location): Patient's home    Distant Site (Provider Location): Provider Remote Setting- Home Office    Consent:  The patient/guardian has verbally consented to: the potential risks and benefits of telemedicine (video visit) versus in person care; bill my insurance or make self-payment for services provided; and responsibility for payment of non-covered services.     Patient would like the video invitation sent by:  My Chart    Mode of Communication:  Video Conference via Amwell    Distant Location (Provider):  Off-site    As the provider I attest to compliance with applicable laws and regulations related to telemedicine.      DATA  Extended Session (53+ minutes): PROLONGED SERVICE IN THE OUTPATIENT SETTING REQUIRING DIRECT (FACE-TO-FACE) PATIENT CONTACT BEYOND THE USUAL SERVICE:    - Treatment protocol required additional time to complete, due to the nature of diagnosis being treated.  See Interventions section for details  Interactive Complexity: No  Crisis: No        Progress Since Last Session (Related to Symptoms / Goals / Homework):   Symptoms:  Patient reports increase passive ideation and ongoing frustration with symptoms.      Homework:  Progress made     Continue looking at records  Continue to check on family at next session     Episode of Care Goals: Satisfactory progress - ACTION (Actively working towards change); Intervened by reinforcing change plan / affirming steps taken     Current / Ongoing Stressors and Concerns:   Patient is currently socially isolated. She has a conflictual relationship with her .  She is getting minimal physical activity.  She has had several surgeries.      Treatment Objective(s) Addressed in This Session:   Patient will increase frequency of engaging her in ADLs.  Patient will track and record at least 5 pleasant exchanges with . Patient will be able to identify at least 5 positive traits about her .  Patient will reduce level of depressive and anxious features as evidenced by reduction in score on her CHAVO-7 and PHQ-9 (scores of 15 and 16 at first measurment, respectively).     Intervention:   Supportive Therapy:   Discussed anxiety and setting worry times into their day. Talked about upcoming grief anniversaries on the 21st and having picked up an estra session to process this. 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 Talked about the past and how it impacts their present, processing what it was like to explore these memories.         The following assessments were completed by patient for this visit:  PHQ9:       9/20/2023    10:10 AM 9/27/2023     4:45 AM 10/9/2023     8:52 AM 10/16/2023     8:25 AM 10/31/2023     4:16 AM 12/4/2023    12:58 AM 12/18/2023     3:01 PM   PHQ-9 SCORE   PHQ-9 Total Score MyChart 18 (Moderately severe depression) 17 (Moderately severe depression) 18 (Moderately severe depression) 12 (Moderate depression) 18 (Moderately severe depression) 15 (Moderately severe depression) 19 (Moderately severe depression)   PHQ-9 Total Score 18 17    17 18 12 18 15    15 19     GAD7:       8/21/2023    11:07 AM 9/5/2023     1:30 PM 9/20/2023     9:59 AM 10/9/2023     8:55 AM  10/31/2023     4:25 AM 12/1/2023    11:37 AM 12/18/2023     3:09 PM   CHAVO-7 SCORE   Total Score 13 (moderate anxiety) 15 (severe anxiety) 16 (severe anxiety) 15 (severe anxiety) 15 (severe anxiety) 14 (moderate anxiety) 16 (severe anxiety)   Total Score 13 15 16    16    16 15 15 14 16     PROMIS 10-Global Health (only subscores and total score):       7/6/2023     9:12 AM 7/21/2023    10:36 AM 7/28/2023     3:24 PM 8/7/2023     1:07 PM 9/20/2023    10:13 AM 10/31/2023     4:36 AM 12/1/2023    11:52 AM   PROMIS-10 Scores Only   Global Mental Health Score 5    5 5    5  6    6     5    5 6    Global Physical Health Score 8    8 7    7  8    8     9    9 8    PROMIS TOTAL - SUBSCORES 13    13 12    12  14    14     14    14 14        Information is confidential and restricted. Go to Review Flowsheets to unlock data.    Multiple values from one day are sorted in reverse-chronological order        ASSESSMENT: Current Emotional / Mental Status (status of significant symptoms):   Risk status (Self / Other harm or suicidal ideation)   Patient denies current fears or concerns for personal safety.   Patient reports the following current or recent suicidal ideation or behaviors: reports increased passive ideation with no plans or intent.   Patient denies current or recent homicidal ideation or behaviors.   Patient denies current or recent self injurious behavior or ideation.   Patient denies other safety concerns.   Patient reports there has been no change in risk factors since their last session.     Patient reports there has been no change in protective factors since their last session.     A safety and risk management plan has been developed including: Patient consented to co-developed safety plan on 11/24/2020, updated 2/20/23.  Safety and risk management plan was reviewed.   Patient agreed to use safety plan should any safety concerns arise.  A copy was made available to the patient.     Appearance:   Appropriate    Eye  Contact:   Good    Psychomotor Behavior: Normal    Attitude:   Cooperative    Orientation:   All   Speech    Rate / Production: Normal/ Responsive    Volume:  Normal    Mood:    Anxious    Affect:    Appropriate  Tearful   Thought Content:  Clear    Thought Form:  Coherent    Insight:    Good      Medication Review:   No changes to current psychiatric medication(s)     Medication Compliance:   Yes     Changes in Health Issues:   None reported     Chemical Use Review:   Substance Use: Chemical use reviewed, no active concerns identified Nothing used since 2021.     Tobacco Use: No current tobacco use.      Diagnosis:  1. Bipolar 2 disorder (H)    2. Generalized anxiety disorder    3. Trauma and stressor-related disorder        Collateral Reports Completed:   Not Applicable    PLAN: (Patient Tasks / Therapist Tasks / Other)  Continue looking at records  Continue to process past        There has been demonstrated improvement in functioning while patient has been engaged in psychotherapy/psychological service- if withdrawn the patient would deteriorate and/or relapse.     MICAELA SLADE, [facility]   December 15, 2023                                                        ______________________________________________________________________    Individual Treatment Plan    Patient's Name: Randi Cleary  YOB: 1955    Date of Creation: 20  Date Treatment Plan Last Reviewed/Revised: 23    DSM5 Diagnoses: 296.89 Bipolar II Disorder Depressed, 300.02 (F41.1) Generalized Anxiety Disorder, or Adjustment Disorders  309.89 (F43.8) Other Specified Trauma and Stressor Related Disorder  Psychosocial / Contextual Factors: Patient's entire family of origin has , she now has a sister-in-law and  as support.  Relationship with  is conflictual. She is recovering from surgeries  PROMIS (reviewed every 90 days): PROMIS-10 Scores  PROMIS 10-Global Health (only subscores and total  "score):   PROMIS-10 Scores Only 12/14/2021 3/15/2022 3/15/2022 3/15/2022 3/24/2022 4/1/2022 6/9/2022   Global Mental Health Score 6 6 6 6 8 12 12   Global Physical Health Score 9 9 9 9 8 11 10   PROMIS TOTAL - SUBSCORES 15 15 15 15 16 23 22       Referral / Collaboration:  Referral to another professional/service is not indicated at this time..    Anticipated number of session for this episode of care: 50  Anticipation frequency of session: Biweekly  Anticipated Duration of each session: 53 or more minutes due to intensity of trauma symptoms  Treatment plan will be reviewed in 90 days or when goals have been changed.   There has been demonstrated improvement in functioning while patient has been engaged in psychotherapy/psychological service- if withdrawn the patient would deteriorate and/or relapse.       MeasurableTreatment Goal(s) related to diagnosis / functional impairment(s)  Goal 1: Patient will \"jumpstart, getting going with the things I need to be doing around the house as far as picking up, doing things, trying to do something every day.  Also to lessen the animosity between me and my .\"    I will know I've met my goal when my shoulders are fixed and I can see.      Objective #A (Patient Action)    Patient will  increase frequency of engaging her in ADLs .  Status: Continued - Date(s): 12/10/21, 3/9/22, 6/9/22, 9/2/22, 12/1/22, 3/2/23, 6/6/23, 9/13/23, 12/14/23    Intervention(s)  Therapist will  engage patient in CBT, specifically behavioral activation .    Objective #B  Patient will   track and record at least 5 pleasant exchanges with . Patient will be able to identify at least 5 positive traits about her  and how he relates to her .  Status: Continued - Date(s): 12/10/21, 3/9/22, 6/9/22, 9/2/22, 12/1/22, 3/2/23, 6/6/23, 9/13/23, 12/14/23    Intervention(s)  Therapist will  teach assertiveness skills and assign homework related to relationship interactions .    Objective #C  Patient " "will  reduce level of depressive and anxious features as evidenced by reduction in score on her CHAVO-7 and PHQ-9 (scores of 15 and 16 at first measurment, respectively) .  Status: Continued - Date(s): 12/10/21, 3/9/22, 6/9/22, 9/2/22, 12/1/22, 3/2/23, 6/6/23, 9/13/23, 12/14/23    Intervention(s)  Therapist will  engage patient in person-centered therapy and CBT .    Patient has reviewed and agreed to the above plan.      MICAELA SLADE, [facility]  December 14, 2023                                                   Randi Cleary          SAFETY PLAN:  Step 1: Warning signs / cues (Thoughts, images, mood, situation, behavior) that a crisis may be developing:  Thoughts: \"I don't want to continue\" \"I am unwanted\"  Images: none  Thinking Processes: ruminating  Mood: anger  Behaviors: isolating/withdrawing , can't stop crying, not taking care of myself and not taking care of my responsibilities  Situations: small triggers, such as not being able to find something, or dropping something   Step 2: Coping strategies - Things I can do to take my mind off of my problems without contacting another person (relaxation technique, physical activity):  Distress Tolerance Strategies:  arts and crafts: drawing, play with my pet , listen to positive and upbeat music: any, change body temperature (ice pack/cold water)  and paced breathing/progressive muscle relaxation  Physical Activities: go for a walk, deep breathing and stretching   Focus on helpful thoughts:  \"You've been through this before, you can get through it again.\"  Step 3: People and social settings that provide distraction:                 Name: Carmen                            Name: Darien                           Name: Aleida       pool, shopping, Carmen's house, [employer]       Step 4: Remind myself of people and things that are important to me and worth living for:  Clifford Little Donna, post-COVID world, options of what could be in your future        Step 5: When I am " in crisis, I can ask these people to help me use my safety plan:                 Name: Sidney  Step 6: Making the environment safe:   go to sleep/daydream  Step 7: Professionals or agencies I can contact during a crisis:  [facility] Daytime Number: [phone]  Suicide Prevention Lifeline: 9-819-822-TALK (8255)  Crisis Text Line Service (available 24 hours a day, 7 days a week): Text [place] to 430736  Local Crisis Services: [facility] Crisis: [phone]  Adults can always access to the emPATH unit at [facility] (no phone number, utilize it like an urgent care or ER where you just show up)     Call 911 or go to my nearest emergency department.       I helped develop this safety plan and agree to use it when needed.  I have been given a copy of this plan.       Client signature _________________________________________________________________  Today s date:  11/24/2020  Adapted from Safety Plan Template 2008 Randi Poole and Robby Barba is reprinted with the express permission of the authors.  No portion of the Safety Plan Template may be reproduced without the express, written permission.  You can contact the authors at [email or web address] or [email or web address].

## 2023-12-19 ENCOUNTER — HOSPITAL ENCOUNTER (OUTPATIENT)
Facility: CLINIC | Age: 68
End: 2023-12-19
Attending: INTERNAL MEDICINE | Admitting: INTERNAL MEDICINE
Payer: MEDICARE

## 2023-12-19 ENCOUNTER — VIRTUAL VISIT (OUTPATIENT)
Dept: CARDIOLOGY | Facility: CLINIC | Age: 68
End: 2023-12-19
Attending: INTERNAL MEDICINE
Payer: MEDICARE

## 2023-12-19 VITALS — HEIGHT: 66 IN | WEIGHT: 213 LBS | BODY MASS INDEX: 34.23 KG/M2

## 2023-12-19 DIAGNOSIS — I27.20 PULMONARY HYPERTENSION (H): Primary | ICD-10-CM

## 2023-12-19 DIAGNOSIS — R06.02 SOB (SHORTNESS OF BREATH): ICD-10-CM

## 2023-12-19 DIAGNOSIS — I27.20 PULMONARY HYPERTENSION (H): ICD-10-CM

## 2023-12-19 PROCEDURE — 99443 PR PHYSICIAN TELEPHONE EVALUATION 21-30 MIN: CPT | Mod: 95 | Performed by: INTERNAL MEDICINE

## 2023-12-19 RX ORDER — LIDOCAINE 40 MG/G
CREAM TOPICAL
OUTPATIENT
Start: 2023-12-19

## 2023-12-19 ASSESSMENT — PATIENT HEALTH QUESTIONNAIRE - PHQ9: SUM OF ALL RESPONSES TO PHQ QUESTIONS 1-9: 16

## 2023-12-19 ASSESSMENT — PAIN SCALES - GENERAL: PAINLEVEL: EXTREME PAIN (8)

## 2023-12-19 NOTE — PATIENT INSTRUCTIONS
You were seen today in the Pulmonary Hypertension Clinic at the [facility].     Cardiology Provider you saw during your visit:    Dr. Edwards    Medication Changes:  None    Recommendations:   - Right heart catheterization in April 2024    Pre-procedure instructions - Right heart catheterization  Patient Education    Your arrival time is TBD.  Location is [facility] - [address] [place] [address] - [facility] Waiting Room  Please plan on being at the hospital all day.  At any time, emergencies and/or urgent cases may come up which could delay the start of your procedure.    Pre-procedure instructions - Right heart catheterization  No solid food for 8 hours prior  Nothing to drink 2 hours prior to arrival time  You can take your morning medications (except diabetic and blood thinners) with sips of water  We recommend you arrange for a ride to drop you off and pick you up, in the instance, you are unable to drive home, however you should be able to function as you normally would after the procedure    Diabetic Medication Instructions  Typical instructions for insulin diabetic medication holding are below. However, please reach out to your Primary Care Provider or Endocrinologist for specific instructions  DO NOT take any oral diabetic medication, short-acting diabetes medications/insulin, humalog or regular insulin the morning of your test  Take   dose of long-acting insulin (Lantus, Levemir) the day of your test  Remember to  bring your glucometer and insulin with you to take after your test if needed  DO NOT take injectable GLP-1 agonists semaglutide (Ozempic, Wegovy), dulaglutide (Trulicity), exenatide ER (Bydureon), tirzepatide (Mounjaro), or oral semaglutide (Rybelsus) for 7 days prior your procedure  Hold once daily injectable GLP-1 agonists exenatide (Byetta), liraglutide (Saxenda, Victoza), lixisenatide (Soliqua) the day before and day of your  procedure      Follow-up:   - Follow up with Dr. Edwards after testing    Please call us immediately if you have any syncope (fainting or passing out), chest pain, edema (swelling or weight gain), or decline in your functional status (general decline in how you are feeling).    If you have emergent concerns after hours or on the weekend, please call our on-call Cardiologist at [phone], option 4. For emergencies call [phone].     Thank you for allowing us to be a part of your care here at the [facility] [place]    If you have questions or concerns please contact us at:    Miki Richardson RN (P: [phone])    Nurse Coordinator       Pulmonary Hypertension     [facility] [place]         Hanna Solano, JON Johnson   (Prior Auths & Pt Assistance)   ()  Clinic   Clinic   Pulmonary Hypertension   Pulmonary Hypertension  [facility] [place]  (P)[phone]    (P) [phone]  (F) [phone]

## 2023-12-19 NOTE — NURSING NOTE
No other vitals to report today      Is the patient currently in the state of [place]? YES    Visit mode:VIDEO    If the visit is dropped, the patient can be reconnected by: VIDEO VISIT: Text to cell phone:   Telephone Information:   Mobile [phone]       Will anyone else be joining the visit? NO  (If patient encounters technical issues they should call [phone])    How would you like to obtain your AVS? MyChart    Are changes needed to the allergy or medication list? No    Patient declined individual allergy and medication review by support staff because patient denies any changes since echeck-in completion and states all information entered during echeck-in remains accurate.    Reason for visit: RECHECK    Depression Response    Patient completed the PHQ-9 assessment for depression and scored >9? Yes  Question 9 on the PHQ-9 was positive for suicidality? No  Does patient have current mental health provider? Yes    Is this a virtual visit? Yes   Does patient have suicidal ideation (positive question 9)? No - offer to place Mental Health Referral.  Patient declined referral/not needed    I personally notified the following: visit provider      Daisy Dorado MA VVF

## 2023-12-19 NOTE — PROGRESS NOTES
Virtual Visit Details    Telephone 20 minutes      Reina Morillo MD    [address] [place] [address]    [phone]     Problem List  1. Possible pulmonary hypertension  2. History of breast cancer              History of mastectomy              Breast reconstruction  3. Atherosclerosis with coronary calcifications  4. Hiatal hernia  5. Hepatic steatosis  6. Diverticulosis  7. KYAW with treatment  8. Psoriasis  9. Grave's disease with ablation  10. Hypertension  11. Asthma  12. Hemochromatosis,gene +  13. Bipolar disorder ?  14. Chronic pain with narcotic dependence  15. Pheochromocytoma (right surgically removed)  16. Elevated body mass index  17. Elevate hemoglobin with macrocytosis  18  Hiatal hernia  19. Chronic pain management  20. Cervical stenosis  21. COVID 19 infection  22. Iron overload  23. 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  I discussed how the procedure would be performed and alternative diagnostic and therapeutic management strategies.  All questions were answered.           Name: ERIK BOSS  MRN: [id number]  : 1955  Study Date: 10/05/2023 11:11 AM  Age: 68 yrs  Gender: Female  Patient Location: [facility]  Reason For Study: Pulmonary HTN (H), Shortness of breath  Ordering Physician: MINO CORTES  Referring Physician: MINO CORTES  Performed By: Brijesh Briceño     BSA: 2.1 m2  Height: 66 in  Weight: 231 lb  HR: 75  BP: 122/76 mmHg  ______________________________________________________________________________  Procedure  Echocardiogram with two-dimensional, color and spectral Doppler  performed.  ______________________________________________________________________________  Interpretation Summary  Global and regional left ventricular function is normal with an EF of 55-60%  with normal size and thickness.     Global right ventricular function and size are normal.     Trace to mild aortic insufficiency is present.     No pericardial effusion is present.     This study was compared with the study from 8/13/19 .  No significant changes noted.     ______________________________________________________________________________  Left Ventricle  Left ventricular wall thickness is normal. Left ventricular size is normal.  Global and regional left ventricular function is normal with an EF of 55-60%.  Left ventricular diastolic function is not assessable.     Right Ventricle  The right ventricle is normal size. Global right ventricular function is  normal.     Atria  Both atria appear normal.     Mitral Valve  Mild to moderate mitral annular calcification is present. Mild mitral  insufficiency is present.     Aortic Valve  The aortic valve is tricuspid. Trace to mild aortic insufficiency is present.     Tricuspid Valve  Trace tricuspid insufficiency is present. The peak velocity of the tricuspid  regurgitant jet is not obtainable. Pulmonary artery systolic pressure cannot  be assessed.     Pulmonic Valve  The valve leaflets are not well visualized. On Doppler interrogation, there is  no significant stenosis or regurgitation.     Vessels  The aorta root is normal. The thoracic aorta is normal. Sinuses of Valsalva  3.0 cm. Ascending aorta 3.3 cm. IVC diameter <2.1 cm collapsing >50% with  sniff suggests a normal RA pressure of 3 mmHg.     Pericardium  No pericardial effusion is present.     Compared to Previous Study  This study was compared with the study from 8/13/19 . No significant changes  noted.      assess for pulmonary HTN       Conclusion       Right sided filling pressures are moderately  elevated.  Moderately elevated pulmonary artery hypertension.  Left sided filling pressures are mildly elevated.  Normal cardiac output level.     1. Normal coronary arteries.         Plan     Follow bedrest per protocol   Continued medical management and lifestyle modifications for cardiovascular risk factor optimizations.   Follow up with Dr. Francisco J Edwards.   Discharge today per protocol     Coronary Findings    Diagnostic  Dominance: Right  Left Anterior Descending      First Diagonal Branch   The vessel is moderate in size.         Intervention     No interventions have been documented.     Hemodynamics    Phase: Baseline    RA   A-wave: 16 mmHg   V-wave: 16 mmHg   Mean: 15 mmHg    HR: 88 bpm  RV   Systolic: 44 mmHg   End Diastolic: 16 mmHg   HR: 88 bpm    PA   Systolic:41 mmHg   Diasotlic: 24 mmHg   Mean: 31 mmHg   HR: 88 bpm   PA Sat: 70.8%    PCW   A-wave: 18 mmHg   V-wave: 17 mmHg   Mean: 16 mmHg   HR: 91 bpm    Cardiac Output   CO Jeannie: 5.72 L/min   CI Jeannie: 2.56 L/min/m2   CO TD:  Not performed   CI TD:  Not performed      Vitals   BP: 168 mmHg / 74 mmHg   SpO2: 92 %   PA Stat: 70.8 %    Resistance Metric   PVR index: 5.86 QUIROZ/m2     Right sided filling pressures are moderately elevated.Left sided filling pressures are mildly elevated. Moderately elevated pulmonary artery hypertension.Normal cardiac output level.

## 2023-12-19 NOTE — LETTER
2023      RE: Randi Cleary  [address] [facility] [address]       Dear Colleague,    Thank you for the opportunity to participate in the care of your patient, Randi Cleary, at the [facility] [place] [facility]. Please see a copy of my visit note below.      Loida Rudolph MD    [address] [place] [address]    [phone] (Fax)          Reina Morillo MD    [address], [place] [id number] [phone]     Problem List  1. Possible pulmonary hypertension  2. History of breast cancer              History of mastectomy              Breast reconstruction  3. Atherosclerosis with coronary calcifications  4. Hiatal hernia  5. Hepatic steatosis  6. Diverticulosis  7. KYAW with treatment  8. Psoriasis  9. Grave's disease with ablation  10. Hypertension  11. Asthma  12. Hemochromatosis,gene +  13. Bipolar disorder ?  14. Chronic pain with narcotic dependence  15. Pheochromocytoma (right surgically removed)  16. Elevated body mass index  17. Elevate hemoglobin with macrocytosis  18  Hiatal hernia  19. Chronic pain management  20. Cervical stenosis  21. COVID 19 infection  22. Iron overload  23. 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  ______________________________________________________________________________  Interpretation Summary  Global and regional left ventricular function is normal with an EF of 55-60%  with normal size and thickness.     Global right ventricular function and size are normal.     Trace to mild aortic insufficiency is present.     No pericardial effusion is present.     This study was compared with the study from 8/13/19 .  No significant changes noted.     ______________________________________________________________________________  Left Ventricle  Left ventricular wall thickness is normal. Left ventricular size is normal.  Global and regional left ventricular function is normal with an EF of 55-60%.  Left ventricular diastolic function is not assessable.     Right Ventricle  The right ventricle is normal size. Global right ventricular function is  normal.     Atria  Both atria appear normal.     Mitral Valve  Mild to moderate mitral annular calcification is present. Mild mitral  insufficiency is present.     Aortic Valve  The aortic valve is tricuspid. Trace to mild aortic insufficiency is present.     Tricuspid Valve  Trace tricuspid insufficiency is present. The peak velocity of the tricuspid  regurgitant jet is not obtainable. Pulmonary artery systolic pressure cannot  be assessed.     Pulmonic Valve  The valve leaflets are not well visualized. On Doppler interrogation, there is  no significant stenosis or regurgitation.     Vessels  The aorta root is normal. The thoracic aorta is normal. Sinuses of Valsalva  3.0 cm. Ascending aorta 3.3 cm. IVC diameter <2.1 cm collapsing >50% with  sniff suggests a normal RA pressure of 3 mmHg.     Pericardium  No pericardial effusion is present.     Compared to Previous Study  This study was compared with the study from 8/13/19 . No significant changes  noted.      assess for pulmonary HTN       Conclusion       Right sided filling pressures are moderately  elevated.  Moderately elevated pulmonary artery hypertension.  Left sided filling pressures are mildly elevated.  Normal cardiac output level.     1. Normal coronary arteries.         Plan     Follow bedrest per protocol   Continued medical management and lifestyle modifications for cardiovascular risk factor optimizations.   Follow up with Dr. Francisco J Edwards.   Discharge today per protocol     Coronary Findings    Diagnostic  Dominance: Right  Left Anterior Descending      First Diagonal Branch   The vessel is moderate in size.         Intervention     No interventions have been documented.     Hemodynamics    Phase: Baseline    RA   A-wave: 16 mmHg   V-wave: 16 mmHg   Mean: 15 mmHg    HR: 88 bpm  RV   Systolic: 44 mmHg   End Diastolic: 16 mmHg   HR: 88 bpm    PA   Systolic:41 mmHg   Diasotlic: 24 mmHg   Mean: 31 mmHg   HR: 88 bpm   PA Sat: 70.8%    PCW   A-wave: 18 mmHg   V-wave: 17 mmHg   Mean: 16 mmHg   HR: 91 bpm    Cardiac Output   CO Jeannie: 5.72 L/min   CI Jeannie: 2.56 L/min/m2   CO TD:  Not performed   CI TD:  Not performed      Vitals   BP: 168 mmHg / 74 mmHg   SpO2: 92 %   PA Stat: 70.8 %    Resistance Metric   PVR index: 5.86 QUIROZ/m2     Right sided filling pressures are moderately elevated.Left sided filling pressures are mildly elevated. Moderately elevated pulmonary artery hypertension.Normal cardiac output level.           Please do not hesitate to contact me if you have any questions/concerns.     Sincerely,     Francisco J Edwards MD

## 2023-12-19 NOTE — NURSING NOTE
Follow Up Plan:  - [facility] and in person follow up with Dr. Edwards after testing in April 2024    Staff message sent to Siobhan to schedule testing. Bindu Walden RN on 12/19/2023 at 5:37 PM

## 2023-12-20 ENCOUNTER — VIRTUAL VISIT (OUTPATIENT)
Dept: FAMILY MEDICINE | Facility: CLINIC | Age: 68
End: 2023-12-20
Payer: MEDICARE

## 2023-12-20 DIAGNOSIS — Z71.3 DIETARY COUNSELING AND SURVEILLANCE: Primary | ICD-10-CM

## 2023-12-20 NOTE — PROGRESS NOTES
"PATIENT HISTORY:   Ms. Randi Cleary returns for her nutrition visit to the lifestyle medicine weight management program. Previous measurements have been as follows:    Wt Readings from Last 5 Encounters:   12/19/23 96.6 kg (213 lb)   12/11/23 97.1 kg (214 lb)   10/19/23 101.1 kg (222 lb 12.8 oz)   10/18/23 100.2 kg (221 lb)   09/28/23 104.8 kg (231 lb)       Reported wts:  9/28 - 231 lb  10/19 - 222 lb  11/9 - 219.7 lb  11/10 - 218.4  11/11 - 218.6  11/12 - 220.3  11/13 - 220.6  11/14 - 220.7  11/15 - 220.9  11/16 - 219.7  11/17 - 220  11/18 - 219.4  11/19 - 218.3  11/20 - 217 12/6 - 214 12/12 - 213 12/14 - 211 12/20 - 214     Initial goal is to achieve a 5% weight loss of 11-12 lbs (i.e. A body weight of 216-217 lbs) within the next 3-4 months.        Previous goals:  Snack ideas discussed based on her preferences - fruit, yogurt, cottage cheese, or nuts   Focus on smaller victories instead of only the \"goal weight\"  Trust the process even during weight plateau     Winnie notices she is eating due to stress especially when keeping up with current events. Trying to limit watching the news. Also, continues to be challenged by gout. Winnie is also concerned she has muscle wasting. She has researched how much protein to eat and realizes she could do some strength training as well to help build/preserve muscle.     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 This has been a challenge especially related to finding motivation for movement.She would like to start swimming in the pool.       DIETARY HISTORY:   Breakfast: eggs, or cottage cheese, or nothing because taking medications  [place] as an option when not feeling like a meal    PHYSICAL ACTIVITY HISTORY:  Now, activity is very limited due to RIGHT ankle injuries, low back pain. Her activity is going up and down stairs in her house to do laundry or getting out of the house to go to appointments. Also, feeds the cat. Has trouble lifting because of " "her shoulders. Has a stationary bicycle at home, not really able to use at this point (sounds like a bike on a )  Long term goal is to walk in the pool  Interested in more activity, yoga - Silver Sneakers     Social: .  is  and may work very long 12 hour days.       OBJECTIVE:   Estimated body mass index is 34.38 kg/m  as calculated from the following:    Height as of 12/19/23: 1.676 m (5' 6\").    Weight as of 12/19/23: 96.6 kg (213 lb).      IMPRESSION:   Winnie is a 68 year old with obesity who is motivated to lose weight in order to improve her health  Weight trending down, she has lost 17 lbs since late Sept 2023      DIETARY ASSESSMENT/PLAN:   75-90 grams of protein total throughout the day, aiming for around 20-30 grams of protein per meal and 10-20 grams for a snack  Okay to have salmon, canned or fresh; eggs; yogurt; cottage cheese; lentils or other dry beans; turkey or chicken (or ground turkey or chicken)  Keep an eating record including foods eaten and portion sizes to review patterns (smaller or larger meals, average protein intake, etc)    Follow-up:   Wednesday January 10 at 2:00 in person    Patient referred by Laith Constantino MD   Total time spent with patient 45 minutes    Ekaterina Horton RD    Family Medicine Video Visit Note  Winnie is being evaluated via a billable video visit.             Video Visit Consent     The patient has been notified of following:     \"This video visit will be conducted via a call between you and your physician/provider. We have found that certain health care needs can be provided without the need for an in-person physical exam.  This service lets us provide the care you need with a video conversation.  If a prescription is necessary we can send it directly to your pharmacy.  If lab work is needed we can place an order for that and you can then stop by our lab to have the test done at a later time.    Video visits are billed at " "different rates depending on your insurance coverage.  Please reach out to your insurance provider with any questions.    If during the course of the call the physician/provider feels a video visit is not appropriate, you will not be charged for this service.\"    Patient has given verbal consent for Video visit? Yes    How would you like to obtain your AVS? Kellihart    Patient would like the video invitation sent by: Other e-mail: my chart    Will anyone else be joining your video visit? No       Video-Visit Details    Type of service:  Video Visit    Video Start Time: 2:00 PM  THIS is the time provider and patient connect.    Video End Time (time video stopped): 2:45 PM    Originating Location (pt. Location): Home    Distant Location (provider location):  [facility]     Mode of Communication:  Video Conference via Isaac Horton RD                      "

## 2023-12-21 ENCOUNTER — VIRTUAL VISIT (OUTPATIENT)
Dept: PSYCHOLOGY | Facility: CLINIC | Age: 68
End: 2023-12-21
Payer: MEDICARE

## 2023-12-21 DIAGNOSIS — M19.071 OSTEOARTHRITIS OF RIGHT ANKLE AND FOOT: ICD-10-CM

## 2023-12-21 DIAGNOSIS — F41.1 GENERALIZED ANXIETY DISORDER: ICD-10-CM

## 2023-12-21 DIAGNOSIS — F43.9 TRAUMA AND STRESSOR-RELATED DISORDER: ICD-10-CM

## 2023-12-21 DIAGNOSIS — F31.81 BIPOLAR 2 DISORDER (H): Primary | ICD-10-CM

## 2023-12-21 PROCEDURE — 90837 PSYTX W PT 60 MINUTES: CPT | Mod: VID | Performed by: SOCIAL WORKER

## 2023-12-21 RX ORDER — OXYCODONE HYDROCHLORIDE 5 MG/1
5 TABLET ORAL 4 TIMES DAILY
Qty: 112 TABLET | Refills: 0 | Status: SHIPPED | OUTPATIENT
Start: 2023-12-21 | End: 2024-01-17

## 2023-12-21 NOTE — PROGRESS NOTES
[facility]                                     Progress Note    Patient Name: Randi Cleary  Date: 12/21/23         Service Type: Individual     Session Start Time: 9:35 AM Session End Time: 10:28 AM     Session Length: 53 minutes    Session #: 223    Attendees: Client attended alone    Service Modality:  Video Visit:      Provider verified identity through the following two step process.  Patient provided:  Patient is known previously to provider    Telemedicine Visit: The patient's condition can be safely assessed and treated via synchronous audio and visual telemedicine encounter.      Reason for Telemedicine Visit: Patient convenience (e.g. access to timely appointments / distance to available provider)    Originating Site (Patient Location): Patient's home    Distant Site (Provider Location): Provider Remote Setting- Home Office    Consent:  The patient/guardian has verbally consented to: the potential risks and benefits of telemedicine (video visit) versus in person care; bill my insurance or make self-payment for services provided; and responsibility for payment of non-covered services.     Patient would like the video invitation sent by:  My Chart    Mode of Communication:  Video Conference via [facility]    Distant Location (Provider):  Off-site    As the provider I attest to compliance with applicable laws and regulations related to telemedicine.      DATA  Extended Session (53+ minutes): PROLONGED SERVICE IN THE OUTPATIENT SETTING REQUIRING DIRECT (FACE-TO-FACE) PATIENT CONTACT BEYOND THE USUAL SERVICE:    - Treatment protocol required additional time to complete, due to the nature of diagnosis being treated.  See Interventions section for details  Interactive Complexity: No  Crisis: No        Progress Since Last Session (Related to Symptoms / Goals / Homework):   Symptoms:  Patient reports no significant change      Homework:  Progress made    Continue looking at records  Continue  to process past     Episode of Care Goals: Satisfactory progress - ACTION (Actively working towards change); Intervened by reinforcing change plan / affirming steps taken     Current / Ongoing Stressors and Concerns:   Patient is currently socially isolated. She has a conflictual relationship with her .  She is getting minimal physical activity.  She has had several surgeries.      Treatment Objective(s) Addressed in This Session:   Patient will increase frequency of engaging her in ADLs.  Patient will track and record at least 5 pleasant exchanges with . Patient will be able to identify at least 5 positive traits about her .  Patient will reduce level of depressive and anxious features as evidenced by reduction in score on her CHAVO-7 and PHQ-9 (scores of 15 and 16 at first measurment, respectively).     Intervention:   Supportive Therapy:   Processed past surgeries and medical interventions, putting them on a timeline with other life events, including school and loss of her mother. Discussed the weight loss she's experienced. Looked at prioritizing her medical needs. Processed relationship with family, exploring past circumstances with childhood.      The following assessments were completed by patient for this visit:  PHQ9:       9/27/2023     4:45 AM 10/9/2023     8:52 AM 10/16/2023     8:25 AM 10/31/2023     4:16 AM 12/4/2023    12:58 AM 12/18/2023     3:01 PM 12/19/2023     3:50 PM   PHQ-9 SCORE   PHQ-9 Total Score MyChart 17 (Moderately severe depression) 18 (Moderately severe depression) 12 (Moderate depression) 18 (Moderately severe depression) 15 (Moderately severe depression) 19 (Moderately severe depression)    PHQ-9 Total Score 17    17 18 12 18 15    15 19 16     GAD7:       8/21/2023    11:07 AM 9/5/2023     1:30 PM 9/20/2023     9:59 AM 10/9/2023     8:55 AM 10/31/2023     4:25 AM 12/1/2023    11:37 AM 12/18/2023     3:09 PM   CHAVO-7 SCORE   Total Score 13 (moderate anxiety) 15  (severe anxiety) 16 (severe anxiety) 15 (severe anxiety) 15 (severe anxiety) 14 (moderate anxiety) 16 (severe anxiety)   Total Score 13 15 16    16    16 15 15 14 16     PROMIS 10-Global Health (only subscores and total score):       7/6/2023     9:12 AM 7/21/2023    10:36 AM 7/28/2023     3:24 PM 8/7/2023     1:07 PM 9/20/2023    10:13 AM 10/31/2023     4:36 AM 12/1/2023    11:52 AM   PROMIS-10 Scores Only   Global Mental Health Score 5    5 5    5  6    6     5    5 6    Global Physical Health Score 8    8 7    7  8    8     9    9 8    PROMIS TOTAL - SUBSCORES 13    13 12    12  14    14     14    14 14        Information is confidential and restricted. Go to Review Flowsheets to unlock data.    Multiple values from one day are sorted in reverse-chronological order        ASSESSMENT: Current Emotional / Mental Status (status of significant symptoms):   Risk status (Self / Other harm or suicidal ideation)   Patient denies current fears or concerns for personal safety.   Patient reports the following current or recent suicidal ideation or behaviors: reports increased passive ideation with no plans or intent.   Patient denies current or recent homicidal ideation or behaviors.   Patient denies current or recent self injurious behavior or ideation.   Patient denies other safety concerns.   Patient reports there has been no change in risk factors since their last session.     Patient reports there has been no change in protective factors since their last session.     A safety and risk management plan has been developed including: Patient consented to co-developed safety plan on 11/24/2020, updated 2/20/23.  Safety and risk management plan was reviewed.   Patient agreed to use safety plan should any safety concerns arise.  A copy was made available to the patient.     Appearance:   Appropriate    Eye Contact:   Good    Psychomotor Behavior: Normal    Attitude:   Cooperative    Orientation:   All   Speech    Rate /  Production: Normal/ Responsive    Volume:  Normal    Mood:    Anxious    Affect:    Appropriate    Thought Content:  Clear    Thought Form:  Coherent    Insight:    Good      Medication Review:   No changes to current psychiatric medication(s)     Medication Compliance:   Yes     Changes in Health Issues:   None reported     Chemical Use Review:   Substance Use: Chemical use reviewed, no active concerns identified Nothing used since 2021.     Tobacco Use: No current tobacco use.      Diagnosis:  1. Bipolar 2 disorder (H)    2. Generalized anxiety disorder    3. Trauma and stressor-related disorder          Collateral Reports Completed:   Not Applicable    PLAN: (Patient Tasks / Therapist Tasks / Other)  Continue looking at records  Continue to process past        There has been demonstrated improvement in functioning while patient has been engaged in psychotherapy/psychological service- if withdrawn the patient would deteriorate and/or relapse.     MICAELA SLADE, [facility]   2023                                                        ______________________________________________________________________    Individual Treatment Plan    Patient's Name: Randi Cleary  YOB: 1955    Date of Creation: 20  Date Treatment Plan Last Reviewed/Revised: 23    DSM5 Diagnoses: 296.89 Bipolar II Disorder Depressed, 300.02 (F41.1) Generalized Anxiety Disorder, or Adjustment Disorders  309.89 (F43.8) Other Specified Trauma and Stressor Related Disorder  Psychosocial / Contextual Factors: Patient's entire family of origin has , she now has a sister-in-law and  as support.  Relationship with  is conflictual. She is recovering from surgeries  PROMIS (reviewed every 90 days): PROMIS-10 Scores  PROMIS 10-Global Health (only subscores and total score):   PROMIS-10 Scores Only 2021 3/15/2022 3/15/2022 3/15/2022 3/24/2022 2022 2022   Global Mental Health  "Score 6 6 6 6 8 12 12   Global Physical Health Score 9 9 9 9 8 11 10   PROMIS TOTAL - SUBSCORES 15 15 15 15 16 23 22       Referral / Collaboration:  Referral to another professional/service is not indicated at this time..    Anticipated number of session for this episode of care: 50  Anticipation frequency of session: Biweekly  Anticipated Duration of each session: 53 or more minutes due to intensity of trauma symptoms  Treatment plan will be reviewed in 90 days or when goals have been changed.   There has been demonstrated improvement in functioning while patient has been engaged in psychotherapy/psychological service- if withdrawn the patient would deteriorate and/or relapse.       MeasurableTreatment Goal(s) related to diagnosis / functional impairment(s)  Goal 1: Patient will \"jumpstart, getting going with the things I need to be doing around the house as far as picking up, doing things, trying to do something every day.  Also to lessen the animosity between me and my .\"    I will know I've met my goal when my shoulders are fixed and I can see.      Objective #A (Patient Action)    Patient will  increase frequency of engaging her in ADLs .  Status: Continued - Date(s): 12/10/21, 3/9/22, 6/9/22, 9/2/22, 12/1/22, 3/2/23, 6/6/23, 9/13/23, 12/14/23    Intervention(s)  Therapist will  engage patient in CBT, specifically behavioral activation .    Objective #B  Patient will   track and record at least 5 pleasant exchanges with . Patient will be able to identify at least 5 positive traits about her  and how he relates to her .  Status: Continued - Date(s): 12/10/21, 3/9/22, 6/9/22, 9/2/22, 12/1/22, 3/2/23, 6/6/23, 9/13/23, 12/14/23    Intervention(s)  Therapist will  teach assertiveness skills and assign homework related to relationship interactions .    Objective #C  Patient will  reduce level of depressive and anxious features as evidenced by reduction in score on her CHAVO-7 and PHQ-9 (scores of " "15 and 16 at first measurment, respectively) .  Status: Continued - Date(s): 12/10/21, 3/9/22, 6/9/22, 9/2/22, 12/1/22, 3/2/23, 6/6/23, 9/13/23, 12/14/23    Intervention(s)  Therapist will  engage patient in person-centered therapy and CBT .    Patient has reviewed and agreed to the above plan.      MICAELA SLADE, [facility]  December 14, 2023                                                   Randi Cleary          SAFETY PLAN:  Step 1: Warning signs / cues (Thoughts, images, mood, situation, behavior) that a crisis may be developing:  Thoughts: \"I don't want to continue\" \"I am unwanted\"  Images: none  Thinking Processes: ruminating  Mood: anger  Behaviors: isolating/withdrawing , can't stop crying, not taking care of myself and not taking care of my responsibilities  Situations: small triggers, such as not being able to find something, or dropping something   Step 2: Coping strategies - Things I can do to take my mind off of my problems without contacting another person (relaxation technique, physical activity):  Distress Tolerance Strategies:  arts and crafts: drawing, play with my pet , listen to positive and upbeat music: any, change body temperature (ice pack/cold water)  and paced breathing/progressive muscle relaxation  Physical Activities: go for a walk, deep breathing and stretching   Focus on helpful thoughts:  \"You've been through this before, you can get through it again.\"  Step 3: People and social settings that provide distraction:                 Name: Carmen                            Name: Darien                           Name: Aleida       pool, shopping, Carmen's house, [employer]       Step 4: Remind myself of people and things that are important to me and worth living for:  Sidney, Aleida Horton, post-COVID world, options of what could be in your future        Step 5: When I am in crisis, I can ask these people to help me use my safety plan:                 Name: Sidney  Step 6: Making the environment " safe:   go to sleep/daydream  Step 7: Professionals or agencies I can contact during a crisis:  [facility] Daytime Number: [phone]  Suicide Prevention Lifeline: 8-522-158-MWGL (4534)  Crisis Text Line Service (available 24 hours a day, 7 days a week): Text [place] to 684116  Local Crisis Services: [facility] Crisis: [phone]  Adults can always access to the emPATH unit at [facility] (no phone number, utilize it like an urgent care or ER where you just show up)     Call 911 or go to my nearest emergency department.       I helped develop this safety plan and agree to use it when needed.  I have been given a copy of this plan.       Client signature _________________________________________________________________  Today s date:  11/24/2020  Adapted from Safety Plan Template 2008 Randi Poole and Robby Barba is reprinted with the express permission of the authors.  No portion of the Safety Plan Template may be reproduced without the express, written permission.  You can contact the authors at [email or web address] or [email or web address].

## 2023-12-21 NOTE — CONFIDENTIAL NOTE
Pending Prescriptions:                       Disp   Refills    oxyCODONE (ROXICODONE) 5 MG tablet        112 ta*0            Sig: Take 1 tablet (5 mg) by mouth 4 times daily           Dispense/start 12/26/23 for start 12/28/23    Cub

## 2023-12-21 NOTE — TELEPHONE ENCOUNTER
Received call from patient requesting refill(s) oxyCODONE (ROXICODONE) 5 MG tablet    Last dispensed from pharmacy on 12/01/2023    Patient's last office/virtual visit by prescribing provider on 1018/2023  Next office/virtual appointment scheduled for 12/27/2023    Last urine drug screen date 10/18/2023  Current opioid agreement on file (completed within the last year) Yes Date of opioid agreement: 10/18/2023    E-prescribe to      [id number] [place], [place] - [address]      Will route to [facility] for review and preparation of prescription(s).     Reina Buchanan MA  [facility]

## 2023-12-26 ENCOUNTER — VIRTUAL VISIT (OUTPATIENT)
Dept: PSYCHOLOGY | Facility: CLINIC | Age: 68
End: 2023-12-26
Payer: MEDICARE

## 2023-12-26 DIAGNOSIS — F31.81 BIPOLAR 2 DISORDER (H): Primary | ICD-10-CM

## 2023-12-26 DIAGNOSIS — F41.1 GENERALIZED ANXIETY DISORDER: ICD-10-CM

## 2023-12-26 DIAGNOSIS — F43.9 TRAUMA AND STRESSOR-RELATED DISORDER: ICD-10-CM

## 2023-12-26 PROCEDURE — 90837 PSYTX W PT 60 MINUTES: CPT | Mod: VID | Performed by: SOCIAL WORKER

## 2023-12-26 NOTE — PROGRESS NOTES
[facility]                                     Progress Note    Patient Name: Randi Cleary  Date: 12/26/23         Service Type: Individual     Session Start Time: 5:30 PM Session End Time: 6:25 PM     Session Length: 55 minutes    Session #: 224    Attendees: Client attended alone    Service Modality:  Video Visit:      Provider verified identity through the following two step process.  Patient provided:  Patient is known previously to provider    Telemedicine Visit: The patient's condition can be safely assessed and treated via synchronous audio and visual telemedicine encounter.      Reason for Telemedicine Visit: Patient convenience (e.g. access to timely appointments / distance to available provider)    Originating Site (Patient Location): Patient's home    Distant Site (Provider Location): [facility] [place]    Consent:  The patient/guardian has verbally consented to: the potential risks and benefits of telemedicine (video visit) versus in person care; bill my insurance or make self-payment for services provided; and responsibility for payment of non-covered services.     Patient would like the video invitation sent by:  My Chart    Mode of Communication:  Video Conference via [facility]    Distant Location (Provider):  On-site    As the provider I attest to compliance with applicable laws and regulations related to telemedicine.      DATA  Extended Session (53+ minutes): PROLONGED SERVICE IN THE OUTPATIENT SETTING REQUIRING DIRECT (FACE-TO-FACE) PATIENT CONTACT BEYOND THE USUAL SERVICE:    - Treatment protocol required additional time to complete, due to the nature of diagnosis being treated.  See Interventions section for details  Interactive Complexity: No  Crisis: No        Progress Since Last Session (Related to Symptoms / Goals / Homework):   Symptoms:  Patient reports no significant change, but appears more accepting that things may  improve despite feeling overwhelmed      Homework:  Progress made    Continue looking at records  Continue to process past     Episode of Care Goals: Satisfactory progress - ACTION (Actively working towards change); Intervened by reinforcing change plan / affirming steps taken     Current / Ongoing Stressors and Concerns:   Patient is currently socially isolated. She has a conflictual relationship with her .  She is getting minimal physical activity.  She has had several surgeries.      Treatment Objective(s) Addressed in This Session:   Patient will increase frequency of engaging her in ADLs.  Patient will track and record at least 5 pleasant exchanges with . Patient will be able to identify at least 5 positive traits about her .  Patient will reduce level of depressive and anxious features as evidenced by reduction in score on her CHAVO-7 and PHQ-9 (scores of 15 and 16 at first measurment, respectively).     Intervention:   Supportive Therapy:   Processed patient's recent difficulties. Discussed health and what she'd like to prioritize first. Discussed patient's overwhelm and problem solved, slowing down and doing one thing at a time. Discussed pressure for the holidays and facilitated exploration of this for patient.       The following assessments were completed by patient for this visit:  PHQ9:       9/27/2023     4:45 AM 10/9/2023     8:52 AM 10/16/2023     8:25 AM 10/31/2023     4:16 AM 12/4/2023    12:58 AM 12/18/2023     3:01 PM 12/19/2023     3:50 PM   PHQ-9 SCORE   PHQ-9 Total Score [facility] 17 (Moderately severe depression) 18 (Moderately severe depression) 12 (Moderate depression) 18 (Moderately severe depression) 15 (Moderately severe depression) 19 (Moderately severe depression)    PHQ-9 Total Score 17    17 18 12 18 15    15 19 16     GAD7:       8/21/2023    11:07 AM 9/5/2023     1:30 PM 9/20/2023     9:59 AM 10/9/2023     8:55 AM 10/31/2023     4:25 AM 12/1/2023    11:37 AM  12/18/2023     3:09 PM   CHAVO-7 SCORE   Total Score 13 (moderate anxiety) 15 (severe anxiety) 16 (severe anxiety) 15 (severe anxiety) 15 (severe anxiety) 14 (moderate anxiety) 16 (severe anxiety)   Total Score 13 15 16    16    16 15 15 14 16     PROMIS 10-Global Health (only subscores and total score):       7/6/2023     9:12 AM 7/21/2023    10:36 AM 7/28/2023     3:24 PM 8/7/2023     1:07 PM 9/20/2023    10:13 AM 10/31/2023     4:36 AM 12/1/2023    11:52 AM   PROMIS-10 Scores Only   Global Mental Health Score 5    5 5    5  6    6     5    5 6    Global Physical Health Score 8    8 7    7  8    8     9    9 8    PROMIS TOTAL - SUBSCORES 13    13 12    12  14    14     14    14 14        Information is confidential and restricted. Go to Review Flowsheets to unlock data.    Multiple values from one day are sorted in reverse-chronological order        ASSESSMENT: Current Emotional / Mental Status (status of significant symptoms):   Risk status (Self / Other harm or suicidal ideation)   Patient denies current fears or concerns for personal safety.   Patient denies current or recent suicidal ideation or behaviors.   Patient denies current or recent homicidal ideation or behaviors.   Patient denies current or recent self injurious behavior or ideation.   Patient denies other safety concerns.   Patient reports there has been no change in risk factors since their last session.     Patient reports there has been no change in protective factors since their last session.     A safety and risk management plan has been developed including: Patient consented to co-developed safety plan on 11/24/2020, updated 2/20/23.  Safety and risk management plan was reviewed.   Patient agreed to use safety plan should any safety concerns arise.  A copy was made available to the patient.     Appearance:   Appropriate    Eye Contact:   Good    Psychomotor Behavior: Normal    Attitude:   Cooperative    Orientation:   All   Speech    Rate /  Production: Normal/ Responsive    Volume:  Normal    Mood:    Anxious    Affect:    Appropriate    Thought Content:  Clear    Thought Form:  Coherent    Insight:    Good      Medication Review:   No changes to current psychiatric medication(s)     Medication Compliance:   Yes     Changes in Health Issues:   None reported     Chemical Use Review:   Substance Use: Chemical use reviewed, no active concerns identified Nothing used since 2021.     Tobacco Use: No current tobacco use.      Diagnosis:  1. Bipolar 2 disorder (H)    2. Generalized anxiety disorder    3. Trauma and stressor-related disorder      Collateral Reports Completed:   Not Applicable    PLAN: (Patient Tasks / Therapist Tasks / Other)  Continue looking at records  Look at taking one thing at the time  Continue to process past        There has been demonstrated improvement in functioning while patient has been engaged in psychotherapy/psychological service- if withdrawn the patient would deteriorate and/or relapse.     MICAELA SLADE, [facility]   2023                                                        ______________________________________________________________________    Individual Treatment Plan    Patient's Name: Randi Cleary  YOB: 1955    Date of Creation: 20  Date Treatment Plan Last Reviewed/Revised: 23    DSM5 Diagnoses: 296.89 Bipolar II Disorder Depressed, 300.02 (F41.1) Generalized Anxiety Disorder, or Adjustment Disorders  309.89 (F43.8) Other Specified Trauma and Stressor Related Disorder  Psychosocial / Contextual Factors: Patient's entire family of origin has , she now has a sister-in-law and  as support.  Relationship with  is conflictual. She is recovering from surgeries  PROMIS (reviewed every 90 days): PROMIS-10 Scores  PROMIS 10-Global Health (only subscores and total score):   PROMIS-10 Scores Only 2021 3/15/2022 3/15/2022 3/15/2022 3/24/2022 2022  "6/9/2022   Global Mental Health Score 6 6 6 6 8 12 12   Global Physical Health Score 9 9 9 9 8 11 10   PROMIS TOTAL - SUBSCORES 15 15 15 15 16 23 22       Referral / Collaboration:  Referral to another professional/service is not indicated at this time..    Anticipated number of session for this episode of care: 50  Anticipation frequency of session: Biweekly  Anticipated Duration of each session: 53 or more minutes due to intensity of trauma symptoms  Treatment plan will be reviewed in 90 days or when goals have been changed.   There has been demonstrated improvement in functioning while patient has been engaged in psychotherapy/psychological service- if withdrawn the patient would deteriorate and/or relapse.       MeasurableTreatment Goal(s) related to diagnosis / functional impairment(s)  Goal 1: Patient will \"jumpstart, getting going with the things I need to be doing around the house as far as picking up, doing things, trying to do something every day.  Also to lessen the animosity between me and my .\"    I will know I've met my goal when my shoulders are fixed and I can see.      Objective #A (Patient Action)    Patient will  increase frequency of engaging her in ADLs .  Status: Continued - Date(s): 12/10/21, 3/9/22, 6/9/22, 9/2/22, 12/1/22, 3/2/23, 6/6/23, 9/13/23, 12/14/23    Intervention(s)  Therapist will  engage patient in CBT, specifically behavioral activation .    Objective #B  Patient will   track and record at least 5 pleasant exchanges with . Patient will be able to identify at least 5 positive traits about her  and how he relates to her .  Status: Continued - Date(s): 12/10/21, 3/9/22, 6/9/22, 9/2/22, 12/1/22, 3/2/23, 6/6/23, 9/13/23, 12/14/23    Intervention(s)  Therapist will  teach assertiveness skills and assign homework related to relationship interactions .    Objective #C  Patient will  reduce level of depressive and anxious features as evidenced by reduction in score on " "her CHAVO-7 and PHQ-9 (scores of 15 and 16 at first measurment, respectively) .  Status: Continued - Date(s): 12/10/21, 3/9/22, 6/9/22, 9/2/22, 12/1/22, 3/2/23, 6/6/23, 9/13/23, 12/14/23    Intervention(s)  Therapist will  engage patient in person-centered therapy and CBT .    Patient has reviewed and agreed to the above plan.      MICAELA SLADE, [facility]  December 14, 2023                                                   Randi Cleary          SAFETY PLAN:  Step 1: Warning signs / cues (Thoughts, images, mood, situation, behavior) that a crisis may be developing:  Thoughts: \"I don't want to continue\" \"I am unwanted\"  Images: none  Thinking Processes: ruminating  Mood: anger  Behaviors: isolating/withdrawing , can't stop crying, not taking care of myself and not taking care of my responsibilities  Situations: small triggers, such as not being able to find something, or dropping something   Step 2: Coping strategies - Things I can do to take my mind off of my problems without contacting another person (relaxation technique, physical activity):  Distress Tolerance Strategies:  arts and crafts: drawing, play with my pet , listen to positive and upbeat music: any, change body temperature (ice pack/cold water)  and paced breathing/progressive muscle relaxation  Physical Activities: go for a walk, deep breathing and stretching   Focus on helpful thoughts:  \"You've been through this before, you can get through it again.\"  Step 3: People and social settings that provide distraction:                 Name: Carmen                            Name: Darien                           Name: Aleida       pool, shopping, Carmen's house, [employer]       Step 4: Remind myself of people and things that are important to me and worth living for:  Clifford Little Donna, post-COVID world, options of what could be in your future        Step 5: When I am in crisis, I can ask these people to help me use my safety plan:                 Name: " Sidney  Step 6: Making the environment safe:   go to sleep/daydream  Step 7: Professionals or agencies I can contact during a crisis:  [facility] Daytime Number: 324-642-7549  Suicide Prevention Lifeline: 3-309-208-QWCU (6603)  Crisis Text Line Service (available 24 hours a day, 7 days a week): Text [place] to 323387  Local Crisis Services: [facility] Crisis: 634.693.4243  Adults can always access to the emPATH unit at [facility] (no phone number, utilize it like an urgent care or ER where you just show up)     Call 911 or go to my nearest emergency department.       I helped develop this safety plan and agree to use it when needed.  I have been given a copy of this plan.       Client signature _________________________________________________________________  Today s date:  11/24/2020  Adapted from Safety Plan Template 2008 Randi Poole and Robby Barba is reprinted with the express permission of the authors.  No portion of the Safety Plan Template may be reproduced without the express, written permission.  You can contact the authors at [email or web address] or [email or web address].

## 2023-12-27 DIAGNOSIS — E89.0 POSTABLATIVE HYPOTHYROIDISM: Primary | ICD-10-CM

## 2023-12-29 ENCOUNTER — VIRTUAL VISIT (OUTPATIENT)
Dept: PSYCHIATRY | Facility: CLINIC | Age: 68
End: 2023-12-29
Payer: MEDICARE

## 2023-12-29 ENCOUNTER — LAB (OUTPATIENT)
Dept: LAB | Facility: CLINIC | Age: 68
End: 2023-12-29
Payer: MEDICARE

## 2023-12-29 VITALS — WEIGHT: 214 LBS | BODY MASS INDEX: 34.39 KG/M2 | HEIGHT: 66 IN

## 2023-12-29 DIAGNOSIS — E89.0 POSTABLATIVE HYPOTHYROIDISM: ICD-10-CM

## 2023-12-29 DIAGNOSIS — F33.2 SEVERE EPISODE OF RECURRENT MAJOR DEPRESSIVE DISORDER, WITHOUT PSYCHOTIC FEATURES (H): Primary | ICD-10-CM

## 2023-12-29 LAB
T4 FREE SERPL-MCNC: 1.75 NG/DL (ref 0.9–1.7)
TSH SERPL DL<=0.005 MIU/L-ACNC: 0.34 UIU/ML (ref 0.3–4.2)

## 2023-12-29 PROCEDURE — 84443 ASSAY THYROID STIM HORMONE: CPT

## 2023-12-29 PROCEDURE — 36415 COLL VENOUS BLD VENIPUNCTURE: CPT

## 2023-12-29 PROCEDURE — 84439 ASSAY OF FREE THYROXINE: CPT

## 2023-12-29 ASSESSMENT — PAIN SCALES - GENERAL: PAINLEVEL: SEVERE PAIN (7)

## 2023-12-29 ASSESSMENT — PATIENT HEALTH QUESTIONNAIRE - PHQ9: SUM OF ALL RESPONSES TO PHQ QUESTIONS 1-9: 20
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
## 2023-12-29 NOTE — NURSING NOTE
Patient declined allergy and medication review due to current PCP rearranged all the medications, dates and it was/is too over whelming.       Patient scored 20 on PHQ-9, #9 1 Several days.        Depression Response    Patient completed the PHQ-9 assessment for depression and scored >9? Yes  Question 9 on the PHQ-9 was positive for suicidality? Yes  Does patient have current mental health provider? Yes    Is this a virtual visit? Yes   Does patient have suicidal ideation (positive question 9)? Yes, no referral is needed.     I personally notified the following: visit provider putting the PHQ-9 score in the MSG column for provider awareness          Is the patient currently in the state of [place]? YES    Visit mode:VIDEO    If the visit is dropped, the patient can be reconnected by: VIDEO VISIT: Send to e-mail at: [email or web address]    Will anyone else be joining the visit? NO  (If patient encounters technical issues they should call [phone])    How would you like to obtain your AVS? MyChart    Are changes needed to the allergy or medication list? Pt declined med review.  Patient declined allergy and medication review due to current PCP rearranged all the medications, dates and it was/is too over whelming.     Reason for visit: Consult      Yanet KURTZ

## 2024-01-02 ENCOUNTER — OFFICE VISIT (OUTPATIENT)
Dept: PSYCHOLOGY | Facility: CLINIC | Age: 69
End: 2024-01-02
Payer: MEDICARE

## 2024-01-02 ENCOUNTER — VIRTUAL VISIT (OUTPATIENT)
Dept: PHARMACY | Facility: CLINIC | Age: 69
End: 2024-01-02
Payer: COMMERCIAL

## 2024-01-02 DIAGNOSIS — F31.81 BIPOLAR 2 DISORDER (H): Primary | ICD-10-CM

## 2024-01-02 DIAGNOSIS — F33.0 MILD EPISODE OF RECURRENT MAJOR DEPRESSIVE DISORDER (H): ICD-10-CM

## 2024-01-02 DIAGNOSIS — F41.1 GAD (GENERALIZED ANXIETY DISORDER): Primary | ICD-10-CM

## 2024-01-02 DIAGNOSIS — F41.1 GENERALIZED ANXIETY DISORDER: ICD-10-CM

## 2024-01-02 DIAGNOSIS — E03.9 HYPOTHYROIDISM, UNSPECIFIED TYPE: ICD-10-CM

## 2024-01-02 DIAGNOSIS — F43.9 TRAUMA AND STRESSOR-RELATED DISORDER: ICD-10-CM

## 2024-01-02 PROCEDURE — 99207 PR NO CHARGE LOS: CPT | Mod: 95 | Performed by: PHARMACIST

## 2024-01-02 PROCEDURE — 90837 PSYTX W PT 60 MINUTES: CPT | Performed by: SOCIAL WORKER

## 2024-01-02 RX ORDER — LAMOTRIGINE 25 MG/1
TABLET ORAL
Qty: 42 TABLET | Refills: 0 | Status: SHIPPED | OUTPATIENT
Start: 2024-01-02 | End: 2024-01-29
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
## 2024-01-02 NOTE — PROGRESS NOTES
[facility]                                     Progress Note    Patient Name: Randi Cleary  Date: 1/2/24         Service Type: Individual     Session Start Time: 11:01 AM Session End Time: 12:00 PM     Session Length: 59 minutes    Session #: 225    Attendees: Client attended alone    Service Modality:  In-person       DATA  Extended Session (53+ minutes): PROLONGED SERVICE IN THE OUTPATIENT SETTING REQUIRING DIRECT (FACE-TO-FACE) PATIENT CONTACT BEYOND THE USUAL SERVICE:    - Treatment protocol required additional time to complete, due to the nature of diagnosis being treated.  See Interventions section for details  Interactive Complexity: No  Crisis: No        Progress Since Last Session (Related to Symptoms / Goals / Homework):   Symptoms:  Patient reports some frustrations with lack of progress, but continues to focus on next steps.      Homework:  Progress made    Continue looking at records  Look at taking one thing at the time  Continue to process past     Episode of Care Goals: Satisfactory progress - ACTION (Actively working towards change); Intervened by reinforcing change plan / affirming steps taken     Current / Ongoing Stressors and Concerns:   Patient is currently socially isolated. She has a conflictual relationship with her .  She is getting minimal physical activity.  She has had several surgeries.      Treatment Objective(s) Addressed in This Session:   Patient will increase frequency of engaging her in ADLs.  Patient will track and record at least 5 pleasant exchanges with . Patient will be able to identify at least 5 positive traits about her .  Patient will reduce level of depressive and anxious features as evidenced by reduction in score on her CHAVO-7 and PHQ-9 (scores of 15 and 16 at first measurment, respectively).     Intervention:   Supportive Therapy:   Discussed THC usage and how they have concerns about how this is coming up with  interventional psychiatry, so how they are considering stopping usage. Talked about their history of use and how they use it. Discussed their family relationships and impact on her.     The following assessments were completed by patient for this visit:  PHQ9:       10/9/2023     8:52 AM 10/16/2023     8:25 AM 10/31/2023     4:16 AM 12/4/2023    12:58 AM 12/18/2023     3:01 PM 12/19/2023     3:50 PM 12/29/2023    12:48 PM   PHQ-9 SCORE   PHQ-9 Total Score MyChart 18 (Moderately severe depression) 12 (Moderate depression) 18 (Moderately severe depression) 15 (Moderately severe depression) 19 (Moderately severe depression)     PHQ-9 Total Score 18 12 18 15    15 19 16 20     GAD7:       8/21/2023    11:07 AM 9/5/2023     1:30 PM 9/20/2023     9:59 AM 10/9/2023     8:55 AM 10/31/2023     4:25 AM 12/1/2023    11:37 AM 12/18/2023     3:09 PM   CHAVO-7 SCORE   Total Score 13 (moderate anxiety) 15 (severe anxiety) 16 (severe anxiety) 15 (severe anxiety) 15 (severe anxiety) 14 (moderate anxiety) 16 (severe anxiety)   Total Score 13 15 16    16    16 15 15 14 16     PROMIS 10-Global Health (only subscores and total score):       7/6/2023     9:12 AM 7/21/2023    10:36 AM 7/28/2023     3:24 PM 8/7/2023     1:07 PM 9/20/2023    10:13 AM 10/31/2023     4:36 AM 12/1/2023    11:52 AM   PROMIS-10 Scores Only   Global Mental Health Score 5    5 5    5  6    6     5    5 6    Global Physical Health Score 8    8 7    7  8    8     9    9 8    PROMIS TOTAL - SUBSCORES 13    13 12    12  14    14     14    14 14        Information is confidential and restricted. Go to Review Flowsheets to unlock data.    Multiple values from one day are sorted in reverse-chronological order        ASSESSMENT: Current Emotional / Mental Status (status of significant symptoms):   Risk status (Self / Other harm or suicidal ideation)   Patient denies current fears or concerns for personal safety.   Patient denies current or recent suicidal ideation or  behaviors.   Patient denies current or recent homicidal ideation or behaviors.   Patient denies current or recent self injurious behavior or ideation.   Patient denies other safety concerns.   Patient reports there has been no change in risk factors since their last session.     Patient reports there has been no change in protective factors since their last session.     A safety and risk management plan has been developed including: Patient consented to co-developed safety plan on 11/24/2020, updated 2/20/23.  Safety and risk management plan was reviewed.   Patient agreed to use safety plan should any safety concerns arise.  A copy was made available to the patient.     Appearance:   Appropriate    Eye Contact:   Good    Psychomotor Behavior: Normal    Attitude:   Cooperative    Orientation:   All   Speech    Rate / Production: Normal/ Responsive    Volume:  Normal    Mood:    Anxious    Affect:    Appropriate    Thought Content:  Clear    Thought Form:  Coherent    Insight:    Good      Medication Review:   No changes to current psychiatric medication(s)     Medication Compliance:   Yes     Changes in Health Issues:   None reported     Chemical Use Review:   Substance Use: Chemical use reviewed, no active concerns identified Nothing used since August 2021.     Tobacco Use: No current tobacco use.      Diagnosis:  1. Bipolar 2 disorder (H)    2. Generalized anxiety disorder    3. Trauma and stressor-related disorder        Collateral Reports Completed:   Not Applicable    PLAN: (Patient Tasks / Therapist Tasks / Other)  Continue looking at records  Look at taking one thing at the time  Continue to process past        There has been demonstrated improvement in functioning while patient has been engaged in psychotherapy/psychological service- if withdrawn the patient would deteriorate and/or relapse.     MICAELA SLADE, [facility]   December 26, 2023                                                     "    ______________________________________________________________________    Individual Treatment Plan    Patient's Name: Randi Cleary  YOB: 1955    Date of Creation: 20  Date Treatment Plan Last Reviewed/Revised: 23    DSM5 Diagnoses: 296.89 Bipolar II Disorder Depressed, 300.02 (F41.1) Generalized Anxiety Disorder, or Adjustment Disorders  309.89 (F43.8) Other Specified Trauma and Stressor Related Disorder  Psychosocial / Contextual Factors: Patient's entire family of origin has , she now has a sister-in-law and  as support.  Relationship with  is conflictual. She is recovering from surgeries  PROMIS (reviewed every 90 days): PROMIS-10 Scores  PROMIS 10-Global Health (only subscores and total score):   PROMIS-10 Scores Only 2021 3/15/2022 3/15/2022 3/15/2022 3/24/2022 2022 2022   Global Mental Health Score 6 6 6 6 8 12 12   Global Physical Health Score 9 9 9 9 8 11 10   PROMIS TOTAL - SUBSCORES 15 15 15 15 16 23 22       Referral / Collaboration:  Referral to another professional/service is not indicated at this time..    Anticipated number of session for this episode of care: 50  Anticipation frequency of session: Biweekly  Anticipated Duration of each session: 53 or more minutes due to intensity of trauma symptoms  Treatment plan will be reviewed in 90 days or when goals have been changed.   There has been demonstrated improvement in functioning while patient has been engaged in psychotherapy/psychological service- if withdrawn the patient would deteriorate and/or relapse.       MeasurableTreatment Goal(s) related to diagnosis / functional impairment(s)  Goal 1: Patient will \"jumpstart, getting going with the things I need to be doing around the house as far as picking up, doing things, trying to do something every day.  Also to lessen the animosity between me and my .\"    I will know I've met my goal when my shoulders are fixed and I can " "see.      Objective #A (Patient Action)    Patient will  increase frequency of engaging her in ADLs .  Status: Continued - Date(s): 12/10/21, 3/9/22, 6/9/22, 9/2/22, 12/1/22, 3/2/23, 6/6/23, 9/13/23, 12/14/23    Intervention(s)  Therapist will  engage patient in CBT, specifically behavioral activation .    Objective #B  Patient will   track and record at least 5 pleasant exchanges with . Patient will be able to identify at least 5 positive traits about her  and how he relates to her .  Status: Continued - Date(s): 12/10/21, 3/9/22, 6/9/22, 9/2/22, 12/1/22, 3/2/23, 6/6/23, 9/13/23, 12/14/23    Intervention(s)  Therapist will  teach assertiveness skills and assign homework related to relationship interactions .    Objective #C  Patient will  reduce level of depressive and anxious features as evidenced by reduction in score on her CHAVO-7 and PHQ-9 (scores of 15 and 16 at first measurment, respectively) .  Status: Continued - Date(s): 12/10/21, 3/9/22, 6/9/22, 9/2/22, 12/1/22, 3/2/23, 6/6/23, 9/13/23, 12/14/23    Intervention(s)  Therapist will  engage patient in person-centered therapy and CBT .    Patient has reviewed and agreed to the above plan.      MICAELA SLADE, [facility]  December 14, 2023                                                   Randi Cleary          SAFETY PLAN:  Step 1: Warning signs / cues (Thoughts, images, mood, situation, behavior) that a crisis may be developing:  Thoughts: \"I don't want to continue\" \"I am unwanted\"  Images: none  Thinking Processes: ruminating  Mood: anger  Behaviors: isolating/withdrawing , can't stop crying, not taking care of myself and not taking care of my responsibilities  Situations: small triggers, such as not being able to find something, or dropping something   Step 2: Coping strategies - Things I can do to take my mind off of my problems without contacting another person (relaxation technique, physical activity):  Distress Tolerance Strategies:  " "arts and crafts: drawing, play with my pet , listen to positive and upbeat music: any, change body temperature (ice pack/cold water)  and paced breathing/progressive muscle relaxation  Physical Activities: go for a walk, deep breathing and stretching   Focus on helpful thoughts:  \"You've been through this before, you can get through it again.\"  Step 3: People and social settings that provide distraction:                 Name: Carmen                            Name: Darien                           Name: Aleida       pool, shopping, Carmen's house, [employer]       Step 4: Remind myself of people and things that are important to me and worth living for:  Sidney, Clifford, Aleida, post-COVID world, options of what could be in your future        Step 5: When I am in crisis, I can ask these people to help me use my safety plan:                 Name: Sidney  Step 6: Making the environment safe:   go to sleep/daydream  Step 7: Professionals or agencies I can contact during a crisis:  [facility] Daytime Number: [phone]  Suicide Prevention Lifeline: 7-947-134-CHCL (8281)  Crisis Text Line Service (available 24 hours a day, 7 days a week): Text [place] to 940155  Local Crisis Services: [facility] Crisis: [phone]  Adults can always access to the emPATH unit at [facility] (no phone number, utilize it like an urgent care or ER where you just show up)     Call 911 or go to my nearest emergency department.       I helped develop this safety plan and agree to use it when needed.  I have been given a copy of this plan.       Client signature _________________________________________________________________  Today s date:  11/24/2020  Adapted from Safety Plan Template 2008 Randi Poole and Robby Barba is reprinted with the express permission of the authors.  No portion of the Safety Plan Template may be reproduced without the express, written permission.  You can contact the authors at " [email or web address] or [email or web address].

## 2024-01-02 NOTE — Clinical Note
Hello, I met with this patient today to review some meds per psychiatry referral.  Her TSH dropped a bit in the last few months and she sees you on 1/3. Her anxiety has been higher lately, which corresponds with the lower TSH timeline. Just wanted to you know.  Let me know if you have questions. Thank you, Felicia Hicks, PharmD Medication Therapy Management Pharmacist [facility]

## 2024-01-02 NOTE — Clinical Note
She did want to start the lamotrigine. I sent Rx for 4 weeks worth, as I wasn't sure if you like to then increase to 100mg daily x 1 or 2 weeks after that. You see her on Monday, so can send further Rxs at that time. I can follow up with her in between, so let me know when you'll see her next. Thanks, Felicia

## 2024-01-02 NOTE — PATIENT INSTRUCTIONS
"Recommendations from today's MTM visit:                                                    MTM (medication therapy management) is a service provided by a clinical pharmacist designed to help you get the most of out of your medicines.   Today we reviewed what your medicines are for, how to know if they are working, that your medicines are safe and how to make your medicine regimen as easy as possible.      -start taking lamotrigine 25mg once daily.  You will take this for 2 weeks, then increase to 50mg daily for 2 weeks, then increase to 100mg daily. Follow up with Jeannette Mendoza for further instruction.     Follow-up: 1-2 months     It was great speaking with you today.  I value your experience and would be very thankful for your time in providing feedback in our clinic survey. In the next few days, you may receive an email or text message from [employer] with a link to a survey related to your  clinical pharmacist.\"     To schedule another MTM appointment, please call the clinic directly or you may call the MTM scheduling line at [phone] or toll-free at [phone].     My Clinical Pharmacist's contact information:                                                      Please feel free to contact me with any questions or concerns you have.      Felicia Hicks, PharmD  Medication Therapy Management Pharmacist  [facility]  "

## 2024-01-03 ENCOUNTER — OFFICE VISIT (OUTPATIENT)
Dept: ENDOCRINOLOGY | Facility: CLINIC | Age: 69
End: 2024-01-03
Payer: MEDICARE

## 2024-01-03 VITALS
BODY MASS INDEX: 34.72 KG/M2 | HEART RATE: 90 BPM | DIASTOLIC BLOOD PRESSURE: 79 MMHG | WEIGHT: 216 LBS | OXYGEN SATURATION: 94 % | HEIGHT: 66 IN | SYSTOLIC BLOOD PRESSURE: 133 MMHG

## 2024-01-03 DIAGNOSIS — E89.0 POSTABLATIVE HYPOTHYROIDISM: ICD-10-CM

## 2024-01-03 DIAGNOSIS — Z92.241 HX OF CORTICOSTEROID THERAPY: ICD-10-CM

## 2024-01-03 DIAGNOSIS — Z86.018 HISTORY OF PHEOCHROMOCYTOMA: ICD-10-CM

## 2024-01-03 DIAGNOSIS — Z78.0 POST-MENOPAUSAL: Primary | ICD-10-CM

## 2024-01-03 PROBLEM — R73.03 PREDIABETES: Status: ACTIVE | Noted: 2019-11-19

## 2024-01-03 PROCEDURE — 99214 OFFICE O/P EST MOD 30 MIN: CPT

## 2024-01-03 RX ORDER — LEVOTHYROXINE SODIUM 150 MCG
TABLET ORAL
Qty: 90 TABLET | Refills: 4 | Status: SHIPPED | OUTPATIENT
Start: 2024-01-03 | End: 2024-07-25

## 2024-01-03 ASSESSMENT — ENCOUNTER SYMPTOMS
SEIZURES: 0
WEIGHT LOSS: 1
SKIN CHANGES: 0
STIFFNESS: 1
BLOOD IN STOOL: 0
ALTERED TEMPERATURE REGULATION: 1
POLYPHAGIA: 0
LOSS OF CONSCIOUSNESS: 0
BLOATING: 0
EYE WATERING: 0
COUGH: 1
PALPITATIONS: 0
MYALGIAS: 1
DIFFICULTY URINATING: 1
INCREASED ENERGY: 1
DECREASED APPETITE: 1
HYPERTENSION: 1
INSOMNIA: 1
SWOLLEN GLANDS: 0
WHEEZING: 0
DIARRHEA: 1
CONSTIPATION: 0
SHORTNESS OF BREATH: 1
NAUSEA: 0
SMELL DISTURBANCE: 0
BRUISES/BLEEDS EASILY: 1
POOR WOUND HEALING: 0
MUSCLE WEAKNESS: 1
SORE THROAT: 0
HEMATURIA: 0
DISTURBANCES IN COORDINATION: 1
MUSCLE CRAMPS: 0
TASTE DISTURBANCE: 0
HALLUCINATIONS: 0
SNORES LOUDLY: 1
ARTHRALGIAS: 1
SINUS CONGESTION: 1
DOUBLE VISION: 0
DYSURIA: 0
BOWEL INCONTINENCE: 0
NAIL CHANGES: 1
RECTAL PAIN: 0
ABDOMINAL PAIN: 0
CHILLS: 0
POSTURAL DYSPNEA: 0
EYE IRRITATION: 1
WEAKNESS: 1
DECREASED CONCENTRATION: 1
PARALYSIS: 0
ORTHOPNEA: 0
BREAST PAIN: 1
WEIGHT GAIN: 0
NECK PAIN: 1
SLEEP DISTURBANCES DUE TO BREATHING: 0
POLYDIPSIA: 0
NIGHT SWEATS: 1
NECK MASS: 0
SPEECH CHANGE: 0
LIGHT-HEADEDNESS: 1
NERVOUS/ANXIOUS: 1
SPUTUM PRODUCTION: 1
TREMORS: 0
TINGLING: 1
PANIC: 1
COUGH DISTURBING SLEEP: 0
MEMORY LOSS: 0
SINUS PAIN: 0
LEG PAIN: 0
HEMOPTYSIS: 0
DIZZINESS: 1
HEARTBURN: 1
HOARSE VOICE: 0
FATIGUE: 1
DYSPNEA ON EXERTION: 1
JOINT SWELLING: 1
TROUBLE SWALLOWING: 0
HEADACHES: 1
VOMITING: 0
FEVER: 0
EYE PAIN: 1
EYE REDNESS: 1
BREAST MASS: 1
DEPRESSION: 1
FLANK PAIN: 0
BACK PAIN: 1
EXERCISE INTOLERANCE: 0
SYNCOPE: 0
HYPOTENSION: 0
NUMBNESS: 1
JAUNDICE: 0

## 2024-01-03 ASSESSMENT — PAIN SCALES - GENERAL: PAINLEVEL: SEVERE PAIN (7)

## 2024-01-03 NOTE — PATIENT INSTRUCTIONS
I recommend you reduce  the levothyroxine by 1/2 tablet per week, using the Synthroid 150  mcg tablets as follows:  MON to SAT 1 tablet/day;  SUN 0.5 tablet    Repeat labs in 3 months     Bone density dXA - I will send results on [employer]       Get me the results of the tests done by Dr Camacho    Please call and schedule at one of these locations that provide the service you need.     DEXA, CT, MRI, US, XRAY    [facility]   [place] [place] [facility] [place] [phone]   [facility] ([place], [place], [place]) [phone]   [facility] ([facility]) [phone]   [facility] ([facility]) [phone]     Welcome to the [facility]     Our Endocrinology Clinics are here to provide you with a team-based, collaborative approach in the diagnosis and treatment of patients with diabetes and endocrine disorders. The team is made up of Physicians, Physician Assistants, Certified Diabetes Educators, Registered Nurses, Medical Assistants, Emergency Medical Technicians, and many others, all of whom have the unified goal of providing our patients with high quality care.     Please see below for some helpful tips to best navigate and use the [facility] clinic:     [place] Respect: At [facility], we are committed to a respectful and safe space for all patients, visitors, and staff.  We believe that mutual respect between patients and their care team is the foundation of quality care.  It is our expectation that you will be treated with respect by your care team.  In turn, we ask that all communication with the care team (written and verbal) be respectful and free from profanity, threatening, or abusive language.  Disrespectful communication undermines our therapeutic relationship with you and may result in us being unable to continue to provide your care.    Refills: A provider must see you at least annually to prescribe and refill  medications. This is to ensure your safety as well as meet insurance and compliance regulations.    Scheduling: Many of our Providers offer both in-person or video visits. Please call to schedule any needed follow ups as soon as possible because our provider schedules fill up very quickly. Our care team has the right to require an in-person visit when they believe that it is medically necessary.    Missed Appointments: If you need to cancel or miss your scheduled appointment, please call the clinic at [phone] to reschedule.  Please note if you repeatedly miss appointments or repeatedly miss appointments without calling to inform us ahead of time (no-show), the clinic may elect to not allow you to reschedule without speaking to a manager, may require a Partnership In Care Agreement prior to rescheduling, or could result in you no longer being able to receive care from the clinic. Providing the clinic with timely notification if you have to miss an appointment, allows us to better serve the needs of all of our patients.    Primary Care Provider: Our Endocrinologists are Specialists in their field. We expect you to have a Primary Care Provider established to handle any needs outside of your diabetes and endocrine care.  We would be happy to assist you find a Primary Care Provider, if you do not have one.    Hatsize: Hatsize is a wonderful resource that allows you access to your Care Team via online or the ciara. Please ask a member of the team if you would like help creating an account. Please note that it may take up to 2 business days for a response. Hatsize messages are not reviewed on weekends or after business hours.  Emergent or urgent care needs should never be communicated via Hatsize.  If you experience a medical emergency call 911 or go to the nearest emergency room.    Labs: It is recommended that you stay within the [facility] for labs but you are welcome to obtain ordered labs (with some  exceptions) from any location of your choice as long as they are able to complete and process the needed labs. If you need us to fax orders to your preferred lab, please provide us the name and fax number of the lab you would like to go to so we can fax the orders. If your labs are drawn outside of the [facility], please have them fax the results to [phone] ([place]) or [phone] ([place]) or via [facility]. It is your responsibility to ensure that outside lab results are sent to us.    We look forward to working with you. Please do not hesitate to reach out with any questions.    Thank you,    The Endocrine Team    [facility] Address:   [place] Address:     [address] [place] [address]    Phone: [phone]  Fax: [phone]   [address] [place] [address]    Phone: [phone]  Fax: [phone]     [facility] Cost Estimate Phone Number: [phone]    General Lab and Imaging Scheduling Phone Number: [phone]

## 2024-01-03 NOTE — LETTER
1/3/2024       RE: Randi Cleary  [address] [facility] [address]     Dear Colleague,    Thank you for referring your patient, Randi Cleary, to the [facility] ENDOCRINOLOGY CLINIC [place] at [facility]. Please see a copy of my visit note below.    12/26/2023  PATIENT LAB/IMAGING STATUS : No pending lab orders       Endocrine  note    Attending Assessment/Plan :     History of left sided pheochromocytoma, 2.8 cm, removed 8/17.  This has been presumed sporadic but it might not be.    She says she had work up with Dr Camacho within the last 1-2 years .  Get me the results.     History of unilateral adrenalectomy; history of corticosteroid injection 11/16/17, history of prednisone burst to 40 mg/day on 10/16/19.      Sleep apnea , prescribed  CPAP, not using it.        Pre/diabetes by HgbA1c.   Recommend continued monitoring 1-2 times/year.  Lifestyle diet and exercise     Hypothyroidism following radioiodine treatment for  Graves' .  Treat to high normal target TSH  . She is on synthroid brand 150 mcg/day .  Reduce to 150 x 6.5/week aiming for high normal TSH   Labs om 3 ,mtjs    Obesity.  Weight is stable which is a win.      History of corticosteroid - last injection was 2 months ago    DXA     Hemocrhomatosis can affect endocrine organ function.  I am listing this so I don't forget about it.     39__ minutes spent on the date of the encounter doing chart review, history and exam, documentation and further activities as noted above.    Alee Coker MD    Chief complaint/ HISTORY OF PRESENT ILLNESS Winnie returns for follow up of hypothyroidism following 131I for Graves', history of left pheochromocytoma with unilateral adrenalectomy, prediabetes.  I last saw her 5/2021. .  At that time she was on Synthroid 150 mcg/day and she continues on this. She already had labs in anticipation of this appt . We didn't order metanephrine due to  the fact she wasn't on CPAP when we last met    She has history of Graves' treated with 131I x 2 many years ago.  She has been on LT4 since then.     Adrenal mass was lst noted in 2006.  At that time it was 1.5 cm.  On follow up imaging in 2017 it was 2.5 cm.  She underwent laparoscopic left adrenalectomy in 8/2/17 ([facility]) removing pheochromocytoma 2.8 cm.  .    Increased metanephrine noted 9/2020 labs was followed by normal  repeat plasma metanephrine 10/2020 and 24 hour urine metanephrine levels which were normal 11/2020.  Today she is reporting she has seen Dr Camacho since our last visit , to have more tests for possible pheo.  She was told they were normal.  I do not have the data.     We have the following relevant labs  4/19/17 urine cortisol 17 mcg/24 hours (3.5-45); metanephrine 719 mcg/24 hours (normal < 400), normetanephrine 409 mcg/24 hours (< 900), total metanephrine 1128 mcg/24 hours (< 1300), NE 47 mcg/24 hours (15-80), EPI 18 mcg/24 hoiurs (< 21),  ( mcg/24 hours), 24 hour urine volume 2025 12/4/19 f normetanephrine 0.89, metanephrine 0.12  2/20/2020 metanephrine 0.12, normetanephrine 0.8  8/3/2020 Fe 51, transferrin saturation 16, 25OHD 68.2, B12 755  9/9/2020 metanephrine 0.11, normetaenprhine 0.9 ,  pg/ml (700-750 supine) , epi < 25, DA < 25  10/21/2020 labs aldosterone 16, metanephrine 0.11, normetanephrine 0.49, renin activity 1.8  10/30/2020 TSH 0.37, HbbA1c 6.1%, Ca 9.8, Na 140 K 4, ferritin 20  11/2/20202 24 hour urine metanephrine 46 mg/day (), normetanephrine  186 mcg/day (), volume 2700, urine creatinine 837 mg/day  12/4/2020  [facility] TSH 0.06, free t4 1.2, free T3 2.8, reverse T3 29.6 on LT4 175 mcg/day  5/14/2021 TSH 0.87, ferritin 66, Ca 9.1, glucose 127, creatinine 0.69 Hgba1c 5.4.   5/18/2021 TSH 1.31, cholesterol 180, HDL 78, , LDl 83, NTproBNP 66 on LT4 150 mc/gday  8/31/22 TSH 1.76  1/18/2023 TSh 1.58, free T4 1.46, T3 114 on Synthroid 150  mcg/day  7/14/23 UAE < 12, HgbA1c 5.8, TSH 3.18  10/17/23 TSH 0.24, HgbA1c 5.7%, glucose 111, creatinine 0.67, Na 142, K 4.1, Ca 9.6,   12/29/23 weight 214 TSH 0.34, free T4 1.75     Imaging  5/15/1994 15 mCi 131I; prior 123I thyroid uptake scan: 69.1% 24 ilir ruprake; enlarged gland with prominent pyramidal lobe  12/7/1994: 123I thyroid uptake 52%  3/9/17 US thyroid ([facility])  6/16/17 MR abdomen ([facility]): 2.5 cm left adrenal nodule (was 1.5 11/9/06)  6/24/19 CTA chest: evidence for pulmonary artery HTN, hepatic steattosis;     REVIEW OF SYSTEMS  Working on weight loss   Losing weight; more protein - 3 meals/day; working with dietician at [place]   Don't sleep much ; sleep deprivation; insomnia  Cardiac: negative  Respiratory: not on treatment for sleep apnea -- trouble with the machine ;  Recent diagnosis gout great toe   Hands are very painful  Muscle wasting since the laminectomy  Dizziness and unbalance   No headaches  Steroid 2 months ago  in hand  Occipital nerve blocks with steroid - last was July - next is due.     Answers submitted by the patient for this visit:  Symptoms you have experienced in the last 30 days (Submitted on 1/3/2024)  General Symptoms: Yes  Skin Symptoms: Yes  HENT Symptoms: Yes  EYE SYMPTOMS: Yes-- plans to see eye doctor.   HEART SYMPTOMS: Yes  LUNG SYMPTOMS: Yes  INTESTINAL SYMPTOMS: Yes  URINARY SYMPTOMS: Yes  GYNECOLOGIC SYMPTOMS: No  BREAST SYMPTOMS: Yes  SKELETAL SYMPTOMS: Yes  BLOOD SYMPTOMS: Yes  NERVOUS SYSTEM SYMPTOMS: Yes  MENTAL HEALTH SYMPTOMS: Yes  Please answer the questions below to tell us what conditions you are experiencing: (Submitted on 1/3/2024)  Ear pain: Yes  Ear discharge: No  Hearing loss: No  Tinnitus: Yes  Nosebleeds: No  Congestion: Yes  Sinus pain: No  Trouble swallowing: No   Voice hoarseness: No  Mouth sores: No  Sore throat: No  Tooth pain: No  Gum tenderness: No  Bleeding gums: No  Change in taste: No  Change in sense of smell: No  Dry mouth:  No  Hearing aid used: No  Neck lump: No  Please answer the questions below to tell us what conditions you are experiencing: (Submitted on 1/3/2024)  Fever: No  Loss of appetite: Yes  Weight loss: Yes  Weight gain: No  Fatigue: Yes  Night sweats: Yes  Chills: No  Increased stress: Yes  Excessive hunger: No  Excessive thirst: No  Feeling hot or cold when others believe the temperature is normal: Yes  Loss of height: No  Post-operative complications: Yes  Surgical site pain: Yes  Hallucinations: No  Change in or Loss of Energy: Yes  Hyperactivity: No  Confusion: Yes   (Submitted on 1/3/2024)  Changes in moles/birth marks: No  Itching: Yes  Rashes: No  Changes in nails: Yes  Acne: Yes  Hair in places you don't want it: No  Change in facial hair: No  Warts: No  Non-healing sores: No  Scarring: Yes  Flaking of skin: No  Color changes of hands/feet in cold : No  Sun sensitivity: Yes  Skin thickening: No  Please answer the questions below to tell us what conditions you are experiencing: (Submitted on 1/3/2024)  Eye pain: Yes  Vision loss: No  Dry eyes: Yes  Watery eyes: No  Eye bulging: Yes  Double vision: No  Flashing of lights: No  Spots: No  Floaters: Yes  Redness: Yes  Crossed eyes: No  Tunnel Vision: No  Yellowing of eyes: No  Eye irritation: Yes  Please answer the questions below to tell us what condition you are experiencing: (Submitted on 1/3/2024)  Chest pain or pressure: No  Fast or irregular heartbeat: No  Pain in legs with walking: No  Trouble breathing while lying down: No  Fingers or toes appear blue: No  High blood pressure: Yes  Low blood pressure: No  Fainting: No  Murmurs: No  Pacemaker: No  Varicose veins: No  Wake up at night with shortness of breath: No  Light-headedness: Yes  Exercise intolerance: No  Please answer the questions below to tell us what condition you are experiencing: (Submitted on 1/3/2024)  Cough: Yes  Sputum or phlegm: Yes  Coughing up blood: No  Difficulty breating or shortness of  breath: Yes  Snoring: Yes  Wheezing: No  Difficulty breathing on exertion: Yes  Nighttime Cough: No  Difficulty breathing when lying flat: No  Please answer the questions below to tell us what conditions you are experiencing: (Submitted on 1/3/2024)  Heart burn or indigestion: Yes  Nausea: No  Vomiting: No  Abdominal pain: No  Bloating: No  Constipation: No  Diarrhea: Yes  Blood in stool: No  Black stools: No  Rectal or Anal pain: No  Fecal incontinence: No  Yellowing of skin or eyes: No  Vomit with blood: No  Change in stools: Yes  Please answer the questions below to tell us what condition you are experiencing: (Submitted on 1/3/2024)  Trouble holding urine or incontinence: Yes  Pain or burning: No  Trouble starting or stopping: Yes  Increased frequency of urination: No  Blood in urine: No  Decreased frequency of urination: No  Frequent nighttime urination: No  Flank pain: No  Difficulty emptying bladder: Yes  Please answer the questions below to tell us what condition you are experiencing: (Submitted on 1/3/2024)  Discharge: No  Lumps: Yes  Pain: Yes  Nipple retraction: No  Please answer the questions below to tell us what condition you are experiencing: (Submitted on 1/3/2024)  Back pain: Yes  Muscle aches: Yes  Neck pain: Yes  Swollen joints: Yes  Joint pain: Yes  Bone pain: Yes  Muscle cramps: No  Muscle weakness: Yes  Joint stiffness: Yes  Bone fracture: No  Please answer the questions below to tell us what condition you are experiencing: (Submitted on 1/3/2024)  Anemia: No  Swollen glands: No  Easy bleeding or bruising: Yes  Please answer the questions below to tell us what condition you are experiencing: (Submitted on 1/3/2024)  Trouble with coordination: Yes  Dizziness or trouble with balance: Yes  Fainting or black-out spells: No  Memory loss: No  Headache: Yes  Seizures: No  Speech problems: No  Tingling: Yes  Tremor: No  Weakness: Yes  Difficulty walking: Yes  Paralysis: No  Numbness: Yes  Please answer  the questions below to tell us what condition you are experiencing: (Submitted on 1/3/2024)  Nervous or Anxious: Yes  Depression: Yes  Trouble sleeping: Yes  Trouble thinking or concentrating: Yes  Mood changes: Yes  Panic attacks: Yes.      Past Medical History  Past Medical History:   Diagnosis Date    Bipolar 2 disorder (H)     Breast cancer (H) 1986    lumpectomy, radiation, chemo    Chronic pain syndrome     COPD (chronic obstructive pulmonary disease) (H)     asthma    Cord compression (H) 12/21/2021    Dizzy     Drug tolerance     opioid    Esophageal reflux     Fatigue     Generalized anxiety disorder     Graves disease 1994    Hemochromatosis 02/14/2018    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 Created by Conversion  Replacement Utility updated for latest IMO load Formatting of this note might be different from the original. Created by Conversion  Replacement Utility updated for latest IMO load    History of corticosteroid therapy 11/19/2019    History of partial adrenalectomy (H24) 11/19/2019    History of pheochromocytoma 11/19/2019    Hx antineoplastic chemotherapy     Hx of radiation therapy     Hyperlipidaemia     Hypertension     Impaired fasting glucose 2017    Injury of neck, whiplash 07/15/2021    Joint pain     KYAW (obstructive sleep apnea) 2016    Osteopenia     Pheochromocytoma, left 08/02/2017    laparoscopically removed    Postablative hypothyroidism 1995    Prediabetes 10/03/2019    by A1c    Psoriasis     Psoriatic arthropathy (H)     Right rotator cuff tear     RLS (restless legs syndrome)     on ropinorole    Sacroiliitis (H24)     Serotonin syndrome 08/28/2020    [facility] - While on desvenlafaxine 100mg    Snoring     Spinal stenosis     Status post coronary angiogram 10/03/2019    Urinary incontinence     Vitamin B 12 deficiency 2009    Vitamin D deficiency 2010     Past Surgical  History:   Procedure Laterality Date    ARTHRODESIS ANKLE      ARTHROPLASTY ANKLE Right 6/29/2015    Procedure: ARTHROPLASTY ANKLE;  Surgeon: Jason Coughlin MD;  Location: [facility]    ARTHROPLASTY REVISION ANKLE Right 6/29/2015    Procedure: ARTHROPLASTY REVISION ANKLE;  Surgeon: Jason Coughlin MD;  Location: [facility]    BIOPSY BREAST      BREAST BIOPSY, CORE RT/LT      COLONOSCOPY      COLONOSCOPY N/A 2/25/2021    Procedure: COLONOSCOPY;  Surgeon: Guru Elke Tolbert MD;  Location:  GI    CV CORONARY ANGIOGRAM N/A 10/3/2019    Procedure: CV CORONARY ANGIOGRAM;  Surgeon: Bryce Pierre MD;  Location:  HEART CARDIAC CATH LAB    CV RIGHT HEART CATH MEASUREMENTS RECORDED N/A 10/3/2019    Procedure: CV RIGHT HEART CATH;  Surgeon: Bryce Pierre MD;  Location:  HEART CARDIAC CATH LAB    ESOPHAGOSCOPY, GASTROSCOPY, DUODENOSCOPY (EGD), COMBINED N/A 2/25/2021    Procedure: ESOPHAGOGASTRODUODENOSCOPY, WITH BIOPSY;  Surgeon: Guru Elke Tolbert MD;  Location:  GI    EYE SURGERY  2021    HC REMOVE TONSILS/ADENOIDS,<13 Y/O      Description: Tonsillectomy With Adenoidectomy;  Recorded: 04/07/2010;    IR LUMBAR EPIDURAL STEROID INJECTION  10/26/2004    IR LUMBAR EPIDURAL STEROID INJECTION  11/16/2004    IR LUMBAR EPIDURAL STEROID INJECTION  12/21/2004    IR LUMBAR EPIDURAL STEROID INJECTION  6/8/2006    JOINT REPLACEMENT      LAMINOPLASTY CERVICAL POSTERIOR THREE+ LEVELS Left 12/21/2021    Procedure: CERVICAL 3-CERVICAL 6 LEFT OPEN DOOR LAMINOPLASTY AND LEFT CERVICAL 4-5 AND CERVICAL 6-7 POSTERIOR FORAMINOTOMY;  Surgeon: Angela Grgeory MD;  Location: [facility] OR    LAPAROSCOPIC ADRENALECTOMY Left 08/02/2017    pheochromocytoma    LAPAROSCOPIC ADRENALECTOMY Left 8/2/2017    Procedure: LAPAROSCOPIC LEFT ADRENALECTOMY, ;  Surgeon: Gab Linares MD;  Location: [facility] OR;  Service:     LENGTHEN TENDON ACHILLES Right 6/29/2015    Procedure: LENGTHEN  TENDON ACHILLES;  Surgeon: Jsaon Coughlin MD;  Location: [facility]    LUMPECTOMY BREAST      LUMPECTOMY BREAST Left 1994    MAMMOPLASTY REDUCTION Right 01/13/2015    [place]    MAMMOPLASTY REDUCTION Right     approx late 2015/early2016    MASTECTOMY      left lumpectomy with axillary node dissection    MASTECTOMY MODIFIED RADICAL      OTHER SURGICAL HISTORY Right     reconstructive breast surgery    OTHER SURGICAL HISTORY      Adrenalectomy for pheochromocytoma    WI MASTECTOMY, MODIFIED RADICAL      Description: Modified Radical Mastectomy Left Breast;  Recorded: 04/07/2010;    REPAIR HAMMER TOE Right 6/29/2015    Procedure: REPAIR HAMMER TOE;  Surgeon: Jason Coughlin MD;  Location: [facility]    TONSILLECTOMY      TONSILLECTOMY & ADENOIDECTOMY      [facility] ARTHRODESIS,ANKLE,OPEN Right     Description: Ankle Arthrodesis;  Recorded: 04/07/2010;     Breast lumpectomy 1986  Breast lumpectomy 1994  Reconstruction on right breast to make more symmmetrical to left     Medications    Current Outpatient Medications   Medication Sig Dispense Refill    albuterol (VENTOLIN HFA) 108 (90 Base) MCG/ACT inhaler Inhale 2 puffs into the lungs every 6 hours as needed for shortness of breath, wheezing or cough      allopurinol (ZYLOPRIM) 300 MG tablet Take 1 tablet (300 mg) by mouth daily 90 tablet 3    benzonatate (TESSALON) 200 MG capsule Take 1 capsule (200 mg) by mouth 3 times daily as needed for cough 30 capsule 1    Cyanocobalamin (VITAMIN B-12) 5000 MCG SUBL Place 2-3 sprays under the tongue daily Unknown dose. 2 or 3 sprays/day      ethacrynic acid (EDECRIN) 25 MG tablet Half pill every day 45 tablet 3    Fluticasone-Umeclidin-Vilanterol (TRELEGY ELLIPTA) 200-62.5-25 MCG/ACT oral inhaler Inhale 1 puff into the lungs daily 1 each 11    guaiFENesin (MUCINEX) 600 MG 12 hr tablet Take 1,200 mg by mouth 2 times daily as needed for congestion      KLOR-CON 20 MEQ CR tablet Take 1 tablet (20 mEq) by mouth daily 90 tablet 3     "lamoTRIgine (LAMICTAL) 25 MG tablet 25mg daily x 2 weeks, then 50mg daily x 2 weeks 42 tablet 0    MAGNESIUM CITRATE PO Take 235 mg by mouth at bedtime      medical cannabis (Patient's own supply) See Admin Instructions (The purpose of this order is to document that the patient reports taking medical cannabis.  This is not a prescription, and is not used to certify that the patient has a qualifying medical condition.)  Flower      naloxone (NARCAN) 4 MG/0.1ML nasal spray Spray 1 spray (4 mg) into one nostril alternating nostrils as needed for opioid reversal every 2-3 minutes until assistance arrives 0.2 mL 0    omeprazole (PRILOSEC) 20 MG DR capsule Take 1 capsule (20 mg) by mouth daily 90 capsule 3    ondansetron (ZOFRAN ODT) 4 MG ODT tab DISSOLVE 1 TABLET UNDER THE TONGUE EVERY 6 HOURS AS NEEDED FOR NAUSEA*      oxyCODONE (ROXICODONE) 5 MG tablet Take 1 tablet (5 mg) by mouth 4 times daily Dispense/start 12/26/23 for start 12/28/23 112 tablet 0    rOPINIRole (REQUIP) 2 MG tablet One tab 3 pm, one bedtime, and one during night on waking 90 tablet 3    STATIN NOT PRESCRIBED (INTENTIONAL) Please choose reason not prescribed from choices below.      SYNTHROID 150 MCG tablet Take 1 tablet (150 mcg) by mouth daily 90 tablet 4    vitamin C (ASCORBIC ACID) 1000 MG TABS Take 1,000 mg by mouth daily      vitamin D3 (CHOLECALCIFEROL) 250 mcg (47974 units) capsule Take 1 capsule by mouth once a week         Allergies  Allergies   Allergen Reactions    Serotonin Reuptake Inhibitors (Ssris) Anxiety, Difficulty breathing, Headache, Palpitations and Shortness Of Breath    Buspirone      The patient states she had serotonin syndrome    Cephalexin      Other reaction(s): unknown rxn.    Desvenlafaxine      Serotonin syndrome    Diclofenac Sodium [Diclofenac]      Serotonin syndrome and restless legs syndrome    Gabapentin      Drove on the wrong side of the highway    Levofloxacin      \"CAN'T REMEMBER\"    Penicillins      \"SORES " "IN MOUTH\"    Riluzole Difficulty breathing and Swelling    Sulfa Antibiotics      \"PT DOES NOT KNOW WHAT THE REACTION WAS\"    Topiramate Other (See Comments)     Frequent urination        Social History  Social History     Tobacco Use    Smoking status: Former     Packs/day: 2.50     Years: 20.00     Additional pack years: 0.00     Total pack years: 50.00     Types: Cigarettes     Start date: 1971     Quit date: 2000     Years since quittin.5     Passive exposure: Past    Smokeless tobacco: Never   Vaping Use    Vaping Use: Never used   Substance Use Topics    Alcohol use: Not Currently     Comment: relapse 2021 sober     Drug use: Yes     Types: Marijuana      - rearranging the house;  has DM and is also doing better with his DM     Physical Exam   GENERAL no mask; talking. She is not grossly cushingoid; cane hanging on the wal hook  /79   Pulse 90   Ht 1.67 m (5' 5.75\")   Wt 98 kg (216 lb)   LMP  (LMP Unknown)   SpO2 94%   BMI 35.13 kg/m    SKIN: normal color, temperature, texture ; eccymosis right forearm  HEENT: PER, no scleral icterus, eyelid retraction, stare, lid lag, proptosis or conjunctival injection.  .    NECK: supple.  No visible or palpable neck masses, cervical adenopathy,or goiter.   LUNGS: clear to auscultation bilaterally.   CARDIAC: RRR, S1, S2 without murmurs, rubs or gallops.    BACK: normal spinal contour.    NEURO: Alert, responds appropriately to questions,  moves all extremities, DTRs 0/4, gait normal, no tremor of the outstretched hand    DATA REVIEW     Latest Ref Rng 2021  11:13 AM 2021  10:22 AM 2022  7:48 AM 2023  1:07 PM 2023  2:14 PM   ENDO THYROID LABS-[facility]         TSH 0.40 - 4.00 mU/L 0.65  1.31       TSH 0.30 - 4.20 uIU/mL   1.76  1.58  3.18    Triiodothyronine (T3) 85 - 202 ng/dL 130    114     T3, Reverse ng/dL 9.0 - 27.0 ng/dL        FREE T4 0.90 - 1.70 ng/dL 1.57 (H)    1.46        Latest Ref Rng 10/17/2023  2:10 " PM   ENDO THYROID LABS-[facility]     TSH 0.40 - 4.00 mU/L    TSH 0.30 - 4.20 uIU/mL 0.24 (L)    Triiodothyronine (T3) 85 - 202 ng/dL    T3, Reverse ng/dL 9.0 - 27.0 ng/dL    FREE T4 0.90 - 1.70 ng/dL       Legend:  (H) High  (L) Low      Alee Coker MD

## 2024-01-03 NOTE — PROGRESS NOTES
Endocrine  note    Attending Assessment/Plan :     History of left sided pheochromocytoma, 2.8 cm, removed 8/17.  This has been presumed sporadic but it might not be.    She says she had work up with Dr Camacho within the last 1-2 years .  Get me the results.     History of unilateral adrenalectomy; history of corticosteroid injection 11/16/17, history of prednisone burst to 40 mg/day on 10/16/19.      Sleep apnea , prescribed  CPAP, not using it.        Pre/diabetes by HgbA1c.   Recommend continued monitoring 1-2 times/year.  Lifestyle diet and exercise     Hypothyroidism following radioiodine treatment for  Graves' .  Treat to high normal target TSH  . She is on synthroid brand 150 mcg/day .  Reduce to 150 x 6.5/week aiming for high normal TSH   Labs in 3 months     Addendum:   3/29/24 TSH 1.31, free T4 1.49    Obesity.  Weight is stable which is a win.      History of corticosteroid - last injection was 2 months ago    DXA    Addendum  1/22/24  use L1-L2  Lowest T-score -2.1 right femoral neck  L1-l2 bone gain 12.1%  Mean hip bone loss -7.7%     Hemocrhomatosis can affect endocrine organ function.  I am listing this so I don't forget about it.     39__ minutes spent on the date of the encounter doing chart review, history and exam, documentation and further activities as noted above.    Alee Coker MD    Chief complaint/ HISTORY OF PRESENT ILLNESS Winnie returns for follow up of hypothyroidism following 131I for Graves', history of left pheochromocytoma with unilateral adrenalectomy, prediabetes.  I last saw her 5/2021. .  At that time she was on Synthroid 150 mcg/day and she continues on this. She already had labs in anticipation of this appt . We didn't order metanephrine due to the fact she wasn't on CPAP when we last met    She has history of Graves' treated with 131I x 2 many years ago.  She has been on LT4 since then.     Adrenal mass was lst noted in 2006.  At that time it was 1.5 cm.  On follow up  imaging in 2017 it was 2.5 cm.  She underwent laparoscopic left adrenalectomy in 8/2/17 ([facility]) removing pheochromocytoma 2.8 cm.  .    Increased metanephrine noted 9/2020 labs was followed by normal  repeat plasma metanephrine 10/2020 and 24 hour urine metanephrine levels which were normal 11/2020.  Today she is reporting she has seen Dr Camacho since our last visit , to have more tests for possible pheo.  She was told they were normal.  I do not have the data.     We have the following relevant labs  4/19/17 urine cortisol 17 mcg/24 hours (3.5-45); metanephrine 719 mcg/24 hours (normal < 400), normetanephrine 409 mcg/24 hours (< 900), total metanephrine 1128 mcg/24 hours (< 1300), NE 47 mcg/24 hours (15-80), EPI 18 mcg/24 hoiurs (< 21),  ( mcg/24 hours), 24 hour urine volume 2025 12/4/19 f normetanephrine 0.89, metanephrine 0.12  2/20/2020 metanephrine 0.12, normetanephrine 0.8  8/3/2020 Fe 51, transferrin saturation 16, 25OHD 68.2, B12 755  9/9/2020 metanephrine 0.11, normetaenprhine 0.9 ,  pg/ml (700-750 supine) , epi < 25, DA < 25  10/21/2020 labs aldosterone 16, metanephrine 0.11, normetanephrine 0.49, renin activity 1.8  10/30/2020 TSH 0.37, HbbA1c 6.1%, Ca 9.8, Na 140 K 4, ferritin 20  11/2/20202 24 hour urine metanephrine 46 mg/day (), normetanephrine  186 mcg/day (), volume 2700, urine creatinine 837 mg/day  12/4/2020  [facility] TSH 0.06, free t4 1.2, free T3 2.8, reverse T3 29.6 on LT4 175 mcg/day  5/14/2021 TSH 0.87, ferritin 66, Ca 9.1, glucose 127, creatinine 0.69 Hgba1c 5.4.   5/18/2021 TSH 1.31, cholesterol 180, HDL 78, , LDl 83, NTproBNP 66 on LT4 150 mc/gday  8/31/22 TSH 1.76  1/18/2023 TSh 1.58, free T4 1.46, T3 114 on Synthroid 150 mcg/day  7/14/23 UAE < 12, HgbA1c 5.8, TSH 3.18  10/17/23 TSH 0.24, HgbA1c 5.7%, glucose 111, creatinine 0.67, Na 142, K 4.1, Ca 9.6,   12/29/23 weight 214 TSH 0.34, free T4 1.75     Imaging  5/15/1994 15 mCi 131I; prior 123I  thyroid uptake scan: 69.1% 24 ilir ese; enlarged gland with prominent pyramidal lobe  12/7/1994: 123I thyroid uptake 52%  3/9/17 US thyroid ([facility])  6/16/17 MR abdomen ([facility]): 2.5 cm left adrenal nodule (was 1.5 11/9/06)  6/24/19 CTA chest: evidence for pulmonary artery HTN, hepatic steattosis;     REVIEW OF SYSTEMS  Working on weight loss   Losing weight; more protein - 3 meals/day; working with dietician at [place]   Don't sleep much ; sleep deprivation; insomnia  Cardiac: negative  Respiratory: not on treatment for sleep apnea -- trouble with the machine ;  Recent diagnosis gout great toe   Hands are very painful  Muscle wasting since the laminectomy  Dizziness and unbalance   No headaches  Steroid 2 months ago  in hand  Occipital nerve blocks with steroid - last was July - next is due.     Answers submitted by the patient for this visit:  Symptoms you have experienced in the last 30 days (Submitted on 1/3/2024)  General Symptoms: Yes  Skin Symptoms: Yes  HENT Symptoms: Yes  EYE SYMPTOMS: Yes-- plans to see eye doctor.   HEART SYMPTOMS: Yes  LUNG SYMPTOMS: Yes  INTESTINAL SYMPTOMS: Yes  URINARY SYMPTOMS: Yes  GYNECOLOGIC SYMPTOMS: No  BREAST SYMPTOMS: Yes  SKELETAL SYMPTOMS: Yes  BLOOD SYMPTOMS: Yes  NERVOUS SYSTEM SYMPTOMS: Yes  MENTAL HEALTH SYMPTOMS: Yes  Please answer the questions below to tell us what conditions you are experiencing: (Submitted on 1/3/2024)  Ear pain: Yes  Ear discharge: No  Hearing loss: No  Tinnitus: Yes  Nosebleeds: No  Congestion: Yes  Sinus pain: No  Trouble swallowing: No   Voice hoarseness: No  Mouth sores: No  Sore throat: No  Tooth pain: No  Gum tenderness: No  Bleeding gums: No  Change in taste: No  Change in sense of smell: No  Dry mouth: No  Hearing aid used: No  Neck lump: No  Please answer the questions below to tell us what conditions you are experiencing: (Submitted on 1/3/2024)  Fever: No  Loss of appetite: Yes  Weight loss: Yes  Weight gain: No  Fatigue:  Yes  Night sweats: Yes  Chills: No  Increased stress: Yes  Excessive hunger: No  Excessive thirst: No  Feeling hot or cold when others believe the temperature is normal: Yes  Loss of height: No  Post-operative complications: Yes  Surgical site pain: Yes  Hallucinations: No  Change in or Loss of Energy: Yes  Hyperactivity: No  Confusion: Yes   (Submitted on 1/3/2024)  Changes in moles/birth marks: No  Itching: Yes  Rashes: No  Changes in nails: Yes  Acne: Yes  Hair in places you don't want it: No  Change in facial hair: No  Warts: No  Non-healing sores: No  Scarring: Yes  Flaking of skin: No  Color changes of hands/feet in cold : No  Sun sensitivity: Yes  Skin thickening: No  Please answer the questions below to tell us what conditions you are experiencing: (Submitted on 1/3/2024)  Eye pain: Yes  Vision loss: No  Dry eyes: Yes  Watery eyes: No  Eye bulging: Yes  Double vision: No  Flashing of lights: No  Spots: No  Floaters: Yes  Redness: Yes  Crossed eyes: No  Tunnel Vision: No  Yellowing of eyes: No  Eye irritation: Yes  Please answer the questions below to tell us what condition you are experiencing: (Submitted on 1/3/2024)  Chest pain or pressure: No  Fast or irregular heartbeat: No  Pain in legs with walking: No  Trouble breathing while lying down: No  Fingers or toes appear blue: No  High blood pressure: Yes  Low blood pressure: No  Fainting: No  Murmurs: No  Pacemaker: No  Varicose veins: No  Wake up at night with shortness of breath: No  Light-headedness: Yes  Exercise intolerance: No  Please answer the questions below to tell us what condition you are experiencing: (Submitted on 1/3/2024)  Cough: Yes  Sputum or phlegm: Yes  Coughing up blood: No  Difficulty breating or shortness of breath: Yes  Snoring: Yes  Wheezing: No  Difficulty breathing on exertion: Yes  Nighttime Cough: No  Difficulty breathing when lying flat: No  Please answer the questions below to tell us what conditions you are experiencing:  (Submitted on 1/3/2024)  Heart burn or indigestion: Yes  Nausea: No  Vomiting: No  Abdominal pain: No  Bloating: No  Constipation: No  Diarrhea: Yes  Blood in stool: No  Black stools: No  Rectal or Anal pain: No  Fecal incontinence: No  Yellowing of skin or eyes: No  Vomit with blood: No  Change in stools: Yes  Please answer the questions below to tell us what condition you are experiencing: (Submitted on 1/3/2024)  Trouble holding urine or incontinence: Yes  Pain or burning: No  Trouble starting or stopping: Yes  Increased frequency of urination: No  Blood in urine: No  Decreased frequency of urination: No  Frequent nighttime urination: No  Flank pain: No  Difficulty emptying bladder: Yes  Please answer the questions below to tell us what condition you are experiencing: (Submitted on 1/3/2024)  Discharge: No  Lumps: Yes  Pain: Yes  Nipple retraction: No  Please answer the questions below to tell us what condition you are experiencing: (Submitted on 1/3/2024)  Back pain: Yes  Muscle aches: Yes  Neck pain: Yes  Swollen joints: Yes  Joint pain: Yes  Bone pain: Yes  Muscle cramps: No  Muscle weakness: Yes  Joint stiffness: Yes  Bone fracture: No  Please answer the questions below to tell us what condition you are experiencing: (Submitted on 1/3/2024)  Anemia: No  Swollen glands: No  Easy bleeding or bruising: Yes  Please answer the questions below to tell us what condition you are experiencing: (Submitted on 1/3/2024)  Trouble with coordination: Yes  Dizziness or trouble with balance: Yes  Fainting or black-out spells: No  Memory loss: No  Headache: Yes  Seizures: No  Speech problems: No  Tingling: Yes  Tremor: No  Weakness: Yes  Difficulty walking: Yes  Paralysis: No  Numbness: Yes  Please answer the questions below to tell us what condition you are experiencing: (Submitted on 1/3/2024)  Nervous or Anxious: Yes  Depression: Yes  Trouble sleeping: Yes  Trouble thinking or concentrating: Yes  Mood changes: Yes  Panic  attacks: Yes.      Past Medical History  Past Medical History:   Diagnosis Date    Bipolar 2 disorder (H)     Breast cancer (H) 1986    lumpectomy, radiation, chemo    Chronic pain syndrome     COPD (chronic obstructive pulmonary disease) (H)     asthma    Cord compression (H) 12/21/2021    Dizzy     Drug tolerance     opioid    Esophageal reflux     Fatigue     Generalized anxiety disorder     Graves disease 1994    Hemochromatosis 02/14/2018    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 Created by Conversion  Replacement Utility updated for latest IMO load Formatting of this note might be different from the original. Created by Conversion  Replacement Utility updated for latest IMO load    History of corticosteroid therapy 11/19/2019    History of partial adrenalectomy (H24) 11/19/2019    History of pheochromocytoma 11/19/2019    Hx antineoplastic chemotherapy     Hx of radiation therapy     Hyperlipidaemia     Hypertension     Impaired fasting glucose 2017    Injury of neck, whiplash 07/15/2021    Joint pain     KYAW (obstructive sleep apnea) 2016    Osteopenia     Pheochromocytoma, left 08/02/2017    laparoscopically removed    Postablative hypothyroidism 1995    Prediabetes 10/03/2019    by A1c    Psoriasis     Psoriatic arthropathy (H)     Right rotator cuff tear     RLS (restless legs syndrome)     on ropinorole    Sacroiliitis (H24)     Serotonin syndrome 08/28/2020    [facility] - While on desvenlafaxine 100mg    Snoring     Spinal stenosis     Status post coronary angiogram 10/03/2019    Urinary incontinence     Vitamin B 12 deficiency 2009    Vitamin D deficiency 2010     Past Surgical History:   Procedure Laterality Date    ARTHRODESIS ANKLE      ARTHROPLASTY ANKLE Right 6/29/2015    Procedure: ARTHROPLASTY ANKLE;  Surgeon: Jason Coughlin MD;  Location: [facility]    ARTHROPLASTY REVISION ANKLE Right  6/29/2015    Procedure: ARTHROPLASTY REVISION ANKLE;  Surgeon: Jason Coughlin MD;  Location: [facility]    BIOPSY BREAST      BREAST BIOPSY, CORE RT/LT      COLONOSCOPY      COLONOSCOPY N/A 2/25/2021    Procedure: COLONOSCOPY;  Surgeon: Guru Elke Tolbert MD;  Location:  GI    CV CORONARY ANGIOGRAM N/A 10/3/2019    Procedure: CV CORONARY ANGIOGRAM;  Surgeon: Bryce Pierre MD;  Location:  HEART CARDIAC CATH LAB    CV RIGHT HEART CATH MEASUREMENTS RECORDED N/A 10/3/2019    Procedure: CV RIGHT HEART CATH;  Surgeon: Bryce Pierre MD;  Location:  HEART CARDIAC CATH LAB    ESOPHAGOSCOPY, GASTROSCOPY, DUODENOSCOPY (EGD), COMBINED N/A 2/25/2021    Procedure: ESOPHAGOGASTRODUODENOSCOPY, WITH BIOPSY;  Surgeon: Guru Elke Tolebrt MD;  Location:  GI    EYE SURGERY  2021    HC REMOVE TONSILS/ADENOIDS,<9 Y/O      Description: Tonsillectomy With Adenoidectomy;  Recorded: 04/07/2010;    IR LUMBAR EPIDURAL STEROID INJECTION  10/26/2004    IR LUMBAR EPIDURAL STEROID INJECTION  11/16/2004    IR LUMBAR EPIDURAL STEROID INJECTION  12/21/2004    IR LUMBAR EPIDURAL STEROID INJECTION  6/8/2006    JOINT REPLACEMENT      LAMINOPLASTY CERVICAL POSTERIOR THREE+ LEVELS Left 12/21/2021    Procedure: CERVICAL 3-CERVICAL 6 LEFT OPEN DOOR LAMINOPLASTY AND LEFT CERVICAL 4-5 AND CERVICAL 6-7 POSTERIOR FORAMINOTOMY;  Surgeon: Angela Gregory MD;  Location: [facility] OR    LAPAROSCOPIC ADRENALECTOMY Left 08/02/2017    pheochromocytoma    LAPAROSCOPIC ADRENALECTOMY Left 8/2/2017    Procedure: LAPAROSCOPIC LEFT ADRENALECTOMY, ;  Surgeon: Gab Linares MD;  Location: [facility] OR;  Service:     LENGTHEN TENDON ACHILLES Right 6/29/2015    Procedure: LENGTHEN TENDON ACHILLES;  Surgeon: Jason Coughlin MD;  Location: [facility]    LUMPECTOMY BREAST      LUMPECTOMY BREAST Left 1994    MAMMOPLASTY REDUCTION Right 01/13/2015    [place]    MAMMOPLASTY REDUCTION Right     approx late  2015/early2016    MASTECTOMY      left lumpectomy with axillary node dissection    MASTECTOMY MODIFIED RADICAL      OTHER SURGICAL HISTORY Right     reconstructive breast surgery    OTHER SURGICAL HISTORY      Adrenalectomy for pheochromocytoma    NH MASTECTOMY, MODIFIED RADICAL      Description: Modified Radical Mastectomy Left Breast;  Recorded: 04/07/2010;    REPAIR HAMMER TOE Right 6/29/2015    Procedure: REPAIR HAMMER TOE;  Surgeon: Jason Coughlin MD;  Location: [facility]    TONSILLECTOMY      TONSILLECTOMY & ADENOIDECTOMY      [facility] ARTHRODESIS,ANKLE,OPEN Right     Description: Ankle Arthrodesis;  Recorded: 04/07/2010;     Breast lumpectomy 1986  Breast lumpectomy 1994  Reconstruction on right breast to make more symmmetrical to left     Medications    Current Outpatient Medications   Medication Sig Dispense Refill    albuterol (VENTOLIN HFA) 108 (90 Base) MCG/ACT inhaler Inhale 2 puffs into the lungs every 6 hours as needed for shortness of breath, wheezing or cough      allopurinol (ZYLOPRIM) 300 MG tablet Take 1 tablet (300 mg) by mouth daily 90 tablet 3    benzonatate (TESSALON) 200 MG capsule Take 1 capsule (200 mg) by mouth 3 times daily as needed for cough 30 capsule 1    Cyanocobalamin (VITAMIN B-12) 5000 MCG SUBL Place 2-3 sprays under the tongue daily Unknown dose. 2 or 3 sprays/day      ethacrynic acid (EDECRIN) 25 MG tablet Half pill every day 45 tablet 3    Fluticasone-Umeclidin-Vilanterol (TRELEGY ELLIPTA) 200-62.5-25 MCG/ACT oral inhaler Inhale 1 puff into the lungs daily 1 each 11    guaiFENesin (MUCINEX) 600 MG 12 hr tablet Take 1,200 mg by mouth 2 times daily as needed for congestion      KLOR-CON 20 MEQ CR tablet Take 1 tablet (20 mEq) by mouth daily 90 tablet 3    lamoTRIgine (LAMICTAL) 25 MG tablet 25mg daily x 2 weeks, then 50mg daily x 2 weeks 42 tablet 0    MAGNESIUM CITRATE PO Take 235 mg by mouth at bedtime      medical cannabis (Patient's own supply) See Admin Instructions (The  "purpose of this order is to document that the patient reports taking medical cannabis.  This is not a prescription, and is not used to certify that the patient has a qualifying medical condition.)  Flower      naloxone (NARCAN) 4 MG/0.1ML nasal spray Spray 1 spray (4 mg) into one nostril alternating nostrils as needed for opioid reversal every 2-3 minutes until assistance arrives 0.2 mL 0    omeprazole (PRILOSEC) 20 MG DR capsule Take 1 capsule (20 mg) by mouth daily 90 capsule 3    ondansetron (ZOFRAN ODT) 4 MG ODT tab DISSOLVE 1 TABLET UNDER THE TONGUE EVERY 6 HOURS AS NEEDED FOR NAUSEA*      oxyCODONE (ROXICODONE) 5 MG tablet Take 1 tablet (5 mg) by mouth 4 times daily Dispense/start 12/26/23 for start 12/28/23 112 tablet 0    rOPINIRole (REQUIP) 2 MG tablet One tab 3 pm, one bedtime, and one during night on waking 90 tablet 3    STATIN NOT PRESCRIBED (INTENTIONAL) Please choose reason not prescribed from choices below.      SYNTHROID 150 MCG tablet Take 1 tablet (150 mcg) by mouth daily 90 tablet 4    vitamin C (ASCORBIC ACID) 1000 MG TABS Take 1,000 mg by mouth daily      vitamin D3 (CHOLECALCIFEROL) 250 mcg (46332 units) capsule Take 1 capsule by mouth once a week         Allergies  Allergies   Allergen Reactions    Serotonin Reuptake Inhibitors (Ssris) Anxiety, Difficulty breathing, Headache, Palpitations and Shortness Of Breath    Buspirone      The patient states she had serotonin syndrome    Cephalexin      Other reaction(s): unknown rxn.    Desvenlafaxine      Serotonin syndrome    Diclofenac Sodium [Diclofenac]      Serotonin syndrome and restless legs syndrome    Gabapentin      [place]rove on the wrong side of the highway    Levofloxacin      \"CAN'T REMEMBER\"    Penicillins      \"SORES IN MOUTH\"    Riluzole Difficulty breathing and Swelling    Sulfa Antibiotics      \"PT DOES NOT KNOW WHAT THE REACTION WAS\"    Topiramate Other (See Comments)     Frequent urination        Social History  Social History " "    Tobacco Use    Smoking status: Former     Packs/day: 2.50     Years: 20.00     Additional pack years: 0.00     Total pack years: 50.00     Types: Cigarettes     Start date: 1971     Quit date: 2000     Years since quittin.5     Passive exposure: Past    Smokeless tobacco: Never   Vaping Use    Vaping Use: Never used   Substance Use Topics    Alcohol use: Not Currently     Comment: relapse 2021 sober     Drug use: Yes     Types: Marijuana      - rearranging the house;  has DM and is also doing better with his DM     Physical Exam   GENERAL no mask; talking. She is not grossly cushingoid; cane hanging on the wal hook  /79   Pulse 90   Ht 1.67 m (5' 5.75\")   Wt 98 kg (216 lb)   LMP  (LMP Unknown)   SpO2 94%   BMI 35.13 kg/m    SKIN: normal color, temperature, texture ; eccymosis right forearm  HEENT: PER, no scleral icterus, eyelid retraction, stare, lid lag, proptosis or conjunctival injection.  .    NECK: supple.  No visible or palpable neck masses, cervical adenopathy,or goiter.   LUNGS: clear to auscultation bilaterally.   CARDIAC: RRR, S1, S2 without murmurs, rubs or gallops.    BACK: normal spinal contour.    NEURO: Alert, responds appropriately to questions,  moves all extremities, DTRs 0/4, gait normal, no tremor of the outstretched hand    DATA REVIEW     Latest Ref Rng 2021  11:13 AM 2021  10:22 AM 2022  7:48 AM 2023  1:07 PM 2023  2:14 PM   ENDO THYROID LABS-[facility]         TSH 0.40 - 4.00 mU/L 0.65  1.31       TSH 0.30 - 4.20 uIU/mL   1.76  1.58  3.18    Triiodothyronine (T3) 85 - 202 ng/dL 130    114     T3, Reverse ng/dL 9.0 - 27.0 ng/dL        FREE T4 0.90 - 1.70 ng/dL 1.57 (H)    1.46        Latest Ref Rng 10/17/2023  2:10 PM   ENDO THYROID LABS-[facility]     TSH 0.40 - 4.00 mU/L    TSH 0.30 - 4.20 uIU/mL 0.24 (L)    Triiodothyronine (T3) 85 - 202 ng/dL    T3, Reverse ng/dL 9.0 - 27.0 ng/dL    FREE T4 0.90 - 1.70 ng/dL       Legend:  (H) " High  (L) Low    EXAM: DX HIP/PELVIS/SPINE, DX WRIST/HEEL/RADIUS  LOCATION: [facility]  DATE: 1/22/2024     INDICATION:  Postablative hypothyroidism, Post-menopausal, Hx of corticosteroid therapy.  DEMOGRAPHICS: Age- 68 years. Gender- Female. Menopausal status- Postmenopausal.  COMPARISON: 12/31/2008.  TECHNIQUE: Dual-energy x-ray absorptiometry (DXA) performed with routine technique. Trabecular bone score (TBS) analysis performed.     FINDINGS:     DXA RESULTS  -Lumbar Spine: L1-L2: BMD: 1.151 g/cm2. T-score: -0.1. Z-score: 0.5.   -RIGHT Hip Total: BMD: 0.746 g/cm2. T-score: -2.1. Z-score: -1.4.  -RIGHT Hip Femoral neck: BMD: 0.742 g/cm2. T-score: -2.1. Z-score: -1.1.  -LEFT Hip Total: BMD: 0.805 g/cm2. T-score: -1.6. Z-score: -0.9.  -LEFT Hip Femoral neck BMD: 0.820 g/cm2. T-score: -1.6. Z-score: -0.6.  -LEFT Radius 33%: BMD: 0.662 g/cm2. T-score: -0.6. Z-score: 1.2.     WHO T-SCORE CRITERIA  -Normal: T score at or above -1 SD  -Osteopenia: T score between -1 and -2.5 SD  -Osteoporosis: T score at or below -2.5 SD     The World Health Organization (WHO) criteria is applicable to perimenopausal females, postmenopausal females, and men aged 50 years or older.     TBS RESULTS  -Lumbar Spine L1-L2: TBS: 1.332. TBS T-score: -1.7. TBS Z-score: 0.8     The TBS is a DXA derived measurement for fracture risk assessment, and reflects the structural condition of the bone microarchitecture. It can be used to adjust WHO Fracture Risk Assessment Tool (FRAX) probability of fracture in postmenopausal women and   older men. The calculated probabilities of fracture have been shown to be more accurate when computed with the TBS.     INTERVAL CHANGE  -There has been a 12.1% increase in lumbar spine BMD.  -There has been a -7.7% decrease in right hip BMD.     FRACTURE RISK  -FRAX Results: The 10 year probability of major osteoporotic fracture is 18.4%, and of hip fracture is 4.3%, based on the right femoral neck  BMD.  -TBS-adjusted FRAX Results: The 10 year probability of major osteoporotic fracture is 16.9%, and of hip fracture is 4.0%.     RECOMMENDATIONS  Consider treatment if major osteoporotic fracture score is greater than or equal to 20%, and if the hip fracture score is greater than or equal to 3%.                                                                      IMPRESSION: Low bone density (OSTEOPENIA). T score meets the WHO criteria for low bone density (osteopenia) at one or more measured sites. The risk of osteoporotic fracture increases approximately two-fold for each standard deviation decrease in T-score.

## 2024-01-04 ENCOUNTER — TRANSFERRED RECORDS (OUTPATIENT)
Dept: HEALTH INFORMATION MANAGEMENT | Facility: CLINIC | Age: 69
End: 2024-01-04
Payer: MEDICARE

## 2024-01-04 ENCOUNTER — PATIENT OUTREACH (OUTPATIENT)
Dept: CARE COORDINATION | Facility: CLINIC | Age: 69
End: 2024-01-04
Payer: MEDICARE

## 2024-01-04 ENCOUNTER — TELEPHONE (OUTPATIENT)
Dept: ENDOCRINOLOGY | Facility: CLINIC | Age: 69
End: 2024-01-04
Payer: MEDICARE

## 2024-01-04 NOTE — TELEPHONE ENCOUNTER
LVM and sent myc x1 to schedule dexa (at pts convenience) and labs (due in April)     Ebonie Viveros on 1/4/2024 at 3:55 PM

## 2024-01-04 NOTE — PROGRESS NOTES
Clinic Care Coordination Contact  Follow Up Progress Note      Assessment: [facility] contacted patient to follow up. Patient states that she is currently driving. She states that she will call me back in a few days.    Care Gaps:    Health Maintenance Due   Topic Date Due    HF ACTION PLAN  Never done    DIABETIC FOOT EXAM  Never done    ADVANCE CARE PLANNING  Never done    COPD ACTION PLAN  Never done    ZOSTER IMMUNIZATION (1 of 2) Never done    RSV VACCINE (Pregnancy & 60+) (1 - 1-dose 60+ series) Never done    MEDICARE ANNUAL WELLNESS VISIT  Never done    EYE EXAM  12/07/2021    COVID-19 Vaccine (7 - 2023-24 season) 09/08/2023    A1C  01/17/2024    ALT  01/18/2024     Intervention/Education provided during outreach: Patient verbalized understanding, engaged in AIDET communication during patient encounter.    Plan: Patient was encouraged to reach out when she is available.    Care Coordinator will follow up in one month.    Sola Abraham \[facility]\""  Clinic Care Coordination  [facility]  [email or web address]  [phone]
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
## 2024-01-08 ENCOUNTER — VIRTUAL VISIT (OUTPATIENT)
Dept: PSYCHIATRY | Facility: CLINIC | Age: 69
End: 2024-01-08
Payer: MEDICARE

## 2024-01-08 DIAGNOSIS — F41.1 GENERALIZED ANXIETY DISORDER: ICD-10-CM

## 2024-01-08 DIAGNOSIS — F43.9 TRAUMA AND STRESSOR-RELATED DISORDER: ICD-10-CM

## 2024-01-08 DIAGNOSIS — F31.81 BIPOLAR 2 DISORDER (H): Primary | ICD-10-CM

## 2024-01-08 PROCEDURE — 90833 PSYTX W PT W E/M 30 MIN: CPT | Mod: 95

## 2024-01-08 PROCEDURE — 99214 OFFICE O/P EST MOD 30 MIN: CPT | Mod: 95

## 2024-01-08 NOTE — NURSING NOTE
Is the patient currently in the state of [place]? YES    Visit mode:VIDEO    If the visit is dropped, the patient can be reconnected by: VIDEO VISIT: Send to e-mail at: [email or web address]    Will anyone else be joining the visit? NO  (If patient encounters technical issues they should call [phone]    How would you like to obtain your AVS? MyChart    Are changes needed to the allergy or medication list? No    Reason for visit: No chief complaint on file.    Rosemary GARCIAF

## 2024-01-08 NOTE — PROGRESS NOTES
Virtual Visit Details    Type of service:  Video Visit   Video Start Time:  9:04 AM  Video End Time: 9:36 AM    Originating Location (pt. Location): Home    Distant Location (provider location):  On-site  Platform used for Video Visit: Tara

## 2024-01-11 ENCOUNTER — PATIENT OUTREACH (OUTPATIENT)
Dept: CARE COORDINATION | Facility: CLINIC | Age: 69
End: 2024-01-11
Payer: MEDICARE

## 2024-01-11 NOTE — PROGRESS NOTES
Clinic Care Coordination Contact  Follow Up Progress Note      Assessment: CCC RN spoke with patient today to follow up with on goal and to discuss any needs or concerns. Patient stated she took a break from the holidays this year, and found this helpful for her mental health. She continues to see her therapist on a regular basis and continues to feels the support from her therapist has been beneficial.   Patient reported she continues to work closely with a nutritionist through the [facility] and reported she has almost lost 30 lbs. Patient said she is feeling physically and mentally better from her weight loss.   Patient denied any other needs or concerns.     Care Gaps:    Health Maintenance Due   Topic Date Due    HF ACTION PLAN  Never done    DIABETIC FOOT EXAM  Never done    ADVANCE CARE PLANNING  Never done    COPD ACTION PLAN  Never done    ZOSTER IMMUNIZATION (1 of 2) Never done    RSV VACCINE (Pregnancy & 60+) (1 - 1-dose 60+ series) Never done    MEDICARE ANNUAL WELLNESS VISIT  Never done    EYE EXAM  12/07/2021    A1C  01/17/2024    ALT  01/18/2024       Patient accepted scheduling phone number for PCP  to schedule independently     Care Plans  Care Plan: Mental Health       Problem: Mental Health Symptoms Need Improvement       Goal: I would like to feel as though my mental health has improved.       Start Date: 9/25/2023 Expected End Date: 9/24/2024    Recent Progress: 10%    Priority: High    Note:     Barriers: history of anxiety, bipolar disorder, trauma related disorder  Strengths: strong advocate for herself  Patient expressed understanding of goal: yes  Action steps to achieve this goal:  1. I will continue to attend all appointments with my therapist Mary Kay Gomez and with my new psychiatric provide.   2. I will continue to attend all appointments with the [facility] partial-hospitalization program.   3. I will continue to the use the skills I have learned through therapy and the partial  hospitalization program.   4. I will take my medications as directed.   5. I will report progress towards this goal at schedule outreach telephone calls from my Care Coordinator.   Disucssed on 1/11/24                              Intervention/Education provided during outreach: Discussed the importance of taking her medications as directed. Encouraged her to attend all scheduled follow up appointments as scheduled. Encouraged her to contact Care Coordination for any other needs or concerns.       Outreach Frequency: monthly, more frequently as needed        Plan: CCC RN will continue to monitor, support patient with current goal and will be available to assist as nursing needs arise.     Care Coordinator will follow up in one month.

## 2024-01-15 ENCOUNTER — VIRTUAL VISIT (OUTPATIENT)
Dept: PSYCHOLOGY | Facility: CLINIC | Age: 69
End: 2024-01-15
Payer: MEDICARE

## 2024-01-15 DIAGNOSIS — F43.9 TRAUMA AND STRESSOR-RELATED DISORDER: ICD-10-CM

## 2024-01-15 DIAGNOSIS — F41.1 GENERALIZED ANXIETY DISORDER: ICD-10-CM

## 2024-01-15 DIAGNOSIS — F31.81 BIPOLAR 2 DISORDER (H): Primary | ICD-10-CM

## 2024-01-15 PROCEDURE — 90837 PSYTX W PT 60 MINUTES: CPT | Performed by: SOCIAL WORKER

## 2024-01-15 ASSESSMENT — ANXIETY QUESTIONNAIRES
3. WORRYING TOO MUCH ABOUT DIFFERENT THINGS: SEVERAL DAYS
2. NOT BEING ABLE TO STOP OR CONTROL WORRYING: SEVERAL DAYS
GAD7 TOTAL SCORE: 14
5. BEING SO RESTLESS THAT IT IS HARD TO SIT STILL: MORE THAN HALF THE DAYS
8. IF YOU CHECKED OFF ANY PROBLEMS, HOW DIFFICULT HAVE THESE MADE IT FOR YOU TO DO YOUR WORK, TAKE CARE OF THINGS AT HOME, OR GET ALONG WITH OTHER PEOPLE?: VERY DIFFICULT
6. BECOMING EASILY ANNOYED OR IRRITABLE: NEARLY EVERY DAY
GAD7 TOTAL SCORE: 14
IF YOU CHECKED OFF ANY PROBLEMS ON THIS QUESTIONNAIRE, HOW DIFFICULT HAVE THESE PROBLEMS MADE IT FOR YOU TO DO YOUR WORK, TAKE CARE OF THINGS AT HOME, OR GET ALONG WITH OTHER PEOPLE: VERY DIFFICULT
4. TROUBLE RELAXING: MORE THAN HALF THE DAYS
7. FEELING AFRAID AS IF SOMETHING AWFUL MIGHT HAPPEN: MORE THAN HALF THE DAYS
1. FEELING NERVOUS, ANXIOUS, OR ON EDGE: NEARLY EVERY DAY
7. FEELING AFRAID AS IF SOMETHING AWFUL MIGHT HAPPEN: MORE THAN HALF THE DAYS
GAD7 TOTAL SCORE: 14

## 2024-01-15 NOTE — PROGRESS NOTES
[facility]                                     Progress Note    Patient Name: Randi Cleary  Date: 1/15/24         Service Type: Individual     Session Start Time: 12:04 PM Session End Time: 1:00 PM     Session Length: 56 minutes    Session #: 226    Attendees: Client attended alone    Service Modality:  In-person       DATA  Extended Session (53+ minutes): PROLONGED SERVICE IN THE OUTPATIENT SETTING REQUIRING DIRECT (FACE-TO-FACE) PATIENT CONTACT BEYOND THE USUAL SERVICE:    - Treatment protocol required additional time to complete, due to the nature of diagnosis being treated.  See Interventions section for details  Interactive Complexity: No  Crisis: No        Progress Since Last Session (Related to Symptoms / Goals / Homework):   Symptoms:  Patient reports the previous week was a really bad week for her mental health and she cancelled all of her appointments due to overwhelm      Homework:  Progress made    Continue looking at records  Look at taking one thing at the time  Continue to process past     Episode of Care Goals: Satisfactory progress - ACTION (Actively working towards change); Intervened by reinforcing change plan / affirming steps taken     Current / Ongoing Stressors and Concerns:   Patient is currently socially isolated. She has a conflictual relationship with her .  She is getting minimal physical activity.  She has had several surgeries.      Treatment Objective(s) Addressed in This Session:   Patient will increase frequency of engaging her in ADLs.  Patient will track and record at least 5 pleasant exchanges with . Patient will be able to identify at least 5 positive traits about her .  Patient will reduce level of depressive and anxious features as evidenced by reduction in score on her CHAVO-7 and PHQ-9 (scores of 15 and 16 at first measurment, respectively).     Intervention:   Supportive Therapy:   Processed family and feeling alone, exploring  past conflict and current implications.  Discussed THC reduction and challenges with this. Processed medication change.       The following assessments were completed by patient for this visit:  PHQ9:       10/16/2023     8:25 AM 10/31/2023     4:16 AM 12/4/2023    12:58 AM 12/18/2023     3:01 PM 12/19/2023     3:50 PM 12/29/2023    12:48 PM 1/15/2024    11:57 AM   PHQ-9 SCORE   PHQ-9 Total Score MyChart 12 (Moderate depression) 18 (Moderately severe depression) 15 (Moderately severe depression) 19 (Moderately severe depression)   17 (Moderately severe depression)   PHQ-9 Total Score 12 18 15    15 19 16 20 17     GAD7:       9/5/2023     1:30 PM 9/20/2023     9:59 AM 10/9/2023     8:55 AM 10/31/2023     4:25 AM 12/1/2023    11:37 AM 12/18/2023     3:09 PM 1/15/2024    12:02 PM   CHAVO-7 SCORE   Total Score 15 (severe anxiety) 16 (severe anxiety) 15 (severe anxiety) 15 (severe anxiety) 14 (moderate anxiety) 16 (severe anxiety) 14 (moderate anxiety)   Total Score 15 16    16    16 15 15 14 16 14     PROMIS 10-Global Health (only subscores and total score):       7/6/2023     9:12 AM 7/21/2023    10:36 AM 7/28/2023     3:24 PM 8/7/2023     1:07 PM 9/20/2023    10:13 AM 10/31/2023     4:36 AM 12/1/2023    11:52 AM   PROMIS-10 Scores Only   Global Mental Health Score 5    5 5    5  6    6     5    5 6    Global Physical Health Score 8    8 7    7  8    8     9    9 8    PROMIS TOTAL - SUBSCORES 13    13 12    12  14    14     14    14 14        Information is confidential and restricted. Go to Review Flowsheets to unlock data.    Multiple values from one day are sorted in reverse-chronological order        ASSESSMENT: Current Emotional / Mental Status (status of significant symptoms):   Risk status (Self / Other harm or suicidal ideation)   Patient denies current fears or concerns for personal safety.   Patient denies current or recent suicidal ideation or behaviors.   Patient denies current or recent homicidal ideation  or behaviors.   Patient denies current or recent self injurious behavior or ideation.   Patient denies other safety concerns.   Patient reports there has been no change in risk factors since their last session.     Patient reports there has been no change in protective factors since their last session.     A safety and risk management plan has been developed including: Patient consented to co-developed safety plan on 11/24/2020, updated 2/20/23.  Safety and risk management plan was reviewed.   Patient agreed to use safety plan should any safety concerns arise.  A copy was made available to the patient.     Appearance:   Appropriate    Eye Contact:   Good    Psychomotor Behavior: Normal    Attitude:   Cooperative    Orientation:   All   Speech    Rate / Production: Normal/ Responsive    Volume:  Normal    Mood:    Anxious    Affect:    Appropriate    Thought Content:  Clear    Thought Form:  Coherent    Insight:    Good      Medication Review:   Changes to psychiatric medications, see updated Medication List in EPIC.  Started lamictal.     Medication Compliance:   Yes     Changes in Health Issues:   None reported     Chemical Use Review:   Substance Use: Chemical use reviewed, no active concerns identified Nothing used since August 2021.     Tobacco Use: No current tobacco use.      Diagnosis:  1. Bipolar 2 disorder (H)    2. Generalized anxiety disorder    3. Trauma and stressor-related disorder          Collateral Reports Completed:   Not Applicable    PLAN: (Patient Tasks / Therapist Tasks / Other)  Continue looking at records  Look at taking one thing at the time  Continue to process past        There has been demonstrated improvement in functioning while patient has been engaged in psychotherapy/psychological service- if withdrawn the patient would deteriorate and/or relapse.     MICAELA SLADE, [facility]   January 15, 2024                                                     "    ______________________________________________________________________    Individual Treatment Plan    Patient's Name: Randi Cleary  YOB: 1955    Date of Creation: 20  Date Treatment Plan Last Reviewed/Revised: 23    DSM5 Diagnoses: 296.89 Bipolar II Disorder Depressed, 300.02 (F41.1) Generalized Anxiety Disorder, or Adjustment Disorders  309.89 (F43.8) Other Specified Trauma and Stressor Related Disorder  Psychosocial / Contextual Factors: Patient's entire family of origin has , she now has a sister-in-law and  as support.  Relationship with  is conflictual. She is recovering from surgeries  PROMIS (reviewed every 90 days): PROMIS-10 Scores  PROMIS 10-Global Health (only subscores and total score):   PROMIS-10 Scores Only 2021 3/15/2022 3/15/2022 3/15/2022 3/24/2022 2022 2022   Global Mental Health Score 6 6 6 6 8 12 12   Global Physical Health Score 9 9 9 9 8 11 10   PROMIS TOTAL - SUBSCORES 15 15 15 15 16 23 22       Referral / Collaboration:  Referral to another professional/service is not indicated at this time..    Anticipated number of session for this episode of care: 50  Anticipation frequency of session: Biweekly  Anticipated Duration of each session: 53 or more minutes due to intensity of trauma symptoms  Treatment plan will be reviewed in 90 days or when goals have been changed.   There has been demonstrated improvement in functioning while patient has been engaged in psychotherapy/psychological service- if withdrawn the patient would deteriorate and/or relapse.       MeasurableTreatment Goal(s) related to diagnosis / functional impairment(s)  Goal 1: Patient will \"jumpstart, getting going with the things I need to be doing around the house as far as picking up, doing things, trying to do something every day.  Also to lessen the animosity between me and my .\"    I will know I've met my goal when my shoulders are fixed and I can " "see.      Objective #A (Patient Action)    Patient will  increase frequency of engaging her in ADLs .  Status: Continued - Date(s): 12/10/21, 3/9/22, 6/9/22, 9/2/22, 12/1/22, 3/2/23, 6/6/23, 9/13/23, 12/14/23    Intervention(s)  Therapist will  engage patient in CBT, specifically behavioral activation .    Objective #B  Patient will   track and record at least 5 pleasant exchanges with . Patient will be able to identify at least 5 positive traits about her  and how he relates to her .  Status: Continued - Date(s): 12/10/21, 3/9/22, 6/9/22, 9/2/22, 12/1/22, 3/2/23, 6/6/23, 9/13/23, 12/14/23    Intervention(s)  Therapist will  teach assertiveness skills and assign homework related to relationship interactions .    Objective #C  Patient will  reduce level of depressive and anxious features as evidenced by reduction in score on her CHAVO-7 and PHQ-9 (scores of 15 and 16 at first measurment, respectively) .  Status: Continued - Date(s): 12/10/21, 3/9/22, 6/9/22, 9/2/22, 12/1/22, 3/2/23, 6/6/23, 9/13/23, 12/14/23    Intervention(s)  Therapist will  engage patient in person-centered therapy and CBT .    Patient has reviewed and agreed to the above plan.      MICAELA SLADE, [facility]  December 14, 2023                                                   Randi Cleary          SAFETY PLAN:  Step 1: Warning signs / cues (Thoughts, images, mood, situation, behavior) that a crisis may be developing:  Thoughts: \"I don't want to continue\" \"I am unwanted\"  Images: none  Thinking Processes: ruminating  Mood: anger  Behaviors: isolating/withdrawing , can't stop crying, not taking care of myself and not taking care of my responsibilities  Situations: small triggers, such as not being able to find something, or dropping something   Step 2: Coping strategies - Things I can do to take my mind off of my problems without contacting another person (relaxation technique, physical activity):  Distress Tolerance Strategies:  " "arts and crafts: drawing, play with my pet , listen to positive and upbeat music: any, change body temperature (ice pack/cold water)  and paced breathing/progressive muscle relaxation  Physical Activities: go for a walk, deep breathing and stretching   Focus on helpful thoughts:  \"You've been through this before, you can get through it again.\"  Step 3: People and social settings that provide distraction:                 Name: Carmen                            Name: Darien                           Name: Aleida       pool, shopping, Carmen's house, [employer]       Step 4: Remind myself of people and things that are important to me and worth living for:  Sidney, Clifford, Aleida, post-COVID world, options of what could be in your future        Step 5: When I am in crisis, I can ask these people to help me use my safety plan:                 Name: Sidney  Step 6: Making the environment safe:   go to sleep/daydream  Step 7: Professionals or agencies I can contact during a crisis:  [facility] Daytime Number: [phone]  Suicide Prevention Lifeline: 5-828-575-RRPE (4578)  Crisis Text Line Service (available 24 hours a day, 7 days a week): Text [place] to 371608  Local Crisis Services: [facility] Crisis: [phone]  Adults can always access to the emPATH unit at [facility] (no phone number, utilize it like an urgent care or ER where you just show up)     Call 911 or go to my nearest emergency department.       I helped develop this safety plan and agree to use it when needed.  I have been given a copy of this plan.       Client signature _________________________________________________________________  Today s date:  11/24/2020  Adapted from Safety Plan Template 2008 Randi Poole and Robby Barba is reprinted with the express permission of the authors.  No portion of the Safety Plan Template may be reproduced without the express, written permission.  You can contact the authors at " [email or web address] or [email or web address].

## 2024-01-17 ENCOUNTER — VIRTUAL VISIT (OUTPATIENT)
Dept: PALLIATIVE MEDICINE | Facility: OTHER | Age: 69
End: 2024-01-17
Attending: ANESTHESIOLOGY
Payer: MEDICARE

## 2024-01-17 DIAGNOSIS — M19.071 OSTEOARTHRITIS OF RIGHT ANKLE AND FOOT: ICD-10-CM

## 2024-01-17 PROCEDURE — 99214 OFFICE O/P EST MOD 30 MIN: CPT | Mod: 95 | Performed by: ANESTHESIOLOGY

## 2024-01-17 RX ORDER — OXYCODONE HYDROCHLORIDE 5 MG/1
5 TABLET ORAL 4 TIMES DAILY
Qty: 112 TABLET | Refills: 0 | Status: SHIPPED | OUTPATIENT
Start: 2024-01-17 | End: 2024-02-20

## 2024-01-17 ASSESSMENT — PAIN SCALES - GENERAL: PAINLEVEL: SEVERE PAIN (6)

## 2024-01-17 NOTE — PATIENT INSTRUCTIONS
[facility] Number:  [phone]  Call with any questions about your care and for scheduling assistance.   Calls are returned Monday through Friday between 8 AM and 4:30 PM. We usually get back to you within 2 business days depending on the issue/request.    If we are prescribing your medications:  For opioid medication refills, call the clinic or send a Sleep Numberhart message 7 days in advance.  Please include:  Name of requested medication  Name of the pharmacy.  For non-opioid medications, call your pharmacy directly to request a refill. Please allow 3-4 days to be processed.   Per [place] State Law:  All controlled substance prescriptions must be filled within 30 days of being written.    For those controlled substances allowing refills, pickup must occur within 30 days of last fill.      We believe regular attendance is key to your success in our program!    Any time you are unable to keep your appointment we ask that you call us at least 24 hours in advance to cancel.This will allow us to offer the appointment time to another patient.   Multiple missed appointments may lead to dismissal from the clinic.       PLAN:    Order for the Zynex electrostimulation device placed. To help with neck pain.  You will be contacted by the company to see if your insurance covers it and teach you how to use it.    Discussed the role of frequency specific microcurrent  May contact these providers to see if they take your insurance or you want to work with transitions (also does acupuncture)  [phone], [facility] [phone], [facility] [phone], [facility] [phone].    Cetyl myristoleate (CMO) a supplement to help with joint pain in your fingers and arthritis.  May obtain online through [employer].  Coreceutimin is onebrand.  2 capsules 3 times a day for 4 days, 2 capsules twice a day for 2 weeks, may decrease to 2 capsules daily.    Continue the  oxycodone 5 mg 4 times a day as needed.    You are starting lamotrigine to see if it helps with mood.    Follow-up with Dr. Dubois in the office in 2 to 3 months for a return visit

## 2024-01-17 NOTE — PROGRESS NOTES
"                          [facility] Follow-up    Date of visit: 1/17/2024    Chief complaint:   Chief Complaint   Patient presents with    Pain    Pain Management     Seen for video visit, for arthralgias, cervical radiculopathy  Interval history:    Reviewing the records seen in endocrinology, thought to be relatively stable from a pheochromocytoma point of view.    Seen in cardiology, discussed a right heart cath.    Seen in psychiatry to start lamotrigine.    By video reviews in general feeling better.  Her neck has become again more of a concern.  Daily she notes at the base of her skull can seem to come over the top.  Describes the muscles can feel tight \"stress\" and her vision get blurry.    She in the past has tried some occipital nerve blocks, worried about the amount of steroid she has had over time between her sciatica, shoulders, ankle, and fingers.  Worried that the skin in her arms has been thinning from steroids.    Wondered about devices such as a TENS unit.    She is pleased that she has been working with dietitian at [facility], weight is down to 208, goal to get below 200.    Review she had tried acupuncture before for musculoskeletal pains, helped her restless leg syndrome, wonder about returning her other things.    Will be getting a bone scan, notes she went into menopause age 38 after treatment for breast cancer.    Thyroid functions are being addressed.    She did see orthopedics recently regarding her shoulders.  There is some discussion of surgery.  She managed her surgery in October for her ankle okay and is recovering.    Has been using medical cannabis.  Finds it helpful also for restless legs.  However if she is starting lamotrigine the psychiatry service she would like to hold on the medical cannabis.    Wonders about supplements for her hands and arthritis.  Reviewed the uric acid has decreased since last seen.  We have not tried cetyl myristoleate.    # " Urine drug test in October, PMP reviewed        Medications:  Current Outpatient Medications   Medication Sig Dispense Refill    albuterol (VENTOLIN HFA) 108 (90 Base) MCG/ACT inhaler Inhale 2 puffs into the lungs every 6 hours as needed for shortness of breath, wheezing or cough      allopurinol (ZYLOPRIM) 300 MG tablet Take 1 tablet (300 mg) by mouth daily 90 tablet 3    benzonatate (TESSALON) 200 MG capsule Take 1 capsule (200 mg) by mouth 3 times daily as needed for cough 30 capsule 1    Cyanocobalamin (VITAMIN B-12) 5000 MCG SUBL Place 2-3 sprays under the tongue daily Unknown dose. 2 or 3 sprays/day      ethacrynic acid (EDECRIN) 25 MG tablet Half pill every day 45 tablet 3    Fluticasone-Umeclidin-Vilanterol (TRELEGY ELLIPTA) 200-62.5-25 MCG/ACT oral inhaler Inhale 1 puff into the lungs daily 1 each 11    guaiFENesin (MUCINEX) 600 MG 12 hr tablet Take 1,200 mg by mouth 2 times daily as needed for congestion      KLOR-CON 20 MEQ CR tablet Take 1 tablet (20 mEq) by mouth daily 90 tablet 3    lamoTRIgine (LAMICTAL) 25 MG tablet 25mg daily x 2 weeks, then 50mg daily x 2 weeks 42 tablet 0    MAGNESIUM CITRATE PO Take 235 mg by mouth at bedtime      medical cannabis (Patient's own supply) See Admin Instructions (The purpose of this order is to document that the patient reports taking medical cannabis.  This is not a prescription, and is not used to certify that the patient has a qualifying medical condition.)  Flower      naloxone (NARCAN) 4 MG/0.1ML nasal spray Spray 1 spray (4 mg) into one nostril alternating nostrils as needed for opioid reversal every 2-3 minutes until assistance arrives 0.2 mL 0    omeprazole (PRILOSEC) 20 MG DR capsule Take 1 capsule (20 mg) by mouth daily 90 capsule 3    ondansetron (ZOFRAN ODT) 4 MG ODT tab DISSOLVE 1 TABLET UNDER THE TONGUE EVERY 6 HOURS AS NEEDED FOR NAUSEA*      oxyCODONE (ROXICODONE) 5 MG tablet Take 1 tablet (5 mg) by mouth 4 times daily Dispense/ 1/24 got 1/26 112  tablet 0    rOPINIRole (REQUIP) 2 MG tablet One tab 3 pm, one bedtime, and one during night on waking 90 tablet 3    STATIN NOT PRESCRIBED (INTENTIONAL) Please choose reason not prescribed from choices below.      SYNTHROID 150 MCG tablet MON to SAT 1 tablet/day; SUN 0.5 tablet 90 tablet 4    vitamin C (ASCORBIC ACID) 1000 MG TABS Take 1,000 mg by mouth daily      vitamin D3 (CHOLECALCIFEROL) 250 mcg (76140 units) capsule Take 1 capsule by mouth once a week             Physical Exam:  not currently breastfeeding.    Video alert, clear sensorium.  No respiratory distress, no pain behavior.    Affect congruent              Assessment:   History of arthralgias.  Orthopedic surgeries.  Comorbid mood disorder.    Using the oxycodone seems to be helpful to some extent for symptoms and function.    Losing weight which she hopes will help some of her mobility and musculoskeletal concerns      Plan as above 38    minutes spent on the date of encounter doing chart review, history, and exam documentation and further activities as noted above.     Dusty Dubois MD  [facility]

## 2024-01-17 NOTE — PROGRESS NOTES
Patient presents to the clinic today for a follow up with AXEL WIGGINS MD  Regarding Pain Management    Winnie is a 68 year old who is being evaluated via a billable video visit.      How would you like to obtain your AVS? MyChart  If the video visit is dropped, the invitation should be resent by: Text to cell phone: [phone]  Will anyone else be joining your video visit? No        Video-Visit Details    Type of service:  Video Visit   Video Start Time:   Video End Time:    Originating Location (pt. Location): Home    Distant Location (provider location):  On-site  Platform used for Video Visit: Tara        [place] currently in [place]? Yes  NOTE: If Pt is not in [place], Appointment needs to be canceled and rescheduled          10/18/2023    10:59 AM 10/18/2023    11:00 AM 1/17/2024    10:16 AM   PEG Score   PEG Total Score 7 7 6.33       UDS/CSA-10/18/23    QUESTIONS:    Maryan Temple MA  [facility]

## 2024-01-18 ENCOUNTER — VIRTUAL VISIT (OUTPATIENT)
Dept: PSYCHOLOGY | Facility: CLINIC | Age: 69
End: 2024-01-18
Payer: MEDICARE

## 2024-01-18 DIAGNOSIS — F41.1 GENERALIZED ANXIETY DISORDER: ICD-10-CM

## 2024-01-18 DIAGNOSIS — F43.9 TRAUMA AND STRESSOR-RELATED DISORDER: ICD-10-CM

## 2024-01-18 DIAGNOSIS — F31.81 BIPOLAR 2 DISORDER (H): Primary | ICD-10-CM

## 2024-01-18 PROCEDURE — 90837 PSYTX W PT 60 MINUTES: CPT | Mod: 95 | Performed by: SOCIAL WORKER

## 2024-01-18 NOTE — PROGRESS NOTES
"    [facility]                                     Progress Note    Patient Name: Randi Cleary  Date: 1/18/24         Service Type: Individual     Session Start Time: 9:32 AM Session End Time: 10:30 AM     Session Length: 58 minutes    Session #: 227    Attendees: Client attended alone    Service Modality:  Video Visit:      Provider verified identity through the following two step process.  Patient provided:  Patient is known previously to provider    Telemedicine Visit: The patient's condition can be safely assessed and treated via synchronous audio and visual telemedicine encounter.      Reason for Telemedicine Visit: Patient convenience (e.g. access to timely appointments / distance to available provider)    Originating Site (Patient Location): Patient's home    Distant Site (Provider Location): [facility] [place]    Consent:  The patient/guardian has verbally consented to: the potential risks and benefits of telemedicine (video visit) versus in person care; bill my insurance or make self-payment for services provided; and responsibility for payment of non-covered services.     Patient would like the video invitation sent by:  My Chart    Mode of Communication:  Video Conference via [facility]    Distant Location (Provider):  On-site    As the provider I attest to compliance with applicable laws and regulations related to telemedicine.       DATA  Extended Session (53+ minutes): PROLONGED SERVICE IN THE OUTPATIENT SETTING REQUIRING DIRECT (FACE-TO-FACE) PATIENT CONTACT BEYOND THE USUAL SERVICE:    - Treatment protocol required additional time to complete, due to the nature of diagnosis being treated.  See Interventions section for details  Interactive Complexity: No  Crisis: No        Progress Since Last Session (Related to Symptoms / Goals / Homework):   Symptoms:  Patient arrived to session feeling \"wired,\" but was able to regulate some with " coaching on deep breaths.       Homework:  Progress made    Continue looking at records  Look at taking one thing at the time  Continue to process past     Episode of Care Goals: Satisfactory progress - ACTION (Actively working towards change); Intervened by reinforcing change plan / affirming steps taken     Current / Ongoing Stressors and Concerns:   Patient is currently socially isolated. She has a conflictual relationship with her .  She is getting minimal physical activity.  She has had several surgeries.      Treatment Objective(s) Addressed in This Session:   Patient will increase frequency of engaging her in ADLs.  Patient will track and record at least 5 pleasant exchanges with . Patient will be able to identify at least 5 positive traits about her .  Patient will reduce level of depressive and anxious features as evidenced by reduction in score on her CHAVO-7 and PHQ-9 (scores of 15 and 16 at first measurment, respectively).     Intervention:   Supportive Therapy:   Processed the challenges in getting her point across at her most recent doctor's appointment with her shoulder surgeon. Processed medical procedures and how they impact her.       The following assessments were completed by patient for this visit:  PHQ9:       10/16/2023     8:25 AM 10/31/2023     4:16 AM 12/4/2023    12:58 AM 12/18/2023     3:01 PM 12/19/2023     3:50 PM 12/29/2023    12:48 PM 1/15/2024    11:57 AM   PHQ-9 SCORE   PHQ-9 Total Score MyChart 12 (Moderate depression) 18 (Moderately severe depression) 15 (Moderately severe depression) 19 (Moderately severe depression)   17 (Moderately severe depression)   PHQ-9 Total Score 12 18 15    15 19 16 20 17     GAD7:       9/5/2023     1:30 PM 9/20/2023     9:59 AM 10/9/2023     8:55 AM 10/31/2023     4:25 AM 12/1/2023    11:37 AM 12/18/2023     3:09 PM 1/15/2024    12:02 PM   CHAVO-7 SCORE   Total Score 15 (severe anxiety) 16 (severe anxiety) 15 (severe anxiety) 15  (severe anxiety) 14 (moderate anxiety) 16 (severe anxiety) 14 (moderate anxiety)   Total Score 15 16    16    16 15 15 14 16 14     PROMIS 10-Global Health (only subscores and total score):       7/6/2023     9:12 AM 7/21/2023    10:36 AM 7/28/2023     3:24 PM 8/7/2023     1:07 PM 9/20/2023    10:13 AM 10/31/2023     4:36 AM 12/1/2023    11:52 AM   PROMIS-10 Scores Only   Global Mental Health Score 5    5 5    5  6    6     5    5 6    Global Physical Health Score 8    8 7    7  8    8     9    9 8    PROMIS TOTAL - SUBSCORES 13    13 12    12  14    14     14    14 14        Information is confidential and restricted. Go to Review Flowsheets to unlock data.    Multiple values from one day are sorted in reverse-chronological order        ASSESSMENT: Current Emotional / Mental Status (status of significant symptoms):   Risk status (Self / Other harm or suicidal ideation)   Patient denies current fears or concerns for personal safety.   Patient denies current or recent suicidal ideation or behaviors.   Patient denies current or recent homicidal ideation or behaviors.   Patient denies current or recent self injurious behavior or ideation.   Patient denies other safety concerns.   Patient reports there has been no change in risk factors since their last session.     Patient reports there has been no change in protective factors since their last session.     A safety and risk management plan has been developed including: Patient consented to co-developed safety plan on 11/24/2020, updated 2/20/23.  Safety and risk management plan was reviewed.   Patient agreed to use safety plan should any safety concerns arise.  A copy was made available to the patient.     Appearance:   Appropriate    Eye Contact:   Good    Psychomotor Behavior: Normal    Attitude:   Cooperative    Orientation:   All   Speech    Rate / Production: Normal/ Responsive    Volume:  Normal    Mood:    Anxious    Affect:    Appropriate    Thought  Content:  Clear    Thought Form:  Coherent    Insight:    Good      Medication Review:   No changes to current psychiatric medication(s)      Medication Compliance:   Yes     Changes in Health Issues:   None reported     Chemical Use Review:   Substance Use: Chemical use reviewed, no active concerns identified Nothing used since 2021.     Tobacco Use: No current tobacco use.      Diagnosis:  1. Bipolar 2 disorder (H)    2. Generalized anxiety disorder    3. Trauma and stressor-related disorder        Collateral Reports Completed:   Not Applicable    PLAN: (Patient Tasks / Therapist Tasks / Other)  Continue looking at records  Look at taking one thing at the time  Continue to process past        There has been demonstrated improvement in functioning while patient has been engaged in psychotherapy/psychological service- if withdrawn the patient would deteriorate and/or relapse.     MICAELA SLADE, [facility]   2024                                                        ______________________________________________________________________    Individual Treatment Plan    Patient's Name: Randi Cleary  YOB: 1955    Date of Creation: 20  Date Treatment Plan Last Reviewed/Revised: 23    DSM5 Diagnoses: 296.89 Bipolar II Disorder Depressed, 300.02 (F41.1) Generalized Anxiety Disorder, or Adjustment Disorders  309.89 (F43.8) Other Specified Trauma and Stressor Related Disorder  Psychosocial / Contextual Factors: Patient's entire family of origin has , she now has a sister-in-law and  as support.  Relationship with  is conflictual. She is recovering from surgeries  PROMIS (reviewed every 90 days): PROMIS-10 Scores  PROMIS 10-Global Health (only subscores and total score):   PROMIS-10 Scores Only 2021 3/15/2022 3/15/2022 3/15/2022 3/24/2022 2022 2022   Global Mental Health Score 6 6 6 6 8 12 12   Global Physical Health Score 9 9 9 9 8 11 10  "  PROMIS TOTAL - SUBSCORES 15 15 15 15 16 23 22       Referral / Collaboration:  Referral to another professional/service is not indicated at this time..    Anticipated number of session for this episode of care: 50  Anticipation frequency of session: Biweekly  Anticipated Duration of each session: 53 or more minutes due to intensity of trauma symptoms  Treatment plan will be reviewed in 90 days or when goals have been changed.   There has been demonstrated improvement in functioning while patient has been engaged in psychotherapy/psychological service- if withdrawn the patient would deteriorate and/or relapse.       MeasurableTreatment Goal(s) related to diagnosis / functional impairment(s)  Goal 1: Patient will \"jumpstart, getting going with the things I need to be doing around the house as far as picking up, doing things, trying to do something every day.  Also to lessen the animosity between me and my .\"    I will know I've met my goal when my shoulders are fixed and I can see.      Objective #A (Patient Action)    Patient will  increase frequency of engaging her in ADLs .  Status: Continued - Date(s): 12/10/21, 3/9/22, 6/9/22, 9/2/22, 12/1/22, 3/2/23, 6/6/23, 9/13/23, 12/14/23    Intervention(s)  Therapist will  engage patient in CBT, specifically behavioral activation .    Objective #B  Patient will   track and record at least 5 pleasant exchanges with . Patient will be able to identify at least 5 positive traits about her  and how he relates to her .  Status: Continued - Date(s): 12/10/21, 3/9/22, 6/9/22, 9/2/22, 12/1/22, 3/2/23, 6/6/23, 9/13/23, 12/14/23    Intervention(s)  Therapist will  teach assertiveness skills and assign homework related to relationship interactions .    Objective #C  Patient will  reduce level of depressive and anxious features as evidenced by reduction in score on her CHAVO-7 and PHQ-9 (scores of 15 and 16 at first measurment, respectively) .  Status: Continued - " "Date(s): 12/10/21, 3/9/22, 6/9/22, 9/2/22, 12/1/22, 3/2/23, 6/6/23, 9/13/23, 12/14/23    Intervention(s)  Therapist will  engage patient in person-centered therapy and CBT .    Patient has reviewed and agreed to the above plan.      MICAELA SLADE, [facility]  December 14, 2023                                                   Randi Cleary          SAFETY PLAN:  Step 1: Warning signs / cues (Thoughts, images, mood, situation, behavior) that a crisis may be developing:  Thoughts: \"I don't want to continue\" \"I am unwanted\"  Images: none  Thinking Processes: ruminating  Mood: anger  Behaviors: isolating/withdrawing , can't stop crying, not taking care of myself and not taking care of my responsibilities  Situations: small triggers, such as not being able to find something, or dropping something   Step 2: Coping strategies - Things I can do to take my mind off of my problems without contacting another person (relaxation technique, physical activity):  Distress Tolerance Strategies:  arts and crafts: drawing, play with my pet , listen to positive and upbeat music: any, change body temperature (ice pack/cold water)  and paced breathing/progressive muscle relaxation  Physical Activities: go for a walk, deep breathing and stretching   Focus on helpful thoughts:  \"You've been through this before, you can get through it again.\"  Step 3: People and social settings that provide distraction:                 Name: Carmen                            Name: D[place]en                           Name: Aleida       pool, shopping, Carmen's house, [employer]       Step 4: Remind myself of people and things that are important to me and worth living for:  Clifford Little Donna, post-COVID world, options of what could be in your future        Step 5: When I am in crisis, I can ask these people to help me use my safety plan:                 Name: Sidney  Step 6: Making the environment safe:   go to sleep/daydream  Step 7: Professionals or agencies I " can contact during a crisis:  [facility] Daytime Number: [phone]  Suicide Prevention Lifeline: 7-854-859-UVZJ (0638)  Crisis Text Line Service (available 24 hours a day, 7 days a week): Text [place] to 136479  Local Crisis Services: [facility] Crisis: 763.921.3016  Adults can always access to the emPATH unit at [facility] (no phone number, utilize it like an urgent care or ER where you just show up)     Call 911 or go to my nearest emergency department.       I helped develop this safety plan and agree to use it when needed.  I have been given a copy of this plan.       Client signature _________________________________________________________________  Today s date:  11/24/2020  Adapted from Safety Plan Template 2008 Randi Poole and Robby Barba is reprinted with the express permission of the authors.  No portion of the Safety Plan Template may be reproduced without the express, written permission.  You can contact the authors at [email or web address] or [email or web address].

## 2024-01-19 ENCOUNTER — PATIENT OUTREACH (OUTPATIENT)
Dept: ONCOLOGY | Facility: CLINIC | Age: 69
End: 2024-01-19
Payer: MEDICARE

## 2024-01-19 DIAGNOSIS — E83.110 HEREDITARY HEMOCHROMATOSIS (H): Primary | ICD-10-CM

## 2024-01-19 NOTE — PROGRESS NOTES
[facility]: Cancer Care                                                                                          Pt sent my chart message stating that she is getting labs completed on Monday for her Tuesday appt with Dr Pablo.  Labs ordered.  My chart message sent to pt letting her know labs have been ordered.    Sowmya Hernandez, CORALN, RN  RN Care Coordinator  [facility]

## 2024-01-22 ENCOUNTER — LAB (OUTPATIENT)
Dept: LAB | Facility: CLINIC | Age: 69
End: 2024-01-22
Payer: MEDICARE

## 2024-01-22 ENCOUNTER — ANCILLARY PROCEDURE (OUTPATIENT)
Dept: BONE DENSITY | Facility: CLINIC | Age: 69
End: 2024-01-22
Payer: MEDICARE

## 2024-01-22 ENCOUNTER — VIRTUAL VISIT (OUTPATIENT)
Dept: PSYCHOLOGY | Facility: CLINIC | Age: 69
End: 2024-01-22
Payer: MEDICARE

## 2024-01-22 DIAGNOSIS — F43.9 TRAUMA AND STRESSOR-RELATED DISORDER: ICD-10-CM

## 2024-01-22 DIAGNOSIS — M19.071 OSTEOARTHRITIS OF RIGHT ANKLE AND FOOT: ICD-10-CM

## 2024-01-22 DIAGNOSIS — Z78.0 POST-MENOPAUSAL: ICD-10-CM

## 2024-01-22 DIAGNOSIS — E83.110 HEREDITARY HEMOCHROMATOSIS (H): ICD-10-CM

## 2024-01-22 DIAGNOSIS — F41.1 GENERALIZED ANXIETY DISORDER: ICD-10-CM

## 2024-01-22 DIAGNOSIS — R30.0 DYSURIA: ICD-10-CM

## 2024-01-22 DIAGNOSIS — E11.9 TYPE 2 DIABETES MELLITUS WITHOUT COMPLICATION, WITHOUT LONG-TERM CURRENT USE OF INSULIN (H): Primary | ICD-10-CM

## 2024-01-22 DIAGNOSIS — E89.0 POSTABLATIVE HYPOTHYROIDISM: ICD-10-CM

## 2024-01-22 DIAGNOSIS — Z92.241 HX OF CORTICOSTEROID THERAPY: ICD-10-CM

## 2024-01-22 DIAGNOSIS — F31.81 BIPOLAR 2 DISORDER (H): Primary | ICD-10-CM

## 2024-01-22 LAB
ALBUMIN SERPL BCG-MCNC: 4.3 G/DL (ref 3.5–5.2)
ALP SERPL-CCNC: 78 U/L (ref 40–150)
ALT SERPL W P-5'-P-CCNC: 19 U/L (ref 0–50)
ANION GAP SERPL CALCULATED.3IONS-SCNC: 10 MMOL/L (ref 7–15)
AST SERPL W P-5'-P-CCNC: 27 U/L (ref 0–45)
BASOPHILS # BLD AUTO: 0 10E3/UL (ref 0–0.2)
BASOPHILS NFR BLD AUTO: 0 %
BILIRUB SERPL-MCNC: 0.4 MG/DL
BUN SERPL-MCNC: 13.4 MG/DL (ref 8–23)
CALCIUM SERPL-MCNC: 9.7 MG/DL (ref 8.8–10.2)
CHLORIDE SERPL-SCNC: 104 MMOL/L (ref 98–107)
CREAT SERPL-MCNC: 0.59 MG/DL (ref 0.51–0.95)
CRP SERPL-MCNC: 3.6 MG/L
DEPRECATED HCO3 PLAS-SCNC: 28 MMOL/L (ref 22–29)
EGFRCR SERPLBLD CKD-EPI 2021: >90 ML/MIN/1.73M2
EOSINOPHIL # BLD AUTO: 0.1 10E3/UL (ref 0–0.7)
EOSINOPHIL NFR BLD AUTO: 2 %
ERYTHROCYTE [DISTWIDTH] IN BLOOD BY AUTOMATED COUNT: 12.5 % (ref 10–15)
FERRITIN SERPL-MCNC: 70 NG/ML (ref 11–328)
GLUCOSE SERPL-MCNC: 105 MG/DL (ref 70–99)
HBA1C MFR BLD: 5.6 % (ref 0–5.6)
HCT VFR BLD AUTO: 50.6 % (ref 35–47)
HGB BLD-MCNC: 17.2 G/DL (ref 11.7–15.7)
IMM GRANULOCYTES # BLD: 0 10E3/UL
IMM GRANULOCYTES NFR BLD: 0 %
IRON BINDING CAPACITY (ROCHE): 289 UG/DL (ref 240–430)
IRON SATN MFR SERPL: 59 % (ref 15–46)
IRON SERPL-MCNC: 171 UG/DL (ref 37–145)
LYMPHOCYTES # BLD AUTO: 1 10E3/UL (ref 0.8–5.3)
LYMPHOCYTES NFR BLD AUTO: 16 %
MCH RBC QN AUTO: 32.6 PG (ref 26.5–33)
MCHC RBC AUTO-ENTMCNC: 34 G/DL (ref 31.5–36.5)
MCV RBC AUTO: 96 FL (ref 78–100)
MONOCYTES # BLD AUTO: 0.4 10E3/UL (ref 0–1.3)
MONOCYTES NFR BLD AUTO: 7 %
NEUTROPHILS # BLD AUTO: 4.7 10E3/UL (ref 1.6–8.3)
NEUTROPHILS NFR BLD AUTO: 75 %
PLATELET # BLD AUTO: 205 10E3/UL (ref 150–450)
POTASSIUM SERPL-SCNC: 4.5 MMOL/L (ref 3.4–5.3)
PROT SERPL-MCNC: 7.3 G/DL (ref 6.4–8.3)
RBC # BLD AUTO: 5.28 10E6/UL (ref 3.8–5.2)
RETICS # AUTO: 0.11 10E6/UL (ref 0.01–0.11)
RETICS/RBC NFR AUTO: 2.1 % (ref 0.8–2.7)
SODIUM SERPL-SCNC: 142 MMOL/L (ref 135–145)
WBC # BLD AUTO: 6.3 10E3/UL (ref 4–11)

## 2024-01-22 PROCEDURE — 77080 DXA BONE DENSITY AXIAL: CPT | Mod: TC | Performed by: RADIOLOGY

## 2024-01-22 PROCEDURE — 83550 IRON BINDING TEST: CPT

## 2024-01-22 PROCEDURE — 85025 COMPLETE CBC W/AUTO DIFF WBC: CPT

## 2024-01-22 PROCEDURE — 82728 ASSAY OF FERRITIN: CPT

## 2024-01-22 PROCEDURE — 80053 COMPREHEN METABOLIC PANEL: CPT

## 2024-01-22 PROCEDURE — 90837 PSYTX W PT 60 MINUTES: CPT | Mod: 95 | Performed by: SOCIAL WORKER

## 2024-01-22 PROCEDURE — 84550 ASSAY OF BLOOD/URIC ACID: CPT

## 2024-01-22 PROCEDURE — 85045 AUTOMATED RETICULOCYTE COUNT: CPT

## 2024-01-22 PROCEDURE — 36415 COLL VENOUS BLD VENIPUNCTURE: CPT

## 2024-01-22 PROCEDURE — 83540 ASSAY OF IRON: CPT

## 2024-01-22 PROCEDURE — 84238 ASSAY NONENDOCRINE RECEPTOR: CPT | Mod: 90

## 2024-01-22 PROCEDURE — 77081 DXA BONE DENSITY APPENDICULR: CPT | Mod: TC | Performed by: RADIOLOGY

## 2024-01-22 PROCEDURE — 83036 HEMOGLOBIN GLYCOSYLATED A1C: CPT

## 2024-01-22 PROCEDURE — 99000 SPECIMEN HANDLING OFFICE-LAB: CPT

## 2024-01-22 PROCEDURE — 86140 C-REACTIVE PROTEIN: CPT

## 2024-01-22 NOTE — PROGRESS NOTES
[facility]                                     Progress Note    Patient Name: Randi Cleary  Date: 1/22/24         Service Type: Individual     Session Start Time: 12:04 PM Session End Time: 12:59 PM     Session Length: 55 minutes    Session #: 228    Attendees: Client attended alone    Service Modality:  Video Visit:      Provider verified identity through the following two step process.  Patient provided:  Patient is known previously to provider    Telemedicine Visit: The patient's condition can be safely assessed and treated via synchronous audio and visual telemedicine encounter.      Reason for Telemedicine Visit: Patient convenience (e.g. access to timely appointments / distance to available provider)    Originating Site (Patient Location): Patient's home    Distant Site (Provider Location): Provider Remote Setting- Home Office    Consent:  The patient/guardian has verbally consented to: the potential risks and benefits of telemedicine (video visit) versus in person care; bill my insurance or make self-payment for services provided; and responsibility for payment of non-covered services.     Patient would like the video invitation sent by:  My Chart    Mode of Communication:  Video Conference via [facility]    Distant Location (Provider):  Off-site    As the provider I attest to compliance with applicable laws and regulations related to telemedicine.       DATA  Extended Session (53+ minutes): PROLONGED SERVICE IN THE OUTPATIENT SETTING REQUIRING DIRECT (FACE-TO-FACE) PATIENT CONTACT BEYOND THE USUAL SERVICE:    - Treatment protocol required additional time to complete, due to the nature of diagnosis being treated.  See Interventions section for details  Interactive Complexity: No  Crisis: No        Progress Since Last Session (Related to Symptoms / Goals / Homework):   Symptoms:  Patient reported feeling overwhelmed lately.       Homework:  Progress made    Continue looking at  records  Look at taking one thing at the time  Continue to process past     Episode of Care Goals: Satisfactory progress - ACTION (Actively working towards change); Intervened by reinforcing change plan / affirming steps taken     Current / Ongoing Stressors and Concerns:   Patient is currently socially isolated. She has a conflictual relationship with her .  She is getting minimal physical activity.  She has had several surgeries.      Treatment Objective(s) Addressed in This Session:   Patient will increase frequency of engaging her in ADLs.  Patient will track and record at least 5 pleasant exchanges with . Patient will be able to identify at least 5 positive traits about her .  Patient will reduce level of depressive and anxious features as evidenced by reduction in score on her CHAVO-7 and PHQ-9 (scores of 15 and 16 at first measurment, respectively).     Intervention:   Supportive Therapy:   Processed ongoing concerns with mental health symptoms. Talked about steps she's taking to find support, including trying her medication and working with interventional psychiatry. Discussed concerns, including challenges in reduction of her marijuana usage for pain management. Talked about health concerns and how these impact her.      The following assessments were completed by patient for this visit:  PHQ9:       10/31/2023     4:16 AM 12/4/2023    12:58 AM 12/18/2023     3:01 PM 12/19/2023     3:50 PM 12/29/2023    12:48 PM 1/15/2024    11:57 AM 1/22/2024    11:59 AM   PHQ-9 SCORE   PHQ-9 Total Score MyChart 18 (Moderately severe depression) 15 (Moderately severe depression) 19 (Moderately severe depression)   17 (Moderately severe depression) 15 (Moderately severe depression)   PHQ-9 Total Score 18 15    15 19 16 20 17 15     GAD7:       9/5/2023     1:30 PM 9/20/2023     9:59 AM 10/9/2023     8:55 AM 10/31/2023     4:25 AM 12/1/2023    11:37 AM 12/18/2023     3:09 PM 1/15/2024    12:02 PM   CHAVO-7  SCORE   Total Score 15 (severe anxiety) 16 (severe anxiety) 15 (severe anxiety) 15 (severe anxiety) 14 (moderate anxiety) 16 (severe anxiety) 14 (moderate anxiety)   Total Score 15 16    16    16 15 15 14 16 14     PROMIS 10-Global Health (only subscores and total score):       7/21/2023    10:36 AM 7/28/2023     3:24 PM 8/7/2023     1:07 PM 9/20/2023    10:13 AM 10/31/2023     4:36 AM 12/1/2023    11:52 AM 1/22/2024    12:00 PM   PROMIS-10 Scores Only   Global Mental Health Score 5    5  6    6     5    5 6  5   Global Physical Health Score 7    7  8    8     9    9 8  9   PROMIS TOTAL - SUBSCORES 12    12  14    14     14    14 14  14       Information is confidential and restricted. Go to Review Flowsheets to unlock data.    Multiple values from one day are sorted in reverse-chronological order        ASSESSMENT: Current Emotional / Mental Status (status of significant symptoms):   Risk status (Self / Other harm or suicidal ideation)   Patient denies current fears or concerns for personal safety.   Patient denies current or recent suicidal ideation or behaviors.   Patient denies current or recent homicidal ideation or behaviors.   Patient denies current or recent self injurious behavior or ideation.   Patient denies other safety concerns.   Patient reports there has been no change in risk factors since their last session.     Patient reports there has been no change in protective factors since their last session.     A safety and risk management plan has been developed including: Patient consented to co-developed safety plan on 11/24/2020, updated 2/20/23.  Safety and risk management plan was reviewed.   Patient agreed to use safety plan should any safety concerns arise.  A copy was made available to the patient.     Appearance:   Appropriate    Eye Contact:   Good    Psychomotor Behavior: Normal    Attitude:   Cooperative    Orientation:   All   Speech    Rate / Production: Normal/ Responsive    Volume:  Normal     Mood:    Anxious    Affect:    Appropriate    Thought Content:  Clear    Thought Form:  Coherent    Insight:    Good      Medication Review:   No changes to current psychiatric medication(s)      Medication Compliance:   Yes     Changes in Health Issues:   None reported     Chemical Use Review:   Substance Use: Chemical use reviewed, no active concerns identified Nothing used since 2021.     Tobacco Use: No current tobacco use.      Diagnosis:  1. Bipolar 2 disorder (H)    2. Generalized anxiety disorder    3. Trauma and stressor-related disorder      Collateral Reports Completed:   Not Applicable    PLAN: (Patient Tasks / Therapist Tasks / Other)  Continue looking at records  Look at taking one thing at the time  Continue to process past        There has been demonstrated improvement in functioning while patient has been engaged in psychotherapy/psychological service- if withdrawn the patient would deteriorate and/or relapse.     MICAELA SLADE, [facility]   2024                                                        ______________________________________________________________________    Individual Treatment Plan    Patient's Name: Randi Cleary  YOB: 1955    Date of Creation: 20  Date Treatment Plan Last Reviewed/Revised: 23    DSM5 Diagnoses: 296.89 Bipolar II Disorder Depressed, 300.02 (F41.1) Generalized Anxiety Disorder, or Adjustment Disorders  309.89 (F43.8) Other Specified Trauma and Stressor Related Disorder  Psychosocial / Contextual Factors: Patient's entire family of origin has , she now has a sister-in-law and  as support.  Relationship with  is conflictual. She is recovering from surgeries  PROMIS (reviewed every 90 days): PROMIS-10 Scores  PROMIS 10-Global Health (only subscores and total score):   PROMIS-10 Scores Only 2021 3/15/2022 3/15/2022 3/15/2022 3/24/2022 2022 2022   Global Mental Health Score 6 6 6 6 8 12  "12   Global Physical Health Score 9 9 9 9 8 11 10   PROMIS TOTAL - SUBSCORES 15 15 15 15 16 23 22       Referral / Collaboration:  Referral to another professional/service is not indicated at this time..    Anticipated number of session for this episode of care: 50  Anticipation frequency of session: Biweekly  Anticipated Duration of each session: 53 or more minutes due to intensity of trauma symptoms  Treatment plan will be reviewed in 90 days or when goals have been changed.   There has been demonstrated improvement in functioning while patient has been engaged in psychotherapy/psychological service- if withdrawn the patient would deteriorate and/or relapse.       MeasurableTreatment Goal(s) related to diagnosis / functional impairment(s)  Goal 1: Patient will \"jumpstart, getting going with the things I need to be doing around the house as far as picking up, doing things, trying to do something every day.  Also to lessen the animosity between me and my .\"    I will know I've met my goal when my shoulders are fixed and I can see.      Objective #A (Patient Action)    Patient will  increase frequency of engaging her in ADLs .  Status: Continued - Date(s): 12/10/21, 3/9/22, 6/9/22, 9/2/22, 12/1/22, 3/2/23, 6/6/23, 9/13/23, 12/14/23    Intervention(s)  Therapist will  engage patient in CBT, specifically behavioral activation .    Objective #B  Patient will   track and record at least 5 pleasant exchanges with . Patient will be able to identify at least 5 positive traits about her  and how he relates to her .  Status: Continued - Date(s): 12/10/21, 3/9/22, 6/9/22, 9/2/22, 12/1/22, 3/2/23, 6/6/23, 9/13/23, 12/14/23    Intervention(s)  Therapist will  teach assertiveness skills and assign homework related to relationship interactions .    Objective #C  Patient will  reduce level of depressive and anxious features as evidenced by reduction in score on her CHAVO-7 and PHQ-9 (scores of 15 and 16 at first " "measurment, respectively) .  Status: Continued - Date(s): 12/10/21, 3/9/22, 6/9/22, 9/2/22, 12/1/22, 3/2/23, 6/6/23, 9/13/23, 12/14/23    Intervention(s)  Therapist will  engage patient in person-centered therapy and CBT .    Patient has reviewed and agreed to the above plan.      MICAELA SLADE, [facility]  December 14, 2023                                                   Randi Cleary          SAFETY PLAN:  Step 1: Warning signs / cues (Thoughts, images, mood, situation, behavior) that a crisis may be developing:  Thoughts: \"I don't want to continue\" \"I am unwanted\"  Images: none  Thinking Processes: ruminating  Mood: anger  Behaviors: isolating/withdrawing , can't stop crying, not taking care of myself and not taking care of my responsibilities  Situations: small triggers, such as not being able to find something, or dropping something   Step 2: Coping strategies - Things I can do to take my mind off of my problems without contacting another person (relaxation technique, physical activity):  Distress Tolerance Strategies:  arts and crafts: drawing, play with my pet , listen to positive and upbeat music: any, change body temperature (ice pack/cold water)  and paced breathing/progressive muscle relaxation  Physical Activities: go for a walk, deep breathing and stretching   Focus on helpful thoughts:  \"You've been through this before, you can get through it again.\"  Step 3: People and social settings that provide distraction:                 Name: Carmen                            Name: [place]                           Name: Aleida       pool, shopping, Carmen's house, [employer]       Step 4: Remind myself of people and things that are important to me and worth living for:  Sidney, Aleida Horton, post-COVID world, options of what could be in your future        Step 5: When I am in crisis, I can ask these people to help me use my safety plan:                 Name: Sidney  Step 6: Making the environment safe:   go to " sleep/daydream  Step 7: Professionals or agencies I can contact during a crisis:  [facility] Daytime Number: [phone]  Suicide Prevention Lifeline: 1-086-827-FAOH (9752)  Crisis Text Line Service (available 24 hours a day, 7 days a week): Text [place] to 848063  Local Crisis Services: [facility] Crisis: [phone]  Adults can always access to the emPATH unit at [facility] (no phone number, utilize it like an urgent care or ER where you just show up)     Call 911 or go to my nearest emergency department.       I helped develop this safety plan and agree to use it when needed.  I have been given a copy of this plan.       Client signature _________________________________________________________________  Today s date:  11/24/2020  Adapted from Safety Plan Template 2008 Randi Poole and Robby Barba is reprinted with the express permission of the authors.  No portion of the Safety Plan Template may be reproduced without the express, written permission.  You can contact the authors at bhs@[email or web address].[facility] [place] or [email or web address] [email or web address].

## 2024-01-23 ENCOUNTER — TELEPHONE (OUTPATIENT)
Dept: CARDIOLOGY | Facility: CLINIC | Age: 69
End: 2024-01-23
Payer: MEDICARE

## 2024-01-23 ENCOUNTER — VIRTUAL VISIT (OUTPATIENT)
Dept: ONCOLOGY | Facility: CLINIC | Age: 69
End: 2024-01-23
Attending: INTERNAL MEDICINE
Payer: MEDICARE

## 2024-01-23 VITALS — HEIGHT: 66 IN | WEIGHT: 208.7 LBS | BODY MASS INDEX: 33.54 KG/M2

## 2024-01-23 DIAGNOSIS — E83.110 HEREDITARY HEMOCHROMATOSIS (H): Primary | ICD-10-CM

## 2024-01-23 DIAGNOSIS — M19.071 OSTEOARTHRITIS OF RIGHT ANKLE AND FOOT: Primary | ICD-10-CM

## 2024-01-23 DIAGNOSIS — D75.1 POLYCYTHEMIA SECONDARY TO HYPOXIA: ICD-10-CM

## 2024-01-23 DIAGNOSIS — G47.33 OSA (OBSTRUCTIVE SLEEP APNEA): ICD-10-CM

## 2024-01-23 DIAGNOSIS — F31.81 BIPOLAR 2 DISORDER (H): ICD-10-CM

## 2024-01-23 DIAGNOSIS — E53.8 VITAMIN B12 DEFICIENCY: ICD-10-CM

## 2024-01-23 LAB
STFR SERPL-MCNC: 2.8 MG/L
URATE SERPL-MCNC: 4.3 MG/DL (ref 2.4–5.7)

## 2024-01-23 PROCEDURE — G2211 COMPLEX E/M VISIT ADD ON: HCPCS | Mod: 95 | Performed by: INTERNAL MEDICINE

## 2024-01-23 PROCEDURE — 99214 OFFICE O/P EST MOD 30 MIN: CPT | Mod: 95 | Performed by: INTERNAL MEDICINE

## 2024-01-23 ASSESSMENT — PAIN SCALES - GENERAL: PAINLEVEL: SEVERE PAIN (7)

## 2024-01-23 NOTE — LETTER
2024         RE: Randi Cleary  [address] [facility] [address]        Dear Colleague,    Thank you for referring your patient, Randi Cleary, to the [facility]. Please see a copy of my visit note below.    Virtual Visit Details    Type of service:  Video Visit   Video Start Time:  4:28 PM  Video End Time: 5:03 PM    Originating Location (pt. Location): Home    Distant Location (provider location):  On-site  Platform used for Video Visit: [facility] Hematology Progress Note    Outpatient Visit Note:    Patient: Randi Damon  MRN: [id number]  : 1955  YUAN: 2024    Reason for Consultation:  Two copies of the C282Y mutation - hereditary hemochromatosis    Assessment:  In summary, Randi Damon is a 67 year old woman with hereditary hemochromatosis and Past Medical History of KYAW, major depressive disorder, serotonin syndrome, pheochromocytoma,     1. Hereditary hemochromatosis  2. Restless leg syndrome  3. Major depressive disorder  4. Obstructive sleep apnea  5. Polycythemia, likely secondary to to #4    For hereditary hemochromatosis we typically try to remove excess iron by doing serial phlebotomy to reduce iron (ferritin) to goal of 50. However, in Ms. Damon's case, she has severe restless leg and fatigue therefore would permit for target ferritin of 125. Would NOT allow for ferritin to exceed 250 in this patient. Threshold for phlebotomy will be 150.     Ms. Damon has polycythemia. This is NOT related to HH, as iron status is regulated separate from this. This is likely secondary to her untreated KYAW, however if she were to develop leukocytosis or thrombocytosis would consider sending evaluation for MPN.  Currently her platelets and her leukocytes are normal.     Discussed with Ms. Damon the pathophysiology of iron overload disorders today. Given ferritin levels remain <1000 and prior  testing has not demonstrated end-organ damage do not suspect her joint issues are related.     Plan:  1. Majority of today's visit was spent counseling the patient regarding iron status, etc.  2. Labs every 2 months, .  3. Phlebotomy for ferritin >125    Labs in April with phlebotomy if ferritin is >125    Tova Pablo MD/PhD   of Medicine  Division of Hematology, Oncology and Transplantation    Link sent 4:35 PM   Sent link via [employer]-- collected at 4:37 PM  Resent video link- connected at 4:38 PM  Total time 29 min  Called ended 5:03 PM    ----------------------------------------------------------------------------------------------------------------------    Interval History:  Randi Damon is a 68 year old woman who is homozygous for Hereditary hemochromatosis C282Y mutation with complex medical history who presents for follow-up.    Winnie started on a new medication and she is feeling poorly.     She is Lamictal. Was on it before and noting rage but feeling agitated - different than anxiety. Also feels teary. Everything is feeling overwhelming. Does not like trials of medications as she is very nervous about them. Her long-term therapist also lost his license. All of this has left her feeling     Has appointment with interventional psych on 1/30.     She is working with a dietician at [employer] and has lost some weight. Is toying with dropping sugar and sugar substitutes in her diets.     Was diagnosed with gout. Was found to have uric acid of 6.7. Is currently on allopurinol. Has some tophi in her hands.     She working on sleep apnea treatment- is going to look for the machine. Sleep overall not good- did get new bed. Sleeping 4-5h       Past Medical History:  Past Medical History:   Diagnosis Date    Bipolar 2 disorder (H)     Breast cancer (H) 1986    lumpectomy, radiation, chemo    Chronic pain syndrome     COPD (chronic obstructive pulmonary disease) (H)     asthma    Cord  compression (H) 12/21/2021    Dizzy     Drug tolerance     opioid    Esophageal reflux     Fatigue     Generalized anxiety disorder     Graves disease 1994    Hemochromatosis 02/14/2018    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 Created by Conversion  Replacement Utility updated for latest IMO load Formatting of this note might be different from the original. Created by Conversion  Replacement Utility updated for latest IMO load    History of corticosteroid therapy 11/19/2019    History of partial adrenalectomy (H24) 11/19/2019    History of pheochromocytoma 11/19/2019    Hx antineoplastic chemotherapy     Hx of radiation therapy     Hyperlipidaemia     Hypertension     Impaired fasting glucose 2017    Injury of neck, whiplash 07/15/2021    Joint pain     KYAW (obstructive sleep apnea) 2016    Osteopenia     Pheochromocytoma, left 08/02/2017    laparoscopically removed    Postablative hypothyroidism 1995    Prediabetes 10/03/2019    by A1c    Psoriasis     Psoriatic arthropathy (H)     Right rotator cuff tear     RLS (restless legs syndrome)     on ropinorole    Sacroiliitis (H24)     Serotonin syndrome 08/28/2020    [facility] - While on desvenlafaxine 100mg    Snoring     Spinal stenosis     Status post coronary angiogram 10/03/2019    Urinary incontinence     Vitamin B 12 deficiency 2009    Vitamin D deficiency 2010     Past Surgical History:  Past Surgical History:   Procedure Laterality Date    ARTHRODESIS ANKLE      ARTHROPLASTY ANKLE Right 6/29/2015    Procedure: ARTHROPLASTY ANKLE;  Surgeon: Jason Coughlin MD;  Location: [facility]    ARTHROPLASTY REVISION ANKLE Right 6/29/2015    Procedure: ARTHROPLASTY REVISION ANKLE;  Surgeon: Jason Coughlin MD;  Location:  [facility]    BIOPSY BREAST      BREAST BIOPSY, CORE RT/LT      COLONOSCOPY      COLONOSCOPY N/A 2/25/2021    Procedure: COLONOSCOPY;  Surgeon:  Guru Elke Tolbert MD;  Location:  GI    CV CORONARY ANGIOGRAM N/A 10/3/2019    Procedure: CV CORONARY ANGIOGRAM;  Surgeon: Bryce Pierre MD;  Location:  HEART CARDIAC CATH LAB    CV RIGHT HEART CATH MEASUREMENTS RECORDED N/A 10/3/2019    Procedure: CV RIGHT HEART CATH;  Surgeon: Bryce Pierre MD;  Location:  HEART CARDIAC CATH LAB    ESOPHAGOSCOPY, GASTROSCOPY, DUODENOSCOPY (EGD), COMBINED N/A 2/25/2021    Procedure: ESOPHAGOGASTRODUODENOSCOPY, WITH BIOPSY;  Surgeon: Guru Elke Tolbert MD;  Location:  GI    EYE SURGERY  2021    HC REMOVE TONSILS/ADENOIDS,<11 Y/O      Description: Tonsillectomy With Adenoidectomy;  Recorded: 04/07/2010;    IR LUMBAR EPIDURAL STEROID INJECTION  10/26/2004    IR LUMBAR EPIDURAL STEROID INJECTION  11/16/2004    IR LUMBAR EPIDURAL STEROID INJECTION  12/21/2004    IR LUMBAR EPIDURAL STEROID INJECTION  6/8/2006    JOINT REPLACEMENT      LAMINOPLASTY CERVICAL POSTERIOR THREE+ LEVELS Left 12/21/2021    Procedure: CERVICAL 3-CERVICAL 6 LEFT OPEN DOOR LAMINOPLASTY AND LEFT CERVICAL 4-5 AND CERVICAL 6-7 POSTERIOR FORAMINOTOMY;  Surgeon: Angela Gregory MD;  Location: [facility]    LAPAROSCOPIC ADRENALECTOMY Left 08/02/2017    pheochromocytoma    LAPAROSCOPIC ADRENALECTOMY Left 8/2/2017    Procedure: LAPAROSCOPIC LEFT ADRENALECTOMY, ;  Surgeon: Gab Linares MD;  Location: [facility];  Service:     LENGTHEN TENDON ACHILLES Right 6/29/2015    Procedure: LENGTHEN TENDON ACHILLES;  Surgeon: Jason Coughlin MD;  Location: [facility]    LUMPECTOMY BREAST      LUMPECTOMY BREAST Left 1994    MAMMOPLASTY REDUCTION Right 01/13/2015    [place]    MAMMOPLASTY REDUCTION Right     approx late 2015/early2016    MASTECTOMY      left lumpectomy with axillary node dissection    MASTECTOMY MODIFIED RADICAL      OTHER SURGICAL HISTORY Right     reconstructive breast surgery    OTHER SURGICAL HISTORY      Adrenalectomy for  pheochromocytoma    VT MASTECTOMY, MODIFIED RADICAL      Description: Modified Radical Mastectomy Left Breast;  Recorded: 04/07/2010;    REPAIR HAMMER TOE Right 6/29/2015    Procedure: REPAIR HAMMER TOE;  Surgeon: Jason Coughlin MD;  Location: [facility]    TONSILLECTOMY      TONSILLECTOMY & ADENOIDECTOMY      [facility] ARTHRODESIS,ANKLE,OPEN Right     Description: Ankle Arthrodesis;  Recorded: 04/07/2010;       Medications:  Current Outpatient Medications   Medication Sig Dispense Refill    albuterol (VENTOLIN HFA) 108 (90 Base) MCG/ACT inhaler Inhale 2 puffs into the lungs every 6 hours as needed for shortness of breath, wheezing or cough      allopurinol (ZYLOPRIM) 300 MG tablet Take 1 tablet (300 mg) by mouth daily 90 tablet 3    benzonatate (TESSALON) 200 MG capsule Take 1 capsule (200 mg) by mouth 3 times daily as needed for cough 30 capsule 1    Cyanocobalamin (VITAMIN B-12) 5000 MCG SUBL Place 2-3 sprays under the tongue daily Unknown dose. 2 or 3 sprays/day      ethacrynic acid (EDECRIN) 25 MG tablet Half pill every day 45 tablet 3    Fluticasone-Umeclidin-Vilanterol (TRELEGY ELLIPTA) 200-62.5-25 MCG/ACT oral inhaler Inhale 1 puff into the lungs daily 1 each 11    guaiFENesin (MUCINEX) 600 MG 12 hr tablet Take 1,200 mg by mouth 2 times daily as needed for congestion      KLOR-CON 20 MEQ CR tablet Take 1 tablet (20 mEq) by mouth daily 90 tablet 3    lamoTRIgine (LAMICTAL) 25 MG tablet 25mg daily x 2 weeks, then 50mg daily x 2 weeks 42 tablet 0    MAGNESIUM CITRATE PO Take 235 mg by mouth at bedtime      medical cannabis (Patient's own supply) See Admin Instructions (The purpose of this order is to document that the patient reports taking medical cannabis.  This is not a prescription, and is not used to certify that the patient has a qualifying medical condition.)  Flower      naloxone (NARCAN) 4 MG/0.1ML nasal spray Spray 1 spray (4 mg) into one nostril alternating nostrils as needed for opioid reversal every 2-3  "minutes until assistance arrives 0.2 mL 0    omeprazole (PRILOSEC) 20 MG DR capsule Take 1 capsule (20 mg) by mouth daily 90 capsule 3    ondansetron (ZOFRAN ODT) 4 MG ODT tab DISSOLVE 1 TABLET UNDER THE TONGUE EVERY 6 HOURS AS NEEDED FOR NAUSEA*      oxyCODONE (ROXICODONE) 5 MG tablet Take 1 tablet (5 mg) by mouth 4 times daily Dispense/ 1/24 got 1/26 112 tablet 0    rOPINIRole (REQUIP) 2 MG tablet One tab 3 pm, one bedtime, and one during night on waking 90 tablet 3    STATIN NOT PRESCRIBED (INTENTIONAL) Please choose reason not prescribed from choices below.      SYNTHROID 150 MCG tablet MON to SAT 1 tablet/day; SUN 0.5 tablet 90 tablet 4    vitamin C (ASCORBIC ACID) 1000 MG TABS Take 1,000 mg by mouth daily      vitamin D3 (CHOLECALCIFEROL) 250 mcg (22628 units) capsule Take 1 capsule by mouth once a week          Allergies:  Allergies   Allergen Reactions    Serotonin Reuptake Inhibitors (Ssris) Anxiety, Difficulty breathing, Headache, Palpitations and Shortness Of Breath    Buspirone      The patient states she had serotonin syndrome    Cephalexin      Other reaction(s): unknown rxn.    Desvenlafaxine      Serotonin syndrome    Diclofenac Sodium [Diclofenac]      Serotonin syndrome and restless legs syndrome    Gabapentin      Drove on the wrong side of the highway    Levofloxacin      \"CAN'T REMEMBER\"    Penicillins      \"SORES IN MOUTH\"    Riluzole Difficulty breathing and Swelling    Sulfa Antibiotics      \"PT DOES NOT KNOW WHAT THE REACTION WAS\"    Topiramate Other (See Comments)     Frequent urination       ROS:  A 10 point ROS is negative except as stated in the HPI    Social History:  Denies any tobacco use. Denies any illicit drug use.     Family History:  Reviewed- no interval updates    Vitals: , Wt: 208 lbs 11.2 oz  GENERAL: alert and no distress  EYES: Eyes grossly normal to inspection.  No discharge or erythema, or obvious scleral/conjunctival abnormalities.  RESP: No audible wheeze, cough, or " visible cyanosis.    SKIN: Visible skin clear. No significant rash, abnormal pigmentation or lesions.  NEURO: Cranial nerves grossly intact.  Mentation and speech appropriate for age.  PSYCH: mentation appears normal, tearful, anxious, and appearance well groomed        Labs: personally reviewed with relevant trends annotated below  Ferritin   Date Value Ref Range Status   01/22/2024 70 11 - 328 ng/mL Final   09/25/2023 61 11 - 328 ng/mL Final   07/05/2023 134 11 - 328 ng/mL Final   05/01/2023 64 11 - 328 ng/mL Final   01/18/2023 75 11 - 328 ng/mL Final   11/04/2022 85 11 - 328 ng/mL Final   02/05/2021 48 8 - 252 ng/mL Final   10/30/2020 20 8 - 252 ng/mL Final   02/12/2020 19 8 - 252 ng/mL Final   09/24/2019 33 8 - 252 ng/mL Final   09/19/2019 36 8 - 252 ng/mL Final   09/13/2019 42 8 - 252 ng/mL Final     Iron   Date Value Ref Range Status   01/22/2024 171 (H) 37 - 145 ug/dL Final   07/25/2023 229 (H) 37 - 145 ug/dL Final   05/01/2023 85 37 - 145 ug/dL Final   01/18/2023 185 (H) 37 - 145 ug/dL Final   11/04/2022 201 (H) 37 - 145 ug/dL Final   07/01/2022 127 35 - 180 ug/dL Final   06/07/2022 216 (H) 35 - 180 ug/dL Final   02/23/2022 145 35 - 180 ug/dL Final   02/05/2021 141 35 - 180 ug/dL Final   10/30/2020 52 35 - 180 ug/dL Final   09/24/2019 92 35 - 180 ug/dL Final   08/13/2019 237 (H) 35 - 180 ug/dL Final     WBC   Date Value Ref Range Status   02/05/2021 6.9 4.0 - 11.0 10e9/L Final     WBC Count   Date Value Ref Range Status   01/22/2024 6.3 4.0 - 11.0 10e3/uL Final     Hemoglobin   Date Value Ref Range Status   01/22/2024 17.2 (H) 11.7 - 15.7 g/dL Final   10/17/2023 18.3 (H) 11.7 - 15.7 g/dL Final   09/25/2023 17.6 (H) 11.7 - 15.7 g/dL Final   07/25/2023 16.6 (H) 11.7 - 15.7 g/dL Final   05/01/2023 18.1 (H) 11.7 - 15.7 g/dL Final   01/18/2023 17.2 (H) 11.7 - 15.7 g/dL Final   02/05/2021 17.7 (H) 11.7 - 15.7 g/dL Final   10/30/2020 15.4 11.7 - 15.7 g/dL Final   03/09/2020 14.3 11.7 - 15.7 g/dL Final   02/12/2020  14.0 11.7 - 15.7 g/dL Final   10/03/2019 14.6 11.7 - 15.7 g/dL Final   09/24/2019 15.7 11.7 - 15.7 g/dL Final       Imaging:  SONIYA Pablo MD

## 2024-01-23 NOTE — NURSING NOTE
Is the patient currently in the state of [place]? YES    Visit mode:VIDEO    If the visit is dropped, the patient can be reconnected by: VIDEO VISIT: Send to e-mail at: [email or web address]    Will anyone else be joining the visit? NO  (If patient encounters technical issues they should call [phone])    How would you like to obtain your AVS? MyChart    Are changes needed to the allergy or medication list? Pt declined allergy review, Pt stated no changes to allergies, Pt declined med review, and Pt stated no med changes    Please review distress screening. Pt would like to get feedback on finding Primary Care Provider.     Reason for visit: RECHECK (Return [facility])    Maya KURTZ

## 2024-01-23 NOTE — PROGRESS NOTES
[facility] Hematology Progress Note    Outpatient Visit Note:    Patient: Randi Damon  MRN: [id number]  : 1955  YUAN: 2024    Reason for Consultation:  Two copies of the C282Y mutation - hereditary hemochromatosis    Assessment:  In summary, Randi Damon is a 67 year old woman with hereditary hemochromatosis and Past Medical History of KYAW, major depressive disorder, serotonin syndrome, pheochromocytoma,     1. Hereditary hemochromatosis  2. Restless leg syndrome  3. Major depressive disorder  4. Obstructive sleep apnea  5. Polycythemia, likely secondary to to #4    For hereditary hemochromatosis we typically try to remove excess iron by doing serial phlebotomy to reduce iron (ferritin) to goal of 50. However, in Ms. Damon's case, she has severe restless leg and fatigue therefore would permit for target ferritin of 125. Would NOT allow for ferritin to exceed 250 in this patient. Threshold for phlebotomy will be 150.     Ms. Damon has polycythemia. This is NOT related to HH, as iron status is regulated separate from this. This is likely secondary to her untreated KYAW, however if she were to develop leukocytosis or thrombocytosis would consider sending evaluation for MPN.  Currently her platelets and her leukocytes are normal.     Discussed with Ms. Damon the pathophysiology of iron overload disorders today. Given ferritin levels remain <1000 and prior testing has not demonstrated end-organ damage do not suspect her joint issues are related.     Plan:  1. Majority of today's visit was spent counseling the patient regarding iron status, etc.  2. Labs every 2 months, .  3. Phlebotomy for ferritin >125    Labs in April with phlebotomy if ferritin is >125    Tova Pablo MD/PhD   of Medicine  Division of Hematology, Oncology and Transplantation    Link sent 4:35 PM   Sent link via Planana-- collected at 4:37 PM  Resent video link- connected at  4:38 PM  Total time 29 min  Called ended 5:03 PM    ----------------------------------------------------------------------------------------------------------------------    Interval History:  Randi Damon is a 68 year old woman who is homozygous for Hereditary hemochromatosis C282Y mutation with complex medical history who presents for follow-up.    Winnie started on a new medication and she is feeling poorly.     She is Lamictal. Was on it before and noting rage but feeling agitated - different than anxiety. Also feels teary. Everything is feeling overwhelming. Does not like trials of medications as she is very nervous about them. Her long-term therapist also lost his license. All of this has left her feeling     Has appointment with interventional psych on 1/30.     She is working with a dietician at [employer] and has lost some weight. Is toying with dropping sugar and sugar substitutes in her diets.     Was diagnosed with gout. Was found to have uric acid of 6.7. Is currently on allopurinol. Has some tophi in her hands.     She working on sleep apnea treatment- is going to look for the machine. Sleep overall not good- did get new bed. Sleeping 4-5h       Past Medical History:  Past Medical History:   Diagnosis Date    Bipolar 2 disorder (H)     Breast cancer (H) 1986    lumpectomy, radiation, chemo    Chronic pain syndrome     COPD (chronic obstructive pulmonary disease) (H)     asthma    Cord compression (H) 12/21/2021    Dizzy     Drug tolerance     opioid    Esophageal reflux     Fatigue     Generalized anxiety disorder     Graves disease 1994    Hemochromatosis 02/14/2018    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 Created by Conversion  Replacement Utility updated for latest IMO load Formatting of this note might be different from the original. Created by Conversion  Replacement Utility updated for latest  IMO load    History of corticosteroid therapy 11/19/2019    History of partial adrenalectomy (H24) 11/19/2019    History of pheochromocytoma 11/19/2019    Hx antineoplastic chemotherapy     Hx of radiation therapy     Hyperlipidaemia     Hypertension     Impaired fasting glucose 2017    Injury of neck, whiplash 07/15/2021    Joint pain     KYAW (obstructive sleep apnea) 2016    Osteopenia     Pheochromocytoma, left 08/02/2017    laparoscopically removed    Postablative hypothyroidism 1995    Prediabetes 10/03/2019    by A1c    Psoriasis     Psoriatic arthropathy (H)     Right rotator cuff tear     RLS (restless legs syndrome)     on ropinorole    Sacroiliitis (H24)     Serotonin syndrome 08/28/2020    [facility] - While on desvenlafaxine 100mg    Snoring     Spinal stenosis     Status post coronary angiogram 10/03/2019    Urinary incontinence     Vitamin B 12 deficiency 2009    Vitamin D deficiency 2010     Past Surgical History:  Past Surgical History:   Procedure Laterality Date    ARTHRODESIS ANKLE      ARTHROPLASTY ANKLE Right 6/29/2015    Procedure: ARTHROPLASTY ANKLE;  Surgeon: Jason Coughlin MD;  Location: [facility]    ARTHROPLASTY REVISION ANKLE Right 6/29/2015    Procedure: ARTHROPLASTY REVISION ANKLE;  Surgeon: Jason Coughlin MD;  Location:  [facility]    BIOPSY BREAST      BREAST BIOPSY, CORE RT/LT      COLONOSCOPY      COLONOSCOPY N/A 2/25/2021    Procedure: COLONOSCOPY;  Surgeon: Guru Elke Tolbert MD;  Location:  GI    CV CORONARY ANGIOGRAM N/A 10/3/2019    Procedure: CV CORONARY ANGIOGRAM;  Surgeon: Bryce Pierre MD;  Location:  HEART CARDIAC CATH LAB    CV RIGHT HEART CATH MEASUREMENTS RECORDED N/A 10/3/2019    Procedure: CV RIGHT HEART CATH;  Surgeon: Bryce Pierre MD;  Location:  HEART CARDIAC CATH LAB    ESOPHAGOSCOPY, GASTROSCOPY, DUODENOSCOPY (EGD), COMBINED N/A 2/25/2021    Procedure: ESOPHAGOGASTRODUODENOSCOPY, WITH BIOPSY;  Surgeon:  Guru Elke Tolbert MD;  Location:  GI    EYE SURGERY  2021    HC REMOVE TONSILS/ADENOIDS,<13 Y/O      Description: Tonsillectomy With Adenoidectomy;  Recorded: 04/07/2010;    IR LUMBAR EPIDURAL STEROID INJECTION  10/26/2004    IR LUMBAR EPIDURAL STEROID INJECTION  11/16/2004    IR LUMBAR EPIDURAL STEROID INJECTION  12/21/2004    IR LUMBAR EPIDURAL STEROID INJECTION  6/8/2006    JOINT REPLACEMENT      LAMINOPLASTY CERVICAL POSTERIOR THREE+ LEVELS Left 12/21/2021    Procedure: CERVICAL 3-CERVICAL 6 LEFT OPEN DOOR LAMINOPLASTY AND LEFT CERVICAL 4-5 AND CERVICAL 6-7 POSTERIOR FORAMINOTOMY;  Surgeon: Angela Gregory MD;  Location: [facility]    LAPAROSCOPIC ADRENALECTOMY Left 08/02/2017    pheochromocytoma    LAPAROSCOPIC ADRENALECTOMY Left 8/2/2017    Procedure: LAPAROSCOPIC LEFT ADRENALECTOMY, ;  Surgeon: Gab Linares MD;  Location: [facility] OR;  Service:     LENGTHEN TENDON ACHILLES Right 6/29/2015    Procedure: LENGTHEN TENDON ACHILLES;  Surgeon: Jason Coughlin MD;  Location: [facility]    LUMPECTOMY BREAST      LUMPECTOMY BREAST Left 1994    MAMMOPLASTY REDUCTION Right 01/13/2015    [facility]    MAMMOPLASTY REDUCTION Right     approx late 2015/early2016    MASTECTOMY      left lumpectomy with axillary node dissection    MASTECTOMY MODIFIED RADICAL      OTHER SURGICAL HISTORY Right     reconstructive breast surgery    OTHER SURGICAL HISTORY      Adrenalectomy for pheochromocytoma    UT MASTECTOMY, MODIFIED RADICAL      Description: Modified Radical Mastectomy Left Breast;  Recorded: 04/07/2010;    REPAIR HAMMER TOE Right 6/29/2015    Procedure: REPAIR HAMMER TOE;  Surgeon: Jason Coughlin MD;  Location: [facility]    TONSILLECTOMY      TONSILLECTOMY & ADENOIDECTOMY      C ARTHRODESIS,ANKLE,OPEN Right     Description: Ankle Arthrodesis;  Recorded: 04/07/2010;       Medications:  Current Outpatient Medications   Medication Sig Dispense Refill    albuterol (VENTOLIN HFA) 108 (90  Base) MCG/ACT inhaler Inhale 2 puffs into the lungs every 6 hours as needed for shortness of breath, wheezing or cough      allopurinol (ZYLOPRIM) 300 MG tablet Take 1 tablet (300 mg) by mouth daily 90 tablet 3    benzonatate (TESSALON) 200 MG capsule Take 1 capsule (200 mg) by mouth 3 times daily as needed for cough 30 capsule 1    Cyanocobalamin (VITAMIN B-12) 5000 MCG SUBL Place 2-3 sprays under the tongue daily Unknown dose. 2 or 3 sprays/day      ethacrynic acid (EDECRIN) 25 MG tablet Half pill every day 45 tablet 3    Fluticasone-Umeclidin-Vilanterol (TRELEGY ELLIPTA) 200-62.5-25 MCG/ACT oral inhaler Inhale 1 puff into the lungs daily 1 each 11    guaiFENesin (MUCINEX) 600 MG 12 hr tablet Take 1,200 mg by mouth 2 times daily as needed for congestion      KLOR-CON 20 MEQ CR tablet Take 1 tablet (20 mEq) by mouth daily 90 tablet 3    lamoTRIgine (LAMICTAL) 25 MG tablet 25mg daily x 2 weeks, then 50mg daily x 2 weeks 42 tablet 0    MAGNESIUM CITRATE PO Take 235 mg by mouth at bedtime      medical cannabis (Patient's own supply) See Admin Instructions (The purpose of this order is to document that the patient reports taking medical cannabis.  This is not a prescription, and is not used to certify that the patient has a qualifying medical condition.)  Flower      naloxone (NARCAN) 4 MG/0.1ML nasal spray Spray 1 spray (4 mg) into one nostril alternating nostrils as needed for opioid reversal every 2-3 minutes until assistance arrives 0.2 mL 0    omeprazole (PRILOSEC) 20 MG DR capsule Take 1 capsule (20 mg) by mouth daily 90 capsule 3    ondansetron (ZOFRAN ODT) 4 MG ODT tab DISSOLVE 1 TABLET UNDER THE TONGUE EVERY 6 HOURS AS NEEDED FOR NAUSEA*      oxyCODONE (ROXICODONE) 5 MG tablet Take 1 tablet (5 mg) by mouth 4 times daily Dispense/ 1/24 got 1/26 112 tablet 0    rOPINIRole (REQUIP) 2 MG tablet One tab 3 pm, one bedtime, and one during night on waking 90 tablet 3    STATIN NOT PRESCRIBED (INTENTIONAL) Please  "choose reason not prescribed from choices below.      SYNTHROID 150 MCG tablet MON to SAT 1 tablet/day; SUN 0.5 tablet 90 tablet 4    vitamin C (ASCORBIC ACID) 1000 MG TABS Take 1,000 mg by mouth daily      vitamin D3 (CHOLECALCIFEROL) 250 mcg (08084 units) capsule Take 1 capsule by mouth once a week          Allergies:  Allergies   Allergen Reactions    Serotonin Reuptake Inhibitors (Ssris) Anxiety, Difficulty breathing, Headache, Palpitations and Shortness Of Breath    Buspirone      The patient states she had serotonin syndrome    Cephalexin      Other reaction(s): unknown rxn.    Desvenlafaxine      Serotonin syndrome    Diclofenac Sodium [Diclofenac]      Serotonin syndrome and restless legs syndrome    Gabapentin      Drove on the wrong side of the highway    Levofloxacin      \"CAN'T REMEMBER\"    Penicillins      \"SORES IN MOUTH\"    Riluzole Difficulty breathing and Swelling    Sulfa Antibiotics      \"PT DOES NOT KNOW WHAT THE REACTION WAS\"    Topiramate Other (See Comments)     Frequent urination       ROS:  A 10 point ROS is negative except as stated in the HPI    Social History:  Denies any tobacco use. Denies any illicit drug use.     Family History:  Reviewed- no interval updates    Vitals: , Wt: 208 lbs 11.2 oz  GENERAL: alert and no distress  EYES: Eyes grossly normal to inspection.  No discharge or erythema, or obvious scleral/conjunctival abnormalities.  RESP: No audible wheeze, cough, or visible cyanosis.    SKIN: Visible skin clear. No significant rash, abnormal pigmentation or lesions.  NEURO: Cranial nerves grossly intact.  Mentation and speech appropriate for age.  PSYCH: mentation appears normal, tearful, anxious, and appearance well groomed        Labs: personally reviewed with relevant trends annotated below  Ferritin   Date Value Ref Range Status   01/22/2024 70 11 - 328 ng/mL Final   09/25/2023 61 11 - 328 ng/mL Final   07/05/2023 134 11 - 328 ng/mL Final   05/01/2023 64 11 - 328 ng/mL Final "   01/18/2023 75 11 - 328 ng/mL Final   11/04/2022 85 11 - 328 ng/mL Final   02/05/2021 48 8 - 252 ng/mL Final   10/30/2020 20 8 - 252 ng/mL Final   02/12/2020 19 8 - 252 ng/mL Final   09/24/2019 33 8 - 252 ng/mL Final   09/19/2019 36 8 - 252 ng/mL Final   09/13/2019 42 8 - 252 ng/mL Final     Iron   Date Value Ref Range Status   01/22/2024 171 (H) 37 - 145 ug/dL Final   07/25/2023 229 (H) 37 - 145 ug/dL Final   05/01/2023 85 37 - 145 ug/dL Final   01/18/2023 185 (H) 37 - 145 ug/dL Final   11/04/2022 201 (H) 37 - 145 ug/dL Final   07/01/2022 127 35 - 180 ug/dL Final   06/07/2022 216 (H) 35 - 180 ug/dL Final   02/23/2022 145 35 - 180 ug/dL Final   02/05/2021 141 35 - 180 ug/dL Final   10/30/2020 52 35 - 180 ug/dL Final   09/24/2019 92 35 - 180 ug/dL Final   08/13/2019 237 (H) 35 - 180 ug/dL Final     WBC   Date Value Ref Range Status   02/05/2021 6.9 4.0 - 11.0 10e9/L Final     WBC Count   Date Value Ref Range Status   01/22/2024 6.3 4.0 - 11.0 10e3/uL Final     Hemoglobin   Date Value Ref Range Status   01/22/2024 17.2 (H) 11.7 - 15.7 g/dL Final   10/17/2023 18.3 (H) 11.7 - 15.7 g/dL Final   09/25/2023 17.6 (H) 11.7 - 15.7 g/dL Final   07/25/2023 16.6 (H) 11.7 - 15.7 g/dL Final   05/01/2023 18.1 (H) 11.7 - 15.7 g/dL Final   01/18/2023 17.2 (H) 11.7 - 15.7 g/dL Final   02/05/2021 17.7 (H) 11.7 - 15.7 g/dL Final   10/30/2020 15.4 11.7 - 15.7 g/dL Final   03/09/2020 14.3 11.7 - 15.7 g/dL Final   02/12/2020 14.0 11.7 - 15.7 g/dL Final   10/03/2019 14.6 11.7 - 15.7 g/dL Final   09/24/2019 15.7 11.7 - 15.7 g/dL Final       Imaging:  NA

## 2024-01-23 NOTE — PROGRESS NOTES
Virtual Visit Details    Type of service:  Video Visit   Video Start Time:  4:28 PM  Video End Time: 5:03 PM    Originating Location (pt. Location): Home    Distant Location (provider location):  On-site  Platform used for Video Visit: Maico

## 2024-01-25 ENCOUNTER — VIRTUAL VISIT (OUTPATIENT)
Dept: PSYCHOLOGY | Facility: CLINIC | Age: 69
End: 2024-01-25
Payer: MEDICARE

## 2024-01-25 DIAGNOSIS — F43.9 TRAUMA AND STRESSOR-RELATED DISORDER: ICD-10-CM

## 2024-01-25 DIAGNOSIS — F31.81 BIPOLAR 2 DISORDER (H): Primary | ICD-10-CM

## 2024-01-25 DIAGNOSIS — F41.1 GENERALIZED ANXIETY DISORDER: ICD-10-CM

## 2024-01-25 PROCEDURE — 90834 PSYTX W PT 45 MINUTES: CPT | Mod: 95 | Performed by: SOCIAL WORKER

## 2024-01-25 NOTE — PROGRESS NOTES
[facility]                                     Progress Note    Patient Name: Randi Cleary  Date: 1/25/24         Service Type: Individual     Session Start Time: 9:34 AM Session End Time: 10:23 AM     Session Length: 49 minutes    Session #: 229    Attendees: Client attended alone    Service Modality:  Video Visit:      Provider verified identity through the following two step process.  Patient provided:  Patient is known previously to provider    Telemedicine Visit: The patient's condition can be safely assessed and treated via synchronous audio and visual telemedicine encounter.      Reason for Telemedicine Visit: Patient convenience (e.g. access to timely appointments / distance to available provider)    Originating Site (Patient Location): Patient's home    Distant Site (Provider Location): Provider Remote Setting- Home Office    Consent:  The patient/guardian has verbally consented to: the potential risks and benefits of telemedicine (video visit) versus in person care; bill my insurance or make self-payment for services provided; and responsibility for payment of non-covered services.     Patient would like the video invitation sent by:  My Chart    Mode of Communication:  Video Conference via [facility]    Distant Location (Provider):  Off-site    As the provider I attest to compliance with applicable laws and regulations related to telemedicine.       DATA  Extended Session (53+ minutes): No  Interactive Complexity: No  Crisis: No        Progress Since Last Session (Related to Symptoms / Goals / Homework):   Symptoms:  Patient reports feeling overwhelmed with health lately      Homework:  Progress made    Continue looking at records  Look at taking one thing at the time  Continue to process past     Episode of Care Goals: Satisfactory progress - ACTION (Actively working towards change); Intervened by reinforcing change plan / affirming steps taken     Current / Ongoing Stressors and  Concerns:   Patient is currently socially isolated. She has a conflictual relationship with her .  She is getting minimal physical activity.  She has had several surgeries.      Treatment Objective(s) Addressed in This Session:   Patient will increase frequency of engaging her in ADLs.  Patient will track and record at least 5 pleasant exchanges with . Patient will be able to identify at least 5 positive traits about her .  Patient will reduce level of depressive and anxious features as evidenced by reduction in score on her CHAVO-7 and PHQ-9 (scores of 15 and 16 at first measurment, respectively).     Intervention:   Supportive Therapy:   Processed health situations. Also processed some situations with friendships and feelings around being left out. Talked about situation with friends and how this was impacting her and what she may want to do.      The following assessments were completed by patient for this visit:  PHQ9:       10/31/2023     4:16 AM 12/4/2023    12:58 AM 12/18/2023     3:01 PM 12/19/2023     3:50 PM 12/29/2023    12:48 PM 1/15/2024    11:57 AM 1/22/2024    11:59 AM   PHQ-9 SCORE   PHQ-9 Total Score MyChart 18 (Moderately severe depression) 15 (Moderately severe depression) 19 (Moderately severe depression)   17 (Moderately severe depression) 15 (Moderately severe depression)   PHQ-9 Total Score 18 15    15 19 16 20 17 15     GAD7:       9/5/2023     1:30 PM 9/20/2023     9:59 AM 10/9/2023     8:55 AM 10/31/2023     4:25 AM 12/1/2023    11:37 AM 12/18/2023     3:09 PM 1/15/2024    12:02 PM   CHAVO-7 SCORE   Total Score 15 (severe anxiety) 16 (severe anxiety) 15 (severe anxiety) 15 (severe anxiety) 14 (moderate anxiety) 16 (severe anxiety) 14 (moderate anxiety)   Total Score 15 16    16    16 15 15 14 16 14     PROMIS 10-Global Health (only subscores and total score):       7/21/2023    10:36 AM 7/28/2023     3:24 PM 8/7/2023     1:07 PM 9/20/2023    10:13 AM 10/31/2023     4:36 AM  12/1/2023    11:52 AM 1/22/2024    12:00 PM   PROMIS-10 Scores Only   Global Mental Health Score 5    5  6    6     5    5 6  5   Global Physical Health Score 7    7  8    8     9    9 8  9   PROMIS TOTAL - SUBSCORES 12    12  14    14     14    14 14  14       Information is confidential and restricted. Go to Review Flowsheets to unlock data.    Multiple values from one day are sorted in reverse-chronological order        ASSESSMENT: Current Emotional / Mental Status (status of significant symptoms):   Risk status (Self / Other harm or suicidal ideation)   Patient denies current fears or concerns for personal safety.   Patient denies current or recent suicidal ideation or behaviors.   Patient denies current or recent homicidal ideation or behaviors.   Patient denies current or recent self injurious behavior or ideation.   Patient denies other safety concerns.   Patient reports there has been no change in risk factors since their last session.     Patient reports there has been no change in protective factors since their last session.     A safety and risk management plan has been developed including: Patient consented to co-developed safety plan on 11/24/2020, updated 2/20/23.  Safety and risk management plan was reviewed.   Patient agreed to use safety plan should any safety concerns arise.  A copy was made available to the patient.     Appearance:   Appropriate    Eye Contact:   Good    Psychomotor Behavior: Normal    Attitude:   Cooperative    Orientation:   All   Speech    Rate / Production: Normal/ Responsive    Volume:  Normal    Mood:    Anxious    Affect:    Appropriate    Thought Content:  Clear    Thought Form:  Coherent    Insight:    Good      Medication Review:   No changes to current psychiatric medication(s)      Medication Compliance:   Yes     Changes in Health Issues:   None reported     Chemical Use Review:   Substance Use: Chemical use reviewed, no active concerns identified Nothing used since  2021.     Tobacco Use: No current tobacco use.      Diagnosis:  1. Bipolar 2 disorder (H)    2. Generalized anxiety disorder    3. Trauma and stressor-related disorder        Collateral Reports Completed:   Not Applicable    PLAN: (Patient Tasks / Therapist Tasks / Other)  Continue looking at records  Look at taking one thing at the time  Continue to process past        There has been demonstrated improvement in functioning while patient has been engaged in psychotherapy/psychological service- if withdrawn the patient would deteriorate and/or relapse.     MICAELA SLADE, [facility]   2024                                                        ______________________________________________________________________    Individual Treatment Plan    Patient's Name: Randi Cleary  YOB: 1955    Date of Creation: 20  Date Treatment Plan Last Reviewed/Revised: 23    DSM5 Diagnoses: 296.89 Bipolar II Disorder Depressed, 300.02 (F41.1) Generalized Anxiety Disorder, or Adjustment Disorders  309.89 (F43.8) Other Specified Trauma and Stressor Related Disorder  Psychosocial / Contextual Factors: Patient's entire family of origin has , she now has a sister-in-law and  as support.  Relationship with  is conflictual. She is recovering from surgeries  PROMIS (reviewed every 90 days): PROMIS-10 Scores  PROMIS 10-Global Health (only subscores and total score):   PROMIS-10 Scores Only 2021 3/15/2022 3/15/2022 3/15/2022 3/24/2022 2022 2022   Global Mental Health Score 6 6 6 6 8 12 12   Global Physical Health Score 9 9 9 9 8 11 10   PROMIS TOTAL - SUBSCORES 15 15 15 15 16 23 22       Referral / Collaboration:  Referral to another professional/service is not indicated at this time..    Anticipated number of session for this episode of care: 50  Anticipation frequency of session: Biweekly  Anticipated Duration of each session: 53 or more minutes due to intensity  "of trauma symptoms  Treatment plan will be reviewed in 90 days or when goals have been changed.   There has been demonstrated improvement in functioning while patient has been engaged in psychotherapy/psychological service- if withdrawn the patient would deteriorate and/or relapse.       MeasurableTreatment Goal(s) related to diagnosis / functional impairment(s)  Goal 1: Patient will \"jumpstart, getting going with the things I need to be doing around the house as far as picking up, doing things, trying to do something every day.  Also to lessen the animosity between me and my .\"    I will know I've met my goal when my shoulders are fixed and I can see.      Objective #A (Patient Action)    Patient will  increase frequency of engaging her in ADLs .  Status: Continued - Date(s): 12/10/21, 3/9/22, 6/9/22, 9/2/22, 12/1/22, 3/2/23, 6/6/23, 9/13/23, 12/14/23    Intervention(s)  Therapist will  engage patient in CBT, specifically behavioral activation .    Objective #B  Patient will   track and record at least 5 pleasant exchanges with . Patient will be able to identify at least 5 positive traits about her  and how he relates to her .  Status: Continued - Date(s): 12/10/21, 3/9/22, 6/9/22, 9/2/22, 12/1/22, 3/2/23, 6/6/23, 9/13/23, 12/14/23    Intervention(s)  Therapist will  teach assertiveness skills and assign homework related to relationship interactions .    Objective #C  Patient will  reduce level of depressive and anxious features as evidenced by reduction in score on her CHAVO-7 and PHQ-9 (scores of 15 and 16 at first measurment, respectively) .  Status: Continued - Date(s): 12/10/21, 3/9/22, 6/9/22, 9/2/22, 12/1/22, 3/2/23, 6/6/23, 9/13/23, 12/14/23    Intervention(s)  Therapist will  engage patient in person-centered therapy and CBT .    Patient has reviewed and agreed to the above plan.      MICAELA SLADE, [facility]  December 14, 2023                                                   Randi RIVERA" "Cathy Cleary          SAFETY PLAN:  Step 1: Warning signs / cues (Thoughts, images, mood, situation, behavior) that a crisis may be developing:  Thoughts: \"I don't want to continue\" \"I am unwanted\"  Images: none  Thinking Processes: ruminating  Mood: anger  Behaviors: isolating/withdrawing , can't stop crying, not taking care of myself and not taking care of my responsibilities  Situations: small triggers, such as not being able to find something, or dropping something   Step 2: Coping strategies - Things I can do to take my mind off of my problems without contacting another person (relaxation technique, physical activity):  Distress Tolerance Strategies:  arts and crafts: drawing, play with my pet , listen to positive and upbeat music: any, change body temperature (ice pack/cold water)  and paced breathing/progressive muscle relaxation  Physical Activities: go for a walk, deep breathing and stretching   Focus on helpful thoughts:  \"You've been through this before, you can get through it again.\"  Step 3: People and social settings that provide distraction:                 Name: Carmen                            Name: [place]                           Name: Aleida       pool, shopping, Carmen's house, [employer]       Step 4: Remind myself of people and things that are important to me and worth living for:  Sidney, Aleida Horton, post-COVID world, options of what could be in your future        Step 5: When I am in crisis, I can ask these people to help me use my safety plan:                 Name: Sidney  Step 6: Making the environment safe:   go to sleep/daydream  Step 7: Professionals or agencies I can contact during a crisis:  [facility] Daytime Number: [phone]  Suicide Prevention Lifeline: 4-286-814-TALK (2391)  Crisis Text Line Service (available 24 hours a day, 7 days a week): Text [place] to 643312  Local Crisis Services: [facility] Crisis: [phone]  Adults can always access to the emPATH unit at [facility] (no phone number, utilize it like an urgent care or ER where you just show up)     Call 911 or go to my nearest emergency department.       I helped develop this safety plan and agree to use it when needed.  I have been given a copy of this plan.       Client signature _________________________________________________________________  Today s date:  11/24/2020  Adapted from Safety Plan Template 2008 Randi Poole and Robby Barba is reprinted with the express permission of the authors.  No portion of the Safety Plan Template may be reproduced without the express, written permission.  You can contact the authors at [email or web address] or [email or web address].
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
## 2024-01-29 ENCOUNTER — VIRTUAL VISIT (OUTPATIENT)
Dept: PSYCHIATRY | Facility: CLINIC | Age: 69
End: 2024-01-29
Payer: MEDICARE

## 2024-01-29 ENCOUNTER — VIRTUAL VISIT (OUTPATIENT)
Dept: PSYCHOLOGY | Facility: CLINIC | Age: 69
End: 2024-01-29
Payer: MEDICARE

## 2024-01-29 DIAGNOSIS — F41.1 GAD (GENERALIZED ANXIETY DISORDER): ICD-10-CM

## 2024-01-29 DIAGNOSIS — F31.81 BIPOLAR 2 DISORDER (H): ICD-10-CM

## 2024-01-29 DIAGNOSIS — F31.81 BIPOLAR 2 DISORDER (H): Primary | ICD-10-CM

## 2024-01-29 DIAGNOSIS — F41.1 GENERALIZED ANXIETY DISORDER: ICD-10-CM

## 2024-01-29 DIAGNOSIS — F43.9 TRAUMA AND STRESSOR-RELATED DISORDER: Primary | ICD-10-CM

## 2024-01-29 DIAGNOSIS — F43.9 TRAUMA AND STRESSOR-RELATED DISORDER: ICD-10-CM

## 2024-01-29 PROCEDURE — 99214 OFFICE O/P EST MOD 30 MIN: CPT | Mod: 95

## 2024-01-29 PROCEDURE — 90834 PSYTX W PT 45 MINUTES: CPT | Mod: 95 | Performed by: SOCIAL WORKER

## 2024-01-29 PROCEDURE — 90833 PSYTX W PT W E/M 30 MIN: CPT | Mod: 95

## 2024-01-29 RX ORDER — LAMOTRIGINE 25 MG/1
TABLET ORAL
Qty: 90 TABLET | Refills: 0 | Status: SHIPPED | OUTPATIENT
Start: 2024-01-29 | End: 2024-02-26 | Stop reason: SINTOL

## 2024-01-29 ASSESSMENT — ANXIETY QUESTIONNAIRES
3. WORRYING TOO MUCH ABOUT DIFFERENT THINGS: MORE THAN HALF THE DAYS
4. TROUBLE RELAXING: MORE THAN HALF THE DAYS
6. BECOMING EASILY ANNOYED OR IRRITABLE: NEARLY EVERY DAY
7. FEELING AFRAID AS IF SOMETHING AWFUL MIGHT HAPPEN: MORE THAN HALF THE DAYS
7. FEELING AFRAID AS IF SOMETHING AWFUL MIGHT HAPPEN: MORE THAN HALF THE DAYS
GAD7 TOTAL SCORE: 16
5. BEING SO RESTLESS THAT IT IS HARD TO SIT STILL: SEVERAL DAYS
GAD7 TOTAL SCORE: 16
IF YOU CHECKED OFF ANY PROBLEMS ON THIS QUESTIONNAIRE, HOW DIFFICULT HAVE THESE PROBLEMS MADE IT FOR YOU TO DO YOUR WORK, TAKE CARE OF THINGS AT HOME, OR GET ALONG WITH OTHER PEOPLE: EXTREMELY DIFFICULT
8. IF YOU CHECKED OFF ANY PROBLEMS, HOW DIFFICULT HAVE THESE MADE IT FOR YOU TO DO YOUR WORK, TAKE CARE OF THINGS AT HOME, OR GET ALONG WITH OTHER PEOPLE?: EXTREMELY DIFFICULT
2. NOT BEING ABLE TO STOP OR CONTROL WORRYING: NEARLY EVERY DAY
1. FEELING NERVOUS, ANXIOUS, OR ON EDGE: NEARLY EVERY DAY
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
## 2024-01-29 NOTE — PROGRESS NOTES
[facility]                                     Progress Note    Patient Name: Randi Cleary  Date: 1/29/24         Service Type: Individual     Session Start Time: 12:06 PM Session End Time: 12:50 PM     Session Length: 44 minutes    Session #: 230    Attendees: Client attended alone    Service Modality:  Video Visit:      Provider verified identity through the following two step process.  Patient provided:  Patient is known previously to provider    Telemedicine Visit: The patient's condition can be safely assessed and treated via synchronous audio and visual telemedicine encounter.      Reason for Telemedicine Visit: Patient convenience (e.g. access to timely appointments / distance to available provider)    Originating Site (Patient Location): Patient's home    Distant Site (Provider Location): Provider Remote Setting- Home Office    Consent:  The patient/guardian has verbally consented to: the potential risks and benefits of telemedicine (video visit) versus in person care; bill my insurance or make self-payment for services provided; and responsibility for payment of non-covered services.     Patient would like the video invitation sent by:  My Chart    Mode of Communication:  [facility]    Distant Location (Provider):  Off-site    As the provider I attest to compliance with applicable laws and regulations related to telemedicine.       DATA  Extended Session (53+ minutes): No  Interactive Complexity: No  Crisis: No        Progress Since Last Session (Related to Symptoms / Goals / Homework):   Symptoms:  Patient reports feeling like she is managing well      Homework:  Progress made    Continue looking at records  Look at taking one thing at the time  Continue to process past     Episode of Care Goals: Satisfactory progress - ACTION (Actively working towards change); Intervened by reinforcing change plan / affirming steps taken     Current / Ongoing Stressors and  Concerns:   Patient is currently socially isolated. She has a conflictual relationship with her .  She is getting minimal physical activity.  She has had several surgeries.      Treatment Objective(s) Addressed in This Session:   Patient will increase frequency of engaging her in ADLs.  Patient will track and record at least 5 pleasant exchanges with . Patient will be able to identify at least 5 positive traits about her .  Patient will reduce level of depressive and anxious features as evidenced by reduction in score on her CHAVO-7 and PHQ-9 (scores of 15 and 16 at first measurment, respectively).     Intervention:   Supportive Therapy:   Processed rejection patient is feeling from the weekend. Processed medical appointments and next steps. Talked about upcoming psychiatry appointment. Reviewed coping and social interactions.      The following assessments were completed by patient for this visit:  PHQ9:       12/4/2023    12:58 AM 12/18/2023     3:01 PM 12/19/2023     3:50 PM 12/29/2023    12:48 PM 1/15/2024    11:57 AM 1/22/2024    11:59 AM 1/28/2024    10:59 PM   PHQ-9 SCORE   PHQ-9 Total Score MyChart 15 (Moderately severe depression) 19 (Moderately severe depression)   17 (Moderately severe depression) 15 (Moderately severe depression) 19 (Moderately severe depression)   PHQ-9 Total Score 15    15 19 16 20 17 15 19     GAD7:       9/20/2023     9:59 AM 10/9/2023     8:55 AM 10/31/2023     4:25 AM 12/1/2023    11:37 AM 12/18/2023     3:09 PM 1/15/2024    12:02 PM 1/29/2024     3:10 AM   CHAVO-7 SCORE   Total Score 16 (severe anxiety) 15 (severe anxiety) 15 (severe anxiety) 14 (moderate anxiety) 16 (severe anxiety) 14 (moderate anxiety) 16 (severe anxiety)   Total Score 16    16    16 15 15 14 16 14 16     PROMIS 10-Global Health (only subscores and total score):       7/21/2023    10:36 AM 7/28/2023     3:24 PM 8/7/2023     1:07 PM 9/20/2023    10:13 AM 10/31/2023     4:36 AM 12/1/2023    11:52  AM 1/22/2024    12:00 PM   PROMIS-10 Scores Only   Global Mental Health Score 5    5  6    6     5    5 6  5   Global Physical Health Score 7    7  8    8     9    9 8  9   PROMIS TOTAL - SUBSCORES 12    12  14    14     14    14 14  14       Information is confidential and restricted. Go to Review Flowsheets to unlock data.    Multiple values from one day are sorted in reverse-chronological order        ASSESSMENT: Current Emotional / Mental Status (status of significant symptoms):   Risk status (Self / Other harm or suicidal ideation)   Patient denies current fears or concerns for personal safety.   Patient denies current or recent suicidal ideation or behaviors.   Patient denies current or recent homicidal ideation or behaviors.   Patient denies current or recent self injurious behavior or ideation.   Patient denies other safety concerns.   Patient reports there has been no change in risk factors since their last session.     Patient reports there has been no change in protective factors since their last session.     A safety and risk management plan has been developed including: Patient consented to co-developed safety plan on 11/24/2020, updated 2/20/23.  Safety and risk management plan was reviewed.   Patient agreed to use safety plan should any safety concerns arise.  A copy was made available to the patient.     Appearance:   Appropriate    Eye Contact:   Good    Psychomotor Behavior: Normal    Attitude:   Cooperative    Orientation:   All   Speech    Rate / Production: Normal/ Responsive    Volume:  Normal    Mood:    Anxious    Affect:    Appropriate    Thought Content:  Clear    Thought Form:  Coherent    Insight:    Good      Medication Review:   No changes to current psychiatric medication(s)      Medication Compliance:   Yes     Changes in Health Issues:   None reported     Chemical Use Review:   Substance Use: Chemical use reviewed, no active concerns identified Nothing used since August  .     Tobacco Use: No current tobacco use.      Diagnosis:  1. Bipolar 2 disorder (H)    2. Generalized anxiety disorder    3. Trauma and stressor-related disorder        Collateral Reports Completed:   Not Applicable    PLAN: (Patient Tasks / Therapist Tasks / Other)  Continue looking at records  Look at taking one thing at the time  Continue to process past        There has been demonstrated improvement in functioning while patient has been engaged in psychotherapy/psychological service- if withdrawn the patient would deteriorate and/or relapse.     MICAEAL SLADE, [facility]   2024                                                        ______________________________________________________________________    Individual Treatment Plan    Patient's Name: Randi Cleary  YOB: 1955    Date of Creation: 20  Date Treatment Plan Last Reviewed/Revised: 23    DSM5 Diagnoses: 296.89 Bipolar II Disorder Depressed, 300.02 (F41.1) Generalized Anxiety Disorder, or Adjustment Disorders  309.89 (F43.8) Other Specified Trauma and Stressor Related Disorder  Psychosocial / Contextual Factors: Patient's entire family of origin has , she now has a sister-in-law and  as support.  Relationship with  is conflictual. She is recovering from surgeries  PROMIS (reviewed every 90 days): PROMIS-10 Scores  PROMIS 10-Global Health (only subscores and total score):   PROMIS-10 Scores Only 2021 3/15/2022 3/15/2022 3/15/2022 3/24/2022 2022 2022   Global Mental Health Score 6 6 6 6 8 12 12   Global Physical Health Score 9 9 9 9 8 11 10   PROMIS TOTAL - SUBSCORES 15 15 15 15 16 23 22       Referral / Collaboration:  Referral to another professional/service is not indicated at this time..    Anticipated number of session for this episode of care: 50  Anticipation frequency of session: Biweekly  Anticipated Duration of each session: 53 or more minutes due to intensity of  "trauma symptoms  Treatment plan will be reviewed in 90 days or when goals have been changed.   There has been demonstrated improvement in functioning while patient has been engaged in psychotherapy/psychological service- if withdrawn the patient would deteriorate and/or relapse.       MeasurableTreatment Goal(s) related to diagnosis / functional impairment(s)  Goal 1: Patient will \"jumpstart, getting going with the things I need to be doing around the house as far as picking up, doing things, trying to do something every day.  Also to lessen the animosity between me and my .\"    I will know I've met my goal when my shoulders are fixed and I can see.      Objective #A (Patient Action)    Patient will  increase frequency of engaging her in ADLs .  Status: Continued - Date(s): 12/10/21, 3/9/22, 6/9/22, 9/2/22, 12/1/22, 3/2/23, 6/6/23, 9/13/23, 12/14/23    Intervention(s)  Therapist will  engage patient in CBT, specifically behavioral activation .    Objective #B  Patient will   track and record at least 5 pleasant exchanges with . Patient will be able to identify at least 5 positive traits about her  and how he relates to her .  Status: Continued - Date(s): 12/10/21, 3/9/22, 6/9/22, 9/2/22, 12/1/22, 3/2/23, 6/6/23, 9/13/23, 12/14/23    Intervention(s)  Therapist will  teach assertiveness skills and assign homework related to relationship interactions .    Objective #C  Patient will  reduce level of depressive and anxious features as evidenced by reduction in score on her CHAVO-7 and PHQ-9 (scores of 15 and 16 at first measurment, respectively) .  Status: Continued - Date(s): 12/10/21, 3/9/22, 6/9/22, 9/2/22, 12/1/22, 3/2/23, 6/6/23, 9/13/23, 12/14/23    Intervention(s)  Therapist will  engage patient in person-centered therapy and CBT .    Patient has reviewed and agreed to the above plan.      MICAELA SLADE, [facility]  December 14, 2023                                                   Randi Morgan " "Evin          SAFETY PLAN:  Step 1: Warning signs / cues (Thoughts, images, mood, situation, behavior) that a crisis may be developing:  Thoughts: \"I don't want to continue\" \"I am unwanted\"  Images: none  Thinking Processes: ruminating  Mood: anger  Behaviors: isolating/withdrawing , can't stop crying, not taking care of myself and not taking care of my responsibilities  Situations: small triggers, such as not being able to find something, or dropping something   Step 2: Coping strategies - Things I can do to take my mind off of my problems without contacting another person (relaxation technique, physical activity):  Distress Tolerance Strategies:  arts and crafts: drawing, play with my pet , listen to positive and upbeat music: any, change body temperature (ice pack/cold water)  and paced breathing/progressive muscle relaxation  Physical Activities: go for a walk, deep breathing and stretching   Focus on helpful thoughts:  \"You've been through this before, you can get through it again.\"  Step 3: People and social settings that provide distraction:                 Name: Carmen                            Name: Darien                           Name: Aleida       pool, shopping, Carmen's house, [employer]       Step 4: Remind myself of people and things that are important to me and worth living for:  Sidney, Aleida Horton, post-COVID world, options of what could be in your future        Step 5: When I am in crisis, I can ask these people to help me use my safety plan:                 Name: Sidney  Step 6: Making the environment safe:   go to sleep/daydream  Step 7: Professionals or agencies I can contact during a crisis:  [facility] Daytime Number: [phone]  Suicide Prevention Lifeline: 1-248-005-TALK (4782)  Crisis Text Line Service (available 24 hours a day, 7 days a week): Text [place] to 012955  Local Crisis Services: [facility] Crisis: [phone]  Adults can always access to the emPATH unit at [facility] (no phone number, utilize it like an urgent care or ER where you just show up)     Call 911 or go to my nearest emergency department.       I helped develop this safety plan and agree to use it when needed.  I have been given a copy of this plan.       Client signature _________________________________________________________________  Today s date:  11/24/2020  Adapted from Safety Plan Template 2008 Randi Poole and Robby Barba is reprinted with the express permission of the authors.  No portion of the Safety Plan Template may be reproduced without the express, written permission.  You can contact the authors at [email or web address] or [email or web address].

## 2024-01-29 NOTE — PATIENT INSTRUCTIONS
Zack Zhou-    Good to see you today. Here is a re-cap of our plan:  -Continue lamotrigine 50mg daily for 2 weeks, then increase to 75mg daily     Lamotrigine dosing schedule:   01/27/24-2/10/24: Take 50mg daily, THEN:  02/11/24: Increase to 75mg daily    A refill has been sent to your pharmacy.     Jeannette Meléndez     **For crisis resources, please see the information at the end of this document**   Patient Education    Thank you for coming to the [facility] [place] [facility].     Lab Testing:  If you had lab testing today and your results are reassuring or normal they will be mailed to you or sent through [employer] within 7 days. If the lab tests need quick action we will call you with the results. The phone number we will call with results is # [phone]. If this is not the best number please call our clinic and change the number.     Medication Refills:  If you need any refills please call your pharmacy and they will contact us. Our fax number for refills is [phone].   Three business days of notice are needed for general medication refill requests.   Five business days of notice are needed for controlled substance refill requests.   If you need to change to a different pharmacy, please contact the new pharmacy directly. The new pharmacy will help you get your medications transferred.     Contact Us:  Please call [phone] during business hours (8-5:00 M-F).   If you have medication related questions after clinic hours, or on the weekend, please call [phone].     Financial Assistance [phone]   Medical Records [phone]       MENTAL HEALTH CRISIS RESOURCES:  For a emergency help, please call 911 or go to the nearest Emergency Department.     Emergency Walk-In Options:   EmPATH Unit @ [facility] ([place]): [phone] - Specialized mental health emergency area designed to be calming  [facility] ([place]): [phone]  [facility] [place]  Acute Psychiatry Services ([place]): [phone]  [facility] ([place]): [phone]    [facility] Crisis Information:   [place]: [phone]  [place]: [phone]  [place] (NINFA) - Adult: [phone]     Child: [phone]  [place] - Adult: [phone]     Child: [phone]  [place]: [phone]  List of all [facility] resources:   https://[place].[facility]/dhs/people-we-serve/adults/health-care/mental-health/resources/crisis-contacts.jsp    National Crisis Information:   Crisis Text Line: Text  [place]  to 161178  Suicide & Crisis Lifeline: 988  National Suicide Prevention Lifeline: 5-836-361-TALK (1-320.670.4449)       For online chat options, visit https://suicidepreventionlifeline.org/chat/  Poison Control Center: 1-836.826.5120  Trans Lifeline: 3-959-902-9692 - Hotline for transgender people of all ages  The [employer]: 0-008-403-0573 - Hotline for LGBT youth     For Non-Emergency Support:   Fast Tracker: Mental Health & Substance Use Disorder Resources -   https://www.[employer].org/

## 2024-01-29 NOTE — PROGRESS NOTES
Virtual Visit Details    Type of service:  Video Visit   Video Start Time:  8:55AM  Video End Time: 9:48AM    Originating Location (pt. Location): Home    Distant Location (provider location):  On-site  Platform used for Video Visit: Tara

## 2024-01-29 NOTE — NURSING NOTE
Is the patient currently in the state of [place]? YES    Visit mode:VIDEO    If the visit is dropped, the patient can be reconnected by: VIDEO VISIT: Text to cell phone:   Telephone Information:   Mobile [phone]    and VIDEO VISIT: Send to e-mail at: [email or web address]    Will anyone else be joining the visit? NO  (If patient encounters technical issues they should call [phone])    How would you like to obtain your AVS? MyChart    Are changes needed to the allergy or medication list? Pt stated no changes to allergies and Pt stated no med changes    Reason for visit: TK Meeks F

## 2024-01-30 ENCOUNTER — OFFICE VISIT (OUTPATIENT)
Dept: PSYCHIATRY | Facility: CLINIC | Age: 69
End: 2024-01-30
Payer: MEDICARE

## 2024-01-30 ENCOUNTER — VIRTUAL VISIT (OUTPATIENT)
Dept: PSYCHIATRY | Facility: CLINIC | Age: 69
End: 2024-01-30
Attending: PSYCHOLOGIST
Payer: MEDICARE

## 2024-01-30 VITALS
WEIGHT: 206 LBS | SYSTOLIC BLOOD PRESSURE: 146 MMHG | BODY MASS INDEX: 33.25 KG/M2 | TEMPERATURE: 97.5 F | DIASTOLIC BLOOD PRESSURE: 93 MMHG | HEART RATE: 98 BPM

## 2024-01-30 DIAGNOSIS — F11.90 CHRONIC, CONTINUOUS USE OF OPIOIDS: ICD-10-CM

## 2024-01-30 DIAGNOSIS — F43.10 PTSD (POST-TRAUMATIC STRESS DISORDER): ICD-10-CM

## 2024-01-30 DIAGNOSIS — F41.1 GAD (GENERALIZED ANXIETY DISORDER): ICD-10-CM

## 2024-01-30 DIAGNOSIS — F33.2 SEVERE EPISODE OF RECURRENT MAJOR DEPRESSIVE DISORDER, WITHOUT PSYCHOTIC FEATURES (H): Primary | ICD-10-CM

## 2024-01-30 DIAGNOSIS — F60.3 BORDERLINE PERSONALITY DISORDER (H): ICD-10-CM

## 2024-01-30 DIAGNOSIS — F31.81 BIPOLAR 2 DISORDER (H): Primary | ICD-10-CM

## 2024-01-30 PROCEDURE — 90837 PSYTX W PT 60 MINUTES: CPT | Mod: 95 | Performed by: PSYCHOLOGIST

## 2024-01-30 NOTE — PATIENT INSTRUCTIONS
"Today's Recommendations:  - We are sorry to hear how much you have been suffering all these years.  You have tried a number of different treatments and had a lot of side-effects, and that makes things hard.  However, we are optimistic that we can help - we have a number of other treatments to consider now and moving forward.    As the first step, we will plan to start with rTMS:  - TMS is generally recommended early in the treatment course, as response is typically longer-lasting and it is very well tolerated              -- This treatment course would involve 10-30 minute sessions, 5 days per week for 6 weeks              -- These treatments would be administered at the [facility] where you underwent your initial evaluation  - Specific risks/benefits of rTMS treatment include the common side effects (headache, mild anxiety, visual effects, dizziness) and the extremely rare possibility of a seizure.  - Contrary to what some believe, memory loss is NOT a TMS side effect, but rather an Electroconvulsive therapy (ECT) side effect  - Here is a TMS YouTube video that can be informative: https://www.youInContext Solutionsube.com/watch?v=QujmDlkpa1R&t=10   - You can expect to hear from us in about 2 weeks with an update on your insurance approval.  Please call us if you do not.      If you only have a partial response to rTMS or the response is short lived, we may want to start thinking about vagus nerve stimulation (VNS) as a long-term treatment.  VNS is a surgical treatment -- it involves a small stimulating electrode wrapped around a nerve in your neck.  The  website, with at least some patient outcomes and useful information, is:  [email or web address]    The most critical primary article (jargon-y but if you want to see it) is:  https://ajp.psychiatryonline.org/doi/10.1176/appi.ajp.2017.75384875    The big take-home points from that:  VNS is what we would call an \"adjunctive\" treatment. It boosts the efficacy " of other things and/or prevents relapse if we find something else that brings you to response. It usually does not work on its own.  It is a surgical treatment, done as an outpatient surgery. It does not involve brain surgery, just nerves in the neck. Most people go home from that surgery the same day.  Roughly 2/3 of people who get a VNS ultimately respond (have depressive improvement), but it often takes 6-12 months to get the benefit. That has included people who have not responded to anything else, including ECT. The chances are better for people who have responded to at least one other treatment, but there are no completely hopeless cases.  The biggest side effects are voice changes (frequent, when the stimulator fires every five minutes) and pain in the throat/neck (temporary, fades within a week of dose adjustment). Some people experience blood pressure/heart rate changes or difficulty exercising, but there are ways to control the stimulator to prevent these from impairing your life.  VNS insurance coverage is still variable. For Medicare, it requires enrolling in a clinical trial. Private insurers will pay for it about half the time. We do usually have to file an appeal or two, but we win most of our cases.  VNS is intensive at first because we need to meet every two weeks to adjust the stimulator. Once it's adjusted, it becomes less frequent, and some patients only see us once a year for a battery check.  It does require ongoing surgeries to change the battery, every six to eight years. These are uncomfortable, but relatively quick, some do not even need full anesthesia.      In addition to suggestions today about interventions like TMS and VNS, we think you are likely to benefit from a psychotherapy called DBT, which is tailored to people with borderline personality traits and may be especially helpful in regulating the ups-and-downs in your mood and helping in times of significant distress.  Talk to your  existing therapist about whether she thinks DBT could be helpful for you and if she has any recommendations for DBT programs near you.  We can also provide some referrals if she does not.    We also suggested a number of medication options for you to think about with your psychiatrist. You should not make these changes as we approach TMS, but if there is a long wait or additional treatment is needed afterward, could consider:  Atypical antipsychotics:  - These medications are added to your main antidepressants.  They can help with your depression and also stabilizing your mood swings and anger  - There are many different options available, but we would often think about aripiprazole (Abilify) or quetiapine (Seroquel)   - They can definitely have side-effects such as weight gain, so this is something to talk more about with your main psychiatrist    Serotonin modulators:  - These are newer antidepressants that are less likely to cause some of the side-effects of the older SSRIs and SNRIs  - There are two available: vortioxetine and vilazodone  - You would still want to monitor closely as they act on the serotonin syndrome    Monoamine oxidase inhibitors (MAOIs):  This is an older class of antidepressant that is not easy to take. MAOIs have substantial side effects: sleep disruption, blood pressure changes (especially leading to dizziness), sometimes nausea, and sometimes over-sedation. They are also very dangerous in overdose.  However, they are also uniquely effective because unlike other antidepressants, they have major effects on dopamine systems, a neurotransmitter involved in motivation and drive.  Some reasonable accessible articles for more information:  https://www.[facility].org/diseases-conditions/depression/in-depth/maois/art-03904983  https://www.psychiatrist.com/jcp/depression/grda-notseelpg-xdrrgts-inhibitors-depression-treatment/    MAOIs come in two forms: a skin patch (brand name EmSam) and oral.  "Some people believe the skin patch is less effective, and it is often not covered by insurance.  The oral versions have a significant risk: in combination with certain foods, they can rapidly raise blood pressure and be fatal (\"hypertensive crisis\"). To prevent that, they come with diet restrictions:  https://www.[facility].org/diseases-conditions/depression/expert-answers/maois/faq-08191634  https://www.health.qld.gov.au/__data/assets/pdf_file/0020/822377/oncol_maoi.pdf  Because this is an ordinary medication (not an intervention), an MAOI is something you'd do with your primary psychiatrist. If they want some guidance/help with how to prescribe we can offer that.     Auvelity (dextromethorphan/bupropion):  This medication is a newly approved treatment for major depressive disorder that is thought to work in a different way from traditional antidepressants.  There is also evidence that it can work more quickly than other antidepressants.  The mechanism is thought to be more similar to that of ketamine compared to traditional antidepressants, although it is not entirely understood.  This is a combination medication in which the active medication dextromethorphan is allowed to work in the body for longer due to an interaction with bupropion (which itself is another antidepressant).  The most common side-effects are dizziness, nausea, diarrhea, drowsiness, and headache.  It also has the potential to be habit forming, although this has not been shown with this combination drug in particular.  As a new medication, there is not a lot of information about how this medication works in the long term.  It is also expensive and may be difficult to get covered by insurance.  Because this is an ordinary medication (not an intervention), this is a medication that your primary psychiatrist would prescribe.       We will plan to start with TMS as discussed above.  We will see you in clinic for a follow-up after you complete your " "TMS course.    --    We are specifically a university and research clinic, and there are often research studies ongoing. Some of those are clinical trials of new brain stimulation treatments. Others are what we would call \"biomarker\" studies, where we ask you to participate in some kind of measurement while you are undergoing regular treatment.   If you are interested in hearing about research consider signing up for our research registry. This will allow researchers in our clinic to reach out if you become eligible for a study. Sign up today at [email or web address] [email or web address]    In some cases, a clinical trial is the only way to get access to an advanced treatment that is not covered by your insurance. Usually, we will talk about this in your visit if it is an option.      Patient Education       General Information:  Our Treatment-Resistant Disorders/Interventional Psychiatry clinic is what is often called a \"consultation\" or \"tertiary care\" clinic. That means we do not see patients long-term for medication management or talk therapy. We offer thorough evaluations, recommendations about medications/therapies you may have not yet tried, and in some cases, brain stimulation or office-based treatments. If you are likely to benefit from one of those advanced treatments, we will have talked about it today. Once that treatment is complete, we will see you once or twice afterwards to check in, and then you will return to getting ongoing psychiatric care from whomever you were seeing before you came for your evaluation with us. If for some reason you no longer have access to that clinician, we can help with a referral to our main [facility]. The only patients we see long-term are patients with implanted medical devices that require ongoing monitoring and programming.     Our recommendations almost always include some kind of cognitive-behavioral therapy (CBT) if you are not getting it already. Brain and " nerve stimulation treatments work best when combined with certain talk therapies. We make these recommendations because we strongly believe that, without the therapy piece, most people will not get better, or will have limited clinical benefit. It is often difficult or inconvenient to add therapy to an already busy schedule, but it is also necessary.    It is important to remember that, like all mental health treatments, our interventional therapies are not perfect. About one third of people will not feel better after treatment, and even when they work, they do not take away the symptoms entirely.If we have recommended something above, it means we think there is a better than even chance of it working, but things are never guaranteed. This is another reason we usually recommend CBT along with our advanced treatments -- it can address the symptoms that remain after the stimulation/ketamine treatment.       While we are waiting to implement the recommendations you and I have discussed, you should know what to do if your symptoms worsen. A variety of crisis numbers/resources are available. They include:    CRISIS GENERAL NUMBERS   2-847-[phone] ([phone])  OR  911     CRISIS INTERVENTION TEAM (CIT) - this is a POLICE UNIT, specially trained.  This website has information for all of [place]'s CITs. Lays out which areas have this team.  Http://cit.[facility] [place]/citmap/[place].php  However, one can just call 911 and ask for this special team.   If police need to be called, DO ask for this team.    CRISIS MOBILE TEAMS  [and see end of this phrase*]  [facility] -COPE: 24hrs/7days:    [phone]  (Adults > 17yo)    [phone]  (Child   < 16yo)                                       FRONT DOOR (during the day could call)   [phone]    [facility] -[phone] (Adult)  [phone] (Child)     [facility] -[phone] (Adult and Child)     [facility] -[phone] ([facility]  "HEALTH mobile crisis team; Adult and Child; 24hr)     [facility] -[phone] (Adult and Child)     CRISIS HOUSING  [facility]                           [address]              [phone]  [facility]                                [address]                       [phone]  [facility]                               [address]     [phone]   [facility]  [address]                [phone]    [facility] EMERGENCY NUMBERS    Crisis Connection:                                [phone]  [facility]:     [phone]  Crisis Intervention:                                [phone] or [phone]   COPE: Mobile Team 24hrs/7days:    [phone]  (Adults > 17yo)                                                                            [phone]  (Child   < 16yo)  [facility]        [phone] 24/7 line and Mobile Team   [address] [place] [address]  DROP IN:  M-F: 8:00 am - 7:00 pm  Sat: 11:00 am - 3:00 pm   Sun: Closed     WALK-IN COUNSELING:  [facility]       [phone]    [address]:   M, W, F:  1:00-3:00PM    M-Th:  6:30-8:30PM    TRANS and LGBT  Call [employer] at [phone]. This service is staffed by trans people 24/7.   LGBT youth <24 contemplating suicide, call the [employer] [phone].    POISON CONTROL CENTER  [phone]  OR   OR  go to nearest ER    CHILD  \"[facility] has a needs assessment team who will do an intake evaluation. Based on the results of the intake they will recommended inpatient admission, partial hospitalization, intensive outpatient or outpatient care. The number is [phone]. \"    CRISIS TEXT LINE  Http://www.crisistextline.org  FREE SUPPORT AT YOUR FINGERTIPS, 24/7  Crisis Text " "Line serves anyone, in any type of crisis, providing access to free, 24/ support and information via the medium people already use and trust: text. Here s how it works:  1)  Text 916943 from anywhere in the [place], anytime, about any type of crisis.  2)  A live, trained Crisis Counselor receives the text and responds quickly.      The volunteer Crisis Counselor will help you move from a 'hot moment to a cool moment'    [facility] EMERGENCY NUMBERS    Crisis Connection:                                [phone]  [facility]:              [phone]  Crisis Intervention:                                [phone] or [phone]   COPE: Mobile Team 24hrs/7days:    [phone]  (Adults > 19yo)                                                                            [phone]  (Child   < 16yo)  [facility]        [phone]  CALL 24 hours a day.  [address] [place] [address]  DROP IN:  M-F: 8:00 am - 7:00 pm  Sat: 11:00 am - 3:00 pm   Sun: Closed    WALK-IN COUNSELIN)  [facility]                                  [phone]        [address]:                  M, W:      5:00-7:00PM       2)  [facility]                            [phone]        [place], [facility]                T, Th:      6:00-8:00PM    TRANS and LGBT  Call [employer] at [phone]. This service is staffed by trans people .   LGBT youth <24 contemplating suicide, call the [employer] [phone].    POISON CONTROL CENTER  [phone]    OR  go to nearest ER    CHILD  \"[facility] has a needs assessment team who will do an intake evaluation. Based on the results of the intake they will recommended inpatient admission, partial hospitalization, intensive outpatient or outpatient care. The number is [phone] or [phone]. \"    CRISIS TEXT LINE  Http://www.crisistextline.org  FREE SUPPORT AT YOUR FINGERTIPS, " 24/7  Crisis Text Line serves anyone, in any type of crisis, providing access to free, 24/7 support and information via the medium people already use and trust: text. Here s how it works:  1)  Text 741-178 from anywhere in the [place], anytime, about any type of crisis.  2)  A live, trained Crisis Counselor receives the text and responds quickly.      The volunteer Crisis Counselor will help you move from a 'hot moment to a cool moment'      * A Community Paramedic (CP) is an advanced paramedic that works to increase access to primary and preventive care and decrease use of emergency departments, which in turn decreases health care costs. Among other things, CPs may play a key role in providing follow-up services after a hospital discharge to prevent hospital readmission. CPs can provide health assessments, chronic disease monitoring and education, medication management, immunizations and vaccinations, laboratory specimen collection, hospital discharge follow-up care and minor medical procedures. CPs work under the direction of an Ambulance Medical Director.

## 2024-02-01 ENCOUNTER — VIRTUAL VISIT (OUTPATIENT)
Dept: PSYCHOLOGY | Facility: CLINIC | Age: 69
End: 2024-02-01
Payer: MEDICARE

## 2024-02-01 DIAGNOSIS — F43.9 TRAUMA AND STRESSOR-RELATED DISORDER: ICD-10-CM

## 2024-02-01 DIAGNOSIS — F41.1 GENERALIZED ANXIETY DISORDER: ICD-10-CM

## 2024-02-01 DIAGNOSIS — F31.81 BIPOLAR 2 DISORDER (H): Primary | ICD-10-CM

## 2024-02-01 PROCEDURE — 90834 PSYTX W PT 45 MINUTES: CPT | Mod: 95 | Performed by: SOCIAL WORKER

## 2024-02-01 NOTE — PROGRESS NOTES
[facility]                                     Progress Note    Patient Name: Randi Cleary  Date: 2/2/24         Service Type: Individual     Session Start Time: 9:30 AM Session End Time: 10:20 AM     Session Length: 50 minutes    Session #: 231    Attendees: Client attended alone    Service Modality:  Video Visit:      Provider verified identity through the following two step process.  Patient provided:  Patient is known previously to provider    Telemedicine Visit: The patient's condition can be safely assessed and treated via synchronous audio and visual telemedicine encounter.      Reason for Telemedicine Visit: Patient convenience (e.g. access to timely appointments / distance to available provider)    Originating Site (Patient Location): Patient's home    Distant Site (Provider Location): Provider Remote Setting- Home Office    Consent:  The patient/guardian has verbally consented to: the potential risks and benefits of telemedicine (video visit) versus in person care; bill my insurance or make self-payment for services provided; and responsibility for payment of non-covered services.     Patient would like the video invitation sent by:  My Chart    Mode of Communication:  Video Conference via [facility]    Distant Location (Provider):  Off-site    As the provider I attest to compliance with applicable laws and regulations related to telemedicine.       DATA  Extended Session (53+ minutes): No  Interactive Complexity: No  Crisis: No        Progress Since Last Session (Related to Symptoms / Goals / Homework):   Symptoms:  Patient reports feeling hopeful      Homework:  Progress made    Continue looking at records  Look at taking one thing at the time  Continue to process past     Episode of Care Goals: Satisfactory progress - ACTION (Actively working towards change); Intervened by reinforcing change plan / affirming steps taken     Current / Ongoing Stressors and Concerns:   Patient is  currently socially isolated. She has a conflictual relationship with her .  She is getting minimal physical activity.  She has had several surgeries.      Treatment Objective(s) Addressed in This Session:   Patient will increase frequency of engaging her in ADLs.  Patient will track and record at least 5 pleasant exchanges with . Patient will be able to identify at least 5 positive traits about her .  Patient will reduce level of depressive and anxious features as evidenced by reduction in score on her CHAVO-7 and PHQ-9 (scores of 15 and 16 at first measurment, respectively).     Intervention:   Supportive Therapy:   Reviewed doctor's appointment with interventional psychiatry team. Again discussed DBT, patient is more supportive of this than in the past, so a referral was placed. Discussed other recommendations from psychiatry, including TMS. Discussed diagnosis of borderline personality disorder and how this fit with them.     The following assessments were completed by patient for this visit:  PHQ9:       12/4/2023    12:58 AM 12/18/2023     3:01 PM 12/19/2023     3:50 PM 12/29/2023    12:48 PM 1/15/2024    11:57 AM 1/22/2024    11:59 AM 1/28/2024    10:59 PM   PHQ-9 SCORE   PHQ-9 Total Score MyChart 15 (Moderately severe depression) 19 (Moderately severe depression)   17 (Moderately severe depression) 15 (Moderately severe depression) 19 (Moderately severe depression)   PHQ-9 Total Score 15    15 19 16 20 17 15 19     GAD7:       9/20/2023     9:59 AM 10/9/2023     8:55 AM 10/31/2023     4:25 AM 12/1/2023    11:37 AM 12/18/2023     3:09 PM 1/15/2024    12:02 PM 1/29/2024     3:10 AM   CHAVO-7 SCORE   Total Score 16 (severe anxiety) 15 (severe anxiety) 15 (severe anxiety) 14 (moderate anxiety) 16 (severe anxiety) 14 (moderate anxiety) 16 (severe anxiety)   Total Score 16    16    16 15 15 14 16 14 16     PROMIS 10-Global Health (only subscores and total score):       7/21/2023    10:36 AM  7/28/2023     3:24 PM 8/7/2023     1:07 PM 9/20/2023    10:13 AM 10/31/2023     4:36 AM 12/1/2023    11:52 AM 1/22/2024    12:00 PM   PROMIS-10 Scores Only   Global Mental Health Score 5    5  6    6     5    5 6  5   Global Physical Health Score 7    7  8    8     9    9 8  9   PROMIS TOTAL - SUBSCORES 12    12  14    14     14    14 14  14       Information is confidential and restricted. Go to Review Flowsheets to unlock data.    Multiple values from one day are sorted in reverse-chronological order        ASSESSMENT: Current Emotional / Mental Status (status of significant symptoms):   Risk status (Self / Other harm or suicidal ideation)   Patient denies current fears or concerns for personal safety.   Patient denies current or recent suicidal ideation or behaviors.   Patient denies current or recent homicidal ideation or behaviors.   Patient denies current or recent self injurious behavior or ideation.   Patient denies other safety concerns.   Patient reports there has been no change in risk factors since their last session.     Patient reports there has been no change in protective factors since their last session.     A safety and risk management plan has been developed including: Patient consented to co-developed safety plan on 11/24/2020, updated 2/20/23.  Safety and risk management plan was reviewed.   Patient agreed to use safety plan should any safety concerns arise.  A copy was made available to the patient.     Appearance:   Appropriate    Eye Contact:   Good    Psychomotor Behavior: Normal    Attitude:   Cooperative    Orientation:   All   Speech    Rate / Production: Normal/ Responsive    Volume:  Normal    Mood:    Anxious    Affect:    Appropriate    Thought Content:  Clear    Thought Form:  Coherent    Insight:    Good      Medication Review:   No changes to current psychiatric medication(s)      Medication Compliance:   Yes     Changes in Health Issues:   None reported     Chemical Use  Review:   Substance Use: Chemical use reviewed, no active concerns identified Nothing used since 2021.     Tobacco Use: No current tobacco use.      Diagnosis:  1. Bipolar 2 disorder (H)    2. Generalized anxiety disorder    3. Trauma and stressor-related disorder      Collateral Reports Completed:   Not Applicable    PLAN: (Patient Tasks / Therapist Tasks / Other)  DBT referral placed  Decide on how to move forward with TMS  Start women's group  Look at taking one thing at the time  Continue to process past        There has been demonstrated improvement in functioning while patient has been engaged in psychotherapy/psychological service- if withdrawn the patient would deteriorate and/or relapse.     MICAELA SLADE, [facility]   2024                                                        ______________________________________________________________________    Individual Treatment Plan    Patient's Name: Randi Cleary  YOB: 1955    Date of Creation: 20  Date Treatment Plan Last Reviewed/Revised: 23    DSM5 Diagnoses: 296.89 Bipolar II Disorder Depressed, 300.02 (F41.1) Generalized Anxiety Disorder, or Adjustment Disorders  309.89 (F43.8) Other Specified Trauma and Stressor Related Disorder  Psychosocial / Contextual Factors: Patient's entire family of origin has , she now has a sister-in-law and  as support.  Relationship with  is conflictual. She is recovering from surgeries  PROMIS (reviewed every 90 days): PROMIS-10 Scores  PROMIS 10-Global Health (only subscores and total score):   PROMIS-10 Scores Only 2021 3/15/2022 3/15/2022 3/15/2022 3/24/2022 2022 2022   Global Mental Health Score 6 6 6 6 8 12 12   Global Physical Health Score 9 9 9 9 8 11 10   PROMIS TOTAL - SUBSCORES 15 15 15 15 16 23 22       Referral / Collaboration:  Referral to another professional/service is not indicated at this time..    Anticipated number of session  "for this episode of care: 50  Anticipation frequency of session: Biweekly  Anticipated Duration of each session: 53 or more minutes due to intensity of trauma symptoms  Treatment plan will be reviewed in 90 days or when goals have been changed.   There has been demonstrated improvement in functioning while patient has been engaged in psychotherapy/psychological service- if withdrawn the patient would deteriorate and/or relapse.       MeasurableTreatment Goal(s) related to diagnosis / functional impairment(s)  Goal 1: Patient will \"jumpstart, getting going with the things I need to be doing around the house as far as picking up, doing things, trying to do something every day.  Also to lessen the animosity between me and my .\"    I will know I've met my goal when my shoulders are fixed and I can see.      Objective #A (Patient Action)    Patient will  increase frequency of engaging her in ADLs .  Status: Continued - Date(s): 12/10/21, 3/9/22, 6/9/22, 9/2/22, 12/1/22, 3/2/23, 6/6/23, 9/13/23, 12/14/23    Intervention(s)  Therapist will  engage patient in CBT, specifically behavioral activation .    Objective #B  Patient will   track and record at least 5 pleasant exchanges with . Patient will be able to identify at least 5 positive traits about her  and how he relates to her .  Status: Continued - Date(s): 12/10/21, 3/9/22, 6/9/22, 9/2/22, 12/1/22, 3/2/23, 6/6/23, 9/13/23, 12/14/23    Intervention(s)  Therapist will  teach assertiveness skills and assign homework related to relationship interactions .    Objective #C  Patient will  reduce level of depressive and anxious features as evidenced by reduction in score on her CHAVO-7 and PHQ-9 (scores of 15 and 16 at first measurment, respectively) .  Status: Continued - Date(s): 12/10/21, 3/9/22, 6/9/22, 9/2/22, 12/1/22, 3/2/23, 6/6/23, 9/13/23, 12/14/23    Intervention(s)  Therapist will  engage patient in person-centered therapy and CBT .    Patient has " "reviewed and agreed to the above plan.      CRUZ SAMUELMICAELA PAULINO JO, [facility]  December 14, 2023                                                   Randi Cleary          SAFETY PLAN:  Step 1: Warning signs / cues (Thoughts, images, mood, situation, behavior) that a crisis may be developing:  Thoughts: \"I don't want to continue\" \"I am unwanted\"  Images: none  Thinking Processes: ruminating  Mood: anger  Behaviors: isolating/withdrawing , can't stop crying, not taking care of myself and not taking care of my responsibilities  Situations: small triggers, such as not being able to find something, or dropping something   Step 2: Coping strategies - Things I can do to take my mind off of my problems without contacting another person (relaxation technique, physical activity):  Distress Tolerance Strategies:  arts and crafts: drawing, play with my pet , listen to positive and upbeat music: any, change body temperature (ice pack/cold water)  and paced breathing/progressive muscle relaxation  Physical Activities: go for a walk, deep breathing and stretching   Focus on helpful thoughts:  \"You've been through this before, you can get through it again.\"  Step 3: People and social settings that provide distraction:                 Name: Carmen                            Name: D[place]                           Name: Aleida       pool, shopping, Carmen's house, [employer]       Step 4: Remind myself of people and things that are important to me and worth living for:  Clifford Little Donna, post-COVID world, options of what could be in your future        Step 5: When I am in crisis, I can ask these people to help me use my safety plan:                 Name: Sidney  Step 6: Making the environment safe:   go to sleep/daydream  Step 7: Professionals or agencies I can contact during a crisis:  [facility] Daytime Number: 908-003-5010  Suicide Prevention Lifeline: 7-164-506-BCAT (9970)  Crisis Text Line Service (available 24 hours a day, " 7 days a week): Text [place] to 645118  Local Crisis Services: [facility] Crisis: [phone]  Adults can always access to the emPATH unit at [facility] (no phone number, utilize it like an urgent care or ER where you just show up)     Call 911 or go to my nearest emergency department.       I helped develop this safety plan and agree to use it when needed.  I have been given a copy of this plan.       Client signature _________________________________________________________________  Today s date:  11/24/2020  Adapted from Safety Plan Template 2008 Randi Poole and Robby Barba is reprinted with the express permission of the authors.  No portion of the Safety Plan Template may be reproduced without the express, written permission.  You can contact the authors at [email or web address] or [email or web address].

## 2024-02-05 ENCOUNTER — VIRTUAL VISIT (OUTPATIENT)
Dept: PSYCHOLOGY | Facility: CLINIC | Age: 69
End: 2024-02-05
Payer: MEDICARE

## 2024-02-05 DIAGNOSIS — F41.1 GENERALIZED ANXIETY DISORDER: ICD-10-CM

## 2024-02-05 DIAGNOSIS — F31.81 BIPOLAR 2 DISORDER (H): Primary | ICD-10-CM

## 2024-02-05 DIAGNOSIS — F43.9 TRAUMA AND STRESSOR-RELATED DISORDER: ICD-10-CM

## 2024-02-05 PROCEDURE — 90837 PSYTX W PT 60 MINUTES: CPT | Mod: 95 | Performed by: SOCIAL WORKER

## 2024-02-05 NOTE — PROGRESS NOTES
"[facility]                                     Progress Note    Patient Name: Randi Cleary  Date: 2/5/24         Service Type: Individual     Session Start Time: 12:02 PM Session End Time: 12:55 PM     Session Length: 53 minutes    Session #: 232    Attendees: Client attended alone    Service Modality:  Video Visit:      Provider verified identity through the following two step process.  Patient provided:  Patient is known previously to provider    Telemedicine Visit: The patient's condition can be safely assessed and treated via synchronous audio and visual telemedicine encounter.      Reason for Telemedicine Visit: Patient convenience (e.g. access to timely appointments / distance to available provider)    Originating Site (Patient Location): Patient's home    Distant Site (Provider Location): Provider Remote Setting- Home Office    Consent:  The patient/guardian has verbally consented to: the potential risks and benefits of telemedicine (video visit) versus in person care; bill my insurance or make self-payment for services provided; and responsibility for payment of non-covered services.     Patient would like the video invitation sent by:  My Chart    Mode of Communication:  [facility] Conference [facility]    Distant Location (Provider):  Off-site    As the provider I attest to compliance with applicable laws and regulations related to telemedicine.       DATA  Extended Session (53+ minutes): No  Interactive Complexity: No  Crisis: No        Progress Since Last Session (Related to Symptoms / Goals / Homework):   Symptoms:  Patient reports lots of \"big blow ups,\" with irritability and anger      Homework:  Progress made    Continue looking at records  Look at taking one thing at the time  Continue to process past     Episode of Care Goals: Satisfactory progress - ACTION (Actively working towards change); Intervened by reinforcing change plan / affirming steps taken     Current / Ongoing " Stressors and Concerns:   Patient is currently socially isolated. She has a conflictual relationship with her .  She is getting minimal physical activity.  She has had several surgeries.      Treatment Objective(s) Addressed in This Session:   Patient will increase frequency of engaging her in ADLs.  Patient will track and record at least 5 pleasant exchanges with . Patient will be able to identify at least 5 positive traits about her .  Patient will reduce level of depressive and anxious features as evidenced by reduction in score on her CHAVO-7 and PHQ-9 (scores of 15 and 16 at first measurment, respectively).     Intervention:   Supportive Therapy:   Patient is uncertain about whether or not her medication is helping her anger and is considering going off of the medication as a result of her recent anger outbursts. Reviewed again information from Dr. Varela that patient wanted to go over. Discussed talking to the primary she was going to see about what all she wanted help with and asking if this person could help with these things.     The following assessments were completed by patient for this visit:  PHQ9:       12/4/2023    12:58 AM 12/18/2023     3:01 PM 12/19/2023     3:50 PM 12/29/2023    12:48 PM 1/15/2024    11:57 AM 1/22/2024    11:59 AM 1/28/2024    10:59 PM   PHQ-9 SCORE   PHQ-9 Total Score MyChart 15 (Moderately severe depression) 19 (Moderately severe depression)   17 (Moderately severe depression) 15 (Moderately severe depression) 19 (Moderately severe depression)   PHQ-9 Total Score 15    15 19 16 20 17 15 19     GAD7:       9/20/2023     9:59 AM 10/9/2023     8:55 AM 10/31/2023     4:25 AM 12/1/2023    11:37 AM 12/18/2023     3:09 PM 1/15/2024    12:02 PM 1/29/2024     3:10 AM   CHAVO-7 SCORE   Total Score 16 (severe anxiety) 15 (severe anxiety) 15 (severe anxiety) 14 (moderate anxiety) 16 (severe anxiety) 14 (moderate anxiety) 16 (severe anxiety)   Total Score 16    16    16 15  15 14 16 14 16     PROMIS 10-Global Health (only subscores and total score):       7/21/2023    10:36 AM 7/28/2023     3:24 PM 8/7/2023     1:07 PM 9/20/2023    10:13 AM 10/31/2023     4:36 AM 12/1/2023    11:52 AM 1/22/2024    12:00 PM   PROMIS-10 Scores Only   Global Mental Health Score 5    5  6    6     5    5 6  5   Global Physical Health Score 7    7  8    8     9    9 8  9   PROMIS TOTAL - SUBSCORES 12    12  14    14     14    14 14  14       Information is confidential and restricted. Go to Review Flowsheets to unlock data.    Multiple values from one day are sorted in reverse-chronological order        ASSESSMENT: Current Emotional / Mental Status (status of significant symptoms):   Risk status (Self / Other harm or suicidal ideation)   Patient denies current fears or concerns for personal safety.   Patient denies current or recent suicidal ideation or behaviors.   Patient denies current or recent homicidal ideation or behaviors.   Patient denies current or recent self injurious behavior or ideation.   Patient denies other safety concerns.   Patient reports there has been no change in risk factors since their last session.     Patient reports there has been no change in protective factors since their last session.     A safety and risk management plan has been developed including: Patient consented to co-developed safety plan on 11/24/2020, updated 2/20/23.  Safety and risk management plan was reviewed.   Patient agreed to use safety plan should any safety concerns arise.  A copy was made available to the patient.     Appearance:   Appropriate    Eye Contact:   Good    Psychomotor Behavior: Normal    Attitude:   Cooperative    Orientation:   All   Speech    Rate / Production: Normal/ Responsive    Volume:  Normal    Mood:    Anxious    Affect:    Appropriate    Thought Content:  Clear    Thought Form:  Coherent    Insight:    Good      Medication Review:   No changes to current psychiatric medication(s)       Medication Compliance:   Yes     Changes in Health Issues:   None reported     Chemical Use Review:   Substance Use: Chemical use reviewed, no active concerns identified Nothing used since 2021.     Tobacco Use: No current tobacco use.      Diagnosis:  1. Bipolar 2 disorder (H)    2. Generalized anxiety disorder    3. Trauma and stressor-related disorder        Collateral Reports Completed:   Not Applicable    PLAN: (Patient Tasks / Therapist Tasks / Other)  Decide on how to move forward with TMS  Start women's group  Look at taking one thing at the time  Continue to process past        There has been demonstrated improvement in functioning while patient has been engaged in psychotherapy/psychological service- if withdrawn the patient would deteriorate and/or relapse.     MICAELA SLADE, [facility]   2024                                                        ______________________________________________________________________    Individual Treatment Plan    Patient's Name: Randi Cleary  YOB: 1955    Date of Creation: 20  Date Treatment Plan Last Reviewed/Revised: 23    DSM5 Diagnoses: 296.89 Bipolar II Disorder Depressed, 300.02 (F41.1) Generalized Anxiety Disorder, or Adjustment Disorders  309.89 (F43.8) Other Specified Trauma and Stressor Related Disorder  Psychosocial / Contextual Factors: Patient's entire family of origin has , she now has a sister-in-law and  as support.  Relationship with  is conflictual. She is recovering from surgeries  PROMIS (reviewed every 90 days): PROMIS-10 Scores  PROMIS 10-Global Health (only subscores and total score):   PROMIS-10 Scores Only 2021 3/15/2022 3/15/2022 3/15/2022 3/24/2022 2022 2022   Global Mental Health Score 6 6 6 6 8 12 12   Global Physical Health Score 9 9 9 9 8 11 10   PROMIS TOTAL - SUBSCORES 15 15 15 15 16 23 22       Referral / Collaboration:  Referral to another  "professional/service is not indicated at this time..    Anticipated number of session for this episode of care: 50  Anticipation frequency of session: Biweekly  Anticipated Duration of each session: 53 or more minutes due to intensity of trauma symptoms  Treatment plan will be reviewed in 90 days or when goals have been changed.   There has been demonstrated improvement in functioning while patient has been engaged in psychotherapy/psychological service- if withdrawn the patient would deteriorate and/or relapse.       MeasurableTreatment Goal(s) related to diagnosis / functional impairment(s)  Goal 1: Patient will \"jumpstart, getting going with the things I need to be doing around the house as far as picking up, doing things, trying to do something every day.  Also to lessen the animosity between me and my .\"    I will know I've met my goal when my shoulders are fixed and I can see.      Objective #A (Patient Action)    Patient will  increase frequency of engaging her in ADLs .  Status: Continued - Date(s): 12/10/21, 3/9/22, 6/9/22, 9/2/22, 12/1/22, 3/2/23, 6/6/23, 9/13/23, 12/14/23    Intervention(s)  Therapist will  engage patient in CBT, specifically behavioral activation .    Objective #B  Patient will   track and record at least 5 pleasant exchanges with . Patient will be able to identify at least 5 positive traits about her  and how he relates to her .  Status: Continued - Date(s): 12/10/21, 3/9/22, 6/9/22, 9/2/22, 12/1/22, 3/2/23, 6/6/23, 9/13/23, 12/14/23    Intervention(s)  Therapist will  teach assertiveness skills and assign homework related to relationship interactions .    Objective #C  Patient will  reduce level of depressive and anxious features as evidenced by reduction in score on her CHAVO-7 and PHQ-9 (scores of 15 and 16 at first measurment, respectively) .  Status: Continued - Date(s): 12/10/21, 3/9/22, 6/9/22, 9/2/22, 12/1/22, 3/2/23, 6/6/23, 9/13/23, " "12/14/23    Intervention(s)  Therapist will  engage patient in person-centered therapy and CBT .    Patient has reviewed and agreed to the above plan.      MICAELA SLADE, [facility]  December 14, 2023                                                   Randi Cleary          SAFETY PLAN:  Step 1: Warning signs / cues (Thoughts, images, mood, situation, behavior) that a crisis may be developing:  Thoughts: \"I don't want to continue\" \"I am unwanted\"  Images: none  Thinking Processes: ruminating  Mood: anger  Behaviors: isolating/withdrawing , can't stop crying, not taking care of myself and not taking care of my responsibilities  Situations: small triggers, such as not being able to find something, or dropping something   Step 2: Coping strategies - Things I can do to take my mind off of my problems without contacting another person (relaxation technique, physical activity):  Distress Tolerance Strategies:  arts and crafts: drawing, play with my pet , listen to positive and upbeat music: any, change body temperature (ice pack/cold water)  and paced breathing/progressive muscle relaxation  Physical Activities: go for a walk, deep breathing and stretching   Focus on helpful thoughts:  \"You've been through this before, you can get through it again.\"  Step 3: People and social settings that provide distraction:                 Name: Carmen                            Name: Darien                           Name: Aleida       pool, shopping, Carmen's house, [employer]       Step 4: Remind myself of people and things that are important to me and worth living for:  Sidney, Clifford, Aleida, post-COVID world, options of what could be in your future        Step 5: When I am in crisis, I can ask these people to help me use my safety plan:                 Name: Sidney  Step 6: Making the environment safe:   go to sleep/daydream  Step 7: Professionals or agencies I can contact during a crisis:  [facility] Daytime Number: " [phone]  Suicide Prevention Lifeline: 1-866-175-TALK (5268)  Crisis Text Line Service (available 24 hours a day, 7 days a week): Text [place] to 390821  Local Crisis Services: [facility] Crisis: [phone]  Adults can always access to the emPATH unit at [facility] (no phone number, utilize it like an urgent care or ER where you just show up)     Call 911 or go to my nearest emergency department.       I helped develop this safety plan and agree to use it when needed.  I have been given a copy of this plan.       Client signature _________________________________________________________________  Today s date:  11/24/2020  Adapted from Safety Plan Template 2008 Randi Poole and Robby Barba is reprinted with the express permission of the authors.  No portion of the Safety Plan Template may be reproduced without the express, written permission.  You can contact the authors at [email or web address] or [email or web address].

## 2024-02-06 ENCOUNTER — PATIENT OUTREACH (OUTPATIENT)
Dept: CARE COORDINATION | Facility: CLINIC | Age: 69
End: 2024-02-06

## 2024-02-06 ENCOUNTER — VIRTUAL VISIT (OUTPATIENT)
Dept: FAMILY MEDICINE | Facility: CLINIC | Age: 69
End: 2024-02-06
Attending: INTERNAL MEDICINE
Payer: MEDICARE

## 2024-02-06 DIAGNOSIS — Z76.89 ESTABLISHING CARE WITH NEW DOCTOR, ENCOUNTER FOR: Primary | ICD-10-CM

## 2024-02-06 PROCEDURE — 99213 OFFICE O/P EST LOW 20 MIN: CPT | Mod: 95 | Performed by: FAMILY MEDICINE

## 2024-02-06 NOTE — PROGRESS NOTES
"Winnie is a 68 year old who is being evaluated via a billable video visit.      How would you like to obtain your AVS? MyChart  If the video visit is dropped, the invitation should be resent by: Send to e-mail at: [email or web address]  Will anyone else be joining your video visit? No          Assessment & Plan     Establishing care with new doctor, encounter for  -Several chronic medical conditions for which she see specialists  -Discussed scheduling appt in person in about 6 weeks to go over any acute concerns that she may have regarding her health. Pt will pick 2-3 issues to go over and we will sit down and navigate the problems.              BMI  Estimated body mass index is 33.25 kg/m  as calculated from the following:    Height as of 1/23/24: 1.676 m (5' 6\").    Weight as of 1/30/24: 93.4 kg (206 lb).             Subjective   Winnie is a 68 year old, presenting for the following health issues:  Establish Care        2/6/2024     4:15 PM   Additional Questions   Roomed by Chloe stein     Via the Health Maintenance questionnaire, the patient has reported the following services have been completed -Mammogram, this information has been sent to the abstraction team.    History of Present Illness       Reason for visit:  I' looking for  a  Primary provider that will help me through numerous complex health issues past & present,  future surgeries,  concentrating on those currently the most pressing.  I also have specialists that I do blood work/procedures  during the year.    She eats 4 or more servings of fruits and vegetables daily.She consumes 0 sweetened beverage(s) daily.She exercises with enough effort to increase her heart rate 10 to 19 minutes per day.  She exercises with enough effort to increase her heart rate 5 days per week.   She is taking medications regularly.     New patient to me. Wanting to establish care.  Having a hard time finding the right primary, difficulty getting in in-person.  Reports that she " has a complicated medical history.  Hx of hemachromatosis. Follows with hematology.  Hx two torn rotator cuffs, planning for surgery in the future. Currently following with [facility].  Seeing dietician to lose weight.                 Objective           Vitals:  No vitals were obtained today due to virtual visit.    Physical Exam   GENERAL: alert and no distress  EYES: Eyes grossly normal to inspection.  No discharge or erythema, or obvious scleral/conjunctival abnormalities.  RESP: No audible wheeze, cough, or visible cyanosis.    SKIN: Visible skin clear. No significant rash, abnormal pigmentation or lesions.  NEURO: Cranial nerves grossly intact.  Mentation and speech appropriate for age.  PSYCH: Appropriate affect, tone, and pace of words          Video-Visit Details    Type of service:  Video Visit   Video Start Time: 4:50 PM  Video End Time:5:14 PM    Originating Location (pt. Location): Home    Distant Location (provider location):  On-site  Platform used for Video Visit: Tara  Signed Electronically by: Chay Truong DO

## 2024-02-06 NOTE — PROGRESS NOTES
Clinic Care Coordination Contact  Follow Up Progress Note      Assessment: [facility] contacted patient to follow up. Patient reports that she hasn't had a chance to follow up regarding her past psychiatry records. She plans to email her previous provider back today. She will let me know if she needs further support with this.    Care Gaps:    Health Maintenance Due   Topic Date Due    HF ACTION PLAN  Never done    DIABETIC FOOT EXAM  Never done    ADVANCE CARE PLANNING  Never done    COPD ACTION PLAN  Never done    ZOSTER IMMUNIZATION (1 of 2) Never done    MEDICARE ANNUAL WELLNESS VISIT  Never done    EYE EXAM  12/07/2021    MAMMO SCREENING  08/15/2023    COLORECTAL CANCER SCREENING  02/25/2024     Intervention/Education provided during outreach: Patient verbalized understanding, engaged in AIDET communication during patient encounter.     Plan: Patient was encouraged to reach out with any questions or concerns.    Care Coordinator will do no further outreaches at this time.    [facility]  Clinic Care Coordination  [facility]  [email or web address]  [phone]

## 2024-02-07 ENCOUNTER — TELEPHONE (OUTPATIENT)
Dept: PSYCHIATRY | Facility: CLINIC | Age: 69
End: 2024-02-07

## 2024-02-07 ENCOUNTER — VIRTUAL VISIT (OUTPATIENT)
Dept: SLEEP MEDICINE | Facility: CLINIC | Age: 69
End: 2024-02-07
Payer: MEDICARE

## 2024-02-07 VITALS — WEIGHT: 205 LBS | BODY MASS INDEX: 32.95 KG/M2 | HEIGHT: 66 IN

## 2024-02-07 DIAGNOSIS — F11.90 CHRONIC, CONTINUOUS USE OF OPIOIDS: Primary | ICD-10-CM

## 2024-02-07 DIAGNOSIS — G47.33 OSA (OBSTRUCTIVE SLEEP APNEA): ICD-10-CM

## 2024-02-07 DIAGNOSIS — R63.4 WEIGHT LOSS: ICD-10-CM

## 2024-02-07 PROCEDURE — 99214 OFFICE O/P EST MOD 30 MIN: CPT | Mod: 95 | Performed by: INTERNAL MEDICINE

## 2024-02-07 RX ORDER — ZOLPIDEM TARTRATE 5 MG/1
TABLET ORAL
Qty: 1 TABLET | Refills: 0 | Status: ON HOLD | OUTPATIENT
Start: 2024-02-07 | End: 2024-05-22

## 2024-02-07 ASSESSMENT — PATIENT HEALTH QUESTIONNAIRE - PHQ9: SUM OF ALL RESPONSES TO PHQ QUESTIONS 1-9: 16

## 2024-02-07 ASSESSMENT — SLEEP AND FATIGUE QUESTIONNAIRES
HOW LIKELY ARE YOU TO NOD OFF OR FALL ASLEEP WHILE SITTING AND READING: MODERATE CHANCE OF DOZING
HOW LIKELY ARE YOU TO NOD OFF OR FALL ASLEEP WHILE WATCHING TV: HIGH CHANCE OF DOZING
HOW LIKELY ARE YOU TO NOD OFF OR FALL ASLEEP WHILE SITTING AND TALKING TO SOMEONE: SLIGHT CHANCE OF DOZING
HOW LIKELY ARE YOU TO NOD OFF OR FALL ASLEEP WHILE LYING DOWN TO REST IN THE AFTERNOON WHEN CIRCUMSTANCES PERMIT: HIGH CHANCE OF DOZING
HOW LIKELY ARE YOU TO NOD OFF OR FALL ASLEEP WHILE SITTING QUIETLY AFTER LUNCH WITHOUT ALCOHOL: MODERATE CHANCE OF DOZING
HOW LIKELY ARE YOU TO NOD OFF OR FALL ASLEEP IN A CAR, WHILE STOPPED FOR A FEW MINUTES IN TRAFFIC: SLIGHT CHANCE OF DOZING
HOW LIKELY ARE YOU TO NOD OFF OR FALL ASLEEP WHILE SITTING INACTIVE IN A PUBLIC PLACE: HIGH CHANCE OF DOZING
HOW LIKELY ARE YOU TO NOD OFF OR FALL ASLEEP WHEN YOU ARE A PASSENGER IN A CAR FOR AN HOUR WITHOUT A BREAK: HIGH CHANCE OF DOZING

## 2024-02-07 ASSESSMENT — PAIN SCALES - GENERAL: PAINLEVEL: SEVERE PAIN (6)

## 2024-02-07 NOTE — PATIENT INSTRUCTIONS
"Try to cut back on all caffeine containing beverages --limit to one in the morning  Keep working on weight loss    .          MY TREATMENT INFORMATION FOR SLEEP APNEA-  Randi Cleary    DOCTOR : Sharmila Gregory MD    Am I having a sleep study at a sleep center?  --->Due to normal delays, you will be contacted within 2-4 weeks to schedule    Frequently asked questions:  1. What is Obstructive Sleep Apnea (KYAW)? KYAW is the most common type of sleep apnea. Apnea means, \"without breath.\"  Apnea is most often caused by narrowing or collapse of the upper airway as muscles relax during sleep.   Almost everyone has occasional apneas. Most people with sleep apnea have had brief interruptions at night frequently for many years.  The severity of sleep apnea is related to how frequent and severe the events are.   2. What are the consequences of KYAW? Symptoms include: feeling sleepy during the day, snoring loudly, gasping or stopping of breathing, trouble sleeping, and occasionally morning headaches or heartburn at night.  Sleepiness can be serious and even increase the risk of falling asleep while driving. Other health consequences may include development of high blood pressure and other cardiovascular disease in persons who are susceptible. Untreated KYAW  can contribute to heart disease, stroke and diabetes.   3. What are the treatment options? In most situations, sleep apnea is a lifelong disease that must be managed with daily therapy. Medications are not effective for sleep apnea and surgery is generally not considered until other therapies have been tried. Your treatment is your choice . Continuous Positive Airway (CPAP) works right away and is the therapy that is effective in nearly everyone. An oral device to hold your jaw forward is usually the next most reliable option. Other options include postioning devices (to keep you off your back), weight loss, and surgery including a tongue pacing device. There is " more detail about some of these options below.  4. Are my sleep studies covered by insurance? Although we will request verification of coverage, we advise you also check in advance of the study to ensure there is coverage.    Important tips for those choosing CPAP and similar devices  For new devices, sign up for device CIARA to monitor your device for your followup visits  We encourage you to utilize the [employer] ciara or website ([employer] web (resmed.com) ) to monitor your therapy progress and share the data with your healthcare team when you discuss your sleep apnea.                                                    Know your equipment:  CPAP is continuous positive airway pressure that prevents obstructive sleep apnea by keeping the throat from collapsing while you are sleeping. In most cases, the device is  smart  and can slowly self-adjusts if your throat collapses and keeps a record every day of how well you are treated-this information is available to you and your care team.  BPAP is bilevel positive airway pressure that keeps your throat open and also assists each breath with a pressure boost to maintain adequate breathing.  Special kinds of BPAP are used in patients who have inadequate breathing from lung or heart disease. In most cases, the device is  smart  and can slowly self-adjusts to assist breathing. Like CPAP, the device keeps a record of how well you are treated.  Your mask is your connection to the device. You get to choose what feels most comfortable and the staff will help to make sure if fits. Here: are some examples of the different masks that are available: Magnetic mask aids may assist with use but there are safety issues that should be addressed when considering with magnets* ( see end of discussion).       Key points to remember on your journey with sleep apnea:  Sleep study.  PAP devices often need to be adjusted during a sleep study to show that they are effective and adjusted  right.  Good tips to remember: Try wearing just the mask during a quiet time during the day so your body adapts to wearing it. A humidifier is recommended for comfort in most cases to prevent drying of your nose and throat. Allergy medication from your provider may help you if you are having nasal congestion.  Getting settled-in. It takes more than one night for most of us to get used to wearing a mask. Try wearing just the mask during a quiet time during the day so your body adapts to wearing it. A humidifier is recommended for comfort in most cases. Our team will work with you carefully on the first day and will be in contact within 4 days and again at 2 and 4 weeks for advice and remote device adjustments. Your therapy is evaluated by the device each day.   Use it every night. The more you are able to sleep naturally for 7-8 hours, the more likely you will have good sleep and to prevent health risks or symptoms from sleep apnea. Even if you use it 4 hours it helps. Occasionally all of us are unable to use a medical therapy, in sleep apnea, it is not dangerous to miss one night.   Communicate. Call our skilled team on the number provided on the first day if your visit for problems that make it difficult to wear the device. Over 2 out of 3 patients can learn to wear the device long-term with help from our team. Remember to call our team or your sleep providers if you are unable to wear the device as we may have other solutions for those who cannot adapt to mask CPAP therapy. It is recommended that you sleep your sleep provider within the first 3 months and yearly after that if you are not having problems.   Use it for your health. We encourage use of CPAP masks during daytime quiet periods to allow your face and brain to adapt to the sensation of CPAP so that it will be a more natural sensation to awaken to at night or during naps. This can be very useful during the first few weeks or months of adapting to CPAP  though it does not help medically to wear CPAP during wakefulness and  should not be used as a strategy just to meet guidelines.  Take care of your equipment. Make sure you clean your mask and tubing using directions every day and that your filter and mask are replaced as recommended or if they are not working.     *Masks with magnets:  Updated Contraindications  Masks with magnetic components are contraindicated for use by patients where they, or anyone in close physical contact while using the mask, have the following:   Active medical implants that interact with magnets (i.e., pacemakers, implantable cardioverter defibrillators (ICD), neurostimulators, cerebrospinal fluid (CSF) shunts, insulin/infusion pumps)   Metallic implants/objects containing ferromagnetic material (i.e., aneurysm clips/flow disruption devices, embolic coils, stents, valves, electrodes, implants to restore hearing or balance with implanted magnets, ocular implants, metallic splinters in the eye)  Updated Warning  Keep the mask magnets at a safe distance of at least 6 inches (150 mm) away from implants or medical devices that may be adversely affected by magnetic interference. This warning applies to you or anyone in close physical contact with your mask. The magnets are in the frame and lower headgear clips, with a magnetic field strength of up to 400mT. When worn, they connect to secure the mask but may inadvertently detach while asleep.  Implants/medical devices, including those listed within contraindications, may be adversely affected if they change function under external magnetic fields or contain ferromagnetic materials that attract/repel to magnetic fields (some metallic implants, e.g., contact lenses with metal, dental implants, metallic cranial plates, screws, ericka hole covers, and bone substitute devices). Consult your physician and  of your implant / other medical device for information on the potential adverse  effects of magnetic fields.    BESIDES CPAP, WHAT OTHER THERAPIES ARE THERE?    Positioning Device  Positioning devices are generally used when sleep apnea is mild and only occurs on your back.This example shows a pillow that straps around the waist. It may be appropriate for those whose sleep study shows milder sleep apnea that occurs primarily when lying flat on one's back. Preliminary studies have shown benefit but effectiveness at home may need to be verified by a home sleep test. These devices are generally not covered by medical insurance.  Examples of devices that maintain sleeping on the back to prevent snoring and mild sleep apnea.    Belt type body positioner  http://[employer]/    Electronic reminder  http://nightshifttherapy.com/            Oral Appliance  What is oral appliance therapy?  An oral appliance device fits on your teeth at night like a retainer used after having braces. The device is made by a specialized dentist and requires several visits over 1-2 months before a manufactured device is made to fit your teeth and is adjusted to prevent your sleep apnea. Once an oral device is working properly, snoring should be improved. A home sleep test may be recommended at that time if to determine whether the sleep apnea is adequately treated.       Some things to remember:  -Oral devices are often, but not always, covered by your medical insurance. Be sure to check with your insurance provider.   -If you are referred for oral therapy, you will be given a list of specialized dentists to consider or you may choose to visit the Web site of the American Academy of Dental Sleep Medicine  -Oral devices are less likely to work if you have severe sleep apnea or are extremely overweight.     More detailed information  An oral appliance is a small acrylic device that fits over the upper and lower teeth  (similar to a retainer or a mouth guard). This device slightly moves jaw forward, which moves the base of  the tongue forward, opens the airway, improves breathing for effective treat snoring and obstructive sleep apnea in perhaps 7 out of 10 people .  The best working devices are custom-made by a dental device  after a mold is made of the teeth 1, 2, 3.  When is an oral appliance indicated?  Oral appliance therapy is recommended as a first-line treatment for patients with primary snoring, mild sleep apnea, and for patients with moderate sleep apnea who prefer appliance therapy to use of CPAP4, 5. Severity of sleep apnea is determined by sleep testing and is based on the number of respiratory events per hour of sleep.   How successful is oral appliance therapy?  The success rate of oral appliance therapy in patients with mild sleep apnea is 75-80% while in patients with moderate sleep apnea it is 50-70%. The chance of success in patients with severe sleep apnea is 40-50%. The research also shows that oral appliances have a beneficial effect on the cardiovascular health of KYAW patients at the same magnitude as CPAP therapy7.  Oral appliances should be a second-line treatment in cases of severe sleep apnea, but if not completely successful then a combination therapy utilizing CPAP plus oral appliance therapy may be effective. Oral appliances tend to be effective in a broad range of patients although studies show that the patients who have the highest success are females, younger patients, those with milder disease, and less severe obesity. 3, 6.   Finding a dentist that practices dental sleep medicine  Specific training is available through the American Academy of Dental Sleep Medicine for dentists interested in working in the field of sleep. To find a dentist who is educated in the field of sleep and the use of oral appliances, near you, visit the Web site of the American Academy of Dental Sleep Medicine.    References  1. Rosalinda et al. Objectively measured vs self-reported compliance during oral  appliance therapy for sleep-disordered breathing. Chest 2013; 144(5): 8012-3018.  2. Kimani, et al. Objective measurement of compliance during oral appliance therapy for sleep-disordered breathing. Thorax 2013; 68(1): 91-96.  3. Rosina et al. Mandibular advancement devices in 620 men and women with KYAW and snoring: tolerability and predictors of treatment success. Chest 2004; 125: 0105-2123.  4. Srinivas, et al. Oral appliances for snoring and KYAW: a review. Sleep 2006; 29: 244-262.  5. Angeline et al. Oral appliance treatment for KYAW: an update. J Clin Sleep Med 2014; 10(2): 215-227.  6. Pedro et al. Predictors of OSAH treatment outcome. J Dent Res 2007; 86: 0908-5258.      Weight Loss:    Weight loss is a long-term strategy that may improve sleep apnea in some patients.    Weight management is a personal decision and the decision should be based on your interest and the potential benefits.  If you are interested in exploring weight loss strategies, the following discussion covers the impact on weight loss on sleep apnea and the approaches that may be successful.    Being overweight does not necessarily mean you will have health consequences.  Those who have BMI over 35 or over 27 with existing medical conditions carries greater risk.   Weight loss decreases severity of sleep apnea in most people with obesity. For those with mild obesity who have developed snoring with weight gain, even 15-30 pound weight loss can improve and occasionally eliminate sleep apnea.  Structured and life-long dietary and health habits are necessary to lose weight and keep healthier weight levels.     Though there may be significant health benefits from weight loss, long-term weight loss is very difficult to achieve- studies show success with dietary management in less than 10% of people. In addition, substantial weight loss may require years of dietary control and may be difficult if patients have severe obesity. In these  cases, surgical management may be considered.  Finally, older individuals who have tolerated obesity without health complications may be less likely to benefit from weight loss strategies.      Body mass index is 33.09 kg/m .    Surgery:    Surgery for obstructive sleep apnea is considered generally only when other therapies fail to work. Surgery may be discussed with you if you are having a difficult time tolerating CPAP and or when there is an abnormal structure that requires surgical correction.  Nose and throat surgeries often enlarge the airway to prevent collapse.  Most of these surgeries create pain for 1-2 weeks and up to half of the most common surgeries are not effective throughout life.  You should carefully discuss the benefits and drawbacks to surgery with your sleep provider and surgeon to determine if it is the best solution for you.   More information  Surgery for KYAW is directed at areas that are responsible for narrowing or complete obstruction of the airway during sleep.  There are a wide range of procedures available to enlarge and/or stabilize the airway to prevent blockage of breathing in the three major areas where it can occur: the palate, tongue, and nasal regions.  Successful surgical treatment depends on the accurate identification of the factors responsible for obstructive sleep apnea in each person.  A personalized approach is required because there is no single treatment that works well for everyone.  Because of anatomic variation, consultation with an examination by a sleep surgeon is a critical first step in determining what surgical options are best for each patient.  In some cases, examination during sedation may be recommended in order to guide the selection of procedures.  Patients will be counseled about risks and benefits as well as the typical recovery course after surgery. Surgery is typically not a cure for a person s KYAW.  However, surgery will often significantly improve  one s KYAW severity (termed  success rate ).  Even in the absence of a cure, surgery will decrease the cardiovascular risk associated with OSA7; improve overall quality of life8 (sleepiness, functionality, sleep quality, etc).      Palate Procedures:  Patients with KYAW often have narrowing of their airway in the region of their tonsils and uvula.  The goals of palate procedures are to widen the airway in this region as well as to help the tissues resist collapse.  Modern palate procedure techniques focus on tissue conservation and soft tissue rearrangement, rather than tissue removal.  Often the uvula is preserved in this procedure. Residual sleep apnea is common in patient after pharyngoplasty with an average reduction in sleep apnea events of 33%2.      Tongue Procedures:  ExamWhile patients are awake, the muscles that surround the throat are active and keep this region open for breathing. These muscles relax during sleep, allowing the tongue and other structures to collapse and block breathing.  There are several different tongue procedures available.  Selection of a tongue base procedure depends on characteristics seen on physical exam.  Generally, procedures are aimed at removing bulky tissues in this area or preventing the back of the tongue from falling back during sleep.  Success rates for tongue surgery range from 50-62%3.    Hypoglossal Nerve Stimulation:  Hypoglossal nerve stimulation has recently received approval from the [place] Food and Drug Administration for the treatment of obstructive sleep apnea.  This is based on research showing that the system was safe and effective in treating sleep apnea6.  Results showed that the median AHI score decreased 68%, from 29.3 to 9.0. This therapy uses an implant system that senses breathing patterns and delivers mild stimulation to airway muscles, which keeps the airway open during sleep.  The system consists of three fully implanted components: a small  generator (similar in size to a pacemaker), a breathing sensor, and a stimulation lead.  Using a small handheld remote, a patient turns the therapy on before bed and off upon awakening.    Candidates for this device must be greater than 18 years of age, have moderate to severe obstructive sleep apnea with less than 25% central events  (AHI between 15-65), BMI less than 35, have tried CPAP/oral appliance for at least 8 weeks without success, and have appropriate upper airway anatomy (determined by a sleep endoscopy performed by Dr. Mega Busch or Dr. Cuate Pa).    Nasal Procedures:  Nasal obstruction can interfere with nasal breathing during the day and night.  Studies have shown that relief of nasal obstruction can improve the ability of some patients to tolerate positive airway pressure therapy for obstructive sleep apnea1.  Treatment options include medications such as nasal saline, topical corticosteroid and antihistamine sprays, and oral medications such as antihistamines or decongestants. Non-surgical treatments can include external nasal dilators for selected patients. If these are not successful by themselves, surgery can improve the nasal airway either alone or in combination with these other options.        Combination Procedures:  Combination of surgical procedures and other treatments may be recommended, particularly if patients have more than one area of narrowing or persistent positional disease.  The success rate of combination surgery ranges from 66-80%2,3.    References  Petey CORNEJO. The Role of the Nose in Snoring and Obstructive Sleep Apnoea: An Update.  Eur Arch Otorhinolaryngol. 2011; 268: 1365-73.   Arthur SM; Juma JA; Anika JR; Pallanch JF; Tin MB; Earl SG; Sam RODRIGUEZ. Surgical modifications of the upper airway for obstructive sleep apnea in adults: a systematic review and meta-analysis. SLEEP 2010;33(10):5681-7084. Nicki MARINELLI. Hypopharyngeal surgery in obstructive sleep apnea: an  evidence-based medicine review.  Arch Otolaryngol Head Neck Surg. 2006 Feb;132(2):206-13.  Theo YH1, Levi Y, Yevgeniy CARMENZA. The efficacy of anatomically based multilevel surgery for obstructive sleep apnea. Otolaryngol Head Neck Surg. 2003 Oct;129(4):327-35.  Nicki MARINELLI, Goldberg A. Hypopharyngeal Surgery in Obstructive Sleep Apnea: An Evidence-Based Medicine Review. Arch Otolaryngol Head Neck Surg. 2006 Feb;132(2):206-13.  Soco DECKER et al. Upper-Airway Stimulation for Obstructive Sleep Apnea.  N Engl J Med. 2014 Jan 9;370(2):139-49.  Kerrie Y et al. Increased Incidence of Cardiovascular Disease in Middle-aged Men with Obstructive Sleep Apnea. Am J Respir Crit Care Med; 2002 166: 159-165  Babcocksoraya ANDREWS et al. Studying Life Effects and Effectiveness of Palatopharyngoplasty (SLEEP) study: Subjective Outcomes of Isolated Uvulopalatopharyngoplasty. Otolaryngol Head Neck Surg. 2011; 144: 623-631.        WHAT IF I ONLY HAVE SNORING?    Mandibular advancement devices, lateral sleep positioning, long-term weight loss and treatment of nasal allergies have been shown to improve snoring.  Exercising tongue muscles with a game (https://[employer]/us/ciara/Koinify-reduce-snoring/ug0499438286) or stimulating the tongue during the day with a device (https://doi.org/10.3390/jgc98438883) have improved snoring in some individuals.  https://www.[employer]/  https://www.sleepfoundation.org/best-anti-snoring-mouthpieces-and-mouthguards    Remember to Drive Safe... Drive Alive     Sleep health profoundly affects your health, mood, and your safety.  Thirty three percent of the population (one in three of us) is not getting enough sleep and many have a sleep disorder. Not getting enough sleep or having an untreated / undertreated sleep condition may make us sleepy without even knowing it. In fact, our driving could be dramatically impaired due to our sleep health. As your provider, here are some things I would like you to know about  driving:     Here are some warning signs for impairment and dangerous drowsy driving:              -Having been awake more than 16 hours               -Looking tired               -Eyelid drooping              -Head nodding (it could be too late at this point)              -Driving for more than 30 minutes     Some things you could do to make the driving safer if you are experiencing some drowsiness:              -Stop driving and rest              -Call for transportation              -Make sure your sleep disorder is adequately treated     Some things that have been shown NOT to work when experiencing drowsiness while driving:              -Turning on the radio              -Opening windows              -Eating any  distracting  /  entertaining  foods (e.g., sunflower seeds, candy, or any other)              -Talking on the phone      Your decision may not only impact your life, but also the life of others. Please, remember to drive safe for yourself and all of us.

## 2024-02-07 NOTE — NURSING NOTE
Is the patient currently in the state of [place]? YES    Visit mode:VIDEO    If the visit is dropped, the patient can be reconnected by: VIDEO VISIT: Send to e-mail at: [email or web address]    Will anyone else be joining the visit? NO  (If patient encounters technical issues they should call [phone]    How would you like to obtain your AVS? MyChart    Are changes needed to the allergy or medication list? Pt declined allergy review and Pt stated no med changes    Reason for visit: RECHECK  Has patient had flu shot for current/most recent flu season? If so, when? No  Depression Response    Patient completed the PHQ-9 assessment for depression and scored >9? Yes-16  Question 9 on the PHQ-9 was positive for suicidality? No  Does patient have current mental health provider? Yes    Is this a virtual visit? Yes   Does patient have suicidal ideation (positive question 9)? No - offer to place Mental Health Referral.  Patient declined referral/not needed    I personally notified the following: visit provider           Kimberly KURTZ

## 2024-02-07 NOTE — PROGRESS NOTES
Virtual Visit Details    Type of service:  Video Visit   Video Start Time: 11:36 AM  Video End Time:12:04 PM    Originating Location (pt. Location): Home    Distant Location (provider location):  Off-site  Platform used for Video Visit: Tara    Chief complaint: Was recommended to restart CPAP    History of Present Illness: 68-year-old female with history of mood disorder, treatment complicated by serotonin syndrome now no longer on medications, restless legs on ropinirole, obstructive sleep apnea with some associated hypoxemia currently not treated.  She had difficulty tolerating CPAP in the past for various reasons.  She recalls frequent urinary tract infections, poor management of her mood disorder with frequent panic attacks, and need to get up frequently at night to use the bathroom.  She is incontinent and wears pads but still needs to get up multiple times.  She is been told to restart CPAP therapy to help manage her hemoglobin-she does have a history of hemochromatosis.      She is actively working on weight loss and his last about 20 to 25 pounds since November.  She is about 75 pounds down from her peak of 280.  She is currently sleeping an adjustable bed with the head of the bed elevated about 15 degrees.  She does drink caffeinated beverages 2-3 Pepsi's a day, occasionally 2 cups of coffee.  She continues to take chronic opioids.  She reports that these also help with her restless legs.  She also takes medical cannabis including before bed.  She is starting to work with mental health provider regarding nonmedication based therapies for her mental health.    [place] Sleepiness Scale  Total score - [place]: 18 (2/7/2024  7:14 AM)   (Less than 10 normal)    Insomnia Severity Scale  TEVIN Total Score: 22  (normal 0-7, mild 8-14, moderate 15-21, severe 22-28)    Past Medical History:   Diagnosis Date    Bipolar 2 disorder (H)     Breast cancer (H) 1986    lumpectomy, radiation, chemo    Chronic pain syndrome      COPD (chronic obstructive pulmonary disease) (H)     asthma    Cord compression (H) 12/21/2021    Dizzy     Drug tolerance     opioid    Esophageal reflux     Fatigue     Generalized anxiety disorder     Graves disease 1994    Hemochromatosis 02/14/2018    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 Created by Conversion  Replacement Utility updated for latest IMO load Formatting of this note might be different from the original. Created by Conversion  Replacement Utility updated for latest IMO load    History of corticosteroid therapy 11/19/2019    History of partial adrenalectomy (H24) 11/19/2019    History of pheochromocytoma 11/19/2019    Hx antineoplastic chemotherapy     Hx of radiation therapy     Hyperlipidaemia     Hypertension     Impaired fasting glucose 2017    Injury of neck, whiplash 07/15/2021    Joint pain     KYAW (obstructive sleep apnea) 2016    Osteopenia     Pheochromocytoma, left 08/02/2017    laparoscopically removed    Postablative hypothyroidism 1995    Prediabetes 10/03/2019    by A1c    Psoriasis     Psoriatic arthropathy (H)     Right rotator cuff tear     RLS (restless legs syndrome)     on ropinorole    Sacroiliitis (H24)     Serotonin syndrome 08/28/2020    [facility] - While on desvenlafaxine 100mg    Snoring     Spinal stenosis     Status post coronary angiogram 10/03/2019    Urinary incontinence     Vitamin B 12 deficiency 2009    Vitamin D deficiency 2010       Allergies   Allergen Reactions    Serotonin Reuptake Inhibitors (Ssris) Anxiety, Difficulty breathing, Headache, Palpitations and Shortness Of Breath    Buspirone      The patient states she had serotonin syndrome    Cephalexin      Other reaction(s): unknown rxn.    Desvenlafaxine      Serotonin syndrome    Diclofenac Sodium [Diclofenac]      Serotonin syndrome and restless legs syndrome    Gabapentin      [place]rove on the  "wrong side of the highway    Levofloxacin      \"CAN'T REMEMBER\"    Penicillins      \"SORES IN MOUTH\"    Riluzole Difficulty breathing and Swelling    Sulfa Antibiotics      \"PT DOES NOT KNOW WHAT THE REACTION WAS\"    Topiramate Other (See Comments)     Frequent urination       Current Outpatient Medications   Medication    albuterol (VENTOLIN HFA) 108 (90 Base) MCG/ACT inhaler    allopurinol (ZYLOPRIM) 300 MG tablet    benzonatate (TESSALON) 200 MG capsule    Cyanocobalamin (VITAMIN B-12) 5000 MCG SUBL    ethacrynic acid (EDECRIN) 25 MG tablet    Fluticasone-Umeclidin-Vilanterol (TRELEGY ELLIPTA) 200-62.5-25 MCG/ACT oral inhaler    guaiFENesin (MUCINEX) 600 MG 12 hr tablet    KLOR-CON 20 MEQ CR tablet    lamoTRIgine (LAMICTAL) 25 MG tablet    MAGNESIUM CITRATE PO    medical cannabis (Patient's own supply)    naloxone (NARCAN) 4 MG/0.1ML nasal spray    omeprazole (PRILOSEC) 20 MG DR capsule    ondansetron (ZOFRAN ODT) 4 MG ODT tab    oxyCODONE (ROXICODONE) 5 MG tablet    rOPINIRole (REQUIP) 2 MG tablet    STATIN NOT PRESCRIBED (INTENTIONAL)    SYNTHROID 150 MCG tablet    vitamin C (ASCORBIC ACID) 1000 MG TABS    vitamin D3 (CHOLECALCIFEROL) 250 mcg (56113 units) capsule     Current Facility-Administered Medications   Medication    cyanocobalamin injection 1,000 mcg       Social History     Socioeconomic History    Marital status:      Spouse name: Not on file    Number of children: Not on file    Years of education: Not on file    Highest education level: Not on file   Occupational History    Not on file   Tobacco Use    Smoking status: Former     Packs/day: 2.50     Years: 20.00     Additional pack years: 0.00     Total pack years: 50.00     Types: Cigarettes     Start date: 1971     Quit date: 2000     Years since quittin.6     Passive exposure: Past    Smokeless tobacco: Never   Vaping Use    Vaping Use: Never used   Substance and Sexual Activity    Alcohol use: Not Currently     Comment: " relapse 09/2021 sober     Drug use: Yes     Types: Marijuana    Sexual activity: Not on file   Other Topics Concern    Parent/sibling w/ CABG, MI or angioplasty before 65F 55M? Yes   Social History Narrative    Not on file     Social Determinants of Health     Financial Resource Strain: High Risk (12/20/2023)    Financial Resource Strain     Within the past 12 months, have you or your family members you live with been unable to get utilities (heat, electricity) when it was really needed?: Yes   Food Insecurity: High Risk (12/20/2023)    Food Insecurity     Within the past 12 months, did you worry that your food would run out before you got money to buy more?: Yes     Within the past 12 months, did the food you bought just not last and you didn t have money to get more?: Yes   Transportation Needs: High Risk (12/20/2023)    Transportation Needs     Within the past 12 months, has lack of transportation kept you from medical appointments, getting your medicines, non-medical meetings or appointments, work, or from getting things that you need?: Yes   Physical Activity: Not on file   Stress: Not on file   Social Connections: Not on file   Interpersonal Safety: Low Risk  (9/25/2023)    Interpersonal Safety     Do you feel physically and emotionally safe where you currently live?: Yes     Within the past 12 months, have you been hit, slapped, kicked or otherwise physically hurt by someone?: No     Within the past 12 months, have you been humiliated or emotionally abused in other ways by your partner or ex-partner?: No   Housing Stability: High Risk (12/20/2023)    Housing Stability     Do you have housing? : Yes     Are you worried about losing your housing?: Yes       Family History   Problem Relation Age of Onset    Lupus Sister     Hypertension Sister     Obesity Sister     Scleroderma Sister     Heart Failure Sister     Hemochromatosis Sister     Hemochromatosis Brother     Hypertension Brother     Alcoholism Brother   "   Substance Abuse Brother     Hemochromatosis Father     Myocardial Infarction Father     Snoring Father     Heart Disease Father     Obesity Father     Coronary Artery Disease Father          at age 53 of heart attack    Hypertension Father     Genetic Disorder Father         Hemachromatosis    Obesity Mother     Thyroid Disease Mother     Arthritis Mother     Hypertension Mother     Osteoporosis Mother     Mental Illness Maternal Uncle     Alcoholism Maternal Grandfather     Obesity Sister     Cardiovascular Sister     Hypertension Sister     Arthritis Sister     Hemochromatosis Sister     Lupus Sister     Scleroderma Sister     Coronary Artery Disease Sister     Genetic Disorder Sister         Lupus, Hemachromatosis    Cardiovascular Brother     Hypertension Brother     Arthritis Brother     Hemochromatosis Brother     Prostate Cancer Brother     Genetic Disorder Brother         Hemachromatosis    Obesity Brother     Cardiovascular Maternal Grandmother     Coronary Artery Disease Maternal Grandmother     Osteoporosis Maternal Grandmother     Cerebrovascular Disease Paternal Grandmother     Adrenal Disorder No family hx of     Breast Cancer No family hx of            EXAM:  Ht 1.676 m (5' 6\")   Wt 93 kg (205 lb)   LMP  (LMP Unknown)   BMI 33.09 kg/m    GENERAL: Alert and no distress  EYES: Eyes grossly normal to inspection.  No discharge or erythema, or obvious scleral/conjunctival abnormalities.  RESP: No audible wheeze, cough, or visible cyanosis.    SKIN: Visible skin clear. No significant rash, abnormal pigmentation or lesions.  NEURO: Cranial nerves grossly intact.  Mentation and speech appropriate for age.  PSYCH: Appropriate affect, tone, and pace of words         PSG 2017 [facility]  Weight 233 lbs, BMI 37.4  ESS 15  AHI 36.9 with associated hypoxemia  CPAP titrated from 5-11, CPAP pressures higher than 7 were effective at eliminating obstructive respiratory events but patient had borderline " low oxygen saturations and low tidal volumes.  Bilevel trial initiated but was associated with central apneas  PLM's noted        TSH   Date Value Ref Range Status   12/29/2023 0.34 0.30 - 4.20 uIU/mL Final   02/23/2022 1.14 0.30 - 5.00 uIU/mL Final   05/18/2021 1.31 0.40 - 4.00 mU/L Final         ASSESSMENT:  68-year-old female with complex past medical history including hemochromatosis, serotonin syndrome, active mood disorder, restless legs, obstructive sleep apnea and hypoxemia, obesity.  She is interested in restarting CPAP therapy.  I am concerned about her ability to have success given that she continues to suffer panic attacks and nocturia.    PLAN:  Recommended reassessment of underlying sleep apnea given her significant weight loss.  She is agreeable to this.  Patient should provide her updated weight to the sleep technician when she does the study.  Continue to sleep with the head of the bed elevated.  Strongly urged her to cut back on caffeinated beverages as these can aggravate her insomnia issues as well as her motor restlessness.  Continue to work on managing mood disorder as having her panic attacks under better control may help her with what ever treatments for sleep apnea she ends up trying.  She is agreeable with this plan.      35 minutes spent by me on the date of the encounter doing chart review, history and exam, documentation and further activities per the note    Sharmila Gregory M.D.  Pulmonary/Critical Care/Sleep Medicine    [facility]   Floor 1, [address]   [address] [place], [place] [address] [address]   Appointments: [phone]    The above note was dictated using voice recognition software and may include typographical errors. Please contact the author for any clarifications.

## 2024-02-08 ENCOUNTER — TELEPHONE (OUTPATIENT)
Dept: CARDIOLOGY | Facility: CLINIC | Age: 69
End: 2024-02-08
Payer: MEDICARE

## 2024-02-08 ENCOUNTER — VIRTUAL VISIT (OUTPATIENT)
Dept: PSYCHOLOGY | Facility: CLINIC | Age: 69
End: 2024-02-08
Payer: MEDICARE

## 2024-02-08 DIAGNOSIS — F43.9 TRAUMA AND STRESSOR-RELATED DISORDER: ICD-10-CM

## 2024-02-08 DIAGNOSIS — F31.81 BIPOLAR 2 DISORDER (H): Primary | ICD-10-CM

## 2024-02-08 DIAGNOSIS — F41.1 GENERALIZED ANXIETY DISORDER: ICD-10-CM

## 2024-02-08 PROCEDURE — 90834 PSYTX W PT 45 MINUTES: CPT | Mod: 95 | Performed by: SOCIAL WORKER

## 2024-02-08 NOTE — TELEPHONE ENCOUNTER
2/8 spoke to pt and attempted to schedule a nurse follow up virtually after the [facility] for 3/7 elsy/ Leena   Pt had a melt down about getting calls back to back with appts being scheduled and has no break at all since she has to be in clinic every other day   Pt didn't want to schedule at the moment and was crying about this situation which was overwhelming her with no free days or months   Adv her that I understand and consoled her   Pt stated that she'll call back and had another  on the other line and couldn't take it all at once and stated that once she calms down she'll call back to schedule  Adv noted

## 2024-02-08 NOTE — PROGRESS NOTES
[facility]                                     Progress Note    Patient Name: Randi Cleary  Date: 2/8/24         Service Type: Individual     Session Start Time: 9:32 AM Session End Time: 10:17 AM     Session Length: 45 minutes    Session #: 233    Attendees: Client attended alone    Service Modality:  Video Visit:      Provider verified identity through the following two step process.  Patient provided:  Patient is known previously to provider    Telemedicine Visit: The patient's condition can be safely assessed and treated via synchronous audio and visual telemedicine encounter.      Reason for Telemedicine Visit: Patient convenience (e.g. access to timely appointments / distance to available provider)    Originating Site (Patient Location): Patient's home    Distant Site (Provider Location): [facility] [place]    Consent:  The patient/guardian has verbally consented to: the potential risks and benefits of telemedicine (video visit) versus in person care; bill my insurance or make self-payment for services provided; and responsibility for payment of non-covered services.     Patient would like the video invitation sent by:  My Chart    Mode of Communication:  Video Conference via [facility]    Distant Location (Provider):  On-site    As the provider I attest to compliance with applicable laws and regulations related to telemedicine.       DATA  Extended Session (53+ minutes): No  Interactive Complexity: No  Crisis: No        Progress Since Last Session (Related to Symptoms / Goals / Homework):   Symptoms:  Patient reported feeling overwhelmed at what her next steps are for her medical and mental health journey.       Homework:  Progress made    Continue looking at records  Look at taking one thing at the time  Continue to process past     Episode of Care Goals: Satisfactory progress - ACTION (Actively working towards change); Intervened by  reinforcing change plan / affirming steps taken     Current / Ongoing Stressors and Concerns:   Patient is currently socially isolated. She has a conflictual relationship with her .  She is getting minimal physical activity.  She has had several surgeries.      Treatment Objective(s) Addressed in This Session:   Patient will increase frequency of engaging her in ADLs.  Patient will track and record at least 5 pleasant exchanges with . Patient will be able to identify at least 5 positive traits about her .  Patient will reduce level of depressive and anxious features as evidenced by reduction in score on her CHAVO-7 and PHQ-9 (scores of 15 and 16 at first measurment, respectively).     Intervention:   Supportive Therapy:  Talked about health concerns and how to prioritize these things. Looked at how she now has a better plan for her overall health. Helped patient review and organize information and explore how to make a decision.    The following assessments were completed by patient for this visit:  PHQ9:       12/18/2023     3:01 PM 12/19/2023     3:50 PM 12/29/2023    12:48 PM 1/15/2024    11:57 AM 1/22/2024    11:59 AM 1/28/2024    10:59 PM 2/7/2024    11:08 AM   PHQ-9 SCORE   PHQ-9 Total Score MyChart 19 (Moderately severe depression)   17 (Moderately severe depression) 15 (Moderately severe depression) 19 (Moderately severe depression)    PHQ-9 Total Score 19 16 20 17 15 19 16     GAD7:       9/20/2023     9:59 AM 10/9/2023     8:55 AM 10/31/2023     4:25 AM 12/1/2023    11:37 AM 12/18/2023     3:09 PM 1/15/2024    12:02 PM 1/29/2024     3:10 AM   CHAVO-7 SCORE   Total Score 16 (severe anxiety) 15 (severe anxiety) 15 (severe anxiety) 14 (moderate anxiety) 16 (severe anxiety) 14 (moderate anxiety) 16 (severe anxiety)   Total Score 16    16    16 15 15 14 16 14 16     PROMIS 10-Global Health (only subscores and total score):       7/21/2023    10:36 AM 7/28/2023     3:24 PM 8/7/2023     1:07 PM  9/20/2023    10:13 AM 10/31/2023     4:36 AM 12/1/2023    11:52 AM 1/22/2024    12:00 PM   PROMIS-10 Scores Only   Global Mental Health Score 5    5  6    6     5    5 6  5   Global Physical Health Score 7    7  8    8     9    9 8  9   PROMIS TOTAL - SUBSCORES 12    12  14    14     14    14 14  14       Information is confidential and restricted. Go to Review Flowsheets to unlock data.    Multiple values from one day are sorted in reverse-chronological order        ASSESSMENT: Current Emotional / Mental Status (status of significant symptoms):   Risk status (Self / Other harm or suicidal ideation)   Patient denies current fears or concerns for personal safety.   Patient denies current or recent suicidal ideation or behaviors.   Patient denies current or recent homicidal ideation or behaviors.   Patient denies current or recent self injurious behavior or ideation.   Patient denies other safety concerns.   Patient reports there has been no change in risk factors since their last session.     Patient reports there has been no change in protective factors since their last session.     A safety and risk management plan has been developed including: Patient consented to co-developed safety plan on 11/24/2020, updated 2/20/23.  Safety and risk management plan was reviewed.   Patient agreed to use safety plan should any safety concerns arise.  A copy was made available to the patient.     Appearance:   Appropriate    Eye Contact:   Good    Psychomotor Behavior: Normal    Attitude:   Cooperative    Orientation:   All   Speech    Rate / Production: Normal/ Responsive    Volume:  Normal    Mood:    Anxious    Affect:    Appropriate    Thought Content:  Clear    Thought Form:  Coherent    Insight:    Good      Medication Review:   No changes to current psychiatric medication(s)      Medication Compliance:   Yes     Changes in Health Issues:   None reported     Chemical Use Review:   Substance Use: Chemical use reviewed, no  active concerns identified Nothing used since 2021.     Tobacco Use: No current tobacco use.      Diagnosis:  1. Bipolar 2 disorder (H)    2. Generalized anxiety disorder    3. Trauma and stressor-related disorder          Collateral Reports Completed:   Not Applicable    PLAN: (Patient Tasks / Therapist Tasks / Other)  Next session prep for Vincent  Talk to dietician about calcium  Connect with Dr. Coker to start on Fosomax  Schedule dental work  Start TMS  Start women's group     Look at taking one thing at the time  Continue to process past        There has been demonstrated improvement in functioning while patient has been engaged in psychotherapy/psychological service- if withdrawn the patient would deteriorate and/or relapse.     MICAELA SLADE, [facility]   2024                                                        ______________________________________________________________________    Individual Treatment Plan    Patient's Name: Randi Cleary  YOB: 1955    Date of Creation: 20  Date Treatment Plan Last Reviewed/Revised: 23    DSM5 Diagnoses: 296.89 Bipolar II Disorder Depressed, 300.02 (F41.1) Generalized Anxiety Disorder, or Adjustment Disorders  309.89 (F43.8) Other Specified Trauma and Stressor Related Disorder  Psychosocial / Contextual Factors: Patient's entire family of origin has , she now has a sister-in-law and  as support.  Relationship with  is conflictual. She is recovering from surgeries  PROMIS (reviewed every 90 days): PROMIS-10 Scores  PROMIS 10-Global Health (only subscores and total score):   PROMIS-10 Scores Only 2021 3/15/2022 3/15/2022 3/15/2022 3/24/2022 2022 2022   Global Mental Health Score 6 6 6 6 8 12 12   Global Physical Health Score 9 9 9 9 8 11 10   PROMIS TOTAL - SUBSCORES 15 15 15 15 16 23 22       Referral / Collaboration:  Referral to another professional/service is not indicated at this  "time..    Anticipated number of session for this episode of care: 50  Anticipation frequency of session: Biweekly  Anticipated Duration of each session: 53 or more minutes due to intensity of trauma symptoms  Treatment plan will be reviewed in 90 days or when goals have been changed.   There has been demonstrated improvement in functioning while patient has been engaged in psychotherapy/psychological service- if withdrawn the patient would deteriorate and/or relapse.       MeasurableTreatment Goal(s) related to diagnosis / functional impairment(s)  Goal 1: Patient will \"jumpstart, getting going with the things I need to be doing around the house as far as picking up, doing things, trying to do something every day.  Also to lessen the animosity between me and my .\"    I will know I've met my goal when my shoulders are fixed and I can see.      Objective #A (Patient Action)    Patient will  increase frequency of engaging her in ADLs .  Status: Continued - Date(s): 12/10/21, 3/9/22, 6/9/22, 9/2/22, 12/1/22, 3/2/23, 6/6/23, 9/13/23, 12/14/23    Intervention(s)  Therapist will  engage patient in CBT, specifically behavioral activation .    Objective #B  Patient will   track and record at least 5 pleasant exchanges with . Patient will be able to identify at least 5 positive traits about her  and how he relates to her .  Status: Continued - Date(s): 12/10/21, 3/9/22, 6/9/22, 9/2/22, 12/1/22, 3/2/23, 6/6/23, 9/13/23, 12/14/23    Intervention(s)  Therapist will  teach assertiveness skills and assign homework related to relationship interactions .    Objective #C  Patient will  reduce level of depressive and anxious features as evidenced by reduction in score on her CHAVO-7 and PHQ-9 (scores of 15 and 16 at first measurment, respectively) .  Status: Continued - Date(s): 12/10/21, 3/9/22, 6/9/22, 9/2/22, 12/1/22, 3/2/23, 6/6/23, 9/13/23, 12/14/23    Intervention(s)  Therapist will  engage patient in " "person-centered therapy and CBT .    Patient has reviewed and agreed to the above plan.      CRUZ SAMUELMICAELA PAULINO JO, [facility]  December 14, 2023                                                   Randi Cleary          SAFETY PLAN:  Step 1: Warning signs / cues (Thoughts, images, mood, situation, behavior) that a crisis may be developing:  Thoughts: \"I don't want to continue\" \"I am unwanted\"  Images: none  Thinking Processes: ruminating  Mood: anger  Behaviors: isolating/withdrawing , can't stop crying, not taking care of myself and not taking care of my responsibilities  Situations: small triggers, such as not being able to find something, or dropping something   Step 2: Coping strategies - Things I can do to take my mind off of my problems without contacting another person (relaxation technique, physical activity):  Distress Tolerance Strategies:  arts and crafts: drawing, play with my pet , listen to positive and upbeat music: any, change body temperature (ice pack/cold water)  and paced breathing/progressive muscle relaxation  Physical Activities: go for a walk, deep breathing and stretching   Focus on helpful thoughts:  \"You've been through this before, you can get through it again.\"  Step 3: People and social settings that provide distraction:                 Name: Carmen                            Name: Darien                           Name: Aleida       pool, shopping, Carmen's house, [employer]       Step 4: Remind myself of people and things that are important to me and worth living for:  Clifford Little Donna, post-COVID world, options of what could be in your future        Step 5: When I am in crisis, I can ask these people to help me use my safety plan:                 Name: Sidney  Step 6: Making the environment safe:   go to sleep/daydream  Step 7: Professionals or agencies I can contact during a crisis:  [facility] Daytime Number: [phone]  Suicide Prevention Lifeline: 5-537-784-JERT " (5943)  Crisis Text Line Service (available 24 hours a day, 7 days a week): Text [place] to 125655  Local Crisis Services: [facility] Crisis: [phone]  Adults can always access to the emPATH unit at [facility] (no phone number, utilize it like an urgent care or ER where you just show up)     Call 911 or go to my nearest emergency department.       I helped develop this safety plan and agree to use it when needed.  I have been given a copy of this plan.       Client signature _________________________________________________________________  Today s date:  11/24/2020  Adapted from Safety Plan Template 2008 Randi Poole and Robby Barba is reprinted with the express permission of the authors.  No portion of the Safety Plan Template may be reproduced without the express, written permission.  You can contact the authors at [email or web address] or [email or web address].
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
## 2024-02-09 NOTE — TELEPHONE ENCOUNTER
Spoke with patient. They are requesting to wait a little bit to start TMS. They have a few other medical issues that they would like too see to first. Has follow with a provider on 2/19 and may know more at that time. Writer will follow up patient patient later that week. Patient was agreeable to the plan.

## 2024-02-12 ENCOUNTER — VIRTUAL VISIT (OUTPATIENT)
Dept: PSYCHOLOGY | Facility: CLINIC | Age: 69
End: 2024-02-12
Payer: MEDICARE

## 2024-02-12 DIAGNOSIS — F43.9 TRAUMA AND STRESSOR-RELATED DISORDER: ICD-10-CM

## 2024-02-12 DIAGNOSIS — F41.1 GENERALIZED ANXIETY DISORDER: ICD-10-CM

## 2024-02-12 DIAGNOSIS — F31.81 BIPOLAR 2 DISORDER (H): Primary | ICD-10-CM

## 2024-02-12 PROCEDURE — 90837 PSYTX W PT 60 MINUTES: CPT | Mod: 95 | Performed by: SOCIAL WORKER

## 2024-02-12 NOTE — PROGRESS NOTES
"[facility]                                     Progress Note    Patient Name: Randi Cleary  Date: 2/12/24         Service Type: Individual     Session Start Time: 12:05 PM Session End Time: 1:00 PM     Session Length: 55 minutes    Session #: 234    Attendees: Client attended alone    Service Modality:  Video Visit:      Provider verified identity through the following two step process.  Patient provided:  Patient is known previously to provider    Telemedicine Visit: The patient's condition can be safely assessed and treated via synchronous audio and visual telemedicine encounter.      Reason for Telemedicine Visit: Patient convenience (e.g. access to timely appointments / distance to available provider)    Originating Site (Patient Location): Patient's home    Distant Site (Provider Location): Provider Remote Setting- Home Office    Consent:  The patient/guardian has verbally consented to: the potential risks and benefits of telemedicine (video visit) versus in person care; bill my insurance or make self-payment for services provided; and responsibility for payment of non-covered services.     Patient would like the video invitation sent by:  My Chart    Mode of Communication:  Video Conference via [facility]    Distant Location (Provider):  Off-site    As the provider I attest to compliance with applicable laws and regulations related to telemedicine.       DATA  Extended Session (53+ minutes): No  Interactive Complexity: No  Crisis: No        Progress Since Last Session (Related to Symptoms / Goals / Homework):   Symptoms:  Patient reported she \"had it\" at the end of last week, but is now managing.       Homework:  Progress made    Next session prep for Vincent -priorities changed  Talk to dietician about calcium -not yet done  Connect with Dr. Coker to start on Fosomax -not yet done  Schedule dental work -called, didn't get through  Start TMS -pt is thinking of delaying  Start " women's group -next Tuesday    Look at taking one thing at the time  Continue to process past     Episode of Care Goals: Satisfactory progress - ACTION (Actively working towards change); Intervened by reinforcing change plan / affirming steps taken     Current / Ongoing Stressors and Concerns:   Patient is currently socially isolated. She has a conflictual relationship with her .  She is getting minimal physical activity.  She has had several surgeries.      Treatment Objective(s) Addressed in This Session:   Patient will increase frequency of engaging her in ADLs.  Patient will track and record at least 5 pleasant exchanges with . Patient will be able to identify at least 5 positive traits about her .  Patient will reduce level of depressive and anxious features as evidenced by reduction in score on her CHAVO-7 and PHQ-9 (scores of 15 and 16 at first measurment, respectively).     Intervention:   Supportive Therapy:  Reviewed homework and identified successes and barriers to completion. Discussed ongoing health concerns and managing multiple priorities. Discussed coping techniques.     The following assessments were completed by patient for this visit:  PHQ9:       12/18/2023     3:01 PM 12/19/2023     3:50 PM 12/29/2023    12:48 PM 1/15/2024    11:57 AM 1/22/2024    11:59 AM 1/28/2024    10:59 PM 2/7/2024    11:08 AM   PHQ-9 SCORE   PHQ-9 Total Score MyChart 19 (Moderately severe depression)   17 (Moderately severe depression) 15 (Moderately severe depression) 19 (Moderately severe depression)    PHQ-9 Total Score 19 16 20 17 15 19 16     GAD7:       9/20/2023     9:59 AM 10/9/2023     8:55 AM 10/31/2023     4:25 AM 12/1/2023    11:37 AM 12/18/2023     3:09 PM 1/15/2024    12:02 PM 1/29/2024     3:10 AM   CHAVO-7 SCORE   Total Score 16 (severe anxiety) 15 (severe anxiety) 15 (severe anxiety) 14 (moderate anxiety) 16 (severe anxiety) 14 (moderate anxiety) 16 (severe anxiety)   Total Score 16    16     16 15 15 14 16 14 16     PROMIS 10-Global Health (only subscores and total score):       7/21/2023    10:36 AM 7/28/2023     3:24 PM 8/7/2023     1:07 PM 9/20/2023    10:13 AM 10/31/2023     4:36 AM 12/1/2023    11:52 AM 1/22/2024    12:00 PM   PROMIS-10 Scores Only   Global Mental Health Score 5    5  6    6     5    5 6  5   Global Physical Health Score 7    7  8    8     9    9 8  9   PROMIS TOTAL - SUBSCORES 12    12  14    14     14    14 14  14       Information is confidential and restricted. Go to Review Flowsheets to unlock data.    Multiple values from one day are sorted in reverse-chronological order        ASSESSMENT: Current Emotional / Mental Status (status of significant symptoms):   Risk status (Self / Other harm or suicidal ideation)   Patient denies current fears or concerns for personal safety.   Patient denies current or recent suicidal ideation or behaviors.   Patient denies current or recent homicidal ideation or behaviors.   Patient denies current or recent self injurious behavior or ideation.   Patient denies other safety concerns.   Patient reports there has been no change in risk factors since their last session.     Patient reports there has been no change in protective factors since their last session.     A safety and risk management plan has been developed including: Patient consented to co-developed safety plan on 11/24/2020, updated 2/20/23.  Safety and risk management plan was reviewed.   Patient agreed to use safety plan should any safety concerns arise.  A copy was made available to the patient.     Appearance:   Appropriate    Eye Contact:   Good    Psychomotor Behavior: Normal    Attitude:   Cooperative    Orientation:   All   Speech    Rate / Production: Normal/ Responsive    Volume:  Normal    Mood:    Anxious    Affect:    Appropriate    Thought Content:  Clear    Thought Form:  Coherent    Insight:    Good      Medication Review:   No changes to current psychiatric  medication(s)      Medication Compliance:   Yes     Changes in Health Issues:   None reported     Chemical Use Review:   Substance Use: Chemical use reviewed, no active concerns identified Nothing used since 2021.     Tobacco Use: No current tobacco use.      Diagnosis:  1. Bipolar 2 disorder (H)    2. Generalized anxiety disorder    3. Trauma and stressor-related disorder      Collateral Reports Completed:   Not Applicable    PLAN: (Patient Tasks / Therapist Tasks / Other)  Next session prep for Vincent  Talk to dietician about calcium  Connect with Dr. Coker to start on Fosomax  Schedule dental work  Start TMS  Start women's group     Look at taking one thing at the time  Continue to process past        There has been demonstrated improvement in functioning while patient has been engaged in psychotherapy/psychological service- if withdrawn the patient would deteriorate and/or relapse.     MICAELA SLADE, [facility]   2024                                                        ______________________________________________________________________    Individual Treatment Plan    Patient's Name: Randi Cleary  YOB: 1955    Date of Creation: 20  Date Treatment Plan Last Reviewed/Revised: 23    DSM5 Diagnoses: 296.89 Bipolar II Disorder Depressed, 300.02 (F41.1) Generalized Anxiety Disorder, or Adjustment Disorders  309.89 (F43.8) Other Specified Trauma and Stressor Related Disorder  Psychosocial / Contextual Factors: Patient's entire family of origin has , she now has a sister-in-law and  as support.  Relationship with  is conflictual. She is recovering from surgeries  PROMIS (reviewed every 90 days): PROMIS-10 Scores  PROMIS 10-Global Health (only subscores and total score):   PROMIS-10 Scores Only 2021 3/15/2022 3/15/2022 3/15/2022 3/24/2022 2022 2022   Global Mental Health Score 6 6 6 6 8 12 12   Global Physical Health Score 9 9 9  "9 8 11 10   PROMIS TOTAL - SUBSCORES 15 15 15 15 16 23 22       Referral / Collaboration:  Referral to another professional/service is not indicated at this time..    Anticipated number of session for this episode of care: 50  Anticipation frequency of session: Biweekly  Anticipated Duration of each session: 53 or more minutes due to intensity of trauma symptoms  Treatment plan will be reviewed in 90 days or when goals have been changed.   There has been demonstrated improvement in functioning while patient has been engaged in psychotherapy/psychological service- if withdrawn the patient would deteriorate and/or relapse.       MeasurableTreatment Goal(s) related to diagnosis / functional impairment(s)  Goal 1: Patient will \"jumpstart, getting going with the things I need to be doing around the house as far as picking up, doing things, trying to do something every day.  Also to lessen the animosity between me and my .\"    I will know I've met my goal when my shoulders are fixed and I can see.      Objective #A (Patient Action)    Patient will  increase frequency of engaging her in ADLs .  Status: Continued - Date(s): 12/10/21, 3/9/22, 6/9/22, 9/2/22, 12/1/22, 3/2/23, 6/6/23, 9/13/23, 12/14/23    Intervention(s)  Therapist will  engage patient in CBT, specifically behavioral activation .    Objective #B  Patient will   track and record at least 5 pleasant exchanges with . Patient will be able to identify at least 5 positive traits about her  and how he relates to her .  Status: Continued - Date(s): 12/10/21, 3/9/22, 6/9/22, 9/2/22, 12/1/22, 3/2/23, 6/6/23, 9/13/23, 12/14/23    Intervention(s)  Therapist will  teach assertiveness skills and assign homework related to relationship interactions .    Objective #C  Patient will  reduce level of depressive and anxious features as evidenced by reduction in score on her CHAVO-7 and PHQ-9 (scores of 15 and 16 at first measurment, respectively) .  Status: " "Continued - Date(s): 12/10/21, 3/9/22, 6/9/22, 9/2/22, 12/1/22, 3/2/23, 6/6/23, 9/13/23, 12/14/23    Intervention(s)  Therapist will  engage patient in person-centered therapy and CBT .    Patient has reviewed and agreed to the above plan.      MICAELA SLADE, [facility]  December 14, 2023                                                   Randi Cleary          SAFETY PLAN:  Step 1: Warning signs / cues (Thoughts, images, mood, situation, behavior) that a crisis may be developing:  Thoughts: \"I don't want to continue\" \"I am unwanted\"  Images: none  Thinking Processes: ruminating  Mood: anger  Behaviors: isolating/withdrawing , can't stop crying, not taking care of myself and not taking care of my responsibilities  Situations: small triggers, such as not being able to find something, or dropping something   Step 2: Coping strategies - Things I can do to take my mind off of my problems without contacting another person (relaxation technique, physical activity):  Distress Tolerance Strategies:  arts and crafts: drawing, play with my pet , listen to positive and upbeat music: any, change body temperature (ice pack/cold water)  and paced breathing/progressive muscle relaxation  Physical Activities: go for a walk, deep breathing and stretching   Focus on helpful thoughts:  \"You've been through this before, you can get through it again.\"  Step 3: People and social settings that provide distraction:                 Name: Carmen                            Name: D[place]                           Name: Aleida       pool, shopping, Carmen's house, [employer]       Step 4: Remind myself of people and things that are important to me and worth living for:  Clifford Little Donna, post-COVID world, options of what could be in your future        Step 5: When I am in crisis, I can ask these people to help me use my safety plan:                 Name: Sidney  Step 6: Making the environment safe:   go to sleep/daydream  Step 7: Professionals or " agencies I can contact during a crisis:  [facility] Daytime Number: [phone]  Suicide Prevention Lifeline: 6-619-933-SRSV (1129)  Crisis Text Line Service (available 24 hours a day, 7 days a week): Text [place] to 159255  Local Crisis Services: [facility] Crisis: [phone]  Adults can always access to the emPATH unit at [facility] (no phone number, utilize it like an urgent care or ER where you just show up)     Call 911 or go to my nearest emergency department.       I helped develop this safety plan and agree to use it when needed.  I have been given a copy of this plan.       Client signature _________________________________________________________________  Today s date:  11/24/2020  Adapted from Safety Plan Template 2008 Randi Poole and Robby Barba is reprinted with the express permission of the authors.  No portion of the Safety Plan Template may be reproduced without the express, written permission.  You can contact the authors at [email or web address] or [email or web address].
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
## 2024-02-15 ENCOUNTER — THERAPY VISIT (OUTPATIENT)
Dept: PSYCHIATRY | Facility: CLINIC | Age: 69
End: 2024-02-15
Payer: MEDICARE

## 2024-02-19 ENCOUNTER — TRANSFERRED RECORDS (OUTPATIENT)
Dept: HEALTH INFORMATION MANAGEMENT | Facility: CLINIC | Age: 69
End: 2024-02-19
Payer: MEDICARE

## 2024-02-19 ENCOUNTER — TELEPHONE (OUTPATIENT)
Dept: CARDIOLOGY | Facility: CLINIC | Age: 69
End: 2024-02-19
Payer: MEDICARE

## 2024-02-19 ENCOUNTER — VIRTUAL VISIT (OUTPATIENT)
Dept: PSYCHOLOGY | Facility: CLINIC | Age: 69
End: 2024-02-19
Payer: MEDICARE

## 2024-02-19 DIAGNOSIS — F41.1 GENERALIZED ANXIETY DISORDER: ICD-10-CM

## 2024-02-19 DIAGNOSIS — N64.4 BREAST PAIN, LEFT: Primary | ICD-10-CM

## 2024-02-19 DIAGNOSIS — F43.9 TRAUMA AND STRESSOR-RELATED DISORDER: ICD-10-CM

## 2024-02-19 DIAGNOSIS — F31.81 BIPOLAR 2 DISORDER (H): Primary | ICD-10-CM

## 2024-02-19 PROCEDURE — 90837 PSYTX W PT 60 MINUTES: CPT | Mod: 95 | Performed by: SOCIAL WORKER

## 2024-02-19 NOTE — PROGRESS NOTES
"[facility]                                     Progress Note    Patient Name: Randi Cleary  Date: 2/19/24         Service Type: Individual     Session Start Time: 12:05 PM Session End Time: 12:58 PM     Session Length: 53 minutes    Session #: 235    Attendees: Client attended alone    Service Modality:  Video Visit:      Provider verified identity through the following two step process.  Patient provided:  Patient is known previously to provider    Telemedicine Visit: The patient's condition can be safely assessed and treated via synchronous audio and visual telemedicine encounter.      Reason for Telemedicine Visit: Patient convenience (e.g. access to timely appointments / distance to available provider)    Originating Site (Patient Location): Patient's home    Distant Site (Provider Location): Provider Remote Setting- Home Office    Consent:  The patient/guardian has verbally consented to: the potential risks and benefits of telemedicine (video visit) versus in person care; bill my insurance or make self-payment for services provided; and responsibility for payment of non-covered services.     Patient would like the video invitation sent by:  My Chart    Mode of Communication:  Video Conference via [facility]    Distant Location (Provider):  Off-site    As the provider I attest to compliance with applicable laws and regulations related to telemedicine.       DATA  Extended Session (53+ minutes): No  Interactive Complexity: No  Crisis: No        Progress Since Last Session (Related to Symptoms / Goals / Homework):   Symptoms:  Patient reported she \"had it\" at the end of last week, but is now managing.       Homework:  Progress made    Talk to dietician about calcium  Connect with Dr. Coker to start on Fosomax  Schedule dental work  Start TMS  Start women's group     Look at taking one thing at the time  Continue to process past     Episode of Care Goals: Satisfactory progress - ACTION " (Actively working towards change); Intervened by reinforcing change plan / affirming steps taken     Current / Ongoing Stressors and Concerns:   Patient is currently socially isolated. She has a conflictual relationship with her .  She is getting minimal physical activity.  She has had several surgeries.      Treatment Objective(s) Addressed in This Session:   Patient will increase frequency of engaging her in ADLs.  Patient will track and record at least 5 pleasant exchanges with . Patient will be able to identify at least 5 positive traits about her .  Patient will reduce level of depressive and anxious features as evidenced by reduction in score on her CAHVO-7 and PHQ-9 (scores of 15 and 16 at first measurment, respectively).     Intervention:   Supportive Therapy:  Processed appointment with her physician and how it's impacting her life. Processed all appointments and identified next steps. Talked about next steps for her mental health.     The following assessments were completed by patient for this visit:  PHQ9:       12/18/2023     3:01 PM 12/19/2023     3:50 PM 12/29/2023    12:48 PM 1/15/2024    11:57 AM 1/22/2024    11:59 AM 1/28/2024    10:59 PM 2/7/2024    11:08 AM   PHQ-9 SCORE   PHQ-9 Total Score MyChart 19 (Moderately severe depression)   17 (Moderately severe depression) 15 (Moderately severe depression) 19 (Moderately severe depression)    PHQ-9 Total Score 19 16 20 17 15 19 16     GAD7:       9/20/2023     9:59 AM 10/9/2023     8:55 AM 10/31/2023     4:25 AM 12/1/2023    11:37 AM 12/18/2023     3:09 PM 1/15/2024    12:02 PM 1/29/2024     3:10 AM   CHAVO-7 SCORE   Total Score 16 (severe anxiety) 15 (severe anxiety) 15 (severe anxiety) 14 (moderate anxiety) 16 (severe anxiety) 14 (moderate anxiety) 16 (severe anxiety)   Total Score 16    16    16 15 15 14 16 14 16     PROMIS 10-Global Health (only subscores and total score):       7/21/2023    10:36 AM 7/28/2023     3:24 PM 8/7/2023      1:07 PM 9/20/2023    10:13 AM 10/31/2023     4:36 AM 12/1/2023    11:52 AM 1/22/2024    12:00 PM   PROMIS-10 Scores Only   Global Mental Health Score 5    5  6    6     5    5 6  5   Global Physical Health Score 7    7  8    8     9    9 8  9   PROMIS TOTAL - SUBSCORES 12    12  14    14     14    14 14  14       Information is confidential and restricted. Go to Review Flowsheets to unlock data.    Multiple values from one day are sorted in reverse-chronological order        ASSESSMENT: Current Emotional / Mental Status (status of significant symptoms):   Risk status (Self / Other harm or suicidal ideation)   Patient denies current fears or concerns for personal safety.   Patient denies current or recent suicidal ideation or behaviors.   Patient denies current or recent homicidal ideation or behaviors.   Patient denies current or recent self injurious behavior or ideation.   Patient denies other safety concerns.   Patient reports there has been no change in risk factors since their last session.     Patient reports there has been no change in protective factors since their last session.     A safety and risk management plan has been developed including: Patient consented to co-developed safety plan on 11/24/2020, updated 2/20/23.  Safety and risk management plan was reviewed.   Patient agreed to use safety plan should any safety concerns arise.  A copy was made available to the patient.     Appearance:   Appropriate    Eye Contact:   Good    Psychomotor Behavior: Normal    Attitude:   Cooperative    Orientation:   All   Speech    Rate / Production: Normal/ Responsive    Volume:  Normal    Mood:    Anxious    Affect:    Appropriate    Thought Content:  Clear    Thought Form:  Coherent    Insight:    Good      Medication Review:   No changes to current psychiatric medication(s)      Medication Compliance:   Yes     Changes in Health Issues:   None reported     Chemical Use Review:   Substance Use: Chemical use  reviewed, no active concerns identified Nothing used since 2021.     Tobacco Use: No current tobacco use.      Diagnosis:  1. Bipolar 2 disorder (H)    2. Generalized anxiety disorder    3. Trauma and stressor-related disorder        Collateral Reports Completed:   Not Applicable    PLAN: (Patient Tasks / Therapist Tasks / Other)  Talk to dietician about calcium  Connect with Dr. Coker to start on Fosomax  Schedule dental work  Start TMS  Start women's group     Look at taking one thing at the time  Continue to process past        There has been demonstrated improvement in functioning while patient has been engaged in psychotherapy/psychological service- if withdrawn the patient would deteriorate and/or relapse.     MICAELA SLADE, [facility]   2024                                                        ______________________________________________________________________    Individual Treatment Plan    Patient's Name: Randi Cleary  YOB: 1955    Date of Creation: 20  Date Treatment Plan Last Reviewed/Revised: 23    DSM5 Diagnoses: 296.89 Bipolar II Disorder Depressed, 300.02 (F41.1) Generalized Anxiety Disorder, or Adjustment Disorders  309.89 (F43.8) Other Specified Trauma and Stressor Related Disorder  Psychosocial / Contextual Factors: Patient's entire family of origin has , she now has a sister-in-law and  as support.  Relationship with  is conflictual. She is recovering from surgeries  PROMIS (reviewed every 90 days): PROMIS-10 Scores  PROMIS 10-Global Health (only subscores and total score):   PROMIS-10 Scores Only 2021 3/15/2022 3/15/2022 3/15/2022 3/24/2022 2022 2022   Global Mental Health Score 6 6 6 6 8 12 12   Global Physical Health Score 9 9 9 9 8 11 10   PROMIS TOTAL - SUBSCORES 15 15 15 15 16 23 22       Referral / Collaboration:  Referral to another professional/service is not indicated at this  "time..    Anticipated number of session for this episode of care: 50  Anticipation frequency of session: Biweekly  Anticipated Duration of each session: 53 or more minutes due to intensity of trauma symptoms  Treatment plan will be reviewed in 90 days or when goals have been changed.   There has been demonstrated improvement in functioning while patient has been engaged in psychotherapy/psychological service- if withdrawn the patient would deteriorate and/or relapse.       MeasurableTreatment Goal(s) related to diagnosis / functional impairment(s)  Goal 1: Patient will \"jumpstart, getting going with the things I need to be doing around the house as far as picking up, doing things, trying to do something every day.  Also to lessen the animosity between me and my .\"    I will know I've met my goal when my shoulders are fixed and I can see.      Objective #A (Patient Action)    Patient will  increase frequency of engaging her in ADLs .  Status: Continued - Date(s): 12/10/21, 3/9/22, 6/9/22, 9/2/22, 12/1/22, 3/2/23, 6/6/23, 9/13/23, 12/14/23    Intervention(s)  Therapist will  engage patient in CBT, specifically behavioral activation .    Objective #B  Patient will   track and record at least 5 pleasant exchanges with . Patient will be able to identify at least 5 positive traits about her  and how he relates to her .  Status: Continued - Date(s): 12/10/21, 3/9/22, 6/9/22, 9/2/22, 12/1/22, 3/2/23, 6/6/23, 9/13/23, 12/14/23    Intervention(s)  Therapist will  teach assertiveness skills and assign homework related to relationship interactions .    Objective #C  Patient will  reduce level of depressive and anxious features as evidenced by reduction in score on her CHAVO-7 and PHQ-9 (scores of 15 and 16 at first measurment, respectively) .  Status: Continued - Date(s): 12/10/21, 3/9/22, 6/9/22, 9/2/22, 12/1/22, 3/2/23, 6/6/23, 9/13/23, 12/14/23    Intervention(s)  Therapist will  engage patient in " "person-centered therapy and CBT .    Patient has reviewed and agreed to the above plan.      CRUZ SAMUELMICAELA PAULINO JO, [facility]  December 14, 2023                                                   Randi Cleary          SAFETY PLAN:  Step 1: Warning signs / cues (Thoughts, images, mood, situation, behavior) that a crisis may be developing:  Thoughts: \"I don't want to continue\" \"I am unwanted\"  Images: none  Thinking Processes: ruminating  Mood: anger  Behaviors: isolating/withdrawing , can't stop crying, not taking care of myself and not taking care of my responsibilities  Situations: small triggers, such as not being able to find something, or dropping something   Step 2: Coping strategies - Things I can do to take my mind off of my problems without contacting another person (relaxation technique, physical activity):  Distress Tolerance Strategies:  arts and crafts: drawing, play with my pet , listen to positive and upbeat music: any, change body temperature (ice pack/cold water)  and paced breathing/progressive muscle relaxation  Physical Activities: go for a walk, deep breathing and stretching   Focus on helpful thoughts:  \"You've been through this before, you can get through it again.\"  Step 3: People and social settings that provide distraction:                 Name: Carmen                            Name: Darien                           Name: Aleida       pool, shopping, Carmen's house, [employer]       Step 4: Remind myself of people and things that are important to me and worth living for:  Clifford Little Donna, post-COVID world, options of what could be in your future        Step 5: When I am in crisis, I can ask these people to help me use my safety plan:                 Name: Sidney  Step 6: Making the environment safe:   go to sleep/daydream  Step 7: Professionals or agencies I can contact during a crisis:  [facility] Daytime Number: [phone]  Suicide Prevention Lifeline: 9-118-032-MGKS " (0474)  Crisis Text Line Service (available 24 hours a day, 7 days a week): Text [place] to 561895  Local Crisis Services: [facility] Crisis: [phone]  Adults can always access to the emPATH unit at [facility] (no phone number, utilize it like an urgent care or ER where you just show up)     Call 911 or go to my nearest emergency department.       I helped develop this safety plan and agree to use it when needed.  I have been given a copy of this plan.       Client signature _________________________________________________________________  Today s date:  11/24/2020  Adapted from Safety Plan Template 2008 Randi Poole and Robby Barba is reprinted with the express permission of the authors.  No portion of the Safety Plan Template may be reproduced without the express, written permission.  You can contact the authors at [email or web address] or [email or web address].

## 2024-02-19 NOTE — TELEPHONE ENCOUNTER
Cath Lab Case Request/Order    Location: [address] [address] [facility] Waiting Room    Procedure: Right Heart Cath (RHC)    Procedure Date: 3/4    Patient Arrival Time: 9    Procedure Time: 3rd case to follow    Ordering Provider: Arlyn Stern    Performing Cardiologist: KATHRINE     Inpatient Bed Needed: No    Post-  Procedure RAKAN appointment scheduled (1 - 2 weeks): YES     Date: 4/16     Provider: Grace     Communicated Patient Instructions:  NURSE WILL GO OVER ALL PRE PROCEDURE INSTRUCTIONS TO PATIENT   Appointment was scheduled: Over the phone    Patient expressed understanding of above instructions and denied further questions at this time.    Siobhan Johnson

## 2024-02-20 ENCOUNTER — OFFICE VISIT (OUTPATIENT)
Dept: PSYCHIATRY | Facility: CLINIC | Age: 69
End: 2024-02-20
Attending: PSYCHOLOGIST
Payer: MEDICARE

## 2024-02-20 DIAGNOSIS — M19.071 OSTEOARTHRITIS OF RIGHT ANKLE AND FOOT: ICD-10-CM

## 2024-02-20 DIAGNOSIS — F31.81 BIPOLAR 2 DISORDER (H): ICD-10-CM

## 2024-02-20 DIAGNOSIS — F41.1 GAD (GENERALIZED ANXIETY DISORDER): Primary | ICD-10-CM

## 2024-02-20 PROCEDURE — 90853 GROUP PSYCHOTHERAPY: CPT | Mod: GC

## 2024-02-20 RX ORDER — OXYCODONE HYDROCHLORIDE 5 MG/1
5 TABLET ORAL 4 TIMES DAILY
Qty: 112 TABLET | Refills: 0 | Status: SHIPPED | OUTPATIENT
Start: 2024-02-20 | End: 2024-03-18

## 2024-02-20 NOTE — TELEPHONE ENCOUNTER
[facility] Call Center    Phone Message    May a detailed message be left on voicemail: yes     Reason for Call: Other: Patient called back and scheduled a follow up appt on 3/20/2024 at 8:00 AM.  Can she get her script filled for her Oxy today or tomorrow?  Please call her back.       Action Taken: Message routed to:  Other: [facility]    Travel Screening: Not Applicable

## 2024-02-20 NOTE — TELEPHONE ENCOUNTER
Pending Prescriptions:                       Disp   Refills    oxyCODONE (ROXICODONE) 5 MG tablet        112 ta*0            Sig: Take 1 tablet (5 mg) by mouth 4 times daily           Dispense/ 2/21/24 for start 2/23/24

## 2024-02-20 NOTE — TELEPHONE ENCOUNTER
Received call from patient requesting refill(s) of oxyCODONE (ROXICODONE) 5 MG tablet      Last dispensed from pharmacy on 01/24/24    Patient's last office/virtual visit by prescribing provider on 01/17/24  Next office/virtual appointment scheduled for None    Last urine drug screen date 10/18/23  Current opioid agreement on file (completed within the last year) Yes Date of opioid agreement: 10/18/23    E-prescribe to     [id number] - [place], [place] - [facility]  [address] [place] [address]  Phone: [phone] Fax: [phone]    Will route to nursing Kent for review and preparation of prescription(s).       Nevin Gunderson MA  [facility] [place]

## 2024-02-20 NOTE — TELEPHONE ENCOUNTER
[facility] Call Center    Phone Message    May a detailed message be left on voicemail: yes     Reason for Call: Medication Refill Request    Has the patient contacted the pharmacy for the refill? Yes   Name of medication being requested: oxyCODONE (ROXICODONE) 5 MG tablet   Provider who prescribed the medication: Silvino  Pharmacy:    [id number] [address]      Date medication is needed: Patient will be out by the end of the day. She forgot to call ahead of time.      Action Taken: Other: Pain    Travel Screening: Not Applicable

## 2024-02-21 ENCOUNTER — VIRTUAL VISIT (OUTPATIENT)
Dept: PSYCHOLOGY | Facility: CLINIC | Age: 69
End: 2024-02-21
Payer: MEDICARE

## 2024-02-21 ENCOUNTER — VIRTUAL VISIT (OUTPATIENT)
Dept: FAMILY MEDICINE | Facility: CLINIC | Age: 69
End: 2024-02-21
Payer: MEDICARE

## 2024-02-21 DIAGNOSIS — F41.1 GENERALIZED ANXIETY DISORDER: ICD-10-CM

## 2024-02-21 DIAGNOSIS — F43.9 TRAUMA AND STRESSOR-RELATED DISORDER: ICD-10-CM

## 2024-02-21 DIAGNOSIS — Z71.3 DIETARY COUNSELING AND SURVEILLANCE: Primary | ICD-10-CM

## 2024-02-21 DIAGNOSIS — F31.81 BIPOLAR 2 DISORDER (H): Primary | ICD-10-CM

## 2024-02-21 PROCEDURE — 90837 PSYTX W PT 60 MINUTES: CPT | Mod: 95 | Performed by: SOCIAL WORKER

## 2024-02-21 NOTE — PROGRESS NOTES
[facility]                                     Progress Note    Patient Name: Randi Cleary  Date: 2/21/24         Service Type: Individual     Session Start Time: 12:02 PM Session End Time: 12:55 PM     Session Length: 53 minutes    Session #: 236    Attendees: Client attended alone    Service Modality:  Video Visit:      Provider verified identity through the following two step process.  Patient provided:  Patient is known previously to provider    Telemedicine Visit: The patient's condition can be safely assessed and treated via synchronous audio and visual telemedicine encounter.      Reason for Telemedicine Visit: Patient convenience (e.g. access to timely appointments / distance to available provider)    Originating Site (Patient Location): Patient's home    Distant Site (Provider Location): Provider Remote Setting- Home Office    Consent:  The patient/guardian has verbally consented to: the potential risks and benefits of telemedicine (video visit) versus in person care; bill my insurance or make self-payment for services provided; and responsibility for payment of non-covered services.     Patient would like the video invitation sent by:  My Chart    Mode of Communication:  Video Conference via [facility]    Distant Location (Provider):  Off-site    As the provider I attest to compliance with applicable laws and regulations related to telemedicine.       DATA  Extended Session (53+ minutes): PROLONGED SERVICE IN THE OUTPATIENT SETTING REQUIRING DIRECT (FACE-TO-FACE) PATIENT CONTACT BEYOND THE USUAL SERVICE:    - Treatment protocol required additional time to complete, due to the nature of diagnosis being treated.  See Interventions section for details  Interactive Complexity: No  Crisis: No        Progress Since Last Session (Related to Symptoms / Goals / Homework):   Symptoms:  Patient continues to have daily struggles with her mental health, feeling overwhelmed and uncertain of where  to start       Homework:  Progress made    Talk to dietician about calcium  Connect with Dr. Coker to start on Fosomax  Schedule dental work  Start TMS  Start women's group -went once    Look at taking one thing at the time  Continue to process past     Episode of Care Goals: Satisfactory progress - ACTION (Actively working towards change); Intervened by reinforcing change plan / affirming steps taken     Current / Ongoing Stressors and Concerns:   Patient is currently socially isolated. She has a conflictual relationship with her .  She is getting minimal physical activity.  She has had several surgeries.      Treatment Objective(s) Addressed in This Session:   Patient will increase frequency of engaging her in ADLs.  Patient will track and record at least 5 pleasant exchanges with . Patient will be able to identify at least 5 positive traits about her .  Patient will reduce level of depressive and anxious features as evidenced by reduction in score on her CHAVO-7 and PHQ-9 (scores of 15 and 16 at first measurment, respectively).     Intervention:   Supportive Therapy:  Processed this provider's departure and how it impacts patient, discussing next steps. Processed her lack of connection she feels to providers and loneliness. Processed some early life events and impacts of them, connecting them to the loss she feels now. Discussed practical next steps.     The following assessments were completed by patient for this visit:  PHQ9:       12/18/2023     3:01 PM 12/19/2023     3:50 PM 12/29/2023    12:48 PM 1/15/2024    11:57 AM 1/22/2024    11:59 AM 1/28/2024    10:59 PM 2/7/2024    11:08 AM   PHQ-9 SCORE   PHQ-9 Total Score MyChart 19 (Moderately severe depression)   17 (Moderately severe depression) 15 (Moderately severe depression) 19 (Moderately severe depression)    PHQ-9 Total Score 19 16 20 17 15 19 16     GAD7:       9/20/2023     9:59 AM 10/9/2023     8:55 AM 10/31/2023     4:25 AM  12/1/2023    11:37 AM 12/18/2023     3:09 PM 1/15/2024    12:02 PM 1/29/2024     3:10 AM   CHAVO-7 SCORE   Total Score 16 (severe anxiety) 15 (severe anxiety) 15 (severe anxiety) 14 (moderate anxiety) 16 (severe anxiety) 14 (moderate anxiety) 16 (severe anxiety)   Total Score 16    16    16 15 15 14 16 14 16     PROMIS 10-Global Health (only subscores and total score):       7/21/2023    10:36 AM 7/28/2023     3:24 PM 8/7/2023     1:07 PM 9/20/2023    10:13 AM 10/31/2023     4:36 AM 12/1/2023    11:52 AM 1/22/2024    12:00 PM   PROMIS-10 Scores Only   Global Mental Health Score 5    5  6    6     5    5 6  5   Global Physical Health Score 7    7  8    8     9    9 8  9   PROMIS TOTAL - SUBSCORES 12    12  14    14     14    14 14  14       Information is confidential and restricted. Go to Review Flowsheets to unlock data.    Multiple values from one day are sorted in reverse-chronological order        ASSESSMENT: Current Emotional / Mental Status (status of significant symptoms):   Risk status (Self / Other harm or suicidal ideation)   Patient denies current fears or concerns for personal safety.   Patient denies current or recent suicidal ideation or behaviors.   Patient denies current or recent homicidal ideation or behaviors.   Patient denies current or recent self injurious behavior or ideation.   Patient denies other safety concerns.   Patient reports there has been no change in risk factors since their last session.     Patient reports there has been no change in protective factors since their last session.     A safety and risk management plan has been developed including: Patient consented to co-developed safety plan on 11/24/2020, updated 2/20/23.  Safety and risk management plan was reviewed.   Patient agreed to use safety plan should any safety concerns arise.  A copy was made available to the patient.     Appearance:   Appropriate    Eye Contact:   Good    Psychomotor Behavior: Normal     Attitude:   Cooperative    Orientation:   All   Speech    Rate / Production: Normal/ Responsive    Volume:  Normal    Mood:    Anxious    Affect:    Appropriate    Thought Content:  Clear    Thought Form:  Coherent    Insight:    Good      Medication Review:   No changes to current psychiatric medication(s)      Medication Compliance:   Yes     Changes in Health Issues:   None reported     Chemical Use Review:   Substance Use: Chemical use reviewed, no active concerns identified Nothing used since 2021.     Tobacco Use: No current tobacco use.      Diagnosis:  1. Bipolar 2 disorder (H)    2. Generalized anxiety disorder    3. Trauma and stressor-related disorder          Collateral Reports Completed:   Not Applicable    PLAN: (Patient Tasks / Therapist Tasks / Other)  Talk to dietician about calcium  Connect with Dr. Coker to discuss Fosomax  Schedule dental work  Start TMS  Continue women's group     Look at taking one thing at the time  Continue to process past        There has been demonstrated improvement in functioning while patient has been engaged in psychotherapy/psychological service- if withdrawn the patient would deteriorate and/or relapse.     MICAELA SLADE, [facility]   2024                                                        ______________________________________________________________________    Individual Treatment Plan    Patient's Name: Randi Cleary  YOB: 1955    Date of Creation: 20  Date Treatment Plan Last Reviewed/Revised: 23    DSM5 Diagnoses: 296.89 Bipolar II Disorder Depressed, 300.02 (F41.1) Generalized Anxiety Disorder, or Adjustment Disorders  309.89 (F43.8) Other Specified Trauma and Stressor Related Disorder  Psychosocial / Contextual Factors: Patient's entire family of origin has , she now has a sister-in-law and  as support.  Relationship with  is conflictual. She is recovering from surgeries  PROMIS  "(reviewed every 90 days): PROMIS-10 Scores  PROMIS 10-Global Health (only subscores and total score):   PROMIS-10 Scores Only 12/14/2021 3/15/2022 3/15/2022 3/15/2022 3/24/2022 4/1/2022 6/9/2022   Global Mental Health Score 6 6 6 6 8 12 12   Global Physical Health Score 9 9 9 9 8 11 10   PROMIS TOTAL - SUBSCORES 15 15 15 15 16 23 22       Referral / Collaboration:  Referral to another professional/service is not indicated at this time..    Anticipated number of session for this episode of care: 50  Anticipation frequency of session: Biweekly  Anticipated Duration of each session: 53 or more minutes due to intensity of trauma symptoms  Treatment plan will be reviewed in 90 days or when goals have been changed.   There has been demonstrated improvement in functioning while patient has been engaged in psychotherapy/psychological service- if withdrawn the patient would deteriorate and/or relapse.       MeasurableTreatment Goal(s) related to diagnosis / functional impairment(s)  Goal 1: Patient will \"jumpstart, getting going with the things I need to be doing around the house as far as picking up, doing things, trying to do something every day.  Also to lessen the animosity between me and my .\"    I will know I've met my goal when my shoulders are fixed and I can see.      Objective #A (Patient Action)    Patient will  increase frequency of engaging her in ADLs .  Status: Continued - Date(s): 12/10/21, 3/9/22, 6/9/22, 9/2/22, 12/1/22, 3/2/23, 6/6/23, 9/13/23, 12/14/23    Intervention(s)  Therapist will  engage patient in CBT, specifically behavioral activation .    Objective #B  Patient will   track and record at least 5 pleasant exchanges with . Patient will be able to identify at least 5 positive traits about her  and how he relates to her .  Status: Continued - Date(s): 12/10/21, 3/9/22, 6/9/22, 9/2/22, 12/1/22, 3/2/23, 6/6/23, 9/13/23, 12/14/23    Intervention(s)  Therapist will  teach " "assertiveness skills and assign homework related to relationship interactions .    Objective #C  Patient will  reduce level of depressive and anxious features as evidenced by reduction in score on her CHAVO-7 and PHQ-9 (scores of 15 and 16 at first measurment, respectively) .  Status: Continued - Date(s): 12/10/21, 3/9/22, 6/9/22, 9/2/22, 12/1/22, 3/2/23, 6/6/23, 9/13/23, 12/14/23    Intervention(s)  Therapist will  engage patient in person-centered therapy and CBT .    Patient has reviewed and agreed to the above plan.      MICAELA SLADE, [facility]  December 14, 2023                                                   Randi Cleary          SAFETY PLAN:  Step 1: Warning signs / cues (Thoughts, images, mood, situation, behavior) that a crisis may be developing:  Thoughts: \"I don't want to continue\" \"I am unwanted\"  Images: none  Thinking Processes: ruminating  Mood: anger  Behaviors: isolating/withdrawing , can't stop crying, not taking care of myself and not taking care of my responsibilities  Situations: small triggers, such as not being able to find something, or dropping something   Step 2: Coping strategies - Things I can do to take my mind off of my problems without contacting another person (relaxation technique, physical activity):  Distress Tolerance Strategies:  arts and crafts: drawing, play with my pet , listen to positive and upbeat music: any, change body temperature (ice pack/cold water)  and paced breathing/progressive muscle relaxation  Physical Activities: go for a walk, deep breathing and stretching   Focus on helpful thoughts:  \"You've been through this before, you can get through it again.\"  Step 3: People and social settings that provide distraction:                 Name: Carmen                            Name: Darien                           Name: Aleida       pool, shopping, Carmen's house, [employer]       Step 4: Remind myself of people and things that are important to me and worth living for:  " Clifford Little Donna, post-COVID world, options of what could be in your future        Step 5: When I am in crisis, I can ask these people to help me use my safety plan:                 Name: Sidney  Step 6: Making the environment safe:   go to sleep/daydream  Step 7: Professionals or agencies I can contact during a crisis:  [facility] Daytime Number: [phone]  Suicide Prevention Lifeline: 3-716-027-PTCD (9050)  Crisis Text Line Service (available 24 hours a day, 7 days a week): Text [place] to 982190  Local Crisis Services: [facility] Crisis: [phone]  Adults can always access to the emPATH unit at [facility] (no phone number, utilize it like an urgent care or ER where you just show up)     Call 911 or go to my nearest emergency department.       I helped develop this safety plan and agree to use it when needed.  I have been given a copy of this plan.       Client signature _________________________________________________________________  Today s date:  11/24/2020  Adapted from Safety Plan Template 2008 Randi Poole and Robby Barba is reprinted with the express permission of the authors.  No portion of the Safety Plan Template may be reproduced without the express, written permission.  You can contact the authors at [email or web address] or [email or web address].

## 2024-02-21 NOTE — PROGRESS NOTES
PATIENT HISTORY:   Ms. Randi Cleary returns for her nutrition visit to the lifestyle medicine weight management program. Previous measurements have been as follows:    Wt Readings from Last 5 Encounters:   02/07/24 93 kg (205 lb)   01/30/24 93.4 kg (206 lb)   01/23/24 94.7 kg (208 lb 11.2 oz)   01/03/24 98 kg (216 lb)   12/29/23 97.1 kg (214 lb)       Reported wts:  9/28/23 - 231 lb  10/19/23 - 222 lb  11/10/23 - 218.4  11/20/23 - 217  12/6/23 - 214  12/12/23 - 213  12/14/23 - 211  12/20/23 - 214 2/1/24 - 210  Got down to 205 then went up to 212  2/21/24 - 210 lb     Initial goal is to achieve a 5% weight loss of 11-12 lbs (i.e. A body weight of 216-217 lbs) within the next 3-4 months -- has met this goal, will discuss potentially setting a new goal at next visit.    Since her last visit, she reports feeling very overwhelmed with medical issues. Very upset that her therapist is leaving the [facility] system as they have worked together for nearly 3 years. Also would like to start the TMS, needing shoulder surgery, and a heart cath. Hard to prioritize and make a plan for each health condition. Continues with desire to focus on diet and getting exercise.     Weight was down initially in Feb but now back up. Notes some extra bread and cheese which she thinks is the culprit. Decreaesed from whole milk to 1%. Has been including 1-2 cottage cheese per day (good culture brand) and also having hard boiled eggs.     DIETARY hx  Supplements include: Vitamin C, methyl B complex, vitamin D    PHYSICAL ACTIVITY HISTORY:  Now, activity is very limited due to RIGHT ankle injuries, low back pain. Her activity is going up and down stairs in her house to do laundry or getting out of the house to go to appointments. Also, feeds the cat. Has trouble lifting because of her shoulders. Has a stationary bicycle at home, not really able to use at this point (sounds like a bike on a )  Long term goal is to walk in the  "pool  Interested in more activity, yoga - Silver Sneakers     Social: .  is  and may work very long 12 hour days.    OBJECTIVE:   Estimated body mass index is 33.09 kg/m  as calculated from the following:    Height as of 2/7/24: 1.676 m (5' 6\").    Weight as of 2/7/24: 93 kg (205 lb).      IMPRESSION:   Winnie is a 68 year old with obesity who is motivated to lose weight in order to improve her health  Weight trending down, she has lost about 20 lbs since late Sept 2023, maintaining so far this month (February 2024)       DIETARY ASSESSMENT/PLAN:   Would like to reduce bread and cheese  Start and keep a running list of dinner meal options. This will help with meal planning as you can more quickly choose something from your \"favorites\" list instead of having to think of several ideas on the spot.   Plan for osteopenia is walking and focus on calcium foods in diet. Already including yogurt and cottage cheese consistently.   Consider adding a calcium 500 mg two times per day    Follow-up:   Winnie will reach out to schedule when ready    Patient referred by Laith Constantino MD   Total time spent with patient 40 minutes    Ekaterina Horton RD    Family Medicine Video Visit Note  Winnie is being evaluated via a billable video visit.             Video Visit Consent     The patient has been notified of following:     \"This video visit will be conducted via a call between you and your physician/provider. We have found that certain health care needs can be provided without the need for an in-person physical exam.  This service lets us provide the care you need with a video conversation.  If a prescription is necessary we can send it directly to your pharmacy.  If lab work is needed we can place an order for that and you can then stop by our lab to have the test done at a later time.    Video visits are billed at different rates depending on your insurance coverage.  Please reach out to your " "insurance provider with any questions.    If during the course of the call the physician/provider feels a video visit is not appropriate, you will not be charged for this service.\"    Patient has given verbal consent for Video visit? Yes    How would you like to obtain your AVS? La    Patient would like the video invitation sent by: Other e-mail: my chart    Will anyone else be joining your video visit? No       Video-Visit Details    Type of service:  Video Visit    Video Start Time: 3:00 PM  THIS is the time provider and patient connect.    Video End Time (time video stopped): 3:40 PM    Originating Location (pt. Location): Home    Distant Location (provider location):  [facility]     Mode of Communication:  Video Conference via Isaac Horton RD                        "

## 2024-02-22 ENCOUNTER — OFFICE VISIT (OUTPATIENT)
Dept: PSYCHOLOGY | Facility: CLINIC | Age: 69
End: 2024-02-22
Payer: MEDICARE

## 2024-02-22 DIAGNOSIS — F41.1 GENERALIZED ANXIETY DISORDER: ICD-10-CM

## 2024-02-22 DIAGNOSIS — F31.81 BIPOLAR 2 DISORDER (H): Primary | ICD-10-CM

## 2024-02-22 DIAGNOSIS — F43.9 TRAUMA AND STRESSOR-RELATED DISORDER: ICD-10-CM

## 2024-02-22 PROCEDURE — 90837 PSYTX W PT 60 MINUTES: CPT | Performed by: SOCIAL WORKER

## 2024-02-22 NOTE — PROGRESS NOTES
[facility]                                     Progress Note    Patient Name: Randi Cleary  Date: 2/22/24         Service Type: Individual     Session Start Time: 1:00 PM Session End Time: 1:55 PM     Session Length: 55 minutes    Session #: 237    Attendees: Client attended alone    Service Modality:  In-person             DATA  Extended Session (53+ minutes): PROLONGED SERVICE IN THE OUTPATIENT SETTING REQUIRING DIRECT (FACE-TO-FACE) PATIENT CONTACT BEYOND THE USUAL SERVICE:    - Treatment protocol required additional time to complete, due to the nature of diagnosis being treated.  See Interventions section for details  Interactive Complexity: No  Crisis: No        Progress Since Last Session (Related to Symptoms / Goals / Homework):   Symptoms:  Patient has been upset and weepy at provider's departure, but is turning this into action by prioritizing TMS      Homework:  Progress made    Talk to dietician about calcium  Connect with Dr. Coker to discuss Fosomax  Schedule dental work  Start TMS -called to schedule  Continue women's group -plans to    Look at taking one thing at the time  Continue to process past     Episode of Care Goals: Satisfactory progress - ACTION (Actively working towards change); Intervened by reinforcing change plan / affirming steps taken     Current / Ongoing Stressors and Concerns:   Patient is currently socially isolated. She has a conflictual relationship with her .  She is getting minimal physical activity.  She has had several surgeries.      Treatment Objective(s) Addressed in This Session:   Patient will increase frequency of engaging her in ADLs.  Patient will track and record at least 5 pleasant exchanges with . Patient will be able to identify at least 5 positive traits about her .  Patient will reduce level of depressive and anxious features as evidenced by reduction in score on her CHAVO-7 and PHQ-9 (scores of 15 and 16 at  first measurment, respectively).     Intervention:   Supportive Therapy:  Continued to process provider's departure and steps patient was making to prioritize herself. Discussed her history with transition and saying goodbye. Discussed her personal relationships. Reviewed boundaries around ending therapy. Discussed TMS and jointly viewed the 4 minute video her psychiatrist had sent.       The following assessments were completed by patient for this visit:  PHQ9:       12/19/2023     3:50 PM 12/29/2023    12:48 PM 1/15/2024    11:57 AM 1/22/2024    11:59 AM 1/28/2024    10:59 PM 2/7/2024    11:08 AM 2/22/2024    12:13 PM   PHQ-9 SCORE   PHQ-9 Total Score MyChart   17 (Moderately severe depression) 15 (Moderately severe depression) 19 (Moderately severe depression)  18 (Moderately severe depression)   PHQ-9 Total Score 16 20 17 15 19 16 18     GAD7:       9/20/2023     9:59 AM 10/9/2023     8:55 AM 10/31/2023     4:25 AM 12/1/2023    11:37 AM 12/18/2023     3:09 PM 1/15/2024    12:02 PM 1/29/2024     3:10 AM   CHAVO-7 SCORE   Total Score 16 (severe anxiety) 15 (severe anxiety) 15 (severe anxiety) 14 (moderate anxiety) 16 (severe anxiety) 14 (moderate anxiety) 16 (severe anxiety)   Total Score 16    16    16 15 15 14 16 14 16     PROMIS 10-Global Health (only subscores and total score):       7/21/2023    10:36 AM 7/28/2023     3:24 PM 8/7/2023     1:07 PM 9/20/2023    10:13 AM 10/31/2023     4:36 AM 12/1/2023    11:52 AM 1/22/2024    12:00 PM   PROMIS-10 Scores Only   Global Mental Health Score 5    5  6    6     5    5 6  5   Global Physical Health Score 7    7  8    8     9    9 8  9   PROMIS TOTAL - SUBSCORES 12    12  14    14     14    14 14  14       Information is confidential and restricted. Go to Review Flowsheets to unlock data.    Multiple values from one day are sorted in reverse-chronological order        ASSESSMENT: Current Emotional / Mental Status (status of significant symptoms):   Risk status (Self /  Other harm or suicidal ideation)   Patient denies current fears or concerns for personal safety.   Patient denies current or recent suicidal ideation or behaviors.   Patient denies current or recent homicidal ideation or behaviors.   Patient denies current or recent self injurious behavior or ideation.   Patient denies other safety concerns.   Patient reports there has been no change in risk factors since their last session.     Patient reports there has been no change in protective factors since their last session.     A safety and risk management plan has been developed including: Patient consented to co-developed safety plan on 11/24/2020, updated 2/20/23.  Safety and risk management plan was reviewed.   Patient agreed to use safety plan should any safety concerns arise.  A copy was made available to the patient.     Appearance:   Appropriate    Eye Contact:   Good    Psychomotor Behavior: Normal    Attitude:   Cooperative    Orientation:   All   Speech    Rate / Production: Normal/ Responsive    Volume:  Normal    Mood:    Anxious    Affect:    Appropriate    Thought Content:  Clear    Thought Form:  Coherent    Insight:    Good      Medication Review:   No changes to current psychiatric medication(s)      Medication Compliance:   Yes     Changes in Health Issues:   None reported     Chemical Use Review:   Substance Use: Chemical use reviewed, no active concerns identified Nothing used since August 2021.     Tobacco Use: No current tobacco use.      Diagnosis:  1. Bipolar 2 disorder (H)    2. Generalized anxiety disorder    3. Trauma and stressor-related disorder        Collateral Reports Completed:   Not Applicable    PLAN: (Patient Tasks / Therapist Tasks / Other)  Talk to dietician about calcium  Connect with Dr. Coker to discuss Fosomax  Schedule dental work  Start TMS  Continue women's group     Look at taking one thing at the time  Continue to process past        There has been demonstrated  "improvement in functioning while patient has been engaged in psychotherapy/psychological service- if withdrawn the patient would deteriorate and/or relapse.     B MICAELA BAKER JO, [facility]   2024                                                        ______________________________________________________________________    Individual Treatment Plan    Patient's Name: Randi Cleary  YOB: 1955    Date of Creation: 20  Date Treatment Plan Last Reviewed/Revised: 23    DSM5 Diagnoses: 296.89 Bipolar II Disorder Depressed, 300.02 (F41.1) Generalized Anxiety Disorder, or Adjustment Disorders  309.89 (F43.8) Other Specified Trauma and Stressor Related Disorder  Psychosocial / Contextual Factors: Patient's entire family of origin has , she now has a sister-in-law and  as support.  Relationship with  is conflictual. She is recovering from surgeries  PROMIS (reviewed every 90 days): PROMIS-10 Scores  PROMIS 10-Global Health (only subscores and total score):   PROMIS-10 Scores Only 2021 3/15/2022 3/15/2022 3/15/2022 3/24/2022 2022 2022   Global Mental Health Score 6 6 6 6 8 12 12   Global Physical Health Score 9 9 9 9 8 11 10   PROMIS TOTAL - SUBSCORES 15 15 15 15 16 23 22       Referral / Collaboration:  Referral to another professional/service is not indicated at this time..    Anticipated number of session for this episode of care: 50  Anticipation frequency of session: Biweekly  Anticipated Duration of each session: 53 or more minutes due to intensity of trauma symptoms  Treatment plan will be reviewed in 90 days or when goals have been changed.   There has been demonstrated improvement in functioning while patient has been engaged in psychotherapy/psychological service- if withdrawn the patient would deteriorate and/or relapse.       MeasurableTreatment Goal(s) related to diagnosis / functional impairment(s)  Goal 1: Patient will \"jumpstart, getting " "going with the things I need to be doing around the house as far as picking up, doing things, trying to do something every day.  Also to lessen the animosity between me and my .\"    I will know I've met my goal when my shoulders are fixed and I can see.      Objective #A (Patient Action)    Patient will  increase frequency of engaging her in ADLs .  Status: Continued - Date(s): 12/10/21, 3/9/22, 6/9/22, 9/2/22, 12/1/22, 3/2/23, 6/6/23, 9/13/23, 12/14/23    Intervention(s)  Therapist will  engage patient in CBT, specifically behavioral activation .    Objective #B  Patient will   track and record at least 5 pleasant exchanges with . Patient will be able to identify at least 5 positive traits about her  and how he relates to her .  Status: Continued - Date(s): 12/10/21, 3/9/22, 6/9/22, 9/2/22, 12/1/22, 3/2/23, 6/6/23, 9/13/23, 12/14/23    Intervention(s)  Therapist will  teach assertiveness skills and assign homework related to relationship interactions .    Objective #C  Patient will  reduce level of depressive and anxious features as evidenced by reduction in score on her CHAVO-7 and PHQ-9 (scores of 15 and 16 at first measurment, respectively) .  Status: Continued - Date(s): 12/10/21, 3/9/22, 6/9/22, 9/2/22, 12/1/22, 3/2/23, 6/6/23, 9/13/23, 12/14/23    Intervention(s)  Therapist will  engage patient in person-centered therapy and CBT .    Patient has reviewed and agreed to the above plan.      MICAELA SLADE, [facility]  December 14, 2023                                                   Randi Cleary          SAFETY PLAN:  Step 1: Warning signs / cues (Thoughts, images, mood, situation, behavior) that a crisis may be developing:  Thoughts: \"I don't want to continue\" \"I am unwanted\"  Images: none  Thinking Processes: ruminating  Mood: anger  Behaviors: isolating/withdrawing , can't stop crying, not taking care of myself and not taking care of my responsibilities  Situations: small triggers, " "such as not being able to find something, or dropping something   Step 2: Coping strategies - Things I can do to take my mind off of my problems without contacting another person (relaxation technique, physical activity):  Distress Tolerance Strategies:  arts and crafts: drawing, play with my pet , listen to positive and upbeat music: any, change body temperature (ice pack/cold water)  and paced breathing/progressive muscle relaxation  Physical Activities: go for a walk, deep breathing and stretching   Focus on helpful thoughts:  \"You've been through this before, you can get through it again.\"  Step 3: People and social settings that provide distraction:                 Name: Carmen                            Name: Darien                           Name: Aleida       pool, shopping, Carmen's house, [employer]       Step 4: Remind myself of people and things that are important to me and worth living for:  Clifford Little Donna, post-COVID world, options of what could be in your future        Step 5: When I am in crisis, I can ask these people to help me use my safety plan:                 Name: Sidney  Step 6: Making the environment safe:   go to sleep/daydream  Step 7: Professionals or agencies I can contact during a crisis:  [facility] Daytime Number: [phone]  Suicide Prevention Lifeline: 1-977-368-BVCE (8428)  Crisis Text Line Service (available 24 hours a day, 7 days a week): Text [place] to 355577  Local Crisis Services: [facility] Crisis: 654.604.2008  Adults can always access to the emPATH unit at [facility] (no phone number, utilize it like an urgent care or ER where you just show up)     Call 911 or go to my nearest emergency department.       I helped develop this safety plan and agree to use it when needed.  I have been given a copy of this plan.       Client signature _________________________________________________________________  Today s date:  11/24/2020  Adapted from " Safety Plan Template 2008 Randi Poole and Robby Barba is reprinted with the express permission of the authors.  No portion of the Safety Plan Template may be reproduced without the express, written permission.  You can contact the authors at bhs@[email or web address] or [email or web address].

## 2024-02-23 ENCOUNTER — TELEPHONE (OUTPATIENT)
Dept: PSYCHIATRY | Facility: CLINIC | Age: 69
End: 2024-02-23
Payer: MEDICARE

## 2024-02-23 ENCOUNTER — PATIENT OUTREACH (OUTPATIENT)
Dept: CARE COORDINATION | Facility: CLINIC | Age: 69
End: 2024-02-23
Payer: MEDICARE

## 2024-02-23 NOTE — TELEPHONE ENCOUNTER
"Writer returned Winnie's phone call. She reports she is currently taking Lamictal 75 mg (3 tablets). Winnie said she followed Jeannette's instructions and increased to 75 mg on approximately 2/11. About two weeks ago, she noticed the development of a \"fine rash.\" Winnie reports it has not worsened and is not distressing. She denies itching, peeling, bruising or sores in her mouth or around her eyes.    Winnie reports the anxiety/rage episodes have worsened in intensity at the 75 mg and said they typically occur once a day. She said this is very hard to control and extremely distressing. Despite this, she also reports some benefit from the medication. She has noticed improved motivation to complete tasks and feels that she's getting along with others better than she was prior to the medication. Still, she feels the negatives outweigh the positives and would like to taper off the medication.    Winnie also shared TMS will be starting on 3/6. She was informed she should not make medication changes while receiving TMS and is unsure if she has enough time to taper off the medication now.     Lastly, she wanted to update Jeannette that her therapist, Mary Kay Gomez will be leaving 3/15. This is ultimately why Winnie decided to move up her TMS to early March.    Writer agreed to discuss all of the above with the provider and will then call Winnie back with recommendations. Patient is okay with waiting until the appt on Monday, 2/26 if Jeannette would prefer to meet with her first.     Kimberly Zavala RN on 2/23/2024 at 12:56 PM    "

## 2024-02-23 NOTE — TELEPHONE ENCOUNTER
[facility] Call Center    Phone Message    May a detailed message be left on voicemail: yes     Reason for Call: Other: Patient called hoping to speak to their provider, Jeannette Mendoza, regarding discontinuing their prescription of Lamotrigine 25mg. She was interested in getting off of this medication, and was hoping to talk about how to start the process. They have an upcoming appointment on Monday, but was hoping to get a call-back today if possible.     Action Taken: Message routed to:  Other: [facility] psychiatry nurse pool    Travel Screening: Not Applicable

## 2024-02-23 NOTE — TELEPHONE ENCOUNTER
"Writer called Winnie back and inquired about the location of the rash. She reports it is on both arms. She reiterated that it's \"very fine, maybe not a rash.\" But she does report it's red and started around the time the dose was increased to 50 mg. She understands she should discontinue the medication and meet with DION Kaplan CNP on Monday, 2/26.    Patient understands she should go to the nearest ER if the rash worsens, she develops purpura, peeling, it involves her mouth/eyes/palms, and/or she develops a fever or flu-like symptoms.      Kimberly Zavala RN on 2/23/2024 at 2:50 PM    "

## 2024-02-23 NOTE — PROGRESS NOTES
"Clinic Care Coordination Contact  Follow Up Progress Note      Assessment: CCC RN spoke with patient today to follow up goal and to discuss any needs or concerns. Patient stated her therapist Corine Gomez has decided to move on from [facility]. She stated she is feeling \"heartsick\" as she has a good working relationship with her for the past two years. Discussed talking to Corine Gomez about a recommendation for a new therapist. Patient agreed to talk with her about  a recommendation. Also discussed Care Coordination would be able to assist with finding therapist if needed. Patient said she will begin TMS treatments early next with with the hopes this will give her some relief from her chronic depressive symptoms. Patient denied any other needs or concerns at this time.   Care Gaps:    Health Maintenance Due   Topic Date Due    HF ACTION PLAN  Never done    DIABETIC FOOT EXAM  Never done    ADVANCE CARE PLANNING  Never done    COPD ACTION PLAN  Never done    ZOSTER IMMUNIZATION (1 of 2) Never done    MEDICARE ANNUAL WELLNESS VISIT  Never done    EYE EXAM  12/07/2021    MAMMO SCREENING  08/15/2023    COLORECTAL CANCER SCREENING  02/25/2024       Patient accepted scheduling phone number for PCP  to schedule independently     Care Plans  Care Plan: Mental Health       Problem: Mental Health Symptoms Need Improvement       Goal: I would like to feel as though my mental health has improved.       Start Date: 9/25/2023 Expected End Date: 9/24/2024    Recent Progress: 10%    Priority: High    Note:     Barriers: history of anxiety, bipolar disorder, trauma related disorder  Strengths: strong advocate for herself  Patient expressed understanding of goal: yes  Action steps to achieve this goal:  1. I will continue to attend all appointments with my therapist Mary Kay Gomez and with my new psychiatric provide.   2. I will continue to attend all appointments with the [facility] partial-hospitalization program.   3. I will continue to the use the " skills I have learned through therapy and the partial hospitalization program.   4. I will take my medications as directed.   5. I will report progress towards this goal at schedule outreach telephone calls from my Care Coordinator.   Disucssed on 2/23/24                              Intervention/Education provided during outreach: Discussed the importance of taking her medications as directed. Encouraged attend all upcoming appointments as scheduled. Encouraged her to contact Care Coordination for any additional needs or concerns.      Outreach Frequency: monthly, more frequently as needed        Plan: CCC Rn will continue to monitor, support patient with current goal and will be available to assist as nursing needs arise.     Care Coordinator will follow up in one month.

## 2024-02-26 ENCOUNTER — VIRTUAL VISIT (OUTPATIENT)
Dept: PSYCHIATRY | Facility: CLINIC | Age: 69
End: 2024-02-26
Payer: MEDICARE

## 2024-02-26 ENCOUNTER — TELEPHONE (OUTPATIENT)
Dept: PSYCHIATRY | Facility: CLINIC | Age: 69
End: 2024-02-26

## 2024-02-26 ENCOUNTER — VIRTUAL VISIT (OUTPATIENT)
Dept: PSYCHOLOGY | Facility: CLINIC | Age: 69
End: 2024-02-26
Payer: MEDICARE

## 2024-02-26 DIAGNOSIS — F43.9 TRAUMA AND STRESSOR-RELATED DISORDER: ICD-10-CM

## 2024-02-26 DIAGNOSIS — F41.1 GENERALIZED ANXIETY DISORDER: Primary | ICD-10-CM

## 2024-02-26 PROCEDURE — 90833 PSYTX W PT W E/M 30 MIN: CPT | Mod: 95

## 2024-02-26 PROCEDURE — 90834 PSYTX W PT 45 MINUTES: CPT | Mod: 95 | Performed by: SOCIAL WORKER

## 2024-02-26 PROCEDURE — 99214 OFFICE O/P EST MOD 30 MIN: CPT | Mod: 95

## 2024-02-26 PROCEDURE — G2211 COMPLEX E/M VISIT ADD ON: HCPCS | Mod: 95

## 2024-02-26 NOTE — PATIENT INSTRUCTIONS
"DIETARY ASSESSMENT/PLAN:   Would like to reduce bread and cheese  Start and keep a running list of dinner meal options. This will help with meal planning as you can more quickly choose something from your \"favorites\" list instead of having to think of several ideas on the spot.   Plan for osteopenia is walking and focus on calcium foods in diet. Already including yogurt and cottage cheese consistently.   Consider adding a calcium 500 mg two times per day    Follow-up:   Winnie will reach out to schedule when ready    Ekaterina Horton RD    "

## 2024-02-26 NOTE — PROGRESS NOTES
[facility]                                     Progress Note    Patient Name: Randi Cleary  Date: 2/26/24         Service Type: Individual     Session Start Time: 3:34 PM Session End Time: 4:20 PM     Session Length: 46 minutes    Session #: 238    Attendees: Client attended alone    Service Modality:  Video Visit:      Provider verified identity through the following two step process.  Patient provided:  Patient is known previously to provider    Telemedicine Visit: The patient's condition can be safely assessed and treated via synchronous audio and visual telemedicine encounter.      Reason for Telemedicine Visit: Patient convenience (e.g. access to timely appointments / distance to available provider)    Originating Site (Patient Location): Patient's home    Distant Site (Provider Location): Provider Remote Setting- Home Office    Consent:  The patient/guardian has verbally consented to: the potential risks and benefits of telemedicine (video visit) versus in person care; bill my insurance or make self-payment for services provided; and responsibility for payment of non-covered services.     Patient would like the video invitation sent by:  My Chart    Mode of Communication:  Video Conference via [facility]    Distant Location (Provider):  Off-site    As the provider I attest to compliance with applicable laws and regulations related to telemedicine.             DATA  Extended Session (53+ minutes): No  Interactive Complexity: No  Crisis: No        Progress Since Last Session (Related to Symptoms / Goals / Homework):   Symptoms:  Patient reports doing decently well right now and is looking forward to TMS.      Homework:  Progress made    Talk to dietician about calcium -not yet done  Connect with Dr. Coker to discuss Fosomax -decided not  Schedule dental work -not done  Start TMS -scheduled  Continue women's group -she's uncertain about this    Look at taking one thing at the  time  Continue to process past     Episode of Care Goals: Satisfactory progress - ACTION (Actively working towards change); Intervened by reinforcing change plan / affirming steps taken     Current / Ongoing Stressors and Concerns:   Patient is currently socially isolated. She has a conflictual relationship with her .  She is getting minimal physical activity.  She has had several surgeries.      Treatment Objective(s) Addressed in This Session:   Patient will increase frequency of engaging her in ADLs.  Patient will track and record at least 5 pleasant exchanges with . Patient will be able to identify at least 5 positive traits about her .  Patient will reduce level of depressive and anxious features as evidenced by reduction in score on her CHAVO-7 and PHQ-9 (scores of 15 and 16 at first measurment, respectively).     Intervention:   Supportive Therapy:  Further processed provider's departure and patient's starting TMS. Discussed what she can do to take care of herself. Discussed how hard transitions are and how she can work on them for the future. Connected this to childhood and feelings of abandonment. Discussed diagnosis and how multiple providers are looking at this. Discussed how borderline personality disorder may fit better than bipolar due to rapid mood changes and history of trauma.       The following assessments were completed by patient for this visit:  PHQ9:       12/19/2023     3:50 PM 12/29/2023    12:48 PM 1/15/2024    11:57 AM 1/22/2024    11:59 AM 1/28/2024    10:59 PM 2/7/2024    11:08 AM 2/22/2024    12:13 PM   PHQ-9 SCORE   PHQ-9 Total Score MyChart   17 (Moderately severe depression) 15 (Moderately severe depression) 19 (Moderately severe depression)  18 (Moderately severe depression)   PHQ-9 Total Score 16 20 17 15 19 16 18     GAD7:       9/20/2023     9:59 AM 10/9/2023     8:55 AM 10/31/2023     4:25 AM 12/1/2023    11:37 AM 12/18/2023     3:09 PM 1/15/2024    12:02 PM  1/29/2024     3:10 AM   CHAVO-7 SCORE   Total Score 16 (severe anxiety) 15 (severe anxiety) 15 (severe anxiety) 14 (moderate anxiety) 16 (severe anxiety) 14 (moderate anxiety) 16 (severe anxiety)   Total Score 16    16    16 15 15 14 16 14 16     PROMIS 10-Global Health (only subscores and total score):       7/21/2023    10:36 AM 7/28/2023     3:24 PM 8/7/2023     1:07 PM 9/20/2023    10:13 AM 10/31/2023     4:36 AM 12/1/2023    11:52 AM 1/22/2024    12:00 PM   PROMIS-10 Scores Only   Global Mental Health Score 5    5  6    6     5    5 6  5   Global Physical Health Score 7    7  8    8     9    9 8  9   PROMIS TOTAL - SUBSCORES 12    12  14    14     14    14 14  14       Information is confidential and restricted. Go to Review Flowsheets to unlock data.    Multiple values from one day are sorted in reverse-chronological order        ASSESSMENT: Current Emotional / Mental Status (status of significant symptoms):   Risk status (Self / Other harm or suicidal ideation)   Patient denies current fears or concerns for personal safety.   Patient denies current or recent suicidal ideation or behaviors.   Patient denies current or recent homicidal ideation or behaviors.   Patient denies current or recent self injurious behavior or ideation.   Patient denies other safety concerns.   Patient reports there has been no change in risk factors since their last session.     Patient reports there has been no change in protective factors since their last session.     A safety and risk management plan has been developed including: Patient consented to co-developed safety plan on 11/24/2020, updated 2/20/23.  Safety and risk management plan was reviewed.   Patient agreed to use safety plan should any safety concerns arise.  A copy was made available to the patient.     Appearance:   Appropriate    Eye Contact:   Good    Psychomotor Behavior: Normal    Attitude:   Cooperative    Orientation:   All   Speech    Rate / Production: Normal/  Responsive    Volume:  Normal    Mood:    Anxious    Affect:    Appropriate    Thought Content:  Clear    Thought Form:  Coherent    Insight:    Good      Medication Review:   No changes to current psychiatric medication(s)      Medication Compliance:   Yes     Changes in Health Issues:   None reported     Chemical Use Review:   Substance Use: Chemical use reviewed, no active concerns identified Nothing used since 2021.     Tobacco Use: No current tobacco use.      Diagnosis:  1. Generalized anxiety disorder    2. Trauma and stressor-related disorder    Borderline personality disorder vs. Bipolar 2      Collateral Reports Completed:   Not Applicable    PLAN: (Patient Tasks / Therapist Tasks / Other)  Write out thoughts on transitions rather than avoiding them  Talk to dietician about calcium  Schedule dental work  Start TMS  Continue women's group     Look at taking one thing at the time  Continue to process past        There has been demonstrated improvement in functioning while patient has been engaged in psychotherapy/psychological service- if withdrawn the patient would deteriorate and/or relapse.     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  Relationship with  is conflictual. She is recovering from surgeries  PROMIS (reviewed every 90 days): PROMIS-10 Scores  PROMIS 10-Global  "Health (only subscores and total score):   PROMIS-10 Scores Only 12/14/2021 3/15/2022 3/15/2022 3/15/2022 3/24/2022 4/1/2022 6/9/2022   Global Mental Health Score 6 6 6 6 8 12 12   Global Physical Health Score 9 9 9 9 8 11 10   PROMIS TOTAL - SUBSCORES 15 15 15 15 16 23 22       Referral / Collaboration:  Referral to another professional/service is not indicated at this time..    Anticipated number of session for this episode of care: 50  Anticipation frequency of session: Biweekly  Anticipated Duration of each session: 53 or more minutes due to intensity of trauma symptoms  Treatment plan will be reviewed in 90 days or when goals have been changed.   There has been demonstrated improvement in functioning while patient has been engaged in psychotherapy/psychological service- if withdrawn the patient would deteriorate and/or relapse.       MeasurableTreatment Goal(s) related to diagnosis / functional impairment(s)  Goal 1: Patient will \"jumpstart, getting going with the things I need to be doing around the house as far as picking up, doing things, trying to do something every day.  Also to lessen the animosity between me and my .\"    I will know I've met my goal when my shoulders are fixed and I can see.      Objective #A (Patient Action)    Patient will  increase frequency of engaging her in ADLs .  Status: Continued - Date(s): 12/10/21, 3/9/22, 6/9/22, 9/2/22, 12/1/22, 3/2/23, 6/6/23, 9/13/23, 12/14/23    Intervention(s)  Therapist will  engage patient in CBT, specifically behavioral activation .    Objective #B  Patient will   track and record at least 5 pleasant exchanges with . Patient will be able to identify at least 5 positive traits about her  and how he relates to her .  Status: Continued - Date(s): 12/10/21, 3/9/22, 6/9/22, 9/2/22, 12/1/22, 3/2/23, 6/6/23, 9/13/23, 12/14/23    Intervention(s)  Therapist will  teach assertiveness skills and assign homework related to relationship " "interactions .    Objective #C  Patient will  reduce level of depressive and anxious features as evidenced by reduction in score on her CHAVO-7 and PHQ-9 (scores of 15 and 16 at first measurment, respectively) .  Status: Continued - Date(s): 12/10/21, 3/9/22, 6/9/22, 9/2/22, 12/1/22, 3/2/23, 6/6/23, 9/13/23, 12/14/23    Intervention(s)  Therapist will  engage patient in person-centered therapy and CBT .    Patient has reviewed and agreed to the above plan.      MICAELA SLADE, [facility]  December 14, 2023                                                   Randi Cleary          SAFETY PLAN:  Step 1: Warning signs / cues (Thoughts, images, mood, situation, behavior) that a crisis may be developing:  Thoughts: \"I don't want to continue\" \"I am unwanted\"  Images: none  Thinking Processes: ruminating  Mood: anger  Behaviors: isolating/withdrawing , can't stop crying, not taking care of myself and not taking care of my responsibilities  Situations: small triggers, such as not being able to find something, or dropping something   Step 2: Coping strategies - Things I can do to take my mind off of my problems without contacting another person (relaxation technique, physical activity):  Distress Tolerance Strategies:  arts and crafts: drawing, play with my pet , listen to positive and upbeat music: any, change body temperature (ice pack/cold water)  and paced breathing/progressive muscle relaxation  Physical Activities: go for a walk, deep breathing and stretching   Focus on helpful thoughts:  \"You've been through this before, you can get through it again.\"  Step 3: People and social settings that provide distraction:                 Name: Carmen                            Name: Darien                           Name: Aleida       pool, shopping, Carmen's house, [employer]       Step 4: Remind myself of people and things that are important to me and worth living for:  Clifford Little Donna, post-COVID world, options of what could be " in your future        Step 5: When I am in crisis, I can ask these people to help me use my safety plan:                 Name: Sidney  Step 6: Making the environment safe:   go to sleep/daydream  Step 7: Professionals or agencies I can contact during a crisis:  [facility] Daytime Number: [phone]  Suicide Prevention Lifeline: 5-758-595-TALK (8255)  Crisis Text Line Service (available 24 hours a day, 7 days a week): Text [place] to 747246  Local Crisis Services: [facility] Crisis: [phone]  Adults can always access to the emPATH unit at [facility] (no phone number, utilize it like an urgent care or ER where you just show up)     Call 911 or go to my nearest emergency department.       I helped develop this safety plan and agree to use it when needed.  I have been given a copy of this plan.       Client signature _________________________________________________________________  Today s date:  11/24/2020  Adapted from Safety Plan Template 2008 Randi Poole and Robby Barba is reprinted with the express permission of the authors.  No portion of the Safety Plan Template may be reproduced without the express, written permission.  You can contact the authors at [email or web address] or [email or web address].

## 2024-02-26 NOTE — TELEPHONE ENCOUNTER
Spoke with patient. Patient had concerns about conflicting appointments coming up. Informed her we can look at schedules closer to dates she needs to rechedule.

## 2024-02-26 NOTE — NURSING NOTE
Is the patient currently in the state of [place]? YES    Visit mode:VIDEO    If the visit is dropped, the patient can be reconnected by: VIDEO VISIT: Text to cell phone:   Telephone Information:   Mobile [phone]       Will anyone else be joining the visit? NO  (If patient encounters technical issues they should call [phone]    How would you like to obtain your AVS? MyChart    Are changes needed to the allergy or medication list? Pt stated no changes to allergies and Pt stated no med changes    Reason for visit: TK GARCIAF
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
## 2024-02-26 NOTE — PROGRESS NOTES
Virtual Visit Details    Type of service:  Video Visit   Video Start Time: 1226  Video End Time:1257    Originating Location (pt. Location): Home    Distant Location (provider location):  On-site  Platform used for Video Visit: [email or web address]

## 2024-02-27 ENCOUNTER — OFFICE VISIT (OUTPATIENT)
Dept: PSYCHIATRY | Facility: CLINIC | Age: 69
End: 2024-02-27
Attending: PSYCHOLOGIST
Payer: MEDICARE

## 2024-02-27 DIAGNOSIS — F41.1 GENERALIZED ANXIETY DISORDER: Primary | ICD-10-CM

## 2024-02-27 DIAGNOSIS — F43.10 PTSD (POST-TRAUMATIC STRESS DISORDER): ICD-10-CM

## 2024-02-27 DIAGNOSIS — F31.81 BIPOLAR 2 DISORDER (H): ICD-10-CM

## 2024-02-27 PROCEDURE — 99207 PR SERVICE NOT STAFFED W/SUPERV PROV: CPT | Performed by: PSYCHOLOGIST

## 2024-02-27 NOTE — PATIENT INSTRUCTIONS
PSYCHOTROPIC MEDICATION RECOMMENDATIONS:  -Remain off lamotrigine. Nursing will call you at the end of the week to check-in on dermatologic status.   -Starting TMS; appointments begin next week. Will hold off on medication change at this time.     **For crisis resources, please see the information at the end of this document**   Patient Education    Thank you for coming to the [facility] [place] [facility].     Lab Testing:  If you had lab testing today and your results are reassuring or normal they will be mailed to you or sent through [employer] within 7 days. If the lab tests need quick action we will call you with the results. The phone number we will call with results is # [phone]. If this is not the best number please call our clinic and change the number.     Medication Refills:  If you need any refills please call your pharmacy and they will contact us. Our fax number for refills is [phone].   Three business days of notice are needed for general medication refill requests.   Five business days of notice are needed for controlled substance refill requests.   If you need to change to a different pharmacy, please contact the new pharmacy directly. The new pharmacy will help you get your medications transferred.     Contact Us:  Please call [phone] during business hours (8-5:00 M-F).   If you have medication related questions after clinic hours, or on the weekend, please call [phone].     Financial Assistance [phone]   Medical Records [phone]       MENTAL HEALTH CRISIS RESOURCES:  For a emergency help, please call 911 or go to the nearest Emergency Department.     Emergency Walk-In Options:   EmPATH Unit @ [facility] ([place]): [phone] - Specialized mental health emergency area designed to be calming  [facility] ([place]): [phone]  [facility] ([place]): [phone]  [facility]  Center ([place]): [phone]    [facility] Crisis Information:   [place]: [phone]  [place]: [phone]  [place] ([place]) - Adult: [phone]     Child: [phone]  [place] - Adult: [phone]     Child: [phone]  [place]: [phone]  List of all [facility] resources:   https://[place].[facility]/dhs/people-we-serve/adults/health-care/mental-health/resources/crisis-contacts.jsp    National Crisis Information:   Crisis Text Line: Text  [place]  to 159463  Suicide & Crisis Lifeline: 988  National Suicide Prevention Lifeline: 2-288-074-LPXN (1-722.413.1300)       For online chat options, visit https://suicidepreventionlifeline.org/chat/  Poison Control Center: 1-638.167.1498  Trans Lifeline: 1-174.453.7048 - Hotline for transgender people of all ages  [employer]: 0-440-344-0138 - Hotline for LGBT youth     For Non-Emergency Support:   Fast Tracker: Mental Health & Substance Use Disorder Resources -   https://www.[employer] [facility].org/

## 2024-02-28 NOTE — TELEPHONE ENCOUNTER
Spoke to pt about her procedure.  Pt needed to reschedule the appt after the 1st of May with a follow-up with Dr Edwards.    Pre-procedure instructions - Right heart catheterization  Patient Education    Your arrival time is 9am on Monday 3/4/24.  Location is [id number] [place] [address] Waiting Room  Please plan on being at the hospital all day.  At any time, emergencies and/or urgent cases may come up which could delay the start of your procedure.    Pre-procedure instructions - Right heart catheterization  No solid food for 8 hours prior  Nothing to drink 2 hours prior to arrival time  You can take your morning medications (except diabetic and blood thinners) with sips of water  We recommend you arrange for a ride to drop you off and pick you up, in the instance, you are unable to drive home, however you should be able to function as you normally would after the procedure     Diabetic Medication Instructions  Hold oral diabetic medication in morning of your procedure and for 48 hours after IV contrast is given  Typical instructions for insulin diabetic medication holding are below. However, please reach out to your Primary Care Provider or Endocrinologist for specific instructions  DO NOT take any oral diabetic medication, short-acting diabetes medications/insulin, humalog or regular insulin the morning of your test  Take   dose of long-acting insulin (Lantus, Levemir) the day of your test  Remember to bring your glucometer and insulin with you to take after your test if needed  GLP-1 Agonists Instructions  DO NOT take injectable GLP-1 agonists semaglutide (Ozempic, Wegovy), dulaglutide (Trulicity), exenatide ER (Bydureon), tirzepatide (Mounjaro), or oral semaglutide (Rybelsus) for 7 days prior your procedure  Hold once daily injectable GLP-1 agonists exenatide (Byetta), liraglutide (Saxenda, Victoza), lixisenatide (Soligua) the day before  and day of your procedure                Anticoagulation Medication Instructions   NA    You will need to follow up with one of our cardiology APPs 1-2 weeks after your procedure. If you need help scheduling or rescheduling your appointment, please call [phone]    Miki Richardson RN on 2/28/2024 at 12:14 PM

## 2024-02-29 ENCOUNTER — PATIENT OUTREACH (OUTPATIENT)
Dept: GASTROENTEROLOGY | Facility: CLINIC | Age: 69
End: 2024-02-29
Payer: MEDICARE

## 2024-02-29 DIAGNOSIS — Z12.11 COLON CANCER SCREENING: Primary | ICD-10-CM

## 2024-02-29 ASSESSMENT — PATIENT HEALTH QUESTIONNAIRE - PHQ9
SUM OF ALL RESPONSES TO PHQ QUESTIONS 1-9: 19
SUM OF ALL RESPONSES TO PHQ QUESTIONS 1-9: 19
10. IF YOU CHECKED OFF ANY PROBLEMS, HOW DIFFICULT HAVE THESE PROBLEMS MADE IT FOR YOU TO DO YOUR WORK, TAKE CARE OF THINGS AT HOME, OR GET ALONG WITH OTHER PEOPLE: VERY DIFFICULT

## 2024-02-29 NOTE — PROGRESS NOTES
"                [facility]      Women's Group Therapy, N=3  Co-facilitators:  Navin Poon and Baljinder Cordova  Start time:  10:30am   End Time: 11:55 am  Diagnoses:  Bi-polar-II, Generalized anxiety, PTSD, Alcohol dependency-in remission 1.5 years after 20 years of sobriety.    Date: 2/27/2024     Group:  S/O: Reviewed Homework and leftover topics from last week. Set new homework for the next group session       1. Impacts of mental health on cognitive decline and dementia     One  shared about how performing cognitive exercises can help prevent dementia. One patient shared about how she's been able to recognize the effects of depression  on her cognition, making it harder to think and making her more forgetful. She shared about her aunt who had dementia. Winnie shared about their  who has experienced cognitive decline and how that's impacted them. It's made it difficult to want to be in a relationship with him still and not just \"give up\".         2. Struggling with transferring skills from group to outside world     When discussing the \"mental exercise\" gym to help with cognition, compared the group therapy as a time to practice emotional skills. One patient shared that she feels comfortable in the group, but that she struggles to utilize the skills outside group because other people aren't respectful, When they don't respond well, she gets triggered and shuts down. Winnie agreed and shared about difficulties with her mental health and doing what she knows she should do.     3. Self care and coping with grief     Winnie shared about struggling with self care when they are feeling down or giving too much for others. This was in the context of her relationship with her . One patient provided support and normalization. She shared about how tigre was something important for her. Another group member shared about their birds and plants, and how " nurturing others helps them to feel needed and gives them pride.      4. Making connections      Discussed the importance of making connections to help prevent cognitive decline and improve mood. Another patient shared about making small connections with people in the grocery store. One patient shared that she's going to a [employer] game with a friend. Winnie shared about losing her therapist Mary Kay Gomez, and how this has triggered all sorts of feelings of abandonment.      Assessment:  Patient came a few minutes late. She was verbal and engaging. She became tearful at several points during the session, which she reported was normal for her. She also mentioned that she might not make the next two sessions due to being double booked with her individual therapist and/or TMS, which she is also starting.      Plan: Continue weekly group therapy to work on goals. See treatment plan      Homework: make the most of the remaining 4 sessions with her individual therapist vs pre-mourning

## 2024-02-29 NOTE — PROGRESS NOTES
Post Colonoscopy on 2/21/21 with Dr. Tolbert.      Follow-up recommendations:    - Will recommend surveillance colonoscopy in 3-5 years as before (multiple polyps were removed during last colonoscopy in 2017)    Called patient to discuss. She would like to proceed with scheduling colonoscopy. Orders placed for procedure. Advised that endoscopy schedulers would reach out in the coming days to arrange.      Magalys Gonzalez, RN Care Coordinator

## 2024-03-01 ENCOUNTER — VIRTUAL VISIT (OUTPATIENT)
Dept: PSYCHOLOGY | Facility: CLINIC | Age: 69
End: 2024-03-01
Payer: MEDICARE

## 2024-03-01 ENCOUNTER — TELEPHONE (OUTPATIENT)
Dept: PSYCHIATRY | Facility: CLINIC | Age: 69
End: 2024-03-01
Payer: MEDICARE

## 2024-03-01 DIAGNOSIS — F43.9 TRAUMA AND STRESSOR-RELATED DISORDER: ICD-10-CM

## 2024-03-01 DIAGNOSIS — F31.81 BIPOLAR 2 DISORDER (H): ICD-10-CM

## 2024-03-01 DIAGNOSIS — F41.1 GENERALIZED ANXIETY DISORDER: Primary | ICD-10-CM

## 2024-03-01 PROCEDURE — 90837 PSYTX W PT 60 MINUTES: CPT | Mod: 95 | Performed by: SOCIAL WORKER

## 2024-03-01 NOTE — PROGRESS NOTES
[facility]                                     Progress Note    Patient Name: Randi Cleary  Date: 3/1/24         Service Type: Individual     Session Start Time: 9:02 AM Session End Time: 9:58 AM     Session Length: 56 minutes    Session #: 239    Attendees: Client attended alone    Service Modality:  Video Visit:      Provider verified identity through the following two step process.  Patient provided:  Patient is known previously to provider    Telemedicine Visit: The patient's condition can be safely assessed and treated via synchronous audio and visual telemedicine encounter.      Reason for Telemedicine Visit: Patient convenience (e.g. access to timely appointments / distance to available provider)    Originating Site (Patient Location): Patient's home    Distant Site (Provider Location): Provider Remote Setting- Home Office    Consent:  The patient/guardian has verbally consented to: the potential risks and benefits of telemedicine (video visit) versus in person care; bill my insurance or make self-payment for services provided; and responsibility for payment of non-covered services.     Patient would like the video invitation sent by:  My Chart    Mode of Communication:  Video Conference via [facility]    Distant Location (Provider):  Off-site    As the provider I attest to compliance with applicable laws and regulations related to telemedicine.             DATA  Extended Session (53+ minutes): PROLONGED SERVICE IN THE OUTPATIENT SETTING REQUIRING DIRECT (FACE-TO-FACE) PATIENT CONTACT BEYOND THE USUAL SERVICE:    - High distress and under complex circumstances.  See Data section for details  Interactive Complexity: No  Crisis: No        Progress Since Last Session (Related to Symptoms / Goals / Homework):   Symptoms:  Patient is presenting in a calmer state with a clearer plan of next steps      Homework:  Progress made    Write out thoughts on transitions rather than avoiding  them  Talk to dietician about calcium  Schedule dental work  Start TMS  Continue women's group     Look at taking one thing at the time  Continue to process past     Episode of Care Goals: Satisfactory progress - ACTION (Actively working towards change); Intervened by reinforcing change plan / affirming steps taken     Current / Ongoing Stressors and Concerns:   Patient is currently socially isolated. She has a conflictual relationship with her .  She is getting minimal physical activity.  She has had several surgeries.      Treatment Objective(s) Addressed in This Session:   Patient will increase frequency of engaging her in ADLs.  Patient will track and record at least 5 pleasant exchanges with . Patient will be able to identify at least 5 positive traits about her .  Patient will reduce level of depressive and anxious features as evidenced by reduction in score on her CHAVO-7 and PHQ-9 (scores of 15 and 16 at first measurment, respectively).     Intervention:   Supportive Therapy:  Processed loneliness and challenges within friendships. Looked at family and impact on her. Discussed relationship with her . Talked about symptoms and how they are impacting her, as well as coping skills used.       The following assessments were completed by patient for this visit:  PHQ9:       12/29/2023    12:48 PM 1/15/2024    11:57 AM 1/22/2024    11:59 AM 1/28/2024    10:59 PM 2/7/2024    11:08 AM 2/22/2024    12:13 PM 2/29/2024     9:52 AM   PHQ-9 SCORE   PHQ-9 Total Score MyChart  17 (Moderately severe depression) 15 (Moderately severe depression) 19 (Moderately severe depression)  18 (Moderately severe depression) 19 (Moderately severe depression)   PHQ-9 Total Score 20 17 15 19 16 18 19     GAD7:       9/20/2023     9:59 AM 10/9/2023     8:55 AM 10/31/2023     4:25 AM 12/1/2023    11:37 AM 12/18/2023     3:09 PM 1/15/2024    12:02 PM 1/29/2024     3:10 AM   CHAVO-7 SCORE   Total Score 16 (severe  anxiety) 15 (severe anxiety) 15 (severe anxiety) 14 (moderate anxiety) 16 (severe anxiety) 14 (moderate anxiety) 16 (severe anxiety)   Total Score 16    16    16 15 15 14 16 14 16     PROMIS 10-Global Health (only subscores and total score):       7/21/2023    10:36 AM 7/28/2023     3:24 PM 8/7/2023     1:07 PM 9/20/2023    10:13 AM 10/31/2023     4:36 AM 12/1/2023    11:52 AM 1/22/2024    12:00 PM   PROMIS-10 Scores Only   Global Mental Health Score 5    5  6    6     5    5 6  5   Global Physical Health Score 7    7  8    8     9    9 8  9   PROMIS TOTAL - SUBSCORES 12    12  14    14     14    14 14  14       Information is confidential and restricted. Go to Review Flowsheets to unlock data.    Multiple values from one day are sorted in reverse-chronological order        ASSESSMENT: Current Emotional / Mental Status (status of significant symptoms):   Risk status (Self / Other harm or suicidal ideation)   Patient denies current fears or concerns for personal safety.   Patient denies current or recent suicidal ideation or behaviors.   Patient denies current or recent homicidal ideation or behaviors.   Patient denies current or recent self injurious behavior or ideation.   Patient denies other safety concerns.   Patient reports there has been no change in risk factors since their last session.     Patient reports there has been no change in protective factors since their last session.     A safety and risk management plan has been developed including: Patient consented to co-developed safety plan on 11/24/2020, updated 2/20/23.  Safety and risk management plan was reviewed.   Patient agreed to use safety plan should any safety concerns arise.  A copy was made available to the patient.     Appearance:   Appropriate    Eye Contact:   Good    Psychomotor Behavior: Normal    Attitude:   Cooperative    Orientation:   All   Speech    Rate / Production: Normal/ Responsive    Volume:  Normal    Mood:    Anxious     Affect:    Appropriate    Thought Content:  Clear    Thought Form:  Coherent    Insight:    Good      Medication Review:   No changes to current psychiatric medication(s)      Medication Compliance:   Yes     Changes in Health Issues:   None reported     Chemical Use Review:   Substance Use: Chemical use reviewed, no active concerns identified Nothing used since 2021.     Tobacco Use: No current tobacco use.      Diagnosis:  1. Generalized anxiety disorder    2. Trauma and stressor-related disorder    3. Bipolar 2 disorder (H)    Borderline personality disorder vs. Bipolar 2      Collateral Reports Completed:   Not Applicable    PLAN: (Patient Tasks / Therapist Tasks / Other)  Write out thoughts on transitions rather than avoiding them  Talk to dietician about calcium  Schedule dental work  Start TMS  Continue women's group     Look at taking one thing at the time  Continue to process past        There has been demonstrated improvement in functioning while patient has been engaged in psychotherapy/psychological service- if withdrawn the patient would deteriorate and/or relapse.     MICAELA SLADE, [facility]   2024                                                        ______________________________________________________________________    Individual Treatment Plan    Patient's Name: Randi Cleary  YOB: 1955    Date of Creation: 20  Date Treatment Plan Last Reviewed/Revised: 23    DSM5 Diagnoses: 296.89 Bipolar II Disorder Depressed, 300.02 (F41.1) Generalized Anxiety Disorder, or Adjustment Disorders  309.89 (F43.8) Other Specified Trauma and Stressor Related Disorder  Psychosocial / Contextual Factors: Patient's entire family of origin has , she now has a sister-in-law and  as support.  Relationship with  is conflictual. She is recovering from surgeries  PROMIS (reviewed every 90 days): PROMIS-10 Scores  PROMIS 10-Global Health (only subscores and  "total score):   PROMIS-10 Scores Only 12/14/2021 3/15/2022 3/15/2022 3/15/2022 3/24/2022 4/1/2022 6/9/2022   Global Mental Health Score 6 6 6 6 8 12 12   Global Physical Health Score 9 9 9 9 8 11 10   PROMIS TOTAL - SUBSCORES 15 15 15 15 16 23 22       Referral / Collaboration:  Referral to another professional/service is not indicated at this time..    Anticipated number of session for this episode of care: 50  Anticipation frequency of session: Biweekly  Anticipated Duration of each session: 53 or more minutes due to intensity of trauma symptoms  Treatment plan will be reviewed in 90 days or when goals have been changed.   There has been demonstrated improvement in functioning while patient has been engaged in psychotherapy/psychological service- if withdrawn the patient would deteriorate and/or relapse.       MeasurableTreatment Goal(s) related to diagnosis / functional impairment(s)  Goal 1: Patient will \"jumpstart, getting going with the things I need to be doing around the house as far as picking up, doing things, trying to do something every day.  Also to lessen the animosity between me and my .\"    I will know I've met my goal when my shoulders are fixed and I can see.      Objective #A (Patient Action)    Patient will  increase frequency of engaging her in ADLs .  Status: Continued - Date(s): 12/10/21, 3/9/22, 6/9/22, 9/2/22, 12/1/22, 3/2/23, 6/6/23, 9/13/23, 12/14/23    Intervention(s)  Therapist will  engage patient in CBT, specifically behavioral activation .    Objective #B  Patient will   track and record at least 5 pleasant exchanges with . Patient will be able to identify at least 5 positive traits about her  and how he relates to her .  Status: Continued - Date(s): 12/10/21, 3/9/22, 6/9/22, 9/2/22, 12/1/22, 3/2/23, 6/6/23, 9/13/23, 12/14/23    Intervention(s)  Therapist will  teach assertiveness skills and assign homework related to relationship interactions .    Objective " "#C  Patient will  reduce level of depressive and anxious features as evidenced by reduction in score on her CHAVO-7 and PHQ-9 (scores of 15 and 16 at first measurment, respectively) .  Status: Continued - Date(s): 12/10/21, 3/9/22, 6/9/22, 9/2/22, 12/1/22, 3/2/23, 6/6/23, 9/13/23, 12/14/23    Intervention(s)  Therapist will  engage patient in person-centered therapy and CBT .    Patient has reviewed and agreed to the above plan.      MICAELA SLADE, [facility]  December 14, 2023                                                   Randi Cleary          SAFETY PLAN:  Step 1: Warning signs / cues (Thoughts, images, mood, situation, behavior) that a crisis may be developing:  Thoughts: \"I don't want to continue\" \"I am unwanted\"  Images: none  Thinking Processes: ruminating  Mood: anger  Behaviors: isolating/withdrawing , can't stop crying, not taking care of myself and not taking care of my responsibilities  Situations: small triggers, such as not being able to find something, or dropping something   Step 2: Coping strategies - Things I can do to take my mind off of my problems without contacting another person (relaxation technique, physical activity):  Distress Tolerance Strategies:  arts and crafts: drawing, play with my pet , listen to positive and upbeat music: any, change body temperature (ice pack/cold water)  and paced breathing/progressive muscle relaxation  Physical Activities: go for a walk, deep breathing and stretching   Focus on helpful thoughts:  \"You've been through this before, you can get through it again.\"  Step 3: People and social settings that provide distraction:                 Name: Carmen                            Name: Darien                           Name: Aleida       pool, shopping, Carmen's house, [employer]       Step 4: Remind myself of people and things that are important to me and worth living for:  Clifford Little Donna, post-COVID world, options of what could be in your future        Step " 5: When I am in crisis, I can ask these people to help me use my safety plan:                 Name: Sidney  Step 6: Making the environment safe:   go to sleep/daydream  Step 7: Professionals or agencies I can contact during a crisis:  [facility] Daytime Number: [phone]  Suicide Prevention Lifeline: 4-030-507-TALK (8255)  Crisis Text Line Service (available 24 hours a day, 7 days a week): Text [place] to 442649  Local Crisis Services: [facility] Crisis: [phone]  Adults can always access to the emPATH unit at [facility] (no phone number, utilize it like an urgent care or ER where you just show up)     Call 911 or go to my nearest emergency department.       I helped develop this safety plan and agree to use it when needed.  I have been given a copy of this plan.       Client signature _________________________________________________________________  Today s date:  11/24/2020  Adapted from Safety Plan Template 2008 Randi Poole and Robby Barba is reprinted with the express permission of the authors.  No portion of the Safety Plan Template may be reproduced without the express, written permission.  You can contact the authors at [email or web address] or [email or web address].
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
## 2024-03-01 NOTE — TELEPHONE ENCOUNTER
Jeannette Mendoza, DION CNP  P Psychiatry Nurse-[facility]!     Would one of you mind contacting Winnie on Friday? She reported a rash (sounds more like skin redness - I couldn't see a rash today during our video visit) on her forearms. We stopped lamotrigine last week as a preventative measure. I want to make sure the redness resolves and/or doesn't worsen. She knows she should go to UC or same day PCP if rash worsens.     Thank you!   Jeannette

## 2024-03-01 NOTE — TELEPHONE ENCOUNTER
Writer called Winnie to check-in about the redness on her arms. She reports it is still present but has not worsened. She denies itching, pain, or inflammation. Winnie understands that if the redness spreads or worsens, she should go to urgent care and/or meet with her PCP. She denies any concerns at this time and will reach out via [facility] if concerns develop.    Kimberly Zavala RN on 3/1/2024 at 11:11 AM

## 2024-03-05 ENCOUNTER — TELEPHONE (OUTPATIENT)
Dept: PSYCHOLOGY | Facility: CLINIC | Age: 69
End: 2024-03-05
Payer: MEDICARE

## 2024-03-05 ENCOUNTER — OFFICE VISIT (OUTPATIENT)
Dept: PSYCHOLOGY | Facility: CLINIC | Age: 69
End: 2024-03-05
Payer: MEDICARE

## 2024-03-05 DIAGNOSIS — F41.1 GENERALIZED ANXIETY DISORDER: Primary | ICD-10-CM

## 2024-03-05 DIAGNOSIS — F43.9 TRAUMA AND STRESSOR-RELATED DISORDER: ICD-10-CM

## 2024-03-05 PROCEDURE — 90834 PSYTX W PT 45 MINUTES: CPT | Performed by: SOCIAL WORKER

## 2024-03-05 ASSESSMENT — ANXIETY QUESTIONNAIRES
GAD7 TOTAL SCORE: 13
6. BECOMING EASILY ANNOYED OR IRRITABLE: NEARLY EVERY DAY
GAD7 TOTAL SCORE: 13
8. IF YOU CHECKED OFF ANY PROBLEMS, HOW DIFFICULT HAVE THESE MADE IT FOR YOU TO DO YOUR WORK, TAKE CARE OF THINGS AT HOME, OR GET ALONG WITH OTHER PEOPLE?: VERY DIFFICULT
2. NOT BEING ABLE TO STOP OR CONTROL WORRYING: SEVERAL DAYS
7. FEELING AFRAID AS IF SOMETHING AWFUL MIGHT HAPPEN: SEVERAL DAYS
4. TROUBLE RELAXING: MORE THAN HALF THE DAYS
3. WORRYING TOO MUCH ABOUT DIFFERENT THINGS: MORE THAN HALF THE DAYS
5. BEING SO RESTLESS THAT IT IS HARD TO SIT STILL: SEVERAL DAYS
1. FEELING NERVOUS, ANXIOUS, OR ON EDGE: NEARLY EVERY DAY
7. FEELING AFRAID AS IF SOMETHING AWFUL MIGHT HAPPEN: SEVERAL DAYS
IF YOU CHECKED OFF ANY PROBLEMS ON THIS QUESTIONNAIRE, HOW DIFFICULT HAVE THESE PROBLEMS MADE IT FOR YOU TO DO YOUR WORK, TAKE CARE OF THINGS AT HOME, OR GET ALONG WITH OTHER PEOPLE: VERY DIFFICULT
GAD7 TOTAL SCORE: 13

## 2024-03-05 ASSESSMENT — PATIENT HEALTH QUESTIONNAIRE - PHQ9
SUM OF ALL RESPONSES TO PHQ QUESTIONS 1-9: 19
10. IF YOU CHECKED OFF ANY PROBLEMS, HOW DIFFICULT HAVE THESE PROBLEMS MADE IT FOR YOU TO DO YOUR WORK, TAKE CARE OF THINGS AT HOME, OR GET ALONG WITH OTHER PEOPLE: VERY DIFFICULT
SUM OF ALL RESPONSES TO PHQ QUESTIONS 1-9: 19
10. IF YOU CHECKED OFF ANY PROBLEMS, HOW DIFFICULT HAVE THESE PROBLEMS MADE IT FOR YOU TO DO YOUR WORK, TAKE CARE OF THINGS AT HOME, OR GET ALONG WITH OTHER PEOPLE: VERY DIFFICULT

## 2024-03-05 NOTE — TELEPHONE ENCOUNTER
Spoke to patient about possibly transferring to another therapist, Reina Hoang. Patient was open to this if necessary, but preferred to find someone closer to home to keep the possibility of in person sessions occasionally an option. Agreed to keep reaching out and to follow up in the future.

## 2024-03-05 NOTE — PROGRESS NOTES
[facility]                                     Progress Note    Patient Name: Randi Cleary  Date: 3/5/24         Service Type: Individual     Session Start Time: 9:03 AM Session End Time: 9:45 AM     Session Length: 42 minutes    Session #: 240    Attendees: Client attended alone    Service Modality:  In-person                DATA  Extended Session (53+ minutes): PROLONGED SERVICE IN THE OUTPATIENT SETTING REQUIRING DIRECT (FACE-TO-FACE) PATIENT CONTACT BEYOND THE USUAL SERVICE:    - High distress and under complex circumstances.  See Data section for details  Interactive Complexity: No  Crisis: No        Progress Since Last Session (Related to Symptoms / Goals / Homework):   Symptoms:  Patient is feeling hopeful about some of her steps of prioritizing her mental health and spending time with friends      Homework:  Progress made    Write out thoughts on transitions rather than avoiding them  Talk to dietician about calcium  Schedule dental work -not done  Start TMS- scheduled 3/6  Continue women's group     Look at taking one thing at the time  Continue to process past     Episode of Care Goals: Satisfactory progress - ACTION (Actively working towards change); Intervened by reinforcing change plan / affirming steps taken     Current / Ongoing Stressors and Concerns:   Patient is currently socially isolated. She has a conflictual relationship with her .  She is getting minimal physical activity.  She has had several surgeries.      Treatment Objective(s) Addressed in This Session:   Patient will increase frequency of engaging her in ADLs.  Patient will track and record at least 5 pleasant exchanges with . Patient will be able to identify at least 5 positive traits about her .  Patient will reduce level of depressive and anxious features as evidenced by reduction in score on her CHAVO-7 and PHQ-9 (scores of 15 and 16 at first measurment,  respectively).     Intervention:   Supportive Therapy:  Processed time with friends and impact on her mood. Discussed history with family and impacts on present. Processed relationship stressors and ways she is managing them. Reviewed next steps with mental health.         The following assessments were completed by patient for this visit:  PHQ9:       1/15/2024    11:57 AM 1/22/2024    11:59 AM 1/28/2024    10:59 PM 2/7/2024    11:08 AM 2/22/2024    12:13 PM 2/29/2024     9:52 AM 3/4/2024    10:47 PM   PHQ-9 SCORE   PHQ-9 Total Score MyChart 17 (Moderately severe depression) 15 (Moderately severe depression) 19 (Moderately severe depression)  18 (Moderately severe depression) 19 (Moderately severe depression) 18 (Moderately severe depression)   PHQ-9 Total Score 17 15 19 16 18 19 18     GAD7:       9/20/2023     9:59 AM 10/9/2023     8:55 AM 10/31/2023     4:25 AM 12/1/2023    11:37 AM 12/18/2023     3:09 PM 1/15/2024    12:02 PM 1/29/2024     3:10 AM   CHAVO-7 SCORE   Total Score 16 (severe anxiety) 15 (severe anxiety) 15 (severe anxiety) 14 (moderate anxiety) 16 (severe anxiety) 14 (moderate anxiety) 16 (severe anxiety)   Total Score 16    16    16 15 15 14 16 14 16     PROMIS 10-Global Health (only subscores and total score):       7/21/2023    10:36 AM 7/28/2023     3:24 PM 8/7/2023     1:07 PM 9/20/2023    10:13 AM 10/31/2023     4:36 AM 12/1/2023    11:52 AM 1/22/2024    12:00 PM   PROMIS-10 Scores Only   Global Mental Health Score 5    5  6    6     5    5 6  5   Global Physical Health Score 7    7  8    8     9    9 8  9   PROMIS TOTAL - SUBSCORES 12    12  14    14     14    14 14  14       Information is confidential and restricted. Go to Review Flowsheets to unlock data.    Multiple values from one day are sorted in reverse-chronological order        ASSESSMENT: Current Emotional / Mental Status (status of significant symptoms):   Risk status (Self / Other harm or suicidal ideation)   Patient denies  current fears or concerns for personal safety.   Patient denies current or recent suicidal ideation or behaviors.   Patient denies current or recent homicidal ideation or behaviors.   Patient denies current or recent self injurious behavior or ideation.   Patient denies other safety concerns.   Patient reports there has been no change in risk factors since their last session.     Patient reports there has been no change in protective factors since their last session.     A safety and risk management plan has been developed including: Patient consented to co-developed safety plan on 11/24/2020, updated 2/20/23.  Safety and risk management plan was reviewed.   Patient agreed to use safety plan should any safety concerns arise.  A copy was made available to the patient.     Appearance:   Appropriate    Eye Contact:   Good    Psychomotor Behavior: Normal    Attitude:   Cooperative    Orientation:   All   Speech    Rate / Production: Normal/ Responsive    Volume:  Normal    Mood:    Anxious    Affect:    Appropriate    Thought Content:  Clear    Thought Form:  Coherent    Insight:    Good      Medication Review:   No changes to current psychiatric medication(s)      Medication Compliance:   Yes     Changes in Health Issues:   None reported     Chemical Use Review:   Substance Use: Chemical use reviewed, no active concerns identified Nothing used since August 2021.     Tobacco Use: No current tobacco use.      Diagnosis:  1. Generalized anxiety disorder    2. Trauma and stressor-related disorder    Borderline personality disorder vs. Bipolar 2      Collateral Reports Completed:   Not Applicable    PLAN: (Patient Tasks / Therapist Tasks / Other)  Write out thoughts on transitions rather than avoiding them  Talk to dietician about calcium  Schedule dental work  Start TMS  Continue women's group     Look at taking one thing at the time  Continue to process past        There has been demonstrated improvement in functioning  "while patient has been engaged in psychotherapy/psychological service- if withdrawn the patient would deteriorate and/or relapse.     B MICAELA BAKER, [facility]   2024                                                        ______________________________________________________________________    Individual Treatment Plan    Patient's Name: Randi Cleary  YOB: 1955    Date of Creation: 20  Date Treatment Plan Last Reviewed/Revised: 23    DSM5 Diagnoses: 296.89 Bipolar II Disorder Depressed, 300.02 (F41.1) Generalized Anxiety Disorder, or Adjustment Disorders  309.89 (F43.8) Other Specified Trauma and Stressor Related Disorder  Psychosocial / Contextual Factors: Patient's entire family of origin has , she now has a sister-in-law and  as support.  Relationship with  is conflictual. She is recovering from surgeries  PROMIS (reviewed every 90 days): PROMIS-10 Scores  PROMIS 10-Global Health (only subscores and total score):   PROMIS-10 Scores Only 2021 3/15/2022 3/15/2022 3/15/2022 3/24/2022 2022 2022   Global Mental Health Score 6 6 6 6 8 12 12   Global Physical Health Score 9 9 9 9 8 11 10   PROMIS TOTAL - SUBSCORES 15 15 15 15 16 23 22       Referral / Collaboration:  Referral to another professional/service is not indicated at this time..    Anticipated number of session for this episode of care: 50  Anticipation frequency of session: Biweekly  Anticipated Duration of each session: 53 or more minutes due to intensity of trauma symptoms  Treatment plan will be reviewed in 90 days or when goals have been changed.   There has been demonstrated improvement in functioning while patient has been engaged in psychotherapy/psychological service- if withdrawn the patient would deteriorate and/or relapse.       MeasurableTreatment Goal(s) related to diagnosis / functional impairment(s)  Goal 1: Patient will \"jumpstart, getting going with the things I need to " "be doing around the house as far as picking up, doing things, trying to do something every day.  Also to lessen the animosity between me and my .\"    I will know I've met my goal when my shoulders are fixed and I can see.      Objective #A (Patient Action)    Patient will  increase frequency of engaging her in ADLs .  Status: Continued - Date(s): 12/10/21, 3/9/22, 6/9/22, 9/2/22, 12/1/22, 3/2/23, 6/6/23, 9/13/23, 12/14/23    Intervention(s)  Therapist will  engage patient in CBT, specifically behavioral activation .    Objective #B  Patient will   track and record at least 5 pleasant exchanges with . Patient will be able to identify at least 5 positive traits about her  and how he relates to her .  Status: Continued - Date(s): 12/10/21, 3/9/22, 6/9/22, 9/2/22, 12/1/22, 3/2/23, 6/6/23, 9/13/23, 12/14/23    Intervention(s)  Therapist will  teach assertiveness skills and assign homework related to relationship interactions .    Objective #C  Patient will  reduce level of depressive and anxious features as evidenced by reduction in score on her CHAVO-7 and PHQ-9 (scores of 15 and 16 at first measurment, respectively) .  Status: Continued - Date(s): 12/10/21, 3/9/22, 6/9/22, 9/2/22, 12/1/22, 3/2/23, 6/6/23, 9/13/23, 12/14/23    Intervention(s)  Therapist will  engage patient in person-centered therapy and CBT .    Patient has reviewed and agreed to the above plan.      MICAELA SLADE, [facility]  December 14, 2023                                                   Randi Cleary          SAFETY PLAN:  Step 1: Warning signs / cues (Thoughts, images, mood, situation, behavior) that a crisis may be developing:  Thoughts: \"I don't want to continue\" \"I am unwanted\"  Images: none  Thinking Processes: ruminating  Mood: anger  Behaviors: isolating/withdrawing , can't stop crying, not taking care of myself and not taking care of my responsibilities  Situations: small triggers, such as not being able to find " "something, or dropping something   Step 2: Coping strategies - Things I can do to take my mind off of my problems without contacting another person (relaxation technique, physical activity):  Distress Tolerance Strategies:  arts and crafts: drawing, play with my pet , listen to positive and upbeat music: any, change body temperature (ice pack/cold water)  and paced breathing/progressive muscle relaxation  Physical Activities: go for a walk, deep breathing and stretching   Focus on helpful thoughts:  \"You've been through this before, you can get through it again.\"  Step 3: People and social settings that provide distraction:                 Name: Carmen                            Name: Darien                           Name: Aleida       pool, shopping, Carmen's house, [employer]       Step 4: Remind myself of people and things that are important to me and worth living for:  Clifford Little Donna, post-COVID world, options of what could be in your future        Step 5: When I am in crisis, I can ask these people to help me use my safety plan:                 Name: Sidney  Step 6: Making the environment safe:   go to sleep/daydream  Step 7: Professionals or agencies I can contact during a crisis:  [facility] Daytime Number: [phone]  Suicide Prevention Lifeline: 2-856-648-TALK (8028)  Crisis Text Line Service (available 24 hours a day, 7 days a week): Text [place] to 426746  Local Crisis Services: [facility] Crisis: [phone]  Adults can always access to the emPATH unit at [facility] (no phone number, utilize it like an urgent care or ER where you just show up)     Call 911 or go to my nearest emergency department.       I helped develop this safety plan and agree to use it when needed.  I have been given a copy of this plan.       Client signature _________________________________________________________________  Today s date:  11/24/2020  Adapted from Safety Plan Template Lilliam Bryan " Carli Barba is reprinted with the express permission of the authors.  No portion of the Safety Plan Template may be reproduced without the express, written permission.  You can contact the authors at [email or web address] or [email or web address].

## 2024-03-06 ENCOUNTER — VIRTUAL VISIT (OUTPATIENT)
Dept: PSYCHOLOGY | Facility: CLINIC | Age: 69
End: 2024-03-06
Payer: MEDICARE

## 2024-03-06 ENCOUNTER — ALLIED HEALTH/NURSE VISIT (OUTPATIENT)
Dept: PSYCHIATRY | Facility: CLINIC | Age: 69
End: 2024-03-06
Payer: MEDICARE

## 2024-03-06 ENCOUNTER — OFFICE VISIT (OUTPATIENT)
Dept: PSYCHIATRY | Facility: CLINIC | Age: 69
End: 2024-03-06
Payer: MEDICARE

## 2024-03-06 DIAGNOSIS — F41.1 GENERALIZED ANXIETY DISORDER: Primary | ICD-10-CM

## 2024-03-06 DIAGNOSIS — F31.81 BIPOLAR 2 DISORDER (H): ICD-10-CM

## 2024-03-06 DIAGNOSIS — F33.2 SEVERE EPISODE OF RECURRENT MAJOR DEPRESSIVE DISORDER, WITHOUT PSYCHOTIC FEATURES (H): Primary | ICD-10-CM

## 2024-03-06 DIAGNOSIS — F43.9 TRAUMA AND STRESSOR-RELATED DISORDER: ICD-10-CM

## 2024-03-06 PROCEDURE — 90834 PSYTX W PT 45 MINUTES: CPT | Mod: 95 | Performed by: SOCIAL WORKER

## 2024-03-06 NOTE — PROGRESS NOTES
TMS Education     Randi Cleary MRN# [id number]  Age: 68 year old year old YOB: 1955        Patient is here in clinic for TMS education and consenting.  Patient will be starting TMS today on the [employer].  Writer and patient reviewed mechanics of TMS.  Discussed using magnetic pulses to stimulate and induce an electrical field inside the brain.  Discussed the difference between F8 and H1 coil.  Patient was able to verbalize understanding of this.  Discussed that the idea is that we are treating the DLPFC of the brain, as it has been shown by in studies that people who have depression have decreased activity in this part of the brain, the idea is that we stimulate this part of the brain to increase activity.       Reviewed processing of mapping and how visit today would go.  Encouraged patient to communicate with staff if treatment at anytime became painful.         Discussed side effects of TMS.  Side effects include headaches after treatment, hearing loss, lightheadedness/dizziness, short lived vision changes, and seizures.  Discussed ways that TMS Clinic helps with preventing these side effects.  Such as retesting motor thresholds and having patient wear ear plugs.  Instructed patient that she can take OTC pain relievers such as tylenol or IBU if she has a headache after today's treatment.  Reviewed safety concerns regarding sleep and use of illegal drugs/alcohol.        Patient was able to ask questions regarding procedure.  Patient informed of 10 minute late policy and attendance policy.  Consent was signed by patient today.

## 2024-03-06 NOTE — PROGRESS NOTES
" Interventional Psychiatry Program  [address], [address]  [place] [address]  TMS Procedure Note   Randi Cleary MRN# [id number]  Age: 68 year old    YOB: 1955    Winnie Cleary comes into clinic today at the request of, Arnaldo Varela MD, ordering provider for TMS treatments.     Pre-Procedure:  History and Physical: Reviewed in medical record  Consent signed by: Randi Cleary for this course of treatment on: 3/6/2024    Reviewed possible side effects associated with treatment as well as questions regarding possible course of treatments.  Patient agreed to procedure and was able to reflect implications.    Clinical Narrative:  Patient presents to initiate an acute course of TMS for management of her symptoms of resistant depression. Did not treat today.    Indications for TMS:  MDD, recurrent, severe; 4+ medication trials (from 2+ classes) ineffective; Psychotherapy ineffective     Procedure Diagnosis:  MDD, severe, recurrent, F33.2    Treatment Hx:  Treatment number this series: 1  Total lifetime treatment number: 1    Allergies   Allergen Reactions    Serotonin Reuptake Inhibitors (Ssris) Anxiety, Difficulty breathing, Headache, Palpitations and Shortness Of Breath    Buspirone      The patient states she had serotonin syndrome    Cephalexin      Other reaction(s): unknown rxn.    Desvenlafaxine      Serotonin syndrome    Diclofenac Sodium [Diclofenac]      Serotonin syndrome and restless legs syndrome    Gabapentin      Drove on the wrong side of the highway    Levofloxacin      \"CAN'T REMEMBER\"    Penicillins      \"SORES IN MOUTH\"    Riluzole Difficulty breathing and Swelling    Sulfa Antibiotics      \"PT DOES NOT KNOW WHAT THE REACTION WAS\"    Topiramate Other (See Comments)     Frequent urination      LMP  (LMP Unknown)     Pause for the Cause  Right patient: Yes  Right procedure/correct coil:  Yes; rTMS; [id number]; H1 coil.   Earplugs in place:  Yes    Procedure  Patient " was seated in procedure chair. Identity and procedure was verified. Ear plugs were placed in ears and patient-specific cap was placed on head and tightened appropriately. Ruler locations were verified. Bone conducting headphones were not used.  Coil was placed at 0 - eyebrow - 14 and stimulator was set to 55% (90% of MT).  Initial train was well tolerated so 55 trains were delivered.  Patient tolerated procedure well with minimal right eyebrow movement.      Motor Threshold Determination  Distance from nasion to inion: 35.5cm  MT 1: 0 - 7 - 14 @ 61% on 03/06/2024    Standard LDLPFC  Frequency: 18  Train Duration: 2  Total pulses delivered: 1980  Inter-train interval: 20  Tx Loc: 0 - eyebrows - 14    Energy: 55% (90%MT)  Trains: 55     Date MADRS QIDS PHQ-9   03/06/2024 33 18 21                                                 2/29/2024     9:52 AM 3/4/2024    10:47 PM 3/5/2024     2:49 PM   PHQ-9 SCORE   PHQ-9 Total Score MyChart 19 (Moderately severe depression) 18 (Moderately severe depression) 19 (Moderately severe depression)   PHQ-9 Total Score 19 18 19    19     Plan   - Increase energy as tolerated  - Continue TMS treatments    The service provided today was under the supervising provider of the day, Reva Hutchison MD, who determined motor threshold and was available if needed.     Venice Beckham, TMS Technician  [facility]  Mental [facility] Neuromodulation

## 2024-03-06 NOTE — PROGRESS NOTES
[facility]                                     Progress Note    Patient Name: Randi Cleary  Date: 3/6/24         Service Type: Individual     Session Start Time: 12:00 PM Session End Time: 12:45 PM     Session Length: 45 minutes    Session #: 241    Attendees: Client attended alone    Service Modality:  Video Visit:      Provider verified identity through the following two step process.  Patient provided:  Patient is known previously to provider    Telemedicine Visit: The patient's condition can be safely assessed and treated via synchronous audio and visual telemedicine encounter.      Reason for Telemedicine Visit: Patient convenience (e.g. access to timely appointments / distance to available provider)    Originating Site (Patient Location): Patient's home    Distant Site (Provider Location): Provider Remote Setting- Home Office    Consent:  The patient/guardian has verbally consented to: the potential risks and benefits of telemedicine (video visit) versus in person care; bill my insurance or make self-payment for services provided; and responsibility for payment of non-covered services.     Patient would like the video invitation sent by:  My Chart    Mode of Communication:  [facility]    Distant Location (Provider):  Off-site    As the provider I attest to compliance with applicable laws and regulations related to telemedicine.          DATA  Extended Session (53+ minutes): No  Interactive Complexity: No  Crisis: No        Progress Since Last Session (Related to Symptoms / Goals / Homework):   Symptoms:  Patient reports feeling hopeful with having started TMS      Homework:  Progress made    Write out thoughts on transitions rather than avoiding them  Talk to dietician about calcium  Schedule dental work -not done  Start TMS- scheduled 3/6  Continue women's group     Look at taking one thing at the time  Continue to process past     Episode of Care Goals:  Satisfactory progress - ACTION (Actively working towards change); Intervened by reinforcing change plan / affirming steps taken     Current / Ongoing Stressors and Concerns:   Patient is currently socially isolated. She has a conflictual relationship with her .  She is getting minimal physical activity.  She has had several surgeries.      Treatment Objective(s) Addressed in This Session:   Patient will increase frequency of engaging her in ADLs.  Patient will track and record at least 5 pleasant exchanges with . Patient will be able to identify at least 5 positive traits about her .  Patient will reduce level of depressive and anxious features as evidenced by reduction in score on her CHAVO-7 and PHQ-9 (scores of 15 and 16 at first measurment, respectively).     Intervention:   Supportive Therapy:  Reviewed changes with patient. Talked about TMS and how this is going. Looked at what was next for patient. Reviewed coping and connections.         The following assessments were completed by patient for this visit:  PHQ9:       1/28/2024    10:59 PM 2/7/2024    11:08 AM 2/22/2024    12:13 PM 2/29/2024     9:52 AM 3/4/2024    10:47 PM 3/5/2024     2:49 PM 3/6/2024    11:27 AM   PHQ-9 SCORE   PHQ-9 Total Score MyChart 19 (Moderately severe depression)  18 (Moderately severe depression) 19 (Moderately severe depression) 18 (Moderately severe depression) 19 (Moderately severe depression)    PHQ-9 Total Score 19 16 18 19 18 19    19 21     GAD7:       10/9/2023     8:55 AM 10/31/2023     4:25 AM 12/1/2023    11:37 AM 12/18/2023     3:09 PM 1/15/2024    12:02 PM 1/29/2024     3:10 AM 3/5/2024     3:08 PM   CHAVO-7 SCORE   Total Score 15 (severe anxiety) 15 (severe anxiety) 14 (moderate anxiety) 16 (severe anxiety) 14 (moderate anxiety) 16 (severe anxiety) 13 (moderate anxiety)   Total Score 15 15 14 16 14 16 13     PROMIS 10-Global Health (only subscores and total score):       7/21/2023    10:36 AM  7/28/2023     3:24 PM 8/7/2023     1:07 PM 9/20/2023    10:13 AM 10/31/2023     4:36 AM 12/1/2023    11:52 AM 1/22/2024    12:00 PM   PROMIS-10 Scores Only   Global Mental Health Score 5    5  6    6     5    5 6  5   Global Physical Health Score 7    7  8    8     9    9 8  9   PROMIS TOTAL - SUBSCORES 12    12  14    14     14    14 14  14       Information is confidential and restricted. Go to Review Flowsheets to unlock data.    Multiple values from one day are sorted in reverse-chronological order        ASSESSMENT: Current Emotional / Mental Status (status of significant symptoms):   Risk status (Self / Other harm or suicidal ideation)   Patient denies current fears or concerns for personal safety.   Patient denies current or recent suicidal ideation or behaviors.   Patient denies current or recent homicidal ideation or behaviors.   Patient denies current or recent self injurious behavior or ideation.   Patient denies other safety concerns.   Patient reports there has been no change in risk factors since their last session.     Patient reports there has been no change in protective factors since their last session.     A safety and risk management plan has been developed including: Patient consented to co-developed safety plan on 11/24/2020, updated 2/20/23.  Safety and risk management plan was reviewed.   Patient agreed to use safety plan should any safety concerns arise.  A copy was made available to the patient.     Appearance:   Appropriate    Eye Contact:   Good    Psychomotor Behavior: Normal    Attitude:   Cooperative    Orientation:   All   Speech    Rate / Production: Normal/ Responsive    Volume:  Normal    Mood:    Anxious    Affect:    Appropriate    Thought Content:  Clear    Thought Form:  Coherent    Insight:    Good      Medication Review:   No changes to current psychiatric medication(s)      Medication Compliance:   Yes     Changes in Health Issues:   None reported     Chemical Use  Review:   Substance Use: Chemical use reviewed, no active concerns identified Nothing used since 2021.     Tobacco Use: No current tobacco use.      Diagnosis:  1. Generalized anxiety disorder    2. Trauma and stressor-related disorder    3. Bipolar 2 disorder (H)    Borderline personality disorder vs. Bipolar 2      Collateral Reports Completed:   Not Applicable    PLAN: (Patient Tasks / Therapist Tasks / Other)  Write out thoughts on transitions rather than avoiding them  Talk to dietician about calcium  Schedule dental work  Start TMS  Continue women's group     Look at taking one thing at the time  Continue to process past        There has been demonstrated improvement in functioning while patient has been engaged in psychotherapy/psychological service- if withdrawn the patient would deteriorate and/or relapse.     MICAELA SLADE, Health system   2024                                                        ______________________________________________________________________    Individual Treatment Plan    Patient's Name: Randi Cleary  YOB: 1955    Date of Creation: 20  Date Treatment Plan Last Reviewed/Revised: 23    DSM5 Diagnoses: 296.89 Bipolar II Disorder Depressed, 300.02 (F41.1) Generalized Anxiety Disorder, or Adjustment Disorders  309.89 (F43.8) Other Specified Trauma and Stressor Related Disorder  Psychosocial / Contextual Factors: Patient's entire family of origin has , she now has a sister-in-law and  as support.  Relationship with  is conflictual. She is recovering from surgeries  PROMIS (reviewed every 90 days): PROMIS-10 Scores  PROMIS 10-Global Health (only subscores and total score):   PROMIS-10 Scores Only 2021 3/15/2022 3/15/2022 3/15/2022 3/24/2022 2022 2022   Global Mental Health Score 6 6 6 6 8 12 12   Global Physical Health Score 9 9 9 9 8 11 10   PROMIS TOTAL - SUBSCORES 15 15 15 15 16 23 22       Referral /  "Collaboration:  Referral to another professional/service is not indicated at this time..    Anticipated number of session for this episode of care: 50  Anticipation frequency of session: Biweekly  Anticipated Duration of each session: 53 or more minutes due to intensity of trauma symptoms  Treatment plan will be reviewed in 90 days or when goals have been changed.   There has been demonstrated improvement in functioning while patient has been engaged in psychotherapy/psychological service- if withdrawn the patient would deteriorate and/or relapse.       MeasurableTreatment Goal(s) related to diagnosis / functional impairment(s)  Goal 1: Patient will \"jumpstart, getting going with the things I need to be doing around the house as far as picking up, doing things, trying to do something every day.  Also to lessen the animosity between me and my .\"    I will know I've met my goal when my shoulders are fixed and I can see.      Objective #A (Patient Action)    Patient will  increase frequency of engaging her in ADLs .  Status: Continued - Date(s): 12/10/21, 3/9/22, 6/9/22, 9/2/22, 12/1/22, 3/2/23, 6/6/23, 9/13/23, 12/14/23    Intervention(s)  Therapist will  engage patient in CBT, specifically behavioral activation .    Objective #B  Patient will   track and record at least 5 pleasant exchanges with . Patient will be able to identify at least 5 positive traits about her  and how he relates to her .  Status: Continued - Date(s): 12/10/21, 3/9/22, 6/9/22, 9/2/22, 12/1/22, 3/2/23, 6/6/23, 9/13/23, 12/14/23    Intervention(s)  Therapist will  teach assertiveness skills and assign homework related to relationship interactions .    Objective #C  Patient will  reduce level of depressive and anxious features as evidenced by reduction in score on her CHAVO-7 and PHQ-9 (scores of 15 and 16 at first measurment, respectively) .  Status: Continued - Date(s): 12/10/21, 3/9/22, 6/9/22, 9/2/22, 12/1/22, 3/2/23, " "6/6/23, 9/13/23, 12/14/23    Intervention(s)  Therapist will  engage patient in person-centered therapy and CBT .    Patient has reviewed and agreed to the above plan.      MICAELA SLADE, [facility]  December 14, 2023                                                   Randi Cleary          SAFETY PLAN:  Step 1: Warning signs / cues (Thoughts, images, mood, situation, behavior) that a crisis may be developing:  Thoughts: \"I don't want to continue\" \"I am unwanted\"  Images: none  Thinking Processes: ruminating  Mood: anger  Behaviors: isolating/withdrawing , can't stop crying, not taking care of myself and not taking care of my responsibilities  Situations: small triggers, such as not being able to find something, or dropping something   Step 2: Coping strategies - Things I can do to take my mind off of my problems without contacting another person (relaxation technique, physical activity):  Distress Tolerance Strategies:  arts and crafts: drawing, play with my pet , listen to positive and upbeat music: any, change body temperature (ice pack/cold water)  and paced breathing/progressive muscle relaxation  Physical Activities: go for a walk, deep breathing and stretching   Focus on helpful thoughts:  \"You've been through this before, you can get through it again.\"  Step 3: People and social settings that provide distraction:                 Name: Carmen                            Name: Darien                           Name: Aleida       pool, shopping, Carmen's house, [employer]       Step 4: Remind myself of people and things that are important to me and worth living for:  Sidney, Aleida Horton, post-COVID world, options of what could be in your future        Step 5: When I am in crisis, I can ask these people to help me use my safety plan:                 Name: Sidney  Step 6: Making the environment safe:   go to sleep/daydream  Step 7: Professionals or agencies I can contact during a crisis:  [facility] Counsel[facility] " Daytime Number: [phone]  Suicide Prevention Lifeline: 4-914-079-TALK (6991)  Crisis Text Line Service (available 24 hours a day, 7 days a week): Text [place] to 767788  Local Crisis Services: [facility] Crisis: [phone]  Adults can always access to the emPATH unit at [facility] (no phone number, utilize it like an urgent care or ER where you just show up)     Call 911 or go to my nearest emergency department.       I helped develop this safety plan and agree to use it when needed.  I have been given a copy of this plan.       Client signature _________________________________________________________________  Today s date:  11/24/2020  Adapted from Safety Plan Template 2008 Randi Poole and Robby Barba is reprinted with the express permission of the authors.  No portion of the Safety Plan Template may be reproduced without the express, written permission.  You can contact the authors at [email or web address] or [email or web address].

## 2024-03-07 ENCOUNTER — OFFICE VISIT (OUTPATIENT)
Dept: PSYCHIATRY | Facility: CLINIC | Age: 69
End: 2024-03-07
Payer: MEDICARE

## 2024-03-07 DIAGNOSIS — F33.2 SEVERE EPISODE OF RECURRENT MAJOR DEPRESSIVE DISORDER, WITHOUT PSYCHOTIC FEATURES (H): Primary | ICD-10-CM

## 2024-03-07 ASSESSMENT — PATIENT HEALTH QUESTIONNAIRE - PHQ9
SUM OF ALL RESPONSES TO PHQ QUESTIONS 1-9: 20
10. IF YOU CHECKED OFF ANY PROBLEMS, HOW DIFFICULT HAVE THESE PROBLEMS MADE IT FOR YOU TO DO YOUR WORK, TAKE CARE OF THINGS AT HOME, OR GET ALONG WITH OTHER PEOPLE: VERY DIFFICULT
SUM OF ALL RESPONSES TO PHQ QUESTIONS 1-9: 20
10. IF YOU CHECKED OFF ANY PROBLEMS, HOW DIFFICULT HAVE THESE PROBLEMS MADE IT FOR YOU TO DO YOUR WORK, TAKE CARE OF THINGS AT HOME, OR GET ALONG WITH OTHER PEOPLE: VERY DIFFICULT

## 2024-03-07 NOTE — PROGRESS NOTES
" Interventional Psychiatry Program  [address], [address]  [place] [address]  TMS Procedure Note   Randi Cleary MRN# [id number]  Age: 68 year old    YOB: 1955    Winnie Cleary comes into clinic today at the request of, Arnaldo Varela MD, ordering provider for TMS treatments.     Pre-Procedure:  History and Physical: Reviewed in medical record  Consent signed by: Randi Cleary for this course of treatment on: 3/6/2024      Clinical Narrative:  Patient presents to initiate an acute course of TMS for management of her symptoms of resistant depression. Patient tolerating first day of treatment.    Indications for TMS:  MDD, recurrent, severe; 4+ medication trials (from 2+ classes) ineffective; Psychotherapy ineffective     Procedure Diagnosis:  MDD, severe, recurrent, F33.2    Treatment Hx:  Treatment number this series: 2  Total lifetime treatment number: 2    Allergies   Allergen Reactions    Serotonin Reuptake Inhibitors (Ssris) Anxiety, Difficulty breathing, Headache, Palpitations and Shortness Of Breath    Buspirone      The patient states she had serotonin syndrome    Cephalexin      Other reaction(s): unknown rxn.    Desvenlafaxine      Serotonin syndrome    Diclofenac Sodium [Diclofenac]      Serotonin syndrome and restless legs syndrome    Gabapentin      Drove on the wrong side of the highway    Levofloxacin      \"CAN'T REMEMBER\"    Penicillins      \"SORES IN MOUTH\"    Riluzole Difficulty breathing and Swelling    Sulfa Antibiotics      \"PT DOES NOT KNOW WHAT THE REACTION WAS\"    Topiramate Other (See Comments)     Frequent urination      LMP  (LMP Unknown)     Pause for the Cause  Right patient: Yes  Right procedure/correct coil:  Yes; rTMS; [id number]; H1 coil.   Earplugs in place:  Yes    Procedure  Patient was seated in procedure chair. Identity and procedure was verified. Ear plugs were placed in ears and patient-specific cap was placed on head and tightened " appropriately. Ruler locations were verified. Bone conducting headphones were not used.  Coil was placed at 0 - eyebrow - 14 and stimulator was set to 55% (90% of MT).  Initial train was well tolerated so 55 trains were delivered.  Patient tolerated procedure well with minimal right eyebrow movement.      Motor Threshold Determination  Distance from nasion to inion: 35.5cm  MT 1: 0 - 7 - 14 @ 61% on 03/06/2024    Standard LDLPFC  Frequency: 18  Train Duration: 2  Total pulses delivered: 1980  Inter-train interval: 20  Tx Loc: 0 - eyebrows - 14    Energy: 55% (90%MT)  Trains: 55     Date MADRS QIDS PHQ-9   03/06/2024 33 18 21                                                 3/5/2024     2:49 PM 3/6/2024    11:27 AM 3/7/2024     1:45 AM   PHQ-9 SCORE   PHQ-9 Total Score MyChart 19 (Moderately severe depression)  20 (Severe depression)   PHQ-9 Total Score 19    19 21 20    20     Plan   - Increase energy as tolerated  - Continue TMS treatments    The service provided today was under the supervising provider of the day, DHRUV Warren MD who was available if needed.   Vee Loja , TMS Technician  [facility]romod[facility]

## 2024-03-08 ENCOUNTER — TELEPHONE (OUTPATIENT)
Dept: PSYCHOLOGY | Facility: CLINIC | Age: 69
End: 2024-03-08
Payer: MEDICARE

## 2024-03-08 ENCOUNTER — OFFICE VISIT (OUTPATIENT)
Dept: PSYCHIATRY | Facility: CLINIC | Age: 69
End: 2024-03-08
Payer: MEDICARE

## 2024-03-08 DIAGNOSIS — F33.2 SEVERE EPISODE OF RECURRENT MAJOR DEPRESSIVE DISORDER, WITHOUT PSYCHOTIC FEATURES (H): Primary | ICD-10-CM

## 2024-03-08 NOTE — PROGRESS NOTES
" Interventional Psychiatry [facility]  [address], [address]  [place] [address]  TMS Procedure Note   Randi Cleary MRN# [id number]  Age: 68 year old    YOB: 1955    Winnie Cleary comes into clinic today at the request of, Arnaldo Varela MD, ordering provider for TMS treatments.     Pre-Procedure:  History and Physical: Reviewed in medical record  Consent signed by: Randi Cleary for this course of treatment on: 3/6/2024      Clinical Narrative:  Patient tolerateing treatment.    Indications for TMS:  MDD, recurrent, severe; 4+ medication trials (from 2+ classes) ineffective; Psychotherapy ineffective     Procedure Diagnosis:  MDD, severe, recurrent, F33.2    Treatment Hx:  Treatment number this series: 3  Total lifetime treatment number: 3    Allergies   Allergen Reactions    Serotonin Reuptake Inhibitors (Ssris) Anxiety, Difficulty breathing, Headache, Palpitations and Shortness Of Breath    Buspirone      The patient states she had serotonin syndrome    Cephalexin      Other reaction(s): unknown rxn.    Desvenlafaxine      Serotonin syndrome    Diclofenac Sodium [Diclofenac]      Serotonin syndrome and restless legs syndrome    Gabapentin      Drove on the wrong side of the highway    Levofloxacin      \"CAN'T REMEMBER\"    Penicillins      \"SORES IN MOUTH\"    Riluzole Difficulty breathing and Swelling    Sulfa Antibiotics      \"PT DOES NOT KNOW WHAT THE REACTION WAS\"    Topiramate Other (See Comments)     Frequent urination      LMP  (LMP Unknown)     Pause for the Cause  Right patient: Yes  Right procedure/correct coil:  Yes; rTMS; [id number]; H1 coil.   Earplugs in place:  Yes    Procedure  Patient was seated in procedure chair. Identity and procedure was verified. Ear plugs were placed in ears and patient-specific cap was placed on head and tightened appropriately. Ruler locations were verified. Bone conducting headphones were not used.  Coil was placed at 0 - eyebrow - 14 and " stimulator was set to parameters below.  Initial train was well tolerated so 55 trains were delivered.  Patient tolerated procedure well with minimal right eyebrow movement.      Motor Threshold Determination  Distance from nasion to inion: 35.5cm  MT 1: 0 - 7 - 14 @ 61% on 03/06/2024    Standard LDLPFC  Frequency: 18  Train Duration: 2  Total pulses delivered: 1980  Inter-train interval: 20  Tx Loc: 0 - eyebrows - 14    Energy: 58% (95%MT)  Trains: 55      *use spacer, legs raised*      Date MADRS QIDS PHQ-9   03/06/2024 33 18 21                                                 3/5/2024     2:49 PM 3/6/2024    11:27 AM 3/7/2024     1:45 AM   PHQ-9 SCORE   PHQ-9 Total Score MyChart 19 (Moderately severe depression)  20 (Severe depression)   PHQ-9 Total Score 19    19 21 20    20     Plan   - Increase energy as tolerated  - Continue TMS treatments    The service provided today was under the supervising provider of the day, Elías Austin MD, who was available if needed.     Eli Reynaga, Psychometrist  [facility]  Mental Health Neuromodulation

## 2024-03-10 ASSESSMENT — PATIENT HEALTH QUESTIONNAIRE - PHQ9
10. IF YOU CHECKED OFF ANY PROBLEMS, HOW DIFFICULT HAVE THESE PROBLEMS MADE IT FOR YOU TO DO YOUR WORK, TAKE CARE OF THINGS AT HOME, OR GET ALONG WITH OTHER PEOPLE: VERY DIFFICULT
SUM OF ALL RESPONSES TO PHQ QUESTIONS 1-9: 21
SUM OF ALL RESPONSES TO PHQ QUESTIONS 1-9: 21

## 2024-03-11 ENCOUNTER — OFFICE VISIT (OUTPATIENT)
Dept: PSYCHIATRY | Facility: CLINIC | Age: 69
End: 2024-03-11
Payer: MEDICARE

## 2024-03-11 DIAGNOSIS — F33.2 SEVERE EPISODE OF RECURRENT MAJOR DEPRESSIVE DISORDER, WITHOUT PSYCHOTIC FEATURES (H): Primary | ICD-10-CM

## 2024-03-11 NOTE — PROGRESS NOTES
" Interventional Psychiatry Program  [address], [address]  [place] [address]  TMS Procedure Note   Randi Cleary MRN# [id number]  Age: 68 year old    YOB: 1955    Winnie Cleary comes into clinic today at the request of, Arnaldo Varela MD, ordering provider for TMS treatments.     Pre-Procedure:  History and Physical: Reviewed in medical record  Consent signed by: Randi Cleary for this course of treatment on: 3/6/2024      Clinical Narrative:  Patient tolerateing treatment. Patient is tolerating treatment at 95%.    Indications for TMS:  MDD, recurrent, severe; 4+ medication trials (from 2+ classes) ineffective; Psychotherapy ineffective     Procedure Diagnosis:  MDD, severe, recurrent, F33.2    Treatment Hx:  Treatment number this series: 4  Total lifetime treatment number: 4    Allergies   Allergen Reactions    Serotonin Reuptake Inhibitors (Ssris) Anxiety, Difficulty breathing, Headache, Palpitations and Shortness Of Breath    Buspirone      The patient states she had serotonin syndrome    Cephalexin      Other reaction(s): unknown rxn.    Desvenlafaxine      Serotonin syndrome    Diclofenac Sodium [Diclofenac]      Serotonin syndrome and restless legs syndrome    Gabapentin      Drove on the wrong side of the highway    Levofloxacin      \"CAN'T REMEMBER\"    Penicillins      \"SORES IN MOUTH\"    Riluzole Difficulty breathing and Swelling    Sulfa Antibiotics      \"PT DOES NOT KNOW WHAT THE REACTION WAS\"    Topiramate Other (See Comments)     Frequent urination      LMP  (LMP Unknown)     Pause for the Cause  Right patient: Yes  Right procedure/correct coil:  Yes; rTMS; [id number]; H1 coil.   Earplugs in place:  Yes    Procedure  Patient was seated in procedure chair. Identity and procedure was verified. Ear plugs were placed in ears and patient-specific cap was placed on head and tightened appropriately. Ruler locations were verified. Bone conducting headphones were not used.  " Coil was placed at 0 - eyebrow - 14 and stimulator was set to parameters below.  Initial train was well tolerated so 55 trains were delivered.  Patient tolerated procedure well with minimal right eyebrow movement.      Motor Threshold Determination  Distance from nasion to inion: 35.5cm  MT 1: 0 - 7 - 14 @ 61% on 03/06/2024    Standard LDLPFC  Frequency: 18  Train Duration: 2  Total pulses delivered: 1980  Inter-train interval: 20  Tx Loc: 0 - eyebrows - 14    Energy: 58% (95%MT)  Trains: 55      *use spacer, legs raised*      Date MADRS QIDS PHQ-9   03/06/2024 33 18 21                                                 3/6/2024    11:27 AM 3/7/2024     1:45 AM 3/10/2024     9:41 PM   PHQ-9 SCORE   PHQ-9 Total Score MyChart  20 (Severe depression) 21 (Severe depression)   PHQ-9 Total Score 21 20    20 21     Plan   - Increase energy as tolerated  - Continue TMS treatments    The service provided today was under the supervising provider of the day, Reva Hutchison MD, who determined motor threshold and was available if needed.     Vee Loja, Psychometrist  [facility]  Mental Health Neuromodulation

## 2024-03-11 NOTE — PROGRESS NOTES
" Interventional Psychiatry Program  [address], [address]  [place] [address]  TMS Procedure Note   Randi Cleary MRN# [id number]  Age: 68 year old    YOB: 1955    Winnie Cleary comes into clinic today at the request of, Arnaldo Varela MD, ordering provider for TMS treatments.     Pre-Procedure:  History and Physical: Reviewed in medical record  Consent signed by: Randi Cleary for this course of treatment on: 3/6/2024      Clinical Narrative:  Patient tolerateing treatment. Patient tolerated treatment at 95%.    Indications for TMS:  MDD, recurrent, severe; 4+ medication trials (from 2+ classes) ineffective; Psychotherapy ineffective     Procedure Diagnosis:  MDD, severe, recurrent, F33.2    Treatment Hx:  Treatment number this series: 4  Total lifetime treatment number: 4    Allergies   Allergen Reactions    Serotonin Reuptake Inhibitors (Ssris) Anxiety, Difficulty breathing, Headache, Palpitations and Shortness Of Breath    Buspirone      The patient states she had serotonin syndrome    Cephalexin      Other reaction(s): unknown rxn.    Desvenlafaxine      Serotonin syndrome    Diclofenac Sodium [Diclofenac]      Serotonin syndrome and restless legs syndrome    Gabapentin      Drove on the wrong side of the highway    Levofloxacin      \"CAN'T REMEMBER\"    Penicillins      \"SORES IN MOUTH\"    Riluzole Difficulty breathing and Swelling    Sulfa Antibiotics      \"PT DOES NOT KNOW WHAT THE REACTION WAS\"    Topiramate Other (See Comments)     Frequent urination      LMP  (LMP Unknown)     Pause for the Cause  Right patient: Yes  Right procedure/correct coil:  Yes; rTMS; [id number]; H1 coil.   Earplugs in place:  Yes    Procedure  Patient was seated in procedure chair. Identity and procedure was verified. Ear plugs were placed in ears and patient-specific cap was placed on head and tightened appropriately. Ruler locations were verified. Bone conducting headphones were not used.  " Coil was placed at 0 - eyebrow - 14 and stimulator was set to parameters below.  Initial train was well tolerated so 55 trains were delivered.  Patient tolerated procedure well with minimal right eyebrow movement.      Motor Threshold Determination  Distance from nasion to inion: 35.5cm  MT 1: 0 - 7 - 14 @ 61% on 03/06/2024    Standard LDLPFC  Frequency: 18  Train Duration: 2  Total pulses delivered: 1980  Inter-train interval: 20  Tx Loc: 0 - eyebrows - 14    Energy: 58% (95%MT)  Trains: 55      *use spacer, legs raised*      Date MADRS QIDS PHQ-9   03/06/2024 33 18 21                                                 3/6/2024    11:27 AM 3/7/2024     1:45 AM 3/10/2024     9:41 PM   PHQ-9 SCORE   PHQ-9 Total Score MyChart  20 (Severe depression) 21 (Severe depression)   PHQ-9 Total Score 21 20    20 21     Plan   - Increase energy as tolerated  - Continue TMS treatments    The service provided today was under the supervising provider of the day, Reva Hutchison MD, who was available if needed.     Vee Loja, TMS Technician  [facility]  Mental [facility] Neuromod[facility]

## 2024-03-12 ENCOUNTER — OFFICE VISIT (OUTPATIENT)
Dept: PSYCHIATRY | Facility: CLINIC | Age: 69
End: 2024-03-12
Payer: MEDICARE

## 2024-03-12 ENCOUNTER — MYC MEDICAL ADVICE (OUTPATIENT)
Dept: PSYCHIATRY | Facility: CLINIC | Age: 69
End: 2024-03-12

## 2024-03-12 ENCOUNTER — OFFICE VISIT (OUTPATIENT)
Dept: PSYCHOLOGY | Facility: CLINIC | Age: 69
End: 2024-03-12
Payer: MEDICARE

## 2024-03-12 DIAGNOSIS — F43.9 TRAUMA AND STRESSOR-RELATED DISORDER: ICD-10-CM

## 2024-03-12 DIAGNOSIS — F41.1 GENERALIZED ANXIETY DISORDER: Primary | ICD-10-CM

## 2024-03-12 DIAGNOSIS — F33.2 SEVERE EPISODE OF RECURRENT MAJOR DEPRESSIVE DISORDER, WITHOUT PSYCHOTIC FEATURES (H): Primary | ICD-10-CM

## 2024-03-12 DIAGNOSIS — F31.81 BIPOLAR 2 DISORDER (H): ICD-10-CM

## 2024-03-12 PROCEDURE — 90837 PSYTX W PT 60 MINUTES: CPT | Performed by: SOCIAL WORKER

## 2024-03-12 ASSESSMENT — PATIENT HEALTH QUESTIONNAIRE - PHQ9
SUM OF ALL RESPONSES TO PHQ QUESTIONS 1-9: 17
SUM OF ALL RESPONSES TO PHQ QUESTIONS 1-9: 17
10. IF YOU CHECKED OFF ANY PROBLEMS, HOW DIFFICULT HAVE THESE PROBLEMS MADE IT FOR YOU TO DO YOUR WORK, TAKE CARE OF THINGS AT HOME, OR GET ALONG WITH OTHER PEOPLE: VERY DIFFICULT
SUM OF ALL RESPONSES TO PHQ QUESTIONS 1-9: 17
10. IF YOU CHECKED OFF ANY PROBLEMS, HOW DIFFICULT HAVE THESE PROBLEMS MADE IT FOR YOU TO DO YOUR WORK, TAKE CARE OF THINGS AT HOME, OR GET ALONG WITH OTHER PEOPLE: VERY DIFFICULT
10. IF YOU CHECKED OFF ANY PROBLEMS, HOW DIFFICULT HAVE THESE PROBLEMS MADE IT FOR YOU TO DO YOUR WORK, TAKE CARE OF THINGS AT HOME, OR GET ALONG WITH OTHER PEOPLE: VERY DIFFICULT
SUM OF ALL RESPONSES TO PHQ QUESTIONS 1-9: 17

## 2024-03-12 NOTE — PROGRESS NOTES
" Interventional Psychiatry Program  [address], [address]  [place] [address]  TMS Procedure Note   Randi Cleary MRN# [id number]  Age: 68 year old    YOB: 1955    Winnie Cleary comes into clinic today at the request of, Arnaldo Varela MD, ordering provider for TMS treatments.     Pre-Procedure:  History and Physical: Reviewed in medical record  Consent signed by: Randi Cleary for this course of treatment on: 3/6/2024      Clinical Narrative:  Patient tolerateing treatment. Patient tolerated treatment at 105%.    Indications for TMS:  MDD, recurrent, severe; 4+ medication trials (from 2+ classes) ineffective; Psychotherapy ineffective     Procedure Diagnosis:  MDD, severe, recurrent, F33.2    Treatment Hx:  Treatment number this series: 5  Total lifetime treatment number: 5    Allergies   Allergen Reactions    Serotonin Reuptake Inhibitors (Ssris) Anxiety, Difficulty breathing, Headache, Palpitations and Shortness Of Breath    Buspirone      The patient states she had serotonin syndrome    Cephalexin      Other reaction(s): unknown rxn.    Desvenlafaxine      Serotonin syndrome    Diclofenac Sodium [Diclofenac]      Serotonin syndrome and restless legs syndrome    Gabapentin      Drove on the wrong side of the highway    Levofloxacin      \"CAN'T REMEMBER\"    Penicillins      \"SORES IN MOUTH\"    Riluzole Difficulty breathing and Swelling    Sulfa Antibiotics      \"PT DOES NOT KNOW WHAT THE REACTION WAS\"    Topiramate Other (See Comments)     Frequent urination      LMP  (LMP Unknown)     Pause for the Cause  Right patient: Yes  Right procedure/correct coil:  Yes; rTMS; [id number]; H1 coil.   Earplugs in place:  Yes    Procedure  Patient was seated in procedure chair. Identity and procedure was verified. Ear plugs were placed in ears and patient-specific cap was placed on head and tightened appropriately. Ruler locations were verified. Bone conducting headphones were not used.  " Coil was placed at 0 - eyebrow - 14 and stimulator was set to parameters below.  Initial train was well tolerated so 55 trains were delivered.  Patient tolerated procedure well with minimal right eyebrow movement.      Motor Threshold Determination  Distance from nasion to inion: 35.5cm  MT 1: 0 - 7 - 14 @ 61% on 03/06/2024    Standard LDLPFC  Frequency: 18  Train Duration: 2  Total pulses delivered: 1980  Inter-train interval: 20  Tx Loc: 0 - eyebrows - 14    Energy: 64% (105%MT)  Trains: 55      *use spacer, legs raised*      Date MADRS QIDS PHQ-9   03/06/2024 33 18 21                                                 3/7/2024     1:45 AM 3/10/2024     9:41 PM 3/12/2024     8:46 AM   PHQ-9 SCORE   PHQ-9 Total Score MyChart 20 (Severe depression) 21 (Severe depression) 17 (Moderately severe depression)   PHQ-9 Total Score 20    20 21 17    17     Plan   - Increase energy as tolerated  - Continue TMS treatments    The service provided today was under the supervising provider of the day, Simba Nguyen MD, who was available if needed.     Vee Loja, TMS Technician  [facility]  Mental [facility] Neuromodulation

## 2024-03-13 ENCOUNTER — OFFICE VISIT (OUTPATIENT)
Dept: PSYCHIATRY | Facility: CLINIC | Age: 69
End: 2024-03-13
Payer: MEDICARE

## 2024-03-13 DIAGNOSIS — F33.2 SEVERE EPISODE OF RECURRENT MAJOR DEPRESSIVE DISORDER, WITHOUT PSYCHOTIC FEATURES (H): Primary | ICD-10-CM

## 2024-03-13 NOTE — PROGRESS NOTES
Referral/Transfer of an [facility] Client to Another [facility] Therapist    CLIENT'S NAME: Winnie Cleary  DATE of Referral/Transfer Request:  March 13, 2024    Referral/Transfer Request Information    Transfer for the same service: Therapist Initiated    Client Phone #:  [phone] (home)        Telephone Information:   Mobile [phone]        Current Therapist: MICAELA SLADE, [facility]      Therapist Receiving Referral/Transfer: Reina Hoang    Referral/Transfer is for: Individual    Rationale for Referral/Transfer: (to be completed by [facility] staff person initiating the referral)  Situation: (What is going on with the client?)  Patient has challenges in mood regulation- getting angry and frustrated, as well as sad and overwhelmed. This negatively impacts her interactions with others, leading to challenges in her relationship with her  and her friends. She has several medical problems which overwhelm her and she often spends time in therapy trying to prioritize these. She has a history of childhood trauma and family challenges which impact her and at times processes these.       Background: (What is the clinical background or context?)  She has undergone testing where she was diagnosed with bipolar 2, however the relationship challenges and history of trauma she experiences lead to suspicions that borderline personality disorder may be more accurate. She also struggles with anxiety and depression.      Assessment: (What do you think the problem is?)   Patient needs help prioritizing tasks, staying focused on management of her symptoms, and following  through on mental health recommendations      Recommendation: (What would you do to correct it?)  Patient is in TMS and should be encouraged to continue to work with interventional psychiatry. She's in a women's group with the psychiatry clinic which is helpful to her, and should be  encouraged. She's newer to working with psychiatry and has hesitation around taking medications, using time to process this and encourage her to voice her concerns is helpful. She is on a waiting list for [facility]'s DBT group, I'd follow up with him on this. She has done PHP in the past and found this really helpful, it's good to point this out when she gets overwhelmed with following through on other mental health recommendations.      Referral/Transfer Status:    Therapist Initiated Referral:  Therapist receiving referral has been informed of and has accepted the referral/Was routed this form in an inbasket message  Referral/Transfer Completion Date: 3/13/23.  Completed by: Micaela Weir  Scheduled for a final session with Micaela Gomez on 3/15. Scheduled with Reina on 4/4, 4/10, 4/17 and on a waiting list for a sooner appointment.      MICAELA SLADE, [facility]  March 13, 2024                 [facility]                                     Progress Note    Patient Name: Randi Cleary  Date: 3/12/24         Service Type: Individual     Session Start Time: 1:05 PM Session End Time: 2:00 PM     Session Length: 55 minutes    Session #: 242    Attendees: Client attended alone    Service Modality:  In-person            DATA  Extended Session (53+ minutes): PROLONGED SERVICE IN THE OUTPATIENT SETTING REQUIRING DIRECT (FACE-TO-FACE) PATIENT CONTACT BEYOND THE USUAL SERVICE:    - Treatment protocol required additional time to complete, due to the nature of diagnosis being treated.  See Interventions section for details  Interactive Complexity: No  Crisis: No        Progress Since Last Session (Related to Symptoms / Goals / Homework):   Symptoms:  Patient reports feeling both hopeful and overwhelmed      Homework:  Progress made       Episode of Care Goals: Satisfactory progress - ACTION (Actively working towards change); Intervened by reinforcing change plan / affirming steps taken     Current /  Ongoing Stressors and Concerns:   Patient is currently socially isolated. She has a conflictual relationship with her .  She is getting minimal physical activity.  She has had several surgeries.      Treatment Objective(s) Addressed in This Session:   Patient will increase frequency of engaging her in ADLs.  Patient will track and record at least 5 pleasant exchanges with . Patient will be able to identify at least 5 positive traits about her .  Patient will reduce level of depressive and anxious features as evidenced by reduction in score on her CHAVO-7 and PHQ-9 (scores of 15 and 16 at first measurment, respectively).     Intervention:   Supportive Therapy:  Processed transition in therapy and made a decision on which provider patient would like to work with. Looked at steps she is doing to care for herself and how to keep this up during transition. Reviewed next steps in her self care.      The following assessments were completed by patient for this visit:  PHQ9:       3/4/2024    10:47 PM 3/5/2024     2:49 PM 3/6/2024    11:27 AM 3/7/2024     1:45 AM 3/10/2024     9:41 PM 3/12/2024     8:46 AM 3/12/2024    12:10 PM   PHQ-9 SCORE   PHQ-9 Total Score MyChart 18 (Moderately severe depression) 19 (Moderately severe depression)  20 (Severe depression) 21 (Severe depression) 17 (Moderately severe depression) 17 (Moderately severe depression)   PHQ-9 Total Score 18 19    19 21 20    20 21 17    17 17     GAD7:       10/9/2023     8:55 AM 10/31/2023     4:25 AM 12/1/2023    11:37 AM 12/18/2023     3:09 PM 1/15/2024    12:02 PM 1/29/2024     3:10 AM 3/5/2024     3:08 PM   CHAVO-7 SCORE   Total Score 15 (severe anxiety) 15 (severe anxiety) 14 (moderate anxiety) 16 (severe anxiety) 14 (moderate anxiety) 16 (severe anxiety) 13 (moderate anxiety)   Total Score 15 15 14 16 14 16 13     PROMIS 10-Global Health (only subscores and total score):       7/21/2023    10:36 AM 7/28/2023     3:24 PM 8/7/2023      1:07 PM 9/20/2023    10:13 AM 10/31/2023     4:36 AM 12/1/2023    11:52 AM 1/22/2024    12:00 PM   PROMIS-10 Scores Only   Global Mental Health Score 5    5  6    6     5    5 6  5   Global Physical Health Score 7    7  8    8     9    9 8  9   PROMIS TOTAL - SUBSCORES 12    12  14    14     14    14 14  14       Information is confidential and restricted. Go to Review Flowsheets to unlock data.    Multiple values from one day are sorted in reverse-chronological order        ASSESSMENT: Current Emotional / Mental Status (status of significant symptoms):   Risk status (Self / Other harm or suicidal ideation)   Patient denies current fears or concerns for personal safety.   Patient denies current or recent suicidal ideation or behaviors.   Patient denies current or recent homicidal ideation or behaviors.   Patient denies current or recent self injurious behavior or ideation.   Patient denies other safety concerns.   Patient reports there has been no change in risk factors since their last session.     Patient reports there has been no change in protective factors since their last session.     A safety and risk management plan has been developed including: Patient consented to co-developed safety plan on 11/24/2020, updated 2/20/23.  Safety and risk management plan was reviewed.   Patient agreed to use safety plan should any safety concerns arise.  A copy was made available to the patient.     Appearance:   Appropriate    Eye Contact:   Good    Psychomotor Behavior: Normal    Attitude:   Cooperative    Orientation:   All   Speech    Rate / Production: Normal/ Responsive    Volume:  Normal    Mood:    Anxious    Affect:    Appropriate    Thought Content:  Clear    Thought Form:  Coherent    Insight:    Good      Medication Review:   No changes to current psychiatric medication(s)      Medication Compliance:   Yes     Changes in Health Issues:   None reported     Chemical Use Review:   Substance Use: Chemical use  reviewed, no active concerns identified Nothing used since 2021.     Tobacco Use: No current tobacco use.      Diagnosis:  1. Generalized anxiety disorder    2. Trauma and stressor-related disorder    3. Bipolar 2 disorder (H)    Borderline personality disorder vs. Bipolar 2      Collateral Reports Completed:   Not Applicable    PLAN: (Patient Tasks / Therapist Tasks / Other)  Write out thoughts on transitions rather than avoiding them  Talk to dietician about calcium  Schedule dental work  Continue women's group     Look at taking one thing at the time  Continue to process past        There has been demonstrated improvement in functioning while patient has been engaged in psychotherapy/psychological service- if withdrawn the patient would deteriorate and/or relapse.     MICAELA SLADE, [facility]   2024                                                        ______________________________________________________________________    Individual Treatment Plan    Patient's Name: Randi Cleary  YOB: 1955    Date of Creation: 20  Date Treatment Plan Last Reviewed/Revised: 23    DSM5 Diagnoses: 296.89 Bipolar II Disorder Depressed, 300.02 (F41.1) Generalized Anxiety Disorder, or Adjustment Disorders  309.89 (F43.8) Other Specified Trauma and Stressor Related Disorder  Psychosocial / Contextual Factors: Patient's entire family of origin has , she now has a sister-in-law and  as support.  Relationship with  is conflictual. She is recovering from surgeries  PROMIS (reviewed every 90 days): PROMIS-10 Scores  PROMIS 10-Global Health (only subscores and total score):   PROMIS-10 Scores Only 2021 3/15/2022 3/15/2022 3/15/2022 3/24/2022 2022 2022   Global Mental Health Score 6 6 6 6 8 12 12   Global Physical Health Score 9 9 9 9 8 11 10   PROMIS TOTAL - SUBSCORES 15 15 15 15 16 23 22       Referral / Collaboration:  Referral to another  "professional/service is not indicated at this time..    Anticipated number of session for this episode of care: 50  Anticipation frequency of session: Biweekly  Anticipated Duration of each session: 53 or more minutes due to intensity of trauma symptoms  Treatment plan will be reviewed in 90 days or when goals have been changed.   There has been demonstrated improvement in functioning while patient has been engaged in psychotherapy/psychological service- if withdrawn the patient would deteriorate and/or relapse.       MeasurableTreatment Goal(s) related to diagnosis / functional impairment(s)  Goal 1: Patient will \"jumpstart, getting going with the things I need to be doing around the house as far as picking up, doing things, trying to do something every day.  Also to lessen the animosity between me and my .\"    I will know I've met my goal when my shoulders are fixed and I can see.      Objective #A (Patient Action)    Patient will  increase frequency of engaging her in ADLs .  Status: Continued - Date(s): 12/10/21, 3/9/22, 6/9/22, 9/2/22, 12/1/22, 3/2/23, 6/6/23, 9/13/23, 12/14/23    Intervention(s)  Therapist will  engage patient in CBT, specifically behavioral activation .    Objective #B  Patient will   track and record at least 5 pleasant exchanges with . Patient will be able to identify at least 5 positive traits about her  and how he relates to her .  Status: Continued - Date(s): 12/10/21, 3/9/22, 6/9/22, 9/2/22, 12/1/22, 3/2/23, 6/6/23, 9/13/23, 12/14/23    Intervention(s)  Therapist will  teach assertiveness skills and assign homework related to relationship interactions .    Objective #C  Patient will  reduce level of depressive and anxious features as evidenced by reduction in score on her CHAVO-7 and PHQ-9 (scores of 15 and 16 at first measurment, respectively) .  Status: Continued - Date(s): 12/10/21, 3/9/22, 6/9/22, 9/2/22, 12/1/22, 3/2/23, 6/6/23, 9/13/23, " "12/14/23    Intervention(s)  Therapist will  engage patient in person-centered therapy and CBT .    Patient has reviewed and agreed to the above plan.      MICAELA SLADE, [facility]  December 14, 2023                                                   Randi Cleary          SAFETY PLAN:  Step 1: Warning signs / cues (Thoughts, images, mood, situation, behavior) that a crisis may be developing:  Thoughts: \"I don't want to continue\" \"I am unwanted\"  Images: none  Thinking Processes: ruminating  Mood: anger  Behaviors: isolating/withdrawing , can't stop crying, not taking care of myself and not taking care of my responsibilities  Situations: small triggers, such as not being able to find something, or dropping something   Step 2: Coping strategies - Things I can do to take my mind off of my problems without contacting another person (relaxation technique, physical activity):  Distress Tolerance Strategies:  arts and crafts: drawing, play with my pet , listen to positive and upbeat music: any, change body temperature (ice pack/cold water)  and paced breathing/progressive muscle relaxation  Physical Activities: go for a walk, deep breathing and stretching   Focus on helpful thoughts:  \"You've been through this before, you can get through it again.\"  Step 3: People and social settings that provide distraction:                 Name: Carmen                            Name: Darien                           Name: Aleida       pool, shopping, Carmen's house, [employer]       Step 4: Remind myself of people and things that are important to me and worth living for:  Sidney, Clifford, Aleida, post-COVID world, options of what could be in your future        Step 5: When I am in crisis, I can ask these people to help me use my safety plan:                 Name: Sidney  Step 6: Making the environment safe:   go to sleep/daydream  Step 7: Professionals or agencies I can contact during a crisis:  [facility] Daytime Number: " [phone]  Suicide Prevention Lifeline: 9-846-090-TALK (6834)  Crisis Text Line Service (available 24 hours a day, 7 days a week): Text [place] to 594331  Local Crisis Services: [facility] Crisis: [phone]  Adults can always access to the emPATH unit at [facility] (no phone number, utilize it like an urgent care or ER where you just show up)     Call 911 or go to my nearest emergency department.       I helped develop this safety plan and agree to use it when needed.  I have been given a copy of this plan.       Client signature _________________________________________________________________  Today s date:  11/24/2020  Adapted from Safety Plan Template 2008 Randi Poole and Robby Barba is reprinted with the express permission of the authors.  No portion of the Safety Plan Template may be reproduced without the express, written permission.  You can contact the authors at [email or web address] or [email or web address].

## 2024-03-13 NOTE — PROGRESS NOTES
" Interventional Psychiatry Program  [address], [address]  [place] [address]  TMS Procedure Note   Randi Cleary MRN# [id number]  Age: 68 year old    YOB: 1955    Winnie Cleary comes into clinic today at the request of, Arnaldo Varela MD, ordering provider for TMS treatments.     Pre-Procedure:  History and Physical: Reviewed in medical record  Consent signed by: Randi Cleary for this course of treatment on: 3/6/2024      Clinical Narrative:  Patient tolerateing treatment. Patient tolerated treatment at 115%.    Indications for TMS:  MDD, recurrent, severe; 4+ medication trials (from 2+ classes) ineffective; Psychotherapy ineffective     Procedure Diagnosis:  MDD, severe, recurrent, F33.2    Treatment Hx:  Treatment number this series: 6  Total lifetime treatment number: 6    Allergies   Allergen Reactions    Serotonin Reuptake Inhibitors (Ssris) Anxiety, Difficulty breathing, Headache, Palpitations and Shortness Of Breath    Buspirone      The patient states she had serotonin syndrome    Cephalexin      Other reaction(s): unknown rxn.    Desvenlafaxine      Serotonin syndrome    Diclofenac Sodium [Diclofenac]      Serotonin syndrome and restless legs syndrome    Gabapentin      Drove on the wrong side of the highway    Levofloxacin      \"CAN'T REMEMBER\"    Penicillins      \"SORES IN MOUTH\"    Riluzole Difficulty breathing and Swelling    Sulfa Antibiotics      \"PT DOES NOT KNOW WHAT THE REACTION WAS\"    Topiramate Other (See Comments)     Frequent urination      LMP  (LMP Unknown)     Pause for the Cause  Right patient: Yes  Right procedure/correct coil:  Yes; rTMS; [id number]; H1 coil.   Earplugs in place:  Yes    Procedure  Patient was seated in procedure chair. Identity and procedure was verified. Ear plugs were placed in ears and patient-specific cap was placed on head and tightened appropriately. Ruler locations were verified. Bone conducting headphones were not used.  " Coil was placed at 0 - eyebrow - 14 and stimulator was set to parameters below.  Initial train was well tolerated so 55 trains were delivered.  Patient tolerated procedure well with minimal right eyebrow movement.      Motor Threshold Determination  Distance from nasion to inion: 35.5cm  MT 1: 0 - 7 - 14 @ 61% on 03/06/2024    Standard LDLPFC  Frequency: 18  Train Duration: 2  Total pulses delivered: 1980  Inter-train interval: 20  Tx Loc: 0 - eyebrows - 14    Energy: 70% (115%MT)  Trains: 55      *use spacer, legs raised*      Date MADRS QIDS PHQ-9   03/06/2024 33 18 21                                                 3/10/2024     9:41 PM 3/12/2024     8:46 AM 3/12/2024    12:10 PM   PHQ-9 SCORE   PHQ-9 Total Score MyChart 21 (Severe depression) 17 (Moderately severe depression) 17 (Moderately severe depression)   PHQ-9 Total Score 21 17    17 17     Plan   - Increase energy as tolerated  - Continue TMS treatments    The service provided today was under the supervising provider of the day, Reva Hutchison MD, who was available if needed.     Vee Loja, TMS Technician  [facility]  Mental [facility] Neuromodulation

## 2024-03-14 ENCOUNTER — OFFICE VISIT (OUTPATIENT)
Dept: PSYCHOLOGY | Facility: CLINIC | Age: 69
End: 2024-03-14
Payer: MEDICARE

## 2024-03-14 ENCOUNTER — TELEPHONE (OUTPATIENT)
Dept: PSYCHIATRY | Facility: CLINIC | Age: 69
End: 2024-03-14

## 2024-03-14 ENCOUNTER — OFFICE VISIT (OUTPATIENT)
Dept: PSYCHIATRY | Facility: CLINIC | Age: 69
End: 2024-03-14
Payer: MEDICARE

## 2024-03-14 DIAGNOSIS — F41.1 GENERALIZED ANXIETY DISORDER: Primary | ICD-10-CM

## 2024-03-14 DIAGNOSIS — F33.2 SEVERE EPISODE OF RECURRENT MAJOR DEPRESSIVE DISORDER, WITHOUT PSYCHOTIC FEATURES (H): Primary | ICD-10-CM

## 2024-03-14 DIAGNOSIS — F31.81 BIPOLAR 2 DISORDER (H): ICD-10-CM

## 2024-03-14 DIAGNOSIS — F43.9 TRAUMA AND STRESSOR-RELATED DISORDER: ICD-10-CM

## 2024-03-14 ASSESSMENT — PATIENT HEALTH QUESTIONNAIRE - PHQ9
10. IF YOU CHECKED OFF ANY PROBLEMS, HOW DIFFICULT HAVE THESE PROBLEMS MADE IT FOR YOU TO DO YOUR WORK, TAKE CARE OF THINGS AT HOME, OR GET ALONG WITH OTHER PEOPLE: VERY DIFFICULT
10. IF YOU CHECKED OFF ANY PROBLEMS, HOW DIFFICULT HAVE THESE PROBLEMS MADE IT FOR YOU TO DO YOUR WORK, TAKE CARE OF THINGS AT HOME, OR GET ALONG WITH OTHER PEOPLE: VERY DIFFICULT
SUM OF ALL RESPONSES TO PHQ QUESTIONS 1-9: 16

## 2024-03-14 NOTE — TELEPHONE ENCOUNTER
Patient notified TMS tech during treatment she is being recommended to start Gabapentin by another provider. Any concerns in relation to her TMS treatments?

## 2024-03-14 NOTE — PROGRESS NOTES
" Interventional Psychiatry Program  [address], [address]  [place] [address]  TMS Procedure Note   Randi Cleary MRN# [id number]  Age: 68 year old    YOB: 1955    Winnie Cleary comes into clinic today at the request of, Arnaldo Varela MD, ordering provider for TMS treatments.     Pre-Procedure:  History and Physical: Reviewed in medical record  Consent signed by: Randi Cleary for this course of treatment on: 3/6/2024      Clinical Narrative:  Patient tolerateing treatment. Patient tolerated treatment at 120%.    Indications for TMS:  MDD, recurrent, severe; 4+ medication trials (from 2+ classes) ineffective; Psychotherapy ineffective     Procedure Diagnosis:  MDD, severe, recurrent, F33.2    Treatment Hx:  Treatment number this series: 7  Total lifetime treatment number: 7    Allergies   Allergen Reactions    Serotonin Reuptake Inhibitors (Ssris) Anxiety, Difficulty breathing, Headache, Palpitations and Shortness Of Breath    Buspirone      The patient states she had serotonin syndrome    Cephalexin      Other reaction(s): unknown rxn.    Desvenlafaxine      Serotonin syndrome    Diclofenac Sodium [Diclofenac]      Serotonin syndrome and restless legs syndrome    Gabapentin      Drove on the wrong side of the highway    Levofloxacin      \"CAN'T REMEMBER\"    Penicillins      \"SORES IN MOUTH\"    Riluzole Difficulty breathing and Swelling    Sulfa Antibiotics      \"PT DOES NOT KNOW WHAT THE REACTION WAS\"    Topiramate Other (See Comments)     Frequent urination      LMP  (LMP Unknown)     Pause for the Cause  Right patient: Yes  Right procedure/correct coil:  Yes; rTMS; [id number]; H1 coil.   Earplugs in place:  Yes    Procedure  Patient was seated in procedure chair. Identity and procedure was verified. Ear plugs were placed in ears and patient-specific cap was placed on head and tightened appropriately. Ruler locations were verified. Bone conducting headphones were not used.  " Coil was placed at 0 - eyebrow - 14 and stimulator was set to parameters below.  Initial train was well tolerated so 55 trains were delivered.  Patient tolerated procedure well with minimal right eyebrow movement.      Motor Threshold Determination  Distance from nasion to inion: 35.5cm  MT 1: 0 - 7 - 14 @ 61% on 03/06/2024    Standard LDLPFC  Frequency: 18  Train Duration: 2  Total pulses delivered: 1980  Inter-train interval: 20  Tx Loc: 0 - eyebrows - 14    Energy: 73% (120%MT)  Trains: 55      *use spacer, legs raised*      Date MADRS QIDS PHQ-9   03/06/2024 33 18 21                                                 3/12/2024     8:46 AM 3/12/2024    12:10 PM 3/14/2024     8:27 AM   PHQ-9 SCORE   PHQ-9 Total Score MyChart 17 (Moderately severe depression) 17 (Moderately severe depression) 16 (Moderately severe depression)   PHQ-9 Total Score 17    17 17 16    16     Plan   - Increase energy as tolerated  - Continue TMS treatments    The service provided today was under the supervising provider of the day, DHRUV Warren MD, who was available if needed.     Vee Loja, TMS Technician  [facility]

## 2024-03-14 NOTE — PROGRESS NOTES
PHYSICAL THERAPY Treatment:    Date: 10/22/2019  Recorded diagnosis/complaint at time of admission:  There are no active hospital problems to display for this patient.    Co-morbidities:   Patient Active Problem List   Diagnosis   • Type 2 diabetes mellitus with diabetic polyneuropathy, with long-term current use of insulin (CMS/[facility])   • Anxiety disorder   • Diabetic neuropathy (CMS/[facility])   • Obesity, Class III, BMI 40-49.9 (morbid obesity) (CMS/Formerly [facility])   • Hyperlipidemia   • Legal blindness   • Pars planitis of both eyes   • Nuclear senile cataract of left eye   • After-cataract, unspecified   • Microalbuminuria   • GERD (gastroesophageal reflux disease)   • Family history of premature CAD   • Essential tremor   • Essential hypertension   • Major depressive disorder, recurrent episode, moderate (CMS/[facility])   • Cervical spine pain   • Pain in thoracic spine   • Sternum pain   • Lichen sclerosus et atrophicus   • Dysplastic nevus   • Mixed hyperlipidemia   • Family history of breast cancer in mother   • Arthritis of left knee   • Cervical strain   • Cervical spondylosis without myelopathy     S/p left TKR    Clinical Presentation: stable and/or uncomplicated characteristics  Treatment Diagnosis: Pain, Impaired Range of Motion, Impaired Gait/Locomotion Deficits, Impaired Mobility and Impaired Balance    Therapy Precautions:  Weight Bearing Status WBAT LLE  TKR Immobilizer not required at this time due to patient having adequate quad strength after the one time adductor nerve block injection    Activity: As tolerated and up with assist    Cognition: Alert and [place] x3      SUBJECTIVE:   Pt. Reported agreeable to therapy  Pt's personal goal for therapy: return home    Pain:  At Rest: 9/10  With Activity: 9/10  Intervention: premedicated, nursing aware, repositioned, ice applied and activity  Location: left, knee     OBSERVATION:   Pt is utilizing IV, O2, Pulse Ox, Blood Pressure Cuff and SCDs  Skin Integrity: Impaired,  Briefly met with patient who forgot an item from Tuesday. Final session scheduled tomorrow 3/15/24.   left knee incision under bandage, nurse managing  Edema: post-surgical left, lower extremity  Collaboration with: Nurse Laureen Angel    Vital Signs: VSS    ROM (degrees):  UE: within functional limits  LE: limited left knee flexion in sitting = 59 degrees  Lumbar: within functional limits  Cervical: within functional limits    Strength (out of 5 in available AROM):  UE: within functional limits  LE: limited left able to weightbear with 2ww and keena UE support  Lumbar: within functional limits  Cervical: within functional limits  Abdominal: limited requires assist to come to sitting for trunk    Neurological:  Coordination: intact  Vision: impaired is legally blind at baseline    Balance:   Static Sitting: intact  Static Standing: impaired requires CGA and 2ww    Outcome Measure:   not completed this date secondary to focus on other areas of concern/limitation       MOBILITY:    Bed:   Deferred secondary to patient beginning and ending in sitting position    Transfers:   Device Used: gait belt, 2 wheeled walker  Sit to Stand: Contact Guard Assistance (CGA)  Stand to Sit: Contact Guard Assistance (CGA)  Reason for Assistance: requires increased time to complete, weakness, verbal cues for hand placement, sequencing, manual cues for pain, fatigue    Ambulation:   Assistance Level: Contact Guard Assistance (CGA)  Distance: 25 feet  Device Used: gait belt and 2 wheeled walker  Gait Mechanics: step to pattern, decreased step length bilateral, decreased stance time left, decreased rocky  Reason for Assistance: requires increased time to complete, weakness, verbal cues for sequencing, weight bearing, manual cues for pain, fatigue, patient took numerous standing rest breaks due to pain      Stairs:   Not addressed this session    Exercises:  Sitting: Knee Flexion, writer instructed patient on importance of flexing knee while in chair, patient verbalizes understanding, returns demonstration. Writer instructed patient to  keep knees flexed while up in chair, patient reports understanding.    Activity Tolerance: limited by pain, fatigue     GOALS:     Rehab potential is good due to positive factors recent onset, post - surgical  and negative factors co-morbidities    Review Date: 10/23/2019  1. Patient will complete HEP with Wetzel.  Goal not met  2. Patient will complete bed mobility with Modified Wetzel (mod I).  Progressing toward  3. Patient will complete sit to/from stand with Modified [place]zel (mod I) and 2 wheeled walker.   Progressing toward  4. Patient will ambulate 150 feet with Modified Wetzel (mod I) and 2 wheeled walker.   Progressing toward  5. Patient will demonstrate and/or verbalize understanding of car transfer completion.   Goal not met     PLAN OF CARE:    PT Frequency: Twice a day  Duration: 2-3 days  Treatment/Interventions: Functional transfer training, Strengthening, ROM, Endurance training, Patient/Family training, Equipment eval/education, Bed mobility, Gait training, Safety Education     RECOMMENDATIONS/EDUCATION:    Learner: patient and patient's friend  Readiness to Learn: acceptance  Learning Method: Verbal  Barriers to Learning: no barriers apparent at this time  Learning Evaluation: Verbalizes understanding and Needs reinforcement  Teaching Content: Weight Bearing Status, Positioning, Equipment, Safety Awareness, Functional Mobility and Role of physical therapy in the hospital setting  Please reference the patient education activity for further information regarding the patient's learning assessment.  Equipment for discharge: 2 wheeled walker       Order Status: order placed in chart, awaiting MD signature       Delivery Status: issue at d/c     See doc flowsheet (PT assess/treat/goals/charges) for specific information regarding time spent with patient.        Treatment Plan for Next Session: bed mobility, transfers, ambulation, therapeutic exercise, car transfer    The plan of  care and goals were established with the patient and she is in agreement.     ASSESSMENT:       ambulation tolerance is 25 feet  Patient presents to physical therapy on POD 1 am status post left TKR.  Patient is demonstrating decreased range of motion, decreased strength, balance deficits, pain, decreased activity tolerance, swelling, decreased endurance which is limiting the completion of all functional mobility.  Further skilled physical therapy is required to address these limitations in attempt to maximize the patient's independence.    Recommendation for Discharge: PT WI: Home, Home therapy                    After today's session this therapist is recommending the above location for optimal discharge.  This recommendation is supported by patient is moving well, will have assist once home, and will benefit from continued skilled PT to maximize mobility and community independence.

## 2024-03-15 ENCOUNTER — OFFICE VISIT (OUTPATIENT)
Dept: PSYCHIATRY | Facility: CLINIC | Age: 69
End: 2024-03-15
Payer: MEDICARE

## 2024-03-15 ENCOUNTER — VIRTUAL VISIT (OUTPATIENT)
Dept: PSYCHOLOGY | Facility: CLINIC | Age: 69
End: 2024-03-15
Payer: MEDICARE

## 2024-03-15 DIAGNOSIS — F41.1 GENERALIZED ANXIETY DISORDER: Primary | ICD-10-CM

## 2024-03-15 DIAGNOSIS — F43.9 TRAUMA AND STRESSOR-RELATED DISORDER: ICD-10-CM

## 2024-03-15 DIAGNOSIS — F33.2 SEVERE EPISODE OF RECURRENT MAJOR DEPRESSIVE DISORDER, WITHOUT PSYCHOTIC FEATURES (H): Primary | ICD-10-CM

## 2024-03-15 DIAGNOSIS — F31.81 BIPOLAR 2 DISORDER (H): ICD-10-CM

## 2024-03-15 DIAGNOSIS — M19.071 OSTEOARTHRITIS OF RIGHT ANKLE AND FOOT: ICD-10-CM

## 2024-03-15 PROCEDURE — 90837 PSYTX W PT 60 MINUTES: CPT | Mod: 95 | Performed by: SOCIAL WORKER

## 2024-03-15 NOTE — PROGRESS NOTES
[facility]                                     Progress Note    Patient Name: Randi Cleary  Date: 3/15/24         Service Type: Individual     Session Start Time: 1:35 PM Session End Time: 2:30 PM     Session Length: 55 minutes    Session #: 243    Attendees: Client attended alone    Service Modality:  Video Visit:      Provider verified identity through the following two step process.  Patient provided:  Patient is known previously to provider    Telemedicine Visit: The patient's condition can be safely assessed and treated via synchronous audio and visual telemedicine encounter.      Reason for Telemedicine Visit: Patient convenience (e.g. access to timely appointments / distance to available provider)    Originating Site (Patient Location): Patient's home    Distant Site (Provider Location): Provider Remote Setting- Home Office    Consent:  The patient/guardian has verbally consented to: the potential risks and benefits of telemedicine (video visit) versus in person care; bill my insurance or make self-payment for services provided; and responsibility for payment of non-covered services.     Patient would like the video invitation sent by:  My Chart    Mode of Communication:  [facility] Conference via [facility]    Distant Location (Provider):  Off-site    As the provider I attest to compliance with applicable laws and regulations related to telemedicine.            DATA  Extended Session (53+ minutes): PROLONGED SERVICE IN THE OUTPATIENT SETTING REQUIRING DIRECT (FACE-TO-FACE) PATIENT CONTACT BEYOND THE USUAL SERVICE:    - Treatment protocol required additional time to complete, due to the nature of diagnosis being treated.  See Interventions section for details  Interactive Complexity: No  Crisis: No        Progress Since Last Session (Related to Symptoms / Goals / Homework):   Symptoms:  Patient reports doing well and prepared for the transition.      Homework:  Progress made    Write out  thoughts on transitions rather than avoiding them  Talk to dietician about calcium  Schedule dental work  Continue women's group     Look at taking one thing at the time  Continue to process past     Episode of Care Goals: Satisfactory progress - ACTION (Actively working towards change); Intervened by reinforcing change plan / affirming steps taken     Current / Ongoing Stressors and Concerns:   Patient is currently socially isolated. She has a conflictual relationship with her .  She is getting minimal physical activity.  She has had several surgeries.      Treatment Objective(s) Addressed in This Session:   Patient will increase frequency of engaging her in ADLs.  Patient will track and record at least 5 pleasant exchanges with . Patient will be able to identify at least 5 positive traits about her .  Patient will reduce level of depressive and anxious features as evidenced by reduction in score on her CHAVO-7 and PHQ-9 (scores of 15 and 16 at first measurment, respectively).     Intervention:   Supportive Therapy:  Processed transition with provider. Talked about how she's feeling better for moments at a time with TMS. Encouraged patient to continue to prioritize her mental health. Looked at progress made thus far.       The following assessments were completed by patient for this visit:  PHQ9:       3/6/2024    11:27 AM 3/7/2024     1:45 AM 3/10/2024     9:41 PM 3/12/2024     8:46 AM 3/12/2024    12:10 PM 3/14/2024     8:27 AM 3/15/2024    10:46 AM   PHQ-9 SCORE   PHQ-9 Total Score MyChart  20 (Severe depression) 21 (Severe depression) 17 (Moderately severe depression) 17 (Moderately severe depression) 16 (Moderately severe depression)    PHQ-9 Total Score 21 20    20 21 17    17 17 16    16 15     GAD7:       10/9/2023     8:55 AM 10/31/2023     4:25 AM 12/1/2023    11:37 AM 12/18/2023     3:09 PM 1/15/2024    12:02 PM 1/29/2024     3:10 AM 3/5/2024     3:08 PM   CHAVO-7 SCORE   Total Score 15  (severe anxiety) 15 (severe anxiety) 14 (moderate anxiety) 16 (severe anxiety) 14 (moderate anxiety) 16 (severe anxiety) 13 (moderate anxiety)   Total Score 15 15 14 16 14 16 13     PROMIS 10-Global Health (only subscores and total score):       7/21/2023    10:36 AM 7/28/2023     3:24 PM 8/7/2023     1:07 PM 9/20/2023    10:13 AM 10/31/2023     4:36 AM 12/1/2023    11:52 AM 1/22/2024    12:00 PM   PROMIS-10 Scores Only   Global Mental Health Score 5    5  6    6     5    5 6  5   Global Physical Health Score 7    7  8    8     9    9 8  9   PROMIS TOTAL - SUBSCORES 12    12  14    14     14    14 14  14       Information is confidential and restricted. Go to Review Flowsheets to unlock data.    Multiple values from one day are sorted in reverse-chronological order        ASSESSMENT: Current Emotional / Mental Status (status of significant symptoms):   Risk status (Self / Other harm or suicidal ideation)   Patient denies current fears or concerns for personal safety.   Patient denies current or recent suicidal ideation or behaviors.   Patient denies current or recent homicidal ideation or behaviors.   Patient denies current or recent self injurious behavior or ideation.   Patient denies other safety concerns.   Patient reports there has been no change in risk factors since their last session.     Patient reports there has been no change in protective factors since their last session.     A safety and risk management plan has been developed including: Patient consented to co-developed safety plan on 11/24/2020, updated 2/20/23.  Safety and risk management plan was reviewed.   Patient agreed to use safety plan should any safety concerns arise.  A copy was made available to the patient.     Appearance:   Appropriate    Eye Contact:   Good    Psychomotor Behavior: Normal    Attitude:   Cooperative    Orientation:   All   Speech    Rate / Production: Normal/ Responsive    Volume:  Normal    Mood:    Anxious     Affect:    Appropriate    Thought Content:  Clear    Thought Form:  Coherent    Insight:    Good      Medication Review:   No changes to current psychiatric medication(s)      Medication Compliance:   Yes     Changes in Health Issues:   None reported     Chemical Use Review:   Substance Use: Chemical use reviewed, no active concerns identified Nothing used since 2021.     Tobacco Use: No current tobacco use.      Diagnosis:  1. Generalized anxiety disorder    2. Trauma and stressor-related disorder    3. Bipolar 2 disorder (H)    Borderline personality disorder vs. Bipolar 2      Collateral Reports Completed:   Not Applicable    PLAN: (Patient Tasks / Therapist Tasks / Other)  Write out thoughts on transitions rather than avoiding them  Talk to dietician about calcium  Schedule dental work  Continue women's group     Look at taking one thing at the time  Continue to process past        There has been demonstrated improvement in functioning while patient has been engaged in psychotherapy/psychological service- if withdrawn the patient would deteriorate and/or relapse.     MICAELA SLADE, [facility]   March 15, 2024                                                        ______________________________________________________________________    Individual Treatment Plan    Patient's Name: Randi Cleary  YOB: 1955    Date of Creation: 20  Date Treatment Plan Last Reviewed/Revised: 3/15/24    DSM5 Diagnoses: 296.89 Bipolar II Disorder Depressed, 300.02 (F41.1) Generalized Anxiety Disorder, or Adjustment Disorders  309.89 (F43.8) Other Specified Trauma and Stressor Related Disorder  Psychosocial / Contextual Factors: Patient's entire family of origin has , she now has a sister-in-law and  as support.  Relationship with  is conflictual. She is recovering from surgeries  PROMIS (reviewed every 90 days): PROMIS-10 Scores  PROMIS 10-Global Health (only subscores and total  "score):   PROMIS-10 Scores Only 12/14/2021 3/15/2022 3/15/2022 3/15/2022 3/24/2022 4/1/2022 6/9/2022   Global Mental Health Score 6 6 6 6 8 12 12   Global Physical Health Score 9 9 9 9 8 11 10   PROMIS TOTAL - SUBSCORES 15 15 15 15 16 23 22       Referral / Collaboration:  Referral to another professional/service is not indicated at this time..    Anticipated number of session for this episode of care: 50  Anticipation frequency of session: Biweekly  Anticipated Duration of each session: 53 or more minutes due to intensity of trauma symptoms  Treatment plan will be reviewed in 90 days or when goals have been changed.   There has been demonstrated improvement in functioning while patient has been engaged in psychotherapy/psychological service- if withdrawn the patient would deteriorate and/or relapse.       MeasurableTreatment Goal(s) related to diagnosis / functional impairment(s)  Goal 1: Patient will \"jumpstart, getting going with the things I need to be doing around the house as far as picking up, doing things, trying to do something every day.  Also to lessen the animosity between me and my .\"    I will know I've met my goal when my shoulders are fixed and I can see.      Objective #A (Patient Action)    Patient will  increase frequency of engaging her in ADLs .  Status: Continued - Date(s): 12/10/21, 3/9/22, 6/9/22, 9/2/22, 12/1/22, 3/2/23, 6/6/23, 9/13/23, 12/14/23, 3/15/24    Intervention(s)  Therapist will  engage patient in CBT, specifically behavioral activation .    Objective #B  Patient will   track and record at least 5 pleasant exchanges with . Patient will be able to identify at least 5 positive traits about her  and how he relates to her .  Status: Continued - Date(s): 12/10/21, 3/9/22, 6/9/22, 9/2/22, 12/1/22, 3/2/23, 6/6/23, 9/13/23, 12/14/23, 3/15/24    Intervention(s)  Therapist will  teach assertiveness skills and assign homework related to relationship interactions " ".    Objective #C  Patient will  reduce level of depressive and anxious features as evidenced by reduction in score on her CHAVO-7 and PHQ-9 (scores of 15 and 16 at first measurment, respectively) .  Status: Continued - Date(s): 12/10/21, 3/9/22, 6/9/22, 9/2/22, 12/1/22, 3/2/23, 6/6/23, 9/13/23, 12/14/23, 3/15/24    Intervention(s)  Therapist will  engage patient in person-centered therapy and CBT .    Patient has reviewed and agreed to the above plan.      MICAELA SLADE, [facility]  March 15, 2024                                                   Randi Cleary          SAFETY PLAN:  Step 1: Warning signs / cues (Thoughts, images, mood, situation, behavior) that a crisis may be developing:  Thoughts: \"I don't want to continue\" \"I am unwanted\"  Images: none  Thinking Processes: ruminating  Mood: anger  Behaviors: isolating/withdrawing , can't stop crying, not taking care of myself and not taking care of my responsibilities  Situations: small triggers, such as not being able to find something, or dropping something   Step 2: Coping strategies - Things I can do to take my mind off of my problems without contacting another person (relaxation technique, physical activity):  Distress Tolerance Strategies:  arts and crafts: drawing, play with my pet , listen to positive and upbeat music: any, change body temperature (ice pack/cold water)  and paced breathing/progressive muscle relaxation  Physical Activities: go for a walk, deep breathing and stretching   Focus on helpful thoughts:  \"You've been through this before, you can get through it again.\"  Step 3: People and social settings that provide distraction:                 Name: Carmen                            Name: Darien                           Name: Aleida       pool, shopping, Carmen's house, [employer]       Step 4: Remind myself of people and things that are important to me and worth living for:  Clifford Little Donna, post-COVID world, options of what could be in your " future        Step 5: When I am in crisis, I can ask these people to help me use my safety plan:                 Name: Sidney  Step 6: Making the environment safe:   go to sleep/daydream  Step 7: Professionals or agencies I can contact during a crisis:  [facility] Number: [phone]  Suicide Prevention Lifeline: 5-769-511-TALK (8255)  Crisis Text Line Service (available 24 hours a day, 7 days a week): Text [place] to 671388  Local Crisis Services: [facility] Crisis: [phone]  Adults can always access to the emPATH unit at [facility] (no phone number, utilize it like an urgent care or ER where you just show up)     Call 911 or go to my nearest emergency department.       I helped develop this safety plan and agree to use it when needed.  I have been given a copy of this plan.       Client signature _________________________________________________________________  Today s date:  11/24/2020  Adapted from Safety Plan Template 2008 Randi Poole and Robby Barba is reprinted with the express permission of the authors.  No portion of the Safety Plan Template may be reproduced without the express, written permission.  You can contact the authors at [email or web address] or [email or web address].

## 2024-03-15 NOTE — TELEPHONE ENCOUNTER
What is the concern that needs to be addressed by a nurse?     Pt had a fall and she spilled her pain medication. She was only able to recover 7 pills. She was very upset and crying that she will be in pain of no one helps her. She would like  call back to discuss her options. PCP will not fill medication     May a detailed message be left on voicemail? Yes     Date of last office visit: 3-14-24    Message routed to: Anita Caba

## 2024-03-15 NOTE — PROGRESS NOTES
" Interventional Psychiatry Program  [address], [address]  [place] [address]  TMS Procedure Note   Randi Cleary MRN# [id number]  Age: 68 year old    YOB: 1955    Winnie Cleary comes into clinic today at the request of, Arnaldo Varela MD, ordering provider for TMS treatments.     Pre-Procedure:  History and Physical: Reviewed in medical record  Consent signed by: Randi Cleary for this course of treatment on: 3/6/2024      Clinical Narrative:  Patient tolerateing treatment. Patient is seeing her therapist for the last time today since they are leaving the office.    Indications for TMS:  MDD, recurrent, severe; 4+ medication trials (from 2+ classes) ineffective; Psychotherapy ineffective     Procedure Diagnosis:  MDD, severe, recurrent, F33.2    Treatment Hx:  Treatment number this series: 8  Total lifetime treatment number: 8    Allergies   Allergen Reactions    Serotonin Reuptake Inhibitors (Ssris) Anxiety, Difficulty breathing, Headache, Palpitations and Shortness Of Breath    Buspirone      The patient states she had serotonin syndrome    Cephalexin      Other reaction(s): unknown rxn.    Desvenlafaxine      Serotonin syndrome    Diclofenac Sodium [Diclofenac]      Serotonin syndrome and restless legs syndrome    Gabapentin      Drove on the wrong side of the highway    Levofloxacin      \"CAN'T REMEMBER\"    Penicillins      \"SORES IN MOUTH\"    Riluzole Difficulty breathing and Swelling    Sulfa Antibiotics      \"PT DOES NOT KNOW WHAT THE REACTION WAS\"    Topiramate Other (See Comments)     Frequent urination      LMP  (LMP Unknown)     Pause for the Cause  Right patient: Yes  Right procedure/correct coil:  Yes; rTMS; [id number]; H1 coil.   Earplugs in place:  Yes    Procedure  Patient was seated in procedure chair. Identity and procedure was verified. Ear plugs were placed in ears and patient-specific cap was placed on head and tightened appropriately. Ruler locations were " verified. Bone conducting headphones were not used.  Coil was placed at 0 - eyebrow - 14 and stimulator was set to parameters below.  Initial train was well tolerated so 55 trains were delivered.  Patient tolerated procedure well with minimal right eyebrow movement.      Motor Threshold Determination  Distance from nasion to inion: 35.5cm  MT 1: 0 - 7 - 14 @ 61% on 03/06/2024    Standard LDLPFC  Frequency: 18  Train Duration: 2  Total pulses delivered: 1980  Inter-train interval: 20  Tx Loc: 0 - eyebrows - 14    Energy: 73% (120%MT)  Trains: 55      *use spacer, legs raised*      Date MADRS QIDS PHQ-9   03/06/2024 33 18 21   3/15/2024  13 15                                           3/12/2024    12:10 PM 3/14/2024     8:27 AM 3/15/2024    10:46 AM   PHQ-9 SCORE   PHQ-9 Total Score MyChart 17 (Moderately severe depression) 16 (Moderately severe depression)    PHQ-9 Total Score 17 16    16 15     Plan   - Increase energy as tolerated  - Continue TMS treatments    The service provided today was under the supervising provider of the day, Elías Austin MD, who was available if needed.     Vee Loja, TMS Technician  [facility] Neuromodulation

## 2024-03-15 NOTE — PROGRESS NOTES
Called Friday afternoon.  Reported she had new shoes, tripped and fell and spilled most of her oxycodone.  She is asking for early refill for 1 week.    I reminded her [place] stoma prescriptions are not refilled.  I offered her withdrawal medications.    She was upset, hung up

## 2024-03-17 ENCOUNTER — HEALTH MAINTENANCE LETTER (OUTPATIENT)
Age: 69
End: 2024-03-17

## 2024-03-18 ENCOUNTER — OFFICE VISIT (OUTPATIENT)
Dept: PSYCHIATRY | Facility: CLINIC | Age: 69
End: 2024-03-18
Payer: MEDICARE

## 2024-03-18 DIAGNOSIS — F33.2 SEVERE EPISODE OF RECURRENT MAJOR DEPRESSIVE DISORDER, WITHOUT PSYCHOTIC FEATURES (H): Primary | ICD-10-CM

## 2024-03-18 RX ORDER — OXYCODONE HYDROCHLORIDE 5 MG/1
5 TABLET ORAL 4 TIMES DAILY
Qty: 112 TABLET | Refills: 0 | OUTPATIENT
Start: 2024-03-18

## 2024-03-18 ASSESSMENT — PATIENT HEALTH QUESTIONNAIRE - PHQ9
SUM OF ALL RESPONSES TO PHQ QUESTIONS 1-9: 18
10. IF YOU CHECKED OFF ANY PROBLEMS, HOW DIFFICULT HAVE THESE PROBLEMS MADE IT FOR YOU TO DO YOUR WORK, TAKE CARE OF THINGS AT HOME, OR GET ALONG WITH OTHER PEOPLE: VERY DIFFICULT

## 2024-03-18 NOTE — PROGRESS NOTES
" Interventional Psychiatry Program  [address], [address]  [place] [address]  TMS Procedure Note   Randi Cleary MRN# [id number]  Age: 68 year old    YOB: 1955    Winnie Cleary comes into clinic today at the request of, Arnaldo Varela MD, ordering provider for TMS treatments.     Pre-Procedure:  History and Physical: Reviewed in medical record  Consent signed by: Randi Claery for this course of treatment on: 3/6/2024      Clinical Narrative:  Patient tolerateing treatment. Patient had a rough weekend. They are worried because they dropped a bottle of their medication.    Indications for TMS:  MDD, recurrent, severe; 4+ medication trials (from 2+ classes) ineffective; Psychotherapy ineffective     Procedure Diagnosis:  MDD, severe, recurrent, F33.2    Treatment Hx:  Treatment number this series: 9  Total lifetime treatment number: 9    Allergies   Allergen Reactions    Serotonin Reuptake Inhibitors (Ssris) Anxiety, Difficulty breathing, Headache, Palpitations and Shortness Of Breath    Buspirone      The patient states she had serotonin syndrome    Cephalexin      Other reaction(s): unknown rxn.    Desvenlafaxine      Serotonin syndrome    Diclofenac Sodium [Diclofenac]      Serotonin syndrome and restless legs syndrome    Gabapentin      Drove on the wrong side of the highway    Levofloxacin      \"CAN'T REMEMBER\"    Penicillins      \"SORES IN MOUTH\"    Riluzole Difficulty breathing and Swelling    Sulfa Antibiotics      \"PT DOES NOT KNOW WHAT THE REACTION WAS\"    Topiramate Other (See Comments)     Frequent urination      LMP  (LMP Unknown)     Pause for the Cause  Right patient: Yes  Right procedure/correct coil:  Yes; rTMS; [id number]; H1 coil.   Earplugs in place:  Yes    Procedure  Patient was seated in procedure chair. Identity and procedure was verified. Ear plugs were placed in ears and patient-specific cap was placed on head and tightened appropriately. Ruler locations " were verified. Bone conducting headphones were not used.  Coil was placed at 0 - eyebrow - 14 and stimulator was set to parameters below.  Initial train was well tolerated so 55 trains were delivered.  Patient tolerated procedure well with minimal right eyebrow movement.      Motor Threshold Determination  Distance from nasion to inion: 35.5cm  MT 1: 0 - 7 - 14 @ 61% on 03/06/2024    Standard LDLPFC  Frequency: 18  Train Duration: 2  Total pulses delivered: 1980  Inter-train interval: 20  Tx Loc: 0 - eyebrows - 14    Energy: 73% (120%MT)  Trains: 55      *use spacer, legs raised*      Date MADRS QIDS PHQ-9   03/06/2024 33 18 21   3/15/2024  13 15                                           3/14/2024     8:27 AM 3/15/2024    10:46 AM 3/18/2024     7:31 AM   PHQ-9 SCORE   PHQ-9 Total Score MyChart 16 (Moderately severe depression)  18 (Moderately severe depression)   PHQ-9 Total Score 16    16 15 18    18     Plan   - Increase energy as tolerated  - Continue TMS treatments    The service provided today was under the supervising provider of the day, Reva Hutchison MD, who was available if needed.     Vee Loja, TMS Technician  [facility] Neuromodulation

## 2024-03-19 ENCOUNTER — OFFICE VISIT (OUTPATIENT)
Dept: PSYCHIATRY | Facility: CLINIC | Age: 69
End: 2024-03-19
Payer: MEDICARE

## 2024-03-19 ENCOUNTER — DOCUMENTATION ONLY (OUTPATIENT)
Facility: CLINIC | Age: 69
End: 2024-03-19

## 2024-03-19 ENCOUNTER — OFFICE VISIT (OUTPATIENT)
Dept: PSYCHIATRY | Facility: CLINIC | Age: 69
End: 2024-03-19
Attending: PSYCHOLOGIST
Payer: MEDICARE

## 2024-03-19 DIAGNOSIS — M10.9 URIC ACID ARTHROPATHY: Primary | ICD-10-CM

## 2024-03-19 DIAGNOSIS — F43.10 PTSD (POST-TRAUMATIC STRESS DISORDER): ICD-10-CM

## 2024-03-19 DIAGNOSIS — F41.1 GENERALIZED ANXIETY DISORDER: ICD-10-CM

## 2024-03-19 DIAGNOSIS — F31.81 BIPOLAR 2 DISORDER (H): Primary | ICD-10-CM

## 2024-03-19 DIAGNOSIS — J44.9 CHRONIC OBSTRUCTIVE PULMONARY DISEASE, UNSPECIFIED COPD TYPE (H): ICD-10-CM

## 2024-03-19 DIAGNOSIS — F33.2 SEVERE EPISODE OF RECURRENT MAJOR DEPRESSIVE DISORDER, WITHOUT PSYCHOTIC FEATURES (H): Primary | ICD-10-CM

## 2024-03-19 PROCEDURE — 90853 GROUP PSYCHOTHERAPY: CPT | Mod: GC | Performed by: PSYCHOLOGIST

## 2024-03-19 RX ORDER — ALBUTEROL SULFATE 90 UG/1
2 AEROSOL, METERED RESPIRATORY (INHALATION) EVERY 6 HOURS PRN
Qty: 18 G | Refills: 11 | Status: SHIPPED | OUTPATIENT
Start: 2024-03-19

## 2024-03-19 ASSESSMENT — PATIENT HEALTH QUESTIONNAIRE - PHQ9
10. IF YOU CHECKED OFF ANY PROBLEMS, HOW DIFFICULT HAVE THESE PROBLEMS MADE IT FOR YOU TO DO YOUR WORK, TAKE CARE OF THINGS AT HOME, OR GET ALONG WITH OTHER PEOPLE: VERY DIFFICULT
SUM OF ALL RESPONSES TO PHQ QUESTIONS 1-9: 17
10. IF YOU CHECKED OFF ANY PROBLEMS, HOW DIFFICULT HAVE THESE PROBLEMS MADE IT FOR YOU TO DO YOUR WORK, TAKE CARE OF THINGS AT HOME, OR GET ALONG WITH OTHER PEOPLE: VERY DIFFICULT

## 2024-03-19 NOTE — PROGRESS NOTES
This patient has an upcoming lab only appointment. Please verify which labs need to be drawn. If patient is needing blood gas venous draw, I will call the patient to be drawn at the hospital. We do not carry the supplies for this test.     Thanks,  Bindu Pratt

## 2024-03-19 NOTE — PROGRESS NOTES
" Interventional Psychiatry Program  [address], [address]  [place] [address]  TMS Procedure Note   Randi Cleary MRN# [id number]  Age: 68 year old    YOB: 1955    Winnie Cleary comes into clinic today at the request of, Arnaldo Varela MD, ordering provider for TMS treatments.     Pre-Procedure:  History and Physical: Reviewed in medical record  Consent signed by: Randi Cleary for this course of treatment on: 3/6/2024      Clinical Narrative:  Patient tolerating treatment. Has an early appointment tomorrow morning for pain. Went to group this morning.    Indications for TMS:  MDD, recurrent, severe; 4+ medication trials (from 2+ classes) ineffective; Psychotherapy ineffective     Procedure Diagnosis:  MDD, severe, recurrent, F33.2    Treatment Hx:  Treatment number this series: 10  Total lifetime treatment number: 10    Allergies   Allergen Reactions    Serotonin Reuptake Inhibitors (Ssris) Anxiety, Difficulty breathing, Headache, Palpitations and Shortness Of Breath    Buspirone      The patient states she had serotonin syndrome    Cephalexin      Other reaction(s): unknown rxn.    Desvenlafaxine      Serotonin syndrome    Diclofenac Sodium [Diclofenac]      Serotonin syndrome and restless legs syndrome    Gabapentin      Drove on the wrong side of the highway    Levofloxacin      \"CAN'T REMEMBER\"    Penicillins      \"SORES IN MOUTH\"    Riluzole Difficulty breathing and Swelling    Sulfa Antibiotics      \"PT DOES NOT KNOW WHAT THE REACTION WAS\"    Topiramate Other (See Comments)     Frequent urination      LMP  (LMP Unknown)     Pause for the Cause  Right patient: Yes  Right procedure/correct coil:  Yes; rTMS; [id number]; H1 coil.   Earplugs in place:  Yes    Procedure  Patient was seated in procedure chair. Identity and procedure was verified. Ear plugs were placed in ears and patient-specific cap was placed on head and tightened appropriately. Ruler locations were verified. " Bone conducting headphones were not used.  Coil was placed at 0 - eyebrow - 14 and stimulator was set to parameters below.  Initial train was well tolerated so 55 trains were delivered.  Patient tolerated procedure well with minimal right eyebrow movement.      Motor Threshold Determination  Distance from nasion to inion: 35.5cm  MT 1: 0 - 7 - 14 @ 61% on 03/06/2024    Standard LDLPFC  Frequency: 18  Train Duration: 2  Total pulses delivered: 1980  Inter-train interval: 20  Tx Loc: 0 - eyebrows - 14    Energy: 73% (120%MT)  Trains: 55      *Use spacer, legs raised*      Date MADRS QIDS PHQ-9   03/06/2024 33 18 21   3/15/2024  13 15                                           3/15/2024    10:46 AM 3/18/2024     7:31 AM 3/19/2024     8:12 AM   PHQ-9 SCORE   PHQ-9 Total Score MyChart  18 (Moderately severe depression) 17 (Moderately severe depression)   PHQ-9 Total Score 15 18    18    18 17    17     Plan   - Continue TMS treatments    The service provided today was under the supervising provider of the day, DHRUV Warren MD, who was available if needed.     Venice Beckham, TMS Technician  [facility] Neuromodulation

## 2024-03-20 ENCOUNTER — OFFICE VISIT (OUTPATIENT)
Dept: PALLIATIVE MEDICINE | Facility: OTHER | Age: 69
End: 2024-03-20
Attending: ANESTHESIOLOGY
Payer: MEDICARE

## 2024-03-20 ENCOUNTER — OFFICE VISIT (OUTPATIENT)
Dept: PSYCHIATRY | Facility: CLINIC | Age: 69
End: 2024-03-20
Payer: MEDICARE

## 2024-03-20 ENCOUNTER — PATIENT OUTREACH (OUTPATIENT)
Dept: ONCOLOGY | Facility: CLINIC | Age: 69
End: 2024-03-20
Payer: MEDICARE

## 2024-03-20 VITALS
OXYGEN SATURATION: 95 % | DIASTOLIC BLOOD PRESSURE: 79 MMHG | SYSTOLIC BLOOD PRESSURE: 118 MMHG | WEIGHT: 205 LBS | HEART RATE: 80 BPM | BODY MASS INDEX: 33.09 KG/M2

## 2024-03-20 DIAGNOSIS — M19.071 OSTEOARTHRITIS OF RIGHT ANKLE AND FOOT: ICD-10-CM

## 2024-03-20 DIAGNOSIS — F33.2 SEVERE EPISODE OF RECURRENT MAJOR DEPRESSIVE DISORDER, WITHOUT PSYCHOTIC FEATURES (H): Primary | ICD-10-CM

## 2024-03-20 PROCEDURE — G0463 HOSPITAL OUTPT CLINIC VISIT: HCPCS | Performed by: ANESTHESIOLOGY

## 2024-03-20 PROCEDURE — 99214 OFFICE O/P EST MOD 30 MIN: CPT | Performed by: ANESTHESIOLOGY

## 2024-03-20 RX ORDER — OXYCODONE HYDROCHLORIDE 5 MG/1
5 TABLET ORAL
Qty: 140 TABLET | Refills: 0 | Status: SHIPPED | OUTPATIENT
Start: 2024-03-20 | End: 2024-05-08

## 2024-03-20 ASSESSMENT — PAIN SCALES - GENERAL: PAINLEVEL: WORST PAIN (10)

## 2024-03-20 ASSESSMENT — PATIENT HEALTH QUESTIONNAIRE - PHQ9: SUM OF ALL RESPONSES TO PHQ QUESTIONS 1-9: 17

## 2024-03-20 NOTE — PROGRESS NOTES
Patient presents to the clinic today for a visit with AXEL WIGGINS MD regarding Pain Management.          10/18/2023    11:00 AM 1/17/2024    10:16 AM 3/20/2024     7:54 AM   PEG Score   PEG Total Score 7 6.33 9.67       UDS/CSA- 10.18.2023    Medications- Oxy today @7am    Notes stabbing pains in her hands. Restless leg syndrome. She thought maybe she is out of sync with her meds. She has used the gabapentin for her restless legs and has only used it when really jumping. Pharmacist thought she should not be taken her gabapentin.     Lorena Loo  [facility] Clinical Assistant

## 2024-03-20 NOTE — PATIENT INSTRUCTIONS
[facility] Pain Management Center [facility] Number:  [phone]  Call with any questions about your care and for scheduling assistance.   Calls are returned Monday through Friday between 8 AM and 4:30 PM. We usually get back to you within 2 business days depending on the issue/request.    If we are prescribing your medications:  For opioid medication refills, call the clinic or send a [employer]hart message 7 days in advance.  Please include:  Name of requested medication  Name of the pharmacy.  For non-opioid medications, call your pharmacy directly to request a refill. Please allow 3-4 days to be processed.   Per [place] State Law:  All controlled substance prescriptions must be filled within 30 days of being written.    For those controlled substances allowing refills, pickup must occur within 30 days of last fill.      We believe regular attendance is key to your success in our program!    Any time you are unable to keep your appointment we ask that you call us at least 24 hours in advance to cancel.This will allow us to offer the appointment time to another patient.   Multiple missed appointments may lead to dismissal from the clinic.     PLAN:    You are planning on surgery for your shoulders with [facility] orthopedics when you complete TMS.    You are going to establish with a new psychotherapist    You are looking for a place that you can do paraffin treatments for your hands.    Discussed the role of frequency specific microcurrent  May contact these providers to see if they take your insurance or you want to work with transitions [phone], [facility] [phone], [facility] [phone], [facility] [phone].    A [employer] device to help with joint pain has been ordered, you will be contacted by the company if your insurance covers    Discussed cetyl myristoleate (CMO) to help with neck and hand pain.  You may obtain through [employer].  [facility] Tresa car's 1  brand.  2 capsules 3 times a day for 4 days, 2 capsules twice a day for 2 weeks, may try decreasing to 2 capsules daily.    After 1 month trial of the cetyl myristoleate may contact Dr. Dubois for a trial of the methylene blue to help with arthritis.    Increase oxycodone to 5 mg 5 tablets in a day.    Follow-up with Dr. Dubois for a return visit in 8 weeks

## 2024-03-20 NOTE — PROGRESS NOTES
[facility]: Cancer Care                                                                                          Call from Yuri at [facility] lab asking for clarification on labs.  Discussed with Dr Pablo, pt had previous order for venous blood gas, and this can be cancelled per Dr Pablo.  Orders updated.  Pt will need CBC diff, UA, CMP, and ferritin drawn on 3/29.    CORAL DuffyN, RN  RN Care Coordinator  [facility]

## 2024-03-20 NOTE — PROGRESS NOTES
" Interventional Psychiatry Program  [address], [address]  [place] [address]  TMS Procedure Note   Randi Cleary MRN# [id number]  Age: 68 year old    YOB: 1955    Winnie Cleary comes into clinic today at the request of, Arnaldo Varela MD, ordering provider for TMS treatments.     Pre-Procedure:  History and Physical: Reviewed in medical record  Consent signed by: Randi Cleary for this course of treatment on: 3/6/2024      Clinical Narrative:  Patient tolerating treatment. Has a busy day today with appointments. Did not get the best of sleep last night.    Indications for TMS:  MDD, recurrent, severe; 4+ medication trials (from 2+ classes) ineffective; Psychotherapy ineffective     Procedure Diagnosis:  MDD, severe, recurrent, F33.2    Treatment Hx:  Treatment number this series: 11  Total lifetime treatment number: 11    Allergies   Allergen Reactions    Serotonin Reuptake Inhibitors (Ssris) Anxiety, Difficulty breathing, Headache, Palpitations and Shortness Of Breath    Buspirone      The patient states she had serotonin syndrome    Cephalexin      Other reaction(s): unknown rxn.    Desvenlafaxine      Serotonin syndrome    Diclofenac Sodium [Diclofenac]      Serotonin syndrome and restless legs syndrome    Gabapentin      Drove on the wrong side of the highway    Levofloxacin      \"CAN'T REMEMBER\"    Penicillins      \"SORES IN MOUTH\"    Riluzole Difficulty breathing and Swelling    Sulfa Antibiotics      \"PT DOES NOT KNOW WHAT THE REACTION WAS\"    Topiramate Other (See Comments)     Frequent urination      LMP  (LMP Unknown)     Pause for the Cause  Right patient: Yes  Right procedure/correct coil:  Yes; rTMS; [id number]; H1 coil.   Earplugs in place:  Yes    Procedure  Patient was seated in procedure chair. Identity and procedure was verified. Ear plugs were placed in ears and patient-specific cap was placed on head and tightened appropriately. Ruler locations were verified. " Bone conducting headphones were not used.  Coil was placed at 0 - eyebrow - 14 and stimulator was set to parameters below.  Initial train was well tolerated so 55 trains were delivered.  Patient tolerated procedure well with minimal right eyebrow movement.      Motor Threshold Determination  Distance from nasion to inion: 35.5cm  MT 1: 0 - 7 - 14 @ 61% on 03/06/2024    Standard LDLPFC  Frequency: 18  Train Duration: 2  Total pulses delivered: 1980  Inter-train interval: 20  Tx Loc: 0 - eyebrows - 14    Energy: 73% (120%MT)  Trains: 55      *Use spacer, legs raised*      Date MADRS QIDS PHQ-9   03/06/2024 33 18 21   3/15/2024  13 15                                           3/15/2024    10:46 AM 3/18/2024     7:31 AM 3/19/2024     8:12 AM   PHQ-9 SCORE   PHQ-9 Total Score MyChart  18 (Moderately severe depression) 17 (Moderately severe depression)   PHQ-9 Total Score 15 18    18    18 17    17     Plan   - Continue TMS treatments    The service provided today was under the supervising provider of the day, Reva Hutchison MD, who was available if needed.     Vee Loja, TMS Technician  [facility] Neuromodulation

## 2024-03-20 NOTE — PROGRESS NOTES
"                          [facility] Management Center Follow-up    Date of visit: 3/20/2024    Chief complaint:   Chief Complaint   Patient presents with    Pain     For arthralgias, cervical radiculopathy.  Interval history:  Reviewing the record is involved in the transcranial magnetic stimulation program.    She had called on 3/15 in the evening reporting that she had spilled some of her oxycodone, requesting a refill.  Reminded that our policy indicates loss of stoma prescriptions are not refilled, she was distressed, hung up.    Today she has ice pack on her right hand.  Describes \"somewhat let it go\", reviews they did carpal tunnel surgery \"in a gouty hand\".  Has had problems with flareups with arthritis in her hand.    On her left third finger has subluxation and wears a brace.  Describes when she ambulates with a cane for balance flares up her right hand.    Has transition to [facility] orthopedics.  Was to have shoulder surgery and then started the TMS program.  Will likely pursue that when she is gone.    She finds with the TMS 5 days a week can be a challenge.  Yesterday had to park in a different area and ambulate a long way to get there.  She does find might be having some improvement.  Used to have panic attacks when leaving the house daily and she is more calm.  Her  has noted that.    At her last visit with her long-term psychotherapist.  She has been referred to a different therapist for video visits.    Reviews frustration with neck pain.  Had followed with Dr. Caballero in neurosurgery told she is not a surgical candidate.  Does not want to have injections presently as she has been cautious the number of steroid she has.    Reviews her frustration with the [facility] and some of the financial aspects.    Notes establish a new primary care provider as well.    Demonstrates she has gotten new pubic shoes.  Her right foot has 1-1/2 size is smaller than the other, describes as " "because of the new shoes that she tripped and spills her medication.    She is planning to repeat her sleep study, has lost some 30 pounds over the last year.  Still has restless leg syndrome.    Taking medical cannabis which helps somewhat.    Is prescribed oxycodone 5 mg 4 times a day.  Describes with the TMS treatments it \"Rattlesthe bolts\" flares up her neck pain.  Sometimes it can be hard to want to get out of the house.    Urine drug test in October BOP reviewed.    At the last appointment we had discussed frequency specific microcurrent.  Acknowledged she forgot to pursue that.  We had also discussed the Z[employer]nexdevice last time.  She would like to look into that    I had discussed cetyl myristoleate for her hand and neck pain.  She had forgotten that.  Discussed it may help with some aspects of the facet joints in the hand joints.  Interested in its use.  Reviewed next would consider methylene blue.    She is also interested in a place to have paraffin treatments for her hands.          Medications:  Current Outpatient Medications   Medication Sig Dispense Refill    albuterol (VENTOLIN HFA) 108 (90 Base) MCG/ACT inhaler Inhale 2 puffs into the lungs every 6 hours as needed for shortness of breath, wheezing or cough 18 g 11    allopurinol (ZYLOPRIM) 300 MG tablet Take 1 tablet (300 mg) by mouth daily 90 tablet 3    benzonatate (TESSALON) 200 MG capsule Take 1 capsule (200 mg) by mouth 3 times daily as needed for cough 30 capsule 1    Cyanocobalamin (VITAMIN B-12) 5000 MCG SUBL Place 2-3 sprays under the tongue daily Unknown dose. 2 or 3 sprays/day      ethacrynic acid (EDECRIN) 25 MG tablet Half pill every day 45 tablet 3    Fluticasone-Umeclidin-Vilanterol (TRELEGY ELLIPTA) 200-62.5-25 MCG/ACT oral inhaler Inhale 1 puff into the lungs daily 1 each 11    guaiFENesin (MUCINEX) 600 MG 12 hr tablet Take 1,200 mg by mouth 2 times daily as needed for congestion      KLOR-CON 20 MEQ CR tablet Take 1 tablet (20 mEq) by " mouth daily 90 tablet 3    MAGNESIUM CITRATE PO Take 235 mg by mouth at bedtime      medical cannabis (Patient's own supply) See Admin Instructions (The purpose of this order is to document that the patient reports taking medical cannabis.  This is not a prescription, and is not used to certify that the patient has a qualifying medical condition.)  Flower      naloxone (NARCAN) 4 MG/0.1ML nasal spray Spray 1 spray (4 mg) into one nostril alternating nostrils as needed for opioid reversal every 2-3 minutes until assistance arrives 0.2 mL 0    omeprazole (PRILOSEC) 20 MG DR capsule Take 1 capsule (20 mg) by mouth daily 90 capsule 3    ondansetron (ZOFRAN ODT) 4 MG ODT tab DISSOLVE 1 TABLET UNDER THE TONGUE EVERY 6 HOURS AS NEEDED FOR NAUSEA*      oxyCODONE (ROXICODONE) 5 MG tablet Take 1 tablet (5 mg) by mouth 5 times daily May fill 4/6 for 4/8 140 tablet 0    rOPINIRole (REQUIP) 2 MG tablet One tab 3 pm, one bedtime, and one during night on waking 90 tablet 3    STATIN NOT PRESCRIBED (INTENTIONAL) Please choose reason not prescribed from choices below.      SYNTHROID 150 MCG tablet MON to SAT 1 tablet/day; SUN 0.5 tablet 90 tablet 4    vitamin C (ASCORBIC ACID) 1000 MG TABS Take 1,000 mg by mouth daily      vitamin D3 (CHOLECALCIFEROL) 250 mcg (10569 units) capsule Take 1 capsule by mouth once a week      zolpidem (AMBIEN) 5 MG tablet Take tablet by mouth 15 minutes prior to sleep, for Sleep Study 1 tablet 0           Physical Exam:  Blood pressure 118/79, pulse 80, weight 93 kg (205 lb), SpO2 95%, not currently breastfeeding.    Alert, clear sensorium.  No respiratory distress.  Right hand shows the carpal tunnel surgery scar, flares up when she uses a cane.  Left hand has a splint supporting her third finger.    Affect full range, tearful at times than smile wider affect.      Assessment:   Assessment, history of cervical surgery, continues with degenerative changes some radiculopathy.  Has had physical therapy,  anesthetic interventions.  Cautious about further steroid injections as she concerned about the effect of the bones.    Comorbid mood disorder presently in the transcranial magnetic stimulation program.    Plan as above.    Total time 35 minutes moderate complexity     minutes spent on the date of encounter doing chart review, history, and exam documentation and further activities as noted above.     Dusty Dubois MD  [facility]

## 2024-03-21 ENCOUNTER — OFFICE VISIT (OUTPATIENT)
Dept: PSYCHIATRY | Facility: CLINIC | Age: 69
End: 2024-03-21
Payer: MEDICARE

## 2024-03-21 DIAGNOSIS — F33.2 SEVERE EPISODE OF RECURRENT MAJOR DEPRESSIVE DISORDER, WITHOUT PSYCHOTIC FEATURES (H): Primary | ICD-10-CM

## 2024-03-21 NOTE — PROGRESS NOTES
" Interventional Psychiatry Program  [address], [address]  [place] [address]  TMS Procedure Note   Randi Cleary MRN# [id number]  Age: 68 year old    YOB: 1955    Winnie Cleary comes into clinic today at the request of, Arnaldo Varela MD, ordering provider for TMS treatments.     Pre-Procedure:  History and Physical: Reviewed in medical record  Consent signed by: Randi Cleary for this course of treatment on: 3/6/2024      Clinical Narrative:  Patient tolerating treatment. Patient had a long day yesterday. Is having a hard time getting to bed at night.    Indications for TMS:  MDD, recurrent, severe; 4+ medication trials (from 2+ classes) ineffective; Psychotherapy ineffective     Procedure Diagnosis:  MDD, severe, recurrent, F33.2    Treatment Hx:  Treatment number this series: 12  Total lifetime treatment number: 12    Allergies   Allergen Reactions    Serotonin Reuptake Inhibitors (Ssris) Anxiety, Difficulty breathing, Headache, Palpitations and Shortness Of Breath    Buspirone      The patient states she had serotonin syndrome    Cephalexin      Other reaction(s): unknown rxn.    Desvenlafaxine      Serotonin syndrome    Diclofenac Sodium [Diclofenac]      Serotonin syndrome and restless legs syndrome    Gabapentin      Drove on the wrong side of the highway    Levofloxacin      \"CAN'T REMEMBER\"    Penicillins      \"SORES IN MOUTH\"    Riluzole Difficulty breathing and Swelling    Sulfa Antibiotics      \"PT DOES NOT KNOW WHAT THE REACTION WAS\"    Topiramate Other (See Comments)     Frequent urination      LMP  (LMP Unknown)     Pause for the Cause  Right patient: Yes  Right procedure/correct coil:  Yes; rTMS; [id number]; H1 coil.   Earplugs in place:  Yes    Procedure  Patient was seated in procedure chair. Identity and procedure was verified. Ear plugs were placed in ears and patient-specific cap was placed on head and tightened appropriately. Ruler locations were verified. " Bone conducting headphones were not used.  Coil was placed at 0 - eyebrow - 14 and stimulator was set to parameters below.  Initial train was well tolerated so 55 trains were delivered.  Patient tolerated procedure well with minimal right eyebrow movement.      Motor Threshold Determination  Distance from nasion to inion: 35.5cm  MT 1: 0 - 7 - 14 @ 61% on 03/06/2024    Standard LDLPFC  Frequency: 18  Train Duration: 2  Total pulses delivered: 1980  Inter-train interval: 20  Tx Loc: 0 - eyebrows - 14    Energy: 73% (120%MT)  Trains: 55      *Use spacer, legs raised*      Date MADRS QIDS PHQ-9   03/06/2024 33 18 21   3/15/2024  13 15                                           3/18/2024     7:31 AM 3/19/2024     8:12 AM 3/21/2024     9:01 AM   PHQ-9 SCORE   PHQ-9 Total Score MyChart 18 (Moderately severe depression) 17 (Moderately severe depression) 16 (Moderately severe depression)   PHQ-9 Total Score 18    18    18 17    17 16    16     Plan   - Continue TMS treatments    The service provided today was under the supervising provider of the day, DHRUV Warren MD, who was available if needed.     Vee Loja, TMS Technician  [facility]

## 2024-03-22 ENCOUNTER — OFFICE VISIT (OUTPATIENT)
Dept: PSYCHIATRY | Facility: CLINIC | Age: 69
End: 2024-03-22
Payer: MEDICARE

## 2024-03-22 DIAGNOSIS — F33.2 SEVERE EPISODE OF RECURRENT MAJOR DEPRESSIVE DISORDER, WITHOUT PSYCHOTIC FEATURES (H): Primary | ICD-10-CM

## 2024-03-22 NOTE — PROGRESS NOTES
" Interventional Psychiatry Program  [address], [address]  [place] [address]  TMS Procedure Note   Randi Cleary MRN# [id number]  Age: 68 year old    YOB: 1955    Winnie Cleary comes into clinic today at the request of, rAnaldo Varela MD, ordering provider for TMS treatments.     Pre-Procedure:  History and Physical: Reviewed in medical record  Consent signed by: Randi Cleary for this course of treatment on: 3/6/2024      Clinical Narrative:  Patient tolerating treatment. Patient is pretty tired today.    Indications for TMS:  MDD, recurrent, severe; 4+ medication trials (from 2+ classes) ineffective; Psychotherapy ineffective     Procedure Diagnosis:  MDD, severe, recurrent, F33.2    Treatment Hx:  Treatment number this series: 13  Total lifetime treatment number: 13    Allergies   Allergen Reactions    Serotonin Reuptake Inhibitors (Ssris) Anxiety, Difficulty breathing, Headache, Palpitations and Shortness Of Breath    Buspirone      The patient states she had serotonin syndrome    Cephalexin      Other reaction(s): unknown rxn.    Desvenlafaxine      Serotonin syndrome    Diclofenac Sodium [Diclofenac]      Serotonin syndrome and restless legs syndrome    Gabapentin      Drove on the wrong side of the highway    Levofloxacin      \"CAN'T REMEMBER\"    Penicillins      \"SORES IN MOUTH\"    Riluzole Difficulty breathing and Swelling    Sulfa Antibiotics      \"PT DOES NOT KNOW WHAT THE REACTION WAS\"    Topiramate Other (See Comments)     Frequent urination      LMP  (LMP Unknown)     Pause for the Cause  Right patient: Yes  Right procedure/correct coil:  Yes; rTMS; [id number]; H1 coil.   Earplugs in place:  Yes    Procedure  Patient was seated in procedure chair. Identity and procedure was verified. Ear plugs were placed in ears and patient-specific cap was placed on head and tightened appropriately. Ruler locations were verified. Bone conducting headphones were not used.  Coil " was placed at 0 - eyebrow - 14 and stimulator was set to parameters below.  Initial train was well tolerated so 55 trains were delivered.  Patient tolerated procedure well with minimal right eyebrow movement.      Motor Threshold Determination  Distance from nasion to inion: 35.5cm  MT 1: 0 - 7 - 14 @ 61% on 03/06/2024    Standard LDLPFC  Frequency: 18  Train Duration: 2  Total pulses delivered: 1980  Inter-train interval: 20  Tx Loc: 0 - eyebrows - 14    Energy: 73% (120%MT)  Trains: 55      *Use spacer, legs raised*      Date MADRS QIDS PHQ-9   03/06/2024 33 18 21   3/15/2024  13 15   3/22/24  16 18                                     3/19/2024     8:12 AM 3/21/2024     9:01 AM 3/22/2024    10:46 AM   PHQ-9 SCORE   PHQ-9 Total Score MyChart 17 (Moderately severe depression) 16 (Moderately severe depression)    PHQ-9 Total Score 17    17 16    16 18     Plan   - Continue TMS treatments    The service provided today was under the supervising provider of the day, Reva Hutchison MD, who was available if needed.     Vee Loja, TMS Technician  [facility]  Mental [facility] Neuromodulation

## 2024-03-26 ENCOUNTER — OFFICE VISIT (OUTPATIENT)
Dept: PSYCHIATRY | Facility: CLINIC | Age: 69
End: 2024-03-26
Payer: MEDICARE

## 2024-03-26 DIAGNOSIS — F33.2 SEVERE EPISODE OF RECURRENT MAJOR DEPRESSIVE DISORDER, WITHOUT PSYCHOTIC FEATURES (H): Primary | ICD-10-CM

## 2024-03-26 ASSESSMENT — PATIENT HEALTH QUESTIONNAIRE - PHQ9
SUM OF ALL RESPONSES TO PHQ QUESTIONS 1-9: 18
SUM OF ALL RESPONSES TO PHQ QUESTIONS 1-9: 18
10. IF YOU CHECKED OFF ANY PROBLEMS, HOW DIFFICULT HAVE THESE PROBLEMS MADE IT FOR YOU TO DO YOUR WORK, TAKE CARE OF THINGS AT HOME, OR GET ALONG WITH OTHER PEOPLE: VERY DIFFICULT
SUM OF ALL RESPONSES TO PHQ QUESTIONS 1-9: 18
SUM OF ALL RESPONSES TO PHQ QUESTIONS 1-9: 18
10. IF YOU CHECKED OFF ANY PROBLEMS, HOW DIFFICULT HAVE THESE PROBLEMS MADE IT FOR YOU TO DO YOUR WORK, TAKE CARE OF THINGS AT HOME, OR GET ALONG WITH OTHER PEOPLE: VERY DIFFICULT
10. IF YOU CHECKED OFF ANY PROBLEMS, HOW DIFFICULT HAVE THESE PROBLEMS MADE IT FOR YOU TO DO YOUR WORK, TAKE CARE OF THINGS AT HOME, OR GET ALONG WITH OTHER PEOPLE: VERY DIFFICULT
SUM OF ALL RESPONSES TO PHQ QUESTIONS 1-9: 18
SUM OF ALL RESPONSES TO PHQ QUESTIONS 1-9: 18

## 2024-03-26 NOTE — PROGRESS NOTES
" Interventional Psychiatry [facility]  [address], [address]  [place] [address]  TMS Procedure Note   Randi Cleary MRN# [id number]  Age: 68 year old    YOB: 1955    Winnie Cleary comes into clinic today at the request of, Arnaldo Varela MD, ordering provider for TMS treatments.     Pre-Procedure:  History and Physical: Reviewed in medical record  Consent signed by: Randi Cleary for this course of treatment on: 3/6/2024      Clinical Narrative:  Patient tolerating treatment. No changes reported.    Indications for TMS:  MDD, recurrent, severe; 4+ medication trials (from 2+ classes) ineffective; Psychotherapy ineffective     Procedure Diagnosis:  MDD, severe, recurrent, F33.2    Treatment Hx:  Treatment number this series: 14  Total lifetime treatment number: 14    Allergies   Allergen Reactions    Serotonin Reuptake Inhibitors (Ssris) Anxiety, Difficulty breathing, Headache, Palpitations and Shortness Of Breath    Buspirone      The patient states she had serotonin syndrome    Cephalexin      Other reaction(s): unknown rxn.    Desvenlafaxine      Serotonin syndrome    Diclofenac Sodium [Diclofenac]      Serotonin syndrome and restless legs syndrome    Gabapentin      Drove on the wrong side of the highway    Levofloxacin      \"CAN'T REMEMBER\"    Penicillins      \"SORES IN MOUTH\"    Riluzole Difficulty breathing and Swelling    Sulfa Antibiotics      \"PT DOES NOT KNOW WHAT THE REACTION WAS\"    Topiramate Other (See Comments)     Frequent urination      LMP  (LMP Unknown)     Pause for the Cause  Right patient: Yes  Right procedure/correct coil:  Yes; rTMS; [id number]; H1 coil.   Earplugs in place:  Yes    Procedure  Patient was seated in procedure chair. Identity and procedure was verified. Ear plugs were placed in ears and patient-specific cap was placed on head and tightened appropriately. Ruler locations were verified. Bone conducting headphones were not used.  Coil was placed " at 0 - eyebrow - 14 and stimulator was set to parameters below.  Initial train was well tolerated so 55 trains were delivered.  Patient tolerated procedure well with minimal right eyebrow movement.      Motor Threshold Determination  Distance from nasion to inion: 35.5cm  MT 1: 0 - 7 - 14 @ 61% on 03/06/2024    Standard LDLPFC  Frequency: 18  Train Duration: 2  Total pulses delivered: 1980  Inter-train interval: 20  Tx Loc: 0 - eyebrows - 14    Energy: 73% (120%MT)  Trains: 55      *Use spacer, legs raised*      Date MADRS QIDS PHQ-9   03/06/2024 33 18 21   3/15/2024  13 15   3/22/24  16 18                                     3/21/2024     9:01 AM 3/22/2024    10:46 AM 3/26/2024     8:29 AM   PHQ-9 SCORE   PHQ-9 Total Score MyChart 16 (Moderately severe depression)  18 (Moderately severe depression)   PHQ-9 Total Score 16    16 18 18    18    18     Plan   - Continue TMS treatments    The service provided today was under the supervising provider of the day, Simba Nguyen MD, who was available if needed.     Aparna Colindres, TMS Technician  [facility]  Mental [facility] Neuromodulation

## 2024-03-27 ENCOUNTER — OFFICE VISIT (OUTPATIENT)
Dept: PSYCHIATRY | Facility: CLINIC | Age: 69
End: 2024-03-27
Payer: MEDICARE

## 2024-03-27 ENCOUNTER — ALLIED HEALTH/NURSE VISIT (OUTPATIENT)
Dept: PSYCHIATRY | Facility: CLINIC | Age: 69
End: 2024-03-27
Payer: MEDICARE

## 2024-03-27 DIAGNOSIS — F33.2 SEVERE EPISODE OF RECURRENT MAJOR DEPRESSIVE DISORDER, WITHOUT PSYCHOTIC FEATURES (H): Primary | ICD-10-CM

## 2024-03-27 NOTE — PROGRESS NOTES
"\       [facility]      Women's Group Therapy., N=3  Co-facilitators:  Navin Delatorre and  Margo Valle (first group)  Start time:  10:30am   End Time: 11:55 am  Diagnoses:  Bi-polar-II, Generalized anxiety, PTSD, Alcohol dependency-in remission 1.5 years after 20 years of sobriety.    Date: 3/19/2024     Group:  S/O: Reviewed Homework and leftover topics from last week. Set new homework for the next group session       1.Importance of getting the recommended colonoscop and other screenings/hx of cancer in family and self  make it higher risk/-Another group member initiated that she would miss group next week due to colonoscopy next Tuesay. She reported that she is to do 2-days of prep. She reported that her mom and 3 sisters had had cancer. Winnie reported a  hx of breast cancer 30 years ago. .      2. Needing a sense of purpose/tigre:  A group member reported that in 2016 she  \"didn't wake up from from operation and they had to 'pinched me'\" From that experience she got in front of her whole [employer] and gave her \"testimony.\"    3.Danger of Living a \"FANTASY life\" through reading books , watching TV Scrolling through Facebook instead of real life being with people. Another group member  reported that she is on facebook or reading and gets so into it that it is her \"life that feels real but it is all fantasy.\"     Assessment:  Patient came on time. She was verbal and engaging. This is only her second group and she started in mid February. The hope is that she will commit to coming weekly. Part of this was saying goodbye to her individual therapist. .\". Some interpersonal difficulties with current friends and also feels that she lost friends when she went on disability in 1998. She reported feeling isolated and has found groups helpful in the past     MENTAL STATUS EXAM  Appearance: appropriate  Attitude: cooperative  Behavior: normal  Eyre Contact: " "normal  Speech: normal  Orientation: oreinted to person , place, time and situation  Mood:  admits loneliness and boredom.   Affect: Mood Congruient  Thought Process: clear  Suicidal Ideation: NO   Hallucination: no      Plan:Goals:  Make 2 friends:  \"feels shot down lately from friends. [facility].  Learn and apply skills to manage your mental health and intense emotions.     Homework: Come back next week. . Go to the pool at [facility] 1x.  "

## 2024-03-27 NOTE — PROGRESS NOTES
" Interventional Psychiatry Program  [address], [address]  [place] [address]  TMS Procedure Note   Randi Cleary MRN# [id number]  Age: 68 year old    YOB: 1955    Winnie Cleary comes into clinic today at the request of, Arnaldo Varela MD, ordering provider for TMS treatments.     Pre-Procedure:  History and Physical: Reviewed in medical record  Consent signed by: Randi Cleary for this course of treatment on: 3/6/2024      Clinical Narrative:  Patient tolerating treatment. Patient is having less overall anxiety, but is experiencing more intense spurts of anxiety.     Indications for TMS:  MDD, recurrent, severe; 4+ medication trials (from 2+ classes) ineffective; Psychotherapy ineffective     Procedure Diagnosis:  MDD, severe, recurrent, F33.2    Treatment Hx:  Treatment number this series: 15  Total lifetime treatment number: 15    Allergies   Allergen Reactions    Serotonin Reuptake Inhibitors (Ssris) Anxiety, Difficulty breathing, Headache, Palpitations and Shortness Of Breath    Buspirone      The patient states she had serotonin syndrome    Cephalexin      Other reaction(s): unknown rxn.    Desvenlafaxine      Serotonin syndrome    Diclofenac Sodium [Diclofenac]      Serotonin syndrome and restless legs syndrome    Gabapentin      Drove on the wrong side of the highway    Levofloxacin      \"CAN'T REMEMBER\"    Penicillins      \"SORES IN MOUTH\"    Riluzole Difficulty breathing and Swelling    Sulfa Antibiotics      \"PT DOES NOT KNOW WHAT THE REACTION WAS\"    Topiramate Other (See Comments)     Frequent urination      LMP  (LMP Unknown)     Pause for the Cause  Right patient: Yes  Right procedure/correct coil:  Yes; rTMS; [id number]; H1 coil.   Earplugs in place:  Yes    Procedure  Patient was seated in procedure chair. Identity and procedure was verified. Ear plugs were placed in ears and patient-specific cap was placed on head and tightened appropriately. Ruler locations " were verified. Bone conducting headphones were not used.  Coil was placed at 0 - eyebrow - 14 and stimulator was set to parameters below.  Initial train was well tolerated so 55 trains were delivered.  Patient tolerated procedure well with minimal right eyebrow movement.      Motor Threshold Determination  Distance from nasion to inion: 35.5cm  MT 1: 0 - 7 - 14 @ 61% on 03/06/2024    Standard LDLPFC  Frequency: 18  Train Duration: 2  Total pulses delivered: 1980  Inter-train interval: 20  Tx Loc: 0 - eyebrows - 14    Energy: 73% (120%MT)  Trains: 55      *Use spacer, legs raised*      Date MADRS QIDS PHQ-9   03/06/2024 33 18 21   3/15/2024  13 15   3/22/24  16 18                                     3/21/2024     9:01 AM 3/22/2024    10:46 AM 3/26/2024     8:29 AM   PHQ-9 SCORE   PHQ-9 Total Score MyChart 16 (Moderately severe depression)  18 (Moderately severe depression)   PHQ-9 Total Score 16    16 18 18    18    18     Plan   - Continue TMS treatments    The service provided today was under the supervising provider of the day, Reva Hutchison MD, who was available if needed.     Vee Loja, TMS Technician  [facility]

## 2024-03-27 NOTE — PROGRESS NOTES
"[facility]:  Care Coordination Note     SITUATION   Randi Cleary is a 68 year old female who has been receiving Transcranial Magnetic Stimulation (TMS) at the request of Arnaldo Varela MD.    BACKGROUND     During course of TMS    ASSESSMENT        Met with patient today to check on her during TMS course.    During today's discussion writer and patient discussed:    Patient reports that she has noted some brief glimpses of feeling better.  She notes that she had some \"euphoria\" towards the end of first full week of TMS.  However it was short lived and now she experiences a similar sense of this \"euphoria\" intermittently.  She notes that a lot of her distress is related to her physical health struggles.  She notes that her sleep is also related to her health as well.  She does report that her partner and friends have noticed a difference in her anxiety as well.         PHQ 9: 18  # of completed TMS Sessions: 15  Toleration of TMS: Tolerating well-no issues per patient.           PLAN       Nursing Interventions: TMS edu     Follow-up plan:  RN to continue to follow during TMS course     Orly Johnson RN   "

## 2024-03-28 ENCOUNTER — OFFICE VISIT (OUTPATIENT)
Dept: PSYCHIATRY | Facility: CLINIC | Age: 69
End: 2024-03-28
Payer: MEDICARE

## 2024-03-28 DIAGNOSIS — F33.2 SEVERE EPISODE OF RECURRENT MAJOR DEPRESSIVE DISORDER, WITHOUT PSYCHOTIC FEATURES (H): Primary | ICD-10-CM

## 2024-03-28 ASSESSMENT — PATIENT HEALTH QUESTIONNAIRE - PHQ9
SUM OF ALL RESPONSES TO PHQ QUESTIONS 1-9: 16
10. IF YOU CHECKED OFF ANY PROBLEMS, HOW DIFFICULT HAVE THESE PROBLEMS MADE IT FOR YOU TO DO YOUR WORK, TAKE CARE OF THINGS AT HOME, OR GET ALONG WITH OTHER PEOPLE: VERY DIFFICULT
10. IF YOU CHECKED OFF ANY PROBLEMS, HOW DIFFICULT HAVE THESE PROBLEMS MADE IT FOR YOU TO DO YOUR WORK, TAKE CARE OF THINGS AT HOME, OR GET ALONG WITH OTHER PEOPLE: VERY DIFFICULT

## 2024-03-28 NOTE — PROGRESS NOTES
" Interventional Psychiatry Program  [address], [address]  [place] [address]  TMS Procedure Note   Randi Cleary MRN# [id number]  Age: 68 year old    YOB: 1955    Winnie Cleary comes into clinic today at the request of, Arnaldo Varela MD, ordering provider for TMS treatments.     Pre-Procedure:  History and Physical: Reviewed in medical record  Consent signed by: Randi Cleary for this course of treatment on: 3/6/2024      Clinical Narrative:  Patient tolerating treatment. Patient did not get very good sleep last night so we ran treatment at 110%.    Indications for TMS:  MDD, recurrent, severe; 4+ medication trials (from 2+ classes) ineffective; Psychotherapy ineffective     Procedure Diagnosis:  MDD, severe, recurrent, F33.2    Treatment Hx:  Treatment number this series: 16  Total lifetime treatment number: 16    Allergies   Allergen Reactions    Serotonin Reuptake Inhibitors (Ssris) Anxiety, Difficulty breathing, Headache, Palpitations and Shortness Of Breath    Buspirone      The patient states she had serotonin syndrome    Cephalexin      Other reaction(s): unknown rxn.    Desvenlafaxine      Serotonin syndrome    Diclofenac Sodium [Diclofenac]      Serotonin syndrome and restless legs syndrome    Gabapentin      Drove on the wrong side of the highway    Levofloxacin      \"CAN'T REMEMBER\"    Penicillins      \"SORES IN MOUTH\"    Riluzole Difficulty breathing and Swelling    Sulfa Antibiotics      \"PT DOES NOT KNOW WHAT THE REACTION WAS\"    Topiramate Other (See Comments)     Frequent urination      LMP  (LMP Unknown)     Pause for the Cause  Right patient: Yes  Right procedure/correct coil:  Yes; rTMS; [id number]; H1 coil.   Earplugs in place:  Yes    Procedure  Patient was seated in procedure chair. Identity and procedure was verified. Ear plugs were placed in ears and patient-specific cap was placed on head and tightened appropriately. Ruler locations were verified. Bone " conducting headphones were not used.  Coil was placed at 0 - eyebrow - 14 and stimulator was set to parameters below.  Initial train was well tolerated so 55 trains were delivered.  Patient tolerated procedure well with minimal right eyebrow movement.      Motor Threshold Determination  Distance from nasion to inion: 35.5cm  MT 1: 0 - 7 - 14 @ 61% on 03/06/2024    Standard LDLPFC  Frequency: 18  Train Duration: 2  Total pulses delivered: 1980  Inter-train interval: 20  Tx Loc: 0 - eyebrows - 14    Energy: 73% (120%MT)  Trains: 55      *Use spacer, legs raised*      Date MADRS QIDS PHQ-9   03/06/2024 33 18 21   3/15/2024  13 15   3/22/24  16 18                                     3/22/2024    10:46 AM 3/26/2024     8:29 AM 3/28/2024     7:03 AM   PHQ-9 SCORE   PHQ-9 Total Score MyChart  18 (Moderately severe depression) 16 (Moderately severe depression)   PHQ-9 Total Score 18 18    18    18 16    16     Plan   - Continue TMS treatments    The service provided today was under the supervising provider of the day, DHRUV Warren MD, who was available if needed.     Vee Loja, TMS Technician  [facility]od[facility]

## 2024-03-29 ENCOUNTER — LAB (OUTPATIENT)
Dept: LAB | Facility: CLINIC | Age: 69
End: 2024-03-29
Payer: MEDICARE

## 2024-03-29 ENCOUNTER — OFFICE VISIT (OUTPATIENT)
Dept: PSYCHIATRY | Facility: CLINIC | Age: 69
End: 2024-03-29
Payer: MEDICARE

## 2024-03-29 DIAGNOSIS — I27.20 PULMONARY HTN (H): ICD-10-CM

## 2024-03-29 DIAGNOSIS — E83.110 HEREDITARY HEMOCHROMATOSIS (H): ICD-10-CM

## 2024-03-29 DIAGNOSIS — M10.9 URIC ACID ARTHROPATHY: ICD-10-CM

## 2024-03-29 DIAGNOSIS — R79.9 ABNORMAL FINDING OF BLOOD CHEMISTRY, UNSPECIFIED: ICD-10-CM

## 2024-03-29 DIAGNOSIS — R06.02 SHORTNESS OF BREATH: ICD-10-CM

## 2024-03-29 DIAGNOSIS — R30.0 DYSURIA: ICD-10-CM

## 2024-03-29 DIAGNOSIS — E89.0 POSTABLATIVE HYPOTHYROIDISM: ICD-10-CM

## 2024-03-29 DIAGNOSIS — F33.2 SEVERE EPISODE OF RECURRENT MAJOR DEPRESSIVE DISORDER, WITHOUT PSYCHOTIC FEATURES (H): Primary | ICD-10-CM

## 2024-03-29 DIAGNOSIS — E83.119 HEMOCHROMATOSIS, UNSPECIFIED HEMOCHROMATOSIS TYPE: ICD-10-CM

## 2024-03-29 LAB
ALBUMIN UR-MCNC: NEGATIVE MG/DL
APPEARANCE UR: CLEAR
BASOPHILS # BLD AUTO: 0 10E3/UL (ref 0–0.2)
BASOPHILS NFR BLD AUTO: 0 %
BILIRUB UR QL STRIP: NEGATIVE
COLOR UR AUTO: YELLOW
EOSINOPHIL # BLD AUTO: 0.1 10E3/UL (ref 0–0.7)
EOSINOPHIL NFR BLD AUTO: 2 %
ERYTHROCYTE [DISTWIDTH] IN BLOOD BY AUTOMATED COUNT: 12.9 % (ref 10–15)
GLUCOSE UR STRIP-MCNC: NEGATIVE MG/DL
HCT VFR BLD AUTO: 49.8 % (ref 35–47)
HGB BLD-MCNC: 17.2 G/DL (ref 11.7–15.7)
HGB UR QL STRIP: ABNORMAL
IMM GRANULOCYTES # BLD: 0 10E3/UL
IMM GRANULOCYTES NFR BLD: 0 %
KETONES UR STRIP-MCNC: NEGATIVE MG/DL
LEUKOCYTE ESTERASE UR QL STRIP: NEGATIVE
LYMPHOCYTES # BLD AUTO: 1.6 10E3/UL (ref 0.8–5.3)
LYMPHOCYTES NFR BLD AUTO: 21 %
MCH RBC QN AUTO: 33.3 PG (ref 26.5–33)
MCHC RBC AUTO-ENTMCNC: 34.5 G/DL (ref 31.5–36.5)
MCV RBC AUTO: 97 FL (ref 78–100)
MONOCYTES # BLD AUTO: 0.5 10E3/UL (ref 0–1.3)
MONOCYTES NFR BLD AUTO: 7 %
NEUTROPHILS # BLD AUTO: 5.4 10E3/UL (ref 1.6–8.3)
NEUTROPHILS NFR BLD AUTO: 70 %
NITRATE UR QL: NEGATIVE
PH UR STRIP: 5.5 [PH] (ref 5–8)
PLATELET # BLD AUTO: 195 10E3/UL (ref 150–450)
RBC # BLD AUTO: 5.16 10E6/UL (ref 3.8–5.2)
RBC #/AREA URNS AUTO: NORMAL /HPF
SP GR UR STRIP: <=1.005 (ref 1–1.03)
UROBILINOGEN UR STRIP-ACNC: 0.2 E.U./DL
WBC # BLD AUTO: 7.6 10E3/UL (ref 4–11)
WBC #/AREA URNS AUTO: NORMAL /HPF

## 2024-03-29 PROCEDURE — 84550 ASSAY OF BLOOD/URIC ACID: CPT

## 2024-03-29 PROCEDURE — 85025 COMPLETE CBC W/AUTO DIFF WBC: CPT

## 2024-03-29 PROCEDURE — 80053 COMPREHEN METABOLIC PANEL: CPT

## 2024-03-29 PROCEDURE — 84439 ASSAY OF FREE THYROXINE: CPT

## 2024-03-29 PROCEDURE — 36415 COLL VENOUS BLD VENIPUNCTURE: CPT

## 2024-03-29 PROCEDURE — 81001 URINALYSIS AUTO W/SCOPE: CPT

## 2024-03-29 PROCEDURE — 83880 ASSAY OF NATRIURETIC PEPTIDE: CPT

## 2024-03-29 PROCEDURE — 82728 ASSAY OF FERRITIN: CPT

## 2024-03-29 PROCEDURE — 84443 ASSAY THYROID STIM HORMONE: CPT

## 2024-03-29 NOTE — PROGRESS NOTES
" Interventional Psychiatry Program  [address], [address]  [place] [address]  TMS Procedure Note   Randi Cleary MRN# [id number]  Age: 68 year old    YOB: 1955    Winnie Cleary comes into clinic today at the request of, Arnaldo Varela MD, ordering provider for TMS treatments.     Pre-Procedure:  History and Physical: Reviewed in medical record  Consent signed by: Randi Cleary for this course of treatment on: 3/6/2024      Clinical Narrative:  Patient tolerating treatment. Patient had her  come with her today.     Indications for TMS:  MDD, recurrent, severe; 4+ medication trials (from 2+ classes) ineffective; Psychotherapy ineffective     Procedure Diagnosis:  MDD, severe, recurrent, F33.2    Treatment Hx:  Treatment number this series: 17  Total lifetime treatment number: 17    Allergies   Allergen Reactions    Serotonin Reuptake Inhibitors (Ssris) Anxiety, Difficulty breathing, Headache, Palpitations and Shortness Of Breath    Buspirone      The patient states she had serotonin syndrome    Cephalexin      Other reaction(s): unknown rxn.    Desvenlafaxine      Serotonin syndrome    Diclofenac Sodium [Diclofenac]      Serotonin syndrome and restless legs syndrome    Gabapentin      Drove on the wrong side of the highway    Levofloxacin      \"CAN'T REMEMBER\"    Penicillins      \"SORES IN MOUTH\"    Riluzole Difficulty breathing and Swelling    Sulfa Antibiotics      \"PT DOES NOT KNOW WHAT THE REACTION WAS\"    Topiramate Other (See Comments)     Frequent urination      LMP  (LMP Unknown)     Pause for the Cause  Right patient: Yes  Right procedure/correct coil:  Yes; rTMS; [id number]; H1 coil.   Earplugs in place:  Yes    Procedure  Patient was seated in procedure chair. Identity and procedure was verified. Ear plugs were placed in ears and patient-specific cap was placed on head and tightened appropriately. Ruler locations were verified. Bone conducting headphones were not " used.  Coil was placed at 0 - eyebrow - 14 and stimulator was set to parameters below.  Initial train was well tolerated so 55 trains were delivered.  Patient tolerated procedure well with minimal right eyebrow movement.      Motor Threshold Determination  Distance from nasion to inion: 35.5cm  MT 1: 0 - 7 - 14 @ 61% on 03/06/2024    Standard LDLPFC  Frequency: 18  Train Duration: 2  Total pulses delivered: 1980  Inter-train interval: 20  Tx Loc: 0 - eyebrows - 14    Energy: 73% (120%MT)  Trains: 55      *Use spacer, legs raised*      Date MADRS QIDS PHQ-9   03/06/2024 33 18 21   3/15/2024  13 15   3/22/24  16 18   3/29/24  16 8                               3/26/2024     8:29 AM 3/28/2024     7:03 AM 3/29/2024    11:09 AM   PHQ-9 SCORE   PHQ-9 Total Score MyChart 18 (Moderately severe depression) 16 (Moderately severe depression)    PHQ-9 Total Score 18    18    18 16    16 8     Plan   - Continue TMS treatments    The service provided today was under the supervising provider of the day, Reva Hutchison MD, who was available if needed.     Vee Loja, TMS Technician  [facility] Neuromodulation

## 2024-03-30 LAB
ALBUMIN SERPL BCG-MCNC: 4.3 G/DL (ref 3.5–5.2)
ALP SERPL-CCNC: 85 U/L (ref 40–150)
ALT SERPL W P-5'-P-CCNC: 24 U/L (ref 0–50)
ANION GAP SERPL CALCULATED.3IONS-SCNC: 10 MMOL/L (ref 7–15)
AST SERPL W P-5'-P-CCNC: 26 U/L (ref 0–45)
BILIRUB SERPL-MCNC: 0.4 MG/DL
BUN SERPL-MCNC: 15.1 MG/DL (ref 8–23)
CALCIUM SERPL-MCNC: 9.8 MG/DL (ref 8.8–10.2)
CHLORIDE SERPL-SCNC: 100 MMOL/L (ref 98–107)
CREAT SERPL-MCNC: 0.59 MG/DL (ref 0.51–0.95)
DEPRECATED HCO3 PLAS-SCNC: 29 MMOL/L (ref 22–29)
EGFRCR SERPLBLD CKD-EPI 2021: >90 ML/MIN/1.73M2
FERRITIN SERPL-MCNC: 71 NG/ML (ref 11–328)
GLUCOSE SERPL-MCNC: 93 MG/DL (ref 70–99)
NT-PROBNP SERPL-MCNC: 114 PG/ML (ref 0–900)
POTASSIUM SERPL-SCNC: 4.8 MMOL/L (ref 3.4–5.3)
PROT SERPL-MCNC: 7.2 G/DL (ref 6.4–8.3)
SODIUM SERPL-SCNC: 139 MMOL/L (ref 135–145)
T4 FREE SERPL-MCNC: 1.49 NG/DL (ref 0.9–1.7)
TSH SERPL DL<=0.005 MIU/L-ACNC: 1.31 UIU/ML (ref 0.3–4.2)
URATE SERPL-MCNC: 3.5 MG/DL (ref 2.4–5.7)

## 2024-04-01 ENCOUNTER — TELEPHONE (OUTPATIENT)
Dept: CARDIOLOGY | Facility: CLINIC | Age: 69
End: 2024-04-01

## 2024-04-01 ENCOUNTER — OFFICE VISIT (OUTPATIENT)
Dept: PSYCHIATRY | Facility: CLINIC | Age: 69
End: 2024-04-01
Payer: MEDICARE

## 2024-04-01 DIAGNOSIS — F33.2 SEVERE EPISODE OF RECURRENT MAJOR DEPRESSIVE DISORDER, WITHOUT PSYCHOTIC FEATURES (H): Primary | ICD-10-CM

## 2024-04-01 NOTE — PROGRESS NOTES
[facility]                                     Progress Note    Patient Name: Randi Cleary  Date: 4/4/2024         Service Type: Individual     Session Start Time: 2:35 p.m. Session End Time: 3:37 p.m.     Session Length: 62 minutes    Session #: 1st with this provider (patient is transferring due to previous provider's leave)    Attendees: Client attended alone    Service Modality:  Video Visit:      Provider verified identity through the following two-step process.  Patient provided:  Patient is known previously to provider and Patient was verified at admission/transfer    Telemedicine Visit: The patient's condition can be safely assessed and treated via synchronous audio and visual telemedicine encounter.      Reason for Telemedicine Visit: Patient convenience (e.g. access to timely appointments / distance to available provider)    Originating Site (Patient Location): Patient's home    Site (Provider Location): [facility]: [place]    Consent:  The patient/guardian has verbally consented to: the potential risks and benefits of telemedicine (video visit) versus in person care; bill my insurance or make self-payment for services provided; and responsibility for payment of non-covered services.     Patient would like the video invitation sent by:  My Chart    Mode of Communication:  Video Conference via [employer]    Location (Provider):  On-site    As the provider I attest to compliance with applicable laws and regulations related to telemedicine.      DATA  Extended Session (53+ minutes): PROLONGED SERVICE IN THE OUTPATIENT SETTING REQUIRING DIRECT (FACE-TO-FACE) PATIENT CONTACT BEYOND THE USUAL SERVICE:    - Longer session due to limited access to mental health appointments and necessity to address patient's distress / complexity    - Treatment protocol required additional time to complete, due to the nature of diagnosis being treated.  See Interventions section for  details  Interactive Complexity: No  Crisis: No      Progress Since Last Session (Related to Symptoms / Goals / Homework):   Symptoms:  Patient reports feeling more tired than usual and notes insomnia that may get in the way of TMS treatments; patient also appeared to have a significant wet-sounding cough throughout the session (provider encouraged pt to go to Urgent Care/ED as needed).    Homework:  Progress made       Episode of Care Goals: Satisfactory progress - ACTION (Actively working towards change); Intervened by reinforcing change plan / affirming steps taken     Current / Ongoing Stressors and Concerns:   Patient is currently socially isolated. She has a conflictual relationship with her .  She is getting minimal physical activity.  She has had several surgeries. Patient is currently completing TMS treatment.     Treatment Objective(s) Addressed in This Session:   Patient will increase frequency of engaging her in ADLs  Processed some family system issues and history of grief, loss, and trauma  Patient will track and record at least 5 pleasant exchanges with . Patient will be able to identify at least 5 positive traits about her .  Patient will reduce level of depressive and anxious features as evidenced by reduction in score on her CHAVO-7 and PHQ-9 (scores of 15 and 16 at first measurment, respectively).     Intervention:  Building rapport  Family systems dynamics and patterns  Narrative therapy:  DBT/CBT    The following assessments were completed by patient for this visit:    PHQ9:       3/19/2024     8:12 AM 3/21/2024     9:01 AM 3/22/2024    10:46 AM 3/26/2024     8:29 AM 3/28/2024     7:03 AM 3/29/2024    11:09 AM 4/1/2024     8:50 AM   PHQ-9 SCORE   PHQ-9 Total Score MyChart 17 (Moderately severe depression) 16 (Moderately severe depression)  18 (Moderately severe depression) 16 (Moderately severe depression)  17 (Moderately severe depression)   PHQ-9 Total Score 17    17 16     16 18 18    18    18 16    16 8 17    17     GAD7:       10/9/2023     8:55 AM 10/31/2023     4:25 AM 12/1/2023    11:37 AM 12/18/2023     3:09 PM 1/15/2024    12:02 PM 1/29/2024     3:10 AM 3/5/2024     3:08 PM   CHAVO-7 SCORE   Total Score 15 (severe anxiety) 15 (severe anxiety) 14 (moderate anxiety) 16 (severe anxiety) 14 (moderate anxiety) 16 (severe anxiety) 13 (moderate anxiety)   Total Score 15 15 14 16 14 16 13     PROMIS 10-Global Health (only subscores and total score):       7/21/2023    10:36 AM 7/28/2023     3:24 PM 8/7/2023     1:07 PM 9/20/2023    10:13 AM 10/31/2023     4:36 AM 12/1/2023    11:52 AM 1/22/2024    12:00 PM   PROMIS-10 Scores Only   Global Mental Health Score 5    5 5 6    6 5    5    5 6 5 5   Global Physical Health Score 7    7 7 8    8 9    9    9 8 8 9   PROMIS TOTAL - SUBSCORES 12    12 12 14    14 14    14    14 14 13 14      ASSESSMENT: Current Emotional / Mental Status (status of significant symptoms):   Risk status (Self / Other harm or suicidal ideation)   Patient denies current fears or concerns for personal safety.   Patient denies current or recent suicidal ideation or behaviors. Has hx of SI.   Patient denies current or recent homicidal ideation or behaviors.   Patient denies current or recent self injurious behavior or ideation.   Patient denies other safety concerns.   Patient reports there has been no change in risk factors since their last session.     Patient reports there has been no change in protective factors since their last session.     A safety and risk management plan has been developed including: Patient consented to co-developed safety plan on 11/24/2020, updated 2/20/23.  Safety and risk management plan was reviewed.   Patient agreed to use safety plan should any safety concerns arise.  A copy was made available to the patient.     Appearance:   Appropriate    Eye Contact:   Good    Psychomotor Behavior: Normal    Attitude:   Cooperative     Orientation:   All   Speech    Rate / Production: Normal/ Responsive    Volume:  Normal    Mood:    Anxious    Affect:    Appropriate  Tearful   Thought Content:  Clear    Thought Form:  Coherent    Insight:    Good      Medication Review:   No changes to current psychiatric medication(s)      Medication Compliance:   Yes     Changes in Health Issues:   Patient had a wet-sounding cough during the sessions and would have bouts of extreme coughing; patient states that she has a medication she can take as well as her inhalers.     Chemical Use Review:   Substance Use: Chemical use reviewed, no active concerns identified ; Nothing used since August 2021.     Tobacco Use: No current tobacco use.      Diagnosis:  1. Bipolar 2 disorder (H)    2. Trauma and stressor-related disorder    3. Generalized anxiety disorder      Borderline personality disorder vs. Bipolar 2    Note: There has been demonstrated improvement in functioning while patient has been engaged in psychotherapy/psychological service- if withdrawn the patient would deteriorate and/or relapse.     Collateral Reports Completed:   Not Applicable    PLAN: (Patient Tasks / Therapist Tasks / Other)    Patient reports that her goals for the next week include:    Take a long shower today  Try to go to bed early and get extra rest  Continue to attend TMS treatments (may be discontinuing women's group)        Reina Hoang   April 4, 2024                                                        ______________________________________________________________________    Individual Treatment Plan    Patient's Name: Randi Cleary  YOB: 1955    Date of Creation: 6/30/2020  Date Treatment Plan Last Reviewed/Revised: 3/15/2024    DSM5 Diagnoses: 296.89 Bipolar II Disorder Depressed, 300.02 (F41.1) Generalized Anxiety Disorder, or Adjustment Disorders  309.89 (F43.8) Other Specified Trauma and Stressor Related Disorder    Psychosocial / Contextual  "Factors: Patient's entire family of origin has , she now has a sister-in-law and  as support.  Relationship with  is conflictual. She is recovering from surgeries    PROMIS (reviewed every 90 days): PROMIS-10 Scores  PROMIS 10-Global Health (only subscores and total score):   PROMIS-10 Scores Only 2021 3/15/2022 3/15/2022 3/15/2022 3/24/2022 2022 2022   Global Mental Health Score 6 6 6 6 8 12 12   Global Physical Health Score 9 9 9 9 8 11 10   PROMIS TOTAL - SUBSCORES 15 15 15 15 16 23 22     Referral / Collaboration:  Referral to another professional/service is not indicated at this time. Pt is engaging in TMS.    Anticipated number of sessions for this episode of care: 50  Anticipated frequency of sessions: weekly or Biweekly  Anticipated duration of each session: 53 or more minutes due to intensity of trauma symptoms (continued)  Treatment plan will be reviewed in 90 days or when goals have been changed.     Measurable Treatment Goal(s) related to diagnosis / functional impairment(s)  Goal 1: Patient will \"jumpstart, getting going with the things I need to be doing around the house as far as picking up, doing things, trying to do something every day.  Also to lessen the animosity between me and my .\"    I will know I've met my goal when my shoulders are fixed and I can see.      Objective #A (Patient Action)    Patient will  increase frequency of engaging her in ADLs .  Status: Continued - Date(s): 12/10/21, 3/9/22, 22, 22, 22, 3/2/23, 23, 23, 23, 3/15/24    Intervention(s)  Therapist will  engage patient in CBT, specifically behavioral activation .    Objective #B  Patient will   track and record at least 5 pleasant exchanges with . Patient will be able to identify at least 5 positive traits about her  and how he relates to her .  Status: Continued - Date(s): 12/10/21, 3/9/22, 22, 22, 22, 3/2/23, 23, 23, " "12/14/23, 3/15/24    Intervention(s)  Therapist will  teach assertiveness skills and assign homework related to relationship interactions .    Objective #C  Patient will  reduce level of depressive and anxious features as evidenced by reduction in score on her CHAVO-7 and PHQ-9 (scores of 15 and 16 at first measurment, respectively) .  Status: Continued - Date(s): 12/10/21, 3/9/22, 6/9/22, 9/2/22, 12/1/22, 3/2/23, 6/6/23, 9/13/23, 12/14/23, 3/15/24    Intervention(s)  Therapist will  engage patient in person-centered therapy and CBT .    Patient has reviewed and agreed to the above plan.    Treatment plan was developed with previous provider and adopted by new therapist.      Reina Hoang  March 15, 2024                                                   Randi Cleary          SAFETY PLAN:  Step 1: Warning signs / cues (Thoughts, images, mood, situation, behavior) that a crisis may be developing:  Thoughts: \"I don't want to continue\" \"I am unwanted\"  Images: none  Thinking Processes: ruminating  Mood: anger  Behaviors: isolating/withdrawing , can't stop crying, not taking care of myself and not taking care of my responsibilities  Situations: small triggers, such as not being able to find something, or dropping something   Step 2: Coping strategies - Things I can do to take my mind off of my problems without contacting another person (relaxation technique, physical activity):  Distress Tolerance Strategies:  arts and crafts: drawing, play with my pet , listen to positive and upbeat music: any, change body temperature (ice pack/cold water)  and paced breathing/progressive muscle relaxation  Physical Activities: go for a walk, deep breathing and stretching   Focus on helpful thoughts:  \"You've been through this before, you can get through it again.\"  Step 3: People and social settings that provide distraction:                 Name: Carmen                            Name: Darien                           Name: Aleida"    pool, shopping, Carmen's house, [employer]       Step 4: Remind myself of people and things that are important to me and worth living for:  Clifford Little Donna, post-COVID world, options of what could be in your future        Step 5: When I am in crisis, I can ask these people to help me use my safety plan:                 Name: Sidney  Step 6: Making the environment safe:   go to sleep/daydream  Step 7: Professionals or agencies I can contact during a crisis:  [facility] Daytime Number: [phone]  Suicide Prevention Lifeline: 2-367-793-TALK 8255)  Crisis Text Line Service (available 24 hours a day, 7 days a week): Text [place] to 846014  Local Crisis Services: [facility] Crisis: [phone]  Adults can always access to the emPATH unit at [facility] (no phone number, utilize it like an urgent care or ER where you just show up)     Call 911 or go to my nearest emergency department.       I helped develop this safety plan and agree to use it when needed.  I have been given a copy of this plan.       Client signature _________________________________________________________________  Today s date:  11/24/2020  Adapted from Safety Plan Template 2008 Randi Poole and Robby Barba is reprinted with the express permission of the authors.  No portion of the Safety Plan Template may be reproduced without the express, written permission.  You can contact the authors at [email or web address] or [email or web address].

## 2024-04-01 NOTE — TELEPHONE ENCOUNTER
Left Voicemail (1st Attempt) for the patient to call back and schedule the following:    Appointment type: [facility]  Provider: SOPHIA  Return date: 04/02/24  Specialty phone number: [phone] OPT 1  Additional appointment(s) needed: N/A   Additonal Notes: N/A

## 2024-04-01 NOTE — PROGRESS NOTES
" Interventional Psychiatry Program  [address], [address]  [place] [address]  TMS Procedure Note   Randi Cleary MRN# [id number]  Age: 68 year old    YOB: 1955    Winnie Cleary comes into clinic today at the request of, Arnaldo Varela MD, ordering provider for TMS treatments.     Pre-Procedure:  History and Physical: Reviewed in medical record  Consent signed by: Randi Cleary for this course of treatment on: 3/6/2024      Clinical Narrative:  Patient tolerating treatment. Patient is still not getting good sleep last night. Ran her at her 110%.    Indications for TMS:  MDD, recurrent, severe; 4+ medication trials (from 2+ classes) ineffective; Psychotherapy ineffective     Procedure Diagnosis:  MDD, severe, recurrent, F33.2    Treatment Hx:  Treatment number this series: 18  Total lifetime treatment number: 18    Allergies   Allergen Reactions    Serotonin Reuptake Inhibitors (Ssris) Anxiety, Difficulty breathing, Headache, Palpitations and Shortness Of Breath    Buspirone      The patient states she had serotonin syndrome    Cephalexin      Other reaction(s): unknown rxn.    Desvenlafaxine      Serotonin syndrome    Diclofenac Sodium [Diclofenac]      Serotonin syndrome and restless legs syndrome    Gabapentin      Drove on the wrong side of the highway    Levofloxacin      \"CAN'T REMEMBER\"    Penicillins      \"SORES IN MOUTH\"    Riluzole Difficulty breathing and Swelling    Sulfa Antibiotics      \"PT DOES NOT KNOW WHAT THE REACTION WAS\"    Topiramate Other (See Comments)     Frequent urination      LMP  (LMP Unknown)     Pause for the Cause  Right patient: Yes  Right procedure/correct coil:  Yes; rTMS; [id number]; H1 coil.   Earplugs in place:  Yes    Procedure  Patient was seated in procedure chair. Identity and procedure was verified. Ear plugs were placed in ears and patient-specific cap was placed on head and tightened appropriately. Ruler locations were verified. Bone " conducting headphones were not used.  Coil was placed at 0 - eyebrow - 14 and stimulator was set to parameters below.  Initial train was well tolerated so 55 trains were delivered.  Patient tolerated procedure well with minimal right eyebrow movement.      Motor Threshold Determination  Distance from nasion to inion: 35.5cm  MT 1: 0 - 7 - 14 @ 61% on 03/06/2024    Standard LDLPFC  Frequency: 18  Train Duration: 2  Total pulses delivered: 1980  Inter-train interval: 20  Tx Loc: 0 - eyebrows - 14    Energy: 73% (120%MT)  Trains: 55      *Use spacer, legs raised*      Date MADRS QIDS PHQ-9   03/06/2024 33 18 21   3/15/2024  13 15   3/22/24  16 18   3/29/24  16 8                               3/28/2024     7:03 AM 3/29/2024    11:09 AM 4/1/2024     8:50 AM   PHQ-9 SCORE   PHQ-9 Total Score MyChart 16 (Moderately severe depression)  17 (Moderately severe depression)   PHQ-9 Total Score 16    16 8 17    17    17     Plan   - Continue TMS treatments    The service provided today was under the supervising provider of the day, Reva Hutchison MD, who was available if needed.     Vee Loja, TMS Technician  [facility] Neuromodulation

## 2024-04-02 ENCOUNTER — TELEPHONE (OUTPATIENT)
Dept: PSYCHIATRY | Facility: CLINIC | Age: 69
End: 2024-04-02

## 2024-04-02 ENCOUNTER — PATIENT OUTREACH (OUTPATIENT)
Dept: ONCOLOGY | Facility: CLINIC | Age: 69
End: 2024-04-02
Payer: MEDICARE

## 2024-04-02 NOTE — PROGRESS NOTES
[facility]: Cancer Care                                                                                          Pt sent my chart asking if she needs phlebotomy tomorrow.  Per parameters, writer responded that she did.  Pt questioning as she thought no phlebotomy unless ferritin over 125.  Parameters currently state over 50.  Discussed with Dr Pablo and she wants to keep ferritin slightly higher due to pts restless legs.  She updated therapy plan orders with parameter to hold phlebotomy if ferritin <125.  Pt no longer qualifies for phlebotomy tomorrow, message sent to scheduling to cancel infusion appt.  My chart message sent to pt updating her on order changes, plan and that infusion cancelled tomorrow.    Sowmya Hernandez, CORALN, RN  RN Care Coordinator  [facility]

## 2024-04-02 NOTE — TELEPHONE ENCOUNTER
Writer received phone call from patient today.  She was very tearful and appeared be panicking.  She reports that she continues to have difficulty with her sleep and did not get much restful sleep last night.  She was suppose to go to group this morning and then come to TMS this afternoon.  She states that she woke up too late and will not be able to make it to group and is wondering if she can also cancel TMS today as well.  She described all of her medical appointments and how difficult it has been to manage them.  She states that she thought she could do many things at the same time, but is struggling to maintain it all.      Writer encouraged her to call Kirstin's office and let them know that she will not be in group today.  We discussed leaving TMS on the schedule for today as her appointment is not until 230pm and she might feeling differently later on this afternoon.  She will call writer to follow up on this.

## 2024-04-03 ENCOUNTER — OFFICE VISIT (OUTPATIENT)
Dept: PSYCHIATRY | Facility: CLINIC | Age: 69
End: 2024-04-03
Payer: MEDICARE

## 2024-04-03 DIAGNOSIS — F33.2 SEVERE EPISODE OF RECURRENT MAJOR DEPRESSIVE DISORDER, WITHOUT PSYCHOTIC FEATURES (H): Primary | ICD-10-CM

## 2024-04-03 ASSESSMENT — PATIENT HEALTH QUESTIONNAIRE - PHQ9
SUM OF ALL RESPONSES TO PHQ QUESTIONS 1-9: 17
SUM OF ALL RESPONSES TO PHQ QUESTIONS 1-9: 17
10. IF YOU CHECKED OFF ANY PROBLEMS, HOW DIFFICULT HAVE THESE PROBLEMS MADE IT FOR YOU TO DO YOUR WORK, TAKE CARE OF THINGS AT HOME, OR GET ALONG WITH OTHER PEOPLE: VERY DIFFICULT
SUM OF ALL RESPONSES TO PHQ QUESTIONS 1-9: 17
10. IF YOU CHECKED OFF ANY PROBLEMS, HOW DIFFICULT HAVE THESE PROBLEMS MADE IT FOR YOU TO DO YOUR WORK, TAKE CARE OF THINGS AT HOME, OR GET ALONG WITH OTHER PEOPLE: VERY DIFFICULT
SUM OF ALL RESPONSES TO PHQ QUESTIONS 1-9: 17

## 2024-04-03 NOTE — PROGRESS NOTES
" Interventional Psychiatry Program  [address], [address]  [place] [address]  TMS Procedure Note   Randi Cleary MRN# [id number]  Age: 68 year old    YOB: 1955    Winnie Cleary comes into clinic today at the request of, Arnaldo Varela MD, ordering provider for TMS treatments.     Pre-Procedure:  History and Physical: Reviewed in medical record  Consent signed by: Randi Cleary for this course of treatment on: 3/6/2024      Clinical Narrative:  Patient tolerating treatment. Patient has had some bad reactions to one of her medications. Said it puts her in a state of not wanting to leave the house. Patient is not going to take medication anymore.    Indications for TMS:  MDD, recurrent, severe; 4+ medication trials (from 2+ classes) ineffective; Psychotherapy ineffective     Procedure Diagnosis:  MDD, severe, recurrent, F33.2    Treatment Hx:  Treatment number this series: 19  Total lifetime treatment number: 19    Allergies   Allergen Reactions    Serotonin Reuptake Inhibitors (Ssris) Anxiety, Difficulty breathing, Headache, Palpitations and Shortness Of Breath    Buspirone      The patient states she had serotonin syndrome    Cephalexin      Other reaction(s): unknown rxn.    Desvenlafaxine      Serotonin syndrome    Diclofenac Sodium [Diclofenac]      Serotonin syndrome and restless legs syndrome    Gabapentin      Drove on the wrong side of the highway    Levofloxacin      \"CAN'T REMEMBER\"    Penicillins      \"SORES IN MOUTH\"    Riluzole Difficulty breathing and Swelling    Sulfa Antibiotics      \"PT DOES NOT KNOW WHAT THE REACTION WAS\"    Topiramate Other (See Comments)     Frequent urination      LMP  (LMP Unknown)     Pause for the Cause  Right patient: Yes  Right procedure/correct coil:  Yes; rTMS; [id number]; H1 coil.   Earplugs in place:  Yes    Procedure  Patient was seated in procedure chair. Identity and procedure was verified. Ear plugs were placed in ears and " patient-specific cap was placed on head and tightened appropriately. Ruler locations were verified. Bone conducting headphones were not used.  Coil was placed at 0 - eyebrow - 14 and stimulator was set to parameters below.  Initial train was well tolerated so 55 trains were delivered.  Patient tolerated procedure well with minimal right eyebrow movement.      Motor Threshold Determination  Distance from nasion to inion: 35.5cm  MT 1: 0 - 7 - 14 @ 61% on 03/06/2024    Standard LDLPFC  Frequency: 18  Train Duration: 2  Total pulses delivered: 1980  Inter-train interval: 20  Tx Loc: 0 - eyebrows - 14    Energy: 73% (120%MT)  Trains: 55      *Use spacer, legs raised*      Date MADRS QIDS PHQ-9   03/06/2024 33 18 21   3/15/2024  13 15   3/22/24  16 18   3/29/24  16 8                               3/29/2024    11:09 AM 4/1/2024     8:50 AM 4/3/2024     8:47 AM   PHQ-9 SCORE   PHQ-9 Total Score MyChart  17 (Moderately severe depression) 17 (Moderately severe depression)   PHQ-9 Total Score 8 17    17    17 17    17     Plan   - Continue TMS treatments    The service provided today was under the supervising provider of the day, Reva Hutchison MD, who was available if needed.     Vee Loja, TMS Technician  [facility]  Mental [facility] Neuromodulation

## 2024-04-04 ENCOUNTER — OFFICE VISIT (OUTPATIENT)
Dept: PSYCHIATRY | Facility: CLINIC | Age: 69
End: 2024-04-04
Payer: MEDICARE

## 2024-04-04 ENCOUNTER — MYC MEDICAL ADVICE (OUTPATIENT)
Dept: INTERNAL MEDICINE | Facility: CLINIC | Age: 69
End: 2024-04-04
Payer: MEDICARE

## 2024-04-04 ENCOUNTER — VIRTUAL VISIT (OUTPATIENT)
Dept: PSYCHOLOGY | Facility: CLINIC | Age: 69
End: 2024-04-04
Payer: MEDICARE

## 2024-04-04 DIAGNOSIS — F33.2 SEVERE EPISODE OF RECURRENT MAJOR DEPRESSIVE DISORDER, WITHOUT PSYCHOTIC FEATURES (H): Primary | ICD-10-CM

## 2024-04-04 DIAGNOSIS — F31.81 BIPOLAR 2 DISORDER (H): Primary | ICD-10-CM

## 2024-04-04 DIAGNOSIS — F43.9 TRAUMA AND STRESSOR-RELATED DISORDER: ICD-10-CM

## 2024-04-04 DIAGNOSIS — F41.1 GENERALIZED ANXIETY DISORDER: ICD-10-CM

## 2024-04-04 PROCEDURE — 90837 PSYTX W PT 60 MINUTES: CPT | Mod: 95 | Performed by: MARRIAGE & FAMILY THERAPIST

## 2024-04-04 ASSESSMENT — ANXIETY QUESTIONNAIRES
7. FEELING AFRAID AS IF SOMETHING AWFUL MIGHT HAPPEN: MORE THAN HALF THE DAYS
2. NOT BEING ABLE TO STOP OR CONTROL WORRYING: MORE THAN HALF THE DAYS
2. NOT BEING ABLE TO STOP OR CONTROL WORRYING: MORE THAN HALF THE DAYS
5. BEING SO RESTLESS THAT IT IS HARD TO SIT STILL: SEVERAL DAYS
8. IF YOU CHECKED OFF ANY PROBLEMS, HOW DIFFICULT HAVE THESE MADE IT FOR YOU TO DO YOUR WORK, TAKE CARE OF THINGS AT HOME, OR GET ALONG WITH OTHER PEOPLE?: VERY DIFFICULT
IF YOU CHECKED OFF ANY PROBLEMS ON THIS QUESTIONNAIRE, HOW DIFFICULT HAVE THESE PROBLEMS MADE IT FOR YOU TO DO YOUR WORK, TAKE CARE OF THINGS AT HOME, OR GET ALONG WITH OTHER PEOPLE: VERY DIFFICULT
8. IF YOU CHECKED OFF ANY PROBLEMS, HOW DIFFICULT HAVE THESE MADE IT FOR YOU TO DO YOUR WORK, TAKE CARE OF THINGS AT HOME, OR GET ALONG WITH OTHER PEOPLE?: VERY DIFFICULT
GAD7 TOTAL SCORE: 13
4. TROUBLE RELAXING: MORE THAN HALF THE DAYS
1. FEELING NERVOUS, ANXIOUS, OR ON EDGE: MORE THAN HALF THE DAYS
GAD7 TOTAL SCORE: 13
7. FEELING AFRAID AS IF SOMETHING AWFUL MIGHT HAPPEN: MORE THAN HALF THE DAYS
GAD7 TOTAL SCORE: 13
8. IF YOU CHECKED OFF ANY PROBLEMS, HOW DIFFICULT HAVE THESE MADE IT FOR YOU TO DO YOUR WORK, TAKE CARE OF THINGS AT HOME, OR GET ALONG WITH OTHER PEOPLE?: VERY DIFFICULT
4. TROUBLE RELAXING: MORE THAN HALF THE DAYS
GAD7 TOTAL SCORE: 13
IF YOU CHECKED OFF ANY PROBLEMS ON THIS QUESTIONNAIRE, HOW DIFFICULT HAVE THESE PROBLEMS MADE IT FOR YOU TO DO YOUR WORK, TAKE CARE OF THINGS AT HOME, OR GET ALONG WITH OTHER PEOPLE: VERY DIFFICULT
1. FEELING NERVOUS, ANXIOUS, OR ON EDGE: MORE THAN HALF THE DAYS
3. WORRYING TOO MUCH ABOUT DIFFERENT THINGS: SEVERAL DAYS
8. IF YOU CHECKED OFF ANY PROBLEMS, HOW DIFFICULT HAVE THESE MADE IT FOR YOU TO DO YOUR WORK, TAKE CARE OF THINGS AT HOME, OR GET ALONG WITH OTHER PEOPLE?: VERY DIFFICULT
GAD7 TOTAL SCORE: 13
GAD7 TOTAL SCORE: 13
2. NOT BEING ABLE TO STOP OR CONTROL WORRYING: MORE THAN HALF THE DAYS
GAD7 TOTAL SCORE: 13
5. BEING SO RESTLESS THAT IT IS HARD TO SIT STILL: SEVERAL DAYS
3. WORRYING TOO MUCH ABOUT DIFFERENT THINGS: SEVERAL DAYS
GAD7 TOTAL SCORE: 13
6. BECOMING EASILY ANNOYED OR IRRITABLE: NEARLY EVERY DAY
7. FEELING AFRAID AS IF SOMETHING AWFUL MIGHT HAPPEN: MORE THAN HALF THE DAYS
2. NOT BEING ABLE TO STOP OR CONTROL WORRYING: MORE THAN HALF THE DAYS
3. WORRYING TOO MUCH ABOUT DIFFERENT THINGS: SEVERAL DAYS
1. FEELING NERVOUS, ANXIOUS, OR ON EDGE: MORE THAN HALF THE DAYS
7. FEELING AFRAID AS IF SOMETHING AWFUL MIGHT HAPPEN: MORE THAN HALF THE DAYS
4. TROUBLE RELAXING: MORE THAN HALF THE DAYS
1. FEELING NERVOUS, ANXIOUS, OR ON EDGE: MORE THAN HALF THE DAYS
4. TROUBLE RELAXING: MORE THAN HALF THE DAYS
7. FEELING AFRAID AS IF SOMETHING AWFUL MIGHT HAPPEN: MORE THAN HALF THE DAYS
6. BECOMING EASILY ANNOYED OR IRRITABLE: NEARLY EVERY DAY
GAD7 TOTAL SCORE: 13
3. WORRYING TOO MUCH ABOUT DIFFERENT THINGS: SEVERAL DAYS
5. BEING SO RESTLESS THAT IT IS HARD TO SIT STILL: SEVERAL DAYS
GAD7 TOTAL SCORE: 13
GAD7 TOTAL SCORE: 13
7. FEELING AFRAID AS IF SOMETHING AWFUL MIGHT HAPPEN: MORE THAN HALF THE DAYS
IF YOU CHECKED OFF ANY PROBLEMS ON THIS QUESTIONNAIRE, HOW DIFFICULT HAVE THESE PROBLEMS MADE IT FOR YOU TO DO YOUR WORK, TAKE CARE OF THINGS AT HOME, OR GET ALONG WITH OTHER PEOPLE: VERY DIFFICULT
6. BECOMING EASILY ANNOYED OR IRRITABLE: NEARLY EVERY DAY
7. FEELING AFRAID AS IF SOMETHING AWFUL MIGHT HAPPEN: MORE THAN HALF THE DAYS
7. FEELING AFRAID AS IF SOMETHING AWFUL MIGHT HAPPEN: MORE THAN HALF THE DAYS
5. BEING SO RESTLESS THAT IT IS HARD TO SIT STILL: SEVERAL DAYS
6. BECOMING EASILY ANNOYED OR IRRITABLE: NEARLY EVERY DAY
IF YOU CHECKED OFF ANY PROBLEMS ON THIS QUESTIONNAIRE, HOW DIFFICULT HAVE THESE PROBLEMS MADE IT FOR YOU TO DO YOUR WORK, TAKE CARE OF THINGS AT HOME, OR GET ALONG WITH OTHER PEOPLE: VERY DIFFICULT

## 2024-04-04 NOTE — PROGRESS NOTES
" Interventional Psychiatry Program  [address], [address]  [place] [address]  TMS Procedure Note   Randi Cleary MRN# [id number]  Age: 68 year old    YOB: 1955    Winnie Cleary comes into clinic today at the request of, Arnaldo Varela MD, ordering provider for TMS treatments.     Pre-Procedure:  History and Physical: Reviewed in medical record  Consent signed by: Randi Cleary for this course of treatment on: 3/6/2024      Clinical Narrative:  Patient tolerating treatment. Patient is getting 3-4 hours of sleep per night. Ran her at her 100% today.    Indications for TMS:  MDD, recurrent, severe; 4+ medication trials (from 2+ classes) ineffective; Psychotherapy ineffective     Procedure Diagnosis:  MDD, severe, recurrent, F33.2    Treatment Hx:  Treatment number this series: 20  Total lifetime treatment number: 20    Allergies   Allergen Reactions    Serotonin Reuptake Inhibitors (Ssris) Anxiety, Difficulty breathing, Headache, Palpitations and Shortness Of Breath    Buspirone      The patient states she had serotonin syndrome    Cephalexin      Other reaction(s): unknown rxn.    Desvenlafaxine      Serotonin syndrome    Diclofenac Sodium [Diclofenac]      Serotonin syndrome and restless legs syndrome    Gabapentin      Drove on the wrong side of the highway    Levofloxacin      \"CAN'T REMEMBER\"    Penicillins      \"SORES IN MOUTH\"    Riluzole Difficulty breathing and Swelling    Sulfa Antibiotics      \"PT DOES NOT KNOW WHAT THE REACTION WAS\"    Topiramate Other (See Comments)     Frequent urination      LMP  (LMP Unknown)     Pause for the Cause  Right patient: Yes  Right procedure/correct coil:  Yes; rTMS; [id number]; H1 coil.   Earplugs in place:  Yes    Procedure  Patient was seated in procedure chair. Identity and procedure was verified. Ear plugs were placed in ears and patient-specific cap was placed on head and tightened appropriately. Ruler locations were verified. Bone " conducting headphones were not used.  Coil was placed at 0 - eyebrow - 14 and stimulator was set to parameters below.  Initial train was well tolerated so 55 trains were delivered.  Patient tolerated procedure well with minimal right eyebrow movement.      Motor Threshold Determination  Distance from nasion to inion: 35.5cm  MT 1: 0 - 7 - 14 @ 61% on 03/06/2024    Standard LDLPFC  Frequency: 18  Train Duration: 2  Total pulses delivered: 1980  Inter-train interval: 20  Tx Loc: 0 - eyebrows - 14    Energy: 73% (120%MT)  Trains: 55      *Use spacer, legs raised*      Date MADRS QIDS PHQ-9   03/06/2024 33 18 21   3/15/2024  13 15   3/22/24  16 18   3/29/24  16 8                               3/29/2024    11:09 AM 4/1/2024     8:50 AM 4/3/2024     8:47 AM   PHQ-9 SCORE   PHQ-9 Total Score MyChart  17 (Moderately severe depression) 17 (Moderately severe depression)   PHQ-9 Total Score 8 17    17    17 17    17     Plan   - Continue TMS treatments    The service provided today was under the supervising provider of the day, DHRUV Warren MD, who was available if needed.     Vee Ljoa, TMS Technician  [facility]

## 2024-04-04 NOTE — PATIENT INSTRUCTIONS
Patient reports that her goals for the next week include:    Take a long shower today  Try to go to bed early and get extra rest  Continue to attend TMS treatments (may be discontinuing women's group)

## 2024-04-05 ENCOUNTER — OFFICE VISIT (OUTPATIENT)
Dept: PSYCHIATRY | Facility: CLINIC | Age: 69
End: 2024-04-05
Payer: MEDICARE

## 2024-04-05 ENCOUNTER — TELEPHONE (OUTPATIENT)
Dept: SLEEP MEDICINE | Facility: CLINIC | Age: 69
End: 2024-04-05
Payer: MEDICARE

## 2024-04-05 ENCOUNTER — PATIENT OUTREACH (OUTPATIENT)
Dept: CARE COORDINATION | Facility: CLINIC | Age: 69
End: 2024-04-05
Payer: MEDICARE

## 2024-04-05 DIAGNOSIS — F33.2 SEVERE EPISODE OF RECURRENT MAJOR DEPRESSIVE DISORDER, WITHOUT PSYCHOTIC FEATURES (H): Primary | ICD-10-CM

## 2024-04-05 DIAGNOSIS — F51.01 PRIMARY INSOMNIA: Primary | ICD-10-CM

## 2024-04-05 RX ORDER — ZOLPIDEM TARTRATE 5 MG/1
5 TABLET ORAL
Qty: 20 TABLET | Refills: 0 | Status: ON HOLD | OUTPATIENT
Start: 2024-04-05 | End: 2024-06-20

## 2024-04-05 NOTE — TELEPHONE ENCOUNTER
General Call      Reason for Call:  Patient is calling to see if a Rx for zolpidem (AMBIEN) 5 MG tablet to be called into [facility] in [place]. Patient hasn't been able to get any sleep and last night she broke down and took the Ambien that she got from Dr. Gregory to use before her Sleep Study test on 06/16/24 and finally was able to get sleep. So patient is requesting for a Rx of the medication to help her sleep until she can do her sleep study test. Patient is also going thru TMS therapy which also can cause sleepless nights and she talked with them and they told her to contact her provider to see if she can get more to help her sleep.    Date of last appointment with provider: 02/07/24 Dr. Gregory    Could we send this information to you in [facility] or would you prefer to receive a phone call?:   Patient would prefer a phone call   Okay to leave a detailed message?: Yes at Cell number on file:    Telephone Information:   Mobile [phone]     Candace Kim   [facility]  Central Scheduler

## 2024-04-05 NOTE — PROGRESS NOTES
" Interventional Psychiatry Program  [address], [address]  [place] [address]  TMS Procedure Note   Randi Cleary MRN# [id number]  Age: 68 year old    YOB: 1955    Winnie Cleary comes into clinic today at the request of, Arnaldo Varela MD, ordering provider for TMS treatments.     Pre-Procedure:  History and Physical: Reviewed in medical record  Consent signed by: Randi Cleary for this course of treatment on: 3/6/2024      Clinical Narrative:  Patient tolerating treatment. Patient got more sleep last night, bumped her back up to her 115%.    Indications for TMS:  MDD, recurrent, severe; 4+ medication trials (from 2+ classes) ineffective; Psychotherapy ineffective     Procedure Diagnosis:  MDD, severe, recurrent, F33.2    Treatment Hx:  Treatment number this series: 21  Total lifetime treatment number: 21    Allergies   Allergen Reactions    Serotonin Reuptake Inhibitors (Ssris) Anxiety, Difficulty breathing, Headache, Palpitations and Shortness Of Breath    Buspirone      The patient states she had serotonin syndrome    Cephalexin      Other reaction(s): unknown rxn.    Desvenlafaxine      Serotonin syndrome    Diclofenac Sodium [Diclofenac]      Serotonin syndrome and restless legs syndrome    Gabapentin      Drove on the wrong side of the highway    Levofloxacin      \"CAN'T REMEMBER\"    Penicillins      \"SORES IN MOUTH\"    Riluzole Difficulty breathing and Swelling    Sulfa Antibiotics      \"PT DOES NOT KNOW WHAT THE REACTION WAS\"    Topiramate Other (See Comments)     Frequent urination      LMP  (LMP Unknown)     Pause for the Cause  Right patient: Yes  Right procedure/correct coil:  Yes; rTMS; [id number]; H1 coil.   Earplugs in place:  Yes    Procedure  Patient was seated in procedure chair. Identity and procedure was verified. Ear plugs were placed in ears and patient-specific cap was placed on head and tightened appropriately. Ruler locations were verified. Bone " conducting headphones were not used.  Coil was placed at 0 - eyebrow - 14 and stimulator was set to parameters below.  Initial train was well tolerated so 55 trains were delivered.  Patient tolerated procedure well with minimal right eyebrow movement.      Motor Threshold Determination  Distance from nasion to inion: 35.5cm  MT 1: 0 - 7 - 14 @ 61% on 03/06/2024    Standard LDLPFC  Frequency: 18  Train Duration: 2  Total pulses delivered: 1980  Inter-train interval: 20  Tx Loc: 0 - eyebrows - 14    Energy: 73% (120%MT)  Trains: 55      *Use spacer, legs raised*      Date MADRS QIDS PHQ-9   03/06/2024 33 18 21   3/15/2024  13 15   3/22/24  16 18   3/29/24  16 8   4/5/24  18 11                         4/3/2024     8:47 AM 4/5/2024     9:04 AM 4/5/2024    10:41 AM   PHQ-9 SCORE   PHQ-9 Total Score MyChart 17 (Moderately severe depression) 17 (Moderately severe depression)    PHQ-9 Total Score 17    17 17 11     Plan   - Continue TMS treatments    The service provided today was under the supervising provider of the day, Reva Hutchison MD, who was available if needed.     Vee Loja, TMS Technician  [facility]

## 2024-04-05 NOTE — TELEPHONE ENCOUNTER
Patient calling back to see if Dr. Constantino would consider ordering Ambien for sleep. She stated she needs to get a good night's sleep in order for her TMS treatments to work effectively. She said she took an Ambien she was given by the Sleep Clinic provider for her sleep study. and reported she had a good night's sleep. Writer asked if her psychiatric provider, Dr. Varela would be willing to prescribe the Ambien for her, and said appeared to get upset by this question. Patient stated she has tried Melatonin, warm milk, marijuana for sleep, but she said none of these things help her stay asleep. Patient reported she is using good sleep hygiene. Please advise.     Thanks,     Dave Myhre, RN   CCC RN

## 2024-04-05 NOTE — PROGRESS NOTES
Follow Up Progress Note      Assessment: CCC RN spoke with patient today per her request. Patient stated her  TCM (Transcranial Magnetic Stimulation Treatments) were going well. She stated she is starting to see a little improvement with regards to her mental health. Patient by her psychiatrist a good night's sleep was essential for these treatments to work. She stated she took a dose of Ambien ordered for her sleep study prior to her most recent TCM, and reported the Ambien allowed her to get a good night's sleep. Patient requested and order for Ambien for her PCP until she has completed her treatments. Writer will forward request to PCP. No other needs or concerns at this time.   Care Gaps:    Health Maintenance Due   Topic Date Due    HF ACTION PLAN  Never done    DIABETIC FOOT EXAM  Never done    ADVANCE CARE PLANNING  Never done    COPD ACTION PLAN  Never done    ZOSTER IMMUNIZATION (1 of 2) Never done    MEDICARE ANNUAL WELLNESS VISIT  Never done    EYE EXAM  12/07/2021    MAMMO SCREENING  08/15/2023    COLORECTAL CANCER SCREENING  02/25/2024       Patient accepted scheduling phone number for PCP  to schedule independently     Care Plans  Care Plan: Mental Health       Problem: Mental Health Symptoms Need Improvement       Goal: I would like to feel as though my mental health has improved.       Start Date: 9/25/2023 Expected End Date: 9/24/2024    Recent Progress: 10%    Priority: High    Note:     Barriers: history of anxiety, bipolar disorder, trauma related disorder  Strengths: strong advocate for herself  Patient expressed understanding of goal: yes  Action steps to achieve this goal:  1. I will continue to attend all appointments with my therapist Mary Kay Gomez and with my new psychiatric provide.   2. I will continue to attend all appointments with the [facility] partial-hospitalization program.   3. I will continue to the use the skills I have learned through therapy and the partial hospitalization program.   4.  I will take my medications as directed.   5. I will report progress towards this goal at schedule outreach telephone calls from my Care Coordinator.   Disucssed on 4/5/24                              Intervention/Education provided during outreach: Discussed the importance of taking her medications as directed. Encouraged her to contact Care Coordination for any additional needs or concerns. Encouraged her to contact Care Coordination for any additional needs or concerns.      Outreach Frequency: monthly, more frequently as needed      Plan: CCC RN will continue to monitor, support patient with current goal and will be available to assist as nursing needs arise.   Care Coordinator will follow up in one month.
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
## 2024-04-08 ENCOUNTER — VIRTUAL VISIT (OUTPATIENT)
Dept: PSYCHIATRY | Facility: CLINIC | Age: 69
End: 2024-04-08
Payer: MEDICARE

## 2024-04-08 ENCOUNTER — OFFICE VISIT (OUTPATIENT)
Dept: PSYCHIATRY | Facility: CLINIC | Age: 69
End: 2024-04-08
Payer: MEDICARE

## 2024-04-08 DIAGNOSIS — F43.9 TRAUMA AND STRESSOR-RELATED DISORDER: ICD-10-CM

## 2024-04-08 DIAGNOSIS — F33.2 SEVERE EPISODE OF RECURRENT MAJOR DEPRESSIVE DISORDER, WITHOUT PSYCHOTIC FEATURES (H): ICD-10-CM

## 2024-04-08 DIAGNOSIS — F60.3 BORDERLINE PERSONALITY DISORDER (H): Primary | ICD-10-CM

## 2024-04-08 DIAGNOSIS — F33.2 SEVERE EPISODE OF RECURRENT MAJOR DEPRESSIVE DISORDER, WITHOUT PSYCHOTIC FEATURES (H): Primary | ICD-10-CM

## 2024-04-08 PROCEDURE — 99213 OFFICE O/P EST LOW 20 MIN: CPT | Mod: 95

## 2024-04-08 PROCEDURE — G2211 COMPLEX E/M VISIT ADD ON: HCPCS | Mod: 95

## 2024-04-08 PROCEDURE — 90833 PSYTX W PT W E/M 30 MIN: CPT | Mod: 95

## 2024-04-08 ASSESSMENT — PATIENT HEALTH QUESTIONNAIRE - PHQ9
SUM OF ALL RESPONSES TO PHQ QUESTIONS 1-9: 14
SUM OF ALL RESPONSES TO PHQ QUESTIONS 1-9: 14
10. IF YOU CHECKED OFF ANY PROBLEMS, HOW DIFFICULT HAVE THESE PROBLEMS MADE IT FOR YOU TO DO YOUR WORK, TAKE CARE OF THINGS AT HOME, OR GET ALONG WITH OTHER PEOPLE: VERY DIFFICULT
SUM OF ALL RESPONSES TO PHQ QUESTIONS 1-9: 14
SUM OF ALL RESPONSES TO PHQ QUESTIONS 1-9: 17
10. IF YOU CHECKED OFF ANY PROBLEMS, HOW DIFFICULT HAVE THESE PROBLEMS MADE IT FOR YOU TO DO YOUR WORK, TAKE CARE OF THINGS AT HOME, OR GET ALONG WITH OTHER PEOPLE: VERY DIFFICULT
SUM OF ALL RESPONSES TO PHQ QUESTIONS 1-9: 17

## 2024-04-08 ASSESSMENT — ANXIETY QUESTIONNAIRES: GAD7 TOTAL SCORE: 13

## 2024-04-08 NOTE — NURSING NOTE
Is the patient currently in the state of [place]? YES    Visit mode:VIDEO    If the visit is dropped, the patient can be reconnected by: VIDEO VISIT: Text to cell phone:   Telephone Information:   Mobile [phone]       Will anyone else be joining the visit? NO  (If patient encounters technical issues they should call [phone]    How would you like to obtain your AVS? MyChart    Are changes needed to the allergy or medication list? No    Are refills needed on medications prescribed by this physician? NO    Reason for visit: RECHECK    Sarika KURTZ
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
## 2024-04-08 NOTE — PROGRESS NOTES
Virtual Visit Details    Type of service:  Video Visit   Video Start Time: 1:04p  Video End Time:1:42p    Originating Location (pt. Location): Home    Distant Location (provider location):  On-site  Platform used for Video Visit: [employer]  Answers submitted by the patient for this visit:  Patient Health Questionnaire (Submitted on 4/8/2024)  If you checked off any problems, how difficult have these problems made it for you to do your work, take care of things at home, or get along with other people?: Very difficult  PHQ9 TOTAL SCORE: 17  CHAVO-7 (Submitted on 4/8/2024)  CHAVO 7 TOTAL SCORE: 13

## 2024-04-08 NOTE — PATIENT INSTRUCTIONS
Medication Plan:  -Deferred until completion of TMS    **For crisis resources, please see the information at the end of this document**   Patient Education    Thank you for coming to the [facility] [place] [facility].     Lab Testing:  If you had lab testing today and your results are reassuring or normal they will be mailed to you or sent through [employer] within 7 days. If the lab tests need quick action we will call you with the results. The phone number we will call with results is # [phone]. If this is not the best number please call our clinic and change the number.     Medication Refills:  If you need any refills please call your pharmacy and they will contact us. Our fax number for refills is [phone].   Three business days of notice are needed for general medication refill requests.   Five business days of notice are needed for controlled substance refill requests.   If you need to change to a different pharmacy, please contact the new pharmacy directly. The new pharmacy will help you get your medications transferred.     Contact Us:  Please call [phone] during business hours (8-5:00 M-F).   If you have medication related questions after clinic hours, or on the weekend, please call [phone].     Financial Assistance [phone]   Medical Records [phone]       MENTAL HEALTH CRISIS RESOURCES:  For a emergency help, please call [phone] or go to the nearest Emergency Department.     Emergency Walk-In Options:   EmPATH Unit @ [facility] ([place]): [phone] - Specialized mental health emergency area designed to be calming  [facility] ([place]): [phone]  [facility] ([place]): [phone]  [facility]): [phone]    [facility] Crisis Information:   [place]: [phone]  [place]: [phone]  [place] (NIN[place]) - Adult: [phone]     Child: [phone]  [place] - Adult: [phone]      [facility]: [phone]  [place]: [phone]  List of all [facility] resources:   https://[place].gov/dhs/people-we-serve/adults/health-care/mental-health/resources/crisis-contacts.jsp    National Crisis Information:   Crisis Text Line: Text  [place]  to 076125  Suicide & Crisis Lifeline: 988  National Suicide Prevention Lifeline: 9-237-307-TALK (1-794.398.2120)       For online chat options, visit https://suicidepreventionlifeline.org/chat/  Poison Control Center: 3-859-857-8554  Trans Lifeline: 1-350.360.9062 - Hotline for transgender people of all ages  [employer]: 5-890-366-0446 - Hotline for LGBT youth     For Non-Emergency Support:   Fast Tracker: Mental Health & Substance Use Disorder Resources -   https://www.[employer]trackCortican.org/

## 2024-04-08 NOTE — PROGRESS NOTES
[facility]                                     Progress Note    Patient Name: Randi Cleary  Date: 4/10/2024         Service Type: Individual     Session Start Time: 2:01 p.m. Session End Time: 2:57 p.m.     Session Length: 56 minutes    Session #: 2 with this provider (patient is transferring due to previous provider's leave)    Attendees: Client attended alone    Service Modality:  Video Visit:      Provider verified identity through the following two-step process.  Patient provided:  Patient is known previously to provider and Patient was verified at admission/transfer    Telemedicine Visit: The patient's condition can be safely assessed and treated via synchronous audio and visual telemedicine encounter.      Reason for Telemedicine Visit: Patient convenience (e.g. access to timely appointments / distance to available provider)    Originating Site (Patient Location): Patient's home    Site (Provider Location): [facility]: [place]    Consent:  The patient/guardian has verbally consented to: the potential risks and benefits of telemedicine (video visit) versus in person care; bill my insurance or make self-payment for services provided; and responsibility for payment of non-covered services.     Patient would like the video invitation sent by:  My Chart    Mode of Communication:  Video Conference via [employer]    Location (Provider):  On-site    As the provider I attest to compliance with applicable laws and regulations related to telemedicine.      DATA  Extended Session (53+ minutes): PROLONGED SERVICE IN THE OUTPATIENT SETTING REQUIRING DIRECT (FACE-TO-FACE) PATIENT CONTACT BEYOND THE USUAL SERVICE:    - Longer session due to limited access to mental health appointments and necessity to address patient's distress / complexity    - Treatment protocol required additional time to complete, due to the nature of diagnosis being treated.  See Interventions section for  details  Interactive Complexity: No  Crisis: No      Progress Since Last Session (Related to Symptoms / Goals / Homework):   Symptoms:  Patient reports feeling more tired than usual (continued) and has been having less-intense TMS treatments due to her lack of sleep; discussed barriers to exercise and healthy eating.    Homework:  Progress made       Episode of Care Goals: Satisfactory progress - ACTION (Actively working towards change); Intervened by reinforcing change plan / affirming steps taken     Current / Ongoing Stressors and Concerns:   Patient is currently socially isolated. She has a conflictual relationship with her .  She is getting minimal physical activity.  She has had several surgeries. Patient is currently completing TMS treatment.     Treatment Objective(s) Addressed in This Session:   Patient will increase frequency of engaging her in ADLs  Identify barriers to exercise and healthy eating; create plan to address and overcome barriers  Processed some family system issues and history of grief, loss, and trauma  Patient will track and record at least 5 pleasant exchanges with . Patient will be able to identify at least 5 positive traits about her .  Patient will reduce level of depressive and anxious features as evidenced by reduction in score on her CHAVO-7 and PHQ-9 (scores of 15 and 16 at first measurment, respectively).     Intervention:  Building rapport  Family systems dynamics and patterns  Narrative therapy:  DBT/CBT    The following assessments were completed by patient for this visit:    PHQ9:       4/3/2024     8:47 AM 4/5/2024     9:04 AM 4/5/2024    10:41 AM 4/8/2024     8:52 AM 4/8/2024    11:14 AM 4/9/2024     8:18 AM 4/10/2024    10:39 AM   PHQ-9 SCORE   PHQ-9 Total Score MyChart 17 (Moderately severe depression) 17 (Moderately severe depression)  14 (Moderate depression) 17 (Moderately severe depression) 13 (Moderate depression)    PHQ-9 Total Score 17    17 17 11  14    14 17 13    13 13     GAD7:       10/31/2023     4:25 AM 12/1/2023    11:37 AM 12/18/2023     3:09 PM 1/15/2024    12:02 PM 1/29/2024     3:10 AM 3/5/2024     3:08 PM 4/4/2024     8:52 AM   CHAVO-7 SCORE   Total Score 15 (severe anxiety) 14 (moderate anxiety) 16 (severe anxiety) 14 (moderate anxiety) 16 (severe anxiety) 13 (moderate anxiety) 13 (moderate anxiety)   Total Score 15 14 16 14 16 13 13    13    13    13     PROMIS 10-Global Health (only subscores and total score):       7/21/2023    10:36 AM 7/28/2023     3:24 PM 8/7/2023     1:07 PM 9/20/2023    10:13 AM 10/31/2023     4:36 AM 12/1/2023    11:52 AM 1/22/2024    12:00 PM   PROMIS-10 Scores Only   Global Mental Health Score 5    5 5 6    6 5    5    5 6 5 5   Global Physical Health Score 7    7 7 8    8 9    9    9 8 8 9   PROMIS TOTAL - SUBSCORES 12    12 12 14    14 14    14    14 14 13 14      ASSESSMENT: Current Emotional / Mental Status (status of significant symptoms):   Risk status (Self / Other harm or suicidal ideation)   Patient denies current fears or concerns for personal safety.   Patient denies current or recent suicidal ideation or behaviors. Has hx of SI.   Patient denies current or recent homicidal ideation or behaviors.   Patient denies current or recent self injurious behavior or ideation.   Patient denies other safety concerns.   Patient reports there has been no change in risk factors since their last session.     Patient reports there has been no change in protective factors since their last session.     A safety and risk management plan has been developed including: Patient consented to co-developed safety plan on 11/24/2020, updated 2/20/23.  Safety and risk management plan was reviewed.   Patient agreed to use safety plan should any safety concerns arise.  A copy was made available to the patient.     Appearance:   Appropriate    Eye Contact:   Good    Psychomotor Behavior: Normal    Attitude:   Cooperative     Orientation:   All   Speech    Rate / Production: Normal/ Responsive    Volume:  Normal    Mood:    Anxious  Sad    Affect:    Appropriate  Tearful   Thought Content:  Clear  Rumination    Thought Form:  Coherent  Flight of Ideas    Insight:    Good      Medication Review:   No changes to current psychiatric medication(s)      Medication Compliance:   Yes     Changes in Health Issues:   Problems with new specialty shoe inserts/shoes     Chemical Use Review:   Substance Use: Chemical use reviewed, no active concerns identified ; Nothing used since 2021.     Tobacco Use: No current tobacco use.      Diagnosis:  1. Bipolar 2 disorder (H)    2. Trauma and stressor-related disorder    3. Generalized anxiety disorder      Borderline personality disorder vs. Bipolar 2    Note: There has been demonstrated improvement in functioning while patient has been engaged in psychotherapy/psychological service- if withdrawn the patient would deteriorate and/or relapse.     Collateral Reports Completed:   Not Applicable    PLAN: (Patient Tasks / Therapist Tasks / Other)    Patient reports that her goals for the next week include:    Get to the pool  Get fitted for shoes        Reina Hoang   April 10, 2024                                                        ______________________________________________________________________    Individual Treatment Plan    Patient's Name: Randi Cleary  YOB: 1955    Date of Creation: 2020  Date Treatment Plan Last Reviewed/Revised: 3/15/2024    DSM5 Diagnoses: 296.89 Bipolar II Disorder Depressed, 300.02 (F41.1) Generalized Anxiety Disorder, or Adjustment Disorders  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  Relationship with  is conflictual. She is recovering from surgeries    PROMIS  "(reviewed every 90 days): PROMIS-10 Scores  PROMIS 10-Global Health (only subscores and total score):   PROMIS-10 Scores Only 12/14/2021 3/15/2022 3/15/2022 3/15/2022 3/24/2022 4/1/2022 6/9/2022   Global Mental Health Score 6 6 6 6 8 12 12   Global Physical Health Score 9 9 9 9 8 11 10   PROMIS TOTAL - SUBSCORES 15 15 15 15 16 23 22     Referral / Collaboration:  Referral to another professional/service is not indicated at this time. Pt is engaging in TMS.    Anticipated number of sessions for this episode of care: 50  Anticipated frequency of sessions: weekly or Biweekly  Anticipated duration of each session: 53 or more minutes due to intensity of trauma symptoms (continued)  Treatment plan will be reviewed in 90 days or when goals have been changed.     Measurable Treatment Goal(s) related to diagnosis / functional impairment(s)  Goal 1: Patient will \"jumpstart, getting going with the things I need to be doing around the house as far as picking up, doing things, trying to do something every day.  Also to lessen the animosity between me and my .\"    I will know I've met my goal when my shoulders are fixed and I can see.      Objective #A (Patient Action)    Patient will  increase frequency of engaging her in ADLs .  Status: Continued - Date(s): 12/10/21, 3/9/22, 6/9/22, 9/2/22, 12/1/22, 3/2/23, 6/6/23, 9/13/23, 12/14/23, 3/15/24    Intervention(s)  Therapist will  engage patient in CBT, specifically behavioral activation .    Objective #B  Patient will   track and record at least 5 pleasant exchanges with . Patient will be able to identify at least 5 positive traits about her  and how he relates to her .  Status: Continued - Date(s): 12/10/21, 3/9/22, 6/9/22, 9/2/22, 12/1/22, 3/2/23, 6/6/23, 9/13/23, 12/14/23, 3/15/24    Intervention(s)  Therapist will  teach assertiveness skills and assign homework related to relationship interactions .    Objective #C  Patient will  reduce level of depressive " "and anxious features as evidenced by reduction in score on her CHAVO-7 and PHQ-9 (scores of 15 and 16 at first measurment, respectively) .  Status: Continued - Date(s): 12/10/21, 3/9/22, 6/9/22, 9/2/22, 12/1/22, 3/2/23, 6/6/23, 9/13/23, 12/14/23, 3/15/24    Intervention(s)  Therapist will  engage patient in person-centered therapy and CBT .    Patient has reviewed and agreed to the above plan.    Treatment plan was developed with previous provider and adopted by new therapist.      Reina Hoang  March 15, 2024                                                   Randi Cleary          SAFETY PLAN:  Step 1: Warning signs / cues (Thoughts, images, mood, situation, behavior) that a crisis may be developing:  Thoughts: \"I don't want to continue\" \"I am unwanted\"  Images: none  Thinking Processes: ruminating  Mood: anger  Behaviors: isolating/withdrawing , can't stop crying, not taking care of myself and not taking care of my responsibilities  Situations: small triggers, such as not being able to find something, or dropping something   Step 2: Coping strategies - Things I can do to take my mind off of my problems without contacting another person (relaxation technique, physical activity):  Distress Tolerance Strategies:  arts and crafts: drawing, play with my pet , listen to positive and upbeat music: any, change body temperature (ice pack/cold water)  and paced breathing/progressive muscle relaxation  Physical Activities: go for a walk, deep breathing and stretching   Focus on helpful thoughts:  \"You've been through this before, you can get through it again.\"  Step 3: People and social settings that provide distraction:                 Name: Carmen                            Name: Darien                           Name: Aleida       pool, shopping, Carmen's house, [employer]       Step 4: Remind myself of people and things that are important to me and worth living for:  Clifford Little Donna, post-COVID world, options of what " could be in your future        Step 5: When I am in crisis, I can ask these people to help me use my safety plan:                 Name: Sidney  Step 6: Making the environment safe:   go to sleep/daydream  Step 7: Professionals or agencies I can contact during a crisis:  [facility] Daytime Number: [phone]  Suicide Prevention Lifeline: 7-842-209-TALK (8255)  Crisis Text Line Service (available 24 hours a day, 7 days a week): Text [place] to 999993  Local Crisis Services: [facility] Crisis: [phone]  Adults can always access to the emPATH unit at [facility] (no phone number, utilize it like an urgent care or ER where you just show up)     Call 911 or go to my nearest emergency department.       I helped develop this safety plan and agree to use it when needed.  I have been given a copy of this plan.       Client signature _________________________________________________________________  Today s date:  11/24/2020  Adapted from Safety Plan Template 2008 Randi Poole and Robby Barba is reprinted with the express permission of the authors.  No portion of the Safety Plan Template may be reproduced without the express, written permission.  You can contact the authors at [email or web address] or [email or web address].

## 2024-04-09 ENCOUNTER — OFFICE VISIT (OUTPATIENT)
Dept: PSYCHIATRY | Facility: CLINIC | Age: 69
End: 2024-04-09
Payer: MEDICARE

## 2024-04-09 ENCOUNTER — OFFICE VISIT (OUTPATIENT)
Dept: PSYCHIATRY | Facility: CLINIC | Age: 69
End: 2024-04-09
Attending: PSYCHOLOGIST
Payer: MEDICARE

## 2024-04-09 DIAGNOSIS — F43.10 PTSD (POST-TRAUMATIC STRESS DISORDER): ICD-10-CM

## 2024-04-09 DIAGNOSIS — F33.2 SEVERE EPISODE OF RECURRENT MAJOR DEPRESSIVE DISORDER, WITHOUT PSYCHOTIC FEATURES (H): Primary | ICD-10-CM

## 2024-04-09 DIAGNOSIS — F41.1 GENERALIZED ANXIETY DISORDER: ICD-10-CM

## 2024-04-09 DIAGNOSIS — F31.81 BIPOLAR 2 DISORDER (H): Primary | ICD-10-CM

## 2024-04-09 PROCEDURE — 90853 GROUP PSYCHOTHERAPY: CPT | Mod: GC

## 2024-04-09 ASSESSMENT — PATIENT HEALTH QUESTIONNAIRE - PHQ9
10. IF YOU CHECKED OFF ANY PROBLEMS, HOW DIFFICULT HAVE THESE PROBLEMS MADE IT FOR YOU TO DO YOUR WORK, TAKE CARE OF THINGS AT HOME, OR GET ALONG WITH OTHER PEOPLE: VERY DIFFICULT
SUM OF ALL RESPONSES TO PHQ QUESTIONS 1-9: 13
10. IF YOU CHECKED OFF ANY PROBLEMS, HOW DIFFICULT HAVE THESE PROBLEMS MADE IT FOR YOU TO DO YOUR WORK, TAKE CARE OF THINGS AT HOME, OR GET ALONG WITH OTHER PEOPLE: VERY DIFFICULT
SUM OF ALL RESPONSES TO PHQ QUESTIONS 1-9: 13

## 2024-04-09 NOTE — PROGRESS NOTES
" Interventional Psychiatry Program  [address], [address]  [place] [address]  TMS Procedure Note   Randi Cleary MRN# [id number]  Age: 68 year old    YOB: 1955    Winnie Cleary comes into clinic today at the request of, Arnaldo Varela MD, ordering provider for TMS treatments.     Pre-Procedure:  History and Physical: Reviewed in medical record  Consent signed by: Randi Cleary for this course of treatment on: 3/6/2024      Clinical Narrative:  Patient tolerating treatment. Doing good today. Had a good group session this morning.    Indications for TMS:  MDD, recurrent, severe; 4+ medication trials (from 2+ classes) ineffective; Psychotherapy ineffective     Procedure Diagnosis:  MDD, severe, recurrent, F33.2    Treatment Hx:  Treatment number this series: 23  Total lifetime treatment number: 23    Allergies   Allergen Reactions    Serotonin Reuptake Inhibitors (Ssris) Anxiety, Difficulty breathing, Headache, Palpitations and Shortness Of Breath    Buspirone      The patient states she had serotonin syndrome    Cephalexin      Other reaction(s): unknown rxn.    Desvenlafaxine      Serotonin syndrome    Diclofenac Sodium [Diclofenac]      Serotonin syndrome and restless legs syndrome    Gabapentin      Drove on the wrong side of the highway    Levofloxacin      \"CAN'T REMEMBER\"    Penicillins      \"SORES IN MOUTH\"    Riluzole Difficulty breathing and Swelling    Sulfa Antibiotics      \"PT DOES NOT KNOW WHAT THE REACTION WAS\"    Topiramate Other (See Comments)     Frequent urination      LMP  (LMP Unknown)     Pause for the Cause  Right patient: Yes  Right procedure/correct coil:  Yes; rTMS; [id number]; H1 coil.   Earplugs in place:  Yes    Procedure  Patient was seated in procedure chair. Identity and procedure was verified. Ear plugs were placed in ears and patient-specific cap was placed on head and tightened appropriately. Ruler locations were verified. Bone conducting " headphones were not used.  Coil was placed at 0 - eyebrow - 14 and stimulator was set to parameters below.  Initial train was well tolerated so 55 trains were delivered.  Patient tolerated procedure well with minimal right eyebrow movement.      Motor Threshold Determination  Distance from nasion to inion: 35.5cm  MT 1: 0 - 7 - 14 @ 61% on 03/06/2024    Standard LDLPFC  Frequency: 18  Train Duration: 2  Total pulses delivered: 1980  Inter-train interval: 20  Tx Loc: 0 - eyebrows - 14    Energy: 73% (120%MT)  Trains: 55      *Use spacer*      Date MADRS QIDS PHQ-9   03/06/2024 33 18 21   3/15/2024  13 15   3/22/24  16 18   3/29/24  16 8   4/5/24  18 11                         4/8/2024     8:52 AM 4/8/2024    11:14 AM 4/9/2024     8:18 AM   PHQ-9 SCORE   PHQ-9 Total Score MyChart 14 (Moderate depression) 17 (Moderately severe depression) 13 (Moderate depression)   PHQ-9 Total Score 14    14 17 13    13     Plan   - Continue TMS treatments    The service provided today was under the supervising provider of the day, Toan Cotter MD, who was available if needed.     Aparna Colindres, TMS Technician  [facility]

## 2024-04-09 NOTE — TELEPHONE ENCOUNTER
Spoke with patient. Provider message relayed. Patient's PCP prescribed sleep aid which patient is using sparingly as needed.     Eli SIMENTAL RN  [facility]

## 2024-04-10 ENCOUNTER — VIRTUAL VISIT (OUTPATIENT)
Dept: PSYCHOLOGY | Facility: OTHER | Age: 69
End: 2024-04-10
Payer: MEDICARE

## 2024-04-10 ENCOUNTER — OFFICE VISIT (OUTPATIENT)
Dept: PSYCHIATRY | Facility: CLINIC | Age: 69
End: 2024-04-10
Payer: MEDICARE

## 2024-04-10 ENCOUNTER — TELEPHONE (OUTPATIENT)
Dept: CARDIOLOGY | Facility: CLINIC | Age: 69
End: 2024-04-10
Payer: MEDICARE

## 2024-04-10 DIAGNOSIS — F41.1 GENERALIZED ANXIETY DISORDER: ICD-10-CM

## 2024-04-10 DIAGNOSIS — F33.2 SEVERE EPISODE OF RECURRENT MAJOR DEPRESSIVE DISORDER, WITHOUT PSYCHOTIC FEATURES (H): Primary | ICD-10-CM

## 2024-04-10 DIAGNOSIS — F43.9 TRAUMA AND STRESSOR-RELATED DISORDER: ICD-10-CM

## 2024-04-10 DIAGNOSIS — F31.81 BIPOLAR 2 DISORDER (H): Primary | ICD-10-CM

## 2024-04-10 PROCEDURE — 90837 PSYTX W PT 60 MINUTES: CPT | Mod: 95 | Performed by: MARRIAGE & FAMILY THERAPIST

## 2024-04-10 ASSESSMENT — PATIENT HEALTH QUESTIONNAIRE - PHQ9
SUM OF ALL RESPONSES TO PHQ QUESTIONS 1-9: 13
SUM OF ALL RESPONSES TO PHQ QUESTIONS 1-9: 13

## 2024-04-10 NOTE — PROGRESS NOTES
[facility]      Women's Group Therapy., N=3  Co-facilitators:  Navin Delatorre and Bravo Silveira  Start time:  10:30am   End Time: 11:56 am  Diagnoses:  Bi-polar-II, Generalized anxiety, PTSD, Alcohol dependency-in remission 1.5 years after 20 years of sobriety.    Date: 4/9/2024     Group:  S/O: Reviewed Homework and leftover topics from last week. Set new homework for the next group session        1. Engagement with Others/Community Events: A group member brought up the recent eclipse and interest in seeing it, and others shared their experiences with eclipses in the past and how communities identify events and participate in them.   Winnie shared appropriately around her experience with the eclipse and that she found it interesting. Was also actively participating in discussion about how she knew about it and finding interest in events that are of particular interest to the community at large.    2. Ruminative Thinking: The topic of rumination and getting stuck in a pattern of thinking was discussed. Particularly, when thoughts become stuck to the point that people don't engage in activities or get out to do things because of those thoughts and ruminations.   Winnie reflected on the idea of needing structure to help with this kind of thinking and empathized with other group members around how it can be a significant barrier to doing things that need to get done.     3. Control and Life Preferences: The topic of having rigid preferences about certain things, such as how things are done, avoiding certain things, or wanting to do certain activities daily.  Winnie shared that while she doesn't identify with the rigid preferences for things that others did, she has experienced the frustration of feeling limited by rigid guidelines. Shared an anecdote about being asked to dust a very large staircase as a child and sitting on the steps and refusing to do so until her  parents gave up on trying to force her to do it.    Did not discuss any medication changes, but noted that she is continuing to participate in TMS.    Assessment:  Patient came on time. She was verbal and engaging. This is her third group since starting mid-February. The hope is that as she begins to feel more comfortable in the group setting she will be more committed to attending groups weekly.    MENTAL STATUS EXAM  Appearance: appropriate  Attitude: cooperative  Behavior: normal  Eyre Contact: normal  Speech: normal  Orientation: oriented to person , place, time and situation  Mood:  admits loneliness and boredom.   Affect: Mood Congruent  Thought Process: clear  Suicidal Ideation: NO   Hallucination: no      Plan: Continue to attend group weekly    Homework:   Attend group weekly  Go to the pool at [employer] 1x OR walk outside 1x

## 2024-04-10 NOTE — PROGRESS NOTES
" Interventional Psychiatry Program  [address], [address]  [place] [address]  TMS Procedure Note   Randi Cleary MRN# [id number]  Age: 68 year old    YOB: 1955    Winnie Cleary comes into clinic today at the request of, Arnaldo Varela MD, ordering provider for TMS treatments.     Pre-Procedure:  History and Physical: Reviewed in medical record  Consent signed by: Randi Cleary for this course of treatment on: 3/6/2024      Clinical Narrative:  Patient tolerating treatment. Going to look for new shoes today.    Indications for TMS:  MDD, recurrent, severe; 4+ medication trials (from 2+ classes) ineffective; Psychotherapy ineffective     Procedure Diagnosis:  MDD, severe, recurrent, F33.2    Treatment Hx:  Treatment number this series: 24  Total lifetime treatment number: 24    Allergies   Allergen Reactions    Serotonin Reuptake Inhibitors (Ssris) Anxiety, Difficulty breathing, Headache, Palpitations and Shortness Of Breath    Buspirone      The patient states she had serotonin syndrome    Cephalexin      Other reaction(s): unknown rxn.    Desvenlafaxine      Serotonin syndrome    Diclofenac Sodium [Diclofenac]      Serotonin syndrome and restless legs syndrome    Gabapentin      Drove on the wrong side of the highway    Levofloxacin      \"CAN'T REMEMBER\"    Penicillins      \"SORES IN MOUTH\"    Riluzole Difficulty breathing and Swelling    Sulfa Antibiotics      \"PT DOES NOT KNOW WHAT THE REACTION WAS\"    Topiramate Other (See Comments)     Frequent urination      LMP  (LMP Unknown)     Pause for the Cause  Right patient: Yes  Right procedure/correct coil:  Yes; rTMS; [id number]; H1 coil.   Earplugs in place:  Yes    Procedure  Patient was seated in procedure chair. Identity and procedure was verified. Ear plugs were placed in ears and patient-specific cap was placed on head and tightened appropriately. Ruler locations were verified. Bone conducting headphones were not used.  " Coil was placed at 0 - eyebrow - 14 and stimulator was set to parameters below.  Initial train was well tolerated so 55 trains were delivered.  Patient tolerated procedure well with minimal right eyebrow movement.      Motor Threshold Determination  Distance from nasion to inion: 35.5cm  MT 1: 0 - 7 - 14 @ 61% on 03/06/2024    Standard LDLPFC  Frequency: 18  Train Duration: 2  Total pulses delivered: 1980  Inter-train interval: 20  Tx Loc: 0 - eyebrows - 14    Energy: 73% (120%MT)  Trains: 55      *Use spacer*      Date MADRS QIDS PHQ-9   03/06/2024 33 18 21   3/15/2024  13 15   3/22/24  16 18   3/29/24  16 8   4/5/24  18 11                         4/8/2024    11:14 AM 4/9/2024     8:18 AM 4/10/2024    10:39 AM   PHQ-9 SCORE   PHQ-9 Total Score MyChart 17 (Moderately severe depression) 13 (Moderate depression)    PHQ-9 Total Score 17 13    13 13     Plan   - Continue TMS treatments    The service provided today was under the supervising provider of the day, Elías Austin MD, who was available if needed.     Vee Loja, TMS Technician  [facility]

## 2024-04-10 NOTE — PATIENT INSTRUCTIONS
Patient reports that her goals for the next week include:    Get to the pool  Get fitted for shoes

## 2024-04-11 ENCOUNTER — OFFICE VISIT (OUTPATIENT)
Dept: PSYCHIATRY | Facility: CLINIC | Age: 69
End: 2024-04-11
Payer: MEDICARE

## 2024-04-11 ENCOUNTER — ALLIED HEALTH/NURSE VISIT (OUTPATIENT)
Dept: PSYCHIATRY | Facility: CLINIC | Age: 69
End: 2024-04-11
Payer: MEDICARE

## 2024-04-11 DIAGNOSIS — F33.2 SEVERE EPISODE OF RECURRENT MAJOR DEPRESSIVE DISORDER, WITHOUT PSYCHOTIC FEATURES (H): Primary | ICD-10-CM

## 2024-04-11 ASSESSMENT — PATIENT HEALTH QUESTIONNAIRE - PHQ9: SUM OF ALL RESPONSES TO PHQ QUESTIONS 1-9: 17

## 2024-04-11 NOTE — PROGRESS NOTES
" Interventional Psychiatry Program  [address], [address]  [place] [address]  TMS Procedure Note   Randi Cleary MRN# [id number]  Age: 68 year old    YOB: 1955    Winnie Cleary comes into clinic today at the request of, Arnaldo Varela MD, ordering provider for TMS treatments.     Pre-Procedure:  History and Physical: Reviewed in medical record  Consent signed by: Randi Cleary for this course of treatment on: 3/6/2024      Clinical Narrative:  Patient tolerating treatment. Nurse check in today.    Indications for TMS:  MDD, recurrent, severe; 4+ medication trials (from 2+ classes) ineffective; Psychotherapy ineffective     Procedure Diagnosis:  MDD, severe, recurrent, F33.2    Treatment Hx:  Treatment number this series: 25  Total lifetime treatment number: 25    Allergies   Allergen Reactions    Serotonin Reuptake Inhibitors (Ssris) Anxiety, Difficulty breathing, Headache, Palpitations and Shortness Of Breath    Buspirone      The patient states she had serotonin syndrome    Cephalexin      Other reaction(s): unknown rxn.    Desvenlafaxine      Serotonin syndrome    Diclofenac Sodium [Diclofenac]      Serotonin syndrome and restless legs syndrome    Gabapentin      Drove on the wrong side of the highway    Levofloxacin      \"CAN'T REMEMBER\"    Penicillins      \"SORES IN MOUTH\"    Riluzole Difficulty breathing and Swelling    Sulfa Antibiotics      \"PT DOES NOT KNOW WHAT THE REACTION WAS\"    Topiramate Other (See Comments)     Frequent urination      LMP  (LMP Unknown)     Pause for the Cause  Right patient: Yes  Right procedure/correct coil:  Yes; rTMS; [id number]; H1 coil.   Earplugs in place:  Yes    Procedure  Patient was seated in procedure chair. Identity and procedure was verified. Ear plugs were placed in ears and patient-specific cap was placed on head and tightened appropriately. Ruler locations were verified. Bone conducting headphones were not used.  Coil was placed " at 0 - eyebrow - 14 and stimulator was set to parameters below.  Initial train was well tolerated so 55 trains were delivered.  Patient tolerated procedure well with minimal right eyebrow movement.    Motor Threshold Determination  Distance from nasion to inion: 35.5cm  MT 1: 0 - 7 - 14 @ 61% on 03/06/2024    Standard LDLPFC  Frequency: 18  Train Duration: 2  Total pulses delivered: 1980  Inter-train interval: 20  Tx Loc: 0 - eyebrows - 14    Energy: 73% (120%MT)  Trains: 55   *Use spacer*    Date MADRS QIDS PHQ-9   03/06/2024 33 18 21   3/15/2024  13 15   3/22/24  16 18   3/29/24  16 8   4/5/24  18 11                         4/8/2024    11:14 AM 4/9/2024     8:18 AM 4/10/2024    10:39 AM   PHQ-9 SCORE   PHQ-9 Total Score MyChart 17 (Moderately severe depression) 13 (Moderate depression)    PHQ-9 Total Score 17 13    13 13     Plan   - Continue TMS treatments    The service provided today was under the supervising provider of the day, Toan Cotter MD, who was available if needed.     Venice Beckham, TMS Technician  [facility]

## 2024-04-11 NOTE — PROGRESS NOTES
Physicians:  Care Coordination Note     SITUATION   Randi Cleary is a 68 year old female who has been receiving Transcranial Magnetic Stimulation (TMS) at the request of Arnaldo Varela MD.    BACKGROUND     During course of TMS    ASSESSMENT        Met with patient today to check on her during TMS course.    During today's discussion writer and patient discussed:    Patient reports that TMS is going well.  She states that she plans on writing things down as there have been so many benefits and some of them are slight, but they are there.  She states that the days where she is feeling more overwhelmed and tearful have been happening less frequently; she also notes she recovers from them quicker as well.  She also notes that she had to change therapists recently and this has been very difficult as she is having to go through her whole history again with a new person.  As we were talking she shared that she would really like to stop her cannabis use.  She feels like it is getting in the way of her continuing to make strides in her mood symptoms.  She reviewed her history of EOTH use and how she was able to stop that.  We discussed that she can talk to pain clinic as they are the ones who prescribed it-she mentions that she does not want to do this as when she relapsed on EOTH and shared it with them she felt like they handled it in a very punishing sort of way instead of a supportive way.  Writer agreed to send her NA/AA meeting resources as a first step.           PHQ 9: 17  # of completed TMS Sessions: 24  Toleration of TMS: tolerating well          PLAN       Nursing Interventions: TMS edu    Follow-up plan:  RN to continue to follow during TMS    Orly Johnson RN

## 2024-04-12 ENCOUNTER — OFFICE VISIT (OUTPATIENT)
Dept: PSYCHIATRY | Facility: CLINIC | Age: 69
End: 2024-04-12
Payer: MEDICARE

## 2024-04-12 DIAGNOSIS — F33.2 SEVERE EPISODE OF RECURRENT MAJOR DEPRESSIVE DISORDER, WITHOUT PSYCHOTIC FEATURES (H): Primary | ICD-10-CM

## 2024-04-12 ASSESSMENT — PATIENT HEALTH QUESTIONNAIRE - PHQ9: SUM OF ALL RESPONSES TO PHQ QUESTIONS 1-9: 16

## 2024-04-12 NOTE — PROGRESS NOTES
" Interventional Psychiatry Program  [address], [address]  [place] [address]  TMS Procedure Note   Randi Cleary MRN# [id number]  Age: 68 year old    YOB: 1955    Winnie Cleary comes into clinic today at the request of, Arnaldo Varela MD, ordering provider for TMS treatments.     Pre-Procedure:  History and Physical: Reviewed in medical record  Consent signed by: Randi Claery for this course of treatment on: 3/6/2024      Clinical Narrative:  Patient tolerating treatment. Patient is doing good today.    Indications for TMS:  MDD, recurrent, severe; 4+ medication trials (from 2+ classes) ineffective; Psychotherapy ineffective     Procedure Diagnosis:  MDD, severe, recurrent, F33.2    Treatment Hx:  Treatment number this series: 26  Total lifetime treatment number: 26    Allergies   Allergen Reactions    Serotonin Reuptake Inhibitors (Ssris) Anxiety, Difficulty breathing, Headache, Palpitations and Shortness Of Breath    Buspirone      The patient states she had serotonin syndrome    Cephalexin      Other reaction(s): unknown rxn.    Desvenlafaxine      Serotonin syndrome    Diclofenac Sodium [Diclofenac]      Serotonin syndrome and restless legs syndrome    Gabapentin      Drove on the wrong side of the highway    Levofloxacin      \"CAN'T REMEMBER\"    Penicillins      \"SORES IN MOUTH\"    Riluzole Difficulty breathing and Swelling    Sulfa Antibiotics      \"PT DOES NOT KNOW WHAT THE REACTION WAS\"    Topiramate Other (See Comments)     Frequent urination      LMP  (LMP Unknown)     Pause for the Cause  Right patient: Yes  Right procedure/correct coil:  Yes; rTMS; [id number]; H1 coil.   Earplugs in place:  Yes    Procedure  Patient was seated in procedure chair. Identity and procedure was verified. Ear plugs were placed in ears and patient-specific cap was placed on head and tightened appropriately. Ruler locations were verified. Bone conducting headphones were not used.  Coil was " placed at 0 - eyebrow - 14 and stimulator was set to parameters below.  Initial train was well tolerated so 55 trains were delivered.  Patient tolerated procedure well with minimal right eyebrow movement.    Motor Threshold Determination  Distance from nasion to inion: 35.5cm  MT 1: 0 - 7 - 14 @ 61% on 03/06/2024    Standard LDLPFC  Frequency: 18  Train Duration: 2  Total pulses delivered: 1980  Inter-train interval: 20  Tx Loc: 0 - eyebrows - 14    Energy: 73% (120%MT)  Trains: 55   *Use spacer*    Date MADRS QIDS PHQ-9   03/06/2024 33 18 21   3/15/2024  13 15   3/22/24  16 18   3/29/24  16 8   4/5/24  18 11   4/12/24  16 16                   4/10/2024    10:39 AM 4/11/2024     2:09 PM 4/12/2024    10:49 AM   PHQ-9 SCORE   PHQ-9 Total Score 13 17 16     Plan   - Continue TMS treatments    The service provided today was under the supervising provider of the day, Reva Hutchison MD, who was available if needed.     Vee Loja, TMS Technician  [facility]  [facility]

## 2024-04-15 ENCOUNTER — OFFICE VISIT (OUTPATIENT)
Dept: PSYCHIATRY | Facility: CLINIC | Age: 69
End: 2024-04-15
Payer: MEDICARE

## 2024-04-15 DIAGNOSIS — F33.2 SEVERE EPISODE OF RECURRENT MAJOR DEPRESSIVE DISORDER, WITHOUT PSYCHOTIC FEATURES (H): Primary | ICD-10-CM

## 2024-04-15 ASSESSMENT — PATIENT HEALTH QUESTIONNAIRE - PHQ9: SUM OF ALL RESPONSES TO PHQ QUESTIONS 1-9: 19

## 2024-04-15 NOTE — PROGRESS NOTES
" Interventional Psychiatry Program  [address], [address]  [place] [address]  TMS Procedure Note   Randi Cleary MRN# [id number]  Age: 68 year old    YOB: 1955    Winnie Cleary comes into clinic today at the request of, Arnaldo Varela MD, ordering provider for TMS treatments.     Pre-Procedure:  History and Physical: Reviewed in medical record  Consent signed by: Randi Cleary for this course of treatment on: 3/6/2024      Clinical Narrative:  Patient tolerating treatment. Patient is not doing well after receiving a message from a family member over the weekend.    Indications for TMS:  MDD, recurrent, severe; 4+ medication trials (from 2+ classes) ineffective; Psychotherapy ineffective     Procedure Diagnosis:  MDD, severe, recurrent, F33.2    Treatment Hx:  Treatment number this series: 27  Total lifetime treatment number: 27    Allergies   Allergen Reactions    Serotonin Reuptake Inhibitors (Ssris) Anxiety, Difficulty breathing, Headache, Palpitations and Shortness Of Breath    Buspirone      The patient states she had serotonin syndrome    Cephalexin      Other reaction(s): unknown rxn.    Desvenlafaxine      Serotonin syndrome    Diclofenac Sodium [Diclofenac]      Serotonin syndrome and restless legs syndrome    Gabapentin      Drove on the wrong side of the highway    Levofloxacin      \"CAN'T REMEMBER\"    Penicillins      \"SORES IN MOUTH\"    Riluzole Difficulty breathing and Swelling    Sulfa Antibiotics      \"PT DOES NOT KNOW WHAT THE REACTION WAS\"    Topiramate Other (See Comments)     Frequent urination      LMP  (LMP Unknown)     Pause for the Cause  Right patient: Yes  Right procedure/correct coil:  Yes; rTMS; [id number]; H1 coil.   Earplugs in place:  Yes    Procedure  Patient was seated in procedure chair. Identity and procedure was verified. Ear plugs were placed in ears and patient-specific cap was placed on head and tightened appropriately. Ruler locations were " verified. Bone conducting headphones were not used.  Coil was placed at 0 - eyebrow - 14 and stimulator was set to parameters below.  Initial train was well tolerated so 55 trains were delivered.  Patient tolerated procedure well with minimal right eyebrow movement.    Motor Threshold Determination  Distance from nasion to inion: 35.5cm  MT 1: 0 - 7 - 14 @ 61% on 03/06/2024    Standard LDLPFC  Frequency: 18  Train Duration: 2  Total pulses delivered: 1980  Inter-train interval: 20  Tx Loc: 0 - eyebrows - 14    Energy: 73% (120%MT)  Trains: 55   *Use spacer*    Date MADRS QIDS PHQ-9   03/06/2024 33 18 21   3/15/2024  13 15   3/22/24  16 18   3/29/24  16 8   4/5/24  18 11   4/12/24  16 16                   4/11/2024     2:09 PM 4/12/2024    10:49 AM 4/15/2024    10:45 AM   PHQ-9 SCORE   PHQ-9 Total Score 17 16 19     Plan   - Continue TMS treatments    The service provided today was under the supervising provider of the day, Reva Hutchison MD, who was available if needed.     Vee Loja, TMS Technician  [facility]

## 2024-04-16 ENCOUNTER — TELEPHONE (OUTPATIENT)
Dept: PSYCHIATRY | Facility: CLINIC | Age: 69
End: 2024-04-16

## 2024-04-16 NOTE — TELEPHONE ENCOUNTER
"Writer returned call to patient.  She reports having a very difficult day today.  States that she will be doing well and then all of a sudden she has an stressor and then she becomes very upset by it.  She states that she had an issue with her  last night and thing \"came to a head.\"      She reports that she did not sleep well last night and when she doesn't sleep well she also does not eat well.  We discussed that importance of eating as she says she is dizzy.  She reports that she does not think she can make it to TMS today as she just got up 30 minutes ago and it takes over 45 minutes to get here.         She was interested in trying our therapy with our psychologist as she recently had to switch and she is not sure how it is going.    "

## 2024-04-16 NOTE — PROGRESS NOTES
[facility]                                     Progress Note    Patient Name: Randi Cleary  Date: 4/17/2024         Service Type: Individual     Session Start Time: 2:05 p.m. Session End Time: 2:59 p.m.     Session Length: 54 minutes    Session #: 3 with this provider (patient is transferring due to previous provider's leave)    Attendees: Client attended alone    Service Modality:  Video Visit:      Provider verified identity through the following two-step process.  Patient provided:  Patient is known previously to provider and Patient was verified at admission/transfer    Telemedicine Visit: The patient's condition can be safely assessed and treated via synchronous audio and visual telemedicine encounter.      Reason for Telemedicine Visit: Patient convenience (e.g. access to timely appointments / distance to available provider)    Originating Site (Patient Location): Patient's home    Site (Provider Location): [facility]: [place]    Consent:  The patient/guardian has verbally consented to: the potential risks and benefits of telemedicine (video visit) versus in person care; bill my insurance or make self-payment for services provided; and responsibility for payment of non-covered services.     Patient would like the video invitation sent by:  My Chart    Mode of Communication:  Video Conference via [employer]    Location (Provider):  On-site    As the provider I attest to compliance with applicable laws and regulations related to telemedicine.      DATA  Extended Session (53+ minutes): PROLONGED SERVICE IN THE OUTPATIENT SETTING REQUIRING DIRECT (FACE-TO-FACE) PATIENT CONTACT BEYOND THE USUAL SERVICE:    - Longer session due to limited access to mental health appointments and necessity to address patient's distress / complexity    - Treatment protocol required additional time to complete, due to the nature of diagnosis being treated.  See Interventions section for  details  Interactive Complexity: No  Crisis: No      Progress Since Last Session (Related to Symptoms / Goals / Homework):   Symptoms:  Patient reports thinking more about past trauma from her childhood and fractured family relationships; patient reports experiencing more grief this week.  Reports more conflict with .    Homework:  Some progress - went to TMS       Episode of Care Goals: Moderate progress - ACTION (Actively working towards change); Intervened by reinforcing change plan / affirming steps taken     Current / Ongoing Stressors and Concerns:   Patient is currently socially isolated. She has a conflictual relationship with her .  She is getting minimal physical activity.  She has had several surgeries. Patient is currently completing TMS treatment.     Treatment Objective(s) Addressed in This Session:   Patient will increase frequency of engaging in ADLs  Processed trauma and grief  Identify barriers to exercise and healthy eating; create plan to address and overcome barriers  Processed some family system issues and history of grief, loss, and trauma    Past/future:  Patient will track and record at least 5 pleasant exchanges with . Patient will be able to identify at least 5 positive traits about her .  Patient will reduce level of depressive and anxious features as evidenced by reduction in score on her CHAVO-7 and PHQ-9 (scores of 15 and 16 at first measurment, respectively).     Intervention:  Trauma lens  Family systems dynamics and patterns  Narrative therapy:  DBT/CBT    The following assessments were completed by patient for this visit:    PHQ9:       4/8/2024     8:52 AM 4/8/2024    11:14 AM 4/9/2024     8:18 AM 4/10/2024    10:39 AM 4/11/2024     2:09 PM 4/12/2024    10:49 AM 4/15/2024    10:45 AM   PHQ-9 SCORE   PHQ-9 Total Score MyChart 14 (Moderate depression) 17 (Moderately severe depression) 13 (Moderate depression)       PHQ-9 Total Score 14    14 17 13    13 13  17 16 19     GAD7:       10/31/2023     4:25 AM 12/1/2023    11:37 AM 12/18/2023     3:09 PM 1/15/2024    12:02 PM 1/29/2024     3:10 AM 3/5/2024     3:08 PM 4/4/2024     8:52 AM   CHAVO-7 SCORE   Total Score 15 (severe anxiety) 14 (moderate anxiety) 16 (severe anxiety) 14 (moderate anxiety) 16 (severe anxiety) 13 (moderate anxiety) 13 (moderate anxiety)   Total Score 15 14 16 14 16 13 13    13    13    13     PROMIS 10-Global Health (only subscores and total score):       7/21/2023    10:36 AM 7/28/2023     3:24 PM 8/7/2023     1:07 PM 9/20/2023    10:13 AM 10/31/2023     4:36 AM 12/1/2023    11:52 AM 1/22/2024    12:00 PM   PROMIS-10 Scores Only   Global Mental Health Score 5    5 5 6    6 5    5    5 6 5 5   Global Physical Health Score 7    7 7 8    8 9    9    9 8 8 9   PROMIS TOTAL - SUBSCORES 12    12 12 14    14 14    14    14 14 13 14      ASSESSMENT: Current Emotional / Mental Status (status of significant symptoms):   Risk status (Self / Other harm or suicidal ideation)   Patient denies current fears or concerns for personal safety.   Patient denies current or recent suicidal ideation or behaviors. Has hx of SI.   Patient denies current or recent homicidal ideation or behaviors.   Patient denies current or recent self injurious behavior or ideation.   Patient denies other safety concerns.   Patient reports there has been no change in risk factors since their last session.     Patient reports there has been no change in protective factors since their last session.     A safety and risk management plan has been developed including: Patient consented to co-developed safety plan on 11/24/2020, updated 2/20/23.  Safety and risk management plan was reviewed.   Patient agreed to use safety plan should any safety concerns arise.  A copy was made available to the patient.     Appearance:   Appropriate    Eye Contact:   Good    Psychomotor Behavior: Normal    Attitude:   Cooperative     Orientation:   All   Speech    Rate / Production: Normal/ Responsive    Volume:  Normal    Mood:    Anxious  Sad  Grieving   Affect:    Appropriate  Tearful   Thought Content:  Clear  Rumination    Thought Form:  Coherent  Flight of Ideas ; more difficult to track at times than usual   Insight:    Good      Medication Review:   No changes to current psychiatric medication(s)      Medication Compliance:   Yes     Changes in Health Issues:   None    Recent: Problems with new specialty shoe inserts/shoes     Chemical Use Review:   Substance Use: Chemical use reviewed, no active concerns identified ; Nothing used since August 2021.     Tobacco Use: No current tobacco use.      Diagnosis:  1. Bipolar 2 disorder (H)    2. Trauma and stressor-related disorder    3. Generalized anxiety disorder      Borderline personality disorder vs. Bipolar 2    Note: There has been demonstrated improvement in functioning while patient has been engaged in psychotherapy/psychological service- if withdrawn the patient would deteriorate and/or relapse.     Collateral Reports Completed:   Not Applicable    PLAN: (Patient Tasks / Therapist Tasks / Other)    Patient reports that her goals for the next two weeks include:    Continue to offer self robe and forgiveness (over and over again if needed)  Message women's group to let them know it's too much to attend during TMS but she can attend when it's over in three weeks  Continue to focus on TMS        Reina Hoang                                                           ______________________________________________________________________    Individual Treatment Plan    Patient's Name: Randi CHRISTIANSEN Cathy Evin  YOB: 1955    Date of Creation: 6/30/2020  Date Treatment Plan Last Reviewed/Revised: 3/15/2024    DSM5 Diagnoses: 296.89 Bipolar II Disorder Depressed, 300.02 (F41.1) Generalized Anxiety Disorder, or Adjustment Disorders  309.89 (F43.8) Other Specified Trauma and  "Stressor Related Disorder    Psychosocial / Contextual Factors: Patient's entire family of origin has , she now has a sister-in-law and  as support.  Relationship with  is conflictual. She is recovering from surgeries.    PROMIS (reviewed every 90 days): PROMIS-10 Scores    PROMIS 10-Global Health (only subscores and total score):   PROMIS-10 Scores Only 2021 3/15/2022 3/15/2022 3/15/2022 3/24/2022 2022 2022   Global Mental Health Score 6 6 6 6 8 12 12   Global Physical Health Score 9 9 9 9 8 11 10   PROMIS TOTAL - SUBSCORES 15 15 15 15 16 23 22     Referral / Collaboration:  Referral to another professional/service is not indicated at this time.  Pt is engaging in TMS.    Anticipated number of sessions for this episode of care: 50  Anticipated frequency of sessions: weekly or Biweekly  Anticipated duration of each session: 53 or more minutes due to intensity of trauma symptoms (continued)  Treatment plan will be reviewed in 90 days or when goals have been changed.     Measurable Treatment Goal(s) related to diagnosis / functional impairment(s)  Goal 1: Patient will \"jumpstart, getting going with the things I need to be doing around the house as far as picking up, doing things, trying to do something every day.  Also to lessen the animosity between me and my .\"    I will know I've met my goal when my shoulders are fixed and I can see.      Objective #A (Patient Action)    Patient will  increase frequency of engaging her in ADLs .  Status: Continued - Date(s): 12/10/21, 3/9/22, 22, 22, 22, 3/2/23, 23, 23, 23, 3/15/24    Intervention(s)  Therapist will  engage patient in CBT, specifically behavioral activation .    Objective #B  Patient will   track and record at least 5 pleasant exchanges with . Patient will be able to identify at least 5 positive traits about her  and how he relates to her .  Status: Continued - Date(s): 12/10/21, " "3/9/22, 6/9/22, 9/2/22, 12/1/22, 3/2/23, 6/6/23, 9/13/23, 12/14/23, 3/15/24    Intervention(s)  Therapist will  teach assertiveness skills and assign homework related to relationship interactions .    Objective #C  Patient will  reduce level of depressive and anxious features as evidenced by reduction in score on her CHAVO-7 and PHQ-9 (scores of 15 and 16 at first measurment, respectively) .  Status: Continued - Date(s): 12/10/21, 3/9/22, 6/9/22, 9/2/22, 12/1/22, 3/2/23, 6/6/23, 9/13/23, 12/14/23, 3/15/24    Intervention(s)  Therapist will  engage patient in person-centered therapy and CBT .    Patient has reviewed and agreed to the above plan.    Treatment plan was developed with previous provider and adopted by new therapist.      Reina Hoang  March 15, 2024                                                   Randi Cleary          SAFETY PLAN:  Step 1: Warning signs / cues (Thoughts, images, mood, situation, behavior) that a crisis may be developing:  Thoughts: \"I don't want to continue\" \"I am unwanted\"  Images: none  Thinking Processes: ruminating  Mood: anger  Behaviors: isolating/withdrawing , can't stop crying, not taking care of myself and not taking care of my responsibilities  Situations: small triggers, such as not being able to find something, or dropping something   Step 2: Coping strategies - Things I can do to take my mind off of my problems without contacting another person (relaxation technique, physical activity):  Distress Tolerance Strategies:  arts and crafts: drawing, play with my pet , listen to positive and upbeat music: any, change body temperature (ice pack/cold water)  and paced breathing/progressive muscle relaxation  Physical Activities: go for a walk, deep breathing and stretching   Focus on helpful thoughts:  \"You've been through this before, you can get through it again.\"  Step 3: People and social settings that provide distraction:                 Name: Carmen                      "       Name: Darien                           Name: Aleida       pool, shopping, Carmen's house, [employer]       Step 4: Remind myself of people and things that are important to me and worth living for:  Clifford Little Donna, post-COVID world, options of what could be in your future        Step 5: When I am in crisis, I can ask these people to help me use my safety plan:                 Name: Sidney  Step 6: Making the environment safe:   go to sleep/daydream  Step 7: Professionals or agencies I can contact during a crisis:  [facility] Daytime Number: [phone]  Suicide Prevention Lifeline: 3-243-382-TALK 8255)  Crisis Text Line Service (available 24 hours a day, 7 days a week): Text [place] to 832914  Local Crisis Services: [facility] Crisis: [phone]  Adults can always access to the emPATH unit at [facility] (no phone number, utilize it like an urgent care or ER where you just show up)     Call 911 or go to my nearest emergency department.       I helped develop this safety plan and agree to use it when needed.  I have been given a copy of this plan.       Client signature _________________________________________________________________  Today s date:  11/24/2020  Adapted from Safety Plan Template 2008 Randi Poole and Robby Barba is reprinted with the express permission of the authors.  No portion of the Safety Plan Template may be reproduced without the express, written permission.  You can contact the authors at [email or web address] or [email or web address].

## 2024-04-16 NOTE — TELEPHONE ENCOUNTER
Patient called asking to speak to a nurse, patient is having a hard time states that she might not be able to make it to her TMS treatment appointment on 04/16/2024.  Informed that  will keep the appointment on the books send the message to a nurse and have someone call back.   Patient states that she will continue to get ready, informed that her appointment is not till 4 pm.   Phone number on file is correct number to call

## 2024-04-17 ENCOUNTER — OFFICE VISIT (OUTPATIENT)
Dept: PSYCHIATRY | Facility: CLINIC | Age: 69
End: 2024-04-17
Payer: MEDICARE

## 2024-04-17 ENCOUNTER — VIRTUAL VISIT (OUTPATIENT)
Dept: PSYCHOLOGY | Facility: OTHER | Age: 69
End: 2024-04-17
Payer: MEDICARE

## 2024-04-17 DIAGNOSIS — F31.81 BIPOLAR 2 DISORDER (H): Primary | ICD-10-CM

## 2024-04-17 DIAGNOSIS — F41.1 GENERALIZED ANXIETY DISORDER: ICD-10-CM

## 2024-04-17 DIAGNOSIS — F43.9 TRAUMA AND STRESSOR-RELATED DISORDER: ICD-10-CM

## 2024-04-17 DIAGNOSIS — F33.2 SEVERE EPISODE OF RECURRENT MAJOR DEPRESSIVE DISORDER, WITHOUT PSYCHOTIC FEATURES (H): Primary | ICD-10-CM

## 2024-04-17 PROCEDURE — 90837 PSYTX W PT 60 MINUTES: CPT | Mod: 95 | Performed by: MARRIAGE & FAMILY THERAPIST

## 2024-04-17 ASSESSMENT — PATIENT HEALTH QUESTIONNAIRE - PHQ9: SUM OF ALL RESPONSES TO PHQ QUESTIONS 1-9: 23

## 2024-04-17 NOTE — PROGRESS NOTES
" Interventional Psychiatry Program  [address], [address]  [place] [address]  TMS Procedure Note   Randi Cleary MRN# [id number]  Age: 68 year old    YOB: 1955    Winnie Cleary comes into clinic today at the request of, Arnaldo Varela MD, ordering provider for TMS treatments.     Pre-Procedure:  History and Physical: Reviewed in medical record  Consent signed by: Randi Cleary for this course of treatment on: 3/6/2024      Clinical Narrative:  Patient tolerating treatment. Patient is in a bit better spirits today.    Indications for TMS:  MDD, recurrent, severe; 4+ medication trials (from 2+ classes) ineffective; Psychotherapy ineffective     Procedure Diagnosis:  MDD, severe, recurrent, F33.2    Treatment Hx:  Treatment number this series: 28  Total lifetime treatment number: 28    Allergies   Allergen Reactions    Serotonin Reuptake Inhibitors (Ssris) Anxiety, Difficulty breathing, Headache, Palpitations and Shortness Of Breath    Buspirone      The patient states she had serotonin syndrome    Cephalexin      Other reaction(s): unknown rxn.    Desvenlafaxine      Serotonin syndrome    Diclofenac Sodium [Diclofenac]      Serotonin syndrome and restless legs syndrome    Gabapentin      Drove on the wrong side of the highway    Levofloxacin      \"CAN'T REMEMBER\"    Penicillins      \"SORES IN MOUTH\"    Riluzole Difficulty breathing and Swelling    Sulfa Antibiotics      \"PT DOES NOT KNOW WHAT THE REACTION WAS\"    Topiramate Other (See Comments)     Frequent urination      LMP  (LMP Unknown)     Pause for the Cause  Right patient: Yes  Right procedure/correct coil:  Yes; rTMS; [id number]; H1 coil.   Earplugs in place:  Yes    Procedure  Patient was seated in procedure chair. Identity and procedure was verified. Ear plugs were placed in ears and patient-specific cap was placed on head and tightened appropriately. Ruler locations were verified. Bone conducting headphones were not " used.  Coil was placed at 0 - eyebrow - 14 and stimulator was set to parameters below.  Initial train was well tolerated so 55 trains were delivered.  Patient tolerated procedure well with minimal right eyebrow movement.    Motor Threshold Determination  Distance from nasion to inion: 35.5cm  MT 1: 0 - 7 - 14 @ 61% on 03/06/2024    Standard LDLPFC  Frequency: 18  Train Duration: 2  Total pulses delivered: 1980  Inter-train interval: 20  Tx Loc: 0 - eyebrows - 14    Energy: 73% (120%MT)  Trains: 55   *Use spacer*    Date MADRS QIDS PHQ-9   03/06/2024 33 18 21   3/15/2024  13 15   3/22/24  16 18   3/29/24  16 8   4/5/24  18 11   4/12/24  16 16                   4/12/2024    10:49 AM 4/15/2024    10:45 AM 4/17/2024    10:41 AM   PHQ-9 SCORE   PHQ-9 Total Score 16 19 23     Plan   - Continue TMS treatments    The service provided today was under the supervising provider of the day, Reva Hutchison MD, who was available if needed.     Vee Loja, TMS Technician  [facility]

## 2024-04-17 NOTE — PATIENT INSTRUCTIONS
Patient reports that her goals for the next two weeks include:    Continue to offer self robe and forgiveness (over and over again if needed)  Message women's group to let them know it's too much to attend during TMS but she can attend when it's over in three weeks  Continue to focus on TMS

## 2024-04-18 ENCOUNTER — VIRTUAL VISIT (OUTPATIENT)
Dept: PSYCHIATRY | Facility: CLINIC | Age: 69
End: 2024-04-18
Payer: MEDICARE

## 2024-04-18 ENCOUNTER — OFFICE VISIT (OUTPATIENT)
Dept: PSYCHIATRY | Facility: CLINIC | Age: 69
End: 2024-04-18
Payer: MEDICARE

## 2024-04-18 DIAGNOSIS — F33.2 SEVERE EPISODE OF RECURRENT MAJOR DEPRESSIVE DISORDER, WITHOUT PSYCHOTIC FEATURES (H): Primary | ICD-10-CM

## 2024-04-18 ASSESSMENT — ANXIETY QUESTIONNAIRES
7. FEELING AFRAID AS IF SOMETHING AWFUL MIGHT HAPPEN: MORE THAN HALF THE DAYS
4. TROUBLE RELAXING: NEARLY EVERY DAY
1. FEELING NERVOUS, ANXIOUS, OR ON EDGE: MORE THAN HALF THE DAYS
5. BEING SO RESTLESS THAT IT IS HARD TO SIT STILL: MORE THAN HALF THE DAYS
7. FEELING AFRAID AS IF SOMETHING AWFUL MIGHT HAPPEN: MORE THAN HALF THE DAYS
IF YOU CHECKED OFF ANY PROBLEMS ON THIS QUESTIONNAIRE, HOW DIFFICULT HAVE THESE PROBLEMS MADE IT FOR YOU TO DO YOUR WORK, TAKE CARE OF THINGS AT HOME, OR GET ALONG WITH OTHER PEOPLE: VERY DIFFICULT
IF YOU CHECKED OFF ANY PROBLEMS ON THIS QUESTIONNAIRE, HOW DIFFICULT HAVE THESE PROBLEMS MADE IT FOR YOU TO DO YOUR WORK, TAKE CARE OF THINGS AT HOME, OR GET ALONG WITH OTHER PEOPLE: VERY DIFFICULT
GAD7 TOTAL SCORE: 17
3. WORRYING TOO MUCH ABOUT DIFFERENT THINGS: NEARLY EVERY DAY
3. WORRYING TOO MUCH ABOUT DIFFERENT THINGS: NEARLY EVERY DAY
GAD7 TOTAL SCORE: 17
7. FEELING AFRAID AS IF SOMETHING AWFUL MIGHT HAPPEN: MORE THAN HALF THE DAYS
GAD7 TOTAL SCORE: 17
2. NOT BEING ABLE TO STOP OR CONTROL WORRYING: MORE THAN HALF THE DAYS
8. IF YOU CHECKED OFF ANY PROBLEMS, HOW DIFFICULT HAVE THESE MADE IT FOR YOU TO DO YOUR WORK, TAKE CARE OF THINGS AT HOME, OR GET ALONG WITH OTHER PEOPLE?: VERY DIFFICULT
1. FEELING NERVOUS, ANXIOUS, OR ON EDGE: MORE THAN HALF THE DAYS
8. IF YOU CHECKED OFF ANY PROBLEMS, HOW DIFFICULT HAVE THESE MADE IT FOR YOU TO DO YOUR WORK, TAKE CARE OF THINGS AT HOME, OR GET ALONG WITH OTHER PEOPLE?: VERY DIFFICULT
5. BEING SO RESTLESS THAT IT IS HARD TO SIT STILL: MORE THAN HALF THE DAYS
2. NOT BEING ABLE TO STOP OR CONTROL WORRYING: MORE THAN HALF THE DAYS
4. TROUBLE RELAXING: NEARLY EVERY DAY
6. BECOMING EASILY ANNOYED OR IRRITABLE: NEARLY EVERY DAY
6. BECOMING EASILY ANNOYED OR IRRITABLE: NEARLY EVERY DAY
GAD7 TOTAL SCORE: 17
7. FEELING AFRAID AS IF SOMETHING AWFUL MIGHT HAPPEN: MORE THAN HALF THE DAYS
GAD7 TOTAL SCORE: 17

## 2024-04-18 ASSESSMENT — PATIENT HEALTH QUESTIONNAIRE - PHQ9
10. IF YOU CHECKED OFF ANY PROBLEMS, HOW DIFFICULT HAVE THESE PROBLEMS MADE IT FOR YOU TO DO YOUR WORK, TAKE CARE OF THINGS AT HOME, OR GET ALONG WITH OTHER PEOPLE: VERY DIFFICULT
SUM OF ALL RESPONSES TO PHQ QUESTIONS 1-9: 20
SUM OF ALL RESPONSES TO PHQ QUESTIONS 1-9: 15
SUM OF ALL RESPONSES TO PHQ QUESTIONS 1-9: 20
10. IF YOU CHECKED OFF ANY PROBLEMS, HOW DIFFICULT HAVE THESE PROBLEMS MADE IT FOR YOU TO DO YOUR WORK, TAKE CARE OF THINGS AT HOME, OR GET ALONG WITH OTHER PEOPLE: VERY DIFFICULT
SUM OF ALL RESPONSES TO PHQ QUESTIONS 1-9: 20
SUM OF ALL RESPONSES TO PHQ QUESTIONS 1-9: 20

## 2024-04-18 NOTE — PROGRESS NOTES
"Clinician Contact & Progress Note  Department of Psychiatry & Behavioral Sciences  [facility]    Patient: Randi Cleary (1955)     MRN: [id number]  Date of Treatment:  Apr 18, 2024  Duration of Treatment: Start Time: 2:35 End Time: 3:34pm  Provider: Arsenio Jiang, Ph.D., L.P.    People present:   Provider: Arsenio Jiang, Ph.D., L.P.  Patient: Randi Cleary  Others: n/a    Mode of communication: [employer] (HIPAA compliant, secure platform). Patient consented verbally to this mode of therapy today.  Reason for telehealth:  distance from clinic    Originating Location (patient location): home, located in [place], [place]  Distant Location (provider location): Home office, located in [place], [place], using appropriate privacy considerations and procedures    Diagnoses:  Major depressive disorder, severe, recurrent, without psychotic features      Assessment (current symptoms):      4/17/2024    10:41 AM 4/18/2024    10:35 AM 4/18/2024    12:29 PM   PHQ   PHQ-9 Total Score 23 15 20    20   Q9: Thoughts of better off dead/self-harm past 2 weeks More than half the days Not at all Several days   F/U: Thoughts of suicide or self-harm   Yes   F/U: Self harm-plan   Yes   F/U: Self-harm action   No   F/U: Safety concerns   Yes         3/5/2024     3:08 PM 4/4/2024     8:52 AM 4/18/2024    12:34 PM   CHAVO-7 SCORE   Total Score 13 (moderate anxiety) 13 (moderate anxiety) 17 (severe anxiety)   Total Score 13 13    13    13    13 17    17     Regarding PHQ-9 ALERT:     Pt endorsed suicidal ideation, they denied plan or intent. They endorsed \"things stop me from going down that path\" [of intent to die by suicide]. They did endorse difficulty with intrusive suicide ideation every other day on average at \"4.5\" out of 5 intensity.     They are connected to care in [facility] clinic including regular TMS sessions as of now. They are establishing with writer for support in psychological " "therapy and also see a therapist MANUEL Hoang. They have a safety plan on file (see visit with BONITA Hoang on 4/17/24) they confirmed.      Session content:    Patient presented for initial psychotherapy visit with writer in Treatment Resistant Depression program. Began with confirmation of patient's identity with two sources of information. Writer reviewed issues of informed consent with patient, patient agreed verbally to continue with visit.    Asked to expound on their motivation for attending today's visit the patient noted: \"I wasn't making it to TMS\" and that a TMS technician suggested they engage in therapy in the clinic. Regarding TMS pt noted some improvement recognizing that they seem to think more clearly, and that they have had occasional positive feelings \"something I haven't felt in a really long time, like a release of a boulder\".     They noted continued struggle with suicidal ideation, and that they have been struggling with sleep which they also associate with mood instability (\"I'm so quick to be irritable\"). They noted having a panic attack recently which caused them to miss a TMS session.    Pt noted they have had difficulty with mental health providers at times - some losing their license for different things, and a therapist recently leaving their job abruptly which was difficult for pt. Pt stated they want to work on \"the suicide feeling, it's not all the time - but it is so uncomfortable\" as well as \"anger and rage\".    Writer suggested we start with sleep and finishing TMS as best as possible. Pt is scheduled to meet with writer in person next week (Tuesday 4/23). Will continue discussion then in person.      Treatment Plan/ Disposition:   Next appointment: Tuesday 4/23  EDGARD with other (eg., psychiatric) treatment providers       Mental Status Exam:  Alertness: alert  and oriented  Appearance: adequately groomed  Behavior/Demeanor: cooperative, pleasant, with good eye contact   Speech: " "normal  Language: intact. Preferred language identified as English.  Psychomotor: normal or unremarkable  Mood: dysphoric  Affect: full range and tearful; was congruent to mood; was congruent to content  Thought Process/Associations: difficult to follow at times  Thought Content:  unremarkable  -Suicidal ideation: reports SI, denies intent, and denies plan  -Homicidal Ideation: not endorsed  Perception:  intact  Insight: fair  Judgment: fair  Cognition: does appear grossly intact      Billing for \"Interactive Complexity\"?    No    Arsenio Jiang, PhD LP                Answers submitted by the patient for this visit:  Patient Health Questionnaire (Submitted on 4/18/2024)  If you checked off any problems, how difficult have these problems made it for you to do your work, take care of things at home, or get along with other people?: Very difficult  PHQ9 TOTAL SCORE: 20  CHAVO-7 (Submitted on 4/18/2024)  CHAVO 7 TOTAL SCORE: 17    "

## 2024-04-18 NOTE — PROGRESS NOTES
" Interventional Psychiatry Program  [address], [address]  [place] [address]  TMS Procedure Note   Randi Cleary MRN# [id number]  Age: 68 year old    YOB: 1955    Winnie Cleary comes into clinic today at the request of, Arnaldo Varela MD, ordering provider for TMS treatments.     Pre-Procedure:  History and Physical: Reviewed in medical record  Consent signed by: Randi Cleary for this course of treatment on: 3/6/2024      Clinical Narrative:  Patient tolerating treatment. Patient is looking forward to seeing a new therapist today.    Indications for TMS:  MDD, recurrent, severe; 4+ medication trials (from 2+ classes) ineffective; Psychotherapy ineffective     Procedure Diagnosis:  MDD, severe, recurrent, F33.2    Treatment Hx:  Treatment number this series: 29  Total lifetime treatment number: 29    Allergies   Allergen Reactions    Serotonin Reuptake Inhibitors (Ssris) Anxiety, Difficulty breathing, Headache, Palpitations and Shortness Of Breath    Buspirone      The patient states she had serotonin syndrome    Cephalexin      Other reaction(s): unknown rxn.    Desvenlafaxine      Serotonin syndrome    Diclofenac Sodium [Diclofenac]      Serotonin syndrome and restless legs syndrome    Gabapentin      Drove on the wrong side of the highway    Levofloxacin      \"CAN'T REMEMBER\"    Penicillins      \"SORES IN MOUTH\"    Riluzole Difficulty breathing and Swelling    Sulfa Antibiotics      \"PT DOES NOT KNOW WHAT THE REACTION WAS\"    Topiramate Other (See Comments)     Frequent urination      LMP  (LMP Unknown)     Pause for the Cause  Right patient: Yes  Right procedure/correct coil:  Yes; rTMS; [id number]; H1 coil.   Earplugs in place:  Yes    Procedure  Patient was seated in procedure chair. Identity and procedure was verified. Ear plugs were placed in ears and patient-specific cap was placed on head and tightened appropriately. Ruler locations were verified. Bone conducting " headphones were not used.  Coil was placed at 0 - eyebrow - 14 and stimulator was set to parameters below.  Initial train was well tolerated so 55 trains were delivered.  Patient tolerated procedure well with minimal right eyebrow movement.    Motor Threshold Determination  Distance from nasion to inion: 35.5cm  MT 1: 0 - 7 - 14 @ 61% on 03/06/2024    Standard LDLPFC  Frequency: 18  Train Duration: 2  Total pulses delivered: 1980  Inter-train interval: 20  Tx Loc: 0 - eyebrows - 14    Energy: 73% (120%MT)  Trains: 55   *Use spacer*    Date MADRS QIDS PHQ-9   03/06/2024 33 18 21   3/15/2024  13 15   3/22/24  16 18   3/29/24  16 8   4/5/24  18 11   4/12/24  16 16                   4/15/2024    10:45 AM 4/17/2024    10:41 AM 4/18/2024    10:35 AM   PHQ-9 SCORE   PHQ-9 Total Score 19 23 15     Plan   - Continue TMS treatments    The service provided today was under the supervising provider of the day, DHRUV Warren MD, who was available if needed.     Vee Loja, TMS Technician  [facility]

## 2024-04-19 ENCOUNTER — OFFICE VISIT (OUTPATIENT)
Dept: PSYCHIATRY | Facility: CLINIC | Age: 69
End: 2024-04-19
Payer: MEDICARE

## 2024-04-19 DIAGNOSIS — F33.2 SEVERE EPISODE OF RECURRENT MAJOR DEPRESSIVE DISORDER, WITHOUT PSYCHOTIC FEATURES (H): Primary | ICD-10-CM

## 2024-04-19 NOTE — PROGRESS NOTES
" Interventional Psychiatry Program  [address], [address]  [place] [address]  TMS Procedure Note   Randi Cleary MRN# [id number]  Age: 68 year old    YOB: 1955    Winnie Cleary comes into clinic today at the request of, Arnaldo Varela MD, ordering provider for TMS treatments.     Pre-Procedure:  History and Physical: Reviewed in medical record  Consent signed by: Randi Cleary for this course of treatment on: 3/6/2024      Clinical Narrative:  Patient tolerating treatment. Patient is very happy with how their appointment with their new therapist went yesterday.    Indications for TMS:  MDD, recurrent, severe; 4+ medication trials (from 2+ classes) ineffective; Psychotherapy ineffective     Procedure Diagnosis:  MDD, severe, recurrent, F33.2    Treatment Hx:  Treatment number this series: 30  Total lifetime treatment number: 30    Allergies   Allergen Reactions    Serotonin Reuptake Inhibitors (Ssris) Anxiety, Difficulty breathing, Headache, Palpitations and Shortness Of Breath    Buspirone      The patient states she had serotonin syndrome    Cephalexin      Other reaction(s): unknown rxn.    Desvenlafaxine      Serotonin syndrome    Diclofenac Sodium [Diclofenac]      Serotonin syndrome and restless legs syndrome    Gabapentin      Drove on the wrong side of the highway    Levofloxacin      \"CAN'T REMEMBER\"    Penicillins      \"SORES IN MOUTH\"    Riluzole Difficulty breathing and Swelling    Sulfa Antibiotics      \"PT DOES NOT KNOW WHAT THE REACTION WAS\"    Topiramate Other (See Comments)     Frequent urination      LMP  (LMP Unknown)     Pause for the Cause  Right patient: Yes  Right procedure/correct coil:  Yes; rTMS; [id number]; H1 coil.   Earplugs in place:  Yes    Procedure  Patient was seated in procedure chair. Identity and procedure was verified. Ear plugs were placed in ears and patient-specific cap was placed on head and tightened appropriately. Ruler locations were " verified. Bone conducting headphones were not used.  Coil was placed at 0 - eyebrow - 14 and stimulator was set to parameters below.  Initial train was well tolerated so 55 trains were delivered.  Patient tolerated procedure well with minimal right eyebrow movement.    Motor Threshold Determination  Distance from nasion to inion: 35.5cm  MT 1: 0 - 7 - 14 @ 61% on 03/06/2024    Standard LDLPFC  Frequency: 18  Train Duration: 2  Total pulses delivered: 1980  Inter-train interval: 20  Tx Loc: 0 - eyebrows - 14    Energy: 73% (120%MT)  Trains: 55   *Use spacer*    Date MADRS QIDS PHQ-9   03/06/2024 33 18 21   3/15/2024  13 15   3/22/24  16 18   3/29/24  16 8   4/5/24  18 11   4/12/24  16 16   4/19/24  19 15             4/18/2024    10:35 AM 4/18/2024    12:29 PM 4/19/2024    10:41 AM   PHQ-9 SCORE   PHQ-9 Total Score MyChart  20 (Severe depression)    PHQ-9 Total Score 15 20    20 15     Plan   - Continue TMS treatments    The service provided today was under the supervising provider of the day, Reva Hutchison MD, who was available if needed.     Vee Loja, TMS Technician  [facility]

## 2024-04-22 ENCOUNTER — MYC MEDICAL ADVICE (OUTPATIENT)
Dept: CARDIOLOGY | Facility: CLINIC | Age: 69
End: 2024-04-22
Payer: MEDICARE

## 2024-04-22 ENCOUNTER — OFFICE VISIT (OUTPATIENT)
Dept: PSYCHIATRY | Facility: CLINIC | Age: 69
End: 2024-04-22
Payer: MEDICARE

## 2024-04-22 ENCOUNTER — TELEPHONE (OUTPATIENT)
Dept: CARDIOLOGY | Facility: CLINIC | Age: 69
End: 2024-04-22
Payer: MEDICARE

## 2024-04-22 DIAGNOSIS — F33.2 SEVERE EPISODE OF RECURRENT MAJOR DEPRESSIVE DISORDER, WITHOUT PSYCHOTIC FEATURES (H): Primary | ICD-10-CM

## 2024-04-22 ASSESSMENT — PATIENT HEALTH QUESTIONNAIRE - PHQ9
SUM OF ALL RESPONSES TO PHQ QUESTIONS 1-9: 17
10. IF YOU CHECKED OFF ANY PROBLEMS, HOW DIFFICULT HAVE THESE PROBLEMS MADE IT FOR YOU TO DO YOUR WORK, TAKE CARE OF THINGS AT HOME, OR GET ALONG WITH OTHER PEOPLE: VERY DIFFICULT
SUM OF ALL RESPONSES TO PHQ QUESTIONS 1-9: 17
10. IF YOU CHECKED OFF ANY PROBLEMS, HOW DIFFICULT HAVE THESE PROBLEMS MADE IT FOR YOU TO DO YOUR WORK, TAKE CARE OF THINGS AT HOME, OR GET ALONG WITH OTHER PEOPLE: VERY DIFFICULT
10. IF YOU CHECKED OFF ANY PROBLEMS, HOW DIFFICULT HAVE THESE PROBLEMS MADE IT FOR YOU TO DO YOUR WORK, TAKE CARE OF THINGS AT HOME, OR GET ALONG WITH OTHER PEOPLE: VERY DIFFICULT
SUM OF ALL RESPONSES TO PHQ QUESTIONS 1-9: 17

## 2024-04-22 NOTE — TELEPHONE ENCOUNTER
4/22/2024 12:16PM Zandra Federico  Left Voicemail (1st Attempt), Sent Mychart (1st Attempt), and Patient Contacted Pt's spouse, Justin contacted for the patient to call back and schedule the following:    Appointment type: RHC  Provider: SONIYA  Return date: May 2024  Specialty phone number: [phone] option 1  Additional appointment(s) needed: [facility] video visit w/ Arlyn Stern after  Additonal Notes: 4/22 LVM on Winnie's phone and told Justin (Winnie's spouse) and MIGNON that we were calling to schedule some appts. Winnie needs RHC and a video visit follow-up w/ Arlyn Stern after. VLADIMIR Caballero 4/22/2024 12:16PM

## 2024-04-22 NOTE — PROGRESS NOTES
" Interventional Psychiatry [facility]  [address], [address]  [place] [address]  TMS Procedure Note   Randi Cleary MRN# [id number]  Age: 68 year old    YOB: 1955    Winnie Cleary comes into clinic today at the request of, Arnaldo Varela MD, ordering provider for TMS treatments.     Pre-Procedure:  History and Physical: Reviewed in medical record  Consent signed by: Randi Cleary for this course of treatment on: 3/6/2024      Clinical Narrative:  Patient tolerating treatment. Patient had \"one good day\" this weekend.     Indications for TMS:  MDD, recurrent, severe; 4+ medication trials (from 2+ classes) ineffective; Psychotherapy ineffective     Procedure Diagnosis:  MDD, severe, recurrent, F33.2    Treatment Hx:  Treatment number this series: 31  Total lifetime treatment number: 31    Allergies   Allergen Reactions    Serotonin Reuptake Inhibitors (Ssris) Anxiety, Difficulty breathing, Headache, Palpitations and Shortness Of Breath    Buspirone      The patient states she had serotonin syndrome    Cephalexin      Other reaction(s): unknown rxn.    Desvenlafaxine      Serotonin syndrome    Diclofenac Sodium [Diclofenac]      Serotonin syndrome and restless legs syndrome    Gabapentin      Drove on the wrong side of the highway    Levofloxacin      \"CAN'T REMEMBER\"    Penicillins      \"SORES IN MOUTH\"    Riluzole Difficulty breathing and Swelling    Sulfa Antibiotics      \"PT DOES NOT KNOW WHAT THE REACTION WAS\"    Topiramate Other (See Comments)     Frequent urination      LMP  (LMP Unknown)     Pause for the Cause  Right patient: Yes  Right procedure/correct coil:  Yes; rTMS; [id number]; H1 coil.   Earplugs in place:  Yes    Procedure  Patient was seated in procedure chair. Identity and procedure was verified. Ear plugs were placed in ears and patient-specific cap was placed on head and tightened appropriately. Ruler locations were verified. Bone conducting headphones were not " used.  Coil was placed at 0 - eyebrow - 14 and stimulator was set to parameters below.  Initial train was well tolerated so 55 trains were delivered.  Patient tolerated procedure well with minimal right eyebrow movement.    Motor Threshold Determination  Distance from nasion to inion: 35.5cm  MT 1: 0 - 7 - 14 @ 61% on 03/06/2024    Standard LDLPFC  Frequency: 18  Train Duration: 2  Total pulses delivered: 1980  Inter-train interval: 20  Tx Loc: 0 - eyebrows - 14    Energy: 73% (120%MT)  Trains: 55   *Use spacer*    Date MADRS QIDS PHQ-9   03/06/2024 33 18 21   3/15/2024  13 15   3/22/24  16 18   3/29/24  16 8   4/5/24  18 11   4/12/24  16 16   4/19/24  19 15             4/18/2024    12:29 PM 4/19/2024    10:41 AM 4/22/2024     9:13 AM   PHQ-9 SCORE   PHQ-9 Total Score MyChart 20 (Severe depression)  17 (Moderately severe depression)   PHQ-9 Total Score 20    20 15 17    17    17     Plan   - Continue TMS treatments    The service provided today was under the supervising provider of the day, Reva Hutchison MD, who was available if needed.     Vee Loja, TMS Technician  [facility]

## 2024-04-22 NOTE — TELEPHONE ENCOUNTER
----- Message from Miki Richardson RN sent at 4/19/2024  4:06 PM CDT -----  Regarding: RE: Sched help please!  How about if we do the RHC and a follow-up with Leena maddox.  That will be the best option in this case.    Thank you,    Miki  ----- Message -----  From: Zandra Caballero  Sent: 4/19/2024   3:19 PM CDT  To: Miki Richardson RN  Subject: RE: Sched help please!                           Dr. Edwards has an 8AM on 6/4 which wouldn't work if we wanted everything on the same day, but might work if the pt is ok splitting appts up. Otherwise, next available w/ Dr. Edwards would be 7/2. Do you have anything open sooner- maybe in the afternoon if the pt would want to schedule both the same day?    Zandra Caballero  ----- Message -----  From: Miki Richardson RN  Sent: 4/18/2024  12:35 PM CDT  To: [facility]  Subject: Sched help please!                               Team,    Patient will need a RHC and in person F/U with Dr. Edwards in May.    Orders are in.Thanks!     Thank you!    Miki Richardson RN

## 2024-04-23 ENCOUNTER — OFFICE VISIT (OUTPATIENT)
Dept: PSYCHIATRY | Facility: CLINIC | Age: 69
End: 2024-04-23
Payer: MEDICARE

## 2024-04-23 DIAGNOSIS — F33.2 SEVERE EPISODE OF RECURRENT MAJOR DEPRESSIVE DISORDER, WITHOUT PSYCHOTIC FEATURES (H): Primary | ICD-10-CM

## 2024-04-23 NOTE — PROGRESS NOTES
" Interventional Psychiatry Program  [address], [address]  [place] [address]  TMS Procedure Note   Randi Cleary MRN# [id number]  Age: 68 year old    YOB: 1955    Winnie Cleary comes into clinic today at the request of, Arnaldo Varela MD, ordering provider for TMS treatments.     Pre-Procedure:  History and Physical: Reviewed in medical record  Consent signed by: Randi Cleary for this course of treatment on: 3/6/2024      Clinical Narrative:  Patient tolerating treatment. Found a few options for new shoes yesterday. Going after treatment to get a pair.    Indications for TMS:  MDD, recurrent, severe; 4+ medication trials (from 2+ classes) ineffective; Psychotherapy ineffective     Procedure Diagnosis:  MDD, severe, recurrent, F33.2    Treatment Hx:  Treatment number this series: 32  Total lifetime treatment number: 32    Allergies   Allergen Reactions    Serotonin Reuptake Inhibitors (Ssris) Anxiety, Difficulty breathing, Headache, Palpitations and Shortness Of Breath    Buspirone      The patient states she had serotonin syndrome    Cephalexin      Other reaction(s): unknown rxn.    Desvenlafaxine      Serotonin syndrome    Diclofenac Sodium [Diclofenac]      Serotonin syndrome and restless legs syndrome    Gabapentin      Drove on the wrong side of the highway    Levofloxacin      \"CAN'T REMEMBER\"    Penicillins      \"SORES IN MOUTH\"    Riluzole Difficulty breathing and Swelling    Sulfa Antibiotics      \"PT DOES NOT KNOW WHAT THE REACTION WAS\"    Topiramate Other (See Comments)     Frequent urination      LMP  (LMP Unknown)     Pause for the Cause  Right patient: Yes  Right procedure/correct coil:  Yes; rTMS; [id number]; H1 coil.   Earplugs in place:  Yes    Procedure  Patient was seated in procedure chair. Identity and procedure was verified. Ear plugs were placed in ears and patient-specific cap was placed on head and tightened appropriately. Ruler locations were verified. " Bone conducting headphones were not used.  Coil was placed at 0 - eyebrow - 14 and stimulator was set to parameters below.  Initial train was well tolerated so 55 trains were delivered.  Patient tolerated procedure well with minimal right eyebrow movement.    Motor Threshold Determination  Distance from nasion to inion: 35.5cm  MT 1: 0 - 7 - 14 @ 61% on 03/06/2024    Standard LDLPFC  Frequency: 18  Train Duration: 2  Total pulses delivered: 1980  Inter-train interval: 20  Tx Loc: 0 - eyebrows - 14    Energy: 73% (120%MT)  Trains: 55   *Use spacer*    Date MADRS QIDS PHQ-9   03/06/2024 33 18 21   3/15/2024  13 15   3/22/24  16 18   3/29/24  16 8   4/5/24  18 11   4/12/24  16 16   4/19/24  19 15             4/18/2024    12:29 PM 4/19/2024    10:41 AM 4/22/2024     9:13 AM   PHQ-9 SCORE   PHQ-9 Total Score MyChart 20 (Severe depression)  17 (Moderately severe depression)   PHQ-9 Total Score 20    20 15 17    17    17     Plan   - Continue TMS treatments    The service provided today was under the supervising provider of the day, Arnaldo Varela MD, who was available if needed.     Aparna Colindres, TMS Technician  [facility]

## 2024-04-24 ENCOUNTER — OFFICE VISIT (OUTPATIENT)
Dept: PSYCHIATRY | Facility: CLINIC | Age: 69
End: 2024-04-24
Payer: MEDICARE

## 2024-04-24 DIAGNOSIS — F33.2 SEVERE EPISODE OF RECURRENT MAJOR DEPRESSIVE DISORDER, WITHOUT PSYCHOTIC FEATURES (H): Primary | ICD-10-CM

## 2024-04-24 ASSESSMENT — PATIENT HEALTH QUESTIONNAIRE - PHQ9
10. IF YOU CHECKED OFF ANY PROBLEMS, HOW DIFFICULT HAVE THESE PROBLEMS MADE IT FOR YOU TO DO YOUR WORK, TAKE CARE OF THINGS AT HOME, OR GET ALONG WITH OTHER PEOPLE: SOMEWHAT DIFFICULT
SUM OF ALL RESPONSES TO PHQ QUESTIONS 1-9: 13
10. IF YOU CHECKED OFF ANY PROBLEMS, HOW DIFFICULT HAVE THESE PROBLEMS MADE IT FOR YOU TO DO YOUR WORK, TAKE CARE OF THINGS AT HOME, OR GET ALONG WITH OTHER PEOPLE: SOMEWHAT DIFFICULT

## 2024-04-24 NOTE — PROGRESS NOTES
" Interventional Psychiatry Program  [address], [address]  [place] [address]  TMS Procedure Note   Randi Cleary MRN# [id number]  Age: 68 year old    YOB: 1955    Winnie Cleary comes into clinic today at the request of, Arnaldo Varela MD, ordering provider for TMS treatments.     Pre-Procedure:  History and Physical: Reviewed in medical record  Consent signed by: Randi Cleary for this course of treatment on: 3/6/2024      Clinical Narrative:  Patient tolerating treatment. Patient wants to start group after their treatment is over.     Indications for TMS:  MDD, recurrent, severe; 4+ medication trials (from 2+ classes) ineffective; Psychotherapy ineffective     Procedure Diagnosis:  MDD, severe, recurrent, F33.2    Treatment Hx:  Treatment number this series: 33  Total lifetime treatment number: 33    Allergies   Allergen Reactions    Serotonin Reuptake Inhibitors (Ssris) Anxiety, Difficulty breathing, Headache, Palpitations and Shortness Of Breath    Buspirone      The patient states she had serotonin syndrome    Cephalexin      Other reaction(s): unknown rxn.    Desvenlafaxine      Serotonin syndrome    Diclofenac Sodium [Diclofenac]      Serotonin syndrome and restless legs syndrome    Gabapentin      Drove on the wrong side of the highway    Levofloxacin      \"CAN'T REMEMBER\"    Penicillins      \"SORES IN MOUTH\"    Riluzole Difficulty breathing and Swelling    Sulfa Antibiotics      \"PT DOES NOT KNOW WHAT THE REACTION WAS\"    Topiramate Other (See Comments)     Frequent urination      LMP  (LMP Unknown)     Pause for the Cause  Right patient: Yes  Right procedure/correct coil:  Yes; rTMS; [id number]; H1 coil.   Earplugs in place:  Yes    Procedure  Patient was seated in procedure chair. Identity and procedure was verified. Ear plugs were placed in ears and patient-specific cap was placed on head and tightened appropriately. Ruler locations were verified. Bone conducting " headphones were not used.  Coil was placed at 0 - eyebrow - 14 and stimulator was set to parameters below.  Initial train was well tolerated so 55 trains were delivered.  Patient tolerated procedure well with minimal right eyebrow movement.    Motor Threshold Determination  Distance from nasion to inion: 35.5cm  MT 1: 0 - 7 - 14 @ 61% on 03/06/2024    Standard LDLPFC  Frequency: 18  Train Duration: 2  Total pulses delivered: 1980  Inter-train interval: 20  Tx Loc: 0 - eyebrows - 14    Energy: 73% (120%MT)  Trains: 55   *Use spacer*    Date MADRS QIDS PHQ-9   03/06/2024 33 18 21   3/15/2024  13 15   3/22/24  16 18   3/29/24  16 8   4/5/24  18 11   4/12/24  16 16   4/19/24  19 15             4/19/2024    10:41 AM 4/22/2024     9:13 AM 4/24/2024     8:51 AM   PHQ-9 SCORE   PHQ-9 Total Score MyChart  17 (Moderately severe depression) 13 (Moderate depression)   PHQ-9 Total Score 15 17    17    17 13    13     Plan   - Continue TMS treatments    The service provided today was under the supervising provider of the day, Reva Hutchison MD, who was available if needed.     Vee Loja, TMS Technician  [facility]

## 2024-04-24 NOTE — PROGRESS NOTES
"Clinician Contact & Progress Note  Department of Psychiatry & Behavioral Sciences  [facility]    Patient: Randi Cleary (1955)     MRN: [id number]  Date of Treatment:  Apr 23, 2024  Duration of Treatment: Start Time: 9:32am End Time: 10:34am  Provider: Arsenio Jiang, Ph.D., L.P.    People present:   Provider: Arsenio Jiang, Ph.D., L.P.  Patient: Randi Cleary  Others: n/a    Mode of communication: in clinic    Diagnoses:  Major depressive disorder, severe, recurrent, without psychotic features      Assessment (current symptoms):      4/19/2024    10:41 AM 4/22/2024     9:13 AM 4/24/2024     8:51 AM   PHQ   PHQ-9 Total Score 15 17    17    17 13    13   Q9: Thoughts of better off dead/self-harm past 2 weeks Several days Several days Several days   F/U: Thoughts of suicide or self-harm  Yes Yes   F/U: Self harm-plan  Yes No   F/U: Self-harm action  No No   F/U: Safety concerns  No No         3/5/2024     3:08 PM 4/4/2024     8:52 AM 4/18/2024    12:34 PM   CHAVO-7 SCORE   Total Score 13 (moderate anxiety) 13 (moderate anxiety) 17 (severe anxiety)   Total Score 13 13    13    13    13 17    17     Regarding PHQ-9 ALERT:     Pt endorsed suicidal ideation, they denied plan or intent. They endorsed \"things stop me from going down that path\" [of intent to die by suicide]. They did endorse continued difficulty with intrusive suicide ideation which is a target of treatment.    They are connected to care in [facility] clinic including regular TMS sessions as of now. They have established for individual psychotherapy with MANUEL Hoang. They have a safety plan on file (see visit with BONITA Hoang on 4/17/24) which they have confirmed.      Session content:    Patient presented for follow-up psychotherapy visit with writer in Treatment Resistant Depression program. Pt is scheduled to complete TMS treatments on May 1st. They were referred for consultation by TMS team after some difficulty making it to TMS " "appointments.    At our last meeting writer asked pt to track their sleep until this in-person visit. Pt largely did not - but they were able to reflect on a typical experience of sleep during the day and night. In conversation writer cleveland a visual depiction of pt's current sleep routine (per their report) and gave psychoeducation on the benefit of consolidating sleep.     Pt described sleeping in four sections of at most three hours between 3pm and 6am, with long stretches of time awake in between. We talked about how this is complicated by potential KYAW as well as self-reported restless leg symtpoms and neck pain.    Writer let pt know that it is writer's recommendation that they continue to meet with BONITA Hoang, Marriage and Family Therapist whom they have established a therapeutic relationship with. Writer will reach out to coordinate care with Ms. Viktor and let pt know they will get a call from writer this week.        Treatment Plan/ Disposition:   EDGARD with other (eg., psychiatric) treatment providers       Mental Status Exam:  Alertness: alert  and oriented  Appearance: adequately groomed  Behavior/Demeanor: cooperative, pleasant, with good eye contact   Speech: normal  Language: intact. Preferred language identified as English.  Psychomotor: normal or unremarkable  Mood: dysphoric  Affect: full range and tearful; was congruent to mood; was congruent to content  Thought Process/Associations: difficult to follow at times  Thought Content:  unremarkable  -Suicidal ideation: reports SI, denies intent, and denies plan  -Homicidal Ideation: not endorsed  Perception:  intact  Insight: fair  Judgment: fair  Cognition: does appear grossly intact      Billing for \"Interactive Complexity\"?    No    Arsenio Jiang, PhD LP        Answers submitted by the patient for this visit:  Patient Health Questionnaire (Submitted on 4/22/2024)  If you checked off any problems, how difficult have these problems made it for you to do " your work, take care of things at home, or get along with other people?: Very difficult  PHQ9 TOTAL SCORE: 17  CHAVO-7 (Submitted on 4/18/2024)  CHAVO 7 TOTAL SCORE: 17

## 2024-04-25 ENCOUNTER — OFFICE VISIT (OUTPATIENT)
Dept: PSYCHIATRY | Facility: CLINIC | Age: 69
End: 2024-04-25
Payer: MEDICARE

## 2024-04-25 DIAGNOSIS — G25.81 RESTLESS LEGS SYNDROME (RLS): ICD-10-CM

## 2024-04-25 DIAGNOSIS — F33.2 SEVERE EPISODE OF RECURRENT MAJOR DEPRESSIVE DISORDER, WITHOUT PSYCHOTIC FEATURES (H): Primary | ICD-10-CM

## 2024-04-25 RX ORDER — ROPINIROLE 2 MG/1
TABLET, FILM COATED ORAL
Qty: 90 TABLET | Refills: 3 | Status: SHIPPED | OUTPATIENT
Start: 2024-04-25 | End: 2024-07-25

## 2024-04-25 NOTE — TELEPHONE ENCOUNTER
[facility] Call Center    Phone Message    May a detailed message be left on voicemail: yes     Reason for Call: Medication Refill Request    Has the patient contacted the pharmacy for the refill? Yes   Name of medication being requested:   rOPINIRole (REQUIP) 2 MG tablet       Provider who prescribed the medication:   Dusty Dubois MD       Pharmacy:   [id number] - [facility] [place] [address]     Date medication is needed: ASAP   Pt is out of this medication    Action Taken: Message routed to:  Other: [facility]    Travel Screening: Not Applicable

## 2024-04-25 NOTE — PROGRESS NOTES
" Interventional Psychiatry [facility]  [address], [address]  [place] [address]  TMS Procedure Note   Randi Cleary MRN# [id number]  Age: 68 year old    YOB: 1955    Winnie Cleary comes into clinic today at the request of, Arnaldo Varela MD, ordering provider for TMS treatments.     Pre-Procedure:  History and Physical: Reviewed in medical record  Consent signed by: Randi Cleary for this course of treatment on: 3/6/2024      Clinical Narrative:  Patient tolerating treatment.     Indications for TMS:  MDD, recurrent, severe; 4+ medication trials (from 2+ classes) ineffective; Psychotherapy ineffective     Procedure Diagnosis:  MDD, severe, recurrent, F33.2    Treatment Hx:  Treatment number this series: 34  Total lifetime treatment number: 34    Allergies   Allergen Reactions    Serotonin Reuptake Inhibitors (Ssris) Anxiety, Difficulty breathing, Headache, Palpitations and Shortness Of Breath    Buspirone      The patient states she had serotonin syndrome    Cephalexin      Other reaction(s): unknown rxn.    Desvenlafaxine      Serotonin syndrome    Diclofenac Sodium [Diclofenac]      Serotonin syndrome and restless legs syndrome    Gabapentin      Drove on the wrong side of the highway    Levofloxacin      \"CAN'T REMEMBER\"    Penicillins      \"SORES IN MOUTH\"    Riluzole Difficulty breathing and Swelling    Sulfa Antibiotics      \"PT DOES NOT KNOW WHAT THE REACTION WAS\"    Topiramate Other (See Comments)     Frequent urination      LMP  (LMP Unknown)     Pause for the Cause  Right patient: Yes  Right procedure/correct coil:  Yes; rTMS; [id number]; H1 coil.   Earplugs in place:  Yes    Procedure  Patient was seated in procedure chair. Identity and procedure was verified. Ear plugs were placed in ears and patient-specific cap was placed on head and tightened appropriately. Ruler locations were verified. Bone conducting headphones were not used.  Coil was placed at 0 - eyebrow - 14 " and stimulator was set to parameters below.  Initial train was well tolerated so 55 trains were delivered.  Patient tolerated procedure well with minimal right eyebrow movement.    Motor Threshold Determination  Distance from nasion to inion: 35.5cm  MT 1: 0 - 7 - 14 @ 61% on 03/06/2024    Standard LDLPFC  Frequency: 18  Train Duration: 2  Total pulses delivered: 1980  Inter-train interval: 20  Tx Loc: 0 - eyebrows - 14    Energy: 73% (120%MT)  Trains: 55   *Use spacer*    Date MADRS QIDS PHQ-9   03/06/2024 33 18 21   3/15/2024  13 15   3/22/24  16 18   3/29/24  16 8   4/5/24  18 11   4/12/24  16 16   4/19/24  19 15             4/22/2024     9:13 AM 4/24/2024     8:51 AM 4/25/2024    10:43 AM   PHQ-9 SCORE   PHQ-9 Total Score MyChart 17 (Moderately severe depression) 13 (Moderate depression)    PHQ-9 Total Score 17    17    17 13    13 10     Plan   - Continue TMS treatments    The service provided today was under the supervising provider of the day, DHRUV Warren MD, who was available if needed.     Aparna Colindres, TMS Technician  [facility]

## 2024-04-25 NOTE — TELEPHONE ENCOUNTER
Received call from patient  requesting refill(s) for   rOPINIRole (REQUIP) 2 MG tablet    Last refilled on: Mar. 23, 2024       Patient last seen on: Mar. 20, 2024  Next appt scheduled for: Aug. 1, 2024     E-prescribe to:     [id number] [place] [address]    Will facilitate refill.      Jayde Gustafson, Clinic Facilitator  [facility]

## 2024-04-26 ENCOUNTER — OFFICE VISIT (OUTPATIENT)
Dept: PSYCHIATRY | Facility: CLINIC | Age: 69
End: 2024-04-26
Payer: MEDICARE

## 2024-04-26 DIAGNOSIS — F33.2 SEVERE EPISODE OF RECURRENT MAJOR DEPRESSIVE DISORDER, WITHOUT PSYCHOTIC FEATURES (H): Primary | ICD-10-CM

## 2024-04-26 ASSESSMENT — PATIENT HEALTH QUESTIONNAIRE - PHQ9: SUM OF ALL RESPONSES TO PHQ QUESTIONS 1-9: 13

## 2024-04-26 NOTE — PROGRESS NOTES
" Interventional Psychiatry Program  [address], [address]  [place] [address]  TMS Procedure Note   Randi Cleary MRN# [id number]  Age: 68 year old    YOB: 1955    Winnie Cleary comes into clinic today at the request of, Arnaldo Varela MD, ordering provider for TMS treatments.     Pre-Procedure:  History and Physical: Reviewed in medical record  Consent signed by: Randi Cleary for this course of treatment on: 3/6/2024      Clinical Narrative:  Patient tolerating treatment. Patient is trying to figure out her new shoes.     Indications for TMS:  MDD, recurrent, severe; 4+ medication trials (from 2+ classes) ineffective; Psychotherapy ineffective     Procedure Diagnosis:  MDD, severe, recurrent, F33.2    Treatment Hx:  Treatment number this series: 35  Total lifetime treatment number: 35    Allergies   Allergen Reactions    Serotonin Reuptake Inhibitors (Ssris) Anxiety, Difficulty breathing, Headache, Palpitations and Shortness Of Breath    Buspirone      The patient states she had serotonin syndrome    Cephalexin      Other reaction(s): unknown rxn.    Desvenlafaxine      Serotonin syndrome    Diclofenac Sodium [Diclofenac]      Serotonin syndrome and restless legs syndrome    Gabapentin      Drove on the wrong side of the highway    Levofloxacin      \"CAN'T REMEMBER\"    Penicillins      \"SORES IN MOUTH\"    Riluzole Difficulty breathing and Swelling    Sulfa Antibiotics      \"PT DOES NOT KNOW WHAT THE REACTION WAS\"    Topiramate Other (See Comments)     Frequent urination      LMP  (LMP Unknown)     Pause for the Cause  Right patient: Yes  Right procedure/correct coil:  Yes; rTMS; [id number]; H1 coil.   Earplugs in place:  Yes    Procedure  Patient was seated in procedure chair. Identity and procedure was verified. Ear plugs were placed in ears and patient-specific cap was placed on head and tightened appropriately. Ruler locations were verified. Bone conducting headphones were " not used.  Coil was placed at 0 - eyebrow - 14 and stimulator was set to parameters below.  Initial train was well tolerated so 55 trains were delivered.  Patient tolerated procedure well with minimal right eyebrow movement.    Motor Threshold Determination  Distance from nasion to inion: 35.5cm  MT 1: 0 - 7 - 14 @ 61% on 03/06/2024    Standard LDLPFC  Frequency: 18  Train Duration: 2  Total pulses delivered: 1980  Inter-train interval: 20  Tx Loc: 0 - eyebrows - 14    Energy: 73% (120%MT)  Trains: 55   *Use spacer*    Date MADRS QIDS PHQ-9   03/06/2024 33 18 21   3/15/2024  13 15   3/22/24  16 18   3/29/24  16 8   4/5/24  18 11   4/12/24  16 16   4/19/24  19 15   4/26/24  14 13             4/24/2024     8:51 AM 4/25/2024    10:43 AM 4/26/2024    10:38 AM   PHQ-9 SCORE   PHQ-9 Total Score MyChart 13 (Moderate depression)     PHQ-9 Total Score 13    13 10 13     Plan   - Continue TMS treatments    The service provided today was under the supervising provider of the day, Reva Hutchison MD, who was available if needed.     Vee Loja, TMS Technician  [facility]  [facility]

## 2024-04-29 ENCOUNTER — OFFICE VISIT (OUTPATIENT)
Dept: PSYCHIATRY | Facility: CLINIC | Age: 69
End: 2024-04-29
Payer: MEDICARE

## 2024-04-29 DIAGNOSIS — F33.2 SEVERE EPISODE OF RECURRENT MAJOR DEPRESSIVE DISORDER, WITHOUT PSYCHOTIC FEATURES (H): Primary | ICD-10-CM

## 2024-04-29 ASSESSMENT — PATIENT HEALTH QUESTIONNAIRE - PHQ9: SUM OF ALL RESPONSES TO PHQ QUESTIONS 1-9: 11

## 2024-04-29 NOTE — PROGRESS NOTES
" Interventional Psychiatry Program  [address], [address]  [place] [address]  TMS Procedure Note   Randi Cleary MRN# [id number]  Age: 68 year old    YOB: 1955    Winnie Cleary comes into clinic today at the request of, Arnaldo Varela MD, ordering provider for TMS treatments.     Pre-Procedure:  History and Physical: Reviewed in medical record  Consent signed by: Randi Cleary for this course of treatment on: 3/6/2024      Clinical Narrative:  Patient tolerating treatment. Today was patient's last day of treatment.     Indications for TMS:  MDD, recurrent, severe; 4+ medication trials (from 2+ classes) ineffective; Psychotherapy ineffective     Procedure Diagnosis:  MDD, severe, recurrent, F33.2    Treatment Hx:  Treatment number this series: 36  Total lifetime treatment number: 36    Allergies   Allergen Reactions    Serotonin Reuptake Inhibitors (Ssris) Anxiety, Difficulty breathing, Headache, Palpitations and Shortness Of Breath    Buspirone      The patient states she had serotonin syndrome    Cephalexin      Other reaction(s): unknown rxn.    Desvenlafaxine      Serotonin syndrome    Diclofenac Sodium [Diclofenac]      Serotonin syndrome and restless legs syndrome    Gabapentin      Drove on the wrong side of the highway    Levofloxacin      \"CAN'T REMEMBER\"    Penicillins      \"SORES IN MOUTH\"    Riluzole Difficulty breathing and Swelling    Sulfa Antibiotics      \"PT DOES NOT KNOW WHAT THE REACTION WAS\"    Topiramate Other (See Comments)     Frequent urination      LMP  (LMP Unknown)     Pause for the Cause  Right patient: Yes  Right procedure/correct coil:  Yes; rTMS; [id number]; H1 coil.   Earplugs in place:  Yes    Procedure  Patient was seated in procedure chair. Identity and procedure was verified. Ear plugs were placed in ears and patient-specific cap was placed on head and tightened appropriately. Ruler locations were verified. Bone conducting headphones were not " used.  Coil was placed at 0 - eyebrow - 14 and stimulator was set to parameters below.  Initial train was well tolerated so 55 trains were delivered.  Patient tolerated procedure well with minimal right eyebrow movement.    Motor Threshold Determination  Distance from nasion to inion: 35.5cm  MT 1: 0 - 7 - 14 @ 61% on 03/06/2024    Standard LDLPFC  Frequency: 18  Train Duration: 2  Total pulses delivered: 1980  Inter-train interval: 20  Tx Loc: 0 - eyebrows - 14    Energy: 73% (120%MT)  Trains: 55   *Use spacer*    Date MADRS QIDS PHQ-9   03/06/2024 33 18 21   3/15/2024  13 15   3/22/24  16 18   3/29/24  16 8   4/5/24  18 11   4/12/24  16 16   4/19/24  19 15   4/26/24  14 13   4/29/24 25  11             4/25/2024    10:43 AM 4/26/2024    10:38 AM 4/29/2024    11:22 AM   PHQ-9 SCORE   PHQ-9 Total Score 10 13 11     Plan   - Continue TMS treatments    The service provided today was under the supervising provider of the day, Reva Hutchison MD, who was available if needed.     Vee Ljoa, TMS Technician  [facility]

## 2024-04-30 ENCOUNTER — TELEPHONE (OUTPATIENT)
Dept: GASTROENTEROLOGY | Facility: CLINIC | Age: 69
End: 2024-04-30
Payer: MEDICARE

## 2024-04-30 ENCOUNTER — OFFICE VISIT (OUTPATIENT)
Dept: PSYCHIATRY | Facility: CLINIC | Age: 69
End: 2024-04-30
Payer: MEDICARE

## 2024-04-30 ENCOUNTER — HOSPITAL ENCOUNTER (OUTPATIENT)
Facility: CLINIC | Age: 69
End: 2024-04-30
Attending: INTERNAL MEDICINE | Admitting: INTERNAL MEDICINE
Payer: MEDICARE

## 2024-04-30 DIAGNOSIS — Z12.11 SPECIAL SCREENING FOR MALIGNANT NEOPLASMS, COLON: ICD-10-CM

## 2024-04-30 DIAGNOSIS — Z12.11 COLON CANCER SCREENING: Primary | ICD-10-CM

## 2024-04-30 DIAGNOSIS — F33.1 MODERATE EPISODE OF RECURRENT MAJOR DEPRESSIVE DISORDER (H): Primary | ICD-10-CM

## 2024-04-30 ASSESSMENT — PATIENT HEALTH QUESTIONNAIRE - PHQ9
SUM OF ALL RESPONSES TO PHQ QUESTIONS 1-9: 17
10. IF YOU CHECKED OFF ANY PROBLEMS, HOW DIFFICULT HAVE THESE PROBLEMS MADE IT FOR YOU TO DO YOUR WORK, TAKE CARE OF THINGS AT HOME, OR GET ALONG WITH OTHER PEOPLE: VERY DIFFICULT
SUM OF ALL RESPONSES TO PHQ QUESTIONS 1-9: 17
10. IF YOU CHECKED OFF ANY PROBLEMS, HOW DIFFICULT HAVE THESE PROBLEMS MADE IT FOR YOU TO DO YOUR WORK, TAKE CARE OF THINGS AT HOME, OR GET ALONG WITH OTHER PEOPLE: VERY DIFFICULT

## 2024-04-30 NOTE — TELEPHONE ENCOUNTER
"Endoscopy Scheduling Screen    Have you had a positive Covid test in the last 14 days?  No    What is your communication preference for Instructions and/or Bowel Prep?   Mail/USPS    What insurance is in the chart?  Other:  MEDICARE  BCBS    Ordering/Referring Provider:     GURU MARGARITA NOLAND      (If ordering provider performs procedure, schedule with ordering provider unless otherwise instructed. )    BMI: Estimated body mass index is 33.09 kg/m  as calculated from the following:    Height as of 2/7/24: 1.676 m (5' 6\").    Weight as of 3/20/24: 93 kg (205 lb).     Sedation Ordered  MAC/deep sedation.   BMI<= 45 45 < BMI <= 48 48 < BMI < = 50  BMI > 50   No Restrictions No [facility]  No [facility]  [facility] with exceptions Hospital Only OR Only       Do you have a history of malignant hyperthermia?  No    (Females) Are you currently pregnant?        Have you been diagnosed or told you have pulmonary hypertension?   Yes MAC required in hospital setting only. PAC evaluation required if scheduled at [facility].    Do you have an LVAD?  No    Have you been told you have moderate to severe sleep apnea?  Yes (RN Review required for scheduling unless scheduling in Hospital.)    Have you been told you have COPD, asthma, or any other lung disease?  Yes     What breathing problems do you have?  COPD     Do you use home oxygen?  No    Have your breathing problems required an ED visit or hospitalization in the last year?  No    Do you have any heart conditions?  No     Have you ever had or are you waiting for an organ transplant?  No. Continue scheduling, no site restrictions.    Have you had a stroke or transient ischemic attack (TIA aka \"mini stroke\" in the last 6 months?   No    Have you been diagnosed with or been told you have cirrhosis of the liver?   No    Are you currently on dialysis?   No    Do you need assistance transferring?   No    BMI: Estimated body mass index is 33.09 kg/m  as calculated from the " "following:    Height as of 2/7/24: 1.676 m (5' 6\").    Weight as of 3/20/24: 93 kg (205 lb).     Is patients BMI > 40 and scheduling location [facility]?  No    Do you take an injectable medication for weight loss or diabetes (excluding insulin)?  No    Do you take the medication Naltrexone?  No    Do you take blood thinners?  No       Prep   Are you currently on dialysis or do you have chronic kidney disease?  No    Do you have a diagnosis of diabetes?  Yes (Golytely Prep)    Do you have a diagnosis of cystic fibrosis (CF)?  No    On a regular basis do you go 3 -5 days between bowel movements?  No    BMI > 40?  No    Preferred Pharmacy:    [id number] - [place], [place] - [address]  [address] [facility] [address]  Phone: [phone] Fax: [phone]      Final Scheduling Details     Procedure scheduled  Colonoscopy    Surgeon:    GURU MARGARITA NOLAND        Date of procedure:  7/16     Pre-OP / PAC:   Yes - PAC clinic evaluation scheduled.    Location  [facility] - Per order.    Sedation   MAC/Deep Sedation - Per order.      Patient Reminders:   You will receive a call from a Nurse to review instructions and health history.  This assessment must be completed prior to your procedure.  Failure to complete the Nurse assessment may result in the procedure being cancelled.      On the day of your procedure, please designate an adult(s) who can drive you home stay with you for the next 24 hours. The medicines used in the exam will make you sleepy. You will not be able to drive.      You cannot take public transportation, ride share services, or non-medical taxi service without a responsible caregiver.  Medical transport services are allowed with the requirement that a responsible caregiver will receive you at your destination.  We require that drivers and caregivers are confirmed prior to your procedure.  "

## 2024-05-01 ENCOUNTER — VIRTUAL VISIT (OUTPATIENT)
Dept: PSYCHIATRY | Facility: CLINIC | Age: 69
End: 2024-05-01
Payer: MEDICARE

## 2024-05-01 ENCOUNTER — VIRTUAL VISIT (OUTPATIENT)
Dept: PSYCHOLOGY | Facility: OTHER | Age: 69
End: 2024-05-01
Payer: MEDICARE

## 2024-05-01 DIAGNOSIS — F31.81 BIPOLAR 2 DISORDER (H): Primary | ICD-10-CM

## 2024-05-01 DIAGNOSIS — F33.2 SEVERE EPISODE OF RECURRENT MAJOR DEPRESSIVE DISORDER, WITHOUT PSYCHOTIC FEATURES (H): Primary | ICD-10-CM

## 2024-05-01 DIAGNOSIS — F41.1 GENERALIZED ANXIETY DISORDER: ICD-10-CM

## 2024-05-01 PROCEDURE — 90837 PSYTX W PT 60 MINUTES: CPT | Mod: 95 | Performed by: MARRIAGE & FAMILY THERAPIST

## 2024-05-01 NOTE — TELEPHONE ENCOUNTER
FUTURE VISIT INFORMATION      SURGERY INFORMATION:  Date: 7/16/24  Location:  [facility]  Surgeon:  Guru Elke Tolbert MD   Anesthesia Type:  mac  Procedure: Colonoscopy     RECORDS REQUESTED FROM:       Primary Care Provider: Laith Constantino MD - [facility]    Pertinent Medical History: jose maria, copd, hypertension    Most recent EKG+ Tracing: 10/1/21    Most recent ECHO: 10/5/23    Most recent Cardiac Stress Test: 11/12/21    Most recent PFT's: 2/5/21

## 2024-05-01 NOTE — PROGRESS NOTES
[facility]                                     Progress Note    Patient Name: Randi Cleary  Date: 5/1/2024         Service Type: Individual     Session Start Time: 2:04 p.m. Session End Time: 2:58 p.m.     Session Length: 54 minutes    Session #: 4 with this provider (patient is transferring due to previous provider's leave)    Attendees: Client attended alone    Service Modality:  Video Visit:      Provider verified identity through the following two-step process.  Patient provided:  Patient is known previously to provider and Patient was verified at admission/transfer    Telemedicine Visit: The patient's condition can be safely assessed and treated via synchronous audio and visual telemedicine encounter.      Reason for Telemedicine Visit: Patient convenience (e.g. access to timely appointments / distance to available provider)    Originating Site (Patient Location): Patient's home    Site (Provider Location): [facility]: [place]    Consent:  The patient/guardian has verbally consented to: the potential risks and benefits of telemedicine (video visit) versus in person care; bill my insurance or make self-payment for services provided; and responsibility for payment of non-covered services.     Patient would like the video invitation sent by:  My Chart    Mode of Communication:  Video Conference via Vilynx    Location (Provider):  On-site    As the provider I attest to compliance with applicable laws and regulations related to telemedicine.      DATA  Extended Session (53+ minutes): PROLONGED SERVICE IN THE OUTPATIENT SETTING REQUIRING DIRECT (FACE-TO-FACE) PATIENT CONTACT BEYOND THE USUAL SERVICE:    - Longer session due to limited access to mental health appointments and necessity to address patient's distress / complexity    - Treatment protocol required additional time to complete, due to the nature of diagnosis being treated.  See Interventions section for  details  Interactive Complexity: No  Crisis: No      Progress Since Last Session (Related to Symptoms / Goals / Homework):   Symptoms:  Patient reports slightly decreased depression following the end of TMS treatment (although PHQ-9 numbers do not reflect this); started new TRD group for post-TMS patients and enjoys it but decided to discontinue women's group and reports sending the  a message stating this.  Reports feeling more tired than usual during the past two days.  Discussed desire to get a collie.    Homework:  Some progress - completed TMS; did not get to the pool       Episode of Care Goals: Minimal-moderate progress - ACTION (Actively working towards change); Intervened by reinforcing change plan / affirming steps taken     Current / Ongoing Stressors and Concerns:   Patient is currently socially isolated. She has a conflictual relationship with her .  She is getting minimal physical activity.  She has had several surgeries. Patient is currently completing TMS treatment.     Treatment Objective(s) Addressed in This Session:  Patient will reduce level of depressive and anxious features as evidenced by reduction in score on her CHAVO-7 and PHQ-9 (scores of 15 and 16 at first measurment, respectively).   Patient will increase frequency of engaging in ADLs  Processed trauma and grief  Identify barriers to exercise and healthy eating; create plan to address and overcome barriers  Processed some family system issues and history of grief, loss, and trauma    Past/future:  Patient will track and record at least 5 pleasant exchanges with . Patient will be able to identify at least 5 positive traits about her .     Intervention:  Trauma lens  Family systems dynamics and patterns  Narrative therapy:  DBT/CBT    The following assessments were completed by patient for this visit:    PHQ9:       4/19/2024    10:41 AM 4/22/2024     9:13 AM 4/24/2024     8:51 AM 4/25/2024    10:43 AM  4/26/2024    10:38 AM 4/29/2024    11:22 AM 4/30/2024     9:38 AM   PHQ-9 SCORE   PHQ-9 Total Score MyChart  17 (Moderately severe depression) 13 (Moderate depression)    17 (Moderately severe depression)   PHQ-9 Total Score 15 17    17    17 13    13 10 13 11 17    17     GAD7:       12/1/2023    11:37 AM 12/18/2023     3:09 PM 1/15/2024    12:02 PM 1/29/2024     3:10 AM 3/5/2024     3:08 PM 4/4/2024     8:52 AM 4/18/2024    12:34 PM   CHAVO-7 SCORE   Total Score 14 (moderate anxiety) 16 (severe anxiety) 14 (moderate anxiety) 16 (severe anxiety) 13 (moderate anxiety) 13 (moderate anxiety) 17 (severe anxiety)   Total Score 14 16 14 16 13 13    13    13    13 17    17     PROMIS 10-Global Health (only subscores and total score):       7/28/2023     3:24 PM 8/7/2023     1:07 PM 9/20/2023    10:13 AM 10/31/2023     4:36 AM 12/1/2023    11:52 AM 1/22/2024    12:00 PM 5/1/2024    11:53 AM   PROMIS-10 Scores Only   Global Mental Health Score 5 6    6 5    5    5 6 5 5 7    7    7   Global Physical Health Score 7 8    8 9    9    9 8 8 9 9    9    9   PROMIS TOTAL - SUBSCORES 12 14    14 14    14    14 14 13 14 16    16    16      ASSESSMENT: Current Emotional / Mental Status (status of significant symptoms):   Risk status (Self / Other harm or suicidal ideation)   Patient denies current fears or concerns for personal safety.   Patient denies current or recent suicidal ideation or behaviors. Has hx of SI.   Patient denies current or recent homicidal ideation or behaviors.   Patient denies current or recent self injurious behavior or ideation.   Patient denies other safety concerns.   Patient reports there has been no change in risk factors since their last session.     Patient reports there has been no change in protective factors since their last session.     A safety and risk management plan has been developed including: Patient consented to co-developed safety plan on 11/24/2020, updated 2/20/23.  Safety and risk  management plan was reviewed.   Patient agreed to use safety plan should any safety concerns arise.  A copy was made available to the patient.     Appearance:   Appropriate    Eye Contact:   Good    Psychomotor Behavior: Normal    Attitude:   Cooperative    Orientation:   All   Speech    Rate / Production: Normal/ Responsive    Volume:  Normal    Mood:    Anxious  Expansive   Affect:    Appropriate    Thought Content:  Clear  Rumination    Thought Form:  Coherent  Flight of Ideas    Insight:    Good      Medication Review:   No changes to current psychiatric medication(s)      Medication Compliance:   Yes     Changes in Health Issues:   Cough has returned    Recent: Problems with new specialty shoe inserts/shoes     Chemical Use Review:   Substance Use: Chemical use reviewed, no active concerns identified ; Nothing used since August 2021.     Tobacco Use: No current tobacco use.      Diagnosis:  1. Bipolar 2 disorder (H)    2. Generalized anxiety disorder      Borderline personality disorder vs. Bipolar 2    Note: There has been demonstrated improvement in functioning while patient has been engaged in psychotherapy/psychological service- if withdrawn the patient would deteriorate and/or relapse.     Collateral Reports Completed:   Not Applicable    PLAN: (Patient Tasks / Therapist Tasks / Other)    Patient reports that her goals for the next week include:    Get some meals assembled  Maybe get pool shoes and then get to the pool at least 1x  Continue to try to make healthy choices with nutrition and decreasing pop intake      Reina Hoang                                                           ______________________________________________________________________    Individual Treatment Plan    Patient's Name: Randi CHRISTIANSEN Cathy Izquierdon  YOB: 1955    Date of Creation: 6/30/2020  Date Treatment Plan Last Reviewed/Revised: 3/15/2024    DSM5 Diagnoses: 296.89 Bipolar II Disorder Depressed, 300.02 (F41.1)  "Generalized Anxiety Disorder, or Adjustment Disorders  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  Relationship with  is conflictual. She is recovering from surgeries.    PROMIS (reviewed every 90 days): PROMIS-10 Scores    PROMIS 10-Global Health (only subscores and total score):   PROMIS-10 Scores Only 2021 3/15/2022 3/15/2022 3/15/2022 3/24/2022 2022 2022   Global Mental Health Score 6 6 6 6 8 12 12   Global Physical Health Score 9 9 9 9 8 11 10   PROMIS TOTAL - SUBSCORES 15 15 15 15 16 23 22     Referral / Collaboration:  Referral to another professional/service is not indicated at this time.  Pt is engaging in TMS.    Anticipated number of sessions for this episode of care: 50  Anticipated frequency of sessions: weekly or Biweekly  Anticipated duration of each session: 53 or more minutes due to intensity of trauma symptoms (continued)  Treatment plan will be reviewed in 90 days or when goals have been changed.     Measurable Treatment Goal(s) related to diagnosis / functional impairment(s)  Goal 1: Patient will \"jumpstart, getting going with the things I need to be doing around the house as far as picking up, doing things, trying to do something every day.  Also to lessen the animosity between me and my .\"    I will know I've met my goal when my shoulders are fixed and I can see.      Objective #A (Patient Action)    Patient will  increase frequency of engaging her in ADLs .  Status: Continued - Date(s): 12/10/21, 3/9/22, 22, 22, 22, 3/2/23, 23, 23, 23, 3/15/24    Intervention(s)  Therapist will  engage patient in CBT, specifically behavioral activation .    Objective #B  Patient will   track and record at least 5 pleasant exchanges with . Patient will be able to identify at least 5 positive " "traits about her  and how he relates to her .  Status: Continued - Date(s): 12/10/21, 3/9/22, 6/9/22, 9/2/22, 12/1/22, 3/2/23, 6/6/23, 9/13/23, 12/14/23, 3/15/24    Intervention(s)  Therapist will  teach assertiveness skills and assign homework related to relationship interactions .    Objective #C  Patient will  reduce level of depressive and anxious features as evidenced by reduction in score on her CHAVO-7 and PHQ-9 (scores of 15 and 16 at first measurment, respectively) .  Status: Continued - Date(s): 12/10/21, 3/9/22, 6/9/22, 9/2/22, 12/1/22, 3/2/23, 6/6/23, 9/13/23, 12/14/23, 3/15/24    Intervention(s)  Therapist will  engage patient in person-centered therapy and CBT .    Patient has reviewed and agreed to the above plan.    Treatment plan was developed with previous provider and adopted by new therapist.      Reina Hoang  March 15, 2024                                                   Randi Cleary          SAFETY PLAN:  Step 1: Warning signs / cues (Thoughts, images, mood, situation, behavior) that a crisis may be developing:  Thoughts: \"I don't want to continue\" \"I am unwanted\"  Images: none  Thinking Processes: ruminating  Mood: anger  Behaviors: isolating/withdrawing , can't stop crying, not taking care of myself and not taking care of my responsibilities  Situations: small triggers, such as not being able to find something, or dropping something   Step 2: Coping strategies - Things I can do to take my mind off of my problems without contacting another person (relaxation technique, physical activity):  Distress Tolerance Strategies:  arts and crafts: drawing, play with my pet , listen to positive and upbeat music: any, change body temperature (ice pack/cold water)  and paced breathing/progressive muscle relaxation  Physical Activities: go for a walk, deep breathing and stretching   Focus on helpful thoughts:  \"You've been through this before, you can get through it again.\"  Step 3: People " and social settings that provide distraction:                 Name: Carmen                            Name: Darien                           Name: Aleida       pool, shopping, Carmen's house, [employer]       Step 4: Remind myself of people and things that are important to me and worth living for:  Sidney, Clifford, Aleida, post-COVID world, options of what could be in your future        Step 5: When I am in crisis, I can ask these people to help me use my safety plan:                 Name: Sidney  Step 6: Making the environment safe:   go to sleep/daydream  Step 7: Professionals or agencies I can contact during a crisis:  [facility] Daytime Number: [phone]  Suicide Prevention Lifeline: 3-435-927-TALK (7522)  Crisis Text Line Service (available 24 hours a day, 7 days a week): Text [place] to 020218  Local Crisis Services: [facility] Crisis: [phone]  Adults can always access to the emPATH unit at [facility] (no phone number, utilize it like an urgent care or ER where you just show up)     Call 911 or go to my nearest emergency department.       I helped develop this safety plan and agree to use it when needed.  I have been given a copy of this plan.       Client signature _________________________________________________________________  Today s date:  11/24/2020  Adapted from Safety Plan Template 2008 Randi Poole and Robby Barba is reprinted with the express permission of the authors.  No portion of the Safety Plan Template may be reproduced without the express, written permission.  You can contact the authors at [email or web address] or [email or web address].

## 2024-05-01 NOTE — PROGRESS NOTES
Call placed to patient following completion of course of TMS treatment.    Patient reports she has mixed feelings about how the process went. Overall a positive effect  She describes that she feels a cloud has been lifted enough so that she can go out  to the pool more.  However she notes that one of her therapists left, and other plans changed, so her mood has worsened in some areas.    She reports that she continues to feel sleep deprived. This was present prior to the TMS, but seemed to worsen for a while. She feels that her sleep is improving again. She notes she has restless legs and this also doesn't help.    Winnie reports  she has lost appetite but is still able to eat.   She notes that she is trying to stick to an anti-inflammatory diet.    She has regular (weekly) appointments with Himanshu Jiang, and is part of a group.    We reviewed the scheduled follow up at this location  Future Appointments   Date Time Provider Department Center   5/7/2024  9:30 AM Arsenio Jiang, PhD LP [facility]   5/7/2024  2:30 PM Deborah Barba LICSW [facility]   5/14/2024  9:30 AM Arsenio Jiang, PhD LP [facility]   5/14/2024  2:30 PM Deborah Barba [facility]   5/21/2024  8:45 AM Arnaldo Varela MD [facility]   5/21/2024  9:30 AM Arsenio Jiang, PhD LP [facility]   5/21/2024  2:30 PM Deborah Barba [facility]     No other questions at this time

## 2024-05-01 NOTE — PATIENT INSTRUCTIONS
Patient reports that her goals for the next week include:    Get some meals assembled  Maybe get pool shoes and then get to the pool at least 1x  Continue to try to make healthy choices with nutrition and decreasing pop intake

## 2024-05-03 ENCOUNTER — TELEPHONE (OUTPATIENT)
Dept: PSYCHIATRY | Facility: CLINIC | Age: 69
End: 2024-05-03

## 2024-05-03 ENCOUNTER — TELEPHONE (OUTPATIENT)
Dept: CARDIOLOGY | Facility: CLINIC | Age: 69
End: 2024-05-03
Payer: MEDICARE

## 2024-05-03 NOTE — TELEPHONE ENCOUNTER
Pt stated that she was not wanting to have the right heart cath on 5/23.  I was able to talk her into delaying it a month.  So we are going to reschedule everything and have the follow-up with either Grace or Leena sometime in June.  Candace Jarrett notified and request sent to [facility] for rescheduling.    Miki Richardson RN on 5/3/2024 at 3:54 PM

## 2024-05-06 ENCOUNTER — TELEPHONE (OUTPATIENT)
Dept: CARDIOLOGY | Facility: CLINIC | Age: 69
End: 2024-05-06
Payer: MEDICARE

## 2024-05-06 ENCOUNTER — TELEPHONE (OUTPATIENT)
Dept: PSYCHIATRY | Facility: CLINIC | Age: 69
End: 2024-05-06
Payer: MEDICARE

## 2024-05-06 NOTE — TELEPHONE ENCOUNTER
5/6 Patient confirmed scheduled appointment:  Date: 7/9/2024  Time: 8:30 AM  Visit type: Return Pulmonary Hypertension  Provider: Grace   Location: [facility]  Testing/imaging: n/a  Additional notes: [facility] follow up

## 2024-05-06 NOTE — TELEPHONE ENCOUNTER
Writer returned Winnie's phone call. Winnie is aware the appt on Wednesday, 5/8 is to discuss gabapentin only and that Felicia Hicks, pharm D will need to discuss the medication further with DION Kaplan, CNP before it can be prescribed.     Per Winnie, for the last couple weeks, she has tried her 's gabapentin 300 mg a few nights to see if it helps with her RLS. She has noticed significant improvements and was wondering if Jeannette could give her a prescription for this. Patient is also prescribed Ambien 5 mg and Requip 2 mg, but said her RLS symptoms have continued and often lead to waking throughout the night. Winnie reports that when she takes the gabapentin, she will skip her doses of Ambien and Requip.     As indicated in the chart, Winnie confirms she had an adverse reaction to gabapentin. This time around, she only takes the medication at bedtime and denies any side effects.     Regarding her mood and other mental symptoms, the patient said she's doing pretty well. Reports she's taking a break from TMS because June and July are booked with medical appointments. She's also tired of the drive from [place] to [place]. She will notify this team if she has any worsening of mood.    Kimberly Zavala RN on 5/6/2024 at 9:10 AM

## 2024-05-06 NOTE — TELEPHONE ENCOUNTER
[facility] Call Center    Phone Message    May a detailed message be left on voicemail: yes     Reason for Call: Other: New Medication request     Writer reached out to patient to reschedule today's appointment due to provider being out. Patient stated she really needed this appointment because she would really like to get prescribed Gabapentin. Patient stated she had taken this medication in the past but then went off of it. Patient stated she has taken her 's gabapentin a few times and it has helped with her restless legs, sleep and pain. Patient cannot get into her pain clinic until July and asked about making an appointment with PharmD provider, Felicia Hicks. Writer scheduled patient for Wednesday, 5/8. Patient would like to talk to Felicia about Gabapentin.     Action Taken: Message routed to:  Other: [facility] Nurse regalado / Jeannette Mendoza / Felicia Hicks    Travel Screening: Not Applicable

## 2024-05-06 NOTE — PROGRESS NOTES
Interventional Psychiatry Program   Group  [facility], [place]      Patient: Randi Cleary (1955)     MRN: [id number]  Date:  4/30/24    Number of Participants: 8  Group Time: 90 minutes  Group Format: Hybrid, in-person and Amwell  Facilitators: ASHKAN Novoa      The treatment resistant depression (TRD) group is based on the cognitive behavioral therapy model that uses psychoeducation, cognitive restructuring, problem solving techniques, and social skills.       Diagnostic criteria for group participation: Major depressive disorder, bipolar disorder and current patient in TRD program      Group Topic for Today: Intros, group agreements, feedback, check-in      Description: Active Participant  This is Winnie's first group. She was engaged in discussion with other patients about depression, coping skills, and shared experiences.  Winnie's emotional presentation was open and cooperative. Winnie's mood was depressed, anxious and her affect was appropriate     Progress:  making progress toward goals     Plan: Continue with group goals to minimize impact of depressive symptoms and maintain stability     Date of Initial Service: 4/30/2024    Date of INTIAL Treatment Plan: 4/30/2024    DSM-V DIAGNOSIS:    Major depressive disorder    CURRENT SYMPTOMS and circumstances that substantiate the diagnosis:   Low mood, anhedonia, social isolation, recently completed TMS    How symptoms and/or behaviors are affecting level of functioning:   Limited social connections, difficult activating      TREATMENT  PLAN:          SYMPTOMS; PROBLEMS   MEASURABLE GOALS;    FUNCTIONAL IMPROVEMENT INTERVENTIONS + WHO WILL PROVIDE;   GAINS MADE DISCHARGE CRITERIA   Major depressive disorder Improve and manage symptoms of psychosis. Group CBT with an emphasis on social skills, psychoeducation, and recovery.    Gains Made: N/A Eight week session        Frequency of Sessions:  weekly  Expected duration of treatment:  Eight  weeks

## 2024-05-07 ENCOUNTER — OFFICE VISIT (OUTPATIENT)
Dept: PSYCHIATRY | Facility: CLINIC | Age: 69
End: 2024-05-07
Payer: MEDICARE

## 2024-05-07 DIAGNOSIS — F33.1 MODERATE EPISODE OF RECURRENT MAJOR DEPRESSIVE DISORDER (H): Primary | ICD-10-CM

## 2024-05-07 NOTE — PROGRESS NOTES
"Interventional Psychiatry Program   Group  [facility] [place]      Patient: Randi Cleary (1955)     MRN: [id number]  Date:  5/07/24    Number of Participants: 8  Group Time: 90 minutes  Group Format: Hybrid, in-person and Amwell  Facilitators: ASHKAN Novoa      The treatment resistant depression (TRD) group is based on the cognitive behavioral therapy model that uses psychoeducation, cognitive restructuring, problem solving techniques, and social skills.       Diagnostic criteria for group participation: Major depressive disorder, bipolar disorder and current patient in TRD program      Group Topic for Today: Intros, group agreements, feedback, check-in      Description: Active Participant  This is Winnie's first group. She was engaged in discussion with other patients about depression, coping skills, and shared experiences.  Winnie's emotional presentation was open and cooperative. Winnie's mood was depressed, anxious and her affect was appropriate. Winnie took time, wants to \"make sure\" she continues improvements gained by TMS, and describes limited options for support.    Progress:  making progress toward goals     Plan: Continue with group goals to minimize impact of depressive symptoms and maintain stability     Date of Initial Service: 4/30/2024    Date of INTIAL Treatment Plan: 4/30/2024    DSM-V DIAGNOSIS:    Major depressive disorder    CURRENT SYMPTOMS and circumstances that substantiate the diagnosis:   Low mood, anhedonia, social isolation, recently completed TMS    How symptoms and/or behaviors are affecting level of functioning:   Limited social connections, difficult activating      TREATMENT  PLAN:          SYMPTOMS; PROBLEMS   MEASURABLE GOALS;    FUNCTIONAL IMPROVEMENT INTERVENTIONS + WHO WILL PROVIDE;   GAINS MADE DISCHARGE CRITERIA   Major depressive disorder Improve and manage symptoms of psychosis. Group CBT with an emphasis on social skills, psychoeducation, and " recovery.    Gains Made: N/A Eight week session        Frequency of Sessions:  weekly  Expected duration of treatment:  Eight weeks

## 2024-05-08 ENCOUNTER — VIRTUAL VISIT (OUTPATIENT)
Dept: PHARMACY | Facility: CLINIC | Age: 69
End: 2024-05-08
Payer: COMMERCIAL

## 2024-05-08 DIAGNOSIS — M19.071 OSTEOARTHRITIS OF RIGHT ANKLE AND FOOT: ICD-10-CM

## 2024-05-08 DIAGNOSIS — G25.81 RLS (RESTLESS LEGS SYNDROME): Primary | ICD-10-CM

## 2024-05-08 PROCEDURE — 99207 PR NO CHARGE LOS: CPT | Mod: 95 | Performed by: PHARMACIST

## 2024-05-08 RX ORDER — OXYCODONE HYDROCHLORIDE 5 MG/1
5 TABLET ORAL
Qty: 140 TABLET | Refills: 0 | Status: ON HOLD | OUTPATIENT
Start: 2024-05-08 | End: 2024-06-21

## 2024-05-08 NOTE — TELEPHONE ENCOUNTER
Received call from patient requesting refill(s) of oxyCODONE (ROXICODONE) 5 MG tablet     Last dispensed from pharmacy on 4/9/2024.    Patient's last office/virtual visit by prescribing provider on 3/20/2024.  Next office/virtual appointment scheduled for 8/1/2024.    Last urine drug screen date 10/18/2023.  Current opioid agreement on file (completed within the last year) Yes Date of opioid agreement: 10/18/2023.    E-prescribe to:    [id number] [place], [place] - [address]    Will route to nursing [place] for review and preparation of prescription(s).

## 2024-05-08 NOTE — PROGRESS NOTES
"Medication Therapy Management (MTM) Encounter    ASSESSMENT:                            Medication Adherence/Access: No issues identified    RLS:   Discussed that she might be experiencing some augmentation with ropinirole since she is noticing symptoms earlier in the evening and seem to be getting worse.  Gabapentin has been pretty helpful and could consider retrying a prescription for just a bedtime dose, as it has been well tolerated. Discussed risks of taking it during the day, which she agreed not to do. Will discuss with Dr. Dubois since she is unable to get an appointment with him until 8/1.  Renal function WNL.      PLAN:                            -I will talk with Dr. Dubois about med changes    Follow-up: 1-3 months  Pain management 8/1    SUBJECTIVE/OBJECTIVE:                          Winnie Cleary is a 68 year old female contacted via secure video for a follow-up visit.       Reason for visit: \"can I start gabapentin?\"    Allergies/ADRs: Reviewed in chart  Past Medical History: Reviewed in chart  Tobacco: She reports that she quit smoking about 23 years ago. Her smoking use included cigarettes. She started smoking about 53 years ago. She has a 72.9 pack-year smoking history. She has been exposed to tobacco smoke. She has never used smokeless tobacco.  Alcohol: not currently using    Medication Adherence/Access: no issues reported    RLS/sleep:    -ropinirole 2mg at 3pm, bedtime, and during the night if needed  -zolpidem 5mg at bedtime prn (hasn't taken in the last week)    Patient scheduled this appointment to discuss started gabapentin. States she had taken it in the past and was helpful for restless legs, sleep and pain.   Gabapentin is on her allergy list for \"driving on the wrong side of the highway\" and it appears she was taking 100mg QID +300mg at bedtime at that time.    Today, she reported that she has been having issues with restless legs for a long time. She describes that she developed a " tolerance to pramipexole over a few years in the past and was switched to ropinirole, though seems to be happening again.   She states that she is starting to notice symptoms earlier in the evening and sometimes feels it when she wakes up in the morning, gets better once she gets going during the day.    She had tried her 's gabapentin 300mg, which was very helpful for falling asleep despite restless legs. She is waking up about 4 hours later and does have some restlessness but able to get back to sleep a bit better. She does think it has also helped with neuropathy in her feet as well.   Discussed inability to get ferritin much higher due to issues with elevated hemoglobin and hematocrit.    Per previous notes, she was recommended to use CPAP but has issues with anxiety and infections when using it in the past. She does have an upcoming sleep study to review since she has lost some weight.               ----------------      I spent 20 minutes with this patient today. A copy of the visit note was provided to the patient's provider(s).    A summary of these recommendations was sent via clinic portal.    Felicia Hicks, Rashel  Medication Therapy Management Pharmacist  [facility]      Telemedicine Visit Details  Type of service:  Video Conference via [employer]  Start Time:  9:30am  End Time:  9:50am     Medication Therapy Recommendations  Restless leg syndrome    Rationale: Synergistic therapy - Needs additional medication therapy - Indication   Recommendation: Start Medication - consider gabapentin   Status: Contact Provider - Awaiting Response

## 2024-05-08 NOTE — TELEPHONE ENCOUNTER
Pending Prescriptions:                       Disp   Refills    oxyCODONE (ROXICODONE) 5 MG tablet        140 ta*0            Sig: Take 1 tablet (5 mg) by mouth 5 times daily May           dispense/start 5/8/24

## 2024-05-08 NOTE — PATIENT INSTRUCTIONS
"Recommendations from today's MTM visit:                                                         I will talk with Dr. Dubois about med changes    Follow-up: as needed    It was great speaking with you today.  I value your experience and would be very thankful for your time in providing feedback in our clinic survey. In the next few days, you may receive an email or text message from [employer] with a link to a survey related to your  clinical pharmacist.\"     To schedule another MTM appointment, please call the clinic directly or you may call the MTM scheduling line at [phone] or toll-free at [phone].     My Clinical Pharmacist's contact information:                                                      Please feel free to contact me with any questions or concerns you have.      Felicia Hicks, PharmD  Medication Therapy Management Pharmacist  [facility]    "

## 2024-05-08 NOTE — TELEPHONE ENCOUNTER
[facility] Call Center    Phone Message    May a detailed message be left on voicemail: yes     Reason for Call: Medication Refill Request    Has the patient contacted the pharmacy for the refill? Yes   Name of medication being requested:   oxyCODONE (ROXICODONE) 5 MG tablet       Provider who prescribed the medication:   Dsuty Dubios MD       Pharmacy:   [id number] [place], [place] - [address]     Date medication is needed: 5/10/2024       Action Taken: Message routed to:  Other: [facility]    Travel Screening: Not Applicable

## 2024-05-08 NOTE — Clinical Note
Hello, I have been seeing a lot of your patients lately! I see this pt through psychiatry, but she reached out to me about gabapentin for RLS. Described happening earlier in the evening and more severe, impacting ability to fall asleep. Issues with daytime gabapentin in the past, but she has been taking a couple of her 's koko 300mg lately in desperation for sleep, which has been quite effective. Are you ok with Rx for koko 300mg at bedtime for her? She can't get in to see you until 8/1. Thanks, Felicia

## 2024-05-09 ENCOUNTER — TELEPHONE (OUTPATIENT)
Dept: CARDIOLOGY | Facility: CLINIC | Age: 69
End: 2024-05-09
Payer: MEDICARE

## 2024-05-09 NOTE — TELEPHONE ENCOUNTER
5/9 Left Voicemail (1st Attempt) and MyChart (1st attempt) for the patient to call back and schedule the following:    Appointment type: Return Pulmonary Hypertension  Provider: Grace   Return date: 6/19/2024( ok per Yuri to use [facility] slots)  Specialty phone number: [phone] opt 1  Additional appointment(s) needed: n/a  Additonal Notes: [facility] follow up

## 2024-05-09 NOTE — TELEPHONE ENCOUNTER
5/9 Patient confirmed scheduled appointment:  Date: 6/19/2024  Time: 1:30 pm  Visit type: Return Pulmonary Hypertension  Provider: Grace  Location: [facility]  Testing/imaging: n/a  Additional notes: n/a

## 2024-05-14 ENCOUNTER — TELEPHONE (OUTPATIENT)
Dept: CARDIOLOGY | Facility: CLINIC | Age: 69
End: 2024-05-14
Payer: MEDICARE

## 2024-05-15 ENCOUNTER — VIRTUAL VISIT (OUTPATIENT)
Dept: PSYCHOLOGY | Facility: OTHER | Age: 69
End: 2024-05-15
Payer: MEDICARE

## 2024-05-15 DIAGNOSIS — F31.81 BIPOLAR 2 DISORDER (H): Primary | ICD-10-CM

## 2024-05-15 DIAGNOSIS — F41.1 GENERALIZED ANXIETY DISORDER: ICD-10-CM

## 2024-05-15 PROCEDURE — 90837 PSYTX W PT 60 MINUTES: CPT | Mod: 95 | Performed by: MARRIAGE & FAMILY THERAPIST

## 2024-05-15 ASSESSMENT — ANXIETY QUESTIONNAIRES
2. NOT BEING ABLE TO STOP OR CONTROL WORRYING: SEVERAL DAYS
2. NOT BEING ABLE TO STOP OR CONTROL WORRYING: SEVERAL DAYS
1. FEELING NERVOUS, ANXIOUS, OR ON EDGE: MORE THAN HALF THE DAYS
8. IF YOU CHECKED OFF ANY PROBLEMS, HOW DIFFICULT HAVE THESE MADE IT FOR YOU TO DO YOUR WORK, TAKE CARE OF THINGS AT HOME, OR GET ALONG WITH OTHER PEOPLE?: VERY DIFFICULT
7. FEELING AFRAID AS IF SOMETHING AWFUL MIGHT HAPPEN: NOT AT ALL
IF YOU CHECKED OFF ANY PROBLEMS ON THIS QUESTIONNAIRE, HOW DIFFICULT HAVE THESE PROBLEMS MADE IT FOR YOU TO DO YOUR WORK, TAKE CARE OF THINGS AT HOME, OR GET ALONG WITH OTHER PEOPLE: VERY DIFFICULT
7. FEELING AFRAID AS IF SOMETHING AWFUL MIGHT HAPPEN: NOT AT ALL
6. BECOMING EASILY ANNOYED OR IRRITABLE: MORE THAN HALF THE DAYS
7. FEELING AFRAID AS IF SOMETHING AWFUL MIGHT HAPPEN: NOT AT ALL
GAD7 TOTAL SCORE: 9
3. WORRYING TOO MUCH ABOUT DIFFERENT THINGS: SEVERAL DAYS
1. FEELING NERVOUS, ANXIOUS, OR ON EDGE: MORE THAN HALF THE DAYS
GAD7 TOTAL SCORE: 9
6. BECOMING EASILY ANNOYED OR IRRITABLE: MORE THAN HALF THE DAYS
GAD7 TOTAL SCORE: 9
7. FEELING AFRAID AS IF SOMETHING AWFUL MIGHT HAPPEN: NOT AT ALL
4. TROUBLE RELAXING: MORE THAN HALF THE DAYS
3. WORRYING TOO MUCH ABOUT DIFFERENT THINGS: SEVERAL DAYS
GAD7 TOTAL SCORE: 9
5. BEING SO RESTLESS THAT IT IS HARD TO SIT STILL: SEVERAL DAYS
8. IF YOU CHECKED OFF ANY PROBLEMS, HOW DIFFICULT HAVE THESE MADE IT FOR YOU TO DO YOUR WORK, TAKE CARE OF THINGS AT HOME, OR GET ALONG WITH OTHER PEOPLE?: VERY DIFFICULT
IF YOU CHECKED OFF ANY PROBLEMS ON THIS QUESTIONNAIRE, HOW DIFFICULT HAVE THESE PROBLEMS MADE IT FOR YOU TO DO YOUR WORK, TAKE CARE OF THINGS AT HOME, OR GET ALONG WITH OTHER PEOPLE: VERY DIFFICULT
GAD7 TOTAL SCORE: 9
GAD7 TOTAL SCORE: 9
5. BEING SO RESTLESS THAT IT IS HARD TO SIT STILL: SEVERAL DAYS
4. TROUBLE RELAXING: MORE THAN HALF THE DAYS

## 2024-05-15 NOTE — PATIENT INSTRUCTIONS
Patient reports that her goals for the next week include:    Try to figure out how to get correct shoe inserts  Maybe get pool shoes and then get to the pool at least 1x (continued)  See TMS doctor for final appointment and ask about further bonus treatments and when

## 2024-05-15 NOTE — PROGRESS NOTES
[facility]                                     Progress Note    Patient Name: Randi Cleary  Date: 5/15/2024         Service Type: Individual     Session Start Time: 2:04 p.m. Session End Time: 3:01 p.m.     Session Length: 57 minutes    Session #: 5 with this provider (patient is transferring due to previous provider's leave)    Attendees: Client attended alone    Service Modality:  Video Visit:      Provider verified identity through the following two-step process.  Patient provided:  Patient is known previously to provider and Patient was verified at admission/transfer    Telemedicine Visit: The patient's condition can be safely assessed and treated via synchronous audio and visual telemedicine encounter.      Reason for Telemedicine Visit: Patient convenience (e.g. access to timely appointments / distance to available provider)    Originating Site (Patient Location): Patient's home    Site (Provider Location): [facility]: [place]    Consent:  The patient/guardian has verbally consented to: the potential risks and benefits of telemedicine (video visit) versus in person care; bill my insurance or make self-payment for services provided; and responsibility for payment of non-covered services.     Patient would like the video invitation sent by:  My Chart    Mode of Communication:  Video Conference via [employer]    Location (Provider):  On-site    As the provider I attest to compliance with applicable laws and regulations related to telemedicine.      DATA  Extended Session (53+ minutes): PROLONGED SERVICE IN THE OUTPATIENT SETTING REQUIRING DIRECT (FACE-TO-FACE) PATIENT CONTACT BEYOND THE USUAL SERVICE:    - Longer session due to limited access to mental health appointments and necessity to address patient's distress / complexity    - Treatment protocol required additional time to complete, due to the nature of diagnosis being treated.  See Interventions section for  details  Interactive Complexity: No  Crisis: No      Progress Since Last Session (Related to Symptoms / Goals / Homework):   Symptoms:  Patient reports higher anxiety related to a flood of health-related appointments (that she had deferred while going through TMS treatments.  Discussed recent frustrations with an orthopedic provider after receiving shoes and inserts that did not fit.    Homework:  Some progress - completed TMS; did not get to the pool (continued)       Episode of Care Goals: Minimal-moderate progress - ACTION (Actively working towards change); Intervened by reinforcing change plan / affirming steps taken     Current / Ongoing Stressors and Concerns:   Patient is currently socially isolated. She has a conflictual relationship with her .  She is getting minimal physical activity.  She has had several surgeries. Patient is currently completing TMS treatment.     Treatment Objective(s) Addressed in This Session:  Patient will reduce level of depressive and anxious features as evidenced by reduction in score on her CHAVO-7 and PHQ-9 (scores of 15 and 16 at first measurment, respectively).   Patient will increase frequency of engaging in ADLs  Identified how to advocate for self  Identified reasons for recent health-related anxiety  Identify barriers to exercise and healthy eating; create plan to address and overcome barriers    Past/future:  Processed trauma and grief  Processed some family system issues and history of grief, loss, and trauma  Patient will track and record at least 5 pleasant exchanges with . Patient will be able to identify at least 5 positive traits about her .     Intervention:  Solution-focused  Narrative therapy:  DBT/CBT    Past/future:  Trauma lens  Family systems dynamics and patterns    The following assessments were completed by patient for this visit:    PHQ9:       4/19/2024    10:41 AM 4/22/2024     9:13 AM 4/24/2024     8:51 AM 4/25/2024    10:43 AM  4/26/2024    10:38 AM 4/29/2024    11:22 AM 4/30/2024     9:38 AM   PHQ-9 SCORE   PHQ-9 Total Score MyChart  17 (Moderately severe depression) 13 (Moderate depression)    17 (Moderately severe depression)   PHQ-9 Total Score 15 17    17    17 13    13 10 13 11 17    17     GAD7:       12/18/2023     3:09 PM 1/15/2024    12:02 PM 1/29/2024     3:10 AM 3/5/2024     3:08 PM 4/4/2024     8:52 AM 4/18/2024    12:34 PM 5/15/2024    11:25 AM   CHAVO-7 SCORE   Total Score 16 (severe anxiety) 14 (moderate anxiety) 16 (severe anxiety) 13 (moderate anxiety) 13 (moderate anxiety) 17 (severe anxiety) 9 (mild anxiety)   Total Score 16 14 16 13 13    13    13    13 17    17 9    9    9    9     PROMIS 10-Global Health (only subscores and total score):       8/7/2023     1:07 PM 9/20/2023    10:13 AM 10/31/2023     4:36 AM 12/1/2023    11:52 AM 1/22/2024    12:00 PM 5/1/2024    11:53 AM 5/15/2024    11:27 AM   PROMIS-10 Scores Only   Global Mental Health Score 6    6 5    5    5 6 5 5 7    7    7 7    7   Global Physical Health Score 8    8 9    9    9 8 8 9 9    9    9 8    8   PROMIS TOTAL - SUBSCORES 14    14 14    14    14 14 13 14 16    16    16 15    15      ASSESSMENT: Current Emotional / Mental Status (status of significant symptoms):   Risk status (Self / Other harm or suicidal ideation)   Patient denies current fears or concerns for personal safety.   Patient denies current or recent suicidal ideation or behaviors. Has hx of SI.   Patient denies current or recent homicidal ideation or behaviors.   Patient denies current or recent self injurious behavior or ideation.   Patient denies other safety concerns.   Patient reports there has been no change in risk factors since their last session.     Patient reports there has been no change in protective factors since their last session.     A safety and risk management plan has been developed including: Patient consented to co-developed safety plan on 11/24/2020, updated 2/20/23.   Safety and risk management plan was reviewed.   Patient agreed to use safety plan should any safety concerns arise.  A copy was made available to the patient.     Appearance:   Appropriate    Eye Contact:   Good    Psychomotor Behavior: Normal    Attitude:   Cooperative    Orientation:   All   Speech    Rate / Production: Normal/ Responsive    Volume:  Normal    Mood:    Anxious  Sad  Expansive Frustrated   Affect:    Appropriate  Tearful   Thought Content:  Clear  Rumination    Thought Form:  Coherent  Flight of Ideas    Insight:    Good      Medication Review:   No changes to current psychiatric medication(s)      Medication Compliance:   Yes     Changes in Health Issues:   Difficulties getting correctly-fitting diabetic shoes/inserts    Recent: Problems with new specialty shoe inserts/shoes     Chemical Use Review:   Substance Use: Chemical use reviewed, no active concerns identified ; Nothing used since August 2021.     Tobacco Use: No current tobacco use.      Diagnosis:  1. Bipolar 2 disorder (H)    2. Generalized anxiety disorder      Borderline personality disorder vs. Bipolar 2    Note: There has been demonstrated improvement in functioning while patient has been engaged in psychotherapy/psychological service- if withdrawn the patient would deteriorate and/or relapse.     Collateral Reports Completed:   Not Applicable    PLAN: (Patient Tasks / Therapist Tasks / Other)    Patient reports that her goals for the next week include:    Try to figure out how to get correct shoe inserts  Maybe get pool shoes and then get to the pool at least 1x (continued)  See TMS doctor for final appointment and ask about further bonus treatments and when       Reina Hoang                                                           ______________________________________________________________________    Individual Treatment Plan    Patient's Name: Randi CHRISTIANSEN Cathy Evin  YOB: 1955    Date of Creation:  "2020  Date Treatment Plan Last Reviewed/Revised: 3/15/2024    DSM5 Diagnoses: 296.89 Bipolar II Disorder Depressed, 300.02 (F41.1) Generalized Anxiety Disorder, or Adjustment Disorders  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  Relationship with  is conflictual. She is recovering from surgeries.    PROMIS (reviewed every 90 days): PROMIS-10 Scores    PROMIS 10-Global Health (only subscores and total score):   PROMIS-10 Scores Only 2021 3/15/2022 3/15/2022 3/15/2022 3/24/2022 2022 2022   Global Mental Health Score 6 6 6 6 8 12 12   Global Physical Health Score 9 9 9 9 8 11 10   PROMIS TOTAL - SUBSCORES 15 15 15 15 16 23 22     Referral / Collaboration:  Referral to another professional/service is not indicated at this time.  Pt is engaging in TMS.    Anticipated number of sessions for this episode of care: 50  Anticipated frequency of sessions: weekly or Biweekly  Anticipated duration of each session: 53 or more minutes due to intensity of trauma symptoms (continued)  Treatment plan will be reviewed in 90 days or when goals have been changed.     Measurable Treatment Goal(s) related to diagnosis / functional impairment(s)  Goal 1: Patient will \"jumpstart, getting going with the things I need to be doing around the house as far as picking up, doing things, trying to do something every day.  Also to lessen the animosity between me and my .\"    I will know I've met my goal when my shoulders are fixed and I can see.      Objective #A (Patient Action)    Patient will  increase frequency of engaging her in ADLs .  Status: Continued - Date(s): 12/10/21, 3/9/22, 22, 22, 22, 3/2/23, 23, 23, 23, 3/15/24    Intervention(s)  Therapist will  engage patient in CBT, specifically behavioral activation " ".    Objective #B  Patient will   track and record at least 5 pleasant exchanges with . Patient will be able to identify at least 5 positive traits about her  and how he relates to her .  Status: Continued - Date(s): 12/10/21, 3/9/22, 6/9/22, 9/2/22, 12/1/22, 3/2/23, 6/6/23, 9/13/23, 12/14/23, 3/15/24    Intervention(s)  Therapist will  teach assertiveness skills and assign homework related to relationship interactions .    Objective #C  Patient will  reduce level of depressive and anxious features as evidenced by reduction in score on her CHAVO-7 and PHQ-9 (scores of 15 and 16 at first measurment, respectively) .  Status: Continued - Date(s): 12/10/21, 3/9/22, 6/9/22, 9/2/22, 12/1/22, 3/2/23, 6/6/23, 9/13/23, 12/14/23, 3/15/24    Intervention(s)  Therapist will  engage patient in person-centered therapy and CBT .    Patient has reviewed and agreed to the above plan.    Treatment plan was developed with previous provider and adopted by new therapist.      Reina Hoang  March 15, 2024                                                   Randi Cleary          SAFETY PLAN:  Step 1: Warning signs / cues (Thoughts, images, mood, situation, behavior) that a crisis may be developing:  Thoughts: \"I don't want to continue\" \"I am unwanted\"  Images: none  Thinking Processes: ruminating  Mood: anger  Behaviors: isolating/withdrawing , can't stop crying, not taking care of myself and not taking care of my responsibilities  Situations: small triggers, such as not being able to find something, or dropping something   Step 2: Coping strategies - Things I can do to take my mind off of my problems without contacting another person (relaxation technique, physical activity):  Distress Tolerance Strategies:  arts and crafts: drawing, play with my pet , listen to positive and upbeat music: any, change body temperature (ice pack/cold water)  and paced breathing/progressive muscle relaxation  Physical Activities: go for " "a walk, deep breathing and stretching   Focus on helpful thoughts:  \"You've been through this before, you can get through it again.\"  Step 3: People and social settings that provide distraction:                 Name: Carmen                            Name: [place]                           Name: Aleida       pool, shopping, Carmen's house, [employer]       Step 4: Remind myself of people and things that are important to me and worth living for:  Sidney, [place], Aleida, post-COVID world, options of what could be in your future        Step 5: When I am in crisis, I can ask these people to help me use my safety plan:                 Name: Sidney  Step 6: Making the environment safe:   go to sleep/daydream  Step 7: Professionals or agencies I can contact during a crisis:  [facility] Daytime Number: [phone]  Suicide Prevention Lifeline: 0-562-249-TALK 8255)  Crisis Text Line Service (available 24 hours a day, 7 days a week): Text [place] to 420929  Local Crisis Services: [facility] Crisis: [phone]  Adults can always access to the emPATH unit at [facility] (no phone number, utilize it like an urgent care or ER where you just show up)     Call 911 or go to my nearest emergency department.       I helped develop this safety plan and agree to use it when needed.  I have been given a copy of this plan.       Client signature _________________________________________________________________  Today s date:  11/24/2020  Adapted from Safety Plan Template 2008 Randi Poole and Robby Barba is reprinted with the express permission of the authors.  No portion of the Safety Plan Template may be reproduced without the express, written permission.  You can contact the authors at bhs@[email or web address] or [email or web address].  "

## 2024-05-17 ENCOUNTER — PATIENT OUTREACH (OUTPATIENT)
Dept: CARE COORDINATION | Facility: CLINIC | Age: 69
End: 2024-05-17
Payer: MEDICARE

## 2024-05-17 NOTE — PROGRESS NOTES
Clinic Care Coordination Contact  [facility]/Voicemail    Clinical Data: Care Coordinator Outreach    Outreach Documentation Number of Outreach Attempt   5/17/2024  10:48 AM 1       Left message on patient's voicemail with call back information and requested return call.    Plan: Care Coordinator will try to reach patient again in one week.    David Myhre, RN   CCC RN

## 2024-05-20 ENCOUNTER — TELEPHONE (OUTPATIENT)
Dept: CARDIOLOGY | Facility: CLINIC | Age: 69
End: 2024-05-20
Payer: MEDICARE

## 2024-05-20 RX ORDER — GABAPENTIN 300 MG/1
300 CAPSULE ORAL AT BEDTIME
Qty: 90 CAPSULE | Refills: 1 | Status: ON HOLD | OUTPATIENT
Start: 2024-05-20 | End: 2024-06-20

## 2024-05-20 NOTE — PROGRESS NOTES
Per Dr. Dubois, ok to start gabapentin 300mg at bedtime.  Rx sent and patient updated.    Felicia Hicks, PharmD  Medication Therapy Management Pharmacist  [facility]

## 2024-05-20 NOTE — PROGRESS NOTES
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 the patient on 5/20/2024  If you checked off any problems, how difficult have these problems made it for you to do your work, take care of things at home, or get along with other people?: Very difficult  PHQ9 TOTAL SCORE: 15  CHAVO 7 TOTAL SCORE: 9      On day of service, time spent was    10 min on chart review  30 min with patient  60 additional minutes coordinating inpatient admission  20 min on note writing and follow up messages

## 2024-05-20 NOTE — TELEPHONE ENCOUNTER
Cath Lab Case Request/Order    Location: [address] [address] [facility] Waiting Room    Procedure: Right Heart Cath (RHC)    Procedure Date:  6/12     Patient Arrival Time: 930    Procedure Time: 4th case to follow    Ordering Provider: Dr. Francisco J Edwards    Performing Cardiologist: KATHRINE    Inpatient Bed Needed: No    Post-  Procedure RAKAN appointment scheduled (1 - 2 weeks): YES     Date: 6/19      Provider: Grace     Communicated Patient Instructions:     NPO, nothing to eat 8 hours and drink 2 hours before arrival time: No     , need to arrange a ride home - unable to drive post- procedure: N/A, RHC     Adult at home, need a responsible adult to stay with patient 24 hours post- procedure: N/A, RHC    Appointment was scheduled: Over the phone    Patient expressed understanding of above instructions and denied further questions at this time.    Siobhan Johnson

## 2024-05-21 ENCOUNTER — OFFICE VISIT (OUTPATIENT)
Dept: PSYCHIATRY | Facility: CLINIC | Age: 69
End: 2024-05-21
Payer: MEDICARE

## 2024-05-21 ENCOUNTER — TELEPHONE (OUTPATIENT)
Dept: PSYCHIATRY | Facility: CLINIC | Age: 69
End: 2024-05-21

## 2024-05-21 ENCOUNTER — TELEPHONE (OUTPATIENT)
Dept: BEHAVIORAL HEALTH | Facility: CLINIC | Age: 69
End: 2024-05-21
Payer: MEDICARE

## 2024-05-21 ENCOUNTER — HOSPITAL ENCOUNTER (INPATIENT)
Facility: CLINIC | Age: 69
LOS: 32 days | Discharge: HOME | DRG: 881 | End: 2024-06-22
Attending: FAMILY MEDICINE | Admitting: PSYCHIATRY & NEUROLOGY
Payer: MEDICARE

## 2024-05-21 VITALS — SYSTOLIC BLOOD PRESSURE: 140 MMHG | HEART RATE: 103 BPM | TEMPERATURE: 97.3 F | DIASTOLIC BLOOD PRESSURE: 80 MMHG

## 2024-05-21 DIAGNOSIS — E66.01 MORBID OBESITY (H): ICD-10-CM

## 2024-05-21 DIAGNOSIS — L40.50 PSORIATIC ARTHROPATHY (H): ICD-10-CM

## 2024-05-21 DIAGNOSIS — F33.2 SEVERE RECURRENT MAJOR DEPRESSION WITHOUT PSYCHOTIC FEATURES (H): Primary | ICD-10-CM

## 2024-05-21 DIAGNOSIS — G95.20 CORD COMPRESSION (H): ICD-10-CM

## 2024-05-21 DIAGNOSIS — M48.02 CERVICAL STENOSIS OF SPINAL CANAL: ICD-10-CM

## 2024-05-21 DIAGNOSIS — G47.00 PERSISTENT INSOMNIA: ICD-10-CM

## 2024-05-21 DIAGNOSIS — G95.20 CORD COMPRESSION (H): Chronic | ICD-10-CM

## 2024-05-21 DIAGNOSIS — F11.90 CHRONIC, CONTINUOUS USE OF OPIOIDS: ICD-10-CM

## 2024-05-21 DIAGNOSIS — M19.071 OSTEOARTHRITIS OF RIGHT ANKLE AND FOOT: ICD-10-CM

## 2024-05-21 DIAGNOSIS — I27.20 PULMONARY HYPERTENSION (H): ICD-10-CM

## 2024-05-21 DIAGNOSIS — R20.0 NUMBNESS IN BOTH HANDS: ICD-10-CM

## 2024-05-21 DIAGNOSIS — R45.851 SUICIDAL IDEATION: ICD-10-CM

## 2024-05-21 DIAGNOSIS — F41.8 DEPRESSION WITH ANXIETY: ICD-10-CM

## 2024-05-21 DIAGNOSIS — M15.0 PRIMARY OSTEOARTHRITIS INVOLVING MULTIPLE JOINTS: ICD-10-CM

## 2024-05-21 DIAGNOSIS — E11.9 TYPE 2 DIABETES MELLITUS WITHOUT COMPLICATION, WITHOUT LONG-TERM CURRENT USE OF INSULIN (H): ICD-10-CM

## 2024-05-21 DIAGNOSIS — J44.9 CHRONIC OBSTRUCTIVE PULMONARY DISEASE, UNSPECIFIED COPD TYPE (H): ICD-10-CM

## 2024-05-21 DIAGNOSIS — Z98.890 HISTORY OF CERVICAL SPINAL SURGERY: Chronic | ICD-10-CM

## 2024-05-21 LAB
ALBUMIN SERPL BCG-MCNC: 4.4 G/DL (ref 3.5–5.2)
ALBUMIN UR-MCNC: NEGATIVE MG/DL
ALP SERPL-CCNC: 89 U/L (ref 40–150)
ALT SERPL W P-5'-P-CCNC: 24 U/L (ref 0–50)
AMPHETAMINES UR QL SCN: ABNORMAL
ANION GAP SERPL CALCULATED.3IONS-SCNC: 12 MMOL/L (ref 7–15)
APPEARANCE UR: CLEAR
AST SERPL W P-5'-P-CCNC: 29 U/L (ref 0–45)
BARBITURATES UR QL SCN: ABNORMAL
BASOPHILS # BLD AUTO: 0.1 10E3/UL (ref 0–0.2)
BASOPHILS NFR BLD AUTO: 1 %
BENZODIAZ UR QL SCN: ABNORMAL
BILIRUB SERPL-MCNC: 0.5 MG/DL
BILIRUB UR QL STRIP: NEGATIVE
BUN SERPL-MCNC: 11.2 MG/DL (ref 8–23)
BZE UR QL SCN: ABNORMAL
CALCIUM SERPL-MCNC: 9.7 MG/DL (ref 8.8–10.2)
CANNABINOIDS UR QL SCN: ABNORMAL
CHLORIDE SERPL-SCNC: 104 MMOL/L (ref 98–107)
COLOR UR AUTO: YELLOW
CREAT SERPL-MCNC: 0.57 MG/DL (ref 0.51–0.95)
DEPRECATED HCO3 PLAS-SCNC: 24 MMOL/L (ref 22–29)
EGFRCR SERPLBLD CKD-EPI 2021: >90 ML/MIN/1.73M2
EOSINOPHIL # BLD AUTO: 0.2 10E3/UL (ref 0–0.7)
EOSINOPHIL NFR BLD AUTO: 2 %
ERYTHROCYTE [DISTWIDTH] IN BLOOD BY AUTOMATED COUNT: 12.7 % (ref 10–15)
FENTANYL UR QL: ABNORMAL
GLUCOSE SERPL-MCNC: 86 MG/DL (ref 70–99)
GLUCOSE UR STRIP-MCNC: NEGATIVE MG/DL
HCT VFR BLD AUTO: 49.6 % (ref 35–47)
HGB BLD-MCNC: 17.7 G/DL (ref 11.7–15.7)
HGB UR QL STRIP: NEGATIVE
IMM GRANULOCYTES # BLD: 0 10E3/UL
IMM GRANULOCYTES NFR BLD: 1 %
KETONES UR STRIP-MCNC: NEGATIVE MG/DL
LEUKOCYTE ESTERASE UR QL STRIP: NEGATIVE
LYMPHOCYTES # BLD AUTO: 1.5 10E3/UL (ref 0.8–5.3)
LYMPHOCYTES NFR BLD AUTO: 19 %
MCH RBC QN AUTO: 33 PG (ref 26.5–33)
MCHC RBC AUTO-ENTMCNC: 35.7 G/DL (ref 31.5–36.5)
MCV RBC AUTO: 93 FL (ref 78–100)
MONOCYTES # BLD AUTO: 0.6 10E3/UL (ref 0–1.3)
MONOCYTES NFR BLD AUTO: 7 %
MUCOUS THREADS #/AREA URNS LPF: PRESENT /LPF
NEUTROPHILS # BLD AUTO: 5.6 10E3/UL (ref 1.6–8.3)
NEUTROPHILS NFR BLD AUTO: 70 %
NITRATE UR QL: NEGATIVE
NRBC # BLD AUTO: 0 10E3/UL
NRBC BLD AUTO-RTO: 0 /100
OPIATES UR QL SCN: ABNORMAL
PCP QUAL URINE (ROCHE): ABNORMAL
PH UR STRIP: 5.5 [PH] (ref 5–7)
PLATELET # BLD AUTO: 232 10E3/UL (ref 150–450)
POTASSIUM SERPL-SCNC: 3.8 MMOL/L (ref 3.4–5.3)
PROT SERPL-MCNC: 7.4 G/DL (ref 6.4–8.3)
RBC # BLD AUTO: 5.36 10E6/UL (ref 3.8–5.2)
RBC URINE: 1 /HPF
SODIUM SERPL-SCNC: 140 MMOL/L (ref 135–145)
SP GR UR STRIP: 1.02 (ref 1–1.03)
TSH SERPL DL<=0.005 MIU/L-ACNC: 0.58 UIU/ML (ref 0.3–4.2)
UROBILINOGEN UR STRIP-MCNC: NORMAL MG/DL
WBC # BLD AUTO: 7.9 10E3/UL (ref 4–11)
WBC URINE: 1 /HPF

## 2024-05-21 PROCEDURE — 124N000002 HC R&B MH UMMC

## 2024-05-21 PROCEDURE — 93010 ELECTROCARDIOGRAM REPORT: CPT | Performed by: FAMILY MEDICINE

## 2024-05-21 PROCEDURE — 81001 URINALYSIS AUTO W/SCOPE: CPT | Performed by: FAMILY MEDICINE

## 2024-05-21 PROCEDURE — 85025 COMPLETE CBC W/AUTO DIFF WBC: CPT | Performed by: FAMILY MEDICINE

## 2024-05-21 PROCEDURE — 99285 EMERGENCY DEPT VISIT HI MDM: CPT | Performed by: FAMILY MEDICINE

## 2024-05-21 PROCEDURE — 80053 COMPREHEN METABOLIC PANEL: CPT | Performed by: FAMILY MEDICINE

## 2024-05-21 PROCEDURE — 250N000013 HC RX MED GY IP 250 OP 250 PS 637

## 2024-05-21 PROCEDURE — 36415 COLL VENOUS BLD VENIPUNCTURE: CPT | Performed by: FAMILY MEDICINE

## 2024-05-21 PROCEDURE — 93005 ELECTROCARDIOGRAM TRACING: CPT | Performed by: FAMILY MEDICINE

## 2024-05-21 PROCEDURE — 84443 ASSAY THYROID STIM HORMONE: CPT | Performed by: FAMILY MEDICINE

## 2024-05-21 PROCEDURE — 80307 DRUG TEST PRSMV CHEM ANLYZR: CPT | Performed by: FAMILY MEDICINE

## 2024-05-21 RX ORDER — OXYCODONE HYDROCHLORIDE 5 MG/1
5 TABLET ORAL EVERY 6 HOURS PRN
Status: DISCONTINUED | OUTPATIENT
Start: 2024-05-21 | End: 2024-05-22

## 2024-05-21 RX ORDER — OXYCODONE HYDROCHLORIDE 5 MG/1
5 TABLET ORAL ONCE
Status: COMPLETED | OUTPATIENT
Start: 2024-05-21 | End: 2024-05-21

## 2024-05-21 RX ORDER — ONDANSETRON 4 MG/1
4 TABLET, ORALLY DISINTEGRATING ORAL EVERY 6 HOURS PRN
Status: DISCONTINUED | OUTPATIENT
Start: 2024-05-21 | End: 2024-06-22 | Stop reason: HOSPADM

## 2024-05-21 RX ORDER — ALBUTEROL SULFATE 90 UG/1
2 AEROSOL, METERED RESPIRATORY (INHALATION) EVERY 6 HOURS PRN
Status: DISCONTINUED | OUTPATIENT
Start: 2024-05-21 | End: 2024-06-22 | Stop reason: HOSPADM

## 2024-05-21 RX ORDER — GUAIFENESIN 600 MG/1
1200 TABLET, EXTENDED RELEASE ORAL 2 TIMES DAILY PRN
Status: DISCONTINUED | OUTPATIENT
Start: 2024-05-21 | End: 2024-06-22 | Stop reason: HOSPADM

## 2024-05-21 RX ORDER — MAGNESIUM CARB/ALUMINUM HYDROX 105-160MG
148 TABLET,CHEWABLE ORAL AT BEDTIME
Status: DISCONTINUED | OUTPATIENT
Start: 2024-05-21 | End: 2024-05-22

## 2024-05-21 RX ORDER — NALOXONE HYDROCHLORIDE 0.4 MG/ML
0.4 INJECTION, SOLUTION INTRAMUSCULAR; INTRAVENOUS; SUBCUTANEOUS
Status: DISCONTINUED | OUTPATIENT
Start: 2024-05-21 | End: 2024-06-22 | Stop reason: HOSPADM

## 2024-05-21 RX ORDER — ROPINIROLE 2 MG/1
2 TABLET, FILM COATED ORAL DAILY PRN
Status: DISCONTINUED | OUTPATIENT
Start: 2024-05-22 | End: 2024-05-21

## 2024-05-21 RX ORDER — OXYCODONE HYDROCHLORIDE 5 MG/1
5 TABLET ORAL EVERY 6 HOURS PRN
Status: DISCONTINUED | OUTPATIENT
Start: 2024-05-21 | End: 2024-05-21

## 2024-05-21 RX ORDER — GABAPENTIN 100 MG/1
100 CAPSULE ORAL EVERY 6 HOURS PRN
Status: DISCONTINUED | OUTPATIENT
Start: 2024-05-21 | End: 2024-06-22 | Stop reason: HOSPADM

## 2024-05-21 RX ORDER — FLUTICASONE FUROATE AND VILANTEROL 100; 25 UG/1; UG/1
1 POWDER RESPIRATORY (INHALATION) DAILY
Status: DISCONTINUED | OUTPATIENT
Start: 2024-05-22 | End: 2024-06-22 | Stop reason: HOSPADM

## 2024-05-21 RX ORDER — MAGNESIUM HYDROXIDE/ALUMINUM HYDROXICE/SIMETHICONE 120; 1200; 1200 MG/30ML; MG/30ML; MG/30ML
30 SUSPENSION ORAL EVERY 4 HOURS PRN
Status: DISCONTINUED | OUTPATIENT
Start: 2024-05-21 | End: 2024-06-22 | Stop reason: HOSPADM

## 2024-05-21 RX ORDER — LEVOTHYROXINE SODIUM 75 UG/1
75 TABLET ORAL
Status: DISCONTINUED | OUTPATIENT
Start: 2024-05-26 | End: 2024-06-22 | Stop reason: HOSPADM

## 2024-05-21 RX ORDER — NALOXONE HYDROCHLORIDE 0.4 MG/ML
0.2 INJECTION, SOLUTION INTRAMUSCULAR; INTRAVENOUS; SUBCUTANEOUS
Status: DISCONTINUED | OUTPATIENT
Start: 2024-05-21 | End: 2024-06-22 | Stop reason: HOSPADM

## 2024-05-21 RX ORDER — ROPINIROLE 2 MG/1
2 TABLET, FILM COATED ORAL 2 TIMES DAILY
Status: DISCONTINUED | OUTPATIENT
Start: 2024-05-21 | End: 2024-05-21

## 2024-05-21 RX ORDER — ACETAMINOPHEN 325 MG/1
650 TABLET ORAL EVERY 4 HOURS PRN
Status: DISCONTINUED | OUTPATIENT
Start: 2024-05-21 | End: 2024-06-22 | Stop reason: HOSPADM

## 2024-05-21 RX ORDER — LANOLIN ALCOHOL/MO/W.PET/CERES
3 CREAM (GRAM) TOPICAL
Status: DISCONTINUED | OUTPATIENT
Start: 2024-05-21 | End: 2024-06-22 | Stop reason: HOSPADM

## 2024-05-21 RX ORDER — POTASSIUM CHLORIDE 1500 MG/1
20 TABLET, EXTENDED RELEASE ORAL DAILY
Status: DISCONTINUED | OUTPATIENT
Start: 2024-05-22 | End: 2024-06-22 | Stop reason: HOSPADM

## 2024-05-21 RX ORDER — OLANZAPINE 10 MG/2ML
2.5 INJECTION, POWDER, FOR SOLUTION INTRAMUSCULAR 3 TIMES DAILY PRN
Status: DISCONTINUED | OUTPATIENT
Start: 2024-05-21 | End: 2024-06-22 | Stop reason: HOSPADM

## 2024-05-21 RX ORDER — OXYCODONE HYDROCHLORIDE 5 MG/1
5 TABLET ORAL
Status: DISCONTINUED | OUTPATIENT
Start: 2024-05-21 | End: 2024-05-21

## 2024-05-21 RX ORDER — ALLOPURINOL 300 MG/1
300 TABLET ORAL ONCE
Qty: 1 TABLET | Refills: 0 | Status: COMPLETED | OUTPATIENT
Start: 2024-05-21 | End: 2024-05-21

## 2024-05-21 RX ORDER — ROPINIROLE 2 MG/1
2 TABLET, FILM COATED ORAL 3 TIMES DAILY
Status: DISCONTINUED | OUTPATIENT
Start: 2024-05-22 | End: 2024-06-22 | Stop reason: HOSPADM

## 2024-05-21 RX ORDER — LEVOTHYROXINE SODIUM 75 UG/1
150 TABLET ORAL
Status: DISCONTINUED | OUTPATIENT
Start: 2024-05-22 | End: 2024-06-22 | Stop reason: HOSPADM

## 2024-05-21 RX ORDER — GABAPENTIN 300 MG/1
300 CAPSULE ORAL AT BEDTIME
Status: DISCONTINUED | OUTPATIENT
Start: 2024-05-21 | End: 2024-05-23

## 2024-05-21 RX ORDER — ZOLPIDEM TARTRATE 5 MG/1
5 TABLET ORAL
Status: DISCONTINUED | OUTPATIENT
Start: 2024-05-21 | End: 2024-06-22 | Stop reason: HOSPADM

## 2024-05-21 RX ORDER — ALLOPURINOL 300 MG/1
300 TABLET ORAL DAILY
Status: DISCONTINUED | OUTPATIENT
Start: 2024-05-22 | End: 2024-05-21

## 2024-05-21 RX ORDER — OLANZAPINE 2.5 MG/1
2.5 TABLET, FILM COATED ORAL 3 TIMES DAILY PRN
Status: DISCONTINUED | OUTPATIENT
Start: 2024-05-21 | End: 2024-06-22 | Stop reason: HOSPADM

## 2024-05-21 RX ORDER — PANTOPRAZOLE SODIUM 40 MG/1
40 TABLET, DELAYED RELEASE ORAL DAILY
Status: DISCONTINUED | OUTPATIENT
Start: 2024-05-22 | End: 2024-06-22 | Stop reason: HOSPADM

## 2024-05-21 RX ORDER — OXYCODONE HYDROCHLORIDE 5 MG/1
5 TABLET ORAL DAILY PRN
Status: DISCONTINUED | OUTPATIENT
Start: 2024-05-21 | End: 2024-05-21

## 2024-05-21 RX ORDER — ROPINIROLE 2 MG/1
2 TABLET, FILM COATED ORAL ONCE
Status: COMPLETED | OUTPATIENT
Start: 2024-05-21 | End: 2024-05-21

## 2024-05-21 RX ORDER — ALLOPURINOL 300 MG/1
300 TABLET ORAL EVERY EVENING
Status: DISCONTINUED | OUTPATIENT
Start: 2024-05-22 | End: 2024-06-22 | Stop reason: HOSPADM

## 2024-05-21 RX ORDER — ROPINIROLE 2 MG/1
2 TABLET, FILM COATED ORAL ONCE
Status: DISCONTINUED | OUTPATIENT
Start: 2024-05-22 | End: 2024-05-21

## 2024-05-21 RX ORDER — POLYETHYLENE GLYCOL 3350 17 G/17G
17 POWDER, FOR SOLUTION ORAL DAILY PRN
Status: DISCONTINUED | OUTPATIENT
Start: 2024-05-21 | End: 2024-06-22 | Stop reason: HOSPADM

## 2024-05-21 RX ORDER — BENZONATATE 100 MG/1
200 CAPSULE ORAL 3 TIMES DAILY PRN
Status: DISCONTINUED | OUTPATIENT
Start: 2024-05-21 | End: 2024-06-22 | Stop reason: HOSPADM

## 2024-05-21 RX ADMIN — ALLOPURINOL 300 MG: 300 TABLET ORAL at 23:18

## 2024-05-21 RX ADMIN — OXYCODONE HYDROCHLORIDE 5 MG: 5 TABLET ORAL at 23:24

## 2024-05-21 RX ADMIN — BENZONATATE 200 MG: 100 CAPSULE ORAL at 23:00

## 2024-05-21 RX ADMIN — GABAPENTIN 300 MG: 300 CAPSULE ORAL at 23:19

## 2024-05-21 RX ADMIN — OXYCODONE HYDROCHLORIDE 5 MG: 5 TABLET ORAL at 23:20

## 2024-05-21 RX ADMIN — ROPINIROLE 2 MG: 2 TABLET, FILM COATED ORAL at 23:19

## 2024-05-21 ASSESSMENT — ACTIVITIES OF DAILY LIVING (ADL)
DIFFICULTY_COMMUNICATING: NO
EQUIPMENT_CURRENTLY_USED_AT_HOME: GRAB BAR, TOILET
TOILETING_ISSUES: NO
DIFFICULTY_EATING/SWALLOWING: NO
NUMBER_OF_TIMES_PATIENT_HAS_FALLEN_WITHIN_LAST_SIX_MONTHS: 0
ADLS_ACUITY_SCORE: 36
ADLS_ACUITY_SCORE: 46
CHANGE_IN_FUNCTIONAL_STATUS_SINCE_ONSET_OF_CURRENT_ILLNESS/INJURY: NO
ADLS_ACUITY_SCORE: 38
DRESSING/BATHING_DIFFICULTY: NO
WEAR_GLASSES_OR_BLIND: NO
WALKING_OR_CLIMBING_STAIRS_DIFFICULTY: NO
DOING_ERRANDS_INDEPENDENTLY_DIFFICULTY: NO
ADLS_ACUITY_SCORE: 38
FALL_HISTORY_WITHIN_LAST_SIX_MONTHS: NO
CONCENTRATING,_REMEMBERING_OR_MAKING_DECISIONS_DIFFICULTY: NO
ADLS_ACUITY_SCORE: 63
ADLS_ACUITY_SCORE: 63
ADLS_ACUITY_SCORE: 46
HEARING_DIFFICULTY_OR_DEAF: NO

## 2024-05-21 ASSESSMENT — PAIN SCALES - GENERAL: PAINLEVEL: SEVERE PAIN (7)

## 2024-05-21 NOTE — PATIENT INSTRUCTIONS
Today's Recommendations:    - Today we discussed Electroconvulsive therapy(ECT) treatment for you, and we agreed that we would send you to the hospital to start ECT as soon as possible    - If you have decided you definitely are going to do ECT in the next month, please follow the instructions below:     A. Before Starting ECT treatment series: the pre-authorization     You will see an internal medicine doctor to make sure you don't have any medical problems that prevent us from safely doing ECT.     Then, we will seek insurance approval - this is not usually difficult to obtain     As soon as we have medical clearance and insurance approval, we will schedule your first ECT session.      B. Once ECT is approved: the treatment    Your visit will take about 2 to 3 hours.  Please wear a loose-fitting or short-sleeved shirt.  Bring a list of your current medications.    Driving: Do not drive for 24 hours after your treatment if you are having only 1 treatment a week. A responsible adult must drive you home and stay with you for 6 hours after your treatment. If you will be having more that 1 treatment within a week, please do not drive until 7 days after your last ECT treatment.    Stop all food, milk, and tobacco at least 8 hours before treatment (this is usually midnight).  Also, refrain from chewing gum or sucking on hard candy. If needed and recommended by your PCP, you can drink very minimal amounts of clear liquids until 2 hours before treatment.  Clear liquids include: water, clear juice, gatorade, clear soda, black coffee or clear tea without milk.         Medication changes before and/or during ECT:     i. Make the following changes in your medication before you start ECT: none     ii. Do not take these medications the evening before an ECT is planned: gabapentin         In case you were/will be taking medications, the ECT physician will tell you which medicines to take on the morning of treatment.  These often  include:  Blood pressure medications  Heart medications (for chest pain or irregular heartbeat)  GERD (acid reflux) medications  Inhalers (bronchodilators)  Long-acting (basal) insulin: take 1/2 of your normal dose.        During ECT treatment period, you may call the ECT area [phone] between 7 AM and 2 PM on Monday, Wednesday and Friday about scheduling/cancelling your appointment issues. At other times, you will have to leave a voice message which will be retrieved the next ECT day. For all clinical questions about ECT, you can call our clinic or text us through [employer] for any questions or concerns.  If you need to change your appointment,  please contact your psychiatrist.     DHRUV Prepping for ECT: Important tips to help you get the most of this journey     Start using a calendar and notebook or apps on your smartphone to keep track of appointments, conversations,  and other things you need to remember, as this will be more helpful as the ECT continues.      Start an ECT journal to track your progress. Spend a few minutes writing down how you feel now before ECT and why you are doing ECT. Throughout the course of treatments (probably this will be easier on non-ECT days), write what changes you are noticing in your depression, daily activities or side effects of treatment. This can be useful later on in the ECT if you are having some memory loss day-to-day and can't recall how you used to feel. If you have trouble writing this, try recording a video on non-ECT days.describing the above.     Eat healthily, keep a regular sleep schedule, exercise or other physical activity at least on non-ECT days as able. No alcohol or illegal drugs for 24 hours before or after treatment. To be safe, avoid these altogether during the time you are having ECT.     Keep mentally active by reading, doing crossword puzzles or other word games, play video or other games. You can improve your memory for events happening over the  "previous few days by regularly reviewing things that happened over that period of time which will refresh your memory.      D. Meanwhile, some information about ECT:     - ECT is still one of the most effective treatments for depression on the market.      - There are 2 types of ECT, unilateral (one-sided) and bilateral (two sided). The main differences include:    With bilateral ECT, we can get a faster response (1-2 sessions faster) with increased risk of autobiographical memory loss (where you can forget some moments that might be dear to your heart i.e. it might be spending some time with a loved one)    2.   With Right-sided ECT, we still get a good response in 50 % of cases with less probability of autobiographical memory loss and longer time to response (1-2 sessions slower)     - We would recommend some psychiatric medication treatment within and after ECT treatment period as part of a maintenance plan      -We would also plan some symptomatic treatment medications for any arising side effects including headaches and nausea      -Side effects: besides risk of memory loss, headaches and nausea discussed above, there is a risk of death that is <1 over 50 thousand in probability and that is more attributed to risk of anesthesia rather than ECT per se. In the past 60 years and to our knowledge, there was NOT any specific report of this side effects  from ECT.      - Websites and YouTube videos for more info on ECT:  http://www.[employer].ca/news/shedding-light-on-electroconvulsive-therapy-ect  http://www.[facility].org/tests-procedures/electroconvulsive-therapy/basics/definition/prc-09368987  http://[employer].com/ect/    https://youRivalfox.be/ZYCjz-6iEW4  Https://www.youtube.com/watch?v=IEom9sp5fzq  Https://www.youtube.com/watch?v=OheD70VE6Uh  https://www.isen-ect.org/educational-content    Research  The \"Measurement Based Care\" research study is being conducted throughout the [facility] Department of Psychiatry " "and Behavioral Sciences. This provides some additional testing that might be diagnostically helpful or may help us to know what treatments are better suited to different mental health symptoms. This study will enable us how to better incorporate testing into clinical care.    More information about this study is at: [email or web address]://[email or web address].[facility]/behavioral-measurement-based-care-psychiatry-bmcpposter    If you are interested in the study you can email, [email or web address].    If you do not want to be contacted about research, please let us know. (Being called does not mean you have to be in a  study.)    Our clinic is also conducting clinical trials of new brain stimulation treatments. Other studies are what we would call \"biomarker\" studies, where we ask you to participate in some kind of measurement while you are undergoing regular treatment. You may be called by one of our clinical research coordinators about these studies.      General Information:  Our Treatment-Resistant Disorders/Interventional Psychiatry clinic is what is often called a \"consultation\" or \"tertiary care\" clinic. That means we do not see patients long-term for medication management or talk therapy. We offer thorough evaluations, recommendations about medications/therapies you may have not yet tried, and in some cases, brain stimulation or office-based treatments. If you are likely to benefit from one of those advanced treatments, we will have talked about it today. Once that treatment is complete, we will see you once or twice afterwards to check in, and then you will return to getting ongoing psychiatric care from whomever you were seeing before you came for your evaluation with us. If for some reason you no longer have access to that clinician, we can help with a referral to our main [facility]. The only patients we see long-term are patients with implanted medical devices that require ongoing monitoring and programming.     Our " recommendations almost always include some kind of cognitive-behavioral therapy (CBT) if you are not getting it already. Brain and nerve stimulation treatments work best when combined with certain talk therapies. We make these recommendations because we strongly believe that, without the therapy piece, most people will not get better, or will have limited clinical benefit. It is often difficult or inconvenient to add therapy to an already busy schedule, but it is also necessary.    It is important to remember that, like all mental health treatments, our interventional therapies are not perfect. About one third of people will not feel better after treatment, and even when they work, they do not take away the symptoms entirely.If we have recommended something above, it means we think there is a better than even chance of it working, but things are never guaranteed. This is another reason we usually recommend CBT along with our advanced treatments -- it can address the symptoms that remain after the stimulation/ketamine treatment.       While we are waiting to implement the recommendations you and I have discussed, you should know what to do if your symptoms worsen. A variety of crisis numbers/resources are available. They include:    CRISIS GENERAL NUMBERS   4-048-PKLENJL ([phone])  OR  911     CRISIS INTERVENTION TEAM (CIT) - this is a POLICE UNIT, specially trained.  This website has information for all of [place]'s CITs. Lays out which areas have this team.  Http://cit.[facility]/citmap/[place].php  However, one can just call 911 and ask for this special team.   If police need to be called, DO ask for this team.    CRISIS MOBILE TEAMS  [and see end of this phrase*]  [facility]: 24hrs/7days:    [phone]  (Adults > 17yo)    [phone]  (Child   < 16yo)                                       FRONT DOOR (during the day could call)   [phone]    [facility] -[phone] (Adult)  OR  "[phone] (Child)     [facility] -[phone] (Adult and Child)     [facility] -[phone] ([employer] mobile crisis team; Adult and Child; 24hr)     [facility] -[phone] (Adult and Child)     CRISIS HOUSING  [facility]                           [address]              [phone]  [facility]                                [address]                       [phone]  [facility]                               [address]     [phone]   [facility]  [address]                 [phone]    [place] EMERGENCY NUMBERS    Crisis Connection:                                [phone]  [facility]:     [phone]  Crisis Intervention:                                [phone] or [phone]   COPE: Mobile Team 24hrs/7days:    [phone]  (Adults > 17yo)                                                                            [phone]  (Child   < 16yo)  [facility]        [phone] 24/7 line and Mobile Team   [address] [place] [address]  DROP IN:  M-F: 8:00 am - 7:00 pm  Sat: 11:00 am - 3:00 pm   Sun: Closed     WALK-IN COUNSELING:  [facility]       [phone]    [address]:   M, W, F:  1:00-3:00PM    M-Th:  6:30-8:30PM    TRANS and LGBT  Call [email or web address] at [phone]. This service is staffed by trans people 24/7.   LGBT youth <24 contemplating suicide, call the [employer] [phone].    POISON CONTROL CENTER  [phone]    OR  go to nearest ER    CHILD  \"[facility] has a needs assessment team who will do an intake evaluation. Based on the results of the intake they will recommended inpatient admission, partial hospitalization, intensive outpatient or outpatient care. The " "number is [phone]. \"    CRISIS TEXT LINE  Http://www.crisistextline.org  FREE SUPPORT AT YOUR FINGERTIPS,   Crisis Text Line serves anyone, in any type of crisis, providing access to free,  support and information via the medium people already use and trust: text. Here s how it works:  1)  Text 693344 from anywhere in the [place], anytime, about any type of crisis.  2)  A live, trained Crisis Counselor receives the text and responds quickly.      The volunteer Crisis Counselor will help you move from a 'hot moment to a cool moment'    [facility] EMERGENCY NUMBERS    Crisis Connection:                                [phone]  [facility]:              [phone]  Crisis Intervention:                                [phone] or [phone]   COPE: Mobile Team 24hrs/7days:    [phone]  (Adults > 17yo)                                                                            [phone]  (Child   < 16yo)  [facility]        [phone]  CALL 24 hours a day.  [address] [place] [address]  DROP IN:  M-F: 8:00 am - 7:00 pm  Sat: 11:00 am - 3:00 pm   Sun: Closed    WALK-IN COUNSELIN)  [facility]                                  [phone]        [address]:                  [place], W:      5:00-7:00PM       2)  [facility]                            [phone]        [place], [address] [place] [address]                T, Th:      6:00-8:00PM    TRANS and LGBT  Call [employer] at [phone]. This service is staffed by trans people .   LGBT youth <24 contemplating suicide, call the [employer] [phone].    POISON CONTROL CENTER  [phone]    OR  go to nearest ER    CHILD  \"[facility] has a needs assessment team who will do an intake evaluation. Based on the results of the intake they will recommended inpatient admission, partial hospitalization, intensive outpatient or outpatient care. " "The number is [phone] or [phone]. \"    CRISIS TEXT LINE  Http://www.crisistextline.org  FREE SUPPORT AT YOUR FINGERTIPS, 24/7  Crisis Text Line serves anyone, in any type of crisis, providing access to free, 24/7 support and information via the medium people already use and trust: text. Here s how it works:  1)  Text [phone] from anywhere in the [place], anytime, about any type of crisis.  2)  A live, trained Crisis Counselor receives the text and responds quickly.      The volunteer Crisis Counselor will help you move from a 'hot moment to a cool moment'      * A Community Paramedic (CP) is an advanced paramedic that works to increase access to primary and preventive care and decrease use of emergency departments, which in turn decreases health care costs. Among other things, CPs may play a key role in providing follow-up services after a hospital discharge to prevent hospital readmission. CPs can provide health assessments, chronic disease monitoring and education, medication management, immunizations and vaccinations, laboratory specimen collection, hospital discharge follow-up care and minor medical procedures. CPs work under the direction of an Ambulance Medical Director.       --  We are specifically a university and research clinic, and there are often research studies ongoing. Some of those are clinical trials of new brain stimulation treatments. Others are what we would call \"biomarker\" studies, where we ask you to participate in some kind of measurement while you are undergoing regular treatment.   If you are interested in hearing about research consider signing up for our research registry. This will allow researchers in our clinic to reach out if you become eligible for a study. Sign up today at [email or web address]    In some cases, a clinical trial is the only way to get access to an advanced treatment that is not covered by your insurance. Usually, we will talk about this in your visit if it " "is an option.      Patient Education       General Information:  Our Treatment-Resistant Disorders/Interventional Psychiatry clinic is what is often called a \"consultation\" or \"tertiary care\" clinic. That means we do not see patients long-term for medication management or talk therapy. We offer thorough evaluations, recommendations about medications/therapies you may have not yet tried, and in some cases, brain stimulation or office-based treatments. If you are likely to benefit from one of those advanced treatments, we will have talked about it today. Once that treatment is complete, we will see you once or twice afterwards to check in, and then you will return to getting ongoing psychiatric care from whomever you were seeing before you came for your evaluation with us. If for some reason you no longer have access to that clinician, we can help with a referral to our main [facility]. The only patients we see long-term are patients with implanted medical devices that require ongoing monitoring and programming.     Our recommendations almost always include some kind of cognitive-behavioral therapy (CBT) if you are not getting it already. Brain and nerve stimulation treatments work best when combined with certain talk therapies. We make these recommendations because we strongly believe that, without the therapy piece, most people will not get better, or will have limited clinical benefit. It is often difficult or inconvenient to add therapy to an already busy schedule, but it is also necessary.    It is important to remember that, like all mental health treatments, our interventional therapies are not perfect. About one third of people will not feel better after treatment, and even when they work, they do not take away the symptoms entirely.If we have recommended something above, it means we think there is a better than even chance of it working, but things are never guaranteed. This is another reason " we usually recommend CBT along with our advanced treatments -- it can address the symptoms that remain after the stimulation/ketamine treatment.       While we are waiting to implement the recommendations you and I have discussed, you should know what to do if your symptoms worsen. A variety of crisis numbers/resources are available. They include:    CRISIS GENERAL NUMBERS   6-366-NINNDZM (1-376.322.2000)  OR  911     CRISIS INTERVENTION TEAM (CIT) - this is a POLICE UNIT, specially trained.  This website has information for all of [place]'s CITs. Lays out which areas have this team.  Http://cit.[facility]/citmap/[place].php  However, one can just call 911 and ask for this special team.   If police need to be called, DO ask for this team.    CRISIS MOBILE TEAMS  [and see end of this phrase*]  [facility] -COPE: 24hrs/7days:    [phone]  (Adults > 17yo)    [phone]  (Child   < 18yo)                                       FRONT DOOR (during the day could call)   [phone]    [facility] -[phone] (Adult)  [phone] (Child)     [facility] -[phone] (Adult and Child)     [facility] -[phone] ([employer] mobile crisis team; Adult and Child; 24hr)     [facility] -[phone] (Adult and Child)     CRISIS HOUSING  [facility]                           [address]              [phone]  [facility]                                [address]                       [phone]  [facility]                               [address]     [phone]   [facility]  [address]                [phone]    [place] EMERGENCY NUMBERS    Crisis Connection:                                [phone]  [facility]:     [phone]  Crisis Intervention:                                 "[phone] or [phone]   COPE: Mobile Team 24hrs/7days:    [phone]  (Adults > 19yo)                                                                            [phone]  (Child   < 16yo)  [facility]        [phone] 24/7 line and Mobile Team   [address] [place] [address]  DROP IN:  M-F: 8:00 am - 7:00 pm  Sat: 11:00 am - 3:00 pm   Sun: Closed     WALK-IN COUNSELING:  [facility]       [phone]    [address]:   M, W, F:  1:00-3:00PM    M-Th:  6:30-8:30PM    TRANS and LGBT  Call [employer] at [phone]. This service is staffed by trans people 24/7.   LGBT youth <24 contemplating suicide, call the [employer] [phone].    POISON CONTROL CENTER  [phone]    OR  go to nearest ER    CHILD  \"[facility] has a needs assessment team who will do an intake evaluation. Based on the results of the intake they will recommended inpatient admission, partial hospitalization, intensive outpatient or outpatient care. The number is [phone]. \"    CRISIS TEXT LINE  Http://www.crisistextline.org  FREE SUPPORT AT YOUR FINGERTIPS, 24/7  Crisis Text Line serves anyone, in any type of crisis, providing access to free, 24/7 support and information via the medium people already use and trust: text. Here s how it works:  1)  Text 115093 from anywhere in the [place], anytime, about any type of crisis.  2)  A live, trained Crisis Counselor receives the text and responds quickly.      The volunteer Crisis Counselor will help you move from a 'hot moment to a cool moment'    [facility] EMERGENCY NUMBERS    Crisis Connection:                                [phone]  [facility]:              [phone]  Crisis Intervention:                                [phone] or [phone]   COPE: Mobile Team 24hrs/7days:    [phone]  (Adults > 19yo)                                                           " "                 [phone]  (Child   < 16yo)  [facility]        [phone]  CALL 24 hours a day.  [address] [place] [address]  DROP IN:  M-F: 8:00 am - 7:00 pm  Sat: 11:00 am - 3:00 pm   Sun: Closed    WALK-IN COUNSELIN)  [facility]                                  [phone]        [place], [address]:                  M, W:      5:00-7:00PM       2)  [facility]                            [phone]        [place], [address]                T, Th:      6:00-8:00PM    TRANS and LGBT  Call "[employer]" at [phone]. This service is staffed by trans people .   LGBT youth <24 contemplating suicide, call the [employer] [phone].    POISON CONTROL CENTER  [phone]    OR  go to nearest ER    CHILD  \"[facility] has a needs assessment team who will do an intake evaluation. Based on the results of the intake they will recommended inpatient admission, partial hospitalization, intensive outpatient or outpatient care. The number is [phone] or [phone]. \"    CRISIS TEXT LINE  Http://www.crisistextline.org  FREE SUPPORT AT YOUR FINGERTIPS,   Crisis Text Line serves anyone, in any type of crisis, providing access to free,  support and information via the medium people already use and trust: text. Here s how it works:  1)  Text 583-345 from anywhere in the [place], anytime, about any type of crisis.  2)  A live, trained Crisis Counselor receives the text and responds quickly.      The volunteer Crisis Counselor will help you move from a 'hot moment to a cool moment'      * A Community Paramedic (CP) is an advanced paramedic that works to increase access to primary and preventive care and decrease use of emergency departments, which in turn decreases health care costs. Among other things, CPs may play a key role in providing follow-up services after a hospital discharge to prevent hospital " readmission. CPs can provide health assessments, chronic disease monitoring and education, medication management, immunizations and vaccinations, laboratory specimen collection, hospital discharge follow-up care and minor medical procedures. CPs work under the direction of an Ambulance Medical Director.

## 2024-05-21 NOTE — ED TRIAGE NOTES
Severe depression needs ECT.long history.     Triage Assessment (Adult)       Row Name 05/21/24 7178          Triage Assessment    Airway WDL WDL        Respiratory WDL    Respiratory WDL WDL        Skin Circulation/Temperature WDL    Skin Circulation/Temperature WDL WDL        Cardiac WDL    Cardiac WDL WDL        Peripheral/Neurovascular WDL    Peripheral Neurovascular WDL WDL        Cognitive/Neuro/Behavioral WDL    Cognitive/Neuro/Behavioral WDL WDL

## 2024-05-21 NOTE — ED PROVIDER NOTES
"    [facility] EMERGENCY DEPARTMENT ([facility])    5/21/24      ED PROVIDER NOTE   History     Chief Complaint   Patient presents with    Suicidal     Severe depression needs ECT.long history.     HPI  Randi Cleary is a 68 year old female who presents to the Emergency Department with depression, suicidal ideation and failure of outpatient TMS. She has a long history of depression and recently completed TMS, however, this is has not been successful in treating her depressive symptoms and they have increased. She has been having a difficult time completing ADLs and reports intense suicidal ideation, though denies current plan or intent.  She reports feeling hopeless and as though she has no future.  Dr. Varela felt that \"it is clinically necessary to admit her to an inpatient setting for ECT therapy for crisis stability and would ideally not start ECT as an outpatient for safety and logistical reasons.\" Call was placed by patient's Psychiatry team prior to arrival here, in hopes of arranging DEC bypass.       Past Medical History  Past Medical History:   Diagnosis Date    Bipolar 2 disorder (H)     Breast cancer (H) 1986    lumpectomy, radiation, chemo    Chronic pain syndrome     COPD (chronic obstructive pulmonary disease) (H)     asthma    Cord compression (H) 12/21/2021    Dizzy     Drug tolerance     opioid    Esophageal reflux     Fatigue     Generalized anxiety disorder     Graves disease 1994    Hemochromatosis 02/14/2018    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 Created by Conversion  Replacement Utility updated for latest IMO load Formatting of this note might be different from the original. Created by Conversion  Replacement Utility updated for latest IMO load    History of corticosteroid therapy 11/19/2019    History of partial adrenalectomy (H24) 11/19/2019    History of pheochromocytoma " 11/19/2019    Hx antineoplastic chemotherapy     Hx of radiation therapy     Hyperlipidaemia     Hypertension     Impaired fasting glucose 2017    Injury of neck, whiplash 07/15/2021    Joint pain     KYAW (obstructive sleep apnea) 2016    Osteopenia     Pheochromocytoma, left 08/02/2017    laparoscopically removed    Postablative hypothyroidism 1995    Prediabetes 10/03/2019    by A1c    Psoriasis     Psoriatic arthropathy (H)     Right rotator cuff tear     RLS (restless legs syndrome)     on ropinorole    Sacroiliitis (H24)     Serotonin syndrome 08/28/2020    [facility] - While on desvenlafaxine 100mg    Snoring     Spinal stenosis     Status post coronary angiogram 10/03/2019    Urinary incontinence     Vitamin B 12 deficiency 2009    Vitamin D deficiency 2010     Past Surgical History:   Procedure Laterality Date    ARTHRODESIS ANKLE      ARTHROPLASTY ANKLE Right 6/29/2015    Procedure: ARTHROPLASTY ANKLE;  Surgeon: Jason Coughlin MD;  Location: [facility]    ARTHROPLASTY REVISION ANKLE Right 6/29/2015    Procedure: ARTHROPLASTY REVISION ANKLE;  Surgeon: Jason Coughlin MD;  Location: [facility]    BIOPSY BREAST      BREAST BIOPSY, CORE RT/LT      COLONOSCOPY      COLONOSCOPY N/A 2/25/2021    Procedure: COLONOSCOPY;  Surgeon: Guru Elke Tolbert MD;  Location:  GI    CV CORONARY ANGIOGRAM N/A 10/3/2019    Procedure: CV CORONARY ANGIOGRAM;  Surgeon: Bryce Pierre MD;  Location:  HEART CARDIAC CATH LAB    CV RIGHT HEART CATH MEASUREMENTS RECORDED N/A 10/3/2019    Procedure: CV RIGHT HEART CATH;  Surgeon: Bryce Pierre MD;  Location:  HEART CARDIAC CATH LAB    ESOPHAGOSCOPY, GASTROSCOPY, DUODENOSCOPY (EGD), COMBINED N/A 2/25/2021    Procedure: ESOPHAGOGASTRODUODENOSCOPY, WITH BIOPSY;  Surgeon: Guru Elke Tolbert MD;  Location:  GI    EYE SURGERY  2021    HC REMOVE TONSILS/ADENOIDS,<11 Y/O      Description: Tonsillectomy With Adenoidectomy;   Recorded: 04/07/2010;    IR LUMBAR EPIDURAL STEROID INJECTION  10/26/2004    IR LUMBAR EPIDURAL STEROID INJECTION  11/16/2004    IR LUMBAR EPIDURAL STEROID INJECTION  12/21/2004    IR LUMBAR EPIDURAL STEROID INJECTION  6/8/2006    JOINT REPLACEMENT      LAMINOPLASTY CERVICAL POSTERIOR THREE+ LEVELS Left 12/21/2021    Procedure: CERVICAL 3-CERVICAL 6 LEFT OPEN DOOR LAMINOPLASTY AND LEFT CERVICAL 4-5 AND CERVICAL 6-7 POSTERIOR FORAMINOTOMY;  Surgeon: Angela Gregory MD;  Location: [facility]    LAPAROSCOPIC ADRENALECTOMY Left 08/02/2017    pheochromocytoma    LAPAROSCOPIC ADRENALECTOMY Left 8/2/2017    Procedure: LAPAROSCOPIC LEFT ADRENALECTOMY, ;  Surgeon: Gab Linares MD;  Location: [facility];  Service:     LENGTHEN TENDON ACHILLES Right 6/29/2015    Procedure: LENGTHEN TENDON ACHILLES;  Surgeon: Jason Coughlin MD;  Location: [facility]    LUMPECTOMY BREAST      LUMPECTOMY BREAST Left 1994    MAMMOPLASTY REDUCTION Right 01/13/2015    [place]    MAMMOPLASTY REDUCTION Right     approx late 2015/early2016    MASTECTOMY      left lumpectomy with axillary node dissection    MASTECTOMY MODIFIED RADICAL      OTHER SURGICAL HISTORY Right     reconstructive breast surgery    OTHER SURGICAL HISTORY      Adrenalectomy for pheochromocytoma    [place] MASTECTOMY, MODIFIED RADICAL      Description: Modified Radical Mastectomy Left Breast;  Recorded: 04/07/2010;    REPAIR HAMMER TOE Right 6/29/2015    Procedure: REPAIR HAMMER TOE;  Surgeon: Jason Coughlin MD;  Location: [facility]    TONSILLECTOMY      TONSILLECTOMY & ADENOIDECTOMY      ZZC ARTHRODESIS,ANKLE,OPEN Right     Description: Ankle Arthrodesis;  Recorded: 04/07/2010;     albuterol (VENTOLIN HFA) 108 (90 Base) MCG/ACT inhaler  allopurinol (ZYLOPRIM) 300 MG tablet  benzonatate (TESSALON) 200 MG capsule  Cyanocobalamin (VITAMIN B-12) 5000 MCG SUBL  ethacrynic acid (EDECRIN) 25 MG tablet  Fluticasone-Umeclidin-Vilanterol (TRELEGY ELLIPTA) 200-62.5-25  "MCG/ACT oral inhaler  gabapentin (NEURONTIN) 300 MG capsule  guaiFENesin (MUCINEX) 600 MG 12 hr tablet  KLOR-CON 20 MEQ CR tablet  MAGNESIUM CITRATE PO  medical cannabis (Patient's own supply)  naloxone (NARCAN) 4 MG/0.1ML nasal spray  omeprazole (PRILOSEC) 20 MG DR capsule  ondansetron (ZOFRAN ODT) 4 MG ODT tab  oxyCODONE (ROXICODONE) 5 MG tablet  rOPINIRole (REQUIP) 2 MG tablet  STATIN NOT PRESCRIBED (INTENTIONAL)  SYNTHROID 150 MCG tablet  vitamin C (ASCORBIC ACID) 1000 MG TABS  vitamin D3 (CHOLECALCIFEROL) 250 mcg (76441 units) capsule  zolpidem (AMBIEN) 5 MG tablet  zolpidem (AMBIEN) 5 MG tablet      Allergies   Allergen Reactions    Serotonin Reuptake Inhibitors (Ssris) Anxiety, Difficulty breathing, Headache, Palpitations and Shortness Of Breath    Buspirone      The patient states she had serotonin syndrome    Cephalexin      Other reaction(s): unknown rxn.    Desvenlafaxine      Serotonin syndrome    Diclofenac Sodium [Diclofenac]      Serotonin syndrome and restless legs syndrome    Gabapentin      Drove on the wrong side of the highway    Levofloxacin      \"CAN'T REMEMBER\"    Penicillins      \"SORES IN MOUTH\"    Riluzole Difficulty breathing and Swelling    Sulfa Antibiotics      \"PT DOES NOT KNOW WHAT THE REACTION WAS\"    Topiramate Other (See Comments)     Frequent urination     Family History  Family History   Problem Relation Age of Onset    Lupus Sister     Hypertension Sister     Obesity Sister     Scleroderma Sister     Heart Failure Sister     Hemochromatosis Sister     Hemochromatosis Brother     Hypertension Brother     Alcoholism Brother     Substance Abuse Brother     Hemochromatosis Father     Myocardial Infarction Father     Snoring Father     Heart Disease Father     Obesity Father     Coronary Artery Disease Father          at age 53 of heart attack    Hypertension Father     Genetic Disorder Father         Hemachromatosis    Obesity Mother     Thyroid Disease Mother     Arthritis " "Mother     Hypertension Mother     Osteoporosis Mother     Mental Illness Maternal Uncle     Alcoholism Maternal Grandfather     Obesity Sister     Cardiovascular Sister     Hypertension Sister     Arthritis Sister     Hemochromatosis Sister     Lupus Sister     Scleroderma Sister     Coronary Artery Disease Sister     Genetic Disorder Sister         Lupus, Hemachromatosis    Cardiovascular Brother     Hypertension Brother     Arthritis Brother     Hemochromatosis Brother     Prostate Cancer Brother     Genetic Disorder Brother         Hemachromatosis    Obesity Brother     Cardiovascular Maternal Grandmother     Coronary Artery Disease Maternal Grandmother     Osteoporosis Maternal Grandmother     Cerebrovascular Disease Paternal Grandmother     Adrenal Disorder No family hx of     Breast Cancer No family hx of      Social History   Social History     Tobacco Use    Smoking status: Former     Current packs/day: 0.00     Average packs/day: 2.5 packs/day for 29.2 years (72.9 ttl pk-yrs)     Types: Cigarettes     Start date: 1971     Quit date: 2000     Years since quittin.9     Passive exposure: Past    Smokeless tobacco: Never   Vaping Use    Vaping status: Never Used   Substance Use Topics    Alcohol use: Not Currently     Comment: relapse 2021 sober     Drug use: Yes     Types: Marijuana         A medically appropriate review of systems was performed with pertinent positives and negatives noted in the HPI, and all other systems negative.    Physical Exam   BP: 118/72  Pulse: 87  Temp: (!) 93  F (33.9  C)  Resp: 19  Height: 165.1 cm (5' 5\")  Weight: 88 kg (194 lb)  SpO2: 93 %  Physical Exam  Constitutional:       General: She is not in acute distress.     Appearance: Normal appearance. She is not diaphoretic.   HENT:      Head: Atraumatic.      Mouth/Throat:      Mouth: Mucous membranes are moist.   Eyes:      General: No scleral icterus.     Conjunctiva/sclera: Conjunctivae normal. "   Cardiovascular:      Rate and Rhythm: Normal rate.      Heart sounds: Normal heart sounds.   Pulmonary:      Effort: No respiratory distress.      Breath sounds: Normal breath sounds.   Abdominal:      General: Abdomen is flat.   Musculoskeletal:      Cervical back: Neck supple.   Skin:     General: Skin is warm.      Findings: No rash.   Neurological:      General: No focal deficit present.      Mental Status: She is alert and oriented to person, place, and time.      Sensory: No sensory deficit.      Motor: No weakness.      Coordination: Coordination normal.           ED Course, Procedures, & Data      Procedures            EKG Interpretation:      Interpreted by Bravo Lomeli MD  Time reviewed: 1715  Symptoms at time of EKG: none   Rhythm: normal sinus   Rate: normal  Axis: RBBB  Ectopy: none  Conduction: normal  ST Segments/ T Waves: No ST-T wave changes  Q Waves: none  Comparison to prior: Right bundle branch block unchanged from previous    Clinical Impression: Right bundle branch block          Results for orders placed or performed during the hospital encounter of 05/21/24   EKG 12-lead, tracing only     Status: None (Preliminary result)   Result Value Ref Range    Systolic Blood Pressure  mmHg    Diastolic Blood Pressure  mmHg    Ventricular Rate 78 BPM    Atrial Rate 78 BPM    WV Interval 182 ms    QRS Duration 110 ms     ms    QTc 458 ms    P Axis 80 degrees    R AXIS 76 degrees    T Axis 31 degrees    Interpretation ECG       Sinus rhythm  Right bundle branch block  Abnormal ECG     CBC with platelets differential     Status: None (In process)    Narrative    The following orders were created for panel order CBC with platelets differential.  Procedure                               Abnormality         Status                     ---------                               -----------         ------                     CBC with platelets and d...[[id number]]                      In process                    Please view results for these tests on the individual orders.     Medications - No data to display  Labs Ordered and Resulted from Time of ED Arrival to Time of ED Departure - No data to display  No orders to display          Critical care was not performed.     Medical Decision Making  The patient's presentation was of high complexity (a chronic illness severe exacerbation, progression, or side effect of treatment).    The patient's evaluation involved:  ordering and/or review of 3+ test(s) in this encounter (see separate area of note for details)    The patient's management necessitated high risk (a decision regarding hospitalization).    Assessment & Plan        I have reviewed the nursing notes. I have reviewed the findings, diagnosis, plan and need for follow up with the patient.    Patient with chronic depression increased suicidal ideation at this time is at increased risk and is planning on starting ECT she will be directly admitted to station 20 with medical clearance here in the emergency room.  I am awaiting lab results and will sign the patient out to the evening staff Dr. Flores EKG did not reveal any acute abnormality.    Final diagnoses:   Depression with anxiety   Suicidal ideation         [facility] EMERGENCY DEPARTMENT  5/21/2024     Bravo Lomeli MD  05/21/24 3650

## 2024-05-21 NOTE — TELEPHONE ENCOUNTER
"Outpatient Psychiatry Brief Note    We referred this patient from clinic directly to the [facility] ED this morning for expedited admission.  Around 2pm, attending physician Arnaldo Varela MD spoke with patient and her  over the phone to discuss the plan, and they confirmed they would head to the ED and would be there \"in about an hour.\"  I was notified at 3:30pm that the patient had not arrived yet - I just called the patient now, at which time she said she was outside the ED and going inside, that she had not been able to make it until 4pm.    4:25 PM Notified ED charge RN    Boo Riley MD  Neuromodulation Medicine Fellow, PGY-6  Department of Psychiatry  [facility]  Preferred Contact: Epic Chat   "

## 2024-05-21 NOTE — ED NOTES
"Reviewed medical record for DEC Bypass, per Dr. Arnaldo Varela.     Patient presents with a long history of depression and was referred by her outpatient psychiatrist.  Pt recently completed TMS, however, this is has not been successful in treating her depressive symptoms and they have increased.  Pt is having a difficulty time completing ADLs and reports intense suicidal ideation, though denies current plan or intent.  She reports feeling hopeless and as though she has no future.  Dr. Varela states, \"it is clinically necessary to admit her to an inpatient setting for ECT therapy for crisis stability and would ideally not start ECT as an outpatient for safety and logistical reasons.      Approved bypass.    Critical Safety Issues: Pt reports suicidal ideation, denies plan or intent.    Updated care team and BEC coordinator will update patient placement once patient is medically cleared for admission.       EWELINA Chairez, DEC Clinical Supervisor  [phone]      "

## 2024-05-21 NOTE — H&P
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 25 mg)    Medical course: Patient was physically examined by the ED prior to being transferred to the unit and was found to be medically stable and appropriate for admission.     Consults:  none     Checklist     Legal Status: VOLUNTARY      Safety Assessment:   Behavioral Orders   Procedures    Discontinue 1:1 attendant for suicide risk     Order Specific Question:   I have performed an in person assessment of the patient     Answer:   Based on this assessment the patient no longer requires a one on one attendant at this point in time.     Order Specific Question:   Rationale     Answer:   Patient States able to remain safe in hospital     Order Specific Question:   Rationale     Answer:   Modifications to care environment made to mitigate safety risk     Order Specific Question:   Rationale     Answer:   Routine observations are sufficient to monitor safety.       Risk Assessment:  Risk for harm is elevated.  Risk factors: SI, substance use, trauma, family dynamics, and past behaviors  Protective factors: family and engaged in treatment     SIO:  TBD    Dispo: TBD. Disposition pending clinical stabilization, medication optimization and development of an appropriate discharge plan.     Attestations:     Patient to be staffed with the attending physician in the morning.  Wilner Parker MD  Psychiatry Resident Physician  PGY1    Attestation:  For attending attestation statement, see progress note dated May 22, 2024. Jairo Choudhary MD

## 2024-05-21 NOTE — TELEPHONE ENCOUNTER
Pt still has some concerns following today's visit and having to be in patient. Please follow up.

## 2024-05-21 NOTE — TELEPHONE ENCOUNTER
Pt  called to get an update on why Winnie has to go to the ER today. I did not see a consent to communicate.  Can someone please follow up?

## 2024-05-21 NOTE — LETTER
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  Psychotropic drug interaction check was done, including changes made today.      I, DION Quesada CNP, participated in this visit while shadowing Dr. Varela as part of my on-boarding process.        ATTENDING PHYSICIAN:  Arnaldo Varela MD    Answers for HPI/ROS submitted by the patient on 5/20/2024  If you checked off any problems, how difficult have these problems made it for you to do your work, take care of things at home, or get along with other people?: Very difficult  PHQ9 TOTAL SCORE: 15  CHAVO 7 TOTAL SCORE: 9      On day of service, time spent was    10 min on chart review  30 min with patient  60 additional minutes coordinating inpatient admission  20 min on note writing and follow up messages                 Again, thank you for allowing me to participate in the care of your patient.      Sincerely,    Arnaldo Varela MD

## 2024-05-21 NOTE — TELEPHONE ENCOUNTER
R:  20/Kenrick    4:49 PM Called [facility] ED and per Francisca RN, Pt is now in ED and while on the phone she made sure Pt station had Pt as admitted to [facility] ED so Intake put Pt in unit 20 que.  Let Tory know Pt needs medically cleared note.    4:55 PM Paged unit 20 Provider Kenrick update on Pt finally in 20 que.    4:58 PM Called unit 20 and updated on Pt's being in [facility] ED and ready to come up     Updated worklist and added to the admit board and did Indicia    6:24 PM Called unit 20 and Charge RN said they are just waiting for medical clearance to admit Pt    6:25 PM Called [facility] ED and Pt is medically cleared.  Intake gave ED unit 20 phone number to call for report.
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
## 2024-05-21 NOTE — ED NOTES
"                     Home medications sent to Pharmacy.  1: ethacrynic   2: Albuterol inhaler.  3: Bottle of mixed medications with different colors. Patient stated \" I  take some of some of them daily, and I know what they are by color\". \"  "

## 2024-05-21 NOTE — ED NOTES
Per intake-patient needs to be medically cleared, then has a bed on station 20. Also stated patient has been cleared from DEC, does not need DEC assessment

## 2024-05-22 ENCOUNTER — APPOINTMENT (OUTPATIENT)
Dept: CT IMAGING | Facility: CLINIC | Age: 69
End: 2024-05-22
Attending: PHYSICIAN ASSISTANT
Payer: MEDICARE

## 2024-05-22 PROBLEM — F33.2 SEVERE RECURRENT MAJOR DEPRESSION WITHOUT PSYCHOTIC FEATURES (H): Status: ACTIVE | Noted: 2024-05-22

## 2024-05-22 LAB
ALBUMIN SERPL BCG-MCNC: 4.1 G/DL (ref 3.5–5.2)
ALP SERPL-CCNC: 86 U/L (ref 40–150)
ALT SERPL W P-5'-P-CCNC: 23 U/L (ref 0–50)
ANION GAP SERPL CALCULATED.3IONS-SCNC: 14 MMOL/L (ref 7–15)
AST SERPL W P-5'-P-CCNC: 27 U/L (ref 0–45)
ATRIAL RATE - MUSE: 78 BPM
BASOPHILS # BLD AUTO: 0 10E3/UL (ref 0–0.2)
BASOPHILS NFR BLD AUTO: 1 %
BILIRUB SERPL-MCNC: 0.6 MG/DL
BUN SERPL-MCNC: 9.3 MG/DL (ref 8–23)
CALCIUM SERPL-MCNC: 9.6 MG/DL (ref 8.8–10.2)
CHLORIDE SERPL-SCNC: 104 MMOL/L (ref 98–107)
CHOLEST SERPL-MCNC: 183 MG/DL
CREAT SERPL-MCNC: 0.57 MG/DL (ref 0.51–0.95)
DEPRECATED HCO3 PLAS-SCNC: 24 MMOL/L (ref 22–29)
DIASTOLIC BLOOD PRESSURE - MUSE: NORMAL MMHG
EGFRCR SERPLBLD CKD-EPI 2021: >90 ML/MIN/1.73M2
EOSINOPHIL # BLD AUTO: 0.3 10E3/UL (ref 0–0.7)
EOSINOPHIL NFR BLD AUTO: 4 %
ERYTHROCYTE [DISTWIDTH] IN BLOOD BY AUTOMATED COUNT: 12.7 % (ref 10–15)
FOLATE SERPL-MCNC: 19.2 NG/ML (ref 4.6–34.8)
GLUCOSE SERPL-MCNC: 115 MG/DL (ref 70–99)
HBA1C MFR BLD: 5.6 %
HCT VFR BLD AUTO: 49.9 % (ref 35–47)
HDLC SERPL-MCNC: 61 MG/DL
HGB BLD-MCNC: 17.7 G/DL (ref 11.7–15.7)
IMM GRANULOCYTES # BLD: 0 10E3/UL
IMM GRANULOCYTES NFR BLD: 0 %
INTERPRETATION ECG - MUSE: NORMAL
LDLC SERPL CALC-MCNC: 103 MG/DL
LYMPHOCYTES # BLD AUTO: 1.4 10E3/UL (ref 0.8–5.3)
LYMPHOCYTES NFR BLD AUTO: 20 %
MAGNESIUM SERPL-MCNC: 1.9 MG/DL (ref 1.7–2.3)
MCH RBC QN AUTO: 33.1 PG (ref 26.5–33)
MCHC RBC AUTO-ENTMCNC: 35.5 G/DL (ref 31.5–36.5)
MCV RBC AUTO: 93 FL (ref 78–100)
MONOCYTES # BLD AUTO: 0.5 10E3/UL (ref 0–1.3)
MONOCYTES NFR BLD AUTO: 8 %
NEUTROPHILS # BLD AUTO: 4.8 10E3/UL (ref 1.6–8.3)
NEUTROPHILS NFR BLD AUTO: 67 %
NONHDLC SERPL-MCNC: 122 MG/DL
NRBC # BLD AUTO: 0 10E3/UL
NRBC BLD AUTO-RTO: 0 /100
P AXIS - MUSE: 80 DEGREES
PLATELET # BLD AUTO: 234 10E3/UL (ref 150–450)
POTASSIUM SERPL-SCNC: 3.8 MMOL/L (ref 3.4–5.3)
PR INTERVAL - MUSE: 182 MS
PROT SERPL-MCNC: 7.2 G/DL (ref 6.4–8.3)
QRS DURATION - MUSE: 110 MS
QT - MUSE: 402 MS
QTC - MUSE: 458 MS
R AXIS - MUSE: 76 DEGREES
RBC # BLD AUTO: 5.35 10E6/UL (ref 3.8–5.2)
SODIUM SERPL-SCNC: 142 MMOL/L (ref 135–145)
SYSTOLIC BLOOD PRESSURE - MUSE: NORMAL MMHG
T AXIS - MUSE: 31 DEGREES
TRIGL SERPL-MCNC: 93 MG/DL
VENTRICULAR RATE- MUSE: 78 BPM
VIT B12 SERPL-MCNC: 746 PG/ML (ref 232–1245)
VIT D+METAB SERPL-MCNC: 42 NG/ML (ref 20–50)
WBC # BLD AUTO: 7.1 10E3/UL (ref 4–11)

## 2024-05-22 PROCEDURE — 250N000013 HC RX MED GY IP 250 OP 250 PS 637: Performed by: PHYSICIAN ASSISTANT

## 2024-05-22 PROCEDURE — 82306 VITAMIN D 25 HYDROXY: CPT

## 2024-05-22 PROCEDURE — H2032 ACTIVITY THERAPY, PER 15 MIN: HCPCS

## 2024-05-22 PROCEDURE — G1010 CDSM STANSON: HCPCS | Performed by: RADIOLOGY

## 2024-05-22 PROCEDURE — 83036 HEMOGLOBIN GLYCOSYLATED A1C: CPT

## 2024-05-22 PROCEDURE — 250N000013 HC RX MED GY IP 250 OP 250 PS 637

## 2024-05-22 PROCEDURE — 70450 CT HEAD/BRAIN W/O DYE: CPT | Mod: 26 | Performed by: RADIOLOGY

## 2024-05-22 PROCEDURE — 83735 ASSAY OF MAGNESIUM: CPT | Performed by: PHYSICIAN ASSISTANT

## 2024-05-22 PROCEDURE — 82607 VITAMIN B-12: CPT

## 2024-05-22 PROCEDURE — 124N000002 HC R&B MH UMMC

## 2024-05-22 PROCEDURE — 80061 LIPID PANEL: CPT

## 2024-05-22 PROCEDURE — 36415 COLL VENOUS BLD VENIPUNCTURE: CPT

## 2024-05-22 PROCEDURE — 82746 ASSAY OF FOLIC ACID SERUM: CPT

## 2024-05-22 PROCEDURE — 85004 AUTOMATED DIFF WBC COUNT: CPT

## 2024-05-22 PROCEDURE — 93005 ELECTROCARDIOGRAM TRACING: CPT

## 2024-05-22 PROCEDURE — 99223 1ST HOSP IP/OBS HIGH 75: CPT | Mod: AI | Performed by: PSYCHIATRY & NEUROLOGY

## 2024-05-22 PROCEDURE — 80053 COMPREHEN METABOLIC PANEL: CPT

## 2024-05-22 PROCEDURE — 70450 CT HEAD/BRAIN W/O DYE: CPT | Mod: MF

## 2024-05-22 PROCEDURE — 93010 ELECTROCARDIOGRAM REPORT: CPT | Performed by: INTERNAL MEDICINE

## 2024-05-22 PROCEDURE — 99222 1ST HOSP IP/OBS MODERATE 55: CPT | Performed by: PHYSICIAN ASSISTANT

## 2024-05-22 RX ORDER — LANOLIN ALCOHOL/MO/W.PET/CERES
3 CREAM (GRAM) TOPICAL
COMMUNITY
End: 2024-07-25

## 2024-05-22 RX ORDER — MAGNESIUM OXIDE 400 MG/1
400 TABLET ORAL 2 TIMES DAILY
Status: DISCONTINUED | OUTPATIENT
Start: 2024-05-22 | End: 2024-06-22 | Stop reason: HOSPADM

## 2024-05-22 RX ORDER — OXYCODONE HYDROCHLORIDE 5 MG/1
5 TABLET ORAL
Status: DISCONTINUED | OUTPATIENT
Start: 2024-05-22 | End: 2024-05-23

## 2024-05-22 RX ORDER — OXYCODONE HYDROCHLORIDE 5 MG/1
5 TABLET ORAL EVERY 6 HOURS
Status: DISCONTINUED | OUTPATIENT
Start: 2024-05-22 | End: 2024-05-23

## 2024-05-22 RX ADMIN — Medication: at 20:19

## 2024-05-22 RX ADMIN — Medication 3 MG: at 22:58

## 2024-05-22 RX ADMIN — Medication 500 MCG: at 11:03

## 2024-05-22 RX ADMIN — MAGNESIUM OXIDE TAB 400 MG (241.3 MG ELEMENTAL MG) 400 MG: 400 (241.3 MG) TAB at 12:38

## 2024-05-22 RX ADMIN — Medication 1000 MG: at 08:49

## 2024-05-22 RX ADMIN — LEVOTHYROXINE SODIUM 150 MCG: 75 TABLET ORAL at 07:10

## 2024-05-22 RX ADMIN — ALLOPURINOL 300 MG: 300 TABLET ORAL at 20:18

## 2024-05-22 RX ADMIN — PANTOPRAZOLE SODIUM 40 MG: 40 TABLET, DELAYED RELEASE ORAL at 08:49

## 2024-05-22 RX ADMIN — Medication 3 MG: at 03:14

## 2024-05-22 RX ADMIN — Medication 250 MCG: at 08:49

## 2024-05-22 RX ADMIN — OXYCODONE HYDROCHLORIDE 5 MG: 5 TABLET ORAL at 22:58

## 2024-05-22 RX ADMIN — OXYCODONE HYDROCHLORIDE 5 MG: 5 TABLET ORAL at 07:09

## 2024-05-22 RX ADMIN — GABAPENTIN 300 MG: 300 CAPSULE ORAL at 20:18

## 2024-05-22 RX ADMIN — FLUTICASONE FUROATE AND VILANTEROL TRIFENATATE 1 PUFF: 100; 25 POWDER RESPIRATORY (INHALATION) at 08:49

## 2024-05-22 RX ADMIN — ROPINIROLE HYDROCHLORIDE 2 MG: 2 TABLET, FILM COATED ORAL at 15:21

## 2024-05-22 RX ADMIN — ACETAMINOPHEN 650 MG: 325 TABLET, FILM COATED ORAL at 11:50

## 2024-05-22 RX ADMIN — ACETAMINOPHEN 650 MG: 325 TABLET, FILM COATED ORAL at 17:12

## 2024-05-22 RX ADMIN — OXYCODONE HYDROCHLORIDE 5 MG: 5 TABLET ORAL at 13:06

## 2024-05-22 RX ADMIN — POTASSIUM CHLORIDE 20 MEQ: 1500 TABLET, EXTENDED RELEASE ORAL at 13:05

## 2024-05-22 RX ADMIN — UMECLIDINIUM 1 PUFF: 62.5 AEROSOL, POWDER ORAL at 11:03

## 2024-05-22 RX ADMIN — Medication 12.5 MG: at 15:20

## 2024-05-22 RX ADMIN — OXYCODONE HYDROCHLORIDE 5 MG: 5 TABLET ORAL at 20:19

## 2024-05-22 RX ADMIN — ROPINIROLE HYDROCHLORIDE 2 MG: 2 TABLET, FILM COATED ORAL at 20:19

## 2024-05-22 RX ADMIN — MAGNESIUM OXIDE TAB 400 MG (241.3 MG ELEMENTAL MG) 400 MG: 400 (241.3 MG) TAB at 20:19

## 2024-05-22 ASSESSMENT — ACTIVITIES OF DAILY LIVING (ADL)
ADLS_ACUITY_SCORE: 46
ADLS_ACUITY_SCORE: 49
HYGIENE/GROOMING: HANDWASHING;SHOWER;INDEPENDENT
LAUNDRY: UNABLE TO COMPLETE
ORAL_HYGIENE: INDEPENDENT
DRESS: INDEPENDENT
ADLS_ACUITY_SCORE: 46
ADLS_ACUITY_SCORE: 46
ADLS_ACUITY_SCORE: 43
ORAL_HYGIENE: INDEPENDENT;PROMPTS
ADLS_ACUITY_SCORE: 46
ADLS_ACUITY_SCORE: 49
ADLS_ACUITY_SCORE: 46
ADLS_ACUITY_SCORE: 49
DRESS: SCRUBS (BEHAVIORAL HEALTH);INDEPENDENT
ADLS_ACUITY_SCORE: 46
ADLS_ACUITY_SCORE: 46
ADLS_ACUITY_SCORE: 43
ADLS_ACUITY_SCORE: 49
ADLS_ACUITY_SCORE: 46
LAUNDRY: WITH SUPERVISION
ADLS_ACUITY_SCORE: 46
HYGIENE/GROOMING: INDEPENDENT
ADLS_ACUITY_SCORE: 46
ADLS_ACUITY_SCORE: 43
ADLS_ACUITY_SCORE: 46
ADLS_ACUITY_SCORE: 46
ADLS_ACUITY_SCORE: 43
ADLS_ACUITY_SCORE: 43

## 2024-05-22 NOTE — PLAN OF CARE
Pt did request and given Melatonin for sleep aide. Denied any MH sx at this time. Refused to take scheduled Magnesium Citrate solution stating that she would like to talk to the MD first. Pt remained in her room the entire shift.     Towards the end of the shift,  pt did c/o pain 10/10 to lower back region. Prn Oxycodone  5 mg was given. Safety checks completed every 15 minutes; no safety concerns noted.     Pt is on 1:1 for Medical equipment / ligature risk and Falls.  Pt appears to have slept 4.25  hours. Will continue to monitor and offer support.    Problem: Sleep Disturbance  Goal: Adequate Sleep/Rest  Outcome: Progressing   Goal Outcome Evaluation:

## 2024-05-22 NOTE — CONSULTS
ECT Psychiatry Brief Note    We saw this patient yesterday in Dr. Arnaldo Varela's outpatient clinic, at which time she was struggling with worsening depression even after her TMS course - initial response that did not last.  She was sent directly to the ED for admission for plan for ECT course.  ECT is indicated for severe and treatment resistant depression impairing ADLs and causing safety concerns.    We will plan for RUL ECT pending GIM clearance and anesthesiology review.    Case discussed with ECT attending Giuliana Beavers MD.    Boo Riley MD  Neuromodulation Medicine Fellow, PGY-6  Department of Psychiatry  [facility] / [facility]  Preferred Contact: Crowdfynd
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
## 2024-05-22 NOTE — PLAN OF CARE
"Goal Outcome Evaluation:    Plan of Care Reviewed With: patient Plan of Care Reviewed With: patient    Overall Patient Progress: improvingOverall Patient Progress: improving       Problem: Adult Inpatient Plan of Care  Goal: Plan of Care Review  Description: The Plan of Care Review/Shift note should be completed every shift.  The Outcome Evaluation is a brief statement about your assessment that the patient is improving, declining, or no change.  This information will be displayed automatically on your shift  note.  Outcome: Progressing  Flowsheets (Taken 5/22/2024 5382)  Plan of Care Reviewed With: patient  Overall Patient Progress: improving  Pt was presented with depression and anxiety. Pt was cleared for ECT, watched a Video. Pt is starting ECT on 5/24/24. No gabapentin a night or the day of ECT.  Pt had CT head w/o contrast. Pt on fall and suicide precaution. Remains on 1:1 due for safety and safety was maintained throughout the shift. Pt has dietitian consult.  Pt is right arm only for blood draw and VS. Pt can wear her own clothing, hairbrush and TEDS. Pt appears with full range affect, pleasant and social with staff. Hygiene is adequate. Nutrition is adequate. Endorses generalized chronic pain. Pt had PRN pain medications. Blood pressure 110/76, pulse 74, temperature 97.4  F (36.3  C), temperature source Oral, resp. rate 18, height 1.676 m (5' 6\"), weight 88.6 kg (195 lb 4.8 oz), SpO2 94%, not currently breastfeeding.    "
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
## 2024-05-22 NOTE — PLAN OF CARE
05/21/24 3005   Patient Belongings   Did you bring any home meds/supplements to the hospital?  Yes   Disposition of meds  Sent to security/pharmacy per site process   Patient Belongings locker   Patient Belongings Put in Hospital Secure Location (Security or Locker, etc.) cane;clothing;keys;laptop computer;medical device;money (see comment);purse/wallet;shoes   Belongings Search Yes   Clothing Search Yes   Second Staff Queen BERNIE and Lilia SAUCEDA     List of patient's belonging in the locker:   Zynex bag(1), shoes(2), lighter(3), (1), car key(1 set), honey skin lotion(1), sonia rosario MD(1), coins bag(1), maker bag(1), hand bag(1), underwear(9), bra(1), socks(6 pairs), copper(1 pair), black bag(1), laptop computer(1), laptop bag(1), and laptop (1)      List of valuables sent to security: Envelope# [id number]    Care credit rewards card(# 3807), Prime Visa card(#7337), [employer] card(#1727), Discover card(#0473, 4395), Platinum debit card(#3433), [employer] Visa card(#4034) and $1.00.      1 blue pants, alexi top, 2 pair black socks, 3 pair white socks, 4 white tops, 8 pads, 3 black tops, blue top, 2 black pants 5/28/24      ..A               Admission:  I am responsible for any personal items that are not sent to the safe or pharmacy.  [facility] is not responsible for loss, theft or damage of any property in my possession.    Signature:  _________________________________ Date: _______  Time: _____                                              Staff Signature:  ____________________________ Date: ________  Time: _____      2nd Staff person, if patient is unable/unwilling to sign:    Signature: ________________________________ Date: ________  Time: _____     Discharge:  [facility] has returned all of my personal belongings:    Signature: _________________________________ Date: ________  Time: _____                                          Staff Signature:  ____________________________ Date: ________   Time: _____            Goal Outcome Evaluation:

## 2024-05-22 NOTE — PROGRESS NOTES
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   Patient States able to remain safe in hospital     Order Specific Question:   Rationale     Answer:   Modifications to care environment made to mitigate safety risk     Order Specific Question:   Rationale     Answer:   Routine observations are sufficient to monitor safety.    Electroconvulsive therapy     Series of up to 12 treatments. Begin Date: 5/24/24     Treating Psychiatrist providing ECT:  Ruthann     Notified on:  5/21/24    Electroconvulsive therapy     Series of up to 12 treatments. Begin Date: 5/24/24     Treating Psychiatrist providing ECT:  Ruthann     Notified on:  5/21/24    Fall precautions    Routine Programming     As clinically indicated    Status 15     Every 15 minutes.    Suicide precautions: Suicide Risk: HIGH     Patients on Suicide Precautions should have a Combination Diet ordered that includes a Diet selection(s) AND a Behavioral Tray selection for Safe Tray - with utensils, or Safe Tray - NO utensils       Order Specific  Question:   Suicide Risk     Answer:   HIGH       Risk Assessment:  Risk for harm is moderate-high.  Risk factors: SI, maladaptive coping, trauma, and past behaviors  Protective factors: engaged in treatment     SIO: Discontinued    Disposition: Pending stabilization, medication optimization, & development of a safe discharge plan.     Attestations     This patient was seen and discussed with my attending physician.    Tejinder Correa DO  Psychiatry Resident Physician     Attestation:  I, Jairo Choudhary MD, have personally performed an examination of this patient and I have reviewed the resident's documentation.  I have edited the note to reflect all relevant changes.  I have discussed this patient with the house staff on 5/22/2024.  I agree with resident findings and plan in today's note and yesterdays resident H&P.  I have reviewed all vitals and laboratory findings.      I certifiy that the inpatient services were ordered in accordance with the Medicare regulations governing the order. This includes certification that hospital inpatient services are reasonable and necessary and in the case of services not specified as inpatient-only under 42 .22(n), that they are appropriately provided as inpatient services in accordance with the 2-midnight benchmark under 42 .3(e).     The reason for inpatient status is Major Depression.    Jairo Choudhary M.D.,Ph.D.

## 2024-05-22 NOTE — PROGRESS NOTES
Rehab Group     Start time: 1315  End time: 1430  Patient time total: 15 minutes     attended full group     #5 attended   Group Type: music   Group Topic Covered: self-care, balanced lifestyle, coping skills, relaxation skills , and emotional regulation   Group Session Detail: Cooperatively engaged in Evening Music Relaxation group to decrease anxiety and promote   Mindfulness.          Patient Response/Contribution:  Positive Affect and Listened actively   Patient Detail:  This was Winnie's initial MT group.  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  She also cited this song as having brought her out of panic attacks.  She expressed being sensory seeking (preferring a loud volume).  She was called out of session to meet with psychiatry and did not return.     Activity Therapy Per 15 minutes () Other Rehab therapies    Patient Active Problem List   Diagnosis    DJD (degenerative joint disease), ankle and foot    Hereditary hemochromatosis (H24)    Pulmonary hypertension (H)    Postablative hypothyroidism    Hx of corticosteroid therapy    Class 2 obesity due to excess calories without serious comorbidity in adult    Prediabetes    KYAW (obstructive sleep apnea)    Type 2 diabetes mellitus without complication, without long-term current use of insulin (H)    Hypokalemia    COPD (chronic obstructive pulmonary disease) (H)    Generalized anxiety disorder    Restless leg syndrome    Vitamin B12 deficiency    Vitamin D deficiency    Morbid obesity (H)    Cord compression (H)    History of cervical spinal surgery    Cervical stenosis of spinal canal    Alcohol use disorder, moderate, in sustained remission (H)    Essential hypertension    Psoriatic arthropathy (H)    Primary osteoarthritis involving multiple joints    Gastroesophageal reflux disease  with esophagitis without hemorrhage    Numbness in both hands    Chronic, continuous use of opioids    Trauma and stressor-related disorder    Post-menopausal    Suicidal ideation    Depression with anxiety    Severe recurrent major depression without psychotic features (H)

## 2024-05-22 NOTE — PROGRESS NOTES
Pt is 68 year old female admitted to station 20. Per chart review pt has hx diagnoses of MDD verses bipolar II, CHAVO, unspecified trauma or stressor-related disorder, and borderline traits.  Pt arrived in the unit via w/c. Pt was compliant with search and admission interview. Pt reported that her depression has gotten worse overtime. Pt is hopeless and was teary during the interview. Pt denies for hallucinations and suicidal ideations at this time. Pt uses a cane for ambulation. Pt states she uses it only when outside her home. Pt offered walker but declined stating it hard to use the walker due to her shoulders. Pt c/o of back, neck and shoulder pain and oxycodone was administered as ordered.

## 2024-05-22 NOTE — PROVIDER NOTIFICATION
05/22/24 0927   Individualization/Patient Specific Goals   Patient Personal Strengths appropriate judgment/decision-making;expressive of emotions;expressive of needs;independent living skills;intellectual cognitive skills;interests/hobbies;motivated for recovery;medication/treatment adherence;positive vocational history;resilient;resourceful;socioeconomic stability;stable living environment   Patient Vulnerabilities family/relationship conflict;history of unsuccessful treatment;poor impulse control;traumatic event   Anxieties, Fears or Concerns Ongoing anxiety   Individualized Care Needs None   Interprofessional Rounds   Summary 1. Stabilization of mood disorder symptoms  2. Safe with self  3. Medication mgmt per MD's 4. Medically cleared for ECT 5.Coordination of care with outpatient provders 6. Psych f/u care in place   Participants CTC;patient;psychiatrist;nursing   Behavioral Team Discussion   Participants Dr. Choudhary, Dr. Correa, Winsome Solano RN, Lorena Victoria MA,LP, Med students   Progress Initial assessment   Anticipated length of stay 10-14 days   Continued Stay Criteria/Rationale Severity of depression - inability to care for self   Medical/Physical S/P Breast cancer   Precautions Per unit protocol   Plan Patient will be cleared for ECT.  She will be seen by Psychiatry daily.  Meds will be reviewed/adjusted per MD's.  Care will be coordinated with outpatient providers. Patient will return home when stable/safe.  CTC will continue to assess needs- ensure appropriate f/u care is in place   Rationale for change in precautions or plan No change in plan/precautions   Safety Plan Patient to complete   Anticipated Discharge Disposition home with family     Goal Outcome Evaluation:

## 2024-05-22 NOTE — PHARMACY-ADMISSION MEDICATION HISTORY
Pharmacist Admission Medication History    Admission medication history is complete. The information provided in this note is only as accurate as the sources available at the time of the update.    Information Source(s): Patient and CareEverywhere/SureScripts via phone    Pertinent Information: Randi confirmed her home medications.    Changes made to PTA medication list:  Added: melatonin  Deleted: ondansetron  Changed: None    Medication History Completed By: Cari Valdes [facility] 5/22/2024 11:52 AM    PTA Med List   Medication Sig Note Last Dose    albuterol (VENTOLIN HFA) 108 (90 Base) MCG/ACT inhaler Inhale 2 puffs into the lungs every 6 hours as needed for shortness of breath, wheezing or cough      allopurinol (ZYLOPRIM) 300 MG tablet Take 1 tablet (300 mg) by mouth daily  Past Week    benzonatate (TESSALON) 200 MG capsule Take 1 capsule (200 mg) by mouth 3 times daily as needed for cough      Cyanocobalamin (VITAMIN B-12) 5000 MCG SUBL Place 2-3 sprays under the tongue daily Unknown dose. 2 or 3 sprays/day      ethacrynic acid (EDECRIN) 25 MG tablet Take 12.5 mg by mouth every day  Past Week    Fluticasone-Umeclidin-Vilanterol (TRELEGY ELLIPTA) 200-62.5-25 MCG/ACT oral inhaler Inhale 1 puff into the lungs daily  Past Week    gabapentin (NEURONTIN) 300 MG capsule Take 1 capsule (300 mg) by mouth at bedtime  Past Week    guaiFENesin (MUCINEX) 600 MG 12 hr tablet Take 1,200 mg by mouth 2 times daily as needed for congestion 5/22/2024: Takes every morning     KLOR-CON 20 MEQ CR tablet Take 1 tablet (20 mEq) by mouth daily  Past Week    MAGNESIUM PO Take 1 capsule by mouth 2 times daily Strength unknown  Past Week    medical cannabis (Patient's own supply) See Admin Instructions (The purpose of this order is to document that the patient reports taking medical cannabis.  This is not a prescription, and is not used to certify that the patient has a qualifying medical condition.)  Flower      melatonin 3 MG  tablet Take 3 mg by mouth nightly as needed for sleep      naloxone (NARCAN) 4 MG/0.1ML nasal spray Spray 1 spray (4 mg) into one nostril alternating nostrils as needed for opioid reversal every 2-3 minutes until assistance arrives      omeprazole (PRILOSEC) 20 MG DR capsule Take 1 capsule (20 mg) by mouth daily  Past Week    oxyCODONE (ROXICODONE) 5 MG tablet Take 1 tablet (5 mg) by mouth 5 times daily May dispense/start 5/8/24  Past Week    rOPINIRole (REQUIP) 2 MG tablet One tab 3 pm, one bedtime, and one during night on waking  Past Week    SYNTHROID 150 MCG tablet MON to SAT 1 tablet/day; SUN 0.5 tablet  Past Week    vitamin C (ASCORBIC ACID) 1000 MG TABS Take 1,000 mg by mouth daily  Past Week    vitamin D3 (CHOLECALCIFEROL) 250 mcg (28089 units) capsule Take 1 capsule by mouth once a week  Past Week    zolpidem (AMBIEN) 5 MG tablet Take 1 tablet (5 mg) by mouth nightly as needed for sleep

## 2024-05-22 NOTE — PLAN OF CARE
Initial meeting note:    Therapist introduced self to patient and discussed psychotherapy service available to patient.     Pt response: Pt expressed interest in meeting 1:1 another time.    Plan: Made plan to meet with Pt tomorrow.      ROSY Alvarez  Psychotherapist

## 2024-05-23 PROCEDURE — 124N000002 HC R&B MH UMMC

## 2024-05-23 PROCEDURE — 90837 PSYTX W PT 60 MINUTES: CPT

## 2024-05-23 PROCEDURE — 250N000013 HC RX MED GY IP 250 OP 250 PS 637: Performed by: PHYSICIAN ASSISTANT

## 2024-05-23 PROCEDURE — 250N000013 HC RX MED GY IP 250 OP 250 PS 637

## 2024-05-23 PROCEDURE — 99232 SBSQ HOSP IP/OBS MODERATE 35: CPT | Mod: GC | Performed by: PSYCHIATRY & NEUROLOGY

## 2024-05-23 RX ORDER — OXYCODONE HYDROCHLORIDE 5 MG/1
5 TABLET ORAL EVERY 6 HOURS
Status: DISCONTINUED | OUTPATIENT
Start: 2024-05-23 | End: 2024-05-24

## 2024-05-23 RX ORDER — OXYCODONE HYDROCHLORIDE 5 MG/1
5 TABLET ORAL DAILY PRN
Status: DISCONTINUED | OUTPATIENT
Start: 2024-05-23 | End: 2024-05-24

## 2024-05-23 RX ORDER — GABAPENTIN 400 MG/1
400 CAPSULE ORAL AT BEDTIME
Status: DISCONTINUED | OUTPATIENT
Start: 2024-05-23 | End: 2024-06-22 | Stop reason: HOSPADM

## 2024-05-23 RX ADMIN — Medication: at 13:29

## 2024-05-23 RX ADMIN — OXYCODONE HYDROCHLORIDE 5 MG: 5 TABLET ORAL at 06:03

## 2024-05-23 RX ADMIN — MAGNESIUM OXIDE TAB 400 MG (241.3 MG ELEMENTAL MG) 400 MG: 400 (241.3 MG) TAB at 20:41

## 2024-05-23 RX ADMIN — OXYCODONE HYDROCHLORIDE 5 MG: 5 TABLET ORAL at 00:47

## 2024-05-23 RX ADMIN — OXYCODONE HYDROCHLORIDE 5 MG: 5 TABLET ORAL at 18:16

## 2024-05-23 RX ADMIN — Medication 12.5 MG: at 10:44

## 2024-05-23 RX ADMIN — FLUTICASONE FUROATE AND VILANTEROL TRIFENATATE 1 PUFF: 100; 25 POWDER RESPIRATORY (INHALATION) at 09:14

## 2024-05-23 RX ADMIN — OXYCODONE HYDROCHLORIDE 5 MG: 5 TABLET ORAL at 13:25

## 2024-05-23 RX ADMIN — POTASSIUM CHLORIDE 20 MEQ: 1500 TABLET, EXTENDED RELEASE ORAL at 09:15

## 2024-05-23 RX ADMIN — Medication 1000 MG: at 09:15

## 2024-05-23 RX ADMIN — MAGNESIUM OXIDE TAB 400 MG (241.3 MG ELEMENTAL MG) 400 MG: 400 (241.3 MG) TAB at 09:15

## 2024-05-23 RX ADMIN — ROPINIROLE HYDROCHLORIDE 2 MG: 2 TABLET, FILM COATED ORAL at 20:41

## 2024-05-23 RX ADMIN — ROPINIROLE HYDROCHLORIDE 2 MG: 2 TABLET, FILM COATED ORAL at 00:48

## 2024-05-23 RX ADMIN — OXYCODONE HYDROCHLORIDE 5 MG: 5 TABLET ORAL at 21:53

## 2024-05-23 RX ADMIN — UMECLIDINIUM 1 PUFF: 62.5 AEROSOL, POWDER ORAL at 09:14

## 2024-05-23 RX ADMIN — ROPINIROLE HYDROCHLORIDE 2 MG: 2 TABLET, FILM COATED ORAL at 15:06

## 2024-05-23 RX ADMIN — ZOLPIDEM TARTRATE 5 MG: 5 TABLET, COATED ORAL at 03:50

## 2024-05-23 RX ADMIN — ACETAMINOPHEN 650 MG: 325 TABLET, FILM COATED ORAL at 18:22

## 2024-05-23 RX ADMIN — Medication 3 MG: at 21:53

## 2024-05-23 RX ADMIN — ALLOPURINOL 300 MG: 300 TABLET ORAL at 20:42

## 2024-05-23 RX ADMIN — Medication 500 MCG: at 09:15

## 2024-05-23 RX ADMIN — PANTOPRAZOLE SODIUM 40 MG: 40 TABLET, DELAYED RELEASE ORAL at 09:15

## 2024-05-23 ASSESSMENT — ACTIVITIES OF DAILY LIVING (ADL)
ADLS_ACUITY_SCORE: 43
HYGIENE/GROOMING: INDEPENDENT
ADLS_ACUITY_SCORE: 43
ORAL_HYGIENE: INDEPENDENT
ADLS_ACUITY_SCORE: 43
HYGIENE/GROOMING: INDEPENDENT
ADLS_ACUITY_SCORE: 43
DRESS: INDEPENDENT
DRESS: SCRUBS (BEHAVIORAL HEALTH);INDEPENDENT
ORAL_HYGIENE: INDEPENDENT
ADLS_ACUITY_SCORE: 43

## 2024-05-23 NOTE — PLAN OF CARE
"Pt did c/o pain to Rt shoulder 7/10 scheduled Oxycodone 5 mg was given with little relief. Pt requested if MD would adjust the number of times to get her Oxycodone \" for my pain to be well taken care of\".  Pt noted to be calm , denied anxiety at his time; but endorse depression at 5/0. Denied hallucinations , SI/SIB. Pt requested and given Ambien 5 mg for sleep aide; appears to have slept for 4.50 hours. Will continue to monitor and offer support.     Problem: Sleep Disturbance  Goal: Adequate Sleep/Rest  Outcome: Progressing   Goal Outcome Evaluation:                        "

## 2024-05-23 NOTE — PLAN OF CARE
Assessment/Intervention/Current Symtoms and Care Coordination:  Chart reviewed and patient met with team,   Discussed patient progress, symptomology, and response to treatment.  Discussed the discharge plan and any potential impediments to discharge.    Patient continuesto report depressive sx's- has been teary at times. Patient has been mostly in room today however per Nursing was out in lounge last night watching TV.   visited last evening.  Patient met with team- has multiple complaints re: pain, environment (bed etc)  Requesting nutrition consult to address diet.  Patient continues to wish to pursue ECT.  Patient has been cleared by Medicine. ECT consult completed.  Patient will likely start tomorrow.    Discharge Plan or Goal:  Patient will return home when stable  Psychiatry  55 Plus?       Barriers to Discharge:  Severity of depression/inability to function  Initiation of ECT    Referral Status:  None today    Legal Status:  Voluntary    Contacts:  Outpatient Psychiatrist: Jeannette [facility]    Primary Physician: Laith Constantino  Family Members: Justin Cleary (Spouse) [phone]      Upcoming Meetings and Dates/Important Information and next steps:  Team update:  Wed

## 2024-05-23 NOTE — PLAN OF CARE
BEH IP Unit Acuity Rating Score (UARS)  Patient is given one point for every criteria they meet.    CRITERIA SCORING   On a 72 hour hold, court hold, committed, stay of commitment, or revocation. 0    Patient LOS on BEH unit exceeds 20 days. 0  LOS: 2   Patient under guardianship, 55+, otherwise medically complex, or under age 11. 1   Suicide ideation without relief of precipitating factors. 1   Current plan for suicide. 0   Current plan for homicide. 0   Imminent risk or actual attempt to seriously harm another without relief of factors precipitating the attempt. 0   Severe dysfunction in daily living (ex: complete neglect for self care, extreme disruption in vegetative function, extreme deterioration in social interactions). 1   Recent (last 7 days) or current physical aggression in the ED or on unit. 0   Restraints or seclusion episode in past 72 hours. 0   Recent (last 7 days) or current verbal aggression, agitation, yelling, etc., while in the ED or unit. 0   Active psychosis. 0   Need for constant or near constant redirection (from leaving, from others, etc).  0   Intrusive or disruptive behaviors. 0   Patient requires 3 or more hours of individualized nursing care per 8-hour shift (i.e. for ADLs, meds, therapeutic interventions). 0   TOTAL 3

## 2024-05-23 NOTE — PLAN OF CARE
05/23/24 1500   General Information   Date Initially Attended OT 05/23/24     Pt has not attended scheduled occupational therapy sessions. Encourage attendance and participation. Pt will be given self-assessment form, and OT staff will explain the purpose of including them in their treatment plan and offer options for meeting their needs.

## 2024-05-23 NOTE — PLAN OF CARE
"Pt was visible in the milieu most of the shift. Pt was watching tv in the lounge with others. Pt was not engaging with peers. Pt presents with labile mood. Pt was calm and was smiling earlier in the shift but after 2000 pt was teary and able to say what was wrong. Pt went to her room and laid on bed after taking her medication and was observed crying. When writer asked pt what was going on pt said \"I never thought I would be here\".  Pt's feelings were validated. Pt endorsed anxiety 5/10 and depression 7/10. Denied for hallucination and suicidal thoughts. Pt was compliant with medications. Her  visited and visit went well. Will continue to monitor an offer support.    Goal Outcome Evaluation:  Problem: Adult Behavioral Health Plan of Care  Goal: Adheres to Safety Considerations for Self and Others  Intervention: Develop and Maintain Individualized Safety Plan  Recent Flowsheet Documentation  Taken 5/22/2024 1748 by Saul Perez, RN  Safety Measures:   safety rounds completed   environmental rounds completed  Goal: Absence of New-Onset Illness or Injury  Intervention: Identify and Manage Fall Risk  Recent Flowsheet Documentation  Taken 5/22/2024 1748 by Saul Perez, RN  Safety Measures:   safety rounds completed   environmental rounds completed                           "
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
## 2024-05-23 NOTE — PROGRESS NOTES
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 Deficiency  - Ascorbic Acid 1000mg    #Vitamin D Deficiency  - 250mcg weekly    #COPD  - Breo Ellipta 1 puff daily   - Incruse Ellipta 1 puff daily  - Tessalon cap 200mg TID prn  - Albuterol 2 puffs q6hr prn  - Mucinex 1200mg BID prn     #GERD  #Hiatal Hernia  - Protonix 40mg daily    #Possible pulmonary HTN  #HTN  Per Medicine note, pt follows with Cardiology here and has scheduled right heart catheterization for 06/12/2024. Has follow up with Cardiology in clinic scheduled for 06/19/2024. PTA edecrin 12.5mg daily and potassium supplement. Electrolytes and renal function stable.Recently lost 50lbs and BP has improved.   - Ethacrynic Acid 12.5mg qday   -Hold Edecrin on date of ECT, continue 12.5mg daily for now  -Continue potassium supplement   - Klorcon 20meq daily     #Hemochromatosis, hereditary  - Hgb Stable at ~15    #Hepatic Steatosis  - Stable  - LFTs wnl, no abdominal pain, distention, N/V    #History of breast cancer s/p  mastectomy, chemo and XRT:   - currently in remission      #Alcohol use disorder, in remission:   - No current use. Two years sober     #Hx of pheochromocytoma s/p left adrenalectomy 08/2017:   - Stable  - Follows Endocrinology for this.     #Psoriasis:  - Noted on R hang  - Uses scalp brush    Medical course: Patient was physically examined by the ED prior to being transferred to the unit and was found to be medically stable and appropriate for admission. Medicine consult was done to provide clearance for ECT. Due to patient's persistent neck pain after surgery in 2021, CT Head was done which was negative. Oxycodone changed from q6hr prn to scheduled with an extra 5mg in the day as needed per PTA regimen. Patient continued to express some nerve pain so gabapentin was increased to 400mg.      Consults:  none    Medical course: Patient was physically examined by the ED prior to being transferred to the unit and was found to be medically stable and appropriate for admission.     Consults: Medicine for ECT Clearance, ECT Consult, Nutrition     Checklist     Legal Status: Voluntary     Safety Assessment:   Behavioral Orders   Procedures    Code 1 - Restrict to Unit    Code 2 - 1:1 Staff Supervision     For coming down to ECT only    Discontinue 1:1 attendant for suicide risk     Order Specific Question:   I have performed an in person assessment of the patient     Answer:   Based on this assessment the patient no longer requires a one on one attendant at this point in time.     Order Specific Question:   Rationale     Answer:   Patient States able to remain safe in hospital     Order Specific Question:   Rationale     Answer:   Modifications to care environment made to mitigate safety risk     Order Specific Question:   Rationale     Answer:   Routine observations are sufficient to monitor safety.    Electroconvulsive therapy     Series of up to 12 treatments. Begin Date: 5/24/24     Treating Psychiatrist providing ECT:   Ruthann     Notified on:  5/21/24    Electroconvulsive therapy     Series of up to 12 treatments. Begin Date: 5/24/24     Treating Psychiatrist providing ECT:  Ruthann     Notified on:  5/21/24    Fall precautions    Routine Programming     As clinically indicated    Status 15     Every 15 minutes.    Suicide precautions: Suicide Risk: HIGH     Patients on Suicide Precautions should have a Combination Diet ordered that includes a Diet selection(s) AND a Behavioral Tray selection for Safe Tray - with utensils, or Safe Tray - NO utensils       Order Specific Question:   Suicide Risk     Answer:   HIGH       Risk Assessment:  Risk for harm is moderate-high.  Risk factors: SI, maladaptive coping, trauma, and past behaviors  Protective factors: engaged in treatment     SIO: Discontinued    Disposition: Pending stabilization, medication optimization, & development of a safe discharge plan.     Attestations   Reina Vallecillo, MS3    This patient was seen and discussed with my attending physician.     I was present with the medical student who participated in the service and documentation of the note. I have verified the history and personally performed the physical/mental status exam and medical decision making. I agree with the assessment and plan documented in the note.     Tejinder Correa  PGY-2 Psychiatry Resident  [facility]     Attestation:  This patient has been seen and evaluated by me, Jairo Choudhary MD.  I have discussed this patient with the house staff team including the resident and medical student and I agree with the findings and plan in this note.    I have reviewed today's vital signs, medications, labs and imaging. Jairo Choudhary MD , PhD.

## 2024-05-23 NOTE — PLAN OF CARE
"At the beginning of the shift, pt  was clearly anxious. She was hyper verbal, complaining about a lot of things - her bed, her shoes, her neck. Pt was reassured and when she came out of her room, she was distracted with the activities in the milieu. She was pleasant and cooperative, medication compliant. Oral and fluid intake was adequate. Pt interacts with peers appropriately. She was observed to be on the phone a couple of times. Pt complains of pain and scheduled medication given. No grimacing was observed during the shift. Pt declined levothyroxine in the morning and said that she is allergic to the medication fillers of the said medication. Team updated about it. New orders were made.  No other behavioral concerns this shift.       Problem: Sleep Disturbance  Goal: Adequate Sleep/Rest  Outcome: Not Progressing     Problem: Adult Inpatient Plan of Care  Goal: Patient-Specific Goal (Individualized)  Description: You can add care plan individualizations to a care plan. Examples of Individualization might be:  \"Parent requests to be called daily at 9am for status\", \"I have a hard time hearing out of my right ear\", or \"Do not touch me to wake me up as it startles  me\".  Outcome: Progressing     Problem: Adult Behavioral Health Plan of Care  Goal: Adheres to Safety Considerations for Self and Others  Outcome: Progressing  Intervention: Develop and Maintain Individualized Safety Plan  Recent Flowsheet Documentation  Taken 5/23/2024 1500 by Yoselin Garcia RN  Safety Measures:   safety rounds completed   environmental rounds completed     Problem: Suicide Risk  Goal: Absence of Self-Harm  Outcome: Progressing     Problem: Depression  Goal: Improved Mood  Outcome: Progressing     Problem: Suicidal Behavior  Goal: Suicidal Behavior is Absent or Managed  Outcome: Progressing   Goal Outcome Evaluation:                        " no smoking or alcohol use   no substance use

## 2024-05-23 NOTE — PROGRESS NOTES
"CLINICAL NUTRITION SERVICES - ASSESSMENT NOTE     Nutrition Prescription    RECOMMENDATIONS FOR MDs/PROVIDERS TO ORDER:  None at this time    Malnutrition Status:    Patient does not meet two of the established criteria necessary for diagnosing malnutrition    Recommendations already ordered by Registered Dietitian (RD):  -Double portions - pt will self-select on menu as needed  -Handout: Purine-Restricted Nutrition Therapy    Future/Additional Recommendations:  RD to sign off at this time, but will remain available by consult if new nutrition problem arises.       REASON FOR ASSESSMENT  Randi Cleary is a/an 68 year old female assessed by the dietitian for Provider Order - Patient has gout. What recommendations for food do you recommend?     CLINICAL HISTORY  PMH significant for MDD, SIMD, unspecified trauma, CHAVO, likely borderline PD and AUD. Admitted from ED 5/21/24 due to concern for SI in the context of new life stressors including loss of her therapist and continued marital strife.     NUTRITION HISTORY  RD met with Winnie at bedside who reports current flare up of Gout in bilateral upper and lower extremities. Writer provided handout on low-purine diet and discussed recommended diet modifications including increasing fluid intake and decreasing intake of animal proteins. Pt expressed understanding of education and is requesting to be approved for double portions of low-purine foods as needed.     GI: Pt denied N/V/D/C     CURRENT NUTRITION ORDERS  Diet: Regular  Supplement: none  Intake/Tolerance: eating and drinking adequately per RN notes    LABS  Uric acid WNL (3/29/24)    MEDICATIONS  Ascorbic acid, vit D3, vit B12, edecrin, mag-ox, oxycodone, protonix, klor-con, requip    ANTHROPOMETRICS  Height: 167.6 cm (5' 6\")  Most Recent Weight: 88.6 kg (195 lb 4.8 oz)    IBW: 59.1 kg   BMI: Obesity Grade I BMI 30-34.9  Weight History:   Wt Readings from Last 15 Encounters:   05/21/24 88.6 kg (195 lb 4.8 oz) "   03/20/24 93 kg (205 lb)   02/07/24 93 kg (205 lb)   01/30/24 93.4 kg (206 lb)   01/23/24 94.7 kg (208 lb 11.2 oz)   01/03/24 98 kg (216 lb)   12/29/23 97.1 kg (214 lb)   12/19/23 96.6 kg (213 lb)   12/11/23 97.1 kg (214 lb)   12/04/23 98.7 kg (217 lb 9.6 oz)   10/19/23 101.1 kg (222 lb 12.8 oz)   10/18/23 100.2 kg (221 lb)   09/28/23 104.8 kg (231 lb)   09/27/23 104.7 kg (230 lb 12.8 oz)   09/25/23 103.9 kg (229 lb)   4.7% wt loss in <3 months    Dosing Weight: 66 kg (adjusted)    ASSESSED NUTRITION NEEDS  Estimated Energy Needs: 1650 - 1980 kcals/day (25 - 30 kcals/kg)  Justification: Maintenance  Estimated Protein Needs: 88 - 105 grams protein/day (1 - 1.2 grams of pro/kg)  Justification: Maintenance  Estimated Fluid Needs: 1 mL/kcal  Justification: Maintenance or Per Provider    PHYSICAL FINDINGS  See malnutrition section below.  Osorio: 19  No abnormal nutrition-related physical findings observed.     MALNUTRITION  % Intake: No decreased intake noted  % Weight Loss: Weight loss does not meet criteria  Subcutaneous Fat Loss: None observed  Muscle Loss: None observed  Fluid Accumulation/Edema: None noted  Malnutrition Diagnosis: Patient does not meet two of the established criteria necessary for diagnosing malnutrition    NUTRITION DIAGNOSIS  No nutrition diagnosis at this time     INTERVENTIONS  Implementation  Nutrition Education: RD role in care   Modify composition of meals/snacks     Goals  Patient to consume % of nutritionally adequate meal trays TID, or the equivalent with supplements/snacks.     Monitoring/Evaluation  RD to sign off at this time, but will remain available by consult if new nutrition problem arises.      Shakira Wetzel, MPH, RDN, LD  Behavioral Health Adult & Pediatric Dietitian  Contact via [employer] or Desk Phone: [phone]

## 2024-05-24 ENCOUNTER — APPOINTMENT (OUTPATIENT)
Dept: BEHAVIORAL HEALTH | Facility: CLINIC | Age: 69
DRG: 881 | End: 2024-05-24
Payer: MEDICARE

## 2024-05-24 ENCOUNTER — ANESTHESIA EVENT (OUTPATIENT)
Dept: BEHAVIORAL HEALTH | Facility: CLINIC | Age: 69
DRG: 881 | End: 2024-05-24
Payer: MEDICARE

## 2024-05-24 ENCOUNTER — ANESTHESIA (OUTPATIENT)
Dept: BEHAVIORAL HEALTH | Facility: CLINIC | Age: 69
DRG: 881 | End: 2024-05-24
Payer: MEDICARE

## 2024-05-24 LAB
ATRIAL RATE - MUSE: 71 BPM
DIASTOLIC BLOOD PRESSURE - MUSE: NORMAL MMHG
INTERPRETATION ECG - MUSE: NORMAL
P AXIS - MUSE: 83 DEGREES
PR INTERVAL - MUSE: 186 MS
QRS DURATION - MUSE: 124 MS
QT - MUSE: 422 MS
QTC - MUSE: 458 MS
R AXIS - MUSE: 72 DEGREES
SYSTOLIC BLOOD PRESSURE - MUSE: NORMAL MMHG
T AXIS - MUSE: 40 DEGREES
VENTRICULAR RATE- MUSE: 71 BPM

## 2024-05-24 PROCEDURE — 250N000013 HC RX MED GY IP 250 OP 250 PS 637

## 2024-05-24 PROCEDURE — 250N000011 HC RX IP 250 OP 636: Performed by: NURSE ANESTHETIST, CERTIFIED REGISTERED

## 2024-05-24 PROCEDURE — 250N000013 HC RX MED GY IP 250 OP 250 PS 637: Performed by: PHYSICIAN ASSISTANT

## 2024-05-24 PROCEDURE — 99232 SBSQ HOSP IP/OBS MODERATE 35: CPT | Mod: 25 | Performed by: PSYCHIATRY & NEUROLOGY

## 2024-05-24 PROCEDURE — 124N000002 HC R&B MH UMMC

## 2024-05-24 PROCEDURE — 90870 ELECTROCONVULSIVE THERAPY: CPT | Performed by: PSYCHIATRY & NEUROLOGY

## 2024-05-24 PROCEDURE — 90870 ELECTROCONVULSIVE THERAPY: CPT | Performed by: ANESTHESIOLOGY

## 2024-05-24 PROCEDURE — 370N000017 HC ANESTHESIA TECHNICAL FEE, PER MIN

## 2024-05-24 PROCEDURE — 90870 ELECTROCONVULSIVE THERAPY: CPT

## 2024-05-24 PROCEDURE — 250N000009 HC RX 250: Performed by: NURSE ANESTHETIST, CERTIFIED REGISTERED

## 2024-05-24 PROCEDURE — 90870 ELECTROCONVULSIVE THERAPY: CPT | Performed by: NURSE ANESTHETIST, CERTIFIED REGISTERED

## 2024-05-24 RX ORDER — DEXAMETHASONE SODIUM PHOSPHATE 4 MG/ML
4 INJECTION, SOLUTION INTRA-ARTICULAR; INTRALESIONAL; INTRAMUSCULAR; INTRAVENOUS; SOFT TISSUE
Status: CANCELLED | OUTPATIENT
Start: 2024-05-24

## 2024-05-24 RX ORDER — ONDANSETRON 4 MG/1
4 TABLET, ORALLY DISINTEGRATING ORAL EVERY 30 MIN PRN
Status: CANCELLED | OUTPATIENT
Start: 2024-05-24

## 2024-05-24 RX ORDER — ONDANSETRON 2 MG/ML
4 INJECTION INTRAMUSCULAR; INTRAVENOUS EVERY 30 MIN PRN
Status: CANCELLED | OUTPATIENT
Start: 2024-05-24

## 2024-05-24 RX ORDER — NALOXONE HYDROCHLORIDE 0.4 MG/ML
0.1 INJECTION, SOLUTION INTRAMUSCULAR; INTRAVENOUS; SUBCUTANEOUS
Status: CANCELLED | OUTPATIENT
Start: 2024-05-24

## 2024-05-24 RX ORDER — OXYCODONE HYDROCHLORIDE 5 MG/1
5 TABLET ORAL ONCE
Status: COMPLETED | OUTPATIENT
Start: 2024-05-24 | End: 2024-05-24

## 2024-05-24 RX ORDER — ACETAMINOPHEN 325 MG/1
975 TABLET ORAL ONCE
Status: CANCELLED | OUTPATIENT
Start: 2024-05-24 | End: 2024-05-24

## 2024-05-24 RX ORDER — SODIUM CHLORIDE, SODIUM LACTATE, POTASSIUM CHLORIDE, CALCIUM CHLORIDE 600; 310; 30; 20 MG/100ML; MG/100ML; MG/100ML; MG/100ML
INJECTION, SOLUTION INTRAVENOUS CONTINUOUS
Status: CANCELLED | OUTPATIENT
Start: 2024-05-24

## 2024-05-24 RX ORDER — OXYCODONE HYDROCHLORIDE 5 MG/1
5 TABLET ORAL
Status: CANCELLED | OUTPATIENT
Start: 2024-05-24

## 2024-05-24 RX ORDER — OXYCODONE HYDROCHLORIDE 5 MG/1
5 TABLET ORAL DAILY PRN
Status: DISCONTINUED | OUTPATIENT
Start: 2024-05-24 | End: 2024-06-22 | Stop reason: HOSPADM

## 2024-05-24 RX ORDER — FENTANYL CITRATE 50 UG/ML
25 INJECTION, SOLUTION INTRAMUSCULAR; INTRAVENOUS EVERY 5 MIN PRN
Status: CANCELLED | OUTPATIENT
Start: 2024-05-24

## 2024-05-24 RX ORDER — METHOHEXITAL IN WATER/PF 100MG/10ML
SYRINGE (ML) INTRAVENOUS PRN
Status: DISCONTINUED | OUTPATIENT
Start: 2024-05-24 | End: 2024-05-24

## 2024-05-24 RX ORDER — HYDROMORPHONE HCL IN WATER/PF 6 MG/30 ML
0.4 PATIENT CONTROLLED ANALGESIA SYRINGE INTRAVENOUS EVERY 5 MIN PRN
Status: CANCELLED | OUTPATIENT
Start: 2024-05-24

## 2024-05-24 RX ORDER — OXYCODONE HYDROCHLORIDE 5 MG/1
5 TABLET ORAL EVERY 6 HOURS
Status: DISCONTINUED | OUTPATIENT
Start: 2024-05-25 | End: 2024-06-22 | Stop reason: HOSPADM

## 2024-05-24 RX ORDER — HYDROMORPHONE HCL IN WATER/PF 6 MG/30 ML
0.2 PATIENT CONTROLLED ANALGESIA SYRINGE INTRAVENOUS EVERY 5 MIN PRN
Status: CANCELLED | OUTPATIENT
Start: 2024-05-24

## 2024-05-24 RX ORDER — FENTANYL CITRATE 50 UG/ML
50 INJECTION, SOLUTION INTRAMUSCULAR; INTRAVENOUS EVERY 5 MIN PRN
Status: CANCELLED | OUTPATIENT
Start: 2024-05-24

## 2024-05-24 RX ORDER — OXYCODONE HYDROCHLORIDE 5 MG/1
10 TABLET ORAL
Status: CANCELLED | OUTPATIENT
Start: 2024-05-24

## 2024-05-24 RX ORDER — NICARDIPINE HCL-0.9% SOD CHLOR 1 MG/10 ML
SYRINGE (ML) INTRAVENOUS PRN
Status: DISCONTINUED | OUTPATIENT
Start: 2024-05-24 | End: 2024-05-24

## 2024-05-24 RX ADMIN — OXYCODONE HYDROCHLORIDE 5 MG: 5 TABLET ORAL at 18:04

## 2024-05-24 RX ADMIN — LEVOTHYROXINE SODIUM 150 MCG: 75 TABLET ORAL at 09:36

## 2024-05-24 RX ADMIN — ALLOPURINOL 300 MG: 300 TABLET ORAL at 20:14

## 2024-05-24 RX ADMIN — OXYCODONE HYDROCHLORIDE 5 MG: 5 TABLET ORAL at 06:06

## 2024-05-24 RX ADMIN — OXYCODONE HYDROCHLORIDE 5 MG: 5 TABLET ORAL at 23:21

## 2024-05-24 RX ADMIN — UMECLIDINIUM 1 PUFF: 62.5 AEROSOL, POWDER ORAL at 08:32

## 2024-05-24 RX ADMIN — ZOLPIDEM TARTRATE 5 MG: 5 TABLET, COATED ORAL at 00:56

## 2024-05-24 RX ADMIN — PANTOPRAZOLE SODIUM 40 MG: 40 TABLET, DELAYED RELEASE ORAL at 14:07

## 2024-05-24 RX ADMIN — Medication 90 MG: at 13:14

## 2024-05-24 RX ADMIN — SUCCINYLCHOLINE CHLORIDE 90 MG: 20 INJECTION, SOLUTION INTRAMUSCULAR; INTRAVENOUS; PARENTERAL at 13:16

## 2024-05-24 RX ADMIN — Medication 1000 MG: at 14:07

## 2024-05-24 RX ADMIN — FLUTICASONE FUROATE AND VILANTEROL TRIFENATATE 1 PUFF: 100; 25 POWDER RESPIRATORY (INHALATION) at 08:32

## 2024-05-24 RX ADMIN — ROPINIROLE HYDROCHLORIDE 2 MG: 2 TABLET, FILM COATED ORAL at 23:10

## 2024-05-24 RX ADMIN — Medication 10 MG: at 13:18

## 2024-05-24 RX ADMIN — OXYCODONE HYDROCHLORIDE 5 MG: 5 TABLET ORAL at 14:08

## 2024-05-24 RX ADMIN — GABAPENTIN 400 MG: 400 CAPSULE ORAL at 22:16

## 2024-05-24 RX ADMIN — ROPINIROLE HYDROCHLORIDE 2 MG: 2 TABLET, FILM COATED ORAL at 14:07

## 2024-05-24 RX ADMIN — Medication 12.5 MG: at 14:09

## 2024-05-24 RX ADMIN — Medication 500 MCG: at 13:23

## 2024-05-24 RX ADMIN — ROPINIROLE HYDROCHLORIDE 2 MG: 2 TABLET, FILM COATED ORAL at 20:14

## 2024-05-24 RX ADMIN — ROPINIROLE HYDROCHLORIDE 2 MG: 2 TABLET, FILM COATED ORAL at 01:00

## 2024-05-24 RX ADMIN — ACETAMINOPHEN 650 MG: 325 TABLET, FILM COATED ORAL at 04:58

## 2024-05-24 RX ADMIN — MAGNESIUM OXIDE TAB 400 MG (241.3 MG ELEMENTAL MG) 400 MG: 400 (241.3 MG) TAB at 20:14

## 2024-05-24 RX ADMIN — POTASSIUM CHLORIDE 20 MEQ: 1500 TABLET, EXTENDED RELEASE ORAL at 14:07

## 2024-05-24 RX ADMIN — Medication 500 MCG: at 14:07

## 2024-05-24 RX ADMIN — ACETAMINOPHEN 650 MG: 325 TABLET, FILM COATED ORAL at 20:17

## 2024-05-24 RX ADMIN — OXYCODONE HYDROCHLORIDE 5 MG: 5 TABLET ORAL at 09:35

## 2024-05-24 RX ADMIN — MAGNESIUM OXIDE TAB 400 MG (241.3 MG ELEMENTAL MG) 400 MG: 400 (241.3 MG) TAB at 14:07

## 2024-05-24 RX ADMIN — OXYCODONE HYDROCHLORIDE 5 MG: 5 TABLET ORAL at 00:56

## 2024-05-24 ASSESSMENT — ACTIVITIES OF DAILY LIVING (ADL)
ADLS_ACUITY_SCORE: 43
ADLS_ACUITY_SCORE: 43
DRESS: STREET CLOTHES
ORAL_HYGIENE: INDEPENDENT
DRESS: INDEPENDENT
ADLS_ACUITY_SCORE: 43
ADLS_ACUITY_SCORE: 43
LAUNDRY: WITH SUPERVISION
ADLS_ACUITY_SCORE: 43
HYGIENE/GROOMING: INDEPENDENT
ADLS_ACUITY_SCORE: 43
LAUNDRY: WITH SUPERVISION
ADLS_ACUITY_SCORE: 43
HYGIENE/GROOMING: INDEPENDENT
ADLS_ACUITY_SCORE: 43

## 2024-05-24 ASSESSMENT — COPD QUESTIONNAIRES: COPD: 1

## 2024-05-24 NOTE — PLAN OF CARE
Problem: Sleep Disturbance  Goal: Adequate Sleep/Rest  Outcome: Progressing   Goal Outcome Evaluation:  Patient was awake intermittently tonight. Was observed to have slept for 3.75 hours. Pain managed with scheduled Oxycodone and prn Tylenol x1 tonight. Intermittently teary for brief periods.  ECT this morning, no specific time scheduled yet. Safety checks in place.

## 2024-05-24 NOTE — PROGRESS NOTES
Pre-op physical, labs and EKG reviewed by Dr. Elsa HAWLEY and approved for treatment in ECT suite today.

## 2024-05-24 NOTE — ANESTHESIA POSTPROCEDURE EVALUATION
Patient: Randi Cleary    Procedure: * No procedures listed *       Anesthesia Type:  General    Note:  Disposition: Outpatient   Postop Pain Control: Uneventful            Sign Out: Well controlled pain   PONV: No   Neuro/Psych: Uneventful            Sign Out: Acceptable/Baseline neuro status   Airway/Respiratory: Uneventful            Sign Out: Acceptable/Baseline resp. status   CV/Hemodynamics: Uneventful            Sign Out: Acceptable CV status; No obvious hypovolemia; No obvious fluid overload   Other NRE: NONE   DID A NON-ROUTINE EVENT OCCUR? No       Last vitals:  Vitals:    05/24/24 1325 05/24/24 1335 05/24/24 1345   BP: (!) 146/81 122/77 124/78   Pulse: 105 92 96   Resp: 20 18 16   Temp: 36.7  C (98.1  F) 36.8  C (98.2  F) 36.1  C (97  F)   SpO2: 93% 93% 93%       Electronically Signed By: Jocelyn Hunter MD  May 24, 2024  1:51 PM

## 2024-05-24 NOTE — PLAN OF CARE
BEH IP Unit Acuity Rating Score (UARS)  Patient is given one point for every criteria they meet.     CRITERIA SCORING   On a 72 hour hold, court hold, committed, stay of commitment, or revocation. 0    Patient LOS on BEH unit exceeds 20 days. 0  LOS: 3   Patient under guardianship, 55+, otherwise medically complex, or under age 11. 1   Suicide ideation without relief of precipitating factors. 1   Current plan for suicide. 0   Current plan for homicide. 0   Imminent risk or actual attempt to seriously harm another without relief of factors precipitating the attempt. 0   Severe dysfunction in daily living (ex: complete neglect for self care, extreme disruption in vegetative function, extreme deterioration in social interactions). 1   Recent (last 7 days) or current physical aggression in the ED or on unit. 0   Restraints or seclusion episode in past 72 hours. 0   Recent (last 7 days) or current verbal aggression, agitation, yelling, etc., while in the ED or unit. 0   Active psychosis. 0   Need for constant or near constant redirection (from leaving, from others, etc).  0   Intrusive or disruptive behaviors. 0   Patient requires 3 or more hours of individualized nursing care per 8-hour shift (i.e. for ADLs, meds, therapeutic interventions). 0   TOTAL 3

## 2024-05-24 NOTE — PLAN OF CARE
"Goal Outcome Evaluation:    At the start of shift pt was anxious and hyper-verbal but calmed down as the shift was progressed. Pt required a lot of reassurance about her behavior and ECT tomorrow. Pt was med complaint. She complained of pain and couldn't seem to understand her med regimen as writer was explaining it. PRNs given. Endorsed depression and anxiety saying it's her \"normal\". Pt declined all mental health symptoms including SI,HI,SIB, paranoia and AVC hallucinations. She committed to safe behaviors. Pt was given a patient care order for some personal items but they need to be returned after use or put in cup. Pain was a 2/10 at the end of shift. Nutrition and hydration was adequate.         /82 (BP Location: Right arm)   Pulse 91   Temp 98  F (36.7  C) (Oral)   Resp 18   Ht 1.676 m (5' 6\")   Wt 88.6 kg (195 lb 4.8 oz)   LMP  (LMP Unknown)   SpO2 96%   BMI 31.52 kg/m         Last 24H PRN:     acetaminophen (TYLENOL) tablet 650 mg, 650 mg at 05/23/24 1822    melatonin tablet 3 mg, 3 mg at 05/23/24 2153    menthol (Topical Analgesic) 2.5% (BENGAY VANISHING SCENT) 2.5 % topical gel, Given at 05/23/24 1329    oxyCODONE (ROXICODONE) tablet 5 mg, 5 mg at 05/23/24 1816 **AND** [COMPLETED] oxyCODONE (ROXICODONE) tablet 5 mg, 5 mg at 05/21/24 2320 **AND** oxyCODONE (ROXICODONE) tablet 5 mg, 5 mg at 05/23/24 2153    zolpidem (AMBIEN) tablet 5 mg, 5 mg at 05/23/24 0350     "

## 2024-05-24 NOTE — ANESTHESIA CARE TRANSFER NOTE
Patient: Randi Cleary    Procedure: * No procedures listed *       Diagnosis: * No pre-op diagnosis entered *  Diagnosis Additional Information: No value filed.    Anesthesia Type:   General     Note:    Oropharynx: oropharynx clear of all foreign objects and spontaneously breathing  Level of Consciousness: drowsy  Oxygen Supplementation: room air    Independent Airway: airway patency satisfactory and stable  Dentition: dentition unchanged  Vital Signs Stable: post-procedure vital signs reviewed and stable  Report to RN Given: handoff report given  Patient transferred to: PACU    Handoff Report: Identifed the Patient, Identified the Reponsible Provider, Reviewed the pertinent medical history, Discussed the surgical course, Reviewed Intra-OP anesthesia mangement and issues during anesthesia, Set expectations for post-procedure period and Allowed opportunity for questions and acknowledgement of understanding  Vitals:  Vitals Value Taken Time   /78 05/24/24 1345   Temp 36.1  C (97  F) 05/24/24 1345   Pulse 96 05/24/24 1345   Resp 16 05/24/24 1345   SpO2 93 % 05/24/24 1345       Electronically Signed By: Jocelyn Hunter MD  May 24, 2024  1:51 PM

## 2024-05-24 NOTE — PLAN OF CARE
Pt is intermittently visible in the milieu, did not attend groups and tends to be socially withdrawn. She is often tearful and has many concerns/complaints related to her physical health. Arranged for pt to have medical bed on unit d/t not being able to sleep on platform beds. Pt also upset with menu options d/t dx of gout and lack of low-purine foods available; dietician spoke with pt and diet was changed to vegetarian. She endorses anxiety and depression, denies other mental health symptoms. She verbalizes feeling anxious for ECT, reassurance provided. She is med compliant, received PRN oxycodone x1. Pt had first ECT session today, reported feeling emotional afterwards. Pt was not able to eat much of the day d/t being NPO for ECT, hygiene is adequate.    Problem: Depression  Goal: Improved Mood  Outcome: Not Progressing   Goal Outcome Evaluation:    Plan of Care Reviewed With: patient
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
## 2024-05-24 NOTE — PROGRESS NOTES
Patients VSS, A/O, IV removed, meets phase 2 criteria and is able to return to station 20 at this time. Report given to unit RN. Patient transported back to unit with staff via wheelchair.

## 2024-05-24 NOTE — PROGRESS NOTES
"  ----------------------------------------------------------------------------------------------------------  [facility]  Psychiatry Progress Note  Hospital Day #3     Interim History:     The patient's care was discussed with the treatment team and chart notes were reviewed.    Vitals: VSS  Sleep: 4.5 hours (05/23/24 0600)  Scheduled medications: Took all scheduled medications as prescribed  Psychiatric PRN medications:   Last 24H PRN:     acetaminophen (TYLENOL) tablet 650 mg, 650 mg at 05/22/24 1712    melatonin tablet 3 mg, 3 mg at 05/22/24 2258    menthol (Topical Analgesic) 2.5% (BENGAY VANISHING SCENT) 2.5 % topical gel, Given at 05/22/24 2019    oxyCODONE (ROXICODONE) tablet 5 mg, 5 mg at 05/22/24 2258    zolpidem (AMBIEN) tablet 5 mg, 5 mg at 05/23/24 0350     Staff Report:  No acute events overnight. No acute safety concerns. First session and ECT today.  See staff note for additional details.        Subjective:     Patient Interview:  Randi Cleary was seen prior to leaving for ECT. She states she is hoping that it will work to help relieve her symptoms. She also reports she is hoping to receive the medical bed to help address her chronic pain. She expresses TMS initally worked well for her but then stopped working and hoping  ECT has a longer lasting effect. No further concerns expressed.     ROS:  Patient has pain in her neck and shoulder.  Patient denies acute concerns     Objective:     Vitals:  /82 (BP Location: Right arm)   Pulse (!) 18   Temp 97.8  F (36.6  C)   Resp 18   Ht 1.676 m (5' 6\")   Wt 88.6 kg (195 lb 4.8 oz)   LMP  (LMP Unknown)   SpO2 94%   BMI 31.52 kg/m      Allergies:  Allergies   Allergen Reactions    Serotonin Reuptake Inhibitors (Ssris) Anxiety, Difficulty breathing, Headache, Palpitations and Shortness Of Breath    Buspirone      The patient states she had serotonin syndrome    Cephalexin      Other reaction(s): " "unknown rxn.    Desvenlafaxine      Serotonin syndrome    Diclofenac Sodium [Diclofenac]      Serotonin syndrome and restless legs syndrome    Gabapentin      Drove on the wrong side of the highway    Levofloxacin      \"CAN'T REMEMBER\"    Penicillins      \"SORES IN MOUTH\"    Riluzole Difficulty breathing and Swelling    Sulfa Antibiotics      \"PT DOES NOT KNOW WHAT THE REACTION WAS\"    Topiramate Other (See Comments)     Frequent urination       Current Medications:  Scheduled:  Current Facility-Administered Medications   Medication Dose Route Frequency Provider Last Rate Last Admin    acetaminophen (TYLENOL) tablet 650 mg  650 mg Oral Q4H PRN Wilner Parker MD   650 mg at 05/24/24 0458    albuterol (PROVENTIL HFA/VENTOLIN HFA) inhaler  2 puff Inhalation Q6H PRN Wilner Parker MD        allopurinol (ZYLOPRIM) tablet 300 mg  300 mg Oral QPM Nallely Barber MD   300 mg at 05/23/24 2042    alum & mag hydroxide-simethicone (MAALOX) suspension 30 mL  30 mL Oral Q4H PRN Wilner Parker MD        ascorbic acid tablet 1,000 mg  1,000 mg Oral Daily Wilner Parker MD   1,000 mg at 05/23/24 0915    benzonatate (TESSALON) capsule 200 mg  200 mg Oral TID PRN Wilner Parker MD   200 mg at 05/21/24 2300    cholecalciferol (VITAMIN D3) capsule 250 mcg  250 mcg Oral Weekly Wilner Parker MD   250 mcg at 05/22/24 0849    cyanocobalamin (VITAMIN B-12) sublingual tablet 500 mcg  500 mcg Sublingual Daily Wilner Parker MD   500 mcg at 05/23/24 0915    ethacrynic acid (EDECRIN) half-tab 12.5 mg  12.5 mg Oral Daily Wilner Parker MD   12.5 mg at 05/23/24 1044    fluticasone-vilanterol (BREO ELLIPTA) 100-25 MCG/ACT inhaler 1 puff  1 puff Inhalation Daily Wilner Parker MD   1 puff at 05/24/24 0832    And    umeclidinium (INCRUSE ELLIPTA) 62.5 MCG/ACT inhaler 1 puff  1 puff Inhalation Daily Wilner Parker MD   1 puff at 05/24/24 0832    gabapentin (NEURONTIN) capsule 100 mg  100 mg Oral Q6H PRN " Wilner Parker MD        gabapentin (NEURONTIN) capsule 400 mg  400 mg Oral At Bedtime Tejinder Correa MD        guaiFENesin (MUCINEX) 12 hr tablet 1,200 mg  1,200 mg Oral BID PRN Wilner Parker MD        Hold Medications for ECT (select and list medications to be held)   Does not apply HOLD Teijnder Correa MD        Hold Medications for ECT (select and list medications to be held)   Does not apply HOLD Boo Riley MD        magnesium oxide (MAG-OX) tablet 400 mg  400 mg Oral BID Anna Brewer PA-C   400 mg at 05/23/24 2041    melatonin tablet 3 mg  3 mg Oral At Bedtime PRN Wilner Parker MD   3 mg at 05/23/24 2153    menthol (Topical Analgesic) 2.5% (BENGAY VANISHING SCENT) 2.5 % topical gel   Topical Q6H PRN Anna Brewer PA-C   Given at 05/23/24 1329    naloxone (NARCAN) injection 0.2 mg  0.2 mg Intravenous Q2 Min PRN Wilner Parker MD        Or    naloxone (NARCAN) injection 0.4 mg  0.4 mg Intravenous Q2 Min PRN Wilner Parker MD        Or    naloxone (NARCAN) injection 0.2 mg  0.2 mg Intramuscular Q2 Min PRN Wilner Parker MD        Or    naloxone (NARCAN) injection 0.4 mg  0.4 mg Intramuscular Q2 Min PRN Wilner Parker MD        OLANZapine (zyPREXA) tablet 2.5 mg  2.5 mg Oral TID PRN Wilner Parker MD        Or    OLANZapine (zyPREXA) injection 2.5 mg  2.5 mg Intramuscular TID PRN Wilner Parker MD        ondansetron (ZOFRAN ODT) ODT tab 4 mg  4 mg Oral Q6H PRN Wilner Parker MD        oxyCODONE (ROXICODONE) tablet 5 mg  5 mg Oral Q6H Tejinder Correa MD   5 mg at 05/24/24 0606    And    oxyCODONE (ROXICODONE) tablet 5 mg  5 mg Oral Daily PRN Tejinder Correa MD   5 mg at 05/23/24 2153    pantoprazole (PROTONIX) EC tablet 40 mg  40 mg Oral Daily Wilner Parker MD   40 mg at 05/23/24 0915    polyethylene glycol (MIRALAX) Packet 17 g  17 g Oral Daily PRN Wilner Parker MD        potassium chloride jeannette ER (KLOR-CON M20) CR tablet 20 mEq  20 mEq  Oral Daily Wilner Parker MD   20 mEq at 05/23/24 0915    rOPINIRole (REQUIP) tablet 2 mg  2 mg Oral TID Nallely Barber MD   2 mg at 05/24/24 0100    sodium chloride (OCEAN) 0.65 % nasal spray 1 spray  1 spray Both Nostrils Q1H PRN Anna Brewer PA-C        SYNTHROID (Brand only - 75 mcg strength tablets)  150 mcg Oral Once per day on Monday Tuesday Wednesday Thursday Friday Saturday Tejinder Correa MD   150 mcg at 05/22/24 0710    And    [START ON 5/26/2024] SYNTHROID tablet 75 mcg (brand only)  75 mcg Oral Every Sunday Tejinder Correa MD        zolpidem (AMBIEN) tablet 5 mg  5 mg Oral At Bedtime PRN Wilner Parker MD   5 mg at 05/24/24 0056       PRN:  Current Facility-Administered Medications   Medication Dose Route Frequency Provider Last Rate Last Admin    acetaminophen (TYLENOL) tablet 650 mg  650 mg Oral Q4H PRN Wilner Parker MD   650 mg at 05/24/24 0458    albuterol (PROVENTIL HFA/VENTOLIN HFA) inhaler  2 puff Inhalation Q6H PRN Wilner Parker MD        allopurinol (ZYLOPRIM) tablet 300 mg  300 mg Oral QPM Nallely Barber MD   300 mg at 05/23/24 2042    alum & mag hydroxide-simethicone (MAALOX) suspension 30 mL  30 mL Oral Q4H PRN Wilner Parker MD        ascorbic acid tablet 1,000 mg  1,000 mg Oral Daily Wilner Parker MD   1,000 mg at 05/23/24 0915    benzonatate (TESSALON) capsule 200 mg  200 mg Oral TID PRN Wilner Parker MD   200 mg at 05/21/24 2300    cholecalciferol (VITAMIN D3) capsule 250 mcg  250 mcg Oral Weekly Wilner Parker MD   250 mcg at 05/22/24 0849    cyanocobalamin (VITAMIN B-12) sublingual tablet 500 mcg  500 mcg Sublingual Daily Wilner Parker MD   500 mcg at 05/23/24 0915    ethacrynic acid (EDECRIN) half-tab 12.5 mg  12.5 mg Oral Daily Wilner Parker MD   12.5 mg at 05/23/24 1044    fluticasone-vilanterol (BREO ELLIPTA) 100-25 MCG/ACT inhaler 1 puff  1 puff Inhalation Daily Wilner Parker MD   1 puff at 05/24/24 0832    And     umeclidinium (INCRUSE ELLIPTA) 62.5 MCG/ACT inhaler 1 puff  1 puff Inhalation Daily Wilner Parker MD   1 puff at 05/24/24 0832    gabapentin (NEURONTIN) capsule 100 mg  100 mg Oral Q6H PRN Wilner Parker MD        gabapentin (NEURONTIN) capsule 400 mg  400 mg Oral At Bedtime Tejinder Correa MD        guaiFENesin (MUCINEX) 12 hr tablet 1,200 mg  1,200 mg Oral BID PRN Wilner Parker MD        Hold Medications for ECT (select and list medications to be held)   Does not apply HOLD Tejinder Correa MD        Hold Medications for ECT (select and list medications to be held)   Does not apply HOLD Boo Riley MD        magnesium oxide (MAG-OX) tablet 400 mg  400 mg Oral BID Anna Brewer PA-C   400 mg at 05/23/24 2041    melatonin tablet 3 mg  3 mg Oral At Bedtime PRN Wilner Parker MD   3 mg at 05/23/24 2153    menthol (Topical Analgesic) 2.5% (BENGAY VANISHING SCENT) 2.5 % topical gel   Topical Q6H PRN Anna Brewer PA-C   Given at 05/23/24 1329    naloxone (NARCAN) injection 0.2 mg  0.2 mg Intravenous Q2 Min PRN Wilner Parker MD        Or    naloxone (NARCAN) injection 0.4 mg  0.4 mg Intravenous Q2 Min PRN Wilner Parker MD        Or    naloxone (NARCAN) injection 0.2 mg  0.2 mg Intramuscular Q2 Min PRN Wilner Parker MD        Or    naloxone (NARCAN) injection 0.4 mg  0.4 mg Intramuscular Q2 Min PRN Wilner Parker MD        OLANZapine (zyPREXA) tablet 2.5 mg  2.5 mg Oral TID PRN Wilner Parker MD        Or    OLANZapine (zyPREXA) injection 2.5 mg  2.5 mg Intramuscular TID PRN Wilner Parker MD        ondansetron (ZOFRAN ODT) ODT tab 4 mg  4 mg Oral Q6H PRN Wilner Parker MD        oxyCODONE (ROXICODONE) tablet 5 mg  5 mg Oral Q6H Tejinder Correa MD   5 mg at 05/24/24 0606    And    oxyCODONE (ROXICODONE) tablet 5 mg  5 mg Oral Daily PRN Tejinder Correa MD   5 mg at 05/23/24 2153    pantoprazole (PROTONIX) EC tablet 40 mg  40 mg Oral Daily Keith  MD Wilner   40 mg at 05/23/24 0915    polyethylene glycol (MIRALAX) Packet 17 g  17 g Oral Daily PRN Wilner Parker MD        potassium chloride jeannette ER (KLOR-CON M20) CR tablet 20 mEq  20 mEq Oral Daily Wilner Parker MD   20 mEq at 05/23/24 0915    rOPINIRole (REQUIP) tablet 2 mg  2 mg Oral TID Nallely Barber MD   2 mg at 05/24/24 0100    sodium chloride (OCEAN) 0.65 % nasal spray 1 spray  1 spray Both Nostrils Q1H PRN Anna Brewer PA-C        SYNTHROID (Brand only - 75 mcg strength tablets)  150 mcg Oral Once per day on Monday Tuesday Wednesday Thursday Friday Saturday Tejinder Correa MD   150 mcg at 05/22/24 0710    And    [START ON 5/26/2024] SYNTHROID tablet 75 mcg (brand only)  75 mcg Oral Every Sunday Tejinder Correa MD        zolpidem (AMBIEN) tablet 5 mg  5 mg Oral At Bedtime PRN Wilner Parker MD   5 mg at 05/24/24 0056       Labs and Imaging:  New results:   No results found for this or any previous visit (from the past 24 hour(s)).      Data this admission:  - CBC elevated Hgb 17.7  - CMP unremarkable  - TSH normal  - UDS THC positive  - Hgb A1c normal  - Lipids LDL mildly elevated 103 but otherwise unremarkable  - Vitamin B12 unremarkable  - Folate unremarkable  - Vitamin D unremarkable  - Urinalysis unremarkable  - EKG normal sinus rhythm, RBBB QTc 458    Potential Drug Interactions:   Per [employer], combinations of the following drugs has a rating of D, demonstrating that the medications may interact with each other in a clinically significant manner and a patient-specific assessment was made and determined that the benefits outweighed the risks.   - Gabapentin, oxycodone, and Magnesium oxide The following risks include CNS depression.   - Gabapentin and levothyroxine. The following risks include magnesium salts decreasing serum concentration of levothyroxine.     Per [employer], combinations of the following drugs has a rating of C demonstrating that agents may interact in a  "clinically significant manner but the benefits of the combination of medications often outweigh the risk.   - Ropinirole, oxycodone, and gabapentin. The following risks include CNS depression.   - allopurinol and ethacrynic acid. The following risks include ethacrynic acid increasing concentration of allopurinol.  - ethacrynic acid and oxycodone.  The following risks include oxycodone may enhanced the toxic effects of ethacrynic acid  Treatment team will monitor  for potential side effects and re-evaluate as needed.           Mental Status Exam:     Oriented to:  Grossly Oriented  General:  Awake and Alert  Appearance:  appears stated age and Grooming is adequate  Behavior/Attitude:  cooperative and open  Eye Contact:  appropriate  Psychomotor: normal and no evidence of tics, dystonia, or tardive dyskinesia no catatonia present  Speech:  appropriate volume/tone and talkative  Language: Fluent in English with appropriate syntax and vocabulary.  Mood:  \"not so good; I am overwhelmed\"  Affect:  labile, tearful, and anxious  Thought Process:  coherent, goal directed, and circumstantial  Thought Content:  suicidal ideation (passive), No HI, No VH, and No AH; No apparent delusions  Associations:  intact  Insight:  fair due to recognizing the circumstances of her decompensation.  Judgment:  fair due to willingness to engage in ECT and better her MH  Impulse control: fair  Attention Span:  grossly intact  Concentration:  grossly intact  Recent and Remote Memory:  not formally assessed  Fund of Knowledge:  average  Muscle Strength and Tone: normal  Gait and Station: Normal and notes difficulty with gait     Psychiatric Assessment     Randi Cleary is a 68 year old female previously diagnosed with MDD, recurrent severe, substance induced mood disorder, Unspecified Trauma, CHAVO, borderline personality disorder and alcohol use disorder who presented voluntarily with SI in the context of treatment resistant MDD. On admission " she presented with significant symptoms of depression incuding emotional lability, low mood, hopelessness, amotivation, anxiety, anhedonia, low energy, insomnia low appetite, excess guilt and poor concentration. Her affect was dysphroic anjd axious at times with heavy perseveration on medico-surgical dignosises and passive SI causing incresed anxiety. She notes she has had a plan but no intent. Her history of extensive substance use, medical history and chronic pain contribute biologically. Her presentation is further complicated by borderline personality disorder and history of trauma. Additionally, she has a very limited support system, interpersonal conflicts with friends and her spouse. However her last hospitalization was in the 1980's for a suicide attempt via prozac overdose and has been engaged in treatment and present voluntarily. Her presentation is consistent with decompensated Major Depressive disorder, recurrent severe. Her disorder has been treatment resistant including to TMS and at this time, ECT would be the best course of action to address her symptom severity. Patient warrants inpatient psychiatric hospitalization to maintain her safety.      Psychiatric Plan by Diagnosis      Today's changes:  - none      # MDD, recurrent, severe, with anxious distress  - Patient suffers from treatment resistant depression and has trialed many medications. At this point treatment team will need to do a chart review to review medication trials and determine the best medication to address depressive symptoms long term  - Pt will start ECT 5/24 : Hold high-dose gabapentin/pregabalin after 6pm on the day before ECT (to permit adequate seizure)     #Insomnia  - Zolpidem 5mg qpm prn    Pertinent Labs/Monitoring:   - EKG 5/22 - Qtc 458 NSR, RBBB     Additional Plans:  - Patient will be treated in therapeutic milieu with appropriate individual and group therapies as described     Psychiatric Hospital Course:      Randi  NICOLE Cleary was admitted to [facility] as a voluntary patient. ECT and Medicine consult was placed in order to get the patient cleared for an acute series of ECT. Plan for a Right Unilateral ultra-brief pulse given the patient's age and concern for memory and cognitive deficits.     The risks, benefits, alternatives, and side effects were discussed and understood by the patient     Medical Assessment and Plan     Medical diagnoses to be addressed this admission:    # Hypothyroidism  - PTA 150mcg M-Sa, 75mcg on Perez     # KYAW   - Doesn't use CPAP at home, sleep study on 06/2024      # RLS  - Continue PTA Ropinirole 2 mg PO TID  - Gabapentin 400mg qpm  - Magnesium Citrated changed to Magnesium Oxide 400mg BID (per patient and medicine)    #RBBB:  Previously on other EKGs    #Cervical radiculopathy and myelopathy s/p cervical laminoplasty 12/2021    # Chronic Pain  - Continue PTA Roxicodone 5 mg q6h + 5 mg PRN (for max daily dose of 25 mg)  - Menthol 2.5% topical gel q6hrs prn  - TENS Unit     #Dizziness  #Headache  Reports hx of migraines, no vision changes or hearing changes. notes this has been ongoing since she had her spinal surgery.   - CT head 5/22 unremarkable    #Gout  - Nutrition consult  - Allopurinol 300mg qpm  - Flares in left podagra but none currently    #Vitamin B12 Deficiency  - Vit B12 500mcg qday    #Vitamin C Deficiency  - Ascorbic Acid 1000mg    #Vitamin D Deficiency  - 250mcg weekly    #COPD  - Breo Ellipta 1 puff daily   - Incruse Ellipta 1 puff daily  - Tessalon cap 200mg TID prn  - Albuterol 2 puffs q6hr prn  - Mucinex 1200mg BID prn     #GERD  #Hiatal Hernia  - Protonix 40mg daily    #Possible pulmonary HTN  #HTN  Per Medicine note, pt follows with Cardiology here and has scheduled right heart catheterization for 06/12/2024. Has follow up with Cardiology in clinic scheduled for 06/19/2024. PTA edecrin 12.5mg daily and potassium supplement. Electrolytes and renal function stable.Recently lost  50lbs and BP has improved.   - Ethacrynic Acid 12.5mg qday   -Hold Edecrin on date of ECT, continue 12.5mg daily for now  -Continue potassium supplement   - Klorcon 20meq daily     #Hemochromatosis, hereditary  - Hgb Stable at ~17    #Hepatic Steatosis  - Stable  - LFTs wnl, no abdominal pain, distention, N/V    #History of breast cancer s/p mastectomy, chemo and XRT:   - currently in remission      #Alcohol use disorder, in remission:   - No current use. Two years sober     #Hx of pheochromocytoma s/p left adrenalectomy 08/2017:   - Stable  - Follows Endocrinology for this.     #Psoriasis:  - Noted on R hang  - Uses scalp brush    Medical course: Patient was physically examined by the ED prior to being transferred to the unit and was found to be medically stable and appropriate for admission. Medicine consult was done to provide clearance for ECT. Due to patient's persistent neck pain after surgery in 2021, CT Head was done which was negative. Oxycodone changed from q6hr prn to scheduled with an extra 5mg in the day as needed per PTA regimen. Patient continued to express some nerve pain so gabapentin was increased to 400mg.      Consults:  none    Medical course: Patient was physically examined by the ED prior to being transferred to the unit and was found to be medically stable and appropriate for admission.     Consults: Medicine for ECT Clearance, ECT Consult, Nutrition     Checklist     Legal Status: Voluntary     Safety Assessment:   Behavioral Orders   Procedures    Code 1 - Restrict to Unit    Code 2 - 1:1 Staff Supervision     For coming down to ECT only    Discontinue 1:1 attendant for suicide risk     Order Specific Question:   I have performed an in person assessment of the patient     Answer:   Based on this assessment the patient no longer requires a one on one attendant at this point in time.     Order Specific Question:   Rationale     Answer:   Patient States able to remain safe in hospital      Order Specific Question:   Rationale     Answer:   Modifications to care environment made to mitigate safety risk     Order Specific Question:   Rationale     Answer:   Routine observations are sufficient to monitor safety.    Electroconvulsive therapy     Series of up to 12 treatments. Begin Date: 5/24/24     Treating Psychiatrist providing ECT:  Ruthann     Notified on:  5/21/24    Electroconvulsive therapy     Series of up to 12 treatments. Begin Date: 5/24/24     Treating Psychiatrist providing ECT:  Ruthann     Notified on:  5/21/24    Fall precautions    Routine Programming     As clinically indicated    Status 15     Every 15 minutes.    Suicide precautions: Suicide Risk: MODERATE; Clinical rationale to override score: lack of access to a plan for self-harm     Order Specific Question:   Suicide Risk     Answer:   MODERATE     Order Specific Question:   Clinical rationale to override score:     Answer:   lack of access to a plan for self-harm       Risk Assessment:  Risk for harm is moderate-high.  Risk factors: SI, maladaptive coping, trauma, and past behaviors  Protective factors: engaged in treatment     SIO: Discontinued    Disposition: Pending stabilization, medication optimization, & development of a safe discharge plan.     Attestations   Patient seen and discussed with attending physician, Dr. Jairo Choudhary who is in agreement with my assessment and plan.    Alvina Grag D.O.  Psychiatry Resident  [facility]     Attestation:  This patient has been seen and evaluated by me, Jairo Choudhary MD.  I have discussed this patient with the house staff team including the resident and medical student and I agree with the findings and plan in this note.    I have reviewed today's vital signs, medications, labs and imaging. Jairo Choudhary MD , PhD.

## 2024-05-24 NOTE — PLAN OF CARE
Assessment/Intervention/Current Symtoms and Care Coordination:  Chart reviewed and patient met with team,   Discussed patient progress, symptomology, and response to treatment.  Discussed the discharge plan and any potential impediments to discharge.     Patient continues to report depressive sx's- appearing very anxious with many complaints- requires much reassurance.  Patient did meet with Dietitian yesterday.  She has attended some groups and met with with unit therapist.    Patient scheduled to begin ECT today - 1:00pm     Discharge Plan or Goal:  Patient will return home when stable  Psychiatry  55 Plus?        Barriers to Discharge:  Severity of depression/inability to function  Initiation of ECT     Referral Status:  None today     Legal Status:  Voluntary     Contacts:  Outpatient Psychiatrist: Jeannette [facility]    Primary Physician: Laith Constantino  Family Members: Justin Izquierdon (Spouse) [phone]        Upcoming Meetings and Dates/Important Information and next steps:  Team update:  Wed

## 2024-05-24 NOTE — PROCEDURES
"Procedure/Surgery Information   [facility]  Procedures  [facility], [place]   ECT Procedure Note   05/24/2024    Randi Cleary is a 68 year old  year old female patient.  [id number]    Patient Status: Inpatient    Is this the first in a series of 12 treatments?  Yes     Allergies   Allergen Reactions    Serotonin Reuptake Inhibitors (Ssris) Anxiety, Difficulty breathing, Headache, Palpitations and Shortness Of Breath    Buspirone      The patient states she had serotonin syndrome    Cephalexin      Other reaction(s): unknown rxn.    Desvenlafaxine      Serotonin syndrome    Diclofenac Sodium [Diclofenac]      Serotonin syndrome and restless legs syndrome    Gabapentin      Drove on the wrong side of the highway    Levofloxacin      \"CAN'T REMEMBER\"    Penicillins      \"SORES IN MOUTH\"    Riluzole Difficulty breathing and Swelling    Sulfa Antibiotics      \"PT DOES NOT KNOW WHAT THE REACTION WAS\"    Topiramate Other (See Comments)     Frequent urination       Weight:  195 lbs 4.8 oz / 88 kg          Indications for ECT:   Medications ineffective and Psychotherapies ineffective         Clinical Narrative:   HPI - The patient describes a lifelong history of depression dating back to elementary school, worsening after her father's death when she was 13yo and especially in the 1980s in the setting of worsening physical health (since falling and breaking her ankle), which led to the the initiation of fluoxetine, her first antidepressant.  Her history has been primarily characterized by low mood, with some ups and downs but no extended depression-free periods since then.  She has tried many different medications from different classes, but cannot recall any one being particularly helpful for her.  She has experienced past episodes of particularly irritable mood and concomitant decreased sleep need, although most of these have lasted 1-2 days " "and triggered by anger related to external stressors.  She has at least one episode of a more extended episode of elevated mood (and associated grandiosity, increased spending, flight of ideas, increased goal directed behaviors, pressured speech, and odd and embarrassing behavior), which occurred in the context of relapse on alcohol in 2021. She does not endorse any events before about age 48.     The patient's depression is characterized by near daily low mood, frequent anhedonia, with sleep difficulties (although c/b pain, KYAW, and RLS), fatigue, feelings of guilt/worthlessness, and impaired concentration.  She reports feelings of \"despair,\" at times with active SI with plan, but denies any intent to act on this - \"maybe it's hope.\"  She has 1 lifetime suicide attempt (overdose) in the 1980s, for which she was psychiatrically hospitalized.  She has a remote history of SIB (cutting) but not recently.       Psych pertinent item history includes includes suicide attempt , suicidal ideation, SIB , aggression, trauma hx, substance use: alcohol, cannabis, and Patient has a history of alcohol dependence treated 20+ years ago and she relapsed in 2020 for a couple of months, in her 20s she\" loved getting high\" on cocaine, LSD, mushrooms, speed, white cross, mutiple psychotropic trials , psych hosp, ketamine, and major medical problems.    Target Symptoms for Improvement: Amotivation, Fatigue, Improved self-image and Panic attacks         Diagnosis:   Major depression         Assessment:   #1 05/24/24 Some circumstantial thought, needs some redirection, 3.5 hours sleep last night, anxious, mood 1/10, PDW but no SI, never had ECT before - only TMS. No AVH.          Pause for the Cause:     Correct patient Yes   Correct procedure/laterality settings: Yes           Intra-Procedure Documentation:     ECT #: 1   Treatment number this series: 1   Total treatment number: 1     Type of ECT:  Right, unilateral ultrabrief    ECT " Medications:    Will probably need Toradol 30mg iv - for headache/myalgia     Brevital: 90 mg + 10 mg --> NEXT TIME 100 mg   Succinyl Choline: 90 mg    BP - nicardipine 500 mcg     ECT Strip Summary:   RUL Titration #1: 9.6 mC, 0.3 ms, 20 Hz, 1 sec, 800 mA   RUL Titration #2: 19.2 mC, 0.3 ms, 20 Hz, 2 sec, 800 mA   RUL Titration #3: 38.4 mC, 0.3 ms, 20 Hz, 4 sec, 800 mA     NEXT treatment, Energy Level:  230.4 mC, 0.3 ms, 60 Hz, 8 sec, 800 mA     Motor Seizure Duration: 25 seconds  EEG Seizure Duration: 35 seconds    Complications: no    Plan:   - Continue RUL ECT - Q MW    - Monitor depression severity with clinical assessment augmented with PHQ9 every other treatment  - Continue current medications    Discharge instructions:   - Continue acute ECT    - Per Dr Varela:   - Consider trial of serotonin modulator (e.g., vilazodone vs. vortioxetine) or novel agent Auvelity  - Consider augmentation with atypical antipsychotic (e.g., quetiapine or aripiprazole), though there are concerns given pre-diabetes      Dominique Beavers MD  Dept of Psychiatry

## 2024-05-25 PROCEDURE — 250N000013 HC RX MED GY IP 250 OP 250 PS 637: Performed by: PHYSICIAN ASSISTANT

## 2024-05-25 PROCEDURE — 250N000013 HC RX MED GY IP 250 OP 250 PS 637

## 2024-05-25 PROCEDURE — 124N000002 HC R&B MH UMMC

## 2024-05-25 RX ORDER — LIDOCAINE 4 G/G
1 PATCH TOPICAL
Status: DISCONTINUED | OUTPATIENT
Start: 2024-05-26 | End: 2024-06-22 | Stop reason: HOSPADM

## 2024-05-25 RX ADMIN — PANTOPRAZOLE SODIUM 40 MG: 40 TABLET, DELAYED RELEASE ORAL at 08:02

## 2024-05-25 RX ADMIN — Medication 1000 MG: at 08:03

## 2024-05-25 RX ADMIN — Medication 12.5 MG: at 08:03

## 2024-05-25 RX ADMIN — ACETAMINOPHEN 650 MG: 325 TABLET, FILM COATED ORAL at 14:57

## 2024-05-25 RX ADMIN — OXYCODONE HYDROCHLORIDE 5 MG: 5 TABLET ORAL at 23:58

## 2024-05-25 RX ADMIN — ROPINIROLE HYDROCHLORIDE 2 MG: 2 TABLET, FILM COATED ORAL at 23:15

## 2024-05-25 RX ADMIN — OXYCODONE HYDROCHLORIDE 5 MG: 5 TABLET ORAL at 12:42

## 2024-05-25 RX ADMIN — FLUTICASONE FUROATE AND VILANTEROL TRIFENATATE 1 PUFF: 100; 25 POWDER RESPIRATORY (INHALATION) at 08:03

## 2024-05-25 RX ADMIN — ACETAMINOPHEN 650 MG: 325 TABLET, FILM COATED ORAL at 08:02

## 2024-05-25 RX ADMIN — UMECLIDINIUM 1 PUFF: 62.5 AEROSOL, POWDER ORAL at 08:03

## 2024-05-25 RX ADMIN — ROPINIROLE HYDROCHLORIDE 2 MG: 2 TABLET, FILM COATED ORAL at 14:57

## 2024-05-25 RX ADMIN — LEVOTHYROXINE SODIUM 150 MCG: 75 TABLET ORAL at 06:36

## 2024-05-25 RX ADMIN — Medication 3 MG: at 23:59

## 2024-05-25 RX ADMIN — OXYCODONE HYDROCHLORIDE 5 MG: 5 TABLET ORAL at 06:31

## 2024-05-25 RX ADMIN — ALLOPURINOL 300 MG: 300 TABLET ORAL at 20:19

## 2024-05-25 RX ADMIN — POTASSIUM CHLORIDE 20 MEQ: 1500 TABLET, EXTENDED RELEASE ORAL at 08:03

## 2024-05-25 RX ADMIN — MAGNESIUM OXIDE TAB 400 MG (241.3 MG ELEMENTAL MG) 400 MG: 400 (241.3 MG) TAB at 20:19

## 2024-05-25 RX ADMIN — GABAPENTIN 400 MG: 400 CAPSULE ORAL at 22:11

## 2024-05-25 RX ADMIN — Medication 500 MCG: at 08:02

## 2024-05-25 RX ADMIN — ALBUTEROL SULFATE 2 PUFF: 90 AEROSOL, METERED RESPIRATORY (INHALATION) at 00:50

## 2024-05-25 RX ADMIN — Medication 3 MG: at 00:50

## 2024-05-25 RX ADMIN — ROPINIROLE HYDROCHLORIDE 2 MG: 2 TABLET, FILM COATED ORAL at 20:19

## 2024-05-25 RX ADMIN — OXYCODONE HYDROCHLORIDE 5 MG: 5 TABLET ORAL at 18:13

## 2024-05-25 RX ADMIN — MAGNESIUM OXIDE TAB 400 MG (241.3 MG ELEMENTAL MG) 400 MG: 400 (241.3 MG) TAB at 08:03

## 2024-05-25 ASSESSMENT — ACTIVITIES OF DAILY LIVING (ADL)
LAUNDRY: WITH SUPERVISION
ADLS_ACUITY_SCORE: 43
DRESS: STREET CLOTHES
ORAL_HYGIENE: INDEPENDENT
ADLS_ACUITY_SCORE: 43
ORAL_HYGIENE: INDEPENDENT
ADLS_ACUITY_SCORE: 43
HYGIENE/GROOMING: INDEPENDENT
ADLS_ACUITY_SCORE: 43
ADLS_ACUITY_SCORE: 43
HYGIENE/GROOMING: INDEPENDENT
ADLS_ACUITY_SCORE: 43
LAUNDRY: WITH SUPERVISION
DRESS: INDEPENDENT

## 2024-05-25 NOTE — PLAN OF CARE
Problem: Adult Behavioral Health Plan of Care  Goal: Adheres to Safety Considerations for Self and Others  Outcome: Progressing  Intervention: Develop and Maintain Individualized Safety Plan  Recent Flowsheet Documentation  Taken 5/25/2024 1716 by Alissa Arnold RN  Safety Measures:   safety rounds completed   environmental rounds completed     Problem: Suicide Risk  Goal: Absence of Self-Harm  Outcome: Progressing  Intervention: Assess Risk to Self and Maintain Safety  Recent Flowsheet Documentation  Taken 5/25/2024 1824 by Alissa Arnold RN  Behavior Management: other (see comments)  Self-Harm Prevention: other (see comments)  Taken 5/25/2024 1823 by Alissa Arnold RN  Behavior Management: other (see comments)  Self-Harm Prevention: other (see comments)  Taken 5/25/2024 1716 by Alissa Arnold RN  Self-Harm Prevention:   environmental self-harm risks assessed   observed one-to-one  Intervention: Establish Safety Plan and Continuity of Care  Recent Flowsheet Documentation  Taken 5/25/2024 1716 by Alissa Arnold RN  Safe Transition Promotion:   personal safety plan developed   access to lethal means addressed     Problem: Fall Injury Risk  Goal: Absence of Fall and Fall-Related Injury  Outcome: Progressing   Goal Outcome Evaluation:    Pt  was isolative and withdrawn to room at the beginning of the shift. She came out for meals and was visible in the milieu  watching TV and interacting with select peers.  Presented with a bright affect upon assessment.Mood is anxious. Pt continous to  have multiple physical concerns. Complain of neck, head right/left hand  Rated neck pain at 8. Oxycodone administered and hot pack applied to her both hands. Pt endorse anxiety 6/10, depression 5/10. Denies SI/HI/SIB/ AVH. Nutrition and hydration adequate. No safety concerns. Pt contracted for safety. No behavioral concerns.

## 2024-05-25 NOTE — PLAN OF CARE
Pt has been more visible in the milieu, tends to be socially withdrawn for the most part. She was initially tearful, then presented with a brighter affect throughout the day. Pt continues to have multiple physical complaints, writer spent extensive amount of time discussing these with her. On call resident paged for request of voltaren gel, order was placed for lidocaine patch d/t pt hx of serotonin syndrome. Order also placed for pt to be able to use her own compression stockings. Pt is med compliant, received PRN tylenol x2 for c/o headache.    Problem: Depression  Goal: Improved Mood  Outcome: Progressing   Goal Outcome Evaluation:    Plan of Care Reviewed With: patient

## 2024-05-25 NOTE — PLAN OF CARE
Problem: Sleep Disturbance  Goal: Adequate Sleep/Rest  Outcome: Progressing   Goal Outcome Evaluation:  Patient had a calm night. Awake briefly at the beginning of the shift, requested and received prn albuterol inhaler citing due to anesthesia at ECT. Patient also requested Melatonin for sleep. Appears to have slept for 5.5 hours. Safety checks in place.

## 2024-05-25 NOTE — PLAN OF CARE
Problem: Adult Behavioral Health Plan of Care  Goal: Adheres to Safety Considerations for Self and Others  Outcome: Progressing     Problem: Suicide Risk  Goal: Absence of Self-Harm  Outcome: Progressing  Intervention: Assess Risk to Self and Maintain Safety  Recent Flowsheet Documentation  Taken 5/24/2024 1834 by Alissa Arnold RN  Behavior Management: other (see comments)  Self-Harm Prevention:   environmental self-harm risks assessed   observed one-to-one  Intervention: Establish Safety Plan and Continuity of Care  Recent Flowsheet Documentation  Taken 5/24/2024 1834 by Alissa Arnold RN  Safe Transition Promotion:   personal safety plan developed   access to lethal means addressed     Problem: Fall Injury Risk  Goal: Absence of Fall and Fall-Related Injury  Outcome: Progressing   Goal Outcome Evaluation:    Pt  was isolated and withdrawn to her room most of the  shift.She came out and requested oxycodone, when writer explained to pt that it was too soon to have oxycodone  since had just it at 1400pm she became very upset and had a yelling episode. Writer offered warm and cold compress and Tylenol pt declined. Stated, she was getting  oxycodone four times a day and prn. The resident was notified and she gave new orders. See MAR.  Pt reported that intervention was not effective and requested tylenol and  a hot pack. Affect labile, tearful, anxious. Mood anxious and depressed. Endorse pain and rated it at 9/10. Depression 7/10, anxiety 7/10. Denies SI/HI/SIB/AVH. No apparent delusion. Nutrition and oral intake adequate.  Grooming is adequate. No acute safety concerns. She is on a medical bed to help relief her pain. Pt reported hot pack was not effective. Requested  and received Tylenol 650 mg at 2922 pm. Pt is not happy with the medical bed because she cannot control it.  Pt  visited and it went well. Pt came out at 2312 pm complaining of  pain rated  it  at 10/10  PRN oxycodone administered.

## 2024-05-26 ENCOUNTER — APPOINTMENT (OUTPATIENT)
Dept: ULTRASOUND IMAGING | Facility: CLINIC | Age: 69
End: 2024-05-26
Payer: MEDICARE

## 2024-05-26 LAB — URATE SERPL-MCNC: 2.8 MG/DL (ref 2.4–5.7)

## 2024-05-26 PROCEDURE — 250N000013 HC RX MED GY IP 250 OP 250 PS 637

## 2024-05-26 PROCEDURE — 84550 ASSAY OF BLOOD/URIC ACID: CPT

## 2024-05-26 PROCEDURE — 93970 EXTREMITY STUDY: CPT | Mod: 26 | Performed by: RADIOLOGY

## 2024-05-26 PROCEDURE — 93970 EXTREMITY STUDY: CPT

## 2024-05-26 PROCEDURE — 36415 COLL VENOUS BLD VENIPUNCTURE: CPT

## 2024-05-26 PROCEDURE — 250N000013 HC RX MED GY IP 250 OP 250 PS 637: Performed by: PHYSICIAN ASSISTANT

## 2024-05-26 PROCEDURE — 124N000002 HC R&B MH UMMC

## 2024-05-26 RX ADMIN — GABAPENTIN 400 MG: 400 CAPSULE ORAL at 21:57

## 2024-05-26 RX ADMIN — POTASSIUM CHLORIDE 20 MEQ: 1500 TABLET, EXTENDED RELEASE ORAL at 09:23

## 2024-05-26 RX ADMIN — ACETAMINOPHEN 650 MG: 325 TABLET, FILM COATED ORAL at 14:01

## 2024-05-26 RX ADMIN — OXYCODONE HYDROCHLORIDE 5 MG: 5 TABLET ORAL at 06:20

## 2024-05-26 RX ADMIN — Medication 1000 MG: at 09:23

## 2024-05-26 RX ADMIN — PANTOPRAZOLE SODIUM 40 MG: 40 TABLET, DELAYED RELEASE ORAL at 09:23

## 2024-05-26 RX ADMIN — OXYCODONE HYDROCHLORIDE 5 MG: 5 TABLET ORAL at 18:03

## 2024-05-26 RX ADMIN — ROPINIROLE HYDROCHLORIDE 2 MG: 2 TABLET, FILM COATED ORAL at 20:47

## 2024-05-26 RX ADMIN — Medication 500 MCG: at 09:23

## 2024-05-26 RX ADMIN — ROPINIROLE HYDROCHLORIDE 2 MG: 2 TABLET, FILM COATED ORAL at 22:33

## 2024-05-26 RX ADMIN — Medication 12.5 MG: at 09:23

## 2024-05-26 RX ADMIN — LIDOCAINE 1 PATCH: 4 PATCH TOPICAL at 09:24

## 2024-05-26 RX ADMIN — FLUTICASONE FUROATE AND VILANTEROL TRIFENATATE 1 PUFF: 100; 25 POWDER RESPIRATORY (INHALATION) at 09:23

## 2024-05-26 RX ADMIN — UMECLIDINIUM 1 PUFF: 62.5 AEROSOL, POWDER ORAL at 09:23

## 2024-05-26 RX ADMIN — MAGNESIUM OXIDE TAB 400 MG (241.3 MG ELEMENTAL MG) 400 MG: 400 (241.3 MG) TAB at 20:47

## 2024-05-26 RX ADMIN — ACETAMINOPHEN 650 MG: 325 TABLET, FILM COATED ORAL at 09:25

## 2024-05-26 RX ADMIN — MAGNESIUM OXIDE TAB 400 MG (241.3 MG ELEMENTAL MG) 400 MG: 400 (241.3 MG) TAB at 09:23

## 2024-05-26 RX ADMIN — ROPINIROLE HYDROCHLORIDE 2 MG: 2 TABLET, FILM COATED ORAL at 14:01

## 2024-05-26 RX ADMIN — OXYCODONE HYDROCHLORIDE 5 MG: 5 TABLET ORAL at 02:55

## 2024-05-26 RX ADMIN — ALLOPURINOL 300 MG: 300 TABLET ORAL at 20:46

## 2024-05-26 RX ADMIN — OXYCODONE HYDROCHLORIDE 5 MG: 5 TABLET ORAL at 12:01

## 2024-05-26 RX ADMIN — LEVOTHYROXINE SODIUM 75 MCG: 75 TABLET ORAL at 06:20

## 2024-05-26 ASSESSMENT — ACTIVITIES OF DAILY LIVING (ADL)
ADLS_ACUITY_SCORE: 43
LAUNDRY: WITH SUPERVISION
ADLS_ACUITY_SCORE: 43
ADLS_ACUITY_SCORE: 43
LAUNDRY: WITH SUPERVISION
ORAL_HYGIENE: INDEPENDENT
ADLS_ACUITY_SCORE: 43
ORAL_HYGIENE: INDEPENDENT
ADLS_ACUITY_SCORE: 43
HYGIENE/GROOMING: INDEPENDENT
ADLS_ACUITY_SCORE: 43
DRESS: INDEPENDENT
ADLS_ACUITY_SCORE: 43
DRESS: STREET CLOTHES;INDEPENDENT
ADLS_ACUITY_SCORE: 43
ADLS_ACUITY_SCORE: 43
HYGIENE/GROOMING: INDEPENDENT
ADLS_ACUITY_SCORE: 43

## 2024-05-26 NOTE — PLAN OF CARE
Problem: Sleep Disturbance  Goal: Adequate Sleep/Rest  Outcome: Progressing   Goal Outcome Evaluation:  Patient was awake intermittently for brief periods. Requested and received prn Oxycodone for back and neck pain. Patient reported unrelieved pain from the previously administered scheduled Oxycodone. Asleep during follow up checks. Endorsed no other acute concerns. Safety checks in place. Respirations regular and non labored. Appears to have slept for 5 hours.

## 2024-05-26 NOTE — PLAN OF CARE
Pt has been more visible in the milieu today and more social with peers. She has presented with a bright affect today and has not been as tearful in comparison to previous days. Pt does continue to have multiple physical concerns; endorses pain in neck, back, and hands. Pt assisted with use of TENS unit today and lidocaine patch applied. Pt also given PRN tylenol x2, rated pain 9/10. Intake and hygiene are adequate, no other concerns at this time.    Problem: Suicidal Behavior  Goal: Suicidal Behavior is Absent or Managed  Outcome: Progressing   Goal Outcome Evaluation:    Plan of Care Reviewed With: patient

## 2024-05-26 NOTE — PLAN OF CARE
Problem: Suicide Risk  Goal: Absence of Self-Harm  Outcome: Progressing  Intervention: Assess Risk to Self and Maintain Safety  Recent Flowsheet Documentation  Taken 5/26/2024 1735 by Alissa Arnold RN  Self-Harm Prevention: other (see comments)  Intervention: Establish Safety Plan and Continuity of Care  Recent Flowsheet Documentation  Taken 5/26/2024 1735 by Alissa Arnold RN  Safe Transition Promotion:   personal safety plan developed   access to lethal means addressed     Problem: Fall Injury Risk  Goal: Absence of Fall and Fall-Related Injury  Outcome: Progressing   Goal Outcome Evaluation:    Pt was isolative and withdrawn to room napping in the beginning of the shift. She came out for medication and dinner. Affect is bright .She was pleasant and cooperative with mental health and psych  assessments. She endorsed pain, rated it at 8/10. Stated,  she used  the tens unit  all day and it was effective.Mood is calm. Speech is clear. Endorse anxiety 5/10, depression 6/10. She denied SI/SIB/HI/ AVH. Nutrition and hydration adequate. Bilateral LE US done this evening. See results in pt chart . No behavioral concerns. Pt  and her friend visited and it went well.

## 2024-05-27 PROCEDURE — 124N000002 HC R&B MH UMMC

## 2024-05-27 PROCEDURE — 250N000013 HC RX MED GY IP 250 OP 250 PS 637

## 2024-05-27 PROCEDURE — 250N000013 HC RX MED GY IP 250 OP 250 PS 637: Performed by: PHYSICIAN ASSISTANT

## 2024-05-27 RX ADMIN — MAGNESIUM OXIDE TAB 400 MG (241.3 MG ELEMENTAL MG) 400 MG: 400 (241.3 MG) TAB at 08:03

## 2024-05-27 RX ADMIN — MAGNESIUM OXIDE TAB 400 MG (241.3 MG ELEMENTAL MG) 400 MG: 400 (241.3 MG) TAB at 19:24

## 2024-05-27 RX ADMIN — Medication 500 MCG: at 08:03

## 2024-05-27 RX ADMIN — POTASSIUM CHLORIDE 20 MEQ: 1500 TABLET, EXTENDED RELEASE ORAL at 08:03

## 2024-05-27 RX ADMIN — FLUTICASONE FUROATE AND VILANTEROL TRIFENATATE 1 PUFF: 100; 25 POWDER RESPIRATORY (INHALATION) at 08:03

## 2024-05-27 RX ADMIN — GABAPENTIN 100 MG: 100 CAPSULE ORAL at 09:00

## 2024-05-27 RX ADMIN — ROPINIROLE HYDROCHLORIDE 2 MG: 2 TABLET, FILM COATED ORAL at 19:23

## 2024-05-27 RX ADMIN — ROPINIROLE HYDROCHLORIDE 2 MG: 2 TABLET, FILM COATED ORAL at 15:23

## 2024-05-27 RX ADMIN — LEVOTHYROXINE SODIUM 150 MCG: 75 TABLET ORAL at 06:19

## 2024-05-27 RX ADMIN — OXYCODONE HYDROCHLORIDE 5 MG: 5 TABLET ORAL at 12:56

## 2024-05-27 RX ADMIN — OXYCODONE HYDROCHLORIDE 5 MG: 5 TABLET ORAL at 06:19

## 2024-05-27 RX ADMIN — Medication 12.5 MG: at 08:03

## 2024-05-27 RX ADMIN — UMECLIDINIUM 1 PUFF: 62.5 AEROSOL, POWDER ORAL at 08:03

## 2024-05-27 RX ADMIN — OXYCODONE HYDROCHLORIDE 5 MG: 5 TABLET ORAL at 19:23

## 2024-05-27 RX ADMIN — ACETAMINOPHEN 650 MG: 325 TABLET, FILM COATED ORAL at 08:03

## 2024-05-27 RX ADMIN — OXYCODONE HYDROCHLORIDE 5 MG: 5 TABLET ORAL at 00:06

## 2024-05-27 RX ADMIN — PANTOPRAZOLE SODIUM 40 MG: 40 TABLET, DELAYED RELEASE ORAL at 08:03

## 2024-05-27 RX ADMIN — GABAPENTIN 400 MG: 400 CAPSULE ORAL at 22:15

## 2024-05-27 RX ADMIN — ALLOPURINOL 300 MG: 300 TABLET ORAL at 19:23

## 2024-05-27 RX ADMIN — Medication 1000 MG: at 08:03

## 2024-05-27 RX ADMIN — Medication: at 20:18

## 2024-05-27 RX ADMIN — ROPINIROLE HYDROCHLORIDE 2 MG: 2 TABLET, FILM COATED ORAL at 23:01

## 2024-05-27 ASSESSMENT — ACTIVITIES OF DAILY LIVING (ADL)
ADLS_ACUITY_SCORE: 43
ADLS_ACUITY_SCORE: 43
ADLS_ACUITY_SCORE: 41
ADLS_ACUITY_SCORE: 43
ADLS_ACUITY_SCORE: 43
ORAL_HYGIENE: INDEPENDENT
HYGIENE/GROOMING: INDEPENDENT
ADLS_ACUITY_SCORE: 43
ADLS_ACUITY_SCORE: 43
LAUNDRY: WITH SUPERVISION
ADLS_ACUITY_SCORE: 43
ORAL_HYGIENE: INDEPENDENT
ADLS_ACUITY_SCORE: 43
DRESS: INDEPENDENT
LAUNDRY: WITH SUPERVISION
ADLS_ACUITY_SCORE: 41
ADLS_ACUITY_SCORE: 43
HYGIENE/GROOMING: INDEPENDENT
ADLS_ACUITY_SCORE: 43
ADLS_ACUITY_SCORE: 41
ADLS_ACUITY_SCORE: 41
DRESS: INDEPENDENT
ADLS_ACUITY_SCORE: 43

## 2024-05-27 NOTE — PROGRESS NOTES
"Brief Cross Cover Note    S: Writer received a call from nursing that patient had some questions regarding allopurinol. She believed that her gout was being undertreated and wanted a higher dose of allopurinol. Writer spoke with patient who pointed at the swelling in her hands and feet and said she is hoping that with a higher dose of allopurinol, they could go down. She also reports wanting access to wrist and arm straps for her carpal tunnel syndrome. She then requested ankle orthotics because her foot feels unstable with her current shoes. She also wanted to know if she could get a higher dose of her diuretic because of concerns for swelling. She also requested access to her nail clippers.     Writer curbsided medicine and they explained they discussed with patient there was no concern for acute flares and the swellings were more consistent with psoriatic arthritis. They did recommend getting a uric acid level to assess if there were concerns for it being 2/2 gout. They also stated that since patient's legs are only minimally swollen and L>R, getting an U/S to rule out DVT is there recommendation and if negative to monitor.     O: /78 (BP Location: Right arm, Patient Position: Sitting, Cuff Size: Adult Regular)   Pulse 87   Temp 98.4  F (36.9  C)   Resp 14   Ht 1.676 m (5' 6\")   Wt 88.4 kg (194 lb 14.4 oz)   LMP  (LMP Unknown)   SpO2 94%   BMI 31.46 kg/m       MSK: Nodules seen on feet and hands.   Lower extremity: Left leg slightly bigger than right. No pitting edema.     A/P:  Randi RIVERA Cathy Evin is a 68 year old female previously diagnosed with MDD, recurrent severe, substance induced mood disorder, Unspecified Trauma, CHAVO, borderline personality disorder and alcohol use disorder who presented voluntarily with SI in the context of treatment resistant MDD.      #Pulmonary HTN  #Leg Swelling  - U/S LE negative  - Wear compression stockings  - Continue Ethacrynic Acid    #Gout  - Uric Acid level " normal 2.8  - Continue allopurinol 300mg    #Orthotics  - Patient care order to wear wrist braces.  - Orthotics consult for ankle    Tejinder Correa  PGY-2 Psychiatry Resident  [facility]

## 2024-05-27 NOTE — PLAN OF CARE
"Pt has been visible in the milieu for the entirety of the shift and has been social with peers. She has presented with a bright affect for most of the day, was briefly tearful and stated her \"mood dropped and she didn't know why.\" Pt endorses low levels of anxiety and depression, denies other mental health symptoms. Pt is med compliant, received PRN tylenol for c/o back/neck/hand pain. Her  visited today and it went well, he dropped off more pictures of her cat and magazines.    Problem: Depression  Goal: Improved Mood  Outcome: Progressing   Goal Outcome Evaluation:    Plan of Care Reviewed With: patient                   "

## 2024-05-27 NOTE — PLAN OF CARE
Problem: Sleep Disturbance  Goal: Adequate Sleep/Rest  Outcome: Progressing   Goal Outcome Evaluation:  Patient had an uneventful night. Appears to have slept for 5.75 hours. Pain adequately managed with scheduled Oxycodone. Safety checks in place. Patient remained in room for majority of the night.  No requests for prn's.

## 2024-05-27 NOTE — PLAN OF CARE
Rehab Group    Start time: 10:15  End time: 11:10  Patient time total: 55 minutes    attended full group    #8 attended   Group Type: occupational therapy   Group Topic Covered: coping skills, emotional regulation, social skills, and healthy leisure time     Group Session Detail:  Patient engaged in group leisure activity (Family Feud) with the focus being: building interpersonal skills, encouraging team collaboration, and promoting overall prosocial engagement and interaction.      Patient Response/Contribution:  Cooperative with task, Confronted peers appropriately , Offered helpful suggestions to peers, Attentive, and Actively engaged     Patient Detail:  Patient remained an attentive and engaged participant throughout full duration of group. Patient exhibited some initial confusion with verbal directions; however, was able to ask appropriate clarifying questions as needed as game progressed. Patient demonstrated a competitive side throughout group; appearing suspicious of accuracy of opposing team's calculations and answers; needed reassurance. Patient worked well in a team dynamic and collaboratively brainstormed answers with peers. No safety or behavioral concerns identified in group.      Patient Active Problem List   Diagnosis    DJD (degenerative joint disease), ankle and foot    Hereditary hemochromatosis (H24)    Pulmonary hypertension (H)    Postablative hypothyroidism    Hx of corticosteroid therapy    Class 2 obesity due to excess calories without serious comorbidity in adult    Prediabetes    KYAW (obstructive sleep apnea)    Type 2 diabetes mellitus without complication, without long-term current use of insulin (H)    Hypokalemia    COPD (chronic obstructive pulmonary disease) (H)    Generalized anxiety disorder    Restless leg syndrome    Vitamin B12 deficiency    Vitamin D deficiency    Morbid obesity (H)    Cord compression (H)    History of cervical spinal surgery    Cervical stenosis of spinal  canal    Alcohol use disorder, moderate, in sustained remission (H)    Essential hypertension    Psoriatic arthropathy (H)    Primary osteoarthritis involving multiple joints    Gastroesophageal reflux disease with esophagitis without hemorrhage    Numbness in both hands    Chronic, continuous use of opioids    Trauma and stressor-related disorder    Post-menopausal    Suicidal ideation    Depression with anxiety    Severe recurrent major depression without psychotic features (H)

## 2024-05-28 PROCEDURE — 250N000013 HC RX MED GY IP 250 OP 250 PS 637

## 2024-05-28 PROCEDURE — 99232 SBSQ HOSP IP/OBS MODERATE 35: CPT | Mod: GC | Performed by: PSYCHIATRY & NEUROLOGY

## 2024-05-28 PROCEDURE — 250N000013 HC RX MED GY IP 250 OP 250 PS 637: Performed by: PHYSICIAN ASSISTANT

## 2024-05-28 PROCEDURE — L1902 AFO ANKLE GAUNTLET PRE OTS: HCPCS

## 2024-05-28 PROCEDURE — 124N000002 HC R&B MH UMMC

## 2024-05-28 RX ORDER — IBUPROFEN 400 MG/1
400 TABLET, FILM COATED ORAL EVERY 6 HOURS PRN
Status: DISCONTINUED | OUTPATIENT
Start: 2024-05-28 | End: 2024-06-22 | Stop reason: HOSPADM

## 2024-05-28 RX ADMIN — OXYCODONE HYDROCHLORIDE 5 MG: 5 TABLET ORAL at 18:42

## 2024-05-28 RX ADMIN — ROPINIROLE HYDROCHLORIDE 2 MG: 2 TABLET, FILM COATED ORAL at 22:42

## 2024-05-28 RX ADMIN — ROPINIROLE HYDROCHLORIDE 2 MG: 2 TABLET, FILM COATED ORAL at 20:21

## 2024-05-28 RX ADMIN — Medication 12.5 MG: at 08:46

## 2024-05-28 RX ADMIN — POTASSIUM CHLORIDE 20 MEQ: 1500 TABLET, EXTENDED RELEASE ORAL at 08:45

## 2024-05-28 RX ADMIN — Medication 1000 MG: at 08:46

## 2024-05-28 RX ADMIN — LEVOTHYROXINE SODIUM 150 MCG: 75 TABLET ORAL at 06:21

## 2024-05-28 RX ADMIN — FLUTICASONE FUROATE AND VILANTEROL TRIFENATATE 1 PUFF: 100; 25 POWDER RESPIRATORY (INHALATION) at 08:46

## 2024-05-28 RX ADMIN — Medication 500 MCG: at 08:45

## 2024-05-28 RX ADMIN — ACETAMINOPHEN 650 MG: 325 TABLET, FILM COATED ORAL at 11:07

## 2024-05-28 RX ADMIN — PANTOPRAZOLE SODIUM 40 MG: 40 TABLET, DELAYED RELEASE ORAL at 08:46

## 2024-05-28 RX ADMIN — Medication 3 MG: at 22:42

## 2024-05-28 RX ADMIN — UMECLIDINIUM 1 PUFF: 62.5 AEROSOL, POWDER ORAL at 08:46

## 2024-05-28 RX ADMIN — OXYCODONE HYDROCHLORIDE 5 MG: 5 TABLET ORAL at 06:22

## 2024-05-28 RX ADMIN — ALLOPURINOL 300 MG: 300 TABLET ORAL at 20:21

## 2024-05-28 RX ADMIN — OXYCODONE HYDROCHLORIDE 5 MG: 5 TABLET ORAL at 11:59

## 2024-05-28 RX ADMIN — Medication 3 MG: at 00:17

## 2024-05-28 RX ADMIN — MAGNESIUM OXIDE TAB 400 MG (241.3 MG ELEMENTAL MG) 400 MG: 400 (241.3 MG) TAB at 08:46

## 2024-05-28 RX ADMIN — ROPINIROLE HYDROCHLORIDE 2 MG: 2 TABLET, FILM COATED ORAL at 14:53

## 2024-05-28 RX ADMIN — OXYCODONE HYDROCHLORIDE 5 MG: 5 TABLET ORAL at 00:17

## 2024-05-28 RX ADMIN — MAGNESIUM OXIDE TAB 400 MG (241.3 MG ELEMENTAL MG) 400 MG: 400 (241.3 MG) TAB at 20:22

## 2024-05-28 ASSESSMENT — ACTIVITIES OF DAILY LIVING (ADL)
ADLS_ACUITY_SCORE: 41
DRESS: INDEPENDENT
ADLS_ACUITY_SCORE: 41
ORAL_HYGIENE: INDEPENDENT
DRESS: STREET CLOTHES
LAUNDRY: WITH SUPERVISION
HYGIENE/GROOMING: INDEPENDENT
ADLS_ACUITY_SCORE: 41
ADLS_ACUITY_SCORE: 41
HYGIENE/GROOMING: INDEPENDENT
ADLS_ACUITY_SCORE: 41
ORAL_HYGIENE: INDEPENDENT
ADLS_ACUITY_SCORE: 41

## 2024-05-28 NOTE — PROGRESS NOTES
----------------------------------------------------------------------------------------------------------  [facility]  Psychiatry Progress Note  Hospital Day #7     Interim History:     The patient's care was discussed with the treatment team and chart notes were reviewed.    Vitals: VSS  Sleep: 7 hours (05/28/24 0600)  Scheduled medications: Took all scheduled medications as prescribed  Psychiatric PRN medications:   Friday 24:     acetaminophen (TYLENOL) tablet 650 mg, 650 mg at 05/24/24 0458    acetaminophen (TYLENOL) tablet 650 mg, 650 mg at 05/24/24 2017    methohexital (BREVITAL SODIUM) injection 90 mg at 05/24/24 1314    methohexital (BREVITAL SODIUM) injection 10 mg at 05/24/24 1318    niCARdipine 0.1 mg/mL (CARDENE) injection at 05/24/24 1323    oxyCODONE (ROXICODONE) tablet 5 mg at 05/24/24 0935    oxyCODONE (ROXICODONE) tablet 5 mg at 05/24/24 1804    zolpidem (AMBIEN) tablet 5 mg at 05/24/24 0056 Saturday 25:     acetaminophen (TYLENOL) tablet 650 mg, 650 mg at 05/25/24 0802    acetaminophen (TYLENOL) tablet 650 mg, 650 mg at 05/25/24 1457    albuterol (PROVENTIL HFA) inhaler 2 puffs at 05/25/24 0050    melatonin tablet 3 mg, 3 mg at 05/25/24 0050    melatonin tablet 3 mg, 3 mg at 05/25/24 6649 Sunday 26:    acetaminophen (TYLENOL) tablet 650 mg, 650 mg at 05/26/24 0925    acetaminophen (TYLENOL) tablet 650 mg, 650 mg at 05/26/24 1401    Lidocaine (LIDOCARE) 4% Patch 1 patch at 05/26/24 0924    oxyCODONE (ROXICODONE) tablet 5 mg at 05/26/24 0255      Monday 27:     acetaminophen (TYLENOL) tablet 650 mg, 650 mg at 05/27/24 0803    gabapentin (NEURONTIN) capsule 100 mg, 100 mg at 05/27/24 0900    melatonin tablet 3 mg, 3 mg at 05/28/24 0017    menthol (Topical Analgesic) 2.5% (BENGAY VANISHING SCENT) 2.5 % topical gel, Given at 05/27/24 2018     Staff Report:  No acute events over the weekend. No acute safety concerns. First session and ECT was on  "Friday. See staff note for additional details.        Subjective:     Patient Interview:  Randi was seen in the conference room. She reports \"I have not been having a good day; it's been a difficult few days\". She is thankful for the provisions - such as the bed and socks. Patient is extremely teary and anxious - she says that her neck pain and migraine have been worsened.   Patient couldn't think of certain words and says that \"it is disconcerting\". She is concerned that it can be a side effect of ECT.   Patient says she was able to reflect post ECT which could be helping with lowering her anxiety and depression scores over the weekend. She also has been using her TENS unit which she says has been instrumental for her.   Patient says she has been trying to get along with other peers which has been helping her mind off things - and it is a good change from being at home in a lonely environment. She reports having some anxiety due to the ECT appointment tomorrow.  Patient says that she lost track of the pain medications post ECT. She realizes she is addicted to pain medications but, she states that she needs it for the pain. She recounts an experience where she had a verbal disagreement with a nurse regarding this.   Patient reports baseline sleep of 3 hours vs in-patient she has been able to get 5-6 hours of sleep due to having the headphones.     States that TMS was harder than ECT - and that it was harder on her body. But, with ECT she says being anesthetized was helpful - but wishes if she can be moved to earlier in the day so that she isn't fasting and not hydrating for a really long time.     Patient inquires about laces on her brace nd also wonders about paying bills and if her  can be looped into the process  Patient wonders about her upcoming therapy appointment ([facility]) -  patient informed that outpatient services aren't available in-patient.    ROS:  Patient has pain in her neck and " "shoulder.  Patient denies acute concerns     Objective:     Vitals:  /68 (BP Location: Right arm)   Pulse 75   Temp 98.2  F (36.8  C) (Oral)   Resp 14   Ht 1.676 m (5' 6\")   Wt 88.4 kg (194 lb 14.4 oz)   LMP  (LMP Unknown)   SpO2 95%   BMI 31.46 kg/m      Allergies:  Allergies   Allergen Reactions    Serotonin Reuptake Inhibitors (Ssris) Anxiety, Difficulty breathing, Headache, Palpitations and Shortness Of Breath    Buspirone      The patient states she had serotonin syndrome    Cephalexin      Other reaction(s): unknown rxn.    Desvenlafaxine      Serotonin syndrome    Diclofenac Sodium [Diclofenac]      Serotonin syndrome and restless legs syndrome    Gabapentin      Drove on the wrong side of the highway    Levofloxacin      \"CAN'T REMEMBER\"    Penicillins      \"SORES IN MOUTH\"    Riluzole Difficulty breathing and Swelling    Sulfa Antibiotics      \"PT DOES NOT KNOW WHAT THE REACTION WAS\"    Topiramate Other (See Comments)     Frequent urination       Current Medications:  Scheduled:  Current Facility-Administered Medications   Medication Dose Route Frequency Provider Last Rate Last Admin    acetaminophen (TYLENOL) tablet 650 mg  650 mg Oral Q4H PRN Wilner Parker MD   650 mg at 05/27/24 0803    albuterol (PROVENTIL HFA/VENTOLIN HFA) inhaler  2 puff Inhalation Q6H PRN Wilner Parker MD   2 puff at 05/25/24 0050    allopurinol (ZYLOPRIM) tablet 300 mg  300 mg Oral QPM Nallely Barber MD   300 mg at 05/27/24 1923    alum & mag hydroxide-simethicone (MAALOX) suspension 30 mL  30 mL Oral Q4H PRN Wilner Parker MD        ascorbic acid tablet 1,000 mg  1,000 mg Oral Daily Wilner Parker MD   1,000 mg at 05/27/24 0803    benzonatate (TESSALON) capsule 200 mg  200 mg Oral TID PRN Wilner Parker MD   200 mg at 05/21/24 2300    cholecalciferol (VITAMIN D3) capsule 250 mcg  250 mcg Oral Weekly Wilner Parker MD   250 mcg at 05/22/24 0849    cyanocobalamin (VITAMIN B-12) sublingual " tablet 500 mcg  500 mcg Sublingual Daily Wilner Parker MD   500 mcg at 05/27/24 0803    ethacrynic acid (EDECRIN) half-tab 12.5 mg  12.5 mg Oral Daily Wilner Parker MD   12.5 mg at 05/27/24 0803    fluticasone-vilanterol (BREO ELLIPTA) 100-25 MCG/ACT inhaler 1 puff  1 puff Inhalation Daily Wilner Parker MD   1 puff at 05/27/24 0803    And    umeclidinium (INCRUSE ELLIPTA) 62.5 MCG/ACT inhaler 1 puff  1 puff Inhalation Daily Wilner Parker MD   1 puff at 05/27/24 0803    gabapentin (NEURONTIN) capsule 100 mg  100 mg Oral Q6H PRN Wilner Parker MD   100 mg at 05/27/24 0900    gabapentin (NEURONTIN) capsule 400 mg  400 mg Oral At Bedtime Tejinder Correa MD   400 mg at 05/27/24 2215    guaiFENesin (MUCINEX) 12 hr tablet 1,200 mg  1,200 mg Oral BID PRN Wilner Parker MD        HOLD MEDICATION   Does not apply HOLD Alvina Garg DO        Hold Medications for ECT (select and list medications to be held)   Does not apply HOLD Tejinder Correa MD        Hold Medications for ECT (select and list medications to be held)   Does not apply HOLD Boo Riley MD        Lidocaine (LIDOCARE) 4 % Patch 1 patch  1 patch Transdermal Q24H Lulu Zacarias MD   1 patch at 05/26/24 0924    magnesium oxide (MAG-OX) tablet 400 mg  400 mg Oral BID Anna Brewer PA-C   400 mg at 05/27/24 1924    melatonin tablet 3 mg  3 mg Oral At Bedtime PRN Wilnre Parker MD   3 mg at 05/28/24 0017    menthol (Topical Analgesic) 2.5% (BENGAY VANISHING SCENT) 2.5 % topical gel   Topical Q6H PRN Anna Brewer PA-C   Given at 05/27/24 2018    naloxone (NARCAN) injection 0.2 mg  0.2 mg Intravenous Q2 Min PRN Wilner Parker MD        Or    naloxone (NARCAN) injection 0.4 mg  0.4 mg Intravenous Q2 Min PRN Wilner Parker MD        Or    naloxone (NARCAN) injection 0.2 mg  0.2 mg Intramuscular Q2 Min PRN Wilner Parker MD        Or    naloxone (NARCAN) injection 0.4 mg  0.4 mg Intramuscular Q2  Min PRN Wilner Parker MD        OLANZapine (zyPREXA) tablet 2.5 mg  2.5 mg Oral TID PRN Wilner Parker MD        Or    OLANZapine (zyPREXA) injection 2.5 mg  2.5 mg Intramuscular TID PRN Wilner Parker MD        ondansetron (ZOFRAN ODT) ODT tab 4 mg  4 mg Oral Q6H PRN Wilner Parker MD        oxyCODONE (ROXICODONE) tablet 5 mg  5 mg Oral Q6H Nallely Barber MD   5 mg at 05/28/24 0622    And    oxyCODONE (ROXICODONE) tablet 5 mg  5 mg Oral Daily PRN Nallely Barber MD   5 mg at 05/26/24 0255    pantoprazole (PROTONIX) EC tablet 40 mg  40 mg Oral Daily Wilner Parker MD   40 mg at 05/27/24 0803    polyethylene glycol (MIRALAX) Packet 17 g  17 g Oral Daily PRN Wilner Parker MD        potassium chloride jeannette ER (KLOR-CON M20) CR tablet 20 mEq  20 mEq Oral Daily Wilner Parker MD   20 mEq at 05/27/24 0803    rOPINIRole (REQUIP) tablet 2 mg  2 mg Oral TID Nallely Barber MD   2 mg at 05/27/24 2301    sodium chloride (OCEAN) 0.65 % nasal spray 1 spray  1 spray Both Nostrils Q1H PRN Anna Brewer PA-C        SYNTHROID (Brand only - 75 mcg strength tablets)  150 mcg Oral Once per day on Monday Tuesday Wednesday Thursday Friday Saturday Tejinder Correa MD   150 mcg at 05/28/24 0621    And    SYNTHROID tablet 75 mcg (brand only)  75 mcg Oral Every Sunday Tejinder Correa MD   75 mcg at 05/26/24 0620    zolpidem (AMBIEN) tablet 5 mg  5 mg Oral At Bedtime PRN Wilner Parker MD   5 mg at 05/24/24 0056       PRN:  Current Facility-Administered Medications   Medication Dose Route Frequency Provider Last Rate Last Admin    acetaminophen (TYLENOL) tablet 650 mg  650 mg Oral Q4H PRN Wilner Parker MD   650 mg at 05/27/24 0803    albuterol (PROVENTIL HFA/VENTOLIN HFA) inhaler  2 puff Inhalation Q6H PRN Wilner Parker MD   2 puff at 05/25/24 0050    allopurinol (ZYLOPRIM) tablet 300 mg  300 mg Oral QPM Nallely Barber MD   300 mg at 05/27/24 1923    alum & mag hydroxide-simethicone  (MAALOX) suspension 30 mL  30 mL Oral Q4H PRN Wilner Parker MD        ascorbic acid tablet 1,000 mg  1,000 mg Oral Daily Wilner Parker MD   1,000 mg at 05/27/24 0803    benzonatate (TESSALON) capsule 200 mg  200 mg Oral TID PRN Wilner Parker MD   200 mg at 05/21/24 2300    cholecalciferol (VITAMIN D3) capsule 250 mcg  250 mcg Oral Weekly Wilner Parker MD   250 mcg at 05/22/24 0849    cyanocobalamin (VITAMIN B-12) sublingual tablet 500 mcg  500 mcg Sublingual Daily Wilner Parker MD   500 mcg at 05/27/24 0803    ethacrynic acid (EDECRIN) half-tab 12.5 mg  12.5 mg Oral Daily Wilner Parker MD   12.5 mg at 05/27/24 0803    fluticasone-vilanterol (BREO ELLIPTA) 100-25 MCG/ACT inhaler 1 puff  1 puff Inhalation Daily Wilner Parker MD   1 puff at 05/27/24 0803    And    umeclidinium (INCRUSE ELLIPTA) 62.5 MCG/ACT inhaler 1 puff  1 puff Inhalation Daily Wilner Parker MD   1 puff at 05/27/24 0803    gabapentin (NEURONTIN) capsule 100 mg  100 mg Oral Q6H PRN Wilner Parker MD   100 mg at 05/27/24 0900    gabapentin (NEURONTIN) capsule 400 mg  400 mg Oral At Bedtime Tejinder Correa MD   400 mg at 05/27/24 2215    guaiFENesin (MUCINEX) 12 hr tablet 1,200 mg  1,200 mg Oral BID PRN Wilner Parker MD        HOLD MEDICATION   Does not apply HOLD Alvina Garg DO        Hold Medications for ECT (select and list medications to be held)   Does not apply HOLD Tejinder Correa MD        Hold Medications for ECT (select and list medications to be held)   Does not apply HOLD Boo Riley MD        Lidocaine (LIDOCARE) 4 % Patch 1 patch  1 patch Transdermal Q24H Lulu Zacarias MD   1 patch at 05/26/24 0924    magnesium oxide (MAG-OX) tablet 400 mg  400 mg Oral BID Anna Brewer PA-C   400 mg at 05/27/24 1924    melatonin tablet 3 mg  3 mg Oral At Bedtime PRN Wilner Parker MD   3 mg at 05/28/24 0017    menthol (Topical Analgesic) 2.5% (BENGAY VANISHING SCENT) 2.5 %  topical gel   Topical Q6H PRN Anna Brewer PA-C   Given at 05/27/24 2018    naloxone (NARCAN) injection 0.2 mg  0.2 mg Intravenous Q2 Min PRN Wilner Parker MD        Or    naloxone (NARCAN) injection 0.4 mg  0.4 mg Intravenous Q2 Min PRN Wilner Parker MD        Or    naloxone (NARCAN) injection 0.2 mg  0.2 mg Intramuscular Q2 Min PRN Wilner Parker MD        Or    naloxone (NARCAN) injection 0.4 mg  0.4 mg Intramuscular Q2 Min PRN Wilner Parker MD        OLANZapine (zyPREXA) tablet 2.5 mg  2.5 mg Oral TID PRN Wilner Parker MD        Or    OLANZapine (zyPREXA) injection 2.5 mg  2.5 mg Intramuscular TID PRN Wilner Parker MD        ondansetron (ZOFRAN ODT) ODT tab 4 mg  4 mg Oral Q6H PRN Wilner Parker MD        oxyCODONE (ROXICODONE) tablet 5 mg  5 mg Oral Q6H Nallely Barber MD   5 mg at 05/28/24 0622    And    oxyCODONE (ROXICODONE) tablet 5 mg  5 mg Oral Daily PRN Nallely Barber MD   5 mg at 05/26/24 0255    pantoprazole (PROTONIX) EC tablet 40 mg  40 mg Oral Daily Wilner Parker MD   40 mg at 05/27/24 0803    polyethylene glycol (MIRALAX) Packet 17 g  17 g Oral Daily PRN Wilner Parker MD        potassium chloride jeannette ER (KLOR-CON M20) CR tablet 20 mEq  20 mEq Oral Daily Wilner Parker MD   20 mEq at 05/27/24 0803    rOPINIRole (REQUIP) tablet 2 mg  2 mg Oral TID Nallely Barber MD   2 mg at 05/27/24 2301    sodium chloride (OCEAN) 0.65 % nasal spray 1 spray  1 spray Both Nostrils Q1H PRN Anna Brewer PA-C        SYNTHROID (Brand only - 75 mcg strength tablets)  150 mcg Oral Once per day on Monday Tuesday Wednesday Thursday Friday Saturday Tejinder Correa MD   150 mcg at 05/28/24 0621    And    SYNTHROID tablet 75 mcg (brand only)  75 mcg Oral Every Sunday Tejinder Correa MD   75 mcg at 05/26/24 0620    zolpidem (AMBIEN) tablet 5 mg  5 mg Oral At Bedtime PRN Wilner Parker MD   5 mg at 05/24/24 0056       Labs and Imaging:  New results:   No  results found for this or any previous visit (from the past 24 hour(s)).      Data this admission:  - CBC elevated Hgb 17.7  - CMP unremarkable  - TSH normal  - UDS THC positive  - Hgb A1c normal  - Lipids LDL mildly elevated 103 but otherwise unremarkable  - Vitamin B12 unremarkable  - Folate unremarkable  - Vitamin D unremarkable  - Urinalysis unremarkable  - EKG normal sinus rhythm, RBBB QTc 458    Potential Drug Interactions:   Per [employer], combinations of the following drugs has a rating of D, demonstrating that the medications may interact with each other in a clinically significant manner and a patient-specific assessment was made and determined that the benefits outweighed the risks.   - Gabapentin, oxycodone, and Magnesium oxide The following risks include CNS depression.   - Gabapentin and levothyroxine. The following risks include magnesium salts decreasing serum concentration of levothyroxine.     Per [employer], combinations of the following drugs has a rating of C demonstrating that agents may interact in a clinically significant manner but the benefits of the combination of medications often outweigh the risk.   - Ropinirole, oxycodone, and gabapentin. The following risks include CNS depression.   - allopurinol and ethacrynic acid. The following risks include ethacrynic acid increasing concentration of allopurinol.  - ethacrynic acid and oxycodone.  The following risks include oxycodone may enhanced the toxic effects of ethacrynic acid  Treatment team will monitor  for potential side effects and re-evaluate as needed.           Mental Status Exam:     Oriented to:  Grossly Oriented  General:  Awake and Alert  Appearance:  appears stated age and Grooming is adequate  Behavior/Attitude:  cooperative and open  Eye Contact:  appropriate  Psychomotor: normal and no evidence of tics, dystonia, or tardive dyskinesia no catatonia present  Speech:  appropriate volume/tone and talkative  Language: Fluent in  "English with appropriate syntax and vocabulary.  Mood:  \"not so good; I am overwhelmed\"  Affect:  labile, tearful, and anxious  Thought Process:  coherent, goal directed, and circumstantial  Thought Content:  suicidal ideation (passive), No HI, No VH, and No AH; No apparent delusions  Associations:  intact  Insight:  fair due to recognizing the circumstances of her decompensation.  Judgment:  fair due to willingness to engage in ECT and better her MH  Impulse control: fair  Attention Span:  grossly intact  Concentration:  grossly intact  Recent and Remote Memory:  not formally assessed  Fund of Knowledge:  average  Muscle Strength and Tone: normal  Gait and Station: Normal and notes difficulty with gait     Psychiatric Assessment     Randi Cleary is a 68 year old female previously diagnosed with MDD, recurrent severe, substance induced mood disorder, Unspecified Trauma, CHAVO, borderline personality disorder and alcohol use disorder who presented voluntarily with SI in the context of treatment resistant MDD. On admission she presented with significant symptoms of depression incuding emotional lability, low mood, hopelessness, amotivation, anxiety, anhedonia, low energy, insomnia low appetite, excess guilt and poor concentration. Her affect was dysphroic anjd axious at times with heavy perseveration on medico-surgical dignosises and passive SI causing incresed anxiety. She notes she has had a plan but no intent. Her history of extensive substance use, medical history and chronic pain contribute biologically. Her presentation is further complicated by borderline personality disorder and history of trauma. Additionally, she has a very limited support system, interpersonal conflicts with friends and her spouse. However her last hospitalization was in the 1980's for a suicide attempt via prozac overdose and has been engaged in treatment and present voluntarily. Her presentation is consistent with decompensated Major " Depressive disorder, recurrent severe. Her disorder has been treatment resistant including to TMS and at this time, ECT would be the best course of action to address her symptom severity. Patient warrants inpatient psychiatric hospitalization to maintain her safety.      Psychiatric Plan by Diagnosis      Today's changes:  - none      # MDD, recurrent, severe, with anxious distress  - Patient suffers from treatment resistant depression and has trialed many medications. At this point treatment team will need to do a chart review to review medication trials and determine the best medication to address depressive symptoms long term  - Pt will start ECT 5/24 : Hold high-dose gabapentin/pregabalin after 6pm on the day before ECT (to permit adequate seizure)   - Next ECT date: 5/29  - ECT sessions completed: 1     #Insomnia  - Zolpidem 5mg qpm prn    Pertinent Labs/Monitoring:   - EKG 5/22 - Qtc 458 NSR, RBBB     Additional Plans:  - Patient will be treated in therapeutic milieu with appropriate individual and group therapies as described     Psychiatric Hospital Course:      Radni Cleary was admitted to [facility] as a voluntary patient. ECT and Medicine consult was placed in order to get the patient cleared for an acute series of ECT. Plan for a Right Unilateral ultra-brief pulse given the patient's age and concern for memory and cognitive deficits.     The risks, benefits, alternatives, and side effects were discussed and understood by the patient     Medical Assessment and Plan     Medical diagnoses to be addressed this admission:    # Hypothyroidism  - PTA 150mcg M-Sa, 75mcg on Eprez     # KYAW   - Doesn't use CPAP at home, sleep study on 06/2024      # RLS  - Continue PTA Ropinirole 2 mg PO TID  - Gabapentin 400mg qpm  - Magnesium Citrated changed to Magnesium Oxide 400mg BID (per patient and medicine)    #RBBB:  Previously on other EKGs    #Cervical radiculopathy and myelopathy s/p cervical laminoplasty 12/2021     # Chronic Pain  - Continue PTA Roxicodone 5 mg q6h + 5 mg PRN (for max daily dose of 25 mg)  - Menthol 2.5% topical gel q6hrs prn  - TENS Unit     #Dizziness  #Headache  Reports hx of migraines, no vision changes or hearing changes. notes this has been ongoing since she had her spinal surgery.   - CT head 5/22 unremarkable    #Gout  - Nutrition consult  - Allopurinol 300mg qpm  - Flares in left podagra but none currently    #Vitamin B12 Deficiency  - Vit B12 500mcg qday    #Vitamin C Deficiency  - Ascorbic Acid 1000mg    #Vitamin D Deficiency  - 250mcg weekly    #COPD  - Breo Ellipta 1 puff daily   - Incruse Ellipta 1 puff daily  - Tessalon cap 200mg TID prn  - Albuterol 2 puffs q6hr prn  - Mucinex 1200mg BID prn     #GERD  #Hiatal Hernia  - Protonix 40mg daily    #Possible pulmonary HTN  #HTN  Per Medicine note, pt follows with Cardiology here and has scheduled right heart catheterization for 06/12/2024. Has follow up with Cardiology in clinic scheduled for 06/19/2024. PTA edecrin 12.5mg daily and potassium supplement. Electrolytes and renal function stable.Recently lost 50lbs and BP has improved.   - Ethacrynic Acid 12.5mg qday   -Hold Edecrin on date of ECT, continue 12.5mg daily for now  -Continue potassium supplement   - Klorcon 20meq daily     #Hemochromatosis, hereditary  - Hgb Stable at ~17    #Hepatic Steatosis  - Stable  - LFTs wnl, no abdominal pain, distention, N/V    #History of breast cancer s/p mastectomy, chemo and XRT:   - currently in remission      #Alcohol use disorder, in remission:   - No current use. Two years sober     #Hx of pheochromocytoma s/p left adrenalectomy 08/2017:   - Stable  - Follows Endocrinology for this.     #Psoriasis:  - Noted on R hang  - Uses scalp brush    Medical course: Patient was physically examined by the ED prior to being transferred to the unit and was found to be medically stable and appropriate for admission. Medicine consult was done to provide clearance for  ECT. Due to patient's persistent neck pain after surgery in 2021, CT Head was done which was negative. Oxycodone changed from q6hr prn to scheduled with an extra 5mg in the day as needed per PTA regimen. Patient continued to express some nerve pain so gabapentin was increased to 400mg.      Consults:  none    Medical course: Patient was physically examined by the ED prior to being transferred to the unit and was found to be medically stable and appropriate for admission.     Consults: Medicine for ECT Clearance, ECT Consult, Nutrition     Checklist     Legal Status: Voluntary     Safety Assessment:   Behavioral Orders   Procedures    Code 1 - Restrict to Unit    Code 2 - 1:1 Staff Supervision     For coming down to ECT only    Discontinue 1:1 attendant for suicide risk     Order Specific Question:   I have performed an in person assessment of the patient     Answer:   Based on this assessment the patient no longer requires a one on one attendant at this point in time.     Order Specific Question:   Rationale     Answer:   Patient States able to remain safe in hospital     Order Specific Question:   Rationale     Answer:   Modifications to care environment made to mitigate safety risk     Order Specific Question:   Rationale     Answer:   Routine observations are sufficient to monitor safety.    Electroconvulsive therapy     Series of up to 12 treatments. Begin Date: 5/24/24     Treating Psychiatrist providing ECT:  Ruthann     Notified on:  5/21/24    Electroconvulsive therapy     Series of up to 12 treatments. Begin Date: 5/24/24     Treating Psychiatrist providing ECT:  Ruthann     Notified on:  5/21/24    Fall precautions    Routine Programming     As clinically indicated    Status 15     Every 15 minutes.    Suicide precautions: Suicide Risk: MODERATE; Clinical rationale to override score: lack of access to a plan for self-harm     Order Specific Question:   Suicide Risk     Answer:   MODERATE     Order Specific  Question:   Clinical rationale to override score:     Answer:   lack of access to a plan for self-harm       Risk Assessment:  Risk for harm is moderate-high.  Risk factors: SI, maladaptive coping, trauma, and past behaviors  Protective factors: engaged in treatment     SIO: Discontinued    Disposition: Pending stabilization, medication optimization, & development of a safe discharge plan.     Attestations   Patient seen and discussed with attending physician, Dr. Jairo Choudhary who is in agreement with my assessment and plan.    Alvina Garg D.O.  Psychiatry Resident  [facility]     Attestation:  This patient has been seen and evaluated by me, Jairo Choudhary MD.  I have discussed this patient with the house staff team including the resident and medical student and I agree with the findings and plan in this note.    I have reviewed today's vital signs, medications, labs and imaging. Jairo Choudhary MD , PhD.

## 2024-05-28 NOTE — PLAN OF CARE
Problem: Psychotic Signs/Symptoms  Goal: Improved Behavioral Control (Psychotic Signs/Symptoms)  Outcome: Progressing     Problem: Anxiety  Goal: Anxiety Reduction or Resolution  Outcome: Progressing  Intervention: Promote Anxiety Reduction  Recent Flowsheet Documentation  Taken 5/27/2024 1900 by Kaushik Monsalve, RN  Family/Support System Care:   presence promoted   self-care encouraged   Goal Outcome Evaluation:    Plan of Care Reviewed With: patient      Patient was present in the milieu the entire shift. Was observed playing cards and interacting appropriately with peers/staff. Affect was bright and mood was peasant, but patient was emotional and tearful after making a phone call. Patient endorsed mild anxiety/depression and denied other mental health issues. Vitals were within range(see flow sheet), appetite was good, and patient was compliant with medication. Patient was alert and oriented x4, speech was clear and logical, and patient had a clear insight of her situations and environment. No major depressive or psychotic behaviors noted. Will continue to monitor.

## 2024-05-28 NOTE — PLAN OF CARE
Assessment/Intervention/Current Symtoms and Care Coordination:  Chart reviewed and patient met with team,   Discussed patient progress, symptomology, and response to treatment.  Discussed the discharge plan and any potential impediments to discharge.     Patient continues to report depressive sx's- however has been social on unit- engaging with peers, playing cards, attending groups.   Tearful over the wkend after a phone call.  Patient now has medical bed which has helped comfort wise.     Patient completed first ECT 5/24/24 without complication.  #2 Scheduled for tomorrow     Discharge Plan or Goal:  Patient will return home when stable  Psychiatry  55 Plus?        Barriers to Discharge:  Severity of depression/inability to function  Initiation of ECT     Referral Status:  None today     Legal Status:  Voluntary     Contacts:  [facility]    Primary Physician: Laith Constantino  Family Members: Justin Cleary (Spouse) [phone]        Upcoming Meetings and Dates/Important Information and next steps:  Team update:  Wed

## 2024-05-28 NOTE — PLAN OF CARE
"Pt is visible in the milieu. Presented with a bright affect. Pleasant and cooperative. She takes her food tray and eats at the lounge area. Pt nutrition and hydration is adequate. She was observed to be interacting appropriately with peers. Upon assessment, pt endorsed anxiety at 6/10, depression at 4/10 , denies SI and hallucinations. She complained of back pain at 7-8 level, managed with scheduled Oxycodone. Pt complained of headache, PRN Tylenol was given and it was reported that medication was not effective. She plans to take shower this shift. She uses music to help herself relax. Pt was tearful during assessment, but perked up right back. She said that she doesn't want to be a burden to anyone. [facility] and appeared to be snoozing. She did not join any groups this shift.     Problem: Adult Inpatient Plan of Care  Goal: Patient-Specific Goal (Individualized)  Description: You can add care plan individualizations to a care plan. Examples of Individualization might be:  \"Parent requests to be called daily at 9am for status\", \"I have a hard time hearing out of my right ear\", or \"Do not touch me to wake me up as it startles  me\".  Outcome: Progressing  Goal: Absence of Hospital-Acquired Illness or Injury  Outcome: Progressing  Intervention: Prevent Skin Injury  Recent Flowsheet Documentation  Taken 5/28/2024 1100 by Yoselin Garcia RN  Body Position: position changed independently     Problem: Adult Behavioral Health Plan of Care  Goal: Adheres to Safety Considerations for Self and Others  Outcome: Progressing  Intervention: Develop and Maintain Individualized Safety Plan  Recent Flowsheet Documentation  Taken 5/28/2024 1100 by Yoselin Garcia RN  Safety Measures:   safety rounds completed   environmental rounds completed     Problem: Suicide Risk  Goal: Absence of Self-Harm  Outcome: Progressing  Intervention: Promote Psychosocial Wellbeing  Recent Flowsheet " Documentation  Taken 5/28/2024 1100 by Yoselin Garcia, RN  Family/Support System Care:   presence promoted   self-care encouraged     Problem: Depression  Goal: Improved Mood  Outcome: Progressing  Intervention: Monitor and Manage Depressive Symptoms  Recent Flowsheet Documentation  Taken 5/28/2024 1100 by Yoselin Garcia, RN  Family/Support System Care:   presence promoted   self-care encouraged     Problem: Suicidal Behavior  Goal: Suicidal Behavior is Absent or Managed  Outcome: Progressing     Problem: Sleep Disturbance  Goal: Adequate Sleep/Rest  Outcome: Progressing     Problem: Fall Injury Risk  Goal: Absence of Fall and Fall-Related Injury  Outcome: Progressing   Goal Outcome Evaluation:    Plan of Care Reviewed With: patient

## 2024-05-28 NOTE — PLAN OF CARE
"  Problem: Suicide Risk  Goal: Absence of Self-Harm  Outcome: Progressing  Intervention: Assess Risk to Self and Maintain Safety  Recent Flowsheet Documentation  Taken 5/28/2024 1700 by Alissa Arnold RN  Self-Harm Prevention: other (see comments)  Intervention: Establish Safety Plan and Continuity of Care  Recent Flowsheet Documentation  Taken 5/28/2024 1700 by Alissa Arnold RN  Safe Transition Promotion:   personal safety plan developed   access to lethal means addressed     Problem: Fall Injury Risk  Goal: Absence of Fall and Fall-Related Injury  Outcome: Progressing   Goal Outcome Evaluation:    Pt was visible in the milieu. She attended community group and  was observed socializing with staff and peers. Affect bright. Mood calm. Pt was polite and friendly on approach .She endorsed pain 8/10, anxiety 5/10 she stated, \"My mood was tearful this morning but I feel better this evening\". Pt denies SI/SIB/HI/AVH. Pt was cooperative, compliant with medications and contracted for safety. Nutrition and hydration adequate.  Pt  visited and it went . No behavioral concerns well. Pt requested and received Melatonin for sleep  "

## 2024-05-29 ENCOUNTER — ANESTHESIA EVENT (OUTPATIENT)
Dept: BEHAVIORAL HEALTH | Facility: CLINIC | Age: 69
DRG: 881 | End: 2024-05-29
Payer: MEDICARE

## 2024-05-29 ENCOUNTER — ANESTHESIA (OUTPATIENT)
Dept: BEHAVIORAL HEALTH | Facility: CLINIC | Age: 69
DRG: 881 | End: 2024-05-29
Payer: MEDICARE

## 2024-05-29 ENCOUNTER — APPOINTMENT (OUTPATIENT)
Dept: BEHAVIORAL HEALTH | Facility: CLINIC | Age: 69
DRG: 881 | End: 2024-05-29
Payer: MEDICARE

## 2024-05-29 PROCEDURE — 250N000013 HC RX MED GY IP 250 OP 250 PS 637

## 2024-05-29 PROCEDURE — 90870 ELECTROCONVULSIVE THERAPY: CPT | Performed by: STUDENT IN AN ORGANIZED HEALTH CARE EDUCATION/TRAINING PROGRAM

## 2024-05-29 PROCEDURE — 90870 ELECTROCONVULSIVE THERAPY: CPT

## 2024-05-29 PROCEDURE — 99232 SBSQ HOSP IP/OBS MODERATE 35: CPT | Mod: 25 | Performed by: PSYCHIATRY & NEUROLOGY

## 2024-05-29 PROCEDURE — 250N000013 HC RX MED GY IP 250 OP 250 PS 637: Performed by: PHYSICIAN ASSISTANT

## 2024-05-29 PROCEDURE — 250N000009 HC RX 250: Performed by: STUDENT IN AN ORGANIZED HEALTH CARE EDUCATION/TRAINING PROGRAM

## 2024-05-29 PROCEDURE — 370N000017 HC ANESTHESIA TECHNICAL FEE, PER MIN: Performed by: STUDENT IN AN ORGANIZED HEALTH CARE EDUCATION/TRAINING PROGRAM

## 2024-05-29 PROCEDURE — 90870 ELECTROCONVULSIVE THERAPY: CPT | Performed by: PSYCHIATRY & NEUROLOGY

## 2024-05-29 PROCEDURE — 250N000011 HC RX IP 250 OP 636: Performed by: STUDENT IN AN ORGANIZED HEALTH CARE EDUCATION/TRAINING PROGRAM

## 2024-05-29 PROCEDURE — 124N000002 HC R&B MH UMMC

## 2024-05-29 RX ORDER — HYDROMORPHONE HCL IN WATER/PF 6 MG/30 ML
0.4 PATIENT CONTROLLED ANALGESIA SYRINGE INTRAVENOUS EVERY 5 MIN PRN
Status: DISCONTINUED | OUTPATIENT
Start: 2024-05-29 | End: 2024-05-29

## 2024-05-29 RX ORDER — FENTANYL CITRATE 50 UG/ML
50 INJECTION, SOLUTION INTRAMUSCULAR; INTRAVENOUS EVERY 5 MIN PRN
Status: DISCONTINUED | OUTPATIENT
Start: 2024-05-29 | End: 2024-05-29

## 2024-05-29 RX ORDER — ONDANSETRON 4 MG/1
4 TABLET, ORALLY DISINTEGRATING ORAL EVERY 30 MIN PRN
Status: DISCONTINUED | OUTPATIENT
Start: 2024-05-29 | End: 2024-05-29

## 2024-05-29 RX ORDER — SODIUM CHLORIDE, SODIUM LACTATE, POTASSIUM CHLORIDE, CALCIUM CHLORIDE 600; 310; 30; 20 MG/100ML; MG/100ML; MG/100ML; MG/100ML
INJECTION, SOLUTION INTRAVENOUS CONTINUOUS
Status: DISCONTINUED | OUTPATIENT
Start: 2024-05-29 | End: 2024-05-29

## 2024-05-29 RX ORDER — ONDANSETRON 2 MG/ML
4 INJECTION INTRAMUSCULAR; INTRAVENOUS EVERY 30 MIN PRN
Status: DISCONTINUED | OUTPATIENT
Start: 2024-05-29 | End: 2024-05-29

## 2024-05-29 RX ORDER — OXYCODONE HYDROCHLORIDE 5 MG/1
5 TABLET ORAL
Status: DISCONTINUED | OUTPATIENT
Start: 2024-05-29 | End: 2024-05-29

## 2024-05-29 RX ORDER — NALOXONE HYDROCHLORIDE 0.4 MG/ML
0.1 INJECTION, SOLUTION INTRAMUSCULAR; INTRAVENOUS; SUBCUTANEOUS
Status: DISCONTINUED | OUTPATIENT
Start: 2024-05-29 | End: 2024-05-29

## 2024-05-29 RX ORDER — HYDROMORPHONE HCL IN WATER/PF 6 MG/30 ML
0.2 PATIENT CONTROLLED ANALGESIA SYRINGE INTRAVENOUS EVERY 5 MIN PRN
Status: DISCONTINUED | OUTPATIENT
Start: 2024-05-29 | End: 2024-05-29

## 2024-05-29 RX ORDER — METHOHEXITAL IN WATER/PF 100MG/10ML
SYRINGE (ML) INTRAVENOUS PRN
Status: DISCONTINUED | OUTPATIENT
Start: 2024-05-29 | End: 2024-05-29

## 2024-05-29 RX ORDER — FENTANYL CITRATE 50 UG/ML
25 INJECTION, SOLUTION INTRAMUSCULAR; INTRAVENOUS EVERY 5 MIN PRN
Status: DISCONTINUED | OUTPATIENT
Start: 2024-05-29 | End: 2024-05-29

## 2024-05-29 RX ORDER — NICARDIPINE HCL-0.9% SOD CHLOR 1 MG/10 ML
SYRINGE (ML) INTRAVENOUS PRN
Status: DISCONTINUED | OUTPATIENT
Start: 2024-05-29 | End: 2024-05-29

## 2024-05-29 RX ORDER — OXYCODONE HYDROCHLORIDE 5 MG/1
10 TABLET ORAL
Status: DISCONTINUED | OUTPATIENT
Start: 2024-05-29 | End: 2024-05-29

## 2024-05-29 RX ORDER — DEXAMETHASONE SODIUM PHOSPHATE 4 MG/ML
4 INJECTION, SOLUTION INTRA-ARTICULAR; INTRALESIONAL; INTRAMUSCULAR; INTRAVENOUS; SOFT TISSUE
Status: DISCONTINUED | OUTPATIENT
Start: 2024-05-29 | End: 2024-05-29

## 2024-05-29 RX ADMIN — OXYCODONE HYDROCHLORIDE 5 MG: 5 TABLET ORAL at 00:28

## 2024-05-29 RX ADMIN — Medication 1000 MCG: at 11:30

## 2024-05-29 RX ADMIN — ROPINIROLE HYDROCHLORIDE 2 MG: 2 TABLET, FILM COATED ORAL at 14:30

## 2024-05-29 RX ADMIN — Medication 1000 MG: at 13:00

## 2024-05-29 RX ADMIN — PANTOPRAZOLE SODIUM 40 MG: 40 TABLET, DELAYED RELEASE ORAL at 08:16

## 2024-05-29 RX ADMIN — ALLOPURINOL 300 MG: 300 TABLET ORAL at 19:57

## 2024-05-29 RX ADMIN — GABAPENTIN 400 MG: 400 CAPSULE ORAL at 23:01

## 2024-05-29 RX ADMIN — Medication: at 19:57

## 2024-05-29 RX ADMIN — POTASSIUM CHLORIDE 20 MEQ: 1500 TABLET, EXTENDED RELEASE ORAL at 13:00

## 2024-05-29 RX ADMIN — Medication 500 MCG: at 13:00

## 2024-05-29 RX ADMIN — Medication 12.5 MG: at 13:00

## 2024-05-29 RX ADMIN — Medication 500 MCG: at 11:35

## 2024-05-29 RX ADMIN — SUCCINYLCHOLINE CHLORIDE 90 MG: 20 INJECTION, SOLUTION INTRAMUSCULAR; INTRAVENOUS; PARENTERAL at 11:30

## 2024-05-29 RX ADMIN — OXYCODONE HYDROCHLORIDE 5 MG: 5 TABLET ORAL at 23:02

## 2024-05-29 RX ADMIN — Medication 250 MCG: at 13:00

## 2024-05-29 RX ADMIN — OXYCODONE HYDROCHLORIDE 5 MG: 5 TABLET ORAL at 11:02

## 2024-05-29 RX ADMIN — LEVOTHYROXINE SODIUM 150 MCG: 75 TABLET ORAL at 06:10

## 2024-05-29 RX ADMIN — ROPINIROLE HYDROCHLORIDE 2 MG: 2 TABLET, FILM COATED ORAL at 23:01

## 2024-05-29 RX ADMIN — GABAPENTIN 100 MG: 100 CAPSULE ORAL at 14:37

## 2024-05-29 RX ADMIN — OXYCODONE HYDROCHLORIDE 5 MG: 5 TABLET ORAL at 06:10

## 2024-05-29 RX ADMIN — MAGNESIUM OXIDE TAB 400 MG (241.3 MG ELEMENTAL MG) 400 MG: 400 (241.3 MG) TAB at 19:57

## 2024-05-29 RX ADMIN — ACETAMINOPHEN 650 MG: 325 TABLET, FILM COATED ORAL at 12:59

## 2024-05-29 RX ADMIN — MAGNESIUM OXIDE TAB 400 MG (241.3 MG ELEMENTAL MG) 400 MG: 400 (241.3 MG) TAB at 13:00

## 2024-05-29 RX ADMIN — ACETAMINOPHEN 650 MG: 325 TABLET, FILM COATED ORAL at 08:28

## 2024-05-29 RX ADMIN — UMECLIDINIUM 1 PUFF: 62.5 AEROSOL, POWDER ORAL at 08:16

## 2024-05-29 RX ADMIN — FLUTICASONE FUROATE AND VILANTEROL TRIFENATATE 1 PUFF: 100; 25 POWDER RESPIRATORY (INHALATION) at 08:16

## 2024-05-29 RX ADMIN — Medication 100 MG: at 11:30

## 2024-05-29 RX ADMIN — OXYCODONE HYDROCHLORIDE 5 MG: 5 TABLET ORAL at 17:43

## 2024-05-29 RX ADMIN — ACETAMINOPHEN 650 MG: 325 TABLET, FILM COATED ORAL at 03:31

## 2024-05-29 RX ADMIN — ROPINIROLE HYDROCHLORIDE 2 MG: 2 TABLET, FILM COATED ORAL at 19:57

## 2024-05-29 ASSESSMENT — ACTIVITIES OF DAILY LIVING (ADL)
ADLS_ACUITY_SCORE: 41

## 2024-05-29 ASSESSMENT — COPD QUESTIONNAIRES: COPD: 1

## 2024-05-29 NOTE — ANESTHESIA POSTPROCEDURE EVALUATION
Patient: Randi Cleary    Procedure: * No procedures listed *       Anesthesia Type:  General    Note:  Disposition: Inpatient   Postop Pain Control: Uneventful            Sign Out: Well controlled pain   PONV: No   Neuro/Psych: Uneventful            Sign Out: Acceptable/Baseline neuro status   Airway/Respiratory: Uneventful            Sign Out: Acceptable/Baseline resp. status   CV/Hemodynamics: Uneventful            Sign Out: Acceptable CV status; No obvious hypovolemia; No obvious fluid overload   Other NRE: NONE   DID A NON-ROUTINE EVENT OCCUR? No           Last vitals:  Vitals:    05/29/24 1141 05/29/24 1155 05/29/24 1210   BP: 122/62 (!) 145/93 (!) 146/79   Pulse: 76 99 94   Resp: 20 17 19   Temp: 36.6  C (97.8  F) 36.8  C (98.2  F) 36.8  C (98.2  F)   SpO2: 93% 92% 93%       Electronically Signed By: April Luciano MD  May 29, 2024  12:16 PM
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
## 2024-05-29 NOTE — ANESTHESIA CARE TRANSFER NOTE
Patient: Randi Cleary    Procedure: * No procedures listed *       Diagnosis: * No pre-op diagnosis entered *  Diagnosis Additional Information: No value filed.    Anesthesia Type:   General     Note:    Oropharynx: oropharynx clear of all foreign objects  Level of Consciousness: drowsy  Oxygen Supplementation: room air    Independent Airway: airway patency satisfactory and stable  Dentition: dentition unchanged  Vital Signs Stable: post-procedure vital signs reviewed and stable  Report to RN Given: handoff report given  Patient transferred to: PACU    Handoff Report: Identifed the Patient, Identified the Reponsible Provider, Reviewed the pertinent medical history, Discussed the surgical course, Reviewed Intra-OP anesthesia mangement and issues during anesthesia, Set expectations for post-procedure period and Allowed opportunity for questions and acknowledgement of understanding      Vitals:  Vitals Value Taken Time   /79 05/29/24 1210   Temp 36.8  C (98.2  F) 05/29/24 1210   Pulse 94 05/29/24 1210   Resp 19 05/29/24 1210   SpO2 93 % 05/29/24 1210       Electronically Signed By: April Luciano MD  May 29, 2024  12:16 PM

## 2024-05-29 NOTE — PROGRESS NOTES
"  ----------------------------------------------------------------------------------------------------------  [facility]  Psychiatry Progress Note  Hospital Day #8     Interim History:     The patient's care was discussed with the treatment team and chart notes were reviewed.    Vitals: VSS  Sleep: 5.25 hours (05/29/24 0637)  Scheduled medications: Took all scheduled medications as prescribed  Psychiatric PRN medications:   Last 24H PRN:     acetaminophen (TYLENOL) tablet 650 mg, 650 mg at 05/29/24 0331    melatonin tablet 3 mg, 3 mg at 05/28/24 2242     Staff Report:  No acute events over the weekend. No acute safety concerns. Second ECT session today. See staff note for additional details.     Discussed with Dr. Arnaldo Varela about potential pharmacological interventions to address the MDD. He recommends to consider a trial of serotonin modulator (e.g., vilazodone vs. vortioxetine) or novel agent Auvelity. Potentially augment with atypical antipsychotic (e.g., quetiapine or aripiprazole), though there are concerns given pre-diabetes.        Subjective:     Patient Interview:  Randi was seen in the conference room. She reports walking more yesterday, and noticing her good foot bring \"flat, and clubfoot like\". Her sciatica pain flared up yesterday. She reports difficulty walking and wishes to use a wheelchair because she has shoulder pain when using a walker. The wheelchair was delivered to the unit.     She reports having some anxiety about her upcoming ECT procedure today. She is worried about becoming manic after ECT. She appeared less anxious than before her first ECT appointment.     Winnie inquired about trying a Avulity in addition to her ECT procedure - she is hesitant to try it due to concerns of serotonin syndrome. Patient informed about the mechanism of action of Auvelity. Patient inquired about side effects and tendency to weight gain. She reports losing some " "weight and wonders if she can gain weight through taking medication. She was informed that common side effects. Patient also inquires about the cost of the medication and patient was informed appropriately. Patient stated that she wishes to stick with ECT first and not start a new medication now. She had no further concerns.     ROS:  Patient has pain in her neck and shoulder.  Patient denies acute concerns     Objective:     Vitals:  /81 (BP Location: Right arm)   Pulse 77   Temp 97.6  F (36.4  C) (Oral)   Resp 18   Ht 1.676 m (5' 6\")   Wt 88.4 kg (194 lb 14.4 oz)   LMP  (LMP Unknown)   SpO2 95%   BMI 31.46 kg/m      Allergies:  Allergies   Allergen Reactions    Serotonin Reuptake Inhibitors (Ssris) Anxiety, Difficulty breathing, Headache, Palpitations and Shortness Of Breath    Buspirone      The patient states she had serotonin syndrome    Cephalexin      Other reaction(s): unknown rxn.    Desvenlafaxine      Serotonin syndrome    Diclofenac Sodium [Diclofenac]      Serotonin syndrome and restless legs syndrome    Gabapentin      Drove on the wrong side of the highway    Levofloxacin      \"CAN'T REMEMBER\"    Penicillins      \"SORES IN MOUTH\"    Riluzole Difficulty breathing and Swelling    Sulfa Antibiotics      \"PT DOES NOT KNOW WHAT THE REACTION WAS\"    Topiramate Other (See Comments)     Frequent urination       Current Medications:  Scheduled:  Current Facility-Administered Medications   Medication Dose Route Frequency Provider Last Rate Last Admin    acetaminophen (TYLENOL) tablet 650 mg  650 mg Oral Q4H PRN Wilner Parker MD   650 mg at 05/29/24 0331    albuterol (PROVENTIL HFA/VENTOLIN HFA) inhaler  2 puff Inhalation Q6H PRN Wilner Parker MD   2 puff at 05/25/24 0050    allopurinol (ZYLOPRIM) tablet 300 mg  300 mg Oral QPM Nallely Barber MD   300 mg at 05/28/24 2021    alum & mag hydroxide-simethicone (MAALOX) suspension 30 mL  30 mL Oral Q4H PRN Wilner Parker MD        " ascorbic acid tablet 1,000 mg  1,000 mg Oral Daily Wilner Parker MD   1,000 mg at 05/28/24 0846    benzonatate (TESSALON) capsule 200 mg  200 mg Oral TID PRN Wilner Parker MD   200 mg at 05/21/24 2300    cholecalciferol (VITAMIN D3) capsule 250 mcg  250 mcg Oral Weekly Wilner Parker MD   250 mcg at 05/22/24 0849    cyanocobalamin (VITAMIN B-12) sublingual tablet 500 mcg  500 mcg Sublingual Daily Wilner Parker MD   500 mcg at 05/28/24 0845    ethacrynic acid (EDECRIN) half-tab 12.5 mg  12.5 mg Oral Daily Wilner Parker MD   12.5 mg at 05/28/24 0846    fluticasone-vilanterol (BREO ELLIPTA) 100-25 MCG/ACT inhaler 1 puff  1 puff Inhalation Daily Wilner Parker MD   1 puff at 05/28/24 0846    And    umeclidinium (INCRUSE ELLIPTA) 62.5 MCG/ACT inhaler 1 puff  1 puff Inhalation Daily Wilner Parker MD   1 puff at 05/28/24 0846    gabapentin (NEURONTIN) capsule 100 mg  100 mg Oral Q6H PRN Wilner Parker MD   100 mg at 05/27/24 0900    [Held by provider] gabapentin (NEURONTIN) capsule 400 mg  400 mg Oral At Bedtime Tejinder Correa MD   400 mg at 05/27/24 2215    guaiFENesin (MUCINEX) 12 hr tablet 1,200 mg  1,200 mg Oral BID PRN Wilner Parker MD        HOLD MEDICATION   Does not apply HOLD Alvina Garg DO        Hold Medications for ECT (select and list medications to be held)   Does not apply HOLD Tejinder Correa MD        Hold Medications for ECT (select and list medications to be held)   Does not apply HOLD Boo Riley MD        ibuprofen (ADVIL/MOTRIN) tablet 400 mg  400 mg Oral Q6H PRN Alvina Garg DO        Lidocaine (LIDOCARE) 4 % Patch 1 patch  1 patch Transdermal Q24H Lulu Zacarias MD   1 patch at 05/26/24 0924    magnesium oxide (MAG-OX) tablet 400 mg  400 mg Oral BID Anna Brewer PA-C   400 mg at 05/28/24 2022    melatonin tablet 3 mg  3 mg Oral At Bedtime PRN Wilner Parker MD   3 mg at 05/28/24 0302    menthol (Topical Analgesic) 2.5%  (BENGAY VANISHING SCENT) 2.5 % topical gel   Topical Q6H PRN Anna Brewer PA-C   Given at 05/27/24 2018    naloxone (NARCAN) injection 0.2 mg  0.2 mg Intravenous Q2 Min PRN Wilner Parker MD        Or    naloxone (NARCAN) injection 0.4 mg  0.4 mg Intravenous Q2 Min PRN Wilner Parker MD        Or    naloxone (NARCAN) injection 0.2 mg  0.2 mg Intramuscular Q2 Min PRN Wilner Parker MD        Or    naloxone (NARCAN) injection 0.4 mg  0.4 mg Intramuscular Q2 Min PRN Wilner Parker MD        OLANZapine (zyPREXA) tablet 2.5 mg  2.5 mg Oral TID PRN Wilner Parker MD        Or    OLANZapine (zyPREXA) injection 2.5 mg  2.5 mg Intramuscular TID PRN Wilner Parker MD        ondansetron (ZOFRAN ODT) ODT tab 4 mg  4 mg Oral Q6H PRN Wilner Parker MD        oxyCODONE (ROXICODONE) tablet 5 mg  5 mg Oral Q6H Nallely Barber MD   5 mg at 05/29/24 0610    And    oxyCODONE (ROXICODONE) tablet 5 mg  5 mg Oral Daily PRN Nallely Barber MD   5 mg at 05/26/24 0255    pantoprazole (PROTONIX) EC tablet 40 mg  40 mg Oral Daily Wilner Parker MD   40 mg at 05/28/24 0846    polyethylene glycol (MIRALAX) Packet 17 g  17 g Oral Daily PRN Wilner Parker MD        potassium chloride jeannette ER (KLOR-CON M20) CR tablet 20 mEq  20 mEq Oral Daily Wilner Parker MD   20 mEq at 05/28/24 0845    rOPINIRole (REQUIP) tablet 2 mg  2 mg Oral TID Nallely Barber MD   2 mg at 05/28/24 2242    sodium chloride (OCEAN) 0.65 % nasal spray 1 spray  1 spray Both Nostrils Q1H PRN Anna Brewer PA-C        SYNTHROID (Brand only - 75 mcg strength tablets)  150 mcg Oral Once per day on Monday Tuesday Wednesday Thursday Friday Saturday Tejinder Correa MD   150 mcg at 05/29/24 0610    And    SYNTHROID tablet 75 mcg (brand only)  75 mcg Oral Every Sunday Tejinder Correa MD   75 mcg at 05/26/24 0620    zolpidem (AMBIEN) tablet 5 mg  5 mg Oral At Bedtime PRN Wilner Parker MD   5 mg at 05/24/24 0056        PRN:  Current Facility-Administered Medications   Medication Dose Route Frequency Provider Last Rate Last Admin    acetaminophen (TYLENOL) tablet 650 mg  650 mg Oral Q4H PRN Wilner Parker MD   650 mg at 05/29/24 0331    albuterol (PROVENTIL HFA/VENTOLIN HFA) inhaler  2 puff Inhalation Q6H PRN Wilner Parker MD   2 puff at 05/25/24 0050    allopurinol (ZYLOPRIM) tablet 300 mg  300 mg Oral QPM Nallely Barber MD   300 mg at 05/28/24 2021    alum & mag hydroxide-simethicone (MAALOX) suspension 30 mL  30 mL Oral Q4H PRN Wilner Parker MD        ascorbic acid tablet 1,000 mg  1,000 mg Oral Daily Wilner Parker MD   1,000 mg at 05/28/24 0846    benzonatate (TESSALON) capsule 200 mg  200 mg Oral TID PRN Wilner Parekr MD   200 mg at 05/21/24 2300    cholecalciferol (VITAMIN D3) capsule 250 mcg  250 mcg Oral Weekly Wilner Parker MD   250 mcg at 05/22/24 0849    cyanocobalamin (VITAMIN B-12) sublingual tablet 500 mcg  500 mcg Sublingual Daily Wilner Parker MD   500 mcg at 05/28/24 0845    ethacrynic acid (EDECRIN) half-tab 12.5 mg  12.5 mg Oral Daily Wilner Parker MD   12.5 mg at 05/28/24 0846    fluticasone-vilanterol (BREO ELLIPTA) 100-25 MCG/ACT inhaler 1 puff  1 puff Inhalation Daily Wilner Parker MD   1 puff at 05/28/24 0846    And    umeclidinium (INCRUSE ELLIPTA) 62.5 MCG/ACT inhaler 1 puff  1 puff Inhalation Daily Wilner Parker MD   1 puff at 05/28/24 0846    gabapentin (NEURONTIN) capsule 100 mg  100 mg Oral Q6H PRN Wilner Parker MD   100 mg at 05/27/24 0900    [Held by provider] gabapentin (NEURONTIN) capsule 400 mg  400 mg Oral At Bedtime Tejinder Correa MD   400 mg at 05/27/24 7918    guaiFENesin (MUCINEX) 12 hr tablet 1,200 mg  1,200 mg Oral BID PRN Wilner Parker MD        HOLD MEDICATION   Does not apply HOLD lAvina Garg DO        Hold Medications for ECT (select and list medications to be held)   Does not apply HOLD Tejinder Correa MD         Hold Medications for ECT (select and list medications to be held)   Does not apply HOLD Boo Riley MD        ibuprofen (ADVIL/MOTRIN) tablet 400 mg  400 mg Oral Q6H PRN Alvina Garg DO        Lidocaine (LIDOCARE) 4 % Patch 1 patch  1 patch Transdermal Q24H Lulu Zacarias MD   1 patch at 05/26/24 0924    magnesium oxide (MAG-OX) tablet 400 mg  400 mg Oral BID Anna Brewer PA-C   400 mg at 05/28/24 2022    melatonin tablet 3 mg  3 mg Oral At Bedtime PRN Wilner Parker MD   3 mg at 05/28/24 2242    menthol (Topical Analgesic) 2.5% (BENGAY VANISHING SCENT) 2.5 % topical gel   Topical Q6H PRN Anna Brewer PA-C   Given at 05/27/24 2018    naloxone (NARCAN) injection 0.2 mg  0.2 mg Intravenous Q2 Min PRN Wilner Parker MD        Or    naloxone (NARCAN) injection 0.4 mg  0.4 mg Intravenous Q2 Min PRN Wilner Parker MD        Or    naloxone (NARCAN) injection 0.2 mg  0.2 mg Intramuscular Q2 Min PRN Wilner Parker MD        Or    naloxone (NARCAN) injection 0.4 mg  0.4 mg Intramuscular Q2 Min PRN Wilner Parker MD        OLANZapine (zyPREXA) tablet 2.5 mg  2.5 mg Oral TID PRN Wilner Parker MD        Or    OLANZapine (zyPREXA) injection 2.5 mg  2.5 mg Intramuscular TID PRN Wilner Parker MD        ondansetron (ZOFRAN ODT) ODT tab 4 mg  4 mg Oral Q6H PRN Wilner Parker MD        oxyCODONE (ROXICODONE) tablet 5 mg  5 mg Oral Q6H Nallely Barber MD   5 mg at 05/29/24 0610    And    oxyCODONE (ROXICODONE) tablet 5 mg  5 mg Oral Daily PRN Nallely Barber MD   5 mg at 05/26/24 0255    pantoprazole (PROTONIX) EC tablet 40 mg  40 mg Oral Daily Wilner Parker MD   40 mg at 05/28/24 0846    polyethylene glycol (MIRALAX) Packet 17 g  17 g Oral Daily PRN Wilner Parker MD        potassium chloride jeannette ER (KLOR-CON M20) CR tablet 20 mEq  20 mEq Oral Daily Wilner Parker MD   20 mEq at 05/28/24 0845    rOPINIRole (REQUIP) tablet 2 mg  2 mg Oral TID Sunil,  MD Nallely   2 mg at 05/28/24 2242    sodium chloride (OCEAN) 0.65 % nasal spray 1 spray  1 spray Both Nostrils Q1H PRN Anna Brewer PA-C        SYNTHROID (Brand only - 75 mcg strength tablets)  150 mcg Oral Once per day on Monday Tuesday Wednesday Thursday Friday Saturday Tejinder Correa MD   150 mcg at 05/29/24 0610    And    SYNTHROID tablet 75 mcg (brand only)  75 mcg Oral Every Sunday Tejinder Correa MD   75 mcg at 05/26/24 0620    zolpidem (AMBIEN) tablet 5 mg  5 mg Oral At Bedtime PRN Wilner Parker MD   5 mg at 05/24/24 0056       Labs and Imaging:  New results:   No results found for this or any previous visit (from the past 24 hour(s)).      Data this admission:  - CBC elevated Hgb 17.7  - CMP unremarkable  - TSH normal  - UDS THC positive  - Hgb A1c normal  - Lipids LDL mildly elevated 103 but otherwise unremarkable  - Vitamin B12 unremarkable  - Folate unremarkable  - Vitamin D unremarkable  - Urinalysis unremarkable  - EKG normal sinus rhythm, RBBB QTc 458    Potential Drug Interactions:   Per [employer], combinations of the following drugs has a rating of D, demonstrating that the medications may interact with each other in a clinically significant manner and a patient-specific assessment was made and determined that the benefits outweighed the risks.   - Gabapentin, oxycodone, and Magnesium oxide The following risks include CNS depression.   - Gabapentin and levothyroxine. The following risks include magnesium salts decreasing serum concentration of levothyroxine.     Per [employer], combinations of the following drugs has a rating of C demonstrating that agents may interact in a clinically significant manner but the benefits of the combination of medications often outweigh the risk.   - Ropinirole, oxycodone, and gabapentin. The following risks include CNS depression.   - allopurinol and ethacrynic acid. The following risks include ethacrynic acid increasing concentration of allopurinol.  -  "ethacrynic acid and oxycodone.  The following risks include oxycodone may enhanced the toxic effects of ethacrynic acid  Treatment team will monitor  for potential side effects and re-evaluate as needed.           Mental Status Exam:     Oriented to:  Grossly Oriented  General:  Awake and Alert  Appearance:  appears stated age and Grooming is adequate  Behavior/Attitude:  cooperative and open  Eye Contact:  appropriate  Psychomotor: normal and no evidence of tics, dystonia, or tardive dyskinesia no catatonia present  Speech:  appropriate volume/tone and talkative  Language: Fluent in English with appropriate syntax and vocabulary.  Mood:  \"a little anxious\"  Affect:  congruent with mood, labile, tearful, and anxious  Thought Process:  coherent, goal directed, and circumstantial  Thought Content:  suicidal ideation (passive), No HI, No VH, and No AH; No apparent delusions  Associations:  intact  Insight:  fair due to recognizing the circumstances of her decompensation.  Judgment:  fair due to willingness to engage in ECT and better her MH  Impulse control: fair  Attention Span:  grossly intact  Concentration:  grossly intact  Recent and Remote Memory:  not formally assessed  Fund of Knowledge:  average  Muscle Strength and Tone: normal  Gait and Station: Normal and notes difficulty with gait     Psychiatric Assessment     Randi Cleary is a 68 year old female previously diagnosed with MDD, recurrent severe, substance induced mood disorder, Unspecified Trauma, CHAVO, borderline personality disorder and alcohol use disorder who presented voluntarily with SI in the context of treatment resistant MDD. On admission she presented with significant symptoms of depression incuding emotional lability, low mood, hopelessness, amotivation, anxiety, anhedonia, low energy, insomnia low appetite, excess guilt and poor concentration. Her affect was dysphroic anjd axious at times with heavy perseveration on medico-surgical " dignosises and passive SI causing incresed anxiety. She notes she has had a plan but no intent. Her history of extensive substance use, medical history and chronic pain contribute biologically. Her presentation is further complicated by borderline personality disorder and history of trauma. Additionally, she has a very limited support system, interpersonal conflicts with friends and her spouse. However her last hospitalization was in the 1980's for a suicide attempt via prozac overdose and has been engaged in treatment and present voluntarily. Her presentation is consistent with decompensated Major Depressive disorder, recurrent severe. Her disorder has been treatment resistant including to TMS and at this time, ECT would be the best course of action to address her symptom severity. Patient warrants inpatient psychiatric hospitalization to maintain her safety.      Psychiatric Plan by Diagnosis      Today's changes:  - patient care order placed for letting patient use marker for journaling and having a lotion bottle in her room      # MDD, recurrent, severe, with anxious distress  - Patient suffers from treatment resistant depression and has trialed many medications. At this point treatment team will need to do a chart review to review medication trials and determine the best medication to address depressive symptoms long term  - Pt will start ECT 5/24 : Hold high-dose gabapentin/pregabalin after 6pm on the day before ECT (to permit adequate seizure)   - Next ECT date: 5/29  - ECT sessions completed: 1     #Insomnia  - Zolpidem 5mg qpm prn    Pertinent Labs/Monitoring:   - EKG 5/22 - Qtc 458 NSR, RBBB     Additional Plans:  - Patient will be treated in therapeutic milieu with appropriate individual and group therapies as described     Psychiatric Hospital Course:      Randi Cleary was admitted to [facility] as a voluntary patient. ECT and Medicine consult was placed in order to get the patient cleared for an  acute series of ECT. Plan for a Right Unilateral ultra-brief pulse given the patient's age and concern for memory and cognitive deficits.     The risks, benefits, alternatives, and side effects were discussed and understood by the patient     Medical Assessment and Plan     Medical diagnoses to be addressed this admission:    # Hypothyroidism  - PTA 150mcg M-Sa, 75mcg on Perez     # KYAW   - Doesn't use CPAP at home, sleep study on 06/2024      # RLS  - Continue PTA Ropinirole 2 mg PO TID  - Gabapentin 400mg qpm  - Magnesium Citrated changed to Magnesium Oxide 400mg BID (per patient and medicine)    #RBBB:  Previously on other EKGs    #Cervical radiculopathy and myelopathy s/p cervical laminoplasty 12/2021    # Chronic Pain  - Continue PTA Roxicodone 5 mg q6h + 5 mg PRN (for max daily dose of 25 mg)  - Menthol 2.5% topical gel q6hrs prn  - TENS Unit     #Dizziness  #Headache  Reports hx of migraines, no vision changes or hearing changes. notes this has been ongoing since she had her spinal surgery.   - CT head 5/22 unremarkable    #Gout  - Nutrition consult  - Allopurinol 300mg qpm  - Flares in left podagra but none currently    #Vitamin B12 Deficiency  - Vit B12 500mcg qday    #Vitamin C Deficiency  - Ascorbic Acid 1000mg    #Vitamin D Deficiency  - 250mcg weekly    #COPD  - Breo Ellipta 1 puff daily   - Incruse Ellipta 1 puff daily  - Tessalon cap 200mg TID prn  - Albuterol 2 puffs q6hr prn  - Mucinex 1200mg BID prn     #GERD  #Hiatal Hernia  - Protonix 40mg daily    #Possible pulmonary HTN  #HTN  Per Medicine note, pt follows with Cardiology here and has scheduled right heart catheterization for 06/12/2024. Has follow up with Cardiology in clinic scheduled for 06/19/2024. PTA edecrin 12.5mg daily and potassium supplement. Electrolytes and renal function stable.Recently lost 50lbs and BP has improved.   - Ethacrynic Acid 12.5mg qday   -Hold Edecrin on date of ECT, continue 12.5mg daily for now  -Continue potassium  supplement   - Klorcon 20meq daily     #Hemochromatosis, hereditary  - Hgb Stable at ~17    #Hepatic Steatosis  - Stable  - LFTs wnl, no abdominal pain, distention, N/V    #History of breast cancer s/p mastectomy, chemo and XRT:   - currently in remission      #Alcohol use disorder, in remission:   - No current use. Two years sober     #Hx of pheochromocytoma s/p left adrenalectomy 08/2017:   - Stable  - Follows Endocrinology for this.     #Psoriasis:  - Noted on R hang  - Uses scalp brush    Medical course: Patient was physically examined by the ED prior to being transferred to the unit and was found to be medically stable and appropriate for admission. Medicine consult was done to provide clearance for ECT. Due to patient's persistent neck pain after surgery in 2021, CT Head was done which was negative. Oxycodone changed from q6hr prn to scheduled with an extra 5mg in the day as needed per PTA regimen. Patient continued to express some nerve pain so gabapentin was increased to 400mg.      Consults:  none    Medical course: Patient was physically examined by the ED prior to being transferred to the unit and was found to be medically stable and appropriate for admission.     Consults: Medicine for ECT Clearance, ECT Consult, Nutrition     Checklist     Legal Status: Voluntary     Safety Assessment:   Behavioral Orders   Procedures    Code 1 - Restrict to Unit    Code 2 - 1:1 Staff Supervision     For coming down to ECT only    Discontinue 1:1 attendant for suicide risk     Order Specific Question:   I have performed an in person assessment of the patient     Answer:   Based on this assessment the patient no longer requires a one on one attendant at this point in time.     Order Specific Question:   Rationale     Answer:   Patient States able to remain safe in hospital     Order Specific Question:   Rationale     Answer:   Modifications to care environment made to mitigate safety risk     Order Specific Question:    Rationale     Answer:   Routine observations are sufficient to monitor safety.    Electroconvulsive therapy     Series of up to 12 treatments. Begin Date: 5/24/24     Treating Psychiatrist providing ECT:  Ruthann     Notified on:  5/21/24    Electroconvulsive therapy     Series of up to 12 treatments. Begin Date: 5/24/24     Treating Psychiatrist providing ECT:  Ruthann     Notified on:  5/21/24    Fall precautions    Routine Programming     As clinically indicated    Status 15     Every 15 minutes.    Suicide precautions: Suicide Risk: MODERATE; Clinical rationale to override score: lack of access to a plan for self-harm     Order Specific Question:   Suicide Risk     Answer:   MODERATE     Order Specific Question:   Clinical rationale to override score:     Answer:   lack of access to a plan for self-harm       Risk Assessment:  Risk for harm is moderate-high.  Risk factors: SI, maladaptive coping, trauma, and past behaviors  Protective factors: engaged in treatment     SIO: Discontinued    Disposition: Pending stabilization, medication optimization, & development of a safe discharge plan.     Attestations   Reina Vallecillo, MS3     I was present with the medical student who contributed to the documentation as above. I agree with above assessment and plan. Some edits were made by me as necessary. Patient was seen and discussed with attending Dr. Choudhary.      Jamila Jason MD  PGY-2 Psychiatry Resident   [facility]       Alvina Garg D.O.  Psychiatry Resident  [facility]     Attestation:  This patient has been seen and evaluated by me, Jairo Choudahry MD.  I have discussed this patient with the house staff team including the resident and medical student and I agree with the findings and plan in this note.    I have reviewed today's vital signs, medications, labs and imaging. Jairo Choudhary MD , PhD.

## 2024-05-29 NOTE — PROGRESS NOTES
Patients VSS, A/O, IV removed, meets phase 2 criteria and is able to move to [facility] at this time. Report given to CHRISTINA Wyatt. Pt transported by staff.

## 2024-05-29 NOTE — PROGRESS NOTES
Pt arrived back from ECT at about 12:30 PM. Denies any nausea or dizziness. Pt was alert and oriented x 2 . She initially cannot remember where her room was, but able to recall after a few minutes.  She complained of neck pain. PRN Tylenol offered/administered. She was  a little tearful when she came (feeling embarrassed that she is tearful). Vitals as follows : /93. P 85. O2 sat is 96%. Pt was offered her lunch but she was not hungry yet. She was able to manage her coffee from breakfast.

## 2024-05-29 NOTE — ANESTHESIA PREPROCEDURE EVALUATION
Anesthesia Pre-Procedure Evaluation    Patient: Randi Cleary   MRN: [id number] : 1955        Procedure : * No procedures listed *          Past Medical History:   Diagnosis Date    Bipolar 2 disorder (H)     Breast cancer (H)     lumpectomy, radiation, chemo    Chronic pain syndrome     COPD (chronic obstructive pulmonary disease) (H)     asthma    Cord compression (H) 2021    Dizzy     Drug tolerance     opioid    Esophageal reflux     Fatigue     Generalized anxiety disorder     Graves disease     Hemochromatosis 2018    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; H63D not detected    History of breast cancer 2020    Formatting of this note might be different from the original. Created by Conversion  Replacement Utility updated for latest IMO load Formatting of this note might be different from the original. Created by Conversion  Replacement Utility updated for latest IMO load    History of corticosteroid therapy 2019    History of partial adrenalectomy (H24) 2019    History of pheochromocytoma 2019    Hx antineoplastic chemotherapy     Hx of radiation therapy     Hyperlipidaemia     Hypertension     Impaired fasting glucose     Injury of neck, whiplash 07/15/2021    Joint pain     KYAW (obstructive sleep apnea) 2016    Osteopenia     Pheochromocytoma, left 2017    laparoscopically removed    Postablative hypothyroidism 1995    Prediabetes 10/03/2019    by A1c    Psoriasis     Psoriatic arthropathy (H)     Right rotator cuff tear     RLS (restless legs syndrome)     on ropinorole    Sacroiliitis (H24)     Serotonin syndrome 2020    [facility] - While on desvenlafaxine 100mg    Snoring     Spinal stenosis     Status post coronary angiogram 10/03/2019    Urinary incontinence     Vitamin B 12 deficiency 2009    Vitamin D deficiency 2010      Past Surgical History:   Procedure Laterality Date    ARTHRODESIS ANKLE      ARTHROPLASTY ANKLE Right 2015     Procedure: ARTHROPLASTY ANKLE;  Surgeon: Jason Coughlin MD;  Location: [facility]    ARTHROPLASTY REVISION ANKLE Right 6/29/2015    Procedure: ARTHROPLASTY REVISION ANKLE;  Surgeon: Jason Coughlin MD;  Location: [facility]    BIOPSY BREAST      BREAST BIOPSY, CORE RT/LT      COLONOSCOPY      COLONOSCOPY N/A 2/25/2021    Procedure: COLONOSCOPY;  Surgeon: Guru Elke Tolbert MD;  Location:  GI    CV CORONARY ANGIOGRAM N/A 10/3/2019    Procedure: CV CORONARY ANGIOGRAM;  Surgeon: Bryce Pierre MD;  Location:  HEART CARDIAC CATH LAB    CV RIGHT HEART CATH MEASUREMENTS RECORDED N/A 10/3/2019    Procedure: CV RIGHT HEART CATH;  Surgeon: Bryce Pierre MD;  Location:  HEART CARDIAC CATH LAB    ESOPHAGOSCOPY, GASTROSCOPY, DUODENOSCOPY (EGD), COMBINED N/A 2/25/2021    Procedure: ESOPHAGOGASTRODUODENOSCOPY, WITH BIOPSY;  Surgeon: Guru Elke Tolbert MD;  Location:  GI    EYE SURGERY  2021    HC REMOVE TONSILS/ADENOIDS,<11 Y/O      Description: Tonsillectomy With Adenoidectomy;  Recorded: 04/07/2010;    IR LUMBAR EPIDURAL STEROID INJECTION  10/26/2004    IR LUMBAR EPIDURAL STEROID INJECTION  11/16/2004    IR LUMBAR EPIDURAL STEROID INJECTION  12/21/2004    IR LUMBAR EPIDURAL STEROID INJECTION  6/8/2006    JOINT REPLACEMENT      LAMINOPLASTY CERVICAL POSTERIOR THREE+ LEVELS Left 12/21/2021    Procedure: CERVICAL 3-CERVICAL 6 LEFT OPEN DOOR LAMINOPLASTY AND LEFT CERVICAL 4-5 AND CERVICAL 6-7 POSTERIOR FORAMINOTOMY;  Surgeon: Angela Gregory MD;  Location: [facility]    LAPAROSCOPIC ADRENALECTOMY Left 08/02/2017    pheochromocytoma    LAPAROSCOPIC ADRENALECTOMY Left 8/2/2017    Procedure: LAPAROSCOPIC LEFT ADRENALECTOMY, ;  Surgeon: Gab Linares MD;  Location: [facility];  Service:     LENGTHEN TENDON ACHILLES Right 6/29/2015    Procedure: LENGTHEN TENDON ACHILLES;  Surgeon: Jason Coughlin MD;  Location: [facility]    LUMPECTOMY BREAST       "LUMPECTOMY BREAST Left 1994    MAMMOPLASTY REDUCTION Right 2015    [facility]    MAMMOPLASTY REDUCTION Right     approx late /    MASTECTOMY      left lumpectomy with axillary node dissection    MASTECTOMY MODIFIED RADICAL      OTHER SURGICAL HISTORY Right     reconstructive breast surgery    OTHER SURGICAL HISTORY      Adrenalectomy for pheochromocytoma    IA MASTECTOMY, MODIFIED RADICAL      Description: Modified Radical Mastectomy Left Breast;  Recorded: 2010;    REPAIR HAMMER TOE Right 2015    Procedure: REPAIR HAMMER TOE;  Surgeon: Jason Coughlin MD;  Location: [facility]    TONSILLECTOMY      TONSILLECTOMY & ADENOIDECTOMY      [facility] ARTHRODESIS,ANKLE,OPEN Right     Description: Ankle Arthrodesis;  Recorded: 2010;      Allergies   Allergen Reactions    Serotonin Reuptake Inhibitors (Ssris) Anxiety, Difficulty breathing, Headache, Palpitations and Shortness Of Breath    Buspirone      The patient states she had serotonin syndrome    Cephalexin      Other reaction(s): unknown rxn.    Desvenlafaxine      Serotonin syndrome    Diclofenac Sodium [Diclofenac]      Serotonin syndrome and restless legs syndrome    Gabapentin      Drove on the wrong side of the highway    Levofloxacin      \"CAN'T REMEMBER\"    Penicillins      \"SORES IN MOUTH\"    Riluzole Difficulty breathing and Swelling    Sulfa Antibiotics      \"PT DOES NOT KNOW WHAT THE REACTION WAS\"    Topiramate Other (See Comments)     Frequent urination      Social History     Tobacco Use    Smoking status: Former     Current packs/day: 0.00     Average packs/day: 2.5 packs/day for 29.2 years (72.9 ttl pk-yrs)     Types: Cigarettes     Start date: 1971     Quit date: 2000     Years since quittin.9     Passive exposure: Past    Smokeless tobacco: Never   Substance Use Topics    Alcohol use: Not Currently     Comment: relapse 2021 sober       Wt Readings from Last 1 Encounters:   24 88.4 kg (194 lb 14.4 oz)    "     Anesthesia Evaluation   Pt has had prior anesthetic.     No history of anesthetic complications       ROS/MED HX  ENT/Pulmonary:     (+) sleep apnea, uses CPAP,                        COPD,              Neurologic:       Cardiovascular: Comment: Pulmonary HTN.  Followed by Cardiology.  Next right heart cath scheduled for 6/19/2024.    (+)  hypertension- -   -  - -                                 Previous cardiac testing   Echo: Date: Results:    Stress Test:  Date: Results:    ECG Reviewed:  Date: 5/21/2024 Results:  RBBB, sinus rhythm  Cath:  Date: Results:      METS/Exercise Tolerance:     Hematologic:       Musculoskeletal: Comment: S/P Cervical Surgery      GI/Hepatic: Comment: Hepatic steatosis    (+) GERD,                   Renal/Genitourinary:       Endo: Comment: Graves Disease. S/P Radioiodine Tx.  Hx of pheochromocytoma, S/P Left Adrenalectomy 8/2017    (+)          thyroid problem,     Obesity,       Psychiatric/Substance Use: Comment: MDD, recurrent, severe, with anxious distress      Infectious Disease:       Malignancy:   (+) Malignancy, History of Breast.Breast CA Remission status post Surgery and Chemo.      Other:            Physical Exam    Airway  airway exam normal      Mallampati: II   TM distance: > 3 FB   Neck ROM: full   Mouth opening: > 3 cm    Respiratory Devices and Support         Dental       (+) Minor Abnormalities - some fillings, tiny chips      Cardiovascular   cardiovascular exam normal          Pulmonary   pulmonary exam normal            Other findings: S/P Cervical Surgery, good ROM  OUTSIDE LABS:  CBC:   Lab Results   Component Value Date    WBC 7.1 05/22/2024    WBC 7.9 05/21/2024    HGB 17.7 (H) 05/22/2024    HGB 17.7 (H) 05/21/2024    HCT 49.9 (H) 05/22/2024    HCT 49.6 (H) 05/21/2024     05/22/2024     05/21/2024     BMP:   Lab Results   Component Value Date     05/22/2024     05/21/2024    POTASSIUM 3.8 05/22/2024    POTASSIUM 3.8 05/21/2024     CHLORIDE 104 05/22/2024    CHLORIDE 104 05/21/2024    CO2 24 05/22/2024    CO2 24 05/21/2024    BUN 9.3 05/22/2024    BUN 11.2 05/21/2024    CR 0.57 05/22/2024    CR 0.57 05/21/2024     (H) 05/22/2024    GLC 86 05/21/2024     COAGS:   Lab Results   Component Value Date    PTT 34 12/13/2021    INR 0.94 12/13/2021     POC:   Lab Results   Component Value Date     (H) 02/25/2021     HEPATIC:   Lab Results   Component Value Date    ALBUMIN 4.1 05/22/2024    PROTTOTAL 7.2 05/22/2024    ALT 23 05/22/2024    AST 27 05/22/2024    ALKPHOS 86 05/22/2024    BILITOTAL 0.6 05/22/2024     OTHER:   Lab Results   Component Value Date    A1C 5.6 05/22/2024    LINDA 9.6 05/22/2024    MAG 1.9 05/22/2024    TSH 0.58 05/21/2024    T4 1.49 03/29/2024    T3 114 01/18/2023    CRP <2.9 11/16/2021    SED 8 10/17/2023       Anesthesia Plan    ASA Status:  3    NPO Status:  NPO Appropriate    Anesthesia Type: General.     - Airway: Mask Only   Induction: Intravenous.   Maintenance: N/A.        Consents    Anesthesia Plan(s) and associated risks, benefits, and realistic alternatives discussed. Questions answered and patient/representative(s) expressed understanding.     - Discussed: Risks, Benefits and Alternatives for BOTH SEDATION and the PROCEDURE were discussed     - Discussed with:  Patient      - Extended Intubation/Ventilatory Support Discussed: No.      - Patient is DNR/DNI Status: No     Use of blood products discussed: No .     Postoperative Care            Comments:               April Luciano MD    I have reviewed the pertinent notes and labs in the chart from the past 30 days and (re)examined the patient.  Any updates or changes from those notes are reflected in this note.

## 2024-05-29 NOTE — PLAN OF CARE
BEH IP Unit Acuity Rating Score (UARS)  Patient is given one point for every criteria they meet.     CRITERIA SCORING   On a 72 hour hold, court hold, committed, stay of commitment, or revocation. 0    Patient LOS on BEH unit exceeds 20 days. 0  LOS: 8   Patient under guardianship, 55+, otherwise medically complex, or under age 11. 1   Suicide ideation without relief of precipitating factors. 1   Current plan for suicide. 0   Current plan for homicide. 0   Imminent risk or actual attempt to seriously harm another without relief of factors precipitating the attempt. 0   Severe dysfunction in daily living (ex: complete neglect for self care, extreme disruption in vegetative function, extreme deterioration in social interactions). 1   Recent (last 7 days) or current physical aggression in the ED or on unit. 0   Restraints or seclusion episode in past 72 hours. 0   Recent (last 7 days) or current verbal aggression, agitation, yelling, etc., while in the ED or unit. 0   Active psychosis. 0   Need for constant or near constant redirection (from leaving, from others, etc).  0   Intrusive or disruptive behaviors. 0   Patient requires 3 or more hours of individualized nursing care per 8-hour shift (i.e. for ADLs, meds, therapeutic interventions). 0   TOTAL 3

## 2024-05-29 NOTE — PLAN OF CARE
"  Pt presented with  a flat affect.  She endorsed anxiety at 5. Denies any depression, SI or hallucinations. Pt complained of neck pain in the morning , PRN Tylenol was given. Pt had ECT today and when she came back, she complained of head ache. PRN Tylenol was administered and pain was alleviated. PRN gabapentin was also requested. Pt  was initially confused when she got back from ECT, She was overwhelmed and was tearful but after a few minutes later, pt was able to recall and was happy about it. Pt is interacting with peers. She walks about the halls holding on the grab bars at times. No behavioral concerns this shift.   Problem: Sleep Disturbance  Goal: Adequate Sleep/Rest  Outcome: Not Progressing     Problem: Adult Inpatient Plan of Care  Goal: Patient-Specific Goal (Individualized)  Description: You can add care plan individualizations to a care plan. Examples of Individualization might be:  \"Parent requests to be called daily at 9am for status\", \"I have a hard time hearing out of my right ear\", or \"Do not touch me to wake me up as it startles  me\".  Outcome: Progressing  Goal: Absence of Hospital-Acquired Illness or Injury  Outcome: Progressing     Problem: Adult Behavioral Health Plan of Care  Goal: Adheres to Safety Considerations for Self and Others  Outcome: Progressing  Intervention: Develop and Maintain Individualized Safety Plan  Recent Flowsheet Documentation  Taken 5/29/2024 1326 by Yoselin Garcia RN  Safety Measures: environmental rounds completed  Goal: Absence of New-Onset Illness or Injury  Outcome: Progressing  Intervention: Identify and Manage Fall Risk  Recent Flowsheet Documentation  Taken 5/29/2024 1326 by Yoselin Garcia RN  Safety Measures: environmental rounds completed     Problem: Suicide Risk  Goal: Absence of Self-Harm  Outcome: Progressing  Intervention: Assess Risk to Self and Maintain Safety  Recent Flowsheet Documentation  Taken 5/29/2024 1326 by Yoselin Garcia" Adwoa GOODWIN RN  Self-Harm Prevention: environmental self-harm risks assessed  Enhanced Safety Measures: pain management  Intervention: Promote Psychosocial Wellbeing  Recent Flowsheet Documentation  Taken 5/29/2024 1326 by Yoselin Garcia RN  Supportive Measures:   active listening utilized   counseling provided   relaxation techniques promoted  Family/Support System Care:   involvement promoted   presence promoted   self-care encouraged   support provided     Problem: Depression  Goal: Improved Mood  Outcome: Progressing  Intervention: Monitor and Manage Depressive Symptoms  Recent Flowsheet Documentation  Taken 5/29/2024 1326 by Yoselin Garcia RN  Supportive Measures:   active listening utilized   counseling provided   relaxation techniques promoted  Family/Support System Care:   involvement promoted   presence promoted   self-care encouraged   support provided     Problem: Fall Injury Risk  Goal: Absence of Fall and Fall-Related Injury  Outcome: Progressing   Goal Outcome Evaluation:    Plan of Care Reviewed With: patient

## 2024-05-29 NOTE — PLAN OF CARE
05/29/24 0921   Individualization/Patient Specific Goals   Patient Personal Strengths appropriate judgment/decision-making;expressive of emotions;expressive of needs;independent living skills;intellectual cognitive skills;interests/hobbies;motivated for recovery;medication/treatment adherence;positive vocational history;resilient;resourceful;socioeconomic stability;stable living environment   Patient Vulnerabilities family/relationship conflict;history of unsuccessful treatment;poor impulse control;traumatic event   Anxieties, Fears or Concerns None   Individualized Care Needs Chronic pain   Interprofessional Rounds   Summary 1. Stabilization of mood disorder symptoms 2. Safe with self 3. Medication mgmt per MD's 4. Medically cleared for ECT 5.Coordination of care with outpatient provders 6. Psych f/u care in place   Participants CTC;patient;psychiatrist;nursing   Behavioral Team Discussion   Participants Radha Escamilla  RN, Lorena Victoria MA,LP, Med students   Progress Patient has undergone ECT x2 thus far. She continues to have many somatic complaints. Mood may be slightly improved. Patient has been social /engaging on the unit.   Anticipated length of stay 14 days   Continued Stay Criteria/Rationale Severity of depression - inability to care for self, initiation of ECT   Medical/Physical Chronic pain, S/P Breast cancer   Precautions Per unit protocol   Plan She will be seen by Psychiatry daily.  Meds will be reviewed/adjusted per MD's. ECT in progress.  Care will be coordinated with outpatient providers. Patient will return home when stable/safe.  CTC will continue to assess needs- ensure appropriate f/u care is in place   Rationale for change in precautions or plan No change in plan/precautions   Safety Plan Patient to complete   Anticipated Discharge Disposition home with family     Goal Outcome Evaluation:

## 2024-05-29 NOTE — ANESTHESIA CARE TRANSFER NOTE
Patient: Randi Cleary    Procedure: * No procedures listed *       Diagnosis: * No pre-op diagnosis entered *  Diagnosis Additional Information: No value filed.    Anesthesia Type:   General     Note:    Oropharynx: oropharynx clear of all foreign objects  Level of Consciousness: drowsy  Oxygen Supplementation: room air    Independent Airway: airway patency satisfactory and stable  Dentition: dentition unchanged  Vital Signs Stable: post-procedure vital signs reviewed and stable  Report to RN Given: handoff report given  Patient transferred to: PACU    Handoff Report: Identifed the Patient, Identified the Reponsible Provider, Reviewed the pertinent medical history, Discussed the surgical course, Reviewed Intra-OP anesthesia mangement and issues during anesthesia, Set expectations for post-procedure period and Allowed opportunity for questions and acknowledgement of understanding      Vitals:  Vitals Value Taken Time   BP     Temp     Pulse     Resp     SpO2         Electronically Signed By: April Luciano MD  May 29, 2024  11:42 AM

## 2024-05-29 NOTE — PLAN OF CARE
Problem: Sleep Disturbance  Goal: Adequate Sleep/Rest  Outcome: Progressing   Goal Outcome Evaluation:    Patient was awake intermittently tonight. Was observed to have slept for 5.25 hours. Pain managed with scheduled Oxycodone. NPO starting at midnight for ECT this morning at 1140 AM. Patient cites she's had increased L leg pain due to walking in the hallways and from not being able to use the cane, she is inquiring whether she can utilize a wheelchair- sticky note left for the providers to assess and advice.  Safety checks in place.

## 2024-05-30 ENCOUNTER — TELEPHONE (OUTPATIENT)
Dept: PSYCHIATRY | Facility: CLINIC | Age: 69
End: 2024-05-30
Payer: MEDICARE

## 2024-05-30 PROCEDURE — 250N000013 HC RX MED GY IP 250 OP 250 PS 637

## 2024-05-30 PROCEDURE — 124N000002 HC R&B MH UMMC

## 2024-05-30 PROCEDURE — 250N000013 HC RX MED GY IP 250 OP 250 PS 637: Performed by: PHYSICIAN ASSISTANT

## 2024-05-30 PROCEDURE — 99233 SBSQ HOSP IP/OBS HIGH 50: CPT | Performed by: STUDENT IN AN ORGANIZED HEALTH CARE EDUCATION/TRAINING PROGRAM

## 2024-05-30 PROCEDURE — 90834 PSYTX W PT 45 MINUTES: CPT

## 2024-05-30 RX ORDER — GUAIFENESIN 600 MG/1
600 TABLET, EXTENDED RELEASE ORAL 2 TIMES DAILY
Status: DISCONTINUED | OUTPATIENT
Start: 2024-05-30 | End: 2024-06-22 | Stop reason: HOSPADM

## 2024-05-30 RX ADMIN — POTASSIUM CHLORIDE 20 MEQ: 1500 TABLET, EXTENDED RELEASE ORAL at 08:35

## 2024-05-30 RX ADMIN — GUAIFENESIN 600 MG: 600 TABLET ORAL at 12:31

## 2024-05-30 RX ADMIN — OXYCODONE HYDROCHLORIDE 5 MG: 5 TABLET ORAL at 03:58

## 2024-05-30 RX ADMIN — Medication 500 MCG: at 08:34

## 2024-05-30 RX ADMIN — Medication 12.5 MG: at 08:35

## 2024-05-30 RX ADMIN — GUAIFENESIN 600 MG: 600 TABLET ORAL at 20:16

## 2024-05-30 RX ADMIN — ALLOPURINOL 300 MG: 300 TABLET ORAL at 20:16

## 2024-05-30 RX ADMIN — OXYCODONE HYDROCHLORIDE 5 MG: 5 TABLET ORAL at 12:23

## 2024-05-30 RX ADMIN — FLUTICASONE FUROATE AND VILANTEROL TRIFENATATE 1 PUFF: 100; 25 POWDER RESPIRATORY (INHALATION) at 08:35

## 2024-05-30 RX ADMIN — GABAPENTIN 100 MG: 100 CAPSULE ORAL at 14:46

## 2024-05-30 RX ADMIN — PANTOPRAZOLE SODIUM 40 MG: 40 TABLET, DELAYED RELEASE ORAL at 08:35

## 2024-05-30 RX ADMIN — UMECLIDINIUM 1 PUFF: 62.5 AEROSOL, POWDER ORAL at 08:35

## 2024-05-30 RX ADMIN — ROPINIROLE HYDROCHLORIDE 2 MG: 2 TABLET, FILM COATED ORAL at 22:23

## 2024-05-30 RX ADMIN — OXYCODONE HYDROCHLORIDE 5 MG: 5 TABLET ORAL at 06:03

## 2024-05-30 RX ADMIN — MAGNESIUM OXIDE TAB 400 MG (241.3 MG ELEMENTAL MG) 400 MG: 400 (241.3 MG) TAB at 20:16

## 2024-05-30 RX ADMIN — LEVOTHYROXINE SODIUM 150 MCG: 75 TABLET ORAL at 07:13

## 2024-05-30 RX ADMIN — ROPINIROLE HYDROCHLORIDE 2 MG: 2 TABLET, FILM COATED ORAL at 14:45

## 2024-05-30 RX ADMIN — LIDOCAINE 1 PATCH: 4 PATCH TOPICAL at 08:35

## 2024-05-30 RX ADMIN — Medication 1000 MG: at 08:35

## 2024-05-30 RX ADMIN — ROPINIROLE HYDROCHLORIDE 2 MG: 2 TABLET, FILM COATED ORAL at 20:16

## 2024-05-30 RX ADMIN — MAGNESIUM OXIDE TAB 400 MG (241.3 MG ELEMENTAL MG) 400 MG: 400 (241.3 MG) TAB at 08:35

## 2024-05-30 RX ADMIN — Medication 3 MG: at 22:23

## 2024-05-30 RX ADMIN — OXYCODONE HYDROCHLORIDE 5 MG: 5 TABLET ORAL at 18:16

## 2024-05-30 ASSESSMENT — ACTIVITIES OF DAILY LIVING (ADL)
ADLS_ACUITY_SCORE: 41
HYGIENE/GROOMING: INDEPENDENT
ADLS_ACUITY_SCORE: 41
ADLS_ACUITY_SCORE: 41
HYGIENE/GROOMING: INDEPENDENT
ADLS_ACUITY_SCORE: 41
ADLS_ACUITY_SCORE: 41
ORAL_HYGIENE: INDEPENDENT
ADLS_ACUITY_SCORE: 41
DRESS: STREET CLOTHES
DRESS: STREET CLOTHES
ADLS_ACUITY_SCORE: 41
LAUNDRY: WITH SUPERVISION
ADLS_ACUITY_SCORE: 41
ORAL_HYGIENE: INDEPENDENT

## 2024-05-30 NOTE — PLAN OF CARE
Goal Outcome Evaluation:    Plan of Care Reviewed With: patient Plan of Care Reviewed With: patient      Pt calm and cooperative this morning. Requested Mucinex to loosen her chest up from congestion; she received this already today. Ate 75% of breakfast drinking adequately. Affect appropriate to situation, pt present and social in [place] much of day. Medication compliant, requested Gabapentin x1 for anxiety at 1430.   arrived at 1100 so [place] and  worked on bill paying. Rested in room for about 1.5 hr. Pt hoping to look through her clothes that are in her locker, also she was looking for her R ankle brace that she thought was here on unit. I simply ran out of time to assist with this.  Safety checks in place

## 2024-05-30 NOTE — TELEPHONE ENCOUNTER
[facility] Call Center    Phone Message    May a detailed message be left on voicemail: yes     Reason for Call: Other: Pt calling initially to cancel upcoming appt with Jeannette Mendoza. Pt thought appt was 5/31/24 but it is scheduled for 6/7/24. [place] [address] [place] and is unsure if she will be out by next Friday. Pt opted to keep 6/7/24 appt but requested that writer inform care team of situation and ask them how to best proceed as they may know better when she may be discharged.     Action Taken: Message routed to:  Other: Nursing pool    Travel Screening: Not Applicable     Date of Service: 5/30/24

## 2024-05-30 NOTE — PLAN OF CARE
Assessment/Intervention/Current Symtoms and Care Coordination:  Chart reviewed and patient met with team,   Discussed patient progress, symptomology, and response to treatment.  Discussed the discharge plan and any potential impediments to discharge.     Patient has completed ECT x2 - without complications. Scheduled for #3 tomorrow. Patient reports she is starting to feel better- has been social on unit- engaging with peers, playing cards, attending groups, 1:1 therapy.      Patient requested permission to have her  come today and assist her with paying their bills.  This was approved and  and she were placed in an interview room with patient's laptop.  Requested her  bring her clothes home to do her laundry as her hands are sore and its difficult for her to do it herself.     Discharge Plan or Goal:  Patient will return home when stable  Psychiatry  55 Plus?     Barriers to Discharge:  Severity of depression/inability to function  Initiation of ECT     Referral Status:  None today     Legal Status:  Voluntary     Contacts:  Outpatient Psychiatrist: Jeannette Mendoza [facility]    Primary Physician: Laith Constantino  Family Members: Justin Cleary (Spouse) [phone]        Upcoming Meetings and Dates/Important Information and next steps:  Team update:  Wed

## 2024-05-30 NOTE — PLAN OF CARE
BEH IP Unit Acuity Rating Score (UARS)  Patient is given one point for every criteria they meet.     CRITERIA SCORING   On a 72 hour hold, court hold, committed, stay of commitment, or revocation. 0    Patient LOS on BEH unit exceeds 20 days. 0  LOS: 9   Patient under guardianship, 55+, otherwise medically complex, or under age 11. 1   Suicide ideation without relief of precipitating factors. 1   Current plan for suicide. 0   Current plan for homicide. 0   Imminent risk or actual attempt to seriously harm another without relief of factors precipitating the attempt. 0   Severe dysfunction in daily living (ex: complete neglect for self care, extreme disruption in vegetative function, extreme deterioration in social interactions). 1   Recent (last 7 days) or current physical aggression in the ED or on unit. 0   Restraints or seclusion episode in past 72 hours. 0   Recent (last 7 days) or current verbal aggression, agitation, yelling, etc., while in the ED or unit. 0   Active psychosis. 0   Need for constant or near constant redirection (from leaving, from others, etc).  0   Intrusive or disruptive behaviors. 0   Patient requires 3 or more hours of individualized nursing care per 8-hour shift (i.e. for ADLs, meds, therapeutic interventions). 0   TOTAL 3

## 2024-05-30 NOTE — PLAN OF CARE
"Individual Therapy Note      Date of Service: May 30, 2024    Patient: Winnie goes by \"Winnie,\" uses she/her pronouns    Individuals Present: ROSY Robertson    Session start: 0935  Session end: 1015  Session duration in minutes: 40      Modality Used: CBT, Person Centered, and Motivational Interviewing    Goals: Get back into doing art, get an emotional support dog, stop saying \"I'm sorry.\"    Patient Description of current symptoms: Better, lighter, less catastrophizing     Mental Status Exam:   Attitude: cooperative  Eye Contact: good  Mood: anxious and sad   Affect: appropriate and in normal range and full range  Speech: clear, coherent  Psychomotor Behavior: no evidence of tardive dyskinesia, dystonia, or tics  Thought Process:  logical and linear  Associations: no loose associations  Thought Content: no evidence of suicidal ideation or homicidal ideation  Insight: fair  Judgement: fair  Attention Span and Concentration: intact    Pt progress: Focus of session was on steering conversation towards setting goals and problem-solving versus processing feelings related to Pt's past. Pt began recounting past pain and marital issues, writer asked Pt if it is helpful to discuss the past which was majority of 1 hour session last week. Pt expressed desire to plan for the future and get better, but when Pt began discussing goals, Pt would go back to discussing past. Writer highlighted this pattern, Pt acknowledged this could be reason for interpersonal issues, friends not wanting to listen and lack in empathy. Having stronger supports/relationships Pt reports is one of her goals. Writer explained burnout and encouraged Pt to consider how balanced her friendships are if her pattern is to discuss her past trauma and medical issues at length. Pt shared she would be interested recording herself have a conversation to increase her insight into this.    Pt was able to set goals with assistance of writer redirecting Pt " back to present/future. Pt does report she is better, lighter than when she came in.     Treatment Objective(s) Addressed:   The focus of this session was on identifying treatment goals and building self-esteem     Progress Towards Goals and Assessment of Patient:   Patient is making progress towards treatment goals as evidenced by improved mood, feeling lighter, better.       Therapeutic Intervention(s):   Provided active listening, unconditional positive regard, and validation.   Engaged in social skills training and Taught the link between thoughts, feelings, and behaviors    Plan/next step: Writer will continue to check in with Pt, encouraged Pt to attend group sessions.      [id number] - Psychotherapy (with patient) - 45 (38-52*) min    Patient Active Problem List   Diagnosis    DJD (degenerative joint disease), ankle and foot    Hereditary hemochromatosis (H24)    Pulmonary hypertension (H)    Postablative hypothyroidism    Hx of corticosteroid therapy    Class 2 obesity due to excess calories without serious comorbidity in adult    Prediabetes    KYAW (obstructive sleep apnea)    Type 2 diabetes mellitus without complication, without long-term current use of insulin (H)    Hypokalemia    COPD (chronic obstructive pulmonary disease) (H)    Generalized anxiety disorder    Restless leg syndrome    Vitamin B12 deficiency    Vitamin D deficiency    Morbid obesity (H)    Cord compression (H)    History of cervical spinal surgery    Cervical stenosis of spinal canal    Alcohol use disorder, moderate, in sustained remission (H)    Essential hypertension    Psoriatic arthropathy (H)    Primary osteoarthritis involving multiple joints    Gastroesophageal reflux disease with esophagitis without hemorrhage    Numbness in both hands    Chronic, continuous use of opioids    Trauma and stressor-related disorder    Post-menopausal    Suicidal ideation    Depression with anxiety    Severe recurrent major depression without  psychotic features (H)

## 2024-05-30 NOTE — PLAN OF CARE
The pt visible out on milieu intermittently appeared minimally social with selected peer. Upon approach presented pleasant, calm in mood, flat/labile in affect, endorsed minimal anxiety and fear which the pt attributed to upcoming tomorrow ECT, stated feel embarrassed for her forgetfulness about upon back from ECT, the writer acknowledged the pt concern, reassured and receptive for redirection. The pt acknowledged improvement for depression, denied of SI/HI, AH/VH and contracted for safety. The pt No behavioral or safety concern noted/reported. Adequate food and fluid intake. Comply with med and care. Per requested PRN Melatonin given for sleep aid.     Problem: Adult Behavioral Health Plan of Care  Goal: Develops/Participates in [facility] [place] to Support Successful Transition  Outcome: Progressing  Intervention: Foster Therapeutic [place]  Recent Flowsheet Documentation  Taken 5/30/2024 1600 by Miguel Suarez RN  Trust Relationship/Rapport:   care explained                                                 choices provided   emotional support provided                            empathic listening provided   questions answered                                         reassurance provided   thoughts/feelings acknowledged     Problem: Depression  Goal: Improved Mood  Outcome: Progressing  Intervention: Monitor and Manage Depressive Symptoms  Recent Flowsheet Documentation  Taken 5/30/2024 1600 by Miguel Suarez RN  Supportive Measures:   active listening utilized                                    self-care encouraged   self-reflection promoted                                  self-responsibility promoted   verbalization of feelings encouraged  Family/Support System Care:   involvement promoted                                     presence promoted   self-care encouraged     Problem: Anxiety  Goal: Anxiety Reduction or Resolution  Outcome: Progressing  Intervention: Promote Anxiety Reduction  Recent Flowsheet  Documentation  Taken 5/30/2024 1600 by Miguel Suarez, RN  Supportive Measures:   active listening utilized                                  self-care encouraged   self-reflection promoted                                self-responsibility promoted   verbalization of feelings encouraged  Family/Support System Care:   involvement promoted                                  presence promoted   self-care encouraged   Goal Outcome Evaluation:    Plan of Care Reviewed With: patient

## 2024-05-30 NOTE — PLAN OF CARE
Patient was awake intermittently this shift. Pt did c/o neck and shoulder pain rating it  at 10/10. Denied any mental health Sx. PRN Oxycodone 5 mg  was given for pain; was helpful as pt was able to fall asleep thereafter. Pt appears to have slept for 5 hours; will continue to monitor and offer support.      Problem: Sleep Disturbance  Goal: Adequate Sleep/Rest  Outcome: Progressing   Goal Outcome Evaluation:

## 2024-05-30 NOTE — PLAN OF CARE
In this evening shift, pt said that she felt much better, as compared when she first got back from ECT.  Endorsed anxiety at 5/10 and pain at 8/10. presented with  a flat affect.  Pt complained of pain managed with her scheduled Oxycodone. Pt was also assisted with her TENS unit. Pt continues to be pleasant and cooperative with staff and was engaged with peers.  She likes to tell stories about her friends. Pt sat by [place] [facility] [place] and watched movies with peers. Her appetite was good during supper time.      Problem: Adult Inpatient Plan of Care  Goal: Optimal Comfort and Wellbeing  Outcome: Not Progressing  Intervention: Provide Person-Centered Care  Recent Flowsheet Documentation  Taken 5/29/2024 1326 by Yoselin Garcia RN  Trust Relationship/Rapport:   choices provided   empathic listening provided   questions encouraged   reassurance provided   thoughts/feelings acknowledged     Problem: Sleep Disturbance  Goal: Adequate Sleep/Rest  Outcome: Not Progressing   Goal Outcome Evaluation:    Plan of Care Reviewed With: patient

## 2024-05-30 NOTE — PROGRESS NOTES
"  ----------------------------------------------------------------------------------------------------------  [facility]  Psychiatry Progress Note  Hospital Day #9     Interim History:     The patient's care was discussed with the treatment team and chart notes were reviewed.    Vitals: VSS  Sleep: 5 hours (05/30/24 0655)  Scheduled medications: Took all scheduled medications as prescribed  Psychiatric PRN medications:   Last 24H PRN:     acetaminophen (TYLENOL) tablet 650 mg, 650 mg at 05/29/24 0331    acetaminophen (TYLENOL) tablet 650 mg, 650 mg at 05/29/24 0828    acetaminophen (TYLENOL) tablet 650 mg, 650 mg at 05/29/24 1259    gabapentin (NEURONTIN) capsule 100 mg, 100 mg at 05/29/24 1437    menthol (Topical Analgesic) 2.5% (BENGAY VANISHING SCENT) 2.5 % topical gel, Given at 05/29/24 1957   oxyCODONE (ROXICODONE) tablet 5 mg, 5 mg at 05/30/24 0358     Staff Report:  No acute events over the weekend. No acute safety concerns. Second ECT session yesterday. See staff note for additional details.        Subjective:     Patient Interview:  Winnie was seen in the conference room. She met with the unit therapist this morning, and she was able to make some connections and realizations about her life. She has been feeling teary lately, and she has been feeling like she lives in the past too often.      Her mood has improved since admission. She feels \"lighter, like a boulder has been lifted.\" She attributes this to ECT. She was concerned that she did not realize where she was yesterday after ECT. Patient was informed that this often happens after ECT. She also endorsed some neck and shoulder pain after ECT. She had no further concerns.     ROS:  Patient has pain in her neck and shoulder.  Patient denies acute concerns     Objective:     Vitals:  BP (!) 146/79   Pulse 94   Temp 98.2  F (36.8  C) (Temporal)   Resp 19   Ht 1.676 m (5' 6\")   Wt 88.4 kg (194 lb 14.4 oz)   " "LMP  (LMP Unknown)   SpO2 93%   BMI 31.46 kg/m      Allergies:  Allergies   Allergen Reactions    Serotonin Reuptake Inhibitors (Ssris) Anxiety, Difficulty breathing, Headache, Palpitations and Shortness Of Breath    Buspirone      The patient states she had serotonin syndrome    Cephalexin      Other reaction(s): unknown rxn.    Desvenlafaxine      Serotonin syndrome    Diclofenac Sodium [Diclofenac]      Serotonin syndrome and restless legs syndrome    Gabapentin      Drove on the wrong side of the highway    Levofloxacin      \"CAN'T REMEMBER\"    Penicillins      \"SORES IN MOUTH\"    Riluzole Difficulty breathing and Swelling    Sulfa Antibiotics      \"PT DOES NOT KNOW WHAT THE REACTION WAS\"    Topiramate Other (See Comments)     Frequent urination       Current Medications:  Scheduled:  Current Facility-Administered Medications   Medication Dose Route Frequency Provider Last Rate Last Admin    acetaminophen (TYLENOL) tablet 650 mg  650 mg Oral Q4H PRN Wilner Parker MD   650 mg at 05/29/24 1259    albuterol (PROVENTIL HFA/VENTOLIN HFA) inhaler  2 puff Inhalation Q6H PRN Wilner Parker MD   2 puff at 05/25/24 0050    allopurinol (ZYLOPRIM) tablet 300 mg  300 mg Oral QPM Nallely Barber MD   300 mg at 05/29/24 1957    alum & mag hydroxide-simethicone (MAALOX) suspension 30 mL  30 mL Oral Q4H PRN Wilner Parker MD        ascorbic acid tablet 1,000 mg  1,000 mg Oral Daily Wilner Parker MD   1,000 mg at 05/29/24 1300    benzonatate (TESSALON) capsule 200 mg  200 mg Oral TID PRN Wilner Parker MD   200 mg at 05/21/24 2300    cholecalciferol (VITAMIN D3) capsule 250 mcg  250 mcg Oral Weekly Wilner Parker MD   250 mcg at 05/29/24 1300    cyanocobalamin (VITAMIN B-12) sublingual tablet 500 mcg  500 mcg Sublingual Daily Wilner Parker MD   500 mcg at 05/29/24 1300    ethacrynic acid (EDECRIN) half-tab 12.5 mg  12.5 mg Oral Daily Wilner Parker MD   12.5 mg at 05/29/24 1300    " fluticasone-vilanterol (BREO ELLIPTA) 100-25 MCG/ACT inhaler 1 puff  1 puff Inhalation Daily Wilner Parker MD   1 puff at 05/29/24 0816    And    umeclidinium (INCRUSE ELLIPTA) 62.5 MCG/ACT inhaler 1 puff  1 puff Inhalation Daily Wilner Parker MD   1 puff at 05/29/24 0816    gabapentin (NEURONTIN) capsule 100 mg  100 mg Oral Q6H PRN Wilner Parker MD   100 mg at 05/29/24 1437    gabapentin (NEURONTIN) capsule 400 mg  400 mg Oral At Bedtime Erin Ferrera MD   400 mg at 05/29/24 2301    guaiFENesin (MUCINEX) 12 hr tablet 1,200 mg  1,200 mg Oral BID PRN Wilner Parker MD        HOLD MEDICATION   Does not apply HOLD Alvina Garg DO        Hold Medications for ECT (select and list medications to be held)   Does not apply HOLD Tejinder Correa MD        Hold Medications for ECT (select and list medications to be held)   Does not apply HOLD Boo Riley MD        ibuprofen (ADVIL/MOTRIN) tablet 400 mg  400 mg Oral Q6H PRN Alvina Garg DO        Lidocaine (LIDOCARE) 4 % Patch 1 patch  1 patch Transdermal Q24H Lulu Zacarias MD   1 patch at 05/26/24 0924    magnesium oxide (MAG-OX) tablet 400 mg  400 mg Oral BID Anna Brewer PA-C   400 mg at 05/29/24 1957    melatonin tablet 3 mg  3 mg Oral At Bedtime PRN Wilner Parker MD   3 mg at 05/28/24 2242    menthol (Topical Analgesic) 2.5% (BENGAY VANISHING SCENT) 2.5 % topical gel   Topical Q6H PRN Anna Brewer PA-C   Given at 05/29/24 1957    naloxone (NARCAN) injection 0.2 mg  0.2 mg Intravenous Q2 Min PRN Wilner Parker MD        Or    naloxone (NARCAN) injection 0.4 mg  0.4 mg Intravenous Q2 Min PRN Wilner Parker MD        Or    naloxone (NARCAN) injection 0.2 mg  0.2 mg Intramuscular Q2 Min PRN Wilner Parker MD        Or    naloxone (NARCAN) injection 0.4 mg  0.4 mg Intramuscular Q2 Min PRN Wilner Parker MD        OLANZapine (zyPREXA) tablet 2.5 mg  2.5 mg Oral TID PRN Wilner Parker MD        Or     OLANZapine (zyPREXA) injection 2.5 mg  2.5 mg Intramuscular TID PRN Wilner Parker MD        ondansetron (ZOFRAN ODT) ODT tab 4 mg  4 mg Oral Q6H PRN Wilner Parker MD        oxyCODONE (ROXICODONE) tablet 5 mg  5 mg Oral Q6H Nallely Barber MD   5 mg at 05/30/24 0603    And    oxyCODONE (ROXICODONE) tablet 5 mg  5 mg Oral Daily PRN Nallely Barber MD   5 mg at 05/30/24 0358    pantoprazole (PROTONIX) EC tablet 40 mg  40 mg Oral Daily Wilner Parker MD   40 mg at 05/29/24 0816    polyethylene glycol (MIRALAX) Packet 17 g  17 g Oral Daily PRN Wilner Parker MD        potassium chloride jeannette ER (KLOR-CON M20) CR tablet 20 mEq  20 mEq Oral Daily Wilner Parker MD   20 mEq at 05/29/24 1300    rOPINIRole (REQUIP) tablet 2 mg  2 mg Oral TID Nallely Barber MD   2 mg at 05/29/24 2301    sodium chloride (OCEAN) 0.65 % nasal spray 1 spray  1 spray Both Nostrils Q1H PRN Anna Brewer PA-C        SYNTHROID (Brand only - 75 mcg strength tablets)  150 mcg Oral Once per day on Monday Tuesday Wednesday Thursday Friday Saturday Tejinder Correa MD   150 mcg at 05/29/24 0610    And    SYNTHROID tablet 75 mcg (brand only)  75 mcg Oral Every Sunday Tejinder Correa MD   75 mcg at 05/26/24 0620    zolpidem (AMBIEN) tablet 5 mg  5 mg Oral At Bedtime PRN Wilner Parker MD   5 mg at 05/24/24 0056       PRN:  Current Facility-Administered Medications   Medication Dose Route Frequency Provider Last Rate Last Admin    acetaminophen (TYLENOL) tablet 650 mg  650 mg Oral Q4H PRN Wilner Parker MD   650 mg at 05/29/24 1259    albuterol (PROVENTIL HFA/VENTOLIN HFA) inhaler  2 puff Inhalation Q6H PRN Wilner Parker MD   2 puff at 05/25/24 0050    allopurinol (ZYLOPRIM) tablet 300 mg  300 mg Oral QPM Nallely Barber MD   300 mg at 05/29/24 1957    alum & mag hydroxide-simethicone (MAALOX) suspension 30 mL  30 mL Oral Q4H PRN Wilner Parker MD        ascorbic acid tablet 1,000 mg  1,000 mg Oral Daily  Wilner Parker MD   1,000 mg at 05/29/24 1300    benzonatate (TESSALON) capsule 200 mg  200 mg Oral TID PRN Wilner Parker MD   200 mg at 05/21/24 2300    cholecalciferol (VITAMIN D3) capsule 250 mcg  250 mcg Oral Weekly Wilner Parker MD   250 mcg at 05/29/24 1300    cyanocobalamin (VITAMIN B-12) sublingual tablet 500 mcg  500 mcg Sublingual Daily Wilner Parker MD   500 mcg at 05/29/24 1300    ethacrynic acid (EDECRIN) half-tab 12.5 mg  12.5 mg Oral Daily Wilner Parker MD   12.5 mg at 05/29/24 1300    fluticasone-vilanterol (BREO ELLIPTA) 100-25 MCG/ACT inhaler 1 puff  1 puff Inhalation Daily Wilner Parker MD   1 puff at 05/29/24 0816    And    umeclidinium (INCRUSE ELLIPTA) 62.5 MCG/ACT inhaler 1 puff  1 puff Inhalation Daily Wilner Parker MD   1 puff at 05/29/24 0816    gabapentin (NEURONTIN) capsule 100 mg  100 mg Oral Q6H PRN Wilner Parker MD   100 mg at 05/29/24 1437    gabapentin (NEURONTIN) capsule 400 mg  400 mg Oral At Bedtime Erin Ferrera MD   400 mg at 05/29/24 2301    guaiFENesin (MUCINEX) 12 hr tablet 1,200 mg  1,200 mg Oral BID PRN Wilner Parker MD        HOLD MEDICATION   Does not apply HOLD Alvina Garg DO        Hold Medications for ECT (select and list medications to be held)   Does not apply HOLD Tejinder Correa MD        Hold Medications for ECT (select and list medications to be held)   Does not apply HOLD Boo Riley MD        ibuprofen (ADVIL/MOTRIN) tablet 400 mg  400 mg Oral Q6H PRN Alvina Garg DO        Lidocaine (LIDOCARE) 4 % Patch 1 patch  1 patch Transdermal Q24H Lulu Zacarias MD   1 patch at 05/26/24 0924    magnesium oxide (MAG-OX) tablet 400 mg  400 mg Oral BID Anna Brewer PA-C   400 mg at 05/29/24 1957    melatonin tablet 3 mg  3 mg Oral At Bedtime PRN Wilner Parker MD   3 mg at 05/28/24 2242    menthol (Topical Analgesic) 2.5% (BENGAY VANISHING SCENT) 2.5 % topical gel   Topical Q6H PRN angie Rivera,  Anna MARINELLI PA-C   Given at 05/29/24 1957    naloxone (NARCAN) injection 0.2 mg  0.2 mg Intravenous Q2 Min PRN Wilner Parker MD        Or    naloxone (NARCAN) injection 0.4 mg  0.4 mg Intravenous Q2 Min PRN Wilner Parker MD        Or    naloxone (NARCAN) injection 0.2 mg  0.2 mg Intramuscular Q2 Min PRN Wilner Parker MD        Or    naloxone (NARCAN) injection 0.4 mg  0.4 mg Intramuscular Q2 Min PRN Wilner Parker MD        OLANZapine (zyPREXA) tablet 2.5 mg  2.5 mg Oral TID PRN Wilner Parker MD        Or    OLANZapine (zyPREXA) injection 2.5 mg  2.5 mg Intramuscular TID PRN Wilner Parker MD        ondansetron (ZOFRAN ODT) ODT tab 4 mg  4 mg Oral Q6H PRN Wilner Parker MD        oxyCODONE (ROXICODONE) tablet 5 mg  5 mg Oral Q6H Nallely Barber MD   5 mg at 05/30/24 0603    And    oxyCODONE (ROXICODONE) tablet 5 mg  5 mg Oral Daily PRN Nallely Barber MD   5 mg at 05/30/24 0358    pantoprazole (PROTONIX) EC tablet 40 mg  40 mg Oral Daily Wilner Parker MD   40 mg at 05/29/24 0816    polyethylene glycol (MIRALAX) Packet 17 g  17 g Oral Daily PRN Wilner Parker MD        potassium chloride jeannette ER (KLOR-CON M20) CR tablet 20 mEq  20 mEq Oral Daily Wilner Parker MD   20 mEq at 05/29/24 1300    rOPINIRole (REQUIP) tablet 2 mg  2 mg Oral TID Nallely Barber MD   2 mg at 05/29/24 2301    sodium chloride (OCEAN) 0.65 % nasal spray 1 spray  1 spray Both Nostrils Q1H PRN Anna Brewer PA-C        SYNTHROID (Brand only - 75 mcg strength tablets)  150 mcg Oral Once per day on Monday Tuesday Wednesday Thursday Friday Saturday Tejinder Correa MD   150 mcg at 05/29/24 0610    And    SYNTHROID tablet 75 mcg (brand only)  75 mcg Oral Every Sunday Tejinder Correa MD   75 mcg at 05/26/24 0620    zolpidem (AMBIEN) tablet 5 mg  5 mg Oral At Bedtime PRN Wilner Parker MD   5 mg at 05/24/24 0056       Labs and Imaging:  New results:   No results found for this or any previous visit  (from the past 24 hour(s)).      Data this admission:  - CBC elevated Hgb 17.7  - CMP unremarkable  - TSH normal  - UDS THC positive  - Hgb A1c normal  - Lipids LDL mildly elevated 103 but otherwise unremarkable  - Vitamin B12 unremarkable  - Folate unremarkable  - Vitamin D unremarkable  - Urinalysis unremarkable  - EKG normal sinus rhythm, RBBB QTc 458    Potential Drug Interactions:   Per [employer], combinations of the following drugs has a rating of D, demonstrating that the medications may interact with each other in a clinically significant manner and a patient-specific assessment was made and determined that the benefits outweighed the risks.   - Gabapentin, oxycodone, and Magnesium oxide The following risks include CNS depression.   - Gabapentin and levothyroxine. The following risks include magnesium salts decreasing serum concentration of levothyroxine.     Per [employer], combinations of the following drugs has a rating of C demonstrating that agents may interact in a clinically significant manner but the benefits of the combination of medications often outweigh the risk.   - Ropinirole, oxycodone, and gabapentin. The following risks include CNS depression.   - allopurinol and ethacrynic acid. The following risks include ethacrynic acid increasing concentration of allopurinol.  - ethacrynic acid and oxycodone.  The following risks include oxycodone may enhanced the toxic effects of ethacrynic acid  Treatment team will monitor  for potential side effects and re-evaluate as needed.           Mental Status Exam:     Oriented to:  Grossly Oriented  General:  Awake and Alert  Appearance:  appears stated age and Grooming is adequate  Behavior/Attitude:  cooperative and open  Eye Contact:  appropriate  Psychomotor: normal and no evidence of tics, dystonia, or tardive dyskinesia no catatonia present  Speech:  appropriate volume/tone and talkative  Language: Fluent in English with appropriate syntax and  "vocabulary.  Mood:  \"lighter, like a boulder has been lifted.\"  Affect:  congruent with mood, labile, tearful, and anxious  Thought Process:  coherent, goal directed, and circumstantial  Thought Content:  suicidal ideation (passive), No HI, No VH, and No AH; No apparent delusions  Associations:  intact  Insight:  fair due to recognizing the circumstances of her decompensation.  Judgment:  fair due to willingness to engage in ECT and better her MH  Impulse control: fair  Attention Span:  grossly intact  Concentration:  grossly intact  Recent and Remote Memory:  not formally assessed  Fund of Knowledge:  average  Muscle Strength and Tone: normal  Gait and Station: Normal and notes difficulty with gait     Psychiatric Assessment     Randi Cleary is a 68 year old female previously diagnosed with MDD, recurrent severe, substance induced mood disorder, Unspecified Trauma, CHAVO, borderline personality disorder and alcohol use disorder who presented voluntarily with SI in the context of treatment resistant MDD. On admission she presented with significant symptoms of depression incuding emotional lability, low mood, hopelessness, amotivation, anxiety, anhedonia, low energy, insomnia low appetite, excess guilt and poor concentration. Her affect was dysphroic anjd axious at times with heavy perseveration on medico-surgical dignosises and passive SI causing incresed anxiety. She notes she has had a plan but no intent. Her history of extensive substance use, medical history and chronic pain contribute biologically. Her presentation is further complicated by borderline personality disorder and history of trauma. Additionally, she has a very limited support system, interpersonal conflicts with friends and her spouse. However her last hospitalization was in the 1980's for a suicide attempt via prozac overdose and has been engaged in treatment and present voluntarily. Her presentation is consistent with decompensated Major " Depressive disorder, recurrent severe. Her disorder has been treatment resistant including to TMS and at this time, ECT would be the best course of action to address her symptom severity. Patient warrants inpatient psychiatric hospitalization to maintain her safety.      Psychiatric Plan by Diagnosis      Today's changes:  - none     # MDD, recurrent, severe, with anxious distress  - Patient suffers from treatment resistant depression and has trialed many medications. At this point treatment team will need to do a chart review to review medication trials and determine the best medication to address depressive symptoms long term  - Pt will start ECT 5/24 : Hold high-dose gabapentin/pregabalin after 6pm on the day before ECT (to permit adequate seizure)   - Next ECT date: 5/29  - ECT sessions completed: 1     #Insomnia  - Zolpidem 5mg qpm prn    Pertinent Labs/Monitoring:   - EKG 5/22 - Qtc 458 NSR, RBBB     Additional Plans:  - Patient will be treated in therapeutic milieu with appropriate individual and group therapies as described     Psychiatric Hospital Course:      Randi Cleary was admitted to [facility] as a voluntary patient. ECT and Medicine consult was placed in order to get the patient cleared for an acute series of ECT. Plan for a Right Unilateral ultra-brief pulse given the patient's age and concern for memory and cognitive deficits.     The risks, benefits, alternatives, and side effects were discussed and understood by the patient     Medical Assessment and Plan     Medical diagnoses to be addressed this admission:    # Hypothyroidism  - PTA 150mcg M-Sa, 75mcg on Perez     # KYAW   - Doesn't use CPAP at home, sleep study on 06/2024      # RLS  - Continue PTA Ropinirole 2 mg PO TID  - Gabapentin 400mg qpm  - Magnesium Citrated changed to Magnesium Oxide 400mg BID (per patient and medicine)    #RBBB:  Previously on other EKGs    #Cervical radiculopathy and myelopathy s/p cervical laminoplasty 12/2021     # Chronic Pain  - Continue PTA Roxicodone 5 mg q6h + 5 mg PRN (for max daily dose of 25 mg)  - Menthol 2.5% topical gel q6hrs prn  - TENS Unit     #Dizziness  #Headache  Reports hx of migraines, no vision changes or hearing changes. notes this has been ongoing since she had her spinal surgery.   - CT head 5/22 unremarkable    #Gout  - Nutrition consult  - Allopurinol 300mg qpm  - Flares in left podagra but none currently    #Vitamin B12 Deficiency  - Vit B12 500mcg qday    #Vitamin C Deficiency  - Ascorbic Acid 1000mg    #Vitamin D Deficiency  - 250mcg weekly    #COPD  - Breo Ellipta 1 puff daily   - Incruse Ellipta 1 puff daily  - Tessalon cap 200mg TID prn  - Albuterol 2 puffs q6hr prn  - Mucinex 1200mg BID prn     #GERD  #Hiatal Hernia  - Protonix 40mg daily    #Possible pulmonary HTN  #HTN  Per Medicine note, pt follows with Cardiology here and has scheduled right heart catheterization for 06/12/2024. Has follow up with Cardiology in clinic scheduled for 06/19/2024. PTA edecrin 12.5mg daily and potassium supplement. Electrolytes and renal function stable.Recently lost 50lbs and BP has improved.   - Ethacrynic Acid 12.5mg qday   -Hold Edecrin on date of ECT, continue 12.5mg daily for now  -Continue potassium supplement   - Klorcon 20meq daily     #Hemochromatosis, hereditary  - Hgb Stable at ~17    #Hepatic Steatosis  - Stable  - LFTs wnl, no abdominal pain, distention, N/V    #History of breast cancer s/p mastectomy, chemo and XRT:   - currently in remission      #Alcohol use disorder, in remission:   - No current use. Two years sober     #Hx of pheochromocytoma s/p left adrenalectomy 08/2017:   - Stable  - Follows Endocrinology for this.     #Psoriasis:  - Noted on R hang  - Uses scalp brush    Medical course: Patient was physically examined by the ED prior to being transferred to the unit and was found to be medically stable and appropriate for admission. Medicine consult was done to provide clearance for  ECT. Due to patient's persistent neck pain after surgery in 2021, CT Head was done which was negative. Oxycodone changed from q6hr prn to scheduled with an extra 5mg in the day as needed per PTA regimen. Patient continued to express some nerve pain so gabapentin was increased to 400mg.      Consults:  none    Medical course: Patient was physically examined by the ED prior to being transferred to the unit and was found to be medically stable and appropriate for admission.     Consults: Medicine for ECT Clearance, ECT Consult, Nutrition     Checklist     Legal Status: Voluntary     Safety Assessment:   Behavioral Orders   Procedures    Code 1 - Restrict to Unit    Code 2 - 1:1 Staff Supervision     For coming down to ECT only    Discontinue 1:1 attendant for suicide risk     Order Specific Question:   I have performed an in person assessment of the patient     Answer:   Based on this assessment the patient no longer requires a one on one attendant at this point in time.     Order Specific Question:   Rationale     Answer:   Patient States able to remain safe in hospital     Order Specific Question:   Rationale     Answer:   Modifications to care environment made to mitigate safety risk     Order Specific Question:   Rationale     Answer:   Routine observations are sufficient to monitor safety.    Electroconvulsive therapy     Series of up to 12 treatments. Begin Date: 5/24/24     Treating Psychiatrist providing ECT:  Ruthann     Notified on:  5/21/24    Electroconvulsive therapy     Series of up to 12 treatments. Begin Date: 5/24/24     Treating Psychiatrist providing ECT:  Ruthann     Notified on:  5/21/24    Fall precautions    Routine Programming     As clinically indicated    Status 15     Every 15 minutes.    Suicide precautions: Suicide Risk: MODERATE; Clinical rationale to override score: lack of access to a plan for self-harm     Order Specific Question:   Suicide Risk     Answer:   MODERATE     Order Specific  Question:   Clinical rationale to override score:     Answer:   lack of access to a plan for self-harm       Risk Assessment:  Risk for harm is moderate-high.  Risk factors: SI, maladaptive coping, trauma, and past behaviors  Protective factors: engaged in treatment     SIO: Discontinued    Disposition: Pending stabilization, medication optimization, & development of a safe discharge plan.     Attestations   Reina Vallecillo, MS3     I saw and evaluated the patient. I personally spent a total of 55 minutes on care for this patient on the date of the encounter. This includes face-to-face as well as non-face-to-face time reviewing the chart, lab review, and coordination of care.     Faustino Beckett MD   Chief of Mental Health & Addiction Service Line

## 2024-05-31 ENCOUNTER — ANESTHESIA (OUTPATIENT)
Dept: BEHAVIORAL HEALTH | Facility: CLINIC | Age: 69
DRG: 881 | End: 2024-05-31
Payer: MEDICARE

## 2024-05-31 ENCOUNTER — APPOINTMENT (OUTPATIENT)
Dept: BEHAVIORAL HEALTH | Facility: CLINIC | Age: 69
DRG: 881 | End: 2024-05-31
Payer: MEDICARE

## 2024-05-31 ENCOUNTER — ANESTHESIA EVENT (OUTPATIENT)
Dept: BEHAVIORAL HEALTH | Facility: CLINIC | Age: 69
DRG: 881 | End: 2024-05-31
Payer: MEDICARE

## 2024-05-31 ENCOUNTER — TELEPHONE (OUTPATIENT)
Dept: CARDIOLOGY | Facility: CLINIC | Age: 69
End: 2024-05-31
Payer: MEDICARE

## 2024-05-31 PROCEDURE — 250N000013 HC RX MED GY IP 250 OP 250 PS 637

## 2024-05-31 PROCEDURE — 250N000009 HC RX 250: Performed by: ANESTHESIOLOGY

## 2024-05-31 PROCEDURE — 250N000011 HC RX IP 250 OP 636: Performed by: PSYCHIATRY & NEUROLOGY

## 2024-05-31 PROCEDURE — 124N000002 HC R&B MH UMMC

## 2024-05-31 PROCEDURE — 99232 SBSQ HOSP IP/OBS MODERATE 35: CPT | Performed by: STUDENT IN AN ORGANIZED HEALTH CARE EDUCATION/TRAINING PROGRAM

## 2024-05-31 PROCEDURE — 370N000017 HC ANESTHESIA TECHNICAL FEE, PER MIN: Performed by: ANESTHESIOLOGY

## 2024-05-31 PROCEDURE — 250N000013 HC RX MED GY IP 250 OP 250 PS 637: Performed by: PHYSICIAN ASSISTANT

## 2024-05-31 PROCEDURE — 90870 ELECTROCONVULSIVE THERAPY: CPT

## 2024-05-31 PROCEDURE — 90870 ELECTROCONVULSIVE THERAPY: CPT | Performed by: ANESTHESIOLOGY

## 2024-05-31 PROCEDURE — 90870 ELECTROCONVULSIVE THERAPY: CPT | Performed by: PSYCHIATRY & NEUROLOGY

## 2024-05-31 PROCEDURE — 250N000011 HC RX IP 250 OP 636: Performed by: ANESTHESIOLOGY

## 2024-05-31 RX ORDER — FENTANYL CITRATE 50 UG/ML
25 INJECTION, SOLUTION INTRAMUSCULAR; INTRAVENOUS EVERY 5 MIN PRN
Status: DISCONTINUED | OUTPATIENT
Start: 2024-05-31 | End: 2024-05-31

## 2024-05-31 RX ORDER — METHOHEXITAL IN WATER/PF 100MG/10ML
SYRINGE (ML) INTRAVENOUS PRN
Status: DISCONTINUED | OUTPATIENT
Start: 2024-05-31 | End: 2024-05-31

## 2024-05-31 RX ORDER — LABETALOL HYDROCHLORIDE 5 MG/ML
INJECTION, SOLUTION INTRAVENOUS PRN
Status: DISCONTINUED | OUTPATIENT
Start: 2024-05-31 | End: 2024-05-31

## 2024-05-31 RX ORDER — SODIUM CHLORIDE, SODIUM LACTATE, POTASSIUM CHLORIDE, CALCIUM CHLORIDE 600; 310; 30; 20 MG/100ML; MG/100ML; MG/100ML; MG/100ML
INJECTION, SOLUTION INTRAVENOUS CONTINUOUS
Status: DISCONTINUED | OUTPATIENT
Start: 2024-05-31 | End: 2024-05-31

## 2024-05-31 RX ORDER — HYDROMORPHONE HCL IN WATER/PF 6 MG/30 ML
0.2 PATIENT CONTROLLED ANALGESIA SYRINGE INTRAVENOUS EVERY 5 MIN PRN
Status: DISCONTINUED | OUTPATIENT
Start: 2024-05-31 | End: 2024-05-31

## 2024-05-31 RX ORDER — ONDANSETRON 2 MG/ML
4 INJECTION INTRAMUSCULAR; INTRAVENOUS EVERY 30 MIN PRN
Status: DISCONTINUED | OUTPATIENT
Start: 2024-05-31 | End: 2024-05-31

## 2024-05-31 RX ORDER — ONDANSETRON 4 MG/1
4 TABLET, ORALLY DISINTEGRATING ORAL EVERY 30 MIN PRN
Status: DISCONTINUED | OUTPATIENT
Start: 2024-05-31 | End: 2024-05-31

## 2024-05-31 RX ORDER — NALOXONE HYDROCHLORIDE 0.4 MG/ML
0.1 INJECTION, SOLUTION INTRAMUSCULAR; INTRAVENOUS; SUBCUTANEOUS
Status: DISCONTINUED | OUTPATIENT
Start: 2024-05-31 | End: 2024-05-31

## 2024-05-31 RX ORDER — DEXAMETHASONE SODIUM PHOSPHATE 4 MG/ML
4 INJECTION, SOLUTION INTRA-ARTICULAR; INTRALESIONAL; INTRAMUSCULAR; INTRAVENOUS; SOFT TISSUE
Status: DISCONTINUED | OUTPATIENT
Start: 2024-05-31 | End: 2024-05-31

## 2024-05-31 RX ORDER — HYDROMORPHONE HCL IN WATER/PF 6 MG/30 ML
0.4 PATIENT CONTROLLED ANALGESIA SYRINGE INTRAVENOUS EVERY 5 MIN PRN
Status: DISCONTINUED | OUTPATIENT
Start: 2024-05-31 | End: 2024-05-31

## 2024-05-31 RX ORDER — FENTANYL CITRATE 50 UG/ML
50 INJECTION, SOLUTION INTRAMUSCULAR; INTRAVENOUS EVERY 5 MIN PRN
Status: DISCONTINUED | OUTPATIENT
Start: 2024-05-31 | End: 2024-05-31

## 2024-05-31 RX ADMIN — PANTOPRAZOLE SODIUM 40 MG: 40 TABLET, DELAYED RELEASE ORAL at 10:40

## 2024-05-31 RX ADMIN — OXYCODONE HYDROCHLORIDE 5 MG: 5 TABLET ORAL at 00:29

## 2024-05-31 RX ADMIN — OXYCODONE HYDROCHLORIDE 5 MG: 5 TABLET ORAL at 17:54

## 2024-05-31 RX ADMIN — UMECLIDINIUM 1 PUFF: 62.5 AEROSOL, POWDER ORAL at 07:56

## 2024-05-31 RX ADMIN — GUAIFENESIN 600 MG: 600 TABLET ORAL at 10:40

## 2024-05-31 RX ADMIN — Medication 12.5 MG: at 10:40

## 2024-05-31 RX ADMIN — OXYCODONE HYDROCHLORIDE 5 MG: 5 TABLET ORAL at 04:13

## 2024-05-31 RX ADMIN — MAGNESIUM OXIDE TAB 400 MG (241.3 MG ELEMENTAL MG) 400 MG: 400 (241.3 MG) TAB at 20:27

## 2024-05-31 RX ADMIN — MIDAZOLAM 2 MG: 1 INJECTION INTRAMUSCULAR; INTRAVENOUS at 08:44

## 2024-05-31 RX ADMIN — OXYCODONE HYDROCHLORIDE 5 MG: 5 TABLET ORAL at 23:17

## 2024-05-31 RX ADMIN — LABETALOL HYDROCHLORIDE 20 MG: 5 INJECTION, SOLUTION INTRAVENOUS at 08:36

## 2024-05-31 RX ADMIN — ACETAMINOPHEN 650 MG: 325 TABLET, FILM COATED ORAL at 14:29

## 2024-05-31 RX ADMIN — GUAIFENESIN 600 MG: 600 TABLET ORAL at 20:27

## 2024-05-31 RX ADMIN — GABAPENTIN 400 MG: 400 CAPSULE ORAL at 22:31

## 2024-05-31 RX ADMIN — ROPINIROLE HYDROCHLORIDE 2 MG: 2 TABLET, FILM COATED ORAL at 22:31

## 2024-05-31 RX ADMIN — OXYCODONE HYDROCHLORIDE 5 MG: 5 TABLET ORAL at 12:21

## 2024-05-31 RX ADMIN — ROPINIROLE HYDROCHLORIDE 2 MG: 2 TABLET, FILM COATED ORAL at 14:29

## 2024-05-31 RX ADMIN — ALLOPURINOL 300 MG: 300 TABLET ORAL at 20:29

## 2024-05-31 RX ADMIN — Medication 500 MCG: at 10:40

## 2024-05-31 RX ADMIN — Medication 100 MG: at 08:36

## 2024-05-31 RX ADMIN — POTASSIUM CHLORIDE 20 MEQ: 1500 TABLET, EXTENDED RELEASE ORAL at 10:40

## 2024-05-31 RX ADMIN — Medication: at 17:44

## 2024-05-31 RX ADMIN — Medication 1000 MG: at 10:40

## 2024-05-31 RX ADMIN — BENZONATATE 200 MG: 100 CAPSULE ORAL at 17:53

## 2024-05-31 RX ADMIN — ACETAMINOPHEN 650 MG: 325 TABLET, FILM COATED ORAL at 06:40

## 2024-05-31 RX ADMIN — SUCCINYLCHOLINE CHLORIDE 90 MG: 20 INJECTION, SOLUTION INTRAMUSCULAR; INTRAVENOUS; PARENTERAL at 08:36

## 2024-05-31 RX ADMIN — MAGNESIUM OXIDE TAB 400 MG (241.3 MG ELEMENTAL MG) 400 MG: 400 (241.3 MG) TAB at 10:40

## 2024-05-31 RX ADMIN — ALBUTEROL SULFATE 2 PUFF: 90 AEROSOL, METERED RESPIRATORY (INHALATION) at 17:41

## 2024-05-31 RX ADMIN — ROPINIROLE HYDROCHLORIDE 2 MG: 2 TABLET, FILM COATED ORAL at 20:29

## 2024-05-31 RX ADMIN — FLUTICASONE FUROATE AND VILANTEROL TRIFENATATE 1 PUFF: 100; 25 POWDER RESPIRATORY (INHALATION) at 07:55

## 2024-05-31 RX ADMIN — GABAPENTIN 100 MG: 100 CAPSULE ORAL at 12:21

## 2024-05-31 ASSESSMENT — ACTIVITIES OF DAILY LIVING (ADL)
HYGIENE/GROOMING: INDEPENDENT
ADLS_ACUITY_SCORE: 41
DRESS: STREET CLOTHES
ADLS_ACUITY_SCORE: 41
LAUNDRY: WITH SUPERVISION
ADLS_ACUITY_SCORE: 41
ORAL_HYGIENE: INDEPENDENT
ADLS_ACUITY_SCORE: 41

## 2024-05-31 ASSESSMENT — COPD QUESTIONNAIRES: COPD: 1

## 2024-05-31 NOTE — ANESTHESIA POSTPROCEDURE EVALUATION
Patient: Randi Cleary    Procedure: * No procedures listed *       Anesthesia Type:  General    Note:  Disposition: Inpatient   Postop Pain Control: Uneventful            Sign Out: Well controlled pain   PONV: No   Neuro/Psych:             Events: Awareness            Sign Out: PLANNED postop sedation   Airway/Respiratory: Uneventful            Sign Out: Acceptable/Baseline resp. status   CV/Hemodynamics: Uneventful            Sign Out: Acceptable CV status; No obvious hypovolemia; No obvious fluid overload   Other NRE:    DID A NON-ROUTINE EVENT OCCUR? YES    Event details/Postop Comments:  I was inducing the patient and instead of giving the Brevital first, I did give the muscle relaxant. I noticed it right away and informed Dr Beavers.We did go on to complete the induction with the induction agent and in the meantime she was getting PPV. After the ECT treatment, patient was given 2 mg of midazolam. She woke up slowly and after being fully awake , I talked with her about the error that happened at the beginning. She was very understanding and she was grateful that I talked with her right away. She mentioned that her sister was a retired RN . The Recovery Nurse was present Stefani and the medical student. Patient was not distressed by our discussion. She thanked me for talking with her.            Last vitals:  Vitals:    05/31/24 0940 05/31/24 0950 05/31/24 1026   BP: 125/83 132/66 133/53   Pulse: 75 74 55   Resp: 14 15 16   Temp: 36.1  C (97  F) 36.2  C (97.1  F) 36.8  C (98.3  F)   SpO2: 94% 94% 93%       Electronically Signed By: Carmen Saldana MD  May 31, 2024  2:08 PM

## 2024-05-31 NOTE — ANESTHESIA POSTPROCEDURE EVALUATION
Patient: Randi Cleary    Procedure: * No procedures listed *       Anesthesia Type:  General    Note:  Disposition: Inpatient   Postop Pain Control: Uneventful            Sign Out: Well controlled pain   PONV: No   Neuro/Psych:             Events: Awareness            Sign Out: Acceptable/Baseline neuro status   Airway/Respiratory: Uneventful            Sign Out: Acceptable/Baseline resp. status   CV/Hemodynamics: Uneventful            Sign Out: Acceptable CV status; No obvious hypovolemia; No obvious fluid overload   Other NRE:    DID A NON-ROUTINE EVENT OCCUR? YES    Event details/Postop Comments:  I did start giving the muscle relaxant ahead of the brevital. I noticed it right away and informed the Psychiatrist Dr Beavers that I had made a mistake. She advised that I go ahead and complete the induction medications. We did complete the anesthetic and gave her  Midazolam 2 mg post op.   She recovered slowly and  but adverse complications.           Last vitals:  Vitals:    05/31/24 0940 05/31/24 0950 05/31/24 1026   BP: 125/83 132/66 133/53   Pulse: 75 74 55   Resp: 14 15 16   Temp: 36.1  C (97  F) 36.2  C (97.1  F) 36.8  C (98.3  F)   SpO2: 94% 94% 93%       Electronically Signed By: Carmen Saldana MD  May 31, 2024  1:28 PM

## 2024-05-31 NOTE — TELEPHONE ENCOUNTER
Patient called and left a message. She is inpatient and wants to push of cardiology testing due to her mental health. She is inpatient at this time and being treated.     OK to push off RHC and follow-up with Dr. Edwards at this time. We will follow-up with her at another time.     Called patient back on inpatient phone and updated we can push off testing at this time.     Liza Reynoso RN on 5/31/2024 at 12:31 PM

## 2024-05-31 NOTE — ANESTHESIA PREPROCEDURE EVALUATION
Anesthesia Pre-Procedure Evaluation    Patient: Randi Cleary   MRN: [id number] : 1955        Procedure : * No procedures listed *          Past Medical History:   Diagnosis Date    Bipolar 2 disorder (H)     Breast cancer (H)     lumpectomy, radiation, chemo    Chronic pain syndrome     COPD (chronic obstructive pulmonary disease) (H)     asthma    Cord compression (H) 2021    Dizzy     Drug tolerance     opioid    Esophageal reflux     Fatigue     Generalized anxiety disorder     Graves disease     Hemochromatosis 2018    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; H63D not detected    History of breast cancer 2020    Formatting of this note might be different from the original. Created by Conversion  Replacement Utility updated for latest IMO load Formatting of this note might be different from the original. Created by Conversion  Replacement Utility updated for latest IMO load    History of corticosteroid therapy 2019    History of partial adrenalectomy (H24) 2019    History of pheochromocytoma 2019    Hx antineoplastic chemotherapy     Hx of radiation therapy     Hyperlipidaemia     Hypertension     Impaired fasting glucose     Injury of neck, whiplash 07/15/2021    Joint pain     KYAW (obstructive sleep apnea) 2016    Osteopenia     Pheochromocytoma, left 2017    laparoscopically removed    Postablative hypothyroidism 1995    Prediabetes 10/03/2019    by A1c    Psoriasis     Psoriatic arthropathy (H)     Right rotator cuff tear     RLS (restless legs syndrome)     on ropinorole    Sacroiliitis (H24)     Serotonin syndrome 2020    [facility] - While on desvenlafaxine 100mg    Snoring     Spinal stenosis     Status post coronary angiogram 10/03/2019    Urinary incontinence     Vitamin B 12 deficiency 2009    Vitamin D deficiency 2010      Past Surgical History:   Procedure Laterality Date    ARTHRODESIS ANKLE      ARTHROPLASTY ANKLE Right 2015     Procedure: ARTHROPLASTY ANKLE;  Surgeon: Jason Coughlin MD;  Location: [facility]    ARTHROPLASTY REVISION ANKLE Right 6/29/2015    Procedure: ARTHROPLASTY REVISION ANKLE;  Surgeon: Jason Coughlin MD;  Location: [facility]    BIOPSY BREAST      BREAST BIOPSY, CORE RT/LT      COLONOSCOPY      COLONOSCOPY N/A 2/25/2021    Procedure: COLONOSCOPY;  Surgeon: Guru Elke Tolbert MD;  Location:  GI    CV CORONARY ANGIOGRAM N/A 10/3/2019    Procedure: CV CORONARY ANGIOGRAM;  Surgeon: Bryce Pierre MD;  Location:  HEART CARDIAC CATH LAB    CV RIGHT HEART CATH MEASUREMENTS RECORDED N/A 10/3/2019    Procedure: CV RIGHT HEART CATH;  Surgeon: Bryce Pierre MD;  Location:  HEART CARDIAC CATH LAB    ESOPHAGOSCOPY, GASTROSCOPY, DUODENOSCOPY (EGD), COMBINED N/A 2/25/2021    Procedure: ESOPHAGOGASTRODUODENOSCOPY, WITH BIOPSY;  Surgeon: Guru Elke Tolbert MD;  Location:  GI    EYE SURGERY  2021    HC REMOVE TONSILS/ADENOIDS,<13 Y/O      Description: Tonsillectomy With Adenoidectomy;  Recorded: 04/07/2010;    IR LUMBAR EPIDURAL STEROID INJECTION  10/26/2004    IR LUMBAR EPIDURAL STEROID INJECTION  11/16/2004    IR LUMBAR EPIDURAL STEROID INJECTION  12/21/2004    IR LUMBAR EPIDURAL STEROID INJECTION  6/8/2006    JOINT REPLACEMENT      LAMINOPLASTY CERVICAL POSTERIOR THREE+ LEVELS Left 12/21/2021    Procedure: CERVICAL 3-CERVICAL 6 LEFT OPEN DOOR LAMINOPLASTY AND LEFT CERVICAL 4-5 AND CERVICAL 6-7 POSTERIOR FORAMINOTOMY;  Surgeon: Angela Gregory MD;  Location: [facility]    LAPAROSCOPIC ADRENALECTOMY Left 08/02/2017    pheochromocytoma    LAPAROSCOPIC ADRENALECTOMY Left 8/2/2017    Procedure: LAPAROSCOPIC LEFT ADRENALECTOMY, ;  Surgeon: Gab Linares MD;  Location: [facility];  Service:     LENGTHEN TENDON ACHILLES Right 6/29/2015    Procedure: LENGTHEN TENDON ACHILLES;  Surgeon: Jason Coughlin MD;  Location: [facility]    LUMPECTOMY BREAST       "LUMPECTOMY BREAST Left 1994    MAMMOPLASTY REDUCTION Right 2015    [place]    MAMMOPLASTY REDUCTION Right     approx late /    MASTECTOMY      left lumpectomy with axillary node dissection    MASTECTOMY MODIFIED RADICAL      OTHER SURGICAL HISTORY Right     reconstructive breast surgery    OTHER SURGICAL HISTORY      Adrenalectomy for pheochromocytoma    DE MASTECTOMY, MODIFIED RADICAL      Description: Modified Radical Mastectomy Left Breast;  Recorded: 2010;    REPAIR HAMMER TOE Right 2015    Procedure: REPAIR HAMMER TOE;  Surgeon: Jason Coughlin MD;  Location: [facility]    TONSILLECTOMY      TONSILLECTOMY & ADENOIDECTOMY      [facility] ARTHRODESIS,ANKLE,OPEN Right     Description: Ankle Arthrodesis;  Recorded: 2010;      Allergies   Allergen Reactions    Serotonin Reuptake Inhibitors (Ssris) Anxiety, Difficulty breathing, Headache, Palpitations and Shortness Of Breath    Buspirone      The patient states she had serotonin syndrome    Cephalexin      Other reaction(s): unknown rxn.    Desvenlafaxine      Serotonin syndrome    Diclofenac Sodium [Diclofenac]      Serotonin syndrome and restless legs syndrome    Gabapentin      Drove on the wrong side of the highway    Levofloxacin      \"CAN'T REMEMBER\"    Penicillins      \"SORES IN MOUTH\"    Riluzole Difficulty breathing and Swelling    Sulfa Antibiotics      \"PT DOES NOT KNOW WHAT THE REACTION WAS\"    Topiramate Other (See Comments)     Frequent urination      Social History     Tobacco Use    Smoking status: Former     Current packs/day: 0.00     Average packs/day: 2.5 packs/day for 29.2 years (72.9 ttl pk-yrs)     Types: Cigarettes     Start date: 1971     Quit date: 2000     Years since quittin.9     Passive exposure: Past    Smokeless tobacco: Never   Substance Use Topics    Alcohol use: Not Currently     Comment: relapse 2021 sober       Wt Readings from Last 1 Encounters:   24 87.5 kg (193 lb)    "     Anesthesia Evaluation   Pt has had prior anesthetic.     No history of anesthetic complications       ROS/MED HX  ENT/Pulmonary:     (+) sleep apnea, uses CPAP,                        COPD,              Neurologic:       Cardiovascular: Comment: Pulmonary HTN.  Followed by Cardiology.  Next right heart cath scheduled for 6/19/2024.    (+)  hypertension- -   -  - -                                 Previous cardiac testing   Echo: Date: Results:    Stress Test:  Date: Results:    ECG Reviewed:  Date: 5/21/2024 Results:  RBBB, sinus rhythm  Cath:  Date: Results:      METS/Exercise Tolerance:     Hematologic:       Musculoskeletal: Comment: S/P Cervical Surgery      GI/Hepatic: Comment: Hepatic steatosis    (+) GERD,                   Renal/Genitourinary:       Endo: Comment: Graves Disease. S/P Radioiodine Tx.  Hx of pheochromocytoma, S/P Left Adrenalectomy 8/2017    (+)          thyroid problem,     Obesity,       Psychiatric/Substance Use: Comment: MDD, recurrent, severe, with anxious distress      Infectious Disease:       Malignancy:   (+) Malignancy, History of Breast.Breast CA Remission status post Surgery and Chemo.      Other:            Physical Exam    Airway  airway exam normal      Mallampati: II   TM distance: > 3 FB   Neck ROM: full   Mouth opening: > 3 cm    Respiratory Devices and Support         Dental       (+) Minor Abnormalities - some fillings, tiny chips      Cardiovascular   cardiovascular exam normal          Pulmonary   pulmonary exam normal            Other findings: S/P Cervical Surgery, good ROM  OUTSIDE LABS:  CBC:   Lab Results   Component Value Date    WBC 7.1 05/22/2024    WBC 7.9 05/21/2024    HGB 17.7 (H) 05/22/2024    HGB 17.7 (H) 05/21/2024    HCT 49.9 (H) 05/22/2024    HCT 49.6 (H) 05/21/2024     05/22/2024     05/21/2024     BMP:   Lab Results   Component Value Date     05/22/2024     05/21/2024    POTASSIUM 3.8 05/22/2024    POTASSIUM 3.8 05/21/2024     CHLORIDE 104 05/22/2024    CHLORIDE 104 05/21/2024    CO2 24 05/22/2024    CO2 24 05/21/2024    BUN 9.3 05/22/2024    BUN 11.2 05/21/2024    CR 0.57 05/22/2024    CR 0.57 05/21/2024     (H) 05/22/2024    GLC 86 05/21/2024     COAGS:   Lab Results   Component Value Date    PTT 34 12/13/2021    INR 0.94 12/13/2021     POC:   Lab Results   Component Value Date     (H) 02/25/2021     HEPATIC:   Lab Results   Component Value Date    ALBUMIN 4.1 05/22/2024    PROTTOTAL 7.2 05/22/2024    ALT 23 05/22/2024    AST 27 05/22/2024    ALKPHOS 86 05/22/2024    BILITOTAL 0.6 05/22/2024     OTHER:   Lab Results   Component Value Date    A1C 5.6 05/22/2024    LINDA 9.6 05/22/2024    MAG 1.9 05/22/2024    TSH 0.58 05/21/2024    T4 1.49 03/29/2024    T3 114 01/18/2023    CRP <2.9 11/16/2021    SED 8 10/17/2023       Anesthesia Plan    ASA Status:  3    NPO Status:  NPO Appropriate    Anesthesia Type: General.     - Airway: Mask Only   Induction: Intravenous.   Maintenance: N/A.        Consents    Anesthesia Plan(s) and associated risks, benefits, and realistic alternatives discussed. Questions answered and patient/representative(s) expressed understanding.     - Discussed: Risks, Benefits and Alternatives for BOTH SEDATION and the PROCEDURE were discussed     - Discussed with:  Patient      - Extended Intubation/Ventilatory Support Discussed: No.      - Patient is DNR/DNI Status: No     Use of blood products discussed: No .     Postoperative Care            Comments:               Carmen Saldana MD    I have reviewed the pertinent notes and labs in the chart from the past 30 days and (re)examined the patient.  Any updates or changes from those notes are reflected in this note.

## 2024-05-31 NOTE — PLAN OF CARE
BEH IP Unit Acuity Rating Score (UARS)  Patient is given one point for every criteria they meet.    CRITERIA SCORING   On a 72 hour hold, court hold, committed, stay of commitment, or revocation. 0    Patient LOS on BEH unit exceeds 20 days. 0  LOS: 10   Patient under guardianship, 55+, otherwise medically complex, or under age 11. 1   Suicide ideation without relief of precipitating factors. 1   Current plan for suicide. 0   Current plan for homicide. 0   Imminent risk or actual attempt to seriously harm another without relief of factors precipitating the attempt. 0   Severe dysfunction in daily living (ex: complete neglect for self care, extreme disruption in vegetative function, extreme deterioration in social interactions). 1   Recent (last 7 days) or current physical aggression in the ED or on unit. 0   Restraints or seclusion episode in past 72 hours. 0   Recent (last 7 days) or current verbal aggression, agitation, yelling, etc., while in the ED or unit. 0   Active psychosis. 0   Need for constant or near constant redirection (from leaving, from others, etc).  0   Intrusive or disruptive behaviors. 0   Patient requires 3 or more hours of individualized nursing care per 8-hour shift (i.e. for ADLs, meds, therapeutic interventions). 0   TOTAL 3

## 2024-05-31 NOTE — PLAN OF CARE
Assessment/Intervention/Current Symtoms and Care Coordination:  Chart reviewed and patient met with team,   Discussed patient progress, symptomology, and response to treatment.  Discussed the discharge plan and any potential impediments to discharge.     Patient has completed ECT x3 this morning- without complications.   Patient reports she is starting to feel better- denies thoughts of suicide. has been social on unit- engaging with peers, playing cards, attending groups, 1:1 therapy.    Patient was able to complete paying pa     Discharge Plan or Goal:  Patient will return home when stable  Psychiatry  55 Plus?     Barriers to Discharge:  Severity of depression/inability to function  Initiation of ECT- unable to do ECT as outpatient as  works- can't provide transportation/care.     Referral Status:  None today     Legal Status:  Voluntary     Contacts:  Outpatient Psychiatrist: Jeannette OCAMPO [facility]    Primary Physician: Laith Constantino  Family Members: Justin Cleary (Spouse) [phone]        Upcoming Meetings and Dates/Important Information and next steps:  Team update:  Wed

## 2024-05-31 NOTE — PLAN OF CARE
The pt spent most of the shift out on milieu watching TV and minimally social with selected peer. The pt presented pleasant, calm in mood, flat to bright in affect upon interaction, endorsed improvement in mood but endorsed minimal anxiety which the pt attributed to ECT procedure today. Complain of minimal pain of wrist stiffness which managed with PRN Bengay topical gel. The pt complain intermittent wheezing cough and sore throat, denied of shortness of breath, per requested PRN Albuterol inhaler and Tessalon given, and relieved stated. Resident updated and PRN Benzocaine lozenge in place. No behavioral escalation and safety concern noted. Ate 75% supper and adequate fluid intake.  and her girlfriend visited and went well. Comply with med and care. The pt assisted with TENS pad placement in room. The pt given scheduled Oxycodone.     Problem: Adult Behavioral Health Plan of Care  Goal: Develops/Participates in Therapeutic [place] to Support Successful Transition  Outcome: Progressing  Intervention: Foster Therapeutic [place]  Recent Flowsheet Documentation  Taken 5/31/2024 1600 by Miguel Suarez RN  Trust Relationship/Rapport:   care explained                                                      choices provided   emotional support provided                                empathic listening provided   questions answered                                              reassurance provided   thoughts/feelings acknowledged     Problem: Depression  Goal: Improved Mood  Outcome: Progressing  Intervention: Monitor and Manage Depressive Symptoms  Recent Flowsheet Documentation  Taken 5/31/2024 1600 by Miguel Suarez RN  Supportive Measures:   active listening utilized                              self-care encouraged   self-reflection promoted                           self-responsibility promoted   verbalization of feelings encouraged  Family/Support System Care:   involvement promoted   self-care encouraged      Problem: Anxiety  Goal: Anxiety Reduction or Resolution  Outcome: Progressing  Intervention: Promote Anxiety Reduction  Recent Flowsheet Documentation  Taken 5/31/2024 1600 by Miguel Suarez, RN  Supportive Measures:   active listening utilized                                  self-care encouraged   self-reflection promoted                               self-responsibility promoted   verbalization of feelings encouraged  Family/Support System Care:   involvement promoted   self-care encouraged   Goal Outcome Evaluation:    Plan of Care Reviewed With: patient

## 2024-05-31 NOTE — PROGRESS NOTES
"  ----------------------------------------------------------------------------------------------------------  [facility]  Psychiatry Progress Note  Hospital Day #10     Interim History:     The patient's care was discussed with the treatment team and chart notes were reviewed.    Vitals: VSS  Sleep: 5 hours (05/30/24 0655)  Scheduled medications: Took all scheduled medications as prescribed  Psychiatric PRN medications:   Last 24H PRN:     gabapentin (NEURONTIN) capsule 100 mg, 100 mg at 05/30/24 1446    melatonin tablet 3 mg, 3 mg at 05/30/24 2223    oxyCODONE (ROXICODONE) tablet 5 mg, 5 mg at 05/31/24 0413      Staff Report:  No acute events over the weekend. No acute safety concerns. Third ECT session today. See staff note for additional details.        Subjective:     Patient Interview:  Winnie was seen in the conference room.       ROS:  Patient has pain in her neck and shoulder.  Patient denies acute concerns     Objective:     Vitals:  /77 (BP Location: Right arm, Patient Position: Sitting, Cuff Size: Adult Regular)   Pulse 90   Temp 98.1  F (36.7  C) (Oral)   Resp 16   Ht 1.676 m (5' 6\")   Wt 87.5 kg (193 lb)   LMP  (LMP Unknown)   SpO2 98%   BMI 31.15 kg/m      Allergies:  Allergies   Allergen Reactions    Serotonin Reuptake Inhibitors (Ssris) Anxiety, Difficulty breathing, Headache, Palpitations and Shortness Of Breath    Buspirone      The patient states she had serotonin syndrome    Cephalexin      Other reaction(s): unknown rxn.    Desvenlafaxine      Serotonin syndrome    Diclofenac Sodium [Diclofenac]      Serotonin syndrome and restless legs syndrome    Gabapentin      Drove on the wrong side of the highway    Levofloxacin      \"CAN'T REMEMBER\"    Penicillins      \"SORES IN MOUTH\"    Riluzole Difficulty breathing and Swelling    Sulfa Antibiotics      \"PT DOES NOT KNOW WHAT THE REACTION WAS\"    Topiramate Other (See Comments)     Frequent " urination       Current Medications:  Scheduled:  Current Facility-Administered Medications   Medication Dose Route Frequency Provider Last Rate Last Admin    acetaminophen (TYLENOL) tablet 650 mg  650 mg Oral Q4H PRN Wilner Parker MD   650 mg at 05/29/24 1259    albuterol (PROVENTIL HFA/VENTOLIN HFA) inhaler  2 puff Inhalation Q6H PRN Wilner Parker MD   2 puff at 05/25/24 0050    allopurinol (ZYLOPRIM) tablet 300 mg  300 mg Oral QPM Nallely Barber MD   300 mg at 05/30/24 2016    alum & mag hydroxide-simethicone (MAALOX) suspension 30 mL  30 mL Oral Q4H PRN Wilner Parker MD        ascorbic acid tablet 1,000 mg  1,000 mg Oral Daily Wilner Parker MD   1,000 mg at 05/30/24 0835    benzonatate (TESSALON) capsule 200 mg  200 mg Oral TID PRN Wilner Parker MD   200 mg at 05/21/24 2300    cholecalciferol (VITAMIN D3) capsule 250 mcg  250 mcg Oral Weekly Wilner Parker MD   250 mcg at 05/29/24 1300    cyanocobalamin (VITAMIN B-12) sublingual tablet 500 mcg  500 mcg Sublingual Daily Wilner Parker MD   500 mcg at 05/30/24 0834    ethacrynic acid (EDECRIN) half-tab 12.5 mg  12.5 mg Oral Daily Wilner Parker MD   12.5 mg at 05/30/24 0835    fluticasone-vilanterol (BREO ELLIPTA) 100-25 MCG/ACT inhaler 1 puff  1 puff Inhalation Daily Wilner Parker MD   1 puff at 05/30/24 0835    And    umeclidinium (INCRUSE ELLIPTA) 62.5 MCG/ACT inhaler 1 puff  1 puff Inhalation Daily Wilner Parker MD   1 puff at 05/30/24 0835    gabapentin (NEURONTIN) capsule 100 mg  100 mg Oral Q6H PRN Wilner Parker MD   100 mg at 05/30/24 1446    gabapentin (NEURONTIN) capsule 400 mg  400 mg Oral At Bedtime Erin Ferrera MD   400 mg at 05/29/24 2301    guaiFENesin (MUCINEX) 12 hr tablet 1,200 mg  1,200 mg Oral BID PRN Wilner Parker MD        guaiFENesin (MUCINEX) 12 hr tablet 600 mg  600 mg Oral BID Jamila Jason MD   600 mg at 05/30/24 2016    HOLD MEDICATION   Does not apply HOLD Britany,  DO Alvina        Hold Medications for ECT (select and list medications to be held)   Does not apply HOLD Tejinder Correa MD        Hold Medications for ECT (select and list medications to be held)   Does not apply HOLD Boo Riley MD        ibuprofen (ADVIL/MOTRIN) tablet 400 mg  400 mg Oral Q6H PRN Alvina Garg DO        Lidocaine (LIDOCARE) 4 % Patch 1 patch  1 patch Transdermal Q24H Lulu Zacarias MD   1 patch at 05/30/24 0835    magnesium oxide (MAG-OX) tablet 400 mg  400 mg Oral BID Anna Brewer PA-C   400 mg at 05/30/24 2016    melatonin tablet 3 mg  3 mg Oral At Bedtime PRN Wilner Parker MD   3 mg at 05/30/24 2223    menthol (Topical Analgesic) 2.5% (BENGAY VANISHING SCENT) 2.5 % topical gel   Topical Q6H PRN Anna Brewer PA-C   Given at 05/29/24 1957    naloxone (NARCAN) injection 0.2 mg  0.2 mg Intravenous Q2 Min PRN Wilner Parker MD        Or    naloxone (NARCAN) injection 0.4 mg  0.4 mg Intravenous Q2 Min PRN Wilner Parker MD        Or    naloxone (NARCAN) injection 0.2 mg  0.2 mg Intramuscular Q2 Min PRN Wilner Parker MD        Or    naloxone (NARCAN) injection 0.4 mg  0.4 mg Intramuscular Q2 Min PRN Wilner Parker MD        OLANZapine (zyPREXA) tablet 2.5 mg  2.5 mg Oral TID PRN Wilner Parker MD        Or    OLANZapine (zyPREXA) injection 2.5 mg  2.5 mg Intramuscular TID PRN Wilner Parker MD        ondansetron (ZOFRAN ODT) ODT tab 4 mg  4 mg Oral Q6H PRN Wilner Parker MD        oxyCODONE (ROXICODONE) tablet 5 mg  5 mg Oral Q6H Nallely Barber MD   5 mg at 05/31/24 0029    And    oxyCODONE (ROXICODONE) tablet 5 mg  5 mg Oral Daily PRN Nallely Barber MD   5 mg at 05/31/24 2123    pantoprazole (PROTONIX) EC tablet 40 mg  40 mg Oral Daily Wilner Parker MD   40 mg at 05/30/24 0835    polyethylene glycol (MIRALAX) Packet 17 g  17 g Oral Daily PRN Wilner Parker MD        potassium chloride jeannette ER (KLOR-CON M20) CR tablet 20  mEq  20 mEq Oral Daily Wilner Parker MD   20 mEq at 05/30/24 0835    rOPINIRole (REQUIP) tablet 2 mg  2 mg Oral TID Nallely Barber MD   2 mg at 05/30/24 2223    sodium chloride (OCEAN) 0.65 % nasal spray 1 spray  1 spray Both Nostrils Q1H PRN Anna Brewer PA-C        SYNTHROID (Brand only - 75 mcg strength tablets)  150 mcg Oral Once per day on Monday Tuesday Wednesday Thursday Friday Saturday Tejinder Correa MD   150 mcg at 05/30/24 0713    And    SYNTHROID tablet 75 mcg (brand only)  75 mcg Oral Every Sunday Tejinder Correa MD   75 mcg at 05/26/24 0620    zolpidem (AMBIEN) tablet 5 mg  5 mg Oral At Bedtime PRN Wilner Parker MD   5 mg at 05/24/24 0056       PRN:  Current Facility-Administered Medications   Medication Dose Route Frequency Provider Last Rate Last Admin    acetaminophen (TYLENOL) tablet 650 mg  650 mg Oral Q4H PRN Wilner Parker MD   650 mg at 05/29/24 1259    albuterol (PROVENTIL HFA/VENTOLIN HFA) inhaler  2 puff Inhalation Q6H PRN Wilner Parker MD   2 puff at 05/25/24 0050    allopurinol (ZYLOPRIM) tablet 300 mg  300 mg Oral QPM Nallely Barber MD   300 mg at 05/30/24 2016    alum & mag hydroxide-simethicone (MAALOX) suspension 30 mL  30 mL Oral Q4H PRN Wilner Parker MD        ascorbic acid tablet 1,000 mg  1,000 mg Oral Daily Wilner Parker MD   1,000 mg at 05/30/24 0835    benzonatate (TESSALON) capsule 200 mg  200 mg Oral TID PRN Wilner Parker MD   200 mg at 05/21/24 2300    cholecalciferol (VITAMIN D3) capsule 250 mcg  250 mcg Oral Weekly Wilner Parker MD   250 mcg at 05/29/24 1300    cyanocobalamin (VITAMIN B-12) sublingual tablet 500 mcg  500 mcg Sublingual Daily Wilner Parker MD   500 mcg at 05/30/24 0834    ethacrynic acid (EDECRIN) half-tab 12.5 mg  12.5 mg Oral Daily Wilner Parker MD   12.5 mg at 05/30/24 0835    fluticasone-vilanterol (BREO ELLIPTA) 100-25 MCG/ACT inhaler 1 puff  1 puff Inhalation Daily Wilner Parker MD   1  puff at 05/30/24 0835    And    umeclidinium (INCRUSE ELLIPTA) 62.5 MCG/ACT inhaler 1 puff  1 puff Inhalation Daily Wilner Parker MD   1 puff at 05/30/24 0835    gabapentin (NEURONTIN) capsule 100 mg  100 mg Oral Q6H PRN Wilner Parker MD   100 mg at 05/30/24 1446    gabapentin (NEURONTIN) capsule 400 mg  400 mg Oral At Bedtime Erin Ferrera MD   400 mg at 05/29/24 2301    guaiFENesin (MUCINEX) 12 hr tablet 1,200 mg  1,200 mg Oral BID PRN Wilner Parker MD        guaiFENesin (MUCINEX) 12 hr tablet 600 mg  600 mg Oral BID Jamila Jason MD   600 mg at 05/30/24 2016    HOLD MEDICATION   Does not apply HOLD Alvina Garg DO        Hold Medications for ECT (select and list medications to be held)   Does not apply HOLD Tejinder Correa MD        Hold Medications for ECT (select and list medications to be held)   Does not apply HOLD Boo Riley MD        ibuprofen (ADVIL/MOTRIN) tablet 400 mg  400 mg Oral Q6H PRN Alvina Garg DO        Lidocaine (LIDOCARE) 4 % Patch 1 patch  1 patch Transdermal Q24H Lulu Zacarias MD   1 patch at 05/30/24 0835    magnesium oxide (MAG-OX) tablet 400 mg  400 mg Oral BID Anna Brewer PA-C   400 mg at 05/30/24 2016    melatonin tablet 3 mg  3 mg Oral At Bedtime PRN Wilner Parker MD   3 mg at 05/30/24 2223    menthol (Topical Analgesic) 2.5% (BENGAY VANISHING SCENT) 2.5 % topical gel   Topical Q6H PRN Anna Brewer PA-C   Given at 05/29/24 1957    naloxone (NARCAN) injection 0.2 mg  0.2 mg Intravenous Q2 Min PRN Wilner Parker MD        Or    naloxone (NARCAN) injection 0.4 mg  0.4 mg Intravenous Q2 Min PRN Wilner Parker MD        Or    naloxone (NARCAN) injection 0.2 mg  0.2 mg Intramuscular Q2 Min PRN Wilner Parker MD        Or    naloxone (NARCAN) injection 0.4 mg  0.4 mg Intramuscular Q2 Min PRN Wilner Parker MD        OLANZapine (zyPREXA) tablet 2.5 mg  2.5 mg Oral TID PRN Wilner Parker MD        Or     OLANZapine (zyPREXA) injection 2.5 mg  2.5 mg Intramuscular TID PRN Wilner Parker MD        ondansetron (ZOFRAN ODT) ODT tab 4 mg  4 mg Oral Q6H PRN Wilner Parker MD        oxyCODONE (ROXICODONE) tablet 5 mg  5 mg Oral Q6H Nallely Barber MD   5 mg at 05/31/24 0029    And    oxyCODONE (ROXICODONE) tablet 5 mg  5 mg Oral Daily PRN Nallely Barber MD   5 mg at 05/31/24 0413    pantoprazole (PROTONIX) EC tablet 40 mg  40 mg Oral Daily Wilner Parker MD   40 mg at 05/30/24 0835    polyethylene glycol (MIRALAX) Packet 17 g  17 g Oral Daily PRN Wilner Parker MD        potassium chloride jeannette ER (KLOR-CON M20) CR tablet 20 mEq  20 mEq Oral Daily Wilner Parker MD   20 mEq at 05/30/24 0835    rOPINIRole (REQUIP) tablet 2 mg  2 mg Oral TID Nallely Barber MD   2 mg at 05/30/24 2223    sodium chloride (OCEAN) 0.65 % nasal spray 1 spray  1 spray Both Nostrils Q1H PRN Anna Brewer PA-C        SYNTHROID (Brand only - 75 mcg strength tablets)  150 mcg Oral Once per day on Monday Tuesday Wednesday Thursday Friday Saturday Tejinder Correa MD   150 mcg at 05/30/24 0713    And    SYNTHROID tablet 75 mcg (brand only)  75 mcg Oral Every Sunday Tejinder Correa MD   75 mcg at 05/26/24 0620    zolpidem (AMBIEN) tablet 5 mg  5 mg Oral At Bedtime PRN Wilner Parker MD   5 mg at 05/24/24 0056       Labs and Imaging:  New results:   No results found for this or any previous visit (from the past 24 hour(s)).      Data this admission:  - CBC elevated Hgb 17.7  - CMP unremarkable  - TSH normal  - UDS THC positive  - Hgb A1c normal  - Lipids LDL mildly elevated 103 but otherwise unremarkable  - Vitamin B12 unremarkable  - Folate unremarkable  - Vitamin D unremarkable  - Urinalysis unremarkable  - EKG normal sinus rhythm, RBBB QTc 458    Potential Drug Interactions:   Per [employer], combinations of the following drugs has a rating of D, demonstrating that the medications may interact with each other in a  "clinically significant manner and a patient-specific assessment was made and determined that the benefits outweighed the risks.   - Gabapentin, oxycodone, and Magnesium oxide The following risks include CNS depression.   - Gabapentin and levothyroxine. The following risks include magnesium salts decreasing serum concentration of levothyroxine.     Per [employer], combinations of the following drugs has a rating of C demonstrating that agents may interact in a clinically significant manner but the benefits of the combination of medications often outweigh the risk.   - Ropinirole, oxycodone, and gabapentin. The following risks include CNS depression.   - allopurinol and ethacrynic acid. The following risks include ethacrynic acid increasing concentration of allopurinol.  - ethacrynic acid and oxycodone.  The following risks include oxycodone may enhanced the toxic effects of ethacrynic acid  Treatment team will monitor  for potential side effects and re-evaluate as needed.           Mental Status Exam:     Oriented to:  Grossly Oriented  General:  Awake and Alert  Appearance:  appears stated age and Grooming is adequate  Behavior/Attitude:  cooperative and open  Eye Contact:  appropriate  Psychomotor: normal and no evidence of tics, dystonia, or tardive dyskinesia no catatonia present  Speech:  appropriate volume/tone and talkative  Language: Fluent in English with appropriate syntax and vocabulary.  Mood:  \"  \"  Affect:  congruent with mood, labile, tearful, and anxious  Thought Process:  coherent, goal directed, and circumstantial  Thought Content:  suicidal ideation (passive), No HI, No VH, and No AH; No apparent delusions  Associations:  intact  Insight:  fair due to recognizing the circumstances of her decompensation.  Judgment:  fair due to willingness to engage in ECT and better her MH  Impulse control: fair  Attention Span:  grossly intact  Concentration:  grossly intact  Recent and Remote Memory:  not formally " assessed  Fund of Knowledge:  average  Muscle Strength and Tone: normal  Gait and Station: Normal and notes difficulty with gait     Psychiatric Assessment     Randi Cleary is a 68 year old female previously diagnosed with MDD, recurrent severe, substance induced mood disorder, Unspecified Trauma, CHAVO, borderline personality disorder and alcohol use disorder who presented voluntarily with SI in the context of treatment resistant MDD. On admission she presented with significant symptoms of depression incuding emotional lability, low mood, hopelessness, amotivation, anxiety, anhedonia, low energy, insomnia low appetite, excess guilt and poor concentration. Her affect was dysphroic anjd axious at times with heavy perseveration on medico-surgical dignosises and passive SI causing incresed anxiety. She notes she has had a plan but no intent. Her history of extensive substance use, medical history and chronic pain contribute biologically. Her presentation is further complicated by borderline personality disorder and history of trauma. Additionally, she has a very limited support system, interpersonal conflicts with friends and her spouse. However her last hospitalization was in the 1980's for a suicide attempt via prozac overdose and has been engaged in treatment and present voluntarily. Her presentation is consistent with decompensated Major Depressive disorder, recurrent severe. Her disorder has been treatment resistant including to TMS and at this time, ECT would be the best course of action to address her symptom severity. Patient warrants inpatient psychiatric hospitalization to maintain her safety.      Psychiatric Plan by Diagnosis      Today's changes:  - none     # MDD, recurrent, severe, with anxious distress  - Patient suffers from treatment resistant depression and has trialed many medications. At this point treatment team will need to do a chart review to review medication trials and determine the best  medication to address depressive symptoms long term  - Pt will start ECT 5/24 : Hold high-dose gabapentin/pregabalin after 6pm on the day before ECT (to permit adequate seizure)   - Next ECT date: 5/29  - ECT sessions completed: 1     #Insomnia  - Zolpidem 5mg qpm prn    Pertinent Labs/Monitoring:   - EKG 5/22 - Qtc 458 NSR, RBBB     Additional Plans:  - Patient will be treated in therapeutic milieu with appropriate individual and group therapies as described     Psychiatric Hospital Course:      Randi Cleary was admitted to [facility] as a voluntary patient. ECT and Medicine consult was placed in order to get the patient cleared for an acute series of ECT. Plan for a Right Unilateral ultra-brief pulse given the patient's age and concern for memory and cognitive deficits.     The risks, benefits, alternatives, and side effects were discussed and understood by the patient     Medical Assessment and Plan     Medical diagnoses to be addressed this admission:    # Hypothyroidism  - PTA 150mcg M-Sa, 75mcg on Perez     # KYAW   - Doesn't use CPAP at home, sleep study on 06/2024      # RLS  - Continue PTA Ropinirole 2 mg PO TID  - Gabapentin 400mg qpm  - Magnesium Citrated changed to Magnesium Oxide 400mg BID (per patient and medicine)    #RBBB:  Previously on other EKGs    #Cervical radiculopathy and myelopathy s/p cervical laminoplasty 12/2021    # Chronic Pain  - Continue PTA Roxicodone 5 mg q6h + 5 mg PRN (for max daily dose of 25 mg)  - Menthol 2.5% topical gel q6hrs prn  - TENS Unit     #Dizziness  #Headache  Reports hx of migraines, no vision changes or hearing changes. notes this has been ongoing since she had her spinal surgery.   - CT head 5/22 unremarkable    #Gout  - Nutrition consult  - Allopurinol 300mg qpm  - Flares in left podagra but none currently    #Vitamin B12 Deficiency  - Vit B12 500mcg qday    #Vitamin C Deficiency  - Ascorbic Acid 1000mg    #Vitamin D Deficiency  - 250mcg weekly    #COPD  - Breo  Ellipta 1 puff daily   - Incruse Ellipta 1 puff daily  - Tessalon cap 200mg TID prn  - Albuterol 2 puffs q6hr prn  - Mucinex 1200mg BID prn     #GERD  #Hiatal Hernia  - Protonix 40mg daily    #Possible pulmonary HTN  #HTN  Per Medicine note, pt follows with Cardiology here and has scheduled right heart catheterization for 06/12/2024. Has follow up with Cardiology in clinic scheduled for 06/19/2024. PTA edecrin 12.5mg daily and potassium supplement. Electrolytes and renal function stable.Recently lost 50lbs and BP has improved.   - Ethacrynic Acid 12.5mg qday   -Hold Edecrin on date of ECT, continue 12.5mg daily for now  -Continue potassium supplement   - Klorcon 20meq daily     #Hemochromatosis, hereditary  - Hgb Stable at ~17    #Hepatic Steatosis  - Stable  - LFTs wnl, no abdominal pain, distention, N/V    #History of breast cancer s/p mastectomy, chemo and XRT:   - currently in remission      #Alcohol use disorder, in remission:   - No current use. Two years sober     #Hx of pheochromocytoma s/p left adrenalectomy 08/2017:   - Stable  - Follows Endocrinology for this.     #Psoriasis:  - Noted on R hang  - Uses scalp brush    Medical course: Patient was physically examined by the ED prior to being transferred to the unit and was found to be medically stable and appropriate for admission. Medicine consult was done to provide clearance for ECT. Due to patient's persistent neck pain after surgery in 2021, CT Head was done which was negative. Oxycodone changed from q6hr prn to scheduled with an extra 5mg in the day as needed per PTA regimen. Patient continued to express some nerve pain so gabapentin was increased to 400mg.      Consults:  none    Medical course: Patient was physically examined by the ED prior to being transferred to the unit and was found to be medically stable and appropriate for admission.     Consults: Medicine for ECT Clearance, ECT Consult, Nutrition     Checklist     Legal Status: Voluntary      Safety Assessment:   Behavioral Orders   Procedures    Code 1 - Restrict to Unit    Code 2 - 1:1 Staff Supervision     For coming down to ECT only    Discontinue 1:1 attendant for suicide risk     Order Specific Question:   I have performed an in person assessment of the patient     Answer:   Based on this assessment the patient no longer requires a one on one attendant at this point in time.     Order Specific Question:   Rationale     Answer:   Patient States able to remain safe in hospital     Order Specific Question:   Rationale     Answer:   Modifications to care environment made to mitigate safety risk     Order Specific Question:   Rationale     Answer:   Routine observations are sufficient to monitor safety.    Electroconvulsive therapy     Series of up to 12 treatments. Begin Date: 5/24/24     Treating Psychiatrist providing ECT:  Ruthann     Notified on:  5/21/24    Electroconvulsive therapy     Series of up to 12 treatments. Begin Date: 5/24/24     Treating Psychiatrist providing ECT:  Ruthann     Notified on:  5/21/24    Fall precautions    Routine Programming     As clinically indicated    Status 15     Every 15 minutes.    Suicide precautions: Suicide Risk: MODERATE; Clinical rationale to override score: lack of access to a plan for self-harm     Order Specific Question:   Suicide Risk     Answer:   MODERATE     Order Specific Question:   Clinical rationale to override score:     Answer:   lack of access to a plan for self-harm       Risk Assessment:  Risk for harm is moderate-high.  Risk factors: SI, maladaptive coping, trauma, and past behaviors  Protective factors: engaged in treatment     SIO: Discontinued    Disposition: Pending stabilization, medication optimization, & development of a safe discharge plan.     Attestations   Reina Vallecillo, MS3     I saw and evaluated the patient. I personally spent a total of 55 minutes on care for this patient on the date of the encounter. This includes  face-to-face as well as non-face-to-face time reviewing the chart, lab review, and coordination of care.     Faustino Beckett MD   Chief of Mental Health & Addiction Service Line

## 2024-05-31 NOTE — ANESTHESIA CARE TRANSFER NOTE
Patient: Randi Cleary    Procedure: * No procedures listed *       Diagnosis: * No pre-op diagnosis entered *  Diagnosis Additional Information: No value filed.    Anesthesia Type:   General     Note:    Oropharynx: spontaneously breathing  Level of Consciousness: drowsy        Dentition: dentition unchanged  Vital Signs Stable: post-procedure vital signs reviewed and stable    Patient transferred to: PACU    Handoff Report: Identifed the Patient, Identified the Reponsible Provider, Reviewed the pertinent medical history, Discussed the surgical course, Reviewed Intra-OP anesthesia mangement and issues during anesthesia, Set expectations for post-procedure period and Allowed opportunity for questions and acknowledgement of understanding      Vitals:  Vitals Value Taken Time   BP     Temp     Pulse     Resp     SpO2         Electronically Signed By: Carmen Saldana MD  May 31, 2024  2:08 PM

## 2024-05-31 NOTE — PROCEDURES
"Procedures  [facility], [place]   ECT Procedure Note   05/31/2024    Randi Cleary is a 68 year old female patient.  [id number]    Patient Status: IN-patient    Is this the first in a series of 12 treatments?  No      Allergies   Allergen Reactions    Serotonin Reuptake Inhibitors (Ssris) Anxiety, Difficulty breathing, Headache, Palpitations and Shortness Of Breath    Buspirone      The patient states she had serotonin syndrome    Cephalexin      Other reaction(s): unknown rxn.    Desvenlafaxine      Serotonin syndrome    Diclofenac Sodium [Diclofenac]      Serotonin syndrome and restless legs syndrome    Gabapentin      Drove on the wrong side of the highway    Levofloxacin      \"CAN'T REMEMBER\"    Penicillins      \"SORES IN MOUTH\"    Riluzole Difficulty breathing and Swelling    Sulfa Antibiotics      \"PT DOES NOT KNOW WHAT THE REACTION WAS\"    Topiramate Other (See Comments)     Frequent urination       Weight:  193 lbs 0 oz / 87 kg          Indications for ECT:   Medications ineffective and Psychotherapies ineffective         Clinical Narrative:   HPI - The patient describes a lifelong history of depression dating back to elementary school, worsening after her father's death when she was 15yo and especially in the 1980s in the setting of worsening physical health (since falling and breaking her ankle), which led to the the initiation of fluoxetine, her first antidepressant.  Her history has been primarily characterized by low mood, with some ups and downs but no extended depression-free periods since then.  She has tried many different medications from different classes, but cannot recall any one being particularly helpful for her.  She has experienced past episodes of particularly irritable mood and concomitant decreased sleep need, although most of these have lasted 1-2 days and triggered by anger related to external stressors.  She has at least one episode of a more extended " "episode of elevated mood (and associated grandiosity, increased spending, flight of ideas, increased goal directed behaviors, pressured speech, and odd and embarrassing behavior), which occurred in the context of relapse on alcohol in 2021. She does not endorse any events before about age 48.     The patient's depression is characterized by near daily low mood, frequent anhedonia, with sleep difficulties (although c/b pain, KYAW, and RLS), fatigue, feelings of guilt/worthlessness, and impaired concentration.  She reports feelings of \"despair,\" at times with active SI with plan, but denies any intent to act on this - \"maybe it's hope.\"  She has 1 lifetime suicide attempt (overdose) in the 1980s, for which she was psychiatrically hospitalized.  She has a remote history of SIB (cutting) but not recently.       Psych pertinent item history includes includes suicide attempt , suicidal ideation, SIB , aggression, trauma hx, substance use: alcohol, cannabis, and Patient has a history of alcohol dependence treated 20+ years ago and she relapsed in 2020 for a couple of months, in her 20s she\" loved getting high\" on cocaine, LSD, mushrooms, speed, white cross, mutiple psychotropic trials , psych hosp, ketamine, and major medical problems.    Target Symptoms for Improvement: Amotivation, Fatigue, Improved self-image and Panic attacks         Diagnosis:   Major depression         Assessment:   #1 05/24/24 Some circumstantial thought, needs some redirection, 3.5 hours sleep last night, anxious, mood 1/10, PDW but no SI, never had ECT before - only TMS. No AVH.   #2 05/29/24  Mood 4/10, better over w/e, some word find difficulties, no AVH/SI/PDW. Chronic sciatica pain, neck and shoulder pain - didn't worsen with ECT. Mild headache.   #3 05/31/24  Mood 4/10, No SI, PDW, AVH, some wrist pain and headache, memory might be improving.           Pause for the Cause:     Correct patient Yes   Correct procedure/laterality settings: Yes  "          Intra-Procedure Documentation:     ECT #: 3   Treatment number this series: 3   Total treatment number: 3     Type of ECT:  Right, unilateral ultrabrief    ECT Medications:    Toradol 30mg iv - for headache/myalgia     Brevital: 100 mg (incr from 90 mg as still awake)   Succinyl Choline: 90 mg    BP - nicardipine 1500 mcg     Versed 2mg iv - for given sux before Brevital 5/31    ECT Strip Summary: RUL Titration #3: 38.4 mC   Energy Level:  230.4 mC, 0.3 ms, 60 Hz, 8 sec, 800 mA     Motor Seizure Duration: 17 seconds  EEG Seizure Duration: 28 seconds    Complications: Succinyl choline given before Brevital 5/31 - patient showed signs of distress.     Plan:   - Continue RUL ECT - Q MWF    - Monitor depression severity with clinical assessment augmented with PHQ9 every other treatment  - Continue current medications    Discharge instructions:   - Continue acute ECT    - Per Dr Varela:   - Consider trial of serotonin modulator (e.g., vilazodone vs. vortioxetine) or novel agent Auvelity  - Consider augmentation with atypical antipsychotic (e.g., quetiapine or aripiprazole), though there are concerns given pre-diabetes      Dominique Beavers MD  Dept of Psychiatry

## 2024-05-31 NOTE — PLAN OF CARE
"Pt had ECT today. She complained of being stiff and having wrist pain. Scheduled medication given to pt. She noted that she remembers better this time as compared to the previous one. Pt is pleasant and cooperative. She endorsed anxiety  and PRN gabapentin was administered. Mid afternoon, pt was tearful and  she complained of pain. She was given Tylenol. Pt stressed about her missing the scheduled pain medication in the morning and was instead given the PRN pain medication which she thinks she would need a little later. Pt missed her breakfast and did not like to eat her lunch. Pt was provided a lot of reassurance and was encouraged  to come out of her room so she can distract herself. Pt then stayed at the [facility] area and watched TV.  Pt observed to be interacting with peer.     Problem: Adult Inpatient Plan of Care  Goal: Optimal Comfort and Wellbeing  Outcome: Not Progressing  Intervention: Provide Person-Centered Care  Recent Flowsheet Documentation  Taken 5/31/2024 1400 by Yoselin Garcia RN  Trust Relationship/Rapport:   care explained   choices provided   emotional support provided   empathic listening provided   questions answered   reassurance provided   thoughts/feelings acknowledged     Problem: Adult Inpatient Plan of Care  Goal: Patient-Specific Goal (Individualized)  Description: You can add care plan individualizations to a care plan. Examples of Individualization might be:  \"Parent requests to be called daily at 9am for status\", \"I have a hard time hearing out of my right ear\", or \"Do not touch me to wake me up as it startles  me\".  Outcome: Progressing  Goal: Absence of Hospital-Acquired Illness or Injury  Outcome: Progressing     Problem: Adult Behavioral Health Plan of Care  Goal: Adheres to Safety Considerations for Self and Others  Outcome: Progressing  Intervention: Develop and Maintain Individualized Safety Plan  Recent Flowsheet Documentation  Taken 5/31/2024 1400 by Yoselin Garcia " Adwoa GOODWIN RN  Safety Measures:   safety rounds completed   environmental rounds completed  Goal: Absence of New-Onset Illness or Injury  Outcome: Progressing  Intervention: Identify and Manage Fall Risk  Recent Flowsheet Documentation  Taken 5/31/2024 1400 by Yoselin Garcia RN  Safety Measures:   safety rounds completed   environmental rounds completed  Goal: Optimized Coping Skills in Response to Life Stressors  Outcome: Progressing  Intervention: Promote Effective Coping Strategies  Recent Flowsheet Documentation  Taken 5/31/2024 1400 by Yoselin Garcia RN  Supportive Measures:   active listening utilized   self-care encouraged   self-reflection promoted   self-responsibility promoted   verbalization of feelings encouraged     Problem: Suicide Risk  Goal: Absence of Self-Harm  Outcome: Progressing  Intervention: Assess Risk to Self and Maintain Safety  Recent Flowsheet Documentation  Taken 5/31/2024 1400 by Yoselin Garcia RN  Behavior Management: behavioral plan reviewed  Self-Harm Prevention: environmental self-harm risks assessed  Intervention: Promote Psychosocial Wellbeing  Recent Flowsheet Documentation  Taken 5/31/2024 1400 by Yoselin Garcia RN  Supportive Measures:   active listening utilized   self-care encouraged   self-reflection promoted   self-responsibility promoted   verbalization of feelings encouraged  Family/Support System Care:   involvement promoted   presence promoted  Intervention: Establish Safety Plan and Continuity of Care  Recent Flowsheet Documentation  Taken 5/31/2024 1400 by Yoselin Garcia RN  Safe Transition Promotion: protective factors promoted     Problem: Sleep Disturbance  Goal: Adequate Sleep/Rest  Outcome: Progressing   Goal Outcome Evaluation:    Plan of Care Reviewed With: patient

## 2024-05-31 NOTE — PLAN OF CARE
At [facility] 0425, pt did c/o shoulder pain rating it  at 5/10 - Requested and given Oxycodone 5 mg as ordered - was helpful for a short while.  Denied SI/AH/VH but did endorsed depression 7/10 and anxiety 4/10.  Pt having ECT this morning - NPO as from mid night.  Pt refused to take the Scheduled 6 am Oxycodone 5 mg - wants to take after having ECT completed;  took PRN Tylenol 650 mg instead. Pt appears to have slept for  5.75 hours; will continue to monitor and offer support.      Problem: Sleep Disturbance  Goal: Adequate Sleep/Rest  Outcome: Progressing  Goal Outcome Evaluation:

## 2024-05-31 NOTE — PROGRESS NOTES
"  ----------------------------------------------------------------------------------------------------------  [facility]  Psychiatry Progress Note  Hospital Day #10     Interim History:     The patient's care was discussed with the treatment team and chart notes were reviewed.  Sleep: 5.75 hours (05/31/24 0700)  Staff Report:  No acute events.        Subjective:     Completed her third ECT. Other than minor pain, no other issues.     ROS:  Patient has pain in her neck and shoulder.  Patient denies acute concerns     Objective:     Vitals:  /53   Pulse 55   Temp 98.3  F (36.8  C) (Oral)   Resp 16   Ht 1.676 m (5' 6\")   Wt 87.5 kg (193 lb)   LMP  (LMP Unknown)   SpO2 93%   BMI 31.15 kg/m      Allergies:  Allergies   Allergen Reactions    Serotonin Reuptake Inhibitors (Ssris) Anxiety, Difficulty breathing, Headache, Palpitations and Shortness Of Breath    Buspirone      The patient states she had serotonin syndrome    Cephalexin      Other reaction(s): unknown rxn.    Desvenlafaxine      Serotonin syndrome    Diclofenac Sodium [Diclofenac]      Serotonin syndrome and restless legs syndrome    Gabapentin      Drove on the wrong side of the highway    Levofloxacin      \"CAN'T REMEMBER\"    Penicillins      \"SORES IN MOUTH\"    Riluzole Difficulty breathing and Swelling    Sulfa Antibiotics      \"PT DOES NOT KNOW WHAT THE REACTION WAS\"    Topiramate Other (See Comments)     Frequent urination       Current Medications:  Scheduled:  Current Facility-Administered Medications   Medication Dose Route Frequency Provider Last Rate Last Admin    acetaminophen (TYLENOL) tablet 650 mg  650 mg Oral Q4H PRN Wilner Parker MD   650 mg at 05/31/24 0640    albuterol (PROVENTIL HFA/VENTOLIN HFA) inhaler  2 puff Inhalation Q6H PRN Wilner Parker MD   2 puff at 05/25/24 0050    allopurinol (ZYLOPRIM) tablet 300 mg  300 mg Oral QPM Nallely Barber MD   300 mg at 05/30/24 " 2016    alum & mag hydroxide-simethicone (MAALOX) suspension 30 mL  30 mL Oral Q4H PRN Wilner Parker MD        ascorbic acid tablet 1,000 mg  1,000 mg Oral Daily Wilner Parker MD   1,000 mg at 05/31/24 1040    benzonatate (TESSALON) capsule 200 mg  200 mg Oral TID PRN Wilner Parker MD   200 mg at 05/21/24 2300    cholecalciferol (VITAMIN D3) capsule 250 mcg  250 mcg Oral Weekly Wilner Parker MD   250 mcg at 05/29/24 1300    cyanocobalamin (VITAMIN B-12) sublingual tablet 500 mcg  500 mcg Sublingual Daily Wilner Parker MD   500 mcg at 05/31/24 1040    ethacrynic acid (EDECRIN) half-tab 12.5 mg  12.5 mg Oral Daily Wilner Parker MD   12.5 mg at 05/31/24 1040    fluticasone-vilanterol (BREO ELLIPTA) 100-25 MCG/ACT inhaler 1 puff  1 puff Inhalation Daily Wilner Parker MD   1 puff at 05/31/24 0755    And    umeclidinium (INCRUSE ELLIPTA) 62.5 MCG/ACT inhaler 1 puff  1 puff Inhalation Daily Wilner Parker MD   1 puff at 05/31/24 0756    gabapentin (NEURONTIN) capsule 100 mg  100 mg Oral Q6H PRN Wilner Parker MD   100 mg at 05/30/24 1446    gabapentin (NEURONTIN) capsule 400 mg  400 mg Oral At Bedtime Erin Ferrera MD   400 mg at 05/29/24 2301    guaiFENesin (MUCINEX) 12 hr tablet 1,200 mg  1,200 mg Oral BID PRN Wilner Parker MD        guaiFENesin (MUCINEX) 12 hr tablet 600 mg  600 mg Oral BID Jamila Jason MD   600 mg at 05/31/24 1040    HOLD MEDICATION   Does not apply HOLD Alvina Garg,         Hold Medications for ECT (select and list medications to be held)   Does not apply HOLD Tejinder Correa MD        Hold Medications for ECT (select and list medications to be held)   Does not apply HOLD Boo Riley MD        ibuprofen (ADVIL/MOTRIN) tablet 400 mg  400 mg Oral Q6H PRN Alvina Garg DO        Lidocaine (LIDOCARE) 4 % Patch 1 patch  1 patch Transdermal Q24H Lulu Zacarias MD   1 patch at 05/30/24 0835    magnesium oxide (MAG-OX) tablet  400 mg  400 mg Oral BID Anna Brewer PA-C   400 mg at 05/31/24 1040    melatonin tablet 3 mg  3 mg Oral At Bedtime PRN Wilner Parker MD   3 mg at 05/30/24 2223    menthol (Topical Analgesic) 2.5% (BENGAY VANISHING SCENT) 2.5 % topical gel   Topical Q6H PRN Anna Brewer PA-C   Given at 05/29/24 1957    naloxone (NARCAN) injection 0.2 mg  0.2 mg Intravenous Q2 Min PRN Wilner Parker MD        Or    naloxone (NARCAN) injection 0.4 mg  0.4 mg Intravenous Q2 Min PRN Wilner Parker MD        Or    naloxone (NARCAN) injection 0.2 mg  0.2 mg Intramuscular Q2 Min PRN Wilner Parker MD        Or    naloxone (NARCAN) injection 0.4 mg  0.4 mg Intramuscular Q2 Min PRN Wilner Parker MD        OLANZapine (zyPREXA) tablet 2.5 mg  2.5 mg Oral TID PRN Wilner Parker MD        Or    OLANZapine (zyPREXA) injection 2.5 mg  2.5 mg Intramuscular TID PRN Wilner Parker MD        ondansetron (ZOFRAN ODT) ODT tab 4 mg  4 mg Oral Q6H PRN Wilner Parker MD        oxyCODONE (ROXICODONE) tablet 5 mg  5 mg Oral Q6H Nallely Barber MD   5 mg at 05/31/24 0029    And    oxyCODONE (ROXICODONE) tablet 5 mg  5 mg Oral Daily PRN Nallely Barber MD   5 mg at 05/31/24 0413    pantoprazole (PROTONIX) EC tablet 40 mg  40 mg Oral Daily Wilner Parker MD   40 mg at 05/31/24 1040    polyethylene glycol (MIRALAX) Packet 17 g  17 g Oral Daily PRN Wilner Parker MD        potassium chloride jeannette ER (KLOR-CON M20) CR tablet 20 mEq  20 mEq Oral Daily Wilner Parker MD   20 mEq at 05/31/24 1040    rOPINIRole (REQUIP) tablet 2 mg  2 mg Oral TID Nallely Barber MD   2 mg at 05/30/24 2223    sodium chloride (OCEAN) 0.65 % nasal spray 1 spray  1 spray Both Nostrils Q1H PRN Anna Brewer, BRISEYDA        SYNTHROID (Brand only - 75 mcg strength tablets)  150 mcg Oral Once per day on Monday Tuesday Wednesday Thursday Friday Saturday Tejinder Correa MD   150 mcg at 05/30/24 0713    And    SYNTHROID tablet 75 mcg  (brand only)  75 mcg Oral Every Sunday Tejinder Correa MD   75 mcg at 05/26/24 0620    zolpidem (AMBIEN) tablet 5 mg  5 mg Oral At Bedtime PRN Wilner Parker MD   5 mg at 05/24/24 0056       PRN:  Current Facility-Administered Medications   Medication Dose Route Frequency Provider Last Rate Last Admin    acetaminophen (TYLENOL) tablet 650 mg  650 mg Oral Q4H PRN Wilner Parker MD   650 mg at 05/31/24 0640    albuterol (PROVENTIL HFA/VENTOLIN HFA) inhaler  2 puff Inhalation Q6H PRN Wilner Parker MD   2 puff at 05/25/24 0050    allopurinol (ZYLOPRIM) tablet 300 mg  300 mg Oral QPM Nallely Barber MD   300 mg at 05/30/24 2016    alum & mag hydroxide-simethicone (MAALOX) suspension 30 mL  30 mL Oral Q4H PRN Wilner Parker MD        ascorbic acid tablet 1,000 mg  1,000 mg Oral Daily Wilner Parker MD   1,000 mg at 05/31/24 1040    benzonatate (TESSALON) capsule 200 mg  200 mg Oral TID PRN Wilner Parker MD   200 mg at 05/21/24 2300    cholecalciferol (VITAMIN D3) capsule 250 mcg  250 mcg Oral Weekly Wilner Parker MD   250 mcg at 05/29/24 1300    cyanocobalamin (VITAMIN B-12) sublingual tablet 500 mcg  500 mcg Sublingual Daily Wilner Parker MD   500 mcg at 05/31/24 1040    ethacrynic acid (EDECRIN) half-tab 12.5 mg  12.5 mg Oral Daily Wilnre Parker MD   12.5 mg at 05/31/24 1040    fluticasone-vilanterol (BREO ELLIPTA) 100-25 MCG/ACT inhaler 1 puff  1 puff Inhalation Daily Wilner Pakrer MD   1 puff at 05/31/24 0755    And    umeclidinium (INCRUSE ELLIPTA) 62.5 MCG/ACT inhaler 1 puff  1 puff Inhalation Daily Wilner Parker MD   1 puff at 05/31/24 0756    gabapentin (NEURONTIN) capsule 100 mg  100 mg Oral Q6H PRN Wilner Parker MD   100 mg at 05/30/24 1446    gabapentin (NEURONTIN) capsule 400 mg  400 mg Oral At Bedtime Erin Ferrera MD   400 mg at 05/29/24 2301    guaiFENesin (MUCINEX) 12 hr tablet 1,200 mg  1,200 mg Oral BID PRN Wilner Parker MD         guaiFENesin (MUCINEX) 12 hr tablet 600 mg  600 mg Oral BID Jamila Jason MD   600 mg at 05/31/24 1040    HOLD MEDICATION   Does not apply HOLD Alvina Garg DO        Hold Medications for ECT (select and list medications to be held)   Does not apply HOLD Tejinder Correa MD        Hold Medications for ECT (select and list medications to be held)   Does not apply HOLD Boo Riley MD        ibuprofen (ADVIL/MOTRIN) tablet 400 mg  400 mg Oral Q6H PRN Alvina Garg DO        Lidocaine (LIDOCARE) 4 % Patch 1 patch  1 patch Transdermal Q24H Lulu Zacarias MD   1 patch at 05/30/24 0835    magnesium oxide (MAG-OX) tablet 400 mg  400 mg Oral BID Anna Brewer PA-C   400 mg at 05/31/24 1040    melatonin tablet 3 mg  3 mg Oral At Bedtime PRN Wilner Parker MD   3 mg at 05/30/24 2223    menthol (Topical Analgesic) 2.5% (BENGAY VANISHING SCENT) 2.5 % topical gel   Topical Q6H PRN Anna Brewer PA-C   Given at 05/29/24 1957    naloxone (NARCAN) injection 0.2 mg  0.2 mg Intravenous Q2 Min PRN Wilner Parker MD        Or    naloxone (NARCAN) injection 0.4 mg  0.4 mg Intravenous Q2 Min PRN Wilner Parker MD        Or    naloxone (NARCAN) injection 0.2 mg  0.2 mg Intramuscular Q2 Min PRN Wilner Parker MD        Or    naloxone (NARCAN) injection 0.4 mg  0.4 mg Intramuscular Q2 Min PRN Wilner Parker MD        OLANZapine (zyPREXA) tablet 2.5 mg  2.5 mg Oral TID PRN Wilner Parker MD        Or    OLANZapine (zyPREXA) injection 2.5 mg  2.5 mg Intramuscular TID PRN Wilnre Parker MD        ondansetron (ZOFRAN ODT) ODT tab 4 mg  4 mg Oral Q6H PRN Wilner Parker MD        oxyCODONE (ROXICODONE) tablet 5 mg  5 mg Oral Q6H Nallely Barber MD   5 mg at 05/31/24 0029    And    oxyCODONE (ROXICODONE) tablet 5 mg  5 mg Oral Daily PRN Nallely Barber MD   5 mg at 05/31/24 0413    pantoprazole (PROTONIX) EC tablet 40 mg  40 mg Oral Daily Wilner Parker MD   40 mg at  05/31/24 1040    polyethylene glycol (MIRALAX) Packet 17 g  17 g Oral Daily PRN Wilner Parker MD        potassium chloride jeannette ER (KLOR-CON M20) CR tablet 20 mEq  20 mEq Oral Daily Wilner Parker MD   20 mEq at 05/31/24 1040    rOPINIRole (REQUIP) tablet 2 mg  2 mg Oral TID Nallely Barber MD   2 mg at 05/30/24 2223    sodium chloride (OCEAN) 0.65 % nasal spray 1 spray  1 spray Both Nostrils Q1H PRN Anna Brewer PA-C        SYNTHROID (Brand only - 75 mcg strength tablets)  150 mcg Oral Once per day on Monday Tuesday Wednesday Thursday Friday Saturday Tejinder Correa MD   150 mcg at 05/30/24 0713    And    SYNTHROID tablet 75 mcg (brand only)  75 mcg Oral Every Sunday Tejinder Correa MD   75 mcg at 05/26/24 0620    zolpidem (AMBIEN) tablet 5 mg  5 mg Oral At Bedtime PRN Wilner Parker MD   5 mg at 05/24/24 0056       Labs and Imaging:  New results:   No results found for this or any previous visit (from the past 24 hour(s)).      Data this admission:  - CBC elevated Hgb 17.7  - CMP unremarkable  - TSH normal  - UDS THC positive  - Hgb A1c normal  - Lipids LDL mildly elevated 103 but otherwise unremarkable  - Vitamin B12 unremarkable  - Folate unremarkable  - Vitamin D unremarkable  - Urinalysis unremarkable  - EKG normal sinus rhythm, RBBB QTc 458    Potential Drug Interactions:   Per [employer], combinations of the following drugs has a rating of D, demonstrating that the medications may interact with each other in a clinically significant manner and a patient-specific assessment was made and determined that the benefits outweighed the risks.   - Gabapentin, oxycodone, and Magnesium oxide The following risks include CNS depression.   - Gabapentin and levothyroxine. The following risks include magnesium salts decreasing serum concentration of levothyroxine.     Per [employer], combinations of the following drugs has a rating of C demonstrating that agents may interact in a clinically significant  "manner but the benefits of the combination of medications often outweigh the risk.   - Ropinirole, oxycodone, and gabapentin. The following risks include CNS depression.   - allopurinol and ethacrynic acid. The following risks include ethacrynic acid increasing concentration of allopurinol.  - ethacrynic acid and oxycodone.  The following risks include oxycodone may enhanced the toxic effects of ethacrynic acid  Treatment team will monitor  for potential side effects and re-evaluate as needed.           Mental Status Exam:     Oriented to:  Grossly Oriented  General:  Awake and Alert  Appearance:  appears stated age and Grooming is adequate  Behavior/Attitude:  cooperative and open  Eye Contact:  appropriate  Psychomotor: normal and no evidence of tics, dystonia, or tardive dyskinesia no catatonia present  Speech:  appropriate volume/tone and talkative  Language: Fluent in English with appropriate syntax and vocabulary.  Mood:  \"better\"   Affect:  congruent with mood and anxious  Thought Process:  coherent, goal directed, and circumstantial  Thought Content:  suicidal ideation (passive), No HI, No VH, and No AH; No apparent delusions  Associations:  intact  Insight:  fair due to recognizing the circumstances of her decompensation.  Judgment:  fair due to willingness to engage in ECT and better her MH  Impulse control: fair  Attention Span:  grossly intact  Concentration:  grossly intact  Recent and Remote Memory:  not formally assessed  Fund of Knowledge:  average  Muscle Strength and Tone: normal  Gait and Station: Normal and notes difficulty with gait     Psychiatric Assessment     Randi Cleary is a 68 year old female previously diagnosed with MDD, recurrent severe, substance induced mood disorder, Unspecified Trauma, CHAVO, borderline personality disorder and alcohol use disorder who presented voluntarily with SI in the context of treatment resistant MDD. On admission she presented with significant symptoms " of depression incuding emotional lability, low mood, hopelessness, amotivation, anxiety, anhedonia, low energy, insomnia low appetite, excess guilt and poor concentration. Her affect was dysphroic anjd axious at times with heavy perseveration on medico-surgical dignosises and passive SI causing incresed anxiety. She notes she has had a plan but no intent. Her history of extensive substance use, medical history and chronic pain contribute biologically. Her presentation is further complicated by borderline personality disorder and history of trauma. Additionally, she has a very limited support system, interpersonal conflicts with friends and her spouse. However her last hospitalization was in the 1980's for a suicide attempt via prozac overdose and has been engaged in treatment and present voluntarily. Her presentation is consistent with decompensated Major Depressive disorder, recurrent severe. Her disorder has been treatment resistant including to TMS and at this time, ECT would be the best course of action to address her symptom severity. Patient warrants inpatient psychiatric hospitalization to maintain her safety.      Psychiatric Plan by Diagnosis      Today's changes:  - none     # MDD, recurrent, severe, with anxious distress  - Patient suffers from treatment resistant depression and has trialed many medications. At this point treatment team will need to do a chart review to review medication trials and determine the best medication to address depressive symptoms long term  - Pt will start ECT 5/24 : Hold high-dose gabapentin/pregabalin after 6pm on the day before ECT (to permit adequate seizure)   - Cont ECT       #Insomnia  - Zolpidem 5mg qpm prn    Pertinent Labs/Monitoring:   - EKG 5/22 - Qtc 458 NSR, RBBB     Additional Plans:  - Patient will be treated in therapeutic milieu with appropriate individual and group therapies as described     Psychiatric Hospital Course:      Randi Cleary was admitted  to [facility] as a voluntary patient. ECT and Medicine consult was placed in order to get the patient cleared for an acute series of ECT. Plan for a Right Unilateral ultra-brief pulse given the patient's age and concern for memory and cognitive deficits.     The risks, benefits, alternatives, and side effects were discussed and understood by the patient     Medical Assessment and Plan     Medical diagnoses to be addressed this admission:    # Hypothyroidism  - PTA 150mcg M-Sa, 75mcg on Perez     # KYAW   - Doesn't use CPAP at home, sleep study on 06/2024      # RLS  - Continue PTA Ropinirole 2 mg PO TID  - Gabapentin 400mg qpm  - Magnesium Citrated changed to Magnesium Oxide 400mg BID (per patient and medicine)    #RBBB:  Previously on other EKGs    #Cervical radiculopathy and myelopathy s/p cervical laminoplasty 12/2021    # Chronic Pain  - Continue PTA Roxicodone 5 mg q6h + 5 mg PRN (for max daily dose of 25 mg)  - Menthol 2.5% topical gel q6hrs prn  - TENS Unit     #Dizziness  #Headache  Reports hx of migraines, no vision changes or hearing changes. notes this has been ongoing since she had her spinal surgery.   - CT head 5/22 unremarkable    #Gout  - Nutrition consult  - Allopurinol 300mg qpm  - Flares in left podagra but none currently    #Vitamin B12 Deficiency  - Vit B12 500mcg qday    #Vitamin C Deficiency  - Ascorbic Acid 1000mg    #Vitamin D Deficiency  - 250mcg weekly    #COPD  - Breo Ellipta 1 puff daily   - Incruse Ellipta 1 puff daily  - Tessalon cap 200mg TID prn  - Albuterol 2 puffs q6hr prn  - Mucinex 1200mg BID prn     #GERD  #Hiatal Hernia  - Protonix 40mg daily    #Possible pulmonary HTN  #HTN  Per Medicine note, pt follows with Cardiology here and has scheduled right heart catheterization for 06/12/2024. Has follow up with Cardiology in clinic scheduled for 06/19/2024. PTA edecrin 12.5mg daily and potassium supplement. Electrolytes and renal function stable.Recently lost 50lbs and BP has improved.    - Ethacrynic Acid 12.5mg qday   -Hold Edecrin on date of ECT, continue 12.5mg daily for now  -Continue potassium supplement   - Klorcon 20meq daily     #Hemochromatosis, hereditary  - Hgb Stable at ~17    #Hepatic Steatosis  - Stable  - LFTs wnl, no abdominal pain, distention, N/V    #History of breast cancer s/p mastectomy, chemo and XRT:   - currently in remission      #Alcohol use disorder, in remission:   - No current use. Two years sober     #Hx of pheochromocytoma s/p left adrenalectomy 08/2017:   - Stable  - Follows Endocrinology for this.     #Psoriasis:  - Noted on R hang  - Uses scalp brush    Medical course: Patient was physically examined by the ED prior to being transferred to the unit and was found to be medically stable and appropriate for admission. Medicine consult was done to provide clearance for ECT. Due to patient's persistent neck pain after surgery in 2021, CT Head was done which was negative. Oxycodone changed from q6hr prn to scheduled with an extra 5mg in the day as needed per PTA regimen. Patient continued to express some nerve pain so gabapentin was increased to 400mg.      Consults:  none    Medical course: Patient was physically examined by the ED prior to being transferred to the unit and was found to be medically stable and appropriate for admission.     Consults: Medicine for ECT Clearance, ECT Consult, Nutrition     Checklist     Legal Status: Voluntary     Safety Assessment:   Behavioral Orders   Procedures    Code 1 - Restrict to Unit    Code 2 - 1:1 Staff Supervision     For coming down to ECT only    Discontinue 1:1 attendant for suicide risk     Order Specific Question:   I have performed an in person assessment of the patient     Answer:   Based on this assessment the patient no longer requires a one on one attendant at this point in time.     Order Specific Question:   Rationale     Answer:   Patient States able to remain safe in hospital     Order Specific Question:    Rationale     Answer:   Modifications to care environment made to mitigate safety risk     Order Specific Question:   Rationale     Answer:   Routine observations are sufficient to monitor safety.    Electroconvulsive therapy     Series of up to 12 treatments. Begin Date: 5/24/24     Treating Psychiatrist providing ECT:  Ruthann     Notified on:  5/21/24    Electroconvulsive therapy     Series of up to 12 treatments. Begin Date: 5/24/24     Treating Psychiatrist providing ECT:  Ruthann     Notified on:  5/21/24    Fall precautions    Routine Programming     As clinically indicated    Status 15     Every 15 minutes.    Suicide precautions: Suicide Risk: MODERATE; Clinical rationale to override score: lack of access to a plan for self-harm     Order Specific Question:   Suicide Risk     Answer:   MODERATE     Order Specific Question:   Clinical rationale to override score:     Answer:   lack of access to a plan for self-harm       Risk Assessment:  Risk for harm is moderate-high.  Risk factors: SI, maladaptive coping, trauma, and past behaviors  Protective factors: engaged in treatment     SIO: Discontinued    Disposition: Pending stabilization, medication optimization, & development of a safe discharge plan.     Attestations        I saw and evaluated the patient. I personally spent a total of 35 minutes on care for this patient on the date of the encounter. This includes face-to-face as well as non-face-to-face time reviewing the chart, lab review, and coordination of care.     Faustino Beckett MD   Chief of Mental Health & Addiction Service Line

## 2024-05-31 NOTE — PROGRESS NOTES
Pt arrived to PACU and per handoff report from [facility], Dr. Carmen Perez, pt was given muscle relaxant before the anesthetic in the treatment room which caused the pt distress.  Pt was given versed prior to arrival to PACU.  Pt was in PACU for an extended time due to sedation from versed. Patient was adequate for discharge back to care unit at 09:50 AM.  VSS.  Report called to unit nurse Eli.  IV removed and hemostasis achieved less than 2 min.  Patient safely transported back to unit with  staff person.

## 2024-06-01 PROCEDURE — 250N000013 HC RX MED GY IP 250 OP 250 PS 637

## 2024-06-01 PROCEDURE — 124N000002 HC R&B MH UMMC

## 2024-06-01 PROCEDURE — 250N000013 HC RX MED GY IP 250 OP 250 PS 637: Performed by: PHYSICIAN ASSISTANT

## 2024-06-01 RX ADMIN — OXYCODONE HYDROCHLORIDE 5 MG: 5 TABLET ORAL at 17:30

## 2024-06-01 RX ADMIN — LEVOTHYROXINE SODIUM 150 MCG: 75 TABLET ORAL at 07:09

## 2024-06-01 RX ADMIN — OXYCODONE HYDROCHLORIDE 5 MG: 5 TABLET ORAL at 06:46

## 2024-06-01 RX ADMIN — ROPINIROLE HYDROCHLORIDE 2 MG: 2 TABLET, FILM COATED ORAL at 14:47

## 2024-06-01 RX ADMIN — Medication 500 MCG: at 08:25

## 2024-06-01 RX ADMIN — GUAIFENESIN 600 MG: 600 TABLET ORAL at 08:25

## 2024-06-01 RX ADMIN — FLUTICASONE FUROATE AND VILANTEROL TRIFENATATE 1 PUFF: 100; 25 POWDER RESPIRATORY (INHALATION) at 08:26

## 2024-06-01 RX ADMIN — ALLOPURINOL 300 MG: 300 TABLET ORAL at 20:10

## 2024-06-01 RX ADMIN — POTASSIUM CHLORIDE 20 MEQ: 1500 TABLET, EXTENDED RELEASE ORAL at 08:26

## 2024-06-01 RX ADMIN — OXYCODONE HYDROCHLORIDE 5 MG: 5 TABLET ORAL at 12:35

## 2024-06-01 RX ADMIN — Medication 1000 MG: at 08:26

## 2024-06-01 RX ADMIN — MAGNESIUM OXIDE TAB 400 MG (241.3 MG ELEMENTAL MG) 400 MG: 400 (241.3 MG) TAB at 08:25

## 2024-06-01 RX ADMIN — UMECLIDINIUM 1 PUFF: 62.5 AEROSOL, POWDER ORAL at 08:26

## 2024-06-01 RX ADMIN — Medication 12.5 MG: at 08:26

## 2024-06-01 RX ADMIN — ROPINIROLE HYDROCHLORIDE 2 MG: 2 TABLET, FILM COATED ORAL at 23:11

## 2024-06-01 RX ADMIN — PANTOPRAZOLE SODIUM 40 MG: 40 TABLET, DELAYED RELEASE ORAL at 08:25

## 2024-06-01 RX ADMIN — GABAPENTIN 400 MG: 400 CAPSULE ORAL at 23:11

## 2024-06-01 RX ADMIN — ROPINIROLE HYDROCHLORIDE 2 MG: 2 TABLET, FILM COATED ORAL at 20:10

## 2024-06-01 RX ADMIN — MAGNESIUM OXIDE TAB 400 MG (241.3 MG ELEMENTAL MG) 400 MG: 400 (241.3 MG) TAB at 20:10

## 2024-06-01 RX ADMIN — GUAIFENESIN 600 MG: 600 TABLET ORAL at 20:10

## 2024-06-01 RX ADMIN — OXYCODONE HYDROCHLORIDE 5 MG: 5 TABLET ORAL at 23:12

## 2024-06-01 ASSESSMENT — ACTIVITIES OF DAILY LIVING (ADL)
ORAL_HYGIENE: INDEPENDENT
DRESS: STREET CLOTHES
ADLS_ACUITY_SCORE: 41
HYGIENE/GROOMING: BATH
ADLS_ACUITY_SCORE: 41
HYGIENE/GROOMING: INDEPENDENT
ADLS_ACUITY_SCORE: 41
ORAL_HYGIENE: INDEPENDENT
ADLS_ACUITY_SCORE: 41
DRESS: STREET CLOTHES;INDEPENDENT
ADLS_ACUITY_SCORE: 41
LAUNDRY: WITH SUPERVISION
ADLS_ACUITY_SCORE: 41

## 2024-06-01 NOTE — PLAN OF CARE
"Pt presented with a bright affect. Reports that she slept good for 6 solid hours. Pt sits by the [facility] area and is observed interacting and engaging with peers. Pt oral food and fluid intake is adequate.  Pt was able to take shower this shift. She requested  for and was given  her home supplies to use in shower. Complained of back ache afterwards, rating it at 6/10. She was given scheduled Oxycodone. Pt said that she feels a little restless, endorsed mild anxiety. She denied depression, hallucinations and SI.  Pt is pleasant and cooperative.  She watched movies with peers in the afternoon.     Pt  visited her after lunch. Visit went well.     Problem: Adult Inpatient Plan of Care  Goal: Patient-Specific Goal (Individualized)  Description: You can add care plan individualizations to a care plan. Examples of Individualization might be:  \"Parent requests to be called daily at 9am for status\", \"I have a hard time hearing out of my right ear\", or \"Do not touch me to wake me up as it startles  me\".  Outcome: Progressing  Goal: Absence of Hospital-Acquired Illness or Injury  Outcome: Progressing     Problem: Adult Behavioral Health Plan of Care  Goal: Adheres to Safety Considerations for Self and Others  Outcome: Progressing  Intervention: Develop and Maintain Individualized Safety Plan  Recent Flowsheet Documentation  Taken 6/1/2024 1100 by Yoselin Garcia RN  Safety Measures: safety rounds completed  Goal: Absence of New-Onset Illness or Injury  Outcome: Progressing  Intervention: Identify and Manage Fall Risk  Recent Flowsheet Documentation  Taken 6/1/2024 1100 by Yoselin Garcia, RN  Safety Measures: safety rounds completed  Goal: Optimized Coping Skills in Response to Life Stressors  Outcome: Progressing  Intervention: Promote Effective Coping Strategies  Recent Flowsheet Documentation  Taken 6/1/2024 1052 by Yoselin Garcia, RN  Supportive Measures:   active listening utilized   " self-care encouraged     Problem: Suicide Risk  Goal: Absence of Self-Harm  Outcome: Progressing  Intervention: Assess Risk to Self and Maintain Safety  Recent Flowsheet Documentation  Taken 6/1/2024 1100 by Yoselin Garcia RN  Self-Harm Prevention: environmental self-harm risks assessed  Taken 6/1/2024 1052 by Yoselin Garcia RN  Behavior Management: behavioral plan reviewed  Self-Harm Prevention: environmental self-harm risks assessed  Intervention: Promote Psychosocial Wellbeing  Recent Flowsheet Documentation  Taken 6/1/2024 1100 by Yoselin Garcia RN  Family/Support System Care:   involvement promoted   self-care encouraged  Taken 6/1/2024 1052 by Yoselin Garcia RN  Supportive Measures:   active listening utilized   self-care encouraged  Family/Support System Care:   involvement promoted   presence promoted   self-care encouraged  Intervention: Establish Safety Plan and Continuity of Care  Recent Flowsheet Documentation  Taken 6/1/2024 1100 by Yoselin Garcia RN  Safe Transition Promotion: protective factors promoted  Taken 6/1/2024 1052 by Yoselin Garcia RN  Safe Transition Promotion: protective factors promoted     Problem: Depression  Goal: Improved Mood  Outcome: Progressing  Intervention: Monitor and Manage Depressive Symptoms  Recent Flowsheet Documentation  Taken 6/1/2024 1100 by Yoselin Garcia RN  Family/Support System Care:   involvement promoted   self-care encouraged  Taken 6/1/2024 1052 by Yoselin Garcia RN  Supportive Measures:   active listening utilized   self-care encouraged  Family/Support System Care:   involvement promoted   presence promoted   self-care encouraged     Problem: Suicidal Behavior  Goal: Suicidal Behavior is Absent or Managed  Outcome: Progressing  Intervention: Provide Immediate and Ongoing Protective Physical Environment  Recent Flowsheet Documentation  Taken 6/1/2024 1100 by Yoselin Garcia  Adwoa GOODWIN RN  Safe Transition Promotion: protective factors promoted  Taken 6/1/2024 1052 by Yoselin Garcia RN  Safe Transition Promotion: protective factors promoted     Problem: Sleep Disturbance  Goal: Adequate Sleep/Rest  Outcome: Progressing     Problem: Fall Injury Risk  Goal: Absence of Fall and Fall-Related Injury  Outcome: Progressing     Problem: Anxiety  Goal: Anxiety Reduction or Resolution  Outcome: Progressing  Intervention: Promote Anxiety Reduction  Recent Flowsheet Documentation  Taken 6/1/2024 1100 by Yoselin Garcia RN  Family/Support System Care:   involvement promoted   self-care encouraged  Taken 6/1/2024 1052 by Yoselin Garcia, RN  Supportive Measures:   active listening utilized   self-care encouraged  Family/Support System Care:   involvement promoted   presence promoted   self-care encouraged   Goal Outcome Evaluation:    Plan of Care Reviewed With: patient

## 2024-06-01 NOTE — PLAN OF CARE
The pt visible out on milieu most of the shift social with selected and watching TV. The pt presented calm in mood, bright in affect, pleasant, acknowledged improvement in mood and glad slept 6 hours last night, took short nap, endorsed anxiety 4/10, denied of depression, SI/HI, hallucination and contracted for safety. Chronic back pain managed with scheduled med. No behavioral or safety concern noted/reported. Adequate food and fluid intake. Comply with med and care.The pt assisted with TENS pad placement in room. The pt given scheduled Oxycodone.     Problem: Adult Behavioral Health Plan of Care  Goal: Develops/Participates in Therapeutic [place] to Support Successful Transition  Outcome: Progressing  Intervention: Foster Therapeutic [place]  Recent Flowsheet Documentation  Taken 6/1/2024 1730 by Miguel Suarez RN  Trust Relationship/Rapport:   care explained                                              choices provided   emotional support provided                        empathic listening provided   questions answered                                     reassurance provided   thoughts/feelings acknowledged     Problem: Depression  Goal: Improved Mood  Outcome: Progressing  Intervention: Monitor and Manage Depressive Symptoms  Recent Flowsheet Documentation  Taken 6/1/2024 1730 by Miguel Suarez RN  Supportive Measures:   active listening utilized                                 self-care encouraged   self-reflection promoted                               self-responsibility promoted   verbalization of feelings encouraged  Family/Support System Care:   involvement promoted   self-care encouraged     Problem: Anxiety  Goal: Anxiety Reduction or Resolution  Outcome: Progressing  Intervention: Promote Anxiety Reduction  Recent Flowsheet Documentation  Taken 6/1/2024 1730 by Miguel Suarez RN  Supportive Measures:   active listening utilized                                  self-care encouraged    self-reflection promoted                                self-responsibility promoted   verbalization of feelings encouraged  Family/Support System Care:   involvement promoted   self-care encouraged   Goal Outcome Evaluation:    Plan of Care Reviewed With: patient

## 2024-06-02 PROCEDURE — 124N000002 HC R&B MH UMMC

## 2024-06-02 PROCEDURE — 250N000013 HC RX MED GY IP 250 OP 250 PS 637

## 2024-06-02 PROCEDURE — 250N000013 HC RX MED GY IP 250 OP 250 PS 637: Performed by: PHYSICIAN ASSISTANT

## 2024-06-02 RX ADMIN — ROPINIROLE HYDROCHLORIDE 2 MG: 2 TABLET, FILM COATED ORAL at 15:03

## 2024-06-02 RX ADMIN — BENZONATATE 200 MG: 100 CAPSULE ORAL at 16:38

## 2024-06-02 RX ADMIN — ALLOPURINOL 300 MG: 300 TABLET ORAL at 20:19

## 2024-06-02 RX ADMIN — ACETAMINOPHEN 650 MG: 325 TABLET, FILM COATED ORAL at 16:36

## 2024-06-02 RX ADMIN — GUAIFENESIN 600 MG: 600 TABLET ORAL at 08:40

## 2024-06-02 RX ADMIN — ROPINIROLE HYDROCHLORIDE 2 MG: 2 TABLET, FILM COATED ORAL at 20:19

## 2024-06-02 RX ADMIN — OXYCODONE HYDROCHLORIDE 5 MG: 5 TABLET ORAL at 13:20

## 2024-06-02 RX ADMIN — Medication 3 MG: at 23:03

## 2024-06-02 RX ADMIN — UMECLIDINIUM 1 PUFF: 62.5 AEROSOL, POWDER ORAL at 06:29

## 2024-06-02 RX ADMIN — Medication 1000 MG: at 08:51

## 2024-06-02 RX ADMIN — GABAPENTIN 100 MG: 100 CAPSULE ORAL at 13:23

## 2024-06-02 RX ADMIN — ROPINIROLE HYDROCHLORIDE 2 MG: 2 TABLET, FILM COATED ORAL at 23:03

## 2024-06-02 RX ADMIN — POTASSIUM CHLORIDE 20 MEQ: 1500 TABLET, EXTENDED RELEASE ORAL at 08:40

## 2024-06-02 RX ADMIN — PANTOPRAZOLE SODIUM 40 MG: 40 TABLET, DELAYED RELEASE ORAL at 08:40

## 2024-06-02 RX ADMIN — GUAIFENESIN 600 MG: 600 TABLET ORAL at 20:19

## 2024-06-02 RX ADMIN — Medication 500 MCG: at 08:40

## 2024-06-02 RX ADMIN — Medication 12.5 MG: at 08:40

## 2024-06-02 RX ADMIN — OXYCODONE HYDROCHLORIDE 5 MG: 5 TABLET ORAL at 18:15

## 2024-06-02 RX ADMIN — LEVOTHYROXINE SODIUM 75 MCG: 75 TABLET ORAL at 06:31

## 2024-06-02 RX ADMIN — MAGNESIUM OXIDE TAB 400 MG (241.3 MG ELEMENTAL MG) 400 MG: 400 (241.3 MG) TAB at 08:40

## 2024-06-02 RX ADMIN — OXYCODONE HYDROCHLORIDE 5 MG: 5 TABLET ORAL at 05:55

## 2024-06-02 RX ADMIN — MAGNESIUM OXIDE TAB 400 MG (241.3 MG ELEMENTAL MG) 400 MG: 400 (241.3 MG) TAB at 20:19

## 2024-06-02 RX ADMIN — FLUTICASONE FUROATE AND VILANTEROL TRIFENATATE 1 PUFF: 100; 25 POWDER RESPIRATORY (INHALATION) at 06:29

## 2024-06-02 RX ADMIN — OXYCODONE HYDROCHLORIDE 5 MG: 5 TABLET ORAL at 23:03

## 2024-06-02 RX ADMIN — OXYCODONE HYDROCHLORIDE 5 MG: 5 TABLET ORAL at 12:25

## 2024-06-02 ASSESSMENT — ACTIVITIES OF DAILY LIVING (ADL)
ADLS_ACUITY_SCORE: 41
ORAL_HYGIENE: INDEPENDENT
ADLS_ACUITY_SCORE: 41
DRESS: STREET CLOTHES
ADLS_ACUITY_SCORE: 41
DRESS: STREET CLOTHES;INDEPENDENT
ADLS_ACUITY_SCORE: 41
HYGIENE/GROOMING: INDEPENDENT
ADLS_ACUITY_SCORE: 41
ORAL_HYGIENE: INDEPENDENT
ADLS_ACUITY_SCORE: 41
HYGIENE/GROOMING: INDEPENDENT
ADLS_ACUITY_SCORE: 41

## 2024-06-02 NOTE — PLAN OF CARE
"Pt was visible in the milieu in the morning She is interacting with peers appropriately. She is eating meals by the [facility] area while watching movies. She is medication compliant. Upon checking, pt endorsed Anxiety at 7/10, depression at 8/10. She denies SI and hallucinations.  Pt was later tearful . She said that she has observed that she feels okay the day after the ECT, then the following day after ECT, she is back to her tearful self. Pt was encouraged to verbalize feelings. She  then  talked about personal and family trauma from the past,  and the dynamics that happened which still bother her. Empathic listening was provided. Pt was provided counseling and was encouraged to do some self reflection and self care. Pt was receptive. Pt stayed mostly in her room afterward. Pt able to join community meeting in the morning. In the afternoon, pt came to ask for her PRN pain medication, rating it at 8/10 . She was also given PRN for her anxiety. .     PRNs: Oxycodone             Gabapentin    Problem: Adult Behavioral Health Plan of Care  Goal: Patient-Specific Goal (Individualization)  Description: You can add care plan individualizations to a care plan. Examples of Individualization might be:  \"Parent requests to be called daily at 9am for status\", \"I have a hard time hearing out of my right ear\", or \"Do not touch me to wake me up as it startles  me\".  6/2/2024 1438 by Yoselin Garcia, RN  Outcome: Progressing  6/2/2024 1434 by Yoselin Garcia, RN  Outcome: Progressing  Goal: Adheres to Safety Considerations for Self and Others  6/2/2024 1438 by Yoselin Garcia, RN  Outcome: Progressing  6/2/2024 1434 by Yoselin Garcia, RN  Outcome: Progressing  Intervention: Develop and Maintain Individualized Safety Plan  Recent Flowsheet Documentation  Taken 6/2/2024 1400 by Yoselin Garcia, RN  Safety Measures: safety rounds completed  Goal: Absence of New-Onset Illness or " Injury  6/2/2024 1438 by Yoselin Garcia RN  Outcome: Progressing  6/2/2024 1434 by Yoselin Garcia RN  Outcome: Progressing  Intervention: Identify and Manage Fall Risk  Recent Flowsheet Documentation  Taken 6/2/2024 1400 by Yoselin Garcia RN  Safety Measures: safety rounds completed  Goal: Optimized Coping Skills in Response to Life Stressors  6/2/2024 1438 by Yoselin Garcia RN  Outcome: Progressing  6/2/2024 1434 by Yoselin Garcia RN  Outcome: Progressing  Intervention: Promote Effective Coping Strategies  Recent Flowsheet Documentation  Taken 6/2/2024 1400 by Yoselin Garcia RN  Supportive Measures:   active listening utilized   self-care encouraged   self-reflection promoted   self-responsibility promoted   verbalization of feelings encouraged  Taken 6/2/2024 0822 by Yoselin Garcia RN  Supportive Measures:   active listening utilized   counseling provided     Problem: Suicide Risk  Goal: Absence of Self-Harm  Outcome: Progressing  Intervention: Assess Risk to Self and Maintain Safety  Recent Flowsheet Documentation  Taken 6/2/2024 1400 by Yoselin Garcia RN  Behavior Management: behavioral plan reviewed  Self-Harm Prevention: environmental self-harm risks assessed  Taken 6/2/2024 0822 by Yoselin Garcia RN  Behavior Management: impulse control promoted  Self-Harm Prevention: environmental self-harm risks assessed  Intervention: Promote Psychosocial Wellbeing  Recent Flowsheet Documentation  Taken 6/2/2024 1400 by Yoselin Garcia RN  Supportive Measures:   active listening utilized   self-care encouraged   self-reflection promoted   self-responsibility promoted   verbalization of feelings encouraged  Family/Support System Care:   involvement promoted   self-care encouraged  Taken 6/2/2024 0822 by Yoselin Garcia RN  Supportive Measures:   active listening utilized   counseling  provided  Family/Support System Care:   presence promoted   self-care encouraged  Intervention: Establish Safety Plan and Continuity of Care  Recent Flowsheet Documentation  Taken 6/2/2024 1400 by Yoselin Garcia RN  Safe Transition Promotion: protective factors promoted  Taken 6/2/2024 0822 by Yoselin Garcia RN  Safe Transition Promotion: protective factors promoted     Problem: Depression  Goal: Improved Mood  Outcome: Progressing  Intervention: Monitor and Manage Depressive Symptoms  Recent Flowsheet Documentation  Taken 6/2/2024 1400 by Yoselin Garcia RN  Supportive Measures:   active listening utilized   self-care encouraged   self-reflection promoted   self-responsibility promoted   verbalization of feelings encouraged  Family/Support System Care:   involvement promoted   self-care encouraged  Taken 6/2/2024 0822 by Yoselin Garcia RN  Supportive Measures:   active listening utilized   counseling provided  Family/Support System Care:   presence promoted   self-care encouraged     Problem: Suicidal Behavior  Goal: Suicidal Behavior is Absent or Managed  6/2/2024 1438 by Yoselin Garcia RN  Outcome: Progressing  6/2/2024 1434 by Yoselin Garcia RN  Outcome: Progressing  Intervention: Provide Immediate and Ongoing Protective Physical Environment  Recent Flowsheet Documentation  Taken 6/2/2024 1400 by Yoselin Garcia RN  Safe Transition Promotion: protective factors promoted  Taken 6/2/2024 0822 by Yoselin Garcia RN  Safe Transition Promotion: protective factors promoted     Problem: Sleep Disturbance  Goal: Adequate Sleep/Rest  Outcome: Progressing     Problem: Fall Injury Risk  Goal: Absence of Fall and Fall-Related Injury  Outcome: Progressing     Problem: Psychotic Signs/Symptoms  Goal: Improved Behavioral Control (Psychotic Signs/Symptoms)  Outcome: Progressing   Goal Outcome Evaluation:    Plan of Care Reviewed With: patient

## 2024-06-02 NOTE — PLAN OF CARE
Towards the end of the shift , pt requested and given inhaler as ordered- was helpful.  Denied any mental health sx. Pain controlled with the scheduled pain meds.  Safety checks completed every 15 minutes; no safety concerns noted. Pt appears to have slept for  5 hours; will continue to monitor and offer support.     Problem: Sleep Disturbance  Goal: Adequate Sleep/Rest  Outcome: Progressing   Goal Outcome Evaluation:

## 2024-06-02 NOTE — PLAN OF CARE
"The pt visible out on [facility] most of the shift watching TV and social with peer. The pt presented more anxious/sad in mood today, flat in affect, endorsed anxiety 7/10, depression 8/10, tearful, preoccupied with concern about anticipated discharge and afterward care, stating worried about not able to take care of herself, the writer acknowledged concern, active listening utilized, receptive upon redirection and going to talk to team. Complain headache pain 6/10 per requested PRN Tylenol given and relieved stated. Complain intermittent cough, per requested PRN Tessalon given and relieved stated. Denied of SI/HI, AH/VH and contracted for safety. No behavioral or safety concern noted. Ate 50% supper and adequate fluid intake. The pt latter appeared playing card alone and listening music over headphone.  visited and went well. Comply with med and care. PRN Melatonin given for sleep aid. The pt will have ECT tomorrow and scheduled Gabapentin held as per ordered.    /84 (BP Location: Right arm, Patient Position: Sitting, Cuff Size: Adult Regular)   Pulse 81   Temp 97.5  F (36.4  C) (Oral)   Resp 16   Ht 1.676 m (5' 6\")   Wt 88 kg (194 lb)   LMP  (LMP Unknown)   SpO2 94%   BMI 31.31 kg/m      Problem: Adult Behavioral Health Plan of Care  Goal: Develops/Participates in Therapeutic [place] to Support Successful Transition  Outcome: Progressing  Intervention: Foster Therapeutic [place]  Recent Flowsheet Documentation  Taken 6/2/2024 1630 by Miguel Suarez, RN  Trust Relationship/Rapport:   care explained                                                  choices provided   emotional support provided                            empathic listening provided   questions answered                                          reassurance provided   thoughts/feelings acknowledged     Problem: Depression  Goal: Improved Mood  Outcome: Progressing  Intervention: Monitor and Manage Depressive Symptoms  Recent " Flowsheet Documentation  Taken 6/2/2024 1630 by Miguel Suarez, RN  Supportive Measures:   active listening utilized   self-care encouraged   self-reflection promoted   self-responsibility promoted   verbalization of feelings encouraged  Family/Support System Care:   involvement promoted                                presence promoted   self-care encouraged     Problem: Anxiety  Goal: Anxiety Reduction or Resolution  Outcome: Progressing  Intervention: Promote Anxiety Reduction  Recent Flowsheet Documentation  Taken 6/2/2024 1630 by Miguel Suarez, RN  Supportive Measures:   active listening utilized                                  self-care encouraged   self-reflection promoted                                self-responsibility promoted   verbalization of feelings encouraged  Family/Support System Care:   involvement promoted                                   presence promoted   self-care encouraged   Goal Outcome Evaluation:    Plan of Care Reviewed With: patient

## 2024-06-03 ENCOUNTER — APPOINTMENT (OUTPATIENT)
Dept: BEHAVIORAL HEALTH | Facility: CLINIC | Age: 69
DRG: 881 | End: 2024-06-03
Payer: MEDICARE

## 2024-06-03 ENCOUNTER — ANESTHESIA EVENT (OUTPATIENT)
Dept: BEHAVIORAL HEALTH | Facility: CLINIC | Age: 69
DRG: 881 | End: 2024-06-03
Payer: MEDICARE

## 2024-06-03 ENCOUNTER — ANESTHESIA (OUTPATIENT)
Dept: BEHAVIORAL HEALTH | Facility: CLINIC | Age: 69
DRG: 881 | End: 2024-06-03
Payer: MEDICARE

## 2024-06-03 PROCEDURE — G0177 OPPS/PHP; TRAIN & EDUC SERV: HCPCS

## 2024-06-03 PROCEDURE — 250N000013 HC RX MED GY IP 250 OP 250 PS 637

## 2024-06-03 PROCEDURE — 250N000009 HC RX 250: Performed by: STUDENT IN AN ORGANIZED HEALTH CARE EDUCATION/TRAINING PROGRAM

## 2024-06-03 PROCEDURE — 90870 ELECTROCONVULSIVE THERAPY: CPT | Performed by: STUDENT IN AN ORGANIZED HEALTH CARE EDUCATION/TRAINING PROGRAM

## 2024-06-03 PROCEDURE — 370N000017 HC ANESTHESIA TECHNICAL FEE, PER MIN: Performed by: STUDENT IN AN ORGANIZED HEALTH CARE EDUCATION/TRAINING PROGRAM

## 2024-06-03 PROCEDURE — 250N000013 HC RX MED GY IP 250 OP 250 PS 637: Performed by: PHYSICIAN ASSISTANT

## 2024-06-03 PROCEDURE — 90870 ELECTROCONVULSIVE THERAPY: CPT

## 2024-06-03 PROCEDURE — 250N000011 HC RX IP 250 OP 636: Performed by: STUDENT IN AN ORGANIZED HEALTH CARE EDUCATION/TRAINING PROGRAM

## 2024-06-03 PROCEDURE — 124N000002 HC R&B MH UMMC

## 2024-06-03 PROCEDURE — 90870 ELECTROCONVULSIVE THERAPY: CPT | Performed by: PSYCHIATRY & NEUROLOGY

## 2024-06-03 PROCEDURE — 90870 ELECTROCONVULSIVE THERAPY: CPT | Performed by: NURSE ANESTHETIST, CERTIFIED REGISTERED

## 2024-06-03 PROCEDURE — 250N000011 HC RX IP 250 OP 636: Performed by: PSYCHIATRY & NEUROLOGY

## 2024-06-03 PROCEDURE — 99232 SBSQ HOSP IP/OBS MODERATE 35: CPT | Mod: GC | Performed by: STUDENT IN AN ORGANIZED HEALTH CARE EDUCATION/TRAINING PROGRAM

## 2024-06-03 RX ORDER — OXYCODONE HYDROCHLORIDE 5 MG/1
5 TABLET ORAL
Status: CANCELLED | OUTPATIENT
Start: 2024-06-03

## 2024-06-03 RX ORDER — ONDANSETRON 4 MG/1
4 TABLET, ORALLY DISINTEGRATING ORAL EVERY 30 MIN PRN
Status: CANCELLED | OUTPATIENT
Start: 2024-06-03

## 2024-06-03 RX ORDER — METHOHEXITAL IN WATER/PF 100MG/10ML
SYRINGE (ML) INTRAVENOUS PRN
Status: DISCONTINUED | OUTPATIENT
Start: 2024-06-03 | End: 2024-06-03

## 2024-06-03 RX ORDER — DEXAMETHASONE SODIUM PHOSPHATE 4 MG/ML
4 INJECTION, SOLUTION INTRA-ARTICULAR; INTRALESIONAL; INTRAMUSCULAR; INTRAVENOUS; SOFT TISSUE
Status: CANCELLED | OUTPATIENT
Start: 2024-06-03

## 2024-06-03 RX ORDER — NALOXONE HYDROCHLORIDE 0.4 MG/ML
0.1 INJECTION, SOLUTION INTRAMUSCULAR; INTRAVENOUS; SUBCUTANEOUS
Status: CANCELLED | OUTPATIENT
Start: 2024-06-03

## 2024-06-03 RX ORDER — HYDROMORPHONE HCL IN WATER/PF 6 MG/30 ML
0.4 PATIENT CONTROLLED ANALGESIA SYRINGE INTRAVENOUS EVERY 5 MIN PRN
Status: CANCELLED | OUTPATIENT
Start: 2024-06-03

## 2024-06-03 RX ORDER — SODIUM CHLORIDE, SODIUM LACTATE, POTASSIUM CHLORIDE, CALCIUM CHLORIDE 600; 310; 30; 20 MG/100ML; MG/100ML; MG/100ML; MG/100ML
INJECTION, SOLUTION INTRAVENOUS CONTINUOUS
Status: CANCELLED | OUTPATIENT
Start: 2024-06-03

## 2024-06-03 RX ORDER — ONDANSETRON 2 MG/ML
4 INJECTION INTRAMUSCULAR; INTRAVENOUS EVERY 30 MIN PRN
Status: CANCELLED | OUTPATIENT
Start: 2024-06-03

## 2024-06-03 RX ORDER — NICARDIPINE HCL-0.9% SOD CHLOR 1 MG/10 ML
SYRINGE (ML) INTRAVENOUS PRN
Status: DISCONTINUED | OUTPATIENT
Start: 2024-06-03 | End: 2024-06-03

## 2024-06-03 RX ORDER — OXYCODONE HYDROCHLORIDE 5 MG/1
10 TABLET ORAL
Status: CANCELLED | OUTPATIENT
Start: 2024-06-03

## 2024-06-03 RX ORDER — KETOROLAC TROMETHAMINE 30 MG/ML
30 INJECTION, SOLUTION INTRAMUSCULAR; INTRAVENOUS ONCE
Status: DISCONTINUED | OUTPATIENT
Start: 2024-06-03 | End: 2024-06-03

## 2024-06-03 RX ORDER — FENTANYL CITRATE 50 UG/ML
25 INJECTION, SOLUTION INTRAMUSCULAR; INTRAVENOUS EVERY 5 MIN PRN
Status: CANCELLED | OUTPATIENT
Start: 2024-06-03

## 2024-06-03 RX ORDER — HYDROMORPHONE HCL IN WATER/PF 6 MG/30 ML
0.2 PATIENT CONTROLLED ANALGESIA SYRINGE INTRAVENOUS EVERY 5 MIN PRN
Status: CANCELLED | OUTPATIENT
Start: 2024-06-03

## 2024-06-03 RX ORDER — FENTANYL CITRATE 50 UG/ML
50 INJECTION, SOLUTION INTRAMUSCULAR; INTRAVENOUS EVERY 5 MIN PRN
Status: CANCELLED | OUTPATIENT
Start: 2024-06-03

## 2024-06-03 RX ADMIN — MAGNESIUM OXIDE TAB 400 MG (241.3 MG ELEMENTAL MG) 400 MG: 400 (241.3 MG) TAB at 20:40

## 2024-06-03 RX ADMIN — Medication 3 MG: at 23:01

## 2024-06-03 RX ADMIN — LEVOTHYROXINE SODIUM 150 MCG: 75 TABLET ORAL at 06:15

## 2024-06-03 RX ADMIN — ROPINIROLE HYDROCHLORIDE 2 MG: 2 TABLET, FILM COATED ORAL at 23:00

## 2024-06-03 RX ADMIN — BENZONATATE 200 MG: 100 CAPSULE ORAL at 21:53

## 2024-06-03 RX ADMIN — OXYCODONE HYDROCHLORIDE 5 MG: 5 TABLET ORAL at 18:27

## 2024-06-03 RX ADMIN — Medication 1000 MG: at 12:13

## 2024-06-03 RX ADMIN — GABAPENTIN 100 MG: 100 CAPSULE ORAL at 14:57

## 2024-06-03 RX ADMIN — Medication 500 MCG: at 11:18

## 2024-06-03 RX ADMIN — POTASSIUM CHLORIDE 20 MEQ: 1500 TABLET, EXTENDED RELEASE ORAL at 12:14

## 2024-06-03 RX ADMIN — OXYCODONE HYDROCHLORIDE 5 MG: 5 TABLET ORAL at 12:14

## 2024-06-03 RX ADMIN — GABAPENTIN 400 MG: 400 CAPSULE ORAL at 23:01

## 2024-06-03 RX ADMIN — ALLOPURINOL 300 MG: 300 TABLET ORAL at 20:40

## 2024-06-03 RX ADMIN — Medication 500 MCG: at 11:20

## 2024-06-03 RX ADMIN — OXYCODONE HYDROCHLORIDE 5 MG: 5 TABLET ORAL at 06:13

## 2024-06-03 RX ADMIN — ROPINIROLE HYDROCHLORIDE 2 MG: 2 TABLET, FILM COATED ORAL at 20:39

## 2024-06-03 RX ADMIN — Medication 500 MCG: at 12:14

## 2024-06-03 RX ADMIN — GUAIFENESIN 600 MG: 600 TABLET ORAL at 20:39

## 2024-06-03 RX ADMIN — Medication 100 MG: at 11:18

## 2024-06-03 RX ADMIN — ROPINIROLE HYDROCHLORIDE 2 MG: 2 TABLET, FILM COATED ORAL at 15:18

## 2024-06-03 RX ADMIN — KETOROLAC TROMETHAMINE 30 MG: 30 INJECTION, SOLUTION INTRAMUSCULAR; INTRAVENOUS at 11:14

## 2024-06-03 RX ADMIN — Medication 12.5 MG: at 12:14

## 2024-06-03 RX ADMIN — OXYCODONE HYDROCHLORIDE 5 MG: 5 TABLET ORAL at 21:53

## 2024-06-03 RX ADMIN — MAGNESIUM OXIDE TAB 400 MG (241.3 MG ELEMENTAL MG) 400 MG: 400 (241.3 MG) TAB at 12:21

## 2024-06-03 RX ADMIN — FLUTICASONE FUROATE AND VILANTEROL TRIFENATATE 1 PUFF: 100; 25 POWDER RESPIRATORY (INHALATION) at 08:51

## 2024-06-03 RX ADMIN — SUCCINYLCHOLINE CHLORIDE 90 MG: 20 INJECTION, SOLUTION INTRAMUSCULAR; INTRAVENOUS; PARENTERAL at 11:18

## 2024-06-03 RX ADMIN — UMECLIDINIUM 1 PUFF: 62.5 AEROSOL, POWDER ORAL at 08:51

## 2024-06-03 RX ADMIN — PANTOPRAZOLE SODIUM 40 MG: 40 TABLET, DELAYED RELEASE ORAL at 08:50

## 2024-06-03 RX ADMIN — GUAIFENESIN 600 MG: 600 TABLET ORAL at 12:22

## 2024-06-03 RX ADMIN — ACETAMINOPHEN 650 MG: 325 TABLET, FILM COATED ORAL at 08:50

## 2024-06-03 ASSESSMENT — ACTIVITIES OF DAILY LIVING (ADL)
ADLS_ACUITY_SCORE: 41
HYGIENE/GROOMING: INDEPENDENT
ADLS_ACUITY_SCORE: 41
DRESS: INDEPENDENT
ADLS_ACUITY_SCORE: 41
HYGIENE/GROOMING: INDEPENDENT
ADLS_ACUITY_SCORE: 41
ORAL_HYGIENE: INDEPENDENT
ADLS_ACUITY_SCORE: 41
DRESS: INDEPENDENT
ADLS_ACUITY_SCORE: 41
ORAL_HYGIENE: INDEPENDENT
ADLS_ACUITY_SCORE: 41

## 2024-06-03 ASSESSMENT — COPD QUESTIONNAIRES: COPD: 1

## 2024-06-03 NOTE — PROCEDURES
"Procedures  [facility] [place]   ECT Procedure Note   06/03/2024    Randi Cleary is a 68 year old female patient.  [id number]    Patient Status: IN-patient    Is this the first in a series of 12 treatments?  No      Allergies   Allergen Reactions    Serotonin Reuptake Inhibitors (Ssris) Anxiety, Difficulty breathing, Headache, Palpitations and Shortness Of Breath    Buspirone      The patient states she had serotonin syndrome    Cephalexin      Other reaction(s): unknown rxn.    Desvenlafaxine      Serotonin syndrome    Diclofenac Sodium [Diclofenac]      Serotonin syndrome and restless legs syndrome    Gabapentin      Drove on the wrong side of the highway    Levofloxacin      \"CAN'T REMEMBER\"    Penicillins      \"SORES IN MOUTH\"    Riluzole Difficulty breathing and Swelling    Sulfa Antibiotics      \"PT DOES NOT KNOW WHAT THE REACTION WAS\"    Topiramate Other (See Comments)     Frequent urination       Weight:  194 lbs 0 oz / 87 kg          Indications for ECT:   Medications ineffective and Psychotherapies ineffective         Clinical Narrative:   HPI - The patient describes a lifelong history of depression dating back to elementary school, worsening after her father's death when she was 13yo and especially in the 1980s in the setting of worsening physical health (since falling and breaking her ankle), which led to the the initiation of fluoxetine, her first antidepressant.  Her history has been primarily characterized by low mood, with some ups and downs but no extended depression-free periods since then.  She has tried many different medications from different classes, but cannot recall any one being particularly helpful for her.  She has experienced past episodes of particularly irritable mood and concomitant decreased sleep need, although most of these have lasted 1-2 days and triggered by anger related to external stressors.  She has at least one episode of a more extended " "episode of elevated mood (and associated grandiosity, increased spending, flight of ideas, increased goal directed behaviors, pressured speech, and odd and embarrassing behavior), which occurred in the context of relapse on alcohol in 2021. She does not endorse any events before about age 48.     The patient's depression is characterized by near daily low mood, frequent anhedonia, with sleep difficulties (although c/b pain, KYAW, and RLS), fatigue, feelings of guilt/worthlessness, and impaired concentration.  She reports feelings of \"despair,\" at times with active SI with plan, but denies any intent to act on this - \"maybe it's hope.\"  She has 1 lifetime suicide attempt (overdose) in the 1980s, for which she was psychiatrically hospitalized.  She has a remote history of SIB (cutting) but not recently.       Psych pertinent item history includes includes suicide attempt , suicidal ideation, SIB , aggression, trauma hx, substance use: alcohol, cannabis, and Patient has a history of alcohol dependence treated 20+ years ago and she relapsed in 2020 for a couple of months, in her 20s she\" loved getting high\" on cocaine, LSD, mushrooms, speed, white cross, mutiple psychotropic trials , psych hosp, ketamine, and major medical problems.    Target Symptoms for Improvement: Amotivation, Fatigue, Improved self-image and Panic attacks         Diagnosis:   Major depression         Assessment:   #1 05/24/24 Some circumstantial thought, needs some redirection, 3.5 hours sleep last night, anxious, mood 1/10, PDW but no SI, never had ECT before - only TMS. No AVH.   #2 05/29/24  Mood 4/10, better over w/e, some word find difficulties, no AVH/SI/PDW. Chronic sciatica pain, neck and shoulder pain - didn't worsen with ECT. Mild headache.   #3 05/31/24  Mood 4/10, No SI, PDW, AVH, some wrist pain and headache, memory might be improving.    #4 6/3/24  Phq-9= 13, mood 3/10.  Yesterday was very tearful, still a bit today.  Last treatment " had awareness under paralysis.  Otherwise, some initial confusion after first ECT but no subsequent cognitive effects.  Signed consent to continue.         Pause for the Cause:     Correct patient Yes   Correct procedure/laterality settings: Yes           Intra-Procedure Documentation:     ECT #: 4   Treatment number this series: 4   Total treatment number: 4     Type of ECT:  Right, unilateral ultrabrief    ECT Medications:    Toradol 30mg iv - for headache/myalgia     Brevital: 100 mg (incr from 90 mg as still awake)   Succinyl Choline: 90 mg    BP - nicardipine 1500 mcg         ECT Strip Summary: RUL Titration #3: 38.4 mC   Energy Level:  230.4 mC, 0.3 ms, 60 Hz, 8 sec, 800 mA     Motor Seizure Duration: 23 seconds  EEG Seizure Duration: 34 seconds    Complications: none  Plan:   - Continue RUL ECT - Q MWF    - Monitor depression severity with clinical assessment augmented with PHQ9 every other treatment  - Continue current medications    Discharge instructions:   - Continue acute ECT    - Per Dr Varela:   - Consider trial of serotonin modulator (e.g., vilazodone vs. vortioxetine) or novel agent Auvelity  - Consider augmentation with atypical antipsychotic (e.g., quetiapine or aripiprazole), though there are concerns given pre-diabetes      Toan Cotter MD  Dept of Psychiatry

## 2024-06-03 NOTE — PROGRESS NOTES
"  ----------------------------------------------------------------------------------------------------------  [facility]  Psychiatry Progress Note  Hospital Day #13     Interim History:     The patient's care was discussed with the treatment team and chart notes were reviewed.  Sleep: 5.5 hours (06/03/24 0615)  Staff Report:  No acute events. Staff reports she was worried about aftercare plan.        Subjective:     Reports she had a bad day yesterday due to arguments with peers and staff. She shared frustration about not having options for dinner and requested for options. Reports she is vegetarian because she has gout but she can eat chicken, turkey. Agreed with dietician consult. Shared about not ready to be discharged. States home is isolating since her  spends majority of his time on electronics. Though reports he try to help what he can.     ROS:  Patient has pain in her neck.  Patient denies acute concerns     Objective:     Vitals:  /77 (BP Location: Right arm, Patient Position: Sitting)   Pulse 88   Temp 98.2  F (36.8  C) (Oral)   Resp 18   Ht 1.676 m (5' 6\")   Wt 88 kg (194 lb)   LMP  (LMP Unknown)   SpO2 96%   BMI 31.31 kg/m      Allergies:  Allergies   Allergen Reactions    Serotonin Reuptake Inhibitors (Ssris) Anxiety, Difficulty breathing, Headache, Palpitations and Shortness Of Breath    Buspirone      The patient states she had serotonin syndrome    Cephalexin      Other reaction(s): unknown rxn.    Desvenlafaxine      Serotonin syndrome    Diclofenac Sodium [Diclofenac]      Serotonin syndrome and restless legs syndrome    Gabapentin      Drove on the wrong side of the highway    Levofloxacin      \"CAN'T REMEMBER\"    Penicillins      \"SORES IN MOUTH\"    Riluzole Difficulty breathing and Swelling    Sulfa Antibiotics      \"PT DOES NOT KNOW WHAT THE REACTION WAS\"    Topiramate Other (See Comments)     Frequent urination       Current " Medications:  Scheduled:  Current Facility-Administered Medications   Medication Dose Route Frequency Provider Last Rate Last Admin    acetaminophen (TYLENOL) tablet 650 mg  650 mg Oral Q4H PRN Wilner Parker MD   650 mg at 06/03/24 0850    albuterol (PROVENTIL HFA/VENTOLIN HFA) inhaler  2 puff Inhalation Q6H PRN Wilner Parker MD   2 puff at 05/31/24 1741    allopurinol (ZYLOPRIM) tablet 300 mg  300 mg Oral QPM Nallely Barber MD   300 mg at 06/02/24 2019    alum & mag hydroxide-simethicone (MAALOX) suspension 30 mL  30 mL Oral Q4H PRN Wilner Parker MD        ascorbic acid tablet 1,000 mg  1,000 mg Oral Daily Wilner Parker MD   1,000 mg at 06/03/24 1213    benzocaine-menthol (CHLORASEPTIC) 6-10 MG lozenge 1 lozenge  1 lozenge Buccal Q1H PRN Erin Ferrera MD        benzonatate (TESSALON) capsule 200 mg  200 mg Oral TID PRN Wilner Parker MD   200 mg at 06/02/24 1638    cholecalciferol (VITAMIN D3) capsule 250 mcg  250 mcg Oral Weekly Wilner Parker MD   250 mcg at 05/29/24 1300    cyanocobalamin (VITAMIN B-12) sublingual tablet 500 mcg  500 mcg Sublingual Daily Wilner Parker MD   500 mcg at 06/03/24 1214    ethacrynic acid (EDECRIN) half-tab 12.5 mg  12.5 mg Oral Daily Wilner Parker MD   12.5 mg at 06/03/24 1214    fluticasone-vilanterol (BREO ELLIPTA) 100-25 MCG/ACT inhaler 1 puff  1 puff Inhalation Daily Wilner Parker MD   1 puff at 06/03/24 0851    And    umeclidinium (INCRUSE ELLIPTA) 62.5 MCG/ACT inhaler 1 puff  1 puff Inhalation Daily Wilner Parker MD   1 puff at 06/03/24 0851    gabapentin (NEURONTIN) capsule 100 mg  100 mg Oral Q6H PRN Wilner Parker MD   100 mg at 06/03/24 1457    gabapentin (NEURONTIN) capsule 400 mg  400 mg Oral At Bedtime Erin Ferrera MD   400 mg at 06/01/24 2311    guaiFENesin (MUCINEX) 12 hr tablet 1,200 mg  1,200 mg Oral BID PRN Wilner Parker MD        guaiFENesin (MUCINEX) 12 hr tablet 600 mg  600 mg Oral BID [place],  MD Jamila   600 mg at 06/03/24 1222    HOLD MEDICATION   Does not apply HOLD Alvina Garg DO        Hold Medications for ECT (select and list medications to be held)   Does not apply HOLD Tejinder Correa MD        Hold Medications for ECT (select and list medications to be held)   Does not apply HOLD Boo Riley MD        ibuprofen (ADVIL/MOTRIN) tablet 400 mg  400 mg Oral Q6H PRN Alvina Garg DO        Lidocaine (LIDOCARE) 4 % Patch 1 patch  1 patch Transdermal Q24H Lulu Zacarias MD   1 patch at 05/30/24 0835    magnesium oxide (MAG-OX) tablet 400 mg  400 mg Oral BID Anna Brewer PA-C   400 mg at 06/03/24 1221    melatonin tablet 3 mg  3 mg Oral At Bedtime PRN Wilner Parker MD   3 mg at 06/02/24 2303    menthol (Topical Analgesic) 2.5% (BENGAY VANISHING SCENT) 2.5 % topical gel   Topical Q6H PRN Anna Brewer PA-C   Given at 05/31/24 1744    naloxone (NARCAN) injection 0.2 mg  0.2 mg Intravenous Q2 Min PRN Wilner Parker MD        Or    naloxone (NARCAN) injection 0.4 mg  0.4 mg Intravenous Q2 Min PRN Wilner Parker MD        Or    naloxone (NARCAN) injection 0.2 mg  0.2 mg Intramuscular Q2 Min PRN Wilner Parker MD        Or    naloxone (NARCAN) injection 0.4 mg  0.4 mg Intramuscular Q2 Min PRN Wilner Parker MD        OLANZapine (zyPREXA) tablet 2.5 mg  2.5 mg Oral TID PRN Wilner Parker MD        Or    OLANZapine (zyPREXA) injection 2.5 mg  2.5 mg Intramuscular TID PRN Wilner Parker MD        ondansetron (ZOFRAN ODT) ODT tab 4 mg  4 mg Oral Q6H PRN Wilner Parker MD        oxyCODONE (ROXICODONE) tablet 5 mg  5 mg Oral Q6H Nallely Barber MD   5 mg at 06/03/24 1214    And    oxyCODONE (ROXICODONE) tablet 5 mg  5 mg Oral Daily PRN Nallely Barber MD   5 mg at 06/02/24 1320    pantoprazole (PROTONIX) EC tablet 40 mg  40 mg Oral Daily Wilner Parker MD   40 mg at 06/03/24 0850    polyethylene glycol (MIRALAX) Packet 17 g  17 g Oral Daily  PRN Wilner Parker MD        potassium chloride jeannette ER (KLOR-CON M20) CR tablet 20 mEq  20 mEq Oral Daily Wilner Parker MD   20 mEq at 06/03/24 1214    rOPINIRole (REQUIP) tablet 2 mg  2 mg Oral TID Nallely Barber MD   2 mg at 06/03/24 1518    sodium chloride (OCEAN) 0.65 % nasal spray 1 spray  1 spray Both Nostrils Q1H PRN Anna Brewer PA-C        SYNTHROID (Brand only - 75 mcg strength tablets)  150 mcg Oral Once per day on Monday Tuesday Wednesday Thursday Friday Saturday Tejinder Correa MD   150 mcg at 06/03/24 0615    And    SYNTHROID tablet 75 mcg (brand only)  75 mcg Oral Every Sunday Tejinder Correa MD   75 mcg at 06/02/24 0631    zolpidem (AMBIEN) tablet 5 mg  5 mg Oral At Bedtime PRN Wilner Parker MD   5 mg at 05/24/24 0056       PRN:  Current Facility-Administered Medications   Medication Dose Route Frequency Provider Last Rate Last Admin    acetaminophen (TYLENOL) tablet 650 mg  650 mg Oral Q4H PRN Wilner Parker MD   650 mg at 06/03/24 0850    albuterol (PROVENTIL HFA/VENTOLIN HFA) inhaler  2 puff Inhalation Q6H PRN Wilner Parker MD   2 puff at 05/31/24 1741    allopurinol (ZYLOPRIM) tablet 300 mg  300 mg Oral QPM Nallely Barber MD   300 mg at 06/02/24 2019    alum & mag hydroxide-simethicone (MAALOX) suspension 30 mL  30 mL Oral Q4H PRN Wilner Parker MD        ascorbic acid tablet 1,000 mg  1,000 mg Oral Daily Wilner Parker MD   1,000 mg at 06/03/24 1213    benzocaine-menthol (CHLORASEPTIC) 6-10 MG lozenge 1 lozenge  1 lozenge Buccal Q1H PRN Erin Ferrera MD        benzonatate (TESSALON) capsule 200 mg  200 mg Oral TID PRN Wilner Parker MD   200 mg at 06/02/24 1638    cholecalciferol (VITAMIN D3) capsule 250 mcg  250 mcg Oral Weekly Wilner Parker MD   250 mcg at 05/29/24 1300    cyanocobalamin (VITAMIN B-12) sublingual tablet 500 mcg  500 mcg Sublingual Daily Wilner Parker MD   500 mcg at 06/03/24 1214    ethacrynic acid (EDECRIN) half-tab  12.5 mg  12.5 mg Oral Daily Wilner Parker MD   12.5 mg at 06/03/24 1214    fluticasone-vilanterol (BREO ELLIPTA) 100-25 MCG/ACT inhaler 1 puff  1 puff Inhalation Daily Wilner Parker MD   1 puff at 06/03/24 0851    And    umeclidinium (INCRUSE ELLIPTA) 62.5 MCG/ACT inhaler 1 puff  1 puff Inhalation Daily Wilner Parker MD   1 puff at 06/03/24 0851    gabapentin (NEURONTIN) capsule 100 mg  100 mg Oral Q6H PRN Wilner Parker MD   100 mg at 06/03/24 1457    gabapentin (NEURONTIN) capsule 400 mg  400 mg Oral At Bedtime Erin Ferrera MD   400 mg at 06/01/24 2311    guaiFENesin (MUCINEX) 12 hr tablet 1,200 mg  1,200 mg Oral BID PRN Wilner Parker MD        guaiFENesin (MUCINEX) 12 hr tablet 600 mg  600 mg Oral BID Jamila Jason MD   600 mg at 06/03/24 1222    HOLD MEDICATION   Does not apply HOLD Alvina Garg DO        Hold Medications for ECT (select and list medications to be held)   Does not apply HOLD Tejinder Correa MD        Hold Medications for ECT (select and list medications to be held)   Does not apply HOLD Boo Riley MD        ibuprofen (ADVIL/MOTRIN) tablet 400 mg  400 mg Oral Q6H PRN Alvina Garg DO        Lidocaine (LIDOCARE) 4 % Patch 1 patch  1 patch Transdermal Q24H Lulu Zacarias MD   1 patch at 05/30/24 0835    magnesium oxide (MAG-OX) tablet 400 mg  400 mg Oral BID Anna Brewer PA-C   400 mg at 06/03/24 1221    melatonin tablet 3 mg  3 mg Oral At Bedtime PRN Wilner Parker MD   3 mg at 06/02/24 2303    menthol (Topical Analgesic) 2.5% (BENGAY VANISHING SCENT) 2.5 % topical gel   Topical Q6H PRN Anna Brewer PA-C   Given at 05/31/24 1744    naloxone (NARCAN) injection 0.2 mg  0.2 mg Intravenous Q2 Min PRN Wilner Parker MD        Or    naloxone (NARCAN) injection 0.4 mg  0.4 mg Intravenous Q2 Min PRN Wilner Parker MD        Or    naloxone (NARCAN) injection 0.2 mg  0.2 mg Intramuscular Q2 Min PRN Wilner Parker MD         Or    naloxone (NARCAN) injection 0.4 mg  0.4 mg Intramuscular Q2 Min PRN Wilner Parker MD        OLANZapine (zyPREXA) tablet 2.5 mg  2.5 mg Oral TID PRN Wilner Parker MD        Or    OLANZapine (zyPREXA) injection 2.5 mg  2.5 mg Intramuscular TID PRN Wilner Parker MD        ondansetron (ZOFRAN ODT) ODT tab 4 mg  4 mg Oral Q6H PRN Wilner Parker MD        oxyCODONE (ROXICODONE) tablet 5 mg  5 mg Oral Q6H Nallely aBrber MD   5 mg at 06/03/24 1214    And    oxyCODONE (ROXICODONE) tablet 5 mg  5 mg Oral Daily PRN Nallely Barber MD   5 mg at 06/02/24 1320    pantoprazole (PROTONIX) EC tablet 40 mg  40 mg Oral Daily Wilner Parker MD   40 mg at 06/03/24 0850    polyethylene glycol (MIRALAX) Packet 17 g  17 g Oral Daily PRN Wilner Parker MD        potassium chloride jeannette ER (KLOR-CON M20) CR tablet 20 mEq  20 mEq Oral Daily Wilner Parker MD   20 mEq at 06/03/24 1214    rOPINIRole (REQUIP) tablet 2 mg  2 mg Oral TID Nallely Barber MD   2 mg at 06/03/24 1518    sodium chloride (OCEAN) 0.65 % nasal spray 1 spray  1 spray Both Nostrils Q1H PRN Anna Brewer, BRISEYDA        SYNTHROID (Brand only - 75 mcg strength tablets)  150 mcg Oral Once per day on Monday Tuesday Wednesday Thursday Friday Saturday Tejinder Correa MD   150 mcg at 06/03/24 0615    And    SYNTHROID tablet 75 mcg (brand only)  75 mcg Oral Every Sunday Tejinder Correa MD   75 mcg at 06/02/24 0631    zolpidem (AMBIEN) tablet 5 mg  5 mg Oral At Bedtime PRN Wilner Parker MD   5 mg at 05/24/24 0056       Labs and Imaging:  New results:   No results found for this or any previous visit (from the past 24 hour(s)).      Data this admission:  - CBC elevated Hgb 17.7  - CMP unremarkable  - TSH normal  - UDS THC positive  - Hgb A1c normal  - Lipids LDL mildly elevated 103 but otherwise unremarkable  - Vitamin B12 unremarkable  - Folate unremarkable  - Vitamin D unremarkable  - Urinalysis unremarkable  - EKG normal sinus  "rhythm, RBBB QTc 458    Potential Drug Interactions:   Per [employer], combinations of the following drugs has a rating of D, demonstrating that the medications may interact with each other in a clinically significant manner and a patient-specific assessment was made and determined that the benefits outweighed the risks.   - Gabapentin, oxycodone, and Magnesium oxide The following risks include CNS depression.   - Gabapentin and levothyroxine. The following risks include magnesium salts decreasing serum concentration of levothyroxine.     Per [employer], combinations of the following drugs has a rating of C demonstrating that agents may interact in a clinically significant manner but the benefits of the combination of medications often outweigh the risk.   - Ropinirole, oxycodone, and gabapentin. The following risks include CNS depression.   - allopurinol and ethacrynic acid. The following risks include ethacrynic acid increasing concentration of allopurinol.  - ethacrynic acid and oxycodone.  The following risks include oxycodone may enhanced the toxic effects of ethacrynic acid  Treatment team will monitor  for potential side effects and re-evaluate as needed.           Mental Status Exam:     Oriented to:  Grossly Oriented  General:  Awake and Alert  Appearance:  appears stated age and Grooming is adequate  Behavior/Attitude:  cooperative and open  Eye Contact:  appropriate  Psychomotor: normal and no evidence of tics, dystonia, or tardive dyskinesia no catatonia present  Speech:  appropriate volume/tone and talkative  Language: Fluent in English with appropriate syntax and vocabulary.  Mood:  \"not good yesterday\"   Affect:  congruent with mood and anxious  Thought Process:  coherent, goal directed, and circumstantial  Thought Content:  suicidal ideation (passive), No HI, No VH, and No AH; No apparent delusions  Associations:  intact  Insight:  fair due to recognizing the circumstances of her mental health   Judgment:  " fair due to willingness to engage in ECT   Impulse control: fair  Attention Span:  grossly intact  Concentration:  grossly intact  Recent and Remote Memory:  not formally assessed  Fund of Knowledge:  average  Muscle Strength and Tone: normal  Gait and Station: Normal     Psychiatric Assessment     Randi Cleary is a 68 year old female previously diagnosed with MDD, recurrent severe, substance induced mood disorder, Unspecified Trauma, CHAVO, borderline personality disorder and alcohol use disorder who presented voluntarily with SI in the context of treatment resistant MDD. On admission she presented with significant symptoms of depression incuding emotional lability, low mood, hopelessness, amotivation, anxiety, anhedonia, low energy, insomnia low appetite, excess guilt and poor concentration. Her affect was dysphroic anjd axious at times with heavy perseveration on medico-surgical dignosises and passive SI causing incresed anxiety. She notes she has had a plan but no intent. Her history of extensive substance use, medical history and chronic pain contribute biologically. Her presentation is further complicated by borderline personality disorder and history of trauma. Additionally, she has a very limited support system, interpersonal conflicts with friends and her spouse. However her last hospitalization was in the 1980's for a suicide attempt via prozac overdose and has been engaged in treatment and present voluntarily. Her presentation is consistent with decompensated Major Depressive disorder, recurrent severe. Her disorder has been treatment resistant including to TMS and at this time, ECT would be the best course of action to address her symptom severity. Patient warrants inpatient psychiatric hospitalization to maintain her safety.      Psychiatric Plan by Diagnosis      Today's changes:  -Dietician consult      # MDD, recurrent, severe, with anxious distress  - Patient suffers from treatment resistant  depression and has trialed many medications. At this point treatment team will need to do a chart review to review medication trials and determine the best medication to address depressive symptoms long term  - Pt will start ECT 5/24 : Hold high-dose gabapentin/pregabalin after 6pm on the day before ECT (to permit adequate seizure)   - Continue ECT  MWF       #Insomnia  - Zolpidem 5mg qpm prn    Pertinent Labs/Monitoring:   - EKG 5/22 - Qtc 458 NSR, RBBB     Additional Plans:  - Patient will be treated in therapeutic milieu with appropriate individual and group therapies as described     Psychiatric Hospital Course:      Randi Cleary was admitted to [facility] as a voluntary patient for electroconvulsive therapy for lifelong history of depression. Multiple medication trials were not effective. Symptoms notable for low mood, anhedonia, sleep difficulties, guilt and worthlessness, suicidal ideation with plan without intent.     The risks, benefits, alternatives, and side effects were discussed and understood by the patient     Medical Assessment and Plan     Medical diagnoses to be addressed this admission:    # Hypothyroidism  - PTA 150mcg M-Sa, 75mcg on Perez     # KYAW   - Doesn't use CPAP at home, sleep study on 06/2024      # RLS  - Continue PTA Ropinirole 2 mg PO TID  - Gabapentin 400mg qpm  - Magnesium Citrated changed to Magnesium Oxide 400mg BID (per patient and medicine)    #RBBB:  Previously on other EKGs    #Cervical radiculopathy and myelopathy s/p cervical laminoplasty 12/2021    # Chronic Pain  - Continue PTA Roxicodone 5 mg q6h + 5 mg PRN (for max daily dose of 25 mg)  - Menthol 2.5% topical gel q6hrs prn  - TENS Unit     #Dizziness  #Headache  Reports hx of migraines, no vision changes or hearing changes. notes this has been ongoing since she had her spinal surgery.   - CT head 5/22 unremarkable    #Gout  - Nutrition consult  - Allopurinol 300mg qpm  - Flares in left podagra but none  currently    #Vitamin B12 Deficiency  - Vit B12 500mcg qday    #Vitamin C Deficiency  - Ascorbic Acid 1000mg    #Vitamin D Deficiency  - 250mcg weekly    #COPD  - Breo Ellipta 1 puff daily   - Incruse Ellipta 1 puff daily  - Tessalon cap 200mg TID prn  - Albuterol 2 puffs q6hr prn  - Mucinex 1200mg BID prn     #GERD  #Hiatal Hernia  - Protonix 40mg daily    #Possible pulmonary HTN  #HTN  Per Medicine note, pt follows with Cardiology here and has scheduled right heart catheterization for 06/12/2024. Has follow up with Cardiology in clinic scheduled for 06/19/2024. PTA edecrin 12.5mg daily and potassium supplement. Electrolytes and renal function stable.Recently lost 50lbs and BP has improved.   - Ethacrynic Acid 12.5mg qday   -Hold Edecrin on date of ECT, continue 12.5mg daily for now  -Continue potassium supplement   - Klorcon 20meq daily     #Hemochromatosis, hereditary  - Hgb Stable at ~17    #Hepatic Steatosis  - Stable  - LFTs wnl, no abdominal pain, distention, N/V    #History of breast cancer s/p mastectomy, chemo and XRT:   - currently in remission      #Alcohol use disorder, in remission:   - No current use. Two years sober     #Hx of pheochromocytoma s/p left adrenalectomy 08/2017:   - Stable  - Follows Endocrinology for this.     #Psoriasis:  - Noted on R hang  - Uses scalp brush    Medical course: Patient was physically examined by the ED prior to being transferred to the unit and was found to be medically stable and appropriate for admission. Medicine consult was done to provide clearance for ECT. Due to patient's persistent neck pain after surgery in 2021, CT Head was done which was negative. Oxycodone changed from q6hr prn to scheduled with an extra 5mg in the day as needed per PTA regimen. Patient continued to express some nerve pain so gabapentin was increased to 400mg.      Consults:  none    Medical course: Patient was physically examined by the ED prior to being transferred to the unit and was  found to be medically stable and appropriate for admission.     Consults: Medicine for ECT Clearance, ECT Consult, Nutrition     Checklist     Legal Status: Voluntary     Safety Assessment:   Behavioral Orders   Procedures    Code 1 - Restrict to Unit    Code 2 - 1:1 Staff Supervision     For coming down to ECT only    Discontinue 1:1 attendant for suicide risk     Order Specific Question:   I have performed an in person assessment of the patient     Answer:   Based on this assessment the patient no longer requires a one on one attendant at this point in time.     Order Specific Question:   Rationale     Answer:   Patient States able to remain safe in hospital     Order Specific Question:   Rationale     Answer:   Modifications to care environment made to mitigate safety risk     Order Specific Question:   Rationale     Answer:   Routine observations are sufficient to monitor safety.    Electroconvulsive therapy     Series of up to 12 treatments. Begin Date: 5/24/24     Treating Psychiatrist providing ECT:  Ruthann     Notified on:  5/21/24    Electroconvulsive therapy     Series of up to 12 treatments. Begin Date: 5/24/24     Treating Psychiatrist providing ECT:  Ruthann     Notified on:  5/21/24    Fall precautions    Routine Programming     As clinically indicated    Status 15     Every 15 minutes.    Suicide precautions: Suicide Risk: MODERATE; Clinical rationale to override score: lack of access to a plan for self-harm     Order Specific Question:   Suicide Risk     Answer:   MODERATE     Order Specific Question:   Clinical rationale to override score:     Answer:   lack of access to a plan for self-harm       Risk Assessment:  Risk for harm is moderate-high.  Risk factors: SI, maladaptive coping, trauma, and past behaviors  Protective factors: engaged in treatment     SIO: Discontinued    Disposition: Pending stabilization, medication optimization, & development of a safe discharge plan.     Attestations         Patient was seen and discussed with attending Faustino Beckett MD, who agrees with above assessment and plan

## 2024-06-03 NOTE — PROGRESS NOTES
CLINICAL NUTRITION SERVICES - BRIEF NOTE     Nutrition Prescription    Recommendations already ordered by Registered Dietitian (RD):  -Diet order changed to Regular Diet  -Continue all other current orders    Future/Additional Recommendations:  RD to sign off at this time, but will remain available by consult if new nutrition problem arises.       REASON FOR ASSESSMENT  Randi Cleary is a/an 68 year old female assessed by the dietitian for Patient/Family Request - patient would like dinner options for vegetarian.     Findings  RD met with Winnie in the milieu who is requesting to receive write-ins for meat options at dinner time. Writer educated pt on need to change diet to Regular if wanting to be able to order meat and can continue order vegetarian options as desired. Pt is agreeable to diet order change and continuing write-in for green salad, double portions and ginger ale with meals.     INTERVENTIONS  Implementation  Modify composition of meals/snacks      Follow up/Monitoring  RD to sign off at this time, but will remain available by consult if new nutrition problem arises.      Shakira Wetzel, MPH, RDN, LD  Behavioral Health Adult & Pediatric Dietitian  Contact via [employer] or Desk Phone: [phone]

## 2024-06-03 NOTE — PLAN OF CARE
"  Pt went to ECT in the morning. When she came back, pt denies nausea, endorsed mild dizziness, but was able to walk to dining area and had her lunch. Pt was eating and drinking fine.  She is interacting with peers appropriately. Pt is alert and oriented.  No memory issues this shift. Pt Able to verbalize needs and able to express feelings.   Empathic listening was provided. Pt  encouraged to focus on her well being and not on issues around her peers. Pt was receptive. Pt was out and about in the milieu but did not join OT groups.  Pt endorsed anxiety at 7/10, depression at 4/10, Pain at 7/10, denies SIO and Hallucinations. Pt is pleasant and cooperative.       Problem: Sleep Disturbance  Goal: Adequate Sleep/Rest  Outcome: Not Progressing     Problem: Adult Behavioral Health Plan of Care  Goal: Patient-Specific Goal (Individualization)  Description: You can add care plan individualizations to a care plan. Examples of Individualization might be:  \"Parent requests to be called daily at 9am for status\", \"I have a hard time hearing out of my right ear\", or \"Do not touch me to wake me up as it startles  me\".  Outcome: Progressing  Goal: Adheres to Safety Considerations for Self and Others  Outcome: Progressing  Intervention: Develop and Maintain Individualized Safety Plan  Recent Flowsheet Documentation  Taken 6/3/2024 1300 by Yoselin Garcia RN  Safety Measures: safety rounds completed  Goal: Absence of New-Onset Illness or Injury  Outcome: Progressing  Intervention: Identify and Manage Fall Risk  Recent Flowsheet Documentation  Taken 6/3/2024 1300 by Yoselin Garcia RN  Safety Measures: safety rounds completed  Goal: Optimized Coping Skills in Response to Life Stressors  Outcome: Progressing  Intervention: Promote Effective Coping Strategies  Recent Flowsheet Documentation  Taken 6/3/2024 1300 by Yoselin Garcia, RN  Supportive Measures:   active listening utilized   self-care encouraged   " self-reflection promoted   self-responsibility promoted   verbalization of feelings encouraged     Problem: Suicide Risk  Goal: Absence of Self-Harm  Outcome: Progressing  Intervention: Assess Risk to Self and Maintain Safety  Recent Flowsheet Documentation  Taken 6/3/2024 1300 by Yoselin Garcia RN  Self-Harm Prevention: environmental self-harm risks assessed  Intervention: Promote Psychosocial Wellbeing  Recent Flowsheet Documentation  Taken 6/3/2024 1300 by Yoselin Garcia RN  Supportive Measures:   active listening utilized   self-care encouraged   self-reflection promoted   self-responsibility promoted   verbalization of feelings encouraged  Family/Support System Care:   involvement promoted   presence promoted   self-care encouraged  Intervention: Establish Safety Plan and Continuity of Care  Recent Flowsheet Documentation  Taken 6/3/2024 1300 by Yoselin Garcia RN  Safe Transition Promotion: protective factors promoted     Problem: Depression  Goal: Improved Mood  Outcome: Progressing  Intervention: Monitor and Manage Depressive Symptoms  Recent Flowsheet Documentation  Taken 6/3/2024 1300 by Yoselin Garcia RN  Supportive Measures:   active listening utilized   self-care encouraged   self-reflection promoted   self-responsibility promoted   verbalization of feelings encouraged  Family/Support System Care:   involvement promoted   presence promoted   self-care encouraged     Problem: Suicidal Behavior  Goal: Suicidal Behavior is Absent or Managed  Outcome: Progressing  Intervention: Provide Immediate and Ongoing Protective Physical Environment  Recent Flowsheet Documentation  Taken 6/3/2024 1300 by Yoselin Garcia RN  Safe Transition Promotion: protective factors promoted     Problem: Fall Injury Risk  Goal: Absence of Fall and Fall-Related Injury  Outcome: Progressing   Goal Outcome Evaluation:    Plan of Care Reviewed With: patient

## 2024-06-03 NOTE — ANESTHESIA PREPROCEDURE EVALUATION
Anesthesia Pre-Procedure Evaluation    Patient: Randi Cleary   MRN: [id number] : 1955        Procedure : * No procedures listed *          Past Medical History:   Diagnosis Date    Bipolar 2 disorder (H)     Breast cancer (H)     lumpectomy, radiation, chemo    Chronic pain syndrome     COPD (chronic obstructive pulmonary disease) (H)     asthma    Cord compression (H) 2021    Dizzy     Drug tolerance     opioid    Esophageal reflux     Fatigue     Generalized anxiety disorder     Graves disease     Hemochromatosis 2018    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; H63D not detected    History of breast cancer 2020    Formatting of this note might be different from the original. Created by Conversion  Replacement Utility updated for latest IMO load Formatting of this note might be different from the original. Created by Conversion  Replacement Utility updated for latest IMO load    History of corticosteroid therapy 2019    History of partial adrenalectomy (H24) 2019    History of pheochromocytoma 2019    Hx antineoplastic chemotherapy     Hx of radiation therapy     Hyperlipidaemia     Hypertension     Impaired fasting glucose     Injury of neck, whiplash 07/15/2021    Joint pain     KYAW (obstructive sleep apnea) 2016    Osteopenia     Pheochromocytoma, left 2017    laparoscopically removed    Postablative hypothyroidism 1995    Prediabetes 10/03/2019    by A1c    Psoriasis     Psoriatic arthropathy (H)     Right rotator cuff tear     RLS (restless legs syndrome)     on ropinorole    Sacroiliitis (H24)     Serotonin syndrome 2020    [facility] - While on desvenlafaxine 100mg    Snoring     Spinal stenosis     Status post coronary angiogram 10/03/2019    Urinary incontinence     Vitamin B 12 deficiency 2009    Vitamin D deficiency 2010      Past Surgical History:   Procedure Laterality Date    ARTHRODESIS ANKLE      ARTHROPLASTY ANKLE Right 2015     Procedure: ARTHROPLASTY ANKLE;  Surgeon: Jason Coughlin MD;  Location: [facility]    ARTHROPLASTY REVISION ANKLE Right 6/29/2015    Procedure: ARTHROPLASTY REVISION ANKLE;  Surgeon: Jason Coughlin MD;  Location: [facility]    BIOPSY BREAST      BREAST BIOPSY, CORE RT/LT      COLONOSCOPY      COLONOSCOPY N/A 2/25/2021    Procedure: COLONOSCOPY;  Surgeon: Guru Elke Tolbert MD;  Location:  GI    CV CORONARY ANGIOGRAM N/A 10/3/2019    Procedure: CV CORONARY ANGIOGRAM;  Surgeon: Bryce Pierre MD;  Location:  HEART CARDIAC CATH LAB    CV RIGHT HEART CATH MEASUREMENTS RECORDED N/A 10/3/2019    Procedure: CV RIGHT HEART CATH;  Surgeon: Bryce Pierre MD;  Location:  HEART CARDIAC CATH LAB    ESOPHAGOSCOPY, GASTROSCOPY, DUODENOSCOPY (EGD), COMBINED N/A 2/25/2021    Procedure: ESOPHAGOGASTRODUODENOSCOPY, WITH BIOPSY;  Surgeon: Guru Elke Tolbert MD;  Location:  GI    EYE SURGERY  2021    HC REMOVE TONSILS/ADENOIDS,<13 Y/O      Description: Tonsillectomy With Adenoidectomy;  Recorded: 04/07/2010;    IR LUMBAR EPIDURAL STEROID INJECTION  10/26/2004    IR LUMBAR EPIDURAL STEROID INJECTION  11/16/2004    IR LUMBAR EPIDURAL STEROID INJECTION  12/21/2004    IR LUMBAR EPIDURAL STEROID INJECTION  6/8/2006    JOINT REPLACEMENT      LAMINOPLASTY CERVICAL POSTERIOR THREE+ LEVELS Left 12/21/2021    Procedure: CERVICAL 3-CERVICAL 6 LEFT OPEN DOOR LAMINOPLASTY AND LEFT CERVICAL 4-5 AND CERVICAL 6-7 POSTERIOR FORAMINOTOMY;  Surgeon: Angela Gregory MD;  Location: [facility]    LAPAROSCOPIC ADRENALECTOMY Left 08/02/2017    pheochromocytoma    LAPAROSCOPIC ADRENALECTOMY Left 8/2/2017    Procedure: LAPAROSCOPIC LEFT ADRENALECTOMY, ;  Surgeon: Gab Linares MD;  Location: [facility];  Service:     LENGTHEN TENDON ACHILLES Right 6/29/2015    Procedure: LENGTHEN TENDON ACHILLES;  Surgeon: Jason Coughlin MD;  Location: [facility]    LUMPECTOMY BREAST       "LUMPECTOMY BREAST Left 1994    MAMMOPLASTY REDUCTION Right 2015    Petros    MAMMOPLASTY REDUCTION Right     approx late /    MASTECTOMY      left lumpectomy with axillary node dissection    MASTECTOMY MODIFIED RADICAL      OTHER SURGICAL HISTORY Right     reconstructive breast surgery    OTHER SURGICAL HISTORY      Adrenalectomy for pheochromocytoma    AL MASTECTOMY, MODIFIED RADICAL      Description: Modified Radical Mastectomy Left Breast;  Recorded: 2010;    REPAIR HAMMER TOE Right 2015    Procedure: REPAIR HAMMER TOE;  Surgeon: Jason Coughlin MD;  Location: [facility]    TONSILLECTOMY      TONSILLECTOMY & ADENOIDECTOMY      [facility] ARTHRODESIS,ANKLE,OPEN Right     Description: Ankle Arthrodesis;  Recorded: 2010;      Allergies   Allergen Reactions    Serotonin Reuptake Inhibitors (Ssris) Anxiety, Difficulty breathing, Headache, Palpitations and Shortness Of Breath    Buspirone      The patient states she had serotonin syndrome    Cephalexin      Other reaction(s): unknown rxn.    Desvenlafaxine      Serotonin syndrome    Diclofenac Sodium [Diclofenac]      Serotonin syndrome and restless legs syndrome    Gabapentin      Drove on the wrong side of the highway    Levofloxacin      \"CAN'T REMEMBER\"    Penicillins      \"SORES IN MOUTH\"    Riluzole Difficulty breathing and Swelling    Sulfa Antibiotics      \"PT DOES NOT KNOW WHAT THE REACTION WAS\"    Topiramate Other (See Comments)     Frequent urination      Social History     Tobacco Use    Smoking status: Former     Current packs/day: 0.00     Average packs/day: 2.5 packs/day for 29.2 years (72.9 ttl pk-yrs)     Types: Cigarettes     Start date: 1971     Quit date: 2000     Years since quittin.9     Passive exposure: Past    Smokeless tobacco: Never   Substance Use Topics    Alcohol use: Not Currently     Comment: relapse 2021 sober       Wt Readings from Last 1 Encounters:   24 88 kg (194 lb)    "     Anesthesia Evaluation   Pt has had prior anesthetic.     No history of anesthetic complications       ROS/MED HX  ENT/Pulmonary:     (+) sleep apnea, uses CPAP,                        COPD,              Neurologic:       Cardiovascular: Comment: Pulmonary HTN.  Followed by Cardiology.  Next right heart cath scheduled for 6/19/2024.    (+)  hypertension- -   -  - -                                 Previous cardiac testing   Echo: Date: Results:    Stress Test:  Date: Results:    ECG Reviewed:  Date: 5/21/2024 Results:  RBBB, sinus rhythm  Cath:  Date: Results:      METS/Exercise Tolerance:     Hematologic:       Musculoskeletal: Comment: S/P Cervical Surgery      GI/Hepatic: Comment: Hepatic steatosis    (+) GERD,                   Renal/Genitourinary:       Endo: Comment: Graves Disease. S/P Radioiodine Tx.  Hx of pheochromocytoma, S/P Left Adrenalectomy 8/2017    (+)          thyroid problem,     Obesity,       Psychiatric/Substance Use: Comment: MDD, recurrent, severe, with anxious distress      Infectious Disease:       Malignancy:   (+) Malignancy, History of Breast.Breast CA Remission status post Surgery and Chemo.      Other:            Physical Exam    Airway  airway exam normal      Mallampati: II   TM distance: > 3 FB   Neck ROM: full   Mouth opening: > 3 cm    Respiratory Devices and Support         Dental       (+) Minor Abnormalities - some fillings, tiny chips      Cardiovascular   cardiovascular exam normal          Pulmonary   pulmonary exam normal            Other findings: S/P Cervical Surgery, good ROM  OUTSIDE LABS:  CBC:   Lab Results   Component Value Date    WBC 7.1 05/22/2024    WBC 7.9 05/21/2024    HGB 17.7 (H) 05/22/2024    HGB 17.7 (H) 05/21/2024    HCT 49.9 (H) 05/22/2024    HCT 49.6 (H) 05/21/2024     05/22/2024     05/21/2024     BMP:   Lab Results   Component Value Date     05/22/2024     05/21/2024    POTASSIUM 3.8 05/22/2024    POTASSIUM 3.8 05/21/2024     CHLORIDE 104 05/22/2024    CHLORIDE 104 05/21/2024    CO2 24 05/22/2024    CO2 24 05/21/2024    BUN 9.3 05/22/2024    BUN 11.2 05/21/2024    CR 0.57 05/22/2024    CR 0.57 05/21/2024     (H) 05/22/2024    GLC 86 05/21/2024     COAGS:   Lab Results   Component Value Date    PTT 34 12/13/2021    INR 0.94 12/13/2021     POC:   Lab Results   Component Value Date     (H) 02/25/2021     HEPATIC:   Lab Results   Component Value Date    ALBUMIN 4.1 05/22/2024    PROTTOTAL 7.2 05/22/2024    ALT 23 05/22/2024    AST 27 05/22/2024    ALKPHOS 86 05/22/2024    BILITOTAL 0.6 05/22/2024     OTHER:   Lab Results   Component Value Date    A1C 5.6 05/22/2024    LINDA 9.6 05/22/2024    MAG 1.9 05/22/2024    TSH 0.58 05/21/2024    T4 1.49 03/29/2024    T3 114 01/18/2023    CRP <2.9 11/16/2021    SED 8 10/17/2023       Anesthesia Plan    ASA Status:  3    NPO Status:  NPO Appropriate    Anesthesia Type: General.     - Airway: Mask Only   Induction: Intravenous.   Maintenance: N/A.        Consents    Anesthesia Plan(s) and associated risks, benefits, and realistic alternatives discussed. Questions answered and patient/representative(s) expressed understanding.     - Discussed: Risks, Benefits and Alternatives for BOTH SEDATION and the PROCEDURE were discussed     - Discussed with:  Patient      - Extended Intubation/Ventilatory Support Discussed: No.      - Patient is DNR/DNI Status: No     Use of blood products discussed: No .     Postoperative Care            Comments:               Dylan Lemon DO    I have reviewed the pertinent notes and labs in the chart from the past 30 days and (re)examined the patient.  Any updates or changes from those notes are reflected in this note.

## 2024-06-03 NOTE — PLAN OF CARE
Assessment/Intervention/Current Symtoms and Care Coordination:  Chart reviewed and patient met with team,   Discussed patient progress, symptomology, and response to treatment.  Discussed the discharge plan and any potential impediments to discharge.     Patient has completed ECT x4 this morning- without complication.   Patient reported increased teariness yesterday - rated depression 8/10.   visited over the wkend.     Discharge Plan or Goal:  Patient will return home when stable  Psychiatry  55 Plus?     Barriers to Discharge:  Severity of depression/inability to function  Initiation of ECT- unable to do ECT as outpatient as  works- can't provide transportation/care.     Referral Status:  None today     Legal Status:  Voluntary     Contacts:  Outpatient Psychiatrist: Jeannette [facility]    Primary Physician: Laith Constantino  Family Members: Justin Izquierdon (Spouse) [phone]     Upcoming Meetings and Dates/Important Information and next steps:  Team update:  Wed

## 2024-06-03 NOTE — PLAN OF CARE
BEH IP Unit Acuity Rating Score (UARS)  Patient is given one point for every criteria they meet.    CRITERIA SCORING   On a 72 hour hold, court hold, committed, stay of commitment, or revocation. 0    Patient LOS on BEH unit exceeds 20 days. 0  LOS: 13   Patient under guardianship, 55+, otherwise medically complex, or under age 11. 1   Suicide ideation without relief of precipitating factors. 1   Current plan for suicide. 0   Current plan for homicide. 0   Imminent risk or actual attempt to seriously harm another without relief of factors precipitating the attempt. 0   Severe dysfunction in daily living (ex: complete neglect for self care, extreme disruption in vegetative function, extreme deterioration in social interactions). 1   Recent (last 7 days) or current physical aggression in the ED or on unit. 0   Restraints or seclusion episode in past 72 hours. 0   Recent (last 7 days) or current verbal aggression, agitation, yelling, etc., while in the ED or unit. 0   Active psychosis. 0   Need for constant or near constant redirection (from leaving, from others, etc).  0   Intrusive or disruptive behaviors. 0   Patient requires 3 or more hours of individualized nursing care per 8-hour shift (i.e. for ADLs, meds, therapeutic interventions). 0   TOTAL 3

## 2024-06-03 NOTE — PROGRESS NOTES
Pt arrived back from ECT  at about 12:10. Pt  denied  nausea Endorsed neck and back pain at 6/10 and endorsed mild dizziness. Scheduled medications given . Ginger ale and cracker provided as well. VSS.

## 2024-06-03 NOTE — ANESTHESIA CARE TRANSFER NOTE
Patient: Randi Cleary    Procedure: * No procedures listed *       Diagnosis: * No pre-op diagnosis entered *  Diagnosis Additional Information: No value filed.    Anesthesia Type:   General     Note:    Oropharynx: oropharynx clear of all foreign objects  Level of Consciousness: drowsy  Oxygen Supplementation: room air    Independent Airway: airway patency satisfactory and stable  Dentition: dentition unchanged  Vital Signs Stable: post-procedure vital signs reviewed and stable  Report to RN Given: handoff report given  Patient transferred to: PACU    Handoff Report: Identifed the Patient, Identified the Reponsible Provider, Reviewed the pertinent medical history, Discussed the surgical course, Reviewed Intra-OP anesthesia mangement and issues during anesthesia, Set expectations for post-procedure period and Allowed opportunity for questions and acknowledgement of understanding      Vitals:  Vitals Value Taken Time   BP     Temp     Pulse     Resp     SpO2         Electronically Signed By: April Luciano MD  Jenny 3, 2024  11:28 AM

## 2024-06-03 NOTE — ANESTHESIA POSTPROCEDURE EVALUATION
Patient: Randi Cleary    Procedure: * No procedures listed *       Anesthesia Type:  General    Note:  Disposition: Inpatient   Postop Pain Control: Uneventful            Sign Out: Well controlled pain   PONV: No   Neuro/Psych: Uneventful            Sign Out: Acceptable/Baseline neuro status   Airway/Respiratory: Uneventful            Sign Out: Acceptable/Baseline resp. status   CV/Hemodynamics: Uneventful            Sign Out: Acceptable CV status; No obvious hypovolemia; No obvious fluid overload   Other NRE: NONE   DID A NON-ROUTINE EVENT OCCUR? No           Last vitals:  Vitals:    06/02/24 1500 06/02/24 1630 06/03/24 0900   BP: (!) 153/86 118/84 122/80   Pulse: 82 81 93   Resp: 16 16 16   Temp: 36.6  C (97.8  F) 36.4  C (97.5  F) 36.7  C (98  F)   SpO2: 94% 94% 94%       Electronically Signed By: April Luciano MD  Jenny 3, 2024  11:36 AM

## 2024-06-03 NOTE — ANESTHESIA CARE TRANSFER NOTE
Patient: Randi Cleary    Procedure: * No procedures listed *       Diagnosis: * No pre-op diagnosis entered *  Diagnosis Additional Information: No value filed.    Anesthesia Type:   General     Note:    Oropharynx: oropharynx clear of all foreign objects and spontaneously breathing  Level of Consciousness: drowsy  Oxygen Supplementation: room air    Independent Airway: airway patency satisfactory and stable  Dentition: dentition unchanged  Vital Signs Stable: post-procedure vital signs reviewed and stable  Report to RN Given: handoff report given  Patient transferred to: PACU    Handoff Report: Identifed the Patient, Identified the Reponsible Provider, Reviewed the pertinent medical history, Discussed the surgical course, Reviewed Intra-OP anesthesia mangement and issues during anesthesia, Set expectations for post-procedure period and Allowed opportunity for questions and acknowledgement of understanding      Vitals:  Vitals Value Taken Time   /73    Temp 36.5    Pulse 78    Resp 15    SpO2 97        Electronically Signed By: DION Mcrae CRNA  Jenny 3, 2024  11:27 AM

## 2024-06-03 NOTE — PLAN OF CARE
No PRNs given this shift. Pain controlled with  scheduled pain meds.  Pt to have ECT @ 12 pm ; NPO after midnight. Safety checks completed every 15 minutes; no safety concerns noted. Pt appears to have slept for  5.50  hours; will continue to monitor and offer support    Problem: Sleep Disturbance  Goal: Adequate Sleep/Rest  Outcome: Progressing   Goal Outcome Evaluation:

## 2024-06-03 NOTE — PROGRESS NOTES
Pt has met discharge criteria. Vital signs stable. PIV removed without issue. Pt transported to unit via unit staff and report given to hernando carbajal.

## 2024-06-04 LAB
ALBUMIN SERPL BCG-MCNC: 3.7 G/DL (ref 3.5–5.2)
ALP SERPL-CCNC: 80 U/L (ref 40–150)
ALP SERPL-CCNC: ABNORMAL U/L
ALT SERPL W P-5'-P-CCNC: 33 U/L (ref 0–50)
ALT SERPL W P-5'-P-CCNC: ABNORMAL U/L
ANION GAP SERPL CALCULATED.3IONS-SCNC: 14 MMOL/L (ref 7–15)
AST SERPL W P-5'-P-CCNC: 35 U/L (ref 0–45)
AST SERPL W P-5'-P-CCNC: ABNORMAL U/L
BASOPHILS # BLD AUTO: 0 10E3/UL (ref 0–0.2)
BASOPHILS NFR BLD AUTO: 0 %
BILIRUB SERPL-MCNC: 0.4 MG/DL
BUN SERPL-MCNC: 17.3 MG/DL (ref 8–23)
CALCIUM SERPL-MCNC: 9.5 MG/DL (ref 8.8–10.2)
CHLORIDE SERPL-SCNC: 103 MMOL/L (ref 98–107)
CREAT SERPL-MCNC: 0.53 MG/DL (ref 0.51–0.95)
DEPRECATED HCO3 PLAS-SCNC: 21 MMOL/L (ref 22–29)
EGFRCR SERPLBLD CKD-EPI 2021: >90 ML/MIN/1.73M2
EOSINOPHIL # BLD AUTO: 0.2 10E3/UL (ref 0–0.7)
EOSINOPHIL NFR BLD AUTO: 3 %
ERYTHROCYTE [DISTWIDTH] IN BLOOD BY AUTOMATED COUNT: 12.8 % (ref 10–15)
GLUCOSE SERPL-MCNC: 96 MG/DL (ref 70–99)
HCT VFR BLD AUTO: 48.1 % (ref 35–47)
HGB BLD-MCNC: 17 G/DL (ref 11.7–15.7)
IMM GRANULOCYTES # BLD: 0 10E3/UL
IMM GRANULOCYTES NFR BLD: 0 %
LYMPHOCYTES # BLD AUTO: 1.4 10E3/UL (ref 0.8–5.3)
LYMPHOCYTES NFR BLD AUTO: 18 %
MCH RBC QN AUTO: 33.9 PG (ref 26.5–33)
MCHC RBC AUTO-ENTMCNC: 35.3 G/DL (ref 31.5–36.5)
MCV RBC AUTO: 96 FL (ref 78–100)
MONOCYTES # BLD AUTO: 0.8 10E3/UL (ref 0–1.3)
MONOCYTES NFR BLD AUTO: 10 %
NEUTROPHILS # BLD AUTO: 5.5 10E3/UL (ref 1.6–8.3)
NEUTROPHILS NFR BLD AUTO: 69 %
NRBC # BLD AUTO: 0 10E3/UL
NRBC BLD AUTO-RTO: 0 /100
PLATELET # BLD AUTO: 235 10E3/UL (ref 150–450)
POTASSIUM SERPL-SCNC: 4.3 MMOL/L (ref 3.4–5.3)
POTASSIUM SERPL-SCNC: 5.2 MMOL/L (ref 3.4–5.3)
PROT SERPL-MCNC: 7.2 G/DL (ref 6.4–8.3)
RBC # BLD AUTO: 5.01 10E6/UL (ref 3.8–5.2)
SARS-COV-2 RNA RESP QL NAA+PROBE: NEGATIVE
SODIUM SERPL-SCNC: 138 MMOL/L (ref 135–145)
TSH SERPL DL<=0.005 MIU/L-ACNC: 1.69 UIU/ML (ref 0.3–4.2)
WBC # BLD AUTO: 7.9 10E3/UL (ref 4–11)

## 2024-06-04 PROCEDURE — 87635 SARS-COV-2 COVID-19 AMP PRB: CPT

## 2024-06-04 PROCEDURE — 250N000013 HC RX MED GY IP 250 OP 250 PS 637

## 2024-06-04 PROCEDURE — 99232 SBSQ HOSP IP/OBS MODERATE 35: CPT | Mod: GC | Performed by: PSYCHIATRY & NEUROLOGY

## 2024-06-04 PROCEDURE — 36415 COLL VENOUS BLD VENIPUNCTURE: CPT | Performed by: PSYCHIATRY & NEUROLOGY

## 2024-06-04 PROCEDURE — 36415 COLL VENOUS BLD VENIPUNCTURE: CPT

## 2024-06-04 PROCEDURE — 124N000002 HC R&B MH UMMC

## 2024-06-04 PROCEDURE — 90832 PSYTX W PT 30 MINUTES: CPT

## 2024-06-04 PROCEDURE — 85048 AUTOMATED LEUKOCYTE COUNT: CPT

## 2024-06-04 PROCEDURE — 90853 GROUP PSYCHOTHERAPY: CPT

## 2024-06-04 PROCEDURE — 82040 ASSAY OF SERUM ALBUMIN: CPT

## 2024-06-04 PROCEDURE — G0177 OPPS/PHP; TRAIN & EDUC SERV: HCPCS

## 2024-06-04 PROCEDURE — 84132 ASSAY OF SERUM POTASSIUM: CPT | Performed by: PSYCHIATRY & NEUROLOGY

## 2024-06-04 PROCEDURE — 250N000013 HC RX MED GY IP 250 OP 250 PS 637: Performed by: PHYSICIAN ASSISTANT

## 2024-06-04 PROCEDURE — 84443 ASSAY THYROID STIM HORMONE: CPT

## 2024-06-04 RX ADMIN — OXYCODONE HYDROCHLORIDE 5 MG: 5 TABLET ORAL at 06:51

## 2024-06-04 RX ADMIN — ROPINIROLE HYDROCHLORIDE 2 MG: 2 TABLET, FILM COATED ORAL at 20:37

## 2024-06-04 RX ADMIN — OXYCODONE HYDROCHLORIDE 5 MG: 5 TABLET ORAL at 17:24

## 2024-06-04 RX ADMIN — UMECLIDINIUM 1 PUFF: 62.5 AEROSOL, POWDER ORAL at 07:52

## 2024-06-04 RX ADMIN — Medication 12.5 MG: at 07:53

## 2024-06-04 RX ADMIN — MAGNESIUM OXIDE TAB 400 MG (241.3 MG ELEMENTAL MG) 400 MG: 400 (241.3 MG) TAB at 20:38

## 2024-06-04 RX ADMIN — GUAIFENESIN 600 MG: 600 TABLET ORAL at 07:53

## 2024-06-04 RX ADMIN — OXYCODONE HYDROCHLORIDE 5 MG: 5 TABLET ORAL at 20:40

## 2024-06-04 RX ADMIN — ACETAMINOPHEN 650 MG: 325 TABLET, FILM COATED ORAL at 08:00

## 2024-06-04 RX ADMIN — ROPINIROLE HYDROCHLORIDE 2 MG: 2 TABLET, FILM COATED ORAL at 23:11

## 2024-06-04 RX ADMIN — ALLOPURINOL 300 MG: 300 TABLET ORAL at 20:37

## 2024-06-04 RX ADMIN — Medication 3 MG: at 23:10

## 2024-06-04 RX ADMIN — GABAPENTIN 100 MG: 100 CAPSULE ORAL at 15:09

## 2024-06-04 RX ADMIN — PANTOPRAZOLE SODIUM 40 MG: 40 TABLET, DELAYED RELEASE ORAL at 07:53

## 2024-06-04 RX ADMIN — Medication 500 MCG: at 07:52

## 2024-06-04 RX ADMIN — OXYCODONE HYDROCHLORIDE 5 MG: 5 TABLET ORAL at 12:36

## 2024-06-04 RX ADMIN — Medication 1000 MG: at 07:52

## 2024-06-04 RX ADMIN — GUAIFENESIN 600 MG: 600 TABLET ORAL at 20:38

## 2024-06-04 RX ADMIN — OXYCODONE HYDROCHLORIDE 5 MG: 5 TABLET ORAL at 00:10

## 2024-06-04 RX ADMIN — FLUTICASONE FUROATE AND VILANTEROL TRIFENATATE 1 PUFF: 100; 25 POWDER RESPIRATORY (INHALATION) at 07:52

## 2024-06-04 RX ADMIN — MAGNESIUM OXIDE TAB 400 MG (241.3 MG ELEMENTAL MG) 400 MG: 400 (241.3 MG) TAB at 07:52

## 2024-06-04 RX ADMIN — POTASSIUM CHLORIDE 20 MEQ: 1500 TABLET, EXTENDED RELEASE ORAL at 07:54

## 2024-06-04 RX ADMIN — OXYCODONE HYDROCHLORIDE 5 MG: 5 TABLET ORAL at 23:11

## 2024-06-04 RX ADMIN — LEVOTHYROXINE SODIUM 150 MCG: 75 TABLET ORAL at 08:47

## 2024-06-04 ASSESSMENT — ACTIVITIES OF DAILY LIVING (ADL)
ADLS_ACUITY_SCORE: 41
ADLS_ACUITY_SCORE: 41
DRESS: STREET CLOTHES
ADLS_ACUITY_SCORE: 41
ORAL_HYGIENE: INDEPENDENT
ADLS_ACUITY_SCORE: 41
DRESS: INDEPENDENT
ADLS_ACUITY_SCORE: 41
ORAL_HYGIENE: INDEPENDENT
ADLS_ACUITY_SCORE: 41
HYGIENE/GROOMING: INDEPENDENT
ADLS_ACUITY_SCORE: 41
HYGIENE/GROOMING: HANDWASHING;INDEPENDENT
ADLS_ACUITY_SCORE: 41

## 2024-06-04 NOTE — PLAN OF CARE
Assessment/Intervention/Current Symtoms and Care Coordination:  Chart reviewed and patient met with team,   Discussed patient progress, symptomology, and response to treatment.  Discussed the discharge plan and any potential impediments to discharge.     Patient continues course of ECT without complication. Patient does report some improvement.  Patient has continues to actively participate in groups, has been social with peers.  Patient expressed some concerns yesterday about her insurance coverage- writer let patient know I have not heard any issues per UR.       Discharge Plan or Goal:  Patient will return home when stable  Psychiatry  Therapy  55 Plus? Writer will contact program to inquire isf she can return once ECT is completed.     Barriers to Discharge:  Severity of depression/inability to function  Initiation of ECT- unable to do ECT as outpatient as  works- can't provide transportation/care.     Referral Status:  None today     Legal Status:  Voluntary     Contacts:  Outpatient Psychiatrist: Jeannette Adams  Psychiatry  Therapy: Arsenio Wylie  Psychiatry- SLP  Primary Physician: Laith Constantino  Family Members: Justin Cleary (Spouse) [phone]     Upcoming Meetings and Dates/Important Information and next steps:  Team update:  Wed

## 2024-06-04 NOTE — PROGRESS NOTES
Rehab Group    Start time: 1320  End time: 1415  Patient time total: 25 minutes (no charge)    attended partial group    #5 attended   Group Type: occupational therapy   Group Topic Covered: social skills and self-esteem     Group Session Detail:  Self-esteem group game     Patient Response/Contribution:  Cooperative with task, Listened actively, Attentive, and Actively engaged     Patient Detail:    Pt actively participated in the second half (no charge) of a structured occupational therapy group with a focus on facilitating self-esteem via self-reflection questions. Pt shared thoughtful responses regarding past achievements, positive social supports, and hobbies/skills. When prompted to share something she would wish for, she explained wishing she could go back to her life right after high school with all of the knowledge she has now. Shared about going back to school when she was 38. Pleasant and engaged.        No Charge    Patient Active Problem List   Diagnosis    DJD (degenerative joint disease), ankle and foot    Hereditary hemochromatosis (H24)    Pulmonary hypertension (H)    Postablative hypothyroidism    Hx of corticosteroid therapy    Class 2 obesity due to excess calories without serious comorbidity in adult    Prediabetes    KYAW (obstructive sleep apnea)    Type 2 diabetes mellitus without complication, without long-term current use of insulin (H)    Hypokalemia    COPD (chronic obstructive pulmonary disease) (H)    Generalized anxiety disorder    Restless leg syndrome    Vitamin B12 deficiency    Vitamin D deficiency    Morbid obesity (H)    Cord compression (H)    History of cervical spinal surgery    Cervical stenosis of spinal canal    Alcohol use disorder, moderate, in sustained remission (H)    Essential hypertension    Psoriatic arthropathy (H)    Primary osteoarthritis involving multiple joints    Gastroesophageal reflux disease with esophagitis without hemorrhage    Numbness in both hands     Chronic, continuous use of opioids    Trauma and stressor-related disorder    Post-menopausal    Suicidal ideation    Depression with anxiety    Severe recurrent major depression without psychotic features (H)

## 2024-06-04 NOTE — PLAN OF CARE
BEH IP Unit Acuity Rating Score (UARS)  Patient is given one point for every criteria they meet.     CRITERIA SCORING   On a 72 hour hold, court hold, committed, stay of commitment, or revocation. 0    Patient LOS on BEH unit exceeds 20 days. 0  LOS: 14   Patient under guardianship, 55+, otherwise medically complex, or under age 11. 1   Suicide ideation without relief of precipitating factors. 1   Current plan for suicide. 0   Current plan for homicide. 0   Imminent risk or actual attempt to seriously harm another without relief of factors precipitating the attempt. 0   Severe dysfunction in daily living (ex: complete neglect for self care, extreme disruption in vegetative function, extreme deterioration in social interactions). 1   Recent (last 7 days) or current physical aggression in the ED or on unit. 0   Restraints or seclusion episode in past 72 hours. 0   Recent (last 7 days) or current verbal aggression, agitation, yelling, etc., while in the ED or unit. 0   Active psychosis. 0   Need for constant or near constant redirection (from leaving, from others, etc).  0   Intrusive or disruptive behaviors. 0   Patient requires 3 or more hours of individualized nursing care per 8-hour shift (i.e. for ADLs, meds, therapeutic interventions). 0   TOTAL 3

## 2024-06-04 NOTE — DISCHARGE INSTRUCTIONS
Behavioral Discharge Planning and Instructions    Summary:   You were admitted to [facility] on 5/21/24  with worsening depression and anxiety and plan for ECT under the care of  . You met with the Psychiatry team daily for ongoing psychiatric assessment and medication management.  You had opportunities to participate in therapeutic groups on the unit.   At this time you report your mood is stabilizing and you report you are not having thoughts or intent to harm yourself or others. You will be discharged home and will resume care with your outpatient providers.  You underwent ECT with no complications    Disposition:  Home with     Main Diagnosis:   Treatment Resistant Depression      ECT follow up:      You will be starting maintenance ECT starting next Friday 6/28/24              -- Remember to NOT take gabapentin after 6pm the night before each treatment  -- You will have to check to make sure somebody can drive you to and from the hospital and monitor you for 6 hours after each treatment  -- Maintenance ECT starts weekly for several weeks, then goes down to every two weeks, then to once every month.  The goal of maintenance ECT is to space out and eventually stop  -- You cannot drive for the next week.  Then, during maintenance, you can't drive for 24 hours after each treatment.  - We discussed other alternatives including trying ketamine through the [facility] or staying on your regular medications and therapy, but at the moment you were most interested in pursuing [facility] Follow-up:   Appointment: [facility] Program: You are first on the program wait list.  Program staff will contact you next week.  [facility] [address] - [address] [place] [address]  [phone]    Appointment: Jeannette Mendoza [facility] :6/27/24 1:00pm   [facility]: [facility], [address]nd [address], [place] [address]  [phone]    Individual Therapy  appointment: Tuesday, July 2, 2024 @ 9am In-person   Provider: Nickie Garnett [facility]  Location: [facility] [address]  Phone: [phone]  Fax: [phone]  Note: Intake paperwork to be completed at least 48 hours prior to your appointment will be sent via email ([email or web address]). This is the closest location to your residence with a provider that accepts your insurance.      Appointment: Therapy  Date/time:  Wednesday July 10th, 2024 @ 1:00 PM In person  Provider:  DERECK HERRING [facility]   Address: [place] [address]  Phone: [phone]  Fax: [phone]  Note:   This is a 60 minute appointment. Intake paperwork to be completed beforehand will be sent to [email or web address]. Please arrive 30 minutes early if you prefer to complete paperwork in person.       Primary Physician: Laith Constantino      Major Treatments, Procedures and Findings:   Medications were  managed throughout your stay. An internal medicine consult was completed during your stay. You had the opportunity to participate in treatment programming while on the unit including occupational therapy, mental health support and education and spiritual services.     Symptoms to Report:   Please report if you are experiencing increased aggression and/or confusion, problematic loss of sleep, worsening mood, or thoughts of suicide to your treatment team or notify your primary provider.   IF THE SYMPTOMS YOU ARE EXPERIENCING ARE A MEDICAL EMERGENCY, CALL 911 IMMEDIATELY    Lifestyle Adjustment:   1. Adjust your lifestyle to get enough sleep, relaxation, exercise and good nutrition.  Continue to develop healthy coping skills to decrease stress and promote a healthy and sober lifestyle.  2. Abstain from all substances of abuse.  3. Take medications as prescribed.  Please work with your doctor to discuss any concerns you have with your medications or side  effects you may be experiencing.  4. Follow up with appointments as scheduled.      Resources:   [facility] Crisis: [phone]  The [facility] Crisis Response Unit provides 24/7/365 mobile crisis mental health services for adults experiencing a crisis  Mobile crisis interventions are face-to-face, short-term, intensive mental health services. You can contact the Crisis Response Unit by phone at [phone] or by email at [email or web address]   If you are facing a life-threatening emergency, call 9-1-1. If you are in crisis and need help immediately, call the [facility] Crisis Line at [phone].      General Medication Instructions:   See your medication sheet(s) for instructions.   Take all medicines as directed.  Make no changes unless your doctor suggests them.   Go to all your doctor visits.  Be sure to have all your required lab tests. This way, your medicines can be refilled on time.  Do not use any drugs not prescribed by your doctor.    Advance Directives:   Scanned document on file with [employer]? No scanned doc  Is document scanned? Pt states no documents  Honoring Choices Your Rights Handout: Informed and given  Was more information offered? Materials given    The Treatment team has appreciated the opportunity to work with you. If you have any questions or concerns about your recent admission, you can contact the unit which can receive your call 24 hours a day, 7 days a week. They will be able to get in touch with a Provider if needed. The unit number is [phone]

## 2024-06-04 NOTE — PLAN OF CARE
The pt visible out on milieu most of the shift watching TV and social with selected peer, presented less anxious/tearful today, acknowledge improvement in her mood endorsing both depression and anxiety 4/10 and denied of all other MH concern and contracted for safety. The pt appeared listening music overhead phone and playing card alone. Complain back and shoulder pain 8/10 which managed with scheduled med and PRN Oxycodone. Complain intermittent cough, per requested PRN Tessalon given and helpful reported. Appetite good and adequate fluid intake. Appeared attending OT group.  visited and went well. Comply with med and care. PRN Melatonin given for sleep aid.     Problem: Adult Behavioral Health Plan of Care  Goal: Develops/Participates in Therapeutic Crawford to Support Successful Transition  Outcome: Progressing  Intervention: Foster Therapeutic Crawford  Recent Flowsheet Documentation  Taken 6/3/2024 1700 by Miguel Suarez RN  Trust Relationship/Rapport:   care explained                                               choices provided   emotional support provided                         empathic listening provided   questions answered                                      reassurance provided   thoughts/feelings acknowledged     Problem: Depression  Goal: Improved Mood  Outcome: Progressing  Intervention: Monitor and Manage Depressive Symptoms  Recent Flowsheet Documentation  Taken 6/3/2024 1700 by Miguel Suarez RN  Supportive Measures:   active listening utilized                                    self-care encouraged   self-reflection promoted                                  self-responsibility promoted   verbalization of feelings encouraged  Family/Support System Care:   involvement promoted   self-care encouraged     Problem: Anxiety  Goal: Anxiety Reduction or Resolution  Outcome: Progressing  Intervention: Promote Anxiety Reduction  Recent Flowsheet Documentation  Taken 6/3/2024 1700 by Erick  Miguel GOODWIN RN  Supportive Measures:   active listening utilized                                self-care encouraged   self-reflection promoted                             self-responsibility promoted   verbalization of feelings encouraged  Family/Support System Care:   involvement promoted   self-care encouraged   Goal Outcome Evaluation:    Plan of Care Reviewed With: patient

## 2024-06-04 NOTE — PROGRESS NOTES
Rehab Group    Start time: 2000  End time: 2100  Patient time total: 60 minutes    attended full group    #4 attended   Group Type: occupational therapy and OT Clinic   Group Topic Covered: coping skills and activity therapy       Group Session Detail:  OT Clinic     Patient Response/Contribution:  Cooperative with task, Safe use of materials/group supplies, and Organized       Patient Detail:    Pt actively participated in occupational therapy clinic to facilitate coping skill exploration, creative expression within personally meaningful activities, and clinical observation of social, cognitive, and kinesthetic performance skills. Pt response: needed some assistance to initiate, gather materials, sequence, and adjust to workspace demands as needed. Demonstrated fair focus, planning, and problem solving for selected sun catcher task. Able to ask for assistance as needed, and social with peers and staff.  Able to follow multiple step directions. Affect was bright. Pt will continue to be encouraged to attend groups for further asssesssment and to address goals identified on plan of care.         Train & Education Service Per Session 45 + Minutes () OT Group code    Patient Active Problem List   Diagnosis    DJD (degenerative joint disease), ankle and foot    Hereditary hemochromatosis (H24)    Pulmonary hypertension (H)    Postablative hypothyroidism    Hx of corticosteroid therapy    Class 2 obesity due to excess calories without serious comorbidity in adult    Prediabetes    KYAW (obstructive sleep apnea)    Type 2 diabetes mellitus without complication, without long-term current use of insulin (H)    Hypokalemia    COPD (chronic obstructive pulmonary disease) (H)    Generalized anxiety disorder    Restless leg syndrome    Vitamin B12 deficiency    Vitamin D deficiency    Morbid obesity (H)    Cord compression (H)    History of cervical spinal surgery    Cervical stenosis of spinal canal    Alcohol use  disorder, moderate, in sustained remission (H)    Essential hypertension    Psoriatic arthropathy (H)    Primary osteoarthritis involving multiple joints    Gastroesophageal reflux disease with esophagitis without hemorrhage    Numbness in both hands    Chronic, continuous use of opioids    Trauma and stressor-related disorder    Post-menopausal    Suicidal ideation    Depression with anxiety    Severe recurrent major depression without psychotic features (H)

## 2024-06-04 NOTE — PLAN OF CARE
"  Pt presented with a flat affect. She warms upon approach. Pt is able to verbalize needs. She is eating and drinking adequately. She is able to join community meeting and participate in OT group this shift. Pt endorsed anxiety at 4/10 in the morning, denies SI, denies hallucinations and endorsed mild depression. Pt expressed being sad that some of her family members had not sent her any message all the time that she is here in the hospital. Provided pt with emphatic listening and counseling as well, to lower her expectations, so she does not get disappointed.  Her chronic back pain swings from 6-7 and is managed with scheduled medications and PRN. Pt COVID test done. She has expressed that her symptoms are better. Test came back negative.     PRNs : Tylenol     Problem: Adult Behavioral Health Plan of Care  Goal: Patient-Specific Goal (Individualization)  Description: You can add care plan individualizations to a care plan. Examples of Individualization might be:  \"Parent requests to be called daily at 9am for status\", \"I have a hard time hearing out of my right ear\", or \"Do not touch me to wake me up as it startles  me\".  Outcome: Progressing  Goal: Adheres to Safety Considerations for Self and Others  Outcome: Progressing  Intervention: Develop and Maintain Individualized Safety Plan  Recent Flowsheet Documentation  Taken 6/4/2024 1400 by Yoselin Garcia RN  Safety Measures: safety rounds completed  Goal: Absence of New-Onset Illness or Injury  Outcome: Progressing  Intervention: Identify and Manage Fall Risk  Recent Flowsheet Documentation  Taken 6/4/2024 1400 by Yoselin Garcia RN  Safety Measures: safety rounds completed  Goal: Optimized Coping Skills in Response to Life Stressors  Outcome: Progressing  Intervention: Promote Effective Coping Strategies  Recent Flowsheet Documentation  Taken 6/4/2024 1331 by Yoselin Garcia, RN  Supportive Measures:   goal-setting facilitated   " verbalization of feelings encouraged   self-responsibility promoted     Problem: Suicide Risk  Goal: Absence of Self-Harm  Outcome: Progressing  Intervention: Assess Risk to Self and Maintain Safety  Recent Flowsheet Documentation  Taken 6/4/2024 1400 by Yoselin Garcia RN  Self-Harm Prevention: environmental self-harm risks assessed  Taken 6/4/2024 1331 by Yoselin Garcia RN  Self-Harm Prevention: environmental self-harm risks assessed  Intervention: Promote Psychosocial Wellbeing  Recent Flowsheet Documentation  Taken 6/4/2024 1331 by Yoselin Garcia RN  Supportive Measures:   goal-setting facilitated   verbalization of feelings encouraged   self-responsibility promoted  Family/Support System Care:   involvement promoted   self-care encouraged  Intervention: Establish Safety Plan and Continuity of Care  Recent Flowsheet Documentation  Taken 6/4/2024 1400 by Yoselin Garcia RN  Safe Transition Promotion: protective factors promoted     Problem: Depression  Goal: Improved Mood  Outcome: Progressing  Intervention: Monitor and Manage Depressive Symptoms  Recent Flowsheet Documentation  Taken 6/4/2024 1331 by Yoselin Garcia RN  Supportive Measures:   goal-setting facilitated   verbalization of feelings encouraged   self-responsibility promoted  Family/Support System Care:   involvement promoted   self-care encouraged     Problem: Anxiety  Goal: Anxiety Reduction or Resolution  Outcome: Progressing  Intervention: Promote Anxiety Reduction  Recent Flowsheet Documentation  Taken 6/4/2024 1331 by Yoselin Garcia RN  Supportive Measures:   goal-setting facilitated   verbalization of feelings encouraged   self-responsibility promoted  Family/Support System Care:   involvement promoted   self-care encouraged   Goal Outcome Evaluation:    Plan of Care Reviewed With: patient

## 2024-06-04 NOTE — PLAN OF CARE
"Individual Therapy Note      Date of Service: June 4, 2024    Patient: Winnie goes by \"Winnie,\" uses she/her pronouns    Individuals Present: Winnie ROSY Newby    Session start: 1320  Session end: 1350  Session duration in minutes: 30      Modality Used: Person Centered, Brief Therapy, and Solution Focused    Goals: Engage in safety planning, process feelings around conflict with another patient     Patient Description of current symptoms: Better, not as upset about conflict, has perspective on why she is here.     Mental Status Exam:   Attitude: cooperative  Eye Contact: good  Mood: sad , depressed, and good  Affect: appropriate and in normal range, mood congruent  Speech: clear, coherent  Psychomotor Behavior: no evidence of tardive dyskinesia, dystonia, or tics  Thought Process:  logical and linear  Associations: no loose associations  Thought Content: no evidence of suicidal ideation or homicidal ideation  Insight: good  Judgement: fair  Attention Span and Concentration: intact    Pt progress: Focus of session was to assess safety and engage in safety planning. Pt was cooperative, insightful, tangential, each answer Pt connected back to negative memories. Dicussed warning signs that a crisis may be developing which includes anger/rage, not taking care of herself. Reported coping skills include: spending time with her cat and , swimming, gardening. Processed conflict with another patient. Pt reports she was upset by it this weekend but can now see that other patients are going through their own issues, and that Pt should focus on herself. Writer commended Pt on her insight. Pt expressed appreciation for 1:1 session.      Treatment Objective(s) Addressed:   The focus of this session was on identifying and practicing coping strategies, safety planning, and building distress tolerance     Progress Towards Goals and Assessment of Patient:   Patient is making progress towards treatment goals as " evidenced by improved mood and perspective, less anger.       Therapeutic Intervention(s):   Provided active listening, unconditional positive regard, and validation.   Engaged in safety planning identifying coping skills, warning signs, health support and resources and Identified and practiced coping skills      Plan/next step: Writer will continue to check in with Pt, encouraged Pt to attend group sessions.      15068 - Psychotherapy (with patient) - 30 (16-37*) min    Patient Active Problem List   Diagnosis    DJD (degenerative joint disease), ankle and foot    Hereditary hemochromatosis (H24)    Pulmonary hypertension (H)    Postablative hypothyroidism    Hx of corticosteroid therapy    Class 2 obesity due to excess calories without serious comorbidity in adult    Prediabetes    KYAW (obstructive sleep apnea)    Type 2 diabetes mellitus without complication, without long-term current use of insulin (H)    Hypokalemia    COPD (chronic obstructive pulmonary disease) (H)    Generalized anxiety disorder    Restless leg syndrome    Vitamin B12 deficiency    Vitamin D deficiency    Morbid obesity (H)    Cord compression (H)    History of cervical spinal surgery    Cervical stenosis of spinal canal    Alcohol use disorder, moderate, in sustained remission (H)    Essential hypertension    Psoriatic arthropathy (H)    Primary osteoarthritis involving multiple joints    Gastroesophageal reflux disease with esophagitis without hemorrhage    Numbness in both hands    Chronic, continuous use of opioids    Trauma and stressor-related disorder    Post-menopausal    Suicidal ideation    Depression with anxiety    Severe recurrent major depression without psychotic features (H)

## 2024-06-04 NOTE — PLAN OF CARE
Group Attendance:  attended full group    Time session began: 1420  Time session ended: 1500  Patient's total time in group: 40    Total # Attendees   4   Group Type psychotherapeutic and life skills     Group Topic Covered healthy coping skills      Group Session Detail Discussed types of coping strategies, created list of personal coping skills that work, do not work, and want to learn.      Patient's response to the group topic/interactions:  Listened actively, Expressed understanding of topic, and Actively engaged     Patient Details: Pt checked in feeling emotional, shared a goal to do improvements to her own. Pt was tearful at times and expressed gratitude towards peers for their support. Reported coping skills include music, swimming, and spending time with her cat.           02315 - Group psychotherapy - 1 Session    Patient Active Problem List   Diagnosis    DJD (degenerative joint disease), ankle and foot    Hereditary hemochromatosis (H24)    Pulmonary hypertension (H)    Postablative hypothyroidism    Hx of corticosteroid therapy    Class 2 obesity due to excess calories without serious comorbidity in adult    Prediabetes    KYAW (obstructive sleep apnea)    Type 2 diabetes mellitus without complication, without long-term current use of insulin (H)    Hypokalemia    COPD (chronic obstructive pulmonary disease) (H)    Generalized anxiety disorder    Restless leg syndrome    Vitamin B12 deficiency    Vitamin D deficiency    Morbid obesity (H)    Cord compression (H)    History of cervical spinal surgery    Cervical stenosis of spinal canal    Alcohol use disorder, moderate, in sustained remission (H)    Essential hypertension    Psoriatic arthropathy (H)    Primary osteoarthritis involving multiple joints    Gastroesophageal reflux disease with esophagitis without hemorrhage    Numbness in both hands    Chronic, continuous use of opioids    Trauma and stressor-related disorder    Post-menopausal     Suicidal ideation    Depression with anxiety    Severe recurrent major depression without psychotic features (H)

## 2024-06-04 NOTE — PLAN OF CARE
No PRNs given or requested this shift. Pt's pain controlled with scheduled pain meds. Safety checks completed every 15 minutes; no safety concerns noted. Pt appears to have slept for  5.75 hours; will continue to monitor and offer support.     Problem: Sleep Disturbance  Goal: Adequate Sleep/Rest  Outcome: Progressing   Goal Outcome Evaluation:

## 2024-06-04 NOTE — PLAN OF CARE
Problem: Adult Inpatient Plan of Care  Goal: Absence of Hospital-Acquired Illness or Injury  Outcome: Progressing  Intervention: Prevent Skin Injury  Recent Flowsheet Documentation  Taken 6/4/2024 1654 by Yahir Zamora RN  Body Position: position changed independently     Problem: Adult Inpatient Plan of Care  Goal: Optimal Comfort and Wellbeing  Intervention: Monitor Pain and Promote Comfort  Recent Flowsheet Documentation  Taken 6/4/2024 1654 by Yahir Zamora RN  Pain Management Interventions: cold applied  Intervention: Provide Person-Centered Care  Recent Flowsheet Documentation  Taken 6/4/2024 1700 by Yahir Zamora RN  Trust Relationship/Rapport:   care explained   choices provided   emotional support provided   empathic listening provided   questions answered   questions encouraged   reassurance provided   thoughts/feelings acknowledged     Problem: Suicide Risk  Goal: Absence of Self-Harm  Outcome: Progressing     Problem: Depression  Goal: Improved Mood  Outcome: Progressing   Goal Outcome Evaluation:    Plan of Care Reviewed With: patient         Alert and oriented, able to communicate needs and express feelings. Visible in milieu, patient was interactive with peers and staff members. Pleasant, cooperative and medication compliant. Anxiety 8, depression 5, denied suicidal ideation and contracted for safety. ADLs WNL, shower completed, back/neck and shoulders pain managed with scheduled and PRN Oxycodone. Food and fluid intake WNL. Able to participate in group therapy. Patient aware  ECT tomorrow at 0720. TENS on for the night. Melatonin PRN at HS.

## 2024-06-04 NOTE — PROGRESS NOTES
"  ----------------------------------------------------------------------------------------------------------  [facility]  Psychiatry Progress Note  Hospital Day #14     Interim History:     The patient's care was discussed with the treatment team and chart notes were reviewed.  Sleep: 5.75 hours (06/04/24 0600)  Staff Report:  No acute events. Visited with her , attended group sessions. Reports feeling better than yesterday.      Last 24H PRN:     acetaminophen (TYLENOL) tablet 650 mg, 650 mg at 06/04/24 0800    benzonatate (TESSALON) capsule 200 mg, 200 mg at 06/03/24 2153    gabapentin (NEURONTIN) capsule 100 mg, 100 mg at 06/04/24 1509    melatonin tablet 3 mg, 3 mg at 06/03/24 2301    oxyCODONE (ROXICODONE) tablet 5 mg, 5 mg at 06/04/24 1724 **AND** [COMPLETED] oxyCODONE (ROXICODONE) tablet 5 mg, 5 mg at 05/21/24 2320 **AND** oxyCODONE (ROXICODONE) tablet 5 mg, 5 mg at 06/04/24 2040         Subjective:     Patient reports she feels \"okay\" \"teary inside\". Said she was exhausted after ECT yesterday. Reports that her anesthesiologist talked to her about the incident that happened on Friday. She shared how they gave muscle relaxant before sedative and she was aware of being paralyzed.  \"I have reservations about going home.. I am projecting I guess\". She is sad because she hasn't been able to do things she normally does. For example, she hasn't been able to finish cooking a meal due to neck and shoulder pain. She also expresses that her  hasn't always been supportive of her when she cries at home. She talked to a girlfriend about trying new medication here since she has support and she feels she is not there yet but thinking it might be a good thing. Feels she is not as \"angry inside\" after receiving ECT.  Worries about whether her therapist will continue working with her. Feels warm and diaphoretic at times, wonders it it's her thyroid medication. " "Discussed potential dispo depending on her progression after ECT treatments this week.    ROS:  Patient has pain in her neck and shoulders, cold sweats  Patient denies acute concerns     Objective:     Vitals:  /75 (Patient Position: Sitting)   Pulse 75   Temp 97.4  F (36.3  C) (Temporal)   Resp 16   Ht 1.676 m (5' 6\")   Wt 88 kg (194 lb)   LMP  (LMP Unknown)   SpO2 95%   BMI 31.31 kg/m      Allergies:  Allergies   Allergen Reactions    Serotonin Reuptake Inhibitors (Ssris) Anxiety, Difficulty breathing, Headache, Palpitations and Shortness Of Breath    Buspirone      The patient states she had serotonin syndrome    Cephalexin      Other reaction(s): unknown rxn.    Desvenlafaxine      Serotonin syndrome    Diclofenac Sodium [Diclofenac]      Serotonin syndrome and restless legs syndrome    Gabapentin      Drove on the wrong side of the highway    Levofloxacin      \"CAN'T REMEMBER\"    Penicillins      \"SORES IN MOUTH\"    Riluzole Difficulty breathing and Swelling    Sulfa Antibiotics      \"PT DOES NOT KNOW WHAT THE REACTION WAS\"    Topiramate Other (See Comments)     Frequent urination       Current Medications:  Scheduled:  Current Facility-Administered Medications   Medication Dose Route Frequency Provider Last Rate Last Admin    acetaminophen (TYLENOL) tablet 650 mg  650 mg Oral Q4H PRN Wilner Parker MD   650 mg at 06/04/24 0800    albuterol (PROVENTIL HFA/VENTOLIN HFA) inhaler  2 puff Inhalation Q6H PRN Wilner Parker MD   2 puff at 05/31/24 1741    allopurinol (ZYLOPRIM) tablet 300 mg  300 mg Oral QPM Nallely Barber MD   300 mg at 06/04/24 2037    alum & mag hydroxide-simethicone (MAALOX) suspension 30 mL  30 mL Oral Q4H PRN Wilner Parker MD        ascorbic acid tablet 1,000 mg  1,000 mg Oral Daily Wilner Parker MD   1,000 mg at 06/04/24 3582    benzocaine-menthol (CHLORASEPTIC) 6-10 MG lozenge 1 lozenge  1 lozenge Buccal Q1H PRN Erin Ferrera MD        benzonatate (TESSALON) " capsule 200 mg  200 mg Oral TID PRN Wilner Parker MD   200 mg at 06/03/24 2153    cholecalciferol (VITAMIN D3) capsule 250 mcg  250 mcg Oral Weekly Wilner Parker MD   250 mcg at 05/29/24 1300    cyanocobalamin (VITAMIN B-12) sublingual tablet 500 mcg  500 mcg Sublingual Daily Wilner Parker MD   500 mcg at 06/04/24 0752    ethacrynic acid (EDECRIN) half-tab 12.5 mg  12.5 mg Oral Daily Wilner Parker MD   12.5 mg at 06/04/24 0753    fluticasone-vilanterol (BREO ELLIPTA) 100-25 MCG/ACT inhaler 1 puff  1 puff Inhalation Daily Wilner Parker MD   1 puff at 06/04/24 0752    And    umeclidinium (INCRUSE ELLIPTA) 62.5 MCG/ACT inhaler 1 puff  1 puff Inhalation Daily Wilner Parker MD   1 puff at 06/04/24 0752    gabapentin (NEURONTIN) capsule 100 mg  100 mg Oral Q6H PRN Wilner Parker MD   100 mg at 06/04/24 1509    gabapentin (NEURONTIN) capsule 400 mg  400 mg Oral At Bedtime Erin Ferrera MD   400 mg at 06/03/24 2301    guaiFENesin (MUCINEX) 12 hr tablet 1,200 mg  1,200 mg Oral BID PRN Wilner Parker MD        guaiFENesin (MUCINEX) 12 hr tablet 600 mg  600 mg Oral BID Jamila Jason MD   600 mg at 06/04/24 2038    HOLD MEDICATION   Does not apply HOLD Alvina Garg DO        Hold Medications for ECT (select and list medications to be held)   Does not apply HOLD Tejinder Correa MD        Hold Medications for ECT (select and list medications to be held)   Does not apply HOLD Boo Riley MD        ibuprofen (ADVIL/MOTRIN) tablet 400 mg  400 mg Oral Q6H PRN Alvina Garg DO        Lidocaine (LIDOCARE) 4 % Patch 1 patch  1 patch Transdermal Q24H Lulu Zacarias MD   1 patch at 05/30/24 0835    magnesium oxide (MAG-OX) tablet 400 mg  400 mg Oral BID Anna Brewer PA-C   400 mg at 06/04/24 2038    melatonin tablet 3 mg  3 mg Oral At Bedtime PRN Wilner Parker MD   3 mg at 06/03/24 8592    menthol (Topical Analgesic) 2.5% (BENGAY VANISHING SCENT) 2.5 %  topical gel   Topical Q6H PRN Anna Brewer PA-C   Given at 05/31/24 1744    naloxone (NARCAN) injection 0.2 mg  0.2 mg Intravenous Q2 Min PRN Wilner Parker MD        Or    naloxone (NARCAN) injection 0.4 mg  0.4 mg Intravenous Q2 Min PRN Wilner Parker MD        Or    naloxone (NARCAN) injection 0.2 mg  0.2 mg Intramuscular Q2 Min PRN Wilner Parker MD        Or    naloxone (NARCAN) injection 0.4 mg  0.4 mg Intramuscular Q2 Min PRN Wilner Parker MD        OLANZapine (zyPREXA) tablet 2.5 mg  2.5 mg Oral TID PRN Wilner Parker MD        Or    OLANZapine (zyPREXA) injection 2.5 mg  2.5 mg Intramuscular TID PRN Wilner Parker MD        ondansetron (ZOFRAN ODT) ODT tab 4 mg  4 mg Oral Q6H PRN Wilner Parker MD        oxyCODONE (ROXICODONE) tablet 5 mg  5 mg Oral Q6H Nallely Barber MD   5 mg at 06/04/24 1724    And    oxyCODONE (ROXICODONE) tablet 5 mg  5 mg Oral Daily PRN Nallely Barber MD   5 mg at 06/04/24 2040    pantoprazole (PROTONIX) EC tablet 40 mg  40 mg Oral Daily Wilner Parker MD   40 mg at 06/04/24 0753    polyethylene glycol (MIRALAX) Packet 17 g  17 g Oral Daily PRN Wilner Parker MD        potassium chloride jeannette ER (KLOR-CON M20) CR tablet 20 mEq  20 mEq Oral Daily Wilner Parker MD   20 mEq at 06/04/24 0754    rOPINIRole (REQUIP) tablet 2 mg  2 mg Oral TID Nallely Barber MD   2 mg at 06/04/24 2037    sodium chloride (OCEAN) 0.65 % nasal spray 1 spray  1 spray Both Nostrils Q1H PRN Anan Brewer PA-C        SYNTHROID (Brand only - 75 mcg strength tablets)  150 mcg Oral Once per day on Monday Tuesday Wednesday Thursday Friday Saturday Tejinder Correa MD   150 mcg at 06/04/24 0847    And    SYNTHROID tablet 75 mcg (brand only)  75 mcg Oral Every Sunday Tejinder Correa MD   75 mcg at 06/02/24 0631    zolpidem (AMBIEN) tablet 5 mg  5 mg Oral At Bedtime PRN Wilner Parker MD   5 mg at 05/24/24 0056       PRN:  Current Facility-Administered  Medications   Medication Dose Route Frequency Provider Last Rate Last Admin    acetaminophen (TYLENOL) tablet 650 mg  650 mg Oral Q4H PRN Wilner Parker MD   650 mg at 06/04/24 0800    albuterol (PROVENTIL HFA/VENTOLIN HFA) inhaler  2 puff Inhalation Q6H PRN Wilner Parker MD   2 puff at 05/31/24 1741    allopurinol (ZYLOPRIM) tablet 300 mg  300 mg Oral QPM Nallely Barber MD   300 mg at 06/04/24 2037    alum & mag hydroxide-simethicone (MAALOX) suspension 30 mL  30 mL Oral Q4H PRN Wilner Parker MD        ascorbic acid tablet 1,000 mg  1,000 mg Oral Daily Wilner Parker MD   1,000 mg at 06/04/24 0752    benzocaine-menthol (CHLORASEPTIC) 6-10 MG lozenge 1 lozenge  1 lozenge Buccal Q1H PRN Erin Ferrera MD        benzonatate (TESSALON) capsule 200 mg  200 mg Oral TID PRN Wilner Parker MD   200 mg at 06/03/24 2153    cholecalciferol (VITAMIN D3) capsule 250 mcg  250 mcg Oral Weekly Wilner Parker MD   250 mcg at 05/29/24 1300    cyanocobalamin (VITAMIN B-12) sublingual tablet 500 mcg  500 mcg Sublingual Daily Wilner Parker MD   500 mcg at 06/04/24 0752    ethacrynic acid (EDECRIN) half-tab 12.5 mg  12.5 mg Oral Daily Wilner Parker MD   12.5 mg at 06/04/24 0753    fluticasone-vilanterol (BREO ELLIPTA) 100-25 MCG/ACT inhaler 1 puff  1 puff Inhalation Daily Wilner Parker MD   1 puff at 06/04/24 0752    And    umeclidinium (INCRUSE ELLIPTA) 62.5 MCG/ACT inhaler 1 puff  1 puff Inhalation Daily Wilner Parker MD   1 puff at 06/04/24 0752    gabapentin (NEURONTIN) capsule 100 mg  100 mg Oral Q6H PRN Wilner Parker MD   100 mg at 06/04/24 1509    gabapentin (NEURONTIN) capsule 400 mg  400 mg Oral At Bedtime Erin Ferrera MD   400 mg at 06/03/24 2301    guaiFENesin (MUCINEX) 12 hr tablet 1,200 mg  1,200 mg Oral BID PRN Wilner Parker MD        guaiFENesin (MUCINEX) 12 hr tablet 600 mg  600 mg Oral BID Jamila Jason MD   600 mg at 06/04/24 2038    HOLD MEDICATION    Does not apply HOLD Alvina Garg DO        Hold Medications for ECT (select and list medications to be held)   Does not apply HOLD Tejinder Correa MD        Hold Medications for ECT (select and list medications to be held)   Does not apply HOLD Boo Riley MD        ibuprofen (ADVIL/MOTRIN) tablet 400 mg  400 mg Oral Q6H PRN Alvina Garg DO        Lidocaine (LIDOCARE) 4 % Patch 1 patch  1 patch Transdermal Q24H Lulu Zacarias MD   1 patch at 05/30/24 0835    magnesium oxide (MAG-OX) tablet 400 mg  400 mg Oral BID Anna Brewer PA-C   400 mg at 06/04/24 2038    melatonin tablet 3 mg  3 mg Oral At Bedtime PRN Wilner Parker MD   3 mg at 06/03/24 2301    menthol (Topical Analgesic) 2.5% (BENGAY VANISHING SCENT) 2.5 % topical gel   Topical Q6H PRN Anna Brewer PA-C   Given at 05/31/24 1744    naloxone (NARCAN) injection 0.2 mg  0.2 mg Intravenous Q2 Min PRN Wilner Parker MD        Or    naloxone (NARCAN) injection 0.4 mg  0.4 mg Intravenous Q2 Min PRN Wilner Parker MD        Or    naloxone (NARCAN) injection 0.2 mg  0.2 mg Intramuscular Q2 Min PRN Wilner Parker MD        Or    naloxone (NARCAN) injection 0.4 mg  0.4 mg Intramuscular Q2 Min PRN Wilner Parker MD        OLANZapine (zyPREXA) tablet 2.5 mg  2.5 mg Oral TID PRN Wilner Parker MD        Or    OLANZapine (zyPREXA) injection 2.5 mg  2.5 mg Intramuscular TID PRN Wilner Parker MD        ondansetron (ZOFRAN ODT) ODT tab 4 mg  4 mg Oral Q6H PRN Wilner Parker MD        oxyCODONE (ROXICODONE) tablet 5 mg  5 mg Oral Q6H Nallely Barber MD   5 mg at 06/04/24 1724    And    oxyCODONE (ROXICODONE) tablet 5 mg  5 mg Oral Daily PRN Nallely Barber MD   5 mg at 06/04/24 2040    pantoprazole (PROTONIX) EC tablet 40 mg  40 mg Oral Daily Wilner Parker MD   40 mg at 06/04/24 5333    polyethylene glycol (MIRALAX) Packet 17 g  17 g Oral Daily PRN Wilner Parker MD        potassium chloride jeannette ER  (KLOR-CON M20) CR tablet 20 mEq  20 mEq Oral Daily Wilner Parker MD   20 mEq at 06/04/24 0754    rOPINIRole (REQUIP) tablet 2 mg  2 mg Oral TID Nallely Barber MD   2 mg at 06/04/24 2037    sodium chloride (OCEAN) 0.65 % nasal spray 1 spray  1 spray Both Nostrils Q1H PRN Anna Brewer PA-C        SYNTHROID (Brand only - 75 mcg strength tablets)  150 mcg Oral Once per day on Monday Tuesday Wednesday Thursday Friday Saturday Tejinder Correa MD   150 mcg at 06/04/24 0847    And    SYNTHROID tablet 75 mcg (brand only)  75 mcg Oral Every Sunday Tejinder Correa MD   75 mcg at 06/02/24 0631    zolpidem (AMBIEN) tablet 5 mg  5 mg Oral At Bedtime PRN Wilner Parker MD   5 mg at 05/24/24 0056       Labs and Imaging:  New results:   Recent Results (from the past 24 hour(s))   Symptomatic COVID-19 Virus (Coronavirus) by PCR Nasopharyngeal    Collection Time: 06/04/24  8:58 AM    Specimen: Nasopharyngeal; Swab   Result Value Ref Range    SARS CoV2 PCR Negative Negative   TSH with free T4 reflex    Collection Time: 06/04/24 12:26 PM   Result Value Ref Range    TSH 1.69 0.30 - 4.20 uIU/mL   Comprehensive metabolic panel    Collection Time: 06/04/24 12:26 PM   Result Value Ref Range    Sodium 138 135 - 145 mmol/L    Potassium 5.2 3.4 - 5.3 mmol/L    Carbon Dioxide (CO2) 21 (L) 22 - 29 mmol/L    Anion Gap 14 7 - 15 mmol/L    Urea Nitrogen 17.3 8.0 - 23.0 mg/dL    Creatinine 0.53 0.51 - 0.95 mg/dL    GFR Estimate >90 >60 mL/min/1.73m2    Calcium 9.5 8.8 - 10.2 mg/dL    Chloride 103 98 - 107 mmol/L    Glucose 96 70 - 99 mg/dL    Alkaline Phosphatase      AST      ALT      Protein Total 7.2 6.4 - 8.3 g/dL    Albumin 3.7 3.5 - 5.2 g/dL    Bilirubin Total 0.4 <=1.2 mg/dL   CBC with platelets and differential    Collection Time: 06/04/24 12:26 PM   Result Value Ref Range    WBC Count 7.9 4.0 - 11.0 10e3/uL    RBC Count 5.01 3.80 - 5.20 10e6/uL    Hemoglobin 17.0 (H) 11.7 - 15.7 g/dL    Hematocrit 48.1 (H) 35.0 - 47.0 %     MCV 96 78 - 100 fL    MCH 33.9 (H) 26.5 - 33.0 pg    MCHC 35.3 31.5 - 36.5 g/dL    RDW 12.8 10.0 - 15.0 %    Platelet Count 235 150 - 450 10e3/uL    % Neutrophils 69 %    % Lymphocytes 18 %    % Monocytes 10 %    % Eosinophils 3 %    % Basophils 0 %    % Immature Granulocytes 0 %    NRBCs per 100 WBC 0 <1 /100    Absolute Neutrophils 5.5 1.6 - 8.3 10e3/uL    Absolute Lymphocytes 1.4 0.8 - 5.3 10e3/uL    Absolute Monocytes 0.8 0.0 - 1.3 10e3/uL    Absolute Eosinophils 0.2 0.0 - 0.7 10e3/uL    Absolute Basophils 0.0 0.0 - 0.2 10e3/uL    Absolute Immature Granulocytes 0.0 <=0.4 10e3/uL    Absolute NRBCs 0.0 10e3/uL   AST    Collection Time: 06/04/24  4:52 PM   Result Value Ref Range    AST 35 0 - 45 U/L   ALT    Collection Time: 06/04/24  4:52 PM   Result Value Ref Range    ALT 33 0 - 50 U/L   Alkaline phosphatase    Collection Time: 06/04/24  4:52 PM   Result Value Ref Range    Alkaline Phosphatase 80 40 - 150 U/L   Potassium    Collection Time: 06/04/24  4:52 PM   Result Value Ref Range    Potassium 4.3 3.4 - 5.3 mmol/L         Data this admission:  - CBC elevated Hgb 17.7  - CMP unremarkable  - TSH normal  - UDS THC positive  - Hgb A1c normal  - Lipids LDL mildly elevated 103 but otherwise unremarkable  - Vitamin B12 unremarkable  - Folate unremarkable  - Vitamin D unremarkable  - Urinalysis unremarkable  - EKG normal sinus rhythm, RBBB QTc 458    Potential Drug Interactions:   Per [employer], combinations of the following drugs has a rating of D, demonstrating that the medications may interact with each other in a clinically significant manner and a patient-specific assessment was made and determined that the benefits outweighed the risks.   - Gabapentin, oxycodone, and Magnesium oxide The following risks include CNS depression.   - Gabapentin and levothyroxine. The following risks include magnesium salts decreasing serum concentration of levothyroxine.     Per [employer], combinations of the following drugs has a rating  "of C demonstrating that agents may interact in a clinically significant manner but the benefits of the combination of medications often outweigh the risk.   - Ropinirole, oxycodone, and gabapentin. The following risks include CNS depression.   - allopurinol and ethacrynic acid. The following risks include ethacrynic acid increasing concentration of allopurinol.  - ethacrynic acid and oxycodone.  The following risks include oxycodone may enhanced the toxic effects of ethacrynic acid  Treatment team will monitor  for potential side effects and re-evaluate as needed.           Mental Status Exam:     Oriented to:  Grossly Oriented  General:  Awake and Alert  Appearance:  appears stated age and Grooming is adequate  Behavior/Attitude:  cooperative and open  Eye Contact:  appropriate  Psychomotor: normal and no evidence of tics, dystonia, or tardive dyskinesia no catatonia present  Speech:  appropriate volume/tone and talkative  Language: Fluent in English with appropriate syntax and vocabulary.  Mood:  \"teary inside\" \"better than yesterday\"  Affect:  congruent with mood and anxious  Thought Process:  coherent, goal directed, and circumstantial  Thought Content:  suicidal ideation (passive), No HI, No VH, and No AH; No apparent delusions  Associations:  intact  Insight:  fair due to recognizing the circumstances of her mental health   Judgment:  fair due to willingness to engage in ECT   Impulse control: fair  Attention Span:  grossly intact  Concentration:  grossly intact  Recent and Remote Memory:  not formally assessed  Fund of Knowledge:  average  Muscle Strength and Tone: normal  Gait and Station: Normal     Psychiatric Assessment     Randi Cleary is a 68 year old female previously diagnosed with MDD, recurrent severe, substance induced mood disorder, Unspecified Trauma, CHAVO, borderline personality disorder and alcohol use disorder who presented voluntarily with SI in the context of treatment resistant MDD. On " admission she presented with significant symptoms of depression incuding emotional lability, low mood, hopelessness, amotivation, anxiety, anhedonia, low energy, insomnia low appetite, excess guilt and poor concentration. Her affect was dysphroic and anxious at times with heavy perseveration on medico-surgical dignosises and passive SI causing incresed anxiety. She notes she has had a plan but no intent. Her history of extensive substance use, medical history and chronic pain contribute biologically. Her presentation is further complicated by borderline personality disorder and history of trauma. Additionally, she has a very limited support system, interpersonal conflicts with friends and her spouse. However her last hospitalization was in the 1980's for a suicide attempt via prozac overdose and has been engaged in treatment and present voluntarily. Her presentation is consistent with decompensated Major Depressive disorder, recurrent severe. Her disorder has been treatment resistant including to TMS and at this time, ECT would be the best course of action to address her symptom severity. Patient warrants inpatient psychiatric hospitalization to maintain her safety.     6/4 - She appears to be improving, though she still has reservations about being ready for discharge. She has stated that she feels ECT is helping, especially with her anger, so she seems to moving in the right direction. It is reassuring that she has been attending group sessions and is enjoying regular visits with her , who is likely who she will be living with once she gets discharged.   She also worried that she may have COVID due to an occasional feeling of warmth and slight diaphoresis. It seems unlikely that she would have COVID given her lack of other symptoms, but it should be easy to test for with a simple swab test.     Psychiatric Plan by Diagnosis      Today's changes:  -COVID negative  -CBC,CMP,TSH,AST/ALT,ALP, K WNL      # MDD,  recurrent, severe, with anxious distress  - Patient suffers from treatment resistant depression and has trialed many medications. At this point treatment team will need to do a chart review to review medication trials and determine the best medication to address depressive symptoms long term  - Pt will start ECT 5/24 : Hold high-dose gabapentin/pregabalin after 6pm on the day before ECT (to permit adequate seizure)   - Continue ECT  MWF       #Insomnia  - Zolpidem 5mg qpm prn    Pertinent Labs/Monitoring:   - EKG 5/22 - Qtc 458 NSR, RBBB     Additional Plans:  - Patient will be treated in therapeutic milieu with appropriate individual and group therapies as described     Psychiatric Hospital Course:      Randi Cleary was admitted to [facility] as a voluntary patient for electroconvulsive therapy for lifelong history of depression. Multiple medication trials were not effective. Symptoms notable for low mood, anhedonia, sleep difficulties, guilt and worthlessness, suicidal ideation with plan without intent.     The risks, benefits, alternatives, and side effects were discussed and understood by the patient     Medical Assessment and Plan     Medical diagnoses to be addressed this admission:    # Hypothyroidism  - PTA 150mcg M-Sa, 75mcg on Perez     # KYAW   - Doesn't use CPAP at home, sleep study on 06/2024      # RLS  - Continue PTA Ropinirole 2 mg PO TID  - Gabapentin 400mg qpm  - Magnesium Citrated changed to Magnesium Oxide 400mg BID (per patient and medicine)    #RBBB:  Previously on other EKGs    #Cervical radiculopathy and myelopathy s/p cervical laminoplasty 12/2021    # Chronic Pain  - Continue PTA Roxicodone 5 mg q6h + 5 mg PRN (for max daily dose of 25 mg)  - Menthol 2.5% topical gel q6hrs prn  - TENS Unit     #Dizziness  #Headache  Reports hx of migraines, no vision changes or hearing changes. notes this has been ongoing since she had her spinal surgery.   - CT head 5/22 unremarkable    #Gout  -  Nutrition consult  - Allopurinol 300mg qpm  - Flares in left podagra but none currently    #Vitamin B12 Deficiency  - Vit B12 500mcg qday    #Vitamin C Deficiency  - Ascorbic Acid 1000mg    #Vitamin D Deficiency  - 250mcg weekly    #COPD  - Breo Ellipta 1 puff daily   - Incruse Ellipta 1 puff daily  - Tessalon cap 200mg TID prn  - Albuterol 2 puffs q6hr prn  - Mucinex 1200mg BID prn     #GERD  #Hiatal Hernia  - Protonix 40mg daily    #Possible pulmonary HTN  #HTN  Per Medicine note, pt follows with Cardiology here and has scheduled right heart catheterization for 06/12/2024. Has follow up with Cardiology in clinic scheduled for 06/19/2024. PTA edecrin 12.5mg daily and potassium supplement. Electrolytes and renal function stable.Recently lost 50lbs and BP has improved.   - Ethacrynic Acid 12.5mg qday   -Hold Edecrin on date of ECT, continue 12.5mg daily for now  -Continue potassium supplement   - Klorcon 20meq daily     #Hemochromatosis, hereditary  - Hgb Stable at ~17    #Hepatic Steatosis  - Stable  - LFTs wnl, no abdominal pain, distention, N/V    #History of breast cancer s/p mastectomy, chemo and XRT:   - currently in remission      #Alcohol use disorder, in remission:   - No current use. Two years sober     #Hx of pheochromocytoma s/p left adrenalectomy 08/2017:   - Stable  - Follows Endocrinology for this.     #Psoriasis:  - Noted on R hang  - Uses scalp brush    Medical course: Patient was physically examined by the ED prior to being transferred to the unit and was found to be medically stable and appropriate for admission. Medicine consult was done to provide clearance for ECT. Due to patient's persistent neck pain after surgery in 2021, CT Head was done which was negative. Oxycodone changed from q6hr prn to scheduled with an extra 5mg in the day as needed per PTA regimen. Patient continued to express some nerve pain so gabapentin was increased to 400mg.     Medical course: Patient was physically examined by  the ED prior to being transferred to the unit and was found to be medically stable and appropriate for admission.     Consults: Dietician, Medicine for ECT Clearance, ECT Consult, Nutrition     Checklist     Legal Status: Voluntary     Safety Assessment:   Behavioral Orders   Procedures    Code 1 - Restrict to Unit    Code 2 - 1:1 Staff Supervision     For coming down to ECT only    Discontinue 1:1 attendant for suicide risk     Order Specific Question:   I have performed an in person assessment of the patient     Answer:   Based on this assessment the patient no longer requires a one on one attendant at this point in time.     Order Specific Question:   Rationale     Answer:   Patient States able to remain safe in hospital     Order Specific Question:   Rationale     Answer:   Modifications to care environment made to mitigate safety risk     Order Specific Question:   Rationale     Answer:   Routine observations are sufficient to monitor safety.    Electroconvulsive therapy     Series of up to 12 treatments. Begin Date: 5/24/24     Treating Psychiatrist providing ECT:  Ruthann     Notified on:  5/21/24    Electroconvulsive therapy     Series of up to 12 treatments. Begin Date: 5/24/24     Treating Psychiatrist providing ECT:  Ruthann     Notified on:  5/21/24    Fall precautions    Routine Programming     As clinically indicated    Status 15     Every 15 minutes.    Suicide precautions: Suicide Risk: MODERATE; Clinical rationale to override score: lack of access to a plan for self-harm     Order Specific Question:   Suicide Risk     Answer:   MODERATE     Order Specific Question:   Clinical rationale to override score:     Answer:   lack of access to a plan for self-harm       Risk Assessment:  Risk for harm is moderate-high.  Risk factors: SI, maladaptive coping, trauma, and past behaviors  Protective factors: engaged in treatment     SIO: Discontinued    Disposition: Pending stabilization, medication  optimization, & development of a safe discharge plan.     Attestations      Sung Myers, MS3     I was present with the medical student who contributed to the documentation as above. I agree with the assessment and plan documented in today's note. Some edits were made by me as necessary. Patient was examined for mental status by myself and was seen and discussed with attending Dr. Choudhary.      Jamila Jason MD  PGY-2 Psychiatry Resident       Attestation:  This patient has been seen and evaluated by me, Jairo Choudhary MD.  I have discussed this patient with the house staff team including the resident and medical student and I agree with the findings and plan in this note.    I have reviewed today's vital signs, medications, labs and imaging. Jairo Choudhary MD , PhD.

## 2024-06-04 NOTE — PROGRESS NOTES
Rehab Group    Start time: 1015  End time: 1215  Patient time total: 60 minutes    attended full group    #6 attended   Group Type: occupational therapy   Group Topic Covered: coping skills     Group Session Detail:  OT clinic     Patient Response/Contribution:  Cooperative with task, Attentive, and Actively engaged     Patient Detail:    Pt actively participated in occupational therapy clinic to facilitate coping skill exploration, creative expression within personally meaningful activities, and clinical observation of social, cognitive, and kinesthetic performance skills. Pt response: Requested to work on project at table outside of the group room with a peer who was sharing her task materials. Minimal assistance needed to initiate, gather materials, sequence, and adjust to workspace demands as needed. Demonstrated fair focus, planning, and attention to detail for selected creative expression task. Observed working at a slow, careful pace and in an organized manner. Able to ask for assistance as needed, and socialized with peers and staff. Calm and cooperative.         Train & Education Service Per Session 45 + Minutes () OT Group code    Patient Active Problem List   Diagnosis    DJD (degenerative joint disease), ankle and foot    Hereditary hemochromatosis (H24)    Pulmonary hypertension (H)    Postablative hypothyroidism    Hx of corticosteroid therapy    Class 2 obesity due to excess calories without serious comorbidity in adult    Prediabetes    KYAW (obstructive sleep apnea)    Type 2 diabetes mellitus without complication, without long-term current use of insulin (H)    Hypokalemia    COPD (chronic obstructive pulmonary disease) (H)    Generalized anxiety disorder    Restless leg syndrome    Vitamin B12 deficiency    Vitamin D deficiency    Morbid obesity (H)    Cord compression (H)    History of cervical spinal surgery    Cervical stenosis of spinal canal    Alcohol use disorder, moderate, in  sustained remission (H)    Essential hypertension    Psoriatic arthropathy (H)    Primary osteoarthritis involving multiple joints    Gastroesophageal reflux disease with esophagitis without hemorrhage    Numbness in both hands    Chronic, continuous use of opioids    Trauma and stressor-related disorder    Post-menopausal    Suicidal ideation    Depression with anxiety    Severe recurrent major depression without psychotic features (H)

## 2024-06-05 ENCOUNTER — ANESTHESIA EVENT (OUTPATIENT)
Dept: BEHAVIORAL HEALTH | Facility: CLINIC | Age: 69
DRG: 881 | End: 2024-06-05
Payer: MEDICARE

## 2024-06-05 ENCOUNTER — APPOINTMENT (OUTPATIENT)
Dept: BEHAVIORAL HEALTH | Facility: CLINIC | Age: 69
DRG: 881 | End: 2024-06-05
Payer: MEDICARE

## 2024-06-05 ENCOUNTER — ANESTHESIA (OUTPATIENT)
Dept: BEHAVIORAL HEALTH | Facility: CLINIC | Age: 69
DRG: 881 | End: 2024-06-05
Payer: MEDICARE

## 2024-06-05 PROCEDURE — 250N000013 HC RX MED GY IP 250 OP 250 PS 637

## 2024-06-05 PROCEDURE — 124N000002 HC R&B MH UMMC

## 2024-06-05 PROCEDURE — 90870 ELECTROCONVULSIVE THERAPY: CPT | Performed by: STUDENT IN AN ORGANIZED HEALTH CARE EDUCATION/TRAINING PROGRAM

## 2024-06-05 PROCEDURE — 90870 ELECTROCONVULSIVE THERAPY: CPT | Performed by: PSYCHIATRY & NEUROLOGY

## 2024-06-05 PROCEDURE — 250N000011 HC RX IP 250 OP 636: Performed by: PSYCHIATRY & NEUROLOGY

## 2024-06-05 PROCEDURE — 250N000009 HC RX 250: Performed by: STUDENT IN AN ORGANIZED HEALTH CARE EDUCATION/TRAINING PROGRAM

## 2024-06-05 PROCEDURE — 370N000017 HC ANESTHESIA TECHNICAL FEE, PER MIN: Performed by: STUDENT IN AN ORGANIZED HEALTH CARE EDUCATION/TRAINING PROGRAM

## 2024-06-05 PROCEDURE — 250N000013 HC RX MED GY IP 250 OP 250 PS 637: Performed by: PHYSICIAN ASSISTANT

## 2024-06-05 PROCEDURE — 90870 ELECTROCONVULSIVE THERAPY: CPT

## 2024-06-05 PROCEDURE — 250N000011 HC RX IP 250 OP 636: Performed by: STUDENT IN AN ORGANIZED HEALTH CARE EDUCATION/TRAINING PROGRAM

## 2024-06-05 RX ORDER — ESMOLOL HYDROCHLORIDE 10 MG/ML
INJECTION INTRAVENOUS PRN
Status: DISCONTINUED | OUTPATIENT
Start: 2024-06-05 | End: 2024-06-05

## 2024-06-05 RX ORDER — SODIUM CHLORIDE, SODIUM LACTATE, POTASSIUM CHLORIDE, CALCIUM CHLORIDE 600; 310; 30; 20 MG/100ML; MG/100ML; MG/100ML; MG/100ML
INJECTION, SOLUTION INTRAVENOUS CONTINUOUS
Status: CANCELLED | OUTPATIENT
Start: 2024-06-05

## 2024-06-05 RX ORDER — METHOHEXITAL IN WATER/PF 100MG/10ML
SYRINGE (ML) INTRAVENOUS PRN
Status: DISCONTINUED | OUTPATIENT
Start: 2024-06-05 | End: 2024-06-05

## 2024-06-05 RX ORDER — ONDANSETRON 2 MG/ML
4 INJECTION INTRAMUSCULAR; INTRAVENOUS EVERY 30 MIN PRN
Status: CANCELLED | OUTPATIENT
Start: 2024-06-05

## 2024-06-05 RX ORDER — METHOCARBAMOL 100 MG/ML
1000 INJECTION, SOLUTION INTRAMUSCULAR; INTRAVENOUS ONCE
Status: DISCONTINUED | OUTPATIENT
Start: 2024-06-05 | End: 2024-06-05

## 2024-06-05 RX ORDER — ONDANSETRON 4 MG/1
4 TABLET, ORALLY DISINTEGRATING ORAL EVERY 30 MIN PRN
Status: CANCELLED | OUTPATIENT
Start: 2024-06-05

## 2024-06-05 RX ORDER — LABETALOL HYDROCHLORIDE 5 MG/ML
INJECTION, SOLUTION INTRAVENOUS PRN
Status: DISCONTINUED | OUTPATIENT
Start: 2024-06-05 | End: 2024-06-05

## 2024-06-05 RX ORDER — NICARDIPINE HCL-0.9% SOD CHLOR 1 MG/10 ML
SYRINGE (ML) INTRAVENOUS PRN
Status: DISCONTINUED | OUTPATIENT
Start: 2024-06-05 | End: 2024-06-05

## 2024-06-05 RX ORDER — NALOXONE HYDROCHLORIDE 0.4 MG/ML
0.1 INJECTION, SOLUTION INTRAMUSCULAR; INTRAVENOUS; SUBCUTANEOUS
Status: CANCELLED | OUTPATIENT
Start: 2024-06-05

## 2024-06-05 RX ORDER — KETOROLAC TROMETHAMINE 30 MG/ML
30 INJECTION, SOLUTION INTRAMUSCULAR; INTRAVENOUS ONCE
Status: DISCONTINUED | OUTPATIENT
Start: 2024-06-05 | End: 2024-06-05

## 2024-06-05 RX ORDER — ACETAMINOPHEN 325 MG/1
975 TABLET ORAL ONCE
Status: CANCELLED | OUTPATIENT
Start: 2024-06-05 | End: 2024-06-05

## 2024-06-05 RX ORDER — DEXAMETHASONE SODIUM PHOSPHATE 4 MG/ML
4 INJECTION, SOLUTION INTRA-ARTICULAR; INTRALESIONAL; INTRAMUSCULAR; INTRAVENOUS; SOFT TISSUE
Status: CANCELLED | OUTPATIENT
Start: 2024-06-05

## 2024-06-05 RX ADMIN — GABAPENTIN 100 MG: 100 CAPSULE ORAL at 13:10

## 2024-06-05 RX ADMIN — Medication 500 MCG: at 09:32

## 2024-06-05 RX ADMIN — ROPINIROLE HYDROCHLORIDE 2 MG: 2 TABLET, FILM COATED ORAL at 14:03

## 2024-06-05 RX ADMIN — LABETALOL HYDROCHLORIDE 20 MG: 5 INJECTION, SOLUTION INTRAVENOUS at 08:28

## 2024-06-05 RX ADMIN — SUCCINYLCHOLINE CHLORIDE 90 MG: 20 INJECTION, SOLUTION INTRAMUSCULAR; INTRAVENOUS; PARENTERAL at 08:31

## 2024-06-05 RX ADMIN — ESMOLOL HYDROCHLORIDE 20 MG: 10 INJECTION, SOLUTION INTRAVENOUS at 08:34

## 2024-06-05 RX ADMIN — OXYCODONE HYDROCHLORIDE 5 MG: 5 TABLET ORAL at 06:25

## 2024-06-05 RX ADMIN — ACETAMINOPHEN 650 MG: 325 TABLET, FILM COATED ORAL at 10:00

## 2024-06-05 RX ADMIN — Medication 250 MCG: at 09:32

## 2024-06-05 RX ADMIN — OXYCODONE HYDROCHLORIDE 5 MG: 5 TABLET ORAL at 11:54

## 2024-06-05 RX ADMIN — GABAPENTIN 400 MG: 400 CAPSULE ORAL at 22:14

## 2024-06-05 RX ADMIN — GUAIFENESIN 600 MG: 600 TABLET ORAL at 20:02

## 2024-06-05 RX ADMIN — Medication 1000 MCG: at 08:28

## 2024-06-05 RX ADMIN — Medication 1000 MG: at 09:33

## 2024-06-05 RX ADMIN — GUAIFENESIN 600 MG: 600 TABLET ORAL at 09:32

## 2024-06-05 RX ADMIN — Medication 100 MG: at 08:30

## 2024-06-05 RX ADMIN — LEVOTHYROXINE SODIUM 150 MCG: 75 TABLET ORAL at 06:32

## 2024-06-05 RX ADMIN — POLYETHYLENE GLYCOL 3350 17 G: 17 POWDER, FOR SOLUTION ORAL at 10:00

## 2024-06-05 RX ADMIN — ALLOPURINOL 300 MG: 300 TABLET ORAL at 20:02

## 2024-06-05 RX ADMIN — FLUTICASONE FUROATE AND VILANTEROL TRIFENATATE 1 PUFF: 100; 25 POWDER RESPIRATORY (INHALATION) at 09:44

## 2024-06-05 RX ADMIN — KETOROLAC TROMETHAMINE 30 MG: 30 INJECTION, SOLUTION INTRAMUSCULAR; INTRAVENOUS at 08:35

## 2024-06-05 RX ADMIN — MAGNESIUM OXIDE TAB 400 MG (241.3 MG ELEMENTAL MG) 400 MG: 400 (241.3 MG) TAB at 09:32

## 2024-06-05 RX ADMIN — ROPINIROLE HYDROCHLORIDE 2 MG: 2 TABLET, FILM COATED ORAL at 20:02

## 2024-06-05 RX ADMIN — OXYCODONE HYDROCHLORIDE 5 MG: 5 TABLET ORAL at 22:10

## 2024-06-05 RX ADMIN — POTASSIUM CHLORIDE 20 MEQ: 1500 TABLET, EXTENDED RELEASE ORAL at 09:32

## 2024-06-05 RX ADMIN — PANTOPRAZOLE SODIUM 40 MG: 40 TABLET, DELAYED RELEASE ORAL at 09:32

## 2024-06-05 RX ADMIN — MAGNESIUM OXIDE TAB 400 MG (241.3 MG ELEMENTAL MG) 400 MG: 400 (241.3 MG) TAB at 20:02

## 2024-06-05 RX ADMIN — ESMOLOL HYDROCHLORIDE 20 MG: 10 INJECTION, SOLUTION INTRAVENOUS at 08:30

## 2024-06-05 RX ADMIN — ROPINIROLE HYDROCHLORIDE 2 MG: 2 TABLET, FILM COATED ORAL at 23:26

## 2024-06-05 RX ADMIN — Medication 3 MG: at 23:25

## 2024-06-05 RX ADMIN — Medication 12.5 MG: at 09:32

## 2024-06-05 RX ADMIN — UMECLIDINIUM 1 PUFF: 62.5 AEROSOL, POWDER ORAL at 09:44

## 2024-06-05 RX ADMIN — OXYCODONE HYDROCHLORIDE 5 MG: 5 TABLET ORAL at 18:02

## 2024-06-05 ASSESSMENT — ACTIVITIES OF DAILY LIVING (ADL)
ADLS_ACUITY_SCORE: 41
ORAL_HYGIENE: INDEPENDENT
ADLS_ACUITY_SCORE: 41
HYGIENE/GROOMING: INDEPENDENT
HYGIENE/GROOMING: INDEPENDENT;HANDWASHING
ADLS_ACUITY_SCORE: 41
DRESS: INDEPENDENT
ADLS_ACUITY_SCORE: 41
DRESS: INDEPENDENT
ADLS_ACUITY_SCORE: 41
ORAL_HYGIENE: INDEPENDENT
ADLS_ACUITY_SCORE: 41

## 2024-06-05 ASSESSMENT — COPD QUESTIONNAIRES: COPD: 1

## 2024-06-05 NOTE — PROGRESS NOTES
Patient adequate for discharge . Report called to unit nurse Leda VASQUEZ  . Iv removed and hemostasis achieved less than 2 min.  Patient safely transported back to unit with  staff persons.

## 2024-06-05 NOTE — PLAN OF CARE
"Pt denies SI.  This am prior to ECT pt affect was bright pt laughing and talking about where she lives.  Pt went down to to ECT and pt told the nurse that she was doing awful.  Pt returned to unit after ECT vitals WNL, denied headache, alert and oriented x3.  Pt did request and rec'd Tylenol for neck pain then at 12noon noon got scheduled Oxycodone for neck pain.  Pt reported high anxiety pt talked about a situation with another patient 2 days ago.  Winnie seemed to be holding onto this incident and also reported irritable with this other patient.  Pt states has not been attending gorups and been eating mesals in dining area related to this other patient.  Pt been social with select peers.  Pt has been eating meals.    Problem: Adult Inpatient Plan of Care  Goal: Plan of Care Review  Description: The Plan of Care Review/Shift note should be completed every shift.  The Outcome Evaluation is a brief statement about your assessment that the patient is improving, declining, or no change.  This information will be displayed automatically on your shift  note.  Outcome: Not Progressing  Goal: Patient-Specific Goal (Individualized)  Description: You can add care plan individualizations to a care plan. Examples of Individualization might be:  \"Parent requests to be called daily at 9am for status\", \"I have a hard time hearing out of my right ear\", or \"Do not touch me to wake me up as it startles  me\".  Outcome: Not Progressing  Goal: Absence of Hospital-Acquired Illness or Injury  Outcome: Not Progressing  Goal: Optimal Comfort and Wellbeing  Outcome: Not Progressing  Intervention: Monitor Pain and Promote Comfort  Recent Flowsheet Documentation  Taken 6/5/2024 1300 by Winsome Burns, RN  Pain Management Interventions: medication (see MAR)  Taken 6/5/2024 1154 by Winsome Burns, RN  Pain Management Interventions: medication (see MAR)  Taken 6/5/2024 1000 by Winsome Burns, RN  Pain Management Interventions: medication (see " MAR)  Goal: Readiness for Transition of Care  Outcome: Not Progressing   Goal Outcome Evaluation:

## 2024-06-05 NOTE — ANESTHESIA CARE TRANSFER NOTE
Patient: Randi Cleary    Procedure: * No procedures listed *       Diagnosis: * No pre-op diagnosis entered *  Diagnosis Additional Information: No value filed.    Anesthesia Type:   General     Note:    Oropharynx: oropharynx clear of all foreign objects and spontaneously breathing  Level of Consciousness: drowsy  Oxygen Supplementation: room air    Independent Airway: airway patency satisfactory and stable  Dentition: dentition unchanged  Vital Signs Stable: post-procedure vital signs reviewed and stable  Report to RN Given: handoff report given  Patient transferred to: PACU    Handoff Report: Identifed the Patient, Identified the Reponsible Provider, Reviewed the pertinent medical history, Discussed the surgical course, Reviewed Intra-OP anesthesia mangement and issues during anesthesia, Set expectations for post-procedure period and Allowed opportunity for questions and acknowledgement of understanding      Vitals:  Vitals Value Taken Time   /78 06/05/24 0848   Temp 36.6  C (97.9  F) 06/05/24 0848   Pulse 80 06/05/24 0848   Resp 16 06/05/24 0848   SpO2 92 % 06/05/24 0848       Electronically Signed By: Thea Ford MD  June 5, 2024  8:52 AM

## 2024-06-05 NOTE — ANESTHESIA POSTPROCEDURE EVALUATION
Patient: Randi Cleary    Procedure: * ECT       Anesthesia Type:  General    Note:     Postop Pain Control: Uneventful            Sign Out: Well controlled pain   PONV: No   Neuro/Psych: Uneventful            Sign Out: Acceptable/Baseline neuro status   Airway/Respiratory: Uneventful            Sign Out: Acceptable/Baseline resp. status   CV/Hemodynamics: Uneventful            Sign Out: Acceptable CV status; No obvious hypovolemia; No obvious fluid overload   Other NRE: NONE   DID A NON-ROUTINE EVENT OCCUR? No           Last vitals:  Vitals:    06/05/24 0644 06/05/24 0838 06/05/24 0848   BP: 130/72 136/74 126/78   Pulse: 88 66 80   Resp: 18 12 16   Temp: 36.8  C (98.2  F) 36.7  C (98  F) 36.6  C (97.9  F)   SpO2: 99% 93% 92%       Electronically Signed By: Thea Ford MD  June 5, 2024  8:52 AM

## 2024-06-05 NOTE — ANESTHESIA PREPROCEDURE EVALUATION
Anesthesia Pre-Procedure Evaluation    Patient: Randi Cleary   MRN: [id number] : 1955        Procedure : *ECT          Past Medical History:   Diagnosis Date    Bipolar 2 disorder (H)     Breast cancer (H)     lumpectomy, radiation, chemo    Chronic pain syndrome     COPD (chronic obstructive pulmonary disease) (H)     asthma    Cord compression (H) 2021    Dizzy     Drug tolerance     opioid    Esophageal reflux     Fatigue     Generalized anxiety disorder     Graves disease     Hemochromatosis 2018    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; H63D not detected    History of breast cancer 2020    Formatting of this note might be different from the original. Created by Conversion  Replacement Utility updated for latest IMO load Formatting of this note might be different from the original. Created by Conversion  Replacement Utility updated for latest IMO load    History of corticosteroid therapy 2019    History of partial adrenalectomy (H24) 2019    History of pheochromocytoma 2019    Hx antineoplastic chemotherapy     Hx of radiation therapy     Hyperlipidaemia     Hypertension     Impaired fasting glucose     Injury of neck, whiplash 07/15/2021    Joint pain     KYAW (obstructive sleep apnea) 2016    Osteopenia     Pheochromocytoma, left 2017    laparoscopically removed    Postablative hypothyroidism 1995    Prediabetes 10/03/2019    by A1c    Psoriasis     Psoriatic arthropathy (H)     Right rotator cuff tear     RLS (restless legs syndrome)     on ropinorole    Sacroiliitis (H24)     Serotonin syndrome 2020    [facility] - While on desvenlafaxine 100mg    Snoring     Spinal stenosis     Status post coronary angiogram 10/03/2019    Urinary incontinence     Vitamin B 12 deficiency 2009    Vitamin D deficiency 2010      Past Surgical History:   Procedure Laterality Date    ARTHRODESIS ANKLE      ARTHROPLASTY ANKLE Right 2015    Procedure: ARTHROPLASTY  ANKLE;  Surgeon: Jason Coughlin MD;  Location: [facility]    ARTHROPLASTY REVISION ANKLE Right 6/29/2015    Procedure: ARTHROPLASTY REVISION ANKLE;  Surgeon: Jason Coughlin MD;  Location: [facility]    BIOPSY BREAST      BREAST BIOPSY, CORE RT/LT      COLONOSCOPY      COLONOSCOPY N/A 2/25/2021    Procedure: COLONOSCOPY;  Surgeon: Guru Elke Tolbert MD;  Location:  GI    CV CORONARY ANGIOGRAM N/A 10/3/2019    Procedure: CV CORONARY ANGIOGRAM;  Surgeon: Bryce Pierre MD;  Location:  HEART CARDIAC CATH LAB    CV RIGHT HEART CATH MEASUREMENTS RECORDED N/A 10/3/2019    Procedure: CV RIGHT HEART CATH;  Surgeon: Bryce Pierre MD;  Location:  HEART CARDIAC CATH LAB    ESOPHAGOSCOPY, GASTROSCOPY, DUODENOSCOPY (EGD), COMBINED N/A 2/25/2021    Procedure: ESOPHAGOGASTRODUODENOSCOPY, WITH BIOPSY;  Surgeon: Guru Elke Tolbert MD;  Location:  GI    EYE SURGERY  2021    HC REMOVE TONSILS/ADENOIDS,<11 Y/O      Description: Tonsillectomy With Adenoidectomy;  Recorded: 04/07/2010;    IR LUMBAR EPIDURAL STEROID INJECTION  10/26/2004    IR LUMBAR EPIDURAL STEROID INJECTION  11/16/2004    IR LUMBAR EPIDURAL STEROID INJECTION  12/21/2004    IR LUMBAR EPIDURAL STEROID INJECTION  6/8/2006    JOINT REPLACEMENT      LAMINOPLASTY CERVICAL POSTERIOR THREE+ LEVELS Left 12/21/2021    Procedure: CERVICAL 3-CERVICAL 6 LEFT OPEN DOOR LAMINOPLASTY AND LEFT CERVICAL 4-5 AND CERVICAL 6-7 POSTERIOR FORAMINOTOMY;  Surgeon: Angela Gregory MD;  Location: [facility]    LAPAROSCOPIC ADRENALECTOMY Left 08/02/2017    pheochromocytoma    LAPAROSCOPIC ADRENALECTOMY Left 8/2/2017    Procedure: LAPAROSCOPIC LEFT ADRENALECTOMY, ;  Surgeon: Gab Linares MD;  [place] [facility] [place];  Service:     LENGTHEN TENDON ACHILLES Right 6/29/2015    Procedure: LENGTHEN TENDON ACHILLES;  Surgeon: Jason Coughlin MD;  Location: [facility]    LUMPECTOMY BREAST      LUMPECTOMY BREAST Left 1994  "   MAMMOPLASTY REDUCTION Right 2015    [place]    MAMMOPLASTY REDUCTION Right     approx late /    MASTECTOMY      left lumpectomy with axillary node dissection    MASTECTOMY MODIFIED RADICAL      OTHER SURGICAL HISTORY Right     reconstructive breast surgery    OTHER SURGICAL HISTORY      Adrenalectomy for pheochromocytoma    [place] MASTECTOMY, MODIFIED RADICAL      Description: Modified Radical Mastectomy Left Breast;  Recorded: 2010;    REPAIR HAMMER TOE Right 2015    Procedure: REPAIR HAMMER TOE;  Surgeon: Jason Coughlin MD;  Location: [facility]    TONSILLECTOMY      TONSILLECTOMY & ADENOIDECTOMY      ZC ARTHRODESIS,ANKLE,OPEN Right     Description: Ankle Arthrodesis;  Recorded: 2010;      Allergies   Allergen Reactions    Serotonin Reuptake Inhibitors (Ssris) Anxiety, Difficulty breathing, Headache, Palpitations and Shortness Of Breath    Buspirone      The patient states she had serotonin syndrome    Cephalexin      Other reaction(s): unknown rxn.    Desvenlafaxine      Serotonin syndrome    Diclofenac Sodium [Diclofenac]      Serotonin syndrome and restless legs syndrome    Gabapentin      Drove on the wrong side of the highway    Levofloxacin      \"CAN'T REMEMBER\"    Penicillins      \"SORES IN MOUTH\"    Riluzole Difficulty breathing and Swelling    Sulfa Antibiotics      \"PT DOES NOT KNOW WHAT THE REACTION WAS\"    Topiramate Other (See Comments)     Frequent urination      Social History     Tobacco Use    Smoking status: Former     Current packs/day: 0.00     Average packs/day: 2.5 packs/day for 29.2 years (72.9 ttl pk-yrs)     Types: Cigarettes     Start date: 1971     Quit date: 2000     Years since quittin.9     Passive exposure: Past    Smokeless tobacco: Never   Substance Use Topics    Alcohol use: Not Currently     Comment: relapse 2021 sober       Wt Readings from Last 1 Encounters:   24 88 kg (194 lb)        Anesthesia Evaluation   Pt has had " prior anesthetic.     No history of anesthetic complications       ROS/MED HX  ENT/Pulmonary:     (+) sleep apnea, uses CPAP,                        COPD,              Neurologic:       Cardiovascular: Comment: Pulmonary HTN.  Followed by Cardiology.  Next right heart cath scheduled for 6/19/2024.    (+)  hypertension- -   -  - -                                 Previous cardiac testing   Echo: Date: Results:    Stress Test:  Date: Results:    ECG Reviewed:  Date: 5/21/2024 Results:  RBBB, sinus rhythm  Cath:  Date: Results:      METS/Exercise Tolerance:     Hematologic:       Musculoskeletal: Comment: S/P Cervical Surgery - laminectomy   SIJ pain & joint pain  Osteopenia      GI/Hepatic: Comment: Hepatic steatosis    (+) GERD,                   Renal/Genitourinary:       Endo: Comment: Graves Disease. S/P Radioiodine Tx.    Hx of pheochromocytoma, S/P Left Adrenalectomy 8/2017    (+)          thyroid problem,     Obesity,       Psychiatric/Substance Use: Comment: MDD, recurrent, severe, with anxious distress      Infectious Disease:       Malignancy:   (+) Malignancy, History of Breast.Breast CA Remission status post Surgery and Chemo.      Other:            Physical Exam    Airway  airway exam normal      Mallampati: II   TM distance: > 3 FB   Neck ROM: full   Mouth opening: > 3 cm    Respiratory Devices and Support         Dental       (+) Minor Abnormalities - some fillings, tiny chips      Cardiovascular   cardiovascular exam normal          Pulmonary   pulmonary exam normal            Other findings: S/P Cervical Surgery, good ROM  OUTSIDE LABS:  CBC:   Lab Results   Component Value Date    WBC 7.9 06/04/2024    WBC 7.1 05/22/2024    HGB 17.0 (H) 06/04/2024    HGB 17.7 (H) 05/22/2024    HCT 48.1 (H) 06/04/2024    HCT 49.9 (H) 05/22/2024     06/04/2024     05/22/2024     BMP:   Lab Results   Component Value Date     06/04/2024     05/22/2024    POTASSIUM 4.3 06/04/2024    POTASSIUM 5.2  06/04/2024    CHLORIDE 103 06/04/2024    CHLORIDE 104 05/22/2024    CO2 21 (L) 06/04/2024    CO2 24 05/22/2024    BUN 17.3 06/04/2024    BUN 9.3 05/22/2024    CR 0.53 06/04/2024    CR 0.57 05/22/2024    GLC 96 06/04/2024     (H) 05/22/2024     COAGS:   Lab Results   Component Value Date    PTT 34 12/13/2021    INR 0.94 12/13/2021     POC:   Lab Results   Component Value Date     (H) 02/25/2021     HEPATIC:   Lab Results   Component Value Date    ALBUMIN 3.7 06/04/2024    PROTTOTAL 7.2 06/04/2024    ALT 33 06/04/2024    AST 35 06/04/2024    ALKPHOS 80 06/04/2024    BILITOTAL 0.4 06/04/2024     OTHER:   Lab Results   Component Value Date    A1C 5.6 05/22/2024    LINDA 9.5 06/04/2024    MAG 1.9 05/22/2024    TSH 1.69 06/04/2024    T4 1.49 03/29/2024    T3 114 01/18/2023    CRP <2.9 11/16/2021    SED 8 10/17/2023       Anesthesia Plan    ASA Status:  3    NPO Status:  NPO Appropriate    Anesthesia Type: General.     - Airway: Mask Only   Induction: Intravenous.   Maintenance: N/A.        Consents    Anesthesia Plan(s) and associated risks, benefits, and realistic alternatives discussed. Questions answered and patient/representative(s) expressed understanding.     - Discussed: Risks, Benefits and Alternatives for BOTH SEDATION and the PROCEDURE were discussed     - Discussed with:  Patient      - Extended Intubation/Ventilatory Support Discussed: No.      - Patient is DNR/DNI Status: No     Use of blood products discussed: No .     Postoperative Care            Comments:    Other Comments: ECT           Thea Ford MD    I have reviewed the pertinent notes and labs in the chart from the past 30 days and (re)examined the patient.  Any updates or changes from those notes are reflected in this note.

## 2024-06-05 NOTE — PROCEDURES
"Procedures  [facility], [place]   ECT Procedure Note   06/05/2024    Randi Claery is a 68 year old female patient.  [id number]    Patient Status: IN-patient    Is this the first in a series of 12 treatments?  No      Allergies   Allergen Reactions    Serotonin Reuptake Inhibitors (Ssris) Anxiety, Difficulty breathing, Headache, Palpitations and Shortness Of Breath    Buspirone      The patient states she had serotonin syndrome    Cephalexin      Other reaction(s): unknown rxn.    Desvenlafaxine      Serotonin syndrome    Diclofenac Sodium [Diclofenac]      Serotonin syndrome and restless legs syndrome    Gabapentin      Drove on the wrong side of the highway    Levofloxacin      \"CAN'T REMEMBER\"    Penicillins      \"SORES IN MOUTH\"    Riluzole Difficulty breathing and Swelling    Sulfa Antibiotics      \"PT DOES NOT KNOW WHAT THE REACTION WAS\"    Topiramate Other (See Comments)     Frequent urination       Weight:  194 lbs 0 oz / 87 kg          Indications for ECT:   Medications ineffective and Psychotherapies ineffective         Clinical Narrative:   HPI - The patient describes a lifelong history of depression dating back to elementary school, worsening after her father's death when she was 13yo and especially in the 1980s in the setting of worsening physical health (since falling and breaking her ankle), which led to the the initiation of fluoxetine, her first antidepressant.  Her history has been primarily characterized by low mood, with some ups and downs but no extended depression-free periods since then.  She has tried many different medications from different classes, but cannot recall any one being particularly helpful for her.  She has experienced past episodes of particularly irritable mood and concomitant decreased sleep need, although most of these have lasted 1-2 days and triggered by anger related to external stressors.  She has at least one episode of a more extended " "episode of elevated mood (and associated grandiosity, increased spending, flight of ideas, increased goal directed behaviors, pressured speech, and odd and embarrassing behavior), which occurred in the context of relapse on alcohol in 2021. She does not endorse any events before about age 48.     The patient's depression is characterized by near daily low mood, frequent anhedonia, with sleep difficulties (although c/b pain, KYAW, and RLS), fatigue, feelings of guilt/worthlessness, and impaired concentration.  She reports feelings of \"despair,\" at times with active SI with plan, but denies any intent to act on this - \"maybe it's hope.\"  She has 1 lifetime suicide attempt (overdose) in the 1980s, for which she was psychiatrically hospitalized.  She has a remote history of SIB (cutting) but not recently.       Psych pertinent item history includes includes suicide attempt , suicidal ideation, SIB , aggression, trauma hx, substance use: alcohol, cannabis, and Patient has a history of alcohol dependence treated 20+ years ago and she relapsed in 2020 for a couple of months, in her 20s she\" loved getting high\" on cocaine, LSD, mushrooms, speed, white cross, mutiple psychotropic trials , psych hosp, ketamine, and major medical problems.    Target Symptoms for Improvement: Amotivation, Fatigue, Improved self-image and Panic attacks         Diagnosis:   Major depression         Assessment:   #1 05/24/24 Some circumstantial thought, needs some redirection, 3.5 hours sleep last night, anxious, mood 1/10, PDW but no SI, never had ECT before - only TMS. No AVH.   #2 05/29/24  Mood 4/10, better over w/e, some word find difficulties, no AVH/SI/PDW. Chronic sciatica pain, neck and shoulder pain - didn't worsen with ECT. Mild headache.   #3 05/31/24  Mood 4/10, No SI, PDW, AVH, some wrist pain and headache, memory might be improving.    #4 6/3/24  Phq-9= 13, mood 3/10.  Yesterday was very tearful, still a bit today.  Last treatment " "had awareness under paralysis.  Otherwise, some initial confusion after first ECT but no subsequent cognitive effects.  Signed consent to continue.  #5 6/5/24 Mood 4-5/10  She feels like her \"rage\" is less and she feels lighter. but no cognitive side effects. She complains of excessive sweating and is concerned her thryoid is unbalanced. She is somatically focused She reports pain on the side of her neck radiating down to her chest. She had a migraine she thinks over the weekend which usual starts as occipital tension       Pause for the Cause:     Correct patient Yes   Correct procedure/laterality settings: Yes           Intra-Procedure Documentation:     ECT #: 5   Treatment number this series: 5   Total treatment number: 5     Type of ECT:  Right, unilateral ultrabrief    ECT Medications:    Toradol 30mg iv - for headache/myalgia     Brevital: 100 mg (incr from 90 mg as still awake)   Succinyl Choline: 90 mg    BP - nicardipine 1500 mcg         ECT Strip Summary: RUL Titration #3: 38.4 mC   Energy Level:  230.4 mC, 0.3 ms, 60 Hz, 8 sec, 800 mA     Motor Seizure Duration: 19 seconds  EEG Seizure Duration: 37seconds    Complications: none  Plan:   - Continue RUL ECT - Q MWF    - Monitor depression severity with clinical assessment augmented with PHQ9 every other treatment  - Continue current medications    Discharge instructions:   - Continue acute ECT    - Per Dr Varela:   - Consider trial of serotonin modulator (e.g., vilazodone vs. vortioxetine) or novel agent Auvelity  - Consider augmentation with atypical antipsychotic (e.g., quetiapine or aripiprazole), though there are concerns given pre-diabetes      Toan Cotter MD  Dept of Psychiatry  "

## 2024-06-05 NOTE — PROVIDER NOTIFICATION
06/05/24 1059   Individualization/Patient Specific Goals   Patient Personal Strengths appropriate judgment/decision-making;expressive of emotions;expressive of needs;independent living skills;intellectual cognitive skills;interests/hobbies;motivated for recovery;medication/treatment adherence;positive vocational history;resilient;resourceful;socioeconomic stability;stable living environment   Patient Vulnerabilities family/relationship conflict;history of unsuccessful treatment;poor impulse control;traumatic event   Anxieties, Fears or Concerns Many somatic concerns   Individualized Care Needs Chronic pain   Interprofessional Rounds   Summary 1. Stabilization of mood disorder symptoms 2. Safe with self 3. Medication mgmt per MD's 4. Medically cleared for ECT 5.Coordination of care with outpatient provders 6. Psych f/u care in place   Participants CTC;patient;psychiatrist;nursing   Behavioral Team Discussion   Participants Dr. Choudhary, Winsome Solano RN, Lorena Victoria MA,LP, Med students   Progress Patient has undergone ECT x5 thus far. She continues to have many somatic complaints however mood is starting to improve. Patient has been social /engaging on the unit.   Anticipated length of stay 5-7 days   Continued Stay Criteria/Rationale Severity of depression -  initiation of ECT.   has been working and unable to transport for outpatient ECT- possibly able to next week   Medical/Physical Chronic pain, S/P Breast cancer   Precautions Per unit protocol   Plan She will be seen by Psychiatry daily.  Meds will be reviewed/adjusted per MD's. ECT in progress.  Care will be coordinated with outpatient providers. Patient will return home when stable/safe.  CTC will continue to assess needs- ensure appropriate f/u care is in place   Rationale for change in precautions or plan No change in plan/precautions   Safety Plan Patient to complete   Anticipated Discharge Disposition home with family     Goal Outcome  Evaluation:

## 2024-06-05 NOTE — PROGRESS NOTES
Pt returned to st [facility] via ECT.  Pt vitals WNL. Pt Alert and oriented x3.  Pt denies nausea and headache.  Pt ate breakfast. Pt requested and rec'd PRN Tylenol for neck pain. Pt rated pain 7/10.

## 2024-06-05 NOTE — PLAN OF CARE
"Assessment/Intervention/Current Symtoms and Care Coordination:  Chart reviewed and patient met with team,   Discussed patient progress, symptomology, and response to treatment.  Discussed the discharge plan and any potential impediments to discharge.     Patient continues course of ECT without complication. Patient completed #5 today.  States she is feeling better and is starting to look forward to going home. Patient has continues to actively participate in groups, has been social with peers.    Patient has an outpatient Psych appt 6/7.  Writer sent provider a msg requesting this be rescheduled.  Patient is interested in returning to [facility] program if they will allow her to return.  A referral order has been placed.  Patient also interested in a new \"in person \" therapist as her current therapist is virtual only. Referral will therefore be made.        Discharge Plan or Goal:  Patient will return home when stable  Psychiatry  Therapy  [facility]? Writer will contact program to inquire isf she can return once ECT is completed.     Barriers to Discharge:  Severity of depression/inability to function  Initiation of ECT- unable to do ECT as outpatient as  works- can't provide transportation/care.     Referral Status:  [facility] - referral placed today 6/4  Therapist- referral in process    Legal Status:  Voluntary     Contacts:  Outpatient Psychiatrist: Jeannette Adams  Psychiatry  Therapy: Arsenio Wylie  Psychiatry- SLP  Primary Physician: Laith Constantino  Family Members: Justin Cleary (Spouse) [phone]     Upcoming Meetings and Dates/Important Information and next steps:  Team update:  Wed  "

## 2024-06-05 NOTE — PLAN OF CARE
BEH IP Unit Acuity Rating Score (UARS)  Patient is given one point for every criteria they meet.     CRITERIA SCORING   On a 72 hour hold, court hold, committed, stay of commitment, or revocation. 0    Patient LOS on BEH unit exceeds 20 days. 0  LOS: 15   Patient under guardianship, 55+, otherwise medically complex, or under age 11. 1   Suicide ideation without relief of precipitating factors. 1   Current plan for suicide. 0   Current plan for homicide. 0   Imminent risk or actual attempt to seriously harm another without relief of factors precipitating the attempt. 0   Severe dysfunction in daily living (ex: complete neglect for self care, extreme disruption in vegetative function, extreme deterioration in social interactions). 1   Recent (last 7 days) or current physical aggression in the ED or on unit. 0   Restraints or seclusion episode in past 72 hours. 0   Recent (last 7 days) or current verbal aggression, agitation, yelling, etc., while in the ED or unit. 0   Active psychosis. 0   Need for constant or near constant redirection (from leaving, from others, etc).  0   Intrusive or disruptive behaviors. 0   Patient requires 3 or more hours of individualized nursing care per 8-hour shift (i.e. for ADLs, meds, therapeutic interventions). 0   TOTAL 3

## 2024-06-05 NOTE — PLAN OF CARE
Pt to have ECT this morning  ; NPO after midnight. No PRNs given this shift. Pain controlled with  scheduled pain meds. Safety checks completed every 15 minutes; no safety concerns noted. Pt appears to have slept for  6  hours; will continue to monitor and offer support    Problem: Sleep Disturbance  Goal: Adequate Sleep/Rest  Outcome: Progressing   Goal Outcome Evaluation:

## 2024-06-06 ENCOUNTER — TELEPHONE (OUTPATIENT)
Dept: BEHAVIORAL HEALTH | Facility: CLINIC | Age: 69
End: 2024-06-06
Payer: MEDICARE

## 2024-06-06 PROCEDURE — 250N000013 HC RX MED GY IP 250 OP 250 PS 637

## 2024-06-06 PROCEDURE — 99232 SBSQ HOSP IP/OBS MODERATE 35: CPT | Mod: GC | Performed by: PSYCHIATRY & NEUROLOGY

## 2024-06-06 PROCEDURE — G0177 OPPS/PHP; TRAIN & EDUC SERV: HCPCS

## 2024-06-06 PROCEDURE — 124N000002 HC R&B MH UMMC

## 2024-06-06 PROCEDURE — 250N000013 HC RX MED GY IP 250 OP 250 PS 637: Performed by: PHYSICIAN ASSISTANT

## 2024-06-06 PROCEDURE — 90791 PSYCH DIAGNOSTIC EVALUATION: CPT | Performed by: COUNSELOR

## 2024-06-06 RX ADMIN — FLUTICASONE FUROATE AND VILANTEROL TRIFENATATE 1 PUFF: 100; 25 POWDER RESPIRATORY (INHALATION) at 07:52

## 2024-06-06 RX ADMIN — ROPINIROLE HYDROCHLORIDE 2 MG: 2 TABLET, FILM COATED ORAL at 23:18

## 2024-06-06 RX ADMIN — Medication 12.5 MG: at 07:53

## 2024-06-06 RX ADMIN — MAGNESIUM OXIDE TAB 400 MG (241.3 MG ELEMENTAL MG) 400 MG: 400 (241.3 MG) TAB at 07:53

## 2024-06-06 RX ADMIN — OXYCODONE HYDROCHLORIDE 5 MG: 5 TABLET ORAL at 12:26

## 2024-06-06 RX ADMIN — MAGNESIUM OXIDE TAB 400 MG (241.3 MG ELEMENTAL MG) 400 MG: 400 (241.3 MG) TAB at 20:27

## 2024-06-06 RX ADMIN — OXYCODONE HYDROCHLORIDE 5 MG: 5 TABLET ORAL at 18:58

## 2024-06-06 RX ADMIN — PANTOPRAZOLE SODIUM 40 MG: 40 TABLET, DELAYED RELEASE ORAL at 07:53

## 2024-06-06 RX ADMIN — GUAIFENESIN 600 MG: 600 TABLET ORAL at 07:53

## 2024-06-06 RX ADMIN — ROPINIROLE HYDROCHLORIDE 2 MG: 2 TABLET, FILM COATED ORAL at 20:26

## 2024-06-06 RX ADMIN — Medication 500 MCG: at 07:53

## 2024-06-06 RX ADMIN — OXYCODONE HYDROCHLORIDE 5 MG: 5 TABLET ORAL at 00:27

## 2024-06-06 RX ADMIN — OXYCODONE HYDROCHLORIDE 5 MG: 5 TABLET ORAL at 06:05

## 2024-06-06 RX ADMIN — POTASSIUM CHLORIDE 20 MEQ: 1500 TABLET, EXTENDED RELEASE ORAL at 07:53

## 2024-06-06 RX ADMIN — GUAIFENESIN 600 MG: 600 TABLET ORAL at 20:26

## 2024-06-06 RX ADMIN — LEVOTHYROXINE SODIUM 150 MCG: 75 TABLET ORAL at 06:44

## 2024-06-06 RX ADMIN — GABAPENTIN 100 MG: 100 CAPSULE ORAL at 14:42

## 2024-06-06 RX ADMIN — Medication 3 MG: at 23:19

## 2024-06-06 RX ADMIN — UMECLIDINIUM 1 PUFF: 62.5 AEROSOL, POWDER ORAL at 07:52

## 2024-06-06 RX ADMIN — ACETAMINOPHEN 650 MG: 325 TABLET, FILM COATED ORAL at 14:43

## 2024-06-06 RX ADMIN — Medication 1000 MG: at 07:53

## 2024-06-06 RX ADMIN — ALLOPURINOL 300 MG: 300 TABLET ORAL at 20:27

## 2024-06-06 RX ADMIN — OXYCODONE HYDROCHLORIDE 5 MG: 5 TABLET ORAL at 23:19

## 2024-06-06 RX ADMIN — ROPINIROLE HYDROCHLORIDE 2 MG: 2 TABLET, FILM COATED ORAL at 14:43

## 2024-06-06 ASSESSMENT — COLUMBIA-SUICIDE SEVERITY RATING SCALE - C-SSRS
4. HAVE YOU HAD THESE THOUGHTS AND HAD SOME INTENTION OF ACTING ON THEM?: NO
2. HAVE YOU ACTUALLY HAD ANY THOUGHTS OF KILLING YOURSELF IN THE PAST MONTH?: YES
BASED ON RESPONSES TO C-SSRS QS 1-6, WHAT IS THE PATIENT'S OVERALL RISK RATING FOR SUICIDE: HIGH RISK
6. HAVE YOU EVER DONE ANYTHING, STARTED TO DO ANYTHING, OR PREPARED TO DO ANYTHING TO END YOUR LIFE?: YES
1. IN THE PAST MONTH, HAVE YOU WISHED YOU WERE DEAD OR WISHED YOU COULD GO TO SLEEP AND NOT WAKE UP?: YES
3. HAVE YOU BEEN THINKING ABOUT HOW YOU MIGHT KILL YOURSELF?: YES
5. HAVE YOU STARTED TO WORK OUT OR WORKED OUT THE DETAILS OF HOW TO KILL YOURSELF? DO YOU INTEND TO CARRY OUT THIS PLAN?: YES

## 2024-06-06 ASSESSMENT — ACTIVITIES OF DAILY LIVING (ADL)
HYGIENE/GROOMING: INDEPENDENT
ADLS_ACUITY_SCORE: 41
ORAL_HYGIENE: INDEPENDENT
ADLS_ACUITY_SCORE: 41
DRESS: INDEPENDENT
ADLS_ACUITY_SCORE: 41
HYGIENE/GROOMING: HANDWASHING;INDEPENDENT
ADLS_ACUITY_SCORE: 41
ORAL_HYGIENE: INDEPENDENT
ADLS_ACUITY_SCORE: 41
DRESS: STREET CLOTHES;INDEPENDENT
ADLS_ACUITY_SCORE: 41

## 2024-06-06 ASSESSMENT — ANXIETY QUESTIONNAIRES
IF YOU CHECKED OFF ANY PROBLEMS ON THIS QUESTIONNAIRE, HOW DIFFICULT HAVE THESE PROBLEMS MADE IT FOR YOU TO DO YOUR WORK, TAKE CARE OF THINGS AT HOME, OR GET ALONG WITH OTHER PEOPLE: VERY DIFFICULT
4. TROUBLE RELAXING: MORE THAN HALF THE DAYS
5. BEING SO RESTLESS THAT IT IS HARD TO SIT STILL: SEVERAL DAYS
7. FEELING AFRAID AS IF SOMETHING AWFUL MIGHT HAPPEN: NOT AT ALL
2. NOT BEING ABLE TO STOP OR CONTROL WORRYING: SEVERAL DAYS
1. FEELING NERVOUS, ANXIOUS, OR ON EDGE: MORE THAN HALF THE DAYS
3. WORRYING TOO MUCH ABOUT DIFFERENT THINGS: SEVERAL DAYS
GAD7 TOTAL SCORE: 9
6. BECOMING EASILY ANNOYED OR IRRITABLE: MORE THAN HALF THE DAYS
GAD7 TOTAL SCORE: 9

## 2024-06-06 ASSESSMENT — PATIENT HEALTH QUESTIONNAIRE - PHQ9: SUM OF ALL RESPONSES TO PHQ QUESTIONS 1-9: 16

## 2024-06-06 NOTE — PLAN OF CARE
Problem: Suicide Risk  Goal: Absence of Self-Harm  Outcome: Progressing     Problem: Depression  Goal: Improved Mood  Outcome: Progressing     Problem: Fall Injury Risk  Goal: Absence of Fall and Fall-Related Injury  Outcome: Progressing   Goal Outcome Evaluation:    Plan of Care Reviewed With: patient         Alert  and able to communicate needs. Mel was cooperative and medication compliant. She was visible in milieu Interactive with peers and staff members, spent time in her room and watching tv. Reported anxiety has been gaby today rating it at 8, depression rated at 7. Patient denied suicidal/homicidal ideation. ADLs WNL, back, neck and bilateral shoulders pain managed with scheduled and PRN Oxycodone.  Food and fluid intake WNL.No ECT Side effects reported.
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
## 2024-06-06 NOTE — PLAN OF CARE
BEH IP Unit Acuity Rating Score (UARS)  Patient is given one point for every criteria they meet.     CRITERIA SCORING   On a 72 hour hold, court hold, committed, stay of commitment, or revocation. 0    Patient LOS on BEH unit exceeds 20 days. 0  LOS: 16   Patient under guardianship, 55+, otherwise medically complex, or under age 11. 1   Suicide ideation without relief of precipitating factors. 0   Current plan for suicide. 0   Current plan for homicide. 0   Imminent risk or actual attempt to seriously harm another without relief of factors precipitating the attempt. 0   Severe dysfunction in daily living (ex: complete neglect for self care, extreme disruption in vegetative function, extreme deterioration in social interactions). 1   Recent (last 7 days) or current physical aggression in the ED or on unit. 0   Restraints or seclusion episode in past 72 hours. 0   Recent (last 7 days) or current verbal aggression, agitation, yelling, etc., while in the ED or unit. 0   Active psychosis. 0   Need for constant or near constant redirection (from leaving, from others, etc).  0   Intrusive or disruptive behaviors. 0   Patient requires 3 or more hours of individualized nursing care per 8-hour shift (i.e. for ADLs, meds, therapeutic interventions). 0   TOTAL 2

## 2024-06-06 NOTE — PLAN OF CARE
Assessment/Intervention/Current Symtoms and Care Coordination:  Chart reviewed and patient met with team,   Discussed patient progress, symptomology, and response to treatment.  Discussed the discharge plan and any potential impediments to discharge.     Patient continues course of ECT without complication.   States she continues to be feeling better thank when admitted. Patient has continued to actively participate in groups, has been social with peers.     Patient completed 55 Plus DA this AM.  Will coordinate start date when discharge is known.    Writer was able to reschedule Psychiatry for patient- noted in AVS        Discharge Plan or Goal:  Patient will return home when stable  Psychiatry  Therapy  55 Plus     Barriers to Discharge:  Severity of depression/inability to function  Initiation of ECT- unable to do ECT as outpatient as  works- can't provide transportation/care.     Referral Status:  55 PLus IOP -intake completed 6/6  Therapist- referral in process     Legal Status:  Voluntary     Contacts:  Outpatient Psychiatrist: Jeannette BOSCH [facility]  Therapy: Arsenio MULTANI [facility]- SLP  Primary Physician: Laith Constantino  Family Members: Justin Cleary (Spouse) [phone]     Upcoming Meetings and Dates/Important Information and next steps:  Team update:  Wed

## 2024-06-06 NOTE — TELEPHONE ENCOUNTER
Summary of Patient Care Communication Handoff to Patient Navigator Coordinator    PATIENT'S NAME: Randi Cleary  MRN:   [id number]  :   1955    DATE OF SERVICE: 24    Referral Needed: Yes    Is the patient coming from an inpatient unit? Yes   What station is the patient on? 20NB    Unit Phone Number [phone]    Name of CTC to Contact with Follow up: Lorena MEDEIROS    Is this referral a high priority (high priority is patient discharging within the next 24 hours and needs placement) no    What program is this referral for? Adult Mental Health Referral    Level of Care Recommended:  Combined Intensive Outpatient Day Treatment Program (IOP-ADT) / Adult Day Treatment Program (ADT)    Specialty Track Recommendations:  MH Specialty Tracks: 55 Plus    Schedule Preferences: Schedule Preference: Afternoons Preferred    Are there any potential barriers for entrance into programmatic care? NA    Followed up from  Needed?:  No    Mental Health Referral Needed: No    Release of Information Needed:  SONIYA    Faxing Needed: No    Follow up Requests:  Patient Navigator Coordinator Follow-Up Needed: Referral to designated [facility].    Comments: SONIYA Garcia, [facility], [facility]        Patient Navigator Coordinator Contact Information  Pool Message: [email or web address] ([id number])   Phone:  [phone]  Fax:  [phone]  Email:  [email or web address]
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
## 2024-06-06 NOTE — PLAN OF CARE
Problem: Depression  Goal: Improved Mood  Outcome: Progressing     Problem: Suicide Risk  Goal: Absence of Self-Harm  Outcome: Progressing     Problem: Adult Behavioral Health Plan of Care  Goal: Absence of New-Onset Illness or Injury  Intervention: Identify and Manage Fall Risk  Recent Flowsheet Documentation  Taken 6/6/2024 1736 by Yahir Zamora, RN  Safety Measures:   environmental rounds completed   safety rounds completed   Goal Outcome Evaluation:    Plan of Care Reviewed With: patient        Alert and oriented, able to communicate needs and express feelings. Patient was visible in milieu, she was social and  interactive with peers and staff members.   Pleasant, cooperative, and medication compliant. Endorsed mild anxiety, denied depression, denied suicidal ideation, contracted for safety. Happy that she was accepted in 55[facility] program. ADLs WNL, 7/10 neck, back and bilateral shoulders pain. Pain managed with scheduled Oxycodone. Food and fluid intake WNL.

## 2024-06-06 NOTE — PLAN OF CARE
Rehab Group    Start time: 1015  End time: 1200  Patient time total: 90 minutes    attended full group    #6 attended   Group Type: OT Clinic   Group Topic Covered: coping skills     Group Session Detail:    Intervention: Pt participated in a OT Clinic group to facilitate coping skills exploration and creative expression through personally meaningful activities, and to encourage utilization of these healthy coping skills to promote overall health and wellness. Group included clinical observation of social, cognitive and kinesthetic performance skills to inform treatment and safe discharge planning.    Mood/Affect:  Pleasant       Plan: Patient encouraged to maintain attendance for continued ongoing support in working towards occupational therapy goals to support overall treatment/care.        Patient Detail:    At start of group appeared overwhelmed at first with needing to choose a project to work on. Was able to settle on a project and worked on this for the duration of time in group. Was social off and on during this time with both writer and other peers in the group.      Patient Active Problem List   Diagnosis    DJD (degenerative joint disease), ankle and foot    Hereditary hemochromatosis (H24)    Pulmonary hypertension (H)    Postablative hypothyroidism    Hx of corticosteroid therapy    Class 2 obesity due to excess calories without serious comorbidity in adult    Prediabetes    KYAW (obstructive sleep apnea)    Type 2 diabetes mellitus without complication, without long-term current use of insulin (H)    Hypokalemia    COPD (chronic obstructive pulmonary disease) (H)    Generalized anxiety disorder    Restless leg syndrome    Vitamin B12 deficiency    Vitamin D deficiency    Morbid obesity (H)    Cord compression (H)    History of cervical spinal surgery    Cervical stenosis of spinal canal    Alcohol use disorder, moderate, in sustained remission (H)    Essential hypertension    Psoriatic arthropathy (H)     Primary osteoarthritis involving multiple joints    Gastroesophageal reflux disease with esophagitis without hemorrhage    Numbness in both hands    Chronic, continuous use of opioids    Trauma and stressor-related disorder    Post-menopausal    Suicidal ideation    Depression with anxiety    Severe recurrent major depression without psychotic features (H)

## 2024-06-06 NOTE — PLAN OF CARE
No PRNs given this shift. Pain controlled with  scheduled pain meds. Safety checks completed every 15 minutes; no safety concerns noted. Pt appears to have slept for  6.75 hours; will continue to monitor and offer support    Problem: Sleep Disturbance  Goal: Adequate Sleep/Rest  Outcome: Progressing   Goal Outcome Evaluation:

## 2024-06-06 NOTE — PROGRESS NOTES
"Outpatient Mental Health Services - Adult    MY COPING PLAN FOR SAFETY    PATIENT'S NAME: Randi Cleary  MRN:   [id number]    SAFETY PLAN:    Step 1: Warning signs / cues (Thoughts, images, mood, situation, behavior) that a crisis may be developing:  Thoughts: \"I don't want to continue\" \"I am unwanted\"  Images: none  Thinking Processes: ruminating  Mood: anger  Behaviors: isolating/withdrawing, can't stop crying, not taking care of myself and not taking care of my responsibilities  Situations: small triggers, such as not being able to find something, or dropping something   Step 2: Coping strategies - Things I can do to take my mind off of my problems without contacting another person (relaxation technique, physical activity):  Distress Tolerance Strategies:  arts and crafts: drawing, play with my pet, listen to positive and upbeat music: any, change body temperature (ice pack/cold water)  and paced breathing/progressive muscle relaxation  Physical Activities: go for a walk, deep breathing and stretching   Focus on helpful thoughts:  \"You've been through this before, you can get through it again.\"  Step 3: People and social settings that provide distraction:                 Name: Carmen                            Name: Darien                           Name: Aleida       pool, shopping, Carmen's house, [employer]       Step 4: Remind myself of people and things that are important to me and worth living for:  Clifford Little Donna, post-COVID world, options of what could be in your future        Step 5: When I am in crisis, I can ask these people to help me use my safety plan:                 Name: Sidney  Step 6: Making the environment safe:   go to sleep/daydream    Step 7: Professionals or agencies I can contact during a crisis:    Suicide Prevention Lifeline: 5-002-753-TALK (2500)  Crisis Text Line Service (available 24 hours a day, 7 days a week): Text [place] to 214892  Call  **CRISIS (159373) from a cell phone to talk to " a team of professionals who can help you.    Crisis Services By County: Phone Number:   [place]     [phone]   [place]    [phone]   [place]    [phone]   [place]    [phone]   [place]    [phone]   [place] [phone]   [place]     [phone]     Call 911 or go to my nearest emergency department.     I helped develop this safety plan and agree to use it when needed.  I have been given a copy of this plan.      Client signature _________________________________________________________________  Today s date:  6/6/2024  Adapted from Safety Plan Template 2008 Randi Poole and Robby Barba is reprinted with the express permission of the authors.  No portion of the Safety Plan Template may be reproduced without the express, written permission.  You can contact the authors at bhs@[email or web address] or [email or web address]

## 2024-06-06 NOTE — PLAN OF CARE
Care Coordinator Note(s):    Care Request(s):   Therapy  Preferences: Time Frame: 2 Weeks, In Person, Female Provider  Notes: She lives in [facility] so preferable closer to home but says she can go anywhere. Previoius provider was virtual and she wants in person.      Care Outcome(s):    CC Progress Note(s)/ Documentation:      [facility] [address] [place] [address]  Phone: [phone]  Fax: [phone]  Online referral for therapy submitted 6/7/24.  Writer call clinic to follow up on referral 6/10/24.   [facility] [address] [facility] [address], [place] [address]  Phone: [phone]  Left  6/7/24    [facility]  No providers available.   [facility] [place]  Provider accepting new patients is not in network with Medicare.      Appointment: Therapy  Date/time:  Wednesday July 10th, 2024 @ 1:00 PM In person  Provider:  ASHKAN OCONNOR   Address: [facility] [address] [place] [address]  Phone: [phone]  Fax: [phone]  Note:   This is a 60 minute appointment. Intake paperwork to be completed beforehand will be sent to [email or web address]. Please arrive 30 minutes early if you prefer to complete paperwork in person.       -Eli Kline  Adult Behavioral Health Care Coordinator

## 2024-06-06 NOTE — TELEPHONE ENCOUNTER
**Patient Navigator Follow Up**    6/6/2024;    Patient is currently IP on station 20NB  Unit Phone Number [phone]   CTC: Lorena Victoria    Referral for IOP-ADT;   55 plus  Schedule Preferences: Schedule Preference: Afternoons Preferred      *I connected with CTC, Lorena  Patient has been added to the 55 plus waitlist and program will follow up with her accordingly.    I sent patient Program Content via [employer].    Thank you,    Orly CANALES  Patient Navigator

## 2024-06-06 NOTE — PLAN OF CARE
"Pt presented with a bright affect. She said that she feels better today as compared to yesterday. She thinks that gabapentin is helping her with her neuropathy. Pt thinks that her feeling flushed comes from too much sugar from the ginger ale. Encouraged pt to talk to the team. In the morning pt pain was 5/10 after she was given scheduled pain medication. That pain increased to 7/10 as time approached the next schedule. Pain is managed with  scheduled meds.     Pt reported that the milieu was was \"chaotic\" last night and that she just stayed away from it. Encouraged pt for her positive coping. Pt sits by the lounge area to eat and watch TV. She is interacting appropriately with peers.      PRNs given: Tylenol Gabapentin.     Problem: Adult Behavioral Health Plan of Care  Goal: Patient-Specific Goal (Individualization)  Description: You can add care plan individualizations to a care plan. Examples of Individualization might be:  \"Parent requests to be called daily at 9am for status\", \"I have a hard time hearing out of my right ear\", or \"Do not touch me to wake me up as it startles  me\".  Outcome: Progressing  Goal: Adheres to Safety Considerations for Self and Others  Outcome: Progressing  Intervention: Develop and Maintain Individualized Safety Plan  Recent Flowsheet Documentation  Taken 6/6/2024 1300 by Yoselin Garcia RN  Safety Measures:   environmental rounds completed   safety rounds completed     Problem: Suicide Risk  Goal: Absence of Self-Harm  Outcome: Progressing  Intervention: Assess Risk to Self and Maintain Safety  Recent Flowsheet Documentation  Taken 6/6/2024 0830 by Yoselin Garcia RN  Self-Harm Prevention: environmental self-harm risks assessed  Enhanced Safety Measures: pain management  Intervention: Promote Psychosocial Wellbeing  Recent Flowsheet Documentation  Taken 6/6/2024 0830 by Yoselin Garcia RN  Supportive Measures: self-responsibility promoted  Sleep/Rest " Enhancement: medication  Family/Support System Care:   self-care encouraged   involvement promoted  Intervention: Establish Safety Plan and Continuity of Care  Recent Flowsheet Documentation  Taken 6/6/2024 0830 by Yoselin Garcia RN  Safe Transition Promotion: protective factors promoted     Problem: Depression  Goal: Improved Mood  Outcome: Progressing  Intervention: Monitor and Manage Depressive Symptoms  Recent Flowsheet Documentation  Taken 6/6/2024 0830 by Yoselin Garcia RN  Supportive Measures: self-responsibility promoted  Family/Support System Care:   self-care encouraged   involvement promoted     Problem: Suicidal Behavior  Goal: Suicidal Behavior is Absent or Managed  Outcome: Progressing  Intervention: Provide Immediate and Ongoing Protective Physical Environment  Recent Flowsheet Documentation  Taken 6/6/2024 0830 by Yoselin Garcia RN  Safe Transition Promotion: protective factors promoted     Problem: Sleep Disturbance  Goal: Adequate Sleep/Rest  Outcome: Progressing  Intervention: Promote Sleep/Rest  Recent Flowsheet Documentation  Taken 6/6/2024 0830 by Yoselin Garcia RN  Sleep/Rest Enhancement: medication     Problem: Fall Injury Risk  Goal: Absence of Fall and Fall-Related Injury  Outcome: Progressing   Goal Outcome Evaluation:    Plan of Care Reviewed With: patient

## 2024-06-06 NOTE — PROGRESS NOTES
LOCUS Worksheet     Name: Randi Cleary MRN: [id number]    : 1955      Gender:  female    PMI:  NA   Provider Name: [facility]   Provider NPI:  [id number]    Actual level of Care Provided:  none.    Service(s) receiving or referred to:  [facility]-55+ afternoon group (1-4 pm).    Reason for Variance: due to worsening mental health symptoms and suicidal ideations.      Rating completed by: Nigel Garcia LPCC/JUSTIN      I. Risk of Harm:   3      Moderate Risk of Harm    II. Functional Status:   3      Moderate Impairment    III. Co-Morbidity:   2      Minor Co-Morbidity    IV - A. Recovery Environment - Level of Stress:   3      Moderately Stress Environment    IV - B. Recovery Environment - Level of Support:   3      Limited Support in Environment    V. Treatment and Recovery History:   3      Moderate to Equivocal Response to Treatment and Recovery Management    VI. Engagement and Recovery Project:   2      Positive Engagement and Recovery       19 Composite Score    Level of Care Recommendation:   17 to 19       High Intensity Community Based Services

## 2024-06-06 NOTE — PROGRESS NOTES
CLINICAL NUTRITION SERVICES - BRIEF NOTE     Nutrition Prescription    Recommendations already ordered by Registered Dietitian (RD):  Continue current orders    Future/Additional Recommendations:  RD to sign off at this time, but will remain available by consult if new nutrition problem arises.       REASON FOR ASSESSMENT  Randi Cleary is a/an 68 year old female assessed by the dietitian for Patient/Family Request    Findings  Received patient request consult for salads with lunch and dinner. Writer met with pt on 6/3 and approved write-in for green salad.     INTERVENTIONS  Implementation  Modify composition of meals/snacks      Follow up/Monitoring  RD to sign off at this time, but will remain available by consult if new nutrition problem arises.      Shakira Wetzel, MPH, RDN, LD  Behavioral Health Adult & Pediatric Dietitian  Contact via [employer] or Desk Phone: [phone]

## 2024-06-07 ENCOUNTER — ANESTHESIA (OUTPATIENT)
Dept: BEHAVIORAL HEALTH | Facility: CLINIC | Age: 69
DRG: 881 | End: 2024-06-07
Payer: MEDICARE

## 2024-06-07 ENCOUNTER — ANESTHESIA EVENT (OUTPATIENT)
Dept: BEHAVIORAL HEALTH | Facility: CLINIC | Age: 69
DRG: 881 | End: 2024-06-07
Payer: MEDICARE

## 2024-06-07 ENCOUNTER — APPOINTMENT (OUTPATIENT)
Dept: BEHAVIORAL HEALTH | Facility: CLINIC | Age: 69
DRG: 881 | End: 2024-06-07
Payer: MEDICARE

## 2024-06-07 PROCEDURE — 250N000013 HC RX MED GY IP 250 OP 250 PS 637

## 2024-06-07 PROCEDURE — 250N000013 HC RX MED GY IP 250 OP 250 PS 637: Performed by: PHYSICIAN ASSISTANT

## 2024-06-07 PROCEDURE — 124N000002 HC R&B MH UMMC

## 2024-06-07 PROCEDURE — 250N000009 HC RX 250: Performed by: ANESTHESIOLOGY

## 2024-06-07 PROCEDURE — 370N000017 HC ANESTHESIA TECHNICAL FEE, PER MIN: Performed by: ANESTHESIOLOGY

## 2024-06-07 PROCEDURE — 90870 ELECTROCONVULSIVE THERAPY: CPT | Performed by: PSYCHIATRY & NEUROLOGY

## 2024-06-07 PROCEDURE — 90870 ELECTROCONVULSIVE THERAPY: CPT

## 2024-06-07 PROCEDURE — 250N000011 HC RX IP 250 OP 636: Performed by: ANESTHESIOLOGY

## 2024-06-07 PROCEDURE — 250N000011 HC RX IP 250 OP 636: Mod: JZ | Performed by: PSYCHIATRY & NEUROLOGY

## 2024-06-07 PROCEDURE — 99232 SBSQ HOSP IP/OBS MODERATE 35: CPT | Mod: 25 | Performed by: PSYCHIATRY & NEUROLOGY

## 2024-06-07 PROCEDURE — 90870 ELECTROCONVULSIVE THERAPY: CPT | Performed by: ANESTHESIOLOGY

## 2024-06-07 RX ORDER — LABETALOL HYDROCHLORIDE 5 MG/ML
INJECTION, SOLUTION INTRAVENOUS PRN
Status: DISCONTINUED | OUTPATIENT
Start: 2024-06-07 | End: 2024-06-07

## 2024-06-07 RX ORDER — METHOHEXITAL IN WATER/PF 100MG/10ML
SYRINGE (ML) INTRAVENOUS PRN
Status: DISCONTINUED | OUTPATIENT
Start: 2024-06-07 | End: 2024-06-07

## 2024-06-07 RX ORDER — KETOROLAC TROMETHAMINE 30 MG/ML
30 INJECTION, SOLUTION INTRAMUSCULAR; INTRAVENOUS ONCE
Status: DISCONTINUED | OUTPATIENT
Start: 2024-06-07 | End: 2024-06-07

## 2024-06-07 RX ADMIN — SUCCINYLCHOLINE CHLORIDE 90 MG: 20 INJECTION, SOLUTION INTRAMUSCULAR; INTRAVENOUS; PARENTERAL at 13:05

## 2024-06-07 RX ADMIN — ACETAMINOPHEN 650 MG: 325 TABLET, FILM COATED ORAL at 10:09

## 2024-06-07 RX ADMIN — ROPINIROLE HYDROCHLORIDE 2 MG: 2 TABLET, FILM COATED ORAL at 20:25

## 2024-06-07 RX ADMIN — KETOROLAC TROMETHAMINE 30 MG: 30 INJECTION, SOLUTION INTRAMUSCULAR at 12:59

## 2024-06-07 RX ADMIN — ACETAMINOPHEN 650 MG: 325 TABLET, FILM COATED ORAL at 04:49

## 2024-06-07 RX ADMIN — FLUTICASONE FUROATE AND VILANTEROL TRIFENATATE 1 PUFF: 100; 25 POWDER RESPIRATORY (INHALATION) at 08:17

## 2024-06-07 RX ADMIN — UMECLIDINIUM 1 PUFF: 62.5 AEROSOL, POWDER ORAL at 08:17

## 2024-06-07 RX ADMIN — Medication 100 MG: at 13:05

## 2024-06-07 RX ADMIN — Medication 1000 MG: at 13:53

## 2024-06-07 RX ADMIN — ROPINIROLE HYDROCHLORIDE 2 MG: 2 TABLET, FILM COATED ORAL at 14:43

## 2024-06-07 RX ADMIN — OXYCODONE HYDROCHLORIDE 5 MG: 5 TABLET ORAL at 06:25

## 2024-06-07 RX ADMIN — MAGNESIUM OXIDE TAB 400 MG (241.3 MG ELEMENTAL MG) 400 MG: 400 (241.3 MG) TAB at 20:23

## 2024-06-07 RX ADMIN — Medication 500 MCG: at 13:53

## 2024-06-07 RX ADMIN — OXYCODONE HYDROCHLORIDE 5 MG: 5 TABLET ORAL at 17:51

## 2024-06-07 RX ADMIN — LABETALOL HYDROCHLORIDE 20 MG: 5 INJECTION, SOLUTION INTRAVENOUS at 13:05

## 2024-06-07 RX ADMIN — MAGNESIUM OXIDE TAB 400 MG (241.3 MG ELEMENTAL MG) 400 MG: 400 (241.3 MG) TAB at 13:53

## 2024-06-07 RX ADMIN — ROPINIROLE HYDROCHLORIDE 2 MG: 2 TABLET, FILM COATED ORAL at 22:58

## 2024-06-07 RX ADMIN — ALLOPURINOL 300 MG: 300 TABLET ORAL at 20:26

## 2024-06-07 RX ADMIN — GUAIFENESIN 600 MG: 600 TABLET ORAL at 08:17

## 2024-06-07 RX ADMIN — LEVOTHYROXINE SODIUM 150 MCG: 75 TABLET ORAL at 06:25

## 2024-06-07 RX ADMIN — PANTOPRAZOLE SODIUM 40 MG: 40 TABLET, DELAYED RELEASE ORAL at 08:17

## 2024-06-07 RX ADMIN — OXYCODONE HYDROCHLORIDE 5 MG: 5 TABLET ORAL at 13:53

## 2024-06-07 RX ADMIN — Medication 12.5 MG: at 08:18

## 2024-06-07 RX ADMIN — POTASSIUM CHLORIDE 20 MEQ: 1500 TABLET, EXTENDED RELEASE ORAL at 13:53

## 2024-06-07 RX ADMIN — GABAPENTIN 400 MG: 400 CAPSULE ORAL at 22:58

## 2024-06-07 RX ADMIN — GUAIFENESIN 600 MG: 600 TABLET ORAL at 20:26

## 2024-06-07 ASSESSMENT — ACTIVITIES OF DAILY LIVING (ADL)
ADLS_ACUITY_SCORE: 41
HYGIENE/GROOMING: INDEPENDENT
ADLS_ACUITY_SCORE: 41
DRESS: SCRUBS (BEHAVIORAL HEALTH)
ADLS_ACUITY_SCORE: 41
ADLS_ACUITY_SCORE: 41
LAUNDRY: WITH SUPERVISION
ADLS_ACUITY_SCORE: 41
ADLS_ACUITY_SCORE: 41

## 2024-06-07 ASSESSMENT — COPD QUESTIONNAIRES: COPD: 1

## 2024-06-07 NOTE — PLAN OF CARE
Assessment/Intervention/Current Symtoms and Care Coordination:  Chart reviewed and patient met with team,   Discussed patient progress, symptomology, and response to treatment.  Discussed the discharge plan and any potential impediments to discharge.     Patient continues course of ECT without complication.  ECT #6 scheduled for today.  Mood has appeared improved.  Affect has been brighter. States she continues to be feeling better than when admitted. Patient has continued to actively participate in groups, has been social with peers.  Writer discussed the possibility of completing ECT series as an outpatient. Patient stated she doesn't feel comfortable doing this as an outpatient at this point. Will approach this again next week.     Patient completed 55 Plus intake.  Will coordinate start date when discharge is known.  Patient very grateful she can go back to this program.     Writer was able to reschedule Psychiatry for patient- noted in AVS     Discharge Plan or Goal:  Patient will return home when stable  Psychiatry  Therapy  55 Plus     Barriers to Discharge:  Severity of depression/inability to function  Initiation of ECT- unable to do ECT as outpatient     Referral Status:  55 PLus IOP -intake completed 6/6  Therapist- referral in process     Legal Status:  Voluntary     Contacts:  Outpatient Psychiatrist: Jeannette Adams  Psychiatry  Therapy: Arsenio Wylie  Psychiatry- SLP  Primary Physician: Laith Constantino  Family Members: Justin Cleary (Spouse) [phone]     Upcoming Meetings and Dates/Important Information and next steps:  Team update:  Wed

## 2024-06-07 NOTE — ANESTHESIA PREPROCEDURE EVALUATION
Anesthesia Pre-Procedure Evaluation    Patient: Randi Cleary   MRN: [id number] : 1955        Procedure : *ECT          Past Medical History:   Diagnosis Date    Bipolar 2 disorder (H)     Breast cancer (H)     lumpectomy, radiation, chemo    Chronic pain syndrome     COPD (chronic obstructive pulmonary disease) (H)     asthma    Cord compression (H) 2021    Dizzy     Drug tolerance     opioid    Esophageal reflux     Fatigue     Generalized anxiety disorder     Graves disease     Hemochromatosis 2018    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; H63D not detected    History of breast cancer 2020    Formatting of this note might be different from the original. Created by Conversion  Replacement Utility updated for latest IMO load Formatting of this note might be different from the original. Created by Conversion  Replacement Utility updated for latest IMO load    History of corticosteroid therapy 2019    History of partial adrenalectomy (H24) 2019    History of pheochromocytoma 2019    Hx antineoplastic chemotherapy     Hx of radiation therapy     Hyperlipidaemia     Hypertension     Impaired fasting glucose     Injury of neck, whiplash 07/15/2021    Joint pain     KYAW (obstructive sleep apnea) 2016    Osteopenia     Pheochromocytoma, left 2017    laparoscopically removed    Postablative hypothyroidism 1995    Prediabetes 10/03/2019    by A1c    Psoriasis     Psoriatic arthropathy (H)     Right rotator cuff tear     RLS (restless legs syndrome)     on ropinorole    Sacroiliitis (H24)     Serotonin syndrome 2020    [facility] - While on desvenlafaxine 100mg    Snoring     Spinal stenosis     Status post coronary angiogram 10/03/2019    Urinary incontinence     Vitamin B 12 deficiency 2009    Vitamin D deficiency 2010      Past Surgical History:   Procedure Laterality Date    ARTHRODESIS ANKLE      ARTHROPLASTY ANKLE Right 2015    Procedure: ARTHROPLASTY  ANKLE;  Surgeon: Jason Coughlin MD;  Location: [facility]    ARTHROPLASTY REVISION ANKLE Right 6/29/2015    Procedure: ARTHROPLASTY REVISION ANKLE;  Surgeon: Jason Coughlin MD;  Location: [facility]    BIOPSY BREAST      BREAST BIOPSY, CORE RT/LT      COLONOSCOPY      COLONOSCOPY N/A 2/25/2021    Procedure: COLONOSCOPY;  Surgeon: Guru Elke Tolbert MD;  Location:  GI    CV CORONARY ANGIOGRAM N/A 10/3/2019    Procedure: CV CORONARY ANGIOGRAM;  Surgeon: Bryce Pierre MD;  Location:  HEART CARDIAC CATH LAB    CV RIGHT HEART CATH MEASUREMENTS RECORDED N/A 10/3/2019    Procedure: CV RIGHT HEART CATH;  Surgeon: Bryce Pierre MD;  Location:  HEART CARDIAC CATH LAB    ESOPHAGOSCOPY, GASTROSCOPY, DUODENOSCOPY (EGD), COMBINED N/A 2/25/2021    Procedure: ESOPHAGOGASTRODUODENOSCOPY, WITH BIOPSY;  Surgeon: Guru Elke Tolbert MD;  Location:  GI    EYE SURGERY  2021    HC REMOVE TONSILS/ADENOIDS,<11 Y/O      Description: Tonsillectomy With Adenoidectomy;  Recorded: 04/07/2010;    IR LUMBAR EPIDURAL STEROID INJECTION  10/26/2004    IR LUMBAR EPIDURAL STEROID INJECTION  11/16/2004    IR LUMBAR EPIDURAL STEROID INJECTION  12/21/2004    IR LUMBAR EPIDURAL STEROID INJECTION  6/8/2006    JOINT REPLACEMENT      LAMINOPLASTY CERVICAL POSTERIOR THREE+ LEVELS Left 12/21/2021    Procedure: CERVICAL 3-CERVICAL 6 LEFT OPEN DOOR LAMINOPLASTY AND LEFT CERVICAL 4-5 AND CERVICAL 6-7 POSTERIOR FORAMINOTOMY;  Surgeon: Angela Gregory MD;  Location: [facility]    LAPAROSCOPIC ADRENALECTOMY Left 08/02/2017    pheochromocytoma    LAPAROSCOPIC ADRENALECTOMY Left 8/2/2017    Procedure: LAPAROSCOPIC LEFT ADRENALECTOMY, ;  Surgeon: Gab Linares MD;  Location: [facility];  Service:     LENGTHEN TENDON ACHILLES Right 6/29/2015    Procedure: LENGTHEN TENDON ACHILLES;  Surgeon: Jason Coughlin MD;  Location: [facility]    LUMPECTOMY BREAST      LUMPECTOMY BREAST Left 1994  "   MAMMOPLASTY REDUCTION Right 2015    [place]    MAMMOPLASTY REDUCTION Right     approx late /    MASTECTOMY      left lumpectomy with axillary node dissection    MASTECTOMY MODIFIED RADICAL      OTHER SURGICAL HISTORY Right     reconstructive breast surgery    OTHER SURGICAL HISTORY      Adrenalectomy for pheochromocytoma    [place] MASTECTOMY, MODIFIED RADICAL      Description: Modified Radical Mastectomy Left Breast;  Recorded: 2010;    REPAIR HAMMER TOE Right 2015    Procedure: REPAIR HAMMER TOE;  Surgeon: Jason Coughlin MD;  Location: [facility]    TONSILLECTOMY      TONSILLECTOMY & ADENOIDECTOMY      ZC ARTHRODESIS,ANKLE,OPEN Right     Description: Ankle Arthrodesis;  Recorded: 2010;      Allergies   Allergen Reactions    Serotonin Reuptake Inhibitors (Ssris) Anxiety, Difficulty breathing, Headache, Palpitations and Shortness Of Breath    Buspirone      The patient states she had serotonin syndrome    Cephalexin      Other reaction(s): unknown rxn.    Desvenlafaxine      Serotonin syndrome    Diclofenac Sodium [Diclofenac]      Serotonin syndrome and restless legs syndrome    Gabapentin      Drove on the wrong side of the highway    Levofloxacin      \"CAN'T REMEMBER\"    Penicillins      \"SORES IN MOUTH\"    Riluzole Difficulty breathing and Swelling    Sulfa Antibiotics      \"PT DOES NOT KNOW WHAT THE REACTION WAS\"    Topiramate Other (See Comments)     Frequent urination      Social History     Tobacco Use    Smoking status: Former     Current packs/day: 0.00     Average packs/day: 2.5 packs/day for 29.2 years (72.9 ttl pk-yrs)     Types: Cigarettes     Start date: 1971     Quit date: 2000     Years since quittin.9     Passive exposure: Past    Smokeless tobacco: Never   Substance Use Topics    Alcohol use: Not Currently     Comment: relapse 2021 sober       Wt Readings from Last 1 Encounters:   24 89.1 kg (196 lb 6.4 oz)        Anesthesia Evaluation   Pt " has had prior anesthetic.     No history of anesthetic complications       ROS/MED HX  ENT/Pulmonary:     (+) sleep apnea, uses CPAP,                        COPD,              Neurologic:       Cardiovascular: Comment: Pulmonary HTN.  Followed by Cardiology.  Next right heart cath scheduled for 6/19/2024.    (+)  hypertension- -   -  - -                                 Previous cardiac testing   Echo: Date: Results:    Stress Test:  Date: Results:    ECG Reviewed:  Date: 5/21/2024 Results:  RBBB, sinus rhythm  Cath:  Date: Results:      METS/Exercise Tolerance:     Hematologic:       Musculoskeletal: Comment: S/P Cervical Surgery - laminectomy   SIJ pain & joint pain  Osteopenia      GI/Hepatic: Comment: Hepatic steatosis    (+) GERD,                   Renal/Genitourinary:       Endo: Comment: Graves Disease. S/P Radioiodine Tx.    Hx of pheochromocytoma, S/P Left Adrenalectomy 8/2017    (+)          thyroid problem,     Obesity,       Psychiatric/Substance Use: Comment: MDD, recurrent, severe, with anxious distress      Infectious Disease:       Malignancy:   (+) Malignancy, History of Breast.Breast CA Remission status post Surgery and Chemo.      Other:            Physical Exam    Airway  airway exam normal      Mallampati: II   TM distance: > 3 FB   Neck ROM: full   Mouth opening: > 3 cm    Respiratory Devices and Support         Dental       (+) Minor Abnormalities - some fillings, tiny chips      Cardiovascular   cardiovascular exam normal          Pulmonary   pulmonary exam normal            Other findings: S/P Cervical Surgery, good ROM  OUTSIDE LABS:  CBC:   Lab Results   Component Value Date    WBC 7.9 06/04/2024    WBC 7.1 05/22/2024    HGB 17.0 (H) 06/04/2024    HGB 17.7 (H) 05/22/2024    HCT 48.1 (H) 06/04/2024    HCT 49.9 (H) 05/22/2024     06/04/2024     05/22/2024     BMP:   Lab Results   Component Value Date     06/04/2024     05/22/2024    POTASSIUM 4.3 06/04/2024     POTASSIUM 5.2 06/04/2024    CHLORIDE 103 06/04/2024    CHLORIDE 104 05/22/2024    CO2 21 (L) 06/04/2024    CO2 24 05/22/2024    BUN 17.3 06/04/2024    BUN 9.3 05/22/2024    CR 0.53 06/04/2024    CR 0.57 05/22/2024    GLC 96 06/04/2024     (H) 05/22/2024     COAGS:   Lab Results   Component Value Date    PTT 34 12/13/2021    INR 0.94 12/13/2021     POC:   Lab Results   Component Value Date     (H) 02/25/2021     HEPATIC:   Lab Results   Component Value Date    ALBUMIN 3.7 06/04/2024    PROTTOTAL 7.2 06/04/2024    ALT 33 06/04/2024    AST 35 06/04/2024    ALKPHOS 80 06/04/2024    BILITOTAL 0.4 06/04/2024     OTHER:   Lab Results   Component Value Date    A1C 5.6 05/22/2024    LINDA 9.5 06/04/2024    MAG 1.9 05/22/2024    TSH 1.69 06/04/2024    T4 1.49 03/29/2024    T3 114 01/18/2023    CRP <2.9 11/16/2021    SED 8 10/17/2023       Anesthesia Plan    ASA Status:  3    NPO Status:  NPO Appropriate    Anesthesia Type: General.     - Airway: Mask Only   Induction: Intravenous.   Maintenance: N/A.        Consents    Anesthesia Plan(s) and associated risks, benefits, and realistic alternatives discussed. Questions answered and patient/representative(s) expressed understanding.     - Discussed: Risks, Benefits and Alternatives for BOTH SEDATION and the PROCEDURE were discussed     - Discussed with:  Patient      - Extended Intubation/Ventilatory Support Discussed: No.      - Patient is DNR/DNI Status: No     Use of blood products discussed: No .     Postoperative Care            Comments:    Other Comments: ECT           Carmen Saldana MD    I have reviewed the pertinent notes and labs in the chart from the past 30 days and (re)examined the patient.  Any updates or changes from those notes are reflected in this note.

## 2024-06-07 NOTE — TELEPHONE ENCOUNTER
Patient was on stim list for zolpidem for study scheduled 6/16/24     reviewed-last fill 20 tabs, no refills from non sleep provider    Pharmacy and med pended    Eli SIMENTAL RN  [facility]

## 2024-06-07 NOTE — PLAN OF CARE
BEH IP Unit Acuity Rating Score (UARS)  Patient is given one point for every criteria they meet.     CRITERIA SCORING   On a 72 hour hold, court hold, committed, stay of commitment, or revocation. 0    Patient LOS on BEH unit exceeds 20 days. 0  LOS: 17   Patient under guardianship, 55+, otherwise medically complex, or under age 11. 1   Suicide ideation without relief of precipitating factors. 0   Current plan for suicide. 0   Current plan for homicide. 0   Imminent risk or actual attempt to seriously harm another without relief of factors precipitating the attempt. 0   Severe dysfunction in daily living (ex: complete neglect for self care, extreme disruption in vegetative function, extreme deterioration in social interactions). 1   Recent (last 7 days) or current physical aggression in the ED or on unit. 0   Restraints or seclusion episode in past 72 hours. 0   Recent (last 7 days) or current verbal aggression, agitation, yelling, etc., while in the ED or unit. 0   Active psychosis. 0   Need for constant or near constant redirection (from leaving, from others, etc).  0   Intrusive or disruptive behaviors. 0   Patient requires 3 or more hours of individualized nursing care per 8-hour shift (i.e. for ADLs, meds, therapeutic interventions). 0   TOTAL 2

## 2024-06-07 NOTE — PROGRESS NOTES
"  ----------------------------------------------------------------------------------------------------------  [facility]  Psychiatry Progress Note  Hospital Day #17     Subjective:     Patient Interview: Winnie was interviewed in the conference room. She said she is feeling anxious before ECT. 6/10 anxiety. She said she is mostly worried about the IV as she does not like these. Last night went well she felt. Sleep has been better here but did have less sleep last night. Doesn't know why she doesn't sleep well at home even though she has a new Tempur-Pedic bed. She says she thinks it could be the egg crate mattress topper or angle of the bed. Her  plays a calming noise which she says she doesn't do well with. She says \"she may be anxious but she isnt going to wimp out\" with respect to ECT. Feels it has been helping. Feels nothing else has helped. Says yesterday was a really good day. Does not have any additional concerns or questions. She plans to talk to her two nursing friends who plan to visit [facility] with her . Will talk to them about Auvelity.     Objective:     Vitals:  /74 (BP Location: Right arm, Patient Position: Sitting, Cuff Size: Adult Large)   Pulse 75   Temp 97  F (36.1  C)   Resp 16   Ht 1.676 m (5' 6\")   Wt 89.1 kg (196 lb 6.4 oz)   LMP  (LMP Unknown)   SpO2 93%   BMI 31.70 kg/m      Mental Status Exam:  Oriented to:  Grossly Oriented  General:  Awake and Alert  Appearance:  appears stated age and Grooming is adequate  Behavior/Attitude:  cooperative and open conversational  Eye Contact:  appropriate  Psychomotor: normal and no evidence of tics, dystonia, or tardive dyskinesia no catatonia present  Speech:  appropriate volume/tone and talkative  Language: Fluent in English with appropriate syntax and vocabulary.  Mood:  \"nervous\"  Affect:  smiling and cheerful  Thought Process:  coherent, goal directed, and circumstantial  Thought " Content:  No HI, No VH, and No AH, no SI; No apparent delusions  Associations:  intact  Insight:  fair due to recognizing the circumstances of her mental health, has a very positive attitude towards her treatment  Judgment:  fair due to willingness to engage in ECT  Impulse control: good  Attention Span:  grossly intact  Concentration:  grossly intact  Recent and Remote Memory:  not formally assessed  Fund of Knowledge:  average  Muscle Strength and Tone: normal  Gait and Station: Normal     Psychiatric Assessment     Randi Cleary is a 68 year old female previously diagnosed with MDD, recurrent severe, substance induced mood disorder, Unspecified Trauma, CHAVO, borderline personality disorder and alcohol use disorder who presented voluntarily with SI in the context of treatment resistant MDD. On admission she presented with significant symptoms of depression incuding emotional lability, low mood, hopelessness, amotivation, anxiety, anhedonia, low energy, insomnia low appetite, excess guilt and poor concentration. Her affect was dysphroic and anxious at times with heavy perseveration on medico-surgical dignosises and passive SI causing incresed anxiety. She notes she has had a plan but no intent. Her history of extensive substance use, medical history and chronic pain contribute biologically. Her presentation is further complicated by borderline personality disorder and history of trauma. Additionally, she has a very limited support system, interpersonal conflicts with friends and her spouse. However her last hospitalization was in the 1980's for a suicide attempt via prozac overdose and has been engaged in treatment and present voluntarily. Her presentation is consistent with decompensated Major Depressive disorder, recurrent severe. Her disorder has been treatment resistant including to TMS and at this time, ECT would be the best course of action to address her symptom severity. Patient warrants inpatient  psychiatric hospitalization to maintain her safety.      Psychiatric Plan by Diagnosis      Today's changes:  - ECT #6 performed today     # MDD, recurrent, severe, with anxious distress  - Patient suffers from treatment resistant depression and has trialed many medications. At this point treatment team will need to do a chart review to review medication trials and determine the best medication to address depressive symptoms long term  - Pt started ECT 5/24 : Hold high-dose gabapentin/pregabalin after 6pm on the day before ECT (to permit adequate seizure)   - Continue ECT MWF     #Insomnia  - Zolpidem 5mg qpm prn    Pertinent Labs/Monitoring:   - EKG 5/22 - Qtc 458 NSR, RBBB     Additional Plans:  - Patient will be treated in therapeutic milieu with appropriate individual and group therapies as described     Psychiatric Hospital Course:      Randi Cleary was admitted to [facility] as a voluntary patient for electroconvulsive therapy for lifelong history of depression. Multiple medication trials were not effective. Symptoms notable for low mood, anhedonia, sleep difficulties, guilt and worthlessness, suicidal ideation with plan without intent.   6/6 - Winnie reported she is feeling better and her affect appeared to support this assertion. It appeared that the current treatment plan is resulting in improvements to her mood, though she is not yet ready to go home she said. She has completed 6 ECT sessions with improvement in sxs. She wishes to complete a total of 12 sessions.     The risks, benefits, alternatives, and side effects were discussed and understood by the patient.     Medical Assessment and Plan     Medical diagnoses to be addressed this admission:    # Hypothyroidism  - PTA 150mcg M-Sa, 75mcg on Perez     # KYAW   - Doesn't use CPAP at home, sleep study on 06/2024      # RLS  - Continue PTA Ropinirole 2 mg PO TID  - Gabapentin 400mg qpm  - Magnesium Citrated changed to Magnesium Oxide 400mg BID (per  patient and medicine)    #RBBB:  Previously on other EKGs    #Cervical radiculopathy and myelopathy s/p cervical laminoplasty 12/2021    # Chronic Pain  - Continue PTA Roxicodone 5 mg q6h + 5 mg PRN (for max daily dose of 25 mg)  - Menthol 2.5% topical gel q6hrs prn  - TENS Unit     #Dizziness  #Headache  Reports hx of migraines, no vision changes or hearing changes. notes this has been ongoing since she had her spinal surgery.   - CT head 5/22 unremarkable    #Gout  - Nutrition consult  - Allopurinol 300mg qpm  - Flares in left podagra but none currently    #Vitamin B12 Deficiency  - Vit B12 500mcg qday    #Vitamin C Deficiency  - Ascorbic Acid 1000mg    #Vitamin D Deficiency  - 250mcg weekly    #COPD  - Breo Ellipta 1 puff daily   - Incruse Ellipta 1 puff daily  - Tessalon cap 200mg TID prn  - Albuterol 2 puffs q6hr prn  - Mucinex 1200mg BID prn     #GERD  #Hiatal Hernia  - Protonix 40mg daily    #Possible pulmonary HTN  #HTN  Per Medicine note, pt follows with Cardiology here and has scheduled right heart catheterization for 06/12/2024. Has follow up with Cardiology in clinic scheduled for 06/19/2024. PTA edecrin 12.5mg daily and potassium supplement. Electrolytes and renal function stable.Recently lost 50lbs and BP has improved.   - Ethacrynic Acid 12.5mg qday   -Hold Edecrin on date of ECT, continue 12.5mg daily for now  -Continue potassium supplement   - Klorcon 20meq daily     #Hemochromatosis, hereditary  - Hgb Stable at ~17    #Hepatic Steatosis  - Stable  - LFTs wnl, no abdominal pain, distention, N/V    #History of breast cancer s/p mastectomy, chemo and XRT:   - currently in remission      #Alcohol use disorder, in remission:   - No current use. Two years sober     #Hx of pheochromocytoma s/p left adrenalectomy 08/2017:   - Stable  - Follows Endocrinology for this.     #Psoriasis:  - Noted on R hang  - Uses scalp brush    Medical course: Patient was physically examined by the ED prior to being  transferred to the unit and was found to be medically stable and appropriate for admission. Medicine consult was done to provide clearance for ECT. Due to patient's persistent neck pain after surgery in 2021, CT Head was done which was negative. Oxycodone changed from q6hr prn to scheduled with an extra 5mg in the day as needed per PTA regimen. Patient continued to express some nerve pain so gabapentin was increased to 400mg.     Medical course: Patient was physically examined by the ED prior to being transferred to the unit and was found to be medically stable and appropriate for admission.     Consults: Dietician, Medicine for ECT Clearance, ECT Consult, Nutrition     Checklist     Legal Status: Voluntary     Safety Assessment:   Behavioral Orders   Procedures    Code 1 - Restrict to Unit    Code 2 - 1:1 Staff Supervision     For coming down to ECT only    Discontinue 1:1 attendant for suicide risk     Order Specific Question:   I have performed an in person assessment of the patient     Answer:   Based on this assessment the patient no longer requires a one on one attendant at this point in time.     Order Specific Question:   Rationale     Answer:   Patient States able to remain safe in hospital     Order Specific Question:   Rationale     Answer:   Modifications to care environment made to mitigate safety risk     Order Specific Question:   Rationale     Answer:   Routine observations are sufficient to monitor safety.    Electroconvulsive therapy     Series of up to 12 treatments. Begin Date: 5/24/24     Treating Psychiatrist providing ECT:  Ruthann     Notified on:  5/21/24    Electroconvulsive therapy     Series of up to 12 treatments. Begin Date: 5/24/24     Treating Psychiatrist providing ECT:  Ruthann     Notified on:  5/21/24    Fall precautions    Routine Programming     As clinically indicated    Status 15     Every 15 minutes.    Suicide precautions: Suicide Risk: MODERATE; Clinical rationale to  override score: lack of access to a plan for self-harm     Order Specific Question:   Suicide Risk     Answer:   MODERATE     Order Specific Question:   Clinical rationale to override score:     Answer:   lack of access to a plan for self-harm       Risk Assessment:  Risk for harm is moderate-high.  Risk factors: SI, maladaptive coping, trauma, and past behaviors  Protective factors: engaged in treatment     SIO: Discontinued    Disposition: Pending stabilization, medication optimization, & development of a safe discharge plan. Pt has been accepted to 55 plus program once stable.      Attestations      Sung Myers, MS3     Resident Attestation:   I was present with the medical student who participated in the service and in the documentation of the note.  I have verified the history and personally performed the physical exam, mental status exam, and medical decision making. I agree with the assessment and plan of care as documented in the note.     Yennifer Martinez MD  Psychiatry Resident Physician    Attestation:  This patient has been seen and evaluated by me, Jairo Choudhary MD.  I have discussed this patient with the house staff team including the resident and medical student and I agree with the findings and plan in this note.    I have reviewed today's vital signs, medications, labs and imaging. Jario Choudhary MD , PhD.

## 2024-06-07 NOTE — ANESTHESIA CARE TRANSFER NOTE
Patient: Randi Cleary    Procedure: * No procedures listed *       Diagnosis: * No pre-op diagnosis entered *  Diagnosis Additional Information: No value filed.    Anesthesia Type:   General     Note:    Oropharynx: spontaneously breathing  Level of Consciousness: drowsy  Oxygen Supplementation: room air    Independent Airway: airway patency satisfactory and stable  Dentition: dentition unchanged  Vital Signs Stable: post-procedure vital signs reviewed and stable  Report to RN Given: handoff report given  Patient transferred to: PACU    Handoff Report: Identifed the Patient, Identified the Reponsible Provider, Reviewed the pertinent medical history, Discussed the surgical course, Reviewed Intra-OP anesthesia mangement and issues during anesthesia, Set expectations for post-procedure period and Allowed opportunity for questions and acknowledgement of understanding      Vitals:  Vitals Value Taken Time   BP     Temp     Pulse     Resp     SpO2         Electronically Signed By: Carmen Saldana MD  June 7, 2024  1:19 PM

## 2024-06-07 NOTE — PROGRESS NOTES
Pt returned to station 20 via ECT.  Pt alert and oriented x3, denies pain, denies nausea, VSS, pt currently eating lunch.

## 2024-06-07 NOTE — PROGRESS NOTES
Patient adequate for discharge back to unit. Report called to unit nurse Winsome. IV removed and hemostasis achieved less than 2 min.  VSS.  Patient safely transported back to unit with  staff persons.

## 2024-06-07 NOTE — PLAN OF CARE
"Pt denies SI, denies depression rates anxiety 9/10.  Pt affect bright at times.  Social with peers.  Pt went down to ECT at approx. 12:35pm. Pt been pleasant and cooperative.  Pt has been medication compliant. Pt talked about how it appears her cat at home misses her.  Problem: Adult Inpatient Plan of Care  Goal: Plan of Care Review  Description: The Plan of Care Review/Shift note should be completed every shift.  The Outcome Evaluation is a brief statement about your assessment that the patient is improving, declining, or no change.  This information will be displayed automatically on your shift  note.  Outcome: Not Progressing  Goal: Patient-Specific Goal (Individualized)  Description: You can add care plan individualizations to a care plan. Examples of Individualization might be:  \"Parent requests to be called daily at 9am for status\", \"I have a hard time hearing out of my right ear\", or \"Do not touch me to wake me up as it startles  me\".  Outcome: Not Progressing  Goal: Absence of Hospital-Acquired Illness or Injury  Outcome: Not Progressing  Goal: Optimal Comfort and Wellbeing  Outcome: Not Progressing  Intervention: Monitor Pain and Promote Comfort  Recent Flowsheet Documentation  Taken 6/7/2024 1013 by Winsome Burns RN  Pain Management Interventions: medication (see MAR)  Goal: Readiness for Transition of Care  Outcome: Not Progressing     Problem: Adult Behavioral Health Plan of Care  Goal: Plan of Care Review  Outcome: Not Progressing  Goal: Patient-Specific Goal (Individualization)  Description: You can add care plan individualizations to a care plan. Examples of Individualization might be:  \"Parent requests to be called daily at 9am for status\", \"I have a hard time hearing out of my right ear\", or \"Do not touch me to wake me up as it startles  me\".  Outcome: Not Progressing  Goal: Adheres to Safety Considerations for Self and Others  Outcome: Not Progressing  Goal: Absence of New-Onset Illness or " Injury  Outcome: Not Progressing  Goal: Optimized Coping Skills in Response to Life Stressors  Outcome: Not Progressing  Goal: Develops/Participates in Therapeutic [place] to Support Successful Transition  Outcome: Not Progressing   Goal Outcome Evaluation:

## 2024-06-07 NOTE — ANESTHESIA POSTPROCEDURE EVALUATION
Patient: Randi Cleary    Procedure: * No procedures listed *       Anesthesia Type:  General    Note:  Disposition: Outpatient   Postop Pain Control: Uneventful            Sign Out: Well controlled pain   PONV: No   Neuro/Psych: Uneventful            Sign Out: Acceptable/Baseline neuro status   Airway/Respiratory: Uneventful            Sign Out: Acceptable/Baseline resp. status   CV/Hemodynamics: Uneventful            Sign Out: Acceptable CV status; No obvious hypovolemia; No obvious fluid overload   Other NRE:    DID A NON-ROUTINE EVENT OCCUR?            Last vitals:  Vitals:    06/06/24 1600 06/06/24 1736 06/07/24 0825   BP: 93/62 138/74    Pulse: 76 75    Resp:  16 16   Temp: 36.2  C (97.2  F) 36.3  C (97.4  F) 36.1  C (97  F)   SpO2: 92%  93%       Electronically Signed By: Carmen Saldana MD  June 7, 2024  1:18 PM

## 2024-06-07 NOTE — PROCEDURES
"Procedures  [facility] [place]   ECT Procedure Note   06/07/2024    Randi Cleary is a 68 year old female patient.  [id number]    Patient Status: IN-patient    Is this the first in a series of 12 treatments?  No      Allergies   Allergen Reactions    Serotonin Reuptake Inhibitors (Ssris) Anxiety, Difficulty breathing, Headache, Palpitations and Shortness Of Breath    Buspirone      The patient states she had serotonin syndrome    Cephalexin      Other reaction(s): unknown rxn.    Desvenlafaxine      Serotonin syndrome    Diclofenac Sodium [Diclofenac]      Serotonin syndrome and restless legs syndrome    Gabapentin      Drove on the wrong side of the highway    Levofloxacin      \"CAN'T REMEMBER\"    Penicillins      \"SORES IN MOUTH\"    Riluzole Difficulty breathing and Swelling    Sulfa Antibiotics      \"PT DOES NOT KNOW WHAT THE REACTION WAS\"    Topiramate Other (See Comments)     Frequent urination       Weight:  196 lbs 6.4 oz / 89 kg          Indications for ECT:   Medications ineffective and Psychotherapies ineffective         Clinical Narrative:   HPI - The patient describes a lifelong history of depression dating back to elementary school, worsening after her father's death when she was 15yo and especially in the 1980s in the setting of worsening physical health (since falling and breaking her ankle), which led to the the initiation of fluoxetine, her first antidepressant.  Her history has been primarily characterized by low mood, with some ups and downs but no extended depression-free periods since then.  She has tried many different medications from different classes, but cannot recall any one being particularly helpful for her.  She has experienced past episodes of particularly irritable mood and concomitant decreased sleep need, although most of these have lasted 1-2 days and triggered by anger related to external stressors.  She has at least one episode of a more " "extended episode of elevated mood (and associated grandiosity, increased spending, flight of ideas, increased goal directed behaviors, pressured speech, and odd and embarrassing behavior), which occurred in the context of relapse on alcohol in 2021. She does not endorse any events before about age 48.     The patient's depression is characterized by near daily low mood, frequent anhedonia, with sleep difficulties (although c/b pain, KYAW, and RLS), fatigue, feelings of guilt/worthlessness, and impaired concentration.  She reports feelings of \"despair,\" at times with active SI with plan, but denies any intent to act on this - \"maybe it's hope.\"  She has 1 lifetime suicide attempt (overdose) in the 1980s, for which she was psychiatrically hospitalized.  She has a remote history of SIB (cutting) but not recently.       Psych pertinent item history includes includes suicide attempt , suicidal ideation, SIB , aggression, trauma hx, substance use: alcohol, cannabis, and Patient has a history of alcohol dependence treated 20+ years ago and she relapsed in 2020 for a couple of months, in her 20s she\" loved getting high\" on cocaine, LSD, mushrooms, speed, white cross, mutiple psychotropic trials , psych hosp, ketamine, and major medical problems.    Target Symptoms for Improvement: Amotivation, Fatigue, Improved self-image and Panic attacks         Diagnosis:   Major depression         Assessment:   #1 05/24/24 Some circumstantial thought, needs some redirection, 3.5 hours sleep last night, anxious, mood 1/10, PDW but no SI, never had ECT before - only TMS. No AVH.   #2 05/29/24  Mood 4/10, better over w/e, some word find difficulties, no AVH/SI/PDW. Chronic sciatica pain, neck and shoulder pain - didn't worsen with ECT. Mild headache.   #3 05/31/24  Mood 4/10, No SI, PDW, AVH, some wrist pain and headache, memory might be improving.    #4 6/3/24  Phq-9= 13, mood 3/10.  Yesterday was very tearful, still a bit today.  Last " "treatment had awareness under paralysis.  Otherwise, some initial confusion after first ECT but no subsequent cognitive effects.  Signed consent to continue.  #5 6/5/24 Mood 4-5/10  She feels like her \"rage\" is less and she feels lighter. but no cognitive side effects. She complains of excessive sweating and is concerned her thryoid is unbalanced. She is somatically focused She reports pain on the side of her neck radiating down to her chest. She had a migraine she thinks over the weekend which usual starts as occipital tension.  #6 6/7/24 mood 4-5/10. No SI. She does have some baseline long term memory difficulties no AVH.        Pause for the Cause:     Correct patient Yes   Correct procedure/laterality settings: Yes           Intra-Procedure Documentation:     ECT #: 6   Treatment number this series: 6   Total treatment number: 6     Type of ECT:  Right, unilateral ultrabrief    ECT Medications:    Toradol 30mg iv - for headache/myalgia     Brevital: 100 mg (incr from 90 mg as still awake)   Succinyl Choline: 90 mg    BP - nicardipine 1500 mcg         ECT Strip Summary: RUL Titration #3: 38.4 mC   Energy Level:  230.4 mC, 0.3 ms, 60 Hz, 8 sec, 800 mA     Motor Seizure Duration: 16 seconds  EEG Seizure Duration: 33 seconds    Complications: none  Plan:   - Continue RUL ECT - Q MWF    - Monitor depression severity with clinical assessment augmented with PHQ9 every other treatment  - Continue current medications    Discharge instructions:   - Continue acute ECT    - Per Dr Varela:   - Consider trial of serotonin modulator (e.g., vilazodone vs. vortioxetine) or novel agent Auvelity  - Consider augmentation with atypical antipsychotic (e.g., quetiapine or aripiprazole), though there are concerns given pre-diabetes      Toan Cotter MD  Dept of Psychiatry  "

## 2024-06-08 PROCEDURE — 250N000013 HC RX MED GY IP 250 OP 250 PS 637

## 2024-06-08 PROCEDURE — 124N000002 HC R&B MH UMMC

## 2024-06-08 PROCEDURE — 250N000013 HC RX MED GY IP 250 OP 250 PS 637: Performed by: PHYSICIAN ASSISTANT

## 2024-06-08 RX ADMIN — GUAIFENESIN 600 MG: 600 TABLET ORAL at 20:02

## 2024-06-08 RX ADMIN — ALLOPURINOL 300 MG: 300 TABLET ORAL at 20:02

## 2024-06-08 RX ADMIN — GABAPENTIN 400 MG: 400 CAPSULE ORAL at 21:18

## 2024-06-08 RX ADMIN — Medication 3 MG: at 23:17

## 2024-06-08 RX ADMIN — OXYCODONE HYDROCHLORIDE 5 MG: 5 TABLET ORAL at 23:11

## 2024-06-08 RX ADMIN — OXYCODONE HYDROCHLORIDE 5 MG: 5 TABLET ORAL at 00:22

## 2024-06-08 RX ADMIN — OXYCODONE HYDROCHLORIDE 5 MG: 5 TABLET ORAL at 11:49

## 2024-06-08 RX ADMIN — ROPINIROLE HYDROCHLORIDE 2 MG: 2 TABLET, FILM COATED ORAL at 20:02

## 2024-06-08 RX ADMIN — MAGNESIUM OXIDE TAB 400 MG (241.3 MG ELEMENTAL MG) 400 MG: 400 (241.3 MG) TAB at 08:12

## 2024-06-08 RX ADMIN — OXYCODONE HYDROCHLORIDE 5 MG: 5 TABLET ORAL at 06:33

## 2024-06-08 RX ADMIN — POTASSIUM CHLORIDE 20 MEQ: 1500 TABLET, EXTENDED RELEASE ORAL at 08:11

## 2024-06-08 RX ADMIN — Medication 1000 MG: at 08:11

## 2024-06-08 RX ADMIN — LIDOCAINE 1 PATCH: 4 PATCH TOPICAL at 11:14

## 2024-06-08 RX ADMIN — PANTOPRAZOLE SODIUM 40 MG: 40 TABLET, DELAYED RELEASE ORAL at 08:12

## 2024-06-08 RX ADMIN — UMECLIDINIUM 1 PUFF: 62.5 AEROSOL, POWDER ORAL at 08:50

## 2024-06-08 RX ADMIN — ACETAMINOPHEN 650 MG: 325 TABLET, FILM COATED ORAL at 07:50

## 2024-06-08 RX ADMIN — MAGNESIUM OXIDE TAB 400 MG (241.3 MG ELEMENTAL MG) 400 MG: 400 (241.3 MG) TAB at 20:02

## 2024-06-08 RX ADMIN — GUAIFENESIN 600 MG: 600 TABLET ORAL at 08:11

## 2024-06-08 RX ADMIN — Medication 500 MCG: at 08:11

## 2024-06-08 RX ADMIN — ROPINIROLE HYDROCHLORIDE 2 MG: 2 TABLET, FILM COATED ORAL at 23:11

## 2024-06-08 RX ADMIN — FLUTICASONE FUROATE AND VILANTEROL TRIFENATATE 1 PUFF: 100; 25 POWDER RESPIRATORY (INHALATION) at 08:50

## 2024-06-08 RX ADMIN — LEVOTHYROXINE SODIUM 150 MCG: 75 TABLET ORAL at 06:33

## 2024-06-08 RX ADMIN — Medication 3 MG: at 00:22

## 2024-06-08 RX ADMIN — OXYCODONE HYDROCHLORIDE 5 MG: 5 TABLET ORAL at 18:02

## 2024-06-08 RX ADMIN — Medication 12.5 MG: at 08:11

## 2024-06-08 RX ADMIN — ROPINIROLE HYDROCHLORIDE 2 MG: 2 TABLET, FILM COATED ORAL at 14:46

## 2024-06-08 ASSESSMENT — ACTIVITIES OF DAILY LIVING (ADL)
LAUNDRY: WITH SUPERVISION
ADLS_ACUITY_SCORE: 41
HYGIENE/GROOMING: INDEPENDENT
ADLS_ACUITY_SCORE: 41
ADLS_ACUITY_SCORE: 41
DRESS: SCRUBS (BEHAVIORAL HEALTH);INDEPENDENT
ADLS_ACUITY_SCORE: 41
ADLS_ACUITY_SCORE: 41
ORAL_HYGIENE: INDEPENDENT
ADLS_ACUITY_SCORE: 41

## 2024-06-08 NOTE — PLAN OF CARE
No PRNs given this shift. Pain controlled with  scheduled pain meds. Safety checks completed every 15 minutes; no safety concerns noted. Pt appears to have slept for  5.50 hours; will continue to monitor and offer support.    Problem: Sleep Disturbance  Goal: Adequate Sleep/Rest  Outcome: Progressing   Goal Outcome Evaluation:

## 2024-06-08 NOTE — PLAN OF CARE
"  Problem: Suicide Risk  Goal: Absence of Self-Harm  Outcome: Progressing  Intervention: Assess Risk to Self and Maintain Safety  Recent Flowsheet Documentation  Taken 6/7/2024 1830 by Alissa Arnold RN  Behavior Management: other (see comments)  Self-Harm Prevention: environmental self-harm risks assessed  Intervention: Establish Safety Plan and Continuity of Care  Recent Flowsheet Documentation  Taken 6/7/2024 1830 by Alissa Arnold RN  Safe Transition Promotion: protective factors promoted     Problem: Depression  Goal: Improved Mood  Outcome: Progressing   Goal Outcome Evaluation:    Pt slept most of the shift. She paced the [facility] intermitted stating \"I am upset with what l experienced during  ECT today\". Mood anxious and depressed. Affect flat and blunted. She was cooperative with mental health assessments. Complained of neck pain when she woke up from sleep..scheduled oxycodone administered. Per pt intervention was effective. Endorsed anxiety 8/10, depression 6/10. Denies CLIFTON/SIB/HI/AVH. Pt  had adequate oral intake and hydrated well. She was compliant with medication.  Hygiene appropriate.No safety concerns.  "

## 2024-06-08 NOTE — PLAN OF CARE
Patient visible in the milieu this shift. [facility] with [facility]. Patient denied SI/SIB/HI/AVH. Patient rated anxiety/depression 3/10. Patient was calm on approach. Gait appeared unsteady earlier. Patient showered. Spouse visited this shift. Patient endorsed migraine at the beginning of shift today, rated 6/10. Patient requested PRN tylenol, was effective. Lidocaine patch applied to buttocks area, requested for patch to be cut in half, was applied after showered. Patient was pleasant, compliant with medications. Patient is eating and drinking adequately. No behavioral issues observed or reported. Will continue to monitor and offer support.    Problem: Adult Behavioral Health Plan of Care  Goal: Plan of Care Review  Outcome: Progressing     Problem: Suicide Risk  Goal: Absence of Self-Harm  Outcome: Progressing     Problem: Depression  Goal: Improved Mood  Outcome: Progressing    Plan of Care Reviewed With: patient

## 2024-06-09 PROCEDURE — 250N000013 HC RX MED GY IP 250 OP 250 PS 637

## 2024-06-09 PROCEDURE — 250N000013 HC RX MED GY IP 250 OP 250 PS 637: Performed by: PHYSICIAN ASSISTANT

## 2024-06-09 PROCEDURE — 124N000002 HC R&B MH UMMC

## 2024-06-09 RX ADMIN — ROPINIROLE HYDROCHLORIDE 2 MG: 2 TABLET, FILM COATED ORAL at 16:39

## 2024-06-09 RX ADMIN — MAGNESIUM OXIDE TAB 400 MG (241.3 MG ELEMENTAL MG) 400 MG: 400 (241.3 MG) TAB at 07:42

## 2024-06-09 RX ADMIN — OXYCODONE HYDROCHLORIDE 5 MG: 5 TABLET ORAL at 17:26

## 2024-06-09 RX ADMIN — OXYCODONE HYDROCHLORIDE 5 MG: 5 TABLET ORAL at 06:07

## 2024-06-09 RX ADMIN — Medication 500 MCG: at 07:43

## 2024-06-09 RX ADMIN — Medication 1000 MG: at 07:42

## 2024-06-09 RX ADMIN — Medication 3 MG: at 23:06

## 2024-06-09 RX ADMIN — FLUTICASONE FUROATE AND VILANTEROL TRIFENATATE 1 PUFF: 100; 25 POWDER RESPIRATORY (INHALATION) at 07:43

## 2024-06-09 RX ADMIN — MAGNESIUM OXIDE TAB 400 MG (241.3 MG ELEMENTAL MG) 400 MG: 400 (241.3 MG) TAB at 19:58

## 2024-06-09 RX ADMIN — UMECLIDINIUM 1 PUFF: 62.5 AEROSOL, POWDER ORAL at 07:43

## 2024-06-09 RX ADMIN — PANTOPRAZOLE SODIUM 40 MG: 40 TABLET, DELAYED RELEASE ORAL at 07:43

## 2024-06-09 RX ADMIN — POLYETHYLENE GLYCOL 3350 17 G: 17 POWDER, FOR SOLUTION ORAL at 09:58

## 2024-06-09 RX ADMIN — LEVOTHYROXINE SODIUM 75 MCG: 75 TABLET ORAL at 06:06

## 2024-06-09 RX ADMIN — ACETAMINOPHEN 650 MG: 325 TABLET, FILM COATED ORAL at 08:07

## 2024-06-09 RX ADMIN — GUAIFENESIN 600 MG: 600 TABLET ORAL at 19:58

## 2024-06-09 RX ADMIN — OXYCODONE HYDROCHLORIDE 5 MG: 5 TABLET ORAL at 13:16

## 2024-06-09 RX ADMIN — Medication 12.5 MG: at 07:42

## 2024-06-09 RX ADMIN — ROPINIROLE HYDROCHLORIDE 2 MG: 2 TABLET, FILM COATED ORAL at 19:58

## 2024-06-09 RX ADMIN — ROPINIROLE HYDROCHLORIDE 2 MG: 2 TABLET, FILM COATED ORAL at 23:03

## 2024-06-09 RX ADMIN — GUAIFENESIN 600 MG: 600 TABLET ORAL at 07:42

## 2024-06-09 RX ADMIN — OXYCODONE HYDROCHLORIDE 5 MG: 5 TABLET ORAL at 23:04

## 2024-06-09 RX ADMIN — ALLOPURINOL 300 MG: 300 TABLET ORAL at 19:58

## 2024-06-09 RX ADMIN — POTASSIUM CHLORIDE 20 MEQ: 1500 TABLET, EXTENDED RELEASE ORAL at 07:42

## 2024-06-09 ASSESSMENT — ACTIVITIES OF DAILY LIVING (ADL)
ADLS_ACUITY_SCORE: 41
HYGIENE/GROOMING: INDEPENDENT
ADLS_ACUITY_SCORE: 41
DRESS: INDEPENDENT
ADLS_ACUITY_SCORE: 41
ORAL_HYGIENE: INDEPENDENT
ADLS_ACUITY_SCORE: 41

## 2024-06-09 NOTE — PLAN OF CARE
Problem: Psychotic Signs/Symptoms  Goal: Improved Behavioral Control (Psychotic Signs/Symptoms)  Outcome: Progressing   Goal Outcome Evaluation:  Pt was notably visible in the milieu alert and oriented to place, situation, date and time. Pt is able to make needs known. She's independent with her ADLs. She reported sleeping fairly ok, eating and drinking adequately. Was negative for all mental health symptoms...... positive for headache, neck, shoulder and bilateral hip pain otherwise treated with scheduled pain and prn medications. Pt also utilized transcutaneous electrical nerve stimulation (TENS). Ate 100% of her breakfast and lunch. No new concerns at this time.

## 2024-06-09 NOTE — PLAN OF CARE
Pt remained in her room the entire shift.  Safety checks completed every 15 minutes; no safety concerns noted. Pain well controlled with scheduled Oxycodone given as ordered. Pt appears to have slept for 5.75  hours; will continue to monitor and offer support    Problem: Sleep Disturbance  Goal: Adequate Sleep/Rest  Outcome: Progressing   Goal Outcome Evaluation:

## 2024-06-09 NOTE — PLAN OF CARE
Problem: Psychotic Signs/Symptoms  Goal: Improved Behavioral Control (Psychotic Signs/Symptoms)  Outcome: Progressing     Problem: Anxiety  Goal: Anxiety Reduction or Resolution  Outcome: Progressing  Intervention: Promote Anxiety Reduction  Recent Flowsheet Documentation  Taken 6/9/2024 1800 by Kaushik Monsalve RN  Supportive Measures: relaxation techniques promoted  Family/Support System Care:   presence promoted   involvement promoted   Goal Outcome Evaluation:    Plan of Care Reviewed With: patient      Patient was somewhat anxious this shift. Told writer she was worried about having a late ECT tomorrow. Stated she prefers ECTs in the mornings not afternoons. Endorsed an anxiety level of 5/10 and declined intervention. Writer nevertheless, provided  emotional support and encouraged patient to remain calm. Denied most psych symptoms and contracted for safety.Was alert and oriented x4, and maintained normal vitals through the shift. Appetite was good and patient hydrated well.Scheduled  pm gabapentin 400 mg held for ECT tomorrow and patient received the rest of her medications with no concerns.  No psychosis present. Will continue to monitor

## 2024-06-09 NOTE — PLAN OF CARE
Problem: Psychotic Signs/Symptoms  Goal: Improved Behavioral Control (Psychotic Signs/Symptoms)  Outcome: Progressing   Goal Outcome Evaluation:    Plan of Care Reviewed With: patient      Patient visible in the milieu most of the shift: Attended evening group sessions; Was respectful and polite when communicating with staff and peers; interacted and socialized appropriately with colleagues and staff; denied all psych symptoms, but endorsed moderate neck/back pain; gait was unsteady; patient was alert and oriented x4; ate and hydrated well; vitals were thin therapeutic range; no psychotic behavior observed; patient given scheduled oxycodone for neck/ back pain and was effective. Will continue to monitor.

## 2024-06-10 ENCOUNTER — ANESTHESIA EVENT (OUTPATIENT)
Dept: BEHAVIORAL HEALTH | Facility: CLINIC | Age: 69
DRG: 881 | End: 2024-06-10
Payer: MEDICARE

## 2024-06-10 ENCOUNTER — APPOINTMENT (OUTPATIENT)
Dept: BEHAVIORAL HEALTH | Facility: CLINIC | Age: 69
DRG: 881 | End: 2024-06-10
Payer: MEDICARE

## 2024-06-10 ENCOUNTER — ANESTHESIA (OUTPATIENT)
Dept: BEHAVIORAL HEALTH | Facility: CLINIC | Age: 69
DRG: 881 | End: 2024-06-10
Payer: MEDICARE

## 2024-06-10 PROCEDURE — 124N000002 HC R&B MH UMMC

## 2024-06-10 PROCEDURE — 99232 SBSQ HOSP IP/OBS MODERATE 35: CPT | Mod: 25 | Performed by: PSYCHIATRY & NEUROLOGY

## 2024-06-10 PROCEDURE — 250N000013 HC RX MED GY IP 250 OP 250 PS 637

## 2024-06-10 PROCEDURE — 250N000013 HC RX MED GY IP 250 OP 250 PS 637: Performed by: PHYSICIAN ASSISTANT

## 2024-06-10 PROCEDURE — 90870 ELECTROCONVULSIVE THERAPY: CPT

## 2024-06-10 PROCEDURE — 370N000017 HC ANESTHESIA TECHNICAL FEE, PER MIN

## 2024-06-10 PROCEDURE — 258N000003 HC RX IP 258 OP 636: Mod: JZ | Performed by: PSYCHIATRY & NEUROLOGY

## 2024-06-10 PROCEDURE — 250N000009 HC RX 250

## 2024-06-10 PROCEDURE — 250N000011 HC RX IP 250 OP 636

## 2024-06-10 PROCEDURE — 90870 ELECTROCONVULSIVE THERAPY: CPT | Performed by: PSYCHIATRY & NEUROLOGY

## 2024-06-10 RX ORDER — HYDROMORPHONE HCL IN WATER/PF 6 MG/30 ML
0.4 PATIENT CONTROLLED ANALGESIA SYRINGE INTRAVENOUS EVERY 5 MIN PRN
Status: CANCELLED | OUTPATIENT
Start: 2024-06-10

## 2024-06-10 RX ORDER — ONDANSETRON 4 MG/1
4 TABLET, ORALLY DISINTEGRATING ORAL EVERY 30 MIN PRN
Status: CANCELLED | OUTPATIENT
Start: 2024-06-10

## 2024-06-10 RX ORDER — ZOLPIDEM TARTRATE 5 MG/1
TABLET ORAL
Qty: 1 TABLET | Refills: 0 | Status: SHIPPED | OUTPATIENT
Start: 2024-06-10 | End: 2024-06-20

## 2024-06-10 RX ORDER — LABETALOL HYDROCHLORIDE 5 MG/ML
INJECTION, SOLUTION INTRAVENOUS PRN
Status: DISCONTINUED | OUTPATIENT
Start: 2024-06-10 | End: 2024-06-10

## 2024-06-10 RX ORDER — SODIUM CHLORIDE, SODIUM LACTATE, POTASSIUM CHLORIDE, CALCIUM CHLORIDE 600; 310; 30; 20 MG/100ML; MG/100ML; MG/100ML; MG/100ML
INJECTION, SOLUTION INTRAVENOUS CONTINUOUS
Status: CANCELLED | OUTPATIENT
Start: 2024-06-10

## 2024-06-10 RX ORDER — FENTANYL CITRATE 50 UG/ML
25 INJECTION, SOLUTION INTRAMUSCULAR; INTRAVENOUS EVERY 5 MIN PRN
Status: CANCELLED | OUTPATIENT
Start: 2024-06-10

## 2024-06-10 RX ORDER — NALOXONE HYDROCHLORIDE 0.4 MG/ML
0.1 INJECTION, SOLUTION INTRAMUSCULAR; INTRAVENOUS; SUBCUTANEOUS
Status: CANCELLED | OUTPATIENT
Start: 2024-06-10

## 2024-06-10 RX ORDER — ONDANSETRON 2 MG/ML
4 INJECTION INTRAMUSCULAR; INTRAVENOUS EVERY 30 MIN PRN
Status: CANCELLED | OUTPATIENT
Start: 2024-06-10

## 2024-06-10 RX ORDER — OXYCODONE HYDROCHLORIDE 5 MG/1
10 TABLET ORAL
Status: CANCELLED | OUTPATIENT
Start: 2024-06-10

## 2024-06-10 RX ORDER — HYDROMORPHONE HCL IN WATER/PF 6 MG/30 ML
0.2 PATIENT CONTROLLED ANALGESIA SYRINGE INTRAVENOUS EVERY 5 MIN PRN
Status: CANCELLED | OUTPATIENT
Start: 2024-06-10

## 2024-06-10 RX ORDER — METHOHEXITAL IN WATER/PF 100MG/10ML
SYRINGE (ML) INTRAVENOUS PRN
Status: DISCONTINUED | OUTPATIENT
Start: 2024-06-10 | End: 2024-06-10

## 2024-06-10 RX ORDER — FENTANYL CITRATE 50 UG/ML
50 INJECTION, SOLUTION INTRAMUSCULAR; INTRAVENOUS EVERY 5 MIN PRN
Status: CANCELLED | OUTPATIENT
Start: 2024-06-10

## 2024-06-10 RX ORDER — OXYCODONE HYDROCHLORIDE 5 MG/1
5 TABLET ORAL
Status: CANCELLED | OUTPATIENT
Start: 2024-06-10

## 2024-06-10 RX ORDER — NICARDIPINE HCL-0.9% SOD CHLOR 1 MG/10 ML
SYRINGE (ML) INTRAVENOUS PRN
Status: DISCONTINUED | OUTPATIENT
Start: 2024-06-10 | End: 2024-06-10

## 2024-06-10 RX ORDER — DEXAMETHASONE SODIUM PHOSPHATE 4 MG/ML
4 INJECTION, SOLUTION INTRA-ARTICULAR; INTRALESIONAL; INTRAMUSCULAR; INTRAVENOUS; SOFT TISSUE
Status: CANCELLED | OUTPATIENT
Start: 2024-06-10

## 2024-06-10 RX ADMIN — FLUTICASONE FUROATE AND VILANTEROL TRIFENATATE 1 PUFF: 100; 25 POWDER RESPIRATORY (INHALATION) at 08:44

## 2024-06-10 RX ADMIN — ROPINIROLE HYDROCHLORIDE 2 MG: 2 TABLET, FILM COATED ORAL at 14:58

## 2024-06-10 RX ADMIN — Medication 3 MG: at 22:59

## 2024-06-10 RX ADMIN — OXYCODONE HYDROCHLORIDE 5 MG: 5 TABLET ORAL at 22:58

## 2024-06-10 RX ADMIN — ROPINIROLE HYDROCHLORIDE 2 MG: 2 TABLET, FILM COATED ORAL at 22:59

## 2024-06-10 RX ADMIN — SUCCINYLCHOLINE CHLORIDE 90 MG: 20 INJECTION, SOLUTION INTRAMUSCULAR; INTRAVENOUS; PARENTERAL at 14:02

## 2024-06-10 RX ADMIN — POTASSIUM CHLORIDE 20 MEQ: 1500 TABLET, EXTENDED RELEASE ORAL at 14:58

## 2024-06-10 RX ADMIN — GUAIFENESIN 600 MG: 600 TABLET ORAL at 10:35

## 2024-06-10 RX ADMIN — ACETAMINOPHEN 650 MG: 325 TABLET, FILM COATED ORAL at 14:59

## 2024-06-10 RX ADMIN — OXYCODONE HYDROCHLORIDE 5 MG: 5 TABLET ORAL at 17:18

## 2024-06-10 RX ADMIN — ACETAMINOPHEN 650 MG: 325 TABLET, FILM COATED ORAL at 08:44

## 2024-06-10 RX ADMIN — MAGNESIUM OXIDE TAB 400 MG (241.3 MG ELEMENTAL MG) 400 MG: 400 (241.3 MG) TAB at 20:09

## 2024-06-10 RX ADMIN — Medication 500 MCG: at 14:58

## 2024-06-10 RX ADMIN — OXYCODONE HYDROCHLORIDE 5 MG: 5 TABLET ORAL at 06:18

## 2024-06-10 RX ADMIN — SODIUM CHLORIDE, POTASSIUM CHLORIDE, SODIUM LACTATE AND CALCIUM CHLORIDE 500 ML: 600; 310; 30; 20 INJECTION, SOLUTION INTRAVENOUS at 14:01

## 2024-06-10 RX ADMIN — LEVOTHYROXINE SODIUM 150 MCG: 75 TABLET ORAL at 06:19

## 2024-06-10 RX ADMIN — Medication 12.5 MG: at 08:44

## 2024-06-10 RX ADMIN — UMECLIDINIUM 1 PUFF: 62.5 AEROSOL, POWDER ORAL at 08:44

## 2024-06-10 RX ADMIN — ALLOPURINOL 300 MG: 300 TABLET ORAL at 20:09

## 2024-06-10 RX ADMIN — OXYCODONE HYDROCHLORIDE 5 MG: 5 TABLET ORAL at 11:47

## 2024-06-10 RX ADMIN — ACETAMINOPHEN 650 MG: 325 TABLET, FILM COATED ORAL at 20:23

## 2024-06-10 RX ADMIN — GABAPENTIN 400 MG: 400 CAPSULE ORAL at 22:58

## 2024-06-10 RX ADMIN — LABETALOL HYDROCHLORIDE 20 MG: 5 INJECTION, SOLUTION INTRAVENOUS at 14:02

## 2024-06-10 RX ADMIN — GUAIFENESIN 600 MG: 600 TABLET ORAL at 20:09

## 2024-06-10 RX ADMIN — MAGNESIUM OXIDE TAB 400 MG (241.3 MG ELEMENTAL MG) 400 MG: 400 (241.3 MG) TAB at 14:58

## 2024-06-10 RX ADMIN — ROPINIROLE HYDROCHLORIDE 2 MG: 2 TABLET, FILM COATED ORAL at 20:09

## 2024-06-10 RX ADMIN — Medication 500 MCG: at 14:02

## 2024-06-10 RX ADMIN — Medication 100 MG: at 14:09

## 2024-06-10 RX ADMIN — Medication 1000 MG: at 14:58

## 2024-06-10 RX ADMIN — PANTOPRAZOLE SODIUM 40 MG: 40 TABLET, DELAYED RELEASE ORAL at 08:44

## 2024-06-10 ASSESSMENT — ACTIVITIES OF DAILY LIVING (ADL)
ADLS_ACUITY_SCORE: 41
ADLS_ACUITY_SCORE: 41
HYGIENE/GROOMING: INDEPENDENT;SHOWER
DRESS: INDEPENDENT
ADLS_ACUITY_SCORE: 41
ORAL_HYGIENE: INDEPENDENT
HYGIENE/GROOMING: INDEPENDENT
ADLS_ACUITY_SCORE: 41
ORAL_HYGIENE: INDEPENDENT
ADLS_ACUITY_SCORE: 41
DRESS: INDEPENDENT;STREET CLOTHES
ADLS_ACUITY_SCORE: 41

## 2024-06-10 ASSESSMENT — COPD QUESTIONNAIRES
COPD: 1
CAT_SEVERITY: MILD

## 2024-06-10 NOTE — PROGRESS NOTES
"  ----------------------------------------------------------------------------------------------------------  [facility]  Psychiatry Progress Note  Hospital Day #20     Subjective:     Patient Interview: Winnie ball reports that she feels good today. She says that she felt teary after her ECT session on Friday but had good moods over the weekend. She had several visitors, one of which remarked to her that she seemed more cheerful as of late, which made Winnie feel good. She was a little disappointed that she didn't get to do ECT in the morning today as she prefers it earlier in the day, but otherwise she feels good today.     Objective:     Vitals:  /87   Pulse 87   Temp 97.5  F (36.4  C) (Temporal)   Resp 16   Ht 1.676 m (5' 6\")   Wt 87.7 kg (193 lb 6.4 oz)   LMP  (LMP Unknown)   SpO2 95%   BMI 31.22 kg/m      Mental Status Exam:  Oriented to:  Grossly Oriented  General:  Awake and Alert  Appearance:  appears stated age and Grooming is adequate  Behavior/Attitude:  cooperative and open conversational  Eye Contact:  appropriate  Psychomotor: normal and no evidence of tics, dystonia, or tardive dyskinesia no catatonia present  Speech:  appropriate volume/tone and talkative  Language: Fluent in English with appropriate syntax and vocabulary.  Mood:  \"better\"  Affect: congruent with mood, smiling  Thought Process:  coherent, goal directed, and circumstantial  Thought Content:  No HI, No VH, and No AH, no SI; No apparent delusions  Associations:  intact  Insight:  fair due to recognizing the circumstances of her mental health  Judgment:  fair due to willingness to engage in ECT  Impulse control: good  Attention Span:  grossly intact  Concentration:  grossly intact  Recent and Remote Memory:  not formally assessed  Fund of Knowledge:  average  Muscle Strength and Tone: normal  Gait and Station: Normal     Psychiatric Assessment     Randi Cleary is a 68 year old " female previously diagnosed with MDD, recurrent severe, substance induced mood disorder, Unspecified Trauma, CHAVO, borderline personality disorder and alcohol use disorder who presented voluntarily with SI in the context of treatment resistant MDD. On admission she presented with significant symptoms of depression incuding emotional lability, low mood, hopelessness, amotivation, anxiety, anhedonia, low energy, insomnia low appetite, excess guilt and poor concentration. Her affect was dysphroic and anxious at times with heavy perseveration on medico-surgical dignosises and passive SI causing incresed anxiety. She notes she has had a plan but no intent. Her history of extensive substance use, medical history and chronic pain contribute biologically. Her presentation is further complicated by borderline personality disorder and history of trauma. Additionally, she has a very limited support system. However her last hospitalization was in the 1980's for a suicide attempt via prozac overdose and has been engaged in treatment and present voluntarily. Her presentation is consistent with decompensated Major Depressive disorder, recurrent severe. Her disorder has been treatment resistant including to TMS and at this time, ECT would be the best course of action to address her symptom severity. Patient warrants inpatient psychiatric hospitalization to maintain her safety.      Psychiatric Plan by Diagnosis      Today's changes:  - ECT #7 this afternoon  - Plan for [facility] assessment tomorrow      # MDD, recurrent, severe, with anxious distress  - Patient suffers from treatment resistant depression and has trialed many medications. At this point treatment team will need to do a chart review to review medication trials and determine the best medication to address depressive symptoms long term  - Pt started ECT 5/24 : Hold high-dose gabapentin/pregabalin after 6pm on the day before ECT (to permit adequate seizure)   - Continue ECT  MWF     #Insomnia  - Zolpidem 5mg qpm prn    Pertinent Labs/Monitoring:   - EKG 5/22 - Qtc 458 NSR, RBBB     Additional Plans:  - Patient will be treated in therapeutic milieu with appropriate individual and group therapies as described     Psychiatric Hospital Course:      Randi Cleary was admitted to [facility] as a voluntary patient for electroconvulsive therapy for lifelong history of depression. Multiple medication trials were not effective. Symptoms notable for low mood, anhedonia, sleep difficulties, guilt and worthlessness, suicidal ideation with plan without intent.   Today is her 7th ECT session, and this treatment has been effective in managing her symptoms this admission.     The risks, benefits, alternatives, and side effects were discussed and understood by the patient.     Medical Assessment and Plan     Medical diagnoses to be addressed this admission:    # Hypothyroidism  - PTA 150mcg M-Sa, 75mcg on Perez     # KYAW   - Doesn't use CPAP at home, sleep study on 06/2024      # RLS  - Continue PTA Ropinirole 2 mg PO TID  - Gabapentin 400mg qpm  - Magnesium Citrated changed to Magnesium Oxide 400mg BID (per patient and medicine)    #RBBB:  Previously on other EKGs    #Cervical radiculopathy and myelopathy s/p cervical laminoplasty 12/2021    # Chronic Pain  - Continue PTA Roxicodone 5 mg q6h + 5 mg PRN (for max daily dose of 25 mg)  - Menthol 2.5% topical gel q6hrs prn  - TENS Unit     #Dizziness  #Headache  Reports hx of migraines, no vision changes or hearing changes. notes this has been ongoing since she had her spinal surgery.   - CT head 5/22 unremarkable    #Gout  - Nutrition consult  - Allopurinol 300mg qpm  - Flares in left podagra but none currently    #Vitamin B12 Deficiency  - Vit B12 500mcg qday    #Vitamin C Deficiency  - Ascorbic Acid 1000mg    #Vitamin D Deficiency  - 250mcg weekly    #COPD  - Breo Ellipta 1 puff daily   - Incruse Ellipta 1 puff daily  - Tessalon cap 200mg TID prn  -  Albuterol 2 puffs q6hr prn  - Mucinex 1200mg BID prn     #GERD  #Hiatal Hernia  - Protonix 40mg daily    #Possible pulmonary HTN  #HTN  Per Medicine note, pt follows with Cardiology here and has scheduled right heart catheterization for 06/12/2024. Has follow up with Cardiology in clinic scheduled for 06/19/2024. PTA edecrin 12.5mg daily and potassium supplement. Electrolytes and renal function stable.Recently lost 50lbs and BP has improved.   - Ethacrynic Acid 12.5mg qday   -Hold Edecrin on date of ECT, continue 12.5mg daily for now  -Continue potassium supplement   - Klorcon 20meq daily     #Hemochromatosis, hereditary  - Hgb Stable at ~17    #Hepatic Steatosis  - Stable  - LFTs wnl, no abdominal pain, distention, N/V    #History of breast cancer s/p mastectomy, chemo and XRT:   - currently in remission      #Alcohol use disorder, in remission:   - No current use. Two years sober     #Hx of pheochromocytoma s/p left adrenalectomy 08/2017:   - Stable  - Follows Endocrinology for this.     #Psoriasis:  - Noted on R hang  - Uses scalp brush    Medical course: Patient was physically examined by the ED prior to being transferred to the unit and was found to be medically stable and appropriate for admission. Medicine consult was done to provide clearance for ECT. Due to patient's persistent neck pain after surgery in 2021, CT Head was done which was negative. Oxycodone changed from q6hr prn to scheduled with an extra 5mg in the day as needed per PTA regimen. Patient continued to express some nerve pain so gabapentin was increased to 400mg.     Medical course: Patient was physically examined by the ED prior to being transferred to the unit and was found to be medically stable and appropriate for admission.     Consults: Dietician, Medicine for ECT Clearance, ECT Consult, Nutrition     Checklist     Legal Status: Voluntary     Safety Assessment:   Behavioral Orders   Procedures    Code 1 - Restrict to Unit    Code 2 - 1:1  Staff Supervision     For coming down to ECT only    Discontinue 1:1 attendant for suicide risk     Order Specific Question:   I have performed an in person assessment of the patient     Answer:   Based on this assessment the patient no longer requires a one on one attendant at this point in time.     Order Specific Question:   Rationale     Answer:   Patient States able to remain safe in hospital     Order Specific Question:   Rationale     Answer:   Modifications to care environment made to mitigate safety risk     Order Specific Question:   Rationale     Answer:   Routine observations are sufficient to monitor safety.    Electroconvulsive therapy     Series of up to 12 treatments. Begin Date: 5/24/24     Treating Psychiatrist providing ECT:  Ruthann     Notified on:  5/21/24    Electroconvulsive therapy     Series of up to 12 treatments. Begin Date: 5/24/24     Treating Psychiatrist providing ECT:  Ruthann     Notified on:  5/21/24    Fall precautions    Routine Programming     As clinically indicated    Status 15     Every 15 minutes.    Suicide precautions: Suicide Risk: MODERATE; Clinical rationale to override score: lack of access to a plan for self-harm     Order Specific Question:   Suicide Risk     Answer:   MODERATE     Order Specific Question:   Clinical rationale to override score:     Answer:   lack of access to a plan for self-harm       Risk Assessment:  Risk for harm is moderate-high.  Risk factors: SI, maladaptive coping, trauma, and past behaviors  Protective factors: engaged in treatment     SIO: Discontinued    Disposition: Pending stabilization, medication optimization, & development of a safe discharge plan. Pt has been accepted to [address] [place] [address] program once stable.      Attestations      Sung Myers, MS3     Resident Attestation:   I was present with the medical student who participated in the service and in the documentation of the note.  I have verified the history and personally performed the  physical exam, mental status exam, and medical decision making. I agree with the assessment and plan of care as documented in the note.     Yennifer Martinez MD  Psychiatry Resident Physician    Attestation:  This patient has been seen and evaluated by me, Jairo Choudhary MD.  I have discussed this patient with the house staff team including the resident and medical student and I agree with the findings and plan in this note.    I have reviewed today's vital signs, medications, labs and imaging. Jairo Choudahry MD , PhD.

## 2024-06-10 NOTE — PLAN OF CARE
"Pt denies SI. When asked if he is depressed pt stated she does not want to go there. Pt's affect has been bright. Pt social with peers ans staff.   Pt is currently down in ECT.  Asked pt if she thought ECT is helpful pt did not really answer.  Pt disapointed that ECT is late in the day today stating that she's hungry.  Pt showered today put on clean clothes.    Problem: Adult Inpatient Plan of Care  Goal: Plan of Care Review  Description: The Plan of Care Review/Shift note should be completed every shift.  The Outcome Evaluation is a brief statement about your assessment that the patient is improving, declining, or no change.  This information will be displayed automatically on your shift  note.  Outcome: Not Progressing  Goal: Patient-Specific Goal (Individualized)  Description: You can add care plan individualizations to a care plan. Examples of Individualization might be:  \"Parent requests to be called daily at 9am for status\", \"I have a hard time hearing out of my right ear\", or \"Do not touch me to wake me up as it startles  me\".  Outcome: Not Progressing  Goal: Absence of Hospital-Acquired Illness or Injury  Outcome: Not Progressing  Goal: Optimal Comfort and Wellbeing  Outcome: Not Progressing  Intervention: Monitor Pain and Promote Comfort  Recent Flowsheet Documentation  Taken 6/10/2024 1147 by Winsome Burns RN  Pain Management Interventions: medication (see MAR)  Taken 6/10/2024 0700 by Winsome Burns, RN  Pain Management Interventions: medication (see MAR)  Goal: Readiness for Transition of Care  Outcome: Not Progressing   Goal Outcome Evaluation:                        "

## 2024-06-10 NOTE — PLAN OF CARE
BEH IP Unit Acuity Rating Score (UARS)  Patient is given one point for every criteria they meet.     CRITERIA SCORING   On a 72 hour hold, court hold, committed, stay of commitment, or revocation. 0    Patient LOS on BEH unit exceeds 20 days. 0  LOS: 20   Patient under guardianship, 55+, otherwise medically complex, or under age 11. 1   Suicide ideation without relief of precipitating factors. 0   Current plan for suicide. 0   Current plan for homicide. 0   Imminent risk or actual attempt to seriously harm another without relief of factors precipitating the attempt. 0   Severe dysfunction in daily living (ex: complete neglect for self care, extreme disruption in vegetative function, extreme deterioration in social interactions). 1   Recent (last 7 days) or current physical aggression in the ED or on unit. 0   Restraints or seclusion episode in past 72 hours. 0   Recent (last 7 days) or current verbal aggression, agitation, yelling, etc., while in the ED or unit. 0   Active psychosis. 0   Need for constant or near constant redirection (from leaving, from others, etc).  0   Intrusive or disruptive behaviors. 0   Patient requires 3 or more hours of individualized nursing care per 8-hour shift (i.e. for ADLs, meds, therapeutic interventions). 0   TOTAL 2

## 2024-06-10 NOTE — PROGRESS NOTES
Pt returned to [facility] via ECT.  Pt alert and oriented x3.  Pt reported H/A rated pain at a 6 rec'd PRN Tylenol. Pt denies nausea.  Pt currently eating supper.  Pt smiling talking to staff.
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
## 2024-06-10 NOTE — PLAN OF CARE
Problem: Sleep Disturbance  Goal: Adequate Sleep/Rest  Outcome: Progressing   Goal Outcome Evaluation:  Patient had an uneventful night. Was observed to have slept for 6.25 hours. Pain adequately managed with scheduled Oxycodone. No requests for prn's..  NPO  in anticipation for ECT today at 120 PM. Safety checks in place, no concerns noted.

## 2024-06-10 NOTE — ANESTHESIA POSTPROCEDURE EVALUATION
Patient: Randi Cleary    Procedure: * No procedures listed *       Anesthesia Type:  General    Note:  Disposition: Outpatient   Postop Pain Control: Uneventful            Sign Out: Well controlled pain   PONV: No   Neuro/Psych:             Sign Out: Acceptable/Baseline neuro status   Airway/Respiratory: Uneventful            Sign Out: Acceptable/Baseline resp. status   CV/Hemodynamics: Uneventful            Sign Out: Acceptable CV status; No obvious hypovolemia; No obvious fluid overload   Other NRE: NONE   DID A NON-ROUTINE EVENT OCCUR? No       Last vitals:  Vitals:    06/09/24 0700 06/09/24 1601 06/10/24 0700   BP: 106/69 128/87    Pulse: 94 87    Resp:  16 16   Temp: 36.6  C (97.9  F) 36.4  C (97.5  F) 36.4  C (97.6  F)   SpO2: 94% 95% 98%       Electronically Signed By: Camilo Stroud MD  Jenny 10, 2024  2:13 PM

## 2024-06-10 NOTE — OR NURSING
1410 admit to pacu meets phase ll criteria in phase ll, alert to name, , where she is but not the actual date.  Report to RN on station 20, ready for discharge.

## 2024-06-10 NOTE — ANESTHESIA CARE TRANSFER NOTE
Patient: Randi Cleary    Procedure: * No procedures listed *       Diagnosis: * No pre-op diagnosis entered *  Diagnosis Additional Information: No value filed.    Anesthesia Type:   General     Note:    Oropharynx: oropharynx clear of all foreign objects  Level of Consciousness: drowsy  Oxygen Supplementation: face mask  Level of Supplemental Oxygen (L/min / FiO2): 6  Independent Airway: airway patency satisfactory and stable  Dentition: dentition unchanged  Vital Signs Stable: post-procedure vital signs reviewed and stable  Report to RN Given: handoff report given  Patient transferred to: PACU    Handoff Report: Identifed the Patient, Identified the Reponsible Provider, Reviewed the pertinent medical history, Discussed the surgical course, Reviewed Intra-OP anesthesia mangement and issues during anesthesia, Set expectations for post-procedure period and Allowed opportunity for questions and acknowledgement of understanding      Vitals:  Vitals Value Taken Time   BP     Temp     Pulse     Resp     SpO2         Electronically Signed By: Camilo Stroud MD  Jenny 10, 2024  2:10 PM

## 2024-06-10 NOTE — PLAN OF CARE
"       Assessment/Intervention/Current Symtoms and Care Coordination:  Chart reviewed and patient met with team,   Discussed patient progress, symptomology, and response to treatment.  Discussed the discharge plan and any potential impediments to discharge.     Patient continues course of ECT without complication.  ECT #7 scheduled for today.  Mood has appeared improved.  Affect has remained brighter. Patient continues to exhibit anxiety/worries.  Patient has continued to actively participate in groups, has been social with peers.  On 6/6 Writer discussed the possibility of completing ECT series as an outpatient. Patient stated she doesn't feel comfortable doing this as an outpatient at this point.\"Maybe the last 2\"  Unsure if  is able to care for her.        Discharge Plan or Goal:  Patient will return home when stable  Psychiatry  Therapy  [facility]     Barriers to Discharge:  Severity of depression/inability to function  Initiation of ECT- unable to do ECT as outpatient     Referral Status:  [facility] IOP -intake completed 6/6  Therapist- referral in process     Legal Status:  Voluntary     Contacts:  Outpatient Psychiatrist: Jeannette Adams  Psychiatry  Therapy: Arsenio Wylie  Psychiatry- SLP  Primary Physician: Laith Constantino  Family Members: Justin Cleary (Spouse) [phone]     Upcoming Meetings and Dates/Important Information and next steps:  Team update:  Wed        "

## 2024-06-11 ENCOUNTER — TELEPHONE (OUTPATIENT)
Dept: BEHAVIORAL HEALTH | Facility: CLINIC | Age: 69
End: 2024-06-11
Payer: MEDICARE

## 2024-06-11 ENCOUNTER — TELEPHONE (OUTPATIENT)
Dept: CARDIOLOGY | Facility: CLINIC | Age: 69
End: 2024-06-11
Payer: MEDICARE

## 2024-06-11 PROCEDURE — 99232 SBSQ HOSP IP/OBS MODERATE 35: CPT | Mod: GC | Performed by: PSYCHIATRY & NEUROLOGY

## 2024-06-11 PROCEDURE — 250N000013 HC RX MED GY IP 250 OP 250 PS 637

## 2024-06-11 PROCEDURE — H2032 ACTIVITY THERAPY, PER 15 MIN: HCPCS

## 2024-06-11 PROCEDURE — G0177 OPPS/PHP; TRAIN & EDUC SERV: HCPCS

## 2024-06-11 PROCEDURE — 124N000002 HC R&B MH UMMC

## 2024-06-11 PROCEDURE — 250N000013 HC RX MED GY IP 250 OP 250 PS 637: Performed by: PHYSICIAN ASSISTANT

## 2024-06-11 PROCEDURE — 250N000013 HC RX MED GY IP 250 OP 250 PS 637: Performed by: PSYCHIATRY & NEUROLOGY

## 2024-06-11 RX ADMIN — Medication 3 MG: at 22:43

## 2024-06-11 RX ADMIN — Medication 1000 MG: at 08:50

## 2024-06-11 RX ADMIN — POTASSIUM CHLORIDE 20 MEQ: 1500 TABLET, EXTENDED RELEASE ORAL at 08:50

## 2024-06-11 RX ADMIN — Medication 500 MCG: at 08:50

## 2024-06-11 RX ADMIN — ROPINIROLE HYDROCHLORIDE 2 MG: 2 TABLET, FILM COATED ORAL at 22:38

## 2024-06-11 RX ADMIN — Medication 12.5 MG: at 08:50

## 2024-06-11 RX ADMIN — ROPINIROLE HYDROCHLORIDE 2 MG: 2 TABLET, FILM COATED ORAL at 14:17

## 2024-06-11 RX ADMIN — IBUPROFEN 400 MG: 400 TABLET, FILM COATED ORAL at 14:17

## 2024-06-11 RX ADMIN — PANTOPRAZOLE SODIUM 40 MG: 40 TABLET, DELAYED RELEASE ORAL at 08:50

## 2024-06-11 RX ADMIN — ACETAMINOPHEN 650 MG: 325 TABLET, FILM COATED ORAL at 09:00

## 2024-06-11 RX ADMIN — GUAIFENESIN 600 MG: 600 TABLET ORAL at 08:50

## 2024-06-11 RX ADMIN — OXYCODONE HYDROCHLORIDE 5 MG: 5 TABLET ORAL at 18:38

## 2024-06-11 RX ADMIN — OXYCODONE HYDROCHLORIDE 5 MG: 5 TABLET ORAL at 23:46

## 2024-06-11 RX ADMIN — OXYCODONE HYDROCHLORIDE 5 MG: 5 TABLET ORAL at 00:54

## 2024-06-11 RX ADMIN — LEVOTHYROXINE SODIUM 150 MCG: 75 TABLET ORAL at 06:59

## 2024-06-11 RX ADMIN — MAGNESIUM OXIDE TAB 400 MG (241.3 MG ELEMENTAL MG) 400 MG: 400 (241.3 MG) TAB at 08:50

## 2024-06-11 RX ADMIN — OXYCODONE HYDROCHLORIDE 5 MG: 5 TABLET ORAL at 06:59

## 2024-06-11 RX ADMIN — UMECLIDINIUM 1 PUFF: 62.5 AEROSOL, POWDER ORAL at 08:51

## 2024-06-11 RX ADMIN — ALLOPURINOL 300 MG: 300 TABLET ORAL at 19:03

## 2024-06-11 RX ADMIN — OXYCODONE HYDROCHLORIDE 5 MG: 5 TABLET ORAL at 12:35

## 2024-06-11 RX ADMIN — FLUTICASONE FUROATE AND VILANTEROL TRIFENATATE 1 PUFF: 100; 25 POWDER RESPIRATORY (INHALATION) at 08:51

## 2024-06-11 RX ADMIN — MAGNESIUM OXIDE TAB 400 MG (241.3 MG ELEMENTAL MG) 400 MG: 400 (241.3 MG) TAB at 19:04

## 2024-06-11 RX ADMIN — GUAIFENESIN 600 MG: 600 TABLET ORAL at 19:03

## 2024-06-11 RX ADMIN — ROPINIROLE HYDROCHLORIDE 2 MG: 2 TABLET, FILM COATED ORAL at 19:03

## 2024-06-11 ASSESSMENT — ACTIVITIES OF DAILY LIVING (ADL)
ADLS_ACUITY_SCORE: 41
DRESS: INDEPENDENT
ADLS_ACUITY_SCORE: 41
DRESS: INDEPENDENT
HYGIENE/GROOMING: INDEPENDENT
ADLS_ACUITY_SCORE: 41
HYGIENE/GROOMING: INDEPENDENT
ADLS_ACUITY_SCORE: 41
ADLS_ACUITY_SCORE: 41
LAUNDRY: WITH SUPERVISION
ADLS_ACUITY_SCORE: 41
ORAL_HYGIENE: INDEPENDENT
ORAL_HYGIENE: INDEPENDENT
ADLS_ACUITY_SCORE: 41

## 2024-06-11 NOTE — PLAN OF CARE
"Pt is up early on the shift. She presented with a bright affect, She interacts and engages with peers by the [facility] area. Interactions appropriate. Pt nutrition and hydration is adequate. Hygiene is adequate. Pt said that her mind is well, but physically, her body is in pain. Complained of pain  at 7/10,  PRN administered. Her  came over to pay some bills with her. No behavioral concerns this shift.       PRNs: Tylenol    Problem: Adult Behavioral Health Plan of Care  Goal: Patient-Specific Goal (Individualization)  Description: You can add care plan individualizations to a care plan. Examples of Individualization might be:  \"Parent requests to be called daily at 9am for status\", \"I have a hard time hearing out of my right ear\", or \"Do not touch me to wake me up as it startles  me\".  Outcome: Progressing  Goal: Adheres to Safety Considerations for Self and Others  Outcome: Progressing  Intervention: Develop and Maintain Individualized Safety Plan  Recent Flowsheet Documentation  Taken 6/11/2024 1100 by Yoselin Garcia RN  Safety Measures:   environmental rounds completed   safety rounds completed  Goal: Absence of New-Onset Illness or Injury  Outcome: Progressing  Intervention: Identify and Manage Fall Risk  Recent Flowsheet Documentation  Taken 6/11/2024 1100 by Yoselin Garcia RN  Safety Measures:   environmental rounds completed   safety rounds completed  Goal: Optimized Coping Skills in Response to Life Stressors  Outcome: Progressing  Intervention: Promote Effective Coping Strategies  Recent Flowsheet Documentation  Taken 6/11/2024 1046 by Yoselin Garcia RN  Supportive Measures:   goal-setting facilitated   journaling promoted     Problem: Suicide Risk  Goal: Absence of Self-Harm  Outcome: Progressing  Intervention: Assess Risk to Self and Maintain Safety  Recent Flowsheet Documentation  Taken 6/11/2024 1046 by Yoselin Garcia RN  Self-Harm Prevention: " environmental self-harm risks assessed  Intervention: Promote Psychosocial Wellbeing  Recent Flowsheet Documentation  Taken 6/11/2024 1046 by Yoselin Garcia RN  Supportive Measures:   goal-setting facilitated   journaling promoted  Family/Support System Care:   involvement promoted   presence promoted   self-care encouraged   support provided  Intervention: Establish Safety Plan and Continuity of Care  Recent Flowsheet Documentation  Taken 6/11/2024 1046 by Yoselin Garcia RN  Safe Transition Promotion: protective factors promoted     Problem: Depression  Goal: Improved Mood  Outcome: Progressing  Intervention: Monitor and Manage Depressive Symptoms  Recent Flowsheet Documentation  Taken 6/11/2024 1046 by Yoselin Garcia RN  Supportive Measures:   goal-setting facilitated   journaling promoted  Family/Support System Care:   involvement promoted   presence promoted   self-care encouraged   support provided     Problem: Suicidal Behavior  Goal: Suicidal Behavior is Absent or Managed  Outcome: Progressing  Intervention: Provide Immediate and Ongoing Protective Physical Environment  Recent Flowsheet Documentation  Taken 6/11/2024 1046 by Yoselin Garcia RN  Safe Transition Promotion: protective factors promoted     Problem: Sleep Disturbance  Goal: Adequate Sleep/Rest  Outcome: Progressing     Problem: Fall Injury Risk  Goal: Absence of Fall and Fall-Related Injury  Outcome: Progressing     Problem: Anxiety  Goal: Anxiety Reduction or Resolution  Outcome: Progressing  Intervention: Promote Anxiety Reduction  Recent Flowsheet Documentation  Taken 6/11/2024 1046 by Yoselin Garcia RN  Supportive Measures:   goal-setting facilitated   journaling promoted  Family/Support System Care:   involvement promoted   presence promoted   self-care encouraged   support provided   Goal Outcome Evaluation:    Plan of Care Reviewed With: patient

## 2024-06-11 NOTE — TELEPHONE ENCOUNTER
Writer and patient discussed programming, patient is still inpatient at this time and will call once she is discharged. Patient reported she is still interested in the afternoon track.     RAYNA Cuevas on 6/11/2024 at 10:57 AM

## 2024-06-11 NOTE — PLAN OF CARE
Problem: Sleep Disturbance  Goal: Adequate Sleep/Rest  Outcome: Progressing   Goal Outcome Evaluation:  Patient had no new concerns during the night. Awake at the beginning of the shift, requested and received prn Oxycodone for pain- patient asleep during follow up checks. Patient was observed to have slept for 6,25 hours. Safety checks in place.

## 2024-06-11 NOTE — CARE PLAN
Rehab Group    Start time: 1315  End time: 1405  Patient time total: 50 minutes    attended full group    #5 attended   Group Type: occupational therapy   Group Topic Covered: balanced lifestyle, cognitive activities, coping skills, healthy leisure time, self-esteem, and social skills       Group Session Detail:  Education and group discussion provided on the importance of healthy leisure and the relationship between boredom and relapse with hands on Garbage card game for concentration, new learning, attention to detail, formulating soft strategy, sequencing, scanning, tracking, coping, mood stabilization, reality-based activity, follow through, frustration tolerance, and socialization.       Patient Response/Contribution:  actively engaged       Patient Detail:  Pt was pleasant and eager to learn a new card game.  She grossly understood the activity after a couple of rounds, though would often make minor errors and need reminders of specific rules.  Pt needed a reminder every round about how many cards to place on her mat.  She was upbeat and liked to joke (appropriately) with peers. Recommend continuing to monitor for possible need for cognitive evaluation.                Patient Active Problem List   Diagnosis    DJD (degenerative joint disease), ankle and foot    Hereditary hemochromatosis (H24)    Pulmonary hypertension (H)    Postablative hypothyroidism    Hx of corticosteroid therapy    Class 2 obesity due to excess calories without serious comorbidity in adult    Prediabetes    KYAW (obstructive sleep apnea)    Type 2 diabetes mellitus without complication, without long-term current use of insulin (H)    Hypokalemia    COPD (chronic obstructive pulmonary disease) (H)    Generalized anxiety disorder    Restless leg syndrome    Vitamin B12 deficiency    Vitamin D deficiency    Morbid obesity (H)    Cord compression (H)    History of cervical spinal surgery    Cervical stenosis of spinal canal    Alcohol use  disorder, moderate, in sustained remission (H)    Essential hypertension    Psoriatic arthropathy (H)    Primary osteoarthritis involving multiple joints    Gastroesophageal reflux disease with esophagitis without hemorrhage    Numbness in both hands    Chronic, continuous use of opioids    Trauma and stressor-related disorder    Post-menopausal    Suicidal ideation    Depression with anxiety    Severe recurrent major depression without psychotic features (H)

## 2024-06-11 NOTE — PLAN OF CARE
"Pt denies SI.  Affect bright.  Pt very social with peers. Pt agreed that she is doing well and her mood is good.  Pt playing cards with peers.  Pt went to evening group.  Pt reported H/A rec'd scheduled Oxycodone and PRN Tylenol. Pt appears eating well. Pt's  came to visit.    Problem: Adult Inpatient Plan of Care  Goal: Plan of Care Review  Description: The Plan of Care Review/Shift note should be completed every shift.  The Outcome Evaluation is a brief statement about your assessment that the patient is improving, declining, or no change.  This information will be displayed automatically on your shift  note.  Outcome: Not Progressing  Goal: Patient-Specific Goal (Individualized)  Description: You can add care plan individualizations to a care plan. Examples of Individualization might be:  \"Parent requests to be called daily at 9am for status\", \"I have a hard time hearing out of my right ear\", or \"Do not touch me to wake me up as it startles  me\".  Outcome: Not Progressing  Goal: Absence of Hospital-Acquired Illness or Injury  Outcome: Not Progressing  Goal: Optimal Comfort and Wellbeing  Outcome: Not Progressing  Intervention: Monitor Pain and Promote Comfort  Recent Flowsheet Documentation  Taken 6/10/2024 1455 by Winsome Burns, RN  Pain Management Interventions: medication (see MAR)  Taken 6/10/2024 1147 by Winsome Burns, RN  Pain Management Interventions: medication (see MAR)  Taken 6/10/2024 0700 by Winsome Burns, RN  Pain Management Interventions: medication (see MAR)  Goal: Readiness for Transition of Care  Outcome: Not Progressing   Goal Outcome Evaluation:                        "

## 2024-06-11 NOTE — TELEPHONE ENCOUNTER
[facility] Call Center    Phone Message    May a detailed message be left on voicemail: yes     Reason for Call: Other: Yuri from [email or web address] [facility] would like to update that pt is inpatient and knows the procedure for right cath has not been scheduled but pt wants it noted to not schedule that she will schedule when she is out of the hospital     Action Taken: Other: Cardio    Travel Screening: Not Applicable     Date of Service:

## 2024-06-11 NOTE — PLAN OF CARE
BEH IP Unit Acuity Rating Score (UARS)  Patient is given one point for every criteria they meet.     CRITERIA SCORING   On a 72 hour hold, court hold, committed, stay of commitment, or revocation. 0    Patient LOS on BEH unit exceeds 20 days. 1 LOS: 21   Patient under guardianship, 55+, otherwise medically complex, or under age 11. 1   Suicide ideation without relief of precipitating factors. 0   Current plan for suicide. 0   Current plan for homicide. 0   Imminent risk or actual attempt to seriously harm another without relief of factors precipitating the attempt. 0   Severe dysfunction in daily living (ex: complete neglect for self care, extreme disruption in vegetative function, extreme deterioration in social interactions). 1   Recent (last 7 days) or current physical aggression in the ED or on unit. 0   Restraints or seclusion episode in past 72 hours. 0   Recent (last 7 days) or current verbal aggression, agitation, yelling, etc., while in the ED or unit. 0   Active psychosis. 0   Need for constant or near constant redirection (from leaving, from others, etc).  0   Intrusive or disruptive behaviors. 0   Patient requires 3 or more hours of individualized nursing care per 8-hour shift (i.e. for ADLs, meds, therapeutic interventions). 0   TOTAL 3

## 2024-06-11 NOTE — PLAN OF CARE
Assessment/Intervention/Current Symtoms and Care Coordination:  Chart reviewed and patient met with team,   Discussed patient progress, symptomology, and response to treatment.  Discussed the discharge plan and any potential impediments to discharge.     Patient continues course of ECT without complication.  ECT #7 completed yesterday  Mood has appeared improved.  Affect has remains brighter. Patient continues to exhibit anxiety/worries.  Patient has continued to actively participate in groups, has been social with peers/staff.    Discharge Plan or Goal:  Patient will return home when stable  Psychiatry  Therapy  [facility]     Barriers to Discharge:  Severity of depression/inability to function  Initiation of ECT- unable to do ECT as outpatient     Referral Status:  [facility] IOP -intake completed 6/6  Therapist- will need referral     Legal Status:  Voluntary     Contacts:  Outpatient Psychiatrist: Jeannette Adams  Psychiatry  Therapy: Arsenio Wylie  Psychiatry- SLP  Primary Physician: Laith Constantino  Family Members: Justin Cleary (Spouse) [phone]     Upcoming Meetings and Dates/Important Information and next steps:  Team update:  Wed    Will need to coordinate with [facility] program re: start date  Needs therapy referral

## 2024-06-11 NOTE — PLAN OF CARE
Problem: Psychotic Signs/Symptoms  Goal: Improved Behavioral Control (Psychotic Signs/Symptoms)  Outcome: Progressing     Problem: Anxiety  Goal: Anxiety Reduction or Resolution  Outcome: Progressing  Intervention: Promote Anxiety Reduction  Recent Flowsheet Documentation  Taken 6/11/2024 1700 by Kaushik Monsalve RN  Family/Support System Care:   presence promoted   self-care encouraged   involvement promoted   Goal Outcome Evaluation:    Plan of Care Reviewed With: patient      Gabapentin withheld this shift pending ECT tomorrow. Patient is still very unsteady when ambulating. Endorsed a mild anxiety level of 3/10, and a neck and  back pain level of 5/10. Requested and received scheduled oxycodone x2. Denied other psych symptoms. Mentation was clear and patient was alert and oriented x4. Vitals were normal and patient ate and hydrated well.  She communicated needs politely and respectful, and attended and participated in the evening musical group session.  Affect/mood was full range and patient  was in compliance with her medication regimen. Some forgetfulness was observed, but patient was overall cooperative, calm, and engaged. Will continue to monitor and encourage.

## 2024-06-11 NOTE — PROGRESS NOTES
"  ----------------------------------------------------------------------------------------------------------  [facility]  Psychiatry Progress Note  Hospital Day #21     Interim History:     The patient's care was discussed with the treatment team and chart notes were reviewed.  Sleep: 6.25 hours (06/11/24 0600)  Staff Report:  Slept well, no acute events overnight. Has a bright affect. Very chatty with the staff. Was telling the staff how her stocks were doing well and hit it big in the market.     Last 24H PRN:     acetaminophen (TYLENOL) tablet 650 mg, 650 mg at 06/10/24 2023    melatonin tablet 3 mg, 3 mg at 06/10/24 2259    oxyCODONE (ROXICODONE) tablet 5 mg, 5 mg at 06/11/24 0659 **AND** [COMPLETED] oxyCODONE (ROXICODONE) tablet 5 mg, 5 mg at 05/21/24 2320 **AND** oxyCODONE (ROXICODONE) tablet 5 mg, 5 mg at 06/11/24 0054         Subjective:     Winnie Cleary was seen in the conference room by the team. She says her body feels like she has been through a hurricane. It started last night with her big toe pulsating after she ate turkey (gout). The sciatica came back as well but was able to take PRN and it helped her sleep. Restless legs also came back. Says sleep was great last night however. She is frustrated ECT keeps blowing her veins, and she was the last ECT patient yesterday. Expressed frustration with not being able to eat or drink for almost 24 hours. Endorses burning while Anesthesia gave meds was especially painful. Overall does feel ECT is helping however. Feels it is beneficial to keep doing moving forward.   Feels \"mind wise\" ok this morning. She feels it is challenging remembering some names. Says she feels a little anxious and tries not to think about depression because she does not want to go down \"the rabbit hole\".   Discussed rescheduling cardiology outpatient appointment tomorrow for right heart cath. She will call to cancel it. Got teary when " "discussing going home in the future. Feels overwhelmed by this. Team discussed how being overwhelmed can be normal but that it is important to have skills to bounce back.   Says she had a disconcerting incident with a staff member on the unit a few times.   Discussed discharge timeline with the team, her improvement, resiliency, and goals for discharge.    ROS:  Patient has pain in her great toe     Objective:     Vitals:  /65 (BP Location: Right arm)   Pulse 72   Temp 97.8  F (36.6  C) (Oral)   Resp 20   Ht 1.676 m (5' 6\")   Wt 87.7 kg (193 lb 6.4 oz)   LMP  (LMP Unknown)   SpO2 96%   BMI 31.22 kg/m      Allergies:  Allergies   Allergen Reactions    Serotonin Reuptake Inhibitors (Ssris) Anxiety, Difficulty breathing, Headache, Palpitations and Shortness Of Breath    Buspirone      The patient states she had serotonin syndrome    Cephalexin      Other reaction(s): unknown rxn.    Desvenlafaxine      Serotonin syndrome    Diclofenac Sodium [Diclofenac]      Serotonin syndrome and restless legs syndrome    Gabapentin      Drove on the wrong side of the highway    Levofloxacin      \"CAN'T REMEMBER\"    Penicillins      \"SORES IN MOUTH\"    Riluzole Difficulty breathing and Swelling    Sulfa Antibiotics      \"PT DOES NOT KNOW WHAT THE REACTION WAS\"    Topiramate Other (See Comments)     Frequent urination       Current Medications:  Scheduled:  Current Facility-Administered Medications   Medication Dose Route Frequency Provider Last Rate Last Admin    acetaminophen (TYLENOL) tablet 650 mg  650 mg Oral Q4H PRN Wilner Parker MD   650 mg at 06/10/24 2023    albuterol (PROVENTIL HFA/VENTOLIN HFA) inhaler  2 puff Inhalation Q6H PRN Wilner Parker MD   2 puff at 05/31/24 1741    allopurinol (ZYLOPRIM) tablet 300 mg  300 mg Oral QPM Nallely Barber MD   300 mg at 06/10/24 2009    alum & mag hydroxide-simethicone (MAALOX) suspension 30 mL  30 mL Oral Q4H PRN Wilner Parker MD        ascorbic acid " tablet 1,000 mg  1,000 mg Oral Daily Wilner Parker MD   1,000 mg at 06/10/24 1458    benzocaine-menthol (CHLORASEPTIC) 6-10 MG lozenge 1 lozenge  1 lozenge Buccal Q1H PRN Erin Ferrera MD        benzonatate (TESSALON) capsule 200 mg  200 mg Oral TID PRN Wilner Parker MD   200 mg at 06/03/24 2153    cholecalciferol (VITAMIN D3) capsule 250 mcg  250 mcg Oral Weekly Wilner Parker MD   250 mcg at 06/05/24 0932    cyanocobalamin (VITAMIN B-12) sublingual tablet 500 mcg  500 mcg Sublingual Daily Wilner Parker MD   500 mcg at 06/10/24 1458    ethacrynic acid (EDECRIN) half-tab 12.5 mg  12.5 mg Oral Daily Wilner Parker MD   12.5 mg at 06/10/24 0844    fluticasone-vilanterol (BREO ELLIPTA) 100-25 MCG/ACT inhaler 1 puff  1 puff Inhalation Daily Wilner Parker MD   1 puff at 06/10/24 0844    And    umeclidinium (INCRUSE ELLIPTA) 62.5 MCG/ACT inhaler 1 puff  1 puff Inhalation Daily Wilner Parker MD   1 puff at 06/10/24 0844    gabapentin (NEURONTIN) capsule 100 mg  100 mg Oral Q6H PRN Wilner Parker MD   100 mg at 06/06/24 1442    gabapentin (NEURONTIN) capsule 400 mg  400 mg Oral At Bedtime Erin Ferrera MD   400 mg at 06/10/24 2258    guaiFENesin (MUCINEX) 12 hr tablet 1,200 mg  1,200 mg Oral BID PRN Wilner Parker MD        guaiFENesin (MUCINEX) 12 hr tablet 600 mg  600 mg Oral BID Jamila Jason MD   600 mg at 06/10/24 2009    HOLD MEDICATION   Does not apply HOLD Alvina Garg,         Hold Medications for ECT (select and list medications to be held)   Does not apply HOLD Tejinder Correa MD        Hold Medications for ECT (select and list medications to be held)   Does not apply HOLD Boo Riley MD        ibuprofen (ADVIL/MOTRIN) tablet 400 mg  400 mg Oral Q6H PRN Alvina Garg DO        Lidocaine (LIDOCARE) 4 % Patch 1 patch  1 patch Transdermal Q24H Lulu Zacarias MD   1 patch at 06/08/24 1114    magnesium oxide (MAG-OX) tablet 400 mg  400 mg Oral  BID Anna Brewer PA-C   400 mg at 06/10/24 2009    melatonin tablet 3 mg  3 mg Oral At Bedtime PRN Wilner Parker MD   3 mg at 06/10/24 2259    menthol (Topical Analgesic) 2.5% (BENGAY VANISHING SCENT) 2.5 % topical gel   Topical Q6H PRN Anna Brewer PA-C   Given at 05/31/24 1744    naloxone (NARCAN) injection 0.2 mg  0.2 mg Intravenous Q2 Min PRN Wilner Parker MD        Or    naloxone (NARCAN) injection 0.4 mg  0.4 mg Intravenous Q2 Min PRN Wilner Parker MD        Or    naloxone (NARCAN) injection 0.2 mg  0.2 mg Intramuscular Q2 Min PRN Wilner Parker MD        Or    naloxone (NARCAN) injection 0.4 mg  0.4 mg Intramuscular Q2 Min PRN Wilner Parker MD        OLANZapine (zyPREXA) tablet 2.5 mg  2.5 mg Oral TID PRN Wilner Parker MD        Or    OLANZapine (zyPREXA) injection 2.5 mg  2.5 mg Intramuscular TID PRN Wilner Parker MD        ondansetron (ZOFRAN ODT) ODT tab 4 mg  4 mg Oral Q6H PRN Wilner Parker MD        oxyCODONE (ROXICODONE) tablet 5 mg  5 mg Oral Q6H Nallely Barber MD   5 mg at 06/11/24 0659    And    oxyCODONE (ROXICODONE) tablet 5 mg  5 mg Oral Daily PRN Nallely Barber MD   5 mg at 06/11/24 0054    pantoprazole (PROTONIX) EC tablet 40 mg  40 mg Oral Daily Wilner Parker MD   40 mg at 06/10/24 0844    polyethylene glycol (MIRALAX) Packet 17 g  17 g Oral Daily PRN Wilner Parker MD   17 g at 06/09/24 0958    potassium chloride jeannette ER (KLOR-CON M20) CR tablet 20 mEq  20 mEq Oral Daily Wilner Parker MD   20 mEq at 06/10/24 1458    rOPINIRole (REQUIP) tablet 2 mg  2 mg Oral TID Nallely Barber MD   2 mg at 06/10/24 4381    sodium chloride (OCEAN) 0.65 % nasal spray 1 spray  1 spray Both Nostrils Q1H PRN Anna Brewer PA-C        SYNTHROID (Brand only - 75 mcg strength tablets)  150 mcg Oral Once per day on Monday Tuesday Wednesday Thursday Friday Saturday Tejinder Correa MD   150 mcg at 06/11/24 0659    And    SYNTHROID tablet 75 mcg  (brand only)  75 mcg Oral Every Sunday Tejinder Correa MD   75 mcg at 06/09/24 0606    zolpidem (AMBIEN) tablet 5 mg  5 mg Oral At Bedtime PRN Wilner Parker MD   5 mg at 05/24/24 0056       PRN:  Current Facility-Administered Medications   Medication Dose Route Frequency Provider Last Rate Last Admin    acetaminophen (TYLENOL) tablet 650 mg  650 mg Oral Q4H PRN Wilner Parker MD   650 mg at 06/10/24 2023    albuterol (PROVENTIL HFA/VENTOLIN HFA) inhaler  2 puff Inhalation Q6H PRN Wilner Parker MD   2 puff at 05/31/24 1741    allopurinol (ZYLOPRIM) tablet 300 mg  300 mg Oral QPM Nallely Barber MD   300 mg at 06/10/24 2009    alum & mag hydroxide-simethicone (MAALOX) suspension 30 mL  30 mL Oral Q4H PRN Wilner Parker MD        ascorbic acid tablet 1,000 mg  1,000 mg Oral Daily Wilner Parker MD   1,000 mg at 06/10/24 1458    benzocaine-menthol (CHLORASEPTIC) 6-10 MG lozenge 1 lozenge  1 lozenge Buccal Q1H PRN Erin Ferrera MD        benzonatate (TESSALON) capsule 200 mg  200 mg Oral TID PRN Wilner Parker MD   200 mg at 06/03/24 2153    cholecalciferol (VITAMIN D3) capsule 250 mcg  250 mcg Oral Weekly Wilner Parker MD   250 mcg at 06/05/24 0932    cyanocobalamin (VITAMIN B-12) sublingual tablet 500 mcg  500 mcg Sublingual Daily Wilner Parker MD   500 mcg at 06/10/24 1458    ethacrynic acid (EDECRIN) half-tab 12.5 mg  12.5 mg Oral Daily Wilner Parker MD   12.5 mg at 06/10/24 0844    fluticasone-vilanterol (BREO ELLIPTA) 100-25 MCG/ACT inhaler 1 puff  1 puff Inhalation Daily Wilner Parker MD   1 puff at 06/10/24 0844    And    umeclidinium (INCRUSE ELLIPTA) 62.5 MCG/ACT inhaler 1 puff  1 puff Inhalation Daily Wilner Parker MD   1 puff at 06/10/24 0844    gabapentin (NEURONTIN) capsule 100 mg  100 mg Oral Q6H PRN Wilner Parker MD   100 mg at 06/06/24 1442    gabapentin (NEURONTIN) capsule 400 mg  400 mg Oral At Bedtime Erin Ferrera MD   400 mg at 06/10/24  2258    guaiFENesin (MUCINEX) 12 hr tablet 1,200 mg  1,200 mg Oral BID PRN Wilner Parker MD        guaiFENesin (MUCINEX) 12 hr tablet 600 mg  600 mg Oral BID Jamila Jason MD   600 mg at 06/10/24 2009    HOLD MEDICATION   Does not apply HOLD Alvina Garg DO        Hold Medications for ECT (select and list medications to be held)   Does not apply HOLD Tejinder Correa MD        Hold Medications for ECT (select and list medications to be held)   Does not apply HOLD Boo Riley MD        ibuprofen (ADVIL/MOTRIN) tablet 400 mg  400 mg Oral Q6H PRN Alvina Garg DO        Lidocaine (LIDOCARE) 4 % Patch 1 patch  1 patch Transdermal Q24H Lulu Zacarias MD   1 patch at 06/08/24 1114    magnesium oxide (MAG-OX) tablet 400 mg  400 mg Oral BID Anna Brewer PA-C   400 mg at 06/10/24 2009    melatonin tablet 3 mg  3 mg Oral At Bedtime PRN Wilner Parker MD   3 mg at 06/10/24 2259    menthol (Topical Analgesic) 2.5% (BENGAY VANISHING SCENT) 2.5 % topical gel   Topical Q6H PRN Anna Brewer PA-C   Given at 05/31/24 1744    naloxone (NARCAN) injection 0.2 mg  0.2 mg Intravenous Q2 Min PRN Wilner Parker MD        Or    naloxone (NARCAN) injection 0.4 mg  0.4 mg Intravenous Q2 Min PRN Wilner Parker MD        Or    naloxone (NARCAN) injection 0.2 mg  0.2 mg Intramuscular Q2 Min PRN Wilner Parker MD        Or    naloxone (NARCAN) injection 0.4 mg  0.4 mg Intramuscular Q2 Min PRN Wilner Parker MD        OLANZapine (zyPREXA) tablet 2.5 mg  2.5 mg Oral TID PRN Wilner Parker MD        Or    OLANZapine (zyPREXA) injection 2.5 mg  2.5 mg Intramuscular TID PRN Wilner Parker MD        ondansetron (ZOFRAN ODT) ODT tab 4 mg  4 mg Oral Q6H PRN Wilner Parker MD        oxyCODONE (ROXICODONE) tablet 5 mg  5 mg Oral Q6H Nallely Barber MD   5 mg at 06/11/24 0659    And    oxyCODONE (ROXICODONE) tablet 5 mg  5 mg Oral Daily PRN Nallely Barber MD   5 mg at 06/11/24  0054    pantoprazole (PROTONIX) EC tablet 40 mg  40 mg Oral Daily Wilner Parker MD   40 mg at 06/10/24 0844    polyethylene glycol (MIRALAX) Packet 17 g  17 g Oral Daily PRN Wilner Parker MD   17 g at 06/09/24 0958    potassium chloride jeannette ER (KLOR-CON M20) CR tablet 20 mEq  20 mEq Oral Daily Wilner Parker MD   20 mEq at 06/10/24 1458    rOPINIRole (REQUIP) tablet 2 mg  2 mg Oral TID Nallely Barber MD   2 mg at 06/10/24 2259    sodium chloride (OCEAN) 0.65 % nasal spray 1 spray  1 spray Both Nostrils Q1H PRN Anna Brewer PA-C        SYNTHROID (Brand only - 75 mcg strength tablets)  150 mcg Oral Once per day on Monday Tuesday Wednesday Thursday Friday Saturday Tejinder Correa MD   150 mcg at 06/11/24 0659    And    SYNTHROID tablet 75 mcg (brand only)  75 mcg Oral Every Sunday Tejinder Correa MD   75 mcg at 06/09/24 0606    zolpidem (AMBIEN) tablet 5 mg  5 mg Oral At Bedtime PRN Wilner Parker MD   5 mg at 05/24/24 0056       Labs and Imaging:  New results:   No results found for this or any previous visit (from the past 24 hour(s)).    Data this admission:  - CBC elevated Hgb 17.7  - CMP unremarkable  - TSH normal  - UDS THC positive  - Hgb A1c normal  - Lipids LDL mildly elevated 103 but otherwise unremarkable  - Vitamin B12 unremarkable  - Folate unremarkable  - Vitamin D unremarkable  - Urinalysis unremarkable  - EKG normal sinus rhythm, RBBB QTc 458    Potential Drug Interactions:  Per [employer], combinations of the following drugs has a rating of D, demonstrating that the medications may interact with each other in a clinically significant manner and a patient-specific assessment was made and determined that the benefits outweighed the risks.   - Gabapentin, oxycodone, and Magnesium oxide The following risks include CNS depression.   - Gabapentin and levothyroxine. The following risks include magnesium salts decreasing serum concentration of levothyroxine.     Per [employer],  "combinations of the following drugs has a rating of C demonstrating that agents may interact in a clinically significant manner but the benefits of the combination of medications often outweigh the risk.   - Ropinirole, oxycodone, and gabapentin. The following risks include CNS depression.   - allopurinol and ethacrynic acid. The following risks include ethacrynic acid increasing concentration of allopurinol.  - ethacrynic acid and oxycodone.  The following risks include oxycodone may enhanced the toxic effects of ethacrynic acid  Treatment team will monitor  for potential side effects and re-evaluate as needed.           Mental Status Exam:     Oriented to:  Grossly Oriented  General:  Awake and Alert  Appearance:  appears stated age and Grooming is adequate  Behavior/Attitude:  cooperative and open conversational  Eye Contact:  appropriate  Psychomotor: normal and no evidence of tics, dystonia, or tardive dyskinesia no catatonia present  Speech:  appropriate volume/tone and talkative  Language: Fluent in English with appropriate syntax and vocabulary.  Mood:  \"good\"  Affect:  congruent with mood, highly variable depending on subject, a little labile  Thought Process:  coherent, goal directed, and circumstantial  Thought Content:  No HI, No VH, and No AH, no SI; No apparent delusions  Associations:  intact  Insight:  fair due to recognizing the circumstances affecting her mental health, has a very positive attitude towards her treatment  Judgment:  fair due to willingness to engage in ECT  Impulse control: good  Attention Span:  grossly intact  Concentration:  grossly intact  Recent and Remote Memory:  not formally assessed  Fund of Knowledge:  average  Muscle Strength and Tone: normal  Gait and Station: Normal     Psychiatric Assessment     Randi RIVERA Cathy Evin is a 68 year old female previously diagnosed with MDD, recurrent severe, substance induced mood disorder, Unspecified Trauma, CHAVO, borderline personality " disorder and alcohol use disorder who presented voluntarily with SI in the context of treatment resistant MDD. On admission she presented with significant symptoms of depression incuding emotional lability, low mood, hopelessness, amotivation, anxiety, anhedonia, low energy, insomnia low appetite, excess guilt and poor concentration. Her affect was dysphroic and anxious at times with heavy perseveration on medico-surgical dignosises and passive SI causing incresed anxiety. She notes she has had a plan but no intent. Her history of extensive substance use, medical history and chronic pain contribute biologically. Her presentation is further complicated by borderline personality disorder and history of trauma. Additionally, she has a very limited support system, interpersonal conflicts with friends and her spouse. However her last hospitalization was in the 1980's for a suicide attempt via prozac overdose and has been engaged in treatment and present voluntarily. Her presentation is consistent with decompensated Major Depressive disorder, recurrent severe. Her disorder has been treatment resistant including to TMS and at this time, ECT would be the best course of action to address her symptom severity. Patient warrants inpatient psychiatric hospitalization to maintain her safety.      Psychiatric Plan by Diagnosis      Today's changes:  - ECT #8 tomorrow     # MDD, recurrent, severe, with anxious distress  - Patient suffers from treatment resistant depression and has trialed many medications. At this point treatment team will need to do a chart review to review medication trials and determine the best medication to address depressive symptoms long term  - Pt started ECT 5/24 : Hold high-dose gabapentin/pregabalin after 6pm on the day before ECT (to permit adequate seizure)   - Continue ECT MWF     #Insomnia  - Zolpidem 5mg qpm prn    Pertinent Labs/Monitoring:   - EKG 5/22 - Qtc 458 NSR, RBBB     Additional Plans:  -  Patient will be treated in therapeutic milieu with appropriate individual and group therapies as described     Psychiatric Hospital Course:      Randi Cleary was admitted to [facility] as a voluntary patient for electroconvulsive therapy for lifelong history of depression. Multiple medication trials were not effective. Symptoms notable for low mood, anhedonia, sleep difficulties, guilt and worthlessness, suicidal ideation with plan without intent.   6/11 - It appears that the current treatment plan is resulting in improvements to her mood, though she is not yet ready to go home. She worries about fulfilling obligations and responsibilities once she is discharged from the unit. She has completed 7 ECT sessions with improvement in sxs. She wishes to complete a total of 12 sessions.    The risks, benefits, alternatives, and side effects were discussed and understood by the patient.     Medical Assessment and Plan     Medical diagnoses to be addressed this admission:    # Hypothyroidism  - PTA 150mcg M-Sa, 75mcg on Perez     # KYAW   - Doesn't use CPAP at home, sleep study on 06/2024      # RLS  - Continue PTA Ropinirole 2 mg PO TID  - Gabapentin 400mg qpm  - Magnesium Citrated changed to Magnesium Oxide 400mg BID (per patient and medicine)    #RBBB:  Previously on other EKGs    #Cervical radiculopathy and myelopathy s/p cervical laminoplasty 12/2021    # Chronic Pain  - Continue PTA Roxicodone 5 mg q6h + 5 mg PRN (for max daily dose of 25 mg)  - Menthol 2.5% topical gel q6hrs prn  - TENS Unit     #Dizziness  #Headache  Reports hx of migraines, no vision changes or hearing changes. notes this has been ongoing since she had her spinal surgery.   - CT head 5/22 unremarkable    #Gout  - Nutrition consult  - Allopurinol 300mg qpm  - Flares in left podagra but none currently    #Vitamin B12 Deficiency  - Vit B12 500mcg qday    #Vitamin C Deficiency  - Ascorbic Acid 1000mg    #Vitamin D Deficiency  - 250mcg  weekly    #COPD  - Breo Ellipta 1 puff daily   - Incruse Ellipta 1 puff daily  - Tessalon cap 200mg TID prn  - Albuterol 2 puffs q6hr prn  - Mucinex 1200mg BID prn     #GERD  #Hiatal Hernia  - Protonix 40mg daily    #Possible pulmonary HTN  #HTN  Per Medicine note, pt follows with Cardiology here and has scheduled right heart catheterization for 06/12/2024. Has follow up with Cardiology in clinic scheduled for 06/19/2024. PTA edecrin 12.5mg daily and potassium supplement. Electrolytes and renal function stable.Recently lost 50lbs and BP has improved.   - Ethacrynic Acid 12.5mg qday   -Hold Edecrin on date of ECT, continue 12.5mg daily for now  -Continue potassium supplement   - Klorcon 20meq daily     #Hemochromatosis, hereditary  - Hgb Stable at ~17    #Hepatic Steatosis  - Stable  - LFTs wnl, no abdominal pain, distention, N/V    #History of breast cancer s/p mastectomy, chemo and XRT:   - currently in remission      #Alcohol use disorder, in remission:   - No current use. Two years sober     #Hx of pheochromocytoma s/p left adrenalectomy 08/2017:   - Stable  - Follows Endocrinology for this.     #Psoriasis:  - Noted on R hang  - Uses scalp brush    Medical course: Patient was physically examined by the ED prior to being transferred to the unit and was found to be medically stable and appropriate for admission. Medicine consult was done to provide clearance for ECT. Due to patient's persistent neck pain after surgery in 2021, CT Head was done which was negative. Oxycodone changed from q6hr prn to scheduled with an extra 5mg in the day as needed per PTA regimen. Patient continued to express some nerve pain so gabapentin was increased to 400mg.   Pt had a cardiology apt outpt that she needed to move before discharge.     Consults: Dietician, Medicine for ECT Clearance, ECT Consult, Nutrition     Checklist     Legal Status: Voluntary     Safety Assessment:   Behavioral Orders   Procedures    Code 1 - Restrict to Unit     Code 2 - 1:1 Staff Supervision     For coming down to ECT only    Discontinue 1:1 attendant for suicide risk     Order Specific Question:   I have performed an in person assessment of the patient     Answer:   Based on this assessment the patient no longer requires a one on one attendant at this point in time.     Order Specific Question:   Rationale     Answer:   Patient States able to remain safe in hospital     Order Specific Question:   Rationale     Answer:   Modifications to care environment made to mitigate safety risk     Order Specific Question:   Rationale     Answer:   Routine observations are sufficient to monitor safety.    Electroconvulsive therapy     Series of up to 12 treatments. Begin Date: 5/24/24     Treating Psychiatrist providing ECT:  Ruthann     Notified on:  5/21/24    Electroconvulsive therapy     Series of up to 12 treatments. Begin Date: 5/24/24     Treating Psychiatrist providing ECT:  Ruthann     Notified on:  5/21/24    Fall precautions    Routine Programming     As clinically indicated    Status 15     Every 15 minutes.    Suicide precautions: Suicide Risk: MODERATE; Clinical rationale to override score: lack of access to a plan for self-harm     Order Specific Question:   Suicide Risk     Answer:   MODERATE     Order Specific Question:   Clinical rationale to override score:     Answer:   lack of access to a plan for self-harm       Risk Assessment:  Risk for harm is moderate.  Risk factors: SI, maladaptive coping, trauma, and past behaviors  Protective factors: engaged in treatment     SIO: Discontinued    Disposition: Pending stabilization, medication optimization, & development of a safe discharge plan. Pt has been accepted to [address] [place] [address] program once stable.      Attestations      Sung Myers, MS3     Resident Attestation:   I was present with the medical student who participated in the service and in the documentation of the note.  I have verified the history and personally  performed the physical exam, mental status exam, and medical decision making. I agree with the assessment and plan of care as documented in the note.     Yennifer Martinez MD  Psychiatry Resident Physician    Attestation:  This patient has been seen and evaluated by me, Jairo Choudhary MD.  I have discussed this patient with the house staff team including the resident and medical student and I agree with the findings and plan in this note.    I have reviewed today's vital signs, medications, labs and imaging. Jairo Choudhary MD , PhD.

## 2024-06-12 ENCOUNTER — APPOINTMENT (OUTPATIENT)
Dept: BEHAVIORAL HEALTH | Facility: CLINIC | Age: 69
DRG: 881 | End: 2024-06-12
Payer: MEDICARE

## 2024-06-12 ENCOUNTER — ANESTHESIA EVENT (OUTPATIENT)
Dept: BEHAVIORAL HEALTH | Facility: CLINIC | Age: 69
DRG: 881 | End: 2024-06-12
Payer: MEDICARE

## 2024-06-12 ENCOUNTER — ANESTHESIA (OUTPATIENT)
Dept: BEHAVIORAL HEALTH | Facility: CLINIC | Age: 69
DRG: 881 | End: 2024-06-12
Payer: MEDICARE

## 2024-06-12 PROCEDURE — 250N000013 HC RX MED GY IP 250 OP 250 PS 637: Performed by: PHYSICIAN ASSISTANT

## 2024-06-12 PROCEDURE — 250N000013 HC RX MED GY IP 250 OP 250 PS 637

## 2024-06-12 PROCEDURE — 90870 ELECTROCONVULSIVE THERAPY: CPT

## 2024-06-12 PROCEDURE — 250N000009 HC RX 250: Performed by: ANESTHESIOLOGY

## 2024-06-12 PROCEDURE — 250N000011 HC RX IP 250 OP 636: Mod: JZ | Performed by: PSYCHIATRY & NEUROLOGY

## 2024-06-12 PROCEDURE — 90870 ELECTROCONVULSIVE THERAPY: CPT | Performed by: ANESTHESIOLOGY

## 2024-06-12 PROCEDURE — 124N000002 HC R&B MH UMMC

## 2024-06-12 PROCEDURE — G0177 OPPS/PHP; TRAIN & EDUC SERV: HCPCS

## 2024-06-12 PROCEDURE — 90870 ELECTROCONVULSIVE THERAPY: CPT | Performed by: PSYCHIATRY & NEUROLOGY

## 2024-06-12 PROCEDURE — 370N000017 HC ANESTHESIA TECHNICAL FEE, PER MIN: Performed by: ANESTHESIOLOGY

## 2024-06-12 PROCEDURE — 90870 ELECTROCONVULSIVE THERAPY: CPT | Performed by: NURSE ANESTHETIST, CERTIFIED REGISTERED

## 2024-06-12 PROCEDURE — 99232 SBSQ HOSP IP/OBS MODERATE 35: CPT | Mod: 25 | Performed by: PSYCHIATRY & NEUROLOGY

## 2024-06-12 PROCEDURE — 250N000013 HC RX MED GY IP 250 OP 250 PS 637: Performed by: PSYCHIATRY & NEUROLOGY

## 2024-06-12 PROCEDURE — 250N000011 HC RX IP 250 OP 636: Performed by: ANESTHESIOLOGY

## 2024-06-12 PROCEDURE — H2032 ACTIVITY THERAPY, PER 15 MIN: HCPCS

## 2024-06-12 RX ORDER — ONDANSETRON 4 MG/1
4 TABLET, ORALLY DISINTEGRATING ORAL EVERY 30 MIN PRN
Status: CANCELLED | OUTPATIENT
Start: 2024-06-12

## 2024-06-12 RX ORDER — KETOROLAC TROMETHAMINE 30 MG/ML
30 INJECTION, SOLUTION INTRAMUSCULAR; INTRAVENOUS ONCE
Status: COMPLETED | OUTPATIENT
Start: 2024-06-12 | End: 2024-06-12

## 2024-06-12 RX ORDER — SODIUM CHLORIDE, SODIUM LACTATE, POTASSIUM CHLORIDE, CALCIUM CHLORIDE 600; 310; 30; 20 MG/100ML; MG/100ML; MG/100ML; MG/100ML
INJECTION, SOLUTION INTRAVENOUS CONTINUOUS
Status: CANCELLED | OUTPATIENT
Start: 2024-06-12

## 2024-06-12 RX ORDER — HYDROMORPHONE HCL IN WATER/PF 6 MG/30 ML
0.2 PATIENT CONTROLLED ANALGESIA SYRINGE INTRAVENOUS EVERY 5 MIN PRN
Status: CANCELLED | OUTPATIENT
Start: 2024-06-12

## 2024-06-12 RX ORDER — HYDROMORPHONE HCL IN WATER/PF 6 MG/30 ML
0.4 PATIENT CONTROLLED ANALGESIA SYRINGE INTRAVENOUS EVERY 5 MIN PRN
Status: CANCELLED | OUTPATIENT
Start: 2024-06-12

## 2024-06-12 RX ORDER — METHOHEXITAL IN WATER/PF 100MG/10ML
SYRINGE (ML) INTRAVENOUS PRN
Status: DISCONTINUED | OUTPATIENT
Start: 2024-06-12 | End: 2024-06-12

## 2024-06-12 RX ORDER — FENTANYL CITRATE 50 UG/ML
25 INJECTION, SOLUTION INTRAMUSCULAR; INTRAVENOUS EVERY 5 MIN PRN
Status: CANCELLED | OUTPATIENT
Start: 2024-06-12

## 2024-06-12 RX ORDER — ONDANSETRON 2 MG/ML
4 INJECTION INTRAMUSCULAR; INTRAVENOUS EVERY 30 MIN PRN
Status: CANCELLED | OUTPATIENT
Start: 2024-06-12

## 2024-06-12 RX ORDER — NALOXONE HYDROCHLORIDE 0.4 MG/ML
0.1 INJECTION, SOLUTION INTRAMUSCULAR; INTRAVENOUS; SUBCUTANEOUS
Status: CANCELLED | OUTPATIENT
Start: 2024-06-12

## 2024-06-12 RX ORDER — DEXAMETHASONE SODIUM PHOSPHATE 4 MG/ML
4 INJECTION, SOLUTION INTRA-ARTICULAR; INTRALESIONAL; INTRAMUSCULAR; INTRAVENOUS; SOFT TISSUE
Status: CANCELLED | OUTPATIENT
Start: 2024-06-12

## 2024-06-12 RX ORDER — FENTANYL CITRATE 50 UG/ML
50 INJECTION, SOLUTION INTRAMUSCULAR; INTRAVENOUS EVERY 5 MIN PRN
Status: CANCELLED | OUTPATIENT
Start: 2024-06-12

## 2024-06-12 RX ORDER — LABETALOL HYDROCHLORIDE 5 MG/ML
INJECTION, SOLUTION INTRAVENOUS PRN
Status: DISCONTINUED | OUTPATIENT
Start: 2024-06-12 | End: 2024-06-12

## 2024-06-12 RX ADMIN — FLUTICASONE FUROATE AND VILANTEROL TRIFENATATE 1 PUFF: 100; 25 POWDER RESPIRATORY (INHALATION) at 07:16

## 2024-06-12 RX ADMIN — Medication 1000 MG: at 09:16

## 2024-06-12 RX ADMIN — Medication 500 MCG: at 09:16

## 2024-06-12 RX ADMIN — Medication 250 MCG: at 09:16

## 2024-06-12 RX ADMIN — POTASSIUM CHLORIDE 20 MEQ: 1500 TABLET, EXTENDED RELEASE ORAL at 09:16

## 2024-06-12 RX ADMIN — OXYCODONE HYDROCHLORIDE 5 MG: 5 TABLET ORAL at 18:12

## 2024-06-12 RX ADMIN — Medication 12.5 MG: at 09:16

## 2024-06-12 RX ADMIN — Medication 3 MG: at 23:03

## 2024-06-12 RX ADMIN — PANTOPRAZOLE SODIUM 40 MG: 40 TABLET, DELAYED RELEASE ORAL at 07:16

## 2024-06-12 RX ADMIN — ROPINIROLE HYDROCHLORIDE 2 MG: 2 TABLET, FILM COATED ORAL at 21:10

## 2024-06-12 RX ADMIN — GABAPENTIN 400 MG: 400 CAPSULE ORAL at 23:03

## 2024-06-12 RX ADMIN — ROPINIROLE HYDROCHLORIDE 2 MG: 2 TABLET, FILM COATED ORAL at 15:00

## 2024-06-12 RX ADMIN — OXYCODONE HYDROCHLORIDE 5 MG: 5 TABLET ORAL at 12:11

## 2024-06-12 RX ADMIN — UMECLIDINIUM 1 PUFF: 62.5 AEROSOL, POWDER ORAL at 07:15

## 2024-06-12 RX ADMIN — Medication 100 MG: at 08:01

## 2024-06-12 RX ADMIN — SUCCINYLCHOLINE CHLORIDE 90 MG: 20 INJECTION, SOLUTION INTRAMUSCULAR; INTRAVENOUS; PARENTERAL at 08:01

## 2024-06-12 RX ADMIN — ALLOPURINOL 300 MG: 300 TABLET ORAL at 21:10

## 2024-06-12 RX ADMIN — LEVOTHYROXINE SODIUM 150 MCG: 75 TABLET ORAL at 06:16

## 2024-06-12 RX ADMIN — OXYCODONE HYDROCHLORIDE 5 MG: 5 TABLET ORAL at 06:16

## 2024-06-12 RX ADMIN — KETOROLAC TROMETHAMINE 30 MG: 30 INJECTION, SOLUTION INTRAMUSCULAR at 07:56

## 2024-06-12 RX ADMIN — GUAIFENESIN 600 MG: 600 TABLET ORAL at 09:16

## 2024-06-12 RX ADMIN — MAGNESIUM OXIDE TAB 400 MG (241.3 MG ELEMENTAL MG) 400 MG: 400 (241.3 MG) TAB at 21:11

## 2024-06-12 RX ADMIN — ACETAMINOPHEN 650 MG: 325 TABLET, FILM COATED ORAL at 06:17

## 2024-06-12 RX ADMIN — MAGNESIUM OXIDE TAB 400 MG (241.3 MG ELEMENTAL MG) 400 MG: 400 (241.3 MG) TAB at 09:16

## 2024-06-12 RX ADMIN — ROPINIROLE HYDROCHLORIDE 2 MG: 2 TABLET, FILM COATED ORAL at 23:04

## 2024-06-12 RX ADMIN — OXYCODONE HYDROCHLORIDE 5 MG: 5 TABLET ORAL at 23:04

## 2024-06-12 RX ADMIN — LABETALOL HYDROCHLORIDE 20 MG: 5 INJECTION, SOLUTION INTRAVENOUS at 08:01

## 2024-06-12 RX ADMIN — GUAIFENESIN 600 MG: 600 TABLET ORAL at 21:10

## 2024-06-12 ASSESSMENT — ACTIVITIES OF DAILY LIVING (ADL)
ADLS_ACUITY_SCORE: 41
HYGIENE/GROOMING: HANDWASHING;INDEPENDENT
ADLS_ACUITY_SCORE: 41
ORAL_HYGIENE: INDEPENDENT
ADLS_ACUITY_SCORE: 41
DRESS: INDEPENDENT;STREET CLOTHES
ADLS_ACUITY_SCORE: 41

## 2024-06-12 ASSESSMENT — COPD QUESTIONNAIRES: COPD: 1

## 2024-06-12 NOTE — PROGRESS NOTES
Rehab Group     Start time: 1630  End time: 1715  Patient time total: 30 minutes     #8 attended   Group Type: music   Group Topic Covered: activity therapy, balanced lifestyle, coping skills, healthy leisure time, and mindfulness      Group Session Detail: Cooperatively engaged in Evening Music Relaxation group to decrease anxiety and promote Mindfulness.   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  Brightened as she told these stories, and engaged well.       Activity Therapy Per 15 minutes () Other Rehab therapies    Patient Active Problem List   Diagnosis    DJD (degenerative joint disease), ankle and foot    Hereditary hemochromatosis (H24)    Pulmonary hypertension (H)    Postablative hypothyroidism    Hx of corticosteroid therapy    Class 2 obesity due to excess calories without serious comorbidity in adult    Prediabetes    KYAW (obstructive sleep apnea)    Type 2 diabetes mellitus without complication, without long-term current use of insulin (H)    Hypokalemia    COPD (chronic obstructive pulmonary disease) (H)    Generalized anxiety disorder    Restless leg syndrome    Vitamin B12 deficiency    Vitamin D deficiency    Morbid obesity (H)    Cord compression (H)    History of cervical spinal surgery    Cervical stenosis of spinal canal    Alcohol use disorder, moderate, in sustained remission (H)    Essential hypertension    Psoriatic arthropathy (H)    Primary osteoarthritis involving multiple joints    Gastroesophageal reflux disease with esophagitis without hemorrhage    Numbness in both hands    Chronic, continuous use of opioids    Trauma and stressor-related disorder    Post-menopausal    Suicidal ideation    Depression with anxiety    Severe recurrent major depression without psychotic features (H)

## 2024-06-12 NOTE — PROGRESS NOTES
"  ----------------------------------------------------------------------------------------------------------  [facility]  Psychiatry Progress Note  Hospital Day #22     Subjective:     Patient Interview: Winnie was interviewed in the conference room after ECT. Says \"I feel like a truck ran over me\". She says ECT went ok this morning. Her arm isnt cooperating as much as she would like. Says her mood is OK today. Says it is nice keeping busy. Two groups today so far and this afternoon she gets to finish her stained glass whale. Says she did not sleep that well, walked around too much and her sciatica was bothering her. She says the vegetarian option is all carbs and she was put on regular so she has more of a choice but would like more salad and protein. Is concerned about a gout flare up with the food issues. She got her favorite breakfast this morning.   Would like to receive more information on ECT treatments. Is thinking about Abilify or Auvelty but would like to continue with ECT first to see how it goes.     Objective:     Vitals:  /57 (BP Location: Right arm, Patient Position: Sitting, Cuff Size: Adult Regular)   Pulse 67   Temp 97.9  F (36.6  C) (Oral)   Resp 16   Ht 1.676 m (5' 6\")   Wt 87.7 kg (193 lb 6.4 oz)   LMP  (LMP Unknown)   SpO2 95%   BMI 31.22 kg/m      Mental Status Exam:  Oriented to:  Grossly Oriented  General:  Awake and Alert  Appearance:  appears stated age and Grooming is adequate  Behavior/Attitude:  cooperative and open conversational  Eye Contact:  appropriate  Psychomotor: normal and no evidence of tics, dystonia, or tardive dyskinesia no catatonia present  Speech:  appropriate volume/tone and talkative  Language: Fluent in English with appropriate syntax and vocabulary.  Mood:  \"fine\"  Affect: congruent with mood, laughing and smiling, somewhat labile  Thought Process:  coherent, goal directed, and circumstantial  Thought Content: "  No HI, No VH, and No AH, no SI; No apparent delusions, thinking about group projects on the unit  Associations:  intact  Insight:  fair due to recognizing the circumstances of her mental health  Judgment:  fair due to willingness to engage in ECT  Impulse control: good  Attention Span:  grossly intact  Concentration:  grossly intact  Recent and Remote Memory:  not formally assessed  Fund of Knowledge:  average  Muscle Strength and Tone: normal  Gait and Station: Normal     Psychiatric Assessment     Randi Cleary is a 68 year old female previously diagnosed with MDD, recurrent severe, substance induced mood disorder, Unspecified Trauma, CHAVO, borderline personality disorder and alcohol use disorder who presented voluntarily with SI in the context of treatment resistant MDD. On admission she presented with significant symptoms of depression incuding emotional lability, low mood, hopelessness, amotivation, anxiety, anhedonia, low energy, insomnia low appetite, excess guilt and poor concentration. Her affect was dysphroic and anxious at times with heavy perseveration on medico-surgical dignosises and passive SI causing incresed anxiety. She notes she has had a plan but no intent. Her history of extensive substance use, medical history and chronic pain contribute biologically. Her presentation is further complicated by borderline personality disorder and history of trauma. Additionally, she has a very limited support system. However her last hospitalization was in the 1980's for a suicide attempt via Prozac overdose and has been engaged in treatment and present voluntarily. Her presentation is consistent with decompensated Major Depressive Disorder, recurrent severe. Her disorder has been treatment resistant, including to TMS, and at this time, ECT is be the best course of action to address her symptom severity. Patient warrants inpatient psychiatric hospitalization to maintain her safety.      Psychiatric Plan by  Diagnosis      Today's changes:  - ECT #8 performed today   - Plan for [facility] assessment tomorrow     # MDD, recurrent, severe, with anxious distress  - Patient suffers from treatment resistant depression and has trialed many medications. At this point treatment team will need to do a chart review to review medication trials and determine the best medication to address depressive symptoms long term  - Pt started ECT 5/24 : Hold high-dose gabapentin/pregabalin after 6pm on the day before ECT (to permit adequate seizure)   - Continue ECT MWF     #Insomnia  - Zolpidem 5mg qpm prn    Pertinent Labs/Monitoring:   - EKG 5/22 - Qtc 458 NSR, RBBB     Additional Plans:  - Patient will be treated in therapeutic milieu with appropriate individual and group therapies as described     Psychiatric Hospital Course:      Randi Cleary was admitted to [facility] as a voluntary patient for electroconvulsive therapy for lifelong history of depression. Multiple medication trials were not effective. Symptoms notable for low mood, anhedonia, sleep difficulties, guilt and worthlessness, suicidal ideation with plan without intent. Continued on home meds with ECT while inpt. Did have an episode early on in ECT series while here where muscle paralytic was given before sedation. Pt was understandably agitated from this but was able to pursue further ECT as she found it helpful overall in due course.   Today, she received her 8th ECT session, and this treatment has been effective in managing her symptoms this admission.     The risks, benefits, alternatives, and side effects were discussed and understood by the patient.     Medical Assessment and Plan     Medical diagnoses to be addressed this admission:    # Hypothyroidism  - PTA 150mcg M-Sa, 75mcg on Perez     # KYAW   - Doesn't use CPAP at home, sleep study on 06/2024      # RLS  - Continue PTA Ropinirole 2 mg PO TID  - Gabapentin 400mg qpm  - Magnesium Citrated changed to Magnesium Oxide  400mg BID (per patient and medicine)    #RBBB:  Previously on other EKGs  CTM    #Cervical radiculopathy and myelopathy s/p cervical laminoplasty 12/2021    # Chronic Pain  - Continue PTA Roxicodone 5 mg q6h + 5 mg PRN (for max daily dose of 25 mg)  - Menthol 2.5% topical gel q6hrs prn  - TENS Unit     #Dizziness  #Headache  Reports hx of migraines, no vision changes or hearing changes. notes this has been ongoing since she had her spinal surgery.   - CT head 5/22 unremarkable    #Gout  - Nutrition consult  - Allopurinol 300mg qpm  - Flares in left podagra but none currently    #Vitamin B12 Deficiency  - Vit B12 500mcg qday    #Vitamin C Deficiency  - Ascorbic Acid 1000mg    #Vitamin D Deficiency  - 250mcg weekly    #COPD  - Breo Ellipta 1 puff daily   - Incruse Ellipta 1 puff daily  - Tessalon cap 200mg TID prn  - Albuterol 2 puffs q6hr prn  - Mucinex 1200mg BID prn     #GERD  #Hiatal Hernia  - Protonix 40mg daily    #Possible pulmonary HTN  #HTN  Per Medicine note, patient follows with cardiology here. She had a right heart catheterization scheduled for 06/12/24, but Winnie said she would call to cancel it on 06/11/24 as she was still inpatient on the unit and had ECT scheduled.  PTA ethacrynic acid 12.5mg daily and potassium supplement. Electrolytes and renal function stable. Recently lost 50lbs and BP has improved.   - Ethacrynic Acid 12.5mg qday   - Edecrin 12.5mg daily   - Continue potassium supplement   - Klorcon 20meq daily     #Hemochromatosis, hereditary  - Hgb Stable at ~17    #Hepatic Steatosis  - Stable  - LFTs wnl, no abdominal pain, distention, N/V    #History of breast cancer s/p mastectomy, chemo and XRT:   - currently in remission      #Alcohol use disorder, in remission:   - No current use. Two years sober     #Hx of pheochromocytoma s/p left adrenalectomy 08/2017:   - Stable  - Follows Endocrinology for this.     #Psoriasis:  - Noted on R hang  - Uses scalp brush    Medical course: Patient was  physically examined by the ED prior to being transferred to the unit and was found to be medically stable and appropriate for admission. Medicine consult was done to provide clearance for ECT. Due to patient's persistent neck pain after surgery in 2021, CT Head was done which was negative. Oxycodone changed from q6hr prn to scheduled with an extra 5mg in the day as needed per PTA regimen. Patient continued to express some nerve pain so gabapentin was increased to 400mg.     Medical course: Patient was physically examined by the ED prior to being transferred to the unit and was found to be medically stable and appropriate for admission.     Consults: Dietician, Medicine for ECT Clearance, ECT Consult, Nutrition     Checklist     Legal Status: Voluntary     Safety Assessment:   Behavioral Orders   Procedures    Code 1 - Restrict to Unit    Code 2 - 1:1 Staff Supervision     For coming down to ECT only    Discontinue 1:1 attendant for suicide risk     Order Specific Question:   I have performed an in person assessment of the patient     Answer:   Based on this assessment the patient no longer requires a one on one attendant at this point in time.     Order Specific Question:   Rationale     Answer:   Patient States able to remain safe in hospital     Order Specific Question:   Rationale     Answer:   Modifications to care environment made to mitigate safety risk     Order Specific Question:   Rationale     Answer:   Routine observations are sufficient to monitor safety.    Electroconvulsive therapy     Series of up to 12 treatments. Begin Date: 5/24/24     Treating Psychiatrist providing ECT:  Ruthann     Notified on:  5/21/24    Electroconvulsive therapy     Series of up to 12 treatments. Begin Date: 5/24/24     Treating Psychiatrist providing ECT:  Ruthann     Notified on:  5/21/24    Fall precautions    Routine Programming     As clinically indicated    Status 15     Every 15 minutes.    Suicide precautions: Suicide  Risk: MODERATE; Clinical rationale to override score: lack of access to a plan for self-harm     Order Specific Question:   Suicide Risk     Answer:   MODERATE     Order Specific Question:   Clinical rationale to override score:     Answer:   lack of access to a plan for self-harm       Risk Assessment:  Risk for harm is moderate.  Risk factors: SI, maladaptive coping, trauma, and past behaviors  Protective factors: engaged in treatment     SIO: Discontinued    Disposition: Pending stabilization, medication optimization, & development of a safe discharge plan. Pt has been accepted to [facility] program once stable.      Attestations      Sung Myers, MS3     Resident Attestation:   I was present with the medical student who participated in the service and in the documentation of the note.  I have verified the history and personally performed the physical exam, mental status exam, and medical decision making. I agree with the assessment and plan of care as documented in the note.     Yennifer Martinez MD  Psychiatry Resident Physician    Attestation:  This patient has been seen and evaluated by me, Jairo Choudhary MD.  I have discussed this patient with the house staff team including the resident and medical student and I agree with the findings and plan in this note.    I have reviewed today's vital signs, medications, labs and imaging. Jairo Choudhary MD , PhD.

## 2024-06-12 NOTE — PROCEDURES
"Procedures  [facility] [place]   ECT Procedure Note   06/12/2024    Randi Cleary is a 68 year old female patient.  [id number]    Patient Status: IN-patient    Is this the first in a series of 12 treatments?  No      Allergies   Allergen Reactions    Serotonin Reuptake Inhibitors (Ssris) Anxiety, Difficulty breathing, Headache, Palpitations and Shortness Of Breath    Buspirone      The patient states she had serotonin syndrome    Cephalexin      Other reaction(s): unknown rxn.    Desvenlafaxine      Serotonin syndrome    Diclofenac Sodium [Diclofenac]      Serotonin syndrome and restless legs syndrome    Gabapentin      Drove on the wrong side of the highway    Levofloxacin      \"CAN'T REMEMBER\"    Penicillins      \"SORES IN MOUTH\"    Riluzole Difficulty breathing and Swelling    Sulfa Antibiotics      \"PT DOES NOT KNOW WHAT THE REACTION WAS\"    Topiramate Other (See Comments)     Frequent urination       Weight:  193 lbs 6.4 oz / 87 kg          Indications for ECT:   Medications ineffective and Psychotherapies ineffective         Clinical Narrative:   HPI - The patient describes a lifelong history of depression dating back to elementary school, worsening after her father's death when she was 15yo and especially in the 1980s in the setting of worsening physical health (since falling and breaking her ankle), which led to the the initiation of fluoxetine, her first antidepressant.  Her history has been primarily characterized by low mood, with some ups and downs but no extended depression-free periods since then.  She has tried many different medications from different classes, but cannot recall any one being particularly helpful for her.  She has experienced past episodes of particularly irritable mood and concomitant decreased sleep need, although most of these have lasted 1-2 days and triggered by anger related to external stressors.  She has at least one episode of a more " "extended episode of elevated mood (and associated grandiosity, increased spending, flight of ideas, increased goal directed behaviors, pressured speech, and odd and embarrassing behavior), which occurred in the context of relapse on alcohol in 2021. She does not endorse any events before about age 48.     The patient's depression is characterized by near daily low mood, frequent anhedonia, with sleep difficulties (although c/b pain, KYAW, and RLS), fatigue, feelings of guilt/worthlessness, and impaired concentration.  She reports feelings of \"despair,\" at times with active SI with plan, but denies any intent to act on this - \"maybe it's hope.\"  She has 1 lifetime suicide attempt (overdose) in the 1980s, for which she was psychiatrically hospitalized.  She has a remote history of SIB (cutting) but not recently.       Psych pertinent item history includes includes suicide attempt , suicidal ideation, SIB , aggression, trauma hx, substance use: alcohol, cannabis, and Patient has a history of alcohol dependence treated 20+ years ago and she relapsed in 2020 for a couple of months, in her 20s she\" loved getting high\" on cocaine, LSD, mushrooms, speed, white cross, mutiple psychotropic trials , psych hosp, ketamine, and major medical problems.    Target Symptoms for Improvement: Amotivation, Fatigue, Improved self-image and Panic attacks         Diagnosis:   Major depression         Assessment:   #1 05/24/24 Some circumstantial thought, needs some redirection, 3.5 hours sleep last night, anxious, mood 1/10, PDW but no SI, never had ECT before - only TMS. No AVH.   #2 05/29/24  Mood 4/10, better over w/e, some word find difficulties, no AVH/SI/PDW. Chronic sciatica pain, neck and shoulder pain - didn't worsen with ECT. Mild headache.   #3 05/31/24  Mood 4/10, No SI, PDW, AVH, some wrist pain and headache, memory might be improving.    #4 6/3/24  Phq-9= 13, mood 3/10.  Yesterday was very tearful, still a bit today.  Last " "treatment had awareness under paralysis.  Otherwise, some initial confusion after first ECT but no subsequent cognitive effects.  Signed consent to continue.  #5 6/5/24 Mood 4-5/10  She feels like her \"rage\" is less and she feels lighter. but no cognitive side effects. She complains of excessive sweating and is concerned her thryoid is unbalanced. She is somatically focused She reports pain on the side of her neck radiating down to her chest. She had a migraine she thinks over the weekend which usual starts as occipital tension.  #6 6/7/24 mood 4-5/10. No SI. She does have some baseline long term memory difficulties no AVH.   #7 6/10/24  She still is worried that She is not able to go home. She had visitors this weekend who said \"you don't seem to have the weight of the world on your shoulders anymore\" she disagrees she had a downward spiral over the weekend when there was a mix up with her clothes and she usually feels tearful after ECT. She does not report anything feeling worse. She gets down on herself when she has an anxiety spiral and it was the 1st one she has had since admission.   #8 6/12/24 Winnie reported some tearfulness overnight. She is noticing a weight lifting mood 6/10 . Reports some difficulty with remembering names but believes this is at baseline.        Pause for the Cause:     Correct patient Yes   Correct procedure/laterality settings: Yes           Intra-Procedure Documentation:     ECT #: 8   Treatment number this series: 8   Total treatment number: 8     Type of ECT:  Right, unilateral ultrabrief    ECT Medications:    Toradol 30mg iv - for headache/myalgia     Brevital: 100 mg (incr from 90 mg as still awake)   Succinyl Choline: 90 mg    BP - nicardipine 1500 mcg         ECT Strip Summary: RUL Titration #3: 38.4 mC   Energy Level:  230.4 mC, 0.3 ms, 60 Hz, 8 sec, 800 mA     Motor Seizure Duration: 16 seconds  EEG Seizure Duration: 27 seconds  Increase to 9xST next time    Complications: " none  Plan:   - Continue RUL ECT - Q MWF    - Monitor depression severity with clinical assessment augmented with PHQ9 every other treatment  - Continue current medications    Discharge instructions:   - Continue acute ECT    - Per Dr Varela:   - Consider trial of serotonin modulator (e.g., vilazodone vs. vortioxetine) or novel agent Auvelity  - Consider augmentation with atypical antipsychotic (e.g., quetiapine or aripiprazole), though there are concerns given pre-diabetes      Toan Cotter MD  Dept of Psychiatry

## 2024-06-12 NOTE — PLAN OF CARE
"Pt came back from ECT at about 8:50 AM. She denied nausea, vomiting, or dizziness.   VSS. Pt was offered her breakfast tray and she was able to consume 100% of her meal. She was medication compliant. She endorsed neck pain at 5/10  and she was okay to wait a little bit for her pain medication as it is too soon to give. Pt was able to join OT dance movement therapy group and one other OT group. Pt was alert and oriented. She was able to easily recall names. She however showed some forgetfulness as she  repeated  a question she already asked this writer. Pt endorsed anxiety at 5/10, denied depression, hallucination and SI. Pt able to join groups this shift. She said that the first group was enjoyable. Groups had been able to distract her from pain.        Problem: Adult Behavioral Health Plan of Care  Goal: Patient-Specific Goal (Individualization)  Description: You can add care plan individualizations to a care plan. Examples of Individualization might be:  \"Parent requests to be called daily at 9am for status\", \"I have a hard time hearing out of my right ear\", or \"Do not touch me to wake me up as it startles  me\".  Outcome: Progressing  Goal: Adheres to Safety Considerations for Self and Others  Outcome: Progressing  Goal: Absence of New-Onset Illness or Injury  Outcome: Progressing  Goal: Optimized Coping Skills in Response to Life Stressors  Outcome: Progressing     Problem: Suicide Risk  Goal: Absence of Self-Harm  Outcome: Progressing     Problem: Depression  Goal: Improved Mood  Outcome: Progressing     Problem: Suicidal Behavior  Goal: Suicidal Behavior is Absent or Managed  Outcome: Progressing     Problem: Sleep Disturbance  Goal: Adequate Sleep/Rest  Outcome: Progressing     Problem: Fall Injury Risk  Goal: Absence of Fall and Fall-Related Injury  Outcome: Progressing     Problem: Psychotic Signs/Symptoms  Goal: Improved Behavioral Control (Psychotic Signs/Symptoms)  Outcome: Progressing     Problem: " Anxiety  Goal: Anxiety Reduction or Resolution  Outcome: Progressing   Goal Outcome Evaluation:

## 2024-06-12 NOTE — ANESTHESIA POSTPROCEDURE EVALUATION
Patient: Randi Cleary    Procedure: * No procedures listed *       Anesthesia Type:  General    Note:  Disposition: Outpatient   Postop Pain Control: Uneventful            Sign Out: Well controlled pain   PONV: No   Neuro/Psych: Uneventful            Sign Out: Acceptable/Baseline neuro status   Airway/Respiratory: Uneventful            Sign Out: Acceptable/Baseline resp. status   CV/Hemodynamics: Uneventful            Sign Out: Acceptable CV status; No obvious hypovolemia; No obvious fluid overload   Other NRE:    DID A NON-ROUTINE EVENT OCCUR?            Last vitals:  Vitals:    06/11/24 1613 06/12/24 0637 06/12/24 0808   BP: 135/79 131/82 134/63   Pulse: 72  71   Resp: 18 18 18   Temp: 36.3  C (97.4  F) 36.8  C (98.2  F) 36.3  C (97.3  F)   SpO2: 96% 96% 92%       Electronically Signed By: Carmen Saldana MD  June 12, 2024  8:19 AM

## 2024-06-12 NOTE — PROGRESS NOTES
Rehab Group    Start time: 1110  End time: 1155  Patient time total: 35 minutes (no charge)    attended partial group    #2 attended   Group Type: occupational therapy   Group Topic Covered: cognitive activities, healthy leisure time, and problem solving     Group Session Detail:  Visual-spatial group game     Patient Response/Contribution:  cooperative with task, attentive, and actively engaged     Patient Detail:    Pt actively participated in a structured occupational therapy group with a focus on visuospatial problem solving and social engagement via a group game. Pt demonstrated understanding of the novel 3-step task after an initial explanation. Pt remained focused and engaged x35 minutes, then was pulled out of group to meet with her treatment team (no charge).        No Charge    Patient Active Problem List   Diagnosis    DJD (degenerative joint disease), ankle and foot    Hereditary hemochromatosis (H24)    Pulmonary hypertension (H)    Postablative hypothyroidism    Hx of corticosteroid therapy    Class 2 obesity due to excess calories without serious comorbidity in adult    Prediabetes    KYAW (obstructive sleep apnea)    Type 2 diabetes mellitus without complication, without long-term current use of insulin (H)    Hypokalemia    COPD (chronic obstructive pulmonary disease) (H)    Generalized anxiety disorder    Restless leg syndrome    Vitamin B12 deficiency    Vitamin D deficiency    Morbid obesity (H)    Cord compression (H)    History of cervical spinal surgery    Cervical stenosis of spinal canal    Alcohol use disorder, moderate, in sustained remission (H)    Essential hypertension    Psoriatic arthropathy (H)    Primary osteoarthritis involving multiple joints    Gastroesophageal reflux disease with esophagitis without hemorrhage    Numbness in both hands    Chronic, continuous use of opioids    Trauma and stressor-related disorder    Post-menopausal    Suicidal ideation    Depression with anxiety     Severe recurrent major depression without psychotic features (H)

## 2024-06-12 NOTE — ANESTHESIA PREPROCEDURE EVALUATION
Anesthesia Pre-Procedure Evaluation    Patient: Randi Cleary   MRN: [id number] : 1955        Procedure : *ECT          Past Medical History:   Diagnosis Date    Bipolar 2 disorder (H)     Breast cancer (H)     lumpectomy, radiation, chemo    Chronic pain syndrome     COPD (chronic obstructive pulmonary disease) (H)     asthma    Cord compression (H) 2021    Dizzy     Drug tolerance     opioid    Esophageal reflux     Fatigue     Generalized anxiety disorder     Graves disease     Hemochromatosis 2018    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; H63D not detected    History of breast cancer 2020    Formatting of this note might be different from the original. Created by Conversion  Replacement Utility updated for latest IMO load Formatting of this note might be different from the original. Created by Conversion  Replacement Utility updated for latest IMO load    History of corticosteroid therapy 2019    History of partial adrenalectomy (H24) 2019    History of pheochromocytoma 2019    Hx antineoplastic chemotherapy     Hx of radiation therapy     Hyperlipidaemia     Hypertension     Impaired fasting glucose     Injury of neck, whiplash 07/15/2021    Joint pain     KYAW (obstructive sleep apnea) 2016    Osteopenia     Pheochromocytoma, left 2017    laparoscopically removed    Postablative hypothyroidism 1995    Prediabetes 10/03/2019    by A1c    Psoriasis     Psoriatic arthropathy (H)     Right rotator cuff tear     RLS (restless legs syndrome)     on ropinorole    Sacroiliitis (H24)     Serotonin syndrome 2020    [facility] - While on desvenlafaxine 100mg    Snoring     Spinal stenosis     Status post coronary angiogram 10/03/2019    Urinary incontinence     Vitamin B 12 deficiency 2009    Vitamin D deficiency 2010      Past Surgical History:   Procedure Laterality Date    ARTHRODESIS ANKLE      ARTHROPLASTY ANKLE Right 2015    Procedure: ARTHROPLASTY  ANKLE;  Surgeon: Jason Coughlin MD;  Location: [facility]    ARTHROPLASTY REVISION ANKLE Right 6/29/2015    Procedure: ARTHROPLASTY REVISION ANKLE;  Surgeon: Jason Coughlin MD;  Location: [facility]    BIOPSY BREAST      BREAST BIOPSY, CORE RT/LT      COLONOSCOPY      COLONOSCOPY N/A 2/25/2021    Procedure: COLONOSCOPY;  Surgeon: Guru Elke Tolbert MD;  Location:  GI    CV CORONARY ANGIOGRAM N/A 10/3/2019    Procedure: CV CORONARY ANGIOGRAM;  Surgeon: Bryce Pierre MD;  Location:  HEART CARDIAC CATH LAB    CV RIGHT HEART CATH MEASUREMENTS RECORDED N/A 10/3/2019    Procedure: CV RIGHT HEART CATH;  Surgeon: Bryce Pierre MD;  Location:  HEART CARDIAC CATH LAB    ESOPHAGOSCOPY, GASTROSCOPY, DUODENOSCOPY (EGD), COMBINED N/A 2/25/2021    Procedure: ESOPHAGOGASTRODUODENOSCOPY, WITH BIOPSY;  Surgeon: Guru Elke Tolbert MD;  Location:  GI    EYE SURGERY  2021    HC REMOVE TONSILS/ADENOIDS,<11 Y/O      Description: Tonsillectomy With Adenoidectomy;  Recorded: 04/07/2010;    IR LUMBAR EPIDURAL STEROID INJECTION  10/26/2004    IR LUMBAR EPIDURAL STEROID INJECTION  11/16/2004    IR LUMBAR EPIDURAL STEROID INJECTION  12/21/2004    IR LUMBAR EPIDURAL STEROID INJECTION  6/8/2006    JOINT REPLACEMENT      LAMINOPLASTY CERVICAL POSTERIOR THREE+ LEVELS Left 12/21/2021    Procedure: CERVICAL 3-CERVICAL 6 LEFT OPEN DOOR LAMINOPLASTY AND LEFT CERVICAL 4-5 AND CERVICAL 6-7 POSTERIOR FORAMINOTOMY;  Surgeon: Angela Gregory MD;  Location: [facility]    LAPAROSCOPIC ADRENALECTOMY Left 08/02/2017    pheochromocytoma    LAPAROSCOPIC ADRENALECTOMY Left 8/2/2017    Procedure: LAPAROSCOPIC LEFT ADRENALECTOMY, ;  Surgeon: Gab Linares MD;  [place] [facility] [place]  Service:     LENGTHEN TENDON ACHILLES Right 6/29/2015    Procedure: LENGTHEN TENDON ACHILLES;  Surgeon: Jason Coughlin MD;  Location: [facility]    LUMPECTOMY BREAST      LUMPECTOMY BREAST Left 1994  "   MAMMOPLASTY REDUCTION Right 2015    [place]    MAMMOPLASTY REDUCTION Right     approx late /    MASTECTOMY      left lumpectomy with axillary node dissection    MASTECTOMY MODIFIED RADICAL      OTHER SURGICAL HISTORY Right     reconstructive breast surgery    OTHER SURGICAL HISTORY      Adrenalectomy for pheochromocytoma    IL MASTECTOMY, MODIFIED RADICAL      Description: Modified Radical Mastectomy Left Breast;  Recorded: 2010;    REPAIR HAMMER TOE Right 2015    Procedure: REPAIR HAMMER TOE;  Surgeon: Jason Coughlin MD;  Location: [facility]    TONSILLECTOMY      TONSILLECTOMY & ADENOIDECTOMY      ZC ARTHRODESIS,ANKLE,OPEN Right     Description: Ankle Arthrodesis;  Recorded: 2010;      Allergies   Allergen Reactions    Serotonin Reuptake Inhibitors (Ssris) Anxiety, Difficulty breathing, Headache, Palpitations and Shortness Of Breath    Buspirone      The patient states she had serotonin syndrome    Cephalexin      Other reaction(s): unknown rxn.    Desvenlafaxine      Serotonin syndrome    Diclofenac Sodium [Diclofenac]      Serotonin syndrome and restless legs syndrome    Gabapentin      [place]rove on the wrong side of the highway    Levofloxacin      \"CAN'T REMEMBER\"    Penicillins      \"SORES IN MOUTH\"    Riluzole Difficulty breathing and Swelling    Sulfa Antibiotics      \"PT DOES NOT KNOW WHAT THE REACTION WAS\"    Topiramate Other (See Comments)     Frequent urination      Social History     Tobacco Use    Smoking status: Former     Current packs/day: 0.00     Average packs/day: 2.5 packs/day for 29.2 years (72.9 ttl pk-yrs)     Types: Cigarettes     Start date: 1971     Quit date: 2000     Years since quittin.9     Passive exposure: Past    Smokeless tobacco: Never   Substance Use Topics    Alcohol use: Not Currently     Comment: relapse 2021 sober       Wt Readings from Last 1 Encounters:   24 87.7 kg (193 lb 6.4 oz)        Anesthesia Evaluation   Pt " has had prior anesthetic.     No history of anesthetic complications       ROS/MED HX  ENT/Pulmonary:     (+) sleep apnea, uses CPAP,                        COPD,              Neurologic:       Cardiovascular: Comment: Pulmonary HTN.  Followed by Cardiology.  Next right heart cath scheduled for 6/19/2024.    (+)  hypertension- -   -  - -                                 Previous cardiac testing   Echo: Date: Results:    Stress Test:  Date: Results:    ECG Reviewed:  Date: 5/21/2024 Results:  RBBB, sinus rhythm  Cath:  Date: Results:      METS/Exercise Tolerance:     Hematologic:       Musculoskeletal: Comment: S/P Cervical Surgery - laminectomy   SIJ pain & joint pain  Osteopenia      GI/Hepatic: Comment: Hepatic steatosis    (+) GERD,                   Renal/Genitourinary:       Endo: Comment: Graves Disease. S/P Radioiodine Tx.    Hx of pheochromocytoma, S/P Left Adrenalectomy 8/2017    (+)          thyroid problem,     Obesity,       Psychiatric/Substance Use: Comment: MDD, recurrent, severe, with anxious distress      Infectious Disease:       Malignancy:   (+) Malignancy, History of Breast.Breast CA Remission status post Surgery and Chemo.      Other:            Physical Exam    Airway  airway exam normal      Mallampati: II   TM distance: > 3 FB   Neck ROM: full   Mouth opening: > 3 cm    Respiratory Devices and Support         Dental       (+) Minor Abnormalities - some fillings, tiny chips      Cardiovascular   cardiovascular exam normal          Pulmonary   pulmonary exam normal            Other findings: S/P Cervical Surgery, good ROM  OUTSIDE LABS:  CBC:   Lab Results   Component Value Date    WBC 7.9 06/04/2024    WBC 7.1 05/22/2024    HGB 17.0 (H) 06/04/2024    HGB 17.7 (H) 05/22/2024    HCT 48.1 (H) 06/04/2024    HCT 49.9 (H) 05/22/2024     06/04/2024     05/22/2024     BMP:   Lab Results   Component Value Date     06/04/2024     05/22/2024    POTASSIUM 4.3 06/04/2024     POTASSIUM 5.2 06/04/2024    CHLORIDE 103 06/04/2024    CHLORIDE 104 05/22/2024    CO2 21 (L) 06/04/2024    CO2 24 05/22/2024    BUN 17.3 06/04/2024    BUN 9.3 05/22/2024    CR 0.53 06/04/2024    CR 0.57 05/22/2024    GLC 96 06/04/2024     (H) 05/22/2024     COAGS:   Lab Results   Component Value Date    PTT 34 12/13/2021    INR 0.94 12/13/2021     POC:   Lab Results   Component Value Date     (H) 02/25/2021     HEPATIC:   Lab Results   Component Value Date    ALBUMIN 3.7 06/04/2024    PROTTOTAL 7.2 06/04/2024    ALT 33 06/04/2024    AST 35 06/04/2024    ALKPHOS 80 06/04/2024    BILITOTAL 0.4 06/04/2024     OTHER:   Lab Results   Component Value Date    A1C 5.6 05/22/2024    LINDA 9.5 06/04/2024    MAG 1.9 05/22/2024    TSH 1.69 06/04/2024    T4 1.49 03/29/2024    T3 114 01/18/2023    CRP <2.9 11/16/2021    SED 8 10/17/2023       Anesthesia Plan    ASA Status:  3    NPO Status:  NPO Appropriate    Anesthesia Type: General.     - Airway: Mask Only   Induction: Intravenous.   Maintenance: N/A.        Consents    Anesthesia Plan(s) and associated risks, benefits, and realistic alternatives discussed. Questions answered and patient/representative(s) expressed understanding.     - Discussed: Risks, Benefits and Alternatives for BOTH SEDATION and the PROCEDURE were discussed     - Discussed with:  Patient      - Extended Intubation/Ventilatory Support Discussed: No.      - Patient is DNR/DNI Status: No     Use of blood products discussed: No .     Postoperative Care            Comments:    Other Comments: ECT           Carmen Saldana MD    I have reviewed the pertinent notes and labs in the chart from the past 30 days and (re)examined the patient.  Any updates or changes from those notes are reflected in this note.

## 2024-06-12 NOTE — PLAN OF CARE
Problem: Sleep Disturbance  Goal: Adequate Sleep/Rest  Outcome: Progressing   Goal Outcome Evaluation:  Patient was observed to have slept for 6.5 hours Pain comfortably managed with scheduled Oxycodone along with prn Tylenol x1 this morning for a mild headache. Patient otherwise endorsed no other acute concerns. NPO starting at midnight in anticipation for ECT this morning(previously  set for 1140 AM but ECT staff stated this has been rescheduled for 0700 AM). Safety checks in place.

## 2024-06-12 NOTE — ANESTHESIA CARE TRANSFER NOTE
Patient: Randi Cleary    Procedure: * No procedures listed *       Diagnosis: * No pre-op diagnosis entered *  Diagnosis Additional Information: No value filed.    Anesthesia Type:   General     Note:    Oropharynx: spontaneously breathing  Level of Consciousness: drowsy  Oxygen Supplementation: room air    Independent Airway: airway patency satisfactory and stable  Dentition: dentition unchanged  Vital Signs Stable: post-procedure vital signs reviewed and stable    Patient transferred to: PACU    Handoff Report: Identifed the Patient, Identified the Reponsible Provider, Reviewed the pertinent medical history, Discussed the surgical course, Reviewed Intra-OP anesthesia mangement and issues during anesthesia, Set expectations for post-procedure period and Allowed opportunity for questions and acknowledgement of understanding      Vitals:  Vitals Value Taken Time   /63 06/12/24 0808   Temp 36.3  C (97.3  F) 06/12/24 0808   Pulse 71 06/12/24 0808   Resp 18 06/12/24 0808   SpO2 92 % 06/12/24 0808       Electronically Signed By: Carmen Saldana MD  June 12, 2024  8:19 AM

## 2024-06-12 NOTE — PLAN OF CARE
Problem: Adult Inpatient Plan of Care  Goal: Optimal Comfort and Wellbeing  Intervention: Monitor Pain and Promote Comfort  Recent Flowsheet Documentation  Taken 6/12/2024 1843 by Yahir Zamora, RN  Pain Management Interventions: medication (see MAR)  Intervention: Provide Person-Centered Care  Recent Flowsheet Documentation  Taken 6/12/2024 1843 by Yahir Zamora, RN  Trust Relationship/Rapport:   care explained   choices provided   emotional support provided   empathic listening provided   questions answered   questions encouraged   reassurance provided   thoughts/feelings acknowledged     Problem: Suicide Risk  Goal: Absence of Self-Harm  Outcome: Progressing     Problem: Depression  Goal: Improved Mood  Outcome: Progressing     Problem: Suicidal Behavior  Goal: Suicidal Behavior is Absent or Managed  Outcome: Progressing   Goal Outcome Evaluation:    Plan of Care Reviewed With: patient         Alert and oriented, able to communicate needs. Patient was visible in milieu. Interactive with peers and staff members. Pleasant, cooperative and medication compliant.  Anxiety 7, endorsed depression but unable to rate. She denied suicidal ideation, contracted for safety. ADLs WNL, 7/10 neck, bilateral shoulders and back pain, pain managed with scheduled pain medication.  Food and fluid intake WNL. Attended/participated in group activity

## 2024-06-12 NOTE — PROGRESS NOTES
Patient adequate for discharge . Report called to unit nurse Nelsy VASQUEZ       . Iv removed and hemostasis achieved less than 2 min.  Patient safely transported back to unit with  staff persons.

## 2024-06-12 NOTE — PROGRESS NOTES
Rehab Group    Start time: 1325  End time: 1425  Patient time total: 60 minutes    attended full group    #5 attended   Group Type: occupational therapy   Group Topic Covered: coping skills     Group Session Detail:  OT clinic     Patient Response/Contribution:  cooperative with task, organized, attentive, and actively engaged     Patient Detail:    Pt actively participated in occupational therapy clinic to facilitate coping skill exploration, creative expression within personally meaningful activities, and clinical observation of social, cognitive, and kinesthetic performance skills. Pt response: Independent to initiate, gather materials, sequence, and adjust to workspace demands as needed. Demonstrated good focus, planning, and attention to detail for selected creative expression task. Organized with task materials, and observed working at a slow and careful pace. Able to ask for assistance as needed, and socialized with peers and staff. Appeared motivated to complete selected task and shared that she is looking forward to continuing her project tomorrow. Calm, pleasant, and engaged. Affect appeared to brighten in interactions.         Train & Education Service Per Session 45 + Minutes () OT Group code    Patient Active Problem List   Diagnosis    DJD (degenerative joint disease), ankle and foot    Hereditary hemochromatosis (H24)    Pulmonary hypertension (H)    Postablative hypothyroidism    Hx of corticosteroid therapy    Class 2 obesity due to excess calories without serious comorbidity in adult    Prediabetes    KYAW (obstructive sleep apnea)    Type 2 diabetes mellitus without complication, without long-term current use of insulin (H)    Hypokalemia    COPD (chronic obstructive pulmonary disease) (H)    Generalized anxiety disorder    Restless leg syndrome    Vitamin B12 deficiency    Vitamin D deficiency    Morbid obesity (H)    Cord compression (H)    History of cervical spinal surgery    Cervical  stenosis of spinal canal    Alcohol use disorder, moderate, in sustained remission (H)    Essential hypertension    Psoriatic arthropathy (H)    Primary osteoarthritis involving multiple joints    Gastroesophageal reflux disease with esophagitis without hemorrhage    Numbness in both hands    Chronic, continuous use of opioids    Trauma and stressor-related disorder    Post-menopausal    Suicidal ideation    Depression with anxiety    Severe recurrent major depression without psychotic features (H)

## 2024-06-13 PROCEDURE — 99232 SBSQ HOSP IP/OBS MODERATE 35: CPT | Mod: GC | Performed by: PSYCHIATRY & NEUROLOGY

## 2024-06-13 PROCEDURE — 250N000013 HC RX MED GY IP 250 OP 250 PS 637

## 2024-06-13 PROCEDURE — 250N000013 HC RX MED GY IP 250 OP 250 PS 637: Performed by: PSYCHIATRY & NEUROLOGY

## 2024-06-13 PROCEDURE — 124N000002 HC R&B MH UMMC

## 2024-06-13 PROCEDURE — 250N000013 HC RX MED GY IP 250 OP 250 PS 637: Performed by: PHYSICIAN ASSISTANT

## 2024-06-13 PROCEDURE — G0177 OPPS/PHP; TRAIN & EDUC SERV: HCPCS

## 2024-06-13 RX ADMIN — OXYCODONE HYDROCHLORIDE 5 MG: 5 TABLET ORAL at 06:04

## 2024-06-13 RX ADMIN — ROPINIROLE HYDROCHLORIDE 2 MG: 2 TABLET, FILM COATED ORAL at 23:14

## 2024-06-13 RX ADMIN — UMECLIDINIUM 1 PUFF: 62.5 AEROSOL, POWDER ORAL at 08:13

## 2024-06-13 RX ADMIN — OXYCODONE HYDROCHLORIDE 5 MG: 5 TABLET ORAL at 13:05

## 2024-06-13 RX ADMIN — ROPINIROLE HYDROCHLORIDE 2 MG: 2 TABLET, FILM COATED ORAL at 21:15

## 2024-06-13 RX ADMIN — LEVOTHYROXINE SODIUM 150 MCG: 75 TABLET ORAL at 06:04

## 2024-06-13 RX ADMIN — ALLOPURINOL 300 MG: 300 TABLET ORAL at 21:15

## 2024-06-13 RX ADMIN — ROPINIROLE HYDROCHLORIDE 2 MG: 2 TABLET, FILM COATED ORAL at 14:32

## 2024-06-13 RX ADMIN — Medication 500 MCG: at 08:16

## 2024-06-13 RX ADMIN — OXYCODONE HYDROCHLORIDE 5 MG: 5 TABLET ORAL at 17:57

## 2024-06-13 RX ADMIN — FLUTICASONE FUROATE AND VILANTEROL TRIFENATATE 1 PUFF: 100; 25 POWDER RESPIRATORY (INHALATION) at 08:13

## 2024-06-13 RX ADMIN — MAGNESIUM OXIDE TAB 400 MG (241.3 MG ELEMENTAL MG) 400 MG: 400 (241.3 MG) TAB at 21:15

## 2024-06-13 RX ADMIN — Medication 1000 MG: at 08:16

## 2024-06-13 RX ADMIN — GUAIFENESIN 600 MG: 600 TABLET ORAL at 21:15

## 2024-06-13 RX ADMIN — Medication 12.5 MG: at 08:16

## 2024-06-13 RX ADMIN — POTASSIUM CHLORIDE 20 MEQ: 1500 TABLET, EXTENDED RELEASE ORAL at 08:15

## 2024-06-13 RX ADMIN — OXYCODONE HYDROCHLORIDE 5 MG: 5 TABLET ORAL at 21:07

## 2024-06-13 RX ADMIN — GABAPENTIN 100 MG: 100 CAPSULE ORAL at 14:32

## 2024-06-13 RX ADMIN — ACETAMINOPHEN 650 MG: 325 TABLET, FILM COATED ORAL at 19:20

## 2024-06-13 RX ADMIN — PANTOPRAZOLE SODIUM 40 MG: 40 TABLET, DELAYED RELEASE ORAL at 08:16

## 2024-06-13 RX ADMIN — GUAIFENESIN 600 MG: 600 TABLET ORAL at 08:15

## 2024-06-13 RX ADMIN — Medication 3 MG: at 23:14

## 2024-06-13 RX ADMIN — MAGNESIUM OXIDE TAB 400 MG (241.3 MG ELEMENTAL MG) 400 MG: 400 (241.3 MG) TAB at 08:16

## 2024-06-13 ASSESSMENT — ACTIVITIES OF DAILY LIVING (ADL)
ADLS_ACUITY_SCORE: 41
HYGIENE/GROOMING: INDEPENDENT;HANDWASHING
ADLS_ACUITY_SCORE: 41
DRESS: STREET CLOTHES;INDEPENDENT
ADLS_ACUITY_SCORE: 41
ORAL_HYGIENE: INDEPENDENT
ADLS_ACUITY_SCORE: 41

## 2024-06-13 NOTE — PROVIDER NOTIFICATION
06/13/24 4516   Individualization/Patient Specific Goals   Patient Personal Strengths appropriate judgment/decision-making;expressive of emotions;expressive of needs;independent living skills;intellectual cognitive skills;interests/hobbies;motivated for recovery;medication/treatment adherence;positive vocational history;resilient;resourceful;socioeconomic stability;stable living environment   Patient Vulnerabilities family/relationship conflict;history of unsuccessful treatment;poor impulse control;traumatic event   Anxieties, Fears or Concerns Many somatic concerns   Individualized Care Needs Chonic pain   Interprofessional Rounds   Summary 1. Stabilization of mood disorder symptoms 2. Safe with self 3. Medication mgmt per MD's 4. Medically cleared for ECT 5. Coordination of care with outpatient providers 6. Psych f/u care in place   Participants CTC;patient;psychiatrist;nursing   Behavioral Team Discussion   Participants Dr. Choudhary, Winsome Solano RN, Machelle White, [facility], Med students   Progress Patient has undergone ECT x8 thus far. Mood continues to improve. Patient has been social /engaging on the unit.   Anticipated length of stay 5-7 days   Continued Stay Criteria/Rationale Severity of depression -  initiation of ECT.   has been working and unable to transport for outpatient ECT- possibly able to next week   Medical/Physical Chronic pain, S/P Breast cancer   Precautions Per unit protocol   Plan She will be seen by Psychiatry daily.  Meds will be reviewed/adjusted per MD's. ECT in progress.  Care will be coordinated with outpatient providers. Patient will return home when stable/safe.  CTC will continue to assess needs- ensure appropriate f/u care is in place   Rationale for change in precautions or plan No change in plan/precautions   Safety Plan Patient to complete   Anticipated Discharge Disposition home with family     PRECAUTIONS AND SAFETY    Behavioral Orders   Procedures    Code 1 - Restrict  to Unit    Code 2 - 1:1 Staff Supervision     For coming down to ECT only    Discontinue 1:1 attendant for suicide risk     Order Specific Question:   I have performed an in person assessment of the patient     Answer:   Based on this assessment the patient no longer requires a one on one attendant at this point in time.     Order Specific Question:   Rationale     Answer:   Patient States able to remain safe in hospital     Order Specific Question:   Rationale     Answer:   Modifications to care environment made to mitigate safety risk     Order Specific Question:   Rationale     Answer:   Routine observations are sufficient to monitor safety.    Electroconvulsive therapy     Series of up to 12 treatments. Begin Date: 5/24/24     Treating Psychiatrist providing ECT:  Ruthann     Notified on:  5/21/24    Electroconvulsive therapy     Series of up to 12 treatments. Begin Date: 5/24/24     Treating Psychiatrist providing ECT:  Ruthann     Notified on:  5/21/24    Fall precautions    Routine Programming     As clinically indicated    Status 15     Every 15 minutes.    Suicide precautions: Suicide Risk: MODERATE; Clinical rationale to override score: lack of access to a plan for self-harm     Order Specific Question:   Suicide Risk     Answer:   MODERATE     Order Specific Question:   Clinical rationale to override score:     Answer:   lack of access to a plan for self-harm       Safety  Safety WDL: WDL  Patient Location: dining room, group room, lounge, patient room, own  Observed Behavior: calm, sitting  Observed Behavior (Comment): calm  Airway Safety Measures: all equipment/monitors on and audible  Safety Measures: environmental rounds completed, safety rounds completed  Diversional Activity: television  Suicidality: Status 15

## 2024-06-13 NOTE — PROGRESS NOTES
Pt participated in dance/movement therapy (D/MT) with predominantly verbal psychotherapeutic processes and reflections on how various interventions impact mental health symptoms.  Pt was then able to join a somatic meditation that allows for practice shifting perspective so this is more possible during times of greater struggle.  For example, patient shared she is having a significant amount of conflict with an evening shift nurse.  Pt is struggling with the mental health issues she came to the hospital for in addition to some anticipatory anxiety all day before this nurse arrives on the unit.  If this is a personality conflict, pt could benefit from a team change on the evening shift.  If this isn't possible, this somatic meditation allows a means for pt to address her own mental health during social engagements that are more challenging for her.       06/12/24 1015   Expressive Therapy   Therapy Type dance/movement   Minutes of Treatment 55

## 2024-06-13 NOTE — PLAN OF CARE
"Pt presented with a bright affect. She is pleasant and cooperative, medication compliant. Pt took shower  this shift, able to join OT group. Pt endorsed anxiety, denies SI , depression and hallucinations. No behavioral concerns this shift.     Problem: Adult Behavioral Health Plan of Care  Goal: Patient-Specific Goal (Individualization)  Description: You can add care plan individualizations to a care plan. Examples of Individualization might be:  \"Parent requests to be called daily at 9am for status\", \"I have a hard time hearing out of my right ear\", or \"Do not touch me to wake me up as it startles  me\".  Outcome: Progressing  Goal: Adheres to Safety Considerations for Self and Others  Outcome: Progressing  Goal: Absence of New-Onset Illness or Injury  Outcome: Progressing   Goal Outcome Evaluation:    Plan of Care Reviewed With: patient                   "

## 2024-06-13 NOTE — PROGRESS NOTES
"  ----------------------------------------------------------------------------------------------------------  [facility]  Psychiatry Progress Note  Hospital Day #23     Interim History:     The patient's care was discussed with the treatment team and chart notes were reviewed.  Sleep: 6.25 hours (06/13/24 0600)  Staff Report:  Slept well, no acute events overnight. Went to several group sessions yesterday. Ate, drank, and slept well. Looks bright.  Medications: Took as prescribed.  PRN: No psychiatric PRNs.    Last 24H PRN:     melatonin tablet 3 mg, 3 mg at 06/12/24 3023       Subjective:     Patient interview:   Winnie was interviewed in the conference room. Says she woke up on the right side of the bed today. Was sleep deprived because she was anxious at home about everything. Says she has been having some teary episodes. Is teary currently - doesn't really know how to put into words. She says maybe being able to think clearer and the differences in herself. She is aware of how she was feeling. She talked about remebering going to different doctors and pain clinics and was dismissed by the staff while she was teary.   Discussed OT group yesterday. She says \"us girls just dont appreciate\" and trailed off and talked about another patient bothering her. Said she and others go to OT groups and really want to be there and these interactions make it uncomfortable. She says she is here for a reason and tries to tune it out.   She has been talking to two staff members and they have been helping her with the issue.   She says she thinks she has been finding herself here, who she is. It has also helped Sidney to have a bit of space away from being a caregiver. Talked about how much she loves her cat. Would like to take advantage of ECT as much as she can while she is here she said.   Discussed people on the unit noticing she is brighter, her cognition is improved, she smiles more. " "She says \"it seems lighter.      ROS:  Patient has pain in her great toe     Objective:     Vitals:  /71 (BP Location: Right arm, Patient Position: Sitting, Cuff Size: Adult Large)   Pulse 98   Temp 98.2  F (36.8  C)   Resp 16   Ht 1.676 m (5' 6\")   Wt 87.5 kg (193 lb)   LMP  (LMP Unknown)   SpO2 93%   BMI 31.15 kg/m      Allergies:  Allergies   Allergen Reactions    Serotonin Reuptake Inhibitors (Ssris) Anxiety, Difficulty breathing, Headache, Palpitations and Shortness Of Breath    Buspirone      The patient states she had serotonin syndrome    Cephalexin      Other reaction(s): unknown rxn.    Desvenlafaxine      Serotonin syndrome    Diclofenac Sodium [Diclofenac]      Serotonin syndrome and restless legs syndrome    Gabapentin      Drove on the wrong side of the highway    Levofloxacin      \"CAN'T REMEMBER\"    Penicillins      \"SORES IN MOUTH\"    Riluzole Difficulty breathing and Swelling    Sulfa Antibiotics      \"PT DOES NOT KNOW WHAT THE REACTION WAS\"    Topiramate Other (See Comments)     Frequent urination       Current Medications:  Scheduled:  Current Facility-Administered Medications   Medication Dose Route Frequency Provider Last Rate Last Admin    acetaminophen (TYLENOL) tablet 650 mg  650 mg Oral Q4H PRN Wilner Parker MD   650 mg at 06/12/24 0617    albuterol (PROVENTIL HFA/VENTOLIN HFA) inhaler  2 puff Inhalation Q6H PRN Wilner Parker MD   2 puff at 05/31/24 1741    allopurinol (ZYLOPRIM) tablet 300 mg  300 mg Oral QPM Nallely Barber MD   300 mg at 06/12/24 2110    alum & mag hydroxide-simethicone (MAALOX) suspension 30 mL  30 mL Oral Q4H PRN Wilner Parker MD        ascorbic acid tablet 1,000 mg  1,000 mg Oral Daily Wilner Parker MD   1,000 mg at 06/13/24 0816    benzocaine-menthol (CHLORASEPTIC) 6-10 MG lozenge 1 lozenge  1 lozenge Buccal Q1H PRN Erin Ferrera MD        benzonatate (TESSALON) capsule 200 mg  200 mg Oral TID PRN Wilner Parker MD   200 mg at " 06/03/24 2153    cholecalciferol (VITAMIN D3) capsule 250 mcg  250 mcg Oral Weekly Wilner Parker MD   250 mcg at 06/12/24 0916    cyanocobalamin (VITAMIN B-12) sublingual tablet 500 mcg  500 mcg Sublingual Daily Wilner Parker MD   500 mcg at 06/13/24 0816    ethacrynic acid (EDECRIN) half-tab 12.5 mg  12.5 mg Oral Daily Wilner Parker MD   12.5 mg at 06/13/24 0816    fluticasone-vilanterol (BREO ELLIPTA) 100-25 MCG/ACT inhaler 1 puff  1 puff Inhalation Daily Wilner Parker MD   1 puff at 06/13/24 0813    And    umeclidinium (INCRUSE ELLIPTA) 62.5 MCG/ACT inhaler 1 puff  1 puff Inhalation Daily Wilner Parker MD   1 puff at 06/13/24 0813    gabapentin (NEURONTIN) capsule 100 mg  100 mg Oral Q6H PRN Wilner Parker MD   100 mg at 06/06/24 1442    gabapentin (NEURONTIN) capsule 400 mg  400 mg Oral At Bedtime Erin Ferrera MD   400 mg at 06/12/24 2303    guaiFENesin (MUCINEX) 12 hr tablet 1,200 mg  1,200 mg Oral BID PRN Wilner Parker MD        guaiFENesin (MUCINEX) 12 hr tablet 600 mg  600 mg Oral BID Jamila Jason MD   600 mg at 06/13/24 0815    HOLD MEDICATION   Does not apply HOLD Alvina Garg DO        Hold Medications for ECT (select and list medications to be held)   Does not apply HOLD Tejinder Correa MD        Hold Medications for ECT (select and list medications to be held)   Does not apply HOLD Boo Riley MD        ibuprofen (ADVIL/MOTRIN) tablet 400 mg  400 mg Oral Q6H PRN Alvina Garg DO   400 mg at 06/11/24 1417    Lidocaine (LIDOCARE) 4 % Patch 1 patch  1 patch Transdermal Q24H Lulu Zacarias MD   1 patch at 06/08/24 1114    magnesium oxide (MAG-OX) tablet 400 mg  400 mg Oral BID Anna Brewer PA-C   400 mg at 06/13/24 0816    melatonin tablet 3 mg  3 mg Oral At Bedtime PRN Wilner Parker MD   3 mg at 06/12/24 2303    menthol (Topical Analgesic) 2.5% (BENGAY VANISHING SCENT) 2.5 % topical gel   Topical Q6H PRN Anna Brewer PA-C    Given at 05/31/24 1744    naloxone (NARCAN) injection 0.2 mg  0.2 mg Intravenous Q2 Min PRN Wilner Parker MD        Or    naloxone (NARCAN) injection 0.4 mg  0.4 mg Intravenous Q2 Min PRN Wilner Parker MD        Or    naloxone (NARCAN) injection 0.2 mg  0.2 mg Intramuscular Q2 Min PRN Wilner Parker MD        Or    naloxone (NARCAN) injection 0.4 mg  0.4 mg Intramuscular Q2 Min PRN Wilner Parker MD        OLANZapine (zyPREXA) tablet 2.5 mg  2.5 mg Oral TID PRN Wilner Parker MD        Or    OLANZapine (zyPREXA) injection 2.5 mg  2.5 mg Intramuscular TID PRN Wilner Parker MD        ondansetron (ZOFRAN ODT) ODT tab 4 mg  4 mg Oral Q6H PRN Wilner Parker MD        oxyCODONE (ROXICODONE) tablet 5 mg  5 mg Oral Q6H Jairo Choudhary MD   5 mg at 06/13/24 1305    And    oxyCODONE (ROXICODONE) tablet 5 mg  5 mg Oral Daily PRN Jairo Choudhary MD   5 mg at 06/11/24 0054    pantoprazole (PROTONIX) EC tablet 40 mg  40 mg Oral Daily Wilner Parker MD   40 mg at 06/13/24 0816    polyethylene glycol (MIRALAX) Packet 17 g  17 g Oral Daily PRN Wilner Parker MD   17 g at 06/09/24 0958    potassium chloride jeannette ER (KLOR-CON M20) CR tablet 20 mEq  20 mEq Oral Daily Wilner Parker MD   20 mEq at 06/13/24 0815    rOPINIRole (REQUIP) tablet 2 mg  2 mg Oral TID Nallely Barber MD   2 mg at 06/12/24 2304    sodium chloride (OCEAN) 0.65 % nasal spray 1 spray  1 spray Both Nostrils Q1H PRN Anna Brewer PA-C        SYNTHROID (Brand only - 75 mcg strength tablets)  150 mcg Oral Once per day on Monday Tuesday Wednesday Thursday Friday Saturday Tejinder Correa MD   150 mcg at 06/13/24 0604    And    SYNTHROID tablet 75 mcg (brand only)  75 mcg Oral Every Sunday Tejinder Correa MD   75 mcg at 06/09/24 0606    zolpidem (AMBIEN) tablet 5 mg  5 mg Oral At Bedtime PRN Wilner Parker MD   5 mg at 05/24/24 0056       PRN:  Current Facility-Administered Medications   Medication Dose Route  Frequency Provider Last Rate Last Admin    acetaminophen (TYLENOL) tablet 650 mg  650 mg Oral Q4H PRN Wilner Parker MD   650 mg at 06/12/24 0617    albuterol (PROVENTIL HFA/VENTOLIN HFA) inhaler  2 puff Inhalation Q6H PRN Wilner Parker MD   2 puff at 05/31/24 1741    allopurinol (ZYLOPRIM) tablet 300 mg  300 mg Oral QPM Nallely Barber MD   300 mg at 06/12/24 2110    alum & mag hydroxide-simethicone (MAALOX) suspension 30 mL  30 mL Oral Q4H PRN Wilner Parker MD        ascorbic acid tablet 1,000 mg  1,000 mg Oral Daily Wilner Parker MD   1,000 mg at 06/13/24 0816    benzocaine-menthol (CHLORASEPTIC) 6-10 MG lozenge 1 lozenge  1 lozenge Buccal Q1H PRN Erin Ferrera MD        benzonatate (TESSALON) capsule 200 mg  200 mg Oral TID PRN Wilner Parker MD   200 mg at 06/03/24 2153    cholecalciferol (VITAMIN D3) capsule 250 mcg  250 mcg Oral Weekly Wilner Parker MD   250 mcg at 06/12/24 0916    cyanocobalamin (VITAMIN B-12) sublingual tablet 500 mcg  500 mcg Sublingual Daily Wilner Parker MD   500 mcg at 06/13/24 0816    ethacrynic acid (EDECRIN) half-tab 12.5 mg  12.5 mg Oral Daily Wilner Parker MD   12.5 mg at 06/13/24 0816    fluticasone-vilanterol (BREO ELLIPTA) 100-25 MCG/ACT inhaler 1 puff  1 puff Inhalation Daily Wilner Parker MD   1 puff at 06/13/24 0813    And    umeclidinium (INCRUSE ELLIPTA) 62.5 MCG/ACT inhaler 1 puff  1 puff Inhalation Daily Wilner Parker MD   1 puff at 06/13/24 0813    gabapentin (NEURONTIN) capsule 100 mg  100 mg Oral Q6H PRN Wilner Parker MD   100 mg at 06/06/24 1442    gabapentin (NEURONTIN) capsule 400 mg  400 mg Oral At Bedtime Erin Ferrera MD   400 mg at 06/12/24 2303    guaiFENesin (MUCINEX) 12 hr tablet 1,200 mg  1,200 mg Oral BID PRN Wilner Parker MD        guaiFENesin (MUCINEX) 12 hr tablet 600 mg  600 mg Oral BID Jamila Jason MD   600 mg at 06/13/24 0815    HOLD MEDICATION   Does not apply HOLD Alvina Garg,  DO        Hold Medications for ECT (select and list medications to be held)   Does not apply HOLD Tejinder Correa MD        Hold Medications for ECT (select and list medications to be held)   Does not apply HOLD Boo Riley MD        ibuprofen (ADVIL/MOTRIN) tablet 400 mg  400 mg Oral Q6H PRN Alvina Garg, DO   400 mg at 06/11/24 1417    Lidocaine (LIDOCARE) 4 % Patch 1 patch  1 patch Transdermal Q24H Lulu Zacarias MD   1 patch at 06/08/24 1114    magnesium oxide (MAG-OX) tablet 400 mg  400 mg Oral BID Anna Brewer PA-C   400 mg at 06/13/24 0816    melatonin tablet 3 mg  3 mg Oral At Bedtime PRN Wilner Parker MD   3 mg at 06/12/24 2303    menthol (Topical Analgesic) 2.5% (BENGAY VANISHING SCENT) 2.5 % topical gel   Topical Q6H PRN Anna Brewer PA-C   Given at 05/31/24 1744    naloxone (NARCAN) injection 0.2 mg  0.2 mg Intravenous Q2 Min PRN Wilner Parker MD        Or    naloxone (NARCAN) injection 0.4 mg  0.4 mg Intravenous Q2 Min PRN Wilner Parker MD        Or    naloxone (NARCAN) injection 0.2 mg  0.2 mg Intramuscular Q2 Min PRN Wilner Parker MD        Or    naloxone (NARCAN) injection 0.4 mg  0.4 mg Intramuscular Q2 Min PRN Wilner Parker MD        OLANZapine (zyPREXA) tablet 2.5 mg  2.5 mg Oral TID PRN Wilner Parker MD        Or    OLANZapine (zyPREXA) injection 2.5 mg  2.5 mg Intramuscular TID PRN Wilner Parker MD        ondansetron (ZOFRAN ODT) ODT tab 4 mg  4 mg Oral Q6H PRN Wilner Parker MD        oxyCODONE (ROXICODONE) tablet 5 mg  5 mg Oral Q6H Jairo Choudhary MD   5 mg at 06/13/24 1305    And    oxyCODONE (ROXICODONE) tablet 5 mg  5 mg Oral Daily PRN Jairo Choudhary MD   5 mg at 06/11/24 0054    pantoprazole (PROTONIX) EC tablet 40 mg  40 mg Oral Daily Wilner Parker MD   40 mg at 06/13/24 0816    polyethylene glycol (MIRALAX) Packet 17 g  17 g Oral Daily PRN Wilner Parker MD   17 g at 06/09/24 0958    potassium chloride jeannette  ER (KLOR-CON M20) CR tablet 20 mEq  20 mEq Oral Daily Wilner Parker MD   20 mEq at 06/13/24 0815    rOPINIRole (REQUIP) tablet 2 mg  2 mg Oral TID Nallely Barber MD   2 mg at 06/12/24 2304    sodium chloride (OCEAN) 0.65 % nasal spray 1 spray  1 spray Both Nostrils Q1H PRN Anna Brewer PA-C        SYNTHROID (Brand only - 75 mcg strength tablets)  150 mcg Oral Once per day on Monday Tuesday Wednesday Thursday Friday Saturday Tejinder Correa MD   150 mcg at 06/13/24 0604    And    SYNTHROID tablet 75 mcg (brand only)  75 mcg Oral Every Sunday Tejinder Correa MD   75 mcg at 06/09/24 0606    zolpidem (AMBIEN) tablet 5 mg  5 mg Oral At Bedtime PRN Wilner Parker MD   5 mg at 05/24/24 0056       Labs and Imaging:  New results:   No results found for this or any previous visit (from the past 24 hour(s)).    Data this admission:  - CBC elevated Hgb 17.7  - CMP unremarkable  - TSH normal  - UDS THC positive  - Hgb A1c normal  - Lipids LDL mildly elevated 103 but otherwise unremarkable  - Vitamin B12 unremarkable  - Folate unremarkable  - Vitamin D unremarkable  - Urinalysis unremarkable  - EKG normal sinus rhythm, RBBB QTc 458    Potential Drug Interactions:  Per [employer], combinations of the following drugs has a rating of D, demonstrating that the medications may interact with each other in a clinically significant manner and a patient-specific assessment was made and determined that the benefits outweighed the risks.   - Gabapentin, oxycodone, and Magnesium oxide The following risks include CNS depression.   - Gabapentin and levothyroxine. The following risks include magnesium salts decreasing serum concentration of levothyroxine.     Per [employer], combinations of the following drugs has a rating of C demonstrating that agents may interact in a clinically significant manner but the benefits of the combination of medications often outweigh the risk.   - Ropinirole, oxycodone, and gabapentin. The following  "risks include CNS depression.   - allopurinol and ethacrynic acid. The following risks include ethacrynic acid increasing concentration of allopurinol.  - ethacrynic acid and oxycodone.  The following risks include oxycodone may enhanced the toxic effects of ethacrynic acid  Treatment team will monitor  for potential side effects and re-evaluate as needed.           Mental Status Exam:     Oriented to:  Grossly Oriented  General:  Awake and Alert  Appearance:  appears stated age and Grooming is adequate  Behavior/Attitude:  cooperative and open conversational  Eye Contact:  appropriate  Psychomotor: normal and no evidence of tics, dystonia, or tardive dyskinesia no catatonia present  Speech:  appropriate volume/tone and talkative  Language: Fluent in English with appropriate syntax and vocabulary.  Mood:  \"good\"  Affect:  congruent with mood, highly variable depending on subject, sometimes tearful, smiling  Thought Process:  coherent, goal directed, and circumstantial  Thought Content:  No HI, No VH, and No AH, no SI; No apparent delusions, seems to struggle remembering names and word finding difficulties   Associations:  intact  Insight:  fair due to recognizing the circumstances affecting her mental health, has a very positive attitude towards her treatment  Judgment:  fair due to willingness to engage in ECT  Impulse control: good  Attention Span:  grossly intact  Concentration:  grossly intact  Recent and Remote Memory:  SLUMS 26/30 with mild cognitive decline   Fund of Knowledge:  average  Muscle Strength and Tone: normal  Gait and Station: Normal     Psychiatric Assessment     Randi Cleary is a 68 year old female previously diagnosed with MDD, recurrent severe, substance induced mood disorder, Unspecified Trauma, CHAVO, borderline personality disorder and alcohol use disorder who presented voluntarily with SI in the context of treatment resistant MDD. On admission she presented with significant symptoms of " depression incuding emotional lability, low mood, hopelessness, amotivation, anxiety, anhedonia, low energy, insomnia low appetite, excess guilt and poor concentration. Her affect was dysphroic and anxious at times with heavy perseveration on medico-surgical dignosises and passive SI causing incresed anxiety. She notes she has had a plan but no intent. Her history of extensive substance use, medical history and chronic pain contribute biologically. Her presentation is further complicated by borderline personality disorder and history of trauma. Additionally, she has a very limited support system, interpersonal conflicts with friends and her spouse. However her last hospitalization was in the 1980's for a suicide attempt via prozac overdose and has been engaged in treatment and present voluntarily. Her presentation is consistent with decompensated Major Depressive disorder, recurrent severe. Her disorder has been treatment resistant including to TMS and at this time, ECT would be the best course of action to address her symptom severity. Patient warrants inpatient psychiatric hospitalization to maintain her safety.     Psychiatric Plan by Diagnosis      Today's changes:  - ECT #9 tomorrow  - SLUMS examination performed, score 26/30     # MDD, recurrent, severe, with anxious distress  - Patient suffers from treatment resistant depression and has trialed many medications. At this point treatment team will need to do a chart review to review medication trials and determine the best medication to address depressive symptoms long term  - Pt started ECT 5/24 : Hold high-dose gabapentin/pregabalin after 6pm on the day before ECT (to permit adequate seizure)   - Continue ECT MWF   - 6/13 SLUMS score 26/30 indicating mild cognitive decline     #Insomnia  - Zolpidem 5mg qpm prn    Pertinent Labs/Monitoring:   - EKG 5/22 - Qtc 458 NSR, RBBB     Additional Plans:  - Patient will be treated in therapeutic milieu with appropriate  individual and group therapies as described     Psychiatric Hospital Course:      Randi Cleary was admitted to [facility] as a voluntary patient for electroconvulsive therapy for lifelong history of depression. Multiple medication trials were not effective. Symptoms notable for low mood, anhedonia, sleep difficulties, guilt and worthlessness, suicidal ideation with plan without intent.   She appears to be improving, though she still has reservations about being ready for discharge. She has stated that she feels ECT is helping, especially with her anger, so she seems to moving in the right direction. It is reassuring that she has been attending group sessions and is enjoying regular visits with her , who is likely who she will be living with once she gets discharged.   She has completed 8 ECT sessions thus far and would like to complete the full treatment of 12 sessions. She has found that ECT has been helpful and her mood and thoughts have improved.   6/13 -Pt had a SLUMS score 26/30 indicating mild cognitive decline. She had some word findings difficulties.   The risks, benefits, alternatives, and side effects were discussed and understood by the patient.     Medical Assessment and Plan     Medical diagnoses to be addressed this admission:    # Hypothyroidism  - PTA 150mcg M-Sa, 75mcg on Perez     # KYAW   - Doesn't use CPAP at home, sleep study on 06/2024      # RLS  - Continue PTA Ropinirole 2 mg PO TID  - Gabapentin 400mg qpm  - Magnesium Citrated changed to Magnesium Oxide 400mg BID (per patient and medicine)    #RBBB:  Previously on other EKGs and stable    #Cervical radiculopathy and myelopathy s/p cervical laminoplasty 12/2021    # Chronic Pain  - Continue PTA Roxicodone 5 mg q6h + 5 mg PRN (for max daily dose of 25 mg)  - Menthol 2.5% topical gel q6hrs prn  - TENS Unit     #Dizziness  #Headache  Reports hx of migraines, no vision changes or hearing changes. notes this has been ongoing since she had  her spinal surgery.   - CT head 5/22 unremarkable    #Gout  - Nutrition consult  - Allopurinol 300mg qpm  - Flares in left podagra but none currently    #Vitamin B12 Deficiency  - Vit B12 500mcg qday    #Vitamin C Deficiency  - Ascorbic Acid 1000mg    #Vitamin D Deficiency  - 250mcg weekly    #COPD  - Breo Ellipta 1 puff daily   - Incruse Ellipta 1 puff daily  - Tessalon cap 200mg TID prn  - Albuterol 2 puffs q6hr prn  - Mucinex 1200mg BID prn     #GERD  #Hiatal Hernia  - Protonix 40mg daily    #Possible pulmonary HTN  #HTN  Per Medicine note, pt follows with Cardiology here and has scheduled right heart catheterization for 06/12/2024. Has follow up with Cardiology in clinic scheduled for 06/19/2024. PTA edecrin 12.5mg daily and potassium supplement. Electrolytes and renal function stable.Recently lost 50lbs and BP has improved.   - Ethacrynic Acid 12.5mg qday   -Edecrin 12.5mg daily   -Continue potassium supplement   - Klorcon 20meq daily     #Hemochromatosis, hereditary  - Hgb Stable at ~17    #Hepatic Steatosis  - Stable  - LFTs wnl, no abdominal pain, distention, N/V    #History of breast cancer s/p mastectomy, chemo and XRT:   - currently in remission      #Alcohol use disorder, in remission:   - No current use. Two years sober     #Hx of pheochromocytoma s/p left adrenalectomy 08/2017:   - Stable  - Follows Endocrinology for this.     #Psoriasis:  - Noted on R hang  - Uses scalp brush    Medical course: Patient was physically examined by the ED prior to being transferred to the unit and was found to be medically stable and appropriate for admission. Medicine consult was done to provide clearance for ECT. Due to patient's persistent neck pain after surgery in 2021, CT Head was done which was negative. Oxycodone changed from q6hr prn to scheduled with an extra 5mg in the day as needed per PTA regimen. Patient continued to express some nerve pain so gabapentin was increased to 400mg.   Pt had a cardiology apt  outpt that she needed to move before discharge.     Consults: Dietician, Medicine for ECT Clearance, ECT Consult, Nutrition     Checklist     Legal Status: Voluntary     Safety Assessment:   Behavioral Orders   Procedures    Code 1 - Restrict to Unit    Code 2 - 1:1 Staff Supervision     For coming down to ECT only    Discontinue 1:1 attendant for suicide risk     Order Specific Question:   I have performed an in person assessment of the patient     Answer:   Based on this assessment the patient no longer requires a one on one attendant at this point in time.     Order Specific Question:   Rationale     Answer:   Patient States able to remain safe in hospital     Order Specific Question:   Rationale     Answer:   Modifications to care environment made to mitigate safety risk     Order Specific Question:   Rationale     Answer:   Routine observations are sufficient to monitor safety.    Electroconvulsive therapy     Series of up to 12 treatments. Begin Date: 5/24/24     Treating Psychiatrist providing ECT:  Ruthann     Notified on:  5/21/24    Electroconvulsive therapy     Series of up to 12 treatments. Begin Date: 5/24/24     Treating Psychiatrist providing ECT:  Ruthann     Notified on:  5/21/24    Fall precautions    Routine Programming     As clinically indicated    Status 15     Every 15 minutes.    Suicide precautions: Suicide Risk: MODERATE; Clinical rationale to override score: lack of access to a plan for self-harm     Order Specific Question:   Suicide Risk     Answer:   MODERATE     Order Specific Question:   Clinical rationale to override score:     Answer:   lack of access to a plan for self-harm       Risk Assessment:  Risk for harm is low.  Risk factors: SI, maladaptive coping, trauma, and past behaviors  Protective factors: engaged in treatment     SIO: Discontinued    Disposition: Pending stabilization, medication optimization, & development of a safe discharge plan. Pt has been accepted to 55 plus  program once stable. Likely discharge is at end of ECT course, which is scheduled to be Friday, June 21.     Attestations      Sung Myers, MS3     Resident Attestation:   I was present with the medical student who participated in the service and in the documentation of the note.  I have verified the history and personally performed the physical exam, mental status exam, and medical decision making. I agree with the assessment and plan of care as documented in the note.     Yennifer Martinez MD  Psychiatry Resident Physician    Attestation:  This patient has been seen and evaluated by me, Jairo Choudhary MD.  I have discussed this patient with the house staff team including the resident and medical student and I agree with the findings and plan in this note.    I have reviewed today's vital signs, medications, labs and imaging. Jairo Choudhary MD , PhD.

## 2024-06-13 NOTE — PROGRESS NOTES
Rehab Group    Start time: 1015  End time: 1150  Patient time total: 95 minutes    attended full group    #5 attended   Group Type: OT Clinic   Group Topic Covered: activity therapy, coping skills, and problem solving       Group Session Detail:  OT Clinic     Patient Response/Contribution:  cooperative with task, organized, socially appropriately, safe use of materials/supplies, and actively engaged       Patient Detail:    Pt actively participated in occupational therapy clinic to facilitate coping skill exploration, creative expression within personally meaningful activities, and clinical observation of social, cognitive, and kinesthetic performance skills. Pt response: Needed min assist to initiate, gather materials, sequence, and adjust to workspace demands as needed. Demonstrated good focus, planning, and problem solving for selected sun catcher task. Able to ask for assistance as needed, and approprietly social with peers and staff.  Pt will continue to be encouraged to attend groups for further asssesssment and to address goals identified on plan of care.         Train & Education Service Per Session 45 + Minutes () OT Group code    Patient Active Problem List   Diagnosis    DJD (degenerative joint disease), ankle and foot    Hereditary hemochromatosis (H24)    Pulmonary hypertension (H)    Postablative hypothyroidism    Hx of corticosteroid therapy    Class 2 obesity due to excess calories without serious comorbidity in adult    Prediabetes    KYAW (obstructive sleep apnea)    Type 2 diabetes mellitus without complication, without long-term current use of insulin (H)    Hypokalemia    COPD (chronic obstructive pulmonary disease) (H)    Generalized anxiety disorder    Restless leg syndrome    Vitamin B12 deficiency    Vitamin D deficiency    Morbid obesity (H)    Cord compression (H)    History of cervical spinal surgery    Cervical stenosis of spinal canal    Alcohol use disorder, moderate, in  sustained remission (H)    Essential hypertension    Psoriatic arthropathy (H)    Primary osteoarthritis involving multiple joints    Gastroesophageal reflux disease with esophagitis without hemorrhage    Numbness in both hands    Chronic, continuous use of opioids    Trauma and stressor-related disorder    Post-menopausal    Suicidal ideation    Depression with anxiety    Severe recurrent major depression without psychotic features (H)

## 2024-06-13 NOTE — PLAN OF CARE
No PRNs given or requested this shift; pt remained in her room the entire shift. Pain controlled with the use of scheduled Oxycodone. Safety checks completed every 15 minutes;  No concerns noted/reported. Pt appears to have slept for 6.25 hours; will continue to monitor and offer support.     Problem: Sleep Disturbance  Goal: Adequate Sleep/Rest  Outcome: Progressing   Goal Outcome Evaluation:

## 2024-06-13 NOTE — PLAN OF CARE
BEH IP Unit Acuity Rating Score (UARS)  Patient is given one point for every criteria they meet.    CRITERIA SCORING   On a 72 hour hold, court hold, committed, stay of commitment, or revocation. 0    Patient LOS on BEH unit exceeds 20 days. 1  LOS: 23   Patient under guardianship, 55+, otherwise medically complex, or under age 11. 1   Suicide ideation without relief of precipitating factors. 0   Current plan for suicide. 0   Current plan for homicide. 0   Imminent risk or actual attempt to seriously harm another without relief of factors precipitating the attempt. 0   Severe dysfunction in daily living (ex: complete neglect for self care, extreme disruption in vegetative function, extreme deterioration in social interactions). 1   Recent (last 7 days) or current physical aggression in the ED or on unit. 0   Restraints or seclusion episode in past 72 hours. 0   Recent (last 7 days) or current verbal aggression, agitation, yelling, etc., while in the ED or unit. 0   Active psychosis. 0   Need for constant or near constant redirection (from leaving, from others, etc).  0   Intrusive or disruptive behaviors. 0   Patient requires 3 or more hours of individualized nursing care per 8-hour shift (i.e. for ADLs, meds, therapeutic interventions). 0   TOTAL 3

## 2024-06-13 NOTE — PLAN OF CARE
Problem: Adult Behavioral Health Plan of Care  Goal: Adheres to Safety Considerations for Self and Others  Outcome: Progressing  Intervention: Develop and Maintain Individualized Safety Plan  Recent Flowsheet Documentation  Taken 6/13/2024 1825 by Yahir Zamora, RN  Safety Measures:   environmental rounds completed   safety rounds completed     Problem: Adult Behavioral Health Plan of Care  Goal: Absence of New-Onset Illness or Injury  Intervention: Identify and Manage Fall Risk  Recent Flowsheet Documentation  Taken 6/13/2024 1825 by Yahir Zamora RN  Safety Measures:   environmental rounds completed   safety rounds completed     Problem: Suicidal Behavior  Goal: Suicidal Behavior is Absent or Managed  Outcome: Progressing   Goal Outcome Evaluation:    Plan of Care Reviewed With: patient         Alert and oriented, able to communicate needs. Patient was visible in milieu, she spent time interacting/playing cards with peers in dinning room, able to interact with staff members. Pleasant, cooperative and medication compliant.  Endorsed anxiety 5, denied depression, she denied suicidal ideation. ADLs WNL, moderate pain to bilateral shoulders, back and neck, Tylenol given for severe headache at 1920, PRN Oxycodone at 2100 for neck pain. Food and fluid intake WNL. Aware of ECT tomorrow at 1120 AM        Behavior concerns:   Medical issues:

## 2024-06-14 ENCOUNTER — APPOINTMENT (OUTPATIENT)
Dept: BEHAVIORAL HEALTH | Facility: CLINIC | Age: 69
DRG: 881 | End: 2024-06-14
Payer: MEDICARE

## 2024-06-14 ENCOUNTER — PATIENT OUTREACH (OUTPATIENT)
Dept: CARE COORDINATION | Facility: CLINIC | Age: 69
End: 2024-06-14
Payer: MEDICARE

## 2024-06-14 ENCOUNTER — ANESTHESIA (OUTPATIENT)
Dept: BEHAVIORAL HEALTH | Facility: CLINIC | Age: 69
DRG: 881 | End: 2024-06-14
Payer: MEDICARE

## 2024-06-14 ENCOUNTER — ANESTHESIA EVENT (OUTPATIENT)
Dept: BEHAVIORAL HEALTH | Facility: CLINIC | Age: 69
DRG: 881 | End: 2024-06-14
Payer: MEDICARE

## 2024-06-14 PROCEDURE — 90870 ELECTROCONVULSIVE THERAPY: CPT | Performed by: STUDENT IN AN ORGANIZED HEALTH CARE EDUCATION/TRAINING PROGRAM

## 2024-06-14 PROCEDURE — 250N000011 HC RX IP 250 OP 636: Mod: JZ | Performed by: PSYCHIATRY & NEUROLOGY

## 2024-06-14 PROCEDURE — 250N000013 HC RX MED GY IP 250 OP 250 PS 637

## 2024-06-14 PROCEDURE — 250N000011 HC RX IP 250 OP 636: Performed by: STUDENT IN AN ORGANIZED HEALTH CARE EDUCATION/TRAINING PROGRAM

## 2024-06-14 PROCEDURE — 99232 SBSQ HOSP IP/OBS MODERATE 35: CPT | Mod: GC | Performed by: STUDENT IN AN ORGANIZED HEALTH CARE EDUCATION/TRAINING PROGRAM

## 2024-06-14 PROCEDURE — 90870 ELECTROCONVULSIVE THERAPY: CPT

## 2024-06-14 PROCEDURE — 250N000013 HC RX MED GY IP 250 OP 250 PS 637: Performed by: PHYSICIAN ASSISTANT

## 2024-06-14 PROCEDURE — 250N000013 HC RX MED GY IP 250 OP 250 PS 637: Performed by: PSYCHIATRY & NEUROLOGY

## 2024-06-14 PROCEDURE — 124N000002 HC R&B MH UMMC

## 2024-06-14 PROCEDURE — 90870 ELECTROCONVULSIVE THERAPY: CPT | Performed by: PSYCHIATRY & NEUROLOGY

## 2024-06-14 PROCEDURE — 250N000009 HC RX 250: Performed by: STUDENT IN AN ORGANIZED HEALTH CARE EDUCATION/TRAINING PROGRAM

## 2024-06-14 PROCEDURE — 370N000017 HC ANESTHESIA TECHNICAL FEE, PER MIN: Performed by: STUDENT IN AN ORGANIZED HEALTH CARE EDUCATION/TRAINING PROGRAM

## 2024-06-14 RX ORDER — KETOROLAC TROMETHAMINE 30 MG/ML
30 INJECTION, SOLUTION INTRAMUSCULAR; INTRAVENOUS ONCE
Status: DISCONTINUED | OUTPATIENT
Start: 2024-06-14 | End: 2024-06-14

## 2024-06-14 RX ORDER — NALOXONE HYDROCHLORIDE 0.4 MG/ML
0.1 INJECTION, SOLUTION INTRAMUSCULAR; INTRAVENOUS; SUBCUTANEOUS
Status: DISCONTINUED | OUTPATIENT
Start: 2024-06-14 | End: 2024-06-14

## 2024-06-14 RX ORDER — LABETALOL HYDROCHLORIDE 5 MG/ML
INJECTION, SOLUTION INTRAVENOUS PRN
Status: DISCONTINUED | OUTPATIENT
Start: 2024-06-14 | End: 2024-06-14

## 2024-06-14 RX ORDER — ONDANSETRON 4 MG/1
4 TABLET, ORALLY DISINTEGRATING ORAL EVERY 30 MIN PRN
Status: DISCONTINUED | OUTPATIENT
Start: 2024-06-14 | End: 2024-06-14

## 2024-06-14 RX ORDER — OXYCODONE HYDROCHLORIDE 5 MG/1
5 TABLET ORAL
Status: DISCONTINUED | OUTPATIENT
Start: 2024-06-14 | End: 2024-06-14

## 2024-06-14 RX ORDER — METHOHEXITAL IN WATER/PF 100MG/10ML
SYRINGE (ML) INTRAVENOUS PRN
Status: DISCONTINUED | OUTPATIENT
Start: 2024-06-14 | End: 2024-06-14

## 2024-06-14 RX ORDER — SODIUM CHLORIDE, SODIUM LACTATE, POTASSIUM CHLORIDE, CALCIUM CHLORIDE 600; 310; 30; 20 MG/100ML; MG/100ML; MG/100ML; MG/100ML
INJECTION, SOLUTION INTRAVENOUS CONTINUOUS
Status: DISCONTINUED | OUTPATIENT
Start: 2024-06-14 | End: 2024-06-14

## 2024-06-14 RX ORDER — ONDANSETRON 2 MG/ML
4 INJECTION INTRAMUSCULAR; INTRAVENOUS EVERY 30 MIN PRN
Status: DISCONTINUED | OUTPATIENT
Start: 2024-06-14 | End: 2024-06-14

## 2024-06-14 RX ORDER — NICARDIPINE HCL-0.9% SOD CHLOR 1 MG/10 ML
SYRINGE (ML) INTRAVENOUS PRN
Status: DISCONTINUED | OUTPATIENT
Start: 2024-06-14 | End: 2024-06-14

## 2024-06-14 RX ADMIN — OXYCODONE HYDROCHLORIDE 5 MG: 5 TABLET ORAL at 06:19

## 2024-06-14 RX ADMIN — MAGNESIUM OXIDE TAB 400 MG (241.3 MG ELEMENTAL MG) 400 MG: 400 (241.3 MG) TAB at 20:54

## 2024-06-14 RX ADMIN — Medication 100 MG: at 12:23

## 2024-06-14 RX ADMIN — ROPINIROLE HYDROCHLORIDE 2 MG: 2 TABLET, FILM COATED ORAL at 23:06

## 2024-06-14 RX ADMIN — Medication 3 MG: at 23:06

## 2024-06-14 RX ADMIN — ACETAMINOPHEN 650 MG: 325 TABLET, FILM COATED ORAL at 08:56

## 2024-06-14 RX ADMIN — OXYCODONE HYDROCHLORIDE 5 MG: 5 TABLET ORAL at 17:58

## 2024-06-14 RX ADMIN — Medication 1000 MG: at 13:50

## 2024-06-14 RX ADMIN — MAGNESIUM OXIDE TAB 400 MG (241.3 MG ELEMENTAL MG) 400 MG: 400 (241.3 MG) TAB at 13:51

## 2024-06-14 RX ADMIN — Medication 500 MCG: at 13:50

## 2024-06-14 RX ADMIN — Medication 12.5 MG: at 13:50

## 2024-06-14 RX ADMIN — FLUTICASONE FUROATE AND VILANTEROL TRIFENATATE 1 PUFF: 100; 25 POWDER RESPIRATORY (INHALATION) at 08:56

## 2024-06-14 RX ADMIN — OXYCODONE HYDROCHLORIDE 5 MG: 5 TABLET ORAL at 13:51

## 2024-06-14 RX ADMIN — KETOROLAC TROMETHAMINE 30 MG: 30 INJECTION, SOLUTION INTRAMUSCULAR at 12:11

## 2024-06-14 RX ADMIN — LABETALOL HYDROCHLORIDE 20 MG: 5 INJECTION, SOLUTION INTRAVENOUS at 12:21

## 2024-06-14 RX ADMIN — POTASSIUM CHLORIDE 20 MEQ: 1500 TABLET, EXTENDED RELEASE ORAL at 13:51

## 2024-06-14 RX ADMIN — GABAPENTIN 100 MG: 100 CAPSULE ORAL at 14:31

## 2024-06-14 RX ADMIN — PANTOPRAZOLE SODIUM 40 MG: 40 TABLET, DELAYED RELEASE ORAL at 08:56

## 2024-06-14 RX ADMIN — Medication 500 MCG: at 12:21

## 2024-06-14 RX ADMIN — ALLOPURINOL 300 MG: 300 TABLET ORAL at 20:54

## 2024-06-14 RX ADMIN — SUCCINYLCHOLINE CHLORIDE 90 MG: 20 INJECTION, SOLUTION INTRAMUSCULAR; INTRAVENOUS; PARENTERAL at 12:23

## 2024-06-14 RX ADMIN — UMECLIDINIUM 1 PUFF: 62.5 AEROSOL, POWDER ORAL at 08:56

## 2024-06-14 RX ADMIN — LEVOTHYROXINE SODIUM 150 MCG: 75 TABLET ORAL at 06:19

## 2024-06-14 RX ADMIN — OXYCODONE HYDROCHLORIDE 5 MG: 5 TABLET ORAL at 23:06

## 2024-06-14 RX ADMIN — GUAIFENESIN 600 MG: 600 TABLET ORAL at 20:54

## 2024-06-14 RX ADMIN — GABAPENTIN 400 MG: 400 CAPSULE ORAL at 23:05

## 2024-06-14 RX ADMIN — OXYCODONE HYDROCHLORIDE 5 MG: 5 TABLET ORAL at 01:00

## 2024-06-14 RX ADMIN — ROPINIROLE HYDROCHLORIDE 2 MG: 2 TABLET, FILM COATED ORAL at 20:54

## 2024-06-14 RX ADMIN — GUAIFENESIN 600 MG: 600 TABLET ORAL at 13:57

## 2024-06-14 RX ADMIN — ROPINIROLE HYDROCHLORIDE 2 MG: 2 TABLET, FILM COATED ORAL at 13:55

## 2024-06-14 ASSESSMENT — ACTIVITIES OF DAILY LIVING (ADL)
ADLS_ACUITY_SCORE: 41
ORAL_HYGIENE: INDEPENDENT
ADLS_ACUITY_SCORE: 41
LAUNDRY: WITH SUPERVISION
ADLS_ACUITY_SCORE: 41
ADLS_ACUITY_SCORE: 41
DRESS: INDEPENDENT
ADLS_ACUITY_SCORE: 41
HYGIENE/GROOMING: INDEPENDENT
ADLS_ACUITY_SCORE: 41
HYGIENE/GROOMING: HANDWASHING;INDEPENDENT
DRESS: STREET CLOTHES;INDEPENDENT
ADLS_ACUITY_SCORE: 41
ORAL_HYGIENE: INDEPENDENT
ADLS_ACUITY_SCORE: 41

## 2024-06-14 ASSESSMENT — COPD QUESTIONNAIRES: COPD: 1

## 2024-06-14 NOTE — PROGRESS NOTES
Clinic Care Coordination Contact  Ambulatory Care Coordination to Inpatient Care Management   Hand-In Communication    Date:  June 14, 2024  Name: Randi Cleary is enrolled in Ambulatory Care Coordination program and I am the Lead Care Coordinator.  CC Contact Information: Epic InBasket + phone Christopher Koenig, RN   [phone]  Payor Source: Payor: MEDICARE / Plan: MEDICARE / Product Type: Medicare /   Current services in place:     Please see the CC Snaphot and Care Management Flowsheets for specific  details of this Randi Cleary care plan.   Additional details/specific concerns r/t this admission:    No additional concerns at this time .    I will follow this admission in Epic. Please feel free to contact me with questions or for further collaboration in discharge planning.

## 2024-06-14 NOTE — ANESTHESIA POSTPROCEDURE EVALUATION
Patient: Randi Cleary    Procedure: * No procedures listed *       Anesthesia Type:  General    Note:  Disposition: Inpatient   Postop Pain Control: Uneventful            Sign Out: Well controlled pain   PONV: No   Neuro/Psych: Uneventful            Sign Out: Acceptable/Baseline neuro status   Airway/Respiratory: Uneventful            Sign Out: Acceptable/Baseline resp. status   CV/Hemodynamics: Uneventful            Sign Out: Acceptable CV status; No obvious hypovolemia; No obvious fluid overload   Other NRE: NONE   DID A NON-ROUTINE EVENT OCCUR? No           Last vitals:  Vitals:    06/14/24 1233 06/14/24 1245 06/14/24 1300   BP: (!) 143/72 (!) 123/91 130/65   Pulse: 79 76 88   Resp: 18 22 20   Temp: 36.4  C (97.5  F) 36.7  C (98.1  F) 36.8  C (98.2  F)   SpO2: 96% 93% 94%       Electronically Signed By: Huyen Ledesma MD  June 14, 2024  1:37 PM

## 2024-06-14 NOTE — PROCEDURES
"Procedures  [facility], [place]   ECT Procedure Note   06/14/2024    Randi Cleary is a 68 year old female patient.  [id number]    Patient Status: IN-patient    Is this the first in a series of 12 treatments?  No      Allergies   Allergen Reactions    Serotonin Reuptake Inhibitors (Ssris) Anxiety, Difficulty breathing, Headache, Palpitations and Shortness Of Breath    Buspirone      The patient states she had serotonin syndrome    Cephalexin      Other reaction(s): unknown rxn.    Desvenlafaxine      Serotonin syndrome    Diclofenac Sodium [Diclofenac]      Serotonin syndrome and restless legs syndrome    Gabapentin      Drove on the wrong side of the highway    Levofloxacin      \"CAN'T REMEMBER\"    Penicillins      \"SORES IN MOUTH\"    Riluzole Difficulty breathing and Swelling    Sulfa Antibiotics      \"PT DOES NOT KNOW WHAT THE REACTION WAS\"    Topiramate Other (See Comments)     Frequent urination       Weight:  193 lbs 0 oz / 87 kg          Indications for ECT:   Medications ineffective and Psychotherapies ineffective         Clinical Narrative:   HPI - The patient describes a lifelong history of depression dating back to elementary school, worsening after her father's death when she was 15yo and especially in the 1980s in the setting of worsening physical health (since falling and breaking her ankle), which led to the the initiation of fluoxetine, her first antidepressant.  Her history has been primarily characterized by low mood, with some ups and downs but no extended depression-free periods since then.  She has tried many different medications from different classes, but cannot recall any one being particularly helpful for her.  She has experienced past episodes of particularly irritable mood and concomitant decreased sleep need, although most of these have lasted 1-2 days and triggered by anger related to external stressors.  She has at least one episode of a more extended " "episode of elevated mood (and associated grandiosity, increased spending, flight of ideas, increased goal directed behaviors, pressured speech, and odd and embarrassing behavior), which occurred in the context of relapse on alcohol in 2021. She does not endorse any events before about age 48.     The patient's depression is characterized by near daily low mood, frequent anhedonia, with sleep difficulties (although c/b pain, KYAW, and RLS), fatigue, feelings of guilt/worthlessness, and impaired concentration.  She reports feelings of \"despair,\" at times with active SI with plan, but denies any intent to act on this - \"maybe it's hope.\"  She has 1 lifetime suicide attempt (overdose) in the 1980s, for which she was psychiatrically hospitalized.  She has a remote history of SIB (cutting) but not recently.       Psych pertinent item history includes includes suicide attempt , suicidal ideation, SIB , aggression, trauma hx, substance use: alcohol, cannabis, and Patient has a history of alcohol dependence treated 20+ years ago and she relapsed in 2020 for a couple of months, in her 20s she\" loved getting high\" on cocaine, LSD, mushrooms, speed, white cross, mutiple psychotropic trials , psych hosp, ketamine, and major medical problems.    Target Symptoms for Improvement: Amotivation, Fatigue, Improved self-image and Panic attacks         Diagnosis:   Major depression         Assessment:   #1 05/24/24 Some circumstantial thought, needs some redirection, 3.5 hours sleep last night, anxious, mood 1/10, PDW but no SI, never had ECT before - only TMS. No AVH.   #2 05/29/24  Mood 4/10, better over w/e, some word find difficulties, no AVH/SI/PDW. Chronic sciatica pain, neck and shoulder pain - didn't worsen with ECT. Mild headache.   #3 05/31/24  Mood 4/10, No SI, PDW, AVH, some wrist pain and headache, memory might be improving.    #4 6/3/24  Phq-9= 13, mood 3/10.  Yesterday was very tearful, still a bit today.  Last treatment " "had awareness under paralysis.  Otherwise, some initial confusion after first ECT but no subsequent cognitive effects.  Signed consent to continue.  #5 6/5/24 Mood 4-5/10  She feels like her \"rage\" is less and she feels lighter. but no cognitive side effects. She complains of excessive sweating and is concerned her thryoid is unbalanced. She is somatically focused She reports pain on the side of her neck radiating down to her chest. She had a migraine she thinks over the weekend which usual starts as occipital tension.  #6 6/7/24 mood 4-5/10. No SI. She does have some baseline long term memory difficulties no AVH.   #7 6/10/24  She still is worried that She is not able to go home. She had visitors this weekend who said \"you don't seem to have the weight of the world on your shoulders anymore\" she disagrees she had a downward spiral over the weekend when there was a mix up with her clothes and she usually feels tearful after ECT. She does not report anything feeling worse. She gets down on herself when she has an anxiety spiral and it was the 1st one she has had since admission.   #8 6/12/24 Winnie reported some tearfulness overnight. She is noticing a weight lifting mood 6/10 . Reports some difficulty with remembering names but believes this is at baseline.   #9 6/14/24 Mood 5/10 (10 best) She feels like she is improving each time . She still has occasional crying spells but she feels she bounces back quicker. She is still anxious about going home. No Si. Tolerating ECT       Pause for the Cause:     Correct patient Yes   Correct procedure/laterality settings: Yes           Intra-Procedure Documentation:     ECT #: 9   Treatment number this series: 9   Total treatment number: 9     Type of ECT:  Right, unilateral ultrabrief    ECT Medications:    Toradol 30mg iv - for headache/myalgia     Brevital: 100 mg (incr from 90 mg as still awake)   Succinyl Choline: 90 mg    BP - nicardipine 1500 mcg         ECT Strip Summary: " RUL Titration #3: 38.4 mC   Energy Level:  345mC, 0.3 ms, 90 Hz, 8 sec, 800 mA     Motor Seizure Duration: 16 seconds  EEG Seizure Duration: 23 seconds     Complications: none  Plan:   - Continue RUL ECT - Q MWF    - Monitor depression severity with clinical assessment augmented with PHQ9 every other treatment  - Continue current medications    Discharge instructions:   - Continue acute ECT    - Per Dr Varela:   - Consider trial of serotonin modulator (e.g., vilazodone vs. vortioxetine) or novel agent Auvelity  - Consider augmentation with atypical antipsychotic (e.g., quetiapine or aripiprazole), though there are concerns given pre-diabetes      Toan Cotter MD  Dept of Psychiatry

## 2024-06-14 NOTE — PROGRESS NOTES
Patients VSS, A/O, IV removed, meets phase 2 criteria and is able to move to [facility] at this time. Report given to CHRISTINA Shay. Pt transported by staff.

## 2024-06-14 NOTE — ANESTHESIA PREPROCEDURE EVALUATION
Anesthesia Pre-Procedure Evaluation    Patient: Randi Cleary   MRN: [id number] : 1955        Procedure : *ECT          Past Medical History:   Diagnosis Date    Bipolar 2 disorder (H)     Breast cancer (H)     lumpectomy, radiation, chemo    Chronic pain syndrome     COPD (chronic obstructive pulmonary disease) (H)     asthma    Cord compression (H) 2021    Dizzy     Drug tolerance     opioid    Esophageal reflux     Fatigue     Generalized anxiety disorder     Graves disease     Hemochromatosis 2018    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; H63D not detected    History of breast cancer 2020    Formatting of this note might be different from the original. Created by Conversion  Replacement Utility updated for latest IMO load Formatting of this note might be different from the original. Created by Conversion  Replacement Utility updated for latest IMO load    History of corticosteroid therapy 2019    History of partial adrenalectomy (H24) 2019    History of pheochromocytoma 2019    Hx antineoplastic chemotherapy     Hx of radiation therapy     Hyperlipidaemia     Hypertension     Impaired fasting glucose     Injury of neck, whiplash 07/15/2021    Joint pain     KYAW (obstructive sleep apnea) 2016    Osteopenia     Pheochromocytoma, left 2017    laparoscopically removed    Postablative hypothyroidism 1995    Prediabetes 10/03/2019    by A1c    Psoriasis     Psoriatic arthropathy (H)     Right rotator cuff tear     RLS (restless legs syndrome)     on ropinorole    Sacroiliitis (H24)     Serotonin syndrome 2020    [facility] - While on desvenlafaxine 100mg    Snoring     Spinal stenosis     Status post coronary angiogram 10/03/2019    Urinary incontinence     Vitamin B 12 deficiency 2009    Vitamin D deficiency 2010      Past Surgical History:   Procedure Laterality Date    ARTHRODESIS ANKLE      ARTHROPLASTY ANKLE Right 2015    Procedure: ARTHROPLASTY  ANKLE;  Surgeon: Jason Coughlin MD;  Location: [facility]    ARTHROPLASTY REVISION ANKLE Right 6/29/2015    Procedure: ARTHROPLASTY REVISION ANKLE;  Surgeon: Jason Coughlin MD;  Location: [facility]    BIOPSY BREAST      BREAST BIOPSY, CORE RT/LT      COLONOSCOPY      COLONOSCOPY N/A 2/25/2021    Procedure: COLONOSCOPY;  Surgeon: Guru Elke Tolbert MD;  Location:  GI    CV CORONARY ANGIOGRAM N/A 10/3/2019    Procedure: CV CORONARY ANGIOGRAM;  Surgeon: Bryce Pierre MD;  Location:  HEART CARDIAC CATH LAB    CV RIGHT HEART CATH MEASUREMENTS RECORDED N/A 10/3/2019    Procedure: CV RIGHT HEART CATH;  Surgeon: Bryce Pierre MD;  Location:  HEART CARDIAC CATH LAB    ESOPHAGOSCOPY, GASTROSCOPY, DUODENOSCOPY (EGD), COMBINED N/A 2/25/2021    Procedure: ESOPHAGOGASTRODUODENOSCOPY, WITH BIOPSY;  Surgeon: Guru Elke Tolbert MD;  Location:  GI    EYE SURGERY  2021    HC REMOVE TONSILS/ADENOIDS,<11 Y/O      Description: Tonsillectomy With Adenoidectomy;  Recorded: 04/07/2010;    IR LUMBAR EPIDURAL STEROID INJECTION  10/26/2004    IR LUMBAR EPIDURAL STEROID INJECTION  11/16/2004    IR LUMBAR EPIDURAL STEROID INJECTION  12/21/2004    IR LUMBAR EPIDURAL STEROID INJECTION  6/8/2006    JOINT REPLACEMENT      LAMINOPLASTY CERVICAL POSTERIOR THREE+ LEVELS Left 12/21/2021    Procedure: CERVICAL 3-CERVICAL 6 LEFT OPEN DOOR LAMINOPLASTY AND LEFT CERVICAL 4-5 AND CERVICAL 6-7 POSTERIOR FORAMINOTOMY;  Surgeon: Angela Gregory MD;  Location: [facility]    LAPAROSCOPIC ADRENALECTOMY Left 08/02/2017    pheochromocytoma    LAPAROSCOPIC ADRENALECTOMY Left 8/2/2017    Procedure: LAPAROSCOPIC LEFT ADRENALECTOMY, ;  Surgeon: Gab Linares MD;  Location: [facility] [place];  Service:     LENGTHEN TENDON ACHILLES Right 6/29/2015    Procedure: LENGTHEN TENDON ACHILLES;  Surgeon: Jason Coughlin MD;  Location: [facility]    LUMPECTOMY BREAST      LUMPECTOMY BREAST Left 1994  "   MAMMOPLASTY REDUCTION Right 2015    [place]    MAMMOPLASTY REDUCTION Right     approx late /    MASTECTOMY      left lumpectomy with axillary node dissection    MASTECTOMY MODIFIED RADICAL      OTHER SURGICAL HISTORY Right     reconstructive breast surgery    OTHER SURGICAL HISTORY      Adrenalectomy for pheochromocytoma    UT MASTECTOMY, MODIFIED RADICAL      Description: Modified Radical Mastectomy Left Breast;  Recorded: 2010;    REPAIR HAMMER TOE Right 2015    Procedure: REPAIR HAMMER TOE;  Surgeon: Jason Coughlin MD;  Location: [facility]    TONSILLECTOMY      TONSILLECTOMY & ADENOIDECTOMY      ZC ARTHRODESIS,ANKLE,OPEN Right     Description: Ankle Arthrodesis;  Recorded: 2010;      Allergies   Allergen Reactions    Serotonin Reuptake Inhibitors (Ssris) Anxiety, Difficulty breathing, Headache, Palpitations and Shortness Of Breath    Buspirone      The patient states she had serotonin syndrome    Cephalexin      Other reaction(s): unknown rxn.    Desvenlafaxine      Serotonin syndrome    Diclofenac Sodium [Diclofenac]      Serotonin syndrome and restless legs syndrome    Gabapentin      Drove on the wrong side of the highway    Levofloxacin      \"CAN'T REMEMBER\"    Penicillins      \"SORES IN MOUTH\"    Riluzole Difficulty breathing and Swelling    Sulfa Antibiotics      \"PT DOES NOT KNOW WHAT THE REACTION WAS\"    Topiramate Other (See Comments)     Frequent urination      Social History     Tobacco Use    Smoking status: Former     Current packs/day: 0.00     Average packs/day: 2.5 packs/day for 29.2 years (72.9 ttl pk-yrs)     Types: Cigarettes     Start date: 1971     Quit date: 2000     Years since quittin.9     Passive exposure: Past    Smokeless tobacco: Never   Substance Use Topics    Alcohol use: Not Currently     Comment: relapse 2021 sober       Wt Readings from Last 1 Encounters:   24 87.5 kg (193 lb)        Anesthesia Evaluation   Pt has had " prior anesthetic.     No history of anesthetic complications       ROS/MED HX  ENT/Pulmonary:     (+) sleep apnea, uses CPAP,                        COPD,              Neurologic:       Cardiovascular: Comment: Pulmonary HTN.  Followed by Cardiology.  Next right heart cath scheduled for 6/19/2024.    (+)  hypertension- -   -  - -                                 Previous cardiac testing   Echo: Date: Results:    Stress Test:  Date: Results:    ECG Reviewed:  Date: 5/21/2024 Results:  RBBB, sinus rhythm  Cath:  Date: Results:      METS/Exercise Tolerance:     Hematologic:       Musculoskeletal: Comment: S/P Cervical Surgery - laminectomy   SIJ pain & joint pain  Osteopenia      GI/Hepatic: Comment: Hepatic steatosis    (+) GERD,                   Renal/Genitourinary:       Endo: Comment: Graves Disease. S/P Radioiodine Tx.    Hx of pheochromocytoma, S/P Left Adrenalectomy 8/2017    (+)          thyroid problem,     Obesity,       Psychiatric/Substance Use: Comment: MDD, recurrent, severe, with anxious distress      Infectious Disease:       Malignancy:   (+) Malignancy, History of Breast.Breast CA Remission status post Surgery and Chemo.      Other:            Physical Exam    Airway  airway exam normal      Mallampati: II   TM distance: > 3 FB   Neck ROM: full   Mouth opening: > 3 cm    Respiratory Devices and Support         Dental       (+) Minor Abnormalities - some fillings, tiny chips      Cardiovascular   cardiovascular exam normal          Pulmonary   pulmonary exam normal            Other findings: S/P Cervical Surgery, good ROM  OUTSIDE LABS:  CBC:   Lab Results   Component Value Date    WBC 7.9 06/04/2024    WBC 7.1 05/22/2024    HGB 17.0 (H) 06/04/2024    HGB 17.7 (H) 05/22/2024    HCT 48.1 (H) 06/04/2024    HCT 49.9 (H) 05/22/2024     06/04/2024     05/22/2024     BMP:   Lab Results   Component Value Date     06/04/2024     05/22/2024    POTASSIUM 4.3 06/04/2024    POTASSIUM 5.2  06/04/2024    CHLORIDE 103 06/04/2024    CHLORIDE 104 05/22/2024    CO2 21 (L) 06/04/2024    CO2 24 05/22/2024    BUN 17.3 06/04/2024    BUN 9.3 05/22/2024    CR 0.53 06/04/2024    CR 0.57 05/22/2024    GLC 96 06/04/2024     (H) 05/22/2024     COAGS:   Lab Results   Component Value Date    PTT 34 12/13/2021    INR 0.94 12/13/2021     POC:   Lab Results   Component Value Date     (H) 02/25/2021     HEPATIC:   Lab Results   Component Value Date    ALBUMIN 3.7 06/04/2024    PROTTOTAL 7.2 06/04/2024    ALT 33 06/04/2024    AST 35 06/04/2024    ALKPHOS 80 06/04/2024    BILITOTAL 0.4 06/04/2024     OTHER:   Lab Results   Component Value Date    A1C 5.6 05/22/2024    LINDA 9.5 06/04/2024    MAG 1.9 05/22/2024    TSH 1.69 06/04/2024    T4 1.49 03/29/2024    T3 114 01/18/2023    CRP <2.9 11/16/2021    SED 8 10/17/2023       Anesthesia Plan    ASA Status:  3    NPO Status:  NPO Appropriate    Anesthesia Type: General.     - Airway: Mask Only   Induction: Intravenous.   Maintenance: N/A.        Consents    Anesthesia Plan(s) and associated risks, benefits, and realistic alternatives discussed. Questions answered and patient/representative(s) expressed understanding.     - Discussed: Risks, Benefits and Alternatives for BOTH SEDATION and the PROCEDURE were discussed     - Discussed with:  Patient      - Extended Intubation/Ventilatory Support Discussed: No.      - Patient is DNR/DNI Status: No     Use of blood products discussed: No .     Postoperative Care    Pain management: Oral pain medications.   PONV prophylaxis: Ondansetron (or other 5HT-3)     Comments:    Other Comments: ECT           Huyen Ledesma MD    I have reviewed the pertinent notes and labs in the chart from the past 30 days and (re)examined the patient.  Any updates or changes from those notes are reflected in this note.

## 2024-06-14 NOTE — PLAN OF CARE
Pt has been intermittently visible in the milieu, social with peers. She endorses anxiety rated 7/10, denies other mental health symptoms. Pt had ECT today, stated that she felt as though it went well. She reported feeling tearful following ECT. She is med compliant without issue, received PRN tylenol for c/o head/neck pain. Intake and hygiene are adequate, no other concerns.     Problem: Suicide Risk  Goal: Absence of Self-Harm  Outcome: Progressing   Goal Outcome Evaluation:    Plan of Care Reviewed With: patient

## 2024-06-14 NOTE — PLAN OF CARE
BEH IP Unit Acuity Rating Score (UARS)  Patient is given one point for every criteria they meet.    CRITERIA SCORING   On a 72 hour hold, court hold, committed, stay of commitment, or revocation. 0    Patient LOS on BEH unit exceeds 20 days. 1  LOS: 24   Patient under guardianship, 55+, otherwise medically complex, or under age 11. 1   Suicide ideation without relief of precipitating factors. 0   Current plan for suicide. 0   Current plan for homicide. 0   Imminent risk or actual attempt to seriously harm another without relief of factors precipitating the attempt. 0   Severe dysfunction in daily living (ex: complete neglect for self care, extreme disruption in vegetative function, extreme deterioration in social interactions). 1   Recent (last 7 days) or current physical aggression in the ED or on unit. 0   Restraints or seclusion episode in past 72 hours. 0   Recent (last 7 days) or current verbal aggression, agitation, yelling, etc., while in the ED or unit. 0   Active psychosis. 0   Need for constant or near constant redirection (from leaving, from others, etc).  0   Intrusive or disruptive behaviors. 0   Patient requires 3 or more hours of individualized nursing care per 8-hour shift (i.e. for ADLs, meds, therapeutic interventions). 0   TOTAL 3

## 2024-06-14 NOTE — PROGRESS NOTES
"  ----------------------------------------------------------------------------------------------------------  [facility]  Psychiatry Progress Note  Hospital Day #24     Subjective:     Patient Interview: Winnie was interviewed in the lounge. She says she is doing better than last night. She had a really bad headache last night that is mostly gone. At group last night another patient made some comments about the way she walks and she reacted by calling him a name. The event was upsetting and she fell into her teary and anxious state she said. She informed staff she wants him kept away from her. Says she is feeling okay today. Has ECT #9 today. Got a call last night from the 55 plus group telling her they have an 8 week wait.     Objective:     Vitals:  /66 (BP Location: Right arm)   Pulse 80   Temp 98.5  F (36.9  C) (Oral)   Resp 16   Ht 1.676 m (5' 6\")   Wt 87.5 kg (193 lb)   LMP  (LMP Unknown)   SpO2 93%   BMI 31.15 kg/m      Mental Status Exam:  Oriented to:  Grossly Oriented  General:  Awake and Alert  Appearance:  appears stated age and Grooming is adequate  Behavior/Attitude:  cooperative and open conversational  Eye Contact:  appropriate  Psychomotor: normal and no evidence of tics, dystonia, or tardive dyskinesia no catatonia present  Speech:  appropriate volume/tone and talkative  Language: Fluent in English with appropriate syntax and vocabulary.  Mood:  \"better than last night\"  Affect: congruent with mood, full range  Thought Process:  coherent and goal directed  Thought Content:  No HI, No VH, and No AH, no SI; No apparent delusions  Associations:  intact  Insight:  fair due to recognizing the circumstances of her mental health  Judgment:  fair due to willingness to engage in ECT  Impulse control: good  Attention Span:  grossly intact  Concentration:  grossly intact  Recent and Remote Memory:  not formally assessed  Fund of Knowledge:  " average  Muscle Strength and Tone: normal  Gait and Station: Normal     Psychiatric Assessment     Randi Cleary is a 68 year old female previously diagnosed with MDD, recurrent severe, substance induced mood disorder, Unspecified Trauma, CHAVO, borderline personality disorder and alcohol use disorder who presented voluntarily with SI in the context of treatment resistant MDD. On admission she presented with significant symptoms of depression incuding emotional lability, low mood, hopelessness, amotivation, anxiety, anhedonia, low energy, insomnia low appetite, excess guilt and poor concentration. Her affect was dysphroic and anxious at times with heavy perseveration on medico-surgical dignosises and passive SI causing incresed anxiety. She notes she has had a plan but no intent. Her history of extensive substance use, medical history and chronic pain contribute biologically. Her presentation is further complicated by borderline personality disorder and history of trauma. Additionally, she has a very limited support system. However her last hospitalization was in the 1980's for a suicide attempt via Prozac overdose and has been engaged in treatment and present voluntarily. Her presentation is consistent with decompensated Major Depressive Disorder, recurrent severe. Her disorder has been treatment resistant, including to TMS, and at this time, ECT is be the best course of action to address her symptom severity. Patient warrants inpatient psychiatric hospitalization to maintain her safety. Given her improvement with each ECT treatment thus far and her concerns about the stresses of her home life, it is reasonable to keep her inpatient until she completes the full course of ECT.     Psychiatric Plan by Diagnosis      Today's changes:  - ECT #9 performed today      # MDD, recurrent, severe, with anxious distress  - Patient suffers from treatment resistant depression and has trialed many medications. At this point  treatment team will need to do a chart review to review medication trials and determine the best medication to address depressive symptoms long term  - Pt started ECT 5/24 : Hold high-dose gabapentin/pregabalin after 6pm on the day before ECT (to permit adequate seizure)   - Continue ECT MWF     #Insomnia  - Zolpidem 5mg qpm prn    Pertinent Labs/Monitoring:   - EKG 5/22 - Qtc 458 NSR, RBBB     Additional Plans:  - Patient will be treated in therapeutic milieu with appropriate individual and group therapies as described     Psychiatric Hospital Course:      Randi Cleary was admitted to [facility] as a voluntary patient for electroconvulsive therapy for lifelong history of depression. Multiple medication trials were not effective. Symptoms notable for low mood, anhedonia, sleep difficulties, guilt and worthlessness, suicidal ideation with plan without intent. Continued on home meds with ECT while inpt. Did have an episode early on in ECT series while here where muscle paralytic was given before sedation. Pt was understandably agitated from this but was able to pursue further ECT as she found it helpful overall in due course.   Today is her 9th ECT session. This treatment has been effective in managing her symptoms this admission.     The risks, benefits, alternatives, and side effects were discussed and understood by the patient.     Medical Assessment and Plan     Medical diagnoses to be addressed this admission:    # Hypothyroidism  - PTA 150mcg M-Sa, 75mcg on Perez     # KYAW   - Doesn't use CPAP at home, sleep study on 06/2024      # RLS  - Continue PTA Ropinirole 2 mg PO TID  - Gabapentin 400mg qpm  - Magnesium Citrated changed to Magnesium Oxide 400mg BID (per patient and medicine)    #RBBB:  Previously on other EKGs  CTM    #Cervical radiculopathy and myelopathy s/p cervical laminoplasty 12/2021    # Chronic Pain  - Continue PTA Roxicodone 5 mg q6h + 5 mg PRN (for max daily dose of 25 mg)  - Menthol 2.5%  topical gel q6hrs prn  - TENS Unit     #Dizziness  #Headache  Reports hx of migraines, no vision changes or hearing changes. notes this has been ongoing since she had her spinal surgery.   - CT head 5/22 unremarkable    #Gout  - Nutrition consult  - Allopurinol 300mg qpm  - Flares in left podagra but none currently    #Vitamin B12 Deficiency  - Vit B12 500mcg qday    #Vitamin C Deficiency  - Ascorbic Acid 1000mg    #Vitamin D Deficiency  - 250mcg weekly    #COPD  - Breo Ellipta 1 puff daily   - Incruse Ellipta 1 puff daily  - Tessalon cap 200mg TID prn  - Albuterol 2 puffs q6hr prn  - Mucinex 1200mg BID prn     #GERD  #Hiatal Hernia  - Protonix 40mg daily    #Possible pulmonary HTN  #HTN  Per Medicine note, patient follows with cardiology here. She had a right heart catheterization scheduled for 06/12/24, but Winnie said she would call to cancel it on 06/11/24 as she was still inpatient on the unit and had ECT scheduled.  PTA ethacrynic acid 12.5mg daily and potassium supplement. Electrolytes and renal function stable. Recently lost 50lbs and BP has improved.   - Ethacrynic Acid 12.5mg qday   - Edecrin 12.5mg daily   - Continue potassium supplement   - Klorcon 20meq daily     #Hemochromatosis, hereditary  - Hgb Stable at ~17    #Hepatic Steatosis  - Stable  - LFTs wnl, no abdominal pain, distention, N/V    #History of breast cancer s/p mastectomy, chemo and XRT:   - currently in remission      #Alcohol use disorder, in remission:   - No current use. Two years sober     #Hx of pheochromocytoma s/p left adrenalectomy 08/2017:   - Stable  - Follows Endocrinology for this.     #Psoriasis:  - Noted on R hang  - Uses scalp brush    Medical course: Patient was physically examined by the ED prior to being transferred to the unit and was found to be medically stable and appropriate for admission. Medicine consult was done to provide clearance for ECT. Due to patient's persistent neck pain after surgery in 2021, CT Head was  done which was negative. Oxycodone changed from q6hr prn to scheduled with an extra 5mg in the day as needed per PTA regimen. Patient continued to express some nerve pain so gabapentin was increased to 400mg.     Medical course: Patient was physically examined by the ED prior to being transferred to the unit and was found to be medically stable and appropriate for admission.     Consults: Dietician, Medicine for ECT Clearance, ECT Consult, Nutrition     Checklist     Legal Status: Voluntary     Safety Assessment:   Behavioral Orders   Procedures    Code 1 - Restrict to Unit    Code 2 - 1:1 Staff Supervision     For coming down to ECT only    Discontinue 1:1 attendant for suicide risk     Order Specific Question:   I have performed an in person assessment of the patient     Answer:   Based on this assessment the patient no longer requires a one on one attendant at this point in time.     Order Specific Question:   Rationale     Answer:   Patient States able to remain safe in hospital     Order Specific Question:   Rationale     Answer:   Modifications to care environment made to mitigate safety risk     Order Specific Question:   Rationale     Answer:   Routine observations are sufficient to monitor safety.    Electroconvulsive therapy     Series of up to 12 treatments. Begin Date: 5/24/24     Treating Psychiatrist providing ECT:  Ruthann     Notified on:  5/21/24    Electroconvulsive therapy     Series of up to 12 treatments. Begin Date: 5/24/24     Treating Psychiatrist providing ECT:  Ruthann     Notified on:  5/21/24    Fall precautions    Routine Programming     As clinically indicated    Status 15     Every 15 minutes.    Suicide precautions: Suicide Risk: MODERATE; Clinical rationale to override score: lack of access to a plan for self-harm     Order Specific Question:   Suicide Risk     Answer:   MODERATE     Order Specific Question:   Clinical rationale to override score:     Answer:   lack of access to a  plan for self-harm       Risk Assessment:  Risk for harm is moderate.  Risk factors: SI, maladaptive coping, trauma, and past behaviors  Protective factors: engaged in treatment     SIO: Discontinued    Disposition: Pending stabilization, medication optimization, & development of a safe discharge plan. Pt has been accepted to  plus program once stable. Likely discharge is at end of ECT course, which is scheduled to be Friday, June 21.      Attestations      Sung Myers MS3     Resident Attestation:   I was present with the medical student who participated in the service and in the documentation of the note.  I have verified the history and personally performed the physical exam, mental status exam, and medical decision making. I agree with the assessment and plan of care as documented in the note.     Yennifer Martinez MD  Psychiatry Resident Physician'

## 2024-06-14 NOTE — ANESTHESIA CARE TRANSFER NOTE
Patient: Randi Cleary    Procedure: * No procedures listed *       Diagnosis: * No pre-op diagnosis entered *  Diagnosis Additional Information: No value filed.    Anesthesia Type:   General     Note:    Oropharynx: oropharynx clear of all foreign objects and spontaneously breathing  Level of Consciousness: awake and drowsy  Oxygen Supplementation: room air    Independent Airway: airway patency satisfactory and stable  Dentition: dentition unchanged  Vital Signs Stable: post-procedure vital signs reviewed and stable  Report to RN Given: handoff report given  Patient transferred to: PACU    Handoff Report: Identifed the Patient, Identified the Reponsible Provider, Reviewed the pertinent medical history, Discussed the surgical course, Reviewed Intra-OP anesthesia mangement and issues during anesthesia, Set expectations for post-procedure period and Allowed opportunity for questions and acknowledgement of understanding      Vitals:  Vitals Value Taken Time   BP     Temp     Pulse     Resp     SpO2         Electronically Signed By: Shen Gunter MD  June 14, 2024  12:34 PM

## 2024-06-14 NOTE — PLAN OF CARE
Pt will be having ECT this morning; NPO as at mid night.  No PRNs given or requested this shift; pt remained in her room the entire shift. Pain controlled with the use of scheduled Oxycodone. Safety checks completed every 15 minutes;  No concerns noted/reported. Pt appears to have slept for  6.75 hours; will continue to monitor and offer support.      Problem: Sleep Disturbance  Goal: Adequate Sleep/Rest  Outcome: Progressing   Goal Outcome Evaluation:

## 2024-06-15 PROCEDURE — 250N000013 HC RX MED GY IP 250 OP 250 PS 637

## 2024-06-15 PROCEDURE — 124N000002 HC R&B MH UMMC

## 2024-06-15 PROCEDURE — 250N000013 HC RX MED GY IP 250 OP 250 PS 637: Performed by: PHYSICIAN ASSISTANT

## 2024-06-15 PROCEDURE — 250N000013 HC RX MED GY IP 250 OP 250 PS 637: Performed by: PSYCHIATRY & NEUROLOGY

## 2024-06-15 RX ADMIN — Medication 12.5 MG: at 09:32

## 2024-06-15 RX ADMIN — LEVOTHYROXINE SODIUM 150 MCG: 75 TABLET ORAL at 07:08

## 2024-06-15 RX ADMIN — MAGNESIUM OXIDE TAB 400 MG (241.3 MG ELEMENTAL MG) 400 MG: 400 (241.3 MG) TAB at 09:33

## 2024-06-15 RX ADMIN — GUAIFENESIN 600 MG: 600 TABLET ORAL at 20:51

## 2024-06-15 RX ADMIN — ROPINIROLE HYDROCHLORIDE 2 MG: 2 TABLET, FILM COATED ORAL at 23:02

## 2024-06-15 RX ADMIN — OXYCODONE HYDROCHLORIDE 5 MG: 5 TABLET ORAL at 07:05

## 2024-06-15 RX ADMIN — MAGNESIUM OXIDE TAB 400 MG (241.3 MG ELEMENTAL MG) 400 MG: 400 (241.3 MG) TAB at 20:52

## 2024-06-15 RX ADMIN — ROPINIROLE HYDROCHLORIDE 2 MG: 2 TABLET, FILM COATED ORAL at 20:51

## 2024-06-15 RX ADMIN — ROPINIROLE HYDROCHLORIDE 2 MG: 2 TABLET, FILM COATED ORAL at 14:57

## 2024-06-15 RX ADMIN — ACETAMINOPHEN 650 MG: 325 TABLET, FILM COATED ORAL at 09:37

## 2024-06-15 RX ADMIN — GABAPENTIN 400 MG: 400 CAPSULE ORAL at 23:02

## 2024-06-15 RX ADMIN — Medication 500 MCG: at 09:33

## 2024-06-15 RX ADMIN — POTASSIUM CHLORIDE 20 MEQ: 1500 TABLET, EXTENDED RELEASE ORAL at 09:33

## 2024-06-15 RX ADMIN — OXYCODONE HYDROCHLORIDE 5 MG: 5 TABLET ORAL at 12:28

## 2024-06-15 RX ADMIN — OXYCODONE HYDROCHLORIDE 5 MG: 5 TABLET ORAL at 15:04

## 2024-06-15 RX ADMIN — ALLOPURINOL 300 MG: 300 TABLET ORAL at 20:51

## 2024-06-15 RX ADMIN — UMECLIDINIUM 1 PUFF: 62.5 AEROSOL, POWDER ORAL at 09:34

## 2024-06-15 RX ADMIN — Medication 3 MG: at 23:06

## 2024-06-15 RX ADMIN — Medication 1000 MG: at 09:33

## 2024-06-15 RX ADMIN — OXYCODONE HYDROCHLORIDE 5 MG: 5 TABLET ORAL at 18:09

## 2024-06-15 RX ADMIN — GUAIFENESIN 600 MG: 600 TABLET ORAL at 09:33

## 2024-06-15 RX ADMIN — OXYCODONE HYDROCHLORIDE 5 MG: 5 TABLET ORAL at 23:02

## 2024-06-15 RX ADMIN — FLUTICASONE FUROATE AND VILANTEROL TRIFENATATE 1 PUFF: 100; 25 POWDER RESPIRATORY (INHALATION) at 09:35

## 2024-06-15 ASSESSMENT — ACTIVITIES OF DAILY LIVING (ADL)
ORAL_HYGIENE: INDEPENDENT
ADLS_ACUITY_SCORE: 41
HYGIENE/GROOMING: INDEPENDENT
ADLS_ACUITY_SCORE: 41
ADLS_ACUITY_SCORE: 41
DRESS: INDEPENDENT;STREET CLOTHES
ADLS_ACUITY_SCORE: 41
ORAL_HYGIENE: INDEPENDENT
ADLS_ACUITY_SCORE: 41
ADLS_ACUITY_SCORE: 41
HYGIENE/GROOMING: INDEPENDENT
ADLS_ACUITY_SCORE: 41
DRESS: INDEPENDENT;STREET CLOTHES

## 2024-06-15 NOTE — PLAN OF CARE
No PRNs given or requested this shift.  Safety checks completed every 15 minutes;  no concerns noted.   Pain controlled with use of scheduled Oxycodone.  Pt appears to have slept for  6.75 hours; will continue to monitor and offer support.     Problem: Sleep Disturbance  Goal: Adequate Sleep/Rest  Outcome: Progressing   Goal Outcome Evaluation:

## 2024-06-15 NOTE — PLAN OF CARE
Problem: Suicide Risk  Goal: Absence of Self-Harm  Outcome: Progressing     Problem: Suicidal Behavior  Goal: Suicidal Behavior is Absent or Managed  Outcome: Progressing   Goal Outcome Evaluation:    Plan of Care Reviewed With: patient        Alert and oriented. Patient was pleasant, cooperative and medication compliant.   She was visible in milieu, social and interactive with peers and staff members. Endorsed anxiety and depression, denied suicidal thought.  visited, visit went well. ADLs WNL, 7/10 back, bilateral shoulders and neck pain, pain managed with scheduled pain medications. Food and fluid intake WNL. No group attendance.

## 2024-06-15 NOTE — PLAN OF CARE
"Pt denies SI.  Asked pt if she felt depressed she responded she did not to go down that rabbit hole.  Pt very social with select peers.  Pt affect bright at times.  Pt's  came to visit.  Pt  ate meals in dining area and talked with her peers. Pt filled out menus for meals. Pt has been out of her room for most of the shift.    Problem: Adult Inpatient Plan of Care  Goal: Plan of Care Review  Description: The Plan of Care Review/Shift note should be completed every shift.  The Outcome Evaluation is a brief statement about your assessment that the patient is improving, declining, or no change.  This information will be displayed automatically on your shift  note.  Outcome: Not Progressing  Goal: Patient-Specific Goal (Individualized)  Description: You can add care plan individualizations to a care plan. Examples of Individualization might be:  \"Parent requests to be called daily at 9am for status\", \"I have a hard time hearing out of my right ear\", or \"Do not touch me to wake me up as it startles  me\".  Outcome: Not Progressing  Goal: Absence of Hospital-Acquired Illness or Injury  Outcome: Not Progressing  Goal: Optimal Comfort and Wellbeing  Outcome: Not Progressing  Intervention: Monitor Pain and Promote Comfort  Recent Flowsheet Documentation  Taken 6/15/2024 1504 by Winsome Burns, RN  Pain Management Interventions: medication (see MAR)  Taken 6/15/2024 0937 by Winsome Burns, RN  Pain Management Interventions: medication (see MAR)  Goal: Readiness for Transition of Care  Outcome: Not Progressing   Goal Outcome Evaluation:                        "

## 2024-06-16 PROCEDURE — 250N000013 HC RX MED GY IP 250 OP 250 PS 637

## 2024-06-16 PROCEDURE — 124N000002 HC R&B MH UMMC

## 2024-06-16 PROCEDURE — 250N000013 HC RX MED GY IP 250 OP 250 PS 637: Performed by: PHYSICIAN ASSISTANT

## 2024-06-16 PROCEDURE — 250N000013 HC RX MED GY IP 250 OP 250 PS 637: Performed by: PSYCHIATRY & NEUROLOGY

## 2024-06-16 RX ADMIN — LEVOTHYROXINE SODIUM 75 MCG: 75 TABLET ORAL at 06:18

## 2024-06-16 RX ADMIN — ALLOPURINOL 300 MG: 300 TABLET ORAL at 20:08

## 2024-06-16 RX ADMIN — PANTOPRAZOLE SODIUM 40 MG: 40 TABLET, DELAYED RELEASE ORAL at 08:45

## 2024-06-16 RX ADMIN — ROPINIROLE HYDROCHLORIDE 2 MG: 2 TABLET, FILM COATED ORAL at 22:58

## 2024-06-16 RX ADMIN — POLYETHYLENE GLYCOL 3350 17 G: 17 POWDER, FOR SOLUTION ORAL at 20:41

## 2024-06-16 RX ADMIN — GABAPENTIN 100 MG: 100 CAPSULE ORAL at 17:35

## 2024-06-16 RX ADMIN — GUAIFENESIN 600 MG: 600 TABLET ORAL at 08:45

## 2024-06-16 RX ADMIN — UMECLIDINIUM 1 PUFF: 62.5 AEROSOL, POWDER ORAL at 08:45

## 2024-06-16 RX ADMIN — MAGNESIUM OXIDE TAB 400 MG (241.3 MG ELEMENTAL MG) 400 MG: 400 (241.3 MG) TAB at 08:45

## 2024-06-16 RX ADMIN — OXYCODONE HYDROCHLORIDE 5 MG: 5 TABLET ORAL at 12:16

## 2024-06-16 RX ADMIN — Medication 3 MG: at 22:58

## 2024-06-16 RX ADMIN — OXYCODONE HYDROCHLORIDE 5 MG: 5 TABLET ORAL at 17:35

## 2024-06-16 RX ADMIN — ROPINIROLE HYDROCHLORIDE 2 MG: 2 TABLET, FILM COATED ORAL at 20:08

## 2024-06-16 RX ADMIN — FLUTICASONE FUROATE AND VILANTEROL TRIFENATATE 1 PUFF: 100; 25 POWDER RESPIRATORY (INHALATION) at 08:45

## 2024-06-16 RX ADMIN — ROPINIROLE HYDROCHLORIDE 2 MG: 2 TABLET, FILM COATED ORAL at 14:43

## 2024-06-16 RX ADMIN — Medication 1000 MG: at 08:48

## 2024-06-16 RX ADMIN — OXYCODONE HYDROCHLORIDE 5 MG: 5 TABLET ORAL at 06:18

## 2024-06-16 RX ADMIN — MAGNESIUM OXIDE TAB 400 MG (241.3 MG ELEMENTAL MG) 400 MG: 400 (241.3 MG) TAB at 20:08

## 2024-06-16 RX ADMIN — OXYCODONE HYDROCHLORIDE 5 MG: 5 TABLET ORAL at 22:59

## 2024-06-16 RX ADMIN — Medication 12.5 MG: at 08:45

## 2024-06-16 RX ADMIN — GUAIFENESIN 600 MG: 600 TABLET ORAL at 20:08

## 2024-06-16 RX ADMIN — Medication 500 MCG: at 08:45

## 2024-06-16 RX ADMIN — POTASSIUM CHLORIDE 20 MEQ: 1500 TABLET, EXTENDED RELEASE ORAL at 08:45

## 2024-06-16 ASSESSMENT — ACTIVITIES OF DAILY LIVING (ADL)
ADLS_ACUITY_SCORE: 41
ADLS_ACUITY_SCORE: 41
ORAL_HYGIENE: INDEPENDENT
ADLS_ACUITY_SCORE: 41
ADLS_ACUITY_SCORE: 41
HYGIENE/GROOMING: INDEPENDENT
ADLS_ACUITY_SCORE: 41
DRESS: INDEPENDENT;STREET CLOTHES
ADLS_ACUITY_SCORE: 41
HYGIENE/GROOMING: INDEPENDENT
ADLS_ACUITY_SCORE: 41
ORAL_HYGIENE: INDEPENDENT
ADLS_ACUITY_SCORE: 41
DRESS: INDEPENDENT;STREET CLOTHES
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
## 2024-06-16 NOTE — PLAN OF CARE
No PRNs given or requested this shift. Safety checks completed every 15 minutes;  No concerns noted.  Pt appears to have slept for  6.75 hours; will continue to monitor and offer support.    Problem: Sleep Disturbance  Goal: Adequate Sleep/Rest  Outcome: Progressing   Goal Outcome Evaluation:

## 2024-06-16 NOTE — PLAN OF CARE
"Pt denied SI.  Pt continues to be social with select peers.  Pt ate supper in dinning and was observed talking with her peers. Pt said she is supposed to have a sleep study tomorrow night. Pt stated she called after hours number to call to cancel the sleep study.  Pt asked staff several times if they had called. Pt stated she left a message.  Pt affect bright at times.  Pt medication compliant.    Problem: Adult Inpatient Plan of Care  Goal: Plan of Care Review  Description: The Plan of Care Review/Shift note should be completed every shift.  The Outcome Evaluation is a brief statement about your assessment that the patient is improving, declining, or no change.  This information will be displayed automatically on your shift  note.  Outcome: Not Progressing  Goal: Patient-Specific Goal (Individualized)  Description: You can add care plan individualizations to a care plan. Examples of Individualization might be:  \"Parent requests to be called daily at 9am for status\", \"I have a hard time hearing out of my right ear\", or \"Do not touch me to wake me up as it startles  me\".  Outcome: Not Progressing  Goal: Absence of Hospital-Acquired Illness or Injury  Outcome: Not Progressing  Goal: Optimal Comfort and Wellbeing  Outcome: Not Progressing  Intervention: Monitor Pain and Promote Comfort  Recent Flowsheet Documentation  Taken 6/15/2024 1504 by Winsome Burns RN  Pain Management Interventions: medication (see MAR)  Taken 6/15/2024 0937 by Winsome Burns, RN  Pain Management Interventions: medication (see MAR)  Goal: Readiness for Transition of Care  Outcome: Not Progressing   Goal Outcome Evaluation:                        "

## 2024-06-17 ENCOUNTER — APPOINTMENT (OUTPATIENT)
Dept: BEHAVIORAL HEALTH | Facility: CLINIC | Age: 69
DRG: 881 | End: 2024-06-17
Payer: MEDICARE

## 2024-06-17 ENCOUNTER — ANESTHESIA (OUTPATIENT)
Dept: BEHAVIORAL HEALTH | Facility: CLINIC | Age: 69
DRG: 881 | End: 2024-06-17
Payer: MEDICARE

## 2024-06-17 ENCOUNTER — ANESTHESIA EVENT (OUTPATIENT)
Dept: BEHAVIORAL HEALTH | Facility: CLINIC | Age: 69
DRG: 881 | End: 2024-06-17
Payer: MEDICARE

## 2024-06-17 PROCEDURE — 124N000002 HC R&B MH UMMC

## 2024-06-17 PROCEDURE — 90853 GROUP PSYCHOTHERAPY: CPT

## 2024-06-17 PROCEDURE — 90870 ELECTROCONVULSIVE THERAPY: CPT

## 2024-06-17 PROCEDURE — 250N000013 HC RX MED GY IP 250 OP 250 PS 637: Performed by: PHYSICIAN ASSISTANT

## 2024-06-17 PROCEDURE — 250N000013 HC RX MED GY IP 250 OP 250 PS 637

## 2024-06-17 PROCEDURE — 90870 ELECTROCONVULSIVE THERAPY: CPT | Performed by: PSYCHIATRY & NEUROLOGY

## 2024-06-17 PROCEDURE — 250N000011 HC RX IP 250 OP 636: Performed by: ANESTHESIOLOGY

## 2024-06-17 PROCEDURE — 250N000011 HC RX IP 250 OP 636: Mod: JZ | Performed by: PSYCHIATRY & NEUROLOGY

## 2024-06-17 PROCEDURE — 250N000009 HC RX 250: Performed by: ANESTHESIOLOGY

## 2024-06-17 PROCEDURE — 90870 ELECTROCONVULSIVE THERAPY: CPT | Performed by: ANESTHESIOLOGY

## 2024-06-17 PROCEDURE — 370N000017 HC ANESTHESIA TECHNICAL FEE, PER MIN: Performed by: ANESTHESIOLOGY

## 2024-06-17 PROCEDURE — 250N000013 HC RX MED GY IP 250 OP 250 PS 637: Performed by: PSYCHIATRY & NEUROLOGY

## 2024-06-17 PROCEDURE — 99232 SBSQ HOSP IP/OBS MODERATE 35: CPT | Mod: 25 | Performed by: PSYCHIATRY & NEUROLOGY

## 2024-06-17 RX ORDER — DEXAMETHASONE SODIUM PHOSPHATE 4 MG/ML
4 INJECTION, SOLUTION INTRA-ARTICULAR; INTRALESIONAL; INTRAMUSCULAR; INTRAVENOUS; SOFT TISSUE
Status: CANCELLED | OUTPATIENT
Start: 2024-06-17

## 2024-06-17 RX ORDER — FENTANYL CITRATE 50 UG/ML
25 INJECTION, SOLUTION INTRAMUSCULAR; INTRAVENOUS EVERY 5 MIN PRN
Status: CANCELLED | OUTPATIENT
Start: 2024-06-17

## 2024-06-17 RX ORDER — HYDROMORPHONE HCL IN WATER/PF 6 MG/30 ML
0.2 PATIENT CONTROLLED ANALGESIA SYRINGE INTRAVENOUS EVERY 5 MIN PRN
Status: CANCELLED | OUTPATIENT
Start: 2024-06-17

## 2024-06-17 RX ORDER — NALOXONE HYDROCHLORIDE 0.4 MG/ML
0.1 INJECTION, SOLUTION INTRAMUSCULAR; INTRAVENOUS; SUBCUTANEOUS
Status: CANCELLED | OUTPATIENT
Start: 2024-06-17

## 2024-06-17 RX ORDER — KETOROLAC TROMETHAMINE 30 MG/ML
30 INJECTION, SOLUTION INTRAMUSCULAR; INTRAVENOUS ONCE
Status: DISCONTINUED | OUTPATIENT
Start: 2024-06-17 | End: 2024-06-17

## 2024-06-17 RX ORDER — SODIUM CHLORIDE, SODIUM LACTATE, POTASSIUM CHLORIDE, CALCIUM CHLORIDE 600; 310; 30; 20 MG/100ML; MG/100ML; MG/100ML; MG/100ML
INJECTION, SOLUTION INTRAVENOUS CONTINUOUS
Status: CANCELLED | OUTPATIENT
Start: 2024-06-17

## 2024-06-17 RX ORDER — ONDANSETRON 4 MG/1
4 TABLET, ORALLY DISINTEGRATING ORAL EVERY 30 MIN PRN
Status: CANCELLED | OUTPATIENT
Start: 2024-06-17

## 2024-06-17 RX ORDER — HYDROMORPHONE HCL IN WATER/PF 6 MG/30 ML
0.4 PATIENT CONTROLLED ANALGESIA SYRINGE INTRAVENOUS EVERY 5 MIN PRN
Status: CANCELLED | OUTPATIENT
Start: 2024-06-17

## 2024-06-17 RX ORDER — ONDANSETRON 2 MG/ML
4 INJECTION INTRAMUSCULAR; INTRAVENOUS EVERY 30 MIN PRN
Status: CANCELLED | OUTPATIENT
Start: 2024-06-17

## 2024-06-17 RX ORDER — LABETALOL HYDROCHLORIDE 5 MG/ML
INJECTION, SOLUTION INTRAVENOUS PRN
Status: DISCONTINUED | OUTPATIENT
Start: 2024-06-17 | End: 2024-06-17

## 2024-06-17 RX ORDER — METHOHEXITAL IN WATER/PF 100MG/10ML
SYRINGE (ML) INTRAVENOUS PRN
Status: DISCONTINUED | OUTPATIENT
Start: 2024-06-17 | End: 2024-06-17

## 2024-06-17 RX ORDER — OXYCODONE HYDROCHLORIDE 5 MG/1
5 TABLET ORAL
Status: DISCONTINUED | OUTPATIENT
Start: 2024-06-17 | End: 2024-06-22 | Stop reason: HOSPADM

## 2024-06-17 RX ORDER — FENTANYL CITRATE 50 UG/ML
50 INJECTION, SOLUTION INTRAMUSCULAR; INTRAVENOUS EVERY 5 MIN PRN
Status: CANCELLED | OUTPATIENT
Start: 2024-06-17

## 2024-06-17 RX ADMIN — GUAIFENESIN 600 MG: 600 TABLET ORAL at 20:13

## 2024-06-17 RX ADMIN — POTASSIUM CHLORIDE 20 MEQ: 1500 TABLET, EXTENDED RELEASE ORAL at 14:21

## 2024-06-17 RX ADMIN — Medication 12.5 MG: at 14:22

## 2024-06-17 RX ADMIN — ACETAMINOPHEN 650 MG: 325 TABLET, FILM COATED ORAL at 08:39

## 2024-06-17 RX ADMIN — ROPINIROLE HYDROCHLORIDE 2 MG: 2 TABLET, FILM COATED ORAL at 23:03

## 2024-06-17 RX ADMIN — OXYCODONE HYDROCHLORIDE 5 MG: 5 TABLET ORAL at 23:04

## 2024-06-17 RX ADMIN — MAGNESIUM OXIDE TAB 400 MG (241.3 MG ELEMENTAL MG) 400 MG: 400 (241.3 MG) TAB at 20:13

## 2024-06-17 RX ADMIN — Medication 3 MG: at 23:04

## 2024-06-17 RX ADMIN — OXYCODONE HYDROCHLORIDE 5 MG: 5 TABLET ORAL at 12:20

## 2024-06-17 RX ADMIN — ROPINIROLE HYDROCHLORIDE 2 MG: 2 TABLET, FILM COATED ORAL at 14:24

## 2024-06-17 RX ADMIN — LEVOTHYROXINE SODIUM 150 MCG: 75 TABLET ORAL at 06:35

## 2024-06-17 RX ADMIN — ROPINIROLE HYDROCHLORIDE 2 MG: 2 TABLET, FILM COATED ORAL at 20:13

## 2024-06-17 RX ADMIN — LABETALOL HYDROCHLORIDE 20 MG: 5 INJECTION, SOLUTION INTRAVENOUS at 13:15

## 2024-06-17 RX ADMIN — FLUTICASONE FUROATE AND VILANTEROL TRIFENATATE 1 PUFF: 100; 25 POWDER RESPIRATORY (INHALATION) at 08:30

## 2024-06-17 RX ADMIN — GABAPENTIN 400 MG: 400 CAPSULE ORAL at 23:04

## 2024-06-17 RX ADMIN — SUCCINYLCHOLINE CHLORIDE 90 MG: 20 INJECTION, SOLUTION INTRAMUSCULAR; INTRAVENOUS; PARENTERAL at 13:15

## 2024-06-17 RX ADMIN — Medication 100 MG: at 13:15

## 2024-06-17 RX ADMIN — Medication 500 MCG: at 14:22

## 2024-06-17 RX ADMIN — UMECLIDINIUM 1 PUFF: 62.5 AEROSOL, POWDER ORAL at 08:30

## 2024-06-17 RX ADMIN — ALLOPURINOL 300 MG: 300 TABLET ORAL at 20:13

## 2024-06-17 RX ADMIN — GUAIFENESIN 600 MG: 600 TABLET ORAL at 14:22

## 2024-06-17 RX ADMIN — PANTOPRAZOLE SODIUM 40 MG: 40 TABLET, DELAYED RELEASE ORAL at 08:30

## 2024-06-17 RX ADMIN — KETOROLAC TROMETHAMINE 30 MG: 30 INJECTION, SOLUTION INTRAMUSCULAR at 13:04

## 2024-06-17 RX ADMIN — Medication 1000 MG: at 14:22

## 2024-06-17 RX ADMIN — OXYCODONE HYDROCHLORIDE 5 MG: 5 TABLET ORAL at 06:35

## 2024-06-17 RX ADMIN — MAGNESIUM OXIDE TAB 400 MG (241.3 MG ELEMENTAL MG) 400 MG: 400 (241.3 MG) TAB at 14:22

## 2024-06-17 RX ADMIN — OXYCODONE HYDROCHLORIDE 5 MG: 5 TABLET ORAL at 18:08

## 2024-06-17 ASSESSMENT — ACTIVITIES OF DAILY LIVING (ADL)
ORAL_HYGIENE: INDEPENDENT
ADLS_ACUITY_SCORE: 41
HYGIENE/GROOMING: INDEPENDENT
ADLS_ACUITY_SCORE: 41
DRESS: INDEPENDENT
ADLS_ACUITY_SCORE: 41
HYGIENE/GROOMING: INDEPENDENT
ADLS_ACUITY_SCORE: 41
ORAL_HYGIENE: INDEPENDENT
ADLS_ACUITY_SCORE: 41
DRESS: INDEPENDENT

## 2024-06-17 ASSESSMENT — COPD QUESTIONNAIRES: COPD: 1

## 2024-06-17 NOTE — PLAN OF CARE
Assessment/Intervention/Current Symtoms and Care Coordination:  Chart reviewed and patient met with team,   Discussed patient progress, symptomology, and response to treatment.  Discussed the discharge plan and any potential impediments to discharge.     Patient continues course of ECT without complication.  ECT #10 scheuled for today  Mood has been improving subjective and objectively.   Affect has remains brighter. .  Patient has continued to actively participate in groups, has been social with peers/staff.  Discussed last ECT scheduled for 6/20- plan to discharge following recovery that day     Discharge Plan or Goal:  Patient will return home   Psychiatry  Therapy  [facility]/IOP in interim     Barriers to Discharge:  Initiation of ECT- unable to do ECT as outpatient     Referral Status:  [facility] IOP -intake completed 6/6  Therapist- will need referral     Legal Status:  Voluntary     Contacts:  Outpatient Psychiatrist: Jeannette BOSCH [facility]  Therapy: Arsenio MULTANI [facility]- SLP  Primary Physician: Laith Constantino  Family Members: Justin Cleary (Spouse) [phone]     Upcoming Meetings and Dates/Important Information and next steps:  Team update:  Wed     Will need to coordinate with [facility] program re: start date

## 2024-06-17 NOTE — ANESTHESIA POSTPROCEDURE EVALUATION
Patient: Randi Cleary    Procedure: * No procedures listed *       Anesthesia Type:  General    Note:  Disposition: Inpatient   Postop Pain Control: Uneventful            Sign Out: Well controlled pain   PONV: No   Neuro/Psych: Uneventful            Sign Out: Acceptable/Baseline neuro status   Airway/Respiratory: Uneventful            Sign Out: Acceptable/Baseline resp. status   CV/Hemodynamics: Uneventful            Sign Out: Acceptable CV status; No obvious hypovolemia; No obvious fluid overload   Other NRE:    DID A NON-ROUTINE EVENT OCCUR?            Last vitals:  Vitals:    06/17/24 0700 06/17/24 1318 06/17/24 1328   BP:  119/62 112/77   Pulse:  62 76   Resp: 16 16 16   Temp: 36.4  C (97.6  F) 36.3  C (97.4  F) 36.4  C (97.5  F)   SpO2: 94% 92% 95%       Electronically Signed By: Carmen Saldana MD  June 17, 2024  1:34 PM

## 2024-06-17 NOTE — PLAN OF CARE
No PRN given or requested this shift. Pt remained in her room the entire shift. Safety checks completed every 15 minutes; no safety concerns noted. Pt appears to have slept for 6.75 hours; will continue to monitor and offer support.     Problem: Sleep Disturbance  Goal: Adequate Sleep/Rest  Outcome: Progressing   Goal Outcome Evaluation:

## 2024-06-17 NOTE — PROGRESS NOTES
Patient adequate for discharge. Report called to inpatient nurse Yoselin Garcia RN. VSS, A/O, IV removed. Discharged with staff at this time.

## 2024-06-17 NOTE — PLAN OF CARE
"Pt said in community meeting that she felt hopeful.  Pt has ECT tomorrow at 10:40am.  Explained this to pt.  Pt social with select peers. Pt's  came to visit. After visit pt said that her  wants her go to home.  Pt stated that she wants to remain inpatient for rest of her series of ECT's.  Pt denies SI.  Pt continue to state he does not want to discuss if she feels depressed.  Pt affect is often bright. Pt appears eat well.    Problem: Adult Inpatient Plan of Care  Goal: Plan of Care Review  Description: The Plan of Care Review/Shift note should be completed every shift.  The Outcome Evaluation is a brief statement about your assessment that the patient is improving, declining, or no change.  This information will be displayed automatically on your shift  note.  6/16/2024 2213 by Winsome Burns RN  Outcome: Not Progressing  6/16/2024 1331 by Winsome Burns RN  Outcome: Not Progressing  Goal: Patient-Specific Goal (Individualized)  Description: You can add care plan individualizations to a care plan. Examples of Individualization might be:  \"Parent requests to be called daily at 9am for status\", \"I have a hard time hearing out of my right ear\", or \"Do not touch me to wake me up as it startles  me\".  6/16/2024 2213 by Winsome Burns RN  Outcome: Not Progressing  6/16/2024 1331 by Winsome Burns RN  Outcome: Not Progressing  Goal: Absence of Hospital-Acquired Illness or Injury  6/16/2024 2213 by Winsome Burns RN  Outcome: Not Progressing  6/16/2024 1331 by Winsome Burns RN  Outcome: Not Progressing  Goal: Optimal Comfort and Wellbeing  6/16/2024 2213 by Winsome Burns RN  Outcome: Not Progressing  6/16/2024 1331 by Winsome Burns RN  Outcome: Not Progressing  Goal: Readiness for Transition of Care  6/16/2024 2213 by Winsome Burns RN  Outcome: Not Progressing  6/16/2024 1331 by Winsome Burns RN  Outcome: Not Progressing   Goal Outcome Evaluation:                        "

## 2024-06-17 NOTE — PLAN OF CARE
"Pt had ECT this morning. Schedule was a little delayed and she was tearful about it. Pt endorsed anxiety , denied SI and hallucinations.  She was alert and oriented. When she came back from ECT pt denied nausea or pain.  Meal tray was offered and she ate  and hydrated well. Pt was pleasant and cooperative, interacts appropriately with peers. She was appreciative of the cares she receives on the unit. No behavioral concerns.     PRNs: Tylenol    Problem: Adult Behavioral Health Plan of Care  Goal: Patient-Specific Goal (Individualization)  Description: You can add care plan individualizations to a care plan. Examples of Individualization might be:  \"Parent requests to be called daily at 9am for status\", \"I have a hard time hearing out of my right ear\", or \"Do not touch me to wake me up as it startles  me\".  Outcome: Progressing  Goal: Adheres to Safety Considerations for Self and Others  Outcome: Progressing  Intervention: Develop and Maintain Individualized Safety Plan  Recent Flowsheet Documentation  Taken 6/17/2024 1343 by Yoselin Garcia, RN  Safety Measures:   environmental rounds completed   safety rounds completed  Goal: Absence of New-Onset Illness or Injury  Outcome: Progressing  Intervention: Identify and Manage Fall Risk  Recent Flowsheet Documentation  Taken 6/17/2024 1343 by Yoselin Garcia, RN  Safety Measures:   environmental rounds completed   safety rounds completed  Goal: Optimized Coping Skills in Response to Life Stressors  Outcome: Progressing     Problem: Suicide Risk  Goal: Absence of Self-Harm  Outcome: Progressing     Problem: Suicidal Behavior  Goal: Suicidal Behavior is Absent or Managed  Outcome: Progressing     Problem: Sleep Disturbance  Goal: Adequate Sleep/Rest  Outcome: Progressing     Problem: Fall Injury Risk  Goal: Absence of Fall and Fall-Related Injury  Outcome: Progressing   Goal Outcome Evaluation:    Plan of Care Reviewed With: patient                   "

## 2024-06-17 NOTE — ANESTHESIA CARE TRANSFER NOTE
Patient: Randi Cleary    Procedure: * No procedures listed *       Diagnosis: * No pre-op diagnosis entered *  Diagnosis Additional Information: No value filed.    Anesthesia Type:   General     Note:    Oropharynx: spontaneously breathing  Level of Consciousness: drowsy  Oxygen Supplementation: room air    Independent Airway: airway patency satisfactory and stable  Dentition: dentition unchanged  Vital Signs Stable: post-procedure vital signs reviewed and stable  Report to RN Given: handoff report given  Patient transferred to: PACU    Handoff Report: Identifed the Patient, Identified the Reponsible Provider, Reviewed the pertinent medical history, Discussed the surgical course, Reviewed Intra-OP anesthesia mangement and issues during anesthesia, Set expectations for post-procedure period and Allowed opportunity for questions and acknowledgement of understanding      Vitals:  Vitals Value Taken Time   /77 06/17/24 1328   Temp 36.4  C (97.5  F) 06/17/24 1328   Pulse 76 06/17/24 1328   Resp 16 06/17/24 1328   SpO2 95 % 06/17/24 1328       Electronically Signed By: Carmen Saldana MD  June 17, 2024  1:34 PM

## 2024-06-17 NOTE — ANESTHESIA PREPROCEDURE EVALUATION
Anesthesia Pre-Procedure Evaluation    Patient: Randi Cleary   MRN: [id number] : 1955        Procedure : *ECT          Past Medical History:   Diagnosis Date    Bipolar 2 disorder (H)     Breast cancer (H)     lumpectomy, radiation, chemo    Chronic pain syndrome     COPD (chronic obstructive pulmonary disease) (H)     asthma    Cord compression (H) 2021    Dizzy     Drug tolerance     opioid    Esophageal reflux     Fatigue     Generalized anxiety disorder     Graves disease     Hemochromatosis 2018    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; H63D not detected    History of breast cancer 2020    Formatting of this note might be different from the original. Created by Conversion  Replacement Utility updated for latest IMO load Formatting of this note might be different from the original. Created by Conversion  Replacement Utility updated for latest IMO load    History of corticosteroid therapy 2019    History of partial adrenalectomy (H24) 2019    History of pheochromocytoma 2019    Hx antineoplastic chemotherapy     Hx of radiation therapy     Hyperlipidaemia     Hypertension     Impaired fasting glucose     Injury of neck, whiplash 07/15/2021    Joint pain     KYAW (obstructive sleep apnea) 2016    Osteopenia     Pheochromocytoma, left 2017    laparoscopically removed    Postablative hypothyroidism 1995    Prediabetes 10/03/2019    by A1c    Psoriasis     Psoriatic arthropathy (H)     Right rotator cuff tear     RLS (restless legs syndrome)     on ropinorole    Sacroiliitis (H24)     Serotonin syndrome 2020    [facility] - While on desvenlafaxine 100mg    Snoring     Spinal stenosis     Status post coronary angiogram 10/03/2019    Urinary incontinence     Vitamin B 12 deficiency 2009    Vitamin D deficiency 2010      Past Surgical History:   Procedure Laterality Date    ARTHRODESIS ANKLE      ARTHROPLASTY ANKLE Right 2015    Procedure: ARTHROPLASTY  ANKLE;  Surgeon: Jason Coughlin MD;  Location: [facility]    ARTHROPLASTY REVISION ANKLE Right 6/29/2015    Procedure: ARTHROPLASTY REVISION ANKLE;  Surgeon: Jason Coughlin MD;  Location: [facility]    BIOPSY BREAST      BREAST BIOPSY, CORE RT/LT      COLONOSCOPY      COLONOSCOPY N/A 2/25/2021    Procedure: COLONOSCOPY;  Surgeon: Guru Elke Tolbert MD;  Location:  GI    CV CORONARY ANGIOGRAM N/A 10/3/2019    Procedure: CV CORONARY ANGIOGRAM;  Surgeon: Bryce Pierre MD;  Location:  HEART CARDIAC CATH LAB    CV RIGHT HEART CATH MEASUREMENTS RECORDED N/A 10/3/2019    Procedure: CV RIGHT HEART CATH;  Surgeon: Bryce Pierre MD;  Location:  HEART CARDIAC CATH LAB    ESOPHAGOSCOPY, GASTROSCOPY, DUODENOSCOPY (EGD), COMBINED N/A 2/25/2021    Procedure: ESOPHAGOGASTRODUODENOSCOPY, WITH BIOPSY;  Surgeon: Guru Elke Tolbert MD;  Location:  GI    EYE SURGERY  2021    HC REMOVE TONSILS/ADENOIDS,<11 Y/O      Description: Tonsillectomy With Adenoidectomy;  Recorded: 04/07/2010;    IR LUMBAR EPIDURAL STEROID INJECTION  10/26/2004    IR LUMBAR EPIDURAL STEROID INJECTION  11/16/2004    IR LUMBAR EPIDURAL STEROID INJECTION  12/21/2004    IR LUMBAR EPIDURAL STEROID INJECTION  6/8/2006    JOINT REPLACEMENT      LAMINOPLASTY CERVICAL POSTERIOR THREE+ LEVELS Left 12/21/2021    Procedure: CERVICAL 3-CERVICAL 6 LEFT OPEN DOOR LAMINOPLASTY AND LEFT CERVICAL 4-5 AND CERVICAL 6-7 POSTERIOR FORAMINOTOMY;  Surgeon: Angela Gregory MD;  Location: [facility]    LAPAROSCOPIC ADRENALECTOMY Left 08/02/2017    pheochromocytoma    LAPAROSCOPIC ADRENALECTOMY Left 8/2/2017    Procedure: LAPAROSCOPIC LEFT ADRENALECTOMY, ;  Surgeon: Gab Linares MD;  Location: [facility];  Service:     LENGTHEN TENDON ACHILLES Right 6/29/2015    Procedure: LENGTHEN TENDON ACHILLES;  Surgeon: Jason Coughlin MD;  Location: [facility]    LUMPECTOMY BREAST      LUMPECTOMY BREAST Left 1994  "   MAMMOPLASTY REDUCTION Right 2015    [place]    MAMMOPLASTY REDUCTION Right     approx late /    MASTECTOMY      left lumpectomy with axillary node dissection    MASTECTOMY MODIFIED RADICAL      OTHER SURGICAL HISTORY Right     reconstructive breast surgery    OTHER SURGICAL HISTORY      Adrenalectomy for pheochromocytoma    MI MASTECTOMY, MODIFIED RADICAL      Description: Modified Radical Mastectomy Left Breast;  Recorded: 2010;    REPAIR HAMMER TOE Right 2015    Procedure: REPAIR HAMMER TOE;  Surgeon: Jason Coughlin MD;  Location: [facility]    TONSILLECTOMY      TONSILLECTOMY & ADENOIDECTOMY      ZC ARTHRODESIS,ANKLE,OPEN Right     Description: Ankle Arthrodesis;  Recorded: 2010;      Allergies   Allergen Reactions    Serotonin Reuptake Inhibitors (Ssris) Anxiety, Difficulty breathing, Headache, Palpitations and Shortness Of Breath    Buspirone      The patient states she had serotonin syndrome    Cephalexin      Other reaction(s): unknown rxn.    Desvenlafaxine      Serotonin syndrome    Diclofenac Sodium [Diclofenac]      Serotonin syndrome and restless legs syndrome    Gabapentin      Drove on the wrong side of the highway    Levofloxacin      \"CAN'T REMEMBER\"    Penicillins      \"SORES IN MOUTH\"    Riluzole Difficulty breathing and Swelling    Sulfa Antibiotics      \"PT DOES NOT KNOW WHAT THE REACTION WAS\"    Topiramate Other (See Comments)     Frequent urination      Social History     Tobacco Use    Smoking status: Former     Current packs/day: 0.00     Average packs/day: 2.5 packs/day for 29.2 years (72.9 ttl pk-yrs)     Types: Cigarettes     Start date: 1971     Quit date: 2000     Years since quittin.9     Passive exposure: Past    Smokeless tobacco: Never   Substance Use Topics    Alcohol use: Not Currently     Comment: relapse 2021 sober       Wt Readings from Last 1 Encounters:   24 87 kg (191 lb 14.4 oz)        Anesthesia Evaluation   Pt " has had prior anesthetic.     No history of anesthetic complications       ROS/MED HX  ENT/Pulmonary:     (+) sleep apnea, uses CPAP,                        COPD,              Neurologic:       Cardiovascular: Comment: Pulmonary HTN.  Followed by Cardiology.  Next right heart cath scheduled for 6/19/2024.    (+)  hypertension- -   -  - -                                 Previous cardiac testing   Echo: Date: Results:    Stress Test:  Date: Results:    ECG Reviewed:  Date: 5/21/2024 Results:  RBBB, sinus rhythm  Cath:  Date: Results:      METS/Exercise Tolerance:     Hematologic:       Musculoskeletal: Comment: S/P Cervical Surgery - laminectomy   SIJ pain & joint pain  Osteopenia      GI/Hepatic: Comment: Hepatic steatosis    (+) GERD,                   Renal/Genitourinary:       Endo: Comment: Graves Disease. S/P Radioiodine Tx.    Hx of pheochromocytoma, S/P Left Adrenalectomy 8/2017    (+)          thyroid problem,     Obesity,       Psychiatric/Substance Use: Comment: MDD, recurrent, severe, with anxious distress      Infectious Disease:       Malignancy:   (+) Malignancy, History of Breast.Breast CA Remission status post Surgery and Chemo.      Other:            Physical Exam    Airway  airway exam normal      Mallampati: II   TM distance: > 3 FB   Neck ROM: full   Mouth opening: > 3 cm    Respiratory Devices and Support         Dental       (+) Minor Abnormalities - some fillings, tiny chips      Cardiovascular   cardiovascular exam normal          Pulmonary   pulmonary exam normal            Other findings: S/P Cervical Surgery, good ROM  OUTSIDE LABS:  CBC:   Lab Results   Component Value Date    WBC 7.9 06/04/2024    WBC 7.1 05/22/2024    HGB 17.0 (H) 06/04/2024    HGB 17.7 (H) 05/22/2024    HCT 48.1 (H) 06/04/2024    HCT 49.9 (H) 05/22/2024     06/04/2024     05/22/2024     BMP:   Lab Results   Component Value Date     06/04/2024     05/22/2024    POTASSIUM 4.3 06/04/2024     POTASSIUM 5.2 06/04/2024    CHLORIDE 103 06/04/2024    CHLORIDE 104 05/22/2024    CO2 21 (L) 06/04/2024    CO2 24 05/22/2024    BUN 17.3 06/04/2024    BUN 9.3 05/22/2024    CR 0.53 06/04/2024    CR 0.57 05/22/2024    GLC 96 06/04/2024     (H) 05/22/2024     COAGS:   Lab Results   Component Value Date    PTT 34 12/13/2021    INR 0.94 12/13/2021     POC:   Lab Results   Component Value Date     (H) 02/25/2021     HEPATIC:   Lab Results   Component Value Date    ALBUMIN 3.7 06/04/2024    PROTTOTAL 7.2 06/04/2024    ALT 33 06/04/2024    AST 35 06/04/2024    ALKPHOS 80 06/04/2024    BILITOTAL 0.4 06/04/2024     OTHER:   Lab Results   Component Value Date    A1C 5.6 05/22/2024    LINDA 9.5 06/04/2024    MAG 1.9 05/22/2024    TSH 1.69 06/04/2024    T4 1.49 03/29/2024    T3 114 01/18/2023    CRP <2.9 11/16/2021    SED 8 10/17/2023       Anesthesia Plan    ASA Status:  3    NPO Status:  NPO Appropriate    Anesthesia Type: General.     - Airway: Mask Only   Induction: Intravenous.   Maintenance: N/A.        Consents    Anesthesia Plan(s) and associated risks, benefits, and realistic alternatives discussed. Questions answered and patient/representative(s) expressed understanding.     - Discussed: Risks, Benefits and Alternatives for BOTH SEDATION and the PROCEDURE were discussed     - Discussed with:  Patient      - Extended Intubation/Ventilatory Support Discussed: No.      - Patient is DNR/DNI Status: No     Use of blood products discussed: No .     Postoperative Care    Pain management: Oral pain medications.   PONV prophylaxis: Ondansetron (or other 5HT-3)     Comments:    Other Comments: ECT           Carmen Saldana MD    I have reviewed the pertinent notes and labs in the chart from the past 30 days and (re)examined the patient.  Any updates or changes from those notes are reflected in this note.

## 2024-06-17 NOTE — PLAN OF CARE
Individual Therapy Note    Session duration in minutes: 5    Pt progress: Checked in with Pt in dining area, Pt was having her lunch late d/t ECT. Pt was immediately tearful reporting she is not having a good day. Pt did share she feels ECT is helping - just today nothing seems to be going right. Writer offered her words of encouragement that Pt reports finding helpful. Will continue to check in with Pt.        No Charge (0-15 min)

## 2024-06-17 NOTE — PLAN OF CARE
BEH IP Unit Acuity Rating Score (UARS)  Patient is given one point for every criteria they meet.    CRITERIA SCORING   On a 72 hour hold, court hold, committed, stay of commitment, or revocation. 0    Patient LOS on BEH unit exceeds 20 days. 1  LOS: 27   Patient under guardianship, 55+, otherwise medically complex, or under age 11. 1   Suicide ideation without relief of precipitating factors. 0   Current plan for suicide. 0   Current plan for homicide. 0   Imminent risk or actual attempt to seriously harm another without relief of factors precipitating the attempt. 0   Severe dysfunction in daily living (ex: complete neglect for self care, extreme disruption in vegetative function, extreme deterioration in social interactions). 1   Recent (last 7 days) or current physical aggression in the ED or on unit. 0   Restraints or seclusion episode in past 72 hours. 0   Recent (last 7 days) or current verbal aggression, agitation, yelling, etc., while in the ED or unit. 0   Active psychosis. 0   Need for constant or near constant redirection (from leaving, from others, etc).  0   Intrusive or disruptive behaviors. 0   Patient requires 3 or more hours of individualized nursing care per 8-hour shift (i.e. for ADLs, meds, therapeutic interventions). 0   TOTAL 3

## 2024-06-17 NOTE — PROGRESS NOTES
Pt arrived back to the unit from ECT at about 2 PM. Denies  nausea or pain. Pt was offered her meal tray and she ate well.

## 2024-06-17 NOTE — PROCEDURES
"Procedures  [facility], [place]   ECT Procedure Note   06/17/2024    Randi Cleary is a 68 year old female patient.  [id number]    Patient Status: IN-patient    Is this the first in a series of 12 treatments?  No      Allergies   Allergen Reactions    Serotonin Reuptake Inhibitors (Ssris) Anxiety, Difficulty breathing, Headache, Palpitations and Shortness Of Breath    Buspirone      The patient states she had serotonin syndrome    Cephalexin      Other reaction(s): unknown rxn.    Desvenlafaxine      Serotonin syndrome    Diclofenac Sodium [Diclofenac]      Serotonin syndrome and restless legs syndrome    Gabapentin      Drove on the wrong side of the highway    Levofloxacin      \"CAN'T REMEMBER\"    Penicillins      \"SORES IN MOUTH\"    Riluzole Difficulty breathing and Swelling    Sulfa Antibiotics      \"PT DOES NOT KNOW WHAT THE REACTION WAS\"    Topiramate Other (See Comments)     Frequent urination       Weight:  191 lbs 14.4 oz / 87 kg          Indications for ECT:   Medications ineffective and Psychotherapies ineffective         Clinical Narrative:   HPI - The patient describes a lifelong history of depression dating back to elementary school, worsening after her father's death when she was 13yo and especially in the 1980s in the setting of worsening physical health (since falling and breaking her ankle), which led to the the initiation of fluoxetine, her first antidepressant.  Her history has been primarily characterized by low mood, with some ups and downs but no extended depression-free periods since then.  She has tried many different medications from different classes, but cannot recall any one being particularly helpful for her.  She has experienced past episodes of particularly irritable mood and concomitant decreased sleep need, although most of these have lasted 1-2 days and triggered by anger related to external stressors.  She has at least one episode of a more " "extended episode of elevated mood (and associated grandiosity, increased spending, flight of ideas, increased goal directed behaviors, pressured speech, and odd and embarrassing behavior), which occurred in the context of relapse on alcohol in 2021. She does not endorse any events before about age 48.     The patient's depression is characterized by near daily low mood, frequent anhedonia, with sleep difficulties (although c/b pain, KYAW, and RLS), fatigue, feelings of guilt/worthlessness, and impaired concentration.  She reports feelings of \"despair,\" at times with active SI with plan, but denies any intent to act on this - \"maybe it's hope.\"  She has 1 lifetime suicide attempt (overdose) in the 1980s, for which she was psychiatrically hospitalized.  She has a remote history of SIB (cutting) but not recently.       Psych pertinent item history includes includes suicide attempt , suicidal ideation, SIB , aggression, trauma hx, substance use: alcohol, cannabis, and Patient has a history of alcohol dependence treated 20+ years ago and she relapsed in 2020 for a couple of months, in her 20s she\" loved getting high\" on cocaine, LSD, mushrooms, speed, white cross, mutiple psychotropic trials , psych hosp, ketamine, and major medical problems.    Target Symptoms for Improvement: Amotivation, Fatigue, Improved self-image and Panic attacks         Diagnosis:   Major depression         Assessment:   #1 05/24/24 Some circumstantial thought, needs some redirection, 3.5 hours sleep last night, anxious, mood 1/10, PDW but no SI, never had ECT before - only TMS. No AVH.   #2 05/29/24  Mood 4/10, better over w/e, some word find difficulties, no AVH/SI/PDW. Chronic sciatica pain, neck and shoulder pain - didn't worsen with ECT. Mild headache.   #3 05/31/24  Mood 4/10, No SI, PDW, AVH, some wrist pain and headache, memory might be improving.    #4 6/3/24  Phq-9= 13, mood 3/10.  Yesterday was very tearful, still a bit today.  Last " "treatment had awareness under paralysis.  Otherwise, some initial confusion after first ECT but no subsequent cognitive effects.  Signed consent to continue.  #5 6/5/24 Mood 4-5/10  She feels like her \"rage\" is less and she feels lighter. but no cognitive side effects. She complains of excessive sweating and is concerned her thryoid is unbalanced. She is somatically focused She reports pain on the side of her neck radiating down to her chest. She had a migraine she thinks over the weekend which usual starts as occipital tension.  #6 6/7/24 mood 4-5/10. No SI. She does have some baseline long term memory difficulties no AVH.   #7 6/10/24  She still is worried that She is not able to go home. She had visitors this weekend who said \"you don't seem to have the weight of the world on your shoulders anymore\" she disagrees she had a downward spiral over the weekend when there was a mix up with her clothes and she usually feels tearful after ECT. She does not report anything feeling worse. She gets down on herself when she has an anxiety spiral and it was the 1st one she has had since admission.   #8 6/12/24 Winnie reported some tearfulness overnight. She is noticing a weight lifting mood 6/10 . Reports some difficulty with remembering names but believes this is at baseline.   #9 6/14/24 Mood 5/10 (10 best) She feels like she is improving each time . She still has occasional crying spells but she feels she bounces back quicker. She is still anxious about going home. No Si. Tolerating ECT  #10 06/17/24 mood 5/10 She does feellike she has gotten some improvement since starting Ect but still has times where she gets tearful and anxious \"goes down the rabit hole\" but not as often . She has had ketamine troches before with pain  management to no effect but wonders about ketamine infusions       Pause for the Cause:     Correct patient Yes   Correct procedure/laterality settings: Yes           Intra-Procedure Documentation:     ECT " #: 10   Treatment number this series: 10   Total treatment number: 10     Type of ECT:  Right, unilateral ultrabrief    ECT Medications:    Toradol 30mg iv - for headache/myalgia     Brevital: 100 mg (incr from 90 mg as still awake)   Succinyl Choline: 90 mg    BP - nicardipine 1500 mcg         ECT Strip Summary: RUL Titration #3: 38.4 mC   Energy Level:  345mC, 0.3 ms, 90 Hz, 8 sec, 800 mA     Motor Seizure Duration: 15 seconds  EEG Seizure Duration: 32seconds     Complications: none  Plan:   - Continue RUL ECT - Q MWF    - Monitor depression severity with clinical assessment augmented with PHQ9 every other treatment  - Continue current medications    Discharge instructions:   - Continue acute ECT    - Per Dr Varela:   - Consider trial of serotonin modulator (e.g., vilazodone vs. vortioxetine) or novel agent Auvelity  - Consider augmentation with atypical antipsychotic (e.g., quetiapine or aripiprazole), though there are concerns given pre-diabetes      Toan Cotter MD  Dept of Psychiatry

## 2024-06-17 NOTE — PROGRESS NOTES
"  ----------------------------------------------------------------------------------------------------------  [facility]  Psychiatry Progress Note  Hospital Day #27     Subjective:     Patient Interview: Winnie was interviewed in her room. She has not been using her TENS unit for a few days so her neck and shoulders have been hurting since ECT on Friday. Says her  is excited for her to come home. When asked about her mood and anxiety she sighed and said \"to be honest with you, I dont know, I said I would do the 12 ECT, I dont know if I want to go beyond that\". She discussed recently learning about ketamine and expressed some interest in learning about this. Is interested in the IV form of ketamine. Feeling anxious before ECT today.   Discussed possibility of doing day program in the time between discharge and starting in the 55+ group, which has an 8-9 week waiting list. Winnie seemed receptive to this idea.     Objective:     Vitals:  /73 (BP Location: Right arm, Patient Position: Sitting, Cuff Size: Adult Regular)   Pulse 92   Temp 97.6  F (36.4  C) (Oral)   Resp 16   Ht 1.676 m (5' 6\")   Wt 87 kg (191 lb 14.4 oz)   LMP  (LMP Unknown)   SpO2 94%   BMI 30.97 kg/m      Mental Status Exam:  Oriented to:  Grossly Oriented  General:  Awake and Alert  Appearance:  appears stated age and Grooming is adequate  Behavior/Attitude:  cooperative and open, conversational  Eye Contact:  appropriate  Psychomotor: normal and no evidence of tics, dystonia, or tardive dyskinesia no catatonia present  Speech:  appropriate volume/tone and talkative  Language: Fluent in English with appropriate syntax and vocabulary.  Mood:  \"good\" \"anxious\"  Affect: congruent with mood, cheerful/teary depending on topic  Thought Process:  coherent and goal directed  Thought Content:  No HI, No VH, and No AH, no SI; No apparent delusions  Associations:  intact  Insight:  fair due to " recognizing the circumstances of her mental health  Judgment:  fair due to willingness to engage in ECT  Impulse control: good  Attention Span:  grossly intact  Concentration:  grossly intact  Recent and Remote Memory:  not formally assessed  Fund of Knowledge:  average  Muscle Strength and Tone: normal  Gait and Station: Normal     Psychiatric Assessment     Randi Cleary is a 68 year old female previously diagnosed with MDD, recurrent severe, substance induced mood disorder, Unspecified Trauma, CHAVO, borderline personality disorder and alcohol use disorder who presented voluntarily with SI in the context of treatment resistant MDD. On admission she presented with significant symptoms of depression incuding emotional lability, low mood, hopelessness, amotivation, anxiety, anhedonia, low energy, insomnia low appetite, excess guilt and poor concentration. Her affect was dysphroic and anxious at times with heavy perseveration on medico-surgical dignosises and passive SI causing incresed anxiety. She notes she has had a plan but no intent. Her history of extensive substance use, medical history and chronic pain contribute biologically. Her presentation is further complicated by borderline personality disorder and history of trauma. Additionally, she has a very limited support system. However her last hospitalization was in the 1980's for a suicide attempt via Prozac overdose and has been engaged in treatment and present voluntarily. Her presentation is consistent with decompensated Major Depressive Disorder, recurrent severe. Her disorder has been treatment resistant, including to TMS, and at this time, ECT is be the best course of action to address her symptom severity. Patient warrants inpatient psychiatric hospitalization to maintain her safety. Given her improvement with each ECT treatment thus far and her concerns about the stresses of her home life, it is reasonable to keep her inpatient until she completes  the full course of ECT.     Psychiatric Plan by Diagnosis      Today's changes:  - ECT #10 today      # MDD, recurrent, severe, with anxious distress  - Patient suffers from treatment resistant depression and has trialed many medications. At this point treatment team will need to do a chart review to review medication trials and determine the best medication to address depressive symptoms long term  - Pt started ECT 5/24 : Hold high-dose gabapentin/pregabalin after 6pm on the day before ECT (to permit adequate seizure)   - Continue ECT MWF     #Insomnia  - Zolpidem 5mg qpm prn    Pertinent Labs/Monitoring:   - EKG 5/22 - Qtc 458 NSR, RBBB     Additional Plans:  - Patient will be treated in therapeutic milieu with appropriate individual and group therapies as described  - Pt considering day program [facility]      Psychiatric Hospital Course:      Randi Cleary was admitted to [facility] as a voluntary patient for electroconvulsive therapy for lifelong history of depression. Multiple medication trials were not effective. Symptoms notable for low mood, anhedonia, sleep difficulties, guilt and worthlessness, suicidal ideation with plan without intent. Continued on home meds with ECT while inpt. Did have an episode early on in ECT series while here where muscle paralytic was given before sedation. Pt was understandably agitated from this but was able to pursue further ECT as she found it helpful overall in due course.   Today is her 10th ECT session. This treatment has been effective in managing her symptoms this admission.     The risks, benefits, alternatives, and side effects were discussed and understood by the patient.     Medical Assessment and Plan     Medical diagnoses to be addressed this admission:    # Hypothyroidism  - PTA 150mcg M-Sa, 75mcg on Perez     # KYAW   - Doesn't use CPAP at home, sleep study on 06/2024      # RLS  - Continue PTA Ropinirole 2 mg PO TID  - Gabapentin  400mg qpm  - Magnesium Citrated changed to Magnesium Oxide 400mg BID (per patient and medicine)    #RBBB:  Previously on other EKGs  CTM    #Cervical radiculopathy and myelopathy s/p cervical laminoplasty 12/2021    # Chronic Pain  - Continue PTA Roxicodone 5 mg q6h + 5 mg PRN (for max daily dose of 25 mg)  - Menthol 2.5% topical gel q6hrs prn  - TENS Unit     #Dizziness  #Headache  Reports hx of migraines, no vision changes or hearing changes. notes this has been ongoing since she had her spinal surgery.   - CT head 5/22 unremarkable    #Gout  - Nutrition consult  - Allopurinol 300mg qpm  - Flares in left podagra but none currently    #Vitamin B12 Deficiency  - Vit B12 500mcg qday    #Vitamin C Deficiency  - Ascorbic Acid 1000mg    #Vitamin D Deficiency  - 250mcg weekly    #COPD  - Breo Ellipta 1 puff daily   - Incruse Ellipta 1 puff daily  - Tessalon cap 200mg TID prn  - Albuterol 2 puffs q6hr prn  - Mucinex 1200mg BID prn     #GERD  #Hiatal Hernia  - Protonix 40mg daily    #Possible pulmonary HTN  #HTN  Per Medicine note, patient follows with cardiology here. She had a right heart catheterization scheduled for 06/12/24, but Winnie said she would call to cancel it on 06/11/24 as she was still inpatient on the unit and had ECT scheduled.  PTA ethacrynic acid 12.5mg daily and potassium supplement. Electrolytes and renal function stable. Recently lost 50lbs and BP has improved.   - Ethacrynic Acid 12.5mg qday   - Edecrin 12.5mg daily   - Continue potassium supplement   - Klorcon 20meq daily     #Hemochromatosis, hereditary  - Hgb Stable at ~17    #Hepatic Steatosis  - Stable  - LFTs wnl, no abdominal pain, distention, N/V    #History of breast cancer s/p mastectomy, chemo and XRT:   - currently in remission      #Alcohol use disorder, in remission:   - No current use. Two years sober     #Hx of pheochromocytoma s/p left adrenalectomy 08/2017:   - Stable  - Follows Endocrinology for this.     #Psoriasis:  - Noted on R  hang  - Uses scalp brush    Medical course: Patient was physically examined by the ED prior to being transferred to the unit and was found to be medically stable and appropriate for admission. Medicine consult was done to provide clearance for ECT. Due to patient's persistent neck pain after surgery in 2021, CT Head was done which was negative. Oxycodone changed from q6hr prn to scheduled with an extra 5mg in the day as needed per PTA regimen. Patient continued to express some nerve pain so gabapentin was increased to 400mg.     Medical course: Patient was physically examined by the ED prior to being transferred to the unit and was found to be medically stable and appropriate for admission.     Consults: Dietician, Medicine for ECT Clearance, ECT Consult, Nutrition     Checklist     Legal Status: Voluntary     Safety Assessment:   Behavioral Orders   Procedures    Code 1 - Restrict to Unit    Code 2 - 1:1 Staff Supervision     For coming down to ECT only    Discontinue 1:1 attendant for suicide risk     Order Specific Question:   I have performed an in person assessment of the patient     Answer:   Based on this assessment the patient no longer requires a one on one attendant at this point in time.     Order Specific Question:   Rationale     Answer:   Patient States able to remain safe in hospital     Order Specific Question:   Rationale     Answer:   Modifications to care environment made to mitigate safety risk     Order Specific Question:   Rationale     Answer:   Routine observations are sufficient to monitor safety.    Electroconvulsive therapy     Series of up to 12 treatments. Begin Date: 5/24/24     Treating Psychiatrist providing ECT:  Ruthann     Notified on:  5/21/24    Electroconvulsive therapy     Series of up to 12 treatments. Begin Date: 5/24/24     Treating Psychiatrist providing ECT:  Ruthann     Notified on:  5/21/24    Fall precautions    Routine Programming     As clinically indicated    Status  15     Every 15 minutes.    Suicide precautions: Suicide Risk: MODERATE; Clinical rationale to override score: lack of access to a plan for self-harm     Order Specific Question:   Suicide Risk     Answer:   MODERATE     Order Specific Question:   Clinical rationale to override score:     Answer:   lack of access to a plan for self-harm       Risk Assessment:  Risk for harm is moderate.  Risk factors: SI, maladaptive coping, trauma, and past behaviors  Protective factors: engaged in treatment     SIO: Discontinued    Disposition: Pending stabilization, medication optimization, & development of a safe discharge plan. Pt has been accepted to 55 plus program once stable. Likely discharge is at end of ECT course, which is scheduled to be Friday, June 21.      Attestations      Sung Myers, MS3     Resident Attestation:   I was present with the medical student who participated in the service and in the documentation of the note.  I have verified the history and personally performed the physical exam, mental status exam, and medical decision making. I agree with the assessment and plan of care as documented in the note.     Yennifer Martinez MD  Psychiatry Resident Physician    Attestation:  This patient has been seen and evaluated by me, Jairo Choudhary MD.  I have discussed this patient with the house staff team including the resident and medical student and I agree with the findings and plan in this note.    I have reviewed today's vital signs, medications, labs and imaging. Jairo Choudhary MD , PhD.

## 2024-06-18 PROCEDURE — 250N000013 HC RX MED GY IP 250 OP 250 PS 637

## 2024-06-18 PROCEDURE — 250N000013 HC RX MED GY IP 250 OP 250 PS 637: Performed by: PHYSICIAN ASSISTANT

## 2024-06-18 PROCEDURE — 250N000013 HC RX MED GY IP 250 OP 250 PS 637: Performed by: PSYCHIATRY & NEUROLOGY

## 2024-06-18 PROCEDURE — G0177 OPPS/PHP; TRAIN & EDUC SERV: HCPCS

## 2024-06-18 PROCEDURE — 124N000002 HC R&B MH UMMC

## 2024-06-18 PROCEDURE — 90853 GROUP PSYCHOTHERAPY: CPT

## 2024-06-18 PROCEDURE — 99232 SBSQ HOSP IP/OBS MODERATE 35: CPT | Mod: GC | Performed by: PSYCHIATRY & NEUROLOGY

## 2024-06-18 RX ADMIN — ROPINIROLE HYDROCHLORIDE 2 MG: 2 TABLET, FILM COATED ORAL at 20:15

## 2024-06-18 RX ADMIN — Medication 12.5 MG: at 08:41

## 2024-06-18 RX ADMIN — ROPINIROLE HYDROCHLORIDE 2 MG: 2 TABLET, FILM COATED ORAL at 14:22

## 2024-06-18 RX ADMIN — Medication 1000 MG: at 08:42

## 2024-06-18 RX ADMIN — GUAIFENESIN 600 MG: 600 TABLET ORAL at 08:42

## 2024-06-18 RX ADMIN — POTASSIUM CHLORIDE 20 MEQ: 1500 TABLET, EXTENDED RELEASE ORAL at 08:42

## 2024-06-18 RX ADMIN — ACETAMINOPHEN 650 MG: 325 TABLET, FILM COATED ORAL at 14:21

## 2024-06-18 RX ADMIN — Medication 3 MG: at 23:15

## 2024-06-18 RX ADMIN — ROPINIROLE HYDROCHLORIDE 2 MG: 2 TABLET, FILM COATED ORAL at 23:15

## 2024-06-18 RX ADMIN — OXYCODONE HYDROCHLORIDE 5 MG: 5 TABLET ORAL at 18:01

## 2024-06-18 RX ADMIN — LEVOTHYROXINE SODIUM 150 MCG: 75 TABLET ORAL at 07:00

## 2024-06-18 RX ADMIN — ALLOPURINOL 300 MG: 300 TABLET ORAL at 20:15

## 2024-06-18 RX ADMIN — UMECLIDINIUM 1 PUFF: 62.5 AEROSOL, POWDER ORAL at 08:18

## 2024-06-18 RX ADMIN — MAGNESIUM OXIDE TAB 400 MG (241.3 MG ELEMENTAL MG) 400 MG: 400 (241.3 MG) TAB at 20:15

## 2024-06-18 RX ADMIN — FLUTICASONE FUROATE AND VILANTEROL TRIFENATATE 1 PUFF: 100; 25 POWDER RESPIRATORY (INHALATION) at 08:18

## 2024-06-18 RX ADMIN — OXYCODONE HYDROCHLORIDE 5 MG: 5 TABLET ORAL at 07:00

## 2024-06-18 RX ADMIN — OXYCODONE HYDROCHLORIDE 5 MG: 5 TABLET ORAL at 01:17

## 2024-06-18 RX ADMIN — Medication 500 MCG: at 08:42

## 2024-06-18 RX ADMIN — PANTOPRAZOLE SODIUM 40 MG: 40 TABLET, DELAYED RELEASE ORAL at 08:42

## 2024-06-18 RX ADMIN — OXYCODONE HYDROCHLORIDE 5 MG: 5 TABLET ORAL at 23:15

## 2024-06-18 RX ADMIN — GABAPENTIN 100 MG: 100 CAPSULE ORAL at 14:22

## 2024-06-18 RX ADMIN — OXYCODONE HYDROCHLORIDE 5 MG: 5 TABLET ORAL at 13:11

## 2024-06-18 RX ADMIN — GUAIFENESIN 600 MG: 600 TABLET ORAL at 20:15

## 2024-06-18 RX ADMIN — MAGNESIUM OXIDE TAB 400 MG (241.3 MG ELEMENTAL MG) 400 MG: 400 (241.3 MG) TAB at 08:42

## 2024-06-18 ASSESSMENT — ACTIVITIES OF DAILY LIVING (ADL)
ADLS_ACUITY_SCORE: 41
HYGIENE/GROOMING: INDEPENDENT
ADLS_ACUITY_SCORE: 41
ORAL_HYGIENE: INDEPENDENT
ADLS_ACUITY_SCORE: 41
LAUNDRY: WITH SUPERVISION
ADLS_ACUITY_SCORE: 41
DRESS: INDEPENDENT

## 2024-06-18 NOTE — PLAN OF CARE
Group Attendance:  attended full group    Time session began: 1415  Time session ended: 1500  Patient's total time in group: 45    Total # Attendees   4   Group Type psychotherapeutic     Group Topic Covered healthy coping skills  One-mindfully: in-the-moment      Group Session Detail Patients participated in meditation exercises including listening to guided meditation; drawing with [employer]; measured breathing; then processing the experience.     Patient's response to the group topic/interactions:  positive affect, cooperative with task, supportive of peers, socially appropriately, safe use of materials/supplies, listened actively, attentive, and engaged socially when prompted     Patient Details: Patient was an active participant with a positive attitude and respect toward other group members. She was able to describe how the experience was for her and how she may use the skills in future.           23047 - Group psychotherapy - 1 Session    Patient Active Problem List   Diagnosis    DJD (degenerative joint disease), ankle and foot    Hereditary hemochromatosis (H24)    Pulmonary hypertension (H)    Postablative hypothyroidism    Hx of corticosteroid therapy    Class 2 obesity due to excess calories without serious comorbidity in adult    Prediabetes    KYAW (obstructive sleep apnea)    Type 2 diabetes mellitus without complication, without long-term current use of insulin (H)    Hypokalemia    COPD (chronic obstructive pulmonary disease) (H)    Generalized anxiety disorder    Restless leg syndrome    Vitamin B12 deficiency    Vitamin D deficiency    Morbid obesity (H)    Cord compression (H)    History of cervical spinal surgery    Cervical stenosis of spinal canal    Alcohol use disorder, moderate, in sustained remission (H)    Essential hypertension    Psoriatic arthropathy (H)    Primary osteoarthritis involving multiple joints    Gastroesophageal reflux disease with esophagitis without hemorrhage     Numbness in both hands    Chronic, continuous use of opioids    Trauma and stressor-related disorder    Post-menopausal    Suicidal ideation    Depression with anxiety    Severe recurrent major depression without psychotic features (H)

## 2024-06-18 NOTE — PROGRESS NOTES
Rehab Group    Start time: 1315  End time: 1415  Patient time total: 60 minutes    attended full group    #5 attended   Group Type: occupational therapy   Group Topic Covered: cognitive activities, healthy leisure time, and problem solving     Group Session Detail:  Visual-spatial group game     Patient Response/Contribution:  cooperative with task, attentive, and actively engaged     Patient Detail:    Pt actively participated in a structured occupational therapy group with a focus on visuospatial problem solving and social engagement via a group game. Pt demonstrated understanding of the familiar 3-step task after an initial explanation. Pt remained focused and engaged throughout the full duration of group. Receptive to and implemented strategic suggestions. Affect appeared to brighten in interactions.         Train & Education Service Per Session 45 + Minutes () OT Group code    Patient Active Problem List   Diagnosis    DJD (degenerative joint disease), ankle and foot    Hereditary hemochromatosis (H24)    Pulmonary hypertension (H)    Postablative hypothyroidism    Hx of corticosteroid therapy    Class 2 obesity due to excess calories without serious comorbidity in adult    Prediabetes    KYAW (obstructive sleep apnea)    Type 2 diabetes mellitus without complication, without long-term current use of insulin (H)    Hypokalemia    COPD (chronic obstructive pulmonary disease) (H)    Generalized anxiety disorder    Restless leg syndrome    Vitamin B12 deficiency    Vitamin D deficiency    Morbid obesity (H)    Cord compression (H)    History of cervical spinal surgery    Cervical stenosis of spinal canal    Alcohol use disorder, moderate, in sustained remission (H)    Essential hypertension    Psoriatic arthropathy (H)    Primary osteoarthritis involving multiple joints    Gastroesophageal reflux disease with esophagitis without hemorrhage    Numbness in both hands    Chronic, continuous use of opioids     Trauma and stressor-related disorder    Post-menopausal    Suicidal ideation    Depression with anxiety    Severe recurrent major depression without psychotic features (H)

## 2024-06-18 NOTE — PROGRESS NOTES
"  Rehab Group    Start time: 1030  End time: 1215  Patient time total: 90 minutes    attended full group    #5 attended   Group Type: occupational therapy   Group Topic Covered: coping skills     Group Session Detail:  OT clinic     Patient Response/Contribution:  cooperative with task, organized, attentive, and actively engaged     Patient Detail:    Pt actively participated in occupational therapy clinic to facilitate coping skill exploration, creative expression within personally meaningful activities, and clinical observation of social, cognitive, and kinesthetic performance skills. Pt response: Independent to initiate, gather materials, sequence, and adjust to workspace demands as needed. Demonstrated good focus, planning, and attention to detail for selected creative expression task. Organized in her task approach. Observed working at a slow, careful pace, and described herself as \"a perfectionist.\" Able to ask for assistance as needed, and socialized with peers and staff. Calm, pleasant, and engaged.        Train & Education Service Per Session 45 + Minutes () OT Group code    Patient Active Problem List   Diagnosis    DJD (degenerative joint disease), ankle and foot    Hereditary hemochromatosis (H24)    Pulmonary hypertension (H)    Postablative hypothyroidism    Hx of corticosteroid therapy    Class 2 obesity due to excess calories without serious comorbidity in adult    Prediabetes    KYAW (obstructive sleep apnea)    Type 2 diabetes mellitus without complication, without long-term current use of insulin (H)    Hypokalemia    COPD (chronic obstructive pulmonary disease) (H)    Generalized anxiety disorder    Restless leg syndrome    Vitamin B12 deficiency    Vitamin D deficiency    Morbid obesity (H)    Cord compression (H)    History of cervical spinal surgery    Cervical stenosis of spinal canal    Alcohol use disorder, moderate, in sustained remission (H)    Essential hypertension    Psoriatic " arthropathy (H)    Primary osteoarthritis involving multiple joints    Gastroesophageal reflux disease with esophagitis without hemorrhage    Numbness in both hands    Chronic, continuous use of opioids    Trauma and stressor-related disorder    Post-menopausal    Suicidal ideation    Depression with anxiety    Severe recurrent major depression without psychotic features (H)

## 2024-06-18 NOTE — PLAN OF CARE
Problem: Adult Inpatient Plan of Care  Goal: Plan of Care Review  Description: The Plan of Care Review/Shift note should be completed every shift.  The Outcome Evaluation is a brief statement about your assessment that the patient is improving, declining, or no change.  This information will be displayed automatically on your shift  note.  Outcome: Progressing     Problem: Depression  Goal: Improved Mood  Outcome: Progressing   Goal Outcome Evaluation:    Plan of Care Reviewed With: patient       Pt is visible in the milieu, alert and able to make her needs known, paces the hallway. Pt C/O neck, shoulder and back pain rated 7/10, scheduled oxycodone given and pt verbalized it was effective, pt is social with peers and staff, watches television and plays games with peers. Pt endorsed depression moderate, denied SI, SIB, HI, denied A/V/Hallucinations and contracted for safety in the unit. Pt attended groups, adequate food and fluid intake, VSWDL, compliant with medication, no behavior issues or safety concerns

## 2024-06-18 NOTE — PROGRESS NOTES
"  ----------------------------------------------------------------------------------------------------------  [facility]  Psychiatry Progress Note  Hospital Day #28     Interim History:     The patient's care was discussed with the treatment team and chart notes were reviewed.  Sleep: 5 hours (06/18/24 0600)  Staff Report:  Good, attending groups, teary yesterday.  Medications: Took as prescribed.  PRN: No psychiatric PRNs.    Last 24H PRN:     melatonin tablet 3 mg, 3 mg at 06/17/24 2304    oxyCODONE (ROXICODONE) tablet 5 mg, 5 mg at 06/18/24 0700 **AND** [COMPLETED] oxyCODONE (ROXICODONE) tablet 5 mg, 5 mg at 05/21/24 2320 **AND** oxyCODONE (ROXICODONE) tablet 5 mg, 5 mg at 06/18/24 0117       Subjective:     Patient interview:   Winnie was interviewed in the conference room. Said she was \"doing ok, tired of the rain and humidity\". She said she had a bad day yesterday. Went down the rabbit hole, she was in tears a lot yesterday. Hadn't used her tens unit in a while so she was in pain. Discussed her frustration with ECT yesterday. Yesterday she went to listen to music to help her relax and was frustrated when the headphones were dead. She sat down at the telephone yesterday that no one was using and one of the patients came and told her to move while she was in tears yesterday.   She asked ECT about ketamine to clarify more information and does not understand why ketamine cannot be used with opioids.   Her  visited yesterday and she says his visits really help. Describes them as best friends. Says the only thing she is looking forward to with her colonoscopy is the physician. Expressed some hesitation about discharging Friday after ECT. She says she knows her  and cat want her home. Discussed her loneliness. Talked about the 55+ aftercare being booked out a few weeks and doing an outpatient program in the mean time.   Needs another counselor and therapist " "because hers left. She would like to have the option to do in person or zoom.   Discussed her views of some of the patients she has met in here being her support outside of the unit.    ROS:  Patient has pain in her back and neck  Patient denies other acute concerns     Objective:     Vitals:  /74 (BP Location: Right arm, Patient Position: Sitting, Cuff Size: Adult Regular)   Pulse 90   Temp 98.4  F (36.9  C) (Oral)   Resp 16   Ht 1.676 m (5' 6\")   Wt 87.7 kg (193 lb 6.4 oz)   LMP  (LMP Unknown)   SpO2 95%   BMI 31.22 kg/m      Allergies:  Allergies   Allergen Reactions    Serotonin Reuptake Inhibitors (Ssris) Anxiety, Difficulty breathing, Headache, Palpitations and Shortness Of Breath    Buspirone      The patient states she had serotonin syndrome    Cephalexin      Other reaction(s): unknown rxn.    Desvenlafaxine      Serotonin syndrome    Diclofenac Sodium [Diclofenac]      Serotonin syndrome and restless legs syndrome    Gabapentin      Drove on the wrong side of the highway    Levofloxacin      \"CAN'T REMEMBER\"    Penicillins      \"SORES IN MOUTH\"    Riluzole Difficulty breathing and Swelling    Sulfa Antibiotics      \"PT DOES NOT KNOW WHAT THE REACTION WAS\"    Topiramate Other (See Comments)     Frequent urination       Current Medications:  Scheduled:  Current Facility-Administered Medications   Medication Dose Route Frequency Provider Last Rate Last Admin    acetaminophen (TYLENOL) tablet 650 mg  650 mg Oral Q4H PRN Wilner Parker MD   650 mg at 06/17/24 0839    albuterol (PROVENTIL HFA/VENTOLIN HFA) inhaler  2 puff Inhalation Q6H PRN Wilner Parker MD   2 puff at 05/31/24 1741    allopurinol (ZYLOPRIM) tablet 300 mg  300 mg Oral QPM Nallely Barber MD   300 mg at 06/17/24 2013    alum & mag hydroxide-simethicone (MAALOX) suspension 30 mL  30 mL Oral Q4H PRN Wilner Parker MD        ascorbic acid tablet 1,000 mg  1,000 mg Oral Daily Wilner Parker MD   1,000 mg at 06/18/24 " 0842    benzocaine-menthol (CHLORASEPTIC) 6-10 MG lozenge 1 lozenge  1 lozenge Buccal Q1H PRN Erin Ferrera MD        benzonatate (TESSALON) capsule 200 mg  200 mg Oral TID PRN Wilner Parker MD   200 mg at 06/03/24 2153    cholecalciferol (VITAMIN D3) capsule 250 mcg  250 mcg Oral Weekly Wilner Parker MD   250 mcg at 06/12/24 0916    cyanocobalamin (VITAMIN B-12) sublingual tablet 500 mcg  500 mcg Sublingual Daily Wilner Parker MD   500 mcg at 06/18/24 0842    ethacrynic acid (EDECRIN) half-tab 12.5 mg  12.5 mg Oral Daily Wilner Parker MD   12.5 mg at 06/18/24 0841    fluticasone-vilanterol (BREO ELLIPTA) 100-25 MCG/ACT inhaler 1 puff  1 puff Inhalation Daily Wilner Parker MD   1 puff at 06/18/24 0818    And    umeclidinium (INCRUSE ELLIPTA) 62.5 MCG/ACT inhaler 1 puff  1 puff Inhalation Daily Wilner Parker MD   1 puff at 06/18/24 0818    gabapentin (NEURONTIN) capsule 100 mg  100 mg Oral Q6H PRN Wilner Parker MD   100 mg at 06/16/24 1735    gabapentin (NEURONTIN) capsule 400 mg  400 mg Oral At Bedtime Erin Ferrera MD   400 mg at 06/17/24 2304    guaiFENesin (MUCINEX) 12 hr tablet 1,200 mg  1,200 mg Oral BID PRN Wilner Parker MD        guaiFENesin (MUCINEX) 12 hr tablet 600 mg  600 mg Oral BID Jamila Jason MD   600 mg at 06/18/24 0842    HOLD MEDICATION   Does not apply HOLD Alvina Garg DO        Hold Medications for ECT (select and list medications to be held)   Does not apply HOLD Tejinder Correa MD        Hold Medications for ECT (select and list medications to be held)   Does not apply HOLD Boo Riley MD        ibuprofen (ADVIL/MOTRIN) tablet 400 mg  400 mg Oral Q6H PRN Alvina Garg DO   400 mg at 06/11/24 1417    Lidocaine (LIDOCARE) 4 % Patch 1 patch  1 patch Transdermal Q24H Lulu Zacarias MD   1 patch at 06/08/24 1114    magnesium oxide (MAG-OX) tablet 400 mg  400 mg Oral BID Anna Brewer PA-C   400 mg at 06/18/24 0842     melatonin tablet 3 mg  3 mg Oral At Bedtime PRN Wilner Parker MD   3 mg at 06/17/24 2304    menthol (Topical Analgesic) 2.5% (BENGAY VANISHING SCENT) 2.5 % topical gel   Topical Q6H PRN Anna Brewer PA-C   Given at 05/31/24 1744    naloxone (NARCAN) injection 0.2 mg  0.2 mg Intravenous Q2 Min PRN Wilner Parker MD        Or    naloxone (NARCAN) injection 0.4 mg  0.4 mg Intravenous Q2 Min PRN Wilner Parker MD        Or    naloxone (NARCAN) injection 0.2 mg  0.2 mg Intramuscular Q2 Min PRN Wilner Parker MD        Or    naloxone (NARCAN) injection 0.4 mg  0.4 mg Intramuscular Q2 Min PRN Wilner Parker MD        OLANZapine (zyPREXA) tablet 2.5 mg  2.5 mg Oral TID PRN Wilner Parker MD        Or    OLANZapine (zyPREXA) injection 2.5 mg  2.5 mg Intramuscular TID PRN Wilner Parker MD        ondansetron (ZOFRAN ODT) ODT tab 4 mg  4 mg Oral Q6H PRN Wilner Parker MD        oxyCODONE (ROXICODONE) tablet 5 mg  5 mg Oral Once PRN Faustino Beckett MD        oxyCODONE (ROXICODONE) tablet 5 mg  5 mg Oral Q6H Jairo Choudhary MD   5 mg at 06/18/24 0700    And    oxyCODONE (ROXICODONE) tablet 5 mg  5 mg Oral Daily PRN Jairo Choudhary MD   5 mg at 06/18/24 0117    pantoprazole (PROTONIX) EC tablet 40 mg  40 mg Oral Daily Wilner Parker MD   40 mg at 06/18/24 0842    polyethylene glycol (MIRALAX) Packet 17 g  17 g Oral Daily PRN Wilner Parker MD   17 g at 06/16/24 2041    potassium chloride jeannette ER (KLOR-CON M20) CR tablet 20 mEq  20 mEq Oral Daily Wilner Parker MD   20 mEq at 06/18/24 0842    rOPINIRole (REQUIP) tablet 2 mg  2 mg Oral TID Nallely Barber MD   2 mg at 06/17/24 2303    sodium chloride (OCEAN) 0.65 % nasal spray 1 spray  1 spray Both Nostrils Q1H PRN Anna Brewer PA-C        SYNTHROID (Brand only - 75 mcg strength tablets)  150 mcg Oral Once per day on Monday Tuesday Wednesday Thursday Friday Saturday Tejinder Correa MD   150 mcg at 06/18/24 0700    And     SYNTHROID tablet 75 mcg (brand only)  75 mcg Oral Every Sunday Tejinder Correa MD   75 mcg at 06/16/24 0618    zolpidem (AMBIEN) tablet 5 mg  5 mg Oral At Bedtime PRN Wilner Parker MD   5 mg at 05/24/24 0056       PRN:  Current Facility-Administered Medications   Medication Dose Route Frequency Provider Last Rate Last Admin    acetaminophen (TYLENOL) tablet 650 mg  650 mg Oral Q4H PRN Wilner Parker MD   650 mg at 06/17/24 0839    albuterol (PROVENTIL HFA/VENTOLIN HFA) inhaler  2 puff Inhalation Q6H PRN Wilner Parker MD   2 puff at 05/31/24 1741    allopurinol (ZYLOPRIM) tablet 300 mg  300 mg Oral QPM Nallely Barber MD   300 mg at 06/17/24 2013    alum & mag hydroxide-simethicone (MAALOX) suspension 30 mL  30 mL Oral Q4H PRN Wilner Parker MD        ascorbic acid tablet 1,000 mg  1,000 mg Oral Daily Wilner Parker MD   1,000 mg at 06/18/24 0842    benzocaine-menthol (CHLORASEPTIC) 6-10 MG lozenge 1 lozenge  1 lozenge Buccal Q1H PRN Erin Ferrera MD        benzonatate (TESSALON) capsule 200 mg  200 mg Oral TID PRN Wilner Parker MD   200 mg at 06/03/24 2153    cholecalciferol (VITAMIN D3) capsule 250 mcg  250 mcg Oral Weekly Wilner Parker MD   250 mcg at 06/12/24 0916    cyanocobalamin (VITAMIN B-12) sublingual tablet 500 mcg  500 mcg Sublingual Daily Wilner Parker MD   500 mcg at 06/18/24 0842    ethacrynic acid (EDECRIN) half-tab 12.5 mg  12.5 mg Oral Daily Wilner Parker MD   12.5 mg at 06/18/24 0841    fluticasone-vilanterol (BREO ELLIPTA) 100-25 MCG/ACT inhaler 1 puff  1 puff Inhalation Daily Wilner Parker MD   1 puff at 06/18/24 0818    And    umeclidinium (INCRUSE ELLIPTA) 62.5 MCG/ACT inhaler 1 puff  1 puff Inhalation Daily Wilner Parker MD   1 puff at 06/18/24 0818    gabapentin (NEURONTIN) capsule 100 mg  100 mg Oral Q6H PRN Wilner Parker MD   100 mg at 06/16/24 1735    gabapentin (NEURONTIN) capsule 400 mg  400 mg Oral At Bedtime Erin Ferrera MD    400 mg at 06/17/24 2304    guaiFENesin (MUCINEX) 12 hr tablet 1,200 mg  1,200 mg Oral BID PRN Wilner Parker MD        guaiFENesin (MUCINEX) 12 hr tablet 600 mg  600 mg Oral BID Jamila Jason MD   600 mg at 06/18/24 0842    HOLD MEDICATION   Does not apply HOLD Alvina Garg DO        Hold Medications for ECT (select and list medications to be held)   Does not apply HOLD Tejinder Correa MD        Hold Medications for ECT (select and list medications to be held)   Does not apply HOLD Boo Riley MD        ibuprofen (ADVIL/MOTRIN) tablet 400 mg  400 mg Oral Q6H PRN Alvina Garg DO   400 mg at 06/11/24 1417    Lidocaine (LIDOCARE) 4 % Patch 1 patch  1 patch Transdermal Q24H Lulu Zacarias MD   1 patch at 06/08/24 1114    magnesium oxide (MAG-OX) tablet 400 mg  400 mg Oral BID Anna Brewer PA-C   400 mg at 06/18/24 0842    melatonin tablet 3 mg  3 mg Oral At Bedtime PRN Wilner Parker MD   3 mg at 06/17/24 2304    menthol (Topical Analgesic) 2.5% (BENGAY VANISHING SCENT) 2.5 % topical gel   Topical Q6H PRN Anna Brewer PA-C   Given at 05/31/24 1744    naloxone (NARCAN) injection 0.2 mg  0.2 mg Intravenous Q2 Min PRN Wilner Parker MD        Or    naloxone (NARCAN) injection 0.4 mg  0.4 mg Intravenous Q2 Min PRN Wilner Parker MD        Or    naloxone (NARCAN) injection 0.2 mg  0.2 mg Intramuscular Q2 Min PRN Wilner Parker MD        Or    naloxone (NARCAN) injection 0.4 mg  0.4 mg Intramuscular Q2 Min PRN Wilner Parker MD        OLANZapine (zyPREXA) tablet 2.5 mg  2.5 mg Oral TID PRN Wilner Parker MD        Or    OLANZapine (zyPREXA) injection 2.5 mg  2.5 mg Intramuscular TID PRN Wilner Parker MD        ondansetron (ZOFRAN ODT) ODT tab 4 mg  4 mg Oral Q6H PRN Wilner Parker MD        oxyCODONE (ROXICODONE) tablet 5 mg  5 mg Oral Once PRN Faustino Beckett MD        oxyCODONE (ROXICODONE) tablet 5 mg  5 mg Oral Q6H Jairo Choudhary MD   5 mg at  06/18/24 0700    And    oxyCODONE (ROXICODONE) tablet 5 mg  5 mg Oral Daily PRN Jairo Choudhary MD   5 mg at 06/18/24 0117    pantoprazole (PROTONIX) EC tablet 40 mg  40 mg Oral Daily Wilner Parker MD   40 mg at 06/18/24 0842    polyethylene glycol (MIRALAX) Packet 17 g  17 g Oral Daily PRN Wilner Parker MD   17 g at 06/16/24 2041    potassium chloride jeannette ER (KLOR-CON M20) CR tablet 20 mEq  20 mEq Oral Daily Wilner Parker MD   20 mEq at 06/18/24 0842    rOPINIRole (REQUIP) tablet 2 mg  2 mg Oral TID Nallely Barber MD   2 mg at 06/17/24 2303    sodium chloride (OCEAN) 0.65 % nasal spray 1 spray  1 spray Both Nostrils Q1H PRN Anna Brewer PA-C        SYNTHROID (Brand only - 75 mcg strength tablets)  150 mcg Oral Once per day on Monday Tuesday Wednesday Thursday Friday Saturday Tejinder Correa MD   150 mcg at 06/18/24 0700    And    SYNTHROID tablet 75 mcg (brand only)  75 mcg Oral Every Sunday Tejinder Correa MD   75 mcg at 06/16/24 0618    zolpidem (AMBIEN) tablet 5 mg  5 mg Oral At Bedtime PRN Wilner Parker MD   5 mg at 05/24/24 0056       Labs and Imaging:  New results:   No results found for this or any previous visit (from the past 24 hour(s)).    Data this admission:  - CBC elevated Hgb 17.7  - CMP unremarkable  - TSH normal  - UDS THC positive  - Hgb A1c normal  - Lipids LDL mildly elevated 103 but otherwise unremarkable  - Vitamin B12 unremarkable  - Folate unremarkable  - Vitamin D unremarkable  - Urinalysis unremarkable  - EKG normal sinus rhythm, RBBB QTc 458    Potential Drug Interactions:  Per [employer], combinations of the following drugs has a rating of D, demonstrating that the medications may interact with each other in a clinically significant manner and a patient-specific assessment was made and determined that the benefits outweighed the risks.   - Gabapentin, oxycodone, and magnesium oxide The following risks include CNS depression.   - Gabapentin and levothyroxine.  "The following risks include magnesium salts decreasing serum concentration of levothyroxine.     Per [employer], combinations of the following drugs has a rating of C demonstrating that agents may interact in a clinically significant manner but the benefits of the combination of medications often outweigh the risk.   - Ropinirole, oxycodone, and gabapentin. The following risks include CNS depression.   - allopurinol and ethacrynic acid. The following risks include ethacrynic acid increasing concentration of allopurinol.  - ethacrynic acid and oxycodone. The following risks include oxycodone may enhanced the toxic effects of ethacrynic acid  Treatment team will monitor for potential side effects and re-evaluate as needed.           Mental Status Exam:     Oriented to:  Grossly Oriented  General:  Awake and Alert  Appearance:  appears stated age and Grooming is adequate  Behavior/Attitude:  cooperative and open conversational  Eye Contact:  appropriate  Psychomotor: normal and no evidence of tics, dystonia, or tardive dyskinesia no catatonia present  Speech:  appropriate volume/tone and talkative  Language: Fluent in English with appropriate syntax and vocabulary.  Mood:  \"good today\"  Affect:  congruent with mood, variable, sometimes tearful, smiling  Thought Process:  coherent and goal directed, circumstantial  Thought Content:  No HI, No VH, and No AH, no SI; No apparent delusions, sometimes has word finding difficulties  Associations:  intact  Insight:  fair due to recognizing the circumstances affecting her mental health, has a positive attitude towards her treatment  Judgment:  fair due to willingness to engage in ECT  Impulse control: good  Attention Span:  grossly intact  Concentration:  grossly intact  Recent and Remote Memory: grossly intact, though somewhat forgetful  Fund of Knowledge:  average  Muscle Strength and Tone: normal  Gait and Station: Normal     Psychiatric Assessment     Randi Cleary is a " 68 year old female previously diagnosed with MDD, recurrent severe, substance induced mood disorder, Unspecified Trauma, CHAVO, borderline personality disorder and alcohol use disorder who presented voluntarily with SI in the context of treatment resistant MDD. On admission she presented with significant symptoms of depression incuding emotional lability, low mood, hopelessness, amotivation, anxiety, anhedonia, low energy, insomnia low appetite, excess guilt and poor concentration. Her affect was dysphroic and anxious at times with heavy perseveration on medico-surgical dignosises and passive SI causing incresed anxiety. She notes she has had a plan but no intent. Her history of extensive substance use, medical history and chronic pain contribute biologically. Her presentation is further complicated by borderline personality disorder and history of trauma. Additionally, she has a very limited support system. However her last hospitalization was in the 1980's for a suicide attempt via Prozac overdose and has been engaged in treatment and present voluntarily. Her presentation is consistent with decompensated Major Depressive Disorder, recurrent severe. Her disorder has been treatment resistant, including to TMS, and at this time, ECT is be the best course of action to address her symptom severity. Patient warrants inpatient psychiatric hospitalization to maintain her safety. Given her improvement with each ECT treatment thus far and her concerns about the stresses of her home life, it is reasonable to keep her inpatient until she completes the full course of ECT.     Psychiatric Plan by Diagnosis      Today's changes:  - ECT #11 tomorrow     # MDD, recurrent, severe, with anxious distress  - Patient suffers from treatment resistant depression and has trialed many medications. At this point treatment team will need to do a chart review to review medication trials and determine the best medication to address depressive  symptoms long term  - Pt started ECT 5/24 : Hold high-dose gabapentin/pregabalin after 6pm on the day before ECT to permit adequate seizure  - Continue ECT MWF   - Gabapentin 100 mg every 6 hours PRN for anxiety  - 6/13 SLUMS score 26/30 indicating mild cognitive decline     #Insomnia  - Zolpidem 5mg qpm prn  - Melatonin 3 mg at bedtime PRN    Pertinent Labs/Monitoring:   - EKG 5/22 - Qtc 458 NSR, RBBB     Additional Plans:  - Patient will be treated in therapeutic milieu with appropriate individual and group therapies as described     Psychiatric Hospital Course:      Randi Cleary was admitted to [facility] as a voluntary patient for electroconvulsive therapy for lifelong history of depression. Multiple medication trials were not effective. Symptoms notable for low mood, anhedonia, sleep difficulties, guilt and worthlessness, suicidal ideation with plan without intent.   She appears to be improving, though she still has reservations about being ready for discharge. She has stated that she feels ECT is helping, especially with her anger, so she seems to moving in the right direction. It is reassuring that she has been attending group sessions and is enjoying regular visits with her , who is likely who she will be living with once she gets discharged.   She has completed 10 ECT sessions thus far and would like to complete the full treatment of 12 sessions. She has found that ECT has been helpful and her mood and thoughts have improved.   SLUMS score on 6/13 was 26/30, indicating mild cognitive decline. She had some word findings difficulties.    The risks, benefits, alternatives, and side effects were discussed and understood by the patient.     Medical Assessment and Plan     Medical diagnoses to be addressed this admission:    # Hypothyroidism  - PTA 150mcg M-Sa, 75mcg on Perez     # KYAW   - Doesn't use CPAP at home, sleep study on 06/2024      # RLS  - Continue PTA Ropinirole 2 mg PO TID  - Gabapentin  400mg qpm  - Magnesium oxide 400mg BID    #RBBB:  Previously on other EKGs and stable    #Cervical radiculopathy and myelopathy s/p cervical laminoplasty 12/2021    # Chronic Pain  - Continue PTA Roxicodone 5 mg q6h + 5 mg PRN (for max daily dose of 25 mg)  - Menthol 2.5% topical gel q6hrs prn  - TENS Unit     #Dizziness  #Headache  Reports hx of migraines, no vision changes or hearing changes. Notes this has been ongoing since she had her spinal surgery.   - CT head 5/22 unremarkable    #Gout  - Nutrition consult  - Allopurinol 300mg qPM  - Flares in left podagra but none currently    #Vitamin B12 Deficiency  - Vit B12 500mcg daily    #Vitamin C Deficiency  - Ascorbic Acid 1000mg daily    #Vitamin D Deficiency  - 250mcg weekly    #COPD  - Breo Ellipta 1 puff daily   - Incruse Ellipta 1 puff daily  - Tessalon cap 200mg TID prn  - Albuterol 2 puffs q6hr prn  - Mucinex 600 mg BID  - Mucinex 1200mg BID prn     #GERD  #Hiatal Hernia  - Protonix 40mg daily    #Possible pulmonary HTN  #HTN  Per Medicine note, Winnie follows with cardiology here. She had right heart catheterization scheduled for 06/12/2024, though this appointment was cancelled due the present hospitalization. PTA Edecrin 12.5 mg daily and potassium supplement continued. Electrolytes and renal function stable. Recently lost 50lbs and BP has improved.   - Edecrin (Ethacrynic acid) 12.5mg daily   - Potassium chloride 20 mEq (Klor-Con) (potassium supplement)    #Hemochromatosis, hereditary  - Hgb stable at ~17    #Hepatic Steatosis  - Stable  - LFTs wnl, no abdominal pain, distention, N/V    #History of breast cancer s/p mastectomy, chemo and XRT:   - currently in remission      #Alcohol use disorder, in remission:   - No current use.Two years sober     #Hx of pheochromocytoma s/p left adrenalectomy 08/2017:   - Stable  - Follows Endocrinology for this.     #Psoriasis:  - Noted on R hang  - Uses scalp brush    Medical course: Patient was physically examined by  the ED prior to being transferred to the unit and was found to be medically stable and appropriate for admission. Medicine consult was done to provide clearance for ECT. Due to patient's persistent neck pain after surgery in 2021, CT Head was done which was negative. Oxycodone changed from q6hr prn to scheduled with an extra 5mg in the day as needed per PTA regimen. Patient continued to express some nerve pain so gabapentin was increased to 400mg.   Pt had a cardiology apt outpt that she needed to move before discharge.     Consults: Dietician, Medicine for ECT Clearance, ECT Consult, Nutrition     Checklist     Legal Status: Voluntary     Safety Assessment:   Behavioral Orders   Procedures    Code 1 - Restrict to Unit    Code 2 - 1:1 Staff Supervision     For coming down to ECT only    Discontinue 1:1 attendant for suicide risk     Order Specific Question:   I have performed an in person assessment of the patient     Answer:   Based on this assessment the patient no longer requires a one on one attendant at this point in time.     Order Specific Question:   Rationale     Answer:   Patient States able to remain safe in hospital     Order Specific Question:   Rationale     Answer:   Modifications to care environment made to mitigate safety risk     Order Specific Question:   Rationale     Answer:   Routine observations are sufficient to monitor safety.    Electroconvulsive therapy     Series of up to 12 treatments. Begin Date: 5/24/24     Treating Psychiatrist providing ECT:  Ruthann     Notified on:  5/21/24    Electroconvulsive therapy     Series of up to 12 treatments. Begin Date: 5/24/24     Treating Psychiatrist providing ECT:  Ruthann     Notified on:  5/21/24    Fall precautions    Routine Programming     As clinically indicated    Status 15     Every 15 minutes.    Suicide precautions: Suicide Risk: MODERATE; Clinical rationale to override score: lack of access to a plan for self-harm     Order Specific  Question:   Suicide Risk     Answer:   MODERATE     Order Specific Question:   Clinical rationale to override score:     Answer:   lack of access to a plan for self-harm       Risk Assessment:  Risk for harm is low.  Risk factors: SI, maladaptive coping, trauma, and past behaviors  Protective factors: family and engaged in treatment     SIO: Discontinued    Disposition: Pending stabilization, medication optimization, & development of a safe discharge plan. Pt has been accepted to 55 plus program once stable. Likely discharge is at end of ECT course, which is scheduled to be Friday, June 21.     Attestations      Sung Myers, MS3     Resident Attestation:   I was present with the medical student who participated in the service and in the documentation of the note.  I have verified the history and personally performed the physical exam, mental status exam, and medical decision making. I agree with the assessment and plan of care as documented in the note.     Yennifer Martinez MD  Psychiatry Resident Physician    Attestation:  This patient has been seen and evaluated by me, Jairo Choudhary MD.  I have discussed this patient with the house staff team including the resident and medical student and I agree with the findings and plan in this note.    I have reviewed today's vital signs, medications, labs and imaging. Jairo Choudhary MD , PhD.

## 2024-06-18 NOTE — PROGRESS NOTES
Assessment/Intervention/Current Symtoms and Care Coordination:  Chart reviewed and patient met with team,   Discussed patient progress, symptomology, and response to treatment.  Discussed the discharge plan and any potential impediments to discharge.     Patient continues course of ECT without complication.  ECT #10 completed yesterday without complications.  Mood has been improving subjective and objectively. She does express anxiety about going home.  Affect has remains brighter. .  Patient has continued to actively participate in groups, has been social with peers/staff.  Discussed last ECT scheduled for 6/20- plan to discharge following recovery that day     Discharge Plan or Goal:  Patient will return home likely 6/21/24  Psychiatry  Therapy  55 Plus/IOP in interim     Barriers to Discharge:  Initiation of ECT- unable to do ECT as outpatient     Referral Status:  55 PLus IOP -intake completed 6/6  Therapist- referral in process     Legal Status:  Voluntary     Contacts:  Outpatient Psychiatrist: Jeannette Uribe [facility]  Primary Physician: Laith Constantino  Family Members: Justin Cleary (Spouse) [phone]     Upcoming Meetings and Dates/Important Information and next steps:  Team update:  Wed     Will need to coordinate with 55 Plus program re: start date

## 2024-06-18 NOTE — PLAN OF CARE
"Pt is visible in the milieu. She apologized for being tearful yesterday. Pt was reassured of the unit support. She was appreciative . Pt presented with a bright affect. Her nutrition, hydration and hygiene is adequate. She ate breakfast at the dining area with peers and she engaged pleasantly with this select peers. Pt denies SI and Hallucinations. Pt rated her anxiety and depression as fair.  Pt able to participate in OT clinic groups.     PRNs: Acetaminophen             Gabapentin    Problem: Sleep Disturbance  Goal: Adequate Sleep/Rest  Outcome: Not Progressing     Problem: Adult Behavioral Health Plan of Care  Goal: Patient-Specific Goal (Individualization)  Description: You can add care plan individualizations to a care plan. Examples of Individualization might be:  \"Parent requests to be called daily at 9am for status\", \"I have a hard time hearing out of my right ear\", or \"Do not touch me to wake me up as it startles  me\".  Outcome: Progressing  Goal: Adheres to Safety Considerations for Self and Others  Outcome: Progressing  Goal: Absence of New-Onset Illness or Injury  Outcome: Progressing  Goal: Optimized Coping Skills in Response to Life Stressors  Outcome: Progressing  Intervention: Promote Effective Coping Strategies  Recent Flowsheet Documentation  Taken 6/18/2024 1035 by Yoselin Garcia RN  Supportive Measures:   active listening utilized   goal-setting facilitated     Problem: Suicide Risk  Goal: Absence of Self-Harm  Outcome: Progressing  Intervention: Assess Risk to Self and Maintain Safety  Recent Flowsheet Documentation  Taken 6/18/2024 1035 by Yoselin Garcia RN  Self-Harm Prevention: environmental self-harm risks assessed  Intervention: Promote Psychosocial Wellbeing  Recent Flowsheet Documentation  Taken 6/18/2024 1035 by Yoselin Garcia RN  Supportive Measures:   active listening utilized   goal-setting facilitated  Family/Support System Care:   involvement " promoted   self-care encouraged     Problem: Depression  Goal: Improved Mood  Outcome: Progressing  Intervention: Monitor and Manage Depressive Symptoms  Recent Flowsheet Documentation  Taken 6/18/2024 1035 by Yoselin Garcia RN  Supportive Measures:   active listening utilized   goal-setting facilitated  Family/Support System Care:   involvement promoted   self-care encouraged     Problem: Suicidal Behavior  Goal: Suicidal Behavior is Absent or Managed  Outcome: Progressing     Problem: Fall Injury Risk  Goal: Absence of Fall and Fall-Related Injury  Outcome: Progressing     Problem: Psychotic Signs/Symptoms  Goal: Improved Behavioral Control (Psychotic Signs/Symptoms)  Outcome: Progressing   Goal Outcome Evaluation:    Plan of Care Reviewed With: patient

## 2024-06-18 NOTE — PLAN OF CARE
BEH IP Unit Acuity Rating Score (UARS)  Patient is given one point for every criteria they meet.     CRITERIA SCORING   On a 72 hour hold, court hold, committed, stay of commitment, or revocation. 0    Patient LOS on BEH unit exceeds 20 days. 1  LOS: 28   Patient under guardianship, 55+, otherwise medically complex, or under age 11. 1   Suicide ideation without relief of precipitating factors. 0   Current plan for suicide. 0   Current plan for homicide. 0   Imminent risk or actual attempt to seriously harm another without relief of factors precipitating the attempt. 0   Severe dysfunction in daily living (ex: complete neglect for self care, extreme disruption in vegetative function, extreme deterioration in social interactions). 1   Recent (last 7 days) or current physical aggression in the ED or on unit. 0   Restraints or seclusion episode in past 72 hours. 0   Recent (last 7 days) or current verbal aggression, agitation, yelling, etc., while in the ED or unit. 0   Active psychosis. 0   Need for constant or near constant redirection (from leaving, from others, etc).  0   Intrusive or disruptive behaviors. 0   Patient requires 3 or more hours of individualized nursing care per 8-hour shift (i.e. for ADLs, meds, therapeutic interventions). 0   TOTAL 3

## 2024-06-18 NOTE — PLAN OF CARE
Safety checks completed every 15 minutes; no concerns noted. Pain controlled by scheduled pain meds. Pt appears to have slept for 5 hours. Will continue to monitor and offer support,.     Problem: Sleep Disturbance  Goal: Adequate Sleep/Rest  Outcome: Progressing   Goal Outcome Evaluation:

## 2024-06-19 ENCOUNTER — ANESTHESIA (OUTPATIENT)
Dept: BEHAVIORAL HEALTH | Facility: CLINIC | Age: 69
DRG: 881 | End: 2024-06-19
Payer: MEDICARE

## 2024-06-19 ENCOUNTER — ANESTHESIA EVENT (OUTPATIENT)
Dept: BEHAVIORAL HEALTH | Facility: CLINIC | Age: 69
DRG: 881 | End: 2024-06-19
Payer: MEDICARE

## 2024-06-19 ENCOUNTER — APPOINTMENT (OUTPATIENT)
Dept: BEHAVIORAL HEALTH | Facility: CLINIC | Age: 69
DRG: 881 | End: 2024-06-19
Payer: MEDICARE

## 2024-06-19 PROCEDURE — 90870 ELECTROCONVULSIVE THERAPY: CPT | Performed by: PSYCHIATRY & NEUROLOGY

## 2024-06-19 PROCEDURE — 250N000013 HC RX MED GY IP 250 OP 250 PS 637

## 2024-06-19 PROCEDURE — 250N000009 HC RX 250: Performed by: STUDENT IN AN ORGANIZED HEALTH CARE EDUCATION/TRAINING PROGRAM

## 2024-06-19 PROCEDURE — 250N000013 HC RX MED GY IP 250 OP 250 PS 637: Performed by: PSYCHIATRY & NEUROLOGY

## 2024-06-19 PROCEDURE — 124N000002 HC R&B MH UMMC

## 2024-06-19 PROCEDURE — 90832 PSYTX W PT 30 MINUTES: CPT

## 2024-06-19 PROCEDURE — 258N000003 HC RX IP 258 OP 636: Mod: JZ | Performed by: STUDENT IN AN ORGANIZED HEALTH CARE EDUCATION/TRAINING PROGRAM

## 2024-06-19 PROCEDURE — 250N000013 HC RX MED GY IP 250 OP 250 PS 637: Performed by: PHYSICIAN ASSISTANT

## 2024-06-19 PROCEDURE — 250N000011 HC RX IP 250 OP 636: Performed by: STUDENT IN AN ORGANIZED HEALTH CARE EDUCATION/TRAINING PROGRAM

## 2024-06-19 PROCEDURE — 370N000017 HC ANESTHESIA TECHNICAL FEE, PER MIN: Performed by: STUDENT IN AN ORGANIZED HEALTH CARE EDUCATION/TRAINING PROGRAM

## 2024-06-19 PROCEDURE — 90870 ELECTROCONVULSIVE THERAPY: CPT | Performed by: STUDENT IN AN ORGANIZED HEALTH CARE EDUCATION/TRAINING PROGRAM

## 2024-06-19 PROCEDURE — 90870 ELECTROCONVULSIVE THERAPY: CPT

## 2024-06-19 PROCEDURE — 250N000011 HC RX IP 250 OP 636: Mod: JZ | Performed by: PSYCHIATRY & NEUROLOGY

## 2024-06-19 PROCEDURE — 90870 ELECTROCONVULSIVE THERAPY: CPT | Performed by: NURSE ANESTHETIST, CERTIFIED REGISTERED

## 2024-06-19 RX ORDER — KETOROLAC TROMETHAMINE 30 MG/ML
30 INJECTION, SOLUTION INTRAMUSCULAR; INTRAVENOUS ONCE
Status: DISCONTINUED | OUTPATIENT
Start: 2024-06-19 | End: 2024-06-19

## 2024-06-19 RX ORDER — BISACODYL 5 MG/1
TABLET, DELAYED RELEASE ORAL
Qty: 4 TABLET | Refills: 0 | Status: SHIPPED | OUTPATIENT
Start: 2024-06-19 | End: 2024-08-06

## 2024-06-19 RX ORDER — NICARDIPINE HCL-0.9% SOD CHLOR 1 MG/10 ML
SYRINGE (ML) INTRAVENOUS PRN
Status: DISCONTINUED | OUTPATIENT
Start: 2024-06-19 | End: 2024-06-19

## 2024-06-19 RX ORDER — METHOHEXITAL IN WATER/PF 100MG/10ML
SYRINGE (ML) INTRAVENOUS PRN
Status: DISCONTINUED | OUTPATIENT
Start: 2024-06-19 | End: 2024-06-19

## 2024-06-19 RX ORDER — LABETALOL HYDROCHLORIDE 5 MG/ML
INJECTION, SOLUTION INTRAVENOUS PRN
Status: DISCONTINUED | OUTPATIENT
Start: 2024-06-19 | End: 2024-06-19

## 2024-06-19 RX ADMIN — LEVOTHYROXINE SODIUM 150 MCG: 75 TABLET ORAL at 06:41

## 2024-06-19 RX ADMIN — OXYCODONE HYDROCHLORIDE 5 MG: 5 TABLET ORAL at 12:03

## 2024-06-19 RX ADMIN — OXYCODONE HYDROCHLORIDE 5 MG: 5 TABLET ORAL at 23:02

## 2024-06-19 RX ADMIN — GABAPENTIN 100 MG: 100 CAPSULE ORAL at 16:16

## 2024-06-19 RX ADMIN — MAGNESIUM OXIDE TAB 400 MG (241.3 MG ELEMENTAL MG) 400 MG: 400 (241.3 MG) TAB at 09:38

## 2024-06-19 RX ADMIN — ROPINIROLE HYDROCHLORIDE 2 MG: 2 TABLET, FILM COATED ORAL at 23:02

## 2024-06-19 RX ADMIN — GABAPENTIN 400 MG: 400 CAPSULE ORAL at 23:02

## 2024-06-19 RX ADMIN — MAGNESIUM OXIDE TAB 400 MG (241.3 MG ELEMENTAL MG) 400 MG: 400 (241.3 MG) TAB at 20:06

## 2024-06-19 RX ADMIN — OXYCODONE HYDROCHLORIDE 5 MG: 5 TABLET ORAL at 06:41

## 2024-06-19 RX ADMIN — Medication 250 MCG: at 09:38

## 2024-06-19 RX ADMIN — KETOROLAC TROMETHAMINE 30 MG: 30 INJECTION, SOLUTION INTRAMUSCULAR at 08:25

## 2024-06-19 RX ADMIN — LABETALOL HYDROCHLORIDE 20 MG: 5 INJECTION, SOLUTION INTRAVENOUS at 08:32

## 2024-06-19 RX ADMIN — GUAIFENESIN 600 MG: 600 TABLET ORAL at 09:38

## 2024-06-19 RX ADMIN — POTASSIUM CHLORIDE 20 MEQ: 1500 TABLET, EXTENDED RELEASE ORAL at 09:38

## 2024-06-19 RX ADMIN — SUCCINYLCHOLINE CHLORIDE 90 MG: 20 INJECTION, SOLUTION INTRAMUSCULAR; INTRAVENOUS; PARENTERAL at 08:33

## 2024-06-19 RX ADMIN — Medication 12.5 MG: at 09:38

## 2024-06-19 RX ADMIN — POLYETHYLENE GLYCOL 3350 17 G: 17 POWDER, FOR SOLUTION ORAL at 10:39

## 2024-06-19 RX ADMIN — Medication 1000 MG: at 09:38

## 2024-06-19 RX ADMIN — ALLOPURINOL 300 MG: 300 TABLET ORAL at 20:06

## 2024-06-19 RX ADMIN — Medication 500 MCG: at 08:36

## 2024-06-19 RX ADMIN — FLUTICASONE FUROATE AND VILANTEROL TRIFENATATE 1 PUFF: 100; 25 POWDER RESPIRATORY (INHALATION) at 08:03

## 2024-06-19 RX ADMIN — Medication 500 MCG: at 08:33

## 2024-06-19 RX ADMIN — SODIUM CHLORIDE, POTASSIUM CHLORIDE, SODIUM LACTATE AND CALCIUM CHLORIDE 1000 ML: 600; 310; 30; 20 INJECTION, SOLUTION INTRAVENOUS at 09:09

## 2024-06-19 RX ADMIN — PANTOPRAZOLE SODIUM 40 MG: 40 TABLET, DELAYED RELEASE ORAL at 08:03

## 2024-06-19 RX ADMIN — Medication 100 MG: at 08:32

## 2024-06-19 RX ADMIN — GUAIFENESIN 600 MG: 600 TABLET ORAL at 20:06

## 2024-06-19 RX ADMIN — UMECLIDINIUM 1 PUFF: 62.5 AEROSOL, POWDER ORAL at 08:03

## 2024-06-19 RX ADMIN — ACETAMINOPHEN 650 MG: 325 TABLET, FILM COATED ORAL at 16:16

## 2024-06-19 RX ADMIN — OXYCODONE HYDROCHLORIDE 5 MG: 5 TABLET ORAL at 18:57

## 2024-06-19 RX ADMIN — ROPINIROLE HYDROCHLORIDE 2 MG: 2 TABLET, FILM COATED ORAL at 15:50

## 2024-06-19 RX ADMIN — Medication 500 MCG: at 09:38

## 2024-06-19 RX ADMIN — ROPINIROLE HYDROCHLORIDE 2 MG: 2 TABLET, FILM COATED ORAL at 20:06

## 2024-06-19 RX ADMIN — Medication 3 MG: at 23:02

## 2024-06-19 ASSESSMENT — ACTIVITIES OF DAILY LIVING (ADL)
ADLS_ACUITY_SCORE: 41
DRESS: INDEPENDENT
ADLS_ACUITY_SCORE: 41
ADLS_ACUITY_SCORE: 41
DRESS: STREET CLOTHES;INDEPENDENT
ADLS_ACUITY_SCORE: 41
ORAL_HYGIENE: INDEPENDENT;PROMPTS
ADLS_ACUITY_SCORE: 41
ORAL_HYGIENE: INDEPENDENT
ADLS_ACUITY_SCORE: 41
ADLS_ACUITY_SCORE: 41
HYGIENE/GROOMING: HANDWASHING;SHOWER;INDEPENDENT
ADLS_ACUITY_SCORE: 41
ADLS_ACUITY_SCORE: 41
HYGIENE/GROOMING: INDEPENDENT
ADLS_ACUITY_SCORE: 41
LAUNDRY: WITH SUPERVISION
ADLS_ACUITY_SCORE: 41
LAUNDRY: WITH SUPERVISION
ADLS_ACUITY_SCORE: 41

## 2024-06-19 ASSESSMENT — COPD QUESTIONNAIRES: COPD: 1

## 2024-06-19 NOTE — PLAN OF CARE
Pt was visible in the milieu. Pt was out in the dinning area and lounge. Presents with flat affect. Pt endorses anxiety 6/10 and depression 5/10. Denied suicidal ideations and hallucinations. C/o headache and prn Tylenol administered. Pt says she would prefer to discharge on Saturday after getting ECT on Friday. Prn melatonin administered. Vitals signs are stable Vital signs:  Temp: 97.2  F (36.2  C) Temp src: Temporal BP: 130/82 Pulse: 83   Resp: 16 SpO2: 96 % O2 Device: None (Room air) Oxygen Delivery: 3 LPM            Goal Outcome Evaluation:  Problem: Psychotic Signs/Symptoms  Goal: Increased Participation and Engagement (Psychotic Signs/Symptoms)  Intervention: Facilitate Participation and Engagement  Recent Flowsheet Documentation  Taken 6/19/2024 1654 by Saul Perez RN  Diversional Activity:   television   music  Goal: Improved Mood Symptoms (Psychotic Signs/Symptoms)  Intervention: Optimize Emotion and Mood  Recent Flowsheet Documentation  Taken 6/19/2024 1654 by Saul Perez RN  Diversional Activity:   television   music  Goal: Improved Psychomotor Symptoms (Psychotic Signs/Symptoms)  Intervention: Manage Psychomotor Movement  Recent Flowsheet Documentation  Taken 6/19/2024 1654 by Saul Perez RN  Diversional Activity:   television   music

## 2024-06-19 NOTE — PLAN OF CARE
Pt was visible in the milieu and social with peers. Presents with flat affect. Pt was playing cards with a peers in the dinning area and later watched movies in the lounge. Denies for anxiety but endorsed depression 5/10. Denied suicidal ideation and hallucinations. Oxycodone given for neck and back pain. Prn Melatonin administered at bedtime. No other concerns.    Goal Outcome Evaluation:  Problem: Psychotic Signs/Symptoms  Goal: Increased Participation and Engagement (Psychotic Signs/Symptoms)  Intervention: Facilitate Participation and Engagement  Recent Flowsheet Documentation  Taken 6/18/2024 1823 by Saul Perez RN  Diversional Activity: play  Goal: Improved Mood Symptoms (Psychotic Signs/Symptoms)  Intervention: Optimize Emotion and Mood  Recent Flowsheet Documentation  Taken 6/18/2024 1823 by Saul Perez RN  Diversional Activity: play  Goal: Improved Psychomotor Symptoms (Psychotic Signs/Symptoms)  Intervention: Manage Psychomotor Movement  Recent Flowsheet Documentation  Taken 6/18/2024 1823 by Saul Perez RN  Diversional Activity: play

## 2024-06-19 NOTE — PLAN OF CARE
"Goal Outcome Evaluation:    Plan of Care Reviewed With: patient Plan of Care Reviewed With: patient    Overall Patient Progress: improvingOverall Patient Progress: improving         Problem: Adult Inpatient Plan of Care  Goal: Plan of Care Review  Description: The Plan of Care Review/Shift note should be completed every shift.  The Outcome Evaluation is a brief statement about your assessment that the patient is improving, declining, or no change.  This information will be displayed automatically on your shift  note.  Outcome: Progressing  Flowsheets (Taken 6/19/2024 1157)  Plan of Care Reviewed With: patient  Overall Patient Progress: improving     Pt had an ECT today and it went well. Per report, pt had a low BP after ECT and had to get a liter of IV fluids. BP improved after and when pt got in the unit, had a stable VS. Pt was A & O x 4, denies dizziness and light headed. Pt ate late breakfast and took her AM medications. Pt remained social with select peers. Took a nap after lunch. Pt endorsed generalized chronic pain 7/10 scheduled pain medications was administered with minimal effects. Endorsed \"minimal\" anxiety and depression. Denied SI/HI/SIB and hallucinations. Pt was contracted for safety. Hygiene, nutrition and hydration is adequate. Blood pressure 112/65, pulse 73, temperature 96.9  F (36.1  C), temperature source Temporal, resp. rate 20, height 1.676 m (5' 6\"), weight 87.7 kg (193 lb 6.4 oz), SpO2 95%, not currently breastfeeding.    "

## 2024-06-19 NOTE — PROCEDURES
"Procedures  [facility], [place]   ECT Procedure Note   06/19/2024    Randi Cleary is a 68 year old female patient.  [id number]    Patient Status: IN-patient    Is this the first in a series of 12 treatments?  No      Allergies   Allergen Reactions    Serotonin Reuptake Inhibitors (Ssris) Anxiety, Difficulty breathing, Headache, Palpitations and Shortness Of Breath    Buspirone      The patient states she had serotonin syndrome    Cephalexin      Other reaction(s): unknown rxn.    Desvenlafaxine      Serotonin syndrome    Diclofenac Sodium [Diclofenac]      Serotonin syndrome and restless legs syndrome    Gabapentin      Drove on the wrong side of the highway    Levofloxacin      \"CAN'T REMEMBER\"    Penicillins      \"SORES IN MOUTH\"    Riluzole Difficulty breathing and Swelling    Sulfa Antibiotics      \"PT DOES NOT KNOW WHAT THE REACTION WAS\"    Topiramate Other (See Comments)     Frequent urination       Weight:  193 lbs 6.4 oz / 87 kg          Indications for ECT:   Medications ineffective and Psychotherapies ineffective         Clinical Narrative:   HPI - The patient describes a lifelong history of depression dating back to elementary school, worsening after her father's death when she was 13yo and especially in the 1980s in the setting of worsening physical health (since falling and breaking her ankle), which led to the the initiation of fluoxetine, her first antidepressant.  Her history has been primarily characterized by low mood, with some ups and downs but no extended depression-free periods since then.  She has tried many different medications from different classes, but cannot recall any one being particularly helpful for her.  She has experienced past episodes of particularly irritable mood and concomitant decreased sleep need, although most of these have lasted 1-2 days and triggered by anger related to external stressors.  She has at least one episode of a more " "extended episode of elevated mood (and associated grandiosity, increased spending, flight of ideas, increased goal directed behaviors, pressured speech, and odd and embarrassing behavior), which occurred in the context of relapse on alcohol in 2021. She does not endorse any events before about age 48.     The patient's depression is characterized by near daily low mood, frequent anhedonia, with sleep difficulties (although c/b pain, KYAW, and RLS), fatigue, feelings of guilt/worthlessness, and impaired concentration.  She reports feelings of \"despair,\" at times with active SI with plan, but denies any intent to act on this - \"maybe it's hope.\"  She has 1 lifetime suicide attempt (overdose) in the 1980s, for which she was psychiatrically hospitalized.  She has a remote history of SIB (cutting) but not recently.       Psych pertinent item history includes includes suicide attempt , suicidal ideation, SIB , aggression, trauma hx, substance use: alcohol, cannabis, and Patient has a history of alcohol dependence treated 20+ years ago and she relapsed in 2020 for a couple of months, in her 20s she\" loved getting high\" on cocaine, LSD, mushrooms, speed, white cross, mutiple psychotropic trials , psych hosp, ketamine, and major medical problems.    Target Symptoms for Improvement: Amotivation, Fatigue, Improved self-image and Panic attacks         Diagnosis:   Major depression         Assessment:   #1 05/24/24 Some circumstantial thought, needs some redirection, 3.5 hours sleep last night, anxious, mood 1/10, PDW but no SI, never had ECT before - only TMS. No AVH.   #2 05/29/24  Mood 4/10, better over w/e, some word find difficulties, no AVH/SI/PDW. Chronic sciatica pain, neck and shoulder pain - didn't worsen with ECT. Mild headache.   #3 05/31/24  Mood 4/10, No SI, PDW, AVH, some wrist pain and headache, memory might be improving.    #4 6/3/24  Phq-9= 13, mood 3/10.  Yesterday was very tearful, still a bit today.  Last " "treatment had awareness under paralysis.  Otherwise, some initial confusion after first ECT but no subsequent cognitive effects.  Signed consent to continue.  #5 6/5/24 Mood 4-5/10  She feels like her \"rage\" is less and she feels lighter. but no cognitive side effects. She complains of excessive sweating and is concerned her thryoid is unbalanced. She is somatically focused She reports pain on the side of her neck radiating down to her chest. She had a migraine she thinks over the weekend which usual starts as occipital tension.  #6 6/7/24 mood 4-5/10. No SI. She does have some baseline long term memory difficulties no AVH.   #7 6/10/24  She still is worried that She is not able to go home. She had visitors this weekend who said \"you don't seem to have the weight of the world on your shoulders anymore\" she disagrees she had a downward spiral over the weekend when there was a mix up with her clothes and she usually feels tearful after ECT. She does not report anything feeling worse. She gets down on herself when she has an anxiety spiral and it was the 1st one she has had since admission.   #8 6/12/24 Winnie reported some tearfulness overnight. She is noticing a weight lifting mood 6/10 . Reports some difficulty with remembering names but believes this is at baseline.   #9 6/14/24 Mood 5/10 (10 best) She feels like she is improving each time . She still has occasional crying spells but she feels she bounces back quicker. She is still anxious about going home. No Si. Tolerating ECT  #10 06/17/24 mood 5/10 She does feellike she has gotten some improvement since starting Ect but still has times where she gets tearful and anxious \"goes down the rabit hole\" but not as often . She has had ketamine troches before with pain  management to no effect but wonders about ketamine infusions.  #11 06/19/24 Mood today is 5/10. Patient is wondering whether she could do a ketamine course (did have ketamine in the past), despite of being " "on opioids. Feels that ketamine can help with \"anger, tearing and anxiety.\" She complains to the anesthesiologist about recent urinary incontinence which needs to be considered when  using ketamine. She wants to try it for anger ,tearing and anxiety which is not a standard indication       Pause for the Cause:     Correct patient Yes   Correct procedure/laterality settings: Yes           Intra-Procedure Documentation:     ECT #: 11   Treatment number this series: 11   Total treatment number: 11     Type of ECT:  Right, unilateral ultrabrief    ECT Medications:    Toradol 30mg iv - for headache/myalgia     Brevital: 100 mg (incr from 90 mg as still awake)   Succinyl Choline: 90 mg    BP - nicardipine 1500 mcg         ECT Strip Summary: RUL Titration #3: 38.4 mC   Energy Level:  345.6mC, 0.3 ms, 90 Hz, 8 sec, 800 mA     Motor Seizure Duration: 17 seconds  EEG Seizure Duration: 29 seconds     Complications: none  Plan:   - Continue RUL ECT - Q MWF    - Monitor depression severity with clinical assessment augmented with PHQ9 every other treatment  - Continue current medications    Discharge instructions:   - Continue acute ECT    - Per Dr Varela:   - Consider trial of serotonin modulator (e.g., vilazodone vs. vortioxetine) or novel agent Auvelity  - Consider augmentation with atypical antipsychotic (e.g., quetiapine or aripiprazole), though there are concerns given pre-diabetes      Toan Cotter MD  Dept of Psychiatry  "

## 2024-06-19 NOTE — PROGRESS NOTES
Pt has met discharge criteria. Received IVF after some hypotension and Vital signs stable. PIV removed without issue. Pt moved to discharge area via wheelchair and transported to [facility] via unit staff. Report given to xiang, with emphasis on monitoring BP   Patient is a 63 female with history of HTN, HLD, ischemic CVA in 2004, who presented to the ED 4/11/19 from home for evaluation of AMS. 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   Respiratory Failure s/p trach  Dysphagia s/p PEG  + sputum culture - Enterobacter / Burkholderia, on ABx    Recommendations:   Continue PEG feeds  Monitor BMs  Add Probiotic BID (PEG)  PPI  monitor and replete electrolytes prn    Discussed with RCU team    Amari Unger PA-C    [facility]  [phone]

## 2024-06-19 NOTE — ANESTHESIA POSTPROCEDURE EVALUATION
Patient: Randi Cleary    Procedure: * No procedures listed *       Anesthesia Type:  General    Note:  Disposition: Inpatient   Postop Pain Control: Uneventful            Sign Out: Well controlled pain   PONV: No   Neuro/Psych: Uneventful            Sign Out: Acceptable/Baseline neuro status   Airway/Respiratory: Uneventful            Sign Out: Acceptable/Baseline resp. status   CV/Hemodynamics: Uneventful            Sign Out: Acceptable CV status; No obvious hypovolemia; No obvious fluid overload   Other NRE: NONE   DID A NON-ROUTINE EVENT OCCUR? No           Last vitals:  Vitals:    06/18/24 0834 06/18/24 1600 06/19/24 0753   BP: 127/74 111/69    Pulse: 90 85    Resp:   16   Temp: 36.9  C (98.4  F) 36.3  C (97.4  F) 36  C (96.8  F)   SpO2: 95% 95% 95%       Electronically Signed By: April Luciano MD  June 19, 2024  8:47 AM

## 2024-06-19 NOTE — PLAN OF CARE
Care Coordinator scheduled the following appointment:     Individual Therapy appointment: Tuesday, July 2, 2024 @ 9am In-person   Provider: Nickie Garnett [facility]  Location: [address] [facility] [address] , [place] [address]  Phone: [phone]  Fax: [phone]  Note: Intake paperwork to be completed at least 48 hours prior to your appointment will be sent via email ([email or web address]).     CC updated CTC and AVS

## 2024-06-19 NOTE — PLAN OF CARE
No PRNs given or requested this shift. Pt remained in her room the entire shift. Safety checks completed every 15 minutes; no concerns noted. Pain controled with scheduled pain meds; helpful. Pt appears to have slept for 5.50  hours; will continue to monitor and offer support.     Problem: Sleep Disturbance  Goal: Adequate Sleep/Rest  Outcome: Progressing   Goal Outcome Evaluation:

## 2024-06-19 NOTE — ANESTHESIA CARE TRANSFER NOTE
Patient: Randi Cleary    Procedure: * No procedures listed *       Diagnosis: * No pre-op diagnosis entered *  Diagnosis Additional Information: No value filed.    Anesthesia Type:   General     Note:    Oropharynx: oropharynx clear of all foreign objects and spontaneously breathing  Level of Consciousness: drowsy  Oxygen Supplementation: room air    Independent Airway: airway patency satisfactory and stable  Dentition: dentition unchanged  Vital Signs Stable: post-procedure vital signs reviewed and stable  Report to RN Given: handoff report given  Patient transferred to: PACU    Handoff Report: Identifed the Patient, Identified the Reponsible Provider, Reviewed the pertinent medical history, Discussed the surgical course, Reviewed Intra-OP anesthesia mangement and issues during anesthesia, Set expectations for post-procedure period and Allowed opportunity for questions and acknowledgement of understanding      Vitals:  Vitals Value Taken Time   /78    Temp     Pulse 65    Resp 15    SpO2 99        Electronically Signed By: DION Farr CRNA  June 19, 2024  8:39 AM

## 2024-06-19 NOTE — PROVIDER NOTIFICATION
06/19/24 0956   Individualization/Patient Specific Goals   Patient Personal Strengths appropriate judgment/decision-making;expressive of emotions;expressive of needs;independent living skills;intellectual cognitive skills;interests/hobbies;motivated for recovery;medication/treatment adherence;positive vocational history;resilient;resourceful;socioeconomic stability;stable living environment   Patient Vulnerabilities family/relationship conflict;history of unsuccessful treatment;poor impulse control;traumatic event   Anxieties, Fears or Concerns ongoing CHAVO   Individualized Care Needs Chronic pain   Interprofessional Rounds   Summary 1. Stabilization of mood disorder symptoms 2. Safe with self 3. Medication mgmt per MD's 4. Medically cleared for ECT 5. Coordination of care with outpatient providers 6. Psych f/u care in place   Participants CTC;patient;psychiatrist;nursing   Behavioral Team Discussion   Participants Dr. Choudhary, Dr. Martinez,Winsome Solano RN, Lorena Victoria MA.LP,   Progress Patient has undergone ECT x11 thus far. Mood continues to improve. Patient has been social /engaging on the unit.   Anticipated length of stay 2-3 days   Continued Stay Criteria/Rationale Completion of ECT   Medical/Physical Chronic pain, S/P Breast cancer   Precautions Per unit protocol   Plan ECT in progress - last one scheduled for 6/21..  Patient will return home after ECT .  Patient has Psychiatry/therapy and [address] in place   Rationale for change in precautions or plan No change in plan/precautions   Safety Plan Patient to complete   Anticipated Discharge Disposition home with family     Goal Outcome Evaluation:

## 2024-06-19 NOTE — ANESTHESIA PREPROCEDURE EVALUATION
Anesthesia Pre-Procedure Evaluation    Patient: Randi Cleary   MRN: [id number] : 1955        Procedure : * No procedures listed *          Past Medical History:   Diagnosis Date    Bipolar 2 disorder (H)     Breast cancer (H)     lumpectomy, radiation, chemo    Chronic pain syndrome     COPD (chronic obstructive pulmonary disease) (H)     asthma    Cord compression (H) 2021    Dizzy     Drug tolerance     opioid    Esophageal reflux     Fatigue     Generalized anxiety disorder     Graves disease     Hemochromatosis 2018    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; H63D not detected    History of breast cancer 2020    Formatting of this note might be different from the original. Created by Conversion  Replacement Utility updated for latest IMO load Formatting of this note might be different from the original. Created by Conversion  Replacement Utility updated for latest IMO load    History of corticosteroid therapy 2019    History of partial adrenalectomy (H24) 2019    History of pheochromocytoma 2019    Hx antineoplastic chemotherapy     Hx of radiation therapy     Hyperlipidaemia     Hypertension     Impaired fasting glucose     Injury of neck, whiplash 07/15/2021    Joint pain     KYAW (obstructive sleep apnea) 2016    Osteopenia     Pheochromocytoma, left 2017    laparoscopically removed    Postablative hypothyroidism 1995    Prediabetes 10/03/2019    by A1c    Psoriasis     Psoriatic arthropathy (H)     Right rotator cuff tear     RLS (restless legs syndrome)     on ropinorole    Sacroiliitis (H24)     Serotonin syndrome 2020    [facility] - While on desvenlafaxine 100mg    Snoring     Spinal stenosis     Status post coronary angiogram 10/03/2019    Urinary incontinence     Vitamin B 12 deficiency 2009    Vitamin D deficiency 2010      Past Surgical History:   Procedure Laterality Date    ARTHRODESIS ANKLE      ARTHROPLASTY ANKLE Right 2015     Procedure: ARTHROPLASTY ANKLE;  Surgeon: Jason Coughlin MD;  Location: [facility]    ARTHROPLASTY REVISION ANKLE Right 6/29/2015    Procedure: ARTHROPLASTY REVISION ANKLE;  Surgeon: Jason Coughlin MD;  Location: [facility]    BIOPSY BREAST      BREAST BIOPSY, CORE RT/LT      COLONOSCOPY      COLONOSCOPY N/A 2/25/2021    Procedure: COLONOSCOPY;  Surgeon: Guru Elke Tolbert MD;  Location:  GI    CV CORONARY ANGIOGRAM N/A 10/3/2019    Procedure: CV CORONARY ANGIOGRAM;  Surgeon: Bryce Pierre MD;  Location:  HEART CARDIAC CATH LAB    CV RIGHT HEART CATH MEASUREMENTS RECORDED N/A 10/3/2019    Procedure: CV RIGHT HEART CATH;  Surgeon: Bryce Pierre MD;  Location:  HEART CARDIAC CATH LAB    ESOPHAGOSCOPY, GASTROSCOPY, DUODENOSCOPY (EGD), COMBINED N/A 2/25/2021    Procedure: ESOPHAGOGASTRODUODENOSCOPY, WITH BIOPSY;  Surgeon: Guru Elke Tolbert MD;  Location:  GI    EYE SURGERY  2021    HC REMOVE TONSILS/ADENOIDS,<13 Y/O      Description: Tonsillectomy With Adenoidectomy;  Recorded: 04/07/2010;    IR LUMBAR EPIDURAL STEROID INJECTION  10/26/2004    IR LUMBAR EPIDURAL STEROID INJECTION  11/16/2004    IR LUMBAR EPIDURAL STEROID INJECTION  12/21/2004    IR LUMBAR EPIDURAL STEROID INJECTION  6/8/2006    JOINT REPLACEMENT      LAMINOPLASTY CERVICAL POSTERIOR THREE+ LEVELS Left 12/21/2021    Procedure: CERVICAL 3-CERVICAL 6 LEFT OPEN DOOR LAMINOPLASTY AND LEFT CERVICAL 4-5 AND CERVICAL 6-7 POSTERIOR FORAMINOTOMY;  Surgeon: Angela Gregory MD;  Location: [facility]    LAPAROSCOPIC ADRENALECTOMY Left 08/02/2017    pheochromocytoma    LAPAROSCOPIC ADRENALECTOMY Left 8/2/2017    Procedure: LAPAROSCOPIC LEFT ADRENALECTOMY, ;  Surgeon: Gab Linares MD;  Location: [facility];  Service:     LENGTHEN TENDON ACHILLES Right 6/29/2015    Procedure: LENGTHEN TENDON ACHILLES;  Surgeon: Jason Coughlin MD;  Location: [facility]    LUMPECTOMY BREAST       "LUMPECTOMY BREAST Left 1994    MAMMOPLASTY REDUCTION Right 2015    [place]    MAMMOPLASTY REDUCTION Right     approx late /    MASTECTOMY      left lumpectomy with axillary node dissection    MASTECTOMY MODIFIED RADICAL      OTHER SURGICAL HISTORY Right     reconstructive breast surgery    OTHER SURGICAL HISTORY      Adrenalectomy for pheochromocytoma    SC MASTECTOMY, MODIFIED RADICAL      Description: Modified Radical Mastectomy Left Breast;  Recorded: 2010;    REPAIR HAMMER TOE Right 2015    Procedure: REPAIR HAMMER TOE;  Surgeon: Jason Coughlin MD;  Location: [facility]    TONSILLECTOMY      TONSILLECTOMY & ADENOIDECTOMY      [facility] ARTHRODESIS,ANKLE,OPEN Right     Description: Ankle Arthrodesis;  Recorded: 2010;      Allergies   Allergen Reactions    Serotonin Reuptake Inhibitors (Ssris) Anxiety, Difficulty breathing, Headache, Palpitations and Shortness Of Breath    Buspirone      The patient states she had serotonin syndrome    Cephalexin      Other reaction(s): unknown rxn.    Desvenlafaxine      Serotonin syndrome    Diclofenac Sodium [Diclofenac]      Serotonin syndrome and restless legs syndrome    Gabapentin      Drove on the wrong side of the highway    Levofloxacin      \"CAN'T REMEMBER\"    Penicillins      \"SORES IN MOUTH\"    Riluzole Difficulty breathing and Swelling    Sulfa Antibiotics      \"PT DOES NOT KNOW WHAT THE REACTION WAS\"    Topiramate Other (See Comments)     Frequent urination      Social History     Tobacco Use    Smoking status: Former     Current packs/day: 0.00     Average packs/day: 2.5 packs/day for 29.2 years (72.9 ttl pk-yrs)     Types: Cigarettes     Start date: 1971     Quit date: 2000     Years since quittin.9     Passive exposure: Past    Smokeless tobacco: Never   Substance Use Topics    Alcohol use: Not Currently     Comment: relapse 2021 sober       Wt Readings from Last 1 Encounters:   24 87.7 kg (193 lb 6.4 oz)    "     Anesthesia Evaluation   Pt has had prior anesthetic.     No history of anesthetic complications       ROS/MED HX  ENT/Pulmonary:     (+) sleep apnea, uses CPAP,                        COPD,              Neurologic:       Cardiovascular: Comment: Pulmonary HTN.  Followed by Cardiology.  Next right heart cath scheduled for 6/19/2024.    (+)  hypertension- -   -  - -                                 Previous cardiac testing   Echo: Date: Results:    Stress Test:  Date: Results:    ECG Reviewed:  Date: 5/21/2024 Results:  RBBB, sinus rhythm  Cath:  Date: Results:      METS/Exercise Tolerance:     Hematologic:       Musculoskeletal: Comment: S/P Cervical Surgery      GI/Hepatic: Comment: Hepatic steatosis    (+) GERD,                   Renal/Genitourinary:       Endo: Comment: Graves Disease. S/P Radioiodine Tx.  Hx of pheochromocytoma, S/P Left Adrenalectomy 8/2017    (+)          thyroid problem,     Obesity,       Psychiatric/Substance Use: Comment: MDD, recurrent, severe, with anxious distress      Infectious Disease:       Malignancy:   (+) Malignancy, History of Breast.Breast CA Remission status post Surgery and Chemo.      Other:            Physical Exam    Airway  airway exam normal      Mallampati: II   TM distance: > 3 FB   Neck ROM: full   Mouth opening: > 3 cm    Respiratory Devices and Support         Dental       (+) Minor Abnormalities - some fillings, tiny chips      Cardiovascular   cardiovascular exam normal          Pulmonary   pulmonary exam normal            Other findings: S/P Cervical Surgery, good ROM  OUTSIDE LABS:  CBC:   Lab Results   Component Value Date    WBC 7.9 06/04/2024    WBC 7.1 05/22/2024    HGB 17.0 (H) 06/04/2024    HGB 17.7 (H) 05/22/2024    HCT 48.1 (H) 06/04/2024    HCT 49.9 (H) 05/22/2024     06/04/2024     05/22/2024     BMP:   Lab Results   Component Value Date     06/04/2024     05/22/2024    POTASSIUM 4.3 06/04/2024    POTASSIUM 5.2 06/04/2024     CHLORIDE 103 06/04/2024    CHLORIDE 104 05/22/2024    CO2 21 (L) 06/04/2024    CO2 24 05/22/2024    BUN 17.3 06/04/2024    BUN 9.3 05/22/2024    CR 0.53 06/04/2024    CR 0.57 05/22/2024    GLC 96 06/04/2024     (H) 05/22/2024     COAGS:   Lab Results   Component Value Date    PTT 34 12/13/2021    INR 0.94 12/13/2021     POC:   Lab Results   Component Value Date     (H) 02/25/2021     HEPATIC:   Lab Results   Component Value Date    ALBUMIN 3.7 06/04/2024    PROTTOTAL 7.2 06/04/2024    ALT 33 06/04/2024    AST 35 06/04/2024    ALKPHOS 80 06/04/2024    BILITOTAL 0.4 06/04/2024     OTHER:   Lab Results   Component Value Date    A1C 5.6 05/22/2024    LINDA 9.5 06/04/2024    MAG 1.9 05/22/2024    TSH 1.69 06/04/2024    T4 1.49 03/29/2024    T3 114 01/18/2023    CRP <2.9 11/16/2021    SED 8 10/17/2023       Anesthesia Plan    ASA Status:  3    NPO Status:  NPO Appropriate    Anesthesia Type: General.     - Airway: Mask Only   Induction: Intravenous.   Maintenance: N/A.        Consents    Anesthesia Plan(s) and associated risks, benefits, and realistic alternatives discussed. Questions answered and patient/representative(s) expressed understanding.     - Discussed: Risks, Benefits and Alternatives for BOTH SEDATION and the PROCEDURE were discussed     - Discussed with:  Patient      - Extended Intubation/Ventilatory Support Discussed: No.      - Patient is DNR/DNI Status: No     Use of blood products discussed: No .     Postoperative Care            Comments:               April Luciano MD    I have reviewed the pertinent notes and labs in the chart from the past 30 days and (re)examined the patient.  Any updates or changes from those notes are reflected in this note.

## 2024-06-19 NOTE — PLAN OF CARE
BEH IP Unit Acuity Rating Score (UARS)  Patient is given one point for every criteria they meet.     CRITERIA SCORING   On a 72 hour hold, court hold, committed, stay of commitment, or revocation. 0    Patient LOS on BEH unit exceeds 20 days. 1  LOS: 29   Patient under guardianship, 55+, otherwise medically complex, or under age 11. 1   Suicide ideation without relief of precipitating factors. 0   Current plan for suicide. 0   Current plan for homicide. 0   Imminent risk or actual attempt to seriously harm another without relief of factors precipitating the attempt. 0   Severe dysfunction in daily living (ex: complete neglect for self care, extreme disruption in vegetative function, extreme deterioration in social interactions). 1   Recent (last 7 days) or current physical aggression in the ED or on unit. 0   Restraints or seclusion episode in past 72 hours. 0   Recent (last 7 days) or current verbal aggression, agitation, yelling, etc., while in the ED or unit. 0   Active psychosis. 0   Need for constant or near constant redirection (from leaving, from others, etc).  0   Intrusive or disruptive behaviors. 0   Patient requires 3 or more hours of individualized nursing care per 8-hour shift (i.e. for ADLs, meds, therapeutic interventions). 0   TOTAL 3

## 2024-06-19 NOTE — PLAN OF CARE
"Individual Therapy Note      Date of Service: June 19, 2024    Patient: Winnie goes by \"Winnie,\" uses she/her pronouns    Individuals Present: Winnie Rikki [facility]    Session start: 1415  Session end: 1450  Session duration in minutes: 35      Modality Used: Person Centered, Motivational Interviewing, and Solution Focused    Goals: Make plans for future to enhance social engagement    Patient Description of current symptoms: \"more clear, organized upstairs\"     Mental Status Exam:   Attitude: cooperative  Eye Contact: good  Mood: sad , better, and good  Affect: appropriate and in normal range  Speech: clear, coherent  Psychomotor Behavior: no evidence of tardive dyskinesia, dystonia, or tics  Thought Process:  logical and linear  Associations: no loose associations  Thought Content: no evidence of suicidal ideation or homicidal ideation  Insight: good  Judgement: intact  Attention Span and Concentration: intact    Pt progress: Focus of session was on processing feelings brought up from interactions with peers on unit, and setting goals for the future. Pt reports some new patients on the unit make her and staff feel \"on-edge.\" Is hopeful she will discharge this weekend after last ECT.    Discussed Pt's goals for the future. Pt would like to celebrate 50 year wedding anniversary, volunteer at [employer], make new friends. Writer commended Pt on her progress and future plans. Pt expressed gratitude to writer for 1:1 sessions. Pt appeared less labile, more reflective and confident.    Treatment Objective(s) Addressed:   The focus of this session was on processing feelings related to discharge and identifying an appropriate aftercare plan     Progress Towards Goals and Assessment of Patient:   Patient is making progress towards treatment goals as evidenced by improved mood and plans for the future.       Therapeutic Intervention(s):   Provided active listening, unconditional positive regard, and validation.   Engaged in " guided discovery, explored patient's perspectives and helped expand them through socratic dialogue and Using CBT tools and instruction to improve insight, awareness, identifying unhealthy patterns and self-talk and replacing with healthier patterns and self-talk      Plan/next step: Writer will continue to check in with Pt, encouraged Pt to attend group sessions.      91002 - Psychotherapy (with patient) - 30 (16-37*) min    Patient Active Problem List   Diagnosis    DJD (degenerative joint disease), ankle and foot    Hereditary hemochromatosis (H24)    Pulmonary hypertension (H)    Postablative hypothyroidism    Hx of corticosteroid therapy    Class 2 obesity due to excess calories without serious comorbidity in adult    Prediabetes    KYAW (obstructive sleep apnea)    Type 2 diabetes mellitus without complication, without long-term current use of insulin (H)    Hypokalemia    COPD (chronic obstructive pulmonary disease) (H)    Generalized anxiety disorder    Restless leg syndrome    Vitamin B12 deficiency    Vitamin D deficiency    Morbid obesity (H)    Cord compression (H)    History of cervical spinal surgery    Cervical stenosis of spinal canal    Alcohol use disorder, moderate, in sustained remission (H)    Essential hypertension    Psoriatic arthropathy (H)    Primary osteoarthritis involving multiple joints    Gastroesophageal reflux disease with esophagitis without hemorrhage    Numbness in both hands    Chronic, continuous use of opioids    Trauma and stressor-related disorder    Post-menopausal    Suicidal ideation    Depression with anxiety    Severe recurrent major depression without psychotic features (H)

## 2024-06-20 ENCOUNTER — TELEPHONE (OUTPATIENT)
Dept: PSYCHIATRY | Facility: CLINIC | Age: 69
End: 2024-06-20

## 2024-06-20 PROCEDURE — G0177 OPPS/PHP; TRAIN & EDUC SERV: HCPCS

## 2024-06-20 PROCEDURE — 99232 SBSQ HOSP IP/OBS MODERATE 35: CPT | Mod: GC | Performed by: PSYCHIATRY & NEUROLOGY

## 2024-06-20 PROCEDURE — 250N000013 HC RX MED GY IP 250 OP 250 PS 637

## 2024-06-20 PROCEDURE — 124N000002 HC R&B MH UMMC

## 2024-06-20 PROCEDURE — 250N000013 HC RX MED GY IP 250 OP 250 PS 637: Performed by: PSYCHIATRY & NEUROLOGY

## 2024-06-20 PROCEDURE — 250N000013 HC RX MED GY IP 250 OP 250 PS 637: Performed by: PHYSICIAN ASSISTANT

## 2024-06-20 RX ORDER — GABAPENTIN 400 MG/1
400 CAPSULE ORAL AT BEDTIME
Qty: 30 CAPSULE | Refills: 1 | Status: SHIPPED | OUTPATIENT
Start: 2024-06-20 | End: 2024-07-08

## 2024-06-20 RX ORDER — OXYCODONE HYDROCHLORIDE 5 MG/1
5 TABLET ORAL
Qty: 140 TABLET | Refills: 0 | Status: CANCELLED | OUTPATIENT
Start: 2024-07-19

## 2024-06-20 RX ORDER — LANOLIN ALCOHOL/MO/W.PET/CERES
3 CREAM (GRAM) TOPICAL
Qty: 30 TABLET | Refills: 0 | Status: SHIPPED | OUTPATIENT
Start: 2024-06-20 | End: 2024-07-08

## 2024-06-20 RX ORDER — GABAPENTIN 100 MG/1
100 CAPSULE ORAL EVERY 6 HOURS PRN
Qty: 120 CAPSULE | Refills: 0 | Status: SHIPPED | OUTPATIENT
Start: 2024-06-20 | End: 2024-06-20

## 2024-06-20 RX ORDER — OXYCODONE HYDROCHLORIDE 5 MG/1
5 TABLET ORAL
Qty: 140 TABLET | Refills: 0 | Status: CANCELLED | OUTPATIENT
Start: 2024-06-20

## 2024-06-20 RX ORDER — GABAPENTIN 400 MG/1
400 CAPSULE ORAL AT BEDTIME
Qty: 30 CAPSULE | Refills: 0 | Status: SHIPPED | OUTPATIENT
Start: 2024-06-20 | End: 2024-06-20

## 2024-06-20 RX ORDER — GABAPENTIN 100 MG/1
100 CAPSULE ORAL EVERY 6 HOURS PRN
Qty: 120 CAPSULE | Refills: 1 | Status: SHIPPED | OUTPATIENT
Start: 2024-06-20 | End: 2024-07-08

## 2024-06-20 RX ADMIN — Medication 500 MCG: at 08:13

## 2024-06-20 RX ADMIN — LEVOTHYROXINE SODIUM 150 MCG: 75 TABLET ORAL at 06:13

## 2024-06-20 RX ADMIN — MAGNESIUM OXIDE TAB 400 MG (241.3 MG ELEMENTAL MG) 400 MG: 400 (241.3 MG) TAB at 20:46

## 2024-06-20 RX ADMIN — OXYCODONE HYDROCHLORIDE 5 MG: 5 TABLET ORAL at 18:40

## 2024-06-20 RX ADMIN — Medication 12.5 MG: at 08:13

## 2024-06-20 RX ADMIN — GUAIFENESIN 600 MG: 600 TABLET ORAL at 20:46

## 2024-06-20 RX ADMIN — ALLOPURINOL 300 MG: 300 TABLET ORAL at 20:46

## 2024-06-20 RX ADMIN — ROPINIROLE HYDROCHLORIDE 2 MG: 2 TABLET, FILM COATED ORAL at 15:48

## 2024-06-20 RX ADMIN — GABAPENTIN 100 MG: 100 CAPSULE ORAL at 15:48

## 2024-06-20 RX ADMIN — UMECLIDINIUM 1 PUFF: 62.5 AEROSOL, POWDER ORAL at 08:14

## 2024-06-20 RX ADMIN — GUAIFENESIN 600 MG: 600 TABLET ORAL at 08:13

## 2024-06-20 RX ADMIN — ACETAMINOPHEN 650 MG: 325 TABLET, FILM COATED ORAL at 06:13

## 2024-06-20 RX ADMIN — ROPINIROLE HYDROCHLORIDE 2 MG: 2 TABLET, FILM COATED ORAL at 23:59

## 2024-06-20 RX ADMIN — MAGNESIUM OXIDE TAB 400 MG (241.3 MG ELEMENTAL MG) 400 MG: 400 (241.3 MG) TAB at 08:13

## 2024-06-20 RX ADMIN — POTASSIUM CHLORIDE 20 MEQ: 1500 TABLET, EXTENDED RELEASE ORAL at 08:13

## 2024-06-20 RX ADMIN — PANTOPRAZOLE SODIUM 40 MG: 40 TABLET, DELAYED RELEASE ORAL at 08:13

## 2024-06-20 RX ADMIN — OXYCODONE HYDROCHLORIDE 5 MG: 5 TABLET ORAL at 06:13

## 2024-06-20 RX ADMIN — ROPINIROLE HYDROCHLORIDE 2 MG: 2 TABLET, FILM COATED ORAL at 20:46

## 2024-06-20 RX ADMIN — OXYCODONE HYDROCHLORIDE 5 MG: 5 TABLET ORAL at 12:32

## 2024-06-20 RX ADMIN — Medication 1000 MG: at 08:13

## 2024-06-20 RX ADMIN — FLUTICASONE FUROATE AND VILANTEROL TRIFENATATE 1 PUFF: 100; 25 POWDER RESPIRATORY (INHALATION) at 08:14

## 2024-06-20 ASSESSMENT — ACTIVITIES OF DAILY LIVING (ADL)
ORAL_HYGIENE: INDEPENDENT;PROMPTS
ADLS_ACUITY_SCORE: 41
DRESS: STREET CLOTHES;INDEPENDENT
ADLS_ACUITY_SCORE: 41
ADLS_ACUITY_SCORE: 41
ORAL_HYGIENE: INDEPENDENT;PROMPTS
LAUNDRY: WITH SUPERVISION
LAUNDRY: WITH SUPERVISION
ADLS_ACUITY_SCORE: 41
HYGIENE/GROOMING: HANDWASHING;SHOWER;INDEPENDENT
ADLS_ACUITY_SCORE: 41
HYGIENE/GROOMING: HANDWASHING;SHOWER;INDEPENDENT
ADLS_ACUITY_SCORE: 41
DRESS: STREET CLOTHES;INDEPENDENT

## 2024-06-20 NOTE — PLAN OF CARE
"Goal Outcome Evaluation:    Plan of Care Reviewed With: patient Plan of Care Reviewed With: patient    Overall Patient Progress: improvingOverall Patient Progress: improving         Problem: Adult Inpatient Plan of Care  Goal: Plan of Care Review  Description: The Plan of Care Review/Shift note should be completed every shift.  The Outcome Evaluation is a brief statement about your assessment that the patient is improving, declining, or no change.  This information will be displayed automatically on your shift  note.  Outcome: Progressing  Flowsheets (Taken 6/20/2024 1351)  Plan of Care Reviewed With: patient  Overall Patient Progress: improving       Pt slept 5.75 hours overnight. Pt will have ECT tomorrow, Friday June 21st at 1040 and will be possible discharged on Saturday the 22nd.  Discharge medications are in the lock box. Pt remained social with select peers. Pt is cooperative and medications complaint. Pt endorsed generalized chronic pain 7/10 scheduled pain medications was administered with some effects. Endorsed anxiety 3/10 and denied depression. Described her mood as \"hopeful.\"  Denied SI/HI/SIB and hallucinations. Pt was contracted for safety. Hygiene, nutrition and hydration is adequate. Blood pressure 121/76, pulse 76, temperature 97.3  F (36.3  C), temperature source Temporal, resp. rate 18, height 1.676 m (5' 6\"), weight 88.4 kg (194 lb 14.4 oz), SpO2 94%, not currently breastfeeding.    "

## 2024-06-20 NOTE — TELEPHONE ENCOUNTER
Patient is in the hospital and made appointment for September what is the MD first appointment available. Patient is asking if the MD can get her in sooner since MD is the one that put her in the hospital to follow up on the hospital stay.       Patient is asking for a call back to find out if that can be done she states that she should be released this Saturday.     Phone number is verified

## 2024-06-20 NOTE — PLAN OF CARE
BEH IP Unit Acuity Rating Score (UARS)  Patient is given one point for every criteria they meet.     CRITERIA SCORING   On a 72 hour hold, court hold, committed, stay of commitment, or revocation. 0    Patient LOS on BEH unit exceeds 20 days. 1  LOS: 30   Patient under guardianship, 55+, otherwise medically complex, or under age 11. 1   Suicide ideation without relief of precipitating factors. 0   Current plan for suicide. 0   Current plan for homicide. 0   Imminent risk or actual attempt to seriously harm another without relief of factors precipitating the attempt. 0   Severe dysfunction in daily living (ex: complete neglect for self care, extreme disruption in vegetative function, extreme deterioration in social interactions). 1   Recent (last 7 days) or current physical aggression in the ED or on unit. 0   Restraints or seclusion episode in past 72 hours. 0   Recent (last 7 days) or current verbal aggression, agitation, yelling, etc., while in the ED or unit. 0   Active psychosis. 0   Need for constant or near constant redirection (from leaving, from others, etc).  0   Intrusive or disruptive behaviors. 0   Patient requires 3 or more hours of individualized nursing care per 8-hour shift (i.e. for ADLs, meds, therapeutic interventions). 0   TOTAL 3

## 2024-06-20 NOTE — PROGRESS NOTES
"  ----------------------------------------------------------------------------------------------------------  [facility]  Psychiatry Progress Note  Hospital Day #30     Interim History:     The patient's care was discussed with the treatment team and chart notes were reviewed.  Sleep: 5 hours (06/20/24 0600)  Staff Report:  Brighter, visible in milieu. Attended groups.  Medications: Took as prescribed.  PRN: Gabapentin 100 mg for anxiety.    Last 24H PRN:     acetaminophen (TYLENOL) tablet 650 mg, 650 mg at 06/20/24 0613    gabapentin (NEURONTIN) capsule 100 mg, 100 mg at 06/19/24 1616    melatonin tablet 3 mg, 3 mg at 06/19/24 2302       Subjective:     Patient interview:   Patient was interviewed in the conference room. Winnie says \"its going good, I think I'm time\". She is ready to leave. She says she adapts easily to this environment. Says she is feeling really nervous because she has a friend who told her, \"I dont see anything wrong with you I dont know why you are there\" and makes the patient feel badly about it. Patient does not want to leave on Friday after ECT and is fine leaving Saturday. 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 Spoke fondly about her 's music.   Patient thanked the team, said they saved her life. Discussed friends she has made here. Is frustrated she cannot get in to see Dr. Varela until September. Discussed ECT aftercare.    ROS:  Patient has pain in her back and neck  Patient denies other acute concerns     Objective:     Vitals:  /76 (BP Location: Right arm)   Pulse 76   Temp 97.3  F (36.3  C) (Temporal)   Resp 18   Ht 1.676 m (5' 6\")   Wt 88.4 kg (194 lb 14.4 oz)   LMP  (LMP Unknown)   SpO2 94%   BMI 31.46 kg/m      Allergies:  Allergies   Allergen Reactions    Serotonin Reuptake Inhibitors (Ssris) Anxiety, Difficulty breathing, Headache, Palpitations and Shortness Of Breath    Buspirone      The patient states " "she had serotonin syndrome    Cephalexin      Other reaction(s): unknown rxn.    Desvenlafaxine      Serotonin syndrome    Diclofenac Sodium [Diclofenac]      Serotonin syndrome and restless legs syndrome    Gabapentin      Drove on the wrong side of the highway    Levofloxacin      \"CAN'T REMEMBER\"    Penicillins      \"SORES IN MOUTH\"    Riluzole Difficulty breathing and Swelling    Sulfa Antibiotics      \"PT DOES NOT KNOW WHAT THE REACTION WAS\"    Topiramate Other (See Comments)     Frequent urination       Current Medications:  Scheduled:  Current Facility-Administered Medications   Medication Dose Route Frequency Provider Last Rate Last Admin    acetaminophen (TYLENOL) tablet 650 mg  650 mg Oral Q4H PRN Wilner Parker MD   650 mg at 06/20/24 0613    albuterol (PROVENTIL HFA/VENTOLIN HFA) inhaler  2 puff Inhalation Q6H PRN Wilner Parker MD   2 puff at 05/31/24 1741    allopurinol (ZYLOPRIM) tablet 300 mg  300 mg Oral QPM Nallely Barber MD   300 mg at 06/19/24 2006    alum & mag hydroxide-simethicone (MAALOX) suspension 30 mL  30 mL Oral Q4H PRN Wilner Parker MD        ascorbic acid tablet 1,000 mg  1,000 mg Oral Daily Wilner Parker MD   1,000 mg at 06/20/24 0813    benzocaine-menthol (CHLORASEPTIC) 6-10 MG lozenge 1 lozenge  1 lozenge Buccal Q1H PRN Erin Ferrera MD        benzonatate (TESSALON) capsule 200 mg  200 mg Oral TID PRN Wilner Parker MD   200 mg at 06/03/24 2153    cholecalciferol (VITAMIN D3) capsule 250 mcg  250 mcg Oral Weekly Wilner Parker MD   250 mcg at 06/19/24 0938    cyanocobalamin (VITAMIN B-12) sublingual tablet 500 mcg  500 mcg Sublingual Daily Wilner Parker MD   500 mcg at 06/20/24 0813    ethacrynic acid (EDECRIN) half-tab 12.5 mg  12.5 mg Oral Daily Wilner Parker MD   12.5 mg at 06/20/24 0813    fluticasone-vilanterol (BREO ELLIPTA) 100-25 MCG/ACT inhaler 1 puff  1 puff Inhalation Daily Wilner Parker MD   1 puff at 06/20/24 0814    And    " umeclidinium (INCRUSE ELLIPTA) 62.5 MCG/ACT inhaler 1 puff  1 puff Inhalation Daily Wilner Parker MD   1 puff at 06/20/24 0814    gabapentin (NEURONTIN) capsule 100 mg  100 mg Oral Q6H PRN Wilner Parker MD   100 mg at 06/19/24 1616    gabapentin (NEURONTIN) capsule 400 mg  400 mg Oral At Bedtime Erin Ferrera MD   400 mg at 06/19/24 2302    guaiFENesin (MUCINEX) 12 hr tablet 1,200 mg  1,200 mg Oral BID PRN Wilner Parker MD        guaiFENesin (MUCINEX) 12 hr tablet 600 mg  600 mg Oral BID Jamila Jason MD   600 mg at 06/20/24 0813    HOLD MEDICATION   Does not apply HOLD Alvina Garg DO        Hold Medications for ECT (select and list medications to be held)   Does not apply HOLD Tejinder Correa MD        Hold Medications for ECT (select and list medications to be held)   Does not apply HOLD Boo Riley MD        ibuprofen (ADVIL/MOTRIN) tablet 400 mg  400 mg Oral Q6H PRN Alvina Garg DO   400 mg at 06/11/24 1417    Lidocaine (LIDOCARE) 4 % Patch 1 patch  1 patch Transdermal Q24H Lulu Zacarias MD   1 patch at 06/08/24 1114    magnesium oxide (MAG-OX) tablet 400 mg  400 mg Oral BID Anna Brewer PA-C   400 mg at 06/20/24 0813    melatonin tablet 3 mg  3 mg Oral At Bedtime PRN Wilner Parker MD   3 mg at 06/19/24 2302    menthol (Topical Analgesic) 2.5% (BENGAY VANISHING SCENT) 2.5 % topical gel   Topical Q6H PRN Anna Brewer PA-C   Given at 05/31/24 1744    naloxone (NARCAN) injection 0.2 mg  0.2 mg Intravenous Q2 Min PRN Wilner Parker MD        Or    naloxone (NARCAN) injection 0.4 mg  0.4 mg Intravenous Q2 Min PRN Wilner Parker MD        Or    naloxone (NARCAN) injection 0.2 mg  0.2 mg Intramuscular Q2 Min PRN Wilner Parker MD        Or    naloxone (NARCAN) injection 0.4 mg  0.4 mg Intramuscular Q2 Min PRN Wilner Parker MD        OLANZapine (zyPREXA) tablet 2.5 mg  2.5 mg Oral TID PRN Wilner Parker MD        Or    OLANZapine  (zyPREXA) injection 2.5 mg  2.5 mg Intramuscular TID PRN Wilner Parker MD        ondansetron (ZOFRAN ODT) ODT tab 4 mg  4 mg Oral Q6H PRN Wilner Parker MD        oxyCODONE (ROXICODONE) tablet 5 mg  5 mg Oral Once PRN Faustino Beckett MD        oxyCODONE (ROXICODONE) tablet 5 mg  5 mg Oral Q6H Jairo Choudhary MD   5 mg at 06/20/24 1232    And    oxyCODONE (ROXICODONE) tablet 5 mg  5 mg Oral Daily PRN Jairo Choudhary MD   5 mg at 06/18/24 0117    pantoprazole (PROTONIX) EC tablet 40 mg  40 mg Oral Daily Wilner Parker MD   40 mg at 06/20/24 0813    polyethylene glycol (MIRALAX) Packet 17 g  17 g Oral Daily PRN Wilner Parker MD   17 g at 06/19/24 1039    potassium chloride jeannette ER (KLOR-CON M20) CR tablet 20 mEq  20 mEq Oral Daily Wilner Parker MD   20 mEq at 06/20/24 0813    rOPINIRole (REQUIP) tablet 2 mg  2 mg Oral TID Nallely Barber MD   2 mg at 06/19/24 2302    sodium chloride (OCEAN) 0.65 % nasal spray 1 spray  1 spray Both Nostrils Q1H PRN Anna Brewer PA-C        SYNTHROID (Brand only - 75 mcg strength tablets)  150 mcg Oral Once per day on Monday Tuesday Wednesday Thursday Friday Saturday Tejinder Correa MD   150 mcg at 06/20/24 0613    And    SYNTHROID tablet 75 mcg (brand only)  75 mcg Oral Every Sunday Tejinder Correa MD   75 mcg at 06/16/24 0618    zolpidem (AMBIEN) tablet 5 mg  5 mg Oral At Bedtime PRN Wilner Parker MD   5 mg at 05/24/24 0056       PRN:  Current Facility-Administered Medications   Medication Dose Route Frequency Provider Last Rate Last Admin    acetaminophen (TYLENOL) tablet 650 mg  650 mg Oral Q4H PRN Wilner Parker MD   650 mg at 06/20/24 0613    albuterol (PROVENTIL HFA/VENTOLIN HFA) inhaler  2 puff Inhalation Q6H PRN Wilner Parker MD   2 puff at 05/31/24 1741    allopurinol (ZYLOPRIM) tablet 300 mg  300 mg Oral QPM Nallely Barber MD   300 mg at 06/19/24 2006    alum & mag hydroxide-simethicone (MAALOX) suspension 30 mL  30 mL Oral Q4H PRN  Wilner Parker MD        ascorbic acid tablet 1,000 mg  1,000 mg Oral Daily Wilner Parker MD   1,000 mg at 06/20/24 0813    benzocaine-menthol (CHLORASEPTIC) 6-10 MG lozenge 1 lozenge  1 lozenge Buccal Q1H PRN Erin Ferrera MD        benzonatate (TESSALON) capsule 200 mg  200 mg Oral TID PRN Wilner Parker MD   200 mg at 06/03/24 2153    cholecalciferol (VITAMIN D3) capsule 250 mcg  250 mcg Oral Weekly Wilner Parker MD   250 mcg at 06/19/24 0938    cyanocobalamin (VITAMIN B-12) sublingual tablet 500 mcg  500 mcg Sublingual Daily Wilner Parker MD   500 mcg at 06/20/24 0813    ethacrynic acid (EDECRIN) half-tab 12.5 mg  12.5 mg Oral Daily Wilner Parker MD   12.5 mg at 06/20/24 0813    fluticasone-vilanterol (BREO ELLIPTA) 100-25 MCG/ACT inhaler 1 puff  1 puff Inhalation Daily Wilner Parker MD   1 puff at 06/20/24 0814    And    umeclidinium (INCRUSE ELLIPTA) 62.5 MCG/ACT inhaler 1 puff  1 puff Inhalation Daily Wilner Parker MD   1 puff at 06/20/24 0814    gabapentin (NEURONTIN) capsule 100 mg  100 mg Oral Q6H PRN Wilner Parker MD   100 mg at 06/19/24 1616    gabapentin (NEURONTIN) capsule 400 mg  400 mg Oral At Bedtime Erin Ferrera MD   400 mg at 06/19/24 2302    guaiFENesin (MUCINEX) 12 hr tablet 1,200 mg  1,200 mg Oral BID PRN Wilner Parker MD        guaiFENesin (MUCINEX) 12 hr tablet 600 mg  600 mg Oral BID Jamila Jason MD   600 mg at 06/20/24 0813    HOLD MEDICATION   Does not apply HOLD Alvina Garg DO        Hold Medications for ECT (select and list medications to be held)   Does not apply HOLD Tejinder Correa MD        Hold Medications for ECT (select and list medications to be held)   Does not apply HOLD Boo Riley MD        ibuprofen (ADVIL/MOTRIN) tablet 400 mg  400 mg Oral Q6H PRN Alvina Garg DO   400 mg at 06/11/24 1417    Lidocaine (LIDOCARE) 4 % Patch 1 patch  1 patch Transdermal Q24H Lulu Zacarias MD   1 patch at  06/08/24 1114    magnesium oxide (MAG-OX) tablet 400 mg  400 mg Oral BID Anna Brewer PA-C   400 mg at 06/20/24 0813    melatonin tablet 3 mg  3 mg Oral At Bedtime PRN Wilner Parker MD   3 mg at 06/19/24 2302    menthol (Topical Analgesic) 2.5% (BENGAY VANISHING SCENT) 2.5 % topical gel   Topical Q6H PRN Anna Brewer PA-C   Given at 05/31/24 1744    naloxone (NARCAN) injection 0.2 mg  0.2 mg Intravenous Q2 Min PRN Wilner Parker MD        Or    naloxone (NARCAN) injection 0.4 mg  0.4 mg Intravenous Q2 Min PRN Wilner Parker MD        Or    naloxone (NARCAN) injection 0.2 mg  0.2 mg Intramuscular Q2 Min PRN Wilner Parker MD        Or    naloxone (NARCAN) injection 0.4 mg  0.4 mg Intramuscular Q2 Min PRN Wilner Parker MD        OLANZapine (zyPREXA) tablet 2.5 mg  2.5 mg Oral TID PRN Wilner Parker MD        Or    OLANZapine (zyPREXA) injection 2.5 mg  2.5 mg Intramuscular TID PRN Wilner Parker MD        ondansetron (ZOFRAN ODT) ODT tab 4 mg  4 mg Oral Q6H PRN Wilner Parker MD        oxyCODONE (ROXICODONE) tablet 5 mg  5 mg Oral Once PRN Faustino Beckett MD        oxyCODONE (ROXICODONE) tablet 5 mg  5 mg Oral Q6H Jairo Choudhary MD   5 mg at 06/20/24 1232    And    oxyCODONE (ROXICODONE) tablet 5 mg  5 mg Oral Daily PRN Jairo Choudhary MD   5 mg at 06/18/24 0117    pantoprazole (PROTONIX) EC tablet 40 mg  40 mg Oral Daily Wilner Parker MD   40 mg at 06/20/24 0813    polyethylene glycol (MIRALAX) Packet 17 g  17 g Oral Daily PRN Wilner Parker MD   17 g at 06/19/24 1039    potassium chloride jeannette ER (KLOR-CON M20) CR tablet 20 mEq  20 mEq Oral Daily Wilner Parker MD   20 mEq at 06/20/24 0813    rOPINIRole (REQUIP) tablet 2 mg  2 mg Oral TID Nallely Barber MD   2 mg at 06/19/24 2302    sodium chloride (OCEAN) 0.65 % nasal spray 1 spray  1 spray Both Nostrils Q1H PRN Anna Brewer, BRISEYDA        SYNTHROID (Brand only - 75 mcg strength tablets)  150 mcg Oral  Once per day on Monday Tuesday Wednesday Thursday Friday Saturday Tejinder Correa MD   150 mcg at 06/20/24 0613    And    SYNTHROID tablet 75 mcg (brand only)  75 mcg Oral Every Sunday Tejinder Correa MD   75 mcg at 06/16/24 0618    zolpidem (AMBIEN) tablet 5 mg  5 mg Oral At Bedtime PRN Wilner Parker MD   5 mg at 05/24/24 0056       Labs and Imaging:  New results:   No results found for this or any previous visit (from the past 24 hour(s)).    Data this admission:  - CBC elevated Hgb 17.7  - CMP unremarkable  - TSH normal  - UDS THC positive  - Hgb A1c normal  - Lipids LDL mildly elevated 103 but otherwise unremarkable  - Vitamin B12 unremarkable  - Folate unremarkable  - Vitamin D unremarkable  - Urinalysis unremarkable  - EKG normal sinus rhythm, RBBB QTc 458    Potential Drug Interactions:  Per Lex[employer]omp, combinations of the following drugs has a rating of D, demonstrating that the medications may interact with each other in a clinically significant manner and a patient-specific assessment was made and determined that the benefits outweighed the risks.   - Gabapentin, oxycodone, and magnesium oxide The following risks include CNS depression.   - Gabapentin and levothyroxine. The following risks include magnesium salts decreasing serum concentration of levothyroxine.     Per Lexicomp, combinations of the following drugs has a rating of C demonstrating that agents may interact in a clinically significant manner but the benefits of the combination of medications often outweigh the risk.   - Ropinirole, oxycodone, and gabapentin. The following risks include CNS depression.   - allopurinol and ethacrynic acid. The following risks include ethacrynic acid increasing concentration of allopurinol.  - ethacrynic acid and oxycodone. The following risks include oxycodone may enhanced the toxic effects of ethacrynic acid  Treatment team will monitor for potential side effects and re-evaluate as needed.           Mental  "Status Exam:     Oriented to:  Grossly Oriented  General:  Awake and Alert  Appearance:  appears stated age and Grooming is adequate  Behavior/Attitude:  cooperative and open conversational  Eye Contact:  appropriate  Psychomotor: normal and no evidence of tics, dystonia, or tardive dyskinesia no catatonia present  Speech:  appropriate volume/tone and talkative  Language: Fluent in English with appropriate syntax and vocabulary.  Mood:  \"good\"  Affect:  congruent with mood, variable, less tearful than in the past, smiling  Thought Process:  coherent and goal directed, circumstantial  Thought Content:  No HI, No VH, and No AH, no SI; No apparent delusions, sometimes has word finding difficulties  Associations:  intact  Insight:  fair due to recognizing the circumstances affecting her mental health, has a positive attitude towards her treatment  Judgment:  fair due to willingness to engage in ECT  Impulse control: good  Attention Span:  grossly intact  Concentration:  grossly intact  Recent and Remote Memory: grossly intact  Fund of Knowledge:  average  Muscle Strength and Tone: normal  Gait and Station: Normal     Psychiatric Assessment     Randi Cleary is a 68 year old female previously diagnosed with MDD, recurrent severe, substance induced mood disorder, Unspecified Trauma, CHAVO, borderline personality disorder and alcohol use disorder who presented voluntarily with SI in the context of treatment resistant MDD. On admission she presented with significant symptoms of depression incuding emotional lability, low mood, hopelessness, amotivation, anxiety, anhedonia, low energy, insomnia low appetite, excess guilt and poor concentration. Her affect was dysphroic and anxious at times with heavy perseveration on medico-surgical dignosises and passive SI causing incresed anxiety. She notes she has had a plan but no intent. Her history of extensive substance use, medical history and chronic pain contribute " biologically. Her presentation is further complicated by borderline personality disorder and history of trauma. Additionally, she has a very limited support system. However her last hospitalization was in the 1980's for a suicide attempt via Prozac overdose and has been engaged in treatment and present voluntarily. Her presentation is consistent with decompensated Major Depressive Disorder, recurrent severe. Her disorder has been treatment resistant, including to TMS, and at this time, ECT is be the best course of action to address her symptom severity. Patient warrants inpatient psychiatric hospitalization to maintain her safety. Given her improvement with each ECT treatment thus far and her concerns about the stresses of her home life, it is reasonable to keep her inpatient until she completes the full course of ECT.     Psychiatric Plan by Diagnosis      Today's changes:  - ECT #12 tomorrow     # MDD, recurrent, severe, with anxious distress  - Patient suffers from treatment resistant depression and has trialed many medications. At this point treatment team will need to do a chart review to review medication trials and determine the best medication to address depressive symptoms long term  - Pt started ECT 5/24 : Hold high-dose gabapentin/pregabalin after 6pm on the day before ECT to permit adequate seizure  - Finish ECT this Friday  - Gabapentin 100 mg every 6 hours PRN for anxiety  - 6/13 SLUMS score 26/30 indicating mild cognitive decline     #Insomnia  - Zolpidem 5mg qpm prn  - Melatonin 3 mg at bedtime PRN    Pertinent Labs/Monitoring:   - EKG 5/22 - Qtc 458 NSR, RBBB     Additional Plans:  - Patient will be treated in therapeutic milieu with appropriate individual and group therapies as described     Psychiatric Hospital Course:      Randi Cleary was admitted to [facility] as a voluntary patient for electroconvulsive therapy for lifelong history of depression. Multiple medication trials were not  effective. Symptoms notable for low mood, anhedonia, sleep difficulties, guilt and worthlessness, suicidal ideation with plan without intent.   She appears to be improving, though she still has reservations about being ready for discharge. She has stated that she feels ECT is helping, especially with her anger, so she seems to moving in the right direction. It is reassuring that she has been attending group sessions and is enjoying regular visits with her , who is likely who she will be living with once she gets discharged.   She has completed 11 ECT sessions thus far and will finish the course on Friday. She has found that ECT has been helpful and her mood and thoughts have improved.   SLUMS score on 6/13 was 26/30, indicating mild cognitive decline. She had some word findings difficulties.    6/20: Winnie continues to be much brighter than she was earlier in the hospitalization. Her anxieties now revolve mostly around what life will be like at home, though it appears that she will be able to manage these worries in the outpatient setting soon.  The risks, benefits, alternatives, and side effects were discussed and understood by the patient.     Medical Assessment and Plan     Medical diagnoses to be addressed this admission:    # Hypothyroidism  - PTA 150mcg M-Sa, 75mcg on Perez     # KYAW   - Doesn't use CPAP at home, sleep study on 06/2024      # RLS  - Continue PTA Ropinirole 2 mg PO TID  - Gabapentin 400mg qpm  - Magnesium oxide 400mg BID    #RBBB:  Previously on other EKGs and stable    #Cervical radiculopathy and myelopathy s/p cervical laminoplasty 12/2021    # Chronic Pain  - Continue PTA Roxicodone 5 mg q6h + 5 mg PRN (for max daily dose of 25 mg)  - Menthol 2.5% topical gel q6hrs prn  - TENS Unit     #Dizziness  #Headache  Reports hx of migraines, no vision changes or hearing changes. Notes this has been ongoing since she had her spinal surgery.   - CT head 5/22 unremarkable    #Gout  - Nutrition  consult  - Allopurinol 300mg qPM  - Flares in left podagra but none currently    #Vitamin B12 Deficiency  - Vit B12 500mcg daily    #Vitamin C Deficiency  - Ascorbic Acid 1000mg daily    #Vitamin D Deficiency  - 250mcg weekly    #COPD  - Breo Ellipta 1 puff daily   - Incruse Ellipta 1 puff daily  - Tessalon cap 200mg TID prn  - Albuterol 2 puffs q6hr prn  - Mucinex 600 mg BID  - Mucinex 1200mg BID prn     #GERD  #Hiatal Hernia  - Protonix 40mg daily    #Possible pulmonary HTN  #HTN  Per Medicine note, Winnie follows with cardiology here. She had right heart catheterization scheduled for 06/12/2024, though this appointment was cancelled due the present hospitalization. PTA Edecrin 12.5 mg daily and potassium supplement continued. Electrolytes and renal function stable. Recently lost 50lbs and BP has improved.   - Edecrin (Ethacrynic acid) 12.5mg daily   - Potassium chloride 20 mEq (Klor-Con) (potassium supplement)    #Hemochromatosis, hereditary  - Hgb stable at ~17    #Hepatic Steatosis  - Stable  - LFTs wnl, no abdominal pain, distention, N/V    #History of breast cancer s/p mastectomy, chemo and XRT:   - currently in remission      #Alcohol use disorder, in remission:   - No current use.Two years sober     #Hx of pheochromocytoma s/p left adrenalectomy 08/2017:   - Stable  - Follows Endocrinology for this.     #Psoriasis:  - Noted on R hang  - Uses scalp brush    Medical course: Patient was physically examined by the ED prior to being transferred to the unit and was found to be medically stable and appropriate for admission. Medicine consult was done to provide clearance for ECT. Due to patient's persistent neck pain after surgery in 2021, CT Head was done which was negative. Oxycodone changed from q6hr prn to scheduled with an extra 5mg in the day as needed per PTA regimen. Patient continued to express some nerve pain so gabapentin was increased to 400mg.   Pt had a cardiology apt outpt that she needed to move  before discharge.     Consults: Dietician, Medicine for ECT Clearance, ECT Consult, Nutrition     Checklist     Legal Status: Voluntary     Safety Assessment:   Behavioral Orders   Procedures    Code 1 - Restrict to Unit    Code 2 - 1:1 Staff Supervision     For coming down to ECT only    Discontinue 1:1 attendant for suicide risk     Order Specific Question:   I have performed an in person assessment of the patient     Answer:   Based on this assessment the patient no longer requires a one on one attendant at this point in time.     Order Specific Question:   Rationale     Answer:   Patient States able to remain safe in hospital     Order Specific Question:   Rationale     Answer:   Modifications to care environment made to mitigate safety risk     Order Specific Question:   Rationale     Answer:   Routine observations are sufficient to monitor safety.    Electroconvulsive therapy     Series of up to 12 treatments. Begin Date: 5/24/24     Treating Psychiatrist providing ECT:  Ruthann     Notified on:  5/21/24    Electroconvulsive therapy     Series of up to 12 treatments. Begin Date: 5/24/24     Treating Psychiatrist providing ECT:  Ruthann     Notified on:  5/21/24    Fall precautions    Routine Programming     As clinically indicated    Status 15     Every 15 minutes.    Suicide precautions: Suicide Risk: MODERATE; Clinical rationale to override score: lack of access to a plan for self-harm     Order Specific Question:   Suicide Risk     Answer:   MODERATE     Order Specific Question:   Clinical rationale to override score:     Answer:   lack of access to a plan for self-harm       Risk Assessment:  Risk for harm is low.  Risk factors: SI, maladaptive coping, trauma, and past behaviors  Protective factors: family and engaged in treatment     SIO: Discontinued    Disposition: Pending stabilization, medication optimization, & development of a safe discharge plan. Pt has been accepted to [address] [place] [address] program once stable.  Likely discharge is this Saturday, June 22 in the afternoon as Winnie does not want to be discharged the same day as ECT. She says her , who would pick her up, is busy on Saturday mornings in the summer, so she would be picked up in the afternoon.     Attestations      Sung Myers, MS3     Resident Attestation:   I was present with the medical student who participated in the service and in the documentation of the note.  I have verified the history and personally performed the physical exam, mental status exam, and medical decision making. I agree with the assessment and plan of care as documented in the note.     Yennifer Martinez MD  Psychiatry Resident Physician      Attestation:  This patient has been seen and evaluated by me, Jairo Choudhary MD.  I have discussed this patient with the house staff team including the resident and medical student and I agree with the findings and plan in this note.    I have reviewed today's vital signs, medications, labs and imaging. Jairo Choudhary MD , PhD.

## 2024-06-20 NOTE — PLAN OF CARE
"  Rehab Group    Start time: 1015  End time: 1145  Patient time total: 90 minutes    attended full group    #5 attended   Group Type: OT Clinic   Group Topic Covered: coping skills     Group Session Detail:    Intervention: Pt participated in a OT Clinic group to facilitate coping skills exploration and creative expression through personally meaningful activities, and to encourage utilization of these healthy coping skills to promote overall health and wellness. Group included clinical observation of social, cognitive and kinesthetic performance skills to inform treatment and safe discharge planning.    Mood/Affect: Pleasant       Plan: Patient encouraged to maintain attendance for continued ongoing support in working towards occupational therapy goals to support overall treatment/care.        Patient Detail:    Was an active participant in group for the duration of time. Was motivated to continue working on project started in a previous group. Shared that she is likely discharging soon. Appeared to have mixed feelings about this, at one point stating \"they're kicking me out. They don't want me here.\". Stated this while laughing but also appeared anxious regarding leaving.      Patient Active Problem List   Diagnosis    DJD (degenerative joint disease), ankle and foot    Hereditary hemochromatosis (H24)    Pulmonary hypertension (H)    Postablative hypothyroidism    Hx of corticosteroid therapy    Class 2 obesity due to excess calories without serious comorbidity in adult    Prediabetes    KYAW (obstructive sleep apnea)    Type 2 diabetes mellitus without complication, without long-term current use of insulin (H)    Hypokalemia    COPD (chronic obstructive pulmonary disease) (H)    Generalized anxiety disorder    Restless leg syndrome    Vitamin B12 deficiency    Vitamin D deficiency    Morbid obesity (H)    Cord compression (H)    History of cervical spinal surgery    Cervical stenosis of spinal canal    Alcohol use " disorder, moderate, in sustained remission (H)    Essential hypertension    Psoriatic arthropathy (H)    Primary osteoarthritis involving multiple joints    Gastroesophageal reflux disease with esophagitis without hemorrhage    Numbness in both hands    Chronic, continuous use of opioids    Trauma and stressor-related disorder    Post-menopausal    Suicidal ideation    Depression with anxiety    Severe recurrent major depression without psychotic features (H)

## 2024-06-20 NOTE — PLAN OF CARE
Problem: Sleep Disturbance  Goal: Adequate Sleep/Rest  Outcome: Progressing   Goal Outcome Evaluation:    Patient appears to have slept a total of 5.75 hours.     Given PRN Tylenol for headache. Denies other concerns.    Safety/environment checks conducted every 15 minutes with no further concerns noted.

## 2024-06-21 ENCOUNTER — ANESTHESIA (OUTPATIENT)
Dept: BEHAVIORAL HEALTH | Facility: CLINIC | Age: 69
DRG: 881 | End: 2024-06-21
Payer: MEDICARE

## 2024-06-21 ENCOUNTER — ANESTHESIA EVENT (OUTPATIENT)
Dept: BEHAVIORAL HEALTH | Facility: CLINIC | Age: 69
DRG: 881 | End: 2024-06-21
Payer: MEDICARE

## 2024-06-21 ENCOUNTER — APPOINTMENT (OUTPATIENT)
Dept: BEHAVIORAL HEALTH | Facility: CLINIC | Age: 69
DRG: 881 | End: 2024-06-21
Payer: MEDICARE

## 2024-06-21 PROCEDURE — 250N000013 HC RX MED GY IP 250 OP 250 PS 637

## 2024-06-21 PROCEDURE — 250N000011 HC RX IP 250 OP 636: Mod: JZ | Performed by: PSYCHIATRY & NEUROLOGY

## 2024-06-21 PROCEDURE — 370N000017 HC ANESTHESIA TECHNICAL FEE, PER MIN: Performed by: ANESTHESIOLOGY

## 2024-06-21 PROCEDURE — 99232 SBSQ HOSP IP/OBS MODERATE 35: CPT | Mod: GC | Performed by: STUDENT IN AN ORGANIZED HEALTH CARE EDUCATION/TRAINING PROGRAM

## 2024-06-21 PROCEDURE — 250N000013 HC RX MED GY IP 250 OP 250 PS 637: Performed by: PHYSICIAN ASSISTANT

## 2024-06-21 PROCEDURE — 90870 ELECTROCONVULSIVE THERAPY: CPT | Performed by: ANESTHESIOLOGY

## 2024-06-21 PROCEDURE — 90870 ELECTROCONVULSIVE THERAPY: CPT | Performed by: PSYCHIATRY & NEUROLOGY

## 2024-06-21 PROCEDURE — 124N000002 HC R&B MH UMMC

## 2024-06-21 PROCEDURE — H2032 ACTIVITY THERAPY, PER 15 MIN: HCPCS

## 2024-06-21 PROCEDURE — 250N000013 HC RX MED GY IP 250 OP 250 PS 637: Performed by: PSYCHIATRY & NEUROLOGY

## 2024-06-21 PROCEDURE — 250N000009 HC RX 250: Performed by: ANESTHESIOLOGY

## 2024-06-21 PROCEDURE — 250N000011 HC RX IP 250 OP 636: Performed by: ANESTHESIOLOGY

## 2024-06-21 PROCEDURE — 90870 ELECTROCONVULSIVE THERAPY: CPT

## 2024-06-21 PROCEDURE — GZB0ZZZ ELECTROCONVULSIVE THERAPY, UNILATERAL-SINGLE SEIZURE: ICD-10-PCS | Performed by: PSYCHIATRY & NEUROLOGY

## 2024-06-21 RX ORDER — ONDANSETRON 2 MG/ML
4 INJECTION INTRAMUSCULAR; INTRAVENOUS EVERY 30 MIN PRN
Status: CANCELLED | OUTPATIENT
Start: 2024-06-21

## 2024-06-21 RX ORDER — HYDROMORPHONE HCL IN WATER/PF 6 MG/30 ML
0.2 PATIENT CONTROLLED ANALGESIA SYRINGE INTRAVENOUS EVERY 5 MIN PRN
Status: CANCELLED | OUTPATIENT
Start: 2024-06-21

## 2024-06-21 RX ORDER — FENTANYL CITRATE 50 UG/ML
25 INJECTION, SOLUTION INTRAMUSCULAR; INTRAVENOUS EVERY 5 MIN PRN
Status: CANCELLED | OUTPATIENT
Start: 2024-06-21

## 2024-06-21 RX ORDER — OXYCODONE HYDROCHLORIDE 5 MG/1
TABLET ORAL
Qty: 240 TABLET | Refills: 0 | Status: CANCELLED | OUTPATIENT
Start: 2024-06-21

## 2024-06-21 RX ORDER — LABETALOL HYDROCHLORIDE 5 MG/ML
INJECTION, SOLUTION INTRAVENOUS PRN
Status: DISCONTINUED | OUTPATIENT
Start: 2024-06-21 | End: 2024-06-21

## 2024-06-21 RX ORDER — SODIUM CHLORIDE, SODIUM LACTATE, POTASSIUM CHLORIDE, CALCIUM CHLORIDE 600; 310; 30; 20 MG/100ML; MG/100ML; MG/100ML; MG/100ML
INJECTION, SOLUTION INTRAVENOUS CONTINUOUS
Status: CANCELLED | OUTPATIENT
Start: 2024-06-21

## 2024-06-21 RX ORDER — NALOXONE HYDROCHLORIDE 0.4 MG/ML
0.1 INJECTION, SOLUTION INTRAMUSCULAR; INTRAVENOUS; SUBCUTANEOUS
Status: CANCELLED | OUTPATIENT
Start: 2024-06-21

## 2024-06-21 RX ORDER — FENTANYL CITRATE 50 UG/ML
50 INJECTION, SOLUTION INTRAMUSCULAR; INTRAVENOUS EVERY 5 MIN PRN
Status: CANCELLED | OUTPATIENT
Start: 2024-06-21

## 2024-06-21 RX ORDER — ZOLPIDEM TARTRATE 5 MG/1
5 TABLET ORAL
Qty: 30 TABLET | Refills: 0 | Status: SHIPPED | OUTPATIENT
Start: 2024-06-21 | End: 2024-07-25

## 2024-06-21 RX ORDER — ONDANSETRON 4 MG/1
4 TABLET, ORALLY DISINTEGRATING ORAL EVERY 30 MIN PRN
Status: CANCELLED | OUTPATIENT
Start: 2024-06-21

## 2024-06-21 RX ORDER — METHOHEXITAL IN WATER/PF 100MG/10ML
SYRINGE (ML) INTRAVENOUS PRN
Status: DISCONTINUED | OUTPATIENT
Start: 2024-06-21 | End: 2024-06-21

## 2024-06-21 RX ORDER — KETOROLAC TROMETHAMINE 30 MG/ML
30 INJECTION, SOLUTION INTRAMUSCULAR; INTRAVENOUS ONCE
Status: CANCELLED
Start: 2024-06-21 | End: 2024-06-21

## 2024-06-21 RX ORDER — OXYCODONE HYDROCHLORIDE 5 MG/1
5 TABLET ORAL
Qty: 225 TABLET | Refills: 0 | Status: SHIPPED | OUTPATIENT
Start: 2024-06-21 | End: 2024-09-09

## 2024-06-21 RX ORDER — HYDROMORPHONE HCL IN WATER/PF 6 MG/30 ML
0.4 PATIENT CONTROLLED ANALGESIA SYRINGE INTRAVENOUS EVERY 5 MIN PRN
Status: CANCELLED | OUTPATIENT
Start: 2024-06-21

## 2024-06-21 RX ORDER — KETOROLAC TROMETHAMINE 30 MG/ML
30 INJECTION, SOLUTION INTRAMUSCULAR; INTRAVENOUS ONCE
Status: DISCONTINUED | OUTPATIENT
Start: 2024-06-21 | End: 2024-06-21

## 2024-06-21 RX ORDER — DEXAMETHASONE SODIUM PHOSPHATE 4 MG/ML
4 INJECTION, SOLUTION INTRA-ARTICULAR; INTRALESIONAL; INTRAMUSCULAR; INTRAVENOUS; SOFT TISSUE
Status: CANCELLED | OUTPATIENT
Start: 2024-06-21

## 2024-06-21 RX ADMIN — Medication 1000 MG: at 10:14

## 2024-06-21 RX ADMIN — OXYCODONE HYDROCHLORIDE 5 MG: 5 TABLET ORAL at 05:57

## 2024-06-21 RX ADMIN — FLUTICASONE FUROATE AND VILANTEROL TRIFENATATE 1 PUFF: 100; 25 POWDER RESPIRATORY (INHALATION) at 08:17

## 2024-06-21 RX ADMIN — SUCCINYLCHOLINE CHLORIDE 90 MG: 20 INJECTION, SOLUTION INTRAMUSCULAR; INTRAVENOUS; PARENTERAL at 09:26

## 2024-06-21 RX ADMIN — LEVOTHYROXINE SODIUM 150 MCG: 75 TABLET ORAL at 05:59

## 2024-06-21 RX ADMIN — MAGNESIUM OXIDE TAB 400 MG (241.3 MG ELEMENTAL MG) 400 MG: 400 (241.3 MG) TAB at 21:14

## 2024-06-21 RX ADMIN — Medication 3 MG: at 00:51

## 2024-06-21 RX ADMIN — OXYCODONE HYDROCHLORIDE 5 MG: 5 TABLET ORAL at 00:47

## 2024-06-21 RX ADMIN — OXYCODONE HYDROCHLORIDE 5 MG: 5 TABLET ORAL at 23:01

## 2024-06-21 RX ADMIN — GUAIFENESIN 600 MG: 600 TABLET ORAL at 08:17

## 2024-06-21 RX ADMIN — Medication 3 MG: at 22:55

## 2024-06-21 RX ADMIN — PANTOPRAZOLE SODIUM 40 MG: 40 TABLET, DELAYED RELEASE ORAL at 08:17

## 2024-06-21 RX ADMIN — MAGNESIUM OXIDE TAB 400 MG (241.3 MG ELEMENTAL MG) 400 MG: 400 (241.3 MG) TAB at 10:14

## 2024-06-21 RX ADMIN — OXYCODONE HYDROCHLORIDE 5 MG: 5 TABLET ORAL at 17:34

## 2024-06-21 RX ADMIN — GABAPENTIN 400 MG: 400 CAPSULE ORAL at 22:56

## 2024-06-21 RX ADMIN — ACETAMINOPHEN 650 MG: 325 TABLET, FILM COATED ORAL at 08:24

## 2024-06-21 RX ADMIN — POTASSIUM CHLORIDE 20 MEQ: 1500 TABLET, EXTENDED RELEASE ORAL at 10:14

## 2024-06-21 RX ADMIN — GABAPENTIN 100 MG: 100 CAPSULE ORAL at 11:44

## 2024-06-21 RX ADMIN — KETOROLAC TROMETHAMINE 30 MG: 30 INJECTION, SOLUTION INTRAMUSCULAR at 09:06

## 2024-06-21 RX ADMIN — LABETALOL HYDROCHLORIDE 20 MG: 5 INJECTION, SOLUTION INTRAVENOUS at 09:26

## 2024-06-21 RX ADMIN — POLYETHYLENE GLYCOL 3350 17 G: 17 POWDER, FOR SOLUTION ORAL at 22:55

## 2024-06-21 RX ADMIN — ROPINIROLE HYDROCHLORIDE 2 MG: 2 TABLET, FILM COATED ORAL at 14:54

## 2024-06-21 RX ADMIN — Medication 500 MCG: at 10:14

## 2024-06-21 RX ADMIN — ROPINIROLE HYDROCHLORIDE 2 MG: 2 TABLET, FILM COATED ORAL at 22:55

## 2024-06-21 RX ADMIN — GUAIFENESIN 600 MG: 600 TABLET ORAL at 21:14

## 2024-06-21 RX ADMIN — Medication 100 MG: at 09:22

## 2024-06-21 RX ADMIN — Medication 12.5 MG: at 08:17

## 2024-06-21 RX ADMIN — ALLOPURINOL 300 MG: 300 TABLET ORAL at 21:14

## 2024-06-21 RX ADMIN — UMECLIDINIUM 1 PUFF: 62.5 AEROSOL, POWDER ORAL at 08:17

## 2024-06-21 RX ADMIN — ROPINIROLE HYDROCHLORIDE 2 MG: 2 TABLET, FILM COATED ORAL at 21:14

## 2024-06-21 RX ADMIN — OXYCODONE HYDROCHLORIDE 5 MG: 5 TABLET ORAL at 11:44

## 2024-06-21 ASSESSMENT — ACTIVITIES OF DAILY LIVING (ADL)
ADLS_ACUITY_SCORE: 41
ORAL_HYGIENE: INDEPENDENT
ADLS_ACUITY_SCORE: 41
DRESS: INDEPENDENT;STREET CLOTHES
ADLS_ACUITY_SCORE: 41
HYGIENE/GROOMING: INDEPENDENT
ADLS_ACUITY_SCORE: 41
HYGIENE/GROOMING: INDEPENDENT
ADLS_ACUITY_SCORE: 41
ORAL_HYGIENE: INDEPENDENT
ADLS_ACUITY_SCORE: 41
DRESS: INDEPENDENT;STREET CLOTHES

## 2024-06-21 ASSESSMENT — COPD QUESTIONNAIRES: COPD: 1

## 2024-06-21 NOTE — DISCHARGE SUMMARY
"                                                                                                                 ----------------------------------------------------------------------------------------------------------  [facility]   Psychiatric Discharge Summary      Randi Cleary MRN# [id number]   Age: 68 year old YOB: 1955     Date of Admission:  5/21/2024  Date of Discharge:  6/22/2024  Admitting Physician:  Jairo Choudhary MD  Discharge Physician:  Giuliana Warren MD      This document serves as a transfer of care to Randi Cleary's outpatient providers.     Events Leading to Hospitalization:     Randi Cleary is a 68 year old female with previous psychiatric diagnoses of longstanding MDD (recurrent, severe, with anxious distress), SIMD (in remission), Unspecified Trauma, CHAVO (by history), likely Borderline PD & AUD (in extended remission) sent to the ER on 05/21/2024 from clinic due to concern for SI in the context of  new life stressors including loss of her therapist & continued marital strife.      Of note, patient recently underwent TMS series, completed on 04/29/24 with MADRS 33 to 25, QIDS 18 to 14, and Phq-9 21 to 11. Per her notes, she felt a \"cloud lifted\", but that other stressors worsened and she lost a therapist, which did push down on her mood. This is reflected in that she actually reached PHQ=8 for one week in the middle of treatment.      Following TMS completion, her mood symptoms rapidly deteriorated, whereby following presentation to her f/u OP MH appointment earlier today (05/21/24) profoundly tearful and dysphoric affect - were noted - to such a degree, that pt often had difficulty completing sentences - and whose overall psychiatric picture was deemed worse than that documented from her initial intake for TMS, suggestive of an acute on chronic depression.     Patient was advised to [facility] for expedited admission to inpatient setting to receive an acute ECT series for crisis stability.    See H&P by Wilner Parker MD on 6/21 for additional details.      Diagnoses:   Primary Psychiatric Diagnosis  Major depressive disorder, recurrent, severe, with anxious distress    Secondary Psychiatric Diagnoses  Borderline personality disorder  Generalized anxiety disorder  Alcohol use disorder  Substance induced mood disorder  Unspecified trauma    Psychiatric Assessment:   Randi Cleary is a 68 year old female previously diagnosed with MDD, recurrent severe, substance induced mood disorder, Unspecified Trauma, CHAVO, borderline personality disorder and alcohol use disorder who presented voluntarily with SI in the context of treatment resistant MDD. On admission she presented with significant symptoms of depression incuding emotional lability, low mood, hopelessness, amotivation, anxiety, anhedonia, low energy, insomnia low appetite, excess guilt and poor concentration. Her affect was dysphroic and anxious at times with heavy perseveration on medico-surgical dignosises and passive SI causing incresed anxiety. She noted she had a plan but no intent. Her history of extensive substance use, medical history and chronic pain contributed biologically. Her presentation was further complicated by borderline personality disorder and history of trauma. Additionally, she had a very limited support system. Her last hospitalization was in the 1980's for a suicide attempt via Prozac overdose and had been engaged in treatment and present voluntarily. Her presentation was consistent with decompensated major depressive disorder, recurrent severe. Her disorder was treatment resistant, including to TMS, which made ECT the best course of action to address her symptom severity. Patient warranted inpatient psychiatric hospitalization to maintain her safety.     Psychiatric Hospital Course:   Randi Cleary was admitted to  [facility] as a voluntary patient for electroconvulsive therapy for lifelong history of depression. Multiple medication trials were not effective. Symptoms notable for low mood, anhedonia, sleep difficulties, guilt and worthlessness, suicidal ideation with plan without intent.   She appears to be improving, though she still has reservations about being ready for discharge. She has stated that she feels ECT is helping, especially with her anger, so she seems to moving in the right direction. It is reassuring that she has been attending group sessions and is enjoying regular visits with her , who is likely who she will be living with once she gets discharged. She has found that ECT has been helpful and her mood and thoughts have improved.   SLUMS score on 6/13 was 26/30, indicating mild cognitive decline. She had some word findings difficulties.  6/20: Winnie continues to be much brighter than she was earlier in the hospitalization. Her anxieties now revolve mostly around what life will be like at home, though it appears that she will be able to manage these worries in the outpatient setting given her improvements.   The risks, benefits, alternatives, and side effects were discussed and understood by the patient.  Medication Trials and Changes: risks, benefits, alternatives, and side effects were discussed and understood by the patient.  PTA gabapentin 300 mg daily at bedtime  Level of medication adherence: complete  Behaviors: The patient was safe and appropriate and did not require chemical/physical restraints during admission. She was cooperative with cares, had good group attendance, and was visible in the milieu.  Change in psychiatric symptoms: Over the course of this hospitalization the patient's symptoms of teariness and low mood improved.  Randi was released to home. At the time of discharge she was determined to not be a danger to herself or others.     Risk Assessment:      Today Randi RIVERA Cathy Cleary  "denies/reports suicidal ideation or thoughts of self-harm. Patient has notable risk factors for self-harm, including age, comorbid medical condition of chronic pain, and recent inpatient psychiatric stay. However, risk is mitigated by commitment to family, sobriety, ability to volunteer a safety plan, and history of seeking help when needed. Therefore, based on all available evidence including the factors cited above, she does not appear to be at imminent risk for self-harm, does not meet criteria for a 72-hr hold, and therefore remains appropriate for ongoing outpatient level of care. Additional steps taken to minimize risk include: medication optimization, maintenance ECT, close psychiatric follow up and provision of crisis resources. Voluntary referral for day treatment was offered, she accepted this offer and will start this to bridge her to 55+ plus program starting in a few weeks.      Psychiatric Examination:     Mental Status Exam:  Oriented to:  Grossly Oriented  General:  Awake and Alert  Appearance:  appears stated age and Grooming is adequate  Behavior/Attitude:  Calm, Cooperative, and Engaged  Eye Contact: Appropriate  Psychomotor: Normal and No evidence of tics, dystonia, or tardive dyskinesia  no catatonia present  Speech:  appropriate volume/tone and spontaneous  Language: Fluent in English with appropriate syntax and vocabulary.  Mood:  \"better but sad to be saying goodbye\"  Affect:  appropriate, congruent with mood, and tearful  Thought Process:  coherent and goal directed  Thought Content:   No SI/HI/AH/VH; No apparent delusions  Associations:  intact  Insight:  good due to ability to recognize circumstances surrounding worsening depression  Judgment:  good due to willingness to continue maintenance ECT and make efforts to impr  Impulse control: good  Attention Span:  grossly intact and adequate for conversation  Concentration:  grossly intact  Recent and Remote Memory:  grossly intact  Fund of " Knowledge: average  Muscle Strength and Tone: normal  Gait and Station: Normal     Medical Hospital Course:     Randi Cleary was medically cleared by the ED prior to admission to the unit. PTA medications were continued on admission.     Medical Diagnoses addressed:    # Hypothyroidism  - PTA 150mcg M-Sa, 75mcg on Perez continued this admission      # KYAW   - Doesn't use CPAP at home, sleep study was rescheduled     # RLS  - Continue PTA Ropinirole 2 mg PO TID  - Gabapentin 400mg qpm  - Magnesium oxide 400mg BID     #RBBB:  Previously on other EKGs and stable     #Cervical radiculopathy and myelopathy s/p cervical laminoplasty 12/2021    # Chronic Pain  - Continue PTA Roxicodone 5 mg q6h + 5 mg PRN (for max daily dose of 25 mg)  - Menthol 2.5% topical gel q6hrs prn  - TENS Unit which helped sxs     #Dizziness  #Headache  Reports hx of migraines, no vision changes or hearing changes. Notes this has been ongoing since she had her spinal surgery.   - CT head 5/22/2024 was unremarkable  - Managed with prns and rest      #Gout  - Nutrition consult  - Allopurinol 300mg qPM  - Flares in left podagra but none currently     #Vitamin B12 Deficiency  - Vit B12 500mcg daily     #Vitamin C Deficiency  - Ascorbic Acid 1000mg daily     #Vitamin D Deficiency  - 250mcg weekly     #COPD  - Breo Ellipta 1 puff daily   - Incruse Ellipta 1 puff daily  - Tessalon cap 200mg TID prn  - Albuterol 2 puffs q6hr prn  - Mucinex 600 mg BID  - Mucinex 1200mg BID prn                #GERD  #Hiatal Hernia  - Protonix 40mg daily     #Possible pulmonary HTN  #HTN  Per Medicine note, Winnie follows with cardiology here. She had right heart catheterization scheduled for 06/12/2024, though this appointment was cancelled due the present hospitalization. PTA Edecrin 12.5 mg daily and potassium supplement continued. Electrolytes and renal function stable. Recently lost 50lbs and BP has improved. Pt said she and her  can call and make apts later  after discharge.   - Edecrin (Ethacrynic acid) 12.5mg daily   - Potassium chloride 20 mEq (Klor-Con) (potassium supplement)     #Hemochromatosis, hereditary  - Hgb stable at ~17     #Hepatic Steatosis  - Stable  - LFTs wnl, no abdominal pain, distention, N/V     #History of breast cancer s/p mastectomy, chemo and XRT:   - currently in remission      #Alcohol use disorder, in remission:   - No current use.Two years sober     #Hx of pheochromocytoma s/p left adrenalectomy 08/2017:   - Stable  - Follows Endocrinology for this.      #Psoriasis:  - Noted on R hand  - Uses scalp brush     Medical course: Patient was physically examined by the ED prior to being transferred to the unit and was found to be medically stable and appropriate for admission. Medicine consult was done to provide clearance for ECT. Due to patient's persistent neck pain after surgery in 2021, CT Head was done which was negative. Oxycodone changed from q6hr prn to scheduled with an extra 5mg in the day as needed per PTA regimen. Patient continued to express some nerve pain so gabapentin was increased to 400mg.   Pt had a cardiology apt outpt that she needed to move before discharge.      Consults: Dietician, Medicine for ECT Clearance, ECT Consult    Labs were notable for the following:  - CBC elevated Hgb 17.7  - CMP unremarkable  - TSH normal  - UDS THC positive  - Hgb A1c normal  - Lipids LDL mildly elevated 103 but otherwise unremarkable  - Vitamin B12 unremarkable  - Folate unremarkable  - Vitamin D unremarkable  - Urinalysis unremarkable  - EKG normal sinus rhythm, RBBB QTc 458     Discharge Medications:     Current Discharge Medication List        START taking these medications    Details   !! melatonin 3 MG tablet Take 1 tablet (3 mg) by mouth nightly as needed for sleep  Qty: 30 tablet, Refills: 0    Associated Diagnoses: Persistent insomnia      !! oxyCODONE (ROXICODONE) 5 MG tablet Take 1 tablet (5 mg) by mouth 5 times daily  Qty: 140  tablet, Refills: 0    Associated Diagnoses: Psoriatic arthropathy (H); Cervical stenosis of spinal canal       !! - Potential duplicate medications found. Please discuss with provider.        CONTINUE these medications which have CHANGED    Details   !! gabapentin (NEURONTIN) 100 MG capsule Take 1 capsule (100 mg) by mouth every 6 hours as needed (anxiety)  Qty: 120 capsule, Refills: 1    Associated Diagnoses: Chronic, continuous use of opioids; Numbness in both hands; Primary osteoarthritis involving multiple joints      !! gabapentin (NEURONTIN) 400 MG capsule Take 1 capsule (400 mg) by mouth at bedtime  Qty: 30 capsule, Refills: 1    Associated Diagnoses: Chronic, continuous use of opioids; Numbness in both hands; Primary osteoarthritis involving multiple joints      zolpidem (AMBIEN) 5 MG tablet Take 1 tablet (5 mg) by mouth nightly as needed for sleep  Qty: 30 tablet, Refills: 0    Associated Diagnoses: Persistent insomnia      !! oxyCODONE (ROXICODONE) 5 MG tablet Take 1 tablet (5 mg) by mouth 5 times daily May dispense/start 5/8/24  Qty: 140 tablet, Refills: 0    Associated Diagnoses: Osteoarthritis of right ankle and foot       !! - Potential duplicate medications found. Please discuss with provider.        CONTINUE these medications which have NOT CHANGED    Details   albuterol (VENTOLIN HFA) 108 (90 Base) MCG/ACT inhaler Inhale 2 puffs into the lungs every 6 hours as needed for shortness of breath, wheezing or cough  Qty: 18 g, Refills: 11    Comments: Pharmacy may dispense brand covered by insurance (Proair, or proventil or ventolin or generic albuterol inhaler)  Associated Diagnoses: Chronic obstructive pulmonary disease, unspecified COPD type (H)      allopurinol (ZYLOPRIM) 300 MG tablet Take 1 tablet (300 mg) by mouth daily  Qty: 90 tablet, Refills: 3    Associated Diagnoses: Uric acid arthropathy      benzonatate (TESSALON) 200 MG capsule Take 1 capsule (200 mg) by mouth 3 times daily as needed for  cough  Qty: 30 capsule, Refills: 1    Associated Diagnoses: Acute cough      Cyanocobalamin (VITAMIN B-12) 5000 MCG SUBL Place 2-3 sprays under the tongue daily Unknown dose. 2 or 3 sprays/day      ethacrynic acid (EDECRIN) 25 MG tablet Half pill every day  Qty: 45 tablet, Refills: 3    Associated Diagnoses: Pulmonary hypertension (H)      Fluticasone-Umeclidin-Vilanterol (TRELEGY ELLIPTA) 200-62.5-25 MCG/ACT oral inhaler Inhale 1 puff into the lungs daily  Qty: 1 each, Refills: 11    Associated Diagnoses: Chronic obstructive pulmonary disease, unspecified COPD type (H)      guaiFENesin (MUCINEX) 600 MG 12 hr tablet Take 1,200 mg by mouth 2 times daily as needed for congestion      KLOR-CON 20 MEQ CR tablet Take 1 tablet (20 mEq) by mouth daily  Qty: 90 tablet, Refills: 3    Associated Diagnoses: Pulmonary hypertension (H)      MAGNESIUM PO Take 1 capsule by mouth 2 times daily Strength unknown      medical cannabis (Patient's own supply) See Admin Instructions (The purpose of this order is to document that the patient reports taking medical cannabis.  This is not a prescription, and is not used to certify that the patient has a qualifying medical condition.)  Flower      !! melatonin 3 MG tablet Take 3 mg by mouth nightly as needed for sleep      naloxone (NARCAN) 4 MG/0.1ML nasal spray Spray 1 spray (4 mg) into one nostril alternating nostrils as needed for opioid reversal every 2-3 minutes until assistance arrives  Qty: 0.2 mL, Refills: 0    Comments: Please review administration instructions with pt on pick-up. Thanks!  Associated Diagnoses: Chronic, continuous use of opioids      omeprazole (PRILOSEC) 20 MG DR capsule Take 1 capsule (20 mg) by mouth daily  Qty: 90 capsule, Refills: 3    Associated Diagnoses: Hiatal hernia      rOPINIRole (REQUIP) 2 MG tablet One tab 3 pm, one bedtime, and one during night on waking  Qty: 90 tablet, Refills: 3    Associated Diagnoses: Restless legs syndrome (RLS)      SYNTHROID  150 MCG tablet MON to SAT 1 tablet/day; SUN 0.5 tablet  Qty: 90 tablet, Refills: 4    Associated Diagnoses: Postablative hypothyroidism      vitamin C (ASCORBIC ACID) 1000 MG TABS Take 1,000 mg by mouth daily      vitamin D3 (CHOLECALCIFEROL) 250 mcg (46627 units) capsule Take 1 capsule by mouth once a week      bisacodyl (DULCOLAX) 5 MG EC tablet Take 2 tablets at 3 pm the day before your procedure. If your procedure is before 11 am, take 2 additional tablets at 11 pm. If your procedure is after 11 am, take 2 additional tablets at 6 am. For additional instructions refer to your colonoscopy prep instructions.  Qty: 4 tablet, Refills: 0    Associated Diagnoses: Colon cancer screening      polyethylene glycol (GOLYTELY) 236 g suspension The night before the exam at 6 pm drink an 8-ounce glass every 15 minutes until the jug is half empty. If you arrive before 11 AM: Drink the other half of the Golytely jug at 11 PM night before procedure. If you arrive after 11 AM: Drink the other half of the Golytely jug at 6 AM day of procedure. For additional instructions refer to your colonoscopy prep instructions.  Qty: 4000 mL, Refills: 0    Comments: Pharmacy may substitute for equivalent.  Associated Diagnoses: Colon cancer screening      STATIN NOT PRESCRIBED (INTENTIONAL) Please choose reason not prescribed from choices below.    Associated Diagnoses: Type 2 diabetes mellitus without complication, without long-term current use of insulin (H)       !! - Potential duplicate medications found. Please discuss with provider.        STOP taking these medications       ondansetron (ZOFRAN ODT) 4 MG ODT tab Comments:   Reason for Stopping:                Discharge Plan:     ECT follow up:      You will be starting maintenance ECT starting next Friday 6/28/24              -- Remember to NOT take gabapentin after 6pm the night before each treatment  -- You will have to check to make sure somebody can drive you to and from the hospital  and monitor you for 6 hours after each treatment  -- Maintenance ECT starts weekly for several weeks, then goes down to every two weeks, then to once every month.  The goal of maintenance ECT is to space out and eventually stop  -- You cannot drive for the next week.  Then, during maintenance, you can't drive for 24 hours after each treatment.  - We discussed other alternatives including trying ketamine through the [facility] or staying on your regular medications and therapy, but at the moment you were most interested in pursuing [facility] Follow-up:   Appointment: 55 Plus Outpatient Program: You are first on the program wait list.  Program staff will contact you next week.  [facility] - [address] [facility] [address] [address]  [phone]     Appointment: Jeannette Kelly ARNP: 6/27/24 1:00pm    of  Psychiatry Clinic: [address] [place] [address], [address] [facility], [facility] [address], [place] [address]  [phone]     Individual Therapy appointment: Tuesday, July 2, 2024 @ 9am In-person   Provider: Nickie Garnett [facility]  Location: [facility], [address] [place] [address], [facility] [address] [place] [address]  Phone: [phone]  Fax: [phone]  Note: Intake paperwork to be completed at least 48 hours prior to your appointment will be sent via email ([email or web address]). This is the closest location to your residence with a provider that accepts your insurance.       Appointment: Therapy  Date/time:  Wednesday July 10th, 2024 @ 1:00 PM In person  Provider:  DERECK HERRING [facility]   Address: [place] [address]  Phone: [phone]  Fax: [phone]  Note:   This is a 60 minute appointment. Intake paperwork to be completed beforehand will be sent to [email or web address]. Please arrive 30 minutes early if you prefer to complete paperwork in person.      Attestations:     Attestation:  I saw and evaluated the patient.  I agree with the history, findings,  assessment and plan documented by Paulina Small MD.  I spent 45 minutes performing discharge day services including discussion of the hospital stay, patient evaluation, and providing discharge instructions as appropriate.    DHRUV Warren MD  [facility]  Department of Psychiatry & Behavioral Sciences

## 2024-06-21 NOTE — PLAN OF CARE
Goal Outcome Evaluation:    Rehab Group    Start time: 1315  End time: 1415  Patient time total: 120 minutes    attended full group     #3 attended   Group Type: art   Group Topic Covered: mindfulness       Group Session Detail:  Art Therapy directive was to create a future focused collage vision board. Goals of directive: to identify future short and long term goals, personal strengths ect.     Patient Response/Contribution:  cooperative with task       Patient Detail:    Pt was an engaged participant, focused on task for the full duration of group. Pt was initially hesitant to create a collage but said that she enjoyed the process once she started finding images. Pt found images whales and talked about her love of nature, wildlife and a goal of hers is to travel to [place] to see the whales there.  Pts mood was calm.        Group Therapy ([id number])    Patient Active Problem List   Diagnosis    DJD (degenerative joint disease), ankle and foot    Hereditary hemochromatosis (H24)    Pulmonary hypertension (H)    Postablative hypothyroidism    Hx of corticosteroid therapy    Class 2 obesity due to excess calories without serious comorbidity in adult    Prediabetes    KYAW (obstructive sleep apnea)    Type 2 diabetes mellitus without complication, without long-term current use of insulin (H)    Hypokalemia    COPD (chronic obstructive pulmonary disease) (H)    Generalized anxiety disorder    Restless leg syndrome    Vitamin B12 deficiency    Vitamin D deficiency    Morbid obesity (H)    Cord compression (H)    History of cervical spinal surgery    Cervical stenosis of spinal canal    Alcohol use disorder, moderate, in sustained remission (H)    Essential hypertension    Psoriatic arthropathy (H)    Primary osteoarthritis involving multiple joints    Gastroesophageal reflux disease with esophagitis without hemorrhage    Numbness in both hands    Chronic, continuous use of opioids    Trauma and stressor-related disorder     Post-menopausal    Suicidal ideation    Depression with anxiety    Severe recurrent major depression without psychotic features (H)

## 2024-06-21 NOTE — PROGRESS NOTES
"  ----------------------------------------------------------------------------------------------------------  [facility]  Psychiatry Progress Note  Hospital Day #31     Subjective:     Patient Interview: Winnie was interviewed out in the lounge. She had just finished ECT and was very teary, but she says this is normal for her. This morning, her neck and back have hurt because she hasn't been able to get her PRN pain medication yet. She feels that she is ready to go home but is very anxious about doing so. She is looking forward to seeing her cat and  when she gets home. She is very thankful for all the cares she has received here. She reports that she is very interested in pursuing maintenance ECT in the outpatient setting as per Dr. Choudhary's recommendation.      Objective:     Vitals:  /84 (BP Location: Right arm, Patient Position: Sitting, Cuff Size: Adult Regular)   Pulse 74   Temp 97.8  F (36.6  C) (Oral)   Resp 16   Ht 1.676 m (5' 6\")   Wt 88.4 kg (194 lb 14.4 oz)   LMP  (LMP Unknown)   SpO2 95%   BMI 31.46 kg/m      Mental Status Exam:  Oriented to:  Grossly Oriented  General:  Awake and Alert  Appearance:  appears stated age and Grooming is adequate  Behavior/Attitude:  cooperative and open, conversational  Eye Contact:  appropriate  Psychomotor: normal and no evidence of tics, dystonia, or tardive dyskinesia no catatonia present  Speech:  appropriate volume/tone and talkative  Language: Fluent in English with appropriate syntax and vocabulary.  Mood:  \"teary\"  Affect: congruent with mood, sad but brightens up every once in a while  Thought Process:  coherent and goal directed  Thought Content:  No HI, No VH, and No AH, no SI; No apparent delusions  Associations:  intact  Insight:  fair due to recognizing the circumstances of her mental health  Judgment:  fair due to willingness to engage in ECT  Impulse control: good  Attention Span:  grossly " intact  Concentration:  grossly intact  Recent and Remote Memory:  not formally assessed  Fund of Knowledge:  average  Muscle Strength and Tone: normal  Gait and Station: Normal     Psychiatric Assessment     Randi Cleary is a 68 year old female previously diagnosed with MDD, recurrent severe, substance induced mood disorder, Unspecified Trauma, CHAVO, borderline personality disorder and alcohol use disorder who presented voluntarily with SI in the context of treatment resistant MDD. On admission she presented with significant symptoms of depression incuding emotional lability, low mood, hopelessness, amotivation, anxiety, anhedonia, low energy, insomnia low appetite, excess guilt and poor concentration. Her affect was dysphroic and anxious at times with heavy perseveration on medico-surgical dignosises and passive SI causing incresed anxiety. She notes she has had a plan but no intent. Her history of extensive substance use, medical history and chronic pain contribute biologically. Her presentation is further complicated by borderline personality disorder and history of trauma. Additionally, she has a very limited support system. However her last hospitalization was in the 1980's for a suicide attempt via Prozac overdose and has been engaged in treatment and present voluntarily. Her presentation is consistent with decompensated Major Depressive Disorder, recurrent severe. Her disorder has been treatment resistant, including to TMS, and at this time, ECT is be the best course of action to address her symptom severity. Patient warrants inpatient psychiatric hospitalization to maintain her safety. Given her improvement with each ECT treatment thus far and her concerns about the stresses of her home life, it is reasonable to keep her inpatient until she completes the full course of ECT.     Psychiatric Plan by Diagnosis      Today's changes:  - ECT #12 performed today      # MDD, recurrent, severe, with anxious  distress  - Patient suffers from treatment resistant depression and has trialed many medications. Because most medications have failed, ECT is a reasonable option  - Pt started ECT 5/24 : Hold high-dose gabapentin/pregabalin after 6pm on the day before ECT (to permit adequate seizure)   - Finish ECT course of 12 treatments today    #Insomnia  - Zolpidem 5mg qpm prn    Pertinent Labs/Monitoring:   - EKG 5/22 - Qtc 458 NSR, RBBB     Additional Plans:  - Patient will be treated in therapeutic milieu with appropriate individual and group therapies as described     Psychiatric Hospital Course:      Randi Cleary was admitted to [facility] as a voluntary patient for electroconvulsive therapy for lifelong history of depression. Multiple medication trials were not effective. Symptoms notable for low mood, anhedonia, sleep difficulties, guilt and worthlessness, suicidal ideation with plan without intent. Continued on home meds with ECT while inpt. Did have an episode early on in ECT series while here where muscle paralytic was given before sedation. Pt was understandably agitated from this but was able to pursue further ECT as she found it helpful overall in due course.   Today is her 12th and final ECT session of her inpatient stay. This treatment has been effective in managing her symptoms this admission.    The risks, benefits, alternatives, and side effects were discussed and understood by the patient.     Medical Assessment and Plan     Medical diagnoses to be addressed this admission:    # Hypothyroidism  - PTA 150mcg M-Sa, 75mcg on Perez     # KYAW   - Doesn't use CPAP at home, sleep study on 06/2024      # RLS  - Continue PTA Ropinirole 2 mg PO TID  - Gabapentin 400mg qpm  - Magnesium Citrated changed to Magnesium Oxide 400mg BID (per patient and medicine)    #RBBB:  - Found on previous ECGs  - Continue to monitor    #Cervical radiculopathy and myelopathy s/p cervical laminoplasty 12/2021    # Chronic Pain  -  Continue PTA Roxicodone 5 mg q6h + 5 mg PRN (for max daily dose of 25 mg)  - Menthol 2.5% topical gel q6hrs prn  - TENS Unit     #Dizziness  #Headache  Reports hx of migraines, no vision changes or hearing changes. notes this has been ongoing since she had her spinal surgery.   - CT head 5/22 unremarkable    #Gout  - Nutrition consult  - Allopurinol 300mg qpm  - Flares in left podagra but none currently    #Vitamin B12 Deficiency  - Vit B12 500mcg qday    #Vitamin C Deficiency  - Ascorbic Acid 1000mg    #Vitamin D Deficiency  - 250mcg weekly    #COPD  - Breo Ellipta 1 puff daily   - Incruse Ellipta 1 puff daily  - Tessalon cap 200mg TID prn  - Albuterol 2 puffs q6hr prn  - Mucinex 1200mg BID prn     #GERD  #Hiatal Hernia  - Protonix 40mg daily    #Possible pulmonary HTN  #HTN  Per Medicine note, patient follows with cardiology here. She had a right heart catheterization scheduled for 06/12/24, this was cancelled.  PTA ethacrynic acid 12.5mg daily and potassium supplement continued. Electrolytes and renal function stable. Recently lost 50lbs and BP has improved.   - Ethacrynic acid (EDECRIN) 12.5mg daily  - Klorcon 20 mEq daily (potassium supplement)    #Hemochromatosis, hereditary  - Hgb Stable at ~17    #Hepatic Steatosis  - Stable  - LFTs wnl, no abdominal pain, distention, N/V    #History of breast cancer s/p mastectomy, chemo and XRT:   - currently in remission      #Alcohol use disorder, in remission:   - No current use. Two years sober     #Hx of pheochromocytoma s/p left adrenalectomy 08/2017:   - Stable  - Follows Endocrinology for this.     #Psoriasis:  - Noted on R hang  - Uses scalp brush    Medical course: Patient was physically examined by the ED prior to being transferred to the unit and was found to be medically stable and appropriate for admission. Medicine consult was done to provide clearance for ECT. Due to patient's persistent neck pain after surgery in 2021, CT Head was done which was negative.  Oxycodone changed from q6hr prn to scheduled with an extra 5mg in the day as needed per PTA regimen. Patient continued to express some nerve pain so gabapentin was increased to 400mg.     Medical course: Patient was physically examined by the ED prior to being transferred to the unit and was found to be medically stable and appropriate for admission.     Consults: Dietician, Medicine for ECT Clearance, ECT Consult, Nutrition     Checklist     Legal Status: Voluntary     Safety Assessment:   Behavioral Orders   Procedures    Code 1 - Restrict to Unit    Code 2 - 1:1 Staff Supervision     For coming down to ECT only    Discontinue 1:1 attendant for suicide risk     Order Specific Question:   I have performed an in person assessment of the patient     Answer:   Based on this assessment the patient no longer requires a one on one attendant at this point in time.     Order Specific Question:   Rationale     Answer:   Patient States able to remain safe in hospital     Order Specific Question:   Rationale     Answer:   Modifications to care environment made to mitigate safety risk     Order Specific Question:   Rationale     Answer:   Routine observations are sufficient to monitor safety.    Electroconvulsive therapy     Series of up to 12 treatments. Begin Date: 5/24/24     Treating Psychiatrist providing ECT:  Ruthann     Notified on:  5/21/24    Electroconvulsive therapy     Series of up to 12 treatments. Begin Date: 5/24/24     Treating Psychiatrist providing ECT:  Ruthann     Notified on:  5/21/24    Fall precautions    Routine Programming     As clinically indicated    Status 15     Every 15 minutes.    Suicide precautions: Suicide Risk: MODERATE; Clinical rationale to override score: lack of access to a plan for self-harm     Order Specific Question:   Suicide Risk     Answer:   MODERATE     Order Specific Question:   Clinical rationale to override score:     Answer:   lack of access to a plan for self-harm        Risk Assessment:  Risk for harm is moderate.  Risk factors: SI, maladaptive coping, trauma, and past behaviors  Protective factors: engaged in treatment     SIO: Discontinued    Disposition: Likely discharge is tomorrow (Saturday, June 22) between 12 and 1 as Winnie does not want to be discharged the same day as ECT. She says her , who would pick her up, is busy on Saturday mornings in the summer, so she would be picked up in the afternoon. She has been accepted to a 55+ program once she discharges.     Attestations      Sung Myers, MS3     Resident Attestation:   I was present with the medical student who participated in the service and in the documentation of the note.  I have verified the history and personally performed the physical exam, mental status exam, and medical decision making. I agree with the assessment and plan of care as documented in the note.     Yennifer Martinez MD  Psychiatry Resident Physician

## 2024-06-21 NOTE — PLAN OF CARE
"Goal Outcome Evaluation:    Plan of Care Reviewed With: patient Plan of Care Reviewed With: patient    Overall Patient Progress: improvingOverall Patient Progress: improving       Problem: Adult Inpatient Plan of Care  Goal: Plan of Care Review  Description: The Plan of Care Review/Shift note should be completed every shift.  The Outcome Evaluation is a brief statement about your assessment that the patient is improving, declining, or no change.  This information will be displayed automatically on your shift  note.  6/20/2024 1947 by Stephanie Ogden RN  Outcome: Progressing  Flowsheets (Taken 6/20/2024 1947)  Plan of Care Reviewed With: patient  Overall Patient Progress: improving    Pt will have ECT tomorrow, Friday June 21st at 1040, NPO by midnight and pt is aware. No gabapentin given after 6 pm.  Pt will be possible discharged on Saturday the 22nd.  Discharge medications are in the lock box. Pt remained social with select peers. Pt is cooperative and medications complaint. Pt endorsed generalized chronic pain 7/10 scheduled pain medications was administered with some effects. Endorsed anxiety \"mild\" anxiety and denied depression. Described her mood as \"hopeful.\"  Denied SI/HI/SIB and hallucinations. Pt was contracted for safety. Hygiene, nutrition and hydration is adequate. Blood pressure 128/77, pulse 74, temperature 98.2  F (36.8  C), temperature source Oral, resp. rate 18, height 1.676 m (5' 6\"), weight 88.4 kg (194 lb 14.4 oz), SpO2 94%, not currently breastfeeding.      "

## 2024-06-21 NOTE — ANESTHESIA CARE TRANSFER NOTE
Patient: Randi Cleary    Procedure: * No procedures listed *       Diagnosis: * No pre-op diagnosis entered *  Diagnosis Additional Information: No value filed.    Anesthesia Type:   General     Note:    Oropharynx: oropharynx clear of all foreign objects  Level of Consciousness: drowsy  Oxygen Supplementation: room air    Independent Airway: airway patency satisfactory and stable  Dentition: dentition unchanged  Vital Signs Stable: post-procedure vital signs reviewed and stable  Report to RN Given: handoff report given  Patient transferred to: PACU    Handoff Report: Identifed the Patient, Identified the Reponsible Provider, Reviewed the pertinent medical history, Discussed the surgical course, Reviewed Intra-OP anesthesia mangement and issues during anesthesia, Set expectations for post-procedure period and Allowed opportunity for questions and acknowledgement of understanding      Vitals:  Vitals Value Taken Time   BP     Temp     Pulse     Resp     SpO2         Electronically Signed By: Dylan Lemon DO  June 21, 2024  9:35 AM

## 2024-06-21 NOTE — ANESTHESIA POSTPROCEDURE EVALUATION
Patient: Randi Cleary    Procedure: * No procedures listed *       Anesthesia Type:  General    Note:  Disposition: Inpatient   Postop Pain Control: Uneventful            Sign Out: Well controlled pain   PONV: No   Neuro/Psych: Uneventful            Sign Out: Acceptable/Baseline neuro status   Airway/Respiratory: Uneventful            Sign Out: Acceptable/Baseline resp. status   CV/Hemodynamics: Uneventful            Sign Out: Acceptable CV status; No obvious hypovolemia; No obvious fluid overload   Other NRE: NONE   DID A NON-ROUTINE EVENT OCCUR? No           Last vitals:  Vitals:    06/20/24 0730 06/20/24 1600 06/21/24 0823   BP: 121/76 128/77    Pulse: 76 74    Resp: 18  18   Temp: 36.3  C (97.3  F) 36.8  C (98.2  F) 36.4  C (97.6  F)   SpO2: 94% 94% 95%       Electronically Signed By: Dylan Lemon DO  June 21, 2024  9:35 AM

## 2024-06-21 NOTE — PLAN OF CARE
"Pt had ECT today upon return to [facility] via ECT pt denied headache, alert and oriented x3, denies nausea.  VSS. Pt ate breakfast pts affect bright, pt laughing. Pt stated she felt \"good.\"  But she did not want to go home. Pt went to groups today.  Pt very thankful to staff for their care.    Problem: Adult Inpatient Plan of Care  Goal: Plan of Care Review  Description: The Plan of Care Review/Shift note should be completed every shift.  The Outcome Evaluation is a brief statement about your assessment that the patient is improving, declining, or no change.  This information will be displayed automatically on your shift  note.  Outcome: Not Progressing  Goal: Patient-Specific Goal (Individualized)  Description: You can add care plan individualizations to a care plan. Examples of Individualization might be:  \"Parent requests to be called daily at 9am for status\", \"I have a hard time hearing out of my right ear\", or \"Do not touch me to wake me up as it startles  me\".  Outcome: Not Progressing  Goal: Absence of Hospital-Acquired Illness or Injury  Outcome: Not Progressing  Goal: Optimal Comfort and Wellbeing  Outcome: Not Progressing  Intervention: Monitor Pain and Promote Comfort  Recent Flowsheet Documentation  Taken 6/21/2024 1144 by Winsome Burns RN  Pain Management Interventions: medication (see MAR)  Taken 6/21/2024 0824 by Winsome Burns, RN  Pain Management Interventions: medication (see MAR)  Goal: Readiness for Transition of Care  Outcome: Not Progressing     Problem: Adult Behavioral Health Plan of Care  Goal: Plan of Care Review  Outcome: Not Progressing  Goal: Patient-Specific Goal (Individualization)  Description: You can add care plan individualizations to a care plan. Examples of Individualization might be:  \"Parent requests to be called daily at 9am for status\", \"I have a hard time hearing out of my right ear\", or \"Do not touch me to wake me up as it startles  me\".  Outcome: Not Progressing  Goal: " Adheres to Safety Considerations for Self and Others  Outcome: Not Progressing  Goal: Absence of New-Onset Illness or Injury  Outcome: Not Progressing  Goal: Optimized Coping Skills in Response to Life Stressors  Outcome: Not Progressing  Goal: Develops/Participates in Therapeutic [place]den to Support Successful Transition  Outcome: Not Progressing   Goal Outcome Evaluation:

## 2024-06-21 NOTE — PLAN OF CARE
Problem: Adult Inpatient Plan of Care  Goal: Plan of Care Review  Description: The Plan of Care Review/Shift note should be completed every shift.  The Outcome Evaluation is a brief statement about your assessment that the patient is improving, declining, or no change.  This information will be displayed automatically on your shift  note.  Outcome: Progressing     Problem: Anxiety  Goal: Anxiety Reduction or Resolution  Outcome: Progressing   Goal Outcome Evaluation:    Plan of Care Reviewed With: patient       Pt was in room resting at the start of the shift, C/O neck and back pain, scheduled Oxycodone was given and Tens applied, pt verbalized it was somehow effective. Pt is alert and able to verbalize needs well, affect is flat, mood is depressed, and anxious about the discharge tomorrow. Pt denied SI, SIB, HI, denied A/V/Hallucination and contracted for safety. Pt was intermittently visible in the milieu, social with some select peers, calm and cooperative, teary upon assessment on what she's been undergoing through, feelings were acknowledged. Pt compliant with medication, hygiene is appropriate, no safety concerns or behavior issues.    PRNs Melatonin and Miralax,

## 2024-06-21 NOTE — PROGRESS NOTES
Patient adequate for discharge . Report called to unit nurse  Winsome VASQUEZ     . Iv removed and hemostasis achieved less than 2 min.  Patient safely transported back to unit with  staff persons.

## 2024-06-21 NOTE — PLAN OF CARE
"  Rehab Group    Start time: 1630  End time: 1715  Patient time total: 45 minutes    attended full group    #5 attended   Group Type: occupational therapy   Group Topic Covered: healthy leisure time    Doodle Dice      Group Session Detail:    Patient Response: Pt participated in a group dice activity for leisure exploration and participation as a healthy coping skill, socialization, problem solving and sequencing.     Mood/Affect:  Pleasant    Plan: Patient encouraged to maintain attendance for continued ongoing support in working towards occupational therapy goals to support overall treatment/care.        Patient Detail:    Was able to actively participate in activity with intermittent assistance from writer or another peer. Unclear if patient required assistance more d/t cognitive or visual challenges.   After group wrapped up, chatted with writer regarding expected discharge tomorrow. States she does not feel great about returning home, stating \"it's not so much the returning home as it is the people around the home\". Expressed having a strained relationship with friends. Stated \"everyone here left, so the best place is neither here nor there\" referring to the patients that she was most familiar with during admission having since discharged. Thanked writer for offering group time while on the unit.      Patient Active Problem List   Diagnosis    DJD (degenerative joint disease), ankle and foot    Hereditary hemochromatosis (H24)    Pulmonary hypertension (H)    Postablative hypothyroidism    Hx of corticosteroid therapy    Class 2 obesity due to excess calories without serious comorbidity in adult    Prediabetes    KYAW (obstructive sleep apnea)    Type 2 diabetes mellitus without complication, without long-term current use of insulin (H)    Hypokalemia    COPD (chronic obstructive pulmonary disease) (H)    Generalized anxiety disorder    Restless leg syndrome    Vitamin B12 deficiency    Vitamin D deficiency    " Morbid obesity (H)    Cord compression (H)    History of cervical spinal surgery    Cervical stenosis of spinal canal    Alcohol use disorder, moderate, in sustained remission (H)    Essential hypertension    Psoriatic arthropathy (H)    Primary osteoarthritis involving multiple joints    Gastroesophageal reflux disease with esophagitis without hemorrhage    Numbness in both hands    Chronic, continuous use of opioids    Trauma and stressor-related disorder    Post-menopausal    Suicidal ideation    Depression with anxiety    Severe recurrent major depression without psychotic features (H)

## 2024-06-21 NOTE — ANESTHESIA PREPROCEDURE EVALUATION
Anesthesia Pre-Procedure Evaluation    Patient: Randi Cleary   MRN: [id number] : 1955        Procedure : * No procedures listed *          Past Medical History:   Diagnosis Date    Bipolar 2 disorder (H)     Breast cancer (H)     lumpectomy, radiation, chemo    Chronic pain syndrome     COPD (chronic obstructive pulmonary disease) (H)     asthma    Cord compression (H) 2021    Dizzy     Drug tolerance     opioid    Esophageal reflux     Fatigue     Generalized anxiety disorder     Graves disease     Hemochromatosis 2018    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; H63D not detected    History of breast cancer 2020    Formatting of this note might be different from the original. Created by Conversion  Replacement Utility updated for latest IMO load Formatting of this note might be different from the original. Created by Conversion  Replacement Utility updated for latest IMO load    History of corticosteroid therapy 2019    History of partial adrenalectomy (H24) 2019    History of pheochromocytoma 2019    Hx antineoplastic chemotherapy     Hx of radiation therapy     Hyperlipidaemia     Hypertension     Impaired fasting glucose     Injury of neck, whiplash 07/15/2021    Joint pain     KYAW (obstructive sleep apnea) 2016    Osteopenia     Pheochromocytoma, left 2017    laparoscopically removed    Postablative hypothyroidism 1995    Prediabetes 10/03/2019    by A1c    Psoriasis     Psoriatic arthropathy (H)     Right rotator cuff tear     RLS (restless legs syndrome)     on ropinorole    Sacroiliitis (H24)     Serotonin syndrome 2020    [facility] - While on desvenlafaxine 100mg    Snoring     Spinal stenosis     Status post coronary angiogram 10/03/2019    Urinary incontinence     Vitamin B 12 deficiency 2009    Vitamin D deficiency 2010      Past Surgical History:   Procedure Laterality Date    ARTHRODESIS ANKLE      ARTHROPLASTY ANKLE Right 2015     Procedure: ARTHROPLASTY ANKLE;  Surgeon: Jason Coughlin MD;  Location: [facility]    ARTHROPLASTY REVISION ANKLE Right 6/29/2015    Procedure: ARTHROPLASTY REVISION ANKLE;  Surgeon: Jason Coughlin MD;  Location: [facility]    BIOPSY BREAST      BREAST BIOPSY, CORE RT/LT      COLONOSCOPY      COLONOSCOPY N/A 2/25/2021    Procedure: COLONOSCOPY;  Surgeon: Guru Elke Tolbert MD;  Location:  GI    CV CORONARY ANGIOGRAM N/A 10/3/2019    Procedure: CV CORONARY ANGIOGRAM;  Surgeon: Bryce Pierre MD;  Location:  HEART CARDIAC CATH LAB    CV RIGHT HEART CATH MEASUREMENTS RECORDED N/A 10/3/2019    Procedure: CV RIGHT HEART CATH;  Surgeon: Bryce Pierre MD;  Location:  HEART CARDIAC CATH LAB    ESOPHAGOSCOPY, GASTROSCOPY, DUODENOSCOPY (EGD), COMBINED N/A 2/25/2021    Procedure: ESOPHAGOGASTRODUODENOSCOPY, WITH BIOPSY;  Surgeon: Guru Elke Tolbert MD;  Location:  GI    EYE SURGERY  2021    HC REMOVE TONSILS/ADENOIDS,<11 Y/O      Description: Tonsillectomy With Adenoidectomy;  Recorded: 04/07/2010;    IR LUMBAR EPIDURAL STEROID INJECTION  10/26/2004    IR LUMBAR EPIDURAL STEROID INJECTION  11/16/2004    IR LUMBAR EPIDURAL STEROID INJECTION  12/21/2004    IR LUMBAR EPIDURAL STEROID INJECTION  6/8/2006    JOINT REPLACEMENT      LAMINOPLASTY CERVICAL POSTERIOR THREE+ LEVELS Left 12/21/2021    Procedure: CERVICAL 3-CERVICAL 6 LEFT OPEN DOOR LAMINOPLASTY AND LEFT CERVICAL 4-5 AND CERVICAL 6-7 POSTERIOR FORAMINOTOMY;  Surgeon: Angela Gregory MD;  Location: [facility]    LAPAROSCOPIC ADRENALECTOMY Left 08/02/2017    pheochromocytoma    LAPAROSCOPIC ADRENALECTOMY Left 8/2/2017    Procedure: LAPAROSCOPIC LEFT ADRENALECTOMY, ;  Surgeon: Gab Linares MD;  Location: [facility];  Service:     LENGTHEN TENDON ACHILLES Right 6/29/2015    Procedure: LENGTHEN TENDON ACHILLES;  Surgeon: Jason Coughlin MD;  Location: [facility]    LUMPECTOMY BREAST       "LUMPECTOMY BREAST Left 1994    MAMMOPLASTY REDUCTION Right 2015    [facility]    MAMMOPLASTY REDUCTION Right     approx late /    MASTECTOMY      left lumpectomy with axillary node dissection    MASTECTOMY MODIFIED RADICAL      OTHER SURGICAL HISTORY Right     reconstructive breast surgery    OTHER SURGICAL HISTORY      Adrenalectomy for pheochromocytoma    [place] MASTECTOMY, MODIFIED RADICAL      Description: Modified Radical Mastectomy Left Breast;  Recorded: 2010;    REPAIR HAMMER TOE Right 2015    Procedure: REPAIR HAMMER TOE;  Surgeon: Jason Coughlin MD;  Location: [facility]    TONSILLECTOMY      TONSILLECTOMY & ADENOIDECTOMY      [facility] ARTHRODESIS,ANKLE,OPEN Right     Description: Ankle Arthrodesis;  Recorded: 2010;      Allergies   Allergen Reactions    Serotonin Reuptake Inhibitors (Ssris) Anxiety, Difficulty breathing, Headache, Palpitations and Shortness Of Breath    Buspirone      The patient states she had serotonin syndrome    Cephalexin      Other reaction(s): unknown rxn.    Desvenlafaxine      Serotonin syndrome    Diclofenac Sodium [Diclofenac]      Serotonin syndrome and restless legs syndrome    Gabapentin      Drove on the wrong side of the highway    Levofloxacin      \"CAN'T REMEMBER\"    Penicillins      \"SORES IN MOUTH\"    Riluzole Difficulty breathing and Swelling    Sulfa Antibiotics      \"PT DOES NOT KNOW WHAT THE REACTION WAS\"    Topiramate Other (See Comments)     Frequent urination      Social History     Tobacco Use    Smoking status: Former     Current packs/day: 0.00     Average packs/day: 2.5 packs/day for 29.2 years (72.9 ttl pk-yrs)     Types: Cigarettes     Start date: 1971     Quit date: 2000     Years since quittin.9     Passive exposure: Past    Smokeless tobacco: Never   Substance Use Topics    Alcohol use: Not Currently     Comment: relapse 2021 sober       Wt Readings from Last 1 Encounters:   24 88.4 kg (194 lb 14.4 oz)    "     Anesthesia Evaluation            ROS/MED HX  ENT/Pulmonary:     (+) sleep apnea,                         COPD,              Neurologic:       Cardiovascular:     (+)  hypertension- -   -  - -                                      METS/Exercise Tolerance:     Hematologic:       Musculoskeletal:       GI/Hepatic:     (+) GERD,                   Renal/Genitourinary:       Endo:     (+)  type II DM,        thyroid problem,     Obesity,       Psychiatric/Substance Use:       Infectious Disease:       Malignancy:       Other:            Physical Exam    Airway        Mallampati: III   TM distance: > 3 FB   Neck ROM: full   Mouth opening: > 3 cm    Respiratory Devices and Support         Dental       (+) Modest Abnormalities - crowns, retainers, 1 or 2 missing teeth      Cardiovascular   cardiovascular exam normal       Rhythm and rate: regular     Pulmonary   pulmonary exam normal                OUTSIDE LABS:  CBC:   Lab Results   Component Value Date    WBC 7.9 06/04/2024    WBC 7.1 05/22/2024    HGB 17.0 (H) 06/04/2024    HGB 17.7 (H) 05/22/2024    HCT 48.1 (H) 06/04/2024    HCT 49.9 (H) 05/22/2024     06/04/2024     05/22/2024     BMP:   Lab Results   Component Value Date     06/04/2024     05/22/2024    POTASSIUM 4.3 06/04/2024    POTASSIUM 5.2 06/04/2024    CHLORIDE 103 06/04/2024    CHLORIDE 104 05/22/2024    CO2 21 (L) 06/04/2024    CO2 24 05/22/2024    BUN 17.3 06/04/2024    BUN 9.3 05/22/2024    CR 0.53 06/04/2024    CR 0.57 05/22/2024    GLC 96 06/04/2024     (H) 05/22/2024     COAGS:   Lab Results   Component Value Date    PTT 34 12/13/2021    INR 0.94 12/13/2021     POC:   Lab Results   Component Value Date     (H) 02/25/2021     HEPATIC:   Lab Results   Component Value Date    ALBUMIN 3.7 06/04/2024    PROTTOTAL 7.2 06/04/2024    ALT 33 06/04/2024    AST 35 06/04/2024    ALKPHOS 80 06/04/2024    BILITOTAL 0.4 06/04/2024     OTHER:   Lab Results   Component Value Date     A1C 5.6 05/22/2024    LINDA 9.5 06/04/2024    MAG 1.9 05/22/2024    TSH 1.69 06/04/2024    T4 1.49 03/29/2024    T3 114 01/18/2023    CRP <2.9 11/16/2021    SED 8 10/17/2023       Anesthesia Plan    ASA Status:  3       Anesthesia Type: General.   Induction: Intravenous.           Consents            Postoperative Care            Comments:               Dylan Lemon DO    I have reviewed the pertinent notes and labs in the chart from the past 30 days and (re)examined the patient.  Any updates or changes from those notes are reflected in this note.

## 2024-06-21 NOTE — PLAN OF CARE
Problem: Sleep Disturbance  Goal: Adequate Sleep/Rest  Outcome: Progressing   Goal Outcome Evaluation:    Patient appears to have slept a total of 6.25 hours. Scheduled Oxycodone given for pain. PRN melatonin given for sleep as requested. Sleeping upon reassessment. Safety/environment checks conducted every 15 minutes with no further concerns noted.

## 2024-06-21 NOTE — PLAN OF CARE
Assessment/Intervention/Current Symtoms and Care Coordination:  Chart reviewed and patient met with team,   Discussed patient progress, symptomology, and response to treatment.  Discussed the discharge plan and any potential impediments to discharge.     Patient completing last ECT today. Patient very appreciative for being able to undergo ECT and receive care here.  Affect has remains brighter. .  Patient has continued to actively participate in groups, has been social with peers/staff.      Plan is for patient to discharge home tomorrow.  She will resume care with outpatient providers.  She has been referred for individual therapy and [facility].  Patient is on the wait list and will likely be admitted next week.     Discharge Plan or Goal:  Patient will return home likely 6/22/24  Psychiatry  Therapy  [facility]     Barriers to Discharge:  None plan to discharge tomorrow     Referral Status:  [facility] IOP -intake completed 6/6. On wait list  Psychiatry/therapy scheduled     Legal Status:  Voluntary     Contacts:  Outpatient Psychiatrist[facility]  Primary Physician: Laith Constantino  Family Members: Justin Cleary (Spouse) [phone]     Upcoming Meetings and Dates/Important Information and next steps:  Team update:  Wed

## 2024-06-21 NOTE — PROCEDURES
"Procedures  [facility] [place]   ECT Procedure Note   06/21/2024    Randi Cleary is a 68 year old female patient.  [id number]    Patient Status: IN-patient    Is this the first in a series of 12 treatments?  No      Allergies   Allergen Reactions    Serotonin Reuptake Inhibitors (Ssris) Anxiety, Difficulty breathing, Headache, Palpitations and Shortness Of Breath    Buspirone      The patient states she had serotonin syndrome    Cephalexin      Other reaction(s): unknown rxn.    Desvenlafaxine      Serotonin syndrome    Diclofenac Sodium [Diclofenac]      Serotonin syndrome and restless legs syndrome    Gabapentin      Drove on the wrong side of the highway    Levofloxacin      \"CAN'T REMEMBER\"    Penicillins      \"SORES IN MOUTH\"    Riluzole Difficulty breathing and Swelling    Sulfa Antibiotics      \"PT DOES NOT KNOW WHAT THE REACTION WAS\"    Topiramate Other (See Comments)     Frequent urination       Weight:  194 lbs 14.4 oz / 88 kg          Indications for ECT:   Medications ineffective and Psychotherapies ineffective         Clinical Narrative:   HPI - The patient describes a lifelong history of depression dating back to elementary school, worsening after her father's death when she was 15yo and especially in the 1980s in the setting of worsening physical health (since falling and breaking her ankle), which led to the the initiation of fluoxetine, her first antidepressant.  Her history has been primarily characterized by low mood, with some ups and downs but no extended depression-free periods since then.  She has tried many different medications from different classes, but cannot recall any one being particularly helpful for her.  She has experienced past episodes of particularly irritable mood and concomitant decreased sleep need, although most of these have lasted 1-2 days and triggered by anger related to external stressors.  She has at least one episode of a more " "extended episode of elevated mood (and associated grandiosity, increased spending, flight of ideas, increased goal directed behaviors, pressured speech, and odd and embarrassing behavior), which occurred in the context of relapse on alcohol in 2021. She does not endorse any events before about age 48.     The patient's depression is characterized by near daily low mood, frequent anhedonia, with sleep difficulties (although c/b pain, KYAW, and RLS), fatigue, feelings of guilt/worthlessness, and impaired concentration.  She reports feelings of \"despair,\" at times with active SI with plan, but denies any intent to act on this - \"maybe it's hope.\"  She has 1 lifetime suicide attempt (overdose) in the 1980s, for which she was psychiatrically hospitalized.  She has a remote history of SIB (cutting) but not recently.       Psych pertinent item history includes includes suicide attempt , suicidal ideation, SIB , aggression, trauma hx, substance use: alcohol, cannabis, and Patient has a history of alcohol dependence treated 20+ years ago and she relapsed in 2020 for a couple of months, in her 20s she\" loved getting high\" on cocaine, LSD, mushrooms, speed, white cross, mutiple psychotropic trials , psych hosp, ketamine, and major medical problems.    Target Symptoms for Improvement: Amotivation, Fatigue, Improved self-image and Panic attacks         Diagnosis:   Major depression         Assessment:   #1 05/24/24 Some circumstantial thought, needs some redirection, 3.5 hours sleep last night, anxious, mood 1/10, PDW but no SI, never had ECT before - only TMS. No AVH.   #2 05/29/24  Mood 4/10, better over w/e, some word find difficulties, no AVH/SI/PDW. Chronic sciatica pain, neck and shoulder pain - didn't worsen with ECT. Mild headache.   #3 05/31/24  Mood 4/10, No SI, PDW, AVH, some wrist pain and headache, memory might be improving.    #4 6/3/24  Phq-9= 13, mood 3/10.  Yesterday was very tearful, still a bit today.  Last " "treatment had awareness under paralysis.  Otherwise, some initial confusion after first ECT but no subsequent cognitive effects.  Signed consent to continue.  #5 6/5/24 Mood 4-5/10  She feels like her \"rage\" is less and she feels lighter. but no cognitive side effects. She complains of excessive sweating and is concerned her thryoid is unbalanced. She is somatically focused She reports pain on the side of her neck radiating down to her chest. She had a migraine she thinks over the weekend which usual starts as occipital tension.  #6 6/7/24 mood 4-5/10. No SI. She does have some baseline long term memory difficulties no AVH.   #7 6/10/24  She still is worried that She is not able to go home. She had visitors this weekend who said \"you don't seem to have the weight of the world on your shoulders anymore\" she disagrees she had a downward spiral over the weekend when there was a mix up with her clothes and she usually feels tearful after ECT. She does not report anything feeling worse. She gets down on herself when she has an anxiety spiral and it was the 1st one she has had since admission.   #8 6/12/24 Winnie reported some tearfulness overnight. She is noticing a weight lifting mood 6/10 . Reports some difficulty with remembering names but believes this is at baseline.   #9 6/14/24 Mood 5/10 (10 best) She feels like she is improving each time . She still has occasional crying spells but she feels she bounces back quicker. She is still anxious about going home. No Si. Tolerating ECT  #10 06/17/24 mood 5/10 She does feellike she has gotten some improvement since starting Ect but still has times where she gets tearful and anxious \"goes down the rabit hole\" but not as often . She has had ketamine troches before with pain  management to no effect but wonders about ketamine infusions.  #11 06/19/24 Mood today is 5/10. Patient is wondering whether she could do a ketamine course (did have ketamine in the past), despite of being " "on opioids. Feels that ketamine can help with \"anger, tearing and anxiety.\" She complains to the anesthesiologist about recent urinary incontinence which needs to be considered when  using ketamine. She wants to try it for anger ,tearing and anxiety which is not a standard indication  #12 06/21/24 Mood 5/10, no PDW/SI, but some anxiety around pain this AM.  Patient is interested in maintenance ECT at her attending psychiatrist's recommendation to prevent the possibility of her mood dipping as low as it did previously that led to her hospitalization.  Discussed pros and cons of maintenance ECT, suggested alternative of maintenance medications/therapy and/or watchful waiting with possibility of retreatment should she relapse, as well as alternate interventions including ketamine.  At the moment, she is most interested in pursuing maintenance ECT - will plan for next Friday pending confirmation with her family that she has post-ECT ride and monitoring.           Pause for the Cause:     Correct patient Yes   Correct procedure/laterality settings: Yes           Intra-Procedure Documentation:     ECT #: 12   Treatment number this series: 12   Total treatment number: 12     Type of ECT:  Right, unilateral ultrabrief    ECT Medications:    Toradol 30mg iv - for headache/myalgia     Brevital: 100 mg (incr from 90 mg as still awake)   Succinyl Choline: 90 mg    BP - nicardipine 1500 mcg         ECT Strip Summary: RUL Titration #3: 38.4 mC   Energy Level:  345.6mC, 0.3 ms, 90 Hz, 8 sec, 800 mA     Motor Seizure Duration: 16  seconds  EEG Seizure Duration: 18 seconds  Increase next time to preserve seizure quality    Complications: none  Plan:   - Completed acute course of RUL ECT  - Plan for maintenance ECT starting 6/28/24  - Monitor depression severity with clinical assessment augmented with PHQ9 every other treatment  - Continue current medications    Discharge instructions:   - Switch from acute to maintenance ECT " starting next Friday   -- Remember to NOT take gabapentin after 6pm the night before each treatment  -- You will have to check to make sure somebody can drive you to and from the hospital and monitor you for 6 hours after each treatment  -- Maintenance ECT starts weekly for several weeks, then goes down to every two weeks, then to once every month.  The goal of maintenance ECT is to space out and eventually stop  -- You cannot drive for the next week.  Then, during maintenance, you can't drive for 24 hours after each treatment.  - We discussed other alternatives including trying ketamine through the [facility] or staying on your regular medications and therapy, but at the moment you were most interested in pursuing maintenance    - Per Dr Varela:   - Consider trial of serotonin modulator (e.g., vilazodone vs. vortioxetine) or novel agent Auvelity  - Consider augmentation with atypical antipsychotic (e.g., quetiapine or aripiprazole), though there are concerns given pre-diabetes      Toan Cotter MD  Dept of Psychiatry

## 2024-06-21 NOTE — PLAN OF CARE
BEH IP Unit Acuity Rating Score (UARS)  Patient is given one point for every criteria they meet.     CRITERIA SCORING   On a 72 hour hold, court hold, committed, stay of commitment, or revocation. 0    Patient LOS on BEH unit exceeds 20 days. 1  LOS: 31   Patient under guardianship, 55+, otherwise medically complex, or under age 11. 1   Suicide ideation without relief of precipitating factors. 0   Current plan for suicide. 0   Current plan for homicide. 0   Imminent risk or actual attempt to seriously harm another without relief of factors precipitating the attempt. 0   Severe dysfunction in daily living (ex: complete neglect for self care, extreme disruption in vegetative function, extreme deterioration in social interactions). 1   Recent (last 7 days) or current physical aggression in the ED or on unit. 0   Restraints or seclusion episode in past 72 hours. 0   Recent (last 7 days) or current verbal aggression, agitation, yelling, etc., while in the ED or unit. 0   Active psychosis. 0   Need for constant or near constant redirection (from leaving, from others, etc).  0   Intrusive or disruptive behaviors. 0   Patient requires 3 or more hours of individualized nursing care per 8-hour shift (i.e. for ADLs, meds, therapeutic interventions). 0   TOTAL 3

## 2024-06-22 VITALS
OXYGEN SATURATION: 95 % | SYSTOLIC BLOOD PRESSURE: 154 MMHG | TEMPERATURE: 97.5 F | DIASTOLIC BLOOD PRESSURE: 72 MMHG | RESPIRATION RATE: 16 BRPM | BODY MASS INDEX: 31.32 KG/M2 | HEIGHT: 66 IN | WEIGHT: 194.9 LBS | HEART RATE: 86 BPM

## 2024-06-22 PROCEDURE — 250N000013 HC RX MED GY IP 250 OP 250 PS 637: Performed by: PSYCHIATRY & NEUROLOGY

## 2024-06-22 PROCEDURE — 99239 HOSP IP/OBS DSCHRG MGMT >30: CPT | Mod: GC | Performed by: PSYCHIATRY & NEUROLOGY

## 2024-06-22 PROCEDURE — 250N000013 HC RX MED GY IP 250 OP 250 PS 637

## 2024-06-22 PROCEDURE — 250N000013 HC RX MED GY IP 250 OP 250 PS 637: Performed by: PHYSICIAN ASSISTANT

## 2024-06-22 RX ADMIN — UMECLIDINIUM 1 PUFF: 62.5 AEROSOL, POWDER ORAL at 09:02

## 2024-06-22 RX ADMIN — OXYCODONE HYDROCHLORIDE 5 MG: 5 TABLET ORAL at 03:53

## 2024-06-22 RX ADMIN — FLUTICASONE FUROATE AND VILANTEROL TRIFENATATE 1 PUFF: 100; 25 POWDER RESPIRATORY (INHALATION) at 09:02

## 2024-06-22 RX ADMIN — Medication 12.5 MG: at 08:24

## 2024-06-22 RX ADMIN — Medication 500 MCG: at 08:24

## 2024-06-22 RX ADMIN — LEVOTHYROXINE SODIUM 150 MCG: 75 TABLET ORAL at 06:23

## 2024-06-22 RX ADMIN — POTASSIUM CHLORIDE 20 MEQ: 1500 TABLET, EXTENDED RELEASE ORAL at 08:24

## 2024-06-22 RX ADMIN — MAGNESIUM OXIDE TAB 400 MG (241.3 MG ELEMENTAL MG) 400 MG: 400 (241.3 MG) TAB at 08:24

## 2024-06-22 RX ADMIN — PANTOPRAZOLE SODIUM 40 MG: 40 TABLET, DELAYED RELEASE ORAL at 08:23

## 2024-06-22 RX ADMIN — GUAIFENESIN 600 MG: 600 TABLET ORAL at 08:24

## 2024-06-22 RX ADMIN — OXYCODONE HYDROCHLORIDE 5 MG: 5 TABLET ORAL at 06:22

## 2024-06-22 RX ADMIN — Medication 1000 MG: at 08:24

## 2024-06-22 RX ADMIN — OXYCODONE HYDROCHLORIDE 5 MG: 5 TABLET ORAL at 12:27

## 2024-06-22 ASSESSMENT — ACTIVITIES OF DAILY LIVING (ADL)
ADLS_ACUITY_SCORE: 41
HYGIENE/GROOMING: INDEPENDENT
LAUNDRY: WITH SUPERVISION
ADLS_ACUITY_SCORE: 41
ORAL_HYGIENE: INDEPENDENT
DRESS: STREET CLOTHES;INDEPENDENT

## 2024-06-22 NOTE — PROGRESS NOTES
Pt was discharged into the care of her spouse (Justin Cleary) around 1250. Discharge teaching, including a review of the f/u care set-up by [facility], as well as medication teaching, complete. Pt denies SI/SIB/HI. in a . All belongings were returned to pt  upon discharge.

## 2024-06-22 NOTE — PLAN OF CARE
Problem: Sleep Disturbance  Goal: Adequate Sleep/Rest  Outcome: Progressing   Goal Outcome Evaluation:    Patient appears to have slept a total of 5.5 hours. Pt given PRN and scheduled Oxycodone for neck and shoulder pain management. Sleeping upon reassessment. Denied other concerns.     Safety/environment checks conducted every 15 minutes with no further concerns noted.

## 2024-06-22 NOTE — PLAN OF CARE
Patient visible in milieu this shift. Patient denied mental health sx including SI/SIB, did appeared anxious. Patient was compliant with medications, refused patch. Patient is eating and drinking adequately. Patient pleasant. No behavioral issues observed or reported, was seen by on-call provider prior to discharge.    Problem: Adult Inpatient Plan of Care  Goal: Plan of Care Review  Outcome: Progressing     Plan of Care Reviewed With: Patient

## 2024-06-24 ENCOUNTER — PRE VISIT (OUTPATIENT)
Dept: SURGERY | Facility: CLINIC | Age: 69
End: 2024-06-24

## 2024-06-24 ENCOUNTER — TELEPHONE (OUTPATIENT)
Dept: PSYCHIATRY | Facility: CLINIC | Age: 69
End: 2024-06-24

## 2024-06-24 ENCOUNTER — ANESTHESIA EVENT (OUTPATIENT)
Dept: GASTROENTEROLOGY | Facility: CLINIC | Age: 69
End: 2024-06-24

## 2024-06-24 ENCOUNTER — PATIENT OUTREACH (OUTPATIENT)
Dept: CARE COORDINATION | Facility: CLINIC | Age: 69
End: 2024-06-24

## 2024-06-24 NOTE — PROGRESS NOTES
Connected Care Resource Center: [facility]    Background: Transitional Care Management program identified per system criteria and reviewed by [facility] team for possible outreach.    Assessment: Upon chart review, CCR Team member will not proceed with patient outreach related to this episode of Transitional Care Management program due to reason below:    Patient has active communication with a nurse, provider or care team for reason of post-hospital follow up plan.  Outreach call by [facility] team not indicated to minimize duplicative efforts.     Plan: Transitional Care Management episode addressed appropriately per reason noted above.      TANIKA Horvath  [facility], [facility]    *Connected Care Resource Team does NOT follow patient ongoing. Referrals are identified based on internal discharge reports and the outreach is to ensure patient has an understanding of their discharge instructions.

## 2024-06-24 NOTE — TELEPHONE ENCOUNTER
Returned call to patient  Patient says she reports she felt she was doing well while inpatient, however since discharge she feels she is not doing so well.  Initially she reported poor memory, however she appears to remember much of what she feels she has forgotten.  She noted that she felt her perception of things had changed, she gave the example that the parking lot at the hospital seemed different, and she felt she had trouble remembering where her medications were (but was able to find them). She also felt she could not remember who was on her call list.    Patient expressed worsening anxiety since discharge.  She reported that she was not expecting to be discharged, and feels that because it was the weekend she had no one to call.  She feels her anxiety is high. She took one PRN gabapentin, but it did not help and her anxiety worsened.  I reassured Winnie that she could still take a PRN  dose and that with the knowledge she was able to call people, it might help.    Winnie denies active thoughts of self harm or harm to others, but said the thought of this was contributing to her anxiety. She said her  was off work and with her so she wasn't alone.  I provided the 9-8-8 phone number for her to call.    Winnie seemed concerned / confused about some of her follow up and what the abbreviations meant.  We discussed her scheduled follow up and who it was with.  Future Appointments   Date Time Provider Department Center   6/27/2024  1:00 PM Jeannette Mendoza APRN CNP [facility]   6/28/2024 10:40 [facility]   7/2/2024 10:45 AM [facility] [place]   7/9/2024 10:30 AM Kirstin Delatorre, PhD LP [facility]   7/12/2024 11:00 AM Sharmila Gregory MD [facility]   7/16/2024 10:30 AM Kirstin Delatorre, PhD  [facility]       I noted that she is on the list for the 55+ group. She asked if she was still able to attend the group with Deborah Barba.  (I sent her a [employer] message about this after the call was over as the group is no  longer running)    Patient was worried about her scheduled colonoscopy. I reassured her that I felt she would be abl eto tolerate it with preparation and that it is good to have the test done as recommended.    Patient knows some relaxation techniques, but does not practice them routinely. I encouraged her to try to get in to a routine for the exercises.    Patient had a previsit call for the next scheduled appointment so we ended the call.

## 2024-06-24 NOTE — TELEPHONE ENCOUNTER
Patient is asking for a call back states that she is not doing well from getting out of the hospital. Patient states that she is having issues with memory and she is scared.   Patient would like a call back ASAP. No other information given.     Phone number verified

## 2024-06-25 ENCOUNTER — TELEPHONE (OUTPATIENT)
Dept: BEHAVIORAL HEALTH | Facility: CLINIC | Age: 69
End: 2024-06-25
Payer: MEDICARE

## 2024-06-25 ENCOUNTER — HOSPITAL ENCOUNTER (OUTPATIENT)
Facility: CLINIC | Age: 69
End: 2024-06-25
Attending: INTERNAL MEDICINE | Admitting: INTERNAL MEDICINE
Payer: MEDICARE

## 2024-06-25 DIAGNOSIS — R06.02 SOB (SHORTNESS OF BREATH): ICD-10-CM

## 2024-06-25 DIAGNOSIS — R06.02 SOB (SHORTNESS OF BREATH): Primary | ICD-10-CM

## 2024-06-25 DIAGNOSIS — I27.20 PULMONARY HYPERTENSION (H): ICD-10-CM

## 2024-06-25 RX ORDER — LIDOCAINE 40 MG/G
CREAM TOPICAL
OUTPATIENT
Start: 2024-06-25

## 2024-06-25 NOTE — TELEPHONE ENCOUNTER
----- Message from Ting Perez sent at 6/24/2024  2:45 PM CDT -----  Regarding: Patient starting IOP/DT-3 on 7/1  Adult Mental Health Programmatic Care Schedule Request    Patient Name: Randi Cleary  Location of programming: [facility]  Start Date: 7/1/24    Group/PROVIDER: ADMISSION IOP AM TRACK [[id number]]   Appt Time: 12pm   Duration of Appointment in minutes: 60 minutes   Visit Type: Treatment [870]   Attending Provider: Roland Das     Adult Program Group: Adult Program Group: IOP/DT 3  55+ Track [[id number]]  Schedule: M, W, Th, F 1pm-4pm  12 hours per week for 9 weeks  Number of visits to be scheduled: 36 days    Attending Provider (MD):  Dr Roland Das.  Visit Type:  In-Person    Accommodations Needed: N/A  Alerts Identified/Substantiation: N/A  Consulted with Supervisor: N/A    Send to:   [[facility] ([id number])] ##ONLY if Start Date is determined##  [facility] ([facility] OUTPATIENT [facility]ESS[facility] [[id number]])   Ting Perez ([facility])  Rizwana Jackson (PHP)

## 2024-06-26 ENCOUNTER — TELEPHONE (OUTPATIENT)
Dept: CARDIOLOGY | Facility: CLINIC | Age: 69
End: 2024-06-26
Payer: MEDICARE

## 2024-06-27 ENCOUNTER — VIRTUAL VISIT (OUTPATIENT)
Dept: PSYCHIATRY | Facility: CLINIC | Age: 69
End: 2024-06-27
Payer: MEDICARE

## 2024-06-27 DIAGNOSIS — F41.1 GAD (GENERALIZED ANXIETY DISORDER): Primary | ICD-10-CM

## 2024-06-27 DIAGNOSIS — F33.2 SEVERE RECURRENT MAJOR DEPRESSION WITHOUT PSYCHOTIC FEATURES (H): ICD-10-CM

## 2024-06-27 DIAGNOSIS — F43.9 TRAUMA AND STRESSOR-RELATED DISORDER: ICD-10-CM

## 2024-06-27 PROCEDURE — 90833 PSYTX W PT W E/M 30 MIN: CPT | Mod: 95

## 2024-06-27 PROCEDURE — G2211 COMPLEX E/M VISIT ADD ON: HCPCS | Mod: 95

## 2024-06-27 PROCEDURE — 99214 OFFICE O/P EST MOD 30 MIN: CPT | Mod: 95

## 2024-06-27 ASSESSMENT — ANXIETY QUESTIONNAIRES
1. FEELING NERVOUS, ANXIOUS, OR ON EDGE: MORE THAN HALF THE DAYS
6. BECOMING EASILY ANNOYED OR IRRITABLE: MORE THAN HALF THE DAYS
GAD7 TOTAL SCORE: 11
GAD7 TOTAL SCORE: 11
7. FEELING AFRAID AS IF SOMETHING AWFUL MIGHT HAPPEN: SEVERAL DAYS
GAD7 TOTAL SCORE: 11
6. BECOMING EASILY ANNOYED OR IRRITABLE: MORE THAN HALF THE DAYS
5. BEING SO RESTLESS THAT IT IS HARD TO SIT STILL: SEVERAL DAYS
7. FEELING AFRAID AS IF SOMETHING AWFUL MIGHT HAPPEN: SEVERAL DAYS
GAD7 TOTAL SCORE: 11
1. FEELING NERVOUS, ANXIOUS, OR ON EDGE: MORE THAN HALF THE DAYS
GAD7 TOTAL SCORE: 11
GAD7 TOTAL SCORE: 11
4. TROUBLE RELAXING: SEVERAL DAYS
5. BEING SO RESTLESS THAT IT IS HARD TO SIT STILL: SEVERAL DAYS
6. BECOMING EASILY ANNOYED OR IRRITABLE: MORE THAN HALF THE DAYS
7. FEELING AFRAID AS IF SOMETHING AWFUL MIGHT HAPPEN: SEVERAL DAYS
2. NOT BEING ABLE TO STOP OR CONTROL WORRYING: MORE THAN HALF THE DAYS
2. NOT BEING ABLE TO STOP OR CONTROL WORRYING: MORE THAN HALF THE DAYS
5. BEING SO RESTLESS THAT IT IS HARD TO SIT STILL: SEVERAL DAYS
GAD7 TOTAL SCORE: 11
GAD7 TOTAL SCORE: 11
2. NOT BEING ABLE TO STOP OR CONTROL WORRYING: MORE THAN HALF THE DAYS
3. WORRYING TOO MUCH ABOUT DIFFERENT THINGS: MORE THAN HALF THE DAYS
4. TROUBLE RELAXING: SEVERAL DAYS
7. FEELING AFRAID AS IF SOMETHING AWFUL MIGHT HAPPEN: SEVERAL DAYS
7. FEELING AFRAID AS IF SOMETHING AWFUL MIGHT HAPPEN: SEVERAL DAYS
2. NOT BEING ABLE TO STOP OR CONTROL WORRYING: MORE THAN HALF THE DAYS
3. WORRYING TOO MUCH ABOUT DIFFERENT THINGS: MORE THAN HALF THE DAYS
3. WORRYING TOO MUCH ABOUT DIFFERENT THINGS: MORE THAN HALF THE DAYS
1. FEELING NERVOUS, ANXIOUS, OR ON EDGE: MORE THAN HALF THE DAYS
7. FEELING AFRAID AS IF SOMETHING AWFUL MIGHT HAPPEN: SEVERAL DAYS
GAD7 TOTAL SCORE: 11
1. FEELING NERVOUS, ANXIOUS, OR ON EDGE: MORE THAN HALF THE DAYS
7. FEELING AFRAID AS IF SOMETHING AWFUL MIGHT HAPPEN: SEVERAL DAYS
GAD7 TOTAL SCORE: 11
4. TROUBLE RELAXING: SEVERAL DAYS
3. WORRYING TOO MUCH ABOUT DIFFERENT THINGS: MORE THAN HALF THE DAYS
5. BEING SO RESTLESS THAT IT IS HARD TO SIT STILL: SEVERAL DAYS
6. BECOMING EASILY ANNOYED OR IRRITABLE: MORE THAN HALF THE DAYS
4. TROUBLE RELAXING: SEVERAL DAYS
7. FEELING AFRAID AS IF SOMETHING AWFUL MIGHT HAPPEN: SEVERAL DAYS

## 2024-06-27 ASSESSMENT — PATIENT HEALTH QUESTIONNAIRE - PHQ9
10. IF YOU CHECKED OFF ANY PROBLEMS, HOW DIFFICULT HAVE THESE PROBLEMS MADE IT FOR YOU TO DO YOUR WORK, TAKE CARE OF THINGS AT HOME, OR GET ALONG WITH OTHER PEOPLE: EXTREMELY DIFFICULT
SUM OF ALL RESPONSES TO PHQ QUESTIONS 1-9: 12
SUM OF ALL RESPONSES TO PHQ QUESTIONS 1-9: 12

## 2024-06-27 NOTE — NURSING NOTE
Is the patient currently in the state of [place]? YES    Visit mode:VIDEO    If the visit is dropped, the patient can be reconnected by: VIDEO VISIT: Send to e-mail at: [email or web address]    Will anyone else be joining the visit? NO  (If patient encounters technical issues they should call [phone])    How would you like to obtain your AVS? MyChart    Are changes needed to the allergy or medication list? Pt stated no changes to allergies and Pt stated no med changes    Are refills needed on medications prescribed by this physician? NO    Reason for visit: TK GARCIAF

## 2024-06-27 NOTE — PROGRESS NOTES
Virtual Visit Details    Type of service:  Video Visit   Video Start Time:  1:06p  Video End Time: 1:37p    Originating Location (pt. Location): Home    Distant Location (provider location):  Off-site  Platform used for Video Visit: Lissette

## 2024-06-27 NOTE — Clinical Note
Zack Fulton!  Winnie will be starting PHP soon and she is wanting to pause the Women's Group for now. I think she is scheduled on 7/9. Sounds like she would like to resume group with you once PHP is complete.  ThanksJeannette
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
## 2024-06-27 NOTE — PATIENT INSTRUCTIONS
Medication Plan  -Continue gabapentin 400mg at bedtime and 100mg Q6H PRN for anxiety.     **For crisis resources, please see the information at the end of this document**   Patient Education    Thank you for coming to the [facility] [place] [facility].     Lab Testing:  If you had lab testing today and your results are reassuring or normal they will be mailed to you or sent through [facility] within 7 days. If the lab tests need quick action we will call you with the results. The phone number we will call with results is # [phone]. If this is not the best number please call our clinic and change the number.     Medication Refills:  If you need any refills please call your pharmacy and they will contact us. Our fax number for refills is [phone].   Three business days of notice are needed for general medication refill requests.   Five business days of notice are needed for controlled substance refill requests.   If you need to change to a different pharmacy, please contact the new pharmacy directly. The new pharmacy will help you get your medications transferred.     Contact Us:  Please call [phone] during business hours (8-5:00 M-F).   If you have medication related questions after clinic hours, or on the weekend, please call [phone].     Financial Assistance [phone]   Medical Records [phone]       MENTAL HEALTH CRISIS RESOURCES:  For a emergency help, please call 911 or go to the nearest Emergency Department.     Emergency Walk-In Options:   [facility] @ [facility] ([place]): [phone] - Specialized mental health emergency area designed to be calming  [facility] ([place]): [phone]  [facility] ([place]): [phone]  [facility]): [phone]    [facility] Crisis Information:   [place]: [phone]  [place]: [phone]  [place] ([place]) - Adult: [phone]     Child:  [phone]  [place] - Adult: [phone]     Child: [phone]  [place]: [phone]  List of all [facility] resources:   https://[place].gov/dhs/people-we-serve/adults/health-care/mental-health/resources/crisis-contacts.jsp    National Crisis Information:   Crisis Text Line: Text  [place]  to 218989  Suicide & Crisis Lifeline: 988  National Suicide Prevention Lifeline: 0-270-438-TALK (1-201.907.7802)       For online chat options, visit https://suicidepreventionlifeline.org/chat/  Poison Control Center: 0-205-470-7922  Trans Lifeline: 1-272.458.6770 - Hotline for transgender people of all ages  [employer]: 3-194-961-2887 - Hotline for LGBT youth     For Non-Emergency Support:   Fast Tracker: Mental Health & Substance Use Disorder Resources -   [email or web address]

## 2024-06-28 ENCOUNTER — ANESTHESIA (OUTPATIENT)
Dept: BEHAVIORAL HEALTH | Facility: CLINIC | Age: 69
End: 2024-06-28
Payer: MEDICARE

## 2024-06-28 ENCOUNTER — HOSPITAL ENCOUNTER (OUTPATIENT)
Dept: BEHAVIORAL HEALTH | Facility: CLINIC | Age: 69
Discharge: HOME | End: 2024-06-28
Attending: PSYCHIATRY & NEUROLOGY
Payer: MEDICARE

## 2024-06-28 ENCOUNTER — ANESTHESIA EVENT (OUTPATIENT)
Dept: BEHAVIORAL HEALTH | Facility: CLINIC | Age: 69
End: 2024-06-28
Payer: MEDICARE

## 2024-06-28 VITALS
DIASTOLIC BLOOD PRESSURE: 84 MMHG | OXYGEN SATURATION: 94 % | RESPIRATION RATE: 17 BRPM | TEMPERATURE: 98.4 F | HEART RATE: 80 BPM | SYSTOLIC BLOOD PRESSURE: 117 MMHG

## 2024-06-28 DIAGNOSIS — F33.2 SEVERE RECURRENT MAJOR DEPRESSION WITHOUT PSYCHOTIC FEATURES (H): Primary | ICD-10-CM

## 2024-06-28 PROCEDURE — 90870 ELECTROCONVULSIVE THERAPY: CPT | Performed by: ANESTHESIOLOGY

## 2024-06-28 PROCEDURE — 250N000009 HC RX 250

## 2024-06-28 PROCEDURE — 90870 ELECTROCONVULSIVE THERAPY: CPT | Performed by: PSYCHIATRY & NEUROLOGY

## 2024-06-28 PROCEDURE — 250N000011 HC RX IP 250 OP 636

## 2024-06-28 PROCEDURE — 90870 ELECTROCONVULSIVE THERAPY: CPT

## 2024-06-28 PROCEDURE — 370N000017 HC ANESTHESIA TECHNICAL FEE, PER MIN: Performed by: ANESTHESIOLOGY

## 2024-06-28 PROCEDURE — 250N000011 HC RX IP 250 OP 636: Performed by: PSYCHIATRY & NEUROLOGY

## 2024-06-28 RX ORDER — OXYCODONE HYDROCHLORIDE 5 MG/1
10 TABLET ORAL
Status: DISCONTINUED | OUTPATIENT
Start: 2024-06-28 | End: 2024-06-29 | Stop reason: HOSPADM

## 2024-06-28 RX ORDER — HYDROMORPHONE HCL IN WATER/PF 6 MG/30 ML
0.2 PATIENT CONTROLLED ANALGESIA SYRINGE INTRAVENOUS EVERY 5 MIN PRN
Status: DISCONTINUED | OUTPATIENT
Start: 2024-06-28 | End: 2024-06-29 | Stop reason: HOSPADM

## 2024-06-28 RX ORDER — FENTANYL CITRATE 50 UG/ML
50 INJECTION, SOLUTION INTRAMUSCULAR; INTRAVENOUS EVERY 5 MIN PRN
Status: DISCONTINUED | OUTPATIENT
Start: 2024-06-28 | End: 2024-06-29 | Stop reason: HOSPADM

## 2024-06-28 RX ORDER — HYDROMORPHONE HCL IN WATER/PF 6 MG/30 ML
0.4 PATIENT CONTROLLED ANALGESIA SYRINGE INTRAVENOUS EVERY 5 MIN PRN
Status: DISCONTINUED | OUTPATIENT
Start: 2024-06-28 | End: 2024-06-29 | Stop reason: HOSPADM

## 2024-06-28 RX ORDER — ONDANSETRON 4 MG/1
4 TABLET, ORALLY DISINTEGRATING ORAL EVERY 30 MIN PRN
Status: DISCONTINUED | OUTPATIENT
Start: 2024-06-28 | End: 2024-06-29 | Stop reason: HOSPADM

## 2024-06-28 RX ORDER — NALOXONE HYDROCHLORIDE 0.4 MG/ML
0.1 INJECTION, SOLUTION INTRAMUSCULAR; INTRAVENOUS; SUBCUTANEOUS
Status: DISCONTINUED | OUTPATIENT
Start: 2024-06-28 | End: 2024-06-29 | Stop reason: HOSPADM

## 2024-06-28 RX ORDER — ONDANSETRON 2 MG/ML
4 INJECTION INTRAMUSCULAR; INTRAVENOUS EVERY 30 MIN PRN
Status: DISCONTINUED | OUTPATIENT
Start: 2024-06-28 | End: 2024-06-29 | Stop reason: HOSPADM

## 2024-06-28 RX ORDER — DEXAMETHASONE SODIUM PHOSPHATE 4 MG/ML
4 INJECTION, SOLUTION INTRA-ARTICULAR; INTRALESIONAL; INTRAMUSCULAR; INTRAVENOUS; SOFT TISSUE
Status: DISCONTINUED | OUTPATIENT
Start: 2024-06-28 | End: 2024-06-29 | Stop reason: HOSPADM

## 2024-06-28 RX ORDER — KETOROLAC TROMETHAMINE 30 MG/ML
30 INJECTION, SOLUTION INTRAMUSCULAR; INTRAVENOUS ONCE
Status: CANCELLED
Start: 2024-06-28 | End: 2024-06-28

## 2024-06-28 RX ORDER — SODIUM CHLORIDE, SODIUM LACTATE, POTASSIUM CHLORIDE, CALCIUM CHLORIDE 600; 310; 30; 20 MG/100ML; MG/100ML; MG/100ML; MG/100ML
INJECTION, SOLUTION INTRAVENOUS CONTINUOUS
Status: DISCONTINUED | OUTPATIENT
Start: 2024-06-28 | End: 2024-06-29 | Stop reason: HOSPADM

## 2024-06-28 RX ORDER — FENTANYL CITRATE 50 UG/ML
25 INJECTION, SOLUTION INTRAMUSCULAR; INTRAVENOUS EVERY 5 MIN PRN
Status: DISCONTINUED | OUTPATIENT
Start: 2024-06-28 | End: 2024-06-29 | Stop reason: HOSPADM

## 2024-06-28 RX ORDER — OXYCODONE HYDROCHLORIDE 5 MG/1
5 TABLET ORAL
Status: DISCONTINUED | OUTPATIENT
Start: 2024-06-28 | End: 2024-06-29 | Stop reason: HOSPADM

## 2024-06-28 RX ORDER — KETOROLAC TROMETHAMINE 30 MG/ML
30 INJECTION, SOLUTION INTRAMUSCULAR; INTRAVENOUS ONCE
Status: COMPLETED | OUTPATIENT
Start: 2024-06-28 | End: 2024-06-28

## 2024-06-28 RX ORDER — LABETALOL HYDROCHLORIDE 5 MG/ML
10 INJECTION, SOLUTION INTRAVENOUS
Status: DISCONTINUED | OUTPATIENT
Start: 2024-06-28 | End: 2024-06-29 | Stop reason: HOSPADM

## 2024-06-28 RX ORDER — LABETALOL HYDROCHLORIDE 5 MG/ML
INJECTION, SOLUTION INTRAVENOUS PRN
Status: DISCONTINUED | OUTPATIENT
Start: 2024-06-28 | End: 2024-06-28

## 2024-06-28 RX ORDER — METHOHEXITAL IN WATER/PF 100MG/10ML
SYRINGE (ML) INTRAVENOUS PRN
Status: DISCONTINUED | OUTPATIENT
Start: 2024-06-28 | End: 2024-06-28

## 2024-06-28 RX ADMIN — SUCCINYLCHOLINE CHLORIDE 90 MG: 20 INJECTION, SOLUTION INTRAMUSCULAR; INTRAVENOUS; PARENTERAL at 11:48

## 2024-06-28 RX ADMIN — KETOROLAC TROMETHAMINE 30 MG: 30 INJECTION, SOLUTION INTRAMUSCULAR at 11:30

## 2024-06-28 RX ADMIN — Medication 100 MG: at 11:47

## 2024-06-28 RX ADMIN — LABETALOL HYDROCHLORIDE 20 MG: 5 INJECTION, SOLUTION INTRAVENOUS at 11:54

## 2024-06-28 ASSESSMENT — COPD QUESTIONNAIRES: COPD: 1

## 2024-06-28 NOTE — ANESTHESIA PREPROCEDURE EVALUATION
Anesthesia Pre-Procedure Evaluation    Patient: Randi Cleary   MRN: [id number] : 1955        Procedure : * No procedures listed *          Past Medical History:   Diagnosis Date    Bipolar 2 disorder (H)     Breast cancer (H)     lumpectomy, radiation, chemo    Chronic pain syndrome     COPD (chronic obstructive pulmonary disease) (H)     asthma    Cord compression (H) 2021    Dizzy     Drug tolerance     opioid    Esophageal reflux     Fatigue     Generalized anxiety disorder     Graves disease     Hemochromatosis 2018    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; H63D not detected    History of breast cancer 2020    Formatting of this note might be different from the original. Created by Conversion  Replacement Utility updated for latest IMO load Formatting of this note might be different from the original. Created by Conversion  Replacement Utility updated for latest IMO load    History of corticosteroid therapy 2019    History of partial adrenalectomy (H24) 2019    History of pheochromocytoma 2019    Hx antineoplastic chemotherapy     Hx of radiation therapy     Hyperlipidaemia     Hypertension     Impaired fasting glucose     Injury of neck, whiplash 07/15/2021    Joint pain     KYAW (obstructive sleep apnea) 2016    Osteopenia     Pheochromocytoma, left 2017    laparoscopically removed    Postablative hypothyroidism 1995    Prediabetes 10/03/2019    by A1c    Psoriasis     Psoriatic arthropathy (H)     Right rotator cuff tear     RLS (restless legs syndrome)     on ropinorole    Sacroiliitis (H24)     Serotonin syndrome 2020    [facility] - While on desvenlafaxine 100mg    Snoring     Spinal stenosis     Status post coronary angiogram 10/03/2019    Urinary incontinence     Vitamin B 12 deficiency 2009    Vitamin D deficiency 2010      Past Surgical History:   Procedure Laterality Date    ARTHRODESIS ANKLE      ARTHROPLASTY ANKLE Right 2015     Procedure: ARTHROPLASTY ANKLE;  Surgeon: Jason Coughlin MD;  Location: [facility]    ARTHROPLASTY REVISION ANKLE Right 6/29/2015    Procedure: ARTHROPLASTY REVISION ANKLE;  Surgeon: Jason Coughlin MD;  Location: [facility]    BIOPSY BREAST      BREAST BIOPSY, CORE RT/LT      COLONOSCOPY      COLONOSCOPY N/A 2/25/2021    Procedure: COLONOSCOPY;  Surgeon: Guru Elke Tolbert MD;  Location:  GI    CV CORONARY ANGIOGRAM N/A 10/3/2019    Procedure: CV CORONARY ANGIOGRAM;  Surgeon: Bryce Pierre MD;  Location:  HEART CARDIAC CATH LAB    CV RIGHT HEART CATH MEASUREMENTS RECORDED N/A 10/3/2019    Procedure: CV RIGHT HEART CATH;  Surgeon: Bryce Pierre MD;  Location:  HEART CARDIAC CATH LAB    ESOPHAGOSCOPY, GASTROSCOPY, DUODENOSCOPY (EGD), COMBINED N/A 2/25/2021    Procedure: ESOPHAGOGASTRODUODENOSCOPY, WITH BIOPSY;  Surgeon: Guru Elke Tolbert MD;  Location:  GI    EYE SURGERY  2021    HC REMOVE TONSILS/ADENOIDS,<13 Y/O      Description: Tonsillectomy With Adenoidectomy;  Recorded: 04/07/2010;    IR LUMBAR EPIDURAL STEROID INJECTION  10/26/2004    IR LUMBAR EPIDURAL STEROID INJECTION  11/16/2004    IR LUMBAR EPIDURAL STEROID INJECTION  12/21/2004    IR LUMBAR EPIDURAL STEROID INJECTION  6/8/2006    JOINT REPLACEMENT      LAMINOPLASTY CERVICAL POSTERIOR THREE+ LEVELS Left 12/21/2021    Procedure: CERVICAL 3-CERVICAL 6 LEFT OPEN DOOR LAMINOPLASTY AND LEFT CERVICAL 4-5 AND CERVICAL 6-7 POSTERIOR FORAMINOTOMY;  Surgeon: Angela Gregory MD;  Location: [facility]    LAPAROSCOPIC ADRENALECTOMY Left 08/02/2017    pheochromocytoma    LAPAROSCOPIC ADRENALECTOMY Left 8/2/2017    Procedure: LAPAROSCOPIC LEFT ADRENALECTOMY, ;  Surgeon: Gab Linares MD;  Location: [facility];  Service:     LENGTHEN TENDON ACHILLES Right 6/29/2015    Procedure: LENGTHEN TENDON ACHILLES;  Surgeon: Jason Coughlin MD;  Location: [facility]    LUMPECTOMY BREAST       "LUMPECTOMY BREAST Left 1994    MAMMOPLASTY REDUCTION Right 2015    [place]    MAMMOPLASTY REDUCTION Right     approx late /    MASTECTOMY      left lumpectomy with axillary node dissection    MASTECTOMY MODIFIED RADICAL      OTHER SURGICAL HISTORY Right     reconstructive breast surgery    OTHER SURGICAL HISTORY      Adrenalectomy for pheochromocytoma    NC MASTECTOMY, MODIFIED RADICAL      Description: Modified Radical Mastectomy Left Breast;  Recorded: 2010;    REPAIR HAMMER TOE Right 2015    Procedure: REPAIR HAMMER TOE;  Surgeon: Jason Coughlin MD;  Location: [facility]    TONSILLECTOMY      TONSILLECTOMY & ADENOIDECTOMY      [email or web address] ARTHRODESIS,ANKLE,OPEN Right     Description: Ankle Arthrodesis;  Recorded: 2010;      Allergies   Allergen Reactions    Serotonin Reuptake Inhibitors (Ssris) Anxiety, Difficulty breathing, Headache, Palpitations and Shortness Of Breath    Buspirone      The patient states she had serotonin syndrome    Cephalexin      Other reaction(s): unknown rxn.    Desvenlafaxine      Serotonin syndrome    Diclofenac Sodium [Diclofenac]      Serotonin syndrome and restless legs syndrome    Gabapentin      Drove on the wrong side of the highway    Levofloxacin      \"CAN'T REMEMBER\"    Penicillins      \"SORES IN MOUTH\"    Riluzole Difficulty breathing and Swelling    Sulfa Antibiotics      \"PT DOES NOT KNOW WHAT THE REACTION WAS\"    Topiramate Other (See Comments)     Frequent urination      Social History     Tobacco Use    Smoking status: Former     Current packs/day: 0.00     Average packs/day: 2.5 packs/day for 29.2 years (72.9 ttl pk-yrs)     Types: Cigarettes     Start date: 1971     Quit date: 2000     Years since quittin.0     Passive exposure: Past    Smokeless tobacco: Never   Substance Use Topics    Alcohol use: Not Currently     Comment: relapse 2021 sober       Wt Readings from Last 1 Encounters:   24 88.4 kg (194 lb 14.4 oz)    "     Anesthesia Evaluation            ROS/MED HX  ENT/Pulmonary:     (+) sleep apnea,                         COPD,              Neurologic:       Cardiovascular:     (+)  hypertension- -   -  - -                                      METS/Exercise Tolerance:     Hematologic:       Musculoskeletal:       GI/Hepatic:     (+) GERD,                   Renal/Genitourinary:       Endo:     (+)  type II DM,        thyroid problem,     Obesity,       Psychiatric/Substance Use:       Infectious Disease:       Malignancy:       Other:            Physical Exam    Airway        Mallampati: III   TM distance: > 3 FB   Neck ROM: full   Mouth opening: > 3 cm    Respiratory Devices and Support         Dental       (+) Modest Abnormalities - crowns, retainers, 1 or 2 missing teeth      Cardiovascular   cardiovascular exam normal       Rhythm and rate: regular     Pulmonary   pulmonary exam normal                OUTSIDE LABS:  CBC:   Lab Results   Component Value Date    WBC 7.9 06/04/2024    WBC 7.1 05/22/2024    HGB 17.0 (H) 06/04/2024    HGB 17.7 (H) 05/22/2024    HCT 48.1 (H) 06/04/2024    HCT 49.9 (H) 05/22/2024     06/04/2024     05/22/2024     BMP:   Lab Results   Component Value Date     06/04/2024     05/22/2024    POTASSIUM 4.3 06/04/2024    POTASSIUM 5.2 06/04/2024    CHLORIDE 103 06/04/2024    CHLORIDE 104 05/22/2024    CO2 21 (L) 06/04/2024    CO2 24 05/22/2024    BUN 17.3 06/04/2024    BUN 9.3 05/22/2024    CR 0.53 06/04/2024    CR 0.57 05/22/2024    GLC 96 06/04/2024     (H) 05/22/2024     COAGS:   Lab Results   Component Value Date    PTT 34 12/13/2021    INR 0.94 12/13/2021     POC:   Lab Results   Component Value Date     (H) 02/25/2021     HEPATIC:   Lab Results   Component Value Date    ALBUMIN 3.7 06/04/2024    PROTTOTAL 7.2 06/04/2024    ALT 33 06/04/2024    AST 35 06/04/2024    ALKPHOS 80 06/04/2024    BILITOTAL 0.4 06/04/2024     OTHER:   Lab Results   Component Value Date     A1C 5.6 05/22/2024    LINDA 9.5 06/04/2024    MAG 1.9 05/22/2024    TSH 1.69 06/04/2024    T4 1.49 03/29/2024    T3 114 01/18/2023    CRP <2.9 11/16/2021    SED 8 10/17/2023       Anesthesia Plan    ASA Status:  3       Anesthesia Type: General.   Induction: Intravenous.           Consents            Postoperative Care            Comments:               Jocelyn Hunter MD    I have reviewed the pertinent notes and labs in the chart from the past 30 days and (re)examined the patient.  Any updates or changes from those notes are reflected in this note.

## 2024-06-28 NOTE — PROCEDURES
"Procedures  [facility], [place]   ECT Procedure Note   06/28/2024    Randi Cleary is a 68 year old female patient.  [id number]    Patient Status: IN-patient    Is this the first in a series of 12 treatments?  No      Allergies   Allergen Reactions    Serotonin Reuptake Inhibitors (Ssris) Anxiety, Difficulty breathing, Headache, Palpitations and Shortness Of Breath    Buspirone      The patient states she had serotonin syndrome    Cephalexin      Other reaction(s): unknown rxn.    Desvenlafaxine      Serotonin syndrome    Diclofenac Sodium [Diclofenac]      Serotonin syndrome and restless legs syndrome    Gabapentin      Drove on the wrong side of the highway    Levofloxacin      \"CAN'T REMEMBER\"    Penicillins      \"SORES IN MOUTH\"    Riluzole Difficulty breathing and Swelling    Sulfa Antibiotics      \"PT DOES NOT KNOW WHAT THE REACTION WAS\"    Topiramate Other (See Comments)     Frequent urination       Weight:  0 lbs 0 oz / 0 kg          Indications for ECT:   Medications ineffective and Psychotherapies ineffective         Clinical Narrative:   HPI - The patient describes a lifelong history of depression dating back to elementary school, worsening after her father's death when she was 15yo and especially in the 1980s in the setting of worsening physical health (since falling and breaking her ankle), which led to the the initiation of fluoxetine, her first antidepressant.  Her history has been primarily characterized by low mood, with some ups and downs but no extended depression-free periods since then.  She has tried many different medications from different classes, but cannot recall any one being particularly helpful for her.  She has experienced past episodes of particularly irritable mood and concomitant decreased sleep need, although most of these have lasted 1-2 days and triggered by anger related to external stressors.  She has at least one episode of a more extended " "episode of elevated mood (and associated grandiosity, increased spending, flight of ideas, increased goal directed behaviors, pressured speech, and odd and embarrassing behavior), which occurred in the context of relapse on alcohol in 2021. She does not endorse any events before about age 48.     The patient's depression is characterized by near daily low mood, frequent anhedonia, with sleep difficulties (although c/b pain, KYAW, and RLS), fatigue, feelings of guilt/worthlessness, and impaired concentration.  She reports feelings of \"despair,\" at times with active SI with plan, but denies any intent to act on this - \"maybe it's hope.\"  She has 1 lifetime suicide attempt (overdose) in the 1980s, for which she was psychiatrically hospitalized.  She has a remote history of SIB (cutting) but not recently.       Psych pertinent item history includes includes suicide attempt , suicidal ideation, SIB , aggression, trauma hx, substance use: alcohol, cannabis, and Patient has a history of alcohol dependence treated 20+ years ago and she relapsed in 2020 for a couple of months, in her 20s she\" loved getting high\" on cocaine, LSD, mushrooms, speed, white cross, mutiple psychotropic trials , psych hosp, ketamine, and major medical problems.    Target Symptoms for Improvement: Amotivation, Fatigue, Improved self-image and Panic attacks         Diagnosis:   Major depression         Assessment:   #1 05/24/24 Some circumstantial thought, needs some redirection, 3.5 hours sleep last night, anxious, mood 1/10, PDW but no SI, never had ECT before - only TMS. No AVH.   #2 05/29/24  Mood 4/10, better over w/e, some word find difficulties, no AVH/SI/PDW. Chronic sciatica pain, neck and shoulder pain - didn't worsen with ECT. Mild headache.   #3 05/31/24  Mood 4/10, No SI, PDW, AVH, some wrist pain and headache, memory might be improving.    #4 6/3/24  Phq-9= 13, mood 3/10.  Yesterday was very tearful, still a bit today.  Last treatment " "had awareness under paralysis.  Otherwise, some initial confusion after first ECT but no subsequent cognitive effects.  Signed consent to continue.  #5 6/5/24 Mood 4-5/10  She feels like her \"rage\" is less and she feels lighter. but no cognitive side effects. She complains of excessive sweating and is concerned her thryoid is unbalanced. She is somatically focused She reports pain on the side of her neck radiating down to her chest. She had a migraine she thinks over the weekend which usual starts as occipital tension.  #6 6/7/24 mood 4-5/10. No SI. She does have some baseline long term memory difficulties no AVH.   #7 6/10/24  She still is worried that She is not able to go home. She had visitors this weekend who said \"you don't seem to have the weight of the world on your shoulders anymore\" she disagrees she had a downward spiral over the weekend when there was a mix up with her clothes and she usually feels tearful after ECT. She does not report anything feeling worse. She gets down on herself when she has an anxiety spiral and it was the 1st one she has had since admission.   #8 6/12/24 Winnie reported some tearfulness overnight. She is noticing a weight lifting mood 6/10 . Reports some difficulty with remembering names but believes this is at baseline.   #9 6/14/24 Mood 5/10 (10 best) She feels like she is improving each time . She still has occasional crying spells but she feels she bounces back quicker. She is still anxious about going home. No Si. Tolerating ECT  #10 06/17/24 mood 5/10 She does feellike she has gotten some improvement since starting Ect but still has times where she gets tearful and anxious \"goes down the rabit hole\" but not as often . She has had ketamine troches before with pain  management to no effect but wonders about ketamine infusions.  #11 06/19/24 Mood today is 5/10. Patient is wondering whether she could do a ketamine course (did have ketamine in the past), despite of being on " "opioids. Feels that ketamine can help with \"anger, tearing and anxiety.\" She complains to the anesthesiologist about recent urinary incontinence which needs to be considered when  using ketamine. She wants to try it for anger ,tearing and anxiety which is not a standard indication  #12 06/21/24 Mood 5/10, no PDW/SI, but some anxiety around pain this AM.  Patient is interested in maintenance ECT at her attending psychiatrist's recommendation to prevent the possibility of her mood dipping as low as it did previously that led to her hospitalization.  Discussed pros and cons of maintenance ECT, suggested alternative of maintenance medications/therapy and/or watchful waiting with possibility of retreatment should she relapse, as well as alternate interventions including ketamine.  At the moment, she is most interested in pursuing maintenance ECT - will plan for next Friday pending confirmation with her family that she has post-ECT ride and monitoring.  M 06/28/24 Mood ups and downs, irritability, anxiety, sadness switching multiple times a day since being discharged home.  Had difficulty with the transition home, was harder than she thought it would be.  However, still able to \"push away\" PDW/SI, and did not have periods of persistently low mood this past week.  Has noticed some anterograde and retrograde amnesia of the 1-2 months prior to starting ECT.  Will continue to track.  Today, mood 6/10 \"now that I'm at ECT.\"           Pause for the Cause:     Correct patient Yes   Correct procedure/laterality settings: Yes           Intra-Procedure Documentation:     ECT #: 13   Treatment number this series: 13   Total treatment number: 13     Type of ECT:  Right, unilateral ultrabrief    ECT Medications:    Toradol 30mg iv - for headache/myalgia     Brevital: 100 mg (incr from 90 mg as still awake)   Succinyl Choline: 90 mg    BP - nicardipine 1500 mcg         ECT Strip Summary: RUL Titration #3: 38.4 mC   Energy Level:  " 384.0mC, 0.3 ms, 100 Hz, 8 sec, 800 mA     Motor Seizure Duration: 23  seconds  EEG Seizure Duration: 40 seconds      Complications: none  Plan:   - Plan for maintenance Q1wk  - Monitor depression severity with clinical assessment augmented with PHQ9 every other treatment  - Continue current medications    Discharge instructions:    -- Remember to NOT take gabapentin after 6pm the night before each treatment  -- You will have to check to make sure somebody can drive you to and from the hospital and monitor you for 6 hours after each treatment  -- Maintenance ECT starts weekly for several weeks, then goes down to every two weeks, then to once every month.  The goal of maintenance ECT is to space out and eventually stop  -- You cannot drive for the next week.  Then, during maintenance, you can't drive for 24 hours after each treatment.  - The 55+ intensive outpatient program is an excellent idea, and I think will be more likely to help the daily ups and downs of your mood that are still present  -   - We discussed other alternatives including trying ketamine through the [facility] or staying on your regular medications and therapy, but at the moment you were most interested in pursuing maintenance    - Per Dr Varela:   - Consider trial of serotonin modulator (e.g., vilazodone vs. vortioxetine) or novel agent Auvelity  - Consider augmentation with atypical antipsychotic (e.g., quetiapine or aripiprazole), though there are concerns given pre-diabetes      Toan Cotter MD  Dept of Psychiatry

## 2024-06-28 NOTE — ANESTHESIA CARE TRANSFER NOTE
Patient: Randi Cleary    Procedure: * No procedures listed *  Electroconvulsive Therapy    Diagnosis: * No pre-op diagnosis entered *  Diagnosis Additional Information: No value filed.    Anesthesia Type:   General     Note:    Oropharynx: oropharynx clear of all foreign objects and spontaneously breathing  Level of Consciousness: drowsy  Oxygen Supplementation: room air    Independent Airway: airway patency satisfactory and stable  Dentition: dentition unchanged  Vital Signs Stable: post-procedure vital signs reviewed and stable  Report to RN Given: handoff report given  Patient transferred to: PACU    Handoff Report: Identifed the Patient, Identified the Reponsible Provider, Reviewed the pertinent medical history, Discussed the surgical course, Reviewed Intra-OP anesthesia mangement and issues during anesthesia, Set expectations for post-procedure period and Allowed opportunity for questions and acknowledgement of understanding  Vitals:  Vitals Value Taken Time   /62 06/28/24 1157   Temp 36.6  C (97.8  F) 06/28/24 1157   Pulse 76 06/28/24 1157   Resp 20 06/28/24 1157   SpO2 92 % 06/28/24 1157       Electronically Signed By: Jocelyn Hunter MD  June 28, 2024  12:06 PM

## 2024-06-28 NOTE — DISCHARGE INSTRUCTIONS
ECT Discharge Instructions      During your ECT series:    Do not drive or work heavy equipment until 7 days after your last treatment.  Do not drink alcohol or use street drugs (illicit drugs) while you are having treatments.  Do not make important decisions, including legal decisions.    After your maintenance ECT treatment:    Do not drive for 24 hours after treatment.  If you will be having more than one treatment witihin a week, no driving until 7 days after your last ECT treatment.         After each treatment:    Get plenty of rest. A responsible adult must stay with your for at least 6 hours.  Avoid heavy or risky activities for 24 hours.  If you feel light-headed, sit for a few minutes before standing. Have someone help you get up.  If you have nausea (feel sick to your stomach): Drink only clear liquids such as apple juice, ginger ale, broth or 7UP, Be sure to drink plenty of liquids. Move to a normal diet as you feel able.   If you received Toradol, wait 6 hours before taking ibuprofen.  Call your doctor if:   You have a fever over 100F (37.7 C) (taken under the tongue), or a fever that last more than 24 hours.  Your IV site is red, swollen, very painful or is getting more tender.  You have nausea that gets very bad or does not improve.    If you have any symptoms after ECT, tell our staff before your next treatment.  The ECT Department can be reached at [phone].  The ECT Department is open Mondays, Wednesdays and Fridays from 7:00 AM to 2:00 PM.    To speak to a doctor, call: [facility] psychiatry Dr. Hutchison and Dr. Cotter [phone] fax [phone]     New instructions:Discharge instructions:               -- Remember to NOT take gabapentin after 6pm the night before each treatment  -- You will have to check to make sure somebody can drive you to and from the hospital and monitor you for 6 hours after each treatment  -- Maintenance ECT starts weekly for several weeks, then goes down to  every two weeks, then to once every month.  The goal of maintenance ECT is to space out and eventually stop  -- You cannot drive for the next week.  Then, during maintenance, you can't drive for 24 hours after each treatment.  - The 55+ intensive outpatient program is an excellent idea, and I think will be more likely to help the daily ups and downs of your mood that are still present    Other: You have received Toradol today at 11:30 AM. Toradol is an NSAID.  Do not take any NSAID's including: Ibuprofen, Naproxen Sodium, Asprin , Advil, Motrin, Aleve, Shannan, etc., until six hours after the above time which would be 5:30 PM. If you have any questions check back with your Physician, or pharmacist.     Transported by: Sidney    Discharge instructions given to Sidney    Instructions for your next appointment:  Covid-19 Testing Instructions: COVID testing is no longer required for your ECT procedures.    Please come to the [facility] entrance and check-in with Security before your ECT appointment.  The [facility] entrance is located right next to the Adult Emergency Room.  The address is [address] [place] [address].    After your appointment, you may be picked up at the [facility] entrance, which is located on the West side of our campus.  The address is [address] [place] [address].  [facility].

## 2024-06-28 NOTE — ANESTHESIA POSTPROCEDURE EVALUATION
Patient: Randi Cleary    Procedure: * No procedures listed *  Electroconvulsive Therapy    Anesthesia Type:  General    Note:  Disposition: Outpatient   Postop Pain Control: Uneventful            Sign Out: Well controlled pain   PONV: No   Neuro/Psych: Uneventful            Sign Out: Acceptable/Baseline neuro status   Airway/Respiratory: Uneventful            Sign Out: Acceptable/Baseline resp. status   CV/Hemodynamics: Uneventful            Sign Out: Acceptable CV status; No obvious hypovolemia; No obvious fluid overload   Other NRE: NONE   DID A NON-ROUTINE EVENT OCCUR? No       Last vitals:  Vitals:    06/28/24 1157 06/28/24 1215 06/28/24 1225   BP: 109/62 (!) 124/90 117/84   Pulse: 76 85 80   Resp: 20 16 17   Temp: 36.6  C (97.8  F) 36.9  C (98.5  F) 36.9  C (98.4  F)   SpO2: 92% 93% 94%       Electronically Signed By: Jocelyn Hunter MD  June 28, 2024  12:28 PM

## 2024-07-01 ENCOUNTER — HOSPITAL ENCOUNTER (OUTPATIENT)
Dept: BEHAVIORAL HEALTH | Facility: CLINIC | Age: 69
Discharge: HOME | End: 2024-07-01
Attending: PSYCHIATRY & NEUROLOGY
Payer: MEDICARE

## 2024-07-01 PROBLEM — F33.2 MDD (MAJOR DEPRESSIVE DISORDER), RECURRENT EPISODE, SEVERE (H): Status: ACTIVE | Noted: 2024-07-01

## 2024-07-01 PROCEDURE — 90853 GROUP PSYCHOTHERAPY: CPT

## 2024-07-01 PROCEDURE — 90853 GROUP PSYCHOTHERAPY: CPT | Performed by: SOCIAL WORKER

## 2024-07-01 ASSESSMENT — ANXIETY QUESTIONNAIRES
1. FEELING NERVOUS, ANXIOUS, OR ON EDGE: MORE THAN HALF THE DAYS
IF YOU CHECKED OFF ANY PROBLEMS ON THIS QUESTIONNAIRE, HOW DIFFICULT HAVE THESE PROBLEMS MADE IT FOR YOU TO DO YOUR WORK, TAKE CARE OF THINGS AT HOME, OR GET ALONG WITH OTHER PEOPLE: VERY DIFFICULT
GAD7 TOTAL SCORE: 11
3. WORRYING TOO MUCH ABOUT DIFFERENT THINGS: SEVERAL DAYS
GAD7 TOTAL SCORE: 11
7. FEELING AFRAID AS IF SOMETHING AWFUL MIGHT HAPPEN: SEVERAL DAYS
6. BECOMING EASILY ANNOYED OR IRRITABLE: MORE THAN HALF THE DAYS
2. NOT BEING ABLE TO STOP OR CONTROL WORRYING: MORE THAN HALF THE DAYS
5. BEING SO RESTLESS THAT IT IS HARD TO SIT STILL: SEVERAL DAYS

## 2024-07-01 ASSESSMENT — COLUMBIA-SUICIDE SEVERITY RATING SCALE - C-SSRS
1. SINCE LAST CONTACT, HAVE YOU WISHED YOU WERE DEAD OR WISHED YOU COULD GO TO SLEEP AND NOT WAKE UP?: YES
BASED ON RESPONSES TO C-SSRS QS 1-6, WHAT IS THE PATIENT'S OVERALL RISK RATING FOR SUICIDE: LOW RISK
2. HAVE YOU ACTUALLY HAD ANY THOUGHTS OF KILLING YOURSELF?: YES

## 2024-07-01 ASSESSMENT — PATIENT HEALTH QUESTIONNAIRE - PHQ9: 5. POOR APPETITE OR OVEREATING: MORE THAN HALF THE DAYS

## 2024-07-01 NOTE — GROUP NOTE
Psychotherapy Group Note    PATIENT'S NAME: Randi Cleary  MRN:   [id number]  :   1955  ACCT. NUMBER: [id number]  DATE OF SERVICE: 24  START TIME:  2:00 PM  END TIME:  2:50 PM  FACILITATOR: Luh Hare LICSW  TOPIC: MH EBP Group: Relationship Skills  [facility]  TRACK: IOP/DT 3 55+    NUMBER OF PARTICIPANTS: 5    Summary of Group / Topics Discussed:  Relationship Skills: Assertive Communication/MILES: Patients were provided with a general overview of assertive communication skills and how practicing assertive communication skills will assist patients in developing healthier and more effective relationships. Patients reviewed their current awareness on ability to practice assertive communication, ways to increase assertive communication, and identified/problem solved barriers to assertive communication.     Patient Session Goals / Objectives:  Discussed and applied MILES (DBT) strategies.  Identified and discussed patient individual challenges with communication  Presented and practiced effective communication skills in session  Assisted patients in implementing more effective communication skills in their relationships      Patient Participation / Response:  Moderately participated, sharing some personal reflections / insights and adequately adequately received / provided feedback with other participants.    Demonstrated understanding of topics discussed through group discussion and participation, Demonstrated understanding of relationship skills and communication skills, Identified / Expressed personal readiness to incorporate effective communication skills, Verbalized understanding of communication skills, communication challenges, and communication strengths, and Identified plan to address barriers to practicing relationship skills     Treatment Plan:  Patient has a current master individualized treatment plan.  See Epic treatment plan for more  information.    Luh Hare, LICSW

## 2024-07-01 NOTE — GROUP NOTE
Psychotherapy Group Note    PATIENT'S NAME: Randi Cleary  MRN:   [id number]  :   1955  ACCT. NUMBER: [id number]  DATE OF SERVICE: 24  START TIME: 12:00 PM  END TIME: 12:50 PM  FACILITATOR: Machelle Bautista LICSW; Roland Das MD  TOPIC: MH EBP Group: Specialty Awareness  [facility]  TRACK: Admission track    NUMBER OF PARTICIPANTS: 3    Summary of Group / Topics Discussed:  Specialty Topics: Goal Setting: Provided education and assessment on patient's group programming goals. Evaluated patient's assessment of their ability to participate in groups, share emotions with group members, and openness to the group format. Provided education regarding appropriate individual-based group therapy goals.     Patient Session Goals and Objectives:  -Participate in reflective assessment of group readiness.  -Understand appropriate topics and methods to discuss those topics in group programming.  -Identify and problem solve barriers to group participation.  -Identify strategies to actively cope while engaging in group programming.   -Reflected up individualized treatment plans  -Meet with members of the treatment team to assess goal progress       Patient Participation / Response:  Fully participated with the group by sharing personal reflections / insights and openly received / provided feedback with other participants.    Demonstrated understanding of topics discussed through group discussion and participation, Identified / Expressed readiness to act on skill suggestions discussed in topic, and Verbalized understanding of ways to proactively manage illness    Treatment Plan:  Patient has a current master individualized treatment plan and today was our weekly review of the patient's progress.  See Epic treatment plan for progress / updates on goals and plan.    ASHKAN Gr

## 2024-07-01 NOTE — GROUP NOTE
Psychoeducation Group Note    PATIENT'S NAME: Randi Cleary  MRN:   [id number]  :   1955  ACCT. NUMBER: [id number]  DATE OF SERVICE: 24  START TIME:  3:00 PM  END TIME:  3:50 PM  FACILITATOR: Brittani Molina LICSW; Helen Davidson RN  TOPIC:  Wellness Group: [facility]  TRACK: IOP/DT 3    NUMBER OF PARTICIPANTS: 5    Summary of Group / Topics Discussed:  Foundations  Health: Nutrition: Macronutrients: Patient were provided education on major macronutrients, their role in the body, and why it is important to meet daily nutritional needs. Obstacles to meeting daily nutritional needs were identified in group discussion and strategies to promote improved nutrition were explored. Macronutrients discussed include: carbohydrates, proteins and amino acids, fats, fiber, and water.     Patient Session Goals / Objectives:  Discussed the role of diet on mood, physical health, energy level, and the development of chronic disease.  Identified daily nutritional needs recommended by the [employer] via [employer] education resources  Developing increased health literacy skills in finding credible nutrition information from reliable sources      Patient Participation / Response:  Fully participated with the group by sharing personal reflections / insights and openly received / provided feedback with other participants.    Demonstrated understanding of topics discussed through group discussion and participation and Verbalized understanding of foundations of health topic    Treatment Plan:  Patient has a current master individualized treatment plan.  See Epic treatment plan for more information.    ASHKAN Palacios

## 2024-07-01 NOTE — PROGRESS NOTES
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 steps to minimize risk: address pain.  Safety Plan placed in Pt Instructions: Yes.  Rate SI-desire: 1/5.    TREATMENT RISK STATEMENT:  The risks, benefits, alternatives and potential adverse   effects have been discussed and are understood by the pt. The pt understands the risks of   using street drugs or alcohol. There are no medical contraindications, the pt agrees to   treatment with the ability to do so. The pt knows to call the clinic for any problems or to   access emergency care if needed.  Medical and substance use concerns are documented   above.  Psychotropic drug interaction check was done, including changes made today.      Katarina \"Genoveva\" MD [place]anya  PGY 2, Psychiatry Resident       ATTENDING PHYSICIAN:  Arnaldo Varela MD    Physician Attestation   I, Arnaldo Varela MD, saw this patient and agree with the findings and plan of care as documented in the note.      Items personally reviewed/procedural attestation: I was present for and supervised the entire  procedure.    Arnaldo Varela MD    On day of service, time spent was    10 min on chart review  30 min with patient  10 min on note writing and follow up messages       "

## 2024-07-02 ENCOUNTER — OFFICE VISIT (OUTPATIENT)
Dept: PSYCHIATRY | Facility: CLINIC | Age: 69
End: 2024-07-02
Payer: MEDICARE

## 2024-07-02 ENCOUNTER — TELEPHONE (OUTPATIENT)
Dept: PSYCHIATRY | Facility: CLINIC | Age: 69
End: 2024-07-02

## 2024-07-02 VITALS
WEIGHT: 194 LBS | TEMPERATURE: 97.8 F | HEART RATE: 97 BPM | DIASTOLIC BLOOD PRESSURE: 83 MMHG | SYSTOLIC BLOOD PRESSURE: 150 MMHG | BODY MASS INDEX: 31.18 KG/M2 | HEIGHT: 66 IN

## 2024-07-02 DIAGNOSIS — F33.2 SEVERE EPISODE OF RECURRENT MAJOR DEPRESSIVE DISORDER, WITHOUT PSYCHOTIC FEATURES (H): Primary | ICD-10-CM

## 2024-07-02 DIAGNOSIS — E11.9 TYPE 2 DIABETES MELLITUS WITHOUT COMPLICATION, WITHOUT LONG-TERM CURRENT USE OF INSULIN (H): ICD-10-CM

## 2024-07-02 DIAGNOSIS — F41.1 GAD (GENERALIZED ANXIETY DISORDER): ICD-10-CM

## 2024-07-02 NOTE — PATIENT INSTRUCTIONS
"Today's Recommendations:  - Your job right now is to retain the benefts from ECT. That is done by  1) Continuing maintenance ECT  2) Continuing psychotherapy via the 55+ program you are in  3) Starting on a baseline antidepressant to sustain your brain's response in between ECTs. You felt that you were most likely to want to try Auvelity because it is newer, though we both think Trintellix could be a good option for assisting with cognitive side effects of ECT.    If we get past these, we will explore the possibility of using ketamine to perhaps get you less ECT. Vagus nerve stimulation would be even better but is right now not paid for by Medicare.      We are specifically a university and research clinic, and there are often research studies ongoing. Some of those are clinical trials of new brain stimulation treatments. Others are what we would call \"biomarker\" studies, where we ask you to participate in some kind of measurement while you are undergoing regular treatment.   If you are interested in hearing about research consider signing up for our research registry. This will allow researchers in our clinic to reach out if you become eligible for a study. Sign up today at [email or web address]_Registry    In some cases, a clinical trial is the only way to get access to an advanced treatment that is not covered by your insurance. Usually, we will talk about this in your visit if it is an option.      Patient Education       General Information:  Our Treatment-Resistant Disorders/Interventional Psychiatry clinic is what is often called a \"consultation\" or \"tertiary care\" clinic. That means we do not see patients long-term for medication management or talk therapy. We offer thorough evaluations, recommendations about medications/therapies you may have not yet tried, and in some cases, brain stimulation or office-based treatments. If you are likely to benefit from one of those advanced treatments, we will have " talked about it today. Once that treatment is complete, we will see you once or twice afterwards to check in, and then you will return to getting ongoing psychiatric care from whomever you were seeing before you came for your evaluation with us. If for some reason you no longer have access to that clinician, we can help with a referral to our main [facility]. The only patients we see long-term are patients with implanted medical devices that require ongoing monitoring and programming.     Our recommendations almost always include some kind of cognitive-behavioral therapy (CBT) if you are not getting it already. Brain and nerve stimulation treatments work best when combined with certain talk therapies. We make these recommendations because we strongly believe that, without the therapy piece, most people will not get better, or will have limited clinical benefit. It is often difficult or inconvenient to add therapy to an already busy schedule, but it is also necessary.    It is important to remember that, like all mental health treatments, our interventional therapies are not perfect. About one third of people will not feel better after treatment, and even when they work, they do not take away the symptoms entirely.If we have recommended something above, it means we think there is a better than even chance of it working, but things are never guaranteed. This is another reason we usually recommend CBT along with our advanced treatments -- it can address the symptoms that remain after the stimulation/ketamine treatment.       While we are waiting to implement the recommendations you and I have discussed, you should know what to do if your symptoms worsen. A variety of crisis numbers/resources are available. They include:    CRISIS GENERAL NUMBERS   4-760-OWIOEBU ([phone])  OR  [phone]     CRISIS INTERVENTION TEAM (CIT) - this is a POLICE UNIT, specially trained.  This website has information for all of  [place]'s CITs. Lays out which areas have this team.  Http://cit.[email or web address]/citmap/[place].php  However, one can just call 911 and ask for this special team.   If police need to be called, DO ask for this team.    CRISIS MOBILE TEAMS  [and see end of this phrase*]  [facility] 24hrs/7days:    [phone]  (Adults > 17yo)    [phone]  (Child   < 16yo)                                       FRONT DOOR (during the day could call)   [phone]    [facility] -[phone] (Adult)  [phone] (Child)     [facility] -[phone] (Adult and Child)     [facility] -[phone] (0-6.com mobile crisis team; Adult and Child; 24hr)     [facility] -[phone] (Adult and Child)     CRISIS HOUSING  [facility]                           [address]              [phone]  [facility]                                [address]                       [phone]  [facility]                               [address]     [phone]   [facility] Crisis Residence  [address]                [phone]    [place] EMERGENCY NUMBERS    Crisis Connection:                                [phone]  [facility]:     [phone]  Crisis Intervention:                                [phone] or [phone]   COPE: Mobile Team 24hrs/7days:    [phone]  (Adults > 17yo)                                                                            [phone]  (Child   < 16yo)  [facility]        [phone] 24/7 line and Mobile Team   [address] [place] [address]  DROP IN:  M-F: 8:00 am - 7:00 pm  Sat: 11:00 am - 3:00 pm   Sun: Closed     WALK-IN COUNSELING:  [facility]       [phone]    [address]:   M, W, F:  1:00-3:00PM     "M-Th:  6:30-8:30PM    TRANS and LGBT  Call Trans Lifeline at [phone]. This service is staffed by trans people .   LGBT youth <24 contemplating suicide, call the [employer] Lifeline [phone].    POISON CONTROL CENTER  [phone]    OR  go to nearest ER    CHILD  \"[facility] has a needs assessment team who will do an intake evaluation. Based on the results of the intake they will recommended inpatient admission, partial hospitalization, intensive outpatient or outpatient care. The number is [phone]. \"    CRISIS TEXT LINE  Http://www.crisistextline.org  FREE SUPPORT AT YOUR FINGERTIPS,   Crisis Text Line serves anyone, in any type of crisis, providing access to free,  support and information via the medium people already use and trust: text. Here s how it works:  1)  Text [phone] from anywhere in the [place], anytime, about any type of crisis.  2)  A live, trained Crisis Counselor receives the text and responds quickly.      The volunteer Crisis Counselor will help you move from a 'hot moment to a cool moment'    [facility] EMERGENCY NUMBERS    Crisis Connection:                                [phone]  [facility]:              [phone]  Crisis Intervention:                                [phone] or [phone]   COPE: Mobile Team 24hrs/7days:    [phone]  (Adults > 17yo)                                                                            [phone]  (Child   < 16yo)  [facility]        [phone]  CALL 24 hours a day.  [address] [place] [address]  DROP IN:  M-F: 8:00 am - 7:00 pm  Sat: 11:00 am - 3:00 pm   Sun: Closed    WALK-IN COUNSELIN)  [facility]                                  [phone]        [facility] [address] [place] [address] Tiana:                  BUDDY, W:      5:00-7:00PM       2)  [facility]                            [phone]        [place], [address] [facility] [address] [facility]                " "T, Th:      6:00-8:00PM    TRANS and LGBT  Call Trans Vitals (vitals.com)line at [phone]. This service is staffed by trans people 24/7.   LGBT youth <24 contemplating suicide, call the [employer] Lifeline 0-994-9837.    POISON CONTROL CENTER  [phone]    OR  go to nearest ER    CHILD  \"[facility] has a needs assessment team who will do an intake evaluation. Based on the results of the intake they will recommended inpatient admission, partial hospitalization, intensive outpatient or outpatient care. The number is [phone] or [phone]. \"    CRISIS TEXT LINE  Http://www.crisistextline.org  FREE SUPPORT AT YOUR FINGERTIPS, 24/7  Crisis Text Line serves anyone, in any type of crisis, providing access to free, 24/7 support and information via the medium people already use and trust: text. Here s how it works:  1)  Text [phone] from anywhere in the [place], anytime, about any type of crisis.  2)  A live, trained Crisis Counselor receives the text and responds quickly.      The volunteer Crisis Counselor will help you move from a 'hot moment to a cool moment'      * A Community Paramedic (CP) is an advanced paramedic that works to increase access to primary and preventive care and decrease use of emergency departments, which in turn decreases health care costs. Among other things, CPs may play a key role in providing follow-up services after a hospital discharge to prevent hospital readmission. CPs can provide health assessments, chronic disease monitoring and education, medication management, immunizations and vaccinations, laboratory specimen collection, hospital discharge follow-up care and minor medical procedures. CPs work under the direction of an Ambulance Medical Director.   "

## 2024-07-03 ENCOUNTER — TELEPHONE (OUTPATIENT)
Dept: CARDIOLOGY | Facility: CLINIC | Age: 69
End: 2024-07-03
Payer: MEDICARE

## 2024-07-03 ENCOUNTER — TELEPHONE (OUTPATIENT)
Dept: BEHAVIORAL HEALTH | Facility: CLINIC | Age: 69
End: 2024-07-03
Payer: MEDICARE

## 2024-07-03 ENCOUNTER — TELEPHONE (OUTPATIENT)
Dept: GASTROENTEROLOGY | Facility: CLINIC | Age: 69
End: 2024-07-03
Payer: MEDICARE

## 2024-07-03 DIAGNOSIS — I27.20 PULMONARY HYPERTENSION (H): Primary | ICD-10-CM

## 2024-07-03 DIAGNOSIS — R06.02 SOB (SHORTNESS OF BREATH): ICD-10-CM

## 2024-07-03 NOTE — TELEPHONE ENCOUNTER
Cath Lab Case Request/Order    Location: [address] [address] [facility] Waiting Room    Procedure: Right Heart Cath (RHC)    Procedure Date: 7/30    Patient Arrival Time: 1030    Procedure Time: 5th case to follow    Ordering Provider: Dr. Francisco J Edwards    Performing Cardiologist: KATHRINE    Inpatient Bed Needed: No    Post-  Procedure RAKAN appointment scheduled (1 - 2 weeks): YES     Date: 8/6     Provider: Grace    Communicated Patient Instructions:     NPO, nothing to eat 8 hours and drink 2 hours before arrival time: No     , need to arrange a ride home - unable to drive post- procedure: N/A, RHC     Adult at home, need a responsible adult to stay with patient 24 hours post- procedure: N/A, RHC    Appointment was scheduled: Over the phone    Patient expressed understanding of above instructions and denied further questions at this time.    Siobhan Johnson

## 2024-07-03 NOTE — TELEPHONE ENCOUNTER
Message sent to cardiologist,  regarding pt's cardiac catheterization scheduled on 7/30 and whether okay to proceed with colonoscopy on 7/16.   Cinda Barrett RN

## 2024-07-03 NOTE — TELEPHONE ENCOUNTER
----- Message from Michelle Conley sent at 7/3/2024 12:10 PM CDT -----  Regarding: Appt request  PT cancelled today's appt via [facility]. Please add pt back to [facility] for today for charting purposes.    Day: 7/3/24  Track: IOP/DT3     Thank you!

## 2024-07-04 ENCOUNTER — MYC MEDICAL ADVICE (OUTPATIENT)
Dept: PSYCHIATRY | Facility: CLINIC | Age: 69
End: 2024-07-04
Payer: MEDICARE

## 2024-07-04 DIAGNOSIS — F11.90 CHRONIC, CONTINUOUS USE OF OPIOIDS: ICD-10-CM

## 2024-07-04 DIAGNOSIS — R20.0 NUMBNESS IN BOTH HANDS: ICD-10-CM

## 2024-07-04 DIAGNOSIS — G47.00 PERSISTENT INSOMNIA: ICD-10-CM

## 2024-07-04 DIAGNOSIS — M15.0 PRIMARY OSTEOARTHRITIS INVOLVING MULTIPLE JOINTS: ICD-10-CM

## 2024-07-05 ENCOUNTER — ANESTHESIA EVENT (OUTPATIENT)
Dept: BEHAVIORAL HEALTH | Facility: CLINIC | Age: 69
End: 2024-07-05
Payer: MEDICARE

## 2024-07-05 ENCOUNTER — TELEPHONE (OUTPATIENT)
Dept: GASTROENTEROLOGY | Facility: CLINIC | Age: 69
End: 2024-07-05
Payer: MEDICARE

## 2024-07-05 ENCOUNTER — HOSPITAL ENCOUNTER (OUTPATIENT)
Dept: BEHAVIORAL HEALTH | Facility: CLINIC | Age: 69
Discharge: HOME | End: 2024-07-05
Attending: PSYCHIATRY & NEUROLOGY
Payer: MEDICARE

## 2024-07-05 ENCOUNTER — ANESTHESIA (OUTPATIENT)
Dept: BEHAVIORAL HEALTH | Facility: CLINIC | Age: 69
End: 2024-07-05
Payer: MEDICARE

## 2024-07-05 VITALS
HEART RATE: 75 BPM | SYSTOLIC BLOOD PRESSURE: 144 MMHG | DIASTOLIC BLOOD PRESSURE: 88 MMHG | TEMPERATURE: 98.5 F | RESPIRATION RATE: 13 BRPM | OXYGEN SATURATION: 92 %

## 2024-07-05 DIAGNOSIS — F33.2 SEVERE RECURRENT MAJOR DEPRESSION WITHOUT PSYCHOTIC FEATURES (H): Primary | ICD-10-CM

## 2024-07-05 PROCEDURE — 250N000009 HC RX 250: Performed by: STUDENT IN AN ORGANIZED HEALTH CARE EDUCATION/TRAINING PROGRAM

## 2024-07-05 PROCEDURE — 250N000011 HC RX IP 250 OP 636: Performed by: STUDENT IN AN ORGANIZED HEALTH CARE EDUCATION/TRAINING PROGRAM

## 2024-07-05 PROCEDURE — 250N000011 HC RX IP 250 OP 636: Performed by: PSYCHIATRY & NEUROLOGY

## 2024-07-05 PROCEDURE — 370N000017 HC ANESTHESIA TECHNICAL FEE, PER MIN: Performed by: STUDENT IN AN ORGANIZED HEALTH CARE EDUCATION/TRAINING PROGRAM

## 2024-07-05 PROCEDURE — 90870 ELECTROCONVULSIVE THERAPY: CPT

## 2024-07-05 PROCEDURE — 90870 ELECTROCONVULSIVE THERAPY: CPT | Performed by: PSYCHIATRY & NEUROLOGY

## 2024-07-05 PROCEDURE — 90870 ELECTROCONVULSIVE THERAPY: CPT | Performed by: STUDENT IN AN ORGANIZED HEALTH CARE EDUCATION/TRAINING PROGRAM

## 2024-07-05 PROCEDURE — 90870 ELECTROCONVULSIVE THERAPY: CPT | Performed by: NURSE ANESTHETIST, CERTIFIED REGISTERED

## 2024-07-05 RX ORDER — KETOROLAC TROMETHAMINE 30 MG/ML
30 INJECTION, SOLUTION INTRAMUSCULAR; INTRAVENOUS ONCE
Status: COMPLETED | OUTPATIENT
Start: 2024-07-05 | End: 2024-07-05

## 2024-07-05 RX ORDER — ESMOLOL HYDROCHLORIDE 10 MG/ML
INJECTION INTRAVENOUS PRN
Status: DISCONTINUED | OUTPATIENT
Start: 2024-07-05 | End: 2024-07-05

## 2024-07-05 RX ORDER — SODIUM CHLORIDE, SODIUM LACTATE, POTASSIUM CHLORIDE, CALCIUM CHLORIDE 600; 310; 30; 20 MG/100ML; MG/100ML; MG/100ML; MG/100ML
INJECTION, SOLUTION INTRAVENOUS CONTINUOUS
Status: CANCELLED | OUTPATIENT
Start: 2024-07-05

## 2024-07-05 RX ORDER — NALOXONE HYDROCHLORIDE 0.4 MG/ML
0.1 INJECTION, SOLUTION INTRAMUSCULAR; INTRAVENOUS; SUBCUTANEOUS
Status: CANCELLED | OUTPATIENT
Start: 2024-07-05

## 2024-07-05 RX ORDER — ACETAMINOPHEN 325 MG/1
975 TABLET ORAL ONCE
Status: CANCELLED | OUTPATIENT
Start: 2024-07-05 | End: 2024-07-05

## 2024-07-05 RX ORDER — KETOROLAC TROMETHAMINE 30 MG/ML
30 INJECTION, SOLUTION INTRAMUSCULAR; INTRAVENOUS ONCE
Status: CANCELLED
Start: 2024-07-05 | End: 2024-07-05

## 2024-07-05 RX ORDER — NICARDIPINE HCL-0.9% SOD CHLOR 1 MG/10 ML
SYRINGE (ML) INTRAVENOUS PRN
Status: DISCONTINUED | OUTPATIENT
Start: 2024-07-05 | End: 2024-07-05

## 2024-07-05 RX ORDER — ONDANSETRON 4 MG/1
4 TABLET, ORALLY DISINTEGRATING ORAL EVERY 30 MIN PRN
Status: CANCELLED | OUTPATIENT
Start: 2024-07-05

## 2024-07-05 RX ORDER — LABETALOL HYDROCHLORIDE 5 MG/ML
INJECTION, SOLUTION INTRAVENOUS PRN
Status: DISCONTINUED | OUTPATIENT
Start: 2024-07-05 | End: 2024-07-05

## 2024-07-05 RX ORDER — ONDANSETRON 2 MG/ML
4 INJECTION INTRAMUSCULAR; INTRAVENOUS EVERY 30 MIN PRN
Status: CANCELLED | OUTPATIENT
Start: 2024-07-05

## 2024-07-05 RX ORDER — METHOHEXITAL IN WATER/PF 100MG/10ML
SYRINGE (ML) INTRAVENOUS PRN
Status: DISCONTINUED | OUTPATIENT
Start: 2024-07-05 | End: 2024-07-05

## 2024-07-05 RX ORDER — DEXAMETHASONE SODIUM PHOSPHATE 4 MG/ML
4 INJECTION, SOLUTION INTRA-ARTICULAR; INTRALESIONAL; INTRAMUSCULAR; INTRAVENOUS; SOFT TISSUE
Status: CANCELLED | OUTPATIENT
Start: 2024-07-05

## 2024-07-05 RX ADMIN — LABETALOL HYDROCHLORIDE 20 MG: 5 INJECTION, SOLUTION INTRAVENOUS at 11:03

## 2024-07-05 RX ADMIN — ESMOLOL HYDROCHLORIDE 60 MG: 10 INJECTION, SOLUTION INTRAVENOUS at 11:03

## 2024-07-05 RX ADMIN — SUCCINYLCHOLINE CHLORIDE 90 MG: 20 INJECTION, SOLUTION INTRAMUSCULAR; INTRAVENOUS; PARENTERAL at 11:09

## 2024-07-05 RX ADMIN — Medication 100 MG: at 11:06

## 2024-07-05 RX ADMIN — KETOROLAC TROMETHAMINE 30 MG: 30 INJECTION, SOLUTION INTRAMUSCULAR at 10:53

## 2024-07-05 RX ADMIN — NICARDIPINE HYDROCHLORIDE 1000 MCG: 25 INJECTION INTRAVENOUS at 11:03

## 2024-07-05 ASSESSMENT — COPD QUESTIONNAIRES: COPD: 1

## 2024-07-05 NOTE — ANESTHESIA PREPROCEDURE EVALUATION
Anesthesia Pre-Procedure Evaluation    Patient: Randi Cleary   MRN: [id number] : 1955        Procedure : *ECT          Past Medical History:   Diagnosis Date    Bipolar 2 disorder (H)     Breast cancer (H)     lumpectomy, radiation, chemo    Chronic pain syndrome     COPD (chronic obstructive pulmonary disease) (H)     asthma    Cord compression (H) 2021    Dizzy     Drug tolerance     opioid    Esophageal reflux     Fatigue     Generalized anxiety disorder     Graves disease     Hemochromatosis 2018    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; H63D not detected    History of breast cancer 2020    Formatting of this note might be different from the original. Created by Conversion  Replacement Utility updated for latest IMO load Formatting of this note might be different from the original. Created by Conversion  Replacement Utility updated for latest IMO load    History of corticosteroid therapy 2019    History of partial adrenalectomy (H24) 2019    History of pheochromocytoma 2019    Hx antineoplastic chemotherapy     Hx of radiation therapy     Hyperlipidaemia     Hypertension     Impaired fasting glucose     Injury of neck, whiplash 07/15/2021    Joint pain     KYAW (obstructive sleep apnea) 2016    Osteopenia     Pheochromocytoma, left 2017    laparoscopically removed    Postablative hypothyroidism 1995    Prediabetes 10/03/2019    by A1c    Psoriasis     Psoriatic arthropathy (H)     Right rotator cuff tear     RLS (restless legs syndrome)     on ropinorole    Sacroiliitis (H24)     Serotonin syndrome 2020    [facility] - While on desvenlafaxine 100mg    Snoring     Spinal stenosis     Status post coronary angiogram 10/03/2019    Urinary incontinence     Vitamin B 12 deficiency 2009    Vitamin D deficiency 2010      Past Surgical History:   Procedure Laterality Date    ARTHRODESIS ANKLE      ARTHROPLASTY ANKLE Right 2015    Procedure: ARTHROPLASTY  ANKLE;  Surgeon: Jason Coughlin MD;  Location: [facility]    ARTHROPLASTY REVISION ANKLE Right 6/29/2015    Procedure: ARTHROPLASTY REVISION ANKLE;  Surgeon: Jason Coughlin MD;  Location: [facility]    BIOPSY BREAST      BREAST BIOPSY, CORE RT/LT      COLONOSCOPY      COLONOSCOPY N/A 2/25/2021    Procedure: COLONOSCOPY;  Surgeon: Guru Elke Tolbert MD;  Location:  GI    CV CORONARY ANGIOGRAM N/A 10/3/2019    Procedure: CV CORONARY ANGIOGRAM;  Surgeon: Bryce Pierre MD;  Location:  HEART CARDIAC CATH LAB    CV RIGHT HEART CATH MEASUREMENTS RECORDED N/A 10/3/2019    Procedure: CV RIGHT HEART CATH;  Surgeon: Bryce Pierre MD;  Location:  HEART CARDIAC CATH LAB    ESOPHAGOSCOPY, GASTROSCOPY, DUODENOSCOPY (EGD), COMBINED N/A 2/25/2021    Procedure: ESOPHAGOGASTRODUODENOSCOPY, WITH BIOPSY;  Surgeon: Guru Elke Tolbert MD;  Location:  GI    EYE SURGERY  2021    HC REMOVE TONSILS/ADENOIDS,<11 Y/O      Description: Tonsillectomy With Adenoidectomy;  Recorded: 04/07/2010;    IR LUMBAR EPIDURAL STEROID INJECTION  10/26/2004    IR LUMBAR EPIDURAL STEROID INJECTION  11/16/2004    IR LUMBAR EPIDURAL STEROID INJECTION  12/21/2004    IR LUMBAR EPIDURAL STEROID INJECTION  6/8/2006    JOINT REPLACEMENT      LAMINOPLASTY CERVICAL POSTERIOR THREE+ LEVELS Left 12/21/2021    Procedure: CERVICAL 3-CERVICAL 6 LEFT OPEN DOOR LAMINOPLASTY AND LEFT CERVICAL 4-5 AND CERVICAL 6-7 POSTERIOR FORAMINOTOMY;  Surgeon: Angela Gregory MD;  Location: [facility]    LAPAROSCOPIC ADRENALECTOMY Left 08/02/2017    pheochromocytoma    LAPAROSCOPIC ADRENALECTOMY Left 8/2/2017    Procedure: LAPAROSCOPIC LEFT ADRENALECTOMY, ;  Surgeon: Gab Linares MD;  [place] [facility]  Service:     LENGTHEN TENDON ACHILLES Right 6/29/2015    Procedure: LENGTHEN TENDON ACHILLES;  Surgeon: Jason Coughlin MD;  Location: [facility]    LUMPECTOMY BREAST      LUMPECTOMY BREAST Left 1994  "   MAMMOPLASTY REDUCTION Right 2015    [place]    MAMMOPLASTY REDUCTION Right     approx late /    MASTECTOMY      left lumpectomy with axillary node dissection    MASTECTOMY MODIFIED RADICAL      OTHER SURGICAL HISTORY Right     reconstructive breast surgery    OTHER SURGICAL HISTORY      Adrenalectomy for pheochromocytoma    FL MASTECTOMY, MODIFIED RADICAL      Description: Modified Radical Mastectomy Left Breast;  Recorded: 2010;    REPAIR HAMMER TOE Right 2015    Procedure: REPAIR HAMMER TOE;  Surgeon: Jason Coughlin MD;  Location: [facility]    TONSILLECTOMY      TONSILLECTOMY & ADENOIDECTOMY      ZC ARTHRODESIS,ANKLE,OPEN Right     Description: Ankle Arthrodesis;  Recorded: 2010;      Allergies   Allergen Reactions    Serotonin Reuptake Inhibitors (Ssris) Anxiety, Difficulty breathing, Headache, Palpitations and Shortness Of Breath    Buspirone      The patient states she had serotonin syndrome    Cephalexin      Other reaction(s): unknown rxn.    Desvenlafaxine      Serotonin syndrome    Diclofenac Sodium [Diclofenac]      Serotonin syndrome and restless legs syndrome    Gabapentin      Drove on the wrong side of the highway    Levofloxacin      \"CAN'T REMEMBER\"    Penicillins      \"SORES IN MOUTH\"    Riluzole Difficulty breathing and Swelling    Sulfa Antibiotics      \"PT DOES NOT KNOW WHAT THE REACTION WAS\"    Topiramate Other (See Comments)     Frequent urination      Social History     Tobacco Use    Smoking status: Former     Current packs/day: 0.00     Average packs/day: 2.5 packs/day for 29.2 years (72.9 ttl pk-yrs)     Types: Cigarettes     Start date: 1971     Quit date: 2000     Years since quittin.0     Passive exposure: Past    Smokeless tobacco: Never   Substance Use Topics    Alcohol use: Not Currently     Comment: relapse 2021 sober       Wt Readings from Last 1 Encounters:   24 88 kg (194 lb)        Anesthesia Evaluation   Pt has had " prior anesthetic.     No history of anesthetic complications       ROS/MED HX  ENT/Pulmonary:     (+) sleep apnea, uses CPAP,                        COPD,              Neurologic: Comment: RLS (restless legs syndrome)      Cardiovascular: Comment: Pulmonary HTN.  Followed by Cardiology.  Next right heart cath scheduled for 6/19/2024.    (+)  hypertension- -   -  - -                                 Previous cardiac testing   Echo: Date: Results:    Stress Test:  Date: Results:    ECG Reviewed:  Date: 5/21/2024 Results:  RBBB, sinus rhythm  Cath:  Date: Results:      METS/Exercise Tolerance:     Hematologic: Comments: Hemochromatosis      Musculoskeletal: Comment: S/P Cervical Surgery - laminectomy   SIJ pain & joint pain  Osteopenia  Hx/o Right rotator cuff tear  (+)  arthritis,             GI/Hepatic: Comment: Hepatic steatosis    (+) GERD,                   Renal/Genitourinary:       Endo: Comment: Graves Disease. S/P Radioiodine Tx.    Hx of pheochromocytoma, S/P Left Adrenalectomy 8/2017    (+)          thyroid problem,     Obesity,       Psychiatric/Substance Use: Comment: MDD, recurrent, severe, with anxious distress    (+) psychiatric history bipolar       Infectious Disease:       Malignancy:   (+) Malignancy, History of Breast.Breast CA Remission status post Surgery and Chemo.      Other:      (+)  , H/O Chronic Pain,         Physical Exam    Airway  airway exam normal      Mallampati: II   TM distance: > 3 FB   Neck ROM: full   Mouth opening: > 3 cm    Respiratory Devices and Support         Dental       (+) Minor Abnormalities - some fillings, tiny chips      Cardiovascular   cardiovascular exam normal          Pulmonary   pulmonary exam normal            Other findings: S/P Cervical Surgery, good ROM  OUTSIDE LABS:  CBC:   Lab Results   Component Value Date    WBC 7.9 06/04/2024    WBC 7.1 05/22/2024    HGB 17.0 (H) 06/04/2024    HGB 17.7 (H) 05/22/2024    HCT 48.1 (H) 06/04/2024    HCT 49.9 (H)  05/22/2024     06/04/2024     05/22/2024     BMP:   Lab Results   Component Value Date     06/04/2024     05/22/2024    POTASSIUM 4.3 06/04/2024    POTASSIUM 5.2 06/04/2024    CHLORIDE 103 06/04/2024    CHLORIDE 104 05/22/2024    CO2 21 (L) 06/04/2024    CO2 24 05/22/2024    BUN 17.3 06/04/2024    BUN 9.3 05/22/2024    CR 0.53 06/04/2024    CR 0.57 05/22/2024    GLC 96 06/04/2024     (H) 05/22/2024     COAGS:   Lab Results   Component Value Date    PTT 34 12/13/2021    INR 0.94 12/13/2021     POC:   Lab Results   Component Value Date     (H) 02/25/2021     HEPATIC:   Lab Results   Component Value Date    ALBUMIN 3.7 06/04/2024    PROTTOTAL 7.2 06/04/2024    ALT 33 06/04/2024    AST 35 06/04/2024    ALKPHOS 80 06/04/2024    BILITOTAL 0.4 06/04/2024     OTHER:   Lab Results   Component Value Date    A1C 5.6 05/22/2024    LINDA 9.5 06/04/2024    MAG 1.9 05/22/2024    TSH 1.69 06/04/2024    T4 1.49 03/29/2024    T3 114 01/18/2023    CRP <2.9 11/16/2021    SED 8 10/17/2023       Anesthesia Plan    ASA Status:  3    NPO Status:  NPO Appropriate    Anesthesia Type: General.     - Airway: Mask Only   Induction: Intravenous.   Maintenance: N/A.        Consents    Anesthesia Plan(s) and associated risks, benefits, and realistic alternatives discussed. Questions answered and patient/representative(s) expressed understanding.     - Discussed: Risks, Benefits and Alternatives for BOTH SEDATION and the PROCEDURE were discussed     - Discussed with:  Patient      - Extended Intubation/Ventilatory Support Discussed: No.      - Patient is DNR/DNI Status: No     Use of blood products discussed: No .     Postoperative Care    Pain management: Oral pain medications.   PONV prophylaxis: Ondansetron (or other 5HT-3)     Comments:    Other Comments: ECT           Thea Ford MD    I have reviewed the pertinent notes and labs in the chart from the past 30 days and (re)examined the patient.   "Any updates or changes from those notes are reflected in this note.             # Obesity: Estimated body mass index is 31.31 kg/m  as calculated from the following:    Height as of 7/2/24: 1.676 m (5' 6\").    Weight as of 7/2/24: 88 kg (194 lb).      "

## 2024-07-05 NOTE — TELEPHONE ENCOUNTER
Caller: LVM for Winnie    Reason for Reschedule/Cancellation   (please be detailed, any staff messages or encounters to note?): Patient needs to wait until after 7/30 when she has appointment with cardiologist.       Prior to reschedule please review:  Ordering Provider:     Guru Elke Tolbert MD in [facility]     Sedation Determined: mac  Does patient have any ASC Exclusions, please identify?: n      Notes on Cancelled Procedure:  Procedure: Lower Endoscopy [Colonoscopy]   Date: 07/16/2024  Location: [facility]; [address] [place] [address], [address]rd Floor, [place] [address]   Surgeon: Didi       Rescheduled: No,         Did you cancel or rescheduled an EUS procedure? No.

## 2024-07-05 NOTE — TELEPHONE ENCOUNTER
Per : Hold off until after cardiac catheterization. Message sent to scheduling.   Cinda Barrett RN

## 2024-07-05 NOTE — OR NURSING
1115 arrived into pacu meets phase ll criteria in phase ll   1130 oriented to name and , 1140 name  place.  1145 oriented x 4

## 2024-07-05 NOTE — DISCHARGE INSTRUCTIONS
ECT Discharge Instructions      During your ECT series:    Do not drive or work heavy equipment until 7 days after your last treatment.  Do not drink alcohol or use street drugs (illicit drugs) while you are having treatments.  Do not make important decisions, including legal decisions.    After each treatment:    Get plenty of rest. A responsible adult must stay with your for at least 6 hours.  Avoid heavy or risky activities for 24 hours while in maintenance ECT.   Do not drive for at least 24 hours after your treatment while in maintenance ECT.   If you have more than one treatment within one week, do not drive for 7 days after your last treatment.   If you feel light-headed, sit for a few minutes before standing. Have someone help you get up.  If you have nausea (feel sick to your stomach): Drink only clear liquids such as apple juice, ginger ale, broth or 7UP, Be sure to drink plenty of liquids. Move to a normal diet as you feel able.   If you received Toradol, wait 6 hours before taking ibuprofen.  Call your doctor if:   You have a fever over 100F (37.7 C) (taken under the tongue), or a fever that last more than 24 hours.  Your IV site is red, swollen, very painful or is getting more tender.  You have nausea that gets very bad or does not improve.    If you have any symptoms after ECT, tell our staff before your next treatment.  The ECT Department can be reached at [phone].  The ECT Department is open Mondays, Wednesdays and Fridays from 7:00 AM to 2:00 PM.    To speak to a doctor, call:  Your primary care provider or [facility] for Dr. Beavers, Dr. Hutchison or Dr. Cotter PHONE: [phone]; fax: [phone]    New instructions:   -- Remember to NOT take gabapentin after 6pm the night before each treatment  -- You will have to check to make sure somebody can drive you to and from the hospital and monitor you for 6 hours after each treatment  -- Maintenance ECT starts weekly for several weeks,  then goes down to every two weeks, then to once every month.  The goal of maintenance ECT is to space out and eventually stop  -- You cannot drive for the next week.  Then, during maintenance, you can't drive for 24 hours after each treatment.  - The 55+ intensive outpatient program is an excellent idea, and I think will be more likely to help the daily ups and downs of your mood that are still present    - We discussed other alternatives including trying ketamine through the [facility] or staying on your regular medications and therapy, but at the moment you were most interested in pursuing maintenance    - Per Dr Varela:   - Consider trial of serotonin modulator (e.g., vilazodone vs. vortioxetine) or novel agent Auvelity  - Consider augmentation with atypical antipsychotic (e.g., quetiapine or aripiprazole), though there are concerns given pre-diabetes     You have received Toradol today at 10:53 AM. Toradol is an NSAID.  Do not take any NSAID's including: Ibuprofen, Naproxen Sodium, Asprin, Advil, Motrin, Aleve, Shannan, etc., until six hours after the above time which would be 5:53 PM. If you have any questions check back with your Physician, or pharmacist.     Covid-19 Testing:  We are no longer requiring covid tests prior to your procedure. If you are ill, please call the ECT department to discuss plan for rescheduling your appointment. [phone]    Please come to the [facility] and check-in with Security before your ECT appointment.  The [facility] is located right next to the Adult Emergency Room.  The address is [address] [place] [address].    After your appointment, you may be picked up at the [facility] entrance, which is located at [address] [place] [address].  [facility], about 1 block North of the [facility].    Transported by:   Sidney ()     Reviewed AVS discharge instructions with:   Sidney ()

## 2024-07-05 NOTE — ANESTHESIA CARE TRANSFER NOTE
Patient: Randi Cleary    Procedure: * No procedures listed *  Electroconvulsive Therapy    Diagnosis: * No pre-op diagnosis entered *  Diagnosis Additional Information: No value filed.    Anesthesia Type:   General     Note:    Oropharynx: oropharynx clear of all foreign objects and spontaneously breathing  Level of Consciousness: awake  Oxygen Supplementation: room air    Independent Airway: airway patency satisfactory and stable  Dentition: dentition unchanged  Vital Signs Stable: post-procedure vital signs reviewed and stable  Report to RN Given: handoff report given  Patient transferred to: PACU    Handoff Report: Identifed the Patient, Identified the Reponsible Provider, Reviewed the pertinent medical history, Discussed the surgical course, Reviewed Intra-OP anesthesia mangement and issues during anesthesia, Set expectations for post-procedure period and Allowed opportunity for questions and acknowledgement of understanding      Vitals:  Vitals Value Taken Time   /77 07/05/24 1118   Temp 36.9  C (98.5  F) 07/05/24 1130   Pulse 78 07/05/24 1132   Resp 16 07/05/24 1132   SpO2 90 % 07/05/24 1132   Vitals shown include unfiled device data.    Electronically Signed By: Thea Ford MD  July 5, 2024  11:33 AM

## 2024-07-05 NOTE — PROCEDURES
"Procedures  [facility], [place]   ECT Procedure Note   07/05/2024    Randi Cleary is a 68 year old female patient.  [id number]    Patient Status: IN-patient    Is this the first in a series of 12 treatments?  No      Allergies   Allergen Reactions    Serotonin Reuptake Inhibitors (Ssris) Anxiety, Difficulty breathing, Headache, Palpitations and Shortness Of Breath    Buspirone      The patient states she had serotonin syndrome    Cephalexin      Other reaction(s): unknown rxn.    Desvenlafaxine      Serotonin syndrome    Diclofenac Sodium [Diclofenac]      Serotonin syndrome and restless legs syndrome    Gabapentin      Drove on the wrong side of the highway    Levofloxacin      \"CAN'T REMEMBER\"    Penicillins      \"SORES IN MOUTH\"    Riluzole Difficulty breathing and Swelling    Sulfa Antibiotics      \"PT DOES NOT KNOW WHAT THE REACTION WAS\"    Topiramate Other (See Comments)     Frequent urination       Weight:  0 lbs 0 oz / 0 kg          Indications for ECT:   Medications ineffective and Psychotherapies ineffective         Clinical Narrative:   HPI - The patient describes a lifelong history of depression dating back to elementary school, worsening after her father's death when she was 15yo and especially in the 1980s in the setting of worsening physical health (since falling and breaking her ankle), which led to the the initiation of fluoxetine, her first antidepressant.  Her history has been primarily characterized by low mood, with some ups and downs but no extended depression-free periods since then.  She has tried many different medications from different classes, but cannot recall any one being particularly helpful for her.  She has experienced past episodes of particularly irritable mood and concomitant decreased sleep need, although most of these have lasted 1-2 days and triggered by anger related to external stressors.  She has at least one episode of a more extended " "episode of elevated mood (and associated grandiosity, increased spending, flight of ideas, increased goal directed behaviors, pressured speech, and odd and embarrassing behavior), which occurred in the context of relapse on alcohol in 2021. She does not endorse any events before about age 48.     The patient's depression is characterized by near daily low mood, frequent anhedonia, with sleep difficulties (although c/b pain, KYAW, and RLS), fatigue, feelings of guilt/worthlessness, and impaired concentration.  She reports feelings of \"despair,\" at times with active SI with plan, but denies any intent to act on this - \"maybe it's hope.\"  She has 1 lifetime suicide attempt (overdose) in the 1980s, for which she was psychiatrically hospitalized.  She has a remote history of SIB (cutting) but not recently.       Psych pertinent item history includes includes suicide attempt , suicidal ideation, SIB , aggression, trauma hx, substance use: alcohol, cannabis, and Patient has a history of alcohol dependence treated 20+ years ago and she relapsed in 2020 for a couple of months, in her 20s she\" loved getting high\" on cocaine, LSD, mushrooms, speed, white cross, mutiple psychotropic trials , psych hosp, ketamine, and major medical problems.    Target Symptoms for Improvement: Amotivation, Fatigue, Improved self-image and Panic attacks         Diagnosis:   Major depression         Assessment:   #1 05/24/24 Some circumstantial thought, needs some redirection, 3.5 hours sleep last night, anxious, mood 1/10, PDW but no SI, never had ECT before - only TMS. No AVH.   #2 05/29/24  Mood 4/10, better over w/e, some word find difficulties, no AVH/SI/PDW. Chronic sciatica pain, neck and shoulder pain - didn't worsen with ECT. Mild headache.   #3 05/31/24  Mood 4/10, No SI, PDW, AVH, some wrist pain and headache, memory might be improving.    #4 6/3/24  Phq-9= 13, mood 3/10.  Yesterday was very tearful, still a bit today.  Last treatment " "had awareness under paralysis.  Otherwise, some initial confusion after first ECT but no subsequent cognitive effects.  Signed consent to continue.  #5 6/5/24 Mood 4-5/10  She feels like her \"rage\" is less and she feels lighter. but no cognitive side effects. She complains of excessive sweating and is concerned her thryoid is unbalanced. She is somatically focused She reports pain on the side of her neck radiating down to her chest. She had a migraine she thinks over the weekend which usual starts as occipital tension.  #6 6/7/24 mood 4-5/10. No SI. She does have some baseline long term memory difficulties no AVH.   #7 6/10/24  She still is worried that She is not able to go home. She had visitors this weekend who said \"you don't seem to have the weight of the world on your shoulders anymore\" she disagrees she had a downward spiral over the weekend when there was a mix up with her clothes and she usually feels tearful after ECT. She does not report anything feeling worse. She gets down on herself when she has an anxiety spiral and it was the 1st one she has had since admission.   #8 6/12/24 Winnie reported some tearfulness overnight. She is noticing a weight lifting mood 6/10 . Reports some difficulty with remembering names but believes this is at baseline.   #9 6/14/24 Mood 5/10 (10 best) She feels like she is improving each time . She still has occasional crying spells but she feels she bounces back quicker. She is still anxious about going home. No Si. Tolerating ECT  #10 06/17/24 mood 5/10 She does feellike she has gotten some improvement since starting Ect but still has times where she gets tearful and anxious \"goes down the rabit hole\" but not as often . She has had ketamine troches before with pain  management to no effect but wonders about ketamine infusions.  #11 06/19/24 Mood today is 5/10. Patient is wondering whether she could do a ketamine course (did have ketamine in the past), despite of being on " "opioids. Feels that ketamine can help with \"anger, tearing and anxiety.\" She complains to the anesthesiologist about recent urinary incontinence which needs to be considered when  using ketamine. She wants to try it for anger ,tearing and anxiety which is not a standard indication  #12 06/21/24 Mood 5/10, no PDW/SI, but some anxiety around pain this AM.  Patient is interested in maintenance ECT at her attending psychiatrist's recommendation to prevent the possibility of her mood dipping as low as it did previously that led to her hospitalization.  Discussed pros and cons of maintenance ECT, suggested alternative of maintenance medications/therapy and/or watchful waiting with possibility of retreatment should she relapse, as well as alternate interventions including ketamine.  At the moment, she is most interested in pursuing maintenance ECT - will plan for next Friday pending confirmation with her family that she has post-ECT ride and monitoring.  M 06/28/24 Mood ups and downs, irritability, anxiety, sadness switching multiple times a day since being discharged home.  Had difficulty with the transition home, was harder than she thought it would be.  However, still able to \"push away\" PDW/SI, and did not have periods of persistently low mood this past week.  Has noticed some anterograde and retrograde amnesia of the 1-2 months prior to starting ECT.  Will continue to track.  Today, mood 6/10 \"now that I'm at ECT.\"  M 07/05/24 She was tearful, states that she doesn't feel good, \"starting to drop\", states that the mood change happened in Wednesday somewhat suddenly, when she encountered several business stressors and felt overwhelmed. Mood 3-4/10. Denies SI and feels safe at home. Still reports memory issues. Reports that the day program has been overwhelming.    She cancelled her colonoscopy in order to focus on her right heart cath the next week. She feels like she has too many procedures scheduled and pushed off her " sleep study also.         Pause for the Cause:     Correct patient Yes   Correct procedure/laterality settings: Yes           Intra-Procedure Documentation:     ECT #: 14   Treatment number this series: 14   Total treatment number: 14     Type of ECT:  Right, unilateral ultrabrief    ECT Medications:    Toradol 30mg iv - for headache/myalgia     Brevital: 100 mg (incr from 90 mg as still awake)   Succinyl Choline: 90 mg    BP - nicardipine 1500 mcg         ECT Strip Summary: RUL Titration #3: 38.4 mC   Energy Level:  384.0mC, 0.3 ms, 100 Hz, 8 sec, 800 mA     Motor Seizure Duration: 18  seconds  EEG Seizure Duration: 32 seconds      Complications: none  Plan:   - Plan for maintenance Q1wk  - Monitor depression severity with clinical assessment augmented with PHQ9 every other treatment  - Continue current medications    Discharge instructions:    -- Remember to NOT take gabapentin after 6pm the night before each treatment  -- You will have to check to make sure somebody can drive you to and from the hospital and monitor you for 6 hours after each treatment  -- Maintenance ECT starts weekly for several weeks, then goes down to every two weeks, then to once every month.  The goal of maintenance ECT is to space out and eventually stop  -- You cannot drive for the next week.  Then, during maintenance, you can't drive for 24 hours after each treatment.  - The 55+ intensive outpatient program is an excellent idea, and I think will be more likely to help the daily ups and downs of your mood that are still present  -   - We discussed other alternatives including trying ketamine through the [facility] or staying on your regular medications and therapy, but at the moment you were most interested in pursuing maintenance    - Per Dr Varela:   - Consider trial of serotonin modulator (e.g., vilazodone vs. vortioxetine) or novel agent Auvelity  - Consider augmentation with atypical antipsychotic (e.g., quetiapine or  aripiprazole), though there are concerns given pre-diabetes     Assited by Dr Leonard Cotter MD  Dept of Psychiatry

## 2024-07-05 NOTE — ANESTHESIA POSTPROCEDURE EVALUATION
Patient: Randi Cleary    Procedure:   Electroconvulsive Therapy    Anesthesia Type:  General    Note:  Disposition: Admission   Postop Pain Control: Uneventful            Sign Out: Well controlled pain   PONV: No   Neuro/Psych: Uneventful            Sign Out: Acceptable/Baseline neuro status   Airway/Respiratory: Uneventful            Sign Out: Acceptable/Baseline resp. status   CV/Hemodynamics: Uneventful            Sign Out: Acceptable CV status; No obvious hypovolemia; No obvious fluid overload   Other NRE: NONE   DID A NON-ROUTINE EVENT OCCUR? No           Last vitals:  Vitals:    07/05/24 1035 07/05/24 1118 07/05/24 1130   BP: 111/70 126/66    Pulse: 89 69    Resp: 14 (!) 31    Temp: 36.2  C (97.2  F) 36.4  C (97.6  F) 36.9  C (98.5  F)   SpO2: 92% 95%        Electronically Signed By: Thea Ford MD  July 5, 2024  11:34 AM

## 2024-07-08 ENCOUNTER — TELEPHONE (OUTPATIENT)
Dept: CARDIOLOGY | Facility: CLINIC | Age: 69
End: 2024-07-08
Payer: MEDICARE

## 2024-07-08 ENCOUNTER — TELEPHONE (OUTPATIENT)
Dept: BEHAVIORAL HEALTH | Facility: CLINIC | Age: 69
End: 2024-07-08
Payer: MEDICARE

## 2024-07-08 RX ORDER — LANOLIN ALCOHOL/MO/W.PET/CERES
3 CREAM (GRAM) TOPICAL
Qty: 30 TABLET | Refills: 1 | Status: SHIPPED | OUTPATIENT
Start: 2024-07-08 | End: 2024-09-23

## 2024-07-08 RX ORDER — GABAPENTIN 100 MG/1
100 CAPSULE ORAL EVERY 6 HOURS PRN
Qty: 120 CAPSULE | Refills: 1 | Status: SHIPPED | OUTPATIENT
Start: 2024-07-08

## 2024-07-08 RX ORDER — GABAPENTIN 400 MG/1
400 CAPSULE ORAL AT BEDTIME
Qty: 30 CAPSULE | Refills: 1 | Status: SHIPPED | OUTPATIENT
Start: 2024-07-08

## 2024-07-08 NOTE — TELEPHONE ENCOUNTER
2nd attempt spoke with patient and she is aware her colonoscopy is cancelled now. She will call back to reschedule once her heart things get taken care of. Patient requested that a my chart message be sent to her to let her know about the cancellation. Writer sending that.

## 2024-07-08 NOTE — TELEPHONE ENCOUNTER
received a referral for therapy with the transition clinic.  Called patient and was able to schedule for Wednesday, July 10 at 10:30 AM.

## 2024-07-08 NOTE — TELEPHONE ENCOUNTER
Spoke with pt and she was very upset about her upcoming RHC and follow-up appt coming up on 7/30 and 8/6 follow-up.  She has an appt on 7/30 with another MD that she cannot miss and was wanting to reschedule.  After a short conversation, she decided she would talk to her other Dr to see if she can get that one rescheduled and would like to keep the RHC and follow-up with Dr Edwards where it is.  Pt will call back if she needs anything further from our office.    Miki Richardson RN on 7/8/2024 at 12:49 PM

## 2024-07-09 ENCOUNTER — TELEPHONE (OUTPATIENT)
Dept: BEHAVIORAL HEALTH | Facility: CLINIC | Age: 69
End: 2024-07-09
Payer: MEDICARE

## 2024-07-09 NOTE — TELEPHONE ENCOUNTER
Pt called TC, said she has another appt later so needs a different appt. Pt offered 9:30am appointment and agreed that would work better, rescheduled.    Cassidy Baker  Transition Clinic Coordinator  07/09/24 1:04 PM

## 2024-07-09 NOTE — TELEPHONE ENCOUNTER
"Writer called patient after she called the program line to report her absence from IOP/DT 3 55+.  Apparently she had been on her way in to group and called as she was turning around due to high symptoms.  Writer spoke with patient for 25 minutes and covered the following:    Patient reports feeling overwhelmed with the transition from inpatient. (\"everything's coming apart\").  Hasn't had individual therapist since April 2024.  Writer put in transition clinic referral and long term therapy referral.  She feels like she has \"no life\" with evidence that most of today was spent sorting out appointments and phone calls. She didn't feel safe driving because she was \"teary\".  We talked about setting a boundary on phone calls/appointments at least a couple hours before group time so she can focus on self-care and feel more able to get to group.  Another stressor involves a contract she impulsively signed with an investment company.  Writer attempted to assess for potential scam but patient says the company is known/reputable.  She committed to attending group on Wednesday where writer will complete further assessment.    ASHKAN Garcia on 7/9/2024 at 1:40 PM    "

## 2024-07-10 ENCOUNTER — TELEPHONE (OUTPATIENT)
Dept: BEHAVIORAL HEALTH | Facility: CLINIC | Age: 69
End: 2024-07-10
Payer: MEDICARE

## 2024-07-10 ENCOUNTER — VIRTUAL VISIT (OUTPATIENT)
Dept: BEHAVIORAL HEALTH | Facility: CLINIC | Age: 69
End: 2024-07-10
Payer: MEDICARE

## 2024-07-10 DIAGNOSIS — F33.2 SEVERE RECURRENT MAJOR DEPRESSION WITHOUT PSYCHOTIC FEATURES (H): Primary | ICD-10-CM

## 2024-07-10 DIAGNOSIS — F41.1 GENERALIZED ANXIETY DISORDER: ICD-10-CM

## 2024-07-10 PROCEDURE — 90837 PSYTX W PT 60 MINUTES: CPT | Mod: 95 | Performed by: SOCIAL WORKER

## 2024-07-10 ASSESSMENT — COLUMBIA-SUICIDE SEVERITY RATING SCALE - C-SSRS
2. HAVE YOU ACTUALLY HAD ANY THOUGHTS OF KILLING YOURSELF?: NO
1. HAVE YOU WISHED YOU WERE DEAD OR WISHED YOU COULD GO TO SLEEP AND NOT WAKE UP?: NO

## 2024-07-10 NOTE — TELEPHONE ENCOUNTER
Left message requesting call back to reschedule Friday's appointment to an earlier time. Will need to change appointment to 8:40 am.

## 2024-07-10 NOTE — TELEPHONE ENCOUNTER
----- Message from Luh Hare sent at 7/10/2024 12:55 PM CDT -----  Regarding: Pt D/C from 55+ ID3  Patient Winnie is choosing to discharge early from 55+ IOP/DT 3 (M,W,Th, F 1-4pm) effective today 7/10/2024.  Please remove any future appointments from the [facility].    All the best,    ASHKAN Garcia

## 2024-07-10 NOTE — PROGRESS NOTES
Transition Clinic - Initial Visit Progress Note    Patient  Name: Randi Cleary, : 1955    Date:  7/10/2024       Service Type:  Mental Health Initial Visit     VISIT TIME START: 940  VISIT TIME END:      Session Length:   TC Session Length: 60 (53+ Minutes)   Extra time used to help patient destress and go over medical care needs.  Began t creat a plan for her care.    Visit #: 1    Attendees:  TC Attendees: Client alone    Service Modality:  Service Modality: Video Visit:      Provider verified identity through the following two step process.  Patient provided:  Patient  and Patient address    Telemedicine Visit: The patient's condition can be safely assessed and treated via synchronous audio and visual telemedicine encounter.      Reason for Telemedicine Visit: Patient has requested telehealth visit    Originating Site (Patient Location): Patient's home    Distant Site (Provider Location): Provider Remote Setting- Home Office    Consent:  The patient/guardian has verbally consented to: the potential risks and benefits of telemedicine (video visit) versus in person care; bill my insurance or make self-payment for services provided; and responsibility for payment of non-covered services.     Patient would like the video invitation sent by:  My Chart    Mode of Communication:  Video Conference via [employer]    Distant Location (Provider):  Off-site    As the provider I attest to compliance with applicable laws and regulations related to telemedicine.    Source of Referral:  Assessment Center      Informed Consent and Assessment Methods    This provider and patient discussed HIPAA, and the limits of confidentiality; including mandated reporting, the possibility of collaborative discussions with patient's primary care provider and the multidisciplinary team in the MH clinic during consultation.  Discussed the no show policy, and the benefits and possible unintended consequences of therapy.   Patient indicated understanding Transition Clinic services are short term, solution focused, until a referral can be made and patient can bridge to long term therapy.  Patient verbally indicated understanding the informed consent.         Presenting Concerns/  Current Stressors:  Randi Cleary is a 68 year old identified female who was referred to the Transition Clinic by the Care Coordinators via the Assessment Center for short term therapy and referrals to community.  Randi Cleary reports She was late connecting because she was required in [facility] to complete intake forms.  Winnie had a DA completed on June 6 by Jocelynn Garcia.  All intake forms were completed at that time.  Screens were completed to day and reviewed by this writer.  Winnie is tearful she. She is excited, agitated. She has 55+ at 1 pm and is concerned she will arrive late.  Winnie states he is stress, overwhelmed with medical appt's. which leads to anxiety.  Winnie has ECT every Friday. She shared she does not feel well after ECT and requested to miss Friday 55+ group. Winnie states she was told other patient do ECT and don't have a problem attending. Winnie reports Confusion and memory loss after ECT.  Winnie received a call while in session from [facility] with a therapy appt reminder for today at 1 pm.  The appt was made on 6/6/24 following a Diagnostic assessment.  Provider encourage Winnie to keep the appt. Patient shared she has lost many providers she grew to trust because they left .  She was working with Mary Kay Drew until June 2024. Winnie states she has a diagnosis of MDD Sever, Bipolar depressed, Generalized Anxiety disorder, and Post Traumatic Stress Disorder.  Winnie states she does not believe she is bipolar but believes her symptoms are primarily due to her history of extreme trauma.    Winnie shared she does not want to continue with the group but fears she is expected to.  Provider reassured Winnie she can stop if she feels it is too  stressful and overwhelming for her at this time.  Winnie completed the 55+ program over the winter 2024. Winnie shared going to [place] for the group is difficult and means she is away from home most of the day with travel and traffic.    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  Winnie states she has not had a physical in several years and would like to pursue this with her current primary MD.   Provider explained the transition clinic with Winnie.  Provider and Winnie agreed she would return to review medical needs list and to help her prioritze those needs.  Winnie will follow up with [facility] in [place], [place] and with 55+ at .    ASSESSMENT:    Required Screenings: The following assessments were completed by patient for this visit:  PHQ9:       4/29/2024    11:22 AM 4/30/2024     9:38 AM 5/20/2024     8:42 AM 6/6/2024     8:00 AM 6/27/2024    12:47 PM 7/2/2024     8:23 AM 7/10/2024     9:24 AM   PHQ-9 SCORE   PHQ-9 Total Score MyChart  17 (Moderately severe depression) 15 (Moderately severe depression)  12 (Moderate depression) 13 (Moderate depression) 13 (Moderate depression)   PHQ-9 Total Score 11 17    17 15    15 16 12 13 13    13    13     GAD7:       3/5/2024     3:08 PM 4/4/2024     8:52 AM 4/18/2024    12:34 PM 5/15/2024    11:25 AM 6/6/2024     8:00 AM 6/27/2024    12:48 PM 7/1/2024    12:10 PM   CHAVO-7 SCORE   Total Score 13 (moderate anxiety) 13 (moderate anxiety) 17 (severe anxiety) 9 (mild anxiety)  11 (moderate anxiety)    Total Score 13 13    13    13    13 17    17 9    9    9    9 9 11    11    11    11    11 11     CAGE-AID:       6/22/2020     7:12 PM 1/18/2022    11:15 PM 6/6/2024     8:00 AM   CAGE-AID Total Score   Total Score 4 4 4   Total Score MyChart 4 (A total score of 2 or greater is considered clinically significant)  4 (A total score of 2 or greater is considered clinically significant)      PROMIS 10-Global Health (all questions and answers displayed):       12/1/2023    11:52 AM 1/22/2024    12:00 PM 5/1/2024    11:53 AM 5/15/2024    11:27 AM 6/6/2024     8:00 AM 7/1/2024    12:10 PM 7/10/2024     9:53 AM   PROMIS 10   In general, would you say your health is: Fair Fair Fair Fair   Fair   In general, would you say your quality of life is: Poor Poor Poor Poor   Good   In general, how would you rate your physical health? Fair Fair Fair Fair   Fair   In general, how would you rate your mental health, including your mood and your ability to think? Poor Poor Fair Fair   Fair   In general, how would you rate your satisfaction with your social activities and relationships? Poor Poor Fair Poor   Poor   In general, please rate how well you carry out your usual social activities and roles Poor Fair Poor Poor   Poor   To what extent are you able to carry out your everyday physical activities such as walking, climbing stairs, carrying groceries, or moving a chair? A little Moderately Moderately A little   Moderately   In the past 7 days, how often have you been bothered by emotional problems such as feeling anxious, depressed, or irritable? Often Often Often Sometimes   Sometimes   In the past 7 days, how would you rate your fatigue on average? Severe Severe Severe Severe   Mild   In the past 7 days, how would you rate your pain on average, where 0 means no pain, and 10 means worst imaginable pain? 7 7 7 8   7   In general, would you say your health is: 2 2 2 2 2 3 2   In general, would you say your quality of life is: 1 1 1 1 2 2 3   In general, how would you rate your physical health? 2 2 2 2 2 2 2   In general, how would you rate your mental health, including your mood and your ability to think? 1 1 2 2 2 3 2   In general, how would you rate your satisfaction with your social activities and relationships? 1 1 2 1 1 1 1   In general,  "please rate how well you carry out your usual social activities and roles. (This includes activities at home, at work and in your community, and responsibilities as a parent, child, spouse, employee, friend, etc.) 1 2 1 1 1 1 1   To what extent are you able to carry out your everyday physical activities such as walking, climbing stairs, carrying groceries, or moving a chair? 2 3 3 2 2 3 3   In the past 7 days, how often have you been bothered by emotional problems such as feeling anxious, depressed, or irritable? 4 4 4 3 3 4 3   In the past 7 days, how would you rate your fatigue on average? 4 4 4 4 4 2 2   In the past 7 days, how would you rate your pain on average, where 0 means no pain, and 10 means worst imaginable pain? 7 7 7 8 8 7 7   Global Mental Health Score 5 5 7    7    7 7    7 8 8 9    9    9    9    9    9   Global Physical Health Score 8 9 9    9    9 8    8 8 11 11    11    11    11    11    11   PROMIS TOTAL - SUBSCORES 13 14 16    16    16 15    15 16 19 20    20    20    20    20    20     [place] Suicide Severity Rating Scale (Lifetime/Recent)      5/5/2020     9:21 AM 6/11/2020    10:00 AM 1/9/2023     1:00 PM 5/21/2024     4:49 PM 5/21/2024     9:00 PM 6/6/2024     8:00 AM 7/10/2024    12:00 PM   [place] Suicide Severity Rating (Lifetime/Recent)   Q1 Wish to be Dead (Lifetime) Yes Yes   Yes     Comments \"When I was going through a couple of  years of counseling from a dysfunctional family about 30 years ago or more\" when patient was in treatment and there were family concerns   OD on medications     Q2 Non-Specific Active Suicidal Thoughts (Lifetime) Yes Yes   No     Non-Specific Active Suicidal Thought Description (Lifetime) Had thoughts of killing self         Most Severe Ideation Rating (Lifetime) 5 5   5     Most Severe Ideation Description (Lifetime) 35 years ago \"I just wanted to check out , I had thought of it so much and wanted to be done\" when family problems were happening   OD on " medication     Frequency (Lifetime) 4    3     Duration (Lifetime) 2    3     Controllability (Lifetime) 3 0   2     Protective Factors  (Lifetime) 2 5   4     Reasons for Ideation (Lifetime) 5    5     Q1 Wished to be Dead (Past Month)   yes 1-->yes 1-->yes 1-->yes    Q2 Suicidal Thoughts (Past Month)   no 1-->yes 1-->yes 1-->yes    Q3 Suicidal Thought Method   no 0-->no 0-->no 1-->yes    Q4 Suicidal Intent without Specific Plan   no 1-->yes 0-->no 0-->no    Q5 Suicide Intent with Specific Plan   no 0-->no 0-->no 1-->yes    Q6 Suicide Behavior (Lifetime)   yes 1-->yes 0-->no 1-->yes    If yes to Q6, within past 3 months?   no 1-->yes 0-->no 0-->no    Level of Risk per Screen   moderate risk high risk moderate risk high risk    RETIRED: 1. Wish to be Dead (Recent) No No        RETIRED: 2. Non-Specific Active Suicidal Thoughts (Recent) No No        3. Active Suicidal Ideation with any Methods (Not Plan) Without Intent to Act (Lifetime) Yes Yes        RETIRE: Active Suicidal Ideation with any Methods (Not Plan) Description (Lifetime) Took many pills of Prozac and was sent to the ED 30 years prior        RETIRED: 3. Active Suicidal Ideation with any Methods (Not Plan) Without Intent to Act (Recent) No         RETIRE: 4. Active Suicidal Ideation with Some Intent to Act, Without Specific Plan (Lifetime) Yes Yes        RETIRE: Active Suicidal Ideation with Some Intent to Act, Without Specific Plan Description (Lifetime) Took many pills of Prozac and was sent to the ED         4. Active Suicidal Ideation with Some Intent to Act, Without Specific Plan (Recent) No         RETIRE: 5. Active Suicidal Ideation with Specific Plan and Intent (Lifetime) Yes Yes        RETIRE: Active Suicidal Ideation with Specific Plan and Intent Description (Lifetime) Took many pills of Prozac and was sent to the ED over 30 years ago         RETIRED: 5. Active Suicidal Ideation with Specific Plan and Intent (Recent) No         Most Severe  Ideation Rating (Past Month) NA         Frequency (Past Month) NA         Duration (Past Month) NA         Controllability (Past Month) NA         Protective Factors (Past Month) NA         Reasons for Ideation (Past Month) NA         Actual Attempt (Lifetime) Yes Yes        Actual Attempt Description (Lifetime) Took a bunch of pills patient reported overdosing on Prozac about thirty years ago        Total Number of Actual Attempts (Lifetime) 1 1        Actual Attempt (Past 3 Months) No No        Has subject engaged in non-suicidal self-injurious behavior? (Lifetime) Yes Yes        Has subject engaged in non-suicidal self-injurious behavior? (Past 3 Months) No No        Interrupted Attempts (Lifetime) No No        Interrupted Attempts (Past 3 Months) No No        Aborted or Self-Interrupted Attempt (Lifetime) No No        Total Number Aborted or Self Interrupted Attempts (Lifetime) 0         Aborted or Self-Interrupted Attempt (Past 3 Months) No No        Preparatory Acts or Behavior (Lifetime) No No        Preparatory Acts or Behavior (Past 3 Months) No No        Most Recent Attempt Date 10/1/1984         Comments  30 years prior        Most Recent Attempt Actual Lethality Code 2 0        Comments This is a guess/pt. doesn't recall exact month or day         Most Lethal Attempt Actual Lethality Code 2         Comments only 1 attempt/same attempt as most recent & initial         Initial/First Attempt Date 10/1/1984         Initial/First Attempt Actual Lethality Code 2         Q1 Wish to be Dead (Lifetime)       N   Q2 Non-Specific Active Suicidal Thoughts (Lifetime)       N       Mental Status Assessment:  Appearance:   Appropriate   Eye Contact:   Good   Psychomotor Behavior: Agitated   Attitude:   Cooperative   Orientation:   Person Place Time Situation  Speech     Rate / Production:  Normal/ Responsive     Volume:   Normal   Mood:    Anxious  Depressed   Affect:    Appropriate   Thought Content:  Clear   Thought  Form:  Goal Directed   Insight:    Good       Safety Issues and Plan for Safety and Risk Management:     Patient denies current fears or concerns for personal safety.  Patient reports the following current or recent suicidal ideation or behaviors: has experienced mild SI with no plan or intent.  Patient denies current or recent homicidal ideation or behaviors.  Patient denies current or recent self injurious behavior or ideation.  Patient denies other safety concerns.  Recommended that patient call 911 or go to the local ED should there be a change in any of these risk factors.  Patient reports there are no firearms in the house.     Diagnostic Criteria:  Generalized Anxiety Disorder  A. Excessive anxiety and worry about a number of events or activities (such as work or school performance).   B. The person finds it difficult to control the worry.  C. Select 3 or more symptoms (required for diagnosis). Only one item is required in children.   - Restlessness or feeling keyed up or on edge.    - Being easily fatigued.    - Difficulty concentrating or mind going blank.    - Irritability.    - Muscle tension.   D. The focus of the anxiety and worry is not confined to features of an Axis I disorder.  E. The anxiety, worry, or physical symptoms cause clinically significant distress or impairment in social, occupational, or other important areas of functioning.   F. The disturbance is not due to the direct physiological effects of a substance (e.g., a drug of abuse, a medication) or a general medical condition (e.g., hyperthyroidism) and does not occur exclusively during a Mood Disorder, a Psychotic Disorder, or a Pervasive Developmental Disorder.  Major Depressive Disorder  CRITERIA (A-C) REPRESENT A MAJOR DEPRESSIVE EPISODE - SELECT THESE CRITERIA  A) Recurrent episode(s) - symptoms have been present during the same 2-week period and represent a change from previous functioning 5 or more symptoms (required for  diagnosis)   - Depressed mood. Note: In children and adolescents, can be irritable mood.     - Diminished interest or pleasure in all, or almost all, activities.    - Significant weight loss when not dieting no change in appetite.    - Decreased sleep.    - Psychomotor activity agitation.    - Fatigue or loss of energy.    - Feelings of worthlessness or inappropriate guilt.    - Diminished ability to think or concentrate, or indecisiveness.   B) The symptoms cause clinically significant distress or impairment in social, occupational, or other important areas of functioning  C) The episode is not attributable to the physiological effects of a substance or to another medical condition  D) The occurence of major depressive episode is not better explained by other thought / psychotic disorders  E) There has never been a manic episode or hypomanic episode  Post- Traumatic Stress Disorder  A. The person has been exposed to a traumatic event in which both of the following were present:     (1) the person experienced, witnessed, or was confronted with an event or events that involved actual or threatened death or serious injury, or a threat to the physical integrity of self or others     (2) the person's response involved intense fear, helplessness, or horror. Note: In children, this may be expressed instead by disorganized or agitated behavior  B. The traumatic event is persistently reexperienced in one (or more) of the following ways:     - Recurrent and intrusive distressing recollections of the event, including images, thoughts, or perceptions. Note: In young children, repetitive play may occur in which themes or aspects of the trauma are expressed.      - Recurrent distressing dreams of the event. Note: In children, there may be frightening dreams without recognizable content.      - Acting or feeling as if the traumatic event were recurring (includes a sense of reliving the experience, illusions, hallucinations, and  dissociative flashback episodes, including those that occur on awakening or when intoxicated). Note: In young children, trauma-specific reenactment may occur.      - Intense psychological distress at exposure to internal or external cues that symbolize or resemble an aspect of the traumatic event.      - Physiological reactivity on exposure to internal or external cues that symbolize or resemble an aspect of the traumatic event.      - Efforts to avoid thoughts, feelings, or conversations associated with the trauma.      - Inability to recall an important aspect of the trauma.      - Markedly diminished interest or participation in significant activities.      - Feeling of detachment or estrangement from others.   D. Persistent symptoms of increased arousal (not present before the trauma), as indicated by two (or more) of the following:     - Difficulty falling or staying asleep.      - Irritability or outbursts of anger.      - Difficulty concentrating.      - Hypervigilance.   E. Duration of the disturbance is more than 1 month.  F. The disturbance causes clinically significant distress or impairment in social, occupational, or other important areas of functioning.    DSM5 Diagnoses: (Sustained by DSM5 Criteria Listed Above)  Diagnoses: 296.33 (F33.2) Major Depressive Disorder, Recurrent Episode, Severe _  300.02 (F41.1) Generalized Anxiety Disorder  309.81 (F43.10) Posttraumatic Stress Disorder (includes Posttraumatic Stress Disorder for Children 6 Years and Younger)  Without dissociative symptoms    Psychosocial & Contextual Factors:   Multiple medical concerns and needs.  Inadequate family and professional support    Intervention:   Solution Focused Brief Therapy:    Cathartic expression of concerns   Identify the problem/concerns and needed service and interventions  Prioritize problems/services for today     Brief Psychotherapy - discussed and prioritizing the most efficient treatment. and Solution-Focused  Brief Therapy (SFBT) - Change is perpetual, focus on the problematic excerptions. Reality is subjective and social constructed through conversation.    Collateral Reports Completed:  TC Collateral: [facility] electronic medical records reviewed. and No Collateral obtained.    DATA:  Interactive Complexity: No  Crisis: No    PLAN: (Homework, other):  1. Diagnostics Assessment was completed on June 6, 2024  2.   Provider recommended the following referrals: no referrals needed.    3.   Information in this assessment was obtained from the medical record and provided by patient who is a good historian.   4.   Follow up scheduled:  1 week        Patient was given the following to do until next session:  F/U with [facility] as scheduled.  Discuss need for group with 55+ group facilitator.  Make a list of all medical care needs and begin to prioritize.  5.  Anticipated Discharge: Anticipated Discharge Time: < 1 Month   6. Is this the patient's last discharge: No      Procedure Code:  Psychotherapy (with patient) - 60 [CPT 37476]    Archana Blanton [facility]    Suicide Risk and Safety Concerns were assessed for. Patient meets the following risk assessment and triage: [place] Suicide Severity Rating Scale not completed do the following reason denied suicidal ideation/plan or intent    Answers submitted by the patient for this visit:  Patient Health Questionnaire (Submitted on 7/10/2024)  If you checked off any problems, how difficult have these problems made it for you to do your work, take care of things at home, or get along with other people?: Very difficult  PHQ9 TOTAL SCORE: 13

## 2024-07-11 ENCOUNTER — TELEPHONE (OUTPATIENT)
Dept: SLEEP MEDICINE | Facility: CLINIC | Age: 69
End: 2024-07-11
Payer: MEDICARE

## 2024-07-11 ENCOUNTER — TELEPHONE (OUTPATIENT)
Dept: BEHAVIORAL HEALTH | Facility: CLINIC | Age: 69
End: 2024-07-11
Payer: MEDICARE

## 2024-07-11 ENCOUNTER — DOCUMENTATION ONLY (OUTPATIENT)
Dept: BEHAVIORAL HEALTH | Facility: CLINIC | Age: 69
End: 2024-07-11
Payer: MEDICARE

## 2024-07-11 ENCOUNTER — VIRTUAL VISIT (OUTPATIENT)
Dept: BEHAVIORAL HEALTH | Facility: CLINIC | Age: 69
End: 2024-07-11
Payer: MEDICARE

## 2024-07-11 DIAGNOSIS — F41.1 GENERALIZED ANXIETY DISORDER: Primary | ICD-10-CM

## 2024-07-11 PROCEDURE — 99207 PR NO CHARGE LOS: CPT | Mod: 95 | Performed by: SOCIAL WORKER

## 2024-07-11 NOTE — TELEPHONE ENCOUNTER
Writer spoke with patient who is requesting 930 am appointment of 07/11/2024 to be removed. Teams message sent.    Jacqueline Givens  07/11/2024  [phone]

## 2024-07-11 NOTE — PROGRESS NOTES
7/11/2024  Patient was schedule for today at 0930.  Provider unaware with patient having been seen 7/10/2024 and discussion was for her to return next week.  Provider to contact patient today to discuss visit schedule.  Patient is scheduled for Monday 7/15/2024 as well.  ASHKAN Jurado

## 2024-07-11 NOTE — TELEPHONE ENCOUNTER
General Call      Reason for Call:  patient called and is declined a sleep study ordered by Bri Alonso due to the beds used and that she has already had a study in the past.'    Would like a callback regardless.    Thank you.    What are your questions or concerns:  no    Date of last appointment with provider: Feb 2024    Could we send this information to you in [employer] or would you prefer to receive a phone call?:   Patient would prefer a phone call   Okay to leave a detailed message?: Yes at Cell number on file:    Telephone Information:   Mobile [phone]

## 2024-07-11 NOTE — TELEPHONE ENCOUNTER
Pt called TC stated she has been on MyChart and provider has not yet connected. Coordinator connecting with provider. Patient stated would like to still see today and rather not reschedule.     Scheduled a return visit for soonest available appointment Monday with Yevgeniy. Coordinator requested therapist reach out to patient.     Cassidy Baker  Transition Clinic Coordinator  07/11/24 10:18 AM

## 2024-07-11 NOTE — TELEPHONE ENCOUNTER
Pt called to r/s. Pt was upset that they may have to wait to schedule until after their upcoming heart procedure. Due to the cardiac issues and the upcoming heart procedure, writer had RN Assessment review. Assessment stated we would need to wait to schedule until after pt's procedure on 07/30 as their is not enough information to determine scheduling. Pt was informed of determination. Pt stated they did not want to wait until October or further to be seen and that they just want the appt to be cancelled. Pt stated it was ridiculous that they could not schedule now.

## 2024-07-12 ENCOUNTER — ANESTHESIA (OUTPATIENT)
Dept: BEHAVIORAL HEALTH | Facility: CLINIC | Age: 69
End: 2024-07-12
Payer: MEDICARE

## 2024-07-12 ENCOUNTER — ANESTHESIA EVENT (OUTPATIENT)
Dept: BEHAVIORAL HEALTH | Facility: CLINIC | Age: 69
End: 2024-07-12
Payer: MEDICARE

## 2024-07-12 ENCOUNTER — HOSPITAL ENCOUNTER (OUTPATIENT)
Dept: BEHAVIORAL HEALTH | Facility: CLINIC | Age: 69
Discharge: HOME | End: 2024-07-12
Attending: PSYCHIATRY & NEUROLOGY
Payer: MEDICARE

## 2024-07-12 VITALS
TEMPERATURE: 97.7 F | DIASTOLIC BLOOD PRESSURE: 80 MMHG | RESPIRATION RATE: 20 BRPM | OXYGEN SATURATION: 94 % | SYSTOLIC BLOOD PRESSURE: 132 MMHG | HEART RATE: 90 BPM

## 2024-07-12 DIAGNOSIS — F33.2 SEVERE RECURRENT MAJOR DEPRESSION WITHOUT PSYCHOTIC FEATURES (H): Primary | ICD-10-CM

## 2024-07-12 PROCEDURE — 370N000017 HC ANESTHESIA TECHNICAL FEE, PER MIN: Performed by: ANESTHESIOLOGY

## 2024-07-12 PROCEDURE — 90870 ELECTROCONVULSIVE THERAPY: CPT | Performed by: ANESTHESIOLOGY

## 2024-07-12 PROCEDURE — 90870 ELECTROCONVULSIVE THERAPY: CPT

## 2024-07-12 PROCEDURE — 250N000009 HC RX 250: Performed by: ANESTHESIOLOGY

## 2024-07-12 PROCEDURE — 250N000011 HC RX IP 250 OP 636: Performed by: ANESTHESIOLOGY

## 2024-07-12 PROCEDURE — 90870 ELECTROCONVULSIVE THERAPY: CPT | Performed by: PSYCHIATRY & NEUROLOGY

## 2024-07-12 PROCEDURE — 90870 ELECTROCONVULSIVE THERAPY: CPT | Performed by: NURSE ANESTHETIST, CERTIFIED REGISTERED

## 2024-07-12 PROCEDURE — 250N000011 HC RX IP 250 OP 636: Performed by: PSYCHIATRY & NEUROLOGY

## 2024-07-12 RX ORDER — FENTANYL CITRATE 50 UG/ML
25 INJECTION, SOLUTION INTRAMUSCULAR; INTRAVENOUS EVERY 5 MIN PRN
Status: DISCONTINUED | OUTPATIENT
Start: 2024-07-12 | End: 2024-07-13 | Stop reason: HOSPADM

## 2024-07-12 RX ORDER — KETOROLAC TROMETHAMINE 30 MG/ML
30 INJECTION, SOLUTION INTRAMUSCULAR; INTRAVENOUS ONCE
Status: COMPLETED | OUTPATIENT
Start: 2024-07-12 | End: 2024-07-12

## 2024-07-12 RX ORDER — ACETAMINOPHEN 325 MG/1
975 TABLET ORAL ONCE
Status: DISCONTINUED | OUTPATIENT
Start: 2024-07-12 | End: 2024-07-13 | Stop reason: HOSPADM

## 2024-07-12 RX ORDER — METHOHEXITAL IN WATER/PF 100MG/10ML
SYRINGE (ML) INTRAVENOUS PRN
Status: DISCONTINUED | OUTPATIENT
Start: 2024-07-12 | End: 2024-07-12

## 2024-07-12 RX ORDER — ONDANSETRON 2 MG/ML
4 INJECTION INTRAMUSCULAR; INTRAVENOUS EVERY 30 MIN PRN
Status: DISCONTINUED | OUTPATIENT
Start: 2024-07-12 | End: 2024-07-13 | Stop reason: HOSPADM

## 2024-07-12 RX ORDER — HYDROMORPHONE HCL IN WATER/PF 6 MG/30 ML
0.2 PATIENT CONTROLLED ANALGESIA SYRINGE INTRAVENOUS EVERY 5 MIN PRN
Status: DISCONTINUED | OUTPATIENT
Start: 2024-07-12 | End: 2024-07-13 | Stop reason: HOSPADM

## 2024-07-12 RX ORDER — LABETALOL HYDROCHLORIDE 5 MG/ML
10 INJECTION, SOLUTION INTRAVENOUS
Status: DISCONTINUED | OUTPATIENT
Start: 2024-07-12 | End: 2024-07-13 | Stop reason: HOSPADM

## 2024-07-12 RX ORDER — ONDANSETRON 4 MG/1
4 TABLET, ORALLY DISINTEGRATING ORAL EVERY 30 MIN PRN
Status: DISCONTINUED | OUTPATIENT
Start: 2024-07-12 | End: 2024-07-13 | Stop reason: HOSPADM

## 2024-07-12 RX ORDER — NALOXONE HYDROCHLORIDE 0.4 MG/ML
0.1 INJECTION, SOLUTION INTRAMUSCULAR; INTRAVENOUS; SUBCUTANEOUS
Status: DISCONTINUED | OUTPATIENT
Start: 2024-07-12 | End: 2024-07-13 | Stop reason: HOSPADM

## 2024-07-12 RX ORDER — DEXAMETHASONE SODIUM PHOSPHATE 4 MG/ML
4 INJECTION, SOLUTION INTRA-ARTICULAR; INTRALESIONAL; INTRAMUSCULAR; INTRAVENOUS; SOFT TISSUE
Status: DISCONTINUED | OUTPATIENT
Start: 2024-07-12 | End: 2024-07-13 | Stop reason: HOSPADM

## 2024-07-12 RX ORDER — SODIUM CHLORIDE, SODIUM LACTATE, POTASSIUM CHLORIDE, CALCIUM CHLORIDE 600; 310; 30; 20 MG/100ML; MG/100ML; MG/100ML; MG/100ML
INJECTION, SOLUTION INTRAVENOUS CONTINUOUS
Status: DISCONTINUED | OUTPATIENT
Start: 2024-07-12 | End: 2024-07-13 | Stop reason: HOSPADM

## 2024-07-12 RX ORDER — HYDROMORPHONE HCL IN WATER/PF 6 MG/30 ML
0.4 PATIENT CONTROLLED ANALGESIA SYRINGE INTRAVENOUS EVERY 5 MIN PRN
Status: DISCONTINUED | OUTPATIENT
Start: 2024-07-12 | End: 2024-07-13 | Stop reason: HOSPADM

## 2024-07-12 RX ORDER — FENTANYL CITRATE 50 UG/ML
50 INJECTION, SOLUTION INTRAMUSCULAR; INTRAVENOUS EVERY 5 MIN PRN
Status: DISCONTINUED | OUTPATIENT
Start: 2024-07-12 | End: 2024-07-13 | Stop reason: HOSPADM

## 2024-07-12 RX ORDER — SODIUM CHLORIDE, SODIUM LACTATE, POTASSIUM CHLORIDE, CALCIUM CHLORIDE 600; 310; 30; 20 MG/100ML; MG/100ML; MG/100ML; MG/100ML
INJECTION, SOLUTION INTRAVENOUS CONTINUOUS
Status: DISCONTINUED | OUTPATIENT
Start: 2024-07-12 | End: 2024-07-12

## 2024-07-12 RX ORDER — KETOROLAC TROMETHAMINE 30 MG/ML
30 INJECTION, SOLUTION INTRAMUSCULAR; INTRAVENOUS ONCE
Status: CANCELLED
Start: 2024-07-12 | End: 2024-07-12

## 2024-07-12 RX ADMIN — KETOROLAC TROMETHAMINE 30 MG: 30 INJECTION, SOLUTION INTRAMUSCULAR at 08:57

## 2024-07-12 RX ADMIN — Medication 100 MG: at 09:17

## 2024-07-12 RX ADMIN — SUCCINYLCHOLINE CHLORIDE 90 MG: 20 INJECTION, SOLUTION INTRAMUSCULAR; INTRAVENOUS; PARENTERAL at 09:17

## 2024-07-12 ASSESSMENT — COPD QUESTIONNAIRES: COPD: 1

## 2024-07-12 NOTE — PROCEDURES
"Procedures  [facility], [place]   ECT Procedure Note   07/12/2024    Randi Cleary is a 68 year old female patient.  [id number]    Patient Status: outpatient    Is this the first in a series of 12 treatments?  No      Allergies   Allergen Reactions    Serotonin Reuptake Inhibitors (Ssris) Anxiety, Difficulty breathing, Headache, Palpitations and Shortness Of Breath    Buspirone      The patient states she had serotonin syndrome    Cephalexin      Other reaction(s): unknown rxn.    Desvenlafaxine      Serotonin syndrome    Diclofenac Sodium [Diclofenac]      Serotonin syndrome and restless legs syndrome    Gabapentin      Drove on the wrong side of the highway    Levofloxacin      \"CAN'T REMEMBER\"    Penicillins      \"SORES IN MOUTH\"    Riluzole Difficulty breathing and Swelling    Sulfa Antibiotics      \"PT DOES NOT KNOW WHAT THE REACTION WAS\"    Topiramate Other (See Comments)     Frequent urination       Weight:  0 lbs 0 oz / 0 kg          Indications for ECT:   Medications ineffective and Psychotherapies ineffective         Clinical Narrative:   HPI - The patient describes a lifelong history of depression dating back to elementary school, worsening after her father's death when she was 15yo and especially in the 1980s in the setting of worsening physical health (since falling and breaking her ankle), which led to the the initiation of fluoxetine, her first antidepressant.  Her history has been primarily characterized by low mood, with some ups and downs but no extended depression-free periods since then.  She has tried many different medications from different classes, but cannot recall any one being particularly helpful for her.  She has experienced past episodes of particularly irritable mood and concomitant decreased sleep need, although most of these have lasted 1-2 days and triggered by anger related to external stressors.  She has at least one episode of a more extended " "episode of elevated mood (and associated grandiosity, increased spending, flight of ideas, increased goal directed behaviors, pressured speech, and odd and embarrassing behavior), which occurred in the context of relapse on alcohol in 2021. She does not endorse any events before about age 48.     The patient's depression is characterized by near daily low mood, frequent anhedonia, with sleep difficulties (although c/b pain, KYAW, and RLS), fatigue, feelings of guilt/worthlessness, and impaired concentration.  She reports feelings of \"despair,\" at times with active SI with plan, but denies any intent to act on this - \"maybe it's hope.\"  She has 1 lifetime suicide attempt (overdose) in the 1980s, for which she was psychiatrically hospitalized.  She has a remote history of SIB (cutting) but not recently.       Psych pertinent item history includes includes suicide attempt , suicidal ideation, SIB , aggression, trauma hx, substance use: alcohol, cannabis, and Patient has a history of alcohol dependence treated 20+ years ago and she relapsed in 2020 for a couple of months, in her 20s she\" loved getting high\" on cocaine, LSD, mushrooms, speed, white cross, mutiple psychotropic trials , psych hosp, ketamine, and major medical problems.    Target Symptoms for Improvement: Amotivation, Fatigue, Improved self-image and Panic attacks         Diagnosis:   Major depression         Assessment:   #1 05/24/24 Some circumstantial thought, needs some redirection, 3.5 hours sleep last night, anxious, mood 1/10, PDW but no SI, never had ECT before - only TMS. No AVH.   #2 05/29/24  Mood 4/10, better over w/e, some word find difficulties, no AVH/SI/PDW. Chronic sciatica pain, neck and shoulder pain - didn't worsen with ECT. Mild headache.   #3 05/31/24  Mood 4/10, No SI, PDW, AVH, some wrist pain and headache, memory might be improving.    #4 6/3/24  Phq-9= 13, mood 3/10.  Yesterday was very tearful, still a bit today.  Last treatment " "had awareness under paralysis.  Otherwise, some initial confusion after first ECT but no subsequent cognitive effects.  Signed consent to continue.  #5 6/5/24 Mood 4-5/10  She feels like her \"rage\" is less and she feels lighter. but no cognitive side effects. She complains of excessive sweating and is concerned her thryoid is unbalanced. She is somatically focused She reports pain on the side of her neck radiating down to her chest. She had a migraine she thinks over the weekend which usual starts as occipital tension.  #6 6/7/24 mood 4-5/10. No SI. She does have some baseline long term memory difficulties no AVH.   #7 6/10/24  She still is worried that She is not able to go home. She had visitors this weekend who said \"you don't seem to have the weight of the world on your shoulders anymore\" she disagrees she had a downward spiral over the weekend when there was a mix up with her clothes and she usually feels tearful after ECT. She does not report anything feeling worse. She gets down on herself when she has an anxiety spiral and it was the 1st one she has had since admission.   #8 6/12/24 Winnie reported some tearfulness overnight. She is noticing a weight lifting mood 6/10 . Reports some difficulty with remembering names but believes this is at baseline.   #9 6/14/24 Mood 5/10 (10 best) She feels like she is improving each time . She still has occasional crying spells but she feels she bounces back quicker. She is still anxious about going home. No Si. Tolerating ECT  #10 06/17/24 mood 5/10 She does feellike she has gotten some improvement since starting Ect but still has times where she gets tearful and anxious \"goes down the rabit hole\" but not as often . She has had ketamine troches before with pain  management to no effect but wonders about ketamine infusions.  #11 06/19/24 Mood today is 5/10. Patient is wondering whether she could do a ketamine course (did have ketamine in the past), despite of being on " "opioids. Feels that ketamine can help with \"anger, tearing and anxiety.\" She complains to the anesthesiologist about recent urinary incontinence which needs to be considered when  using ketamine. She wants to try it for anger ,tearing and anxiety which is not a standard indication  #12 06/21/24 Mood 5/10, no PDW/SI, but some anxiety around pain this AM.  Patient is interested in maintenance ECT at her attending psychiatrist's recommendation to prevent the possibility of her mood dipping as low as it did previously that led to her hospitalization.  Discussed pros and cons of maintenance ECT, suggested alternative of maintenance medications/therapy and/or watchful waiting with possibility of retreatment should she relapse, as well as alternate interventions including ketamine.  At the moment, she is most interested in pursuing maintenance ECT - will plan for next Friday pending confirmation with her family that she has post-ECT ride and monitoring.  M 06/28/24 Mood ups and downs, irritability, anxiety, sadness switching multiple times a day since being discharged home.  Had difficulty with the transition home, was harder than she thought it would be.  However, still able to \"push away\" PDW/SI, and did not have periods of persistently low mood this past week.  Has noticed some anterograde and retrograde amnesia of the 1-2 months prior to starting ECT.  Will continue to track.  Today, mood 6/10 \"now that I'm at ECT.\"  M 07/05/24 She was tearful, states that she doesn't feel good, \"starting to drop\", states that the mood change happened in Wednesday somewhat suddenly, when she encountered several business stressors and felt overwhelmed. Mood 3-4/10. Denies SI and feels safe at home. Still reports memory issues. Reports that the day program has been overwhelming.    She cancelled her colonoscopy in order to focus on her right heart cath the next week. She feels like she has too many procedures scheduled and pushed off her " sleep study also.  M 7/12/24 Doing well.  Somewhat stressed witht all the medical appointments she has to coordinate. Her colonoscopy got cancelled because of her upcoming appointment with cardiology. Canceled the outpatient program but interested in doing the group therapy at [facility].          Pause for the Cause:     Correct patient Yes   Correct procedure/laterality settings: Yes           Intra-Procedure Documentation:     ECT #: 15   Treatment number this series: 15   Total treatment number: 15     Type of ECT:  Right, unilateral ultrabrief    ECT Medications:    Toradol 30mg iv - for headache/myalgia     Brevital: 100 mg (incr from 90 mg as still awake)   Succinyl Choline: 90 mg    BP - nicardipine 1500 mcg       ECT Strip Summary: RUL Titration #3: 38.4 mC   Energy Level:  384.0mC, 0.3 ms, 100 Hz, 8 sec, 800 mA     Motor Seizure Duration: 23 seconds  EEG Seizure Duration: 32 seconds      Complications: none    Plan:   - Plan for maintenance Q1wk for 1 time then move to [id number]  - Monitor depression severity with clinical assessment augmented with PHQ9 every other treatment  - Continue current medications    Discharge instructions:    -- Remember to NOT take gabapentin after 6pm the night before each treatment  -- You will have to check to make sure somebody can drive you to and from the hospital and monitor you for 6 hours after each treatment  -- Maintenance ECT starts weekly for several weeks, then goes down to every two weeks, then to once every month.  The goal of maintenance ECT is to space out and eventually stop  -- You cannot drive for the next week.  Then, during maintenance, you can't drive for 24 hours after each treatment.  - The 55+ intensive outpatient program is an excellent idea, and I think will be more likely to help the daily ups and downs of your mood that are still present  -   - We discussed other alternatives including trying ketamine through the [facility] or staying on your  regular medications and therapy, but at the moment you were most interested in pursuing maintenance    - Per Dr Varela:   - Consider trial of serotonin modulator (e.g., vilazodone vs. vortioxetine) or novel agent Auvelity  - Consider augmentation with atypical antipsychotic (e.g., quetiapine or aripiprazole), though there are concerns given pre-diabetes     Assited by Dr Leonard Hutchison MD  Department of Psychiatry and Behavioral Sciences

## 2024-07-12 NOTE — ANESTHESIA POSTPROCEDURE EVALUATION
Patient: Randi Cleary    Procedure: * No procedures listed *  Electroconvulsive Therapy    Anesthesia Type:  General    Note:  Disposition: Outpatient   Postop Pain Control: Uneventful            Sign Out: Well controlled pain   PONV: No   Neuro/Psych: Uneventful            Sign Out: Acceptable/Baseline neuro status   Airway/Respiratory: Uneventful            Sign Out: Acceptable/Baseline resp. status   CV/Hemodynamics: Uneventful            Sign Out: Acceptable CV status; No obvious hypovolemia; No obvious fluid overload   Other NRE: NONE   DID A NON-ROUTINE EVENT OCCUR? No       Last vitals:  Vitals:    07/12/24 0927 07/12/24 0937 07/12/24 0952   BP: (!) 148/79 (!) 145/92 132/80   Pulse: 85 90 90   Resp: 20 20 20   Temp: 36.6  C (97.8  F) 36.5  C (97.7  F) 36.5  C (97.7  F)   SpO2: 95% 96% 94%       Electronically Signed By: Jocelyn Hunter MD  July 12, 2024  10:26 AM

## 2024-07-12 NOTE — TELEPHONE ENCOUNTER
Message left requesting return call to nurse.    Eli SIMENTAL RN  [facility]

## 2024-07-12 NOTE — ANESTHESIA CARE TRANSFER NOTE
Patient: Randi Cleary    Procedure: * No procedures listed *  Electroconvulsive Therapy    Diagnosis: * No pre-op diagnosis entered *  Diagnosis Additional Information: No value filed.    Anesthesia Type:   General     Note:    Oropharynx: oropharynx clear of all foreign objects and spontaneously breathing  Level of Consciousness: drowsy  Oxygen Supplementation: room air    Independent Airway: airway patency satisfactory and stable  Dentition: dentition unchanged  Vital Signs Stable: post-procedure vital signs reviewed and stable  Report to RN Given: handoff report given  Patient transferred to: PACU    Handoff Report: Identifed the Patient, Identified the Reponsible Provider, Reviewed the pertinent medical history, Discussed the surgical course, Reviewed Intra-OP anesthesia mangement and issues during anesthesia, Set expectations for post-procedure period and Allowed opportunity for questions and acknowledgement of understanding  Vitals:  Vitals Value Taken Time   BP     Temp     Pulse     Resp     SpO2         Electronically Signed By: Jocelyn Hunter MD  July 12, 2024  9:29 AM

## 2024-07-12 NOTE — ANESTHESIA PREPROCEDURE EVALUATION
Anesthesia Pre-Procedure Evaluation    Patient: Randi Cleary   MRN: [id number] : 1955        Procedure : * No procedures listed *          Past Medical History:   Diagnosis Date     Bipolar 2 disorder (H)      Breast cancer (H)     lumpectomy, radiation, chemo     Chronic pain syndrome      COPD (chronic obstructive pulmonary disease) (H)     asthma     Cord compression (H) 2021     Dizzy      Drug tolerance     opioid     Esophageal reflux      Fatigue      Generalized anxiety disorder      Graves disease      Hemochromatosis 2018    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; H63D not detected     History of breast cancer 2020    Formatting of this note might be different from the original. Created by Conversion  Replacement Utility updated for latest IMO load Formatting of this note might be different from the original. Created by Conversion  Replacement Utility updated for latest IMO load     History of corticosteroid therapy 2019     History of partial adrenalectomy (H24) 2019     History of pheochromocytoma 2019     Hx antineoplastic chemotherapy      Hx of radiation therapy      Hyperlipidaemia      Hypertension      Impaired fasting glucose      Injury of neck, whiplash 07/15/2021     Joint pain      KYAW (obstructive sleep apnea) 2016     Osteopenia      Pheochromocytoma, left 2017    laparoscopically removed     Postablative hypothyroidism 1995     Prediabetes 10/03/2019    by A1c     Psoriasis      Psoriatic arthropathy (H)      Right rotator cuff tear      RLS (restless legs syndrome)     on ropinorole     Sacroiliitis (H24)      Serotonin syndrome 2020    [facility] - While on desvenlafaxine 100mg     Snoring      Spinal stenosis      Status post coronary angiogram 10/03/2019     Urinary incontinence      Vitamin B 12 deficiency 2009     Vitamin D deficiency 2010      Past Surgical History:   Procedure Laterality Date     ARTHRODESIS ANKLE        ARTHROPLASTY ANKLE Right 6/29/2015    Procedure: ARTHROPLASTY ANKLE;  Surgeon: Jason Coughlin MD;  Location: [facility]     ARTHROPLASTY REVISION ANKLE Right 6/29/2015    Procedure: ARTHROPLASTY REVISION ANKLE;  Surgeon: Jason Coughlin MD;  Location: [facility]     BIOPSY BREAST       BREAST BIOPSY, CORE RT/LT       COLONOSCOPY       COLONOSCOPY N/A 2/25/2021    Procedure: COLONOSCOPY;  Surgeon: Guru Elke Tolbert MD;  Location: [facility]     CV CORONARY ANGIOGRAM N/A 10/3/2019    Procedure: CV CORONARY ANGIOGRAM;  Surgeon: Bryce Pierre MD;  Location:  HEART CARDIAC CATH LAB     CV RIGHT HEART CATH MEASUREMENTS RECORDED N/A 10/3/2019    Procedure: CV RIGHT HEART CATH;  Surgeon: Bryec Pierre MD;  Location:  HEART CARDIAC CATH LAB     ESOPHAGOSCOPY, GASTROSCOPY, DUODENOSCOPY (EGD), COMBINED N/A 2/25/2021    Procedure: ESOPHAGOGASTRODUODENOSCOPY, WITH BIOPSY;  Surgeon: Guru Elke Tolbert MD;  Location:  GI     EYE SURGERY  2021     HC REMOVE TONSILS/ADENOIDS,<13 Y/O      Description: Tonsillectomy With Adenoidectomy;  Recorded: 04/07/2010;     IR LUMBAR EPIDURAL STEROID INJECTION  10/26/2004     IR LUMBAR EPIDURAL STEROID INJECTION  11/16/2004     IR LUMBAR EPIDURAL STEROID INJECTION  12/21/2004     IR LUMBAR EPIDURAL STEROID INJECTION  6/8/2006     JOINT REPLACEMENT       LAMINOPLASTY CERVICAL POSTERIOR THREE+ LEVELS Left 12/21/2021    Procedure: CERVICAL 3-CERVICAL 6 LEFT OPEN DOOR LAMINOPLASTY AND LEFT CERVICAL 4-5 AND CERVICAL 6-7 POSTERIOR FORAMINOTOMY;  Surgeon: Angela Gregory MD;  Location: [facility] OR     LAPAROSCOPIC ADRENALECTOMY Left 08/02/2017    pheochromocytoma     LAPAROSCOPIC ADRENALECTOMY Left 8/2/2017    Procedure: LAPAROSCOPIC LEFT ADRENALECTOMY, ;  Surgeon: Gab Linares MD;  Location: [facility] OR;  Service:      LENGTHEN TENDON ACHILLES Right 6/29/2015    Procedure: LENGTHEN TENDON ACHILLES;  Surgeon: Ced  "Jason CORNEJO MD;  Location: [facility]     LUMPECTOMY BREAST       LUMPECTOMY BREAST Left 1994     MAMMOPLASTY REDUCTION Right 2015    [place]     MAMMOPLASTY REDUCTION Right     approx late      MASTECTOMY      left lumpectomy with axillary node dissection     MASTECTOMY MODIFIED RADICAL       OTHER SURGICAL HISTORY Right     reconstructive breast surgery     OTHER SURGICAL HISTORY      Adrenalectomy for pheochromocytoma     MO MASTECTOMY, MODIFIED RADICAL      Description: Modified Radical Mastectomy Left Breast;  Recorded: 2010;     REPAIR HAMMER TOE Right 2015    Procedure: REPAIR HAMMER TOE;  Surgeon: Jason Coughlin MD;  Location: [facility]     TONSILLECTOMY       TONSILLECTOMY & ADENOIDECTOMY       ZZC ARTHRODESIS,ANKLE,OPEN Right     Description: Ankle Arthrodesis;  Recorded: 2010;      Allergies   Allergen Reactions     Serotonin Reuptake Inhibitors (Ssris) Anxiety, Difficulty breathing, Headache, Palpitations and Shortness Of Breath     Buspirone      The patient states she had serotonin syndrome     Cephalexin      Other reaction(s): unknown rxn.     Desvenlafaxine      Serotonin syndrome     Diclofenac Sodium [Diclofenac]      Serotonin syndrome and restless legs syndrome     Gabapentin      Drove on the wrong side of the highway     Levofloxacin      \"CAN'T REMEMBER\"     Penicillins      \"SORES IN MOUTH\"     Riluzole Difficulty breathing and Swelling     Sulfa Antibiotics      \"PT DOES NOT KNOW WHAT THE REACTION WAS\"     Topiramate Other (See Comments)     Frequent urination      Social History     Tobacco Use     Smoking status: Former     Current packs/day: 0.00     Average packs/day: 2.5 packs/day for 29.2 years (72.9 ttl pk-yrs)     Types: Cigarettes     Start date: 1971     Quit date: 2000     Years since quittin.0     Passive exposure: Past     Smokeless tobacco: Never   Substance Use Topics     Alcohol use: Not Currently     Comment: relapse 2021 " sober       Wt Readings from Last 1 Encounters:   07/02/24 88 kg (194 lb)        Anesthesia Evaluation            ROS/MED HX  ENT/Pulmonary:     (+) sleep apnea,                         COPD,              Neurologic:       Cardiovascular:     (+)  hypertension- -   -  - -                                      METS/Exercise Tolerance:     Hematologic:       Musculoskeletal:       GI/Hepatic:     (+) GERD,                   Renal/Genitourinary:       Endo:     (+)  type II DM,        thyroid problem,     Obesity,       Psychiatric/Substance Use:       Infectious Disease:       Malignancy:       Other:            Physical Exam    Airway        Mallampati: III   TM distance: > 3 FB   Neck ROM: full   Mouth opening: > 3 cm    Respiratory Devices and Support         Dental       (+) Modest Abnormalities - crowns, retainers, 1 or 2 missing teeth      Cardiovascular   cardiovascular exam normal       Rhythm and rate: regular     Pulmonary   pulmonary exam normal              OUTSIDE LABS:  CBC:   Lab Results   Component Value Date    WBC 7.9 06/04/2024    WBC 7.1 05/22/2024    HGB 17.0 (H) 06/04/2024    HGB 17.7 (H) 05/22/2024    HCT 48.1 (H) 06/04/2024    HCT 49.9 (H) 05/22/2024     06/04/2024     05/22/2024     BMP:   Lab Results   Component Value Date     06/04/2024     05/22/2024    POTASSIUM 4.3 06/04/2024    POTASSIUM 5.2 06/04/2024    CHLORIDE 103 06/04/2024    CHLORIDE 104 05/22/2024    CO2 21 (L) 06/04/2024    CO2 24 05/22/2024    BUN 17.3 06/04/2024    BUN 9.3 05/22/2024    CR 0.53 06/04/2024    CR 0.57 05/22/2024    GLC 96 06/04/2024     (H) 05/22/2024     COAGS:   Lab Results   Component Value Date    PTT 34 12/13/2021    INR 0.94 12/13/2021     POC:   Lab Results   Component Value Date     (H) 02/25/2021     HEPATIC:   Lab Results   Component Value Date    ALBUMIN 3.7 06/04/2024    PROTTOTAL 7.2 06/04/2024    ALT 33 06/04/2024    AST 35 06/04/2024    ALKPHOS 80 06/04/2024  "   BILITOTAL 0.4 06/04/2024     OTHER:   Lab Results   Component Value Date    A1C 5.6 05/22/2024    LINDA 9.5 06/04/2024    MAG 1.9 05/22/2024    TSH 1.69 06/04/2024    T4 1.49 03/29/2024    T3 114 01/18/2023    CRP <2.9 11/16/2021    SED 8 10/17/2023       Anesthesia Plan    ASA Status:  3       Anesthesia Type: General.   Induction: Intravenous.           Consents            Postoperative Care            Comments:               Jocelyn Hunter MD    I have reviewed the pertinent notes and labs in the chart from the past 30 days and (re)examined the patient.  Any updates or changes from those notes are reflected in this note.             # Obesity: Estimated body mass index is 31.31 kg/m  as calculated from the following:    Height as of 7/2/24: 1.676 m (5' 6\").    Weight as of 7/2/24: 88 kg (194 lb).      "

## 2024-07-12 NOTE — PROGRESS NOTES
Patients VSS, A/O, IV removed, meets phase 2 criteria and is able to move to discharge at this time.

## 2024-07-12 NOTE — DISCHARGE INSTRUCTIONS
ECT Discharge Instructions      After each ECT treatment:    Get plenty of rest. A responsible adult must stay with your for at least 6 hours.  Avoid heavy or risky activities for 24 hours while in maintenance ECT.   Do not drive for at least 24 hours after your treatment while in maintenance ECT.   If you have more than one treatment within one week, do not drive for 7 days after your last treatment.   If you feel light-headed, sit for a few minutes before standing. Have someone help you get up.  If you have nausea (feel sick to your stomach): Drink only clear liquids such as apple juice, ginger ale, broth or 7UP, Be sure to drink plenty of liquids. Move to a normal diet as you feel able.   If you received Toradol, wait 6 hours before taking ibuprofen.  Call your doctor if:   You have a fever over 100F (37.7 C) (taken under the tongue), or a fever that last more than 24 hours.  Your IV site is red, swollen, very painful or is getting more tender.  You have nausea that gets very bad or does not improve.    If you have any symptoms after ECT, tell our staff before your next treatment.  The ECT Department can be reached at [phone].  The ECT Department is open Mondays, Wednesdays and Fridays from 7:00 AM to 2:00 PM.    To speak to a doctor, call:  Your primary care provider or [facility] for Dr. Beavers, Dr. Hutchison or Dr. Cotter PHONE: [phone]; fax: [phone]    New instructions:    You have received Toradol today at 8:57 am. Toradol is an NSAID.  Do not take any NSAID's including: Ibuprofen, Naproxen Sodium, Asprin, Advil, Motrin, Aleve, Shannan, etc., until six hours after the above time which would be 2:57 pm. If you have any questions check back with your Physician, or pharmacist.     - Plan for maintenance Q1wk for 1 time then move to [id number]  - Monitor depression severity with clinical assessment augmented with PHQ9 every other treatment  - Continue current medications                 --  Remember to NOT take gabapentin after 6pm the night before each treatment  -- You will have to check to make sure somebody can drive you to and from the hospital and monitor you for 6 hours after each treatment  -- Maintenance ECT starts weekly for several weeks, then goes down to every two weeks, then to once every month.  The goal of maintenance ECT is to space out and eventually stop  -- You cannot drive for the next week.  Then, during maintenance, you can't drive for 24 hours after each treatment.  - The 55+ intensive outpatient program is an excellent idea, and I think will be more likely to help the daily ups and downs of your mood that are still present  -   - We discussed other alternatives including trying ketamine through the [facility] or staying on your regular medications and therapy, but at the moment you were most interested in pursuing maintenance    - Per Dr Varela:   - Consider trial of serotonin modulator (e.g., vilazodone vs. vortioxetine) or novel agent Auvelity  - Consider augmentation with atypical antipsychotic (e.g., quetiapine or aripiprazole), though there are concerns given pre-diabetes    Covid-19 Testing:  We are no longer requiring covid tests prior to your procedure. If you are ill, please call the ECT department to discuss plan for rescheduling your appointment. [phone]    Please come to the [facility] and check-in with Security before your ECT appointment.  The [facility] is located right next to the Adult Emergency Room.  The address is [address] [place] [address].    After your appointment, you may be picked up at the [facility] entrance, which is located at [address] [place] [address].  [facility], about 1 block North of the [facility].    Transported by:   ***    Reviewed AVS discharge instructions with:   ***

## 2024-07-15 ENCOUNTER — VIRTUAL VISIT (OUTPATIENT)
Dept: BEHAVIORAL HEALTH | Facility: CLINIC | Age: 69
End: 2024-07-15
Payer: MEDICARE

## 2024-07-15 DIAGNOSIS — F33.2 SEVERE RECURRENT MAJOR DEPRESSION WITHOUT PSYCHOTIC FEATURES (H): Primary | ICD-10-CM

## 2024-07-15 DIAGNOSIS — F41.1 GENERALIZED ANXIETY DISORDER: ICD-10-CM

## 2024-07-15 PROCEDURE — 99207 PR NO CHARGE LOS: CPT | Mod: 95 | Performed by: SOCIAL WORKER

## 2024-07-15 NOTE — PROGRESS NOTES
Transition Clinic Progress Note    Patient Name:   Randi Cleary Date: July 15, 2024          Service Type: Individual      VISIT TIME START:       VISIT TIME END: 6      Session Length:  TC Session Length: 45 (38-52 Minutes)    Session #:  2    Attendees: TC Attendees: Client alone    Service Modality:  Service Modality: Video Visit:      Provider verified identity through the following two step process.  Patient provided:  Patient  and Patient address    Telemedicine Visit: The patient's condition can be safely assessed and treated via synchronous audio and visual telemedicine encounter.      Reason for Telemedicine Visit: Patient has requested telehealth visit    Originating Site (Patient Location): Patient's home    Distant Site (Provider Location): Provider Remote Setting- Home Office    Consent:  The patient/guardian has verbally consented to: the potential risks and benefits of telemedicine (video visit) versus in person care; bill my insurance or make self-payment for services provided; and responsibility for payment of non-covered services.     Patient would like the video invitation sent by:  My Chart    Mode of Communication:  Video Conference via [facility]    Distant Location (Provider):  Off-site    As the provider I attest to compliance with applicable laws and regulations related to telemedicine.    Informed Consent and Assessment Methods    Provided at intake      DATA  Interactive Complexity: No  Crisis: No        Progress Since Last Session (Related to Symptoms / Goals / Homework):   Symptoms: Improving reports she feels brighter.  She reports she engaged in a lunch date.  Homework:  follow up with referral from psychiatry      Episode of Care Goals: Satisfactory progress - ACTION (Actively working towards change); Intervened by reinforcing change plan / affirming steps taken     Current / Ongoing Stressors and Concerns:  Improved mood.  Reached out to a new acquaintance and had  lunch.  Will decrease ECT to 1x/2 wks .  Has foggy bran with memory loss most likely from the ECT.   Discussed this may improve in time.  Recommended she use a paper calendar with her difficulty accessing info on line.  Patient requested a return visit with this provider.  Discussed that is agreeable for now but will need to refer to community  for long term therapy.     Treatment Objective(s) Addressed in This Session:   identify 2 fears / thoughts that contribute to feeling anxious  Decrease frequency and intensity of feeling down, depressed, hopeless  Identify negative self-talk and behaviors: challenge core beliefs, myths, and actions  Improve concentration, focus, and mindfulness in daily activities   Continue to engage in enjoyable activities.      Intervention:   Solution Focused Brief Therapy:    Brief Psychotherapy - discussed and prioritizing the most efficient treatment. and Interpersonal Psychotherapy (IPT) - Unresolved grief, interpersonal disputes, role transitions, skill deficits.    Assessments completed prior to visit:  The following assessments were completed by patient for this visit:  PHQ9:       4/29/2024    11:22 AM 4/30/2024     9:38 AM 5/20/2024     8:42 AM 6/6/2024     8:00 AM 6/27/2024    12:47 PM 7/2/2024     8:23 AM 7/10/2024     9:24 AM   PHQ-9 SCORE   PHQ-9 Total Score MyChart  17 (Moderately severe depression) 15 (Moderately severe depression)  12 (Moderate depression) 13 (Moderate depression) 13 (Moderate depression)   PHQ-9 Total Score 11 17    17 15    15 16 12 13 13    13    13     GAD7:       3/5/2024     3:08 PM 4/4/2024     8:52 AM 4/18/2024    12:34 PM 5/15/2024    11:25 AM 6/6/2024     8:00 AM 6/27/2024    12:48 PM 7/1/2024    12:10 PM   CHAVO-7 SCORE   Total Score 13 (moderate anxiety) 13 (moderate anxiety) 17 (severe anxiety) 9 (mild anxiety)  11 (moderate anxiety)    Total Score 13 13    13    13    13 17    17 9    9    9    9 9 11    11    11    11    11 11     CAGE-AID:        6/22/2020     7:12 PM 1/18/2022    11:15 PM 6/6/2024     8:00 AM   CAGE-AID Total Score   Total Score 4 4 4   Total Score MyChart 4 (A total score of 2 or greater is considered clinically significant) 4 (A total score of 2 or greater is considered clinically significant)      PROMIS 10-Global Health (only subscores and total score):       12/1/2023    11:52 AM 1/22/2024    12:00 PM 5/1/2024    11:53 AM 5/15/2024    11:27 AM 6/6/2024     8:00 AM 7/1/2024    12:10 PM 7/10/2024     9:53 AM   PROMIS-10 Scores Only   Global Mental Health Score 5 5 7    7    7 7    7 8 8 9    9    9    9    9    9   Global Physical Health Score 8 9 9    9    9 8    8 8 11 11    11    11    11    11    11   PROMIS TOTAL - SUBSCORES 13 14 16    16    16 15    15 16 19 20    20    20    20    20    20     [place] Suicide Severity Rating Scale (Short Version)      8/12/2020     3:00 PM 12/21/2021    11:31 AM 1/9/2023     1:00 PM 5/21/2024     4:49 PM 5/21/2024     9:00 PM 6/6/2024     8:00 AM 7/1/2024    12:00 PM   [place] Suicide Severity Rating (Short Version)   Over the past 2 weeks have you felt down, depressed, or hopeless? yes yes        Over the past 2 weeks have you had thoughts of killing yourself? no no        Have you ever attempted to kill yourself? no no        Q1 Wished to be Dead (Past Month)   yes 1-->yes 1-->yes 1-->yes    Q2 Suicidal Thoughts (Past Month)   no 1-->yes 1-->yes 1-->yes    Q3 Suicidal Thought Method   no 0-->no 0-->no 1-->yes    Q4 Suicidal Intent without Specific Plan   no 1-->yes 0-->no 0-->no    Q5 Suicide Intent with Specific Plan   no 0-->no 0-->no 1-->yes    Q6 Suicide Behavior (Lifetime)   yes 1-->yes 0-->no 1-->yes    If yes to Q6, within past 3 months?   no 1-->yes 0-->no 0-->no    Level of Risk per Screen   moderate risk high risk moderate risk high risk    1. Wish to be Dead (Since Last Contact)       Y   2. Non-Specific Active Suicidal Thoughts (Since Last Contact)       Y   Calculated  C-SSRS Risk Score (Since Last Contact)       Low Risk         ASSESSMENT: Current Emotional / Mental Status (status of significant symptoms):   Risk status (Self / Other harm or suicidal ideation)   Patient denies current fears or concerns for personal safety.   Patient denies current or recent suicidal ideation or behaviors.   Patient denies current or recent homicidal ideation or behaviors.   Patient denies current or recent self injurious behavior or ideation.   Patient denies other safety concerns.   Patient reports there has been no change in risk factors since their last session.     Patient reports there has been no change in protective factors since their last session.     Recommended that patient call 911 or go to the local ED should there be a change in any of these risk factors.     Appearance:   Appropriate    Eye Contact:   Good    Psychomotor Behavior: Agitated    Attitude:   Cooperative  Pleasant   Orientation:   Person Place Time Situation   Speech    Rate / Production: Normal/ Responsive Normal     Volume:  Normal    Mood:    Anxious  Depressed    Affect:    Appropriate    Thought Content:  Clear    Thought Form:  Coherent  Logical    Insight:    Good      Medication Review:   No changes to current psychiatric medication(s)     Medication Compliance:   Yes     Changes in Health Issues:   None reported     Chemical Use Review:   Substance Use: Chemical use reviewed, no active concerns identified      Tobacco Use: No current tobacco use.      Diagnoses:   296.33 (F33.2) Major Depressive Disorder, Recurrent Episode, Severe _  300.02 (F41.1) Generalized Anxiety Disorder    Collateral Reports Completed:    [facility] and No Collateral obtained.    PLAN: (Patient Tasks / Therapist Tasks / Other)  Follow through with psychiatrist referral to counseling.  Reach out to new acquaintance.   Return in 1 week    Is this the patient's last discharge?:  No    Procedure Code: Psychotherapy (with patient) - 45 [CPT 33855]    Archana Blanton, [facility]                                                     ______________________________________________________________________

## 2024-07-16 ENCOUNTER — TELEPHONE (OUTPATIENT)
Dept: GASTROENTEROLOGY | Facility: CLINIC | Age: 69
End: 2024-07-16
Payer: MEDICARE

## 2024-07-16 ENCOUNTER — HOSPITAL ENCOUNTER (OUTPATIENT)
Facility: CLINIC | Age: 69
End: 2024-07-16
Attending: INTERNAL MEDICINE | Admitting: INTERNAL MEDICINE
Payer: MEDICARE

## 2024-07-16 NOTE — TELEPHONE ENCOUNTER
FUTURE VISIT INFORMATION      SURGERY INFORMATION:  Date: 24  Location:  HEART CARDIAC CATH LAB   Surgeon:  Teetee Adames MD   Anesthesia Type:  Moderate Sedation   Procedure: Heart Cath Right Heart Cath     RECORDS REQUESTED FROM:       Primary Care Provider: Laith Constantino MD - [facility]    Pertinent Medical History: KYAW, COPD, hypertension    Most recent EKG+ Tracin24    Most recent ECHO: 10/5/23    Most recent Cardiac Stress Test: 21    Most recent PFT's: 21

## 2024-07-16 NOTE — TELEPHONE ENCOUNTER
Caller: Randi Cleary   Reason for Reschedule/Cancellation (please be detailed, any staff messages or encounters to note?):     Per pt -- change date       Prior to reschedule please review:  Ordering Provider:   Sedation Determined: moderate   Does patient have any ASC Exclusions, please identify?: n       Notes on Cancelled Procedure:  Procedure:Lower Endoscopy [Colonoscopy]   Date: 07/16/2024  Location:[facility]; [address] [place] [address]  Surgeon:          Rescheduled: yes   Procedure: Lower Endoscopy [Colonoscopy]  Date: 09/03/2024  Location: [facility]; [address] [place] [address]   Surgeon:    Sedation Level Scheduled  mac          Reason for Sedation Level per order   Prep/Instructions updated and sent: [email or web address]     Does patient need PAC or Pre -Op Rescheduled? : y        Send In - basket message to Panc - Yunior Pool if EUS procedure is canceled or rescheduled: [ N/A, YES or NO] n

## 2024-07-17 ENCOUNTER — VIRTUAL VISIT (OUTPATIENT)
Dept: PSYCHOLOGY | Facility: CLINIC | Age: 69
End: 2024-07-17
Attending: PSYCHIATRY & NEUROLOGY
Payer: MEDICARE

## 2024-07-17 DIAGNOSIS — F41.1 GENERALIZED ANXIETY DISORDER: Primary | ICD-10-CM

## 2024-07-17 DIAGNOSIS — F33.2 MAJOR DEPRESSIVE DISORDER, RECURRENT SEVERE WITHOUT PSYCHOTIC FEATURES (H): ICD-10-CM

## 2024-07-17 PROCEDURE — 90834 PSYTX W PT 45 MINUTES: CPT | Mod: 95 | Performed by: MARRIAGE & FAMILY THERAPIST

## 2024-07-17 ASSESSMENT — ANXIETY QUESTIONNAIRES
2. NOT BEING ABLE TO STOP OR CONTROL WORRYING: SEVERAL DAYS
GAD7 TOTAL SCORE: 10
8. IF YOU CHECKED OFF ANY PROBLEMS, HOW DIFFICULT HAVE THESE MADE IT FOR YOU TO DO YOUR WORK, TAKE CARE OF THINGS AT HOME, OR GET ALONG WITH OTHER PEOPLE?: VERY DIFFICULT
4. TROUBLE RELAXING: MORE THAN HALF THE DAYS
7. FEELING AFRAID AS IF SOMETHING AWFUL MIGHT HAPPEN: MORE THAN HALF THE DAYS
GAD7 TOTAL SCORE: 10
GAD7 TOTAL SCORE: 10
1. FEELING NERVOUS, ANXIOUS, OR ON EDGE: MORE THAN HALF THE DAYS
5. BEING SO RESTLESS THAT IT IS HARD TO SIT STILL: SEVERAL DAYS
6. BECOMING EASILY ANNOYED OR IRRITABLE: SEVERAL DAYS
3. WORRYING TOO MUCH ABOUT DIFFERENT THINGS: SEVERAL DAYS
IF YOU CHECKED OFF ANY PROBLEMS ON THIS QUESTIONNAIRE, HOW DIFFICULT HAVE THESE PROBLEMS MADE IT FOR YOU TO DO YOUR WORK, TAKE CARE OF THINGS AT HOME, OR GET ALONG WITH OTHER PEOPLE: VERY DIFFICULT
7. FEELING AFRAID AS IF SOMETHING AWFUL MIGHT HAPPEN: MORE THAN HALF THE DAYS
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
## 2024-07-17 NOTE — TELEPHONE ENCOUNTER
FUTURE VISIT INFORMATION        SURGERY INFORMATION:  Date: 24  Location:  [facility] C[facility]   Surgeon:  Teetee Adames MD   Anesthesia Type:  Moderate Sedation   Procedure: Heart Cath Right Heart Cath      RECORDS REQUESTED FROM:         Primary Care Provider: Laith Constantino MD - [facility]     Pertinent Medical History: KYAW, COPD, hypertension     Most recent EKG+ Tracin24     Most recent ECHO: 10/5/23     Most recent Cardiac Stress Test: 21     Most recent PFT's: 21

## 2024-07-18 ENCOUNTER — MYC MEDICAL ADVICE (OUTPATIENT)
Dept: GASTROENTEROLOGY | Facility: CLINIC | Age: 69
End: 2024-07-18
Payer: MEDICARE

## 2024-07-18 ENCOUNTER — ANESTHESIA EVENT (OUTPATIENT)
Dept: BEHAVIORAL HEALTH | Facility: CLINIC | Age: 69
End: 2024-07-18
Payer: MEDICARE

## 2024-07-18 ENCOUNTER — PATIENT OUTREACH (OUTPATIENT)
Dept: CARE COORDINATION | Facility: CLINIC | Age: 69
End: 2024-07-18
Payer: MEDICARE

## 2024-07-18 NOTE — PROGRESS NOTES
"Clinic Care Coordination Contact  Follow Up Progress Note      Assessment: CCC RN spoke with patient today to follow up on goal and to discuss any needs or concerns. Patient said her mental health has been much more stable since her hospitalization and ECT treatments. She said she has had a total of 14 ECT treatments, and is currently receiving them on an outpatient basis weekly. She stated he has found a new therapist she really \"clicks\" with. Patient also stated she finds her new psychiatric provider help. Patient denied any other needs or concerns at this time.     Care Gaps:    Health Maintenance Due   Topic Date Due    HF ACTION PLAN  Never done    DIABETIC FOOT EXAM  Never done    COPD ACTION PLAN  Never done    ZOSTER IMMUNIZATION (1 of 2) Never done    MEDICARE ANNUAL WELLNESS VISIT  Never done    EYE EXAM  12/07/2021    MAMMO SCREENING  08/15/2023    COLORECTAL CANCER SCREENING  02/25/2024    COVID-19 Vaccine (8 - 2023-24 season) 04/29/2024    MICROALBUMIN  07/14/2024       Scheduled with PCP on 10/22/24      Care Plans  Care Plan: Mental Health       Problem: Mental Health Symptoms Need Improvement       Goal: I would like to feel as though my mental health has improved.       Start Date: 9/25/2023 Expected End Date: 9/24/2024    This Visit's Progress: 50% Recent Progress: 10%    Priority: High    Note:     Barriers: history of anxiety, bipolar disorder, trauma related disorder  Strengths: strong advocate for herself  Patient expressed understanding of goal: yes  Action steps to achieve this goal:  1. I will continue to attend all appointments with my therapist Mary Kay Gomez and with my new psychiatric provide.   2. I will continue to attend all appointments with the [facility] partial-hospitalization program.   3. I will continue to the use the skills I have learned through therapy and the partial hospitalization program.   4. I will take my medications as directed.   5. I will report progress towards this goal at " schedule outreach telephone calls from my Care Coordinator.     Disucssed on 7/18/24                              Intervention/Education provided during outreach: Discussed the importance of taking her medications as directed. Encouraged her to contact Care Coordination for any additional needs or concerns.      Outreach Frequency: monthly, more frequently as needed    Plan: CCC RN will continue to monitor, support patient with current goal and will be available to assist as nursing needs arise.     Care Coordinator will follow up in one month.

## 2024-07-18 NOTE — LETTER
[facility]  Patient Centered Plan of Care  About Me:        Patient Name:  Randi Zhou,     It was nice to talk with you today. Here is a copy of your Care Plan with the goal we are supporting you with at this time. Please let me know if I can help in any other way.     Thanks,     Dave Myhre, RN   [facility] RN  [phone]    YOB: 1955  Age:         68 year old   [place] MRN:    [id number] Telephone Information:  Home Phone [phone]   Mobile [phone]       Address:  [address] [place] [address] Email address:  [email or web address]      Emergency Contact(s)    Name Relationship Lgl Grd Work Phone Home Phone Mobile Phone   1. JOANIE ROBERTO Spouse No NONE [phone]   2. ANGELA ROBERTO Sister-in-Law   [phone]           Primary language:  English     needed? No   [employer]:  [phone] op. 1  Other communication barriers:None    Preferred Method of Communication:     Current living arrangement: I live in a private home with spouse    Mobility Status/ Medical Equipment: Independent w/Device        Health Maintenance  Health Maintenance Reviewed: Due/Overdue   Health Maintenance Due   Topic Date Due    HF ACTION PLAN  Never done    DIABETIC FOOT EXAM  Never done    COPD ACTION PLAN  Never done    ZOSTER IMMUNIZATION (1 of 2) Never done    MEDICARE ANNUAL WELLNESS VISIT  Never done    EYE EXAM  12/07/2021    MAMMO SCREENING  08/15/2023    COLORECTAL CANCER SCREENING  02/25/2024    COVID-19 Vaccine (8 - 2023-24 season) 04/29/2024    MICROALBUMIN  07/14/2024          My Access Plan  Medical Emergency 911   Primary Clinic Line [facility] - [phone]   24 Hour Appointment Line [phone] or  [phone]) (toll-free)   24 Hour Nurse Line [phone] (toll-free)   Preferred Urgent Care [facility], [phone]     [facility]  [facility]  [phone]     Preferred Pharmacy [id number] [place] [address] - [address] [place] [address] [employer]     Behavioral Health Crisis Line The National Suicide Prevention Lifeline at 1-194.512.5152 or Text/Call 988           My Care Team Members  Patient Care Team         Relationship Specialty Notifications Start End    Laith Constantino MD PCP - General Internal Medicine  7/10/24     Phone: [phone] Fax: [phone]         [address] [place] [address]    Eli Hilton, RN Specialty Care Coordinator Cardiology Admissions 8/13/19     Pulmonary HTN    Phone: [phone] Pager: [phone] Fax: [phone]       Bindu Walden, RN Specialty Care Coordinator Cardiology Admissions 8/26/19     Pulmonary HTN    Phone: [phone] Pager: [phone] Fax: [phone]       Alee Coker MD MD INTERNAL MEDICINE - ENDOCRINOLOGY, DIABETES & METABOLISM  9/26/19     Phone: [phone] Fax: [phone]         [address] [place] [address] [address] [facility] [address]    Francisco J Edwards MD MD Cardiology  3/17/20     Phone: [phone] Fax: [phone]         [address] [place] [address]    Francisco J Edwards MD Assigned Heart and Vascular Provider   10/23/20     Phone: [phone] Fax: [phone]         [address] [place] [address]    Liza Reynoso, RN Specialty Care Coordinator Cardiology Admissions 4/5/22     Pulmonary HTN    Phone: [phone] Pager: [phone] Fax: [phone]       Waylon Catherine, PharmD Pharmacist Pharmacist  10/3/22     Phone: [phone] Fax: [phone]         [facility] [address] [place] [address]    Dusty Dubois MD Assigned Pain Medication Provider   1/9/23     Phone: [phone] Fax: [phone] [address] [facility] [address]    Johan England [facility] Pharmacist   3/1/23     Phone: [phone] Fax: [phone] [place] [address]    Aparna Hutchinson MD MD Neurology  3/27/23     Phone: [phone] Fax: [phone]         [address] [facility] [address]    Johan England [facility] Pharmacist Pharmacist  4/1/23     Phone: [phone] Fax: [phone]         [address] [place] [address]    Miki Richardson, RN Specialty Care Coordinator  Admissions 4/19/23     Phone: [phone] Pager: [phone]        Roland Das MD MD Psychiatry  7/27/23     Phone: [phone] Fax: [phone]         [facility] [address] [place] [address]    Tova Pablo MD Assigned Cancer Care Provider   8/5/23     Phone: [phone] Fax: [phone]         [address] [place] [address]    Mami Caballero MD Assigned Neuroscience Provider   9/16/23     Phone: [phone] Pager: [phone] Fax: [phone]        [address] [place] [address]    Laith Constantino MD Assigned PCP   10/21/23     Phone: [phone] Fax: [phone]         [facility] [address]    Kaushik Huffman DPM Assigned Surgical Provider   10/21/23     Phone: [phone] Fax: [phone]         Replaced by [facility] [address] [facility] [address]    Felicia Hicks, PharmD Pharmacist Pharmacist  10/31/23     Phone: [phone] Fax: [phone]         [address] [place] [address]    Felicia Hicks, PharmD Assigned MTM Pharmacist   11/4/23     Phone: [phone] Fax: [phone]         [address] [place] [address]    Amy Hughes [facility] Pharmacist   12/29/23     Phone: [phone] Fax: [phone]         [facility] [address] [facility] 200 [facility] [address]    Alee Coker MD Assigned Endocrinology Provider   1/6/24     Phone: [phone] Fax: [phone]         [address] [facility] 101 [facility] [address]    Sharmila Gregory MD Assigned Pulmonology Provider   2/23/24      Phone: [phone] Fax: [phone]         [address] [place] [address] [facility] [address]    Crissy Musa LPN Specialty Care Coordinator Hematology & Oncology Admissions 4/16/24     B Mary Kay Weir, [facility] Assigned Behavioral Health Provider   4/23/24     Phone: [phone]          [address] [place] [address] [facility] [address]    Jeannette Mendoza APRN CNP Nurse Practitioner Psychiatry  6/27/24     Phone: [phone] Fax: [phone]         [address] [facility] [address]    Teetee Adames MD MD Cardiovascular Disease  7/16/24     Phone: [phone] Fax: [phone]         [address] [facility] [address]    Tiara Javier APRN CNS Clinical Nurse Specialist Anesthesiology  7/16/24     Phone: [phone] Fax: [phone]         [address] [facility] 450 [facility] [address]                My Care Plans  Self Management and Treatment Plan    Care Plan  Care Plan: Mental Health       Problem: Mental Health Symptoms Need Improvement       Goal: I would like to feel as though my mental health has improved.       Start Date: 9/25/2023 Expected End Date: 9/24/2024    This Visit's Progress: 50% Recent Progress: 10%    Priority: High    Note:     Barriers: history of anxiety, bipolar disorder, trauma related disorder  Strengths: strong advocate for herself  Patient expressed understanding of goal: yes  Action steps to achieve this goal:  1. I will continue to attend all appointments with my therapist Mary Kay Gomez and with my new psychiatric provide.   2. I will continue to attend all appointments with the [facility] partial-hospitalization program.   3. I will continue to the use the skills I have learned through therapy and the partial hospitalization program.   4. I will take my medications as directed.   5. I will report progress towards this goal at schedule outreach telephone calls from my Care Coordinator.     Disucssed on 7/18/24                              Action Plans on File:                        Advance Care Plans/Directives:   Advanced Care Plan/Directives on file:   No    Discussed with patient/caregiver(s): No data recorded           My Medical and Care Information  Problem List   Patient Active Problem List   Diagnosis    DJD (degenerative joint disease), ankle and foot    Hereditary hemochromatosis (H24)    Pulmonary hypertension (H)    Postablative hypothyroidism    Hx of corticosteroid therapy    Class 2 obesity due to excess calories without serious comorbidity in adult    Prediabetes    KYAW (obstructive sleep apnea)    Type 2 diabetes mellitus without complication, without long-term current use of insulin (H)    Hypokalemia    COPD (chronic obstructive pulmonary disease) (H)    Generalized anxiety disorder    Restless leg syndrome    Vitamin B12 deficiency    Vitamin D deficiency    Morbid obesity (H)    Cord compression (H)    History of cervical spinal surgery    Cervical stenosis of spinal canal    Alcohol use disorder, moderate, in sustained remission (H)    Essential hypertension    Psoriatic arthropathy (H)    Primary osteoarthritis involving multiple joints    Gastroesophageal reflux disease with esophagitis without hemorrhage    Numbness in both hands    Chronic, continuous use of opioids    Trauma and stressor-related disorder    Post-menopausal    Suicidal ideation    Depression with anxiety    Severe recurrent major depression without psychotic features (H)    MDD (major depressive disorder), recurrent episode, severe (H)      Current Medications and Allergies:  See printed Medication Report.    Care Coordination Start Date: 9/21/2023   Frequency of Care Coordination: monthly, more frequently as needed     Form Last Updated: 07/18/2024

## 2024-07-18 NOTE — TELEPHONE ENCOUNTER
Caller: Randi Cleary     Reason for Reschedule/Cancellation   (please be detailed, any staff messages or encounters to note?): Just wants to delay a little longer and not prep over a holiday weekend      Prior to reschedule please review:  Ordering Provider:   Sedation Determined: mac  Does patient have any ASC Exclusions, please identify?: curtis Marie       Notes on Cancelled Procedure:  Procedure: Lower Endoscopy [Colonoscopy]   Date: 9/3  Location: [facility]; [address] [facility] [place] [address]   Surgeon:       Rescheduled: Yes,   Procedure: Lower Endoscopy [Colonoscopy]    Date: 10/1   Location: [facility]; [address] [facility] [place] [address]    Surgeon: Guru and Colleen ARROYO is okay with anytime on his schedule as patient does not want to go in so early   Sedation Level Scheduled  mac ,  Reason for Sedation Level ordered   Instructions updated and sent: y     Does patient need PAC or Pre -Op Rescheduled? : Y        Did you cancel or rescheduled an EUS procedure? No.

## 2024-07-19 ENCOUNTER — ANESTHESIA (OUTPATIENT)
Dept: BEHAVIORAL HEALTH | Facility: CLINIC | Age: 69
End: 2024-07-19
Payer: MEDICARE

## 2024-07-19 ENCOUNTER — HOSPITAL ENCOUNTER (OUTPATIENT)
Dept: BEHAVIORAL HEALTH | Facility: CLINIC | Age: 69
Discharge: HOME | End: 2024-07-19
Attending: PSYCHIATRY & NEUROLOGY
Payer: MEDICARE

## 2024-07-19 VITALS
RESPIRATION RATE: 16 BRPM | DIASTOLIC BLOOD PRESSURE: 78 MMHG | OXYGEN SATURATION: 96 % | TEMPERATURE: 97.2 F | SYSTOLIC BLOOD PRESSURE: 129 MMHG | HEART RATE: 85 BPM

## 2024-07-19 DIAGNOSIS — F33.2 SEVERE RECURRENT MAJOR DEPRESSION WITHOUT PSYCHOTIC FEATURES (H): Primary | ICD-10-CM

## 2024-07-19 PROCEDURE — 250N000011 HC RX IP 250 OP 636: Mod: JZ

## 2024-07-19 PROCEDURE — 370N000017 HC ANESTHESIA TECHNICAL FEE, PER MIN: Performed by: ANESTHESIOLOGY

## 2024-07-19 PROCEDURE — 90870 ELECTROCONVULSIVE THERAPY: CPT | Performed by: ANESTHESIOLOGY

## 2024-07-19 PROCEDURE — 90870 ELECTROCONVULSIVE THERAPY: CPT | Performed by: PSYCHIATRY & NEUROLOGY

## 2024-07-19 PROCEDURE — 250N000009 HC RX 250

## 2024-07-19 PROCEDURE — 250N000011 HC RX IP 250 OP 636: Performed by: PSYCHIATRY & NEUROLOGY

## 2024-07-19 PROCEDURE — 90870 ELECTROCONVULSIVE THERAPY: CPT

## 2024-07-19 RX ORDER — DEXAMETHASONE SODIUM PHOSPHATE 4 MG/ML
4 INJECTION, SOLUTION INTRA-ARTICULAR; INTRALESIONAL; INTRAMUSCULAR; INTRAVENOUS; SOFT TISSUE
Status: DISCONTINUED | OUTPATIENT
Start: 2024-07-19 | End: 2024-07-20 | Stop reason: HOSPADM

## 2024-07-19 RX ORDER — FENTANYL CITRATE 50 UG/ML
25 INJECTION, SOLUTION INTRAMUSCULAR; INTRAVENOUS EVERY 5 MIN PRN
Status: DISCONTINUED | OUTPATIENT
Start: 2024-07-19 | End: 2024-07-20 | Stop reason: HOSPADM

## 2024-07-19 RX ORDER — KETOROLAC TROMETHAMINE 30 MG/ML
30 INJECTION, SOLUTION INTRAMUSCULAR; INTRAVENOUS ONCE
Status: CANCELLED
Start: 2024-07-19 | End: 2024-07-19

## 2024-07-19 RX ORDER — ONDANSETRON 2 MG/ML
4 INJECTION INTRAMUSCULAR; INTRAVENOUS EVERY 30 MIN PRN
Status: DISCONTINUED | OUTPATIENT
Start: 2024-07-19 | End: 2024-07-20 | Stop reason: HOSPADM

## 2024-07-19 RX ORDER — ONDANSETRON 4 MG/1
4 TABLET, ORALLY DISINTEGRATING ORAL EVERY 30 MIN PRN
Status: DISCONTINUED | OUTPATIENT
Start: 2024-07-19 | End: 2024-07-20 | Stop reason: HOSPADM

## 2024-07-19 RX ORDER — KETOROLAC TROMETHAMINE 30 MG/ML
30 INJECTION, SOLUTION INTRAMUSCULAR; INTRAVENOUS ONCE
Status: COMPLETED | OUTPATIENT
Start: 2024-07-19 | End: 2024-07-19

## 2024-07-19 RX ORDER — HYDROMORPHONE HCL IN WATER/PF 6 MG/30 ML
0.4 PATIENT CONTROLLED ANALGESIA SYRINGE INTRAVENOUS EVERY 5 MIN PRN
Status: DISCONTINUED | OUTPATIENT
Start: 2024-07-19 | End: 2024-07-20 | Stop reason: HOSPADM

## 2024-07-19 RX ORDER — NALOXONE HYDROCHLORIDE 0.4 MG/ML
0.1 INJECTION, SOLUTION INTRAMUSCULAR; INTRAVENOUS; SUBCUTANEOUS
Status: DISCONTINUED | OUTPATIENT
Start: 2024-07-19 | End: 2024-07-20 | Stop reason: HOSPADM

## 2024-07-19 RX ORDER — SODIUM CHLORIDE, SODIUM LACTATE, POTASSIUM CHLORIDE, CALCIUM CHLORIDE 600; 310; 30; 20 MG/100ML; MG/100ML; MG/100ML; MG/100ML
INJECTION, SOLUTION INTRAVENOUS CONTINUOUS
Status: DISCONTINUED | OUTPATIENT
Start: 2024-07-19 | End: 2024-07-20 | Stop reason: HOSPADM

## 2024-07-19 RX ORDER — METHOHEXITAL IN WATER/PF 100MG/10ML
SYRINGE (ML) INTRAVENOUS PRN
Status: DISCONTINUED | OUTPATIENT
Start: 2024-07-19 | End: 2024-07-19

## 2024-07-19 RX ORDER — FENTANYL CITRATE 50 UG/ML
50 INJECTION, SOLUTION INTRAMUSCULAR; INTRAVENOUS EVERY 5 MIN PRN
Status: DISCONTINUED | OUTPATIENT
Start: 2024-07-19 | End: 2024-07-20 | Stop reason: HOSPADM

## 2024-07-19 RX ORDER — DIAZEPAM 10 MG/2ML
2.5 INJECTION, SOLUTION INTRAMUSCULAR; INTRAVENOUS
Status: DISCONTINUED | OUTPATIENT
Start: 2024-07-19 | End: 2024-07-20 | Stop reason: HOSPADM

## 2024-07-19 RX ORDER — HYDROMORPHONE HCL IN WATER/PF 6 MG/30 ML
0.2 PATIENT CONTROLLED ANALGESIA SYRINGE INTRAVENOUS EVERY 5 MIN PRN
Status: DISCONTINUED | OUTPATIENT
Start: 2024-07-19 | End: 2024-07-20 | Stop reason: HOSPADM

## 2024-07-19 RX ORDER — NICARDIPINE HCL-0.9% SOD CHLOR 1 MG/10 ML
SYRINGE (ML) INTRAVENOUS PRN
Status: DISCONTINUED | OUTPATIENT
Start: 2024-07-19 | End: 2024-07-19

## 2024-07-19 RX ORDER — LABETALOL HYDROCHLORIDE 5 MG/ML
10 INJECTION, SOLUTION INTRAVENOUS
Status: DISCONTINUED | OUTPATIENT
Start: 2024-07-19 | End: 2024-07-20 | Stop reason: HOSPADM

## 2024-07-19 RX ORDER — ACETAMINOPHEN 325 MG/1
975 TABLET ORAL ONCE
Status: DISCONTINUED | OUTPATIENT
Start: 2024-07-19 | End: 2024-07-20 | Stop reason: HOSPADM

## 2024-07-19 RX ADMIN — KETOROLAC TROMETHAMINE 30 MG: 30 INJECTION, SOLUTION INTRAMUSCULAR at 08:02

## 2024-07-19 RX ADMIN — Medication 100 MG: at 08:12

## 2024-07-19 RX ADMIN — NICARDIPINE HYDROCHLORIDE 500 MCG: 25 INJECTION INTRAVENOUS at 08:16

## 2024-07-19 RX ADMIN — SUCCINYLCHOLINE CHLORIDE 90 MG: 20 INJECTION, SOLUTION INTRAMUSCULAR; INTRAVENOUS; PARENTERAL at 08:13

## 2024-07-19 ASSESSMENT — COPD QUESTIONNAIRES: COPD: 1

## 2024-07-19 NOTE — PROCEDURES
"Procedures  [facility]   ECT Procedure Note   07/19/2024    Randi Cleary is a 68 year old female patient.  [id number]    Patient Status: outpatient    Is this the first in a series of 12 treatments?  No      Allergies   Allergen Reactions    Serotonin Reuptake Inhibitors (Ssris) Anxiety, Difficulty breathing, Headache, Palpitations and Shortness Of Breath    Buspirone      The patient states she had serotonin syndrome    Cephalexin      Other reaction(s): unknown rxn.    Desvenlafaxine      Serotonin syndrome    Diclofenac Sodium [Diclofenac]      Serotonin syndrome and restless legs syndrome    Gabapentin      Drove on the wrong side of the highway    Levofloxacin      \"CAN'T REMEMBER\"    Penicillins      \"SORES IN MOUTH\"    Riluzole Difficulty breathing and Swelling    Sulfa Antibiotics      \"PT DOES NOT KNOW WHAT THE REACTION WAS\"    Topiramate Other (See Comments)     Frequent urination       Weight:  0 lbs 0 oz / 0 kg          Indications for ECT:   Medications ineffective and Psychotherapies ineffective         Clinical Narrative:   HPI - The patient describes a lifelong history of depression dating back to elementary school, worsening after her father's death when she was 15yo and especially in the 1980s in the setting of worsening physical health (since falling and breaking her ankle), which led to the the initiation of fluoxetine, her first antidepressant.  Her history has been primarily characterized by low mood, with some ups and downs but no extended depression-free periods since then.  She has tried many different medications from different classes, but cannot recall any one being particularly helpful for her.  She has experienced past episodes of particularly irritable mood and concomitant decreased sleep need, although most of these have lasted 1-2 days and triggered by anger related to external stressors.  She has at least one episode of a more extended " "episode of elevated mood (and associated grandiosity, increased spending, flight of ideas, increased goal directed behaviors, pressured speech, and odd and embarrassing behavior), which occurred in the context of relapse on alcohol in 2021. She does not endorse any events before about age 48.     The patient's depression is characterized by near daily low mood, frequent anhedonia, with sleep difficulties (although c/b pain, KYAW, and RLS), fatigue, feelings of guilt/worthlessness, and impaired concentration.  She reports feelings of \"despair,\" at times with active SI with plan, but denies any intent to act on this - \"maybe it's hope.\"  She has 1 lifetime suicide attempt (overdose) in the 1980s, for which she was psychiatrically hospitalized.  She has a remote history of SIB (cutting) but not recently.       Psych pertinent item history includes includes suicide attempt , suicidal ideation, SIB , aggression, trauma hx, substance use: alcohol, cannabis, and Patient has a history of alcohol dependence treated 20+ years ago and she relapsed in 2020 for a couple of months, in her 20s she\" loved getting high\" on cocaine, LSD, mushrooms, speed, white cross, mutiple psychotropic trials , psych hosp, ketamine, and major medical problems.    Target Symptoms for Improvement: Amotivation, Fatigue, Improved self-image and Panic attacks         Diagnosis:   Major depression         Assessment:   #1 05/24/24 Some circumstantial thought, needs some redirection, 3.5 hours sleep last night, anxious, mood 1/10, PDW but no SI, never had ECT before - only TMS. No AVH.   #2 05/29/24  Mood 4/10, better over w/e, some word find difficulties, no AVH/SI/PDW. Chronic sciatica pain, neck and shoulder pain - didn't worsen with ECT. Mild headache.   #3 05/31/24  Mood 4/10, No SI, PDW, AVH, some wrist pain and headache, memory might be improving.    #4 6/3/24  Phq-9= 13, mood 3/10.  Yesterday was very tearful, still a bit today.  Last treatment " "had awareness under paralysis.  Otherwise, some initial confusion after first ECT but no subsequent cognitive effects.  Signed consent to continue.  #5 6/5/24 Mood 4-5/10  She feels like her \"rage\" is less and she feels lighter. but no cognitive side effects. She complains of excessive sweating and is concerned her thryoid is unbalanced. She is somatically focused She reports pain on the side of her neck radiating down to her chest. She had a migraine she thinks over the weekend which usual starts as occipital tension.  #6 6/7/24 mood 4-5/10. No SI. She does have some baseline long term memory difficulties no AVH.   #7 6/10/24  She still is worried that She is not able to go home. She had visitors this weekend who said \"you don't seem to have the weight of the world on your shoulders anymore\" she disagrees she had a downward spiral over the weekend when there was a mix up with her clothes and she usually feels tearful after ECT. She does not report anything feeling worse. She gets down on herself when she has an anxiety spiral and it was the 1st one she has had since admission.   #8 6/12/24 Winnie reported some tearfulness overnight. She is noticing a weight lifting mood 6/10 . Reports some difficulty with remembering names but believes this is at baseline.   #9 6/14/24 Mood 5/10 (10 best) She feels like she is improving each time . She still has occasional crying spells but she feels she bounces back quicker. She is still anxious about going home. No Si. Tolerating ECT  #10 06/17/24 mood 5/10 She does feellike she has gotten some improvement since starting Ect but still has times where she gets tearful and anxious \"goes down the rabit hole\" but not as often . She has had ketamine troches before with pain  management to no effect but wonders about ketamine infusions.  #11 06/19/24 Mood today is 5/10. Patient is wondering whether she could do a ketamine course (did have ketamine in the past), despite of being on " "opioids. Feels that ketamine can help with \"anger, tearing and anxiety.\" She complains to the anesthesiologist about recent urinary incontinence which needs to be considered when  using ketamine. She wants to try it for anger ,tearing and anxiety which is not a standard indication  #12 06/21/24 Mood 5/10, no PDW/SI, but some anxiety around pain this AM.  Patient is interested in maintenance ECT at her attending psychiatrist's recommendation to prevent the possibility of her mood dipping as low as it did previously that led to her hospitalization.  Discussed pros and cons of maintenance ECT, suggested alternative of maintenance medications/therapy and/or watchful waiting with possibility of retreatment should she relapse, as well as alternate interventions including ketamine.  At the moment, she is most interested in pursuing maintenance ECT - will plan for next Friday pending confirmation with her family that she has post-ECT ride and monitoring.  M1 06/28/24 Mood ups and downs, irritability, anxiety, sadness switching multiple times a day since being discharged home.  Had difficulty with the transition home, was harder than she thought it would be.  However, still able to \"push away\" PDW/SI, and did not have periods of persistently low mood this past week.  Has noticed some anterograde and retrograde amnesia of the 1-2 months prior to starting ECT.  Will continue to track.  Today, mood 6/10 \"now that I'm at ECT.\"  M2 07/05/24 She was tearful, states that she doesn't feel good, \"starting to drop\", states that the mood change happened in Wednesday somewhat suddenly, when she encountered several business stressors and felt overwhelmed. Mood 3-4/10. Denies SI and feels safe at home. Still reports memory issues. Reports that the day program has been overwhelming.    She cancelled her colonoscopy in order to focus on her right heart cath the next week. She feels like she has too many procedures scheduled and pushed off " her sleep study also.  M3 7/12/24 Doing well.  Somewhat stressed witht all the medical appointments she has to coordinate. Her colonoscopy got cancelled because of her upcoming appointment with cardiology. Canceled the outpatient program but interested in doing the group therapy at [facility].   M4 7/19/24 Doing well. Less frustrated and started therapy. Reports short term memory impairment. That said, she is hesitant to space ECT to [id number]         Pause for the Cause:     Correct patient Yes   Correct procedure/laterality settings: Yes           Intra-Procedure Documentation:     ECT #: 16   Treatment number this series: M4   Total treatment number: 16     Type of ECT:  Right, unilateral ultrabrief    ECT Medications:    Toradol 30mg iv - for headache/myalgia     Brevital: 100 mg (incr from 90 mg as still awake)   Succinyl Choline: 90 mg    BP - nicardipine 1500 mcg     ECT Strip Summary: RUL Titration #3: 38.4 mC   Energy Level:  384.0mC, 0.3 ms, 100 Hz, 8 sec, 800 mA     Motor Seizure Duration: 27 seconds  EEG Seizure Duration: 39 seconds      Time for re-orientation: >30 minutes on 7/19/24    Complications: none    Plan:   - Plan for maintenance q2elvpy. Next 8/2/24  - Monitor depression severity with clinical assessment augmented with PHQ9 every other treatment  - Continue current medications    Discharge instructions:    -- Remember to NOT take gabapentin after 6pm the night before each treatment  -- You will have to check to make sure somebody can drive you to and from the hospital and monitor you for 6 hours after each treatment  -- Maintenance ECT  every two weeks, then to once every month.  The goal of maintenance ECT is to space out and eventually stop  -- During maintenance, you can't drive for 24 hours after each treatment.    - We discussed other alternatives including trying ketamine through the [facility] or staying on your regular medications and therapy, but at the moment you were most  interested in pursuing maintenance    - Per Dr Varela:   - Consider trial of serotonin modulator (e.g., vilazodone vs. vortioxetine) or novel agent Auvelity  - Consider augmentation with atypical antipsychotic (e.g., quetiapine or aripiprazole), though there are concerns given pre-diabetes    Assisted by Dr Leonard Hutchison MD  Department of Psychiatry and Behavioral Sciences

## 2024-07-19 NOTE — ANESTHESIA PREPROCEDURE EVALUATION
Anesthesia Pre-Procedure Evaluation    Patient: Randi Cleary   MRN: [id number] : 1955        Procedure : * No procedures listed *          Past Medical History:   Diagnosis Date     Bipolar 2 disorder (H)      Breast cancer (H)     lumpectomy, radiation, chemo     Chronic pain syndrome      COPD (chronic obstructive pulmonary disease) (H)     asthma     Cord compression (H) 2021     Dizzy      Drug tolerance     opioid     Esophageal reflux      Fatigue      Generalized anxiety disorder      Graves disease      Hemochromatosis 2018    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; H63D not detected     History of breast cancer 2020    Formatting of this note might be different from the original. Created by Conversion  Replacement Utility updated for latest IMO load Formatting of this note might be different from the original. Created by Conversion  Replacement Utility updated for latest IMO load     History of corticosteroid therapy 2019     History of partial adrenalectomy (H24) 2019     History of pheochromocytoma 2019     Hx antineoplastic chemotherapy      Hx of radiation therapy      Hyperlipidaemia      Hypertension      Impaired fasting glucose      Injury of neck, whiplash 07/15/2021     Joint pain      KYAW (obstructive sleep apnea) 2016     Osteopenia      Pheochromocytoma, left 2017    laparoscopically removed     Postablative hypothyroidism 1995     Prediabetes 10/03/2019    by A1c     Psoriasis      Psoriatic arthropathy (H)      Right rotator cuff tear      RLS (restless legs syndrome)     on ropinorole     Sacroiliitis (H24)      Serotonin syndrome 2020    [facility] - While on desvenlafaxine 100mg     Snoring      Spinal stenosis      Status post coronary angiogram 10/03/2019     Urinary incontinence      Vitamin B 12 deficiency 2009     Vitamin D deficiency 2010      Past Surgical History:   Procedure Laterality Date     ARTHRODESIS ANKLE        ARTHROPLASTY ANKLE Right 6/29/2015    Procedure: ARTHROPLASTY ANKLE;  Surgeon: Jason Coughlin MD;  Location: [facility]     ARTHROPLASTY REVISION ANKLE Right 6/29/2015    Procedure: ARTHROPLASTY REVISION ANKLE;  Surgeon: Jason Coughlin MD;  Location: [facility]     BIOPSY BREAST       BREAST BIOPSY, CORE RT/LT       COLONOSCOPY       COLONOSCOPY N/A 2/25/2021    Procedure: COLONOSCOPY;  Surgeon: Guru Elke Tolbert MD;  Location: [facility]     CV CORONARY ANGIOGRAM N/A 10/3/2019    Procedure: CV CORONARY ANGIOGRAM;  Surgeon: Bryce Pierre MD;  Location:  HEART CARDIAC CATH LAB     CV RIGHT HEART CATH MEASUREMENTS RECORDED N/A 10/3/2019    Procedure: CV RIGHT HEART CATH;  Surgeon: Bryce Pierre MD;  Location:  HEART CARDIAC CATH LAB     ESOPHAGOSCOPY, GASTROSCOPY, DUODENOSCOPY (EGD), COMBINED N/A 2/25/2021    Procedure: ESOPHAGOGASTRODUODENOSCOPY, WITH BIOPSY;  Surgeon: Guru Elke Tolbert MD;  Location:  GI     EYE SURGERY  2021     HC REMOVE TONSILS/ADENOIDS,<11 Y/O      Description: Tonsillectomy With Adenoidectomy;  Recorded: 04/07/2010;     IR LUMBAR EPIDURAL STEROID INJECTION  10/26/2004     IR LUMBAR EPIDURAL STEROID INJECTION  11/16/2004     IR LUMBAR EPIDURAL STEROID INJECTION  12/21/2004     IR LUMBAR EPIDURAL STEROID INJECTION  6/8/2006     JOINT REPLACEMENT       LAMINOPLASTY CERVICAL POSTERIOR THREE+ LEVELS Left 12/21/2021    Procedure: CERVICAL 3-CERVICAL 6 LEFT OPEN DOOR LAMINOPLASTY AND LEFT CERVICAL 4-5 AND CERVICAL 6-7 POSTERIOR FORAMINOTOMY;  Surgeon: Angela Gregory MD;  Location: [facility] OR     LAPAROSCOPIC ADRENALECTOMY Left 08/02/2017    pheochromocytoma     LAPAROSCOPIC ADRENALECTOMY Left 8/2/2017    Procedure: LAPAROSCOPIC LEFT ADRENALECTOMY, ;  Surgeon: Gab Linares MD;  Location: [facility] OR;  Service:      LENGTHEN TENDON ACHILLES Right 6/29/2015    Procedure: LENGTHEN TENDON ACHILLES;  Surgeon: Ced  "Jason CORNEJO MD;  Location: [facility]     LUMPECTOMY BREAST       LUMPECTOMY BREAST Left 1994     MAMMOPLASTY REDUCTION Right 2015    [place]     MAMMOPLASTY REDUCTION Right     approx late      MASTECTOMY      left lumpectomy with axillary node dissection     MASTECTOMY MODIFIED RADICAL       OTHER SURGICAL HISTORY Right     reconstructive breast surgery     OTHER SURGICAL HISTORY      Adrenalectomy for pheochromocytoma     LA MASTECTOMY, MODIFIED RADICAL      Description: Modified Radical Mastectomy Left Breast;  Recorded: 2010;     REPAIR HAMMER TOE Right 2015    Procedure: REPAIR HAMMER TOE;  Surgeon: Jason Coughlin MD;  Location: [facility]     TONSILLECTOMY       TONSILLECTOMY & ADENOIDECTOMY       ZZC ARTHRODESIS,ANKLE,OPEN Right     Description: Ankle Arthrodesis;  Recorded: 2010;      Allergies   Allergen Reactions     Serotonin Reuptake Inhibitors (Ssris) Anxiety, Difficulty breathing, Headache, Palpitations and Shortness Of Breath     Buspirone      The patient states she had serotonin syndrome     Cephalexin      Other reaction(s): unknown rxn.     Desvenlafaxine      Serotonin syndrome     Diclofenac Sodium [Diclofenac]      Serotonin syndrome and restless legs syndrome     Gabapentin      Drove on the wrong side of the highway     Levofloxacin      \"CAN'T REMEMBER\"     Penicillins      \"SORES IN MOUTH\"     Riluzole Difficulty breathing and Swelling     Sulfa Antibiotics      \"PT DOES NOT KNOW WHAT THE REACTION WAS\"     Topiramate Other (See Comments)     Frequent urination      Social History     Tobacco Use     Smoking status: Former     Current packs/day: 0.00     Average packs/day: 2.5 packs/day for 29.2 years (72.9 ttl pk-yrs)     Types: Cigarettes     Start date: 1971     Quit date: 2000     Years since quittin.0     Passive exposure: Past     Smokeless tobacco: Never   Substance Use Topics     Alcohol use: Not Currently     Comment: relapse 2021 " sober       Wt Readings from Last 1 Encounters:   07/02/24 88 kg (194 lb)        Anesthesia Evaluation   Pt has had prior anesthetic.         ROS/MED HX  ENT/Pulmonary:     (+) sleep apnea,                         COPD,              Neurologic:       Cardiovascular:     (+)  hypertension- -   -  - -                                      METS/Exercise Tolerance:     Hematologic:       Musculoskeletal:       GI/Hepatic:     (+) GERD,                   Renal/Genitourinary:       Endo:     (+)  type II DM,        thyroid problem,     Obesity,       Psychiatric/Substance Use:       Infectious Disease:       Malignancy:       Other:            Physical Exam    Airway        Mallampati: III   TM distance: > 3 FB   Neck ROM: full   Mouth opening: > 3 cm    Respiratory Devices and Support         Dental       (+) Modest Abnormalities - crowns, retainers, 1 or 2 missing teeth      Cardiovascular   cardiovascular exam normal       Rhythm and rate: regular     Pulmonary   pulmonary exam normal              OUTSIDE LABS:  CBC:   Lab Results   Component Value Date    WBC 7.9 06/04/2024    WBC 7.1 05/22/2024    HGB 17.0 (H) 06/04/2024    HGB 17.7 (H) 05/22/2024    HCT 48.1 (H) 06/04/2024    HCT 49.9 (H) 05/22/2024     06/04/2024     05/22/2024     BMP:   Lab Results   Component Value Date     06/04/2024     05/22/2024    POTASSIUM 4.3 06/04/2024    POTASSIUM 5.2 06/04/2024    CHLORIDE 103 06/04/2024    CHLORIDE 104 05/22/2024    CO2 21 (L) 06/04/2024    CO2 24 05/22/2024    BUN 17.3 06/04/2024    BUN 9.3 05/22/2024    CR 0.53 06/04/2024    CR 0.57 05/22/2024    GLC 96 06/04/2024     (H) 05/22/2024     COAGS:   Lab Results   Component Value Date    PTT 34 12/13/2021    INR 0.94 12/13/2021     POC:   Lab Results   Component Value Date     (H) 02/25/2021     HEPATIC:   Lab Results   Component Value Date    ALBUMIN 3.7 06/04/2024    PROTTOTAL 7.2 06/04/2024    ALT 33 06/04/2024    AST 35  "06/04/2024    ALKPHOS 80 06/04/2024    BILITOTAL 0.4 06/04/2024     OTHER:   Lab Results   Component Value Date    A1C 5.6 05/22/2024    LINDA 9.5 06/04/2024    MAG 1.9 05/22/2024    TSH 1.69 06/04/2024    T4 1.49 03/29/2024    T3 114 01/18/2023    CRP <2.9 11/16/2021    SED 8 10/17/2023       Anesthesia Plan    ASA Status:  3       Anesthesia Type: General.   Induction: Intravenous.           Consents            Postoperative Care            Comments:               Jocelyn Hunter MD    I have reviewed the pertinent notes and labs in the chart from the past 30 days and (re)examined the patient.  Any updates or changes from those notes are reflected in this note.             # Obesity: Estimated body mass index is 31.31 kg/m  as calculated from the following:    Height as of 7/2/24: 1.676 m (5' 6\").    Weight as of 7/2/24: 88 kg (194 lb).      "

## 2024-07-19 NOTE — TELEPHONE ENCOUNTER
FUTURE VISIT INFORMATION        SURGERY INFORMATION:  Date: 24  Location:  HEART CARDIAC CATH LAB   Surgeon:  Teetee Adames MD   Anesthesia Type:  Moderate Sedation   Procedure: Heart Cath Right Heart Cath      RECORDS REQUESTED FROM:         Primary Care Provider: Laith Constantino MD - [facility]     Pertinent Medical History: KYAW, COPD, hypertension     Most recent EKG+ Tracin24     Most recent ECHO: 10/5/23     Most recent Cardiac Stress Test: 21     Most recent PFT's: 21

## 2024-07-19 NOTE — ANESTHESIA CARE TRANSFER NOTE
Patient: Randi Cleary    Procedure: * No procedures listed *  Electroconvulsive Therapy    Diagnosis: * No pre-op diagnosis entered *  Diagnosis Additional Information: No value filed.    Anesthesia Type:   General     Note:    Oropharynx: oropharynx clear of all foreign objects and spontaneously breathing  Level of Consciousness: drowsy  Oxygen Supplementation: face mask  Level of Supplemental Oxygen (L/min / FiO2): 6  Independent Airway: airway patency not satisfactory and stable  Dentition: dentition changed  Vital Signs Stable: post-procedure vital signs reviewed and stable  Report to RN Given: handoff report given  Patient transferred to: PACU    Handoff Report: Identifed the Patient, Identified the Reponsible Provider, Reviewed the pertinent medical history, Discussed the surgical course, Reviewed Intra-OP anesthesia mangement and issues during anesthesia, Set expectations for post-procedure period and Allowed opportunity for questions and acknowledgement of understanding  Vitals:  Vitals Value Taken Time   BP     Temp     Pulse     Resp     SpO2         Electronically Signed By: Jocelyn Hunter MD  July 19, 2024  8:29 AM

## 2024-07-19 NOTE — DISCHARGE INSTRUCTIONS
ECT Discharge Instructions      During your ECT series:    Do not drive or work heavy equipment until 7 days after your last treatment.  Do not drink alcohol or use street drugs (illicit drugs) while you are having treatments.  Do not make important decisions, including legal decisions.    After your maintenance ECT treatment:    Do not drive for 24 hours after treatment.  If you will be having more than one treatment witihin a week, no driving until 7 days after your last ECT treatment.         After each treatment:    Get plenty of rest. A responsible adult must stay with your for at least 6 hours.  Avoid heavy or risky activities for 24 hours.  If you feel light-headed, sit for a few minutes before standing. Have someone help you get up.  If you have nausea (feel sick to your stomach): Drink only clear liquids such as apple juice, ginger ale, broth or 7UP, Be sure to drink plenty of liquids. Move to a normal diet as you feel able.   If you received Toradol, wait 6 hours before taking ibuprofen.  Call your doctor if:   You have a fever over 100F (37.7 C) (taken under the tongue), or a fever that last more than 24 hours.  Your IV site is red, swollen, very painful or is getting more tender.  You have nausea that gets very bad or does not improve.    If you have any symptoms after ECT, tell our staff before your next treatment.  The ECT Department can be reached at [phone].  The ECT Department is open Mondays, Wednesdays and Fridays from 7:00 AM to 2:00 PM.    To speak to a doctor, call: [facility] psychiatry Dr. Hutchison and Dr. Cotter [phone] fax [phone]      Discharge instructions:               -- Remember to NOT take gabapentin after 6pm the night before each treatment  -- Maintenance ECT  every two weeks, then to once every month.  The goal of maintenance ECT is to space out and eventually stop  -- During maintenance, you can't drive for 24 hours after each treatment.    Other: You have  received Toradol today at 8:02 AM. Toradol is an NSAID.  Do not take any NSAID's including: Ibuprofen, Naproxen Sodium, Asprin , Advil, Motrin, Aleve, Shannan, etc., until six hours after the above time which would be 2:02 PM. If you have any questions check back with your Physician, or pharmacist.     Transported by: Sidney, patient's     Discharge instructions given to Sidney      Instructions for your next appointment:  Covid-19 Testing Instructions: COVID testing is no longer required for your ECT procedures.    Please come to the [facility] entrance and check-in with Security before your ECT appointment.  The [facility] entrance is located right next to the Adult Emergency Room.  The address is [address] [place] [address].    After your appointment, you may be picked up at the [facility] entrance, which is located on the West side of our campus.  The address is [address] [place] [address].  [facility].

## 2024-07-19 NOTE — PROGRESS NOTES
Pt has met discharge criteria.  VSS.  PIV removed without any issue.  Pt moved to discharge area via wheelchair.

## 2024-07-19 NOTE — ANESTHESIA POSTPROCEDURE EVALUATION
Patient: Randi Cleary    Procedure: * No procedures listed *  Electroconvulsive Therapy    Anesthesia Type:  General    Note:  Disposition: Outpatient   Postop Pain Control: Uneventful            Sign Out: Well controlled pain   PONV: No   Neuro/Psych: Uneventful            Sign Out: Acceptable/Baseline neuro status   Airway/Respiratory: Uneventful            Sign Out: Acceptable/Baseline resp. status   CV/Hemodynamics: Uneventful            Sign Out: Acceptable CV status; No obvious hypovolemia; No obvious fluid overload   Other NRE: NONE   DID A NON-ROUTINE EVENT OCCUR? No       Last vitals:  Vitals:    07/19/24 0837 07/19/24 0847 07/19/24 0858   BP: (!) 140/85 115/77 129/78   Pulse: 87 91 85   Resp: 18 16 16   Temp: 36.2  C (97.1  F) 36.4  C (97.5  F) 36.2  C (97.2  F)   SpO2: 98% 95% 96%       Electronically Signed By: Jocelyn Hunter MD  July 19, 2024  9:19 AM

## 2024-07-22 ENCOUNTER — VIRTUAL VISIT (OUTPATIENT)
Dept: BEHAVIORAL HEALTH | Facility: CLINIC | Age: 69
End: 2024-07-22
Payer: MEDICARE

## 2024-07-22 DIAGNOSIS — F41.1 GENERALIZED ANXIETY DISORDER: ICD-10-CM

## 2024-07-22 DIAGNOSIS — F33.2 SEVERE RECURRENT MAJOR DEPRESSION WITHOUT PSYCHOTIC FEATURES (H): Primary | ICD-10-CM

## 2024-07-22 PROCEDURE — 99207 PR NO BILLABLE SERVICE THIS VISIT: CPT | Mod: 95 | Performed by: SOCIAL WORKER

## 2024-07-23 ENCOUNTER — TRANSFERRED RECORDS (OUTPATIENT)
Dept: HEALTH INFORMATION MANAGEMENT | Facility: CLINIC | Age: 69
End: 2024-07-23
Payer: MEDICARE

## 2024-07-24 NOTE — TELEPHONE ENCOUNTER
Attempted to give pt pre procedure instructions for   Her upcoming Right Heart Cath.  Pt was very nervous about it and wanted to cancel the appt and speak with Dr Edwards about it, but pt will have a PAC eval tomorrow for the RHC with Dr Dejesus and I will call to see if they are able to discuss some things with her to set her at ease.  If not, we will cancel RHC and keep the follow-up appt with Grace and do labs prior to see what the best course of action is for her.  Pt is very overwhelmed with all of her appts.    Miki Richardson RN on 7/24/2024 at 12:26 PM      Pre-procedure instructions - Right Heart Cath     Patient Education    Your arrival time is 1030am on Tuesday 7/30.  Location is [address] Waiting Room  Please plan on being at the hospital all day.  At any time, emergencies and/or urgent cases may come up which could delay the start of your procedure.    Pre-procedure instructions - Right heart catheterization  No solid food for 8 hours prior  Nothing to drink 2 hours prior to arrival time  You can take your morning medications (except diabetic and blood thinners) with sips of water  We recommend you arrange for a ride to drop you off and pick you up, in the instance, you are unable to drive home, however you should be able to function as you normally would after the procedure              Anticoagulation Medication Instructions   NA  Nurse to write N/A if not currently taking    In person follow-up with Dr Edwards on 8/6/24 at 12:30pm.

## 2024-07-24 NOTE — PROGRESS NOTES
Transition Clinic Progress Note    Patient Name:   Randi Cleary Date: 2024          Service Type: Individual      VISIT TIME START:       VISIT TIME END: 1220      Session Length:  TC Session Length: 45 (38-52 Minutes)    Session #:  4    Attendees: TC Attendees: Client alone    Service Modality:  Service Modality: Video Visit:      Provider verified identity through the following two step process.  Patient provided:  Patient  and Patient address    Telemedicine Visit: The patient's condition can be safely assessed and treated via synchronous audio and visual telemedicine encounter.      Reason for Telemedicine Visit: Patient has requested telehealth visit    Originating Site (Patient Location): Patient's home    Distant Site (Provider Location): Provider Remote Setting- Home Office    Consent:  The patient/guardian has verbally consented to: the potential risks and benefits of telemedicine (video visit) versus in person care; bill my insurance or make self-payment for services provided; and responsibility for payment of non-covered services.     Patient would like the video invitation sent by:  My Chart    Mode of Communication:  Video Conference via Amwell    Distant Location (Provider):  Off-site    As the provider I attest to compliance with applicable laws and regulations related to telemedicine.    Informed Consent and Assessment Methods    Provided at intake on 7/10/2024      DATA  Interactive Complexity: No  Crisis: No        Progress Since Last Session (Related to Symptoms / Goals / Homework):   Symptoms: No change tearful. Medical concerns overwhelm coping  Homework: Completed in session      Episode of Care Goals: Satisfactory progress - PREPARATION (Decided to change - considering how); Intervened by negotiating a change plan and determining options / strategies for behavior change, identifying triggers, exploring social supports, and working towards setting a date to begin  behavior change     Current / Ongoing Stressors and Concerns:   Winnie is tearful today.  She has multiple health issues and this week symptoms have increased, appt's. aren't working out as well as she wanted and she is overwhelmed, deceasing her ability to cope. ECT had some problems with the needle puncture in her arm. ECT is physically draining for her and causes cloudy thought and memory issues.  Her reported symptoms  of depression continues to be severe. Sleep continues to be poor leaving her more tired and emotional during the day.  She states she need a new C-pap mask but has to have another sleep study which she states is difficult for her because she wont be марина to fall asleep.  Winnie expressed frustration and understanding of why sometimes someone might want to die.  She denies any suicidal plan or intent. A safety plan has been completed and updated. SI is passive in response to medical issues.Discussed therapy Bridging plan.  Randi will remain with this writer until she can bridge to [facility] Erika Alcantar on 9/6/2024.  Winnie will continue to see this therapist until she can bridge to [facility].    Winnie is scheduled with Dr. Constantino but not until Oct 22 for an annual exam.  This therapist assisted her in scheduling an appt with Dr. Constantino, to address a health issue with the earliest on 10/8/2024.  This therapist advised her to contact [facility] and  ask if there is an earlier appt. set aside and/or be placed on a wait list.  She complains of Gout which she believes has caused curled, perhaps atrophied fingers which required more immediate medical attention.  This Therapist recommended seeing another doctor at [facility]. Winnie will try to arrange an earlier appt. With Dr. Constantino first.     Treatment Objective(s) Addressed in This Session:   Decrease frequency and intensity of feeling down, depressed, hopeless  Feel less tired and more energy during the day   Refer  back to the sleep clinic and to   Vira who will be her primary provider .       Intervention:   Solution Focused Brief Therapy:    Brief Psychotherapy - discussed and prioritizing the most efficient treatment. and Solution-Focused Brief Therapy (SFBT) - Change is perpetual, focus on the problematic excerptions. Reality is subjective and social constructed through conversation.    Assessments completed prior to visit:  The following assessments were completed by patient for this visit:  PHQ9:       4/30/2024     9:38 AM 5/20/2024     8:42 AM 6/6/2024     8:00 AM 6/27/2024    12:47 PM 7/2/2024     8:23 AM 7/10/2024     9:24 AM 7/17/2024     9:52 AM   PHQ-9 SCORE   PHQ-9 Total Score MyChart 17 (Moderately severe depression) 15 (Moderately severe depression)  12 (Moderate depression) 13 (Moderate depression) 13 (Moderate depression) 14 (Moderate depression)   PHQ-9 Total Score 17    17 15    15 16 12 13 13    13    13 14     GAD7:       4/4/2024     8:52 AM 4/18/2024    12:34 PM 5/15/2024    11:25 AM 6/6/2024     8:00 AM 6/27/2024    12:48 PM 7/1/2024    12:10 PM 7/17/2024     9:55 AM   CHAVO-7 SCORE   Total Score 13 (moderate anxiety) 17 (severe anxiety) 9 (mild anxiety)  11 (moderate anxiety)  10 (moderate anxiety)   Total Score 13    13    13    13 17    17 9    9    9    9 9 11    11    11    11    11 11 10     CAGE-AID:       6/22/2020     7:12 PM 1/18/2022    11:15 PM 6/6/2024     8:00 AM   CAGE-AID Total Score   Total Score 4 4 4   Total Score MyChart 4 (A total score of 2 or greater is considered clinically significant) 4 (A total score of 2 or greater is considered clinically significant)      PROMIS 10-Global Health (only subscores and total score):       1/22/2024    12:00 PM 5/1/2024    11:53 AM 5/15/2024    11:27 AM 6/6/2024     8:00 AM 7/1/2024    12:10 PM 7/10/2024     9:53 AM 7/17/2024    10:01 AM   PROMIS-10 Scores Only   Global Mental Health Score 5 7    7    7 7    7 8 8 9    9    9    9    9    9 6   Global Physical Health Score 9  "9    9    9 8    8 8 11 11    11    11    11    11    11 9   PROMIS TOTAL - SUBSCORES 14 16    16    16 15    15 16 19 20    20    20    20    20    20 15     [place] Suicide Severity Rating Scale (Lifetime/Recent)      5/5/2020     9:21 AM 6/11/2020    10:00 AM 1/9/2023     1:00 PM 5/21/2024     4:49 PM 5/21/2024     9:00 PM 6/6/2024     8:00 AM 7/10/2024    12:00 PM   [place] Suicide Severity Rating (Lifetime/Recent)   Q1 Wish to be Dead (Lifetime) Yes Yes   Yes     Comments \"When I was going through a couple of  years of counseling from a dysfunctional family about 30 years ago or more\" when patient was in treatment and there were family concerns   OD on medications     Q2 Non-Specific Active Suicidal Thoughts (Lifetime) Yes Yes   No     Non-Specific Active Suicidal Thought Description (Lifetime) Had thoughts of killing self         Most Severe Ideation Rating (Lifetime) 5 5   5     Most Severe Ideation Description (Lifetime) 35 years ago \"I just wanted to check out , I had thought of it so much and wanted to be done\" when family problems were happening   OD on medication     Frequency (Lifetime) 4    3     Duration (Lifetime) 2    3     Controllability (Lifetime) 3 0   2     Protective Factors  (Lifetime) 2 5   4     Reasons for Ideation (Lifetime) 5    5     Q1 Wished to be Dead (Past Month)   yes 1-->yes 1-->yes 1-->yes    Q2 Suicidal Thoughts (Past Month)   no 1-->yes 1-->yes 1-->yes    Q3 Suicidal Thought Method   no 0-->no 0-->no 1-->yes    Q4 Suicidal Intent without Specific Plan   no 1-->yes 0-->no 0-->no    Q5 Suicide Intent with Specific Plan   no 0-->no 0-->no 1-->yes    Q6 Suicide Behavior (Lifetime)   yes 1-->yes 0-->no 1-->yes    If yes to Q6, within past 3 months?   no 1-->yes 0-->no 0-->no    Level of Risk per Screen   moderate risk high risk moderate risk high risk    RETIRED: 1. Wish to be Dead (Recent) No No        RETIRED: 2. Non-Specific Active Suicidal Thoughts (Recent) No No        3. " Active Suicidal Ideation with any Methods (Not Plan) Without Intent to Act (Lifetime) Yes Yes        RETIRE: Active Suicidal Ideation with any Methods (Not Plan) Description (Lifetime) Took many pills of Prozac and was sent to the ED 30 years prior        RETIRED: 3. Active Suicidal Ideation with any Methods (Not Plan) Without Intent to Act (Recent) No         RETIRE: 4. Active Suicidal Ideation with Some Intent to Act, Without Specific Plan (Lifetime) Yes Yes        RETIRE: Active Suicidal Ideation with Some Intent to Act, Without Specific Plan Description (Lifetime) Took many pills of Prozac and was sent to the ED         4. Active Suicidal Ideation with Some Intent to Act, Without Specific Plan (Recent) No         RETIRE: 5. Active Suicidal Ideation with Specific Plan and Intent (Lifetime) Yes Yes        RETIRE: Active Suicidal Ideation with Specific Plan and Intent Description (Lifetime) Took many pills of Prozac and was sent to the ED over 30 years ago         RETIRED: 5. Active Suicidal Ideation with Specific Plan and Intent (Recent) No         Most Severe Ideation Rating (Past Month) NA         Frequency (Past Month) NA         Duration (Past Month) NA         Controllability (Past Month) NA         Protective Factors (Past Month) NA         Reasons for Ideation (Past Month) NA         Actual Attempt (Lifetime) Yes Yes        Actual Attempt Description (Lifetime) Took a bunch of pills patient reported overdosing on Prozac about thirty years ago        Total Number of Actual Attempts (Lifetime) 1 1        Actual Attempt (Past 3 Months) No No        Has subject engaged in non-suicidal self-injurious behavior? (Lifetime) Yes Yes        Has subject engaged in non-suicidal self-injurious behavior? (Past 3 Months) No No        Interrupted Attempts (Lifetime) No No        Interrupted Attempts (Past 3 Months) No No        Aborted or Self-Interrupted Attempt (Lifetime) No No        Total Number Aborted or Self  Interrupted Attempts (Lifetime) 0         Aborted or Self-Interrupted Attempt (Past 3 Months) No No        Preparatory Acts or Behavior (Lifetime) No No        Preparatory Acts or Behavior (Past 3 Months) No No        Most Recent Attempt Date 10/1/1984         Comments  30 years prior        Most Recent Attempt Actual Lethality Code 2 0        Comments This is a guess/pt. doesn't recall exact month or day         Most Lethal Attempt Actual Lethality Code 2         Comments only 1 attempt/same attempt as most recent & initial         Initial/First Attempt Date 10/1/1984         Initial/First Attempt Actual Lethality Code 2         Q1 Wish to be Dead (Lifetime)       N   Q2 Non-Specific Active Suicidal Thoughts (Lifetime)       N     [place] Suicide Severity Rating Scale (Short Version)      8/12/2020     3:00 PM 12/21/2021    11:31 AM 1/9/2023     1:00 PM 5/21/2024     4:49 PM 5/21/2024     9:00 PM 6/6/2024     8:00 AM 7/1/2024    12:00 PM   [place] Suicide Severity Rating (Short Version)   Over the past 2 weeks have you felt down, depressed, or hopeless? yes yes        Over the past 2 weeks have you had thoughts of killing yourself? no no        Have you ever attempted to kill yourself? no no        Q1 Wished to be Dead (Past Month)   yes 1-->yes 1-->yes 1-->yes    Q2 Suicidal Thoughts (Past Month)   no 1-->yes 1-->yes 1-->yes    Q3 Suicidal Thought Method   no 0-->no 0-->no 1-->yes    Q4 Suicidal Intent without Specific Plan   no 1-->yes 0-->no 0-->no    Q5 Suicide Intent with Specific Plan   no 0-->no 0-->no 1-->yes    Q6 Suicide Behavior (Lifetime)   yes 1-->yes 0-->no 1-->yes    If yes to Q6, within past 3 months?   no 1-->yes 0-->no 0-->no    Level of Risk per Screen   moderate risk high risk moderate risk high risk    1. Wish to be Dead (Since Last Contact)       Y   2. Non-Specific Active Suicidal Thoughts (Since Last Contact)       Y   Calculated C-SSRS Risk Score (Since Last Contact)       Low Risk          ASSESSMENT: Current Emotional / Mental Status (status of significant symptoms):   Risk status (Self / Other harm or suicidal ideation)   Patient denies current fears or concerns for personal safety.   Patient reports the following current or recent suicidal ideation or behaviors: passive ideations related to health concerns overwhelming her ability to cope.   Patient denies current or recent homicidal ideation or behaviors.   Patient denies current or recent self injurious behavior or ideation.   Patient denies other safety concerns.   Patient reports there has been no change in risk factors since their last session.     Patient reports there has been no change in protective factors since their last session.     A safety and risk management plan has been developed including: Virgiliodevora Barba in her health record and attached to this note.     Appearance:   Looks quite tired, dark circles under her eyes, puffy lids due to tearfulness.    Eye Contact:   Good    Psychomotor Behavior: Normal    Attitude:   Cooperative  Attentive   Orientation:   Person Place Time Situation   Speech    Rate / Production: Emotional Normal     Volume:  Normal    Mood:    Depressed    Affect:    Appropriate    Thought Content:  Clear    Thought Form:  Coherent  Goal Directed    Insight:    Good      Medication Review:  No psychotropic meds listed but requires review by MD.   The patient states she had serotonin syndrome   Patient sees Dr. Arnaldo Varela, psychiatry and Dr. Dusty Dubois at the pain clinic and is using medical medical cannabis which can either increase anxiety or reduce symptoms.      Medication Compliance:   Yes     Changes in Health Issues:   Yes: increase in symptoms; physical pain and inadequate sleep.     Chemical Use Review:   Substance Use: Chemical use reviewed, no active concerns identified   Denied     Tobacco Use: No current tobacco use.      Diagnoses:   296.33 (F33.2) Major Depressive Disorder, Recurrent  Episode, Severe _  300.02 (F41.1) Generalized Anxiety Disorder    Collateral Reports Completed:   TC Collateral: [facility] electronic medical records reviewed. and Routed note to Dr. Laith Constantino    PLAN: (Patient Tasks / Therapist Tasks / Other)  Route note to PCP  Winnie to contact medical clinic to schedule medical appt to assess possible finger curl/atrophie.   3.   Contact the sleep clinic to discuss alternatives to sleep study.    4.   Follow through with [facility] re: Orthopedics and ankle needs.   5.   Return for follow up with this therapist on 7/31/2024, [facility] will call to schedule.    Is this the patient's last discharge?: No    Procedure Code: Psychotherapy (with patient) - 45 [CPT 34200]    Archana Blanton, [facility]                                                         ______________________________________________________________________    Treatment Plan    Patient's Name: Randi Cleary  YOB: 1955  Date of Creation: 7/22/2024  Date Treatment Plan Last Reviewed/Revised: new    DSM5 Diagnoses: 296.33 (F33.2) Major Depressive Disorder, Recurrent Episode, Severe _ or 300.02 (F41.1) Generalized Anxiety Disorder  Psychosocial / Contextual Factors: history of leola issues that impact quality of life.    PROMIS (reviewed every 90 days): 20    Referral / Collaboration:  Follow-up with medical team .    Anticipated number of session for this episode of care: 6+ sessions as needed until bridged to [facility]  Anticipation frequency of session: Weekly to biweekly   Anticipated Duration of each session: 38-52 minutes  Treatment plan will be reviewed in 90 days or when goals have been changed.       MeasurableTreatment Goal(s) related to diagnosis / functional impairment(s)    Goal 1: Patient will increase awareness of depression symptoms and their impact on functioning and develop skills to reduce negative impact      I will know I've met my goal when I am able to better manage my  mood.         Objective #A (Patient Action)     Patient will describe thoughts, feelings, and actions associated with depression.                 Status: New - Date:  7/22/2024       Intervention(s)     Therapist will explore and process  patient triggers for depression and how depression has   impacted them.        Objective #B     Patient will increase depression coping skills and reduce suicidal ideations.   Status: New - Date:  7/22/2024         Intervention(s)     Therapist will teach mindfulness skills and model in session      I will know I've met my goal when I can manage and cope with depression and I no   longer experience suicidal ideations.     Objective #C  Bridge patient to [facility] for long term Therapy  Status: New - Date: 7/22/2024    Intervention(s)?    Facilitate Bridging ot [facility] Erika Colorado on 9/6/2024    Goal?2:?Patient will?experience a decrease in anxiety symptoms, as measured by a?2 point?reduction in her CHAVO-7 score.?  11  I will?know I've met my goal when I feel less stress and less worry.     Objective #A?(Patient Action)??  Patient will?identify?3?fears / thoughts that contribute to feeling anxious.?     Status: New - Date: 7/22/2024    Intervention(s)?     Therapist will?provide cognitive behavioral therapeutic approach in processing patient's thoughts, feelings and beliefs and toward assisting patient in developing greater awareness of unhelpful thoughts that associate with increased?anxiety.??       Objective #B  Bridge patient to [facility] for long term Therapy   Status: New - Date: 7/22/2024    Intervention(s)?    Facilitate Bridging to [facility] Erika Colorado on 9/6/2024    Therapist and patient discussed the goals for therapy and the interventions in therapy session.    Archana Blanton, [facility]  July 24, 2024    __________________________________________________________________________________________  Virg[place] Safety Plan    Creation Date: 6/4/24       Step 1: Warning  signs:  Warning Signs   Start going down rabbit hole, thinking about the past, negative and positive memories   Missing my father   Anger / rage   Not taking care of myself      Step 2: Internal coping strategies - Things I can do to take my mind off my problems without contacting another person:  Strategies   Spirituality   Listening to music   Cat - [place]   Listening to Sidney play music   Gardening   Swimming      Step 3: People and social settings that provide distraction:  Name Contact Information   Vania Little    [facility]   [employer]   Going for walks outside      Step 4: People whom I can ask for help during a crisis:  Name Contact Information   Vania Little       Step 5: Professionals or agencies I can contact during a crisis:  Clinician/Agency Name Phone Emergency Contact   [facility] Crisis Line     [place] [facility]        Suicide Prevention Lifeline Phone: Call or Text 836  Crisis Text Line: Text HOME to 040614     Step 6: Making the environment safer (plan for lethal means safety):  Maybe have Sidney help with / hold onto medications.     Optional: What is most important to me and worth living for?:  A second chance. Writer a book. Painting. Gardening. Sidney and Clifford. Getting a therapy dog.     Reggie Safety Plan. Randi Poole and Robby Barba. Used with permission of the authors.

## 2024-07-25 ENCOUNTER — PRE VISIT (OUTPATIENT)
Dept: SURGERY | Facility: CLINIC | Age: 69
End: 2024-07-25

## 2024-07-25 ENCOUNTER — VIRTUAL VISIT (OUTPATIENT)
Dept: INTERNAL MEDICINE | Facility: CLINIC | Age: 69
End: 2024-07-25
Payer: MEDICARE

## 2024-07-25 DIAGNOSIS — F41.1 GENERALIZED ANXIETY DISORDER: Primary | ICD-10-CM

## 2024-07-25 DIAGNOSIS — J44.9 CHRONIC OBSTRUCTIVE PULMONARY DISEASE, UNSPECIFIED COPD TYPE (H): ICD-10-CM

## 2024-07-25 DIAGNOSIS — F10.21 ALCOHOL USE DISORDER, MODERATE, IN SUSTAINED REMISSION (H): ICD-10-CM

## 2024-07-25 DIAGNOSIS — L40.50 PSORIATIC ARTHROPATHY (H): ICD-10-CM

## 2024-07-25 DIAGNOSIS — E11.9 TYPE 2 DIABETES MELLITUS WITHOUT COMPLICATION, WITHOUT LONG-TERM CURRENT USE OF INSULIN (H): ICD-10-CM

## 2024-07-25 DIAGNOSIS — F51.01 PRIMARY INSOMNIA: ICD-10-CM

## 2024-07-25 DIAGNOSIS — R41.3 MEMORY LOSS: ICD-10-CM

## 2024-07-25 DIAGNOSIS — M10.9 URIC ACID ARTHROPATHY: ICD-10-CM

## 2024-07-25 DIAGNOSIS — L98.9 SKIN LESION: ICD-10-CM

## 2024-07-25 DIAGNOSIS — G25.81 RESTLESS LEGS SYNDROME (RLS): ICD-10-CM

## 2024-07-25 DIAGNOSIS — K44.9 HIATAL HERNIA: ICD-10-CM

## 2024-07-25 DIAGNOSIS — E89.0 POSTABLATIVE HYPOTHYROIDISM: ICD-10-CM

## 2024-07-25 DIAGNOSIS — R63.4 WEIGHT LOSS: ICD-10-CM

## 2024-07-25 DIAGNOSIS — I27.20 PULMONARY HYPERTENSION (H): ICD-10-CM

## 2024-07-25 PROBLEM — R73.03 PREDIABETES: Status: RESOLVED | Noted: 2019-11-19 | Resolved: 2024-07-25

## 2024-07-25 PROBLEM — R45.851 SUICIDAL IDEATION: Status: RESOLVED | Noted: 2024-05-21 | Resolved: 2024-07-25

## 2024-07-25 PROBLEM — F11.20 OPIOID TYPE DEPENDENCE, CONTINUOUS (H): Status: ACTIVE | Noted: 2023-03-16

## 2024-07-25 PROCEDURE — 99215 OFFICE O/P EST HI 40 MIN: CPT | Mod: 95 | Performed by: INTERNAL MEDICINE

## 2024-07-25 PROCEDURE — G2211 COMPLEX E/M VISIT ADD ON: HCPCS | Mod: 95 | Performed by: INTERNAL MEDICINE

## 2024-07-25 RX ORDER — ALLOPURINOL 300 MG/1
300 TABLET ORAL EVERY MORNING
Status: SHIPPED
Start: 2024-07-25

## 2024-07-25 RX ORDER — IMIQUIMOD 12.5 MG/.25G
CREAM TOPICAL
Qty: 8 PACKET | Refills: 3 | Status: SHIPPED | OUTPATIENT
Start: 2024-07-25 | End: 2024-08-06

## 2024-07-25 RX ORDER — LEVOTHYROXINE SODIUM 150 MCG
150 TABLET ORAL EVERY MORNING
Status: SHIPPED
Start: 2024-07-25

## 2024-07-25 RX ORDER — ROPINIROLE 2 MG/1
2 TABLET, FILM COATED ORAL 3 TIMES DAILY
Qty: 270 TABLET | Refills: 3 | Status: SHIPPED | OUTPATIENT
Start: 2024-07-25 | End: 2025-07-25

## 2024-07-25 RX ORDER — MEMANTINE HYDROCHLORIDE 5 MG/1
5 TABLET ORAL 2 TIMES DAILY
Qty: 180 TABLET | Refills: 3 | Status: SHIPPED | OUTPATIENT
Start: 2024-07-25 | End: 2024-08-21

## 2024-07-25 RX ORDER — POTASSIUM CHLORIDE 1500 MG/1
20 TABLET, EXTENDED RELEASE ORAL EVERY MORNING
Status: SHIPPED
Start: 2024-07-25 | End: 2024-08-21

## 2024-07-25 NOTE — PROGRESS NOTES
OFFICE VISIT--Video    aRndi is a 68 year old female contacting the clinic today via video, who will use the platform: [employer] for the visit.  Phone # for [employer], or if Amwell drops:   Telephone Information:   Mobile [phone]          ASSESSMENT and PLAN:  1. Generalized anxiety disorder  Consider mirtazapine.  Psychiatry visit within the next few days.  Electroshock therapy reviewed    2. Memory loss  Trial of memantine  - memantine (NAMENDA) 5 MG tablet; Take 1 tablet (5 mg) by mouth 2 times daily  Dispense: 180 tablet; Refill: 3    3. Postablative hypothyroidism  Stable TSH  - SYNTHROID 150 MCG tablet; Take 1 tablet (150 mcg) by mouth every morning MON to SAT 1 tablet/day; SUN 0.5 tablet    4. Restless legs syndrome (RLS)  Clarify and refill  - rOPINIRole (REQUIP) 2 MG tablet; Take 1 tablet (2 mg) by mouth 3 times daily One tab 3 pm, one bedtime, and one during night on waking  Dispense: 270 tablet; Refill: 3    5. Hiatal hernia  Refill  - omeprazole (PRILOSEC) 20 MG DR capsule; Take 1 capsule (20 mg) by mouth every morning  Dispense: 90 capsule; Refill: 3    6. Pulmonary hypertension (H)  Continue potassium.  With minimal symptoms would avoid repeat angiogram  - KLOR-CON M20 20 MEQ CR tablet; Take 1 tablet (20 mEq) by mouth every morning    7. Chronic obstructive pulmonary disease, unspecified COPD type (H)  Clarify  - Fluticasone-Umeclidin-Vilanterol (TRELEGY ELLIPTA) 200-62.5-25 MCG/ACT oral inhaler; Inhale 1 puff into the lungs every morning    8. Uric acid arthropathy  Continue.  Uric acid controlled.  Trial of memantine for weather related  - allopurinol (ZYLOPRIM) 300 MG tablet; Take 1 tablet (300 mg) by mouth every morning  - memantine (NAMENDA) 5 MG tablet; Take 1 tablet (5 mg) by mouth 2 times daily  Dispense: 180 tablet; Refill: 3    9. Weight loss  Somewhat concerning.  Diabetes controlled.  Thyroid within normal range.  Suspect anxiety.  Consider mirtazapine    10. Alcohol use disorder,  moderate, in sustained remission (H)  No alcohol for 2 years    11. Type 2 diabetes mellitus without complication, without long-term current use of insulin (H)  - STATIN NOT PRESCRIBED (INTENTIONAL); Please choose reason not prescribed from choices below.    12. Primary insomnia  Consider mirtazapine    13. Psoriatic arthropathy (H)  Trial of memantine  - memantine (NAMENDA) 5 MG tablet; Take 1 tablet (5 mg) by mouth 2 times daily  Dispense: 180 tablet; Refill: 3    14. Skin lesion  Trial of Aldara  - imiquimod (ALDARA) 5 % external cream; Apply topically twice weekly at bedtime to face or scalp (but not both) and wash off after 8 hours. May use for up to 16 weeks.  Dispense: 8 packet; Refill: 3       Patient Instructions   Medicine list clarified    Refill omeprazole and Requip    Memantine 5 mg twice daily    Consider trial of mirtazapine    Hospitalization for ECT in May reviewed    Labs reviewed    Colonoscopy upcoming    Recommend surgery on finger            Return in about 6 months (around 1/25/2025) for using a video visit.       CHIEF COMPLAINT:  Chief Complaint   Patient presents with    Hand Pain       HISTORY OF PRESENT ILLNESS:  Randi is a 68 year old female contacting the clinic today via video for review.  She is tearful and occasionally dabbing her eyes.  Rambling sometimes incoherent issues as she reviews electroconvulsive therapy, hospitalization in May, weight loss, laxity of her hand and fingers, arthritis pain, gout pain, weather related changes, memory loss.  Has lost weight and is now down to 175 pounds.  Believes this is due to anxiety or depression    Is undergone 1 heart cath showing pulmonary hypertension.  Was scheduled for second which she canceled.  With weight loss, breathing has been quite a bit better    Hospitalized from May until June for 1 month on psychiatry.  Underwent ECT 3 times weekly and is now improving to maintenance    Has not had alcohol for 2 years    Current weight  "175 pounds.  Was just 194 pounds earlier this month    History of Present Illness       Reason for visit:  Gout    She eats 2-3 servings of fruits and vegetables daily.She consumes 0 sweetened beverage(s) daily.She exercises with enough effort to increase her heart rate 9 or less minutes per day.  She exercises with enough effort to increase her heart rate 3 or less days per week.   She is taking medications regularly.      REVIEW OF SYSTEMS:   Some trouble with sleep.  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  LMP  (LMP Unknown)  Estimated body mass index is 31.31 kg/m  as calculated from the following:    Height as of 7/2/24: 1.676 m (5' 6\").    Weight as of 7/2/24: 88 kg (194 lb).    PHYSICAL EXAM:  (observations via Video)  Occasionally tearful.  Flight of ideas.  No obvious intoxication.  Brace on left hand.  Marked laxity of third finger left hand    MEDICATIONS:   Current Outpatient Medications   Medication Sig Dispense Refill    albuterol (VENTOLIN HFA) 108 (90 Base) MCG/ACT inhaler Inhale 2 puffs into the lungs every 6 hours as needed for shortness of breath, wheezing or cough 18 g 11    allopurinol (ZYLOPRIM) 300 MG tablet Take 1 tablet (300 mg) by mouth every morning      bisacodyl (DULCOLAX) 5 MG EC tablet Take 2 tablets at 3 pm the day before your procedure. If your procedure is before 11 am, take 2 additional tablets at 11 pm. If your procedure is after 11 am, take 2 additional tablets at 6 am. For additional instructions refer to your colonoscopy prep " instructions. 4 tablet 0    Cyanocobalamin (VITAMIN B-12) 5000 MCG SUBL Place 2-3 sprays under the tongue daily Unknown dose. 2 or 3 sprays/day      ethacrynic acid (EDECRIN) 25 MG tablet Half pill every day (Patient taking differently: Take 0.5 tablets by mouth every morning Half pill every day) 45 tablet 3    Fluticasone-Umeclidin-Vilanterol (TRELEGY ELLIPTA) 200-62.5-25 MCG/ACT oral inhaler Inhale 1 puff into the lungs every morning      gabapentin (NEURONTIN) 100 MG capsule Take 1 capsule (100 mg) by mouth every 6 hours as needed (anxiety) 120 capsule 1    gabapentin (NEURONTIN) 400 MG capsule Take 1 capsule (400 mg) by mouth at bedtime 30 capsule 1    guaiFENesin (MUCINEX) 600 MG 12 hr tablet Take 1,200 mg by mouth 2 times daily as needed for congestion      imiquimod (ALDARA) 5 % external cream Apply topically twice weekly at bedtime to face or scalp (but not both) and wash off after 8 hours. May use for up to 16 weeks. 8 packet 3    KLOR-CON M20 20 MEQ CR tablet Take 1 tablet (20 mEq) by mouth every morning      MAGNESIUM PO Take 1 capsule by mouth 2 times daily Strength unknown      medical cannabis (Patient's own supply) See Admin Instructions (The purpose of this order is to document that the patient reports taking medical cannabis.  This is not a prescription, and is not used to certify that the patient has a qualifying medical condition.)  Flower      melatonin 3 MG tablet Take 1 tablet (3 mg) by mouth nightly as needed for sleep 30 tablet 1    memantine (NAMENDA) 5 MG tablet Take 1 tablet (5 mg) by mouth 2 times daily 180 tablet 3    naloxone (NARCAN) 4 MG/0.1ML nasal spray Spray 1 spray (4 mg) into one nostril alternating nostrils as needed for opioid reversal every 2-3 minutes until assistance arrives 0.2 mL 0    omeprazole (PRILOSEC) 20 MG DR capsule Take 1 capsule (20 mg) by mouth every morning 90 capsule 3    oxyCODONE (ROXICODONE) 5 MG tablet Take 1 tablet (5 mg) by mouth 5 times daily May  dispense/start 5/8/24 225 tablet 0    polyethylene glycol (GOLYTELY) 236 g suspension The night before the exam at 6 pm drink an 8-ounce glass every 15 minutes until the jug is half empty. If you arrive before 11 AM: Drink the other half of the Golytely jug at 11 PM night before procedure. If you arrive after 11 AM: Drink the other half of the Golytely jug at 6 AM day of procedure. For additional instructions refer to your colonoscopy prep instructions. 4000 mL 0    rOPINIRole (REQUIP) 2 MG tablet Take 1 tablet (2 mg) by mouth 3 times daily One tab 3 pm, one bedtime, and one during night on waking 270 tablet 3    STATIN NOT PRESCRIBED (INTENTIONAL) Please choose reason not prescribed from choices below.      SYNTHROID 150 MCG tablet Take 1 tablet (150 mcg) by mouth every morning MON to SAT 1 tablet/day; SUN 0.5 tablet      vitamin C (ASCORBIC ACID) 1000 MG TABS Take 1,000 mg by mouth daily      vitamin D3 (CHOLECALCIFEROL) 250 mcg (27540 units) capsule Take 1 capsule by mouth once a week         Outside Notes summarized:   Labs, x-rays, cardiology, GI tests reviewed:   Recent Labs   Lab Test 06/04/24  1652 06/04/24  1226 05/26/24  1306 05/22/24  0802 05/22/24  0801 05/21/24  1738 03/29/24  1428 01/22/24  0941 01/22/24  0940 12/29/23  1542 10/17/23  1410 09/25/23  1018 07/25/23  1643   HGB  --  17.0*  --   --  17.7* 17.7* 17.2*  --  17.2*  --  18.3*   < > 16.6*   WBC  --  7.9  --   --  7.1 7.9 7.6  --  6.3  --  8.1   < > 7.6   NA  --  138  --   --  142 140 139  --  142  --  142  --   --    POTASSIUM 4.3 5.2  --   --  3.8 3.8 4.8  --  4.5  --  4.1  --   --    CR  --  0.53  --   --  0.57 0.57 0.59  --  0.59  --  0.67   < >  --    A1C  --   --   --  5.6  --   --   --  5.6  --   --  5.7*  --   --    URIC  --   --  2.8  --   --   --  3.5  --  4.3  --  3.4   < >  --    B12  --   --   --   --  746  --   --   --   --   --   --   --  987   TSH  --  1.69  --   --   --  0.58 1.31  --   --    < > 0.24*  --   --    VITDT  --   --    --   --  42  --   --   --   --   --   --   --  38   SED  --   --   --   --   --   --   --   --   --   --  8  --   --    CRPI  --   --   --   --   --   --   --   --  3.60  --  <3.00  --   --     < > = values in this interval not displayed.     Lab Results   Component Value Date    KNJBQ89BNP Negative 06/04/2024    [id number] POSITIVE 06/04/2021     Lab Results   Component Value Date    CHOL 183 05/22/2024    CHOL 180 05/18/2021     New orders: No orders of the defined types were placed in this encounter.      Independent review of:   Patient would like to receive their AVS by [employer]    Video-Visit Details  Type of service:  Video Visit      7/25/2024    10:34 AM   Additional Questions   Roomed by Missy MILES CMA     Patient has given verbal consent to a Video visit?  Yes  How would you like to obtain your AVS?  [employer]  Will anyone else be joining your video visit, giving supplemental history? No    Originating location (pt location): Home    Distant Location (provider location):  Off-site    Video Start Time: 11:46 AM  Video End time:  12:24 PM  Face to face plus orders: 38 minutes  Documentation time:  3 minutes     The visit lasted a total of 41 minutes    Laith Constantino MD  Internal Medicine  [facility]

## 2024-07-25 NOTE — PROGRESS NOTES
"Winnie is a 68 year old who is being evaluated via a billable video visit.    {ROOMING STAFF complete during rooming of virtual visit (Optional):735298}  {If patient encounters technical issues they should call [phone] :013049}    {PROVIDER CHARTING PREFERENCE:868864}    Subjective   Winnie is a 68 year old, presenting for the following health issues:  Hand Pain      7/25/2024    10:34 AM   Additional Questions   Roomed by Missy MILES CMA     Via the Health Maintenance questionnaire, the patient has reported the following services have been completed -Mammogram: [facility] 2023-09-01, this information has not been sent to the abstraction team.  Video Start Time: {video visit start/end time for provider to select:938394}    Hand Pain    History of Present Illness       Reason for visit:  Gout    She eats 2-3 servings of fruits and vegetables daily.She consumes 0 sweetened beverage(s) daily.She exercises with enough effort to increase her heart rate 9 or less minutes per day.  She exercises with enough effort to increase her heart rate 3 or less days per week.   She is taking medications regularly.       {SUPERLIST (Optional):887460}  {additonal problems for provider to add (Optional):359336}    {ROS Picklists (Optional):127252}      Objective           Vitals:  No vitals were obtained today due to virtual visit.    Physical Exam   {video visit exam brief selected:222785}    {Diagnostic Test Results (Optional):366777}      Video-Visit Details    Type of service:  Video Visit   Video End Time:{video visit start/end time for provider to select:831645}  Originating Location (pt. Location): {video visit patient location:868072::\"Home\"}  {PROVIDER LOCATION On-site should be selected for visits conducted from your clinic location or adjoining [facility] hospital, academic office, or other nearby [facility] building. Off-site should be selected for all other provider locations, including home:194281}  Distant Location (provider location):  " "{virtual location provider:580315}  Platform used for Video Visit: {Virtual Visit Platforms:672057::\"[employer]\"}  Signed Electronically by: Laith Constantino MD  {Email feedback regarding this note to [email or web address]   :060959}  "

## 2024-07-25 NOTE — PATIENT INSTRUCTIONS
Medicine list clarified    Refill omeprazole and Requip    Memantine 5 mg twice daily    Consider trial of mirtazapine    Hospitalization for ECT in May reviewed    Labs reviewed    Colonoscopy upcoming    Recommend surgery on finger

## 2024-07-26 ENCOUNTER — TELEPHONE (OUTPATIENT)
Dept: CARDIOLOGY | Facility: CLINIC | Age: 69
End: 2024-07-26
Payer: MEDICARE

## 2024-07-26 NOTE — TELEPHONE ENCOUNTER
Called to give pt pre procedure instructions.  Pt decided she was unable to go through with the procedure [facility] and she will cancel.  Spoke to scheduling team, and will cancel.  Instead she will see Dr Edwards on 8/6 with labs prior and talk to him about his thoughts on the procedure and if it's needed or not.    Miki Richardson RN on 7/26/2024 at 2:52 PM

## 2024-07-29 ENCOUNTER — TELEPHONE (OUTPATIENT)
Dept: CARDIOLOGY | Facility: CLINIC | Age: 69
End: 2024-07-29
Payer: MEDICARE

## 2024-07-29 ENCOUNTER — OFFICE VISIT (OUTPATIENT)
Dept: PSYCHIATRY | Facility: CLINIC | Age: 69
End: 2024-07-29
Payer: MEDICARE

## 2024-07-29 VITALS
DIASTOLIC BLOOD PRESSURE: 77 MMHG | HEART RATE: 103 BPM | WEIGHT: 178 LBS | BODY MASS INDEX: 28.73 KG/M2 | SYSTOLIC BLOOD PRESSURE: 122 MMHG

## 2024-07-29 DIAGNOSIS — F33.1 MODERATE EPISODE OF RECURRENT MAJOR DEPRESSIVE DISORDER (H): Primary | ICD-10-CM

## 2024-07-29 DIAGNOSIS — F41.1 GAD (GENERALIZED ANXIETY DISORDER): ICD-10-CM

## 2024-07-29 PROCEDURE — 99214 OFFICE O/P EST MOD 30 MIN: CPT

## 2024-07-29 PROCEDURE — G0463 HOSPITAL OUTPT CLINIC VISIT: HCPCS

## 2024-07-29 PROCEDURE — G2211 COMPLEX E/M VISIT ADD ON: HCPCS

## 2024-07-29 RX ORDER — VILAZODONE HYDROCHLORIDE 10 MG/1
10 TABLET ORAL
Qty: 30 TABLET | Refills: 0 | Status: SHIPPED | OUTPATIENT
Start: 2024-07-29 | End: 2024-08-06

## 2024-07-29 ASSESSMENT — PAIN SCALES - GENERAL: PAINLEVEL: SEVERE PAIN (7)
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
## 2024-07-29 NOTE — Clinical Note
Stella Zhou was agreeable with starting antidepressant treatment today although she remains somewhat hesitant. We are starting Viibryd 10mg daily. Will consider going up to 20mg in a few weeks but thought she would be more likely to continue with the med if she tolerates it. I think Auvelity could be a good option in the future if she doesn't tolerate Viibryd. I'm hoping we won't have an ins problem with Viibryd now that it is available generic - one of the reasons for going this route.  Dr. Constantino- I saw your note about mirtazapine. Winnie is fearful of weight gain with med use so chose to avoid mirtazapine at this time.  Thanks, Jeannette 
Statement Selected

## 2024-07-29 NOTE — PATIENT INSTRUCTIONS
Medication Plan  -Continue gabapentin 400mg at bedtime and 100mg Q6H PRN for anxiety  -Start Viibryd 10mg daily with food    **For crisis resources, please see the information at the end of this document**   Patient Education    Thank you for coming to the [facility].     Lab Testing:  If you had lab testing today and your results are reassuring or normal they will be mailed to you or sent through [employer] within 7 days. If the lab tests need quick action we will call you with the results. The phone number we will call with results is # [phone]. If this is not the best number please call our clinic and change the number.     Medication Refills:  If you need any refills please call your pharmacy and they will contact us. Our fax number for refills is [phone].   Three business days of notice are needed for general medication refill requests.   Five business days of notice are needed for controlled substance refill requests.   If you need to change to a different pharmacy, please contact the new pharmacy directly. The new pharmacy will help you get your medications transferred.     Contact Us:  Please call [phone] during business hours (8-5:00 M-F).   If you have medication related questions after clinic hours, or on the weekend, please call [phone].     Financial Assistance [phone]   Medical Records [phone]       MENTAL HEALTH CRISIS RESOURCES:  For a emergency help, please call 911 or go to the nearest Emergency Department.     Emergency Walk-In Options:   [facility] Unit @ [facility] ([place]): [phone] - Specialized mental health emergency area designed to be calming  [facility] ([place]): [phone]  [facility] ([place]): [phone]  [facility]): [phone]    [facility] Crisis Information:   [place]: [phone]  [place]: [phone]  [place]) -  Adult: [phone]     Child: [phone]  [place] - Adult: [phone]     Child: [phone]  [place]: [phone]  List of all [facility] resources:   https://[place].gov/dhs/people-we-serve/adults/health-care/mental-health/resources/crisis-contacts.jsp    National Crisis Information:   Crisis Text Line: Text  [place]  to 537633  Suicide & Crisis Lifeline: 988  National Suicide Prevention Lifeline: 9-510-216-TALK (1-495.330.7366)       For online chat options, visit https://suicidepreventionlifeline.org/chat/  Poison Control Center: 1-841.634.3561  Trans Lifeline: 1-205.147.4603 - Hotline for transgender people of all ages  [employer]: 2-254-228-3578 - Hotline for LGBT youth     For Non-Emergency Support:   Fast Tracker: Mental Health & Substance Use Disorder Resources -   https://www.TCHOckermn.org/

## 2024-07-29 NOTE — NURSING NOTE
Chief Complaint   Patient presents with    Recheck Medication     CHAVO (generalized anxiety disorder)     - Ruperto Paulson, Visit Facilitator

## 2024-07-29 NOTE — TELEPHONE ENCOUNTER
[facility] Call Center    Phone Message    May a detailed message be left on voicemail: yes     Reason for Call: Other: Patient called to cancel and possibly reschedule her Heart Cath Right Heart Cath [[id number]] that is for tomorrow. Please call back to further coordinate.     Action Taken: Message routed to:  Other: Cardiology    Travel Screening: Not Applicable     Thank you!  [facility]

## 2024-07-29 NOTE — TELEPHONE ENCOUNTER
Spoke with patient and cancelled RHC from her Appt Desk. Message sent to Candace Jarrett to remove from snapboard.     Patient confirmed she would like to keep next week's appt as an inperson visit. Verbalized understanding and did not have any additional questions at this time. Bindu Walden RN on 7/29/2024 at 10:20 AM

## 2024-07-31 ENCOUNTER — VIRTUAL VISIT (OUTPATIENT)
Dept: BEHAVIORAL HEALTH | Facility: CLINIC | Age: 69
End: 2024-07-31
Payer: COMMERCIAL

## 2024-07-31 DIAGNOSIS — F33.1 MAJOR DEPRESSIVE DISORDER, RECURRENT EPISODE, MODERATE (H): ICD-10-CM

## 2024-07-31 DIAGNOSIS — F41.1 GENERALIZED ANXIETY DISORDER: Primary | ICD-10-CM

## 2024-07-31 PROCEDURE — 99207 PR NO CHARGE LOS: CPT | Mod: 95 | Performed by: SOCIAL WORKER

## 2024-08-01 ENCOUNTER — OFFICE VISIT (OUTPATIENT)
Dept: PALLIATIVE MEDICINE | Facility: OTHER | Age: 69
End: 2024-08-01
Attending: ANESTHESIOLOGY
Payer: MEDICARE

## 2024-08-01 VITALS
DIASTOLIC BLOOD PRESSURE: 73 MMHG | WEIGHT: 170 LBS | SYSTOLIC BLOOD PRESSURE: 104 MMHG | HEART RATE: 82 BPM | BODY MASS INDEX: 27.44 KG/M2 | OXYGEN SATURATION: 92 %

## 2024-08-01 DIAGNOSIS — Z79.891 LONG TERM (CURRENT) USE OF OPIATE ANALGESIC: Primary | ICD-10-CM

## 2024-08-01 DIAGNOSIS — M25.50 PAIN IN JOINT, MULTIPLE SITES: ICD-10-CM

## 2024-08-01 LAB
CANNABINOIDS UR QL SCN: ABNORMAL
CREAT UR-MCNC: 110 MG/DL
ETHANOL UR QL SCN: NORMAL

## 2024-08-01 PROCEDURE — 99215 OFFICE O/P EST HI 40 MIN: CPT | Performed by: ANESTHESIOLOGY

## 2024-08-01 PROCEDURE — 80353 DRUG SCREENING COCAINE: CPT | Performed by: ANESTHESIOLOGY

## 2024-08-01 PROCEDURE — 80307 DRUG TEST PRSMV CHEM ANLYZR: CPT | Performed by: ANESTHESIOLOGY

## 2024-08-01 PROCEDURE — G0463 HOSPITAL OUTPT CLINIC VISIT: HCPCS | Performed by: ANESTHESIOLOGY

## 2024-08-01 PROCEDURE — 80359 METHYLENEDIOXYAMPHETAMINES: CPT | Performed by: ANESTHESIOLOGY

## 2024-08-01 ASSESSMENT — PAIN SCALES - GENERAL: PAINLEVEL: EXTREME PAIN (8)

## 2024-08-01 NOTE — LETTER

## 2024-08-01 NOTE — PROGRESS NOTES
[facility] Follow-up    Date of visit: 8/1/2024    Chief complaint:   Chief Complaint   Patient presents with    Pain     Follow-up for history of arthralgias, cervical radiculopathy.  Interval history:    Chart reflects complex care for her mental health concerns.  Had a psychiatric hospitalization, a course of transcranial magnetic stimulation which may have had some benefit, though with loss of psychotherapist and ongoing psychosocial stressors had relapse, with hospitalization involvement in the electroconvulsive therapy program, presently in the maintenance phase going to 2 weeks.    Has been involved intensive outpatient group therapies, working with individual therapist.    Reviewing the  was given prescription for oxycodone 5 mg #225 in June, 2 psychiatry providerat 5 mg 5 and a day.  Continues on gabapentin 100 mg 4 times a day prescribed by psychiatry    She reviews today challenges with managing all her medical appointments.  Has been doing electroconvulsive therapy having 17 treatments now going to 2-week intervals.  Has been helpful for her mood and likes the benefit, describes the treatments can have some challenges, difficulties with IV access in her right arm, sparing her left arm with a history of breast cancer surgery.    Other medical problems including history of pulmonary hypertension, was to have scheduled for right heart cath.  She will see that provider next week to determine if she needs to continue.  No has lost weight from 205 in March down to 175, blood pressure lower.  Attributes in part to walking more on the inpatient unit.    Has been working with a nutritionist, trying to follow a diet regarding gout.    She did see [facility], evaluated for her hand developing trigger finger, and for her shoulder.  She has a follow-up next month.    Has been involved in an interventional group therapy, likes her present therapist,  will transition to a new 1 in September.  Reviews understanding of her previous psychotherapist Mary Kay Gomez leaving the practice.    Discontinue the oxycodone 5 mg using 5 and a day, and tends to take 2 at a time twice with better benefit.    Was seen by the rheumatologist at [facility], told she does not have autoimmune condition rather osteoarthritis.    She has been working with the [employer]   Device finding that helpful, use that while in the hospital as well.    At our last visit we discussed some other agent to help with arthritis including cetyl neurostimulator and methylene blue.  She would like to pursue those.  We discussed the use.      Last urine drug test more than 1 year ago also will be obtained    Medications:  Current Outpatient Medications   Medication Sig Dispense Refill    albuterol (VENTOLIN HFA) 108 (90 Base) MCG/ACT inhaler Inhale 2 puffs into the lungs every 6 hours as needed for shortness of breath, wheezing or cough 18 g 11    allopurinol (ZYLOPRIM) 300 MG tablet Take 1 tablet (300 mg) by mouth every morning      bisacodyl (DULCOLAX) 5 MG EC tablet Take 2 tablets at 3 pm the day before your procedure. If your procedure is before 11 am, take 2 additional tablets at 11 pm. If your procedure is after 11 am, take 2 additional tablets at 6 am. For additional instructions refer to your colonoscopy prep instructions. 4 tablet 0    Cyanocobalamin (VITAMIN B-12) 5000 MCG SUBL Place 2-3 sprays under the tongue daily Unknown dose. 2 or 3 sprays/day      ethacrynic acid (EDECRIN) 25 MG tablet Half pill every day (Patient taking differently: Take 0.5 tablets by mouth every morning Half pill every day) 45 tablet 3    Fluticasone-Umeclidin-Vilanterol (TRELEGY ELLIPTA) 200-62.5-25 MCG/ACT oral inhaler Inhale 1 puff into the lungs every morning      gabapentin (NEURONTIN) 100 MG capsule Take 1 capsule (100 mg) by mouth every 6 hours as needed (anxiety) 120 capsule 1    gabapentin (NEURONTIN) 400 MG  capsule Take 1 capsule (400 mg) by mouth at bedtime 30 capsule 1    guaiFENesin (MUCINEX) 600 MG 12 hr tablet Take 1,200 mg by mouth 2 times daily as needed for congestion      imiquimod (ALDARA) 5 % external cream Apply topically twice weekly at bedtime to face or scalp (but not both) and wash off after 8 hours. May use for up to 16 weeks. 8 packet 3    KLOR-CON M20 20 MEQ CR tablet Take 1 tablet (20 mEq) by mouth every morning      MAGNESIUM PO Take 1 capsule by mouth 2 times daily Strength unknown      medical cannabis (Patient's own supply) See Admin Instructions (The purpose of this order is to document that the patient reports taking medical cannabis.  This is not a prescription, and is not used to certify that the patient has a qualifying medical condition.)  Flower      melatonin 3 MG tablet Take 1 tablet (3 mg) by mouth nightly as needed for sleep 30 tablet 1    memantine (NAMENDA) 5 MG tablet Take 1 tablet (5 mg) by mouth 2 times daily 180 tablet 3    naloxone (NARCAN) 4 MG/0.1ML nasal spray Spray 1 spray (4 mg) into one nostril alternating nostrils as needed for opioid reversal every 2-3 minutes until assistance arrives 0.2 mL 0    omeprazole (PRILOSEC) 20 MG DR capsule Take 1 capsule (20 mg) by mouth every morning 90 capsule 3    oxyCODONE (ROXICODONE) 5 MG tablet Take 1 tablet (5 mg) by mouth 5 times daily May dispense/start 5/8/24 225 tablet 0    polyethylene glycol (GOLYTELY) 236 g suspension The night before the exam at 6 pm drink an 8-ounce glass every 15 minutes until the jug is half empty. If you arrive before 11 AM: Drink the other half of the Golytely jug at 11 PM night before procedure. If you arrive after 11 AM: Drink the other half of the Golytely jug at 6 AM day of procedure. For additional instructions refer to your colonoscopy prep instructions. 4000 mL 0    rOPINIRole (REQUIP) 2 MG tablet Take 1 tablet (2 mg) by mouth 3 times daily One tab 3 pm, one bedtime, and one during night on waking  270 tablet 3    STATIN NOT PRESCRIBED (INTENTIONAL) Please choose reason not prescribed from choices below.      SYNTHROID 150 MCG tablet Take 1 tablet (150 mcg) by mouth every morning MON to SAT 1 tablet/day; SUN 0.5 tablet      vilazodone (VIIBRYD) 10 MG TABS tablet Take 1 tablet (10 mg) by mouth daily with food 30 tablet 0    vitamin C (ASCORBIC ACID) 1000 MG TABS Take 1,000 mg by mouth daily      vitamin D3 (CHOLECALCIFEROL) 250 mcg (74212 units) capsule Take 1 capsule by mouth once a week             Physical Exam:  Blood pressure 104/73, pulse 82, weight 77.1 kg (170 lb), SpO2 92%, not currently breastfeeding.    She is alert with a clear sensorium.  No respiratory distress, no pain behavior.    Has a splint on her left hand.    There are indeed enlargement of the joints on her fingers, no Heberden's node.  No erythema.    Ambulates with a cane.    Her affect is much wider than when last seen.  Smiles, is not tearful.  No gross cognitive deficits noted.      Assessment:   Osteoarthritis, hands, cervical radiculopathy.  Comorbid gout followed by Dr. Constantino using colchicine and allopurinol.    Has continued on the oxycodone prescribed by others as she has not been here.    Comorbid mood disorder responding to ECT now on maintenance schedule extending the interval.  Working with a new psychotherapist.    Comorbid medical problems as above.    Total time 45   minutes spent on the date of encounter doing chart review, history, and exam documentation and further activities as noted above.     Dusty Dubois MD  [facility]

## 2024-08-01 NOTE — PROGRESS NOTES
Patient presents to the clinic today for a visit with AXEL WIGGINS MD regarding Pain Management.          1/17/2024    10:16 AM 3/20/2024     7:54 AM 8/1/2024     8:47 AM   PEG Score   PEG Total Score 6.33 9.67 6.67       UDS/CSA- 10.18.2023    Medications- Oxycodone (other provider) last taken this am    Notes    Lorena Loo  [facility] Clinical Assistant

## 2024-08-01 NOTE — PROGRESS NOTES
Patient inquired about her weight at her last visit. I came into the chart to find it as I had trouble finding it.     Reported to patient.

## 2024-08-01 NOTE — PATIENT INSTRUCTIONS
PLAN:    Continue the oxycodone 5 mg 5 tablets in a day.  Contact Dr. Dubois when due for refill.    Discussed the use of cetyl myristoleate (CMO) to help with joint pain.  May obtain online through [employer].  Coreceutimin is one  brand.  2 capsules 3 times a day for 4 days, 2 capsules twice a day for 2 weeks, may decrease to 2 capsules daily.    Discussed after that a trial of methylene blue to help with joint pain, contact Dr. Dubois if you would like to try.    You are scheduled to meet with orthopedics this month regarding your hands and shoulders.    You are continuing in the ECT program going to every 2-week intervals.    Will be establishing with a new psychotherapist.    Follow-up with Dr. Dubois for return visit in 8 weeks.

## 2024-08-02 ENCOUNTER — HOSPITAL ENCOUNTER (OUTPATIENT)
Dept: BEHAVIORAL HEALTH | Facility: CLINIC | Age: 69
Discharge: HOME | End: 2024-08-02
Attending: PSYCHIATRY & NEUROLOGY
Payer: MEDICARE

## 2024-08-02 ENCOUNTER — ANESTHESIA EVENT (OUTPATIENT)
Dept: BEHAVIORAL HEALTH | Facility: CLINIC | Age: 69
End: 2024-08-02
Payer: MEDICARE

## 2024-08-02 ENCOUNTER — PATIENT OUTREACH (OUTPATIENT)
Dept: CARE COORDINATION | Facility: CLINIC | Age: 69
End: 2024-08-02
Payer: MEDICARE

## 2024-08-02 ENCOUNTER — ANESTHESIA (OUTPATIENT)
Dept: BEHAVIORAL HEALTH | Facility: CLINIC | Age: 69
End: 2024-08-02
Payer: MEDICARE

## 2024-08-02 VITALS
DIASTOLIC BLOOD PRESSURE: 81 MMHG | OXYGEN SATURATION: 93 % | TEMPERATURE: 97.3 F | SYSTOLIC BLOOD PRESSURE: 128 MMHG | HEART RATE: 84 BPM | RESPIRATION RATE: 16 BRPM

## 2024-08-02 DIAGNOSIS — F33.2 SEVERE RECURRENT MAJOR DEPRESSION WITHOUT PSYCHOTIC FEATURES (H): Primary | ICD-10-CM

## 2024-08-02 PROCEDURE — 370N000017 HC ANESTHESIA TECHNICAL FEE, PER MIN: Performed by: STUDENT IN AN ORGANIZED HEALTH CARE EDUCATION/TRAINING PROGRAM

## 2024-08-02 PROCEDURE — 90870 ELECTROCONVULSIVE THERAPY: CPT | Performed by: PSYCHIATRY & NEUROLOGY

## 2024-08-02 PROCEDURE — 250N000011 HC RX IP 250 OP 636: Performed by: STUDENT IN AN ORGANIZED HEALTH CARE EDUCATION/TRAINING PROGRAM

## 2024-08-02 PROCEDURE — 250N000011 HC RX IP 250 OP 636: Performed by: PSYCHIATRY & NEUROLOGY

## 2024-08-02 PROCEDURE — 250N000009 HC RX 250: Performed by: STUDENT IN AN ORGANIZED HEALTH CARE EDUCATION/TRAINING PROGRAM

## 2024-08-02 PROCEDURE — 90870 ELECTROCONVULSIVE THERAPY: CPT

## 2024-08-02 PROCEDURE — 90870 ELECTROCONVULSIVE THERAPY: CPT | Performed by: STUDENT IN AN ORGANIZED HEALTH CARE EDUCATION/TRAINING PROGRAM

## 2024-08-02 RX ORDER — KETOROLAC TROMETHAMINE 30 MG/ML
30 INJECTION, SOLUTION INTRAMUSCULAR; INTRAVENOUS ONCE
Status: COMPLETED | OUTPATIENT
Start: 2024-08-02 | End: 2024-08-02

## 2024-08-02 RX ORDER — METHOHEXITAL IN WATER/PF 100MG/10ML
SYRINGE (ML) INTRAVENOUS PRN
Status: DISCONTINUED | OUTPATIENT
Start: 2024-08-02 | End: 2024-08-02

## 2024-08-02 RX ORDER — NICARDIPINE HCL-0.9% SOD CHLOR 1 MG/10 ML
SYRINGE (ML) INTRAVENOUS PRN
Status: DISCONTINUED | OUTPATIENT
Start: 2024-08-02 | End: 2024-08-02

## 2024-08-02 RX ORDER — KETOROLAC TROMETHAMINE 30 MG/ML
30 INJECTION, SOLUTION INTRAMUSCULAR; INTRAVENOUS ONCE
Status: CANCELLED
Start: 2024-08-02 | End: 2024-08-02

## 2024-08-02 RX ADMIN — SUCCINYLCHOLINE CHLORIDE 90 MG: 20 INJECTION, SOLUTION INTRAMUSCULAR; INTRAVENOUS; PARENTERAL at 08:02

## 2024-08-02 RX ADMIN — KETOROLAC TROMETHAMINE 30 MG: 30 INJECTION, SOLUTION INTRAMUSCULAR at 07:54

## 2024-08-02 RX ADMIN — Medication 100 MG: at 08:02

## 2024-08-02 RX ADMIN — NICARDIPINE HYDROCHLORIDE 500 MCG: 25 INJECTION INTRAVENOUS at 08:02

## 2024-08-02 ASSESSMENT — COPD QUESTIONNAIRES: COPD: 1

## 2024-08-02 NOTE — ANESTHESIA CARE TRANSFER NOTE
Patient: Randi Cleary    Procedure: * No procedures listed *  Electroconvulsive Therapy    Diagnosis: * No pre-op diagnosis entered *  Diagnosis Additional Information: No value filed.    Anesthesia Type:   General     Note:    Oropharynx: oropharynx clear of all foreign objects and spontaneously breathing  Level of Consciousness: awake and drowsy  Oxygen Supplementation: face mask  Level of Supplemental Oxygen (L/min / FiO2): 4  Independent Airway: airway patency satisfactory and stable  Dentition: dentition unchanged  Vital Signs Stable: post-procedure vital signs reviewed and stable  Report to RN Given: handoff report given  Patient transferred to: PACU    Handoff Report: Identifed the Patient, Identified the Reponsible Provider, Reviewed the pertinent medical history, Discussed the surgical course, Reviewed Intra-OP anesthesia mangement and issues during anesthesia, Set expectations for post-procedure period and Allowed opportunity for questions and acknowledgement of understanding      Vitals:  Vitals Value Taken Time   BP     Temp     Pulse     Resp     SpO2         Electronically Signed By: Gui Malik MD  August 2, 2024  8:10 AM

## 2024-08-02 NOTE — PROCEDURES
"Procedures  [facility] [place]   ECT Procedure Note   08/02/2024    Randi Cleary is a 68 year old female patient.  [id number]    Patient Status: outpatient    Is this the first in a series of 12 treatments?  No      Allergies   Allergen Reactions    Serotonin Reuptake Inhibitors (Ssris) Anxiety, Difficulty breathing, Headache, Palpitations and Shortness Of Breath    Buspirone      The patient states she had serotonin syndrome    Cephalexin      Other reaction(s): unknown rxn.    Desvenlafaxine      Serotonin syndrome    Diclofenac Sodium [Diclofenac]      Serotonin syndrome and restless legs syndrome    Gabapentin      Drove on the wrong side of the highway    Levofloxacin      \"CAN'T REMEMBER\"    Penicillins      \"SORES IN MOUTH\"    Riluzole Difficulty breathing and Swelling    Sulfa Antibiotics      \"PT DOES NOT KNOW WHAT THE REACTION WAS\"    Topiramate Other (See Comments)     Frequent urination       Weight:  0 lbs 0 oz / 0 kg          Indications for ECT:   Medications ineffective and Psychotherapies ineffective         Clinical Narrative:   HPI - The patient describes a lifelong history of depression dating back to elementary school, worsening after her father's death when she was 13yo and especially in the 1980s in the setting of worsening physical health (since falling and breaking her ankle), which led to the the initiation of fluoxetine, her first antidepressant.  Her history has been primarily characterized by low mood, with some ups and downs but no extended depression-free periods since then.  She has tried many different medications from different classes, but cannot recall any one being particularly helpful for her.  She has experienced past episodes of particularly irritable mood and concomitant decreased sleep need, although most of these have lasted 1-2 days and triggered by anger related to external stressors.  She has at least one episode of a more extended " "episode of elevated mood (and associated grandiosity, increased spending, flight of ideas, increased goal directed behaviors, pressured speech, and odd and embarrassing behavior), which occurred in the context of relapse on alcohol in 2021. She does not endorse any events before about age 48.     The patient's depression is characterized by near daily low mood, frequent anhedonia, with sleep difficulties (although c/b pain, KYAW, and RLS), fatigue, feelings of guilt/worthlessness, and impaired concentration.  She reports feelings of \"despair,\" at times with active SI with plan, but denies any intent to act on this - \"maybe it's hope.\"  She has 1 lifetime suicide attempt (overdose) in the 1980s, for which she was psychiatrically hospitalized.  She has a remote history of SIB (cutting) but not recently.       Psych pertinent item history includes includes suicide attempt , suicidal ideation, SIB , aggression, trauma hx, substance use: alcohol, cannabis, and Patient has a history of alcohol dependence treated 20+ years ago and she relapsed in 2020 for a couple of months, in her 20s she\" loved getting high\" on cocaine, LSD, mushrooms, speed, white cross, mutiple psychotropic trials , psych hosp, ketamine, and major medical problems.    Target Symptoms for Improvement: Amotivation, Fatigue, Improved self-image and Panic attacks         Diagnosis:   Major depression         Assessment:   #1 05/24/24 Some circumstantial thought, needs some redirection, 3.5 hours sleep last night, anxious, mood 1/10, PDW but no SI, never had ECT before - only TMS. No AVH.   #2 05/29/24  Mood 4/10, better over w/e, some word find difficulties, no AVH/SI/PDW. Chronic sciatica pain, neck and shoulder pain - didn't worsen with ECT. Mild headache.   #3 05/31/24  Mood 4/10, No SI, PDW, AVH, some wrist pain and headache, memory might be improving.    #4 6/3/24  Phq-9= 13, mood 3/10.  Yesterday was very tearful, still a bit today.  Last treatment " "had awareness under paralysis.  Otherwise, some initial confusion after first ECT but no subsequent cognitive effects.  Signed consent to continue.  #5 6/5/24 Mood 4-5/10  She feels like her \"rage\" is less and she feels lighter. but no cognitive side effects. She complains of excessive sweating and is concerned her thryoid is unbalanced. She is somatically focused She reports pain on the side of her neck radiating down to her chest. She had a migraine she thinks over the weekend which usual starts as occipital tension.  #6 6/7/24 mood 4-5/10. No SI. She does have some baseline long term memory difficulties no AVH.   #7 6/10/24  She still is worried that She is not able to go home. She had visitors this weekend who said \"you don't seem to have the weight of the world on your shoulders anymore\" she disagrees she had a downward spiral over the weekend when there was a mix up with her clothes and she usually feels tearful after ECT. She does not report anything feeling worse. She gets down on herself when she has an anxiety spiral and it was the 1st one she has had since admission.   #8 6/12/24 Winnie reported some tearfulness overnight. She is noticing a weight lifting mood 6/10 . Reports some difficulty with remembering names but believes this is at baseline.   #9 6/14/24 Mood 5/10 (10 best) She feels like she is improving each time . She still has occasional crying spells but she feels she bounces back quicker. She is still anxious about going home. No Si. Tolerating ECT  #10 06/17/24 mood 5/10 She does feellike she has gotten some improvement since starting Ect but still has times where she gets tearful and anxious \"goes down the rabit hole\" but not as often . She has had ketamine troches before with pain  management to no effect but wonders about ketamine infusions.  #11 06/19/24 Mood today is 5/10. Patient is wondering whether she could do a ketamine course (did have ketamine in the past), despite of being on " "opioids. Feels that ketamine can help with \"anger, tearing and anxiety.\" She complains to the anesthesiologist about recent urinary incontinence which needs to be considered when  using ketamine. She wants to try it for anger ,tearing and anxiety which is not a standard indication  #12 06/21/24 Mood 5/10, no PDW/SI, but some anxiety around pain this AM.  Patient is interested in maintenance ECT at her attending psychiatrist's recommendation to prevent the possibility of her mood dipping as low as it did previously that led to her hospitalization.  Discussed pros and cons of maintenance ECT, suggested alternative of maintenance medications/therapy and/or watchful waiting with possibility of retreatment should she relapse, as well as alternate interventions including ketamine.  At the moment, she is most interested in pursuing maintenance ECT - will plan for next Friday pending confirmation with her family that she has post-ECT ride and monitoring.  M1 06/28/24 Mood ups and downs, irritability, anxiety, sadness switching multiple times a day since being discharged home.  Had difficulty with the transition home, was harder than she thought it would be.  However, still able to \"push away\" PDW/SI, and did not have periods of persistently low mood this past week.  Has noticed some anterograde and retrograde amnesia of the 1-2 months prior to starting ECT.  Will continue to track.  Today, mood 6/10 \"now that I'm at ECT.\"  M2 07/05/24 She was tearful, states that she doesn't feel good, \"starting to drop\", states that the mood change happened in Wednesday somewhat suddenly, when she encountered several business stressors and felt overwhelmed. Mood 3-4/10. Denies SI and feels safe at home. Still reports memory issues. Reports that the day program has been overwhelming.    She cancelled her colonoscopy in order to focus on her right heart cath the next week. She feels like she has too many procedures scheduled and pushed off " her sleep study also.  M3 7/12/24 Doing well.  Somewhat stressed witht all the medical appointments she has to coordinate. Her colonoscopy got cancelled because of her upcoming appointment with cardiology. Canceled the outpatient program but interested in doing the group therapy at [facility].   M4 7/19/24 Doing well. Less frustrated and started therapy. Reports short term memory impairment. That said, she is hesitant to space ECT to [id number]  M5 8/2/24 Mood 5-6/10 she struggles some with the interval felling more irritable and tearful the second week. She felt some Si yesterday because she was frustrated and overehwlemd but no SI today. Cogntiively processing speed is affected and does better if she can get a hint. Encouraged her to take her viibryd as prescribed         Pause for the Cause:     Correct patient Yes   Correct procedure/laterality settings: Yes           Intra-Procedure Documentation:     ECT #: 17   Treatment number this series: M5   Total treatment number: 17     Type of ECT:  Right, unilateral ultrabrief    ECT Medications:    Toradol 30mg iv - for headache/myalgia     Brevital: 100 mg (incr from 90 mg as still awake)   Succinyl Choline: 90 mg    BP - nicardipine 1500 mcg     ECT Strip Summary: RUL Titration #3: 38.4 mC   Energy Level:  384.0mC, 0.3 ms, 100 Hz, 8 sec, 800 mA     Motor Seizure Duration: 14 seconds  EEG Seizure Duration: 34 seconds      Time for re-orientation: 8 minutes to A&O x4    Complications: none    Plan:   - Plan for maintenance [id number]. Next 8/16/24  - Monitor depression severity with clinical assessment augmented with PHQ9 every other treatment  - Continue current medications    Discharge instructions:    -- Start your Viibryd to support your recovery and prevent relapse   -- Remember to NOT take gabapentin after 6pm the night before each treatment  -- You will have to check to make sure somebody can drive you to and from the hospital and monitor you for 6 hours after each  treatment  -- Maintenance ECT  every two weeks, then to once every month.  The goal of maintenance ECT is to space out and eventually stop  -- During maintenance, you can't drive for 24 hours after each treatment.    - We discussed other alternatives including trying ketamine through the [facility] or staying on your regular medications and therapy, but at the moment you were most interested in pursuing maintenance    - Per Dr Varlea:   - Consider trial of serotonin modulator (e.g., vilazodone vs. vortioxetine) or novel agent Auvelity  - Consider augmentation with atypical antipsychotic (e.g., quetiapine or aripiprazole), though there are concerns given pre-diabetes        Toan Cotter MD  Department of Psychiatry and Behavioral Sciences

## 2024-08-02 NOTE — ANESTHESIA PREPROCEDURE EVALUATION
Anesthesia Pre-Procedure Evaluation    Patient: Randi Cleary   MRN: [id number] : 1955        Procedure : * No procedures listed *          Past Medical History:   Diagnosis Date    Bipolar 2 disorder (H)     Breast cancer (H)     lumpectomy, radiation, chemo    Chronic pain syndrome     COPD (chronic obstructive pulmonary disease) (H)     asthma    Cord compression (H) 2021    Dizzy     Drug tolerance     opioid    Esophageal reflux     Fatigue     Generalized anxiety disorder     Graves disease     Hemochromatosis 2018    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; H63D not detected    History of breast cancer 2020    Formatting of this note might be different from the original. Created by Conversion  Replacement Utility updated for latest IMO load Formatting of this note might be different from the original. Created by Conversion  Replacement Utility updated for latest IMO load    History of corticosteroid therapy 2019    History of partial adrenalectomy (H24) 2019    History of pheochromocytoma 2019    Hx antineoplastic chemotherapy     Hx of radiation therapy     Hyperlipidaemia     Hypertension     Impaired fasting glucose     Injury of neck, whiplash 07/15/2021    Joint pain     KYAW (obstructive sleep apnea) 2016    Osteopenia     Pheochromocytoma, left 2017    laparoscopically removed    Postablative hypothyroidism 1995    Prediabetes 10/03/2019    by A1c    Psoriasis     Psoriatic arthropathy (H)     Pulmonary hypertension (H)     Right rotator cuff tear     RLS (restless legs syndrome)     on ropinorole    Sacroiliitis (H24)     Serotonin syndrome 2020    [facility] - While on desvenlafaxine 100mg    Snoring     Spinal stenosis     Status post coronary angiogram 10/03/2019    Urinary incontinence     Vitamin B 12 deficiency 2009    Vitamin D deficiency 2010      Past Surgical History:   Procedure Laterality Date    ARTHRODESIS ANKLE      ARTHROPLASTY  ANKLE Right 6/29/2015    Procedure: ARTHROPLASTY ANKLE;  Surgeon: Jason Coughlin MD;  Location: [facility]    ARTHROPLASTY REVISION ANKLE Right 6/29/2015    Procedure: ARTHROPLASTY REVISION ANKLE;  Surgeon: Jason Coughlin MD;  Location: [facility]    BIOPSY BREAST      BREAST BIOPSY, CORE RT/LT      COLONOSCOPY      COLONOSCOPY N/A 2/25/2021    Procedure: COLONOSCOPY;  Surgeon: Guru Elke Tolbert MD;  Location:  GI    CV CORONARY ANGIOGRAM N/A 10/3/2019    Procedure: CV CORONARY ANGIOGRAM;  Surgeon: Bryce Pierre MD;  Location:  HEART CARDIAC CATH LAB    CV RIGHT HEART CATH MEASUREMENTS RECORDED N/A 10/3/2019    Procedure: CV RIGHT HEART CATH;  Surgeon: Bryce Pierre MD;  Location:  HEART CARDIAC CATH LAB    ESOPHAGOSCOPY, GASTROSCOPY, DUODENOSCOPY (EGD), COMBINED N/A 2/25/2021    Procedure: ESOPHAGOGASTRODUODENOSCOPY, WITH BIOPSY;  Surgeon: Guru Elke Tolbert MD;  Location:  GI    EYE SURGERY  2021    HC REMOVE TONSILS/ADENOIDS,<11 Y/O      Description: Tonsillectomy With Adenoidectomy;  Recorded: 04/07/2010;    IR LUMBAR EPIDURAL STEROID INJECTION  10/26/2004    IR LUMBAR EPIDURAL STEROID INJECTION  11/16/2004    IR LUMBAR EPIDURAL STEROID INJECTION  12/21/2004    IR LUMBAR EPIDURAL STEROID INJECTION  6/8/2006    JOINT REPLACEMENT      LAMINOPLASTY CERVICAL POSTERIOR THREE+ LEVELS Left 12/21/2021    Procedure: CERVICAL 3-CERVICAL 6 LEFT OPEN DOOR LAMINOPLASTY AND LEFT CERVICAL 4-5 AND CERVICAL 6-7 POSTERIOR FORAMINOTOMY;  Surgeon: Angela Gregory MD;  Location: [facility] OR    LAPAROSCOPIC ADRENALECTOMY Left 08/02/2017    pheochromocytoma    LAPAROSCOPIC ADRENALECTOMY Left 8/2/2017    Procedure: LAPAROSCOPIC LEFT ADRENALECTOMY, ;  Surgeon: Gab Linares MD;  Location: [facility] OR;  Service:     LENGTHEN TENDON ACHILLES Right 6/29/2015    Procedure: LENGTHEN TENDON ACHILLES;  Surgeon: Jason Coughlin MD;  Location: [facility]     "LUMPECTOMY BREAST      LUMPECTOMY BREAST Left 1994    MAMMOPLASTY REDUCTION Right 2015    [place]    MAMMOPLASTY REDUCTION Right     approx late /    MASTECTOMY      left lumpectomy with axillary node dissection    MASTECTOMY MODIFIED RADICAL      OTHER SURGICAL HISTORY Right     reconstructive breast surgery    OTHER SURGICAL HISTORY      Adrenalectomy for pheochromocytoma    MA MASTECTOMY, MODIFIED RADICAL      Description: Modified Radical Mastectomy Left Breast;  Recorded: 2010;    REPAIR HAMMER TOE Right 2015    Procedure: REPAIR HAMMER TOE;  Surgeon: Jason Coughlin MD;  Location: [facility]    TONSILLECTOMY      TONSILLECTOMY & ADENOIDECTOMY      [facility] ARTHRODESIS,ANKLE,OPEN Right     Description: Ankle Arthrodesis;  Recorded: 2010;      Allergies   Allergen Reactions    Serotonin Reuptake Inhibitors (Ssris) Anxiety, Difficulty breathing, Headache, Palpitations and Shortness Of Breath    Buspirone      The patient states she had serotonin syndrome    Cephalexin      Other reaction(s): unknown rxn.    Desvenlafaxine      Serotonin syndrome    Diclofenac Sodium [Diclofenac]      Serotonin syndrome and restless legs syndrome    Gabapentin      Drove on the wrong side of the highway    Levofloxacin      \"CAN'T REMEMBER\"    Penicillins      \"SORES IN MOUTH\"    Riluzole Difficulty breathing and Swelling    Sulfa Antibiotics      \"PT DOES NOT KNOW WHAT THE REACTION WAS\"    Topiramate Other (See Comments)     Frequent urination      Social History     Tobacco Use    Smoking status: Former     Current packs/day: 0.00     Average packs/day: 2.5 packs/day for 29.2 years (72.9 ttl pk-yrs)     Types: Cigarettes     Start date: 1971     Quit date: 2000     Years since quittin.1     Passive exposure: Past    Smokeless tobacco: Never   Substance Use Topics    Alcohol use: Not Currently     Comment: relapse 2021 sober       Wt Readings from Last 1 Encounters:   24 77.1 kg " (170 lb)        Anesthesia Evaluation   Pt has had prior anesthetic.         ROS/MED HX  ENT/Pulmonary:     (+) sleep apnea,                         COPD,              Neurologic:       Cardiovascular:     (+)  hypertension- -   -  - -                                      METS/Exercise Tolerance:     Hematologic:       Musculoskeletal:       GI/Hepatic:     (+) GERD,                   Renal/Genitourinary:       Endo:     (+)  type II DM,        thyroid problem,     Obesity,       Psychiatric/Substance Use:       Infectious Disease:       Malignancy:       Other:            Physical Exam    Airway        Mallampati: III   TM distance: > 3 FB   Neck ROM: full   Mouth opening: > 3 cm    Respiratory Devices and Support         Dental       (+) Modest Abnormalities - crowns, retainers, 1 or 2 missing teeth      Cardiovascular   cardiovascular exam normal       Rhythm and rate: regular     Pulmonary   pulmonary exam normal                OUTSIDE LABS:  CBC:   Lab Results   Component Value Date    WBC 7.9 06/04/2024    WBC 7.1 05/22/2024    HGB 17.0 (H) 06/04/2024    HGB 17.7 (H) 05/22/2024    HCT 48.1 (H) 06/04/2024    HCT 49.9 (H) 05/22/2024     06/04/2024     05/22/2024     BMP:   Lab Results   Component Value Date     06/04/2024     05/22/2024    POTASSIUM 4.3 06/04/2024    POTASSIUM 5.2 06/04/2024    CHLORIDE 103 06/04/2024    CHLORIDE 104 05/22/2024    CO2 21 (L) 06/04/2024    CO2 24 05/22/2024    BUN 17.3 06/04/2024    BUN 9.3 05/22/2024    CR 0.53 06/04/2024    CR 0.57 05/22/2024    GLC 96 06/04/2024     (H) 05/22/2024     COAGS:   Lab Results   Component Value Date    PTT 34 12/13/2021    INR 0.94 12/13/2021     POC:   Lab Results   Component Value Date     (H) 02/25/2021     HEPATIC:   Lab Results   Component Value Date    ALBUMIN 3.7 06/04/2024    PROTTOTAL 7.2 06/04/2024    ALT 33 06/04/2024    AST 35 06/04/2024    ALKPHOS 80 06/04/2024    BILITOTAL 0.4 06/04/2024      OTHER:   Lab Results   Component Value Date    A1C 5.6 05/22/2024    LINDA 9.5 06/04/2024    MAG 1.9 05/22/2024    TSH 1.69 06/04/2024    T4 1.49 03/29/2024    T3 114 01/18/2023    CRP <2.9 11/16/2021    SED 8 10/17/2023       Anesthesia Plan    ASA Status:  3       Anesthesia Type: General.   Induction: Intravenous.           Consents            Postoperative Care            Comments:               Massimo Calvin MD    I have reviewed the pertinent notes and labs in the chart from the past 30 days and (re)examined the patient.  Any updates or changes from those notes are reflected in this note.

## 2024-08-02 NOTE — DISCHARGE INSTRUCTIONS
ECT Discharge Instructions    After each ECT treatment:    Get plenty of rest. A responsible adult must stay with your for at least 6 hours.  Avoid heavy or risky activities for 24 hours while in maintenance ECT.   Do not drive for at least 24 hours after your treatment while in maintenance ECT.   If you have more than one treatment within one week, do not drive for 7 days after your last treatment.   If you feel light-headed, sit for a few minutes before standing. Have someone help you get up.  If you have nausea (feel sick to your stomach): Drink only clear liquids such as apple juice, ginger ale, broth or 7UP, Be sure to drink plenty of liquids. Move to a normal diet as you feel able.   If you received Toradol, wait 6 hours before taking ibuprofen.  Call your doctor if:   You have a fever over 100F (37.7 C) (taken under the tongue), or a fever that last more than 24 hours.  Your IV site is red, swollen, very painful or is getting more tender.  You have nausea that gets very bad or does not improve.    If you have any symptoms after ECT, tell our staff before your next treatment.  The ECT Department can be reached at [phone].  The ECT Department is open Mondays, Wednesdays and Fridays from 7:00 AM to 2:00 PM.    To speak to a doctor, call:  Your primary care provider or [facility] for Dr. Beavers, Dr. Hutchison or Dr. Cotter PHONE: [phone]; fax: [phone]    New instructions:      -- Start your Viibryd to support your recovery and prevent relapse              -- Remember to NOT take gabapentin after 6pm the night before each treatment  -- You will have to check to make sure somebody can drive you to and from the hospital and monitor you for 6 hours after each treatment  -- Maintenance ECT  every two weeks, then to once every month.  The goal of maintenance ECT is to space out and eventually stop  -- During maintenance, you can't drive for 24 hours after each treatment.     - We discussed other  alternatives including trying ketamine through the [facility] or staying on your regular medications and therapy, but at the moment you were most interested in pursuing maintenance       You have received Toradol today at 7:54 am. Toradol is an NSAID.  Do not take any NSAID's including: Ibuprofen, Naproxen Sodium, Asprin, Advil, Motrin, Aleve, Shannan, etc., until six hours after the above time which would be 1:54 pm. If you have any questions check back with your Physician, or pharmacist.     Covid-19 Testing:  We are no longer requiring covid tests prior to your procedure. If you are ill, please call the ECT department to discuss plan for rescheduling your appointment. [phone]    Please come to the [facility] and check-in with Security before your ECT appointment.  The [facility] is located right next to the Adult Emergency Room.  The address is [address] [place] [address].    After your appointment, you may be picked up at the [facility] entrance, which is located at [address] [place] [address], about 1 block North of the [facility].    Transported by:   Louie Little    Reviewed AVS discharge instructions with:   Louie Little

## 2024-08-02 NOTE — ANESTHESIA POSTPROCEDURE EVALUATION
Patient: Randi Cleary    Procedure: * No procedures listed *  Electroconvulsive Therapy    Anesthesia Type:  General    Note:  Disposition: Outpatient   Postop Pain Control: Uneventful            Sign Out: Well controlled pain   PONV: No   Neuro/Psych: Uneventful            Sign Out: Acceptable/Baseline neuro status   Airway/Respiratory: Uneventful            Sign Out: Acceptable/Baseline resp. status   CV/Hemodynamics: Uneventful            Sign Out: Acceptable CV status; No obvious hypovolemia; No obvious fluid overload   Other NRE:    DID A NON-ROUTINE EVENT OCCUR?            Last vitals:  Vitals:    08/02/24 0734 08/02/24 0811   BP: 131/74 132/66   Pulse: 79 66   Resp: 16 16   Temp: 36.4  C (97.5  F) (!) 35.9  C (96.7  F)   SpO2: 92% 93%       Electronically Signed By: Massimo Calvin MD  August 2, 2024  8:25 AM

## 2024-08-02 NOTE — PROGRESS NOTES
Clinic Care Coordination Contact  [facility]/Voicemail    Clinical Data: Care Coordinator Outreach    Outreach Documentation Number of Outreach Attempt   8/2/2024   9:58 AM 1     Left message on patient's voicemail with call back information and requested return call.    Plan: Care Coordinator will try to reach patient again in 1-2 business days.    Sola Abraham DAMON  Clinic Care Coordination  [facility]  [email or web address]  [phone]

## 2024-08-04 ENCOUNTER — HEALTH MAINTENANCE LETTER (OUTPATIENT)
Age: 69
End: 2024-08-04

## 2024-08-05 ENCOUNTER — TELEPHONE (OUTPATIENT)
Dept: CARDIOLOGY | Facility: CLINIC | Age: 69
End: 2024-08-05
Payer: MEDICARE

## 2024-08-05 LAB
GABAPENTIN UR QL CFM: PRESENT
OXYCODONE UR CFM-MCNC: 1880 NG/ML
OXYCODONE/CREAT UR: 1709 NG/MG {CREAT}
OXYMORPHONE UR CFM-MCNC: 1920 NG/ML
OXYMORPHONE/CREAT UR: 1745 NG/MG {CREAT}

## 2024-08-05 NOTE — TELEPHONE ENCOUNTER
----- Message from Bindu ARROYO sent at 8/5/2024  5:00 PM CDT -----  Regarding: urgent  Hey,     Grace just had a few patients cancel, but he has many doublebooks.     Would you please call Winnie and ask her if we can move up her Grace appt tomorrow to 830am, labs at 8am instead of where she's scheduled now?     If she doesn't want to move that's fine; but always helpful to be reminded... that the later the day goes the more he gets behind.  Morning spots are better to 'get in and get out' - ok to let her know that.    Thanks!   Yuri

## 2024-08-05 NOTE — TELEPHONE ENCOUNTER
8/5/2024 5:31PM Zandra Caballero  Called patient and offered an earlier Return Pulmonary Hypertension appointment with Dr. Edwards on this date 8/6/2024 & time 8:30AM w/ labs prior at 7:15AM at this location [facility], [address], [place] [address]    8/5 Offered earlier [facility] appt w/ Dr. Edwards 8/6, but pt declined. Has concerns about charts changing- not sure what pt is referring to, but told her to bring it up at her appt tomorrow. MJ      Please note there is no guarantee this appointment will be available as it is first come first serve.   Zandra Caballero 8/5/2024 5:31PM

## 2024-08-05 NOTE — PROGRESS NOTES
Transition Clinic Progress Note    Patient Name:   Randi Cleary Date: 2024          Service Type: Individual      VISIT TIME START:       VISIT TIME END:       Session Length:  TC Session Length: 45 (38-52 Minutes)    Session #:  4    Attendees: TC Attendees: Client alone    Service Modality:  Service Modality: Video Visit:      Provider verified identity through the following two step process.  Patient provided:  Patient  and Patient address    Telemedicine Visit: The patient's condition can be safely assessed and treated via synchronous audio and visual telemedicine encounter.      Reason for Telemedicine Visit: Patient has requested telehealth visit    Originating Site (Patient Location): Patient's home    Distant Site (Provider Location): Provider Remote Setting- Home Office    Consent:  The patient/guardian has verbally consented to: the potential risks and benefits of telemedicine (video visit) versus in person care; bill my insurance or make self-payment for services provided; and responsibility for payment of non-covered services.     Patient would like the video invitation sent by:  My Chart    Mode of Communication:  [facility]    Distant Location (Provider):  Off-site    As the provider I attest to compliance with applicable laws and regulations related to telemedicine.    Informed Consent and Assessment Methods    This provider and patient discussed HIPAA, and the limits of confidentiality; including mandated reporting, the possibility of collaborative discussions with patient's primary care provider and the multidisciplinary team in the MH clinic during consultation.  Discussed the no show policy, and the benefits and possible unintended consequences of therapy.  Patient indicated understanding Transition Clinic services are short term, solution focused, until a referral can be made and patient can bridge to long term therapy.  Patient verbally indicated  understanding the informed consent.        DATA  Interactive Complexity: No  Crisis: No        Progress Since Last Session (Related to Symptoms / Goals / Homework):   Symptoms: No change reports continued symptoms of depression  Homework: Completed in session      Episode of Care Goals: Satisfactory progress - PREPARATION (Decided to change - considering how); Intervened by negotiating a change plan and determining options / strategies for behavior change, identifying triggers, exploring social supports, and working towards setting a date to begin behavior change     Current / Ongoing Stressors and Concerns:  Not feeling well physically. I don't want to live like this.  Acknowledge not caring if she dies but denied suicidal plan or intent.  She acknowledged her treatment plan created on June 6, 2024 and states she will follow the plan.   Frustrated with her spouse. States he is unmotivated. Shared the often gaslights her; blaming her for all that goes wrong in their lives.    Unsure she want couples counseling.  Winnie understands she will bridge to long term therapy with Tessie Colorado on September 6, 2024.     Treatment Objective(s) Addressed in This Session:   Decrease frequency and intensity of feeling down, depressed, hopeless  Decrease thoughts that you'd be better off dead or of suicide / self-harm      Cathartic expression of concerns     Intervention:   Solution Focused Brief Therapy: marital conflicts; goals for her marriage.     Brief Psychotherapy - discussed and prioritizing the most efficient treatment. Couples along with individual therapies.     Mindfulness Therapy - Cultivation of present-oriented, non-judgmental attitude. It helps nurtures great awareness, clarity, and acceptance of reality.    Assessments completed prior to visit:  The following assessments were completed by patient for this visit:  PHQ9:       5/20/2024     8:42 AM 6/6/2024     8:00 AM 6/27/2024    12:47 PM 7/2/2024     8:23 AM  "7/10/2024     9:24 AM 7/17/2024     9:52 AM 7/28/2024    11:39 AM   PHQ-9 SCORE   PHQ-9 Total Score MyChart 15 (Moderately severe depression)  12 (Moderate depression) 13 (Moderate depression) 13 (Moderate depression) 14 (Moderate depression) 12 (Moderate depression)   PHQ-9 Total Score 15    15 16 12 13 13    13    13 14 12     GAD7:       4/4/2024     8:52 AM 4/18/2024    12:34 PM 5/15/2024    11:25 AM 6/6/2024     8:00 AM 6/27/2024    12:48 PM 7/1/2024    12:10 PM 7/17/2024     9:55 AM   CHAVO-7 SCORE   Total Score 13 (moderate anxiety) 17 (severe anxiety) 9 (mild anxiety)  11 (moderate anxiety)  10 (moderate anxiety)   Total Score 13    13    13    13 17    17 9    9    9    9 9 11    11    11    11    11 11 10     PROMIS 10-Global Health (only subscores and total score):       1/22/2024    12:00 PM 5/1/2024    11:53 AM 5/15/2024    11:27 AM 6/6/2024     8:00 AM 7/1/2024    12:10 PM 7/10/2024     9:53 AM 7/17/2024    10:01 AM   PROMIS-10 Scores Only   Global Mental Health Score 5 7    7    7 7    7 8 8 9    9    9    9    9    9 6   Global Physical Health Score 9 9    9    9 8    8 8 11 11    11    11    11    11    11 9   PROMIS TOTAL - SUBSCORES 14 16    16    16 15    15 16 19 20    20    20    20    20    20 15     [place] Suicide Severity Rating Scale (Lifetime/Recent)      5/5/2020     9:21 AM 6/11/2020    10:00 AM 1/9/2023     1:00 PM 5/21/2024     4:49 PM 5/21/2024     9:00 PM 6/6/2024     8:00 AM 7/10/2024    12:00 PM   [place] Suicide Severity Rating (Lifetime/Recent)   Q1 Wish to be Dead (Lifetime) Yes Yes   Yes     Comments \"When I was going through a couple of  years of counseling from a dysfunctional family about 30 years ago or more\" when patient was in treatment and there were family concerns   OD on medications     Q2 Non-Specific Active Suicidal Thoughts (Lifetime) Yes Yes   No     Non-Specific Active Suicidal Thought Description (Lifetime) Had thoughts of killing self         Most Severe " "Ideation Rating (Lifetime) 5 5   5     Most Severe Ideation Description (Lifetime) 35 years ago \"I just wanted to check out , I had thought of it so much and wanted to be done\" when family problems were happening   OD on medication     Frequency (Lifetime) 4    3     Duration (Lifetime) 2    3     Controllability (Lifetime) 3 0   2     Protective Factors  (Lifetime) 2 5   4     Reasons for Ideation (Lifetime) 5    5     Q1 Wished to be Dead (Past Month)   yes 1-->yes 1-->yes 1-->yes    Q2 Suicidal Thoughts (Past Month)   no 1-->yes 1-->yes 1-->yes    Q3 Suicidal Thought Method   no 0-->no 0-->no 1-->yes    Q4 Suicidal Intent without Specific Plan   no 1-->yes 0-->no 0-->no    Q5 Suicide Intent with Specific Plan   no 0-->no 0-->no 1-->yes    Q6 Suicide Behavior (Lifetime)   yes 1-->yes 0-->no 1-->yes    If yes to Q6, within past 3 months?   no 1-->yes 0-->no 0-->no    Level of Risk per Screen   moderate risk high risk moderate risk high risk    RETIRED: 1. Wish to be Dead (Recent) No No        RETIRED: 2. Non-Specific Active Suicidal Thoughts (Recent) No No        3. Active Suicidal Ideation with any Methods (Not Plan) Without Intent to Act (Lifetime) Yes Yes        RETIRE: Active Suicidal Ideation with any Methods (Not Plan) Description (Lifetime) Took many pills of Prozac and was sent to the ED 30 years prior        RETIRED: 3. Active Suicidal Ideation with any Methods (Not Plan) Without Intent to Act (Recent) No         RETIRE: 4. Active Suicidal Ideation with Some Intent to Act, Without Specific Plan (Lifetime) Yes Yes        RETIRE: Active Suicidal Ideation with Some Intent to Act, Without Specific Plan Description (Lifetime) Took many pills of Prozac and was sent to the ED         4. Active Suicidal Ideation with Some Intent to Act, Without Specific Plan (Recent) No         RETIRE: 5. Active Suicidal Ideation with Specific Plan and Intent (Lifetime) Yes Yes        RETIRE: Active Suicidal Ideation with Specific " Plan and Intent Description (Lifetime) Took many pills of Prozac and was sent to the ED over 30 years ago         RETIRED: 5. Active Suicidal Ideation with Specific Plan and Intent (Recent) No         Most Severe Ideation Rating (Past Month) NA         Frequency (Past Month) NA         Duration (Past Month) NA         Controllability (Past Month) NA         Protective Factors (Past Month) NA         Reasons for Ideation (Past Month) NA         Actual Attempt (Lifetime) Yes Yes        Actual Attempt Description (Lifetime) Took a bunch of pills patient reported overdosing on Prozac about thirty years ago        Total Number of Actual Attempts (Lifetime) 1 1        Actual Attempt (Past 3 Months) No No        Has subject engaged in non-suicidal self-injurious behavior? (Lifetime) Yes Yes        Has subject engaged in non-suicidal self-injurious behavior? (Past 3 Months) No No        Interrupted Attempts (Lifetime) No No        Interrupted Attempts (Past 3 Months) No No        Aborted or Self-Interrupted Attempt (Lifetime) No No        Total Number Aborted or Self Interrupted Attempts (Lifetime) 0         Aborted or Self-Interrupted Attempt (Past 3 Months) No No        Preparatory Acts or Behavior (Lifetime) No No        Preparatory Acts or Behavior (Past 3 Months) No No        Most Recent Attempt Date 10/1/1984         Comments  30 years prior        Most Recent Attempt Actual Lethality Code 2 0        Comments This is a guess/pt. doesn't recall exact month or day         Most Lethal Attempt Actual Lethality Code 2         Comments only 1 attempt/same attempt as most recent & initial         Initial/First Attempt Date 10/1/1984         Initial/First Attempt Actual Lethality Code 2         Q1 Wish to be Dead (Lifetime)       N   Q2 Non-Specific Active Suicidal Thoughts (Lifetime)       N     [place] Suicide Severity Rating Scale (Short Version)      8/12/2020     3:00 PM 12/21/2021    11:31 AM 1/9/2023     1:00 PM  5/21/2024     4:49 PM 5/21/2024     9:00 PM 6/6/2024     8:00 AM 7/1/2024    12:00 PM   [place] Suicide Severity Rating (Short Version)   Over the past 2 weeks have you felt down, depressed, or hopeless? yes yes        Over the past 2 weeks have you had thoughts of killing yourself? no no        Have you ever attempted to kill yourself? no no        Q1 Wished to be Dead (Past Month)   yes 1-->yes 1-->yes 1-->yes    Q2 Suicidal Thoughts (Past Month)   no 1-->yes 1-->yes 1-->yes    Q3 Suicidal Thought Method   no 0-->no 0-->no 1-->yes    Q4 Suicidal Intent without Specific Plan   no 1-->yes 0-->no 0-->no    Q5 Suicide Intent with Specific Plan   no 0-->no 0-->no 1-->yes    Q6 Suicide Behavior (Lifetime)   yes 1-->yes 0-->no 1-->yes    If yes to Q6, within past 3 months?   no 1-->yes 0-->no 0-->no    Level of Risk per Screen   moderate risk high risk moderate risk high risk    1. Wish to be Dead (Since Last Contact)       Y   2. Non-Specific Active Suicidal Thoughts (Since Last Contact)       Y   Calculated C-SSRS Risk Score (Since Last Contact)       Low Risk         ASSESSMENT: Current Emotional / Mental Status (status of significant symptoms):   Risk status (Self / Other harm or suicidal ideation)   Patient denies current fears or concerns for personal safety.  Patient reports the following current or recent suicidal ideation or behaviors: continuing sadness leading to thoughts she would be ok if her life ended..   Patient denies current or recent homicidal ideation or behaviors.   Patient denies current or recent self injurious behavior or ideation.   Patient denies other safety concerns.   Patient reports there has been no change in risk factors since their last session.     Patient reports there has been no change in protective factors since their last session.     A safety and risk management plan has been developed including: Patient consented to co-developed safety plan on 6/4/2024.  Safety and risk  management plan was reviewed.   Patient agreed to use safety plan should any safety concerns arise.  A copy was made available to the patient.     Appearance:   Appropriate    Eye Contact:   Fair    Psychomotor Behavior: Normal    Attitude:   Cooperative    Orientation:   All   Speech    Rate / Production: Normal     Volume:  Normal    Mood:    Depressed    Affect:    Appropriate  Tearful   Thought Content:  Clear    Thought Form:  Coherent  Goal Directed    Insight:    Good      Medication Review:   No changes to current psychiatric medication(s)     Medication Compliance:   Yes     Changes in Health Issues:   None reported     Chemical Use Review:   Substance Use: Chemical use reviewed, no active concerns identified      Tobacco Use: No current tobacco use.      Diagnoses:   296.33 (F33.2) Major Depressive Disorder, Recurrent Episode, Severe _  300.02 (F41.1) Generalized Anxiety Disorder    Collateral Reports Completed:    Collateral: [employer] electronic medical records reviewed. and No Collateral obtained.    PLAN: (Patient Tasks / Therapist Tasks / Other)  Patient to discharge from Transition Clinic 8/14/2024 and bridge to [facility], Tessie [facility].    Is this the patient's last discharge?: No    Procedure Code: Psychotherapy (with patient) - 45 [CPT 83419]    Archana Blanton, [facility]                                                         ______________________________________________________________________    Treatment Plan     Patient's Name: Randi Cleary             YOB: 1955  Date of Creation: 7/22/2024  Date Treatment Plan Last Reviewed/Revised: new     DSM5 Diagnoses: 296.33 (F33.2) Major Depressive Disorder, Recurrent Episode, Severe _ or 300.02 (F41.1) Generalized Anxiety Disorder  Psychosocial / Contextual Factors: history of leola issues that impact quality of life.     PROMIS (reviewed every 90 days): 20     Referral / Collaboration:  Follow-up with medical team .      Anticipated number of session for this episode of care: 6+ sessions as needed until bridged to [facility]  Anticipation frequency of session: Weekly to biweekly   Anticipated Duration of each session: 38-52 minutes  Treatment plan will be reviewed in 90 days or when goals have been changed.         MeasurableTreatment Goal(s) related to diagnosis / functional impairment(s)     Goal 1: Patient will increase awareness of depression symptoms and their impact on functioning and develop skills to reduce negative impact      I will know I've met my goal when I am able to better manage my mood.          Objective #A (Patient Action)     Patient will describe thoughts, feelings, and actions associated with depression.                 Status: New - Date:  7/22/2024       Intervention(s)     Therapist will explore and process  patient triggers for depression and how depression has   impacted them.        Objective #B     Patient will increase depression coping skills and reduce suicidal ideations.   Status: New - Date:  7/22/2024         Intervention(s)     Therapist will teach mindfulness skills and model in session      I will know I've met my goal when I can manage and cope with depression and I no   longer experience suicidal ideations.      Objective #C  Bridge patient to [facility] for long term Therapy  Status: New - Date: 7/22/2024     Intervention(s)?    Facilitate Bridging ot [facility] Erika Colorado on 9/6/2024  Review safety plan in session/patient to follow her safety plan     Goal?2:?Patient will?experience a decrease in anxiety symptoms, as measured by a?2 point?reduction in her CHAVO-7 score.?  11  I will?know I've met my goal when I feel less stress and less worry.      Objective #A?(Patient Action)??  Patient will?identify?3?fears / thoughts that contribute to feeling anxious.?     Status: New - Date: 7/22/2024     Intervention(s)?     Therapist will?provide cognitive behavioral therapeutic approach in processing patient's  thoughts, feelings and beliefs and toward assisting patient in developing greater awareness of unhelpful thoughts that associate with increased?anxiety.??        Objective #B  Bridge patient to [facility] for long term Therapy   Status: New - Date: 7/22/2024     Intervention(s)?    Facilitate Bridging to [facility] Erika [place] on 9/6/2024     Therapist and patient discussed the goals for therapy and the interventions in therapy session.     Archana Blanton, [facility]                   July 24, 2024     __________________________________________________________________________________________  [place] Safety Plan     Creation Date: 6/4/24        Step 1: Warning signs:  Warning Signs   Start going down rabbit hole, thinking about the past, negative and positive memories   Missing my father   Anger / rage   Not taking care of myself      Step 2: Internal coping strategies - Things I can do to take my mind off my problems without contacting another person:  Strategies   Spirituality   Listening to music   Cat - [place]   Listening to Sidney play music   Gardening   Swimming      Step 3: People and social settings that provide distraction:  Name Contact Information   Vania Patiño        Places   [facility] pool   Going for walks outside      Step 4: People whom I can ask for help during a crisis:  Name Contact Information   Vania Little        Step 5: Professionals or agencies I can contact during a crisis:  Clinician/Agency Name Phone Emergency Contact   [facility] Crisis Line       [place] [facility]          Suicide Prevention Lifeline Phone: Call or Text 698  Crisis Text Line: Text HOME to 644765     Step 6: Making the environment safer (plan for lethal means safety):  Maybe have Sidney help with / hold onto medications.     Optional: What is most important to me and worth living for?:  A second chance. Writer a book. Painting. Gardening. Sidney and Clifford. Getting a therapy dog.     Reggie  Safety Plan. Randi Poole and Robby Barba. Used with permission of the authors.

## 2024-08-06 ENCOUNTER — LAB (OUTPATIENT)
Dept: LAB | Facility: CLINIC | Age: 69
End: 2024-08-06
Payer: MEDICARE

## 2024-08-06 ENCOUNTER — OFFICE VISIT (OUTPATIENT)
Dept: CARDIOLOGY | Facility: CLINIC | Age: 69
End: 2024-08-06
Attending: INTERNAL MEDICINE
Payer: MEDICARE

## 2024-08-06 VITALS
DIASTOLIC BLOOD PRESSURE: 79 MMHG | WEIGHT: 174 LBS | SYSTOLIC BLOOD PRESSURE: 125 MMHG | HEART RATE: 90 BPM | BODY MASS INDEX: 28.08 KG/M2 | OXYGEN SATURATION: 93 %

## 2024-08-06 DIAGNOSIS — R06.02 SOB (SHORTNESS OF BREATH): ICD-10-CM

## 2024-08-06 DIAGNOSIS — I27.20 PULMONARY HYPERTENSION (H): ICD-10-CM

## 2024-08-06 DIAGNOSIS — E11.9 TYPE 2 DIABETES MELLITUS WITHOUT COMPLICATION, WITHOUT LONG-TERM CURRENT USE OF INSULIN (H): Primary | ICD-10-CM

## 2024-08-06 LAB
ALBUMIN SERPL BCG-MCNC: 4.1 G/DL (ref 3.5–5.2)
ALP SERPL-CCNC: 75 U/L (ref 40–150)
ALT SERPL W P-5'-P-CCNC: 9 U/L (ref 0–50)
ANION GAP SERPL CALCULATED.3IONS-SCNC: 14 MMOL/L (ref 7–15)
AST SERPL W P-5'-P-CCNC: 21 U/L (ref 0–45)
BILIRUB SERPL-MCNC: 0.4 MG/DL
BUN SERPL-MCNC: 10.7 MG/DL (ref 8–23)
CALCIUM SERPL-MCNC: 9.7 MG/DL (ref 8.8–10.4)
CHLORIDE SERPL-SCNC: 106 MMOL/L (ref 98–107)
CHOLEST SERPL-MCNC: 205 MG/DL
CREAT SERPL-MCNC: 0.51 MG/DL (ref 0.51–0.95)
CREAT UR-MCNC: 130 MG/DL
EGFRCR SERPLBLD CKD-EPI 2021: >90 ML/MIN/1.73M2
ERYTHROCYTE [DISTWIDTH] IN BLOOD BY AUTOMATED COUNT: 13 % (ref 10–15)
FASTING STATUS PATIENT QL REPORTED: ABNORMAL
FASTING STATUS PATIENT QL REPORTED: ABNORMAL
GLUCOSE SERPL-MCNC: 103 MG/DL (ref 70–99)
HCO3 SERPL-SCNC: 22 MMOL/L (ref 22–29)
HCT VFR BLD AUTO: 49.3 % (ref 35–47)
HDLC SERPL-MCNC: 63 MG/DL
HGB BLD-MCNC: 17.7 G/DL (ref 11.7–15.7)
LDLC SERPL CALC-MCNC: 122 MG/DL
MCH RBC QN AUTO: 33.3 PG (ref 26.5–33)
MCHC RBC AUTO-ENTMCNC: 35.9 G/DL (ref 31.5–36.5)
MCV RBC AUTO: 93 FL (ref 78–100)
MICROALBUMIN UR-MCNC: <12 MG/L
MICROALBUMIN/CREAT UR: NORMAL MG/G{CREAT}
NONHDLC SERPL-MCNC: 142 MG/DL
NT-PROBNP SERPL-MCNC: 194 PG/ML (ref 0–900)
PLATELET # BLD AUTO: 213 10E3/UL (ref 150–450)
POTASSIUM SERPL-SCNC: 3.9 MMOL/L (ref 3.4–5.3)
PROT SERPL-MCNC: 7.2 G/DL (ref 6.4–8.3)
RBC # BLD AUTO: 5.32 10E6/UL (ref 3.8–5.2)
SODIUM SERPL-SCNC: 142 MMOL/L (ref 135–145)
TRIGL SERPL-MCNC: 98 MG/DL
WBC # BLD AUTO: 6.9 10E3/UL (ref 4–11)

## 2024-08-06 PROCEDURE — 82570 ASSAY OF URINE CREATININE: CPT | Performed by: INTERNAL MEDICINE

## 2024-08-06 PROCEDURE — 85027 COMPLETE CBC AUTOMATED: CPT | Performed by: PATHOLOGY

## 2024-08-06 PROCEDURE — 80053 COMPREHEN METABOLIC PANEL: CPT | Performed by: PATHOLOGY

## 2024-08-06 PROCEDURE — 83880 ASSAY OF NATRIURETIC PEPTIDE: CPT | Performed by: PATHOLOGY

## 2024-08-06 PROCEDURE — 99213 OFFICE O/P EST LOW 20 MIN: CPT | Performed by: INTERNAL MEDICINE

## 2024-08-06 PROCEDURE — 99000 SPECIMEN HANDLING OFFICE-LAB: CPT | Performed by: PATHOLOGY

## 2024-08-06 PROCEDURE — G0463 HOSPITAL OUTPT CLINIC VISIT: HCPCS | Performed by: INTERNAL MEDICINE

## 2024-08-06 PROCEDURE — 80061 LIPID PANEL: CPT | Mod: GZ

## 2024-08-06 PROCEDURE — 36415 COLL VENOUS BLD VENIPUNCTURE: CPT | Performed by: PATHOLOGY

## 2024-08-06 RX ORDER — LIDOCAINE 40 MG/G
CREAM TOPICAL
Status: CANCELLED | OUTPATIENT
Start: 2024-08-06

## 2024-08-06 ASSESSMENT — PAIN SCALES - GENERAL: PAINLEVEL: NO PAIN (0)

## 2024-08-06 NOTE — PATIENT INSTRUCTIONS
You were seen today in the Pulmonary Hypertension Clinic at the [facility].     Cardiology Provider you saw during your visit:    Dr. Edwards    Medication Changes:  No changes today    Results:   Component      Latest Ref Rng 8/6/2024  12:20 PM   Sodium      135 - 145 mmol/L 142    Potassium      3.4 - 5.3 mmol/L 3.9    Carbon Dioxide (CO2)      22 - 29 mmol/L 22    Anion Gap      7 - 15 mmol/L 14    Urea Nitrogen      8.0 - 23.0 mg/dL 10.7    Creatinine      0.51 - 0.95 mg/dL 0.51    GFR Estimate      >60 mL/min/1.73m2 >90    Calcium      8.8 - 10.4 mg/dL 9.7    Chloride      98 - 107 mmol/L 106    Glucose      70 - 99 mg/dL 103 (H)    Alkaline Phosphatase      40 - 150 U/L 75    AST      0 - 45 U/L 21    ALT      0 - 50 U/L 9    Protein Total      6.4 - 8.3 g/dL 7.2    Albumin      3.5 - 5.2 g/dL 4.1    Bilirubin Total      <=1.2 mg/dL 0.4    Patient Fasting? Unknown    Patient Fasting? Unknown    Cholesterol      <200 mg/dL 205 (H)    Triglycerides      <150 mg/dL 98    HDL Cholesterol      >=50 mg/dL 63    LDL Cholesterol Calculated      <=100 mg/dL 122 (H)    Non HDL Cholesterol      <130 mg/dL 142 (H)    N-Terminal Pro Bnp      0 - 900 pg/mL 194       Legend:  (H) High    Recommendations:   Right Heart Catheterization with     Pre-procedure instructions - Right Heart Cath and/or Biopsy and/or Pulmonary Angiogram  Patient Education    Your arrival time is TBD.  Location is [address] Waiting Room  Please plan on being at the hospital all day.  At any time, emergencies and/or urgent cases may come up which could delay the start of your procedure.    Pre-procedure instructions - Right heart catheterization  No solid food for 8 hours prior  Nothing to drink 2 hours prior to arrival time  You can take your morning medications (except diabetic and blood thinners) with sips of water  We recommend you arrange for  a ride to drop you off and pick you up, in the instance, you are unable to drive home, however you should be able to function as you normally would after the procedure              Anticoagulation Medication Instructions   NA  Nurse to write N/A if not currently taking    You will need to follow up with one of our cardiology APPs 1-2 weeks after your procedure. If you need help scheduling or rescheduling your appointment, please call [phone]      Follow-up:   Follow-up with Dr Edwards after testing.    Please call us immediately if you have any syncope (fainting or passing out), chest pain, edema (swelling or weight gain), or decline in your functional status (general decline in how you are feeling).    If you have emergent concerns after hours or on the weekend, please call our on-call Cardiologist at [phone], option 4. For emergencies call [phone].     Thank you for allowing us to be a part of your care here at the [facility]    If you have questions or concerns please contact us at:    Miki Richardson RN (P: [phone])    Nurse Coordinator       Pulmonary Hypertension     [facility]         JON Olvera   (Prior Auths & Pt Assistance)   ()  Clinic   Clinic   Pulmonary Hypertension   Pulmonary Hypertension  [facility]  (P)[phone]    (P) [phone]  (F) [phone]

## 2024-08-06 NOTE — PROGRESS NOTES
The patient returns for follow of PAH.  The patient is doing well. Exercise tolerance is maintained.  There is no interim history of exertional pre-syncope or syncope. There is no history of signs or symptoms of right heart failure.   The patient is taking medications regularly.  The patient is following low sodium diet.     The purpose of the visit was to discuss the nature of right heart cath and level of sedation (will not perform under general as request by patient. )    Current Outpatient Medications   Medication Sig Dispense Refill    albuterol (VENTOLIN HFA) 108 (90 Base) MCG/ACT inhaler Inhale 2 puffs into the lungs every 6 hours as needed for shortness of breath, wheezing or cough 18 g 11    allopurinol (ZYLOPRIM) 300 MG tablet Take 1 tablet (300 mg) by mouth every morning      Cyanocobalamin (VITAMIN B-12) 5000 MCG SUBL Place 2-3 sprays under the tongue daily Unknown dose. 2 or 3 sprays/day      ethacrynic acid (EDECRIN) 25 MG tablet Half pill every day (Patient taking differently: Take 0.5 tablets by mouth every morning Half pill every day) 45 tablet 3    Fluticasone-Umeclidin-Vilanterol (TRELEGY ELLIPTA) 200-62.5-25 MCG/ACT oral inhaler Inhale 1 puff into the lungs every morning      gabapentin (NEURONTIN) 100 MG capsule Take 1 capsule (100 mg) by mouth every 6 hours as needed (anxiety) 120 capsule 1    gabapentin (NEURONTIN) 400 MG capsule Take 1 capsule (400 mg) by mouth at bedtime 30 capsule 1    guaiFENesin (MUCINEX) 600 MG 12 hr tablet Take 1,200 mg by mouth 2 times daily as needed for congestion      KLOR-CON M20 20 MEQ CR tablet Take 1 tablet (20 mEq) by mouth every morning      MAGNESIUM PO Take 1 capsule by mouth 2 times daily Strength unknown      medical cannabis (Patient's own supply) See Admin Instructions (The purpose of this order is to document that the patient reports taking medical cannabis.  This is not a prescription, and is not used to certify that the patient has a  qualifying medical condition.)  Flower      melatonin 3 MG tablet Take 1 tablet (3 mg) by mouth nightly as needed for sleep 30 tablet 1    memantine (NAMENDA) 5 MG tablet Take 1 tablet (5 mg) by mouth 2 times daily 180 tablet 3    naloxone (NARCAN) 4 MG/0.1ML nasal spray Spray 1 spray (4 mg) into one nostril alternating nostrils as needed for opioid reversal every 2-3 minutes until assistance arrives 0.2 mL 0    omeprazole (PRILOSEC) 20 MG DR capsule Take 1 capsule (20 mg) by mouth every morning 90 capsule 3    oxyCODONE (ROXICODONE) 5 MG tablet Take 1 tablet (5 mg) by mouth 5 times daily May dispense/start 5/8/24 225 tablet 0    rOPINIRole (REQUIP) 2 MG tablet Take 1 tablet (2 mg) by mouth 3 times daily One tab 3 pm, one bedtime, and one during night on waking 270 tablet 3    STATIN NOT PRESCRIBED (INTENTIONAL) Please choose reason not prescribed from choices below.      SYNTHROID 150 MCG tablet Take 1 tablet (150 mcg) by mouth every morning MON to SAT 1 tablet/day; SUN 0.5 tablet      vitamin C (ASCORBIC ACID) 1000 MG TABS Take 1,000 mg by mouth daily      vitamin D3 (CHOLECALCIFEROL) 250 mcg (65358 units) capsule Take 1 capsule by mouth once a week       Current Facility-Administered Medications   Medication Dose Route Frequency Provider Last Rate Last Admin    cyanocobalamin injection 1,000 mcg  1,000 mcg Intramuscular Q30 Days Laith Constantino MD   1,000 mcg at 10/19/23 1048       Latest Reference Range & Units 08/06/24 12:20   Chloride 98 - 107 mmol/L 106   Carbon Dioxide (CO2) 22 - 29 mmol/L 22   Urea Nitrogen 8.0 - 23.0 mg/dL 10.7   Creatinine 0.51 - 0.95 mg/dL 0.51   GFR Estimate >60 mL/min/1.73m2 >90   Calcium 8.8 - 10.4 mg/dL 9.7   Anion Gap 7 - 15 mmol/L 14   Albumin 3.5 - 5.2 g/dL 4.1   Protein Total 6.4 - 8.3 g/dL 7.2   Alkaline Phosphatase 40 - 150 U/L 75   ALT 0 - 50 U/L 9   AST 0 - 45 U/L 21   Bilirubin Total <=1.2 mg/dL 0.4   Cholesterol <200 mg/dL 205 (H)   Patient Fasting?  Unknown  Unknown    Glucose 70 - 99 mg/dL 103 (H)   HDL Cholesterol >=50 mg/dL 63   LDL Cholesterol Calculated <=100 mg/dL 122 (H)   Non HDL Cholesterol <130 mg/dL 142 (H)   N-Terminal Pro Bnp 0 - 900 pg/mL 194   Triglycerides <150 mg/dL 98   WBC 4.0 - 11.0 10e3/uL 6.9   Hemoglobin 11.7 - 15.7 g/dL 17.7 (H)   Hematocrit 35.0 - 47.0 % 49.3 (H)   Platelet Count 150 - 450 10e3/uL 213   RBC Count 3.80 - 5.20 10e6/uL 5.32 (H)   MCV 78 - 100 fL 93   MCH 26.5 - 33.0 pg 33.3 (H)   MCHC 31.5 - 36.5 g/dL 35.9   (H): Data is abnormally high     Wt Readings from Last 24 Encounters:   08/06/24 78.9 kg (174 lb)   08/01/24 77.1 kg (170 lb)   07/02/24 88 kg (194 lb)   06/20/24 88.4 kg (194 lb 14.4 oz)   03/20/24 93 kg (205 lb)   02/07/24 93 kg (205 lb)   01/30/24 93.4 kg (206 lb)   01/23/24 94.7 kg (208 lb 11.2 oz)   01/03/24 98 kg (216 lb)   12/29/23 97.1 kg (214 lb)   12/19/23 96.6 kg (213 lb)   12/11/23 97.1 kg (214 lb)   10/19/23 101.1 kg (222 lb 12.8 oz)   10/18/23 100.2 kg (221 lb)   09/28/23 104.8 kg (231 lb)   09/25/23 103.9 kg (229 lb)   09/25/23 104 kg (229 lb 4.8 oz)   08/31/23 103.9 kg (229 lb)   08/21/23 103.4 kg (228 lb)   08/15/23 103.3 kg (227 lb 11.2 oz)   07/25/23 104.3 kg (230 lb)   07/14/23 103.9 kg (229 lb)   07/05/23 104 kg (229 lb 3.2 oz)   05/30/23 106.1 kg (234 lb)

## 2024-08-06 NOTE — LETTER
8/6/2024      RE: Randi Cleary  [address] [facility] [address]       Dear Colleague,    Thank you for the opportunity to participate in the care of your patient, Randi Cleary, at the [facility] [place] [facility]. Please see a copy of my visit note below.              Plan:  Echocardiogram and non contrast CT chest September        The patient returns for follow of PAH.  The patient is doing well. Exercise tolerance is maintained.  There is no interim history of exertional pre-syncope or syncope. There is no history of signs or symptoms of right heart failure.   The patient is taking medications regularly.  The patient is following low sodium diet.     Constitutional: alert, oriented, normal gait and station, normal mentation.  Oral: moist mucous membrans  Lymph: without pathologic adenopathy  Chest: clear to ausculation and percussion  Cor: No evidence of left or right ventricular activity.  Rhythm is regular.  S1 normal, S2 split physiologically. Murmurs are not present  Abdomen: without tenderness, rebound, guarding, masses, ascites  Extremities: Edema not present  Neuro: no focal defects, normal mentation  Skin: without open lesions  Psych: oriented, verbal, mental status in tact     Current Outpatient Medications   Medication Sig Dispense Refill     albuterol (VENTOLIN HFA) 108 (90 Base) MCG/ACT inhaler Inhale 2 puffs into the lungs every 6 hours as needed for shortness of breath, wheezing or cough 18 g 11     allopurinol (ZYLOPRIM) 300 MG tablet Take 1 tablet (300 mg) by mouth every morning       Cyanocobalamin (VITAMIN B-12) 5000 MCG SUBL Place 2-3 sprays under the tongue daily Unknown dose. 2 or 3 sprays/day       ethacrynic acid (EDECRIN) 25 MG tablet Half pill every day (Patient taking differently: Take 0.5 tablets by mouth every morning Half pill every day) 45 tablet 3     Fluticasone-Umeclidin-Vilanterol  (TRELEGY ELLIPTA) 200-62.5-25 MCG/ACT oral inhaler Inhale 1 puff into the lungs every morning       gabapentin (NEURONTIN) 100 MG capsule Take 1 capsule (100 mg) by mouth every 6 hours as needed (anxiety) 120 capsule 1     gabapentin (NEURONTIN) 400 MG capsule Take 1 capsule (400 mg) by mouth at bedtime 30 capsule 1     guaiFENesin (MUCINEX) 600 MG 12 hr tablet Take 1,200 mg by mouth 2 times daily as needed for congestion       KLOR-CON M20 20 MEQ CR tablet Take 1 tablet (20 mEq) by mouth every morning       MAGNESIUM PO Take 1 capsule by mouth 2 times daily Strength unknown       medical cannabis (Patient's own supply) See Admin Instructions (The purpose of this order is to document that the patient reports taking medical cannabis.  This is not a prescription, and is not used to certify that the patient has a qualifying medical condition.)  Flower       melatonin 3 MG tablet Take 1 tablet (3 mg) by mouth nightly as needed for sleep 30 tablet 1     memantine (NAMENDA) 5 MG tablet Take 1 tablet (5 mg) by mouth 2 times daily 180 tablet 3     naloxone (NARCAN) 4 MG/0.1ML nasal spray Spray 1 spray (4 mg) into one nostril alternating nostrils as needed for opioid reversal every 2-3 minutes until assistance arrives 0.2 mL 0     omeprazole (PRILOSEC) 20 MG DR capsule Take 1 capsule (20 mg) by mouth every morning 90 capsule 3     oxyCODONE (ROXICODONE) 5 MG tablet Take 1 tablet (5 mg) by mouth 5 times daily May dispense/start 5/8/24 225 tablet 0     rOPINIRole (REQUIP) 2 MG tablet Take 1 tablet (2 mg) by mouth 3 times daily One tab 3 pm, one bedtime, and one during night on waking 270 tablet 3     STATIN NOT PRESCRIBED (INTENTIONAL) Please choose reason not prescribed from choices below.       SYNTHROID 150 MCG tablet Take 1 tablet (150 mcg) by mouth every morning MON to SAT 1 tablet/day; SUN 0.5 tablet       vitamin C (ASCORBIC ACID) 1000 MG TABS Take 1,000 mg by mouth daily       vitamin D3 (CHOLECALCIFEROL) 250 mcg  (54209 units) capsule Take 1 capsule by mouth once a week       Current Facility-Administered Medications   Medication Dose Route Frequency Provider Last Rate Last Admin     cyanocobalamin injection 1,000 mcg  1,000 mcg Intramuscular Q30 Days Laith Constantino MD   1,000 mcg at 10/19/23 1048        Wt Readings from Last 24 Encounters:   08/06/24 78.9 kg (174 lb)   08/01/24 77.1 kg (170 lb)   07/02/24 88 kg (194 lb)   06/20/24 88.4 kg (194 lb 14.4 oz)   03/20/24 93 kg (205 lb)   02/07/24 93 kg (205 lb)   01/30/24 93.4 kg (206 lb)   01/23/24 94.7 kg (208 lb 11.2 oz)   01/03/24 98 kg (216 lb)   12/29/23 97.1 kg (214 lb)   12/19/23 96.6 kg (213 lb)   12/11/23 97.1 kg (214 lb)   10/19/23 101.1 kg (222 lb 12.8 oz)   10/18/23 100.2 kg (221 lb)   09/28/23 104.8 kg (231 lb)   09/25/23 103.9 kg (229 lb)   09/25/23 104 kg (229 lb 4.8 oz)   08/31/23 103.9 kg (229 lb)   08/21/23 103.4 kg (228 lb)   08/15/23 103.3 kg (227 lb 11.2 oz)   07/25/23 104.3 kg (230 lb)   07/14/23 103.9 kg (229 lb)   07/05/23 104 kg (229 lb 3.2 oz)   05/30/23 106.1 kg (234 lb)          Please do not hesitate to contact me if you have any questions/concerns.     Sincerely,     Francisco J Edwards MD

## 2024-08-06 NOTE — NURSING NOTE
Chief Complaint   Patient presents with    Follow Up      No therapy, wants to discuss plan for RHC, why and if she does it can she be sedated with labs prior       Vitals were taken, medications reconciled.    Paige Thurner, Facilitator   12:30 PM

## 2024-08-07 ENCOUNTER — TELEPHONE (OUTPATIENT)
Dept: INTERNAL MEDICINE | Facility: CLINIC | Age: 69
End: 2024-08-07

## 2024-08-07 ENCOUNTER — VIRTUAL VISIT (OUTPATIENT)
Dept: BEHAVIORAL HEALTH | Facility: CLINIC | Age: 69
End: 2024-08-07
Payer: MEDICARE

## 2024-08-07 DIAGNOSIS — F33.2 SEVERE RECURRENT MAJOR DEPRESSION WITHOUT PSYCHOTIC FEATURES (H): Primary | ICD-10-CM

## 2024-08-07 DIAGNOSIS — F41.1 GENERALIZED ANXIETY DISORDER: ICD-10-CM

## 2024-08-07 DIAGNOSIS — J44.9 CHRONIC OBSTRUCTIVE PULMONARY DISEASE, UNSPECIFIED COPD TYPE (H): ICD-10-CM

## 2024-08-07 RX ORDER — FLUTICASONE FUROATE, UMECLIDINIUM BROMIDE AND VILANTEROL TRIFENATATE 200; 62.5; 25 UG/1; UG/1; UG/1
1 POWDER RESPIRATORY (INHALATION) DAILY
Qty: 60 EACH | Refills: 0 | Status: SHIPPED | OUTPATIENT
Start: 2024-08-07 | End: 2024-08-21

## 2024-08-07 ASSESSMENT — COLUMBIA-SUICIDE SEVERITY RATING SCALE - C-SSRS
SUICIDE, SINCE LAST CONTACT: NO
2. HAVE YOU ACTUALLY HAD ANY THOUGHTS OF KILLING YOURSELF?: NO
TOTAL  NUMBER OF INTERRUPTED ATTEMPTS SINCE LAST CONTACT: NO
1. SINCE LAST CONTACT, HAVE YOU WISHED YOU WERE DEAD OR WISHED YOU COULD GO TO SLEEP AND NOT WAKE UP?: YES
TOTAL  NUMBER OF ABORTED OR SELF INTERRUPTED ATTEMPTS SINCE LAST CONTACT: NO
ATTEMPT SINCE LAST CONTACT: NO
6. HAVE YOU EVER DONE ANYTHING, STARTED TO DO ANYTHING, OR PREPARED TO DO ANYTHING TO END YOUR LIFE?: NO

## 2024-08-07 NOTE — TELEPHONE ENCOUNTER
Disp Refills Start End TAY   Fluticasone-Umeclidin-Vilanterol (TRELEGY ELLIPTA) 200-62.5-25 MCG/ACT oral inhaler (Discontinued) 1 each 11 11/16/2023 7/25/2024 No   Sig - Route: Inhale 1 puff into the lungs daily - Inhalation   Patient taking differently: Inhale 1 puff into the lungs every morning   Sent to pharmacy as: Fluticasone-Umeclidin-Vilant 200-62.5-25 MCG/ACT Aerosol Powder Breath Activated   Class: E-Prescribe   Reason for Discontinue: Reorder (No AVS)   Order: [id number]   E-Prescribing Status: Receipt confirmed by pharmacy (11/16/2023 12:28 PM CST)   E-Cancel Status: Request approved by pharmacy (7/25/2024 12:21 PM CDT)       E-Cancel Status Note: Prescription cancelled; filled 6 times

## 2024-08-07 NOTE — TELEPHONE ENCOUNTER
"[facility] Call [facility]    Phone Message    May a detailed message be left on voicemail: yes     Reason for Call: Medication Refill Request    Has the patient contacted the pharmacy for the refill? Yes   Name of medication being requested: Trelegy  Provider who prescribed the medication: Vira  Pharmacy: [facility]  Date medication is needed: ASAP    Pt is EXTREMELY upset because she was told by her pharmacy that her Rx from 11/16/23 was cancelled by Dr. Constantino with a note \"prescription cancelled; filled 6 times\", even though there was a total of 11 refills to start with. She does not understand why this was done, and is very upset about not being able to get this right now (is worried her breathing will worsen and she will need to go on prednisone and that will cause a host of other issues).      Action Taken: Other: [facility] IM    Travel Screening: Not Applicable   "

## 2024-08-07 NOTE — TELEPHONE ENCOUNTER
Rx pending in separate refill encounter from 8/7 which was routed to the covering provider for Dr Constantino.    Spoke with patient and relayed this information. She verbalized understanding and will reach out to the pharmacy for the refill.

## 2024-08-07 NOTE — PROGRESS NOTES
Transition Clinic Progress Note    Patient Name:   Randi Cleary Date: 2024          Service Type:     VISIT TIME START:       VISIT TIME END:       Session Length:  TC Session Length: 45 (38-52 Minutes)    Session #:  5    Attendees: TC Attendees: Client alone    Service Modality:  Service Modality: Video Visit:      Provider verified identity through the following two step process.  Patient provided:  Patient  and Patient address    Telemedicine Visit: The patient's condition can be safely assessed and treated via synchronous audio and visual telemedicine encounter.      Reason for Telemedicine Visit: Patient has requested telehealth visit    Originating Site (Patient Location): Patient's home    Distant Site (Provider Location): Provider Remote Setting- Home Office    Consent:  The patient/guardian has verbally consented to: the potential risks and benefits of telemedicine (video visit) versus in person care; bill my insurance or make self-payment for services provided; and responsibility for payment of non-covered services.     Patient would like the video invitation sent by:  My Chart    Mode of Communication:  Video Conference via [facility]    Distant Location (Provider):  Off-site    As the provider I attest to compliance with applicable laws and regulations related to telemedicine.    Informed Consent and Assessment Methods      Discussed with patient at Intake on 7/10/2024   This provider and patient discussed HIPAA, and the limits of confidentiality; including mandated reporting, the possibility of collaborative discussions with patient's primary care provider and the multidisciplinary team in the MH clinic during consultation.  Discussed the no show policy, and the benefits and possible unintended consequences of therapy.  Patient indicated understanding Transition Clinic services are short term, solution focused, until a referral can be made and patient can bridge to long term  therapy.  Patient verbally indicated understanding the informed consent.      DATA  Interactive Complexity: No  Crisis: No        Progress Since Last Session (Related to Symptoms / Goals / Homework):   Symptoms: No change continued depression with SI.  No plan or intent.  Haplessness about her future with medical issues.  Homework: Completed in session      Episode of Care Goals: Satisfactory progress - PREPARATION (Decided to change - considering how); Intervened by negotiating a change plan and determining options / strategies for behavior change, identifying triggers, exploring social supports, and working towards setting a date to begin behavior change     Current / Ongoing Stressors and Concerns:   Continued symptoms of depression.  She reports feeling overwhelmed with medical appts.  Continued stress with conflicts in her marital relationship.    SI with no plan or intent.  Safety plan is in her file created on 6/4/2024. She has a copy and can access the plan via [employer].     Treatment Objective(s) Addressed in This Session:   Decrease frequency and intensity of feeling down, depressed, hopeless  Decrease thoughts that you'd be better off dead or of suicide / self-harm      Cathartic expression of concerns     Intervention:   Solution Focused Brief Therapy: marital conflicts; goals for her marriage.     Brief Psychotherapy - discussed and prioritizing the most efficient treatment. Couples along with individual therapies.     Mindfulness Therapy - Cultivation of present-oriented, non-judgmental attitude. It helps nurtures great awareness, clarity, and acceptance of reality.    Assessments completed prior to visit:  The following assessments were completed by patient for this visit:  PHQ9:       5/20/2024     8:42 AM 6/6/2024     8:00 AM 6/27/2024    12:47 PM 7/2/2024     8:23 AM 7/10/2024     9:24 AM 7/17/2024     9:52 AM 7/28/2024    11:39 AM   PHQ-9 SCORE   PHQ-9 Total Score [place] 15 (Moderately severe  depression)  12 (Moderate depression) 13 (Moderate depression) 13 (Moderate depression) 14 (Moderate depression) 12 (Moderate depression)   PHQ-9 Total Score 15    15 16 12 13 13    13    13 14 12     GAD7:       4/4/2024     8:52 AM 4/18/2024    12:34 PM 5/15/2024    11:25 AM 6/6/2024     8:00 AM 6/27/2024    12:48 PM 7/1/2024    12:10 PM 7/17/2024     9:55 AM   CHAVO-7 SCORE   Total Score 13 (moderate anxiety) 17 (severe anxiety) 9 (mild anxiety)  11 (moderate anxiety)  10 (moderate anxiety)   Total Score 13    13    13    13 17    17 9    9    9    9 9 11    11    11    11    11 11 10     CAGE-AID:       6/22/2020     7:12 PM 1/18/2022    11:15 PM 6/6/2024     8:00 AM   CAGE-AID Total Score   Total Score 4 4 4   Total Score MyChart 4 (A total score of 2 or greater is considered clinically significant) 4 (A total score of 2 or greater is considered clinically significant)      PROMIS 10-Global Health (only subscores and total score):       1/22/2024    12:00 PM 5/1/2024    11:53 AM 5/15/2024    11:27 AM 6/6/2024     8:00 AM 7/1/2024    12:10 PM 7/10/2024     9:53 AM 7/17/2024    10:01 AM   PROMIS-10 Scores Only   Global Mental Health Score 5 7    7    7 7    7 8 8 9    9    9    9    9    9 6   Global Physical Health Score 9 9    9    9 8    8 8 11 11    11    11    11    11    11 9   PROMIS TOTAL - SUBSCORES 14 16    16    16 15    15 16 19 20    20    20    20    20    20 15     [place]iden Suicide Severity Rating Scale (Short Version)      12/21/2021    11:31 AM 1/9/2023     1:00 PM 5/21/2024     4:49 PM 5/21/2024     9:00 PM 6/6/2024     8:00 AM 7/1/2024    12:00 PM 8/7/2024     3:00 PM   Widen Suicide Severity Rating (Short Version)   Over the past 2 weeks have you felt down, depressed, or hopeless? yes         Over the past 2 weeks have you had thoughts of killing yourself? no         Have you ever attempted to kill yourself? no         Q1 Wished to be Dead (Past Month)  yes 1-->yes 1-->yes 1-->yes     Q2  Suicidal Thoughts (Past Month)  no 1-->yes 1-->yes 1-->yes     Q3 Suicidal Thought Method  no 0-->no 0-->no 1-->yes     Q4 Suicidal Intent without Specific Plan  no 1-->yes 0-->no 0-->no     Q5 Suicide Intent with Specific Plan  no 0-->no 0-->no 1-->yes     Q6 Suicide Behavior (Lifetime)  yes 1-->yes 0-->no 1-->yes     If yes to Q6, within past 3 months?  no 1-->yes 0-->no 0-->no     Level of Risk per Screen  moderate risk high risk moderate risk high risk     1. Wish to be Dead (Since Last Contact)      Y Y   Wish to be Dead Description (Since Last Contact)       tired of living for medical appts   2. Non-Specific Active Suicidal Thoughts (Since Last Contact)      Y N   Actual Attempt (Since Last Contact)       N   Has subject engaged in non-suicidal self-injurious behavior? (Since Last Contact)       N   Interrupted Attempts (Since Last Contact)       N   Aborted or Self-Interrupted Attempt (Since Last Contact)       N   Preparatory Acts or Behavior (Since Last Contact)       N   Suicide (Since Last Contact)       N   Calculated C-SSRS Risk Score (Since Last Contact)      Low Risk Low Risk         ASSESSMENT: Current Emotional / Mental Status (status of significant symptoms):   Risk status (Self / Other harm or suicidal ideation)   Patient denies current fears or concerns for personal safety.   Patient reports the following current or recent suicidal ideation or behaviors: no plan or intent. Safety plan has been reviewed.   Patient denies current or recent homicidal ideation or behaviors.   Patient denies current or recent self injurious behavior or ideation.   Patient denies other safety concerns.   Patient reports there has been no change in risk factors since their last session.     Patient reports there has been no change in protective factors since their last session.     A safety and risk management plan has been developed including: Patient consented to co-developed safety plan on 6/4/2024.  Safety and risk  management plan was reviewed.   Patient agreed to use safety plan should any safety concerns arise.  A copy was made available to the patient.     Appearance:   Looks very tired    Eye Contact:   Good    Psychomotor Behavior: Normal    Attitude:   Cooperative    Orientation:   Person Place Time Situation   Speech    Rate / Production: Normal/ Responsive    Volume:  Normal    Mood:    Normal   Affect:    Overwhelmed with sadness    Thought Content:  Clear    Thought Form:  Coherent  Goal Directed    Insight:    Good      Medication Review:   No changes to current psychiatric medication(s)     Medication Compliance:   Yes     Changes in Health Issues:   None reported     Chemical Use Review:   Substance Use: Problem use continues with no change since last session, addressed by care team        Tobacco Use: No current tobacco use.      Diagnoses:   296.33 (F33.2) Major Depressive Disorder, Recurrent Episode, Severe _  300.02 (F41.1) Generalized Anxiety Disorder    Collateral Reports Completed:   TC Collateral: [facility] electronic medical records reviewed. and No Collateral obtained.    PLAN: (Patient Tasks / Therapist Tasks / Other)  Patient will return on 8/15 for last session with this writer.  Therapist will be out of clinic between 8/15 and 9/6 when patient will see Tessie [place].  Therapist will arrange a TC appt with another therapist the week of 8/26/2024.    Is this the patient's last discharge?: No    Procedure Code: Psychotherapy (with patient) - 45 [CPT 61337]    ASHKAN Duckworth                                                         ______________________________________________________________________    Treatment Plan     Patient's Name: Randi Cleary             YOB: 1955  Date of Creation: 7/22/2024  Date Treatment Plan Last Reviewed/Revised: new     DSM5 Diagnoses: 296.33 (F33.2) Major Depressive Disorder, Recurrent Episode, Severe _ or 300.02 (F41.1)  Generalized Anxiety Disorder  Psychosocial / Contextual Factors: history of leola issues that impact quality of life.     PROMIS (reviewed every 90 days): 20     Referral / Collaboration:  Follow-up with medical team .     Anticipated number of session for this episode of care: 6+ sessions as needed until bridged to [facility]  Anticipation frequency of session: Weekly to biweekly   Anticipated Duration of each session: 38-52 minutes  Treatment plan will be reviewed in 90 days or when goals have been changed.         MeasurableTreatment Goal(s) related to diagnosis / functional impairment(s)     Goal 1: Patient will increase awareness of depression symptoms and their impact on functioning and develop skills to reduce negative impact      I will know I've met my goal when I am able to better manage my mood.          Objective #A (Patient Action)     Patient will describe thoughts, feelings, and actions associated with depression.                 Status: New - Date:  7/22/2024       Intervention(s)     Therapist will explore and process  patient triggers for depression and how depression has   impacted them.        Objective #B     Patient will increase depression coping skills and reduce suicidal ideations.   Status: New - Date:  7/22/2024         Intervention(s)     Therapist will teach mindfulness skills and model in session      I will know I've met my goal when I can manage and cope with depression and I no   longer experience suicidal ideations.      Objective #C  Bridge patient to [facility] for long term Therapy  Status: New - Date: 7/22/2024     Intervention(s)?    Facilitate Bridging ot [facility] Erikarbaydon [place] on 9/6/2024  Review safety plan in session/patient to follow her safety plan     Goal?2:?Patient will?experience a decrease in anxiety symptoms, as measured by a?2 point?reduction in her CHAVO-7 score.?  11  I will?know I've met my goal when I feel less stress and less worry.      Objective #A?(Patient Action)??  Patient  will?identify?3?fears / thoughts that contribute to feeling anxious.?     Status: New - Date: 7/22/2024     Intervention(s)?     Therapist will?provide cognitive behavioral therapeutic approach in processing patient's thoughts, feelings and beliefs and toward assisting patient in developing greater awareness of unhelpful thoughts that associate with increased?anxiety.??        Objective #B  Bridge patient to [facility] for long term Therapy   Status: New - Date: 7/22/2024     Intervention(s)?    Facilitate Bridging to [facility] Erika [place] on 9/6/2024     Therapist and patient discussed the goals for therapy and the interventions in therapy session.     Archana Blanton, [facility]                   July 24, 2024     __________________________________________________________________________________________  [place] Safety Plan     Creation Date: 6/4/24        Step 1: Warning signs:  Warning Signs   Start going down rabbit hole, thinking about the past, negative and positive memories   Missing my father   Anger / rage   Not taking care of myself      Step 2: Internal coping strategies - Things I can do to take my mind off my problems without contacting another person:  Strategies   Spirituality   Listening to music   Cat - [place]   Listening to [place] play music   Gardening   Swimming      Step 3: People and social settings that provide distraction:  Name Contact Information   Vania Patiño        [facility] pool   Going for walks outside      Step 4: People whom I can ask for help during a crisis:  Name Contact Information   Vania Little        Step 5: Professionals or agencies I can contact during a crisis:  Clinician/Agency Name Phone Emergency Contact   [facility] Crisis Line       [place] Warm Line          Suicide Prevention Lifeline Phone: Call or Text 888  Crisis Text Line: Text HOME to 645342     Step 6: Making the environment safer (plan for lethal means safety):  Maybe lelo Little  help with / hold onto medications.     Optional: What is most important to me and worth living for?:  A second chance. Writer a book. Painting. Gardening. [place]. Getting a therapy dog.     Reggie Safety Plan. Randi Poole and Robby Barba. Used with permission of the authors.

## 2024-08-08 ENCOUNTER — TELEPHONE (OUTPATIENT)
Dept: BEHAVIORAL HEALTH | Facility: CLINIC | Age: 69
End: 2024-08-08
Payer: MEDICARE

## 2024-08-08 ENCOUNTER — PATIENT OUTREACH (OUTPATIENT)
Dept: ONCOLOGY | Facility: CLINIC | Age: 69
End: 2024-08-08
Payer: MEDICARE

## 2024-08-08 NOTE — TELEPHONE ENCOUNTER
Spoke with patient and scheduled two appointments with Alee for 8/21 and 8/28 to fulfill therapy bridging until meeting with long-term provider 9/6.    Karen Prakash  Transition Clinic Coordinator  08/08/24 3:44 PM    --------    Writer spoke with patient who requested a call back today at 330 pm to schedule appointment with provider Yue as provider is on leave.    Jacqueline Givens  08/08/2024  151

## 2024-08-08 NOTE — TELEPHONE ENCOUNTER
Writer left pt a vm as requested by provider to schedule apps with Yue until pt next level of care as TC provider on leave.    Jacqueline Givens  08/08/2024  1129

## 2024-08-09 ENCOUNTER — TELEPHONE (OUTPATIENT)
Dept: BEHAVIORAL HEALTH | Facility: CLINIC | Age: 69
End: 2024-08-09
Payer: MEDICARE

## 2024-08-09 NOTE — TELEPHONE ENCOUNTER
Writer spoke with patient on que and rescheduled TC therapy appointment for 08/21/2024 @ 2:30 pm.    Jacqueline Givens  08/09/2024  1040

## 2024-08-12 ENCOUNTER — MYC MEDICAL ADVICE (OUTPATIENT)
Dept: PSYCHIATRY | Facility: CLINIC | Age: 69
End: 2024-08-12
Payer: MEDICARE

## 2024-08-12 NOTE — TELEPHONE ENCOUNTER
.Jeannette Mendoza APRN CNP Stephenson, Heather L RN  Phone Number: [phone]     Zack Benitez-    Will you let Winnie know that there is no absolute contraindication to use of Viibryd + medical marijuana? I always encourage reducing cannabis use as it can impact cognitive function, mood symptoms, etc.    I would encourage her to try the Viibryd. Will you remind her to take it with food for optimal absorption (and to reduce the risk of GI SE)?    Thanks!  Jeannette      Follow up: Relayed above information to patient. She said she will start the Viibryd. Writer will check back in a couple days to see if she started.

## 2024-08-12 NOTE — TELEPHONE ENCOUNTER
Called and spoke with patient. She state sthat she has not started the vilazodone yet because she has been smoking medical marijuana and read on the instructions that she should not take this medication while using marijuana. She has been trying to stop but has been unable to. She has too many stressors right now. She is stressed out with the amount of appointments she has as well as all the bills she is getting. Offered top have social work reach out to see if the can help her with financial resources. She has declined for now.   She is wondering if she should still start the medication with the medical marijuana.

## 2024-08-13 ENCOUNTER — TRANSFERRED RECORDS (OUTPATIENT)
Dept: MULTI SPECIALTY CLINIC | Facility: CLINIC | Age: 69
End: 2024-08-13
Payer: MEDICARE

## 2024-08-13 LAB — RETINOPATHY: NORMAL

## 2024-08-15 ENCOUNTER — APPOINTMENT (OUTPATIENT)
Dept: BEHAVIORAL HEALTH | Facility: CLINIC | Age: 69
End: 2024-08-15
Payer: MEDICARE

## 2024-08-15 ENCOUNTER — PATIENT OUTREACH (OUTPATIENT)
Dept: CARE COORDINATION | Facility: CLINIC | Age: 69
End: 2024-08-15
Payer: MEDICARE

## 2024-08-15 NOTE — PROGRESS NOTES
Clinic Care Coordination Contact  Care Team Conversations    [facility] contacted patient to follow up on billing concerns. Patient reports that she is on a virtual visit. She will call me back shortly.    Sola Abraham [facility]  Clinic Care Coordination  [facility]  [email or web address]  [phone]

## 2024-08-16 ENCOUNTER — HOSPITAL ENCOUNTER (OUTPATIENT)
Dept: BEHAVIORAL HEALTH | Facility: CLINIC | Age: 69
Discharge: HOME | End: 2024-08-16
Attending: PSYCHIATRY & NEUROLOGY
Payer: MEDICARE

## 2024-08-16 ENCOUNTER — ANESTHESIA EVENT (OUTPATIENT)
Dept: BEHAVIORAL HEALTH | Facility: CLINIC | Age: 69
End: 2024-08-16
Payer: MEDICARE

## 2024-08-16 ENCOUNTER — ANESTHESIA (OUTPATIENT)
Dept: BEHAVIORAL HEALTH | Facility: CLINIC | Age: 69
End: 2024-08-16
Payer: MEDICARE

## 2024-08-16 VITALS
HEART RATE: 72 BPM | OXYGEN SATURATION: 93 % | TEMPERATURE: 97.7 F | SYSTOLIC BLOOD PRESSURE: 142 MMHG | RESPIRATION RATE: 21 BRPM | DIASTOLIC BLOOD PRESSURE: 87 MMHG

## 2024-08-16 DIAGNOSIS — F33.2 SEVERE RECURRENT MAJOR DEPRESSION WITHOUT PSYCHOTIC FEATURES (H): Primary | ICD-10-CM

## 2024-08-16 PROCEDURE — 370N000017 HC ANESTHESIA TECHNICAL FEE, PER MIN: Performed by: ANESTHESIOLOGY

## 2024-08-16 PROCEDURE — 90870 ELECTROCONVULSIVE THERAPY: CPT | Performed by: PSYCHIATRY & NEUROLOGY

## 2024-08-16 PROCEDURE — 250N000011 HC RX IP 250 OP 636: Performed by: PSYCHIATRY & NEUROLOGY

## 2024-08-16 PROCEDURE — 250N000011 HC RX IP 250 OP 636: Performed by: STUDENT IN AN ORGANIZED HEALTH CARE EDUCATION/TRAINING PROGRAM

## 2024-08-16 PROCEDURE — 250N000009 HC RX 250: Performed by: STUDENT IN AN ORGANIZED HEALTH CARE EDUCATION/TRAINING PROGRAM

## 2024-08-16 PROCEDURE — 90870 ELECTROCONVULSIVE THERAPY: CPT | Performed by: ANESTHESIOLOGY

## 2024-08-16 PROCEDURE — 99100 ANES PT EXTEME AGE<1 YR&>70: CPT | Performed by: ANESTHESIOLOGY

## 2024-08-16 PROCEDURE — 90870 ELECTROCONVULSIVE THERAPY: CPT

## 2024-08-16 RX ORDER — KETOROLAC TROMETHAMINE 30 MG/ML
30 INJECTION, SOLUTION INTRAMUSCULAR; INTRAVENOUS ONCE
Status: CANCELLED
Start: 2024-08-16 | End: 2024-08-16

## 2024-08-16 RX ORDER — LABETALOL HYDROCHLORIDE 5 MG/ML
INJECTION, SOLUTION INTRAVENOUS PRN
Status: DISCONTINUED | OUTPATIENT
Start: 2024-08-16 | End: 2024-08-16

## 2024-08-16 RX ORDER — DEXAMETHASONE SODIUM PHOSPHATE 4 MG/ML
4 INJECTION, SOLUTION INTRA-ARTICULAR; INTRALESIONAL; INTRAMUSCULAR; INTRAVENOUS; SOFT TISSUE
Status: DISCONTINUED | OUTPATIENT
Start: 2024-08-16 | End: 2024-08-17 | Stop reason: HOSPADM

## 2024-08-16 RX ORDER — FENTANYL CITRATE 50 UG/ML
25 INJECTION, SOLUTION INTRAMUSCULAR; INTRAVENOUS EVERY 5 MIN PRN
Status: DISCONTINUED | OUTPATIENT
Start: 2024-08-16 | End: 2024-08-17 | Stop reason: HOSPADM

## 2024-08-16 RX ORDER — METHOHEXITAL IN WATER/PF 100MG/10ML
SYRINGE (ML) INTRAVENOUS PRN
Status: DISCONTINUED | OUTPATIENT
Start: 2024-08-16 | End: 2024-08-16

## 2024-08-16 RX ORDER — HYDROMORPHONE HCL IN WATER/PF 6 MG/30 ML
0.4 PATIENT CONTROLLED ANALGESIA SYRINGE INTRAVENOUS EVERY 5 MIN PRN
Status: DISCONTINUED | OUTPATIENT
Start: 2024-08-16 | End: 2024-08-17 | Stop reason: HOSPADM

## 2024-08-16 RX ORDER — KETOROLAC TROMETHAMINE 30 MG/ML
30 INJECTION, SOLUTION INTRAMUSCULAR; INTRAVENOUS ONCE
Status: COMPLETED | OUTPATIENT
Start: 2024-08-16 | End: 2024-08-16

## 2024-08-16 RX ORDER — ONDANSETRON 4 MG/1
4 TABLET, ORALLY DISINTEGRATING ORAL EVERY 30 MIN PRN
Status: DISCONTINUED | OUTPATIENT
Start: 2024-08-16 | End: 2024-08-17 | Stop reason: HOSPADM

## 2024-08-16 RX ORDER — HYDROMORPHONE HCL IN WATER/PF 6 MG/30 ML
0.2 PATIENT CONTROLLED ANALGESIA SYRINGE INTRAVENOUS EVERY 5 MIN PRN
Status: DISCONTINUED | OUTPATIENT
Start: 2024-08-16 | End: 2024-08-17 | Stop reason: HOSPADM

## 2024-08-16 RX ORDER — FENTANYL CITRATE 50 UG/ML
50 INJECTION, SOLUTION INTRAMUSCULAR; INTRAVENOUS EVERY 5 MIN PRN
Status: DISCONTINUED | OUTPATIENT
Start: 2024-08-16 | End: 2024-08-17 | Stop reason: HOSPADM

## 2024-08-16 RX ORDER — NALOXONE HYDROCHLORIDE 0.4 MG/ML
0.1 INJECTION, SOLUTION INTRAMUSCULAR; INTRAVENOUS; SUBCUTANEOUS
Status: DISCONTINUED | OUTPATIENT
Start: 2024-08-16 | End: 2024-08-17 | Stop reason: HOSPADM

## 2024-08-16 RX ORDER — ONDANSETRON 2 MG/ML
4 INJECTION INTRAMUSCULAR; INTRAVENOUS EVERY 30 MIN PRN
Status: DISCONTINUED | OUTPATIENT
Start: 2024-08-16 | End: 2024-08-17 | Stop reason: HOSPADM

## 2024-08-16 RX ORDER — SODIUM CHLORIDE, SODIUM LACTATE, POTASSIUM CHLORIDE, CALCIUM CHLORIDE 600; 310; 30; 20 MG/100ML; MG/100ML; MG/100ML; MG/100ML
INJECTION, SOLUTION INTRAVENOUS CONTINUOUS
Status: DISCONTINUED | OUTPATIENT
Start: 2024-08-16 | End: 2024-08-17 | Stop reason: HOSPADM

## 2024-08-16 RX ADMIN — Medication 100 MG: at 08:50

## 2024-08-16 RX ADMIN — SUCCINYLCHOLINE CHLORIDE 90 MG: 20 INJECTION, SOLUTION INTRAMUSCULAR; INTRAVENOUS; PARENTERAL at 08:50

## 2024-08-16 RX ADMIN — KETOROLAC TROMETHAMINE 30 MG: 30 INJECTION, SOLUTION INTRAMUSCULAR at 08:31

## 2024-08-16 RX ADMIN — LABETALOL HYDROCHLORIDE 20 MG: 5 INJECTION, SOLUTION INTRAVENOUS at 08:50

## 2024-08-16 NOTE — ANESTHESIA POSTPROCEDURE EVALUATION
Patient: Randi Cleary    Procedure: * No procedures listed *  Electroconvulsive Therapy    Anesthesia Type:  General    Note:  Disposition: Inpatient   Postop Pain Control: Uneventful            Sign Out: Well controlled pain   PONV: No   Neuro/Psych: Uneventful            Sign Out: Acceptable/Baseline neuro status   Airway/Respiratory: Uneventful            Sign Out: Acceptable/Baseline resp. status   CV/Hemodynamics: Uneventful            Sign Out: Acceptable CV status; No obvious hypovolemia; No obvious fluid overload   Other NRE: NONE   DID A NON-ROUTINE EVENT OCCUR? No           Last vitals:  Vitals:    08/16/24 0813   BP: 127/67   Pulse: 84   Resp: 16   Temp: 36.4  C (97.5  F)   SpO2: 96%       Electronically Signed By: Dylan Lemon DO  August 16, 2024  9:01 AM

## 2024-08-16 NOTE — PROCEDURES
"Procedures  [facility], [place]   ECT Procedure Note   08/16/2024    Randi Cleary is a 68 year old female patient.  [id number]    Patient Status: outpatient    Is this the first in a series of 12 treatments?  No      Allergies   Allergen Reactions    Serotonin Reuptake Inhibitors (Ssris) Anxiety, Difficulty breathing, Headache, Palpitations and Shortness Of Breath    Buspirone      The patient states she had serotonin syndrome    Cephalexin      Other reaction(s): unknown rxn.    Desvenlafaxine      Serotonin syndrome    Diclofenac Sodium [Diclofenac]      Serotonin syndrome and restless legs syndrome    Gabapentin      Drove on the wrong side of the highway    Levofloxacin      \"CAN'T REMEMBER\"    Penicillins      \"SORES IN MOUTH\"    Riluzole Difficulty breathing and Swelling    Sulfa Antibiotics      \"PT DOES NOT KNOW WHAT THE REACTION WAS\"    Topiramate Other (See Comments)     Frequent urination       Weight:  0 lbs 0 oz / 0 kg          Indications for ECT:   Medications ineffective and Psychotherapies ineffective         Clinical Narrative:   HPI - The patient describes a lifelong history of depression dating back to elementary school, worsening after her father's death when she was 13yo and especially in the 1980s in the setting of worsening physical health (since falling and breaking her ankle), which led to the the initiation of fluoxetine, her first antidepressant.  Her history has been primarily characterized by low mood, with some ups and downs but no extended depression-free periods since then.  She has tried many different medications from different classes, but cannot recall any one being particularly helpful for her.  She has experienced past episodes of particularly irritable mood and concomitant decreased sleep need, although most of these have lasted 1-2 days and triggered by anger related to external stressors.  She has at least one episode of a more extended " "episode of elevated mood (and associated grandiosity, increased spending, flight of ideas, increased goal directed behaviors, pressured speech, and odd and embarrassing behavior), which occurred in the context of relapse on alcohol in 2021. She does not endorse any events before about age 48.     The patient's depression is characterized by near daily low mood, frequent anhedonia, with sleep difficulties (although c/b pain, KYAW, and RLS), fatigue, feelings of guilt/worthlessness, and impaired concentration.  She reports feelings of \"despair,\" at times with active SI with plan, but denies any intent to act on this - \"maybe it's hope.\"  She has 1 lifetime suicide attempt (overdose) in the 1980s, for which she was psychiatrically hospitalized.  She has a remote history of SIB (cutting) but not recently.       Psych pertinent item history includes includes suicide attempt , suicidal ideation, SIB , aggression, trauma hx, substance use: alcohol, cannabis, and Patient has a history of alcohol dependence treated 20+ years ago and she relapsed in 2020 for a couple of months, in her 20s she\" loved getting high\" on cocaine, LSD, mushrooms, speed, white cross, mutiple psychotropic trials , psych hosp, ketamine, and major medical problems.    Target Symptoms for Improvement: Amotivation, Fatigue, Improved self-image and Panic attacks         Diagnosis:   Major depression         Assessment:   #1 05/24/24 Some circumstantial thought, needs some redirection, 3.5 hours sleep last night, anxious, mood 1/10, PDW but no SI, never had ECT before - only TMS. No AVH.   #2 05/29/24  Mood 4/10, better over w/e, some word find difficulties, no AVH/SI/PDW. Chronic sciatica pain, neck and shoulder pain - didn't worsen with ECT. Mild headache.   #3 05/31/24  Mood 4/10, No SI, PDW, AVH, some wrist pain and headache, memory might be improving.    #4 6/3/24  Phq-9= 13, mood 3/10.  Yesterday was very tearful, still a bit today.  Last treatment " "had awareness under paralysis.  Otherwise, some initial confusion after first ECT but no subsequent cognitive effects.  Signed consent to continue.  #5 6/5/24 Mood 4-5/10  She feels like her \"rage\" is less and she feels lighter. but no cognitive side effects. She complains of excessive sweating and is concerned her thryoid is unbalanced. She is somatically focused She reports pain on the side of her neck radiating down to her chest. She had a migraine she thinks over the weekend which usual starts as occipital tension.  #6 6/7/24 mood 4-5/10. No SI. She does have some baseline long term memory difficulties no AVH.   #7 6/10/24  She still is worried that She is not able to go home. She had visitors this weekend who said \"you don't seem to have the weight of the world on your shoulders anymore\" she disagrees she had a downward spiral over the weekend when there was a mix up with her clothes and she usually feels tearful after ECT. She does not report anything feeling worse. She gets down on herself when she has an anxiety spiral and it was the 1st one she has had since admission.   #8 6/12/24 Winnie reported some tearfulness overnight. She is noticing a weight lifting mood 6/10 . Reports some difficulty with remembering names but believes this is at baseline.   #9 6/14/24 Mood 5/10 (10 best) She feels like she is improving each time . She still has occasional crying spells but she feels she bounces back quicker. She is still anxious about going home. No Si. Tolerating ECT  #10 06/17/24 mood 5/10 She does feellike she has gotten some improvement since starting Ect but still has times where she gets tearful and anxious \"goes down the rabit hole\" but not as often . She has had ketamine troches before with pain  management to no effect but wonders about ketamine infusions.  #11 06/19/24 Mood today is 5/10. Patient is wondering whether she could do a ketamine course (did have ketamine in the past), despite of being on " "opioids. Feels that ketamine can help with \"anger, tearing and anxiety.\" She complains to the anesthesiologist about recent urinary incontinence which needs to be considered when  using ketamine. She wants to try it for anger ,tearing and anxiety which is not a standard indication  #12 06/21/24 Mood 5/10, no PDW/SI, but some anxiety around pain this AM.  Patient is interested in maintenance ECT at her attending psychiatrist's recommendation to prevent the possibility of her mood dipping as low as it did previously that led to her hospitalization.  Discussed pros and cons of maintenance ECT, suggested alternative of maintenance medications/therapy and/or watchful waiting with possibility of retreatment should she relapse, as well as alternate interventions including ketamine.  At the moment, she is most interested in pursuing maintenance ECT - will plan for next Friday pending confirmation with her family that she has post-ECT ride and monitoring.  M1 06/28/24 Mood ups and downs, irritability, anxiety, sadness switching multiple times a day since being discharged home.  Had difficulty with the transition home, was harder than she thought it would be.  However, still able to \"push away\" PDW/SI, and did not have periods of persistently low mood this past week.  Has noticed some anterograde and retrograde amnesia of the 1-2 months prior to starting ECT.  Will continue to track.  Today, mood 6/10 \"now that I'm at ECT.\"  M2 07/05/24 She was tearful, states that she doesn't feel good, \"starting to drop\", states that the mood change happened in Wednesday somewhat suddenly, when she encountered several business stressors and felt overwhelmed. Mood 3-4/10. Denies SI and feels safe at home. Still reports memory issues. Reports that the day program has been overwhelming.    She cancelled her colonoscopy in order to focus on her right heart cath the next week. She feels like she has too many procedures scheduled and pushed off " her sleep study also.  M3 7/12/24 Doing well.  Somewhat stressed witht all the medical appointments she has to coordinate. Her colonoscopy got cancelled because of her upcoming appointment with cardiology. Canceled the outpatient program but interested in doing the group therapy at [facility].   M4 7/19/24 Doing well. Less frustrated and started therapy. Reports short term memory impairment. That said, she is hesitant to space ECT to i3vaclc  M5 8/2/24 Mood 5-6/10 she struggles some with the interval felling more irritable and tearful the second week. She felt some Si yesterday because she was frustrated and overehwlemd but no SI today. Cogntiively processing speed is affected and does better if she can get a hint. Encouraged her to take her viibryd as prescribed  M6 08/16/24  She is having headaches she attributes to the viibryd and encouraged her to adhere. She reports she still has lability between session lots of stressors with medical billing errors and feeling like she is hounded by collections mood 4/10. Tolerating Ect without complaints of cognitive side effects. Spent birthday in still water          Pause for the Cause:     Correct patient Yes   Correct procedure/laterality settings: Yes           Intra-Procedure Documentation:     ECT #: 18   Treatment number this series: M6   Total treatment number: 18     Type of ECT:  Right, unilateral ultrabrief    ECT Medications:    Toradol 30mg iv - for headache/myalgia     Brevital: 100 mg (incr from 90 mg as still awake)   Succinyl Choline: 90 mg    BP - nicardipine 1500 mcg     ECT Strip Summary: RUL Titration #3: 38.4 mC   Energy Level:  384.0mC, 0.3 ms, 100 Hz, 8 sec, 800 mA     Motor Seizure Duration: 15 seconds  EEG Seizure Duration: 28seconds      Time for re-orientation:     Complications: none    Plan:   - Plan for maintenance [id number]. Next 8/30/24  - Monitor depression severity with clinical assessment augmented with PHQ9 every other treatment  - Continue  current medications    Discharge instructions:    -- Start your Viibryd to support your recovery and prevent relapse   -- Remember to NOT take gabapentin after 6pm the night before each treatment  -- You will have to check to make sure somebody can drive you to and from the hospital and monitor you for 6 hours after each treatment  -- Maintenance ECT  every two weeks, then to once every month.  The goal of maintenance ECT is to space out and eventually stop  -- During maintenance, you can't drive for 24 hours after each treatment.    - We discussed other alternatives including trying ketamine through the [facility] or staying on your regular medications and therapy, but at the moment you were most interested in pursuing maintenance    - Per Dr Varela:   - Consider trial of serotonin modulator (e.g., vilazodone vs. vortioxetine) or novel agent Auvelity  - Consider augmentation with atypical antipsychotic (e.g., quetiapine or aripiprazole), though there are concerns given pre-diabetes        Toan Cotter MD  Department of Psychiatry and Behavioral Sciences

## 2024-08-16 NOTE — DISCHARGE INSTRUCTIONS
ECT Discharge Instructions      During your ECT series:    Do not drive or work heavy equipment until 7 days after your last treatment.  Do not drink alcohol or use street drugs (illicit drugs) while you are having treatments.  Do not make important decisions, including legal decisions.    After each treatment:    Get plenty of rest. A responsible adult must stay with your for at least 6 hours.  Avoid heavy or risky activities for 24 hours while in maintenance ECT.   Do not drive for at least 24 hours after your treatment while in maintenance ECT.   If you have more than one treatment within one week, do not drive for 7 days after your last treatment.   If you feel light-headed, sit for a few minutes before standing. Have someone help you get up.  If you have nausea (feel sick to your stomach): Drink only clear liquids such as apple juice, ginger ale, broth or 7UP, Be sure to drink plenty of liquids. Move to a normal diet as you feel able.   If you received Toradol, wait 6 hours before taking ibuprofen.  Call your doctor if:   You have a fever over 100F (37.7 C) (taken under the tongue), or a fever that last more than 24 hours.  Your IV site is red, swollen, very painful or is getting more tender.  You have nausea that gets very bad or does not improve.    If you have any symptoms after ECT, tell our staff before your next treatment.  The ECT Department can be reached at [phone].  The ECT Department is open Mondays, Wednesdays and Fridays from 7:00 AM to 2:00 PM.    To speak to a doctor, call:  Your primary care provider or [facility] for Dr. Beavers, Dr. Hutchison or Dr. Cotter PHONE: [phone]; fax: [phone]    New instructions:   Plan for maintenance [id number]. Next 8/30/24    - Continue current medications     Discharge instructions:               -- Start your Viibryd to support your recovery and prevent relapse              -- Remember to NOT take gabapentin after 6pm the night before each  treatment  -- You will have to check to make sure somebody can drive you to and from the hospital and monitor you for 6 hours after each treatment  -- Maintenance ECT  every two weeks, then to once every month.  The goal of maintenance ECT is to space out and eventually stop  -- During maintenance, you can't drive for 24 hours after each treatment.     - We discussed other alternatives including trying ketamine through the [facility] or staying on your regular medications and therapy, but at the moment you were most interested in pursuing maintenance    - Per Dr Varela:   - Consider trial of serotonin modulator (e.g., vilazodone vs. vortioxetine) or novel agent Auvelity  - Consider augmentation with atypical antipsychotic (e.g., quetiapine or aripiprazole), though there are concerns given pre-diabetes           You have received Toradol today at 0815. Toradol is an NSAID.  Do not take any NSAID's including: Ibuprofen, Naproxen Sodium, Asprin, Advil, Motrin, Aleve, Shannan, etc., until six hours after the above time which would be 215pm. If you have any questions check back with your Physician, or pharmacist.     Covid-19 Testing:  We are no longer requiring covid tests prior to your procedure. If you are ill, please call the ECT department to discuss plan for rescheduling your appointment. [phone]    Please come to the [facility] and check-in with Security before your ECT appointment.  The [facility] is located right next to the Adult Emergency Room.  The address is [address] [place] [address].    After your appointment, you may be picked up at the [facility] entrance, which is located at [address] [place] [address], about 1 block North of the [facility].    Transported by:   Sidney Palma    Reviewed AVS discharge instructions with:   Sidney

## 2024-08-16 NOTE — ANESTHESIA PREPROCEDURE EVALUATION
Anesthesia Pre-Procedure Evaluation    Patient: Randi Cleary   MRN: [id number] : 1955        Procedure : * No procedures listed *  Electroconvulsive Therapy       Past Medical History:   Diagnosis Date    Bipolar 2 disorder (H)     Breast cancer (H)     lumpectomy, radiation, chemo    Chronic pain syndrome     COPD (chronic obstructive pulmonary disease) (H)     asthma    Cord compression (H) 2021    Dizzy     Drug tolerance     opioid    Esophageal reflux     Fatigue     Generalized anxiety disorder     Graves disease     Hemochromatosis 2018    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; H63D not detected    History of breast cancer 2020    Formatting of this note might be different from the original. Created by Conversion  Replacement Utility updated for latest IMO load Formatting of this note might be different from the original. Created by Conversion  Replacement Utility updated for latest IMO load    History of corticosteroid therapy 2019    History of partial adrenalectomy (H24) 2019    History of pheochromocytoma 2019    Hx antineoplastic chemotherapy     Hx of radiation therapy     Hyperlipidaemia     Hypertension     Impaired fasting glucose     Injury of neck, whiplash 07/15/2021    Joint pain     KYAW (obstructive sleep apnea) 2016    Osteopenia     Pheochromocytoma, left 2017    laparoscopically removed    Postablative hypothyroidism     Prediabetes 10/03/2019    by A1c    Psoriasis     Psoriatic arthropathy (H)     Pulmonary hypertension (H)     Right rotator cuff tear     RLS (restless legs syndrome)     on ropinorole    Sacroiliitis (H24)     Serotonin syndrome 2020    [facility] - While on desvenlafaxine 100mg    Snoring     Spinal stenosis     Status post coronary angiogram 10/03/2019    Urinary incontinence     Vitamin B 12 deficiency 2009    Vitamin D deficiency 2010      Past Surgical History:   Procedure Laterality Date    ARTHRODESIS  ANKLE      ARTHROPLASTY ANKLE Right 6/29/2015    Procedure: ARTHROPLASTY ANKLE;  Surgeon: Jason Coughlin MD;  Location: [facility]    ARTHROPLASTY REVISION ANKLE Right 6/29/2015    Procedure: ARTHROPLASTY REVISION ANKLE;  Surgeon: Jason Coughlin MD;  Location: [facility]    BIOPSY BREAST      BREAST BIOPSY, CORE RT/LT      COLONOSCOPY      COLONOSCOPY N/A 2/25/2021    Procedure: COLONOSCOPY;  Surgeon: Guru Elke Tolbert MD;  Location: [facility]    CV CORONARY ANGIOGRAM N/A 10/3/2019    Procedure: CV CORONARY ANGIOGRAM;  Surgeon: Bryce Pierre MD;  Location:  HEART CARDIAC CATH LAB    CV RIGHT HEART CATH MEASUREMENTS RECORDED N/A 10/3/2019    Procedure: CV RIGHT HEART CATH;  Surgeon: Bryce Pierre MD;  Location:  HEART CARDIAC CATH LAB    ESOPHAGOSCOPY, GASTROSCOPY, DUODENOSCOPY (EGD), COMBINED N/A 2/25/2021    Procedure: ESOPHAGOGASTRODUODENOSCOPY, WITH BIOPSY;  Surgeon: Guru Elke Tolbert MD;  Location:  GI    EYE SURGERY  2021    HC REMOVE TONSILS/ADENOIDS,<11 Y/O      Description: Tonsillectomy With Adenoidectomy;  Recorded: 04/07/2010;    IR LUMBAR EPIDURAL STEROID INJECTION  10/26/2004    IR LUMBAR EPIDURAL STEROID INJECTION  11/16/2004    IR LUMBAR EPIDURAL STEROID INJECTION  12/21/2004    IR LUMBAR EPIDURAL STEROID INJECTION  6/8/2006    JOINT REPLACEMENT      LAMINOPLASTY CERVICAL POSTERIOR THREE+ LEVELS Left 12/21/2021    Procedure: CERVICAL 3-CERVICAL 6 LEFT OPEN DOOR LAMINOPLASTY AND LEFT CERVICAL 4-5 AND CERVICAL 6-7 POSTERIOR FORAMINOTOMY;  Surgeon: Angela Gregory MD;  Location: [facility] OR    LAPAROSCOPIC ADRENALECTOMY Left 08/02/2017    pheochromocytoma    LAPAROSCOPIC ADRENALECTOMY Left 8/2/2017    Procedure: LAPAROSCOPIC LEFT ADRENALECTOMY, ;  Surgeon: Gab Linares MD;  Location: [facility] OR;  Service:     LENGTHEN TENDON ACHILLES Right 6/29/2015    Procedure: LENGTHEN TENDON ACHILLES;  Surgeon: Jason Coughlin MD;  " Location: [facility]    LUMPECTOMY BREAST      LUMPECTOMY BREAST Left 1994    MAMMOPLASTY REDUCTION Right 2015    [place]    MAMMOPLASTY REDUCTION Right     approx late     MASTECTOMY      left lumpectomy with axillary node dissection    MASTECTOMY MODIFIED RADICAL      OTHER SURGICAL HISTORY Right     reconstructive breast surgery    OTHER SURGICAL HISTORY      Adrenalectomy for pheochromocytoma    [place] MASTECTOMY, MODIFIED RADICAL      Description: Modified Radical Mastectomy Left Breast;  Recorded: 2010;    REPAIR HAMMER TOE Right 2015    Procedure: REPAIR HAMMER TOE;  Surgeon: Jason Coughlin MD;  Location: [facility]    TONSILLECTOMY      TONSILLECTOMY & ADENOIDECTOMY      C ARTHRODESIS,ANKLE,OPEN Right     Description: Ankle Arthrodesis;  Recorded: 2010;      Allergies   Allergen Reactions    Serotonin Reuptake Inhibitors (Ssris) Anxiety, Difficulty breathing, Headache, Palpitations and Shortness Of Breath    Buspirone      The patient states she had serotonin syndrome    Cephalexin      Other reaction(s): unknown rxn.    Desvenlafaxine      Serotonin syndrome    Diclofenac Sodium [Diclofenac]      Serotonin syndrome and restless legs syndrome    Gabapentin      Drove on the wrong side of the highway    Levofloxacin      \"CAN'T REMEMBER\"    Penicillins      \"SORES IN MOUTH\"    Riluzole Difficulty breathing and Swelling    Sulfa Antibiotics      \"PT DOES NOT KNOW WHAT THE REACTION WAS\"    Topiramate Other (See Comments)     Frequent urination      Social History     Tobacco Use    Smoking status: Former     Current packs/day: 0.00     Average packs/day: 2.5 packs/day for 29.2 years (72.9 ttl pk-yrs)     Types: Cigarettes     Start date: 1971     Quit date: 2000     Years since quittin.1     Passive exposure: Past    Smokeless tobacco: Never   Substance Use Topics    Alcohol use: Not Currently     Comment: relapse 2021 sober       Wt Readings from Last 1 Encounters: "   08/06/24 78.9 kg (174 lb)        Anesthesia Evaluation            ROS/MED HX  ENT/Pulmonary:       Neurologic:       Cardiovascular:     (+)  - -   -  - -                                pulmonary hypertension,      METS/Exercise Tolerance:     Hematologic:       Musculoskeletal:       GI/Hepatic:       Renal/Genitourinary:       Endo:     (+)  type II DM,        thyroid problem,     Obesity,       Psychiatric/Substance Use:       Infectious Disease:       Malignancy:       Other:            Physical Exam    Airway        Mallampati: II   TM distance: > 3 FB   Neck ROM: full   Mouth opening: > 3 cm    Respiratory Devices and Support         Dental       (+) Multiple crowns, permanant bridges      Cardiovascular   cardiovascular exam normal          Pulmonary   pulmonary exam normal                OUTSIDE LABS:  CBC:   Lab Results   Component Value Date    WBC 6.9 08/06/2024    WBC 7.9 06/04/2024    HGB 17.7 (H) 08/06/2024    HGB 17.0 (H) 06/04/2024    HCT 49.3 (H) 08/06/2024    HCT 48.1 (H) 06/04/2024     08/06/2024     06/04/2024     BMP:   Lab Results   Component Value Date     08/06/2024     06/04/2024    POTASSIUM 3.9 08/06/2024    POTASSIUM 4.3 06/04/2024    CHLORIDE 106 08/06/2024    CHLORIDE 103 06/04/2024    CO2 22 08/06/2024    CO2 21 (L) 06/04/2024    BUN 10.7 08/06/2024    BUN 17.3 06/04/2024    CR 0.51 08/06/2024    CR 0.53 06/04/2024     (H) 08/06/2024    GLC 96 06/04/2024     COAGS:   Lab Results   Component Value Date    PTT 34 12/13/2021    INR 0.94 12/13/2021     POC:   Lab Results   Component Value Date     (H) 02/25/2021     HEPATIC:   Lab Results   Component Value Date    ALBUMIN 4.1 08/06/2024    PROTTOTAL 7.2 08/06/2024    ALT 9 08/06/2024    AST 21 08/06/2024    ALKPHOS 75 08/06/2024    BILITOTAL 0.4 08/06/2024     OTHER:   Lab Results   Component Value Date    A1C 5.6 05/22/2024    LINDA 9.7 08/06/2024    MAG 1.9 05/22/2024    TSH 1.69 06/04/2024    T4  1.49 03/29/2024    T3 114 01/18/2023    CRP <2.9 11/16/2021    SED 8 10/17/2023       Anesthesia Plan    ASA Status:  3       Anesthesia Type: General.   Induction: Intravenous.           Consents            Postoperative Care            Comments:               Dylan Lemon DO    I have reviewed the pertinent notes and labs in the chart from the past 30 days and (re)examined the patient.  Any updates or changes from those notes are reflected in this note.

## 2024-08-16 NOTE — PROGRESS NOTES
"Clinic Care Coordination Contact  Follow Up Progress Note      Assessment: CC received return call from patient. Patient expressed concerns regarding the coding of her specialty appointments. She reports that her  is still working as a  at age 72 to \"make ends meet\". She is frustrated about the bills that she continues to receive. She plans to express these concerns to her RNCC, Christopher, as well. She was encouraged to explore [facility]. This information was sent to her via [employer].    Care Gaps:    Health Maintenance Due   Topic Date Due    HF ACTION PLAN  Never done    DIABETIC FOOT EXAM  Never done    COPD ACTION PLAN  Never done    HEPATITIS A IMMUNIZATION (1 of 2 - Risk 2-dose series) Never done    ZOSTER IMMUNIZATION (1 of 2) Never done    MEDICARE ANNUAL WELLNESS VISIT  Never done    EYE EXAM  12/07/2021    MAMMO SCREENING  08/15/2023    COLORECTAL CANCER SCREENING  02/25/2024    COVID-19 Vaccine (8 - 2023-24 season) 04/29/2024    DEPRESSION 6 MO INDEX REPEAT PHQ-9  08/12/2024    A1C  08/22/2024     Intervention/Education provided during outreach: Patient verbalized understanding, engaged in AIDET communication during patient encounter.     Plan: Patient was encouraged to reach out with any questions or concerns.    Care Coordinator will do no further outreaches at this time.    Sola Abraham, [facility]  Clinic Care Coordination  [facility]  [email or web address]  [phone]  "

## 2024-08-16 NOTE — PROGRESS NOTES
Clinic Care Coordination Contact  [facility]/Voicemail    Clinical Data: Care Coordinator Outreach    Outreach Documentation Number of Outreach Attempt   8/16/2024   9:35 AM 1     Left message on patient's voicemail with call back information and requested return call.    Plan: Care Coordinator will try to reach patient again in 10 business days.    TANIKA Walls  Clinic Care Coordination  [facility]  [email or web address]  [phone]

## 2024-08-16 NOTE — ANESTHESIA CARE TRANSFER NOTE
Patient: Randi Cleary    Procedure: * No procedures listed *  Electroconvulsive Therapy    Diagnosis: * No pre-op diagnosis entered *  Diagnosis Additional Information: No value filed.    Anesthesia Type:   General     Note:    Oropharynx: oropharynx clear of all foreign objects and spontaneously breathing  Level of Consciousness: drowsy  Oxygen Supplementation: face mask  Level of Supplemental Oxygen (L/min / FiO2): 10  Independent Airway: airway patency satisfactory and stable  Dentition: dentition unchanged  Vital Signs Stable: post-procedure vital signs reviewed and stable  Report to RN Given: handoff report given  Patient transferred to: PACU    Handoff Report: Identifed the Patient, Identified the Reponsible Provider, Reviewed the pertinent medical history, Discussed the surgical course, Reviewed Intra-OP anesthesia mangement and issues during anesthesia, Set expectations for post-procedure period and Allowed opportunity for questions and acknowledgement of understanding    Vitals:  Vitals Value Taken Time   BP     Temp     Pulse     Resp     SpO2         Electronically Signed By: Massimo Cunningham MD  August 16, 2024  8:59 AM

## 2024-08-20 ENCOUNTER — TRANSFERRED RECORDS (OUTPATIENT)
Dept: HEALTH INFORMATION MANAGEMENT | Facility: CLINIC | Age: 69
End: 2024-08-20
Payer: MEDICARE

## 2024-08-21 ENCOUNTER — OFFICE VISIT (OUTPATIENT)
Dept: INTERNAL MEDICINE | Facility: CLINIC | Age: 69
End: 2024-08-21
Payer: MEDICARE

## 2024-08-21 ENCOUNTER — VIRTUAL VISIT (OUTPATIENT)
Dept: BEHAVIORAL HEALTH | Facility: CLINIC | Age: 69
End: 2024-08-21
Payer: MEDICARE

## 2024-08-21 VITALS
WEIGHT: 175 LBS | HEART RATE: 87 BPM | OXYGEN SATURATION: 97 % | RESPIRATION RATE: 16 BRPM | TEMPERATURE: 97.7 F | BODY MASS INDEX: 28.12 KG/M2 | DIASTOLIC BLOOD PRESSURE: 60 MMHG | SYSTOLIC BLOOD PRESSURE: 110 MMHG | HEIGHT: 66 IN

## 2024-08-21 DIAGNOSIS — Z12.31 ENCOUNTER FOR SCREENING MAMMOGRAM FOR BREAST CANCER: ICD-10-CM

## 2024-08-21 DIAGNOSIS — I27.20 PULMONARY HYPERTENSION (H): ICD-10-CM

## 2024-08-21 DIAGNOSIS — Z12.11 SCREEN FOR COLON CANCER: ICD-10-CM

## 2024-08-21 DIAGNOSIS — Z85.3 HISTORY OF LEFT BREAST CANCER: ICD-10-CM

## 2024-08-21 DIAGNOSIS — E11.9 TYPE 2 DIABETES MELLITUS WITHOUT COMPLICATION, WITHOUT LONG-TERM CURRENT USE OF INSULIN (H): Primary | ICD-10-CM

## 2024-08-21 DIAGNOSIS — J44.9 CHRONIC OBSTRUCTIVE PULMONARY DISEASE, UNSPECIFIED COPD TYPE (H): ICD-10-CM

## 2024-08-21 DIAGNOSIS — F41.1 GENERALIZED ANXIETY DISORDER: ICD-10-CM

## 2024-08-21 DIAGNOSIS — F33.2 MDD (MAJOR DEPRESSIVE DISORDER), RECURRENT EPISODE, SEVERE (H): Primary | ICD-10-CM

## 2024-08-21 LAB — HBA1C MFR BLD: 5.7 % (ref 0–5.6)

## 2024-08-21 PROCEDURE — 36415 COLL VENOUS BLD VENIPUNCTURE: CPT | Performed by: NURSE PRACTITIONER

## 2024-08-21 PROCEDURE — 83036 HEMOGLOBIN GLYCOSYLATED A1C: CPT | Performed by: NURSE PRACTITIONER

## 2024-08-21 PROCEDURE — G2211 COMPLEX E/M VISIT ADD ON: HCPCS | Performed by: NURSE PRACTITIONER

## 2024-08-21 PROCEDURE — 99214 OFFICE O/P EST MOD 30 MIN: CPT | Performed by: NURSE PRACTITIONER

## 2024-08-21 PROCEDURE — 99207 PR NO BILLABLE SERVICE THIS VISIT: CPT | Mod: 95 | Performed by: SOCIAL WORKER

## 2024-08-21 RX ORDER — FLUTICASONE FUROATE, UMECLIDINIUM BROMIDE AND VILANTEROL TRIFENATATE 200; 62.5; 25 UG/1; UG/1; UG/1
1 POWDER RESPIRATORY (INHALATION) DAILY
Qty: 60 EACH | Refills: 11 | Status: SHIPPED | OUTPATIENT
Start: 2024-08-21

## 2024-08-21 RX ORDER — POTASSIUM CHLORIDE 1500 MG/1
20 TABLET, EXTENDED RELEASE ORAL EVERY MORNING
Qty: 90 TABLET | Refills: 3 | Status: SHIPPED | OUTPATIENT
Start: 2024-08-21

## 2024-08-21 ASSESSMENT — PATIENT HEALTH QUESTIONNAIRE - PHQ9
SUM OF ALL RESPONSES TO PHQ QUESTIONS 1-9: 9
10. IF YOU CHECKED OFF ANY PROBLEMS, HOW DIFFICULT HAVE THESE PROBLEMS MADE IT FOR YOU TO DO YOUR WORK, TAKE CARE OF THINGS AT HOME, OR GET ALONG WITH OTHER PEOPLE: VERY DIFFICULT
SUM OF ALL RESPONSES TO PHQ QUESTIONS 1-9: 9

## 2024-08-21 NOTE — PATIENT INSTRUCTIONS
I ordered the mammogram for you today.  I ordered the same type of mammogram that you have had done in the past.    I refilled your Trelegy inhaler for you as well as your potassium supplement.    Recheck hemoglobin A1c today.    We will see about getting you scheduled for a preop in a couple of weeks for your heart cath.

## 2024-08-21 NOTE — PROGRESS NOTES
Transition Clinic Progress Note    Patient Name:   Randi Cleary Date: August 21, 2024          Service Type: Individual        VISIT TIME START: 1530      VISIT TIME END: 1622      Session Length:  TC Session Length: 45 (38-52 Minutes)    Session #:  6    Attendees: TC Attendees: Client alone    Service Modality:  Service Modality: Video Visit:      Provider verified identity through the following two step process.  Patient provided:  Patient is known previously to provider    Telemedicine Visit: The patient's condition can be safely assessed and treated via synchronous audio and visual telemedicine encounter.      Reason for Telemedicine Visit: Patient has requested telehealth visit    Originating Site (Patient Location): Patient's home    Distant Site (Provider Location): Provider Remote Setting- Home Office    Consent:  The patient/guardian has verbally consented to: the potential risks and benefits of telemedicine (video visit) versus in person care; bill my insurance or make self-payment for services provided; and responsibility for payment of non-covered services.     Patient would like the video invitation sent by:  Send to e-mail at: [email or web address]    Mode of Communication:  Video Conference via [facility]    Distant Location (Provider):  Off-site    As the provider I attest to compliance with applicable laws and regulations related to telemedicine.    Informed Consent and Assessment Methods    This provider and patient discussed HIPAA, and the limits of confidentiality; including mandated reporting, the possibility of collaborative discussions with patient's primary care provider and the multidisciplinary team in the MH clinic during consultation.  Discussed the no show policy, and the benefits and possible unintended consequences of therapy.  Patient indicated understanding Transition Clinic services are short term, solution focused, until a referral can be made and patient can bridge to long  term therapy.  Patient verbally indicated understanding the informed consent.        DATA  Interactive Complexity: No  Crisis: No        Progress Since Last Session (Related to Symptoms / Goals / Homework):   Symptoms: No change continued depression with SI. No plan and intent.   Homework: Completed in session      Episode of Care Goals: Satisfactory progress - ACTION (Actively working towards change); Intervened by reinforcing change plan / affirming steps taken     Current / Ongoing Stressors and Concerns:  Continued symptoms of depression. Discussed ways in which she can still have a meaningful quality of life while dealing with her medical and mental health issues.     Treatment Objective(s) Addressed in This Session:Decrease frequency and intensity of feeling down, depressed, hopeless.  Decrease thoughts that you'd be better off dead or of suicide / self-harm   Intervention:   Solution Focused Brief Therapy: marital conflicts, goals for her manage.   Cognitive Behavioral Therapy (CBT) - Discussed changing thoughts is the path to changing behaviors and feelings. and Solution-Focused Brief Therapy (SFBT) - Change is perpetual, focus on the problematic excerptions. Reality is subjective and social constructed through conversation.    Assessments completed prior to visit:  The following assessments were completed by patient for this visit:  PHQ9:       6/6/2024     8:00 AM 6/27/2024    12:47 PM 7/2/2024     8:23 AM 7/10/2024     9:24 AM 7/17/2024     9:52 AM 7/28/2024    11:39 AM 8/21/2024     7:35 AM   PHQ-9 SCORE   PHQ-9 Total Score MyChart  12 (Moderate depression) 13 (Moderate depression) 13 (Moderate depression) 14 (Moderate depression) 12 (Moderate depression) 9 (Mild depression)   PHQ-9 Total Score 16 12 13 13    13    13 14 12 9     GAD7:       4/4/2024     8:52 AM 4/18/2024    12:34 PM 5/15/2024    11:25 AM 6/6/2024     8:00 AM 6/27/2024    12:48 PM 7/1/2024    12:10 PM 7/17/2024     9:55 AM   CHAVO-7 SCORE    Total Score 13 (moderate anxiety) 17 (severe anxiety) 9 (mild anxiety)  11 (moderate anxiety)  10 (moderate anxiety)   Total Score 13    13    13    13 17    17 9    9    9    9 9 11    11    11    11    11 11 10     CAGE-AID:       6/22/2020     7:12 PM 1/18/2022    11:15 PM 6/6/2024     8:00 AM   CAGE-AID Total Score   Total Score 4 4 4   Total Score MyChart 4 (A total score of 2 or greater is considered clinically significant) 4 (A total score of 2 or greater is considered clinically significant)      PROMIS 10-Global Health (all questions and answers displayed):       1/22/2024    12:00 PM 5/1/2024    11:53 AM 5/15/2024    11:27 AM 6/6/2024     8:00 AM 7/1/2024    12:10 PM 7/10/2024     9:53 AM 7/17/2024    10:01 AM   PROMIS 10   In general, would you say your health is: Fair Fair Fair   Fair Fair   In general, would you say your quality of life is: Poor Poor Poor   Good Poor   In general, how would you rate your physical health? Fair Fair Fair   Fair Fair   In general, how would you rate your mental health, including your mood and your ability to think? Poor Fair Fair   Fair Fair   In general, how would you rate your satisfaction with your social activities and relationships? Poor Fair Poor   Poor Poor   In general, please rate how well you carry out your usual social activities and roles Fair Poor Poor   Poor Fair   To what extent are you able to carry out your everyday physical activities such as walking, climbing stairs, carrying groceries, or moving a chair? Moderately Moderately A little   Moderately A little   In the past 7 days, how often have you been bothered by emotional problems such as feeling anxious, depressed, or irritable? Often Often Sometimes   Sometimes Often   In the past 7 days, how would you rate your fatigue on average? Severe Severe Severe   Mild Moderate   In the past 7 days, how would you rate your pain on average, where 0 means no pain, and 10 means worst imaginable pain? 7 7 8   7  "7   In general, would you say your health is: 2 2 2 2 3 2 2   In general, would you say your quality of life is: 1 1 1 2 2 3 1   In general, how would you rate your physical health? 2 2 2 2 2 2 2   In general, how would you rate your mental health, including your mood and your ability to think? 1 2 2 2 3 2 2   In general, how would you rate your satisfaction with your social activities and relationships? 1 2 1 1 1 1 1   In general, please rate how well you carry out your usual social activities and roles. (This includes activities at home, at work and in your community, and responsibilities as a parent, child, spouse, employee, friend, etc.) 2 1 1 1 1 1 2   To what extent are you able to carry out your everyday physical activities such as walking, climbing stairs, carrying groceries, or moving a chair? 3 3 2 2 3 3 2   In the past 7 days, how often have you been bothered by emotional problems such as feeling anxious, depressed, or irritable? 4 4 3 3 4 3 4   In the past 7 days, how would you rate your fatigue on average? 4 4 4 4 2 2 3   In the past 7 days, how would you rate your pain on average, where 0 means no pain, and 10 means worst imaginable pain? 7 7 8 8 7 7 7   Global Mental Health Score 5 7    7    7 7    7 8 8 9    9    9    9    9    9 6   Global Physical Health Score 9 9    9    9 8    8 8 11 11    11    11    11    11    11 9   PROMIS TOTAL - SUBSCORES 14 16    16    16 15    15 16 19 20    20    20    20    20    20 15     [place] Suicide Severity Rating Scale (Lifetime/Recent)      5/5/2020     9:21 AM 6/11/2020    10:00 AM 1/9/2023     1:00 PM 5/21/2024     4:49 PM 5/21/2024     9:00 PM 6/6/2024     8:00 AM 7/10/2024    12:00 PM   [place] Suicide Severity Rating (Lifetime/Recent)   Q1 Wish to be Dead (Lifetime) Yes Yes   Yes     Comments \"When I was going through a couple of  years of counseling from a dysfunctional family about 30 years ago or more\" when patient was in treatment and there were " "family concerns   OD on medications     Q2 Non-Specific Active Suicidal Thoughts (Lifetime) Yes Yes   No     Non-Specific Active Suicidal Thought Description (Lifetime) Had thoughts of killing self         Most Severe Ideation Rating (Lifetime) 5 5   5     Most Severe Ideation Description (Lifetime) 35 years ago \"I just wanted to check out , I had thought of it so much and wanted to be done\" when family problems were happening   OD on medication     Frequency (Lifetime) 4    3     Duration (Lifetime) 2    3     Controllability (Lifetime) 3 0   2     Protective Factors  (Lifetime) 2 5   4     Reasons for Ideation (Lifetime) 5    5     Q1 Wished to be Dead (Past Month)   yes 1-->yes 1-->yes 1-->yes    Q2 Suicidal Thoughts (Past Month)   no 1-->yes 1-->yes 1-->yes    Q3 Suicidal Thought Method   no 0-->no 0-->no 1-->yes    Q4 Suicidal Intent without Specific Plan   no 1-->yes 0-->no 0-->no    Q5 Suicide Intent with Specific Plan   no 0-->no 0-->no 1-->yes    Q6 Suicide Behavior (Lifetime)   yes 1-->yes 0-->no 1-->yes    If yes to Q6, within past 3 months?   no 1-->yes 0-->no 0-->no    Level of Risk per Screen   moderate risk high risk moderate risk high risk    RETIRED: 1. Wish to be Dead (Recent) No No        RETIRED: 2. Non-Specific Active Suicidal Thoughts (Recent) No No        3. Active Suicidal Ideation with any Methods (Not Plan) Without Intent to Act (Lifetime) Yes Yes        RETIRE: Active Suicidal Ideation with any Methods (Not Plan) Description (Lifetime) Took many pills of Prozac and was sent to the ED 30 years prior        RETIRED: 3. Active Suicidal Ideation with any Methods (Not Plan) Without Intent to Act (Recent) No         RETIRE: 4. Active Suicidal Ideation with Some Intent to Act, Without Specific Plan (Lifetime) Yes Yes        RETIRE: Active Suicidal Ideation with Some Intent to Act, Without Specific Plan Description (Lifetime) Took many pills of Prozac and was sent to the ED         4. Active " Suicidal Ideation with Some Intent to Act, Without Specific Plan (Recent) No         RETIRE: 5. Active Suicidal Ideation with Specific Plan and Intent (Lifetime) Yes Yes        RETIRE: Active Suicidal Ideation with Specific Plan and Intent Description (Lifetime) Took many pills of Prozac and was sent to the ED over 30 years ago         RETIRED: 5. Active Suicidal Ideation with Specific Plan and Intent (Recent) No         Most Severe Ideation Rating (Past Month) NA         Frequency (Past Month) NA         Duration (Past Month) NA         Controllability (Past Month) NA         Protective Factors (Past Month) NA         Reasons for Ideation (Past Month) NA         Actual Attempt (Lifetime) Yes Yes        Actual Attempt Description (Lifetime) Took a bunch of pills patient reported overdosing on Prozac about thirty years ago        Total Number of Actual Attempts (Lifetime) 1 1        Actual Attempt (Past 3 Months) No No        Has subject engaged in non-suicidal self-injurious behavior? (Lifetime) Yes Yes        Has subject engaged in non-suicidal self-injurious behavior? (Past 3 Months) No No        Interrupted Attempts (Lifetime) No No        Interrupted Attempts (Past 3 Months) No No        Aborted or Self-Interrupted Attempt (Lifetime) No No        Total Number Aborted or Self Interrupted Attempts (Lifetime) 0         Aborted or Self-Interrupted Attempt (Past 3 Months) No No        Preparatory Acts or Behavior (Lifetime) No No        Preparatory Acts or Behavior (Past 3 Months) No No        Most Recent Attempt Date 10/1/1984         Comments  30 years prior        Most Recent Attempt Actual Lethality Code 2 0        Comments This is a guess/pt. doesn't recall exact month or day         Most Lethal Attempt Actual Lethality Code 2         Comments only 1 attempt/same attempt as most recent & initial         Initial/First Attempt Date 10/1/1984         Initial/First Attempt Actual Lethality Code 2         Q1 Wish to  be Dead (Lifetime)       N   Q2 Non-Specific Active Suicidal Thoughts (Lifetime)       N         ASSESSMENT: Current Emotional / Mental Status (status of significant symptoms):   Risk status (Self / Other harm or suicidal ideation)   Patient denies current fears or concerns for personal safety.   Patient denies current or recent suicidal ideation or behaviors.   Patient denies current or recent homicidal ideation or behaviors.   Patient denies current or recent self injurious behavior or ideation.   Patient denies other safety concerns.   Patient reports there has been no change in risk factors since their last session.     Patient reports there has been no change in protective factors since their last session.     Recommended that patient call 911 or go to the local ED should there be a change in any of these risk factors.     Appearance:   Appropriate    Eye Contact:   Good    Psychomotor Behavior: Normal    Attitude:   Cooperative    Orientation:   All   Speech    Rate / Production: Normal     Volume:  Normal    Mood:    Normal   Affect:    Appropriate    Thought Content:  Clear    Thought Form:  Coherent  Logical    Insight:    Good      Medication Review:   No changes to current psychiatric medication(s)     Medication Compliance:   Yes     Changes in Health Issues:   None reported     Chemical Use Review:   Substance Use: Chemical use reviewed, no active concerns identified      Tobacco Use: No current tobacco use.      Diagnoses:  296.33 (F33.2) Major Depressive Disorder, Recurrent Episode, Severe _  300.02 (F41.1) Generalized Anxiety Disorder      Collateral Reports Completed:    [facility].    PLAN: (Patient Tasks / Therapist Tasks / Other)  Follow-up visit   2.   Will continue with ECT          treatment bi-weekly.  3.   Support  Group (virtual)           Interventional Psych.for          Depression.     Is this the patient's last discharge?:  No    Procedure Code: Psychotherapy (with patient) - 45 [CPT 22336]    Alee Hayes, [facility]  8/21/24

## 2024-08-21 NOTE — PROGRESS NOTES
"  Assessment & Plan     Type 2 diabetes mellitus without complication, without long-term current use of insulin (H)  Seems as though this is diet controlled.  She had been on metformin in the past.  Due for A1c  - HEMOGLOBIN A1C; Future    Screen for colon cancer  She has a screening colonoscopy set up in October in the hospital    Pulmonary hypertension (H)  Right sided heart cath planned for September.  She is early for preop today, we will get her on the schedule for a formal preop prior to that procedure  - KLOR-CON M20 20 MEQ CR tablet; Take 1 tablet (20 mEq) by mouth every morning.    Chronic obstructive pulmonary disease, unspecified COPD type (H)  COPD is controlled with Trelegy. She had problems getting her Trelegy inhaler filled.  This was done for her today  - Fluticasone-Umeclidin-Vilanterol (TRELEGY ELLIPTA) 200-62.5-25 MCG/ACT oral inhaler; Inhale 1 puff into the lungs daily.    Encounter for screening mammogram for breast cancer  - MA Contrast Enhanced Digital Mammogram; Future    History of left breast cancer  - MA Contrast Enhanced Digital Mammogram; Future    Depression  Managed by psychiatry.  Doing ECT.  On Viibryd.  History of serotonin syndrome (?)    Patient Instructions   I ordered the mammogram for you today.  I ordered the same type of mammogram that you have had done in the past.    I refilled your Trelegy inhaler for you as well as your potassium supplement.    Recheck hemoglobin A1c today.    We will see about getting you scheduled for a preop in a couple of weeks for your heart cath.      The longitudinal plan of care for the diagnosis(es)/condition(s) as documented were addressed during this visit. Due to the added complexity in care, I will continue to support Winnie in the subsequent management and with ongoing continuity of care.        BMI  Estimated body mass index is 28.25 kg/m  as calculated from the following:    Height as of this encounter: 1.676 m (5' 6\").    Weight as of this " "encounter: 79.4 kg (175 lb).             Subjective   Winnie is a 69 year old, presenting for the following health issues:  Establish Care and Follow Up (Medication check)      8/21/2024     8:51 AM   Additional Questions   Roomed by      Via the Health Maintenance questionnaire, the patient has reported the following services have been completed -Eye Exam: [facility] 2024-08-13-Mammogram: [facility] 2024-08-01, this information has been sent to the abstraction team.  History of Present Illness       Reason for visit:  Rx prescription refill    She eats 2-3 servings of fruits and vegetables daily.She consumes 0 sweetened beverage(s) daily.She exercises with enough effort to increase her heart rate 10 to 19 minutes per day.  She exercises with enough effort to increase her heart rate 3 or less days per week.   She is taking medications regularly.       Left breast cancer 40 years ago and was on Cytoxin. She is having problems with breast again. She had radiation therapy.    She delayed her heart cauterization. She has pulmonary hypertension and it sounds like this was planned for surveillance. She sees cardiology for this, Dr. Edwards.    She has a colonoscopy scheduled. This is for surveillance/screening.     She has shoulder problems. Has been told that she needs new shoulders.    She has chronic pain. Takes narcotics. Sees pain clinic, Dr. Dubois. Using oxycodone.     Sees psychiatry. Doing ECT every two weeks. Started vilazodone a couple of weeks ago. Having memory loss, says that it is a \"different type\" of memory loss. Says that mood has been better lately. History of serotonin syndrome.     She is .  for 50 years.  is helpful with her problems.     Right [facility] was [facility] in [facility].                    Objective    /60 (BP Location: Right arm, Patient Position: Sitting, Cuff Size: Adult Regular)   Pulse 87   Temp 97.7  F (36.5  C) (Oral)   Resp 16   Ht 1.676 m (5' 6\")   Wt 79.4 " kg (175 lb)   LMP  (LMP Unknown)   SpO2 97%   Breastfeeding No   BMI 28.25 kg/m    Body mass index is 28.25 kg/m .  Physical Exam   Tearful at times. Otherwise healthy appearing and in no acute distress            Signed Electronically by: Kaushik Hobbs, SOLO

## 2024-08-22 ENCOUNTER — ONCOLOGY VISIT (OUTPATIENT)
Dept: ONCOLOGY | Facility: CLINIC | Age: 69
End: 2024-08-22
Attending: INTERNAL MEDICINE
Payer: MEDICARE

## 2024-08-22 ENCOUNTER — APPOINTMENT (OUTPATIENT)
Dept: LAB | Facility: CLINIC | Age: 69
End: 2024-08-22
Attending: INTERNAL MEDICINE
Payer: MEDICARE

## 2024-08-22 VITALS
DIASTOLIC BLOOD PRESSURE: 75 MMHG | RESPIRATION RATE: 16 BRPM | OXYGEN SATURATION: 96 % | HEART RATE: 68 BPM | BODY MASS INDEX: 28.18 KG/M2 | TEMPERATURE: 98 F | WEIGHT: 174.6 LBS | SYSTOLIC BLOOD PRESSURE: 124 MMHG

## 2024-08-22 DIAGNOSIS — E83.110 HEREDITARY HEMOCHROMATOSIS (H): Primary | ICD-10-CM

## 2024-08-22 LAB
ALBUMIN SERPL BCG-MCNC: 4 G/DL (ref 3.5–5.2)
ALP SERPL-CCNC: 72 U/L (ref 40–150)
ALT SERPL W P-5'-P-CCNC: 14 U/L (ref 0–50)
ANION GAP SERPL CALCULATED.3IONS-SCNC: 9 MMOL/L (ref 7–15)
AST SERPL W P-5'-P-CCNC: 22 U/L (ref 0–45)
BASOPHILS # BLD AUTO: 0 10E3/UL (ref 0–0.2)
BASOPHILS NFR BLD AUTO: 0 %
BILIRUB SERPL-MCNC: 0.5 MG/DL
BUN SERPL-MCNC: 11.4 MG/DL (ref 8–23)
CALCIUM SERPL-MCNC: 9.3 MG/DL (ref 8.8–10.4)
CHLORIDE SERPL-SCNC: 106 MMOL/L (ref 98–107)
CREAT SERPL-MCNC: 0.57 MG/DL (ref 0.51–0.95)
EGFRCR SERPLBLD CKD-EPI 2021: >90 ML/MIN/1.73M2
EOSINOPHIL # BLD AUTO: 0.1 10E3/UL (ref 0–0.7)
EOSINOPHIL NFR BLD AUTO: 1 %
ERYTHROCYTE [DISTWIDTH] IN BLOOD BY AUTOMATED COUNT: 13 % (ref 10–15)
FERRITIN SERPL-MCNC: 95 NG/ML (ref 11–328)
GLUCOSE SERPL-MCNC: 94 MG/DL (ref 70–99)
HCO3 SERPL-SCNC: 27 MMOL/L (ref 22–29)
HCT VFR BLD AUTO: 49.3 % (ref 35–47)
HGB BLD-MCNC: 16.9 G/DL (ref 11.7–15.7)
HOLD SPECIMEN: NORMAL
IMM GRANULOCYTES # BLD: 0 10E3/UL
IMM GRANULOCYTES NFR BLD: 0 %
IRON BINDING CAPACITY (ROCHE): 263 UG/DL (ref 240–430)
IRON SATN MFR SERPL: 78 % (ref 15–46)
IRON SERPL-MCNC: 204 UG/DL (ref 37–145)
LDH SERPL L TO P-CCNC: 196 U/L (ref 0–250)
LYMPHOCYTES # BLD AUTO: 0.9 10E3/UL (ref 0.8–5.3)
LYMPHOCYTES NFR BLD AUTO: 14 %
MCH RBC QN AUTO: 33.1 PG (ref 26.5–33)
MCHC RBC AUTO-ENTMCNC: 34.3 G/DL (ref 31.5–36.5)
MCV RBC AUTO: 97 FL (ref 78–100)
MONOCYTES # BLD AUTO: 0.4 10E3/UL (ref 0–1.3)
MONOCYTES NFR BLD AUTO: 6 %
NEUTROPHILS # BLD AUTO: 5.3 10E3/UL (ref 1.6–8.3)
NEUTROPHILS NFR BLD AUTO: 79 %
NRBC # BLD AUTO: 0 10E3/UL
NRBC BLD AUTO-RTO: 0 /100
PLATELET # BLD AUTO: 173 10E3/UL (ref 150–450)
POTASSIUM SERPL-SCNC: 4.3 MMOL/L (ref 3.4–5.3)
PROT SERPL-MCNC: 6.8 G/DL (ref 6.4–8.3)
RBC # BLD AUTO: 5.11 10E6/UL (ref 3.8–5.2)
RETICS # AUTO: 0.09 10E6/UL (ref 0.03–0.1)
RETICS/RBC NFR AUTO: 1.7 % (ref 0.5–2)
SODIUM SERPL-SCNC: 142 MMOL/L (ref 135–145)
WBC # BLD AUTO: 6.8 10E3/UL (ref 4–11)

## 2024-08-22 PROCEDURE — 36415 COLL VENOUS BLD VENIPUNCTURE: CPT | Performed by: INTERNAL MEDICINE

## 2024-08-22 PROCEDURE — 99213 OFFICE O/P EST LOW 20 MIN: CPT | Performed by: INTERNAL MEDICINE

## 2024-08-22 PROCEDURE — 85045 AUTOMATED RETICULOCYTE COUNT: CPT | Performed by: INTERNAL MEDICINE

## 2024-08-22 PROCEDURE — G2211 COMPLEX E/M VISIT ADD ON: HCPCS | Performed by: INTERNAL MEDICINE

## 2024-08-22 PROCEDURE — G0463 HOSPITAL OUTPT CLINIC VISIT: HCPCS | Performed by: INTERNAL MEDICINE

## 2024-08-22 PROCEDURE — 85025 COMPLETE CBC W/AUTO DIFF WBC: CPT | Performed by: INTERNAL MEDICINE

## 2024-08-22 PROCEDURE — 84450 TRANSFERASE (AST) (SGOT): CPT | Performed by: INTERNAL MEDICINE

## 2024-08-22 PROCEDURE — 82728 ASSAY OF FERRITIN: CPT | Performed by: INTERNAL MEDICINE

## 2024-08-22 PROCEDURE — 83615 LACTATE (LD) (LDH) ENZYME: CPT | Performed by: INTERNAL MEDICINE

## 2024-08-22 PROCEDURE — 83550 IRON BINDING TEST: CPT | Performed by: INTERNAL MEDICINE

## 2024-08-22 NOTE — NURSING NOTE
Chief Complaint   Patient presents with    Blood Draw     Labs drawn via  by RN in lab, vitals taken.     Labs collected from venipuncture by RN. Vitals taken. Checked in for appointment(s).    Rea Hoffman RN

## 2024-08-22 NOTE — NURSING NOTE
"Oncology Rooming Note    August 22, 2024 11:22 AM   Randi Cleary is a 69 year old female who presents for:    Chief Complaint   Patient presents with    Blood Draw     Labs drawn via  by RN in lab, vitals taken.    Oncology Clinic Visit     Hereditary hemochromatosis      Initial Vitals: /75 (BP Location: Right arm, Patient Position: Sitting, Cuff Size: Adult Regular)   Pulse 68   Temp 98  F (36.7  C) (Oral)   Resp 16   Wt 79.2 kg (174 lb 9.6 oz)   LMP  (LMP Unknown)   SpO2 96%   BMI 28.18 kg/m   Estimated body mass index is 28.18 kg/m  as calculated from the following:    Height as of 8/21/24: 1.676 m (5' 6\").    Weight as of this encounter: 79.2 kg (174 lb 9.6 oz). Body surface area is 1.92 meters squared.  Data Unavailable Comment: Data Unavailable   No LMP recorded (lmp unknown). Patient is postmenopausal.  Allergies reviewed: Yes  Medications reviewed: Yes    Medications: Medication refills not needed today.  Pharmacy name entered into [facility]:    [id number] [place], [place] - [address] - [address] [place] [address]    Frailty Screening:   Is the patient here for a new oncology consult visit in cancer care? 2. No      Clinical concerns: she was in the hospital for anxiety depression, undergoing ECT every other week for maintenance.   Has been experiencing fluid pooling on lower legs and wondering if it could be due to blood pressure cuff.  Freda Gudino              "

## 2024-08-22 NOTE — LETTER
2024      Randi Cleary  [address] [facility] [address]      Dear Colleague,    Thank you for referring your patient, Randi Cleary, to the [facility]. Please see a copy of my visit note below.        [facility] Hematology Progress Note    Outpatient Visit Note:    Patient: Randi Damon  MRN: [id number]  : 1955  YUAN: Aug 22, 2024    Reason for Consultation:  Two copies of the C282Y mutation - hereditary hemochromatosis    Assessment:  In summary, Randi Damon is a 67 year old woman with hereditary hemochromatosis and Past Medical History of KYAW, major depressive disorder, serotonin syndrome, pheochromocytoma,     1. Hereditary hemochromatosis  2. Restless leg syndrome  3. Major depressive disorder  4. Obstructive sleep apnea  5. Polycythemia, likely secondary to to #4    For hereditary hemochromatosis we typically try to remove excess iron by doing serial phlebotomy to reduce iron (ferritin) to goal of 50. However, in Ms. Damon's case, she has severe restless leg and fatigue therefore would permit for target ferritin of 125. Would NOT allow for ferritin to exceed 250 in this patient. Threshold for phlebotomy will be 150.     Ms. Damon has polycythemia. This is NOT related to HH, as iron status is regulated separate from this. This is likely secondary to her untreated KYAW, however if she were to develop leukocytosis or thrombocytosis would consider sending evaluation for MPN.  Currently her platelets and her leukocytes are normal.     Discussed with Ms. Damon the pathophysiology of iron overload disorders today. Given ferritin levels remain <1000 and prior testing has not demonstrated end-organ damage do not suspect her joint issues are related.     Plan:  1. Majority of today's visit was spent counseling the patient regarding iron status, etc.  2. Labs every 2 months, .  3. Phlebotomy for ferritin >125      Follow up  in 6 months with Dr. Bev Pablo MD/PhD   of Medicine  Division of Hematology, Oncology and Transplantation    ----------------------------------------------------------------------------------------------------------------------    Interval History:  Randi Damon is a 68 year old woman who is homozygous for Hereditary hemochromatosis C282Y mutation with complex medical history who presents for follow-up.    Winnie iis on ECT therapy and it is helping with her mood and symptoms. She has also worked on intentional weight loss and has had some success. She is planning on following up with cardiology regarding pulmonary hypertension as well.   Her restless legs are doing ok. Sleeping 4-5h       Past Medical History:  Past Medical History:   Diagnosis Date     Bipolar 2 disorder (H)      Breast cancer (H) 1986    lumpectomy, radiation, chemo     Chronic pain syndrome      COPD (chronic obstructive pulmonary disease) (H)     asthma     Cord compression (H) 12/21/2021     Dizzy      Drug tolerance     opioid     Esophageal reflux      Fatigue      Generalized anxiety disorder      Graves disease 1994     Hemochromatosis 02/14/2018    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; H63D not detected     History of breast cancer 08/28/2020    Formatting of this note might be different from the original. Created by Conversion  Replacement Utility updated for latest IMO load Formatting of this note might be different from the original. Created by Conversion  Replacement Utility updated for latest IMO load     History of corticosteroid therapy 11/19/2019     History of partial adrenalectomy (H24) 11/19/2019     History of pheochromocytoma 11/19/2019     Hx antineoplastic chemotherapy      Hx of radiation therapy      Hyperlipidaemia      Hypertension      Impaired fasting glucose 2017     Injury of neck, whiplash 07/15/2021     Joint pain      KYAW (obstructive sleep apnea) 2016     Osteopenia      Pheochromocytoma,  left 08/02/2017    laparoscopically removed     Postablative hypothyroidism 1995     Prediabetes 10/03/2019    by A1c     Psoriasis      Psoriatic arthropathy (H)      Pulmonary hypertension (H)      Right rotator cuff tear      RLS (restless legs syndrome)     on ropinorole     Sacroiliitis (H24)      Serotonin syndrome 08/28/2020    [facility] - While on desvenlafaxine 100mg     Snoring      Spinal stenosis      Status post coronary angiogram 10/03/2019     Urinary incontinence      Vitamin B 12 deficiency 2009     Vitamin D deficiency 2010     Past Surgical History:  Past Surgical History:   Procedure Laterality Date     ARTHRODESIS ANKLE       ARTHROPLASTY ANKLE Right 6/29/2015    Procedure: ARTHROPLASTY ANKLE;  Surgeon: Jason Coughlin MD;  Location: [facility]     ARTHROPLASTY REVISION ANKLE Right 6/29/2015    Procedure: ARTHROPLASTY REVISION ANKLE;  Surgeon: Jason Coughlin MD;  Location: [facility]     BIOPSY BREAST       BREAST BIOPSY, CORE RT/LT       COLONOSCOPY       COLONOSCOPY N/A 2/25/2021    Procedure: COLONOSCOPY;  Surgeon: Guru Elke Tolbert MD;  Location:  GI     CV CORONARY ANGIOGRAM N/A 10/3/2019    Procedure: CV CORONARY ANGIOGRAM;  Surgeon: Bryce Pierre MD;  Location:  HEART CARDIAC CATH LAB     CV RIGHT HEART CATH MEASUREMENTS RECORDED N/A 10/3/2019    Procedure: CV RIGHT HEART CATH;  Surgeon: Bryce Pierre MD;  Location:  HEART CARDIAC CATH LAB     ESOPHAGOSCOPY, GASTROSCOPY, DUODENOSCOPY (EGD), COMBINED N/A 2/25/2021    Procedure: ESOPHAGOGASTRODUODENOSCOPY, WITH BIOPSY;  Surgeon: Guru Elke Tolbert MD;  Location:  GI     EYE SURGERY  2021     HC REMOVE TONSILS/ADENOIDS,<11 Y/O      Description: Tonsillectomy With Adenoidectomy;  Recorded: 04/07/2010;     IR LUMBAR EPIDURAL STEROID INJECTION  10/26/2004     IR LUMBAR EPIDURAL STEROID INJECTION  11/16/2004     IR LUMBAR EPIDURAL STEROID INJECTION  12/21/2004     IR LUMBAR  EPIDURAL STEROID INJECTION  6/8/2006     JOINT REPLACEMENT       LAMINOPLASTY CERVICAL POSTERIOR THREE+ LEVELS Left 12/21/2021    Procedure: CERVICAL 3-CERVICAL 6 LEFT OPEN DOOR LAMINOPLASTY AND LEFT CERVICAL 4-5 AND CERVICAL 6-7 POSTERIOR FORAMINOTOMY;  Surgeon: Angela Gregory MD;  Location: [facility]     LAPAROSCOPIC ADRENALECTOMY Left 08/02/2017    pheochromocytoma     LAPAROSCOPIC ADRENALECTOMY Left 8/2/2017    Procedure: LAPAROSCOPIC LEFT ADRENALECTOMY, ;  Surgeon: Gab Linares MD;  [facility];  Service:      LENGTHEN TENDON ACHILLES Right 6/29/2015    Procedure: LENGTHEN TENDON ACHILLES;  Surgeon: Jason Coughlin MD;  Location: [facility]     LUMPECTOMY BREAST       LUMPECTOMY BREAST Left 1994     MAMMOPLASTY REDUCTION Right 01/13/2015    [place]     MAMMOPLASTY REDUCTION Right     approx late 2015/early2016     MASTECTOMY      left lumpectomy with axillary node dissection     MASTECTOMY MODIFIED RADICAL       OTHER SURGICAL HISTORY Right     reconstructive breast surgery     OTHER SURGICAL HISTORY      Adrenalectomy for pheochromocytoma     [place] MASTECTOMY, MODIFIED RADICAL      Description: Modified Radical Mastectomy Left Breast;  Recorded: 04/07/2010;     REPAIR HAMMER TOE Right 6/29/2015    Procedure: REPAIR HAMMER TOE;  Surgeon: Jason Coughlin MD;  Location: [facility]     TONSILLECTOMY       TONSILLECTOMY & ADENOIDECTOMY       ZZC ARTHRODESIS,ANKLE,OPEN Right     Description: Ankle Arthrodesis;  Recorded: 04/07/2010;       Medications:  Current Outpatient Medications   Medication Sig Dispense Refill     albuterol (VENTOLIN HFA) 108 (90 Base) MCG/ACT inhaler Inhale 2 puffs into the lungs every 6 hours as needed for shortness of breath, wheezing or cough 18 g 11     allopurinol (ZYLOPRIM) 300 MG tablet Take 1 tablet (300 mg) by mouth every morning       Cyanocobalamin (VITAMIN B-12) 5000 MCG SUBL Place 2-3 sprays under the tongue daily Unknown dose. 2 or 3 sprays/day        ethacrynic acid (EDECRIN) 25 MG tablet Half pill every day (Patient taking differently: Take 0.5 tablets by mouth every morning. Half pill every day) 45 tablet 3     Fluticasone-Umeclidin-Vilanterol (TRELEGY ELLIPTA) 200-62.5-25 MCG/ACT oral inhaler Inhale 1 puff into the lungs daily. 60 each 11     gabapentin (NEURONTIN) 100 MG capsule Take 1 capsule (100 mg) by mouth every 6 hours as needed (anxiety) 120 capsule 1     gabapentin (NEURONTIN) 400 MG capsule Take 1 capsule (400 mg) by mouth at bedtime 30 capsule 1     guaiFENesin (MUCINEX) 600 MG 12 hr tablet Take 1,200 mg by mouth 2 times daily as needed for congestion       KLOR-CON M20 20 MEQ CR tablet Take 1 tablet (20 mEq) by mouth every morning. 90 tablet 3     MAGNESIUM PO Take 1 capsule by mouth 2 times daily Strength unknown       medical cannabis (Patient's own supply) See Admin Instructions (The purpose of this order is to document that the patient reports taking medical cannabis.  This is not a prescription, and is not used to certify that the patient has a qualifying medical condition.)  Flower       melatonin 3 MG tablet Take 1 tablet (3 mg) by mouth nightly as needed for sleep 30 tablet 1     naloxone (NARCAN) 4 MG/0.1ML nasal spray Spray 1 spray (4 mg) into one nostril alternating nostrils as needed for opioid reversal every 2-3 minutes until assistance arrives 0.2 mL 0     omeprazole (PRILOSEC) 20 MG DR capsule Take 1 capsule (20 mg) by mouth every morning 90 capsule 3     oxyCODONE (ROXICODONE) 5 MG tablet Take 1 tablet (5 mg) by mouth 5 times daily May dispense/start 5/8/24 225 tablet 0     rOPINIRole (REQUIP) 2 MG tablet Take 1 tablet (2 mg) by mouth 3 times daily One tab 3 pm, one bedtime, and one during night on waking 270 tablet 3     STATIN NOT PRESCRIBED (INTENTIONAL) Please choose reason not prescribed from choices below.       SYNTHROID 150 MCG tablet Take 1 tablet (150 mcg) by mouth every morning MON to SAT 1 tablet/day; SUN 0.5 tablet    "    vilazodone (VIIBRYD) 10 MG TABS tablet Take 1 tablet (10 mg) by mouth daily with food 30 tablet 0     vitamin C (ASCORBIC ACID) 1000 MG TABS Take 1,000 mg by mouth daily       vitamin D3 (CHOLECALCIFEROL) 250 mcg (55225 units) capsule Take 1 capsule by mouth once a week          Allergies:  Allergies   Allergen Reactions     Serotonin Reuptake Inhibitors (Ssris) Anxiety, Difficulty breathing, Headache, Palpitations and Shortness Of Breath     Buspirone      The patient states she had serotonin syndrome     Cephalexin      Other reaction(s): unknown rxn.     Desvenlafaxine      Serotonin syndrome     Diclofenac Sodium [Diclofenac]      Serotonin syndrome and restless legs syndrome     Gabapentin      Drove on the wrong side of the highway     Levofloxacin      \"CAN'T REMEMBER\"     Penicillins      \"SORES IN MOUTH\"     Riluzole Difficulty breathing and Swelling     Sulfa Antibiotics      \"PT DOES NOT KNOW WHAT THE REACTION WAS\"     Topiramate Other (See Comments)     Frequent urination       ROS:  A 10 point ROS is negative except as stated in the HPI    Social History:  Denies any tobacco use. Denies any illicit drug use.     Family History:  Reviewed- no interval updates    Vitals: , Wt: 174 lbs 9.6 oz  GENERAL: alert and no distress  EYES: Eyes grossly normal to inspection.  No discharge or erythema, or obvious scleral/conjunctival abnormalities.  RESP: No audible wheeze, cough, or visible cyanosis.    SKIN: Visible skin clear. No significant rash, abnormal pigmentation or lesions.  NEURO: Cranial nerves grossly intact.  Mentation and speech appropriate for age.  PSYCH: mentation appears normal with some mild anxiety and appearance well groomed        Labs: personally reviewed with relevant trends annotated below  Ferritin   Date Value Ref Range Status   08/22/2024 95 11 - 328 ng/mL Final   03/29/2024 71 11 - 328 ng/mL Final   01/22/2024 70 11 - 328 ng/mL Final   09/25/2023 61 11 - 328 ng/mL Final   07/05/2023 " 134 11 - 328 ng/mL Final   05/01/2023 64 11 - 328 ng/mL Final   02/05/2021 48 8 - 252 ng/mL Final   10/30/2020 20 8 - 252 ng/mL Final   02/12/2020 19 8 - 252 ng/mL Final   09/24/2019 33 8 - 252 ng/mL Final   09/19/2019 36 8 - 252 ng/mL Final   09/13/2019 42 8 - 252 ng/mL Final     Iron   Date Value Ref Range Status   08/22/2024 204 (H) 37 - 145 ug/dL Final   01/22/2024 171 (H) 37 - 145 ug/dL Final   07/25/2023 229 (H) 37 - 145 ug/dL Final   05/01/2023 85 37 - 145 ug/dL Final   01/18/2023 185 (H) 37 - 145 ug/dL Final   11/04/2022 201 (H) 37 - 145 ug/dL Final   07/01/2022 127 35 - 180 ug/dL Final   06/07/2022 216 (H) 35 - 180 ug/dL Final   02/05/2021 141 35 - 180 ug/dL Final   10/30/2020 52 35 - 180 ug/dL Final   09/24/2019 92 35 - 180 ug/dL Final   08/13/2019 237 (H) 35 - 180 ug/dL Final     WBC   Date Value Ref Range Status   02/05/2021 6.9 4.0 - 11.0 10e9/L Final     WBC Count   Date Value Ref Range Status   08/22/2024 6.8 4.0 - 11.0 10e3/uL Final     Hemoglobin   Date Value Ref Range Status   08/22/2024 16.9 (H) 11.7 - 15.7 g/dL Final   08/06/2024 17.7 (H) 11.7 - 15.7 g/dL Final   06/04/2024 17.0 (H) 11.7 - 15.7 g/dL Final   05/22/2024 17.7 (H) 11.7 - 15.7 g/dL Final   05/21/2024 17.7 (H) 11.7 - 15.7 g/dL Final   03/29/2024 17.2 (H) 11.7 - 15.7 g/dL Final   02/05/2021 17.7 (H) 11.7 - 15.7 g/dL Final   10/30/2020 15.4 11.7 - 15.7 g/dL Final   03/09/2020 14.3 11.7 - 15.7 g/dL Final   02/12/2020 14.0 11.7 - 15.7 g/dL Final   10/03/2019 14.6 11.7 - 15.7 g/dL Final   09/24/2019 15.7 11.7 - 15.7 g/dL Final       Imaging:  NA         Again, thank you for allowing me to participate in the care of your patient.        Sincerely,        Tova Pablo MD

## 2024-08-22 NOTE — PATIENT INSTRUCTIONS
Continue with higher protein diet  Schedule phlebotomy session within next 3 months    Follow up with Dr. Pablo in 6 months.     Tova Pablo MD/PhD  [facility]  of Medicine  Division of Hematology, Oncology and Transplantation    [facility]  [address] [place] [address], Mail Code [id number]  [place] [address]    Clinic Scheduling and Nurse Line: [phone], option 5, option 2    [facility] RN Care Coordinator:   Crissy Musa  Direct line:   (P) [phone]  (F) [phone]

## 2024-08-22 NOTE — PROGRESS NOTES
[facility] Hematology Progress Note    Outpatient Visit Note:    Patient: Randi Damon  MRN: [id number]  : 1955  YUAN: Aug 22, 2024    Reason for Consultation:  Two copies of the C282Y mutation - hereditary hemochromatosis    Assessment:  In summary, Randi Damon is a 67 year old woman with hereditary hemochromatosis and Past Medical History of KYAW, major depressive disorder, serotonin syndrome, pheochromocytoma,     1. Hereditary hemochromatosis  2. Restless leg syndrome  3. Major depressive disorder  4. Obstructive sleep apnea  5. Polycythemia, likely secondary to to #4    For hereditary hemochromatosis we typically try to remove excess iron by doing serial phlebotomy to reduce iron (ferritin) to goal of 50. However, in Ms. Damon's case, she has severe restless leg and fatigue therefore would permit for target ferritin of 125. Would NOT allow for ferritin to exceed 250 in this patient. Threshold for phlebotomy will be 150.     Ms. Damon has polycythemia. This is NOT related to HH, as iron status is regulated separate from this. This is likely secondary to her untreated KYAW, however if she were to develop leukocytosis or thrombocytosis would consider sending evaluation for MPN.  Currently her platelets and her leukocytes are normal.     Discussed with Ms. Damon the pathophysiology of iron overload disorders today. Given ferritin levels remain <1000 and prior testing has not demonstrated end-organ damage do not suspect her joint issues are related.     Plan:  1. Majority of today's visit was spent counseling the patient regarding iron status, etc.  2. Labs every 2 months, .  3. Phlebotomy for ferritin >125      Follow up in 6 months with Dr. Bev Pablo MD/PhD   of Medicine  Division of Hematology, Oncology and  Transplantation    ----------------------------------------------------------------------------------------------------------------------    Interval History:  Randi Damon is a 68 year old woman who is homozygous for Hereditary hemochromatosis C282Y mutation with complex medical history who presents for follow-up.    Winnie iis on ECT therapy and it is helping with her mood and symptoms. She has also worked on intentional weight loss and has had some success. She is planning on following up with cardiology regarding pulmonary hypertension as well.   Her restless legs are doing ok. Sleeping 4-5h       Past Medical History:  Past Medical History:   Diagnosis Date    Bipolar 2 disorder (H)     Breast cancer (H) 1986    lumpectomy, radiation, chemo    Chronic pain syndrome     COPD (chronic obstructive pulmonary disease) (H)     asthma    Cord compression (H) 12/21/2021    Dizzy     Drug tolerance     opioid    Esophageal reflux     Fatigue     Generalized anxiety disorder     Graves disease 1994    Hemochromatosis 02/14/2018    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 Created by Conversion  Replacement Utility updated for latest IMO load Formatting of this note might be different from the original. Created by Conversion  Replacement Utility updated for latest IMO load    History of corticosteroid therapy 11/19/2019    History of partial adrenalectomy (H24) 11/19/2019    History of pheochromocytoma 11/19/2019    Hx antineoplastic chemotherapy     Hx of radiation therapy     Hyperlipidaemia     Hypertension     Impaired fasting glucose 2017    Injury of neck, whiplash 07/15/2021    Joint pain     KYAW (obstructive sleep apnea) 2016    Osteopenia     Pheochromocytoma, left 08/02/2017    laparoscopically removed    Postablative hypothyroidism 1995    Prediabetes 10/03/2019    by A1c    Psoriasis     Psoriatic arthropathy  (H)     Pulmonary hypertension (H)     Right rotator cuff tear     RLS (restless legs syndrome)     on ropinorole    Sacroiliitis (H24)     Serotonin syndrome 08/28/2020    [facility] - While on desvenlafaxine 100mg    Snoring     Spinal stenosis     Status post coronary angiogram 10/03/2019    Urinary incontinence     Vitamin B 12 deficiency 2009    Vitamin D deficiency 2010     Past Surgical History:  Past Surgical History:   Procedure Laterality Date    ARTHRODESIS ANKLE      ARTHROPLASTY ANKLE Right 6/29/2015    Procedure: ARTHROPLASTY ANKLE;  Surgeon: Jason Coughlin MD;  Location: [facility]    ARTHROPLASTY REVISION ANKLE Right 6/29/2015    Procedure: ARTHROPLASTY REVISION ANKLE;  Surgeon: Jason Coughlin MD;  Location: [facility]    BIOPSY BREAST      BREAST BIOPSY, CORE RT/LT      COLONOSCOPY      COLONOSCOPY N/A 2/25/2021    Procedure: COLONOSCOPY;  Surgeon: Guru Elke Tolbert MD;  Location:  GI    CV CORONARY ANGIOGRAM N/A 10/3/2019    Procedure: CV CORONARY ANGIOGRAM;  Surgeon: Bryce Pierre MD;  Location:  HEART CARDIAC CATH LAB    CV RIGHT HEART CATH MEASUREMENTS RECORDED N/A 10/3/2019    Procedure: CV RIGHT HEART CATH;  Surgeon: Bryce Pierre MD;  Location:  HEART CARDIAC CATH LAB    ESOPHAGOSCOPY, GASTROSCOPY, DUODENOSCOPY (EGD), COMBINED N/A 2/25/2021    Procedure: ESOPHAGOGASTRODUODENOSCOPY, WITH BIOPSY;  Surgeon: Guru Elke Tolbert MD;  Location:  GI    EYE SURGERY  2021    HC REMOVE TONSILS/ADENOIDS,<13 Y/O      Description: Tonsillectomy With Adenoidectomy;  Recorded: 04/07/2010;    IR LUMBAR EPIDURAL STEROID INJECTION  10/26/2004    IR LUMBAR EPIDURAL STEROID INJECTION  11/16/2004    IR LUMBAR EPIDURAL STEROID INJECTION  12/21/2004    IR LUMBAR EPIDURAL STEROID INJECTION  6/8/2006    JOINT REPLACEMENT      LAMINOPLASTY CERVICAL POSTERIOR THREE+ LEVELS Left 12/21/2021    Procedure: CERVICAL 3-CERVICAL 6 LEFT OPEN DOOR  LAMINOPLASTY AND LEFT CERVICAL 4-5 AND CERVICAL 6-7 POSTERIOR FORAMINOTOMY;  Surgeon: Angela Gregory MD;  Location: [facility] Main OR    LAPAROSCOPIC ADRENALECTOMY Left 08/02/2017    pheochromocytoma    LAPAROSCOPIC ADRENALECTOMY Left 8/2/2017    Procedure: LAPAROSCOPIC LEFT ADRENALECTOMY, ;  Surgeon: Gab Linares MD;  Location: [facility] Main OR;  Service:     LENGTHEN TENDON ACHILLES Right 6/29/2015    Procedure: LENGTHEN TENDON ACHILLES;  Surgeon: Jason Coughlin MD;  Location: [facility]    LUMPECTOMY BREAST      LUMPECTOMY BREAST Left 1994    MAMMOPLASTY REDUCTION Right 01/13/2015    [place]    MAMMOPLASTY REDUCTION Right     approx late 2015/early2016    MASTECTOMY      left lumpectomy with axillary node dissection    MASTECTOMY MODIFIED RADICAL      OTHER SURGICAL HISTORY Right     reconstructive breast surgery    OTHER SURGICAL HISTORY      Adrenalectomy for pheochromocytoma    TX MASTECTOMY, MODIFIED RADICAL      Description: Modified Radical Mastectomy Left Breast;  Recorded: 04/07/2010;    REPAIR HAMMER TOE Right 6/29/2015    Procedure: REPAIR HAMMER TOE;  Surgeon: Jason Coughlin MD;  Location: [facility]    TONSILLECTOMY      TONSILLECTOMY & ADENOIDECTOMY      [facility] ARTHRODESIS,ANKLE,OPEN Right     Description: Ankle Arthrodesis;  Recorded: 04/07/2010;       Medications:  Current Outpatient Medications   Medication Sig Dispense Refill    albuterol (VENTOLIN HFA) 108 (90 Base) MCG/ACT inhaler Inhale 2 puffs into the lungs every 6 hours as needed for shortness of breath, wheezing or cough 18 g 11    allopurinol (ZYLOPRIM) 300 MG tablet Take 1 tablet (300 mg) by mouth every morning      Cyanocobalamin (VITAMIN B-12) 5000 MCG SUBL Place 2-3 sprays under the tongue daily Unknown dose. 2 or 3 sprays/day      ethacrynic acid (EDECRIN) 25 MG tablet Half pill every day (Patient taking differently: Take 0.5 tablets by mouth every morning. Half pill every day) 45 tablet 3    Fluticasone-Umeclidin-Vilanterol  (TRELEGY ELLIPTA) 200-62.5-25 MCG/ACT oral inhaler Inhale 1 puff into the lungs daily. 60 each 11    gabapentin (NEURONTIN) 100 MG capsule Take 1 capsule (100 mg) by mouth every 6 hours as needed (anxiety) 120 capsule 1    gabapentin (NEURONTIN) 400 MG capsule Take 1 capsule (400 mg) by mouth at bedtime 30 capsule 1    guaiFENesin (MUCINEX) 600 MG 12 hr tablet Take 1,200 mg by mouth 2 times daily as needed for congestion      KLOR-CON M20 20 MEQ CR tablet Take 1 tablet (20 mEq) by mouth every morning. 90 tablet 3    MAGNESIUM PO Take 1 capsule by mouth 2 times daily Strength unknown      medical cannabis (Patient's own supply) See Admin Instructions (The purpose of this order is to document that the patient reports taking medical cannabis.  This is not a prescription, and is not used to certify that the patient has a qualifying medical condition.)  Flower      melatonin 3 MG tablet Take 1 tablet (3 mg) by mouth nightly as needed for sleep 30 tablet 1    naloxone (NARCAN) 4 MG/0.1ML nasal spray Spray 1 spray (4 mg) into one nostril alternating nostrils as needed for opioid reversal every 2-3 minutes until assistance arrives 0.2 mL 0    omeprazole (PRILOSEC) 20 MG DR capsule Take 1 capsule (20 mg) by mouth every morning 90 capsule 3    oxyCODONE (ROXICODONE) 5 MG tablet Take 1 tablet (5 mg) by mouth 5 times daily May dispense/start 5/8/24 225 tablet 0    rOPINIRole (REQUIP) 2 MG tablet Take 1 tablet (2 mg) by mouth 3 times daily One tab 3 pm, one bedtime, and one during night on waking 270 tablet 3    STATIN NOT PRESCRIBED (INTENTIONAL) Please choose reason not prescribed from choices below.      SYNTHROID 150 MCG tablet Take 1 tablet (150 mcg) by mouth every morning MON to SAT 1 tablet/day; SUN 0.5 tablet      vilazodone (VIIBRYD) 10 MG TABS tablet Take 1 tablet (10 mg) by mouth daily with food 30 tablet 0    vitamin C (ASCORBIC ACID) 1000 MG TABS Take 1,000 mg by mouth daily      vitamin D3 (CHOLECALCIFEROL) 250 mcg  "(86201 units) capsule Take 1 capsule by mouth once a week          Allergies:  Allergies   Allergen Reactions    Serotonin Reuptake Inhibitors (Ssris) Anxiety, Difficulty breathing, Headache, Palpitations and Shortness Of Breath    Buspirone      The patient states she had serotonin syndrome    Cephalexin      Other reaction(s): unknown rxn.    Desvenlafaxine      Serotonin syndrome    Diclofenac Sodium [Diclofenac]      Serotonin syndrome and restless legs syndrome    Gabapentin      Drove on the wrong side of the highway    Levofloxacin      \"CAN'T REMEMBER\"    Penicillins      \"SORES IN MOUTH\"    Riluzole Difficulty breathing and Swelling    Sulfa Antibiotics      \"PT DOES NOT KNOW WHAT THE REACTION WAS\"    Topiramate Other (See Comments)     Frequent urination       ROS:  A 10 point ROS is negative except as stated in the HPI    Social History:  Denies any tobacco use. Denies any illicit drug use.     Family History:  Reviewed- no interval updates    Vitals: , Wt: 174 lbs 9.6 oz  GENERAL: alert and no distress  EYES: Eyes grossly normal to inspection.  No discharge or erythema, or obvious scleral/conjunctival abnormalities.  RESP: No audible wheeze, cough, or visible cyanosis.    SKIN: Visible skin clear. No significant rash, abnormal pigmentation or lesions.  NEURO: Cranial nerves grossly intact.  Mentation and speech appropriate for age.  PSYCH: mentation appears normal with some mild anxiety and appearance well groomed        Labs: personally reviewed with relevant trends annotated below  Ferritin   Date Value Ref Range Status   08/22/2024 95 11 - 328 ng/mL Final   03/29/2024 71 11 - 328 ng/mL Final   01/22/2024 70 11 - 328 ng/mL Final   09/25/2023 61 11 - 328 ng/mL Final   07/05/2023 134 11 - 328 ng/mL Final   05/01/2023 64 11 - 328 ng/mL Final   02/05/2021 48 8 - 252 ng/mL Final   10/30/2020 20 8 - 252 ng/mL Final   02/12/2020 19 8 - 252 ng/mL Final   09/24/2019 33 8 - 252 ng/mL Final   09/19/2019 36 8 - " 252 ng/mL Final   09/13/2019 42 8 - 252 ng/mL Final     Iron   Date Value Ref Range Status   08/22/2024 204 (H) 37 - 145 ug/dL Final   01/22/2024 171 (H) 37 - 145 ug/dL Final   07/25/2023 229 (H) 37 - 145 ug/dL Final   05/01/2023 85 37 - 145 ug/dL Final   01/18/2023 185 (H) 37 - 145 ug/dL Final   11/04/2022 201 (H) 37 - 145 ug/dL Final   07/01/2022 127 35 - 180 ug/dL Final   06/07/2022 216 (H) 35 - 180 ug/dL Final   02/05/2021 141 35 - 180 ug/dL Final   10/30/2020 52 35 - 180 ug/dL Final   09/24/2019 92 35 - 180 ug/dL Final   08/13/2019 237 (H) 35 - 180 ug/dL Final     WBC   Date Value Ref Range Status   02/05/2021 6.9 4.0 - 11.0 10e9/L Final     WBC Count   Date Value Ref Range Status   08/22/2024 6.8 4.0 - 11.0 10e3/uL Final     Hemoglobin   Date Value Ref Range Status   08/22/2024 16.9 (H) 11.7 - 15.7 g/dL Final   08/06/2024 17.7 (H) 11.7 - 15.7 g/dL Final   06/04/2024 17.0 (H) 11.7 - 15.7 g/dL Final   05/22/2024 17.7 (H) 11.7 - 15.7 g/dL Final   05/21/2024 17.7 (H) 11.7 - 15.7 g/dL Final   03/29/2024 17.2 (H) 11.7 - 15.7 g/dL Final   02/05/2021 17.7 (H) 11.7 - 15.7 g/dL Final   10/30/2020 15.4 11.7 - 15.7 g/dL Final   03/09/2020 14.3 11.7 - 15.7 g/dL Final   02/12/2020 14.0 11.7 - 15.7 g/dL Final   10/03/2019 14.6 11.7 - 15.7 g/dL Final   09/24/2019 15.7 11.7 - 15.7 g/dL Final       Imaging:  NA

## 2024-08-27 ASSESSMENT — PATIENT HEALTH QUESTIONNAIRE - PHQ9
SUM OF ALL RESPONSES TO PHQ QUESTIONS 1-9: 10
SUM OF ALL RESPONSES TO PHQ QUESTIONS 1-9: 10
10. IF YOU CHECKED OFF ANY PROBLEMS, HOW DIFFICULT HAVE THESE PROBLEMS MADE IT FOR YOU TO DO YOUR WORK, TAKE CARE OF THINGS AT HOME, OR GET ALONG WITH OTHER PEOPLE: VERY DIFFICULT

## 2024-08-28 ENCOUNTER — VIRTUAL VISIT (OUTPATIENT)
Dept: BEHAVIORAL HEALTH | Facility: CLINIC | Age: 69
End: 2024-08-28
Payer: MEDICARE

## 2024-08-28 DIAGNOSIS — F33.2 MDD (MAJOR DEPRESSIVE DISORDER), RECURRENT EPISODE, SEVERE (H): Primary | ICD-10-CM

## 2024-08-28 DIAGNOSIS — F41.1 GENERALIZED ANXIETY DISORDER: ICD-10-CM

## 2024-08-28 PROCEDURE — 99207 PR NO CHARGE LOS: CPT | Mod: 95 | Performed by: SOCIAL WORKER
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
## 2024-08-29 ENCOUNTER — TELEPHONE (OUTPATIENT)
Dept: CARDIOLOGY | Facility: CLINIC | Age: 69
End: 2024-08-29
Payer: MEDICARE

## 2024-08-29 NOTE — TELEPHONE ENCOUNTER
8/29/2024  @ 4:41 PM -  .Rcvd 8/29/24 @ 315 pm -  Hi Randi Mccann -  I have a new primary -  I want to make sure b/c the C is on the 25th and I would like this to - the preop that is showing up on my scheduling to do virtually, i need clarification whether or not I need to do a traditional preop at my primary's office.   PH:  [phone].  I'm calling now b/c my days are limited and the 25th is going to come up pretty quick.  I appreciate it.       Routed to  Nursing :  Miki Richardson, RN     Hanna HUNT, JON- [facility]  Cardiology/Pulmonary Hypertension

## 2024-08-30 ENCOUNTER — TELEPHONE (OUTPATIENT)
Dept: PSYCHIATRY | Facility: CLINIC | Age: 69
End: 2024-08-30

## 2024-08-30 ENCOUNTER — ANESTHESIA (OUTPATIENT)
Dept: BEHAVIORAL HEALTH | Facility: CLINIC | Age: 69
End: 2024-08-30

## 2024-08-30 ENCOUNTER — TELEPHONE (OUTPATIENT)
Dept: PSYCHIATRY | Facility: CLINIC | Age: 69
End: 2024-08-30
Payer: MEDICARE

## 2024-08-30 ENCOUNTER — ANESTHESIA EVENT (OUTPATIENT)
Dept: BEHAVIORAL HEALTH | Facility: CLINIC | Age: 69
End: 2024-08-30
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
## 2024-09-03 NOTE — TELEPHONE ENCOUNTER
[facility] Call Center    Phone Message    May a detailed message be left on voicemail: yes     Reason for Call: Other: Pt needs guidance about her sleep concerns. She needs a call back before the end of the day.      Action Taken: Other: [place] [employer] pool    Travel Screening: Not Applicable     Date of Service:

## 2024-09-04 ENCOUNTER — ANESTHESIA EVENT (OUTPATIENT)
Dept: BEHAVIORAL HEALTH | Facility: CLINIC | Age: 69
End: 2024-09-04
Payer: MEDICARE

## 2024-09-04 ENCOUNTER — ANESTHESIA (OUTPATIENT)
Dept: BEHAVIORAL HEALTH | Facility: CLINIC | Age: 69
End: 2024-09-04
Payer: MEDICARE

## 2024-09-04 ENCOUNTER — HOSPITAL ENCOUNTER (OUTPATIENT)
Dept: BEHAVIORAL HEALTH | Facility: CLINIC | Age: 69
Discharge: HOME | End: 2024-09-04
Attending: PSYCHIATRY & NEUROLOGY
Payer: MEDICARE

## 2024-09-04 VITALS
TEMPERATURE: 97.9 F | HEART RATE: 103 BPM | OXYGEN SATURATION: 94 % | DIASTOLIC BLOOD PRESSURE: 79 MMHG | RESPIRATION RATE: 20 BRPM | SYSTOLIC BLOOD PRESSURE: 145 MMHG

## 2024-09-04 DIAGNOSIS — F33.2 SEVERE RECURRENT MAJOR DEPRESSION WITHOUT PSYCHOTIC FEATURES (H): Primary | ICD-10-CM

## 2024-09-04 PROCEDURE — 370N000017 HC ANESTHESIA TECHNICAL FEE, PER MIN: Performed by: PSYCHIATRY & NEUROLOGY

## 2024-09-04 PROCEDURE — 90870 ELECTROCONVULSIVE THERAPY: CPT

## 2024-09-04 PROCEDURE — 250N000011 HC RX IP 250 OP 636: Performed by: PSYCHIATRY & NEUROLOGY

## 2024-09-04 PROCEDURE — 90870 ELECTROCONVULSIVE THERAPY: CPT | Performed by: PSYCHIATRY & NEUROLOGY

## 2024-09-04 PROCEDURE — 99100 ANES PT EXTEME AGE<1 YR&>70: CPT | Performed by: NURSE ANESTHETIST, CERTIFIED REGISTERED

## 2024-09-04 PROCEDURE — 250N000009 HC RX 250: Performed by: ANESTHESIOLOGY

## 2024-09-04 PROCEDURE — 250N000011 HC RX IP 250 OP 636: Performed by: ANESTHESIOLOGY

## 2024-09-04 PROCEDURE — 90870 ELECTROCONVULSIVE THERAPY: CPT | Performed by: NURSE ANESTHETIST, CERTIFIED REGISTERED

## 2024-09-04 RX ORDER — FENTANYL CITRATE 50 UG/ML
25 INJECTION, SOLUTION INTRAMUSCULAR; INTRAVENOUS EVERY 5 MIN PRN
Status: DISCONTINUED | OUTPATIENT
Start: 2024-09-04 | End: 2024-09-05 | Stop reason: HOSPADM

## 2024-09-04 RX ORDER — HYDROMORPHONE HCL IN WATER/PF 6 MG/30 ML
0.4 PATIENT CONTROLLED ANALGESIA SYRINGE INTRAVENOUS EVERY 5 MIN PRN
Status: DISCONTINUED | OUTPATIENT
Start: 2024-09-04 | End: 2024-09-05 | Stop reason: HOSPADM

## 2024-09-04 RX ORDER — HYDROMORPHONE HCL IN WATER/PF 6 MG/30 ML
0.2 PATIENT CONTROLLED ANALGESIA SYRINGE INTRAVENOUS EVERY 5 MIN PRN
Status: DISCONTINUED | OUTPATIENT
Start: 2024-09-04 | End: 2024-09-05 | Stop reason: HOSPADM

## 2024-09-04 RX ORDER — HYDROMORPHONE HCL IN WATER/PF 6 MG/30 ML
0.4 PATIENT CONTROLLED ANALGESIA SYRINGE INTRAVENOUS EVERY 5 MIN PRN
Status: DISCONTINUED | OUTPATIENT
Start: 2024-09-04 | End: 2024-09-04

## 2024-09-04 RX ORDER — FENTANYL CITRATE 50 UG/ML
50 INJECTION, SOLUTION INTRAMUSCULAR; INTRAVENOUS EVERY 5 MIN PRN
Status: DISCONTINUED | OUTPATIENT
Start: 2024-09-04 | End: 2024-09-05 | Stop reason: HOSPADM

## 2024-09-04 RX ORDER — SODIUM CHLORIDE, SODIUM LACTATE, POTASSIUM CHLORIDE, CALCIUM CHLORIDE 600; 310; 30; 20 MG/100ML; MG/100ML; MG/100ML; MG/100ML
INJECTION, SOLUTION INTRAVENOUS CONTINUOUS
Status: DISCONTINUED | OUTPATIENT
Start: 2024-09-04 | End: 2024-09-05 | Stop reason: HOSPADM

## 2024-09-04 RX ORDER — NALOXONE HYDROCHLORIDE 0.4 MG/ML
0.1 INJECTION, SOLUTION INTRAMUSCULAR; INTRAVENOUS; SUBCUTANEOUS
Status: DISCONTINUED | OUTPATIENT
Start: 2024-09-04 | End: 2024-09-05 | Stop reason: HOSPADM

## 2024-09-04 RX ORDER — ONDANSETRON 2 MG/ML
4 INJECTION INTRAMUSCULAR; INTRAVENOUS EVERY 30 MIN PRN
Status: DISCONTINUED | OUTPATIENT
Start: 2024-09-04 | End: 2024-09-05 | Stop reason: HOSPADM

## 2024-09-04 RX ORDER — DEXAMETHASONE SODIUM PHOSPHATE 4 MG/ML
4 INJECTION, SOLUTION INTRA-ARTICULAR; INTRALESIONAL; INTRAMUSCULAR; INTRAVENOUS; SOFT TISSUE
Status: DISCONTINUED | OUTPATIENT
Start: 2024-09-04 | End: 2024-09-05 | Stop reason: HOSPADM

## 2024-09-04 RX ORDER — KETOROLAC TROMETHAMINE 30 MG/ML
30 INJECTION, SOLUTION INTRAMUSCULAR; INTRAVENOUS ONCE
Status: CANCELLED
Start: 2024-09-04 | End: 2024-09-04

## 2024-09-04 RX ORDER — ONDANSETRON 4 MG/1
4 TABLET, ORALLY DISINTEGRATING ORAL EVERY 30 MIN PRN
Status: DISCONTINUED | OUTPATIENT
Start: 2024-09-04 | End: 2024-09-05 | Stop reason: HOSPADM

## 2024-09-04 RX ORDER — METHOHEXITAL IN WATER/PF 100MG/10ML
SYRINGE (ML) INTRAVENOUS PRN
Status: DISCONTINUED | OUTPATIENT
Start: 2024-09-04 | End: 2024-09-04

## 2024-09-04 RX ORDER — SODIUM CHLORIDE, SODIUM LACTATE, POTASSIUM CHLORIDE, CALCIUM CHLORIDE 600; 310; 30; 20 MG/100ML; MG/100ML; MG/100ML; MG/100ML
INJECTION, SOLUTION INTRAVENOUS CONTINUOUS
Status: DISCONTINUED | OUTPATIENT
Start: 2024-09-04 | End: 2024-09-04

## 2024-09-04 RX ORDER — KETOROLAC TROMETHAMINE 30 MG/ML
30 INJECTION, SOLUTION INTRAMUSCULAR; INTRAVENOUS ONCE
Status: COMPLETED | OUTPATIENT
Start: 2024-09-04 | End: 2024-09-04

## 2024-09-04 RX ORDER — HYDROMORPHONE HCL IN WATER/PF 6 MG/30 ML
0.2 PATIENT CONTROLLED ANALGESIA SYRINGE INTRAVENOUS EVERY 5 MIN PRN
Status: DISCONTINUED | OUTPATIENT
Start: 2024-09-04 | End: 2024-09-04

## 2024-09-04 RX ADMIN — KETOROLAC TROMETHAMINE 30 MG: 30 INJECTION, SOLUTION INTRAMUSCULAR at 11:31

## 2024-09-04 RX ADMIN — Medication 100 MG: at 11:50

## 2024-09-04 RX ADMIN — SUCCINYLCHOLINE CHLORIDE 90 MG: 20 INJECTION, SOLUTION INTRAMUSCULAR; INTRAVENOUS; PARENTERAL at 11:50

## 2024-09-04 NOTE — TELEPHONE ENCOUNTER
Huddled with lead, PSG required since she did not meet compliance.     Detailed message left for patient letting her know she needs in lab study to re qualify for CPAP. Order in chart. Provided phone number for scheduling.    Eli SIMENTAL RN  [facility]

## 2024-09-04 NOTE — PROCEDURES
"Procedures  [facility], [place]   ECT Procedure Note   09/04/2024    Randi Cleary is a 68 year old female patient.  [id number]    Patient Status: outpatient    Is this the first in a series of 12 treatments?  No      Allergies   Allergen Reactions    Serotonin Reuptake Inhibitors (Ssris) Anxiety, Difficulty breathing, Headache, Palpitations and Shortness Of Breath    Buspirone      The patient states she had serotonin syndrome    Cephalexin      Other reaction(s): unknown rxn.    Desvenlafaxine      Serotonin syndrome    Diclofenac Sodium [Diclofenac]      Serotonin syndrome and restless legs syndrome    Gabapentin      Drove on the wrong side of the highway    Levofloxacin      \"CAN'T REMEMBER\"    Penicillins      \"SORES IN MOUTH\"    Riluzole Difficulty breathing and Swelling    Sulfa Antibiotics      \"PT DOES NOT KNOW WHAT THE REACTION WAS\"    Topiramate Other (See Comments)     Frequent urination       Weight:  0 lbs 0 oz / 0 kg          Indications for ECT:   Medications ineffective and Psychotherapies ineffective         Clinical Narrative:   HPI - The patient describes a lifelong history of depression dating back to elementary school, worsening after her father's death when she was 13yo and especially in the 1980s in the setting of worsening physical health (since falling and breaking her ankle), which led to the the initiation of fluoxetine, her first antidepressant.  Her history has been primarily characterized by low mood, with some ups and downs but no extended depression-free periods since then.  She has tried many different medications from different classes, but cannot recall any one being particularly helpful for her.  She has experienced past episodes of particularly irritable mood and concomitant decreased sleep need, although most of these have lasted 1-2 days and triggered by anger related to external stressors.  She has at least one episode of a more extended " "episode of elevated mood (and associated grandiosity, increased spending, flight of ideas, increased goal directed behaviors, pressured speech, and odd and embarrassing behavior), which occurred in the context of relapse on alcohol in 2021. She does not endorse any events before about age 48.     The patient's depression is characterized by near daily low mood, frequent anhedonia, with sleep difficulties (although c/b pain, KYAW, and RLS), fatigue, feelings of guilt/worthlessness, and impaired concentration.  She reports feelings of \"despair,\" at times with active SI with plan, but denies any intent to act on this - \"maybe it's hope.\"  She has 1 lifetime suicide attempt (overdose) in the 1980s, for which she was psychiatrically hospitalized.  She has a remote history of SIB (cutting) but not recently.       Psych pertinent item history includes includes suicide attempt , suicidal ideation, SIB , aggression, trauma hx, substance use: alcohol, cannabis, and Patient has a history of alcohol dependence treated 20+ years ago and she relapsed in 2020 for a couple of months, in her 20s she\" loved getting high\" on cocaine, LSD, mushrooms, speed, white cross, mutiple psychotropic trials , psych hosp, ketamine, and major medical problems.    Target Symptoms for Improvement: Amotivation, Fatigue, Improved self-image and Panic attacks         Diagnosis:   Major depression         Assessment:   #1 05/24/24 Some circumstantial thought, needs some redirection, 3.5 hours sleep last night, anxious, mood 1/10, PDW but no SI, never had ECT before - only TMS. No AVH.   #2 05/29/24  Mood 4/10, better over w/e, some word find difficulties, no AVH/SI/PDW. Chronic sciatica pain, neck and shoulder pain - didn't worsen with ECT. Mild headache.   #3 05/31/24  Mood 4/10, No SI, PDW, AVH, some wrist pain and headache, memory might be improving.    #4 6/3/24  Phq-9= 13, mood 3/10.  Yesterday was very tearful, still a bit today.  Last treatment " "had awareness under paralysis.  Otherwise, some initial confusion after first ECT but no subsequent cognitive effects.  Signed consent to continue.  #5 6/5/24 Mood 4-5/10  She feels like her \"rage\" is less and she feels lighter. but no cognitive side effects. She complains of excessive sweating and is concerned her thryoid is unbalanced. She is somatically focused She reports pain on the side of her neck radiating down to her chest. She had a migraine she thinks over the weekend which usual starts as occipital tension.  #6 6/7/24 mood 4-5/10. No SI. She does have some baseline long term memory difficulties no AVH.   #7 6/10/24  She still is worried that She is not able to go home. She had visitors this weekend who said \"you don't seem to have the weight of the world on your shoulders anymore\" she disagrees she had a downward spiral over the weekend when there was a mix up with her clothes and she usually feels tearful after ECT. She does not report anything feeling worse. She gets down on herself when she has an anxiety spiral and it was the 1st one she has had since admission.   #8 6/12/24 Winnie reported some tearfulness overnight. She is noticing a weight lifting mood 6/10 . Reports some difficulty with remembering names but believes this is at baseline.   #9 6/14/24 Mood 5/10 (10 best) She feels like she is improving each time . She still has occasional crying spells but she feels she bounces back quicker. She is still anxious about going home. No Si. Tolerating ECT  #10 06/17/24 mood 5/10 She does feellike she has gotten some improvement since starting Ect but still has times where she gets tearful and anxious \"goes down the rabit hole\" but not as often . She has had ketamine troches before with pain  management to no effect but wonders about ketamine infusions.  #11 06/19/24 Mood today is 5/10. Patient is wondering whether she could do a ketamine course (did have ketamine in the past), despite of being on " "opioids. Feels that ketamine can help with \"anger, tearing and anxiety.\" She complains to the anesthesiologist about recent urinary incontinence which needs to be considered when  using ketamine. She wants to try it for anger ,tearing and anxiety which is not a standard indication  #12 06/21/24 Mood 5/10, no PDW/SI, but some anxiety around pain this AM.  Patient is interested in maintenance ECT at her attending psychiatrist's recommendation to prevent the possibility of her mood dipping as low as it did previously that led to her hospitalization.  Discussed pros and cons of maintenance ECT, suggested alternative of maintenance medications/therapy and/or watchful waiting with possibility of retreatment should she relapse, as well as alternate interventions including ketamine.  At the moment, she is most interested in pursuing maintenance ECT - will plan for next Friday pending confirmation with her family that she has post-ECT ride and monitoring.  M1 06/28/24 Mood ups and downs, irritability, anxiety, sadness switching multiple times a day since being discharged home.  Had difficulty with the transition home, was harder than she thought it would be.  However, still able to \"push away\" PDW/SI, and did not have periods of persistently low mood this past week.  Has noticed some anterograde and retrograde amnesia of the 1-2 months prior to starting ECT.  Will continue to track.  Today, mood 6/10 \"now that I'm at ECT.\"  M2 07/05/24 She was tearful, states that she doesn't feel good, \"starting to drop\", states that the mood change happened in Wednesday somewhat suddenly, when she encountered several business stressors and felt overwhelmed. Mood 3-4/10. Denies SI and feels safe at home. Still reports memory issues. Reports that the day program has been overwhelming.    She cancelled her colonoscopy in order to focus on her right heart cath the next week. She feels like she has too many procedures scheduled and pushed off " her sleep study also.  M3 7/12/24 Doing well.  Somewhat stressed witht all the medical appointments she has to coordinate. Her colonoscopy got cancelled because of her upcoming appointment with cardiology. Canceled the outpatient program but interested in doing the group therapy at [facility].   M4 7/19/24 Doing well. Less frustrated and started therapy. Reports short term memory impairment. That said, she is hesitant to space ECT to u2hlock  M5 8/2/24 Mood 5-6/10 she struggles some with the interval felling more irritable and tearful the second week. She felt some Si yesterday because she was frustrated and overehwlemd but no SI today. Cogntiively processing speed is affected and does better if she can get a hint. Encouraged her to take her viibryd as prescribed  M6 08/16/24  She is having headaches she attributes to the viibryd and encouraged her to adhere. She reports she still has lability between session lots of stressors with medical billing errors and feeling like she is hounded by collections mood 4/10. Tolerating Ect without complaints of cognitive side effects. Spent birthday in still water   M7 09/04/24  called earlier today with plan to cancel because she was feeling overwhelmed and had poor sleep the night before. Encouraged her to attend . She was very stressed because her electricity was out for mo than a week and her internet is out now . She still gets many incorrect medical bills which are very stressful  some trouble with naming songs on the radio         Pause for the Cause:     Correct patient Yes   Correct procedure/laterality settings: Yes           Intra-Procedure Documentation:     ECT #: 19   Treatment number this series: M7   Total treatment number: 19     Type of ECT:  Right, unilateral ultrabrief    ECT Medications:    Toradol 30mg iv - for headache/myalgia     Brevital: 100 mg (incr from 90 mg as still awake)   Succinyl Choline: 90 mg    BP - nicardipine 1500 mcg     ECT Strip Summary: RUL  Titration #3: 38.4 mC   Energy Level:  384.0mC, 0.3 ms, 100 Hz, 8 sec, 800 mA     Motor Seizure Duration: 19  seconds  EEG Seizure Duration:34 seconds      Time for re-orientation:     Complications: none    Plan:   - Plan for maintenance [id number]. Next 9/11/24 then 9/25  - Monitor depression severity with clinical assessment augmented with PHQ9 every other treatment  - Continue current medications    Discharge instructions:    -- Start your trazodone to support your recovery and ease insomnia   -- Remember to NOT take gabapentin after 6pm the night before each treatment  -- You will have to check to make sure somebody can drive you to and from the hospital and monitor you for 6 hours after each treatment  -- Maintenance ECT  every two weeks, then to once every month.  The goal of maintenance ECT is to space out and eventually stop  -- During maintenance, you can't drive for 24 hours after each treatment.    - We discussed other alternatives including trying ketamine through the [facility] or staying on your regular medications and therapy, but at the moment you were most interested in pursuing maintenance    - Per Dr Varela:   - Consider trial of serotonin modulator (e.g., vilazodone vs. vortioxetine) or novel agent Auvelity  - Consider augmentation with atypical antipsychotic (e.g., quetiapine or aripiprazole), though there are concerns given pre-diabetes        Toan Cotter MD  Department of Psychiatry and Behavioral Sciences

## 2024-09-04 NOTE — ANESTHESIA CARE TRANSFER NOTE
Patient: Randi Cleary    Procedure: * No procedures listed *  Electroconvulsive Therapy    Diagnosis: * No pre-op diagnosis entered *  Diagnosis Additional Information: No value filed.    Anesthesia Type:   General     Note:    Oropharynx: spontaneously breathing  Level of Consciousness: drowsy  Oxygen Supplementation: room air    Independent Airway: airway patency satisfactory and stable  Dentition: dentition unchanged  Vital Signs Stable: post-procedure vital signs reviewed and stable  Report to RN Given: handoff report given  Patient transferred to: PACU    Handoff Report: Identifed the Patient, Identified the Reponsible Provider, Reviewed the pertinent medical history, Discussed the surgical course, Reviewed Intra-OP anesthesia mangement and issues during anesthesia, Set expectations for post-procedure period and Allowed opportunity for questions and acknowledgement of understanding      Vitals:  Vitals Value Taken Time   /90 09/04/24 1200   Temp 36.3  C (97.4  F) 09/04/24 1200   Pulse 118 09/04/24 1200   Resp 21 09/04/24 1200   SpO2 94 % 09/04/24 1200       Electronically Signed By: Carmen Saldana MD  September 4, 2024  12:10 PM

## 2024-09-04 NOTE — ANESTHESIA PREPROCEDURE EVALUATION
Anesthesia Pre-Procedure Evaluation    Patient: Randi Cleary   MRN: [id number] : 1955        Procedure : * No procedures listed *  Electroconvulsive Therapy       Past Medical History:   Diagnosis Date    Bipolar 2 disorder (H)     Breast cancer (H)     lumpectomy, radiation, chemo    Chronic pain syndrome     COPD (chronic obstructive pulmonary disease) (H)     asthma    Cord compression (H) 2021    Dizzy     Drug tolerance     opioid    Esophageal reflux     Fatigue     Generalized anxiety disorder     Graves disease     Hemochromatosis 2018    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; H63D not detected    History of breast cancer 2020    Formatting of this note might be different from the original. Created by Conversion  Replacement Utility updated for latest IMO load Formatting of this note might be different from the original. Created by Conversion  Replacement Utility updated for latest IMO load    History of corticosteroid therapy 2019    History of partial adrenalectomy (H24) 2019    History of pheochromocytoma 2019    Hx antineoplastic chemotherapy     Hx of radiation therapy     Hyperlipidaemia     Hypertension     Impaired fasting glucose     Injury of neck, whiplash 07/15/2021    Joint pain     KYAW (obstructive sleep apnea) 2016    Osteopenia     Pheochromocytoma, left 2017    laparoscopically removed    Postablative hypothyroidism     Prediabetes 10/03/2019    by A1c    Psoriasis     Psoriatic arthropathy (H)     Pulmonary hypertension (H)     Right rotator cuff tear     RLS (restless legs syndrome)     on ropinorole    Sacroiliitis (H24)     Serotonin syndrome 2020    [facility] - While on desvenlafaxine 100mg    Snoring     Spinal stenosis     Status post coronary angiogram 10/03/2019    Urinary incontinence     Vitamin B 12 deficiency 2009    Vitamin D deficiency 2010      Past Surgical History:   Procedure Laterality Date    ARTHRODESIS  ANKLE      ARTHROPLASTY ANKLE Right 6/29/2015    Procedure: ARTHROPLASTY ANKLE;  Surgeon: Jason Coughlin MD;  Location: [facility]    ARTHROPLASTY REVISION ANKLE Right 6/29/2015    Procedure: ARTHROPLASTY REVISION ANKLE;  Surgeon: Jason Coughlin MD;  Location: [facility]    BIOPSY BREAST      BREAST BIOPSY, CORE RT/LT      COLONOSCOPY      COLONOSCOPY N/A 2/25/2021    Procedure: COLONOSCOPY;  Surgeon: Guru Elke Tolbert MD;  Location: [facility]    CV CORONARY ANGIOGRAM N/A 10/3/2019    Procedure: CV CORONARY ANGIOGRAM;  Surgeon: Bryce Pierre MD;  Location:  HEART CARDIAC CATH LAB    CV RIGHT HEART CATH MEASUREMENTS RECORDED N/A 10/3/2019    Procedure: CV RIGHT HEART CATH;  Surgeon: Bryce Pierre MD;  Location:  HEART CARDIAC CATH LAB    ESOPHAGOSCOPY, GASTROSCOPY, DUODENOSCOPY (EGD), COMBINED N/A 2/25/2021    Procedure: ESOPHAGOGASTRODUODENOSCOPY, WITH BIOPSY;  Surgeon: Guru Elke Tolbert MD;  Location:  GI    EYE SURGERY  2021    HC REMOVE TONSILS/ADENOIDS,<11 Y/O      Description: Tonsillectomy With Adenoidectomy;  Recorded: 04/07/2010;    IR LUMBAR EPIDURAL STEROID INJECTION  10/26/2004    IR LUMBAR EPIDURAL STEROID INJECTION  11/16/2004    IR LUMBAR EPIDURAL STEROID INJECTION  12/21/2004    IR LUMBAR EPIDURAL STEROID INJECTION  6/8/2006    JOINT REPLACEMENT      LAMINOPLASTY CERVICAL POSTERIOR THREE+ LEVELS Left 12/21/2021    Procedure: CERVICAL 3-CERVICAL 6 LEFT OPEN DOOR LAMINOPLASTY AND LEFT CERVICAL 4-5 AND CERVICAL 6-7 POSTERIOR FORAMINOTOMY;  Surgeon: Angela Gregory MD;  Location: [facility] OR    LAPAROSCOPIC ADRENALECTOMY Left 08/02/2017    pheochromocytoma    LAPAROSCOPIC ADRENALECTOMY Left 8/2/2017    Procedure: LAPAROSCOPIC LEFT ADRENALECTOMY, ;  Surgeon: Gab Linares MD;  Location: [facility] OR;  Service:     LENGTHEN TENDON ACHILLES Right 6/29/2015    Procedure: LENGTHEN TENDON ACHILLES;  Surgeon: Jason Coughlin MD;  " Location: [facility]    LUMPECTOMY BREAST      LUMPECTOMY BREAST Left 1994    MAMMOPLASTY REDUCTION Right 2015    Todd    MAMMOPLASTY REDUCTION Right     approx late     MASTECTOMY      left lumpectomy with axillary node dissection    MASTECTOMY MODIFIED RADICAL      OTHER SURGICAL HISTORY Right     reconstructive breast surgery    OTHER SURGICAL HISTORY      Adrenalectomy for pheochromocytoma    NH MASTECTOMY, MODIFIED RADICAL      Description: Modified Radical Mastectomy Left Breast;  Recorded: 2010;    REPAIR HAMMER TOE Right 2015    Procedure: REPAIR HAMMER TOE;  Surgeon: Jason Coughlin MD;  Location: [facility]    TONSILLECTOMY      TONSILLECTOMY & ADENOIDECTOMY      C ARTHRODESIS,ANKLE,OPEN Right     Description: Ankle Arthrodesis;  Recorded: 2010;      Allergies   Allergen Reactions    Serotonin Reuptake Inhibitors (Ssris) Anxiety, Difficulty breathing, Headache, Palpitations and Shortness Of Breath    Buspirone      The patient states she had serotonin syndrome    Cephalexin      Other reaction(s): unknown rxn.    Desvenlafaxine      Serotonin syndrome    Diclofenac Sodium [Diclofenac]      Serotonin syndrome and restless legs syndrome    Gabapentin      Drove on the wrong side of the highway    Levofloxacin      \"CAN'T REMEMBER\"    Penicillins      \"SORES IN MOUTH\"    Riluzole Difficulty breathing and Swelling    Sulfa Antibiotics      \"PT DOES NOT KNOW WHAT THE REACTION WAS\"    Topiramate Other (See Comments)     Frequent urination      Social History     Tobacco Use    Smoking status: Former     Current packs/day: 0.00     Average packs/day: 2.5 packs/day for 29.2 years (72.9 ttl pk-yrs)     Types: Cigarettes     Start date: 1971     Quit date: 2000     Years since quittin.2     Passive exposure: Past    Smokeless tobacco: Never   Substance Use Topics    Alcohol use: Not Currently     Comment: relapse 2021 sober       Wt Readings from Last 1 Encounters: "   08/22/24 79.2 kg (174 lb 9.6 oz)        Anesthesia Evaluation            ROS/MED HX  ENT/Pulmonary:       Neurologic:       Cardiovascular:     (+)  - -   -  - -                                pulmonary hypertension,      METS/Exercise Tolerance:     Hematologic:       Musculoskeletal:       GI/Hepatic:       Renal/Genitourinary:       Endo:     (+)  type II DM,        thyroid problem,     Obesity,       Psychiatric/Substance Use:       Infectious Disease:       Malignancy:       Other:            Physical Exam    Airway        Mallampati: II   TM distance: > 3 FB   Neck ROM: full   Mouth opening: > 3 cm    Respiratory Devices and Support         Dental       (+) Multiple crowns, permanant bridges      Cardiovascular   cardiovascular exam normal          Pulmonary   pulmonary exam normal                OUTSIDE LABS:  CBC:   Lab Results   Component Value Date    WBC 6.8 08/22/2024    WBC 6.9 08/06/2024    HGB 16.9 (H) 08/22/2024    HGB 17.7 (H) 08/06/2024    HCT 49.3 (H) 08/22/2024    HCT 49.3 (H) 08/06/2024     08/22/2024     08/06/2024     BMP:   Lab Results   Component Value Date     08/22/2024     08/06/2024    POTASSIUM 4.3 08/22/2024    POTASSIUM 3.9 08/06/2024    CHLORIDE 106 08/22/2024    CHLORIDE 106 08/06/2024    CO2 27 08/22/2024    CO2 22 08/06/2024    BUN 11.4 08/22/2024    BUN 10.7 08/06/2024    CR 0.57 08/22/2024    CR 0.51 08/06/2024    GLC 94 08/22/2024     (H) 08/06/2024     COAGS:   Lab Results   Component Value Date    PTT 34 12/13/2021    INR 0.94 12/13/2021     POC:   Lab Results   Component Value Date     (H) 02/25/2021     HEPATIC:   Lab Results   Component Value Date    ALBUMIN 4.0 08/22/2024    PROTTOTAL 6.8 08/22/2024    ALT 14 08/22/2024    AST 22 08/22/2024    ALKPHOS 72 08/22/2024    BILITOTAL 0.5 08/22/2024     OTHER:   Lab Results   Component Value Date    A1C 5.7 (H) 08/21/2024    LINDA 9.3 08/22/2024    MAG 1.9 05/22/2024    TSH 1.69 06/04/2024  "   T4 1.49 03/29/2024    T3 114 01/18/2023    CRP <2.9 11/16/2021    SED 8 10/17/2023       Anesthesia Plan    ASA Status:  3       Anesthesia Type: General.   Induction: Intravenous.           Consents            Postoperative Care            Comments:               Carmen Saldana MD    I have reviewed the pertinent notes and labs in the chart from the past 30 days and (re)examined the patient.  Any updates or changes from those notes are reflected in this note.              # Overweight: Estimated body mass index is 28.18 kg/m  as calculated from the following:    Height as of 8/21/24: 1.676 m (5' 6\").    Weight as of 8/22/24: 79.2 kg (174 lb 9.6 oz).      "

## 2024-09-04 NOTE — DISCHARGE INSTRUCTIONS
ECT Discharge Instructions      During your ECT series:    Do not drive or work heavy equipment until 7 days after your last treatment.  Do not drink alcohol or use street drugs (illicit drugs) while you are having treatments.  Do not make important decisions, including legal decisions.    After each treatment:    Get plenty of rest. A responsible adult must stay with your for at least 6 hours.  Avoid heavy or risky activities for 24 hours while in maintenance ECT.   Do not drive for at least 24 hours after your treatment while in maintenance ECT.   If you have more than one treatment within one week, do not drive for 7 days after your last treatment.   If you feel light-headed, sit for a few minutes before standing. Have someone help you get up.  If you have nausea (feel sick to your stomach): Drink only clear liquids such as apple juice, ginger ale, broth or 7UP, Be sure to drink plenty of liquids. Move to a normal diet as you feel able.   If you received Toradol, wait 6 hours before taking ibuprofen.  Call your doctor if:   You have a fever over 100F (37.7 C) (taken under the tongue), or a fever that last more than 24 hours.  Your IV site is red, swollen, very painful or is getting more tender.  You have nausea that gets very bad or does not improve.    If you have any symptoms after ECT, tell our staff before your next treatment.  The ECT Department can be reached at [phone].  The ECT Department is open Mondays, Wednesdays and Fridays from 7:00 AM to 2:00 PM.    To speak to a doctor, call:  Your primary care provider or [facility] for Dr. Beavers, Dr. Hutchison or Dr. Cotter PHONE: [phone]; fax: [phone]    New instructions:  Plan:   - Plan for maintenance [id number]. Next 9/11/24 then 9/25  - Monitor depression severity with clinical assessment augmented with PHQ9 every other treatment  - Continue current medications     Discharge instructions:               -- Start your trazodone to support  your recovery and ease insomnia              -- Remember to NOT take gabapentin after 6pm the night before each treatment  -- You will have to check to make sure somebody can drive you to and from the hospital and monitor you for 6 hours after each treatment  -- Maintenance ECT  every two weeks, then to once every month.  The goal of maintenance ECT is to space out and eventually stop  -- During maintenance, you can't drive for 24 hours after each treatment.  You have received Toradol today at 1130. Toradol is an NSAID.  Do not take any NSAID's including: Ibuprofen, Naproxen Sodium, Asprin, Advil, Motrin, Aleve, Shannan, etc., until six hours after the above time which would be 530pm. If you have any questions check back with your Physician, or pharmacist.     Covid-19 Testing:  We are no longer requiring covid tests prior to your procedure. If you are ill, please call the ECT department to discuss plan for rescheduling your appointment. [phone]    Please come to the [facility] and check-in with Security before your ECT appointment.  The [facility] is located right next to the Adult Emergency Room.  The address is [address] [place] [address].    After your appointment, you may be picked up at the [facility] entrance, which is located at [address] [place] [address].  [facility], about 1 block North of the [facility].    Transported by:   Aleida      Reviewed AVS discharge instructions with:   Aleida

## 2024-09-04 NOTE — ANESTHESIA POSTPROCEDURE EVALUATION
Patient: Randi Cleary    Procedure: * No procedures listed *  Electroconvulsive Therapy    Anesthesia Type:  General    Note:  Disposition: Outpatient   Postop Pain Control: Uneventful            Sign Out: Well controlled pain   PONV: No   Neuro/Psych: Uneventful            Sign Out: Acceptable/Baseline neuro status   Airway/Respiratory: Uneventful            Sign Out: Acceptable/Baseline resp. status   CV/Hemodynamics: Uneventful            Sign Out: Acceptable CV status; No obvious hypovolemia; No obvious fluid overload   Other NRE:    DID A NON-ROUTINE EVENT OCCUR?            Last vitals:  Vitals:    09/04/24 1114 09/04/24 1200   BP: 99/67 (!) 164/90   Pulse: 59 118   Resp: 16 21   Temp: 36.2  C (97.1  F) 36.3  C (97.4  F)   SpO2: 95% 94%       Electronically Signed By: Carmen Saldana MD  September 4, 2024  12:10 PM

## 2024-09-06 ENCOUNTER — VIRTUAL VISIT (OUTPATIENT)
Dept: PSYCHOLOGY | Facility: CLINIC | Age: 69
End: 2024-09-06
Payer: MEDICARE

## 2024-09-06 DIAGNOSIS — F33.2 MAJOR DEPRESSIVE DISORDER, RECURRENT SEVERE WITHOUT PSYCHOTIC FEATURES (H): ICD-10-CM

## 2024-09-06 DIAGNOSIS — F41.1 GENERALIZED ANXIETY DISORDER: Primary | ICD-10-CM

## 2024-09-06 PROCEDURE — 90834 PSYTX W PT 45 MINUTES: CPT | Mod: 95 | Performed by: MARRIAGE & FAMILY THERAPIST

## 2024-09-06 NOTE — PROGRESS NOTES
[facility]                                     Progress Note    Patient Name: Randi Cleary  Date: 2024       Service Type: Individual      Session Start Time: 1:00  Session End Time: 1:50     Session Length: 38-52    Session #: 2    Attendees: Client    Service Modality:  Video Visit:      Provider verified identity through the following two step process.  Patient provided:  Patient  and Patient address    Telemedicine Visit: The patient's condition can be safely assessed and treated via synchronous audio and visual telemedicine encounter.      Reason for Telemedicine Visit: Patient has requested telehealth visit    Originating Site (Patient Location): Patient's home    Distant Site (Provider Location): Provider Remote Setting- Home Office    Consent:  The patient/guardian has verbally consented to: the potential risks and benefits of telemedicine (video visit) versus in person care; bill my insurance or make self-payment for services provided; and responsibility for payment of non-covered services.     Patient would like the video invitation sent by:  My Chart    Mode of Communication:  Video Conference via [employer]    Distant Location (Provider):  Off-site    As the provider I attest to compliance with applicable laws and regulations related to telemedicine.    DATA  Interactive Complexity: No  Crisis: No        Progress Since Last Session (Related to Symptoms / Goals / Homework):   Symptoms: No change      Homework:  n/a      Episode of Care Goals: Minimal progress - PREPARATION (Decided to change - considering how); Intervened by negotiating a change plan and determining options / strategies for behavior change, identifying triggers, exploring social supports, and working towards setting a date to begin behavior change     Current / Ongoing Stressors and Concerns:   Continued overwhelmed feelings about keeping up with appointments and organizing bills.  Identifies feeling like a  larry dang in the wind sometimes.  Reflected on her previous areas of interest and jobs.  Identified a longstanding interest in art that she has not spent much time on lately.      Treatment Objective(s) Addressed in This Session:   Relationship building, assessment     Intervention:   Relationship building, assessment.  Identifying areas of interest for future work.  Distress tolerance.    Assessments completed prior to visit:  The following assessments were completed by patient for this visit:  PHQ9:       7/2/2024     8:23 AM 7/10/2024     9:24 AM 7/17/2024     9:52 AM 7/28/2024    11:39 AM 8/21/2024     7:35 AM 8/27/2024     2:58 PM 9/6/2024    11:53 AM   PHQ-9 SCORE   PHQ-9 Total Score MyChart 13 (Moderate depression) 13 (Moderate depression) 14 (Moderate depression) 12 (Moderate depression) 9 (Mild depression) 10 (Moderate depression) 10 (Moderate depression)   PHQ-9 Total Score 13 13    13    13 14 12 9 10 10     GAD7:       4/4/2024     8:52 AM 4/18/2024    12:34 PM 5/15/2024    11:25 AM 6/6/2024     8:00 AM 6/27/2024    12:48 PM 7/1/2024    12:10 PM 7/17/2024     9:55 AM   CHAVO-7 SCORE   Total Score 13 (moderate anxiety) 17 (severe anxiety) 9 (mild anxiety)  11 (moderate anxiety)  10 (moderate anxiety)   Total Score 13    13    13    13 17    17 9    9    9    9 9 11    11    11    11    11 11 10     CAGE-AID:       6/22/2020     7:12 PM 1/18/2022    11:15 PM 6/6/2024     8:00 AM   CAGE-AID Total Score   Total Score 4 4 4   Total Score MyChart 4 (A total score of 2 or greater is considered clinically significant) 4 (A total score of 2 or greater is considered clinically significant)      PROMIS 10-Global Health (all questions and answers displayed):       1/22/2024    12:00 PM 5/1/2024    11:53 AM 5/15/2024    11:27 AM 6/6/2024     8:00 AM 7/1/2024    12:10 PM 7/10/2024     9:53 AM 7/17/2024    10:01 AM   PROMIS 10   In general, would you say your health is: Fair Fair Fair   Fair Fair   In general, would  you say your quality of life is: Poor Poor Poor   Good Poor   In general, how would you rate your physical health? Fair Fair Fair   Fair Fair   In general, how would you rate your mental health, including your mood and your ability to think? Poor Fair Fair   Fair Fair   In general, how would you rate your satisfaction with your social activities and relationships? Poor Fair Poor   Poor Poor   In general, please rate how well you carry out your usual social activities and roles Fair Poor Poor   Poor Fair   To what extent are you able to carry out your everyday physical activities such as walking, climbing stairs, carrying groceries, or moving a chair? Moderately Moderately A little   Moderately A little   In the past 7 days, how often have you been bothered by emotional problems such as feeling anxious, depressed, or irritable? Often Often Sometimes   Sometimes Often   In the past 7 days, how would you rate your fatigue on average? Severe Severe Severe   Mild Moderate   In the past 7 days, how would you rate your pain on average, where 0 means no pain, and 10 means worst imaginable pain? 7 7 8   7 7   In general, would you say your health is: 2 2 2 2 3 2 2   In general, would you say your quality of life is: 1 1 1 2 2 3 1   In general, how would you rate your physical health? 2 2 2 2 2 2 2   In general, how would you rate your mental health, including your mood and your ability to think? 1 2 2 2 3 2 2   In general, how would you rate your satisfaction with your social activities and relationships? 1 2 1 1 1 1 1   In general, please rate how well you carry out your usual social activities and roles. (This includes activities at home, at work and in your community, and responsibilities as a parent, child, spouse, employee, friend, etc.) 2 1 1 1 1 1 2   To what extent are you able to carry out your everyday physical activities such as walking, climbing stairs, carrying groceries, or moving a chair? 3 3 2 2 3 3 2   In  "the past 7 days, how often have you been bothered by emotional problems such as feeling anxious, depressed, or irritable? 4 4 3 3 4 3 4   In the past 7 days, how would you rate your fatigue on average? 4 4 4 4 2 2 3   In the past 7 days, how would you rate your pain on average, where 0 means no pain, and 10 means worst imaginable pain? 7 7 8 8 7 7 7   Global Mental Health Score 5 7    7    7 7    7 8 8 9    9    9    9    9    9 6   Global Physical Health Score 9 9    9    9 8    8 8 11 11    11    11    11    11    11 9   PROMIS TOTAL - SUBSCORES 14 16    16    16 15    15 16 19 20    20    20    20    20    20 15     [place] Suicide Severity Rating Scale (Lifetime/Recent)      5/5/2020     9:21 AM 6/11/2020    10:00 AM 1/9/2023     1:00 PM 5/21/2024     4:49 PM 5/21/2024     9:00 PM 6/6/2024     8:00 AM 7/10/2024    12:00 PM   [place] Suicide Severity Rating (Lifetime/Recent)   Q1 Wish to be Dead (Lifetime) Yes Yes   Yes     Comments \"When I was going through a couple of  years of counseling from a dysfunctional family about 30 years ago or more\" when patient was in treatment and there were family concerns   OD on medications     Q2 Non-Specific Active Suicidal Thoughts (Lifetime) Yes Yes   No     Non-Specific Active Suicidal Thought Description (Lifetime) Had thoughts of killing self         Most Severe Ideation Rating (Lifetime) 5 5   5     Most Severe Ideation Description (Lifetime) 35 years ago \"I just wanted to check out , I had thought of it so much and wanted to be done\" when family problems were happening   OD on medication     Frequency (Lifetime) 4    3     Duration (Lifetime) 2    3     Controllability (Lifetime) 3 0   2     Protective Factors  (Lifetime) 2 5   4     Reasons for Ideation (Lifetime) 5    5     Q1 Wished to be Dead (Past Month)   yes 1-->yes 1-->yes 1-->yes    Q2 Suicidal Thoughts (Past Month)   no 1-->yes 1-->yes 1-->yes    Q3 Suicidal Thought Method   no 0-->no 0-->no 1-->yes    Q4 " Suicidal Intent without Specific Plan   no 1-->yes 0-->no 0-->no    Q5 Suicide Intent with Specific Plan   no 0-->no 0-->no 1-->yes    Q6 Suicide Behavior (Lifetime)   yes 1-->yes 0-->no 1-->yes    If yes to Q6, within past 3 months?   no 1-->yes 0-->no 0-->no    Level of Risk per Screen   moderate risk high risk moderate risk high risk    RETIRED: 1. Wish to be Dead (Recent) No No        RETIRED: 2. Non-Specific Active Suicidal Thoughts (Recent) No No        3. Active Suicidal Ideation with any Methods (Not Plan) Without Intent to Act (Lifetime) Yes Yes        RETIRE: Active Suicidal Ideation with any Methods (Not Plan) Description (Lifetime) Took many pills of Prozac and was sent to the ED 30 years prior        RETIRED: 3. Active Suicidal Ideation with any Methods (Not Plan) Without Intent to Act (Recent) No         RETIRE: 4. Active Suicidal Ideation with Some Intent to Act, Without Specific Plan (Lifetime) Yes Yes        RETIRE: Active Suicidal Ideation with Some Intent to Act, Without Specific Plan Description (Lifetime) Took many pills of Prozac and was sent to the ED         4. Active Suicidal Ideation with Some Intent to Act, Without Specific Plan (Recent) No         RETIRE: 5. Active Suicidal Ideation with Specific Plan and Intent (Lifetime) Yes Yes        RETIRE: Active Suicidal Ideation with Specific Plan and Intent Description (Lifetime) Took many pills of Prozac and was sent to the ED over 30 years ago         RETIRED: 5. Active Suicidal Ideation with Specific Plan and Intent (Recent) No         Most Severe Ideation Rating (Past Month) NA         Frequency (Past Month) NA         Duration (Past Month) NA         Controllability (Past Month) NA         Protective Factors (Past Month) NA         Reasons for Ideation (Past Month) NA         Actual Attempt (Lifetime) Yes Yes        Actual Attempt Description (Lifetime) Took a bunch of pills patient reported overdosing on Prozac about thirty years ago         Total Number of Actual Attempts (Lifetime) 1 1        Actual Attempt (Past 3 Months) No No        Has subject engaged in non-suicidal self-injurious behavior? (Lifetime) Yes Yes        Has subject engaged in non-suicidal self-injurious behavior? (Past 3 Months) No No        Interrupted Attempts (Lifetime) No No        Interrupted Attempts (Past 3 Months) No No        Aborted or Self-Interrupted Attempt (Lifetime) No No        Total Number Aborted or Self Interrupted Attempts (Lifetime) 0         Aborted or Self-Interrupted Attempt (Past 3 Months) No No        Preparatory Acts or Behavior (Lifetime) No No        Preparatory Acts or Behavior (Past 3 Months) No No        Most Recent Attempt Date 10/1/1984         Comments  30 years prior        Most Recent Attempt Actual Lethality Code 2 0        Comments This is a guess/pt. doesn't recall exact month or day         Most Lethal Attempt Actual Lethality Code 2         Comments only 1 attempt/same attempt as most recent & initial         Initial/First Attempt Date 10/1/1984         Initial/First Attempt Actual Lethality Code 2         Q1 Wish to be Dead (Lifetime)       N   Q2 Non-Specific Active Suicidal Thoughts (Lifetime)       N         ASSESSMENT: Current Emotional / Mental Status (status of significant symptoms):   Risk status (Self / Other harm or suicidal ideation)   Patient denies current fears or concerns for personal safety.   Patient reports the following current or recent suicidal ideation or behaviors: passive ideation.   Patient denies current or recent homicidal ideation or behaviors.   Patient denies current or recent self injurious behavior or ideation.   Patient denies other safety concerns.   Patient reports there has been no change in risk factors since their last session.     Patient reports there has been no change in protective factors since their last session.     A safety and risk management plan has been developed including: Patient consented  to co-developed safety plan on 6/4/24.  Safety and risk management plan was reviewed.   Patient agreed to use safety plan should any safety concerns arise.  A copy was made available to the patient.     Appearance:   Appropriate    Eye Contact:   Fair    Psychomotor Behavior: Normal    Attitude:   Cooperative  Pleasant   Orientation:   All   Speech    Rate / Production: Normal     Volume:  Normal    Mood:    Anxious    Affect:    Subdued    Thought Content:  Clear    Thought Form:  Coherent  Logical    Insight:    Good      Medication Review:   No changes to current psychiatric medication(s)     Medication Compliance:   Yes     Changes in Health Issues:   None reported     Chemical Use Review:   Substance Use: Chemical use reviewed, no active concerns identified      Tobacco Use: No current tobacco use.      Diagnosis:  1. Generalized anxiety disorder    2. Major depressive disorder, recurrent severe without psychotic features (H)          Collateral Reports Completed:   Not Applicable    PLAN: (Patient Tasks / Therapist Tasks / Other)  Restart some art/craft projects with hope of feeling more emotionally grounded.      Erika Colorado, [facility]                                                         ______________________________________________________________________    Individual Treatment Plan    Patient's Name: Randi Cleary  YOB: 1955    Date of Creation: 7/17/2024  Date Treatment Plan Last Reviewed/Revised: 7/17/2024    DSM5 Diagnoses: 296.33 (F33.2) Major Depressive Disorder, Recurrent Episode, Severe _ or 300.02 (F41.1) Generalized Anxiety Disorder  Psychosocial / Contextual Factors: history of trauma  PROMIS (reviewed every 90 days):     Referral / Collaboration:  Discussed and patient will pursue a therapy group for depressive symptoms.    Anticipated number of session for this episode of care: 9-12 sessions  Anticipation frequency of session: Weekly  Anticipated Duration of each  session: 38-52 minutes  Treatment plan will be reviewed in 90 days or when goals have been changed.       MeasurableTreatment Goal(s) related to diagnosis / functional impairment(s)  Goal 1: Client will keep self safe and eliminate suicidal ideation and self-harm behaviors.    Objective #A (Client Action)    Client will make a list of at least 10 skills or activities that you will to use to distract from urges to harm self.  Status: New - Date: 7/17/24      Intervention(s)  Therapist will provide ideas and strategies for generating coping skills list.    Objective #B  Client will make a list of pros and cons for tolerating and not tolerating an urge to harm self.  Status: New - Date: 7/17/24      Intervention(s)  Therapist will guide client through DBT-style Pros/Cons list for behavior change.    Objective #C  Client will identify and practice the new strategies for dealing with strong emotions, learn and practice relaxation breathing.  Status: New - Date: 7/17/24      Intervention(s)  Therapist will teach distraction skills. Practice them within session and outside of session.    Goal 2: Client will report reduction in anxiety also as evidenced by reduction of CHAVO-7 score below 6 points within the next 12 weeks.    Objective #A (Client Action)    Client will identify at least three triggers for anxiety.  Status: New - Date: 7/17/24      Intervention(s)  Therapist will provide educational materials on common triggers and signs of anxiety.    Objective #B  Client will use relaxation strategies at least two times per day to reduce the physical symptoms of anxiety.  Status: New - Date: 7/17/24      Intervention(s)  Therapist will teach relaxation strategies such as mindfulness, deep breathing, muscle relaxation, and sensory activities.    Objective #C  Client will use cognitive strategies identified in therapy to challenge anxious thoughts.  Status: New - Date: 7/17/24      Intervention(s)  Therapist will teach  strategies for cognitive modification using REBT model.    Goal 3: Client will report improved mood as indicated by PHQ-9 score below 6 for consistent 8 weeks.    Objective #A (Client Action)    Status: New - Date: 7/17/24     Client will Increase interest, engagement, and pleasure in doing things.    Intervention(s)  Therapist will assign homework for daily involvement in pleasant activities.    Objective #B  Client will Identify negative self-talk and behaviors: challenge core beliefs, myths, and actions.    Status: New - Date: 7/17/24      Intervention(s)  Therapist will teach strategies for cognitive modification using REBT models.    Objective #C  Client will Improve quantity and quality of night time sleep / decrease daytime naps.  Status: New - Date: 7/17/24      Intervention(s)  Therapist will teach sleep hygiene strategies.  Assign homework for daily practice.    Goal 4: Client will report reduced or eliminated physiological activation when recalling a distressing event including decreased re-experiencing, decreased avoidance, and decreased hyperarousal.    Objective #A (Client Action)    Status: New - Date: 7/17/24      Client will acquire knowledge of trauma, common symptoms post-trauma, emotion regulation strategies, stress management strategies, and cognitive coping strategies.    Intervention(s)  Therapist will teach about effects of trauma on mind and body; encourage emotion identification and expression; practice with client the stress management strategies; and teach cognitive modification.    Objective #B  Client will use Brainspotting while focusing on physiological sensations related to distressing events.  Client will assess and rate level of physiological activation.  Status: New - Date: 7/17/24      Intervention(s)  Therapist will provide use a pointer or other materials to assist client in holding a brainspot in their visual field.  Therapist might also provide biolateral music through  headphones to deepen the experience.         Patient has reviewed and agreed to the above plan.      Erika Colorado, LMFT  July 17, 2024

## 2024-09-09 ASSESSMENT — ANXIETY QUESTIONNAIRES
GAD7 TOTAL SCORE: 13
7. FEELING AFRAID AS IF SOMETHING AWFUL MIGHT HAPPEN: MORE THAN HALF THE DAYS
8. IF YOU CHECKED OFF ANY PROBLEMS, HOW DIFFICULT HAVE THESE MADE IT FOR YOU TO DO YOUR WORK, TAKE CARE OF THINGS AT HOME, OR GET ALONG WITH OTHER PEOPLE?: VERY DIFFICULT

## 2024-09-10 ENCOUNTER — OFFICE VISIT (OUTPATIENT)
Dept: PSYCHIATRY | Facility: CLINIC | Age: 69
End: 2024-09-10
Payer: MEDICARE

## 2024-09-10 DIAGNOSIS — F41.1 GENERALIZED ANXIETY DISORDER: ICD-10-CM

## 2024-09-10 DIAGNOSIS — F33.2 SEVERE EPISODE OF RECURRENT MAJOR DEPRESSIVE DISORDER, WITHOUT PSYCHOTIC FEATURES (H): Primary | ICD-10-CM

## 2024-09-10 RX ORDER — BISACODYL 5 MG/1
TABLET, DELAYED RELEASE ORAL
Qty: 4 TABLET | Refills: 0 | Status: SHIPPED | OUTPATIENT
Start: 2024-09-10

## 2024-09-10 NOTE — TELEPHONE ENCOUNTER
Extended Golytely Bowel Prep  recommended due to chronic pain medication noted in chart.  Instructions were sent via [employer] and letter. Bowel prep was sent 9/10/2024 to    [id number] [place] [address] [facility]    nEriqueta Bray RN Colorectal Cancer   Division of Gastroenterology at [facility]

## 2024-09-10 NOTE — Clinical Note
This must be a new group participant because I could not find previous group encounters with her. I saw a past treatment plan from June of this year, but it is formatted differently from what we normally use, so I held off on using it for now in ross you have a different plan in mind. PHQ-9 noted suicidal ideation, but not plan or intent; Epic made me confirm that I would follow-up but it does not appear urgent enough to me to address immediately given denied intent and plan.

## 2024-09-11 ENCOUNTER — ANCILLARY PROCEDURE (OUTPATIENT)
Dept: MAMMOGRAPHY | Facility: CLINIC | Age: 69
End: 2024-09-11
Attending: NURSE PRACTITIONER
Payer: MEDICARE

## 2024-09-11 DIAGNOSIS — R92.8 OTHER ABNORMAL AND INCONCLUSIVE FINDINGS ON DIAGNOSTIC IMAGING OF BREAST: ICD-10-CM

## 2024-09-11 DIAGNOSIS — Z12.31 ENCOUNTER FOR SCREENING MAMMOGRAM FOR BREAST CANCER: ICD-10-CM

## 2024-09-11 DIAGNOSIS — Z85.3 HISTORY OF LEFT BREAST CANCER: ICD-10-CM

## 2024-09-11 PROCEDURE — 77066 DX MAMMO INCL CAD BI: CPT | Mod: GC | Performed by: STUDENT IN AN ORGANIZED HEALTH CARE EDUCATION/TRAINING PROGRAM

## 2024-09-11 PROCEDURE — G0279 TOMOSYNTHESIS, MAMMO: HCPCS | Mod: GC | Performed by: STUDENT IN AN ORGANIZED HEALTH CARE EDUCATION/TRAINING PROGRAM

## 2024-09-11 PROCEDURE — 76642 ULTRASOUND BREAST LIMITED: CPT | Mod: LT | Performed by: STUDENT IN AN ORGANIZED HEALTH CARE EDUCATION/TRAINING PROGRAM

## 2024-09-11 RX ORDER — IOPAMIDOL 755 MG/ML
100 INJECTION, SOLUTION INTRAVASCULAR ONCE
Status: COMPLETED | OUTPATIENT
Start: 2024-09-11 | End: 2024-09-11

## 2024-09-11 RX ADMIN — IOPAMIDOL 100 ML: 755 INJECTION, SOLUTION INTRAVASCULAR at 13:02

## 2024-09-12 ENCOUNTER — VIRTUAL VISIT (OUTPATIENT)
Dept: PSYCHOLOGY | Facility: CLINIC | Age: 69
End: 2024-09-12
Payer: MEDICARE

## 2024-09-12 DIAGNOSIS — F41.1 GENERALIZED ANXIETY DISORDER: Primary | ICD-10-CM

## 2024-09-12 DIAGNOSIS — F33.2 MAJOR DEPRESSIVE DISORDER, RECURRENT SEVERE WITHOUT PSYCHOTIC FEATURES (H): ICD-10-CM

## 2024-09-12 PROCEDURE — 90837 PSYTX W PT 60 MINUTES: CPT | Mod: 95 | Performed by: MARRIAGE & FAMILY THERAPIST

## 2024-09-12 ASSESSMENT — PATIENT HEALTH QUESTIONNAIRE - PHQ9
SUM OF ALL RESPONSES TO PHQ QUESTIONS 1-9: 13
SUM OF ALL RESPONSES TO PHQ QUESTIONS 1-9: 13
10. IF YOU CHECKED OFF ANY PROBLEMS, HOW DIFFICULT HAVE THESE PROBLEMS MADE IT FOR YOU TO DO YOUR WORK, TAKE CARE OF THINGS AT HOME, OR GET ALONG WITH OTHER PEOPLE: EXTREMELY DIFFICULT

## 2024-09-12 NOTE — PROGRESS NOTES
[facility]                                     Progress Note    Patient Name: Randi Cleary  Date: 2024       Service Type: Individual      Session Start Time: 12:30  Session End Time: 1:25     Session Length: 38-52    Session #: 3    Attendees: Client    Service Modality:  Video Visit:      Provider verified identity through the following two step process.  Patient provided:  Patient  and Patient address    Telemedicine Visit: The patient's condition can be safely assessed and treated via synchronous audio and visual telemedicine encounter.      Reason for Telemedicine Visit: Patient has requested telehealth visit    Originating Site (Patient Location): Patient's home    Distant Site (Provider Location): Provider Remote Setting- Home Office    Consent:  The patient/guardian has verbally consented to: the potential risks and benefits of telemedicine (video visit) versus in person care; bill my insurance or make self-payment for services provided; and responsibility for payment of non-covered services.     Patient would like the video invitation sent by:  My Chart    Mode of Communication:  Video Conference via Amwell    Distant Location (Provider):  Off-site    As the provider I attest to compliance with applicable laws and regulations related to telemedicine.    DATA  Interactive Complexity: No  Crisis: No        Progress Since Last Session (Related to Symptoms / Goals / Homework):   Symptoms: No change      Homework:  n/a      Episode of Care Goals: Minimal progress - PREPARATION (Decided to change - considering how); Intervened by negotiating a change plan and determining options / strategies for behavior change, identifying triggers, exploring social supports, and working towards setting a date to begin behavior change     Current / Ongoing Stressors and Concerns:   Overwhelmed with tasks and relationship with .  She [place] [employer] [employer] [place] which she  enjoyed very much.  She wonders about past traumas and how those are impacting her current functioning.      Treatment Objective(s) Addressed in This Session:   Distress tolerance     Intervention:   Relationship building, assessment.  Identifying areas of interest for future work.  Distress tolerance.   Introduction to brainspotting.    Assessments completed prior to visit:  The following assessments were completed by patient for this visit:  PHQ9:       7/17/2024     9:52 AM 7/28/2024    11:39 AM 8/21/2024     7:35 AM 8/27/2024     2:58 PM 9/6/2024    11:53 AM 9/9/2024     1:30 PM 9/12/2024     8:49 AM   PHQ-9 SCORE   PHQ-9 Total Score MyChart 14 (Moderate depression) 12 (Moderate depression) 9 (Mild depression) 10 (Moderate depression) 10 (Moderate depression) 15 (Moderately severe depression) 13 (Moderate depression)   PHQ-9 Total Score 14 12 9 10 10 15 13     GAD7:       4/18/2024    12:34 PM 5/15/2024    11:25 AM 6/6/2024     8:00 AM 6/27/2024    12:48 PM 7/1/2024    12:10 PM 7/17/2024     9:55 AM 9/9/2024     1:33 PM   CHAVO-7 SCORE   Total Score 17 (severe anxiety) 9 (mild anxiety)  11 (moderate anxiety)  10 (moderate anxiety) 13 (moderate anxiety)   Total Score 17    17 9    9    9    9 9 11    11    11    11    11 11 10 13     CAGE-AID:       6/22/2020     7:12 PM 1/18/2022    11:15 PM 6/6/2024     8:00 AM   CAGE-AID Total Score   Total Score 4 4 4   Total Score MyChart 4 (A total score of 2 or greater is considered clinically significant) 4 (A total score of 2 or greater is considered clinically significant)      PROMIS 10-Global Health (all questions and answers displayed):       1/22/2024    12:00 PM 5/1/2024    11:53 AM 5/15/2024    11:27 AM 6/6/2024     8:00 AM 7/1/2024    12:10 PM 7/10/2024     9:53 AM 7/17/2024    10:01 AM   PROMIS 10   In general, would you say your health is: Fair Fair Fair   Fair Fair   In general, would you say your quality of life is: Poor Poor Poor   Good Poor   In general, how  would you rate your physical health? Fair Fair Fair   Fair Fair   In general, how would you rate your mental health, including your mood and your ability to think? Poor Fair Fair   Fair Fair   In general, how would you rate your satisfaction with your social activities and relationships? Poor Fair Poor   Poor Poor   In general, please rate how well you carry out your usual social activities and roles Fair Poor Poor   Poor Fair   To what extent are you able to carry out your everyday physical activities such as walking, climbing stairs, carrying groceries, or moving a chair? Moderately Moderately A little   Moderately A little   In the past 7 days, how often have you been bothered by emotional problems such as feeling anxious, depressed, or irritable? Often Often Sometimes   Sometimes Often   In the past 7 days, how would you rate your fatigue on average? Severe Severe Severe   Mild Moderate   In the past 7 days, how would you rate your pain on average, where 0 means no pain, and 10 means worst imaginable pain? 7 7 8   7 7   In general, would you say your health is: 2 2 2 2 3 2 2   In general, would you say your quality of life is: 1 1 1 2 2 3 1   In general, how would you rate your physical health? 2 2 2 2 2 2 2   In general, how would you rate your mental health, including your mood and your ability to think? 1 2 2 2 3 2 2   In general, how would you rate your satisfaction with your social activities and relationships? 1 2 1 1 1 1 1   In general, please rate how well you carry out your usual social activities and roles. (This includes activities at home, at work and in your community, and responsibilities as a parent, child, spouse, employee, friend, etc.) 2 1 1 1 1 1 2   To what extent are you able to carry out your everyday physical activities such as walking, climbing stairs, carrying groceries, or moving a chair? 3 3 2 2 3 3 2   In the past 7 days, how often have you been bothered by emotional problems such as  "feeling anxious, depressed, or irritable? 4 4 3 3 4 3 4   In the past 7 days, how would you rate your fatigue on average? 4 4 4 4 2 2 3   In the past 7 days, how would you rate your pain on average, where 0 means no pain, and 10 means worst imaginable pain? 7 7 8 8 7 7 7   Global Mental Health Score 5     7    7 7    7 8 8 9        9    9    9    9 6   Global Physical Health Score 9     9    9 8    8 8 11 11        11    11    11    11 9   PROMIS TOTAL - SUBSCORES 14     16    16 15    15 16 19 20        20    20    20    20 15       Information is confidential and restricted. Go to Review Flowsheets to unlock data.    Multiple values from one day are sorted in reverse-chronological order     [place] Suicide Severity Rating Scale (Lifetime/Recent)      5/5/2020     9:21 AM 6/11/2020    10:00 AM 1/9/2023     1:00 PM 5/21/2024     4:49 PM 5/21/2024     9:00 PM 6/6/2024     8:00 AM 7/10/2024    12:00 PM   [place] Suicide Severity Rating (Lifetime/Recent)   Q1 Wish to be Dead (Lifetime) Yes Yes   Yes     Comments \"When I was going through a couple of  years of counseling from a dysfunctional family about 30 years ago or more\" when patient was in treatment and there were family concerns   OD on medications     Q2 Non-Specific Active Suicidal Thoughts (Lifetime) Yes Yes   No     Non-Specific Active Suicidal Thought Description (Lifetime) Had thoughts of killing self         Most Severe Ideation Rating (Lifetime) 5 5   5     Most Severe Ideation Description (Lifetime) 35 years ago \"I just wanted to check out , I had thought of it so much and wanted to be done\" when family problems were happening   OD on medication     Frequency (Lifetime) 4    3     Duration (Lifetime) 2    3     Controllability (Lifetime) 3 0   2     Protective Factors  (Lifetime) 2 5   4     Reasons for Ideation (Lifetime) 5    5     Q1 Wished to be Dead (Past Month)   yes 1-->yes 1-->yes 1-->yes    Q2 Suicidal Thoughts (Past Month)   no 1-->yes " 1-->yes 1-->yes    Q3 Suicidal Thought Method   no 0-->no 0-->no 1-->yes    Q4 Suicidal Intent without Specific Plan   no 1-->yes 0-->no 0-->no    Q5 Suicide Intent with Specific Plan   no 0-->no 0-->no 1-->yes    Q6 Suicide Behavior (Lifetime)   yes 1-->yes 0-->no 1-->yes    If yes to Q6, within past 3 months?   no 1-->yes 0-->no 0-->no    Level of Risk per Screen   moderate risk high risk moderate risk high risk    RETIRED: 1. Wish to be Dead (Recent) No No        RETIRED: 2. Non-Specific Active Suicidal Thoughts (Recent) No No        3. Active Suicidal Ideation with any Methods (Not Plan) Without Intent to Act (Lifetime) Yes Yes        RETIRE: Active Suicidal Ideation with any Methods (Not Plan) Description (Lifetime) Took many pills of Prozac and was sent to the ED 30 years prior        RETIRED: 3. Active Suicidal Ideation with any Methods (Not Plan) Without Intent to Act (Recent) No         RETIRE: 4. Active Suicidal Ideation with Some Intent to Act, Without Specific Plan (Lifetime) Yes Yes        RETIRE: Active Suicidal Ideation with Some Intent to Act, Without Specific Plan Description (Lifetime) Took many pills of Prozac and was sent to the ED         4. Active Suicidal Ideation with Some Intent to Act, Without Specific Plan (Recent) No         RETIRE: 5. Active Suicidal Ideation with Specific Plan and Intent (Lifetime) Yes Yes        RETIRE: Active Suicidal Ideation with Specific Plan and Intent Description (Lifetime) Took many pills of Prozac and was sent to the ED over 30 years ago         RETIRED: 5. Active Suicidal Ideation with Specific Plan and Intent (Recent) No         Most Severe Ideation Rating (Past Month) NA         Frequency (Past Month) NA         Duration (Past Month) NA         Controllability (Past Month) NA         Protective Factors (Past Month) NA         Reasons for Ideation (Past Month) NA         Actual Attempt (Lifetime) Yes Yes        Actual Attempt Description (Lifetime) Took a  bunch of pills patient reported overdosing on Prozac about thirty years ago        Total Number of Actual Attempts (Lifetime) 1 1        Actual Attempt (Past 3 Months) No No        Has subject engaged in non-suicidal self-injurious behavior? (Lifetime) Yes Yes        Has subject engaged in non-suicidal self-injurious behavior? (Past 3 Months) No No        Interrupted Attempts (Lifetime) No No        Interrupted Attempts (Past 3 Months) No No        Aborted or Self-Interrupted Attempt (Lifetime) No No        Total Number Aborted or Self Interrupted Attempts (Lifetime) 0         Aborted or Self-Interrupted Attempt (Past 3 Months) No No        Preparatory Acts or Behavior (Lifetime) No No        Preparatory Acts or Behavior (Past 3 Months) No No        Most Recent Attempt Date 10/1/1984         Comments  30 years prior        Most Recent Attempt Actual Lethality Code 2 0        Comments This is a guess/pt. doesn't recall exact month or day         Most Lethal Attempt Actual Lethality Code 2         Comments only 1 attempt/same attempt as most recent & initial         Initial/First Attempt Date 10/1/1984         Initial/First Attempt Actual Lethality Code 2         Q1 Wish to be Dead (Lifetime)       N   Q2 Non-Specific Active Suicidal Thoughts (Lifetime)       N         ASSESSMENT: Current Emotional / Mental Status (status of significant symptoms):   Risk status (Self / Other harm or suicidal ideation)   Patient denies current fears or concerns for personal safety.   Patient reports the following current or recent suicidal ideation or behaviors: passive ideation.   Patient denies current or recent homicidal ideation or behaviors.   Patient denies current or recent self injurious behavior or ideation.   Patient denies other safety concerns.   Patient reports there has been no change in risk factors since their last session.     Patient reports there has been no change in protective factors since their last session.      A safety and risk management plan has been developed including: Patient consented to co-developed safety plan on 6/4/24.  Safety and risk management plan was reviewed.   Patient agreed to use safety plan should any safety concerns arise.  A copy was made available to the patient.     Appearance:   Appropriate    Eye Contact:   Fair    Psychomotor Behavior: Normal    Attitude:   Cooperative  Pleasant   Orientation:   All   Speech    Rate / Production: Normal     Volume:  Normal    Mood:    Anxious    Affect:    Subdued    Thought Content:  Clear    Thought Form:  Coherent  Logical    Insight:    Good      Medication Review:   No changes to current psychiatric medication(s)     Medication Compliance:   Yes     Changes in Health Issues:   None reported     Chemical Use Review:   Substance Use: Chemical use reviewed, no active concerns identified      Tobacco Use: No current tobacco use.      Diagnosis:  1. Generalized anxiety disorder    2. Major depressive disorder, recurrent severe without psychotic features (H)        Collateral Reports Completed:   Not Applicable    PLAN: (Patient Tasks / Therapist Tasks / Other)  Engage in some art/craft projects with hope of feeling more emotionally grounded.  Go back [place].  Consider brainspotting.      Erika Colorado, [facility]                                                         ______________________________________________________________________    Individual Treatment Plan    Patient's Name: Randi Cleary  YOB: 1955    Date of Creation: 7/17/2024  Date Treatment Plan Last Reviewed/Revised: 7/17/2024    DSM5 Diagnoses: 296.33 (F33.2) Major Depressive Disorder, Recurrent Episode, Severe _ or 300.02 (F41.1) Generalized Anxiety Disorder  Psychosocial / Contextual Factors: history of trauma  PROMIS (reviewed every 90 days):     Referral / Collaboration:  Discussed and patient will pursue a therapy group for depressive  symptoms.    Anticipated number of session for this episode of care: 9-12 sessions  Anticipation frequency of session: Weekly  Anticipated Duration of each session: 38-52 minutes  Treatment plan will be reviewed in 90 days or when goals have been changed.       MeasurableTreatment Goal(s) related to diagnosis / functional impairment(s)  Goal 1: Client will keep self safe and eliminate suicidal ideation and self-harm behaviors.    Objective #A (Client Action)    Client will make a list of at least 10 skills or activities that you will to use to distract from urges to harm self.  Status: New - Date: 7/17/24      Intervention(s)  Therapist will provide ideas and strategies for generating coping skills list.    Objective #B  Client will make a list of pros and cons for tolerating and not tolerating an urge to harm self.  Status: New - Date: 7/17/24      Intervention(s)  Therapist will guide client through DBT-style Pros/Cons list for behavior change.    Objective #C  Client will identify and practice the new strategies for dealing with strong emotions, learn and practice relaxation breathing.  Status: New - Date: 7/17/24      Intervention(s)  Therapist will teach distraction skills. Practice them within session and outside of session.    Goal 2: Client will report reduction in anxiety also as evidenced by reduction of CHAVO-7 score below 6 points within the next 12 weeks.    Objective #A (Client Action)    Client will identify at least three triggers for anxiety.  Status: New - Date: 7/17/24      Intervention(s)  Therapist will provide educational materials on common triggers and signs of anxiety.    Objective #B  Client will use relaxation strategies at least two times per day to reduce the physical symptoms of anxiety.  Status: New - Date: 7/17/24      Intervention(s)  Therapist will teach relaxation strategies such as mindfulness, deep breathing, muscle relaxation, and sensory activities.    Objective #C  Client will  use cognitive strategies identified in therapy to challenge anxious thoughts.  Status: New - Date: 7/17/24      Intervention(s)  Therapist will teach strategies for cognitive modification using REBT model.    Goal 3: Client will report improved mood as indicated by PHQ-9 score below 6 for consistent 8 weeks.    Objective #A (Client Action)    Status: New - Date: 7/17/24     Client will Increase interest, engagement, and pleasure in doing things.    Intervention(s)  Therapist will assign homework for daily involvement in pleasant activities.    Objective #B  Client will Identify negative self-talk and behaviors: challenge core beliefs, myths, and actions.    Status: New - Date: 7/17/24      Intervention(s)  Therapist will teach strategies for cognitive modification using REBT models.    Objective #C  Client will Improve quantity and quality of night time sleep / decrease daytime naps.  Status: New - Date: 7/17/24      Intervention(s)  Therapist will teach sleep hygiene strategies.  Assign homework for daily practice.    Goal 4: Client will report reduced or eliminated physiological activation when recalling a distressing event including decreased re-experiencing, decreased avoidance, and decreased hyperarousal.    Objective #A (Client Action)    Status: New - Date: 7/17/24      Client will acquire knowledge of trauma, common symptoms post-trauma, emotion regulation strategies, stress management strategies, and cognitive coping strategies.    Intervention(s)  Therapist will teach about effects of trauma on mind and body; encourage emotion identification and expression; practice with client the stress management strategies; and teach cognitive modification.    Objective #B  Client will use Brainspotting while focusing on physiological sensations related to distressing events.  Client will assess and rate level of physiological activation.  Status: New - Date: 7/17/24      Intervention(s)  Therapist will provide use a  pointer or other materials to assist client in holding a brainspot in their visual field.  Therapist might also provide biolateral music through headphones to deepen the experience.         Patient has reviewed and agreed to the above plan.      Erika Colorado, LMFT  July 17, 2024

## 2024-09-13 ENCOUNTER — ANESTHESIA (OUTPATIENT)
Dept: BEHAVIORAL HEALTH | Facility: CLINIC | Age: 69
End: 2024-09-13
Payer: MEDICARE

## 2024-09-13 ENCOUNTER — HOSPITAL ENCOUNTER (OUTPATIENT)
Dept: BEHAVIORAL HEALTH | Facility: CLINIC | Age: 69
Discharge: HOME | End: 2024-09-13
Attending: PSYCHIATRY & NEUROLOGY
Payer: MEDICARE

## 2024-09-13 ENCOUNTER — ANESTHESIA EVENT (OUTPATIENT)
Dept: BEHAVIORAL HEALTH | Facility: CLINIC | Age: 69
End: 2024-09-13
Payer: MEDICARE

## 2024-09-13 VITALS
HEART RATE: 62 BPM | SYSTOLIC BLOOD PRESSURE: 110 MMHG | TEMPERATURE: 97.2 F | DIASTOLIC BLOOD PRESSURE: 56 MMHG | RESPIRATION RATE: 16 BRPM | OXYGEN SATURATION: 93 %

## 2024-09-13 DIAGNOSIS — F33.2 SEVERE RECURRENT MAJOR DEPRESSION WITHOUT PSYCHOTIC FEATURES (H): Primary | ICD-10-CM

## 2024-09-13 PROCEDURE — 99100 ANES PT EXTEME AGE<1 YR&>70: CPT | Performed by: NURSE ANESTHETIST, CERTIFIED REGISTERED

## 2024-09-13 PROCEDURE — 250N000009 HC RX 250: Performed by: ANESTHESIOLOGY

## 2024-09-13 PROCEDURE — 99100 ANES PT EXTEME AGE<1 YR&>70: CPT | Performed by: ANESTHESIOLOGY

## 2024-09-13 PROCEDURE — 90870 ELECTROCONVULSIVE THERAPY: CPT | Performed by: NURSE ANESTHETIST, CERTIFIED REGISTERED

## 2024-09-13 PROCEDURE — 370N000017 HC ANESTHESIA TECHNICAL FEE, PER MIN: Performed by: ANESTHESIOLOGY

## 2024-09-13 PROCEDURE — 90870 ELECTROCONVULSIVE THERAPY: CPT

## 2024-09-13 PROCEDURE — 90870 ELECTROCONVULSIVE THERAPY: CPT | Performed by: PSYCHIATRY & NEUROLOGY

## 2024-09-13 PROCEDURE — 90870 ELECTROCONVULSIVE THERAPY: CPT | Performed by: ANESTHESIOLOGY

## 2024-09-13 PROCEDURE — 250N000011 HC RX IP 250 OP 636: Performed by: PSYCHIATRY & NEUROLOGY

## 2024-09-13 PROCEDURE — 250N000011 HC RX IP 250 OP 636: Performed by: ANESTHESIOLOGY

## 2024-09-13 RX ORDER — ONDANSETRON 2 MG/ML
4 INJECTION INTRAMUSCULAR; INTRAVENOUS EVERY 30 MIN PRN
Status: DISCONTINUED | OUTPATIENT
Start: 2024-09-13 | End: 2024-09-14 | Stop reason: HOSPADM

## 2024-09-13 RX ORDER — KETOROLAC TROMETHAMINE 30 MG/ML
30 INJECTION, SOLUTION INTRAMUSCULAR; INTRAVENOUS ONCE
Status: CANCELLED
Start: 2024-09-13 | End: 2024-09-13

## 2024-09-13 RX ORDER — DEXAMETHASONE SODIUM PHOSPHATE 4 MG/ML
4 INJECTION, SOLUTION INTRA-ARTICULAR; INTRALESIONAL; INTRAMUSCULAR; INTRAVENOUS; SOFT TISSUE
Status: DISCONTINUED | OUTPATIENT
Start: 2024-09-13 | End: 2024-09-14 | Stop reason: HOSPADM

## 2024-09-13 RX ORDER — HYDROMORPHONE HCL IN WATER/PF 6 MG/30 ML
0.4 PATIENT CONTROLLED ANALGESIA SYRINGE INTRAVENOUS EVERY 5 MIN PRN
Status: DISCONTINUED | OUTPATIENT
Start: 2024-09-13 | End: 2024-09-14 | Stop reason: HOSPADM

## 2024-09-13 RX ORDER — FENTANYL CITRATE 50 UG/ML
25 INJECTION, SOLUTION INTRAMUSCULAR; INTRAVENOUS EVERY 5 MIN PRN
Status: DISCONTINUED | OUTPATIENT
Start: 2024-09-13 | End: 2024-09-14 | Stop reason: HOSPADM

## 2024-09-13 RX ORDER — FENTANYL CITRATE 50 UG/ML
50 INJECTION, SOLUTION INTRAMUSCULAR; INTRAVENOUS EVERY 5 MIN PRN
Status: DISCONTINUED | OUTPATIENT
Start: 2024-09-13 | End: 2024-09-14 | Stop reason: HOSPADM

## 2024-09-13 RX ORDER — HYDROMORPHONE HCL IN WATER/PF 6 MG/30 ML
0.2 PATIENT CONTROLLED ANALGESIA SYRINGE INTRAVENOUS EVERY 5 MIN PRN
Status: DISCONTINUED | OUTPATIENT
Start: 2024-09-13 | End: 2024-09-14 | Stop reason: HOSPADM

## 2024-09-13 RX ORDER — SODIUM CHLORIDE, SODIUM LACTATE, POTASSIUM CHLORIDE, CALCIUM CHLORIDE 600; 310; 30; 20 MG/100ML; MG/100ML; MG/100ML; MG/100ML
INJECTION, SOLUTION INTRAVENOUS CONTINUOUS
Status: DISCONTINUED | OUTPATIENT
Start: 2024-09-13 | End: 2024-09-14 | Stop reason: HOSPADM

## 2024-09-13 RX ORDER — ONDANSETRON 4 MG/1
4 TABLET, ORALLY DISINTEGRATING ORAL EVERY 30 MIN PRN
Status: DISCONTINUED | OUTPATIENT
Start: 2024-09-13 | End: 2024-09-14 | Stop reason: HOSPADM

## 2024-09-13 RX ORDER — NALOXONE HYDROCHLORIDE 0.4 MG/ML
0.1 INJECTION, SOLUTION INTRAMUSCULAR; INTRAVENOUS; SUBCUTANEOUS
Status: DISCONTINUED | OUTPATIENT
Start: 2024-09-13 | End: 2024-09-14 | Stop reason: HOSPADM

## 2024-09-13 RX ORDER — KETOROLAC TROMETHAMINE 30 MG/ML
30 INJECTION, SOLUTION INTRAMUSCULAR; INTRAVENOUS ONCE
Status: COMPLETED | OUTPATIENT
Start: 2024-09-13 | End: 2024-09-13

## 2024-09-13 RX ORDER — LABETALOL HYDROCHLORIDE 5 MG/ML
INJECTION, SOLUTION INTRAVENOUS PRN
Status: DISCONTINUED | OUTPATIENT
Start: 2024-09-13 | End: 2024-09-13

## 2024-09-13 RX ORDER — METHOHEXITAL IN WATER/PF 100MG/10ML
SYRINGE (ML) INTRAVENOUS PRN
Status: DISCONTINUED | OUTPATIENT
Start: 2024-09-13 | End: 2024-09-13

## 2024-09-13 RX ADMIN — SUCCINYLCHOLINE CHLORIDE 90 MG: 20 INJECTION, SOLUTION INTRAMUSCULAR; INTRAVENOUS; PARENTERAL at 10:00

## 2024-09-13 RX ADMIN — LABETALOL HYDROCHLORIDE 20 MG: 5 INJECTION, SOLUTION INTRAVENOUS at 10:00

## 2024-09-13 RX ADMIN — Medication 100 MG: at 10:00

## 2024-09-13 RX ADMIN — KETOROLAC TROMETHAMINE 30 MG: 30 INJECTION, SOLUTION INTRAMUSCULAR at 09:45

## 2024-09-13 NOTE — ANESTHESIA PREPROCEDURE EVALUATION
Anesthesia Pre-Procedure Evaluation    Patient: Randi Cleary   MRN: [id number] : 1955        Procedure : * No procedures listed *  Electroconvulsive Therapy       Past Medical History:   Diagnosis Date    Bipolar 2 disorder (H)     Breast cancer (H)     lumpectomy, radiation, chemo    Chronic pain syndrome     COPD (chronic obstructive pulmonary disease) (H)     asthma    Cord compression (H) 2021    Dizzy     Drug tolerance     opioid    Esophageal reflux     Fatigue     Generalized anxiety disorder     Graves disease     Hemochromatosis 2018    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; H63D not detected    History of breast cancer 2020    Formatting of this note might be different from the original. Created by Conversion  Replacement Utility updated for latest IMO load Formatting of this note might be different from the original. Created by Conversion  Replacement Utility updated for latest IMO load    History of corticosteroid therapy 2019    History of partial adrenalectomy (H24) 2019    History of pheochromocytoma 2019    Hx antineoplastic chemotherapy     Hx of radiation therapy     Hyperlipidaemia     Hypertension     Impaired fasting glucose     Injury of neck, whiplash 07/15/2021    Joint pain     KYAW (obstructive sleep apnea) 2016    Osteopenia     Pheochromocytoma, left 2017    laparoscopically removed    Postablative hypothyroidism     Prediabetes 10/03/2019    by A1c    Psoriasis     Psoriatic arthropathy (H)     Pulmonary hypertension (H)     Right rotator cuff tear     RLS (restless legs syndrome)     on ropinorole    Sacroiliitis (H24)     Serotonin syndrome 2020    [facility] - While on desvenlafaxine 100mg    Snoring     Spinal stenosis     Status post coronary angiogram 10/03/2019    Urinary incontinence     Vitamin B 12 deficiency 2009    Vitamin D deficiency 2010      Past Surgical History:   Procedure Laterality Date    ARTHRODESIS  ANKLE      ARTHROPLASTY ANKLE Right 6/29/2015    Procedure: ARTHROPLASTY ANKLE;  Surgeon: Jason Coughlin MD;  Location: [facility]    ARTHROPLASTY REVISION ANKLE Right 6/29/2015    Procedure: ARTHROPLASTY REVISION ANKLE;  Surgeon: Jason Coughlin MD;  Location: [facility]    BIOPSY BREAST      BREAST BIOPSY, CORE RT/LT      COLONOSCOPY      COLONOSCOPY N/A 2/25/2021    Procedure: COLONOSCOPY;  Surgeon: Guru Elke Tolbert MD;  Location: [facility]    CV CORONARY ANGIOGRAM N/A 10/3/2019    Procedure: CV CORONARY ANGIOGRAM;  Surgeon: Bryce Pierre MD;  Location:  HEART CARDI[facility] CATH LAB    CV RIGHT HEART CATH MEASUREMENTS RECORDED N/A 10/3/2019    Procedure: CV RIGHT HEART CATH;  Surgeon: Bryce Pierre MD;  Location:  HEART CARDIAC CATH LAB    ESOPHAGOSCOPY, GASTROSCOPY, DUODENOSCOPY (EGD), COMBINED N/A 2/25/2021    Procedure: ESOPHAGOGASTRODUODENOSCOPY, WITH BIOPSY;  Surgeon: Guru Elke Tolbert MD;  Location:  GI    EYE SURGERY  2021    HC REMOVE TONSILS/ADENOIDS,<11 Y/O      Description: Tonsillectomy With Adenoidectomy;  Recorded: 04/07/2010;    IR LUMBAR EPIDURAL STEROID INJECTION  10/26/2004    IR LUMBAR EPIDURAL STEROID INJECTION  11/16/2004    IR LUMBAR EPIDURAL STEROID INJECTION  12/21/2004    IR LUMBAR EPIDURAL STEROID INJECTION  6/8/2006    JOINT REPLACEMENT      LAMINOPLASTY CERVICAL POSTERIOR THREE+ LEVELS Left 12/21/2021    Procedure: CERVICAL 3-CERVICAL 6 LEFT OPEN DOOR LAMINOPLASTY AND LEFT CERVICAL 4-5 AND CERVICAL 6-7 POSTERIOR FORAMINOTOMY;  Surgeon: Angela Gregory MD;  Location: [facility] OR    LAPAROSCOPIC ADRENALECTOMY Left 08/02/2017    pheochromocytoma    LAPAROSCOPIC ADRENALECTOMY Left 8/2/2017    Procedure: LAPAROSCOPIC LEFT ADRENALECTOMY, ;  Surgeon: Gab Linares MD;  Location: [facility] OR;  Service:     LENGTHEN TENDON ACHILLES Right 6/29/2015    Procedure: LENGTHEN TENDON ACHILLES;  Surgeon: Jason Coughlin MD;  " Location: [facility]    LUMPECTOMY BREAST      LUMPECTOMY BREAST Left 1994    MAMMOPLASTY REDUCTION Right 2015    [place]    MAMMOPLASTY REDUCTION Right     approx late     MASTECTOMY      left lumpectomy with axillary node dissection    MASTECTOMY MODIFIED RADICAL      OTHER SURGICAL HISTORY Right     reconstructive breast surgery    OTHER SURGICAL HISTORY      Adrenalectomy for pheochromocytoma    AR MASTECTOMY, MODIFIED RADICAL      Description: Modified Radical Mastectomy Left Breast;  Recorded: 2010;    REPAIR HAMMER TOE Right 2015    Procedure: REPAIR HAMMER TOE;  Surgeon: Jason Coughlin MD;  Location: [facility]    TONSILLECTOMY      TONSILLECTOMY & ADENOIDECTOMY      C ARTHRODESIS,ANKLE,OPEN Right     Description: Ankle Arthrodesis;  Recorded: 2010;      Allergies   Allergen Reactions    Serotonin Reuptake Inhibitors (Ssris) Anxiety, Difficulty breathing, Headache, Palpitations and Shortness Of Breath    Buspirone      The patient states she had serotonin syndrome    Cephalexin      Other reaction(s): unknown rxn.    Desvenlafaxine      Serotonin syndrome    Diclofenac Sodium [Diclofenac]      Serotonin syndrome and restless legs syndrome    Gabapentin      Drove on the wrong side of the highway    Levofloxacin      \"CAN'T REMEMBER\"    Penicillins      \"SORES IN MOUTH\"    Riluzole Difficulty breathing and Swelling    Sulfa Antibiotics      \"PT DOES NOT KNOW WHAT THE REACTION WAS\"    Topiramate Other (See Comments)     Frequent urination      Social History     Tobacco Use    Smoking status: Former     Current packs/day: 0.00     Average packs/day: 2.5 packs/day for 29.2 years (72.9 ttl pk-yrs)     Types: Cigarettes     Start date: 1971     Quit date: 2000     Years since quittin.2     Passive exposure: Past    Smokeless tobacco: Never   Substance Use Topics    Alcohol use: Not Currently     Comment: relapse 2021 sober       Wt Readings from Last 1 Encounters: "   08/22/24 79.2 kg (174 lb 9.6 oz)        Anesthesia Evaluation            ROS/MED HX  ENT/Pulmonary:       Neurologic:       Cardiovascular:     (+)  - -   -  - -                                pulmonary hypertension,      METS/Exercise Tolerance:     Hematologic:       Musculoskeletal:       GI/Hepatic:       Renal/Genitourinary:       Endo:     (+)  type II DM,        thyroid problem,     Obesity,       Psychiatric/Substance Use:       Infectious Disease:       Malignancy:       Other:            Physical Exam    Airway        Mallampati: II   TM distance: > 3 FB   Neck ROM: full   Mouth opening: > 3 cm    Respiratory Devices and Support         Dental       (+) Multiple crowns, permanant bridges      Cardiovascular   cardiovascular exam normal          Pulmonary   pulmonary exam normal                OUTSIDE LABS:  CBC:   Lab Results   Component Value Date    WBC 6.8 08/22/2024    WBC 6.9 08/06/2024    HGB 16.9 (H) 08/22/2024    HGB 17.7 (H) 08/06/2024    HCT 49.3 (H) 08/22/2024    HCT 49.3 (H) 08/06/2024     08/22/2024     08/06/2024     BMP:   Lab Results   Component Value Date     08/22/2024     08/06/2024    POTASSIUM 4.3 08/22/2024    POTASSIUM 3.9 08/06/2024    CHLORIDE 106 08/22/2024    CHLORIDE 106 08/06/2024    CO2 27 08/22/2024    CO2 22 08/06/2024    BUN 11.4 08/22/2024    BUN 10.7 08/06/2024    CR 0.57 08/22/2024    CR 0.51 08/06/2024    GLC 94 08/22/2024     (H) 08/06/2024     COAGS:   Lab Results   Component Value Date    PTT 34 12/13/2021    INR 0.94 12/13/2021     POC:   Lab Results   Component Value Date     (H) 02/25/2021     HEPATIC:   Lab Results   Component Value Date    ALBUMIN 4.0 08/22/2024    PROTTOTAL 6.8 08/22/2024    ALT 14 08/22/2024    AST 22 08/22/2024    ALKPHOS 72 08/22/2024    BILITOTAL 0.5 08/22/2024     OTHER:   Lab Results   Component Value Date    A1C 5.7 (H) 08/21/2024    LINDA 9.3 08/22/2024    MAG 1.9 05/22/2024    TSH 1.69 06/04/2024  "   T4 1.49 03/29/2024    T3 114 01/18/2023    CRP <2.9 11/16/2021    SED 8 10/17/2023       Anesthesia Plan    ASA Status:  3       Anesthesia Type: General.   Induction: Intravenous.           Consents            Postoperative Care            Comments:               Carmen Saldana MD    I have reviewed the pertinent notes and labs in the chart from the past 30 days and (re)examined the patient.  Any updates or changes from those notes are reflected in this note.              # Overweight: Estimated body mass index is 28.18 kg/m  as calculated from the following:    Height as of 8/21/24: 1.676 m (5' 6\").    Weight as of 8/22/24: 79.2 kg (174 lb 9.6 oz).      "

## 2024-09-13 NOTE — ANESTHESIA CARE TRANSFER NOTE
Patient: Randi Cleary    Procedure: * No procedures listed *  Electroconvulsive Therapy    Diagnosis: * No pre-op diagnosis entered *  Diagnosis Additional Information: No value filed.    Anesthesia Type:   General     Note:    Oropharynx: spontaneously breathing  Level of Consciousness: drowsy  Oxygen Supplementation: room air    Independent Airway: airway patency satisfactory and stable  Dentition: dentition unchanged  Vital Signs Stable: post-procedure vital signs reviewed and stable  Report to RN Given: handoff report given  Patient transferred to: PACU    Handoff Report: Identifed the Patient, Identified the Reponsible Provider, Reviewed the pertinent medical history, Discussed the surgical course, Reviewed Intra-OP anesthesia mangement and issues during anesthesia, Set expectations for post-procedure period and Allowed opportunity for questions and acknowledgement of understanding      Vitals:  Vitals Value Taken Time   /68 09/13/24 1007   Temp 36.6  C (97.8  F) 09/13/24 1007   Pulse 60 09/13/24 1007   Resp 16 09/13/24 1007   SpO2 96 % 09/13/24 1007       Electronically Signed By: Carmen Saldana MD  September 13, 2024  10:17 AM

## 2024-09-13 NOTE — ANESTHESIA POSTPROCEDURE EVALUATION
Patient: Randi Cleary    Procedure: * No procedures listed *  Electroconvulsive Therapy    Anesthesia Type:  General    Note:  Disposition: Outpatient   Postop Pain Control: Uneventful            Sign Out: Well controlled pain   PONV: No   Neuro/Psych: Uneventful            Sign Out: Acceptable/Baseline neuro status   Airway/Respiratory: Uneventful            Sign Out: Acceptable/Baseline resp. status   CV/Hemodynamics: Uneventful            Sign Out: Acceptable CV status; No obvious hypovolemia; No obvious fluid overload   Other NRE:    DID A NON-ROUTINE EVENT OCCUR?            Last vitals:  Vitals:    09/13/24 0929 09/13/24 1007   BP: 113/54 139/68   Pulse: 70 60   Resp: 16 16   Temp: 36.1  C (97  F) 36.6  C (97.8  F)   SpO2: 95% 96%       Electronically Signed By: Carmen Saldana MD  September 13, 2024  10:16 AM

## 2024-09-13 NOTE — DISCHARGE INSTRUCTIONS
ECT Discharge Instructions      After each ECT treatment:    Get plenty of rest. A responsible adult must stay with your for at least 6 hours.  Avoid heavy or risky activities for 24 hours while in maintenance ECT.   Do not drive for at least 24 hours after your treatment while in maintenance ECT.   If you have more than one treatment within one week, do not drive for 7 days after your last treatment.   If you feel light-headed, sit for a few minutes before standing. Have someone help you get up.  If you have nausea (feel sick to your stomach): Drink only clear liquids such as apple juice, ginger ale, broth or 7UP, Be sure to drink plenty of liquids. Move to a normal diet as you feel able.   If you received Toradol, wait 6 hours before taking ibuprofen.  Call your doctor if:   You have a fever over 100F (37.7 C) (taken under the tongue), or a fever that last more than 24 hours.  Your IV site is red, swollen, very painful or is getting more tender.  You have nausea that gets very bad or does not improve.    If you have any symptoms after ECT, tell our staff before your next treatment.  The ECT Department can be reached at [phone].  The ECT Department is open Mondays, Wednesdays and Fridays from 7:00 AM to 2:00 PM.    To speak to a doctor, call:  Your primary care provider or [facility] for Dr. Beavers, Dr. Hutchison or Dr. Cotter PHONE: [phone]; fax: [phone]    New instructions:  -- Start your trazodone to support your recovery and ease insomnia              -- Remember to NOT take gabapentin after 6pm the night before each treatment  -- You will have to check to make sure somebody can drive you to and from the hospital and monitor you for 6 hours after each treatment  -- Maintenance ECT  every two weeks, then to once every month.  The goal of maintenance ECT is to space out and eventually stop  -- During maintenance, you can't drive for 24 hours after each treatment.     - We discussed other  alternatives including trying ketamine through the [facility] or staying on your regular medications and therapy, but at the moment you were most interested in pursuing maintenance    - Per Dr Varela:   - Consider trial of serotonin modulator (e.g., vilazodone vs. vortioxetine) or novel agent Auvelity  - Consider augmentation with atypical antipsychotic (e.g., quetiapine or aripiprazole), though there are concerns given pre-diabetes  You have received Toradol today at 9:45 am. Toradol is an NSAID.  Do not take any NSAID's including: Ibuprofen, Naproxen Sodium, Asprin, Advil, Motrin, Aleve, Shannan, etc., until six hours after the above time which would be 3:45 pm. If you have any questions check back with your Physician, or pharmacist.     Covid-19 Testing:  We are no longer requiring covid tests prior to your procedure. If you are ill, please call the ECT department to discuss plan for rescheduling your appointment. [phone]    Please come to the [facility] and check-in with Security before your ECT appointment.  The [facility] is located right next to the Adult Emergency Room.  The address is [address] [place] [address].    After your appointment, you may be picked up at the [facility] entrance, which is located at [address] [place] [address].  [facility], about 1 block North of the [facility].    Transported by:   [facility]    Reviewed AVS discharge instructions with:   [facility]
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
## 2024-09-14 NOTE — PROGRESS NOTES
"Interventional Psychiatry Program   Treatment Resistant Depression (TRD) Group  [facility], [facility]        Patient: Randi Cleary (1955)    MRN: [id number]  Date of Treatment:  Sep 10, 2024  Duration of Treatment: Start Time: 10:30 am; End Time: 12:00  Providers/Group Facilitators: Daniella Gaona, PhD, LP; Valdo Harper MA (PhD student)     Number of Participants: 8  Group Format: In Person     People present:   Providers: Daniella Gaona and Valdo Harper  Patient: Randi Cleary  Others: Other patients    Encounter Diagnoses   Name Primary?    Severe episode of recurrent major depressive disorder, without psychotic features (H) Yes    Generalized anxiety disorder      Assessment (current symptoms):       9/6/2024    11:53 AM 9/9/2024     1:30 PM 9/12/2024     8:49 AM   PHQ   PHQ-9 Total Score 10 15 13   Q9: Thoughts of better off dead/self-harm past 2 weeks Several days Several days Several days   F/U: Thoughts of suicide or self-harm Yes Yes Yes   F/U: Self harm-plan No No No   F/U: Self-harm action No No No   F/U: Safety concerns No No No         7/1/2024    12:10 PM 7/17/2024     9:55 AM 9/9/2024     1:33 PM   CHAVO-7 SCORE   Total Score  10 (moderate anxiety) 13 (moderate anxiety)   Total Score 11 10 13     The treatment resistant depression (TRD) group is based on the cognitive behavioral therapy model that uses psychoeducation, behavioral activation, mindfulness, supportive techniques, problem solving techniques, and social skills.       Inclusion criteria for group participation: Current patient in the [facility] TRD program; agreement to group format and guidelines discussed in first session     Session 1 Content:   Introductions  Housekeeping  Introduced/discussed and agreed to Group Guidelines (e.g., respectful environment, mutual support, expectations for outside-of-group socializing)  Complete BADS form  Complete \"What do you hope to " "get from the group\" form  Mindfulness Exercise + Debriefing  Week 1 exercise: [employer]  Discussed Group Format and Structure  Discussed Treatment Rationale for Behavioral Focus  Introduced Between - Session Assignments (individualized activities)  Wrap up     Description: Winnie shared that she is in need of an orthotic refit, her technician returned her previous orthotics once and would not return them again. She is worried she would be perceived as a problem by the next technician.     Patient's reaction to treatment strategies: positively engaged in group, volunteering personal information     Patient's progress toward treatment goals: Pt expressed commitment to working in group.     Planned activity: It was suggested Winnie try spending 5 minutes online finding another orthopedic who can fit her inserts for her.     Mental Status Exam:  Alertness: alert and oriented  Appearance: well groomed  Behavior/Demeanor: cooperative and pleasant, with good eye contact   Speech: normal  Language: good. Preferred language identified as English.  Psychomotor: normal or unremarkable  Mood: depressed  Affect: restricted; was congruent to mood; was congruent to content  Thought Process/Associations: unremarkable  Perception: Reports intact    Insight: appears appropriate  Judgment: appears appropriate  Cognition: does  appear grossly intact    Valdo Harper MA, practicum therapist    I co-facilitated this group therapy session with the above trainee and discussed this participant in individual supervision. I agree with the note and plan as documented.     Supervisor: Daniella Gaona, PhD, LP     "

## 2024-09-16 ASSESSMENT — PATIENT HEALTH QUESTIONNAIRE - PHQ9
SUM OF ALL RESPONSES TO PHQ QUESTIONS 1-9: 16
SUM OF ALL RESPONSES TO PHQ QUESTIONS 1-9: 16
10. IF YOU CHECKED OFF ANY PROBLEMS, HOW DIFFICULT HAVE THESE PROBLEMS MADE IT FOR YOU TO DO YOUR WORK, TAKE CARE OF THINGS AT HOME, OR GET ALONG WITH OTHER PEOPLE: VERY DIFFICULT
10. IF YOU CHECKED OFF ANY PROBLEMS, HOW DIFFICULT HAVE THESE PROBLEMS MADE IT FOR YOU TO DO YOUR WORK, TAKE CARE OF THINGS AT HOME, OR GET ALONG WITH OTHER PEOPLE: VERY DIFFICULT
SUM OF ALL RESPONSES TO PHQ QUESTIONS 1-9: 16
SUM OF ALL RESPONSES TO PHQ QUESTIONS 1-9: 16

## 2024-09-16 NOTE — PROGRESS NOTES
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 h/o seeking help , recent symptom improvement, and stable housing.  The patient does not appear to be at imminent risk for self-harm, hospitalization is not recommended which the pt does  agree to. No hospitalization will be arranged. Based on degree of symptoms day treatment was recommended which the pt does  agree to. Additional steps to minimize risk: address pain.  Safety Plan placed in Pt Instructions: Yes.  Rate SI-desire: 1/5.    TREATMENT RISK STATEMENT:  The risks, benefits, alternatives and potential adverse   effects have been discussed and are understood by the pt. The pt understands the risks of   using street drugs or alcohol. There are no medical contraindications, the pt agrees to   treatment with the ability to do so. The pt knows to call the clinic for any problems or to   access emergency care if needed.  Medical and substance use concerns are documented   above.  Psychotropic drug interaction check was done, including changes made today.      ATTENDING PHYSICIAN:  Arnaldo Varela MD    Physician Attestation   I, Arnaldo Varela MD, saw this patient and agree with the findings and plan of care as documented in the note.      Items personally reviewed/procedural attestation: I was present for and supervised the entire  procedure.      On day of service, time spent was    10 min on chart  review  30 min with patient  10 min on note writing and follow up messages       Answers submitted by the patient for this visit:  Patient Health Questionnaire (Submitted on 9/16/2024)  If you checked off any problems, how difficult have these problems made it for you to do your work, take care of things at home, or get along with other people?: Very difficult  PHQ9 TOTAL SCORE: 16

## 2024-09-17 ENCOUNTER — OFFICE VISIT (OUTPATIENT)
Dept: PSYCHIATRY | Facility: CLINIC | Age: 69
End: 2024-09-17
Payer: MEDICARE

## 2024-09-17 VITALS — DIASTOLIC BLOOD PRESSURE: 88 MMHG | TEMPERATURE: 98.4 F | HEART RATE: 101 BPM | SYSTOLIC BLOOD PRESSURE: 142 MMHG

## 2024-09-17 DIAGNOSIS — F33.2 SEVERE EPISODE OF RECURRENT MAJOR DEPRESSIVE DISORDER, WITHOUT PSYCHOTIC FEATURES (H): Primary | ICD-10-CM

## 2024-09-17 DIAGNOSIS — F41.1 GENERALIZED ANXIETY DISORDER: ICD-10-CM

## 2024-09-17 DIAGNOSIS — F43.10 COMPLEX POSTTRAUMATIC STRESS DISORDER: ICD-10-CM

## 2024-09-17 NOTE — LETTER
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 SUICIDE RISK ASSESSMENT-  Risk factors for self-harm:  previous suicide attempt, recent symptom worsening, relationship conflict, significant pain, and new/ worsening medical issue. Mitigating factors:  no plan or intent, describes a safety plan, h/o seeking help , recent symptom improvement, and stable housing.  The patient does not appear to be at imminent risk for self-harm, hospitalization is not recommended which the pt does  agree to. No hospitalization will be arranged. Based on degree of symptoms day treatment was recommended which the pt does  agree to. Additional steps to minimize risk: address pain.  Safety Plan placed in Pt Instructions: Yes.  Rate SI-desire: 1/5.    TREATMENT RISK STATEMENT:  The risks, benefits, alternatives and potential adverse   effects have been discussed and are understood by the pt. The pt understands the risks of   using street drugs or alcohol. There are no medical contraindications, the pt agrees to   treatment with the ability to do so. The pt knows to call the clinic for any problems or to   access emergency care if needed.  Medical and substance use concerns are documented   above.  Psychotropic drug interaction check was done, including changes made today.      ATTENDING PHYSICIAN:  Arnaldo Varela,  MD    Physician Attestation  I, Arnaldo Varela MD, saw this patient and agree with the findings and plan of care as documented in the note.      Items personally reviewed/procedural attestation: I was present for and supervised the entire  procedure.      On day of service, time spent was    10 min on chart review  30 min with patient  10 min on note writing and follow up messages       Answers submitted by the patient for this visit:  Patient Health Questionnaire (Submitted on 9/16/2024)  If you checked off any problems, how difficult have these problems made it for you to do your work, take care of things at home, or get along with other people?: Very difficult  PHQ9 TOTAL SCORE: 16      Again, thank you for allowing me to participate in the care of your patient.        Sincerely,        Arnaldo Varela MD

## 2024-09-17 NOTE — PATIENT INSTRUCTIONS
"Today's Recommendations:  - I generally agree that with ECT working, we shouldn't change the plan. We may want to explore VNS if we can get it covered for you, but that is not possible right now, and I do not think ketamine is going to do better.  - There is absolutely no interference between ECT and your planned shoulder surgery. Your shoulders do not move during the procedure. You are clear ot proceed.  - We can meet in a couple months, probably in 2025, to see if we need any changes to this plan.  - In the meantime, I think it would be reasonable to try Auvelity the other new antidepressant you haven't tried.      We are specifically a university and research clinic, and there are often research studies ongoing. Some of those are clinical trials of new brain stimulation treatments. Others are what we would call \"biomarker\" studies, where we ask you to participate in some kind of measurement while you are undergoing regular treatment.   If you are interested in hearing about research consider signing up for our research registry. This will allow researchers in our clinic to reach out if you become eligible for a study. Sign up today at [email or web address]    In some cases, a clinical trial is the only way to get access to an advanced treatment that is not covered by your insurance. Usually, we will talk about this in your visit if it is an option.      Patient Education       General Information:  Our Treatment-Resistant Disorders/Interventional Psychiatry clinic is what is often called a \"consultation\" or \"tertiary care\" clinic. That means we do not see patients long-term for medication management or talk therapy. We offer thorough evaluations, recommendations about medications/therapies you may have not yet tried, and in some cases, brain stimulation or office-based treatments. If you are likely to benefit from one of those advanced treatments, we will have talked about it today. Once that treatment " is complete, we will see you once or twice afterwards to check in, and then you will return to getting ongoing psychiatric care from whomever you were seeing before you came for your evaluation with us. If for some reason you no longer have access to that clinician, we can help with a referral to our main [facility]. The only patients we see long-term are patients with implanted medical devices that require ongoing monitoring and programming.     Our recommendations almost always include some kind of cognitive-behavioral therapy (CBT) if you are not getting it already. Brain and nerve stimulation treatments work best when combined with certain talk therapies. We make these recommendations because we strongly believe that, without the therapy piece, most people will not get better, or will have limited clinical benefit. It is often difficult or inconvenient to add therapy to an already busy schedule, but it is also necessary.    It is important to remember that, like all mental health treatments, our interventional therapies are not perfect. About one third of people will not feel better after treatment, and even when they work, they do not take away the symptoms entirely.If we have recommended something above, it means we think there is a better than even chance of it working, but things are never guaranteed. This is another reason we usually recommend CBT along with our advanced treatments -- it can address the symptoms that remain after the stimulation/ketamine treatment.       While we are waiting to implement the recommendations you and I have discussed, you should know what to do if your symptoms worsen. A variety of crisis numbers/resources are available. They include:    CRISIS GENERAL NUMBERS   9-630-LSXYDKA (1-714.789.2423)  OR  910     CRISIS INTERVENTION TEAM (CIT) - this is a POLICE UNIT, specially trained.  This website has information for all of [place]'s CITs. Lays out which areas  have this team.  Http://cit.[email or web address]/[email or web address].php  However, one can just call 911 and ask for this special team.   If police need to be called, DO ask for this team.    CRISIS MOBILE TEAMS  [and see end of this phrase*]  [facility] -COPE: 24hrs/7days:    [phone]  (Adults > 19yo)    [phone]  (Child   < 16yo)                                       FRONT DOOR (during the day could call)   [phone]    [facility] -[phone] (Adult)  [phone] (Child)     [facility] -[phone] (Adult and Child)     [facility] [place] -[phone] ([employer] mobile crisis team; Adult and Child; 24hr)     [facility] -[phone] (Adult and Child)     CRISIS HOUSING  [facility]                           [address]              [phone]  [facility]                                [address]                       [phone]  [facility]                               [address]     [phone]   [facility] Crisis Residence  [address]                [phone]    [facility] EMERGENCY NUMBERS    Crisis Connection:                                [phone]  [facility]:     [phone]  Crisis Intervention:                                [phone] or [phone]   COPE: Mobile Team 24hrs/7days:    [phone]  (Adults > 19yo)                                                                            [phone]  (Child   < 16yo)  [facility]        [phone] 24/7 line and Mobile Team   [address] [place] [address]  DROP IN:  M-F: 8:00 am - 7:00 pm  Sat: 11:00 am - 3:00 pm   Sun: Closed     WALK-IN COUNSELING:  [facility]       [phone]    [address]:   M, W, F:  1:00-3:00PM    M-Th:  6:30-8:30PM    TRANS and LGBT  Call  "Trans [place]line at [phone]. This service is staffed by trans people .   LGBT youth <24 contemplating suicide, call the [employer] Lifeline 8-870-8794.    POISON CONTROL CENTER  [phone]    OR  go to nearest ER    CHILD  \"[facility] has a needs assessment team who will do an intake evaluation. Based on the results of the intake they will recommended inpatient admission, partial hospitalization, intensive outpatient or outpatient care. The number is [phone]. \"    CRISIS TEXT LINE  Http://www.crisistextline.org  FREE SUPPORT AT YOUR FINGERTIPS,   Crisis Text Line serves anyone, in any type of crisis, providing access to free,  support and information via the medium people already use and trust: text. Here s how it works:  1)  Text 890955 from anywhere in the [place], anytime, about any type of crisis.  2)  A live, trained Crisis Counselor receives the text and responds quickly.      The volunteer Crisis Counselor will help you move from a 'hot moment to a cool moment'    [facility] EMERGENCY NUMBERS    Crisis Connection:                                [phone]  [facility]:              [phone]  Crisis Intervention:                                [phone] or [phone]   COPE: Mobile Team 24hrs/7days:    [phone]  (Adults > 19yo)                                                                            [phone]  (Child   < 18yo)  [facility]        [phone]  CALL 24 hours a day.  [address] [place] [address]  DROP IN:  M-F: 8:00 am - 7:00 pm  Sat: 11:00 am - 3:00 pm   Sun: Closed    WALK-IN COUNSELIN)  [facility]                                  [phone]        [facility] [address] [place] [address] Ave:                  BUDDY, W:      5:00-7:00PM       2)  [facility]                            [phone]        [place], [address]                T, Th:      6:00-8:00PM    TRANS and " "LGBT  Call Cnekt at [phone]. This service is staffed by trans people 24/7.   LGBT youth <24 contemplating suicide, call the [employer] Lifeline 2-709-1475.    POISON CONTROL CENTER  [phone]    OR  go to nearest ER    CHILD  \"[facility] has a needs assessment team who will do an intake evaluation. Based on the results of the intake they will recommended inpatient admission, partial hospitalization, intensive outpatient or outpatient care. The number is [phone] or [phone]. \"    CRISIS TEXT LINE  Http://www.crisistextline.org  FREE SUPPORT AT YOUR FINGERTIPS, 24/7  Crisis Text Line serves anyone, in any type of crisis, providing access to free, 24/7 support and information via the medium people already use and trust: text. Here s how it works:  1)  Text [phone] from anywhere in the [place], anytime, about any type of crisis.  2)  A live, trained Crisis Counselor receives the text and responds quickly.      The volunteer Crisis Counselor will help you move from a 'hot moment to a cool moment'      * A Community Paramedic (CP) is an advanced paramedic that works to increase access to primary and preventive care and decrease use of emergency departments, which in turn decreases health care costs. Among other things, CPs may play a key role in providing follow-up services after a hospital discharge to prevent hospital readmission. CPs can provide health assessments, chronic disease monitoring and education, medication management, immunizations and vaccinations, laboratory specimen collection, hospital discharge follow-up care and minor medical procedures. CPs work under the direction of an Ambulance Medical Director.   "

## 2024-09-17 NOTE — PROGRESS NOTES
Interventional Psychiatry Program   Treatment Resistant Depression (TRD) Group  [facility], [facility]        Patient: Randi Cleary (1955)    MRN: [id number]  Date of Treatment: September 17, 2024  Duration of Treatment: Start Time: 10:30 am; End Time: 12:00  Providers/Group Facilitators: Daniella Gaona, PhD, LP; Valdo Harper MA (PhD student)     Number of Participants: 7  Group Format: In Person     People present:   Providers: Daniella Gaona and Valdo Harper  Patient: Randi Cleary  Others: Other patients    Encounter Diagnoses   Name Primary?    Severe episode of recurrent major depressive disorder, without psychotic features (H) Yes    Generalized anxiety disorder        Assessment (current symptoms):       9/9/2024     1:30 PM 9/12/2024     8:49 AM 9/16/2024    12:47 PM   PHQ   PHQ-9 Total Score 15 13 16    16   Q9: Thoughts of better off dead/self-harm past 2 weeks Several days Several days Several days   F/U: Thoughts of suicide or self-harm Yes Yes Yes   F/U: Self harm-plan No No No   F/U: Self-harm action No No No   F/U: Safety concerns No No No         7/1/2024    12:10 PM 7/17/2024     9:55 AM 9/9/2024     1:33 PM   CHAVO-7 SCORE   Total Score  10 (moderate anxiety) 13 (moderate anxiety)   Total Score 11 10 13     The treatment resistant depression (TRD) group is based on the cognitive behavioral therapy model that uses psychoeducation, behavioral activation, mindfulness, supportive techniques, problem solving techniques, and social skills.       Inclusion criteria for group participation: Current patient in the [facility] TRD program; agreement to group format and guidelines discussed in first session     Session 2 Content:   Mindfulness Exercise on Suffering / Acceptance  Debriefed mindfulness exercise including the concept of kindness and self-compassion  Processed group's reaction to last session and the idea of a time-limited TRD  group  Processed topics of Transitions, Grief and Loss using the example of time-limited TRD     Description: Winnie shared that the idea of discontinuation of group therapy felt like abandonment. She reported that the familiarity and bond with other group members felt good to her.    Patient's reaction to treatment strategies: positively engaged in group, volunteering personal information.     Patient's progress toward treatment goals: Pt expressed commitment to working in group therapy.     Planned activity: Did not have time for activity planning in this session.    Mental Status Exam:  Alertness: alert and oriented  Appearance: well groomed  Behavior/Demeanor: cooperative and pleasant, with good eye contact   Speech: normal  Language: good. Preferred language identified as English.  Psychomotor: normal or unremarkable  Mood: depressed  Affect: restricted; was congruent to mood; was congruent to content  Thought Process/Associations: unremarkable  Perception: Reports intact    Insight: appears appropriate  Judgment: appears appropriate  Cognition: does appear grossly intact    Valdo Harper M.A.   Practicum Therapist    I co-facilitated (and was present for the entire duration of) this group therapy session with the above trainee and discussed this participant in individual supervision. I agree with the note and plan as documented.     Supervisor: Daniella Gaona, PhD, LP      Outpatient Treatment Plan     Today's Date: Sep 17, 2024              Date of 1st group meetin/10/24       Diagnosis:  Encounter Diagnoses   Name Primary?    Severe episode of recurrent major depressive disorder, without psychotic features (H) Yes    Generalized anxiety disorder      Current symptoms and circumstances that substantiate the diagnosis:  Persistent dysphoria, anhedonia, sleep disturbance, low energy/fatigue, trouble concentrating, and interfering anxiety and irritability.     How symptoms and/or behaviors are affecting level of  function:  Reduced social engagement/social isolation; minimal physical activity    Risk Assessment:  Suicide:  Assessed Level of Immediate Risk: Low  Ideation: endorsed thoughts of suicide or self-harm  Denies self-harm action  Plan:  denies plan for suicide and self-harm  Safety: denied safety concerns     Homicide/Violence:  Assessed Level of Immediate Risk: Low  Ideation: Denies  Plan: n/a  Means: No  Intent: No          SYMPTOMS; PROBLEMS   MEASURABLE GOALS;    FUNCTIONAL IMPROVEMENT INTERVENTIONS Gains Made:     Depression reduce depressive symptoms, reduce depressive episodes, and report feeling more positive about self   Increase engagement with value-driven activities BA group farhan Pt has been actively engaged in group and used excellent social skills to connect with other group members     Frequency of Sessions: Weekly    Discharge and Aftercare Goals: see measurable goals above  Expected duration of treatment: 8 weeks  Participants in therapy plan (family, friends, support network): self     Patient verbally consented to the treatment plan above.

## 2024-09-18 ENCOUNTER — VIRTUAL VISIT (OUTPATIENT)
Dept: PSYCHOLOGY | Facility: CLINIC | Age: 69
End: 2024-09-18
Payer: MEDICARE

## 2024-09-18 ENCOUNTER — VIRTUAL VISIT (OUTPATIENT)
Dept: SURGERY | Facility: CLINIC | Age: 69
End: 2024-09-18
Payer: MEDICARE

## 2024-09-18 VITALS — HEIGHT: 66 IN | WEIGHT: 170 LBS | BODY MASS INDEX: 27.32 KG/M2

## 2024-09-18 DIAGNOSIS — F33.2 MAJOR DEPRESSIVE DISORDER, RECURRENT SEVERE WITHOUT PSYCHOTIC FEATURES (H): ICD-10-CM

## 2024-09-18 DIAGNOSIS — Z12.11 COLON CANCER SCREENING: ICD-10-CM

## 2024-09-18 DIAGNOSIS — Z01.818 PREOP EXAMINATION: Primary | ICD-10-CM

## 2024-09-18 DIAGNOSIS — F41.1 GENERALIZED ANXIETY DISORDER: Primary | ICD-10-CM

## 2024-09-18 PROCEDURE — 90837 PSYTX W PT 60 MINUTES: CPT | Mod: 95 | Performed by: MARRIAGE & FAMILY THERAPIST

## 2024-09-18 PROCEDURE — 99215 OFFICE O/P EST HI 40 MIN: CPT | Mod: 95 | Performed by: PHYSICIAN ASSISTANT

## 2024-09-18 ASSESSMENT — PAIN SCALES - GENERAL: PAINLEVEL: NO PAIN (0)

## 2024-09-18 ASSESSMENT — ENCOUNTER SYMPTOMS: SEIZURES: 0

## 2024-09-18 ASSESSMENT — LIFESTYLE VARIABLES: TOBACCO_USE: 1

## 2024-09-18 ASSESSMENT — COPD QUESTIONNAIRES: COPD: 1

## 2024-09-18 NOTE — TELEPHONE ENCOUNTER
Pre visit planning completed.    ~per cardiologist , HOLD off on procedure until after cardiac catheterization. This is on 9/25     Procedure details:    Patient scheduled for Colonoscopy on 10/1/2024.     Arrival time: 0945. Procedure time 1115    Facility location: [facility]; [address], [place] [address]. Check in location: Main entrance at registration desk.    Sedation type: MAC    Pre op exam needed? Yes. PAC eval scheduled on 9/18 due to pulmonary hypertension.    Indication for procedure: Colon cancer screening [Z12.11]       Chart review:     Electronic implanted devices? No    Recent diagnosis of diverticulitis within the last 6 weeks? No      Medication review:    Diabetic? Yes. Not on diabetic medication    Anticoagulants? No    Weight loss medication/injectable? No GLP-1 medication per patient's medication list.  RN will verify with pre-assessment call.    Other medication HOLDING recommendations:  Cannabis/Marijuana: Stop night before procedure.      Prep for procedure:     Bowel prep recommendation: Extended Golytely. Bowel prep prescription sent to    [id number] [address]  Due to: chronic pain medication noted in chart.     Prep instructions sent via bambi Barrett RN  Endoscopy Procedure Pre Assessment RN  [phone] option 2

## 2024-09-18 NOTE — PROGRESS NOTES
Winnie is a 69 year old who is being evaluated via a billable video visit.    How would you like to obtain your AVS? Mail a copy  If the video visit is dropped, the invitation should be resent by: Text to cell phone: [phone]    Subjective   Winnie is a 69 year old, presenting for the following health issues:  Pre-Op Exam      HPI         Physical Exam

## 2024-09-18 NOTE — PATIENT INSTRUCTIONS
Name:  Randi Cleary   MRN:  [id number]   :  1955   Today's Date:  2024     GI Lab procedures:    A representative from the GI Lab will contact you regarding arrival date and time.      You were seen today in the PAC Clinic.   (Pre-operative Anesthesia Assessment Center)  [place] [address]   phone [phone]    You had a pre-operative assessment done.    Anesthesia recommendations for medications:    Hold Aspirin for 2 days before procedure.  Hold Multivitamins for 7 days before procedure.   Hold Herbal medications and Supplements for 7 days before procedure.  Hold Ibuprofen for 2 days before procedure.   Hold Naproxen for 2 days before procedure.     No alcohol or cannabis products for 24 hours before your procedure        Please hold the following medications the day of procedure:    Vitamins B12, C, D3, edecrin, mucinex, klor-con, magnesium.      Please take these medications the day of procedure:    Tylenol if needed; take allopurinol, prilosec, synthroid, viibryd (vilazodone).    Bring inhalers to procedure if currently using them.      For questions or appointments, call:  [facility]: [phone], option 2.  Monday through Friday, 8 a.m. to 4:30 p.m.  (If it is after hours, please reach out to the clinic or provider that scheduled your appointment)

## 2024-09-18 NOTE — H&P
Pre-Operative H & P     CC:  Preoperative exam to assess for increased cardiopulmonary risk while undergoing surgery and anesthesia.    Date of Encounter: 9/18/2024  Primary Care Physician:  Kaushik Hobbs     Reason for visit:   Encounter Diagnoses   Name Primary?    Colon cancer screening Yes    Preop examination        HPI  Randi Cleary is a 69 year old female who presents for pre-operative H & P in preparation for  Procedure Information       Case: [id number] Date/Time: 10/01/24 1115    Procedure: Colonoscopy (Anus)    Anesthesia type: MAC    Diagnosis: Colon cancer screening [Z12.11]    Pre-op diagnosis: Colon cancer screening [Z12.11]    Location:  [facility] /  GI    Providers: Guru Elke Tolbert MD            Patient is being evaluated for comorbid conditions of HTN, pulmonary artery HTN, KYAW, COPD, asthma, anxiety, depression s/p ECT, borderline personality, ETOH abuse in remission, Graves s/p ablation, hypothyroidism, h/o pheochromocytoma s/p left adrenalectomy 2017, GERD, hiatal hernia, fatty liver, gout, h/o breast cancer, psoriasis, opioid use, hereditary hemochromatosis.     Ms. Cathy Cleary presents for routine colon cancer screening.    History is obtained from the patient and chart review    Hx of abnormal bleeding or anti-platelet use: denies    Menstrual history: No LMP recorded (lmp unknown). Patient is postmenopausal.:      Past Medical History  Past Medical History:   Diagnosis Date    Bipolar 2 disorder (H)     Breast cancer (H) 1986    lumpectomy, radiation, chemo    Chronic pain syndrome     COPD (chronic obstructive pulmonary disease) (H)     asthma    Cord compression (H) 12/21/2021    Dizzy     Drug tolerance     opioid    Esophageal reflux     Fatigue     Generalized anxiety disorder     Graves disease 1994    Hemochromatosis 02/14/2018    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; H63D not detected    History of breast cancer 08/28/2020    Formatting of this note might be different  from the original. Created by Conversion  Replacement Utility updated for latest IMO load Formatting of this note might be different from the original. Created by Conversion  Replacement Utility updated for latest IMO load    History of corticosteroid therapy 11/19/2019    History of partial adrenalectomy (H24) 11/19/2019    History of pheochromocytoma 11/19/2019    Hx antineoplastic chemotherapy     Hx of radiation therapy     Hyperlipidaemia     Hypertension     Impaired fasting glucose 2017    Injury of neck, whiplash 07/15/2021    Joint pain     KYAW (obstructive sleep apnea) 2016    Osteopenia     Pheochromocytoma, left 08/02/2017    laparoscopically removed    Postablative hypothyroidism 1995    Prediabetes 10/03/2019    by A1c    Psoriasis     Psoriatic arthropathy (H)     Pulmonary hypertension (H)     Right rotator cuff tear     RLS (restless legs syndrome)     on ropinorole    Sacroiliitis (H24)     Serotonin syndrome 08/28/2020    [facility] - While on desvenlafaxine 100mg    Snoring     Spinal stenosis     Status post coronary angiogram 10/03/2019    Urinary incontinence     Vitamin B 12 deficiency 2009    Vitamin D deficiency 2010       Past Surgical History  Past Surgical History:   Procedure Laterality Date    ARTHRODESIS ANKLE      ARTHROPLASTY ANKLE Right 6/29/2015    Procedure: ARTHROPLASTY ANKLE;  Surgeon: Jason Coughlin MD;  Location: [facility]    ARTHROPLASTY REVISION ANKLE Right 6/29/2015    Procedure: ARTHROPLASTY REVISION ANKLE;  Surgeon: Jason Coughlin MD;  Location: [facility]    BIOPSY BREAST      BREAST BIOPSY, CORE RT/LT      COLONOSCOPY      COLONOSCOPY N/A 2/25/2021    Procedure: COLONOSCOPY;  Surgeon: Guru Elke Tolbert MD;  Location:  GI    CV CORONARY ANGIOGRAM N/A 10/3/2019    Procedure: CV CORONARY ANGIOGRAM;  Surgeon: Bryce Pierre MD;  Location:  HEART CARDIAC CATH LAB    CV RIGHT HEART CATH MEASUREMENTS RECORDED N/A 10/3/2019     Procedure: CV RIGHT HEART CATH;  Surgeon: Bryce Pierre MD;  Location:  HEART CARDIAC CATH LAB    ESOPHAGOSCOPY, GASTROSCOPY, DUODENOSCOPY (EGD), COMBINED N/A 2/25/2021    Procedure: ESOPHAGOGASTRODUODENOSCOPY, WITH BIOPSY;  Surgeon: Guru Elke Tolbert MD;  Location:  GI    EYE SURGERY  2021    HC REMOVE TONSILS/ADENOIDS,<13 Y/O      Description: Tonsillectomy With Adenoidectomy;  Recorded: 04/07/2010;    IR LUMBAR EPIDURAL STEROID INJECTION  10/26/2004    IR LUMBAR EPIDURAL STEROID INJECTION  11/16/2004    IR LUMBAR EPIDURAL STEROID INJECTION  12/21/2004    IR LUMBAR EPIDURAL STEROID INJECTION  6/8/2006    JOINT REPLACEMENT      LAMINOPLASTY CERVICAL POSTERIOR THREE+ LEVELS Left 12/21/2021    Procedure: CERVICAL 3-CERVICAL 6 LEFT OPEN DOOR LAMINOPLASTY AND LEFT CERVICAL 4-5 AND CERVICAL 6-7 POSTERIOR FORAMINOTOMY;  Surgeon: Angela Gregory MD;  Location: [facility]    LAPAROSCOPIC ADRENALECTOMY Left 08/02/2017    pheochromocytoma    LAPAROSCOPIC ADRENALECTOMY Left 8/2/2017    Procedure: LAPAROSCOPIC LEFT ADRENALECTOMY, ;  Surgeon: Gab Linares MD;  Location: [facility] [place];  Service:     LENGTHEN TENDON ACHILLES Right 6/29/2015    Procedure: LENGTHEN TENDON ACHILLES;  Surgeon: Jason Coughlin MD;  Location: [facility]    LUMPECTOMY BREAST      LUMPECTOMY BREAST Left 1994    MAMMOPLASTY REDUCTION Right 01/13/2015    [place]    MAMMOPLASTY REDUCTION Right     approx late 2015/early2016    MASTECTOMY      left lumpectomy with axillary node dissection    MASTECTOMY MODIFIED RADICAL      OTHER SURGICAL HISTORY Right     reconstructive breast surgery    OTHER SURGICAL HISTORY      Adrenalectomy for pheochromocytoma    HI MASTECTOMY, MODIFIED RADICAL      Description: Modified Radical Mastectomy Left Breast;  Recorded: 04/07/2010;    REPAIR HAMMER TOE Right 6/29/2015    Procedure: REPAIR HAMMER TOE;  Surgeon: Jason Coughlin MD;  Location: [facility]    TONSILLECTOMY       TONSILLECTOMY & ADENOIDECTOMY      [facility] ARTHRODESIS,ANKLE,OPEN Right     Description: Ankle Arthrodesis;  Recorded: 04/07/2010;       Prior to Admission Medications  Current Outpatient Medications   Medication Sig Dispense Refill    albuterol (VENTOLIN HFA) 108 (90 Base) MCG/ACT inhaler Inhale 2 puffs into the lungs every 6 hours as needed for shortness of breath, wheezing or cough 18 g 11    allopurinol (ZYLOPRIM) 300 MG tablet Take 1 tablet (300 mg) by mouth every morning      Cyanocobalamin (VITAMIN B-12) 5000 MCG SUBL Place 2-3 sprays under the tongue daily Unknown dose. 2 or 3 sprays/day      ethacrynic acid (EDECRIN) 25 MG tablet Half pill every day (Patient taking differently: Take 0.5 tablets by mouth every morning. Half pill every day) 45 tablet 3    Fluticasone-Umeclidin-Vilanterol (TRELEGY ELLIPTA) 200-62.5-25 MCG/ACT oral inhaler Inhale 1 puff into the lungs daily. (Patient taking differently: Inhale 1 puff into the lungs every morning.) 60 each 11    gabapentin (NEURONTIN) 100 MG capsule Take 1 capsule (100 mg) by mouth every 6 hours as needed (anxiety) (Patient taking differently: Take 300 mg by mouth every 6 hours as needed (anxiety).) 120 capsule 1    gabapentin (NEURONTIN) 400 MG capsule Take 1 capsule (400 mg) by mouth at bedtime 30 capsule 1    guaiFENesin (MUCINEX) 600 MG 12 hr tablet Take 600 mg by mouth every morning.      KLOR-CON M20 20 MEQ CR tablet Take 1 tablet (20 mEq) by mouth every morning. 90 tablet 3    MAGNESIUM PO Take 1 capsule by mouth 2 times daily Strength unknown      medical cannabis (Patient's own supply) See Admin Instructions (The purpose of this order is to document that the patient reports taking medical cannabis.  This is not a prescription, and is not used to certify that the patient has a qualifying medical condition.)  Flower      melatonin 3 MG tablet Take 1 tablet (3 mg) by mouth nightly as needed for sleep 30 tablet 1    omeprazole (PRILOSEC) 20 MG DR capsule Take 1  capsule (20 mg) by mouth every morning 90 capsule 3    oxyCODONE (ROXICODONE) 5 MG tablet Take 1 tablet (5 mg) by mouth 5 times daily for 14 days. May dispense/start 5/8/24 70 tablet 0    rOPINIRole (REQUIP) 2 MG tablet Take 1 tablet (2 mg) by mouth 3 times daily One tab 3 pm, one bedtime, and one during night on waking 270 tablet 3    SYNTHROID 150 MCG tablet Take 1 tablet (150 mcg) by mouth every morning MON to SAT 1 tablet/day; SUN 0.5 tablet      UNABLE TO FIND Take 1 tablet by mouth every morning. MEDICATION NAME: CETYL-MYRISFOLEATE      vilazodone (VIIBRYD) 10 MG TABS tablet Take 1 tablet (10 mg) by mouth daily with food (Patient taking differently: Take 10 mg by mouth daily (with breakfast).) 30 tablet 0    vitamin C (ASCORBIC ACID) 1000 MG TABS Take 1,000 mg by mouth daily      vitamin D3 (CHOLECALCIFEROL) 250 mcg (85617 units) capsule Take 1 capsule by mouth once a week. SUNDAY'S      bisacodyl (DULCOLAX) 5 MG EC tablet Two days prior to exam take two (2) tablets at 4pm. One day prior to exam take two (2) tablets at 4pm 4 tablet 0    naloxone (NARCAN) 4 MG/0.1ML nasal spray Spray 1 spray (4 mg) into one nostril alternating nostrils as needed for opioid reversal every 2-3 minutes until assistance arrives 0.2 mL 0    polyethylene glycol (GOLYTELY) 236 g suspension Two days before procedure at 5PM fill first container with water. Mix and drink an 8 oz glass every 15 minutes until HALF of the container is gone. Place the remainder in the refrigerator. One day before procedure at 5PM drink second half of bowel prep. Drink an 8 oz glass every 15 minutes until it is gone. Day of procedure 6 hours before arrival time fill the 2nd container with water. Mix and drink an 8 oz glass every 15 minutes until HALF of the container is gone. Discard the remaining solution. 8000 mL 0    STATIN NOT PRESCRIBED (INTENTIONAL) Please choose reason not prescribed from choices below.      traZODone (DESYREL) 50 MG tablet Take 0.5-1  "tablets (25-50 mg) by mouth nightly as needed for sleep. 30 tablet 0       Allergies  Allergies   Allergen Reactions    Serotonin Reuptake Inhibitors (Ssris) Anxiety, Difficulty breathing, Headache, Palpitations and Shortness Of Breath    Buspirone      The patient states she had serotonin syndrome    Cephalexin      Other reaction(s): unknown rxn.    Desvenlafaxine      Serotonin syndrome    Diclofenac Sodium [Diclofenac]      Serotonin syndrome and restless legs syndrome    Gabapentin      Drove on the wrong side of the highway    Levofloxacin      \"CAN'T REMEMBER\"    Penicillins      \"SORES IN MOUTH\"    Riluzole Difficulty breathing and Swelling    Sulfa Antibiotics      \"PT DOES NOT KNOW WHAT THE REACTION WAS\"    Topiramate Other (See Comments)     Frequent urination       Social History  Social History     Socioeconomic History    Marital status:      Spouse name: Not on file    Number of children: Not on file    Years of education: Not on file    Highest education level: Not on file   Occupational History    Not on file   Tobacco Use    Smoking status: Former     Current packs/day: 0.00     Average packs/day: 2.5 packs/day for 29.2 years (72.9 ttl pk-yrs)     Types: Cigarettes     Start date: 1971     Quit date: 2000     Years since quittin.2     Passive exposure: Past    Smokeless tobacco: Never   Vaping Use    Vaping status: Never Used   Substance and Sexual Activity    Alcohol use: Not Currently     Comment: relapse 2021 sober     Drug use: Yes     Types: Marijuana     Comment: medical    Sexual activity: Not on file   Other Topics Concern    Parent/sibling w/ CABG, MI or angioplasty before 65F 55M? Yes   Social History Narrative    Not on file     Social Determinants of Health     Financial Resource Strain: High Risk (2023)    Financial Resource Strain     Within the past 12 months, have you or your family members you live with been unable to get utilities (heat, electricity) " when it was really needed?: Yes   Food Insecurity: High Risk (2023)    Food Insecurity     Within the past 12 months, did you worry that your food would run out before you got money to buy more?: Yes     Within the past 12 months, did the food you bought just not last and you didn t have money to get more?: Yes   Transportation Needs: High Risk (2023)    Transportation Needs     Within the past 12 months, has lack of transportation kept you from medical appointments, getting your medicines, non-medical meetings or appointments, work, or from getting things that you need?: Yes   Physical Activity: Not on file   Stress: Not on file   Social Connections: Not on file   Interpersonal Safety: Low Risk  (2024)    Interpersonal Safety     Do you feel physically and emotionally safe where you currently live?: Yes     Within the past 12 months, have you been hit, slapped, kicked or otherwise physically hurt by someone?: No     Within the past 12 months, have you been humiliated or emotionally abused in other ways by your partner or ex-partner?: No   Housing Stability: High Risk (2023)    Housing Stability     Do you have housing? : Yes     Are you worried about losing your housing?: Yes       Family History  Family History   Problem Relation Age of Onset    Obesity Mother     Thyroid Disease Mother     Arthritis Mother     Hypertension Mother     Osteoporosis Mother     Hemochromatosis Father     Myocardial Infarction Father     Snoring Father     Heart Disease Father     Obesity Father     Coronary Artery Disease Father          at age 53 of heart attack    Hypertension Father     Genetic Disorder Father         Hemachromatosis    Lupus Sister     Hypertension Sister     Obesity Sister     Scleroderma Sister     Heart Failure Sister     Hemochromatosis Sister     Obesity Sister     Cardiovascular Sister     Hypertension Sister     Arthritis Sister     Hemochromatosis Sister     Lupus Sister      Scleroderma Sister     Coronary Artery Disease Sister     Genetic Disorder Sister         Lupus, Hemachromatosis    Hemochromatosis Brother     Hypertension Brother     Alcoholism Brother     Substance Abuse Brother     Cardiovascular Brother     Hypertension Brother     Arthritis Brother     Hemochromatosis Brother     Prostate Cancer Brother     Genetic Disorder Brother         Hemachromatosis    Obesity Brother     Cardiovascular Maternal Grandmother     Coronary Artery Disease Maternal Grandmother     Osteoporosis Maternal Grandmother     Alcoholism Maternal Grandfather     Cerebrovascular Disease Paternal Grandmother     Mental Illness Maternal Uncle     Adrenal Disorder No family hx of     Breast Cancer No family hx of     Anesthesia Reaction No family hx of     Deep Vein Thrombosis (DVT) No family hx of        Review of Systems  The complete review of systems is negative other than noted in the HPI or here.     Anesthesia Evaluation   Pt has had prior anesthetic.     History of anesthetic complications   Pt reports significant anxiety w/ prior procedure when she was paralyzed prior to being sedated.    ROS/MED HX  ENT/Pulmonary:     (+) sleep apnea (stopped using CPAP due to panic attacks), doesn't use CPAP,              tobacco use (Quit 2000 (72 pk yr hx)), Past use,         COPD (Uses Trelegy Ellipta daily),           (-) recent URI   Neurologic: Comment: RLS     (-) no seizures and no CVA   Cardiovascular:     (+)  hypertension- -   -  - -                                pulmonary hypertension, Previous cardiac testing   Echo: Date: 10/5/23 Results:  See H&P  Stress Test:  Date: 2021 Results:     There is no prior study for comparison.     Pharmacological regadenoson stress ECG is negative for inducible myocardial ischemia.     Pharmacological regadenoson nuclear stress test is probably abnormal. There is a very small sized induced myocardial ischemia in distal anterolateral segment.  There is no previous  myocardial infarction.     Normal left ventricular size, wall motion and systolic function.  The calculated left ventricular ejection fraction is 68%.     The patient is at a low risk of future cardiac ischemic events.    ECG Reviewed:  Date: Results:    Cath:  Date: 2019 Results:    Right sided filling pressures are moderately elevated.    Moderately elevated pulmonary artery hypertension.    Left sided filling pressures are mildly elevated.    Normal cardiac output level.     1. Normal coronary arteries.    (Patient is scheduled for right heart cath on 9/25/24)    METS/Exercise Tolerance: 3 - Able to walk 1-2 blocks without stopping Comment: Uses cane due to dizziness in setting of c-spine stenosis. Uses stairs multiple times daily; able to walk >2 blocks.   Hematologic: Comments: Hereditary hemochromatosis; baseline Hgb 17   (-) history of blood clots and history of blood transfusion   Musculoskeletal: Comment: S/p right ankle arthroplasty    Gout    S/p cervical laminoplasty 2021        GI/Hepatic:     (+) GERD, Asymptomatic on medication,           liver disease (fatty liver),       Renal/Genitourinary:  - neg Renal ROS  (-) renal disease   Endo: Comment: Hx pheochromocytoma, s/p left adrenalectomy 2017      (+)          thyroid problem, hypothyroidism Graves, s/p ablation,        (-) Type II DM   Psychiatric/Substance Use: Comment: Currently undergoing ECT    Borderline personality        (+) psychiatric history anxiety and depression  H/O chronic opiod use .     Infectious Disease:  - neg infectious disease ROS     Malignancy:   (+) Malignancy (~ 30 yrs ago), History of Breast.Breast CA status post Surgery, Chemo and Radiation.      Other: Comment: Psoriasis             Virtual visit -  No vitals were obtained    Physical Exam  Constitutional: Awake, alert, cooperative, no apparent distress, and appears stated age.  HENT: Normocephalic  Respiratory: non labored breathing   Neurologic: Awake, alert, oriented  to name, place and time.   Neuropsychiatric: Calm, cooperative. Normal affect.      Prior Labs/Diagnostic Studies   All labs and imaging personally reviewed     Heart cath/ stress test -  please see in ROS above       10/5/23 Echo result w/o MOPS: Interpretation SummaryGlobal and regional left ventricular function is normal with an EF of 55-60%with normal size and thickness. Global right ventricular function and size are normal. Trace to mild aortic insufficiency is present. No pericardial effusion is present. This study was compared with the study from 8/13/19 .No significant changes noted.           Latest Ref Rng & Units 2/5/2021    11:25 AM   PFT   FVC L 3.14    FEV1 L 2.22    FVC% % 99    FEV1% % 90          The patient's records and results personally reviewed by this provider.         Assessment    Randi Cleary is a 69 year old female seen as a PAC referral for risk assessment and optimization for anesthesia.    Plan/Recommendations  Pt will be optimized for the proposed procedure.  See below for details on the assessment, risk, and preoperative recommendations    NEUROLOGY  - No history of TIA, CVA or seizure  - Chronic Pain,  On chronic opiates, morphine equivilant = 37.5 MME/Day.  -Post Op delirium risk factors:  High co-morbid index    ENT  - No current airway concerns.  Will need to be reassessed day of surgery.  Mallampati: Unable to assess  TM: Unable to assess    CARDIAC  - HTN, hold etacrynic acid DOS  - Pulmonary artery HTN. Followed by Dr. Edwards. Pt is scheduled for right heart cath on 9/25/24 for surveillance.  - Cath 2019:  moderate PAH, normal coronaries  - Stress test 2021:  no ischemia  - Echo 10/5/23:  EF 55-60%, normal function. No change from 2019.    - METS (Metabolic Equivalents)  Uses cane due to dizziness in setting of c-spine stenosis. Uses stairs multiple times daily; able to walk >2 blocks.    Patient CANNOT perform 4 METS exercise without symptoms             Total Score: 1  "   Functional Capacity: Unable to complete 4 METS      RCRI-Very low risk: Class 1 0.4% complication rate             Total Score: 0        PULMONARY  - KYAW, stopped using CPAP due to panic attacks      - COPD, uses Trelegy Ellipta daily.     - Tobacco History    History   Smoking Status    Former    Types: Cigarettes   Smokeless Tobacco    Never    * Quit 2000,  72 pk yr hx    GI  - GERD  Controlled on medications: Proton Pump Inhibitor  PONV High Risk  Total Score: 3           1 AN PONV: Pt is Female    1 AN PONV: Patient is not a current smoker    1 AN PONV: Intended Post Op Opioids          ENDOCRINE    - BMI: Estimated body mass index is 27.44 kg/m  as calculated from the following:    Height as of this encounter: 1.676 m (5' 6\").    Weight as of this encounter: 77.1 kg (170 lb).  Overweight (BMI 25.0-29.9)  - No history of Diabetes Mellitus  - Hx pheochromocytoma, s/p left adrenalectomy 2017  - Hx Graves s/p ablation. Hypothyriodism.    HEME/ ONC  VTE Low Risk 0.26%             Total Score: 1    VTE: Greater than 59 yrs old      - No history of abnormal bleeding or antiplatelet use.    - Hereditary hemochromatosis, followed by Dr. Pablo in heme; last seen 8/22/24.  - Baseline Hgb 17  - Hx breast cancer, s/p surgery & chemoradiation    MSK  -  S/p right ankle arthroplasty  - Gout  - S/p cervical laminoplasty 2021      PSYCH  - Anxiety  - Depression, currently undergoing ECT  - ETOH abuse, sober x2 years      The patient is aware that the final anesthesia plan will be decided by the assigned anesthesia provider on the date of service.      The patient is optimized for their procedure. AVS with information on meds given by nursing staff. No further diagnostic testing indicated.    Please refer to the physical examination documented by the anesthesiologist in the anesthesia record on the day of surgery.    Video-Visit Details    Type of service:  Video Visit    Provider received verbal consent for a Video Visit " from the patient? Yes   Video Start Time:  1137  Video End Time: 1207    Originating Location (pt. Location): Home    Distant Location (provider location):  Off-site  Mode of Communication:  Video Conference via [employer]  On the day of service:     Prep time: 15 minutes  Visit time: 30 minutes  Documentation time: 14 minutes  ------------------------------------------  Total time: 59 minutes      Gina Ramon PA-C  Preoperative Assessment Center  [facility]  Phone: [phone]  Fax: [phone]

## 2024-09-18 NOTE — PROGRESS NOTES
[facility]                                     Progress Note    Patient Name: Randi Cleary  Date: 2024       Service Type: Individual      Session Start Time: 2:30  Session End Time: 3:33     Session Length: 53+    Session #: 4    Attendees: Client    Service Modality:  Video Visit:      Provider verified identity through the following two step process.  Patient provided:  Patient  and Patient address    Telemedicine Visit: The patient's condition can be safely assessed and treated via synchronous audio and visual telemedicine encounter.      Reason for Telemedicine Visit: Patient has requested telehealth visit    Originating Site (Patient Location): Patient's home    Distant Site (Provider Location): Provider Remote Setting- Home Office    Consent:  The patient/guardian has verbally consented to: the potential risks and benefits of telemedicine (video visit) versus in person care; bill my insurance or make self-payment for services provided; and responsibility for payment of non-covered services.     Patient would like the video invitation sent by:  My Chart    Mode of Communication:  Video Conference via Amwell    Distant Location (Provider):  Off-site    As the provider I attest to compliance with applicable laws and regulations related to telemedicine.    DATA  Interactive Complexity: No  Crisis: No        Progress Since Last Session (Related to Symptoms / Goals / Homework):   Symptoms: No change      Homework:  n/a      Episode of Care Goals: Minimal progress - PREPARATION (Decided to change - considering how); Intervened by negotiating a change plan and determining options / strategies for behavior change, identifying triggers, exploring social supports, and working towards setting a date to begin behavior change     Current / Ongoing Stressors and Concerns:   Anticipating two upcoming medical procedures with high anxiety.  Worried about stopping medications for seven days  in anticipation of colonoscopy.  Reflecting on family of origin and the dynamics that continue to impact her currently.     Treatment Objective(s) Addressed in This Session:   Distress tolerance     Intervention:   Relationship building, assessment.  Identifying areas of interest for future work.  Distress tolerance.  Problem solving anxiety and questions about upcoming procedures.    Assessments completed prior to visit:  The following assessments were completed by patient for this visit:  PHQ9:       8/21/2024     7:35 AM 8/27/2024     2:58 PM 9/6/2024    11:53 AM 9/9/2024     1:30 PM 9/12/2024     8:49 AM 9/16/2024    12:47 PM 9/18/2024     2:27 PM   PHQ-9 SCORE   PHQ-9 Total Score MyChart 9 (Mild depression) 10 (Moderate depression) 10 (Moderate depression) 15 (Moderately severe depression) 13 (Moderate depression) 16 (Moderately severe depression) 12 (Moderate depression)   PHQ-9 Total Score 9 10 10 15 13 16    16 12     GAD7:       4/18/2024    12:34 PM 5/15/2024    11:25 AM 6/6/2024     8:00 AM 6/27/2024    12:48 PM 7/1/2024    12:10 PM 7/17/2024     9:55 AM 9/9/2024     1:33 PM   CHAVO-7 SCORE   Total Score 17 (severe anxiety) 9 (mild anxiety)  11 (moderate anxiety)  10 (moderate anxiety) 13 (moderate anxiety)   Total Score 17    17 9    9    9    9 9 11    11    11    11    11 11 10 13     CAGE-AID:       6/22/2020     7:12 PM 1/18/2022    11:15 PM 6/6/2024     8:00 AM   CAGE-AID Total Score   Total Score 4 4 4   Total Score MyChart 4 (A total score of 2 or greater is considered clinically significant) 4 (A total score of 2 or greater is considered clinically significant)      PROMIS 10-Global Health (all questions and answers displayed):       1/22/2024    12:00 PM 5/1/2024    11:53 AM 5/15/2024    11:27 AM 6/6/2024     8:00 AM 7/1/2024    12:10 PM 7/10/2024     9:53 AM 7/17/2024    10:01 AM   PROMIS 10   In general, would you say your health is: Fair Fair Fair   Fair Fair   In general, would you say your  quality of life is: Poor Poor Poor   Good Poor   In general, how would you rate your physical health? Fair Fair Fair   Fair Fair   In general, how would you rate your mental health, including your mood and your ability to think? Poor Fair Fair   Fair Fair   In general, how would you rate your satisfaction with your social activities and relationships? Poor Fair Poor   Poor Poor   In general, please rate how well you carry out your usual social activities and roles Fair Poor Poor   Poor Fair   To what extent are you able to carry out your everyday physical activities such as walking, climbing stairs, carrying groceries, or moving a chair? Moderately Moderately A little   Moderately A little   In the past 7 days, how often have you been bothered by emotional problems such as feeling anxious, depressed, or irritable? Often Often Sometimes   Sometimes Often   In the past 7 days, how would you rate your fatigue on average? Severe Severe Severe   Mild Moderate   In the past 7 days, how would you rate your pain on average, where 0 means no pain, and 10 means worst imaginable pain? 7 7 8   7 7   In general, would you say your health is: 2 2 2 2 3 2 2   In general, would you say your quality of life is: 1 1 1 2 2 3 1   In general, how would you rate your physical health? 2 2 2 2 2 2 2   In general, how would you rate your mental health, including your mood and your ability to think? 1 2 2 2 3 2 2   In general, how would you rate your satisfaction with your social activities and relationships? 1 2 1 1 1 1 1   In general, please rate how well you carry out your usual social activities and roles. (This includes activities at home, at work and in your community, and responsibilities as a parent, child, spouse, employee, friend, etc.) 2 1 1 1 1 1 2   To what extent are you able to carry out your everyday physical activities such as walking, climbing stairs, carrying groceries, or moving a chair? 3 3 2 2 3 3 2   In the past 7  "days, how often have you been bothered by emotional problems such as feeling anxious, depressed, or irritable? 4 4 3 3 4 3 4   In the past 7 days, how would you rate your fatigue on average? 4 4 4 4 2 2 3   In the past 7 days, how would you rate your pain on average, where 0 means no pain, and 10 means worst imaginable pain? 7 7 8 8 7 7 7   Global Mental Health Score 5 7    7    7 7    7 8 8 9    9    9    9    9    9 6   Global Physical Health Score 9 9    9    9 8    8 8 11 11    11    11    11    11    11 9   PROMIS TOTAL - SUBSCORES 14 16    16    16 15    15 16 19 20    20    20    20    20    20 15     [place] Suicide Severity Rating Scale (Lifetime/Recent)      5/5/2020     9:21 AM 6/11/2020    10:00 AM 1/9/2023     1:00 PM 5/21/2024     4:49 PM 5/21/2024     9:00 PM 6/6/2024     8:00 AM 7/10/2024    12:00 PM   [place] Suicide Severity Rating (Lifetime/Recent)   Q1 Wish to be Dead (Lifetime) Yes Yes   Yes     Comments \"When I was going through a couple of  years of counseling from a dysfunctional family about 30 years ago or more\" when patient was in treatment and there were family concerns   OD on medications     Q2 Non-Specific Active Suicidal Thoughts (Lifetime) Yes Yes   No     Non-Specific Active Suicidal Thought Description (Lifetime) Had thoughts of killing self         Most Severe Ideation Rating (Lifetime) 5 5   5     Most Severe Ideation Description (Lifetime) 35 years ago \"I just wanted to check out , I had thought of it so much and wanted to be done\" when family problems were happening   OD on medication     Frequency (Lifetime) 4    3     Duration (Lifetime) 2    3     Controllability (Lifetime) 3 0   2     Protective Factors  (Lifetime) 2 5   4     Reasons for Ideation (Lifetime) 5    5     Q1 Wished to be Dead (Past Month)   yes 1-->yes 1-->yes 1-->yes    Q2 Suicidal Thoughts (Past Month)   no 1-->yes 1-->yes 1-->yes    Q3 Suicidal Thought Method   no 0-->no 0-->no 1-->yes    Q4 Suicidal " Intent without Specific Plan   no 1-->yes 0-->no 0-->no    Q5 Suicide Intent with Specific Plan   no 0-->no 0-->no 1-->yes    Q6 Suicide Behavior (Lifetime)   yes 1-->yes 0-->no 1-->yes    If yes to Q6, within past 3 months?   no 1-->yes 0-->no 0-->no    Level of Risk per Screen   moderate risk high risk moderate risk high risk    RETIRED: 1. Wish to be Dead (Recent) No No        RETIRED: 2. Non-Specific Active Suicidal Thoughts (Recent) No No        3. Active Suicidal Ideation with any Methods (Not Plan) Without Intent to Act (Lifetime) Yes Yes        RETIRE: Active Suicidal Ideation with any Methods (Not Plan) Description (Lifetime) Took many pills of Prozac and was sent to the ED 30 years prior        RETIRED: 3. Active Suicidal Ideation with any Methods (Not Plan) Without Intent to Act (Recent) No         RETIRE: 4. Active Suicidal Ideation with Some Intent to Act, Without Specific Plan (Lifetime) Yes Yes        RETIRE: Active Suicidal Ideation with Some Intent to Act, Without Specific Plan Description (Lifetime) Took many pills of Prozac and was sent to the ED         4. Active Suicidal Ideation with Some Intent to Act, Without Specific Plan (Recent) No         RETIRE: 5. Active Suicidal Ideation with Specific Plan and Intent (Lifetime) Yes Yes        RETIRE: Active Suicidal Ideation with Specific Plan and Intent Description (Lifetime) Took many pills of Prozac and was sent to the ED over 30 years ago         RETIRED: 5. Active Suicidal Ideation with Specific Plan and Intent (Recent) No         Most Severe Ideation Rating (Past Month) NA         Frequency (Past Month) NA         Duration (Past Month) NA         Controllability (Past Month) NA         Protective Factors (Past Month) NA         Reasons for Ideation (Past Month) NA         Actual Attempt (Lifetime) Yes Yes        Actual Attempt Description (Lifetime) Took a bunch of pills patient reported overdosing on Prozac about thirty years ago        Total  Number of Actual Attempts (Lifetime) 1 1        Actual Attempt (Past 3 Months) No No        Has subject engaged in non-suicidal self-injurious behavior? (Lifetime) Yes Yes        Has subject engaged in non-suicidal self-injurious behavior? (Past 3 Months) No No        Interrupted Attempts (Lifetime) No No        Interrupted Attempts (Past 3 Months) No No        Aborted or Self-Interrupted Attempt (Lifetime) No No        Total Number Aborted or Self Interrupted Attempts (Lifetime) 0         Aborted or Self-Interrupted Attempt (Past 3 Months) No No        Preparatory Acts or Behavior (Lifetime) No No        Preparatory Acts or Behavior (Past 3 Months) No No        Most Recent Attempt Date 10/1/1984         Comments  30 years prior        Most Recent Attempt Actual Lethality Code 2 0        Comments This is a guess/pt. doesn't recall exact month or day         Most Lethal Attempt Actual Lethality Code 2         Comments only 1 attempt/same attempt as most recent & initial         Initial/First Attempt Date 10/1/1984         Initial/First Attempt Actual Lethality Code 2         Q1 Wish to be Dead (Lifetime)       N   Q2 Non-Specific Active Suicidal Thoughts (Lifetime)       N         ASSESSMENT: Current Emotional / Mental Status (status of significant symptoms):   Risk status (Self / Other harm or suicidal ideation)   Patient denies current fears or concerns for personal safety.   Patient reports the following current or recent suicidal ideation or behaviors: passive ideation.   Patient denies current or recent homicidal ideation or behaviors.   Patient denies current or recent self injurious behavior or ideation.   Patient denies other safety concerns.   Patient reports there has been no change in risk factors since their last session.     Patient reports there has been no change in protective factors since their last session.     A safety and risk management plan has been developed including: Patient consented to  co-developed safety plan on 6/4/24.  Safety and risk management plan was reviewed.   Patient agreed to use safety plan should any safety concerns arise.  A copy was made available to the patient.     Appearance:   Appropriate    Eye Contact:   Fair    Psychomotor Behavior: Normal    Attitude:   Cooperative  Pleasant   Orientation:   All   Speech    Rate / Production: Slow     Volume:  Normal    Mood:    Anxious    Affect:    Subdued    Thought Content:  Clear    Thought Form:  Coherent  Logical    Insight:    Good      Medication Review:   No changes to current psychiatric medication(s)     Medication Compliance:   Yes     Changes in Health Issues:   None reported     Chemical Use Review:   Substance Use: Chemical use reviewed, no active concerns identified      Tobacco Use: No current tobacco use.      Diagnosis:  1. Generalized anxiety disorder    2. Major depressive disorder, recurrent severe without psychotic features (H)          Collateral Reports Completed:   Not Applicable    PLAN: (Patient Tasks / Therapist Tasks / Other)  Talk with doctor about medication questions.      Erika Colorado, LMFT                                                         ______________________________________________________________________    Individual Treatment Plan    Patient's Name: Randi Cleary  YOB: 1955    Date of Creation: 7/17/2024  Date Treatment Plan Last Reviewed/Revised: 7/17/2024    DSM5 Diagnoses: 296.33 (F33.2) Major Depressive Disorder, Recurrent Episode, Severe _ or 300.02 (F41.1) Generalized Anxiety Disorder  Psychosocial / Contextual Factors: history of trauma  PROMIS (reviewed every 90 days):     Referral / Collaboration:  Discussed and patient will pursue a therapy group for depressive symptoms.    Anticipated number of session for this episode of care: 9-12 sessions  Anticipation frequency of session: Weekly  Anticipated Duration of each session: 38-52 minutes  Treatment plan will  be reviewed in 90 days or when goals have been changed.       MeasurableTreatment Goal(s) related to diagnosis / functional impairment(s)  Goal 1: Client will keep self safe and eliminate suicidal ideation and self-harm behaviors.    Objective #A (Client Action)    Client will make a list of at least 10 skills or activities that you will to use to distract from urges to harm self.  Status: New - Date: 7/17/24      Intervention(s)  Therapist will provide ideas and strategies for generating coping skills list.    Objective #B  Client will make a list of pros and cons for tolerating and not tolerating an urge to harm self.  Status: New - Date: 7/17/24      Intervention(s)  Therapist will guide client through DBT-style Pros/Cons list for behavior change.    Objective #C  Client will identify and practice the new strategies for dealing with strong emotions, learn and practice relaxation breathing.  Status: New - Date: 7/17/24      Intervention(s)  Therapist will teach distraction skills. Practice them within session and outside of session.    Goal 2: Client will report reduction in anxiety also as evidenced by reduction of CHAVO-7 score below 6 points within the next 12 weeks.    Objective #A (Client Action)    Client will identify at least three triggers for anxiety.  Status: New - Date: 7/17/24      Intervention(s)  Therapist will provide educational materials on common triggers and signs of anxiety.    Objective #B  Client will use relaxation strategies at least two times per day to reduce the physical symptoms of anxiety.  Status: New - Date: 7/17/24      Intervention(s)  Therapist will teach relaxation strategies such as mindfulness, deep breathing, muscle relaxation, and sensory activities.    Objective #C  Client will use cognitive strategies identified in therapy to challenge anxious thoughts.  Status: New - Date: 7/17/24      Intervention(s)  Therapist will teach strategies for cognitive modification using REBT  model.    Goal 3: Client will report improved mood as indicated by PHQ-9 score below 6 for consistent 8 weeks.    Objective #A (Client Action)    Status: New - Date: 7/17/24     Client will Increase interest, engagement, and pleasure in doing things.    Intervention(s)  Therapist will assign homework for daily involvement in pleasant activities.    Objective #B  Client will Identify negative self-talk and behaviors: challenge core beliefs, myths, and actions.    Status: New - Date: 7/17/24      Intervention(s)  Therapist will teach strategies for cognitive modification using REBT models.    Objective #C  Client will Improve quantity and quality of night time sleep / decrease daytime naps.  Status: New - Date: 7/17/24      Intervention(s)  Therapist will teach sleep hygiene strategies.  Assign homework for daily practice.    Goal 4: Client will report reduced or eliminated physiological activation when recalling a distressing event including decreased re-experiencing, decreased avoidance, and decreased hyperarousal.    Objective #A (Client Action)    Status: New - Date: 7/17/24      Client will acquire knowledge of trauma, common symptoms post-trauma, emotion regulation strategies, stress management strategies, and cognitive coping strategies.    Intervention(s)  Therapist will teach about effects of trauma on mind and body; encourage emotion identification and expression; practice with client the stress management strategies; and teach cognitive modification.    Objective #B  Client will use Brainspotting while focusing on physiological sensations related to distressing events.  Client will assess and rate level of physiological activation.  Status: New - Date: 7/17/24      Intervention(s)  Therapist will provide use a pointer or other materials to assist client in holding a brainspot in their visual field.  Therapist might also provide biolateral music through headphones to deepen the experience.         Patient  has reviewed and agreed to the above plan.      Erika Colorado, LMFT  July 17, 2024

## 2024-09-20 ENCOUNTER — TELEPHONE (OUTPATIENT)
Dept: CARDIOLOGY | Facility: CLINIC | Age: 69
End: 2024-09-20
Payer: MEDICARE

## 2024-09-20 NOTE — TELEPHONE ENCOUNTER
Called pt and she wasn't feeling very good.  Requested I call back later.    Miki Richardson RN on 9/20/2024 at 9:45 AM      Pre-procedure instructions - Right Heart Cath    Patient Education    Your arrival time is 930am on Wednesday 9/25.  Location is [address] Waiting Room  Please plan on being at the hospital all day.  At any time, emergencies and/or urgent cases may come up which could delay the start of your procedure.    Pre-procedure instructions - Right heart catheterization  No solid food for 8 hours prior  Nothing to drink 2 hours prior to arrival time  You can take your morning medications (except diabetic and blood thinners) with sips of water  We recommend you arrange for a ride to drop you off and pick you up, in the instance, you are unable to drive home, however you should be able to function as you normally would after the procedure                Anticoagulation Medication Instructions   NA  Nurse to write N/A if not currently taking    Follow-up appt with Dr Edwards at 830am on Tuesday 10/8.    Pt verbalized understanding and had no questions at the time of instruction.      Miki Richardson RN on 9/20/2024 at 9:45 AM  Miki Richardson RN on 9/20/2024 at 2:12 PM          Candace Jarrett Bryan, CHRISTINA; Zandra Caballero  Cath Lab Case Request/Order    Location: [address] Waiting Room    Procedure: Right Heart Cath (RHC)    Procedure Date: 09/23/24    Patient Arrival Time: 10:30 AM    Procedure Time: 3rd case to follow    Ordering Provider: Dr. Francisco J Edwards    Performing Cardiologist: Dr. Yeo    Inpatient Bed Needed: No    Post-  Procedure RAKAN appointment scheduled (1 - 2 weeks): N/A, RHC     Date:     Provider:    Communicated Patient Instructions:     NPO, nothing to eat 8 hours and drink 2 hours before arrival time: No      , need to arrange a ride home - unable to drive post- procedure: N/A, RHC     Adult at home, need a responsible adult to stay with patient 24 hours post- procedure: N/A, RHC    Appointment was scheduled: Over the phone    Patient expressed understanding of above instructions and denied further questions at this time.    Candace Jarrett
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
## 2024-09-23 ENCOUNTER — VIRTUAL VISIT (OUTPATIENT)
Dept: PSYCHIATRY | Facility: CLINIC | Age: 69
End: 2024-09-23
Payer: MEDICARE

## 2024-09-23 VITALS — BODY MASS INDEX: 28.25 KG/M2 | WEIGHT: 175 LBS

## 2024-09-23 DIAGNOSIS — G47.00 PERSISTENT INSOMNIA: ICD-10-CM

## 2024-09-23 DIAGNOSIS — F33.2 SEVERE EPISODE OF RECURRENT MAJOR DEPRESSIVE DISORDER, WITHOUT PSYCHOTIC FEATURES (H): Primary | ICD-10-CM

## 2024-09-23 PROCEDURE — 99214 OFFICE O/P EST MOD 30 MIN: CPT | Mod: 95

## 2024-09-23 PROCEDURE — 90833 PSYTX W PT W E/M 30 MIN: CPT | Mod: 95

## 2024-09-23 PROCEDURE — G2211 COMPLEX E/M VISIT ADD ON: HCPCS | Mod: 95

## 2024-09-23 ASSESSMENT — PAIN SCALES - GENERAL: PAINLEVEL: WORST PAIN (10)

## 2024-09-23 NOTE — NURSING NOTE
Current patient location: [address] [place] [id number]    Is the patient currently in the state of [place]? YES    Visit mode:VIDEO    If the visit is dropped, the patient can be reconnected by: VIDEO VISIT: Text to cell phone:   Telephone Information:   Mobile [phone]       Will anyone else be joining the visit? NO  (If patient encounters technical issues they should call [phone]    How would you like to obtain your AVS? MyChart    Are changes needed to the allergy or medication list? No    Are refills needed on medications prescribed by this physician? YES    Rooming Documentation:  Questionnaire(s) completed    Reason for visit: RECHECK    Nai GARCIAF

## 2024-09-23 NOTE — PROGRESS NOTES
Virtual Visit Details    Type of service:  Video Visit   Video Start Time: 1:01 PM  Video End Time:1:35 PM    Originating Location (pt. Location): Home    Distant Location (provider location):  On-site  Platform used for Video Visit: Tara

## 2024-09-23 NOTE — PATIENT INSTRUCTIONS
Medication Plan  -Continue gabapentin 400mg-700mg at bedtime and 100mg Q6H PRN for anxiety. Max daily dose: 800mg.   -Discontinue Viibryd   -Start Auvelity 45-105mg daily    **For crisis resources, please see the information at the end of this document**   Patient Education    Thank you for coming to the [facility] [place] [facility].     Lab Testing:  If you had lab testing today and your results are reassuring or normal they will be mailed to you or sent through [email or web address] within 7 days. If the lab tests need quick action we will call you with the results. The phone number we will call with results is # [phone]. If this is not the best number please call our clinic and change the number.     Medication Refills:  If you need any refills please call your pharmacy and they will contact us. Our fax number for refills is [phone].   Three business days of notice are needed for general medication refill requests.   Five business days of notice are needed for controlled substance refill requests.   If you need to change to a different pharmacy, please contact the new pharmacy directly. The new pharmacy will help you get your medications transferred.     Contact Us:  Please call [phone] during business hours (8-5:00 M-F).   If you have medication related questions after clinic hours, or on the weekend, please call [phone].     Financial Assistance [phone]   Medical Records [phone]       MENTAL HEALTH CRISIS RESOURCES:  For a emergency help, please call 911 or go to the nearest Emergency Department.     Emergency Walk-In Options:   [facility] @ [facility] ([place]): [phone] - Specialized mental health emergency area designed to be calming  [facility] ([place]): [phone]  [facility] ([place]): [phone]  [facility]): [phone]    [facility] Crisis Information:   [place]:  [phone]  [place]: [phone]  [place] ([place]) - Adult: [phone]     Child: [phone]  [place] - Adult: [phone]     Child: [phone]  [place]: [phone]  List of all [facility] resources:   [place]://[place].gov/dhs/people-we-serve/adults/health-care/mental-health/resources/crisis-contacts.jsp    National Crisis Information:   Crisis Text Line: Text  [place]  to 625913  Suicide & Crisis Lifeline: 988  National Suicide Prevention Lifeline: 5-324-751-TALK (1-630.339.2111)       For online chat options, visit https://suicidepreventionlifeline.org/chat/  Poison Control Center: 2-669-245-0241  Trans Lifeline: 1-810.619.5077 - Hotline for transgender people of all ages  [employer]: 0-117-239-1372 - Hotline for LGBT youth     For Non-Emergency Support:   Fast Tracker: Mental Health & Substance Use Disorder Resources -   https://www.[employer].org/

## 2024-09-24 ENCOUNTER — OFFICE VISIT (OUTPATIENT)
Dept: PSYCHIATRY | Facility: CLINIC | Age: 69
End: 2024-09-24
Payer: MEDICARE

## 2024-09-24 DIAGNOSIS — F41.1 GENERALIZED ANXIETY DISORDER: ICD-10-CM

## 2024-09-24 DIAGNOSIS — F33.2 SEVERE EPISODE OF RECURRENT MAJOR DEPRESSIVE DISORDER, WITHOUT PSYCHOTIC FEATURES (H): Primary | ICD-10-CM

## 2024-09-24 RX ORDER — LANOLIN ALCOHOL/MO/W.PET/CERES
3 CREAM (GRAM) TOPICAL
Qty: 30 TABLET | Refills: 1 | Status: SHIPPED | OUTPATIENT
Start: 2024-09-24

## 2024-09-24 ASSESSMENT — ANXIETY QUESTIONNAIRES
7. FEELING AFRAID AS IF SOMETHING AWFUL MIGHT HAPPEN: SEVERAL DAYS
8. IF YOU CHECKED OFF ANY PROBLEMS, HOW DIFFICULT HAVE THESE MADE IT FOR YOU TO DO YOUR WORK, TAKE CARE OF THINGS AT HOME, OR GET ALONG WITH OTHER PEOPLE?: VERY DIFFICULT
GAD7 TOTAL SCORE: 9

## 2024-09-25 ENCOUNTER — APPOINTMENT (OUTPATIENT)
Dept: LAB | Facility: CLINIC | Age: 69
End: 2024-09-25
Attending: INTERNAL MEDICINE
Payer: MEDICARE

## 2024-09-25 ENCOUNTER — HOSPITAL ENCOUNTER (OUTPATIENT)
Facility: CLINIC | Age: 69
Discharge: HOME | End: 2024-09-25
Attending: INTERNAL MEDICINE | Admitting: INTERNAL MEDICINE
Payer: MEDICARE

## 2024-09-25 ENCOUNTER — APPOINTMENT (OUTPATIENT)
Dept: MEDSURG UNIT | Facility: CLINIC | Age: 69
End: 2024-09-25
Attending: INTERNAL MEDICINE
Payer: MEDICARE

## 2024-09-25 VITALS
TEMPERATURE: 97.5 F | WEIGHT: 171.7 LBS | SYSTOLIC BLOOD PRESSURE: 99 MMHG | HEART RATE: 68 BPM | RESPIRATION RATE: 16 BRPM | BODY MASS INDEX: 27.59 KG/M2 | HEIGHT: 66 IN | OXYGEN SATURATION: 95 % | DIASTOLIC BLOOD PRESSURE: 66 MMHG

## 2024-09-25 DIAGNOSIS — I27.20 PULMONARY HYPERTENSION (H): ICD-10-CM

## 2024-09-25 DIAGNOSIS — R06.02 SOB (SHORTNESS OF BREATH): ICD-10-CM

## 2024-09-25 LAB
ANION GAP SERPL CALCULATED.3IONS-SCNC: 10 MMOL/L (ref 7–15)
BASOPHILS # BLD AUTO: 0.1 10E3/UL (ref 0–0.2)
BASOPHILS NFR BLD AUTO: 1 %
BUN SERPL-MCNC: 10.2 MG/DL (ref 8–23)
CALCIUM SERPL-MCNC: 9.8 MG/DL (ref 8.8–10.4)
CHLORIDE SERPL-SCNC: 102 MMOL/L (ref 98–107)
CREAT SERPL-MCNC: 0.54 MG/DL (ref 0.51–0.95)
EGFRCR SERPLBLD CKD-EPI 2021: >90 ML/MIN/1.73M2
EOSINOPHIL # BLD AUTO: 0.1 10E3/UL (ref 0–0.7)
EOSINOPHIL NFR BLD AUTO: 1 %
ERYTHROCYTE [DISTWIDTH] IN BLOOD BY AUTOMATED COUNT: 12.7 % (ref 10–15)
GLUCOSE SERPL-MCNC: 99 MG/DL (ref 70–99)
HCG INTACT+B SERPL-ACNC: 3 MIU/ML
HCO3 SERPL-SCNC: 28 MMOL/L (ref 22–29)
HCT VFR BLD AUTO: 50.6 % (ref 35–47)
HGB BLD-MCNC: 15.6 G/DL (ref 11.7–15.7)
HGB BLD-MCNC: 17.2 G/DL (ref 11.7–15.7)
IMM GRANULOCYTES # BLD: 0.1 10E3/UL
IMM GRANULOCYTES NFR BLD: 1 %
INR PPP: 1.01 (ref 0.85–1.15)
LYMPHOCYTES # BLD AUTO: 1.4 10E3/UL (ref 0.8–5.3)
LYMPHOCYTES NFR BLD AUTO: 14 %
MCH RBC QN AUTO: 33.1 PG (ref 26.5–33)
MCHC RBC AUTO-ENTMCNC: 34 G/DL (ref 31.5–36.5)
MCV RBC AUTO: 98 FL (ref 78–100)
MONOCYTES # BLD AUTO: 0.6 10E3/UL (ref 0–1.3)
MONOCYTES NFR BLD AUTO: 6 %
NEUTROPHILS # BLD AUTO: 7.7 10E3/UL (ref 1.6–8.3)
NEUTROPHILS NFR BLD AUTO: 78 %
NRBC # BLD AUTO: 0 10E3/UL
NRBC BLD AUTO-RTO: 0 /100
NT-PROBNP SERPL-MCNC: 434 PG/ML (ref 0–900)
OXYHGB MFR BLDV: 70 % (ref 70–75)
PLATELET # BLD AUTO: 277 10E3/UL (ref 150–450)
POTASSIUM SERPL-SCNC: 4.6 MMOL/L (ref 3.4–5.3)
RBC # BLD AUTO: 5.19 10E6/UL (ref 3.8–5.2)
SODIUM SERPL-SCNC: 140 MMOL/L (ref 135–145)
WBC # BLD AUTO: 9.9 10E3/UL (ref 4–11)

## 2024-09-25 PROCEDURE — 999N000132 HC STATISTIC PP CARE STAGE 1

## 2024-09-25 PROCEDURE — 85610 PROTHROMBIN TIME: CPT | Performed by: INTERNAL MEDICINE

## 2024-09-25 PROCEDURE — 250N000009 HC RX 250: Performed by: INTERNAL MEDICINE

## 2024-09-25 PROCEDURE — 272N000001 HC OR GENERAL SUPPLY STERILE: Performed by: INTERNAL MEDICINE

## 2024-09-25 PROCEDURE — 93451 RIGHT HEART CATH: CPT | Mod: 26 | Performed by: INTERNAL MEDICINE

## 2024-09-25 PROCEDURE — 85025 COMPLETE CBC W/AUTO DIFF WBC: CPT | Performed by: INTERNAL MEDICINE

## 2024-09-25 PROCEDURE — 999N000142 HC STATISTIC PROCEDURE PREP ONLY

## 2024-09-25 PROCEDURE — 250N000013 HC RX MED GY IP 250 OP 250 PS 637: Performed by: INTERNAL MEDICINE

## 2024-09-25 PROCEDURE — 93451 RIGHT HEART CATH: CPT | Performed by: INTERNAL MEDICINE

## 2024-09-25 PROCEDURE — 85018 HEMOGLOBIN: CPT

## 2024-09-25 PROCEDURE — 36415 COLL VENOUS BLD VENIPUNCTURE: CPT | Performed by: INTERNAL MEDICINE

## 2024-09-25 PROCEDURE — C1751 CATH, INF, PER/CENT/MIDLINE: HCPCS | Performed by: INTERNAL MEDICINE

## 2024-09-25 PROCEDURE — 83880 ASSAY OF NATRIURETIC PEPTIDE: CPT | Performed by: INTERNAL MEDICINE

## 2024-09-25 PROCEDURE — 84702 CHORIONIC GONADOTROPIN TEST: CPT | Performed by: INTERNAL MEDICINE

## 2024-09-25 PROCEDURE — 80048 BASIC METABOLIC PNL TOTAL CA: CPT | Performed by: INTERNAL MEDICINE

## 2024-09-25 PROCEDURE — 82810 BLOOD GASES O2 SAT ONLY: CPT

## 2024-09-25 RX ORDER — LIDOCAINE 40 MG/G
CREAM TOPICAL
Status: COMPLETED | OUTPATIENT
Start: 2024-09-25 | End: 2024-09-25

## 2024-09-25 RX ORDER — DIAZEPAM 5 MG
5 TABLET ORAL ONCE
Status: COMPLETED | OUTPATIENT
Start: 2024-09-25 | End: 2024-09-25

## 2024-09-25 RX ORDER — OXYCODONE HYDROCHLORIDE 30 MG/1
25 TABLET ORAL EVERY 4 HOURS PRN
COMMUNITY

## 2024-09-25 RX ADMIN — LIDOCAINE: 40 CREAM TOPICAL at 11:08

## 2024-09-25 RX ADMIN — DIAZEPAM 5 MG: 5 TABLET ORAL at 11:08

## 2024-09-25 ASSESSMENT — ACTIVITIES OF DAILY LIVING (ADL)
ADLS_ACUITY_SCORE: 37

## 2024-09-25 NOTE — PRE-PROCEDURE
GENERAL PRE-PROCEDURE:   Procedure:  Right heart catheterization  Date/Time:  9/25/2024 11:17 AM    Verbal consent obtained?: Yes    Risks and benefits: Risks, benefits and alternatives were discussed    Consent given by:  Patient  Patient states understanding of procedure being performed: Yes    Patient's understanding of procedure matches consent: Yes    Procedure consent matches procedure scheduled: Yes    Appropriately NPO:  Yes  ASA Class:  3  Mallampati  :  Grade 3- soft palate visible, posterior pharyngeal wall not visible  Lungs:  Lungs clear with good breath sounds bilaterally  Heart:  Normal heart sounds and rate  History & Physical reviewed:  History and physical reviewed and no updates needed  Statement of review:  I have reviewed the lab findings, diagnostic data, medications, and the plan for sedation

## 2024-09-25 NOTE — PROGRESS NOTES
Pt arrived for RHC. Lidocaine to RIJ site, covered with Tegaderm. Consent signed. Dr. Eugenio fabian with 5mg of PO Valium.  Sidney at the bedside.

## 2024-09-25 NOTE — PROGRESS NOTES
Pt arrived back on 2A post RHC. Vitals stable. Denies pain. Right internal jugular site covered with primapore soft, dry and intact. Pt eating/drinking.

## 2024-09-25 NOTE — DISCHARGE INSTRUCTIONS
[facility]                        Interventional Cardiology  Discharge Instructions   Post Right Heart Cath       AFTER YOU GO HOME:  DO drink plenty of fluids  DO resume your regular diet and medications unless otherwise instructed by your Primary Physician  Do Not scrub the procedure site vigorously  No lotion or powder to the puncture site for 3 days    CALL YOUR PRIMARY PHYSICIAN IF: You may resume all normal activity.  Monitor neck site for bleeding, swelling, or voice changes. If you notice bleeding or swelling immediately apply pressure to the site and call number below to speak with Cardiology Fellow.  If you experience any changes in your breathing you should call your doctor immediately or come to the closest Emergency Department.  Do not drive yourself.    ADDITIONAL INSTRUCTIONS: Medications: You are to resume all home medications including anticoagulation therapy unless otherwise advised by your primary cardiologist or nurse coordinator.    Follow Up: Per your primary cardiology team    If you have any questions or concerns regarding your procedure site please call [phone] at anytime and ask for Cardiology Fellow on call.  They are available 24 hours a day.  You may also contact the Cardiology Clinic after hours number at [phone].                                                       Telephone Numbers [phone] Monday-Friday 8:00 am to 4:30 pm    [phone] After 4:30 pm Monday-Friday, Weekends & Holidays  Ask for Interventional Cardiologist on call. Someone is on call 24 hours/day   [facility] toll free number [phone] Monday-Friday 8:00 am to 4:30 pm   [facility] Emergency Dept [phone]

## 2024-09-25 NOTE — PROGRESS NOTES
Right internal jugular remained soft, dry and intact. Discharge instructions reviewed with pt and pt verbalizes understanding. Copy of AVS given. Pt escorted via wheelchair to  accompanied by . Pt has all her belongings.

## 2024-09-26 ENCOUNTER — VIRTUAL VISIT (OUTPATIENT)
Dept: PSYCHOLOGY | Facility: CLINIC | Age: 69
End: 2024-09-26
Payer: MEDICARE

## 2024-09-26 DIAGNOSIS — F33.2 MAJOR DEPRESSIVE DISORDER, RECURRENT SEVERE WITHOUT PSYCHOTIC FEATURES (H): ICD-10-CM

## 2024-09-26 DIAGNOSIS — F41.1 GENERALIZED ANXIETY DISORDER: Primary | ICD-10-CM

## 2024-09-26 PROCEDURE — 90837 PSYTX W PT 60 MINUTES: CPT | Mod: 95 | Performed by: MARRIAGE & FAMILY THERAPIST

## 2024-09-26 NOTE — PROGRESS NOTES
[facility]                                     Progress Note    Patient Name: Randi Cleary  Date: 2024       Service Type: Individual      Session Start Time: 2:30  Session End Time: 3:26     Session Length: 53+    Session #: 5    Attendees: Client    Service Modality:  Video Visit:      Provider verified identity through the following two step process.  Patient provided:  Patient  and Patient address    Telemedicine Visit: The patient's condition can be safely assessed and treated via synchronous audio and visual telemedicine encounter.      Reason for Telemedicine Visit: Patient has requested telehealth visit    Originating Site (Patient Location): Patient's home    Distant Site (Provider Location): Provider Remote Setting- Home Office    Consent:  The patient/guardian has verbally consented to: the potential risks and benefits of telemedicine (video visit) versus in person care; bill my insurance or make self-payment for services provided; and responsibility for payment of non-covered services.     Patient would like the video invitation sent by:  My Chart    Mode of Communication:  Video Conference via Amwell    Distant Location (Provider):  Off-site    As the provider I attest to compliance with applicable laws and regulations related to telemedicine.    DATA  Interactive Complexity: No  Crisis: No        Progress Since Last Session (Related to Symptoms / Goals / Homework):   Symptoms: No change      Homework:  n/a      Episode of Care Goals: Minimal progress - PREPARATION (Decided to change - considering how); Intervened by negotiating a change plan and determining options / strategies for behavior change, identifying triggers, exploring social supports, and working towards setting a date to begin behavior change     Current / Ongoing Stressors and Concerns:   Difficult emotional experience at medical procedure yesterday and then experienced a fall down concrete steps at a  restaurant later in the day.  Feels physically sore and mentally tired today.  Tearful about bills and various financial stressors.  Identifies people in her life who are supportive and validating self for feeling as she does today.     Treatment Objective(s) Addressed in This Session:   Distress tolerance     Intervention:   Relationship building, assessment.  Validation, encouragement.  Review of distress tolerance.      Assessments completed prior to visit:  The following assessments were completed by patient for this visit:  PHQ9:       9/6/2024    11:53 AM 9/9/2024     1:30 PM 9/12/2024     8:49 AM 9/16/2024    12:47 PM 9/18/2024     2:27 PM 9/24/2024     9:53 AM 9/26/2024    11:52 AM   PHQ-9 SCORE   PHQ-9 Total Score MyChart 10 (Moderate depression) 15 (Moderately severe depression) 13 (Moderate depression) 16 (Moderately severe depression) 12 (Moderate depression) 12 (Moderate depression) 15 (Moderately severe depression)   PHQ-9 Total Score 10 15 13 16    16 12 12 15     GAD7:       5/15/2024    11:25 AM 6/6/2024     8:00 AM 6/27/2024    12:48 PM 7/1/2024    12:10 PM 7/17/2024     9:55 AM 9/9/2024     1:33 PM 9/24/2024     9:54 AM   CHAVO-7 SCORE   Total Score 9 (mild anxiety)  11 (moderate anxiety)  10 (moderate anxiety) 13 (moderate anxiety) 9 (mild anxiety)   Total Score 9    9    9    9 9 11    11    11    11    11 11 10 13 9     CAGE-AID:       6/22/2020     7:12 PM 1/18/2022    11:15 PM 6/6/2024     8:00 AM   CAGE-AID Total Score   Total Score 4 4 4   Total Score MyChart 4 (A total score of 2 or greater is considered clinically significant) 4 (A total score of 2 or greater is considered clinically significant)      PROMIS 10-Global Health (all questions and answers displayed):       1/22/2024    12:00 PM 5/1/2024    11:53 AM 5/15/2024    11:27 AM 6/6/2024     8:00 AM 7/1/2024    12:10 PM 7/10/2024     9:53 AM 7/17/2024    10:01 AM   PROMIS 10   In general, would you say your health is: Fair Fair Fair    Fair Fair   In general, would you say your quality of life is: Poor Poor Poor   Good Poor   In general, how would you rate your physical health? Fair Fair Fair   Fair Fair   In general, how would you rate your mental health, including your mood and your ability to think? Poor Fair Fair   Fair Fair   In general, how would you rate your satisfaction with your social activities and relationships? Poor Fair Poor   Poor Poor   In general, please rate how well you carry out your usual social activities and roles Fair Poor Poor   Poor Fair   To what extent are you able to carry out your everyday physical activities such as walking, climbing stairs, carrying groceries, or moving a chair? Moderately Moderately A little   Moderately A little   In the past 7 days, how often have you been bothered by emotional problems such as feeling anxious, depressed, or irritable? Often Often Sometimes   Sometimes Often   In the past 7 days, how would you rate your fatigue on average? Severe Severe Severe   Mild Moderate   In the past 7 days, how would you rate your pain on average, where 0 means no pain, and 10 means worst imaginable pain? 7 7 8   7 7   In general, would you say your health is: 2 2 2 2 3 2 2   In general, would you say your quality of life is: 1 1 1 2 2 3 1   In general, how would you rate your physical health? 2 2 2 2 2 2 2   In general, how would you rate your mental health, including your mood and your ability to think? 1 2 2 2 3 2 2   In general, how would you rate your satisfaction with your social activities and relationships? 1 2 1 1 1 1 1   In general, please rate how well you carry out your usual social activities and roles. (This includes activities at home, at work and in your community, and responsibilities as a parent, child, spouse, employee, friend, etc.) 2 1 1 1 1 1 2   To what extent are you able to carry out your everyday physical activities such as walking, climbing stairs, carrying groceries, or moving  "a chair? 3 3 2 2 3 3 2   In the past 7 days, how often have you been bothered by emotional problems such as feeling anxious, depressed, or irritable? 4 4 3 3 4 3 4   In the past 7 days, how would you rate your fatigue on average? 4 4 4 4 2 2 3   In the past 7 days, how would you rate your pain on average, where 0 means no pain, and 10 means worst imaginable pain? 7 7 8 8 7 7 7   Global Mental Health Score 5     7    7 7    7 8 8 9        9    9    9    9 6   Global Physical Health Score 9     9    9 8    8 8 11 11        11    11    11    11 9   PROMIS TOTAL - SUBSCORES 14     16    16 15    15 16 19 20        20    20    20    20 15       Information is confidential and restricted. Go to Review Flowsheets to unlock data.    Multiple values from one day are sorted in reverse-chronological order     [place] Suicide Severity Rating Scale (Lifetime/Recent)      5/5/2020     9:21 AM 6/11/2020    10:00 AM 1/9/2023     1:00 PM 5/21/2024     4:49 PM 5/21/2024     9:00 PM 6/6/2024     8:00 AM 7/10/2024    12:00 PM   [place] Suicide Severity Rating (Lifetime/Recent)   Q1 Wish to be Dead (Lifetime) Yes Yes   Yes     Comments \"When I was going through a couple of  years of counseling from a dysfunctional family about 30 years ago or more\" when patient was in treatment and there were family concerns   OD on medications     Q2 Non-Specific Active Suicidal Thoughts (Lifetime) Yes Yes   No     Non-Specific Active Suicidal Thought Description (Lifetime) Had thoughts of killing self         Most Severe Ideation Rating (Lifetime) 5 5   5     Most Severe Ideation Description (Lifetime) 35 years ago \"I just wanted to check out , I had thought of it so much and wanted to be done\" when family problems were happening   OD on medication     Frequency (Lifetime) 4    3     Duration (Lifetime) 2    3     Controllability (Lifetime) 3 0   2     Protective Factors  (Lifetime) 2 5   4     Reasons for Ideation (Lifetime) 5    5     Q1 Wished to " be Dead (Past Month)   yes 1-->yes 1-->yes 1-->yes    Q2 Suicidal Thoughts (Past Month)   no 1-->yes 1-->yes 1-->yes    Q3 Suicidal Thought Method   no 0-->no 0-->no 1-->yes    Q4 Suicidal Intent without Specific Plan   no 1-->yes 0-->no 0-->no    Q5 Suicide Intent with Specific Plan   no 0-->no 0-->no 1-->yes    Q6 Suicide Behavior (Lifetime)   yes 1-->yes 0-->no 1-->yes    If yes to Q6, within past 3 months?   no 1-->yes 0-->no 0-->no    Level of Risk per Screen   moderate risk high risk moderate risk high risk    RETIRED: 1. Wish to be Dead (Recent) No No        RETIRED: 2. Non-Specific Active Suicidal Thoughts (Recent) No No        3. Active Suicidal Ideation with any Methods (Not Plan) Without Intent to Act (Lifetime) Yes Yes        RETIRE: Active Suicidal Ideation with any Methods (Not Plan) Description (Lifetime) Took many pills of Prozac and was sent to the ED 30 years prior        RETIRED: 3. Active Suicidal Ideation with any Methods (Not Plan) Without Intent to Act (Recent) No         RETIRE: 4. Active Suicidal Ideation with Some Intent to Act, Without Specific Plan (Lifetime) Yes Yes        RETIRE: Active Suicidal Ideation with Some Intent to Act, Without Specific Plan Description (Lifetime) Took many pills of Prozac and was sent to the ED         4. Active Suicidal Ideation with Some Intent to Act, Without Specific Plan (Recent) No         RETIRE: 5. Active Suicidal Ideation with Specific Plan and Intent (Lifetime) Yes Yes        RETIRE: Active Suicidal Ideation with Specific Plan and Intent Description (Lifetime) Took many pills of Prozac and was sent to the ED over 30 years ago         RETIRED: 5. Active Suicidal Ideation with Specific Plan and Intent (Recent) No         Most Severe Ideation Rating (Past Month) NA         Frequency (Past Month) NA         Duration (Past Month) NA         Controllability (Past Month) NA         Protective Factors (Past Month) NA         Reasons for Ideation (Past Month)  NA         Actual Attempt (Lifetime) Yes Yes        Actual Attempt Description (Lifetime) Took a bunch of pills patient reported overdosing on Prozac about thirty years ago        Total Number of Actual Attempts (Lifetime) 1 1        Actual Attempt (Past 3 Months) No No        Has subject engaged in non-suicidal self-injurious behavior? (Lifetime) Yes Yes        Has subject engaged in non-suicidal self-injurious behavior? (Past 3 Months) No No        Interrupted Attempts (Lifetime) No No        Interrupted Attempts (Past 3 Months) No No        Aborted or Self-Interrupted Attempt (Lifetime) No No        Total Number Aborted or Self Interrupted Attempts (Lifetime) 0         Aborted or Self-Interrupted Attempt (Past 3 Months) No No        Preparatory Acts or Behavior (Lifetime) No No        Preparatory Acts or Behavior (Past 3 Months) No No        Most Recent Attempt Date 10/1/1984         Comments  30 years prior        Most Recent Attempt Actual Lethality Code 2 0        Comments This is a guess/pt. doesn't recall exact month or day         Most Lethal Attempt Actual Lethality Code 2         Comments only 1 attempt/same attempt as most recent & initial         Initial/First Attempt Date 10/1/1984         Initial/First Attempt Actual Lethality Code 2         Q1 Wish to be Dead (Lifetime)       N   Q2 Non-Specific Active Suicidal Thoughts (Lifetime)       N         ASSESSMENT: Current Emotional / Mental Status (status of significant symptoms):   Risk status (Self / Other harm or suicidal ideation)   Patient denies current fears or concerns for personal safety.   Patient reports the following current or recent suicidal ideation or behaviors: passive ideation of wanting to escape the struggles, no plan, no intent.   Patient denies current or recent homicidal ideation or behaviors.   Patient denies current or recent self injurious behavior or ideation.   Patient denies other safety concerns.   Patient reports there has  been no change in risk factors since their last session.     Patient reports there has been no change in protective factors since their last session.     A safety and risk management plan has been developed including: Patient consented to co-developed safety plan on 6/4/24.  Safety and risk management plan was reviewed.   Patient agreed to use safety plan should any safety concerns arise.  A copy was made available to the patient.     Appearance:   Appropriate    Eye Contact:   Fair    Psychomotor Behavior: Normal    Attitude:   Cooperative  Pleasant   Orientation:   All   Speech    Rate / Production: Slow     Volume:  Normal    Mood:    Depressed  Sad    Affect:    Subdued  Tearful   Thought Content:  Clear    Thought Form:  Coherent  Logical    Insight:    Good      Medication Review:   No changes to current psychiatric medication(s)     Medication Compliance:   Yes     Changes in Health Issues:   None reported     Chemical Use Review:   Substance Use: Chemical use reviewed, no active concerns identified      Tobacco Use: No current tobacco use.      Diagnosis:  1. Generalized anxiety disorder    2. Major depressive disorder, recurrent severe without psychotic features (H)        Collateral Reports Completed:   Not Applicable    PLAN: (Patient Tasks / Therapist Tasks / Other)  Focus on self-care, distress tolerance.  One thing at a time.      Erika Colorado, XOCHILTFT                                                         ______________________________________________________________________    Individual Treatment Plan    Patient's Name: Randi Claery  YOB: 1955    Date of Creation: 7/17/2024  Date Treatment Plan Last Reviewed/Revised: 7/17/2024    DSM5 Diagnoses: 296.33 (F33.2) Major Depressive Disorder, Recurrent Episode, Severe _ or 300.02 (F41.1) Generalized Anxiety Disorder  Psychosocial / Contextual Factors: history of trauma  PROMIS (reviewed every 90 days):     Referral /  Collaboration:  Discussed and patient will pursue a therapy group for depressive symptoms.    Anticipated number of session for this episode of care: 9-12 sessions  Anticipation frequency of session: Weekly  Anticipated Duration of each session: 38-52 minutes  Treatment plan will be reviewed in 90 days or when goals have been changed.       MeasurableTreatment Goal(s) related to diagnosis / functional impairment(s)  Goal 1: Client will keep self safe and eliminate suicidal ideation and self-harm behaviors.    Objective #A (Client Action)    Client will make a list of at least 10 skills or activities that you will to use to distract from urges to harm self.  Status: New - Date: 7/17/24      Intervention(s)  Therapist will provide ideas and strategies for generating coping skills list.    Objective #B  Client will make a list of pros and cons for tolerating and not tolerating an urge to harm self.  Status: New - Date: 7/17/24      Intervention(s)  Therapist will guide client through DBT-style Pros/Cons list for behavior change.    Objective #C  Client will identify and practice the new strategies for dealing with strong emotions, learn and practice relaxation breathing.  Status: New - Date: 7/17/24      Intervention(s)  Therapist will teach distraction skills. Practice them within session and outside of session.    Goal 2: Client will report reduction in anxiety also as evidenced by reduction of CHAVO-7 score below 6 points within the next 12 weeks.    Objective #A (Client Action)    Client will identify at least three triggers for anxiety.  Status: New - Date: 7/17/24      Intervention(s)  Therapist will provide educational materials on common triggers and signs of anxiety.    Objective #B  Client will use relaxation strategies at least two times per day to reduce the physical symptoms of anxiety.  Status: New - Date: 7/17/24      Intervention(s)  Therapist will teach relaxation strategies such as mindfulness, deep  breathing, muscle relaxation, and sensory activities.    Objective #C  Client will use cognitive strategies identified in therapy to challenge anxious thoughts.  Status: New - Date: 7/17/24      Intervention(s)  Therapist will teach strategies for cognitive modification using REBT model.    Goal 3: Client will report improved mood as indicated by PHQ-9 score below 6 for consistent 8 weeks.    Objective #A (Client Action)    Status: New - Date: 7/17/24     Client will Increase interest, engagement, and pleasure in doing things.    Intervention(s)  Therapist will assign homework for daily involvement in pleasant activities.    Objective #B  Client will Identify negative self-talk and behaviors: challenge core beliefs, myths, and actions.    Status: New - Date: 7/17/24      Intervention(s)  Therapist will teach strategies for cognitive modification using REBT models.    Objective #C  Client will Improve quantity and quality of night time sleep / decrease daytime naps.  Status: New - Date: 7/17/24      Intervention(s)  Therapist will teach sleep hygiene strategies.  Assign homework for daily practice.    Goal 4: Client will report reduced or eliminated physiological activation when recalling a distressing event including decreased re-experiencing, decreased avoidance, and decreased hyperarousal.    Objective #A (Client Action)    Status: New - Date: 7/17/24      Client will acquire knowledge of trauma, common symptoms post-trauma, emotion regulation strategies, stress management strategies, and cognitive coping strategies.    Intervention(s)  Therapist will teach about effects of trauma on mind and body; encourage emotion identification and expression; practice with client the stress management strategies; and teach cognitive modification.    Objective #B  Client will use Brainspotting while focusing on physiological sensations related to distressing events.  Client will assess and rate level of physiological  activation.  Status: New - Date: 7/17/24      Intervention(s)  Therapist will provide use a pointer or other materials to assist client in holding a brainspot in their visual field.  Therapist might also provide biolateral music through headphones to deepen the experience.         Patient has reviewed and agreed to the above plan.      Erika Colorado, LMFT  July 17, 2024

## 2024-09-26 NOTE — PROGRESS NOTES
Interventional Psychiatry Program   Treatment Resistant Depression (TRD) Group  [facility], [facility]        Patient: Randi Cleary (1955)    MRN: [id number]  Date of Treatment: September 17, 2024  Duration of Treatment: Start Time: 10:30 am; End Time: 12:00  Providers/Group Facilitators: Daniella Gaona, PhD, LP; Valdo Harper MA (PhD student)     Number of Participants: 7  Group Format: In Person     People present:   Providers: Daniella Gaona and Valdo Harper  Patient: Randi Cleary  Others: Other patients    Encounter Diagnoses   Name Primary?    Severe episode of recurrent major depressive disorder, without psychotic features (H) Yes    Generalized anxiety disorder        Assessment (current symptoms):       9/18/2024     2:27 PM 9/24/2024     9:53 AM 9/26/2024    11:52 AM   PHQ   PHQ-9 Total Score 12 12 15   Q9: Thoughts of better off dead/self-harm past 2 weeks Several days Not at all Several days   F/U: Thoughts of suicide or self-harm No  Yes   F/U: Self harm-plan   No   F/U: Self-harm action   No   F/U: Safety concerns No  No         7/17/2024     9:55 AM 9/9/2024     1:33 PM 9/24/2024     9:54 AM   CHAVO-7 SCORE   Total Score 10 (moderate anxiety) 13 (moderate anxiety) 9 (mild anxiety)   Total Score 10 13 9     The treatment resistant depression (TRD) group is based on the cognitive behavioral therapy model that uses psychoeducation, behavioral activation, mindfulness, supportive techniques, problem solving techniques, and social skills.       Inclusion criteria for group participation: Current patient in the [facility] TRD program; agreement to group format and guidelines discussed in first session     Group Session 3  Collect BADS  Check in:  Roses /Thorns about last 7 days; how did Intentional activities/values go/any reflections (HW) - 20 min  Mindfulness Exercise - 5-10 min  Does anyone want to share?  PsychoEd: Values - Goals -  Activities - 25 min  Assignments / Wrap Up - 30 min  Who wants to commit to an activity  Problem-solving potential obstacles     Description: Winnie arrived to group about 30min late. She contributed in a positive way and offered suggestions to other participants about physical activities (walking, Silver Sneaker classes). She shared that, recently,  I ve been smith-winkled into these medical tests.  The tests are physically taxing for her. The present therapists suggested to ask what the pros and cons are to having these tests done so she can have more agency in her care.     Patient's reaction to treatment strategies: positively engaged in group and helpful to others.     Patient's progress toward treatment goals: Pt expressed commitment to working in group therapy.     Planned activity: Did not have time for activity planning in this session.    Mental Status Exam:  Alertness: alert and oriented  Appearance: well groomed; less color in face; occasional productive coughing, reportedly due to COPD and not having had enough time in morning to clear her lungs  Behavior/Demeanor: cooperative and pleasant, with good eye contact   Speech: normal  Language: good. Preferred language identified as English.  Psychomotor: normal or unremarkable  Mood: depressed  Affect: restricted; was congruent to mood; was congruent to content  Thought Process/Associations: unremarkable  Perception: Reports intact    Insight: appears appropriate  Judgment: appears appropriate  Cognition: does appear grossly intact      Outpatient Treatment Plan     Today's Date: Sep 24, 2024              Date of 1st group meetin/10/24       Diagnosis:  Encounter Diagnoses   Name Primary?    Severe episode of recurrent major depressive disorder, without psychotic features (H) Yes    Generalized anxiety disorder        Current symptoms and circumstances that substantiate the diagnosis:  Persistent dysphoria, anhedonia, sleep disturbance, low energy/fatigue,  trouble concentrating, and interfering anxiety and irritability.     How symptoms and/or behaviors are affecting level of function:  Reduced social engagement/social isolation; minimal physical activity    Risk Assessment:  Suicide:  Assessed Level of Immediate Risk: Low  Ideation: endorsed thoughts of suicide or self-harm  Denies self-harm action  Plan:  denies plan for suicide and self-harm  Safety: denied safety concerns     Homicide/Violence:  Assessed Level of Immediate Risk: Low  Ideation: Denies  Plan: n/a  Means: No  Intent: No          SYMPTOMS; PROBLEMS   MEASURABLE GOALS;    FUNCTIONAL IMPROVEMENT INTERVENTIONS Gains Made:     Depression reduce depressive symptoms, reduce depressive episodes, and report feeling more positive about self   Increase engagement with value-driven activities BA group thergerald Pt has been actively engaged in group and used excellent social skills to connect with other group members     Frequency of Sessions: Weekly    Discharge and Aftercare Goals: see measurable goals above  Expected duration of treatment: 8 weeks  Participants in therapy plan (family, friends, support network): self     Patient verbally consented to the treatment plan above.    Valdo Harper MA  Practicum Therapist    I co-facilitated (and was present for the entire duration of) this group therapy session with the above trainee and discussed this participant in individual supervision. I agree with the note and plan as documented.     Supervisor: Daniella Gaona, PhD, LP

## 2024-09-26 NOTE — TELEPHONE ENCOUNTER
Message sent to  re: whether okay to proceed with routine screening colonoscopy following cardiac catheterization.   Cinda Barrett RN

## 2024-09-27 ENCOUNTER — ANESTHESIA EVENT (OUTPATIENT)
Dept: BEHAVIORAL HEALTH | Facility: CLINIC | Age: 69
End: 2024-09-27
Payer: MEDICARE

## 2024-09-27 ENCOUNTER — ANESTHESIA (OUTPATIENT)
Dept: BEHAVIORAL HEALTH | Facility: CLINIC | Age: 69
End: 2024-09-27
Payer: MEDICARE

## 2024-09-27 ENCOUNTER — HOSPITAL ENCOUNTER (OUTPATIENT)
Dept: BEHAVIORAL HEALTH | Facility: CLINIC | Age: 69
Discharge: HOME | End: 2024-09-27
Attending: PSYCHIATRY & NEUROLOGY
Payer: MEDICARE

## 2024-09-27 VITALS
RESPIRATION RATE: 18 BRPM | DIASTOLIC BLOOD PRESSURE: 79 MMHG | HEART RATE: 86 BPM | OXYGEN SATURATION: 93 % | SYSTOLIC BLOOD PRESSURE: 127 MMHG | TEMPERATURE: 98.1 F

## 2024-09-27 DIAGNOSIS — F33.2 SEVERE RECURRENT MAJOR DEPRESSION WITHOUT PSYCHOTIC FEATURES (H): Primary | ICD-10-CM

## 2024-09-27 PROCEDURE — 250N000011 HC RX IP 250 OP 636: Performed by: ANESTHESIOLOGY

## 2024-09-27 PROCEDURE — 370N000017 HC ANESTHESIA TECHNICAL FEE, PER MIN: Performed by: ANESTHESIOLOGY

## 2024-09-27 PROCEDURE — 90870 ELECTROCONVULSIVE THERAPY: CPT | Performed by: PSYCHIATRY & NEUROLOGY

## 2024-09-27 PROCEDURE — 250N000009 HC RX 250: Performed by: ANESTHESIOLOGY

## 2024-09-27 PROCEDURE — 90870 ELECTROCONVULSIVE THERAPY: CPT

## 2024-09-27 PROCEDURE — 250N000011 HC RX IP 250 OP 636: Performed by: PSYCHIATRY & NEUROLOGY

## 2024-09-27 RX ORDER — SODIUM CHLORIDE, SODIUM LACTATE, POTASSIUM CHLORIDE, CALCIUM CHLORIDE 600; 310; 30; 20 MG/100ML; MG/100ML; MG/100ML; MG/100ML
INJECTION, SOLUTION INTRAVENOUS CONTINUOUS
Status: DISCONTINUED | OUTPATIENT
Start: 2024-09-27 | End: 2024-09-28 | Stop reason: HOSPADM

## 2024-09-27 RX ORDER — FENTANYL CITRATE 50 UG/ML
50 INJECTION, SOLUTION INTRAMUSCULAR; INTRAVENOUS EVERY 5 MIN PRN
Status: DISCONTINUED | OUTPATIENT
Start: 2024-09-27 | End: 2024-09-28 | Stop reason: HOSPADM

## 2024-09-27 RX ORDER — ONDANSETRON 2 MG/ML
4 INJECTION INTRAMUSCULAR; INTRAVENOUS EVERY 30 MIN PRN
Status: DISCONTINUED | OUTPATIENT
Start: 2024-09-27 | End: 2024-09-28 | Stop reason: HOSPADM

## 2024-09-27 RX ORDER — ONDANSETRON 4 MG/1
4 TABLET, ORALLY DISINTEGRATING ORAL EVERY 30 MIN PRN
Status: DISCONTINUED | OUTPATIENT
Start: 2024-09-27 | End: 2024-09-28 | Stop reason: HOSPADM

## 2024-09-27 RX ORDER — NALOXONE HYDROCHLORIDE 0.4 MG/ML
0.1 INJECTION, SOLUTION INTRAMUSCULAR; INTRAVENOUS; SUBCUTANEOUS
Status: DISCONTINUED | OUTPATIENT
Start: 2024-09-27 | End: 2024-09-28 | Stop reason: HOSPADM

## 2024-09-27 RX ORDER — HYDROMORPHONE HCL IN WATER/PF 6 MG/30 ML
0.2 PATIENT CONTROLLED ANALGESIA SYRINGE INTRAVENOUS EVERY 5 MIN PRN
Status: DISCONTINUED | OUTPATIENT
Start: 2024-09-27 | End: 2024-09-28 | Stop reason: HOSPADM

## 2024-09-27 RX ORDER — KETOROLAC TROMETHAMINE 30 MG/ML
30 INJECTION, SOLUTION INTRAMUSCULAR; INTRAVENOUS ONCE
Status: COMPLETED | OUTPATIENT
Start: 2024-09-27 | End: 2024-09-27

## 2024-09-27 RX ORDER — KETOROLAC TROMETHAMINE 30 MG/ML
30 INJECTION, SOLUTION INTRAMUSCULAR; INTRAVENOUS ONCE
Start: 2024-09-27 | End: 2024-09-27

## 2024-09-27 RX ORDER — HYDROMORPHONE HCL IN WATER/PF 6 MG/30 ML
0.4 PATIENT CONTROLLED ANALGESIA SYRINGE INTRAVENOUS EVERY 5 MIN PRN
Status: DISCONTINUED | OUTPATIENT
Start: 2024-09-27 | End: 2024-09-28 | Stop reason: HOSPADM

## 2024-09-27 RX ORDER — FENTANYL CITRATE 50 UG/ML
25 INJECTION, SOLUTION INTRAMUSCULAR; INTRAVENOUS EVERY 5 MIN PRN
Status: DISCONTINUED | OUTPATIENT
Start: 2024-09-27 | End: 2024-09-28 | Stop reason: HOSPADM

## 2024-09-27 RX ORDER — METHOHEXITAL IN WATER/PF 100MG/10ML
SYRINGE (ML) INTRAVENOUS PRN
Status: DISCONTINUED | OUTPATIENT
Start: 2024-09-27 | End: 2024-09-27

## 2024-09-27 RX ORDER — DEXAMETHASONE SODIUM PHOSPHATE 4 MG/ML
4 INJECTION, SOLUTION INTRA-ARTICULAR; INTRALESIONAL; INTRAMUSCULAR; INTRAVENOUS; SOFT TISSUE
Status: DISCONTINUED | OUTPATIENT
Start: 2024-09-27 | End: 2024-09-28 | Stop reason: HOSPADM

## 2024-09-27 RX ADMIN — Medication 100 MG: at 12:19

## 2024-09-27 RX ADMIN — KETOROLAC TROMETHAMINE 30 MG: 30 INJECTION, SOLUTION INTRAMUSCULAR at 12:02

## 2024-09-27 RX ADMIN — SUCCINYLCHOLINE CHLORIDE 90 MG: 20 INJECTION, SOLUTION INTRAMUSCULAR; INTRAVENOUS; PARENTERAL at 12:19

## 2024-09-27 NOTE — ANESTHESIA POSTPROCEDURE EVALUATION
Patient: Randi Cleary    Procedure: * No procedures listed *  Electroconvulsive Therapy    Anesthesia Type:  General    Note:  Disposition: Outpatient   Postop Pain Control: Uneventful            Sign Out: Well controlled pain   PONV: No   Neuro/Psych: Uneventful            Sign Out: Acceptable/Baseline neuro status   Airway/Respiratory: Uneventful            Sign Out: Acceptable/Baseline resp. status   CV/Hemodynamics: Uneventful            Sign Out: Acceptable CV status; No obvious hypovolemia; No obvious fluid overload   Other NRE:    DID A NON-ROUTINE EVENT OCCUR?            Last vitals:  Vitals:    09/27/24 1138 09/27/24 1226   BP: 109/87 112/67   Pulse: 90 79   Resp: 18 15   Temp: 36.3  C (97.3  F) 36.3  C (97.4  F)   SpO2: 94% 94%       Electronically Signed By: Carmen Saldana MD  September 27, 2024  12:36 PM

## 2024-09-27 NOTE — DISCHARGE INSTRUCTIONS
ECT Discharge Instructions      During your ECT series:    Do not drive or work heavy equipment until 7 days after your last treatment.  Do not drink alcohol or use street drugs (illicit drugs) while you are having treatments.  Do not make important decisions, including legal decisions.    After each treatment:    Get plenty of rest. A responsible adult must stay with your for at least 6 hours.  Avoid heavy or risky activities for 24 hours while in maintenance ECT.   Do not drive for at least 24 hours after your treatment while in maintenance ECT.   If you have more than one treatment within one week, do not drive for 7 days after your last treatment.   If you feel light-headed, sit for a few minutes before standing. Have someone help you get up.  If you have nausea (feel sick to your stomach): Drink only clear liquids such as apple juice, ginger ale, broth or 7UP, Be sure to drink plenty of liquids. Move to a normal diet as you feel able.   If you received Toradol, wait 6 hours before taking ibuprofen.  Call your doctor if:   You have a fever over 100F (37.7 C) (taken under the tongue), or a fever that last more than 24 hours.  Your IV site is red, swollen, very painful or is getting more tender.  You have nausea that gets very bad or does not improve.    If you have any symptoms after ECT, tell our staff before your next treatment.  The ECT Department can be reached at [phone].  The ECT Department is open Mondays, Wednesdays and Fridays from 7:00 AM to 2:00 PM.    To speak to a doctor, call:  Your primary care provider or [facility] for Dr. Beavers, Dr. Hutchison or Dr. Cotter PHONE: [phone]; fax: [phone]    New instructions:  - Plan for maintenance [id number]. 10/11/24    Discharge instructions:               -- Start your trazodone to support your recovery and ease insomnia              -- Remember to NOT take gabapentin after 6pm the night before each treatment  -- You will have to check to  make sure somebody can drive you to and from the hospital and monitor you for 6 hours after each treatment  -- Maintenance ECT  every two weeks, then to once every month.  The goal of maintenance ECT is to space out and eventually stop  -- During maintenance, you can't drive for 24 hours after each treatment.     - We discussed other alternatives including trying ketamine through the [facility] or staying on your regular medications and therapy, but at the moment you were most interested in pursuing maintenance    - Per Dr Varela:   - Consider trial of serotonin modulator (e.g., vilazodone vs. vortioxetine) or novel agent Auvelity  - Consider augmentation with atypical antipsychotic (e.g., quetiapine or aripiprazole), though there are concerns given pre-diabetes  You have received Toradol today at 12:02 pm. Toradol is an NSAID.  Do not take any NSAID's including: Ibuprofen, Naproxen Sodium, Asprin, Advil, Motrin, Aleve, Shannan, etc., until six hours after the above time which would be 6:02 pm. If you have any questions check back with your Physician, or pharmacist.     Covid-19 Testing:  We are no longer requiring covid tests prior to your procedure. If you are ill, please call the ECT department to discuss plan for rescheduling your appointment. [phone]    Please come to the [facility] and check-in with Security before your ECT appointment.  The [facility] is located right next to the Adult Emergency Room.  The address is [address] [place] [address].    After your appointment, you may be picked up at the [facility] entrance, which is located at [address] [place] [address], about 1 block North of the [facility].    Transported by:   Aleida family member    Reviewed AVS discharge instructions with:   Aleida family

## 2024-09-27 NOTE — ANESTHESIA PREPROCEDURE EVALUATION
Anesthesia Pre-Procedure Evaluation    Patient: Randi Cleary   MRN: [id number] : 1955        Procedure : * No procedures listed *  Electroconvulsive Therapy       Past Medical History:   Diagnosis Date    Bipolar 2 disorder (H)     Breast cancer (H)     lumpectomy, radiation, chemo    Chronic pain syndrome     COPD (chronic obstructive pulmonary disease) (H)     asthma    Cord compression (H) 2021    Dizzy     Drug tolerance     opioid    Esophageal reflux     Fatigue     Generalized anxiety disorder     Graves disease     Hemochromatosis 2018    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; H63D not detected    History of breast cancer 2020    Formatting of this note might be different from the original. Created by Conversion  Replacement Utility updated for latest IMO load Formatting of this note might be different from the original. Created by Conversion  Replacement Utility updated for latest IMO load    History of corticosteroid therapy 2019    History of partial adrenalectomy (H) 2019    History of pheochromocytoma 2019    Hx antineoplastic chemotherapy     Hx of radiation therapy     Hyperlipidaemia     Hypertension     Impaired fasting glucose     Injury of neck, whiplash 07/15/2021    Joint pain     KYAW (obstructive sleep apnea) 2016    Osteopenia     Pheochromocytoma, left 2017    laparoscopically removed    Postablative hypothyroidism 1995    Prediabetes 10/03/2019    by A1c    Psoriasis     Psoriatic arthropathy (H)     Pulmonary hypertension (H)     Right rotator cuff tear     RLS (restless legs syndrome)     on ropinorole    Sacroiliitis (H)     Serotonin syndrome 2020    [facility] - While on desvenlafaxine 100mg    Snoring     Spinal stenosis     Status post coronary angiogram 10/03/2019    Urinary incontinence     Vitamin B 12 deficiency 2009    Vitamin D deficiency 2010      Past Surgical History:   Procedure Laterality Date    ARTHRODESIS ANKLE       ARTHROPLASTY ANKLE Right 6/29/2015    Procedure: ARTHROPLASTY ANKLE;  Surgeon: Jason Coughlin MD;  Location: [facility]    ARTHROPLASTY REVISION ANKLE Right 6/29/2015    Procedure: ARTHROPLASTY REVISION ANKLE;  Surgeon: Jason Coughlin MD;  Location:  [facility]    BIOPSY BREAST      BREAST BIOPSY, CORE RT/LT      COLONOSCOPY      COLONOSCOPY N/A 2/25/2021    Procedure: COLONOSCOPY;  Surgeon: Guru Elke Tolbert MD;  Location:  GI    CV CORONARY ANGIOGRAM N/A 10/3/2019    Procedure: CV CORONARY ANGIOGRAM;  Surgeon: Bryce Pierre MD;  Location:  HEART CARDIAC CATH LAB    CV RIGHT HEART CATH MEASUREMENTS RECORDED N/A 10/3/2019    Procedure: CV RIGHT HEART CATH;  Surgeon: Bryce Pierre MD;  Location:  HEART CARDIAC CATH LAB    CV RIGHT HEART CATH MEASUREMENTS RECORDED N/A 9/25/2024    Procedure: Heart Cath Right Heart Cath;  Surgeon: George Becker MD;  Location:  HEART CARDIAC CATH LAB    ESOPHAGOSCOPY, GASTROSCOPY, DUODENOSCOPY (EGD), COMBINED N/A 2/25/2021    Procedure: ESOPHAGOGASTRODUODENOSCOPY, WITH BIOPSY;  Surgeon: Guru Elke Tolbert MD;  Location:  GI    EYE SURGERY  2021    HC REMOVE TONSILS/ADENOIDS,<11 Y/O      Description: Tonsillectomy With Adenoidectomy;  Recorded: 04/07/2010;    IR LUMBAR EPIDURAL STEROID INJECTION  10/26/2004    IR LUMBAR EPIDURAL STEROID INJECTION  11/16/2004    IR LUMBAR EPIDURAL STEROID INJECTION  12/21/2004    IR LUMBAR EPIDURAL STEROID INJECTION  6/8/2006    JOINT REPLACEMENT      LAMINOPLASTY CERVICAL POSTERIOR THREE+ LEVELS Left 12/21/2021    Procedure: CERVICAL 3-CERVICAL 6 LEFT OPEN DOOR LAMINOPLASTY AND LEFT CERVICAL 4-5 AND CERVICAL 6-7 POSTERIOR FORAMINOTOMY;  Surgeon: Angela Gregory MD;  Location: [facility] OR    LAPAROSCOPIC ADRENALECTOMY Left 08/02/2017    pheochromocytoma    LAPAROSCOPIC ADRENALECTOMY Left 8/2/2017    Procedure: LAPAROSCOPIC LEFT ADRENALECTOMY, ;  Surgeon: Gab Linares MD;   "Location: [facility] OR;  Service:     LENGTHEN TENDON ACHILLES Right 2015    Procedure: LENGTHEN TENDON ACHILLES;  Surgeon: Jason Coughlin MD;  Location: [facility]    LUMPECTOMY BREAST      LUMPECTOMY BREAST Left 1994    MAMMOPLASTY REDUCTION Right 2015    [place]    MAMMOPLASTY REDUCTION Right     approx late     MASTECTOMY      left lumpectomy with axillary node dissection    MASTECTOMY MODIFIED RADICAL      OTHER SURGICAL HISTORY Right     reconstructive breast surgery    OTHER SURGICAL HISTORY      Adrenalectomy for pheochromocytoma    ME MASTECTOMY, MODIFIED RADICAL      Description: Modified Radical Mastectomy Left Breast;  Recorded: 2010;    REPAIR HAMMER TOE Right 2015    Procedure: REPAIR HAMMER TOE;  Surgeon: Jason Coughlin MD;  Location: [facility]    TONSILLECTOMY      TONSILLECTOMY & ADENOIDECTOMY      ZZC ARTHRODESIS,ANKLE,OPEN Right     Description: Ankle Arthrodesis;  Recorded: 2010;      Allergies   Allergen Reactions    Serotonin Reuptake Inhibitors (Ssris) Anxiety, Difficulty breathing, Headache, Palpitations and Shortness Of Breath    Buspirone      The patient states she had serotonin syndrome    Cephalexin      Other reaction(s): unknown rxn.    Desvenlafaxine      Serotonin syndrome    Diclofenac Sodium [Diclofenac]      Serotonin syndrome and restless legs syndrome    Gabapentin      Drove on the wrong side of the highway    Levofloxacin      \"CAN'T REMEMBER\"    Penicillins      \"SORES IN MOUTH\"    Riluzole Difficulty breathing and Swelling    Sulfa Antibiotics      \"PT DOES NOT KNOW WHAT THE REACTION WAS\"    Topiramate Other (See Comments)     Frequent urination      Social History     Tobacco Use    Smoking status: Former     Current packs/day: 0.00     Average packs/day: 2.5 packs/day for 29.2 years (72.9 ttl pk-yrs)     Types: Cigarettes     Start date: 1971     Quit date: 2000     Years since quittin.2     Passive exposure: " Past    Smokeless tobacco: Never   Substance Use Topics    Alcohol use: Not Currently     Comment: relapse 09/2021 sober       Wt Readings from Last 1 Encounters:   09/25/24 77.9 kg (171 lb 11.2 oz)        Anesthesia Evaluation            ROS/MED HX  ENT/Pulmonary:       Neurologic:       Cardiovascular:     (+)  - -   -  - -                                pulmonary hypertension,      METS/Exercise Tolerance:     Hematologic:       Musculoskeletal:       GI/Hepatic:       Renal/Genitourinary:       Endo:     (+)  type II DM,        thyroid problem,     Obesity,       Psychiatric/Substance Use:       Infectious Disease:       Malignancy:       Other:            Physical Exam    Airway        Mallampati: II   TM distance: > 3 FB   Neck ROM: full   Mouth opening: > 3 cm    Respiratory Devices and Support         Dental       (+) Multiple crowns, permanant bridges      Cardiovascular   cardiovascular exam normal          Pulmonary   pulmonary exam normal                OUTSIDE LABS:  CBC:   Lab Results   Component Value Date    WBC 9.9 09/25/2024    WBC 6.8 08/22/2024    HGB 15.6 09/25/2024    HGB 17.2 (H) 09/25/2024    HCT 50.6 (H) 09/25/2024    HCT 49.3 (H) 08/22/2024     09/25/2024     08/22/2024     BMP:   Lab Results   Component Value Date     09/25/2024     08/22/2024    POTASSIUM 4.6 09/25/2024    POTASSIUM 4.3 08/22/2024    CHLORIDE 102 09/25/2024    CHLORIDE 106 08/22/2024    CO2 28 09/25/2024    CO2 27 08/22/2024    BUN 10.2 09/25/2024    BUN 11.4 08/22/2024    CR 0.54 09/25/2024    CR 0.57 08/22/2024    GLC 99 09/25/2024    GLC 94 08/22/2024     COAGS:   Lab Results   Component Value Date    PTT 34 12/13/2021    INR 1.01 09/25/2024     POC:   Lab Results   Component Value Date     (H) 02/25/2021     HEPATIC:   Lab Results   Component Value Date    ALBUMIN 4.0 08/22/2024    PROTTOTAL 6.8 08/22/2024    ALT 14 08/22/2024    AST 22 08/22/2024    ALKPHOS 72 08/22/2024    BILITOTAL  "0.5 08/22/2024     OTHER:   Lab Results   Component Value Date    A1C 5.7 (H) 08/21/2024    LINDA 9.8 09/25/2024    MAG 1.9 05/22/2024    TSH 1.69 06/04/2024    T4 1.49 03/29/2024    T3 114 01/18/2023    CRP <2.9 11/16/2021    SED 8 10/17/2023       Anesthesia Plan    ASA Status:  3       Anesthesia Type: General.   Induction: Intravenous.           Consents            Postoperative Care            Comments:               Carmen Saldana MD    I have reviewed the pertinent notes and labs in the chart from the past 30 days and (re)examined the patient.  Any updates or changes from those notes are reflected in this note.              # Overweight: Estimated body mass index is 27.71 kg/m  as calculated from the following:    Height as of 9/25/24: 1.676 m (5' 6\").    Weight as of 9/25/24: 77.9 kg (171 lb 11.2 oz).      "

## 2024-09-27 NOTE — ANESTHESIA CARE TRANSFER NOTE
Patient: Randi Cleary    Procedure: * No procedures listed *  Electroconvulsive Therapy    Diagnosis: * No pre-op diagnosis entered *  Diagnosis Additional Information: No value filed.    Anesthesia Type:   General     Note:    Oropharynx: spontaneously breathing  Level of Consciousness: drowsy  Oxygen Supplementation: room air    Independent Airway: airway patency satisfactory and stable  Dentition: dentition unchanged  Vital Signs Stable: post-procedure vital signs reviewed and stable  Report to RN Given: handoff report given  Patient transferred to: PACU    Handoff Report: Identifed the Patient, Identified the Reponsible Provider, Reviewed the pertinent medical history, Discussed the surgical course, Reviewed Intra-OP anesthesia mangement and issues during anesthesia, Set expectations for post-procedure period and Allowed opportunity for questions and acknowledgement of understanding      Vitals:  Vitals Value Taken Time   /67 09/27/24 1226   Temp 36.3  C (97.4  F) 09/27/24 1226   Pulse 79 09/27/24 1226   Resp 15 09/27/24 1226   SpO2 94 % 09/27/24 1226       Electronically Signed By: Carmen Saldana MD  September 27, 2024  12:36 PM
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
## 2024-09-29 ENCOUNTER — TELEPHONE (OUTPATIENT)
Dept: CARDIOLOGY | Facility: CLINIC | Age: 69
End: 2024-09-29
Payer: MEDICARE

## 2024-09-29 NOTE — TELEPHONE ENCOUNTER
Ms. Cathy Cleary called [facility] for shortness of breath    She has had a right heart cath on 9/25/2024 which was uncomplicated.  After the right heart cath she sustained a fall and she went to outside hospital where CT head and CT spine did not show any injuries.  After this fall she started having increasing amounts of shortness of breath and worsening chest pain when she takes deep breaths on her left side.  She also says that the left side of her chest was tender to the touch.    At this time, whether or musculoskeletal pain is contributing to shortness of breath or if there is an underlying problem such as rib fracture or pneumothorax is uncertain and cannot be answered via phone call.  Recommended her to go to the emergency room

## 2024-09-30 ENCOUNTER — TELEPHONE (OUTPATIENT)
Dept: BEHAVIORAL HEALTH | Facility: CLINIC | Age: 69
End: 2024-09-30
Payer: MEDICARE

## 2024-09-30 ENCOUNTER — TELEPHONE (OUTPATIENT)
Dept: GASTROENTEROLOGY | Facility: CLINIC | Age: 69
End: 2024-09-30
Payer: MEDICARE

## 2024-09-30 NOTE — TELEPHONE ENCOUNTER
Pt called TC and LVM 9/27/2024 stating without therapist until 10/30. Requested call back.     Coordinator returned call. Said would like to get vide visit therapy appointment. Said she fell down stairs and is in a rough spot, got medical attention already. Scheduled next day appointment with Yissel. Patient saw Yevgeniy in past.    Cassidy Baker  Transition Clinic Coordinator  09/30/24 12:14 PM

## 2024-09-30 NOTE — TELEPHONE ENCOUNTER
Caller: Randi Cleary   Reason for Reschedule/Cancellation (please be detailed, any staff messages or encounters to note?):     Per pt - fell/unable to breath       Prior to reschedule please review:  Ordering Provider: Guru Elke Tolbert MD    Sedation Determined: mac   Does patient have any ASC Exclusions, please identify?: n       Notes on Cancelled Procedure:  Procedure:Lower Endoscopy [Colonoscopy]   Date: 10/01/2024  Location:[facility]; [address] [place] [address]  Surgeon:        Rescheduled: yes   Procedure: Lower Endoscopy [Colonoscopy]  Date: 11/05/2024  Location: [facility]; [address], [address] [place] [address]   Surgeon:    Sedation Level Scheduled  Mac          Reason for Sedation Level per order   Prep/Instructions updated and sent:  mychart     Does patient need PAC or Pre -Op Rescheduled? :  n        Send In - basket message to Panc - Yunior Pool if EUS procedure is canceled or rescheduled: [ N/A, YES or NO] n                      Reviewed nurse's notes and I agree.  SUBJECTIVE:  Viridiana Saldana is an 41 year old female who presents with an illness characterized   by productive cough with sputum described as yellow, chest congestion, nasal congestion, rhinorrhea, rattling in chest and wheezing. Symptoms began 2 weeks ago and are   been gradually worsening since that time.  Past history is significant for occasional episodes of bronchitis     Past Medical History:   Diagnosis Date   • C. difficile enteritis 02/07/2017   • Chlamydia     in teens   • Dyspepsia 04/12/2017   • Functional diarrhea 04/12/2017   • Gastroesophageal reflux disease    • Gonorrhea     in teens   • Trichmonas 10/2015   • Trichomonas     in teens   • Unspecified sinusitis (chronic)          Current Outpatient Prescriptions   Medication Sig Dispense Refill   • azithromycin (ZITHROMAX Z-OSMANY) 250 MG tablet 2 tablets day one, then 1 tablet days 2-5 6 tablet 0   • predniSONE (DELTASONE) 20 MG tablet 2 tabs daily x 5 days 10 tablet 0   • guaiFENesin-codeine (CHERATUSSIN AC) 100-10 MG/5ML liquid Take 5 mLs by mouth 3 times daily as needed for Cough. 120 mL 0     No current facility-administered medications for this visit.      ALLERGIES:  No Known Allergies    Social History   Substance Use Topics   • Smoking status: Never Smoker   • Smokeless tobacco: Never Used   • Alcohol use 0.0 oz/week      Comment: occasional       ROS: GI :  denies complaints of :, nausea, vomiting  :   denies complaints of:, dysuria      OBJECTIVE:  Visit Vitals  /68   Pulse 68   Temp 98.1 °F (36.7 °C)   Wt 75.5 kg   BMI 31.44 kg/m²     General appearance: Healthy  Ears: R TM - normal, L TM - normal  Nose: normal  Oropharynx: normal  Neck: Normal and no adenopathy  Lungs: Scattered bilateral wheezing  Heart: regular rate and rhythm and no murmurs, clicks, or gallops    ASSESSMENT:  Acute bronchitis with bronchospasm  PLAN:  1)  azithromycin as directed. Prednisone 40 mg daily ×5 days.  Cheratussin for cough.  2)  Rest, fluids, acetaminophen, and humidification.  3)  Recheck prn persistence, worsening, appearance of new symptoms.

## 2024-09-30 NOTE — TELEPHONE ENCOUNTER
FUTURE VISIT INFORMATION      SURGERY INFORMATION:  Date: 24  Location:  [facility]  Surgeon:  Guru Elke Tolbert MD   Anesthesia Type:  mac  Procedure: Colonoscopy     RECORDS REQUESTED FROM:       Primary Care Provider: [facility]    Pertinent Medical History: jose maria, copd, hypertension    Most recent EKG+ Tracin24    Most recent ECHO: 10/5/23    Most recent Cardiac Stress Test: 21    Most recent Coronary Angiogram:    Most recent PFT's: 21

## 2024-10-01 ENCOUNTER — MYC MEDICAL ADVICE (OUTPATIENT)
Dept: PSYCHIATRY | Facility: CLINIC | Age: 69
End: 2024-10-01
Payer: MEDICARE

## 2024-10-01 ENCOUNTER — VIRTUAL VISIT (OUTPATIENT)
Dept: BEHAVIORAL HEALTH | Facility: CLINIC | Age: 69
End: 2024-10-01
Payer: MEDICARE

## 2024-10-01 DIAGNOSIS — F33.2 SEVERE RECURRENT MAJOR DEPRESSION WITHOUT PSYCHOTIC FEATURES (H): Primary | ICD-10-CM

## 2024-10-01 PROCEDURE — 99207 PR NO CHARGE LOS: CPT | Mod: 95 | Performed by: PSYCHOLOGIST

## 2024-10-01 NOTE — TELEPHONE ENCOUNTER
Writer called patient's pharmacy to verify insurance information:    RxBIN: [id number]  Group: [id number]  ID: [id number]  PCN: [id number]    Attempted to submit prior authorization via Epic and received the follow note:  [facility] is not able to process this request through [employer], please contact the plan at [phone] or fax in request to [phone].

## 2024-10-01 NOTE — PROGRESS NOTES
Transition Clinic - Initial Visit Progress Note    Patient  Name: Randi Cleary, : 1955    Date:  10/1/2024       Service Type:  Mental Health Initial Visit       VISIT TIME START: 160  VISIT TIME END: 170   55 min   Session Length:   TC Session Length: 60 (53+ Minutes)    Visit #: 1    Attendees:  TC Attendees: Client alone    Service Modality:  Service Modality: Video Visit:      Provider verified identity through the following two step process.  Patient provided:  Patient  and Patient address    Telemedicine Visit: The patient's condition can be safely assessed and treated via synchronous audio and visual telemedicine encounter.      Reason for Telemedicine Visit: Patient convenience (e.g. access to timely appointments / distance to available provider)    Originating Site (Patient Location): Patient's home    Distant Site (Provider Location): Provider Remote Setting- Home Office    Consent:  The patient/guardian has verbally consented to: the potential risks and benefits of telemedicine (video visit) versus in person care; bill my insurance or make self-payment for services provided; and responsibility for payment of non-covered services.     Patient would like the video invitation sent by:  My Chart    Mode of Communication:  Video Conference via [facility]    Distant Location (Provider):  Off-site    As the provider I attest to compliance with applicable laws and regulations related to telemedicine.    Source of Referral:  Other      Informed Consent and Assessment Methods    This provider and patient discussed HIPAA, and the limits of confidentiality; including mandated reporting, the possibility of collaborative discussions with patient's primary care provider and the multidisciplinary team in the MH clinic during consultation.  Discussed the no show policy, and the benefits and possible unintended consequences of therapy.  Patient indicated understanding Transition Clinic services are short  "term, solution focused, until a referral can be made and patient can bridge to long term therapy.  Patient verbally indicated understanding the informed consent.         Presenting Concerns/  Current Stressors:  Randi Cleary is a 69 year old identified female who was referred to the Transition Clinic by individual therapist  for follow up mental health treatment as the patient is unable to get in with her individual therapist until 10/30.  Randi Cleary reports : \"I am really having problems.\"  Patient reported she receives ECT.    I have no family anymore except my  of 50 years.   I have been disabled for 30 years.  It is all encompassing.      ASSESSMENT:    Required Screenings: The following assessments were completed by patient for this visit:  PHQ9:       9/9/2024     1:30 PM 9/12/2024     8:49 AM 9/16/2024    12:47 PM 9/18/2024     2:27 PM 9/24/2024     9:53 AM 9/26/2024    11:52 AM 10/1/2024    11:58 AM   PHQ-9 SCORE   PHQ-9 Total Score MyChart 15 (Moderately severe depression) 13 (Moderate depression) 16 (Moderately severe depression) 12 (Moderate depression) 12 (Moderate depression) 15 (Moderately severe depression) 14 (Moderate depression)   PHQ-9 Total Score 15 13 16    16 12 12 15 14     GAD7:       5/15/2024    11:25 AM 6/6/2024     8:00 AM 6/27/2024    12:48 PM 7/1/2024    12:10 PM 7/17/2024     9:55 AM 9/9/2024     1:33 PM 9/24/2024     9:54 AM   CHAVO-7 SCORE   Total Score 9 (mild anxiety)  11 (moderate anxiety)  10 (moderate anxiety) 13 (moderate anxiety) 9 (mild anxiety)   Total Score 9    9    9    9 9 11    11    11    11    11 11 10 13 9     CAGE-AID:       6/22/2020     7:12 PM 1/18/2022    11:15 PM 6/6/2024     8:00 AM   CAGE-AID Total Score   Total Score 4 4 4   Total Score MyChart 4 (A total score of 2 or greater is considered clinically significant) 4 (A total score of 2 or greater is considered clinically significant)      PROMIS 10-Global Health (all questions and answers " displayed):       5/1/2024    11:53 AM 5/15/2024    11:27 AM 6/6/2024     8:00 AM 7/1/2024    12:10 PM 7/10/2024     9:53 AM 7/17/2024    10:01 AM 10/1/2024    12:02 PM   PROMIS 10   In general, would you say your health is: Fair Fair   Fair Fair Fair   In general, would you say your quality of life is: Poor Poor   Good Poor Poor   In general, how would you rate your physical health? Fair Fair   Fair Fair Poor   In general, how would you rate your mental health, including your mood and your ability to think? Fair Fair   Fair Fair Fair   In general, how would you rate your satisfaction with your social activities and relationships? Fair Poor   Poor Poor Poor   In general, please rate how well you carry out your usual social activities and roles Poor Poor   Poor Fair Poor   To what extent are you able to carry out your everyday physical activities such as walking, climbing stairs, carrying groceries, or moving a chair? Moderately A little   Moderately A little Moderately   In the past 7 days, how often have you been bothered by emotional problems such as feeling anxious, depressed, or irritable? Often Sometimes   Sometimes Often Often   In the past 7 days, how would you rate your fatigue on average? Severe Severe   Mild Moderate Moderate   In the past 7 days, how would you rate your pain on average, where 0 means no pain, and 10 means worst imaginable pain? 7 8   7 7 8   In general, would you say your health is: 2 2 2 3 2 2 2   In general, would you say your quality of life is: 1 1 2 2 3 1 1   In general, how would you rate your physical health? 2 2 2 2 2 2 1   In general, how would you rate your mental health, including your mood and your ability to think? 2 2 2 3 2 2 2   In general, how would you rate your satisfaction with your social activities and relationships? 2 1 1 1 1 1 1   In general, please rate how well you carry out your usual social activities and roles. (This includes activities at home, at work and in  "your community, and responsibilities as a parent, child, spouse, employee, friend, etc.) 1 1 1 1 1 2 1   To what extent are you able to carry out your everyday physical activities such as walking, climbing stairs, carrying groceries, or moving a chair? 3 2 2 3 3 2 3   In the past 7 days, how often have you been bothered by emotional problems such as feeling anxious, depressed, or irritable? 4 3 3 4 3 4 4   In the past 7 days, how would you rate your fatigue on average? 4 4 4 2 2 3 3   In the past 7 days, how would you rate your pain on average, where 0 means no pain, and 10 means worst imaginable pain? 7 8 8 7 7 7 8   Global Mental Health Score 7    7    7 7    7 8 8 9    9    9    9    9    9 6 6   Global Physical Health Score 9    9    9 8    8 8 11 11    11    11    11    11    11 9 9   PROMIS TOTAL - SUBSCORES 16    16    16 15    15 16 19 20    20    20    20    20    20 15 15     [place] Suicide Severity Rating Scale (Lifetime/Recent)      5/5/2020     9:21 AM 6/11/2020    10:00 AM 1/9/2023     1:00 PM 5/21/2024     4:49 PM 5/21/2024     9:00 PM 6/6/2024     8:00 AM 7/10/2024    12:00 PM   [place] Suicide Severity Rating (Lifetime/Recent)   Q1 Wish to be Dead (Lifetime) Yes Yes   Yes     Comments \"When I was going through a couple of  years of counseling from a dysfunctional family about 30 years ago or more\" when patient was in treatment and there were family concerns   OD on medications     Q2 Non-Specific Active Suicidal Thoughts (Lifetime) Yes Yes   No     Non-Specific Active Suicidal Thought Description (Lifetime) Had thoughts of killing self         Most Severe Ideation Rating (Lifetime) 5 5   5     Most Severe Ideation Description (Lifetime) 35 years ago \"I just wanted to check out , I had thought of it so much and wanted to be done\" when family problems were happening   OD on medication     Frequency (Lifetime) 4    3     Duration (Lifetime) 2    3     Controllability (Lifetime) 3 0   2   "   Protective Factors  (Lifetime) 2 5   4     Reasons for Ideation (Lifetime) 5    5     Q1 Wished to be Dead (Past Month)   yes 1-->yes 1-->yes 1-->yes    Q2 Suicidal Thoughts (Past Month)   no 1-->yes 1-->yes 1-->yes    Q3 Suicidal Thought Method   no 0-->no 0-->no 1-->yes    Q4 Suicidal Intent without Specific Plan   no 1-->yes 0-->no 0-->no    Q5 Suicide Intent with Specific Plan   no 0-->no 0-->no 1-->yes    Q6 Suicide Behavior (Lifetime)   yes 1-->yes 0-->no 1-->yes    If yes to Q6, within past 3 months?   no 1-->yes 0-->no 0-->no    Level of Risk per Screen   moderate risk high risk moderate risk high risk    RETIRED: 1. Wish to be Dead (Recent) No No        RETIRED: 2. Non-Specific Active Suicidal Thoughts (Recent) No No        3. Active Suicidal Ideation with any Methods (Not Plan) Without Intent to Act (Lifetime) Yes Yes        RETIRE: Active Suicidal Ideation with any Methods (Not Plan) Description (Lifetime) Took many pills of Prozac and was sent to the ED 30 years prior        RETIRED: 3. Active Suicidal Ideation with any Methods (Not Plan) Without Intent to Act (Recent) No         RETIRE: 4. Active Suicidal Ideation with Some Intent to Act, Without Specific Plan (Lifetime) Yes Yes        RETIRE: Active Suicidal Ideation with Some Intent to Act, Without Specific Plan Description (Lifetime) Took many pills of Prozac and was sent to the ED         4. Active Suicidal Ideation with Some Intent to Act, Without Specific Plan (Recent) No         RETIRE: 5. Active Suicidal Ideation with Specific Plan and Intent (Lifetime) Yes Yes        RETIRE: Active Suicidal Ideation with Specific Plan and Intent Description (Lifetime) Took many pills of Prozac and was sent to the ED over 30 years ago         RETIRED: 5. Active Suicidal Ideation with Specific Plan and Intent (Recent) No         Most Severe Ideation Rating (Past Month) NA         Frequency (Past Month) NA         Duration (Past Month) NA         Controllability  (Past Month) NA         Protective Factors (Past Month) NA         Reasons for Ideation (Past Month) NA         Actual Attempt (Lifetime) Yes Yes        Actual Attempt Description (Lifetime) Took a bunch of pills patient reported overdosing on Prozac about thirty years ago        Total Number of Actual Attempts (Lifetime) 1 1        Actual Attempt (Past 3 Months) No No        Has subject engaged in non-suicidal self-injurious behavior? (Lifetime) Yes Yes        Has subject engaged in non-suicidal self-injurious behavior? (Past 3 Months) No No        Interrupted Attempts (Lifetime) No No        Interrupted Attempts (Past 3 Months) No No        Aborted or Self-Interrupted Attempt (Lifetime) No No        Total Number Aborted or Self Interrupted Attempts (Lifetime) 0         Aborted or Self-Interrupted Attempt (Past 3 Months) No No        Preparatory Acts or Behavior (Lifetime) No No        Preparatory Acts or Behavior (Past 3 Months) No No        Most Recent Attempt Date 10/1/1984         Comments  30 years prior        Most Recent Attempt Actual Lethality Code 2 0        Comments This is a guess/pt. doesn't recall exact month or day         Most Lethal Attempt Actual Lethality Code 2         Comments only 1 attempt/same attempt as most recent & initial         Initial/First Attempt Date 10/1/1984         Initial/First Attempt Actual Lethality Code 2         Q1 Wish to be Dead (Lifetime)       N   Q2 Non-Specific Active Suicidal Thoughts (Lifetime)       N       Mental Status Assessment:  Appearance:   Appropriate   Eye Contact:   Good   Psychomotor Behavior: Normal   Attitude:   Cooperative  Pleasant  Orientation:   All  Speech     Rate / Production:  Normal/ Responsive     Volume:   Normal   Mood:    Sad   Affect:    Appropriate   Thought Content:  Clear   Thought Form:  Coherent   Insight:    Good       Safety Issues and Plan for Safety and Risk Management:     Patient denies current fears or concerns for personal  safety.  Patient reports the following current or recent suicidal ideation or behaviors: passive ideation - feels overwhelmed with health issues and medical appts, no plan, no intent.  Patient denies current or recent homicidal ideation or behaviors.  Patient denies current or recent self injurious behavior or ideation.  Patient denies other safety concerns.  Recommended that patient call 911 or go to the local ED should there be a change in any of these risk factors.  Patient reports there are no firearms in the house.     Diagnostic Criteria:  Major Depressive Disorder  CRITERIA (A-C) REPRESENT A MAJOR DEPRESSIVE EPISODE - SELECT THESE CRITERIA  A) Recurrent episode(s) - symptoms have been present during the same 2-week period and represent a change from previous functioning 5 or more symptoms (required for diagnosis)   - Depressed mood. Note: In children and adolescents, can be irritable mood.     - Diminished interest or pleasure in all, or almost all, activities.    - Fatigue or loss of energy.    - Feelings of worthlessness or excessive guilt.    - Diminished ability to think or concentrate, or indecisiveness.   B) The symptoms cause clinically significant distress or impairment in social, occupational, or other important areas of functioning  C) The episode is not attributable to the physiological effects of a substance or to another medical condition  D) The occurence of major depressive episode is not better explained by other thought / psychotic disorders  E) There has never been a manic episode or hypomanic episode    DSM5 Diagnoses: (Sustained by DSM5 Criteria Listed Above)  Diagnoses: 296.33 (F33.2) Major Depressive Disorder, Recurrent Episode, Severe _  Psychosocial & Contextual Factors: multiple physical health problems      Intervention:   Solution Focused Brief Therapy   Brief Psychotherapy - discussed and prioritizing the most efficient treatment.    Collateral Reports Completed:  TC Collateral:  [facility] electronic medical records reviewed.    DATA:  Interactive Complexity: No  Crisis: No    PLAN: (Homework, other):  Provider will continue Diagnostic Assessment. Initial Treatment will focus on: Depressed Mood -   .  2.   Provider recommended the following referrals: none.    3.   Information in this assessment was obtained from the medical record and provided by patient who is a good historian.   4.   Follow up scheduled:  2 weeks w/ this clinician        Patient was given the following to do until next session:  focus on being intentional, mindful, connect w/ PCP about feeling overwhelmed w/ multiple medical appts   5.  Anticipated Discharge: Anticipated Discharge Time: < 1 Month   6. Is this the patient's last discharge: No      Procedure Code:  Psychotherapy (with patient) - 60 [CPT 33944]    Magui Ribera Psy.D, CLINT    Suicide Risk and Safety Concerns were assessed for. Patient meets the following risk assessment and triage: Patient has no change in safety concerns. Committed to safety and agreed to follow previously developed safety plan.

## 2024-10-02 NOTE — TELEPHONE ENCOUNTER
Approval for [facility] has been received from [facility]. Writer sent a copy to scanning and a copy was held with writer until scanning complete.

## 2024-10-04 ENCOUNTER — PATIENT OUTREACH (OUTPATIENT)
Dept: ONCOLOGY | Facility: CLINIC | Age: 69
End: 2024-10-04
Payer: MEDICARE

## 2024-10-04 NOTE — TELEPHONE ENCOUNTER
[facility]: Hematology                                                                                            LVM for patient to call back regarding scheduling a Therapeutic Phlebotomy per Dr. Pablo's check out orders       Crissy Musa LPN  Hematology Care Coordination  [phone]

## 2024-10-08 ENCOUNTER — TELEPHONE (OUTPATIENT)
Dept: CARDIOLOGY | Facility: CLINIC | Age: 69
End: 2024-10-08

## 2024-10-08 ENCOUNTER — OFFICE VISIT (OUTPATIENT)
Dept: PSYCHIATRY | Facility: CLINIC | Age: 69
End: 2024-10-08
Payer: MEDICARE

## 2024-10-08 DIAGNOSIS — F41.1 GENERALIZED ANXIETY DISORDER: ICD-10-CM

## 2024-10-08 DIAGNOSIS — F33.2 SEVERE EPISODE OF RECURRENT MAJOR DEPRESSIVE DISORDER, WITHOUT PSYCHOTIC FEATURES (H): Primary | ICD-10-CM

## 2024-10-08 ASSESSMENT — ANXIETY QUESTIONNAIRES
3. WORRYING TOO MUCH ABOUT DIFFERENT THINGS: SEVERAL DAYS
IF YOU CHECKED OFF ANY PROBLEMS ON THIS QUESTIONNAIRE, HOW DIFFICULT HAVE THESE PROBLEMS MADE IT FOR YOU TO DO YOUR WORK, TAKE CARE OF THINGS AT HOME, OR GET ALONG WITH OTHER PEOPLE: VERY DIFFICULT
6. BECOMING EASILY ANNOYED OR IRRITABLE: MORE THAN HALF THE DAYS
1. FEELING NERVOUS, ANXIOUS, OR ON EDGE: MORE THAN HALF THE DAYS
2. NOT BEING ABLE TO STOP OR CONTROL WORRYING: SEVERAL DAYS
7. FEELING AFRAID AS IF SOMETHING AWFUL MIGHT HAPPEN: SEVERAL DAYS
8. IF YOU CHECKED OFF ANY PROBLEMS, HOW DIFFICULT HAVE THESE MADE IT FOR YOU TO DO YOUR WORK, TAKE CARE OF THINGS AT HOME, OR GET ALONG WITH OTHER PEOPLE?: VERY DIFFICULT
7. FEELING AFRAID AS IF SOMETHING AWFUL MIGHT HAPPEN: SEVERAL DAYS
GAD7 TOTAL SCORE: 9
4. TROUBLE RELAXING: SEVERAL DAYS
5. BEING SO RESTLESS THAT IT IS HARD TO SIT STILL: SEVERAL DAYS
GAD7 TOTAL SCORE: 9
GAD7 TOTAL SCORE: 9

## 2024-10-08 ASSESSMENT — PATIENT HEALTH QUESTIONNAIRE - PHQ9
SUM OF ALL RESPONSES TO PHQ QUESTIONS 1-9: 11
10. IF YOU CHECKED OFF ANY PROBLEMS, HOW DIFFICULT HAVE THESE PROBLEMS MADE IT FOR YOU TO DO YOUR WORK, TAKE CARE OF THINGS AT HOME, OR GET ALONG WITH OTHER PEOPLE: VERY DIFFICULT
SUM OF ALL RESPONSES TO PHQ QUESTIONS 1-9: 11

## 2024-10-08 NOTE — TELEPHONE ENCOUNTER
LVM for pt who had some questions about scheduling a visit.    Miki Richardson RN on 10/8/2024 at 10:39 AM    Again today.    Miki Richardson RN on 10/9/2024 at 10:13 AM

## 2024-10-08 NOTE — PROGRESS NOTES
Interventional Psychiatry Program   Treatment Resistant Depression (TRD) Group  [facility], [facility]        Patient: Randi Cleary (1955)    MRN: [id number]  Date of Treatment: October 10, 2024    Duration of Treatment: Start Time: 10:30 am; End Time: 12:00  Providers/Group Facilitators: Daniella Gaona, PhD, LP; Valdo Harper MA (PhD student)     Number of Participants: 6  Group Format: In Person     People present:   Providers: Daniella Gaona and Valdo Harper  Patient: Randi Cleary  Others: Other patients    Encounter Diagnoses   Name Primary?    Severe episode of recurrent major depressive disorder, without psychotic features (H) Yes    Generalized anxiety disorder        Assessment (current symptoms):       9/26/2024    11:52 AM 10/1/2024    11:58 AM 10/8/2024     6:24 AM   PHQ   PHQ-9 Total Score 15 14 11   Q9: Thoughts of better off dead/self-harm past 2 weeks Several days Not at all Not at all   F/U: Thoughts of suicide or self-harm Yes     F/U: Self harm-plan No     F/U: Self-harm action No     F/U: Safety concerns No           9/9/2024     1:33 PM 9/24/2024     9:54 AM 10/8/2024     6:29 AM   CHAVO-7 SCORE   Total Score 13 (moderate anxiety) 9 (mild anxiety) 9 (mild anxiety)   Total Score 13 9 9    9     The treatment resistant depression (TRD) group is based on the cognitive behavioral therapy model that uses psychoeducation, behavioral activation, mindfulness, supportive techniques, problem solving techniques, and social skills.       Inclusion criteria for group participation: Current patient in the [facility] TRD program; agreement to group format and guidelines discussed in first session     Session 5 Content:  Collect BADS  Mindfulness exercise (5-10 mins)  debrief  Check-in / review of behavioral commitments from last week (20-30 min):   High/Low of week  Between-session activities check-in  Break (5 min)  Psychoed (15-20 min):  "Problem-solving obstacles  Wrap-up (10 min)  Questions/concerns etc  Any new behavioral commitments for next week (from predicting pleasure worksheet)      Description: Winnie shared she had a closed head injury from a fall.     She said she will start to feel better and then something comes along and throws everything off.    Patient's reaction to treatment strategies: positively engaged in group and helpful to others.    In response to the mindfulness exercise that focused on the change in seasons, Winnie also shared that she does not like change, but sometimes finds herself going through massive change. It seems like a bad time for her is during the winter months. She does not like that it can isolate her and it is dark outside. She wants to be mindful that she does not \"have a repeat of last winter\" this year. She is worried about being isolated while recovering from surgery in the winter.      Patient's progress toward treatment goals: Pt expressed commitment to working in group therapy.    Planned activity: Did not have time for activity planning in this session.    Mental Status Exam:  Alertness: alert and oriented  Appearance: well groomed  Behavior/Demeanor: cooperative and pleasant, with good eye contact   Speech: normal  Language: good. Preferred language identified as English.  Psychomotor: normal or unremarkable  Mood: depressed  Affect: restricted; was congruent to mood; was congruent to content  Thought Process/Associations: unremarkable; shared inclusive story  Perception: Reports intact    Insight: appears appropriate  Judgment: appears appropriate  Cognition: does appear grossly intact      Outpatient Treatment Plan     Today's Date: 10/10/24    Date of 1st group meetin/10/24       Diagnosis:  Encounter Diagnoses   Name Primary?    Severe episode of recurrent major depressive disorder, without psychotic features (H) Yes    Generalized anxiety disorder        Current symptoms and circumstances that " substantiate the diagnosis:  Persistent dysphoria, anhedonia, sleep disturbance, low energy/fatigue, trouble concentrating, and interfering anxiety and irritability.     How symptoms and/or behaviors are affecting level of function:  Reduced social engagement/social isolation; minimal physical activity    Risk Assessment:  Suicide:  Assessed Level of Immediate Risk: Low  Ideation: endorsed thoughts of suicide or self-harm  Denies self-harm action  Plan:  denies plan for suicide and self-harm  Safety: denied safety concerns     Homicide/Violence:  Assessed Level of Immediate Risk: Low  Ideation: Denies  Plan: n/a  Means: No  Intent: No          SYMPTOMS; PROBLEMS   MEASURABLE GOALS;    FUNCTIONAL IMPROVEMENT INTERVENTIONS Gains Made:     Depression reduce depressive symptoms, reduce depressive episodes, and report feeling more positive about self   Increase engagement with value-driven activities BA group therpay Pt has been actively engaged in group and used excellent social skills to connect with other group members     Frequency of Sessions: Weekly    Discharge and Aftercare Goals: see measurable goals above  Expected duration of treatment: 8 weeks  Participants in therapy plan (family, friends, support network): self     Patient verbally consented to the treatment plan above.    Valdo Harper MA  Practicum Therapist

## 2024-10-08 NOTE — TELEPHONE ENCOUNTER
Patient Contacted for the patient to call back and schedule the following:    Appointment type: RETURN PULMONARY HYPERTENSION  Provider: SOPHIA  Return date: NEXT AVAILABLE  Specialty phone number: [phone] OPT 1  Additional appointment(s) needed: N/A  Additonal Notes: PLEASE SCHEDULE NEXT AVAILABLE APPT IN THE [facility] HOLD SLOT ON 10/30/2024 AT 8:30AM

## 2024-10-09 ENCOUNTER — OFFICE VISIT (OUTPATIENT)
Dept: INTERNAL MEDICINE | Facility: CLINIC | Age: 69
End: 2024-10-09
Payer: MEDICARE

## 2024-10-09 VITALS
SYSTOLIC BLOOD PRESSURE: 101 MMHG | HEART RATE: 95 BPM | BODY MASS INDEX: 27.18 KG/M2 | HEIGHT: 66 IN | DIASTOLIC BLOOD PRESSURE: 67 MMHG | RESPIRATION RATE: 18 BRPM | WEIGHT: 169.1 LBS | OXYGEN SATURATION: 93 %

## 2024-10-09 DIAGNOSIS — I27.20 PULMONARY HYPERTENSION (H): ICD-10-CM

## 2024-10-09 DIAGNOSIS — F41.8 DEPRESSION WITH ANXIETY: ICD-10-CM

## 2024-10-09 DIAGNOSIS — Z12.11 SCREEN FOR COLON CANCER: Primary | ICD-10-CM

## 2024-10-09 DIAGNOSIS — F11.20 OPIOID TYPE DEPENDENCE, CONTINUOUS (H): ICD-10-CM

## 2024-10-09 DIAGNOSIS — F10.21 ALCOHOL USE DISORDER, MODERATE, IN SUSTAINED REMISSION (H): ICD-10-CM

## 2024-10-09 DIAGNOSIS — M62.81 GENERALIZED MUSCLE WEAKNESS: ICD-10-CM

## 2024-10-09 DIAGNOSIS — R07.89 CHEST WALL PAIN: ICD-10-CM

## 2024-10-09 PROCEDURE — G0008 ADMIN INFLUENZA VIRUS VAC: HCPCS | Performed by: NURSE PRACTITIONER

## 2024-10-09 PROCEDURE — 99214 OFFICE O/P EST MOD 30 MIN: CPT | Mod: 25 | Performed by: NURSE PRACTITIONER

## 2024-10-09 PROCEDURE — 90662 IIV NO PRSV INCREASED AG IM: CPT | Performed by: NURSE PRACTITIONER

## 2024-10-09 PROCEDURE — 91320 SARSCV2 VAC 30MCG TRS-SUC IM: CPT | Performed by: NURSE PRACTITIONER

## 2024-10-09 PROCEDURE — 90480 ADMN SARSCOV2 VAC 1/ONLY CMP: CPT | Performed by: NURSE PRACTITIONER

## 2024-10-09 NOTE — PROGRESS NOTES
Assessment & Plan     Screen for colon cancer  She has not a colonoscopy coming up later in November    Chest wall pain  She slipped backwards on the stairs and fell.  She has some pain along the lateral left chest wall area.  Advised topical lidocaine patch and heat    Essential hypertension  She does not appear to be on any antihypertensive medications.  We will remove this from her problem list     Pulmonary hypertension (H)  Recent right heart cath procedure, normal right-sided filling pressures, normal PA pressures    Depression with anxiety  Her mood is managed by psychiatry.  I reviewed the last psychiatry note, there were some discussions on adding ketamine but she continues with ECT/TMS, consideration for VNS.    Alcohol use disorder, moderate, in sustained remission (H)  Remains abstinent from alcohol.    Opioid type dependence, continuous (H)  Chronic pain patient, continues on oxycodone    Generalized muscle weakness  With her recent fall, generalized weakness and instability I think she would make a good PT candidate.  She would like to pursue physical therapy at the [facility] in [place]  - Physical Therapy  Referral; Future    Patient Instructions   COVID shot and flu shot today.    Lets refer you back to the [facility] for physical therapy given your recent fall.  We need to work on improving your muscle strengthening and balance/coordination.    I agree with holding off on the phlebotomy for now.    Use topical lidocaine patch on area of your chest that is uncomfortable.  Also use heat for 15 minutes a few times per day with gentle stretching        Subjective   Winnie is a 69 year old, presenting for the following health issues:\    Hospital F/U (Fell down the stairs on 9.25 and then rib pain 9.29 ) and Breast Pain (-left- after falling-had breast cancer 30 years ago)      10/9/2024    10:01 AM   Additional Questions   Roomed by joceline BRADLEY     Here for ED follow  "up      Objective    /67   Pulse 95   Resp 18   Ht 1.676 m (5' 6\")   Wt 76.7 kg (169 lb 1.6 oz)   LMP  (LMP Unknown)   SpO2 93%   BMI 27.29 kg/m    Body mass index is 27.29 kg/m .  Physical Exam   Tenderness with palpation to left chest wall        Signed Electronically by: Kaushik Hobbs, SOLO    "

## 2024-10-09 NOTE — PATIENT INSTRUCTIONS
COVID shot and flu shot today.    Lets refer you back to the [facility] for physical therapy given your recent fall.  We need to work on improving your muscle strengthening and balance/coordination.    I agree with holding off on the phlebotomy for now.    Use topical lidocaine patch on area of your chest that is uncomfortable.  Also use heat for 15 minutes a few times per day with gentle stretching

## 2024-10-10 ASSESSMENT — PATIENT HEALTH QUESTIONNAIRE - PHQ9
SUM OF ALL RESPONSES TO PHQ QUESTIONS 1-9: 10
SUM OF ALL RESPONSES TO PHQ QUESTIONS 1-9: 16
SUM OF ALL RESPONSES TO PHQ QUESTIONS 1-9: 16
SUM OF ALL RESPONSES TO PHQ QUESTIONS 1-9: 15
SUM OF ALL RESPONSES TO PHQ QUESTIONS 1-9: 21
SUM OF ALL RESPONSES TO PHQ QUESTIONS 1-9: 18
SUM OF ALL RESPONSES TO PHQ QUESTIONS 1-9: 11

## 2024-10-11 ENCOUNTER — NURSE TRIAGE (OUTPATIENT)
Dept: INTERNAL MEDICINE | Facility: CLINIC | Age: 69
End: 2024-10-11
Payer: MEDICARE

## 2024-10-11 ENCOUNTER — VIRTUAL VISIT (OUTPATIENT)
Dept: BEHAVIORAL HEALTH | Facility: CLINIC | Age: 69
End: 2024-10-11
Payer: MEDICARE

## 2024-10-11 ENCOUNTER — TELEPHONE (OUTPATIENT)
Dept: CARDIOLOGY | Facility: CLINIC | Age: 69
End: 2024-10-11
Payer: MEDICARE

## 2024-10-11 ENCOUNTER — ANESTHESIA EVENT (OUTPATIENT)
Dept: BEHAVIORAL HEALTH | Facility: CLINIC | Age: 69
End: 2024-10-11
Payer: MEDICARE

## 2024-10-11 ENCOUNTER — HOSPITAL ENCOUNTER (OUTPATIENT)
Dept: BEHAVIORAL HEALTH | Facility: CLINIC | Age: 69
Discharge: HOME | End: 2024-10-11
Attending: STUDENT IN AN ORGANIZED HEALTH CARE EDUCATION/TRAINING PROGRAM
Payer: MEDICARE

## 2024-10-11 ENCOUNTER — ANESTHESIA (OUTPATIENT)
Dept: BEHAVIORAL HEALTH | Facility: CLINIC | Age: 69
End: 2024-10-11
Payer: MEDICARE

## 2024-10-11 VITALS
TEMPERATURE: 97.1 F | DIASTOLIC BLOOD PRESSURE: 81 MMHG | RESPIRATION RATE: 18 BRPM | SYSTOLIC BLOOD PRESSURE: 101 MMHG | OXYGEN SATURATION: 94 % | HEART RATE: 79 BPM

## 2024-10-11 DIAGNOSIS — F33.2 SEVERE RECURRENT MAJOR DEPRESSION WITHOUT PSYCHOTIC FEATURES (H): Primary | ICD-10-CM

## 2024-10-11 DIAGNOSIS — F41.1 GENERALIZED ANXIETY DISORDER: ICD-10-CM

## 2024-10-11 PROCEDURE — 250N000009 HC RX 250

## 2024-10-11 PROCEDURE — 90870 ELECTROCONVULSIVE THERAPY: CPT

## 2024-10-11 PROCEDURE — 90870 ELECTROCONVULSIVE THERAPY: CPT | Performed by: STUDENT IN AN ORGANIZED HEALTH CARE EDUCATION/TRAINING PROGRAM

## 2024-10-11 PROCEDURE — 99207 PR NO CHARGE LOS: CPT | Mod: 95 | Performed by: PSYCHOLOGIST

## 2024-10-11 PROCEDURE — 250N000011 HC RX IP 250 OP 636: Performed by: PSYCHIATRY & NEUROLOGY

## 2024-10-11 PROCEDURE — 370N000017 HC ANESTHESIA TECHNICAL FEE, PER MIN

## 2024-10-11 PROCEDURE — 99100 ANES PT EXTEME AGE<1 YR&>70: CPT

## 2024-10-11 PROCEDURE — 250N000011 HC RX IP 250 OP 636

## 2024-10-11 RX ORDER — NICARDIPINE HCL-0.9% SOD CHLOR 1 MG/10 ML
SYRINGE (ML) INTRAVENOUS PRN
Status: DISCONTINUED | OUTPATIENT
Start: 2024-10-11 | End: 2024-10-11

## 2024-10-11 RX ORDER — FENTANYL CITRATE 50 UG/ML
25 INJECTION, SOLUTION INTRAMUSCULAR; INTRAVENOUS EVERY 5 MIN PRN
Status: DISCONTINUED | OUTPATIENT
Start: 2024-10-11 | End: 2024-10-12 | Stop reason: HOSPADM

## 2024-10-11 RX ORDER — OXYCODONE HYDROCHLORIDE 5 MG/1
5 TABLET ORAL
Status: DISCONTINUED | OUTPATIENT
Start: 2024-10-11 | End: 2024-10-12 | Stop reason: HOSPADM

## 2024-10-11 RX ORDER — FENTANYL CITRATE 50 UG/ML
50 INJECTION, SOLUTION INTRAMUSCULAR; INTRAVENOUS EVERY 5 MIN PRN
Status: DISCONTINUED | OUTPATIENT
Start: 2024-10-11 | End: 2024-10-12 | Stop reason: HOSPADM

## 2024-10-11 RX ORDER — SODIUM CHLORIDE, SODIUM LACTATE, POTASSIUM CHLORIDE, CALCIUM CHLORIDE 600; 310; 30; 20 MG/100ML; MG/100ML; MG/100ML; MG/100ML
INJECTION, SOLUTION INTRAVENOUS ONCE
Status: DISCONTINUED | OUTPATIENT
Start: 2024-10-11 | End: 2024-10-12 | Stop reason: HOSPADM

## 2024-10-11 RX ORDER — ONDANSETRON 2 MG/ML
4 INJECTION INTRAMUSCULAR; INTRAVENOUS EVERY 30 MIN PRN
Status: DISCONTINUED | OUTPATIENT
Start: 2024-10-11 | End: 2024-10-12 | Stop reason: HOSPADM

## 2024-10-11 RX ORDER — KETOROLAC TROMETHAMINE 30 MG/ML
30 INJECTION, SOLUTION INTRAMUSCULAR; INTRAVENOUS ONCE
Status: CANCELLED
Start: 2024-10-11 | End: 2024-10-11

## 2024-10-11 RX ORDER — NALOXONE HYDROCHLORIDE 0.4 MG/ML
0.1 INJECTION, SOLUTION INTRAMUSCULAR; INTRAVENOUS; SUBCUTANEOUS
Status: DISCONTINUED | OUTPATIENT
Start: 2024-10-11 | End: 2024-10-12 | Stop reason: HOSPADM

## 2024-10-11 RX ORDER — OXYCODONE HYDROCHLORIDE 5 MG/1
10 TABLET ORAL
Status: DISCONTINUED | OUTPATIENT
Start: 2024-10-11 | End: 2024-10-12 | Stop reason: HOSPADM

## 2024-10-11 RX ORDER — KETOROLAC TROMETHAMINE 30 MG/ML
30 INJECTION, SOLUTION INTRAMUSCULAR; INTRAVENOUS ONCE
Status: COMPLETED | OUTPATIENT
Start: 2024-10-11 | End: 2024-10-11

## 2024-10-11 RX ORDER — HYDROMORPHONE HCL IN WATER/PF 6 MG/30 ML
0.2 PATIENT CONTROLLED ANALGESIA SYRINGE INTRAVENOUS EVERY 5 MIN PRN
Status: DISCONTINUED | OUTPATIENT
Start: 2024-10-11 | End: 2024-10-12 | Stop reason: HOSPADM

## 2024-10-11 RX ORDER — ONDANSETRON 4 MG/1
4 TABLET, ORALLY DISINTEGRATING ORAL EVERY 30 MIN PRN
Status: DISCONTINUED | OUTPATIENT
Start: 2024-10-11 | End: 2024-10-12 | Stop reason: HOSPADM

## 2024-10-11 RX ORDER — DEXAMETHASONE SODIUM PHOSPHATE 4 MG/ML
4 INJECTION, SOLUTION INTRA-ARTICULAR; INTRALESIONAL; INTRAMUSCULAR; INTRAVENOUS; SOFT TISSUE
Status: DISCONTINUED | OUTPATIENT
Start: 2024-10-11 | End: 2024-10-12 | Stop reason: HOSPADM

## 2024-10-11 RX ORDER — HYDROMORPHONE HCL IN WATER/PF 6 MG/30 ML
0.4 PATIENT CONTROLLED ANALGESIA SYRINGE INTRAVENOUS EVERY 5 MIN PRN
Status: DISCONTINUED | OUTPATIENT
Start: 2024-10-11 | End: 2024-10-12 | Stop reason: HOSPADM

## 2024-10-11 RX ORDER — METHOHEXITAL IN WATER/PF 100MG/10ML
SYRINGE (ML) INTRAVENOUS PRN
Status: DISCONTINUED | OUTPATIENT
Start: 2024-10-11 | End: 2024-10-11

## 2024-10-11 RX ORDER — LABETALOL HYDROCHLORIDE 5 MG/ML
INJECTION, SOLUTION INTRAVENOUS PRN
Status: DISCONTINUED | OUTPATIENT
Start: 2024-10-11 | End: 2024-10-11

## 2024-10-11 RX ADMIN — Medication 100 MG: at 10:55

## 2024-10-11 RX ADMIN — LABETALOL HYDROCHLORIDE 20 MG: 5 INJECTION, SOLUTION INTRAVENOUS at 10:55

## 2024-10-11 RX ADMIN — NICARDIPINE HYDROCHLORIDE 500 MCG: 25 INJECTION INTRAVENOUS at 10:56

## 2024-10-11 RX ADMIN — KETOROLAC TROMETHAMINE 30 MG: 30 INJECTION, SOLUTION INTRAMUSCULAR at 10:43

## 2024-10-11 RX ADMIN — NICARDIPINE HYDROCHLORIDE 500 MCG: 25 INJECTION INTRAVENOUS at 10:55

## 2024-10-11 RX ADMIN — SUCCINYLCHOLINE CHLORIDE 90 MG: 20 INJECTION, SOLUTION INTRAMUSCULAR; INTRAVENOUS; PARENTERAL at 10:53

## 2024-10-11 ASSESSMENT — COPD QUESTIONNAIRES: COPD: 1
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
## 2024-10-11 NOTE — TELEPHONE ENCOUNTER
Nurse Triage SBAR    Is this a 2nd Level Triage? NO    Situation: Swelling, pain and redness to covid/ flu immunization site    Background: 68 yo female. Patient has never had a reaction to immunizations before.     Assessment:   Received Covid and Flu shots on Wednesday 10/9/24  Redness swelling, warmth and pain 5 out of 10   Symptoms started 10/10/24  No fever.  Never has had a reaction to an immunization  Had an ECT appointment today and received Toradol    Protocol Recommended Disposition:   Home Care    Recommendation:   Reviewed Home care advice with patient.  Patient is thankful and agreeable with plan.   Patient instructed to call back or be seen in urgent care if symptoms worsen or new symptoms arise.   No further questions at this time.      Reason for Disposition   COVID-19 vaccine, injection site reaction (e.g., pain, redness, swelling), questions about    Additional Information   Negative: Difficulty breathing or swallowing starts within 2 hours after injection   Negative: Difficult to awaken or acting confused (e.g., disoriented, slurred speech)   Negative: Unresponsive, passed out, or very weak   Negative: Sounds like a life-threatening emergency to the triager   Negative: Difficulty breathing or swallowing and starts within 2 hours after injection   Negative: Sounds like a life-threatening emergency to the triager   Negative: Symptoms of COVID-19 (e.g., cough, fever, SOB, or others) and within 14 days of COVID-19 EXPOSURE   Negative: Typical COVID-19 symptoms (e.g., cough, difficulty breathing, loss of taste or smell, runny nose, sore throat) that are NOT expected from vaccine   Negative: COVID-19 EXPOSURE and no symptoms, or symptoms not typical of COVID-19   Negative: Fever > 104 F (40 C)   Negative: Sounds like a severe, unusual reaction to the triager   Negative: Fever > 101 F (38.3 C) started > 48 hours after getting vaccine and over 60 years of age   Negative: Fever > 100.0 F (37.8 C) started >  48 hours after getting vaccine and bedridden (e.g., CVA, chronic illness, recovering from surgery)   Negative: Fever > 100.0 F (37.8 C) started > 48 hours after getting vaccine and diabetes mellitus or weak immune system (e.g., HIV positive, cancer chemo, splenectomy, organ transplant, chronic steroids)   Negative: Fever > 100.0 F (37.8 C) present > 3 days (72 hours)   Negative: Fever > 100.0 F (37.8 C) and healthcare worker   Negative: Pain, tenderness, or swelling at the injection site and over 3 days (72 hours) since vaccine and getting worse   Negative: Redness around the injection site and started > 48 hours after getting vaccine  (Exception: Red area < 1 inch or 2.5 cm wide.)   Negative: Pain, tenderness, or swelling at the injection site and lasts > 7 days   Negative: Lymph node swelling (i.e., armpit or neck on side of vaccine) and lasts > 3 weeks   Negative: Requesting COVID-19 vaccine    Protocols used: Immunization [id number], Coronavirus (COVID-19) Vaccine Questions and Opeuchelj-F-LU

## 2024-10-11 NOTE — ANESTHESIA CARE TRANSFER NOTE
Patient: Randi Cleary    Procedure: * No procedures listed *  Electroconvulsive Therapy    Diagnosis: * No pre-op diagnosis entered *  Diagnosis Additional Information: No value filed.    Anesthesia Type:   No value filed.     Note:    Oropharynx: oropharynx clear of all foreign objects  Level of Consciousness: awake  Oxygen Supplementation: room air    Independent Airway: airway patency satisfactory and stable  Dentition: dentition unchanged  Vital Signs Stable: post-procedure vital signs reviewed and stable  Report to RN Given: handoff report given  Patient transferred to: PACU    Handoff Report: Identifed the Patient, Identified the Reponsible Provider, Reviewed the pertinent medical history, Reviewed Intra-OP anesthesia mangement and issues during anesthesia, Allowed opportunity for questions and acknowledgement of understanding and Set expectations for post-procedure period  post-procedure handoff checklist not completed for medical reasons  Vitals:  Vitals Value Taken Time   /81 10/11/24 1125   Temp     Pulse 79 10/11/24 1125   Resp 18 10/11/24 1125   SpO2         Electronically Signed By: Camilo Stroud MD  October 11, 2024  11:29 AM

## 2024-10-11 NOTE — ANESTHESIA POSTPROCEDURE EVALUATION
Patient: Randi Cleary    Procedure: * No procedures listed *  Electroconvulsive Therapy    Anesthesia Type:  No value filed.    Note:  Disposition: Outpatient   Postop Pain Control: Uneventful            Sign Out: Well controlled pain   PONV: No   Neuro/Psych: Uneventful            Sign Out: Acceptable/Baseline neuro status   Airway/Respiratory: Uneventful            Sign Out: Acceptable/Baseline resp. status   CV/Hemodynamics: Uneventful            Sign Out: Acceptable CV status; No obvious hypovolemia; No obvious fluid overload   Other NRE: NONE   DID A NON-ROUTINE EVENT OCCUR? No       Last vitals:  Vitals:    10/11/24 1100 10/11/24 1110 10/11/24 1125   BP: 117/74 93/69 101/81   Pulse: 65 73 79   Resp: 16 20 18   Temp: 36.1  C (97  F)     SpO2: 97% 93%        Electronically Signed By: Camilo Stroud MD  October 11, 2024  11:29 AM

## 2024-10-11 NOTE — PROGRESS NOTES
Transition Clinic Progress Note    Patient Name:   Randi Cleary Date: October 11, 2024          Service Type: Individual        VISIT TIME START: 1506      VISIT TIME END: 1554    48 min   Session Length:  TC Session Length: 45 (38-52 Minutes)    Session #:  2    Attendees: TC Attendees: Client alone    Service Modality:  Service Modality: Video Visit:      Provider verified identity through the following two step process.  Patient provided:  Patient is known previously to provider    Telemedicine Visit: The patient's condition can be safely assessed and treated via synchronous audio and visual telemedicine encounter.      Reason for Telemedicine Visit: Patient convenience (e.g. access to timely appointments / distance to available provider)    Originating Site (Patient Location): Patient's home    Distant Site (Provider Location): Provider Remote Setting- Home Office    Consent:  The patient/guardian has verbally consented to: the potential risks and benefits of telemedicine (video visit) versus in person care; bill my insurance or make self-payment for services provided; and responsibility for payment of non-covered services.     Patient would like the video invitation sent by:  My Chart    Mode of Communication:  [facility]    Distant Location (Provider):  Off-site    As the provider I attest to compliance with applicable laws and regulations related to telemedicine.    Informed Consent and Assessment Methods    This provider and patient discussed HIPAA, and the limits of confidentiality; including mandated reporting, the possibility of collaborative discussions with patient's primary care provider and the multidisciplinary team in the MH clinic during consultation.  Discussed the no show policy, and the benefits and possible unintended consequences of therapy.  Patient indicated understanding Transition Clinic services are short term, solution focused, until a referral can be made and  patient can bridge to long term therapy.  Patient verbally indicated understanding the informed consent.        DATA  Interactive Complexity: No  Crisis: No        Progress Since Last Session (Related to Symptoms / Goals / Homework):   Symptoms: No change    Homework: Achieved / completed to satisfaction      Episode of Care Goals: Satisfactory progress - PREPARATION (Decided to change - considering how); Intervened by negotiating a change plan and determining options / strategies for behavior change, identifying triggers, exploring social supports, and working towards setting a date to begin behavior change     Current / Ongoing Stressors and Concerns:   Express irritability, feeling overwhelmed with medical appt.  States she doesn't feel like she has any time for herself.   Explored resources that might be available to assist.  Discussed scheduling time for art, time to relax as the same importance as medical appts she has.       Treatment Objective(s) Addressed in This Session:   Increase interest, engagement, and pleasure in doing things       Intervention:   Solution Focused Brief Therapy, Supportive Therapy   Brief Psychotherapy - discussed and prioritizing the most efficient treatment. and Person-Centered Therapy - Actualizes potential and moves towards increased awareness, spontaneity, trust in self and inner directedness.    Assessments completed prior to visit:  The following assessments were completed by patient for this visit:  [place] Suicide Severity Rating Scale (Short Version)      1/9/2023     1:00 PM 5/21/2024     4:49 PM 5/21/2024     9:00 PM 6/6/2024     8:00 AM 7/1/2024    12:00 PM 8/7/2024     3:00 PM 10/13/2024     4:00 PM   [place] Suicide Severity Rating (Short Version)   Q1 Wished to be Dead (Past Month) yes 1-->yes 1-->yes 1-->yes      Q2 Suicidal Thoughts (Past Month) no 1-->yes 1-->yes 1-->yes      Q3 Suicidal Thought Method no 0-->no 0-->no 1-->yes      Q4 Suicidal Intent without  Specific Plan no 1-->yes 0-->no 0-->no      Q5 Suicide Intent with Specific Plan no 0-->no 0-->no 1-->yes      Q6 Suicide Behavior (Lifetime) yes 1-->yes 0-->no 1-->yes      If yes to Q6, within past 3 months? no 1-->yes 0-->no 0-->no      Level of Risk per Screen moderate risk high risk moderate risk high risk      1. Wish to be Dead (Since Last Contact)     Y Y Y   Wish to be Dead Description (Since Last Contact)      tired of living for medical appts overwhelmed with medical issues   2. Non-Specific Active Suicidal Thoughts (Since Last Contact)     Y N N   Actual Attempt (Since Last Contact)      N    Has subject engaged in non-suicidal self-injurious behavior? (Since Last Contact)      N    Interrupted Attempts (Since Last Contact)      N    Aborted or Self-Interrupted Attempt (Since Last Contact)      N    Preparatory Acts or Behavior (Since Last Contact)      N    Suicide (Since Last Contact)      N    Calculated C-SSRS Risk Score (Since Last Contact)     Low Risk Low Risk Low Risk      ASSESSMENT: Current Emotional / Mental Status (status of significant symptoms):   Risk status (Self / Other harm or suicidal ideation)   Patient denies current fears or concerns for personal safety.   Patient reports the following current or recent suicidal ideation or behaviors: reported passive SI, no plan or intent.   Patient denies current or recent homicidal ideation or behaviors.   Patient denies current or recent self injurious behavior or ideation.   Patient denies other safety concerns.   Patient reports there has been no change in risk factors since their last session.     Patient reports there has been no change in protective factors since their last session.     A safety and risk management plan has been developed including: Patient consented to co-developed safety plan on 6/4/24.  Safety and risk management plan was reviewed.   Patient agreed to use safety plan should any safety concerns arise.  A copy was made  available to the patient.     Appearance:   Appropriate    Eye Contact:   Good    Psychomotor Behavior: Normal    Attitude:   Cooperative    Orientation:   All   Speech    Rate / Production: Normal     Volume:  Normal    Mood:    Anxious  Depressed  Overwhelmed   Affect:    Appropriate    Thought Content:  Clear    Thought Form:  Coherent  Logical    Insight:    Fair      Medication Review:   No changes to current psychiatric medication(s)     Medication Compliance:   Yes     Changes in Health Issues:   None reported     Chemical Use Review:   Substance Use: Chemical use reviewed, no active concerns identified      Tobacco Use: Did not discuss    Diagnoses:   296.33 (F33.2) Major Depressive Disorder, Recurrent Episode, Severe _  300.02 (F41.1) Generalized Anxiety Disorder    Collateral Reports Completed:    Collateral: [facility] electronic medical records reviewed.    PLAN: (Patient Tasks / Therapist Tasks / Other)  Social work request form completed to assist with resources to help patient better manage her health  Patient will add herself to her schedule of appt - allow blocks of time for herself to do art, relax, ect  Scheduled appt w/ this writer next week, then the patient will return to her established long term provider    Is this the patient's last discharge?: No    Procedure Code: Psychotherapy (with patient) - 45 [CPT 45516]    Magui Ribera Psy.D, CLINT                       Treatment Plan     Patient's Name: Randi Cleary             YOB: 1955  Date of Creation: 10/11/24  Date Treatment Plan Last Reviewed/Revised:initial     DSM5 Diagnoses: 296.33 (F33.2) Major Depressive Disorder, Recurrent Episode, Severe _ or 300.02 (F41.1) Generalized Anxiety Disorder  Psychosocial / Contextual Factors: history of leola issues that impact quality of life.     PROMIS (reviewed every 90 days):      Referral / Collaboration:  Follow-up with medical team .     Anticipated number of  session for this episode of care: 3+ sessions as needed until bridged to [facility]  Anticipation frequency of session: Weekly to biweekly   Anticipated Duration of each session: 38-52 minutes  Treatment plan will be reviewed in 90 days or when goals have been changed.         MeasurableTreatment Goal(s) related to diagnosis / functional impairment(s)     Goal 1: Patient will increase awareness of depression symptoms and their impact on functioning and develop skills to reduce negative impact      I will know I've met my goal when I am able to better manage my mood.          Objective #A (Patient Action)     Patient will describe thoughts, feelings, and actions associated with depression.                 Status: New - Date:10/11/24     Intervention(s)     Therapist will explore and process  patient triggers for depression and how depression has   impacted them.        Objective #B     Patient will increase depression coping skills and reduce suicidal ideations.   Status: New - Date: 10/11/24       Intervention(s)     Therapist will teach mindfulness skills and model in session      I will know I've met my goal when I can manage and cope with depression and I no   longer experience suicidal ideations.      Objective #C  Bridge patient to [facility] for long term Therapy  Status: New - Date: 10/11/24     Intervention(s)?    Facilitate Bridging to [facility]  Review safety plan in session/patient to follow her safety plan     Goal?2:?Patient will?experience a decrease in anxiety symptoms, as measured by a?2 point?reduction in her CHAVO-7 score.?   I will?know I've met my goal when I feel less stress and less worry.      Objective #A?(Patient Action)??  Patient will?identify?3?fears / thoughts that contribute to feeling anxious.?     Status: New - Date: 10/11/24     Intervention(s)?     Therapist will?provide cognitive behavioral therapeutic approach in processing patient's thoughts, feelings and beliefs and toward assisting patient in  developing greater awareness of unhelpful thoughts that associate with increased?anxiety.??        Objective #B  Bridge patient to [facility] for long term Therapy   Status: New - Date: 10/11/24     Intervention(s)?    Facilitate Bridging to [facility]      Therapist and patient discussed the goals for therapy and the interventions in therapy session.   Magui Ribera Psy.D, LP    10/11/24     __________________________________________________________________________________________  [place] Safety Plan     Creation Date: 6/4/24        Step 1: Warning signs:  Warning Signs   Start going down rabbit hole, thinking about the past, negative and positive memories   Missing my father   Anger / rage   Not taking care of myself      Step 2: Internal coping strategies - Things I can do to take my mind off my problems without contacting another person:  Strategies   Spirituality   Listening to music   Cat - [place]   Listening to Sidney play music   Gardening   Swimming      Step 3: People and social settings that provide distraction:  Name Contact Information   Vania Patiño        Places   [facility] pool   Going for walks outside      Step 4: People whom I can ask for help during a crisis:  Name Contact Information   Vania Little        Step 5: Professionals or agencies I can contact during a crisis:  Clinician/Agency Name Phone Emergency Contact   [facility] Crisis Line       [facility] [facility]          Suicide Prevention Lifeline Phone: Call or Text 500  Crisis Text Line: Text HOME to 880759     Step 6: Making the environment safer (plan for lethal means safety):  Maybe have Sidney help with / hold onto medications.     Optional: What is most important to me and worth living for?:  A second chance. Writer a book. Painting. Gardening. Sidney and Clifford. Getting a therapy dog.     Reggie Safety Plan. Randi Poole and Robby Barba. Used with permission of the authors.                                                        Magui Ribera, Iveth, LP  October 11, 2024

## 2024-10-11 NOTE — DISCHARGE INSTRUCTIONS
ECT Discharge Instructions      During your ECT series:    Do not drive or work heavy equipment until 7 days after your last treatment.  Do not drink alcohol or use street drugs (illicit drugs) while you are having treatments.  Do not make important decisions, including legal decisions.    After each treatment:    Get plenty of rest. A responsible adult must stay with your for at least 6 hours.  Avoid heavy or risky activities for 24 hours while in maintenance ECT.   Do not drive for at least 24 hours after your treatment while in maintenance ECT.   If you have more than one treatment within one week, do not drive for 7 days after your last treatment.   If you feel light-headed, sit for a few minutes before standing. Have someone help you get up.  If you have nausea (feel sick to your stomach): Drink only clear liquids such as apple juice, ginger ale, broth or 7UP, Be sure to drink plenty of liquids. Move to a normal diet as you feel able.   If you received Toradol, wait 6 hours before taking ibuprofen.  Call your doctor if:   You have a fever over 100F (37.7 C) (taken under the tongue), or a fever that last more than 24 hours.  Your IV site is red, swollen, very painful or is getting more tender.  You have nausea that gets very bad or does not improve.    If you have any symptoms after ECT, tell our staff before your next treatment.  The ECT Department can be reached at [phone].  The ECT Department is open Mondays, Wednesdays and Fridays from 7:00 AM to 2:00 PM.    To speak to a doctor, call:  Your primary care provider or [facility] for Dr. Beavers, Dr. Hutchison or Dr. Cotter PHONE: [phone]; fax: [phone]      New instructions:  Please call your PCP and let them know about the rash around your COVID shot, that your ECT team examined it and is wondering if it is cellulitis     Plan for maintenance every 2 weeks for now.    You have received Toradol today at 10:43 AM. Toradol is an NSAID.  Do  not take any NSAID's including: Ibuprofen, Naproxen Sodium, Asprin, Advil, Motrin, Aleve, Shannan, etc., until six hours after the above time which would be 4:43 PM. If you have any questions check back with your Physician, or pharmacist.     Covid-19 Testing:  We are no longer requiring covid tests prior to your procedure. If you are ill, please call the ECT department to discuss plan for rescheduling your appointment. [phone]    Please come to the [facility] and check-in with Security before your ECT appointment.  The [facility] is located right next to the Adult Emergency Room.  The address is [address] [place] [address].    After your appointment, you may be picked up at the [facility] entrance, which is located at [address] [place] [address].  [facility], about 1 block North of the [facility].    Transported by:   Sidney ()    Reviewed AVS discharge instructions with:   Sidney ()

## 2024-10-11 NOTE — TELEPHONE ENCOUNTER
Left Voicemail (1st Attempt) and Sent Mychart (1st Attempt) for the patient to call back and schedule the following:    Appointment type: RETURN HEART FAILURE  Provider: SOPHIA  Return date: NEXT AVAILABLE  Specialty phone number: [phone] OPT 1  Additional appointment(s) needed: LABS  Additonal Notes: PLEASE OFFER PT 12:00/12:30PM SLOT ON 11/27    
no

## 2024-10-13 ENCOUNTER — DOCUMENTATION ONLY (OUTPATIENT)
Dept: BEHAVIORAL HEALTH | Facility: CLINIC | Age: 69
End: 2024-10-13
Payer: MEDICARE

## 2024-10-13 ASSESSMENT — COLUMBIA-SUICIDE SEVERITY RATING SCALE - C-SSRS
2. HAVE YOU ACTUALLY HAD ANY THOUGHTS OF KILLING YOURSELF?: NO
1. SINCE LAST CONTACT, HAVE YOU WISHED YOU WERE DEAD OR WISHED YOU COULD GO TO SLEEP AND NOT WAKE UP?: YES

## 2024-10-14 ENCOUNTER — PRE VISIT (OUTPATIENT)
Dept: SURGERY | Facility: CLINIC | Age: 69
End: 2024-10-14

## 2024-10-14 ENCOUNTER — VIRTUAL VISIT (OUTPATIENT)
Dept: SURGERY | Facility: CLINIC | Age: 69
End: 2024-10-14
Payer: MEDICARE

## 2024-10-14 VITALS — BODY MASS INDEX: 27.16 KG/M2 | WEIGHT: 169 LBS | HEIGHT: 66 IN

## 2024-10-14 DIAGNOSIS — Z12.11 COLON CANCER SCREENING: ICD-10-CM

## 2024-10-14 DIAGNOSIS — Z01.818 PRE-OP EVALUATION: Primary | ICD-10-CM

## 2024-10-14 PROCEDURE — 99204 OFFICE O/P NEW MOD 45 MIN: CPT | Mod: 95 | Performed by: NURSE PRACTITIONER

## 2024-10-14 RX ORDER — ACETAMINOPHEN 325 MG/1
325-650 TABLET ORAL EVERY 6 HOURS PRN
COMMUNITY

## 2024-10-14 ASSESSMENT — LIFESTYLE VARIABLES: TOBACCO_USE: 1

## 2024-10-14 ASSESSMENT — ENCOUNTER SYMPTOMS: SEIZURES: 0

## 2024-10-14 ASSESSMENT — COPD QUESTIONNAIRES: COPD: 1

## 2024-10-14 NOTE — ADDENDUM NOTE
Addendum  created 10/13/24 1933 by Camilo Stroud MD    Attestation recorded in Intraprocedure, Intraprocedure Attestations filed

## 2024-10-14 NOTE — H&P
Pre-Operative H & P     CC:  Preoperative exam to assess for increased cardiopulmonary risk while undergoing surgery and anesthesia.    Date of Encounter: 10/14/2024  Primary Care Physician:  Kaushik Hobbs     Reason for visit:   Encounter Diagnoses   Name Primary?    Pre-op evaluation Yes    Colon cancer screening        HPI  Randi Cleary is a 69 year old female who presents for pre-operative H & P in preparation for  Procedure Information       Case: [id number] Date/Time: 11/05/24 0800    Procedure: Colonoscopy (Anus)    Anesthesia type: MAC    Diagnosis: Colon cancer screening [Z12.11]    Pre-op diagnosis: Colon cancer screening [Z12.11]    Location:  [facility] /  GI    Providers: Guru Elke Tolbert MD            The patient presents to the PAC virtually today in preparation for the above scheduled procedure with comorbid conditions including HTN, pulmonary hypertension, COPD, KYAW, hereditary hemochromatosis, h/o serotonin syndrome, chronic pain syndrome, opioid use, neuropathy, osteoarthritis, h/o breast cancer s/p surgery/chemo/radiation, pheochromocytoma s/p partial left adrenalectomy (2017), prediabetes, grave's dz s/p ablation, hypothyroid, Vit B12 deficiency, GERD, depression s/p ECT, borderline personality disorder, h/o alcohol use disorder and RLS.    The patient has been scheduled for the procedure as listed above for colon cancer screening.      History is obtained from the patient and chart review    Hx of abnormal bleeding or anti-platelet use: denies     Menstrual history: No LMP recorded (lmp unknown). Patient is postmenopausal.:      Past Medical History  Past Medical History:   Diagnosis Date    Bipolar 2 disorder (H)     Breast cancer (H) 1986    lumpectomy, radiation, chemo    Chronic pain syndrome     COPD (chronic obstructive pulmonary disease) (H)     asthma    Cord compression (H) 12/21/2021    Dizzy     Drug tolerance     opioid    Esophageal reflux      Fatigue     Generalized anxiety disorder     Graves disease 1994    Hemochromatosis 02/14/2018    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 Created by Conversion  Replacement Utility updated for latest IMO load Formatting of this note might be different from the original. Created by Conversion  Replacement Utility updated for latest IMO load    History of corticosteroid therapy 11/19/2019    History of partial adrenalectomy (H) 11/19/2019    History of pheochromocytoma 11/19/2019    Hx antineoplastic chemotherapy     Hx of radiation therapy     Hyperlipidaemia     Hypertension     Impaired fasting glucose 2017    Injury of neck, whiplash 07/15/2021    Joint pain     KYAW (obstructive sleep apnea) 2016    Osteopenia     Pheochromocytoma, left 08/02/2017    laparoscopically removed    Postablative hypothyroidism 1995    Prediabetes 10/03/2019    by A1c    Psoriasis     Psoriatic arthropathy (H)     Pulmonary hypertension (H)     Right rotator cuff tear     RLS (restless legs syndrome)     on ropinorole    Sacroiliitis (H)     Serotonin syndrome 08/28/2020    [facility] - While on desvenlafaxine 100mg    Snoring     Spinal stenosis     Status post coronary angiogram 10/03/2019    Urinary incontinence     Vitamin B 12 deficiency 2009    Vitamin D deficiency 2010       Past Surgical History  Past Surgical History:   Procedure Laterality Date    ARTHRODESIS ANKLE      ARTHROPLASTY ANKLE Right 6/29/2015    Procedure: ARTHROPLASTY ANKLE;  Surgeon: Jason Coughlin MD;  Location: [facility]    ARTHROPLASTY REVISION ANKLE Right 6/29/2015    Procedure: ARTHROPLASTY REVISION ANKLE;  Surgeon: Jason Coughlin MD;  Location:  [facility]    BIOPSY BREAST      BREAST BIOPSY, CORE RT/LT      COLONOSCOPY      COLONOSCOPY N/A 2/25/2021    Procedure: COLONOSCOPY;  Surgeon: Guru Elke Tolbert MD;  Location:  GI    CV  CORONARY ANGIOGRAM N/A 10/3/2019    Procedure: CV CORONARY ANGIOGRAM;  Surgeon: Bryce Pierre MD;  Location:  HEART CARDIAC CATH LAB    CV RIGHT HEART CATH MEASUREMENTS RECORDED N/A 10/3/2019    Procedure: CV RIGHT HEART CATH;  Surgeon: Bryce Pierre MD;  Location:  HEART CARDIAC CATH LAB    CV RIGHT HEART CATH MEASUREMENTS RECORDED N/A 9/25/2024    Procedure: Heart Cath Right Heart Cath;  Surgeon: George Becker MD;  Location:  HEART CARDIAC CATH LAB    ESOPHAGOSCOPY, GASTROSCOPY, DUODENOSCOPY (EGD), COMBINED N/A 2/25/2021    Procedure: ESOPHAGOGASTRODUODENOSCOPY, WITH BIOPSY;  Surgeon: Guru Elke Tolbert MD;  Location:  GI    EYE SURGERY  2021    HC REMOVE TONSILS/ADENOIDS,<11 Y/O      Description: Tonsillectomy With Adenoidectomy;  Recorded: 04/07/2010;    IR LUMBAR EPIDURAL STEROID INJECTION  10/26/2004    IR LUMBAR EPIDURAL STEROID INJECTION  11/16/2004    IR LUMBAR EPIDURAL STEROID INJECTION  12/21/2004    IR LUMBAR EPIDURAL STEROID INJECTION  6/8/2006    JOINT REPLACEMENT      LAMINOPLASTY CERVICAL POSTERIOR THREE+ LEVELS Left 12/21/2021    Procedure: CERVICAL 3-CERVICAL 6 LEFT OPEN DOOR LAMINOPLASTY AND LEFT CERVICAL 4-5 AND CERVICAL 6-7 POSTERIOR FORAMINOTOMY;  Surgeon: Angela Gregory MD;  Location: [facility]    LAPAROSCOPIC ADRENALECTOMY Left 08/02/2017    pheochromocytoma    LAPAROSCOPIC ADRENALECTOMY Left 8/2/2017    Procedure: LAPAROSCOPIC LEFT ADRENALECTOMY, ;  Surgeon: Gab Linares MD;  Location: [facility];  Service:     LENGTHEN TENDON ACHILLES Right 6/29/2015    Procedure: LENGTHEN TENDON ACHILLES;  Surgeon: Jason Coughlin MD;  Location: [facility]    LUMPECTOMY BREAST      LUMPECTOMY BREAST Left 1994    MAMMOPLASTY REDUCTION Right 01/13/2015    [place]    MAMMOPLASTY REDUCTION Right     approx late 2015/early2016    MASTECTOMY      left lumpectomy with axillary node dissection    MASTECTOMY MODIFIED RADICAL      OTHER SURGICAL  HISTORY Right     reconstructive breast surgery    OTHER SURGICAL HISTORY      Adrenalectomy for pheochromocytoma    [place] MASTECTOMY, MODIFIED RADICAL      Description: Modified Radical Mastectomy Left Breast;  Recorded: 04/07/2010;    REPAIR HAMMER TOE Right 6/29/2015    Procedure: REPAIR HAMMER TOE;  Surgeon: Jason Coughlin MD;  Location: [facility]    TONSILLECTOMY      TONSILLECTOMY & ADENOIDECTOMY      ZZC ARTHRODESIS,ANKLE,OPEN Right     Description: Ankle Arthrodesis;  Recorded: 04/07/2010;       Prior to Admission Medications  Current Outpatient Medications   Medication Sig Dispense Refill    acetaminophen (TYLENOL) 325 MG tablet Take 325-650 mg by mouth every 6 hours as needed for mild pain.      albuterol (VENTOLIN HFA) 108 (90 Base) MCG/ACT inhaler Inhale 2 puffs into the lungs every 6 hours as needed for shortness of breath, wheezing or cough 18 g 11    Cyanocobalamin (VITAMIN B-12) 5000 MCG SUBL Place 2-3 sprays under the tongue every morning. Unknown dose. 2 or 3 sprays/day      ethacrynic acid (EDECRIN) 25 MG tablet Half pill every day (Patient taking differently: Take 0.5 tablets by mouth every morning. Half pill every day) 45 tablet 3    Fluticasone-Umeclidin-Vilanterol (TRELEGY ELLIPTA) 200-62.5-25 MCG/ACT oral inhaler Inhale 1 puff into the lungs daily. (Patient taking differently: Inhale 1 puff into the lungs every morning.) 60 each 11    gabapentin (NEURONTIN) 100 MG capsule Take 1 capsule (100 mg) by mouth every 6 hours as needed (anxiety) (Patient taking differently: Take 300 mg by mouth every 6 hours as needed (anxiety).) 120 capsule 1    gabapentin (NEURONTIN) 400 MG capsule Take 1 capsule (400 mg) by mouth at bedtime 30 capsule 1    guaiFENesin (MUCINEX) 600 MG 12 hr tablet Take 600 mg by mouth every morning.      KLOR-CON M20 20 MEQ CR tablet Take 1 tablet (20 mEq) by mouth every morning. 90 tablet 3    MAGNESIUM PO Take 1 capsule by mouth 2 times daily Strength unknown      melatonin 3 MG  tablet Take 1 tablet (3 mg) by mouth nightly as needed for sleep. 30 tablet 1    omeprazole (PRILOSEC) 20 MG DR capsule Take 1 capsule (20 mg) by mouth every morning 90 capsule 3    oxyCODONE (ROXICODONE) 5 MG tablet Take 1 tablet (5 mg) by mouth 5 times daily. May dispense/start 9/30/24 (Patient taking differently: Take 5 mg by mouth every 6 hours. May dispense/start 9/30/24) 70 tablet 0    rOPINIRole (REQUIP) 2 MG tablet Take 1 tablet (2 mg) by mouth 3 times daily One tab 3 pm, one bedtime, and one during night on waking 270 tablet 3    SYNTHROID 150 MCG tablet Take 1 tablet (150 mcg) by mouth every morning MON to SAT 1 tablet/day; SUN 0.5 tablet      UNABLE TO FIND Take 1 tablet by mouth every morning. MEDICATION NAME: CETYL-MYRISFOLEATE      vitamin C (ASCORBIC ACID) 1000 MG TABS Take 1,000 mg by mouth every morning.      vitamin D3 (CHOLECALCIFEROL) 250 mcg (76181 units) capsule Take 1 capsule by mouth once a week. SUNDAY'S      allopurinol (ZYLOPRIM) 300 MG tablet Take 1 tablet (300 mg) by mouth every morning (Patient taking differently: Take 300 mg by mouth as needed.)      bisacodyl (DULCOLAX) 5 MG EC tablet Two days prior to exam take two (2) tablets at 4pm. One day prior to exam take two (2) tablets at 4pm 4 tablet 0    dextromethorphan-buPROPion ER (AUVELITY)  MG ER tablet Take 1 tablet by mouth daily. (Patient taking differently: Take 1 tablet by mouth daily. Have not started yet) 30 tablet 0    medical cannabis (Patient's own supply) See Admin Instructions (The purpose of this order is to document that the patient reports taking medical cannabis.  This is not a prescription, and is not used to certify that the patient has a qualifying medical condition.)  Flower      naloxone (NARCAN) 4 MG/0.1ML nasal spray Spray 1 spray (4 mg) into one nostril alternating nostrils as needed for opioid reversal every 2-3 minutes until assistance arrives 0.2 mL 0    polyethylene glycol (GOLYTELY) 236 g suspension  "Two days before procedure at 5PM fill first container with water. Mix and drink an 8 oz glass every 15 minutes until HALF of the container is gone. Place the remainder in the refrigerator. One day before procedure at 5PM drink second half of bowel prep. Drink an 8 oz glass every 15 minutes until it is gone. Day of procedure 6 hours before arrival time fill the 2nd container with water. Mix and drink an 8 oz glass every 15 minutes until HALF of the container is gone. Discard the remaining solution. (Patient not taking: Reported on 10/9/2024) 8000 mL 0    STATIN NOT PRESCRIBED (INTENTIONAL) Please choose reason not prescribed from choices below.      STATIN NOT PRESCRIBED (INTENTIONAL) Please choose reason not prescribed from choices below. (Patient not taking: Reported on 10/9/2024)         Allergies  Allergies   Allergen Reactions    Serotonin Reuptake Inhibitors (Ssris) Anxiety, Difficulty breathing, Headache, Palpitations and Shortness Of Breath    Buspirone      The patient states she had serotonin syndrome    Cephalexin      Other reaction(s): unknown rxn.    Desvenlafaxine      Serotonin syndrome    Diclofenac Sodium [Diclofenac]      Serotonin syndrome and restless legs syndrome    Gabapentin      Drove on the wrong side of the highway    Levofloxacin      \"CAN'T REMEMBER\"    Penicillins      \"SORES IN MOUTH\"    Riluzole Difficulty breathing and Swelling    Sulfa Antibiotics      \"PT DOES NOT KNOW WHAT THE REACTION WAS\"    Topiramate Other (See Comments)     Frequent urination       Social History  Social History     Socioeconomic History    Marital status:      Spouse name: Not on file    Number of children: Not on file    Years of education: Not on file    Highest education level: Not on file   Occupational History    Not on file   Tobacco Use    Smoking status: Former     Current packs/day: 0.00     Average packs/day: 2.5 packs/day for 29.2 years (72.9 ttl pk-yrs)     Types: Cigarettes     Start " date: 1971     Quit date: 2000     Years since quittin.3     Passive exposure: Current    Smokeless tobacco: Never   Vaping Use    Vaping status: Never Used   Substance and Sexual Activity    Alcohol use: Not Currently     Comment: relapse 2021 sober     Drug use: Yes     Types: Marijuana     Comment: smoking and edibles daily    Sexual activity: Not on file   Other Topics Concern    Parent/sibling w/ CABG, MI or angioplasty before 65F 55M? Yes   Social History Narrative    Not on file     Social Determinants of Health     Financial Resource Strain: High Risk (2023)    Financial Resource Strain     Within the past 12 months, have you or your family members you live with been unable to get utilities (heat, electricity) when it was really needed?: Yes   Food Insecurity: High Risk (2023)    Food Insecurity     Within the past 12 months, did you worry that your food would run out before you got money to buy more?: Yes     Within the past 12 months, did the food you bought just not last and you didn t have money to get more?: Yes   Transportation Needs: High Risk (2023)    Transportation Needs     Within the past 12 months, has lack of transportation kept you from medical appointments, getting your medicines, non-medical meetings or appointments, work, or from getting things that you need?: Yes   Physical Activity: Not on file   Stress: Not on file   Social Connections: Not on file   Interpersonal Safety: Low Risk  (2024)    Interpersonal Safety     Do you feel physically and emotionally safe where you currently live?: Yes     Within the past 12 months, have you been hit, slapped, kicked or otherwise physically hurt by someone?: No     Within the past 12 months, have you been humiliated or emotionally abused in other ways by your partner or ex-partner?: No   Housing Stability: High Risk (2023)    Housing Stability     Do you have housing? : Yes     Are you worried about  losing your housing?: Yes       Family History  Family History   Problem Relation Age of Onset    Obesity Mother     Thyroid Disease Mother     Arthritis Mother     Hypertension Mother     Osteoporosis Mother     Hemochromatosis Father     Myocardial Infarction Father     Snoring Father     Heart Disease Father     Obesity Father     Coronary Artery Disease Father          at age 53 of heart attack    Hypertension Father     Genetic Disorder Father         Hemachromatosis    Lupus Sister     Hypertension Sister     Obesity Sister     Scleroderma Sister     Heart Failure Sister     Hemochromatosis Sister     Obesity Sister     Cardiovascular Sister     Hypertension Sister     Arthritis Sister     Hemochromatosis Sister     Lupus Sister     Scleroderma Sister     Coronary Artery Disease Sister     Genetic Disorder Sister         Lupus, Hemachromatosis    Hemochromatosis Brother     Hypertension Brother     Alcoholism Brother     Substance Abuse Brother     Cardiovascular Brother     Hypertension Brother     Arthritis Brother     Hemochromatosis Brother     Prostate Cancer Brother     Genetic Disorder Brother         Hemachromatosis    Obesity Brother     Cardiovascular Maternal Grandmother     Coronary Artery Disease Maternal Grandmother     Osteoporosis Maternal Grandmother     Alcoholism Maternal Grandfather     Cerebrovascular Disease Paternal Grandmother     Mental Illness Maternal Uncle     Adrenal Disorder No family hx of     Breast Cancer No family hx of     Anesthesia Reaction No family hx of     Deep Vein Thrombosis (DVT) No family hx of        Review of Systems  The complete review of systems is negative other than noted in the HPI or here.   Anesthesia Evaluation   Pt has had prior anesthetic. Type: MAC and General.    History of anesthetic complications  - .  KYAW.    ROS/MED HX  ENT/Pulmonary:     (+) sleep apnea, doesn't use CPAP,              tobacco use, Past use,         COPD,           (-) recent  URI   Neurologic: Comment: RLS    (+)    peripheral neuropathy, - hands.                        (-) no seizures, no CVA and no TIA   Cardiovascular: Comment: Denies cardiac symptoms including chest pain, SOB, palpitations, syncope, ANNA, orthopnea, or PND.       (+)  hypertension- -   -  - -                                pulmonary hypertension, Previous cardiac testing   Echo: Date: 10/2023 Results:  Echocardiogram with two-dimensional, color and spectral Doppler performed.  ______________________________________________________________________________  Interpretation Summary  Global and regional left ventricular function is normal with an EF of 55-60%  with normal size and thickness.     Global right ventricular function and size are normal.     Trace to mild aortic insufficiency is present.     No pericardial effusion is present.     This study was compared with the study from 8/13/19 .  No significant changes noted.    Stress Test:  Date: 11/2021 Results:  Result Text        There is no prior study for comparison.     Pharmacological regadenoson stress ECG is negative for inducible myocardial ischemia.     Pharmacological regadenoson nuclear stress test is probably abnormal. There is a very small sized induced myocardial ischemia in distal anterolateral segment.  There is no previous myocardial infarction.     Normal left ventricular size, wall motion and systolic function.  The calculated left ventricular ejection fraction is 68%.     The patient is at a low risk of future cardiac ischemic events.       ECG Reviewed:  Date: 5/2024 Results:  Sinus rhythm   Right bundle branch block   Abnormal ECG   When compared with ECG of 21-MAY-2024 17:10,   No significant change was found   Confirmed by MD BLAYNE, JAYY ([id number]) on 5/24/2024 2:57:16 PM     Cath:  Date: 9/25/24 Results:    Conclusion          Right sided filling pressures are normal.     Left sided filling pressures are normal.     Normal PA pressures.      Normal cardiac output level.     Hemodynamics    RA 2/3/2  RV 20/2  PA 20/10/13  PCWP 8/9/7  PA% 70  Measured Jeannie CO/CI 5.56/2.3  Thermodilution CO/CI 4.3/2.3  PVR 1.08 QUIROZ Right sided filling pressures are normal. Left sided filling pressures are normal. Normal PA pressures. Normal cardiac output level.     (-) taking anticoagulants/antiplatelets   METS/Exercise Tolerance: 3 - Able to walk 1-2 blocks without stopping Comment: METS<4.   Hematologic: Comments: B12 deficiency    Hereditary hemochromatosis     Polycythemia   (-) history of blood clots, anemia and history of blood transfusion   Musculoskeletal: Comment: H/o gout  S/p cervical spine surgery 2021  (+)  arthritis,             GI/Hepatic:     (+) GERD, Asymptomatic on medication,           liver disease (fatty liver),       Renal/Genitourinary:  - neg Renal ROS  (-) renal disease   Endo: Comment: Pheochromocytoma s/p partial adrenalectomy    (+)          thyroid problem, hypothyroidism Graves, s/p ablation,    Obesity (Intentional weight loss of 60 pounds.),    (-) Type II DM   Psychiatric/Substance Use: Comment: Currently undergoing ECT    Borderline personality        (+) psychiatric history (ECT every other week.) bipolar and depression  H/O chronic opiod use .     Infectious Disease:  - neg infectious disease ROS     Malignancy:   (+) Malignancy, History of Breast.Breast CA Remission status post Surgery, Chemo and Radiation.      Other: Comment: Psoriasis   - neg other ROS    (+)  , H/O Chronic Pain,         Virtual visit -  No vitals were obtained    Physical Exam  Constitutional: Awake, alert, cooperative, no apparent distress, and appears stated age.  Eyes: Pupils equal  HENT: Normocephalic  Respiratory: non labored breathing   Neurologic: Awake, alert, oriented to name, place and time.   Neuropsychiatric: Calm, cooperative. Normal affect.      Prior Labs/Diagnostic Studies   All labs and imaging personally reviewed   Complete Blood Count no  Diff  Order: [id number]  Component  Ref Range & Units 2 wk ago   WBC  3.5 - 10.5 x10(9)/L 8.3   RBC  3.90 - 5.03 x10(12)/L 5.19 High    Hemoglobin  12.0 - 15.5 g/dL 17.1 High    HCT  34.9 - 44.5 % 50.3 High    MCV  80.0 - 100.0 fL 96.9   MCH  27.6 - 33.3 pg 32.9   MCHC  31.5 - 35.2 g/dL 34.0   RDW  11.9 - 15.5 % 12.9   Platelets  150 - 450 x10(9)/L 264   Automated NRBC  <=0 /100 WBC 0   Resulting [facility]     Specimen Collected: 09/29/24  6:50 PM     Basic Metabolic Panel  Order: [id number]  Component  Ref Range & Units 2 wk ago   Sodium  136 - 145 mmol/L 142   Potassium  3.5 - 5.1 mmol/L 4.5   Chloride  98 - 109 mmol/L 108   CO2  20 - 29 mmol/L 21   Anion Gap  6 - 16 mmol/L 13   Calcium  8.4 - 10.4 mg/dL 9.7   BUN  7 - 26 mg/dL 14   Creatinine  0.55 - 1.02 mg/dL 0.54 Low    Glucose  70 - 100 mg/dL 99   Comment: The given reference range is for the fasting state. Non-fasting reference range for glucose is 70 - 180 mg/dL.   GFR, Estimated  >60 mL/min/1.73m2 >60   Resulting [facility]     Specimen Collected: 09/29/24  6:49 PM     EKG/ stress test - if available please see in ROS above   Echo result w/o MOPS: Interpretation SummaryGlobal and regional left ventricular function is normal with an EF of 55-60%with normal size and thickness. Global right ventricular function and size are normal. Trace to mild aortic insufficiency is present. No pericardial effusion is present. This study was compared with the study from 8/13/19 .No significant changes noted.           Latest Ref Rng & Units 2/5/2021    11:25 AM   PFT   FVC L 3.14    FEV1 L 2.22    FVC% % 99    FEV1% % 90          The patient's records and results personally reviewed by this provider.     Outside records reviewed from: Care Everywhere      Assessment    Randi Cleary is a 69 year old female seen as a PAC referral for risk assessment and optimization for anesthesia.    Plan/Recommendations  Pt will be optimized for the  proposed procedure.  See below for details on the assessment, risk, and preoperative recommendations    NEUROLOGY  - No history of TIA, CVA or seizure    - Chronic Pain/neuropathy/osteoarthritis  Opioid use and gabapentin   Morphine equivilant = 37.5 MME/Day     -Post Op delirium risk factors:  High co-morbid index    ENT  Anesthesia record 2021:    Placement Date: 12/21/21; Placement Time: 1417 (created via procedure documentation); Mask Ventilation: 1; Induction Type: Intravenous; Ease of Intubation: Easy; Technique: Video laryngoscopy; ETT Type: Single; Tube Size: 2 mm; VL Blade Size: Glide scope 3; Grade View: 1; Adjucts: Stylet; Placement Person: CRNA; Attempts: 1; Depth: 22 cm     - Patient has previous history of complicated airway.   Used glidescope with cervical spine surgery   Mallampati: Unable to assess  TM: Unable to assess    CARDIAC  - No history of CAD and Afib    - HTN and pulmonary hypertension  Hold Edecrin DOS  Well followed by Dr. Edwards  Recent RHC with normal left and right filling pressures>>see cardiac testing above.  LVEF 55-60%    - METS (Metabolic Equivalents)  Patient CANNOT perform 4 METS exercise without symptoms             Total Score: 1    Functional Capacity: Unable to complete 4 METS      RCRI-Very low risk: Class 1 0.4% complication rate             Total Score: 0        PULMONARY  - Obstructive Sleep Apnea, untreated  KYAW without home CPAP use.    - COPD, Well controlled  Continue Trelegy Ellipta as prescribed    - Tobacco History    History   Smoking Status    Former    Types: Cigarettes   Smokeless Tobacco    Never       GI  - GERD  Controlled on medications: Proton Pump Inhibitor    PONV High Risk  Total Score: 3           1 AN PONV: Pt is Female    1 AN PONV: Patient is not a current smoker    1 AN PONV: Intended Post Op Opioids        /RENAL  - Baseline Creatinine  see above     ENDOCRINE    - BMI: Estimated body mass index is 27.28 kg/m  as calculated from the  "following:    Height as of this encounter: 1.676 m (5' 6\").    Weight as of this encounter: 76.7 kg (169 lb).  Overweight (BMI 25.0-29.9)  Well followed by Endocrine for >>Pheochromocytoma s/p partial left adrenalectomy (2017)  >>Prediabetes  >>Grave's dz s/p ablation. Now hypothyroid on hormone replacement.       HEME  Follows with hematology for hereditary hemochromatosis and polycythemia.  >>Polycythemia is thought to be secondary to her untreated KYAW   >>Baseline Hgb ~17    - VTE Low Risk 0.26%             Total Score: 1    VTE: Greater than 59 yrs old      - No history of abnormal bleeding or antiplatelet use.    - Denies a h/o anemia or previous blood transfusion    - Hx breast cancer (~30 years ago) s/p surgery & chemoradiation     MSK  -  S/p right ankle arthroplasty    - Gout    - S/p cervical laminoplasty 2021    PSYCH  - Well followed by behavorial health for   >>Depression treating with ECT  >>Borderline personality disorder  >>H/o alcohol use disorder in remission X2 years  >>H/o serotonin syndrome    Different anesthesia methods/types have been discussed with the patient, but they are aware that the final plan will be decided by the assigned anesthesia provider on the date of service.    The patient is optimized for their procedure. Information on surgery time,arrival time, bowel prep and NPO status given by GI nursing staff. PAC RN provided medication instructions for DOS. No further diagnostic testing indicated.    Please refer to the physical examination documented by the anesthesiologist in the anesthesia record on the day of surgery.    Video-Visit Details    Type of service:  Video Visit    Provider received verbal consent for a Video Visit from the patient? Yes   Video Start Time: 7:10  Video End Time:7:30    Originating Location (pt. Location): Home    Distant Location (provider location):  Off-site  Mode of Communication:  Video Conference via Construct  On the day of service:     Prep time: 18 " minutes  Visit time: 20 minutes  Documentation time: 18 minutes  ------------------------------------------  Total time: 56 minutes      DION Grijalva CNP  Preoperative Assessment Center  [facility]  Clinic and Surgery Center  Phone: [phone]  Fax: [phone]

## 2024-10-14 NOTE — PATIENT INSTRUCTIONS
Name:  Randi Cleary   MRN:  [id number]   :  1955   Today's Date:  10/14/2024     GI Lab procedures:    A representative from the GI Lab will contact you regarding date, arrival time, location, eating and drinking instructions, bowel prep instructions, and additional information.      You were seen today in the PAC Clinic.   (Pre-operative Anesthesia Assessment Center)  [address]  [address]   phone [phone]    You had a pre-operative assessment done.    Anesthesia recommendations for medications:    Hold Aspirin for 2 days before procedure.  Hold Multivitamins for 7 days before procedure.   Hold Herbal medications and Supplements for 7 days before procedure. Take the last Cetyl-Myrisfoleate on 10-28-24, and then hold until procedure.  Hold Ibuprofen for 2 days before procedure.   Hold Naproxen for 2 days before procedure.   Acetaminophen (Tylenol) is okay to take if needed.    No alcohol or cannabis products for 24 hours before your procedure.      Please hold the following medications the day of procedure:  Ethacrynic Acid (Edecrin)  KLOR-CON M  Guaifenesin (Mucinex)  Magnesium  Cyanocobalamin (Vitamin B-12)  Vitamin C (Ascorbic Acid)        Please take these medications the day of procedure:  Fluticasone-Umeclidin-Vilanterol (Trelegy Ellipta) inhaler  Omeprazole (Prilosec)  Oxycodone (Roxicodone)  Synthroid  Acetaminophen (Tylenol) if needed  Albuterol inhaler if needed  Allopurinol (Zyloprim) if needed  Gabapentin (Neurontin) if needed  Dextromethorphan-Bupropion ER (Auvelity)- if started prior to procedure    Bring Albuterol inhaler to hospital.      For questions or appointments, call:  [facility]: [phone], option 2.  Monday through Friday, 8 a.m. to 4:30 p.m.  (If it is after hours, please reach out to the clinic or provider that scheduled your appointment)

## 2024-10-14 NOTE — PROGRESS NOTES
Winnie is a 69 year old who is being evaluated via a billable video visit.      [place]   Winnie is a 69 year old, presenting for the following health issues:  Pre-Op Exam      BONITA Alberto LPN

## 2024-10-14 NOTE — OR NURSING
Was asked by Cortrium RAKAN to go over medications with Winnie Cleary for the upcoming GI procedure on 11-5-24. Verbally given medication instructions and instructions sent via My Chart. Winnie has lots of GI questions and concerns and encouraged to follow up with GI. Winnie has no questions for me at this time.

## 2024-10-15 ENCOUNTER — TELEPHONE (OUTPATIENT)
Dept: CARDIOLOGY | Facility: CLINIC | Age: 69
End: 2024-10-15
Payer: MEDICARE

## 2024-10-15 NOTE — TELEPHONE ENCOUNTER
Left Voicemail (1st Attempt) for the patient to call back and schedule the following:    Appointment type: RETURN PULM HYPERTENSION  Provider: OSCAR  Return date: 11/19/2024  Specialty phone number: [phone] OPT 1  Additional appointment(s) needed: N/A  Additonal Notes: PT SAYS SHE WILL CALL BACK LATER TODAY. [facility] IS OUT OF OFFICE ON 11/27 SO OFFER OSCAR APPT.

## 2024-10-16 ENCOUNTER — PATIENT OUTREACH (OUTPATIENT)
Dept: CARE COORDINATION | Facility: CLINIC | Age: 69
End: 2024-10-16
Payer: MEDICARE

## 2024-10-16 ASSESSMENT — ACTIVITIES OF DAILY LIVING (ADL): DEPENDENT_IADLS:: SHOPPING;CLEANING;LAUNDRY;TRANSPORTATION

## 2024-10-16 NOTE — PROGRESS NOTES
Clinic Care Coordination Contact  Clinic Care Coordination Contact  OUTREACH    Referral Information:  Referral Source: PCP    Primary Diagnosis: Behavioral Health    Chief Complaint   Patient presents with    Clinic Care Coordination - Initial        Universal Utilization:   Clinic Utilization  Difficulty keeping appointments:: No  Compliance Concerns: No  No-Show Concerns: No  No PCP office visit in Past Year: No  Utilization      No Show Count (past year)  17             ED Visits  1             Hospital Admissions  2                    Current as of: 10/16/2024 12:07 PM                Clinical Concerns:  Current Medical Concerns:  Patient is a 68 year old woman with a history of severe major depression, Type 2 diabetes mellitus without complication, pulmonary hypertension, chronic intractable pain, anxiety, COPD, OAS, psoriasis, HTN, morbid obesity, history of breast cancer, hemochromatosis, hypothyroidism, vitamin D deficiency, vitamin B12 deficiency, bipolar trauma.   Patient continues to report  chronic pain. She said she has been working closely with Dr. Dubois regarding her pain concerns. She said she is wary of trying new pain medications. Patient has a follow up with Dr. Dubois on 10/18/24.   Patient recently switched her primary care to [facility]. She stated she said she like her new PCP.           Current Behavioral Concerns: Patient reported she continues to have ECT treatments, and feels the treatments are have been helpful.     Education Provided to patient: Discussed the importance taking her medications as directed. Encouraged her to attend all scheduled appointments. Encouraged her to contact Care Coordinator for any additional needs or concerns.    Pain  Pain (GOAL):: No  Health Maintenance Reviewed: Due/Overdue   Health Maintenance Due   Topic Date Due    HF ACTION PLAN  Never done    DIABETIC FOOT EXAM  Never done    COPD ACTION PLAN  Never done    HEPATITIS A IMMUNIZATION (1 of 2 -  Risk 2-dose series) Never done    ZOSTER IMMUNIZATION (1 of 2) Never done    MEDICARE ANNUAL WELLNESS VISIT  Never done    COLORECTAL CANCER SCREENING  02/25/2024        Medication Management:  Medication review status: Medications reviewed and no changes reported per patient.        Patient manages her medications independently.      Functional Status:  Dependent ADLs:: Ambulation-cane, Bathing  Dependent IADLs:: Shopping, Cleaning, Laundry, Transportation  Bed or wheelchair confined:: No  Mobility Status: Independent w/Device  Fallen 2 or more times in the past year?: No  Any fall with injury in the past year?: No    Living Situation:  Current living arrangement:: I live in a private home with spouse  Type of residence:: Private home - stairs    Lifestyle & Psychosocial Needs:    Social Determinants of Health     Food Insecurity: High Risk (12/20/2023)    Food Insecurity     Within the past 12 months, did you worry that your food would run out before you got money to buy more?: Yes     Within the past 12 months, did the food you bought just not last and you didn t have money to get more?: Yes   Depression: Not at risk (10/8/2024)    PHQ-2     PHQ-2 Score: 2   Recent Concern: Depression - At risk (10/1/2024)    PHQ-2     PHQ-2 Score: 4   Housing Stability: High Risk (12/20/2023)    Housing Stability     Do you have housing? : Yes     Are you worried about losing your housing?: Yes   Tobacco Use: Medium Risk (10/14/2024)    Patient History     Smoking Tobacco Use: Former     Smokeless Tobacco Use: Never     Passive Exposure: Current   Financial Resource Strain: High Risk (12/20/2023)    Financial Resource Strain     Within the past 12 months, have you or your family members you live with been unable to get utilities (heat, electricity) when it was really needed?: Yes   Alcohol Use: Not on file   Transportation Needs: High Risk (12/20/2023)    Transportation Needs     Within the past 12 months, has lack of  transportation kept you from medical appointments, getting your medicines, non-medical meetings or appointments, work, or from getting things that you need?: Yes   Physical Activity: Not on file   Interpersonal Safety: Low Risk  (8/21/2024)    Interpersonal Safety     Do you feel physically and emotionally safe where you currently live?: Yes     Within the past 12 months, have you been hit, slapped, kicked or otherwise physically hurt by someone?: No     Within the past 12 months, have you been humiliated or emotionally abused in other ways by your partner or ex-partner?: No   Stress: Not on file   Social Connections: Not on file   Health Literacy: Not on file     Diet:: Regular  Inadequate nutrition (GOAL):: No  Tube Feeding: No  Inadequate activity/exercise (GOAL):: Yes  Significant changes in sleep pattern (GOAL): No  Transportation means:: Family, Regular car     Christianity or spiritual beliefs that impact treatment:: No  Mental health DX:: Yes  Mental health DX how managed:: Medication, Outpatient Counseling, Psychiatrist, Day Treatment  Mental health management concern (GOAL):: No  Chemical Dependency Status: No Current Concerns  Informal Support system:: Family, Friends, Spouse        Financial Concerns: Patient denied any financial concerns.      Resources and Interventions:  Current Resources:      Community Resources: None  Supplies Currently Used at Home: None  Equipment Currently Used at Home: grab bar, tub/shower, grab bar, toilet, shower chair, raised toilet seat  Employment Status: disabled         Advance Care Plan/Directive  Advanced Care Plans/Directives on file:: No    Referrals Placed: None       Care Plan:  Care Plan: Mental Health       Problem: Mental Health Symptoms Need Improvement       Goal: I would like to feel as though my mental health has improved.       Start Date: 10/16/2024 Expected End Date: 10/15/2025    Recent Progress: 50%    Priority: High    Note:     Barriers: history of  anxiety, bipolar disorder, trauma related disorder  Strengths: strong advocate for herself  Patient expressed understanding of goal: yes  Action steps to achieve this goal:  1. I will continue to attend all appointments with my therapist Mary Kay Gomez and with my new psychiatric provide.   2. I will continue to attend all appointments with the [facility] partial-hospitalization program.   3. I will continue to the use the skills I have learned through therapy and the partial hospitalization program.   4. I will take my medications as directed.   5. I will report progress towards this goal at schedule outreach telephone calls from my Care Coordinator.                                 Patient/Caregiver understanding: Verbalized understanding of goal and other information discussed at today's assessment.     Outreach Frequency: monthly, more frequently as needed  Future Appointments                In 2 days Dusty Dubois MD [facility]    In 2 days Magui Ribera Psy.D, LP [facility] Mental Health and Addiction Clinic [place], [facility]    In 5 days Jeannette Mendoza APRN CNP [facility] & Addiction [facility] MSA CLIN    In 6 days Valdo Harper; Daniella Gaona, PhD LP  Physicians Psychiatry [facility]    In 1 week Arlyn Stern PA [facility]    In 1 week Boo Riley MD [facility]    In 1 week Valdo Harper; Daniella Gaona, PhD LP  [facility]    In 2 weeks Erika Colorado LMFT M [facility] Mental Health & Addiction [facility], [facility]    In 3 weeks Erika Colorado LMFT M [facility] Mental Health & Addiction [place] [facility], [facility]    In 4 weeks Erika Colorado LMFT M [facility] Mental [facility] & Addiction [place] [facility], [facility]    In 1 month  Erika Colorado LMFT M [facility] Mental Health & Addiction [facility], [facility]    In 1 month Erika Colorado LMFT M [facility] Mental Health & Addiction [facility], [facility]    In 1 month Erika Colorado LMFT M [facility] Mental Health & Addiction [facility], [facility]    In 2 months Erika Colorado LMFT M [facility] Mental Health & Addiction [facility], [facility]    In 2 months Erika Colorado LMFT M [facility] Mental Health & Addiction [facility], [facility] JOEL    In 2 months Erika Colorado LMFT M [facility] Mental Health & Addiction [facility], [facility]    In 3 months Alee Coker MD [facility] Endocrinology Clinic [facility]    In 4 months [facility] LAB DRAW [facility]    In 4 months Tova Pablo MD [facility]            Plan: CCC RN will continue to monitor, support patient with current goal and will be available to assist as nursing needs arise.

## 2024-10-16 NOTE — LETTER
[facility]  Patient Centered Plan of Care  About Me:        Patient Name:  Randi Zhou,     It was nice to chat with you today! Here is a copy of your Care Plan with the goal we are supporting you with at this time. Please let me know if I can help in any other way.     Thanks,     Dave Myhre, RN   [facility] RN  [phone]  YOB: 1955  Age:         69 year old   [place] MRN:    [id number] Telephone Information:  Home Phone [phone]   Mobile [phone]       Address:  [address] [place] [address] Email address:  [email or web address]      Emergency Contact(s)    Name Relationship Lgl Grd Work Phone Home Phone Mobile Phone   1. JOANIE ROBERTO Spouse No NONE [phone]   2. ANGELA ROBERTO Sister-in-Law   [phone]   3. BRITTNY PADILLA Friend   [phone]            Primary language:  English     needed? No   [employer]:  [phone] op. 1  Other communication barriers:None    Preferred Method of Communication:     Current living arrangement: I live in a private home with spouse    Mobility Status/ Medical Equipment: Independent w/Device        Health Maintenance  Health Maintenance Reviewed: Due/Overdue   Health Maintenance Due   Topic Date Due    HF ACTION PLAN  Never done    DIABETIC FOOT EXAM  Never done    COPD ACTION PLAN  Never done    HEPATITIS A IMMUNIZATION (1 of 2 - Risk 2-dose series) Never done    ZOSTER IMMUNIZATION (1 of 2) Never done    MEDICARE ANNUAL WELLNESS VISIT  Never done    COLORECTAL CANCER SCREENING  02/25/2024          My Access Plan  Medical Emergency 911   Primary Clinic Line [facility] [phone]   24 Hour Appointment Line [phone] or  [phone]) (toll-free)   24 Hour Nurse Line [phone] (toll-free)   Preferred Urgent Care [facility], [phone]     [facility]  [phone]     Preferred Pharmacy [id number] [place], [place] - [email or web address]     Behavioral Health Crisis Line The National Suicide Prevention Lifeline at 1-501.199.7697 or Text/Call 988           My Care Team Members  Patient Care Team         Relationship Specialty Notifications Start End    Kaushik Hobbs CNP PCP - General Nurse Practitioner - Family  8/21/24     Phone: [phone] Fax: [phone] [address] [facility] [address] [facility] [address]    Eli Hilton, RN Specialty Care Coordinator Cardiology Admissions 8/13/19     Pulmonary HTN    Phone: [phone] Pager: [phone] Fax: [phone]       Bindu Walden, RN Specialty Care Coordinator Cardiology Admissions 8/26/19     Pulmonary HTN    Phone: [phone] Pager: [phone] Fax: [phone]       Alee Coker MD MD INTERNAL MEDICINE - ENDOCRINOLOGY, DIABETES & METABOLISM  9/26/19     Phone: [phone] Fax: [phone]         [address] [place] [address] [address] [facility] [address]    Francisco J Edwards MD MD Cardiology  3/17/20     Phone: [phone] Fax: [phone]         [address] [facility] [address]    Francisco J Edwards MD Assigned Heart and Vascular Provider   10/23/20     Phone: [phone] Fax: [phone]         [address] [place] [address]    Liza Reynoso, RN Specialty Care Coordinator Cardiology Admissions 4/5/22     Pulmonary HTN    Phone: [phone] Pager: [phone] Fax: [phone]       Waylon Catherine, PharmD Pharmacist Pharmacist  10/3/22     Phone: [phone] Fax: [phone]         [facility] [address] [place] [address]    Dusty Dubois MD Assigned Pain Medication Provider   1/9/23     Phone: [phone] Fax: [phone] [address] [facility] [address]    Johan England [facility] Pharmacist   3/1/23     Phone: [phone] Fax: [phone]          [address] [place] [address]    Aparna Hutchinson MD MD Neurology  3/27/23     Phone: [phone] Fax: [phone]         [address] [place] [address]    Johan England [facility] Pharmacist Pharmacist  4/1/23     Phone: [phone] Fax: [phone]         [address] [place] [address]    Miki Richardson, RN Specialty Care Coordinator  Admissions 4/19/23     Phone: [phone] Pager: [phone]        Roland Das MD MD Psychiatry  7/27/23     Phone: [phone] Fax: [phone]         [facility] [address] [place] [address]    Tova Pablo MD Assigned Cancer Care Provider   8/5/23     Phone: [phone] Fax: [phone]         [address] [place] [address]    Mami Caballero MD Assigned Neuroscience Provider   9/16/23     Phone: [phone] Pager: [phone] Fax: [phone]        [address] [place] [address]    Laith Constantino MD Assigned PCP   10/21/23     Phone: [phone] Fax: [phone]         [address] [place] [address]    Kaushik Huffman DPM Assigned Surgical Provider   10/21/23     Phone: [phone] Fax: [phone]         [address] [place] [address] [address] [facility] [address]    Felicia Hicks, PharmD Pharmacist Pharmacist  10/31/23     Phone: [phone] Fax: [phone]         [address] [place] [address]    Felicia Hicks, PharmD Assigned MTM Pharmacist   11/4/23     Phone: [phone] Fax: [phone]         [address] [place] [address]    Amy Hughes [facility] Pharmacist   12/29/23     Phone: [phone] Fax: [phone]         [facility] [address] [facility] [address] [facility] [address]    Alee Coker MD Assigned Endocrinology Provider   1/6/24     Phone: [phone] Fax: [phone]         [address] [place] [address] [address] [facility] [address]    Sharmila Gregory MD Assigned Pulmonology Provider   2/23/24      Phone: [phone] Fax: [phone]         [address] [facility] [address]  [facility] [address]    Crissy Musa LPN Specialty Care Coordinator Hematology & Oncology Admissions 4/16/24     Jeannette Mendoza APRN CNP Nurse Practitioner Psychiatry  6/27/24     Phone: [phone] Fax: [phone]         [address] [facility] [address]    Teetee Adames MD MD Cardiovascular Disease  7/16/24     Phone: [phone] Fax: [phone]         [address] [facility] [address]    Tiara Javier APRN CNS Clinical Nurse Specialist Anesthesiology  7/16/24     Phone: [phone] Fax: [phone]         [address] [facility] [address] [facility] [address]    Jeannette Mendoza APRN CNP Assigned Behavioral Health Provider   9/23/24     Phone: [phone] Fax: [phone] [address],  [facility] [address]    Arlyn Stern PA Physician Assistant Cardiovascular Disease  10/15/24     Phone: [phone] Pager: [phone] Fax: [phone]        [facility] HEART CARE [address] [facility] [address]                My Care Plans  Self Management and Treatment Plan    Care Plan  Care Plan: Mental Health       Problem: Mental Health Symptoms Need Improvement       Goal: I would like to feel as though my mental health has improved.       Start Date: 10/16/2024 Expected End Date: 10/15/2025    Recent Progress: 50%    Priority: High    Note:     Barriers: history of anxiety, bipolar disorder, trauma related disorder  Strengths: strong advocate for herself  Patient expressed understanding of goal: yes  Action steps to achieve this goal:  1. I will continue to attend all appointments with my therapist, with my psychiatric provider and attend all ECT treatments.   2. I continue to attend my group therapy appointments.   3. I will continue to the use the skills I have learned through therapy, partial hospitalization program and therapy group.   4. I will take my medications as directed.   5. I will report  progress towards this goal at schedule outreach telephone calls from my Care Coordinator.                                 Action Plans on File:                       Advance Care Plans/Directives:   Advanced Care Plan/Directives on file:   No    Discussed with patient/caregiver(s): No data recorded           My Medical and Care Information  Problem List   Patient Active Problem List   Diagnosis    DJD (degenerative joint disease), ankle and foot    Hereditary hemochromatosis (H)    Pulmonary hypertension (H)    Postablative hypothyroidism    Hx of corticosteroid therapy    Class 2 obesity due to excess calories without serious comorbidity in adult    KYAW (obstructive sleep apnea)    Prediabetes    Hypokalemia    COPD (chronic obstructive pulmonary disease) (H)    Generalized anxiety disorder    Restless leg syndrome    Vitamin B12 deficiency    Vitamin D deficiency    Morbid obesity (H)    Cord compression (H)    History of cervical spinal surgery    Cervical stenosis of spinal canal    Alcohol use disorder, moderate, in sustained remission (H)    Essential hypertension    Psoriatic arthropathy (H)    Primary osteoarthritis involving multiple joints    Gastroesophageal reflux disease with esophagitis without hemorrhage    Numbness in both hands    Opioid type dependence, continuous (H)    Trauma and stressor-related disorder    Post-menopausal    Depression with anxiety    Severe recurrent major depression without psychotic features (H)    MDD (major depressive disorder), recurrent episode, severe (H)      Current Medications and Allergies:  See printed Medication Report.    Care Coordination Start Date: 9/21/2023   Frequency of Care Coordination: monthly, more frequently as needed     Form Last Updated: 10/16/2024

## 2024-10-16 NOTE — PROGRESS NOTES
Progress Note    Patient Name: Randi Cleary  Date: 10/6/2020         Service Type: Individual      Session Start Time: 2:00 PM  Session End Time: 2:51 PM     Session Length: 51 minutes    Session #: 14    Attendees: Client attended alone    Service Modality:  Video Visit:    Telemedicine Visit: The patient's condition can be safely assessed and treated via synchronous audio and visual telemedicine encounter.      Reason for Telemedicine Visit: Services only offered telehealth    Originating Site (Patient Location): Patient's home    Distant Site (Provider Location): Provider Remote Setting    Consent:  The patient/guardian has verbally consented to: the potential risks and benefits of telemedicine (video visit) versus in person care; bill my insurance or make self-payment for services provided; and responsibility for payment of non-covered services.     Mode of Communication:  Video Conference via [employer]    As the provider I attest to compliance with applicable laws and regulations related to telemedicine.      Treatment Plan Last Reviewed: 9/16/20  PHQ-9 / CHAVO-7 :   PHQ 9/10/2020 9/23/2020 10/6/2020   PHQ-9 Total Score 7 17 18   Q9: Thoughts of better off dead/self-harm past 2 weeks Not at all Several days More than half the days   F/U: Thoughts of suicide or self-harm - - -   F/U: Safety concerns - - -     CHAVO-7 SCORE 9/10/2020 9/23/2020 10/6/2020   Total Score 16 (severe anxiety) - -   Total Score 16 19 17         DATA  Interactive Complexity: No  Crisis: No       Progress Since Last Session (Related to Symptoms / Goals / Homework):   Symptoms: Patient continues to express that she feels rage    Homework: Partially completed  Add swimming to your routine -done  Consider meditation to help manage mood -not done  Get out on porch -done     Episode of Care Goals: Minimal progress - PREPARATION (Decided to change - considering how); Intervened by negotiating a change  "plan and determining options / strategies for behavior change, identifying triggers, exploring social supports, and working towards setting a date to begin behavior change     Current / Ongoing Stressors and Concerns:   Patient is currently socially isolated. She has a conflictual relationship with her .  She is getting minimal physical activity due to her pool being closed.      Treatment Objective(s) Addressed in This Session:   Patient will increase frequency of engaging her in ADLs.  Patient will track and record at least 5 pleasant exchanges with . Patient will be able to identify at least 5 positive traits about her .  Patient will reduce level of depressive and anxious features as evidenced by reduction in score on her CHAVO-7 and PHQ-9 (scores of 15 and 16 at first measurment, respectively).     Intervention:   Person-Centered Therapy: Updated PHQ-9 and CHAVO-7, which led to a discussion about the thoughts of thinking she'd be better off dead.  Patient talked in depth about her relationship and how this is a contributing factor to how she is feeling. She also reported she has been thinking more about her family and recognizing she's \"pissed off\" at them, which leads to her thinking it would be easier if she were not around.  She says this first is usually anger and destruction, then turns to the thoughts of not being around.     She turned her attention to needing to clean up around her house, specifically showing provider where things were a mess on her desk.  Provider helped patient try to connect this to her thoughts of thinking she'd be better off dead as this was the question that was asked of her. She was able to eventually explain that her thinking that she is overwhelmed leads to her thinking it would be better off dead.  However, more than anything right now she needs to get away from her , per her report. Helped patient try to sort out where to start from here to manage her " mood and these destructive thoughts. Agreed to also try to help break down all of the things she needed to do to manage feeling overwhelmed. She decided to start by focusing on her CPAP machine.     Discussed her health and her rage and how she thinks this may be related to a pheochromocytoma. She had discussed this with her doctor and plans to do a blood test to rule this out. She also plans to use the CPAP to see if this helps with the rage. Then she would be willing to have a discussion again with her psychiatrist about an antidepressant.         ASSESSMENT: Current Emotional / Mental Status (status of significant symptoms):   Risk status (Self / Other harm or suicidal ideation)   Patient denies current fears or concerns for personal safety.   Patient denies current or recent suicidal ideation or behaviors.   Patient denies current or recent homicidal ideation or behaviors.   Patient denies current or recent self injurious behavior or ideation.   Patient denies other safety concerns.   Patient reports there has been no change in risk factors since their last session.     Patient reports there has been no change in protective factors since their last session.     Recommended that patient call 911 or go to the local ED should there be a change in any of these risk factors.     Appearance:   Appropriate    Eye Contact:   Poor   Psychomotor Behavior: Normal    Attitude:   Cooperative    Orientation:   All   Speech    Rate / Production: Normal/ Responsive    Volume:  Normal    Mood:    Anxious  Sad    Affect:    Tearful Worrisome    Thought Content:  Rumination    Thought Form:  Obsessive    Insight:    Fair      Medication Review:   No changes to current psychiatric medication(s)     Medication Compliance:   Yes     Changes in Health Issues:   None reported     Chemical Use Review:   Substance Use: Chemical use reviewed, no active concerns identified      Tobacco Use: No current tobacco use.      Diagnosis:  1. MDD  "(major depressive disorder), recurrent episode, moderate (H)    2. Anxiety associated with depression    3. CHAVO (generalized anxiety disorder)      Collateral Reports Completed:   Not Applicable    PLAN: (Patient Tasks / Therapist Tasks / Other)  Resume using CPAP as directed by doctor  Work with doctor to rule out any other medical concerns that could be contributing to mood              B MICAELA BAKER    2020                                                         ______________________________________________________________________    Treatment Plan    Patient's Name: Randi Cleary  YOB: 1955    Date: 2020    DSM5 Diagnoses: 296.32 (F33.1) Major Depressive Disorder, Recurrent Episode, Moderate With anxious distress  Psychosocial / Contextual Factors: Patient's entire family of origin has , she now has a sister-in-law and  as support.  Relationship with  is conflictual.  Patient is reporting increase in physical symptoms due to COVID-19.  Patient would like to get off of pain medications.  WHODAS: 42    Referral / Collaboration:  Referral to another professional/service is not indicated at this time.    Anticipated number of session or this episode of care: 12-15      MeasurableTreatment Goal(s) related to diagnosis / functional impairment(s)  Goal 1: Patient will \"jumpstart, getting going with the things I need to be doing around the house as far as picking up, doing things, trying to do something every day.  Also to lessen the animosity between me and my .\"    I will know I've met my goal when my shoulders are fixed and I can see.      Objective #A (Patient Action)    Patient will increase frequency of engaging her in ADLs.  Status: Continued - Date(s): 20    Intervention(s)  Therapist will engage patient in CBT, specifically behavioral activation.    Objective #B  Patient will track and record at least 5 pleasant exchanges with . " Patient will be able to identify at least 5 positive traits about her . and how he relates to her.  Status: Continued - Date(s): 9/16/20    Intervention(s)  Therapist will teach assertiveness skills and assign homework related to relationship interactions.    Objective #C  Patient will reduce level of depressive and anxious features as evidenced by reduction in score on her CHAVO-7 and PHQ-9 (scores of 15 and 16 at first measurment, respectively).  Status: Continued - Date(s): 9/16/20    Intervention(s)  Therapist will engage patient in person-centered therapy and CBT.    Patient has reviewed and agreed to the above plan.      MICAELA SLADE  September 16, 2020   Render In Strict Bullet Format?: No Detail Level: Generalized Initiate Treatment: Nutrafol Womens Balance vitamins: take 4 capsules daily for 3 months, sold here\\n\\nMinoxidil 5% Mens solution or foam: massage in the scalp nightly, sold over the counter\\n\\nSpironolactone 25mg: take one tablet daily

## 2024-10-17 ENCOUNTER — APPOINTMENT (OUTPATIENT)
Dept: URBAN - METROPOLITAN AREA CLINIC 260 | Age: 69
Setting detail: DERMATOLOGY
End: 2024-10-17

## 2024-10-17 VITALS — HEIGHT: 66 IN | WEIGHT: 166 LBS

## 2024-10-17 DIAGNOSIS — L72.0 EPIDERMAL CYST: ICD-10-CM

## 2024-10-17 DIAGNOSIS — L21.8 OTHER SEBORRHEIC DERMATITIS: ICD-10-CM

## 2024-10-17 PROBLEM — L30.9 DERMATITIS, UNSPECIFIED: Status: ACTIVE | Noted: 2024-10-17

## 2024-10-17 PROCEDURE — OTHER COUNSELING: OTHER

## 2024-10-17 PROCEDURE — OTHER PRESCRIPTION MEDICATION MANAGEMENT: OTHER

## 2024-10-17 PROCEDURE — OTHER MIPS QUALITY: OTHER

## 2024-10-17 PROCEDURE — 99204 OFFICE O/P NEW MOD 45 MIN: CPT

## 2024-10-17 PROCEDURE — OTHER PRESCRIPTION: OTHER

## 2024-10-17 PROCEDURE — OTHER ADDITIONAL NOTES: OTHER

## 2024-10-17 RX ORDER — CLOBETASOL PROPIONATE 0.5 MG/G
0.05% CREAM TOPICAL BID
Qty: 60 | Refills: 5 | Status: ERX | COMMUNITY
Start: 2024-10-17

## 2024-10-17 RX ORDER — KETOCONAZOLE 20 MG/ML
2% SHAMPOO, SUSPENSION TOPICAL QD
Qty: 120 | Refills: 3 | Status: ERX | COMMUNITY
Start: 2024-10-17

## 2024-10-17 RX ORDER — CLOBETASOL PROPIONATE 0.5 MG/ML
SOLUTION TOPICAL
Qty: 50 | Refills: 6 | Status: ERX | COMMUNITY
Start: 2024-10-17

## 2024-10-17 ASSESSMENT — LOCATION DETAILED DESCRIPTION DERM
LOCATION DETAILED: LEFT CRUS OF HELIX
LOCATION DETAILED: POSTERIOR MID-PARIETAL SCALP
LOCATION DETAILED: RIGHT CRUS OF HELIX
LOCATION DETAILED: RIGHT MEDIAL MALAR CHEEK

## 2024-10-17 ASSESSMENT — LOCATION ZONE DERM
LOCATION ZONE: FACE
LOCATION ZONE: SCALP
LOCATION ZONE: EAR

## 2024-10-17 ASSESSMENT — LOCATION SIMPLE DESCRIPTION DERM
LOCATION SIMPLE: LEFT EAR
LOCATION SIMPLE: RIGHT EAR
LOCATION SIMPLE: RIGHT CHEEK
LOCATION SIMPLE: POSTERIOR SCALP

## 2024-10-17 NOTE — PROCEDURE: ADDITIONAL NOTES
Additional Notes: Discussed cosmetic extraction as a treatment option. Estimate of $200 given today. Patient will schedule a future appointment to have this done. 
Detail Level: Simple
Render Risk Assessment In Note?: no

## 2024-10-17 NOTE — HPI: RASH
How Severe Is Your Rash?: mild
Is This A New Presentation, Or A Follow-Up?: Rash
Additional History: Patient has had a rash on her scalp, hands, and ears on and off for years and would like to discuss options to manage symptoms. She also has whiteheads on her face she would like evaluated.

## 2024-10-17 NOTE — PROCEDURE: PRESCRIPTION MEDICATION MANAGEMENT
Render In Strict Bullet Format?: No
Detail Level: Simple
Initiate Treatment: Ketoconazole shampoo. Wash scalp and ears in the shower with shampoo. Lather and let it sit 3-5 minutes before rinsing\\n\\nClobetasol solution. Apply to scalp and ears twice daily for up to 3 weeks. Take 1 week off before restarting if needed

## 2024-10-18 ENCOUNTER — VIRTUAL VISIT (OUTPATIENT)
Dept: BEHAVIORAL HEALTH | Facility: CLINIC | Age: 69
End: 2024-10-18
Payer: MEDICARE

## 2024-10-18 ENCOUNTER — OFFICE VISIT (OUTPATIENT)
Dept: PALLIATIVE MEDICINE | Facility: OTHER | Age: 69
End: 2024-10-18
Attending: ANESTHESIOLOGY
Payer: MEDICARE

## 2024-10-18 VITALS
SYSTOLIC BLOOD PRESSURE: 131 MMHG | BODY MASS INDEX: 26.95 KG/M2 | OXYGEN SATURATION: 95 % | HEART RATE: 80 BPM | WEIGHT: 167 LBS | DIASTOLIC BLOOD PRESSURE: 69 MMHG

## 2024-10-18 DIAGNOSIS — M19.071 OSTEOARTHRITIS OF RIGHT ANKLE AND FOOT: ICD-10-CM

## 2024-10-18 DIAGNOSIS — G25.81 RESTLESS LEGS SYNDROME (RLS): ICD-10-CM

## 2024-10-18 DIAGNOSIS — F33.2 SEVERE RECURRENT MAJOR DEPRESSION WITHOUT PSYCHOTIC FEATURES (H): Primary | ICD-10-CM

## 2024-10-18 PROCEDURE — 99207 PR NO CHARGE LOS: CPT | Mod: 95 | Performed by: PSYCHOLOGIST

## 2024-10-18 PROCEDURE — 99215 OFFICE O/P EST HI 40 MIN: CPT | Performed by: ANESTHESIOLOGY

## 2024-10-18 PROCEDURE — G0463 HOSPITAL OUTPT CLINIC VISIT: HCPCS | Performed by: ANESTHESIOLOGY

## 2024-10-18 PROCEDURE — G2211 COMPLEX E/M VISIT ADD ON: HCPCS | Performed by: ANESTHESIOLOGY

## 2024-10-18 RX ORDER — KETOCONAZOLE 20 MG/ML
SHAMPOO, SUSPENSION TOPICAL
COMMUNITY
Start: 2024-10-17

## 2024-10-18 RX ORDER — ROPINIROLE 2 MG/1
2 TABLET, FILM COATED ORAL 3 TIMES DAILY
Qty: 270 TABLET | Refills: 3 | Status: SHIPPED | OUTPATIENT
Start: 2024-10-18

## 2024-10-18 RX ORDER — OXYCODONE HYDROCHLORIDE 5 MG/1
5 TABLET ORAL
Qty: 70 TABLET | Refills: 0 | Status: SHIPPED | OUTPATIENT
Start: 2024-10-18 | End: 2024-11-06

## 2024-10-18 ASSESSMENT — PAIN SCALES - GENERAL: PAINLEVEL: EXTREME PAIN (8)

## 2024-10-18 NOTE — PROGRESS NOTES
[facility] Progress Note    Patient Name:   Randi Cleary Date: October 18, 2024          Service Type: Individual        VISIT TIME START: 1504      VISIT TIME END: 1551    46 min   Session Length:  TC Session Length: 45 (38-52 Minutes)    Session #:  3    Attendees: TC Attendees: Client alone    Service Modality:  Service Modality: Video Visit:      Provider verified identity through the following two step process.  Patient provided:  Patient is known previously to provider    Telemedicine Visit: The patient's condition can be safely assessed and treated via synchronous audio and visual telemedicine encounter.      Reason for Telemedicine Visit: Patient has requested telehealth visit    Originating Site (Patient Location): Patient's home    Distant Site (Provider Location): Provider Remote Setting- Home Office    Consent:  The patient/guardian has verbally consented to: the potential risks and benefits of telemedicine (video visit) versus in person care; bill my insurance or make self-payment for services provided; and responsibility for payment of non-covered services.     Patient would like the video invitation sent by:  My Chart    Mode of Communication:  [facility] Conference via [facility]    Distant Location (Provider):  Off-site    As the provider I attest to compliance with applicable laws and regulations related to telemedicine.    Informed Consent and Assessment Methods    This provider and patient discussed HIPAA, and the limits of confidentiality; including mandated reporting, the possibility of collaborative discussions with patient's primary care provider and the multidisciplinary team in the MH clinic during consultation.  Discussed the no show policy, and the benefits and possible unintended consequences of therapy.  Patient indicated understanding [facility] services are short term, solution focused, until a referral can be made and patient can bridge to long term therapy.  Patient  "verbally indicated understanding the informed consent.        DATA  Interactive Complexity: No  Crisis: No        Progress Since Last Session (Related to Symptoms / Goals / Homework):   Symptoms: Worsening Increase in irritability, overwhelm, frustration   Homework: Partially completed      Episode of Care Goals: Satisfactory progress - PREPARATION (Decided to change - considering how); Intervened by negotiating a change plan and determining options / strategies for behavior change, identifying triggers, exploring social supports, and working towards setting a date to begin behavior change     Current / Ongoing Stressors and Concerns:   Increase in irritability, anger outbursts, states she feels like she is taking her anger out at her spouse.  Feels increasingly overwhelmed with appointments, finances, her past.  Reviewed healthy coping skills, how she can feel increase in self control.    States she went to her childhood home and is processing a lot.  Discussed writing as an opportunity to \"process\" feelings.  Discussed containment and emotional regulation as it relates to \"processing\" and writing.      Treatment Objective(s) Addressed in This Session:   Decrease frequency and intensity of feeling down, depressed, hopeless  Identify negative self-talk and behaviors: challenge core beliefs, myths, and actions       Intervention:   Solution Focused Brief Therapy   Brief Psychotherapy - discussed and prioritizing the most efficient treatment., Cognitive Behavioral Therapy (CBT) - Discussed changing thoughts is the path to changing behaviors and feelings., and Person-Centered Therapy - Actualizes potential and moves towards increased awareness, spontaneity, trust in self and inner directedness.    Assessments completed prior to visit:  The following assessments were completed by patient for this visit:  [place] Suicide Severity Rating Scale (Short Version)      1/9/2023     1:00 PM 5/21/2024     4:49 PM 5/21/2024     " 9:00 PM 6/6/2024     8:00 AM 7/1/2024    12:00 PM 8/7/2024     3:00 PM 10/13/2024     4:00 PM   [place] Suicide Severity Rating (Short Version)   Q1 Wished to be Dead (Past Month) yes 1-->yes 1-->yes 1-->yes      Q2 Suicidal Thoughts (Past Month) no 1-->yes 1-->yes 1-->yes      Q3 Suicidal Thought Method no 0-->no 0-->no 1-->yes      Q4 Suicidal Intent without Specific Plan no 1-->yes 0-->no 0-->no      Q5 Suicide Intent with Specific Plan no 0-->no 0-->no 1-->yes      Q6 Suicide Behavior (Lifetime) yes 1-->yes 0-->no 1-->yes      If yes to Q6, within past 3 months? no 1-->yes 0-->no 0-->no      Level of Risk per Screen moderate risk high risk moderate risk high risk      1. Wish to be Dead (Since Last Contact)     Y Y Y   Wish to be Dead Description (Since Last Contact)      tired of living for medical appts overwhelmed with medical issues   2. Non-Specific Active Suicidal Thoughts (Since Last Contact)     Y N N   Actual Attempt (Since Last Contact)      N    Has subject engaged in non-suicidal self-injurious behavior? (Since Last Contact)      N    Interrupted Attempts (Since Last Contact)      N    Aborted or Self-Interrupted Attempt (Since Last Contact)      N    Preparatory Acts or Behavior (Since Last Contact)      N    Suicide (Since Last Contact)      N    Calculated C-SSRS Risk Score (Since Last Contact)     Low Risk Low Risk Low Risk         ASSESSMENT: Current Emotional / Mental Status (status of significant symptoms):   Risk status (Self / Other harm or suicidal ideation)   Patient denies current fears or concerns for personal safety.   Patient reports the following current or recent suicidal ideation or behaviors: passive ideation reported, no plan or intent.   Patient denies current or recent homicidal ideation or behaviors.   Patient denies current or recent self injurious behavior or ideation.   Patient denies other safety concerns.   Patient reports there has been no change in risk factors since  their last session.     Patient reports there has been no change in protective factors since their last session.     A safety and risk management plan has been developed including: Patient consented to co-developed safety plan on 6/4/2024.  Safety and risk management plan was reviewed.   Patient agreed to use safety plan should any safety concerns arise.  A copy was made available to the patient.     Appearance:   Appropriate    Eye Contact:   Good    Psychomotor Behavior: Normal    Attitude:   Cooperative    Orientation:   All   Speech    Rate / Production: Normal     Volume:  Normal    Mood:    Angry  Depressed  Sad    Affect:    Labile    Thought Content:  Clear    Thought Form:  Coherent    Insight:    Good      Medication Review:   No changes to current psychiatric medication(s)     Medication Compliance:   Yes     Changes in Health Issues:   None reported     Chemical Use Review:   Substance Use: Chemical use reviewed, no active concerns identified      Tobacco Use: Did not discuss    Diagnoses:   296.33 (F33.2) Major Depressive Disorder, Recurrent Episode, Severe _    Collateral Reports Completed:   TC Collateral: [facility] electronic medical records reviewed.    PLAN: (Patient Tasks / Therapist Tasks / Other)  Will not complete DA as this patient has a long term outpatient therapist she will be returning to at the end of the month.   Patient will follow safety plan and access crisis resources  if needed.     Patient will work on focusing on one hour at a time, journal (write essays) old memories both pleasant and painful, add herself to her calendar to create space for herself, use grounding techniques (see, hear, smell, ect)     Is this the patient's last discharge?: No, appt w/ long term provider is 10/30.  Will see patient next week for final appt w/ this provider    Procedure Code: Psychotherapy (with patient) - 45 [CPT 57396]    Magui Ribera Psy.D, CLINT                                                      October 18, 2024

## 2024-10-18 NOTE — PROGRESS NOTES
"[facility] Follow-up    Date of visit: 10/18/2024    Chief complaint:   Chief Complaint   Patient presents with    Pain     Follow-up for arthralgias, history of cervical radiculopathy.  Reviewing the record seen by orthopedics, dealing with different orthotics for her foot pain and some podiatry procedures.    Continues with electroconvulsive therapy program.    Was seen in the emergency room 9/25, reported  Interval history:    Walking some stairs falling back hitting her head, thought to be stable.    She is seen today with her , reports \"not good\".  She describes stressors managing all her medical appointments.  She did have a right calf.  Needs to have further procedures.  She is wanting to become stabilized so that she can have her shoulder surgeries with orthopedics.    Acknowledges frustration working with her feet getting the proper orthotics she needs.    ECT is now every other week.  They are trying to find a antidepressant that she can be stable on to extent.  She describes anxiety Viibryd which is helpful without had insomnia.  Her  comments that while on the Viibryd she seemed to manage her anger and much more there was some insomnia.    Acknowledged did fall down the stairs.  Describes bruising her ribs and notes since a history of radiation therapy to her left breast describes with the lymphatic damage to the breast is quite hard and continues to be painful.    She did lose 50 pounds making some changes in her diet trying to minimize the gout issues.    Demonstrates still can have some pain in her hands from the gout, her left third finger can have trigger point problems.     points out that as they took the cetyl myristoleate and some benefit with the joints and she acknowledges that.    Using the oxycodone 5 mg was usually taking 4 in a day, occasionally 5 a day since she had the fall down the stairs injuring more of her " "left ribs and breast area.    There were some questions she had about \"addiction\" with oxycodone, staff reviewed with her distinction between dependence and addiction and patient felt reassured.    Last urine drug test in August.   reviewed             Medications:  Current Outpatient Medications   Medication Sig Dispense Refill    acetaminophen (TYLENOL) 325 MG tablet Take 325-650 mg by mouth every 6 hours as needed for mild pain.      albuterol (VENTOLIN HFA) 108 (90 Base) MCG/ACT inhaler Inhale 2 puffs into the lungs every 6 hours as needed for shortness of breath, wheezing or cough 18 g 11    allopurinol (ZYLOPRIM) 300 MG tablet Take 1 tablet (300 mg) by mouth every morning (Patient taking differently: Take 300 mg by mouth as needed.)      bisacodyl (DULCOLAX) 5 MG EC tablet Two days prior to exam take two (2) tablets at 4pm. One day prior to exam take two (2) tablets at 4pm 4 tablet 0    Cyanocobalamin (VITAMIN B-12) 5000 MCG SUBL Place 2-3 sprays under the tongue every morning. Unknown dose. 2 or 3 sprays/day      dextromethorphan-buPROPion ER (AUVELITY)  MG ER tablet Take 1 tablet by mouth daily. (Patient taking differently: Take 1 tablet by mouth daily. Have not started yet) 30 tablet 0    ethacrynic acid (EDECRIN) 25 MG tablet Half pill every day (Patient taking differently: Take 0.5 tablets by mouth every morning. Half pill every day) 45 tablet 3    Fluticasone-Umeclidin-Vilanterol (TRELEGY ELLIPTA) 200-62.5-25 MCG/ACT oral inhaler Inhale 1 puff into the lungs daily. (Patient taking differently: Inhale 1 puff into the lungs every morning.) 60 each 11    gabapentin (NEURONTIN) 100 MG capsule Take 1 capsule (100 mg) by mouth every 6 hours as needed (anxiety) (Patient taking differently: Take 300 mg by mouth every 6 hours as needed (anxiety).) 120 capsule 1    gabapentin (NEURONTIN) 400 MG capsule Take 1 capsule (400 mg) by mouth at bedtime 30 capsule 1    guaiFENesin (MUCINEX) 600 MG 12 hr tablet " Take 600 mg by mouth every morning.      ketoconazole (NIZORAL) 2 % external shampoo       KLOR-CON M20 20 MEQ CR tablet Take 1 tablet (20 mEq) by mouth every morning. 90 tablet 3    MAGNESIUM PO Take 1 capsule by mouth 2 times daily Strength unknown      medical cannabis (Patient's own supply) See Admin Instructions (The purpose of this order is to document that the patient reports taking medical cannabis.  This is not a prescription, and is not used to certify that the patient has a qualifying medical condition.)  Flower      melatonin 3 MG tablet Take 1 tablet (3 mg) by mouth nightly as needed for sleep. 30 tablet 1    naloxone (NARCAN) 4 MG/0.1ML nasal spray Spray 1 spray (4 mg) into one nostril alternating nostrils as needed for opioid reversal every 2-3 minutes until assistance arrives 0.2 mL 0    omeprazole (PRILOSEC) 20 MG DR capsule Take 1 capsule (20 mg) by mouth every morning 90 capsule 3    oxyCODONE (ROXICODONE) 5 MG tablet Take 1 tablet (5 mg) by mouth 5 times daily. May dispense 10/27 for 10/29 70 tablet 0    polyethylene glycol (GOLYTELY) 236 g suspension Two days before procedure at 5PM fill first container with water. Mix and drink an 8 oz glass every 15 minutes until HALF of the container is gone. Place the remainder in the refrigerator. One day before procedure at 5PM drink second half of bowel prep. Drink an 8 oz glass every 15 minutes until it is gone. Day of procedure 6 hours before arrival time fill the 2nd container with water. Mix and drink an 8 oz glass every 15 minutes until HALF of the container is gone. Discard the remaining solution. 8000 mL 0    rOPINIRole (REQUIP) 2 MG tablet Take 1 tablet (2 mg) by mouth 3 times daily. One tab 3 pm, one bedtime, and one during night on waking 270 tablet 3    STATIN NOT PRESCRIBED (INTENTIONAL) Please choose reason not prescribed from choices below.      STATIN NOT PRESCRIBED (INTENTIONAL) Please choose reason not prescribed from choices below.       "SYNTHROID 150 MCG tablet Take 1 tablet (150 mcg) by mouth every morning MON to SAT 1 tablet/day; SUN 0.5 tablet      UNABLE TO FIND Take 1 tablet by mouth every morning. MEDICATION NAME: CETYL-MYRISFOLEATE      vitamin C (ASCORBIC ACID) 1000 MG TABS Take 1,000 mg by mouth every morning.      vitamin D3 (CHOLECALCIFEROL) 250 mcg (79535 units) capsule Take 1 capsule by mouth once a week. SUNDAY'S             Physical Exam:  Blood pressure 131/69, pulse 80, weight 75.8 kg (167 lb), SpO2 95%, not currently breastfeeding.  No pain behavior.    Ambulates with a cane.    PCP hands indeed appear edematous.    Affect, irritable, she acknowledges easily angry and overwhelmed with all her appointments, shows her calendar with all the highlighted appointments, and notes with the text reminders of the phone for appointments that is quite disruptive.  Alert, clear sensorium.  No respiratory distress,      Assessment: Orthopedics regarding her history of ankle surgery and pain trying to get proper orthotics, trying to have medical clearance for shoulder replacement.    Has found the oxycodone  Chronic pain includes arthralgias, history of cervical radiculopathy.    Presently with 5 mg 4-5 times a day useful.  She asked about extending the interval to monthly, I reminded her with my concerns about statements of suicidal ideation and overdose.  She acknowledges making those, ongoing frustration, and I will continue with the 2-week intervals for now while she continues with her mental health treatment.    She has found benefit with the supplement CMO  with joints.  Asked questions about cholesterol, I deferred to her cardiologist though noted  Use of the supplement \"C-15\" for which has been used for cholesterol stabilization also anti-inflammatory and her arthritis and she is interested in pursuing.    Total time 45 minutes         minutes spent on the date of encounter doing chart review, history, and exam documentation and further " activities as noted above.     Dusty Dubois MD  [facility]

## 2024-10-18 NOTE — PATIENT INSTRUCTIONS
"PLAN:    You are working with your cardiologist to have you stabilized for shoulder surgery.    You are working with the orthotics staff for  for shoes for your ankle.    Continue with the ECT service and your psychologist.    You may continue this cetyl myristoleate supplement.    Discussed the supplement \"C-15\" to help with inflammation, may obtain online through \"fatty 15\" 1 capsule twice a day.    Continue the oxycodone 5 mg up to 5 in a day.    Follow-up with Dr. Dubois for return visit in 3 months  "

## 2024-10-18 NOTE — PROGRESS NOTES
Patient presents to the clinic today for a visit with AXEL WIGGINS MD regarding Pain Management.          3/20/2024     7:54 AM 8/1/2024     8:47 AM 10/18/2024     8:47 AM   PEG Score   PEG Total Score 9.67 6.67 9.33       UDS/CSA- 08.01.2024    Medications- Oxycodone last taken this morning.     Notes worried that she is trending toward going into the hospital. She has been unable to get a handle her stressors    Lore[facility] Clinical Assistant

## 2024-10-21 ENCOUNTER — OFFICE VISIT (OUTPATIENT)
Dept: PSYCHIATRY | Facility: CLINIC | Age: 69
End: 2024-10-21
Payer: MEDICARE

## 2024-10-21 VITALS
HEART RATE: 92 BPM | SYSTOLIC BLOOD PRESSURE: 109 MMHG | WEIGHT: 167.6 LBS | DIASTOLIC BLOOD PRESSURE: 71 MMHG | BODY MASS INDEX: 27.05 KG/M2

## 2024-10-21 DIAGNOSIS — G47.00 PERSISTENT INSOMNIA: ICD-10-CM

## 2024-10-21 DIAGNOSIS — F33.2 SEVERE EPISODE OF RECURRENT MAJOR DEPRESSIVE DISORDER, WITHOUT PSYCHOTIC FEATURES (H): Primary | ICD-10-CM

## 2024-10-21 DIAGNOSIS — F43.10 PTSD (POST-TRAUMATIC STRESS DISORDER): ICD-10-CM

## 2024-10-21 PROCEDURE — 90833 PSYTX W PT W E/M 30 MIN: CPT

## 2024-10-21 PROCEDURE — G0463 HOSPITAL OUTPT CLINIC VISIT: HCPCS

## 2024-10-21 PROCEDURE — G2211 COMPLEX E/M VISIT ADD ON: HCPCS

## 2024-10-21 PROCEDURE — 99214 OFFICE O/P EST MOD 30 MIN: CPT

## 2024-10-21 RX ORDER — TRAZODONE HYDROCHLORIDE 50 MG/1
25-50 TABLET, FILM COATED ORAL
Status: SHIPPED
Start: 2024-10-21 | End: 2024-11-01

## 2024-10-21 RX ORDER — DEXTROMETHORPHAN HBR 15 MG/1
45 CAPSULE, LIQUID FILLED ORAL EVERY MORNING
Qty: 90 CAPSULE | Refills: 0 | Status: SHIPPED | OUTPATIENT
Start: 2024-10-21 | End: 2024-11-01

## 2024-10-21 RX ORDER — BUPROPION HYDROCHLORIDE 100 MG/1
100 TABLET, EXTENDED RELEASE ORAL EVERY MORNING
Qty: 30 TABLET | Refills: 0 | Status: SHIPPED | OUTPATIENT
Start: 2024-10-21 | End: 2024-11-01

## 2024-10-21 RX ORDER — GABAPENTIN 400 MG/1
400 CAPSULE ORAL AT BEDTIME
Qty: 30 CAPSULE | Refills: 1 | Status: SHIPPED | OUTPATIENT
Start: 2024-10-21 | End: 2024-11-01

## 2024-10-21 ASSESSMENT — PAIN SCALES - GENERAL: PAINLEVEL: EXTREME PAIN (8)
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
## 2024-10-21 NOTE — PATIENT INSTRUCTIONS
Medication Plan  -Continue gabapentin 400mg-700mg at bedtime and 100mg Q6H PRN for anxiety. Max daily dose: 800mg.   -Continue trazodone 50mg at bedtime PRN   -Start Dextromethorphan 45mg and bupropion SR 100mg every morning (to mimic Auvelity)  -Increase melatonin to 6mg nightly    **For crisis resources, please see the information at the end of this document**   Patient Education    Thank you for coming to the [facility] [place] [facility].     Lab Testing:  If you had lab testing today and your results are reassuring or normal they will be mailed to you or sent through [place] within 7 days. If the lab tests need quick action we will call you with the results. The phone number we will call with results is # [phone]. If this is not the best number please call our clinic and change the number.     Medication Refills:  If you need any refills please call your pharmacy and they will contact us. Our fax number for refills is [phone].   Three business days of notice are needed for general medication refill requests.   Five business days of notice are needed for controlled substance refill requests.   If you need to change to a different pharmacy, please contact the new pharmacy directly. The new pharmacy will help you get your medications transferred.     Contact Us:  Please call [phone] during business hours (8-5:00 M-F).   If you have medication related questions after clinic hours, or on the weekend, please call [phone].     Financial Assistance [phone]   Medical Records [phone]       MENTAL HEALTH CRISIS RESOURCES:  For a emergency help, please call 911 or go to the nearest Emergency Department.     Emergency Walk-In Options:   EmPATH Unit @ [facility] ([place]): [phone] - Specialized mental health emergency area designed to be calming  [facility] ([place]): [phone]  [facility] ([place]):  [phone]  [facility] ([place]): [phone]    [facility] Crisis Information:   [place]: [phone]  [place]: [phone]  [place]) - Adult: [phone]     Child: [phone]  [place] - Adult: [phone]     Child: [phone]  [place]: [phone]  List of all [facility] resources:   https://[place].[facility]/dhs/people-we-serve/adults/health-care/mental-health/resources/crisis-contacts.jsp    National Crisis Information:   Crisis Text Line: Text  [place]  to 730688  Suicide & Crisis Lifeline: 988  National Suicide Prevention Lifeline: 1-638-710-TALK (1-724.976.7803)       For online chat options, visit https://suicidepreventionlifeline.org/chat/  Poison Control Center: 1-998.165.9842  Trans Lifeline: 7-377-080-5075 - Hotline for transgender people of all ages  [employer]: 8-996-311-9785 - Hotline for LGBT youth     For Non-Emergency Support:   Fast Tracker: Mental Health & Substance Use Disorder Resources -   https://www.[employer].org/

## 2024-10-22 ENCOUNTER — TRANSFERRED RECORDS (OUTPATIENT)
Dept: HEALTH INFORMATION MANAGEMENT | Facility: CLINIC | Age: 69
End: 2024-10-22

## 2024-10-22 ENCOUNTER — OFFICE VISIT (OUTPATIENT)
Dept: PSYCHIATRY | Facility: CLINIC | Age: 69
End: 2024-10-22
Payer: MEDICARE

## 2024-10-22 DIAGNOSIS — F33.2 SEVERE EPISODE OF RECURRENT MAJOR DEPRESSIVE DISORDER, WITHOUT PSYCHOTIC FEATURES (H): Primary | ICD-10-CM

## 2024-10-22 DIAGNOSIS — F41.1 GENERALIZED ANXIETY DISORDER: ICD-10-CM

## 2024-10-22 ASSESSMENT — ANXIETY QUESTIONNAIRES
6. BECOMING EASILY ANNOYED OR IRRITABLE: MORE THAN HALF THE DAYS
8. IF YOU CHECKED OFF ANY PROBLEMS, HOW DIFFICULT HAVE THESE MADE IT FOR YOU TO DO YOUR WORK, TAKE CARE OF THINGS AT HOME, OR GET ALONG WITH OTHER PEOPLE?: VERY DIFFICULT
GAD7 TOTAL SCORE: 11
7. FEELING AFRAID AS IF SOMETHING AWFUL MIGHT HAPPEN: SEVERAL DAYS
GAD7 TOTAL SCORE: 11
7. FEELING AFRAID AS IF SOMETHING AWFUL MIGHT HAPPEN: SEVERAL DAYS
7. FEELING AFRAID AS IF SOMETHING AWFUL MIGHT HAPPEN: SEVERAL DAYS
4. TROUBLE RELAXING: MORE THAN HALF THE DAYS
GAD7 TOTAL SCORE: 11
1. FEELING NERVOUS, ANXIOUS, OR ON EDGE: MORE THAN HALF THE DAYS
8. IF YOU CHECKED OFF ANY PROBLEMS, HOW DIFFICULT HAVE THESE MADE IT FOR YOU TO DO YOUR WORK, TAKE CARE OF THINGS AT HOME, OR GET ALONG WITH OTHER PEOPLE?: VERY DIFFICULT
4. TROUBLE RELAXING: MORE THAN HALF THE DAYS
GAD7 TOTAL SCORE: 11
1. FEELING NERVOUS, ANXIOUS, OR ON EDGE: MORE THAN HALF THE DAYS
6. BECOMING EASILY ANNOYED OR IRRITABLE: MORE THAN HALF THE DAYS
IF YOU CHECKED OFF ANY PROBLEMS ON THIS QUESTIONNAIRE, HOW DIFFICULT HAVE THESE PROBLEMS MADE IT FOR YOU TO DO YOUR WORK, TAKE CARE OF THINGS AT HOME, OR GET ALONG WITH OTHER PEOPLE: VERY DIFFICULT
5. BEING SO RESTLESS THAT IT IS HARD TO SIT STILL: SEVERAL DAYS
3. WORRYING TOO MUCH ABOUT DIFFERENT THINGS: SEVERAL DAYS
GAD7 TOTAL SCORE: 11
2. NOT BEING ABLE TO STOP OR CONTROL WORRYING: MORE THAN HALF THE DAYS
2. NOT BEING ABLE TO STOP OR CONTROL WORRYING: MORE THAN HALF THE DAYS
GAD7 TOTAL SCORE: 11
5. BEING SO RESTLESS THAT IT IS HARD TO SIT STILL: SEVERAL DAYS
IF YOU CHECKED OFF ANY PROBLEMS ON THIS QUESTIONNAIRE, HOW DIFFICULT HAVE THESE PROBLEMS MADE IT FOR YOU TO DO YOUR WORK, TAKE CARE OF THINGS AT HOME, OR GET ALONG WITH OTHER PEOPLE: VERY DIFFICULT
3. WORRYING TOO MUCH ABOUT DIFFERENT THINGS: SEVERAL DAYS
7. FEELING AFRAID AS IF SOMETHING AWFUL MIGHT HAPPEN: SEVERAL DAYS

## 2024-10-23 ENCOUNTER — TELEPHONE (OUTPATIENT)
Dept: CARDIOLOGY | Facility: CLINIC | Age: 69
End: 2024-10-23
Payer: MEDICARE

## 2024-10-23 ENCOUNTER — TELEPHONE (OUTPATIENT)
Dept: GASTROENTEROLOGY | Facility: CLINIC | Age: 69
End: 2024-10-23
Payer: MEDICARE

## 2024-10-23 NOTE — TELEPHONE ENCOUNTER
Returned pt call and LVM per pt's request.  Requested a returned call and to let me know how I can assist her when she leaves a message.    Miki Richardson RN on 10/23/2024 at 4:43 PM

## 2024-10-23 NOTE — TELEPHONE ENCOUNTER
Caller:     Reason for Reschedule/Cancellation   (please be detailed, any staff messages or encounters to note?): HAVING SURGERY      Prior to reschedule please review:  Ordering Provider:     GURU MARGARITA NOLAND     Sedation Determined: MAC  Does patient have any ASC Exclusions, please identify?:       Notes on Cancelled Procedure:  Procedure: Lower Endoscopy [Colonoscopy]   Date: 11/5  Location: [facility]; [address], [address], [place] [address]   Surgeon:       Rescheduled: No, WILL CALL BACK WANTS TO DO IT IN APRIL       Did you cancel or rescheduled an EUS procedure? No.

## 2024-10-23 NOTE — TELEPHONE ENCOUNTER
Patient called in asking if it is ok to post pone her procedure due to a lot of health concerns at this time. Writer sent message to Dr. Tolbert to confirm if it is ok to post pone. Dr. Tolbert confirmed and is ok with that at this time. LVM for patient to relay that information.     If patient calls back please confirm that she wants to cancel at this time.

## 2024-10-24 ENCOUNTER — TELEPHONE (OUTPATIENT)
Dept: INTERNAL MEDICINE | Facility: CLINIC | Age: 69
End: 2024-10-24

## 2024-10-24 ENCOUNTER — TELEPHONE (OUTPATIENT)
Dept: BEHAVIORAL HEALTH | Facility: CLINIC | Age: 69
End: 2024-10-24
Payer: MEDICARE

## 2024-10-24 ENCOUNTER — VIRTUAL VISIT (OUTPATIENT)
Dept: BEHAVIORAL HEALTH | Facility: CLINIC | Age: 69
End: 2024-10-24
Payer: MEDICARE

## 2024-10-24 ENCOUNTER — HOSPITAL ENCOUNTER (OUTPATIENT)
Facility: CLINIC | Age: 69
End: 2024-10-24
Attending: ORTHOPAEDIC SURGERY | Admitting: ORTHOPAEDIC SURGERY
Payer: MEDICARE

## 2024-10-24 ENCOUNTER — VIRTUAL VISIT (OUTPATIENT)
Dept: CARDIOLOGY | Facility: CLINIC | Age: 69
End: 2024-10-24
Attending: PHYSICIAN ASSISTANT
Payer: MEDICARE

## 2024-10-24 DIAGNOSIS — F33.1 MAJOR DEPRESSIVE DISORDER, RECURRENT EPISODE, MODERATE (H): Primary | ICD-10-CM

## 2024-10-24 DIAGNOSIS — F11.20 OPIOID TYPE DEPENDENCE, CONTINUOUS (H): Primary | ICD-10-CM

## 2024-10-24 DIAGNOSIS — R06.02 SOB (SHORTNESS OF BREATH): Primary | ICD-10-CM

## 2024-10-24 DIAGNOSIS — F41.1 GENERALIZED ANXIETY DISORDER: ICD-10-CM

## 2024-10-24 PROCEDURE — 99207 PR NO CHARGE LOS: CPT | Mod: 95 | Performed by: SOCIAL WORKER

## 2024-10-24 ASSESSMENT — COLUMBIA-SUICIDE SEVERITY RATING SCALE - C-SSRS
1. SINCE LAST CONTACT, HAVE YOU WISHED YOU WERE DEAD OR WISHED YOU COULD GO TO SLEEP AND NOT WAKE UP?: YES
2. HAVE YOU ACTUALLY HAD ANY THOUGHTS OF KILLING YOURSELF?: NO

## 2024-10-24 NOTE — LETTER
10/24/2024      RE: Randi Cleary  [address]  [place] [address]       Dear Colleague,    Thank you for the opportunity to participate in the care of your patient, Randi Cleary, at the [facility] [place] [facility]. Please see a copy of my visit note below.          CARDIOLOGY PH CLINIC VIDEO VISIT    Date of video visit: 10/24/24    Attempted to do a video visit with patient today, to follow up on recent hemodynamic testing.  Unfortunately she was very upset about the need for a visit today, and the amount of stress it caused her to schedule it, etc.  She officially requested to cancel the visit, which I told her we would do.    I did briefly tell her that her recent RHC was normal, which she tells me she already knew. She tells me she does not want any further testing or appointments at this time.She tells me she is feeling well in regard to breathing and has not current issues.    We will see her back on on a PRN basis. I asked her to call our office with any new concerns or questions.    RHC 9/25/24.  Hemodynamics    RA 2/3/2  RV 20/2  PA 20/10/13  PCWP 8/9/7  PA% 70  Measured Jeannie CO/CI 5.56/2.3  Thermodilution CO/CI 4.3/2.3  PVR 1.08 QUIROZ Right sided filling pressures are normal. Left sided filling pressures are normal. Normal PA pressures. Normal cardiac output level.       Arlyn Stern PA-C  [facility] Cardiology--Pulmonary Hypertension Clinic       Please do not hesitate to contact me if you have any questions/concerns.     Sincerely,     VERONICA Etienne

## 2024-10-24 NOTE — PROGRESS NOTES
CARDIOLOGY PH CLINIC VIDEO VISIT    Date of video visit: 10/24/24    Attempted to do a video visit with patient today, to follow up on recent hemodynamic testing.  Unfortunately she was very upset about the need for a visit today, and the amount of stress it caused her to schedule it, etc.  She officially requested to cancel the visit, which I told her we would do.    I did briefly tell her that her recent RHC was normal, which she tells me she already knew. She tells me she does not want any further testing or appointments at this time.She tells me she is feeling well in regard to breathing and has not current issues.    We will see her back on on a PRN basis. I asked her to call our office with any new concerns or questions.    RHC 9/25/24.  Hemodynamics    RA 2/3/2  RV 20/2  PA 20/10/13  PCWP 8/9/7  PA% 70  Measured Jeannie CO/CI 5.56/2.3  Thermodilution CO/CI 4.3/2.3  PVR 1.08 QUIROZ Right sided filling pressures are normal. Left sided filling pressures are normal. Normal PA pressures. Normal cardiac output level.       Arlyn Stern PA-C  [facility] Cardiology--Pulmonary Hypertension Clinic

## 2024-10-24 NOTE — TELEPHONE ENCOUNTER
Contacted patient as provider out unexpectedly. Patient stated hate to end that way, Yissel has been so wonderful. Said that Yevgeniy and Yissel were very wonderful. Wanted to thank TC staff especially Yissel as she has been great. Has next LOC appt next week, so do not need to reschedule.     Cassidy Baker  Transition Clinic Coordinator  10/24/24 8:47 AM

## 2024-10-24 NOTE — TELEPHONE ENCOUNTER
----- Message from Archana Blanton sent at 10/24/2024 12:36 PM CDT -----  Regarding: Case Management  Good Afternoon,  This patient requested a crisis MH apt with [facility] today. She was/is overwhelmed managing her health care.  She has many health issued and many providers who either can't connect or one providers intervention is dependant on or interferes with another providers intervention. 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 She has 1 supportive friend, no family and her spouse is emotionally preoccupied or distant.    This patient would benefit from a case manage or nurse care manager. Is that something you could write an order for?  Im open to explore other ideas and services.    Archana Blanton, [facility]  Transition Clinic  [phone] Clinic  [phone]  Cell

## 2024-10-24 NOTE — PATIENT INSTRUCTIONS
You were seen today in the Pulmonary Hypertension Clinic at the [facility].     Cardiology Provider you saw during your visit:    VERONICA Etienne    Medication Changes:  No med changes today.    Recommendations:   Pulmonary Hypertension as needed only.    Follow-up:   Only see us as needed.    Please call us immediately if you have any syncope (fainting or passing out), chest pain, edema (swelling or weight gain), or decline in your functional status (general decline in how you are feeling).    If you have emergent concerns after hours or on the weekend, please call our on-call Cardiologist at [phone], option 4. For emergencies call [phone].     Thank you for allowing us to be a part of your care here at the [facility]    If you have questions or concerns please contact us at:    Miki Richardson RN (P: [phone])    Nurse Coordinator       Pulmonary Hypertension     [facility]         JON Olvera   (Prior Auths & Pt Assistance)   ()  Clinic   Clinic   Pulmonary Hypertension   Pulmonary Hypertension  [facility]  (P)[phone]    (P) [phone]  (F) [phone]

## 2024-10-24 NOTE — TELEPHONE ENCOUNTER
Writer spoke with patient and provided crisis line phone number. Writer will give patient a call to schedule if no show or cancellation as requested for same day visit.    Jacqueline Givens  10/24/2024  [phone]

## 2024-10-24 NOTE — TELEPHONE ENCOUNTER
Writer spoke with patient and scheduled appointment for same day 10/24/2024 @ 10:30 am.    Jacqueline Givens  10/24/2024  [phone]

## 2024-10-24 NOTE — PROGRESS NOTES
Transition [facility] - Initial Visit Progress Note    Patient  Name: Randi Cleary, : 1955    Date:  10/24/2024       Service Type:  Mental Health Initial Visit     VISIT TIME START: 1100  VISIT TIME END: 1203     Session Length:   TC Session Length: 60 (53+ Minutes)    Visit #: 1    Attendees:  TC Attendees: Client alone    Service Modality:  Service Modality: Video Visit:      Provider verified identity through the following two step process.  Patient provided:  Patient  and Patient is known previously to provider    Telemedicine Visit: The patient's condition can be safely assessed and treated via synchronous audio and visual telemedicine encounter.      Reason for Telemedicine Visit: Patient has requested telehealth visit    Originating Site (Patient Location): Patient's home    Distant Site (Provider Location): [facility]    Consent:  The patient/guardian has verbally consented to: the potential risks and benefits of telemedicine (video visit) versus in person care; bill my insurance or make self-payment for services provided; and responsibility for payment of non-covered services.     Patient would like the video invitation sent by:  My Chart    Mode of Communication:  Video Conference via [facility]    Distant Location (Provider):  On-site    As the provider I attest to compliance with applicable laws and regulations related to telemedicine.    Source of Referral:  Other      Informed Consent and Assessment Methods    This provider and patient discussed HIPAA, and the limits of confidentiality; including mandated reporting, the possibility of collaborative discussions with patient's primary care provider and the multidisciplinary team in the MH clinic during consultation.  Discussed the no show policy, and the benefits and possible unintended consequences of therapy.  Patient indicated understanding Transition Clinic services are short term,  solution focused, until a referral can be made and patient can bridge to long term therapy.  Patient verbally indicated understanding the informed consent.         Presenting Concerns/  Current Stressors:  Randi Cleary is a 69 year old identified Female who self referred to the Transition Clinic feeling in crisis over medical care management.      Randi Cleary reports feeling overwhelmed managing her health care. She is tearful, holding her head in her hands and unable to verbally stay on one aspect of an issue. She went back and forth between [place] concerns, procedures and providers.  She has many health issues and many providers who either can't connect with one another or one providers intervention is dependant on or interferes with another providers intervention. 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 She denied SI. Patient reminded of her safety plan. She states she is aware. She has 1 supportive friend, no family and her spouse is emotionally preoccupied or distant. She does not qualify for [facility] or [facility] Case Management. Her insurance is medicare with a [employer] supplement who does not provide medical case management. Discussed benefits from a case manage or nurse care manager. Patient agreed to return Monday.  Patient will see [facility] therapist Erika Colorado 10/30/2024.  No score changes on PROMIS.    ASSESSMENT:    The following assessments were completed by patient for this visit:  PHQ9:       9/24/2024     9:53 AM 9/26/2024    11:52 AM 10/1/2024    11:58 AM 10/8/2024     6:24 AM 10/20/2024     1:55 PM 10/22/2024     8:57 AM 10/24/2024    10:46 AM   PHQ-9 SCORE   PHQ-9 Total Score MyChart 12 (Moderate depression) 15 (Moderately severe depression) 14 (Moderate depression) 11 (Moderate depression) 13 (Moderate depression) 14 (Moderate depression) 13 (Moderate depression)   PHQ-9 Total Score 12 15 14 11 13 14 13        Patient-reported  "    GAD7:       6/27/2024    12:48 PM 7/1/2024    12:10 PM 7/17/2024     9:55 AM 9/9/2024     1:33 PM 9/24/2024     9:54 AM 10/8/2024     6:29 AM 10/22/2024     8:59 AM   CHAVO-7 SCORE   Total Score 11 (moderate anxiety)  10 (moderate anxiety) 13 (moderate anxiety) 9 (mild anxiety) 9 (mild anxiety) 11 (moderate anxiety)   Total Score 11    11    11    11    11 11 10 13 9 9    9 11    11     CAGE-AID:       6/22/2020     7:12 PM 1/18/2022    11:15 PM 6/6/2024     8:00 AM   CAGE-AID Total Score   Total Score 4 4 4   Total Score MyChart 4 (A total score of 2 or greater is considered clinically significant) 4 (A total score of 2 or greater is considered clinically significant)      PROMIS 10-Global Health (only subscores and total score):       6/6/2024     8:00 AM 7/1/2024    12:10 PM 7/10/2024     9:53 AM 7/17/2024    10:01 AM 10/1/2024    12:02 PM 10/17/2024    12:54 PM 10/24/2024     1:00 PM   PROMIS-10 Scores Only   Global Mental Health Score 8 8 9    9    9    9    9    9 6 6 6 6   Global Physical Health Score 8 11 11    11    11    11    11    11 9 9 9 9   PROMIS TOTAL - SUBSCORES 16 19 20    20    20    20    20    20 15 15 15 15     [place] Suicide Severity Rating Scale (Lifetime/Recent)      5/5/2020     9:21 AM 6/11/2020    10:00 AM 1/9/2023     1:00 PM 5/21/2024     4:49 PM 5/21/2024     9:00 PM 6/6/2024     8:00 AM 7/10/2024    12:00 PM   [place] Suicide Severity Rating (Lifetime/Recent)   Q1 Wish to be Dead (Lifetime) Yes Yes   Yes     Comments \"When I was going through a couple of  years of counseling from a dysfunctional family about 30 years ago or more\" when patient was in treatment and there were family concerns   OD on medications     Q2 Non-Specific Active Suicidal Thoughts (Lifetime) Yes Yes   No     Non-Specific Active Suicidal Thought Description (Lifetime) Had thoughts of killing self         Most Severe Ideation Rating (Lifetime) 5 5   5     Most Severe Ideation Description (Lifetime) 35 years " "ago \"I just wanted to check out , I had thought of it so much and wanted to be done\" when family problems were happening   OD on medication     Frequency (Lifetime) 4 --   3     Duration (Lifetime) 2 --   3     Controllability (Lifetime) 3 0   2     Protective Factors  (Lifetime) 2 5   4     Reasons for Ideation (Lifetime) 5 --   5     Q1 Wished to be Dead (Past Month)   yes 1-->yes 1-->yes 1-->yes    Q2 Suicidal Thoughts (Past Month)   no 1-->yes 1-->yes 1-->yes    Q3 Suicidal Thought Method   no 0-->no 0-->no 1-->yes    Q4 Suicidal Intent without Specific Plan   no 1-->yes 0-->no 0-->no    Q5 Suicide Intent with Specific Plan   no 0-->no 0-->no 1-->yes    Q6 Suicide Behavior (Lifetime)   yes 1-->yes 0-->no 1-->yes    If yes to Q6, within past 3 months?   no 1-->yes 0-->no 0-->no    Level of Risk per Screen   moderate risk high risk moderate risk high risk    RETIRED: 1. Wish to be Dead (Recent) No No        RETIRED: 2. Non-Specific Active Suicidal Thoughts (Recent) No No        3. Active Suicidal Ideation with any Methods (Not Plan) Without Intent to Act (Lifetime) Yes Yes        RETIRE: Active Suicidal Ideation with any Methods (Not Plan) Description (Lifetime) Took many pills of Prozac and was sent to the ED 30 years prior        RETIRED: 3. Active Suicidal Ideation with any Methods (Not Plan) Without Intent to Act (Recent) No         RETIRE: 4. Active Suicidal Ideation with Some Intent to Act, Without Specific Plan (Lifetime) Yes Yes        RETIRE: Active Suicidal Ideation with Some Intent to Act, Without Specific Plan Description (Lifetime) Took many pills of Prozac and was sent to the ED         4. Active Suicidal Ideation with Some Intent to Act, Without Specific Plan (Recent) No         RETIRE: 5. Active Suicidal Ideation with Specific Plan and Intent (Lifetime) Yes Yes        RETIRE: Active Suicidal Ideation with Specific Plan and Intent Description (Lifetime) Took many pills of Prozac and was sent to the " ED over 30 years ago         RETIRED: 5. Active Suicidal Ideation with Specific Plan and Intent (Recent) No         Most Severe Ideation Rating (Past Month) NA         Frequency (Past Month) NA         Duration (Past Month) NA         Controllability (Past Month) NA         Protective Factors (Past Month) NA         Reasons for Ideation (Past Month) NA         Actual Attempt (Lifetime) Yes Yes        Actual Attempt Description (Lifetime) Took a bunch of pills patient reported overdosing on Prozac about thirty years ago        Total Number of Actual Attempts (Lifetime) 1 1        Actual Attempt (Past 3 Months) No No        Has subject engaged in non-suicidal self-injurious behavior? (Lifetime) Yes Yes        Has subject engaged in non-suicidal self-injurious behavior? (Past 3 Months) No No        Interrupted Attempts (Lifetime) No No        Interrupted Attempts (Past 3 Months) No No        Aborted or Self-Interrupted Attempt (Lifetime) No No        Total Number Aborted or Self Interrupted Attempts (Lifetime) 0         Aborted or Self-Interrupted Attempt (Past 3 Months) No No        Preparatory Acts or Behavior (Lifetime) No No        Preparatory Acts or Behavior (Past 3 Months) No No        Most Recent Attempt Date 10/1/1984 --        Comments  30 years prior        Most Recent Attempt Actual Lethality Code 2 0        Comments This is a guess/pt. doesn't recall exact month or day         Most Lethal Attempt Actual Lethality Code 2         Comments only 1 attempt/same attempt as most recent & initial         Initial/First Attempt Date 10/1/1984         Initial/First Attempt Actual Lethality Code 2         Q1 Wish to be Dead (Lifetime)       N   Q2 Non-Specific Active Suicidal Thoughts (Lifetime)       N     [place] Suicide Severity Rating Scale (Short Version)      5/21/2024     4:49 PM 5/21/2024     9:00 PM 6/6/2024     8:00 AM 7/1/2024    12:00 PM 8/7/2024     3:00 PM 10/13/2024     4:00 PM 10/24/2024     1:00 PM    [place] Suicide Severity Rating (Short Version)   Q1 Wished to be Dead (Past Month) 1-->yes 1-->yes 1-->yes       Q2 Suicidal Thoughts (Past Month) 1-->yes 1-->yes 1-->yes       Q3 Suicidal Thought Method 0-->no 0-->no 1-->yes       Q4 Suicidal Intent without Specific Plan 1-->yes 0-->no 0-->no       Q5 Suicide Intent with Specific Plan 0-->no 0-->no 1-->yes       Q6 Suicide Behavior (Lifetime) 1-->yes 0-->no 1-->yes       If yes to Q6, within past 3 months? 1-->yes 0-->no 0-->no       Level of Risk per Screen high risk moderate risk high risk       1. Wish to be Dead (Since Last Contact)    Y Y Y Y   Wish to be Dead Description (Since Last Contact)     tired of living for medical appts overwhelmed with medical issues Overwhelmed with health care needs.   2. Non-Specific Active Suicidal Thoughts (Since Last Contact)    Y N N N   Actual Attempt (Since Last Contact)     N     Has subject engaged in non-suicidal self-injurious behavior? (Since Last Contact)     N     Interrupted Attempts (Since Last Contact)     N     Aborted or Self-Interrupted Attempt (Since Last Contact)     N     Preparatory Acts or Behavior (Since Last Contact)     N     Suicide (Since Last Contact)     N     Calculated C-SSRS Risk Score (Since Last Contact)    Low Risk Low Risk Low Risk Low Risk         Mental Status Assessment:  Appearance:   Appropriate   Eye Contact:   Good   Psychomotor Behavior: Agitated   Attitude:   Cooperative   Orientation:   Person Place Time Situation  Speech     Rate / Production:  Emotional     Volume:   Normal   Mood:    Anxious  Depressed  Agitated  Affect:    Tearful  Thought Content:  Clear   Thought Form:  Coherent   Insight:    Fair       Safety Issues and Plan for Safety and Risk Management:     Patient denies current fears or concerns for personal safety.  Patient denies current or recent suicidal ideation or behaviors.  Patient denies current or recent homicidal ideation or behaviors.  Patient denies  current or recent self injurious behavior or ideation.  Patient denies other safety concerns.  A safety and risk management plan has been developed including: Patient consented to co-developed safety plan on 6/4/2024.  Safety and risk management plan was reviewed.   Patient agreed to use safety plan should any safety concerns arise.  A copy was made available to the patient.  Patient reports there are no firearms in the house.     Diagnostic Criteria:  Generalized Anxiety Disorder  A. Excessive anxiety and worry about a number of events or activities (such as work or school performance).   B. The person finds it difficult to control the worry.  C. Select 3 or more symptoms (required for diagnosis). Only one item is required in children.   - Restlessness or feeling keyed up or on edge.    - Being easily fatigued.    - Difficulty concentrating or mind going blank.    - Irritability.    - Muscle tension.    - Sleep disturbance (difficulty falling or staying asleep, or restless unsatisfying sleep).   D. The focus of the anxiety and worry is not confined to features of an Axis I disorder.  E. The anxiety, worry, or physical symptoms cause clinically significant distress or impairment in social, occupational, or other important areas of functioning.   F. The disturbance is not due to the direct physiological effects of a substance (e.g., a drug of abuse, a medication) or a general medical condition (e.g., hyperthyroidism) and does not occur exclusively during a Mood Disorder, a Psychotic Disorder, or a Pervasive Developmental Disorder.  Major Depressive Disorder  CRITERIA (A-C) REPRESENT A MAJOR DEPRESSIVE EPISODE - SELECT THESE CRITERIA  A) Recurrent episode(s) - symptoms have been present during the same 2-week period and represent a change from previous functioning 5 or more symptoms (required for diagnosis)   - Depressed mood. Note: In children and adolescents, can be irritable mood.     - Diminished interest or  pleasure in all, or almost all, activities.    - Psychomotor activity agitation.    - Fatigue or loss of energy.    - Feelings of worthlessness or inappropriate guilt.    - Diminished ability to think or concentrate, or indecisiveness.    - Recurrent thoughts of death (not just fear of dying), recurrent suicidal ideation without a specific plan, or a suicide attempt or a specific plan for committing suicide.   B) The symptoms cause clinically significant distress or impairment in social, occupational, or other important areas of functioning  C) The episode is not attributable to the physiological effects of a substance or to another medical condition  D) The occurence of major depressive episode is not better explained by other thought / psychotic disorders  E) There has never been a manic episode or hypomanic episode    DSM5 Diagnoses: (Sustained by DSM5 Criteria Listed Above)  Diagnoses: 296.33 (F33.2) Major Depressive Disorder, Recurrent Episode, Severe _  300.02 (F41.1) Generalized Anxiety Disorder    Psychosocial & Contextual Factors: Multible medical diagnoses and treatment needs.  History of substance abuse. Current emotional risk of relapse. No family for support. Conflict in her marriage.      Intervention:   Solution Focused Brief Therapy:    Solution-Focused Brief Therapy (SFBT) - Change is perpetual, focus on the problematic excerptions. Reality is subjective and social constructed through conversation.    Collateral Reports Completed:   Collateral: [facility] [place] electronic medical records reviewed. and No Collateral obtained.    DATA:  Interactive Complexity: No  Crisis: No    PLAN: (Homework, other):  Provider will continue Diagnostic Assessment. Initial Treatment will focus on: Depressed Mood - tearful, overwhelmed .  2.   Provider recommended the following referrals: none.    3.   Information in this assessment was obtained from the medical record and provided by patient who is a good historian.    4.   Follow up scheduled:  10/28/2024        Patient was given the following to do until next session:  contact primary, request order for RN , Provider to message provider re: RN case manager. Call Insurance with patient next session on 10/28/2024  5.  Anticipated Discharge: Anticipated Discharge Time: < 1 Month   6. Is this the patient's last discharge: No      Procedure Code:  Psychotherapy (with patient) - 60 [CPT 86446]      Suicide Risk and Safety Concerns were assessed for. Patient meets the following risk assessment and triage: Patient has no change in safety concerns. Committed to safety and agreed to follow previously developed safety plan.    Archana Blanton, [facility]     10/24/2024    Answers submitted by the patient for this visit:  Patient Health Questionnaire (Submitted on 10/24/2024)  If you checked off any problems, how difficult have these problems made it for you to do your work, take care of things at home, or get along with other people?: Very difficult  PHQ9 TOTAL SCORE: 13

## 2024-10-25 ENCOUNTER — ANESTHESIA EVENT (OUTPATIENT)
Dept: BEHAVIORAL HEALTH | Facility: CLINIC | Age: 69
End: 2024-10-25
Payer: MEDICARE

## 2024-10-25 ENCOUNTER — HOSPITAL ENCOUNTER (OUTPATIENT)
Dept: BEHAVIORAL HEALTH | Facility: CLINIC | Age: 69
Discharge: HOME | End: 2024-10-25
Attending: STUDENT IN AN ORGANIZED HEALTH CARE EDUCATION/TRAINING PROGRAM
Payer: MEDICARE

## 2024-10-25 ENCOUNTER — TELEPHONE (OUTPATIENT)
Dept: BEHAVIORAL HEALTH | Facility: CLINIC | Age: 69
End: 2024-10-25
Payer: MEDICARE

## 2024-10-25 ENCOUNTER — ANESTHESIA (OUTPATIENT)
Dept: BEHAVIORAL HEALTH | Facility: CLINIC | Age: 69
End: 2024-10-25
Payer: MEDICARE

## 2024-10-25 VITALS
RESPIRATION RATE: 19 BRPM | OXYGEN SATURATION: 94 % | HEART RATE: 75 BPM | DIASTOLIC BLOOD PRESSURE: 65 MMHG | SYSTOLIC BLOOD PRESSURE: 108 MMHG | TEMPERATURE: 97.5 F

## 2024-10-25 DIAGNOSIS — F33.2 SEVERE RECURRENT MAJOR DEPRESSION WITHOUT PSYCHOTIC FEATURES (H): Primary | ICD-10-CM

## 2024-10-25 PROCEDURE — 250N000011 HC RX IP 250 OP 636: Performed by: ANESTHESIOLOGY

## 2024-10-25 PROCEDURE — 370N000017 HC ANESTHESIA TECHNICAL FEE, PER MIN: Performed by: ANESTHESIOLOGY

## 2024-10-25 PROCEDURE — 90870 ELECTROCONVULSIVE THERAPY: CPT

## 2024-10-25 PROCEDURE — 90870 ELECTROCONVULSIVE THERAPY: CPT | Performed by: ANESTHESIOLOGY

## 2024-10-25 PROCEDURE — 99100 ANES PT EXTEME AGE<1 YR&>70: CPT | Performed by: ANESTHESIOLOGY

## 2024-10-25 PROCEDURE — 250N000011 HC RX IP 250 OP 636: Performed by: PSYCHIATRY & NEUROLOGY

## 2024-10-25 PROCEDURE — 250N000009 HC RX 250: Performed by: ANESTHESIOLOGY

## 2024-10-25 PROCEDURE — 90870 ELECTROCONVULSIVE THERAPY: CPT | Performed by: STUDENT IN AN ORGANIZED HEALTH CARE EDUCATION/TRAINING PROGRAM

## 2024-10-25 PROCEDURE — 99100 ANES PT EXTEME AGE<1 YR&>70: CPT

## 2024-10-25 RX ORDER — LABETALOL HYDROCHLORIDE 5 MG/ML
INJECTION, SOLUTION INTRAVENOUS PRN
Status: DISCONTINUED | OUTPATIENT
Start: 2024-10-25 | End: 2024-10-25

## 2024-10-25 RX ORDER — SODIUM CHLORIDE, SODIUM LACTATE, POTASSIUM CHLORIDE, CALCIUM CHLORIDE 600; 310; 30; 20 MG/100ML; MG/100ML; MG/100ML; MG/100ML
INJECTION, SOLUTION INTRAVENOUS CONTINUOUS
Status: DISCONTINUED | OUTPATIENT
Start: 2024-10-25 | End: 2024-10-26 | Stop reason: HOSPADM

## 2024-10-25 RX ORDER — NALOXONE HYDROCHLORIDE 0.4 MG/ML
0.1 INJECTION, SOLUTION INTRAMUSCULAR; INTRAVENOUS; SUBCUTANEOUS
Status: DISCONTINUED | OUTPATIENT
Start: 2024-10-25 | End: 2024-10-26 | Stop reason: HOSPADM

## 2024-10-25 RX ORDER — DEXAMETHASONE SODIUM PHOSPHATE 4 MG/ML
4 INJECTION, SOLUTION INTRA-ARTICULAR; INTRALESIONAL; INTRAMUSCULAR; INTRAVENOUS; SOFT TISSUE
Status: DISCONTINUED | OUTPATIENT
Start: 2024-10-25 | End: 2024-10-26 | Stop reason: HOSPADM

## 2024-10-25 RX ORDER — HYDROMORPHONE HCL IN WATER/PF 6 MG/30 ML
0.2 PATIENT CONTROLLED ANALGESIA SYRINGE INTRAVENOUS EVERY 5 MIN PRN
Status: DISCONTINUED | OUTPATIENT
Start: 2024-10-25 | End: 2024-10-26 | Stop reason: HOSPADM

## 2024-10-25 RX ORDER — KETOROLAC TROMETHAMINE 30 MG/ML
30 INJECTION, SOLUTION INTRAMUSCULAR; INTRAVENOUS ONCE
Status: CANCELLED
Start: 2024-10-25 | End: 2024-10-25

## 2024-10-25 RX ORDER — ONDANSETRON 4 MG/1
4 TABLET, ORALLY DISINTEGRATING ORAL EVERY 30 MIN PRN
Status: DISCONTINUED | OUTPATIENT
Start: 2024-10-25 | End: 2024-10-26 | Stop reason: HOSPADM

## 2024-10-25 RX ORDER — KETOROLAC TROMETHAMINE 30 MG/ML
30 INJECTION, SOLUTION INTRAMUSCULAR; INTRAVENOUS ONCE
Status: COMPLETED | OUTPATIENT
Start: 2024-10-25 | End: 2024-10-25

## 2024-10-25 RX ORDER — ONDANSETRON 2 MG/ML
4 INJECTION INTRAMUSCULAR; INTRAVENOUS EVERY 30 MIN PRN
Status: DISCONTINUED | OUTPATIENT
Start: 2024-10-25 | End: 2024-10-26 | Stop reason: HOSPADM

## 2024-10-25 RX ORDER — FENTANYL CITRATE 50 UG/ML
50 INJECTION, SOLUTION INTRAMUSCULAR; INTRAVENOUS EVERY 5 MIN PRN
Status: DISCONTINUED | OUTPATIENT
Start: 2024-10-25 | End: 2024-10-26 | Stop reason: HOSPADM

## 2024-10-25 RX ORDER — METHOHEXITAL IN WATER/PF 100MG/10ML
SYRINGE (ML) INTRAVENOUS PRN
Status: DISCONTINUED | OUTPATIENT
Start: 2024-10-25 | End: 2024-10-25

## 2024-10-25 RX ORDER — FENTANYL CITRATE 50 UG/ML
25 INJECTION, SOLUTION INTRAMUSCULAR; INTRAVENOUS EVERY 5 MIN PRN
Status: DISCONTINUED | OUTPATIENT
Start: 2024-10-25 | End: 2024-10-26 | Stop reason: HOSPADM

## 2024-10-25 RX ORDER — HYDROMORPHONE HCL IN WATER/PF 6 MG/30 ML
0.4 PATIENT CONTROLLED ANALGESIA SYRINGE INTRAVENOUS EVERY 5 MIN PRN
Status: DISCONTINUED | OUTPATIENT
Start: 2024-10-25 | End: 2024-10-26 | Stop reason: HOSPADM

## 2024-10-25 RX ADMIN — Medication 100 MG: at 10:06

## 2024-10-25 RX ADMIN — SUCCINYLCHOLINE CHLORIDE 90 MG: 20 INJECTION, SOLUTION INTRAMUSCULAR; INTRAVENOUS; PARENTERAL at 10:06

## 2024-10-25 RX ADMIN — KETOROLAC TROMETHAMINE 30 MG: 30 INJECTION, SOLUTION INTRAMUSCULAR at 09:58

## 2024-10-25 RX ADMIN — LABETALOL HYDROCHLORIDE 20 MG: 5 INJECTION, SOLUTION INTRAVENOUS at 10:06

## 2024-10-25 NOTE — ANESTHESIA POSTPROCEDURE EVALUATION
Patient: Randi Cleary    Procedure: * No procedures listed *  Electroconvulsive Therapy    Anesthesia Type:  General    Note:  Disposition: Outpatient   Postop Pain Control: Uneventful            Sign Out: Well controlled pain   PONV: No   Neuro/Psych: Uneventful            Sign Out: Acceptable/Baseline neuro status   Airway/Respiratory: Uneventful            Sign Out: Acceptable/Baseline resp. status   CV/Hemodynamics: Uneventful            Sign Out: Acceptable CV status; No obvious hypovolemia; No obvious fluid overload   Other NRE:    DID A NON-ROUTINE EVENT OCCUR?            Last vitals:  Vitals:    10/25/24 0936 10/25/24 1015   BP: 116/63 (!) 143/82   Pulse: 75 60   Resp: 16 18   Temp: 36.1  C (97  F) 36.5  C (97.7  F)   SpO2: 93% 97%       Electronically Signed By: Carmen Saldana MD  October 25, 2024  10:29 AM
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
## 2024-10-25 NOTE — TELEPHONE ENCOUNTER
[facility]       Referral Date: 10/13/24 (2nd Outreach)    Reason for Referral:  Case Management    Referral Status: (urgent or general)  General    Method of Communication:   Phone call    Plan or goal of contact/ previous contact information:   SW left VM with contact information and requested a return call.    Follow up required:   No. SW will attempt no further uotreaches    Work done on case:   N/a    Important Information and next steps:   N/a    TANIKA Schuster   - [facility]  [email or web address]  [phone]

## 2024-10-25 NOTE — ANESTHESIA CARE TRANSFER NOTE
Patient: Randi Cleary    Procedure: * No procedures listed *  Electroconvulsive Therapy    Diagnosis: * No pre-op diagnosis entered *  Diagnosis Additional Information: No value filed.    Anesthesia Type:   General     Note:    Oropharynx: spontaneously breathing  Level of Consciousness: drowsy  Oxygen Supplementation: room air    Independent Airway: airway patency satisfactory and stable  Dentition: dentition unchanged  Vital Signs Stable: post-procedure vital signs reviewed and stable  Report to RN Given: handoff report given  Patient transferred to: PACU    Handoff Report: Identifed the Patient, Identified the Reponsible Provider, Reviewed the pertinent medical history, Discussed the surgical course, Reviewed Intra-OP anesthesia mangement and issues during anesthesia, Set expectations for post-procedure period and Allowed opportunity for questions and acknowledgement of understanding      Vitals:  Vitals Value Taken Time   /82 10/25/24 1015   Temp 36.5  C (97.7  F) 10/25/24 1015   Pulse 60 10/25/24 1015   Resp 18 10/25/24 1015   SpO2 97 % 10/25/24 1015       Electronically Signed By: Carmen Saldana MD  October 25, 2024  10:29 AM
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
## 2024-10-25 NOTE — DISCHARGE INSTRUCTIONS
ECT Discharge Instructions      During your ECT series:    Do not drive or work heavy equipment until 7 days after your last treatment.  Do not drink alcohol or use street drugs (illicit drugs) while you are having treatments.  Do not make important decisions, including legal decisions.    After each treatment:    Get plenty of rest. A responsible adult must stay with your for at least 6 hours.  Avoid heavy or risky activities for 24 hours while in maintenance ECT.   Do not drive for at least 24 hours after your treatment while in maintenance ECT.   If you have more than one treatment within one week, do not drive for 7 days after your last treatment.   If you feel light-headed, sit for a few minutes before standing. Have someone help you get up.  If you have nausea (feel sick to your stomach): Drink only clear liquids such as apple juice, ginger ale, broth or 7UP, Be sure to drink plenty of liquids. Move to a normal diet as you feel able.   If you received Toradol, wait 6 hours before taking ibuprofen.  Call your doctor if:   You have a fever over 100F (37.7 C) (taken under the tongue), or a fever that last more than 24 hours.  Your IV site is red, swollen, very painful or is getting more tender.  You have nausea that gets very bad or does not improve.    If you have any symptoms after ECT, tell our staff before your next treatment.  The ECT Department can be reached at [phone].  The ECT Department is open Mondays, Wednesdays and Fridays from 7:00 AM to 2:00 PM.    To speak to a doctor, call:  Your primary care provider or [facility] for Dr. Riley, Dr. Beavers, Dr. Hutchison or Dr. Cotter PHONE: [phone]; fax: [phone]    New instructions:     Continue your trazodone to support your recovery and ease insomnia              -- Remember to NOT take gabapentin after 6pm the night before each treatment  -- You will have to check to make sure somebody can drive you to and from the hospital and  monitor you for 6 hours after each treatment  -- Maintenance ECT  every two weeks, then to once every month.  The goal of maintenance ECT is to space out and eventually stop  -- During maintenance, you can't drive for 24 hours after each treatment.     - We discussed other alternatives including trying ketamine through the [facility] or staying on your regular medications and therapy, but at the moment you were most interested in pursuing maintenance      You have received Toradol today at 9:58 am. Toradol is an NSAID.  Do not take any NSAID's including: Ibuprofen, Naproxen Sodium, Asprin, Advil, Motrin, Aleve, Shannan, etc., until six hours after the above time which would be 3:58 pm. If you have any questions check back with your Physician, or pharmacist.     Covid-19 Testing:  We are no longer requiring covid tests prior to your procedure. If you are ill, please call the ECT department to discuss plan for rescheduling your appointment. [phone]  Please come to the [facility] and check-in with Security before your ECT appointment.  The [facility] is located right next to the Adult Emergency Room.  The address is [address] [place] [address].    After your appointment, you may be picked up at the [facility] entrance, which is located at [address] [place] [address].  [facility], about 1 block North of the [facility].    Transported by:   Louie Little    Reviewed AVS discharge instructions with:   Louie Little

## 2024-10-25 NOTE — TELEPHONE ENCOUNTER
Pt reminder call for 10/28 TC therapy. Appointment confirmed.    Jacqueline Givens  10/25/2024  [phone]

## 2024-10-28 ENCOUNTER — TELEPHONE (OUTPATIENT)
Dept: BEHAVIORAL HEALTH | Facility: CLINIC | Age: 69
End: 2024-10-28
Payer: MEDICARE

## 2024-10-28 ENCOUNTER — TELEPHONE (OUTPATIENT)
Dept: PSYCHIATRY | Facility: CLINIC | Age: 69
End: 2024-10-28
Payer: MEDICARE

## 2024-10-28 NOTE — TELEPHONE ENCOUNTER
Writer spoke with pt on que and scheduled TC therapy appointment on 10/29/2024 @ 4:00 pm as requested.    Jacqueline Givens  10/28/2024  [phone]

## 2024-10-28 NOTE — TELEPHONE ENCOUNTER
Patient informed this writer that dextromethorphan requires a PA. Patient requests the psychiatry team attempt a PA. Called her pharmacy and collected the following insurance info:    ID: [id number]  BIN: [id number]  PCN: [id number]  Group: [id number]    PA initiated via Cover My Meds. Awaiting a response.    Kimberly Zavala RN on 10/28/2024 at 5:49 PM

## 2024-10-28 NOTE — PROGRESS NOTES
"Interventional Psychiatry Program   Treatment Resistant Depression (TRD) Group  [facility], [facility]        Patient: Randi Cleary (1955)    MRN: [id number]  Date of Treatment: October 22, 2024  Duration of Treatment: Start Time: 10:30 am; End Time: 12:00  Providers/Group Facilitators: Daniella Gaona, PhD, LP; Valdo Harper MA (PhD student)     Number of Participants: 6  Group Format: In Person     People present:   Providers: Daniella Gaona and Valdo Harper  Patient: Randi Cleary  Others: Other patients    Encounter Diagnoses   Name Primary?    Severe episode of recurrent major depressive disorder, without psychotic features (H) Yes    Generalized anxiety disorder        Assessment (current symptoms):       10/20/2024     1:55 PM 10/22/2024     8:57 AM 10/24/2024    10:46 AM   PHQ   PHQ-9 Total Score 13 14 13    Q9: Thoughts of better off dead/self-harm past 2 weeks Not at all  Not at all  Not at all        Patient-reported         9/24/2024     9:54 AM 10/8/2024     6:29 AM 10/22/2024     8:59 AM   CHAVO-7 SCORE   Total Score 9 (mild anxiety) 9 (mild anxiety) 11 (moderate anxiety)   Total Score 9 9    9 11    11     The treatment resistant depression (TRD) group is based on the cognitive behavioral therapy model that uses psychoeducation, behavioral activation, mindfulness, supportive techniques, problem solving techniques, and social skills.       Inclusion criteria for group participation: Current patient in the [facility] TRD program; agreement to group format and guidelines discussed in first session     Session 7 Content:  Collect BADS  Mindfulness exercise: body scan (5-10 mins)  Debrief  Updates  Activity planning      Description: Winnie lopez shared her goal in group therapy. She has been frustrated with how Medicare has been handling her bills and said it makes her \"feel like picking fights.\" She added that she sometimes has to " "stop handling her bills and then does not remember where she left off when she comes back to it. She gets frustrated when she forgets, too. 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    Patient's reaction to treatment strategies: positively engaged in group and helpful to others.    Patient's progress toward treatment goals: Pt expressed commitment to working in group therapy.    Planned activity: Winnie wants to file an appeal with Medicare over a medical bill, which she has been trying to do for months. She stated that she actually has \"a couple of bills.\"     Mental Status Exam:  Alertness: alert and oriented  Appearance: well groomed  Behavior/Demeanor: cooperative and pleasant, with good eye contact   Speech: normal  Language: good. Preferred language identified as English.  Psychomotor: normal or unremarkable  Mood: depressed  Affect: restricted; was congruent to mood; was congruent to content  Thought Process/Associations: unremarkable; shared inclusive story  Perception: Reports intact    Insight: appears appropriate  Judgment: appears appropriate  Cognition: does appear grossly intact      Outpatient Treatment Plan     Today's Date: 10/10/24    Date of 1st group meetin/10/24       Diagnosis:  Encounter Diagnoses   Name Primary?    Severe episode of recurrent major depressive disorder, without psychotic features (H) Yes    Generalized anxiety disorder        Current symptoms and circumstances that substantiate the diagnosis:  Persistent dysphoria, anhedonia, sleep disturbance, low energy/fatigue, trouble concentrating, and interfering anxiety and irritability.     How symptoms and/or behaviors are affecting level of function:  Reduced social engagement/social isolation; minimal physical activity    Risk Assessment:  Suicide:  Assessed Level of Immediate Risk: Low  Ideation: denied thoughts of suicide or self-harm  Denies self-harm action  Plan:  denies " plan for suicide and self-harm  Safety: denied safety concerns     Homicide/Violence:  Assessed Level of Immediate Risk: Low  Ideation: Denies  Plan: n/a  Means: No  Intent: No          SYMPTOMS; PROBLEMS   MEASURABLE GOALS;    FUNCTIONAL IMPROVEMENT INTERVENTIONS Gains Made:     Depression reduce depressive symptoms, reduce depressive episodes, and report feeling more positive about self   Increase engagement with value-driven activities BA group therpay Pt has been actively engaged in group and used excellent social skills to connect with other group members     Frequency of Sessions: Weekly    Discharge and Aftercare Goals: see measurable goals above  Expected duration of treatment: 8 weeks  Participants in therapy plan (family, friends, support network): self     Patient verbally consented to the treatment plan above.    Valdo Harper MA  Practicum Therapist

## 2024-10-29 ENCOUNTER — OFFICE VISIT (OUTPATIENT)
Dept: PSYCHIATRY | Facility: CLINIC | Age: 69
End: 2024-10-29
Payer: MEDICARE

## 2024-10-29 ENCOUNTER — VIRTUAL VISIT (OUTPATIENT)
Dept: BEHAVIORAL HEALTH | Facility: CLINIC | Age: 69
End: 2024-10-29
Payer: MEDICARE

## 2024-10-29 DIAGNOSIS — F33.2 SEVERE EPISODE OF RECURRENT MAJOR DEPRESSIVE DISORDER, WITHOUT PSYCHOTIC FEATURES (H): Primary | ICD-10-CM

## 2024-10-29 DIAGNOSIS — F33.1 MAJOR DEPRESSIVE DISORDER, RECURRENT EPISODE, MODERATE (H): Primary | ICD-10-CM

## 2024-10-29 DIAGNOSIS — F41.1 GENERALIZED ANXIETY DISORDER: ICD-10-CM

## 2024-10-29 PROCEDURE — 99207 PR NO CHARGE LOS: CPT | Mod: 95 | Performed by: SOCIAL WORKER

## 2024-10-29 NOTE — PROGRESS NOTES
Transition Clinic Progress Note    Patient Name:   Randi Cleary Date: 2024          Service Type: Individual      VISIT TIME START:       VISIT TIME END:       Session Length:  TC Session Length: 45 (38-52 Minutes)    Session #:  2    Attendees: TC Attendees: Client alone    Service Modality:  Service Modality: Video Visit:      Provider verified identity through the following two step process.  Patient provided:  Patient  and Patient address    Telemedicine Visit: The patient's condition can be safely assessed and treated via synchronous audio and visual telemedicine encounter.      Reason for Telemedicine Visit: Patient has requested telehealth visit    Originating Site (Patient Location): Patient's home    Distant Site (Provider Location): Provider Remote Setting- Home Office    Consent:  The patient/guardian has verbally consented to: the potential risks and benefits of telemedicine (video visit) versus in person care; bill my insurance or make self-payment for services provided; and responsibility for payment of non-covered services.     Patient would like the video invitation sent by:  My Chart    Mode of Communication:  [facility] Conference [facility]    Distant Location (Provider):  Off-site    As the provider I attest to compliance with applicable laws and regulations related to telemedicine.    Informed Consent and Assessment Methods    This provider and patient discussed HIPAA, and the limits of confidentiality; including mandated reporting, the possibility of collaborative discussions with patient's primary care provider and the multidisciplinary team in the MH clinic during consultation.  Discussed the no show policy, and the benefits and possible unintended consequences of therapy.  Patient indicated understanding Transition Clinic services are short term, solution focused, until a referral can be made and patient can bridge to long term therapy.  Patient verbally indicated  understanding the informed consent.        DATA  Interactive Complexity: No  Crisis: No        Progress Since Last Session (Related to Symptoms / Goals / Homework):   Symptoms: has talked to cardiologist.    Homework: Completed in session      Episode of Care Goals: Satisfactory progress - PREPARATION (Decided to change - considering how); Intervened by negotiating a change plan and determining options / strategies for behavior change, identifying triggers, exploring social supports, and working towards setting a date to begin behavior change     Current / Ongoing Stressors and Concerns:   Winnie returns reporting decreased frustration.  She states with Abilify she is less volatile.  She has not talked to spouse and doesn't think she want to.  Wants to maintain status quo.  Winnie has been able to resolve some medical communication concern.  Abilify is helping to stabilize her mood. She she has accepted no ECT for 6 weeks following shoulder surgery. No SI / HI this session.  Winnie has a safety plan she created in the ED on 6/4/2024. Winnie plans to resume long term therapy tomorrow and has several session with Erika Alcantar out to January.        Treatment Objective(s) Addressed in This Session:   identify 2 to 3 patient  fears / thoughts that contribute to feeling anxious  Decrease frequency and intensity of feeling down, depressed, hopeless       Intervention:   Solution Focused Brief Therapy:    Solution-Focused Brief Therapy (SFBT) - Change is perpetual, focus on the problematic excerptions. Reality is subjective and social constructed through conversation.    Assessments completed prior to visit:  The following assessments were completed by patient for this visit:  PHQ9:       9/26/2024    11:52 AM 10/1/2024    11:58 AM 10/8/2024     6:24 AM 10/20/2024     1:55 PM 10/22/2024     8:57 AM 10/24/2024    10:46 AM 10/28/2024     9:20 PM   PHQ-9 SCORE   PHQ-9 Total Score MyChart 15 (Moderately severe depression) 14  "(Moderate depression) 11 (Moderate depression) 13 (Moderate depression) 14 (Moderate depression) 13 (Moderate depression) 12 (Moderate depression)   PHQ-9 Total Score 15 14 11 13 14 13  12        Patient-reported     GAD7:       6/27/2024    12:48 PM 7/1/2024    12:10 PM 7/17/2024     9:55 AM 9/9/2024     1:33 PM 9/24/2024     9:54 AM 10/8/2024     6:29 AM 10/22/2024     8:59 AM   CHAVO-7 SCORE   Total Score 11 (moderate anxiety)  10 (moderate anxiety) 13 (moderate anxiety) 9 (mild anxiety) 9 (mild anxiety) 11 (moderate anxiety)   Total Score 11    11    11    11    11 11 10 13 9 9    9 11    11     CAGE-AID:       6/22/2020     7:12 PM 1/18/2022    11:15 PM 6/6/2024     8:00 AM   CAGE-AID Total Score   Total Score 4 4 4   Total Score MyChart 4 (A total score of 2 or greater is considered clinically significant) 4 (A total score of 2 or greater is considered clinically significant)      PROMIS 10-Global Health (only subscores and total score):       6/6/2024     8:00 AM 7/1/2024    12:10 PM 7/10/2024     9:53 AM 7/17/2024    10:01 AM 10/1/2024    12:02 PM 10/17/2024    12:54 PM 10/24/2024     1:00 PM   PROMIS-10 Scores Only   Global Mental Health Score 8 8 9    9    9    9    9    9 6 6 6 6   Global Physical Health Score 8 11 11    11    11    11    11    11 9 9 9 9   PROMIS TOTAL - SUBSCORES 16 19 20    20    20    20    20    20 15 15 15 15     [place] Suicide Severity Rating Scale (Lifetime/Recent)      5/5/2020     9:21 AM 6/11/2020    10:00 AM 1/9/2023     1:00 PM 5/21/2024     4:49 PM 5/21/2024     9:00 PM 6/6/2024     8:00 AM 7/10/2024    12:00 PM   [place] Suicide Severity Rating (Lifetime/Recent)   Q1 Wish to be Dead (Lifetime) Yes Yes   Yes     Comments \"When I was going through a couple of  years of counseling from a dysfunctional family about 30 years ago or more\" when patient was in treatment and there were family concerns   OD on medications     Q2 Non-Specific Active Suicidal Thoughts (Lifetime) Yes " "Yes   No     Non-Specific Active Suicidal Thought Description (Lifetime) Had thoughts of killing self         Most Severe Ideation Rating (Lifetime) 5 5   5     Most Severe Ideation Description (Lifetime) 35 years ago \"I just wanted to check out , I had thought of it so much and wanted to be done\" when family problems were happening   OD on medication     Frequency (Lifetime) 4 --   3     Duration (Lifetime) 2 --   3     Controllability (Lifetime) 3 0   2     Protective Factors  (Lifetime) 2 5   4     Reasons for Ideation (Lifetime) 5 --   5     Q1 Wished to be Dead (Past Month)   yes 1-->yes 1-->yes 1-->yes    Q2 Suicidal Thoughts (Past Month)   no 1-->yes 1-->yes 1-->yes    Q3 Suicidal Thought Method   no 0-->no 0-->no 1-->yes    Q4 Suicidal Intent without Specific Plan   no 1-->yes 0-->no 0-->no    Q5 Suicide Intent with Specific Plan   no 0-->no 0-->no 1-->yes    Q6 Suicide Behavior (Lifetime)   yes 1-->yes 0-->no 1-->yes    If yes to Q6, within past 3 months?   no 1-->yes 0-->no 0-->no    Level of Risk per Screen   moderate risk high risk moderate risk high risk    RETIRED: 1. Wish to be Dead (Recent) No No        RETIRED: 2. Non-Specific Active Suicidal Thoughts (Recent) No No        3. Active Suicidal Ideation with any Methods (Not Plan) Without Intent to Act (Lifetime) Yes Yes        RETIRE: Active Suicidal Ideation with any Methods (Not Plan) Description (Lifetime) Took many pills of Prozac and was sent to the ED 30 years prior        RETIRED: 3. Active Suicidal Ideation with any Methods (Not Plan) Without Intent to Act (Recent) No         RETIRE: 4. Active Suicidal Ideation with Some Intent to Act, Without Specific Plan (Lifetime) Yes Yes        RETIRE: Active Suicidal Ideation with Some Intent to Act, Without Specific Plan Description (Lifetime) Took many pills of Prozac and was sent to the ED         4. Active Suicidal Ideation with Some Intent to Act, Without Specific Plan (Recent) No         RETIRE: 5. " Active Suicidal Ideation with Specific Plan and Intent (Lifetime) Yes Yes        RETIRE: Active Suicidal Ideation with Specific Plan and Intent Description (Lifetime) Took many pills of Prozac and was sent to the ED over 30 years ago         RETIRED: 5. Active Suicidal Ideation with Specific Plan and Intent (Recent) No         Most Severe Ideation Rating (Past Month) NA         Frequency (Past Month) NA         Duration (Past Month) NA         Controllability (Past Month) NA         Protective Factors (Past Month) NA         Reasons for Ideation (Past Month) NA         Actual Attempt (Lifetime) Yes Yes        Actual Attempt Description (Lifetime) Took a bunch of pills patient reported overdosing on Prozac about thirty years ago        Total Number of Actual Attempts (Lifetime) 1 1        Actual Attempt (Past 3 Months) No No        Has subject engaged in non-suicidal self-injurious behavior? (Lifetime) Yes Yes        Has subject engaged in non-suicidal self-injurious behavior? (Past 3 Months) No No        Interrupted Attempts (Lifetime) No No        Interrupted Attempts (Past 3 Months) No No        Aborted or Self-Interrupted Attempt (Lifetime) No No        Total Number Aborted or Self Interrupted Attempts (Lifetime) 0         Aborted or Self-Interrupted Attempt (Past 3 Months) No No        Preparatory Acts or Behavior (Lifetime) No No        Preparatory Acts or Behavior (Past 3 Months) No No        Most Recent Attempt Date 10/1/1984 --        Comments  30 years prior        Most Recent Attempt Actual Lethality Code 2 0        Comments This is a guess/pt. doesn't recall exact month or day         Most Lethal Attempt Actual Lethality Code 2         Comments only 1 attempt/same attempt as most recent & initial         Initial/First Attempt Date 10/1/1984         Initial/First Attempt Actual Lethality Code 2         Q1 Wish to be Dead (Lifetime)       N   Q2 Non-Specific Active Suicidal Thoughts (Lifetime)       N      [place] Suicide Severity Rating Scale (Short Version)      5/21/2024     4:49 PM 5/21/2024     9:00 PM 6/6/2024     8:00 AM 7/1/2024    12:00 PM 8/7/2024     3:00 PM 10/13/2024     4:00 PM 10/24/2024     1:00 PM   [place] Suicide Severity Rating (Short Version)   Q1 Wished to be Dead (Past Month) 1-->yes 1-->yes 1-->yes       Q2 Suicidal Thoughts (Past Month) 1-->yes 1-->yes 1-->yes       Q3 Suicidal Thought Method 0-->no 0-->no 1-->yes       Q4 Suicidal Intent without Specific Plan 1-->yes 0-->no 0-->no       Q5 Suicide Intent with Specific Plan 0-->no 0-->no 1-->yes       Q6 Suicide Behavior (Lifetime) 1-->yes 0-->no 1-->yes       If yes to Q6, within past 3 months? 1-->yes 0-->no 0-->no       Level of Risk per Screen high risk moderate risk high risk       1. Wish to be Dead (Since Last Contact)    Y Y Y Y   Wish to be Dead Description (Since Last Contact)     tired of living for medical appts overwhelmed with medical issues Overwhelmed with health care needs.   2. Non-Specific Active Suicidal Thoughts (Since Last Contact)    Y N N N   Actual Attempt (Since Last Contact)     N     Has subject engaged in non-suicidal self-injurious behavior? (Since Last Contact)     N     Interrupted Attempts (Since Last Contact)     N     Aborted or Self-Interrupted Attempt (Since Last Contact)     N     Preparatory Acts or Behavior (Since Last Contact)     N     Suicide (Since Last Contact)     N     Calculated C-SSRS Risk Score (Since Last Contact)    Low Risk Low Risk Low Risk Low Risk         ASSESSMENT: Current Emotional / Mental Status (status of significant symptoms):   Risk status (Self / Other harm or suicidal ideation)   Patient denies current fears or concerns for personal safety.   Patient reports the following current or recent suicidal ideation or behaviors: often has thoughts she would be better off if she werent here.   Patient denies current or recent homicidal ideation or behaviors.   Patient denies  current or recent self injurious behavior or ideation.   Patient denies other safety concerns.   Patient reports there has been no change in risk factors since their last session.     Patient reports there has been no change in protective factors since their last session.     A safety and risk management plan has been developed including: Patient consented to co-developed safety plan on 6/10/2024.  Safety and risk management plan was reviewed.   Patient agreed to use safety plan should any safety concerns arise.  A copy was made available to the patient.     Appearance:   Appropriate    Eye Contact:   Good    Psychomotor Behavior: Normal    Attitude:   Cooperative  Friendly   Orientation:   Person Place Time Situation   Speech    Rate / Production: Normal/ Responsive Normal     Volume:  Normal    Mood:    Depressed    Affect:    Appropriate    Thought Content:  Clear    Thought Form:  Coherent  Logical    Insight:    Good      Medication Review:   Changes to psychiatric medications, see updated Medication List in EPIC.      Medication Compliance:   Yes     Changes in Health Issues:   None reported     Chemical Use Review:   History of prescription opiate and cannabis abuse, screen from 8/1/2024      Tobacco Use: No current tobacco use.      Diagnoses:   296.32 (F33.1) Major Depressive Disorder, Recurrent Episode, Moderate _  300.02 (F41.1) Generalized Anxiety Disorder    Collateral Reports Completed:    Collateral: [facility] electronic medical records reviewed. and No Collateral obtained.    PLAN: (Patient Tasks / Therapist Tasks / Other)  Make a list of the medical problems/procedures you want your providers to know about. Discuss he list with your providers. Follow your safety plan.  Return to long term therapy with Erika Colorado tomorrow, 8/30/2024.    Is this the patient's last discharge?: Yes. Reason for Discharge Other: emergency sessions. Patient to return to long term care.  Discharge has been  completed on 8/7/2024 by julián and on 8/21/2024 by Jose Hayes.   Level of care: Outpatient Counseling / Psychiatry    Procedure Code: Psychotherapy (with patient) - 45 [CPT 02472]    Archana Blanton, LICSW                                                         ______________________________________________________________________    Treatment plan completed by Erika Colorado.

## 2024-10-29 NOTE — TELEPHONE ENCOUNTER
Received PA denial letter from [employer]. Will send to HIM for scanning. Routed to RN covering for Kimberly Gotti, EMT

## 2024-10-30 ENCOUNTER — VIRTUAL VISIT (OUTPATIENT)
Dept: PSYCHOLOGY | Facility: CLINIC | Age: 69
End: 2024-10-30
Payer: MEDICARE

## 2024-10-30 DIAGNOSIS — F33.2 MAJOR DEPRESSIVE DISORDER, RECURRENT SEVERE WITHOUT PSYCHOTIC FEATURES (H): ICD-10-CM

## 2024-10-30 DIAGNOSIS — F41.1 GENERALIZED ANXIETY DISORDER: Primary | ICD-10-CM

## 2024-10-30 PROCEDURE — 90837 PSYTX W PT 60 MINUTES: CPT | Mod: 95 | Performed by: MARRIAGE & FAMILY THERAPIST

## 2024-10-30 NOTE — PROGRESS NOTES
[facility]                                     Progress Note    Patient Name: Randi Cleary  Date: 10/30/2024       Service Type: Individual      Session Start Time: 9:30  Session End Time: 10:29     Session Length: 53+    Session #: 6    Attendees: Client    Service Modality:  Video Visit:      Provider verified identity through the following two step process.  Patient provided:  Patient  and Patient address    Telemedicine Visit: The patient's condition can be safely assessed and treated via synchronous audio and visual telemedicine encounter.      Reason for Telemedicine Visit: Patient has requested telehealth visit    Originating Site (Patient Location): Patient's home    Distant Site (Provider Location): Provider Remote Setting- Home Office    Consent:  The patient/guardian has verbally consented to: the potential risks and benefits of telemedicine (video visit) versus in person care; bill my insurance or make self-payment for services provided; and responsibility for payment of non-covered services.     Patient would like the video invitation sent by:  My Chart    Mode of Communication:  Video Conference via Amwell    Distant Location (Provider):  Off-site    As the provider I attest to compliance with applicable laws and regulations related to telemedicine.    DATA  Interactive Complexity: No  Crisis: No        Progress Since Last Session (Related to Symptoms / Goals / Homework):   Symptoms: No change      Homework:  n/a      Episode of Care Goals: Minimal progress - PREPARATION (Decided to change - considering how); Intervened by negotiating a change plan and determining options / strategies for behavior change, identifying triggers, exploring social supports, and working towards setting a date to begin behavior change     Current / Ongoing Stressors and Concerns:   Overwhelmed with medical issues, appointments, and maintaining upkeep of the house.       Treatment Objective(s)  Addressed in This Session:   Distress tolerance     Intervention:   Relationship building, assessment.  Validation, encouragement.  Review of distress tolerance.      Assessments completed prior to visit:  The following assessments were completed by patient for this visit:  PHQ9:       10/1/2024    11:58 AM 10/8/2024     6:24 AM 10/20/2024     1:55 PM 10/22/2024     8:57 AM 10/24/2024    10:46 AM 10/28/2024     9:20 PM 10/30/2024     8:45 AM   PHQ-9 SCORE   PHQ-9 Total Score MyChart 14 (Moderate depression) 11 (Moderate depression) 13 (Moderate depression) 14 (Moderate depression) 13 (Moderate depression) 12 (Moderate depression) 14 (Moderate depression)   PHQ-9 Total Score 14 11 13 14 13  12  14        Patient-reported     GAD7:       6/27/2024    12:48 PM 7/1/2024    12:10 PM 7/17/2024     9:55 AM 9/9/2024     1:33 PM 9/24/2024     9:54 AM 10/8/2024     6:29 AM 10/22/2024     8:59 AM   CHAVO-7 SCORE   Total Score 11 (moderate anxiety)  10 (moderate anxiety) 13 (moderate anxiety) 9 (mild anxiety) 9 (mild anxiety) 11 (moderate anxiety)   Total Score 11    11    11    11    11 11 10 13 9 9    9 11    11     CAGE-AID:       6/22/2020     7:12 PM 1/18/2022    11:15 PM 6/6/2024     8:00 AM   CAGE-AID Total Score   Total Score 4 4 4   Total Score MyChart 4 (A total score of 2 or greater is considered clinically significant) 4 (A total score of 2 or greater is considered clinically significant)      PROMIS 10-Global Health (all questions and answers displayed):       7/1/2024    12:10 PM 7/10/2024     9:53 AM 7/17/2024    10:01 AM 10/1/2024    12:02 PM 10/17/2024    12:54 PM 10/24/2024     1:00 PM 10/30/2024     8:48 AM   PROMIS 10   In general, would you say your health is:  Fair Fair Fair Good  Fair   In general, would you say your quality of life is:  Good Poor Poor Poor  Poor   In general, how would you rate your physical health?  Fair Fair Poor Fair  Poor   In general, how would you rate your mental health, including  your mood and your ability to think?  Fair Fair Fair Fair  Fair   In general, how would you rate your satisfaction with your social activities and relationships?  Poor Poor Poor Poor  Fair   In general, please rate how well you carry out your usual social activities and roles  Poor Fair Poor Poor  Poor   To what extent are you able to carry out your everyday physical activities such as walking, climbing stairs, carrying groceries, or moving a chair?  Moderately A little Moderately Moderately  Moderately   In the past 7 days, how often have you been bothered by emotional problems such as feeling anxious, depressed, or irritable?  Sometimes Often Often Often  Often   In the past 7 days, how would you rate your fatigue on average?  Mild Moderate Moderate Severe  Moderate   In the past 7 days, how would you rate your pain on average, where 0 means no pain, and 10 means worst imaginable pain?  7 7 8 8  7   In general, would you say your health is: 3 2  2  2  3  3 2    In general, would you say your quality of life is: 2 3  1  1  1  1 1    In general, how would you rate your physical health? 2 2  2  1  2  2 1    In general, how would you rate your mental health, including your mood and your ability to think? 3 2  2  2  2  2 2    In general, how would you rate your satisfaction with your social activities and relationships? 1 1  1  1  1  1 2    In general, please rate how well you carry out your usual social activities and roles. (This includes activities at home, at work and in your community, and responsibilities as a parent, child, spouse, employee, friend, etc.) 1 1  2  1  1  1 1    To what extent are you able to carry out your everyday physical activities such as walking, climbing stairs, carrying groceries, or moving a chair? 3 3  2  3  3  3 3    In the past 7 days, how often have you been bothered by emotional problems such as feeling anxious, depressed, or irritable? 4 3  4  4  4  4 4    In the past 7 days, how  "would you rate your fatigue on average? 2 2  3  3  4  4 3    In the past 7 days, how would you rate your pain on average, where 0 means no pain, and 10 means worst imaginable pain? 7 7  7  8  8  8 7    Global Mental Health Score 8 9    9    9    9    9    9 6 6 6 6 7    Global Physical Health Score 11 11    11    11    11    11    11 9 9 9 9 9    PROMIS TOTAL - SUBSCORES 19 20    20    20    20    20    20 15 15 15 15 16        Patient-reported    Multiple values from one day are sorted in reverse-chronological order     [place] Suicide Severity Rating Scale (Lifetime/Recent)      5/5/2020     9:21 AM 6/11/2020    10:00 AM 1/9/2023     1:00 PM 5/21/2024     4:49 PM 5/21/2024     9:00 PM 6/6/2024     8:00 AM 7/10/2024    12:00 PM   [place] Suicide Severity Rating (Lifetime/Recent)   Q1 Wish to be Dead (Lifetime) Yes Yes   Yes     Comments \"When I was going through a couple of  years of counseling from a dysfunctional family about 30 years ago or more\" when patient was in treatment and there were family concerns   OD on medications     Q2 Non-Specific Active Suicidal Thoughts (Lifetime) Yes Yes   No     Non-Specific Active Suicidal Thought Description (Lifetime) Had thoughts of killing self         Most Severe Ideation Rating (Lifetime) 5 5   5     Most Severe Ideation Description (Lifetime) 35 years ago \"I just wanted to check out , I had thought of it so much and wanted to be done\" when family problems were happening   OD on medication     Frequency (Lifetime) 4 --   3     Duration (Lifetime) 2 --   3     Controllability (Lifetime) 3 0   2     Protective Factors  (Lifetime) 2 5   4     Reasons for Ideation (Lifetime) 5 --   5     Q1 Wished to be Dead (Past Month)   yes 1-->yes 1-->yes 1-->yes    Q2 Suicidal Thoughts (Past Month)   no 1-->yes 1-->yes 1-->yes    Q3 Suicidal Thought Method   no 0-->no 0-->no 1-->yes    Q4 Suicidal Intent without Specific Plan   no 1-->yes 0-->no 0-->no    Q5 Suicide Intent with " Specific Plan   no 0-->no 0-->no 1-->yes    Q6 Suicide Behavior (Lifetime)   yes 1-->yes 0-->no 1-->yes    If yes to Q6, within past 3 months?   no 1-->yes 0-->no 0-->no    Level of Risk per Screen   moderate risk high risk moderate risk high risk    RETIRED: 1. Wish to be Dead (Recent) No No        RETIRED: 2. Non-Specific Active Suicidal Thoughts (Recent) No No        3. Active Suicidal Ideation with any Methods (Not Plan) Without Intent to Act (Lifetime) Yes Yes        RETIRE: Active Suicidal Ideation with any Methods (Not Plan) Description (Lifetime) Took many pills of Prozac and was sent to the ED 30 years prior        RETIRED: 3. Active Suicidal Ideation with any Methods (Not Plan) Without Intent to Act (Recent) No         RETIRE: 4. Active Suicidal Ideation with Some Intent to Act, Without Specific Plan (Lifetime) Yes Yes        RETIRE: Active Suicidal Ideation with Some Intent to Act, Without Specific Plan Description (Lifetime) Took many pills of Prozac and was sent to the ED         4. Active Suicidal Ideation with Some Intent to Act, Without Specific Plan (Recent) No         RETIRE: 5. Active Suicidal Ideation with Specific Plan and Intent (Lifetime) Yes Yes        RETIRE: Active Suicidal Ideation with Specific Plan and Intent Description (Lifetime) Took many pills of Prozac and was sent to the ED over 30 years ago         RETIRED: 5. Active Suicidal Ideation with Specific Plan and Intent (Recent) No         Most Severe Ideation Rating (Past Month) NA         Frequency (Past Month) NA         Duration (Past Month) NA         Controllability (Past Month) NA         Protective Factors (Past Month) NA         Reasons for Ideation (Past Month) NA         Actual Attempt (Lifetime) Yes Yes        Actual Attempt Description (Lifetime) Took a bunch of pills patient reported overdosing on Prozac about thirty years ago        Total Number of Actual Attempts (Lifetime) 1 1        Actual Attempt (Past 3 Months) No No         Has subject engaged in non-suicidal self-injurious behavior? (Lifetime) Yes Yes        Has subject engaged in non-suicidal self-injurious behavior? (Past 3 Months) No No        Interrupted Attempts (Lifetime) No No        Interrupted Attempts (Past 3 Months) No No        Aborted or Self-Interrupted Attempt (Lifetime) No No        Total Number Aborted or Self Interrupted Attempts (Lifetime) 0         Aborted or Self-Interrupted Attempt (Past 3 Months) No No        Preparatory Acts or Behavior (Lifetime) No No        Preparatory Acts or Behavior (Past 3 Months) No No        Most Recent Attempt Date 10/1/1984 --        Comments  30 years prior        Most Recent Attempt Actual Lethality Code 2 0        Comments This is a guess/pt. doesn't recall exact month or day         Most Lethal Attempt Actual Lethality Code 2         Comments only 1 attempt/same attempt as most recent & initial         Initial/First Attempt Date 10/1/1984         Initial/First Attempt Actual Lethality Code 2         Q1 Wish to be Dead (Lifetime)       N   Q2 Non-Specific Active Suicidal Thoughts (Lifetime)       N         ASSESSMENT: Current Emotional / Mental Status (status of significant symptoms):   Risk status (Self / Other harm or suicidal ideation)   Patient denies current fears or concerns for personal safety.   Patient reports the following current or recent suicidal ideation or behaviors: passive ideation of wanting to escape the struggles, no plan, no intent.   Patient denies current or recent homicidal ideation or behaviors.   Patient denies current or recent self injurious behavior or ideation.   Patient denies other safety concerns.   Patient reports there has been no change in risk factors since their last session.     Patient reports there has been no change in protective factors since their last session.     A safety and risk management plan has been developed including: Patient consented to co-developed safety plan on  6/4/24.  Safety and risk management plan was reviewed.   Patient agreed to use safety plan should any safety concerns arise.  A copy was made available to the patient.     Appearance:   Appropriate    Eye Contact:   Good    Psychomotor Behavior: Normal    Attitude:   Cooperative  Pleasant   Orientation:   All   Speech    Rate / Production: Slow     Volume:  Normal    Mood:    Sad    Affect:    Appropriate  Subdued    Thought Content:  Clear    Thought Form:  Coherent  Logical    Insight:    Good      Medication Review:   Changes to psychiatric medications, see updated Medication List in EPIC.      Medication Compliance:   Yes     Changes in Health Issues:   None reported     Chemical Use Review:   Substance Use: Chemical use reviewed, no active concerns identified      Tobacco Use: No current tobacco use.      Diagnosis:  1. Generalized anxiety disorder    2. Major depressive disorder, recurrent severe without psychotic features (H)          Collateral Reports Completed:   Not Applicable    PLAN: (Patient Tasks / Therapist Tasks / Other)  Focus on self-care, distress tolerance.  One thing at a time.  Contact RN case manager who called her.  Therapist will contact Archana regarding coordination of care.      Erika Colorado, [facility]                                                         ______________________________________________________________________    Individual Treatment Plan    Patient's Name: Randi Cleary  YOB: 1955    Date of Creation: 7/17/2024  Date Treatment Plan Last Reviewed/Revised: 7/17/2024, 10/30/24     DSM5 Diagnoses:   296.33 (F33.2) Major Depressive Disorder, Recurrent Episode, Severe   300.02 (F41.1) Generalized Anxiety Disorder  Psychosocial / Contextual Factors: history of trauma  PROMIS (reviewed every 90 days):     Referral / Collaboration:  Discussed and patient will pursue a therapy group for depressive symptoms.    Anticipated number of session for this episode of  care: 9-12 sessions  Anticipation frequency of session: Weekly  Anticipated Duration of each session: 38-52 minutes  Treatment plan will be reviewed in 90 days or when goals have been changed.       MeasurableTreatment Goal(s) related to diagnosis / functional impairment(s)  Goal 1: Client will keep self safe and eliminate suicidal ideation and self-harm behaviors.    Objective #A (Client Action)    Client will make a list of at least 10 skills or activities that you will to use to distract from urges to harm self.  Status: Completed - Date: 10/30/24       Intervention(s)  Therapist will provide ideas and strategies for generating coping skills list.    Objective #B  Client will make a list of pros and cons for tolerating and not tolerating an urge to harm self.  Status: Continued - Date(s):10/30/24      Intervention(s)  Therapist will guide client through DBT-style Pros/Cons list for behavior change.    Objective #C  Client will identify and practice the new strategies for dealing with strong emotions, learn and practice relaxation breathing.  Status: Continued - Date(s):10/30/24      Intervention(s)  Therapist will teach distraction skills. Practice them within session and outside of session.    Goal 2: Client will report reduction in anxiety also as evidenced by reduction of CHAVO-7 score below 6 points within the next 12 weeks.    Objective #A (Client Action)    Client will identify at least three triggers for anxiety.  Status: Completed - Date: 10/30/24       Intervention(s)  Therapist will provide educational materials on common triggers and signs of anxiety.    Objective #B  Client will use relaxation strategies at least two times per day to reduce the physical symptoms of anxiety.  Status: Continued - Date(s):10/30/24      Intervention(s)  Therapist will teach relaxation strategies such as mindfulness, deep breathing, muscle relaxation, and sensory activities.    Objective #C  Client will use cognitive  strategies identified in therapy to challenge anxious thoughts.  Status: Continued - Date(s):10/30/24      Intervention(s)  Therapist will teach strategies for cognitive modification using REBT model.    Goal 3: Client will report improved mood as indicated by PHQ-9 score below 6 for consistent 8 weeks.    Objective #A (Client Action)    Status: Continued - Date: 10/30/24     Client will Increase interest, engagement, and pleasure in doing things.    Intervention(s)  Therapist will assign homework for daily involvement in pleasant activities.    Objective #B  Client will Identify negative self-talk and behaviors: challenge core beliefs, myths, and actions.    Status: Continued - Date(s):10/30/24      Intervention(s)  Therapist will teach strategies for cognitive modification using REBT models.    Objective #C  Client will Improve quantity and quality of night time sleep / decrease daytime naps.  Status: Continued - Date(s):10/30/24      Intervention(s)  Therapist will teach sleep hygiene strategies.  Assign homework for daily practice.    Goal 4: Client will report reduced or eliminated physiological activation when recalling a distressing event including decreased re-experiencing, decreased avoidance, and decreased hyperarousal.    Objective #A (Client Action)    Status:  Continued: 10/30/24       Client will acquire knowledge of trauma, common symptoms post-trauma, emotion regulation strategies, stress management strategies, and cognitive coping strategies.    Intervention(s)  Therapist will teach about effects of trauma on mind and body; encourage emotion identification and expression; practice with client the stress management strategies; and teach cognitive modification.    Objective #B  Client will use Brainspotting while focusing on physiological sensations related to distressing events.  Client will assess and rate level of physiological activation.  Status: Continued - Date(s):10/30/24      Intervention(s)  Therapist will provide use a pointer or other materials to assist client in holding a brainspot in their visual field.  Therapist might also provide biolateral music through headphones to deepen the experience.         Patient has reviewed and agreed to the above plan.      Erika Colorado, LMFT  July 17, 2024

## 2024-10-31 NOTE — PROGRESS NOTES
Interventional Psychiatry Program   Treatment Resistant Depression (TRD) Group  [facility], [facility]        Patient: Randi Cleary (1955)    MRN: [id number]  Date of Treatment: October 29, 2024  Duration of Treatment: Start Time: 10:30 am; End Time: 12:00  Providers/Group Facilitators: Daniella Gaona, PhD, LP; Valdo Harper MA (PhD student)    Number of Participants: 6  Group Format: In Person     People present:   Providers: Daniella Gaona and Valdo Harper  Patient: Randi Cleary  Others: Other patients    Encounter Diagnoses   Name Primary?    Severe episode of recurrent major depressive disorder, without psychotic features (H) Yes    Generalized anxiety disorder        Assessment (current symptoms):       10/24/2024    10:46 AM 10/28/2024     9:20 PM 10/30/2024     8:45 AM   PHQ   PHQ-9 Total Score 13  12  14    Q9: Thoughts of better off dead/self-harm past 2 weeks Not at all  Not at all  Not at all        Patient-reported         9/24/2024     9:54 AM 10/8/2024     6:29 AM 10/22/2024     8:59 AM   CHAVO-7 SCORE   Total Score 9 (mild anxiety) 9 (mild anxiety) 11 (moderate anxiety)   Total Score 9 9    9 11    11     The treatment resistant depression (TRD) group is based on the cognitive behavioral therapy model that uses psychoeducation, behavioral activation, mindfulness, supportive techniques, problem solving techniques, and social skills.       Inclusion criteria for group participation: Current patient in the [facility] TRD program; agreement to group format and guidelines discussed in first session     Session 8 Content:  Collected the BADS-SF  Mindfulness exercise: basics  Briefly discussed reactions  Elective topic: Assertiveness   Elective topic: Transitions  Wrote affirmational cards for other group members  Distributed and collected feedback forms    Description: Winnie was encouraging toward another group member who felt marginalized  by commenting on how norms and expectations change across generations.      Winnie stated that she has not been on antidepressants for 30 years. Her Wellbutrin has been giving her tinnitus.      Patient's reaction to treatment strategies: Pt was positively engaged in group and helpful to others. Pt expressed intent to join the TRD group starting 2024 with facilitators Arsenio Jiang, PhD and Tiffany Mustafa MA.    Patient's progress toward treatment goals: Pt expressed commitment to working in group therapy.      Mental Status Exam:  Alertness: alert and oriented  Appearance: well groomed  Behavior/Demeanor: cooperative and pleasant, with good eye contact   Speech: normal  Language: good. Preferred language identified as English.  Psychomotor: normal or unremarkable  Mood: depressed  Affect: restricted; was congruent to mood; was congruent to content  Thought Process/Associations: unremarkable; shared inclusive story  Perception: Reports intact    Insight: appears appropriate  Judgment: appears appropriate  Cognition: does appear grossly intact      Outpatient Treatment Plan     Today's Date: 10/10/24    Date of 1st group meetin/10/24       Diagnosis:  Encounter Diagnoses   Name Primary?    Severe episode of recurrent major depressive disorder, without psychotic features (H) Yes    Generalized anxiety disorder          Current symptoms and circumstances that substantiate the diagnosis:  Persistent dysphoria, anhedonia, sleep disturbance, low energy/fatigue, trouble concentrating, and interfering anxiety and irritability.     How symptoms and/or behaviors are affecting level of function:  Reduced social engagement/social isolation; minimal physical activity    Risk Assessment:  Suicide:  Assessed Level of Immediate Risk: Low  Ideation: denied thoughts of suicide or self-harm  Denies self-harm action  Plan:  denies plan for suicide and self-harm  Safety: denied safety concerns     Homicide/Violence:  Assessed  Level of Immediate Risk: Low  Ideation: Denies  Plan: n/a  Means: No  Intent: No          SYMPTOMS; PROBLEMS   MEASURABLE GOALS;    FUNCTIONAL IMPROVEMENT INTERVENTIONS Gains Made:     Depression reduce depressive symptoms, reduce depressive episodes, and report feeling more positive about self   Increase engagement with value-driven activities BA group therpay Pt has been actively engaged in group and used excellent social skills to connect with other group members     Frequency of Sessions: Weekly    Discharge and Aftercare Goals: see measurable goals above  Expected duration of treatment: 8 weeks  Participants in therapy plan (family, friends, support network): self     Patient verbally consented to the treatment plan above.    Valdo Harper MA  Practicum Therapist    I co-facilitated (and was present for the entire duration of) this group therapy session with the above trainee and discussed this participant in individual supervision. I agree with the note and plan as documented.     Supervisor: Daniella Gaona, PhD, LP
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
## 2024-11-01 ENCOUNTER — TELEPHONE (OUTPATIENT)
Dept: BEHAVIORAL HEALTH | Facility: CLINIC | Age: 69
End: 2024-11-01
Payer: MEDICARE

## 2024-11-01 ENCOUNTER — VIRTUAL VISIT (OUTPATIENT)
Dept: PSYCHIATRY | Facility: CLINIC | Age: 69
End: 2024-11-01
Payer: MEDICARE

## 2024-11-01 VITALS — WEIGHT: 167 LBS | BODY MASS INDEX: 26.95 KG/M2

## 2024-11-01 DIAGNOSIS — F43.10 COMPLEX POSTTRAUMATIC STRESS DISORDER: ICD-10-CM

## 2024-11-01 DIAGNOSIS — G47.00 PERSISTENT INSOMNIA: ICD-10-CM

## 2024-11-01 DIAGNOSIS — F33.2 SEVERE EPISODE OF RECURRENT MAJOR DEPRESSIVE DISORDER, WITHOUT PSYCHOTIC FEATURES (H): Primary | ICD-10-CM

## 2024-11-01 DIAGNOSIS — F41.1 GENERALIZED ANXIETY DISORDER: ICD-10-CM

## 2024-11-01 PROCEDURE — 99214 OFFICE O/P EST MOD 30 MIN: CPT | Mod: 95

## 2024-11-01 PROCEDURE — 90833 PSYTX W PT W E/M 30 MIN: CPT | Mod: 95

## 2024-11-01 PROCEDURE — G2211 COMPLEX E/M VISIT ADD ON: HCPCS | Mod: 95

## 2024-11-01 RX ORDER — BUPROPION HYDROCHLORIDE 100 MG/1
100 TABLET, EXTENDED RELEASE ORAL EVERY MORNING
Qty: 30 TABLET | Refills: 0 | Status: SHIPPED | OUTPATIENT
Start: 2024-11-01

## 2024-11-01 RX ORDER — DEXTROMETHORPHAN HBR 15 MG/1
45 CAPSULE, LIQUID FILLED ORAL EVERY MORNING
Qty: 90 CAPSULE | Refills: 0 | Status: SHIPPED | OUTPATIENT
Start: 2024-11-01

## 2024-11-01 RX ORDER — TRAZODONE HYDROCHLORIDE 50 MG/1
25-50 TABLET, FILM COATED ORAL
Qty: 30 TABLET | Refills: 0 | Status: SHIPPED | OUTPATIENT
Start: 2024-11-01

## 2024-11-01 RX ORDER — GABAPENTIN 300 MG/1
300 CAPSULE ORAL
Qty: 30 CAPSULE | Refills: 1 | Status: SHIPPED | OUTPATIENT
Start: 2024-11-01

## 2024-11-01 NOTE — TELEPHONE ENCOUNTER
Patient called the [facility] wanting to schedule with Archana for the week of Thanksgiving due to her long-term provider not being available. Patient was scheduled for 11/25.    Patient also wanted to schedule some appointments for January. The TC schedule is not available for January as of yet. Patient was advised to check back in a couple of weeks.    Aparna,    [facility]

## 2024-11-01 NOTE — NURSING NOTE
Current patient location: [address] [place] [id number]    Is the patient currently in the state of [place]? YES    Visit mode:VIDEO    If the visit is dropped, the patient can be reconnected by: VIDEO VISIT: Text to cell phone:   Telephone Information:   Mobile [phone]       Will anyone else be joining the visit? NO  (If patient encounters technical issues they should call [phone]    Are changes needed to the allergy or medication list? No    Are refills needed on medications prescribed by this physician? Discuss with provider    Rooming Documentation:  Questionnaire(s) completed    Reason for visit: RECHECK    Sarika GARCIAF

## 2024-11-01 NOTE — PATIENT INSTRUCTIONS
Medication Plan  -Decrease gabapentin to 300mg at bedtime PRN and 100mg Q6H PRN for anxiety. Max daily dose: 700mg  -Continue Dextromethorphan 45mg and bupropion SR 100mg every morning (to mimic Auvelity)  -Continue melatonin 6mg nightly  -Continue trazodone 50mg at bedtime PRN    **For crisis resources, please see the information at the end of this document**   Patient Education    Thank you for coming to the [facility].     Lab Testing:  If you had lab testing today and your results are reassuring or normal they will be mailed to you or sent through Work in Field within 7 days. If the lab tests need quick action we will call you with the results. The phone number we will call with results is # [phone]. If this is not the best number please call our clinic and change the number.     Medication Refills:  If you need any refills please call your pharmacy and they will contact us. Our fax number for refills is [phone].   Three business days of notice are needed for general medication refill requests.   Five business days of notice are needed for controlled substance refill requests.   If you need to change to a different pharmacy, please contact the new pharmacy directly. The new pharmacy will help you get your medications transferred.     Contact Us:  Please call [phone] during business hours (8-5:00 M-F).   If you have medication related questions after clinic hours, or on the weekend, please call [phone].     Financial Assistance [phone]   Medical Records [phone]       MENTAL HEALTH CRISIS RESOURCES:  For a emergency help, please call 911 or go to the nearest Emergency Department.     Emergency Walk-In Options:   [facility] Unit @ [facility] ([place]): [phone] - Specialized mental health emergency area designed to be calming  [facility] ([place]): [phone]  [facility] ([place]):  [phone]  [facility] ([place]): [phone]    [facility] Crisis Information:   [place]: [phone]  [place]: [phone]  Víctor ABURTO) - Adult: [phone]     Child: [phone]  [place] - Adult: [phone]     Child: [phone]  [place]: [phone]  List of all [facility] resources:   https://[place].[email or web address]/dhs/people-we-serve/adults/health-care/mental-health/resources/crisis-contacts.jsp    National Crisis Information:   Crisis Text Line: Text  [place]  to 096365  Suicide & Crisis Lifeline: 988  National Suicide Prevention Lifeline: 5-727-815-TALK (1-548.564.2708)       For online chat options, visit https://suicidepreventionlifeline.org/chat/  Poison Control Center: 1-743.163.4200  Trans Lifeline: 0-857-595-1448 - Hotline for transgender people of all ages  The [employer]: 8-212-995-6511 - Hotline for LGBT youth     For Non-Emergency Support:   Fast Tracker: Mental Health & Substance Use Disorder Resources -   https://www.[employer].org/

## 2024-11-04 ASSESSMENT — ANXIETY QUESTIONNAIRES
8. IF YOU CHECKED OFF ANY PROBLEMS, HOW DIFFICULT HAVE THESE MADE IT FOR YOU TO DO YOUR WORK, TAKE CARE OF THINGS AT HOME, OR GET ALONG WITH OTHER PEOPLE?: VERY DIFFICULT
1. FEELING NERVOUS, ANXIOUS, OR ON EDGE: MORE THAN HALF THE DAYS
2. NOT BEING ABLE TO STOP OR CONTROL WORRYING: MORE THAN HALF THE DAYS
GAD7 TOTAL SCORE: 10
3. WORRYING TOO MUCH ABOUT DIFFERENT THINGS: SEVERAL DAYS
6. BECOMING EASILY ANNOYED OR IRRITABLE: SEVERAL DAYS
IF YOU CHECKED OFF ANY PROBLEMS ON THIS QUESTIONNAIRE, HOW DIFFICULT HAVE THESE PROBLEMS MADE IT FOR YOU TO DO YOUR WORK, TAKE CARE OF THINGS AT HOME, OR GET ALONG WITH OTHER PEOPLE: VERY DIFFICULT
7. FEELING AFRAID AS IF SOMETHING AWFUL MIGHT HAPPEN: SEVERAL DAYS
5. BEING SO RESTLESS THAT IT IS HARD TO SIT STILL: SEVERAL DAYS
4. TROUBLE RELAXING: MORE THAN HALF THE DAYS
GAD7 TOTAL SCORE: 10
7. FEELING AFRAID AS IF SOMETHING AWFUL MIGHT HAPPEN: SEVERAL DAYS
GAD7 TOTAL SCORE: 10

## 2024-11-05 ENCOUNTER — OFFICE VISIT (OUTPATIENT)
Dept: PSYCHIATRY | Facility: CLINIC | Age: 69
End: 2024-11-05
Payer: MEDICARE

## 2024-11-05 DIAGNOSIS — F33.2 SEVERE EPISODE OF RECURRENT MAJOR DEPRESSIVE DISORDER, WITHOUT PSYCHOTIC FEATURES (H): Primary | ICD-10-CM

## 2024-11-05 DIAGNOSIS — F41.1 GENERALIZED ANXIETY DISORDER: ICD-10-CM

## 2024-11-06 DIAGNOSIS — E83.110 HEREDITARY HEMOCHROMATOSIS (H): Primary | ICD-10-CM

## 2024-11-06 NOTE — NURSING NOTE
Sent staff msg to Dr. Simmons alerting her to new labs available. Eli Hilton RN on 5/19/2021 at 1:19 PM     independent

## 2024-11-07 ENCOUNTER — VIRTUAL VISIT (OUTPATIENT)
Dept: PSYCHOLOGY | Facility: CLINIC | Age: 69
End: 2024-11-07
Payer: MEDICARE

## 2024-11-07 DIAGNOSIS — F41.1 GENERALIZED ANXIETY DISORDER: Primary | ICD-10-CM

## 2024-11-07 DIAGNOSIS — F33.2 MAJOR DEPRESSIVE DISORDER, RECURRENT SEVERE WITHOUT PSYCHOTIC FEATURES (H): ICD-10-CM

## 2024-11-07 PROCEDURE — 90837 PSYTX W PT 60 MINUTES: CPT | Mod: 95 | Performed by: MARRIAGE & FAMILY THERAPIST

## 2024-11-07 ASSESSMENT — PATIENT HEALTH QUESTIONNAIRE - PHQ9: SUM OF ALL RESPONSES TO PHQ QUESTIONS 1-9: 17

## 2024-11-07 ASSESSMENT — ANXIETY QUESTIONNAIRES: GAD7 TOTAL SCORE: 17

## 2024-11-07 NOTE — PROGRESS NOTES
[facility]                                     Progress Note    Patient Name: Randi Cleary  Date: 2024       Service Type: Individual      Session Start Time: 1:30  Session End Time: 2:27     Session Length: 53+    Session #: 7    Attendees: Client    Service Modality:  Video Visit:      Provider verified identity through the following two step process.  Patient provided:  Patient  and Patient address    Telemedicine Visit: The patient's condition can be safely assessed and treated via synchronous audio and visual telemedicine encounter.      Reason for Telemedicine Visit: Patient has requested telehealth visit    Originating Site (Patient Location): Patient's home    Distant Site (Provider Location): Provider Remote Setting- Home Office    Consent:  The patient/guardian has verbally consented to: the potential risks and benefits of telemedicine (video visit) versus in person care; bill my insurance or make self-payment for services provided; and responsibility for payment of non-covered services.     Patient would like the video invitation sent by:  My Chart    Mode of Communication:  Video Conference via Amwell    Distant Location (Provider):  Off-site    As the provider I attest to compliance with applicable laws and regulations related to telemedicine.    DATA  Interactive Complexity: No  Crisis: No        Progress Since Last Session (Related to Symptoms / Goals / Homework):   Symptoms: No change      Homework:  n/a      Episode of Care Goals: Satisfactory progress - ACTION (Actively working towards change); Intervened by reinforcing change plan / affirming steps taken     Current / Ongoing Stressors and Concerns:   Desire to simplify medical appointments and spend more time on creative outlets.  She is reminiscent of childhood experiences and how they shaped her.  Concern about election results.     Treatment Objective(s) Addressed in This Session:   Distress  tolerance     Intervention:   Relationship building.  Validation, encouragement.  Reflecting on emotional experiences, generational cycles.      Assessments completed prior to visit:  The following assessments were completed by patient for this visit:  PHQ9:       10/20/2024     1:55 PM 10/22/2024     8:57 AM 10/24/2024    10:46 AM 10/28/2024     9:20 PM 10/30/2024     8:45 AM 11/4/2024     2:46 PM 11/6/2024    12:50 PM   PHQ-9 SCORE   PHQ-9 Total Score MyChart 13 (Moderate depression) 14 (Moderate depression) 13 (Moderate depression) 12 (Moderate depression) 14 (Moderate depression) 15 (Moderately severe depression) 15 (Moderately severe depression)   PHQ-9 Total Score 13 14 13  12  14  15  15        Patient-reported     GAD7:       7/1/2024    12:10 PM 7/17/2024     9:55 AM 9/9/2024     1:33 PM 9/24/2024     9:54 AM 10/8/2024     6:29 AM 10/22/2024     8:59 AM 11/4/2024     2:38 PM   CHAVO-7 SCORE   Total Score  10 (moderate anxiety) 13 (moderate anxiety) 9 (mild anxiety) 9 (mild anxiety) 11 (moderate anxiety) 10 (moderate anxiety)   Total Score 11 10 13 9 9    9 11    11 10        Patient-reported    Multiple values from one day are sorted in reverse-chronological order     CAGE-AID:       6/22/2020     7:12 PM 1/18/2022    11:15 PM 6/6/2024     8:00 AM   CAGE-AID Total Score   Total Score 4 4 4   Total Score MyChart 4 (A total score of 2 or greater is considered clinically significant) 4 (A total score of 2 or greater is considered clinically significant)      PROMIS 10-Global Health (all questions and answers displayed):       7/10/2024     9:53 AM 7/17/2024    10:01 AM 10/1/2024    12:02 PM 10/17/2024    12:54 PM 10/24/2024     1:00 PM 10/30/2024     8:48 AM 11/6/2024    12:54 PM   PROMIS 10   In general, would you say your health is: Fair Fair    Fair Fair   In general, would you say your quality of life is: Good Poor    Poor Poor   In general, how would you rate your physical health? Fair Fair    Poor Good    In general, how would you rate your mental health, including your mood and your ability to think? Fair Fair    Fair Fair   In general, how would you rate your satisfaction with your social activities and relationships? Poor Poor    Fair Poor   In general, please rate how well you carry out your usual social activities and roles Poor Fair    Poor Poor   To what extent are you able to carry out your everyday physical activities such as walking, climbing stairs, carrying groceries, or moving a chair? Moderately A little    Moderately Moderately   In the past 7 days, how often have you been bothered by emotional problems such as feeling anxious, depressed, or irritable? Sometimes Often    Often Often   In the past 7 days, how would you rate your fatigue on average? Mild Moderate    Moderate Moderate   In the past 7 days, how would you rate your pain on average, where 0 means no pain, and 10 means worst imaginable pain? 7 7    7 7   In general, would you say your health is: 2  2     2  2    In general, would you say your quality of life is: 3  1     1  1    In general, how would you rate your physical health? 2  2     1  3    In general, how would you rate your mental health, including your mood and your ability to think? 2  2     2  2    In general, how would you rate your satisfaction with your social activities and relationships? 1  1     2  1    In general, please rate how well you carry out your usual social activities and roles. (This includes activities at home, at work and in your community, and responsibilities as a parent, child, spouse, employee, friend, etc.) 1  2     1  1    To what extent are you able to carry out your everyday physical activities such as walking, climbing stairs, carrying groceries, or moving a chair? 3  2     3  3    In the past 7 days, how often have you been bothered by emotional problems such as feeling anxious, depressed, or irritable? 3  4     4  4    In the past 7 days, how would  "you rate your fatigue on average? 2  3     3  3    In the past 7 days, how would you rate your pain on average, where 0 means no pain, and 10 means worst imaginable pain? 7  7     7  7    Global Mental Health Score 9        9    9    9    9 6    7  6    Global Physical Health Score 11        11    11    11    11 9    9  11    PROMIS TOTAL - SUBSCORES 20        20    20    20    20 15    16  17        Information is confidential and restricted. Go to Review Flowsheets to unlock data.    Patient-reported    Multiple values from one day are sorted in reverse-chronological order     [place] Suicide Severity Rating Scale (Lifetime/Recent)      5/5/2020     9:21 AM 6/11/2020    10:00 AM 1/9/2023     1:00 PM 5/21/2024     4:49 PM 5/21/2024     9:00 PM 6/6/2024     8:00 AM 7/10/2024    12:00 PM   [place] Suicide Severity Rating (Lifetime/Recent)   Q1 Wish to be Dead (Lifetime) Yes Yes   Yes     Comments \"When I was going through a couple of  years of counseling from a dysfunctional family about 30 years ago or more\" when patient was in treatment and there were family concerns   OD on medications     Q2 Non-Specific Active Suicidal Thoughts (Lifetime) Yes Yes   No     Non-Specific Active Suicidal Thought Description (Lifetime) Had thoughts of killing self         Most Severe Ideation Rating (Lifetime) 5 5   5     Most Severe Ideation Description (Lifetime) 35 years ago \"I just wanted to check out , I had thought of it so much and wanted to be done\" when family problems were happening   OD on medication     Frequency (Lifetime) 4 --   3     Duration (Lifetime) 2 --   3     Controllability (Lifetime) 3 0   2     Protective Factors  (Lifetime) 2 5   4     Reasons for Ideation (Lifetime) 5 --   5     Q1 Wished to be Dead (Past Month)   yes 1-->yes 1-->yes 1-->yes    Q2 Suicidal Thoughts (Past Month)   no 1-->yes 1-->yes 1-->yes    Q3 Suicidal Thought Method   no 0-->no 0-->no 1-->yes    Q4 Suicidal Intent without Specific Plan "   no 1-->yes 0-->no 0-->no    Q5 Suicide Intent with Specific Plan   no 0-->no 0-->no 1-->yes    Q6 Suicide Behavior (Lifetime)   yes 1-->yes 0-->no 1-->yes    If yes to Q6, within past 3 months?   no 1-->yes 0-->no 0-->no    Level of Risk per Screen   moderate risk high risk moderate risk high risk    RETIRED: 1. Wish to be Dead (Recent) No No        RETIRED: 2. Non-Specific Active Suicidal Thoughts (Recent) No No        3. Active Suicidal Ideation with any Methods (Not Plan) Without Intent to Act (Lifetime) Yes Yes        RETIRE: Active Suicidal Ideation with any Methods (Not Plan) Description (Lifetime) Took many pills of Prozac and was sent to the ED 30 years prior        RETIRED: 3. Active Suicidal Ideation with any Methods (Not Plan) Without Intent to Act (Recent) No         RETIRE: 4. Active Suicidal Ideation with Some Intent to Act, Without Specific Plan (Lifetime) Yes Yes        RETIRE: Active Suicidal Ideation with Some Intent to Act, Without Specific Plan Description (Lifetime) Took many pills of Prozac and was sent to the ED         4. Active Suicidal Ideation with Some Intent to Act, Without Specific Plan (Recent) No         RETIRE: 5. Active Suicidal Ideation with Specific Plan and Intent (Lifetime) Yes Yes        RETIRE: Active Suicidal Ideation with Specific Plan and Intent Description (Lifetime) Took many pills of Prozac and was sent to the ED over 30 years ago         RETIRED: 5. Active Suicidal Ideation with Specific Plan and Intent (Recent) No         Most Severe Ideation Rating (Past Month) NA         Frequency (Past Month) NA         Duration (Past Month) NA         Controllability (Past Month) NA         Protective Factors (Past Month) NA         Reasons for Ideation (Past Month) NA         Actual Attempt (Lifetime) Yes Yes        Actual Attempt Description (Lifetime) Took a bunch of pills patient reported overdosing on Prozac about thirty years ago        Total Number of Actual Attempts  (Lifetime) 1 1        Actual Attempt (Past 3 Months) No No        Has subject engaged in non-suicidal self-injurious behavior? (Lifetime) Yes Yes        Has subject engaged in non-suicidal self-injurious behavior? (Past 3 Months) No No        Interrupted Attempts (Lifetime) No No        Interrupted Attempts (Past 3 Months) No No        Aborted or Self-Interrupted Attempt (Lifetime) No No        Total Number Aborted or Self Interrupted Attempts (Lifetime) 0         Aborted or Self-Interrupted Attempt (Past 3 Months) No No        Preparatory Acts or Behavior (Lifetime) No No        Preparatory Acts or Behavior (Past 3 Months) No No        Most Recent Attempt Date 10/1/1984 --        Comments  30 years prior        Most Recent Attempt Actual Lethality Code 2 0        Comments This is a guess/pt. doesn't recall exact month or day         Most Lethal Attempt Actual Lethality Code 2         Comments only 1 attempt/same attempt as most recent & initial         Initial/First Attempt Date 10/1/1984         Initial/First Attempt Actual Lethality Code 2         Q1 Wish to be Dead (Lifetime)       N   Q2 Non-Specific Active Suicidal Thoughts (Lifetime)       N         ASSESSMENT: Current Emotional / Mental Status (status of significant symptoms):   Risk status (Self / Other harm or suicidal ideation)   Patient denies current fears or concerns for personal safety.   Patient reports the following current or recent suicidal ideation or behaviors: passive ideation of wanting to escape the struggles, no plan, no intent.   Patient denies current or recent homicidal ideation or behaviors.   Patient denies current or recent self injurious behavior or ideation.   Patient denies other safety concerns.   Patient reports there has been no change in risk factors since their last session.     Patient reports there has been no change in protective factors since their last session.     A safety and risk management plan has been developed  including: Patient consented to co-developed safety plan on 6/4/24.  Safety and risk management plan was reviewed.   Patient agreed to use safety plan should any safety concerns arise.  A copy was made available to the patient.     Appearance:   Appropriate    Eye Contact:   Good    Psychomotor Behavior: Normal    Attitude:   Cooperative  Pleasant   Orientation:   All   Speech    Rate / Production: Slow     Volume:  Normal    Mood:    Normal   Affect:    Appropriate  Subdued    Thought Content:  Clear    Thought Form:  Coherent  Logical    Insight:    Good      Medication Review:   No changes to current psychiatric medication(s)     Medication Compliance:   Yes     Changes in Health Issues:   None reported     Chemical Use Review:   Substance Use: Chemical use reviewed, no active concerns identified      Tobacco Use: No current tobacco use.      Diagnosis:  1. Generalized anxiety disorder    2. Major depressive disorder, recurrent severe without psychotic features (H)          Collateral Reports Completed:   Not Applicable    PLAN: (Patient Tasks / Therapist Tasks / Other)  Focus on self-care, distress tolerance.  Consider writing her stories in a journal.      Erika Colorado, LMFT                                                         ______________________________________________________________________    Individual Treatment Plan    Patient's Name: Randi Cleary  YOB: 1955    Date of Creation: 7/17/2024  Date Treatment Plan Last Reviewed/Revised: 7/17/2024, 10/30/24     DSM5 Diagnoses:   296.33 (F33.2) Major Depressive Disorder, Recurrent Episode, Severe   300.02 (F41.1) Generalized Anxiety Disorder  Psychosocial / Contextual Factors: history of trauma  PROMIS (reviewed every 90 days):     Referral / Collaboration:  Discussed and patient will pursue a therapy group for depressive symptoms.    Anticipated number of session for this episode of care: 9-12 sessions  Anticipation frequency  of session: Weekly  Anticipated Duration of each session: 38-52 minutes  Treatment plan will be reviewed in 90 days or when goals have been changed.       MeasurableTreatment Goal(s) related to diagnosis / functional impairment(s)  Goal 1: Client will keep self safe and eliminate suicidal ideation and self-harm behaviors.    Objective #A (Client Action)    Client will make a list of at least 10 skills or activities that you will to use to distract from urges to harm self.  Status: Completed - Date: 10/30/24       Intervention(s)  Therapist will provide ideas and strategies for generating coping skills list.    Objective #B  Client will make a list of pros and cons for tolerating and not tolerating an urge to harm self.  Status: Continued - Date(s):10/30/24      Intervention(s)  Therapist will guide client through DBT-style Pros/Cons list for behavior change.    Objective #C  Client will identify and practice the new strategies for dealing with strong emotions, learn and practice relaxation breathing.  Status: Continued - Date(s):10/30/24      Intervention(s)  Therapist will teach distraction skills. Practice them within session and outside of session.    Goal 2: Client will report reduction in anxiety also as evidenced by reduction of CHAVO-7 score below 6 points within the next 12 weeks.    Objective #A (Client Action)    Client will identify at least three triggers for anxiety.  Status: Completed - Date: 10/30/24       Intervention(s)  Therapist will provide educational materials on common triggers and signs of anxiety.    Objective #B  Client will use relaxation strategies at least two times per day to reduce the physical symptoms of anxiety.  Status: Continued - Date(s):10/30/24      Intervention(s)  Therapist will teach relaxation strategies such as mindfulness, deep breathing, muscle relaxation, and sensory activities.    Objective #C  Client will use cognitive strategies identified in therapy to challenge  anxious thoughts.  Status: Continued - Date(s):10/30/24      Intervention(s)  Therapist will teach strategies for cognitive modification using REBT model.    Goal 3: Client will report improved mood as indicated by PHQ-9 score below 6 for consistent 8 weeks.    Objective #A (Client Action)    Status: Continued - Date: 10/30/24     Client will Increase interest, engagement, and pleasure in doing things.    Intervention(s)  Therapist will assign homework for daily involvement in pleasant activities.    Objective #B  Client will Identify negative self-talk and behaviors: challenge core beliefs, myths, and actions.    Status: Continued - Date(s):10/30/24      Intervention(s)  Therapist will teach strategies for cognitive modification using REBT models.    Objective #C  Client will Improve quantity and quality of night time sleep / decrease daytime naps.  Status: Continued - Date(s):10/30/24      Intervention(s)  Therapist will teach sleep hygiene strategies.  Assign homework for daily practice.    Goal 4: Client will report reduced or eliminated physiological activation when recalling a distressing event including decreased re-experiencing, decreased avoidance, and decreased hyperarousal.    Objective #A (Client Action)    Status:  Continued: 10/30/24       Client will acquire knowledge of trauma, common symptoms post-trauma, emotion regulation strategies, stress management strategies, and cognitive coping strategies.    Intervention(s)  Therapist will teach about effects of trauma on mind and body; encourage emotion identification and expression; practice with client the stress management strategies; and teach cognitive modification.    Objective #B  Client will use Brainspotting while focusing on physiological sensations related to distressing events.  Client will assess and rate level of physiological activation.  Status: Continued - Date(s):10/30/24     Intervention(s)  Therapist will provide use a pointer or other  materials to assist client in holding a brainspot in their visual field.  Therapist might also provide biolateral music through headphones to deepen the experience.         Patient has reviewed and agreed to the above plan.      Erika Colorado, LMFT  July 17, 2024

## 2024-11-08 ENCOUNTER — HOSPITAL ENCOUNTER (OUTPATIENT)
Dept: BEHAVIORAL HEALTH | Facility: CLINIC | Age: 69
Discharge: HOME | End: 2024-11-08
Attending: PSYCHIATRY & NEUROLOGY
Payer: MEDICARE

## 2024-11-08 ENCOUNTER — ANESTHESIA (OUTPATIENT)
Dept: BEHAVIORAL HEALTH | Facility: CLINIC | Age: 69
End: 2024-11-08
Payer: MEDICARE

## 2024-11-08 ENCOUNTER — ANESTHESIA EVENT (OUTPATIENT)
Dept: BEHAVIORAL HEALTH | Facility: CLINIC | Age: 69
End: 2024-11-08
Payer: MEDICARE

## 2024-11-08 VITALS
TEMPERATURE: 97 F | RESPIRATION RATE: 18 BRPM | SYSTOLIC BLOOD PRESSURE: 115 MMHG | DIASTOLIC BLOOD PRESSURE: 65 MMHG | HEART RATE: 62 BPM | OXYGEN SATURATION: 92 %

## 2024-11-08 DIAGNOSIS — F33.2 SEVERE RECURRENT MAJOR DEPRESSION WITHOUT PSYCHOTIC FEATURES (H): Primary | ICD-10-CM

## 2024-11-08 PROCEDURE — 99100 ANES PT EXTEME AGE<1 YR&>70: CPT | Performed by: NURSE ANESTHETIST, CERTIFIED REGISTERED

## 2024-11-08 PROCEDURE — 90870 ELECTROCONVULSIVE THERAPY: CPT | Performed by: PSYCHIATRY & NEUROLOGY

## 2024-11-08 PROCEDURE — 90870 ELECTROCONVULSIVE THERAPY: CPT | Performed by: NURSE ANESTHETIST, CERTIFIED REGISTERED

## 2024-11-08 PROCEDURE — 250N000011 HC RX IP 250 OP 636

## 2024-11-08 PROCEDURE — 250N000011 HC RX IP 250 OP 636: Performed by: PSYCHIATRY & NEUROLOGY

## 2024-11-08 PROCEDURE — 370N000017 HC ANESTHESIA TECHNICAL FEE, PER MIN

## 2024-11-08 PROCEDURE — 90870 ELECTROCONVULSIVE THERAPY: CPT

## 2024-11-08 PROCEDURE — 250N000009 HC RX 250

## 2024-11-08 PROCEDURE — 99100 ANES PT EXTEME AGE<1 YR&>70: CPT

## 2024-11-08 RX ORDER — FENTANYL CITRATE 50 UG/ML
25 INJECTION, SOLUTION INTRAMUSCULAR; INTRAVENOUS EVERY 5 MIN PRN
Status: DISCONTINUED | OUTPATIENT
Start: 2024-11-08 | End: 2024-11-09 | Stop reason: HOSPADM

## 2024-11-08 RX ORDER — NICARDIPINE HCL-0.9% SOD CHLOR 1 MG/10 ML
SYRINGE (ML) INTRAVENOUS PRN
Status: DISCONTINUED | OUTPATIENT
Start: 2024-11-08 | End: 2024-11-08

## 2024-11-08 RX ORDER — HYDROMORPHONE HCL IN WATER/PF 6 MG/30 ML
0.4 PATIENT CONTROLLED ANALGESIA SYRINGE INTRAVENOUS EVERY 5 MIN PRN
Status: DISCONTINUED | OUTPATIENT
Start: 2024-11-08 | End: 2024-11-09 | Stop reason: HOSPADM

## 2024-11-08 RX ORDER — DEXAMETHASONE SODIUM PHOSPHATE 4 MG/ML
4 INJECTION, SOLUTION INTRA-ARTICULAR; INTRALESIONAL; INTRAMUSCULAR; INTRAVENOUS; SOFT TISSUE
Status: DISCONTINUED | OUTPATIENT
Start: 2024-11-08 | End: 2024-11-09 | Stop reason: HOSPADM

## 2024-11-08 RX ORDER — HYDROMORPHONE HCL IN WATER/PF 6 MG/30 ML
0.2 PATIENT CONTROLLED ANALGESIA SYRINGE INTRAVENOUS EVERY 5 MIN PRN
Status: DISCONTINUED | OUTPATIENT
Start: 2024-11-08 | End: 2024-11-09 | Stop reason: HOSPADM

## 2024-11-08 RX ORDER — METHOHEXITAL IN WATER/PF 100MG/10ML
SYRINGE (ML) INTRAVENOUS PRN
Status: DISCONTINUED | OUTPATIENT
Start: 2024-11-08 | End: 2024-11-08

## 2024-11-08 RX ORDER — KETOROLAC TROMETHAMINE 30 MG/ML
30 INJECTION, SOLUTION INTRAMUSCULAR; INTRAVENOUS ONCE
Start: 2024-11-08 | End: 2024-11-08

## 2024-11-08 RX ORDER — NALOXONE HYDROCHLORIDE 0.4 MG/ML
0.1 INJECTION, SOLUTION INTRAMUSCULAR; INTRAVENOUS; SUBCUTANEOUS
Status: DISCONTINUED | OUTPATIENT
Start: 2024-11-08 | End: 2024-11-09 | Stop reason: HOSPADM

## 2024-11-08 RX ORDER — ONDANSETRON 2 MG/ML
4 INJECTION INTRAMUSCULAR; INTRAVENOUS EVERY 30 MIN PRN
Status: DISCONTINUED | OUTPATIENT
Start: 2024-11-08 | End: 2024-11-09 | Stop reason: HOSPADM

## 2024-11-08 RX ORDER — FENTANYL CITRATE 50 UG/ML
50 INJECTION, SOLUTION INTRAMUSCULAR; INTRAVENOUS EVERY 5 MIN PRN
Status: DISCONTINUED | OUTPATIENT
Start: 2024-11-08 | End: 2024-11-09 | Stop reason: HOSPADM

## 2024-11-08 RX ORDER — ONDANSETRON 4 MG/1
4 TABLET, ORALLY DISINTEGRATING ORAL EVERY 30 MIN PRN
Status: DISCONTINUED | OUTPATIENT
Start: 2024-11-08 | End: 2024-11-09 | Stop reason: HOSPADM

## 2024-11-08 RX ORDER — OXYCODONE HYDROCHLORIDE 5 MG/1
10 TABLET ORAL
Status: DISCONTINUED | OUTPATIENT
Start: 2024-11-08 | End: 2024-11-09 | Stop reason: HOSPADM

## 2024-11-08 RX ORDER — KETOROLAC TROMETHAMINE 30 MG/ML
30 INJECTION, SOLUTION INTRAMUSCULAR; INTRAVENOUS ONCE
Status: COMPLETED | OUTPATIENT
Start: 2024-11-08 | End: 2024-11-08

## 2024-11-08 RX ORDER — SODIUM CHLORIDE, SODIUM LACTATE, POTASSIUM CHLORIDE, CALCIUM CHLORIDE 600; 310; 30; 20 MG/100ML; MG/100ML; MG/100ML; MG/100ML
INJECTION, SOLUTION INTRAVENOUS CONTINUOUS
Status: DISCONTINUED | OUTPATIENT
Start: 2024-11-08 | End: 2024-11-09 | Stop reason: HOSPADM

## 2024-11-08 RX ORDER — OXYCODONE HYDROCHLORIDE 5 MG/1
5 TABLET ORAL
Status: DISCONTINUED | OUTPATIENT
Start: 2024-11-08 | End: 2024-11-09 | Stop reason: HOSPADM

## 2024-11-08 RX ORDER — LABETALOL HYDROCHLORIDE 5 MG/ML
INJECTION, SOLUTION INTRAVENOUS PRN
Status: DISCONTINUED | OUTPATIENT
Start: 2024-11-08 | End: 2024-11-08

## 2024-11-08 RX ADMIN — Medication 100 MG: at 11:50

## 2024-11-08 RX ADMIN — NICARDIPINE HYDROCHLORIDE 500 MCG: 25 INJECTION INTRAVENOUS at 11:46

## 2024-11-08 RX ADMIN — KETOROLAC TROMETHAMINE 30 MG: 30 INJECTION, SOLUTION INTRAMUSCULAR at 11:36

## 2024-11-08 RX ADMIN — SUCCINYLCHOLINE CHLORIDE 90 MG: 20 INJECTION, SOLUTION INTRAMUSCULAR; INTRAVENOUS; PARENTERAL at 11:44

## 2024-11-08 RX ADMIN — NICARDIPINE HYDROCHLORIDE 200 MCG: 25 INJECTION INTRAVENOUS at 11:47

## 2024-11-08 RX ADMIN — LABETALOL HYDROCHLORIDE 20 MG: 5 INJECTION, SOLUTION INTRAVENOUS at 11:45

## 2024-11-08 ASSESSMENT — COPD QUESTIONNAIRES: COPD: 1
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
## 2024-11-08 NOTE — PROGRESS NOTES
Patients VSS, A/O, IV removed, meets phase 2 criteria and is able  to discharge home at this time. Discharge instructions given to Sidney/ . Pt transported by staff via wheelchair to the car.

## 2024-11-08 NOTE — ANESTHESIA CARE TRANSFER NOTE
Patient: Randi Cleary    Procedure: * No procedures listed *  Electroconvulsive Therapy    Diagnosis: * No pre-op diagnosis entered *  Diagnosis Additional Information: No value filed.    Anesthesia Type:   No value filed.     Note:    Oropharynx: oropharynx clear of all foreign objects and spontaneously breathing  Level of Consciousness: drowsy  Oxygen Supplementation: face mask  Level of Supplemental Oxygen (L/min / FiO2): 8  Independent Airway: airway patency satisfactory and stable  Dentition: dentition unchanged  Vital Signs Stable: post-procedure vital signs reviewed and stable  Report to RN Given: handoff report given  Patient transferred to: PACU    Handoff Report: Identifed the Patient, Identified the Reponsible Provider, Reviewed the pertinent medical history, Discussed the surgical course, Reviewed Intra-OP anesthesia mangement and issues during anesthesia, Set expectations for post-procedure period and Allowed opportunity for questions and acknowledgement of understanding    Vitals:  Vitals Value Taken Time   /90    Temp     Pulse 78    Resp 15    SpO2 96        Electronically Signed By: DION Farr CRNA  November 8, 2024  11:52 AM
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
## 2024-11-08 NOTE — ANESTHESIA POSTPROCEDURE EVALUATION
Patient: Randi Cleary    Procedure: * No procedures listed *  Electroconvulsive Therapy    Anesthesia Type:  General    Note:  Disposition: Inpatient   Postop Pain Control: Uneventful            Sign Out: Well controlled pain   PONV: No   Neuro/Psych: Uneventful            Sign Out: Acceptable/Baseline neuro status   Airway/Respiratory: Uneventful            Sign Out: Acceptable/Baseline resp. status   CV/Hemodynamics: Uneventful            Sign Out: Acceptable CV status; No obvious hypovolemia; No obvious fluid overload   Other NRE: NONE   DID A NON-ROUTINE EVENT OCCUR? No       Last vitals:  Vitals:    11/08/24 1153 11/08/24 1205 11/08/24 1220   BP: (!) 169/83 120/76 115/65   Pulse: 76 67 62   Resp: 20 20 18   Temp: 36.2  C (97.2  F) 36.4  C (97.5  F) 36.1  C (97  F)   SpO2: 96% 95% 92%       Electronically Signed By: Camilo Stroud MD  November 8, 2024  1:38 PM

## 2024-11-08 NOTE — DISCHARGE INSTRUCTIONS
ECT Discharge Instructions      During your ECT series:    Do not drive or work heavy equipment until 7 days after your last treatment.  Do not drink alcohol or use street drugs (illicit drugs) while you are having treatments.  Do not make important decisions, including legal decisions.    After each treatment:    Get plenty of rest. A responsible adult must stay with your for at least 6 hours.  Avoid heavy or risky activities for 24 hours while in maintenance ECT.   Do not drive for at least 24 hours after your treatment while in maintenance ECT.   If you have more than one treatment within one week, do not drive for 7 days after your last treatment.   If you feel light-headed, sit for a few minutes before standing. Have someone help you get up.  If you have nausea (feel sick to your stomach): Drink only clear liquids such as apple juice, ginger ale, broth or 7UP, Be sure to drink plenty of liquids. Move to a normal diet as you feel able.   If you received Toradol, wait 6 hours before taking ibuprofen.  Call your doctor if:   You have a fever over 100F (37.7 C) (taken under the tongue), or a fever that last more than 24 hours.  Your IV site is red, swollen, very painful or is getting more tender.  You have nausea that gets very bad or does not improve.    If you have any symptoms after ECT, tell our staff before your next treatment.  The ECT Department can be reached at [phone].  The ECT Department is open Mondays, Wednesdays and Fridays from 7:00 AM to 2:00 PM.    To speak to a doctor, call:  Your primary care provider or [facility] for Dr. Riley, Dr. Beavers, Dr. Hutchison or Dr. Cotter PHONE: [phone]; fax: [phone]    New instructions:  Discharge instructions:               -- Start your trazodone to support your recovery and ease insomnia              -- Remember to NOT take gabapentin after 6pm the night before each treatment  -- You will have to check to make sure somebody can drive  you to and from the hospital and monitor you for 6 hours after each treatment  -- Maintenance ECT  every two weeks, then to once every month.  The goal of maintenance ECT is to space out and eventually stop  -- During maintenance, you can't drive for 24 hours after each treatment.     - We discussed other alternatives including trying ketamine through the [facility] or staying on your regular medications and therapy, but at the moment you were most interested in pursuing maintenance        MARILYN Cotter MD  Department of Psychiatry and Behavioral Sciences    You have received Toradol today at 1136am . Toradol is an NSAID.  Do not take any NSAID's including: Ibuprofen, Naproxen Sodium, Asprin, Advil, Motrin, Aleve, Shannan, etc., until six hours after the above time which would be 5:36pm . If you have any questions check back with your Physician, or pharmacist.     Covid-19 Testing:  We are no longer requiring covid tests prior to your procedure. If you are ill, please call the ECT department to discuss plan for rescheduling your appointment. [phone]  Please come to the [facility] and check-in with Security before your ECT appointment.  The [facility] entrance is located right next to the Adult Emergency Room.  The address is [address] [place] [address].    After your appointment, you may be picked up at the [facility] entrance, which is located at [address] [place] [address].  [facility], about 1 block North of the [facility].    Transported by:   shira Little    Reviewed AVS discharge instructions with:   shira Little.

## 2024-11-10 NOTE — PROGRESS NOTES
Interventional Psychiatry Program   Treatment Resistant Depression (TRD) Group  [facility], [facility]        Patient: Randi Cleary (1955)    MRN: [id number]  Date of Treatment: November 5, 2024  Duration of Treatment: Start Time: 10:30 am; End Time: 12:00  Providers/Group Facilitators: Arsenio Jiang, PhD, LP; Tiffany Mustafa MA (PhD student)    Number of Participants: 5  Group Format: In Person     People present:   Providers: Arsenio Jiang and Tiffany Mustafa  Patient: Randi Cleary  Others: Other patients    Diagnosis:  Major depressive disorder, severe, recurrent, without psychotic features  Generalized anxiety disorder      Assessment (current symptoms):       10/30/2024     8:45 AM 11/4/2024     2:46 PM 11/6/2024    12:50 PM   PHQ   PHQ-9 Total Score 14  15  15    Q9: Thoughts of better off dead/self-harm past 2 weeks Not at all  Not at all  Not at all        Patient-reported         10/8/2024     6:29 AM 10/22/2024     8:59 AM 11/4/2024     2:38 PM   CHAVO-7 SCORE   Total Score 9 (mild anxiety) 11 (moderate anxiety) 10 (moderate anxiety)   Total Score 9    9 11    11 10        Patient-reported    Multiple values from one day are sorted in reverse-chronological order     The treatment resistant depression (TRD) group is based on the cognitive behavioral therapy model that uses psychoeducation, behavioral activation, mindfulness, supportive techniques, problem solving techniques, and social skills.       Inclusion criteria for group participation: Current patient in the [facility] TRD program; agreement to group format and guidelines discussed in first session     Session 1 Content:  Reviewed group outline including schedule and agreements as well as typical group structure  Collected the BADS-SF  Elective topic: Self-monitoring  Chose scheduled activity for the upcoming week  Distributed and collected feedback forms    Description: Winnie  was engaged in group and made supportive comments of other members as well as self-disclosure that was appropriate and aided the group learning.    Patient's reaction to treatment strategies: Pt was positively engaged in group and helpful to others. Pt shared about dates they will not be available in the upcoming group sessions.    Patient's progress toward treatment goals: Pt expressed commitment to working in group therapy.      Mental Status Exam:  Alertness: alert and oriented  Appearance: well groomed  Behavior/Demeanor: cooperative and pleasant, with good eye contact   Speech: normal  Language: good. Preferred language identified as English.  Psychomotor: normal or unremarkable  Mood: depressed  Affect: restricted; was congruent to mood; was congruent to content  Thought Process/Associations: unremarkable  Perception: Reports intact    Insight: appears appropriate  Judgment: appears appropriate  Cognition: does appear grossly intact      Outpatient Treatment Plan     Today's Date: 11/10/24    Date of 1st group [email or web address]       Diagnosis:  Major depressive disorder, severe, recurrent, without psychotic features  Generalized anxiety disorder    Current symptoms and circumstances that substantiate the diagnosis:  Persistent dysphoria, anhedonia, sleep disturbance, low energy/fatigue, trouble concentrating, and interfering anxiety and irritability.     How symptoms and/or behaviors are affecting level of function:  Reduced social engagement/social isolation; minimal physical activity    Risk Assessment:  Suicide:  Assessed Level of Immediate Risk: Low  Ideation: denied thoughts of suicide or self-harm  Denies self-harm action  Plan:  denies plan for suicide and self-harm  Safety: denied safety concerns     Homicide/Violence:  Assessed Level of Immediate Risk: Low  Ideation: Denies  Plan: n/a  Means: No  Intent: No          SYMPTOMS; PROBLEMS   MEASURABLE GOALS;    FUNCTIONAL IMPROVEMENT INTERVENTIONS Gains  Made:     Depression reduce depressive symptoms, reduce depressive episodes, and report feeling more positive about self   Increase engagement with value-driven activities BA group therpay Pt is seeking to maintain gains     Frequency of Sessions: Weekly    Discharge and Aftercare Goals: see measurable goals above  Expected duration of treatment: 8 weeks  Participants in therapy plan (family, friends, support network): self     Patient verbally consented to the treatment plan above.        Tiffany Mustafa MA  Practicum Therapist    I co-facilitated (and was present for the entire duration of) this group therapy session with the above trainee and discussed this participant in individual supervision. I agree with the note and plan as documented.     Supervisor: Arsenio Jiang, PhD, LP    Answers submitted by the patient for this visit:  Patient Health Questionnaire (Submitted on 11/4/2024)  If you checked off any problems, how difficult have these problems made it for you to do your work, take care of things at home, or get along with other people?: Very difficult  PHQ9 TOTAL SCORE: 15  Patient Health Questionnaire (G7) (Submitted on 11/4/2024)  CHAVO 7 TOTAL SCORE: 10

## 2024-11-14 ENCOUNTER — VIRTUAL VISIT (OUTPATIENT)
Dept: PSYCHOLOGY | Facility: CLINIC | Age: 69
End: 2024-11-14
Payer: MEDICARE

## 2024-11-14 DIAGNOSIS — F41.1 GENERALIZED ANXIETY DISORDER: Primary | ICD-10-CM

## 2024-11-14 DIAGNOSIS — F33.2 MAJOR DEPRESSIVE DISORDER, RECURRENT SEVERE WITHOUT PSYCHOTIC FEATURES (H): ICD-10-CM

## 2024-11-14 PROCEDURE — 90837 PSYTX W PT 60 MINUTES: CPT | Mod: 95 | Performed by: MARRIAGE & FAMILY THERAPIST

## 2024-11-14 ASSESSMENT — PATIENT HEALTH QUESTIONNAIRE - PHQ9
SUM OF ALL RESPONSES TO PHQ QUESTIONS 1-9: 14
10. IF YOU CHECKED OFF ANY PROBLEMS, HOW DIFFICULT HAVE THESE PROBLEMS MADE IT FOR YOU TO DO YOUR WORK, TAKE CARE OF THINGS AT HOME, OR GET ALONG WITH OTHER PEOPLE: SOMEWHAT DIFFICULT
SUM OF ALL RESPONSES TO PHQ QUESTIONS 1-9: 14

## 2024-11-14 NOTE — PROGRESS NOTES
[facility]                                     Progress Note    Patient Name: Randi Cleary  Date: 2024       Service Type: Individual      Session Start Time: 1:30  Session End Time: 2:28     Session Length: 53+    Session #: 8    Attendees: Client    Service Modality:  Video Visit:      Provider verified identity through the following two step process.  Patient provided:  Patient  and Patient address    Telemedicine Visit: The patient's condition can be safely assessed and treated via synchronous audio and visual telemedicine encounter.      Reason for Telemedicine Visit: Patient has requested telehealth visit    Originating Site (Patient Location): Patient's home    Distant Site (Provider Location): Provider Remote Setting- Home Office    Consent:  The patient/guardian has verbally consented to: the potential risks and benefits of telemedicine (video visit) versus in person care; bill my insurance or make self-payment for services provided; and responsibility for payment of non-covered services.     Patient would like the video invitation sent by:  My Chart    Mode of Communication:  Video Conference via Amwell    Distant Location (Provider):  Off-site    As the provider I attest to compliance with applicable laws and regulations related to telemedicine.    DATA  Interactive Complexity: No  Crisis: No        Progress Since Last Session (Related to Symptoms / Goals / Homework):   Symptoms: No change      Homework:  n/a      Episode of Care Goals: Satisfactory progress - ACTION (Actively working towards change); Intervened by reinforcing change plan / affirming steps taken     Current / Ongoing Stressors and Concerns:   Reflection on emotional wellbeing and how finances plays a part.  Historical problems with finances in family and ways the family was not taken care of in ways they expected and deserved.  Concern about side effects of medication.       Treatment Objective(s)  Addressed in This Session:   Distress tolerance     Intervention:   Relationship building.  Validation, encouragement.  Reflecting on emotional experiences, generational cycles.      Assessments completed prior to visit:  The following assessments were completed by patient for this visit:  PHQ9:       10/22/2024     8:57 AM 10/24/2024    10:46 AM 10/28/2024     9:20 PM 10/30/2024     8:45 AM 11/4/2024     2:46 PM 11/6/2024    12:50 PM 11/14/2024    11:26 AM   PHQ-9 SCORE   PHQ-9 Total Score MyChart 14 (Moderate depression) 13 (Moderate depression) 12 (Moderate depression) 14 (Moderate depression) 15 (Moderately severe depression) 15 (Moderately severe depression) 14 (Moderate depression)   PHQ-9 Total Score 14 13  12  14  15  15  14        Patient-reported     GAD7:       7/1/2024    12:10 PM 7/17/2024     9:55 AM 9/9/2024     1:33 PM 9/24/2024     9:54 AM 10/8/2024     6:29 AM 10/22/2024     8:59 AM 11/4/2024     2:38 PM   CHAVO-7 SCORE   Total Score  10 (moderate anxiety) 13 (moderate anxiety) 9 (mild anxiety) 9 (mild anxiety) 11 (moderate anxiety) 10 (moderate anxiety)   Total Score 11 10 13 9 9    9 11    11 10        Patient-reported    Multiple values from one day are sorted in reverse-chronological order     CAGE-AID:       6/22/2020     7:12 PM 1/18/2022    11:15 PM 6/6/2024     8:00 AM   CAGE-AID Total Score   Total Score 4 4 4   Total Score MyChart 4 (A total score of 2 or greater is considered clinically significant) 4 (A total score of 2 or greater is considered clinically significant)      PROMIS 10-Global Health (all questions and answers displayed):       7/17/2024    10:01 AM 10/1/2024    12:02 PM 10/17/2024    12:54 PM 10/24/2024     1:00 PM 10/30/2024     8:48 AM 11/6/2024    12:54 PM 11/14/2024    11:35 AM   PROMIS 10   In general, would you say your health is: Fair    Fair Fair Fair   In general, would you say your quality of life is: Poor    Poor Poor Poor   In general, how would you rate your  physical health? Fair    Poor Good Fair   In general, how would you rate your mental health, including your mood and your ability to think? Fair    Fair Fair Fair   In general, how would you rate your satisfaction with your social activities and relationships? Poor    Fair Poor Poor   In general, please rate how well you carry out your usual social activities and roles Fair    Poor Poor Fair   To what extent are you able to carry out your everyday physical activities such as walking, climbing stairs, carrying groceries, or moving a chair? A little    Moderately Moderately Moderately   In the past 7 days, how often have you been bothered by emotional problems such as feeling anxious, depressed, or irritable? Often    Often Often Often   In the past 7 days, how would you rate your fatigue on average? Moderate    Moderate Moderate Moderate   In the past 7 days, how would you rate your pain on average, where 0 means no pain, and 10 means worst imaginable pain? 7    7 7 7   In general, would you say your health is: 2     2  2  2    In general, would you say your quality of life is: 1     1  1  1    In general, how would you rate your physical health? 2     1  3  2    In general, how would you rate your mental health, including your mood and your ability to think? 2     2  2  2    In general, how would you rate your satisfaction with your social activities and relationships? 1     2  1  1    In general, please rate how well you carry out your usual social activities and roles. (This includes activities at home, at work and in your community, and responsibilities as a parent, child, spouse, employee, friend, etc.) 2     1  1  2    To what extent are you able to carry out your everyday physical activities such as walking, climbing stairs, carrying groceries, or moving a chair? 2     3  3  3    In the past 7 days, how often have you been bothered by emotional problems such as feeling anxious, depressed, or irritable? 4     4  " 4  4    In the past 7 days, how would you rate your fatigue on average? 3     3  3  3    In the past 7 days, how would you rate your pain on average, where 0 means no pain, and 10 means worst imaginable pain? 7     7  7  7    Global Mental Health Score 6    7  6  6    Global Physical Health Score 9    9  11  10    PROMIS TOTAL - SUBSCORES 15    16  17  16        Information is confidential and restricted. Go to Review Flowsheets to unlock data.    Patient-reported     Ada Suicide Severity Rating Scale (Lifetime/Recent)      5/5/2020     9:21 AM 6/11/2020    10:00 AM 1/9/2023     1:00 PM 5/21/2024     4:49 PM 5/21/2024     9:00 PM 6/6/2024     8:00 AM 7/10/2024    12:00 PM   Ada Suicide Severity Rating (Lifetime/Recent)   Q1 Wish to be Dead (Lifetime) Yes Yes   Yes     Comments \"When I was going through a couple of  years of counseling from a dysfunctional family about 30 years ago or more\" when patient was in treatment and there were family concerns   OD on medications     Q2 Non-Specific Active Suicidal Thoughts (Lifetime) Yes Yes   No     Non-Specific Active Suicidal Thought Description (Lifetime) Had thoughts of killing self         Most Severe Ideation Rating (Lifetime) 5 5   5     Most Severe Ideation Description (Lifetime) 35 years ago \"I just wanted to check out , I had thought of it so much and wanted to be done\" when family problems were happening   OD on medication     Frequency (Lifetime) 4 --   3     Duration (Lifetime) 2 --   3     Controllability (Lifetime) 3 0   2     Protective Factors  (Lifetime) 2 5   4     Reasons for Ideation (Lifetime) 5 --   5     Q1 Wished to be Dead (Past Month)   yes 1-->yes 1-->yes 1-->yes    Q2 Suicidal Thoughts (Past Month)   no 1-->yes 1-->yes 1-->yes    Q3 Suicidal Thought Method   no 0-->no 0-->no 1-->yes    Q4 Suicidal Intent without Specific Plan   no 1-->yes 0-->no 0-->no    Q5 Suicide Intent with Specific Plan   no 0-->no 0-->no 1-->yes    Q6 Suicide " Behavior (Lifetime)   yes 1-->yes 0-->no 1-->yes    If yes to Q6, within past 3 months?   no 1-->yes 0-->no 0-->no    Level of Risk per Screen   moderate risk high risk moderate risk high risk    RETIRED: 1. Wish to be Dead (Recent) No No        RETIRED: 2. Non-Specific Active Suicidal Thoughts (Recent) No No        3. Active Suicidal Ideation with any Methods (Not Plan) Without Intent to Act (Lifetime) Yes Yes        RETIRE: Active Suicidal Ideation with any Methods (Not Plan) Description (Lifetime) Took many pills of Prozac and was sent to the ED 30 years prior        RETIRED: 3. Active Suicidal Ideation with any Methods (Not Plan) Without Intent to Act (Recent) No         RETIRE: 4. Active Suicidal Ideation with Some Intent to Act, Without Specific Plan (Lifetime) Yes Yes        RETIRE: Active Suicidal Ideation with Some Intent to Act, Without Specific Plan Description (Lifetime) Took many pills of Prozac and was sent to the ED         4. Active Suicidal Ideation with Some Intent to Act, Without Specific Plan (Recent) No         RETIRE: 5. Active Suicidal Ideation with Specific Plan and Intent (Lifetime) Yes Yes        RETIRE: Active Suicidal Ideation with Specific Plan and Intent Description (Lifetime) Took many pills of Prozac and was sent to the ED over 30 years ago         RETIRED: 5. Active Suicidal Ideation with Specific Plan and Intent (Recent) No         Most Severe Ideation Rating (Past Month) NA         Frequency (Past Month) NA         Duration (Past Month) NA         Controllability (Past Month) NA         Protective Factors (Past Month) NA         Reasons for Ideation (Past Month) NA         Actual Attempt (Lifetime) Yes Yes        Actual Attempt Description (Lifetime) Took a bunch of pills patient reported overdosing on Prozac about thirty years ago        Total Number of Actual Attempts (Lifetime) 1 1        Actual Attempt (Past 3 Months) No No        Has subject engaged in non-suicidal  self-injurious behavior? (Lifetime) Yes Yes        Has subject engaged in non-suicidal self-injurious behavior? (Past 3 Months) No No        Interrupted Attempts (Lifetime) No No        Interrupted Attempts (Past 3 Months) No No        Aborted or Self-Interrupted Attempt (Lifetime) No No        Total Number Aborted or Self Interrupted Attempts (Lifetime) 0         Aborted or Self-Interrupted Attempt (Past 3 Months) No No        Preparatory Acts or Behavior (Lifetime) No No        Preparatory Acts or Behavior (Past 3 Months) No No        Most Recent Attempt Date 10/1/1984 --        Comments  30 years prior        Most Recent Attempt Actual Lethality Code 2 0        Comments This is a guess/pt. doesn't recall exact month or day         Most Lethal Attempt Actual Lethality Code 2         Comments only 1 attempt/same attempt as most recent & initial         Initial/First Attempt Date 10/1/1984         Initial/First Attempt Actual Lethality Code 2         Q1 Wish to be Dead (Lifetime)       N   Q2 Non-Specific Active Suicidal Thoughts (Lifetime)       N         ASSESSMENT: Current Emotional / Mental Status (status of significant symptoms):   Risk status (Self / Other harm or suicidal ideation)   Patient denies current fears or concerns for personal safety.   Patient reports the following current or recent suicidal ideation or behaviors: passive ideation of wanting to escape the struggles, no plan, no intent.   Patient denies current or recent homicidal ideation or behaviors.   Patient denies current or recent self injurious behavior or ideation.   Patient denies other safety concerns.   Patient reports there has been no change in risk factors since their last session.     Patient reports there has been no change in protective factors since their last session.     A safety and risk management plan has been developed including: Patient consented to co-developed safety plan on 6/4/24.  Safety and risk management plan was  reviewed.   Patient agreed to use safety plan should any safety concerns arise.  A copy was made available to the patient.     Appearance:   Appropriate    Eye Contact:   Good    Psychomotor Behavior: Normal    Attitude:   Cooperative  Pleasant   Orientation:   All   Speech    Rate / Production: Slow     Volume:  Normal    Mood:    Normal   Affect:    Appropriate  Subdued    Thought Content:  Clear    Thought Form:  Coherent  Logical    Insight:    Good      Medication Review:   No changes to current psychiatric medication(s)     Medication Compliance:   Yes     Changes in Health Issues:   None reported     Chemical Use Review:   Substance Use: Chemical use reviewed, no active concerns identified      Tobacco Use: No current tobacco use.      Diagnosis:  1. Generalized anxiety disorder    2. Major depressive disorder, recurrent severe without psychotic features (H)          Collateral Reports Completed:   Not Applicable    PLAN: (Patient Tasks / Therapist Tasks / Other)  Focus on self-care, distress tolerance.  Message psychiatry about her concern regarding side effects.  Plan to get to pool to swim next week.  She plans to tell story next week about seventh grade hair.      Erika Colorado, LMFT                                                         ______________________________________________________________________    Individual Treatment Plan    Patient's Name: Randi Cleary  YOB: 1955    Date of Creation: 7/17/2024  Date Treatment Plan Last Reviewed/Revised: 7/17/2024, 10/30/24     DSM5 Diagnoses:   296.33 (F33.2) Major Depressive Disorder, Recurrent Episode, Severe   300.02 (F41.1) Generalized Anxiety Disorder  Psychosocial / Contextual Factors: history of trauma  PROMIS (reviewed every 90 days):     Referral / Collaboration:  Discussed and patient will pursue a therapy group for depressive symptoms.    Anticipated number of session for this episode of care: 9-12  sessions  Anticipation frequency of session: Weekly  Anticipated Duration of each session: 38-52 minutes  Treatment plan will be reviewed in 90 days or when goals have been changed.       MeasurableTreatment Goal(s) related to diagnosis / functional impairment(s)  Goal 1: Client will keep self safe and eliminate suicidal ideation and self-harm behaviors.    Objective #A (Client Action)    Client will make a list of at least 10 skills or activities that you will to use to distract from urges to harm self.  Status: Completed - Date: 10/30/24       Intervention(s)  Therapist will provide ideas and strategies for generating coping skills list.    Objective #B  Client will make a list of pros and cons for tolerating and not tolerating an urge to harm self.  Status: Continued - Date(s):10/30/24      Intervention(s)  Therapist will guide client through DBT-style Pros/Cons list for behavior change.    Objective #C  Client will identify and practice the new strategies for dealing with strong emotions, learn and practice relaxation breathing.  Status: Continued - Date(s):10/30/24      Intervention(s)  Therapist will teach distraction skills. Practice them within session and outside of session.    Goal 2: Client will report reduction in anxiety also as evidenced by reduction of CHAVO-7 score below 6 points within the next 12 weeks.    Objective #A (Client Action)    Client will identify at least three triggers for anxiety.  Status: Completed - Date: 10/30/24       Intervention(s)  Therapist will provide educational materials on common triggers and signs of anxiety.    Objective #B  Client will use relaxation strategies at least two times per day to reduce the physical symptoms of anxiety.  Status: Continued - Date(s):10/30/24      Intervention(s)  Therapist will teach relaxation strategies such as mindfulness, deep breathing, muscle relaxation, and sensory activities.    Objective #C  Client will use cognitive strategies  identified in therapy to challenge anxious thoughts.  Status: Continued - Date(s):10/30/24      Intervention(s)  Therapist will teach strategies for cognitive modification using REBT model.    Goal 3: Client will report improved mood as indicated by PHQ-9 score below 6 for consistent 8 weeks.    Objective #A (Client Action)    Status: Continued - Date: 10/30/24     Client will Increase interest, engagement, and pleasure in doing things.    Intervention(s)  Therapist will assign homework for daily involvement in pleasant activities.    Objective #B  Client will Identify negative self-talk and behaviors: challenge core beliefs, myths, and actions.    Status: Continued - Date(s):10/30/24      Intervention(s)  Therapist will teach strategies for cognitive modification using REBT models.    Objective #C  Client will Improve quantity and quality of night time sleep / decrease daytime naps.  Status: Continued - Date(s):10/30/24      Intervention(s)  Therapist will teach sleep hygiene strategies.  Assign homework for daily practice.    Goal 4: Client will report reduced or eliminated physiological activation when recalling a distressing event including decreased re-experiencing, decreased avoidance, and decreased hyperarousal.    Objective #A (Client Action)    Status:  Continued: 10/30/24       Client will acquire knowledge of trauma, common symptoms post-trauma, emotion regulation strategies, stress management strategies, and cognitive coping strategies.    Intervention(s)  Therapist will teach about effects of trauma on mind and body; encourage emotion identification and expression; practice with client the stress management strategies; and teach cognitive modification.    Objective #B  Client will use Brainspotting while focusing on physiological sensations related to distressing events.  Client will assess and rate level of physiological activation.  Status: Continued - Date(s):10/30/24     Intervention(s)  Therapist  will provide use a pointer or other materials to assist client in holding a brainspot in their visual field.  Therapist might also provide biolateral music through headphones to deepen the experience.         Patient has reviewed and agreed to the above plan.      Erika Colorado, LMFT  July 17, 2024

## 2024-11-19 ENCOUNTER — TELEPHONE (OUTPATIENT)
Dept: PSYCHIATRY | Facility: CLINIC | Age: 69
End: 2024-11-19

## 2024-11-19 ENCOUNTER — TELEPHONE (OUTPATIENT)
Dept: BEHAVIORAL HEALTH | Facility: CLINIC | Age: 69
End: 2024-11-19
Payer: MEDICARE

## 2024-11-19 NOTE — TELEPHONE ENCOUNTER
Writer spoke with pt and scheduled TC return visits for patient as requested.    Jacqueline Givens  11/19/2024  [phone]

## 2024-11-19 NOTE — TELEPHONE ENCOUNTER
Returned call at pt's request. Pt explained why they missed group today and last week (cold and  needing to drive to work). Writer thanked pt for calling and reminded pt no group next week (11.26) but we meet the following week (12.03).    Arsenio Jiang, PhD LP on 11/19/2024 at 1:09 PM

## 2024-11-21 ENCOUNTER — VIRTUAL VISIT (OUTPATIENT)
Dept: PSYCHOLOGY | Facility: CLINIC | Age: 69
End: 2024-11-21
Payer: MEDICARE

## 2024-11-21 DIAGNOSIS — F41.1 GENERALIZED ANXIETY DISORDER: Primary | ICD-10-CM

## 2024-11-21 DIAGNOSIS — F33.2 MAJOR DEPRESSIVE DISORDER, RECURRENT SEVERE WITHOUT PSYCHOTIC FEATURES (H): ICD-10-CM

## 2024-11-21 NOTE — PROGRESS NOTES
[facility]                                     Progress Note    Patient Name: Randi Cleary  Date: 2024       Service Type: Individual      Session Start Time: 12:30  Session End Time: 1:28     Session Length: 53+    Session #: 9    Attendees: Client    Service Modality:  Video Visit:      Provider verified identity through the following two step process.  Patient provided:  Patient  and Patient address    Telemedicine Visit: The patient's condition can be safely assessed and treated via synchronous audio and visual telemedicine encounter.      Reason for Telemedicine Visit: Patient has requested telehealth visit    Originating Site (Patient Location): Patient's home    Distant Site (Provider Location): Provider Remote Setting- Home Office    Consent:  The patient/guardian has verbally consented to: the potential risks and benefits of telemedicine (video visit) versus in person care; bill my insurance or make self-payment for services provided; and responsibility for payment of non-covered services.     Patient would like the video invitation sent by:  My Chart    Mode of Communication:  Video Conference via [employer]    Distant Location (Provider):  Off-site    As the provider I attest to compliance with applicable laws and regulations related to telemedicine.    DATA  Interactive Complexity: No  Crisis: No        Progress Since Last Session (Related to Symptoms / Goals / Homework):   Symptoms: No change      Homework:  n/a      Episode of Care Goals: Satisfactory progress - ACTION (Actively working towards change); Intervened by reinforcing change plan / affirming steps taken     Current / Ongoing Stressors and Concerns:   Financial strain, prescription costs have increased significantly.  Long-term friend discontinued their tradition of spending An together.  Feeling hurt and angry about the change.  Decreased appetite and continuing to lose weight.       Treatment  Objective(s) Addressed in This Session:   Distress tolerance     Intervention:   Relationship building.  Validation, encouragement.  Reflecting on emotional experiences, generational cycles.      Assessments completed prior to visit:  The following assessments were completed by patient for this visit:  PHQ9:       10/24/2024    10:46 AM 10/28/2024     9:20 PM 10/30/2024     8:45 AM 11/4/2024     2:46 PM 11/6/2024    12:50 PM 11/14/2024    11:26 AM 11/21/2024     8:31 AM   PHQ-9 SCORE   PHQ-9 Total Score MyChart 13 (Moderate depression) 12 (Moderate depression) 14 (Moderate depression) 15 (Moderately severe depression) 15 (Moderately severe depression) 14 (Moderate depression) 16 (Moderately severe depression)   PHQ-9 Total Score 13  12  14  15  15  14  16        Patient-reported     GAD7:       7/1/2024    12:10 PM 7/17/2024     9:55 AM 9/9/2024     1:33 PM 9/24/2024     9:54 AM 10/8/2024     6:29 AM 10/22/2024     8:59 AM 11/4/2024     2:38 PM   CHAVO-7 SCORE   Total Score  10 (moderate anxiety) 13 (moderate anxiety) 9 (mild anxiety) 9 (mild anxiety) 11 (moderate anxiety) 10 (moderate anxiety)   Total Score 11 10 13 9 9    9 11    11 10        Patient-reported    Multiple values from one day are sorted in reverse-chronological order     CAGE-AID:       6/22/2020     7:12 PM 1/18/2022    11:15 PM 6/6/2024     8:00 AM   CAGE-AID Total Score   Total Score 4 4 4   Total Score MyChart 4 (A total score of 2 or greater is considered clinically significant) 4 (A total score of 2 or greater is considered clinically significant)      PROMIS 10-Global Health (all questions and answers displayed):       7/17/2024    10:01 AM 10/1/2024    12:02 PM 10/17/2024    12:54 PM 10/24/2024     1:00 PM 10/30/2024     8:48 AM 11/6/2024    12:54 PM 11/14/2024    11:35 AM   PROMIS 10   In general, would you say your health is: Fair    Fair Fair Fair   In general, would you say your quality of life is: Poor    Poor Poor Poor   In general, how  would you rate your physical health? Fair    Poor Good Fair   In general, how would you rate your mental health, including your mood and your ability to think? Fair    Fair Fair Fair   In general, how would you rate your satisfaction with your social activities and relationships? Poor    Fair Poor Poor   In general, please rate how well you carry out your usual social activities and roles Fair    Poor Poor Fair   To what extent are you able to carry out your everyday physical activities such as walking, climbing stairs, carrying groceries, or moving a chair? A little    Moderately Moderately Moderately   In the past 7 days, how often have you been bothered by emotional problems such as feeling anxious, depressed, or irritable? Often    Often Often Often   In the past 7 days, how would you rate your fatigue on average? Moderate    Moderate Moderate Moderate   In the past 7 days, how would you rate your pain on average, where 0 means no pain, and 10 means worst imaginable pain? 7    7 7 7   In general, would you say your health is: 2     2  2  2    In general, would you say your quality of life is: 1     1  1  1    In general, how would you rate your physical health? 2     1  3  2    In general, how would you rate your mental health, including your mood and your ability to think? 2     2  2  2    In general, how would you rate your satisfaction with your social activities and relationships? 1     2  1  1    In general, please rate how well you carry out your usual social activities and roles. (This includes activities at home, at work and in your community, and responsibilities as a parent, child, spouse, employee, friend, etc.) 2     1  1  2    To what extent are you able to carry out your everyday physical activities such as walking, climbing stairs, carrying groceries, or moving a chair? 2     3  3  3    In the past 7 days, how often have you been bothered by emotional problems such as feeling anxious, depressed,  "or irritable? 4     4  4  4    In the past 7 days, how would you rate your fatigue on average? 3     3  3  3    In the past 7 days, how would you rate your pain on average, where 0 means no pain, and 10 means worst imaginable pain? 7     7  7  7    Global Mental Health Score 6    7  6  6    Global Physical Health Score 9    9  11  10    PROMIS TOTAL - SUBSCORES 15    16  17  16        Information is confidential and restricted. Go to Review Flowsheets to unlock data.    Patient-reported     [place] Suicide Severity Rating Scale (Lifetime/Recent)      5/5/2020     9:21 AM 6/11/2020    10:00 AM 1/9/2023     1:00 PM 5/21/2024     4:49 PM 5/21/2024     9:00 PM 6/6/2024     8:00 AM 7/10/2024    12:00 PM   [place] Suicide Severity Rating (Lifetime/Recent)   Q1 Wish to be Dead (Lifetime) Yes Yes   Yes     Comments \"When I was going through a couple of  years of counseling from a dysfunctional family about 30 years ago or more\" when patient was in treatment and there were family concerns   OD on medications     Q2 Non-Specific Active Suicidal Thoughts (Lifetime) Yes Yes   No     Non-Specific Active Suicidal Thought Description (Lifetime) Had thoughts of killing self         Most Severe Ideation Rating (Lifetime) 5 5   5     Most Severe Ideation Description (Lifetime) 35 years ago \"I just wanted to check out , I had thought of it so much and wanted to be done\" when family problems were happening   OD on medication     Frequency (Lifetime) 4 --   3     Duration (Lifetime) 2 --   3     Controllability (Lifetime) 3 0   2     Protective Factors  (Lifetime) 2 5   4     Reasons for Ideation (Lifetime) 5 --   5     Q1 Wished to be Dead (Past Month)   yes 1-->yes 1-->yes 1-->yes    Q2 Suicidal Thoughts (Past Month)   no 1-->yes 1-->yes 1-->yes    Q3 Suicidal Thought Method   no 0-->no 0-->no 1-->yes    Q4 Suicidal Intent without Specific Plan   no 1-->yes 0-->no 0-->no    Q5 Suicide Intent with Specific Plan   no 0-->no 0-->no " 1-->yes    Q6 Suicide Behavior (Lifetime)   yes 1-->yes 0-->no 1-->yes    If yes to Q6, within past 3 months?   no 1-->yes 0-->no 0-->no    Level of Risk per Screen   moderate risk high risk moderate risk high risk    RETIRED: 1. Wish to be Dead (Recent) No No        RETIRED: 2. Non-Specific Active Suicidal Thoughts (Recent) No No        3. Active Suicidal Ideation with any Methods (Not Plan) Without Intent to Act (Lifetime) Yes Yes        RETIRE: Active Suicidal Ideation with any Methods (Not Plan) Description (Lifetime) Took many pills of Prozac and was sent to the ED 30 years prior        RETIRED: 3. Active Suicidal Ideation with any Methods (Not Plan) Without Intent to Act (Recent) No         RETIRE: 4. Active Suicidal Ideation with Some Intent to Act, Without Specific Plan (Lifetime) Yes Yes        RETIRE: Active Suicidal Ideation with Some Intent to Act, Without Specific Plan Description (Lifetime) Took many pills of Prozac and was sent to the ED         4. Active Suicidal Ideation with Some Intent to Act, Without Specific Plan (Recent) No         RETIRE: 5. Active Suicidal Ideation with Specific Plan and Intent (Lifetime) Yes Yes        RETIRE: Active Suicidal Ideation with Specific Plan and Intent Description (Lifetime) Took many pills of Prozac and was sent to the ED over 30 years ago         RETIRED: 5. Active Suicidal Ideation with Specific Plan and Intent (Recent) No         Most Severe Ideation Rating (Past Month) NA         Frequency (Past Month) NA         Duration (Past Month) NA         Controllability (Past Month) NA         Protective Factors (Past Month) NA         Reasons for Ideation (Past Month) NA         Actual Attempt (Lifetime) Yes Yes        Actual Attempt Description (Lifetime) Took a bunch of pills patient reported overdosing on Prozac about thirty years ago        Total Number of Actual Attempts (Lifetime) 1 1        Actual Attempt (Past 3 Months) No No        Has subject engaged in  non-suicidal self-injurious behavior? (Lifetime) Yes Yes        Has subject engaged in non-suicidal self-injurious behavior? (Past 3 Months) No No        Interrupted Attempts (Lifetime) No No        Interrupted Attempts (Past 3 Months) No No        Aborted or Self-Interrupted Attempt (Lifetime) No No        Total Number Aborted or Self Interrupted Attempts (Lifetime) 0         Aborted or Self-Interrupted Attempt (Past 3 Months) No No        Preparatory Acts or Behavior (Lifetime) No No        Preparatory Acts or Behavior (Past 3 Months) No No        Most Recent Attempt Date 10/1/1984 --        Comments  30 years prior        Most Recent Attempt Actual Lethality Code 2 0        Comments This is a guess/pt. doesn't recall exact month or day         Most Lethal Attempt Actual Lethality Code 2         Comments only 1 attempt/same attempt as most recent & initial         Initial/First Attempt Date 10/1/1984         Initial/First Attempt Actual Lethality Code 2         Q1 Wish to be Dead (Lifetime)       N   Q2 Non-Specific Active Suicidal Thoughts (Lifetime)       N         ASSESSMENT: Current Emotional / Mental Status (status of significant symptoms):   Risk status (Self / Other harm or suicidal ideation)   Patient denies current fears or concerns for personal safety.   Patient reports the following current or recent suicidal ideation or behaviors: passive ideation of wanting to escape the struggles, no plan, no intent.   Patient denies current or recent homicidal ideation or behaviors.   Patient denies current or recent self injurious behavior or ideation.   Patient denies other safety concerns.   Patient reports there has been no change in risk factors since their last session.     Patient reports there has been no change in protective factors since their last session.     A safety and risk management plan has been developed including: Patient consented to co-developed safety plan on 6/4/24.  Safety and risk  management plan was reviewed.   Patient agreed to use safety plan should any safety concerns arise.  A copy was made available to the patient.     Appearance:   Appropriate    Eye Contact:   Good    Psychomotor Behavior: Normal    Attitude:   Cooperative  Pleasant   Orientation:   All   Speech    Rate / Production: Slow     Volume:  Normal    Mood:    Normal   Affect:    Appropriate  Subdued    Thought Content:  Clear    Thought Form:  Coherent  Logical    Insight:    Good      Medication Review:   No changes to current psychiatric medication(s)     Medication Compliance:   Yes     Changes in Health Issues:   None reported     Chemical Use Review:   Substance Use: Chemical use reviewed, no active concerns identified      Tobacco Use: No current tobacco use.      Diagnosis:  1. Generalized anxiety disorder    2. Major depressive disorder, recurrent severe without psychotic features (H)            Collateral Reports Completed:   Not Applicable    PLAN: (Patient Tasks / Therapist Tasks / Other)  Focus on self-care, distress tolerance.        EWELINA Billingsley                                                         ______________________________________________________________________    Individual Treatment Plan    Patient's Name: Randi Cleary  YOB: 1955    Date of Creation: 7/17/2024  Date Treatment Plan Last Reviewed/Revised: 7/17/2024, 10/30/24     DSM5 Diagnoses:   296.33 (F33.2) Major Depressive Disorder, Recurrent Episode, Severe   300.02 (F41.1) Generalized Anxiety Disorder  Psychosocial / Contextual Factors: history of trauma  PROMIS (reviewed every 90 days):     Referral / Collaboration:  Discussed and patient will pursue a therapy group for depressive symptoms.    Anticipated number of session for this episode of care: 9-12 sessions  Anticipation frequency of session: Weekly  Anticipated Duration of each session: 38-52 minutes  Treatment plan will be reviewed in 90 days or when  goals have been changed.       MeasurableTreatment Goal(s) related to diagnosis / functional impairment(s)  Goal 1: Client will keep self safe and eliminate suicidal ideation and self-harm behaviors.    Objective #A (Client Action)    Client will make a list of at least 10 skills or activities that you will to use to distract from urges to harm self.  Status: Completed - Date: 10/30/24       Intervention(s)  Therapist will provide ideas and strategies for generating coping skills list.    Objective #B  Client will make a list of pros and cons for tolerating and not tolerating an urge to harm self.  Status: Continued - Date(s):10/30/24      Intervention(s)  Therapist will guide client through DBT-style Pros/Cons list for behavior change.    Objective #C  Client will identify and practice the new strategies for dealing with strong emotions, learn and practice relaxation breathing.  Status: Continued - Date(s):10/30/24      Intervention(s)  Therapist will teach distraction skills. Practice them within session and outside of session.    Goal 2: Client will report reduction in anxiety also as evidenced by reduction of CHAVO-7 score below 6 points within the next 12 weeks.    Objective #A (Client Action)    Client will identify at least three triggers for anxiety.  Status: Completed - Date: 10/30/24       Intervention(s)  Therapist will provide educational materials on common triggers and signs of anxiety.    Objective #B  Client will use relaxation strategies at least two times per day to reduce the physical symptoms of anxiety.  Status: Continued - Date(s):10/30/24      Intervention(s)  Therapist will teach relaxation strategies such as mindfulness, deep breathing, muscle relaxation, and sensory activities.    Objective #C  Client will use cognitive strategies identified in therapy to challenge anxious thoughts.  Status: Continued - Date(s):10/30/24      Intervention(s)  Therapist will teach strategies for cognitive  modification using REBT model.    Goal 3: Client will report improved mood as indicated by PHQ-9 score below 6 for consistent 8 weeks.    Objective #A (Client Action)    Status: Continued - Date: 10/30/24     Client will Increase interest, engagement, and pleasure in doing things.    Intervention(s)  Therapist will assign homework for daily involvement in pleasant activities.    Objective #B  Client will Identify negative self-talk and behaviors: challenge core beliefs, myths, and actions.    Status: Continued - Date(s):10/30/24      Intervention(s)  Therapist will teach strategies for cognitive modification using REBT models.    Objective #C  Client will Improve quantity and quality of night time sleep / decrease daytime naps.  Status: Continued - Date(s):10/30/24      Intervention(s)  Therapist will teach sleep hygiene strategies.  Assign homework for daily practice.    Goal 4: Client will report reduced or eliminated physiological activation when recalling a distressing event including decreased re-experiencing, decreased avoidance, and decreased hyperarousal.    Objective #A (Client Action)    Status:  Continued: 10/30/24       Client will acquire knowledge of trauma, common symptoms post-trauma, emotion regulation strategies, stress management strategies, and cognitive coping strategies.    Intervention(s)  Therapist will teach about effects of trauma on mind and body; encourage emotion identification and expression; practice with client the stress management strategies; and teach cognitive modification.    Objective #B  Client will use Brainspotting while focusing on physiological sensations related to distressing events.  Client will assess and rate level of physiological activation.  Status: Continued - Date(s):10/30/24     Intervention(s)  Therapist will provide use a pointer or other materials to assist client in holding a brainspot in their visual field.  Therapist might also provide biolateral music  through headphones to deepen the experience.         Patient has reviewed and agreed to the above plan.      Erika Colorado, LMFT  July 17, 2024

## 2024-11-22 ENCOUNTER — HOSPITAL ENCOUNTER (OUTPATIENT)
Dept: BEHAVIORAL HEALTH | Facility: CLINIC | Age: 69
Discharge: HOME | End: 2024-11-22
Attending: STUDENT IN AN ORGANIZED HEALTH CARE EDUCATION/TRAINING PROGRAM
Payer: MEDICARE

## 2024-11-22 ENCOUNTER — HOSPITAL ENCOUNTER (OUTPATIENT)
Facility: CLINIC | Age: 69
Setting detail: OBSERVATION
Discharge: HOME | End: 2024-11-23
Attending: FAMILY MEDICINE | Admitting: INTERNAL MEDICINE
Payer: MEDICARE

## 2024-11-22 VITALS
RESPIRATION RATE: 14 BRPM | OXYGEN SATURATION: 93 % | HEART RATE: 69 BPM | DIASTOLIC BLOOD PRESSURE: 64 MMHG | SYSTOLIC BLOOD PRESSURE: 94 MMHG | TEMPERATURE: 97.1 F

## 2024-11-22 DIAGNOSIS — F33.2 SEVERE RECURRENT MAJOR DEPRESSION WITHOUT PSYCHOTIC FEATURES (H): Primary | ICD-10-CM

## 2024-11-22 DIAGNOSIS — I48.91 ATRIAL FIBRILLATION, UNSPECIFIED TYPE (H): ICD-10-CM

## 2024-11-22 DIAGNOSIS — M10.9 URIC ACID ARTHROPATHY: ICD-10-CM

## 2024-11-22 DIAGNOSIS — M15.0 PRIMARY OSTEOARTHRITIS INVOLVING MULTIPLE JOINTS: ICD-10-CM

## 2024-11-22 DIAGNOSIS — I48.0 PAROXYSMAL ATRIAL FIBRILLATION (H): Primary | ICD-10-CM

## 2024-11-22 DIAGNOSIS — E89.0 POSTABLATIVE HYPOTHYROIDISM: ICD-10-CM

## 2024-11-22 DIAGNOSIS — F11.90 CHRONIC, CONTINUOUS USE OF OPIOIDS: ICD-10-CM

## 2024-11-22 DIAGNOSIS — R20.0 NUMBNESS IN BOTH HANDS: ICD-10-CM

## 2024-11-22 DIAGNOSIS — K44.9 HIATAL HERNIA: ICD-10-CM

## 2024-11-22 DIAGNOSIS — Z98.890 STATUS POST ELECTROCONVULSIVE THERAPY: ICD-10-CM

## 2024-11-22 DIAGNOSIS — F33.2 SEVERE EPISODE OF RECURRENT MAJOR DEPRESSIVE DISORDER, WITHOUT PSYCHOTIC FEATURES (H): ICD-10-CM

## 2024-11-22 LAB
ALBUMIN SERPL BCG-MCNC: 3.6 G/DL (ref 3.5–5.2)
ALP SERPL-CCNC: 65 U/L (ref 40–150)
ALT SERPL W P-5'-P-CCNC: 11 U/L (ref 0–50)
ANION GAP SERPL CALCULATED.3IONS-SCNC: 12 MMOL/L (ref 7–15)
AST SERPL W P-5'-P-CCNC: 17 U/L (ref 0–45)
ATRIAL RATE - MUSE: 52 BPM
BASOPHILS # BLD AUTO: 0 10E3/UL (ref 0–0.2)
BASOPHILS NFR BLD AUTO: 0 %
BILIRUB SERPL-MCNC: 0.3 MG/DL
BUN SERPL-MCNC: 18.5 MG/DL (ref 8–23)
CALCIUM SERPL-MCNC: 8.7 MG/DL (ref 8.8–10.4)
CHLORIDE SERPL-SCNC: 107 MMOL/L (ref 98–107)
CREAT SERPL-MCNC: 0.6 MG/DL (ref 0.51–0.95)
DIASTOLIC BLOOD PRESSURE - MUSE: NORMAL MMHG
EGFRCR SERPLBLD CKD-EPI 2021: >90 ML/MIN/1.73M2
EOSINOPHIL # BLD AUTO: 0.1 10E3/UL (ref 0–0.7)
EOSINOPHIL NFR BLD AUTO: 1 %
ERYTHROCYTE [DISTWIDTH] IN BLOOD BY AUTOMATED COUNT: 12.4 % (ref 10–15)
FERRITIN SERPL-MCNC: 139 NG/ML (ref 11–328)
GLUCOSE SERPL-MCNC: 114 MG/DL (ref 70–99)
HCO3 SERPL-SCNC: 21 MMOL/L (ref 22–29)
HCT VFR BLD AUTO: 47 % (ref 35–47)
HGB BLD-MCNC: 16.9 G/DL (ref 11.7–15.7)
IMM GRANULOCYTES # BLD: 0 10E3/UL
IMM GRANULOCYTES NFR BLD: 0 %
INTERPRETATION ECG - MUSE: NORMAL
LYMPHOCYTES # BLD AUTO: 1.2 10E3/UL (ref 0.8–5.3)
LYMPHOCYTES NFR BLD AUTO: 15 %
MAGNESIUM SERPL-MCNC: 1.9 MG/DL (ref 1.7–2.3)
MCH RBC QN AUTO: 33.5 PG (ref 26.5–33)
MCHC RBC AUTO-ENTMCNC: 36 G/DL (ref 31.5–36.5)
MCV RBC AUTO: 93 FL (ref 78–100)
MONOCYTES # BLD AUTO: 0.7 10E3/UL (ref 0–1.3)
MONOCYTES NFR BLD AUTO: 9 %
NEUTROPHILS # BLD AUTO: 5.8 10E3/UL (ref 1.6–8.3)
NEUTROPHILS NFR BLD AUTO: 75 %
NRBC # BLD AUTO: 0 10E3/UL
NRBC BLD AUTO-RTO: 0 /100
NT-PROBNP SERPL-MCNC: 522 PG/ML (ref 0–900)
P AXIS - MUSE: NORMAL DEGREES
PLATELET # BLD AUTO: 188 10E3/UL (ref 150–450)
POTASSIUM SERPL-SCNC: 3.6 MMOL/L (ref 3.4–5.3)
PR INTERVAL - MUSE: NORMAL MS
PROT SERPL-MCNC: 5.9 G/DL (ref 6.4–8.3)
QRS DURATION - MUSE: 118 MS
QT - MUSE: 410 MS
QTC - MUSE: 445 MS
R AXIS - MUSE: 49 DEGREES
RBC # BLD AUTO: 5.04 10E6/UL (ref 3.8–5.2)
SODIUM SERPL-SCNC: 140 MMOL/L (ref 135–145)
SYSTOLIC BLOOD PRESSURE - MUSE: NORMAL MMHG
T AXIS - MUSE: 38 DEGREES
TROPONIN T SERPL HS-MCNC: 16 NG/L
TROPONIN T SERPL HS-MCNC: 16 NG/L
TSH SERPL DL<=0.005 MIU/L-ACNC: 0.4 UIU/ML (ref 0.3–4.2)
VENTRICULAR RATE- MUSE: 71 BPM
WBC # BLD AUTO: 7.8 10E3/UL (ref 4–11)

## 2024-11-22 PROCEDURE — 99285 EMERGENCY DEPT VISIT HI MDM: CPT | Performed by: FAMILY MEDICINE

## 2024-11-22 PROCEDURE — 83735 ASSAY OF MAGNESIUM: CPT

## 2024-11-22 PROCEDURE — 96372 THER/PROPH/DIAG INJ SC/IM: CPT

## 2024-11-22 PROCEDURE — 90870 ELECTROCONVULSIVE THERAPY: CPT | Performed by: STUDENT IN AN ORGANIZED HEALTH CARE EDUCATION/TRAINING PROGRAM

## 2024-11-22 PROCEDURE — 90870 ELECTROCONVULSIVE THERAPY: CPT

## 2024-11-22 PROCEDURE — 36415 COLL VENOUS BLD VENIPUNCTURE: CPT | Performed by: FAMILY MEDICINE

## 2024-11-22 PROCEDURE — 99222 1ST HOSP IP/OBS MODERATE 55: CPT | Mod: FS | Performed by: INTERNAL MEDICINE

## 2024-11-22 PROCEDURE — 96360 HYDRATION IV INFUSION INIT: CPT | Performed by: FAMILY MEDICINE

## 2024-11-22 PROCEDURE — 84484 ASSAY OF TROPONIN QUANT: CPT | Performed by: FAMILY MEDICINE

## 2024-11-22 PROCEDURE — 250N000011 HC RX IP 250 OP 636

## 2024-11-22 PROCEDURE — 370N000017 HC ANESTHESIA TECHNICAL FEE, PER MIN: Performed by: STUDENT IN AN ORGANIZED HEALTH CARE EDUCATION/TRAINING PROGRAM

## 2024-11-22 PROCEDURE — 93010 ELECTROCARDIOGRAM REPORT: CPT | Performed by: FAMILY MEDICINE

## 2024-11-22 PROCEDURE — 258N000003 HC RX IP 258 OP 636: Performed by: FAMILY MEDICINE

## 2024-11-22 PROCEDURE — 83880 ASSAY OF NATRIURETIC PEPTIDE: CPT | Performed by: FAMILY MEDICINE

## 2024-11-22 PROCEDURE — 120N000002 HC R&B MED SURG/OB UMMC

## 2024-11-22 PROCEDURE — 93005 ELECTROCARDIOGRAM TRACING: CPT | Performed by: FAMILY MEDICINE

## 2024-11-22 PROCEDURE — 82374 ASSAY BLOOD CARBON DIOXIDE: CPT | Performed by: FAMILY MEDICINE

## 2024-11-22 PROCEDURE — 82728 ASSAY OF FERRITIN: CPT

## 2024-11-22 PROCEDURE — 85025 COMPLETE CBC W/AUTO DIFF WBC: CPT | Performed by: FAMILY MEDICINE

## 2024-11-22 PROCEDURE — 82565 ASSAY OF CREATININE: CPT | Performed by: FAMILY MEDICINE

## 2024-11-22 PROCEDURE — 250N000013 HC RX MED GY IP 250 OP 250 PS 637

## 2024-11-22 PROCEDURE — 84443 ASSAY THYROID STIM HORMONE: CPT | Performed by: FAMILY MEDICINE

## 2024-11-22 PROCEDURE — 99207 PR APP CREDIT; MD BILLING SHARED VISIT: CPT | Mod: FS

## 2024-11-22 PROCEDURE — 250N000011 HC RX IP 250 OP 636: Performed by: PSYCHIATRY & NEUROLOGY

## 2024-11-22 PROCEDURE — 96361 HYDRATE IV INFUSION ADD-ON: CPT | Performed by: FAMILY MEDICINE

## 2024-11-22 RX ORDER — GABAPENTIN 300 MG/1
300 CAPSULE ORAL EVERY 6 HOURS PRN
Status: DISCONTINUED | OUTPATIENT
Start: 2024-11-22 | End: 2024-11-22

## 2024-11-22 RX ORDER — AMOXICILLIN 250 MG
1 CAPSULE ORAL 2 TIMES DAILY PRN
Status: DISCONTINUED | OUTPATIENT
Start: 2024-11-22 | End: 2024-11-23 | Stop reason: HOSPADM

## 2024-11-22 RX ORDER — AMOXICILLIN 250 MG
2 CAPSULE ORAL 2 TIMES DAILY PRN
Status: DISCONTINUED | OUTPATIENT
Start: 2024-11-22 | End: 2024-11-23 | Stop reason: HOSPADM

## 2024-11-22 RX ORDER — LIDOCAINE 40 MG/G
CREAM TOPICAL
Status: DISCONTINUED | OUTPATIENT
Start: 2024-11-22 | End: 2024-11-23 | Stop reason: HOSPADM

## 2024-11-22 RX ORDER — GABAPENTIN 100 MG/1
300 CAPSULE ORAL EVERY 6 HOURS PRN
Status: DISCONTINUED | OUTPATIENT
Start: 2024-11-22 | End: 2024-11-23 | Stop reason: HOSPADM

## 2024-11-22 RX ORDER — BUPROPION HYDROCHLORIDE 100 MG/1
100 TABLET, EXTENDED RELEASE ORAL DAILY
Status: DISCONTINUED | OUTPATIENT
Start: 2024-11-22 | End: 2024-11-22

## 2024-11-22 RX ORDER — LEVOTHYROXINE SODIUM 75 UG/1
75 TABLET ORAL WEEKLY
Status: DISCONTINUED | OUTPATIENT
Start: 2024-11-24 | End: 2024-11-23 | Stop reason: HOSPADM

## 2024-11-22 RX ORDER — FLUTICASONE FUROATE AND VILANTEROL 100; 25 UG/1; UG/1
1 POWDER RESPIRATORY (INHALATION) DAILY
Status: DISCONTINUED | OUTPATIENT
Start: 2024-11-23 | End: 2024-11-23 | Stop reason: HOSPADM

## 2024-11-22 RX ORDER — ONDANSETRON 2 MG/ML
4 INJECTION INTRAMUSCULAR; INTRAVENOUS EVERY 6 HOURS PRN
Status: DISCONTINUED | OUTPATIENT
Start: 2024-11-22 | End: 2024-11-23 | Stop reason: HOSPADM

## 2024-11-22 RX ORDER — ROPINIROLE 2 MG/1
2 TABLET, FILM COATED ORAL 3 TIMES DAILY
Status: DISCONTINUED | OUTPATIENT
Start: 2024-11-22 | End: 2024-11-23 | Stop reason: HOSPADM

## 2024-11-22 RX ORDER — ALBUTEROL SULFATE 90 UG/1
2 INHALANT RESPIRATORY (INHALATION) EVERY 6 HOURS PRN
Status: DISCONTINUED | OUTPATIENT
Start: 2024-11-22 | End: 2024-11-23 | Stop reason: HOSPADM

## 2024-11-22 RX ORDER — POLYETHYLENE GLYCOL 3350 17 G/17G
17 POWDER, FOR SOLUTION ORAL 2 TIMES DAILY PRN
Status: DISCONTINUED | OUTPATIENT
Start: 2024-11-22 | End: 2024-11-23 | Stop reason: HOSPADM

## 2024-11-22 RX ORDER — CALCIUM CARBONATE 500 MG/1
1000 TABLET, CHEWABLE ORAL 4 TIMES DAILY PRN
Status: DISCONTINUED | OUTPATIENT
Start: 2024-11-22 | End: 2024-11-23 | Stop reason: HOSPADM

## 2024-11-22 RX ORDER — OXYCODONE HYDROCHLORIDE 5 MG/1
5 TABLET ORAL
Status: DISCONTINUED | OUTPATIENT
Start: 2024-11-22 | End: 2024-11-23 | Stop reason: HOSPADM

## 2024-11-22 RX ORDER — VILAZODONE HYDROCHLORIDE 10 MG/1
10 TABLET ORAL DAILY
Status: DISCONTINUED | OUTPATIENT
Start: 2024-11-22 | End: 2024-11-22

## 2024-11-22 RX ORDER — GABAPENTIN 400 MG/1
400 CAPSULE ORAL
Status: DISCONTINUED | OUTPATIENT
Start: 2024-11-22 | End: 2024-11-23 | Stop reason: HOSPADM

## 2024-11-22 RX ORDER — ONDANSETRON 4 MG/1
4 TABLET, ORALLY DISINTEGRATING ORAL EVERY 6 HOURS PRN
Status: DISCONTINUED | OUTPATIENT
Start: 2024-11-22 | End: 2024-11-23 | Stop reason: HOSPADM

## 2024-11-22 RX ORDER — GUAIFENESIN 600 MG/1
600 TABLET, EXTENDED RELEASE ORAL EVERY MORNING
Status: DISCONTINUED | OUTPATIENT
Start: 2024-11-23 | End: 2024-11-23 | Stop reason: HOSPADM

## 2024-11-22 RX ORDER — LEVOTHYROXINE SODIUM 150 UG/1
150 TABLET ORAL
Status: DISCONTINUED | OUTPATIENT
Start: 2024-11-23 | End: 2024-11-23 | Stop reason: HOSPADM

## 2024-11-22 RX ORDER — KETOROLAC TROMETHAMINE 30 MG/ML
30 INJECTION, SOLUTION INTRAMUSCULAR; INTRAVENOUS ONCE
Status: COMPLETED | OUTPATIENT
Start: 2024-11-22 | End: 2024-11-22

## 2024-11-22 RX ORDER — TRAZODONE HYDROCHLORIDE 50 MG/1
50 TABLET, FILM COATED ORAL
Status: DISCONTINUED | OUTPATIENT
Start: 2024-11-22 | End: 2024-11-23 | Stop reason: HOSPADM

## 2024-11-22 RX ORDER — POTASSIUM CHLORIDE 750 MG/1
20 TABLET, EXTENDED RELEASE ORAL EVERY MORNING
Status: DISCONTINUED | OUTPATIENT
Start: 2024-11-23 | End: 2024-11-23 | Stop reason: HOSPADM

## 2024-11-22 RX ORDER — KETOROLAC TROMETHAMINE 30 MG/ML
30 INJECTION, SOLUTION INTRAMUSCULAR; INTRAVENOUS ONCE
Start: 2024-11-22 | End: 2024-11-22

## 2024-11-22 RX ORDER — ENOXAPARIN SODIUM 100 MG/ML
40 INJECTION SUBCUTANEOUS EVERY 24 HOURS
Status: DISCONTINUED | OUTPATIENT
Start: 2024-11-22 | End: 2024-11-23 | Stop reason: HOSPADM

## 2024-11-22 RX ADMIN — OXYCODONE HYDROCHLORIDE 5 MG: 5 TABLET ORAL at 21:56

## 2024-11-22 RX ADMIN — ROPINIROLE HYDROCHLORIDE 2 MG: 2 TABLET, FILM COATED ORAL at 17:15

## 2024-11-22 RX ADMIN — KETOROLAC TROMETHAMINE 30 MG: 30 INJECTION, SOLUTION INTRAMUSCULAR at 09:50

## 2024-11-22 RX ADMIN — OXYCODONE HYDROCHLORIDE 5 MG: 5 TABLET ORAL at 17:02

## 2024-11-22 RX ADMIN — ENOXAPARIN SODIUM 40 MG: 40 INJECTION SUBCUTANEOUS at 19:59

## 2024-11-22 RX ADMIN — SODIUM CHLORIDE 1000 ML: 9 INJECTION, SOLUTION INTRAVENOUS at 11:58

## 2024-11-22 RX ADMIN — Medication 6 MG: at 21:56

## 2024-11-22 RX ADMIN — ROPINIROLE HYDROCHLORIDE 2 MG: 2 TABLET, FILM COATED ORAL at 20:07

## 2024-11-22 ASSESSMENT — ACTIVITIES OF DAILY LIVING (ADL)
ADLS_ACUITY_SCORE: 0

## 2024-11-22 ASSESSMENT — COLUMBIA-SUICIDE SEVERITY RATING SCALE - C-SSRS
6. HAVE YOU EVER DONE ANYTHING, STARTED TO DO ANYTHING, OR PREPARED TO DO ANYTHING TO END YOUR LIFE?: NO
2. HAVE YOU ACTUALLY HAD ANY THOUGHTS OF KILLING YOURSELF IN THE PAST MONTH?: NO
1. IN THE PAST MONTH, HAVE YOU WISHED YOU WERE DEAD OR WISHED YOU COULD GO TO SLEEP AND NOT WAKE UP?: NO

## 2024-11-22 NOTE — ED NOTES
Called to give report to 5 med surg; Bed is not clean yet but nurse took report. Nurse will call ED once bed is clean and we can send patient up.

## 2024-11-22 NOTE — PROGRESS NOTES
Pt awake, oriented, VSS, no complains of pain, no shortness of breath, A fib on monitor, 12 lead ECG ordered and done at bedside in PACU, pt transferred to ED per Dr. Calvin and Dr. Riley order for further monitoring. Report given to ED RN.

## 2024-11-22 NOTE — CONSULTS
"Patient has Medicare D through [employer].  Patient's drug costs have exceeded $5,030, and as such will now pay a 25% coinsurance on all covered drugs.  (Also called the \"coverage gap\" or \"donut hole.\")      2024 pricing:    Xarelto/Eliquis  --Upon receipt of RX, [facility]charge [facility] can provide 1 mo free.  --Subsequent fills will be $149/mo.  --If total out-of-pocket costs exceed $8000, patient will pay $0 for the remainder of the calendar year.    For the remainder of the year, patient may want to participate in Xarelto's mail order program that provides Xarelto for $89/mo (or $250 for 3 mo), regardless of income.  Information can be found at [employer] or by calling [phone].  (Provider would e-prescribe RX for Xarelto to [facility] [address] [place] [address])    2025 pricing will depend on the parameters of the plan elected.    Alyssa Boyce  Pharmacy Technician/Liaison, Discharge Pharmacy   [phone] (voice or text)  [email or web address]  Pharmacy test claims are estimates and may not reflect final costs.   Suggested alternatives aim to be cost-effective but may not be therapeutically equivalent as this consult is informational and does not constitute medical advice.   Clinical decisions should be made by qualified healthcare providers.      "
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
## 2024-11-22 NOTE — ED PROVIDER NOTES
ED Provider Note  [facility]      History     Chief Complaint   Patient presents with    Irregular Heart Beat     Afib      HPI  Randi Cleary is a 69 year old female who presents to the emergency department with an irregular heart beat.  Patient was undergoing electroconvulsive therapy today and went into A-fib after the procedure. She does not have a prior history of atrial fibrillation she is aware of however she is currently in A-fib and completely and asymptomatic with a normal rate so it is hard to say whether or not she has previously had A-fib and staff at ECT were not able to be certain that she did not have A-fib prior to the procedure because she was in a normal rate which has not changed.      Of note, patient reports she has noticed a growing bump in the middle of her lower chest and upper abdomen.    Past Medical History  Past Medical History:   Diagnosis Date    Bipolar 2 disorder (H)     Breast cancer (H) 1986    lumpectomy, radiation, chemo    Chronic pain syndrome     COPD (chronic obstructive pulmonary disease) (H)     asthma    Cord compression (H) 12/21/2021    Dizzy     Drug tolerance     opioid    Esophageal reflux     Fatigue     Generalized anxiety disorder     Graves disease 1994    Hemochromatosis 02/14/2018    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 Created by Conversion  Replacement Utility updated for latest IMO load Formatting of this note might be different from the original. Created by Conversion  Replacement Utility updated for latest IMO load    History of corticosteroid therapy 11/19/2019    History of partial adrenalectomy (H) 11/19/2019    History of pheochromocytoma 11/19/2019    Hx antineoplastic chemotherapy     Hx of radiation therapy     Hyperlipidaemia     Hypertension     Impaired fasting glucose 2017    Injury of neck, whiplash 07/15/2021     Joint pain     KYAW (obstructive sleep apnea) 2016    Osteopenia     Pheochromocytoma, left 08/02/2017    laparoscopically removed    Postablative hypothyroidism 1995    Prediabetes 10/03/2019    by A1c    Psoriasis     Psoriatic arthropathy (H)     Pulmonary hypertension (H)     Right rotator cuff tear     RLS (restless legs syndrome)     on ropinorole    Sacroiliitis (H)     Serotonin syndrome 08/28/2020    [facility] - While on desvenlafaxine 100mg    Snoring     Spinal stenosis     Status post coronary angiogram 10/03/2019    Urinary incontinence     Vitamin B 12 deficiency 2009    Vitamin D deficiency 2010     Past Surgical History:   Procedure Laterality Date    ARTHRODESIS ANKLE      ARTHROPLASTY ANKLE Right 6/29/2015    Procedure: ARTHROPLASTY ANKLE;  Surgeon: Jason Coughlin MD;  Location: [facility]    ARTHROPLASTY REVISION ANKLE Right 6/29/2015    Procedure: ARTHROPLASTY REVISION ANKLE;  Surgeon: Jason Coughlin MD;  Location: [facility]    BIOPSY BREAST      BREAST BIOPSY, CORE RT/LT      COLONOSCOPY      COLONOSCOPY N/A 2/25/2021    Procedure: COLONOSCOPY;  Surgeon: Guru Elke Tolbert MD;  Location:  GI    CV CORONARY ANGIOGRAM N/A 10/3/2019    Procedure: CV CORONARY ANGIOGRAM;  Surgeon: Bryce Pierre MD;  Location:  HEART CARDIAC CATH LAB    CV RIGHT HEART CATH MEASUREMENTS RECORDED N/A 10/3/2019    Procedure: CV RIGHT HEART CATH;  Surgeon: Bryce Pierre MD;  Location:  HEART CARDIAC CATH LAB    CV RIGHT HEART CATH MEASUREMENTS RECORDED N/A 9/25/2024    Procedure: Heart Cath Right Heart Cath;  Surgeon: George Becker MD;  Location:  HEART CARDIAC CATH LAB    ESOPHAGOSCOPY, GASTROSCOPY, DUODENOSCOPY (EGD), COMBINED N/A 2/25/2021    Procedure: ESOPHAGOGASTRODUODENOSCOPY, WITH BIOPSY;  Surgeon: Guru Elke Tolbert MD;  Location:  GI    EYE SURGERY  2021    HC REMOVE TONSILS/ADENOIDS,<13 Y/O      Description: Tonsillectomy With  Adenoidectomy;  Recorded: 04/07/2010;    IR LUMBAR EPIDURAL STEROID INJECTION  10/26/2004    IR LUMBAR EPIDURAL STEROID INJECTION  11/16/2004    IR LUMBAR EPIDURAL STEROID INJECTION  12/21/2004    IR LUMBAR EPIDURAL STEROID INJECTION  6/8/2006    JOINT REPLACEMENT      LAMINOPLASTY CERVICAL POSTERIOR THREE+ LEVELS Left 12/21/2021    Procedure: CERVICAL 3-CERVICAL 6 LEFT OPEN DOOR LAMINOPLASTY AND LEFT CERVICAL 4-5 AND CERVICAL 6-7 POSTERIOR FORAMINOTOMY;  Surgeon: Angela Gregory MD;  Location: [facility] OR    LAPAROSCOPIC ADRENALECTOMY Left 08/02/2017    pheochromocytoma    LAPAROSCOPIC ADRENALECTOMY Left 8/2/2017    Procedure: LAPAROSCOPIC LEFT ADRENALECTOMY, ;  Surgeon: Gab Linares MD;  Location: [facility] OR;  Service:     LENGTHEN TENDON ACHILLES Right 6/29/2015    Procedure: LENGTHEN TENDON ACHILLES;  Surgeon: Jason Coughlin MD;  Location: [facility]    LUMPECTOMY BREAST      LUMPECTOMY BREAST Left 1994    MAMMOPLASTY REDUCTION Right 01/13/2015    [place]    MAMMOPLASTY REDUCTION Right     approx late 2015/early2016    MASTECTOMY      left lumpectomy with axillary node dissection    MASTECTOMY MODIFIED RADICAL      OTHER SURGICAL HISTORY Right     reconstructive breast surgery    OTHER SURGICAL HISTORY      Adrenalectomy for pheochromocytoma    NM MASTECTOMY, MODIFIED RADICAL      Description: Modified Radical Mastectomy Left Breast;  Recorded: 04/07/2010;    REPAIR HAMMER TOE Right 6/29/2015    Procedure: REPAIR HAMMER TOE;  Surgeon: Jason Coughlin MD;  Location: [facility]    TONSILLECTOMY      TONSILLECTOMY & ADENOIDECTOMY      ZZC ARTHRODESIS,ANKLE,OPEN Right     Description: Ankle Arthrodesis;  Recorded: 04/07/2010;     acetaminophen (TYLENOL) 325 MG tablet  albuterol (VENTOLIN HFA) 108 (90 Base) MCG/ACT inhaler  allopurinol (ZYLOPRIM) 300 MG tablet  bisacodyl (DULCOLAX) 5 MG EC tablet  Cyanocobalamin (VITAMIN B-12) 5000 MCG SUBL  ethacrynic acid (EDECRIN) 25 MG  "tablet  Fluticasone-Umeclidin-Vilanterol (TRELEGY ELLIPTA) 200-62.5-25 MCG/ACT oral inhaler  gabapentin (NEURONTIN) 100 MG capsule  gabapentin (NEURONTIN) 300 MG capsule  guaiFENesin (MUCINEX) 600 MG 12 hr tablet  ketoconazole (NIZORAL) 2 % external shampoo  KLOR-CON M20 20 MEQ CR tablet  MAGNESIUM PO  medical cannabis (Patient's own supply)  naloxone (NARCAN) 4 MG/0.1ML nasal spray  omeprazole (PRILOSEC) 20 MG DR capsule  oxyCODONE (ROXICODONE) 5 MG tablet  polyethylene glycol (GOLYTELY) 236 g suspension  rOPINIRole (REQUIP) 2 MG tablet  STATIN NOT PRESCRIBED (INTENTIONAL)  STATIN NOT PRESCRIBED (INTENTIONAL)  SYNTHROID 150 MCG tablet  traZODone (DESYREL) 50 MG tablet  UNABLE TO FIND  vilazodone (VIIBRYD) 10 MG TABS tablet  vitamin C (ASCORBIC ACID) 1000 MG TABS  vitamin D3 (CHOLECALCIFEROL) 250 mcg (50412 units) capsule      Allergies   Allergen Reactions    Serotonin Reuptake Inhibitors (Ssris) Anxiety, Difficulty breathing, Headache, Palpitations and Shortness Of Breath    Buspirone      The patient states she had serotonin syndrome    Cephalexin      Other reaction(s): unknown rxn.    Desvenlafaxine      Serotonin syndrome    Diclofenac Sodium [Diclofenac]      Serotonin syndrome and restless legs syndrome    Gabapentin      Drove on the wrong side of the highway    Levofloxacin      \"CAN'T REMEMBER\"    Penicillins      \"SORES IN MOUTH\"    Riluzole Difficulty breathing and Swelling    Sulfa Antibiotics      \"PT DOES NOT KNOW WHAT THE REACTION WAS\"    Topiramate Other (See Comments)     Frequent urination     Family History  Family History   Problem Relation Age of Onset    Obesity Mother     Thyroid Disease Mother     Arthritis Mother     Hypertension Mother     Osteoporosis Mother     Hemochromatosis Father     Myocardial Infarction Father     Snoring Father     Heart Disease Father     Obesity Father     Coronary Artery Disease Father          at age 53 of heart attack    Hypertension Father     Genetic " "Disorder Father         Hemachromatosis    Lupus Sister     Hypertension Sister     Obesity Sister     Scleroderma Sister     Heart Failure Sister     Hemochromatosis Sister     Obesity Sister     Cardiovascular Sister     Hypertension Sister     Arthritis Sister     Hemochromatosis Sister     Lupus Sister     Scleroderma Sister     Coronary Artery Disease Sister     Genetic Disorder Sister         Lupus, Hemachromatosis    Hemochromatosis Brother     Hypertension Brother     Alcoholism Brother     Substance Abuse Brother     Cardiovascular Brother     Hypertension Brother     Arthritis Brother     Hemochromatosis Brother     Prostate Cancer Brother     Genetic Disorder Brother         Hemachromatosis    Obesity Brother     Cardiovascular Maternal Grandmother     Coronary Artery Disease Maternal Grandmother     Osteoporosis Maternal Grandmother     Alcoholism Maternal Grandfather     Cerebrovascular Disease Paternal Grandmother     Mental Illness Maternal Uncle     Adrenal Disorder No family hx of     Breast Cancer No family hx of     Anesthesia Reaction No family hx of     Deep Vein Thrombosis (DVT) No family hx of      Social History   Social History     Tobacco Use    Smoking status: Former     Current packs/day: 0.00     Average packs/day: 2.5 packs/day for 29.2 years (72.9 ttl pk-yrs)     Types: Cigarettes     Start date: 1971     Quit date: 2000     Years since quittin.4     Passive exposure: Current    Smokeless tobacco: Never   Vaping Use    Vaping status: Never Used   Substance Use Topics    Alcohol use: Not Currently     Comment: relapse 2021 sober     Drug use: Yes     Types: Marijuana     Comment: smoking and edibles daily      A medically appropriate review of systems was performed with pertinent positives and negatives noted in the HPI, and all other systems negative.    Physical Exam   BP: 90/57  Pulse: 70  Temp: 97.7  F (36.5  C)  Resp: 18  Height: 167.6 cm (5' 6\")  Weight: 73.9 kg " (162 lb 14.4 oz)  SpO2: 93 %  Physical Exam  Constitutional:       General: She is not in acute distress.     Appearance: Normal appearance. She is not diaphoretic.   HENT:      Head: Atraumatic.      Mouth/Throat:      Mouth: Mucous membranes are moist.   Eyes:      General: No scleral icterus.     Conjunctiva/sclera: Conjunctivae normal.   Cardiovascular:      Rate and Rhythm: Normal rate.      Heart sounds: Normal heart sounds.   Pulmonary:      Effort: No respiratory distress.      Breath sounds: Normal breath sounds.   Abdominal:      General: Abdomen is flat.   Musculoskeletal:      Cervical back: Neck supple.   Skin:     General: Skin is warm.      Findings: No rash.   Neurological:      General: No focal deficit present.      Mental Status: She is alert and oriented to person, place, and time.      Sensory: No sensory deficit.      Motor: No weakness.      Coordination: Coordination normal.   Psychiatric:         Mood and Affect: Mood is anxious. Affect is tearful.         Speech: Speech normal.         Behavior: Behavior is cooperative.         Thought Content: Thought content does not include homicidal or suicidal ideation.           ED Course, Procedures, & Data      Procedures         Results for orders placed or performed during the hospital encounter of 11/22/24   POC US ECHO LIMITED     Status: None    Impression    Limited Bedside Cardiac Ultrasound, performed and interpreted by me.   Indication: Weakness.  Parasternal long axis, parasternal short axis, and apical 4 chamber views were acquired.   Image quality was limited.    Findings:    Abnormal left ventricle slightly dilated, gross estimation of EF 45%, irregular beats noted consistent with atrial fibrillation    IMPRESSION: Abnormal limited cardiac ultrasound showing slightly dilated left ventricle, atrial fib, EF approximately 45%.  No pericardial effusion.      Comprehensive metabolic panel     Status: Abnormal   Result Value Ref Range     Sodium 140 135 - 145 mmol/L    Potassium 3.6 3.4 - 5.3 mmol/L    Carbon Dioxide (CO2) 21 (L) 22 - 29 mmol/L    Anion Gap 12 7 - 15 mmol/L    Urea Nitrogen 18.5 8.0 - 23.0 mg/dL    Creatinine 0.60 0.51 - 0.95 mg/dL    GFR Estimate >90 >60 mL/min/1.73m2    Calcium 8.7 (L) 8.8 - 10.4 mg/dL    Chloride 107 98 - 107 mmol/L    Glucose 114 (H) 70 - 99 mg/dL    Alkaline Phosphatase 65 40 - 150 U/L    AST 17 0 - 45 U/L    ALT 11 0 - 50 U/L    Protein Total 5.9 (L) 6.4 - 8.3 g/dL    Albumin 3.6 3.5 - 5.2 g/dL    Bilirubin Total 0.3 <=1.2 mg/dL   Troponin T, High Sensitivity     Status: Abnormal   Result Value Ref Range    Troponin T, High Sensitivity 16 (H) <=14 ng/L   TSH with free T4 reflex     Status: Normal   Result Value Ref Range    TSH 0.40 0.30 - 4.20 uIU/mL   Nt probnp inpatient (BNP)     Status: Normal   Result Value Ref Range    N terminal Pro BNP Inpatient 522 0 - 900 pg/mL   CBC with platelets and differential     Status: Abnormal   Result Value Ref Range    WBC Count 7.8 4.0 - 11.0 10e3/uL    RBC Count 5.04 3.80 - 5.20 10e6/uL    Hemoglobin 16.9 (H) 11.7 - 15.7 g/dL    Hematocrit 47.0 35.0 - 47.0 %    MCV 93 78 - 100 fL    MCH 33.5 (H) 26.5 - 33.0 pg    MCHC 36.0 31.5 - 36.5 g/dL    RDW 12.4 10.0 - 15.0 %    Platelet Count 188 150 - 450 10e3/uL    % Neutrophils 75 %    % Lymphocytes 15 %    % Monocytes 9 %    % Eosinophils 1 %    % Basophils 0 %    % Immature Granulocytes 0 %    NRBCs per 100 WBC 0 <1 /100    Absolute Neutrophils 5.8 1.6 - 8.3 10e3/uL    Absolute Lymphocytes 1.2 0.8 - 5.3 10e3/uL    Absolute Monocytes 0.7 0.0 - 1.3 10e3/uL    Absolute Eosinophils 0.1 0.0 - 0.7 10e3/uL    Absolute Basophils 0.0 0.0 - 0.2 10e3/uL    Absolute Immature Granulocytes 0.0 <=0.4 10e3/uL    Absolute NRBCs 0.0 10e3/uL   Troponin T, High Sensitivity     Status: Abnormal   Result Value Ref Range    Troponin T, High Sensitivity 16 (H) <=14 ng/L   Magnesium     Status: Normal   Result Value Ref Range    Magnesium 1.9 1.7 -  2.3 mg/dL   Ferritin     Status: Normal   Result Value Ref Range    Ferritin 139 11 - 328 ng/mL   EKG 12 lead     Status: None   Result Value Ref Range    Systolic Blood Pressure  mmHg    Diastolic Blood Pressure  mmHg    Ventricular Rate 71 BPM    Atrial Rate 52 BPM    LA Interval  ms    QRS Duration 118 ms     ms    QTc 445 ms    P Axis  degrees    R AXIS 49 degrees    T Axis 38 degrees    Interpretation ECG       Atrial fibrillation  Incomplete right bundle branch block  Abnormal ECG  Unconfirmed report - interpretation of this ECG is computer generated - see medical record for final interpretation    Confirmed by - EMERGENCY ROOM, PHYSICIAN ([id number]),  SHUBHAM GARNETT ([id number]) on 11/22/2024 2:28:13 PM     CBC with platelets differential     Status: Abnormal    Narrative    The following orders were created for panel order CBC with platelets differential.  Procedure                               Abnormality         Status                     ---------                               -----------         ------                     CBC with platelets and d...[[id number]]  Abnormal            Final result                 Please view results for these tests on the individual orders.   Results for orders placed or performed during the hospital encounter of 11/22/24   EKG 12-lead, tracing only     Status: None (Preliminary result)   Result Value Ref Range    Systolic Blood Pressure  mmHg    Diastolic Blood Pressure  mmHg    Ventricular Rate 81 BPM    Atrial Rate 416 BPM    LA Interval  ms    QRS Duration 92 ms     ms    QTc 457 ms    P Axis  degrees    R AXIS 37 degrees    T Axis 17 degrees    Interpretation ECG       ** Poor data quality, interpretation may be adversely affected  Atrial fibrillation  Incomplete right bundle branch block  Abnormal ECG  When compared with ECG of 22-May-2024 08:26,  Atrial fibrillation has replaced Sinus rhythm  Incomplete right bundle branch block has replaced Right bundle branch  block       Medications   albuterol (PROVENTIL HFA/VENTOLIN HFA) inhaler (has no administration in time range)   ethacrynic acid (EDECRIN) half-tab 12.5 mg (has no administration in time range)   fluticasone-vilanterol (BREO ELLIPTA) 100-25 MCG/ACT inhaler 1 puff (has no administration in time range)     And   umeclidinium (INCRUSE ELLIPTA) 62.5 MCG/ACT inhaler 1 puff (has no administration in time range)   gabapentin (NEURONTIN) capsule 300 mg (has no administration in time range)   potassium chloride jeannette ER (KLOR-CON M10) CR tablet 20 mEq (has no administration in time range)   omeprazole (PriLOSEC) CR capsule 20 mg (has no administration in time range)   oxyCODONE (ROXICODONE) tablet 5 mg (5 mg Oral $Given 11/22/24 1702)   rOPINIRole (REQUIP) tablet 2 mg (2 mg Oral $Given 11/22/24 1715)   levothyroxine (SYNTHROID/LEVOTHROID) tablet 150 mcg (has no administration in time range)   lidocaine 1 % 0.1-1 mL (has no administration in time range)   lidocaine (LMX4) cream (has no administration in time range)   sodium chloride (PF) 0.9% PF flush 3 mL (3 mLs Intracatheter Not Given 11/22/24 1448)   sodium chloride (PF) 0.9% PF flush 3 mL (has no administration in time range)   senna-docusate (SENOKOT-S/PERICOLACE) 8.6-50 MG per tablet 1 tablet (has no administration in time range)     Or   senna-docusate (SENOKOT-S/PERICOLACE) 8.6-50 MG per tablet 2 tablet (has no administration in time range)   calcium carbonate (TUMS) chewable tablet 1,000 mg (has no administration in time range)   polyethylene glycol (MIRALAX) Packet 17 g (has no administration in time range)   ondansetron (ZOFRAN ODT) ODT tab 4 mg (has no administration in time range)     Or   ondansetron (ZOFRAN) injection 4 mg (has no administration in time range)   enoxaparin ANTICOAGULANT (LOVENOX) injection 40 mg (has no administration in time range)   guaiFENesin (MUCINEX) 12 hr tablet 600 mg (has no administration in time range)   gabapentin (NEURONTIN) capsule  100 mg (has no administration in time range)   buPROPion (WELLBUTRIN SR) 12 hr tablet 100 mg (has no administration in time range)   melatonin tablet 6 mg (has no administration in time range)   traZODone (DESYREL) tablet 50 mg (has no administration in time range)   sodium chloride 0.9% BOLUS 1,000 mL (has no administration in time range)   sodium chloride 0.9% BOLUS 1,000 mL (0 mLs Intravenous Stopped 11/22/24 1340)     Labs Ordered and Resulted from Time of ED Arrival to Time of ED Departure   COMPREHENSIVE METABOLIC PANEL - Abnormal       Result Value    Sodium 140      Potassium 3.6      Carbon Dioxide (CO2) 21 (*)     Anion Gap 12      Urea Nitrogen 18.5      Creatinine 0.60      GFR Estimate >90      Calcium 8.7 (*)     Chloride 107      Glucose 114 (*)     Alkaline Phosphatase 65      AST 17      ALT 11      Protein Total 5.9 (*)     Albumin 3.6      Bilirubin Total 0.3     TROPONIN T, HIGH SENSITIVITY - Abnormal    Troponin T, High Sensitivity 16 (*)    CBC WITH PLATELETS AND DIFFERENTIAL - Abnormal    WBC Count 7.8      RBC Count 5.04      Hemoglobin 16.9 (*)     Hematocrit 47.0      MCV 93      MCH 33.5 (*)     MCHC 36.0      RDW 12.4      Platelet Count 188      % Neutrophils 75      % Lymphocytes 15      % Monocytes 9      % Eosinophils 1      % Basophils 0      % Immature Granulocytes 0      NRBCs per 100 WBC 0      Absolute Neutrophils 5.8      Absolute Lymphocytes 1.2      Absolute Monocytes 0.7      Absolute Eosinophils 0.1      Absolute Basophils 0.0      Absolute Immature Granulocytes 0.0      Absolute NRBCs 0.0     TROPONIN T, HIGH SENSITIVITY - Abnormal    Troponin T, High Sensitivity 16 (*)    TSH WITH FREE T4 REFLEX - Normal    TSH 0.40     NT PROBNP INPATIENT - Normal    N terminal Pro BNP Inpatient 522     MAGNESIUM - Normal    Magnesium 1.9     FERRITIN - Normal    Ferritin 139       POC US ECHO LIMITED   Final Result   Limited Bedside Cardiac Ultrasound, performed and interpreted by me.     Indication: Weakness.   Parasternal long axis, parasternal short axis, and apical 4 chamber views were acquired.    Image quality was limited.      Findings:     Abnormal left ventricle slightly dilated, gross estimation of EF 45%, irregular beats noted consistent with atrial fibrillation      IMPRESSION: Abnormal limited cardiac ultrasound showing slightly dilated left ventricle, atrial fib, EF approximately 45%.  No pericardial effusion.          Echo Complete    (Results Pending)          Critical care was not performed.     Medical Decision Making  The patient's presentation was of high complexity (an acute health issue posing potential threat to life or bodily function).    The patient's evaluation involved:  ordering and/or review of 3+ test(s) in this encounter (see separate area of note for details)    The patient's management necessitated high risk (a decision regarding hospitalization).    Assessment & Plan        I have reviewed the nursing notes. I have reviewed the findings, diagnosis, plan and need for follow up with the patient.    Patient was discussed at length with both cardiology and medicine at this time discussion was to bring patient in for observation unclear as to when and how the A-fib started remaining asymptomatic with normal rate will trend troponin and follow patient's rhythm making determination as to whether or not she needs to be anticoagulated.    Final diagnoses:   Atrial fibrillation, unspecified type (H)   Status post electroconvulsive therapy       Bravo Lomeli MD  [facility] EMERGENCY DEPARTMENT  11/22/2024     Bravo Lomeli MD  11/22/24 3831

## 2024-11-22 NOTE — DISCHARGE INSTRUCTIONS
ECT Discharge Instructions      After each ECT treatment:    Get plenty of rest. A responsible adult must stay with your for at least 6 hours.  Avoid heavy or risky activities for 24 hours while in maintenance ECT.   Do not drive for at least 24 hours after your treatment while in maintenance ECT.   If you have more than one treatment within one week, do not drive for 7 days after your last treatment.   If you feel light-headed, sit for a few minutes before standing. Have someone help you get up.  If you have nausea (feel sick to your stomach): Drink only clear liquids such as apple juice, ginger ale, broth or 7UP, Be sure to drink plenty of liquids. Move to a normal diet as you feel able.   If you received Toradol, wait 6 hours before taking ibuprofen.  Call your doctor if  You have a fever over 100F (37.7 C) (taken under the tongue), or a fever that last more than 24 hours.  Your IV site is red, swollen, very painful or is getting more tender.  You have nausea that gets very bad or does not improve.    If you have any symptoms after ECT, tell our staff before your next treatment.  The ECT Department can be reached at [phone].  The ECT Department is open Mondays, Wednesdays and Fridays from 7:00 AM to 2:00 PM.    To speak to a doctor, call:  Your primary care provider or [facility] for Dr. Riley, Dr. Beavers, Dr. Hutchison or Dr. Cotter PHONE: [phone]; fax: [phone]    New instructions:    - Plan for maintenance [id number] if remains stable can start to space in preparation will need to be Q4wks by January 8th to allow recovery from shoulder surgery  - Talk with your outpatient provider about adjusting medications to help with sleep. Let us know any changes made to medications the next time you come for ECT.   - Remember to NOT take gabapentin after 6pm the night before each treatment  - You will have to check to make sure somebody can drive you to and from the hospital and monitor you for 6  hours after each treatment  - Maintenance ECT  every two weeks, then to once every month.  The goal of maintenance ECT is to space out and eventually stop  - During maintenance, you can't drive for 24 hours after each treatment.    You have received Toradol today at 09:50 am. Toradol is an NSAID.  Do not take any NSAID's including: Ibuprofen, Naproxen Sodium, Asprin, Advil, Motrin, Aleve, Shannan, etc., until six hours after the above time which would be 3:50 pm. If you have any questions check back with your Physician, or pharmacist.     Covid-19 Testing:  We are no longer requiring covid tests prior to your procedure. If you are ill, please call the ECT department to discuss plan for rescheduling your appointment. [phone]  Please come to the [facility] and check-in with Security before your ECT appointment.  The [facility] is located right next to the Adult Emergency Room.  The address is [address] [place] [address].    After your appointment, you may be picked up at the [facility] entrance, which is located at [address] [place] [address].  [facility], about 1 block North of the [facility].    Transported by:   Louie Little    Reviewed AVS discharge instructions with:   Louie Little

## 2024-11-22 NOTE — H&P
[facility]    History and Physical - Hospitalist Service, GOLD TEAM 18       Date of Admission:  11/22/2024    Assessment & Plan      Randi Cleary is a 69 year old female with PMH of HTN, pulmonary HTN, KYAW, COPD, hereditary hemochromatosis, chronic pain on long term opioids, breast cancer s/p mastectomy & reconstruction, treatment resistant depression, currently undergoing ECT admitted on 11/22/2024 with new onset atrial fibrillation noted by anesthesiology following ECT session.     #New onset atrial fibrillation  Pt undergoing ECT on 11/22 when she flipped into new onset afib as noted by Anesthesiology. Transferred to the ED for further assessment--> EKG w/ irregularly irregular rhythm; some possible p waves, but no consistent, discernable p waves across leads. C/w atrial fibrillation. Did have one hypotensive BP of 74/50, which improved following administration of 1L NS. HR has remained WNL, so more suspicious of hypovolemia as cause of hypotension vs atrial fibrillation as cause of hemodynamic instability.   History is difficult to discern from patient given her emotional lability, difficulty recalling details, and inattentiveness. She does endorse dizziness x2-3 months that she has attributed to new Psych medications. Also w/ poor appetite and somewhat decreased PO intake. Endorses 10-20 lb of unintentional weight loss over last 2 months and attributes it to not eating enough. No chest pain and no worsened shortness of breath (was following w/ Dr. Edwards for SOB previously, see below). Denies palpitations. At the time of interview, pt w/o symptoms.   Workup with elevated troponin to 16 and flat on repeat, hgb 16.9 (hemochromatosis hx), non AGMA with bicarb of 21/AG 12. Normal TSH, BNP, WBC, potassium, magnesium, and sodium. Cr at baseline.  Etiology and chronicity of atrial fibrillation unclear and challenging to determine given pt's inability to provide  "reliable hx. Dizziness may be attributable to atrial fibrillation, medications, poor PO intake, etc. Afib may be a single event related to dehydration, anesthesia for ECT or a more chronic issue. [id number] score is 3 (age, sex, HTN), so would qualify for anticoagulation. Pt hesitant to start on AC (mother was on warfarin and \"went through a lot\") and also not in a mental space to have a productive risks vs benefits discussion, so will monitor, keep stable, and proceed with further discussion in coming day(s).   - Notify Primary team w/ hypotension (SBP <90), sustained RVR (>120), development of symptoms  - Continue telemetry  - Hold off on AC for now given HDS and unclear trigger/chronicity, further discussions pending  - Consult Cardiology consult if desired  - Additional NS bolus today; s/p 1L NS bolus in ED  :: Would recommend Cardiology follow up in OP setting to continue to monitor    #HTN  #Pulmonary arterial hypertension  Followed w/ Dr. Edwards of Cardiology. Echo in Dec 2023 w/ elevated R & L sided filling pressures w/ moderately elevated PAH, normal cardiac output. Hence, RHC was recommended for follow up assessment and was unremarkable-- no elevated filling pressures, no increased PA pressure on RHC. Pt cancelled appt scheduled for Oct 2024 as she was feeling too overwhelmed with all her care teams, but is welcome to follow up PRN.  - PTA ethacrynic acid 25 MG tablet  - PTA KCl 20mEq daily    #Undergoing ECT  #Treatment resistant depression  #CHAVO  #Borderline personality disorder  #Insomnia  #H/o alcohol use disorder (last use ~2 years ago and preceded by 20yrs sobriety)  Life long history of depression w/ multiple medications/psychotherapies tried and unsuccessful. Has been receiving ECT since May 2024. Was undergoing ECT on day of admission, 11/22/24, when she flipped into atrial fibrillation, as above.   On admission, pt is inattentive, tearful, anxious, labile, and demonstrates inability to provide " reliable history. She has some forgetfulness, inability to remain focused on the topic at hand, uncertainty when discussing symptoms, timing of events, medications, etc.   Follows w/ Psychology and Psychiatry outpatient.   - Gabapentin 300mg at bedtime PRN and 100mg Q6H PRN for anxiety  - Dextromethorphan 45mg & bupropion SR 100mg daily (mimics Auvelity)   - Melatonin 6mg nightly  - Trazodone 50mg at bedtime PRN  - Given complexity of pt Psych hx, hesitant to hold/adjust medications on inpatient basis unless there is a clear association w/ current afib presentation   -- Recommend close follow up w/ Psychiatry to discuss potential side effects of medications, especially dizziness that pt reports    #COPD  No increase in COPD s/s reported-- no increased sputum production, new oxygen need, etc. Considered COPD exacerbation as etiology of afib, but presentation not consistent w/ this.   - PTA Incruse Ellipta subbed here w/ Breo Ellipta   - PTA albuterol inhaler PRN    #Hereditary hemochromatosis  #Iron overload  Hgb 16.9 on admission. Follows w/ Dr. Pablo at [facility], last visit 8/22/24. 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 Her ferritin goal is set at 125 given RLS & fatigue (usually goal is </= 50). Instructed to get labs every 2 months--> last ferritin in Aug 2024 of 95.  - Follow up w/ Dr. 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     #Rib/sternal abnormality  Pointed out by patient as she is worried it is the cause of her afib. On exam, 1-2cm protuberance from R rib cage, just lateral to the xiphoid process. Non tender to palpation. Firm, non mobile. Pt endorses fall several months ago that resulted in bruised ribs on the L side. Exam not consistent w/ bruising or fracture  given absence of swelling, tenderness. Wonder if it could be related to her mastectomy or reconstruction?   - Follow up with PCP    #Grave's disease s/p ablation: TSH WNL on admission. Continue PTA levothyroxine 150mcg daily.  #RLS: PTA ropinerol 2mg TID  #H/o pheochromocytoma s/p surgical removal  #Hepatic steatosis  #Hx breast cancer s/p mastectomy & reconstruction   #Hiatal hernia          Diet: Combination Diet Regular Diet Adult  DVT Prophylaxis: Enoxaparin (Lovenox) SQ  Babcock Catheter: Not present  Lines: None     Cardiac Monitoring: ACTIVE order. Indication: Tachyarrhythmias, acute (48 hours)  Code Status: Full Code    Clinically Significant Risk Factors Present on Admission                   # Hypertension: Noted on problem list                      Disposition Plan     Medically Ready for Discharge: Anticipated in 2-4 Days         The patient's care was discussed with the Attending Physician, Dr. Wan, Patient, and Patient's Family.    Darrius Ashraf PA-C  Hospitalist Service, [address]  Securely message with [employer] (more info)  Text page via [facility] Paging/Directory   See signed in provider for up to date coverage information    ______________________________________________________________________    Chief Complaint   New onset afib    History is obtained from the patient, electronic health record, and patient's spouse    History of Present Illness   Randi Cleary is a 69 year old female who has PMH of HTN, pulmonary HTN, KYAW, COPD, hereditary hemochromatosis, chronic pain on long term opioids, breast cancer s/p mastectomy & reconstruction, treatment resistant depression, currently undergoing ECT.    History is somewhat unreliable given pt's emotional lability, inattentiveness, anxious affect. States she hasn't had issues w/ afib following previous ECT sessions. States she has been dizzy for 2 months PTA, but initially thought it was her  "psych meds. No chest pain or increased sob that she can recall recently. Endorses 10-20 lb of weight loss 2/2 decreased appetite for the last 2-3 months. No significant night sweats. States she has a \"growing mass\" beneath her sternum, wondering if that is causing her afib. States she gets \"pooling\" in her legs (not noted on exam). Walks with a cane. Lives at home with her .       Past Medical History    Past Medical History:   Diagnosis Date    Bipolar 2 disorder (H)     Breast cancer (H) 1986    lumpectomy, radiation, chemo    Chronic pain syndrome     COPD (chronic obstructive pulmonary disease) (H)     asthma    Cord compression (H) 12/21/2021    Dizzy     Drug tolerance     opioid    Esophageal reflux     Fatigue     Generalized anxiety disorder     Graves disease 1994    Hemochromatosis 02/14/2018    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 Created by Conversion  Replacement Utility updated for latest IMO load Formatting of this note might be different from the original. Created by Conversion  Replacement Utility updated for latest IMO load    History of corticosteroid therapy 11/19/2019    History of partial adrenalectomy (H) 11/19/2019    History of pheochromocytoma 11/19/2019    Hx antineoplastic chemotherapy     Hx of radiation therapy     Hyperlipidaemia     Hypertension     Impaired fasting glucose 2017    Injury of neck, whiplash 07/15/2021    Joint pain     KYAW (obstructive sleep apnea) 2016    Osteopenia     Pheochromocytoma, left 08/02/2017    laparoscopically removed    Postablative hypothyroidism 1995    Prediabetes 10/03/2019    by A1c    Psoriasis     Psoriatic arthropathy (H)     Pulmonary hypertension (H)     Right rotator cuff tear     RLS (restless legs syndrome)     on ropinorole    Sacroiliitis (H)     Serotonin syndrome 08/28/2020    [facility] - While on desvenlafaxine 100mg    " Snoring     Spinal stenosis     Status post coronary angiogram 10/03/2019    Urinary incontinence     Vitamin B 12 deficiency 2009    Vitamin D deficiency 2010       Past Surgical History   Past Surgical History:   Procedure Laterality Date    ARTHRODESIS ANKLE      ARTHROPLASTY ANKLE Right 6/29/2015    Procedure: ARTHROPLASTY ANKLE;  Surgeon: Jason Coughlin MD;  Location: [facility]    ARTHROPLASTY REVISION ANKLE Right 6/29/2015    Procedure: ARTHROPLASTY REVISION ANKLE;  Surgeon: Jason Coughlin MD;  Location: [facility]    BIOPSY BREAST      BREAST BIOPSY, CORE RT/LT      COLONOSCOPY      COLONOSCOPY N/A 2/25/2021    Procedure: COLONOSCOPY;  Surgeon: Guru Elke Tolbert MD;  Location:  GI    CV CORONARY ANGIOGRAM N/A 10/3/2019    Procedure: CV CORONARY ANGIOGRAM;  Surgeon: Bryce Pierre MD;  Location:  HEART CARDIAC CATH LAB    CV RIGHT HEART CATH MEASUREMENTS RECORDED N/A 10/3/2019    Procedure: CV RIGHT HEART CATH;  Surgeon: Bryce Pierre MD;  Location:  HEART CARDIAC CATH LAB    CV RIGHT HEART CATH MEASUREMENTS RECORDED N/A 9/25/2024    Procedure: Heart Cath Right Heart Cath;  Surgeon: George Becker MD;  Location:  HEART CARDIAC CATH LAB    ESOPHAGOSCOPY, GASTROSCOPY, DUODENOSCOPY (EGD), COMBINED N/A 2/25/2021    Procedure: ESOPHAGOGASTRODUODENOSCOPY, WITH BIOPSY;  Surgeon: Guru Elke Tolbert MD;  Location:  GI    EYE SURGERY  2021    HC REMOVE TONSILS/ADENOIDS,<11 Y/O      Description: Tonsillectomy With Adenoidectomy;  Recorded: 04/07/2010;    IR LUMBAR EPIDURAL STEROID INJECTION  10/26/2004    IR LUMBAR EPIDURAL STEROID INJECTION  11/16/2004    IR LUMBAR EPIDURAL STEROID INJECTION  12/21/2004    IR LUMBAR EPIDURAL STEROID INJECTION  6/8/2006    JOINT REPLACEMENT      LAMINOPLASTY CERVICAL POSTERIOR THREE+ LEVELS Left 12/21/2021    Procedure: CERVICAL 3-CERVICAL 6 LEFT OPEN DOOR LAMINOPLASTY AND LEFT CERVICAL 4-5 AND CERVICAL 6-7 POSTERIOR  FORAMINOTOMY;  Surgeon: Angela Gregory MD;  Location: [facility] OR    LAPAROSCOPIC ADRENALECTOMY Left 08/02/2017    pheochromocytoma    LAPAROSCOPIC ADRENALECTOMY Left 8/2/2017    Procedure: LAPAROSCOPIC LEFT ADRENALECTOMY, ;  Surgeon: Gab Linares MD;  Location: [facility] OR;  Service:     LENGTHEN TENDON ACHILLES Right 6/29/2015    Procedure: LENGTHEN TENDON ACHILLES;  Surgeon: Jason Coughlin MD;  Location: [facility]    LUMPECTOMY BREAST      LUMPECTOMY BREAST Left 1994    MAMMOPLASTY REDUCTION Right 01/13/2015    [place]    MAMMOPLASTY REDUCTION Right     approx late 2015/early2016    MASTECTOMY      left lumpectomy with axillary node dissection    MASTECTOMY MODIFIED RADICAL      OTHER SURGICAL HISTORY Right     reconstructive breast surgery    OTHER SURGICAL HISTORY      Adrenalectomy for pheochromocytoma    NH MASTECTOMY, MODIFIED RADICAL      Description: Modified Radical Mastectomy Left Breast;  Recorded: 04/07/2010;    REPAIR HAMMER TOE Right 6/29/2015    Procedure: REPAIR HAMMER TOE;  Surgeon: Jason Coughlin MD;  Location: [facility]    TONSILLECTOMY      TONSILLECTOMY & ADENOIDECTOMY      ZZC ARTHRODESIS,ANKLE,OPEN Right     Description: Ankle Arthrodesis;  Recorded: 04/07/2010;       Prior to Admission Medications   Prior to Admission Medications   Prescriptions Last Dose Informant Patient Reported? Taking?   Cyanocobalamin (VITAMIN B-12) 5000 MCG SUBL   Yes No   Sig: Place 2-3 sprays under the tongue every morning. Unknown dose. 2 or 3 sprays/day   Fluticasone-Umeclidin-Vilanterol (TRELEGY ELLIPTA) 200-62.5-25 MCG/ACT oral inhaler   No No   Sig: Inhale 1 puff into the lungs daily.   Patient taking differently: Inhale 1 puff into the lungs every morning.   KLOR-CON M20 20 MEQ CR tablet   No No   Sig: Take 1 tablet (20 mEq) by mouth every morning.   MAGNESIUM PO   Yes No   Sig: Take 1 capsule by mouth 2 times daily Strength unknown   STATIN NOT PRESCRIBED (INTENTIONAL)   Yes No   Sig:  Please choose reason not prescribed from choices below.   STATIN NOT PRESCRIBED (INTENTIONAL)   Yes No   Sig: Please choose reason not prescribed from choices below.   SYNTHROID 150 MCG tablet   No No   Sig: Take 1 tablet (150 mcg) by mouth every morning MON to SAT 1 tablet/day; SUN 0.5 tablet   UNABLE TO FIND   Yes No   Sig: Take 1 tablet by mouth every morning. MEDICATION NAME: CETYL-MYRISFOLEATE   acetaminophen (TYLENOL) 325 MG tablet   Yes No   Sig: Take 325-650 mg by mouth every 6 hours as needed for mild pain.   albuterol (VENTOLIN HFA) 108 (90 Base) MCG/ACT inhaler   No No   Sig: Inhale 2 puffs into the lungs every 6 hours as needed for shortness of breath, wheezing or cough   allopurinol (ZYLOPRIM) 300 MG tablet   No No   Sig: Take 1 tablet (300 mg) by mouth every morning   Patient taking differently: Take 300 mg by mouth as needed.   bisacodyl (DULCOLAX) 5 MG EC tablet   No No   Sig: Two days prior to exam take two (2) tablets at 4pm. One day prior to exam take two (2) tablets at 4pm   ethacrynic acid (EDECRIN) 25 MG tablet   No No   Sig: Half pill every day   Patient taking differently: Take 0.5 tablets by mouth every morning. Half pill every day   gabapentin (NEURONTIN) 100 MG capsule   No No   Sig: Take 1 capsule (100 mg) by mouth every 6 hours as needed (anxiety)   Patient taking differently: Take 300 mg by mouth every 6 hours as needed (anxiety).   gabapentin (NEURONTIN) 300 MG capsule   No No   Sig: Take 1 capsule (300 mg) by mouth nightly as needed for other (anxiety/sleep).   guaiFENesin (MUCINEX) 600 MG 12 hr tablet   Yes No   Sig: Take 600 mg by mouth every morning.   ketoconazole (NIZORAL) 2 % external shampoo   Yes No   medical cannabis (Patient's own supply)   Yes No   Sig: See Admin Instructions (The purpose of this order is to document that the patient reports taking medical cannabis.  This is not a prescription, and is not used to certify that the patient has a qualifying medical  condition.)  Flower   naloxone (NARCAN) 4 MG/0.1ML nasal spray   No No   Sig: Spray 1 spray (4 mg) into one nostril alternating nostrils as needed for opioid reversal every 2-3 minutes until assistance arrives   omeprazole (PRILOSEC) 20 MG DR capsule   No No   Sig: Take 1 capsule (20 mg) by mouth every morning   oxyCODONE (ROXICODONE) 5 MG tablet   No No   Sig: Take 1 tablet (5 mg) by mouth 5 times daily. May dispense 11/11 for 11/12   polyethylene glycol (GOLYTELY) 236 g suspension   No No   Sig: Two days before procedure at 5PM fill first container with water. Mix and drink an 8 oz glass every 15 minutes until HALF of the container is gone. Place the remainder in the refrigerator. One day before procedure at 5PM drink second half of bowel prep. Drink an 8 oz glass every 15 minutes until it is gone. Day of procedure 6 hours before arrival time fill the 2nd container with water. Mix and drink an 8 oz glass every 15 minutes until HALF of the container is gone. Discard the remaining solution.   rOPINIRole (REQUIP) 2 MG tablet   No No   Sig: Take 1 tablet (2 mg) by mouth 3 times daily. One tab 3 pm, one bedtime, and one during night on waking   traZODone (DESYREL) 50 MG tablet   No No   Sig: Take 0.5-1 tablets (25-50 mg) by mouth nightly as needed for sleep.   vilazodone (VIIBRYD) 10 MG TABS tablet   Yes No   Sig: Take 1 tablet (10 mg) by mouth daily.   vitamin C (ASCORBIC ACID) 1000 MG TABS   Yes No   Sig: Take 1,000 mg by mouth every morning.   vitamin D3 (CHOLECALCIFEROL) 250 mcg (63165 units) capsule   Yes No   Sig: Take 1 capsule by mouth once a week. SUNDAY'S      Facility-Administered Medications: None        Review of Systems    A 10 point Review of Systems was performed, but was panpositive.     Allergies   Allergies   Allergen Reactions    Serotonin Reuptake Inhibitors (Ssris) Anxiety, Difficulty breathing, Headache, Palpitations and Shortness Of Breath    Buspirone      The patient states she had serotonin  "syndrome    Cephalexin      Other reaction(s): unknown rxn.    Desvenlafaxine      Serotonin syndrome    Diclofenac Sodium [Diclofenac]      Serotonin syndrome and restless legs syndrome    Gabapentin      Drove on the wrong side of the highway    Levofloxacin      \"CAN'T REMEMBER\"    Penicillins      \"SORES IN MOUTH\"    Riluzole Difficulty breathing and Swelling    Sulfa Antibiotics      \"PT DOES NOT KNOW WHAT THE REACTION WAS\"    Topiramate Other (See Comments)     Frequent urination     ------------------------------------------------------------------------     Physical Exam   Vital Signs: Temp: 97.7  F (36.5  C) Temp src: Oral BP: 91/74 Pulse: 72   Resp: 18 SpO2: 97 %      Weight: 0 lbs 0 oz    General: Not toxic appearing, but does appear tired, anxious.   HEENT: Puffy undereyes, asymmetric corneal reflection (possible cataract in L eye?), EOMI. Normocephalic, atraumatic. No rhinorrhea or throat clearing.   Respiratory: Lungs CTAB, breathing comfortably on room air.   Cardiovascular: Atrial fibrillation, regular rate. No murmur appreciated.   GI:Abdomen non distended, soft.   MSK: 1-2cm protuberance from R rib cage, just lateral to the xiphoid process. Non tender to palpation. Firm, non mobile.   Neuro: Alert and oriented. Mildly forgetful.    Extremities: BL LE w/o appreciable edema.   Skin: Warm and dry.   Psych: Labile, tearful, inattentive, somewhat tangential. Not reliable history at this time.       Medical Decision Making       75 MINUTES SPENT BY ME on the date of service doing chart review, history, exam, documentation & further activities per the note.      Data   ------------------------- PAST 24 HR DATA REVIEWED -----------------------------------------------    I have personally reviewed the following data over the past 24 hrs:    7.8  \   16.9 (H)   / 188     140 107 18.5 /  114 (H)   3.6 21 (L) 0.60 \     ALT: 11 AST: 17 AP: 65 TBILI: 0.3   ALB: 3.6 TOT PROTEIN: 5.9 (L) LIPASE: N/A     Trop: 16 " (H) BNP: 522     TSH: 0.40 T4: N/A A1C: N/A       Imaging results reviewed over the past 24 hrs:   Recent Results (from the past 24 hours)   POC US ECHO LIMITED    Impression    Limited Bedside Cardiac Ultrasound, performed and interpreted by me.   Indication: Weakness.  Parasternal long axis, parasternal short axis, and apical 4 chamber views were acquired.   Image quality was limited.    Findings:    Abnormal left ventricle slightly dilated, gross estimation of EF 45%, irregular beats noted consistent with atrial fibrillation    IMPRESSION: Abnormal limited cardiac ultrasound showing slightly dilated left ventricle, atrial fib, EF approximately 45%.  No pericardial effusion.

## 2024-11-22 NOTE — ED TRIAGE NOTES
Patient was in ECT; converted into afib.      Triage Assessment (Adult)       Row Name 11/22/24 1102          Triage Assessment    Airway WDL WDL        Respiratory WDL    Respiratory WDL WDL        Cardiac WDL    Cardiac WDL X

## 2024-11-23 ENCOUNTER — APPOINTMENT (OUTPATIENT)
Dept: CARDIOLOGY | Facility: CLINIC | Age: 69
End: 2024-11-23
Payer: MEDICARE

## 2024-11-23 VITALS
TEMPERATURE: 97.6 F | RESPIRATION RATE: 16 BRPM | OXYGEN SATURATION: 94 % | BODY MASS INDEX: 26.34 KG/M2 | WEIGHT: 163.9 LBS | SYSTOLIC BLOOD PRESSURE: 103 MMHG | DIASTOLIC BLOOD PRESSURE: 61 MMHG | HEART RATE: 77 BPM | HEIGHT: 66 IN

## 2024-11-23 PROBLEM — F11.90 CHRONIC, CONTINUOUS USE OF OPIOIDS: Status: ACTIVE | Noted: 2024-11-23

## 2024-11-23 PROBLEM — I48.0 PAROXYSMAL ATRIAL FIBRILLATION (H): Status: ACTIVE | Noted: 2024-11-23

## 2024-11-23 PROBLEM — K44.9 HIATAL HERNIA: Status: ACTIVE | Noted: 2024-11-23

## 2024-11-23 PROBLEM — F33.2 SEVERE EPISODE OF RECURRENT MAJOR DEPRESSIVE DISORDER, WITHOUT PSYCHOTIC FEATURES (H): Status: ACTIVE | Noted: 2024-11-23

## 2024-11-23 PROBLEM — M10.9 URIC ACID ARTHROPATHY: Status: ACTIVE | Noted: 2024-11-23

## 2024-11-23 LAB
ANION GAP SERPL CALCULATED.3IONS-SCNC: 9 MMOL/L (ref 7–15)
BUN SERPL-MCNC: 12 MG/DL (ref 8–23)
CALCIUM SERPL-MCNC: 9 MG/DL (ref 8.8–10.4)
CHLORIDE SERPL-SCNC: 110 MMOL/L (ref 98–107)
CREAT SERPL-MCNC: 0.64 MG/DL (ref 0.51–0.95)
EGFRCR SERPLBLD CKD-EPI 2021: >90 ML/MIN/1.73M2
ERYTHROCYTE [DISTWIDTH] IN BLOOD BY AUTOMATED COUNT: 12.6 % (ref 10–15)
GLUCOSE SERPL-MCNC: 105 MG/DL (ref 70–99)
HCO3 SERPL-SCNC: 25 MMOL/L (ref 22–29)
HCT VFR BLD AUTO: 43.2 % (ref 35–47)
HGB BLD-MCNC: 14.9 G/DL (ref 11.7–15.7)
INR PPP: 0.99 (ref 0.85–1.15)
LVEF ECHO: NORMAL
MCH RBC QN AUTO: 32.9 PG (ref 26.5–33)
MCHC RBC AUTO-ENTMCNC: 34.5 G/DL (ref 31.5–36.5)
MCV RBC AUTO: 95 FL (ref 78–100)
PLATELET # BLD AUTO: 153 10E3/UL (ref 150–450)
POTASSIUM SERPL-SCNC: 3.6 MMOL/L (ref 3.4–5.3)
RBC # BLD AUTO: 4.53 10E6/UL (ref 3.8–5.2)
SODIUM SERPL-SCNC: 144 MMOL/L (ref 135–145)
WBC # BLD AUTO: 4.4 10E3/UL (ref 4–11)

## 2024-11-23 PROCEDURE — 93306 TTE W/DOPPLER COMPLETE: CPT

## 2024-11-23 PROCEDURE — G0378 HOSPITAL OBSERVATION PER HR: HCPCS

## 2024-11-23 PROCEDURE — 80048 BASIC METABOLIC PNL TOTAL CA: CPT

## 2024-11-23 PROCEDURE — 93306 TTE W/DOPPLER COMPLETE: CPT | Mod: 26 | Performed by: INTERNAL MEDICINE

## 2024-11-23 PROCEDURE — 99239 HOSP IP/OBS DSCHRG MGMT >30: CPT | Performed by: INTERNAL MEDICINE

## 2024-11-23 PROCEDURE — 36415 COLL VENOUS BLD VENIPUNCTURE: CPT

## 2024-11-23 PROCEDURE — 85048 AUTOMATED LEUKOCYTE COUNT: CPT

## 2024-11-23 PROCEDURE — 85610 PROTHROMBIN TIME: CPT | Performed by: INTERNAL MEDICINE

## 2024-11-23 PROCEDURE — 82565 ASSAY OF CREATININE: CPT

## 2024-11-23 PROCEDURE — 36415 COLL VENOUS BLD VENIPUNCTURE: CPT | Performed by: INTERNAL MEDICINE

## 2024-11-23 PROCEDURE — 250N000013 HC RX MED GY IP 250 OP 250 PS 637

## 2024-11-23 PROCEDURE — 85027 COMPLETE CBC AUTOMATED: CPT

## 2024-11-23 RX ORDER — ALLOPURINOL 300 MG/1
300 TABLET ORAL PRN
Status: SHIPPED
Start: 2024-11-23

## 2024-11-23 RX ORDER — GABAPENTIN 400 MG/1
400 CAPSULE ORAL AT BEDTIME
Status: SHIPPED
Start: 2024-11-23 | End: 2024-12-02

## 2024-11-23 RX ORDER — LEVOTHYROXINE SODIUM 150 MCG
150 TABLET ORAL EVERY MORNING
Status: SHIPPED
Start: 2024-11-23

## 2024-11-23 RX ORDER — GABAPENTIN 100 MG/1
300 CAPSULE ORAL EVERY 6 HOURS PRN
Status: SHIPPED
Start: 2024-11-23 | End: 2024-12-02

## 2024-11-23 RX ADMIN — Medication 12.5 MG: at 08:04

## 2024-11-23 RX ADMIN — UMECLIDINIUM 1 PUFF: 62.5 AEROSOL, POWDER ORAL at 08:05

## 2024-11-23 RX ADMIN — GABAPENTIN 300 MG: 100 CAPSULE ORAL at 12:05

## 2024-11-23 RX ADMIN — FLUTICASONE FUROATE AND VILANTEROL TRIFENATATE 1 PUFF: 100; 25 POWDER RESPIRATORY (INHALATION) at 08:05

## 2024-11-23 RX ADMIN — OXYCODONE HYDROCHLORIDE 5 MG: 5 TABLET ORAL at 15:38

## 2024-11-23 RX ADMIN — ROPINIROLE HYDROCHLORIDE 2 MG: 2 TABLET, FILM COATED ORAL at 15:38

## 2024-11-23 RX ADMIN — ROPINIROLE HYDROCHLORIDE 2 MG: 2 TABLET, FILM COATED ORAL at 08:04

## 2024-11-23 RX ADMIN — POTASSIUM CHLORIDE 20 MEQ: 750 TABLET, EXTENDED RELEASE ORAL at 08:04

## 2024-11-23 RX ADMIN — GABAPENTIN 400 MG: 400 CAPSULE ORAL at 01:24

## 2024-11-23 RX ADMIN — OXYCODONE HYDROCHLORIDE 5 MG: 5 TABLET ORAL at 12:02

## 2024-11-23 RX ADMIN — TRAZODONE HYDROCHLORIDE 50 MG: 50 TABLET ORAL at 00:49

## 2024-11-23 RX ADMIN — OXYCODONE HYDROCHLORIDE 5 MG: 5 TABLET ORAL at 08:04

## 2024-11-23 RX ADMIN — GUAIFENESIN 600 MG: 600 TABLET ORAL at 08:04

## 2024-11-23 RX ADMIN — LEVOTHYROXINE SODIUM 150 MCG: 150 TABLET ORAL at 08:04

## 2024-11-23 ASSESSMENT — ACTIVITIES OF DAILY LIVING (ADL)
ADLS_ACUITY_SCORE: 0

## 2024-11-23 NOTE — PLAN OF CARE
Goal Outcome Evaluation:      Plan of Care Reviewed With: patient    Overall Patient Progress: no changeOverall Patient Progress: no change     Admission Note    Reason for admission: Onsent of A-fib post ECT  Primary team notified of pt arrival.  Admitted from: ED  Via: Transport  Belongings: Placed in closet; medications sent to pharmacy  Admission Required Doc Completed: Yes  Teaching: Orientation to unit and call light- call light within reach, use of console, meal times, when to call for the RN, and enforced importance of safety.  IV Access: 2 R PIV  Telemetry: Yes  Ht./Wt.: Completed  Code Status verified on armband: Yes  2 RN Skin Assessment Completed with: Waylon Moreno, RN & David Ferguson RN  Suction/Ambu bag/Flowmeter at bedside: Yes     Temp:  [97.1  F (36.2  C)-97.8  F (36.6  C)] 97.7  F (36.5  C)  Pulse:  [65-85] 85  Resp:  [14-20] 18  BP: ()/(50-91) 104/61  SpO2:  [93 %-98 %] 98 % Room Air     Pt cooperative throughout shift, appears anxious  Denies SOB, chest pain, and n/t  Complains of brief dizziness and nausea when changing positions from sitting to standing, states this is not new for her  Blanchable redness on sacrum noted during 2 RN skin check  Tele monitoring d/t new onset of A-fib. Tele notified writer that pt went into normal sinus at 2042. NS rhythm continued through end of shift.  Expresses concern about wanting to take Trazodone and Gabapentin, MD notified and stated they would meet with pt.  Hesitant to take Lovenox injection but agreeable after care for A-Fib explained  Continue POC

## 2024-11-23 NOTE — PLAN OF CARE
Pt. discharged at 1730 via automobile to home.  Pt. was accompanied by friend, and left with personal belongings.  Prior to discharge, PIV was removed.  Pt. received complete discharge paperwork and will  Xarelto prescription at [facility] in [place].  Pt. was given times of last dose for all discharge medications in writing on discharge medication sheets.  Discharge teaching included new medication, pain management, activity restrictions, dressing changes, and signs and symptoms of infection.  Pt. to follow up with Psychotherapy in November 25, 2024.  Pt. had no further questions at the time of discharge and no unmet needs were identified.

## 2024-11-23 NOTE — PHARMACY-ADMISSION MEDICATION HISTORY
Pharmacist Admission Medication History    Admission medication history is complete. The information provided in this note is only as accurate as the sources available at the time of the update.    Information Source(s): Patient, Clinic records, and CareEverywhere/SureScripts via phone    Pertinent Information:   Patient was a reliable historian who knew medications well  Notable changes / changes from PTAM list include:   Recently stopped Wellbutrin and dextromethorphan (was causing tinnitus) - was supposed to restart Viibryd but did not take this morning before ECT   Takes gabapentin 400mg capsule along with three 100 mg capsules at bedtime for sleep   Takes omeprazole, allopurinol as needed   Takes oxycodone 5 mg four times throughout the day with an extra dose to be given if having difficulty controlling pain     Changes made to PTA medication list:  Added: None  Deleted: None  Changed: omeprazole to prn, allopurinol to prn, gabapentin dose (see above)     Allergies reviewed with patient and updates made in EHR: yes    Medication History Completed By: REMINGTON SERNA [facility] 11/22/2024 6:45 PM    Prior to Admission medications    Medication Sig Last Dose Taking? Auth Provider Long Term End Date   allopurinol (ZYLOPRIM) 300 MG tablet Take 1 tablet (300 mg) by mouth every morning  Patient taking differently: Take 300 mg by mouth as needed. Past Week Yes Laith Constantino MD     Cyanocobalamin (VITAMIN B-12) 5000 MCG SUBL Place 2-3 sprays under the tongue every morning. Unknown dose. 2 or 3 sprays/day 11/21/2024 Yes Alee Coker MD     ethacrynic acid (EDECRIN) 25 MG tablet Half pill every day  Patient taking differently: Take 0.5 tablets by mouth every morning. Half pill every day 11/22/2024 Yes Laith Constantino MD     Fluticasone-Umeclidin-Vilanterol (TRELEGY ELLIPTA) 200-62.5-25 MCG/ACT oral inhaler Inhale 1 puff into the lungs daily.  Patient taking differently: Inhale 1 puff into the lungs every  morning. 11/22/2024 Yes Kaushik Hobbs CNP     guaiFENesin (MUCINEX) 600 MG 12 hr tablet Take 600 mg by mouth every morning. 11/22/2024 Yes Reported, Patient     KLOR-CON M20 20 MEQ CR tablet Take 1 tablet (20 mEq) by mouth every morning. 11/22/2024 Yes Kaushik Hobbs CNP     MAGNESIUM PO Take 2 capsules by mouth 2 times daily. Strength unknown 11/22/2024 Yes Unknown, Entered By History     oxyCODONE (ROXICODONE) 5 MG tablet Take 1 tablet (5 mg) by mouth 5 times daily. May dispense 11/11 for 11/12 11/22/2024 Yes Dusty Dubois MD     rOPINIRole (REQUIP) 2 MG tablet Take 1 tablet (2 mg) by mouth 3 times daily. One tab 3 pm, one bedtime, and one during night on waking 11/21/2024 Yes Dusty Dubois MD Yes    traZODone (DESYREL) 50 MG tablet Take 0.5-1 tablets (25-50 mg) by mouth nightly as needed for sleep. 11/21/2024 Yes Jeannette Mendoza APRN CNP Yes    vilazodone (VIIBRYD) 10 MG TABS tablet Take 1 tablet (10 mg) by mouth daily. More than a month Yes Jeannette Mendoza APRN CNP Yes    vitamin C (ASCORBIC ACID) 1000 MG TABS Take 1,000 mg by mouth every morning. 11/22/2024 Yes Reported, Patient     acetaminophen (TYLENOL) 325 MG tablet Take 325-650 mg by mouth every 6 hours as needed for mild pain.   Reported, Patient     albuterol (VENTOLIN HFA) 108 (90 Base) MCG/ACT inhaler Inhale 2 puffs into the lungs every 6 hours as needed for shortness of breath, wheezing or cough   Laith Constantino MD Yes    bisacodyl (DULCOLAX) 5 MG EC tablet Two days prior to exam take two (2) tablets at 4pm. One day prior to exam take two (2) tablets at 4pm   Guru Elke Tolbert MD     gabapentin (NEURONTIN) 100 MG capsule Take 1 capsule (100 mg) by mouth every 6 hours as needed (anxiety)  Patient taking differently: Take 300 mg by mouth every 6 hours as needed (anxiety).   Jeannette Mendoza, APRN CNP Yes    gabapentin (NEURONTIN) 300 MG capsule Take 1 capsule (300 mg) by mouth nightly as needed for other  (anxiety/sleep).  Patient taking differently: Take 400 mg by mouth at bedtime. Takes at bedtime along w/ three 100mg tablets for total of 700mg nightly   Jeannette Mendoza APRN CNP Yes    ketoconazole (NIZORAL) 2 % external shampoo Apply topically daily as needed.   Reported, Patient     medical cannabis (Patient's own supply) See Admin Instructions (The purpose of this order is to document that the patient reports taking medical cannabis.  This is not a prescription, and is not used to certify that the patient has a qualifying medical condition.)  Flower   Reported, Patient     naloxone (NARCAN) 4 MG/0.1ML nasal spray Spray 1 spray (4 mg) into one nostril alternating nostrils as needed for opioid reversal every 2-3 minutes until assistance arrives   Jeannette Mendoza APRN CNP Yes    omeprazole (PRILOSEC) 20 MG DR capsule Take 1 capsule (20 mg) by mouth every morning  Patient taking differently: Take 20 mg by mouth as needed.   Laith Constantino MD No    polyethylene glycol (GOLYTELY) 236 g suspension Two days before procedure at 5PM fill first container with water. Mix and drink an 8 oz glass every 15 minutes until HALF of the container is gone. Place the remainder in the refrigerator. One day before procedure at 5PM drink second half of bowel prep. Drink an 8 oz glass every 15 minutes until it is gone. Day of procedure 6 hours before arrival time fill the 2nd container with water. Mix and drink an 8 oz glass every 15 minutes until HALF of the container is gone. Discard the remaining solution.   Guru Elke Tolbert MD     STATIN NOT PRESCRIBED (INTENTIONAL) Please choose reason not prescribed from choices below.   Laith Constantino MD Yes    SYNTHROID 150 MCG tablet Take 1 tablet (150 mcg) by mouth every morning MON to SAT 1 tablet/day; SUN 0.5 tablet  Patient taking differently: Take 150 mcg by mouth every morning. MON to SAT 1 tablet/day; SUN 0.5 tablet - confirmed dosing with patient 11/22    Laith Constantino MD Yes    UNABLE TO FIND Take 1 tablet by mouth every morning. MEDICATION NAME: CETYL-MYRISFOLEATE   Reported, Patient     vitamin D3 (CHOLECALCIFEROL) 250 mcg (52802 units) capsule Take 1 capsule by mouth once a week. SUNDAY'S 11/17/2024  Reported, Patient No

## 2024-11-23 NOTE — PROGRESS NOTES
[facility]    Medicine Progress Note - Hospitalist Service, GOLD TEAM 18    Date of Admission:  11/22/2024    Assessment & Plan   A: Patient is a 70 y/o woman who has multiple medical problems including hypertension, pulmonary hypertension, obstructive sleep apnea, COPD, hereditary hemochromatosis, chronic pain on long-term opioids, breast cancer and depression. Patient underwent ECT on 22-Nov-2024. Patient was found to have atrial fibrillation after ECT. Patient was sent to the ED and was subsequently admitted to the hospital.     Patient appears to have returned to sinus rhythm. Atrial fibrillation could be paroxysmal.    P:  1.) Atrial fibrillation, likely paroxysmal:  - Patient returned to sinus rhythm.  - [id number]-MCAh was 3.  - After discussion with patient, patient to be started on xarelto for anticoagulation. Patient to f/u with usual Cardiologist for further management.      2.) Hypotension, resolved for now:  - IV fliud boluses as needed.     3.) Pulmonary hypertension:  - To f/u with Cardiology as outpatient.  - Patient on ethacrynic acid.     4.) Depression; generalized anxiety disorder; borderline personality disorder::  - Patient underwent ECT on 22-Nov-2024.     5.) COPD, compensated:  - Monitoring for changes.     6.) Hereditary hemochromatosis; iron overload:  - Further monitoring as outpatient.     7.) Chronic pain:  - Patient on scheduled oxycodone.      8.) Graves disease with past ablation:  - Patient on levothyroxine.   - On 22-Nov-2024, TSH was 0.40.    9.) Obstructive sleep apnea:  - This is currently untreated.    10.) Restless legs syndrome:  - Patient on ropinirole.    11.) History of breast cancer s/p mastectomy and reconstruction:  - Further surveillance as outpatient.           Diet: Combination Diet Regular Diet Adult    Babcock Catheter: Not present  Lines: None     Cardiac Monitoring: ACTIVE order. Indication: Tachyarrhythmias, acute (48  "hours)  Code Status: Full Code      Clinically Significant Risk Factors Present on Admission          # Hyperchloremia: Highest Cl = 110 mmol/L in last 2 days, will monitor as appropriate              # Hypertension: Noted on problem list           # Overweight: Estimated body mass index is 26.45 kg/m  as calculated from the following:    Height as of this encounter: 1.676 m (5' 6\").    Weight as of this encounter: 74.3 kg (163 lb 14.4 oz).              Social Drivers of Health    Food Insecurity: High Risk (11/22/2024)    Food Insecurity     Within the past 12 months, did you worry that your food would run out before you got money to buy more?: Yes     Within the past 12 months, did the food you bought just not last and you didn t have money to get more?: Yes   Depression: At risk (11/21/2024)    PHQ-2     PHQ-2 Score: 4   Tobacco Use: Medium Risk (11/22/2024)    Patient History     Smoking Tobacco Use: Former     Smokeless Tobacco Use: Never     Passive Exposure: Current   Transportation Needs: High Risk (12/20/2023)    Transportation Needs     Within the past 12 months, has lack of transportation kept you from medical appointments, getting your medicines, non-medical meetings or appointments, work, or from getting things that you need?: Yes        Ciro Wan MD  Hospitalist Service, GOLD TEAM 18  [facility]  Securely message with [employer] (more info)  Text page via [facility] Paging/Directory   See signed in provider for up to date coverage information  ______________________________________________________________________    Interval History   Patient noted feeling well, noted no lightheadedness, noted no palpitations and noted no new problems.     Physical Exam   Vital Signs: Temp: 97.6  F (36.4  C) Temp src: Oral BP: 103/61 Pulse: 77   Resp: 16 SpO2: 94 % O2 Device: None (Room air) Oxygen Delivery: (S) 1 LPM  Weight: 163 lbs 14.4 oz    General: Patient comfortable, " NAD.  Heart: RRR, S1 S2 w/o murmurs. Sinus rhythm on monitor.  Lungs: Breath sounds present. No crackles/wheezes heard.  Abdomen: Soft, nontender.     Labs noted.  Sodium 144; Potassium 3.6; Creatinine 0.64;   WBC 4.4; Hb 14.9; Platelets 153;    Medical Decision Making

## 2024-11-23 NOTE — PROGRESS NOTES
X cover   Was paged by pharmacist, patient  no longer takes Wellbutrin so stopped, also not taking dextromethorphan. Apparently was supposed to start viibryd but ha snot done so so did not start  Day team to evaluate    Amy Abdul MD

## 2024-11-23 NOTE — PLAN OF CARE
Problem: Adult Inpatient Plan of Care  Goal: Plan of Care Review  Description: The Plan of Care Review/Shift note should be completed every shift.  The Outcome Evaluation is a brief statement about your assessment that the patient is improving, declining, or no change.  This information will be displayed automatically on your shift  note.  11/23/2024 0207 by Romain Barraza RN  Outcome: Progressing  11/23/2024 0207 by Romain Barraza RN  Flowsheets (Taken 11/23/2024 0207)  Plan of Care Reviewed With: patient  Overall Patient Progress: no change  11/23/2024 0206 by Romain Barraza RN  Outcome: Progressing    End of Shift Summary:       Neuro: Alert and oriented x 4. Mildly anxious at the beginning of the shift, PRN Gabapentin; PRN Trazodone given for sleep. Denies pain.   Pulm/Resp: on room air. No shortness of breath. Noted SpO2 dropped at 88-89% while sleeping- placed on nasal cannula 1 lpm. Patient refused CPAP due to her anxiety- provider notified.  CV: Afib controlled HR 60's-70's. At 0138 BP soft 93/59 map 69- provider notified, MAP goal 65. Latest /75 MAP 84  GI/: Regular diet. No nausea/vomiting. No bowel movement during the shift. continent with urine, assist of 1 with cane and gait belt to bathroom.  Access: peripheral IV on right lower and upper forearm   Skin: blanchable redness on sacrum  Gtts: none  Drains: none    Plan:   Continue POC

## 2024-11-24 NOTE — DISCHARGE SUMMARY
"[facility]  Hospitalist Discharge Summary      Date of Admission:  22-Nov-2024  Date of Discharge:   23-Nov-2024  Discharging Provider: Ciro Wan MD  Discharge Service: Hospitalist Service, GOLD TEAM 18    Discharge Diagnoses   1.) Atrial fibrillation, likely paroxysmal   2.) Hypotension   3.) Pulmonary hypertension   4.) Depression  5.) Generalized anxiety disorder  6.) Borderline personality disorder  7.) COPD  8.) Hereditary hemochromatosis  9.) Iron overload   10.) Chronic pain  11.) Graves disease with past ablation   12.) Obstructive sleep apnea   13.) Restless legs syndrome   14.) History of breast cancer     Clinically Significant Risk Factors     # Overweight: Estimated body mass index is 26.45 kg/m  as calculated from the following:    Height as of this encounter: 1.676 m (5' 6\").    Weight as of this encounter: 74.3 kg (163 lb 14.4 oz).       Follow-ups Needed After Discharge   Follow-up Appointments       Adult [facility] Follow-up and recommended labs and tests      Follow up with PCP in 3-5 days.  Follow up with Cardiology for further management of atrial fibrillation as soon as possible.  Follow up with Orthopaedic Surgery as scheduled.  Follow up with Psychiatry as scheduled.      Appointments on [place] and/or [facility] (with [facility] or [facility] provider or service). Call [phone] if you haven't heard regarding these appointments within 7 days of discharge.                Unresulted Labs Ordered in the Past 30 Days of this Admission       No orders found for last 31 day(s).            Discharge Disposition   - Patient was discharged to home in good condition.     Hospital Course   Patient is a 68 y/o woman who has multiple medical problems including hypertension, pulmonary hypertension, obstructive sleep apnea, COPD, hereditary hemochromatosis, chronic pain on long-term opioids, breast cancer and depression. Patient underwent ECT on 22-Nov-2024. " Patient was found to have atrial fibrillation after ECT. Patient was sent to the ED and was subsequently admitted to the hospital.     1.) Atrial fibrillation, likely paroxysmal:  - Patient returned to sinus rhythm during hospital stay.  - [id number] was 3.  - After discussion with patient, patient to be started on xarelto for anticoagulation. Patient to f/u with usual Cardiologist for further management.      2.) Hypotension:  - This would resolve with IV fluid boluses.     3.) Pulmonary hypertension:  - Patient was asymptomatic during hospital stay.  - To f/u with Cardiology as outpatient.     4.) Depression; generalized anxiety disorder; borderline personality disorder:  - Patient underwent ECT on 22-Nov-2024.  - After discussion, patient will resume viibryd on 24-Nov-2024.  - Patient to f/u with Psychiatry as scheduled.     5.) COPD:  - COPD appeared compensated during hospital stay.     6.) Hereditary hemochromatosis; iron overload:  - Further monitoring as outpatient.     7.) Chronic pain:  - Patient was continued on scheduled oxycodone.      8.) Graves disease with past ablation:  - Patient was continued on levothyroxine.   - On 22-Nov-2024, TSH was 0.40.     9.) Obstructive sleep apnea:  - This is currently untreated.     10.) Restless legs syndrome:  - Patient on ropinirole.     11.) History of breast cancer s/p mastectomy and reconstruction:  - Further surveillance as outpatient.     Consultations This Hospital Stay   PHARMACY LIAISON FOR MEDICATION COVERAGE CONSULT    Code Status   Full Code    Time Spent on this Encounter   - Time spent discharging patient was 35 minutes.        Ciro Wan MD  [facility] MED SURG  [address] [place] [id number]  Phone: [phone]  Fax: [phone]  ______________________________________________________________________    Physical Exam   Vital Signs: Temp: 97.6  F (36.4  C) Temp src: Oral BP: 103/61 Pulse: 77   Resp: 16 SpO2: 94 % O2  Device: None (Room air) Oxygen Delivery: (S) 1 LPM  Weight: 163 lbs 14.4 oz    General: Patient comfortable, NAD.  Heart: RRR, S1 S2 w/o murmurs. Sinus rhythm on monitor.  Lungs: Breath sounds present. No crackles/wheezes heard.  Abdomen: Soft, nontender.        Primary Care Physician   Kaushik Hobbs    Discharge Orders      Reason for your hospital stay    Atrial fibrillation     Activity    Your activity upon discharge: As tolerated.     Adult [facility] Follow-up and recommended labs and tests    Follow up with PCP in 3-5 days.  Follow up with Cardiology for further management of atrial fibrillation as soon as possible.  Follow up with Orthopaedic Surgery as scheduled.  Follow up with Psychiatry as scheduled.      Appointments on [place] and/or [facility] (with [facility] or [facility] provider or service). Call [phone] if you haven't heard regarding these appointments within 7 days of discharge.     Diet    Follow this diet upon discharge: Regular diet.       Significant Results and Procedures   - None    Discharge Medications   Discharge Medication List as of 11/23/2024  3:22 PM        START taking these medications    Details   rivaroxaban ANTICOAGULANT (XARELTO) 20 MG TABS tablet Take 1 tablet (20 mg) by mouth daily (with dinner)., Disp-30 tablet, R-0, E-Prescribe           CONTINUE these medications which have CHANGED    Details   allopurinol (ZYLOPRIM) 300 MG tablet Take 1 tablet (300 mg) by mouth as needed., No Print Out      !! gabapentin (NEURONTIN) 100 MG capsule Take 3 capsules (300 mg) by mouth every 6 hours as needed (anxiety)., No Print Out      !! gabapentin (NEURONTIN) 400 MG capsule Take 1 capsule (400 mg) by mouth at bedtime. Takes at bedtime along w/ three 100mg tablets for total of 700mg nightly, No Print Out      omeprazole (PRILOSEC) 20 MG DR capsule Take 1 capsule (20 mg) by mouth as needed., No Print Out      SYNTHROID 150 MCG tablet Take 1 tablet (150 mcg) by mouth every morning. MON  to SAT 1 tablet/day; SUN 0.5 tablet - confirmed dosing with patient 11/22, TAY, No Print Out       !! - Potential duplicate medications found. Please discuss with provider.        CONTINUE these medications which have NOT CHANGED    Details   acetaminophen (TYLENOL) 325 MG tablet Take 325-650 mg by mouth every 6 hours as needed for mild pain., Historical      albuterol (VENTOLIN HFA) 108 (90 Base) MCG/ACT inhaler Inhale 2 puffs into the lungs every 6 hours as needed for shortness of breath, wheezing or cough, Disp-18 g, R-11, E-PrescribePharmacy may dispense brand covered by insurance (Proair, or proventil or ventolin or generic albuterol inhaler)      bisacodyl (DULCOLAX) 5 MG EC tablet Two days prior to exam take two (2) tablets at 4pm. One day prior to exam take two (2) tablets at 4pm, Disp-4 tablet, R-0, E-Prescribe      Cyanocobalamin (VITAMIN B-12) 5000 MCG SUBL Place 2-3 sprays under the tongue every morning. Unknown dose. 2 or 3 sprays/day, Historical      ethacrynic acid (EDECRIN) 25 MG tablet Half pill every day, Disp-45 tablet, R-3, E-Prescribe      Fluticasone-Umeclidin-Vilanterol (TRELEGY ELLIPTA) 200-62.5-25 MCG/ACT oral inhaler Inhale 1 puff into the lungs daily., Disp-60 each, R-11, E-Prescribe      guaiFENesin (MUCINEX) 600 MG 12 hr tablet Take 600 mg by mouth every morning., Historical      ketoconazole (NIZORAL) 2 % external shampoo Apply topically daily as needed.Historical      KLOR-CON M20 20 MEQ CR tablet Take 1 tablet (20 mEq) by mouth every morning., Disp-90 tablet, R-3, TAY, E-Prescribe      MAGNESIUM PO Take 2 capsules by mouth 2 times daily. Strength unknown, Historical      medical cannabis (Patient's own supply) See Admin Instructions (The purpose of this order is to document that the patient reports taking medical cannabis.  This is not a prescription, and is not used to certify that the patient has a qualifying medical condition.)  Flower, Historical      naloxone (NARCAN) 4 MG/0.1ML  nasal spray Spray 1 spray (4 mg) into one nostril alternating nostrils as needed for opioid reversal every 2-3 minutes until assistance arrives, Disp-0.2 mL, R-0, E-PrescribePlease review administration instructions with pt on pick-up. Thanks!      oxyCODONE (ROXICODONE) 5 MG tablet Take 1 tablet (5 mg) by mouth 5 times daily. May dispense 11/11 for 11/12, Disp-70 tablet, R-0, E-Prescribe      polyethylene glycol (GOLYTELY) 236 g suspension Two days before procedure at 5PM fill first container with water. Mix and drink an 8 oz glass every 15 minutes until HALF of the container is gone. Place the remainder in the refrigerator. One day before procedure at 5PM drink second half of bowel prep.  Drink an 8 oz glass every 15 minutes until it is gone. Day of procedure 6 hours before arrival time fill the 2nd container with water. Mix and drink an 8 oz glass every 15 minutes until HALF of the container is gone. Discard the remaining solution., Disp -8000 mL, R-0, E-PrescribePharmacy may substitute for equivalent. Not a duplicate. Needs two containers for extended bowel prep      rOPINIRole (REQUIP) 2 MG tablet Take 1 tablet (2 mg) by mouth 3 times daily. One tab 3 pm, one bedtime, and one during night on waking, Disp-270 tablet, R-3, E-Prescribe      STATIN NOT PRESCRIBED (INTENTIONAL) Historical      traZODone (DESYREL) 50 MG tablet Take 0.5-1 tablets (25-50 mg) by mouth nightly as needed for sleep., Disp-30 tablet, R-0, E-Prescribe      UNABLE TO FIND Take 1 tablet by mouth every morning. MEDICATION NAME: CETYL-MYRISFOLEATE, Historical      vilazodone (VIIBRYD) 10 MG TABS tablet Take 1 tablet (10 mg) by mouth daily., Historical      vitamin C (ASCORBIC ACID) 1000 MG TABS Take 1,000 mg by mouth every morning., Historical      vitamin D3 (CHOLECALCIFEROL) 250 mcg (30158 units) capsule Take 1 capsule by mouth once a week. SUNDAY'S, Historical           Allergies   Allergies   Allergen Reactions    Serotonin Reuptake  "Inhibitors (Ssris) Anxiety, Difficulty breathing, Headache, Palpitations and Shortness Of Breath    Buspirone      The patient states she had serotonin syndrome    Cephalexin      Other reaction(s): unknown rxn.    Desvenlafaxine      Serotonin syndrome    Diclofenac Sodium [Diclofenac]      Serotonin syndrome and restless legs syndrome    Gabapentin      Drove on the wrong side of the highway    Levofloxacin      \"CAN'T REMEMBER\"    Penicillins      \"SORES IN MOUTH\"    Riluzole Difficulty breathing and Swelling    Sulfa Antibiotics      \"PT DOES NOT KNOW WHAT THE REACTION WAS\"    Topiramate Other (See Comments)     Frequent urination     "

## 2024-11-25 ENCOUNTER — TELEPHONE (OUTPATIENT)
Dept: CARDIOLOGY | Facility: CLINIC | Age: 69
End: 2024-11-25
Payer: MEDICARE

## 2024-11-25 ENCOUNTER — VIRTUAL VISIT (OUTPATIENT)
Dept: BEHAVIORAL HEALTH | Facility: CLINIC | Age: 69
End: 2024-11-25
Payer: MEDICARE

## 2024-11-25 ENCOUNTER — TELEPHONE (OUTPATIENT)
Dept: BEHAVIORAL HEALTH | Facility: CLINIC | Age: 69
End: 2024-11-25
Payer: MEDICARE

## 2024-11-25 DIAGNOSIS — F41.1 GENERALIZED ANXIETY DISORDER: ICD-10-CM

## 2024-11-25 DIAGNOSIS — F33.2 SEVERE RECURRENT MAJOR DEPRESSION WITHOUT PSYCHOTIC FEATURES (H): Primary | ICD-10-CM

## 2024-11-25 LAB
ATRIAL RATE - MUSE: 416 BPM
DIASTOLIC BLOOD PRESSURE - MUSE: NORMAL MMHG
INTERPRETATION ECG - MUSE: NORMAL
P AXIS - MUSE: NORMAL DEGREES
PR INTERVAL - MUSE: NORMAL MS
QRS DURATION - MUSE: 92 MS
QT - MUSE: 394 MS
QTC - MUSE: 457 MS
R AXIS - MUSE: 37 DEGREES
SYSTOLIC BLOOD PRESSURE - MUSE: NORMAL MMHG
T AXIS - MUSE: 17 DEGREES
VENTRICULAR RATE- MUSE: 81 BPM

## 2024-11-25 PROCEDURE — 90834 PSYTX W PT 45 MINUTES: CPT | Mod: 95 | Performed by: SOCIAL WORKER

## 2024-11-25 NOTE — TELEPHONE ENCOUNTER
Called patient back to discuss next steps. She notified that for her next ECT appointment they want her cleared by pcp and cardiology. The AFIB was a new onset but seemed to resolve during observation. At that time they planned to hold off on AC until follow-up with cardiology for decision making.     Update sent to Arlyn Stern for plan and clearance. Also if patient should establish with general car/EP since she was discharged from  clinic    Liza Reynoso RN on 11/25/2024 at 1:48 PM

## 2024-11-25 NOTE — TELEPHONE ENCOUNTER
----- Message from Archana Blanton sent at 11/25/2024  3:01 PM CST -----  Regarding: psychologist  Transition Clinic Next Level of Care Referral Request    MRN: [id number]  Patient Name:  Randi Cleary  Phone:  [phone] (home)   E-mail Address: [email or web address]    Type of patient appointment needed: Individual therapy    Provider Specialties: Specialty Services Requested: Depression, multiple health concerns, grief and loss    Service Modality:  Service Modality: Virtual    Clinician Gender Preference: female    Clinical Comments: patient has [facility].  She tells me she needs 2x a week which we can't provide long term.   Spoke there about a psychologist in the community.  Is like to ey find one for her.  Can we try [facility] (I think that's the name)    Archana Blanton, [facility] Center[facility]

## 2024-11-25 NOTE — TELEPHONE ENCOUNTER
Reached patient. Patient stated that they see Erika 1st week in Dec and talk to her about appointments. Will call back to TC if needing assistance with scheduling.     Will route to [place] to for update.    Cassidy Baker  Transition Clinic Coordinator  11/25/24 6:11 PM

## 2024-11-25 NOTE — TELEPHONE ENCOUNTER
[facility] Call Center    Phone Message    May a detailed message be left on voicemail: yes    Reason for Call: Patient called crying and in distress. Patient states she has been trying to reach out to her team and hasn't been able to get a hold of anyone. Patient does not sound like she is in the right head space. Patient has an upcoming ECT and they want her on blood thinners in 1-2 days. Please call back to address.    Thank you!  [facility]

## 2024-11-26 ENCOUNTER — PATIENT OUTREACH (OUTPATIENT)
Dept: CARE COORDINATION | Facility: CLINIC | Age: 69
End: 2024-11-26
Payer: MEDICARE

## 2024-11-26 ENCOUNTER — TELEPHONE (OUTPATIENT)
Dept: CARDIOLOGY | Facility: CLINIC | Age: 69
End: 2024-11-26
Payer: MEDICARE

## 2024-11-26 NOTE — PROGRESS NOTES
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 (Red Flag symptoms escalate to triage hotline per guidelines): Improved  Do you know how to contact your clinic care team if you have future questions or changes to your health status? : Yes  Does the patient have their discharge instructions? : Yes  Does the patient have questions regarding their discharge instructions? : No  Were you started on any new medications or were there changes to any of your previous medications? : No  Does the patient have all of their medications?: No (see comment) (Pt stated that she didnt pickup the medications becuase it's expensive, she has appt tomorrow, will talk with her doctor,Pt declined needs to talk with RN or SW to assist her.)  Do you have questions regarding any of your medications? : No  Do you have all of your needed medical supplies or equipment (DME)?  (i.e. oxygen tank, CPAP, cane, etc.): Yes    Post-op ([facility] CTA Only)  If the patient had a surgery or procedure, do they have  any questions for a nurse?: No         [facility] Explained and offered Care Coordination support to eligible patients: Yes    Patient accepted? No    Follow up Plan     Future Appointments   Date Time Provider Department Center   11/27/2024 12:30 PM [facility]   12/3/2024 10:30 AM Arsenio Jiang, PhD LP [facility]   12/4/2024  9:30 AM Erika Colorado, LMFT [facility] [place]   12/5/2024  4:30 PM Jeannette Mendoza APRN CNP URPSY [facility]   12/6/2024  9:40 AM Boo Riley MD [facility]   12/10/2024 10:30 AM Arsenio Jiang, PhD LP [facility]   12/11/2024  9:30 AM Erika Colorado, LMFT [facility] [place]   12/17/2024 10:30 AM Arsenio Jiang, PhD LP [facility]   12/18/2024  9:30 AM Erika Colorado, LMFT [facility] [place]   12/19/2024 11:30 AM [facility] Hobbs[facility]   12/23/2024  7:30 AM Erika Colorado, LMFT PHMF[facility]   1/2/2025  2:30 PM Erika Colorado, LMFT [facility] [place]   1/7/2025 10:30 AM Arsenio Jiang, PhD LP [facility]   1/9/2025  4:00 PM [facility]chana Blanton LICSW [facility]   1/13/2025  2:00 PM Archana Blanton LICSW SPT[facility]   1/14/2025 10:30 AM Arsenio Jiang, PhD LP [facility]   1/16/2025  9:45 AM Dusty Dubois MD MRPNCR [facility]   1/27/2025  1:00 PM Erika Colorado LMFT PHMFCC [facility]   2/3/2025  1:00 PM Erika Colorado, XOCHITLFT PHMFCC [facility]   2/10/2025  1:00 PM Erika Colorado LMFT [facility]   2/12/2025  2:00 PM Alee Coker MD [facility]   2/17/2025 10:30 AM Erika Colorado, LMFT [facility]   2/24/2025 10:30 AM Erika Colorado LMFT [facility]   2/25/2025  2:45 PM [facility] LAB DRAW [facility]   2/25/2025  3:15 PM Tova Pablo MD [facility]   3/5/2025  2:30 PM Erika Colorado, [facility]FT [facility]   3/12/2025  9:30 AM Erika Colorado, LMFT [facility] [place]   3/26/2025  9:30 AM Erika Colorado  M, LMFT [facility] [place]   4/2/2025  9:30 AM Erika Colorado FT [facility] [place]   4/9/2025  9:30 AM Erika Colorado FT [facility] [place]       Outpatient Plan as outlined on AVS reviewed with patient.      For any urgent concerns, please contact our 24 hour nurse triage line: [phone]     MARIAA Lao  [phone]  [facility]

## 2024-11-26 NOTE — PROGRESS NOTES
Transition Clinic Progress Note    Patient Name:   Randi Cleary Date: November 26, 2024          Service Type: Individual      VISIT TIME START: 1131      VISIT TIME END: 1152      Session Length:  TC Session Length: 45 (38-52 Minutes)    Session #:  10+    Attendees: TC Attendees: Client alone    Service Modality:  Service Modality: Video Visit:      Provider verified identity through the following two step process.  Patient provided:  Patient is known previously to provider    Telemedicine Visit: The patient's condition can be safely assessed and treated via synchronous audio and visual telemedicine encounter.      Reason for Telemedicine Visit: Patient has requested telehealth visit    Originating Site (Patient Location): Patient's home    Distant Site (Provider Location): Provider Remote Setting- Home Office    Consent:  The patient/guardian has verbally consented to: the potential risks and benefits of telemedicine (video visit) versus in person care; bill my insurance or make self-payment for services provided; and responsibility for payment of non-covered services.     Patient would like the video invitation sent by:  My Chart    Mode of Communication:  Video Conference via [facility]    Distant Location (Provider):  Off-site    As the provider I attest to compliance with applicable laws and regulations related to telemedicine.    Informed Consent and Assessment Methods    This provider and patient discussed HIPAA, and the limits of confidentiality; including mandated reporting, the possibility of collaborative discussions with patient's primary care provider and the multidisciplinary team in the MH clinic during consultation.  Discussed the no show policy, and the benefits and possible unintended consequences of therapy.  Patient indicated understanding Transition Clinic services are short term, solution focused, until a referral can be made and patient can bridge to long term therapy.  Patient  verbally indicated understanding the informed consent.     Reviewed Services offered by TC and the intent to refer and bridge to long term therapy where services can be better tailored to meet patient need's.  Patient indicated she understood she can continue with Erika Colorado at [facility] and to discuss her therapy needs with Erika or TTC and refer to community where she may receive services that has the frequency she believe she needs.     DATA  Interactive Complexity: No  Crisis: No        Progress Since Last Session (Related to Symptoms / Goals / Homework):   Symptoms: Worsening upstet over health care problems and treatment needs. Patient expressed suicidal ideations feeling hopeless for her future and her physical well being,  Homework: Completed in session      Episode of Care Goals: Satisfactory progress - CONTEMPLATION (Considering change and yet undecided); Intervened by assessing the negative and positive thinking (ambivalence) about behavior change     Current / Ongoing Stressors and Concerns:   Winnie presented emotionally distraught stating she was tired of all the medical appointments. Health care appt's and hearing she has AFIB and will need other medical care to prevent additional cardiac concerns.  Winnie states she became upset with health issues which she feels caused the Afib,  She states its annoying because now she will need to xe he cardiologist and her primary physician before she can resume ECT.  She shared fear her orthopedic doctor will not do the shoulder surgery if her has any cardiac issues. She states her the  stress and Patient expressed suicidal ideations, feeling hopeless for her future and her physical well being.  Due to her health and health care needs has caused a poor quality of life and has lead to SI.  Patient has shared many health concerns that have intensified  perhaps due to the compounded symptoms and treatment complications.      Patient has a [facility] therapist and  shared she feels she has a good relationship with her.  She states she does not feel she is getting as many sessions as she needs.  Per [facility] patient is scheduled weekly through December misses 2 weeks and then resumes care after.  Patient scheduled with this therapist is TC though she had an appointment on 11/21/2024 and then on 12/4/2024/  Winnie does not feel she can skip a week  Therapist attempted to explain TC can't provide fill in service for her and she l need to talk with erika to arrange an [facility] fill in should Erika not be avail. TC services are to complete assessments, stabilize and bridge to long term therapy.   Therapist discussed referral to psychology in the community that may meet her need for more frequens therapy session. Winnie will discuss this with Erika.      Treatment Objective(s) Addressed in This Session:   Identify negative self-talk and behaviors: challenge core beliefs, myths, and actions  Decrease thoughts that you'd be better off dead or of suicide / self-harm  Finding britney in life once again and engage in enjoyable activities that are not medical in nature.      Intervention:   Solution Focused Brief Therapy:    Brief Psychotherapy - discussed and prioritizing the most efficient treatment. and Motivational Interviewing - helping to find the motivation to make positive behavior changes.    Assessments completed prior to visit:  The following assessments were completed by patient for this visit:  PHQ9:       10/24/2024    10:46 AM 10/28/2024     9:20 PM 10/30/2024     8:45 AM 11/4/2024     2:46 PM 11/6/2024    12:50 PM 11/14/2024    11:26 AM 11/21/2024     8:31 AM   PHQ-9 SCORE   PHQ-9 Total Score MyChart 13 (Moderate depression) 12 (Moderate depression) 14 (Moderate depression) 15 (Moderately severe depression) 15 (Moderately severe depression) 14 (Moderate depression) 16 (Moderately severe depression)   PHQ-9 Total Score 13  12  14  15  15  14  16        Patient-reported     GAD7:        7/1/2024    12:10 PM 7/17/2024     9:55 AM 9/9/2024     1:33 PM 9/24/2024     9:54 AM 10/8/2024     6:29 AM 10/22/2024     8:59 AM 11/4/2024     2:38 PM   CHAVO-7 SCORE   Total Score  10 (moderate anxiety) 13 (moderate anxiety) 9 (mild anxiety) 9 (mild anxiety) 11 (moderate anxiety) 10 (moderate anxiety)   Total Score 11 10 13 9 9    9 11    11 10        Patient-reported    Multiple values from one day are sorted in reverse-chronological order     CAGE-AID:       6/22/2020     7:12 PM 1/18/2022    11:15 PM 6/6/2024     8:00 AM   CAGE-AID Total Score   Total Score 4 4 4   Total Score MyChart 4 (A total score of 2 or greater is considered clinically significant) 4 (A total score of 2 or greater is considered clinically significant)      PROMIS 10-Global Health (only subscores and total score):       10/1/2024    12:02 PM 10/17/2024    12:54 PM 10/24/2024     1:00 PM 10/30/2024     8:48 AM 11/6/2024    12:54 PM 11/14/2024    11:35 AM 11/26/2024     5:00 PM   PROMIS-10 Scores Only   Global Mental Health Score 6 6 6 7  6  6  6   Global Physical Health Score 9 9 9 9  11  10  10   PROMIS TOTAL - SUBSCORES 15 15 15 16  17  16  16       Patient-reported     [place] Suicide Severity Rating Scale (Lifetime/Recent)      6/11/2020    10:00 AM 1/9/2023     1:00 PM 5/21/2024     4:49 PM 5/21/2024     9:00 PM 6/6/2024     8:00 AM 7/10/2024    12:00 PM 11/22/2024    11:05 AM   [place] Suicide Severity Rating (Lifetime/Recent)   Q1 Wish to be Dead (Lifetime) Yes   Yes      Comments when patient was in treatment and there were family concerns   OD on medications      Q2 Non-Specific Active Suicidal Thoughts (Lifetime) Yes   No      Most Severe Ideation Rating (Lifetime) 5   5      Most Severe Ideation Description (Lifetime) when family problems were happening   OD on medication      Frequency (Lifetime) --   3      Duration (Lifetime) --   3      Controllability (Lifetime) 0   2      Protective Factors  (Lifetime) 5   4      Reasons  for Ideation (Lifetime) --   5      Q1 Wished to be Dead (Past Month)  yes 1-->yes 1-->yes 1-->yes  0-->no   Q2 Suicidal Thoughts (Past Month)  no 1-->yes 1-->yes 1-->yes  0-->no   Q3 Suicidal Thought Method  no 0-->no 0-->no 1-->yes     Q4 Suicidal Intent without Specific Plan  no 1-->yes 0-->no 0-->no     Q5 Suicide Intent with Specific Plan  no 0-->no 0-->no 1-->yes     Q6 Suicide Behavior (Lifetime)  yes 1-->yes 0-->no 1-->yes  0-->no   If yes to Q6, within past 3 months?  no 1-->yes 0-->no 0-->no     Level of Risk per Screen  moderate risk high risk moderate risk high risk  no risks indicated   RETIRED: 1. Wish to be Dead (Recent) No         RETIRED: 2. Non-Specific Active Suicidal Thoughts (Recent) No         3. Active Suicidal Ideation with any Methods (Not Plan) Without Intent to Act (Lifetime) Yes         RETIRE: Active Suicidal Ideation with any Methods (Not Plan) Description (Lifetime) 30 years prior         RETIRE: 4. Active Suicidal Ideation with Some Intent to Act, Without Specific Plan (Lifetime) Yes         RETIRE: 5. Active Suicidal Ideation with Specific Plan and Intent (Lifetime) Yes         Actual Attempt (Lifetime) Yes         Actual Attempt Description (Lifetime) patient reported overdosing on Prozac about thirty years ago         Total Number of Actual Attempts (Lifetime) 1         Actual Attempt (Past 3 Months) No         Has subject engaged in non-suicidal self-injurious behavior? (Lifetime) Yes         Has subject engaged in non-suicidal self-injurious behavior? (Past 3 Months) No         Interrupted Attempts (Lifetime) No         Interrupted Attempts (Past 3 Months) No         Aborted or Self-Interrupted Attempt (Lifetime) No         Aborted or Self-Interrupted Attempt (Past 3 Months) No         Preparatory Acts or Behavior (Lifetime) No         Preparatory Acts or Behavior (Past 3 Months) No         Most Recent Attempt Date --         Comments 30 years prior         Most Recent Attempt  Actual Lethality Code 0         Q1 Wish to be Dead (Lifetime)      N    Q2 Non-Specific Active Suicidal Thoughts (Lifetime)      N      [place] Suicide Severity Rating Scale (Short Version)      5/21/2024     9:00 PM 6/6/2024     8:00 AM 7/1/2024    12:00 PM 8/7/2024     3:00 PM 10/13/2024     4:00 PM 10/24/2024     1:00 PM 11/22/2024    11:05 AM   [place] Suicide Severity Rating (Short Version)   Q1 Wished to be Dead (Past Month) 1-->yes 1-->yes     0-->no   Q2 Suicidal Thoughts (Past Month) 1-->yes 1-->yes     0-->no   Q3 Suicidal Thought Method 0-->no 1-->yes        Q4 Suicidal Intent without Specific Plan 0-->no 0-->no        Q5 Suicide Intent with Specific Plan 0-->no 1-->yes        Q6 Suicide Behavior (Lifetime) 0-->no 1-->yes     0-->no   If yes to Q6, within past 3 months? 0-->no 0-->no        Level of Risk per Screen moderate risk high risk     no risks indicated   1. Wish to be Dead (Since Last Contact)   Y Y Y Y    Wish to be Dead Description (Since Last Contact)    tired of living for medical appts overwhelmed with medical issues Overwhelmed with health care needs.    2. Non-Specific Active Suicidal Thoughts (Since Last Contact)   Y N N N    Actual Attempt (Since Last Contact)    N      Has subject engaged in non-suicidal self-injurious behavior? (Since Last Contact)    N      Interrupted Attempts (Since Last Contact)    N      Aborted or Self-Interrupted Attempt (Since Last Contact)    N      Preparatory Acts or Behavior (Since Last Contact)    N      Suicide (Since Last Contact)    N      Calculated C-SSRS Risk Score (Since Last Contact)   Low Risk Low Risk Low Risk Low Risk          ASSESSMENT: Current Emotional / Mental Status (status of significant symptoms):   Risk status (Self / Other harm or suicidal ideation)   Patient denies current fears or concerns for personal safety.   Patient reports the following current or recent suicidal ideation or behaviors: suicidal today, presenting to the ED and  scheduling in TC. Patient discouraged, left hopeless and lost with additional health care issues ad treamten needs. .   Patient denies current or recent homicidal ideation or behaviors.   Patient denies current or recent self injurious behavior or ideation.   Patient denies other safety concerns.   Patient reports there has been no change in risk factors since their last session.     Patient reports there has been no change in protective factors since their last session.     A safety and risk management plan has been developed including: Patient consented to co-developed safety plan on 06/2024 reviewed on 11/25/2024.  Safety and risk management plan was reviewed.   Patient agreed to use safety plan should any safety concerns arise.  A copy was made available to the patient.     Appearance:   fatigued    Eye Contact:   Fair    Psychomotor Behavior: Normal  Agitated    Attitude:   Cooperative    Orientation:   All   Speech    Rate / Production: Pressured  Emotional dramatic    Volume:  Normal varied   Mood:    Angry  Depressed  Dysphoric   Affect:    Appropriate  Labile  Tearful   Thought Content:  Clear    Thought Form:  Coherent  Goal Directed    Insight:    Good  and Poor      Medication Review:   No changes to current psychiatric medication(s)     Medication Compliance:   Yes     Changes in Health Issues:   Yes: Cardiac issues, Associated Psychological Distress     Chemical Use Review:   Substance Use: Chemical use reviewed, no active concerns identified      Tobacco Use: No current tobacco use.      Diagnoses:   296.33 (F33.2) Major Depressive Disorder, Recurrent Episode, Severe _  300.02 (F41.1) Generalized Anxiety Disorder    Collateral Reports Completed:   TC Collateral: [employer] [place] electronic medical records reviewed. and No Collateral obtained.    PLAN: (Patient Tasks / Therapist Tasks / Other)  Return to [facility].  Discuss therapy frequency needs with [facility] therapist. Use the safety plan discussed and in  patient file.    Is this the patient's last discharge?: Yes. Reason for Discharge Other: discharged twice in the past. Hs established care at [facility].    Level of care: Outpatient Counseling / Psychiatry    Procedure Code: Psychotherapy (with patient) - 45 [CPT 88364]    Archana MEDEIROSAislinn AvalosCampbell, LICSW                                                       ______________________________________________________________________  [facility] Safety Plan    Creation Date: 6/4/24 Last Update Date: 11/25/24      Step 1: Warning signs:  Warning Signs   Start going down rabbit hole, thinking about the past, negative and positive memories   Missing my father   Anger / rage   Not taking care of myself      Step 2: Internal coping strategies - Things I can do to take my mind off my problems without contacting another person:  Strategies   Spirituality   Listening to music   Cat - [place]   Listening to Sidney play music   Gardening   Swimming      Step 3: People and social settings that provide distraction:  Name Contact Information   Vania Patiño       Places   [employer] pool   Going for walks outside      Step 4: People whom I can ask for help during a crisis:  Name Contact Information   Vania Little       Step 5: Professionals or agencies I can contact during a crisis:  Clinician/Agency Name Phone Emergency Contact   [facility] Crisis Line     [facility]        Suicide Prevention Lifeline Phone: Call or Text 078  Crisis Text Line: Text HOME to 892796     Step 6: Making the environment safer (plan for lethal means safety):  Maybe have Sidney help with / hold onto medications.     Optional: What is most important to me and worth living for?:  A second chance. Writer a book. Painting. Gardening. Sidney and Clifford. Getting a therapy dog.     Reggie Safety Plan. Randi Poole and Robby Barba. Used with permission of the authors.    Treatment Plan  Treatment completed or reviewed on 10/30/2024

## 2024-11-26 NOTE — TELEPHONE ENCOUNTER
[facility] Call Center    Phone Message    May a detailed message be left on voicemail: yes     Reason for Call: Other: Pt would like a call back asap as she is having anxiety regarding her appts and she is confused on who she is suppose to see and is very upset     Action Taken: Other: Cardio    Travel Screening: Not Applicable     Date of Service:

## 2024-11-27 ENCOUNTER — OFFICE VISIT (OUTPATIENT)
Dept: INTERNAL MEDICINE | Facility: CLINIC | Age: 69
End: 2024-11-27
Payer: MEDICARE

## 2024-11-27 VITALS
OXYGEN SATURATION: 97 % | DIASTOLIC BLOOD PRESSURE: 64 MMHG | TEMPERATURE: 97.4 F | BODY MASS INDEX: 25.88 KG/M2 | WEIGHT: 161 LBS | HEIGHT: 66 IN | SYSTOLIC BLOOD PRESSURE: 110 MMHG | HEART RATE: 73 BPM | RESPIRATION RATE: 16 BRPM

## 2024-11-27 DIAGNOSIS — I48.0 PAROXYSMAL ATRIAL FIBRILLATION (H): Primary | ICD-10-CM

## 2024-11-27 DIAGNOSIS — R05.9 COUGH, UNSPECIFIED TYPE: ICD-10-CM

## 2024-11-27 DIAGNOSIS — F41.8 DEPRESSION WITH ANXIETY: ICD-10-CM

## 2024-11-27 DIAGNOSIS — M62.81 GENERALIZED MUSCLE WEAKNESS: ICD-10-CM

## 2024-11-27 PROBLEM — I10 ESSENTIAL HYPERTENSION: Status: RESOLVED | Noted: 2022-06-02 | Resolved: 2024-11-27

## 2024-11-27 PROBLEM — I27.20 PULMONARY HYPERTENSION (H): Status: RESOLVED | Noted: 2019-09-24 | Resolved: 2024-11-27

## 2024-11-27 LAB
ATRIAL RATE - MUSE: 72 BPM
DIASTOLIC BLOOD PRESSURE - MUSE: NORMAL MMHG
INTERPRETATION ECG - MUSE: NORMAL
P AXIS - MUSE: 79 DEGREES
PR INTERVAL - MUSE: 178 MS
QRS DURATION - MUSE: 116 MS
QT - MUSE: 412 MS
QTC - MUSE: 451 MS
R AXIS - MUSE: 55 DEGREES
SYSTOLIC BLOOD PRESSURE - MUSE: NORMAL MMHG
T AXIS - MUSE: 41 DEGREES
VENTRICULAR RATE- MUSE: 72 BPM

## 2024-11-27 RX ORDER — BENZONATATE 100 MG/1
100 CAPSULE ORAL 3 TIMES DAILY PRN
Qty: 90 CAPSULE | Refills: 1 | Status: SHIPPED | OUTPATIENT
Start: 2024-11-27

## 2024-11-27 NOTE — PROGRESS NOTES
"  Assessment & Plan     Paroxysmal atrial fibrillation (H)  She presented to her ECT clinic for her usual ECT treatment.  During monitoring, her heart rhythm was found to be in atrial fibrillation.  She was sent to the hospital for further evaluation.  She returned to sinus rhythm during her hospital stay.  CHADS2 score is 3.  It is recommended that she be placed on anticoagulation but she has reservations about this.    We talked about getting an Apple Watch for continuous cardiac monitoring.  She will take this into consideration.    - Adult Cardiology Eval  Referral; Future  - EKG 12-lead, tracing only    Generalized muscle weakness  She is interested in pool therapy at [facility].  I will put another order in for her.  She has been having a hard time connecting with them recently  - Physical Therapy  Referral; Future    Depression with anxiety  Recently started Viibryd via psychiatry.    Patient Instructions   I put in another referral order for [facility] in [place].    Consider getting an Apple Watch for continuous cardiac monitoring.  The watch would be able to tell us if you flip into atrial fibrillation.    I put in a referral to cardiology.  Please call [phone] to make an appointment.          MED REC REQUIRED{TIP  Click the link below to document or use med rec list, use list to pull in response :380752}  Post Medication Reconciliation Status: {MED REC LIST:147135}  BMI  Estimated body mass index is 25.99 kg/m  as calculated from the following:    Height as of this encounter: 1.676 m (5' 6\").    Weight as of this encounter: 73 kg (161 lb).   {Weight Management Plan needed for ACO:711826}      {FOLLOW UP PLANS (Optional) Includes COVID19 Treatment Plan:096043}    Reinier Zhou is a 69 year old, presenting for the following health issues:  Follow Up (Hospital follow up- [facility])      11/27/2024    12:17 PM   Additional Questions   Roomed by [facility] " "Follow-up Visit:    Hospital/Nursing Home/IP Rehab Facility: [facility]  Date of Admission: 11/22/24  Date of Discharge: 11/23/24  Reason(s) for Admission:   Was the patient in the ICU or did the patient experience delirium during hospitalization?  No  Do you have any other stressors you would like to discuss with your provider? No    Problems taking medications regularly:  None  Medication changes since discharge: None  Problems adhering to non-medication therapy:  {:537969}    Summary of hospitalization:  {:982092}  Diagnostic Tests/Treatments reviewed.  Follow up needed: {:329089:}  Other Healthcare Providers Involved in Patient s Care:         {those currently involved after discharge:010078::\"None\"}  Update since discharge: {:084320} {TIP  Include information from family/caregivers, SNF, Care Coordination :086374}        Plan of care communicated with {:881999}     {Reference  Coding guidelines- Moderate Complexity F2F/Video within 7 - 14 days of discharge 3371000, High Complexity F2F/Video within 7 days [id number] or [id number] days [id number] :583668}      {additonal problems for provider to add (Optional):334270}    {ROS Picklists (Optional):408071}      Objective    /64 (BP Location: Right arm, Patient Position: Sitting)   Pulse 73   Temp 97.4  F (36.3  C)   Resp 16   Ht 1.676 m (5' 6\")   Wt 73 kg (161 lb)   LMP  (LMP Unknown)   SpO2 97%   BMI 25.99 kg/m    Body mass index is 25.99 kg/m .  Physical Exam   {Exam List (Optional):468184}    {Diagnostic Test Results (Optional):469871}        Signed Electronically by: Kaushik Hobbs CNP  {Email feedback regarding this note to [email or web address]   :961151}  "

## 2024-11-27 NOTE — PATIENT INSTRUCTIONS
I put in another referral order for [facility] in [place].    Consider getting an Apple Watch for continuous cardiac monitoring.  The watch would be able to tell us if you flip into atrial fibrillation.    I put in a referral to cardiology.  Please call [phone] to make an appointment.

## 2024-11-28 NOTE — TELEPHONE ENCOUNTER
LVM for pt regarding her inquiry on who to follow-up with.  Pt told that for her needs, it is not a PH issue, it's more of a Primary Care Provider/ Cardiology task.  I told her that if needed we could place a cardiology referral if she would like.   Staff message sent to Dr Hobbs for an FYI.    Miki Richardson RN on 11/27/2024 at 5:30 PM

## 2024-12-01 ENCOUNTER — MYC MEDICAL ADVICE (OUTPATIENT)
Dept: INTERNAL MEDICINE | Facility: CLINIC | Age: 69
End: 2024-12-01
Payer: MEDICARE

## 2024-12-01 DIAGNOSIS — I48.0 PAROXYSMAL ATRIAL FIBRILLATION (H): Primary | ICD-10-CM

## 2024-12-02 ENCOUNTER — PATIENT OUTREACH (OUTPATIENT)
Dept: CARE COORDINATION | Facility: CLINIC | Age: 69
End: 2024-12-02
Payer: MEDICARE

## 2024-12-02 ENCOUNTER — TELEPHONE (OUTPATIENT)
Dept: CARDIOLOGY | Facility: CLINIC | Age: 69
End: 2024-12-02
Payer: MEDICARE

## 2024-12-02 ENCOUNTER — TELEPHONE (OUTPATIENT)
Dept: INTERNAL MEDICINE | Facility: CLINIC | Age: 69
End: 2024-12-02
Payer: MEDICARE

## 2024-12-02 DIAGNOSIS — F33.2 SEVERE EPISODE OF RECURRENT MAJOR DEPRESSIVE DISORDER, WITHOUT PSYCHOTIC FEATURES (H): ICD-10-CM

## 2024-12-02 DIAGNOSIS — F41.1 GENERALIZED ANXIETY DISORDER: Primary | ICD-10-CM

## 2024-12-02 RX ORDER — GABAPENTIN 400 MG/1
400 CAPSULE ORAL AT BEDTIME
Qty: 30 CAPSULE | Refills: 1 | Status: SHIPPED | OUTPATIENT
Start: 2024-12-02 | End: 2024-12-05 | Stop reason: DRUGHIGH

## 2024-12-02 RX ORDER — GABAPENTIN 100 MG/1
CAPSULE ORAL
Qty: 150 CAPSULE | Refills: 1 | Status: SHIPPED | OUTPATIENT
Start: 2024-12-02 | End: 2024-12-05 | Stop reason: DRUGHIGH

## 2024-12-02 NOTE — TELEPHONE ENCOUNTER
[facility] in [facility] and called asking if she should be seen earlier for her Cardiology referral as the soonest she is able to get in is February. She states she needs to get in for her cardiology appointment before a total shoulder replacement on 01/08/2025 as it was required prior to her surgery. Please advise if referral should be placed urgent.

## 2024-12-02 NOTE — TELEPHONE ENCOUNTER
This encounter is being sent to inform the clinic that this patient has a referral from Kaushik Hobbs CNP  for the diagnoses of Paroxysmal atrial fibrillation (H) [I48.0]  and has requested that this patient be seen within 3-5 days and/or with EP providers per diagnosis.  Based on the availability of our provider(s), we are unable to accommodate this request.    Were all sites offered this patient?  Yes    Does scheduling algorithm request to schedule next available?  Patient appointment has not been scheduled.  Please review the referral request for accommodation and contact the patient.  If unable to accommodate, please resubmit a referral and indicate a preferred partner or affiliate location using Provider Finder or Scheduling Instructions field.        Please read 11/26/24 TE. Patient needing appointment for the above referral in 3-5 days. Please review and reach out to patient to schedule the above.

## 2024-12-02 NOTE — PROGRESS NOTES
Clinic Care Coordination Contact  Follow Up Progress Note      Assessment: CCC RN spoke with patient today. Patient reported she was recently hospitalized recently for A fib after a ECT treatment. Patient appeared labile during follow up conversation today. Patient expressed frustration about getting Cardiology appointment. She stated her PCP was trying to help her get an appointment.   Patient appeared to be calmer towards the end of the conversation. She has group tomorrow and plans to attend. She also will see her therapist on Tuesday and has an appointment with her psychiatric provider on Wednesday. Patient said she was grateful for the follow up called today.     Care Gaps:    Health Maintenance Due   Topic Date Due    HF ACTION PLAN  Never done    DIABETIC FOOT EXAM  Never done    COPD ACTION PLAN  Never done    HEPATITIS A IMMUNIZATION (1 of 2 - Risk 2-dose series) Never done    ZOSTER IMMUNIZATION (1 of 2) Never done    MEDICARE ANNUAL WELLNESS VISIT  Never done    COLORECTAL CANCER SCREENING  02/25/2024    A1C  11/21/2024       Scheduled with PCP on 12/19/24      Care Plans  Care Plan: Mental Health       Problem: Mental Health Symptoms Need Improvement       Goal: I would like to feel as though my mental health has improved.       Start Date: 10/16/2024 Expected End Date: 10/15/2025    Recent Progress: 50%    Priority: High    Note:     Barriers: history of anxiety, bipolar disorder, trauma related disorder  Strengths: strong advocate for herself  Patient expressed understanding of goal: yes  Action steps to achieve this goal:  1. I will continue to attend all appointments with my therapist, with my psychiatric provider and attend all ECT treatments.   2. I continue to attend my group therapy appointments.   3. I will continue to the use the skills I have learned through therapy, partial hospitalization program and therapy group.   4. I will take my medications as directed.   5. I will report progress  towards this goal at schedule outreach telephone calls from my Care Coordinator.     Discussed on 12/2/24                              Intervention/Education provided during outreach: Discussed the importance of taking her medications as directed. Encouraged her to attend upcoming appointments. Encouraged her to contact Care Coordination for any additional needs or concerns.      Outreach Frequency: monthly, more frequently as needed      Plan: CCC RN will continue to monitor, support patient with current goal and will be available to assist as nursing needs arise.     Care Coordinator will follow up in one month.

## 2024-12-02 NOTE — TELEPHONE ENCOUNTER
[facility]    Phone Message    May a detailed message be left on voicemail: yes     Reason for Call: Other: Pt called back looking for an update as she is very anxious and emotional. Writer advised that her referral was being worked on and assured her that the clinic would be calling back as soon as they were able to see what can be worked out. Please review. Thank you!     Action Taken: Message routed to:  [facility]ARDIOLOGY [facility]ULT [facility] [[id number]]    Travel Screening: Not Applicable     Date of Service:

## 2024-12-03 ENCOUNTER — PATIENT OUTREACH (OUTPATIENT)
Dept: CARE COORDINATION | Facility: CLINIC | Age: 69
End: 2024-12-03
Payer: MEDICARE

## 2024-12-03 ENCOUNTER — VIRTUAL VISIT (OUTPATIENT)
Dept: PSYCHIATRY | Facility: CLINIC | Age: 69
End: 2024-12-03
Payer: MEDICARE

## 2024-12-03 DIAGNOSIS — F33.2 SEVERE EPISODE OF RECURRENT MAJOR DEPRESSIVE DISORDER, WITHOUT PSYCHOTIC FEATURES (H): Primary | ICD-10-CM

## 2024-12-03 ASSESSMENT — ANXIETY QUESTIONNAIRES
5. BEING SO RESTLESS THAT IT IS HARD TO SIT STILL: SEVERAL DAYS
3. WORRYING TOO MUCH ABOUT DIFFERENT THINGS: MORE THAN HALF THE DAYS
GAD7 TOTAL SCORE: 17
2. NOT BEING ABLE TO STOP OR CONTROL WORRYING: MORE THAN HALF THE DAYS
6. BECOMING EASILY ANNOYED OR IRRITABLE: NEARLY EVERY DAY
4. TROUBLE RELAXING: NEARLY EVERY DAY
7. FEELING AFRAID AS IF SOMETHING AWFUL MIGHT HAPPEN: NEARLY EVERY DAY
IF YOU CHECKED OFF ANY PROBLEMS ON THIS QUESTIONNAIRE, HOW DIFFICULT HAVE THESE PROBLEMS MADE IT FOR YOU TO DO YOUR WORK, TAKE CARE OF THINGS AT HOME, OR GET ALONG WITH OTHER PEOPLE: EXTREMELY DIFFICULT
GAD7 TOTAL SCORE: 17
7. FEELING AFRAID AS IF SOMETHING AWFUL MIGHT HAPPEN: NEARLY EVERY DAY
GAD7 TOTAL SCORE: 17
8. IF YOU CHECKED OFF ANY PROBLEMS, HOW DIFFICULT HAVE THESE MADE IT FOR YOU TO DO YOUR WORK, TAKE CARE OF THINGS AT HOME, OR GET ALONG WITH OTHER PEOPLE?: EXTREMELY DIFFICULT
1. FEELING NERVOUS, ANXIOUS, OR ON EDGE: NEARLY EVERY DAY

## 2024-12-03 ASSESSMENT — ACTIVITIES OF DAILY LIVING (ADL): DEPENDENT_IADLS:: SHOPPING;CLEANING;LAUNDRY;TRANSPORTATION

## 2024-12-03 NOTE — PROGRESS NOTES
Writer called patient per her request today.  Patient answered the phone stating that she was on the other line with someone else and would call writer back.

## 2024-12-03 NOTE — PROGRESS NOTES
Clinic Care Coordination Contact  Clinic Care Coordination Contact  OUTREACH    Referral Information:  Referral Source: DION Kaplan CNP    Primary Diagnosis: Behavioral Health    Chief Complaint   Patient presents with    Clinic Care Coordination - Initial      Universal Utilization:   Clinic Utilization  Difficulty keeping appointments: No  Compliance Concerns: No  No-Show Concerns: No  No PCP office visit in Past Year: No  Utilization      No Show Count (past year)  18             ED Visits  2             Hospital Admissions  3                    Current as of: 12/3/2024 11:09 AM              Clinical Concerns:  Current Medical Concerns: Did not discuss.    Current Behavioral Concerns: [facility] contacted patient to follow up on referral placed by Jeannette Mendoza. Patient reports that she has been doing ok. She is struggling with the cost of prescriptions. [facility] again encouraged patient to look into [facility]. She hesitantly agreed, stating that she doesn't want to be paying forany extra programs. Will send this information to patient via [employer]. Patient then stated that she is fighting for a handicapped parking spot with somebody, and that she needs to go. She denies having any further social service needs at this time.    Health Maintenance Reviewed: Due/Overdue    Medication Management:  Medication review status: Did not discuss.    Functional Status:  Dependent ADLs: Ambulation-cane, Bathing  Dependent IADLs: Shopping, Cleaning, Laundry, Transportation  Bed or wheelchair confined: No  Mobility Status: Independent w/Device    Living Situation:  Current living arrangement: I live in a private home with spouse  Type of residence: Private home - stairs    Lifestyle & Psychosocial Needs:    Social Drivers of Health     Food Insecurity: High Risk (11/22/2024)    Food Insecurity     Within the past 12 months, did you worry that your food would run out before you got money to buy more?: Yes     Within the past 12  months, did the food you bought just not last and you didn t have money to get more?: Yes   Depression: At risk (12/3/2024)    PHQ-2     PHQ-2 Score: 5   Housing Stability: Low Risk  (11/22/2024)    Housing Stability     Do you have housing? : Yes     Are you worried about losing your housing?: No   Tobacco Use: Medium Risk (11/27/2024)    Patient History     Smoking Tobacco Use: Former     Smokeless Tobacco Use: Never     Passive Exposure: Current   Financial Resource Strain: Low Risk  (11/22/2024)    Financial Resource Strain     Within the past 12 months, have you or your family members you live with been unable to get utilities (heat, electricity) when it was really needed?: No   Alcohol Use: Not on file   Transportation Needs: High Risk (12/20/2023)    Transportation Needs     Within the past 12 months, has lack of transportation kept you from medical appointments, getting your medicines, non-medical meetings or appointments, work, or from getting things that you need?: Yes   Physical Activity: Not on file   Interpersonal Safety: Low Risk  (11/22/2024)    Interpersonal Safety     Do you feel physically and emotionally safe where you currently live?: Yes     Within the past 12 months, have you been hit, slapped, kicked or otherwise physically hurt by someone?: No     Within the past 12 months, have you been humiliated or emotionally abused in other ways by your partner or ex-partner?: No   Stress: Not on file   Social Connections: Not on file   Health Literacy: Not on file     Diet: Regular  Tube Feeding: No  Transportation means: Family, Regular car     Nondenominational or spiritual beliefs that impact treatment: No    Informal Support system: Family, Friends, Spouse      Resources and Interventions:  Current Resources:      Community Resources: None  Supplies Currently Used at Home: None  Equipment Currently Used at Home: grab bar, tub/shower, grab bar, toilet, shower chair, raised toilet seat  Employment Status:  disabled    Advance Care Plan/Directive  Advanced Care Plans/Directives on file: No    Referrals Placed: None    Patient/Caregiver understanding: Patient verbalized understanding, engaged in AIDET communication during patient encounter.    Outreach Frequency: monthly, more frequently as needed  Future Appointments                Today [facility] PSYCHIATRY NURSE [facility] [place]    Tomorrow Erika Colorado LMFT [facility], [facility] [place]    In 2 days Jeannette Mendoza APRN CNP [facility] Addiction [facility], [facility]    In 3 days Boo Riley MD [facility]    In 1 week Arsenio Jiang, PhD LP [facility] [place]    In 1 week Erika Colorado LMFT [facility], [facility]    In 2 weeks Arsenio Jiang, PhD LP [facility] [place]    In 2 weeks Erika Colorado LMFT [facility], [facility] [place]    In 2 weeks Kaushik Hobbs CNP [facility], [facility]    In 2 weeks Erika Colorado LMFT [facility], [facility]    In 1 month Erika Colorado LMFT [facility], [facility]    In 1 month Arsenio Jiang, PhD LP [facility] [place]    In 1 month Archana Blanton, LICSW [facility] Mental [facility] [place], [facility]    In 1 month Archana Blanton A, LICSW [facility] [place]    In 1 month Arsenio Jiang, PhD LP [facility] [place]    In 1 month Dusty Dubois MD [facility], [facility]    In 1 month Stanislav  EWELINA Mock [facility], [facility]    In 2 months Erika Colorado LMFT [facility], [facility]    In 2 months Erika Colorado LMFT [facility], [facility]    In 2 months Alee Coker MD [facility]    In 2 months Erika Colorado LMFT [facility], [facility]    In 2 months Stanislav, Erika M, LMFT [facility], [facility] JOEL    In 2 months  MASONIC LAB DRAW M [facility]    In 2 months Tova Pablo MD [facility]    In 3 months Erika Colorado LMFT [facility], [facility] [place]    In 3 months Erika Colorado LMFT [facility], [facility] [place]    In 3 months Erika Colorado LMFT [facility], [facility] [place]    In 4 months Erika Colorado LMFT [facility], [facility] [place]    In 4 months Erika Colorado LMFT [facility], [facility] [place]          Plan: Patient was encouraged to reach out with any questions or concerns.    Care Coordinator will do no further outreaches at this time.    Sola Abraham [facility]  Clinic Care Coordination  [facility]  [email or web address]  [phone]

## 2024-12-03 NOTE — PROGRESS NOTES
[facility]:  Care Coordination Note     SITUATION   Randi Cleary is a 69 year old female who is receiving support for:  Clinic Care Coordination - Follow-up      BACKGROUND     Patient called in asking for a nurse call    ASSESSMENT     Patient reports that she is really going through a crisis right now and is really struggling.  Her main concern is trying to get in for a cardiology consult as she does not want to stop ECT for a long period of time.  She notes that when she called general cardiology they told her the soonest she could get in for an appointment was in February.  She is very upset about this and is anxious regarding the whole situation.      We discussed putting in a cardiology referral and that writer would connect with the ECT team to see if they had any ideas as well.             PLAN          Nursing Interventions:  Care Coordination    Follow-up plan:  RN to follow HARRISON Johnson RN

## 2024-12-03 NOTE — TELEPHONE ENCOUNTER
Health Call Center    Phone Message    May a detailed message be left on voicemail: yes     Reason for Call: Other: Patient calling back as they have not heard from anyone yet, and patient is getting anxious about needing to get an appt in as soon as possible within a week. Advised patient that message have been sent to coordinators to review and someone will call patient back. Please review and call patient back to further discuss and coordinate.     Action Taken: Other: Cardiology    Travel Screening: Not Applicable     Thank you!  [facility]

## 2024-12-03 NOTE — TELEPHONE ENCOUNTER
New onset AF after ECT - spontaneously converted. HR is controlled & was put on AC. Next available OK. Routing to clinic coordinators to arrange.

## 2024-12-04 ENCOUNTER — VIRTUAL VISIT (OUTPATIENT)
Dept: PSYCHOLOGY | Facility: CLINIC | Age: 69
End: 2024-12-04
Payer: MEDICARE

## 2024-12-04 ENCOUNTER — TELEPHONE (OUTPATIENT)
Dept: PSYCHIATRY | Facility: CLINIC | Age: 69
End: 2024-12-04

## 2024-12-04 DIAGNOSIS — F41.1 GENERALIZED ANXIETY DISORDER: Primary | ICD-10-CM

## 2024-12-04 DIAGNOSIS — F33.2 MAJOR DEPRESSIVE DISORDER, RECURRENT SEVERE WITHOUT PSYCHOTIC FEATURES (H): ICD-10-CM

## 2024-12-04 PROCEDURE — 90837 PSYTX W PT 60 MINUTES: CPT | Mod: 95 | Performed by: MARRIAGE & FAMILY THERAPIST

## 2024-12-04 NOTE — TELEPHONE ENCOUNTER
12/3/2024 6:11PM Zandra Caballero  Patient confirmed scheduled appointment:  Date: 2/6/2025  Time: 8AM  Visit type: New EP  Provider: Dr. Laurent  Location: [address]&[place] [id number]  Testing/imaging: NA  Additional notes: 12/3 Scheduled New EP w/ Dr. Laurent 2/6/2025 and added to high priority waitlist. VLADIMIR Caballero 12/3/2024 6:11PM

## 2024-12-04 NOTE — PROGRESS NOTES
[facility]                                     Progress Note    Patient Name: Randi Cleary  Date: 2024       Service Type: Individual      Session Start Time: 9:30  Session End Time: 10:28     Session Length: 53+    Session #: 10    Attendees: Client    Service Modality:  Video Visit:      Provider verified identity through the following two step process.  Patient provided:  Patient  and Patient address    Telemedicine Visit: The patient's condition can be safely assessed and treated via synchronous audio and visual telemedicine encounter.      Reason for Telemedicine Visit: Patient has requested telehealth visit    Originating Site (Patient Location): Patient's home    Distant Site (Provider Location): Provider Remote Setting- Home Office    Consent:  The patient/guardian has verbally consented to: the potential risks and benefits of telemedicine (video visit) versus in person care; bill my insurance or make self-payment for services provided; and responsibility for payment of non-covered services.     Patient would like the video invitation sent by:  My Chart    Mode of Communication:  Video Conference via [employer]    Distant Location (Provider):  Off-site    As the provider I attest to compliance with applicable laws and regulations related to telemedicine.    DATA  Interactive Complexity: No  Crisis: No        Progress Since Last Session (Related to Symptoms / Goals / Homework):   Symptoms: No change      Homework:  n/a      Episode of Care Goals: Satisfactory progress - ACTION (Actively working towards change); Intervened by reinforcing change plan / affirming steps taken     Current / Ongoing Stressors and Concerns:   Struggling to manage symptoms and obligations.  Went into atrial fibrillation at ECT last time which has put a pause on many of her medical appointments as she waits to get into cardiology for evaluation.  Finding britney in caring for her cat.     Treatment  Objective(s) Addressed in This Session:   Distress tolerance     Intervention:   Relationship building.  Validation, encouragement.  Reflecting on emotional experiences, generational cycles.      Assessments completed prior to visit:  The following assessments were completed by patient for this visit:  PHQ9:       10/28/2024     9:20 PM 10/30/2024     8:45 AM 11/4/2024     2:46 PM 11/6/2024    12:50 PM 11/14/2024    11:26 AM 11/21/2024     8:31 AM 12/3/2024     9:10 AM   PHQ-9 SCORE   PHQ-9 Total Score MyChart 12 (Moderate depression) 14 (Moderate depression) 15 (Moderately severe depression) 15 (Moderately severe depression) 14 (Moderate depression) 16 (Moderately severe depression) 17 (Moderately severe depression)   PHQ-9 Total Score 12  14  15  15  14  16  17        Patient-reported     GAD7:       7/17/2024     9:55 AM 9/9/2024     1:33 PM 9/24/2024     9:54 AM 10/8/2024     6:29 AM 10/22/2024     8:59 AM 11/4/2024     2:38 PM 12/3/2024     9:10 AM   CHAVO-7 SCORE   Total Score 10 (moderate anxiety) 13 (moderate anxiety) 9 (mild anxiety) 9 (mild anxiety) 11 (moderate anxiety) 10 (moderate anxiety) 17 (severe anxiety)   Total Score 10 13 9 9    9 11    11 10  17        Patient-reported    Multiple values from one day are sorted in reverse-chronological order     CAGE-AID:       6/22/2020     7:12 PM 1/18/2022    11:15 PM 6/6/2024     8:00 AM   CAGE-AID Total Score   Total Score 4 4 4   Total Score MyChart 4 (A total score of 2 or greater is considered clinically significant) 4 (A total score of 2 or greater is considered clinically significant)      PROMIS 10-Global Health (all questions and answers displayed):       10/17/2024    12:54 PM 10/24/2024     1:00 PM 10/30/2024     8:48 AM 11/6/2024    12:54 PM 11/14/2024    11:35 AM 11/26/2024     5:00 PM 12/4/2024     9:28 AM   PROMIS 10   In general, would you say your health is:   Fair Fair Fair  Good   In general, would you say your quality of life is:   Poor Poor  Poor  Poor   In general, how would you rate your physical health?   Poor Good Fair  Good   In general, how would you rate your mental health, including your mood and your ability to think?   Fair Fair Fair  Fair   In general, how would you rate your satisfaction with your social activities and relationships?   Fair Poor Poor  Poor   In general, please rate how well you carry out your usual social activities and roles   Poor Poor Fair  Poor   To what extent are you able to carry out your everyday physical activities such as walking, climbing stairs, carrying groceries, or moving a chair?   Moderately Moderately Moderately  Moderately   In the past 7 days, how often have you been bothered by emotional problems such as feeling anxious, depressed, or irritable?   Often Often Often  Often   In the past 7 days, how would you rate your fatigue on average?   Moderate Moderate Moderate  Moderate   In the past 7 days, how would you rate your pain on average, where 0 means no pain, and 10 means worst imaginable pain?   7 7 7  8   In general, would you say your health is:   2  2  2   3    In general, would you say your quality of life is:   1  1  1   1    In general, how would you rate your physical health?   1  3  2   3    In general, how would you rate your mental health, including your mood and your ability to think?   2  2  2   2    In general, how would you rate your satisfaction with your social activities and relationships?   2  1  1   1    In general, please rate how well you carry out your usual social activities and roles. (This includes activities at home, at work and in your community, and responsibilities as a parent, child, spouse, employee, friend, etc.)   1  1  2   1    To what extent are you able to carry out your everyday physical activities such as walking, climbing stairs, carrying groceries, or moving a chair?   3  3  3   3    In the past 7 days, how often have you been bothered by emotional problems such  as feeling anxious, depressed, or irritable?   4  4  4   4    In the past 7 days, how would you rate your fatigue on average?   3  3  3   3    In the past 7 days, how would you rate your pain on average, where 0 means no pain, and 10 means worst imaginable pain?   7  7  7   8    Global Mental Health Score   7  6  6   6    Global Physical Health Score   9  11  10   11    PROMIS TOTAL - SUBSCORES   16  17  16   17        Information is confidential and restricted. Go to Review Flowsheets to unlock data.    Patient-reported     [place] Suicide Severity Rating Scale (Lifetime/Recent)      6/11/2020    10:00 AM 1/9/2023     1:00 PM 5/21/2024     4:49 PM 5/21/2024     9:00 PM 6/6/2024     8:00 AM 7/10/2024    12:00 PM 11/22/2024    11:05 AM   [place] Suicide Severity Rating (Lifetime/Recent)   Q1 Wish to be Dead (Lifetime) Yes   Yes      Comments when patient was in treatment and there were family concerns   OD on medications      Q2 Non-Specific Active Suicidal Thoughts (Lifetime) Yes   No      Most Severe Ideation Rating (Lifetime) 5   5      Most Severe Ideation Description (Lifetime) when family problems were happening   OD on medication      Frequency (Lifetime) --   3      Duration (Lifetime) --   3      Controllability (Lifetime) 0   2      Protective Factors  (Lifetime) 5   4      Reasons for Ideation (Lifetime) --   5      Q1 Wished to be Dead (Past Month)  yes 1-->yes 1-->yes 1-->yes  0-->no   Q2 Suicidal Thoughts (Past Month)  no 1-->yes 1-->yes 1-->yes  0-->no   Q3 Suicidal Thought Method  no 0-->no 0-->no 1-->yes     Q4 Suicidal Intent without Specific Plan  no 1-->yes 0-->no 0-->no     Q5 Suicide Intent with Specific Plan  no 0-->no 0-->no 1-->yes     Q6 Suicide Behavior (Lifetime)  yes 1-->yes 0-->no 1-->yes  0-->no   If yes to Q6, within past 3 months?  no 1-->yes 0-->no 0-->no     Level of Risk per Screen  moderate risk high risk moderate risk high risk  no risks indicated   RETIRED: 1. Wish to be Dead  (Recent) No         RETIRED: 2. Non-Specific Active Suicidal Thoughts (Recent) No         3. Active Suicidal Ideation with any Methods (Not Plan) Without Intent to Act (Lifetime) Yes         RETIRE: Active Suicidal Ideation with any Methods (Not Plan) Description (Lifetime) 30 years prior         RETIRE: 4. Active Suicidal Ideation with Some Intent to Act, Without Specific Plan (Lifetime) Yes         RETIRE: 5. Active Suicidal Ideation with Specific Plan and Intent (Lifetime) Yes         Actual Attempt (Lifetime) Yes         Actual Attempt Description (Lifetime) patient reported overdosing on Prozac about thirty years ago         Total Number of Actual Attempts (Lifetime) 1         Actual Attempt (Past 3 Months) No         Has subject engaged in non-suicidal self-injurious behavior? (Lifetime) Yes         Has subject engaged in non-suicidal self-injurious behavior? (Past 3 Months) No         Interrupted Attempts (Lifetime) No         Interrupted Attempts (Past 3 Months) No         Aborted or Self-Interrupted Attempt (Lifetime) No         Aborted or Self-Interrupted Attempt (Past 3 Months) No         Preparatory Acts or Behavior (Lifetime) No         Preparatory Acts or Behavior (Past 3 Months) No         Most Recent Attempt Date --         Comments 30 years prior         Most Recent Attempt Actual Lethality Code 0         Q1 Wish to be Dead (Lifetime)      N    Q2 Non-Specific Active Suicidal Thoughts (Lifetime)      N          ASSESSMENT: Current Emotional / Mental Status (status of significant symptoms):   Risk status (Self / Other harm or suicidal ideation)   Patient denies current fears or concerns for personal safety.   Patient reports the following current or recent suicidal ideation or behaviors: passive ideation of wanting to escape the struggles, no plan, no intent.   Patient denies current or recent homicidal ideation or behaviors.   Patient denies current or recent self injurious behavior or  ideation.   Patient denies other safety concerns.   Patient reports there has been no change in risk factors since their last session.     Patient reports there has been no change in protective factors since their last session.     A safety and risk management plan has been developed including: Patient consented to co-developed safety plan on 6/4/24.  Safety and risk management plan was reviewed.   Patient agreed to use safety plan should any safety concerns arise.  A copy was made available to the patient.     Appearance:   Appropriate    Eye Contact:   Good    Psychomotor Behavior: Normal    Attitude:   Cooperative  Pleasant   Orientation:   All   Speech    Rate / Production: Slow     Volume:  Normal    Mood:    Normal   Affect:    Appropriate  Subdued    Thought Content:  Clear    Thought Form:  Coherent  Logical    Insight:    Good      Medication Review:   No changes to current psychiatric medication(s)     Medication Compliance:   Yes     Changes in Health Issues:   None reported     Chemical Use Review:   Substance Use: Chemical use reviewed, no active concerns identified      Tobacco Use: No current tobacco use.      Diagnosis:  1. Generalized anxiety disorder    2. Major depressive disorder, recurrent severe without psychotic features (H)        Collateral Reports Completed:   Not Applicable    PLAN: (Patient Tasks / Therapist Tasks / Other)  Focus on self-care, distress tolerance.  Therapist will consult with her other social work providers regarding case management.      EWELINA Billingsley                                                         ______________________________________________________________________    Individual Treatment Plan    Patient's Name: Randi Cleary  YOB: 1955    Date of Creation: 7/17/2024  Date Treatment Plan Last Reviewed/Revised: 7/17/2024, 10/30/24     DSM5 Diagnoses:   296.33 (F33.2) Major Depressive Disorder, Recurrent Episode, Severe   300.02  (F41.1) Generalized Anxiety Disorder  Psychosocial / Contextual Factors: history of trauma  PROMIS (reviewed every 90 days):     Referral / Collaboration:  Discussed and patient will pursue a therapy group for depressive symptoms.    Anticipated number of session for this episode of care: 9-12 sessions  Anticipation frequency of session: Weekly  Anticipated Duration of each session: 38-52 minutes  Treatment plan will be reviewed in 90 days or when goals have been changed.       MeasurableTreatment Goal(s) related to diagnosis / functional impairment(s)  Goal 1: Client will keep self safe and eliminate suicidal ideation and self-harm behaviors.    Objective #A (Client Action)    Client will make a list of at least 10 skills or activities that you will to use to distract from urges to harm self.  Status: Completed - Date: 10/30/24       Intervention(s)  Therapist will provide ideas and strategies for generating coping skills list.    Objective #B  Client will make a list of pros and cons for tolerating and not tolerating an urge to harm self.  Status: Continued - Date(s):10/30/24      Intervention(s)  Therapist will guide client through DBT-style Pros/Cons list for behavior change.    Objective #C  Client will identify and practice the new strategies for dealing with strong emotions, learn and practice relaxation breathing.  Status: Continued - Date(s):10/30/24      Intervention(s)  Therapist will teach distraction skills. Practice them within session and outside of session.    Goal 2: Client will report reduction in anxiety also as evidenced by reduction of CHAVO-7 score below 6 points within the next 12 weeks.    Objective #A (Client Action)    Client will identify at least three triggers for anxiety.  Status: Completed - Date: 10/30/24       Intervention(s)  Therapist will provide educational materials on common triggers and signs of anxiety.    Objective #B  Client will use relaxation strategies at least two times  per day to reduce the physical symptoms of anxiety.  Status: Continued - Date(s):10/30/24      Intervention(s)  Therapist will teach relaxation strategies such as mindfulness, deep breathing, muscle relaxation, and sensory activities.    Objective #C  Client will use cognitive strategies identified in therapy to challenge anxious thoughts.  Status: Continued - Date(s):10/30/24      Intervention(s)  Therapist will teach strategies for cognitive modification using REBT model.    Goal 3: Client will report improved mood as indicated by PHQ-9 score below 6 for consistent 8 weeks.    Objective #A (Client Action)    Status: Continued - Date: 10/30/24     Client will Increase interest, engagement, and pleasure in doing things.    Intervention(s)  Therapist will assign homework for daily involvement in pleasant activities.    Objective #B  Client will Identify negative self-talk and behaviors: challenge core beliefs, myths, and actions.    Status: Continued - Date(s):10/30/24      Intervention(s)  Therapist will teach strategies for cognitive modification using REBT models.    Objective #C  Client will Improve quantity and quality of night time sleep / decrease daytime naps.  Status: Continued - Date(s):10/30/24      Intervention(s)  Therapist will teach sleep hygiene strategies.  Assign homework for daily practice.    Goal 4: Client will report reduced or eliminated physiological activation when recalling a distressing event including decreased re-experiencing, decreased avoidance, and decreased hyperarousal.    Objective #A (Client Action)    Status:  Continued: 10/30/24       Client will acquire knowledge of trauma, common symptoms post-trauma, emotion regulation strategies, stress management strategies, and cognitive coping strategies.    Intervention(s)  Therapist will teach about effects of trauma on mind and body; encourage emotion identification and expression; practice with client the stress management strategies;  and teach cognitive modification.    Objective #B  Client will use Brainspotting while focusing on physiological sensations related to distressing events.  Client will assess and rate level of physiological activation.  Status: Continued - Date(s):10/30/24     Intervention(s)  Therapist will provide use a pointer or other materials to assist client in holding a brainspot in their visual field.  Therapist might also provide biolateral music through headphones to deepen the experience.         Patient has reviewed and agreed to the above plan.      Erika Colorado, LMFT  July 17, 2024

## 2024-12-04 NOTE — TELEPHONE ENCOUNTER
Patient calling regarding ECT appointment on Friday.  Patient was able to get a cardiology appointment for Thursday 12/5/24 at 09:40 am at the [facility].  The ECT department will call patient on Friday after reviewing cardiology notes from appointment.  Patient was informed to ask cardiology provider to document that patient is cleared to proceed with ECT if cleared by cardiology.  Patient verbalized understanding of instructions.

## 2024-12-05 ENCOUNTER — OFFICE VISIT (OUTPATIENT)
Dept: CARDIOLOGY | Facility: CLINIC | Age: 69
End: 2024-12-05
Payer: MEDICARE

## 2024-12-05 ENCOUNTER — VIRTUAL VISIT (OUTPATIENT)
Dept: PSYCHIATRY | Facility: CLINIC | Age: 69
End: 2024-12-05
Payer: MEDICARE

## 2024-12-05 VITALS
OXYGEN SATURATION: 90 % | HEART RATE: 79 BPM | WEIGHT: 166.2 LBS | BODY MASS INDEX: 26.83 KG/M2 | DIASTOLIC BLOOD PRESSURE: 74 MMHG | SYSTOLIC BLOOD PRESSURE: 117 MMHG

## 2024-12-05 DIAGNOSIS — F41.1 GENERALIZED ANXIETY DISORDER: ICD-10-CM

## 2024-12-05 DIAGNOSIS — I48.91 NEW ONSET A-FIB (H): Primary | ICD-10-CM

## 2024-12-05 DIAGNOSIS — F33.2 SEVERE EPISODE OF RECURRENT MAJOR DEPRESSIVE DISORDER, WITHOUT PSYCHOTIC FEATURES (H): Primary | Chronic | ICD-10-CM

## 2024-12-05 DIAGNOSIS — G47.00 PERSISTENT INSOMNIA: ICD-10-CM

## 2024-12-05 DIAGNOSIS — F43.10 PTSD (POST-TRAUMATIC STRESS DISORDER): ICD-10-CM

## 2024-12-05 DIAGNOSIS — G47.33 OSA (OBSTRUCTIVE SLEEP APNEA): ICD-10-CM

## 2024-12-05 PROCEDURE — 93246 EXT ECG>7D<15D RECORDING: CPT

## 2024-12-05 PROCEDURE — G0463 HOSPITAL OUTPT CLINIC VISIT: HCPCS

## 2024-12-05 RX ORDER — TRAZODONE HYDROCHLORIDE 50 MG/1
50-100 TABLET, FILM COATED ORAL
Qty: 60 TABLET | Refills: 1 | Status: SHIPPED | OUTPATIENT
Start: 2024-12-05

## 2024-12-05 RX ORDER — GABAPENTIN 100 MG/1
100-300 CAPSULE ORAL DAILY PRN
Qty: 90 CAPSULE | Refills: 0 | Status: SHIPPED | OUTPATIENT
Start: 2024-12-05

## 2024-12-05 RX ORDER — GABAPENTIN 300 MG/1
300-600 CAPSULE ORAL AT BEDTIME
Qty: 60 CAPSULE | Refills: 1 | Status: SHIPPED | OUTPATIENT
Start: 2024-12-05

## 2024-12-05 RX ORDER — VILAZODONE HYDROCHLORIDE 20 MG/1
20 TABLET ORAL
Qty: 30 TABLET | Refills: 1 | Status: SHIPPED | OUTPATIENT
Start: 2024-12-05

## 2024-12-05 ASSESSMENT — PAIN SCALES - GENERAL: PAINLEVEL_OUTOF10: NO PAIN (0)

## 2024-12-05 NOTE — PROGRESS NOTES
Depression Response    Patient completed the PHQ-9 assessment for depression and scored >9? Yes  Question 9 on the PHQ-9 was positive for suicidality? No  Does patient have current mental health provider? Yes    Is this a virtual visit? Yes   Does patient have suicidal ideation (positive question 9)? No - offer to place Mental Health Referral.  Patient declined referral/not needed    I personally notified the following: visit provider    Randi RIVERA Cathy Evin is a 69 year old who is being evaluated via a billable video visit.      Pt will join video visit via: [employer]  If there are problems joining the visit, send backup video invite via: Text to preferred phone: [phone]    Originating location (patient location): Patient's home    Will anyone else be joining the visit? No      Answers submitted by the patient for this visit:  Patient Health Questionnaire (Submitted on 12/5/2024)  If you checked off any problems, how difficult have these problems made it for you to do your work, take care of things at home, or get along with other people?: Extremely difficult  PHQ9 TOTAL SCORE: 17

## 2024-12-05 NOTE — LETTER
12/5/2024      RE: Randi Cleary  [address]  [facility] [address]       Dear Colleague,    Thank you for the opportunity to participate in the care of your patient, Randi Cleary, at the [facility] [place] [facility]. Please see a copy of my visit note below.        ELECTROPHYSIOLOGY CLINIC VISIT    Assessment/Recommendations   Assessment/Plan:    Randi Cleary is a 69 year old female with past medical history significant for pulmonary hypertension, treatment resistant depression (undergoing ECT), anxiety, borderline personality disorder, COPD, hereditary hemochromatosis, iron overload, graves disease, pheochromocytoma s/p surgical removal, restless leg syndrome, hx of breast cancer s/p mastectomy and reconstruction, hx of alcohol use disorder,chronic pain, paroxysmal atrial fibrillation.     Paroxysmal Atrial Fibrillation   I had a long discussion with the patient about AF, including the natural history of the disease, risk of embolic events, role of antithrombotic therapy, role of rate control therapy, the role of antiarrhythmic medications, and the role of an ablation. We discussed the AFFIRM trial.  1. Stroke Prophylaxis: CHADSVASC 2 +age, +female. She is not on antihypertensives, BP today 117/74. 2, corresponding to a 2.2% annual stroke / systemic embolism event rate. She never started the Xarelto. We discussed she can continue off anticoagulation given score of 2. We did discuss that when she turns 74 yo, anticoagulation would be recommended indefinitely, she voices understanding of this.   2. Rate Control: None.   3. Rhythm Control: Cardioversion, Antiarrhythmics and/or ablation are options for rhythm control. Patient with one episode of AF 11/22/24 that spontaneously converted several hours later. She was minimally symptomatic with this, rates 70s. Will have her complete zio monitor to assess burden. If zio without  AF, no further rhythm control needed. Can consider further rhythm control if she has an increase in frequency of episodes. She is ok to proceed with her ECT treatments as scheduled.   4. Risk Factor Management: Statin, BP control, and KYAW evaluation as indicated. Was previously diagnosed with KYAW, doesn't use/have CPAP. Will have her follow up with sleep medicine.     Complete zio.   Follow up in 1 year.      History of Present Illness/Subjective    MsAislinn Cleary is a 69 year old female who comes in today for EP consultation of AF.    Randi Cleary is a 69 year old female with past medical history significant for pulmonary hypertension, treatment resistant depression (undergoing ECT), anxiety, borderline personality disorder, COPD, hereditary hemochromatosis, iron overload, graves disease, pheochromocytoma s/p surgical removal, restless leg syndrome, hx of breast cancer s/p mastectomy and reconstruction, hx of alcohol use disorder,chronic pain, paroxysmal atrial fibrillation.   She has followed with PH team for management, last RHC done 9/2024 which was normal, she reported she did not want further apts with them at this time, follow up as needed was planned.     Patient presented to undergo scheduled ECT on 11/22/24, noted by anesthesiology that she had gone into new onset atrial fibrillation. She was admitted, spontaneously converted to sinus rhythm, echo done 11/23 with LVEF 55-60%, normal RV function, mild left atrial enlargement. She was discharged the next day on Xarelto 20 mg daily.     She presents today to establish care regarding new A fib diagnosis. She reports feeling well. She did not have many symptoms with the A fib and was unaware she was in it at the time. She is planning to get an apple watch so she is able to monitor further at home. Her ECT treatment has been cancelled for this week until she was evaluated by cardiology team. She denies chest discomfort, palpitations, abdominal  fullness/bloating or peripheral edema, shortness of breath, orthopnea, lightheadedness, dizziness, pre-syncope, or syncope. Presenting 12 lead ECG shows sinus Vent Rate 75 bpm,  ms,  ms, QTc 431 ms.     Family history:     - Father:  heart attack at 54 yo, untreated hemachromatosis     - Mom: no cardiac hx   I have reviewed and updated the patient's Past Medical History, Social History, Family History and Medication List.     Cardiographics (Personally Reviewed) :   Echo: 2024   Interpretation Summary  Global and regional left ventricular function is normal with an EF of 55-60%.  Right ventricular function, chamber size, wall motion, and thickness are  normal.  No significant valvular abnormalities were noted.  This study was compared with the study from 10/5/23 .  No significant changes noted.    RHC/LHC: 24     Right sided filling pressures are normal.     Left sided filling pressures are normal.     Normal PA pressures.     Normal cardiac output level.    Echo: 10/05/2023   Interpretation Summary  Global and regional left ventricular function is normal with an EF of 55-60%  with normal size and thickness.  Global right ventricular function and size are normal.  Trace to mild aortic insufficiency is present.  No pericardial effusion is present.  This study was compared with the study from 19 .  No significant changes noted.    NM Stress Test: 21     Pharmacological regadenoson stress ECG is negative for inducible myocardial ischemia.     Pharmacological regadenoson nuclear stress test is probably abnormal. There is a very small sized induced myocardial ischemia in distal anterolateral segment.  There is no previous myocardial infarction.     Normal left ventricular size, wall motion and systolic function.  The calculated left ventricular ejection fraction is 68%.       Physical Examination   /74 (BP Location: Right arm, Patient Position: Chair, Cuff Size: Adult Regular)    Pulse 79   Wt 75.4 kg (166 lb 3.2 oz)   LMP  (LMP Unknown)   SpO2 90%   BMI 26.83 kg/m    Wt Readings from Last 3 Encounters:   12/05/24 75.4 kg (166 lb 3.2 oz)   11/27/24 73 kg (161 lb)   11/23/24 74.3 kg (163 lb 14.4 oz)     General Appearance:   Alert, well-appearing and in no acute distress.   HEENT: Atraumatic, normocephalic. MMM.   Chest/Lungs:   Respirations unlabored.  Lungs are clear to auscultation.   Cardiovascular:   Regular rate and rhythm.  S1/S2. No murmur.    Abdomen:  Soft, nontender, nondistended.   Extremities: No cyanosis or clubbing. No edema.    Musculoskeletal: Moves all extremities.     Skin: Warm, dry, intact.    Neurologic: Mood and affect are appropriate.  Alert and oriented to person, place, time, and situation.          Medications  Allergies   Current Outpatient Medications   Medication Sig Dispense Refill     acetaminophen (TYLENOL) 325 MG tablet Take 325-650 mg by mouth every 6 hours as needed for mild pain.       albuterol (VENTOLIN HFA) 108 (90 Base) MCG/ACT inhaler Inhale 2 puffs into the lungs every 6 hours as needed for shortness of breath, wheezing or cough 18 g 11     allopurinol (ZYLOPRIM) 300 MG tablet Take 1 tablet (300 mg) by mouth as needed.       benzonatate (TESSALON) 100 MG capsule Take 1 capsule (100 mg) by mouth 3 times daily as needed for cough. 90 capsule 1     Cyanocobalamin (VITAMIN B-12) 5000 MCG SUBL Place 2-3 sprays under the tongue every morning. Unknown dose. 2 or 3 sprays/day       Fluticasone-Umeclidin-Vilanterol (TRELEGY ELLIPTA) 200-62.5-25 MCG/ACT oral inhaler Inhale 1 puff into the lungs daily. (Patient taking differently: Inhale 1 puff into the lungs every morning.) 60 each 11     gabapentin (NEURONTIN) 100 MG capsule Take 3 capsules (300 mg) by mouth at bedtime. May also take 3 capsules (300 mg) every 6 hours as needed (anxiety). 150 capsule 1     gabapentin (NEURONTIN) 400 MG capsule Take 1 capsule (400 mg) by mouth at bedtime. Take at bedtime  along w/ three 100mg tablets for total of 700mg nightly 30 capsule 1     guaiFENesin (MUCINEX) 600 MG 12 hr tablet Take 600 mg by mouth every morning.       ketoconazole (NIZORAL) 2 % external shampoo Apply topically daily as needed.       KLOR-CON M20 20 MEQ CR tablet Take 1 tablet (20 mEq) by mouth every morning. 90 tablet 3     MAGNESIUM PO Take 2 capsules by mouth 2 times daily. Strength unknown       medical cannabis (Patient's own supply) See Admin Instructions (The purpose of this order is to document that the patient reports taking medical cannabis.  This is not a prescription, and is not used to certify that the patient has a qualifying medical condition.)  Flower       naloxone (NARCAN) 4 MG/0.1ML nasal spray Spray 1 spray (4 mg) into one nostril alternating nostrils as needed for opioid reversal every 2-3 minutes until assistance arrives 0.2 mL 0     omeprazole (PRILOSEC) 20 MG DR capsule Take 1 capsule (20 mg) by mouth as needed.       oxyCODONE (ROXICODONE) 5 MG tablet Take 1 tablet (5 mg) by mouth 5 times daily. May dispense/start 11/29/24 70 tablet 0     rOPINIRole (REQUIP) 2 MG tablet Take 1 tablet (2 mg) by mouth 3 times daily. One tab 3 pm, one bedtime, and one during night on waking 270 tablet 3     STATIN NOT PRESCRIBED (INTENTIONAL) Please choose reason not prescribed from choices below.       SYNTHROID 150 MCG tablet Take 1 tablet (150 mcg) by mouth every morning. MON to SAT 1 tablet/day; SUN 0.5 tablet - confirmed dosing with patient 11/22       traZODone (DESYREL) 50 MG tablet Take 0.5-1 tablets (25-50 mg) by mouth nightly as needed for sleep. 30 tablet 0     UNABLE TO FIND Take 1 tablet by mouth every morning. MEDICATION NAME: CETYL-MYRISFOLEATE       vilazodone (VIIBRYD) 10 MG TABS tablet Take 1 tablet (10 mg) by mouth daily.       vitamin C (ASCORBIC ACID) 1000 MG TABS Take 1,000 mg by mouth every morning.       vitamin D3 (CHOLECALCIFEROL) 250 mcg (31510 units) capsule Take 1 capsule by  "mouth once a week. SUNDAY'S       No current facility-administered medications for this visit.    Allergies   Allergen Reactions     Serotonin Reuptake Inhibitors (Ssris) Anxiety, Difficulty breathing, Headache, Palpitations and Shortness Of Breath     Buspirone      The patient states she had serotonin syndrome     Cephalexin      Other reaction(s): unknown rxn.     Desvenlafaxine      Serotonin syndrome     Diclofenac Sodium [Diclofenac]      Serotonin syndrome and restless legs syndrome     Gabapentin      Drove on the wrong side of the highway     Levofloxacin      \"CAN'T REMEMBER\"     Penicillins      \"SORES IN MOUTH\"     Riluzole Difficulty breathing and Swelling     Sulfa Antibiotics      \"PT DOES NOT KNOW WHAT THE REACTION WAS\"     Topiramate Other (See Comments)     Frequent urination         Lab Results (Personally Reviewed)    Chemistry/lipid CBC Cardiac Enzymes/BNP/TSH/INR   Lab Results   Component Value Date    BUN 12.0 11/23/2024     11/23/2024    CO2 25 11/23/2024     Creatinine   Date Value Ref Range Status   11/23/2024 0.64 0.51 - 0.95 mg/dL Final   02/05/2021 0.64 0.52 - 1.04 mg/dL Final       Lab Results   Component Value Date    CHOL 205 (H) 08/06/2024    HDL 63 08/06/2024     (H) 08/06/2024      Lab Results   Component Value Date    WBC 4.4 11/23/2024    HGB 14.9 11/23/2024    HCT 43.2 11/23/2024    MCV 95 11/23/2024     11/23/2024    Lab Results   Component Value Date    TROPONINI 0.01 06/24/2019    BNP 14 06/24/2019    TSH 0.40 11/22/2024    INR 0.99 11/23/2024        The patient states understanding and is agreeable with the plan.     Latonia Saini PA-C  [facility]  Electrophysiology Consult Service  Pager: [phone]    I spent a total of 25 minutes face to face with Randi Cleary during today's office visit. I have spent an additional 20 minutes today on chart review and documentation.                     Please do not hesitate to contact me if " you have any questions/concerns.     Sincerely,     Latonia Saini PA-C

## 2024-12-05 NOTE — NURSING NOTE
Randi RIVERA Cathy Evin arrived here on 12/5/2024 10:41 AM for 8-14 Days  Zio monitor placement per ordering provider Geib for the diagnosis a-fib.  Dr. Resendiz is the supervising MD. Patient s skin was prepped per protocol.  Zio monitor was placed.  Instructions were reviewed with and given to the patient.  Patient verbalized understanding of wear, troubleshooting and monitor return instructions.

## 2024-12-05 NOTE — PATIENT INSTRUCTIONS
You were seen in the Electrophysiology Clinic today by: VERONICA Quintanilla     Plan:   Medication Changes:   None     Labs/Tests Needed:  Complete zio monitor     Follow up Visit:  Follow up in 1 year       If you have further questions, please utilize Present to contact us.     Your Care Team:    EP Cardiology   Telephone Number     Nurse Line  Cliff Mullins RN   [phone]     For scheduling appointments:   Adriana   [phone]   For procedure scheduling:    Mabel Leigh     [phone]   For the Device Clinic (Pacemakers, ICDs, Loop Recorders)    During business hours: [phone]  After business hours:   [phone]- select option 4 and ask for job code [id number].       On-call cardiologist for after hours or on weekends:   [phone], option #4, and ask to speak to the on-call cardiologist.     Cardiovascular Clinic:   [address] [place] [address]. [place] [address]      As always, Thank you for trusting us with your health care needs!

## 2024-12-05 NOTE — NURSING NOTE
Chief Complaint   Patient presents with    New Patient     NEW EP d/t Afib     Vitals were taken, medications reconciled, and EKG was performed.    Olvin Sen EMT  9:45 AM
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
## 2024-12-05 NOTE — PROGRESS NOTES
ELECTROPHYSIOLOGY CLINIC VISIT    Assessment/Recommendations   Assessment/Plan:    Randi Cleary is a 69 year old female with past medical history significant for pulmonary hypertension, treatment resistant depression (undergoing ECT), anxiety, borderline personality disorder, COPD, hereditary hemochromatosis, iron overload, graves disease, pheochromocytoma s/p surgical removal, restless leg syndrome, hx of breast cancer s/p mastectomy and reconstruction, hx of alcohol use disorder,chronic pain, paroxysmal atrial fibrillation.     Paroxysmal Atrial Fibrillation   I had a long discussion with the patient about AF, including the natural history of the disease, risk of embolic events, role of antithrombotic therapy, role of rate control therapy, the role of antiarrhythmic medications, and the role of an ablation. We discussed the AFFIRM trial.  1. Stroke Prophylaxis: CHADSVASC 2 +age, +female. She is not on antihypertensives, BP today 117/74. 2, corresponding to a 2.2% annual stroke / systemic embolism event rate. She never started the Xarelto. We discussed she can continue off anticoagulation given score of 2. We did discuss that when she turns 74 yo, anticoagulation would be recommended indefinitely, she voices understanding of this.   2. Rate Control: None.   3. Rhythm Control: Cardioversion, Antiarrhythmics and/or ablation are options for rhythm control. Patient with one episode of AF 11/22/24 that spontaneously converted several hours later. She was minimally symptomatic with this, rates 70s. Will have her complete zio monitor to assess burden. If zio without AF, no further rhythm control needed. Can consider further rhythm control if she has an increase in frequency of episodes. She is ok to proceed with her ECT treatments as scheduled.   4. Risk Factor Management: Statin, BP control, and KYAW evaluation as indicated. Was previously diagnosed with KYAW, doesn't use/have CPAP. Will have her follow up with  sleep medicine.     Complete zio.   Follow up in 1 year.      History of Present Illness/Subjective    MsAislinn Cleary is a 69 year old female who comes in today for EP consultation of AF.    Randi Cleary is a 69 year old female with past medical history significant for pulmonary hypertension, treatment resistant depression (undergoing ECT), anxiety, borderline personality disorder, COPD, hereditary hemochromatosis, iron overload, graves disease, pheochromocytoma s/p surgical removal, restless leg syndrome, hx of breast cancer s/p mastectomy and reconstruction, hx of alcohol use disorder,chronic pain, paroxysmal atrial fibrillation.   She has followed with PH team for management, last RHC done 2024 which was normal, she reported she did not want further apts with them at this time, follow up as needed was planned.     Patient presented to undergo scheduled ECT on 24, noted by anesthesiology that she had gone into new onset atrial fibrillation. She was admitted, spontaneously converted to sinus rhythm, echo done  with LVEF 55-60%, normal RV function, mild left atrial enlargement. She was discharged the next day on Xarelto 20 mg daily.     She presents today to establish care regarding new A fib diagnosis. She reports feeling well. She did not have many symptoms with the A fib and was unaware she was in it at the time. She is planning to get an apple watch so she is able to monitor further at home. Her ECT treatment has been cancelled for this week until she was evaluated by cardiology team. She denies chest discomfort, palpitations, abdominal fullness/bloating or peripheral edema, shortness of breath, orthopnea, lightheadedness, dizziness, pre-syncope, or syncope. Presenting 12 lead ECG shows sinus Vent Rate 75 bpm,  ms,  ms, QTc 431 ms.     Family history:     - Father:  heart attack at 54 yo, untreated hemachromatosis     - Mom: no cardiac hx   I have reviewed and updated  the patient's Past Medical History, Social History, Family History and Medication List.     Cardiographics (Personally Reviewed) :   Echo: 11/23/2024   Interpretation Summary  Global and regional left ventricular function is normal with an EF of 55-60%.  Right ventricular function, chamber size, wall motion, and thickness are  normal.  No significant valvular abnormalities were noted.  This study was compared with the study from 10/5/23 .  No significant changes noted.    RHC/LHC: 9/25/24    Right sided filling pressures are normal.    Left sided filling pressures are normal.    Normal PA pressures.    Normal cardiac output level.    Echo: 10/05/2023   Interpretation Summary  Global and regional left ventricular function is normal with an EF of 55-60%  with normal size and thickness.  Global right ventricular function and size are normal.  Trace to mild aortic insufficiency is present.  No pericardial effusion is present.  This study was compared with the study from 8/13/19 .  No significant changes noted.    NM Stress Test: 11/12/21    Pharmacological regadenoson stress ECG is negative for inducible myocardial ischemia.    Pharmacological regadenoson nuclear stress test is probably abnormal. There is a very small sized induced myocardial ischemia in distal anterolateral segment.  There is no previous myocardial infarction.    Normal left ventricular size, wall motion and systolic function.  The calculated left ventricular ejection fraction is 68%.       Physical Examination   /74 (BP Location: Right arm, Patient Position: Chair, Cuff Size: Adult Regular)   Pulse 79   Wt 75.4 kg (166 lb 3.2 oz)   LMP  (LMP Unknown)   SpO2 90%   BMI 26.83 kg/m    Wt Readings from Last 3 Encounters:   12/05/24 75.4 kg (166 lb 3.2 oz)   11/27/24 73 kg (161 lb)   11/23/24 74.3 kg (163 lb 14.4 oz)     General Appearance:   Alert, well-appearing and in no acute distress.   HEENT: Atraumatic, normocephalic. MMM.   Chest/Lungs:    Respirations unlabored.  Lungs are clear to auscultation.   Cardiovascular:   Regular rate and rhythm.  S1/S2. No murmur.    Abdomen:  Soft, nontender, nondistended.   Extremities: No cyanosis or clubbing. No edema.    Musculoskeletal: Moves all extremities.     Skin: Warm, dry, intact.    Neurologic: Mood and affect are appropriate.  Alert and oriented to person, place, time, and situation.          Medications  Allergies   Current Outpatient Medications   Medication Sig Dispense Refill    acetaminophen (TYLENOL) 325 MG tablet Take 325-650 mg by mouth every 6 hours as needed for mild pain.      albuterol (VENTOLIN HFA) 108 (90 Base) MCG/ACT inhaler Inhale 2 puffs into the lungs every 6 hours as needed for shortness of breath, wheezing or cough 18 g 11    allopurinol (ZYLOPRIM) 300 MG tablet Take 1 tablet (300 mg) by mouth as needed.      benzonatate (TESSALON) 100 MG capsule Take 1 capsule (100 mg) by mouth 3 times daily as needed for cough. 90 capsule 1    Cyanocobalamin (VITAMIN B-12) 5000 MCG SUBL Place 2-3 sprays under the tongue every morning. Unknown dose. 2 or 3 sprays/day      Fluticasone-Umeclidin-Vilanterol (TRELEGY ELLIPTA) 200-62.5-25 MCG/ACT oral inhaler Inhale 1 puff into the lungs daily. (Patient taking differently: Inhale 1 puff into the lungs every morning.) 60 each 11    gabapentin (NEURONTIN) 100 MG capsule Take 3 capsules (300 mg) by mouth at bedtime. May also take 3 capsules (300 mg) every 6 hours as needed (anxiety). 150 capsule 1    gabapentin (NEURONTIN) 400 MG capsule Take 1 capsule (400 mg) by mouth at bedtime. Take at bedtime along w/ three 100mg tablets for total of 700mg nightly 30 capsule 1    guaiFENesin (MUCINEX) 600 MG 12 hr tablet Take 600 mg by mouth every morning.      ketoconazole (NIZORAL) 2 % external shampoo Apply topically daily as needed.      KLOR-CON M20 20 MEQ CR tablet Take 1 tablet (20 mEq) by mouth every morning. 90 tablet 3    MAGNESIUM PO Take 2 capsules by mouth 2  times daily. Strength unknown      medical cannabis (Patient's own supply) See Admin Instructions (The purpose of this order is to document that the patient reports taking medical cannabis.  This is not a prescription, and is not used to certify that the patient has a qualifying medical condition.)  Flower      naloxone (NARCAN) 4 MG/0.1ML nasal spray Spray 1 spray (4 mg) into one nostril alternating nostrils as needed for opioid reversal every 2-3 minutes until assistance arrives 0.2 mL 0    omeprazole (PRILOSEC) 20 MG DR capsule Take 1 capsule (20 mg) by mouth as needed.      oxyCODONE (ROXICODONE) 5 MG tablet Take 1 tablet (5 mg) by mouth 5 times daily. May dispense/start 11/29/24 70 tablet 0    rOPINIRole (REQUIP) 2 MG tablet Take 1 tablet (2 mg) by mouth 3 times daily. One tab 3 pm, one bedtime, and one during night on waking 270 tablet 3    STATIN NOT PRESCRIBED (INTENTIONAL) Please choose reason not prescribed from choices below.      SYNTHROID 150 MCG tablet Take 1 tablet (150 mcg) by mouth every morning. MON to SAT 1 tablet/day; SUN 0.5 tablet - confirmed dosing with patient 11/22      traZODone (DESYREL) 50 MG tablet Take 0.5-1 tablets (25-50 mg) by mouth nightly as needed for sleep. 30 tablet 0    UNABLE TO FIND Take 1 tablet by mouth every morning. MEDICATION NAME: CETYL-MYRISFOLEATE      vilazodone (VIIBRYD) 10 MG TABS tablet Take 1 tablet (10 mg) by mouth daily.      vitamin C (ASCORBIC ACID) 1000 MG TABS Take 1,000 mg by mouth every morning.      vitamin D3 (CHOLECALCIFEROL) 250 mcg (68116 units) capsule Take 1 capsule by mouth once a week. SUNDAY'S       No current facility-administered medications for this visit.    Allergies   Allergen Reactions    Serotonin Reuptake Inhibitors (Ssris) Anxiety, Difficulty breathing, Headache, Palpitations and Shortness Of Breath    Buspirone      The patient states she had serotonin syndrome    Cephalexin      Other reaction(s): unknown rxn.    Desvenlafaxine       "Serotonin syndrome    Diclofenac Sodium [Diclofenac]      Serotonin syndrome and restless legs syndrome    Gabapentin      Drove on the wrong side of the highway    Levofloxacin      \"CAN'T REMEMBER\"    Penicillins      \"SORES IN MOUTH\"    Riluzole Difficulty breathing and Swelling    Sulfa Antibiotics      \"PT DOES NOT KNOW WHAT THE REACTION WAS\"    Topiramate Other (See Comments)     Frequent urination         Lab Results (Personally Reviewed)    Chemistry/lipid CBC Cardiac Enzymes/BNP/TSH/INR   Lab Results   Component Value Date    BUN 12.0 11/23/2024     11/23/2024    CO2 25 11/23/2024     Creatinine   Date Value Ref Range Status   11/23/2024 0.64 0.51 - 0.95 mg/dL Final   02/05/2021 0.64 0.52 - 1.04 mg/dL Final       Lab Results   Component Value Date    CHOL 205 (H) 08/06/2024    HDL 63 08/06/2024     (H) 08/06/2024      Lab Results   Component Value Date    WBC 4.4 11/23/2024    HGB 14.9 11/23/2024    HCT 43.2 11/23/2024    MCV 95 11/23/2024     11/23/2024    Lab Results   Component Value Date    TROPONINI 0.01 06/24/2019    BNP 14 06/24/2019    TSH 0.40 11/22/2024    INR 0.99 11/23/2024        The patient states understanding and is agreeable with the plan.     Latonia Saini PA-C  [facility]  Electrophysiology Consult Service  Pager: [phone]    I spent a total of 25 minutes face to face with Randi Cleary during today's office visit. I have spent an additional 20 minutes today on chart review and documentation.                   "

## 2024-12-05 NOTE — PATIENT INSTRUCTIONS
Medication Plan  -Increase Viibryd to 20mg daily  -Continue gabapentin 300-600mg at bedtime as needed + 100-300mg once daily as needed (Max daily dose 900mg - don't take at the same time as oxycodone, and single max dose is 600mg)   -Continue melatonin 6mg nightly  -Increase trazodone to 50-100mg at bedtime PRN     **For crisis resources, please see the information at the end of this document**   Patient Education    Thank you for coming to the [facility] [place] [facility].     Lab Testing:  If you had lab testing today and your results are reassuring or normal they will be mailed to you or sent through [facility] within 7 days. If the lab tests need quick action we will call you with the results. The phone number we will call with results is # [phone]. If this is not the best number please call our clinic and change the number.     Medication Refills:  If you need any refills please call your pharmacy and they will contact us. Our fax number for refills is [phone].   Three business days of notice are needed for general medication refill requests.   Five business days of notice are needed for controlled substance refill requests.   If you need to change to a different pharmacy, please contact the new pharmacy directly. The new pharmacy will help you get your medications transferred.     Contact Us:  Please call [phone] during business hours (8-5:00 M-F).   If you have medication related questions after clinic hours, or on the weekend, please call [phone].     Financial Assistance [phone]   Medical Records [phone]       MENTAL HEALTH CRISIS RESOURCES:  For a emergency help, please call 911 or go to the nearest Emergency Department.     Emergency Walk-In Options:   [facility] Unit @ [facility] ([place]): [phone] - Specialized mental health emergency area designed to be calming  [facility] ([place]): [phone]  [facility]  [facility]iat[facility] ([place]): [phone]  [facility] ([place]): [phone]    [facility] Crisis Information:   [place]: [phone]  [place]: [phone]  [place] ([place]) - Adult: [phone]     Child: [phone]  [place] - Adult: [phone]     Child: [phone]  [place]: [phone]  List of all [facility] resources:   https://[place].[facility]/dhs/people-we-serve/adults/health-care/mental-health/resources/crisis-contacts.jsp    National Crisis Information:   Crisis Text Line: Text  [place]  to 363087  Suicide & Crisis Lifeline: 988  National Suicide Prevention Lifeline: 8-562-622-TALK (1-278.506.6510)       For online chat options, visit https://suicidepreventionlifeline.org/chat/  Poison Control Center: 1-744.641.4556  Trans Lifeline: 1-211.849.8683 - Hotline for transgender people of all ages  [employer]: 4-289-808-3225 - Hotline for LGBT youth     For Non-Emergency Support:   Fast Tracker: Mental Health & Substance Use Disorder Resources -   https://www.[facility].org/

## 2024-12-10 ENCOUNTER — OFFICE VISIT (OUTPATIENT)
Dept: PSYCHIATRY | Facility: CLINIC | Age: 69
End: 2024-12-10
Payer: MEDICARE

## 2024-12-10 DIAGNOSIS — F41.1 GENERALIZED ANXIETY DISORDER: ICD-10-CM

## 2024-12-10 DIAGNOSIS — F33.1 MODERATE EPISODE OF RECURRENT MAJOR DEPRESSIVE DISORDER (H): Primary | ICD-10-CM

## 2024-12-10 ASSESSMENT — PATIENT HEALTH QUESTIONNAIRE - PHQ9

## 2024-12-11 ENCOUNTER — VIRTUAL VISIT (OUTPATIENT)
Dept: PSYCHOLOGY | Facility: CLINIC | Age: 69
End: 2024-12-11
Payer: MEDICARE

## 2024-12-11 DIAGNOSIS — F33.2 MAJOR DEPRESSIVE DISORDER, RECURRENT SEVERE WITHOUT PSYCHOTIC FEATURES (H): ICD-10-CM

## 2024-12-11 DIAGNOSIS — F41.1 GENERALIZED ANXIETY DISORDER: Primary | ICD-10-CM

## 2024-12-11 PROCEDURE — 90837 PSYTX W PT 60 MINUTES: CPT | Mod: 95 | Performed by: MARRIAGE & FAMILY THERAPIST

## 2024-12-11 NOTE — PROGRESS NOTES
[facility]                                     Progress Note    Patient Name: Randi Cleary  Date: 2024       Service Type: Individual      Session Start Time: 9:30  Session End Time: 10:27     Session Length: 53+    Session #: 11    Attendees: Client    Service Modality:  Video Visit:      Provider verified identity through the following two step process.  Patient provided:  Patient  and Patient address    Telemedicine Visit: The patient's condition can be safely assessed and treated via synchronous audio and visual telemedicine encounter.      Reason for Telemedicine Visit: Patient has requested telehealth visit    Originating Site (Patient Location): Patient's home    Distant Site (Provider Location): Provider Remote Setting- Home Office    Consent:  The patient/guardian has verbally consented to: the potential risks and benefits of telemedicine (video visit) versus in person care; bill my insurance or make self-payment for services provided; and responsibility for payment of non-covered services.     Patient would like the video invitation sent by:  My Chart    Mode of Communication:  Video Conference via Amwell    Distant Location (Provider):  Off-site    As the provider I attest to compliance with applicable laws and regulations related to telemedicine.    DATA  Interactive Complexity: No  Crisis: No        Progress Since Last Session (Related to Symptoms / Goals / Homework):   Symptoms: No change      Homework:  n/a      Episode of Care Goals: Satisfactory progress - ACTION (Actively working towards change); Intervened by reinforcing change plan / affirming steps taken     Current / Ongoing Stressors and Concerns:   Struggling to manage symptoms and obligations.  Feels lack of connection with and indifference from .       Treatment Objective(s) Addressed in This Session:   Distress tolerance     Intervention:   Relationship building.  Validation, encouragement.   Reflecting on emotional experiences, generational cycles.      Assessments completed prior to visit:  The following assessments were completed by patient for this visit:  PHQ9:       11/4/2024     2:46 PM 11/6/2024    12:50 PM 11/14/2024    11:26 AM 11/21/2024     8:31 AM 12/3/2024     9:10 AM 12/5/2024     2:43 PM 12/10/2024     9:24 AM   PHQ-9 SCORE   PHQ-9 Total Score MyChart 15 (Moderately severe depression) 15 (Moderately severe depression) 14 (Moderate depression) 16 (Moderately severe depression) 17 (Moderately severe depression) 17 (Moderately severe depression) 15 (Moderately severe depression)   PHQ-9 Total Score 15  15  14  16  17  17  15        Patient-reported     GAD7:       7/17/2024     9:55 AM 9/9/2024     1:33 PM 9/24/2024     9:54 AM 10/8/2024     6:29 AM 10/22/2024     8:59 AM 11/4/2024     2:38 PM 12/3/2024     9:10 AM   CHAVO-7 SCORE   Total Score 10 (moderate anxiety) 13 (moderate anxiety) 9 (mild anxiety) 9 (mild anxiety) 11 (moderate anxiety) 10 (moderate anxiety) 17 (severe anxiety)   Total Score 10 13 9 9    9 11    11 10  17        Patient-reported    Multiple values from one day are sorted in reverse-chronological order     CAGE-AID:       6/22/2020     7:12 PM 1/18/2022    11:15 PM 6/6/2024     8:00 AM   CAGE-AID Total Score   Total Score 4 4 4   Total Score MyChart 4 (A total score of 2 or greater is considered clinically significant) 4 (A total score of 2 or greater is considered clinically significant)      PROMIS 10-Global Health (all questions and answers displayed):       10/17/2024    12:54 PM 10/24/2024     1:00 PM 10/30/2024     8:48 AM 11/6/2024    12:54 PM 11/14/2024    11:35 AM 11/26/2024     5:00 PM 12/4/2024     9:28 AM   PROMIS 10   In general, would you say your health is:   Fair Fair Fair  Good   In general, would you say your quality of life is:   Poor Poor Poor  Poor   In general, how would you rate your physical health?   Poor Good Fair  Good   In general, how would  you rate your mental health, including your mood and your ability to think?   Fair Fair Fair  Fair   In general, how would you rate your satisfaction with your social activities and relationships?   Fair Poor Poor  Poor   In general, please rate how well you carry out your usual social activities and roles   Poor Poor Fair  Poor   To what extent are you able to carry out your everyday physical activities such as walking, climbing stairs, carrying groceries, or moving a chair?   Moderately Moderately Moderately  Moderately   In the past 7 days, how often have you been bothered by emotional problems such as feeling anxious, depressed, or irritable?   Often Often Often  Often   In the past 7 days, how would you rate your fatigue on average?   Moderate Moderate Moderate  Moderate   In the past 7 days, how would you rate your pain on average, where 0 means no pain, and 10 means worst imaginable pain?   7 7 7  8   In general, would you say your health is:   2  2  2   3    In general, would you say your quality of life is:   1  1  1   1    In general, how would you rate your physical health?   1  3  2   3    In general, how would you rate your mental health, including your mood and your ability to think?   2  2  2   2    In general, how would you rate your satisfaction with your social activities and relationships?   2  1  1   1    In general, please rate how well you carry out your usual social activities and roles. (This includes activities at home, at work and in your community, and responsibilities as a parent, child, spouse, employee, friend, etc.)   1  1  2   1    To what extent are you able to carry out your everyday physical activities such as walking, climbing stairs, carrying groceries, or moving a chair?   3  3  3   3    In the past 7 days, how often have you been bothered by emotional problems such as feeling anxious, depressed, or irritable?   4  4  4   4    In the past 7 days, how would you rate your fatigue  on average?   3  3  3   3    In the past 7 days, how would you rate your pain on average, where 0 means no pain, and 10 means worst imaginable pain?   7  7  7   8    Global Mental Health Score   7  6  6   6    Global Physical Health Score   9  11  10   11    PROMIS TOTAL - SUBSCORES   16  17  16   17        Information is confidential and restricted. Go to Review Flowsheets to unlock data.    Patient-reported     Ritchie Suicide Severity Rating Scale (Lifetime/Recent)      6/11/2020    10:00 AM 1/9/2023     1:00 PM 5/21/2024     4:49 PM 5/21/2024     9:00 PM 6/6/2024     8:00 AM 7/10/2024    12:00 PM 11/22/2024    11:05 AM   Ritchie Suicide Severity Rating (Lifetime/Recent)   Q1 Wish to be Dead (Lifetime) Yes   Yes      Comments when patient was in treatment and there were family concerns   OD on medications      Q2 Non-Specific Active Suicidal Thoughts (Lifetime) Yes   No      Most Severe Ideation Rating (Lifetime) 5   5      Most Severe Ideation Description (Lifetime) when family problems were happening   OD on medication      Frequency (Lifetime) --   3      Duration (Lifetime) --   3      Controllability (Lifetime) 0   2      Protective Factors  (Lifetime) 5   4      Reasons for Ideation (Lifetime) --   5      Q1 Wished to be Dead (Past Month)  yes 1-->yes 1-->yes 1-->yes  0-->no   Q2 Suicidal Thoughts (Past Month)  no 1-->yes 1-->yes 1-->yes  0-->no   Q3 Suicidal Thought Method  no 0-->no 0-->no 1-->yes     Q4 Suicidal Intent without Specific Plan  no 1-->yes 0-->no 0-->no     Q5 Suicide Intent with Specific Plan  no 0-->no 0-->no 1-->yes     Q6 Suicide Behavior (Lifetime)  yes 1-->yes 0-->no 1-->yes  0-->no   If yes to Q6, within past 3 months?  no 1-->yes 0-->no 0-->no     Level of Risk per Screen  moderate risk high risk moderate risk high risk  no risks indicated   RETIRED: 1. Wish to be Dead (Recent) No         RETIRED: 2. Non-Specific Active Suicidal Thoughts (Recent) No         3. Active Suicidal  Ideation with any Methods (Not Plan) Without Intent to Act (Lifetime) Yes         RETIRE: Active Suicidal Ideation with any Methods (Not Plan) Description (Lifetime) 30 years prior         RETIRE: 4. Active Suicidal Ideation with Some Intent to Act, Without Specific Plan (Lifetime) Yes         RETIRE: 5. Active Suicidal Ideation with Specific Plan and Intent (Lifetime) Yes         Actual Attempt (Lifetime) Yes         Actual Attempt Description (Lifetime) patient reported overdosing on Prozac about thirty years ago         Total Number of Actual Attempts (Lifetime) 1         Actual Attempt (Past 3 Months) No         Has subject engaged in non-suicidal self-injurious behavior? (Lifetime) Yes         Has subject engaged in non-suicidal self-injurious behavior? (Past 3 Months) No         Interrupted Attempts (Lifetime) No         Interrupted Attempts (Past 3 Months) No         Aborted or Self-Interrupted Attempt (Lifetime) No         Aborted or Self-Interrupted Attempt (Past 3 Months) No         Preparatory Acts or Behavior (Lifetime) No         Preparatory Acts or Behavior (Past 3 Months) No         Most Recent Attempt Date --         Comments 30 years prior         Most Recent Attempt Actual Lethality Code 0         Q1 Wish to be Dead (Lifetime)      N    Q2 Non-Specific Active Suicidal Thoughts (Lifetime)      N          ASSESSMENT: Current Emotional / Mental Status (status of significant symptoms):   Risk status (Self / Other harm or suicidal ideation)   Patient denies current fears or concerns for personal safety.   Patient reports the following current or recent suicidal ideation or behaviors: passive ideation of wanting to escape the struggles, no plan, no intent.   Patient denies current or recent homicidal ideation or behaviors.   Patient denies current or recent self injurious behavior or ideation.   Patient denies other safety concerns.   Patient reports there has been no change in risk factors since their  last session.     Patient reports there has been no change in protective factors since their last session.     A safety and risk management plan has been developed including: Patient consented to co-developed safety plan on 6/4/24.  Safety and risk management plan was reviewed.   Patient agreed to use safety plan should any safety concerns arise.  A copy was made available to the patient.     Appearance:   Appropriate    Eye Contact:   Good    Psychomotor Behavior: Normal    Attitude:   Cooperative  Pleasant   Orientation:   All   Speech    Rate / Production: Slow     Volume:  Normal    Mood:    Normal   Affect:    Appropriate  Subdued    Thought Content:  Clear    Thought Form:  Coherent  Logical    Insight:    Good      Medication Review:   No changes to current psychiatric medication(s)     Medication Compliance:   Yes     Changes in Health Issues:   None reported     Chemical Use Review:   Substance Use: Chemical use reviewed, no active concerns identified      Tobacco Use: No current tobacco use.      Diagnosis:  1. Generalized anxiety disorder    2. Major depressive disorder, recurrent severe without psychotic features (H)      Collateral Reports Completed:   Not Applicable    PLAN: (Patient Tasks / Therapist Tasks / Other)  Focus on self-care, distress tolerance.  Look into elder waiver next appointment.  Consider her tone and delivery while keeping mindset of being on same team as .      Erika Colorado, [facility]                                                         ______________________________________________________________________    Individual Treatment Plan    Patient's Name: Randi Cleary  YOB: 1955    Date of Creation: 7/17/2024  Date Treatment Plan Last Reviewed/Revised: 7/17/2024, 10/30/24     DSM5 Diagnoses:   296.33 (F33.2) Major Depressive Disorder, Recurrent Episode, Severe   300.02 (F41.1) Generalized Anxiety Disorder  Psychosocial / Contextual Factors: history of  trauma  PROMIS (reviewed every 90 days):     Referral / Collaboration:  Discussed and patient will pursue a therapy group for depressive symptoms.    Anticipated number of session for this episode of care: 9-12 sessions  Anticipation frequency of session: Weekly  Anticipated Duration of each session: 38-52 minutes  Treatment plan will be reviewed in 90 days or when goals have been changed.       MeasurableTreatment Goal(s) related to diagnosis / functional impairment(s)  Goal 1: Client will keep self safe and eliminate suicidal ideation and self-harm behaviors.    Objective #A (Client Action)    Client will make a list of at least 10 skills or activities that you will to use to distract from urges to harm self.  Status: Completed - Date: 10/30/24       Intervention(s)  Therapist will provide ideas and strategies for generating coping skills list.    Objective #B  Client will make a list of pros and cons for tolerating and not tolerating an urge to harm self.  Status: Continued - Date(s):10/30/24      Intervention(s)  Therapist will guide client through DBT-style Pros/Cons list for behavior change.    Objective #C  Client will identify and practice the new strategies for dealing with strong emotions, learn and practice relaxation breathing.  Status: Continued - Date(s):10/30/24      Intervention(s)  Therapist will teach distraction skills. Practice them within session and outside of session.    Goal 2: Client will report reduction in anxiety also as evidenced by reduction of CHAVO-7 score below 6 points within the next 12 weeks.    Objective #A (Client Action)    Client will identify at least three triggers for anxiety.  Status: Completed - Date: 10/30/24       Intervention(s)  Therapist will provide educational materials on common triggers and signs of anxiety.    Objective #B  Client will use relaxation strategies at least two times per day to reduce the physical symptoms of anxiety.  Status: Continued -  Date(s):10/30/24      Intervention(s)  Therapist will teach relaxation strategies such as mindfulness, deep breathing, muscle relaxation, and sensory activities.    Objective #C  Client will use cognitive strategies identified in therapy to challenge anxious thoughts.  Status: Continued - Date(s):10/30/24      Intervention(s)  Therapist will teach strategies for cognitive modification using REBT model.    Goal 3: Client will report improved mood as indicated by PHQ-9 score below 6 for consistent 8 weeks.    Objective #A (Client Action)    Status: Continued - Date: 10/30/24     Client will Increase interest, engagement, and pleasure in doing things.    Intervention(s)  Therapist will assign homework for daily involvement in pleasant activities.    Objective #B  Client will Identify negative self-talk and behaviors: challenge core beliefs, myths, and actions.    Status: Continued - Date(s):10/30/24      Intervention(s)  Therapist will teach strategies for cognitive modification using REBT models.    Objective #C  Client will Improve quantity and quality of night time sleep / decrease daytime naps.  Status: Continued - Date(s):10/30/24      Intervention(s)  Therapist will teach sleep hygiene strategies.  Assign homework for daily practice.    Goal 4: Client will report reduced or eliminated physiological activation when recalling a distressing event including decreased re-experiencing, decreased avoidance, and decreased hyperarousal.    Objective #A (Client Action)    Status:  Continued: 10/30/24       Client will acquire knowledge of trauma, common symptoms post-trauma, emotion regulation strategies, stress management strategies, and cognitive coping strategies.    Intervention(s)  Therapist will teach about effects of trauma on mind and body; encourage emotion identification and expression; practice with client the stress management strategies; and teach cognitive modification.    Objective #B  Client will use  Brainspotting while focusing on physiological sensations related to distressing events.  Client will assess and rate level of physiological activation.  Status: Continued - Date(s):10/30/24     Intervention(s)  Therapist will provide use a pointer or other materials to assist client in holding a brainspot in their visual field.  Therapist might also provide biolateral music through headphones to deepen the experience.         Patient has reviewed and agreed to the above plan.      Eirka Colorado, LMFT  July 17, 2024

## 2024-12-13 ENCOUNTER — HOSPITAL ENCOUNTER (OUTPATIENT)
Dept: BEHAVIORAL HEALTH | Facility: CLINIC | Age: 69
Discharge: HOME | End: 2024-12-13
Attending: STUDENT IN AN ORGANIZED HEALTH CARE EDUCATION/TRAINING PROGRAM
Payer: MEDICARE

## 2024-12-13 VITALS
TEMPERATURE: 98 F | DIASTOLIC BLOOD PRESSURE: 70 MMHG | RESPIRATION RATE: 18 BRPM | OXYGEN SATURATION: 95 % | HEART RATE: 75 BPM | SYSTOLIC BLOOD PRESSURE: 112 MMHG

## 2024-12-13 DIAGNOSIS — F33.2 SEVERE RECURRENT MAJOR DEPRESSION WITHOUT PSYCHOTIC FEATURES (H): Primary | ICD-10-CM

## 2024-12-13 PROCEDURE — 370N000017 HC ANESTHESIA TECHNICAL FEE, PER MIN: Performed by: STUDENT IN AN ORGANIZED HEALTH CARE EDUCATION/TRAINING PROGRAM

## 2024-12-13 PROCEDURE — 250N000011 HC RX IP 250 OP 636: Performed by: PSYCHIATRY & NEUROLOGY

## 2024-12-13 PROCEDURE — 90870 ELECTROCONVULSIVE THERAPY: CPT | Performed by: STUDENT IN AN ORGANIZED HEALTH CARE EDUCATION/TRAINING PROGRAM

## 2024-12-13 PROCEDURE — 90870 ELECTROCONVULSIVE THERAPY: CPT

## 2024-12-13 RX ORDER — KETOROLAC TROMETHAMINE 30 MG/ML
30 INJECTION, SOLUTION INTRAMUSCULAR; INTRAVENOUS ONCE
Status: COMPLETED | OUTPATIENT
Start: 2024-12-13 | End: 2024-12-13

## 2024-12-13 RX ORDER — KETOROLAC TROMETHAMINE 30 MG/ML
30 INJECTION, SOLUTION INTRAMUSCULAR; INTRAVENOUS ONCE
Start: 2024-12-13 | End: 2024-12-13

## 2024-12-13 RX ADMIN — KETOROLAC TROMETHAMINE 30 MG: 30 INJECTION, SOLUTION INTRAMUSCULAR at 09:56

## 2024-12-13 NOTE — PROGRESS NOTES
Pt has met discharge criteria. VSS. PIV removed without any issue.  Pt moved to discharge area and brought to car to discharge home.

## 2024-12-13 NOTE — PROCEDURES
"Procedures  [facility], [place]   ECT Procedure Note   12/13/2024    Randi Cleary is a 68 year old female patient.  [id number]    Patient Status: outpatient    Is this the first in a series of 12 treatments?  No      Allergies   Allergen Reactions    Serotonin Reuptake Inhibitors (Ssris) Anxiety, Difficulty breathing, Headache, Palpitations and Shortness Of Breath    Buspirone      The patient states she had serotonin syndrome    Cephalexin      Other reaction(s): unknown rxn.    Desvenlafaxine      Serotonin syndrome    Diclofenac Sodium [Diclofenac]      Serotonin syndrome and restless legs syndrome    Gabapentin      Drove on the wrong side of the highway    Levofloxacin      \"CAN'T REMEMBER\"    Penicillins      \"SORES IN MOUTH\"    Riluzole Difficulty breathing and Swelling    Sulfa Antibiotics      \"PT DOES NOT KNOW WHAT THE REACTION WAS\"    Topiramate Other (See Comments)     Frequent urination       Weight:  0 lbs 0 oz / 0 kg          Indications for ECT:   Medications ineffective and Psychotherapies ineffective         Clinical Narrative:   HPI - The patient describes a lifelong history of depression dating back to elementary school, worsening after her father's death when she was 15yo and especially in the 1980s in the setting of worsening physical health (since falling and breaking her ankle), which led to the the initiation of fluoxetine, her first antidepressant.  Her history has been primarily characterized by low mood, with some ups and downs but no extended depression-free periods since then.  She has tried many different medications from different classes, but cannot recall any one being particularly helpful for her.  She has experienced past episodes of particularly irritable mood and concomitant decreased sleep need, although most of these have lasted 1-2 days and triggered by anger related to external stressors.  She has at least one episode of a more extended " "episode of elevated mood (and associated grandiosity, increased spending, flight of ideas, increased goal directed behaviors, pressured speech, and odd and embarrassing behavior), which occurred in the context of relapse on alcohol in 2021. She does not endorse any events before about age 48.     The patient's depression is characterized by near daily low mood, frequent anhedonia, with sleep difficulties (although c/b pain, KYAW, and RLS), fatigue, feelings of guilt/worthlessness, and impaired concentration.  She reports feelings of \"despair,\" at times with active SI with plan, but denies any intent to act on this - \"maybe it's hope.\"  She has 1 lifetime suicide attempt (overdose) in the 1980s, for which she was psychiatrically hospitalized.  She has a remote history of SIB (cutting) but not recently.       Psych pertinent item history includes includes suicide attempt , suicidal ideation, SIB , aggression, trauma hx, substance use: alcohol, cannabis, and Patient has a history of alcohol dependence treated 20+ years ago and she relapsed in 2020 for a couple of months, in her 20s she\" loved getting high\" on cocaine, LSD, mushrooms, speed, white cross, mutiple psychotropic trials , psych hosp, ketamine, and major medical problems.    Target Symptoms for Improvement: Amotivation, Fatigue, Improved self-image and Panic attacks         Diagnosis:   Major depression         Assessment:   #1 05/24/24 Some circumstantial thought, needs some redirection, 3.5 hours sleep last night, anxious, mood 1/10, PDW but no SI, never had ECT before - only TMS. No AVH.   #2 05/29/24  Mood 4/10, better over w/e, some word find difficulties, no AVH/SI/PDW. Chronic sciatica pain, neck and shoulder pain - didn't worsen with ECT. Mild headache.   #3 05/31/24  Mood 4/10, No SI, PDW, AVH, some wrist pain and headache, memory might be improving.    #4 6/3/24  Phq-9= 13, mood 3/10.  Yesterday was very tearful, still a bit today.  Last treatment " "had awareness under paralysis.  Otherwise, some initial confusion after first ECT but no subsequent cognitive effects.  Signed consent to continue.  #5 6/5/24 Mood 4-5/10  She feels like her \"rage\" is less and she feels lighter. but no cognitive side effects. She complains of excessive sweating and is concerned her thryoid is unbalanced. She is somatically focused She reports pain on the side of her neck radiating down to her chest. She had a migraine she thinks over the weekend which usual starts as occipital tension.  #6 6/7/24 mood 4-5/10. No SI. She does have some baseline long term memory difficulties no AVH.   #7 6/10/24  She still is worried that She is not able to go home. She had visitors this weekend who said \"you don't seem to have the weight of the world on your shoulders anymore\" she disagrees she had a downward spiral over the weekend when there was a mix up with her clothes and she usually feels tearful after ECT. She does not report anything feeling worse. She gets down on herself when she has an anxiety spiral and it was the 1st one she has had since admission.   #8 6/12/24 Winnie reported some tearfulness overnight. She is noticing a weight lifting mood 6/10 . Reports some difficulty with remembering names but believes this is at baseline.   #9 6/14/24 Mood 5/10 (10 best) She feels like she is improving each time . She still has occasional crying spells but she feels she bounces back quicker. She is still anxious about going home. No Si. Tolerating ECT  #10 06/17/24 mood 5/10 She does feellike she has gotten some improvement since starting Ect but still has times where she gets tearful and anxious \"goes down the rabit hole\" but not as often . She has had ketamine troches before with pain  management to no effect but wonders about ketamine infusions.  #11 06/19/24 Mood today is 5/10. Patient is wondering whether she could do a ketamine course (did have ketamine in the past), despite of being on " "opioids. Feels that ketamine can help with \"anger, tearing and anxiety.\" She complains to the anesthesiologist about recent urinary incontinence which needs to be considered when  using ketamine. She wants to try it for anger ,tearing and anxiety which is not a standard indication  #12 06/21/24 Mood 5/10, no PDW/SI, but some anxiety around pain this AM.  Patient is interested in maintenance ECT at her attending psychiatrist's recommendation to prevent the possibility of her mood dipping as low as it did previously that led to her hospitalization.  Discussed pros and cons of maintenance ECT, suggested alternative of maintenance medications/therapy and/or watchful waiting with possibility of retreatment should she relapse, as well as alternate interventions including ketamine.  At the moment, she is most interested in pursuing maintenance ECT - will plan for next Friday pending confirmation with her family that she has post-ECT ride and monitoring.  M1 06/28/24 Mood ups and downs, irritability, anxiety, sadness switching multiple times a day since being discharged home.  Had difficulty with the transition home, was harder than she thought it would be.  However, still able to \"push away\" PDW/SI, and did not have periods of persistently low mood this past week.  Has noticed some anterograde and retrograde amnesia of the 1-2 months prior to starting ECT.  Will continue to track.  Today, mood 6/10 \"now that I'm at ECT.\"  M2 07/05/24 She was tearful, states that she doesn't feel good, \"starting to drop\", states that the mood change happened in Wednesday somewhat suddenly, when she encountered several business stressors and felt overwhelmed. Mood 3-4/10. Denies SI and feels safe at home. Still reports memory issues. Reports that the day program has been overwhelming.    She cancelled her colonoscopy in order to focus on her right heart cath the next week. She feels like she has too many procedures scheduled and pushed off " her sleep study also.  M3 7/12/24 Doing well.  Somewhat stressed witht all the medical appointments she has to coordinate. Her colonoscopy got cancelled because of her upcoming appointment with cardiology. Canceled the outpatient program but interested in doing the group therapy at [facility].   M4 7/19/24 Doing well. Less frustrated and started therapy. Reports short term memory impairment. That said, she is hesitant to space ECT to l2bbder  M5 8/2/24 Mood 5-6/10 she struggles some with the interval felling more irritable and tearful the second week. She felt some Si yesterday because she was frustrated and overehwlemd but no SI today. Cogntiively processing speed is affected and does better if she can get a hint. Encouraged her to take her viibryd as prescribed  M6 08/16/24  She is having headaches she attributes to the viibryd and encouraged her to adhere. She reports she still has lability between session lots of stressors with medical billing errors and feeling like she is hounded by collections mood 4/10. Tolerating Ect without complaints of cognitive side effects. Spent birthday in still water   M7 09/04/24  called earlier today with plan to cancel because she was feeling overwhelmed and had poor sleep the night before. Encouraged her to attend . She was very stressed because her electricity was out for mo than a week and her internet is out now . She still gets many incorrect medical bills which are very stressful  some trouble with naming songs on the radio  M8 09/13/24 Winnie is focused on her upcoming shoulder surgery and wants to make sure we talk about ECT and her recovery period. Will meet with surgeon in October and plan surgery in november. Mood is struggling because insomnia is still a problem despite low dose of trazodone    M9 09/27/24  she discontinued her viibryd as she attributes insomnia to that medication. Encourages her to start Auvelity which is nher new foundational antidepressant. She had a  "successful cath lab visit and went out to celebrate and ended up having a fall and hit her head and was worked-up in the ED.  She reports she was tearful yesterdya she is still having mood dips in between sessions so not comfortable spacing out until she is re-established on antidepressant  M10 10/11/24: Mood is doing relatively well but has had some difficulties recovering after the fall, difficulty breathing attributed to bruised rib.  Now has rash at site of COVID vaccine from Wed, warm and red c/f cellulitis.  Trying to start Auvelity in parts due to lack of coverage - hasn't happened yet.  Will continue [id number] maintenance while stabilizing meds and awaiting ortho surgery.  Mood: 7/10 (10=best), PHQ-9: 9    M11 10/25/24: Patient is feeling overwhelmed by medical appointments and commitments, has had worsening irritability related to this.  Recognizes that her mood is still overall better, but these acute stressors lead to an up-and-down over the course of the week, and there have been more downs this week.  Started faux-velity, some headaches but hoping these will resolve.  She is concerned that she won't be able to make it 6 weeks after her ortho surgery without ECT, but will continue to discuss.  Denies PDW/SI - \"I've done a reassessment\" and recognizes these stressors make things hard but \"I used to love life.\"  Denies adverse effects other than insomnia night before treatment due to holding Neurontin - will ask about alternate sleep options.   M12 Mood 6-7/10 going to group. Got good news from right heart cath. She has word finding difficulty. Wants to take up Mahjong. Got out into her yard for the first time in a long while. No falls. She doesn't like abilify thinks it is causing tinnitus and jaw clenching but will try to keep for now. PHQ-9=11 QIDS=18  M13 11/22/24: Mood has been \"not great\" the last two weeks, in setting of poor sleep, acute psychosocial stressors, medications changes, and cutting down " "on cannabis.  Wants to talk to primary psychiatrist about better control for sleep.  Does still feel that clarity of thought and motivation remain high overall, ECT still helping.  Will keep q2week ECT for now, discussed trial at 3    Complicated by: new onset Afib, regular rate -> referred to ED  M14 12/13/24: Delayed scheduled visit last week due to need for Cardiac clearance following Afib from last treatment.  Saw cardiology, who cleared patient to continue without anticoagulation due to CHADSVASC = 2.  Today, mood started to drift about a week ago, most notably irritability, but not the worst it's been.  Denies PDW/SI.  She is anticipating her surgery 1/8/24, and would like to do a treatment in 2 weeks \"as usual\" and then ideally on 1/6/24 right before the surgery.  We will book next treatment in 2 weeks, but patient to call if she's doing well and okay to space additional week.  Mood: 6/10 (10=best), PHQ-9: 15           Pause for the Cause:     Correct patient Yes   Correct procedure/laterality settings: Yes           Intra-Procedure Documentation:     ECT #: 26   Treatment number this series: M14   Total treatment number: 26     Type of ECT:  Right, unilateral ultrabrief    ECT Medications:    Toradol 30mg iv - for headache/myalgia     Brevital: 100 mg (incr from 90 mg as still awake)   Succinyl Choline: 90 mg    BP - per anesthesia team     ECT Strip Summary: RUL Titration #3: 38.4 mC   Energy Level:  384.0mC, 0.3 ms, 100 Hz, 8 sec, 800 mA     Motor Seizure Duration: 17 seconds  EEG Seizure Duration: 42 seconds      Time for re-orientation:     Complications:   none      Plan:   - Plan for maintenance in 2 weeks (12/27/24), then we will evaluate need for additional before surgery January 8th, when she will need a 4 week break  - Monitor depression severity with clinical assessment augmented with PHQ9 every other treatment  - Continue current medications    Discharge instructions:    -- Remember to NOT take " gabapentin after 6pm the night before each treatment  -- During maintenance, you can't drive for 24 hours after each treatment.    - We discussed other alternatives including trying ketamine through the [facility] or staying on your regular medications and therapy, but at the moment you were most interested in pursuing maintenance        Boo Riley MD  Department of Psychiatry & Behavioral Science  [facility]  Preferred Contact: Epic Chat / [place]

## 2024-12-13 NOTE — DISCHARGE INSTRUCTIONS
ECT Discharge Instructions      After each ECT treatment:    Get plenty of rest. A responsible adult must stay with your for at least 6 hours.  Avoid heavy or risky activities for 24 hours while in maintenance ECT.   Do not drive for at least 24 hours after your treatment while in maintenance ECT.   If you have more than one treatment within one week, do not drive for 7 days after your last treatment.   If you feel light-headed, sit for a few minutes before standing. Have someone help you get up.  If you have nausea (feel sick to your stomach): Drink only clear liquids such as apple juice, ginger ale, broth or 7UP, Be sure to drink plenty of liquids. Move to a normal diet as you feel able.   If you received Toradol, wait 6 hours before taking ibuprofen.  Call your doctor if:   You have a fever over 100F (37.7 C) (taken under the tongue), or a fever that last more than 24 hours.  Your IV site is red, swollen, very painful or is getting more tender.  You have nausea that gets very bad or does not improve.    If you have any symptoms after ECT, tell our staff before your next treatment.  The ECT Department can be reached at [phone].  The ECT Department is open Mondays, Wednesdays and Fridays from 7:00 AM to 2:00 PM.    To speak to a doctor, call:  Your primary care provider or [facility] for Dr. Riley, Dr. Beavers, Dr. Hutchison or Dr. Cotter PHONE: [phone]; fax: [phone]    New instructions:  Plan for maintenance in 2 weeks (12/27/24), then we will evaluate need for additional before surgery January 8th  Please call if you are feeling well and want to space out your appointment further than 12/27.    You can take Ambien on any night that you do not have ECT the next morning or you can take trazodone for sleep issues.     Your next ECT appointment will now be scheduled by a scheduling service. They should call you within 24 hours of your ECT appointment. For any urgent scheduling needs or to  change your appointment, please call the ECT department at [phone] and they will get in touch with scheduling for you.     You have received Toradol today at 09:56 AM. Toradol is an NSAID.  Do not take any NSAID's including: Ibuprofen, Naproxen Sodium, Asprin, Advil, Motrin, Aleve, Shannan, etc., until six hours after the above time which would be 3:56 PM. If you have any questions check back with your Physician, or pharmacist.     Covid-19 Testing:  We are no longer requiring covid tests prior to your procedure. If you are ill, please call the ECT department to discuss plan for rescheduling your appointment. [phone]  Please come to the [facility] and check-in with Security before your ECT appointment.  The [facility] entrance is located right next to the Adult Emergency Room.  The address is [address] [place] [address].    After your appointment, you may be picked up at the [facility] entrance, which is located at [address] [place] [address], about 1 block North of the [facility].    Transported by:   Louie Little    Reviewed AVS discharge instructions with:   Louie Little

## 2024-12-14 NOTE — PROGRESS NOTES
Interventional Psychiatry Program   Treatment Resistant Depression (TRD) Group  [facility], [facility]        Patient: Randi Cleary (1955)    MRN: [id number]  Date of Treatment: November 5, 2024  Duration of Treatment: Start Time: 10:30 am; End Time: 12:00  Providers/Group Facilitators: Arsenio Jiang, PhD, LP; Tiffany Mustafa MA (PhD student)    Number of Participants: 4  Group Format: In Person     People present:   Providers: Arsenio Jiang and Tiffany Mustafa  Patient: Randi Cleary  Others: Other patients    Diagnosis:  Major depressive disorder, severe, recurrent, without psychotic features  Generalized anxiety disorder    Assessment (current symptoms):       12/3/2024     9:10 AM 12/5/2024     2:43 PM 12/10/2024     9:24 AM   PHQ   PHQ-9 Total Score 17  17  15    Q9: Thoughts of better off dead/self-harm past 2 weeks Not at all  Not at all  Not at all        Patient-reported         10/22/2024     8:59 AM 11/4/2024     2:38 PM 12/3/2024     9:10 AM   CHAVO-7 SCORE   Total Score 11 (moderate anxiety) 10 (moderate anxiety) 17 (severe anxiety)   Total Score 11    11 10  17        Patient-reported    Multiple values from one day are sorted in reverse-chronological order     The treatment resistant depression (TRD) group is based on the cognitive behavioral therapy model that uses psychoeducation, behavioral activation, mindfulness, supportive techniques, problem solving techniques, and social skills.       Inclusion criteria for group participation: Current patient in the [facility] TRD program; agreement to group format and guidelines discussed in first session     Session 5 Content:  Mindfulness exercise (mindful drawing) and discussion  Collected the BADS-SF  Check in on between session assignment (activities for pleasure & mastery)  Elective topic: Pleasure Predicting  Assignment: schedule activities & note predictions of activity enjoyment      Description: Winnie was engaged in group and made supportive comments of other members as well as self-disclosure that was appropriate and aided the group learning.    Patient's reaction to treatment strategies: Pt was positively engaged in group and helpful to others. Pt shared about relationship between medical challenges and difficulty keeping up with household obligations, which led group to discuss the value of attempting positive activities even in the midst of physical limitations.     Patient's progress toward treatment goals: Pt expressed commitment to working in group therapy.    Mental Status Exam:  Alertness: alert and oriented  Appearance: well groomed  Behavior/Demeanor: cooperative and pleasant, with good eye contact   Speech: normal  Language: good. Preferred language identified as English.  Psychomotor: normal or unremarkable  Mood: depressed  Affect: restricted; was congruent to mood; was congruent to content  Thought Process/Associations: unremarkable  Perception: Reports intact    Insight: appears appropriate  Judgment: appears appropriate  Cognition: does appear grossly intact      Outpatient Treatment Plan     Today's Date: 11/10/24    Date of 1st group [email or web address]       Diagnosis:  Major depressive disorder, severe, recurrent, without psychotic features  Generalized anxiety disorder    Current symptoms and circumstances that substantiate the diagnosis:  Persistent dysphoria, anhedonia, sleep disturbance, low energy/fatigue, trouble concentrating, and interfering anxiety and irritability.     How symptoms and/or behaviors are affecting level of function:  Reduced social engagement/social isolation; minimal physical activity    Risk Assessment:  Suicide:  Assessed Level of Immediate Risk: Low  Ideation: denied thoughts of suicide or self-harm  Denies self-harm action  Plan:  denies plan for suicide and self-harm  Safety: denied safety concerns     Homicide/Violence:  Assessed Level of  Immediate Risk: Low  Ideation: Denies  Plan: n/a  Means: No  Intent: No          SYMPTOMS; PROBLEMS   MEASURABLE GOALS;    FUNCTIONAL IMPROVEMENT INTERVENTIONS Gains Made:     Depression reduce depressive symptoms, reduce depressive episodes, and report feeling more positive about self   Increase engagement with value-driven activities BA group therpay Pt is seeking to maintain gains     Frequency of Sessions: Weekly    Discharge and Aftercare Goals: see measurable goals above  Expected duration of treatment: 8 weeks  Participants in therapy plan (family, friends, support network): self     Patient verbally consented to the treatment plan above.    Tiffany Mustafa M.A.    Answers submitted by the patient for this visit:  Patient Health Questionnaire (Submitted on 12/10/2024)  If you checked off any problems, how difficult have these problems made it for you to do your work, take care of things at home, or get along with other people?: Very difficult  PHQ9 TOTAL SCORE: 15  Patient Health Questionnaire (G7) (Submitted on 12/3/2024)  CHAVO 7 TOTAL SCORE: 17

## 2024-12-17 ENCOUNTER — OFFICE VISIT (OUTPATIENT)
Dept: PSYCHIATRY | Facility: CLINIC | Age: 69
End: 2024-12-17
Payer: MEDICARE

## 2024-12-17 DIAGNOSIS — F33.1 MODERATE EPISODE OF RECURRENT MAJOR DEPRESSIVE DISORDER (H): Primary | ICD-10-CM

## 2024-12-17 DIAGNOSIS — F41.1 GENERALIZED ANXIETY DISORDER: ICD-10-CM

## 2024-12-17 ASSESSMENT — ANXIETY QUESTIONNAIRES
7. FEELING AFRAID AS IF SOMETHING AWFUL MIGHT HAPPEN: MORE THAN HALF THE DAYS
1. FEELING NERVOUS, ANXIOUS, OR ON EDGE: MORE THAN HALF THE DAYS
8. IF YOU CHECKED OFF ANY PROBLEMS, HOW DIFFICULT HAVE THESE MADE IT FOR YOU TO DO YOUR WORK, TAKE CARE OF THINGS AT HOME, OR GET ALONG WITH OTHER PEOPLE?: VERY DIFFICULT
GAD7 TOTAL SCORE: 13
GAD7 TOTAL SCORE: 13
6. BECOMING EASILY ANNOYED OR IRRITABLE: NEARLY EVERY DAY
5. BEING SO RESTLESS THAT IT IS HARD TO SIT STILL: MORE THAN HALF THE DAYS
2. NOT BEING ABLE TO STOP OR CONTROL WORRYING: SEVERAL DAYS
IF YOU CHECKED OFF ANY PROBLEMS ON THIS QUESTIONNAIRE, HOW DIFFICULT HAVE THESE PROBLEMS MADE IT FOR YOU TO DO YOUR WORK, TAKE CARE OF THINGS AT HOME, OR GET ALONG WITH OTHER PEOPLE: VERY DIFFICULT
3. WORRYING TOO MUCH ABOUT DIFFERENT THINGS: SEVERAL DAYS
GAD7 TOTAL SCORE: 13
4. TROUBLE RELAXING: MORE THAN HALF THE DAYS
7. FEELING AFRAID AS IF SOMETHING AWFUL MIGHT HAPPEN: MORE THAN HALF THE DAYS

## 2024-12-17 ASSESSMENT — PATIENT HEALTH QUESTIONNAIRE - PHQ9
10. IF YOU CHECKED OFF ANY PROBLEMS, HOW DIFFICULT HAVE THESE PROBLEMS MADE IT FOR YOU TO DO YOUR WORK, TAKE CARE OF THINGS AT HOME, OR GET ALONG WITH OTHER PEOPLE: VERY DIFFICULT
SUM OF ALL RESPONSES TO PHQ QUESTIONS 1-9: 15
SUM OF ALL RESPONSES TO PHQ QUESTIONS 1-9: 15
10. IF YOU CHECKED OFF ANY PROBLEMS, HOW DIFFICULT HAVE THESE PROBLEMS MADE IT FOR YOU TO DO YOUR WORK, TAKE CARE OF THINGS AT HOME, OR GET ALONG WITH OTHER PEOPLE: VERY DIFFICULT
SUM OF ALL RESPONSES TO PHQ QUESTIONS 1-9: 15
SUM OF ALL RESPONSES TO PHQ QUESTIONS 1-9: 15

## 2024-12-18 ENCOUNTER — VIRTUAL VISIT (OUTPATIENT)
Dept: PSYCHOLOGY | Facility: CLINIC | Age: 69
End: 2024-12-18
Payer: MEDICARE

## 2024-12-18 ENCOUNTER — MYC MEDICAL ADVICE (OUTPATIENT)
Dept: INTERNAL MEDICINE | Facility: CLINIC | Age: 69
End: 2024-12-18
Payer: MEDICARE

## 2024-12-18 DIAGNOSIS — F41.1 GENERALIZED ANXIETY DISORDER: Primary | ICD-10-CM

## 2024-12-18 DIAGNOSIS — F33.2 MAJOR DEPRESSIVE DISORDER, RECURRENT SEVERE WITHOUT PSYCHOTIC FEATURES (H): ICD-10-CM

## 2024-12-18 PROCEDURE — 90837 PSYTX W PT 60 MINUTES: CPT | Mod: 95 | Performed by: MARRIAGE & FAMILY THERAPIST

## 2024-12-18 NOTE — PROGRESS NOTES
[facility]                                     Progress Note    Patient Name: Randi Cleary  Date: 2024       Service Type: Individual      Session Start Time: 9:30  Session End Time: 10:27     Session Length: 53+    Session #: 12    Attendees: Client    Service Modality:  Video Visit:      Provider verified identity through the following two step process.  Patient provided:  Patient  and Patient address    Telemedicine Visit: The patient's condition can be safely assessed and treated via synchronous audio and visual telemedicine encounter.      Reason for Telemedicine Visit: Patient has requested telehealth visit    Originating Site (Patient Location): Patient's home    Distant Site (Provider Location): Provider Remote Setting- Home Office    Consent:  The patient/guardian has verbally consented to: the potential risks and benefits of telemedicine (video visit) versus in person care; bill my insurance or make self-payment for services provided; and responsibility for payment of non-covered services.     Patient would like the video invitation sent by:  My Chart    Mode of Communication:  Video Conference via Amwell    Distant Location (Provider):  Off-site    As the provider I attest to compliance with applicable laws and regulations related to telemedicine.    DATA  Interactive Complexity: No  Crisis: No        Progress Since Last Session (Related to Symptoms / Goals / Homework):   Symptoms: No change      Homework:  n/a      Episode of Care Goals: Satisfactory progress - ACTION (Actively working towards change); Intervened by reinforcing change plan / affirming steps taken     Current / Ongoing Stressors and Concerns:   Continued overwhelm with appointments, mail, etc.  Struggling with approaching holidays as this time has historically been emotionally difficult.  Felt tension in the staff relationships during ECT last week and she worries about changes in that program.   Irritability with cat and .  She had an argument with best friend during which friend said hurtful statements.     Treatment Objective(s) Addressed in This Session:   Distress tolerance     Intervention:   Relationship building.  Validation, encouragement.  Reflecting on emotional experiences, generational cycles. Distress tolerance.      Assessments completed prior to visit:  The following assessments were completed by patient for this visit:  PHQ9:       11/6/2024    12:50 PM 11/14/2024    11:26 AM 11/21/2024     8:31 AM 12/3/2024     9:10 AM 12/5/2024     2:43 PM 12/10/2024     9:24 AM 12/17/2024     9:11 AM   PHQ-9 SCORE   PHQ-9 Total Score MyChart 15 (Moderately severe depression) 14 (Moderate depression) 16 (Moderately severe depression) 17 (Moderately severe depression) 17 (Moderately severe depression) 15 (Moderately severe depression) 15 (Moderately severe depression)   PHQ-9 Total Score 15  14  16  17  17  15  15        Patient-reported     GAD7:       9/9/2024     1:33 PM 9/24/2024     9:54 AM 10/8/2024     6:29 AM 10/22/2024     8:59 AM 11/4/2024     2:38 PM 12/3/2024     9:10 AM 12/17/2024     9:13 AM   CHAVO-7 SCORE   Total Score 13 (moderate anxiety) 9 (mild anxiety) 9 (mild anxiety) 11 (moderate anxiety) 10 (moderate anxiety) 17 (severe anxiety) 13 (moderate anxiety)   Total Score 13 9 9    9 11    11 10  17  13        Patient-reported    Multiple values from one day are sorted in reverse-chronological order     CAGE-AID:       6/22/2020     7:12 PM 1/18/2022    11:15 PM 6/6/2024     8:00 AM   CAGE-AID Total Score   Total Score 4 4 4   Total Score MyChart 4 (A total score of 2 or greater is considered clinically significant) 4 (A total score of 2 or greater is considered clinically significant)      PROMIS 10-Global Health (all questions and answers displayed):       10/24/2024     1:00 PM 10/30/2024     8:48 AM 11/6/2024    12:54 PM 11/14/2024    11:35 AM 11/26/2024     5:00 PM 12/4/2024      9:28 AM 12/18/2024     9:10 AM   PROMIS 10   In general, would you say your health is:  Fair Fair Fair  Good Fair   In general, would you say your quality of life is:  Poor Poor Poor  Poor Fair   In general, how would you rate your physical health?  Poor Good Fair  Good Fair   In general, how would you rate your mental health, including your mood and your ability to think?  Fair Fair Fair  Fair Fair   In general, how would you rate your satisfaction with your social activities and relationships?  Fair Poor Poor  Poor Poor   In general, please rate how well you carry out your usual social activities and roles  Poor Poor Fair  Poor Poor   To what extent are you able to carry out your everyday physical activities such as walking, climbing stairs, carrying groceries, or moving a chair?  Moderately Moderately Moderately  Moderately Moderately   In the past 7 days, how often have you been bothered by emotional problems such as feeling anxious, depressed, or irritable?  Often Often Often  Often Often   In the past 7 days, how would you rate your fatigue on average?  Moderate Moderate Moderate  Moderate Moderate   In the past 7 days, how would you rate your pain on average, where 0 means no pain, and 10 means worst imaginable pain?  7 7 7  8 7   In general, would you say your health is:  2  2  2   3  2    In general, would you say your quality of life is:  1  1  1   1  2    In general, how would you rate your physical health?  1  3  2   3  2    In general, how would you rate your mental health, including your mood and your ability to think?  2  2  2   2  2    In general, how would you rate your satisfaction with your social activities and relationships?  2  1  1   1  1    In general, please rate how well you carry out your usual social activities and roles. (This includes activities at home, at work and in your community, and responsibilities as a parent, child, spouse, employee, friend, etc.)  1  1  2   1  1    To what  extent are you able to carry out your everyday physical activities such as walking, climbing stairs, carrying groceries, or moving a chair?  3  3  3   3  3    In the past 7 days, how often have you been bothered by emotional problems such as feeling anxious, depressed, or irritable?  4  4  4   4  4    In the past 7 days, how would you rate your fatigue on average?  3  3  3   3  3    In the past 7 days, how would you rate your pain on average, where 0 means no pain, and 10 means worst imaginable pain?  7  7  7   8  7    Global Mental Health Score  7  6  6   6  7    Global Physical Health Score  9  11  10   11  10    PROMIS TOTAL - SUBSCORES  16  17  16   17  17        Information is confidential and restricted. Go to Review Flowsheets to unlock data.    Patient-reported     [place] Suicide Severity Rating Scale (Lifetime/Recent)      6/11/2020    10:00 AM 1/9/2023     1:00 PM 5/21/2024     4:49 PM 5/21/2024     9:00 PM 6/6/2024     8:00 AM 7/10/2024    12:00 PM 11/22/2024    11:05 AM   [place] Suicide Severity Rating (Lifetime/Recent)   Q1 Wish to be Dead (Lifetime) Yes   Yes      Comments when patient was in treatment and there were family concerns   OD on medications      Q2 Non-Specific Active Suicidal Thoughts (Lifetime) Yes   No      Most Severe Ideation Rating (Lifetime) 5   5      Most Severe Ideation Description (Lifetime) when family problems were happening   OD on medication      Frequency (Lifetime) --   3      Duration (Lifetime) --   3      Controllability (Lifetime) 0   2      Protective Factors  (Lifetime) 5   4      Reasons for Ideation (Lifetime) --   5      Q1 Wished to be Dead (Past Month)  yes 1-->yes 1-->yes 1-->yes  0-->no   Q2 Suicidal Thoughts (Past Month)  no 1-->yes 1-->yes 1-->yes  0-->no   Q3 Suicidal Thought Method  no 0-->no 0-->no 1-->yes     Q4 Suicidal Intent without Specific Plan  no 1-->yes 0-->no 0-->no     Q5 Suicide Intent with Specific Plan  no 0-->no 0-->no 1-->yes     Q6  Suicide Behavior (Lifetime)  yes 1-->yes 0-->no 1-->yes  0-->no   If yes to Q6, within past 3 months?  no 1-->yes 0-->no 0-->no     Level of Risk per Screen  moderate risk high risk moderate risk high risk  no risks indicated   RETIRED: 1. Wish to be Dead (Recent) No         RETIRED: 2. Non-Specific Active Suicidal Thoughts (Recent) No         3. Active Suicidal Ideation with any Methods (Not Plan) Without Intent to Act (Lifetime) Yes         RETIRE: Active Suicidal Ideation with any Methods (Not Plan) Description (Lifetime) 30 years prior         RETIRE: 4. Active Suicidal Ideation with Some Intent to Act, Without Specific Plan (Lifetime) Yes         RETIRE: 5. Active Suicidal Ideation with Specific Plan and Intent (Lifetime) Yes         Actual Attempt (Lifetime) Yes         Actual Attempt Description (Lifetime) patient reported overdosing on Prozac about thirty years ago         Total Number of Actual Attempts (Lifetime) 1         Actual Attempt (Past 3 Months) No         Has subject engaged in non-suicidal self-injurious behavior? (Lifetime) Yes         Has subject engaged in non-suicidal self-injurious behavior? (Past 3 Months) No         Interrupted Attempts (Lifetime) No         Interrupted Attempts (Past 3 Months) No         Aborted or Self-Interrupted Attempt (Lifetime) No         Aborted or Self-Interrupted Attempt (Past 3 Months) No         Preparatory Acts or Behavior (Lifetime) No         Preparatory Acts or Behavior (Past 3 Months) No         Most Recent Attempt Date --         Comments 30 years prior         Most Recent Attempt Actual Lethality Code 0         Q1 Wish to be Dead (Lifetime)      N    Q2 Non-Specific Active Suicidal Thoughts (Lifetime)      N          ASSESSMENT: Current Emotional / Mental Status (status of significant symptoms):   Risk status (Self / Other harm or suicidal ideation)   Patient denies current fears or concerns for personal safety.   Patient reports the following current or  recent suicidal ideation or behaviors: passive ideation of wanting to escape the struggles, no plan, no intent.   Patient denies current or recent homicidal ideation or behaviors.   Patient denies current or recent self injurious behavior or ideation.   Patient denies other safety concerns.   Patient reports there has been no change in risk factors since their last session.     Patient reports there has been no change in protective factors since their last session.     A safety and risk management plan has been developed including: Patient consented to co-developed safety plan on 6/4/24.  Safety and risk management plan was reviewed.   Patient agreed to use safety plan should any safety concerns arise.  A copy was made available to the patient.     Appearance:   Appropriate    Eye Contact:   Good    Psychomotor Behavior: Normal    Attitude:   Cooperative  Pleasant   Orientation:   All   Speech    Rate / Production: Slow     Volume:  Normal    Mood:    Anxious  Depressed    Affect:    Subdued  Tearful   Thought Content:  Clear    Thought Form:  Tangential    Insight:    Good      Medication Review:   No changes to current psychiatric medication(s)     Medication Compliance:   Yes     Changes in Health Issues:   None reported     Chemical Use Review:   Substance Use: Chemical use reviewed, no active concerns identified      Tobacco Use: No current tobacco use.      Diagnosis:  1. Generalized anxiety disorder    2. Major depressive disorder, recurrent severe without psychotic features (H)        Collateral Reports Completed:   Not Applicable    PLAN: (Patient Tasks / Therapist Tasks / Other)  Focus on self-care, distress tolerance.  Therapist will check on status of specialty  referral.        Erika Colorado, XOCHITLFT                                                         ______________________________________________________________________    Individual Treatment Plan    Patient's Name: Randi NICOLE Morgan  Evin  YOB: 1955    Date of Creation: 7/17/2024  Date Treatment Plan Last Reviewed/Revised: 7/17/2024, 10/30/24     DSM5 Diagnoses:   296.33 (F33.2) Major Depressive Disorder, Recurrent Episode, Severe   300.02 (F41.1) Generalized Anxiety Disorder  Psychosocial / Contextual Factors: history of trauma  PROMIS (reviewed every 90 days):     Referral / Collaboration:  Discussed and patient will pursue a therapy group for depressive symptoms.    Anticipated number of session for this episode of care: 9-12 sessions  Anticipation frequency of session: Weekly  Anticipated Duration of each session: 38-52 minutes  Treatment plan will be reviewed in 90 days or when goals have been changed.       MeasurableTreatment Goal(s) related to diagnosis / functional impairment(s)  Goal 1: Client will keep self safe and eliminate suicidal ideation and self-harm behaviors.    Objective #A (Client Action)    Client will make a list of at least 10 skills or activities that you will to use to distract from urges to harm self.  Status: Completed - Date: 10/30/24       Intervention(s)  Therapist will provide ideas and strategies for generating coping skills list.    Objective #B  Client will make a list of pros and cons for tolerating and not tolerating an urge to harm self.  Status: Continued - Date(s):10/30/24      Intervention(s)  Therapist will guide client through DBT-style Pros/Cons list for behavior change.    Objective #C  Client will identify and practice the new strategies for dealing with strong emotions, learn and practice relaxation breathing.  Status: Continued - Date(s):10/30/24      Intervention(s)  Therapist will teach distraction skills. Practice them within session and outside of session.    Goal 2: Client will report reduction in anxiety also as evidenced by reduction of CHAVO-7 score below 6 points within the next 12 weeks.    Objective #A (Client Action)    Client will identify at least three triggers for  anxiety.  Status: Completed - Date: 10/30/24       Intervention(s)  Therapist will provide educational materials on common triggers and signs of anxiety.    Objective #B  Client will use relaxation strategies at least two times per day to reduce the physical symptoms of anxiety.  Status: Continued - Date(s):10/30/24      Intervention(s)  Therapist will teach relaxation strategies such as mindfulness, deep breathing, muscle relaxation, and sensory activities.    Objective #C  Client will use cognitive strategies identified in therapy to challenge anxious thoughts.  Status: Continued - Date(s):10/30/24      Intervention(s)  Therapist will teach strategies for cognitive modification using REBT model.    Goal 3: Client will report improved mood as indicated by PHQ-9 score below 6 for consistent 8 weeks.    Objective #A (Client Action)    Status: Continued - Date: 10/30/24     Client will Increase interest, engagement, and pleasure in doing things.    Intervention(s)  Therapist will assign homework for daily involvement in pleasant activities.    Objective #B  Client will Identify negative self-talk and behaviors: challenge core beliefs, myths, and actions.    Status: Continued - Date(s):10/30/24      Intervention(s)  Therapist will teach strategies for cognitive modification using REBT models.    Objective #C  Client will Improve quantity and quality of night time sleep / decrease daytime naps.  Status: Continued - Date(s):10/30/24      Intervention(s)  Therapist will teach sleep hygiene strategies.  Assign homework for daily practice.    Goal 4: Client will report reduced or eliminated physiological activation when recalling a distressing event including decreased re-experiencing, decreased avoidance, and decreased hyperarousal.    Objective #A (Client Action)    Status:  Continued: 10/30/24       Client will acquire knowledge of trauma, common symptoms post-trauma, emotion regulation strategies, stress management  strategies, and cognitive coping strategies.    Intervention(s)  Therapist will teach about effects of trauma on mind and body; encourage emotion identification and expression; practice with client the stress management strategies; and teach cognitive modification.    Objective #B  Client will use Brainspotting while focusing on physiological sensations related to distressing events.  Client will assess and rate level of physiological activation.  Status: Continued - Date(s):10/30/24     Intervention(s)  Therapist will provide use a pointer or other materials to assist client in holding a brainspot in their visual field.  Therapist might also provide biolateral music through headphones to deepen the experience.         Patient has reviewed and agreed to the above plan.      Erika Colorado, LMFT  July 17, 2024

## 2024-12-18 NOTE — PROGRESS NOTES
Interventional Psychiatry Program   Treatment Resistant Depression (TRD) Group  [facility], [facility]        Patient: Randi Cleary (1955)    MRN: [id number]  Date of Treatment: November 5, 2024  Duration of Treatment: Start Time: 10:30 am; End Time: 12:00  Providers/Group Facilitators: Arsenio Jiang, PhD, LP; Tiffany Mustafa MA (PhD student)    Number of Participants: 4  Group Format: In Person     People present:   Providers: Arsenio Jiang and Tiffany Mustafa  Patient: Randi Cleary  Others: Other patients    Diagnosis:  Major depressive disorder, severe, recurrent, without psychotic features  Generalized anxiety disorder      Assessment (current symptoms):       12/5/2024     2:43 PM 12/10/2024     9:24 AM 12/17/2024     9:11 AM   PHQ   PHQ-9 Total Score 17  15  15    Q9: Thoughts of better off dead/self-harm past 2 weeks Not at all  Not at all  Not at all        Patient-reported         11/4/2024     2:38 PM 12/3/2024     9:10 AM 12/17/2024     9:13 AM   CHAVO-7 SCORE   Total Score 10 (moderate anxiety) 17 (severe anxiety) 13 (moderate anxiety)   Total Score 10  17  13        Patient-reported     The treatment resistant depression (TRD) group is based on the cognitive behavioral therapy model that uses psychoeducation, behavioral activation, mindfulness, supportive techniques, problem solving techniques, and social skills.       Inclusion criteria for group participation: Current patient in the [facility] TRD program; agreement to group format and guidelines discussed in first session     Session 6 Content:  Mindfulness exercise (mindful visual field awareness) and discussion  Collected the BADS-SF  Check in on between session assignments  Elective topic: Review of Behavioral Activation and using elements of Positive Affect Treatment (imagining the positive, silver lining)  Assignment: complete selected scheduled positive activities, report  on progress at next group.    Description: Winnie was engaged in group and made supportive comments of other members as well as self-disclosure that was appropriate and aided the group learning.    Patient's reaction to treatment strategies: Pt was positively engaged in group and helpful to others. Pt shared about progress and difficulties with activity engagement, and they were amenable to ideas to try. Set small goals for the upcoming holiday break.    Patient's progress toward treatment goals: Pt expressed commitment to working in group therapy.      Mental Status Exam:  Alertness: alert and oriented  Appearance: well groomed  Behavior/Demeanor: cooperative and pleasant, with good eye contact   Speech: normal  Language: good. Preferred language identified as English.  Psychomotor: normal or unremarkable  Mood: depressed  Affect: restricted; was congruent to mood; was congruent to content  Thought Process/Associations: unremarkable  Perception: Reports intact    Insight: appears appropriate  Judgment: appears appropriate  Cognition: does appear grossly intact      Outpatient Treatment Plan     Today's Date: 11/10/24    Date of 1st group [email or web address]       Diagnosis:  Major depressive disorder, severe, recurrent, without psychotic features  Generalized anxiety disorder    Current symptoms and circumstances that substantiate the diagnosis:  Persistent dysphoria, anhedonia, sleep disturbance, low energy/fatigue, trouble concentrating, and interfering anxiety and irritability.     How symptoms and/or behaviors are affecting level of function:  Reduced social engagement/social isolation; minimal physical activity    Risk Assessment:  Suicide:  Assessed Level of Immediate Risk: Low  Ideation: denied thoughts of suicide or self-harm  Denies self-harm action  Plan:  denies plan for suicide and self-harm  Safety: denied safety concerns     Homicide/Violence:  Assessed Level of Immediate Risk: Low  Ideation:  Denies  Plan: n/a  Means: No  Intent: No          SYMPTOMS; PROBLEMS   MEASURABLE GOALS;    FUNCTIONAL IMPROVEMENT INTERVENTIONS Gains Made:     Depression reduce depressive symptoms, reduce depressive episodes, and report feeling more positive about self   Increase engagement with value-driven activities BA group therpay Pt is seeking to maintain gains     Frequency of Sessions: Weekly    Discharge and Aftercare Goals: see measurable goals above  Expected duration of treatment: 8 weeks  Participants in therapy plan (family, friends, support network): self     Patient verbally consented to the treatment plan above.      Arsenio Jiang, PhD LP on 12/18/2024 at 12:22 PM      Answers submitted by the patient for this visit:  Patient Health Questionnaire (Submitted on 12/17/2024)  If you checked off any problems, how difficult have these problems made it for you to do your work, take care of things at home, or get along with other people?: Very difficult  PHQ9 TOTAL SCORE: 15  Patient Health Questionnaire (G7) (Submitted on 12/17/2024)  CHAVO 7 TOTAL SCORE: 13

## 2024-12-19 ENCOUNTER — OFFICE VISIT (OUTPATIENT)
Dept: INTERNAL MEDICINE | Facility: CLINIC | Age: 69
End: 2024-12-19
Payer: MEDICARE

## 2024-12-19 VITALS
WEIGHT: 163 LBS | SYSTOLIC BLOOD PRESSURE: 120 MMHG | OXYGEN SATURATION: 95 % | HEIGHT: 66 IN | RESPIRATION RATE: 18 BRPM | HEART RATE: 69 BPM | DIASTOLIC BLOOD PRESSURE: 76 MMHG | TEMPERATURE: 97.9 F | BODY MASS INDEX: 26.2 KG/M2

## 2024-12-19 DIAGNOSIS — F41.8 DEPRESSION WITH ANXIETY: ICD-10-CM

## 2024-12-19 DIAGNOSIS — J42 CHRONIC BRONCHITIS, UNSPECIFIED CHRONIC BRONCHITIS TYPE (H): ICD-10-CM

## 2024-12-19 DIAGNOSIS — G89.29 CHRONIC PAIN OF BOTH SHOULDERS: ICD-10-CM

## 2024-12-19 DIAGNOSIS — F10.21 ALCOHOL USE DISORDER, MODERATE, IN SUSTAINED REMISSION (H): ICD-10-CM

## 2024-12-19 DIAGNOSIS — K21.00 GASTROESOPHAGEAL REFLUX DISEASE WITH ESOPHAGITIS WITHOUT HEMORRHAGE: ICD-10-CM

## 2024-12-19 DIAGNOSIS — Z01.818 PREOPERATIVE EXAMINATION: Primary | ICD-10-CM

## 2024-12-19 DIAGNOSIS — M25.511 CHRONIC PAIN OF BOTH SHOULDERS: ICD-10-CM

## 2024-12-19 DIAGNOSIS — E83.110 HEREDITARY HEMOCHROMATOSIS (H): ICD-10-CM

## 2024-12-19 DIAGNOSIS — M25.512 CHRONIC PAIN OF BOTH SHOULDERS: ICD-10-CM

## 2024-12-19 DIAGNOSIS — G47.33 OSA (OBSTRUCTIVE SLEEP APNEA): ICD-10-CM

## 2024-12-19 PROBLEM — I48.0 PAF (PAROXYSMAL ATRIAL FIBRILLATION) (H): Status: ACTIVE | Noted: 2024-11-22

## 2024-12-19 LAB
ALBUMIN SERPL BCG-MCNC: 4.5 G/DL (ref 3.5–5.2)
ALP SERPL-CCNC: 75 U/L (ref 40–150)
ALT SERPL W P-5'-P-CCNC: 15 U/L (ref 0–50)
ANION GAP SERPL CALCULATED.3IONS-SCNC: 11 MMOL/L (ref 7–15)
AST SERPL W P-5'-P-CCNC: 28 U/L (ref 0–45)
BILIRUB SERPL-MCNC: 0.5 MG/DL
BUN SERPL-MCNC: 14.2 MG/DL (ref 8–23)
CALCIUM SERPL-MCNC: 9.9 MG/DL (ref 8.8–10.4)
CHLORIDE SERPL-SCNC: 103 MMOL/L (ref 98–107)
CREAT SERPL-MCNC: 0.65 MG/DL (ref 0.51–0.95)
EGFRCR SERPLBLD CKD-EPI 2021: >90 ML/MIN/1.73M2
ERYTHROCYTE [DISTWIDTH] IN BLOOD BY AUTOMATED COUNT: 12.6 % (ref 10–15)
GLUCOSE SERPL-MCNC: 91 MG/DL (ref 70–99)
HCO3 SERPL-SCNC: 27 MMOL/L (ref 22–29)
HCT VFR BLD AUTO: 51.1 % (ref 35–47)
HGB BLD-MCNC: 17.8 G/DL (ref 11.7–15.7)
MCH RBC QN AUTO: 33.5 PG (ref 26.5–33)
MCHC RBC AUTO-ENTMCNC: 34.8 G/DL (ref 31.5–36.5)
MCV RBC AUTO: 96 FL (ref 78–100)
PLATELET # BLD AUTO: 215 10E3/UL (ref 150–450)
POTASSIUM SERPL-SCNC: 4.9 MMOL/L (ref 3.4–5.3)
PROT SERPL-MCNC: 7.7 G/DL (ref 6.4–8.3)
RBC # BLD AUTO: 5.31 10E6/UL (ref 3.8–5.2)
SODIUM SERPL-SCNC: 141 MMOL/L (ref 135–145)
WBC # BLD AUTO: 6.9 10E3/UL (ref 4–11)

## 2024-12-19 RX ORDER — GABAPENTIN 400 MG/1
400 CAPSULE ORAL AT BEDTIME
COMMUNITY

## 2024-12-19 NOTE — PROGRESS NOTES
Preoperative Evaluation  [facility]  [address] [facility] [address]  Phone: [phone]  Fax: [phone]  Primary Provider: Kaushik Hobbs CNP  Pre-op Performing Provider: Kaushik Hobbs CNP  Dec 19, 2024             12/18/2024   Surgical Information   What procedure is being done? Complete shoulder replacement   Facility or Hospital where procedure/surgery will be performed: [facility]   Who is doing the procedure / surgery? Dr. Kaur   Date of surgery / procedure: January 08, 2025   Time of surgery / procedure: AM   Where do you plan to recover after surgery? at home with family     Fax number for surgical facility: Note does not need to be faxed, will be available electronically in Epic.    Assessment & Plan     The proposed surgical procedure is considered INTERMEDIATE risk.    Preoperative examination  No contraindications for planned procedure.    She had an EKG completed this past November, that revealed sinus rhythm with no ischemic changes.    Plan to check a CBC and CMP today    - CBC with platelets; Future  - Comprehensive metabolic panel; Future    Chronic pain of both shoulders  End-stage osteoarthritis in her bilateral shoulders.  She is having one of her shoulders replaced soon although she is not sure which one    Paroxysmal atrial fibrillation  Patient presented to undergo scheduled ECT in November.  Anesthesiology had found new onset atrial fibrillation.  She was admitted and spontaneously converted to sinus rhythm.  There was a question of whether dehydration had been contributing.    When I saw her for follow-up after she was discharged she remained in sinus rhythm and appears in sinus rhythm again today.    Currently has a Zio patch in place.  She is completely asymptomatic and denies anginal symptoms.  Not anticoagulated.  Vital signs are excellent.    Chronic bronchitis, unspecified chronic bronchitis type (H)  Symptoms  controlled on Trelegy    KYAW (obstructive sleep apnea)  She is not sure if this is a true diagnosis.  If she does have KYAW, it is certainly not treated    Gastroesophageal reflux disease with esophagitis without hemorrhage  Controlled on omeprazole    Hereditary hemochromatosis (H)  Managed by hematology.  She has done therapeutic phlebotomies in the past but has not required any recently.  She had a ferritin level checked 3 weeks ago, normal     ADDENDUM: hemoglobin returned from lab at 17.8; this is not a contraindication for surgery but raises suspicion for untreated YKAW. She will be at higher risk of thromboembolic events and hydration will need to be optimized.     Depression with anxiety  She has a complicated psychiatric history.  She is following with psychiatry and psychology continues on trazodone, Viibryd, gabapentin.  She is also doing ECT    She sees Dr. Dubois at the pain clinic, on chronic oxycodone for chronic ankle pain    Alcohol use disorder, moderate, in sustained remission (H)  Remains abstinent from alcohol    History of Graves', status post ablation  TSH was checked 3 weeks ago and normal    Patient Instructions   Hold all supplements, aspirin and NSAIDs for 7 days prior to surgery.     Follow your surgeon's direction on when to stop eating and drinking prior to surgery.    Your surgeon will be managing your pain after your surgery.      Remove all jewelry and metal piercings before your surgery.     Remove nail polish from fingers before surgery.    If you use a CPAP machine, bring this with you to surgery.      The longitudinal plan of care for the diagnosis(es)/condition(s) as documented were addressed during this visit. Due to the added complexity in care, I will continue to support Winnie in the subsequent management and with ongoing continuity of care.       - No identified additional risk factors other than previously addressed         Recommendation  Approval given to proceed with  proposed procedure, without further diagnostic evaluation.    Reinier Zhou is a 69 year old, presenting for the following:  Pre-Op Exam (Right Reverse Total Shoulder Arthroplasty )          12/19/2024    11:02 AM   Additional Questions   Roomed by Alyssa BRADLEY related to upcoming procedure: As above        12/18/2024   Pre-Op Questionnaire   Have you ever had a heart attack or stroke? No   Have you ever had surgery on your heart or blood vessels, such as a stent placement, a coronary artery bypass, or surgery on an artery in your head, neck, heart, or legs? No   Do you have chest pain with activity? No   Do you have a history of heart failure? No   Do you currently have a cold, bronchitis or symptoms of other infection? No   Do you have a cough, shortness of breath, or wheezing? (!) UNKNOWN Chronic   Do you or anyone in your family have previous history of blood clots? (!) YES    Do you or does anyone in your family have a serious bleeding problem such as prolonged bleeding following surgeries or cuts? No   Have you ever had problems with anemia or been told to take iron pills? No   Have you had any abnormal blood loss such as black, tarry or bloody stools, or abnormal vaginal bleeding? No   Have you ever had a blood transfusion? No   Are you willing to have a blood transfusion if it is medically needed before, during, or after your surgery? Yes   Have you or any of your relatives ever had problems with anesthesia? No   Do you have sleep apnea, excessive snoring or daytime drowsiness? (!) YES   Do you have a CPAP machine? Yes   Do you have any artifical heart valves or other implanted medical devices like a pacemaker, defibrillator, or continuous glucose monitor? No   Do you have artificial joints? (!) YES   Are you allergic to latex? No     Health Care Directive  Patient does not have a Health Care Directive: Discussed advance care planning with patient; however, patient declined at this  time.    Preoperative Review of    reviewed - controlled substances reflected in medication list.          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     Priority: Medium    Chronic, continuous use of opioids 11/23/2024     Priority: Medium    Severe episode of recurrent major depressive disorder, without psychotic features (H) 11/23/2024     Priority: Medium    Status post electroconvulsive therapy 11/22/2024     Priority: Medium    Atrial fibrillation, unspecified type (H) 11/22/2024     Priority: Medium    MDD (major depressive disorder), recurrent episode, severe (H) 07/01/2024     Priority: Medium    Severe recurrent major depression without psychotic features (H) 05/22/2024     Priority: Medium    Depression with anxiety 05/21/2024     Priority: Medium    Post-menopausal 01/03/2024     Priority: Medium    Trauma and stressor-related disorder 06/29/2023     Priority: Medium    Numbness in both hands 03/16/2023     Priority: Medium    Opioid type dependence, continuous (H) 03/16/2023     Priority: Medium    Primary osteoarthritis involving multiple joints 01/11/2023     Priority: Medium    Gastroesophageal reflux disease with esophagitis without hemorrhage 01/11/2023     Priority: Medium    Psoriatic arthropathy (H) 06/02/2022     Priority: Medium    Alcohol use disorder, moderate, in sustained remission (H) 02/23/2022     Priority: Medium    Cord compression (H) 12/21/2021     Priority: Medium    History of cervical spinal surgery 12/21/2021     Priority: Medium     Angela Gregory MD   12/21/2021  4:11 PM   Spinal stenosis in cervical region [M48.02]  Cervical radiculopathy [M54.12]  CERVICAL 3-CERVICAL 6 LEFT OPEN DOOR LAMINOPLASTY AND LEFT CERVICAL 4-5 AND CERVICAL 6-7 POSTERIOR FORAMINOTOMY        Cervical stenosis of spinal canal  12/21/2021     Priority: Medium     CT C spine 12/2022: CANAL/FORAMINA: Reversal normal lordotic curvature. Multilevel spondylosis result in various levels and degrees of central canal stenosis and neural foraminal stenosis. C6-C7 severe central canal stenosis. Multilevel severe neural foraminal stenosis at   left C2-C3, bilateral C3-C4, left C4-C5, bilateral C5-C6, bilateral C6-C7, right C7-T1, right T1-T2, right T2-T3, left T3-T4.  Multiple levels demonstrate mild degenerative grade 1 subluxations.     PARASPINAL: No significant extraspinal abnormality.                                                         IMPRESSION:   1.  No acute fracture.   2.  Left C3 C6 laminoplasty.   3.  Degenerative changes described above.    MRI 5/1/23:   1. Surgical changes of left-sided open-door laminoplasty from C3 to  C6. No residual high-grade spinal canal stenosis. Mild spinal canal  stenosis is seen at the C6-7 level.  2. Multilevel neural foraminal stenosis detailed above. There is  moderate to severe right-sided neural foraminal stenosis at the C3-4.      Morbid obesity (H) 11/20/2021     Priority: Medium    Generalized anxiety disorder 07/15/2021     Priority: Medium    Restless leg syndrome 07/15/2021     Priority: Medium    Vitamin B12 deficiency 07/15/2021     Priority: Medium     Formatting of this note might be different from the original.  Created by Conversion      Vitamin D deficiency 07/15/2021     Priority: Medium     Formatting of this note might be different from the original.  Created by Conversion    Replacement Utility updated for latest IMO load      Hypokalemia 09/18/2020     Priority: Medium    Prediabetes      Priority: Medium    KYAW (obstructive sleep apnea) 12/11/2019     Priority: Medium    Postablative hypothyroidism 11/19/2019     Priority: Medium     Formatting of this note might be different from the original.  Created by Conversion    Replacement Utility updated for latest IMO load  Formatting of  this note might be different from the original.  Created by Conversion    Replacement Utility updated for latest IMO load      Hx of corticosteroid therapy 11/19/2019     Priority: Medium    Class 2 obesity due to excess calories without serious comorbidity in adult 11/19/2019     Priority: Medium    Hereditary hemochromatosis (H)      Priority: Medium    COPD (chronic obstructive pulmonary disease) (H) 12/02/2016     Priority: Medium     Created by Conversion        DJD (degenerative joint disease), ankle and foot 06/29/2015     Priority: Medium      Past Medical History:   Diagnosis Date    Bipolar 2 disorder (H)     Breast cancer (H) 1986    lumpectomy, radiation, chemo    Chronic pain syndrome     COPD (chronic obstructive pulmonary disease) (H)     asthma    Cord compression (H) 12/21/2021    Dizzy     Drug tolerance     opioid    Esophageal reflux     Fatigue     Generalized anxiety disorder     Graves disease 1994    Hemochromatosis 02/14/2018    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 Created by Conversion  Replacement Utility updated for latest IMO load Formatting of this note might be different from the original. Created by Conversion  Replacement Utility updated for latest IMO load    History of corticosteroid therapy 11/19/2019    History of partial adrenalectomy (H) 11/19/2019    History of pheochromocytoma 11/19/2019    Hx antineoplastic chemotherapy     Hx of radiation therapy     Hyperlipidaemia     Hypertension     Impaired fasting glucose 2017    Injury of neck, whiplash 07/15/2021    Joint pain     KYAW (obstructive sleep apnea) 2016    Osteopenia     Pheochromocytoma, left 08/02/2017    laparoscopically removed    Postablative hypothyroidism 1995    Prediabetes 10/03/2019    by A1c    Psoriasis     Psoriatic arthropathy (H)     Pulmonary hypertension (H)     Right rotator cuff tear     RLS (restless  legs syndrome)     on ropinorole    Sacroiliitis (H)     Serotonin syndrome 08/28/2020    [facility] - While on desvenlafaxine 100mg    Snoring     Spinal stenosis     Status post coronary angiogram 10/03/2019    Urinary incontinence     Vitamin B 12 deficiency 2009    Vitamin D deficiency 2010     Past Surgical History:   Procedure Laterality Date    ARTHRODESIS ANKLE      ARTHROPLASTY ANKLE Right 6/29/2015    Procedure: ARTHROPLASTY ANKLE;  Surgeon: Jason Coughlin MD;  Location: [facility]    ARTHROPLASTY REVISION ANKLE Right 6/29/2015    Procedure: ARTHROPLASTY REVISION ANKLE;  Surgeon: Jason Coughlin MD;  Location:  [facility]    BIOPSY BREAST      BREAST BIOPSY, CORE RT/LT      COLONOSCOPY      COLONOSCOPY N/A 2/25/2021    Procedure: COLONOSCOPY;  Surgeon: Guru Elke Tolbert MD;  Location:  GI    CV CORONARY ANGIOGRAM N/A 10/3/2019    Procedure: CV CORONARY ANGIOGRAM;  Surgeon: Bryce Pierre MD;  Location:  HEART CARDIAC CATH LAB    CV RIGHT HEART CATH MEASUREMENTS RECORDED N/A 10/3/2019    Procedure: CV RIGHT HEART CATH;  Surgeon: Bryce Pierre MD;  Location:  HEART CARDIAC CATH LAB    CV RIGHT HEART CATH MEASUREMENTS RECORDED N/A 9/25/2024    Procedure: Heart Cath Right Heart Cath;  Surgeon: George Becker MD;  Location:  HEART CARDIAC CATH LAB    ESOPHAGOSCOPY, GASTROSCOPY, DUODENOSCOPY (EGD), COMBINED N/A 2/25/2021    Procedure: ESOPHAGOGASTRODUODENOSCOPY, WITH BIOPSY;  Surgeon: Guru Elke Tolbert MD;  Location:  GI    EYE SURGERY  2021    HC REMOVE TONSILS/ADENOIDS,<13 Y/O      Description: Tonsillectomy With Adenoidectomy;  Recorded: 04/07/2010;    IR LUMBAR EPIDURAL STEROID INJECTION  10/26/2004    IR LUMBAR EPIDURAL STEROID INJECTION  11/16/2004    IR LUMBAR EPIDURAL STEROID INJECTION  12/21/2004    IR LUMBAR EPIDURAL STEROID INJECTION  6/8/2006    JOINT REPLACEMENT      LAMINOPLASTY CERVICAL POSTERIOR THREE+ LEVELS Left 12/21/2021     Procedure: CERVICAL 3-CERVICAL 6 LEFT OPEN DOOR LAMINOPLASTY AND LEFT CERVICAL 4-5 AND CERVICAL 6-7 POSTERIOR FORAMINOTOMY;  Surgeon: Angela Gregory MD;  Location: [facility] OR    LAPAROSCOPIC ADRENALECTOMY Left 08/02/2017    pheochromocytoma    LAPAROSCOPIC ADRENALECTOMY Left 8/2/2017    Procedure: LAPAROSCOPIC LEFT ADRENALECTOMY, ;  Surgeon: Gab Linares MD;  Location: [facility] OR;  Service:     LENGTHEN TENDON ACHILLES Right 6/29/2015    Procedure: LENGTHEN TENDON ACHILLES;  Surgeon: Jason Coughlin MD;  Location: [facility]    LUMPECTOMY BREAST      LUMPECTOMY BREAST Left 1994    MAMMOPLASTY REDUCTION Right 01/13/2015    [place]    MAMMOPLASTY REDUCTION Right     approx late 2015/early2016    MASTECTOMY      left lumpectomy with axillary node dissection    MASTECTOMY MODIFIED RADICAL      OTHER SURGICAL HISTORY Right     reconstructive breast surgery    OTHER SURGICAL HISTORY      Adrenalectomy for pheochromocytoma    ME MASTECTOMY, MODIFIED RADICAL      Description: Modified Radical Mastectomy Left Breast;  Recorded: 04/07/2010;    REPAIR HAMMER TOE Right 6/29/2015    Procedure: REPAIR HAMMER TOE;  Surgeon: Jason Coughlin MD;  Location: [facility]    TONSILLECTOMY      TONSILLECTOMY & ADENOIDECTOMY      ZZC ARTHRODESIS,ANKLE,OPEN Right     Description: Ankle Arthrodesis;  Recorded: 04/07/2010;     Current Outpatient Medications   Medication Sig Dispense Refill    acetaminophen (TYLENOL) 325 MG tablet Take 325-650 mg by mouth every 6 hours as needed for mild pain.      albuterol (VENTOLIN HFA) 108 (90 Base) MCG/ACT inhaler Inhale 2 puffs into the lungs every 6 hours as needed for shortness of breath, wheezing or cough 18 g 11    allopurinol (ZYLOPRIM) 300 MG tablet Take 1 tablet (300 mg) by mouth as needed.      Cyanocobalamin (VITAMIN B-12) 5000 MCG SUBL Place 2-3 sprays under the tongue every morning. Unknown dose. 2 or 3 sprays/day      Fluticasone-Umeclidin-Vilanterol (TRELEGY ELLIPTA)  200-62.5-25 MCG/ACT oral inhaler Inhale 1 puff into the lungs daily. (Patient taking differently: Inhale 1 puff into the lungs every morning.) 60 each 11    gabapentin (NEURONTIN) 100 MG capsule Take 1-3 capsules (100-300 mg) by mouth daily as needed for other (anxiety). 90 capsule 0    gabapentin (NEURONTIN) 300 MG capsule Take 1-2 capsules (300-600 mg) by mouth at bedtime. 60 capsule 1    gabapentin (NEURONTIN) 400 MG capsule Take 400 mg by mouth at bedtime. Along with 3 - 100 mg tablets      guaiFENesin (MUCINEX) 600 MG 12 hr tablet Take 600 mg by mouth every morning.      ketoconazole (NIZORAL) 2 % external shampoo Apply topically daily as needed.      KLOR-CON M20 20 MEQ CR tablet Take 1 tablet (20 mEq) by mouth every morning. 90 tablet 3    MAGNESIUM PO Take 2 capsules by mouth 2 times daily. Strength unknown      medical cannabis (Patient's own supply) See Admin Instructions (The purpose of this order is to document that the patient reports taking medical cannabis.  This is not a prescription, and is not used to certify that the patient has a qualifying medical condition.)  Flower      naloxone (NARCAN) 4 MG/0.1ML nasal spray Spray 1 spray (4 mg) into one nostril alternating nostrils as needed for opioid reversal every 2-3 minutes until assistance arrives 0.2 mL 0    omeprazole (PRILOSEC) 20 MG DR capsule Take 1 capsule (20 mg) by mouth as needed.      oxyCODONE (ROXICODONE) 5 MG tablet Take 1 tablet (5 mg) by mouth 5 times daily. May dispense 12/11/24 for start 12/12/24 70 tablet 0    rOPINIRole (REQUIP) 2 MG tablet Take 1 tablet (2 mg) by mouth 3 times daily. One tab 3 pm, one bedtime, and one during night on waking 270 tablet 3    STATIN NOT PRESCRIBED (INTENTIONAL) Please choose reason not prescribed from choices below.      SYNTHROID 150 MCG tablet Take 1 tablet (150 mcg) by mouth every morning. MON to SAT 1 tablet/day; SUN 0.5 tablet - confirmed dosing with patient 11/22      traZODone (DESYREL) 50 MG  "tablet Take 1-2 tablets ( mg) by mouth nightly as needed for sleep. 60 tablet 1    UNABLE TO FIND Take 1 tablet by mouth every morning. MEDICATION NAME: CETYL-MYRISFOLEATE      vilazodone (VIIBRYD) 20 MG TABS tablet Take 1 tablet (20 mg) by mouth daily with food. 30 tablet 1    vitamin C (ASCORBIC ACID) 1000 MG TABS Take 1,000 mg by mouth every morning.      vitamin D3 (CHOLECALCIFEROL) 250 mcg (37771 units) capsule Take 1 capsule by mouth once a week.       benzonatate (TESSALON) 100 MG capsule Take 1 capsule (100 mg) by mouth 3 times daily as needed for cough. (Patient not taking: Reported on 2024) 90 capsule 1       Allergies   Allergen Reactions    Serotonin Reuptake Inhibitors (Ssris) Anxiety, Difficulty breathing, Headache, Palpitations and Shortness Of Breath    Buspirone      The patient states she had serotonin syndrome    Cephalexin      Other reaction(s): unknown rxn.    Desvenlafaxine      Serotonin syndrome    Diclofenac Sodium [Diclofenac]      Serotonin syndrome and restless legs syndrome    Gabapentin      Drove on the wrong side of the highway    Levofloxacin      \"CAN'T REMEMBER\"    Penicillins      \"SORES IN MOUTH\"    Riluzole Difficulty breathing and Swelling    Sulfa Antibiotics      \"PT DOES NOT KNOW WHAT THE REACTION WAS\"    Topiramate Other (See Comments)     Frequent urination        Social History     Tobacco Use    Smoking status: Former     Current packs/day: 0.00     Average packs/day: 2.5 packs/day for 29.2 years (72.9 ttl pk-yrs)     Types: Cigarettes     Start date: 1971     Quit date: 2000     Years since quittin.4     Passive exposure: Current    Smokeless tobacco: Never   Substance Use Topics    Alcohol use: Not Currently     Comment: relapse 2021 sober        History   Drug Use    Types: Marijuana     Comment: smoking and edibles daily               Objective    /76 (BP Location: Right arm, Patient Position: Sitting, Cuff Size: Adult " "Regular)   Pulse 69   Temp 97.9  F (36.6  C)   Resp 18   Ht 1.676 m (5' 6\")   Wt 73.9 kg (163 lb)   LMP  (LMP Unknown)   SpO2 95%   BMI 26.31 kg/m     Estimated body mass index is 26.31 kg/m  as calculated from the following:    Height as of this encounter: 1.676 m (5' 6\").    Weight as of this encounter: 73.9 kg (163 lb).  Physical Exam  GENERAL: alert and no distress  NECK: no adenopathy, no asymmetry, masses, or scars  RESP: lungs clear to auscultation - no rales, rhonchi or wheezes  CV: regular rate and rhythm, normal S1 S2, no S3 or S4, no murmur, click or rub, no peripheral edema  ABDOMEN: soft, nontender, no hepatosplenomegaly, no masses and bowel sounds normal  MS: no gross musculoskeletal defects noted, no edema    Recent Labs   Lab Test 11/23/24  1620 11/23/24  0618 11/22/24  1129 09/25/24  1144 09/25/24  0940 08/22/24  1105 08/21/24  0956 06/04/24  1226 05/22/24  0802   HGB  --  14.9 16.9*   < > 17.2*   < >  --    < >  --    PLT  --  153 188  --  277   < >  --    < >  --    INR 0.99  --   --   --  1.01  --   --   --   --    NA  --  144 140  --  140   < >  --    < >  --    POTASSIUM  --  3.6 3.6  --  4.6   < >  --    < >  --    CR  --  0.64 0.60  --  0.54   < >  --    < >  --    A1C  --   --   --   --   --   --  5.7*  --  5.6    < > = values in this interval not displayed.        Diagnostics  Labs pending at this time.  Results will be reviewed when available.   No EKG this visit, completed in the last 90 days.    Revised Cardiac Risk Index (RCRI)  The patient has the following serious cardiovascular risks for perioperative complications:   - No serious cardiac risks = 0 points     RCRI Interpretation: 1 point: Class II (low risk - 0.9% complication rate)         Signed Electronically by: Kaushik Hobbs CNP  A copy of this evaluation report is provided to the requesting physician.        "

## 2024-12-19 NOTE — PATIENT INSTRUCTIONS
Hold all supplements, aspirin and NSAIDs for 7 days prior to surgery.     Follow your surgeon's direction on when to stop eating and drinking prior to surgery.    Your surgeon will be managing your pain after your surgery.      Remove all jewelry and metal piercings before your surgery.     Remove nail polish from fingers before surgery.    If you use a CPAP machine, bring this with you to surgery.

## 2024-12-21 ENCOUNTER — HEALTH MAINTENANCE LETTER (OUTPATIENT)
Age: 69
End: 2024-12-21

## 2024-12-23 ENCOUNTER — VIRTUAL VISIT (OUTPATIENT)
Dept: PSYCHOLOGY | Facility: CLINIC | Age: 69
End: 2024-12-23
Payer: MEDICARE

## 2024-12-23 DIAGNOSIS — F41.1 GENERALIZED ANXIETY DISORDER: Primary | ICD-10-CM

## 2024-12-23 DIAGNOSIS — F33.2 MAJOR DEPRESSIVE DISORDER, RECURRENT SEVERE WITHOUT PSYCHOTIC FEATURES (H): ICD-10-CM

## 2024-12-23 PROCEDURE — 90837 PSYTX W PT 60 MINUTES: CPT | Mod: 95 | Performed by: MARRIAGE & FAMILY THERAPIST

## 2024-12-23 ASSESSMENT — PATIENT HEALTH QUESTIONNAIRE - PHQ9
10. IF YOU CHECKED OFF ANY PROBLEMS, HOW DIFFICULT HAVE THESE PROBLEMS MADE IT FOR YOU TO DO YOUR WORK, TAKE CARE OF THINGS AT HOME, OR GET ALONG WITH OTHER PEOPLE: NOT DIFFICULT AT ALL
SUM OF ALL RESPONSES TO PHQ QUESTIONS 1-9: 0

## 2024-12-23 NOTE — PROGRESS NOTES
[facility]                                     Progress Note    Patient Name: Randi Cleary  Date: 2024       Service Type: Individual      Session Start Time: 7:30  Session End Time: 8:26     Session Length: 53+    Session #: 13    Attendees: Client    Service Modality:  Video Visit:      Provider verified identity through the following two step process.  Patient provided:  Patient  and Patient address    Telemedicine Visit: The patient's condition can be safely assessed and treated via synchronous audio and visual telemedicine encounter.      Reason for Telemedicine Visit: Patient has requested telehealth visit    Originating Site (Patient Location): Patient's home    Distant Site (Provider Location): Provider Remote Setting- Home Office    Consent:  The patient/guardian has verbally consented to: the potential risks and benefits of telemedicine (video visit) versus in person care; bill my insurance or make self-payment for services provided; and responsibility for payment of non-covered services.     Patient would like the video invitation sent by:  My Chart    Mode of Communication:  Video Conference via Amwell    Distant Location (Provider):  Off-site    As the provider I attest to compliance with applicable laws and regulations related to telemedicine.    DATA  Interactive Complexity: No  Crisis: No        Progress Since Last Session (Related to Symptoms / Goals / Homework):   Symptoms: No change      Homework:  n/a      Episode of Care Goals: Satisfactory progress - ACTION (Actively working towards change); Intervened by reinforcing change plan / affirming steps taken     Current / Ongoing Stressors and Concerns:   Anticipating surgery next month.  Reflecting on numerous surgeries and health conditions she has experienced.  Hoping to minimize the clutter around house before upcoming surgery.  Noticing her irritability and feeling on edge.      Treatment Objective(s)  Addressed in This Session:   Distress tolerance     Intervention:   Relationship building.  Validation, encouragement.  Reflecting on emotional experiences, generational cycles. Distress tolerance.  Small steps toward larger goals.     Assessments completed prior to visit:  The following assessments were completed by patient for this visit:  PHQ9:       11/14/2024    11:26 AM 11/21/2024     8:31 AM 12/3/2024     9:10 AM 12/5/2024     2:43 PM 12/10/2024     9:24 AM 12/17/2024     9:11 AM 12/23/2024     7:30 AM   PHQ-9 SCORE   PHQ-9 Total Score MyChart 14 (Moderate depression) 16 (Moderately severe depression) 17 (Moderately severe depression) 17 (Moderately severe depression) 15 (Moderately severe depression) 15 (Moderately severe depression) 0   PHQ-9 Total Score 14  16  17  17  15  15  15       Patient-reported     GAD7:       9/9/2024     1:33 PM 9/24/2024     9:54 AM 10/8/2024     6:29 AM 10/22/2024     8:59 AM 11/4/2024     2:38 PM 12/3/2024     9:10 AM 12/17/2024     9:13 AM   CHAVO-7 SCORE   Total Score 13 (moderate anxiety) 9 (mild anxiety) 9 (mild anxiety) 11 (moderate anxiety) 10 (moderate anxiety) 17 (severe anxiety) 13 (moderate anxiety)   Total Score 13 9 9    9 11    11 10  17  13        Patient-reported     CAGE-AID:       6/22/2020     7:12 PM 1/18/2022    11:15 PM 6/6/2024     8:00 AM   CAGE-AID Total Score   Total Score 4 4 4   Total Score MyChart 4 (A total score of 2 or greater is considered clinically significant) 4 (A total score of 2 or greater is considered clinically significant)      PROMIS 10-Global Health (all questions and answers displayed):       10/24/2024     1:00 PM 10/30/2024     8:48 AM 11/6/2024    12:54 PM 11/14/2024    11:35 AM 11/26/2024     5:00 PM 12/4/2024     9:28 AM 12/18/2024     9:10 AM   PROMIS 10   In general, would you say your health is:  Fair Fair Fair  Good Fair   In general, would you say your quality of life is:  Poor Poor Poor  Poor Fair   In general, how would  you rate your physical health?  Poor Good Fair  Good Fair   In general, how would you rate your mental health, including your mood and your ability to think?  Fair Fair Fair  Fair Fair   In general, how would you rate your satisfaction with your social activities and relationships?  Fair Poor Poor  Poor Poor   In general, please rate how well you carry out your usual social activities and roles  Poor Poor Fair  Poor Poor   To what extent are you able to carry out your everyday physical activities such as walking, climbing stairs, carrying groceries, or moving a chair?  Moderately Moderately Moderately  Moderately Moderately   In the past 7 days, how often have you been bothered by emotional problems such as feeling anxious, depressed, or irritable?  Often Often Often  Often Often   In the past 7 days, how would you rate your fatigue on average?  Moderate Moderate Moderate  Moderate Moderate   In the past 7 days, how would you rate your pain on average, where 0 means no pain, and 10 means worst imaginable pain?  7 7 7  8 7   In general, would you say your health is:  2 2 2  3 2   In general, would you say your quality of life is:  1 1 1  1 2   In general, how would you rate your physical health?  1 3 2  3 2   In general, how would you rate your mental health, including your mood and your ability to think?  2 2 2  2 2   In general, how would you rate your satisfaction with your social activities and relationships?  2 1 1  1 1   In general, please rate how well you carry out your usual social activities and roles. (This includes activities at home, at work and in your community, and responsibilities as a parent, child, spouse, employee, friend, etc.)  1 1 2  1 1   To what extent are you able to carry out your everyday physical activities such as walking, climbing stairs, carrying groceries, or moving a chair?  3 3 3  3 3   In the past 7 days, how often have you been bothered by emotional problems such as feeling  anxious, depressed, or irritable?  4 4 4  4 4   In the past 7 days, how would you rate your fatigue on average?  3 3 3  3 3   In the past 7 days, how would you rate your pain on average, where 0 means no pain, and 10 means worst imaginable pain?  7 7 7  8 7   Global Mental Health Score  7  6  6   6  7    Global Physical Health Score  9  11  10   11  10    PROMIS TOTAL - SUBSCORES  16  17  16   17  17        Information is confidential and restricted. Go to Review Flowsheets to unlock data.    Patient-reported     Paradox Suicide Severity Rating Scale (Lifetime/Recent)      6/11/2020    10:00 AM 1/9/2023     1:00 PM 5/21/2024     4:49 PM 5/21/2024     9:00 PM 6/6/2024     8:00 AM 7/10/2024    12:00 PM 11/22/2024    11:05 AM   Paradox Suicide Severity Rating (Lifetime/Recent)   Q1 Wish to be Dead (Lifetime) Yes   Yes      Comments when patient was in treatment and there were family concerns   OD on medications      Q2 Non-Specific Active Suicidal Thoughts (Lifetime) Yes   No      Most Severe Ideation Rating (Lifetime) 5   5      Most Severe Ideation Description (Lifetime) when family problems were happening   OD on medication      Frequency (Lifetime) --   3      Duration (Lifetime) --   3      Controllability (Lifetime) 0   2      Protective Factors  (Lifetime) 5   4      Reasons for Ideation (Lifetime) --   5      Q1 Wished to be Dead (Past Month)  yes 1-->yes 1-->yes 1-->yes  0-->no   Q2 Suicidal Thoughts (Past Month)  no 1-->yes 1-->yes 1-->yes  0-->no   Q3 Suicidal Thought Method  no 0-->no 0-->no 1-->yes     Q4 Suicidal Intent without Specific Plan  no 1-->yes 0-->no 0-->no     Q5 Suicide Intent with Specific Plan  no 0-->no 0-->no 1-->yes     Q6 Suicide Behavior (Lifetime)  yes 1-->yes 0-->no 1-->yes  0-->no   If yes to Q6, within past 3 months?  no 1-->yes 0-->no 0-->no     Level of Risk per Screen  moderate risk high risk moderate risk high risk  no risks indicated   RETIRED: 1. Wish to be Dead (Recent) No          RETIRED: 2. Non-Specific Active Suicidal Thoughts (Recent) No         3. Active Suicidal Ideation with any Methods (Not Plan) Without Intent to Act (Lifetime) Yes         RETIRE: Active Suicidal Ideation with any Methods (Not Plan) Description (Lifetime) 30 years prior         RETIRE: 4. Active Suicidal Ideation with Some Intent to Act, Without Specific Plan (Lifetime) Yes         RETIRE: 5. Active Suicidal Ideation with Specific Plan and Intent (Lifetime) Yes         Actual Attempt (Lifetime) Yes         Actual Attempt Description (Lifetime) patient reported overdosing on Prozac about thirty years ago         Total Number of Actual Attempts (Lifetime) 1         Actual Attempt (Past 3 Months) No         Has subject engaged in non-suicidal self-injurious behavior? (Lifetime) Yes         Has subject engaged in non-suicidal self-injurious behavior? (Past 3 Months) No         Interrupted Attempts (Lifetime) No         Interrupted Attempts (Past 3 Months) No         Aborted or Self-Interrupted Attempt (Lifetime) No         Aborted or Self-Interrupted Attempt (Past 3 Months) No         Preparatory Acts or Behavior (Lifetime) No         Preparatory Acts or Behavior (Past 3 Months) No         Most Recent Attempt Date --         Comments 30 years prior         Most Recent Attempt Actual Lethality Code 0         Q1 Wish to be Dead (Lifetime)      N    Q2 Non-Specific Active Suicidal Thoughts (Lifetime)      N          ASSESSMENT: Current Emotional / Mental Status (status of significant symptoms):   Risk status (Self / Other harm or suicidal ideation)   Patient denies current fears or concerns for personal safety.   Patient reports the following current or recent suicidal ideation or behaviors: passive ideation of wanting to escape the struggles, no plan, no intent.   Patient denies current or recent homicidal ideation or behaviors.   Patient denies current or recent self injurious behavior or ideation.   Patient denies  other safety concerns.   Patient reports there has been no change in risk factors since their last session.     Patient reports there has been no change in protective factors since their last session.     A safety and risk management plan has been developed including: Patient consented to co-developed safety plan on 6/4/24.  Safety and risk management plan was reviewed.   Patient agreed to use safety plan should any safety concerns arise.  A copy was made available to the patient.     Appearance:   Appropriate    Eye Contact:   Good    Psychomotor Behavior: Normal    Attitude:   Cooperative  Pleasant   Orientation:   All   Speech    Rate / Production: Slow     Volume:  Normal    Mood:    Anxious    Affect:    Appropriate  Subdued    Thought Content:  Clear    Thought Form:  Tangential    Insight:    Good      Medication Review:   No changes to current psychiatric medication(s)     Medication Compliance:   Yes     Changes in Health Issues:   None reported     Chemical Use Review:   Substance Use: Chemical use reviewed, no active concerns identified      Tobacco Use: No current tobacco use.      Diagnosis:  1. Generalized anxiety disorder    2. Major depressive disorder, recurrent severe without psychotic features (H)        Collateral Reports Completed:   Not Applicable    PLAN: (Patient Tasks / Therapist Tasks / Other)  Focus on self-care, distress tolerance.       EWELINA Billingsley                                                         ______________________________________________________________________    Individual Treatment Plan    Patient's Name: Randi Cleary  YOB: 1955    Date of Creation: 7/17/2024  Date Treatment Plan Last Reviewed/Revised: 7/17/2024, 10/30/24     DSM5 Diagnoses:   296.33 (F33.2) Major Depressive Disorder, Recurrent Episode, Severe   300.02 (F41.1) Generalized Anxiety Disorder  Psychosocial / Contextual Factors: history of trauma  PROMIS (reviewed every 90  days):     Referral / Collaboration:  Discussed and patient will pursue a therapy group for depressive symptoms.    Anticipated number of session for this episode of care: 9-12 sessions  Anticipation frequency of session: Weekly  Anticipated Duration of each session: 38-52 minutes  Treatment plan will be reviewed in 90 days or when goals have been changed.       MeasurableTreatment Goal(s) related to diagnosis / functional impairment(s)  Goal 1: Client will keep self safe and eliminate suicidal ideation and self-harm behaviors.    Objective #A (Client Action)    Client will make a list of at least 10 skills or activities that you will to use to distract from urges to harm self.  Status: Completed - Date: 10/30/24       Intervention(s)  Therapist will provide ideas and strategies for generating coping skills list.    Objective #B  Client will make a list of pros and cons for tolerating and not tolerating an urge to harm self.  Status: Continued - Date(s):10/30/24      Intervention(s)  Therapist will guide client through DBT-style Pros/Cons list for behavior change.    Objective #C  Client will identify and practice the new strategies for dealing with strong emotions, learn and practice relaxation breathing.  Status: Continued - Date(s):10/30/24      Intervention(s)  Therapist will teach distraction skills. Practice them within session and outside of session.    Goal 2: Client will report reduction in anxiety also as evidenced by reduction of CHAVO-7 score below 6 points within the next 12 weeks.    Objective #A (Client Action)    Client will identify at least three triggers for anxiety.  Status: Completed - Date: 10/30/24       Intervention(s)  Therapist will provide educational materials on common triggers and signs of anxiety.    Objective #B  Client will use relaxation strategies at least two times per day to reduce the physical symptoms of anxiety.  Status: Continued - Date(s):10/30/24       Intervention(s)  Therapist will teach relaxation strategies such as mindfulness, deep breathing, muscle relaxation, and sensory activities.    Objective #C  Client will use cognitive strategies identified in therapy to challenge anxious thoughts.  Status: Continued - Date(s):10/30/24      Intervention(s)  Therapist will teach strategies for cognitive modification using REBT model.    Goal 3: Client will report improved mood as indicated by PHQ-9 score below 6 for consistent 8 weeks.    Objective #A (Client Action)    Status: Continued - Date: 10/30/24     Client will Increase interest, engagement, and pleasure in doing things.    Intervention(s)  Therapist will assign homework for daily involvement in pleasant activities.    Objective #B  Client will Identify negative self-talk and behaviors: challenge core beliefs, myths, and actions.    Status: Continued - Date(s):10/30/24      Intervention(s)  Therapist will teach strategies for cognitive modification using REBT models.    Objective #C  Client will Improve quantity and quality of night time sleep / decrease daytime naps.  Status: Continued - Date(s):10/30/24      Intervention(s)  Therapist will teach sleep hygiene strategies.  Assign homework for daily practice.    Goal 4: Client will report reduced or eliminated physiological activation when recalling a distressing event including decreased re-experiencing, decreased avoidance, and decreased hyperarousal.    Objective #A (Client Action)    Status:  Continued: 10/30/24       Client will acquire knowledge of trauma, common symptoms post-trauma, emotion regulation strategies, stress management strategies, and cognitive coping strategies.    Intervention(s)  Therapist will teach about effects of trauma on mind and body; encourage emotion identification and expression; practice with client the stress management strategies; and teach cognitive modification.    Objective #B  Client will use Brainspotting while  focusing on physiological sensations related to distressing events.  Client will assess and rate level of physiological activation.  Status: Continued - Date(s):10/30/24     Intervention(s)  Therapist will provide use a pointer or other materials to assist client in holding a brainspot in their visual field.  Therapist might also provide biolateral music through headphones to deepen the experience.         Patient has reviewed and agreed to the above plan.      Erika Colorado, LMFT  July 17, 2024

## 2024-12-26 ASSESSMENT — PATIENT HEALTH QUESTIONNAIRE - PHQ9: SUM OF ALL RESPONSES TO PHQ QUESTIONS 1-9: 15

## 2024-12-30 ENCOUNTER — TRANSFERRED RECORDS (OUTPATIENT)
Dept: HEALTH INFORMATION MANAGEMENT | Facility: CLINIC | Age: 69
End: 2024-12-30
Payer: MEDICARE

## 2024-12-30 NOTE — PROGRESS NOTES
Medication Therapy Management (MTM) Encounter    ASSESSMENT:                            Medication Adherence/Access: No issues identified.    Depression/anxiety/sleep/RLS: :   Discussed that daytime fatigue could be related to multiple issues including general mood, cannabis use, and hangover effect from sleeping medications. Could consider stopping melatonin as a first step, then possibly reduce gabapentin from 500mg to 400mg at bedtime.   She will meet with psychiatry in a few days and may consider increasing vilazodone, as it seems to be well tolerated. May provide some extra support during this next month when she will go 4 weeks between ECT sessions instead of 2, as usual.    PLAN:                            -consider stopping melatonin. Discuss with Jeannette on Friday    Follow-up: 3-6 months or sooner if needed    SUBJECTIVE/OBJECTIVE:                          Winnie Cleary is a 69 year old female seen for a follow-up visit.       Reason for visit: med check.    Allergies/ADRs: Reviewed in chart  Past Medical History: Reviewed in chart  Tobacco: She reports that she quit smoking about 24 years ago. Her smoking use included cigarettes. She started smoking about 53 years ago. She has a 72.9 pack-year smoking history. She has been exposed to tobacco smoke. She has never used smokeless tobacco.  Alcohol: not currently using    Medication Adherence/Access: no issues reported.    Depression/anxiety/sleep/RLS:   -vilazodone 20mg daily  -gabapentin 100mg TID prn  -gabapentin 500mg at bedtime  -trazodone 100mg at bedtime  -melatonin 1mg at bedtime  --ropinirole 2mg at 3pm, 6pm, bedtime  -cannabis prn anxiety    Recently, Auvelity was stopped and vilazodone restarted due to concerns for tinnitus, appetite suppression, dizziness, urinary frequency, headaches, nervousness, and shakiness. Vilazodone was then increased to 20mg at 12/2/24 office visit.  Today, patient reported that her smart bed shows she's not been getting  quality sleep, but thinks something might not be working properly. She is falling asleep without issue. Isn't waking during the night and denied any concerns for restless legs. Stated that she adjusted her med timing around a little, which she thinks has been helpful for improved sleep in the last week or so.  She had an episode of atrial fibrillation and was not able to get ECT as planned, so her schedule has changed and she is now with a different team, which has been a bit stressful for her. Also notes some changes to the scheduling that have caused lots of confusion and stress. She has been feeling more easy to anger recently and wonders if missing an ECT session has contributed. She will have to skip a session due to upcoming surgery and worries about mood implications.  She is using cannabis during the day for anxiety, which she finds very helpful, though may make her tired.    ----------------    I spent 40 minutes with this patient today.     A summary of these recommendations was sent via clinic portal.    Gi SpragueD  Medication Therapy Management Pharmacist  [facility]      Telemedicine Visit Details  The patient's medications can be safely assessed via a telemedicine encounter.  Type of service:  Video Conference via StarGreetz  Originating Location (pt. Location): Home    Distant Location (provider location):  Off-site  Start Time:  8:00am  End Time:  8:40am     Medication Therapy Recommendations  Insomnia, unspecified type   1 Rationale: Undesirable effect - Adverse medication event - Safety   Recommendation: Make Appt with PCP - consider stopping melatonin; discuss with Jeannette   Status: Patient Agreed - Adherence/Education   Identified Date: 12/31/2024 Completed Date: 12/31/2024

## 2024-12-31 ENCOUNTER — VIRTUAL VISIT (OUTPATIENT)
Dept: PHARMACY | Facility: CLINIC | Age: 69
End: 2024-12-31
Payer: COMMERCIAL

## 2024-12-31 ENCOUNTER — OFFICE VISIT (OUTPATIENT)
Dept: PALLIATIVE MEDICINE | Facility: OTHER | Age: 69
End: 2024-12-31
Payer: MEDICARE

## 2024-12-31 VITALS
HEART RATE: 70 BPM | DIASTOLIC BLOOD PRESSURE: 71 MMHG | BODY MASS INDEX: 27.6 KG/M2 | WEIGHT: 171 LBS | OXYGEN SATURATION: 98 % | SYSTOLIC BLOOD PRESSURE: 121 MMHG

## 2024-12-31 DIAGNOSIS — F41.1 GAD (GENERALIZED ANXIETY DISORDER): Primary | ICD-10-CM

## 2024-12-31 DIAGNOSIS — G25.81 RLS (RESTLESS LEGS SYNDROME): ICD-10-CM

## 2024-12-31 DIAGNOSIS — F33.0 MILD EPISODE OF RECURRENT MAJOR DEPRESSIVE DISORDER (H): ICD-10-CM

## 2024-12-31 DIAGNOSIS — M19.071 OSTEOARTHRITIS OF RIGHT ANKLE AND FOOT: ICD-10-CM

## 2024-12-31 DIAGNOSIS — G47.00 INSOMNIA, UNSPECIFIED TYPE: ICD-10-CM

## 2024-12-31 DIAGNOSIS — G25.81 RESTLESS LEGS SYNDROME (RLS): ICD-10-CM

## 2024-12-31 DIAGNOSIS — R05.9 COUGH, UNSPECIFIED TYPE: ICD-10-CM

## 2024-12-31 PROCEDURE — 99207 PR NO CHARGE LOS: CPT | Mod: 95 | Performed by: PHARMACIST

## 2024-12-31 PROCEDURE — G0463 HOSPITAL OUTPT CLINIC VISIT: HCPCS | Performed by: ANESTHESIOLOGY

## 2024-12-31 PROCEDURE — 99213 OFFICE O/P EST LOW 20 MIN: CPT | Performed by: ANESTHESIOLOGY

## 2024-12-31 PROCEDURE — G2211 COMPLEX E/M VISIT ADD ON: HCPCS | Performed by: ANESTHESIOLOGY

## 2024-12-31 RX ORDER — OXYCODONE HYDROCHLORIDE 5 MG/1
5 TABLET ORAL
Qty: 70 TABLET | Refills: 0 | Status: SHIPPED | OUTPATIENT
Start: 2024-12-31

## 2024-12-31 RX ORDER — ROPINIROLE 2 MG/1
2 TABLET, FILM COATED ORAL 3 TIMES DAILY
Qty: 270 TABLET | Refills: 3 | Status: SHIPPED | OUTPATIENT
Start: 2024-12-31

## 2024-12-31 RX ORDER — BENZONATATE 100 MG/1
100 CAPSULE ORAL 3 TIMES DAILY PRN
Qty: 90 CAPSULE | Refills: 1 | Status: SHIPPED | OUTPATIENT
Start: 2024-12-31

## 2024-12-31 ASSESSMENT — PAIN SCALES - GENERAL: PAINLEVEL_OUTOF10: SEVERE PAIN (7)

## 2024-12-31 NOTE — PROGRESS NOTES
"                          [facility] Follow-up    Date of visit: 12/31/2024    Chief complaint:   Chief Complaint   Patient presents with    Pain    Pain Management       Follow-up for arthralgias, cervical radiculopathy.    Reviewing the record has had preoperative clearing for her shoulder replacement 1/8.    11/22 had a ECT, found to have A-fib.    Had a subsequent ECT 12/13.    Seen today with her .  She reviews having ECT, episode of A-fib which had not happened before.  Wonders if it was attributed to being on Wellbutrin and Abilify, feeling a bit dehydrated.  Has been changed to Viibryd, doses being adjusted.  She wonders is making a big \"manic\", indicates her feet feel restless.  She has a follow-up later this week.    Does note memory issues from the ECT.  Some anxiety.    Reports the medical cannabis, using the flower, very helpful for anxiety and she has got notification to renew her enrollment.    She is scheduled for [facility] orthopedics to have her shoulder surgery 1/8.  Initially was to have her right shoulder placed, though her left is just as bad \"goes out\" and tends to go up the stairs to use the railing with her right hand so they will be doing the left first and then the right.    She also notes with her left hand, it demonstrates that tendon tends to slide off the index finger PIP and she is hoping they will address that as it affects her function.    She continues on the oxycodone 5 mg 5 times a day.  Last urine drug test in May.   reviewed.  We reviewed the surgeons will manage the postoperative pain, she can continue the oxycodone 5 mg 5 times a day unless otherwise instructed by them.  They are welcome to collaborate.    She asked if I refill the Tessalon Perles which she takes concerned that there is a cough.    She also asks for refill of the Requip, taking that 3 times a day has been helpful for sleep.    She notes she has been able to lose some " weight, attributes to stopping soda and diet soda.    Has been using cetyl myristoleate helping to some extent with arthritis in her hands.  Feels the elbow.  Overall was not particularly effective.  Recalls we had discussed last time the fatty acid C-15 which also may be helpful to complement the cetyl myristoleate for inflammation.            Medications:  Current Outpatient Medications   Medication Sig Dispense Refill    acetaminophen (TYLENOL) 325 MG tablet Take 325-650 mg by mouth every 6 hours as needed for mild pain.      albuterol (VENTOLIN HFA) 108 (90 Base) MCG/ACT inhaler Inhale 2 puffs into the lungs every 6 hours as needed for shortness of breath, wheezing or cough 18 g 11    allopurinol (ZYLOPRIM) 300 MG tablet Take 1 tablet (300 mg) by mouth as needed.      benzonatate (TESSALON) 100 MG capsule Take 1 capsule (100 mg) by mouth 3 times daily as needed for cough. 90 capsule 1    Fluticasone-Umeclidin-Vilanterol (TRELEGY ELLIPTA) 200-62.5-25 MCG/ACT oral inhaler Inhale 1 puff into the lungs daily. 60 each 11    gabapentin (NEURONTIN) 100 MG capsule Take 1-3 capsules (100-300 mg) by mouth daily as needed for other (anxiety). May also take 1 to 2 capsules (100-200mg) at bedtime in addition to a 400mg capsule. 150 capsule 0    gabapentin (NEURONTIN) 400 MG capsule Take 400 mg by mouth at bedtime. Along with 3 - 100 mg tablets      guaiFENesin (MUCINEX) 600 MG 12 hr tablet Take 600 mg by mouth every morning.      ketoconazole (NIZORAL) 2 % external shampoo Apply topically daily as needed.      KLOR-CON M20 20 MEQ CR tablet Take 1 tablet (20 mEq) by mouth every morning. 90 tablet 3    MAGNESIUM PO Take 2 capsules by mouth 2 times daily. Strength unknown      medical cannabis (Patient's own supply) See Admin Instructions (The purpose of this order is to document that the patient reports taking medical cannabis.  This is not a prescription, and is not used to certify that the patient has a qualifying medical  condition.)  Flower      melatonin 1 MG CAPS Take 1 mg by mouth at bedtime.      naloxone (NARCAN) 4 MG/0.1ML nasal spray Spray 1 spray (4 mg) into one nostril alternating nostrils as needed for opioid reversal every 2-3 minutes until assistance arrives 0.2 mL 0    omeprazole (PRILOSEC) 20 MG DR capsule Take 1 capsule (20 mg) by mouth as needed.      oxyCODONE (ROXICODONE) 5 MG tablet Take 1 tablet (5 mg) by mouth 5 times daily. May dispense 1/7 for 1/9 70 tablet 0    rOPINIRole (REQUIP) 2 MG tablet Take 1 tablet (2 mg) by mouth 3 times daily. 270 tablet 3    STATIN NOT PRESCRIBED (INTENTIONAL) Please choose reason not prescribed from choices below.      SYNTHROID 150 MCG tablet Take 1 tablet (150 mcg) by mouth every morning. MON to SAT 1 tablet/day; SUN 0.5 tablet - confirmed dosing with patient 11/22      traZODone (DESYREL) 50 MG tablet Take 1-2 tablets ( mg) by mouth nightly as needed for sleep. 60 tablet 1    UNABLE TO FIND Take 1 tablet by mouth every morning. MEDICATION NAME: CETYL-MYRISFOLEATE      vilazodone (VIIBRYD) 20 MG TABS tablet Take 1 tablet (20 mg) by mouth daily with food. 30 tablet 1    vitamin C (ASCORBIC ACID) 1000 MG TABS Take 1,000 mg by mouth every morning.      vitamin D3 (CHOLECALCIFEROL) 250 mcg (07768 units) capsule Take 1 capsule by mouth once a week. SUNDAY'S      Cyanocobalamin (VITAMIN B-12) 5000 MCG SUBL Place 2-3 sprays under the tongue every morning. Unknown dose. 2 or 3 sprays/day (Patient not taking: Reported on 12/31/2024)             Physical Exam:  Blood pressure 121/71, pulse 70, weight 77.6 kg (171 lb), SpO2 98%, not currently breastfeeding.      Alert, clear sensorium.  Does search for some items reflecting short-term memory.  Speech is not pressured.  Affect is fairly wide range appearing less anxious and more smiles than at other times seen.  Ambulates with cane.    Osteoarthritis of hands noted.        Assessment:   Arthralgias, history of cervical surgery with  radiculopathy, presently anticipating surgery for her shoulder and then possibly some point the other with the reverse replacement.    Comorbid mood disorder actively working with the [place] [facility], and with psychiatric providers.    Plan: Will continue with the oxycodone 5 mg 5 times a day in 2-week intervals.    She will be reenrolled in the [place] medical cannabis program.    Supplements as above.  Will follow-up with me in 3 months.  Total time 28 minutes.The longitudinal plan of care for the diagnosis(es)/condition(s) as documented were addressed during this visit. Due to the added complexity in care, I will continue to support Winnie in the subsequent management and with ongoing continuity of care.       minutes spent on the date of encounter doing chart review, history, and exam documentation and further activities as noted above.     Dusty Dubois MD  [facility]                              [facility] Follow-up    Date of visit: 12/31/2024    Chief complaint:   Chief Complaint   Patient presents with    Pain    Pain Management       Interval history:                Medications:  Current Outpatient Medications   Medication Sig Dispense Refill    acetaminophen (TYLENOL) 325 MG tablet Take 325-650 mg by mouth every 6 hours as needed for mild pain.      albuterol (VENTOLIN HFA) 108 (90 Base) MCG/ACT inhaler Inhale 2 puffs into the lungs every 6 hours as needed for shortness of breath, wheezing or cough 18 g 11    allopurinol (ZYLOPRIM) 300 MG tablet Take 1 tablet (300 mg) by mouth as needed.      benzonatate (TESSALON) 100 MG capsule Take 1 capsule (100 mg) by mouth 3 times daily as needed for cough. 90 capsule 1    Fluticasone-Umeclidin-Vilanterol (TRELEGY ELLIPTA) 200-62.5-25 MCG/ACT oral inhaler Inhale 1 puff into the lungs daily. 60 each 11    gabapentin (NEURONTIN) 100 MG capsule Take 1-3 capsules (100-300 mg) by mouth daily as needed for other  (anxiety). May also take 1 to 2 capsules (100-200mg) at bedtime in addition to a 400mg capsule. 150 capsule 0    gabapentin (NEURONTIN) 400 MG capsule Take 400 mg by mouth at bedtime. Along with 3 - 100 mg tablets      guaiFENesin (MUCINEX) 600 MG 12 hr tablet Take 600 mg by mouth every morning.      ketoconazole (NIZORAL) 2 % external shampoo Apply topically daily as needed.      KLOR-CON M20 20 MEQ CR tablet Take 1 tablet (20 mEq) by mouth every morning. 90 tablet 3    MAGNESIUM PO Take 2 capsules by mouth 2 times daily. Strength unknown      medical cannabis (Patient's own supply) See Admin Instructions (The purpose of this order is to document that the patient reports taking medical cannabis.  This is not a prescription, and is not used to certify that the patient has a qualifying medical condition.)  Flower      melatonin 1 MG CAPS Take 1 mg by mouth at bedtime.      naloxone (NARCAN) 4 MG/0.1ML nasal spray Spray 1 spray (4 mg) into one nostril alternating nostrils as needed for opioid reversal every 2-3 minutes until assistance arrives 0.2 mL 0    omeprazole (PRILOSEC) 20 MG DR capsule Take 1 capsule (20 mg) by mouth as needed.      oxyCODONE (ROXICODONE) 5 MG tablet Take 1 tablet (5 mg) by mouth 5 times daily. May dispense 1/7 for 1/9 70 tablet 0    rOPINIRole (REQUIP) 2 MG tablet Take 1 tablet (2 mg) by mouth 3 times daily. 270 tablet 3    STATIN NOT PRESCRIBED (INTENTIONAL) Please choose reason not prescribed from choices below.      SYNTHROID 150 MCG tablet Take 1 tablet (150 mcg) by mouth every morning. MON to SAT 1 tablet/day; SUN 0.5 tablet - confirmed dosing with patient 11/22      traZODone (DESYREL) 50 MG tablet Take 1-2 tablets ( mg) by mouth nightly as needed for sleep. 60 tablet 1    UNABLE TO FIND Take 1 tablet by mouth every morning. MEDICATION NAME: CETYL-MYRISFOLEATE      vilazodone (VIIBRYD) 20 MG TABS tablet Take 1 tablet (20 mg) by mouth daily with food. 30 tablet 1    vitamin C  (ASCORBIC ACID) 1000 MG TABS Take 1,000 mg by mouth every morning.      vitamin D3 (CHOLECALCIFEROL) 250 mcg (96186 units) capsule Take 1 capsule by mouth once a week. SUNDAY'S      Cyanocobalamin (VITAMIN B-12) 5000 MCG SUBL Place 2-3 sprays under the tongue every morning. Unknown dose. 2 or 3 sprays/day (Patient not taking: Reported on 12/31/2024)             Physical Exam:  Blood pressure 121/71, pulse 70, weight 77.6 kg (171 lb), SpO2 98%, not currently breastfeeding.               minutes spent on the date of encounter doing chart review, history, and exam documentation and further activities as noted above.     Dusty Dubois MD  [facility]

## 2024-12-31 NOTE — PROGRESS NOTES
Patient presents to the clinic today for a visit  with AXEL WIGGINS MD            8/1/2024     8:47 AM 10/18/2024     8:47 AM 12/31/2024     3:33 PM   PEG Score   PEG Total Score 6.67 9.33 6.33       UDS/CSA-8/1/24    Medications-oxycodone     QUESTIONS:    Maryan Temple MA  [facility]

## 2024-12-31 NOTE — PATIENT INSTRUCTIONS
"Recommendations from today's MTM visit:                                                         -consider stopping melatonin. Discuss with Jeannette on Friday    Follow-up: 3-6 months or sooner if needed    It was great speaking with you today.  I value your experience and would be very thankful for your time in providing feedback in our clinic survey. In the next few days, you may receive an email or text message from [employer] with a link to a survey related to your  clinical pharmacist.\"     To schedule another MTM appointment, please call the clinic directly or you may call the MTM scheduling line at [phone] or toll-free at [phone].     My Clinical Pharmacist's contact information:                                                      Please feel free to contact me with any questions or concerns you have.      Felicia Hicks, PharmD  Medication Therapy Management Pharmacist  [facility]    "

## 2024-12-31 NOTE — PATIENT INSTRUCTIONS
"[facility] Number:  [phone]  Call with any questions about your care and for scheduling assistance.   Calls are returned Monday through Friday between 8 AM and 4:30 PM. We usually get back to you within 2 business days depending on the issue/request.    If we are prescribing your medications:  For opioid medication refills, call the clinic or send a [employer] message 7 days in advance.  Please include:  Name of requested medication  Name of the pharmacy.  For non-opioid medications, call your pharmacy directly to request a refill. Please allow 3-4 days to be processed.   Per [place] State Law:  All controlled substance prescriptions must be filled within 30 days of being written.    For those controlled substances allowing refills, pickup must occur within 30 days of last fill.      We believe regular attendance is key to your success in our program!    Any time you are unable to keep your appointment we ask that you call us at least 24 hours in advance to cancel.This will allow us to offer the appointment time to another patient.   Multiple missed appointments may lead to dismissal from the clinic.     PLAN:    Discussed the supplement \"C-15\" to take with the cetyl myristoleate to help with joint inflammation.  May obtain online through \"fatty 15\".    You are scheduled for shoulder surgery 1/8.  Orthopedics will manage the postoperative pain.  They may collaborate with Dr. Dubois.    Continue the oxycodone 5 mg 5 times a day as needed.    You will be reenrolled in the [place] medical cannabis program.    Follow-up with Dr. Dubois for return visit in 3 months  "

## 2024-12-31 NOTE — Clinical Note
We discussed her daytime fatigue could be related to multiple things. Could consider stopping melatonin, as unsure if it is providing much benefit.

## 2025-01-02 ENCOUNTER — VIRTUAL VISIT (OUTPATIENT)
Dept: PSYCHOLOGY | Facility: CLINIC | Age: 70
End: 2025-01-02
Payer: MEDICARE

## 2025-01-02 DIAGNOSIS — F33.2 MAJOR DEPRESSIVE DISORDER, RECURRENT SEVERE WITHOUT PSYCHOTIC FEATURES (H): ICD-10-CM

## 2025-01-02 DIAGNOSIS — F41.1 GENERALIZED ANXIETY DISORDER: Primary | ICD-10-CM

## 2025-01-02 ASSESSMENT — PATIENT HEALTH QUESTIONNAIRE - PHQ9
SUM OF ALL RESPONSES TO PHQ QUESTIONS 1-9: 24
10. IF YOU CHECKED OFF ANY PROBLEMS, HOW DIFFICULT HAVE THESE PROBLEMS MADE IT FOR YOU TO DO YOUR WORK, TAKE CARE OF THINGS AT HOME, OR GET ALONG WITH OTHER PEOPLE: EXTREMELY DIFFICULT
SUM OF ALL RESPONSES TO PHQ QUESTIONS 1-9: 24

## 2025-01-02 NOTE — PROGRESS NOTES
"[facility]                                     Progress Note    Patient Name: Randi Cleary  Date: 25       Service Type: Individual      Session Start Time: 2:30  Session End Time: 8:26     Session Length: 53+    Session #: 14    Attendees: Client    Service Modality:  Video Visit:      Provider verified identity through the following two step process.  Patient provided:  Patient  and Patient address    Telemedicine Visit: The patient's condition can be safely assessed and treated via synchronous audio and visual telemedicine encounter.      Reason for Telemedicine Visit: Patient has requested telehealth visit    Originating Site (Patient Location): Patient's home    Distant Site (Provider Location): Provider Remote Setting- Home Office    Consent:  The patient/guardian has verbally consented to: the potential risks and benefits of telemedicine (video visit) versus in person care; bill my insurance or make self-payment for services provided; and responsibility for payment of non-covered services.     Patient would like the video invitation sent by:  My Chart    Mode of Communication:  Video Conference via Amwell    Distant Location (Provider):  Off-site    As the provider I attest to compliance with applicable laws and regulations related to telemedicine.    DATA  Interactive Complexity: No  Crisis: No        Progress Since Last Session (Related to Symptoms / Goals / Homework):   Symptoms: Worsening      Homework:  n/a      Episode of Care Goals: Satisfactory progress - ACTION (Actively working towards change); Intervened by reinforcing change plan / affirming steps taken     Current / Ongoing Stressors and Concerns:   Patient is tearful.  Reports she fell after trying to get out of bed this morning.  Reports she blacked out after hitting her head on the hard floor.  Reports headache and feeling dizzy since the fall.  She is lethargic and tearful throughout meeting.  She reports \"I " "might as well commit suicide.\"  States \"I don't fit in this world\" and \"I don't want to be here anymore.\"    Therapist contacted 911 during appointment as patient was home alone and both  and friend were unable to come to the house to help patient.     Treatment Objective(s) Addressed in This Session:   Distress tolerance     Intervention:   Validation, safety assessment and planning.      Assessments completed prior to visit:  The following assessments were completed by patient for this visit:  PHQ9:       11/21/2024     8:31 AM 12/3/2024     9:10 AM 12/5/2024     2:43 PM 12/10/2024     9:24 AM 12/17/2024     9:11 AM 12/23/2024     7:30 AM 1/2/2025     2:05 PM   PHQ-9 SCORE   PHQ-9 Total Score MyChart 16 (Moderately severe depression) 17 (Moderately severe depression) 17 (Moderately severe depression) 15 (Moderately severe depression) 15 (Moderately severe depression) 0 24 (Severe depression)   PHQ-9 Total Score 16  17  17  15  15  15 24        Patient-reported     GAD7:       9/9/2024     1:33 PM 9/24/2024     9:54 AM 10/8/2024     6:29 AM 10/22/2024     8:59 AM 11/4/2024     2:38 PM 12/3/2024     9:10 AM 12/17/2024     9:13 AM   CHAVO-7 SCORE   Total Score 13 (moderate anxiety) 9 (mild anxiety) 9 (mild anxiety) 11 (moderate anxiety) 10 (moderate anxiety) 17 (severe anxiety) 13 (moderate anxiety)   Total Score 13 9 9    9 11    11 10  17  13        Patient-reported     CAGE-AID:       6/22/2020     7:12 PM 1/18/2022    11:15 PM 6/6/2024     8:00 AM   CAGE-AID Total Score   Total Score 4 4 4   Total Score MyChart 4 (A total score of 2 or greater is considered clinically significant) 4 (A total score of 2 or greater is considered clinically significant)      PROMIS 10-Global Health (all questions and answers displayed):       10/30/2024     8:48 AM 11/6/2024    12:54 PM 11/14/2024    11:35 AM 11/26/2024     5:00 PM 12/4/2024     9:28 AM 12/18/2024     9:10 AM 12/30/2024     5:44 PM   PROMIS 10   In general, " would you say your health is: Fair Fair Fair  Good Fair Fair   In general, would you say your quality of life is: Poor Poor Poor  Poor Fair Poor   In general, how would you rate your physical health? Poor Good Fair  Good Fair Fair   In general, how would you rate your mental health, including your mood and your ability to think? Fair Fair Fair  Fair Fair Poor   In general, how would you rate your satisfaction with your social activities and relationships? Fair Poor Poor  Poor Poor Poor   In general, please rate how well you carry out your usual social activities and roles Poor Poor Fair  Poor Poor Poor   To what extent are you able to carry out your everyday physical activities such as walking, climbing stairs, carrying groceries, or moving a chair? Moderately Moderately Moderately  Moderately Moderately Moderately   In the past 7 days, how often have you been bothered by emotional problems such as feeling anxious, depressed, or irritable? Often Often Often  Often Often Often   In the past 7 days, how would you rate your fatigue on average? Moderate Moderate Moderate  Moderate Moderate Moderate   In the past 7 days, how would you rate your pain on average, where 0 means no pain, and 10 means worst imaginable pain? 7 7 7  8 7 8   In general, would you say your health is: 2 2 2  3 2 2   In general, would you say your quality of life is: 1 1 1  1 2 1   In general, how would you rate your physical health? 1 3 2  3 2 2   In general, how would you rate your mental health, including your mood and your ability to think? 2 2 2  2 2 1   In general, how would you rate your satisfaction with your social activities and relationships? 2 1 1  1 1 1   In general, please rate how well you carry out your usual social activities and roles. (This includes activities at home, at work and in your community, and responsibilities as a parent, child, spouse, employee, friend, etc.) 1 1 2  1 1 1   To what extent are you able to carry out  your everyday physical activities such as walking, climbing stairs, carrying groceries, or moving a chair? 3 3 3  3 3 3   In the past 7 days, how often have you been bothered by emotional problems such as feeling anxious, depressed, or irritable? 4 4 4  4 4 4   In the past 7 days, how would you rate your fatigue on average? 3 3 3  3 3 3   In the past 7 days, how would you rate your pain on average, where 0 means no pain, and 10 means worst imaginable pain? 7 7 7  8 7 8   Global Mental Health Score 7  6  6   6  7  5    Global Physical Health Score 9  11  10   11  10  10    PROMIS TOTAL - SUBSCORES 16  17  16   17  17  15        Information is confidential and restricted. Go to Review Flowsheets to unlock data.    Patient-reported     [place] Suicide Severity Rating Scale (Lifetime/Recent)      6/11/2020    10:00 AM 1/9/2023     1:00 PM 5/21/2024     4:49 PM 5/21/2024     9:00 PM 6/6/2024     8:00 AM 7/10/2024    12:00 PM 11/22/2024    11:05 AM   [place] Suicide Severity Rating (Lifetime/Recent)   Q1 Wish to be Dead (Lifetime) Yes   Yes      Comments when patient was in treatment and there were family concerns   OD on medications      Q2 Non-Specific Active Suicidal Thoughts (Lifetime) Yes   No      Most Severe Ideation Rating (Lifetime) 5   5      Most Severe Ideation Description (Lifetime) when family problems were happening   OD on medication      Frequency (Lifetime) --   3      Duration (Lifetime) --   3      Controllability (Lifetime) 0   2      Protective Factors  (Lifetime) 5   4      Reasons for Ideation (Lifetime) --   5      Q1 Wished to be Dead (Past Month)  yes 1-->yes 1-->yes 1-->yes  0-->no   Q2 Suicidal Thoughts (Past Month)  no 1-->yes 1-->yes 1-->yes  0-->no   Q3 Suicidal Thought Method  no 0-->no 0-->no 1-->yes     Q4 Suicidal Intent without Specific Plan  no 1-->yes 0-->no 0-->no     Q5 Suicide Intent with Specific Plan  no 0-->no 0-->no 1-->yes     Q6 Suicide Behavior (Lifetime)  yes 1-->yes  0-->no 1-->yes  0-->no   If yes to Q6, within past 3 months?  no 1-->yes 0-->no 0-->no     Level of Risk per Screen  moderate risk high risk moderate risk high risk  no risks indicated   RETIRED: 1. Wish to be Dead (Recent) No         RETIRED: 2. Non-Specific Active Suicidal Thoughts (Recent) No         3. Active Suicidal Ideation with any Methods (Not Plan) Without Intent to Act (Lifetime) Yes         RETIRE: Active Suicidal Ideation with any Methods (Not Plan) Description (Lifetime) 30 years prior         RETIRE: 4. Active Suicidal Ideation with Some Intent to Act, Without Specific Plan (Lifetime) Yes         RETIRE: 5. Active Suicidal Ideation with Specific Plan and Intent (Lifetime) Yes         Actual Attempt (Lifetime) Yes         Actual Attempt Description (Lifetime) patient reported overdosing on Prozac about thirty years ago         Total Number of Actual Attempts (Lifetime) 1         Actual Attempt (Past 3 Months) No         Has subject engaged in non-suicidal self-injurious behavior? (Lifetime) Yes         Has subject engaged in non-suicidal self-injurious behavior? (Past 3 Months) No         Interrupted Attempts (Lifetime) No         Interrupted Attempts (Past 3 Months) No         Aborted or Self-Interrupted Attempt (Lifetime) No         Aborted or Self-Interrupted Attempt (Past 3 Months) No         Preparatory Acts or Behavior (Lifetime) No         Preparatory Acts or Behavior (Past 3 Months) No         Most Recent Attempt Date --         Comments 30 years prior         Most Recent Attempt Actual Lethality Code 0         Q1 Wish to be Dead (Lifetime)      N    Q2 Non-Specific Active Suicidal Thoughts (Lifetime)      N          ASSESSMENT: Current Emotional / Mental Status (status of significant symptoms):   Risk status (Self / Other harm or suicidal ideation)   Patient denies current fears or concerns for personal safety.   Patient reports the following current or recent suicidal ideation or behaviors:  "she reports \"I might as well just commit suicide.\" Patient states she does not want to live anymore, \"no one wants me,\" \"my cat doesn't love me anyway,\" and \"my whole life has been taken away.\".   Patient denies current or recent homicidal ideation or behaviors.   Patient denies current or recent self injurious behavior or ideation.   Patient denies other safety concerns.   Patient reports there has been no change in risk factors since their last session.     Patient reports there has been no change in protective factors since their last session.     A safety and risk management plan has been developed including: Patient consented to co-developed safety plan on 6/4/24.  Safety and risk management plan was reviewed.   Patient agreed to use safety plan should any safety concerns arise.  A copy was made available to the patient.     Appearance:   Disheveled    Eye Contact:   Poor   Psychomotor Behavior: Retarded (Slowed)    Attitude:   Indifferent   Orientation:   All   Speech    Rate / Production: Slow     Volume:  Normal    Mood:    Depressed  Sad    Affect:    Subdued  Tearful   Thought Content:  Suicidal   Thought Form:  Tangential    Insight:    Fair      Medication Review:   No changes to current psychiatric medication(s)     Medication Compliance:   Yes     Changes in Health Issues:   None reported     Chemical Use Review:   Substance Use: Chemical use reviewed, no active concerns identified      Tobacco Use: No current tobacco use.      Diagnosis:  1. Generalized anxiety disorder    2. Major depressive disorder, recurrent severe without psychotic features (H)        Collateral Reports Completed:   Not Applicable    PLAN: (Patient Tasks / Therapist Tasks / Other)  [place] police and EMS assessed patient in her home.  They will transport her to [facility] in [place].  Therapist contacted patient's  to notify him of her transport to [place].      EWELINA Billingsley                 "                                         ______________________________________________________________________    Individual Treatment Plan    Patient's Name: Randi Cleary  YOB: 1955    Date of Creation: 7/17/2024  Date Treatment Plan Last Reviewed/Revised: 7/17/2024, 10/30/24     DSM5 Diagnoses:   296.33 (F33.2) Major Depressive Disorder, Recurrent Episode, Severe   300.02 (F41.1) Generalized Anxiety Disorder  Psychosocial / Contextual Factors: history of trauma  PROMIS (reviewed every 90 days):     Referral / Collaboration:  Discussed and patient will pursue a therapy group for depressive symptoms.    Anticipated number of session for this episode of care: 9-12 sessions  Anticipation frequency of session: Weekly  Anticipated Duration of each session: 38-52 minutes  Treatment plan will be reviewed in 90 days or when goals have been changed.       MeasurableTreatment Goal(s) related to diagnosis / functional impairment(s)  Goal 1: Client will keep self safe and eliminate suicidal ideation and self-harm behaviors.    Objective #A (Client Action)    Client will make a list of at least 10 skills or activities that you will to use to distract from urges to harm self.  Status: Completed - Date: 10/30/24       Intervention(s)  Therapist will provide ideas and strategies for generating coping skills list.    Objective #B  Client will make a list of pros and cons for tolerating and not tolerating an urge to harm self.  Status: Continued - Date(s):10/30/24      Intervention(s)  Therapist will guide client through DBT-style Pros/Cons list for behavior change.    Objective #C  Client will identify and practice the new strategies for dealing with strong emotions, learn and practice relaxation breathing.  Status: Continued - Date(s):10/30/24      Intervention(s)  Therapist will teach distraction skills. Practice them within session and outside of session.    Goal 2: Client will report reduction in anxiety  also as evidenced by reduction of CHAVO-7 score below 6 points within the next 12 weeks.    Objective #A (Client Action)    Client will identify at least three triggers for anxiety.  Status: Completed - Date: 10/30/24       Intervention(s)  Therapist will provide educational materials on common triggers and signs of anxiety.    Objective #B  Client will use relaxation strategies at least two times per day to reduce the physical symptoms of anxiety.  Status: Continued - Date(s):10/30/24      Intervention(s)  Therapist will teach relaxation strategies such as mindfulness, deep breathing, muscle relaxation, and sensory activities.    Objective #C  Client will use cognitive strategies identified in therapy to challenge anxious thoughts.  Status: Continued - Date(s):10/30/24      Intervention(s)  Therapist will teach strategies for cognitive modification using REBT model.    Goal 3: Client will report improved mood as indicated by PHQ-9 score below 6 for consistent 8 weeks.    Objective #A (Client Action)    Status: Continued - Date: 10/30/24     Client will Increase interest, engagement, and pleasure in doing things.    Intervention(s)  Therapist will assign homework for daily involvement in pleasant activities.    Objective #B  Client will Identify negative self-talk and behaviors: challenge core beliefs, myths, and actions.    Status: Continued - Date(s):10/30/24      Intervention(s)  Therapist will teach strategies for cognitive modification using REBT models.    Objective #C  Client will Improve quantity and quality of night time sleep / decrease daytime naps.  Status: Continued - Date(s):10/30/24      Intervention(s)  Therapist will teach sleep hygiene strategies.  Assign homework for daily practice.    Goal 4: Client will report reduced or eliminated physiological activation when recalling a distressing event including decreased re-experiencing, decreased avoidance, and decreased hyperarousal.    Objective #A  (Client Action)    Status:  Continued: 10/30/24       Client will acquire knowledge of trauma, common symptoms post-trauma, emotion regulation strategies, stress management strategies, and cognitive coping strategies.    Intervention(s)  Therapist will teach about effects of trauma on mind and body; encourage emotion identification and expression; practice with client the stress management strategies; and teach cognitive modification.    Objective #B  Client will use Brainspotting while focusing on physiological sensations related to distressing events.  Client will assess and rate level of physiological activation.  Status: Continued - Date(s):10/30/24     Intervention(s)  Therapist will provide use a pointer or other materials to assist client in holding a brainspot in their visual field.  Therapist might also provide biolateral music through headphones to deepen the experience.         Patient has reviewed and agreed to the above plan.      Erika Colorado, LMFT  July 17, 2024

## 2025-01-06 ENCOUNTER — TELEPHONE (OUTPATIENT)
Dept: PSYCHIATRY | Facility: CLINIC | Age: 70
End: 2025-01-06
Payer: MEDICARE

## 2025-01-06 ENCOUNTER — TELEPHONE (OUTPATIENT)
Dept: PSYCHIATRY | Facility: CLINIC | Age: 70
End: 2025-01-06

## 2025-01-06 ENCOUNTER — TELEPHONE (OUTPATIENT)
Dept: INTERNAL MEDICINE | Facility: CLINIC | Age: 70
End: 2025-01-06
Payer: MEDICARE

## 2025-01-06 DIAGNOSIS — F33.2 SEVERE EPISODE OF RECURRENT MAJOR DEPRESSIVE DISORDER, WITHOUT PSYCHOTIC FEATURES (H): Chronic | ICD-10-CM

## 2025-01-06 RX ORDER — VILAZODONE HYDROCHLORIDE 10 MG/1
10 TABLET ORAL DAILY
COMMUNITY
Start: 2025-01-06

## 2025-01-06 NOTE — TELEPHONE ENCOUNTER
"Writer received message from scheduling that patient was looking to speak to a nurse. Writer spoke to pt. She stated she fell on the floor last Thursday and went to the ED. Patient states she is under the care of NP Jeannette, who provides psychiatric medication management. Patient also working with Dr. Dubois in the pain clinic. Patient states she wants to come in for ECT. She will need to have medical clearance prior to being able to come in for ECT, since she \"blacked out\" and fell after her recent pre- op physical. Pt made aware and verbalizes understanding. Dr. Cotter and charge  RN aware of conversation with patient.   "

## 2025-01-06 NOTE — TELEPHONE ENCOUNTER
"  General Call    Contacts       Contact Date/Time Type Contact Phone/Fax    01/06/2025 10:37 AM CST Phone (Incoming) Winnie Enamorado (Self) [phone] (M)          Reason for Call:  Patient requesting an appointment with Kaushik Hobbs after going to the ER with \"serotonin syndrome.\"  Patient was instructed to see primary or go to  today for a ER follow up and to have vitals taken. Patient does not want to see any one else except PCP.  Patient was offered Virtual visit. Patient said no because I need vitals taken.   Patient said she has no transportation to the clinic today as her  is at work..   Writer offered other providers, RN visit only for BP check, patient again declined.  Writer asked patient if there is any thing we can do to help to call back.    Patient said she will send a message to Dr. Dubois at the [facility].    Patient said thank you and hung up.    Could we send this information to you in [employer] [facility] or would you prefer to receive a phone call?:   Patient would prefer a phone call   Okay to leave a detailed message?: Yes at Cell number on file:    Telephone Information:   Mobile [phone]     "

## 2025-01-06 NOTE — TELEPHONE ENCOUNTER
I spoke with Winnie and her , Sidney.    Winnie is feeling better today. She's having some restless leg symptoms, lingering cough.    Per , Sidney:  -Winnie had a panic attack while waiting at the ED last Thursday 01/02/25.   -He (and Winnie) feels they were more focused on evaluation of Winnie's mental health versus physical concerns.  -The ED visit was a significant source of stress.  -Winnie has been very angry. She's been throwing things, slamming things, uncontrollable. Doesn't happen all the time but feels this has been getting worse.  -Per Sidney, the best he has ever seen Winnie was when she was hospitalized and receiving ECT treatments x3 days per week.     Per Winnie:  -She doesn't like the way Viibryd makes her feel. She feels that since starting Viibryd, ECT has not been as effective.   -Winnie thinks oxycodone could be contributing to potential DDI. She's been trying to decrease her use.  -She feels like thinks have been spiraling, she feels out of control.   -She denies SI/HI.   -Dizziness has gotten better, some lingering vertigo. Appetite has improved.     Clonus: Denies. Having some increased restless leg symptoms. She takes Ropinirole but hasn't had any issues for a while.   Hallucinations: Denies  Fever: She hasn't checked it. It was 98.4 in the ED on 01/02/25. Winnie says her normal is 97. She has been having some chills today but these have been improving. She does not think she has a fever however has not checked her temperature today.  Mental status: Alert and oriented x4.    Assessment: Unclear whether serotonin syndrome responsible for recent symptoms. She had been taking trazodone 50mg more consistently prior to her fall last week and this was in the context of Viibryd 20mg daily and oxycodone 5mg five times daily. She was evaluated in the ED on 01/02/25 and vitals/work-up were unremarkable. Trazodone was discontinued on 01/03/25. Her  has recently been sick with the flu and viral syndrome could  also be at play. COVID and influenza testing were were negative on 01/02/25. EKG was WNL on 01/02/25.    Winnie is apprehensive about antidepressant use and has been since we first met. We are going to discontinue Viibryd and re-assess on Friday. She is feeling better today - appetite is improved, coughing less frequently, and she does not think she has a fever although has not checked this recently. She was not evaluated at urgent care or PCP today as recommended; she is planning to go to urgent care tomorrow. Overall she presents much less labile during today's visit. No tearfulness or anger.     Plan:  -Decrease Viibryd to 10mg once daily for 7 days and then discontinue.   -She does not want to start mirtazapine as previously discussed. She is concerned about weight gain.   -Continue gabapentin 200mg at bedtime.   -Continue melatonin 3mg nightly.   -She is planning to have her vitals checked tomorrow at urgent care.   -She is scheduled with PCP 01/15/25.   -I will call Winnie on Friday for another check-in.

## 2025-01-08 ENCOUNTER — VIRTUAL VISIT (OUTPATIENT)
Dept: PSYCHOLOGY | Facility: CLINIC | Age: 70
End: 2025-01-08
Payer: MEDICARE

## 2025-01-08 ENCOUNTER — ALLIED HEALTH/NURSE VISIT (OUTPATIENT)
Dept: FAMILY MEDICINE | Facility: CLINIC | Age: 70
End: 2025-01-08
Payer: MEDICARE

## 2025-01-08 ENCOUNTER — TELEPHONE (OUTPATIENT)
Dept: INTERNAL MEDICINE | Facility: CLINIC | Age: 70
End: 2025-01-08

## 2025-01-08 ENCOUNTER — PATIENT OUTREACH (OUTPATIENT)
Dept: CARE COORDINATION | Facility: CLINIC | Age: 70
End: 2025-01-08

## 2025-01-08 VITALS
SYSTOLIC BLOOD PRESSURE: 116 MMHG | DIASTOLIC BLOOD PRESSURE: 71 MMHG | RESPIRATION RATE: 16 BRPM | HEART RATE: 89 BPM | TEMPERATURE: 98.5 F | WEIGHT: 168.6 LBS | BODY MASS INDEX: 27.21 KG/M2 | OXYGEN SATURATION: 97 %

## 2025-01-08 DIAGNOSIS — F33.2 MAJOR DEPRESSIVE DISORDER, RECURRENT SEVERE WITHOUT PSYCHOTIC FEATURES (H): Primary | ICD-10-CM

## 2025-01-08 DIAGNOSIS — F41.1 GENERALIZED ANXIETY DISORDER: ICD-10-CM

## 2025-01-08 PROCEDURE — 90837 PSYTX W PT 60 MINUTES: CPT | Mod: 95 | Performed by: MARRIAGE & FAMILY THERAPIST

## 2025-01-08 ASSESSMENT — PAIN SCALES - GENERAL: PAINLEVEL_OUTOF10: NO PAIN (0)

## 2025-01-08 NOTE — PROGRESS NOTES
[facility]                                     Progress Note    Patient Name: Randi Cleary  Date: 25       Service Type: Individual      Session Start Time: 2:30  Session End Time: 3:25     Session Length: 53+    Session #: 15    Attendees: Client    Service Modality:  Video Visit:      Provider verified identity through the following two step process.  Patient provided:  Patient  and Patient address    Telemedicine Visit: The patient's condition can be safely assessed and treated via synchronous audio and visual telemedicine encounter.      Reason for Telemedicine Visit: Patient has requested telehealth visit    Originating Site (Patient Location): Patient's home    Distant Site (Provider Location): Provider Remote Setting- Home Office    Consent:  The patient/guardian has verbally consented to: the potential risks and benefits of telemedicine (video visit) versus in person care; bill my insurance or make self-payment for services provided; and responsibility for payment of non-covered services.     Patient would like the video invitation sent by:  My Chart    Mode of Communication:  Video Conference via Amwell    Distant Location (Provider):  Off-site    As the provider I attest to compliance with applicable laws and regulations related to telemedicine.    DATA  Interactive Complexity: No  Crisis: No        Progress Since Last Session (Related to Symptoms / Goals / Homework):   Symptoms: Improving      Homework:  n/a      Episode of Care Goals: Satisfactory progress - ACTION (Actively working towards change); Intervened by reinforcing change plan / affirming steps taken     Current / Ongoing Stressors and Concerns:   Continued dizziness.  She is talking with psychiatry about symptoms and regularly having vitals checked.  Reports some increased motivation to set boundaries for self with a friend.       Treatment Objective(s) Addressed in This Session:   Distress  tolerance     Intervention:   Validation, safety assessment.  Encouragement.  Small steps toward larger goals      Assessments completed prior to visit:  The following assessments were completed by patient for this visit:  PHQ9:       11/21/2024     8:31 AM 12/3/2024     9:10 AM 12/5/2024     2:43 PM 12/10/2024     9:24 AM 12/17/2024     9:11 AM 12/23/2024     7:30 AM 1/2/2025     2:05 PM   PHQ-9 SCORE   PHQ-9 Total Score MyChart 16 (Moderately severe depression) 17 (Moderately severe depression) 17 (Moderately severe depression) 15 (Moderately severe depression) 15 (Moderately severe depression) 0 24 (Severe depression)   PHQ-9 Total Score 16  17  17  15  15  15 24        Patient-reported     GAD7:       9/9/2024     1:33 PM 9/24/2024     9:54 AM 10/8/2024     6:29 AM 10/22/2024     8:59 AM 11/4/2024     2:38 PM 12/3/2024     9:10 AM 12/17/2024     9:13 AM   CHAVO-7 SCORE   Total Score 13 (moderate anxiety) 9 (mild anxiety) 9 (mild anxiety) 11 (moderate anxiety) 10 (moderate anxiety) 17 (severe anxiety) 13 (moderate anxiety)   Total Score 13 9 9    9 11    11 10  17  13        Patient-reported     CAGE-AID:       6/22/2020     7:12 PM 1/18/2022    11:15 PM 6/6/2024     8:00 AM   CAGE-AID Total Score   Total Score 4 4 4   Total Score MyChart 4 (A total score of 2 or greater is considered clinically significant) 4 (A total score of 2 or greater is considered clinically significant)      PROMIS 10-Global Health (all questions and answers displayed):       10/30/2024     8:48 AM 11/6/2024    12:54 PM 11/14/2024    11:35 AM 11/26/2024     5:00 PM 12/4/2024     9:28 AM 12/18/2024     9:10 AM 12/30/2024     5:44 PM   PROMIS 10   In general, would you say your health is: Fair Fair Fair  Good Fair Fair   In general, would you say your quality of life is: Poor Poor Poor  Poor Fair Poor   In general, how would you rate your physical health? Poor Good Fair  Good Fair Fair   In general, how would you rate your mental health,  including your mood and your ability to think? Fair Fair Fair  Fair Fair Poor   In general, how would you rate your satisfaction with your social activities and relationships? Fair Poor Poor  Poor Poor Poor   In general, please rate how well you carry out your usual social activities and roles Poor Poor Fair  Poor Poor Poor   To what extent are you able to carry out your everyday physical activities such as walking, climbing stairs, carrying groceries, or moving a chair? Moderately Moderately Moderately  Moderately Moderately Moderately   In the past 7 days, how often have you been bothered by emotional problems such as feeling anxious, depressed, or irritable? Often Often Often  Often Often Often   In the past 7 days, how would you rate your fatigue on average? Moderate Moderate Moderate  Moderate Moderate Moderate   In the past 7 days, how would you rate your pain on average, where 0 means no pain, and 10 means worst imaginable pain? 7 7 7  8 7 8   In general, would you say your health is: 2 2 2  3 2 2   In general, would you say your quality of life is: 1 1 1  1 2 1   In general, how would you rate your physical health? 1 3 2  3 2 2   In general, how would you rate your mental health, including your mood and your ability to think? 2 2 2  2 2 1   In general, how would you rate your satisfaction with your social activities and relationships? 2 1 1  1 1 1   In general, please rate how well you carry out your usual social activities and roles. (This includes activities at home, at work and in your community, and responsibilities as a parent, child, spouse, employee, friend, etc.) 1 1 2  1 1 1   To what extent are you able to carry out your everyday physical activities such as walking, climbing stairs, carrying groceries, or moving a chair? 3 3 3  3 3 3   In the past 7 days, how often have you been bothered by emotional problems such as feeling anxious, depressed, or irritable? 4 4 4  4 4 4   In the past 7 days, how  would you rate your fatigue on average? 3 3 3  3 3 3   In the past 7 days, how would you rate your pain on average, where 0 means no pain, and 10 means worst imaginable pain? 7 7 7  8 7 8   Global Mental Health Score 7  6  6   6  7  5    Global Physical Health Score 9  11  10   11  10  10    PROMIS TOTAL - SUBSCORES 16  17  16   17  17  15        Information is confidential and restricted. Go to Review Flowsheets to unlock data.    Patient-reported     [place] Suicide Severity Rating Scale (Lifetime/Recent)      6/11/2020    10:00 AM 1/9/2023     1:00 PM 5/21/2024     4:49 PM 5/21/2024     9:00 PM 6/6/2024     8:00 AM 7/10/2024    12:00 PM 11/22/2024    11:05 AM   [place] Suicide Severity Rating (Lifetime/Recent)   Q1 Wish to be Dead (Lifetime) Yes   Yes      Comments when patient was in treatment and there were family concerns   OD on medications      Q2 Non-Specific Active Suicidal Thoughts (Lifetime) Yes   No      Most Severe Ideation Rating (Lifetime) 5   5      Most Severe Ideation Description (Lifetime) when family problems were happening   OD on medication      Frequency (Lifetime) --   3      Duration (Lifetime) --   3      Controllability (Lifetime) 0   2      Protective Factors  (Lifetime) 5   4      Reasons for Ideation (Lifetime) --   5      Q1 Wished to be Dead (Past Month)  yes 1-->yes 1-->yes 1-->yes  0-->no   Q2 Suicidal Thoughts (Past Month)  no 1-->yes 1-->yes 1-->yes  0-->no   Q3 Suicidal Thought Method  no 0-->no 0-->no 1-->yes     Q4 Suicidal Intent without Specific Plan  no 1-->yes 0-->no 0-->no     Q5 Suicide Intent with Specific Plan  no 0-->no 0-->no 1-->yes     Q6 Suicide Behavior (Lifetime)  yes 1-->yes 0-->no 1-->yes  0-->no   If yes to Q6, within past 3 months?  no 1-->yes 0-->no 0-->no     Level of Risk per Screen  moderate risk high risk moderate risk high risk  no risks indicated   RETIRED: 1. Wish to be Dead (Recent) No         RETIRED: 2. Non-Specific Active Suicidal Thoughts  "(Recent) No         3. Active Suicidal Ideation with any Methods (Not Plan) Without Intent to Act (Lifetime) Yes         RETIRE: Active Suicidal Ideation with any Methods (Not Plan) Description (Lifetime) 30 years prior         RETIRE: 4. Active Suicidal Ideation with Some Intent to Act, Without Specific Plan (Lifetime) Yes         RETIRE: 5. Active Suicidal Ideation with Specific Plan and Intent (Lifetime) Yes         Actual Attempt (Lifetime) Yes         Actual Attempt Description (Lifetime) patient reported overdosing on Prozac about thirty years ago         Total Number of Actual Attempts (Lifetime) 1         Actual Attempt (Past 3 Months) No         Has subject engaged in non-suicidal self-injurious behavior? (Lifetime) Yes         Has subject engaged in non-suicidal self-injurious behavior? (Past 3 Months) No         Interrupted Attempts (Lifetime) No         Interrupted Attempts (Past 3 Months) No         Aborted or Self-Interrupted Attempt (Lifetime) No         Aborted or Self-Interrupted Attempt (Past 3 Months) No         Preparatory Acts or Behavior (Lifetime) No         Preparatory Acts or Behavior (Past 3 Months) No         Most Recent Attempt Date --         Comments 30 years prior         Most Recent Attempt Actual Lethality Code 0         Q1 Wish to be Dead (Lifetime)      N    Q2 Non-Specific Active Suicidal Thoughts (Lifetime)      N          ASSESSMENT: Current Emotional / Mental Status (status of significant symptoms):   Risk status (Self / Other harm or suicidal ideation)   Patient denies current fears or concerns for personal safety.   Patient reports the following current or recent suicidal ideation or behaviors: she reports \"I might as well just commit suicide.\" Patient states she does not want to live anymore, \"no one wants me,\" \"my cat doesn't love me anyway,\" and \"my whole life has been taken away.\".   Patient denies current or recent homicidal ideation or behaviors.   Patient denies " current or recent self injurious behavior or ideation.   Patient denies other safety concerns.   Patient reports there has been no change in risk factors since their last session.     Patient reports there has been no change in protective factors since their last session.     A safety and risk management plan has been developed including: Patient consented to co-developed safety plan on 6/4/24.  Safety and risk management plan was reviewed.   Patient agreed to use safety plan should any safety concerns arise.  A copy was made available to the patient.     Appearance:   Disheveled    Eye Contact:   Poor   Psychomotor Behavior: Retarded (Slowed)    Attitude:   Indifferent   Orientation:   All   Speech    Rate / Production: Slow     Volume:  Normal    Mood:    Depressed  Sad    Affect:    Subdued  Tearful   Thought Content:  Suicidal   Thought Form:  Tangential    Insight:    Fair      Medication Review:   No changes to current psychiatric medication(s)     Medication Compliance:   Yes     Changes in Health Issues:   None reported     Chemical Use Review:   Substance Use: Chemical use reviewed, no active concerns identified      Tobacco Use: No current tobacco use.      Diagnosis:  1. 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 Generalized anxiety disorder          Collateral Reports Completed:   Not Applicable    PLAN: (Patient Tasks / Therapist Tasks / Other)  Continue following safety plan.  Call [facility] for [place] Choices assessment.      Erika Colorado, XOCHITLFT                                                         ______________________________________________________________________    Individual Treatment Plan    Patient's Name: Randi Cleary  YOB: 1955    Date of Creation: 7/17/2024  Date Treatment Plan Last Reviewed/Revised: 7/17/2024, 10/30/24     DSM5 Diagnoses:   296.33 (F33.2) Major Depressive Disorder, Recurrent Episode, Severe   300.02  (F41.1) Generalized Anxiety Disorder  Psychosocial / Contextual Factors: history of trauma  PROMIS (reviewed every 90 days):     Referral / Collaboration:  Discussed and patient will pursue a therapy group for depressive symptoms.    Anticipated number of session for this episode of care: 9-12 sessions  Anticipation frequency of session: Weekly  Anticipated Duration of each session: 38-52 minutes  Treatment plan will be reviewed in 90 days or when goals have been changed.       MeasurableTreatment Goal(s) related to diagnosis / functional impairment(s)  Goal 1: Client will keep self safe and eliminate suicidal ideation and self-harm behaviors.    Objective #A (Client Action)    Client will make a list of at least 10 skills or activities that you will to use to distract from urges to harm self.  Status: Completed - Date: 10/30/24       Intervention(s)  Therapist will provide ideas and strategies for generating coping skills list.    Objective #B  Client will make a list of pros and cons for tolerating and not tolerating an urge to harm self.  Status: Continued - Date(s):10/30/24      Intervention(s)  Therapist will guide client through DBT-style Pros/Cons list for behavior change.    Objective #C  Client will identify and practice the new strategies for dealing with strong emotions, learn and practice relaxation breathing.  Status: Continued - Date(s):10/30/24      Intervention(s)  Therapist will teach distraction skills. Practice them within session and outside of session.    Goal 2: Client will report reduction in anxiety also as evidenced by reduction of CHAVO-7 score below 6 points within the next 12 weeks.    Objective #A (Client Action)    Client will identify at least three triggers for anxiety.  Status: Completed - Date: 10/30/24       Intervention(s)  Therapist will provide educational materials on common triggers and signs of anxiety.    Objective #B  Client will use relaxation strategies at least two times  per day to reduce the physical symptoms of anxiety.  Status: Continued - Date(s):10/30/24      Intervention(s)  Therapist will teach relaxation strategies such as mindfulness, deep breathing, muscle relaxation, and sensory activities.    Objective #C  Client will use cognitive strategies identified in therapy to challenge anxious thoughts.  Status: Continued - Date(s):10/30/24      Intervention(s)  Therapist will teach strategies for cognitive modification using REBT model.    Goal 3: Client will report improved mood as indicated by PHQ-9 score below 6 for consistent 8 weeks.    Objective #A (Client Action)    Status: Continued - Date: 10/30/24     Client will Increase interest, engagement, and pleasure in doing things.    Intervention(s)  Therapist will assign homework for daily involvement in pleasant activities.    Objective #B  Client will Identify negative self-talk and behaviors: challenge core beliefs, myths, and actions.    Status: Continued - Date(s):10/30/24      Intervention(s)  Therapist will teach strategies for cognitive modification using REBT models.    Objective #C  Client will Improve quantity and quality of night time sleep / decrease daytime naps.  Status: Continued - Date(s):10/30/24      Intervention(s)  Therapist will teach sleep hygiene strategies.  Assign homework for daily practice.    Goal 4: Client will report reduced or eliminated physiological activation when recalling a distressing event including decreased re-experiencing, decreased avoidance, and decreased hyperarousal.    Objective #A (Client Action)    Status:  Continued: 10/30/24       Client will acquire knowledge of trauma, common symptoms post-trauma, emotion regulation strategies, stress management strategies, and cognitive coping strategies.    Intervention(s)  Therapist will teach about effects of trauma on mind and body; encourage emotion identification and expression; practice with client the stress management strategies;  and teach cognitive modification.    Objective #B  Client will use Brainspotting while focusing on physiological sensations related to distressing events.  Client will assess and rate level of physiological activation.  Status: Continued - Date(s):10/30/24     Intervention(s)  Therapist will provide use a pointer or other materials to assist client in holding a brainspot in their visual field.  Therapist might also provide biolateral music through headphones to deepen the experience.         Patient has reviewed and agreed to the above plan.      Erika Colorado, LMFT  July 17, 2024

## 2025-01-08 NOTE — LETTER
[facility]  Patient Centered Plan of Care  About Me:        Patient Name:  Randi Zhou,     It was nice to talk with you today! Here is a copy of your Care Plan with the goal we are supporting you with at this time. Please let me know if I can help n any other way.     Thanks,     Dave Myhre, RN   [facility] RN  [phone]    YOB: 1955  Age:         69 year old   [place] MRN:    [id number] Telephone Information:  Home Phone [phone]   Mobile [phone]       Address:  [address] [place] [address] Email address:  [email or web address]      Emergency Contact(s)    Name Relationship Lgl Grd Work Phone Home Phone Mobile Phone   1. JOANIE ROBERTO Spouse No NONE [phone]   2. ANGELA ROBERTO Sister-in-Law   [phone]   3. BRITTNY PADILLA Friend   [phone]            Primary language:  English     needed? No   [facility]:  [phone] op. 1  Other communication barriers:None    Preferred Method of Communication:     Current living arrangement: I live in a private home with spouse    Mobility Status/ Medical Equipment: Independent w/Device        Health Maintenance  Health Maintenance Reviewed: Due/Overdue   Health Maintenance Due   Topic Date Due    HF ACTION PLAN  Never done    DIABETIC FOOT EXAM  Never done    COPD ACTION PLAN  Never done    HEPATITIS A IMMUNIZATION (1 of 2 - Risk 2-dose series) Never done    ZOSTER IMMUNIZATION (1 of 2) Never done    MEDICARE ANNUAL WELLNESS VISIT  Never done    COLORECTAL CANCER SCREENING  02/25/2024    A1C  11/21/2024          My Access Plan  Medical Emergency 911   Primary Clinic Line [facility] - [phone]   24 Hour Appointment Line [phone] or  [phone]) (toll-free)   24 Hour Nurse Line [phone] (toll-free)   Preferred Urgent Care [facility], [phone]     [facility]  [facility]  [phone]     Preferred Pharmacy [id number] [place], [place] - [address]     Behavioral Health Crisis Line The National Suicide Prevention Lifeline at 1-264.869.4245 or Text/Call 988           My Care Team Members  Patient Care Team         Relationship Specialty Notifications Start End    Kaushik Hobbs, CNP PCP - General Nurse Practitioner - Family  8/21/24     Phone: [phone] Fax: [phone] [address] [facility] [address] [facility] [address]    Eli Hilton, RN Specialty Care Coordinator Cardiology Admissions 8/13/19     Pulmonary HTN    Phone: [phone] Pager: [phone] Fax: [phone]       Bindu Walden, RN Specialty Care Coordinator Cardiology Admissions 8/26/19     Pulmonary HTN    Phone: [phone] Pager: [phone] Fax: [phone]       Alee Coker MD MD INTERNAL MEDICINE - ENDOCRINOLOGY, DIABETES & METABOLISM  9/26/19     Phone: [phone] Fax: [phone]         [address] [place] [address] [address] [facility] [address]    Frnacisco J Edwards MD MD Cardiology  3/17/20     Phone: [phone] Fax: [phone]         [address] [facility] [address]    Francisco J Edwards MD Assigned Heart and Vascular Provider   10/23/20     Phone: [phone] Fax: [phone]         [address] [place] [address]    Liza Reynoso, RN Specialty Care Coordinator Cardiology Admissions 4/5/22     Pulmonary HTN    Phone: [phone] Pager: [phone] Fax: [phone]       Waylon Catherine, PharmD Pharmacist Pharmacist  10/3/22     Phone: [phone] Fax: [phone]         [facility] [address] [place] [address]    Dusty Dubois MD Assigned Pain Medication Provider   1/9/23     Phone: [phone] Fax: [phone] [address] [facility] [address]    Aparna Hutchinson MD MD Neurology  3/27/23     Phone: [phone] Fax: [phone]          [address] [facility] [address]    Miki Richardson, RN Specialty Care Coordinator  Admissions 4/19/23     Phone: [phone] Pager: [phone]        Roland Das MD MD Psychiatry  7/27/23     Phone: [phone] Fax: [phone]         [address] [facility] [address]    Tova Pablo MD Assigned Cancer Care Provider   8/5/23     Phone: [phone] Fax: [phone]         [address] [facility] [address]    Kaushik Huffman DPM Assigned Surgical Provider   10/21/23     Phone: [phone] Fax: [phone]         [address] [facility] [address] [facility] [address]    Felicia Hicks, PharmD Pharmacist Pharmacist  10/31/23     Phone: [phone] Fax: [phone]         [address] [facility] [address] [facility] [address]    Felicia Hicks, PharmD Assigned [place] Pharmacist   11/4/23     Phone: [phone] Fax: [phone]         [address] [facility] [address] [facility] [address]    Amy Hughes, [facility] Pharmacist   12/29/23     Phone: [phone] Fax: [phone]         [facility] [address] [facility] [address] [facility] [address]    Alee Coker MD Assigned Endocrinology Provider   1/6/24     Phone: [phone] Fax: [phone]         [address] [facility] [address] [facility] [address]    Sharmila Gregory MD Assigned Pulmonology Provider   2/23/24     Phone: [phone] Fax: [phone]         [address] [facility] [address] [facility] [address]    Crissy Musa LPN Specialty Care Coordinator Hematology & Oncology Admissions 4/16/24     Jeannette Mendoza APRN CNP Nurse Practitioner Psychiatry  6/27/24     Phone: [phone] Fax: [phone]         [address] [place] [address]    Teetee Adames MD MD Cardiovascular Disease  7/16/24     Phone: [phone] Fax: [phone] [address] [facility] [address]    Tiara Javier APRN CNS Clinical Nurse Specialist Anesthesiology  7/16/24     Phone: [phone] Fax: [phone] [address] [facility] [address] [facility] [address]    Arlyn Stern PA Physician Assistant Cardiovascular Disease  10/15/24     Phone: [phone] Pager: [phone] Fax: [phone]        [facility] [address] [facility] [address]    Kaushik Hobbs CNP Assigned PCP   12/23/24     Phone: [phone] Fax: [phone] [address] [facility] [address] [facility] [address]    Erika Colorado, LMFT Assigned Behavioral Health Provider   12/23/24     Phone: [phone]          [facility]  [address] [facility] [address]                My Care Plans  Self Management and Treatment Plan    Care Plan  Care Plan: Mental Health       Problem: Mental Health Symptoms Need Improvement       Goal: I would like to feel as though my mental health has improved.       Start Date: 10/16/2024 Expected End Date: 10/15/2025    Recent Progress: 50%    Priority: High    Note:     Barriers: history of anxiety, bipolar disorder, trauma related disorder  Strengths: strong advocate for herself  Patient expressed understanding of goal: yes  Action steps to achieve this goal:  1. I will continue to attend all appointments with my therapist, with my psychiatric provider and attend all ECT treatments.   2. I continue to attend my group therapy appointments.   3. I will continue to the use the skills I have learned through therapy, partial hospitalization program and therapy group.   4. I will take my medications as directed.   5. I will report progress towards this goal at schedule outreach telephone calls from my Care Coordinator.     Discussed on 1/8/25                              Action Plans on File:                       Advance Care Plans/Directives:   Advanced Care Plan/Directives on file: No    Discussed with patient/caregiver(s): No data recorded           My Medical and Care Information  Problem List   Patient Active Problem List   Diagnosis    DJD (degenerative joint disease), ankle and foot     Hereditary hemochromatosis    Postablative hypothyroidism    Hx of corticosteroid therapy    Class 2 obesity due to excess calories without serious comorbidity in adult    KYAW (obstructive sleep apnea)    Prediabetes    Hypokalemia    COPD (chronic obstructive pulmonary disease) (H)    Generalized anxiety disorder    Restless leg syndrome    Vitamin B12 deficiency    Vitamin D deficiency    Morbid obesity (H)    Cord compression (H)    History of cervical spinal surgery    Cervical stenosis of spinal canal    Alcohol use disorder, moderate, in sustained remission (H)    Psoriatic arthropathy (H)    Primary osteoarthritis involving multiple joints    Gastroesophageal reflux disease with esophagitis without hemorrhage    Numbness in both hands    Opioid type dependence, continuous (H)    Trauma and stressor-related disorder    Post-menopausal    Depression with anxiety    Severe recurrent major depression without psychotic features (H)    MDD (major depressive disorder), recurrent episode, severe (H)    Status post electroconvulsive therapy    PAF (paroxysmal atrial fibrillation) (H)    Hiatal hernia    Paroxysmal atrial fibrillation (H)    Uric acid arthropathy    Chronic, continuous use of opioids    Severe episode of recurrent major depressive disorder, without psychotic features (H)      Current Medications:  Please refer to the most recent medication list provided to you by your medical team and reach out to your provider with any questions or to make any corrections.    Care Coordination Start Date: 9/21/2023   Frequency of Care Coordination: monthly, more frequently as needed     Form Last Updated: 01/08/2025

## 2025-01-08 NOTE — PROGRESS NOTES
Clinic Care Coordination Contact  Follow Up Progress Note      Assessment: CCC RN spoke with patient today to follow up on recent ED visit, goal and to discuss any needs or concerns. Patient had a fall at home related to syncopal event on 1/2/25. She has a follow up with her PCP on 1/15/25 to follow up from ED visit and to discuss if she can continue with ECT treatments. Patient feels her dizziness and syncopal event was related to her psychiatric medications. She said she has discussed this concern with her psychiatric provider. She was encouraged to have her vitals checked by her psychiatric provide. Patient is scheduled today for a nurse visit to have vitals checked.   Patient said she continues to work with her therapist on a regular basis and finds this helpful.     Care Gaps:    Health Maintenance Due   Topic Date Due    HF ACTION PLAN  Never done    DIABETIC FOOT EXAM  Never done    COPD ACTION PLAN  Never done    HEPATITIS A IMMUNIZATION (1 of 2 - Risk 2-dose series) Never done    ZOSTER IMMUNIZATION (1 of 2) Never done    MEDICARE ANNUAL WELLNESS VISIT  Never done    COLORECTAL CANCER SCREENING  02/25/2024    A1C  11/21/2024       Scheduled with PCP on 1/15/25      Care Plans  Care Plan: Mental Health       Problem: Mental Health Symptoms Need Improvement       Goal: I would like to feel as though my mental health has improved.       Start Date: 10/16/2024 Expected End Date: 10/15/2025    Recent Progress: 50%    Priority: High    Note:     Barriers: history of anxiety, bipolar disorder, trauma related disorder  Strengths: strong advocate for herself  Patient expressed understanding of goal: yes  Action steps to achieve this goal:  1. I will continue to attend all appointments with my therapist, with my psychiatric provider and attend all ECT treatments.   2. I continue to attend my group therapy appointments.   3. I will continue to the use the skills I have learned through therapy, partial hospitalization  program and therapy group.   4. I will take my medications as directed.   5. I will report progress towards this goal at schedule outreach telephone calls from my Care Coordinator.     Discussed on 1/8/25                              Intervention/Education provided during outreach: Discussed the importance of taking his medications as directed. Encouraged her to attend all scheduled appointments. Encouraged her to contact Care Coordination for any additional needs or concerns.      Outreach Frequency: monthly, more frequently as needed      Plan: CCC RN will continue to monitor, support patient with current goal and will be available to assist as nursing needs arise.     Care Coordinator will follow up in one month.

## 2025-01-08 NOTE — TELEPHONE ENCOUNTER
Patient was instructed by her psychiatrist to be seen to retake vitals today in clinic. Patient was placed on the [place] RN schedule for recheck today at 5 PM. If patient needs to be cancelled for any reason, please call and leave detailed message if she doesn't .     Debbie Kim RN  [facility]

## 2025-01-08 NOTE — PROGRESS NOTES
Patients. Vital signs recorded. Patient has headache and dizziness, restless leg. Patient concerned about medication be decreased. Patient will get back to Jeannette that vital signs are completed.   Mayra Kline LPN on 1/8/2025 at 5:29 PM

## 2025-01-15 ENCOUNTER — OFFICE VISIT (OUTPATIENT)
Dept: INTERNAL MEDICINE | Facility: CLINIC | Age: 70
End: 2025-01-15
Payer: MEDICARE

## 2025-01-15 VITALS
HEIGHT: 66 IN | OXYGEN SATURATION: 98 % | HEART RATE: 69 BPM | RESPIRATION RATE: 14 BRPM | BODY MASS INDEX: 27 KG/M2 | SYSTOLIC BLOOD PRESSURE: 146 MMHG | WEIGHT: 168 LBS | TEMPERATURE: 97.5 F | DIASTOLIC BLOOD PRESSURE: 80 MMHG

## 2025-01-15 DIAGNOSIS — J42 CHRONIC BRONCHITIS, UNSPECIFIED CHRONIC BRONCHITIS TYPE (H): ICD-10-CM

## 2025-01-15 DIAGNOSIS — K44.9 HIATAL HERNIA: ICD-10-CM

## 2025-01-15 DIAGNOSIS — R55 SYNCOPE, UNSPECIFIED SYNCOPE TYPE: ICD-10-CM

## 2025-01-15 DIAGNOSIS — F41.8 DEPRESSION WITH ANXIETY: Primary | ICD-10-CM

## 2025-01-15 DIAGNOSIS — J06.9 UPPER RESPIRATORY TRACT INFECTION, UNSPECIFIED TYPE: ICD-10-CM

## 2025-01-15 PROCEDURE — 99214 OFFICE O/P EST MOD 30 MIN: CPT | Performed by: NURSE PRACTITIONER

## 2025-01-15 PROCEDURE — G2211 COMPLEX E/M VISIT ADD ON: HCPCS | Performed by: NURSE PRACTITIONER

## 2025-01-15 RX ORDER — FLUTICASONE PROPIONATE AND SALMETEROL 113; 14 UG/1; UG/1
1 POWDER, METERED RESPIRATORY (INHALATION) 2 TIMES DAILY
Qty: 1 EACH | Refills: 10 | Status: SHIPPED | OUTPATIENT
Start: 2025-01-15

## 2025-01-15 NOTE — PROGRESS NOTES
Assessment & Plan     Depression with anxiety  She has cycled off of her psychiatric medications.  No longer doing Viibryd or trazodone.  Despite this, she says her mood is good but she is concerned that she will not be able to move forward to the ECT therapy, which she considers tremendously beneficial for her mood.    She tells me that she has done ECT without being on meds in the past and that she did fine at that time.    Syncope, unspecified syncope type  Unclear etiology.  It does not appear to be an underlying cardiac problem.  She was feeling a bit ill on the day of her fainting spell, and could have been dehydrated.    ED workup unremarkable.  She was in sinus rhythm at that time in the ED    Upper respiratory tract infection, unspecified type  Appears to be resolving quite nicely.  Normal vital signs today with unremarkable physical exam.    Chronic bronchitis, unspecified chronic bronchitis type (H)  The Trelegy is too expensive.  I will have her switch over to air duo and have her use a GoodRx coupon  - fluticasone-salmeterol (AIRDUO RESPICLICK) 113-14 MCG/ACT inhaler; Inhale 1 puff into the lungs 2 times daily.    Hiatal hernia  She has breakthrough acid reflux symptoms that seems to respond well to omeprazole  - omeprazole (PRILOSEC) 20 MG DR capsule; Take 1 capsule (20 mg) by mouth as needed.    Patient Instructions   I think you are healthy enough to proceed with your ECT, as far as I am concerned.    I cannot do much in terms of the cost of your Trelegy inhaler.  We could try sending in a prescription for air duo inhaler, I sent this prescription to [facility] and you can use a GoodRx coupon (I sent a coupon to your cell phone), this should cost about $50.    Do 2 puffs of the air duo inhaler twice daily.    Continue using albuterol as needed.    Follow-up in 1 month for routine checkup      The longitudinal plan of care for the diagnosis(es)/condition(s) as documented were addressed during this  "visit. Due to the added complexity in care, I will continue to support Winnie in the subsequent management and with ongoing continuity of care.          Subjective   Winnie is a 69 year old, presenting for the following health issues:    Follow Up (ER,)      1/15/2025     9:52 AM   Additional Questions   Roomed by      HPI     She had been feeling a bit ill on the day she went to the ED. She was sitting on the side of the bed while getting up when she fainted.     She presented to the ED and had a negative workup. Around this time, she had been having some medications changes with Psych; her vibryd had been increased.     She thinks that the ED was more concerned with her mood as opposed to physical symptoms.    Had some fleeting thoughts of SI; These medication changes have been stressful. No thoughts of SI today. She is just very stressed. She was having increased irritability.     She talked with her mental health provider, they decided to stop all of her meds. No more Vibryd, off of trazodone. Since stopping medications about one week ago mood has been better. She says that she \"feels like a human today.\" She seems to be concerned about serotonin syndrome.     CT of head and C spine negative.     Heart monitor came back with some pauses; cardiology aware and it seems to be correlated to when she was getting ECT. Cards felt OK for her to follow up in Feb as planned.     Since falling they canceled her shoulder surgery.           Objective    BP (!) 146/80 (BP Location: Right arm, Patient Position: Sitting, Cuff Size: Adult Regular)   Pulse 69   Temp 97.5  F (36.4  C) (Oral)   Resp 14   Ht 1.676 m (5' 6\")   Wt 76.2 kg (168 lb)   LMP  (LMP Unknown)   SpO2 98%   Breastfeeding No   BMI 27.12 kg/m    Body mass index is 27.12 kg/m .  Physical Exam   GENERAL: alert and no distress  NECK: no adenopathy, no asymmetry, masses, or scars  RESP: lungs clear to auscultation - no rales, rhonchi or wheezes  CV: regular " rate and rhythm, normal S1 S2, no S3 or S4, no murmur, click or rub, no peripheral edema  ABDOMEN: soft, nontender, no hepatosplenomegaly, no masses and bowel sounds normal  MS: no gross musculoskeletal defects noted, no edema            Signed Electronically by: Kaushik Hobbs, CNP

## 2025-01-15 NOTE — PATIENT INSTRUCTIONS
I think you are healthy enough to proceed with your ECT, as far as I am concerned.    I cannot do much in terms of the cost of your Trelegy inhaler.  We could try sending in a prescription for air duo inhaler, I sent this prescription to [employer] and you can use a [employer] coupon (I sent a coupon to your cell phone), this should cost about $50.    Do 2 puffs of the air duo inhaler twice daily.    Continue using albuterol as needed.    Follow-up in 1 month for routine checkup

## 2025-01-16 ENCOUNTER — VIRTUAL VISIT (OUTPATIENT)
Dept: PSYCHOLOGY | Facility: CLINIC | Age: 70
End: 2025-01-16
Payer: MEDICARE

## 2025-01-16 DIAGNOSIS — F41.1 GENERALIZED ANXIETY DISORDER: ICD-10-CM

## 2025-01-16 DIAGNOSIS — F33.2 MAJOR DEPRESSIVE DISORDER, RECURRENT SEVERE WITHOUT PSYCHOTIC FEATURES (H): Primary | ICD-10-CM

## 2025-01-16 NOTE — CONFIDENTIAL NOTE
[facility]                                     Progress Note    Patient Name: Randi Cleary  Date: 25       Service Type: Individual      Session Start Time: 2:30  Session End Time: 3:21     Session Length: 38-52    Session #: 16    Attendees: Client    Service Modality:  Video Visit:      Provider verified identity through the following two step process.  Patient provided:  Patient  and Patient address    Telemedicine Visit: The patient's condition can be safely assessed and treated via synchronous audio and visual telemedicine encounter.      Reason for Telemedicine Visit: Patient has requested telehealth visit    Originating Site (Patient Location): Patient's home    Distant Site (Provider Location): Provider Remote Setting- Home Office    Consent:  The patient/guardian has verbally consented to: the potential risks and benefits of telemedicine (video visit) versus in person care; bill my insurance or make self-payment for services provided; and responsibility for payment of non-covered services.     Patient would like the video invitation sent by:  My Chart    Mode of Communication:  Video Conference via [employer]well    Distant Location (Provider):  Off-site    As the provider I attest to compliance with applicable laws and regulations related to telemedicine.    DATA  Interactive Complexity: No  Crisis: No        Progress Since Last Session (Related to Symptoms / Goals / Homework):   Symptoms: Improving      Homework:  n/a      Episode of Care Goals: Satisfactory progress - ACTION (Actively working towards change); Intervened by reinforcing change plan / affirming steps taken     Current / Ongoing Stressors and Concerns:   Feeling more emotional and physical stable compared to recent weeks.  Reports positive appointment with PCP and is feeling hopeful about progress.  Hoping to continue working on organizing her house before contacting [facility] for assessment for additional  programs and help.      Treatment Objective(s) Addressed in This Session:   Distress tolerance     Intervention:   Validation, safety assessment.  Encouragement.  Small steps toward larger goals      Assessments completed prior to visit:  The following assessments were completed by patient for this visit:  PHQ9:       11/21/2024     8:31 AM 12/3/2024     9:10 AM 12/5/2024     2:43 PM 12/10/2024     9:24 AM 12/17/2024     9:11 AM 12/23/2024     7:30 AM 1/2/2025     2:05 PM   PHQ-9 SCORE   PHQ-9 Total Score MyChart 16 (Moderately severe depression) 17 (Moderately severe depression) 17 (Moderately severe depression) 15 (Moderately severe depression) 15 (Moderately severe depression) 0 24 (Severe depression)   PHQ-9 Total Score 16  17  17  15  15  15 24        Patient-reported     GAD7:       9/9/2024     1:33 PM 9/24/2024     9:54 AM 10/8/2024     6:29 AM 10/22/2024     8:59 AM 11/4/2024     2:38 PM 12/3/2024     9:10 AM 12/17/2024     9:13 AM   CHAVO-7 SCORE   Total Score 13 (moderate anxiety) 9 (mild anxiety) 9 (mild anxiety) 11 (moderate anxiety) 10 (moderate anxiety) 17 (severe anxiety) 13 (moderate anxiety)   Total Score 13 9 9    9 11    11 10  17  13        Patient-reported     CAGE-AID:       6/22/2020     7:12 PM 1/18/2022    11:15 PM 6/6/2024     8:00 AM   CAGE-AID Total Score   Total Score 4 4 4   Total Score MyChart 4 (A total score of 2 or greater is considered clinically significant) 4 (A total score of 2 or greater is considered clinically significant)      PROMIS 10-Global Health (all questions and answers displayed):       10/30/2024     8:48 AM 11/6/2024    12:54 PM 11/14/2024    11:35 AM 11/26/2024     5:00 PM 12/4/2024     9:28 AM 12/18/2024     9:10 AM 12/30/2024     5:44 PM   PROMIS 10   In general, would you say your health is: Fair Fair Fair  Good Fair Fair   In general, would you say your quality of life is: Poor Poor Poor  Poor Fair Poor   In general, how would you rate your physical health?  Poor Good Fair  Good Fair Fair   In general, how would you rate your mental health, including your mood and your ability to think? Fair Fair Fair  Fair Fair Poor   In general, how would you rate your satisfaction with your social activities and relationships? Fair Poor Poor  Poor Poor Poor   In general, please rate how well you carry out your usual social activities and roles Poor Poor Fair  Poor Poor Poor   To what extent are you able to carry out your everyday physical activities such as walking, climbing stairs, carrying groceries, or moving a chair? Moderately Moderately Moderately  Moderately Moderately Moderately   In the past 7 days, how often have you been bothered by emotional problems such as feeling anxious, depressed, or irritable? Often Often Often  Often Often Often   In the past 7 days, how would you rate your fatigue on average? Moderate Moderate Moderate  Moderate Moderate Moderate   In the past 7 days, how would you rate your pain on average, where 0 means no pain, and 10 means worst imaginable pain? 7 7 7  8 7 8   In general, would you say your health is: 2 2 2  3 2 2   In general, would you say your quality of life is: 1 1 1  1 2 1   In general, how would you rate your physical health? 1 3 2  3 2 2   In general, how would you rate your mental health, including your mood and your ability to think? 2 2 2  2 2 1   In general, how would you rate your satisfaction with your social activities and relationships? 2 1 1  1 1 1   In general, please rate how well you carry out your usual social activities and roles. (This includes activities at home, at work and in your community, and responsibilities as a parent, child, spouse, employee, friend, etc.) 1 1 2  1 1 1   To what extent are you able to carry out your everyday physical activities such as walking, climbing stairs, carrying groceries, or moving a chair? 3 3 3  3 3 3   In the past 7 days, how often have you been bothered by emotional problems such  as feeling anxious, depressed, or irritable? 4 4 4  4 4 4   In the past 7 days, how would you rate your fatigue on average? 3 3 3  3 3 3   In the past 7 days, how would you rate your pain on average, where 0 means no pain, and 10 means worst imaginable pain? 7 7 7  8 7 8   Global Mental Health Score 7  6  6   6  7  5    Global Physical Health Score 9  11  10   11  10  10    PROMIS TOTAL - SUBSCORES 16  17  16   17  17  15        Information is confidential and restricted. Go to Review Flowsheets to unlock data.    Patient-reported     [place] Suicide Severity Rating Scale (Lifetime/Recent)      6/11/2020    10:00 AM 1/9/2023     1:00 PM 5/21/2024     4:49 PM 5/21/2024     9:00 PM 6/6/2024     8:00 AM 7/10/2024    12:00 PM 11/22/2024    11:05 AM   [place] Suicide Severity Rating (Lifetime/Recent)   Q1 Wish to be Dead (Lifetime) Yes   Yes      Comments when patient was in treatment and there were family concerns   OD on medications      Q2 Non-Specific Active Suicidal Thoughts (Lifetime) Yes   No      Most Severe Ideation Rating (Lifetime) 5   5      Most Severe Ideation Description (Lifetime) when family problems were happening   OD on medication      Frequency (Lifetime) --   3      Duration (Lifetime) --   3      Controllability (Lifetime) 0   2      Protective Factors  (Lifetime) 5   4      Reasons for Ideation (Lifetime) --   5      Q1 Wished to be Dead (Past Month)  yes 1-->yes 1-->yes 1-->yes  0-->no   Q2 Suicidal Thoughts (Past Month)  no 1-->yes 1-->yes 1-->yes  0-->no   Q3 Suicidal Thought Method  no 0-->no 0-->no 1-->yes     Q4 Suicidal Intent without Specific Plan  no 1-->yes 0-->no 0-->no     Q5 Suicide Intent with Specific Plan  no 0-->no 0-->no 1-->yes     Q6 Suicide Behavior (Lifetime)  yes 1-->yes 0-->no 1-->yes  0-->no   If yes to Q6, within past 3 months?  no 1-->yes 0-->no 0-->no     Level of Risk per Screen  moderate risk high risk moderate risk high risk  no risks indicated   RETIRED: 1. Wish  to be Dead (Recent) No         RETIRED: 2. Non-Specific Active Suicidal Thoughts (Recent) No         3. Active Suicidal Ideation with any Methods (Not Plan) Without Intent to Act (Lifetime) Yes         RETIRE: Active Suicidal Ideation with any Methods (Not Plan) Description (Lifetime) 30 years prior         RETIRE: 4. Active Suicidal Ideation with Some Intent to Act, Without Specific Plan (Lifetime) Yes         RETIRE: 5. Active Suicidal Ideation with Specific Plan and Intent (Lifetime) Yes         Actual Attempt (Lifetime) Yes         Actual Attempt Description (Lifetime) patient reported overdosing on Prozac about thirty years ago         Total Number of Actual Attempts (Lifetime) 1         Actual Attempt (Past 3 Months) No         Has subject engaged in non-suicidal self-injurious behavior? (Lifetime) Yes         Has subject engaged in non-suicidal self-injurious behavior? (Past 3 Months) No         Interrupted Attempts (Lifetime) No         Interrupted Attempts (Past 3 Months) No         Aborted or Self-Interrupted Attempt (Lifetime) No         Aborted or Self-Interrupted Attempt (Past 3 Months) No         Preparatory Acts or Behavior (Lifetime) No         Preparatory Acts or Behavior (Past 3 Months) No         Most Recent Attempt Date --         Comments 30 years prior         Most Recent Attempt Actual Lethality Code 0         Q1 Wish to be Dead (Lifetime)      N    Q2 Non-Specific Active Suicidal Thoughts (Lifetime)      N          ASSESSMENT: Current Emotional / Mental Status (status of significant symptoms):   Risk status (Self / Other harm or suicidal ideation)   Patient denies current fears or concerns for personal safety.   Patient denies current or recent suicidal ideation or behaviors.   Patient denies current or recent homicidal ideation or behaviors.   Patient denies current or recent self injurious behavior or ideation.   Patient denies other safety concerns.   Patient reports there has been no  change in risk factors since their last session.     Patient reports there has been no change in protective factors since their last session.     A safety and risk management plan has been developed including: Patient consented to co-developed safety plan on 6/4/24.  Safety and risk management plan was reviewed.   Patient agreed to use safety plan should any safety concerns arise.  A copy was made available to the patient.     Appearance:   Appropriate    Eye Contact:   Good    Psychomotor Behavior: Normal    Attitude:   Cooperative  Friendly   Orientation:   All   Speech    Rate / Production: Normal/ Responsive    Volume:  Normal    Mood:    Sad    Affect:    Subdued    Thought Content:  Clear    Thought Form:  Goal Directed  Logical    Insight:    Good      Medication Review:   No changes to current psychiatric medication(s)     Medication Compliance:   Yes     Changes in Health Issues:   None reported     Chemical Use Review:   Substance Use: Chemical use reviewed, no active concerns identified      Tobacco Use: No current tobacco use.      Diagnosis:  1. 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 Generalized anxiety disorder        Collateral Reports Completed:   Not Applicable    PLAN: (Patient Tasks / Therapist Tasks / Other)  Continue following safety plan.  Call [facility] for [place] Choices assessment.  Work slowly on desk cleaning project this weekend.  Continue advocating for herself.      Erika Colorado, EWELINA                                                         ______________________________________________________________________    Individual Treatment Plan    Patient's Name: Randi Cleary  YOB: 1955    Date of Creation: 7/17/2024  Date Treatment Plan Last Reviewed/Revised: 7/17/2024, 10/30/24     DSM5 Diagnoses:   296.33 (F33.2) Major Depressive Disorder, Recurrent Episode, Severe   300.02 (F41.1) Generalized Anxiety  Disorder  Psychosocial / Contextual Factors: history of trauma  PROMIS (reviewed every 90 days):     Referral / Collaboration:  Discussed and patient will pursue a therapy group for depressive symptoms.    Anticipated number of session for this episode of care: 9-12 sessions  Anticipation frequency of session: Weekly  Anticipated Duration of each session: 38-52 minutes  Treatment plan will be reviewed in 90 days or when goals have been changed.       MeasurableTreatment Goal(s) related to diagnosis / functional impairment(s)  Goal 1: Client will keep self safe and eliminate suicidal ideation and self-harm behaviors.    Objective #A (Client Action)    Client will make a list of at least 10 skills or activities that you will to use to distract from urges to harm self.  Status: Completed - Date: 10/30/24       Intervention(s)  Therapist will provide ideas and strategies for generating coping skills list.    Objective #B  Client will make a list of pros and cons for tolerating and not tolerating an urge to harm self.  Status: Continued - Date(s):10/30/24      Intervention(s)  Therapist will guide client through DBT-style Pros/Cons list for behavior change.    Objective #C  Client will identify and practice the new strategies for dealing with strong emotions, learn and practice relaxation breathing.  Status: Continued - Date(s):10/30/24      Intervention(s)  Therapist will teach distraction skills. Practice them within session and outside of session.    Goal 2: Client will report reduction in anxiety also as evidenced by reduction of CHAVO-7 score below 6 points within the next 12 weeks.    Objective #A (Client Action)    Client will identify at least three triggers for anxiety.  Status: Completed - Date: 10/30/24       Intervention(s)  Therapist will provide educational materials on common triggers and signs of anxiety.    Objective #B  Client will use relaxation strategies at least two times per day to reduce the  physical symptoms of anxiety.  Status: Continued - Date(s):10/30/24      Intervention(s)  Therapist will teach relaxation strategies such as mindfulness, deep breathing, muscle relaxation, and sensory activities.    Objective #C  Client will use cognitive strategies identified in therapy to challenge anxious thoughts.  Status: Continued - Date(s):10/30/24      Intervention(s)  Therapist will teach strategies for cognitive modification using REBT model.    Goal 3: Client will report improved mood as indicated by PHQ-9 score below 6 for consistent 8 weeks.    Objective #A (Client Action)    Status: Continued - Date: 10/30/24     Client will Increase interest, engagement, and pleasure in doing things.    Intervention(s)  Therapist will assign homework for daily involvement in pleasant activities.    Objective #B  Client will Identify negative self-talk and behaviors: challenge core beliefs, myths, and actions.    Status: Continued - Date(s):10/30/24      Intervention(s)  Therapist will teach strategies for cognitive modification using REBT models.    Objective #C  Client will Improve quantity and quality of night time sleep / decrease daytime naps.  Status: Continued - Date(s):10/30/24      Intervention(s)  Therapist will teach sleep hygiene strategies.  Assign homework for daily practice.    Goal 4: Client will report reduced or eliminated physiological activation when recalling a distressing event including decreased re-experiencing, decreased avoidance, and decreased hyperarousal.    Objective #A (Client Action)    Status:  Continued: 10/30/24       Client will acquire knowledge of trauma, common symptoms post-trauma, emotion regulation strategies, stress management strategies, and cognitive coping strategies.    Intervention(s)  Therapist will teach about effects of trauma on mind and body; encourage emotion identification and expression; practice with client the stress management strategies; and teach cognitive  modification.    Objective #B  Client will use Brainspotting while focusing on physiological sensations related to distressing events.  Client will assess and rate level of physiological activation.  Status: Continued - Date(s):10/30/24     Intervention(s)  Therapist will provide use a pointer or other materials to assist client in holding a brainspot in their visual field.  Therapist might also provide biolateral music through headphones to deepen the experience.         Patient has reviewed and agreed to the above plan.      Erika Colorado, LMFT  July 17, 2024

## 2025-01-21 ENCOUNTER — ANESTHESIA EVENT (OUTPATIENT)
Dept: BEHAVIORAL HEALTH | Facility: CLINIC | Age: 70
End: 2025-01-21

## 2025-01-21 ASSESSMENT — COPD QUESTIONNAIRES: COPD: 1

## 2025-01-21 NOTE — ANESTHESIA PREPROCEDURE EVALUATION
Anesthesia Pre-Procedure Evaluation    Patient: Randi Cleary   MRN: [id number] : 1955        Procedure :   Electroconvulsive Therapy       Past Medical History:   Diagnosis Date     Bipolar 2 disorder (H)      Breast cancer (H)     lumpectomy, radiation, chemo     Chronic pain syndrome      COPD (chronic obstructive pulmonary disease) (H)     asthma     Cord compression (H) 2021     Dizzy      Drug tolerance     opioid     Esophageal reflux      Fatigue      Generalized anxiety disorder      Graves disease      Hemochromatosis 2018    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; H63D not detected     History of breast cancer 2020    Formatting of this note might be different from the original. Created by Conversion  Replacement Utility updated for latest IMO load Formatting of this note might be different from the original. Created by Conversion  Replacement Utility updated for latest IMO load     History of corticosteroid therapy 2019     History of partial adrenalectomy 2019     History of pheochromocytoma 2019     Hx antineoplastic chemotherapy      Hx of radiation therapy      Hyperlipidaemia      Hypertension      Impaired fasting glucose      Injury of neck, whiplash 07/15/2021     Joint pain      KYAW (obstructive sleep apnea) 2016     Osteopenia      Pheochromocytoma, left 2017    laparoscopically removed     Postablative hypothyroidism 1995     Prediabetes 10/03/2019    by A1c     Psoriasis      Psoriatic arthropathy (H)      Pulmonary hypertension (H)      Right rotator cuff tear      RLS (restless legs syndrome)     on ropinorole     Sacroiliitis      Serotonin syndrome 2020    [facility] - While on desvenlafaxine 100mg     Snoring      Spinal stenosis      Status post coronary angiogram 10/03/2019     Urinary incontinence      Vitamin B 12 deficiency 2009     Vitamin D deficiency 2010      Past Surgical History:   Procedure Laterality Date      ARTHRODESIS ANKLE       ARTHROPLASTY ANKLE Right 6/29/2015    Procedure: ARTHROPLASTY ANKLE;  Surgeon: Jason Coughlin MD;  Location: [facility]     ARTHROPLASTY REVISION ANKLE Right 6/29/2015    Procedure: ARTHROPLASTY REVISION ANKLE;  Surgeon: Jason Coughlin MD;  Location:  [facility]     BIOPSY BREAST       BREAST BIOPSY, CORE RT/LT       COLONOSCOPY       COLONOSCOPY N/A 2/25/2021    Procedure: COLONOSCOPY;  Surgeon: Guru Elke Tolbert MD;  Location:  GI     CV CORONARY ANGIOGRAM N/A 10/3/2019    Procedure: CV CORONARY ANGIOGRAM;  Surgeon: Bryce Pierre MD;  Location:  HEART CARDIAC CATH LAB     CV RIGHT HEART CATH MEASUREMENTS RECORDED N/A 10/3/2019    Procedure: CV RIGHT HEART CATH;  Surgeon: Bryce Pierre MD;  Location:  HEART CARDIAC CATH LAB     CV RIGHT HEART CATH MEASUREMENTS RECORDED N/A 9/25/2024    Procedure: Heart Cath Right Heart Cath;  Surgeon: George Becker MD;  Location:  HEART CARDIAC CATH LAB     ESOPHAGOSCOPY, GASTROSCOPY, DUODENOSCOPY (EGD), COMBINED N/A 2/25/2021    Procedure: ESOPHAGOGASTRODUODENOSCOPY, WITH BIOPSY;  Surgeon: Guru Elke Tolbert MD;  Location:  GI     EYE SURGERY  2021     HC REMOVE TONSILS/ADENOIDS,<13 Y/O      Description: Tonsillectomy With Adenoidectomy;  Recorded: 04/07/2010;     IR LUMBAR EPIDURAL STEROID INJECTION  10/26/2004     IR LUMBAR EPIDURAL STEROID INJECTION  11/16/2004     IR LUMBAR EPIDURAL STEROID INJECTION  12/21/2004     IR LUMBAR EPIDURAL STEROID INJECTION  6/8/2006     JOINT REPLACEMENT       LAMINOPLASTY CERVICAL POSTERIOR THREE+ LEVELS Left 12/21/2021    Procedure: CERVICAL 3-CERVICAL 6 LEFT OPEN DOOR LAMINOPLASTY AND LEFT CERVICAL 4-5 AND CERVICAL 6-7 POSTERIOR FORAMINOTOMY;  Surgeon: Angela Gregory MD;  Location: [facility] Main OR     LAPAROSCOPIC ADRENALECTOMY Left 08/02/2017    pheochromocytoma     LAPAROSCOPIC ADRENALECTOMY Left 8/2/2017    Procedure: LAPAROSCOPIC LEFT  "ADRENALECTOMY, ;  Surgeon: Gab Linares MD;  Location: [facility] OR;  Service:      LENGTHEN TENDON ACHILLES Right 6/29/2015    Procedure: LENGTHEN TENDON ACHILLES;  Surgeon: Jason Coughlin MD;  Location: [facility]     LUMPECTOMY BREAST       LUMPECTOMY BREAST Left 1994     MAMMOPLASTY REDUCTION Right 01/13/2015    [place]     MAMMOPLASTY REDUCTION Right     approx late 2015/early2016     MASTECTOMY      left lumpectomy with axillary node dissection     MASTECTOMY MODIFIED RADICAL       OTHER SURGICAL HISTORY Right     reconstructive breast surgery     OTHER SURGICAL HISTORY      Adrenalectomy for pheochromocytoma     DE MASTECTOMY, MODIFIED RADICAL      Description: Modified Radical Mastectomy Left Breast;  Recorded: 04/07/2010;     REPAIR HAMMER TOE Right 6/29/2015    Procedure: REPAIR HAMMER TOE;  Surgeon: Jason Coughlin MD;  Location: [facility]     TONSILLECTOMY       TONSILLECTOMY & ADENOIDECTOMY       ZZC ARTHRODESIS,ANKLE,OPEN Right     Description: Ankle Arthrodesis;  Recorded: 04/07/2010;      Allergies   Allergen Reactions     Serotonin Reuptake Inhibitors (Ssris) Anxiety, Difficulty breathing, Headache, Palpitations and Shortness Of Breath     Buspirone      The patient states she had serotonin syndrome     Cephalexin      Other reaction(s): unknown rxn.     Desvenlafaxine      Serotonin syndrome     Diclofenac Sodium [Diclofenac]      Serotonin syndrome and restless legs syndrome     Gabapentin      Drove on the wrong side of the highway     Levofloxacin      \"CAN'T REMEMBER\"     Penicillins      \"SORES IN MOUTH\"     Riluzole Difficulty breathing and Swelling     Sulfa Antibiotics      \"PT DOES NOT KNOW WHAT THE REACTION WAS\"     Topiramate Other (See Comments)     Frequent urination      Social History     Tobacco Use     Smoking status: Former     Current packs/day: 0.00     Average packs/day: 2.5 packs/day for 29.2 years (72.9 ttl pk-yrs)     Types: Cigarettes     Start date: 5/1/1971     " Quit date: 2000     Years since quittin.5     Passive exposure: Current     Smokeless tobacco: Never   Substance Use Topics     Alcohol use: Not Currently     Comment: relapse 2021 sober       Wt Readings from Last 1 Encounters:   01/15/25 76.2 kg (168 lb)        Anesthesia Evaluation   Pt has had prior anesthetic.     History of anesthetic complications       ROS/MED HX  ENT/Pulmonary:     (+) sleep apnea,                         COPD,              Neurologic:       Cardiovascular:     (+)  hypertension- -   -  - -                                pulmonary hypertension,      METS/Exercise Tolerance:     Hematologic:       Musculoskeletal:       GI/Hepatic:     (+) GERD,     hiatal hernia,              Renal/Genitourinary:       Endo:     (+)          thyroid problem,     Obesity,       Psychiatric/Substance Use:       Infectious Disease:       Malignancy:       Other:            Physical Exam    Airway        Mallampati: II   TM distance: > 3 FB   Neck ROM: full   Mouth opening: > 3 cm    Respiratory Devices and Support         Dental       (+) Modest Abnormalities - crowns, retainers, 1 or 2 missing teeth      Cardiovascular          Rhythm and rate: regular and normal     Pulmonary           breath sounds clear to auscultation       OUTSIDE LABS:  CBC:   Lab Results   Component Value Date    WBC 6.9 2024    WBC 4.4 2024    HGB 17.8 (H) 2024    HGB 14.9 2024    HCT 51.1 (H) 2024    HCT 43.2 2024     2024     2024     BMP:   Lab Results   Component Value Date     2024     2024    POTASSIUM 4.9 2024    POTASSIUM 3.6 2024    CHLORIDE 103 2024    CHLORIDE 110 (H) 2024    CO2 27 2024    CO2 25 2024    BUN 14.2 2024    BUN 12.0 2024    CR 0.65 2024    CR 0.64 2024    GLC 91 2024     (H) 2024     COAGS:   Lab Results   Component Value Date    PTT  "34 12/13/2021    INR 0.99 11/23/2024     POC:   Lab Results   Component Value Date     (H) 02/25/2021     HEPATIC:   Lab Results   Component Value Date    ALBUMIN 4.5 12/19/2024    PROTTOTAL 7.7 12/19/2024    ALT 15 12/19/2024    AST 28 12/19/2024    ALKPHOS 75 12/19/2024    BILITOTAL 0.5 12/19/2024     OTHER:   Lab Results   Component Value Date    A1C 5.7 (H) 08/21/2024    LINDA 9.9 12/19/2024    MAG 1.9 11/22/2024    TSH 0.40 11/22/2024    T4 1.49 03/29/2024    T3 114 01/18/2023    CRP <2.9 11/16/2021    SED 8 10/17/2023       Anesthesia Plan    ASA Status:  2    NPO Status:  NPO Appropriate    Anesthesia Type: General.     - Airway: Native airway   Induction: Intravenous.           Consents            Postoperative Care    Pain management: Multi-modal analgesia.   PONV prophylaxis: Ondansetron (or other 5HT-3)     Comments:               Camilo Stroud MD    I have reviewed the pertinent notes and labs in the chart from the past 30 days and (re)examined the patient.  Any updates or changes from those notes are reflected in this note.    Clinically Significant Risk Factors Present on Admission                             # Overweight: Estimated body mass index is 27.12 kg/m  as calculated from the following:    Height as of 1/15/25: 1.676 m (5' 6\").    Weight as of 1/15/25: 76.2 kg (168 lb).                "

## 2025-01-22 ENCOUNTER — ANESTHESIA (OUTPATIENT)
Dept: BEHAVIORAL HEALTH | Facility: CLINIC | Age: 70
End: 2025-01-22
Payer: MEDICARE

## 2025-01-22 ENCOUNTER — HOSPITAL ENCOUNTER (OUTPATIENT)
Dept: BEHAVIORAL HEALTH | Facility: CLINIC | Age: 70
Discharge: HOME | End: 2025-01-22
Attending: PSYCHIATRY & NEUROLOGY
Payer: MEDICARE

## 2025-01-22 VITALS
DIASTOLIC BLOOD PRESSURE: 78 MMHG | SYSTOLIC BLOOD PRESSURE: 99 MMHG | OXYGEN SATURATION: 93 % | TEMPERATURE: 97.9 F | RESPIRATION RATE: 21 BRPM | HEART RATE: 74 BPM

## 2025-01-22 DIAGNOSIS — F33.2 SEVERE RECURRENT MAJOR DEPRESSION WITHOUT PSYCHOTIC FEATURES (H): Primary | ICD-10-CM

## 2025-01-22 PROCEDURE — 250N000009 HC RX 250

## 2025-01-22 PROCEDURE — 250N000011 HC RX IP 250 OP 636: Performed by: PSYCHIATRY & NEUROLOGY

## 2025-01-22 PROCEDURE — 250N000011 HC RX IP 250 OP 636

## 2025-01-22 PROCEDURE — 90870 ELECTROCONVULSIVE THERAPY: CPT | Performed by: PSYCHIATRY & NEUROLOGY

## 2025-01-22 PROCEDURE — 370N000017 HC ANESTHESIA TECHNICAL FEE, PER MIN

## 2025-01-22 PROCEDURE — 90870 ELECTROCONVULSIVE THERAPY: CPT

## 2025-01-22 RX ORDER — OXYCODONE HYDROCHLORIDE 5 MG/1
10 TABLET ORAL
Status: DISCONTINUED | OUTPATIENT
Start: 2025-01-22 | End: 2025-01-23 | Stop reason: HOSPADM

## 2025-01-22 RX ORDER — ONDANSETRON 4 MG/1
4 TABLET, ORALLY DISINTEGRATING ORAL EVERY 30 MIN PRN
Status: DISCONTINUED | OUTPATIENT
Start: 2025-01-22 | End: 2025-01-23 | Stop reason: HOSPADM

## 2025-01-22 RX ORDER — KETOROLAC TROMETHAMINE 30 MG/ML
30 INJECTION, SOLUTION INTRAMUSCULAR; INTRAVENOUS ONCE
Status: COMPLETED | OUTPATIENT
Start: 2025-01-22 | End: 2025-01-22

## 2025-01-22 RX ORDER — HYDROMORPHONE HCL IN WATER/PF 6 MG/30 ML
0.4 PATIENT CONTROLLED ANALGESIA SYRINGE INTRAVENOUS EVERY 5 MIN PRN
Status: DISCONTINUED | OUTPATIENT
Start: 2025-01-22 | End: 2025-01-23 | Stop reason: HOSPADM

## 2025-01-22 RX ORDER — LABETALOL HYDROCHLORIDE 5 MG/ML
INJECTION, SOLUTION INTRAVENOUS PRN
Status: DISCONTINUED | OUTPATIENT
Start: 2025-01-22 | End: 2025-01-22

## 2025-01-22 RX ORDER — DEXAMETHASONE SODIUM PHOSPHATE 4 MG/ML
4 INJECTION, SOLUTION INTRA-ARTICULAR; INTRALESIONAL; INTRAMUSCULAR; INTRAVENOUS; SOFT TISSUE
Status: DISCONTINUED | OUTPATIENT
Start: 2025-01-22 | End: 2025-01-23 | Stop reason: HOSPADM

## 2025-01-22 RX ORDER — NALOXONE HYDROCHLORIDE 0.4 MG/ML
0.1 INJECTION, SOLUTION INTRAMUSCULAR; INTRAVENOUS; SUBCUTANEOUS
Status: DISCONTINUED | OUTPATIENT
Start: 2025-01-22 | End: 2025-01-23 | Stop reason: HOSPADM

## 2025-01-22 RX ORDER — NICARDIPINE HCL-0.9% SOD CHLOR 1 MG/10 ML
SYRINGE (ML) INTRAVENOUS PRN
Status: DISCONTINUED | OUTPATIENT
Start: 2025-01-22 | End: 2025-01-22

## 2025-01-22 RX ORDER — OXYCODONE HYDROCHLORIDE 5 MG/1
5 TABLET ORAL
Status: DISCONTINUED | OUTPATIENT
Start: 2025-01-22 | End: 2025-01-23 | Stop reason: HOSPADM

## 2025-01-22 RX ORDER — METHOHEXITAL IN WATER/PF 100MG/10ML
SYRINGE (ML) INTRAVENOUS PRN
Status: DISCONTINUED | OUTPATIENT
Start: 2025-01-22 | End: 2025-01-22

## 2025-01-22 RX ORDER — SODIUM CHLORIDE, SODIUM LACTATE, POTASSIUM CHLORIDE, CALCIUM CHLORIDE 600; 310; 30; 20 MG/100ML; MG/100ML; MG/100ML; MG/100ML
INJECTION, SOLUTION INTRAVENOUS CONTINUOUS
Status: DISCONTINUED | OUTPATIENT
Start: 2025-01-22 | End: 2025-01-23 | Stop reason: HOSPADM

## 2025-01-22 RX ORDER — HYDROMORPHONE HCL IN WATER/PF 6 MG/30 ML
0.2 PATIENT CONTROLLED ANALGESIA SYRINGE INTRAVENOUS EVERY 5 MIN PRN
Status: DISCONTINUED | OUTPATIENT
Start: 2025-01-22 | End: 2025-01-23 | Stop reason: HOSPADM

## 2025-01-22 RX ORDER — FENTANYL CITRATE 50 UG/ML
25 INJECTION, SOLUTION INTRAMUSCULAR; INTRAVENOUS EVERY 5 MIN PRN
Status: DISCONTINUED | OUTPATIENT
Start: 2025-01-22 | End: 2025-01-23 | Stop reason: HOSPADM

## 2025-01-22 RX ORDER — ONDANSETRON 2 MG/ML
4 INJECTION INTRAMUSCULAR; INTRAVENOUS EVERY 30 MIN PRN
Status: DISCONTINUED | OUTPATIENT
Start: 2025-01-22 | End: 2025-01-23 | Stop reason: HOSPADM

## 2025-01-22 RX ORDER — KETOROLAC TROMETHAMINE 30 MG/ML
30 INJECTION, SOLUTION INTRAMUSCULAR; INTRAVENOUS ONCE
Start: 2025-01-22 | End: 2025-01-22

## 2025-01-22 RX ORDER — FENTANYL CITRATE 50 UG/ML
50 INJECTION, SOLUTION INTRAMUSCULAR; INTRAVENOUS EVERY 5 MIN PRN
Status: DISCONTINUED | OUTPATIENT
Start: 2025-01-22 | End: 2025-01-23 | Stop reason: HOSPADM

## 2025-01-22 RX ADMIN — KETOROLAC TROMETHAMINE 30 MG: 30 INJECTION, SOLUTION INTRAMUSCULAR at 08:41

## 2025-01-22 RX ADMIN — LABETALOL HYDROCHLORIDE 20 MG: 5 INJECTION, SOLUTION INTRAVENOUS at 08:58

## 2025-01-22 RX ADMIN — SUCCINYLCHOLINE CHLORIDE 90 MG: 20 INJECTION, SOLUTION INTRAMUSCULAR; INTRAVENOUS; PARENTERAL at 08:58

## 2025-01-22 RX ADMIN — Medication 800 MCG: at 08:58

## 2025-01-22 RX ADMIN — Medication 100 MG: at 08:58

## 2025-01-22 NOTE — DISCHARGE INSTRUCTIONS
ECT Discharge Instructions      During your ECT series:    Do not drive or work heavy equipment until 7 days after your last treatment.  Do not drink alcohol or use street drugs (illicit drugs) while you are having treatments.  Do not make important decisions, including legal decisions.    After each treatment:    Get plenty of rest. A responsible adult must stay with your for at least 6 hours.  Avoid heavy or risky activities for 24 hours while in maintenance ECT.   Do not drive for at least 24 hours after your treatment while in maintenance ECT.   If you have more than one treatment within one week, do not drive for 7 days after your last treatment.   If you feel light-headed, sit for a few minutes before standing. Have someone help you get up.  If you have nausea (feel sick to your stomach): Drink only clear liquids such as apple juice, ginger ale, broth or 7UP, Be sure to drink plenty of liquids. Move to a normal diet as you feel able.   If you received Toradol, wait 6 hours before taking ibuprofen.  Call your doctor if:   You have a fever over 100F (37.7 C) (taken under the tongue), or a fever that last more than 24 hours.  Your IV site is red, swollen, very painful or is getting more tender.  You have nausea that gets very bad or does not improve.    If you have any symptoms after ECT, tell our staff before your next treatment.  The ECT Department can be reached at [phone].  The ECT Department is open Mondays, Wednesdays and Fridays from 7:00 AM to 2:00 PM.    To speak to a doctor, call:  Your primary care provider or [facility] for Dr. Riley, Dr. Beavers, Dr. Hutchison or Dr. Cotter PHONE: [phone]; fax: [phone]    New instructions:  Discharge instructions:               -- Remember to NOT take gabapentin after 6pm the night before each treatment  -- During maintenance, you can't drive for 24 hours after each treatment.     - We discussed other alternatives including trying ketamine  through the [facility] or staying on your regular medications and therapy, but at the moment you were most interested in pursuing maintenance      Your next ECT appointment will now be scheduled by a scheduling service. They should call you within 24 hours of your ECT appointment. For any urgent scheduling needs or to change your appointment, please call the ECT department at [phone] and they will get in touch with scheduling for you.     You have received Toradol today at 0841am. Toradol is an NSAID.  Do not take any NSAID's including: Ibuprofen, Naproxen Sodium, Asprin, Advil, Motrin, Aleve, Shannan, etc., until six hours after the above time which would be 0241pm. If you have any questions check back with your Physician, or pharmacist.     Covid-19 Testing:  We are no longer requiring covid tests prior to your procedure. If you are ill, please call the ECT department to discuss plan for rescheduling your appointment. [phone]    NEW: Please come to the [facility] entrance at [address] [place] [address] and check in at Room NB14. You will receive your wristband and stickers there and can then come down to the ECT department in the basement of the [facility].       After your appointment, you may be picked up at the [facility] entrance, which is located at [address] [place] [address].  [id number], about 1 block North of the [facility] entrance.    Transported by:   [facility]/    Reviewed AVS discharge instructions with:   [facility]/

## 2025-01-22 NOTE — PROCEDURES
"Procedures  [facility], [place]   ECT Procedure Note   01/22/2025    Randi Cleary is a 68 year old female patient.  [id number]    Patient Status: outpatient    Is this the first in a series of 12 treatments?  No      Allergies   Allergen Reactions    Serotonin Reuptake Inhibitors (Ssris) Anxiety, Difficulty breathing, Headache, Palpitations and Shortness Of Breath    Buspirone      The patient states she had serotonin syndrome    Cephalexin      Other reaction(s): unknown rxn.    Desvenlafaxine      Serotonin syndrome    Diclofenac Sodium [Diclofenac]      Serotonin syndrome and restless legs syndrome    Gabapentin      Drove on the wrong side of the highway    Levofloxacin      \"CAN'T REMEMBER\"    Penicillins      \"SORES IN MOUTH\"    Riluzole Difficulty breathing and Swelling    Sulfa Antibiotics      \"PT DOES NOT KNOW WHAT THE REACTION WAS\"    Topiramate Other (See Comments)     Frequent urination    Dextromethorphan Anxiety       Weight:  0 lbs 0 oz / 0 kg          Indications for ECT:   Medications ineffective and Psychotherapies ineffective         Clinical Narrative:   HPI - The patient describes a lifelong history of depression dating back to elementary school, worsening after her father's death when she was 15yo and especially in the 1980s in the setting of worsening physical health (since falling and breaking her ankle), which led to the the initiation of fluoxetine, her first antidepressant.  Her history has been primarily characterized by low mood, with some ups and downs but no extended depression-free periods since then.  She has tried many different medications from different classes, but cannot recall any one being particularly helpful for her.  She has experienced past episodes of particularly irritable mood and concomitant decreased sleep need, although most of these have lasted 1-2 days and triggered by anger related to external stressors.  She has at least one " "episode of a more extended episode of elevated mood (and associated grandiosity, increased spending, flight of ideas, increased goal directed behaviors, pressured speech, and odd and embarrassing behavior), which occurred in the context of relapse on alcohol in 2021. She does not endorse any events before about age 48.     The patient's depression is characterized by near daily low mood, frequent anhedonia, with sleep difficulties (although c/b pain, KYAW, and RLS), fatigue, feelings of guilt/worthlessness, and impaired concentration.  She reports feelings of \"despair,\" at times with active SI with plan, but denies any intent to act on this - \"maybe it's hope.\"  She has 1 lifetime suicide attempt (overdose) in the 1980s, for which she was psychiatrically hospitalized.  She has a remote history of SIB (cutting) but not recently.       Psych pertinent item history includes includes suicide attempt , suicidal ideation, SIB , aggression, trauma hx, substance use: alcohol, cannabis, and Patient has a history of alcohol dependence treated 20+ years ago and she relapsed in 2020 for a couple of months, in her 20s she\" loved getting high\" on cocaine, LSD, mushrooms, speed, white cross, mutiple psychotropic trials , psych hosp, ketamine, and major medical problems.    Target Symptoms for Improvement: Amotivation, Fatigue, Improved self-image and Panic attacks         Diagnosis:   Major depression         Assessment:   #1 05/24/24 Some circumstantial thought, needs some redirection, 3.5 hours sleep last night, anxious, mood 1/10, PDW but no SI, never had ECT before - only TMS. No AVH.   #2 05/29/24  Mood 4/10, better over w/e, some word find difficulties, no AVH/SI/PDW. Chronic sciatica pain, neck and shoulder pain - didn't worsen with ECT. Mild headache.   #3 05/31/24  Mood 4/10, No SI, PDW, AVH, some wrist pain and headache, memory might be improving.    #4 6/3/24  Phq-9= 13, mood 3/10.  Yesterday was very tearful, still a " "bit today.  Last treatment had awareness under paralysis.  Otherwise, some initial confusion after first ECT but no subsequent cognitive effects.  Signed consent to continue.  #5 6/5/24 Mood 4-5/10  She feels like her \"rage\" is less and she feels lighter. but no cognitive side effects. She complains of excessive sweating and is concerned her thryoid is unbalanced. She is somatically focused She reports pain on the side of her neck radiating down to her chest. She had a migraine she thinks over the weekend which usual starts as occipital tension.  #6 6/7/24 mood 4-5/10. No SI. She does have some baseline long term memory difficulties no AVH.   #7 6/10/24  She still is worried that She is not able to go home. She had visitors this weekend who said \"you don't seem to have the weight of the world on your shoulders anymore\" she disagrees she had a downward spiral over the weekend when there was a mix up with her clothes and she usually feels tearful after ECT. She does not report anything feeling worse. She gets down on herself when she has an anxiety spiral and it was the 1st one she has had since admission.   #8 6/12/24 Winnie reported some tearfulness overnight. She is noticing a weight lifting mood 6/10 . Reports some difficulty with remembering names but believes this is at baseline.   #9 6/14/24 Mood 5/10 (10 best) She feels like she is improving each time . She still has occasional crying spells but she feels she bounces back quicker. She is still anxious about going home. No Si. Tolerating ECT  #10 06/17/24 mood 5/10 She does feellike she has gotten some improvement since starting Ect but still has times where she gets tearful and anxious \"goes down the rabit hole\" but not as often . She has had ketamine troches before with pain  management to no effect but wonders about ketamine infusions.  #11 06/19/24 Mood today is 5/10. Patient is wondering whether she could do a ketamine course (did have ketamine in the past), " "despite of being on opioids. Feels that ketamine can help with \"anger, tearing and anxiety.\" She complains to the anesthesiologist about recent urinary incontinence which needs to be considered when  using ketamine. She wants to try it for anger ,tearing and anxiety which is not a standard indication  #12 06/21/24 Mood 5/10, no PDW/SI, but some anxiety around pain this AM.  Patient is interested in maintenance ECT at her attending psychiatrist's recommendation to prevent the possibility of her mood dipping as low as it did previously that led to her hospitalization.  Discussed pros and cons of maintenance ECT, suggested alternative of maintenance medications/therapy and/or watchful waiting with possibility of retreatment should she relapse, as well as alternate interventions including ketamine.  At the moment, she is most interested in pursuing maintenance ECT - will plan for next Friday pending confirmation with her family that she has post-ECT ride and monitoring.  M1 06/28/24 Mood ups and downs, irritability, anxiety, sadness switching multiple times a day since being discharged home.  Had difficulty with the transition home, was harder than she thought it would be.  However, still able to \"push away\" PDW/SI, and did not have periods of persistently low mood this past week.  Has noticed some anterograde and retrograde amnesia of the 1-2 months prior to starting ECT.  Will continue to track.  Today, mood 6/10 \"now that I'm at ECT.\"  M2 07/05/24 She was tearful, states that she doesn't feel good, \"starting to drop\", states that the mood change happened in Wednesday somewhat suddenly, when she encountered several business stressors and felt overwhelmed. Mood 3-4/10. Denies SI and feels safe at home. Still reports memory issues. Reports that the day program has been overwhelming.    She cancelled her colonoscopy in order to focus on her right heart cath the next week. She feels like she has too many procedures " scheduled and pushed off her sleep study also.  M3 7/12/24 Doing well.  Somewhat stressed witht all the medical appointments she has to coordinate. Her colonoscopy got cancelled because of her upcoming appointment with cardiology. Canceled the outpatient program but interested in doing the group therapy at [facility].   M4 7/19/24 Doing well. Less frustrated and started therapy. Reports short term memory impairment. That said, she is hesitant to space ECT to [id number]  M5 8/2/24 Mood 5-6/10 she struggles some with the interval felling more irritable and tearful the second week. She felt some Si yesterday because she was frustrated and overehwlemd but no SI today. Cogntiively processing speed is affected and does better if she can get a hint. Encouraged her to take her viibryd as prescribed  M6 08/16/24  She is having headaches she attributes to the viibryd and encouraged her to adhere. She reports she still has lability between session lots of stressors with medical billing errors and feeling like she is hounded by collections mood 4/10. Tolerating Ect without complaints of cognitive side effects. Spent birthday in still water   M7 09/04/24  called earlier today with plan to cancel because she was feeling overwhelmed and had poor sleep the night before. Encouraged her to attend . She was very stressed because her electricity was out for mo than a week and her internet is out now . She still gets many incorrect medical bills which are very stressful  some trouble with naming songs on the radio  M8 09/13/24 Winnie is focused on her upcoming shoulder surgery and wants to make sure we talk about ECT and her recovery period. Will meet with surgeon in October and plan surgery in november. Mood is struggling because insomnia is still a problem despite low dose of trazodone    M9 09/27/24  she discontinued her viibryd as she attributes insomnia to that medication. Encourages her to start Auvelity which is [facility] new foundational  "antidepressant. She had a successful cath lab visit and went out to celebrate and ended up having a fall and hit her head and was worked-up in the ED.  She reports she was tearful yesterdya she is still having mood dips in between sessions so not comfortable spacing out until she is re-established on antidepressant  M10 10/11/24: Mood is doing relatively well but has had some difficulties recovering after the fall, difficulty breathing attributed to bruised rib.  Now has rash at site of COVID vaccine from Wed, warm and red c/f cellulitis.  Trying to start Auvelity in parts due to lack of coverage - hasn't happened yet.  Will continue [id number] maintenance while stabilizing meds and awaiting ortho surgery.  Mood: 7/10 (10=best), PHQ-9: 9    M11 10/25/24: Patient is feeling overwhelmed by medical appointments and commitments, has had worsening irritability related to this.  Recognizes that her mood is still overall better, but these acute stressors lead to an up-and-down over the course of the week, and there have been more downs this week.  Started faux-velity, some headaches but hoping these will resolve.  She is concerned that she won't be able to make it 6 weeks after her ortho surgery without ECT, but will continue to discuss.  Denies PDW/SI - \"I've done a reassessment\" and recognizes these stressors make things hard but \"I used to love life.\"  Denies adverse effects other than insomnia night before treatment due to holding Neurontin - will ask about alternate sleep options.   M12 Mood 6-7/10 going to group. Got good news from right heart cath. She has word finding difficulty. Wants to take up Mahjong. Got out into her yard for the first time in a long while. No falls. She doesn't like abilify thinks it is causing tinnitus and jaw clenching but will try to keep for now. PHQ-9=11 QIDS=18  M13 11/22/24: Mood has been \"not great\" the last two weeks, in setting of poor sleep, acute psychosocial stressors, medications " "changes, and cutting down on cannabis.  Wants to talk to primary psychiatrist about better control for sleep.  Does still feel that clarity of thought and motivation remain high overall, ECT still helping.  Will keep q2week ECT for now, discussed trial at 3    Complicated by: new onset Afib, regular rate -> referred to ED  M14 12/13/24: Delayed scheduled visit last week due to need for Cardiac clearance following Afib from last treatment.  Saw cardiology, who cleared patient to continue without anticoagulation due to CHADSVASC = 2.  Today, mood started to drift about a week ago, most notably irritability, but not the worst it's been.  Denies PDW/SI.  She is anticipating her surgery 1/8/24, and would like to do a treatment in 2 weeks \"as usual\" and then ideally on 1/6/24 right before the surgery.  We will book next treatment in 2 weeks, but patient to call if she's doing well and okay to space additional week.  Mood: 6/10 (10=best), PHQ-9: 15      01/22/25  Returns today after prolonged interval because she needed repeat medical clearance due to multiple ED visits for falls and dizziness. She discontinued her oral antidepressants. She feels irritable anxious overwhelmed by medical complications. Cancelled her shoulder surgery. She blames serotonin for her dizziness and falls and gillespie snot want to take psychotropic medication she thinks ECT is the most helpful for her. Mood 2/10. Showered 3-4 x a week sitz bath and sink hair wash on other days, eating 2 meals a day managing meds but difficulty initing decluttering tasks as she is a hoarder. Passive SI \"Everything just feels so hard\" PHQ_9=12          Pause for the Cause:     Correct patient Yes   Correct procedure/laterality settings: Yes           Intra-Procedure Documentation:     ECT #: 27   Treatment number this series: M15   Total treatment number: 26     Type of ECT:  Right, unilateral ultrabrief    ECT Medications:    Toradol 30mg iv - for headache/myalgia "     Brevital: 100 mg (incr from 90 mg as still awake)   Succinyl Choline: 90 mg    BP - per anesthesia team     ECT Strip Summary: RUL Titration #3: 38.4 mC   Energy Level:  384.0mC, 0.3 ms, 100 Hz, 8 sec, 800 mA     Motor Seizure Duration: 20 seconds  EEG Seizure Duration: 31 seconds      Time for re-orientation:     Complications:   none      Plan:   - Plan for maintenance in 2 weeks 2/5/25  - Monitor depression severity with clinical assessment augmented with PHQ9 every other treatment  - Continue current medications    Discharge instructions:    -- Remember to NOT take gabapentin after 6pm the night before each treatment  -- During maintenance, you can't drive for 24 hours after each treatment.    - We discussed other alternatives including trying ketamine through the [facility] or staying on your regular medications and therapy, but at the moment you were most interested in pursuing maintenance        Toan Cotter

## 2025-01-22 NOTE — ANESTHESIA POSTPROCEDURE EVALUATION
Patient: Randi Cleary    Procedure: * No procedures listed *  Electroconvulsive Therapy    Anesthesia Type:  General    Note:  Disposition: Outpatient   Postop Pain Control: Uneventful            Sign Out: Well controlled pain   PONV: No   Neuro/Psych: Uneventful            Sign Out: Acceptable/Baseline neuro status   Airway/Respiratory: Uneventful            Sign Out: Acceptable/Baseline resp. status   CV/Hemodynamics: Uneventful            Sign Out: Acceptable CV status; No obvious hypovolemia; No obvious fluid overload   Other NRE: NONE   DID A NON-ROUTINE EVENT OCCUR? No       Last vitals:  Vitals:    01/22/25 0816 01/22/25 0900 01/22/25 0915   BP: 108/62 99/57 122/75   Pulse: 74 60 75   Resp: (!) 16 20 18   Temp: 36.7  C (98  F) (!) 35.8  C (96.4  F) 36.5  C (97.7  F)   SpO2: 95% 97% 98%       Electronically Signed By: Camilo Stroud MD  January 22, 2025  9:21 AM

## 2025-01-22 NOTE — PROGRESS NOTES
Patients VSS, A/O, IV removed, meets phase 2 criteria and is able  to discharge home at this time. Discharge instructions given to Sidney/. Pt transported by staff via wheelchair.

## 2025-01-22 NOTE — ANESTHESIA CARE TRANSFER NOTE
Patient: Randi Cleary    Procedure: * No procedures listed *  Electroconvulsive Therapy    Diagnosis: * No pre-op diagnosis entered *  Diagnosis Additional Information: No value filed.    Anesthesia Type:   General     Note:    Oropharynx: oropharynx clear of all foreign objects  Level of Consciousness: drowsy  Oxygen Supplementation: face mask  Level of Supplemental Oxygen (L/min / FiO2): 8  Independent Airway: airway patency satisfactory and stable  Dentition: dentition unchanged  Vital Signs Stable: post-procedure vital signs reviewed and stable  Report to RN Given: handoff report given  Patient transferred to: PACU    Handoff Report: Identifed the Patient, Identified the Reponsible Provider, Reviewed the pertinent medical history, Discussed the surgical course, Reviewed Intra-OP anesthesia mangement and issues during anesthesia, Set expectations for post-procedure period and Allowed opportunity for questions and acknowledgement of understanding  Vitals:  Vitals Value Taken Time   /75 01/22/25 0915   Temp 36.5  C (97.7  F) 01/22/25 0915   Pulse 75 01/22/25 0915   Resp 18 01/22/25 0915   SpO2 98 % 01/22/25 0915       Electronically Signed By: Camilo Stroud MD  January 22, 2025  9:20 AM

## 2025-01-23 ENCOUNTER — VIRTUAL VISIT (OUTPATIENT)
Dept: PSYCHIATRY | Facility: CLINIC | Age: 70
End: 2025-01-23
Payer: MEDICARE

## 2025-01-23 DIAGNOSIS — F41.1 GAD (GENERALIZED ANXIETY DISORDER): ICD-10-CM

## 2025-01-23 DIAGNOSIS — F43.9 TRAUMA AND STRESSOR-RELATED DISORDER: ICD-10-CM

## 2025-01-23 DIAGNOSIS — G47.00 PERSISTENT INSOMNIA: ICD-10-CM

## 2025-01-23 DIAGNOSIS — F33.2 SEVERE RECURRENT MAJOR DEPRESSION WITHOUT PSYCHOTIC FEATURES (H): Primary | ICD-10-CM

## 2025-01-23 ASSESSMENT — ANXIETY QUESTIONNAIRES
IF YOU CHECKED OFF ANY PROBLEMS ON THIS QUESTIONNAIRE, HOW DIFFICULT HAVE THESE PROBLEMS MADE IT FOR YOU TO DO YOUR WORK, TAKE CARE OF THINGS AT HOME, OR GET ALONG WITH OTHER PEOPLE: SOMEWHAT DIFFICULT
GAD7 TOTAL SCORE: 11
8. IF YOU CHECKED OFF ANY PROBLEMS, HOW DIFFICULT HAVE THESE MADE IT FOR YOU TO DO YOUR WORK, TAKE CARE OF THINGS AT HOME, OR GET ALONG WITH OTHER PEOPLE?: SOMEWHAT DIFFICULT
6. BECOMING EASILY ANNOYED OR IRRITABLE: MORE THAN HALF THE DAYS
7. FEELING AFRAID AS IF SOMETHING AWFUL MIGHT HAPPEN: MORE THAN HALF THE DAYS
7. FEELING AFRAID AS IF SOMETHING AWFUL MIGHT HAPPEN: MORE THAN HALF THE DAYS
4. TROUBLE RELAXING: MORE THAN HALF THE DAYS
GAD7 TOTAL SCORE: 11
4. TROUBLE RELAXING: MORE THAN HALF THE DAYS
1. FEELING NERVOUS, ANXIOUS, OR ON EDGE: MORE THAN HALF THE DAYS
GAD7 TOTAL SCORE: 11
3. WORRYING TOO MUCH ABOUT DIFFERENT THINGS: SEVERAL DAYS
7. FEELING AFRAID AS IF SOMETHING AWFUL MIGHT HAPPEN: MORE THAN HALF THE DAYS
5. BEING SO RESTLESS THAT IT IS HARD TO SIT STILL: SEVERAL DAYS
8. IF YOU CHECKED OFF ANY PROBLEMS, HOW DIFFICULT HAVE THESE MADE IT FOR YOU TO DO YOUR WORK, TAKE CARE OF THINGS AT HOME, OR GET ALONG WITH OTHER PEOPLE?: SOMEWHAT DIFFICULT
6. BECOMING EASILY ANNOYED OR IRRITABLE: MORE THAN HALF THE DAYS
3. WORRYING TOO MUCH ABOUT DIFFERENT THINGS: SEVERAL DAYS
2. NOT BEING ABLE TO STOP OR CONTROL WORRYING: SEVERAL DAYS
1. FEELING NERVOUS, ANXIOUS, OR ON EDGE: MORE THAN HALF THE DAYS
GAD7 TOTAL SCORE: 11
7. FEELING AFRAID AS IF SOMETHING AWFUL MIGHT HAPPEN: MORE THAN HALF THE DAYS
2. NOT BEING ABLE TO STOP OR CONTROL WORRYING: SEVERAL DAYS
IF YOU CHECKED OFF ANY PROBLEMS ON THIS QUESTIONNAIRE, HOW DIFFICULT HAVE THESE PROBLEMS MADE IT FOR YOU TO DO YOUR WORK, TAKE CARE OF THINGS AT HOME, OR GET ALONG WITH OTHER PEOPLE: SOMEWHAT DIFFICULT
5. BEING SO RESTLESS THAT IT IS HARD TO SIT STILL: SEVERAL DAYS

## 2025-01-23 ASSESSMENT — PATIENT HEALTH QUESTIONNAIRE - PHQ9
10. IF YOU CHECKED OFF ANY PROBLEMS, HOW DIFFICULT HAVE THESE PROBLEMS MADE IT FOR YOU TO DO YOUR WORK, TAKE CARE OF THINGS AT HOME, OR GET ALONG WITH OTHER PEOPLE: EXTREMELY DIFFICULT
SUM OF ALL RESPONSES TO PHQ QUESTIONS 1-9: 15
SUM OF ALL RESPONSES TO PHQ QUESTIONS 1-9: 15

## 2025-01-23 ASSESSMENT — PAIN SCALES - GENERAL: PAINLEVEL_OUTOF10: MODERATE PAIN (6)

## 2025-01-23 NOTE — NURSING NOTE
Current patient location: [address] [place] [id number]    Is the patient currently in the state of [place]? YES    Visit mode: VIDEO    If the visit is dropped, the patient can be reconnected by:VIDEO VISIT: Send to e-mail at: [email or web address]    Will anyone else be joining the visit? NO  (If patient encounters technical issues they should call [phone]    Are changes needed to the allergy or medication list? No    Are refills needed on medications prescribed by this physician? NO    Rooming Documentation:  Questionnaire(s) completed    Reason for visit: TK KURTZ
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
## 2025-01-23 NOTE — PROGRESS NOTES
"Virtual Visit Details    Type of service:  Video Visit   Video Start Time: {video visit start/end time for provider to select:320553}  Video End Time:{video visit start/end time for provider to select:915420}    Originating Location (pt. Location): {video visit patient location:697254::\"Home\"}  {PROVIDER LOCATION On-site should be selected for visits conducted from your clinic location or adjoining [facility] hospital, academic office, or other nearby [facility] building. Off-site should be selected for all other provider locations, including home:[id number]}  Distant Location (provider location):  {virtual location provider:053678}  Platform used for Video Visit: {Virtual Visit Platforms:918209::\"Hall\"}    "

## 2025-01-23 NOTE — PATIENT INSTRUCTIONS
Plan:  -Continue gabapentin 100-200mg at bedtime and 100-200mg every day PRN anxiety  -Change Silexan to 160mg nightly     **For crisis resources, please see the information at the end of this document**   Patient Education    Thank you for coming to the [facility].     Lab Testing:  If you had lab testing today and your results are reassuring or normal they will be mailed to you or sent through [facility] within 7 days. If the lab tests need quick action we will call you with the results. The phone number we will call with results is # [phone]. If this is not the best number please call our clinic and change the number.     Medication Refills:  If you need any refills please call your pharmacy and they will contact us. Our fax number for refills is [phone].   Three business days of notice are needed for general medication refill requests.   Five business days of notice are needed for controlled substance refill requests.   If you need to change to a different pharmacy, please contact the new pharmacy directly. The new pharmacy will help you get your medications transferred.     Contact Us:  Please call [phone] during business hours (8-5:00 M-F).   If you have medication related questions after clinic hours, or on the weekend, please call [phone].     Financial Assistance [phone]   Medical Records [phone]       MENTAL HEALTH CRISIS RESOURCES:  For a emergency help, please call 911 or go to the nearest Emergency Department.     Emergency Walk-In Options:   [facility] Unit @ [facility] ([place]): [phone] - Specialized mental health emergency area designed to be calming  [facility] ([place]): [phone]  [facility] ([place]): [phone]  [facility]): [phone]    [facility] Crisis Information:   [place]: [phone]  [place]: [phone]  [place]) -  Adult: [phone]     Child: [phone]  [place] - Adult: [phone]     Child: [phone]  [place]: [phone]  List of all [facility] resources:   https://[place].gov/dhs/people-we-serve/adults/health-care/mental-health/resources/crisis-contacts.jsp    National Crisis Information:   Crisis Text Line: Text  [place]  to 653518  Suicide & Crisis Lifeline: 988  National Suicide Prevention Lifeline: 0-093-376-TALK (1-739.129.8887)       For online chat options, visit https://suicidepreventionlifeline.org/chat/  Poison Control Center: 1-337.188.9551  Trans Lifeline: 1-962.388.9152 - Hotline for transgender people of all ages  [employer]: 4-791-617-8425 - Hotline for LGBT youth     For Non-Emergency Support:   Fast Tracker: Mental Health & Substance Use Disorder Resources -   https://www.[a]list gamesckermn.org/

## 2025-01-27 ENCOUNTER — VIRTUAL VISIT (OUTPATIENT)
Dept: PSYCHOLOGY | Facility: CLINIC | Age: 70
End: 2025-01-27
Payer: MEDICARE

## 2025-01-27 DIAGNOSIS — F33.2 MAJOR DEPRESSIVE DISORDER, RECURRENT SEVERE WITHOUT PSYCHOTIC FEATURES (H): Primary | ICD-10-CM

## 2025-01-27 PROCEDURE — 99207 PR NO CHARGE LOS: CPT | Mod: 95 | Performed by: MARRIAGE & FAMILY THERAPIST

## 2025-01-28 ENCOUNTER — OFFICE VISIT (OUTPATIENT)
Dept: PSYCHIATRY | Facility: CLINIC | Age: 70
End: 2025-01-28
Payer: MEDICARE

## 2025-01-28 DIAGNOSIS — F41.1 GAD (GENERALIZED ANXIETY DISORDER): ICD-10-CM

## 2025-01-28 DIAGNOSIS — F33.2 SEVERE RECURRENT MAJOR DEPRESSION WITHOUT PSYCHOTIC FEATURES (H): Primary | ICD-10-CM

## 2025-01-28 NOTE — PROGRESS NOTES
Met with patient briefly and needed to end appointment after three minutes due to therapist emergency.  Therapist left voicemail for patient three hours later to let her know that therapist was okay and would hope to reschedule for this week.

## 2025-02-03 ENCOUNTER — VIRTUAL VISIT (OUTPATIENT)
Dept: INTERNAL MEDICINE | Facility: CLINIC | Age: 70
End: 2025-02-03
Payer: MEDICARE

## 2025-02-03 ENCOUNTER — VIRTUAL VISIT (OUTPATIENT)
Dept: PSYCHOLOGY | Facility: CLINIC | Age: 70
End: 2025-02-03
Payer: MEDICARE

## 2025-02-03 DIAGNOSIS — F41.1 GENERALIZED ANXIETY DISORDER: ICD-10-CM

## 2025-02-03 DIAGNOSIS — J42 CHRONIC BRONCHITIS, UNSPECIFIED CHRONIC BRONCHITIS TYPE (H): Primary | ICD-10-CM

## 2025-02-03 DIAGNOSIS — K44.9 HIATAL HERNIA: ICD-10-CM

## 2025-02-03 DIAGNOSIS — F33.2 MAJOR DEPRESSIVE DISORDER, RECURRENT SEVERE WITHOUT PSYCHOTIC FEATURES (H): Primary | ICD-10-CM

## 2025-02-03 PROCEDURE — 98005 SYNCH AUDIO-VIDEO EST LOW 20: CPT | Performed by: NURSE PRACTITIONER

## 2025-02-03 PROCEDURE — 90837 PSYTX W PT 60 MINUTES: CPT | Mod: 95 | Performed by: MARRIAGE & FAMILY THERAPIST

## 2025-02-03 RX ORDER — OMEPRAZOLE 20 MG/1
20 CAPSULE, DELAYED RELEASE ORAL PRN
Qty: 90 CAPSULE | Refills: 3 | Status: SHIPPED | OUTPATIENT
Start: 2025-02-03

## 2025-02-03 RX ORDER — FLUTICASONE FUROATE, UMECLIDINIUM BROMIDE AND VILANTEROL TRIFENATATE 100; 62.5; 25 UG/1; UG/1; UG/1
1 POWDER RESPIRATORY (INHALATION) DAILY
Qty: 60 EACH | Refills: 3 | Status: SHIPPED | OUTPATIENT
Start: 2025-02-03

## 2025-02-03 NOTE — PROGRESS NOTES
Interventional Psychiatry Program   Treatment Resistant Depression (TRD) Group  [facility], [facility]      Patient: Randi Cleary (1955)     MRN: [id number]  Date:  1/28/25  Duration of Treatment: Start Time: 10:30 am; End Time: 12:00  Providers/Group Facilitators: Arsenio Jiang, PhD, LP; Tiffany Mustafa MA (PhD student)    Number of Participants: 4  Group Format: In Person    People present:   Providers: Arsenio Jiang and Tiffany Mustafa  Patient: Randi Cleary  Others: Other patients    Diagnosis:  Major depressive disorder, severe, recurrent, without psychotic features  Generalized anxiety disorder      Assessment (current symptoms):        8/26/2024    12:37 PM 8/27/2024    12:00 PM 9/9/2024    12:36 PM   PHQ   PHQ-9 Total Score 0 24 16   Q9: Thoughts of better off dead/self-harm past 2 weeks Not at all  Not at all Not at all        Patient-reported         9/28/2024     5:29 AM 10/29/2024    10:13 AM 11/2/2024     8:28 AM   CHAVO-7 SCORE   Total Score 2 (minimal anxiety)  1 (minimal anxiety)   Total Score 2 2  1        Patient-reported     The treatment resistant depression (TRD) group is based on the cognitive behavioral therapy model that uses psychoeducation, behavioral activation, mindfulness, supportive techniques, problem solving techniques, and social skills.       Inclusion criteria for group participation: Current patient in the [facility] TRD program; agreement to group format and guidelines discussed in first session    Session 1 Content:  Began with mindfulness exercise  Reviewed group outline including schedule and agreements as well as typical group structure  Elective topic: Cognition and CBT  Engaged in discussion on pt's experiences in CBT therapy, specifically when working on thoughts     Description: Pt was engaged in group and made supportive comments of other members as well as self-disclosure that was appropriate and  aided the group learning.     Patient's reaction to treatment strategies: Pt was positively engaged in group and helpful to others. Pt shared examples which helped illustrate the concepts for themselves and other patients.     Patient's progress toward treatment goals: Pt expressed commitment to working in group therapy.         Mental Status Exam:  Alertness: alert  and oriented  Appearance: adequately groomed  Behavior/Demeanor: cooperative, pleasant, and calm, with good  eye contact   Speech: normal  Language: intact. Preferred language identified as English.  Psychomotor: normal or unremarkable  Mood: dysphoric  Affect: labile; was congruent to mood; was congruent to content  Thought Process/Associations: unremarkable  Thought Content:  unremarkable  -Suicidal ideation: not reported  -Homicidal Ideation: not reported  Perception:  intact  Insight: good  Judgment: good  Cognition: does  appear grossly intact          Outpatient Treatment Plan      Date of 1st group [email or web address]     Diagnosis:  Major depressive disorder, severe, recurrent, without psychotic features  Generalized anxiety disorder    Current symptoms and circumstances that substantiate the diagnosis:  Persistent dysphoria, anhedonia, sleep disturbance, low energy/fatigue, trouble concentrating, and interfering anxiety and irritability.     How symptoms and/or behaviors are affecting level of function:  Reduced social engagement/social isolation; minimal physical activity    Risk Assessment:  Suicide:  Assessed Level of Immediate Risk: Low  Ideation: denied thoughts of suicide or self-harm  Denies self-harm action  Plan:  denies plan for suicide and self-harm  Safety: denied safety concerns     Homicide/Violence:  Assessed Level of Immediate Risk: Low  Ideation: Denies  Plan: n/a  Means: No  Intent: No          SYMPTOMS; PROBLEMS   MEASURABLE GOALS;    FUNCTIONAL IMPROVEMENT INTERVENTIONS Gains Made:     Depression reduce depressive symptoms,  reduce depressive episodes, and report feeling more positive about self   Increase engagement with value-driven activities Group therapy Pt is seeking to maintain gains     Frequency of Sessions: Weekly    Discharge and Aftercare Goals: see measurable goals above  Expected duration of treatment: 8 weeks  Participants in therapy plan (family, friends, support network): self     Patient verbally consented to the treatment plan above.    Arsenio Jiang, PhD LP on 2/3/2025 at 3:13 PM      Answers submitted by the patient for this visit:  Patient Health Questionnaire (Submitted on 1/28/2025)  If you checked off any problems, how difficult have these problems made it for you to do your work, take care of things at home, or get along with other people?: Very difficult  PHQ9 TOTAL SCORE: 15  Patient Health Questionnaire (G7) (Submitted on 1/23/2025)  CHAVO 7 TOTAL SCORE: 11

## 2025-02-03 NOTE — PROGRESS NOTES
"Winnie is a 69 year old who is being evaluated via a billable video visit.          Assessment & Plan     Chronic bronchitis, unspecified chronic bronchitis type (H)  Winnie called into the clinic last week, approximately 5 days ago with coughing, wheezing, productive sputum.    She was worried about a COPD exacerbation.  A colleague of mine sent in a prescription for azithromycin.  She says she is feeling better today.    However, she has concerns about the air duo inhaler.  She would like to go back on Trelegy.    We switched her from Trelegy to air duo due to cost.  She understands that the Trelegy is likely to be expensive for her.    - Fluticasone-Umeclidin-Vilant (TRELEGY ELLIPTA) 100-62.5-25 MCG/ACT oral inhaler; Inhale 1 puff into the lungs daily.    Hiatal hernia    - omeprazole (PRILOSEC) 20 MG DR capsule; Take 1 capsule (20 mg) by mouth as needed.        The longitudinal plan of care for the diagnosis(es)/condition(s) as documented were addressed during this visit. Due to the added complexity in care, I will continue to support Winnie in the subsequent management and with ongoing continuity of care.        BMI  Estimated body mass index is 27.12 kg/m  as calculated from the following:    Height as of 1/15/25: 1.676 m (5' 6\").    Weight as of 1/15/25: 76.2 kg (168 lb).             Subjective   Winnie is a 69 year old, presenting for the following health issues:  Follow Up (Still coughing up green sputum.)        2/3/2025     4:07 PM   Additional Questions   Roomed by rohini SKINNER     Video Start Time:  430 pm    History of Present Illness       COPD:  She presents for follow up of COPD.   Overall, COPD symptoms are much worse since last visit. She has more than usual fatigue or shortness of breath with exertion and same as usual shortness of breath at rest.  She often coughs and does have change in sputum. No recent fever. She can walk 2-5 blocks without stopping to rest. She can walk 2 flights of stairs without " resting. The patient has had no ED, urgent care, or hospital admissions because of COPD since the last visit. She consumes 0 sweetened beverage(s) daily.She exercises with enough effort to increase her heart rate 60 or more minutes per day.  She exercises with enough effort to increase her heart rate 4 days per week.   She is taking medications regularly.                   Objective           Vitals:  No vitals were obtained today due to virtual visit.    Physical Exam   GENERAL: alert and no distress  EYES: Eyes grossly normal to inspection.  No discharge or erythema, or obvious scleral/conjunctival abnormalities.  RESP: No audible wheeze, cough, or visible cyanosis.    SKIN: Visible skin clear. No significant rash, abnormal pigmentation or lesions.  NEURO: Cranial nerves grossly intact.  Mentation and speech appropriate for age.  PSYCH: Appropriate affect, tone, and pace of words          Video-Visit Details    Type of service:  Video Visit   Video End Time: 445  Originating Location (pt. Location): Home    Distant Location (provider location):  On-site  Platform used for Video Visit: Doxlili  Signed Electronically by: Kaushik Hobbs CNP

## 2025-02-03 NOTE — PROGRESS NOTES
[facility]                                     Progress Note    Patient Name: Randi Cleary  Date: 2/3/2025       Service Type: Individual      Session Start Time: 1:00  Session End Time: 1:59     Session Length: 53+    Session #: 16    Attendees: Client    Service Modality:  Video Visit:      Provider verified identity through the following two step process.  Patient provided:  Patient  and Patient address    Telemedicine Visit: The patient's condition can be safely assessed and treated via synchronous audio and visual telemedicine encounter.      Reason for Telemedicine Visit: Patient has requested telehealth visit    Originating Site (Patient Location): Patient's home    Distant Site (Provider Location): Provider Remote Setting- Home Office    Consent:  The patient/guardian has verbally consented to: the potential risks and benefits of telemedicine (video visit) versus in person care; bill my insurance or make self-payment for services provided; and responsibility for payment of non-covered services.     Patient would like the video invitation sent by:  My Chart    Mode of Communication:  Video Conference via [employer]well    Distant Location (Provider):  Off-site    As the provider I attest to compliance with applicable laws and regulations related to telemedicine.    DATA  Interactive Complexity: No  Crisis: No        Progress Since Last Session (Related to Symptoms / Goals / Homework):   Symptoms: Improving      Homework:  n/a      Episode of Care Goals: Satisfactory progress - ACTION (Actively working towards change); Intervened by reinforcing change plan / affirming steps taken     Current / Ongoing Stressors and Concerns:   Reflected on issue that occurred during last appointment.  Awareness of family of origin beliefs and differences with the families of those close to her.  Reflecting on the ways in which her upbringing and events have shaped her current functioning and,  specifically, her approach to finances.       Treatment Objective(s) Addressed in This Session:   Distress tolerance     Intervention:   Validation, safety assessment.  Encouragement.  Small steps toward larger goals      Assessments completed prior to visit:  The following assessments were completed by patient for this visit:  PHQ9:       12/10/2024     9:24 AM 12/17/2024     9:11 AM 12/23/2024     7:30 AM 1/2/2025     2:05 PM 1/23/2025     8:51 AM 1/28/2025     9:34 AM 2/3/2025    11:13 AM   PHQ-9 SCORE   PHQ-9 Total Score MyChart 15 (Moderately severe depression) 15 (Moderately severe depression) 0 24 (Severe depression) 15 (Moderately severe depression) 15 (Moderately severe depression) 11 (Moderate depression)   PHQ-9 Total Score 15  15  15 24  15  15  11        Patient-reported     GAD7:       9/24/2024     9:54 AM 10/8/2024     6:29 AM 10/22/2024     8:59 AM 11/4/2024     2:38 PM 12/3/2024     9:10 AM 12/17/2024     9:13 AM 1/23/2025     8:47 AM   CHAVO-7 SCORE   Total Score 9 (mild anxiety) 9 (mild anxiety) 11 (moderate anxiety) 10 (moderate anxiety) 17 (severe anxiety) 13 (moderate anxiety) 11 (moderate anxiety)   Total Score 9 9    9 11    11 10  17  13  11        Patient-reported     CAGE-AID:       6/22/2020     7:12 PM 1/18/2022    11:15 PM 6/6/2024     8:00 AM   CAGE-AID Total Score   Total Score 4 4 4   Total Score MyChart 4 (A total score of 2 or greater is considered clinically significant) 4 (A total score of 2 or greater is considered clinically significant)      PROMIS 10-Global Health (all questions and answers displayed):       10/30/2024     8:48 AM 11/6/2024    12:54 PM 11/14/2024    11:35 AM 11/26/2024     5:00 PM 12/4/2024     9:28 AM 12/18/2024     9:10 AM 12/30/2024     5:44 PM   PROMIS 10   In general, would you say your health is: Fair Fair Fair  Good Fair Fair   In general, would you say your quality of life is: Poor Poor Poor  Poor Fair Poor   In general, how would you rate your  physical health? Poor Good Fair  Good Fair Fair   In general, how would you rate your mental health, including your mood and your ability to think? Fair Fair Fair  Fair Fair Poor   In general, how would you rate your satisfaction with your social activities and relationships? Fair Poor Poor  Poor Poor Poor   In general, please rate how well you carry out your usual social activities and roles Poor Poor Fair  Poor Poor Poor   To what extent are you able to carry out your everyday physical activities such as walking, climbing stairs, carrying groceries, or moving a chair? Moderately Moderately Moderately  Moderately Moderately Moderately   In the past 7 days, how often have you been bothered by emotional problems such as feeling anxious, depressed, or irritable? Often Often Often  Often Often Often   In the past 7 days, how would you rate your fatigue on average? Moderate Moderate Moderate  Moderate Moderate Moderate   In the past 7 days, how would you rate your pain on average, where 0 means no pain, and 10 means worst imaginable pain? 7 7 7  8 7 8   In general, would you say your health is: 2 2 2  3 2 2   In general, would you say your quality of life is: 1 1 1  1 2 1   In general, how would you rate your physical health? 1 3 2  3 2 2   In general, how would you rate your mental health, including your mood and your ability to think? 2 2 2  2 2 1   In general, how would you rate your satisfaction with your social activities and relationships? 2 1 1  1 1 1   In general, please rate how well you carry out your usual social activities and roles. (This includes activities at home, at work and in your community, and responsibilities as a parent, child, spouse, employee, friend, etc.) 1 1 2  1 1 1   To what extent are you able to carry out your everyday physical activities such as walking, climbing stairs, carrying groceries, or moving a chair? 3 3 3  3 3 3   In the past 7 days, how often have you been bothered by  emotional problems such as feeling anxious, depressed, or irritable? 4 4 4  4 4 4   In the past 7 days, how would you rate your fatigue on average? 3 3 3  3 3 3   In the past 7 days, how would you rate your pain on average, where 0 means no pain, and 10 means worst imaginable pain? 7 7 7  8 7 8   Global Mental Health Score 7  6  6   6  7  5    Global Physical Health Score 9  11  10   11  10  10    PROMIS TOTAL - SUBSCORES 16  17  16   17  17  15        Information is confidential and restricted. Go to Review Flowsheets to unlock data.    Patient-reported     [place] Suicide Severity Rating Scale (Lifetime/Recent)      6/11/2020    10:00 AM 1/9/2023     1:00 PM 5/21/2024     4:49 PM 5/21/2024     9:00 PM 6/6/2024     8:00 AM 7/10/2024    12:00 PM 11/22/2024    11:05 AM   [place] Suicide Severity Rating (Lifetime/Recent)   Q1 Wish to be Dead (Lifetime) Yes   Yes      Comments when patient was in treatment and there were family concerns   OD on medications      Q2 Non-Specific Active Suicidal Thoughts (Lifetime) Yes   No      Most Severe Ideation Rating (Lifetime) 5   5      Most Severe Ideation Description (Lifetime) when family problems were happening   OD on medication      Frequency (Lifetime) --   3      Duration (Lifetime) --   3      Controllability (Lifetime) 0   2      Protective Factors  (Lifetime) 5   4      Reasons for Ideation (Lifetime) --   5      Q1 Wished to be Dead (Past Month)  yes 1-->yes 1-->yes 1-->yes  0-->no   Q2 Suicidal Thoughts (Past Month)  no 1-->yes 1-->yes 1-->yes  0-->no   Q3 Suicidal Thought Method  no 0-->no 0-->no 1-->yes     Q4 Suicidal Intent without Specific Plan  no 1-->yes 0-->no 0-->no     Q5 Suicide Intent with Specific Plan  no 0-->no 0-->no 1-->yes     Q6 Suicide Behavior (Lifetime)  yes 1-->yes 0-->no 1-->yes  0-->no   If yes to Q6, within past 3 months?  no 1-->yes 0-->no 0-->no     Level of Risk per Screen  moderate risk high risk moderate risk high risk  no risks  "indicated   RETIRED: 1. Wish to be Dead (Recent) No         RETIRED: 2. Non-Specific Active Suicidal Thoughts (Recent) No         3. Active Suicidal Ideation with any Methods (Not Plan) Without Intent to Act (Lifetime) Yes         RETIRE: Active Suicidal Ideation with any Methods (Not Plan) Description (Lifetime) 30 years prior         RETIRE: 4. Active Suicidal Ideation with Some Intent to Act, Without Specific Plan (Lifetime) Yes         RETIRE: 5. Active Suicidal Ideation with Specific Plan and Intent (Lifetime) Yes         Actual Attempt (Lifetime) Yes         Actual Attempt Description (Lifetime) patient reported overdosing on Prozac about thirty years ago         Total Number of Actual Attempts (Lifetime) 1         Actual Attempt (Past 3 Months) No         Has subject engaged in non-suicidal self-injurious behavior? (Lifetime) Yes         Has subject engaged in non-suicidal self-injurious behavior? (Past 3 Months) No         Interrupted Attempts (Lifetime) No         Interrupted Attempts (Past 3 Months) No         Aborted or Self-Interrupted Attempt (Lifetime) No         Aborted or Self-Interrupted Attempt (Past 3 Months) No         Preparatory Acts or Behavior (Lifetime) No         Preparatory Acts or Behavior (Past 3 Months) No         Most Recent Attempt Date --         Comments 30 years prior         Most Recent Attempt Actual Lethality Code 0         1. Wish to be Dead (Lifetime)      N    2. Non-Specific Active Suicidal Thoughts (Lifetime)      N          ASSESSMENT: Current Emotional / Mental Status (status of significant symptoms):   Risk status (Self / Other harm or suicidal ideation)   Patient denies current fears or concerns for personal safety.   Patient reports the following current or recent suicidal ideation or behaviors: she reports \"I might as well just commit suicide.\" Patient states she does not want to live anymore, \"no one wants me,\" \"my cat doesn't love me anyway,\" and \"my whole life has " "been taken away.\".   Patient denies current or recent homicidal ideation or behaviors.   Patient denies current or recent self injurious behavior or ideation.   Patient denies other safety concerns.   Patient reports there has been no change in risk factors since their last session.     Patient reports there has been no change in protective factors since their last session.     A safety and risk management plan has been developed including: Patient consented to co-developed safety plan on 6/4/24.  Safety and risk management plan was reviewed.   Patient agreed to use safety plan should any safety concerns arise.  A copy was made available to the patient.     Appearance:   Appropriate    Eye Contact:   Good    Psychomotor Behavior: Normal    Attitude:   Friendly Pleasant   Orientation:   All   Speech    Rate / Production: Slow     Volume:  Normal    Mood:    Anxious  Sad    Affect:    Subdued    Thought Content:  Clear    Thought Form:  Goal Directed    Insight:    Good      Medication Review:   No changes to current psychiatric medication(s)     Medication Compliance:   Yes     Changes in Health Issues:   None reported     Chemical Use Review:   Substance Use: Chemical use reviewed, no active concerns identified      Tobacco Use: No current tobacco use.      Diagnosis:  1. 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 Generalized anxiety disorder        Collateral Reports Completed:   Not Applicable    PLAN: (Patient Tasks / Therapist Tasks / Other)  Continue following safety plan.  Focus on small tasks.  Self-care.  She wants to reflect on early memories and how much she \"can rely on them.\"      Erika Colorado, EWELINA                                                         ______________________________________________________________________    Individual Treatment Plan    Patient's Name: Randi Cleary  YOB: 1955    Date of Creation: 7/17/2024  Date " Treatment Plan Last Reviewed/Revised: 7/17/2024, 10/30/24, 2/3/2025    DSM5 Diagnoses:   296.33 (F33.2) Major Depressive Disorder, Recurrent Episode, Severe   300.02 (F41.1) Generalized Anxiety Disorder  Psychosocial / Contextual Factors: history of trauma  PROMIS (reviewed every 90 days):     Referral / Collaboration:  Discussed and patient will pursue a therapy group for depressive symptoms.    Anticipated number of session for this episode of care: 9-12 sessions  Anticipation frequency of session: Weekly  Anticipated Duration of each session: 38-52 minutes  Treatment plan will be reviewed in 90 days or when goals have been changed.       MeasurableTreatment Goal(s) related to diagnosis / functional impairment(s)  Goal 1: Client will keep self safe and eliminate suicidal ideation and self-harm behaviors.    Objective #A (Client Action)    Client will make a list of at least 10 skills or activities that you will to use to distract from urges to harm self.  Status: Completed - Date: 10/30/24       Intervention(s)  Therapist will provide ideas and strategies for generating coping skills list.    Objective #B  Client will make a list of pros and cons for tolerating and not tolerating an urge to harm self.  Status: Continued - Date(s): 2/3/2025    Intervention(s)  Therapist will guide client through DBT-style Pros/Cons list for behavior change.    Objective #C  Client will identify and practice the new strategies for dealing with strong emotions, learn and practice relaxation breathing.  Status: Continued - Date(s): 2/03/25      Intervention(s)  Therapist will teach distraction skills. Practice them within session and outside of session.    Goal 2: Client will report reduction in anxiety also as evidenced by reduction of CHAVO-7 score below 6 points within the next 12 weeks.    Objective #A (Client Action)    Client will identify at least three triggers for anxiety.  Status: Completed - Date: 10/30/24        Intervention(s)  Therapist will provide educational materials on common triggers and signs of anxiety.    Objective #B  Client will use relaxation strategies at least two times per day to reduce the physical symptoms of anxiety.  Status: Continued - Date(s): 2/3/2025    Intervention(s)  Therapist will teach relaxation strategies such as mindfulness, deep breathing, muscle relaxation, and sensory activities.    Objective #C  Client will use cognitive strategies identified in therapy to challenge anxious thoughts.  Status: Continued - Date(s): 2/3/2025    Intervention(s)  Therapist will teach strategies for cognitive modification using REBT model.    Goal 3: Client will report improved mood as indicated by PHQ-9 score below 6 for consistent 8 weeks.    Objective #A (Client Action)    Status: Continued - Date: 2/3/2025    Client will Increase interest, engagement, and pleasure in doing things.    Intervention(s)  Therapist will assign homework for daily involvement in pleasant activities.    Objective #B  Client will Identify negative self-talk and behaviors: challenge core beliefs, myths, and actions.    Status: Continued - Date(s): 2/3/2025    Intervention(s)  Therapist will teach strategies for cognitive modification using REBT models.    Objective #C  Client will Improve quantity and quality of night time sleep / decrease daytime naps.  Status: Continued - Date(s): 2/3/2025    Intervention(s)  Therapist will teach sleep hygiene strategies.  Assign homework for daily practice.    Goal 4: Client will report reduced or eliminated physiological activation when recalling a distressing event including decreased re-experiencing, decreased avoidance, and decreased hyperarousal.    Objective #A (Client Action)    Status:  Continued: 2/3/25       Client will acquire knowledge of trauma, common symptoms post-trauma, emotion regulation strategies, stress management strategies, and cognitive coping  strategies.    Intervention(s)  Therapist will teach about effects of trauma on mind and body; encourage emotion identification and expression; practice with client the stress management strategies; and teach cognitive modification.    Objective #B  Client will use Brainspotting while focusing on physiological sensations related to distressing events.  Client will assess and rate level of physiological activation.  Status: Continued - Date(s): 2/3/2025    Intervention(s)  Therapist will provide use a pointer or other materials to assist client in holding a brainspot in their visual field.  Therapist might also provide biolateral music through headphones to deepen the experience.         Patient has reviewed and agreed to the above plan.      Erika Colorado, LMFT  July 17, 2024

## 2025-02-05 ENCOUNTER — ANESTHESIA EVENT (OUTPATIENT)
Dept: BEHAVIORAL HEALTH | Facility: CLINIC | Age: 70
End: 2025-02-05

## 2025-02-05 ENCOUNTER — ANESTHESIA (OUTPATIENT)
Dept: BEHAVIORAL HEALTH | Facility: CLINIC | Age: 70
End: 2025-02-05
Payer: MEDICARE

## 2025-02-05 ENCOUNTER — HOSPITAL ENCOUNTER (OUTPATIENT)
Dept: BEHAVIORAL HEALTH | Facility: CLINIC | Age: 70
Discharge: HOME | End: 2025-02-05
Attending: PSYCHIATRY & NEUROLOGY
Payer: MEDICARE

## 2025-02-05 VITALS
RESPIRATION RATE: 17 BRPM | SYSTOLIC BLOOD PRESSURE: 140 MMHG | TEMPERATURE: 97.8 F | DIASTOLIC BLOOD PRESSURE: 79 MMHG | HEART RATE: 80 BPM | OXYGEN SATURATION: 93 %

## 2025-02-05 DIAGNOSIS — F33.2 SEVERE RECURRENT MAJOR DEPRESSION WITHOUT PSYCHOTIC FEATURES (H): Primary | ICD-10-CM

## 2025-02-05 PROCEDURE — 250N000011 HC RX IP 250 OP 636: Performed by: PSYCHIATRY & NEUROLOGY

## 2025-02-05 PROCEDURE — 90870 ELECTROCONVULSIVE THERAPY: CPT

## 2025-02-05 PROCEDURE — 250N000009 HC RX 250: Performed by: ANESTHESIOLOGY

## 2025-02-05 PROCEDURE — 370N000017 HC ANESTHESIA TECHNICAL FEE, PER MIN: Performed by: ANESTHESIOLOGY

## 2025-02-05 RX ORDER — KETOROLAC TROMETHAMINE 30 MG/ML
30 INJECTION, SOLUTION INTRAMUSCULAR; INTRAVENOUS ONCE
Start: 2025-02-05 | End: 2025-02-05

## 2025-02-05 RX ORDER — METHOHEXITAL IN WATER/PF 100MG/10ML
SYRINGE (ML) INTRAVENOUS PRN
Status: DISCONTINUED | OUTPATIENT
Start: 2025-02-05 | End: 2025-02-05

## 2025-02-05 RX ORDER — NALOXONE HYDROCHLORIDE 0.4 MG/ML
0.1 INJECTION, SOLUTION INTRAMUSCULAR; INTRAVENOUS; SUBCUTANEOUS
Status: DISCONTINUED | OUTPATIENT
Start: 2025-02-05 | End: 2025-02-06 | Stop reason: HOSPADM

## 2025-02-05 RX ORDER — DEXAMETHASONE SODIUM PHOSPHATE 4 MG/ML
4 INJECTION, SOLUTION INTRA-ARTICULAR; INTRALESIONAL; INTRAMUSCULAR; INTRAVENOUS; SOFT TISSUE
Status: DISCONTINUED | OUTPATIENT
Start: 2025-02-05 | End: 2025-02-06 | Stop reason: HOSPADM

## 2025-02-05 RX ORDER — ETHACRYNIC ACID 25 MG/1
TABLET ORAL DAILY
COMMUNITY

## 2025-02-05 RX ORDER — ONDANSETRON 4 MG/1
4 TABLET, ORALLY DISINTEGRATING ORAL EVERY 30 MIN PRN
Status: DISCONTINUED | OUTPATIENT
Start: 2025-02-05 | End: 2025-02-06 | Stop reason: HOSPADM

## 2025-02-05 RX ORDER — KETOROLAC TROMETHAMINE 30 MG/ML
30 INJECTION, SOLUTION INTRAMUSCULAR; INTRAVENOUS ONCE
Status: COMPLETED | OUTPATIENT
Start: 2025-02-05 | End: 2025-02-05

## 2025-02-05 RX ORDER — SODIUM CHLORIDE, SODIUM LACTATE, POTASSIUM CHLORIDE, CALCIUM CHLORIDE 600; 310; 30; 20 MG/100ML; MG/100ML; MG/100ML; MG/100ML
INJECTION, SOLUTION INTRAVENOUS CONTINUOUS
Status: DISCONTINUED | OUTPATIENT
Start: 2025-02-05 | End: 2025-02-06 | Stop reason: HOSPADM

## 2025-02-05 RX ORDER — ONDANSETRON 2 MG/ML
4 INJECTION INTRAMUSCULAR; INTRAVENOUS EVERY 30 MIN PRN
Status: DISCONTINUED | OUTPATIENT
Start: 2025-02-05 | End: 2025-02-06 | Stop reason: HOSPADM

## 2025-02-05 RX ADMIN — KETOROLAC TROMETHAMINE 30 MG: 30 INJECTION, SOLUTION INTRAMUSCULAR at 10:24

## 2025-02-05 RX ADMIN — Medication 90 MG: at 10:40

## 2025-02-05 RX ADMIN — Medication 100 MG: at 10:39

## 2025-02-05 ASSESSMENT — COPD QUESTIONNAIRES: COPD: 1

## 2025-02-05 NOTE — PROCEDURES
"  Procedures  [facility], [place]   ECT Procedure Note   02/05/2025    Randi Cleary is a 68 year old female patient.  [id number]    Patient Status: outpatient    Is this the first in a series of 12 treatments?  No      Allergies   Allergen Reactions    Serotonin Reuptake Inhibitors (Ssris) Anxiety, Difficulty breathing, Headache, Palpitations and Shortness Of Breath    Buspirone      The patient states she had serotonin syndrome    Cephalexin      Other reaction(s): unknown rxn.    Desvenlafaxine      Serotonin syndrome    Diclofenac Sodium [Diclofenac]      Serotonin syndrome and restless legs syndrome    Gabapentin      Drove on the wrong side of the highway    Levofloxacin      \"CAN'T REMEMBER\"    Penicillins      \"SORES IN MOUTH\"    Riluzole Difficulty breathing and Swelling    Sulfa Antibiotics      \"PT DOES NOT KNOW WHAT THE REACTION WAS\"    Topiramate Other (See Comments)     Frequent urination    Dextromethorphan Anxiety       Weight:  0 lbs 0 oz / 0 kg          Indications for ECT:   Medications ineffective and Psychotherapies ineffective         Clinical Narrative:   HPI - The patient describes a lifelong history of depression dating back to elementary school, worsening after her father's death when she was 15yo and especially in the 1980s in the setting of worsening physical health (since falling and breaking her ankle), which led to the the initiation of fluoxetine, her first antidepressant.  Her history has been primarily characterized by low mood, with some ups and downs but no extended depression-free periods since then.  She has tried many different medications from different classes, but cannot recall any one being particularly helpful for her.  She has experienced past episodes of particularly irritable mood and concomitant decreased sleep need, although most of these have lasted 1-2 days and triggered by anger related to external stressors.  She has at least one " "episode of a more extended episode of elevated mood (and associated grandiosity, increased spending, flight of ideas, increased goal directed behaviors, pressured speech, and odd and embarrassing behavior), which occurred in the context of relapse on alcohol in 2021. She does not endorse any events before about age 48.     The patient's depression is characterized by near daily low mood, frequent anhedonia, with sleep difficulties (although c/b pain, KYAW, and RLS), fatigue, feelings of guilt/worthlessness, and impaired concentration.  She reports feelings of \"despair,\" at times with active SI with plan, but denies any intent to act on this - \"maybe it's hope.\"  She has 1 lifetime suicide attempt (overdose) in the 1980s, for which she was psychiatrically hospitalized.  She has a remote history of SIB (cutting) but not recently.       Psych pertinent item history includes includes suicide attempt , suicidal ideation, SIB , aggression, trauma hx, substance use: alcohol, cannabis, and Patient has a history of alcohol dependence treated 20+ years ago and she relapsed in 2020 for a couple of months, in her 20s she\" loved getting high\" on cocaine, LSD, mushrooms, speed, white cross, mutiple psychotropic trials , psych hosp, ketamine, and major medical problems.    Target Symptoms for Improvement: Amotivation, Fatigue, Improved self-image and Panic attacks         Diagnosis:   Major depression         Assessment:   #1 05/24/24 Some circumstantial thought, needs some redirection, 3.5 hours sleep last night, anxious, mood 1/10, PDW but no SI, never had ECT before - only TMS. No AVH.   #2 05/29/24  Mood 4/10, better over w/e, some word find difficulties, no AVH/SI/PDW. Chronic sciatica pain, neck and shoulder pain - didn't worsen with ECT. Mild headache.   #3 05/31/24  Mood 4/10, No SI, PDW, AVH, some wrist pain and headache, memory might be improving.    #4 6/3/24  Phq-9= 13, mood 3/10.  Yesterday was very tearful, still a " "bit today.  Last treatment had awareness under paralysis.  Otherwise, some initial confusion after first ECT but no subsequent cognitive effects.  Signed consent to continue.  #5 6/5/24 Mood 4-5/10  She feels like her \"rage\" is less and she feels lighter. but no cognitive side effects. She complains of excessive sweating and is concerned her thryoid is unbalanced. She is somatically focused She reports pain on the side of her neck radiating down to her chest. She had a migraine she thinks over the weekend which usual starts as occipital tension.  #6 6/7/24 mood 4-5/10. No SI. She does have some baseline long term memory difficulties no AVH.   #7 6/10/24  She still is worried that She is not able to go home. She had visitors this weekend who said \"you don't seem to have the weight of the world on your shoulders anymore\" she disagrees she had a downward spiral over the weekend when there was a mix up with her clothes and she usually feels tearful after ECT. She does not report anything feeling worse. She gets down on herself when she has an anxiety spiral and it was the 1st one she has had since admission.   #8 6/12/24 Winnie reported some tearfulness overnight. She is noticing a weight lifting mood 6/10 . Reports some difficulty with remembering names but believes this is at baseline.   #9 6/14/24 Mood 5/10 (10 best) She feels like she is improving each time . She still has occasional crying spells but she feels she bounces back quicker. She is still anxious about going home. No Si. Tolerating ECT  #10 06/17/24 mood 5/10 She does feellike she has gotten some improvement since starting Ect but still has times where she gets tearful and anxious \"goes down the rabit hole\" but not as often . She has had ketamine troches before with pain  management to no effect but wonders about ketamine infusions.  #11 06/19/24 Mood today is 5/10. Patient is wondering whether she could do a ketamine course (did have ketamine in the past), " "despite of being on opioids. Feels that ketamine can help with \"anger, tearing and anxiety.\" She complains to the anesthesiologist about recent urinary incontinence which needs to be considered when  using ketamine. She wants to try it for anger ,tearing and anxiety which is not a standard indication  #12 06/21/24 Mood 5/10, no PDW/SI, but some anxiety around pain this AM.  Patient is interested in maintenance ECT at her attending psychiatrist's recommendation to prevent the possibility of her mood dipping as low as it did previously that led to her hospitalization.  Discussed pros and cons of maintenance ECT, suggested alternative of maintenance medications/therapy and/or watchful waiting with possibility of retreatment should she relapse, as well as alternate interventions including ketamine.  At the moment, she is most interested in pursuing maintenance ECT - will plan for next Friday pending confirmation with her family that she has post-ECT ride and monitoring.  M1 06/28/24 Mood ups and downs, irritability, anxiety, sadness switching multiple times a day since being discharged home.  Had difficulty with the transition home, was harder than she thought it would be.  However, still able to \"push away\" PDW/SI, and did not have periods of persistently low mood this past week.  Has noticed some anterograde and retrograde amnesia of the 1-2 months prior to starting ECT.  Will continue to track.  Today, mood 6/10 \"now that I'm at ECT.\"  M2 07/05/24 She was tearful, states that she doesn't feel good, \"starting to drop\", states that the mood change happened in Wednesday somewhat suddenly, when she encountered several business stressors and felt overwhelmed. Mood 3-4/10. Denies SI and feels safe at home. Still reports memory issues. Reports that the day program has been overwhelming.    She cancelled her colonoscopy in order to focus on her right heart cath the next week. She feels like she has too many procedures " scheduled and pushed off her sleep study also.  M3 7/12/24 Doing well.  Somewhat stressed witht all the medical appointments she has to coordinate. Her colonoscopy got cancelled because of her upcoming appointment with cardiology. Canceled the outpatient program but interested in doing the group therapy at [facility].   M4 7/19/24 Doing well. Less frustrated and started therapy. Reports short term memory impairment. That said, she is hesitant to space ECT to i0wsfno  M5 8/2/24 Mood 5-6/10 she struggles some with the interval felling more irritable and tearful the second week. She felt some Si yesterday because she was frustrated and overehwlemd but no SI today. Cogntiively processing speed is affected and does better if she can get a hint. Encouraged her to take her viibryd as prescribed  M6 08/16/24  She is having headaches she attributes to the viibryd and encouraged her to adhere. She reports she still has lability between session lots of stressors with medical billing errors and feeling like she is hounded by collections mood 4/10. Tolerating Ect without complaints of cognitive side effects. Spent birthday in still water   M7 09/04/24  called earlier today with plan to cancel because she was feeling overwhelmed and had poor sleep the night before. Encouraged her to attend . She was very stressed because her electricity was out for mo than a week and her internet is out now . She still gets many incorrect medical bills which are very stressful  some trouble with naming songs on the radio  M8 09/13/24 Winnie is focused on her upcoming shoulder surgery and wants to make sure we talk about ECT and her recovery period. Will meet with surgeon in October and plan surgery in november. Mood is struggling because insomnia is still a problem despite low dose of trazodone    M9 09/27/24  she discontinued her viibryd as she attributes insomnia to that medication. Encourages her to start Auvelity which is [facility] foundational  "antidepressant. She had a successful cath lab visit and went out to celebrate and ended up having a fall and hit her head and was worked-up in the ED.  She reports she was tearful yesterdya she is still having mood dips in between sessions so not comfortable spacing out until she is re-established on antidepressant  M10 10/11/24: Mood is doing relatively well but has had some difficulties recovering after the fall, difficulty breathing attributed to bruised rib.  Now has rash at site of COVID vaccine from Wed, warm and red c/f cellulitis.  Trying to start Auvelity in parts due to lack of coverage - hasn't happened yet.  Will continue r0boruk maintenance while stabilizing meds and awaiting ortho surgery.  Mood: 7/10 (10=best), PHQ-9: 9    M11 10/25/24: Patient is feeling overwhelmed by medical appointments and commitments, has had worsening irritability related to this.  Recognizes that her mood is still overall better, but these acute stressors lead to an up-and-down over the course of the week, and there have been more downs this week.  Started faux-velity, some headaches but hoping these will resolve.  She is concerned that she won't be able to make it 6 weeks after her ortho surgery without ECT, but will continue to discuss.  Denies PDW/SI - \"I've done a reassessment\" and recognizes these stressors make things hard but \"I used to love life.\"  Denies adverse effects other than insomnia night before treatment due to holding Neurontin - will ask about alternate sleep options.   M12 Mood 6-7/10 going to group. Got good news from right heart cath. She has word finding difficulty. Wants to take up Mahjong. Got out into her yard for the first time in a long while. No falls. She doesn't like abilify thinks it is causing tinnitus and jaw clenching but will try to keep for now. PHQ-9=11 QIDS=18  M13 11/22/24: Mood has been \"not great\" the last two weeks, in setting of poor sleep, acute psychosocial stressors, medications " "changes, and cutting down on cannabis.  Wants to talk to primary psychiatrist about better control for sleep.  Does still feel that clarity of thought and motivation remain high overall, ECT still helping.  Will keep q2week ECT for now, discussed trial at 3    Complicated by: new onset Afib, regular rate -> referred to ED  M14 12/13/24: Delayed scheduled visit last week due to need for Cardiac clearance following Afib from last treatment.  Saw cardiology, who cleared patient to continue without anticoagulation due to CHADSVASC = 2.  Today, mood started to drift about a week ago, most notably irritability, but not the worst it's been.  Denies PDW/SI.  She is anticipating her surgery 1/8/24, and would like to do a treatment in 2 weeks \"as usual\" and then ideally on 1/6/24 right before the surgery.  We will book next treatment in 2 weeks, but patient to call if she's doing well and okay to space additional week.  Mood: 6/10 (10=best), PHQ-9: 15      01/22/25  Returns today after prolonged interval because she needed repeat medical clearance due to multiple ED visits for falls and dizziness. She discontinued her oral antidepressants. She feels irritable anxious overwhelmed by medical complications. Cancelled her shoulder surgery. She blames serotonin for her dizziness and falls and gillespie snot want to take psychotropic medication she thinks ECT is the most helpful for her. Mood 2/10. Showered 3-4 x a week sitz bath and sink hair wash on other days, eating 2 meals a day managing meds but difficulty initing decluttering tasks as she is a hoarder. Passive SI \"Everything just feels so hard\" PHQ_9=12   M 02/05/25  She is anxious, struggling with early morning waking, but otherwise says ECT is doing well for her mood.  She wants to stay at 2 weeks.  No side effects, no SI.          Pause for the Cause:     Correct patient Yes   Correct procedure/laterality settings: Yes           Intra-Procedure Documentation:     ECT #: 28 "   Treatment number this series: M16   Total treatment number: 28     Type of ECT:  Right, unilateral ultrabrief    ECT Medications:    Toradol 30mg iv - for headache/myalgia     Brevital: 100 mg (incr from 90 mg as still awake)   Succinyl Choline: 90 mg    BP - per anesthesia team     ECT Strip Summary: RUL Titration #3: 38.4 mC   Energy Level:  384.0mC, 0.3 ms, 100 Hz, 8 sec, 800 mA     Motor Seizure Duration: 20 seconds  EEG Seizure Duration: 21 seconds      Complications: none      Plan:   - Plan for maintenance in 2 weeks    - Monitor depression severity with clinical assessment augmented with PHQ9 every other treatment  - Continue current medications    Discharge instructions:   -- Remember to NOT take gabapentin after 6pm the night before each treatment  -- During maintenance ECT, you can't drive for 24 hours after each treatment.      Dr ALPHONSO Beavers MD   Psychiatry

## 2025-02-05 NOTE — ANESTHESIA PREPROCEDURE EVALUATION
Anesthesia Pre-Procedure Evaluation    Patient: Randi Cleary   MRN: [id number] : 1955        Procedure :   Electroconvulsive Therapy       Past Medical History:   Diagnosis Date    Bipolar 2 disorder (H)     Breast cancer (H)     lumpectomy, radiation, chemo    Chronic pain syndrome     COPD (chronic obstructive pulmonary disease) (H)     asthma    Cord compression (H) 2021    Dizzy     Drug tolerance     opioid    Esophageal reflux     Fatigue     Generalized anxiety disorder     Graves disease     Hemochromatosis 2018    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; H63D not detected    History of breast cancer 2020    Formatting of this note might be different from the original. Created by Conversion  Replacement Utility updated for latest IMO load Formatting of this note might be different from the original. Created by Conversion  Replacement Utility updated for latest IMO load    History of corticosteroid therapy 2019    History of partial adrenalectomy 2019    History of pheochromocytoma 2019    Hx antineoplastic chemotherapy     Hx of radiation therapy     Hyperlipidaemia     Hypertension     Impaired fasting glucose     Injury of neck, whiplash 07/15/2021    Joint pain     KYAW (obstructive sleep apnea) 2016    Osteopenia     Pheochromocytoma, left 2017    laparoscopically removed    Postablative hypothyroidism 1995    Prediabetes 10/03/2019    by A1c    Psoriasis     Psoriatic arthropathy (H)     Pulmonary hypertension (H)     Right rotator cuff tear     RLS (restless legs syndrome)     on ropinorole    Sacroiliitis     Serotonin syndrome 2020    [facility] - While on desvenlafaxine 100mg    Snoring     Spinal stenosis     Status post coronary angiogram 10/03/2019    Urinary incontinence     Vitamin B 12 deficiency 2009    Vitamin D deficiency 2010      Past Surgical History:   Procedure Laterality Date    ARTHRODESIS ANKLE      ARTHROPLASTY ANKLE Right  6/29/2015    Procedure: ARTHROPLASTY ANKLE;  Surgeon: Jason Coughlin MD;  Location: [facility]    ARTHROPLASTY REVISION ANKLE Right 6/29/2015    Procedure: ARTHROPLASTY REVISION ANKLE;  Surgeon: Jason Coughlin MD;  Location: [facility]    BIOPSY BREAST      BREAST BIOPSY, CORE RT/LT      COLONOSCOPY      COLONOSCOPY N/A 2/25/2021    Procedure: COLONOSCOPY;  Surgeon: Guru Elke Tolbert MD;  Location:  GI    CV CORONARY ANGIOGRAM N/A 10/3/2019    Procedure: CV CORONARY ANGIOGRAM;  Surgeon: Bryce Pierre MD;  Location:  HEART CARDIAC CATH LAB    CV RIGHT HEART CATH MEASUREMENTS RECORDED N/A 10/3/2019    Procedure: CV RIGHT HEART CATH;  Surgeon: Bryce Pierre MD;  Location:  HEART CARDIAC CATH LAB    CV RIGHT HEART CATH MEASUREMENTS RECORDED N/A 9/25/2024    Procedure: Heart Cath Right Heart Cath;  Surgeon: George Becker MD;  Location:  HEART CARDIAC CATH LAB    ESOPHAGOSCOPY, GASTROSCOPY, DUODENOSCOPY (EGD), COMBINED N/A 2/25/2021    Procedure: ESOPHAGOGASTRODUODENOSCOPY, WITH BIOPSY;  Surgeon: Guru Elke Tolbert MD;  Location:  GI    EYE SURGERY  2021    HC REMOVE TONSILS/ADENOIDS,<11 Y/O      Description: Tonsillectomy With Adenoidectomy;  Recorded: 04/07/2010;    IR LUMBAR EPIDURAL STEROID INJECTION  10/26/2004    IR LUMBAR EPIDURAL STEROID INJECTION  11/16/2004    IR LUMBAR EPIDURAL STEROID INJECTION  12/21/2004    IR LUMBAR EPIDURAL STEROID INJECTION  6/8/2006    JOINT REPLACEMENT      LAMINOPLASTY CERVICAL POSTERIOR THREE+ LEVELS Left 12/21/2021    Procedure: CERVICAL 3-CERVICAL 6 LEFT OPEN DOOR LAMINOPLASTY AND LEFT CERVICAL 4-5 AND CERVICAL 6-7 POSTERIOR FORAMINOTOMY;  Surgeon: Angela Gregory MD;  Location: [facility] OR    LAPAROSCOPIC ADRENALECTOMY Left 08/02/2017    pheochromocytoma    LAPAROSCOPIC ADRENALECTOMY Left 8/2/2017    Procedure: LAPAROSCOPIC LEFT ADRENALECTOMY, ;  Surgeon: Gab Linares MD;  Location: [facility]  "OR;  Service:     LENGTHEN TENDON ACHILLES Right 2015    Procedure: LENGTHEN TENDON ACHILLES;  Surgeon: Jason Coughlin MD;  Location: [facility]    LUMPECTOMY BREAST      LUMPECTOMY BREAST Left 1994    MAMMOPLASTY REDUCTION Right 2015    [place]    MAMMOPLASTY REDUCTION Right     approx late     MASTECTOMY      left lumpectomy with axillary node dissection    MASTECTOMY MODIFIED RADICAL      OTHER SURGICAL HISTORY Right     reconstructive breast surgery    OTHER SURGICAL HISTORY      Adrenalectomy for pheochromocytoma    PA MASTECTOMY, MODIFIED RADICAL      Description: Modified Radical Mastectomy Left Breast;  Recorded: 2010;    REPAIR HAMMER TOE Right 2015    Procedure: REPAIR HAMMER TOE;  Surgeon: Jason Coughlin MD;  Location: [facility]    TONSILLECTOMY      TONSILLECTOMY & ADENOIDECTOMY      ZZC ARTHRODESIS,ANKLE,OPEN Right     Description: Ankle Arthrodesis;  Recorded: 2010;      Allergies   Allergen Reactions    Serotonin Reuptake Inhibitors (Ssris) Anxiety, Difficulty breathing, Headache, Palpitations and Shortness Of Breath    Buspirone      The patient states she had serotonin syndrome    Cephalexin      Other reaction(s): unknown rxn.    Desvenlafaxine      Serotonin syndrome    Diclofenac Sodium [Diclofenac]      Serotonin syndrome and restless legs syndrome    Gabapentin      Drove on the wrong side of the highway    Levofloxacin      \"CAN'T REMEMBER\"    Penicillins      \"SORES IN MOUTH\"    Riluzole Difficulty breathing and Swelling    Sulfa Antibiotics      \"PT DOES NOT KNOW WHAT THE REACTION WAS\"    Topiramate Other (See Comments)     Frequent urination    Dextromethorphan Anxiety      Social History     Tobacco Use    Smoking status: Former     Current packs/day: 0.00     Average packs/day: 2.5 packs/day for 29.2 years (72.9 ttl pk-yrs)     Types: Cigarettes     Start date: 1971     Quit date: 2000     Years since quittin.6     Passive exposure: " Current    Smokeless tobacco: Never   Substance Use Topics    Alcohol use: Not Currently     Comment: relapse 09/2021 sober       Wt Readings from Last 1 Encounters:   01/15/25 76.2 kg (168 lb)        Anesthesia Evaluation   Pt has had prior anesthetic.     History of anesthetic complications       ROS/MED HX  ENT/Pulmonary:     (+) sleep apnea,                         COPD,              Neurologic:       Cardiovascular:     (+)  hypertension- -   -  - -                                pulmonary hypertension,      METS/Exercise Tolerance:     Hematologic:       Musculoskeletal:       GI/Hepatic:     (+) GERD,     hiatal hernia,              Renal/Genitourinary:       Endo:     (+)          thyroid problem,     Obesity,       Psychiatric/Substance Use:       Infectious Disease:       Malignancy:       Other:               OUTSIDE LABS:  CBC:   Lab Results   Component Value Date    WBC 6.9 12/19/2024    WBC 4.4 11/23/2024    HGB 17.8 (H) 12/19/2024    HGB 14.9 11/23/2024    HCT 51.1 (H) 12/19/2024    HCT 43.2 11/23/2024     12/19/2024     11/23/2024     BMP:   Lab Results   Component Value Date     12/19/2024     11/23/2024    POTASSIUM 4.9 12/19/2024    POTASSIUM 3.6 11/23/2024    CHLORIDE 103 12/19/2024    CHLORIDE 110 (H) 11/23/2024    CO2 27 12/19/2024    CO2 25 11/23/2024    BUN 14.2 12/19/2024    BUN 12.0 11/23/2024    CR 0.65 12/19/2024    CR 0.64 11/23/2024    GLC 91 12/19/2024     (H) 11/23/2024     COAGS:   Lab Results   Component Value Date    PTT 34 12/13/2021    INR 0.99 11/23/2024     POC:   Lab Results   Component Value Date     (H) 02/25/2021     HEPATIC:   Lab Results   Component Value Date    ALBUMIN 4.5 12/19/2024    PROTTOTAL 7.7 12/19/2024    ALT 15 12/19/2024    AST 28 12/19/2024    ALKPHOS 75 12/19/2024    BILITOTAL 0.5 12/19/2024     OTHER:   Lab Results   Component Value Date    A1C 5.7 (H) 08/21/2024    LINDA 9.9 12/19/2024    MAG 1.9 11/22/2024    TSH  "0.40 11/22/2024    T4 1.49 03/29/2024    T3 114 01/18/2023    CRP <2.9 11/16/2021    SED 8 10/17/2023       Anesthesia Plan    ASA Status:  3    NPO Status:  NPO Appropriate    Anesthesia Type: General.     - Airway: Mask Only   Induction: Intravenous.           Consents            Postoperative Care            Comments:               Wilner Ahuja MD    I have reviewed the pertinent notes and labs in the chart from the past 30 days and (re)examined the patient.  Any updates or changes from those notes are reflected in this note.    Clinically Significant Risk Factors Present on Admission                             # Overweight: Estimated body mass index is 27.12 kg/m  as calculated from the following:    Height as of 1/15/25: 1.676 m (5' 6\").    Weight as of 1/15/25: 76.2 kg (168 lb).                "

## 2025-02-05 NOTE — PROGRESS NOTES
Pt has met discharge criteria. Vital signs stable. PIV removed without issue. Pt moved to discharge area via wheelchair.

## 2025-02-05 NOTE — ANESTHESIA CARE TRANSFER NOTE
Patient: Randi Cleary    Procedure: * No procedures listed *  Electroconvulsive Therapy    Diagnosis: * No pre-op diagnosis entered *  Diagnosis Additional Information: No value filed.    Anesthesia Type:   General     Note:    Oropharynx: oropharynx clear of all foreign objects  Level of Consciousness: drowsy  Oxygen Supplementation: room air      Dentition: dentition unchanged  Vital Signs Stable: post-procedure vital signs reviewed and stable  Report to RN Given: handoff report given  Patient transferred to: PACU    Handoff Report: Identifed the Patient, Identified the Reponsible Provider, Reviewed the pertinent medical history, Discussed the surgical course, Reviewed Intra-OP anesthesia mangement and issues during anesthesia, Set expectations for post-procedure period and Allowed opportunity for questions and acknowledgement of understanding      Vitals:  Vitals Value Taken Time   BP     Temp     Pulse     Resp     SpO2         Electronically Signed By: Wilner Ahuja MD  February 5, 2025  10:52 AM

## 2025-02-05 NOTE — DISCHARGE INSTRUCTIONS
ECT Discharge Instructions      Scheduling your next ECT appointment:     Our scheduling process has changed. The ECT staff will no longer be scheduling your appointment, it will be done by our central scheduling office.   You will receive a phone call from [facility] within 1 business day after your ECT treatment.   If you have questions about the appointment that was scheduled for you, or need to change it, please call the ECT office at [phone]    During your ECT series:    Do not drive for 24 hours after treatment.  If you will have more than one treatment within a week, no driving until 7 days after your last ECT treatment.  Do not drink alcohol or use street drugs (illicit drugs) while you are having treatments.  Do not make important decisions, including legal decisions.    After each treatment:    Get plenty of rest. A responsible adult must stay with your for at least 6 hours.  Avoid heavy or risky activities for 24 hours.  If you feel light-headed, sit for a few minutes before standing. Have someone help you get up.  If you have nausea (feel sick to your stomach): Drink only clear liquids such as apple juice, ginger ale, broth or 7UP, Be sure to drink plenty of liquids. Move to a normal diet as you feel able.   If you received Toradol, wait 6 hours before taking ibuprofen.  Call your doctor if:   You have a fever over 100F (37.7 C) (taken under the tongue), or a fever that last more than 24 hours.  Your IV site is red, swollen, very painful or is getting more tender.  You have nausea that gets very bad or does not improve.    If you have any symptoms after ECT, tell our staff before your next treatment.  The ECT Department can be reached at [phone].  The ECT Department is open Mondays, Wednesdays and Fridays from 7:00 AM to 2:00 PM.  Discharge instructions have been reviewed with the patients responsible adult verbally over the phone. A print out has been given to the patient with  discharge instructions to review along with instructions on their next appointment.     Toradol:     You had Toradol today at 1030am  which is a NSAID medication.  Do not take any Ibuprofen, Motrin, Aspirin, Advil, Aleve, Excedrin, or any other NSAID medication until after 430pm.  Questions,  check with your physician or pharmacist.    To speak to the ECT doctor at their clinic:  # [phone]      [facility] DROP OFF: Please come to the [facility] and check-in with Security before your ECT appointment.  The [facility] is located right next to the Adult Emergency Room.  The address is [address] [place] [address].    [facility] : After your appointment, you may be picked up at the [facility] entrance, which is located at [address] [place] [address].  [facility], about 1 block North of the [facility]    AVS reviewed by and transported by: Sidney

## 2025-02-05 NOTE — ANESTHESIA POSTPROCEDURE EVALUATION
Patient: Randi Cleary    Procedure: * No procedures listed *  Electroconvulsive Therapy    Anesthesia Type:  General    Note:  Disposition: Outpatient   Postop Pain Control: Uneventful            Sign Out: Well controlled pain   PONV: No   Neuro/Psych: Uneventful            Sign Out: Acceptable/Baseline neuro status   Airway/Respiratory: Uneventful            Sign Out: Acceptable/Baseline resp. status   CV/Hemodynamics: Uneventful            Sign Out: Acceptable CV status; No obvious hypovolemia; No obvious fluid overload   Other NRE: NONE   DID A NON-ROUTINE EVENT OCCUR?            Last vitals:  Vitals:    02/05/25 0947 02/05/25 1050 02/05/25 1100   BP: 130/74 (!) 153/75 130/80   Pulse: 87 78 85   Resp: 16 19 21   Temp: 36.9  C (98.4  F) 36.6  C (97.8  F) 37  C (98.6  F)   SpO2: 94% 97% 95%       Electronically Signed By: Wilner Ahuja MD  February 5, 2025  11:11 AM
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
## 2025-02-10 ENCOUNTER — VIRTUAL VISIT (OUTPATIENT)
Dept: PSYCHOLOGY | Facility: CLINIC | Age: 70
End: 2025-02-10
Payer: MEDICARE

## 2025-02-10 DIAGNOSIS — F33.2 MAJOR DEPRESSIVE DISORDER, RECURRENT SEVERE WITHOUT PSYCHOTIC FEATURES (H): Primary | ICD-10-CM

## 2025-02-10 DIAGNOSIS — F41.1 GENERALIZED ANXIETY DISORDER: ICD-10-CM

## 2025-02-10 PROCEDURE — 90837 PSYTX W PT 60 MINUTES: CPT | Mod: 95 | Performed by: MARRIAGE & FAMILY THERAPIST

## 2025-02-10 NOTE — PROGRESS NOTES
[facility]                                     Progress Note    Patient Name: Randi Cleary  Date: 2/10/2025       Service Type: Individual      Session Start Time: 1:02  Session End Time: 2:01     Session Length: 53+    Session #: 17    Attendees: Client    Service Modality:  Video Visit:      Provider verified identity through the following two step process.  Patient provided:  Patient  and Patient address    Telemedicine Visit: The patient's condition can be safely assessed and treated via synchronous audio and visual telemedicine encounter.      Reason for Telemedicine Visit: Patient has requested telehealth visit    Originating Site (Patient Location): Patient's home    Distant Site (Provider Location): Provider Remote Setting- Home Office    Consent:  The patient/guardian has verbally consented to: the potential risks and benefits of telemedicine (video visit) versus in person care; bill my insurance or make self-payment for services provided; and responsibility for payment of non-covered services.     Patient would like the video invitation sent by:  My Chart    Mode of Communication:  Video Conference via [employer]well    Distant Location (Provider):  Off-site    As the provider I attest to compliance with applicable laws and regulations related to telemedicine.    DATA  Interactive Complexity: No  Crisis: No        Progress Since Last Session (Related to Symptoms / Goals / Homework):   Symptoms: Improving      Homework:  n/a      Episode of Care Goals: Satisfactory progress - ACTION (Actively working towards change); Intervened by reinforcing change plan / affirming steps taken     Current / Ongoing Stressors and Concerns:   Distress regarding finances and concern about inaccurate charges. Planning to talk with psychiatry next week about medication plans and ECT.  Felt better after returning to ECT last week.  Some anxiety about having different care team a few sessions ago.        Treatment Objective(s) Addressed in This Session:   Distress tolerance     Intervention:   Validation, safety assessment.  Encouragement.  Small steps toward larger goals      Assessments completed prior to visit:  The following assessments were completed by patient for this visit:  PHQ9:       12/17/2024     9:11 AM 12/23/2024     7:30 AM 1/2/2025     2:05 PM 1/23/2025     8:51 AM 1/28/2025     9:34 AM 2/3/2025    11:13 AM 2/10/2025    11:46 AM   PHQ-9 SCORE   PHQ-9 Total Score MyChart 15 (Moderately severe depression) 0 24 (Severe depression) 15 (Moderately severe depression) 15 (Moderately severe depression) 11 (Moderate depression) 15 (Moderately severe depression)   PHQ-9 Total Score 15  15 24  15  15  11  15        Patient-reported     GAD7:       9/24/2024     9:54 AM 10/8/2024     6:29 AM 10/22/2024     8:59 AM 11/4/2024     2:38 PM 12/3/2024     9:10 AM 12/17/2024     9:13 AM 1/23/2025     8:47 AM   CHAVO-7 SCORE   Total Score 9 (mild anxiety) 9 (mild anxiety) 11 (moderate anxiety) 10 (moderate anxiety) 17 (severe anxiety) 13 (moderate anxiety) 11 (moderate anxiety)   Total Score 9 9    9 11    11 10  17  13  11        Patient-reported     CAGE-AID:       6/22/2020     7:12 PM 1/18/2022    11:15 PM 6/6/2024     8:00 AM   CAGE-AID Total Score   Total Score 4 4 4   Total Score MyChart 4 (A total score of 2 or greater is considered clinically significant) 4 (A total score of 2 or greater is considered clinically significant)      PROMIS 10-Global Health (all questions and answers displayed):       10/30/2024     8:48 AM 11/6/2024    12:54 PM 11/14/2024    11:35 AM 11/26/2024     5:00 PM 12/4/2024     9:28 AM 12/18/2024     9:10 AM 12/30/2024     5:44 PM   PROMIS 10   In general, would you say your health is: Fair Fair Fair  Good Fair Fair   In general, would you say your quality of life is: Poor Poor Poor  Poor Fair Poor   In general, how would you rate your physical health? Poor Good Fair  Good Fair Fair    In general, how would you rate your mental health, including your mood and your ability to think? Fair Fair Fair  Fair Fair Poor   In general, how would you rate your satisfaction with your social activities and relationships? Fair Poor Poor  Poor Poor Poor   In general, please rate how well you carry out your usual social activities and roles Poor Poor Fair  Poor Poor Poor   To what extent are you able to carry out your everyday physical activities such as walking, climbing stairs, carrying groceries, or moving a chair? Moderately Moderately Moderately  Moderately Moderately Moderately   In the past 7 days, how often have you been bothered by emotional problems such as feeling anxious, depressed, or irritable? Often Often Often  Often Often Often   In the past 7 days, how would you rate your fatigue on average? Moderate Moderate Moderate  Moderate Moderate Moderate   In the past 7 days, how would you rate your pain on average, where 0 means no pain, and 10 means worst imaginable pain? 7 7 7  8 7 8   In general, would you say your health is: 2 2 2  3 2 2   In general, would you say your quality of life is: 1 1 1  1 2 1   In general, how would you rate your physical health? 1 3 2  3 2 2   In general, how would you rate your mental health, including your mood and your ability to think? 2 2 2  2 2 1   In general, how would you rate your satisfaction with your social activities and relationships? 2 1 1  1 1 1   In general, please rate how well you carry out your usual social activities and roles. (This includes activities at home, at work and in your community, and responsibilities as a parent, child, spouse, employee, friend, etc.) 1 1 2  1 1 1   To what extent are you able to carry out your everyday physical activities such as walking, climbing stairs, carrying groceries, or moving a chair? 3 3 3  3 3 3   In the past 7 days, how often have you been bothered by emotional problems such as feeling anxious, depressed,  or irritable? 4 4 4  4 4 4   In the past 7 days, how would you rate your fatigue on average? 3 3 3  3 3 3   In the past 7 days, how would you rate your pain on average, where 0 means no pain, and 10 means worst imaginable pain? 7 7 7  8 7 8   Global Mental Health Score 7  6  6   6  7  5    Global Physical Health Score 9  11  10   11  10  10    PROMIS TOTAL - SUBSCORES 16  17  16   17  17  15        Information is confidential and restricted. Go to Review Flowsheets to unlock data.    Patient-reported     [place] Suicide Severity Rating Scale (Lifetime/Recent)      6/11/2020    10:00 AM 1/9/2023     1:00 PM 5/21/2024     4:49 PM 5/21/2024     9:00 PM 6/6/2024     8:00 AM 7/10/2024    12:00 PM 11/22/2024    11:05 AM   [place] Suicide Severity Rating (Lifetime/Recent)   Q1 Wish to be Dead (Lifetime) Yes   Yes      Comments when patient was in treatment and there were family concerns   OD on medications      Q2 Non-Specific Active Suicidal Thoughts (Lifetime) Yes   No      Most Severe Ideation Rating (Lifetime) 5   5      Most Severe Ideation Description (Lifetime) when family problems were happening   OD on medication      Frequency (Lifetime) --   3      Duration (Lifetime) --   3      Controllability (Lifetime) 0   2      Protective Factors  (Lifetime) 5   4      Reasons for Ideation (Lifetime) --   5      Q1 Wished to be Dead (Past Month)  yes 1-->yes 1-->yes 1-->yes  0-->no   Q2 Suicidal Thoughts (Past Month)  no 1-->yes 1-->yes 1-->yes  0-->no   Q3 Suicidal Thought Method  no 0-->no 0-->no 1-->yes     Q4 Suicidal Intent without Specific Plan  no 1-->yes 0-->no 0-->no     Q5 Suicide Intent with Specific Plan  no 0-->no 0-->no 1-->yes     Q6 Suicide Behavior (Lifetime)  yes 1-->yes 0-->no 1-->yes  0-->no   If yes to Q6, within past 3 months?  no 1-->yes 0-->no 0-->no     Level of Risk per Screen  moderate risk high risk moderate risk high risk  no risks indicated   RETIRED: 1. Wish to be Dead (Recent) No          RETIRED: 2. Non-Specific Active Suicidal Thoughts (Recent) No         3. Active Suicidal Ideation with any Methods (Not Plan) Without Intent to Act (Lifetime) Yes         RETIRE: Active Suicidal Ideation with any Methods (Not Plan) Description (Lifetime) 30 years prior         RETIRE: 4. Active Suicidal Ideation with Some Intent to Act, Without Specific Plan (Lifetime) Yes         RETIRE: 5. Active Suicidal Ideation with Specific Plan and Intent (Lifetime) Yes         Actual Attempt (Lifetime) Yes         Actual Attempt Description (Lifetime) patient reported overdosing on Prozac about thirty years ago         Total Number of Actual Attempts (Lifetime) 1         Actual Attempt (Past 3 Months) No         Has subject engaged in non-suicidal self-injurious behavior? (Lifetime) Yes         Has subject engaged in non-suicidal self-injurious behavior? (Past 3 Months) No         Interrupted Attempts (Lifetime) No         Interrupted Attempts (Past 3 Months) No         Aborted or Self-Interrupted Attempt (Lifetime) No         Aborted or Self-Interrupted Attempt (Past 3 Months) No         Preparatory Acts or Behavior (Lifetime) No         Preparatory Acts or Behavior (Past 3 Months) No         Most Recent Attempt Date --         Comments 30 years prior         Most Recent Attempt Actual Lethality Code 0         1. Wish to be Dead (Lifetime)      N    2. Non-Specific Active Suicidal Thoughts (Lifetime)      N          ASSESSMENT: Current Emotional / Mental Status (status of significant symptoms):   Risk status (Self / Other harm or suicidal ideation)   Patient denies current fears or concerns for personal safety.   Patient denies current or recent suicidal ideation or behaviors.   Patient denies current or recent homicidal ideation or behaviors.   Patient denies current or recent self injurious behavior or ideation.   Patient denies other safety concerns.   Patient reports there has been no change in risk factors since  their last session.     Patient reports there has been no change in protective factors since their last session.     A safety and risk management plan has been developed including: Patient consented to co-developed safety plan on 6/4/24.  Safety and risk management plan was reviewed.   Patient agreed to use safety plan should any safety concerns arise.  A copy was made available to the patient.     Appearance:   Appropriate    Eye Contact:   Good    Psychomotor Behavior: Normal    Attitude:   Friendly Pleasant   Orientation:   All   Speech    Rate / Production: Slow     Volume:  Normal    Mood:    Anxious  Sad    Affect:    Subdued    Thought Content:  Clear    Thought Form:  Goal Directed    Insight:    Good      Medication Review:   No changes to current psychiatric medication(s)     Medication Compliance:   Yes     Changes in Health Issues:   None reported     Chemical Use Review:   Substance Use: Chemical use reviewed, no active concerns identified      Tobacco Use: No current tobacco use.      Diagnosis:  1. 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 Generalized anxiety disorder      Collateral Reports Completed:   Not Applicable    PLAN: (Patient Tasks / Therapist Tasks / Other)  Continue following safety plan.  Focus on small tasks.  Self-care.        Erika Colorado, XOCHITLFT                                                         ______________________________________________________________________    Individual Treatment Plan    Patient's Name: Randi Cleary  YOB: 1955    Date of Creation: 7/17/2024  Date Treatment Plan Last Reviewed/Revised: 7/17/2024, 10/30/24, 2/3/2025    DSM5 Diagnoses:   296.33 (F33.2) Major Depressive Disorder, Recurrent Episode, Severe   300.02 (F41.1) Generalized Anxiety Disorder  Psychosocial / Contextual Factors: history of trauma  PROMIS (reviewed every 90 days):     Referral / Collaboration:  Discussed and patient will  pursue a therapy group for depressive symptoms.    Anticipated number of session for this episode of care: 9-12 sessions  Anticipation frequency of session: Weekly  Anticipated Duration of each session: 38-52 minutes  Treatment plan will be reviewed in 90 days or when goals have been changed.       MeasurableTreatment Goal(s) related to diagnosis / functional impairment(s)  Goal 1: Client will keep self safe and eliminate suicidal ideation and self-harm behaviors.    Objective #A (Client Action)    Client will make a list of at least 10 skills or activities that you will to use to distract from urges to harm self.  Status: Completed - Date: 10/30/24       Intervention(s)  Therapist will provide ideas and strategies for generating coping skills list.    Objective #B  Client will make a list of pros and cons for tolerating and not tolerating an urge to harm self.  Status: Continued - Date(s): 2/3/2025    Intervention(s)  Therapist will guide client through DBT-style Pros/Cons list for behavior change.    Objective #C  Client will identify and practice the new strategies for dealing with strong emotions, learn and practice relaxation breathing.  Status: Continued - Date(s): 2/10/25      Intervention(s)  Therapist will teach distraction skills. Practice them within session and outside of session.    Goal 2: Client will report reduction in anxiety also as evidenced by reduction of CHAVO-7 score below 6 points within the next 12 weeks.    Objective #A (Client Action)    Client will identify at least three triggers for anxiety.  Status: Completed - Date: 10/30/24       Intervention(s)  Therapist will provide educational materials on common triggers and signs of anxiety.    Objective #B  Client will use relaxation strategies at least two times per day to reduce the physical symptoms of anxiety.  Status: Continued - Date(s): 2/3/2025    Intervention(s)  Therapist will teach relaxation strategies such as mindfulness, deep  breathing, muscle relaxation, and sensory activities.    Objective #C  Client will use cognitive strategies identified in therapy to challenge anxious thoughts.  Status: Continued - Date(s): 2/3/2025    Intervention(s)  Therapist will teach strategies for cognitive modification using REBT model.    Goal 3: Client will report improved mood as indicated by PHQ-9 score below 6 for consistent 8 weeks.    Objective #A (Client Action)    Status: Continued - Date: 2/3/2025    Client will Increase interest, engagement, and pleasure in doing things.    Intervention(s)  Therapist will assign homework for daily involvement in pleasant activities.    Objective #B  Client will Identify negative self-talk and behaviors: challenge core beliefs, myths, and actions.    Status: Continued - Date(s): 2/3/2025    Intervention(s)  Therapist will teach strategies for cognitive modification using REBT models.    Objective #C  Client will Improve quantity and quality of night time sleep / decrease daytime naps.  Status: Continued - Date(s): 2/3/2025    Intervention(s)  Therapist will teach sleep hygiene strategies.  Assign homework for daily practice.    Goal 4: Client will report reduced or eliminated physiological activation when recalling a distressing event including decreased re-experiencing, decreased avoidance, and decreased hyperarousal.    Objective #A (Client Action)    Status:  Continued: 2/3/25       Client will acquire knowledge of trauma, common symptoms post-trauma, emotion regulation strategies, stress management strategies, and cognitive coping strategies.    Intervention(s)  Therapist will teach about effects of trauma on mind and body; encourage emotion identification and expression; practice with client the stress management strategies; and teach cognitive modification.    Objective #B  Client will use Brainspotting while focusing on physiological sensations related to distressing events.  Client will assess and rate  level of physiological activation.  Status: Continued - Date(s): 2/3/2025    Intervention(s)  Therapist will provide use a pointer or other materials to assist client in holding a brainspot in their visual field.  Therapist might also provide biolateral music through headphones to deepen the experience.         Patient has reviewed and agreed to the above plan.      Erika Colorado, LMFT  July 17, 2024

## 2025-02-11 DIAGNOSIS — E89.0 POSTABLATIVE HYPOTHYROIDISM: Primary | ICD-10-CM

## 2025-02-12 ENCOUNTER — OFFICE VISIT (OUTPATIENT)
Dept: ENDOCRINOLOGY | Facility: CLINIC | Age: 70
End: 2025-02-12
Payer: MEDICARE

## 2025-02-12 ENCOUNTER — LAB (OUTPATIENT)
Dept: LAB | Facility: CLINIC | Age: 70
End: 2025-02-12
Payer: MEDICARE

## 2025-02-12 VITALS
DIASTOLIC BLOOD PRESSURE: 80 MMHG | WEIGHT: 172 LBS | HEART RATE: 88 BPM | SYSTOLIC BLOOD PRESSURE: 134 MMHG | OXYGEN SATURATION: 100 % | HEIGHT: 65 IN | BODY MASS INDEX: 28.66 KG/M2

## 2025-02-12 DIAGNOSIS — Z86.018 HISTORY OF PHEOCHROMOCYTOMA: ICD-10-CM

## 2025-02-12 DIAGNOSIS — M85.9 LOW BONE DENSITY: ICD-10-CM

## 2025-02-12 DIAGNOSIS — Z78.0 POST-MENOPAUSAL: ICD-10-CM

## 2025-02-12 DIAGNOSIS — Z86.018 HISTORY OF PHEOCHROMOCYTOMA: Primary | ICD-10-CM

## 2025-02-12 DIAGNOSIS — E89.0 POSTABLATIVE HYPOTHYROIDISM: ICD-10-CM

## 2025-02-12 DIAGNOSIS — R73.03 PREDIABETES: ICD-10-CM

## 2025-02-12 LAB
EST. AVERAGE GLUCOSE BLD GHB EST-MCNC: 108 MG/DL
HBA1C MFR BLD: 5.4 %
T4 FREE SERPL-MCNC: 1.49 NG/DL (ref 0.9–1.7)
TSH SERPL DL<=0.005 MIU/L-ACNC: 0.5 UIU/ML (ref 0.3–4.2)

## 2025-02-12 PROCEDURE — 99215 OFFICE O/P EST HI 40 MIN: CPT

## 2025-02-12 PROCEDURE — G2211 COMPLEX E/M VISIT ADD ON: HCPCS

## 2025-02-12 PROCEDURE — 84439 ASSAY OF FREE THYROXINE: CPT | Performed by: PATHOLOGY

## 2025-02-12 PROCEDURE — 99417 PROLNG OP E/M EACH 15 MIN: CPT

## 2025-02-12 PROCEDURE — 83036 HEMOGLOBIN GLYCOSYLATED A1C: CPT | Performed by: NURSE PRACTITIONER

## 2025-02-12 PROCEDURE — 84443 ASSAY THYROID STIM HORMONE: CPT | Performed by: PATHOLOGY

## 2025-02-12 PROCEDURE — 99000 SPECIMEN HANDLING OFFICE-LAB: CPT | Performed by: PATHOLOGY

## 2025-02-12 PROCEDURE — 36415 COLL VENOUS BLD VENIPUNCTURE: CPT | Performed by: PATHOLOGY

## 2025-02-12 RX ORDER — ALENDRONATE SODIUM 70 MG/1
70 TABLET ORAL
Qty: 12 TABLET | Refills: 3 | Status: SHIPPED | OUTPATIENT
Start: 2025-02-12

## 2025-02-12 RX ORDER — LEVOTHYROXINE SODIUM 150 MCG
150 TABLET ORAL EVERY MORNING
Qty: 90 TABLET | Refills: 4 | Status: SHIPPED | OUTPATIENT
Start: 2025-02-12

## 2025-02-12 ASSESSMENT — PAIN SCALES - GENERAL: PAINLEVEL_OUTOF10: SEVERE PAIN (8)

## 2025-02-12 NOTE — PROGRESS NOTES
Endocrine  note    Attending Assessment/Plan :     Low bone density-- start alendronate  again discussed  Rx alendronate 70 mg/week  Next DXA 1/2026 and see me after     History of left sided pheochromocytoma, 2.8 cm, removed 8/17.  This has been presumed sporadic but it might not be.    24 hour urine when not sick      History of unilateral adrenalectomy; history of corticosteroid injection     Sleep apnea , prescribed  CPAP, not using it.    This could give us false positive on the pheo tests.     Pre/diabetes by HgbA1c.   Recommend continued monitoring 1-2 times/year.  Lifestyle diet and exercise     Hypothyroidism following radioiodine treatment for  Graves' .  Treat to high normal target TSH  . She is on synthroid brand 150 x 6.5/week .   Most recent TSH with a fib was lower than our target of high normal TSH .  Repeat labs today again show low normal TSH.    Rx was refilled at the present dose at the appt.   We could tweak the dose later.  I will propose this via [place] on follow up.     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  I am listing this so I don't forget about it.     63_ minutes spent on the date of the encounter doing chart review, history and exam, documentation and further activities as noted above.    Alee Coker MD    Chief complaint/ HISTORY OF PRESENT ILLNESS Winnie returns for follow up of hypothyroidism following 131I for Graves', history of left pheochromocytoma with unilateral adrenalectomy, prediabetes.  I last saw her 1/2024.   Since then, DXA showed low BMD but with FRAX 16.9/4%.  I recommended alendronate (which she is not on ).   At our last visit she was on Synthroid 150 mcg/day and she is now on 150 x 6.5/week. She already had labs in anticipation of this appt .     Other events in the last year  5/21-6/22/24 hospitalized [facility] psych with diagnosis major depressive disorder, recurrent, severe, with anxious distress  ECT therapy has been used , most recently  2/5/2025 11/22-11/23/24 hospitalized [facility] with A fib, likely paroxysmal, hypotension, pulmonary HTN  The a fib onset followed ECT    Relevant past endocrine history :   Graves' disease was treated with 131I x 2 many years ago.  She has been on LT4 since then.     Adrenal mass was lst noted in 2006.  At that time it was 1.5 cm.  On follow up imaging in 2017 it was 2.5 cm.  Laparoscopic left adrenalectomy  8/2/17 ([facility]) removed pheochromocytoma 2.8 cm.  .    Increased metanephrine noted 9/2020 labs was followed by normal  repeat plasma metanephrine 10/2020 and 24 hour urine metanephrine levels which were normal 11/2020.      Low bone density was noted on DXA      We have the following relevant labs  4/19/17 urine cortisol 17 mcg/24 hours (3.5-45); metanephrine 719 mcg/24 hours (normal < 400), normetanephrine 409 mcg/24 hours (< 900), total metanephrine 1128 mcg/24 hours (< 1300), NE 47 mcg/24 hours (15-80), EPI 18 mcg/24 hoiurs (< 21),  ( mcg/24 hours), 24 hour urine volume 2025 12/4/19 f normetanephrine 0.89, metanephrine 0.12  2/20/2020 metanephrine 0.12, normetanephrine 0.8  8/3/2020 Fe 51, transferrin saturation 16, 25OHD 68.2, B12 755  9/9/2020 metanephrine 0.11, normetaenprhine 0.9 ,  pg/ml (700-750 supine) , epi < 25, DA < 25  10/21/2020 labs aldosterone 16, metanephrine 0.11, normetanephrine 0.49, renin activity 1.8  10/30/2020 TSH 0.37, HbbA1c 6.1%, Ca 9.8, Na 140 K 4, ferritin 20  11/2/20202 24 hour urine metanephrine 46 mg/day (), normetanephrine  186 mcg/day (), volume 2700, urine creatinine 837 mg/day  12/4/2020  [facility] TSH 0.06, free t4 1.2, free T3 2.8, reverse T3 29.6 on LT4 175 mcg/day  5/14/2021 TSH 0.87, ferritin 66, Ca 9.1, glucose 127, creatinine 0.69 Hgba1c 5.4.   5/18/2021 TSH 1.31, cholesterol 180, HDL 78, , LDl 83, NTproBNP 66 on LT4 150 mc/gday  8/31/22 TSH 1.76  1/18/2023 TSh 1.58, free T4 1.46, T3 114 on Synthroid 150 mcg/day  7/14/23 [place] < 12,  HgbA1c 5.8, TSH 3.18  10/17/23 TSH 0.24, HgbA1c 5.7%, glucose 111, creatinine 0.67, Na 142, K 4.1, Ca 9.6,   12/29/23 weight 214 TSH 0.34, free T4 1.75   3/29/24 TSH 1.31, free T4 1.49  8/21/24 HgbA1c 5.7%  11/22/24 TSh 0.4 hospitalized for a fib  2/12/25 TSH 0.5, free T4 1.49  not discussed at appt     Imaging  5/15/1994 15 mCi 131I; prior 123I thyroid uptake scan: 69.1% 24 ilir ruprake; enlarged gland with prominent pyramidal lobe  12/7/1994: 123I thyroid uptake 52%  3/9/17 US thyroid ([facility])  6/16/17 MR abdomen ([facility]): 2.5 cm left adrenal nodule (was 1.5 11/9/06)  6/24/19 CTA chest: evidence for pulmonary artery HTN, hepatic steattosis;   1/22/24  use L1-L2  Lowest T-score -2.1 right femoral neck  L1-l2 bone gain 12.1%  Mean hip bone loss -7.7%   FRAX 16.9/4%      REVIEW OF SYSTEMS  Feeling really good now   Don't sleep and this leads to anxiety and this leads to depression   Tracks sleep   Using lavender oil for sleep - it works   Weight loss dramatic summer 2024 without effort - had no appetite then  ; weight got down to 161 and then gained 10#, want to lose it  Seeing dietician and [place] - wants to get on mediterranean diet -   Reading up on aging   I think I have sarcopenia -  no muscle; weak  No balance   Appetite is currently better   Cardiac: Dehydration brought on the a fib; haven't had it since ;' no palpitations  Respiratory: + mucous still ; not using CPAP   GI: negative   Not on alendronate  Getting ECT   [place] and hit head in ? January   Gout in hands  is painful   Cancelled shoulder surgery for torn rotator cuff   Hasn't had steroid injection within the last year   Using vibrating device on shoulder - it does help   No headaches  Anger and rage not as bad as when I had the pheo  Cannabis makes me feel better - reduces anxiety   Nocturia 2-6 times/night  Incontinence   Can't afford alendronate  Are there any supplemetns I can take?    Eye problems - upcoming eye appt    Past Medical  History  Past Medical History:   Diagnosis Date    Bipolar 2 disorder (H)     Breast cancer (H) 1986    lumpectomy, radiation, chemo    Chronic pain syndrome     COPD (chronic obstructive pulmonary disease) (H)     asthma    Cord compression (H) 12/21/2021    Dizzy     Drug tolerance     opioid    Esophageal reflux     Fatigue     Generalized anxiety disorder     Graves disease 1994    Hemochromatosis 02/14/2018    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; H63D not detected    History of corticosteroid therapy 11/19/2019    History of partial adrenalectomy 11/19/2019    Hx antineoplastic chemotherapy     Hx of radiation therapy     Hyperlipidaemia     Hypertension     Impaired fasting glucose 2017    Infection due to 2019 novel coronavirus 06/04/2021    Injury of neck, whiplash 07/15/2021    Joint pain     KYAW (obstructive sleep apnea) 2016    Osteopenia     Paroxysmal atrial fibrillation (H) 11/2024    Pheochromocytoma, left 08/02/2017    laparoscopically removed    Postablative hypothyroidism 1995    Prediabetes 10/03/2019    by A1c    Psoriasis     Psoriatic arthropathy (H)     Pulmonary hypertension (H)     Right rotator cuff tear     RLS (restless legs syndrome)     on ropinorole    Sacroiliitis     Serotonin syndrome 08/28/2020    [facility] - While on desvenlafaxine 100mg    Snoring     Spinal stenosis     Status post coronary angiogram 10/03/2019    Urinary incontinence     Vitamin B 12 deficiency 2009    Vitamin D deficiency 2010     Past Surgical History:   Procedure Laterality Date    ARTHRODESIS ANKLE      ARTHROPLASTY ANKLE Right 6/29/2015    Procedure: ARTHROPLASTY ANKLE;  Surgeon: Jason Coughlin MD;  Location: [facility]    ARTHROPLASTY REVISION ANKLE Right 6/29/2015    Procedure: ARTHROPLASTY REVISION ANKLE;  Surgeon: Jason Coughlin MD;  Location:  [facility]    BIOPSY BREAST      BREAST BIOPSY, CORE RT/LT      COLONOSCOPY      COLONOSCOPY N/A 2/25/2021    Procedure: COLONOSCOPY;  Surgeon: Guru Elek Tolbert  MD [place];  Location:  GI    CV CORONARY ANGIOGRAM N/A 10/3/2019    Procedure: CV CORONARY ANGIOGRAM;  Surgeon: Bryce Pierre MD;  Location:  HEART CARDIAC CATH LAB    CV RIGHT HEART CATH MEASUREMENTS RECORDED N/A 10/3/2019    Procedure: CV RIGHT HEART CATH;  Surgeon: Bryce Pierre MD;  Location:  HEART CARDIAC CATH LAB    CV RIGHT HEART CATH MEASUREMENTS RECORDED N/A 9/25/2024    Procedure: Heart Cath Right Heart Cath;  Surgeon: George Becker MD;  Location:  HEART CARDIAC CATH LAB    ESOPHAGOSCOPY, GASTROSCOPY, DUODENOSCOPY (EGD), COMBINED N/A 2/25/2021    Procedure: ESOPHAGOGASTRODUODENOSCOPY, WITH BIOPSY;  Surgeon: Guru Elke Tolbert MD;  Location:  GI    EYE SURGERY  2021    HC REMOVE TONSILS/ADENOIDS,<13 Y/O      Description: Tonsillectomy With Adenoidectomy;  Recorded: 04/07/2010;    IR LUMBAR EPIDURAL STEROID INJECTION  10/26/2004    IR LUMBAR EPIDURAL STEROID INJECTION  11/16/2004    IR LUMBAR EPIDURAL STEROID INJECTION  12/21/2004    IR LUMBAR EPIDURAL STEROID INJECTION  6/8/2006    JOINT REPLACEMENT      LAMINOPLASTY CERVICAL POSTERIOR THREE+ LEVELS Left 12/21/2021    Procedure: CERVICAL 3-CERVICAL 6 LEFT OPEN DOOR LAMINOPLASTY AND LEFT CERVICAL 4-5 AND CERVICAL 6-7 POSTERIOR FORAMINOTOMY;  Surgeon: Angela Gregory MD;  Location: [facility]    LAPAROSCOPIC ADRENALECTOMY Left 08/02/2017    pheochromocytoma    LAPAROSCOPIC ADRENALECTOMY Left 8/2/2017    Procedure: LAPAROSCOPIC LEFT ADRENALECTOMY, ;  Surgeon: Gab Linares MD;  Location: [facility] OR;  Service:     LENGTHEN TENDON ACHILLES Right 6/29/2015    Procedure: LENGTHEN TENDON ACHILLES;  Surgeon: Jason Coughlin MD;  Location: [facility]    LUMPECTOMY BREAST      LUMPECTOMY BREAST Left 1994    MAMMOPLASTY REDUCTION Right 01/13/2015    [place]    MAMMOPLASTY REDUCTION Right     approx late 2015/early2016    MASTECTOMY      left lumpectomy with axillary node dissection    MASTECTOMY  MODIFIED RADICAL      OTHER SURGICAL HISTORY Right     reconstructive breast surgery    OTHER SURGICAL HISTORY      Adrenalectomy for pheochromocytoma    [place] MASTECTOMY, MODIFIED RADICAL      Description: Modified Radical Mastectomy Left Breast;  Recorded: 04/07/2010;    REPAIR HAMMER TOE Right 6/29/2015    Procedure: REPAIR HAMMER TOE;  Surgeon: Jason Coughlin MD;  Location: [facility]    TONSILLECTOMY      TONSILLECTOMY & ADENOIDECTOMY      ZZC ARTHRODESIS,ANKLE,OPEN Right     Description: Ankle Arthrodesis;  Recorded: 04/07/2010;     Breast lumpectomy 1986  Breast lumpectomy 1994  Reconstruction on right breast to make more symmmetrical to left     Medications    Current Outpatient Medications   Medication Sig Dispense Refill    acetaminophen (TYLENOL) 325 MG tablet Take 325-650 mg by mouth every 6 hours as needed for mild pain.      albuterol (VENTOLIN HFA) 108 (90 Base) MCG/ACT inhaler Inhale 2 puffs into the lungs every 6 hours as needed for shortness of breath, wheezing or cough 18 g 11    alendronate (FOSAMAX) 70 MG tablet Take 1 tablet (70 mg) by mouth every 7 days. Take 60 minutes before am meal with 8 oz. water. Remain upright for 30 minutes. 12 tablet 3    benzonatate (TESSALON) 100 MG capsule Take 1 capsule (100 mg) by mouth 3 times daily as needed for cough. 90 capsule 1    Cyanocobalamin (VITAMIN B-12) 5000 MCG SUBL Place 2-3 sprays under the tongue every morning. Unknown dose. 2 or 3 sprays/day      ethacrynic acid (EDECRIN) 25 MG tablet Take by mouth daily.      Fluticasone-Umeclidin-Vilant (TRELEGY ELLIPTA) 100-62.5-25 MCG/ACT oral inhaler Inhale 1 puff into the lungs daily. 60 each 3    gabapentin (NEURONTIN) 100 MG capsule Take 2 capsules (200 mg) by mouth at bedtime. May also take 1 capsule (100 mg) 2 times daily as needed for other (anxiety). Do not use the night before ECT..      ketoconazole (NIZORAL) 2 % external shampoo Apply topically daily as needed.      KLOR-CON M20 20 MEQ CR tablet  Take 1 tablet (20 mEq) by mouth every morning. 90 tablet 3    Lavender Oil 80 MG CAPS Take 160 mg by mouth at bedtime.      MAGNESIUM PO Take 2 capsules by mouth 2 times daily. Strength unknown      medical cannabis (Patient's own supply) See Admin Instructions (The purpose of this order is to document that the patient reports taking medical cannabis.  This is not a prescription, and is not used to certify that the patient has a qualifying medical condition.)  Flower      melatonin 1 MG CAPS Take 1 mg by mouth at bedtime.      naloxone (NARCAN) 4 MG/0.1ML nasal spray Spray 1 spray (4 mg) into one nostril alternating nostrils as needed for opioid reversal every 2-3 minutes until assistance arrives 0.2 mL 0    omeprazole (PRILOSEC) 20 MG DR capsule Take 1 capsule (20 mg) by mouth as needed. 90 capsule 3    oxyCODONE (ROXICODONE) 5 MG tablet Take 1 tablet (5 mg) by mouth 5 times daily. May dispense 02/04/25 for start 02/06/2025 70 tablet 0    rOPINIRole (REQUIP) 2 MG tablet Take 1 tablet (2 mg) by mouth 3 times daily. 270 tablet 3    STATIN NOT PRESCRIBED (INTENTIONAL) Please choose reason not prescribed from choices below.      SYNTHROID 150 MCG tablet Take 1 tablet (150 mcg) by mouth every morning. MON to SAT 1 tablet/day; SUN 0.5 tablet - confirmed dosing with patient 11/22      UNABLE TO FIND Take 1 tablet by mouth every morning. MEDICATION NAME: CETYL-MYRISFOLEATE      vitamin C (ASCORBIC ACID) 1000 MG TABS Take 1,000 mg by mouth every morning.      vitamin D3 (CHOLECALCIFEROL) 250 mcg (78598 units) capsule Take 1 capsule by mouth once a week. SUNDAY'S      guaiFENesin (MUCINEX) 600 MG 12 hr tablet Take 600 mg by mouth every morning. (Patient not taking: Reported on 2/12/2025)       Oxycodone is 1 tablet 4 times/day  , some days 5     Allergies  Allergies   Allergen Reactions    Serotonin Reuptake Inhibitors (Ssris) Anxiety, Difficulty breathing, Headache, Palpitations and Shortness Of Breath    Buspirone      The  "patient states she had serotonin syndrome    Cephalexin      Other reaction(s): unknown rxn.    Desvenlafaxine      Serotonin syndrome    Diclofenac Sodium [Diclofenac]      Serotonin syndrome and restless legs syndrome    Gabapentin      Drove on the wrong side of the highway    Levofloxacin      \"CAN'T REMEMBER\"    Penicillins      \"SORES IN MOUTH\"    Riluzole Difficulty breathing and Swelling    Sulfa Antibiotics      \"PT DOES NOT KNOW WHAT THE REACTION WAS\"    Topiramate Other (See Comments)     Frequent urination    Dextromethorphan Anxiety        Social History  Social History     Tobacco Use    Smoking status: Former     Current packs/day: 0.00     Average packs/day: 2.5 packs/day for 29.2 years (72.9 ttl pk-yrs)     Types: Cigarettes     Start date: 1971     Quit date: 2000     Years since quittin.6     Passive exposure: Current    Smokeless tobacco: Never   Vaping Use    Vaping status: Never Used   Substance Use Topics    Alcohol use: Not Currently     Comment: relapse 2021 sober     Drug use: Yes     Types: Marijuana     Comment: smoking and edibles daily     ;   Medications are too expensive -- can't afford trillogy  Has a bed that measures her sleep  Lots of clothes that don't fit     Physical Exam   GENERAL surgical  mask; talking. She is not grossly cushingoid; cane hanging nearby  /80   Pulse 88   Ht 1.651 m (5' 5\")   Wt 78 kg (172 lb)   LMP  (LMP Unknown)   SpO2 100%   BMI 28.62 kg/m    SKIN: normal color, temperature, texture ;  HEENT: prominent eyes; PER, no scleral icterus, eyelid retraction, stare,  or conjunctival injection.  .    NECK: supple.  No visible or palpable neck masses, cervical adenopathy,or goiter.  Thyroid is not palpable   LUNGS: clear to auscultation bilaterally.   CARDIAC: RRR, S1, S2 without murmurs, rubs or gallops.    BACK: normal spinal contour.    NEURO: Alert, responds appropriately to questions,  moves all extremities, DTRs 0/4, gait " normal, no tremor of the outstretched hand    DATA REVIEW    EXAM: DX HIP/PELVIS/SPINE, DX WRIST/HEEL/RADIUS  LOCATION: [facility]  DATE: 1/22/2024     INDICATION:  Postablative hypothyroidism, Post-menopausal, Hx of corticosteroid therapy.  DEMOGRAPHICS: Age- 68 years. Gender- Female. Menopausal status- Postmenopausal.  COMPARISON: 12/31/2008.  TECHNIQUE: Dual-energy x-ray absorptiometry (DXA) performed with routine technique. Trabecular bone score (TBS) analysis performed.     FINDINGS:     DXA RESULTS  -Lumbar Spine: L1-L2: BMD: 1.151 g/cm2. T-score: -0.1. Z-score: 0.5.   -RIGHT Hip Total: BMD: 0.746 g/cm2. T-score: -2.1. Z-score: -1.4.  -RIGHT Hip Femoral neck: BMD: 0.742 g/cm2. T-score: -2.1. Z-score: -1.1.  -LEFT Hip Total: BMD: 0.805 g/cm2. T-score: -1.6. Z-score: -0.9.  -LEFT Hip Femoral neck BMD: 0.820 g/cm2. T-score: -1.6. Z-score: -0.6.  -LEFT Radius 33%: BMD: 0.662 g/cm2. T-score: -0.6. Z-score: 1.2.     WHO T-SCORE CRITERIA  -Normal: T score at or above -1 SD  -Osteopenia: T score between -1 and -2.5 SD  -Osteoporosis: T score at or below -2.5 SD     The World Health Organization (WHO) criteria is applicable to perimenopausal females, postmenopausal females, and men aged 50 years or older.     TBS RESULTS  -Lumbar Spine L1-L2: TBS: 1.332. TBS T-score: -1.7. TBS Z-score: 0.8     The TBS is a DXA derived measurement for fracture risk assessment, and reflects the structural condition of the bone microarchitecture. It can be used to adjust WHO Fracture Risk Assessment Tool (FRAX) probability of fracture in postmenopausal women and   older men. The calculated probabilities of fracture have been shown to be more accurate when computed with the TBS.     INTERVAL CHANGE  -There has been a 12.1% increase in lumbar spine BMD.  -There has been a -7.7% decrease in right hip BMD.     FRACTURE RISK  -FRAX Results: The 10 year probability of major osteoporotic fracture is 18.4%, and of hip  fracture is 4.3%, based on the right femoral neck BMD.  -TBS-adjusted FRAX Results: The 10 year probability of major osteoporotic fracture is 16.9%, and of hip fracture is 4.0%.     RECOMMENDATIONS  Consider treatment if major osteoporotic fracture score is greater than or equal to 20%, and if the hip fracture score is greater than or equal to 3%.                                                                      IMPRESSION: Low bone density (OSTEOPENIA). T score meets the WHO criteria for low bone density (osteopenia) at one or more measured sites. The risk of osteoporotic fracture increases approximately two-fold for each standard deviation decrease in T-score.

## 2025-02-12 NOTE — LETTER
2/12/2025       RE: Randi Cleary  [address]  [facility] [address]     Dear Colleague,    Thank you for referring your patient, Randi Cleary, to the [facility] [place] at [facility]. Please see a copy of my visit note below.    01/17/25 4:03 PM  PATIENT LAB/IMAGING STATUS : No pending lab orders       Endocrine  note    Attending Assessment/Plan :     Low bone density-- start alendronate  again discussed  Rx alendronate 70 mg/week  Next DXA 1/2026 and see me after     History of left sided pheochromocytoma, 2.8 cm, removed 8/17.  This has been presumed sporadic but it might not be.    24 hour urine when not sick      History of unilateral adrenalectomy; history of corticosteroid injection     Sleep apnea , prescribed  CPAP, not using it.    This could give us false positive on the pheo tests.     Pre/diabetes by HgbA1c.   Recommend continued monitoring 1-2 times/year.  Lifestyle diet and exercise     Hypothyroidism following radioiodine treatment for  Graves' .  Treat to high normal target TSH  . She is on synthroid brand 150 x 6.5/week .   Most recent TSH with a fib was lower than our target of high normal TSH .  Repeat labs today again show low normal TSH.    Rx was refilled at the present dose at the appt.   We could tweak the dose later.  I will propose this via [employer] on follow up.     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  I am listing this so I don't forget about it.     63_ minutes spent on the date of the encounter doing chart review, history and exam, documentation and further activities as noted above.    Alee Coker MD    Chief complaint/ HISTORY OF PRESENT ILLNESS Winnie returns for follow up of hypothyroidism following 131I for Graves', history of left pheochromocytoma with unilateral adrenalectomy, prediabetes.  I last saw her 1/2024.   Since then, DXA showed low BMD but with FRAX  16.9/4%.  I recommended alendronate (which she is not on ).   At our last visit she was on Synthroid 150 mcg/day and she is now on 150 x 6.5/week. She already had labs in anticipation of this appt .     Other events in the last year  5/21-6/22/24 hospitalized [facility] psych with diagnosis major depressive disorder, recurrent, severe, with anxious distress  ECT therapy has been used , most recently 2/5/2025 11/22-11/23/24 hospitalized [facility] with A fib, likely paroxysmal, hypotension, pulmonary HTN  The a fib onset followed ECT    Relevant past endocrine history :   Graves' disease was treated with 131I x 2 many years ago.  She has been on LT4 since then.     Adrenal mass was lst noted in 2006.  At that time it was 1.5 cm.  On follow up imaging in 2017 it was 2.5 cm.  Laparoscopic left adrenalectomy  8/2/17 ([facility]) removed pheochromocytoma 2.8 cm.  .    Increased metanephrine noted 9/2020 labs was followed by normal  repeat plasma metanephrine 10/2020 and 24 hour urine metanephrine levels which were normal 11/2020.      Low bone density was noted on DXA      We have the following relevant labs  4/19/17 urine cortisol 17 mcg/24 hours (3.5-45); metanephrine 719 mcg/24 hours (normal < 400), normetanephrine 409 mcg/24 hours (< 900), total metanephrine 1128 mcg/24 hours (< 1300), NE 47 mcg/24 hours (15-80), EPI 18 mcg/24 hoiurs (< 21),  ( mcg/24 hours), 24 hour urine volume 2025 12/4/19 f normetanephrine 0.89, metanephrine 0.12  2/20/2020 metanephrine 0.12, normetanephrine 0.8  8/3/2020 Fe 51, transferrin saturation 16, 25OHD 68.2, B12 755  9/9/2020 metanephrine 0.11, normetaenprhine 0.9 ,  pg/ml (700-750 supine) , epi < 25, DA < 25  10/21/2020 labs aldosterone 16, metanephrine 0.11, normetanephrine 0.49, renin activity 1.8  10/30/2020 TSH 0.37, HbbA1c 6.1%, Ca 9.8, Na 140 K 4, ferritin 20  11/2/20202 24 hour urine metanephrine 46 mg/day (), normetanephrine  186 mcg/day (), volume 2700, urine  creatinine 837 mg/day  12/4/2020  [facility] TSH 0.06, free t4 1.2, free T3 2.8, reverse T3 29.6 on LT4 175 mcg/day  5/14/2021 TSH 0.87, ferritin 66, Ca 9.1, glucose 127, creatinine 0.69 Hgba1c 5.4.   5/18/2021 TSH 1.31, cholesterol 180, HDL 78, , LDl 83, NTproBNP 66 on LT4 150 mc/gday  8/31/22 TSH 1.76  1/18/2023 TSh 1.58, free T4 1.46, T3 114 on Synthroid 150 mcg/day  7/14/23 UAE < 12, HgbA1c 5.8, TSH 3.18  10/17/23 TSH 0.24, HgbA1c 5.7%, glucose 111, creatinine 0.67, Na 142, K 4.1, Ca 9.6,   12/29/23 weight 214 TSH 0.34, free T4 1.75   3/29/24 TSH 1.31, free T4 1.49  8/21/24 HgbA1c 5.7%  11/22/24 TSh 0.4 hospitalized for a fib  2/12/25 TSH 0.5, free T4 1.49  not discussed at appt     Imaging  5/15/1994 15 mCi 131I; prior 123I thyroid uptake scan: 69.1% 24 ilir ruprake; enlarged gland with prominent pyramidal lobe  12/7/1994: 123I thyroid uptake 52%  3/9/17 US thyroid ([facility])  6/16/17 MR abdomen ([facility]): 2.5 cm left adrenal nodule (was 1.5 11/9/06)  6/24/19 CTA chest: evidence for pulmonary artery HTN, hepatic steattosis;   1/22/24  use L1-L2  Lowest T-score -2.1 right femoral neck  L1-l2 bone gain 12.1%  Mean hip bone loss -7.7%   FRAX 16.9/4%      REVIEW OF SYSTEMS  Feeling really good now   Don't sleep and this leads to anxiety and this leads to depression   Tracks sleep   Using lavender oil for sleep - it works   Weight loss dramatic summer 2024 without effort - had no appetite then  ; weight got down to 161 and then gained 10#, want to lose it  Seeing dietician and [place] - wants to get on mediterranean diet -   Reading up on aging   I think I have sarcopenia -  no muscle; weak  No balance   Appetite is currently better   Cardiac: Dehydration brought on the a fib; haven't had it since ;' no palpitations  Respiratory: + mucous still ; not using CPAP   GI: negative   Not on alendronate  Getting ECT   [place] and hit head in ? January   Gout in hands  is painful   Cancelled shoulder surgery for  torn rotator cuff   Hasn't had steroid injection within the last year   Using vibrating device on shoulder - it does help   No headaches  Anger and rage not as bad as when I had the pheo  Cannabis makes me feel better - reduces anxiety   Nocturia 2-6 times/night  Incontinence   Can't afford alendronate  Are there any supplemetns I can take?    Eye problems - upcoming eye appt    Past Medical History  Past Medical History:   Diagnosis Date     Bipolar 2 disorder (H)      Breast cancer (H) 1986    lumpectomy, radiation, chemo     Chronic pain syndrome      COPD (chronic obstructive pulmonary disease) (H)     asthma     Cord compression (H) 12/21/2021     Dizzy      Drug tolerance     opioid     Esophageal reflux      Fatigue      Generalized anxiety disorder      Graves disease 1994     Hemochromatosis 02/14/2018    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; H63D not detected     History of corticosteroid therapy 11/19/2019     History of partial adrenalectomy 11/19/2019     Hx antineoplastic chemotherapy      Hx of radiation therapy      Hyperlipidaemia      Hypertension      Impaired fasting glucose 2017     Infection due to 2019 novel coronavirus 06/04/2021     Injury of neck, whiplash 07/15/2021     Joint pain      KYAW (obstructive sleep apnea) 2016     Osteopenia      Paroxysmal atrial fibrillation (H) 11/2024     Pheochromocytoma, left 08/02/2017    laparoscopically removed     Postablative hypothyroidism 1995     Prediabetes 10/03/2019    by A1c     Psoriasis      Psoriatic arthropathy (H)      Pulmonary hypertension (H)      Right rotator cuff tear      RLS (restless legs syndrome)     on ropinorole     Sacroiliitis      Serotonin syndrome 08/28/2020    [facility] - While on desvenlafaxine 100mg     Snoring      Spinal stenosis      Status post coronary angiogram 10/03/2019     Urinary incontinence      Vitamin B 12 deficiency 2009     Vitamin D deficiency 2010     Past Surgical History:   Procedure Laterality Date      ARTHRODESIS ANKLE       ARTHROPLASTY ANKLE Right 6/29/2015    Procedure: ARTHROPLASTY ANKLE;  Surgeon: Jason Coughlin MD;  Location: [facility]     ARTHROPLASTY REVISION ANKLE Right 6/29/2015    Procedure: ARTHROPLASTY REVISION ANKLE;  Surgeon: Jason Coughlin MD;  Location:  [facility]     BIOPSY BREAST       BREAST BIOPSY, CORE RT/LT       COLONOSCOPY       COLONOSCOPY N/A 2/25/2021    Procedure: COLONOSCOPY;  Surgeon: Guru Elke Tolbert MD;  Location:  GI     CV CORONARY ANGIOGRAM N/A 10/3/2019    Procedure: CV CORONARY ANGIOGRAM;  Surgeon: Bryce Pierre MD;  Location:  HEART CARDIAC CATH LAB     CV RIGHT HEART CATH MEASUREMENTS RECORDED N/A 10/3/2019    Procedure: CV RIGHT HEART CATH;  Surgeon: Bryce Pierre MD;  Location:  HEART CARDIAC CATH LAB     CV RIGHT HEART CATH MEASUREMENTS RECORDED N/A 9/25/2024    Procedure: Heart Cath Right Heart Cath;  Surgeon: George Becker MD;  Location:  HEART CARDIAC CATH LAB     ESOPHAGOSCOPY, GASTROSCOPY, DUODENOSCOPY (EGD), COMBINED N/A 2/25/2021    Procedure: ESOPHAGOGASTRODUODENOSCOPY, WITH BIOPSY;  Surgeon: Guru Elke Tolbert MD;  Location:  GI     EYE SURGERY  2021     HC REMOVE TONSILS/ADENOIDS,<13 Y/O      Description: Tonsillectomy With Adenoidectomy;  Recorded: 04/07/2010;     IR LUMBAR EPIDURAL STEROID INJECTION  10/26/2004     IR LUMBAR EPIDURAL STEROID INJECTION  11/16/2004     IR LUMBAR EPIDURAL STEROID INJECTION  12/21/2004     IR LUMBAR EPIDURAL STEROID INJECTION  6/8/2006     JOINT REPLACEMENT       LAMINOPLASTY CERVICAL POSTERIOR THREE+ LEVELS Left 12/21/2021    Procedure: CERVICAL 3-CERVICAL 6 LEFT OPEN DOOR LAMINOPLASTY AND LEFT CERVICAL 4-5 AND CERVICAL 6-7 POSTERIOR FORAMINOTOMY;  Surgeon: Angela Gregory MD;  Location: [facility] Main OR     LAPAROSCOPIC ADRENALECTOMY Left 08/02/2017    pheochromocytoma     LAPAROSCOPIC ADRENALECTOMY Left 8/2/2017    Procedure: LAPAROSCOPIC LEFT  ADRENALECTOMY, ;  Surgeon: Gab Linares MD;  Location: [facility] OR;  Service:      LENGTHEN TENDON ACHILLES Right 6/29/2015    Procedure: LENGTHEN TENDON ACHILLES;  Surgeon: Jason Coughlin MD;  Location: [facility]     LUMPECTOMY BREAST       LUMPECTOMY BREAST Left 1994     MAMMOPLASTY REDUCTION Right 01/13/2015    [place]     MAMMOPLASTY REDUCTION Right     approx late [id number]     MASTECTOMY      left lumpectomy with axillary node dissection     MASTECTOMY MODIFIED RADICAL       OTHER SURGICAL HISTORY Right     reconstructive breast surgery     OTHER SURGICAL HISTORY      Adrenalectomy for pheochromocytoma     NC MASTECTOMY, MODIFIED RADICAL      Description: Modified Radical Mastectomy Left Breast;  Recorded: 04/07/2010;     REPAIR HAMMER TOE Right 6/29/2015    Procedure: REPAIR HAMMER TOE;  Surgeon: Jason Coughlin MD;  Location: [facility]     TONSILLECTOMY       TONSILLECTOMY & ADENOIDECTOMY       C ARTHRODESIS,ANKLE,OPEN Right     Description: Ankle Arthrodesis;  Recorded: 04/07/2010;     Breast lumpectomy 1986  Breast lumpectomy 1994  Reconstruction on right breast to make more symmmetrical to left     Medications    Current Outpatient Medications   Medication Sig Dispense Refill     acetaminophen (TYLENOL) 325 MG tablet Take 325-650 mg by mouth every 6 hours as needed for mild pain.       albuterol (VENTOLIN HFA) 108 (90 Base) MCG/ACT inhaler Inhale 2 puffs into the lungs every 6 hours as needed for shortness of breath, wheezing or cough 18 g 11     alendronate (FOSAMAX) 70 MG tablet Take 1 tablet (70 mg) by mouth every 7 days. Take 60 minutes before am meal with 8 oz. water. Remain upright for 30 minutes. 12 tablet 3     benzonatate (TESSALON) 100 MG capsule Take 1 capsule (100 mg) by mouth 3 times daily as needed for cough. 90 capsule 1     Cyanocobalamin (VITAMIN B-12) 5000 MCG SUBL Place 2-3 sprays under the tongue every morning. Unknown dose. 2 or 3 sprays/day       ethacrynic acid  (EDECRIN) 25 MG tablet Take by mouth daily.       Fluticasone-Umeclidin-Vilant (TRELEGY ELLIPTA) 100-62.5-25 MCG/ACT oral inhaler Inhale 1 puff into the lungs daily. 60 each 3     gabapentin (NEURONTIN) 100 MG capsule Take 2 capsules (200 mg) by mouth at bedtime. May also take 1 capsule (100 mg) 2 times daily as needed for other (anxiety). Do not use the night before ECT..       ketoconazole (NIZORAL) 2 % external shampoo Apply topically daily as needed.       KLOR-CON M20 20 MEQ CR tablet Take 1 tablet (20 mEq) by mouth every morning. 90 tablet 3     Lavender Oil 80 MG CAPS Take 160 mg by mouth at bedtime.       MAGNESIUM PO Take 2 capsules by mouth 2 times daily. Strength unknown       medical cannabis (Patient's own supply) See Admin Instructions (The purpose of this order is to document that the patient reports taking medical cannabis.  This is not a prescription, and is not used to certify that the patient has a qualifying medical condition.)  Flower       melatonin 1 MG CAPS Take 1 mg by mouth at bedtime.       naloxone (NARCAN) 4 MG/0.1ML nasal spray Spray 1 spray (4 mg) into one nostril alternating nostrils as needed for opioid reversal every 2-3 minutes until assistance arrives 0.2 mL 0     omeprazole (PRILOSEC) 20 MG DR capsule Take 1 capsule (20 mg) by mouth as needed. 90 capsule 3     oxyCODONE (ROXICODONE) 5 MG tablet Take 1 tablet (5 mg) by mouth 5 times daily. May dispense 02/04/25 for start 02/06/2025 70 tablet 0     rOPINIRole (REQUIP) 2 MG tablet Take 1 tablet (2 mg) by mouth 3 times daily. 270 tablet 3     STATIN NOT PRESCRIBED (INTENTIONAL) Please choose reason not prescribed from choices below.       SYNTHROID 150 MCG tablet Take 1 tablet (150 mcg) by mouth every morning. MON to SAT 1 tablet/day; SUN 0.5 tablet - confirmed dosing with patient 11/22       UNABLE TO FIND Take 1 tablet by mouth every morning. MEDICATION NAME: CETYL-MYRISFOLEATE       vitamin C (ASCORBIC ACID) 1000 MG TABS Take  "1,000 mg by mouth every morning.       vitamin D3 (CHOLECALCIFEROL) 250 mcg (02176 units) capsule Take 1 capsule by mouth once a week.        guaiFENesin (MUCINEX) 600 MG 12 hr tablet Take 600 mg by mouth every morning. (Patient not taking: Reported on 2025)       Oxycodone is 1 tablet 4 times/day  , some days 5     Allergies  Allergies   Allergen Reactions     Serotonin Reuptake Inhibitors (Ssris) Anxiety, Difficulty breathing, Headache, Palpitations and Shortness Of Breath     Buspirone      The patient states she had serotonin syndrome     Cephalexin      Other reaction(s): unknown rxn.     Desvenlafaxine      Serotonin syndrome     Diclofenac Sodium [Diclofenac]      Serotonin syndrome and restless legs syndrome     Gabapentin      Drove on the wrong side of the highway     Levofloxacin      \"CAN'T REMEMBER\"     Penicillins      \"SORES IN MOUTH\"     Riluzole Difficulty breathing and Swelling     Sulfa Antibiotics      \"PT DOES NOT KNOW WHAT THE REACTION WAS\"     Topiramate Other (See Comments)     Frequent urination     Dextromethorphan Anxiety        Social History  Social History     Tobacco Use     Smoking status: Former     Current packs/day: 0.00     Average packs/day: 2.5 packs/day for 29.2 years (72.9 ttl pk-yrs)     Types: Cigarettes     Start date: 1971     Quit date: 2000     Years since quittin.6     Passive exposure: Current     Smokeless tobacco: Never   Vaping Use     Vaping status: Never Used   Substance Use Topics     Alcohol use: Not Currently     Comment: relapse 2021 sober      Drug use: Yes     Types: Marijuana     Comment: smoking and edibles daily     ;   Medications are too expensive -- can't afford trillogy  Has a bed that measures her sleep  Lots of clothes that don't fit     Physical Exam   GENERAL surgical  mask; talking. She is not grossly cushingoid; cane hanging nearby  /80   Pulse 88   Ht 1.651 m (5' 5\")   Wt 78 kg (172 lb)   LMP  (LMP " Unknown)   SpO2 100%   BMI 28.62 kg/m    SKIN: normal color, temperature, texture ;  HEENT: prominent eyes; PER, no scleral icterus, eyelid retraction, stare,  or conjunctival injection.  .    NECK: supple.  No visible or palpable neck masses, cervical adenopathy,or goiter.  Thyroid is not palpable   LUNGS: clear to auscultation bilaterally.   CARDIAC: RRR, S1, S2 without murmurs, rubs or gallops.    BACK: normal spinal contour.    NEURO: Alert, responds appropriately to questions,  moves all extremities, DTRs 0/4, gait normal, no tremor of the outstretched hand    DATA REVIEW    EXAM: DX HIP/PELVIS/SPINE, DX WRIST/HEEL/RADIUS  LOCATION: [facility]  DATE: 1/22/2024     INDICATION:  Postablative hypothyroidism, Post-menopausal, Hx of corticosteroid therapy.  DEMOGRAPHICS: Age- 68 years. Gender- Female. Menopausal status- Postmenopausal.  COMPARISON: 12/31/2008.  TECHNIQUE: Dual-energy x-ray absorptiometry (DXA) performed with routine technique. Trabecular bone score (TBS) analysis performed.     FINDINGS:     DXA RESULTS  -Lumbar Spine: L1-L2: BMD: 1.151 g/cm2. T-score: -0.1. Z-score: 0.5.   -RIGHT Hip Total: BMD: 0.746 g/cm2. T-score: -2.1. Z-score: -1.4.  -RIGHT Hip Femoral neck: BMD: 0.742 g/cm2. T-score: -2.1. Z-score: -1.1.  -LEFT Hip Total: BMD: 0.805 g/cm2. T-score: -1.6. Z-score: -0.9.  -LEFT Hip Femoral neck BMD: 0.820 g/cm2. T-score: -1.6. Z-score: -0.6.  -LEFT Radius 33%: BMD: 0.662 g/cm2. T-score: -0.6. Z-score: 1.2.     WHO T-SCORE CRITERIA  -Normal: T score at or above -1 SD  -Osteopenia: T score between -1 and -2.5 SD  -Osteoporosis: T score at or below -2.5 SD     The World Health Organization (WHO) criteria is applicable to perimenopausal females, postmenopausal females, and men aged 50 years or older.     TBS RESULTS  -Lumbar Spine L1-L2: TBS: 1.332. TBS T-score: -1.7. TBS Z-score: 0.8     The TBS is a DXA derived measurement for fracture risk assessment, and reflects the  structural condition of the bone microarchitecture. It can be used to adjust WHO Fracture Risk Assessment Tool (FRAX) probability of fracture in postmenopausal women and   older men. The calculated probabilities of fracture have been shown to be more accurate when computed with the TBS.     INTERVAL CHANGE  -There has been a 12.1% increase in lumbar spine BMD.  -There has been a -7.7% decrease in right hip BMD.     FRACTURE RISK  -FRAX Results: The 10 year probability of major osteoporotic fracture is 18.4%, and of hip fracture is 4.3%, based on the right femoral neck BMD.  -TBS-adjusted FRAX Results: The 10 year probability of major osteoporotic fracture is 16.9%, and of hip fracture is 4.0%.     RECOMMENDATIONS  Consider treatment if major osteoporotic fracture score is greater than or equal to 20%, and if the hip fracture score is greater than or equal to 3%.                                                                      IMPRESSION: Low bone density (OSTEOPENIA). T score meets the WHO criteria for low bone density (osteopenia) at one or more measured sites. The risk of osteoporotic fracture increases approximately two-fold for each standard deviation decrease in T-score.         Again, thank you for allowing me to participate in the care of your patient.      Sincerely,    Alee Coker MD

## 2025-02-13 ENCOUNTER — OFFICE VISIT (OUTPATIENT)
Dept: FAMILY MEDICINE | Facility: CLINIC | Age: 70
End: 2025-02-13
Payer: MEDICARE

## 2025-02-13 DIAGNOSIS — E89.0 POSTABLATIVE HYPOTHYROIDISM: Primary | ICD-10-CM

## 2025-02-13 DIAGNOSIS — R63.4 WEIGHT LOSS: ICD-10-CM

## 2025-02-13 DIAGNOSIS — Z71.3 DIETARY COUNSELING AND SURVEILLANCE: Primary | ICD-10-CM

## 2025-02-13 NOTE — PROGRESS NOTES
"[facility]    Winnie is a 69 year old female who presents for nutrition counseling related to concern for sarcopenia.  Just after our last visit in Feb 2024, Winnie started treatment resistant depression resulting in hospitalization for one month. Now receiving ECT regularly which has been very helpful. Over the past year she has lost 40 lbs unintentionally (a 19% weight loss), also leading to loss of muscle mass. She has had 2 falls and notes general decreased endurance and balance.       Recent diet recall:  Breakfast: cottage cheese with 19 gm protein, big rolls from [employer] with butter, cheese, glass of milk (once per day)  Might snack on cheese and crackers  Dinner: not cooking any longer; progresso soup with extra vegetables, chicken noodle soup; bagged salad with grape tomatoes  Just purchased some tuna, likes sardines but avoiding due to gout  Will use rotisserie chicken, or will cook chicken and put on salad  Avoiding chocolate except for no-sugar dark chocolate from [employer]  Scrambled eggs regularly  Fruit - 1 pc per day; thinking about making applesauce    Poor sleep leading to anxiety and panic attacks    Social: .  is a .   Physical activity: Cleaning the house regularly. Trying to get to the pool, some anxiety about this though. Walking. Has an exercise mat at home.   Medications: synthroid, omeprazole, gabapentin  Vitamins/Supplements: includes MVI, vitamin D, magnesium     Recent weights:   Wt Readings from Last 6 Encounters:   02/12/25 78 kg (172 lb)   01/15/25 76.2 kg (168 lb)   01/08/25 76.5 kg (168 lb 9.6 oz)   12/31/24 77.6 kg (171 lb)   12/19/24 73.9 kg (163 lb)   12/05/24 75.4 kg (166 lb 3.2 oz)   Wt at our last video visit was 2/21/24 - 210 lb      Estimated body mass index is 28.62 kg/m  as calculated from the following:    Height as of 2/12/25: 1.651 m (5' 5\").    Weight as of 2/12/25: 78 kg (172 lb).    Recent A1C values:   Hemoglobin A1C   Date " Value Ref Range Status   02/12/2025 5.4 <5.7 % Final     Comment:     Normal <5.7%   Prediabetes 5.7-6.4%    Diabetes 6.5% or higher     Note: Adopted from ADA consensus guidelines.   08/21/2024 5.7 (H) 0.0 - 5.6 % Final     Comment:     Normal <5.7%   Prediabetes 5.7-6.4%    Diabetes 6.5% or higher     Note: Adopted from ADA consensus guidelines.   05/22/2024 5.6 <5.7 % Final     Comment:     Normal <5.7%   Prediabetes 5.7-6.4%    Diabetes 6.5% or higher     Note: Adopted from ADA consensus guidelines.   10/30/2020 6.1 (H) 0 - 5.6 % Final     Comment:     Normal <5.7% Prediabetes 5.7-6.4%  Diabetes 6.5% or higher - adopted from ADA   consensus guidelines.     02/12/2020 7.2 (H) 0 - 5.6 % Final     Comment:     Normal <5.7% Prediabetes 5.7-6.4%  Diabetes 6.5% or higher - adopted from ADA   consensus guidelines.     10/03/2019 6.0 (H) 0 - 5.6 % Final     Comment:     Normal <5.7% Prediabetes 5.7-6.4%  Diabetes 6.5% or higher - adopted from ADA   consensus guidelines.       Recent lipid values:   Cholesterol   Date Value Ref Range Status   08/06/2024 205 (H) <200 mg/dL Final   05/22/2024 183 <200 mg/dL Final   05/18/2021 180 <200 mg/dL Final   08/13/2019 221 (H) <200 mg/dL Final     Comment:     Desirable:       <200 mg/dl     HDL Cholesterol   Date Value Ref Range Status   05/18/2021 78 >49 mg/dL Final   08/13/2019 78 >49 mg/dL Final     Direct Measure HDL   Date Value Ref Range Status   08/06/2024 63 >=50 mg/dL Final   05/22/2024 61 >=50 mg/dL Final     LDL Cholesterol Calculated   Date Value Ref Range Status   08/06/2024 122 (H) <=100 mg/dL Final   05/22/2024 103 (H) <=100 mg/dL Final   05/18/2021 83 <100 mg/dL Final     Comment:     Desirable:       <100 mg/dl   08/13/2019 99 <100 mg/dL Final     Comment:     Desirable:       <100 mg/dl     Triglycerides   Date Value Ref Range Status   08/06/2024 98 <150 mg/dL Final   05/22/2024 93 <150 mg/dL Final   05/18/2021 101 <150 mg/dL Final   08/13/2019 220 (H) <150 mg/dL  Final     Comment:     Borderline high:  150-199 mg/dl  High:             200-499 mg/dl  Very high:       >499 mg/dl         Intervention(s):  Winnie is a 69 year old female who presents for nutrition counseling related to concern for sarcopenia. She has experienced a nearly 19% unintentional weight loss in the past year causing severe muscle loss.   To target sarcopenia, the focus is on consuming enough protein and building muscle. Aim to include a source of protein in each meal and snack. Chicken, fish, eggs, cottage cheese, greek yogurt, nuts/seeds (almonds, walnuts, jade seeds, pumpkin seeds, flax seeds), beans/lentils.   As a long term goal, aim for 80-90 grams protein/day (1-1.2 g per kg of body weight)  Split your calcium supplement into two doses per day. The goal is 1500 mg/day total between supplements and food. Because you are taking a multivitamin and eating at least one serving of dairy per day, suggest taking around 500 mg calcium from a supplement.   Search You Tube for standing or chair exercises to build strength. Kassandra Llamas is a PT with some nice videos to start. Be careful with any movements requiring balance.     Follow up:   Thursday March 27 at 2:00 pm in clinic    Patient referred by Kaushik oHbbs CNP   Total time spent with patient 45 minutes    Ekaterina Horton RD

## 2025-02-13 NOTE — PATIENT INSTRUCTIONS
To target sarcopenia, the focus is on consuming enough protein and building muscle. Aim to include a source of protein in each meal and snack. Chicken, fish, eggs, cottage cheese, greek yogurt, nuts/seeds (almonds, walnuts, jade seeds, pumpkin seeds, flax seeds), beans/lentils.   As a long term goal, aim for 80-90 grams protein/day (1-1.2 g per kg of body weight)  Split your calcium supplement into two doses per day. The goal is 1500 mg/day total between supplements and food. Because you are taking a multivitamin and eating at least one serving of dairy per day, suggest taking around 500 mg calcium from a supplement.   Search You Tube for standing or chair exercises to build strength. Kassandra Llamas is a PT with some nice videos to start. Be careful with any movements requiring balance.     Follow up:   Thursday March 27 at 2:00 pm     Ekaterina Horton RD

## 2025-02-16 ENCOUNTER — MYC REFILL (OUTPATIENT)
Dept: PALLIATIVE MEDICINE | Facility: OTHER | Age: 70
End: 2025-02-16
Payer: MEDICARE

## 2025-02-16 DIAGNOSIS — M19.071 OSTEOARTHRITIS OF RIGHT ANKLE AND FOOT: ICD-10-CM

## 2025-02-17 ENCOUNTER — VIRTUAL VISIT (OUTPATIENT)
Dept: PSYCHOLOGY | Facility: CLINIC | Age: 70
End: 2025-02-17
Payer: MEDICARE

## 2025-02-17 DIAGNOSIS — F33.2 MAJOR DEPRESSIVE DISORDER, RECURRENT SEVERE WITHOUT PSYCHOTIC FEATURES (H): Primary | ICD-10-CM

## 2025-02-17 DIAGNOSIS — F41.1 GENERALIZED ANXIETY DISORDER: ICD-10-CM

## 2025-02-17 PROCEDURE — 90837 PSYTX W PT 60 MINUTES: CPT | Mod: 95 | Performed by: MARRIAGE & FAMILY THERAPIST

## 2025-02-17 RX ORDER — OXYCODONE HYDROCHLORIDE 5 MG/1
5 TABLET ORAL
Qty: 70 TABLET | Refills: 0 | Status: SHIPPED | OUTPATIENT
Start: 2025-02-17

## 2025-02-17 NOTE — PROGRESS NOTES
[facility]                                     Progress Note    Patient Name: Randi Cleary  Date: 2025       Service Type: Individual      Session Start Time: 10:31  Session End Time: 11:25     Session Length: 53+    Session #: 18    Attendees: Client    Service Modality:  Video Visit:      Provider verified identity through the following two step process.  Patient provided:  Patient  and Patient address    Telemedicine Visit: The patient's condition can be safely assessed and treated via synchronous audio and visual telemedicine encounter.      Reason for Telemedicine Visit: Patient has requested telehealth visit    Originating Site (Patient Location): Patient's home    Distant Site (Provider Location): Provider Remote Setting- Home Office    Consent:  The patient/guardian has verbally consented to: the potential risks and benefits of telemedicine (video visit) versus in person care; bill my insurance or make self-payment for services provided; and responsibility for payment of non-covered services.     Patient would like the video invitation sent by:  My Chart    Mode of Communication:  Video Conference via [employer]well    Distant Location (Provider):  Off-site    As the provider I attest to compliance with applicable laws and regulations related to telemedicine.    DATA  Interactive Complexity: No  Crisis: No        Progress Since Last Session (Related to Symptoms / Goals / Homework):   Symptoms: No change      Homework:  n/a      Episode of Care Goals: Satisfactory progress - ACTION (Actively working towards change); Intervened by reinforcing change plan / affirming steps taken     Current / Ongoing Stressors and Concerns:   Sleep disturbance recently that coincides with ruminating about family of origin stressors, both past and present.  Reported feeling a sense of calm and release when holding pillow to her chest and focusing on the body.     Treatment Objective(s) Addressed in  This Session:   Distress tolerance     Intervention:   Validation, safety assessment.  Encouragement.  Small steps toward larger goals      Assessments completed prior to visit:  The following assessments were completed by patient for this visit:  PHQ9:       12/23/2024     7:30 AM 1/2/2025     2:05 PM 1/23/2025     8:51 AM 1/28/2025     9:34 AM 2/3/2025    11:13 AM 2/10/2025    11:46 AM 2/17/2025     9:49 AM   PHQ-9 SCORE   PHQ-9 Total Score MyChart 0 24 (Severe depression) 15 (Moderately severe depression) 15 (Moderately severe depression) 11 (Moderate depression) 15 (Moderately severe depression) 16 (Moderately severe depression)   PHQ-9 Total Score 15 24  15  15  11  15  16        Patient-reported     GAD7:       10/8/2024     6:29 AM 10/22/2024     8:59 AM 11/4/2024     2:38 PM 12/3/2024     9:10 AM 12/17/2024     9:13 AM 1/23/2025     8:47 AM 2/10/2025    10:22 PM   CHAVO-7 SCORE   Total Score 9 (mild anxiety) 11 (moderate anxiety) 10 (moderate anxiety) 17 (severe anxiety) 13 (moderate anxiety) 11 (moderate anxiety) 14 (moderate anxiety)   Total Score 9    9 11    11 10  17  13  11  14        Patient-reported     CAGE-AID:       6/22/2020     7:12 PM 1/18/2022    11:15 PM 6/6/2024     8:00 AM   CAGE-AID Total Score   Total Score 4 4 4   Total Score MyChart 4 (A total score of 2 or greater is considered clinically significant) 4 (A total score of 2 or greater is considered clinically significant)      PROMIS 10-Global Health (all questions and answers displayed):       10/30/2024     8:48 AM 11/6/2024    12:54 PM 11/14/2024    11:35 AM 11/26/2024     5:00 PM 12/4/2024     9:28 AM 12/18/2024     9:10 AM 12/30/2024     5:44 PM   PROMIS 10   In general, would you say your health is: Fair Fair Fair  Good Fair Fair   In general, would you say your quality of life is: Poor Poor Poor  Poor Fair Poor   In general, how would you rate your physical health? Poor Good Fair  Good Fair Fair   In general, how would you rate  your mental health, including your mood and your ability to think? Fair Fair Fair  Fair Fair Poor   In general, how would you rate your satisfaction with your social activities and relationships? Fair Poor Poor  Poor Poor Poor   In general, please rate how well you carry out your usual social activities and roles Poor Poor Fair  Poor Poor Poor   To what extent are you able to carry out your everyday physical activities such as walking, climbing stairs, carrying groceries, or moving a chair? Moderately Moderately Moderately  Moderately Moderately Moderately   In the past 7 days, how often have you been bothered by emotional problems such as feeling anxious, depressed, or irritable? Often Often Often  Often Often Often   In the past 7 days, how would you rate your fatigue on average? Moderate Moderate Moderate  Moderate Moderate Moderate   In the past 7 days, how would you rate your pain on average, where 0 means no pain, and 10 means worst imaginable pain? 7 7 7  8 7 8   In general, would you say your health is: 2 2 2  3 2 2   In general, would you say your quality of life is: 1 1 1  1 2 1   In general, how would you rate your physical health? 1 3 2  3 2 2   In general, how would you rate your mental health, including your mood and your ability to think? 2 2 2  2 2 1   In general, how would you rate your satisfaction with your social activities and relationships? 2 1 1  1 1 1   In general, please rate how well you carry out your usual social activities and roles. (This includes activities at home, at work and in your community, and responsibilities as a parent, child, spouse, employee, friend, etc.) 1 1 2  1 1 1   To what extent are you able to carry out your everyday physical activities such as walking, climbing stairs, carrying groceries, or moving a chair? 3 3 3  3 3 3   In the past 7 days, how often have you been bothered by emotional problems such as feeling anxious, depressed, or irritable? 4 4 4  4 4 4   In  the past 7 days, how would you rate your fatigue on average? 3 3 3  3 3 3   In the past 7 days, how would you rate your pain on average, where 0 means no pain, and 10 means worst imaginable pain? 7 7 7  8 7 8   Global Mental Health Score 7  6  6   6  7  5    Global Physical Health Score 9  11  10   11  10  10    PROMIS TOTAL - SUBSCORES 16  17  16   17  17  15        Information is confidential and restricted. Go to Review Flowsheets to unlock data.    Patient-reported     [place] Suicide Severity Rating Scale (Lifetime/Recent)      6/11/2020    10:00 AM 1/9/2023     1:00 PM 5/21/2024     4:49 PM 5/21/2024     9:00 PM 6/6/2024     8:00 AM 7/10/2024    12:00 PM 11/22/2024    11:05 AM   [place] Suicide Severity Rating (Lifetime/Recent)   Q1 Wish to be Dead (Lifetime) Yes   Yes      Comments when patient was in treatment and there were family concerns   OD on medications      Q2 Non-Specific Active Suicidal Thoughts (Lifetime) Yes   No      Most Severe Ideation Rating (Lifetime) 5   5      Most Severe Ideation Description (Lifetime) when family problems were happening   OD on medication      Frequency (Lifetime) --   3      Duration (Lifetime) --   3      Controllability (Lifetime) 0   2      Protective Factors  (Lifetime) 5   4      Reasons for Ideation (Lifetime) --   5      Q1 Wished to be Dead (Past Month)  yes 1-->yes 1-->yes 1-->yes  0-->no   Q2 Suicidal Thoughts (Past Month)  no 1-->yes 1-->yes 1-->yes  0-->no   Q3 Suicidal Thought Method  no 0-->no 0-->no 1-->yes     Q4 Suicidal Intent without Specific Plan  no 1-->yes 0-->no 0-->no     Q5 Suicide Intent with Specific Plan  no 0-->no 0-->no 1-->yes     Q6 Suicide Behavior (Lifetime)  yes 1-->yes 0-->no 1-->yes  0-->no   If yes to Q6, within past 3 months?  no 1-->yes 0-->no 0-->no     Level of Risk per Screen  moderate risk high risk moderate risk high risk  no risks indicated   RETIRED: 1. Wish to be Dead (Recent) No         RETIRED: 2. Non-Specific Active  Suicidal Thoughts (Recent) No         3. Active Suicidal Ideation with any Methods (Not Plan) Without Intent to Act (Lifetime) Yes         RETIRE: Active Suicidal Ideation with any Methods (Not Plan) Description (Lifetime) 30 years prior         RETIRE: 4. Active Suicidal Ideation with Some Intent to Act, Without Specific Plan (Lifetime) Yes         RETIRE: 5. Active Suicidal Ideation with Specific Plan and Intent (Lifetime) Yes         Actual Attempt (Lifetime) Yes         Actual Attempt Description (Lifetime) patient reported overdosing on Prozac about thirty years ago         Total Number of Actual Attempts (Lifetime) 1         Actual Attempt (Past 3 Months) No         Has subject engaged in non-suicidal self-injurious behavior? (Lifetime) Yes         Has subject engaged in non-suicidal self-injurious behavior? (Past 3 Months) No         Interrupted Attempts (Lifetime) No         Interrupted Attempts (Past 3 Months) No         Aborted or Self-Interrupted Attempt (Lifetime) No         Aborted or Self-Interrupted Attempt (Past 3 Months) No         Preparatory Acts or Behavior (Lifetime) No         Preparatory Acts or Behavior (Past 3 Months) No         Most Recent Attempt Date --         Comments 30 years prior         Most Recent Attempt Actual Lethality Code 0         1. Wish to be Dead (Lifetime)      N    2. Non-Specific Active Suicidal Thoughts (Lifetime)      N          ASSESSMENT: Current Emotional / Mental Status (status of significant symptoms):   Risk status (Self / Other harm or suicidal ideation)   Patient denies current fears or concerns for personal safety.   Patient denies current or recent suicidal ideation or behaviors.   Patient denies current or recent homicidal ideation or behaviors.   Patient denies current or recent self injurious behavior or ideation.   Patient denies other safety concerns.   Patient reports there has been no change in risk factors since their last session.     Patient  reports there has been no change in protective factors since their last session.     A safety and risk management plan has been developed including: Patient consented to co-developed safety plan on 6/4/24.  Safety and risk management plan was reviewed.   Patient agreed to use safety plan should any safety concerns arise.  A copy was made available to the patient.     Appearance:   Appropriate    Eye Contact:   Good    Psychomotor Behavior: Normal    Attitude:   Friendly Pleasant   Orientation:   All   Speech    Rate / Production: Slow     Volume:  Normal    Mood:    Anxious  Sad    Affect:    Subdued    Thought Content:  Clear    Thought Form:  Goal Directed    Insight:    Good      Medication Review:   No changes to current psychiatric medication(s)     Medication Compliance:   Yes     Changes in Health Issues:   None reported     Chemical Use Review:   Substance Use: Chemical use reviewed, no active concerns identified      Tobacco Use: No current tobacco use.      Diagnosis:  1. 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 Generalized anxiety disorder        Collateral Reports Completed:   Not Applicable    PLAN: (Patient Tasks / Therapist Tasks / Other)  Continue following safety plan.  Focus on small tasks.  Self-care.  Use exercise of holding pillow to chest and noticing the body response.      Erika Colorado, [facility]                                                         ______________________________________________________________________    Individual Treatment Plan    Patient's Name: Randi Cleary  YOB: 1955    Date of Creation: 7/17/2024  Date Treatment Plan Last Reviewed/Revised: 7/17/2024, 10/30/24, 2/3/2025    DSM5 Diagnoses:   296.33 (F33.2) Major Depressive Disorder, Recurrent Episode, Severe   300.02 (F41.1) Generalized Anxiety Disorder  Psychosocial / Contextual Factors: history of trauma  PROMIS (reviewed every 90 days):     Referral /  Collaboration:  Discussed and patient will pursue a therapy group for depressive symptoms.    Anticipated number of session for this episode of care: 9-12 sessions  Anticipation frequency of session: Weekly  Anticipated Duration of each session: 38-52 minutes  Treatment plan will be reviewed in 90 days or when goals have been changed.       MeasurableTreatment Goal(s) related to diagnosis / functional impairment(s)  Goal 1: Client will keep self safe and eliminate suicidal ideation and self-harm behaviors.    Objective #A (Client Action)    Client will make a list of at least 10 skills or activities that you will to use to distract from urges to harm self.  Status: Completed - Date: 10/30/24       Intervention(s)  Therapist will provide ideas and strategies for generating coping skills list.    Objective #B  Client will make a list of pros and cons for tolerating and not tolerating an urge to harm self.  Status: Continued - Date(s): 2/3/2025    Intervention(s)  Therapist will guide client through DBT-style Pros/Cons list for behavior change.    Objective #C  Client will identify and practice the new strategies for dealing with strong emotions, learn and practice relaxation breathing.  Status: Continued - Date(s): 2/17/25      Intervention(s)  Therapist will teach distraction skills. Practice them within session and outside of session.    Goal 2: Client will report reduction in anxiety also as evidenced by reduction of CHAVO-7 score below 6 points within the next 12 weeks.    Objective #A (Client Action)    Client will identify at least three triggers for anxiety.  Status: Completed - Date: 10/30/24       Intervention(s)  Therapist will provide educational materials on common triggers and signs of anxiety.    Objective #B  Client will use relaxation strategies at least two times per day to reduce the physical symptoms of anxiety.  Status: Continued - Date(s): 2/3/2025    Intervention(s)  Therapist will teach relaxation  strategies such as mindfulness, deep breathing, muscle relaxation, and sensory activities.    Objective #C  Client will use cognitive strategies identified in therapy to challenge anxious thoughts.  Status: Continued - Date(s): 2/3/2025    Intervention(s)  Therapist will teach strategies for cognitive modification using REBT model.    Goal 3: Client will report improved mood as indicated by PHQ-9 score below 6 for consistent 8 weeks.    Objective #A (Client Action)    Status: Continued - Date: 2/3/2025    Client will Increase interest, engagement, and pleasure in doing things.    Intervention(s)  Therapist will assign homework for daily involvement in pleasant activities.    Objective #B  Client will Identify negative self-talk and behaviors: challenge core beliefs, myths, and actions.    Status: Continued - Date(s): 2/3/2025    Intervention(s)  Therapist will teach strategies for cognitive modification using REBT models.    Objective #C  Client will Improve quantity and quality of night time sleep / decrease daytime naps.  Status: Continued - Date(s): 2/3/2025    Intervention(s)  Therapist will teach sleep hygiene strategies.  Assign homework for daily practice.    Goal 4: Client will report reduced or eliminated physiological activation when recalling a distressing event including decreased re-experiencing, decreased avoidance, and decreased hyperarousal.    Objective #A (Client Action)    Status:  Continued: 2/3/25       Client will acquire knowledge of trauma, common symptoms post-trauma, emotion regulation strategies, stress management strategies, and cognitive coping strategies.    Intervention(s)  Therapist will teach about effects of trauma on mind and body; encourage emotion identification and expression; practice with client the stress management strategies; and teach cognitive modification.    Objective #B  Client will use Brainspotting while focusing on physiological sensations related to distressing  events.  Client will assess and rate level of physiological activation.  Status: Continued - Date(s): 2/3/2025    Intervention(s)  Therapist will provide use a pointer or other materials to assist client in holding a brainspot in their visual field.  Therapist might also provide biolateral music through headphones to deepen the experience.         Patient has reviewed and agreed to the above plan.      Erika Colorado, LMFT  July 17, 2024

## 2025-02-17 NOTE — TELEPHONE ENCOUNTER
Received request for a refill(s) of oxyCODONE (ROXICODONE) 5 MG tablet      Last dispensed from pharmacy on 02/06/25    Patient's last office/virtual visit by prescribing provider on 12/31/24  Next office/virtual appointment scheduled for 04/02/25    Last urine drug screen date 08/01/24  Current opioid agreement on file (completed within the last year) Yes Date of opioid agreement: 08/01/24    E-prescribe to pharmacy-[id number] [place], [place] - [address]     Will route to [facility] for review and preparation of prescription(s).

## 2025-02-17 NOTE — TELEPHONE ENCOUNTER
Winnie GENAO Pain Nurse (supporting Axel Wiggins MD)8 hours ago (11:11 PM)       Refills have been requested for the following medications:         oxyCODONE (ROXICODONE) 5 MG tablet [AXEL WIGGINS]     Preferred pharmacy: [id number] [facility] [address] [facility]

## 2025-02-18 ENCOUNTER — MYC MEDICAL ADVICE (OUTPATIENT)
Dept: PSYCHIATRY | Facility: CLINIC | Age: 70
End: 2025-02-18
Payer: MEDICARE

## 2025-02-19 ENCOUNTER — ANESTHESIA EVENT (OUTPATIENT)
Dept: BEHAVIORAL HEALTH | Facility: CLINIC | Age: 70
End: 2025-02-19

## 2025-02-19 ENCOUNTER — ANESTHESIA (OUTPATIENT)
Dept: BEHAVIORAL HEALTH | Facility: CLINIC | Age: 70
End: 2025-02-19
Payer: MEDICARE

## 2025-02-19 ENCOUNTER — HOSPITAL ENCOUNTER (OUTPATIENT)
Dept: BEHAVIORAL HEALTH | Facility: CLINIC | Age: 70
Discharge: HOME | End: 2025-02-19
Attending: PSYCHIATRY & NEUROLOGY
Payer: MEDICARE

## 2025-02-19 VITALS
RESPIRATION RATE: 18 BRPM | TEMPERATURE: 98.4 F | OXYGEN SATURATION: 93 % | DIASTOLIC BLOOD PRESSURE: 61 MMHG | HEART RATE: 72 BPM | SYSTOLIC BLOOD PRESSURE: 111 MMHG

## 2025-02-19 DIAGNOSIS — F33.2 SEVERE RECURRENT MAJOR DEPRESSION WITHOUT PSYCHOTIC FEATURES (H): Primary | ICD-10-CM

## 2025-02-19 PROCEDURE — 250N000011 HC RX IP 250 OP 636: Performed by: PSYCHIATRY & NEUROLOGY

## 2025-02-19 PROCEDURE — 370N000017 HC ANESTHESIA TECHNICAL FEE, PER MIN: Performed by: ANESTHESIOLOGY

## 2025-02-19 PROCEDURE — 250N000009 HC RX 250: Performed by: ANESTHESIOLOGY

## 2025-02-19 PROCEDURE — 250N000011 HC RX IP 250 OP 636: Performed by: ANESTHESIOLOGY

## 2025-02-19 PROCEDURE — 90870 ELECTROCONVULSIVE THERAPY: CPT

## 2025-02-19 RX ORDER — DEXAMETHASONE SODIUM PHOSPHATE 4 MG/ML
4 INJECTION, SOLUTION INTRA-ARTICULAR; INTRALESIONAL; INTRAMUSCULAR; INTRAVENOUS; SOFT TISSUE
Status: DISCONTINUED | OUTPATIENT
Start: 2025-02-19 | End: 2025-02-20 | Stop reason: HOSPADM

## 2025-02-19 RX ORDER — ONDANSETRON 2 MG/ML
4 INJECTION INTRAMUSCULAR; INTRAVENOUS EVERY 30 MIN PRN
Status: DISCONTINUED | OUTPATIENT
Start: 2025-02-19 | End: 2025-02-20 | Stop reason: HOSPADM

## 2025-02-19 RX ORDER — NALOXONE HYDROCHLORIDE 0.4 MG/ML
0.1 INJECTION, SOLUTION INTRAMUSCULAR; INTRAVENOUS; SUBCUTANEOUS
Status: DISCONTINUED | OUTPATIENT
Start: 2025-02-19 | End: 2025-02-20 | Stop reason: HOSPADM

## 2025-02-19 RX ORDER — METHOHEXITAL IN WATER/PF 100MG/10ML
SYRINGE (ML) INTRAVENOUS PRN
Status: DISCONTINUED | OUTPATIENT
Start: 2025-02-19 | End: 2025-02-19

## 2025-02-19 RX ORDER — KETOROLAC TROMETHAMINE 30 MG/ML
30 INJECTION, SOLUTION INTRAMUSCULAR; INTRAVENOUS ONCE
Status: COMPLETED | OUTPATIENT
Start: 2025-02-19 | End: 2025-02-19

## 2025-02-19 RX ORDER — LABETALOL HYDROCHLORIDE 5 MG/ML
INJECTION, SOLUTION INTRAVENOUS PRN
Status: DISCONTINUED | OUTPATIENT
Start: 2025-02-19 | End: 2025-02-19

## 2025-02-19 RX ORDER — SODIUM CHLORIDE, SODIUM LACTATE, POTASSIUM CHLORIDE, CALCIUM CHLORIDE 600; 310; 30; 20 MG/100ML; MG/100ML; MG/100ML; MG/100ML
INJECTION, SOLUTION INTRAVENOUS CONTINUOUS
Status: DISCONTINUED | OUTPATIENT
Start: 2025-02-19 | End: 2025-02-20 | Stop reason: HOSPADM

## 2025-02-19 RX ORDER — ONDANSETRON 4 MG/1
4 TABLET, ORALLY DISINTEGRATING ORAL EVERY 30 MIN PRN
Status: DISCONTINUED | OUTPATIENT
Start: 2025-02-19 | End: 2025-02-20 | Stop reason: HOSPADM

## 2025-02-19 RX ORDER — KETOROLAC TROMETHAMINE 30 MG/ML
30 INJECTION, SOLUTION INTRAMUSCULAR; INTRAVENOUS ONCE
Start: 2025-02-19 | End: 2025-02-19

## 2025-02-19 RX ADMIN — LABETALOL HYDROCHLORIDE 20 MG: 5 INJECTION, SOLUTION INTRAVENOUS at 11:55

## 2025-02-19 RX ADMIN — Medication 100 MG: at 11:55

## 2025-02-19 RX ADMIN — KETOROLAC TROMETHAMINE 30 MG: 30 INJECTION, SOLUTION INTRAMUSCULAR at 11:42

## 2025-02-19 RX ADMIN — SUCCINYLCHOLINE CHLORIDE 90 MG: 20 INJECTION, SOLUTION INTRAMUSCULAR; INTRAVENOUS; PARENTERAL at 11:55

## 2025-02-19 ASSESSMENT — COPD QUESTIONNAIRES: COPD: 1

## 2025-02-19 NOTE — ANESTHESIA CARE TRANSFER NOTE
Patient: Randi Cleary    Procedure: * No procedures listed *  Electroconvulsive Therapy    Diagnosis: * No pre-op diagnosis entered *  Diagnosis Additional Information: No value filed.    Anesthesia Type:   General     Note:    Oropharynx: oropharynx clear of all foreign objects  Level of Consciousness: awake  Oxygen Supplementation: room air    Independent Airway: airway patency satisfactory and stable  Dentition: dentition unchanged  Vital Signs Stable: post-procedure vital signs reviewed and stable  Report to RN Given: handoff report given  Patient transferred to: PACU    Handoff Report: Identifed the Patient, Identified the Reponsible Provider, Reviewed the pertinent medical history, Discussed the surgical course, Reviewed Intra-OP anesthesia mangement and issues during anesthesia, Set expectations for post-procedure period and Allowed opportunity for questions and acknowledgement of understanding      Vitals:  Vitals Value Taken Time   /85 02/19/25 1200   Temp 36.8  C (98.2  F) 02/19/25 1200   Pulse 68 02/19/25 1200   Resp 16 02/19/25 1200   SpO2 92 % 02/19/25 1200       Electronically Signed By: Robby Carcamo MD  February 19, 2025  12:08 PM

## 2025-02-19 NOTE — PROGRESS NOTES
Pt has met discharge criteria. VSS.  PIV removed without any issue.  Pt assisted into wheelchair with staff.  Discharge instructions given to her friend Aleida over the phone and she verbalized understanding.

## 2025-02-19 NOTE — ANESTHESIA POSTPROCEDURE EVALUATION
Patient: Randi Cleary    Procedure: * No procedures listed *  Electroconvulsive Therapy    Anesthesia Type:  General    Note:  Disposition: Outpatient   Postop Pain Control: Uneventful            Sign Out: Well controlled pain   PONV: No   Neuro/Psych: Uneventful            Sign Out: Acceptable/Baseline neuro status   Airway/Respiratory: Uneventful            Sign Out: Acceptable/Baseline resp. status   CV/Hemodynamics: Uneventful            Sign Out: Acceptable CV status; No obvious hypovolemia; No obvious fluid overload   Other NRE: NONE   DID A NON-ROUTINE EVENT OCCUR?            Last vitals:  Vitals:    02/19/25 1200 02/19/25 1215 02/19/25 1230   BP: (!) 159/85 (!) 165/87 111/61   Pulse: 68 76 72   Resp: 16 17 18   Temp: 36.8  C (98.2  F) 37  C (98.6  F) 36.9  C (98.4  F)   SpO2: 92% 92% 93%       Electronically Signed By: Robby Carcamo MD  February 19, 2025  12:30 PM

## 2025-02-19 NOTE — ANESTHESIA PREPROCEDURE EVALUATION
Anesthesia Pre-Procedure Evaluation    Patient: Randi Cleary   MRN: [id number] : 1955        Procedure :   Electroconvulsive Therapy       Past Medical History:   Diagnosis Date    Bipolar 2 disorder (H)     Breast cancer (H)     lumpectomy, radiation, chemo    Chronic pain syndrome     COPD (chronic obstructive pulmonary disease) (H)     asthma    Cord compression (H) 2021    Dizzy     Drug tolerance     opioid    Esophageal reflux     Fatigue     Generalized anxiety disorder     Graves disease 1994    Hemochromatosis 2018    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; H63D not detected    History of corticosteroid therapy 2019    History of partial adrenalectomy 2019    Hx antineoplastic chemotherapy     Hx of radiation therapy     Hyperlipidaemia     Hypertension     Impaired fasting glucose     Infection due to 2019 novel coronavirus 2021    Injury of neck, whiplash 07/15/2021    Joint pain     KYAW (obstructive sleep apnea) 2016    Osteopenia     Paroxysmal atrial fibrillation (H) 2024    Pheochromocytoma, left 2017    laparoscopically removed    Postablative hypothyroidism 1995    Prediabetes 10/03/2019    by A1c    Psoriasis     Psoriatic arthropathy (H)     Pulmonary hypertension (H)     Right rotator cuff tear     RLS (restless legs syndrome)     on ropinorole    Sacroiliitis     Serotonin syndrome 2020    [facility] - While on desvenlafaxine 100mg    Snoring     Spinal stenosis     Status post coronary angiogram 10/03/2019    Urinary incontinence     Vitamin B 12 deficiency     Vitamin D deficiency       Past Surgical History:   Procedure Laterality Date    ARTHRODESIS ANKLE      ARTHROPLASTY ANKLE Right 2015    Procedure: ARTHROPLASTY ANKLE;  Surgeon: Jason Coughlin MD;  Location: [facility]    ARTHROPLASTY REVISION ANKLE Right 2015    Procedure: ARTHROPLASTY REVISION ANKLE;  Surgeon: Jason Coughlin MD;  Location: [facility]    BIOPSY  BREAST      BREAST BIOPSY, CORE RT/LT      COLONOSCOPY      COLONOSCOPY N/A 2/25/2021    Procedure: COLONOSCOPY;  Surgeon: Guru Elke Tolbert MD;  Location:  GI    CV CORONARY ANGIOGRAM N/A 10/3/2019    Procedure: CV CORONARY ANGIOGRAM;  Surgeon: Bryce Pierre MD;  Location:  HEART CARDIAC CATH LAB    CV RIGHT HEART CATH MEASUREMENTS RECORDED N/A 10/3/2019    Procedure: CV RIGHT HEART CATH;  Surgeon: Bryce Pierre MD;  Location:  HEART CARDIAC CATH LAB    CV RIGHT HEART CATH MEASUREMENTS RECORDED N/A 9/25/2024    Procedure: Heart Cath Right Heart Cath;  Surgeon: George Becker MD;  Location:  HEART CARDIAC CATH LAB    ESOPHAGOSCOPY, GASTROSCOPY, DUODENOSCOPY (EGD), COMBINED N/A 2/25/2021    Procedure: ESOPHAGOGASTRODUODENOSCOPY, WITH BIOPSY;  Surgeon: Guru Elke Tolbert MD;  Location:  GI    EYE SURGERY  2021    HC REMOVE TONSILS/ADENOIDS,<13 Y/O      Description: Tonsillectomy With Adenoidectomy;  Recorded: 04/07/2010;    IR LUMBAR EPIDURAL STEROID INJECTION  10/26/2004    IR LUMBAR EPIDURAL STEROID INJECTION  11/16/2004    IR LUMBAR EPIDURAL STEROID INJECTION  12/21/2004    IR LUMBAR EPIDURAL STEROID INJECTION  6/8/2006    JOINT REPLACEMENT      LAMINOPLASTY CERVICAL POSTERIOR THREE+ LEVELS Left 12/21/2021    Procedure: CERVICAL 3-CERVICAL 6 LEFT OPEN DOOR LAMINOPLASTY AND LEFT CERVICAL 4-5 AND CERVICAL 6-7 POSTERIOR FORAMINOTOMY;  Surgeon: Angela Gregory MD;  Location: [facility] OR    LAPAROSCOPIC ADRENALECTOMY Left 08/02/2017    pheochromocytoma    LAPAROSCOPIC ADRENALECTOMY Left 8/2/2017    Procedure: LAPAROSCOPIC LEFT ADRENALECTOMY, ;  Surgeon: Gab Linares MD;  Location: [facility];  Service:     LENGTHEN TENDON ACHILLES Right 6/29/2015    Procedure: LENGTHEN TENDON ACHILLES;  Surgeon: Jason Coughlin MD;  Location: [facility]    LUMPECTOMY BREAST      LUMPECTOMY BREAST Left 1994    MAMMOPLASTY REDUCTION Right 01/13/2015     "[place]    MAMMOPLASTY REDUCTION Right     approx late /[id number]    MASTECTOMY      left lumpectomy with axillary node dissection    MASTECTOMY MODIFIED RADICAL      OTHER SURGICAL HISTORY Right     reconstructive breast surgery    OTHER SURGICAL HISTORY      Adrenalectomy for pheochromocytoma    DC MASTECTOMY, MODIFIED RADICAL      Description: Modified Radical Mastectomy Left Breast;  Recorded: 2010;    REPAIR HAMMER TOE Right 2015    Procedure: REPAIR HAMMER TOE;  Surgeon: Jason Coughlin MD;  Location: [facility]    TONSILLECTOMY      TONSILLECTOMY & ADENOIDECTOMY      ZZC ARTHRODESIS,ANKLE,OPEN Right     Description: Ankle Arthrodesis;  Recorded: 2010;      Allergies   Allergen Reactions    Serotonin Reuptake Inhibitors (Ssris) Anxiety, Difficulty breathing, Headache, Palpitations and Shortness Of Breath    Buspirone      The patient states she had serotonin syndrome    Cephalexin      Other reaction(s): unknown rxn.    Desvenlafaxine      Serotonin syndrome    Diclofenac Sodium [Diclofenac]      Serotonin syndrome and restless legs syndrome    Gabapentin      Drove on the wrong side of the highway    Levofloxacin      \"CAN'T REMEMBER\"    Penicillins      \"SORES IN MOUTH\"    Riluzole Difficulty breathing and Swelling    Sulfa Antibiotics      \"PT DOES NOT KNOW WHAT THE REACTION WAS\"    Topiramate Other (See Comments)     Frequent urination    Dextromethorphan Anxiety      Social History     Tobacco Use    Smoking status: Former     Current packs/day: 0.00     Average packs/day: 2.5 packs/day for 29.2 years (72.9 ttl pk-yrs)     Types: Cigarettes     Start date: 1971     Quit date: 2000     Years since quittin.6     Passive exposure: Current    Smokeless tobacco: Never   Substance Use Topics    Alcohol use: Not Currently     Comment: relapse 2021 sober       Wt Readings from Last 1 Encounters:   25 78 kg (172 lb)        Anesthesia Evaluation   Pt has had prior " anesthetic.     History of anesthetic complications       ROS/MED HX  ENT/Pulmonary:     (+) sleep apnea,                         COPD,              Neurologic:       Cardiovascular:     (+)  hypertension- -   -  - -                                pulmonary hypertension,      METS/Exercise Tolerance:     Hematologic:       Musculoskeletal:       GI/Hepatic:     (+) GERD,     hiatal hernia,              Renal/Genitourinary:       Endo:     (+)          thyroid problem,     Obesity,       Psychiatric/Substance Use:       Infectious Disease:       Malignancy:       Other:               OUTSIDE LABS:  CBC:   Lab Results   Component Value Date    WBC 6.9 12/19/2024    WBC 4.4 11/23/2024    HGB 17.8 (H) 12/19/2024    HGB 14.9 11/23/2024    HCT 51.1 (H) 12/19/2024    HCT 43.2 11/23/2024     12/19/2024     11/23/2024     BMP:   Lab Results   Component Value Date     12/19/2024     11/23/2024    POTASSIUM 4.9 12/19/2024    POTASSIUM 3.6 11/23/2024    CHLORIDE 103 12/19/2024    CHLORIDE 110 (H) 11/23/2024    CO2 27 12/19/2024    CO2 25 11/23/2024    BUN 14.2 12/19/2024    BUN 12.0 11/23/2024    CR 0.65 12/19/2024    CR 0.64 11/23/2024    GLC 91 12/19/2024     (H) 11/23/2024     COAGS:   Lab Results   Component Value Date    PTT 34 12/13/2021    INR 0.99 11/23/2024     POC:   Lab Results   Component Value Date     (H) 02/25/2021     HEPATIC:   Lab Results   Component Value Date    ALBUMIN 4.5 12/19/2024    PROTTOTAL 7.7 12/19/2024    ALT 15 12/19/2024    AST 28 12/19/2024    ALKPHOS 75 12/19/2024    BILITOTAL 0.5 12/19/2024     OTHER:   Lab Results   Component Value Date    A1C 5.4 02/12/2025    LINDA 9.9 12/19/2024    MAG 1.9 11/22/2024    TSH 0.50 02/12/2025    T4 1.49 02/12/2025    T3 114 01/18/2023    CRP <2.9 11/16/2021    SED 8 10/17/2023       Anesthesia Plan    ASA Status:  3    NPO Status:  NPO Appropriate    Anesthesia Type: General.     - Airway: Mask Only   Induction:  "Intravenous.           Consents            Postoperative Care            Comments:               Robby Carcamo MD    I have reviewed the pertinent notes and labs in the chart from the past 30 days and (re)examined the patient.  Any updates or changes from those notes are reflected in this note.    Clinically Significant Risk Factors Present on Admission                             # Overweight: Estimated body mass index is 28.62 kg/m  as calculated from the following:    Height as of 2/12/25: 1.651 m (5' 5\").    Weight as of 2/12/25: 78 kg (172 lb).                "

## 2025-02-19 NOTE — DISCHARGE INSTRUCTIONS
ECT Discharge Instructions      During your ECT series:    Do not drive or work heavy equipment until 7 days after your last treatment.  Do not drink alcohol or use street drugs (illicit drugs) while you are having treatments.  Do not make important decisions, including legal decisions.    After each treatment:    Get plenty of rest. A responsible adult must stay with your for at least 6 hours.  Avoid heavy or risky activities for 24 hours while in maintenance ECT.   Do not drive for at least 24 hours after your treatment while in maintenance ECT.   If you have more than one treatment within one week, do not drive for 7 days after your last treatment.   If you feel light-headed, sit for a few minutes before standing. Have someone help you get up.  If you have nausea (feel sick to your stomach): Drink only clear liquids such as apple juice, ginger ale, broth or 7UP, Be sure to drink plenty of liquids. Move to a normal diet as you feel able.   If you received Toradol, wait 6 hours before taking ibuprofen.  Call your doctor if:   You have a fever over 100F (37.7 C) (taken under the tongue), or a fever that last more than 24 hours.  Your IV site is red, swollen, very painful or is getting more tender.  You have nausea that gets very bad or does not improve.    If you have any symptoms after ECT, tell our staff before your next treatment.  The ECT Department can be reached at [phone].  The ECT Department is open Mondays, Wednesdays and Fridays from 7:00 AM to 2:00 PM.    To speak to a doctor, call:  Your primary care provider or [facility] for Dr. Riley, Dr. Beavers, Dr. Hutchison or Dr. Cotter PHONE: [phone]; fax: [phone]    New instructions:  Return for ECT in two weeks.  A  will call you to schedule this appointment.    Your next ECT appointment will now be scheduled by a scheduling service. They should call you within 24 hours of your ECT appointment. For any urgent scheduling needs  or to change your appointment, please call the ECT department at [phone] and they will get in touch with scheduling for you.     You have received Toradol today at 11:42 AM. Toradol is an NSAID.  Do not take any NSAID's including: Ibuprofen, Naproxen Sodium, Asprin, Advil, Motrin, Aleve, Shannan, etc., until six hours after the above time which would be 5:42 PM. If you have any questions check back with your Physician, or pharmacist.     Covid-19 Testing:  We are no longer requiring covid tests prior to your procedure. If you are ill, please call the ECT department to discuss plan for rescheduling your appointment. [phone]    NEW: Please come to the [facility] entrance at [address] [place] [address] and check in at Room NB14. You will receive your wristband and stickers there and can then come down to the ECT department in the basement of the [facility].       After your appointment, you may be picked up at the [facility] entrance, which is located at [address] [place] [address].  [facility], about 1 block North of the [facility] entrance.    Transported by:   Aleida (friend)    Reviewed AVS discharge instructions with:   Aleida (friend)

## 2025-02-19 NOTE — PROCEDURES
"  Procedures  [facility], [place]   ECT Procedure Note   02/19/2025    Randi Cleary is a 68 year old female patient.  [id number]    Patient Status: outpatient    Is this the first in a series of 12 treatments?  No      Allergies   Allergen Reactions    Serotonin Reuptake Inhibitors (Ssris) Anxiety, Difficulty breathing, Headache, Palpitations and Shortness Of Breath    Buspirone      The patient states she had serotonin syndrome    Cephalexin      Other reaction(s): unknown rxn.    Desvenlafaxine      Serotonin syndrome    Diclofenac Sodium [Diclofenac]      Serotonin syndrome and restless legs syndrome    Gabapentin      Drove on the wrong side of the highway    Levofloxacin      \"CAN'T REMEMBER\"    Penicillins      \"SORES IN MOUTH\"    Riluzole Difficulty breathing and Swelling    Sulfa Antibiotics      \"PT DOES NOT KNOW WHAT THE REACTION WAS\"    Topiramate Other (See Comments)     Frequent urination    Dextromethorphan Anxiety       Weight:  0 lbs 0 oz / 0 kg          Indications for ECT:   Medications ineffective and Psychotherapies ineffective         Clinical Narrative:   HPI - The patient describes a lifelong history of depression dating back to elementary school, worsening after her father's death when she was 13yo and especially in the 1980s in the setting of worsening physical health (since falling and breaking her ankle), which led to the the initiation of fluoxetine, her first antidepressant.  Her history has been primarily characterized by low mood, with some ups and downs but no extended depression-free periods since then.  She has tried many different medications from different classes, but cannot recall any one being particularly helpful for her.  She has experienced past episodes of particularly irritable mood and concomitant decreased sleep need, although most of these have lasted 1-2 days and triggered by anger related to external stressors.  She has at least one " "episode of a more extended episode of elevated mood (and associated grandiosity, increased spending, flight of ideas, increased goal directed behaviors, pressured speech, and odd and embarrassing behavior), which occurred in the context of relapse on alcohol in 2021. She does not endorse any events before about age 48.     The patient's depression is characterized by near daily low mood, frequent anhedonia, with sleep difficulties (although c/b pain, KYAW, and RLS), fatigue, feelings of guilt/worthlessness, and impaired concentration.  She reports feelings of \"despair,\" at times with active SI with plan, but denies any intent to act on this - \"maybe it's hope.\"  She has 1 lifetime suicide attempt (overdose) in the 1980s, for which she was psychiatrically hospitalized.  She has a remote history of SIB (cutting) but not recently.       Psych pertinent item history includes includes suicide attempt , suicidal ideation, SIB , aggression, trauma hx, substance use: alcohol, cannabis, and Patient has a history of alcohol dependence treated 20+ years ago and she relapsed in 2020 for a couple of months, in her 20s she\" loved getting high\" on cocaine, LSD, mushrooms, speed, white cross, mutiple psychotropic trials , psych hosp, ketamine, and major medical problems.    Target Symptoms for Improvement: Amotivation, Fatigue, Improved self-image and Panic attacks         Diagnosis:   Major depression         Assessment:   #1 05/24/24 Some circumstantial thought, needs some redirection, 3.5 hours sleep last night, anxious, mood 1/10, PDW but no SI, never had ECT before - only TMS. No AVH.   #2 05/29/24  Mood 4/10, better over w/e, some word find difficulties, no AVH/SI/PDW. Chronic sciatica pain, neck and shoulder pain - didn't worsen with ECT. Mild headache.   #3 05/31/24  Mood 4/10, No SI, PDW, AVH, some wrist pain and headache, memory might be improving.    #4 6/3/24  Phq-9= 13, mood 3/10.  Yesterday was very tearful, still a " "bit today.  Last treatment had awareness under paralysis.  Otherwise, some initial confusion after first ECT but no subsequent cognitive effects.  Signed consent to continue.  #5 6/5/24 Mood 4-5/10  She feels like her \"rage\" is less and she feels lighter. but no cognitive side effects. She complains of excessive sweating and is concerned her thryoid is unbalanced. She is somatically focused She reports pain on the side of her neck radiating down to her chest. She had a migraine she thinks over the weekend which usual starts as occipital tension.  #6 6/7/24 mood 4-5/10. No SI. She does have some baseline long term memory difficulties no AVH.   #7 6/10/24  She still is worried that She is not able to go home. She had visitors this weekend who said \"you don't seem to have the weight of the world on your shoulders anymore\" she disagrees she had a downward spiral over the weekend when there was a mix up with her clothes and she usually feels tearful after ECT. She does not report anything feeling worse. She gets down on herself when she has an anxiety spiral and it was the 1st one she has had since admission.   #8 6/12/24 Winnie reported some tearfulness overnight. She is noticing a weight lifting mood 6/10 . Reports some difficulty with remembering names but believes this is at baseline.   #9 6/14/24 Mood 5/10 (10 best) She feels like she is improving each time . She still has occasional crying spells but she feels she bounces back quicker. She is still anxious about going home. No Si. Tolerating ECT  #10 06/17/24 mood 5/10 She does feellike she has gotten some improvement since starting Ect but still has times where she gets tearful and anxious \"goes down the rabit hole\" but not as often . She has had ketamine troches before with pain  management to no effect but wonders about ketamine infusions.  #11 06/19/24 Mood today is 5/10. Patient is wondering whether she could do a ketamine course (did have ketamine in the past), " "despite of being on opioids. Feels that ketamine can help with \"anger, tearing and anxiety.\" She complains to the anesthesiologist about recent urinary incontinence which needs to be considered when  using ketamine. She wants to try it for anger ,tearing and anxiety which is not a standard indication  #12 06/21/24 Mood 5/10, no PDW/SI, but some anxiety around pain this AM.  Patient is interested in maintenance ECT at her attending psychiatrist's recommendation to prevent the possibility of her mood dipping as low as it did previously that led to her hospitalization.  Discussed pros and cons of maintenance ECT, suggested alternative of maintenance medications/therapy and/or watchful waiting with possibility of retreatment should she relapse, as well as alternate interventions including ketamine.  At the moment, she is most interested in pursuing maintenance ECT - will plan for next Friday pending confirmation with her family that she has post-ECT ride and monitoring.  M1 06/28/24 Mood ups and downs, irritability, anxiety, sadness switching multiple times a day since being discharged home.  Had difficulty with the transition home, was harder than she thought it would be.  However, still able to \"push away\" PDW/SI, and did not have periods of persistently low mood this past week.  Has noticed some anterograde and retrograde amnesia of the 1-2 months prior to starting ECT.  Will continue to track.  Today, mood 6/10 \"now that I'm at ECT.\"  M2 07/05/24 She was tearful, states that she doesn't feel good, \"starting to drop\", states that the mood change happened in Wednesday somewhat suddenly, when she encountered several business stressors and felt overwhelmed. Mood 3-4/10. Denies SI and feels safe at home. Still reports memory issues. Reports that the day program has been overwhelming.    She cancelled her colonoscopy in order to focus on her right heart cath the next week. She feels like she has too many procedures " scheduled and pushed off her sleep study also.  M3 7/12/24 Doing well.  Somewhat stressed witht all the medical appointments she has to coordinate. Her colonoscopy got cancelled because of her upcoming appointment with cardiology. Canceled the outpatient program but interested in doing the group therapy at [facility].   M4 7/19/24 Doing well. Less frustrated and started therapy. Reports short term memory impairment. That said, she is hesitant to space ECT to [id number]  M5 8/2/24 Mood 5-6/10 she struggles some with the interval felling more irritable and tearful the second week. She felt some Si yesterday because she was frustrated and overehwlemd but no SI today. Cogntiively processing speed is affected and does better if she can get a hint. Encouraged her to take her viibryd as prescribed  M6 08/16/24  She is having headaches she attributes to the viibryd and encouraged her to adhere. She reports she still has lability between session lots of stressors with medical billing errors and feeling like she is hounded by collections mood 4/10. Tolerating Ect without complaints of cognitive side effects. Spent birthday in still water   M7 09/04/24  called earlier today with plan to cancel because she was feeling overwhelmed and had poor sleep the night before. Encouraged her to attend . She was very stressed because her electricity was out for mo than a week and her internet is out now . She still gets many incorrect medical bills which are very stressful  some trouble with naming songs on the radio  M8 09/13/24 Winnie is focused on her upcoming shoulder surgery and wants to make sure we talk about ECT and her recovery period. Will meet with surgeon in October and plan surgery in november. Mood is struggling because insomnia is still a problem despite low dose of trazodone    M9 09/27/24  she discontinued her viibryd as she attributes insomnia to that medication. Encourages her to start [facility] which [facility]ational  "antidepressant. She had a successful cath lab visit and went out to celebrate and ended up having a fall and hit her head and was worked-up in the ED.  She reports she was tearful yesterdya she is still having mood dips in between sessions so not comfortable spacing out until she is re-established on antidepressant  M10 10/11/24: Mood is doing relatively well but has had some difficulties recovering after the fall, difficulty breathing attributed to bruised rib.  Now has rash at site of COVID vaccine from Wed, warm and red c/f cellulitis.  Trying to start Auvelity in parts due to lack of coverage - hasn't happened yet.  Will continue [id number] maintenance while stabilizing meds and awaiting ortho surgery.  Mood: 7/10 (10=best), PHQ-9: 9    M11 10/25/24: Patient is feeling overwhelmed by medical appointments and commitments, has had worsening irritability related to this.  Recognizes that her mood is still overall better, but these acute stressors lead to an up-and-down over the course of the week, and there have been more downs this week.  Started faux-velity, some headaches but hoping these will resolve.  She is concerned that she won't be able to make it 6 weeks after her ortho surgery without ECT, but will continue to discuss.  Denies PDW/SI - \"I've done a reassessment\" and recognizes these stressors make things hard but \"I used to love life.\"  Denies adverse effects other than insomnia night before treatment due to holding Neurontin - will ask about alternate sleep options.   M12 Mood 6-7/10 going to group. Got good news from right heart cath. She has word finding difficulty. Wants to take up Mahjong. Got out into her yard for the first time in a long while. No falls. She doesn't like abilify thinks it is causing tinnitus and jaw clenching but will try to keep for now. PHQ-9=11 QIDS=18  M13 11/22/24: Mood has been \"not great\" the last two weeks, in setting of poor sleep, acute psychosocial stressors, medications " "changes, and cutting down on cannabis.  Wants to talk to primary psychiatrist about better control for sleep.  Does still feel that clarity of thought and motivation remain high overall, ECT still helping.  Will keep q2week ECT for now, discussed trial at 3    Complicated by: new onset Afib, regular rate -> referred to ED  M14 12/13/24: Delayed scheduled visit last week due to need for Cardiac clearance following Afib from last treatment.  Saw cardiology, who cleared patient to continue without anticoagulation due to CHADSVASC = 2.  Today, mood started to drift about a week ago, most notably irritability, but not the worst it's been.  Denies PDW/SI.  She is anticipating her surgery 1/8/24, and would like to do a treatment in 2 weeks \"as usual\" and then ideally on 1/6/24 right before the surgery.  We will book next treatment in 2 weeks, but patient to call if she's doing well and okay to space additional week.  Mood: 6/10 (10=best), PHQ-9: 15      01/22/25  Returns today after prolonged interval because she needed repeat medical clearance due to multiple ED visits for falls and dizziness. She discontinued her oral antidepressants. She feels irritable anxious overwhelmed by medical complications. Cancelled her shoulder surgery. She blames serotonin for her dizziness and falls and gillespie snot want to take psychotropic medication she thinks ECT is the most helpful for her. Mood 2/10. Showered 3-4 x a week sitz bath and sink hair wash on other days, eating 2 meals a day managing meds but difficulty initing decluttering tasks as she is a hoarder. Passive SI \"Everything just feels so hard\" PHQ_9=12   M 02/05/25  She is anxious, struggling with early morning waking, but otherwise says ECT is doing well for her mood.  She wants to stay at 2 weeks.  No side effects, no SI.   M 02/19/25  Doing well overall, sleeping better, but got very distressed this week by the news. Is exercising, using lavender supplements to sleep, no " safety issues or ECT side effects. Has OP appt with Dr Varela on March 5th. PHQ-9=14.          Pause for the Cause:     Correct patient Yes   Correct procedure/laterality settings: Yes           Intra-Procedure Documentation:     ECT #: 29   Treatment number this series: M17   Total treatment number: 29     Type of ECT:  Right, unilateral ultrabrief    ECT Medications:    Toradol 30mg iv - for headache/myalgia     Brevital: 100 mg (incr from 90 mg as still awake)   Succinyl Choline: 90 mg    BP - per anesthesia team     ECT Strip Summary: RUL Titration #3: 38.4 mC   Energy Level:  384.0mC, 0.3 ms, 100 Hz, 8 sec, 800 mA     Motor Seizure Duration: 15 seconds  EEG Seizure Duration: 33 seconds      Complications: none      Plan:   - Plan for maintenance in 2 weeks    - Monitor depression severity with clinical assessment augmented with PHQ9 every other treatment  - Continue current medications    Discharge instructions:   -- Remember to NOT take gabapentin after 6pm the night before each treatment  -- During maintenance ECT, you can't drive for 24 hours after each treatment.      Dr ALPHONSO Beavers MD   Psychiatry

## 2025-02-21 ENCOUNTER — PATIENT OUTREACH (OUTPATIENT)
Dept: ONCOLOGY | Facility: CLINIC | Age: 70
End: 2025-02-21
Payer: MEDICARE

## 2025-02-21 DIAGNOSIS — E83.110 HEREDITARY HEMOCHROMATOSIS: Primary | ICD-10-CM

## 2025-02-21 NOTE — TELEPHONE ENCOUNTER
[facility]: Hematology                                                                                          Chart reviewed for upcoming appointment with Dr. Tova Pablo in hematology  on 2/25/25     Labs orders  entered based on previous labs ordered by Dr Pablo     Lab appointment scheduled for 2/25/25 prior to appointment     Spoke to patient on 2.21.25 and confirmed appointment  She has group therapy prior to hematology and plans to come to the [facility] and hang  out.  I encouraged her to get labs drawn as earliest as possible so more results can be back in time for appointment with Dr Bev Musa LPN  Hematology Care Coordination  [phone]

## 2025-02-24 ENCOUNTER — NURSE TRIAGE (OUTPATIENT)
Dept: INTERNAL MEDICINE | Facility: CLINIC | Age: 70
End: 2025-02-24
Payer: MEDICARE

## 2025-02-24 ENCOUNTER — VIRTUAL VISIT (OUTPATIENT)
Dept: PSYCHOLOGY | Facility: CLINIC | Age: 70
End: 2025-02-24
Payer: MEDICARE

## 2025-02-24 ENCOUNTER — VIRTUAL VISIT (OUTPATIENT)
Dept: INTERNAL MEDICINE | Facility: CLINIC | Age: 70
End: 2025-02-24
Payer: MEDICARE

## 2025-02-24 DIAGNOSIS — F33.2 MAJOR DEPRESSIVE DISORDER, RECURRENT SEVERE WITHOUT PSYCHOTIC FEATURES (H): Primary | ICD-10-CM

## 2025-02-24 DIAGNOSIS — F41.1 GENERALIZED ANXIETY DISORDER: ICD-10-CM
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
## 2025-02-24 DIAGNOSIS — Z12.11 SCREEN FOR COLON CANCER: Primary | ICD-10-CM

## 2025-02-24 PROCEDURE — 98014 SYNCH AUDIO-ONLY EST MOD 30: CPT | Performed by: NURSE PRACTITIONER

## 2025-02-24 PROCEDURE — 90837 PSYTX W PT 60 MINUTES: CPT | Mod: 95 | Performed by: MARRIAGE & FAMILY THERAPIST

## 2025-02-24 RX ORDER — METRONIDAZOLE 500 MG/1
500 TABLET ORAL 3 TIMES DAILY
Qty: 21 TABLET | Refills: 0 | Status: SHIPPED | OUTPATIENT
Start: 2025-02-24 | End: 2025-03-03

## 2025-02-24 RX ORDER — DOXYCYCLINE HYCLATE 100 MG
100 TABLET ORAL 2 TIMES DAILY
Qty: 14 TABLET | Refills: 0 | Status: SHIPPED | OUTPATIENT
Start: 2025-02-24 | End: 2025-03-03

## 2025-02-24 NOTE — PROGRESS NOTES
"Winnie is a 69 year old who is being evaluated via a billable video visit.    How would you like to obtain your AVS? MyChart  If the video visit is dropped, the invitation should be resent by: Send to e-mail at: [email or web address]  Will anyone else be joining your video visit? No      Assessment & Plan       Cat bite, initial encounter  She was brushing her cat 3 days ago when the cat became a bit agitated and bit her.    It sounds like the bite is not a puncture, more of a scratch type wound.  Light, not very deep.    Since the injury, the wound has developed some slight redness at the margins.  There is no mucopurulent drainage present.  She denies any fevers.  No streaking.    She has allergies to penicillin and sulfa drugs.  We decided to treat with doxycycline plus metronidazole today.    She can continue using topical antibiotics.  She does have a follow-up appointment with me next week.    If redness and swelling were to worsen or if she were to develop any fevers, or other signs of systemic illness she should present to the ED for consideration of IV antibiotics.    - doxycycline hyclate (VIBRA-TABS) 100 MG tablet; Take 1 tablet (100 mg) by mouth 2 times daily for 7 days.  - metroNIDAZOLE (FLAGYL) 500 MG tablet; Take 1 tablet (500 mg) by mouth 3 times daily for 7 days.          BMI  Estimated body mass index is 28.62 kg/m  as calculated from the following:    Height as of 2/12/25: 1.651 m (5' 5\").    Weight as of 2/12/25: 78 kg (172 lb).             Subjective   Winnie is a 69 year old, presenting for the following health issues:  Trauma (Cat bite, right wrist area, looks like a scratch, margins are red, happened 3 days ago, oozes onto bandage)      2/24/2025     1:41 PM   Additional Questions   Roomed by Alyssa GOODWIN     Video Start Time:  200 pm    Trauma    History of Present Illness       Reason for visit:  Infected cat bite   She is taking medications regularly.       She was bit by her cat last week.  The " cat is known to her.  Very low suspicion for rabies or other illnesses in the animal.    She was trying to brush her cat when it became agitated and bit her.    She has not had any fevers or other complications.  The lesion does look a bit red.  No streaking.      Objective           Vitals:  No vitals were obtained today due to virtual visit.    Physical Exam   GENERAL: alert and no distress  RESP: No audible wheeze, cough, or visible cyanosis.    SKIN: Visible skin clear. No significant rash, abnormal pigmentation or lesions.  NEURO: Cranial nerves grossly intact.  Mentation and speech appropriate for age.  PSYCH: Appropriate affect, tone, and pace of words          Video-Visit Details    Type of service:  Video Visit   Video End Time:2:07 PM  Originating Location (pt. Location): Home    Distant Location (provider location):  On-site  Platform used for Video Visit: Tara  Signed Electronically by: Kaushik Hobbs CNP

## 2025-02-24 NOTE — TELEPHONE ENCOUNTER
"Transferred back to scheduling line- pt needs appointment today with any provider at her clinic.  Should be able to be seen via video visit if no in person available.  If unable to be seen today needs to go to     Reason for Disposition   Puncture wound (hole through the skin) from cat (teeth or claws)    Additional Information   Negative: Major bleeding (actively dripping or spurting) that can't be stopped   Negative: Sounds like a life-threatening emergency to the triager   Negative: Human bite   Negative: Any break in skin from BITE (e.g., cut, puncture, or scratch) and WILD animal at risk for RABIES (e.g., bat, raccoon, hayes, skunk, coyote, other carnivores; see Background for list)   Negative: Any break in skin from BITE (e.g., cut, puncture, or scratch) and PET animal (e.g., dog, cat, or ferret) at risk for RABIES (e.g., sick, stray, unprovoked bite, developing country)   Negative: Any break in skin from BITE (e.g., cut, puncture, or scratch) and monkey   Negative: Cut (length > 1/8 inch or 3 mm) or skin tear and any animal  (Exception: Superficial scratch that doesn't go through dermis.)   Negative: Bleeding won't stop after 10 minutes of direct pressure (using correct technique)   Negative: EXPOSURE of non-intact skin (e.g., exposed person has dermatitis, abrasion, wound) with animal BODY FLUID (e.g., licking, blood, brain, saliva) and animal at high-risk for RABIES (e.g., bat, raccoon, hayes, skunk, coyote, other carnivores)   Negative: Sounds like a serious bite injury to the triager    Answer Assessment - Initial Assessment Questions  1. APPEARANCE \"What does it look like?\"  (e.g., abrasion, bruise, puncture)       Red and puffy  2. SIZE: \"How big is the bite?\" (e.g., inches, cm; or compare to size of coin, pea, grape, ping pong ball)       2-3 inches long  3. LOCATION: \"Where is the bite located?\"       Above right write  4. ONSET: \"When did the bite happen?\" (e.g., minutes, hours ago)       2 days   5. " "ANIMAL: \"What type of animal caused the bite?\" \"Is the injury from a bite or a claw?\" If the animal is a dog or a cat, ask: \"Was it a pet or a stray?\" \"Was it acting ill or behaving strangely?\"      Cat bite-  6. RABIES VACCINE: For dog or cat bites, ask: \"Do you know if the pet is vaccinated against rabies?\"  (e.g., yes, no, overdue for rabies shot, unknown)      Vaccine are current  7. CIRCUMSTANCES: \"Tell me how this happened.\"       Was brushing cat  8. TETANUS: \"When was your last tetanus booster?\"      A few years ago- 2023  9. PREGNANCY: \"Is there any chance you are pregnant?\" \"When was your last menstrual period?\"      N/a    Protocols used: Animal Bite-JANESSA Benjamin RN    Triage Nurse  [facility]      "

## 2025-02-24 NOTE — PROGRESS NOTES
[facility]                                     Progress Note    Patient Name: Randi Cleary  Date: 2025       Service Type: Individual      Session Start Time: 10:30  Session End Time: 11:27     Session Length: 53+    Session #: 19    Attendees: Client    Service Modality:  Video Visit:      Provider verified identity through the following two step process.  Patient provided:  Patient  and Patient address    Telemedicine Visit: The patient's condition can be safely assessed and treated via synchronous audio and visual telemedicine encounter.      Reason for Telemedicine Visit: Patient has requested telehealth visit    Originating Site (Patient Location): Patient's home    Distant Site (Provider Location): Provider Remote Setting- Home Office    Consent:  The patient/guardian has verbally consented to: the potential risks and benefits of telemedicine (video visit) versus in person care; bill my insurance or make self-payment for services provided; and responsibility for payment of non-covered services.     Patient would like the video invitation sent by:  My Chart    Mode of Communication:  Video Conference via Amwell    Distant Location (Provider):  Off-site    As the provider I attest to compliance with applicable laws and regulations related to telemedicine.    DATA  Interactive Complexity: No  Crisis: No        Progress Since Last Session (Related to Symptoms / Goals / Homework):   Symptoms: No change      Homework:  n/a      Episode of Care Goals: Satisfactory progress - ACTION (Actively working towards change); Intervened by reinforcing change plan / affirming steps taken     Current / Ongoing Stressors and Concerns:   Some improvement in sleep recently as she is experimenting with dosage of melatonin.  Concern about national news and the ongoing after effects.  Sometimes the worries are significantly overwhelming.     Treatment Objective(s) Addressed in This  "Session:   Distress tolerance     Intervention:   Validation, safety assessment.  Encouragement.  Small steps toward larger goals.  Piggy bank metaphor for \"collecting\" emotionally helpful experiences.  5-4-3-2-1 exercise for sensory awareness.    Assessments completed prior to visit:  The following assessments were completed by patient for this visit:  PHQ9:       1/2/2025     2:05 PM 1/23/2025     8:51 AM 1/28/2025     9:34 AM 2/3/2025    11:13 AM 2/10/2025    11:46 AM 2/17/2025     9:49 AM 2/24/2025     9:43 AM   PHQ-9 SCORE   PHQ-9 Total Score MyChart 24 (Severe depression) 15 (Moderately severe depression) 15 (Moderately severe depression) 11 (Moderate depression) 15 (Moderately severe depression) 16 (Moderately severe depression) 15 (Moderately severe depression)   PHQ-9 Total Score 24  15  15  11  15  16  15        Patient-reported     GAD7:       10/8/2024     6:29 AM 10/22/2024     8:59 AM 11/4/2024     2:38 PM 12/3/2024     9:10 AM 12/17/2024     9:13 AM 1/23/2025     8:47 AM 2/10/2025    10:22 PM   CHAVO-7 SCORE   Total Score 9 (mild anxiety) 11 (moderate anxiety) 10 (moderate anxiety) 17 (severe anxiety) 13 (moderate anxiety) 11 (moderate anxiety) 14 (moderate anxiety)   Total Score 9    9 11    11 10  17  13  11  14        Patient-reported     CAGE-AID:       6/22/2020     7:12 PM 1/18/2022    11:15 PM 6/6/2024     8:00 AM   CAGE-AID Total Score   Total Score 4 4 4   Total Score MyChart 4 (A total score of 2 or greater is considered clinically significant) 4 (A total score of 2 or greater is considered clinically significant)      PROMIS 10-Global Health (all questions and answers displayed):       10/30/2024     8:48 AM 11/6/2024    12:54 PM 11/14/2024    11:35 AM 11/26/2024     5:00 PM 12/4/2024     9:28 AM 12/18/2024     9:10 AM 12/30/2024     5:44 PM   PROMIS 10   In general, would you say your health is: Fair Fair Fair  Good Fair Fair   In general, would you say your quality of life is: Poor Poor " Poor  Poor Fair Poor   In general, how would you rate your physical health? Poor Good Fair  Good Fair Fair   In general, how would you rate your mental health, including your mood and your ability to think? Fair Fair Fair  Fair Fair Poor   In general, how would you rate your satisfaction with your social activities and relationships? Fair Poor Poor  Poor Poor Poor   In general, please rate how well you carry out your usual social activities and roles Poor Poor Fair  Poor Poor Poor   To what extent are you able to carry out your everyday physical activities such as walking, climbing stairs, carrying groceries, or moving a chair? Moderately Moderately Moderately  Moderately Moderately Moderately   In the past 7 days, how often have you been bothered by emotional problems such as feeling anxious, depressed, or irritable? Often Often Often  Often Often Often   In the past 7 days, how would you rate your fatigue on average? Moderate Moderate Moderate  Moderate Moderate Moderate   In the past 7 days, how would you rate your pain on average, where 0 means no pain, and 10 means worst imaginable pain? 7 7 7  8 7 8   In general, would you say your health is: 2 2 2  3 2 2   In general, would you say your quality of life is: 1 1 1  1 2 1   In general, how would you rate your physical health? 1 3 2  3 2 2   In general, how would you rate your mental health, including your mood and your ability to think? 2 2 2  2 2 1   In general, how would you rate your satisfaction with your social activities and relationships? 2 1 1  1 1 1   In general, please rate how well you carry out your usual social activities and roles. (This includes activities at home, at work and in your community, and responsibilities as a parent, child, spouse, employee, friend, etc.) 1 1 2  1 1 1   To what extent are you able to carry out your everyday physical activities such as walking, climbing stairs, carrying groceries, or moving a chair? 3 3 3  3 3 3   In  the past 7 days, how often have you been bothered by emotional problems such as feeling anxious, depressed, or irritable? 4 4 4  4 4 4   In the past 7 days, how would you rate your fatigue on average? 3 3 3  3 3 3   In the past 7 days, how would you rate your pain on average, where 0 means no pain, and 10 means worst imaginable pain? 7 7 7  8 7 8   Global Mental Health Score 7  6  6   6  7  5    Global Physical Health Score 9  11  10   11  10  10    PROMIS TOTAL - SUBSCORES 16  17  16   17  17  15        Information is confidential and restricted. Go to Review Flowsheets to unlock data.    Patient-reported     [place] Suicide Severity Rating Scale (Lifetime/Recent)      6/11/2020    10:00 AM 1/9/2023     1:00 PM 5/21/2024     4:49 PM 5/21/2024     9:00 PM 6/6/2024     8:00 AM 7/10/2024    12:00 PM 11/22/2024    11:05 AM   [place] Suicide Severity Rating (Lifetime/Recent)   Q1 Wish to be Dead (Lifetime) Yes   Yes      Comments when patient was in treatment and there were family concerns   OD on medications      Q2 Non-Specific Active Suicidal Thoughts (Lifetime) Yes   No      Most Severe Ideation Rating (Lifetime) 5   5      Most Severe Ideation Description (Lifetime) when family problems were happening   OD on medication      Frequency (Lifetime) --   3      Duration (Lifetime) --   3      Controllability (Lifetime) 0   2      Protective Factors  (Lifetime) 5   4      Reasons for Ideation (Lifetime) --   5      Q1 Wished to be Dead (Past Month)  yes 1-->yes 1-->yes 1-->yes  0-->no   Q2 Suicidal Thoughts (Past Month)  no 1-->yes 1-->yes 1-->yes  0-->no   Q3 Suicidal Thought Method  no 0-->no 0-->no 1-->yes     Q4 Suicidal Intent without Specific Plan  no 1-->yes 0-->no 0-->no     Q5 Suicide Intent with Specific Plan  no 0-->no 0-->no 1-->yes     Q6 Suicide Behavior (Lifetime)  yes 1-->yes 0-->no 1-->yes  0-->no   If yes to Q6, within past 3 months?  no 1-->yes 0-->no 0-->no     Level of Risk per Screen  moderate  risk high risk moderate risk high risk  no risks indicated   RETIRED: 1. Wish to be Dead (Recent) No         RETIRED: 2. Non-Specific Active Suicidal Thoughts (Recent) No         3. Active Suicidal Ideation with any Methods (Not Plan) Without Intent to Act (Lifetime) Yes         RETIRE: Active Suicidal Ideation with any Methods (Not Plan) Description (Lifetime) 30 years prior         RETIRE: 4. Active Suicidal Ideation with Some Intent to Act, Without Specific Plan (Lifetime) Yes         RETIRE: 5. Active Suicidal Ideation with Specific Plan and Intent (Lifetime) Yes         Actual Attempt (Lifetime) Yes         Actual Attempt Description (Lifetime) patient reported overdosing on Prozac about thirty years ago         Total Number of Actual Attempts (Lifetime) 1         Actual Attempt (Past 3 Months) No         Has subject engaged in non-suicidal self-injurious behavior? (Lifetime) Yes         Has subject engaged in non-suicidal self-injurious behavior? (Past 3 Months) No         Interrupted Attempts (Lifetime) No         Interrupted Attempts (Past 3 Months) No         Aborted or Self-Interrupted Attempt (Lifetime) No         Aborted or Self-Interrupted Attempt (Past 3 Months) No         Preparatory Acts or Behavior (Lifetime) No         Preparatory Acts or Behavior (Past 3 Months) No         Most Recent Attempt Date --         Comments 30 years prior         Most Recent Attempt Actual Lethality Code 0         1. Wish to be Dead (Lifetime)      N    2. Non-Specific Active Suicidal Thoughts (Lifetime)      N          ASSESSMENT: Current Emotional / Mental Status (status of significant symptoms):   Risk status (Self / Other harm or suicidal ideation)   Patient denies current fears or concerns for personal safety.   Patient denies current or recent suicidal ideation or behaviors.   Patient denies current or recent homicidal ideation or behaviors.   Patient denies current or recent self injurious behavior or  ideation.   Patient denies other safety concerns.   Patient reports there has been no change in risk factors since their last session.     Patient reports there has been no change in protective factors since their last session.     A safety and risk management plan has been developed including: Patient consented to co-developed safety plan on 6/4/24.  Safety and risk management plan was reviewed.   Patient agreed to use safety plan should any safety concerns arise.  A copy was made available to the patient.     Appearance:   Appropriate    Eye Contact:   Good    Psychomotor Behavior: Normal    Attitude:   Friendly Pleasant   Orientation:   All   Speech    Rate / Production: Slow     Volume:  Normal    Mood:    Anxious  Sad    Affect:    Subdued    Thought Content:  Clear    Thought Form:  Goal Directed    Insight:    Good      Medication Review:   No changes to current psychiatric medication(s)     Medication Compliance:   Yes     Changes in Health Issues:   None reported     Chemical Use Review:   Substance Use: Chemical use reviewed, no active concerns identified      Tobacco Use: No current tobacco use.      Diagnosis:  1. 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 Generalized anxiety disorder        Collateral Reports Completed:   Not Applicable    PLAN: (Patient Tasks / Therapist Tasks / Other)  Continue following safety plan.  Focus on small tasks. Piggy bank metaphor for self-care.      Erika Colorado, EWELINA                                                         ______________________________________________________________________    Individual Treatment Plan    Patient's Name: Randi Cleary  YOB: 1955    Date of Creation: 7/17/2024  Date Treatment Plan Last Reviewed/Revised: 7/17/2024, 10/30/24, 2/3/2025    DSM5 Diagnoses:   296.33 (F33.2) Major Depressive Disorder, Recurrent Episode, Severe   300.02 (F41.1) Generalized Anxiety  Disorder  Psychosocial / Contextual Factors: history of trauma  PROMIS (reviewed every 90 days):     Referral / Collaboration:  Discussed and patient will pursue a therapy group for depressive symptoms.    Anticipated number of session for this episode of care: 9-12 sessions  Anticipation frequency of session: Weekly  Anticipated Duration of each session: 38-52 minutes  Treatment plan will be reviewed in 90 days or when goals have been changed.       MeasurableTreatment Goal(s) related to diagnosis / functional impairment(s)  Goal 1: Client will keep self safe and eliminate suicidal ideation and self-harm behaviors.    Objective #A (Client Action)    Client will make a list of at least 10 skills or activities that you will to use to distract from urges to harm self.  Status: Completed - Date: 10/30/24       Intervention(s)  Therapist will provide ideas and strategies for generating coping skills list.    Objective #B  Client will make a list of pros and cons for tolerating and not tolerating an urge to harm self.  Status: Continued - Date(s): 2/3/2025    Intervention(s)  Therapist will guide client through DBT-style Pros/Cons list for behavior change.    Objective #C  Client will identify and practice the new strategies for dealing with strong emotions, learn and practice relaxation breathing.  Status: Continued - Date(s): 2/24/25      Intervention(s)  Therapist will teach distraction skills. Practice them within session and outside of session.    Goal 2: Client will report reduction in anxiety also as evidenced by reduction of CHAVO-7 score below 6 points within the next 12 weeks.    Objective #A (Client Action)    Client will identify at least three triggers for anxiety.  Status: Completed - Date: 10/30/24       Intervention(s)  Therapist will provide educational materials on common triggers and signs of anxiety.    Objective #B  Client will use relaxation strategies at least two times per day to reduce the physical  symptoms of anxiety.  Status: Continued - Date(s): 2/3/2025    Intervention(s)  Therapist will teach relaxation strategies such as mindfulness, deep breathing, muscle relaxation, and sensory activities.    Objective #C  Client will use cognitive strategies identified in therapy to challenge anxious thoughts.  Status: Continued - Date(s): 2/3/2025    Intervention(s)  Therapist will teach strategies for cognitive modification using REBT model.    Goal 3: Client will report improved mood as indicated by PHQ-9 score below 6 for consistent 8 weeks.    Objective #A (Client Action)    Status: Continued - Date: 2/3/2025    Client will Increase interest, engagement, and pleasure in doing things.    Intervention(s)  Therapist will assign homework for daily involvement in pleasant activities.    Objective #B  Client will Identify negative self-talk and behaviors: challenge core beliefs, myths, and actions.    Status: Continued - Date(s): 2/3/2025    Intervention(s)  Therapist will teach strategies for cognitive modification using REBT models.    Objective #C  Client will Improve quantity and quality of night time sleep / decrease daytime naps.  Status: Continued - Date(s): 2/3/2025    Intervention(s)  Therapist will teach sleep hygiene strategies.  Assign homework for daily practice.    Goal 4: Client will report reduced or eliminated physiological activation when recalling a distressing event including decreased re-experiencing, decreased avoidance, and decreased hyperarousal.    Objective #A (Client Action)    Status:  Continued: 2/3/25       Client will acquire knowledge of trauma, common symptoms post-trauma, emotion regulation strategies, stress management strategies, and cognitive coping strategies.    Intervention(s)  Therapist will teach about effects of trauma on mind and body; encourage emotion identification and expression; practice with client the stress management strategies; and teach cognitive  modification.    Objective #B  Client will use Brainspotting while focusing on physiological sensations related to distressing events.  Client will assess and rate level of physiological activation.  Status: Continued - Date(s): 2/3/2025    Intervention(s)  Therapist will provide use a pointer or other materials to assist client in holding a brainspot in their visual field.  Therapist might also provide biolateral music through headphones to deepen the experience.         Patient has reviewed and agreed to the above plan.      Erika Colorado, LMFT  July 17, 2024

## 2025-02-25 ENCOUNTER — APPOINTMENT (OUTPATIENT)
Dept: LAB | Facility: CLINIC | Age: 70
End: 2025-02-25
Attending: INTERNAL MEDICINE
Payer: MEDICARE

## 2025-02-25 ENCOUNTER — ONCOLOGY VISIT (OUTPATIENT)
Dept: ONCOLOGY | Facility: CLINIC | Age: 70
End: 2025-02-25
Attending: INTERNAL MEDICINE
Payer: MEDICARE

## 2025-02-25 VITALS
HEART RATE: 79 BPM | OXYGEN SATURATION: 95 % | BODY MASS INDEX: 28.72 KG/M2 | DIASTOLIC BLOOD PRESSURE: 71 MMHG | SYSTOLIC BLOOD PRESSURE: 115 MMHG | TEMPERATURE: 98.2 F | RESPIRATION RATE: 16 BRPM | WEIGHT: 172.6 LBS

## 2025-02-25 DIAGNOSIS — E83.110 HEREDITARY HEMOCHROMATOSIS: ICD-10-CM

## 2025-02-25 LAB
ALBUMIN SERPL BCG-MCNC: 4.3 G/DL (ref 3.5–5.2)
ALP SERPL-CCNC: 64 U/L (ref 40–150)
ALT SERPL W P-5'-P-CCNC: 17 U/L (ref 0–50)
ANION GAP SERPL CALCULATED.3IONS-SCNC: 11 MMOL/L (ref 7–15)
AST SERPL W P-5'-P-CCNC: 23 U/L (ref 0–45)
BASOPHILS # BLD AUTO: 0 10E3/UL (ref 0–0.2)
BASOPHILS NFR BLD AUTO: 1 %
BILIRUB SERPL-MCNC: 0.4 MG/DL
BUN SERPL-MCNC: 15.4 MG/DL (ref 8–23)
CALCIUM SERPL-MCNC: 9.4 MG/DL (ref 8.8–10.4)
CHLORIDE SERPL-SCNC: 104 MMOL/L (ref 98–107)
CREAT SERPL-MCNC: 0.58 MG/DL (ref 0.51–0.95)
EGFRCR SERPLBLD CKD-EPI 2021: >90 ML/MIN/1.73M2
EOSINOPHIL # BLD AUTO: 0.1 10E3/UL (ref 0–0.7)
EOSINOPHIL NFR BLD AUTO: 2 %
ERYTHROCYTE [DISTWIDTH] IN BLOOD BY AUTOMATED COUNT: 12.4 % (ref 10–15)
FERRITIN SERPL-MCNC: 109 NG/ML (ref 11–328)
GLUCOSE SERPL-MCNC: 76 MG/DL (ref 70–99)
HCO3 SERPL-SCNC: 27 MMOL/L (ref 22–29)
HCT VFR BLD AUTO: 48.7 % (ref 35–47)
HGB BLD-MCNC: 16.8 G/DL (ref 11.7–15.7)
IMM GRANULOCYTES # BLD: 0 10E3/UL
IMM GRANULOCYTES NFR BLD: 0 %
IRON BINDING CAPACITY (ROCHE): 273 UG/DL (ref 240–430)
IRON SATN MFR SERPL: 86 % (ref 15–46)
IRON SERPL-MCNC: 236 UG/DL (ref 37–145)
LDH SERPL L TO P-CCNC: 194 U/L (ref 0–250)
LYMPHOCYTES # BLD AUTO: 1.6 10E3/UL (ref 0.8–5.3)
LYMPHOCYTES NFR BLD AUTO: 28 %
MCH RBC QN AUTO: 33 PG (ref 26.5–33)
MCHC RBC AUTO-ENTMCNC: 34.5 G/DL (ref 31.5–36.5)
MCV RBC AUTO: 96 FL (ref 78–100)
MONOCYTES # BLD AUTO: 0.5 10E3/UL (ref 0–1.3)
MONOCYTES NFR BLD AUTO: 9 %
NEUTROPHILS # BLD AUTO: 3.5 10E3/UL (ref 1.6–8.3)
NEUTROPHILS NFR BLD AUTO: 61 %
NRBC # BLD AUTO: 0 10E3/UL
NRBC BLD AUTO-RTO: 0 /100
PLATELET # BLD AUTO: 184 10E3/UL (ref 150–450)
POTASSIUM SERPL-SCNC: 4.5 MMOL/L (ref 3.4–5.3)
PROT SERPL-MCNC: 7.2 G/DL (ref 6.4–8.3)
RBC # BLD AUTO: 5.09 10E6/UL (ref 3.8–5.2)
RETICS # AUTO: 0.1 10E6/UL (ref 0.03–0.1)
RETICS/RBC NFR AUTO: 1.9 % (ref 0.5–2)
SODIUM SERPL-SCNC: 142 MMOL/L (ref 135–145)
WBC # BLD AUTO: 5.8 10E3/UL (ref 4–11)

## 2025-02-25 PROCEDURE — 85025 COMPLETE CBC W/AUTO DIFF WBC: CPT | Performed by: INTERNAL MEDICINE

## 2025-02-25 PROCEDURE — 83550 IRON BINDING TEST: CPT | Performed by: INTERNAL MEDICINE

## 2025-02-25 PROCEDURE — 36415 COLL VENOUS BLD VENIPUNCTURE: CPT | Performed by: INTERNAL MEDICINE

## 2025-02-25 PROCEDURE — 82310 ASSAY OF CALCIUM: CPT | Performed by: INTERNAL MEDICINE

## 2025-02-25 PROCEDURE — 85045 AUTOMATED RETICULOCYTE COUNT: CPT | Performed by: INTERNAL MEDICINE

## 2025-02-25 PROCEDURE — 82728 ASSAY OF FERRITIN: CPT | Performed by: INTERNAL MEDICINE

## 2025-02-25 PROCEDURE — 83615 LACTATE (LD) (LDH) ENZYME: CPT | Performed by: INTERNAL MEDICINE

## 2025-02-25 PROCEDURE — 82040 ASSAY OF SERUM ALBUMIN: CPT | Performed by: INTERNAL MEDICINE

## 2025-02-25 PROCEDURE — 84155 ASSAY OF PROTEIN SERUM: CPT | Performed by: INTERNAL MEDICINE

## 2025-02-25 PROCEDURE — 83540 ASSAY OF IRON: CPT | Performed by: INTERNAL MEDICINE

## 2025-02-25 ASSESSMENT — PAIN SCALES - GENERAL: PAINLEVEL_OUTOF10: NO PAIN (0)

## 2025-02-25 NOTE — PROGRESS NOTES
[facility] Hematology Progress Note    Outpatient Visit Note:    Patient: Randi Damon  MRN: [id number]  : 1955  YUAN: 2025    Reason for Consultation:  Two copies of the C282Y mutation - hereditary hemochromatosis    Assessment:  In summary, Randi Damon is a 69 year old woman with hereditary hemochromatosis and Past Medical History of KYAW, major depressive disorder, serotonin syndrome, pheochromocytoma,     1. Hereditary hemochromatosis  2. Restless leg syndrome  3. Major depressive disorder  4. Obstructive sleep apnea  5. Polycythemia, likely secondary to to #4  6. Finger pain likely 2/2 gout versus arthritis     For hereditary hemochromatosis we typically try to remove excess iron by doing serial phlebotomy to reduce iron (ferritin) to goal of 50. However, in Ms. Damon's case, she has severe restless leg and fatigue therefore would permit for target ferritin of 125. Would NOT allow for ferritin to exceed 250 in this patient. Threshold for phlebotomy will be 125.     Ms. Damon has polycythemia. This is NOT related to HH, as iron status is regulated separate from this. This is likely secondary to her untreated KYAW, however if she were to develop leukocytosis or thrombocytosis would consider sending evaluation for MPN.  Her platelets and her leukocytes are normal.     Discussed with Ms. Damon the pathophysiology of iron overload disorders. Given ferritin levels remain <1000 and prior testing has not demonstrated end-organ damage do not suspect her joint issues are related.     Pt has ongoing pain with her b/l knuckles. Suspect that this pain is most likely related to gout versus arthritis. Less likely but would consider hemochromatosis related changes. Will order xrays of hand.     Plan:  1. Repeat labs in 3 months   2. Labs and visit with Dr. Pablo in 6 months   3. Phlebotomy for ferritin >125    Pt was seen in conjunction with Dr. Bev Truong MD    Hematology/Oncology Fellow PGY6  Pager: [phone]    ----------------------------------------------------------------------------------------------------------------------    Interval History:  Radni Damon is a 69 year old woman who is homozygous for Hereditary hemochromatosis C282Y mutation with complex medical history who presents for follow-up.    She notes that her mood continues to be down. She reports having a fall in January when she was leaning off the side of the bed. She thinks one of her psychiatric medications may have contributed to this. Unfortunately, it ended up delaying her surgery for her right shoulder, which she has been disappointed about. Weight has been decreasing. She also struggles with insomnia, but notes that melatonin and lavender oil has been very helpful. ECT, her psychiatric, and therapist have all been helpful for her mood as well. She has been trying to get back to swimming recently but needs to get certified for the place she wants to swim. Her restless legs are better controlled since being on ropinirole and gabapentin.      Past Medical History:  Past Medical History:   Diagnosis Date    Bipolar 2 disorder (H)     Breast cancer (H) 1986    lumpectomy, radiation, chemo    Chronic pain syndrome     COPD (chronic obstructive pulmonary disease) (H)     asthma    Cord compression (H) 12/21/2021    Dizzy     Drug tolerance     opioid    Esophageal reflux     Fatigue     Generalized anxiety disorder     Graves disease 1994    Hemochromatosis 02/14/2018    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; H63D not detected    History of corticosteroid therapy 11/19/2019    History of partial adrenalectomy 11/19/2019    Hx antineoplastic chemotherapy     Hx of radiation therapy     Hyperlipidaemia     Hypertension     Impaired fasting glucose 2017    Infection due to 2019 novel coronavirus 06/04/2021    Injury of neck, whiplash 07/15/2021    Joint pain     KYAW (obstructive sleep apnea) 2016    Osteopenia      Paroxysmal atrial fibrillation (H) 11/2024    Pheochromocytoma, left 08/02/2017    laparoscopically removed    Postablative hypothyroidism 1995    Prediabetes 10/03/2019    by A1c    Psoriasis     Psoriatic arthropathy (H)     Pulmonary hypertension (H)     Right rotator cuff tear     RLS (restless legs syndrome)     on ropinorole    Sacroiliitis     Serotonin syndrome 08/28/2020    [facility] - While on desvenlafaxine 100mg    Snoring     Spinal stenosis     Status post coronary angiogram 10/03/2019    Urinary incontinence     Vitamin B 12 deficiency 2009    Vitamin D deficiency 2010     Past Surgical History:  Past Surgical History:   Procedure Laterality Date    ARTHRODESIS ANKLE      ARTHROPLASTY ANKLE Right 6/29/2015    Procedure: ARTHROPLASTY ANKLE;  Surgeon: Jason Coughlin MD;  Location: [facility]    ARTHROPLASTY REVISION ANKLE Right 6/29/2015    Procedure: ARTHROPLASTY REVISION ANKLE;  Surgeon: Jason Coughlin MD;  Location: [facility]    BIOPSY BREAST      BREAST BIOPSY, CORE RT/LT      COLONOSCOPY      COLONOSCOPY N/A 2/25/2021    Procedure: COLONOSCOPY;  Surgeon: Guru Elke Tolbert MD;  Location:  GI    CV CORONARY ANGIOGRAM N/A 10/3/2019    Procedure: CV CORONARY ANGIOGRAM;  Surgeon: Bryce Pierre MD;  Location:  HEART CARDIAC CATH LAB    CV RIGHT HEART CATH MEASUREMENTS RECORDED N/A 10/3/2019    Procedure: CV RIGHT HEART CATH;  Surgeon: Bryce Pierre MD;  Location:  HEART CARDIAC CATH LAB    CV RIGHT HEART CATH MEASUREMENTS RECORDED N/A 9/25/2024    Procedure: Heart Cath Right Heart Cath;  Surgeon: George Becker MD;  Location:  HEART CARDIAC CATH LAB    ESOPHAGOSCOPY, GASTROSCOPY, DUODENOSCOPY (EGD), COMBINED N/A 2/25/2021    Procedure: ESOPHAGOGASTRODUODENOSCOPY, WITH BIOPSY;  Surgeon: Guru Elke Tolbert MD;  Location:  GI    EYE SURGERY  2021    HC REMOVE TONSILS/ADENOIDS,<11 Y/O      Description: Tonsillectomy With  Adenoidectomy;  Recorded: 04/07/2010;    IR LUMBAR EPIDURAL STEROID INJECTION  10/26/2004    IR LUMBAR EPIDURAL STEROID INJECTION  11/16/2004    IR LUMBAR EPIDURAL STEROID INJECTION  12/21/2004    IR LUMBAR EPIDURAL STEROID INJECTION  6/8/2006    JOINT REPLACEMENT      LAMINOPLASTY CERVICAL POSTERIOR THREE+ LEVELS Left 12/21/2021    Procedure: CERVICAL 3-CERVICAL 6 LEFT OPEN DOOR LAMINOPLASTY AND LEFT CERVICAL 4-5 AND CERVICAL 6-7 POSTERIOR FORAMINOTOMY;  Surgeon: Angela Gregory MD;  Location: [facility] Main OR    LAPAROSCOPIC ADRENALECTOMY Left 08/02/2017    pheochromocytoma    LAPAROSCOPIC ADRENALECTOMY Left 8/2/2017    Procedure: LAPAROSCOPIC LEFT ADRENALECTOMY, ;  Surgeon: Gab Linares MD;  Location: [facility] Main OR;  Service:     LENGTHEN TENDON ACHILLES Right 6/29/2015    Procedure: LENGTHEN TENDON ACHILLES;  Surgeon: Jason Coughlin MD;  Location: [facility]    LUMPECTOMY BREAST      LUMPECTOMY BREAST Left 1994    MAMMOPLASTY REDUCTION Right 01/13/2015    [place]    MAMMOPLASTY REDUCTION Right     approx late 2015/early2016    MASTECTOMY      left lumpectomy with axillary node dissection    MASTECTOMY MODIFIED RADICAL      OTHER SURGICAL HISTORY Right     reconstructive breast surgery    OTHER SURGICAL HISTORY      Adrenalectomy for pheochromocytoma    [place] MASTECTOMY, MODIFIED RADICAL      Description: Modified Radical Mastectomy Left Breast;  Recorded: 04/07/2010;    REPAIR HAMMER TOE Right 6/29/2015    Procedure: REPAIR HAMMER TOE;  Surgeon: Jason Coughlin MD;  Location: [facility]    TONSILLECTOMY      TONSILLECTOMY & ADENOIDECTOMY      ZZC ARTHRODESIS,ANKLE,OPEN Right     Description: Ankle Arthrodesis;  Recorded: 04/07/2010;       Medications:  Current Outpatient Medications   Medication Sig Dispense Refill    acetaminophen (TYLENOL) 325 MG tablet Take 325-650 mg by mouth every 6 hours as needed for mild pain.      albuterol (VENTOLIN HFA) 108 (90 Base) MCG/ACT inhaler Inhale 2 puffs into the  lungs every 6 hours as needed for shortness of breath, wheezing or cough 18 g 11    alendronate (FOSAMAX) 70 MG tablet Take 1 tablet (70 mg) by mouth every 7 days. Take 60 minutes before am meal with 8 oz. water. Remain upright for 30 minutes. 12 tablet 3    benzonatate (TESSALON) 100 MG capsule Take 1 capsule (100 mg) by mouth 3 times daily as needed for cough. 90 capsule 1    Cyanocobalamin (VITAMIN B-12) 5000 MCG SUBL Place 2-3 sprays under the tongue every morning. Unknown dose. 2 or 3 sprays/day      doxycycline hyclate (VIBRA-TABS) 100 MG tablet Take 1 tablet (100 mg) by mouth 2 times daily for 7 days. 14 tablet 0    ethacrynic acid (EDECRIN) 25 MG tablet Take 1 tablet (25 mg) by mouth daily. 30 tablet 0    Fluticasone-Umeclidin-Vilant (TRELEGY ELLIPTA) 100-62.5-25 MCG/ACT oral inhaler Inhale 1 puff into the lungs daily. 60 each 3    gabapentin (NEURONTIN) 100 MG capsule Take 1-2 capsules (100-200 mg) by mouth at bedtime. May also take 1-2 capsules (100-200 mg) daily as needed for other (anxiety). Do not use the night before ECT.. 120 capsule 2    guaiFENesin (MUCINEX) 600 MG 12 hr tablet Take 600 mg by mouth every morning.      ketoconazole (NIZORAL) 2 % external shampoo Apply topically daily as needed.      KLOR-CON M20 20 MEQ CR tablet Take 1 tablet (20 mEq) by mouth every morning. 90 tablet 3    Lavender Oil 80 MG CAPS Take 160 mg by mouth at bedtime.      MAGNESIUM PO Take 2 capsules by mouth 2 times daily. Strength unknown      medical cannabis (Patient's own supply) See Admin Instructions (The purpose of this order is to document that the patient reports taking medical cannabis.  This is not a prescription, and is not used to certify that the patient has a qualifying medical condition.)  Flower      melatonin 1 MG CAPS Take 1 mg by mouth at bedtime.      metroNIDAZOLE (FLAGYL) 500 MG tablet Take 1 tablet (500 mg) by mouth 3 times daily for 7 days. 21 tablet 0    naloxone (NARCAN) 4 MG/0.1ML nasal spray  "Spray 1 spray (4 mg) into one nostril alternating nostrils as needed for opioid reversal every 2-3 minutes until assistance arrives 0.2 mL 0    omeprazole (PRILOSEC) 20 MG DR capsule Take 1 capsule (20 mg) by mouth as needed. 90 capsule 3    oxyCODONE (ROXICODONE) 5 MG tablet Take 1 tablet (5 mg) by mouth 5 times daily. May dispense 02/19/25 for start 02/20/25 70 tablet 0    rOPINIRole (REQUIP) 2 MG tablet Take 1 tablet (2 mg) by mouth 3 times daily. 270 tablet 3    STATIN NOT PRESCRIBED (INTENTIONAL) Please choose reason not prescribed from choices below.      SYNTHROID 150 MCG tablet Take 1 tablet (150 mcg) by mouth every morning. MON to SAT 1 tablet/day; SUN 0.5 tablet - 90 tablet 4    UNABLE TO FIND Take 1 tablet by mouth every morning. MEDICATION NAME: CETYL-MYRISFOLEATE      vitamin C (ASCORBIC ACID) 1000 MG TABS Take 1,000 mg by mouth every morning.      vitamin D3 (CHOLECALCIFEROL) 250 mcg (79244 units) capsule Take 1 capsule by mouth once a week. SUNDAY'S          Allergies:  Allergies   Allergen Reactions    Serotonin Reuptake Inhibitors (Ssris) Anxiety, Difficulty breathing, Headache, Palpitations and Shortness Of Breath    Buspirone      The patient states she had serotonin syndrome    Cephalexin      Other reaction(s): unknown rxn.    Desvenlafaxine      Serotonin syndrome    Diclofenac Sodium [Diclofenac]      Serotonin syndrome and restless legs syndrome    Gabapentin      Drove on the wrong side of the highway    Levofloxacin      \"CAN'T REMEMBER\"    Penicillins      \"SORES IN MOUTH\"    Riluzole Difficulty breathing and Swelling    Sulfa Antibiotics      \"PT DOES NOT KNOW WHAT THE REACTION WAS\"    Topiramate Other (See Comments)     Frequent urination    Dextromethorphan Anxiety       ROS:  A 10 point ROS is negative except as stated in the HPI    Social History:  Denies any tobacco use. Denies any illicit drug use.     Family History:  Reviewed- no interval updates    Vitals: /71   Pulse 79  "  Temp 98.2  F (36.8  C) (Oral)   Resp 16   Wt 78.3 kg (172 lb 9.6 oz)   LMP  (LMP Unknown)   SpO2 95%   BMI 28.72 kg/m     GENERAL: Sitting comfortably, alert, NAD  EYES: No obvious scleral/conjunctival abnormalities.  RESP: CTAB, No wheeze  CARD: RRR     SKIN: Visible skin clear. No significant rash, abnormal pigmentation or lesions.  NEURO: Cranial nerves grossly intact.  Mentation and speech appropriate for age.  PSYCH: Mood is down, affect is congruent, good eye contact, no speech latency, denies SI/HI    Labs: personally reviewed with relevant trends annotated below  Ferritin   Date Value Ref Range Status   02/25/2025 109 11 - 328 ng/mL Final   11/22/2024 139 11 - 328 ng/mL Final   08/22/2024 95 11 - 328 ng/mL Final   03/29/2024 71 11 - 328 ng/mL Final   01/22/2024 70 11 - 328 ng/mL Final   09/25/2023 61 11 - 328 ng/mL Final   02/05/2021 48 8 - 252 ng/mL Final   10/30/2020 20 8 - 252 ng/mL Final   02/12/2020 19 8 - 252 ng/mL Final   09/24/2019 33 8 - 252 ng/mL Final   09/19/2019 36 8 - 252 ng/mL Final   09/13/2019 42 8 - 252 ng/mL Final     Iron   Date Value Ref Range Status   02/25/2025 236 (H) 37 - 145 ug/dL Final   08/22/2024 204 (H) 37 - 145 ug/dL Final   01/22/2024 171 (H) 37 - 145 ug/dL Final   07/25/2023 229 (H) 37 - 145 ug/dL Final   05/01/2023 85 37 - 145 ug/dL Final   01/18/2023 185 (H) 37 - 145 ug/dL Final   11/04/2022 201 (H) 37 - 145 ug/dL Final   07/01/2022 127 35 - 180 ug/dL Final   02/05/2021 141 35 - 180 ug/dL Final   10/30/2020 52 35 - 180 ug/dL Final   09/24/2019 92 35 - 180 ug/dL Final   08/13/2019 237 (H) 35 - 180 ug/dL Final     WBC   Date Value Ref Range Status   02/05/2021 6.9 4.0 - 11.0 10e9/L Final     WBC Count   Date Value Ref Range Status   02/25/2025 5.8 4.0 - 11.0 10e3/uL Final     Hemoglobin   Date Value Ref Range Status   02/25/2025 16.8 (H) 11.7 - 15.7 g/dL Final   12/19/2024 17.8 (H) 11.7 - 15.7 g/dL Final   11/23/2024 14.9 11.7 - 15.7 g/dL Final   11/22/2024 16.9 (H)  11.7 - 15.7 g/dL Final   09/25/2024 17.2 (H) 11.7 - 15.7 g/dL Final   08/22/2024 16.9 (H) 11.7 - 15.7 g/dL Final   02/05/2021 17.7 (H) 11.7 - 15.7 g/dL Final   10/30/2020 15.4 11.7 - 15.7 g/dL Final   03/09/2020 14.3 11.7 - 15.7 g/dL Final   02/12/2020 14.0 11.7 - 15.7 g/dL Final   10/03/2019 14.6 11.7 - 15.7 g/dL Final   09/24/2019 15.7 11.7 - 15.7 g/dL Final     Hemoglobin POCT   Date Value Ref Range Status   09/25/2024 15.6 11.7 - 15.7 g/dL Final     Comment:     PULMONARY ARTERY       Imaging:  NA

## 2025-02-25 NOTE — PATIENT INSTRUCTIONS
We will check hand x-rays for hemochromatosis related hand issues    Dr. Pablo will send results through [employer].       Labs only in 3 months      Follow up with Dr. Pablo in 6 months with labs prior.       Tova Pablo MD/PhD  [facility]  Division of Hematology, Oncology and Transplantation    [facility]  [address] [place] [address], Mail Code [id number]  [place] [address]    Clinic Scheduling and Nurse Line: [phone], option 5, option 2    [facility] RN Care Coordinator:   Crissy Musa  Direct line:   (P) [phone]  (F) [phone]

## 2025-02-25 NOTE — NURSING NOTE
Chief Complaint   Patient presents with    Blood Draw     Vpt blood draw by lab rN     Venipuncture labs drawn by lab RN, vitals taken and appointment arrived.    Aparna Rainey RN

## 2025-02-25 NOTE — Clinical Note
2/25/2025      Randi Cleary  [address]  [place] [address]      Dear Colleague,    Thank you for referring your patient, Randi Cleary, to the [facility]. Please see a copy of my visit note below.    No notes on file    Again, thank you for allowing me to participate in the care of your patient.        Sincerely,        Tova Pablo MD    Electronically signed

## 2025-02-25 NOTE — NURSING NOTE
"Oncology Rooming Note    February 25, 2025 3:08 PM   Randi Cleary is a 69 year old female who presents for:    Chief Complaint   Patient presents with    Blood Draw     Vpt blood draw by lab rN    Oncology Clinic Visit     Hereditary hemochromatosis     Initial Vitals: /71   Pulse 79   Temp 98.2  F (36.8  C) (Oral)   Resp 16   Wt 78.3 kg (172 lb 9.6 oz)   LMP  (LMP Unknown)   SpO2 95%   BMI 28.72 kg/m   Estimated body mass index is 28.72 kg/m  as calculated from the following:    Height as of 2/12/25: 1.651 m (5' 5\").    Weight as of this encounter: 78.3 kg (172 lb 9.6 oz). Body surface area is 1.89 meters squared.  No Pain (0) Comment: Data Unavailable   No LMP recorded (lmp unknown). Patient is postmenopausal.  Allergies reviewed: Yes  Medications reviewed: Yes    Medications: Medication refills not needed today.  Pharmacy name entered into [employer]:    [id number] [place], [place] - [address] [place] [address] - [address] [facility]     Frailty Screening:   Is the patient here for a new oncology consult visit in cancer care? 2. No      Clinical concerns: Pt reports feeling very fatigued today. Pt also reports that she met with endocrinologist last week and is wondering if results can be re-explained as she did not understand. Dr. Pablo was notified via message.       Berenice Arrieta, EMT     "

## 2025-02-26 ENCOUNTER — HOSPITAL ENCOUNTER (EMERGENCY)
Facility: CLINIC | Age: 70
Discharge: HOME | End: 2025-02-26
Attending: EMERGENCY MEDICINE | Admitting: EMERGENCY MEDICINE
Payer: MEDICARE

## 2025-02-26 ENCOUNTER — TELEPHONE (OUTPATIENT)
Dept: INTERNAL MEDICINE | Facility: CLINIC | Age: 70
End: 2025-02-26
Payer: MEDICARE

## 2025-02-26 VITALS
DIASTOLIC BLOOD PRESSURE: 64 MMHG | HEART RATE: 103 BPM | SYSTOLIC BLOOD PRESSURE: 128 MMHG | BODY MASS INDEX: 28.62 KG/M2 | OXYGEN SATURATION: 93 % | RESPIRATION RATE: 16 BRPM | WEIGHT: 172 LBS | TEMPERATURE: 98.1 F
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
## 2025-02-26 PROCEDURE — 99282 EMERGENCY DEPT VISIT SF MDM: CPT

## 2025-02-26 ASSESSMENT — COLUMBIA-SUICIDE SEVERITY RATING SCALE - C-SSRS
2. HAVE YOU ACTUALLY HAD ANY THOUGHTS OF KILLING YOURSELF IN THE PAST MONTH?: NO
6. HAVE YOU EVER DONE ANYTHING, STARTED TO DO ANYTHING, OR PREPARED TO DO ANYTHING TO END YOUR LIFE?: NO
1. IN THE PAST MONTH, HAVE YOU WISHED YOU WERE DEAD OR WISHED YOU COULD GO TO SLEEP AND NOT WAKE UP?: NO

## 2025-02-26 NOTE — ED TRIAGE NOTES
"PT is coming in tonight with a cat bite that happened on Thursday. PT saw PCP on 2/24 and ABX of doxycycline and metronidazole. PT has been taking her ABX but today she noticed that the bite to there Rt wrist is increasing in pain and redness.    PT states that she had a cat bit on her middle finger of the same hand that did not \"heal right\".     HX: hemochromatosis,      Triage Assessment (Adult)       Row Name 02/26/25 [id number]          Triage Assessment    Airway WDL WDL        Respiratory WDL    Respiratory WDL WDL        Skin Circulation/Temperature WDL    Skin Circulation/Temperature WDL WDL        Cardiac WDL    Cardiac WDL WDL        Peripheral/Neurovascular WDL    Peripheral Neurovascular WDL WDL        Cognitive/Neuro/Behavioral WDL    Cognitive/Neuro/Behavioral WDL WDL                     "

## 2025-02-26 NOTE — TELEPHONE ENCOUNTER
S-(situation): Patient calling to inform she had a reaction to the antibiotics prescribed for cat bite and is wondering about next steps.     B-(background): Patient had virtual visit on 2/24, cat bite discussed. Prescribed doxycycline and metronidazole.   Patient has allergies to penicillin and sulfa drugs     A-(assessment): Patient reports she had a reaction to the antibiotics after her second dose yesterday morning. This occurred a couple hours after taking the antibiotics. Reports she was driving when she had onset of headache, nausea, dizziness and ringing in ears. She had to pull over and have her  pick her up.     She has not taken any additional doses since the reaction and is now feeling better. No ongoing symptoms of reaction.     Reports the cat bite looks about the same as during visit on 2/24, but notes there may be some increased redness and warmth - states it is difficult to tell.     R-(recommendations): Advised if redness or swelling worsen or with onset of fever, drainage, or if overall looking infected, to go to ED for consideration of IV antibiotics. Patient states wound does not look increasingly infected at this time but it also doesn't look any better. Should would like opinion on next steps from PCP - is another oral antibiotic advised. Advised will consult with PCP and return call with response.     Radha Pham RN

## 2025-02-26 NOTE — ED PROVIDER NOTES
EMERGENCY DEPARTMENT ENCOUNTER      NAME: Randi Cleary  AGE: 69 year old female  YOB: 1955  MRN: [id number]  EVALUATION DATE & TIME: No admission date for patient encounter.    PCP: Kaushik Hobbs    ED PROVIDER: Alejandra Gray MD    Chief Complaint   Patient presents with    Cat Bite         FINAL IMPRESSION:  1. Cat bite, initial encounter          ED COURSE & MEDICAL DECISION MAKING:    Pertinent Labs & Imaging studies reviewed. (See chart for details)  69 year old female with history of hemochromatosis, COPD, anxiety who presents to the Emergency Department for evaluation of redness on her right forearm after cat bite.  Took less than 24 hours of doxycycline and Flagyl and stopped them because of an episode where she felt headachy, dizzy and ear ringing.  Since stopping antibiotic she notes slight increase in the erythema.  See photo on exam, the erythema is frankly quite mild and there is certainly no streaking erythema.  By no means as she failed any outpatient antibiotics.  Patient presents requesting IV antibiotics specifically, but I do not think that this is clinically indicated.  Unfortunately she does have many drug allergies including penicillins, cephalosporins, Levaquin, sulfa.  Our options for oral antibiotics are limited and I do frankly like the combination of doxycycline and Flagyl.  My concern that the above reaction is a drug allergy is low.  Patient was given options including to trial and adjust her home antibiotic regimen, challenge some of her previous drug allergies but she cannot remember what reaction she had, versus simply trial going back on the doxycycline and Flagyl which would be my simple recommendation.  Patient is agreeable and will trial going back on the doxycycline and Flagyl.  Discharged home.      ED Course as of 02/26/25 2128 Wed Feb 26, 2025   1837 I met with the patient, obtained history, performed an initial exam, and discussed options and  plan for diagnostics and treatment here in the ED.       Medical Decision Making  Obtained supplemental history:Supplemental history obtained?: Family Member/Significant Other  Reviewed external records: External records reviewed?:  Telephone encounter today  Care impacted by chronic illness:Alcohol and/or Drug Abuse or Dependence, Cancer/Chemotherapy, Chronic Lung Disease, Chronic Pain, Heart Disease, Hyperlipidemia, Hypertension, and Mental Health  Did you consider but not order tests?: Work up considered but not performed and documented in chart, if applicable  Did you interpret images independently?: Independent interpretation of ECG and images noted in documentation, when applicable.  Consultation discussion with other provider:Did you involve another provider (consultant, , pharmacy, etc.)?: No  Discharge. I recommended the patient continue their current prescription strength medication(s): Doxycycline and Flagyl. See documentation for any additional details.    MIPS: Not Applicable      At the conclusion of the encounter I discussed the results of all of the tests and the disposition. The questions were answered. The patient or family acknowledged understanding and was agreeable with the care plan.      MEDICATIONS GIVEN IN THE EMERGENCY:  Medications - No data to display    NEW PRESCRIPTIONS STARTED AT TODAY'S ER VISIT  Discharge Medication List as of 2/26/2025  7:03 PM             =================================================================    HPI    Patient information was obtained from: Patient and     Use of Intrepreter: N/A       Randi Cleary is a 69 year old female with pertinent medical history of hypertension, hyperlipidemia, obesity, COPD, chronic pain, and atrial fibrillation who presents for evaluation of a cat bite.     Patient presents with a cat bite that she got on Friday. She was seen by her PCP after getting bit, and was started on 2 antibiotics. 2 days after starting  these, she noticed she had a headache, dizziness, and ear-ringing, so she stopped taking the antibiotics. She presents today stating she feels like her wound has worsened. She has no concern for health problems pertaining to the cat.    Patient denies puss drainage, discharge from wound, fever, chills, or any other complaints at this time.       PAST MEDICAL HISTORY:  Past Medical History:   Diagnosis Date    Bipolar 2 disorder (H)     Breast cancer (H) 1986    lumpectomy, radiation, chemo    Chronic pain syndrome     COPD (chronic obstructive pulmonary disease) (H)     asthma    Cord compression (H) 12/21/2021    Dizzy     Drug tolerance     opioid    Esophageal reflux     Fatigue     Generalized anxiety disorder     Graves disease 1994    Hemochromatosis 02/14/2018    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; H63D not detected    History of corticosteroid therapy 11/19/2019    History of partial adrenalectomy 11/19/2019    Hx antineoplastic chemotherapy     Hx of radiation therapy     Hyperlipidaemia     Hypertension     Impaired fasting glucose 2017    Infection due to 2019 novel coronavirus 06/04/2021    Injury of neck, whiplash 07/15/2021    Joint pain     KYAW (obstructive sleep apnea) 2016    Osteopenia     Paroxysmal atrial fibrillation (H) 11/2024    Pheochromocytoma, left 08/02/2017    laparoscopically removed    Postablative hypothyroidism 1995    Prediabetes 10/03/2019    by A1c    Psoriasis     Psoriatic arthropathy (H)     Pulmonary hypertension (H)     Right rotator cuff tear     RLS (restless legs syndrome)     on ropinorole    Sacroiliitis     Serotonin syndrome 08/28/2020    [facility] - While on desvenlafaxine 100mg    Snoring     Spinal stenosis     Status post coronary angiogram 10/03/2019    Urinary incontinence     Vitamin B 12 deficiency 2009    Vitamin D deficiency 2010       PAST SURGICAL HISTORY:  Past Surgical History:   Procedure Laterality Date    ARTHRODESIS ANKLE      ARTHROPLASTY ANKLE Right 6/29/2015     Procedure: ARTHROPLASTY ANKLE;  Surgeon: Jason Coughlin MD;  Location: [facility]    ARTHROPLASTY REVISION ANKLE Right 6/29/2015    Procedure: ARTHROPLASTY REVISION ANKLE;  Surgeon: Jason Coughlin MD;  Location: [facility]    BIOPSY BREAST      BREAST BIOPSY, CORE RT/LT      COLONOSCOPY      COLONOSCOPY N/A 2/25/2021    Procedure: COLONOSCOPY;  Surgeon: Guru Elke Tolbert MD;  Location:  GI    CV CORONARY ANGIOGRAM N/A 10/3/2019    Procedure: CV CORONARY ANGIOGRAM;  Surgeon: Bryce Pierre MD;  Location:  HEART CARDIAC CATH LAB    CV RIGHT HEART CATH MEASUREMENTS RECORDED N/A 10/3/2019    Procedure: CV RIGHT HEART CATH;  Surgeon: Bryce Pierre MD;  Location:  HEART CARDIAC CATH LAB    CV RIGHT HEART CATH MEASUREMENTS RECORDED N/A 9/25/2024    Procedure: Heart Cath Right Heart Cath;  Surgeon: George Becker MD;  Location:  HEART CARDIAC CATH LAB    ESOPHAGOSCOPY, GASTROSCOPY, DUODENOSCOPY (EGD), COMBINED N/A 2/25/2021    Procedure: ESOPHAGOGASTRODUODENOSCOPY, WITH BIOPSY;  Surgeon: Guru Elke Tolbert MD;  Location:  GI    EYE SURGERY  2021    HC REMOVE TONSILS/ADENOIDS,<13 Y/O      Description: Tonsillectomy With Adenoidectomy;  Recorded: 04/07/2010;    IR LUMBAR EPIDURAL STEROID INJECTION  10/26/2004    IR LUMBAR EPIDURAL STEROID INJECTION  11/16/2004    IR LUMBAR EPIDURAL STEROID INJECTION  12/21/2004    IR LUMBAR EPIDURAL STEROID INJECTION  6/8/2006    JOINT REPLACEMENT      LAMINOPLASTY CERVICAL POSTERIOR THREE+ LEVELS Left 12/21/2021    Procedure: CERVICAL 3-CERVICAL 6 LEFT OPEN DOOR LAMINOPLASTY AND LEFT CERVICAL 4-5 AND CERVICAL 6-7 POSTERIOR FORAMINOTOMY;  Surgeon: Angela Gregory MD;  Location: [facility]    LAPAROSCOPIC ADRENALECTOMY Left 08/02/2017    pheochromocytoma    LAPAROSCOPIC ADRENALECTOMY Left 8/2/2017    Procedure: LAPAROSCOPIC LEFT ADRENALECTOMY, ;  Surgeon: Gab Linares MD;  Location: [facility];   Service:     LENGTHEN TENDON ACHILLES Right 6/29/2015    Procedure: LENGTHEN TENDON ACHILLES;  Surgeon: Jason Coughlin MD;  Location: [facility]    LUMPECTOMY BREAST      LUMPECTOMY BREAST Left 1994    MAMMOPLASTY REDUCTION Right 01/13/2015    [place]    MAMMOPLASTY REDUCTION Right     approx late [id number]    MASTECTOMY      left lumpectomy with axillary node dissection    MASTECTOMY MODIFIED RADICAL      OTHER SURGICAL HISTORY Right     reconstructive breast surgery    OTHER SURGICAL HISTORY      Adrenalectomy for pheochromocytoma    NV MASTECTOMY, MODIFIED RADICAL      Description: Modified Radical Mastectomy Left Breast;  Recorded: 04/07/2010;    REPAIR HAMMER TOE Right 6/29/2015    Procedure: REPAIR HAMMER TOE;  Surgeon: Jason Coughlin MD;  Location: [facility]    TONSILLECTOMY      TONSILLECTOMY & ADENOIDECTOMY      ZC ARTHRODESIS,ANKLE,OPEN Right     Description: Ankle Arthrodesis;  Recorded: 04/07/2010;       CURRENT MEDICATIONS:    Prior to Admission Medications   Prescriptions Last Dose Informant Patient Reported? Taking?   Cyanocobalamin (VITAMIN B-12) 5000 MCG SUBL   Yes No   Sig: Place 2-3 sprays under the tongue every morning. Unknown dose. 2 or 3 sprays/day   Fluticasone-Umeclidin-Vilant (TRELEGY ELLIPTA) 100-62.5-25 MCG/ACT oral inhaler   No No   Sig: Inhale 1 puff into the lungs daily.   KLOR-CON M20 20 MEQ CR tablet   No No   Sig: Take 1 tablet (20 mEq) by mouth every morning.   Lavender Oil 80 MG CAPS   No No   Sig: Take 160 mg by mouth at bedtime.   MAGNESIUM PO   Yes No   Sig: Take 2 capsules by mouth 2 times daily. Strength unknown   STATIN NOT PRESCRIBED (INTENTIONAL)   Yes No   Sig: Please choose reason not prescribed from choices below.   SYNTHROID 150 MCG tablet   No No   Sig: Take 1 tablet (150 mcg) by mouth every morning. MON to SAT 1 tablet/day; SUN 0.5 tablet -   UNABLE TO FIND   Yes No   Sig: Take 1 tablet by mouth every morning. MEDICATION NAME: CETYL-MYRISFOLEATE   acetaminophen  (TYLENOL) 325 MG tablet   Yes No   Sig: Take 325-650 mg by mouth every 6 hours as needed for mild pain.   albuterol (VENTOLIN HFA) 108 (90 Base) MCG/ACT inhaler   No No   Sig: Inhale 2 puffs into the lungs every 6 hours as needed for shortness of breath, wheezing or cough   alendronate (FOSAMAX) 70 MG tablet   No No   Sig: Take 1 tablet (70 mg) by mouth every 7 days. Take 60 minutes before am meal with 8 oz. water. Remain upright for 30 minutes.   benzonatate (TESSALON) 100 MG capsule   No No   Sig: Take 1 capsule (100 mg) by mouth 3 times daily as needed for cough.   doxycycline hyclate (VIBRA-TABS) 100 MG tablet   No No   Sig: Take 1 tablet (100 mg) by mouth 2 times daily for 7 days.   ethacrynic acid (EDECRIN) 25 MG tablet   No No   Sig: Take 1 tablet (25 mg) by mouth daily.   gabapentin (NEURONTIN) 100 MG capsule   No No   Sig: Take 1-2 capsules (100-200 mg) by mouth at bedtime. May also take 1-2 capsules (100-200 mg) daily as needed for other (anxiety). Do not use the night before ECT..   guaiFENesin (MUCINEX) 600 MG 12 hr tablet   Yes No   Sig: Take 600 mg by mouth every morning.   ketoconazole (NIZORAL) 2 % external shampoo   Yes No   Sig: Apply topically daily as needed.   medical cannabis (Patient's own supply)   Yes No   Sig: See Admin Instructions (The purpose of this order is to document that the patient reports taking medical cannabis.  This is not a prescription, and is not used to certify that the patient has a qualifying medical condition.)  Flower   melatonin 1 MG CAPS   Yes No   Sig: Take 1 mg by mouth at bedtime.   metroNIDAZOLE (FLAGYL) 500 MG tablet   No No   Sig: Take 1 tablet (500 mg) by mouth 3 times daily for 7 days.   naloxone (NARCAN) 4 MG/0.1ML nasal spray   No No   Sig: Spray 1 spray (4 mg) into one nostril alternating nostrils as needed for opioid reversal every 2-3 minutes until assistance arrives   omeprazole (PRILOSEC) 20 MG DR capsule   No No   Sig: Take 1 capsule (20 mg) by mouth  "as needed.   oxyCODONE (ROXICODONE) 5 MG tablet   No No   Sig: Take 1 tablet (5 mg) by mouth 5 times daily. May dispense 25 for start 25   rOPINIRole (REQUIP) 2 MG tablet   No No   Sig: Take 1 tablet (2 mg) by mouth 3 times daily.   vitamin C (ASCORBIC ACID) 1000 MG TABS   Yes No   Sig: Take 1,000 mg by mouth every morning.   vitamin D3 (CHOLECALCIFEROL) 250 mcg (74818 units) capsule   Yes No   Sig: Take 1 capsule by mouth once a week.       Facility-Administered Medications: None       ALLERGIES:  Allergies   Allergen Reactions    Serotonin Reuptake Inhibitors (Ssris) Anxiety, Difficulty breathing, Headache, Palpitations and Shortness Of Breath    Buspirone      The patient states she had serotonin syndrome    Cephalexin      Other reaction(s): unknown rxn.    Desvenlafaxine      Serotonin syndrome    Diclofenac Sodium [Diclofenac]      Serotonin syndrome and restless legs syndrome    Gabapentin      Drove on the wrong side of the highway    Levofloxacin      \"CAN'T REMEMBER\"    Penicillins      \"SORES IN MOUTH\"    Riluzole Difficulty breathing and Swelling    Sulfa Antibiotics      Chronic cough in sulfa containing diuretic     Topiramate Other (See Comments)     Frequent urination    Dextromethorphan Anxiety       FAMILY HISTORY:  Family History   Problem Relation Age of Onset    Obesity Mother     Thyroid Disease Mother     Arthritis Mother     Hypertension Mother     Osteoporosis Mother     Hemochromatosis Father     Myocardial Infarction Father     Snoring Father     Heart Disease Father     Obesity Father     Coronary Artery Disease Father          at age 53 of heart attack    Hypertension Father     Genetic Disorder Father         Hemachromatosis    Lupus Sister     Hypertension Sister     Obesity Sister     Scleroderma Sister     Heart Failure Sister     Hemochromatosis Sister     Obesity Sister     Cardiovascular Sister     Hypertension Sister     Arthritis Sister     " Hemochromatosis Sister     Lupus Sister     Scleroderma Sister     Coronary Artery Disease Sister     Genetic Disorder Sister         Lupus, Hemachromatosis    Hemochromatosis Brother     Hypertension Brother     Alcoholism Brother     Substance Abuse Brother     Cardiovascular Brother     Hypertension Brother     Arthritis Brother     Hemochromatosis Brother     Prostate Cancer Brother     Genetic Disorder Brother         Hemachromatosis    Obesity Brother     Cardiovascular Maternal Grandmother     Coronary Artery Disease Maternal Grandmother     Osteoporosis Maternal Grandmother     Alcoholism Maternal Grandfather     Cerebrovascular Disease Paternal Grandmother     Mental Illness Maternal Uncle     Adrenal Disorder No family hx of     Breast Cancer No family hx of     Anesthesia Reaction No family hx of     Deep Vein Thrombosis (DVT) No family hx of        SOCIAL HISTORY:  Social History     Tobacco Use    Smoking status: Former     Current packs/day: 0.00     Average packs/day: 2.5 packs/day for 29.2 years (72.9 ttl pk-yrs)     Types: Cigarettes     Start date: 1971     Quit date: 2000     Years since quittin.6     Passive exposure: Current    Smokeless tobacco: Never   Vaping Use    Vaping status: Never Used   Substance Use Topics    Alcohol use: Not Currently     Comment: relapse 2021 sober     Drug use: Yes     Types: Marijuana     Comment: smoking and edibles daily        VITALS:  Patient Vitals for the past 24 hrs:   BP Temp Temp src Pulse Resp SpO2 Weight   25 1906 128/64 98.1  F (36.7  C) Oral -- 16 93 % --   25 1753 122/67 97.5  F (36.4  C) Oral 103 18 95 % 78 kg (172 lb)       PHYSICAL EXAM    General Appearance: Well-appearing, well-nourished, no acute distress  Head:  Normocephalic  Cardio:  Regular rate and rhythm  Pulm:  No respiratory distress  Extremities: Moves extremities normally, normal gait  Skin:  Skin warm, dry, no rashes, small area of erythema on dorsum of  right arm, no streaking.   Neuro:  Alert and oriented ×3, moving all extremities, no gross sensory defects         RADIOLOGY/LABS:  Reviewed all pertinent imaging. Please see official radiology report. All pertinent labs reviewed and interpreted.           The creation of this record is based on the scribe s observations of the work being performed by Alejandra Gray MD and the provider s statements to them. It was created on her behalf by Giorgi Rubin, a trained medical scribe. This document has been checked and approved by the attending provider.    Alejandra Gray MD  Emergency Medicine  [facility] EMERGENCY ROOM  [address] [facility] [address]  [phone]  Dept: [phone]     Alejandra Gray MD  02/26/25 1499

## 2025-02-26 NOTE — TELEPHONE ENCOUNTER
I do not have any additional antibiotics to give her at this time.  She is allergic to everything.  If she does not want take the medication, we will just have to continue monitoring things and if things get worse she will have to go to the ER for IV antibiotics

## 2025-02-26 NOTE — TELEPHONE ENCOUNTER
Relayed to patient. She said might decide to go to the ED since the bite area has become worse per patient- she knows where the closest location is. She had no further questions.
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
## 2025-03-03 RX ORDER — DOXYCYCLINE HYCLATE 100 MG
100 TABLET ORAL 2 TIMES DAILY
Qty: 14 TABLET | Refills: 0 | Status: SHIPPED | OUTPATIENT
Start: 2025-03-03

## 2025-03-03 RX ORDER — METRONIDAZOLE 500 MG/1
500 TABLET ORAL 3 TIMES DAILY
Qty: 21 TABLET | Refills: 0 | Status: SHIPPED | OUTPATIENT
Start: 2025-03-03 | End: 2025-03-06

## 2025-03-03 ASSESSMENT — ANXIETY QUESTIONNAIRES
3. WORRYING TOO MUCH ABOUT DIFFERENT THINGS: SEVERAL DAYS
GAD7 TOTAL SCORE: 16
7. FEELING AFRAID AS IF SOMETHING AWFUL MIGHT HAPPEN: MORE THAN HALF THE DAYS
8. IF YOU CHECKED OFF ANY PROBLEMS, HOW DIFFICULT HAVE THESE MADE IT FOR YOU TO DO YOUR WORK, TAKE CARE OF THINGS AT HOME, OR GET ALONG WITH OTHER PEOPLE?: EXTREMELY DIFFICULT
2. NOT BEING ABLE TO STOP OR CONTROL WORRYING: MORE THAN HALF THE DAYS
4. TROUBLE RELAXING: NEARLY EVERY DAY
GAD7 TOTAL SCORE: 16
GAD7 TOTAL SCORE: 16
7. FEELING AFRAID AS IF SOMETHING AWFUL MIGHT HAPPEN: MORE THAN HALF THE DAYS
6. BECOMING EASILY ANNOYED OR IRRITABLE: NEARLY EVERY DAY
IF YOU CHECKED OFF ANY PROBLEMS ON THIS QUESTIONNAIRE, HOW DIFFICULT HAVE THESE PROBLEMS MADE IT FOR YOU TO DO YOUR WORK, TAKE CARE OF THINGS AT HOME, OR GET ALONG WITH OTHER PEOPLE: EXTREMELY DIFFICULT
1. FEELING NERVOUS, ANXIOUS, OR ON EDGE: NEARLY EVERY DAY
5. BEING SO RESTLESS THAT IT IS HARD TO SIT STILL: MORE THAN HALF THE DAYS

## 2025-03-03 NOTE — TELEPHONE ENCOUNTER
"Outgoing call to patient. She ended up going to the ED on 2/26 for evaluation. They did not give IV antibiotics at this time as ED provider wrote  \"Patient presents requesting IV antibiotics specifically, but I do not think that this is clinically indicated.\"  There were no antibiotics given and patient was discharged with instructions to continue current antibiotics.     She has now completed 7 days of antibiotics and is requesting a \"bridge\" of the medications until she can be seen by PCP on 3/6. Patient pulled up the picture from the ED visit and states she thinks the redness appears about the same, maybe a little worse but she isn't exactly sure. She is going to ann the area of redness to see if it is spreading. Denies fever or drainage from the wound. Denies swelling or increased swelling at the site.     Routing to PCP to see if pt should continue Flagyl and doxycycline until Thursday appointment.     "

## 2025-03-03 NOTE — TELEPHONE ENCOUNTER
03/03/2025    Pt called and is requesting a refill on these 2 medications. Pt states the medications were for 7 days for a cat bite but it isn't completely cleared up yet. Pt would like refills on the 2 below medications.     doxycycline hyclate (VIBRA-TABS) 100 MG tablet     metroNIDAZOLE (FLAGYL) 500 MG tablet     Pt states she has an appointment on 03/06/2025 to see PCP but she needs refills before Thursday.    Stephanie Belle
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
## 2025-03-04 ENCOUNTER — TELEPHONE (OUTPATIENT)
Dept: PSYCHIATRY | Facility: CLINIC | Age: 70
End: 2025-03-04

## 2025-03-04 ENCOUNTER — OFFICE VISIT (OUTPATIENT)
Dept: PSYCHIATRY | Facility: CLINIC | Age: 70
End: 2025-03-04
Payer: MEDICARE

## 2025-03-04 VITALS
SYSTOLIC BLOOD PRESSURE: 119 MMHG | TEMPERATURE: 98.2 F | HEART RATE: 80 BPM | DIASTOLIC BLOOD PRESSURE: 81 MMHG | RESPIRATION RATE: 16 BRPM | OXYGEN SATURATION: 99 %

## 2025-03-04 DIAGNOSIS — F43.10 PTSD (POST-TRAUMATIC STRESS DISORDER): ICD-10-CM

## 2025-03-04 DIAGNOSIS — F41.1 GENERALIZED ANXIETY DISORDER: ICD-10-CM

## 2025-03-04 DIAGNOSIS — F33.3 MAJOR DEPRESSIVE DISORDER, RECURRENT, SEVERE WITH PSYCHOTIC SYMPTOMS (H): Primary | ICD-10-CM

## 2025-03-04 DIAGNOSIS — F60.3 BORDERLINE PERSONALITY DISORDER (H): ICD-10-CM

## 2025-03-04 DIAGNOSIS — E11.59 TYPE 2 DIABETES MELLITUS WITH OTHER CIRCULATORY COMPLICATION, UNSPECIFIED WHETHER LONG TERM INSULIN USE (H): ICD-10-CM

## 2025-03-04 ASSESSMENT — PAIN SCALES - GENERAL: PAINLEVEL_OUTOF10: SEVERE PAIN (7)

## 2025-03-04 NOTE — PATIENT INSTRUCTIONS
"Today's Recommendations:  -   - We still would benefit from finding a medication that you can at least tolerate. Latuda, which Jeannette is suggesting, is a reaosnable choice. At some point when you are feeling medically more stable, I do think trying the Auvelity was a good idea; it sounds to me like if you got anything, it was a bad withdrawal syndrome.  - You asked about vagus nerve stimulation (VNS). I think it could be a good idea, especially for reducing your ECT frequency. Information is below. We are basically stuck on this until/unless Medicare approves it for payment.    VNS has a tendency to work particularly well in patients who can respond to treatments, but who have trouble sustaining that response, e.g. who need frequent maintenance ECT or ketamine or TMS, or for whom medications work but then \"poop out\". I think you fit that pattern, which is one of the reasons behind this recommendation.  There is an emerging literature suggesting that VNS works very well for patients who have prior trauma or adverse childhood experiences that are driving their depression. My clinical experience bears this out -- I have seen patients become far more able to handle stressors in their life post-VNS. We do not know exactly why. Theories include changes in brain neurochemistry, alterations in how your brain interacts with your body, and the calming effect that VNS can have through its effects on the parasympathetic nervous system (which opposes the \"fight or flight\" response)    VNS is a surgical treatment -- it involves a small stimulating electrode wrapped around a nerve in your neck.  The  website, with at least some patient outcomes and useful information, is:      https://[employer]/    The most critical primary article (jargon-y but if you want to see it) is: https://ajp.psychiatryonline.org/doi/10.1176/appi.ajp.2017.82275947    If you want to hear a bit about the lived experience, this video is a " "patient of mine who gave a talk about her experience: https://www.youtube.com/watch?v=gL8CS2iqbC9&list=[email or web address]_[email or web address]&index=5    The big take-home points from that:  - VNS is what we would call an \"adjunctive\" treatment. It boosts the efficacy of other things and/or prevents relapse if we find something else that brings you to response. It usually does not work on its own.  - It is a surgical treatment, done as an outpatient surgery. It does not involve brain surgery, just nerves in the neck. Most people go home from that surgery the same day.  - Roughly 2/3 of people who get a VNS ultimately respond (have depressive improvement), but it often takes 6-12 months to get the benefit. That has included people who have not responded to anything else, including ECT. The chances are better for people who have responded to at least one other treatment, but there are no completely hopeless cases.  - The biggest side effects are voice changes (frequent, when the stimulator fires every five minutes) and pain in the throat/neck (temporary, fades within a week of dose adjustment). Some people experience blood pressure/heart rate changes or difficulty exercising, but there are ways to control the stimulator to prevent these from impairing your life.  - VNS insurance coverage is still variable. For Medicare, it requires enrolling in a clinical trial. Private insurers will pay for it about half the time. We do usually have to file an appeal or two, but we win most of our cases.  - VNS is intensive at first because we need to meet every two weeks to adjust the stimulator. Once it's adjusted, it becomes less frequent, and some patients only see us once a year for a battery check.  - It does require ongoing surgeries to change the battery, every six to eight years. These are uncomfortable, but relatively quick, some do not even need full anesthesia.    Right now, for people with Medicare and major depression, VNS " "is not available because Medicare is waiting for the results of the RECOVER study.           We are specifically a university and research clinic, and there are often research studies ongoing. Some of those are clinical trials of new brain stimulation treatments. Others are what we would call \"biomarker\" studies, where we ask you to participate in some kind of measurement while you are undergoing regular treatment.   If you are interested in hearing about research consider signing up for our research registry. This will allow researchers in our clinic to reach out if you become eligible for a study. Sign up today at [email or web address]    In some cases, a clinical trial is the only way to get access to an advanced treatment that is not covered by your insurance. Usually, we will talk about this in your visit if it is an option.      Patient Education       General Information:  Our Treatment-Resistant Disorders/Interventional Psychiatry clinic is what is often called a \"consultation\" or \"tertiary care\" clinic. That means we do not see patients long-term for medication management or talk therapy. We offer thorough evaluations, recommendations about medications/therapies you may have not yet tried, and in some cases, brain stimulation or office-based treatments. If you are likely to benefit from one of those advanced treatments, we will have talked about it today. Once that treatment is complete, we will see you once or twice afterwards to check in, and then you will return to getting ongoing psychiatric care from whomever you were seeing before you came for your evaluation with us. If for some reason you no longer have access to that clinician, we can help with a referral to our main [facility]. The only patients we see long-term are patients with implanted medical devices that require ongoing monitoring and programming.     Our recommendations almost always include some kind of " cognitive-behavioral therapy (CBT) if you are not getting it already. Brain and nerve stimulation treatments work best when combined with certain talk therapies. We make these recommendations because we strongly believe that, without the therapy piece, most people will not get better, or will have limited clinical benefit. It is often difficult or inconvenient to add therapy to an already busy schedule, but it is also necessary.    It is important to remember that, like all mental health treatments, our interventional therapies are not perfect. About one third of people will not feel better after treatment, and even when they work, they do not take away the symptoms entirely.If we have recommended something above, it means we think there is a better than even chance of it working, but things are never guaranteed. This is another reason we usually recommend CBT along with our advanced treatments -- it can address the symptoms that remain after the stimulation/ketamine treatment.       While we are waiting to implement the recommendations you and I have discussed, you should know what to do if your symptoms worsen. A variety of crisis numbers/resources are available. They include:    CRISIS GENERAL NUMBERS   4-496-ENRHUEX (1-616.718.7005)  OR  911     CRISIS INTERVENTION TEAM (CIT) - this is a POLICE UNIT, specially trained.  This website has information for all of [place]'s CITs. Lays out which areas have this team.  Http://[email or web address]/citmap/[email or web address].php  However, one can just call 911 and ask for this special team.   If police need to be called, DO ask for this team.    CRISIS MOBILE TEAMS  [and see end of this phrase*]  [facility] -COPE: 24hrs/7days:    [phone]  (Adults > 19yo)    [phone]  (Child   < 18yo)                                       FRONT DOOR (during the day could call)   [phone]    [facility] -[phone] (Adult)  [phone] (Child)     [place]  [facility] -[phone] (Adult and Child)     [facility] [place] -[phone] ([employer] mobile crisis team; Adult and Child; 24hr)     [facility] -[phone] (Adult and Child)     CRISIS HOUSING  [facility]                           [address]              [phone]  [facility]                                [address]                       [phone]  [facility]                               [address]     [phone]   [facility]  [address]                [phone]    [facility] EMERGENCY NUMBERS    Crisis Connection:                                [phone]  [facility]:     [phone]  Crisis Intervention:                                [phone] or [phone]   COPE: Mobile Team 24hrs/7days:    [phone]  (Adults > 19yo)                                                                            [phone]  (Child   < 16yo)  [facility]        [phone] 24/7 line and Mobile Team   [address] [place] [address]  DROP IN:  M-F: 8:00 am - 7:00 pm  Sat: 11:00 am - 3:00 pm   Sun: Closed     WALK-IN COUNSELING:  [facility]       [phone]    [address]:   M, W, F:  1:00-3:00PM    M-Th:  6:30-8:30PM    TRANS and LGBT  Call [employer] at [phone]. This service is staffed by trans people 24/7.   LGBT youth <24 contemplating suicide, call the [employer] ANTIQUES & CONSIGNMENT [phone].    POISON CONTROL CENTER  [phone]    OR  go to nearest ER    CHILD  \"[facility] has a needs assessment team who will do an intake evaluation. Based on the results of the intake they will recommended inpatient admission, partial hospitalization, intensive outpatient or outpatient care. The number is [phone]. \"    CRISIS TEXT " "LINE  Http://www.crisistextline.org  FREE SUPPORT AT YOUR FINGERTIPS,   Crisis Text Line serves anyone, in any type of crisis, providing access to free,  support and information via the medium people already use and trust: text. Here s how it works:  1)  Text 704838 from anywhere in the [place], anytime, about any type of crisis.  2)  A live, trained Crisis Counselor receives the text and responds quickly.      The volunteer Crisis Counselor will help you move from a 'hot moment to a cool moment'    [facility] EMERGENCY NUMBERS    Crisis Connection:                                [phone]  [facility]:              [phone]  Crisis Intervention:                                [phone] or [phone]   COPE: Mobile Team 24hrs/7days:    [phone]  (Adults > 17yo)                                                                            [phone]  (Child   < 18yo)  [facility]        [phone]  CALL 24 hours a day.  [address] [place] [address]  DROP IN:  M-F: 8:00 am - 7:00 pm  Sat: 11:00 am - 3:00 pm   Sun: Closed    WALK-IN COUNSELIN)  [facility]                                  [phone]        [address]:                  M, W:      5:00-7:00PM       2)  [facility]                            [phone]        [employer], [facility]                T, Th:      6:00-8:00PM    TRANS and LGBT  Call [employer] at [phone]. This service is staffed by trans people .   LGBT youth <24 contemplating suicide, call the [employer] [phone].    POISON CONTROL CENTER  [phone]    OR  go to nearest ER    CHILD  \"[facility] needs [facility] team who will do an intake evaluation. Based on the results of the intake they will recommended inpatient admission, partial hospitalization, intensive outpatient or outpatient care. The number is [phone] or [phone]. " "\"    CRISIS TEXT LINE  Http://www.crisistextline.org  FREE SUPPORT AT YOUR FINGERTIPS, 24/7  Crisis Text Line serves anyone, in any type of crisis, providing access to free, 24/7 support and information via the medium people already use and trust: text. Here s how it works:  1)  Text [phone] from anywhere in the [place], anytime, about any type of crisis.  2)  A live, trained Crisis Counselor receives the text and responds quickly.      The volunteer Crisis Counselor will help you move from a 'hot moment to a cool moment'      * A Community Paramedic (CP) is an advanced paramedic that works to increase access to primary and preventive care and decrease use of emergency departments, which in turn decreases health care costs. Among other things, CPs may play a key role in providing follow-up services after a hospital discharge to prevent hospital readmission. CPs can provide health assessments, chronic disease monitoring and education, medication management, immunizations and vaccinations, laboratory specimen collection, hospital discharge follow-up care and minor medical procedures. CPs work under the direction of an Ambulance Medical Director.   "

## 2025-03-04 NOTE — TELEPHONE ENCOUNTER
Writer called after pt called clinic to cancel group this morning and asked for a return call.    Reached voicemail, left a message with clinic number, and letting pt know that group will NOT be happening next week (3/11/25).    Arsenio Jiang, PhD LP on 3/4/2025 at 10:10 AM

## 2025-03-05 ENCOUNTER — VIRTUAL VISIT (OUTPATIENT)
Dept: PSYCHOLOGY | Facility: CLINIC | Age: 70
End: 2025-03-05
Payer: MEDICARE

## 2025-03-05 DIAGNOSIS — F41.1 GENERALIZED ANXIETY DISORDER: ICD-10-CM

## 2025-03-05 DIAGNOSIS — F33.2 MAJOR DEPRESSIVE DISORDER, RECURRENT SEVERE WITHOUT PSYCHOTIC FEATURES (H): Primary | ICD-10-CM

## 2025-03-05 PROCEDURE — 90837 PSYTX W PT 60 MINUTES: CPT | Mod: 95 | Performed by: MARRIAGE & FAMILY THERAPIST

## 2025-03-05 NOTE — PROGRESS NOTES
"[facility]                                     Progress Note    Patient Name: Randi Cleary  Date: 3/5/2025        Service Type: Individual      Session Start Time: 2:30  Session End Time: 3:30     Session Length: 53+    Session #: 20    Attendees: Client    Service Modality:  Video Visit:      Provider verified identity through the following two step process.  Patient provided:  Patient  and Patient address    Telemedicine Visit: The patient's condition can be safely assessed and treated via synchronous audio and visual telemedicine encounter.      Reason for Telemedicine Visit: Patient has requested telehealth visit    Originating Site (Patient Location): Patient's home    Distant Site (Provider Location): Provider Remote Setting- Home Office    Consent:  The patient/guardian has verbally consented to: the potential risks and benefits of telemedicine (video visit) versus in person care; bill my insurance or make self-payment for services provided; and responsibility for payment of non-covered services.     Patient would like the video invitation sent by:  My Chart    Mode of Communication:  Video Conference via Amwell    Distant Location (Provider):  Off-site    As the provider I attest to compliance with applicable laws and regulations related to telemedicine.    DATA  Interactive Complexity: No  Crisis: No        Progress Since Last Session (Related to Symptoms / Goals / Homework):   Symptoms: No change      Homework:  n/a      Episode of Care Goals: Satisfactory progress - ACTION (Actively working towards change); Intervened by reinforcing change plan / affirming steps taken     Current / Ongoing Stressors and Concerns:   Patient is distressed about several things and indicates she was upset about how she handled a doctor appointment yesterday.  She felt anxiety increasing as she struggled to verbalize her thoughts.  She is worried about not finding the \"right\" medication.  " Reflected on a box of family materials she received from nephew which brought feelings related to family of origin traumas and conflicts.  Distress related to political and national news.     Treatment Objective(s) Addressed in This Session:   Distress tolerance     Intervention:   Validation, safety assessment.  Encouragement.  Small steps toward larger goals.  Setting boundaries on intense experiences/information in order to promote self-care.  Discussion and psychoeducation about trauma.    Assessments completed prior to visit:  The following assessments were completed by patient for this visit:  PHQ9:       1/28/2025     9:34 AM 2/3/2025    11:13 AM 2/10/2025    11:46 AM 2/17/2025     9:49 AM 2/24/2025     9:43 AM 3/3/2025    10:28 AM 3/5/2025    11:44 AM   PHQ-9 SCORE   PHQ-9 Total Score MyChart 15 (Moderately severe depression) 11 (Moderate depression) 15 (Moderately severe depression) 16 (Moderately severe depression) 15 (Moderately severe depression) 15 (Moderately severe depression) 15 (Moderately severe depression)   PHQ-9 Total Score 15  11  15  16  15  15  15        Patient-reported     GAD7:       10/22/2024     8:59 AM 11/4/2024     2:38 PM 12/3/2024     9:10 AM 12/17/2024     9:13 AM 1/23/2025     8:47 AM 2/10/2025    10:22 PM 3/3/2025    10:45 AM   CHAVO-7 SCORE   Total Score 11 (moderate anxiety) 10 (moderate anxiety) 17 (severe anxiety) 13 (moderate anxiety) 11 (moderate anxiety) 14 (moderate anxiety) 16 (severe anxiety)   Total Score 11    11 10  17  13  11  14  16        Patient-reported     CAGE-AID:       6/22/2020     7:12 PM 1/18/2022    11:15 PM 6/6/2024     8:00 AM   CAGE-AID Total Score   Total Score 4 4 4   Total Score MyChart 4 (A total score of 2 or greater is considered clinically significant) 4 (A total score of 2 or greater is considered clinically significant)      PROMIS 10-Global Health (all questions and answers displayed):       10/30/2024     8:48 AM 11/6/2024    12:54 PM  11/14/2024    11:35 AM 11/26/2024     5:00 PM 12/4/2024     9:28 AM 12/18/2024     9:10 AM 12/30/2024     5:44 PM   PROMIS 10   In general, would you say your health is: Fair Fair Fair  Good Fair Fair   In general, would you say your quality of life is: Poor Poor Poor  Poor Fair Poor   In general, how would you rate your physical health? Poor Good Fair  Good Fair Fair   In general, how would you rate your mental health, including your mood and your ability to think? Fair Fair Fair  Fair Fair Poor   In general, how would you rate your satisfaction with your social activities and relationships? Fair Poor Poor  Poor Poor Poor   In general, please rate how well you carry out your usual social activities and roles Poor Poor Fair  Poor Poor Poor   To what extent are you able to carry out your everyday physical activities such as walking, climbing stairs, carrying groceries, or moving a chair? Moderately Moderately Moderately  Moderately Moderately Moderately   In the past 7 days, how often have you been bothered by emotional problems such as feeling anxious, depressed, or irritable? Often Often Often  Often Often Often   In the past 7 days, how would you rate your fatigue on average? Moderate Moderate Moderate  Moderate Moderate Moderate   In the past 7 days, how would you rate your pain on average, where 0 means no pain, and 10 means worst imaginable pain? 7 7 7  8 7 8   In general, would you say your health is: 2 2 2  3 2 2   In general, would you say your quality of life is: 1 1 1  1 2 1   In general, how would you rate your physical health? 1 3 2  3 2 2   In general, how would you rate your mental health, including your mood and your ability to think? 2 2 2  2 2 1   In general, how would you rate your satisfaction with your social activities and relationships? 2 1 1  1 1 1   In general, please rate how well you carry out your usual social activities and roles. (This includes activities at home, at work and in your  community, and responsibilities as a parent, child, spouse, employee, friend, etc.) 1 1 2  1 1 1   To what extent are you able to carry out your everyday physical activities such as walking, climbing stairs, carrying groceries, or moving a chair? 3 3 3  3 3 3   In the past 7 days, how often have you been bothered by emotional problems such as feeling anxious, depressed, or irritable? 4 4 4  4 4 4   In the past 7 days, how would you rate your fatigue on average? 3 3 3  3 3 3   In the past 7 days, how would you rate your pain on average, where 0 means no pain, and 10 means worst imaginable pain? 7 7 7  8 7 8   Global Mental Health Score 7  6  6   6  7  5    Global Physical Health Score 9  11  10   11  10  10    PROMIS TOTAL - SUBSCORES 16  17  16   17  17  15        Information is confidential and restricted. Go to Review Flowsheets to unlock data.    Patient-reported     [place] Suicide Severity Rating Scale (Lifetime/Recent)      1/9/2023     1:00 PM 5/21/2024     4:49 PM 5/21/2024     9:00 PM 6/6/2024     8:00 AM 7/10/2024    12:00 PM 11/22/2024    11:05 AM 2/26/2025     5:59 PM   [place] Suicide Severity Rating (Lifetime/Recent)   Q1 Wish to be Dead (Lifetime)   Yes       Comments   OD on medications       Q2 Non-Specific Active Suicidal Thoughts (Lifetime)   No       Most Severe Ideation Rating (Lifetime)   5       Most Severe Ideation Description (Lifetime)   OD on medication       Frequency (Lifetime)   3       Duration (Lifetime)   3       Controllability (Lifetime)   2       Protective Factors  (Lifetime)   4       Reasons for Ideation (Lifetime)   5       Q1 Wished to be Dead (Past Month) yes 1-->yes 1-->yes 1-->yes  0-->no 0-->no   Q2 Suicidal Thoughts (Past Month) no 1-->yes 1-->yes 1-->yes  0-->no 0-->no   Q3 Suicidal Thought Method no 0-->no 0-->no 1-->yes      Q4 Suicidal Intent without Specific Plan no 1-->yes 0-->no 0-->no      Q5 Suicide Intent with Specific Plan no 0-->no 0-->no 1-->yes      Q6  Suicide Behavior (Lifetime) yes 1-->yes 0-->no 1-->yes  0-->no 0-->no   If yes to Q6, within past 3 months? no 1-->yes 0-->no 0-->no      Level of Risk per Screen moderate risk high risk moderate risk high risk  no risks indicated no risks indicated   1. Wish to be Dead (Lifetime)     N     2. Non-Specific Active Suicidal Thoughts (Lifetime)     N           ASSESSMENT: Current Emotional / Mental Status (status of significant symptoms):   Risk status (Self / Other harm or suicidal ideation)   Patient denies current fears or concerns for personal safety.   Patient denies current or recent suicidal ideation or behaviors.   Patient denies current or recent homicidal ideation or behaviors.   Patient denies current or recent self injurious behavior or ideation.   Patient denies other safety concerns.   Patient reports there has been no change in risk factors since their last session.     Patient reports there has been no change in protective factors since their last session.     A safety and risk management plan has been developed including: Patient consented to co-developed safety plan on 6/4/24.  Safety and risk management plan was reviewed.   Patient agreed to use safety plan should any safety concerns arise.  A copy was made available to the patient.     Appearance:   Appropriate    Eye Contact:   Good    Psychomotor Behavior: Normal    Attitude:   Friendly Pleasant   Orientation:   All   Speech    Rate / Production: Slow     Volume:  Normal    Mood:    Anxious    Affect:    Subdued    Thought Content:  Clear    Thought Form:  Goal Directed    Insight:    Good      Medication Review:   No changes to current psychiatric medication(s)     Medication Compliance:   Yes     Changes in Health Issues:   None reported     Chemical Use Review:   Substance Use: Chemical use reviewed, no active concerns identified      Tobacco Use: No current tobacco use.      Diagnosis:  1. Major depressive disorder, recurrent severe without  psychotic features (H)    2. Generalized anxiety disorder        Collateral Reports Completed:   Not Applicable    PLAN: (Patient Tasks / Therapist Tasks / Other)  Continue following safety plan.  Limit exposure/intake of political information.  Spend additional time outside.  Therapist will look into therapy groups [place]. Plan to work on trauma in future sessions.       Erika Colorado, LMFT                                                         ______________________________________________________________________    Individual Treatment Plan    Patient's Name: Randi Cleary  YOB: 1955    Date of Creation: 7/17/2024  Date Treatment Plan Last Reviewed/Revised: 7/17/2024, 10/30/24, 2/3/2025    DSM5 Diagnoses:   296.33 (F33.2) Major Depressive Disorder, Recurrent Episode, Severe   300.02 (F41.1) Generalized Anxiety Disorder  Psychosocial / Contextual Factors: history of trauma  PROMIS (reviewed every 90 days):     Referral / Collaboration:  Discussed and patient will pursue a therapy group for depressive symptoms.    Anticipated number of session for this episode of care: 9-12 sessions  Anticipation frequency of session: Weekly  Anticipated Duration of each session: 38-52 minutes  Treatment plan will be reviewed in 90 days or when goals have been changed.       MeasurableTreatment Goal(s) related to diagnosis / functional impairment(s)  Goal 1: Client will keep self safe and eliminate suicidal ideation and self-harm behaviors.    Objective #A (Client Action)    Client will make a list of at least 10 skills or activities that you will to use to distract from urges to harm self.  Status: Completed - Date: 10/30/24       Intervention(s)  Therapist will provide ideas and strategies for generating coping skills list.    Objective #B  Client will make a list of pros and cons for tolerating and not tolerating an urge to harm self.  Status: Continued - Date(s):  2/3/2025    Intervention(s)  Therapist will guide client through DBT-style Pros/Cons list for behavior change.    Objective #C  Client will identify and practice the new strategies for dealing with strong emotions, learn and practice relaxation breathing.  Status: Continued - Date(s): 3/05/25      Intervention(s)  Therapist will teach distraction skills. Practice them within session and outside of session.    Goal 2: Client will report reduction in anxiety also as evidenced by reduction of CHAVO-7 score below 6 points within the next 12 weeks.    Objective #A (Client Action)    Client will identify at least three triggers for anxiety.  Status: Completed - Date: 10/30/24       Intervention(s)  Therapist will provide educational materials on common triggers and signs of anxiety.    Objective #B  Client will use relaxation strategies at least two times per day to reduce the physical symptoms of anxiety.  Status: Continued - Date(s): 2/3/2025    Intervention(s)  Therapist will teach relaxation strategies such as mindfulness, deep breathing, muscle relaxation, and sensory activities.    Objective #C  Client will use cognitive strategies identified in therapy to challenge anxious thoughts.  Status: Continued - Date(s): 2/3/2025    Intervention(s)  Therapist will teach strategies for cognitive modification using REBT model.    Goal 3: Client will report improved mood as indicated by PHQ-9 score below 6 for consistent 8 weeks.    Objective #A (Client Action)    Status: Continued - Date: 2/3/2025    Client will Increase interest, engagement, and pleasure in doing things.    Intervention(s)  Therapist will assign homework for daily involvement in pleasant activities.    Objective #B  Client will Identify negative self-talk and behaviors: challenge core beliefs, myths, and actions.    Status: Continued - Date(s): 2/3/2025    Intervention(s)  Therapist will teach strategies for cognitive modification using REBT  models.    Objective #C  Client will Improve quantity and quality of night time sleep / decrease daytime naps.  Status: Continued - Date(s): 2/3/2025    Intervention(s)  Therapist will teach sleep hygiene strategies.  Assign homework for daily practice.    Goal 4: Client will report reduced or eliminated physiological activation when recalling a distressing event including decreased re-experiencing, decreased avoidance, and decreased hyperarousal.    Objective #A (Client Action)    Status:  Continued: 2/3/25       Client will acquire knowledge of trauma, common symptoms post-trauma, emotion regulation strategies, stress management strategies, and cognitive coping strategies.    Intervention(s)  Therapist will teach about effects of trauma on mind and body; encourage emotion identification and expression; practice with client the stress management strategies; and teach cognitive modification.    Objective #B  Client will use Brainspotting while focusing on physiological sensations related to distressing events.  Client will assess and rate level of physiological activation.  Status: Continued - Date(s): 2/3/2025    Intervention(s)  Therapist will provide use a pointer or other materials to assist client in holding a brainspot in their visual field.  Therapist might also provide biolateral music through headphones to deepen the experience.         Patient has reviewed and agreed to the above plan.      Erika Colorado, LMFT  July 17, 2024

## 2025-03-07 ASSESSMENT — PAIN SCALES - PAIN ENJOYMENT GENERAL ACTIVITY SCALE (PEG)
INTERFERED_GENERAL_ACTIVITY: 9
PEG_TOTALSCORE: 8.33
INTERFERED_ENJOYMENT_LIFE: 9
AVG_PAIN_PASTWEEK: 7

## 2025-03-10 ENCOUNTER — OFFICE VISIT (OUTPATIENT)
Dept: PALLIATIVE MEDICINE | Facility: OTHER | Age: 70
End: 2025-03-10
Attending: ANESTHESIOLOGY
Payer: MEDICARE

## 2025-03-10 VITALS — OXYGEN SATURATION: 95 % | HEART RATE: 96 BPM | DIASTOLIC BLOOD PRESSURE: 66 MMHG | SYSTOLIC BLOOD PRESSURE: 111 MMHG

## 2025-03-10 DIAGNOSIS — E55.9 VITAMIN D DEFICIENCY, UNSPECIFIED: ICD-10-CM

## 2025-03-10 DIAGNOSIS — M19.071 OSTEOARTHRITIS OF RIGHT ANKLE AND FOOT: ICD-10-CM

## 2025-03-10 PROCEDURE — G2211 COMPLEX E/M VISIT ADD ON: HCPCS | Performed by: ANESTHESIOLOGY

## 2025-03-10 PROCEDURE — 3074F SYST BP LT 130 MM HG: CPT | Performed by: ANESTHESIOLOGY

## 2025-03-10 PROCEDURE — 99214 OFFICE O/P EST MOD 30 MIN: CPT | Performed by: ANESTHESIOLOGY

## 2025-03-10 PROCEDURE — G0463 HOSPITAL OUTPT CLINIC VISIT: HCPCS | Performed by: ANESTHESIOLOGY

## 2025-03-10 PROCEDURE — 3078F DIAST BP <80 MM HG: CPT | Performed by: ANESTHESIOLOGY

## 2025-03-10 RX ORDER — OXYCODONE HYDROCHLORIDE 5 MG/1
5 TABLET ORAL
Qty: 70 TABLET | Refills: 0 | Status: SHIPPED | OUTPATIENT
Start: 2025-03-10

## 2025-03-10 NOTE — PROGRESS NOTES
[facility] Follow-up    Date of visit: 3/10/2025    Chief complaint:   Chief Complaint   Patient presents with    Pain     Followed for persistent spinal pain, osteoarthritis.  Comorbid mood disorder.    She reviews feeling better in some ways.  She is glad not to be in the hospital.  She is future oriented as she reviews some plans she has, financial interests.    Reviewing the atrial fibrillation may have been part related to psychotropic medications, adjuvants with dextromethorphan.    Reviews over the years losing weight from 280, as low as 160 back to 170.  Some things have been improved.  Wonders if she is more sensitive to other medications in that regard.    Continues electroconvulsive therapy every other week.  Describes in some ways benefits, feels that of fog is lifted that she has had with her longtime depression.    There is some discussion about IV ketamine.  They have been having a hard time finding antidepressants he can take orally, history of serotonin syndrome and others.    Reviewed falling in January, had to hold on ECT.  Shoulder pain is being evaluated.    She is trying to do some strength training would like to get back into the pool.  We reviewed exercise can help with the BNF, as does electroconvulsive therapy.    Concerned that her hands are having more arthritis, demonstrates her PIPs appear swollen.    Has used medical cannabis at times, helpful to some aspects of pain needs to be renewed.    Continues with the oxycodone 5 mg taking 5 times in the day.  Last urine drug test in August.   reviewed.    Reviewed vitamin D level has not been checked in a while, discussed the importance to optimize that to help with many aspects including depression.            Medications:  Current Outpatient Medications   Medication Sig Dispense Refill    acetaminophen (TYLENOL) 325 MG tablet Take 325-650 mg by mouth every 6 hours as needed for mild  pain.      albuterol (VENTOLIN HFA) 108 (90 Base) MCG/ACT inhaler Inhale 2 puffs into the lungs every 6 hours as needed for shortness of breath, wheezing or cough 18 g 11    benzonatate (TESSALON) 100 MG capsule Take 1 capsule (100 mg) by mouth 3 times daily as needed for cough. 90 capsule 1    clindamycin (CLEOCIN) 150 MG capsule Take 3 capsules (450 mg) by mouth 3 times daily for 10 days. 90 capsule 0    Cyanocobalamin (VITAMIN B-12) 5000 MCG SUBL Place 2-3 sprays under the tongue every morning. Unknown dose. 2 or 3 sprays/day      doxycycline hyclate (VIBRA-TABS) 100 MG tablet Take 1 tablet (100 mg) by mouth 2 times daily. 10 tablet 0    doxycycline hyclate (VIBRA-TABS) 100 MG tablet Take 1 tablet (100 mg) by mouth 2 times daily. 14 tablet 0    ethacrynic acid (EDECRIN) 25 MG tablet Take 1 tablet (25 mg) by mouth daily. 30 tablet 0    gabapentin (NEURONTIN) 100 MG capsule Take 1-2 capsules (100-200 mg) by mouth at bedtime. May also take 1-2 capsules (100-200 mg) daily as needed for other (anxiety). Do not use the night before ECT.. 120 capsule 2    guaiFENesin (MUCINEX) 600 MG 12 hr tablet Take 600 mg by mouth every morning.      ketoconazole (NIZORAL) 2 % external shampoo Apply topically daily as needed.      KLOR-CON M20 20 MEQ CR tablet Take 1 tablet (20 mEq) by mouth every morning. 90 tablet 3    Lavender Oil 80 MG CAPS Take 160 mg by mouth at bedtime.      MAGNESIUM PO Take 2 capsules by mouth 2 times daily. Strength unknown      medical cannabis (Patient's own supply) See Admin Instructions (The purpose of this order is to document that the patient reports taking medical cannabis.  This is not a prescription, and is not used to certify that the patient has a qualifying medical condition.)  Flower      melatonin 1 MG CAPS Take 1 mg by mouth at bedtime.      naloxone (NARCAN) 4 MG/0.1ML nasal spray Spray 1 spray (4 mg) into one nostril alternating nostrils as needed for opioid reversal every 2-3 minutes until  assistance arrives 0.2 mL 0    omeprazole (PRILOSEC) 20 MG DR capsule Take 1 capsule (20 mg) by mouth as needed. 90 capsule 3    oxyCODONE (ROXICODONE) 5 MG tablet Take 1 tablet (5 mg) by mouth 5 times daily. May dispense 3/19 for 3/21 70 tablet 0    rOPINIRole (REQUIP) 2 MG tablet Take 1 tablet (2 mg) by mouth 3 times daily. 270 tablet 3    STATIN NOT PRESCRIBED (INTENTIONAL) Please choose reason not prescribed from choices below.      SYNTHROID 150 MCG tablet Take 1 tablet (150 mcg) by mouth every morning. MON to SAT 1 tablet/day; SUN 0.5 tablet - 90 tablet 4    UNABLE TO FIND Take 1 tablet by mouth every morning. MEDICATION NAME: CETYL-MYRISFOLEATE      vitamin C (ASCORBIC ACID) 1000 MG TABS Take 1,000 mg by mouth every morning.      vitamin D3 (CHOLECALCIFEROL) 250 mcg (30872 units) capsule Take 1 capsule by mouth once a week. SUNDAY'S             Physical Exam:  Blood pressure 111/66, pulse 96, SpO2 95%, not currently breastfeeding.    Alert, clear sensorium, no respiratory distress, no pain behavior.  Affect brighter than when last seen.      Assessment:   Persistent spinal pain, osteoarthritis, has found the medical cannabis and this dose of oxycodone helpful to some extent.    Comorbid depression, finding some benefit with the ECT, some cognitive concerns.    Plan: Reviewed the role of optimizing vitamin D.    Reviewed the gut bacteria that make the neurotransmitters that the medications act upon.  Discussed approaches to help optimize them including probiotics, prebiotic's.    Will continue the medical cannabis program due to be renewed.    Discussed the supplements cetyl Myristoleate to help with the osteoarthritis.    She will call questions follow-up in 2 months.    Total time 35 minutes    The longitudinal plan of care for the diagnosis(es)/condition(s) as documented were addressed during this visit. Due to the added complexity in care, I will continue to support Winnie in the subsequent management and  with ongoing continuity of care. minutes spent on the date of encounter doing chart review, history, and exam documentation and further activities as noted above.     Dusty Dubois MD  [facility]

## 2025-03-10 NOTE — PROGRESS NOTES
Patient presents to the clinic today for a visit with AXEL WIGGINS MD regarding Pain Management.          12/31/2024     3:33 PM 3/10/2025     8:19 AM 3/10/2025     8:21 AM   PEG Score   PEG Total Score 6.33 8 8       UDS/CSA- 08.01.2024    Medications- Oxycodone this am    Notes    Lorena Loo  [facility] Clinical Assistant

## 2025-03-10 NOTE — PATIENT INSTRUCTIONS
PLAN:    Vitamin D level to be checked.    Continue the oxycodone 5 mg 5 times a day.    Dr. Dubois to renew the medical cannabis program    You will return to pool therapy schedule orientation.  Discussed the importance of strength training and pool therapy as exercise can increase BDNF (brain derived neurotrophic growth factor) as does ECT, and can help with osteoporosis.      Will renew the CM0 supplement with tumeric.    Discussed with the pharmacist at [facility] supplements for polyphenol's to augment the neurotransmitters.    You are considering shoulder surgery this summer.    Follow-up with Dr. Dubois return visit in 3 months

## 2025-03-11 ENCOUNTER — ANCILLARY PROCEDURE (OUTPATIENT)
Dept: GENERAL RADIOLOGY | Facility: CLINIC | Age: 70
End: 2025-03-11
Attending: INTERNAL MEDICINE
Payer: MEDICARE

## 2025-03-11 ENCOUNTER — OFFICE VISIT (OUTPATIENT)
Dept: INTERNAL MEDICINE | Facility: CLINIC | Age: 70
End: 2025-03-11
Payer: MEDICARE

## 2025-03-11 VITALS
OXYGEN SATURATION: 94 % | TEMPERATURE: 98.6 F | HEIGHT: 65 IN | DIASTOLIC BLOOD PRESSURE: 54 MMHG | BODY MASS INDEX: 29.32 KG/M2 | SYSTOLIC BLOOD PRESSURE: 88 MMHG | HEART RATE: 88 BPM | RESPIRATION RATE: 16 BRPM | WEIGHT: 176 LBS

## 2025-03-11 DIAGNOSIS — M19.071 OSTEOARTHRITIS OF RIGHT ANKLE AND FOOT: ICD-10-CM

## 2025-03-11 DIAGNOSIS — Z78.9 MEASLES, MUMPS, RUBELLA (MMR) VACCINATION STATUS UNKNOWN: ICD-10-CM

## 2025-03-11 DIAGNOSIS — E55.9 VITAMIN D DEFICIENCY, UNSPECIFIED: ICD-10-CM

## 2025-03-11 DIAGNOSIS — R60.9 EDEMA, UNSPECIFIED TYPE: ICD-10-CM

## 2025-03-11 DIAGNOSIS — E83.110 HEREDITARY HEMOCHROMATOSIS: ICD-10-CM

## 2025-03-11 DIAGNOSIS — Z12.11 SCREEN FOR COLON CANCER: Primary | ICD-10-CM
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
## 2025-03-11 LAB
MEV IGG SER IA-ACNC: >300 AU/ML
MEV IGG SER IA-ACNC: POSITIVE
VIT D+METAB SERPL-MCNC: 33 NG/ML (ref 20–50)

## 2025-03-11 PROCEDURE — 3078F DIAST BP <80 MM HG: CPT | Performed by: NURSE PRACTITIONER

## 2025-03-11 PROCEDURE — 99214 OFFICE O/P EST MOD 30 MIN: CPT | Performed by: NURSE PRACTITIONER

## 2025-03-11 PROCEDURE — 3074F SYST BP LT 130 MM HG: CPT | Performed by: NURSE PRACTITIONER

## 2025-03-11 PROCEDURE — 86765 RUBEOLA ANTIBODY: CPT | Performed by: NURSE PRACTITIONER

## 2025-03-11 PROCEDURE — 82306 VITAMIN D 25 HYDROXY: CPT | Performed by: NURSE PRACTITIONER

## 2025-03-11 PROCEDURE — G2211 COMPLEX E/M VISIT ADD ON: HCPCS | Performed by: NURSE PRACTITIONER

## 2025-03-11 PROCEDURE — 36415 COLL VENOUS BLD VENIPUNCTURE: CPT | Performed by: NURSE PRACTITIONER

## 2025-03-11 PROCEDURE — 73130 X-RAY EXAM OF HAND: CPT | Mod: TC | Performed by: RADIOLOGY

## 2025-03-11 NOTE — PROGRESS NOTES
"  Assessment & Plan     Screen for colon cancer  - Colonoscopy Screening  Referral; Future    Edema, unspecified type  Originally prescribed ethacrynic acid by the cardiologist working her up for pulmonary hypertension.    She has found that this medication helps tremendously with swelling in her hands and legs.  She wants to continue on the medication and use it as needed.    I told her I could take over the prescription but that we need to monitor her blood pressure prior to administration.    Cat bite, sequela  We switched her metronidazole to clindamycin and had her continue on doxycycline.  Her cat bite infection has healed up nicely and is almost completely resolved    Measles, mumps, rubella (MMR) vaccination status unknown  She wonders if she would benefit from an MMR booster due to concerns about measles in the community  - Rubeola Antibody IgG; Future    Patient Instructions   I can take over your ethacrynic acid prescription.  I would asked that you use this no more than a couple of times per week as needed for swelling.    You also need to check your blood pressure before using this medication.  Do not take this medication if you are systolic blood pressure is less than 110, or if bottom number is less than 60.    Your wrist is looking much better.  Finish out your doxycycline and clindamycin prescriptions.    You can get another COVID-vaccine in about a month.    We will check labs today to see if you would benefit from repeat MMR.    If you want a shingles vaccine, I would get this at the pharmacy    The longitudinal plan of care for the diagnosis(es)/condition(s) as documented were addressed during this visit. Due to the added complexity in care, I will continue to support Winnie in the subsequent management and with ongoing continuity of care.        BMI  Estimated body mass index is 29.29 kg/m  as calculated from the following:    Height as of this encounter: 1.651 m (5' 5\").    Weight as of " "this encounter: 79.8 kg (176 lb).             Subjective   Winnie is a 69 year old, presenting for the following health issues:  Follow Up (1 week follow up, cat bite right wrist, still taking ABX, looks much better, swelling down and redness much better)    History of Present Illness       Reason for visit:  Follow  up cat bite right wrist    She eats 2-3 servings of fruits and vegetables daily.She consumes 0 sweetened beverage(s) daily. She exercises with enough effort to increase her heart rate 3 or less days per week.   She is taking medications regularly.                      Objective    BP (!) 88/54 (BP Location: Right arm, Patient Position: Sitting, Cuff Size: Adult Regular)   Pulse 88   Temp 98.6  F (37  C)   Resp 16   Ht 1.651 m (5' 5\")   Wt 79.8 kg (176 lb)   LMP  (LMP Unknown)   SpO2 94%   BMI 29.29 kg/m    Body mass index is 29.29 kg/m .  Physical Exam               Signed Electronically by: Kaushik Hobbs, SOLO    "

## 2025-03-11 NOTE — PATIENT INSTRUCTIONS
I can take over your ethacrynic acid prescription.  I would asked that you use this no more than a couple of times per week as needed for swelling.    You also need to check your blood pressure before using this medication.  Do not take this medication if you are systolic blood pressure is less than 110, or if bottom number is less than 60.    Your wrist is looking much better.  Finish out your doxycycline and clindamycin prescriptions.    You can get another COVID-vaccine in about a month.    We will check labs today to see if you would benefit from repeat MMR.    If you want a shingles vaccine, I would get this at the pharmacy

## 2025-03-12 ENCOUNTER — ANESTHESIA (OUTPATIENT)
Dept: BEHAVIORAL HEALTH | Facility: CLINIC | Age: 70
End: 2025-03-12
Payer: MEDICARE

## 2025-03-12 ENCOUNTER — ANESTHESIA EVENT (OUTPATIENT)
Dept: BEHAVIORAL HEALTH | Facility: CLINIC | Age: 70
End: 2025-03-12

## 2025-03-12 ENCOUNTER — HOSPITAL ENCOUNTER (OUTPATIENT)
Dept: BEHAVIORAL HEALTH | Facility: CLINIC | Age: 70
Discharge: HOME | End: 2025-03-12
Attending: PSYCHIATRY & NEUROLOGY
Payer: MEDICARE

## 2025-03-12 ENCOUNTER — VIRTUAL VISIT (OUTPATIENT)
Dept: PSYCHOLOGY | Facility: CLINIC | Age: 70
End: 2025-03-12
Payer: MEDICARE

## 2025-03-12 VITALS
OXYGEN SATURATION: 93 % | HEART RATE: 79 BPM | SYSTOLIC BLOOD PRESSURE: 104 MMHG | DIASTOLIC BLOOD PRESSURE: 82 MMHG | TEMPERATURE: 97.8 F | RESPIRATION RATE: 16 BRPM

## 2025-03-12 DIAGNOSIS — F41.1 GENERALIZED ANXIETY DISORDER: ICD-10-CM

## 2025-03-12 DIAGNOSIS — F33.2 SEVERE RECURRENT MAJOR DEPRESSION WITHOUT PSYCHOTIC FEATURES (H): Primary | ICD-10-CM

## 2025-03-12 DIAGNOSIS — F33.2 MAJOR DEPRESSIVE DISORDER, RECURRENT SEVERE WITHOUT PSYCHOTIC FEATURES (H): Primary | ICD-10-CM

## 2025-03-12 PROCEDURE — 90834 PSYTX W PT 45 MINUTES: CPT | Mod: 95 | Performed by: MARRIAGE & FAMILY THERAPIST

## 2025-03-12 PROCEDURE — 250N000009 HC RX 250: Performed by: STUDENT IN AN ORGANIZED HEALTH CARE EDUCATION/TRAINING PROGRAM

## 2025-03-12 PROCEDURE — 370N000017 HC ANESTHESIA TECHNICAL FEE, PER MIN: Performed by: STUDENT IN AN ORGANIZED HEALTH CARE EDUCATION/TRAINING PROGRAM

## 2025-03-12 PROCEDURE — 90870 ELECTROCONVULSIVE THERAPY: CPT | Performed by: PSYCHIATRY & NEUROLOGY

## 2025-03-12 PROCEDURE — 250N000011 HC RX IP 250 OP 636: Performed by: PSYCHIATRY & NEUROLOGY

## 2025-03-12 PROCEDURE — 250N000011 HC RX IP 250 OP 636: Performed by: STUDENT IN AN ORGANIZED HEALTH CARE EDUCATION/TRAINING PROGRAM

## 2025-03-12 RX ORDER — NALOXONE HYDROCHLORIDE 0.4 MG/ML
0.1 INJECTION, SOLUTION INTRAMUSCULAR; INTRAVENOUS; SUBCUTANEOUS
Status: CANCELLED | OUTPATIENT
Start: 2025-03-12

## 2025-03-12 RX ORDER — METHOHEXITAL IN WATER/PF 100MG/10ML
SYRINGE (ML) INTRAVENOUS PRN
Status: DISCONTINUED | OUTPATIENT
Start: 2025-03-12 | End: 2025-03-12

## 2025-03-12 RX ORDER — ONDANSETRON 2 MG/ML
4 INJECTION INTRAMUSCULAR; INTRAVENOUS EVERY 30 MIN PRN
Status: CANCELLED | OUTPATIENT
Start: 2025-03-12

## 2025-03-12 RX ORDER — NICARDIPINE HCL-0.9% SOD CHLOR 1 MG/10 ML
SYRINGE (ML) INTRAVENOUS PRN
Status: DISCONTINUED | OUTPATIENT
Start: 2025-03-12 | End: 2025-03-12

## 2025-03-12 RX ORDER — LABETALOL HYDROCHLORIDE 5 MG/ML
INJECTION, SOLUTION INTRAVENOUS PRN
Status: DISCONTINUED | OUTPATIENT
Start: 2025-03-12 | End: 2025-03-12

## 2025-03-12 RX ORDER — ONDANSETRON 4 MG/1
4 TABLET, ORALLY DISINTEGRATING ORAL EVERY 30 MIN PRN
Status: CANCELLED | OUTPATIENT
Start: 2025-03-12

## 2025-03-12 RX ORDER — DEXAMETHASONE SODIUM PHOSPHATE 4 MG/ML
4 INJECTION, SOLUTION INTRA-ARTICULAR; INTRALESIONAL; INTRAMUSCULAR; INTRAVENOUS; SOFT TISSUE
Status: CANCELLED | OUTPATIENT
Start: 2025-03-12

## 2025-03-12 RX ORDER — ESMOLOL HYDROCHLORIDE 10 MG/ML
INJECTION INTRAVENOUS PRN
Status: DISCONTINUED | OUTPATIENT
Start: 2025-03-12 | End: 2025-03-12

## 2025-03-12 RX ORDER — KETOROLAC TROMETHAMINE 30 MG/ML
30 INJECTION, SOLUTION INTRAMUSCULAR; INTRAVENOUS ONCE
Start: 2025-03-12 | End: 2025-03-12

## 2025-03-12 RX ORDER — SODIUM CHLORIDE, SODIUM LACTATE, POTASSIUM CHLORIDE, CALCIUM CHLORIDE 600; 310; 30; 20 MG/100ML; MG/100ML; MG/100ML; MG/100ML
INJECTION, SOLUTION INTRAVENOUS CONTINUOUS
Status: CANCELLED | OUTPATIENT
Start: 2025-03-12

## 2025-03-12 RX ORDER — ACETAMINOPHEN 325 MG/1
975 TABLET ORAL ONCE
Status: CANCELLED | OUTPATIENT
Start: 2025-03-12 | End: 2025-03-12

## 2025-03-12 RX ORDER — KETOROLAC TROMETHAMINE 30 MG/ML
30 INJECTION, SOLUTION INTRAMUSCULAR; INTRAVENOUS ONCE
Status: COMPLETED | OUTPATIENT
Start: 2025-03-12 | End: 2025-03-12

## 2025-03-12 RX ADMIN — ESMOLOL HYDROCHLORIDE 100 MG: 10 INJECTION, SOLUTION INTRAVENOUS at 11:58

## 2025-03-12 RX ADMIN — LABETALOL HYDROCHLORIDE 20 MG: 5 INJECTION, SOLUTION INTRAVENOUS at 11:58

## 2025-03-12 RX ADMIN — Medication 100 MG: at 11:58

## 2025-03-12 RX ADMIN — Medication 1000 MCG: at 11:58

## 2025-03-12 RX ADMIN — SUCCINYLCHOLINE CHLORIDE 90 MG: 20 INJECTION, SOLUTION INTRAMUSCULAR; INTRAVENOUS; PARENTERAL at 12:00

## 2025-03-12 RX ADMIN — KETOROLAC TROMETHAMINE 30 MG: 30 INJECTION, SOLUTION INTRAMUSCULAR at 11:55

## 2025-03-12 ASSESSMENT — COPD QUESTIONNAIRES: COPD: 1

## 2025-03-12 NOTE — ANESTHESIA CARE TRANSFER NOTE
Patient: Randi Cleary    Procedure: * Electroconvulsive Therapy    Diagnosis: * No pre-op diagnosis entered *  Diagnosis Additional Information: No value filed.    Anesthesia Type:   General     Note:    Oropharynx: oropharynx clear of all foreign objects and spontaneously breathing  Level of Consciousness: drowsy  Oxygen Supplementation: room air    Independent Airway: airway patency satisfactory and stable  Dentition: dentition unchanged  Vital Signs Stable: post-procedure vital signs reviewed and stable  Report to RN Given: handoff report given  Patient transferred to: PACU    Handoff Report: Identifed the Patient, Identified the Reponsible Provider, Reviewed the pertinent medical history, Discussed the surgical course, Reviewed Intra-OP anesthesia mangement and issues during anesthesia, Set expectations for post-procedure period and Allowed opportunity for questions and acknowledgement of understanding      Vitals:  Vitals Value Taken Time   /67 03/12/25 1213   Temp 36.7  C (98.1  F) 03/12/25 1211   Pulse 80 03/12/25 1219   Resp 16 03/12/25 1219   SpO2 93 % 03/12/25 1219   Vitals shown include unfiled device data.    Electronically Signed By: Thea Ford MD  March 12, 2025  12:19 PM

## 2025-03-12 NOTE — ANESTHESIA POSTPROCEDURE EVALUATION
Patient: Randi Cleary    Procedure:   Electroconvulsive Therapy    Anesthesia Type:  General    Note:     Postop Pain Control: Uneventful            Sign Out: Well controlled pain   PONV: No   Neuro/Psych: Uneventful            Sign Out: Acceptable/Baseline neuro status   Airway/Respiratory: Uneventful            Sign Out: Acceptable/Baseline resp. status   CV/Hemodynamics: Uneventful            Sign Out: Acceptable CV status; No obvious hypovolemia; No obvious fluid overload   Other NRE: NONE   DID A NON-ROUTINE EVENT OCCUR? No           Last vitals:  Vitals:    03/12/25 1213 03/12/25 1231 03/12/25 1237   BP: (!) 148/116 116/58 104/82   Pulse:  77 79   Resp:  19 16   Temp: 36.7  C (98  F)  36.6  C (97.8  F)   SpO2:  94% 93%       Electronically Signed By: Thea Ford MD  March 12, 2025  1:42 PM

## 2025-03-12 NOTE — DISCHARGE INSTRUCTIONS
ECT Discharge Instructions      During your ECT series:    Do not drive or work heavy equipment until 7 days after your last treatment.  Do not drink alcohol or use street drugs (illicit drugs) while you are having treatments.  Do not make important decisions, including legal decisions.    After your maintenance ECT treatment:    Do not drive for 24 hours after treatment.  If you will be having more than one treatment witihin a week, no driving until 7 days after your last ECT treatment.       After each treatment:    Get plenty of rest. A responsible adult must stay with your for at least 6 hours.  Avoid heavy or risky activities for 24 hours.  If you feel light-headed, sit for a few minutes before standing. Have someone help you get up.  If you have nausea (feel sick to your stomach): Drink only clear liquids such as apple juice, ginger ale, broth or 7UP, Be sure to drink plenty of liquids. Move to a normal diet as you feel able.   If you received Toradol, wait 6 hours before taking ibuprofen.  Call your doctor if:   You have a fever over 100F (37.7 C) (taken under the tongue), or a fever that last more than 24 hours.  Your IV site is red, swollen, very painful or is getting more tender.  You have nausea that gets very bad or does not improve.    If you have any symptoms after ECT, tell our staff before your next treatment.  The ECT Department can be reached at [phone].  The ECT Department is open Mondays, Wednesdays and Fridays from 7:00 AM to 2:00 PM.    To speak to a doctor, call: Dr. Reva Hutchison, Dr. Toan Cotter: Office [phone], Fax [phone]    Today you received the following:   - Toradol 30mg today at 1155am.  Toradol is an NSAID.  Do not take any NSAID's including: Ibuprofen, Naproxen, Aspirin, Advil, Motrin, Aleve, Shannan, etc., until 6 hours after the above time (082p).  If you have any questions, contact your physician or pharmacist.      Transported by: shira Little,  [phone]    Discharge Instructions reviewed with shira Little.    Instructions for your next appointment:    *Covid-19 Testing:  You are no longer required to covid test for your ECT procedure.    Please come to the [facility] entrance and check-in with Security before your ECT appointment.  The [facility] entrance is located right next to the Adult Emergency Room.  The address is [address] [place] [address].    After your appointment, you may be picked up at the [facility] entrance, which is located on the West side of our campus.  The address is [address] [place] [address].

## 2025-03-12 NOTE — ANESTHESIA PREPROCEDURE EVALUATION
Anesthesia Pre-Procedure Evaluation    Patient: Randi Cleary   MRN: [id number] : 1955        Procedure :   Electroconvulsive Therapy       Past Medical History:   Diagnosis Date     Bipolar 2 disorder (H)      Breast cancer (H)     lumpectomy, radiation, chemo     Chronic pain syndrome      COPD (chronic obstructive pulmonary disease) (H)     asthma     Cord compression (H) 2021     Dizzy      Drug tolerance     opioid     Esophageal reflux      Fatigue      Generalized anxiety disorder      Graves disease      Hemochromatosis 2018    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; H63D not detected     History of corticosteroid therapy 2019     History of partial adrenalectomy 2019     Hx antineoplastic chemotherapy      Hx of radiation therapy      Hyperlipidaemia      Hypertension      Impaired fasting glucose      Infection due to 2019 novel coronavirus 2021     Injury of neck, whiplash 07/15/2021     Joint pain      KYAW (obstructive sleep apnea) 2016     Osteopenia      Paroxysmal atrial fibrillation (H) 2024     Pheochromocytoma, left 2017    laparoscopically removed     Postablative hypothyroidism 1995     Prediabetes 10/03/2019    by A1c     Psoriasis      Psoriatic arthropathy (H)      Pulmonary hypertension (H)      Right rotator cuff tear      RLS (restless legs syndrome)     on ropinorole     Sacroiliitis      Serotonin syndrome 2020    [facility] - While on desvenlafaxine 100mg     Snoring      Spinal stenosis      Status post coronary angiogram 10/03/2019     Urinary incontinence      Vitamin B 12 deficiency 2009     Vitamin D deficiency       Past Surgical History:   Procedure Laterality Date     ARTHRODESIS ANKLE       ARTHROPLASTY ANKLE Right 2015    Procedure: ARTHROPLASTY ANKLE;  Surgeon: Jason Coughlin MD;  Location: [facility]     ARTHROPLASTY REVISION ANKLE Right 2015    Procedure: ARTHROPLASTY REVISION ANKLE;  Surgeon: Ced  Jason CORNEJO MD;  Location: [facility]     BIOPSY BREAST       BREAST BIOPSY, CORE RT/LT       COLONOSCOPY       COLONOSCOPY N/A 2/25/2021    Procedure: COLONOSCOPY;  Surgeon: Guru Elke Tolbert MD;  Location:  GI     CV CORONARY ANGIOGRAM N/A 10/3/2019    Procedure: CV CORONARY ANGIOGRAM;  Surgeon: Bryce Pierre MD;  Location:  HEART CARDIAC CATH LAB     CV RIGHT HEART CATH MEASUREMENTS RECORDED N/A 10/3/2019    Procedure: CV RIGHT HEART CATH;  Surgeon: Bryce Pierre MD;  Location:  HEART CARDIAC CATH LAB     CV RIGHT HEART CATH MEASUREMENTS RECORDED N/A 9/25/2024    Procedure: Heart Cath Right Heart Cath;  Surgeon: George Becker MD;  Location:  HEART CARDIAC CATH LAB     ESOPHAGOSCOPY, GASTROSCOPY, DUODENOSCOPY (EGD), COMBINED N/A 2/25/2021    Procedure: ESOPHAGOGASTRODUODENOSCOPY, WITH BIOPSY;  Surgeon: Guru Elke Tolbert MD;  Location:  GI     EYE SURGERY  2021     HC REMOVE TONSILS/ADENOIDS,<13 Y/O      Description: Tonsillectomy With Adenoidectomy;  Recorded: 04/07/2010;     IR LUMBAR EPIDURAL STEROID INJECTION  10/26/2004     IR LUMBAR EPIDURAL STEROID INJECTION  11/16/2004     IR LUMBAR EPIDURAL STEROID INJECTION  12/21/2004     IR LUMBAR EPIDURAL STEROID INJECTION  6/8/2006     JOINT REPLACEMENT       LAMINOPLASTY CERVICAL POSTERIOR THREE+ LEVELS Left 12/21/2021    Procedure: CERVICAL 3-CERVICAL 6 LEFT OPEN DOOR LAMINOPLASTY AND LEFT CERVICAL 4-5 AND CERVICAL 6-7 POSTERIOR FORAMINOTOMY;  Surgeon: Angela Gregory MD;  Location: [facility]     LAPAROSCOPIC ADRENALECTOMY Left 08/02/2017    pheochromocytoma     LAPAROSCOPIC ADRENALECTOMY Left 8/2/2017    Procedure: LAPAROSCOPIC LEFT ADRENALECTOMY, ;  Surgeon: Gab Linares MD;  Location: [facility];  Service:      LENGTHEN TENDON ACHILLES Right 6/29/2015    Procedure: LENGTHEN TENDON ACHILLES;  Surgeon: Jason Coughlin MD;  Location: [facility]     LUMPECTOMY BREAST        "LUMPECTOMY BREAST Left 1994     MAMMOPLASTY REDUCTION Right 2015    [place]     MAMMOPLASTY REDUCTION Right     approx late /     MASTECTOMY      left lumpectomy with axillary node dissection     MASTECTOMY MODIFIED RADICAL       OTHER SURGICAL HISTORY Right     reconstructive breast surgery     OTHER SURGICAL HISTORY      Adrenalectomy for pheochromocytoma     [place] MASTECTOMY, MODIFIED RADICAL      Description: Modified Radical Mastectomy Left Breast;  Recorded: 2010;     REPAIR HAMMER TOE Right 2015    Procedure: REPAIR HAMMER TOE;  Surgeon: Jason Coughlin MD;  Location: [facility]     TONSILLECTOMY       TONSILLECTOMY & ADENOIDECTOMY       [facility] ARTHRODESIS,ANKLE,OPEN Right     Description: Ankle Arthrodesis;  Recorded: 2010;      Allergies   Allergen Reactions     Serotonin Reuptake Inhibitors (Ssris) Anxiety, Difficulty breathing, Headache, Palpitations and Shortness Of Breath     Buspirone      The patient states she had serotonin syndrome     Cephalexin      Other reaction(s): unknown rxn.     Desvenlafaxine      Serotonin syndrome     Diclofenac Sodium [Diclofenac]      Serotonin syndrome and restless legs syndrome     Gabapentin      Drove on the wrong side of the highway     Levofloxacin      \"CAN'T REMEMBER\"     Penicillins      \"SORES IN MOUTH\"     Riluzole Difficulty breathing and Swelling     Sulfa Antibiotics      Chronic cough in sulfa containing diuretic      Topiramate Other (See Comments)     Frequent urination     Dextromethorphan Anxiety      Social History     Tobacco Use     Smoking status: Former     Current packs/day: 0.00     Average packs/day: 2.5 packs/day for 29.2 years (72.9 ttl pk-yrs)     Types: Cigarettes     Start date: 1971     Quit date: 2000     Years since quittin.7     Passive exposure: Current     Smokeless tobacco: Never   Substance Use Topics     Alcohol use: Not Currently     Comment: relapse 2021 sober       Wt Readings from " Last 1 Encounters:   03/11/25 79.8 kg (176 lb)        Anesthesia Evaluation   Pt has had prior anesthetic.     History of anesthetic complications       ROS/MED HX  ENT/Pulmonary:     (+) sleep apnea,                         COPD,              Neurologic:       Cardiovascular: Comment: pAfib & pHTN per chart    (+)  hypertension- -   -  - -                                pulmonary hypertension, Previous cardiac testing   Echo: Date: 11/2024 Results:  Global and regional left ventricular function is normal with an EF of 55-60%.  Right ventricular function, chamber size, wall motion, and thickness are normal.  No significant valvular abnormalities were noted.  This study was compared with the study from 10/5/23. No significant changes noted.  Stress Test:  Date: Results:    ECG Reviewed:  Date: Results:    Cath:  Date: Results:      METS/Exercise Tolerance:     Hematologic: Comments: Hemochromatosis       Musculoskeletal: Comment: Osteopenia  Sacral joint pain  Psoriatic arthritis  (+)  arthritis,             GI/Hepatic:     (+) GERD,     hiatal hernia,              Renal/Genitourinary:       Endo:     (+)          thyroid problem,     Obesity,       Psychiatric/Substance Use:     (+) psychiatric history bipolar       Infectious Disease:       Malignancy: Comment: Breast cancer      Other:            Physical Exam    Airway  airway exam normal      Mallampati: II   TM distance: > 3 FB   Neck ROM: full   Mouth opening: > 3 cm    Respiratory Devices and Support         Dental       (+) Modest Abnormalities - crowns, retainers, 1 or 2 missing teeth      Cardiovascular   cardiovascular exam normal          Pulmonary   pulmonary exam normal              OUTSIDE LABS:  CBC:   Lab Results   Component Value Date    WBC 5.8 02/25/2025    WBC 6.9 12/19/2024    HGB 16.8 (H) 02/25/2025    HGB 17.8 (H) 12/19/2024    HCT 48.7 (H) 02/25/2025    HCT 51.1 (H) 12/19/2024     02/25/2025     12/19/2024     BMP:   Lab  Results   Component Value Date     02/25/2025     12/19/2024    POTASSIUM 4.5 02/25/2025    POTASSIUM 4.9 12/19/2024    CHLORIDE 104 02/25/2025    CHLORIDE 103 12/19/2024    CO2 27 02/25/2025    CO2 27 12/19/2024    BUN 15.4 02/25/2025    BUN 14.2 12/19/2024    CR 0.58 02/25/2025    CR 0.65 12/19/2024    GLC 76 02/25/2025    GLC 91 12/19/2024     COAGS:   Lab Results   Component Value Date    PTT 34 12/13/2021    INR 0.99 11/23/2024     POC:   Lab Results   Component Value Date     (H) 02/25/2021     HEPATIC:   Lab Results   Component Value Date    ALBUMIN 4.3 02/25/2025    PROTTOTAL 7.2 02/25/2025    ALT 17 02/25/2025    AST 23 02/25/2025    ALKPHOS 64 02/25/2025    BILITOTAL 0.4 02/25/2025     OTHER:   Lab Results   Component Value Date    A1C 5.4 02/12/2025    LINDA 9.4 02/25/2025    MAG 1.9 11/22/2024    TSH 0.50 02/12/2025    T4 1.49 02/12/2025    T3 114 01/18/2023    CRP <2.9 11/16/2021    SED 8 10/17/2023       Anesthesia Plan    ASA Status:  3    NPO Status:  NPO Appropriate    Anesthesia Type: General.     - Airway: Mask Only   Induction: Intravenous.           Consents    Anesthesia Plan(s) and associated risks, benefits, and realistic alternatives discussed. Questions answered and patient/representative(s) expressed understanding.     - Discussed:     - Discussed with:  Patient      - Extended Intubation/Ventilatory Support Discussed: No.      - Patient is DNR/DNI Status: No     Use of blood products discussed: No .     Postoperative Care    Pain management: Oral pain medications.   PONV prophylaxis: Ondansetron (or other 5HT-3)     Comments:               Thea Ford MD    I have reviewed the pertinent notes and labs in the chart from the past 30 days and (re)examined the patient.  Any updates or changes from those notes are reflected in this note.    Clinically Significant Risk Factors Present on Admission                             # Overweight: Estimated body mass index  "is 29.29 kg/m  as calculated from the following:    Height as of 3/11/25: 1.651 m (5' 5\").    Weight as of 3/11/25: 79.8 kg (176 lb).                "
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
## 2025-03-12 NOTE — PROGRESS NOTES
[facility]                                     Progress Note    Patient Name: Randi Cleary  Date: 3/12/2025        Service Type: Individual      Session Start Time: 9:30  Session End Time: 10:16     Session Length: 38-52    Session #: 21    Attendees: Client    Service Modality:  Video Visit:      Provider verified identity through the following two step process.  Patient provided:  Patient  and Patient address    Telemedicine Visit: The patient's condition can be safely assessed and treated via synchronous audio and visual telemedicine encounter.      Reason for Telemedicine Visit: Patient has requested telehealth visit    Originating Site (Patient Location): Patient's home    Distant Site (Provider Location): Provider Remote Setting- Home Office    Consent:  The patient/guardian has verbally consented to: the potential risks and benefits of telemedicine (video visit) versus in person care; bill my insurance or make self-payment for services provided; and responsibility for payment of non-covered services.     Patient would like the video invitation sent by:  My Chart    Mode of Communication:  Video Conference via [employer]well    Distant Location (Provider):  Off-site    As the provider I attest to compliance with applicable laws and regulations related to telemedicine.    DATA  Interactive Complexity: No  Crisis: No        Progress Since Last Session (Related to Symptoms / Goals / Homework):   Symptoms: No change      Homework:  n/a      Episode of Care Goals: Satisfactory progress - ACTION (Actively working towards change); Intervened by reinforcing change plan / affirming steps taken     Current / Ongoing Stressors and Concerns:   Working to find balance between staying informed and keeping boundaries regarding national news and political issues.  Pain in shoulders and restless legs are current concerns.       Treatment Objective(s) Addressed in This Session:   Distress  tolerance     Intervention:   Validation, safety assessment.  Encouragement.  Small steps toward larger goals.  Setting boundaries on intense experiences/information in order to promote self-care.  Discussion and psychoeducation about trauma.      Assessments completed prior to visit:  The following assessments were completed by patient for this visit:  PHQ9:       2/3/2025    11:13 AM 2/10/2025    11:46 AM 2/17/2025     9:49 AM 2/24/2025     9:43 AM 3/3/2025    10:28 AM 3/5/2025    11:44 AM 3/11/2025     8:36 PM   PHQ-9 SCORE   PHQ-9 Total Score MyChart 11 (Moderate depression) 15 (Moderately severe depression) 16 (Moderately severe depression) 15 (Moderately severe depression) 15 (Moderately severe depression) 15 (Moderately severe depression) 14 (Moderate depression)   PHQ-9 Total Score 11  15  16  15  15  15  14        Patient-reported     GAD7:       10/22/2024     8:59 AM 11/4/2024     2:38 PM 12/3/2024     9:10 AM 12/17/2024     9:13 AM 1/23/2025     8:47 AM 2/10/2025    10:22 PM 3/3/2025    10:45 AM   CHAVO-7 SCORE   Total Score 11 (moderate anxiety) 10 (moderate anxiety) 17 (severe anxiety) 13 (moderate anxiety) 11 (moderate anxiety) 14 (moderate anxiety) 16 (severe anxiety)   Total Score 11    11 10  17  13  11  14  16        Patient-reported     CAGE-AID:       6/22/2020     7:12 PM 1/18/2022    11:15 PM 6/6/2024     8:00 AM   CAGE-AID Total Score   Total Score 4 4 4   Total Score MyChart 4 (A total score of 2 or greater is considered clinically significant) 4 (A total score of 2 or greater is considered clinically significant)      PROMIS 10-Global Health (all questions and answers displayed):       10/30/2024     8:48 AM 11/6/2024    12:54 PM 11/14/2024    11:35 AM 11/26/2024     5:00 PM 12/4/2024     9:28 AM 12/18/2024     9:10 AM 12/30/2024     5:44 PM   PROMIS 10   In general, would you say your health is: Fair Fair Fair  Good Fair Fair   In general, would you say your quality of life is: Poor Poor  Poor  Poor Fair Poor   In general, how would you rate your physical health? Poor Good Fair  Good Fair Fair   In general, how would you rate your mental health, including your mood and your ability to think? Fair Fair Fair  Fair Fair Poor   In general, how would you rate your satisfaction with your social activities and relationships? Fair Poor Poor  Poor Poor Poor   In general, please rate how well you carry out your usual social activities and roles Poor Poor Fair  Poor Poor Poor   To what extent are you able to carry out your everyday physical activities such as walking, climbing stairs, carrying groceries, or moving a chair? Moderately Moderately Moderately  Moderately Moderately Moderately   In the past 7 days, how often have you been bothered by emotional problems such as feeling anxious, depressed, or irritable? Often Often Often  Often Often Often   In the past 7 days, how would you rate your fatigue on average? Moderate Moderate Moderate  Moderate Moderate Moderate   In the past 7 days, how would you rate your pain on average, where 0 means no pain, and 10 means worst imaginable pain? 7 7 7  8 7 8   In general, would you say your health is: 2 2 2  3 2 2   In general, would you say your quality of life is: 1 1 1  1 2 1   In general, how would you rate your physical health? 1 3 2  3 2 2   In general, how would you rate your mental health, including your mood and your ability to think? 2 2 2  2 2 1   In general, how would you rate your satisfaction with your social activities and relationships? 2 1 1  1 1 1   In general, please rate how well you carry out your usual social activities and roles. (This includes activities at home, at work and in your community, and responsibilities as a parent, child, spouse, employee, friend, etc.) 1 1 2  1 1 1   To what extent are you able to carry out your everyday physical activities such as walking, climbing stairs, carrying groceries, or moving a chair? 3 3 3  3 3 3   In  the past 7 days, how often have you been bothered by emotional problems such as feeling anxious, depressed, or irritable? 4 4 4  4 4 4   In the past 7 days, how would you rate your fatigue on average? 3 3 3  3 3 3   In the past 7 days, how would you rate your pain on average, where 0 means no pain, and 10 means worst imaginable pain? 7 7 7  8 7 8   Global Mental Health Score 7  6  6   6  7  5    Global Physical Health Score 9  11  10   11  10  10    PROMIS TOTAL - SUBSCORES 16  17  16   17  17  15        Information is confidential and restricted. Go to Review Flowsheets to unlock data.    Patient-reported     Miner Suicide Severity Rating Scale (Lifetime/Recent)      1/9/2023     1:00 PM 5/21/2024     4:49 PM 5/21/2024     9:00 PM 6/6/2024     8:00 AM 7/10/2024    12:00 PM 11/22/2024    11:05 AM 2/26/2025     5:59 PM   Miner Suicide Severity Rating (Lifetime/Recent)   Q1 Wish to be Dead (Lifetime)   Yes       Comments   OD on medications       Q2 Non-Specific Active Suicidal Thoughts (Lifetime)   No       Most Severe Ideation Rating (Lifetime)   5       Most Severe Ideation Description (Lifetime)   OD on medication       Frequency (Lifetime)   3       Duration (Lifetime)   3       Controllability (Lifetime)   2       Protective Factors  (Lifetime)   4       Reasons for Ideation (Lifetime)   5       Q1 Wished to be Dead (Past Month) yes 1-->yes 1-->yes 1-->yes  0-->no 0-->no   Q2 Suicidal Thoughts (Past Month) no 1-->yes 1-->yes 1-->yes  0-->no 0-->no   Q3 Suicidal Thought Method no 0-->no 0-->no 1-->yes      Q4 Suicidal Intent without Specific Plan no 1-->yes 0-->no 0-->no      Q5 Suicide Intent with Specific Plan no 0-->no 0-->no 1-->yes      Q6 Suicide Behavior (Lifetime) yes 1-->yes 0-->no 1-->yes  0-->no 0-->no   If yes to Q6, within past 3 months? no 1-->yes 0-->no 0-->no      Level of Risk per Screen moderate risk high risk moderate risk high risk  no risks indicated no risks indicated   1. Wish to be  Dead (Lifetime)     N     2. Non-Specific Active Suicidal Thoughts (Lifetime)     N           ASSESSMENT: Current Emotional / Mental Status (status of significant symptoms):   Risk status (Self / Other harm or suicidal ideation)   Patient denies current fears or concerns for personal safety.   Patient denies current or recent suicidal ideation or behaviors.   Patient denies current or recent homicidal ideation or behaviors.   Patient denies current or recent self injurious behavior or ideation.   Patient denies other safety concerns.   Patient reports there has been no change in risk factors since their last session.     Patient reports there has been no change in protective factors since their last session.     A safety and risk management plan has been developed including: Patient consented to co-developed safety plan on 6/4/24.  Safety and risk management plan was reviewed.   Patient agreed to use safety plan should any safety concerns arise.  A copy was made available to the patient.     Appearance:   Appropriate    Eye Contact:   Good    Psychomotor Behavior: Normal    Attitude:   Friendly Pleasant   Orientation:   All   Speech    Rate / Production: Slow     Volume:  Normal    Mood:    Anxious    Affect:    Subdued    Thought Content:  Clear    Thought Form:  Goal Directed    Insight:    Good      Medication Review:   No changes to current psychiatric medication(s)     Medication Compliance:   Yes     Changes in Health Issues:   None reported     Chemical Use Review:   Substance Use: Chemical use reviewed, no active concerns identified      Tobacco Use: No current tobacco use.      Diagnosis:  1. 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 Generalized anxiety disorder        Collateral Reports Completed:   Not Applicable    PLAN: (Patient Tasks / Therapist Tasks / Other)  Continue following safety plan.  Continue to monitor exposure to news and political information.  Find  ways to proactively  be involved in organizations or events that are important to her.  Continue discussion of brainspotting.      Erika Colorado, LMFT                                                         ______________________________________________________________________    Individual Treatment Plan    Patient's Name: Randi Cleary  YOB: 1955    Date of Creation: 7/17/2024  Date Treatment Plan Last Reviewed/Revised: 7/17/2024, 10/30/24, 2/3/2025    DSM5 Diagnoses:   296.33 (F33.2) Major Depressive Disorder, Recurrent Episode, Severe   300.02 (F41.1) Generalized Anxiety Disorder  Psychosocial / Contextual Factors: history of trauma  PROMIS (reviewed every 90 days):     Referral / Collaboration:  Discussed and patient will pursue a therapy group for depressive symptoms.    Anticipated number of session for this episode of care: 9-12 sessions  Anticipation frequency of session: Weekly  Anticipated Duration of each session: 38-52 minutes  Treatment plan will be reviewed in 90 days or when goals have been changed.       MeasurableTreatment Goal(s) related to diagnosis / functional impairment(s)  Goal 1: Client will keep self safe and eliminate suicidal ideation and self-harm behaviors.    Objective #A (Client Action)    Client will make a list of at least 10 skills or activities that you will to use to distract from urges to harm self.  Status: Completed - Date: 10/30/24       Intervention(s)  Therapist will provide ideas and strategies for generating coping skills list.    Objective #B  Client will make a list of pros and cons for tolerating and not tolerating an urge to harm self.  Status: Continued - Date(s): 2/3/2025    Intervention(s)  Therapist will guide client through DBT-style Pros/Cons list for behavior change.    Objective #C  Client will identify and practice the new strategies for dealing with strong emotions, learn and practice relaxation breathing.  Status: Continued -  Date(s): 3/12/25      Intervention(s)  Therapist will teach distraction skills. Practice them within session and outside of session.    Goal 2: Client will report reduction in anxiety also as evidenced by reduction of CHAVO-7 score below 6 points within the next 12 weeks.    Objective #A (Client Action)    Client will identify at least three triggers for anxiety.  Status: Completed - Date: 10/30/24       Intervention(s)  Therapist will provide educational materials on common triggers and signs of anxiety.    Objective #B  Client will use relaxation strategies at least two times per day to reduce the physical symptoms of anxiety.  Status: Continued - Date(s): 2/3/2025    Intervention(s)  Therapist will teach relaxation strategies such as mindfulness, deep breathing, muscle relaxation, and sensory activities.    Objective #C  Client will use cognitive strategies identified in therapy to challenge anxious thoughts.  Status: Continued - Date(s): 2/3/2025    Intervention(s)  Therapist will teach strategies for cognitive modification using REBT model.    Goal 3: Client will report improved mood as indicated by PHQ-9 score below 6 for consistent 8 weeks.    Objective #A (Client Action)    Status: Continued - Date: 2/3/2025    Client will Increase interest, engagement, and pleasure in doing things.    Intervention(s)  Therapist will assign homework for daily involvement in pleasant activities.    Objective #B  Client will Identify negative self-talk and behaviors: challenge core beliefs, myths, and actions.    Status: Continued - Date(s): 2/3/2025    Intervention(s)  Therapist will teach strategies for cognitive modification using REBT models.    Objective #C  Client will Improve quantity and quality of night time sleep / decrease daytime naps.  Status: Continued - Date(s): 2/3/2025    Intervention(s)  Therapist will teach sleep hygiene strategies.  Assign homework for daily practice.    Goal 4: Client will report reduced or  eliminated physiological activation when recalling a distressing event including decreased re-experiencing, decreased avoidance, and decreased hyperarousal.    Objective #A (Client Action)    Status:  Continued: 2/3/25       Client will acquire knowledge of trauma, common symptoms post-trauma, emotion regulation strategies, stress management strategies, and cognitive coping strategies.    Intervention(s)  Therapist will teach about effects of trauma on mind and body; encourage emotion identification and expression; practice with client the stress management strategies; and teach cognitive modification.    Objective #B  Client will use Brainspotting while focusing on physiological sensations related to distressing events.  Client will assess and rate level of physiological activation.  Status: Continued - Date(s): 2/3/2025    Intervention(s)  Therapist will provide use a pointer or other materials to assist client in holding a brainspot in their visual field.  Therapist might also provide biolateral music through headphones to deepen the experience.         Patient has reviewed and agreed to the above plan.      Erika Colorado, LMFT  July 17, 2024

## 2025-03-18 ENCOUNTER — MYC MEDICAL ADVICE (OUTPATIENT)
Dept: PSYCHIATRY | Facility: CLINIC | Age: 70
End: 2025-03-18
Payer: MEDICARE

## 2025-03-18 ENCOUNTER — MYC REFILL (OUTPATIENT)
Dept: PSYCHIATRY | Facility: CLINIC | Age: 70
End: 2025-03-18
Payer: MEDICARE

## 2025-03-18 DIAGNOSIS — F41.1 GENERALIZED ANXIETY DISORDER: ICD-10-CM

## 2025-03-18 RX ORDER — GABAPENTIN 100 MG/1
CAPSULE ORAL
Qty: 120 CAPSULE | Refills: 2 | OUTPATIENT
Start: 2025-03-18

## 2025-03-18 NOTE — TELEPHONE ENCOUNTER
Pt has refills on file at pharmacy. Replied on mychart, letting pt know that writer had reached out to pharmacy and confirmed refills on file there.    - Ruperto Paulson, Visit Facilitator

## 2025-03-19 ENCOUNTER — MYC MEDICAL ADVICE (OUTPATIENT)
Dept: PSYCHIATRY | Facility: CLINIC | Age: 70
End: 2025-03-19
Payer: MEDICARE

## 2025-04-01 ENCOUNTER — ANESTHESIA EVENT (OUTPATIENT)
Dept: BEHAVIORAL HEALTH | Facility: CLINIC | Age: 70
End: 2025-04-01

## 2025-04-01 RX ORDER — ONDANSETRON 2 MG/ML
4 INJECTION INTRAMUSCULAR; INTRAVENOUS EVERY 30 MIN PRN
OUTPATIENT
Start: 2025-04-01

## 2025-04-01 RX ORDER — ONDANSETRON 4 MG/1
4 TABLET, ORALLY DISINTEGRATING ORAL EVERY 30 MIN PRN
OUTPATIENT
Start: 2025-04-01

## 2025-04-01 RX ORDER — NALOXONE HYDROCHLORIDE 0.4 MG/ML
0.1 INJECTION, SOLUTION INTRAMUSCULAR; INTRAVENOUS; SUBCUTANEOUS
OUTPATIENT
Start: 2025-04-01

## 2025-04-01 RX ORDER — DEXAMETHASONE SODIUM PHOSPHATE 4 MG/ML
4 INJECTION, SOLUTION INTRA-ARTICULAR; INTRALESIONAL; INTRAMUSCULAR; INTRAVENOUS; SOFT TISSUE
OUTPATIENT
Start: 2025-04-01

## 2025-04-01 ASSESSMENT — COPD QUESTIONNAIRES: COPD: 1
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
## 2025-04-02 ENCOUNTER — VIRTUAL VISIT (OUTPATIENT)
Dept: PSYCHOLOGY | Facility: CLINIC | Age: 70
End: 2025-04-02
Payer: MEDICARE

## 2025-04-02 ENCOUNTER — HOSPITAL ENCOUNTER (OUTPATIENT)
Dept: BEHAVIORAL HEALTH | Facility: CLINIC | Age: 70
Discharge: HOME | End: 2025-04-02
Attending: PSYCHIATRY & NEUROLOGY
Payer: MEDICARE

## 2025-04-02 ENCOUNTER — ANESTHESIA (OUTPATIENT)
Dept: BEHAVIORAL HEALTH | Facility: CLINIC | Age: 70
End: 2025-04-02
Payer: MEDICARE

## 2025-04-02 VITALS
HEART RATE: 80 BPM | SYSTOLIC BLOOD PRESSURE: 132 MMHG | RESPIRATION RATE: 16 BRPM | DIASTOLIC BLOOD PRESSURE: 96 MMHG | OXYGEN SATURATION: 95 % | TEMPERATURE: 97.5 F

## 2025-04-02 DIAGNOSIS — F33.2 MAJOR DEPRESSIVE DISORDER, RECURRENT SEVERE WITHOUT PSYCHOTIC FEATURES (H): Primary | ICD-10-CM

## 2025-04-02 DIAGNOSIS — F41.1 GENERALIZED ANXIETY DISORDER: ICD-10-CM

## 2025-04-02 DIAGNOSIS — F33.2 SEVERE RECURRENT MAJOR DEPRESSION WITHOUT PSYCHOTIC FEATURES (H): Primary | ICD-10-CM

## 2025-04-02 PROCEDURE — 90870 ELECTROCONVULSIVE THERAPY: CPT | Performed by: STUDENT IN AN ORGANIZED HEALTH CARE EDUCATION/TRAINING PROGRAM

## 2025-04-02 PROCEDURE — 370N000017 HC ANESTHESIA TECHNICAL FEE, PER MIN: Performed by: ANESTHESIOLOGY

## 2025-04-02 PROCEDURE — 90870 ELECTROCONVULSIVE THERAPY: CPT

## 2025-04-02 PROCEDURE — 250N000011 HC RX IP 250 OP 636: Mod: JZ | Performed by: PSYCHIATRY & NEUROLOGY

## 2025-04-02 PROCEDURE — 90837 PSYTX W PT 60 MINUTES: CPT | Mod: 95 | Performed by: MARRIAGE & FAMILY THERAPIST

## 2025-04-02 RX ORDER — METHOHEXITAL IN WATER/PF 100MG/10ML
SYRINGE (ML) INTRAVENOUS PRN
Status: DISCONTINUED | OUTPATIENT
Start: 2025-04-02 | End: 2025-04-02

## 2025-04-02 RX ORDER — KETOROLAC TROMETHAMINE 30 MG/ML
30 INJECTION, SOLUTION INTRAMUSCULAR; INTRAVENOUS ONCE
Status: COMPLETED | OUTPATIENT
Start: 2025-04-02 | End: 2025-04-02

## 2025-04-02 RX ORDER — LABETALOL HYDROCHLORIDE 5 MG/ML
INJECTION, SOLUTION INTRAVENOUS PRN
Status: DISCONTINUED | OUTPATIENT
Start: 2025-04-02 | End: 2025-04-02

## 2025-04-02 RX ORDER — KETOROLAC TROMETHAMINE 30 MG/ML
30 INJECTION, SOLUTION INTRAMUSCULAR; INTRAVENOUS ONCE
Start: 2025-04-02 | End: 2025-04-02

## 2025-04-02 RX ADMIN — LABETALOL HYDROCHLORIDE 15 MG: 5 INJECTION, SOLUTION INTRAVENOUS at 13:08

## 2025-04-02 RX ADMIN — Medication 100 MG: at 13:07

## 2025-04-02 RX ADMIN — KETOROLAC TROMETHAMINE 30 MG: 30 INJECTION, SOLUTION INTRAMUSCULAR at 12:48

## 2025-04-02 NOTE — ANESTHESIA CARE TRANSFER NOTE
Patient: Randi Cleary    Procedure: * No procedures listed *  Electroconvulsive Therapy    Diagnosis: * No pre-op diagnosis entered *  Diagnosis Additional Information: No value filed.    Anesthesia Type:   General     Note:    Oropharynx: oropharynx clear of all foreign objects  Level of Consciousness: drowsy  Oxygen Supplementation: room air    Independent Airway: airway patency satisfactory and stable  Dentition: dentition unchanged  Vital Signs Stable: post-procedure vital signs reviewed and stable  Report to RN Given: handoff report given  Patient transferred to: Phase II    Handoff Report: Identifed the Patient, Identified the Reponsible Provider, Reviewed the pertinent medical history, Discussed the surgical course, Reviewed Intra-OP anesthesia mangement and issues during anesthesia, Set expectations for post-procedure period and Allowed opportunity for questions and acknowledgement of understanding  Vitals:  Vitals Value Taken Time   BP     Temp     Pulse     Resp     SpO2         Electronically Signed By: Ivy Mejia MD  April 2, 2025  1:19 PM

## 2025-04-02 NOTE — ANESTHESIA POSTPROCEDURE EVALUATION
Patient: Randi Cleary    Procedure: * No procedures listed *  Electroconvulsive Therapy    Anesthesia Type:  General    Note:  Disposition: Outpatient   Postop Pain Control: Uneventful            Sign Out: Well controlled pain   PONV: No   Neuro/Psych: Uneventful            Sign Out: Acceptable/Baseline neuro status   Airway/Respiratory: Uneventful            Sign Out: Acceptable/Baseline resp. status   CV/Hemodynamics: Uneventful            Sign Out: Acceptable CV status; No obvious hypovolemia; No obvious fluid overload   Other NRE: NONE   DID A NON-ROUTINE EVENT OCCUR? No       Last vitals:  Vitals:    04/02/25 1225 04/02/25 1315 04/02/25 1330   BP: 128/81 127/66 100/52   Pulse: 85 57 87   Resp: 16 15 18   Temp: 36.5  C (97.7  F) (!) 35.9  C (96.6  F) 36.3  C (97.4  F)   SpO2: 93% 96% 95%       Electronically Signed By: Ivy Mejia MD  April 2, 2025  1:35 PM

## 2025-04-02 NOTE — PROGRESS NOTES
Pt has met discharge criteria.  VSS.  PIV removed without any issue.  Pt assisted into wheelchair and psych associate took the pt to her  for discharge home.

## 2025-04-02 NOTE — PROCEDURES
"  [facility], [place]   ECT Procedure Note   04/02/2025    Randi Cleary is a 69 year old female patient.  [id number]    Patient Status: outpatient    Is this the first in a series of 12 treatments?  No      Allergies   Allergen Reactions    Serotonin Reuptake Inhibitors (Ssris) Anxiety, Difficulty breathing, Headache, Palpitations and Shortness Of Breath    Buspirone      The patient states she had serotonin syndrome    Cephalexin      Other reaction(s): unknown rxn.    Desvenlafaxine      Serotonin syndrome    Diclofenac Sodium [Diclofenac]      Serotonin syndrome and restless legs syndrome    Gabapentin      Drove on the wrong side of the highway    Levofloxacin      \"CAN'T REMEMBER\"    Penicillins      \"SORES IN MOUTH\"    Riluzole Difficulty breathing and Swelling    Sulfa Antibiotics      Chronic cough in sulfa containing diuretic     Topiramate Other (See Comments)     Frequent urination    Dextromethorphan Anxiety       Weight:  0 lbs 0 oz / 0 kg          Indications for ECT:   Medications ineffective and Psychotherapies ineffective         Clinical Narrative:   HPI - The patient describes a lifelong history of depression dating back to elementary school, worsening after her father's death when she was 15yo and especially in the 1980s in the setting of worsening physical health (since falling and breaking her ankle), which led to the the initiation of fluoxetine, her first antidepressant.  Her history has been primarily characterized by low mood, with some ups and downs but no extended depression-free periods since then.  She has tried many different medications from different classes, but cannot recall any one being particularly helpful for her.  She has experienced past episodes of particularly irritable mood and concomitant decreased sleep need, although most of these have lasted 1-2 days and triggered by anger related to external stressors.  She has at least one episode " "of a more extended episode of elevated mood (and associated grandiosity, increased spending, flight of ideas, increased goal directed behaviors, pressured speech, and odd and embarrassing behavior), which occurred in the context of relapse on alcohol in 2021. She does not endorse any events before about age 48.     The patient's depression is characterized by near daily low mood, frequent anhedonia, with sleep difficulties (although c/b pain, KYAW, and RLS), fatigue, feelings of guilt/worthlessness, and impaired concentration.  She reports feelings of \"despair,\" at times with active SI with plan, but denies any intent to act on this - \"maybe it's hope.\"  She has 1 lifetime suicide attempt (overdose) in the 1980s, for which she was psychiatrically hospitalized.  She has a remote history of SIB (cutting) but not recently.       Psych pertinent item history includes includes suicide attempt , suicidal ideation, SIB , aggression, trauma hx, substance use: alcohol, cannabis, and Patient has a history of alcohol dependence treated 20+ years ago and she relapsed in 2020 for a couple of months, in her 20s she\" loved getting high\" on cocaine, LSD, mushrooms, speed, white cross, mutiple psychotropic trials , psych hosp, ketamine, and major medical problems.    Target Symptoms for Improvement: Amotivation, Fatigue, Improved self-image and Panic attacks         Diagnosis:   Major depression         Assessment:   #1 05/24/24 Some circumstantial thought, needs some redirection, 3.5 hours sleep last night, anxious, mood 1/10, PDW but no SI, never had ECT before - only TMS. No AVH.   #2 05/29/24  Mood 4/10, better over w/e, some word find difficulties, no AVH/SI/PDW. Chronic sciatica pain, neck and shoulder pain - didn't worsen with ECT. Mild headache.   #3 05/31/24  Mood 4/10, No SI, PDW, AVH, some wrist pain and headache, memory might be improving.    #4 6/3/24  Phq-9= 13, mood 3/10.  Yesterday was very tearful, still a bit today. " " Last treatment had awareness under paralysis.  Otherwise, some initial confusion after first ECT but no subsequent cognitive effects.  Signed consent to continue.  #5 6/5/24 Mood 4-5/10  She feels like her \"rage\" is less and she feels lighter. but no cognitive side effects. She complains of excessive sweating and is concerned her thryoid is unbalanced. She is somatically focused She reports pain on the side of her neck radiating down to her chest. She had a migraine she thinks over the weekend which usual starts as occipital tension.  #6 6/7/24 mood 4-5/10. No SI. She does have some baseline long term memory difficulties no AVH.   #7 6/10/24  She still is worried that She is not able to go home. She had visitors this weekend who said \"you don't seem to have the weight of the world on your shoulders anymore\" she disagrees she had a downward spiral over the weekend when there was a mix up with her clothes and she usually feels tearful after ECT. She does not report anything feeling worse. She gets down on herself when she has an anxiety spiral and it was the 1st one she has had since admission.   #8 6/12/24 Winnie reported some tearfulness overnight. She is noticing a weight lifting mood 6/10 . Reports some difficulty with remembering names but believes this is at baseline.   #9 6/14/24 Mood 5/10 (10 best) She feels like she is improving each time . She still has occasional crying spells but she feels she bounces back quicker. She is still anxious about going home. No Si. Tolerating ECT  #10 06/17/24 mood 5/10 She does feellike she has gotten some improvement since starting Ect but still has times where she gets tearful and anxious \"goes down the rabit hole\" but not as often . She has had ketamine troches before with pain  management to no effect but wonders about ketamine infusions.  #11 06/19/24 Mood today is 5/10. Patient is wondering whether she could do a ketamine course (did have ketamine in the past), despite of " "being on opioids. Feels that ketamine can help with \"anger, tearing and anxiety.\" She complains to the anesthesiologist about recent urinary incontinence which needs to be considered when  using ketamine. She wants to try it for anger ,tearing and anxiety which is not a standard indication  #12 06/21/24 Mood 5/10, no PDW/SI, but some anxiety around pain this AM.  Patient is interested in maintenance ECT at her attending psychiatrist's recommendation to prevent the possibility of her mood dipping as low as it did previously that led to her hospitalization.  Discussed pros and cons of maintenance ECT, suggested alternative of maintenance medications/therapy and/or watchful waiting with possibility of retreatment should she relapse, as well as alternate interventions including ketamine.  At the moment, she is most interested in pursuing maintenance ECT - will plan for next Friday pending confirmation with her family that she has post-ECT ride and monitoring.  M1 06/28/24 Mood ups and downs, irritability, anxiety, sadness switching multiple times a day since being discharged home.  Had difficulty with the transition home, was harder than she thought it would be.  However, still able to \"push away\" PDW/SI, and did not have periods of persistently low mood this past week.  Has noticed some anterograde and retrograde amnesia of the 1-2 months prior to starting ECT.  Will continue to track.  Today, mood 6/10 \"now that I'm at ECT.\"  M2 07/05/24 She was tearful, states that she doesn't feel good, \"starting to drop\", states that the mood change happened in Wednesday somewhat suddenly, when she encountered several business stressors and felt overwhelmed. Mood 3-4/10. Denies SI and feels safe at home. Still reports memory issues. Reports that the day program has been overwhelming.    She cancelled her colonoscopy in order to focus on her right heart cath the next week. She feels like she has too many procedures scheduled and " pushed off her sleep study also.  M3 7/12/24 Doing well.  Somewhat stressed witht all the medical appointments she has to coordinate. Her colonoscopy got cancelled because of her upcoming appointment with cardiology. Canceled the outpatient program but interested in doing the group therapy at [facility].   M4 7/19/24 Doing well. Less frustrated and started therapy. Reports short term memory impairment. That said, she is hesitant to space ECT to [id number]  M5 8/2/24 Mood 5-6/10 she struggles some with the interval felling more irritable and tearful the second week. She felt some Si yesterday because she was frustrated and overehwlemd but no SI today. Cogntiively processing speed is affected and does better if she can get a hint. Encouraged her to take her viibryd as prescribed  M6 08/16/24  She is having headaches she attributes to the viibryd and encouraged her to adhere. She reports she still has lability between session lots of stressors with medical billing errors and feeling like she is hounded by collections mood 4/10. Tolerating Ect without complaints of cognitive side effects. Spent birthday in still water   M7 09/04/24  called earlier today with plan to cancel because she was feeling overwhelmed and had poor sleep the night before. Encouraged her to attend . She was very stressed because her electricity was out for mo than a week and her internet is out now . She still gets many incorrect medical bills which are very stressful  some trouble with naming songs on the radio  M8 09/13/24 Winnie is focused on her upcoming shoulder surgery and wants to make sure we talk about ECT and her recovery period. Will meet with surgeon in October and plan surgery in november. Mood is struggling because insomnia is still a problem despite low dose of trazodone    M9 09/27/24  she discontinued her viibryd as she attributes insomnia to that medication. Encourages her to start Auvelity which is nher new foundational antidepressant. She  "had a successful cath lab visit and went out to celebrate and ended up having a fall and hit her head and was worked-up in the ED.  She reports she was tearful yesterdya she is still having mood dips in between sessions so not comfortable spacing out until she is re-established on antidepressant  M10 10/11/24: Mood is doing relatively well but has had some difficulties recovering after the fall, difficulty breathing attributed to bruised rib.  Now has rash at site of COVID vaccine from Wed, warm and red c/f cellulitis.  Trying to start Auvelity in parts due to lack of coverage - hasn't happened yet.  Will continue [id number] maintenance while stabilizing meds and awaiting ortho surgery.  Mood: 7/10 (10=best), PHQ-9: 9    M11 10/25/24: Patient is feeling overwhelmed by medical appointments and commitments, has had worsening irritability related to this.  Recognizes that her mood is still overall better, but these acute stressors lead to an up-and-down over the course of the week, and there have been more downs this week.  Started faux-velity, some headaches but hoping these will resolve.  She is concerned that she won't be able to make it 6 weeks after her ortho surgery without ECT, but will continue to discuss.  Denies PDW/SI - \"I've done a reassessment\" and recognizes these stressors make things hard but \"I used to love life.\"  Denies adverse effects other than insomnia night before treatment due to holding Neurontin - will ask about alternate sleep options.   M12 Mood 6-7/10 going to group. Got good news from right heart cath. She has word finding difficulty. Wants to take up Mahjong. Got out into her yard for the first time in a long while. No falls. She doesn't like abilify thinks it is causing tinnitus and jaw clenching but will try to keep for now. PHQ-9=11 QIDS=18  M13 11/22/24: Mood has been \"not great\" the last two weeks, in setting of poor sleep, acute psychosocial stressors, medications changes, and cutting " "down on cannabis.  Wants to talk to primary psychiatrist about better control for sleep.  Does still feel that clarity of thought and motivation remain high overall, ECT still helping.  Will keep q2week ECT for now, discussed trial at 3    Complicated by: new onset Afib, regular rate -> referred to ED  M14 12/13/24: Delayed scheduled visit last week due to need for Cardiac clearance following Afib from last treatment.  Saw cardiology, who cleared patient to continue without anticoagulation due to CHADSVASC = 2.  Today, mood started to drift about a week ago, most notably irritability, but not the worst it's been.  Denies PDW/SI.  She is anticipating her surgery 1/8/24, and would like to do a treatment in 2 weeks \"as usual\" and then ideally on 1/6/24 right before the surgery.  We will book next treatment in 2 weeks, but patient to call if she's doing well and okay to space additional week.  Mood: 6/10 (10=best), PHQ-9: 15      01/22/25  Returns today after prolonged interval because she needed repeat medical clearance due to multiple ED visits for falls and dizziness. She discontinued her oral antidepressants. She feels irritable anxious overwhelmed by medical complications. Cancelled her shoulder surgery. She blames serotonin for her dizziness and falls and gillespie snot want to take psychotropic medication she thinks ECT is the most helpful for her. Mood 2/10. Showered 3-4 x a week sitz bath and sink hair wash on other days, eating 2 meals a day managing meds but difficulty initing decluttering tasks as she is a hoarder. Passive SI \"Everything just feels so hard\" PHQ_9=12   M 02/05/25  She is anxious, struggling with early morning waking, but otherwise says ECT is doing well for her mood.  She wants to stay at 2 weeks.  No side effects, no SI.   M 02/19/25  Doing well overall, sleeping better, but got very distressed this week by the news. Is exercising, using lavender supplements to sleep, no safety issues or ECT side " effects. Has OP appt with Dr Varela on March 5th. PHQ-9=14.   M 3/12/25 Mood ok but she's tired, having insomnia and would like to do less of the ECT sessions. Having word finding difficulties. PHQ-9=15  Reviewed Dr Varela consultation. Interested in VNS.  M 04/02/25: Missed last appointment due to physical illness - rescheduled for today.  Has been difficult recently with health-related stressors and political stressors.  Working with therapist once per week, which she finds helpful.  She continues to have benefit from ECT to her mood, although was a bit teary this past week.  Some worsening anterograde amnesia but denies retrograde.  Will continue treatment at [id number].  PHQ-9: 13 , QIDS: 18          Pause for the Cause:     Correct patient Yes   Correct procedure/laterality settings: Yes           Intra-Procedure Documentation:     ECT #: 31   Treatment number this series: M19   Total treatment number: 31     Type of ECT:  Right, unilateral ultrabrief    ECT Medications:    Toradol 30mg iv - for headache/myalgia     Brevital: 100 mg (incr from 90 mg as still awake)   Succinyl Choline: 90 mg    BP - per anesthesia team     ECT Strip Summary: RUL Titration #3: 38.4 mC   Energy Level:  384.0mC, 0.3 ms, 100 Hz, 8 sec, 800 mA     Motor Seizure Duration: 25 seconds  EEG Seizure Duration: 53 seconds      Complications: none      Plan:   - Plan for maintenance in 3 weeks    - Monitor depression severity with clinical assessment augmented with PHQ9 every other treatment  - Continue current medications    Discharge instructions:   -- Remember to NOT take gabapentin after 6pm the night before each treatment  -- During maintenance ECT, you can't drive for 24 hours after each treatment.  - Call [facility] to learn more about the ongoing VNS study. (Eligibility would require a more severe current symptoms): Phone: [phone]    Boo Riley MD  Department of Psychiatry & Behavioral  Science  [facility]  Preferred Contact: Epic Lili / I[place]

## 2025-04-02 NOTE — PROGRESS NOTES
[facility]                                     Progress Note    Patient Name: Randi Cleary  Date: 2025        Service Type: Individual      Session Start Time: 9:30  Session End Time: 10:29     Session Length: 53+    Session #: 23    Attendees: Client    Service Modality:  Video Visit:      Provider verified identity through the following two step process.  Patient provided:  Patient  and Patient is known previously to provider    Telemedicine Visit: The patient's condition can be safely assessed and treated via synchronous audio and visual telemedicine encounter.      Reason for Telemedicine Visit: Patient has requested telehealth visit    Originating Site (Patient Location): Patient's home    Distant Site (Provider Location): Provider Remote Setting- Home Office    Consent:  The patient/guardian has verbally consented to: the potential risks and benefits of telemedicine (video visit) versus in person care; bill my insurance or make self-payment for services provided; and responsibility for payment of non-covered services.     Patient would like the video invitation sent by:  My Chart    Mode of Communication:  Video Conference via Amwell    Distant Location (Provider):  Off-site    As the provider I attest to compliance with applicable laws and regulations related to telemedicine.        DATA  Interactive Complexity: No  Crisis: No        Progress Since Last Session (Related to Symptoms / Goals / Homework):   Symptoms: Improving      Homework:  n/a      Episode of Care Goals: Satisfactory progress - ACTION (Actively working towards change); Intervened by reinforcing change plan / affirming steps taken     Current / Ongoing Stressors and Concerns:   Overwhelmed last week with appointments.  Also, last week was anniversary of father's death and mother's birthday.  Feeling financial strain and struggling with how to navigate decisions to make regarding that. Noticed a release after  talking about the stressors.      Treatment Objective(s) Addressed in This Session:   Distress tolerance     Intervention:   Validation, safety assessment.  Encouragement.  Small steps toward larger goals.  Setting boundaries on intense experiences/information in order to promote self-care.        Assessments completed prior to visit:  The following assessments were completed by patient for this visit:  PHQ9:       2/3/2025    11:13 AM 2/10/2025    11:46 AM 2/17/2025     9:49 AM 2/24/2025     9:43 AM 3/3/2025    10:28 AM 3/5/2025    11:44 AM 3/11/2025     8:36 PM   PHQ-9 SCORE   PHQ-9 Total Score MyChart 11 (Moderate depression) 15 (Moderately severe depression) 16 (Moderately severe depression) 15 (Moderately severe depression) 15 (Moderately severe depression) 15 (Moderately severe depression) 14 (Moderate depression)   PHQ-9 Total Score 11  15  16  15  15  15  14        Patient-reported     GAD7:       10/22/2024     8:59 AM 11/4/2024     2:38 PM 12/3/2024     9:10 AM 12/17/2024     9:13 AM 1/23/2025     8:47 AM 2/10/2025    10:22 PM 3/3/2025    10:45 AM   CHAVO-7 SCORE   Total Score 11 (moderate anxiety) 10 (moderate anxiety) 17 (severe anxiety) 13 (moderate anxiety) 11 (moderate anxiety) 14 (moderate anxiety) 16 (severe anxiety)   Total Score 11    11 10  17  13  11  14  16        Patient-reported     CAGE-AID:       6/22/2020     7:12 PM 1/18/2022    11:15 PM 6/6/2024     8:00 AM   CAGE-AID Total Score   Total Score 4 4 4   Total Score MyChart 4 (A total score of 2 or greater is considered clinically significant) 4 (A total score of 2 or greater is considered clinically significant)      PROMIS 10-Global Health (all questions and answers displayed):       11/6/2024    12:54 PM 11/14/2024    11:35 AM 11/26/2024     5:00 PM 12/4/2024     9:28 AM 12/18/2024     9:10 AM 12/30/2024     5:44 PM 4/1/2025    11:00 AM   PROMIS 10   In general, would you say your health is: Fair Fair  Good Fair Fair    In general, would  you say your quality of life is: Poor Poor  Poor Fair Poor    In general, how would you rate your physical health? Good Fair  Good Fair Fair    In general, how would you rate your mental health, including your mood and your ability to think? Fair Fair  Fair Fair Poor    In general, how would you rate your satisfaction with your social activities and relationships? Poor Poor  Poor Poor Poor    In general, please rate how well you carry out your usual social activities and roles Poor Fair  Poor Poor Poor    To what extent are you able to carry out your everyday physical activities such as walking, climbing stairs, carrying groceries, or moving a chair? Moderately Moderately  Moderately Moderately Moderately    In the past 7 days, how often have you been bothered by emotional problems such as feeling anxious, depressed, or irritable? Often Often  Often Often Often    In the past 7 days, how would you rate your fatigue on average? Moderate Moderate  Moderate Moderate Moderate    In the past 7 days, how would you rate your pain on average, where 0 means no pain, and 10 means worst imaginable pain? 7 7  8 7 8    In general, would you say your health is: 2 2  3 2 2 3   In general, would you say your quality of life is: 1 1  1 2 1 1   In general, how would you rate your physical health? 3 2  3 2 2 2   In general, how would you rate your mental health, including your mood and your ability to think? 2 2  2 2 1 2   In general, how would you rate your satisfaction with your social activities and relationships? 1 1  1 1 1 2   In general, please rate how well you carry out your usual social activities and roles. (This includes activities at home, at work and in your community, and responsibilities as a parent, child, spouse, employee, friend, etc.) 1 2  1 1 1 1   To what extent are you able to carry out your everyday physical activities such as walking, climbing stairs, carrying groceries, or moving a chair? 3 3  3 3 3 3   In the  past 7 days, how often have you been bothered by emotional problems such as feeling anxious, depressed, or irritable? 4 4  4 4 4 4   In the past 7 days, how would you rate your fatigue on average? 3 3  3 3 3 3   In the past 7 days, how would you rate your pain on average, where 0 means no pain, and 10 means worst imaginable pain? 7 7  8 7 8 8   Global Mental Health Score 6  6   6  7  5  7    Global Physical Health Score 11  10   11  10  10  10    PROMIS TOTAL - SUBSCORES 17  16   17  17  15  17        Information is confidential and restricted. Go to Review Flowsheets to unlock data.    Patient-reported     [place] Suicide Severity Rating Scale (Lifetime/Recent)      1/9/2023     1:00 PM 5/21/2024     4:49 PM 5/21/2024     9:00 PM 6/6/2024     8:00 AM 7/10/2024    12:00 PM 11/22/2024    11:05 AM 2/26/2025     5:59 PM   [place] Suicide Severity Rating (Lifetime/Recent)   Q1 Wish to be Dead (Lifetime)   Yes       Comments   OD on medications       Q2 Non-Specific Active Suicidal Thoughts (Lifetime)   No       Most Severe Ideation Rating (Lifetime)   5       Most Severe Ideation Description (Lifetime)   OD on medication       Frequency (Lifetime)   3       Duration (Lifetime)   3       Controllability (Lifetime)   2       Protective Factors  (Lifetime)   4       Reasons for Ideation (Lifetime)   5       Q1 Wished to be Dead (Past Month) yes 1-->yes 1-->yes 1-->yes  0-->no 0-->no   Q2 Suicidal Thoughts (Past Month) no 1-->yes 1-->yes 1-->yes  0-->no 0-->no   Q3 Suicidal Thought Method no 0-->no 0-->no 1-->yes      Q4 Suicidal Intent without Specific Plan no 1-->yes 0-->no 0-->no      Q5 Suicide Intent with Specific Plan no 0-->no 0-->no 1-->yes      Q6 Suicide Behavior (Lifetime) yes 1-->yes 0-->no 1-->yes  0-->no 0-->no   If yes to Q6, within past 3 months? no 1-->yes 0-->no 0-->no      Level of Risk per Screen moderate risk high risk moderate risk high risk  no risks indicated no risks indicated   1. Wish to be Dead  (Lifetime)     N     2. Non-Specific Active Suicidal Thoughts (Lifetime)     N           ASSESSMENT: Current Emotional / Mental Status (status of significant symptoms):   Risk status (Self / Other harm or suicidal ideation)   Patient denies current fears or concerns for personal safety.   Patient denies current or recent suicidal ideation or behaviors.   Patient denies current or recent homicidal ideation or behaviors.   Patient denies current or recent self injurious behavior or ideation.   Patient denies other safety concerns.   Patient reports there has been no change in risk factors since their last session.     Patient reports there has been no change in protective factors since their last session.     A safety and risk management plan has been developed including: Patient consented to co-developed safety plan on 6/4/24.  Safety and risk management plan was reviewed.   Patient agreed to use safety plan should any safety concerns arise.  A copy was made available to the patient.     Appearance:   Appropriate    Eye Contact:   Good    Psychomotor Behavior: Normal  Agitated    Attitude:   Friendly Pleasant   Orientation:   All   Speech    Rate / Production: Talkative    Volume:  Normal    Mood:    Anxious  Sad    Affect:    Tearful   Thought Content:  Clear    Thought Form:  Goal Directed    Insight:    Fair      Medication Review:   No changes to current psychiatric medication(s)     Medication Compliance:   Yes     Changes in Health Issues:   None reported     Chemical Use Review:   Substance Use: Chemical use reviewed, no active concerns identified      Tobacco Use: No current tobacco use.      Diagnosis:  1. 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 Generalized anxiety disorder        Collateral Reports Completed:   Not Applicable    PLAN: (Patient Tasks / Therapist Tasks / Other)  Continue following safety plan.  Continue to monitor exposure to news and political  information.       Erika GOODWINAislinn Stanislav, LMFT                                                         ______________________________________________________________________    Individual Treatment Plan    Patient's Name: Randi Cleary  YOB: 1955    Date of Creation: 7/17/2024  Date Treatment Plan Last Reviewed/Revised: 7/17/2024, 10/30/24, 2/3/2025    DSM5 Diagnoses:   296.33 (F33.2) Major Depressive Disorder, Recurrent Episode, Severe   300.02 (F41.1) Generalized Anxiety Disorder  Psychosocial / Contextual Factors: history of trauma  PROMIS (reviewed every 90 days):     Referral / Collaboration:  Discussed and patient will pursue a therapy group for depressive symptoms.    Anticipated number of session for this episode of care: 9-12 sessions  Anticipation frequency of session: Weekly  Anticipated Duration of each session: 38-52 minutes  Treatment plan will be reviewed in 90 days or when goals have been changed.       MeasurableTreatment Goal(s) related to diagnosis / functional impairment(s)  Goal 1: Client will keep self safe and eliminate suicidal ideation and self-harm behaviors.    Objective #A (Client Action)    Client will make a list of at least 10 skills or activities that you will to use to distract from urges to harm self.  Status: Completed - Date: 10/30/24       Intervention(s)  Therapist will provide ideas and strategies for generating coping skills list.    Objective #B  Client will make a list of pros and cons for tolerating and not tolerating an urge to harm self.  Status: Continued - Date(s): 2/3/2025    Intervention(s)  Therapist will guide client through DBT-style Pros/Cons list for behavior change.    Objective #C  Client will identify and practice the new strategies for dealing with strong emotions, learn and practice relaxation breathing.  Status: Continued - Date(s): 4/02/25      Intervention(s)  Therapist will teach distraction skills. Practice them within session and  outside of session.    Goal 2: Client will report reduction in anxiety also as evidenced by reduction of CHAVO-7 score below 6 points within the next 12 weeks.    Objective #A (Client Action)    Client will identify at least three triggers for anxiety.  Status: Completed - Date: 10/30/24       Intervention(s)  Therapist will provide educational materials on common triggers and signs of anxiety.    Objective #B  Client will use relaxation strategies at least two times per day to reduce the physical symptoms of anxiety.  Status: Continued - Date(s): 2/3/2025    Intervention(s)  Therapist will teach relaxation strategies such as mindfulness, deep breathing, muscle relaxation, and sensory activities.    Objective #C  Client will use cognitive strategies identified in therapy to challenge anxious thoughts.  Status: Continued - Date(s): 2/3/2025    Intervention(s)  Therapist will teach strategies for cognitive modification using REBT model.    Goal 3: Client will report improved mood as indicated by PHQ-9 score below 6 for consistent 8 weeks.    Objective #A (Client Action)    Status: Continued - Date: 2/3/2025    Client will Increase interest, engagement, and pleasure in doing things.    Intervention(s)  Therapist will assign homework for daily involvement in pleasant activities.    Objective #B  Client will Identify negative self-talk and behaviors: challenge core beliefs, myths, and actions.    Status: Continued - Date(s): 2/3/2025    Intervention(s)  Therapist will teach strategies for cognitive modification using REBT models.    Objective #C  Client will Improve quantity and quality of night time sleep / decrease daytime naps.  Status: Continued - Date(s): 2/3/2025    Intervention(s)  Therapist will teach sleep hygiene strategies.  Assign homework for daily practice.    Goal 4: Client will report reduced or eliminated physiological activation when recalling a distressing event including decreased re-experiencing,  decreased avoidance, and decreased hyperarousal.    Objective #A (Client Action)    Status:  Continued: 2/3/25       Client will acquire knowledge of trauma, common symptoms post-trauma, emotion regulation strategies, stress management strategies, and cognitive coping strategies.    Intervention(s)  Therapist will teach about effects of trauma on mind and body; encourage emotion identification and expression; practice with client the stress management strategies; and teach cognitive modification.    Objective #B  Client will use Brainspotting while focusing on physiological sensations related to distressing events.  Client will assess and rate level of physiological activation.  Status: Continued - Date(s): 2/3/2025    Intervention(s)  Therapist will provide use a pointer or other materials to assist client in holding a brainspot in their visual field.  Therapist might also provide biolateral music through headphones to deepen the experience.         Patient has reviewed and agreed to the above plan.      Erika [place], LMFT  July 17, 2024

## 2025-04-02 NOTE — DISCHARGE INSTRUCTIONS
ECT Discharge Instructions      During your ECT series:    Do not drive or work heavy equipment until 7 days after your last treatment.  Do not drink alcohol or use street drugs (illicit drugs) while you are having treatments.  Do not make important decisions, including legal decisions.    After each treatment:    Get plenty of rest. A responsible adult must stay with your for at least 6 hours.  Avoid heavy or risky activities for 24 hours while in maintenance ECT.   Do not drive for at least 24 hours after your treatment while in maintenance ECT.   If you have more than one treatment within one week, do not drive for 7 days after your last treatment.   If you feel light-headed, sit for a few minutes before standing. Have someone help you get up.  If you have nausea (feel sick to your stomach): Drink only clear liquids such as apple juice, ginger ale, broth or 7UP, Be sure to drink plenty of liquids. Move to a normal diet as you feel able.   If you received Toradol, wait 6 hours before taking ibuprofen.  Call your doctor if:   You have a fever over 100F (37.7 C) (taken under the tongue), or a fever that last more than 24 hours.  Your IV site is red, swollen, very painful or is getting more tender.  You have nausea that gets very bad or does not improve.    If you have any symptoms after ECT, tell our staff before your next treatment.  The ECT Department can be reached at [phone].  The ECT Department is open Mondays, Wednesdays and Fridays from 7:00 AM to 2:00 PM.    To speak to a doctor, call:  Your primary care provider or [facility] for Dr. Riley, Dr. Beavers, Dr. Hutchison or Dr. Cotter PHONE: [phone]; fax: [phone]    New instructions:  Next appointment will be in 3 weeks on 4/23/25, a  will call you to set up the appointment.    Your next ECT appointment will now be scheduled by a scheduling service. They should call you within 24 hours of your ECT appointment. For any urgent  scheduling needs or to change your appointment, please call the ECT department at [phone] and they will get in touch with scheduling for you.     You have received Toradol today at 12:48 PM. Toradol is an NSAID.  Do not take any NSAID's including: Ibuprofen, Naproxen Sodium, Asprin, Advil, Motrin, Aleve, Shannan, etc., until six hours after the above time which would be 6:48 PM. If you have any questions check back with your Physician, or pharmacist.     Covid-19 Testing:  We are no longer requiring covid tests prior to your procedure. If you are ill, please call the ECT department to discuss plan for rescheduling your appointment. [phone]    NEW: Please come to the [facility] entrance at [address] [place] [address] and check in at Room NB14. You will receive your wristband and stickers there and can then come down to the ECT department in the basement of the [facility].       After your appointment, you may be picked up at the [facility] entrance, which is located at [address] [place] [address].  [facility], about 1 block North of the [facility] entrance.    Transported by:   Sidney    Reviewed AVS discharge instructions with:   Sidney

## 2025-04-07 ENCOUNTER — MYC REFILL (OUTPATIENT)
Dept: PALLIATIVE MEDICINE | Facility: OTHER | Age: 70
End: 2025-04-07
Payer: MEDICARE

## 2025-04-07 DIAGNOSIS — M19.071 OSTEOARTHRITIS OF RIGHT ANKLE AND FOOT: ICD-10-CM

## 2025-04-07 RX ORDER — OXYCODONE HYDROCHLORIDE 5 MG/1
5 TABLET ORAL
Qty: 70 TABLET | Refills: 0 | Status: SHIPPED | OUTPATIENT
Start: 2025-04-07

## 2025-04-07 NOTE — TELEPHONE ENCOUNTER
Received request for a refill(s) of   oxyCODONE (ROXICODONE) 5 MG tablet      Last dispensed from pharmacy on 3/25/2025    Patient's last office/virtual visit by prescribing provider on 3/10/2025  Next office/virtual appointment scheduled for 6/9/2025    Last urine drug screen date 8/1/2024  Current opioid agreement on file (completed within the last year) YesDate of opioid agreement: 8/1/2024    E-prescribe to [id number] - [place], [place] - [address] [facility]   pharmacy    Will route to nursing [place] for review and preparation of prescription(s).

## 2025-04-07 NOTE — TELEPHONE ENCOUNTER
Refills have been requested for the following medications:         oxyCODONE (ROXICODONE) 5 MG tablet [AXEL WIGGINS]     Preferred pharmacy: [id number] [address] [facility]

## 2025-04-08 ENCOUNTER — MYC MEDICAL ADVICE (OUTPATIENT)
Dept: INTERNAL MEDICINE | Facility: CLINIC | Age: 70
End: 2025-04-08
Payer: MEDICARE

## 2025-04-08 ENCOUNTER — PATIENT OUTREACH (OUTPATIENT)
Dept: CARE COORDINATION | Facility: CLINIC | Age: 70
End: 2025-04-08
Payer: MEDICARE

## 2025-04-08 DIAGNOSIS — J42 CHRONIC BRONCHITIS, UNSPECIFIED CHRONIC BRONCHITIS TYPE (H): Primary | ICD-10-CM

## 2025-04-08 RX ORDER — FLUTICASONE FUROATE AND VILANTEROL 100; 25 UG/1; UG/1
1 POWDER RESPIRATORY (INHALATION) DAILY
Qty: 60 EACH | Refills: 3 | Status: SHIPPED | OUTPATIENT
Start: 2025-04-08

## 2025-04-08 NOTE — LETTER
[facility]  Patient Centered Plan of Care  About Me:        Patient Name:  Randi Zhou,     I hope you are well! Here is a copy of her Care Plan with the goal we are supporting you with at this time. Please let me know if I can help in any way.     Thanks,       David Myhre, RN   CCC RN  [phone]    YOB: 1955  Age:         69 year old   [place] MRN:    [id number] Telephone Information:  Home Phone [phone]   Mobile [phone]       Address:  [address] [place] [address] Email address:  [email or web address]      Emergency Contact(s)    Name Relationship Lgl Grd Work Phone Home Phone Mobile Phone   1. JOANIE ROBERTO Spouse No NONE [phone]   2. ANGELA ROBRETO Sister-in-Law   [phone]   3. BRITTNY PADILLA Friend   [phone]            Primary language:  English     needed? No   [employer]:  [phone] op. 1  Other communication barriers:None    Preferred Method of Communication:     Current living arrangement: I live in a private home with spouse    Mobility Status/ Medical Equipment: Independent w/Device        Health Maintenance  Health Maintenance Reviewed: Due/Overdue   Health Maintenance Due   Topic Date Due    HF ACTION PLAN  Never done    DIABETIC FOOT EXAM  Never done    COPD ACTION PLAN  Never done    HEPATITIS A IMMUNIZATION (1 of 2 - Risk 2-dose series) Never done    ZOSTER IMMUNIZATION (1 of 2) Never done    MEDICARE ANNUAL WELLNESS VISIT  Never done    COLORECTAL CANCER SCREENING  02/25/2024    COVID-19 Vaccine (9 - 2024-25 season) 04/09/2025          My Access Plan  Medical Emergency 911   Primary Clinic Line [facility] [phone]   24 Hour Appointment Line [phone] or  [phone]) (toll-free)   24 Hour Nurse Line [phone] (toll-free)   Preferred Urgent Care [facility], [phone]     Preferred  [facility]  [phone]     Preferred Pharmacy [id number] [place], [place] - [address]     Behavioral Health Crisis Line The National Suicide Prevention Lifeline at 1-220.802.7442 or Text/Call 988           My Care Team Members  Patient Care Team         Relationship Specialty Notifications Start End    Kaushik Hobbs, CNP PCP - General Nurse Practitioner - Family  8/21/24     Phone: [phone] Fax: [phone] [address] [facility] [address] [facility] [address]    Eli Hilton, RN Specialty Care Coordinator Cardiology Admissions 8/13/19     Pulmonary HTN    Phone: [phone] Pager: [phone] Fax: [phone]       Bindu Walden, RN Specialty Care Coordinator Cardiology Admissions 8/26/19     Pulmonary HTN    Phone: [phone] Pager: [phone] Fax: [phone]       Alee Coker MD MD INTERNAL MEDICINE - ENDOCRINOLOGY, DIABETES & METABOLISM  9/26/19     Phone: [phone] Fax: [phone]         [address] [place] [address] [address] [facility] [address]    Francisco J Edwards MD MD Cardiology  3/17/20     Phone: [phone] Fax: [phone]         [address] [facility] [address]    Francisco J Edwards MD Assigned Heart and Vascular Provider   10/23/20     Phone: [phone] Fax: [phone]         [address] [facility] [address]    Liza Reynoso, RN Specialty Care Coordinator Cardiology Admissions 4/5/22     Pulmonary HTN    Phone: [phone] Pager: [phone] Fax: [phone]       Waylon Catherine, PharmD Pharmacist Pharmacist  10/3/22     Phone: [phone] Fax: [phone]         [facility] [address] [place] [address]    Dusty Dubois MD Assigned Pain Medication Provider   1/9/23     Phone: [phone] Fax: [phone] [address] [facility] [address]    Aparna Hutchinson MD MD Neurology  3/27/23     Phone:  [phone] Fax: [phone]         [address] [facility] [address]    Miki Richardson, RN Specialty Care Coordinator  Admissions 4/19/23     Phone: [phone] Pager: [phone]        Roland Das MD MD Psychiatry  7/27/23     Phone: [phone] Fax: [phone]         [address] [facility] [address]    Tova Pablo MD Assigned Cancer Care Provider   8/5/23     Phone: [phone] Fax: [phone]         [address] [facility] [address]    Kaushik Huffman DPM Assigned Surgical Provider   10/21/23     Phone: [phone] Fax: [phone]         [address] [facility] [address] [facility] [address]    Felicia Hicks, PharmD Pharmacist Pharmacist  10/31/23     Phone: [phone] Fax: [phone]         [address] [facility] [address] [facility] [address]    Felicia Hicks, PharmD Assigned MTM Pharmacist   11/4/23     Phone: [phone] Fax: [phone]         [address] [place] [address]    Amy Hughes, [facility] Pharmacist   12/29/23     Phone: [phone] Fax: [phone]         [facility] [address] [facility] [address] [facility] [address]    Alee Coker MD Assigned Endocrinology Provider   1/6/24     Phone: [phone] Fax: [phone]         [address] [facility] [address] [facility] [address]    Sharmila Gregory MD Assigned Pulmonology Provider   2/23/24     Phone: [phone] Fax: [phone]         [address]  [facility] [address]    Crissy Musa LPN Specialty Care Coordinator Hematology & Oncology Admissions 4/16/24     Jeannette Mendoza APRN CNP Nurse Practitioner Psychiatry  6/27/24     Phone: [phone] Fax: [phone]         [address] [place] [address]    Teetee Adames MD MD Cardiovascular Disease  7/16/24     Phone: [phone] Fax: [phone]         [address] [facility] [address]    Tiara Javier APRN CNS Clinical Nurse Specialist Anesthesiology  7/16/24     Phone:  [phone] Fax: [phone]         [address] [facility] [address] [facility] [address]    Arlyn Stern PA Physician Assistant Cardiovascular Disease  10/15/24     Phone: [phone] Pager: [phone] Fax: [phone] [address] [facility] [address]    Kaushik Hobbs, CNP Assigned PCP   12/23/24     Phone: [phone] Fax: [phone] [address] [facility] [address] [facility] [address]    Erika Colorado, LMFT Assigned Behavioral Health Provider   12/23/24     Phone: [phone]          [facility]  [address] [facility] [address]    Tova Pablo MD Physician Hematology Admissions 1/27/25     Phone: [phone] Fax: [phone]         [address] [facility] [address]                My Care Plans  Self Management and Treatment Plan    Care Plan  Care Plan: Mental Health       Problem: Mental Health Symptoms Need Improvement       Goal: I would like to feel as though my mental health has improved.       Start Date: 10/16/2024 Expected End Date: 10/15/2025    Recent Progress: 50%    Priority: High    Note:     Barriers: history of anxiety, bipolar disorder, trauma related disorder  Strengths: strong advocate for herself  Patient expressed understanding of goal: yes  Action steps to achieve this goal:  1. I will continue to attend all appointments with my therapist, with my psychiatric provider and attend all ECT treatments.   2. I continue to attend my group therapy appointments.   3. I will continue to the use the skills I have learned through therapy, partial hospitalization program and therapy group.   4. I will take my medications as directed.   5. I will report progress towards this goal at schedule outreach telephone calls from my Care Coordinator.     Discussed on 1/8/25                              Action Plans on File:                       Advance Care Plans/Directives:   Advanced Care Plan/Directives on file: No    Discussed with  patient/caregiver(s): No data recorded           My Medical and Care Information  Problem List   Patient Active Problem List   Diagnosis    DJD (degenerative joint disease), ankle and foot    Hereditary hemochromatosis    Postablative hypothyroidism    History of pheochromocytoma    Hx of corticosteroid therapy    Class 2 obesity due to excess calories without serious comorbidity in adult    KYAW (obstructive sleep apnea)    Prediabetes    Hypokalemia    COPD (chronic obstructive pulmonary disease) (H)    Generalized anxiety disorder    Restless leg syndrome    Vitamin B12 deficiency    Vitamin D deficiency    Morbid obesity (H)    Cord compression (H)    History of cervical spinal surgery    Cervical stenosis of spinal canal    Alcohol use disorder, moderate, in sustained remission (H)    Psoriatic arthropathy (H)    Primary osteoarthritis involving multiple joints    Gastroesophageal reflux disease with esophagitis without hemorrhage    Numbness in both hands    Opioid type dependence, continuous (H)    Trauma and stressor-related disorder    Post-menopausal    Depression with anxiety    Severe recurrent major depression without psychotic features (H)    MDD (major depressive disorder), recurrent episode, severe (H)    Status post electroconvulsive therapy    PAF (paroxysmal atrial fibrillation) (H)    Hiatal hernia    Paroxysmal atrial fibrillation (H)    Uric acid arthropathy    Chronic, continuous use of opioids    Severe episode of recurrent major depressive disorder, without psychotic features (H)    Low bone density    Major depressive disorder, recurrent, severe with psychotic symptoms (H)      Current Medications:  Please refer to the most recent medication list provided to you by your medical team and reach out to your provider with any questions or to make any corrections.    Care Coordination Start Date: 9/21/2023   Frequency of Care Coordination: monthly, more frequently as needed     Form Last Updated:  04/08/2025

## 2025-04-08 NOTE — PROGRESS NOTES
Clinic Care Coordination Contact  [facility]/Voicemail    Clinical Data: Care Coordinator Outreach    Outreach Documentation Number of Outreach Attempt   4/8/2025  11:10 AM 1   4/8/2025   1:50 PM 1       Left message on patient's voicemail with call back information and requested return call.      Plan: Care Coordinator will try to reach patient again in 10 business days.    David Myhre, RN   [facility] RN  [phone]

## 2025-04-09 ENCOUNTER — PATIENT OUTREACH (OUTPATIENT)
Dept: CARE COORDINATION | Facility: CLINIC | Age: 70
End: 2025-04-09
Payer: MEDICARE

## 2025-04-09 ENCOUNTER — VIRTUAL VISIT (OUTPATIENT)
Dept: PSYCHOLOGY | Facility: CLINIC | Age: 70
End: 2025-04-09
Payer: MEDICARE

## 2025-04-09 DIAGNOSIS — J42 CHRONIC BRONCHITIS, UNSPECIFIED CHRONIC BRONCHITIS TYPE (H): Primary | ICD-10-CM

## 2025-04-09 DIAGNOSIS — F33.2 MAJOR DEPRESSIVE DISORDER, RECURRENT SEVERE WITHOUT PSYCHOTIC FEATURES (H): Primary | ICD-10-CM

## 2025-04-09 DIAGNOSIS — F41.1 GENERALIZED ANXIETY DISORDER: ICD-10-CM

## 2025-04-09 PROCEDURE — 90837 PSYTX W PT 60 MINUTES: CPT | Mod: 95 | Performed by: MARRIAGE & FAMILY THERAPIST

## 2025-04-09 RX ORDER — FLUTICASONE PROPIONATE AND SALMETEROL 250; 50 UG/1; UG/1
1 POWDER RESPIRATORY (INHALATION) EVERY 12 HOURS
Qty: 60 EACH | Refills: 2 | Status: SHIPPED | OUTPATIENT
Start: 2025-04-09

## 2025-04-09 ASSESSMENT — ANXIETY QUESTIONNAIRES
GAD7 TOTAL SCORE: 12
GAD7 TOTAL SCORE: 12
2. NOT BEING ABLE TO STOP OR CONTROL WORRYING: SEVERAL DAYS
IF YOU CHECKED OFF ANY PROBLEMS ON THIS QUESTIONNAIRE, HOW DIFFICULT HAVE THESE PROBLEMS MADE IT FOR YOU TO DO YOUR WORK, TAKE CARE OF THINGS AT HOME, OR GET ALONG WITH OTHER PEOPLE: VERY DIFFICULT
1. FEELING NERVOUS, ANXIOUS, OR ON EDGE: NEARLY EVERY DAY
6. BECOMING EASILY ANNOYED OR IRRITABLE: MORE THAN HALF THE DAYS
5. BEING SO RESTLESS THAT IT IS HARD TO SIT STILL: SEVERAL DAYS
4. TROUBLE RELAXING: MORE THAN HALF THE DAYS
7. FEELING AFRAID AS IF SOMETHING AWFUL MIGHT HAPPEN: MORE THAN HALF THE DAYS
3. WORRYING TOO MUCH ABOUT DIFFERENT THINGS: SEVERAL DAYS
8. IF YOU CHECKED OFF ANY PROBLEMS, HOW DIFFICULT HAVE THESE MADE IT FOR YOU TO DO YOUR WORK, TAKE CARE OF THINGS AT HOME, OR GET ALONG WITH OTHER PEOPLE?: VERY DIFFICULT

## 2025-04-09 NOTE — PROGRESS NOTES
[facility]                                     Progress Note    Patient Name: Randi Cleary  Date: 2025        Service Type: Individual      Session Start Time: 9:30  Session End Time: 10:29     Session Length: 53+    Session #: 24    Attendees: Client    Service Modality:  Video Visit:      Provider verified identity through the following two step process.  Patient provided:  Patient  and Patient is known previously to provider    Telemedicine Visit: The patient's condition can be safely assessed and treated via synchronous audio and visual telemedicine encounter.      Reason for Telemedicine Visit: Patient has requested telehealth visit    Originating Site (Patient Location): Patient's home    Distant Site (Provider Location): Provider Remote Setting- Home Office    Consent:  The patient/guardian has verbally consented to: the potential risks and benefits of telemedicine (video visit) versus in person care; bill my insurance or make self-payment for services provided; and responsibility for payment of non-covered services.     Patient would like the video invitation sent by:  My Chart    Mode of Communication:  Video Conference via [employer]    Distant Location (Provider):  Off-site    As the provider I attest to compliance with applicable laws and regulations related to telemedicine.        DATA  Interactive Complexity: No  Crisis: No        Progress Since Last Session (Related to Symptoms / Goals / Homework):   Symptoms: No change      Homework:  n/a      Episode of Care Goals: Satisfactory progress - ACTION (Actively working towards change); Intervened by reinforcing change plan / affirming steps taken     Current / Ongoing Stressors and Concerns:   Overwhelmed with financial situation and medical bills.  Strong emotions about these topics and feeling consumed with worry.  Reported feeling better after talking about the concerns.       Treatment Objective(s) Addressed in This  Session:   Distress tolerance     Intervention:   Validation, safety assessment.  Encouragement.  Small steps toward larger goals.  Setting boundaries on intense experiences/information in order to promote self-care.        Assessments completed prior to visit:  The following assessments were completed by patient for this visit:  PHQ9:       2/3/2025    11:13 AM 2/10/2025    11:46 AM 2/17/2025     9:49 AM 2/24/2025     9:43 AM 3/3/2025    10:28 AM 3/5/2025    11:44 AM 3/11/2025     8:36 PM   PHQ-9 SCORE   PHQ-9 Total Score MyChart 11 (Moderate depression) 15 (Moderately severe depression) 16 (Moderately severe depression) 15 (Moderately severe depression) 15 (Moderately severe depression) 15 (Moderately severe depression) 14 (Moderate depression)   PHQ-9 Total Score 11  15  16  15  15  15  14        Patient-reported     GAD7:       11/4/2024     2:38 PM 12/3/2024     9:10 AM 12/17/2024     9:13 AM 1/23/2025     8:47 AM 2/10/2025    10:22 PM 3/3/2025    10:45 AM 4/9/2025     8:50 AM   CHAVO-7 SCORE   Total Score 10 (moderate anxiety) 17 (severe anxiety) 13 (moderate anxiety) 11 (moderate anxiety) 14 (moderate anxiety) 16 (severe anxiety) 12 (moderate anxiety)   Total Score 10  17  13  11  14  16  12        Patient-reported     CAGE-AID:       6/22/2020     7:12 PM 1/18/2022    11:15 PM 6/6/2024     8:00 AM   CAGE-AID Total Score   Total Score 4 4 4   Total Score MyChart 4 (A total score of 2 or greater is considered clinically significant) 4 (A total score of 2 or greater is considered clinically significant)      PROMIS 10-Global Health (all questions and answers displayed):       11/14/2024    11:35 AM 11/26/2024     5:00 PM 12/4/2024     9:28 AM 12/18/2024     9:10 AM 12/30/2024     5:44 PM 4/1/2025    11:00 AM 4/9/2025     9:00 AM   PROMIS 10   In general, would you say your health is: Fair  Good Fair Fair  Fair   In general, would you say your quality of life is: Poor  Poor Fair Poor  Poor   In general, how would  you rate your physical health? Fair  Good Fair Fair  Fair   In general, how would you rate your mental health, including your mood and your ability to think? Fair  Fair Fair Poor  Fair   In general, how would you rate your satisfaction with your social activities and relationships? Poor  Poor Poor Poor  Poor   In general, please rate how well you carry out your usual social activities and roles Fair  Poor Poor Poor  Poor   To what extent are you able to carry out your everyday physical activities such as walking, climbing stairs, carrying groceries, or moving a chair? Moderately  Moderately Moderately Moderately  Moderately   In the past 7 days, how often have you been bothered by emotional problems such as feeling anxious, depressed, or irritable? Often  Often Often Often  Often   In the past 7 days, how would you rate your fatigue on average? Moderate  Moderate Moderate Moderate  Moderate   In the past 7 days, how would you rate your pain on average, where 0 means no pain, and 10 means worst imaginable pain? 7  8 7 8  8   In general, would you say your health is: 2  3 2 2 3 2   In general, would you say your quality of life is: 1  1 2 1 1 1   In general, how would you rate your physical health? 2  3 2 2 2 2   In general, how would you rate your mental health, including your mood and your ability to think? 2  2 2 1 2 2   In general, how would you rate your satisfaction with your social activities and relationships? 1  1 1 1 2 1   In general, please rate how well you carry out your usual social activities and roles. (This includes activities at home, at work and in your community, and responsibilities as a parent, child, spouse, employee, friend, etc.) 2  1 1 1 1 1   To what extent are you able to carry out your everyday physical activities such as walking, climbing stairs, carrying groceries, or moving a chair? 3  3 3 3 3 3   In the past 7 days, how often have you been bothered by emotional problems such as feeling  anxious, depressed, or irritable? 4  4 4 4 4 4   In the past 7 days, how would you rate your fatigue on average? 3  3 3 3 3 3   In the past 7 days, how would you rate your pain on average, where 0 means no pain, and 10 means worst imaginable pain? 7  8 7 8 8 8   Global Mental Health Score 6   6  7  5  7  6    Global Physical Health Score 10   11  10  10  10  10    PROMIS TOTAL - SUBSCORES 16   17  17  15  17  16        Information is confidential and restricted. Go to Review Flowsheets to unlock data.    Patient-reported     [place] Suicide Severity Rating Scale (Lifetime/Recent)      1/9/2023     1:00 PM 5/21/2024     4:49 PM 5/21/2024     9:00 PM 6/6/2024     8:00 AM 7/10/2024    12:00 PM 11/22/2024    11:05 AM 2/26/2025     5:59 PM   [place] Suicide Severity Rating (Lifetime/Recent)   Q1 Wish to be Dead (Lifetime)   Yes       Comments   OD on medications       Q2 Non-Specific Active Suicidal Thoughts (Lifetime)   No       Most Severe Ideation Rating (Lifetime)   5       Most Severe Ideation Description (Lifetime)   OD on medication       Frequency (Lifetime)   3       Duration (Lifetime)   3       Controllability (Lifetime)   2       Protective Factors  (Lifetime)   4       Reasons for Ideation (Lifetime)   5       Q1 Wished to be Dead (Past Month) yes 1-->yes 1-->yes 1-->yes  0-->no 0-->no   Q2 Suicidal Thoughts (Past Month) no 1-->yes 1-->yes 1-->yes  0-->no 0-->no   Q3 Suicidal Thought Method no 0-->no 0-->no 1-->yes      Q4 Suicidal Intent without Specific Plan no 1-->yes 0-->no 0-->no      Q5 Suicide Intent with Specific Plan no 0-->no 0-->no 1-->yes      Q6 Suicide Behavior (Lifetime) yes 1-->yes 0-->no 1-->yes  0-->no 0-->no   If yes to Q6, within past 3 months? no 1-->yes 0-->no 0-->no      Level of Risk per Screen moderate risk high risk moderate risk high risk  no risks indicated no risks indicated   1. Wish to be Dead (Lifetime)     N     2. Non-Specific Active Suicidal Thoughts (Lifetime)     N            ASSESSMENT: Current Emotional / Mental Status (status of significant symptoms):   Risk status (Self / Other harm or suicidal ideation)   Patient denies current fears or concerns for personal safety.   Patient denies current or recent suicidal ideation or behaviors.   Patient denies current or recent homicidal ideation or behaviors.   Patient denies current or recent self injurious behavior or ideation.   Patient denies other safety concerns.   Patient reports there has been no change in risk factors since their last session.     Patient reports there has been no change in protective factors since their last session.     A safety and risk management plan has been developed including: Patient consented to co-developed safety plan on 6/4/24.  Safety and risk management plan was reviewed.   Patient agreed to use safety plan should any safety concerns arise.  A copy was made available to the patient.     Appearance:   Appropriate    Eye Contact:   Good    Psychomotor Behavior: Normal  Agitated    Attitude:   Cooperative  Friendly   Orientation:   All   Speech    Rate / Production: Talkative    Volume:  Normal    Mood:    Anxious  Sad    Affect:    Tearful   Thought Content:  Clear    Thought Form:  Goal Directed    Insight:    Fair      Medication Review:   No changes to current psychiatric medication(s)     Medication Compliance:   Yes     Changes in Health Issues:   None reported     Chemical Use Review:   Substance Use: Chemical use reviewed, no active concerns identified      Tobacco Use: No current tobacco use.      Diagnosis:  1. 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 Generalized anxiety disorder        Collateral Reports Completed:   Not Applicable    PLAN: (Patient Tasks / Therapist Tasks / Other)  Continue following safety plan.  Focus on distress tolerance and self-care.        EWELINA Billingsley                                                          ______________________________________________________________________    Individual Treatment Plan    Patient's Name: Randi Cleary  YOB: 1955    Date of Creation: 7/17/2024  Date Treatment Plan Last Reviewed/Revised: 7/17/2024, 10/30/24, 2/3/2025    DSM5 Diagnoses:   296.33 (F33.2) Major Depressive Disorder, Recurrent Episode, Severe   300.02 (F41.1) Generalized Anxiety Disorder  Psychosocial / Contextual Factors: history of trauma  PROMIS (reviewed every 90 days):     Referral / Collaboration:  Discussed and patient will pursue a therapy group for depressive symptoms.    Anticipated number of session for this episode of care: 9-12 sessions  Anticipation frequency of session: Weekly  Anticipated Duration of each session: 38-52 minutes  Treatment plan will be reviewed in 90 days or when goals have been changed.       MeasurableTreatment Goal(s) related to diagnosis / functional impairment(s)  Goal 1: Client will keep self safe and eliminate suicidal ideation and self-harm behaviors.    Objective #A (Client Action)    Client will make a list of at least 10 skills or activities that you will to use to distract from urges to harm self.  Status: Completed - Date: 10/30/24       Intervention(s)  Therapist will provide ideas and strategies for generating coping skills list.    Objective #B  Client will make a list of pros and cons for tolerating and not tolerating an urge to harm self.  Status: Continued - Date(s): 2/3/2025    Intervention(s)  Therapist will guide client through DBT-style Pros/Cons list for behavior change.    Objective #C  Client will identify and practice the new strategies for dealing with strong emotions, learn and practice relaxation breathing.  Status: Continued - Date(s): 4/09/25      Intervention(s)  Therapist will teach distraction skills. Practice them within session and outside of session.    Goal 2: Client will report reduction in anxiety also as evidenced by reduction  of CHAVO-7 score below 6 points within the next 12 weeks.    Objective #A (Client Action)    Client will identify at least three triggers for anxiety.  Status: Completed - Date: 10/30/24       Intervention(s)  Therapist will provide educational materials on common triggers and signs of anxiety.    Objective #B  Client will use relaxation strategies at least two times per day to reduce the physical symptoms of anxiety.  Status: Continued - Date(s): 2/3/2025    Intervention(s)  Therapist will teach relaxation strategies such as mindfulness, deep breathing, muscle relaxation, and sensory activities.    Objective #C  Client will use cognitive strategies identified in therapy to challenge anxious thoughts.  Status: Continued - Date(s): 2/3/2025    Intervention(s)  Therapist will teach strategies for cognitive modification using REBT model.    Goal 3: Client will report improved mood as indicated by PHQ-9 score below 6 for consistent 8 weeks.    Objective #A (Client Action)    Status: Continued - Date: 2/3/2025    Client will Increase interest, engagement, and pleasure in doing things.    Intervention(s)  Therapist will assign homework for daily involvement in pleasant activities.    Objective #B  Client will Identify negative self-talk and behaviors: challenge core beliefs, myths, and actions.    Status: Continued - Date(s): 2/3/2025    Intervention(s)  Therapist will teach strategies for cognitive modification using REBT models.    Objective #C  Client will Improve quantity and quality of night time sleep / decrease daytime naps.  Status: Continued - Date(s): 2/3/2025    Intervention(s)  Therapist will teach sleep hygiene strategies.  Assign homework for daily practice.    Goal 4: Client will report reduced or eliminated physiological activation when recalling a distressing event including decreased re-experiencing, decreased avoidance, and decreased hyperarousal.    Objective #A (Client Action)    Status:  Continued:  2/3/25       Client will acquire knowledge of trauma, common symptoms post-trauma, emotion regulation strategies, stress management strategies, and cognitive coping strategies.    Intervention(s)  Therapist will teach about effects of trauma on mind and body; encourage emotion identification and expression; practice with client the stress management strategies; and teach cognitive modification.    Objective #B  Client will use Brainspotting while focusing on physiological sensations related to distressing events.  Client will assess and rate level of physiological activation.  Status: Continued - Date(s): 2/3/2025    Intervention(s)  Therapist will provide use a pointer or other materials to assist client in holding a brainspot in their visual field.  Therapist might also provide biolateral music through headphones to deepen the experience.         Patient has reviewed and agreed to the above plan.      Erika Colorado, LMFT  July 17, 2024

## 2025-04-09 NOTE — TELEPHONE ENCOUNTER
"  General Call      Reason for Call:  inhalers are too expensive.     What are your questions or concerns:  inhalers are too expensive. She doesn't know what to do. She called insurance and they told her she has a $600 deductible for medication which she doesn't believe is true. She talks extensively about her bills and the amount of money that she needs to pay for medical things including COPD medication. She needs helps and \"doesn't know what to do\"     Transferred to CHRISTINA Baum, with care coordination group.   Date of last appointment with provider:     Could we send this information to you in [facility] or would you prefer to receive a phone call?:   phone [phone]    "

## 2025-04-09 NOTE — PROGRESS NOTES
"Follow Up Progress Note      Assessment: [facility] RN spoke with patient today to follow up on goal and to discuss any needs or concerns. Patient reported she feels the ECT treatments are keeping her mental health stable. She said she is currently receiving maintenance ECTs every three weeks. Patient stated, \"I don't know what I would do with out them\".   Patient expressed frustration regarding cost of Breo Ellipta inhaler ordered by her PCP. She said she was informed by the pharmacist she would have to pay a $600 deductible in order to receive this medication. Patient asked writer if he could send a note to her PCP to see if he would send an alternative to the Breo Ellipta, Fluticasone, Propionate/Salmeterol 250/50 Inhaler. Writer will forward this request to PCP. Writer will additionally send referral to [facility] to determine if she is eligible for any programs to help her pay for the Brio Ellipta inhaler in the future. Patient denied any additional needs or concerns.     Care Gaps:    Health Maintenance Due   Topic Date Due    HF ACTION PLAN  Never done    DIABETIC FOOT EXAM  Never done    COPD ACTION PLAN  Never done    HEPATITIS A IMMUNIZATION (1 of 2 - Risk 2-dose series) Never done    ZOSTER IMMUNIZATION (1 of 2) Never done    MEDICARE ANNUAL WELLNESS VISIT  Never done    COLORECTAL CANCER SCREENING  02/25/2024    COVID-19 Vaccine (9 - 2024-25 season) 04/09/2025       Patient accepted scheduling phone number for PCP  to schedule independently     Care Plans  Care Plan: Mental Health       Problem: Mental Health Symptoms Need Improvement       Long-Range Goal: I would like to feel as though my mental health has improved.       Start Date: 10/16/2024 Expected End Date: 10/15/2025    This Visit's Progress: 60% Recent Progress: 50%    Priority: High    Note:     Barriers: history of anxiety, bipolar disorder, trauma related disorder  Strengths: strong advocate for herself  Patient expressed understanding of goal: " yes  Action steps to achieve this goal:  1. I will continue to attend all appointments with my therapist, with my psychiatric provider and attend all ECT treatments.   2. I continue to attend my group therapy appointments.   3. I will continue to the use the skills I have learned through therapy, partial hospitalization program and therapy group.   4. I will take my medications as directed.   5. I will report progress towards this goal at schedule outreach telephone calls from my Care Coordinator.     Discussed on 4/9/25                              Intervention/Education provided during outreach: Discussed the importance of taking her medications as directed. Encouraged her to attend all upcoming appointments. Encouraged her to contact Care Coordination for any additional needs or concerns.      Outreach Frequency: monthly, more frequently as needed    Plan: CCC RN will continue to monitor, support patient with current goal and will be available to assist as nursing needs arise.     Care Coordinator will follow up in one month.

## 2025-04-10 ENCOUNTER — PATIENT OUTREACH (OUTPATIENT)
Dept: INTERNAL MEDICINE | Facility: CLINIC | Age: 70
End: 2025-04-10
Payer: MEDICARE

## 2025-04-10 NOTE — TELEPHONE ENCOUNTER
Patient Quality Outreach    Patient is due for the following:   Physical Annual Wellness Visit    Action(s) Taken:   Schedule a Annual Wellness Visit    Type of outreach:    Sent [employer] message.    Questions for provider review:    None         Alyssa Dahl MA  Chart routed to None.

## 2025-04-10 NOTE — LETTER
April 10, 2025      Randi Cleary  [address]  [place] [address]      Your healthcare team cares about your health. To provide you with the best care, we have reviewed your chart and based on our findings, we see that you are due to:     PREVENTATIVE VISIT: Annual Medicare Wellness:Schedule an Annual Medicare Wellness Exam. Please call your [facility] to set up your appointment.    If you have already completed these items, please contact the clinic via phone or Mychart so your care team can review and update your records.  Thank you for choosing [facility] for your healthcare needs. For any questions, concerns, or to schedule an appointment please contact the clinic.     Healthy Regards,    Your [facility] Team

## 2025-04-21 ENCOUNTER — VIRTUAL VISIT (OUTPATIENT)
Dept: PSYCHOLOGY | Facility: CLINIC | Age: 70
End: 2025-04-21
Payer: MEDICARE

## 2025-04-21 DIAGNOSIS — F33.2 MAJOR DEPRESSIVE DISORDER, RECURRENT SEVERE WITHOUT PSYCHOTIC FEATURES (H): Primary | ICD-10-CM

## 2025-04-21 DIAGNOSIS — F41.1 GENERALIZED ANXIETY DISORDER: ICD-10-CM

## 2025-04-21 PROCEDURE — 90837 PSYTX W PT 60 MINUTES: CPT | Mod: 95 | Performed by: MARRIAGE & FAMILY THERAPIST

## 2025-04-21 NOTE — PROGRESS NOTES
[facility]                                     Progress Note    Patient Name: Randi Cleary  Date: 2025         Service Type: Individual      Session Start Time: 12:00  Session End Time: 12:57     Session Length: 53+    Session #: 26    Attendees: Client    Service Modality:  Video Visit:      Provider verified identity through the following two step process.  Patient provided:  Patient  and Patient is known previously to provider    Telemedicine Visit: The patient's condition can be safely assessed and treated via synchronous audio and visual telemedicine encounter.      Reason for Telemedicine Visit: Patient has requested telehealth visit    Originating Site (Patient Location): Patient's home    Distant Site (Provider Location): Provider Remote Setting- Home Office    Consent:  The patient/guardian has verbally consented to: the potential risks and benefits of telemedicine (video visit) versus in person care; bill my insurance or make self-payment for services provided; and responsibility for payment of non-covered services.     Patient would like the video invitation sent by:  My Chart    Mode of Communication:  Video Conference via [employer]well    Distant Location (Provider):  Off-site    As the provider I attest to compliance with applicable laws and regulations related to telemedicine.        DATA  Interactive Complexity: No  Crisis: No        Progress Since Last Session (Related to Symptoms / Goals / Homework):   Symptoms: No change      Homework:  n/a      Episode of Care Goals: Satisfactory progress - ACTION (Actively working towards change); Intervened by reinforcing change plan / affirming steps taken     Current / Ongoing Stressors and Concerns:   Reports experience recently of intense physiological discomfort, restless legs, felt sense of wanting to move her body or be released of pressure.       Treatment Objective(s) Addressed in This Session:   Distress  tolerance     Intervention:   Validation, safety assessment.  Encouragement.  Small steps toward larger goals.  Psychoeducation about trauma.        Assessments completed prior to visit:  The following assessments were completed by patient for this visit:  PHQ9:       2/17/2025     9:49 AM 2/24/2025     9:43 AM 3/3/2025    10:28 AM 3/5/2025    11:44 AM 3/11/2025     8:36 PM 4/18/2025    10:11 AM 4/21/2025    10:36 AM   PHQ-9 SCORE   PHQ-9 Total Score MyChart 16 (Moderately severe depression) 15 (Moderately severe depression) 15 (Moderately severe depression) 15 (Moderately severe depression) 14 (Moderate depression) 16 (Moderately severe depression) 16 (Moderately severe depression)   PHQ-9 Total Score 16  15  15  15  14  16  16        Patient-reported     GAD7:       11/4/2024     2:38 PM 12/3/2024     9:10 AM 12/17/2024     9:13 AM 1/23/2025     8:47 AM 2/10/2025    10:22 PM 3/3/2025    10:45 AM 4/9/2025     8:50 AM   CHAVO-7 SCORE   Total Score 10 (moderate anxiety) 17 (severe anxiety) 13 (moderate anxiety) 11 (moderate anxiety) 14 (moderate anxiety) 16 (severe anxiety) 12 (moderate anxiety)   Total Score 10  17  13  11  14  16  12        Patient-reported     CAGE-AID:       6/22/2020     7:12 PM 1/18/2022    11:15 PM 6/6/2024     8:00 AM   CAGE-AID Total Score   Total Score 4 4 4   Total Score MyChart 4 (A total score of 2 or greater is considered clinically significant) 4 (A total score of 2 or greater is considered clinically significant)      PROMIS 10-Global Health (all questions and answers displayed):       11/26/2024     5:00 PM 12/4/2024     9:28 AM 12/18/2024     9:10 AM 12/30/2024     5:44 PM 4/1/2025    11:00 AM 4/9/2025     9:00 AM 4/21/2025    10:39 AM   PROMIS 10   In general, would you say your health is:  Good Fair Fair  Fair Fair   In general, would you say your quality of life is:  Poor Fair Poor  Poor Poor   In general, how would you rate your physical health?  Good Fair Fair  Fair Poor   In  general, how would you rate your mental health, including your mood and your ability to think?  Fair Fair Poor  Fair Fair   In general, how would you rate your satisfaction with your social activities and relationships?  Poor Poor Poor  Poor Poor   In general, please rate how well you carry out your usual social activities and roles  Poor Poor Poor  Poor Poor   To what extent are you able to carry out your everyday physical activities such as walking, climbing stairs, carrying groceries, or moving a chair?  Moderately Moderately Moderately  Moderately Moderately   In the past 7 days, how often have you been bothered by emotional problems such as feeling anxious, depressed, or irritable?  Often Often Often  Often Often   In the past 7 days, how would you rate your fatigue on average?  Moderate Moderate Moderate  Moderate Moderate   In the past 7 days, how would you rate your pain on average, where 0 means no pain, and 10 means worst imaginable pain?  8 7 8  8 8   In general, would you say your health is:  3 2 2 3 2 2   In general, would you say your quality of life is:  1 2 1 1 1 1   In general, how would you rate your physical health?  3 2 2 2 2 1   In general, how would you rate your mental health, including your mood and your ability to think?  2 2 1 2 2 2   In general, how would you rate your satisfaction with your social activities and relationships?  1 1 1 2 1 1   In general, please rate how well you carry out your usual social activities and roles. (This includes activities at home, at work and in your community, and responsibilities as a parent, child, spouse, employee, friend, etc.)  1 1 1 1 1 1   To what extent are you able to carry out your everyday physical activities such as walking, climbing stairs, carrying groceries, or moving a chair?  3 3 3 3 3 3   In the past 7 days, how often have you been bothered by emotional problems such as feeling anxious, depressed, or irritable?  4 4 4 4 4 4   In the past 7  days, how would you rate your fatigue on average?  3 3 3 3 3 3   In the past 7 days, how would you rate your pain on average, where 0 means no pain, and 10 means worst imaginable pain?  8 7 8 8 8 8   Global Mental Health Score  6  7  5  7  6  6    Global Physical Health Score  11  10  10  10  10  9    PROMIS TOTAL - SUBSCORES  17  17  15  17  16  15        Information is confidential and restricted. Go to Review Flowsheets to unlock data.    Patient-reported     [place] Suicide Severity Rating Scale (Lifetime/Recent)      1/9/2023     1:00 PM 5/21/2024     4:49 PM 5/21/2024     9:00 PM 6/6/2024     8:00 AM 7/10/2024    12:00 PM 11/22/2024    11:05 AM 2/26/2025     5:59 PM   [place] Suicide Severity Rating (Lifetime/Recent)   Q1 Wish to be Dead (Lifetime)   Yes       Comments   OD on medications       Q2 Non-Specific Active Suicidal Thoughts (Lifetime)   No       Most Severe Ideation Rating (Lifetime)   5       Most Severe Ideation Description (Lifetime)   OD on medication       Frequency (Lifetime)   3       Duration (Lifetime)   3       Controllability (Lifetime)   2       Protective Factors  (Lifetime)   4       Reasons for Ideation (Lifetime)   5       Q1 Wished to be Dead (Past Month) yes 1-->yes 1-->yes 1-->yes  0-->no 0-->no   Q2 Suicidal Thoughts (Past Month) no 1-->yes 1-->yes 1-->yes  0-->no 0-->no   Q3 Suicidal Thought Method no 0-->no 0-->no 1-->yes      Q4 Suicidal Intent without Specific Plan no 1-->yes 0-->no 0-->no      Q5 Suicide Intent with Specific Plan no 0-->no 0-->no 1-->yes      Q6 Suicide Behavior (Lifetime) yes 1-->yes 0-->no 1-->yes  0-->no 0-->no   If yes to Q6, within past 3 months? no 1-->yes 0-->no 0-->no      Level of Risk per Screen moderate risk high risk moderate risk high risk  no risks indicated no risks indicated   1. Wish to be Dead (Lifetime)     N     2. Non-Specific Active Suicidal Thoughts (Lifetime)     N           ASSESSMENT: Current Emotional / Mental Status (status of  significant symptoms):   Risk status (Self / Other harm or suicidal ideation)   Patient denies current fears or concerns for personal safety.   Patient denies current or recent suicidal ideation or behaviors.   Patient denies current or recent homicidal ideation or behaviors.   Patient denies current or recent self injurious behavior or ideation.   Patient denies other safety concerns.   Patient reports there has been no change in risk factors since their last session.     Patient reports there has been no change in protective factors since their last session.     A safety and risk management plan has been developed including: Patient consented to co-developed safety plan on 6/4/24.  Safety and risk management plan was reviewed.   Patient agreed to use safety plan should any safety concerns arise.  A copy was made available to the patient.     Appearance:   Appropriate    Eye Contact:   Good    Psychomotor Behavior: Normal  Restless    Attitude:   Cooperative  Friendly   Orientation:   All   Speech    Rate / Production: Talkative    Volume:  Normal    Mood:    Anxious  Sad    Affect:    Subdued    Thought Content:  Clear    Thought Form:  Goal Directed    Insight:    Good      Medication Review:   No changes to current psychiatric medication(s)     Medication Compliance:   Yes     Changes in Health Issues:   None reported     Chemical Use Review:   Substance Use: Chemical use reviewed, no active concerns identified      Tobacco Use: No current tobacco use.      Diagnosis:  1. 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 Generalized anxiety disorder      Collateral Reports Completed:   Not Applicable    PLAN: (Patient Tasks / Therapist Tasks / Other)  Continue following safety plan.  Focus on distress tolerance and self-care.  Begin working on trauma history, likely starting with preverbal memory of breaking leg.      EWELINA Billingsley                                                          ______________________________________________________________________    Individual Treatment Plan    Patient's Name: Randi Cleary  YOB: 1955    Date of Creation: 7/17/2024  Date Treatment Plan Last Reviewed/Revised: 7/17/2024, 10/30/24, 2/3/2025    DSM5 Diagnoses:   296.33 (F33.2) Major Depressive Disorder, Recurrent Episode, Severe   300.02 (F41.1) Generalized Anxiety Disorder  Psychosocial / Contextual Factors: history of trauma  PROMIS (reviewed every 90 days):     Referral / Collaboration:  Discussed and patient will pursue a therapy group for depressive symptoms.    Anticipated number of session for this episode of care: 9-12 sessions  Anticipation frequency of session: Weekly  Anticipated Duration of each session: 38-52 minutes  Treatment plan will be reviewed in 90 days or when goals have been changed.       MeasurableTreatment Goal(s) related to diagnosis / functional impairment(s)  Goal 1: Client will keep self safe and eliminate suicidal ideation and self-harm behaviors.    Objective #A (Client Action)    Client will make a list of at least 10 skills or activities that you will to use to distract from urges to harm self.  Status: Completed - Date: 10/30/24       Intervention(s)  Therapist will provide ideas and strategies for generating coping skills list.    Objective #B  Client will make a list of pros and cons for tolerating and not tolerating an urge to harm self.  Status: Continued - Date(s): 2/3/2025    Intervention(s)  Therapist will guide client through DBT-style Pros/Cons list for behavior change.    Objective #C  Client will identify and practice the new strategies for dealing with strong emotions, learn and practice relaxation breathing.  Status: Continued - Date(s): 4/21/25      Intervention(s)  Therapist will teach distraction skills. Practice them within session and outside of session.    Goal 2: Client will report reduction in anxiety also as evidenced  by reduction of CHAVO-7 score below 6 points within the next 12 weeks.    Objective #A (Client Action)    Client will identify at least three triggers for anxiety.  Status: Completed - Date: 10/30/24       Intervention(s)  Therapist will provide educational materials on common triggers and signs of anxiety.    Objective #B  Client will use relaxation strategies at least two times per day to reduce the physical symptoms of anxiety.  Status: Continued - Date(s): 2/3/2025    Intervention(s)  Therapist will teach relaxation strategies such as mindfulness, deep breathing, muscle relaxation, and sensory activities.    Objective #C  Client will use cognitive strategies identified in therapy to challenge anxious thoughts.  Status: Continued - Date(s): 2/3/2025    Intervention(s)  Therapist will teach strategies for cognitive modification using REBT model.    Goal 3: Client will report improved mood as indicated by PHQ-9 score below 6 for consistent 8 weeks.    Objective #A (Client Action)    Status: Continued - Date: 2/3/2025    Client will Increase interest, engagement, and pleasure in doing things.    Intervention(s)  Therapist will assign homework for daily involvement in pleasant activities.    Objective #B  Client will Identify negative self-talk and behaviors: challenge core beliefs, myths, and actions.    Status: Continued - Date(s): 2/3/2025    Intervention(s)  Therapist will teach strategies for cognitive modification using REBT models.    Objective #C  Client will Improve quantity and quality of night time sleep / decrease daytime naps.  Status: Continued - Date(s): 2/3/2025    Intervention(s)  Therapist will teach sleep hygiene strategies.  Assign homework for daily practice.    Goal 4: Client will report reduced or eliminated physiological activation when recalling a distressing event including decreased re-experiencing, decreased avoidance, and decreased hyperarousal.    Objective #A (Client Action)    Status:   Continued: 2/3/25       Client will acquire knowledge of trauma, common symptoms post-trauma, emotion regulation strategies, stress management strategies, and cognitive coping strategies.    Intervention(s)  Therapist will teach about effects of trauma on mind and body; encourage emotion identification and expression; practice with client the stress management strategies; and teach cognitive modification.    Objective #B  Client will use Brainspotting while focusing on physiological sensations related to distressing events.  Client will assess and rate level of physiological activation.  Status: Continued - Date(s): 2/3/2025    Intervention(s)  Therapist will provide use a pointer or other materials to assist client in holding a brainspot in their visual field.  Therapist might also provide biolateral music through headphones to deepen the experience.         Patient has reviewed and agreed to the above plan.      Erika Colorado, LMFT  July 17, 2024

## 2025-04-23 ENCOUNTER — TELEPHONE (OUTPATIENT)
Dept: BEHAVIORAL HEALTH | Facility: CLINIC | Age: 70
End: 2025-04-23
Payer: MEDICARE

## 2025-04-23 ENCOUNTER — ANESTHESIA EVENT (OUTPATIENT)
Dept: BEHAVIORAL HEALTH | Facility: CLINIC | Age: 70
End: 2025-04-23

## 2025-04-23 ENCOUNTER — HOSPITAL ENCOUNTER (OUTPATIENT)
Dept: BEHAVIORAL HEALTH | Facility: CLINIC | Age: 70
Discharge: HOME | End: 2025-04-23
Attending: PSYCHIATRY & NEUROLOGY
Payer: MEDICARE

## 2025-04-23 ENCOUNTER — ANESTHESIA (OUTPATIENT)
Dept: BEHAVIORAL HEALTH | Facility: CLINIC | Age: 70
End: 2025-04-23
Payer: MEDICARE

## 2025-04-23 VITALS
OXYGEN SATURATION: 95 % | TEMPERATURE: 98.1 F | HEART RATE: 72 BPM | RESPIRATION RATE: 18 BRPM | DIASTOLIC BLOOD PRESSURE: 75 MMHG | SYSTOLIC BLOOD PRESSURE: 115 MMHG

## 2025-04-23 DIAGNOSIS — F33.2 SEVERE RECURRENT MAJOR DEPRESSION WITHOUT PSYCHOTIC FEATURES (H): Primary | ICD-10-CM

## 2025-04-23 PROCEDURE — 250N000009 HC RX 250: Performed by: STUDENT IN AN ORGANIZED HEALTH CARE EDUCATION/TRAINING PROGRAM

## 2025-04-23 PROCEDURE — 250N000011 HC RX IP 250 OP 636: Mod: JZ | Performed by: PSYCHIATRY & NEUROLOGY

## 2025-04-23 PROCEDURE — 250N000011 HC RX IP 250 OP 636: Performed by: STUDENT IN AN ORGANIZED HEALTH CARE EDUCATION/TRAINING PROGRAM

## 2025-04-23 PROCEDURE — 370N000017 HC ANESTHESIA TECHNICAL FEE, PER MIN: Performed by: STUDENT IN AN ORGANIZED HEALTH CARE EDUCATION/TRAINING PROGRAM

## 2025-04-23 PROCEDURE — 90870 ELECTROCONVULSIVE THERAPY: CPT

## 2025-04-23 PROCEDURE — 90870 ELECTROCONVULSIVE THERAPY: CPT | Performed by: STUDENT IN AN ORGANIZED HEALTH CARE EDUCATION/TRAINING PROGRAM

## 2025-04-23 RX ORDER — METHOHEXITAL IN WATER/PF 100MG/10ML
SYRINGE (ML) INTRAVENOUS PRN
Status: DISCONTINUED | OUTPATIENT
Start: 2025-04-23 | End: 2025-04-23

## 2025-04-23 RX ORDER — KETOROLAC TROMETHAMINE 30 MG/ML
30 INJECTION, SOLUTION INTRAMUSCULAR; INTRAVENOUS ONCE
Status: COMPLETED | OUTPATIENT
Start: 2025-04-23 | End: 2025-04-23

## 2025-04-23 RX ORDER — SODIUM CHLORIDE, SODIUM LACTATE, POTASSIUM CHLORIDE, CALCIUM CHLORIDE 600; 310; 30; 20 MG/100ML; MG/100ML; MG/100ML; MG/100ML
INJECTION, SOLUTION INTRAVENOUS CONTINUOUS
Status: DISCONTINUED | OUTPATIENT
Start: 2025-04-23 | End: 2025-04-24 | Stop reason: HOSPADM

## 2025-04-23 RX ORDER — DEXAMETHASONE SODIUM PHOSPHATE 4 MG/ML
4 INJECTION, SOLUTION INTRA-ARTICULAR; INTRALESIONAL; INTRAMUSCULAR; INTRAVENOUS; SOFT TISSUE
Status: DISCONTINUED | OUTPATIENT
Start: 2025-04-23 | End: 2025-04-24 | Stop reason: HOSPADM

## 2025-04-23 RX ORDER — ONDANSETRON 4 MG/1
4 TABLET, ORALLY DISINTEGRATING ORAL EVERY 30 MIN PRN
Status: DISCONTINUED | OUTPATIENT
Start: 2025-04-23 | End: 2025-04-24 | Stop reason: HOSPADM

## 2025-04-23 RX ORDER — NICARDIPINE HCL-0.9% SOD CHLOR 1 MG/10 ML
SYRINGE (ML) INTRAVENOUS PRN
Status: DISCONTINUED | OUTPATIENT
Start: 2025-04-23 | End: 2025-04-23

## 2025-04-23 RX ORDER — KETOROLAC TROMETHAMINE 30 MG/ML
30 INJECTION, SOLUTION INTRAMUSCULAR; INTRAVENOUS ONCE
Start: 2025-04-23 | End: 2025-04-23

## 2025-04-23 RX ORDER — LABETALOL HYDROCHLORIDE 5 MG/ML
INJECTION, SOLUTION INTRAVENOUS PRN
Status: DISCONTINUED | OUTPATIENT
Start: 2025-04-23 | End: 2025-04-23

## 2025-04-23 RX ORDER — ONDANSETRON 2 MG/ML
4 INJECTION INTRAMUSCULAR; INTRAVENOUS EVERY 30 MIN PRN
Status: DISCONTINUED | OUTPATIENT
Start: 2025-04-23 | End: 2025-04-24 | Stop reason: HOSPADM

## 2025-04-23 RX ORDER — NALOXONE HYDROCHLORIDE 0.4 MG/ML
0.1 INJECTION, SOLUTION INTRAMUSCULAR; INTRAVENOUS; SUBCUTANEOUS
Status: DISCONTINUED | OUTPATIENT
Start: 2025-04-23 | End: 2025-04-24 | Stop reason: HOSPADM

## 2025-04-23 RX ORDER — ESMOLOL HYDROCHLORIDE 10 MG/ML
INJECTION INTRAVENOUS PRN
Status: DISCONTINUED | OUTPATIENT
Start: 2025-04-23 | End: 2025-04-23

## 2025-04-23 RX ORDER — ACETAMINOPHEN 325 MG/1
975 TABLET ORAL ONCE
Status: DISCONTINUED | OUTPATIENT
Start: 2025-04-23 | End: 2025-04-24 | Stop reason: HOSPADM

## 2025-04-23 RX ADMIN — Medication 100 MG: at 11:54

## 2025-04-23 RX ADMIN — Medication 1000 MCG: at 11:52

## 2025-04-23 RX ADMIN — ESMOLOL HYDROCHLORIDE 80 MG: 10 INJECTION, SOLUTION INTRAVENOUS at 11:54

## 2025-04-23 RX ADMIN — LABETALOL HYDROCHLORIDE 10 MG: 5 INJECTION, SOLUTION INTRAVENOUS at 11:52

## 2025-04-23 RX ADMIN — KETOROLAC TROMETHAMINE 30 MG: 30 INJECTION, SOLUTION INTRAMUSCULAR at 11:50

## 2025-04-23 RX ADMIN — SUCCINYLCHOLINE CHLORIDE 90 MG: 20 INJECTION, SOLUTION INTRAMUSCULAR; INTRAVENOUS; PARENTERAL at 11:56

## 2025-04-23 ASSESSMENT — COPD QUESTIONNAIRES: COPD: 1

## 2025-04-23 NOTE — TELEPHONE ENCOUNTER
Winnie called and said she just started a new therapy called Axonies for bladder and bowel control. She has a temporary one right now to see how it works inserted into her butt under the skin. It sends electrical impulses to the nerves that control incontinence. She wanted to know if she could have ECT. I spoke with Dr. Riley who said it would be fine to have ECT.

## 2025-04-23 NOTE — PROGRESS NOTES
Pt has met discharge criteria.  VSS. PIV removed without any issue.  Pt assisted into wheelchair and brought to her husbands car for discharge home.
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
## 2025-04-23 NOTE — ANESTHESIA PREPROCEDURE EVALUATION
Anesthesia Pre-Procedure Evaluation    Patient: Randi Cleary   MRN: [id number] : 1955        Procedure :   Electroconvulsive Therapy       Past Medical History:   Diagnosis Date    Bipolar 2 disorder (H)     Breast cancer (H)     lumpectomy, radiation, chemo    Chronic pain syndrome     COPD (chronic obstructive pulmonary disease) (H)     asthma    Cord compression (H) 2021    Dizzy     Drug tolerance     opioid    Esophageal reflux     Fatigue     Generalized anxiety disorder     Graves disease 1994    Hemochromatosis 2018    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; H63D not detected    History of corticosteroid therapy 2019    History of partial adrenalectomy 2019    Hx antineoplastic chemotherapy     Hx of radiation therapy     Hyperlipidaemia     Hypertension     Impaired fasting glucose     Infection due to 2019 novel coronavirus 2021    Injury of neck, whiplash 07/15/2021    Joint pain     KYAW (obstructive sleep apnea) 2016    Osteopenia     Paroxysmal atrial fibrillation (H) 2024    Pheochromocytoma, left 2017    laparoscopically removed    Postablative hypothyroidism 1995    Prediabetes 10/03/2019    by A1c    Psoriasis     Psoriatic arthropathy (H)     Pulmonary hypertension (H)     Right rotator cuff tear     RLS (restless legs syndrome)     on ropinorole    Sacroiliitis     Serotonin syndrome 2020    [facility] - While on desvenlafaxine 100mg    Snoring     Spinal stenosis     Status post coronary angiogram 10/03/2019    Urinary incontinence     Vitamin B 12 deficiency     Vitamin D deficiency       Past Surgical History:   Procedure Laterality Date    ARTHRODESIS ANKLE      ARTHROPLASTY ANKLE Right 2015    Procedure: ARTHROPLASTY ANKLE;  Surgeon: Jason Coughlin MD;  Location: [facility]    ARTHROPLASTY REVISION ANKLE Right 2015    Procedure: ARTHROPLASTY REVISION ANKLE;  Surgeon: Jason Coughlin MD;  Location: [facility]    BIOPSY  BREAST      BREAST BIOPSY, CORE RT/LT      COLONOSCOPY      COLONOSCOPY N/A 2/25/2021    Procedure: COLONOSCOPY;  Surgeon: Guru Elke Tolbert MD;  Location:  GI    CV CORONARY ANGIOGRAM N/A 10/3/2019    Procedure: CV CORONARY ANGIOGRAM;  Surgeon: Bryce Pierre MD;  Location:  HEART CARDIAC CATH LAB    CV RIGHT HEART CATH MEASUREMENTS RECORDED N/A 10/3/2019    Procedure: CV RIGHT HEART CATH;  Surgeon: Bryce Pierre MD;  Location:  HEART CARDIAC CATH LAB    CV RIGHT HEART CATH MEASUREMENTS RECORDED N/A 9/25/2024    Procedure: Heart Cath Right Heart Cath;  Surgeon: George Becker MD;  Location:  HEART CARDIAC CATH LAB    ESOPHAGOSCOPY, GASTROSCOPY, DUODENOSCOPY (EGD), COMBINED N/A 2/25/2021    Procedure: ESOPHAGOGASTRODUODENOSCOPY, WITH BIOPSY;  Surgeon: Guru Elke Tolbert MD;  Location:  GI    EYE SURGERY  2021    HC REMOVE TONSILS/ADENOIDS,<11 Y/O      Description: Tonsillectomy With Adenoidectomy;  Recorded: 04/07/2010;    IR LUMBAR EPIDURAL STEROID INJECTION  10/26/2004    IR LUMBAR EPIDURAL STEROID INJECTION  11/16/2004    IR LUMBAR EPIDURAL STEROID INJECTION  12/21/2004    IR LUMBAR EPIDURAL STEROID INJECTION  6/8/2006    JOINT REPLACEMENT      LAMINOPLASTY CERVICAL POSTERIOR THREE+ LEVELS Left 12/21/2021    Procedure: CERVICAL 3-CERVICAL 6 LEFT OPEN DOOR LAMINOPLASTY AND LEFT CERVICAL 4-5 AND CERVICAL 6-7 POSTERIOR FORAMINOTOMY;  Surgeon: Angela Gregory MD;  Location: [facility] OR    LAPAROSCOPIC ADRENALECTOMY Left 08/02/2017    pheochromocytoma    LAPAROSCOPIC ADRENALECTOMY Left 8/2/2017    Procedure: LAPAROSCOPIC LEFT ADRENALECTOMY, ;  Surgeon: Gab Linares MD;  Location: [facility];  Service:     LENGTHEN TENDON ACHILLES Right 6/29/2015    Procedure: LENGTHEN TENDON ACHILLES;  Surgeon: Jason Coughlin MD;  Location: [facility]    LUMPECTOMY BREAST      LUMPECTOMY BREAST Left 1994    MAMMOPLASTY REDUCTION Right 01/13/2015     "[place]    MAMMOPLASTY REDUCTION Right     approx late /[id number]    MASTECTOMY      left lumpectomy with axillary node dissection    MASTECTOMY MODIFIED RADICAL      OTHER SURGICAL HISTORY Right     reconstructive breast surgery    OTHER SURGICAL HISTORY      Adrenalectomy for pheochromocytoma    TX MASTECTOMY, MODIFIED RADICAL      Description: Modified Radical Mastectomy Left Breast;  Recorded: 2010;    REPAIR HAMMER TOE Right 2015    Procedure: REPAIR HAMMER TOE;  Surgeon: Jason Coughlin MD;  Location: [facility]    TONSILLECTOMY      TONSILLECTOMY & ADENOIDECTOMY      ZC ARTHRODESIS,ANKLE,OPEN Right     Description: Ankle Arthrodesis;  Recorded: 2010;      Allergies   Allergen Reactions    Serotonin Reuptake Inhibitors (Ssris) Anxiety, Difficulty breathing, Headache, Palpitations and Shortness Of Breath    Buspirone      The patient states she had serotonin syndrome    Cephalexin      Other reaction(s): unknown rxn.    Desvenlafaxine      Serotonin syndrome    Diclofenac Sodium [Diclofenac]      Serotonin syndrome and restless legs syndrome    Gabapentin      Drove on the wrong side of the highway    Levofloxacin      \"CAN'T REMEMBER\"    Penicillins      \"SORES IN MOUTH\"    Riluzole Difficulty breathing and Swelling    Sulfa Antibiotics      Chronic cough in sulfa containing diuretic     Topiramate Other (See Comments)     Frequent urination    Dextromethorphan Anxiety      Social History     Tobacco Use    Smoking status: Former     Current packs/day: 0.00     Average packs/day: 2.5 packs/day for 29.2 years (72.9 ttl pk-yrs)     Types: Cigarettes     Start date: 1971     Quit date: 2000     Years since quittin.8     Passive exposure: Current    Smokeless tobacco: Never   Substance Use Topics    Alcohol use: Not Currently     Comment: relapse 2021 sober       Wt Readings from Last 1 Encounters:   25 79.8 kg (176 lb)        Anesthesia Evaluation   Pt has had prior " anesthetic. Type: General.    History of anesthetic complications       ROS/MED HX  ENT/Pulmonary:     (+) sleep apnea,                         COPD,              Neurologic:       Cardiovascular: Comment: pAfib & pHTN per chart    (+)  hypertension- -   -  - -                                pulmonary hypertension, Previous cardiac testing   Echo: Date: 11/2024 Results:  Global and regional left ventricular function is normal with an EF of 55-60%.  Right ventricular function, chamber size, wall motion, and thickness are normal.  No significant valvular abnormalities were noted.  This study was compared with the study from 10/5/23. No significant changes noted.  Stress Test:  Date: Results:    ECG Reviewed:  Date: Results:    Cath:  Date: Results:      METS/Exercise Tolerance:     Hematologic: Comments: Hemochromatosis       Musculoskeletal: Comment: Osteopenia  Sacral joint pain  Psoriatic arthritis  (+)  arthritis,             GI/Hepatic:     (+) GERD,     hiatal hernia,              Renal/Genitourinary:       Endo:     (+)          thyroid problem,     Obesity,       Psychiatric/Substance Use:     (+) psychiatric history bipolar       Infectious Disease:       Malignancy: Comment: Breast cancer      Other:            Physical Exam    Airway  airway exam normal      Mallampati: II   TM distance: > 3 FB   Neck ROM: full   Mouth opening: > 3 cm    Respiratory Devices and Support         Dental       (+) Modest Abnormalities - crowns, retainers, 1 or 2 missing teeth      Cardiovascular   cardiovascular exam normal          Pulmonary   pulmonary exam normal              OUTSIDE LABS:  CBC:   Lab Results   Component Value Date    WBC 5.8 02/25/2025    WBC 6.9 12/19/2024    HGB 16.8 (H) 02/25/2025    HGB 17.8 (H) 12/19/2024    HCT 48.7 (H) 02/25/2025    HCT 51.1 (H) 12/19/2024     02/25/2025     12/19/2024     BMP:   Lab Results   Component Value Date     02/25/2025     12/19/2024    POTASSIUM  4.5 02/25/2025    POTASSIUM 4.9 12/19/2024    CHLORIDE 104 02/25/2025    CHLORIDE 103 12/19/2024    CO2 27 02/25/2025    CO2 27 12/19/2024    BUN 15.4 02/25/2025    BUN 14.2 12/19/2024    CR 0.58 02/25/2025    CR 0.65 12/19/2024    GLC 76 02/25/2025    GLC 91 12/19/2024     COAGS:   Lab Results   Component Value Date    PTT 34 12/13/2021    INR 0.99 11/23/2024     POC:   Lab Results   Component Value Date     (H) 02/25/2021     HEPATIC:   Lab Results   Component Value Date    ALBUMIN 4.3 02/25/2025    PROTTOTAL 7.2 02/25/2025    ALT 17 02/25/2025    AST 23 02/25/2025    ALKPHOS 64 02/25/2025    BILITOTAL 0.4 02/25/2025     OTHER:   Lab Results   Component Value Date    A1C 5.4 02/12/2025    LINDA 9.4 02/25/2025    MAG 1.9 11/22/2024    TSH 0.50 02/12/2025    T4 1.49 02/12/2025    T3 114 01/18/2023    CRP <2.9 11/16/2021    SED 8 10/17/2023       Anesthesia Plan    ASA Status:  3    NPO Status:  NPO Appropriate    Anesthesia Type: General.     - Airway: Mask Only   Induction: Intravenous.           Consents    Anesthesia Plan(s) and associated risks, benefits, and realistic alternatives discussed. Questions answered and patient/representative(s) expressed understanding.     - Discussed:     - Discussed with:  Patient      - Extended Intubation/Ventilatory Support Discussed: No.      - Patient is DNR/DNI Status: No     Use of blood products discussed: No .     Postoperative Care    Pain management: Oral pain medications.   PONV prophylaxis: Ondansetron (or other 5HT-3)     Comments:               Thea Ford MD    Clinically Significant Risk Factors Present on Admission

## 2025-04-23 NOTE — DISCHARGE INSTRUCTIONS
ECT Discharge Instructions      During your ECT series:    Do not drive or work heavy equipment until 7 days after your last treatment.  Do not drink alcohol or use street drugs (illicit drugs) while you are having treatments.  Do not make important decisions, including legal decisions.    After each treatment:    Get plenty of rest. A responsible adult must stay with your for at least 6 hours.  Avoid heavy or risky activities for 24 hours while in maintenance ECT.   Do not drive for at least 24 hours after your treatment while in maintenance ECT.   If you have more than one treatment within one week, do not drive for 7 days after your last treatment.   If you feel light-headed, sit for a few minutes before standing. Have someone help you get up.  If you have nausea (feel sick to your stomach): Drink only clear liquids such as apple juice, ginger ale, broth or 7UP, Be sure to drink plenty of liquids. Move to a normal diet as you feel able.   If you received Toradol, wait 6 hours before taking ibuprofen.  Call your doctor if:   You have a fever over 100F (37.7 C) (taken under the tongue), or a fever that last more than 24 hours.  Your IV site is red, swollen, very painful or is getting more tender.  You have nausea that gets very bad or does not improve.    If you have any symptoms after ECT, tell our staff before your next treatment.  The ECT Department can be reached at [phone].  The ECT Department is open Mondays, Wednesdays and Fridays from 7:00 AM to 2:00 PM.    To speak to a doctor, call:  Your primary care provider or [facility] for Dr. Riley, Dr. Beavers, Dr. Hutchison or Dr. Cotter PHONE: [phone]; fax: [phone]    New instructions:  Your next ECT will be in 3 weeks on 5/14/25.    Your next ECT appointment will now be scheduled by a scheduling service. They should call you within 24 hours of your ECT appointment. For any urgent scheduling needs or to change your appointment, please  call the ECT department at [phone] and they will get in touch with scheduling for you.     You have received Toradol today at 11:50 AM. Toradol is an NSAID.  Do not take any NSAID's including: Ibuprofen, Naproxen Sodium, Asprin, Advil, Motrin, Aleve, Shannan, etc., until six hours after the above time which would be 5:50 AM. If you have any questions check back with your Physician, or pharmacist.     Covid-19 Testing:  We are no longer requiring covid tests prior to your procedure. If you are ill, please call the ECT department to discuss plan for rescheduling your appointment. [phone]    NEW: Please come to the [facility] entrance at [address] [place] [address] and check in at Room NB14. You will receive your wristband and stickers there and can then come down to the ECT department in the basement of the [facility].       After your appointment, you may be picked up at the [facility] entrance, which is located at [address] [place] [address].  [id number], about 1 block North of the [facility] entrance.    Transported by:   Sidney    Reviewed AVS discharge instructions with:   Sidney

## 2025-04-23 NOTE — ANESTHESIA CARE TRANSFER NOTE
Patient: Randi Cleary    Procedure: *   Electroconvulsive Therapy    Diagnosis: * No pre-op diagnosis entered *  Diagnosis Additional Information: No value filed.    Anesthesia Type:   General     Note:    Oropharynx: oropharynx clear of all foreign objects and spontaneously breathing  Level of Consciousness: drowsy  Oxygen Supplementation: room air    Independent Airway: airway patency satisfactory and stable  Dentition: dentition unchanged  Vital Signs Stable: post-procedure vital signs reviewed and stable  Report to RN Given: handoff report given  Patient transferred to: PACU    Handoff Report: Identifed the Patient, Identified the Reponsible Provider, Reviewed the pertinent medical history, Discussed the surgical course, Reviewed Intra-OP anesthesia mangement and issues during anesthesia, Set expectations for post-procedure period and Allowed opportunity for questions and acknowledgement of understanding      Vitals:  Vitals Value Taken Time   BP     Temp     Pulse     Resp     SpO2         Electronically Signed By: Thea Ford MD  April 23, 2025  12:13 PM

## 2025-04-24 NOTE — ANESTHESIA POSTPROCEDURE EVALUATION
Patient: Randi Cleary    Procedure: * No procedures listed *  Electroconvulsive Therapy    Anesthesia Type:  General    Note:  Disposition: Outpatient   Postop Pain Control: Uneventful            Sign Out: Well controlled pain   PONV: No   Neuro/Psych: Uneventful            Sign Out: Acceptable/Baseline neuro status   Airway/Respiratory: Uneventful            Sign Out: Acceptable/Baseline resp. status   CV/Hemodynamics: Uneventful            Sign Out: Acceptable CV status; No obvious hypovolemia; No obvious fluid overload   Other NRE: NONE   DID A NON-ROUTINE EVENT OCCUR? No           Last vitals:  Vitals:    04/23/25 1203 04/23/25 1215 04/23/25 1230   BP: 123/64 99/66 115/75   Pulse: 62 75 72   Resp: 18 17 18   Temp: 36.5  C (97.7  F) 36.8  C (98.2  F) 36.7  C (98.1  F)   SpO2: 93% 95% 95%       Electronically Signed By: Thea Ford MD  April 24, 2025  12:22 PM

## 2025-04-28 ENCOUNTER — MYC REFILL (OUTPATIENT)
Dept: PALLIATIVE MEDICINE | Facility: OTHER | Age: 70
End: 2025-04-28
Payer: MEDICARE

## 2025-04-28 DIAGNOSIS — M19.071 OSTEOARTHRITIS OF RIGHT ANKLE AND FOOT: ICD-10-CM

## 2025-04-28 RX ORDER — OXYCODONE HYDROCHLORIDE 5 MG/1
5 TABLET ORAL
Qty: 70 TABLET | Refills: 0 | Status: SHIPPED | OUTPATIENT
Start: 2025-04-28

## 2025-04-28 NOTE — TELEPHONE ENCOUNTER
Received request for a refill(s) of oxyCODONE (ROXICODONE) 5 MG tablet      Last dispensed from pharmacy on 04/07/25    Patient's last office/virtual visit by prescribing provider on 03/10/25  Next office/virtual appointment scheduled for 06/09/25    Last urine drug screen date 08/01/24  Current opioid agreement on file (completed within the last year) Yes Date of opioid agreement: 08/01/24    E-prescribe to     [id number] - [place], [place] - [address]  [address] [place] [address]  Phone: [phone] Fax: [phone]    Will route to nursing [place] for review and preparation of prescription(s).       Nevin Gunderson MA  [facility]

## 2025-04-28 NOTE — TELEPHONE ENCOUNTER
Refills have been requested for the following medications:         oxyCODONE (ROXICODONE) 5 MG tablet [AXEL WIGGINS]     Preferred pharmacy: [id number] [address] [facility]

## 2025-04-29 ENCOUNTER — VIRTUAL VISIT (OUTPATIENT)
Dept: PSYCHOLOGY | Facility: CLINIC | Age: 70
End: 2025-04-29
Payer: MEDICARE

## 2025-04-29 DIAGNOSIS — F33.2 MAJOR DEPRESSIVE DISORDER, RECURRENT SEVERE WITHOUT PSYCHOTIC FEATURES (H): Primary | ICD-10-CM

## 2025-04-29 DIAGNOSIS — F41.1 GENERALIZED ANXIETY DISORDER: ICD-10-CM

## 2025-04-29 PROCEDURE — 90837 PSYTX W PT 60 MINUTES: CPT | Mod: 95 | Performed by: MARRIAGE & FAMILY THERAPIST

## 2025-04-29 NOTE — PROGRESS NOTES
[facility]                                     Progress Note    Patient Name: Randi Cleary  Date: 2025         Service Type: Individual      Session Start Time:  7:30  Session End Time: 8:31     Session Length: 53+    Session #: 27    Attendees: Client    Service Modality:  Video Visit:      Provider verified identity through the following two step process.  Patient provided:  Patient  and Patient is known previously to provider    Telemedicine Visit: The patient's condition can be safely assessed and treated via synchronous audio and visual telemedicine encounter.      Reason for Telemedicine Visit: Patient has requested telehealth visit    Originating Site (Patient Location): Patient's home    Distant Site (Provider Location[facility]    Consent:  The patient/guardian has verbally consented to: the potential risks and benefits of telemedicine (video visit) versus in person care; bill my insurance or make self-payment for services provided; and responsibility for payment of non-covered services.     Patient would like the video invitation sent by:  My Chart    Mode of Communication:  Video Conference via Amwell    Distant Location (Provider):  On-site    As the provider I attest to compliance with applicable laws and regulations related to telemedicine.        DATA  Interactive Complexity: No  Crisis: No        Progress Since Last Session (Related to Symptoms / Goals / Homework):   Symptoms: Improving      Homework:  n/a      Episode of Care Goals: Satisfactory progress - ACTION (Actively working towards change); Intervened by reinforcing change plan / affirming steps taken     Current / Ongoing Stressors and Concerns:   Optimistic about medical procedure for urinary incontinence.  Positive results over the weekend.  Continued concern about state of country and future.  Seeking balance and making effective choices for  self.     Treatment Objective(s) Addressed in This Session:   Distress tolerance     Intervention:   Validation, safety assessment.  Encouragement.  Small steps toward larger goals. Cognitive modification.       Assessments completed prior to visit:  The following assessments were completed by patient for this visit:  PHQ9:       2/17/2025     9:49 AM 2/24/2025     9:43 AM 3/3/2025    10:28 AM 3/5/2025    11:44 AM 3/11/2025     8:36 PM 4/18/2025    10:11 AM 4/21/2025    10:36 AM   PHQ-9 SCORE   PHQ-9 Total Score MyChart 16 (Moderately severe depression) 15 (Moderately severe depression) 15 (Moderately severe depression) 15 (Moderately severe depression) 14 (Moderate depression) 16 (Moderately severe depression) 16 (Moderately severe depression)   PHQ-9 Total Score 16  15  15  15  14  16  16        Patient-reported     GAD7:       11/4/2024     2:38 PM 12/3/2024     9:10 AM 12/17/2024     9:13 AM 1/23/2025     8:47 AM 2/10/2025    10:22 PM 3/3/2025    10:45 AM 4/9/2025     8:50 AM   CHAVO-7 SCORE   Total Score 10 (moderate anxiety) 17 (severe anxiety) 13 (moderate anxiety) 11 (moderate anxiety) 14 (moderate anxiety) 16 (severe anxiety) 12 (moderate anxiety)   Total Score 10  17  13  11  14  16  12        Patient-reported     CAGE-AID:       6/22/2020     7:12 PM 1/18/2022    11:15 PM 6/6/2024     8:00 AM   CAGE-AID Total Score   Total Score 4 4 4   Total Score MyChart 4 (A total score of 2 or greater is considered clinically significant) 4 (A total score of 2 or greater is considered clinically significant)      PROMIS 10-Global Health (all questions and answers displayed):       11/26/2024     5:00 PM 12/4/2024     9:28 AM 12/18/2024     9:10 AM 12/30/2024     5:44 PM 4/1/2025    11:00 AM 4/9/2025     9:00 AM 4/21/2025    10:39 AM   PROMIS 10   In general, would you say your health is:  Good Fair Fair  Fair Fair   In general, would you say your quality of life is:  Poor Fair Poor  Poor Poor   In general, how would you  rate your physical health?  Good Fair Fair  Fair Poor   In general, how would you rate your mental health, including your mood and your ability to think?  Fair Fair Poor  Fair Fair   In general, how would you rate your satisfaction with your social activities and relationships?  Poor Poor Poor  Poor Poor   In general, please rate how well you carry out your usual social activities and roles  Poor Poor Poor  Poor Poor   To what extent are you able to carry out your everyday physical activities such as walking, climbing stairs, carrying groceries, or moving a chair?  Moderately Moderately Moderately  Moderately Moderately   In the past 7 days, how often have you been bothered by emotional problems such as feeling anxious, depressed, or irritable?  Often Often Often  Often Often   In the past 7 days, how would you rate your fatigue on average?  Moderate Moderate Moderate  Moderate Moderate   In the past 7 days, how would you rate your pain on average, where 0 means no pain, and 10 means worst imaginable pain?  8 7 8  8 8   In general, would you say your health is:  3 2 2 3 2 2   In general, would you say your quality of life is:  1 2 1 1 1 1   In general, how would you rate your physical health?  3 2 2 2 2 1   In general, how would you rate your mental health, including your mood and your ability to think?  2 2 1 2 2 2   In general, how would you rate your satisfaction with your social activities and relationships?  1 1 1 2 1 1   In general, please rate how well you carry out your usual social activities and roles. (This includes activities at home, at work and in your community, and responsibilities as a parent, child, spouse, employee, friend, etc.)  1 1 1 1 1 1   To what extent are you able to carry out your everyday physical activities such as walking, climbing stairs, carrying groceries, or moving a chair?  3 3 3 3 3 3   In the past 7 days, how often have you been bothered by emotional problems such as feeling  anxious, depressed, or irritable?  4 4 4 4 4 4   In the past 7 days, how would you rate your fatigue on average?  3 3 3 3 3 3   In the past 7 days, how would you rate your pain on average, where 0 means no pain, and 10 means worst imaginable pain?  8 7 8 8 8 8   Global Mental Health Score  6  7  5  7  6  6    Global Physical Health Score  11  10  10  10  10  9    PROMIS TOTAL - SUBSCORES  17  17  15  17  16  15        Information is confidential and restricted. Go to Review Flowsheets to unlock data.    Patient-reported     [place] Suicide Severity Rating Scale (Lifetime/Recent)      1/9/2023     1:00 PM 5/21/2024     4:49 PM 5/21/2024     9:00 PM 6/6/2024     8:00 AM 7/10/2024    12:00 PM 11/22/2024    11:05 AM 2/26/2025     5:59 PM   Sierra Suicide Severity Rating (Lifetime/Recent)   Q1 Wish to be Dead (Lifetime)   Yes       Comments   OD on medications       Q2 Non-Specific Active Suicidal Thoughts (Lifetime)   No       Most Severe Ideation Rating (Lifetime)   5       Most Severe Ideation Description (Lifetime)   OD on medication       Frequency (Lifetime)   3       Duration (Lifetime)   3       Controllability (Lifetime)   2       Protective Factors  (Lifetime)   4       Reasons for Ideation (Lifetime)   5       Q1 Wished to be Dead (Past Month) yes 1-->yes 1-->yes 1-->yes  0-->no 0-->no   Q2 Suicidal Thoughts (Past Month) no 1-->yes 1-->yes 1-->yes  0-->no 0-->no   Q3 Suicidal Thought Method no 0-->no 0-->no 1-->yes      Q4 Suicidal Intent without Specific Plan no 1-->yes 0-->no 0-->no      Q5 Suicide Intent with Specific Plan no 0-->no 0-->no 1-->yes      Q6 Suicide Behavior (Lifetime) yes 1-->yes 0-->no 1-->yes  0-->no 0-->no   If yes to Q6, within past 3 months? no 1-->yes 0-->no 0-->no      Level of Risk per Screen moderate risk high risk moderate risk high risk  no risks indicated no risks indicated   1. Wish to be Dead (Lifetime)     N     2. Non-Specific Active Suicidal Thoughts (Lifetime)     N            ASSESSMENT: Current Emotional / Mental Status (status of significant symptoms):   Risk status (Self / Other harm or suicidal ideation)   Patient denies current fears or concerns for personal safety.   Patient denies current or recent suicidal ideation or behaviors.   Patient denies current or recent homicidal ideation or behaviors.   Patient denies current or recent self injurious behavior or ideation.   Patient denies other safety concerns.   Patient reports there has been no change in risk factors since their last session.     Patient reports there has been no change in protective factors since their last session.     A safety and risk management plan has been developed including: Patient consented to co-developed safety plan on 6/4/24.  Safety and risk management plan was reviewed.   Patient agreed to use safety plan should any safety concerns arise.  A copy was made available to the patient.     Appearance:   Appropriate    Eye Contact:   Good    Psychomotor Behavior: Normal    Attitude:   Cooperative  Friendly   Orientation:   All   Speech    Rate / Production: Talkative    Volume:  Normal    Mood:    Anxious    Affect:    Appropriate  Bright    Thought Content:  Clear    Thought Form:  Goal Directed    Insight:    Good      Medication Review:   No changes to current psychiatric medication(s)     Medication Compliance:   Yes     Changes in Health Issues:   None reported     Chemical Use Review:   Substance Use: Chemical use reviewed, no active concerns identified      Tobacco Use: No current tobacco use.      Diagnosis:  1. 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 Generalized anxiety disorder        Collateral Reports Completed:   Not Applicable    PLAN: (Patient Tasks / Therapist Tasks / Other)  Continue following safety plan.  Focus on distress tolerance and self-care.  Eventual plan to start working on trauma history, likely starting with preverbal memory of  breaking leg.      Erika GOODWINAislinn JovelScottsboro, LMFT                                                         ______________________________________________________________________    Individual Treatment Plan    Patient's Name: Randi Cleary  YOB: 1955    Date of Creation: 7/17/2024  Date Treatment Plan Last Reviewed/Revised: 7/17/2024, 10/30/24, 2/3/2025    DSM5 Diagnoses:   296.33 (F33.2) Major Depressive Disorder, Recurrent Episode, Severe   300.02 (F41.1) Generalized Anxiety Disorder  Psychosocial / Contextual Factors: history of trauma  PROMIS (reviewed every 90 days):     Referral / Collaboration:  Discussed and patient will pursue a therapy group for depressive symptoms.    Anticipated number of session for this episode of care: 9-12 sessions  Anticipation frequency of session: Weekly  Anticipated Duration of each session: 38-52 minutes  Treatment plan will be reviewed in 90 days or when goals have been changed.       MeasurableTreatment Goal(s) related to diagnosis / functional impairment(s)  Goal 1: Client will keep self safe and eliminate suicidal ideation and self-harm behaviors.    Objective #A (Client Action)    Client will make a list of at least 10 skills or activities that you will to use to distract from urges to harm self.  Status: Completed - Date: 10/30/24       Intervention(s)  Therapist will provide ideas and strategies for generating coping skills list.    Objective #B  Client will make a list of pros and cons for tolerating and not tolerating an urge to harm self.  Status: Continued - Date(s): 2/3/2025    Intervention(s)  Therapist will guide client through DBT-style Pros/Cons list for behavior change.    Objective #C  Client will identify and practice the new strategies for dealing with strong emotions, learn and practice relaxation breathing.  Status: Continued - Date(s): 4/29/25      Intervention(s)  Therapist will teach distraction skills. Practice them within session and  outside of session.    Goal 2: Client will report reduction in anxiety also as evidenced by reduction of CHAVO-7 score below 6 points within the next 12 weeks.    Objective #A (Client Action)    Client will identify at least three triggers for anxiety.  Status: Completed - Date: 10/30/24       Intervention(s)  Therapist will provide educational materials on common triggers and signs of anxiety.    Objective #B  Client will use relaxation strategies at least two times per day to reduce the physical symptoms of anxiety.  Status: Continued - Date(s): 2/3/2025    Intervention(s)  Therapist will teach relaxation strategies such as mindfulness, deep breathing, muscle relaxation, and sensory activities.    Objective #C  Client will use cognitive strategies identified in therapy to challenge anxious thoughts.  Status: Continued - Date(s): 2/3/2025    Intervention(s)  Therapist will teach strategies for cognitive modification using REBT model.    Goal 3: Client will report improved mood as indicated by PHQ-9 score below 6 for consistent 8 weeks.    Objective #A (Client Action)    Status: Continued - Date: 2/3/2025    Client will Increase interest, engagement, and pleasure in doing things.    Intervention(s)  Therapist will assign homework for daily involvement in pleasant activities.    Objective #B  Client will Identify negative self-talk and behaviors: challenge core beliefs, myths, and actions.    Status: Continued - Date(s): 2/3/2025    Intervention(s)  Therapist will teach strategies for cognitive modification using REBT models.    Objective #C  Client will Improve quantity and quality of night time sleep / decrease daytime naps.  Status: Continued - Date(s): 2/3/2025    Intervention(s)  Therapist will teach sleep hygiene strategies.  Assign homework for daily practice.    Goal 4: Client will report reduced or eliminated physiological activation when recalling a distressing event including decreased re-experiencing,  decreased avoidance, and decreased hyperarousal.    Objective #A (Client Action)    Status:  Continued: 2/3/25       Client will acquire knowledge of trauma, common symptoms post-trauma, emotion regulation strategies, stress management strategies, and cognitive coping strategies.    Intervention(s)  Therapist will teach about effects of trauma on mind and body; encourage emotion identification and expression; practice with client the stress management strategies; and teach cognitive modification.    Objective #B  Client will use Brainspotting while focusing on physiological sensations related to distressing events.  Client will assess and rate level of physiological activation.  Status: Continued - Date(s): 2/3/2025    Intervention(s)  Therapist will provide use a pointer or other materials to assist client in holding a brainspot in their visual field.  Therapist might also provide biolateral music through headphones to deepen the experience.         Patient has reviewed and agreed to the above plan.      Erika [place], LMFT  July 17, 2024

## 2025-05-01 ENCOUNTER — OFFICE VISIT (OUTPATIENT)
Dept: INTERNAL MEDICINE | Facility: CLINIC | Age: 70
End: 2025-05-01
Payer: MEDICARE

## 2025-05-01 VITALS
WEIGHT: 179 LBS | SYSTOLIC BLOOD PRESSURE: 110 MMHG | HEIGHT: 65 IN | RESPIRATION RATE: 18 BRPM | DIASTOLIC BLOOD PRESSURE: 68 MMHG | OXYGEN SATURATION: 99 % | HEART RATE: 78 BPM | BODY MASS INDEX: 29.82 KG/M2 | TEMPERATURE: 98.7 F

## 2025-05-01 DIAGNOSIS — K21.00 GASTROESOPHAGEAL REFLUX DISEASE WITH ESOPHAGITIS WITHOUT HEMORRHAGE: ICD-10-CM

## 2025-05-01 DIAGNOSIS — I48.0 PAROXYSMAL ATRIAL FIBRILLATION (H): ICD-10-CM

## 2025-05-01 DIAGNOSIS — R73.03 PREDIABETES: ICD-10-CM

## 2025-05-01 DIAGNOSIS — F33.3 MAJOR DEPRESSIVE DISORDER, RECURRENT, SEVERE WITH PSYCHOTIC SYMPTOMS (H): ICD-10-CM

## 2025-05-01 DIAGNOSIS — E89.0 POSTABLATIVE HYPOTHYROIDISM: ICD-10-CM

## 2025-05-01 DIAGNOSIS — F11.90 CHRONIC, CONTINUOUS USE OF OPIOIDS: ICD-10-CM

## 2025-05-01 DIAGNOSIS — F41.1 GENERALIZED ANXIETY DISORDER: ICD-10-CM

## 2025-05-01 DIAGNOSIS — E83.110 HEREDITARY HEMOCHROMATOSIS: ICD-10-CM

## 2025-05-01 DIAGNOSIS — G47.33 OSA (OBSTRUCTIVE SLEEP APNEA): ICD-10-CM

## 2025-05-01 DIAGNOSIS — Z01.818 PREOPERATIVE EXAMINATION: Primary | ICD-10-CM

## 2025-05-01 DIAGNOSIS — J42 CHRONIC BRONCHITIS, UNSPECIFIED CHRONIC BRONCHITIS TYPE (H): ICD-10-CM

## 2025-05-01 DIAGNOSIS — F10.21 ALCOHOL USE DISORDER, MODERATE, IN SUSTAINED REMISSION (H): ICD-10-CM

## 2025-05-01 DIAGNOSIS — R32 URINARY INCONTINENCE, UNSPECIFIED TYPE: ICD-10-CM

## 2025-05-01 PROBLEM — E66.01 MORBID OBESITY (H): Status: RESOLVED | Noted: 2021-11-20 | Resolved: 2025-05-01

## 2025-05-01 PROBLEM — E66.812 CLASS 2 OBESITY DUE TO EXCESS CALORIES WITHOUT SERIOUS COMORBIDITY IN ADULT: Status: RESOLVED | Noted: 2019-11-19 | Resolved: 2025-05-01

## 2025-05-01 PROBLEM — E66.09 CLASS 2 OBESITY DUE TO EXCESS CALORIES WITHOUT SERIOUS COMORBIDITY IN ADULT: Status: RESOLVED | Noted: 2019-11-19 | Resolved: 2025-05-01

## 2025-05-01 LAB
ATRIAL RATE - MUSE: 73 BPM
DIASTOLIC BLOOD PRESSURE - MUSE: NORMAL MMHG
EST. AVERAGE GLUCOSE BLD GHB EST-MCNC: 126 MG/DL
HBA1C MFR BLD: 6 % (ref 0–5.6)
HGB BLD-MCNC: 15.9 G/DL (ref 11.7–15.7)
INTERPRETATION ECG - MUSE: NORMAL
P AXIS - MUSE: 61 DEGREES
POTASSIUM SERPL-SCNC: 5.3 MMOL/L (ref 3.4–5.3)
PR INTERVAL - MUSE: 182 MS
QRS DURATION - MUSE: 106 MS
QT - MUSE: 398 MS
QTC - MUSE: 438 MS
R AXIS - MUSE: 68 DEGREES
SYSTOLIC BLOOD PRESSURE - MUSE: NORMAL MMHG
T AXIS - MUSE: 39 DEGREES
VENTRICULAR RATE- MUSE: 73 BPM

## 2025-05-01 NOTE — PROGRESS NOTES
Preoperative Evaluation  [facility]  [address] [facility] [address]  Phone: [phone]  Fax: [phone]  Primary Provider: Kaushik Hobbs CNP  Pre-op Performing Provider: Kaushik Hobbs CNP  May 1, 2025             5/1/2025   Surgical Information   What procedure is being done? Incontenence stimulator   Facility or Hospital where procedure/surgery will be performed: [facility]   Who is doing the procedure / surgery? Dr. Mao   Date of surgery / procedure: Friday May 9th, 2025   Time of surgery / procedure: 10 a.m.   Where do you plan to recover after surgery? at home with family     Fax number for surgical facility: [phone]    Assessment & Plan     The proposed surgical procedure is considered LOW risk.    Preoperative examination  No contraindications for planned procedure.    EKG personally interpreted as sinus rhythm with right bundle branch block, no ST changes  - EKG 12-lead, tracing only    Urinary incontinence, unspecified type  She plans on having an incontinence device surgically implanted    Chronic bronchitis, unspecified chronic bronchitis type (H)  Stable on Advair    KYAW (obstructive sleep apnea)  She does have CPAP machine but is not using it    Gastroesophageal reflux disease with esophagitis without hemorrhage  Controlled on omeprazole    Hereditary hemochromatosis  Managed by hematology.  She has done therapeutic phlebotomies in the past but has not required any recently.    Postablative hypothyroidism  Historically stable dose Synthroid    Prediabetes  She is not on any diabetes medications    Paroxysmal atrial fibrillation (H)  She presented to undergo scheduled ECT this last November.  Anesthesia had found new onset atrial fibrillation.  She was admitted briefly and spontaneously converted to sinus rhythm.  She has not had any recurrence.  She is not anticoagulated    Alcohol use disorder, moderate, in sustained  remission (H)  In remission    Chronic, continuous use of opioids  Oxycodone 5 mg tablet 3-4 times daily for chronic pain    Generalized anxiety disorder  She has a complicated psychiatric history.  She is following with psychiatry and psychology and continues with ECT.    Major depressive disorder, recurrent, severe with psychotic symptoms (H)  As above    Patient Instructions   Hold all supplements, aspirin and NSAIDs for 7 days prior to surgery.     Follow your surgeon's direction on when to stop eating and drinking prior to surgery.    Your surgeon will be managing your pain after your surgery.      Remove all jewelry and metal piercings before your surgery.     Remove nail polish from fingers before surgery.    If you use a CPAP machine, bring this with you to surgery.      The longitudinal plan of care for the diagnosis(es)/condition(s) as documented were addressed during this visit. Due to the added complexity in care, I will continue to support Winnie in the subsequent management and with ongoing continuity of care.              - No identified additional risk factors other than previously addressed         Recommendation  Approval given to proceed with proposed procedure, without further diagnostic evaluation.        Reinier Zhou is a 69 year old, presenting for the following:  Pre-Op Exam (Incontinence Simulator )          5/1/2025    10:43 AM   Additional Questions   Roomed by Alyssa GOODWIN     HPI: As above         5/1/2025   Pre-Op Questionnaire   Have you ever had a heart attack or stroke? No   Have you ever had surgery on your heart or blood vessels, such as a stent placement, a coronary artery bypass, or surgery on an artery in your head, neck, heart, or legs? No   Do you have chest pain with activity? No   Do you have a history of heart failure? No   Do you currently have a cold, bronchitis or symptoms of other infection? No   Do you have a cough, shortness of breath, or wheezing? No   Do you or  anyone in your family have previous history of blood clots? (!) YES    Do you or does anyone in your family have a serious bleeding problem such as prolonged bleeding following surgeries or cuts? No   Have you ever had problems with anemia or been told to take iron pills? No   Have you had any abnormal blood loss such as black, tarry or bloody stools, or abnormal vaginal bleeding? No   Have you ever had a blood transfusion? No   Are you willing to have a blood transfusion if it is medically needed before, during, or after your surgery? Yes   Have you or any of your relatives ever had problems with anesthesia? No   Do you have sleep apnea, excessive snoring or daytime drowsiness? (!) UNKNOWN   Do you have any artifical heart valves or other implanted medical devices like a pacemaker, defibrillator, or continuous glucose monitor? No   Do you have artificial joints? (!) YES   Are you allergic to latex? No     Advance Care Planning    Discussed advance care planning with patient; however, patient declined at this time.    Preoperative Review of    reviewed - controlled substances reflected in medication list.          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     Priority: Medium    Chronic, continuous use of opioids 11/23/2024     Priority: Medium    Severe episode of recurrent major depressive disorder, without psychotic features (H) 11/23/2024     Priority: Medium    Status post electroconvulsive therapy 11/22/2024     Priority: Medium    PAF (paroxysmal atrial fibrillation) (H) 11/22/2024     Priority: Medium    MDD (major depressive  disorder), recurrent episode, severe (H) 07/01/2024     Priority: Medium    Severe recurrent major depression without psychotic features (H) 05/22/2024     Priority: Medium    Depression with anxiety 05/21/2024     Priority: Medium    Post-menopausal 01/03/2024     Priority: Medium    Trauma and stressor-related disorder 06/29/2023     Priority: Medium    Numbness in both hands 03/16/2023     Priority: Medium    Opioid type dependence, continuous (H) 03/16/2023     Priority: Medium    Primary osteoarthritis involving multiple joints 01/11/2023     Priority: Medium    Gastroesophageal reflux disease with esophagitis without hemorrhage 01/11/2023     Priority: Medium    Psoriatic arthropathy (H) 06/02/2022     Priority: Medium    Alcohol use disorder, moderate, in sustained remission (H) 02/23/2022     Priority: Medium    Cord compression (H) 12/21/2021     Priority: Medium    History of cervical spinal surgery 12/21/2021     Priority: Medium     Angela Gregory MD   12/21/2021  4:11 PM   Spinal stenosis in cervical region [M48.02]  Cervical radiculopathy [M54.12]  CERVICAL 3-CERVICAL 6 LEFT OPEN DOOR LAMINOPLASTY AND LEFT CERVICAL 4-5 AND CERVICAL 6-7 POSTERIOR FORAMINOTOMY        Cervical stenosis of spinal canal 12/21/2021     Priority: Medium     CT C spine 12/2022: CANAL/FORAMINA: Reversal normal lordotic curvature. Multilevel spondylosis result in various levels and degrees of central canal stenosis and neural foraminal stenosis. C6-C7 severe central canal stenosis. Multilevel severe neural foraminal stenosis at   left C2-C3, bilateral C3-C4, left C4-C5, bilateral C5-C6, bilateral C6-C7, right C7-T1, right T1-T2, right T2-T3, left T3-T4.  Multiple levels demonstrate mild degenerative grade 1 subluxations.     PARASPINAL: No significant extraspinal abnormality.                                                         IMPRESSION:   1.  No acute fracture.   2.  Left C3 C6 laminoplasty.   3.  Degenerative  changes described above.    MRI 5/1/23:   1. Surgical changes of left-sided open-door laminoplasty from C3 to  C6. No residual high-grade spinal canal stenosis. Mild spinal canal  stenosis is seen at the C6-7 level.  2. Multilevel neural foraminal stenosis detailed above. There is  moderate to severe right-sided neural foraminal stenosis at the C3-4.      Morbid obesity (H) 11/20/2021     Priority: Medium    Generalized anxiety disorder 07/15/2021     Priority: Medium    Restless leg syndrome 07/15/2021     Priority: Medium    Vitamin B12 deficiency 07/15/2021     Priority: Medium     Formatting of this note might be different from the original.  Created by Conversion      Vitamin D deficiency 07/15/2021     Priority: Medium     Formatting of this note might be different from the original.  Created by Conversion    Replacement Utility updated for latest IMO load      Hypokalemia 09/18/2020     Priority: Medium    Prediabetes      Priority: Medium    KYAW (obstructive sleep apnea) 12/11/2019     Priority: Medium    Postablative hypothyroidism 11/19/2019     Priority: Medium     Formatting of this note might be different from the original.  Created by Conversion    Replacement Utility updated for latest IMO load  Formatting of this note might be different from the original.  Created by Conversion    Replacement Utility updated for latest IMO load      History of pheochromocytoma 11/19/2019     Priority: Medium    Hx of corticosteroid therapy 11/19/2019     Priority: Medium    Class 2 obesity due to excess calories without serious comorbidity in adult 11/19/2019     Priority: Medium    Hereditary hemochromatosis      Priority: Medium    COPD (chronic obstructive pulmonary disease) (H) 12/02/2016     Priority: Medium     Created by Conversion        DJD (degenerative joint disease), ankle and foot 06/29/2015     Priority: Medium      Past Medical History:   Diagnosis Date    Bipolar 2 disorder (H)     Breast cancer (H)  1986    lumpectomy, radiation, chemo    Chronic pain syndrome     COPD (chronic obstructive pulmonary disease) (H)     asthma    Cord compression (H) 12/21/2021    Dizzy     Drug tolerance     opioid    Esophageal reflux     Fatigue     Generalized anxiety disorder     Graves disease 1994    Hemochromatosis 02/14/2018    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; H63D not detected    History of corticosteroid therapy 11/19/2019    History of partial adrenalectomy 11/19/2019    Hx antineoplastic chemotherapy     Hx of radiation therapy     Hyperlipidaemia     Hypertension     Impaired fasting glucose 2017    Infection due to 2019 novel coronavirus 06/04/2021    Injury of neck, whiplash 07/15/2021    Joint pain     KYAW (obstructive sleep apnea) 2016    Osteopenia     Paroxysmal atrial fibrillation (H) 11/2024    Pheochromocytoma, left 08/02/2017    laparoscopically removed    Postablative hypothyroidism 1995    Prediabetes 10/03/2019    by A1c    Psoriasis     Psoriatic arthropathy (H)     Pulmonary hypertension (H)     Right rotator cuff tear     RLS (restless legs syndrome)     on ropinorole    Sacroiliitis     Serotonin syndrome 08/28/2020    [facility] - While on desvenlafaxine 100mg    Snoring     Spinal stenosis     Status post coronary angiogram 10/03/2019    Urinary incontinence     Vitamin B 12 deficiency 2009    Vitamin D deficiency 2010     Past Surgical History:   Procedure Laterality Date    ARTHRODESIS ANKLE      ARTHROPLASTY ANKLE Right 6/29/2015    Procedure: ARTHROPLASTY ANKLE;  Surgeon: Jason Coughlin MD;  Location: [facility]    ARTHROPLASTY REVISION ANKLE Right 6/29/2015    Procedure: ARTHROPLASTY REVISION ANKLE;  Surgeon: Jason Coughlin MD;  Location: [facility]    BIOPSY BREAST      BREAST BIOPSY, CORE RT/LT      COLONOSCOPY      COLONOSCOPY N/A 2/25/2021    Procedure: COLONOSCOPY;  Surgeon: Guru Elke Tolbert MD;  Location: [facility]    CV CORONARY ANGIOGRAM N/A 10/3/2019    Procedure: CV CORONARY  ANGIOGRAM;  Surgeon: Bryce Pierre MD;  Location:  HEART CARDIAC CATH LAB    CV RIGHT HEART CATH MEASUREMENTS RECORDED N/A 10/3/2019    Procedure: CV RIGHT HEART CATH;  Surgeon: Bryce Pierre MD;  Location:  HEART CARDIAC CATH LAB    CV RIGHT HEART CATH MEASUREMENTS RECORDED N/A 9/25/2024    Procedure: Heart Cath Right Heart Cath;  Surgeon: George Becker MD;  Location:  HEART CARDIAC CATH LAB    ESOPHAGOSCOPY, GASTROSCOPY, DUODENOSCOPY (EGD), COMBINED N/A 2/25/2021    Procedure: ESOPHAGOGASTRODUODENOSCOPY, WITH BIOPSY;  Surgeon: Guru Elke Tolbert MD;  Location:  GI    EYE SURGERY  2021    HC REMOVE TONSILS/ADENOIDS,<11 Y/O      Description: Tonsillectomy With Adenoidectomy;  Recorded: 04/07/2010;    IR LUMBAR EPIDURAL STEROID INJECTION  10/26/2004    IR LUMBAR EPIDURAL STEROID INJECTION  11/16/2004    IR LUMBAR EPIDURAL STEROID INJECTION  12/21/2004    IR LUMBAR EPIDURAL STEROID INJECTION  6/8/2006    JOINT REPLACEMENT      LAMINOPLASTY CERVICAL POSTERIOR THREE+ LEVELS Left 12/21/2021    Procedure: CERVICAL 3-CERVICAL 6 LEFT OPEN DOOR LAMINOPLASTY AND LEFT CERVICAL 4-5 AND CERVICAL 6-7 POSTERIOR FORAMINOTOMY;  Surgeon: Angela Gregory MD;  Location: [facility]    LAPAROSCOPIC ADRENALECTOMY Left 08/02/2017    pheochromocytoma    LAPAROSCOPIC ADRENALECTOMY Left 8/2/2017    Procedure: LAPAROSCOPIC LEFT ADRENALECTOMY, ;  Surgeon: Gab Linares MD;  Location: [facility];  Service:     LENGTHEN TENDON ACHILLES Right 6/29/2015    Procedure: LENGTHEN TENDON ACHILLES;  Surgeon: Jason Coughlin MD;  Location: [facility]    LUMPECTOMY BREAST      LUMPECTOMY BREAST Left 1994    MAMMOPLASTY REDUCTION Right 01/13/2015    [place]    MAMMOPLASTY REDUCTION Right     approx late 2015/early2016    MASTECTOMY      left lumpectomy with axillary node dissection    MASTECTOMY MODIFIED RADICAL      OTHER SURGICAL HISTORY Right     reconstructive breast surgery    OTHER  SURGICAL HISTORY      Adrenalectomy for pheochromocytoma    IL MASTECTOMY, MODIFIED RADICAL      Description: Modified Radical Mastectomy Left Breast;  Recorded: 04/07/2010;    REPAIR HAMMER TOE Right 6/29/2015    Procedure: REPAIR HAMMER TOE;  Surgeon: Jason Coughlin MD;  Location: [facility]    TONSILLECTOMY      TONSILLECTOMY & ADENOIDECTOMY      [facility] ARTHRODESIS,ANKLE,OPEN Right     Description: Ankle Arthrodesis;  Recorded: 04/07/2010;     Current Outpatient Medications   Medication Sig Dispense Refill    fluticasone-salmeterol (ADVAIR) 250-50 MCG/ACT inhaler Inhale 1 puff into the lungs every 12 hours. 60 each 2    acetaminophen (TYLENOL) 325 MG tablet Take 325-650 mg by mouth every 6 hours as needed for mild pain.      albuterol (VENTOLIN HFA) 108 (90 Base) MCG/ACT inhaler Inhale 2 puffs into the lungs every 6 hours as needed for shortness of breath, wheezing or cough 18 g 11    benzonatate (TESSALON) 100 MG capsule Take 1 capsule (100 mg) by mouth 3 times daily as needed for cough. 90 capsule 1    Cyanocobalamin (VITAMIN B-12) 5000 MCG SUBL Place 2-3 sprays under the tongue every morning. Unknown dose. 2 or 3 sprays/day      ethacrynic acid (EDECRIN) 25 MG tablet Take 1 tablet (25 mg) by mouth daily. 30 tablet 0    fluticasone-vilanterol (BREO ELLIPTA) 100-25 MCG/ACT inhaler Inhale 1 puff into the lungs daily. 60 each 3    gabapentin (NEURONTIN) 100 MG capsule Take 1-2 capsules (100-200 mg) by mouth at bedtime. May also take 1-2 capsules (100-200 mg) daily as needed for other (anxiety). Do not use the night before ECT.. 120 capsule 2    guaiFENesin (MUCINEX) 600 MG 12 hr tablet Take 600 mg by mouth every morning.      ketoconazole (NIZORAL) 2 % external shampoo Apply topically daily as needed.      KLOR-CON M20 20 MEQ CR tablet Take 1 tablet (20 mEq) by mouth every morning. 90 tablet 3    Lavender Oil 80 MG CAPS Take 160 mg by mouth at bedtime.      MAGNESIUM PO Take 2 capsules by mouth 2 times daily.  "Strength unknown      medical cannabis (Patient's own supply) See Admin Instructions (The purpose of this order is to document that the patient reports taking medical cannabis.  This is not a prescription, and is not used to certify that the patient has a qualifying medical condition.)  Flower      melatonin 1 MG CAPS Take 1 mg by mouth at bedtime.      naloxone (NARCAN) 4 MG/0.1ML nasal spray Spray 1 spray (4 mg) into one nostril alternating nostrils as needed for opioid reversal every 2-3 minutes until assistance arrives 0.2 mL 0    omeprazole (PRILOSEC) 20 MG DR capsule Take 1 capsule (20 mg) by mouth as needed. 90 capsule 3    oxyCODONE (ROXICODONE) 5 MG tablet Take 1 tablet (5 mg) by mouth 5 times daily. May dispense/start 4/28/25 70 tablet 0    rOPINIRole (REQUIP) 2 MG tablet Take 1 tablet (2 mg) by mouth 3 times daily. 270 tablet 3    STATIN NOT PRESCRIBED (INTENTIONAL) Please choose reason not prescribed from choices below.      SYNTHROID 150 MCG tablet Take 1 tablet (150 mcg) by mouth every morning. MON to SAT 1 tablet/day; SUN 0.5 tablet - 90 tablet 4    UNABLE TO FIND Take 1 tablet by mouth every morning. MEDICATION NAME: CETYL-MYRISFOLEATE      vitamin C (ASCORBIC ACID) 1000 MG TABS Take 1,000 mg by mouth every morning.      vitamin D3 (CHOLECALCIFEROL) 250 mcg (26734 units) capsule Take 1 capsule by mouth once a week. SUNDAY'S         Allergies   Allergen Reactions    Serotonin Reuptake Inhibitors (Ssris) Anxiety, Difficulty breathing, Headache, Palpitations and Shortness Of Breath    Bupropion Unknown    Buspirone      The patient states she had serotonin syndrome    Cephalexin      Other reaction(s): unknown rxn.    Desvenlafaxine      Serotonin syndrome    Diclofenac Sodium [Diclofenac]      Serotonin syndrome and restless legs syndrome    Gabapentin      Drove on the wrong side of the highway    Levofloxacin      \"CAN'T REMEMBER\"    Penicillins      \"SORES IN MOUTH\"    Riluzole Difficulty breathing " "and Swelling    Riluzole Unknown    Sulfa Antibiotics      Chronic cough in sulfa containing diuretic     Topiramate Other (See Comments)     Frequent urination    Dextromethorphan Anxiety        Social History     Tobacco Use    Smoking status: Former     Current packs/day: 0.00     Average packs/day: 2.5 packs/day for 29.2 years (72.9 ttl pk-yrs)     Types: Cigarettes     Start date: 1971     Quit date: 2000     Years since quittin.8     Passive exposure: Current    Smokeless tobacco: Never   Substance Use Topics    Alcohol use: Not Currently     Comment: relapse 2021 sober        History   Drug Use    Types: Marijuana     Comment: smoking and edibles daily               Objective    /68 (BP Location: Right arm, Patient Position: Sitting, Cuff Size: Adult Regular)   Pulse 78   Temp 98.7  F (37.1  C)   Resp 18   Ht 1.651 m (5' 5\")   Wt 81.2 kg (179 lb)   LMP  (LMP Unknown)   SpO2 99%   BMI 29.79 kg/m     Estimated body mass index is 29.79 kg/m  as calculated from the following:    Height as of this encounter: 1.651 m (5' 5\").    Weight as of this encounter: 81.2 kg (179 lb).  Physical Exam  GENERAL: alert and no distress  NECK: no adenopathy, no asymmetry, masses, or scars  RESP: lungs clear to auscultation - no rales, rhonchi or wheezes  CV: regular rate and rhythm, normal S1 S2, no S3 or S4, no murmur, click or rub, no peripheral edema  ABDOMEN: soft, nontender, no hepatosplenomegaly, no masses and bowel sounds normal  MS: no gross musculoskeletal defects noted, no edema    Recent Labs   Lab Test 25  1427 25  1358 24  1142 24  1620 24  1144 24  0940 24  1105 24  0956   HGB 16.8*  --  17.8*  --    < > 17.2*   < >  --      --  215  --    < > 277   < >  --    INR  --   --   --  0.99  --  1.01  --   --      --  141  --    < > 140   < >  --    POTASSIUM 4.5  --  4.9  --    < > 4.6   < >  --    CR 0.58  --  0.65  --    < > 0.54  "  < >  --    A1C  --  5.4  --   --   --   --   --  5.7*    < > = values in this interval not displayed.        Diagnostics  Labs pending at this time.  Results will be reviewed when available.       Revised Cardiac Risk Index (RCRI)  The patient has the following serious cardiovascular risks for perioperative complications:   - No serious cardiac risks = 0 points     RCRI Interpretation: 1 point: Class II (low risk - 0.9% complication rate)         Signed Electronically by: Kaushik Hobbs CNP  A copy of this evaluation report is provided to the requesting physician.

## 2025-05-07 ENCOUNTER — VIRTUAL VISIT (OUTPATIENT)
Dept: PSYCHOLOGY | Facility: CLINIC | Age: 70
End: 2025-05-07
Payer: MEDICARE

## 2025-05-07 DIAGNOSIS — F41.1 GENERALIZED ANXIETY DISORDER: ICD-10-CM

## 2025-05-07 DIAGNOSIS — F33.2 MAJOR DEPRESSIVE DISORDER, RECURRENT SEVERE WITHOUT PSYCHOTIC FEATURES (H): Primary | ICD-10-CM

## 2025-05-07 PROCEDURE — 90834 PSYTX W PT 45 MINUTES: CPT | Mod: 95 | Performed by: MARRIAGE & FAMILY THERAPIST

## 2025-05-07 NOTE — PROGRESS NOTES
[facility]                                     Progress Note    Patient Name: Randi Cleary  Date: 2025          Service Type: Individual      Session Start Time:  1:36  Session End Time: 2:28     Session Length: 38-52    Session #: 28    Attendees: Client    Service Modality:  Video Visit:      Provider verified identity through the following two step process.  Patient provided:  Patient  and Patient is known previously to provider    Telemedicine Visit: The patient's condition can be safely assessed and treated via synchronous audio and visual telemedicine encounter.      Reason for Telemedicine Visit: Patient has requested telehealth visit    Originating Site (Patient Location): Patient's home    Distant Site (Provider Location): Provider Remote Setting- Home Office    Consent:  The patient/guardian has verbally consented to: the potential risks and benefits of telemedicine (video visit) versus in person care; bill my insurance or make self-payment for services provided; and responsibility for payment of non-covered services.     Patient would like the video invitation sent by:  My Chart    Mode of Communication:  Video Conference via Amwell    Distant Location (Provider):  Off-site    As the provider I attest to compliance with applicable laws and regulations related to telemedicine.        DATA  Interactive Complexity: No  Crisis: No        Progress Since Last Session (Related to Symptoms / Goals / Homework):   Symptoms: Improving      Homework: n/a      Episode of Care Goals: Satisfactory progress - ACTION (Actively working towards change); Intervened by reinforcing change plan / affirming steps taken     Current / Ongoing Stressors and Concerns:   Surgery scheduled for 23 and she is eager to complete it.  Worry about finances and state of the [place].      Treatment Objective(s) Addressed in This Session:   Distress tolerance     Intervention:   Validation, safety  assessment.  Encouragement.  Small steps toward larger goals. Cognitive modification.      Assessments completed prior to visit:  The following assessments were completed by patient for this visit:  PHQ9:       2/17/2025     9:49 AM 2/24/2025     9:43 AM 3/3/2025    10:28 AM 3/5/2025    11:44 AM 3/11/2025     8:36 PM 4/18/2025    10:11 AM 4/21/2025    10:36 AM   PHQ-9 SCORE   PHQ-9 Total Score MyChart 16 (Moderately severe depression) 15 (Moderately severe depression) 15 (Moderately severe depression) 15 (Moderately severe depression) 14 (Moderate depression) 16 (Moderately severe depression) 16 (Moderately severe depression)   PHQ-9 Total Score 16  15  15  15  14  16  16        Patient-reported     GAD7:       11/4/2024     2:38 PM 12/3/2024     9:10 AM 12/17/2024     9:13 AM 1/23/2025     8:47 AM 2/10/2025    10:22 PM 3/3/2025    10:45 AM 4/9/2025     8:50 AM   CHAVO-7 SCORE   Total Score 10 (moderate anxiety) 17 (severe anxiety) 13 (moderate anxiety) 11 (moderate anxiety) 14 (moderate anxiety) 16 (severe anxiety) 12 (moderate anxiety)   Total Score 10  17  13  11  14  16  12        Patient-reported     CAGE-AID:       6/22/2020     7:12 PM 1/18/2022    11:15 PM 6/6/2024     8:00 AM   CAGE-AID Total Score   Total Score 4 4 4   Total Score MyChart 4 (A total score of 2 or greater is considered clinically significant) 4 (A total score of 2 or greater is considered clinically significant)      PROMIS 10-Global Health (all questions and answers displayed):       11/26/2024     5:00 PM 12/4/2024     9:28 AM 12/18/2024     9:10 AM 12/30/2024     5:44 PM 4/1/2025    11:00 AM 4/9/2025     9:00 AM 4/21/2025    10:39 AM   PROMIS 10   In general, would you say your health is:  Good Fair Fair  Fair Fair   In general, would you say your quality of life is:  Poor Fair Poor  Poor Poor   In general, how would you rate your physical health?  Good Fair Fair  Fair Poor   In general, how would you rate your mental health, including  your mood and your ability to think?  Fair Fair Poor  Fair Fair   In general, how would you rate your satisfaction with your social activities and relationships?  Poor Poor Poor  Poor Poor   In general, please rate how well you carry out your usual social activities and roles  Poor Poor Poor  Poor Poor   To what extent are you able to carry out your everyday physical activities such as walking, climbing stairs, carrying groceries, or moving a chair?  Moderately Moderately Moderately  Moderately Moderately   In the past 7 days, how often have you been bothered by emotional problems such as feeling anxious, depressed, or irritable?  Often Often Often  Often Often   In the past 7 days, how would you rate your fatigue on average?  Moderate Moderate Moderate  Moderate Moderate   In the past 7 days, how would you rate your pain on average, where 0 means no pain, and 10 means worst imaginable pain?  8 7 8  8 8   In general, would you say your health is:  3 2 2 3 2 2   In general, would you say your quality of life is:  1 2 1 1 1 1   In general, how would you rate your physical health?  3 2 2 2 2 1   In general, how would you rate your mental health, including your mood and your ability to think?  2 2 1 2 2 2   In general, how would you rate your satisfaction with your social activities and relationships?  1 1 1 2 1 1   In general, please rate how well you carry out your usual social activities and roles. (This includes activities at home, at work and in your community, and responsibilities as a parent, child, spouse, employee, friend, etc.)  1 1 1 1 1 1   To what extent are you able to carry out your everyday physical activities such as walking, climbing stairs, carrying groceries, or moving a chair?  3 3 3 3 3 3   In the past 7 days, how often have you been bothered by emotional problems such as feeling anxious, depressed, or irritable?  4 4 4 4 4 4   In the past 7 days, how would you rate your fatigue on average?  3 3 3  3 3 3   In the past 7 days, how would you rate your pain on average, where 0 means no pain, and 10 means worst imaginable pain?  8 7 8 8 8 8   Global Mental Health Score  6  7  5  7  6  6    Global Physical Health Score  11  10  10  10  10  9    PROMIS TOTAL - SUBSCORES  17  17  15  17  16  15        Information is confidential and restricted. Go to Review Flowsheets to unlock data.    Patient-reported     Boyle Suicide Severity Rating Scale (Lifetime/Recent)      1/9/2023     1:00 PM 5/21/2024     4:49 PM 5/21/2024     9:00 PM 6/6/2024     8:00 AM 7/10/2024    12:00 PM 11/22/2024    11:05 AM 2/26/2025     5:59 PM   Boyle Suicide Severity Rating (Lifetime/Recent)   Q1 Wish to be Dead (Lifetime)   Yes       Comments   OD on medications       Q2 Non-Specific Active Suicidal Thoughts (Lifetime)   No       Most Severe Ideation Rating (Lifetime)   5       Most Severe Ideation Description (Lifetime)   OD on medication       Frequency (Lifetime)   3       Duration (Lifetime)   3       Controllability (Lifetime)   2       Protective Factors  (Lifetime)   4       Reasons for Ideation (Lifetime)   5       Q1 Wished to be Dead (Past Month) yes  1-->yes 1-->yes 1-->yes  0-->no 0-->no   Q2 Suicidal Thoughts (Past Month) no  1-->yes 1-->yes 1-->yes  0-->no 0-->no   Q3 Suicidal Thought Method no  0-->no 0-->no 1-->yes      Q4 Suicidal Intent without Specific Plan no  1-->yes 0-->no 0-->no      Q5 Suicide Intent with Specific Plan no  0-->no 0-->no 1-->yes      Q6 Suicide Behavior (Lifetime) yes  1-->yes 0-->no 1-->yes  0-->no 0-->no   If yes to Q6, within past 3 months? no  1-->yes  0-->no  0-->no       Level of Risk per Screen moderate risk  high risk moderate risk high risk  no risks indicated no risks indicated   1. Wish to be Dead (Lifetime)     N     2. Non-Specific Active Suicidal Thoughts (Lifetime)     N         Data saved with a previous flowsheet row definition         ASSESSMENT: Current Emotional / Mental Status  (status of significant symptoms):   Risk status (Self / Other harm or suicidal ideation)   Patient denies current fears or concerns for personal safety.   Patient denies current or recent suicidal ideation or behaviors.   Patient denies current or recent homicidal ideation or behaviors.   Patient denies current or recent self injurious behavior or ideation.   Patient denies other safety concerns.   Patient reports there has been no change in risk factors since their last session.     Patient reports there has been no change in protective factors since their last session.     A safety and risk management plan has been developed including: Patient consented to co-developed safety plan on 6/4/24.  Safety and risk management plan was reviewed.   Patient agreed to use safety plan should any safety concerns arise.  A copy was made available to the patient.     Appearance:   Appropriate    Eye Contact:   Good    Psychomotor Behavior: Normal    Attitude:   Cooperative  Friendly   Orientation:   All   Speech    Rate / Production: Talkative    Volume:  Normal    Mood:    Anxious    Affect:    Appropriate  Bright    Thought Content:  Clear    Thought Form:  Goal Directed    Insight:    Good      Medication Review:   No changes to current psychiatric medication(s)     Medication Compliance:   Yes     Changes in Health Issues:   None reported     Chemical Use Review:   Substance Use: Chemical use reviewed, no active concerns identified      Tobacco Use: No current tobacco use.      Diagnosis:  1. 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 Generalized anxiety disorder          Collateral Reports Completed:   Not Applicable    PLAN: (Patient Tasks / Therapist Tasks / Other)  Continue following safety plan.  Focus on distress tolerance and self-care ahead of surgery next week.  Limit exposure to political news.      Erika Colorado, EWELINA                                                          ______________________________________________________________________    Individual Treatment Plan    Patient's Name: Randi Cleary  YOB: 1955    Date of Creation: 7/17/2024  Date Treatment Plan Last Reviewed/Revised: 7/17/2024, 10/30/24, 2/3/2025, 5/7/2025     DSM5 Diagnoses:   296.33 (F33.2) Major Depressive Disorder, Recurrent Episode, Severe   300.02 (F41.1) Generalized Anxiety Disorder  Psychosocial / Contextual Factors: history of trauma  PROMIS (reviewed every 90 days):     Referral / Collaboration:  Discussed and patient will pursue a therapy group for depressive symptoms.    Anticipated number of session for this episode of care: 9-12 sessions  Anticipation frequency of session: Weekly  Anticipated Duration of each session: 38-52 minutes  Treatment plan will be reviewed in 90 days or when goals have been changed.       MeasurableTreatment Goal(s) related to diagnosis / functional impairment(s)  Goal 1: Client will keep self safe and eliminate suicidal ideation and self-harm behaviors.    Objective #A (Client Action)    Client will make a list of at least 10 skills or activities that you will to use to distract from urges to harm self.  Status: Completed - Date: 10/30/24      Intervention(s)  Therapist will provide ideas and strategies for generating coping skills list.    Objective #B  Client will make a list of pros and cons for tolerating and not tolerating an urge to harm self.  Status: Continued - Date(s): 5/7/2025     Intervention(s)  Therapist will guide client through DBT-style Pros/Cons list for behavior change.    Objective #C  Client will identify and practice the new strategies for dealing with strong emotions, learn and practice relaxation breathing.  Status: Continued - Date(s): 5/07/25      Intervention(s)  Therapist will teach distraction skills. Practice them within session and outside of session.    Goal 2: Client will report reduction in anxiety also as evidenced  by reduction of CHAVO-7 score below 6 points within the next 12 weeks.    Objective #A (Client Action)    Client will identify at least three triggers for anxiety.  Status: Completed - Date: 10/30/24      Intervention(s)  Therapist will provide educational materials on common triggers and signs of anxiety.    Objective #B  Client will use relaxation strategies at least two times per day to reduce the physical symptoms of anxiety.  Status: Continued - Date(s): 5/7/2025     Intervention(s)  Therapist will teach relaxation strategies such as mindfulness, deep breathing, muscle relaxation, and sensory activities.    Objective #C  Client will use cognitive strategies identified in therapy to challenge anxious thoughts.  Status: Continued - Date(s): 5/7/2025     Intervention(s)  Therapist will teach strategies for cognitive modification using REBT model.    Goal 3: Client will report improved mood as indicated by PHQ-9 score below 6 for consistent 8 weeks.    Objective #A (Client Action)    Status: Continued - Date: 5/7/2025     Client will Increase interest, engagement, and pleasure in doing things.    Intervention(s)  Therapist will assign homework for daily involvement in pleasant activities.    Objective #B  Client will Identify negative self-talk and behaviors: challenge core beliefs, myths, and actions.    Status: Continued - Date(s): 5/7/2025     Intervention(s)  Therapist will teach strategies for cognitive modification using REBT models.    Objective #C  Client will Improve quantity and quality of night time sleep / decrease daytime naps.  Status: Continued - Date(s): 5/7/2025     Intervention(s)  Therapist will teach sleep hygiene strategies.  Assign homework for daily practice.    Goal 4: Client will report reduced or eliminated physiological activation when recalling a distressing event including decreased re-experiencing, decreased avoidance, and decreased hyperarousal.    Objective #A (Client  Action)    Status: Continued: 5/7/25      Client will acquire knowledge of trauma, common symptoms post-trauma, emotion regulation strategies, stress management strategies, and cognitive coping strategies.    Intervention(s)  Therapist will teach about effects of trauma on mind and body; encourage emotion identification and expression; practice with client the stress management strategies; and teach cognitive modification.    Objective #B  Client will use Brainspotting while focusing on physiological sensations related to distressing events.  Client will assess and rate level of physiological activation.  Status: Continued - Date(s): 5/7/2025     Intervention(s)  Therapist will provide use a pointer or other materials to assist client in holding a brainspot in their visual field.  Therapist might also provide biolateral music through headphones to deepen the experience.         Patient has reviewed and agreed to the above plan.      Erika Colorado, LMFT  July 17, 2024

## 2025-05-12 ASSESSMENT — SLEEP AND FATIGUE QUESTIONNAIRES
HOW LIKELY ARE YOU TO NOD OFF OR FALL ASLEEP WHEN YOU ARE A PASSENGER IN A CAR FOR AN HOUR WITHOUT A BREAK: WOULD NEVER DOZE
HOW LIKELY ARE YOU TO NOD OFF OR FALL ASLEEP WHILE SITTING AND TALKING TO SOMEONE: WOULD NEVER DOZE
HOW LIKELY ARE YOU TO NOD OFF OR FALL ASLEEP IN A CAR, WHILE STOPPED FOR A FEW MINUTES IN TRAFFIC: WOULD NEVER DOZE
HOW LIKELY ARE YOU TO NOD OFF OR FALL ASLEEP WHILE SITTING INACTIVE IN A PUBLIC PLACE: SLIGHT CHANCE OF DOZING
HOW LIKELY ARE YOU TO NOD OFF OR FALL ASLEEP WHILE SITTING QUIETLY AFTER LUNCH WITHOUT ALCOHOL: SLIGHT CHANCE OF DOZING
HOW LIKELY ARE YOU TO NOD OFF OR FALL ASLEEP WHILE SITTING AND READING: MODERATE CHANCE OF DOZING
HOW LIKELY ARE YOU TO NOD OFF OR FALL ASLEEP WHILE WATCHING TV: HIGH CHANCE OF DOZING
HOW LIKELY ARE YOU TO NOD OFF OR FALL ASLEEP WHILE LYING DOWN TO REST IN THE AFTERNOON WHEN CIRCUMSTANCES PERMIT: HIGH CHANCE OF DOZING

## 2025-05-13 ENCOUNTER — VIRTUAL VISIT (OUTPATIENT)
Dept: SLEEP MEDICINE | Facility: CLINIC | Age: 70
End: 2025-05-13
Payer: MEDICARE

## 2025-05-13 ENCOUNTER — PATIENT OUTREACH (OUTPATIENT)
Dept: CARE COORDINATION | Facility: CLINIC | Age: 70
End: 2025-05-13

## 2025-05-13 VITALS — BODY MASS INDEX: 28.12 KG/M2 | HEIGHT: 66 IN | WEIGHT: 175 LBS

## 2025-05-13 DIAGNOSIS — R63.4 WEIGHT LOSS: ICD-10-CM

## 2025-05-13 DIAGNOSIS — G47.00 INSOMNIA, UNSPECIFIED TYPE: ICD-10-CM

## 2025-05-13 DIAGNOSIS — G47.33 OSA (OBSTRUCTIVE SLEEP APNEA): Primary | ICD-10-CM

## 2025-05-13 DIAGNOSIS — M48.02 CERVICAL STENOSIS OF SPINAL CANAL: ICD-10-CM

## 2025-05-13 DIAGNOSIS — F11.90 CHRONIC, CONTINUOUS USE OF OPIOIDS: ICD-10-CM

## 2025-05-13 DIAGNOSIS — G25.81 RESTLESS LEG SYNDROME: ICD-10-CM

## 2025-05-13 PROCEDURE — 98006 SYNCH AUDIO-VIDEO EST MOD 30: CPT | Performed by: INTERNAL MEDICINE

## 2025-05-13 PROCEDURE — 1125F AMNT PAIN NOTED PAIN PRSNT: CPT | Performed by: INTERNAL MEDICINE

## 2025-05-13 ASSESSMENT — PAIN SCALES - GENERAL: PAINLEVEL_OUTOF10: SEVERE PAIN (7)

## 2025-05-13 NOTE — PATIENT INSTRUCTIONS
Behavioral Sleep Medicine Program    The [facility] Behavioral Sleep Medicine Program,  provides non-drug treatment for sleep problems as part of our multi-discipline sleep medicine program. Services offered include:    Cognitive-behavioral Therapies for Insomnia (CBT-I)  Management of Shiftwork and Jet Lag Sleep Disorders  Management of Delayed, Advanced and Irregular Circadian Rhythm Sleep Disorders  Imagery Rehearsal Therapy (IRT) for Nightmare Disorder  Adaptation to PAP Therapy for Obstructive Sleep Apnea  Behavioral strategies to manage hypersomnia and fatigue    You have been referred for consultation with a sleep psychologist who specializes in behavioral sleep medicine and treatment of insomnia.  [facility] offers individualized telehealth services through our [facility] and online CBT-I.    Preparing for your Consultation    We recommend you keep a Sleep Diary for at least a week prior to your visit. Complete the sleep diary each day first thing after you get up by answering a few key questions about your sleep using our convenient mobile chas or paper sleep diary.  Your answers should be based on your recall of the past 24 hours.  Avoid watching the clock or recording data during the night.     CBT-i  Chas    The CBT-i  mobile chas  is a convenient way to keep track of your sleep prior to your sleep consultation.  Simply download the free chas on your Apple or Android phone and record your information each morning.  The chas is an important tool that will be used if you proceed with CBT-I with your sleep provider. Prior to your consultation we recommend you use only the sleep diary function. You can e-mail yourself a copy of your sleep diary data by going to the Settings section and using the [place] User Data function.  During your consultation your provider will review the data with you.          [facility] Sleep  Diary    You can also keep track of your sleep using our paper sleep diary.  Make sure you follow the directions and fill out each morning.        CBT-I:  Frequently Asked Questions    What is CBT-I?    Cognitive Behavioral Therapy for Insomnia, also known as CBT-I, is a highly effective non-drug treatment for insomnia. The American College of Physicians recommends CBT-I as the first treatment for chronic insomnia.  Research has shown CBT-I to be safer and more effective long term than sleeping pills.    What does CBT-I involve?    CBT-I targets behaviors that lead to chronic insomnia:  Habits that weaken the bed as a cue for sleep  Habits that weaken your body's sleep drive and sleep/wake clock   Unhelpful sleep thoughts that increase sleep-related worry and arousal.    The process involves 3-6 telehealth visits that guide you to implement proven strategies to get a better night's sleep.    People often see improvement in their sleep within a few weeks. Research shows if you keep practicing the skills you learn your sleep is likely to continue to improve 6-12 months after treatment.    Does this program prescribe or manage sleep medication?    No.  Your prescribing provider is responsible to assist you in managing your sleep medications.  Some people choose to stop using sleep medication prior to or during CBT-I.  Our program can work with your prescribing provider to help reduce or eliminate use of sleep medications.     Getting Started Today!    We recommend you consider making the following changes to your sleep habits prior to your behavioral sleep medicine consultation if you have not done so already:     Reduce your caffeine and alcohol use.  Both can disrupt sleep and make strengthening your sleep more difficult.  Specifically:    - Avoid caffeine within 8 hours of your bedtime   - No more than 3 caffeinated beverages per day (e.g. 8 oz. cup coffee or 12 oz. soda)            - No alcohol within 3 hours of  bedtime    Make sure your bedroom is quiet, comfortable, and dark.  Noise, light, and an uncomfortable sleep space can harm your sleep.     Get out of bed at the same time every day.    I  Self-help Workbooks for Insomnia    If you have found self-help books useful in the past, you may want to consider reading one of the following books prior to your consultation:    Say [place]afne to Insomnia: The Six-Week, Drug-Free Program Developed at [facility].  Leonel Espinoza MD. Available in paperback, Kylah, and audiobook.    Overcoming Insomnia: A Cognitive-Behavioral Therapy Approach, Workbook.  Zain Boggs, PhD  and Magalys Alonso, PhD.  Available in paperback and Kylah.    Quiet Your Mind and Get to Sleep: Solutions to Insomnia for Those with Depression, Anxiety, or Chronic Pain.  Korina King, PhD and Magalys Alonso, PhD.  Available in paperback and Kylah

## 2025-05-13 NOTE — PROGRESS NOTES
Clinic Care Coordination Contact  CCC RN spoke with patient today to follow up on goal and to discuss any needs or concerns. Patient stated she was unable to call. She asked if writer would call her back tomorrow.

## 2025-05-13 NOTE — PROGRESS NOTES
Virtual Visit Details    Type of service:  Video Visit   Video Start Time: 11:00 AM  Video End Time:11:27 AM    Originating Location (pt. Location): Home    Distant Location (provider location):  Off-site  Platform used for Video Visit: Tara    Chief complaint: Insomnia and history of sleep apnea    Last sleep medicine visit 2/7/2024    History of Present Illness: 69-year-old female with history of treatment resistant depression complicated by serotonin syndrome, restless leg syndrome, severe obstructive sleep apnea with hypoxemia.  She also has a history of incontinence.  Pain issues.  With her current regimen she usually can fall asleep.  However she wakes up frequently at night and can have difficulty returning to sleep.  She also has nerve nerve pain.  She currently sleeps with the head of the bed elevated she cannot sleep that flat.  She also has her feet elevated.  She sleeps in the same room as her  but not in the same bed.  There has been snoring observed.  She has had significant and sustained weight loss and had had a previous sleep study ordered but it did not end up getting completed.  She has been doing ECT now for a while and is currently on maintenance ECT with benefit.  Recently she had a stimulator placed for incontinence and is optimistic that it may be beneficial.  Before bed she takes ropinirole, melatonin.  She also takes some gabapentin before bed except for on the nights before ECT.  Typical bedtime might be 9 to 10 PM with getting out of bed around 5 to 6 AM.  She does not drink some caffeinated beverages but not after 1 PM.      [place]wall Sleepiness Scale  Total score - [place]: (Patient-Rptd) 10 (5/12/2025  4:30 PM)   (Less than 10 normal)    Insomnia Severity Scale  TEVIN Total Score: (Patient-Rptd) 23  (normal 0-7, mild 8-14, moderate 15-21, severe 22-28)    Past Medical History:   Diagnosis Date    Bipolar 2 disorder (H)     Breast cancer (H) 1986    lumpectomy, radiation, chemo     "Chronic pain syndrome     COPD (chronic obstructive pulmonary disease) (H)     asthma    Cord compression (H) 12/21/2021    Dizzy     Drug tolerance     opioid    Esophageal reflux     Fatigue     Generalized anxiety disorder     Graves disease 1994    Hemochromatosis 02/14/2018    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; H63D not detected    History of corticosteroid therapy 11/19/2019    History of partial adrenalectomy 11/19/2019    Hx antineoplastic chemotherapy     Hx of radiation therapy     Hyperlipidaemia     Hypertension     Impaired fasting glucose 2017    Infection due to 2019 novel coronavirus 06/04/2021    Injury of neck, whiplash 07/15/2021    Joint pain     KYAW (obstructive sleep apnea) 2016    Osteopenia     Paroxysmal atrial fibrillation (H) 11/2024    Pheochromocytoma, left 08/02/2017    laparoscopically removed    Postablative hypothyroidism 1995    Prediabetes 10/03/2019    by A1c    Psoriasis     Psoriatic arthropathy (H)     Pulmonary hypertension (H)     Right rotator cuff tear     RLS (restless legs syndrome)     on ropinorole    Sacroiliitis     Serotonin syndrome 08/28/2020    [facility] - While on desvenlafaxine 100mg    Snoring     Spinal stenosis     Status post coronary angiogram 10/03/2019    Urinary incontinence     Vitamin B 12 deficiency 2009    Vitamin D deficiency 2010       Allergies   Allergen Reactions    Serotonin Reuptake Inhibitors (Ssris) Anxiety, Difficulty breathing, Headache, Palpitations and Shortness Of Breath    Bupropion Unknown    Buspirone      The patient states she had serotonin syndrome    Cephalexin      Other reaction(s): unknown rxn.    Desvenlafaxine      Serotonin syndrome    Diclofenac Sodium [Diclofenac]      Serotonin syndrome and restless legs syndrome    Gabapentin      Drove on the wrong side of the highway    Levofloxacin      \"CAN'T REMEMBER\"    Penicillins      \"SORES IN MOUTH\"    Riluzole Difficulty breathing and Swelling    Riluzole Unknown    Sulfa Antibiotics  "     Chronic cough in sulfa containing diuretic     Topiramate Other (See Comments)     Frequent urination    Dextromethorphan Anxiety       Current Outpatient Medications   Medication Sig Dispense Refill    acetaminophen (TYLENOL) 325 MG tablet Take 325-650 mg by mouth every 6 hours as needed for mild pain.      albuterol (VENTOLIN HFA) 108 (90 Base) MCG/ACT inhaler Inhale 2 puffs into the lungs every 6 hours as needed for shortness of breath, wheezing or cough 18 g 11    benzonatate (TESSALON) 100 MG capsule Take 1 capsule (100 mg) by mouth 3 times daily as needed for cough. 90 capsule 1    Cyanocobalamin (VITAMIN B-12) 5000 MCG SUBL Place 2-3 sprays under the tongue every morning. Unknown dose. 2 or 3 sprays/day      ethacrynic acid (EDECRIN) 25 MG tablet Take 1 tablet (25 mg) by mouth daily. 30 tablet 0    fluticasone-salmeterol (ADVAIR) 250-50 MCG/ACT inhaler Inhale 1 puff into the lungs every 12 hours. 60 each 2    fluticasone-vilanterol (BREO ELLIPTA) 100-25 MCG/ACT inhaler Inhale 1 puff into the lungs daily. 60 each 3    gabapentin (NEURONTIN) 100 MG capsule Take 1-2 capsules (100-200 mg) by mouth at bedtime. May also take 1-2 capsules (100-200 mg) daily as needed for other (anxiety). Do not use the night before ECT.. 120 capsule 2    guaiFENesin (MUCINEX) 600 MG 12 hr tablet Take 600 mg by mouth every morning.      ketoconazole (NIZORAL) 2 % external shampoo Apply topically daily as needed.      KLOR-CON M20 20 MEQ CR tablet Take 1 tablet (20 mEq) by mouth every morning. 90 tablet 3    Lavender Oil 80 MG CAPS Take 160 mg by mouth at bedtime.      MAGNESIUM PO Take 2 capsules by mouth 2 times daily. Strength unknown      medical cannabis (Patient's own supply) See Admin Instructions (The purpose of this order is to document that the patient reports taking medical cannabis.  This is not a prescription, and is not used to certify that the patient has a qualifying medical condition.)  Flower      melatonin 1 MG  CAPS Take 1 mg by mouth at bedtime.      naloxone (NARCAN) 4 MG/0.1ML nasal spray Spray 1 spray (4 mg) into one nostril alternating nostrils as needed for opioid reversal every 2-3 minutes until assistance arrives 0.2 mL 0    omeprazole (PRILOSEC) 20 MG DR capsule Take 1 capsule (20 mg) by mouth as needed. 90 capsule 3    oxyCODONE (ROXICODONE) 5 MG tablet Take 1 tablet (5 mg) by mouth 5 times daily. May dispense/start 25 70 tablet 0    rOPINIRole (REQUIP) 2 MG tablet Take 1 tablet (2 mg) by mouth 3 times daily. 270 tablet 3    STATIN NOT PRESCRIBED (INTENTIONAL) Please choose reason not prescribed from choices below.      SYNTHROID 150 MCG tablet Take 1 tablet (150 mcg) by mouth every morning. MON to SAT 1 tablet/day; SUN 0.5 tablet - 90 tablet 4    UNABLE TO FIND Take 1 tablet by mouth every morning. MEDICATION NAME: CETYL-MYRISFOLEATE      vitamin C (ASCORBIC ACID) 1000 MG TABS Take 1,000 mg by mouth every morning.      vitamin D3 (CHOLECALCIFEROL) 250 mcg (61143 units) capsule Take 1 capsule by mouth once a week. 'S       No current facility-administered medications for this visit.       Social History     Socioeconomic History    Marital status:      Spouse name: Not on file    Number of children: Not on file    Years of education: Not on file    Highest education level: Not on file   Occupational History    Not on file   Tobacco Use    Smoking status: Former     Current packs/day: 0.00     Average packs/day: 2.5 packs/day for 29.2 years (72.9 ttl pk-yrs)     Types: Cigarettes     Start date: 1971     Quit date: 2000     Years since quittin.8     Passive exposure: Current    Smokeless tobacco: Never   Vaping Use    Vaping status: Never Used   Substance and Sexual Activity    Alcohol use: Not Currently     Comment: relapse 2021 sober     Drug use: Yes     Types: Marijuana     Comment: smoking and edibles daily    Sexual activity: Not on file   Other Topics Concern     Parent/sibling w/ CABG, MI or angioplasty before 65F 55M? Yes   Social History Narrative    Not on file     Social Drivers of Health     Financial Resource Strain: Low Risk  (2024)    Financial Resource Strain     Within the past 12 months, have you or your family members you live with been unable to get utilities (heat, electricity) when it was really needed?: No   Food Insecurity: High Risk (2024)    Food Insecurity     Within the past 12 months, did you worry that your food would run out before you got money to buy more?: Yes     Within the past 12 months, did the food you bought just not last and you didn t have money to get more?: Yes   Transportation Needs: High Risk (2023)    Transportation Needs     Within the past 12 months, has lack of transportation kept you from medical appointments, getting your medicines, non-medical meetings or appointments, work, or from getting things that you need?: Yes   Physical Activity: Not on file   Stress: Not on file   Social Connections: Not on file   Interpersonal Safety: Low Risk  (2024)    Interpersonal Safety     Do you feel physically and emotionally safe where you currently live?: Yes     Within the past 12 months, have you been hit, slapped, kicked or otherwise physically hurt by someone?: No     Within the past 12 months, have you been humiliated or emotionally abused in other ways by your partner or ex-partner?: No   Housing Stability: Low Risk  (2024)    Housing Stability     Do you have housing? : Yes     Are you worried about losing your housing?: No       Family History   Problem Relation Age of Onset    Obesity Mother     Thyroid Disease Mother     Arthritis Mother     Hypertension Mother     Osteoporosis Mother     Hemochromatosis Father     Myocardial Infarction Father     Snoring Father     Heart Disease Father     Obesity Father     Coronary Artery Disease Father          at age 53 of heart attack    Hypertension Father   "   Genetic Disorder Father         Hemachromatosis    Lupus Sister     Hypertension Sister     Obesity Sister     Scleroderma Sister     Heart Failure Sister     Hemochromatosis Sister     Obesity Sister     Cardiovascular Sister     Hypertension Sister     Arthritis Sister     Hemochromatosis Sister     Lupus Sister     Scleroderma Sister     Coronary Artery Disease Sister     Genetic Disorder Sister         Lupus, Hemachromatosis    Hemochromatosis Brother     Hypertension Brother     Alcoholism Brother     Substance Abuse Brother     Cardiovascular Brother     Hypertension Brother     Arthritis Brother     Hemochromatosis Brother     Prostate Cancer Brother     Genetic Disorder Brother         Hemachromatosis    Obesity Brother     Cardiovascular Maternal Grandmother     Coronary Artery Disease Maternal Grandmother     Osteoporosis Maternal Grandmother     Alcoholism Maternal Grandfather     Cerebrovascular Disease Paternal Grandmother     Mental Illness Maternal Uncle     Adrenal Disorder No family hx of     Breast Cancer No family hx of     Anesthesia Reaction No family hx of     Deep Vein Thrombosis (DVT) No family hx of            EXAM:  Ht 1.676 m (5' 6\")   Wt 79.4 kg (175 lb)   LMP  (LMP Unknown)   BMI 28.25 kg/m    GENERAL: Alert and no distress  EYES: Eyes grossly normal to inspection.  No discharge or erythema, or obvious scleral/conjunctival abnormalities.  RESP: No audible wheeze, cough, or visible cyanosis.    SKIN: Visible skin clear. No significant rash, abnormal pigmentation or lesions.  NEURO: Cranial nerves grossly intact.  Mentation and speech appropriate for age.  PSYCH: Appropriate affect, tone, and pace of words       PSG 5/9/2017 [facility]  Weight 233 lbs, BMI 37.4  ESS 15  AHI 36.9 with associated hypoxemia  CPAP titrated from 5-11, CPAP pressures higher than 7 were effective at eliminating obstructive respiratory events but patient had borderline low oxygen saturations and low tidal " volumes.  Bilevel trial initiated but was associated with central apneas  PLM's noted      TSH   Date Value Ref Range Status   02/12/2025 0.50 0.30 - 4.20 uIU/mL Final   02/23/2022 1.14 0.30 - 5.00 uIU/mL Final   05/18/2021 1.31 0.40 - 4.00 mU/L Final     Ferritin   Date Value Ref Range Status   02/25/2025 109 11 - 328 ng/mL Final   02/05/2021 48 8 - 252 ng/mL Final      Lab Results   Component Value Date    A1C 6.0 05/01/2025    A1C 5.4 02/12/2025    A1C 5.7 08/21/2024    A1C 5.6 05/22/2024    A1C 5.6 01/22/2024    A1C 6.1 10/30/2020    A1C 7.2 02/12/2020    A1C 6.0 10/03/2019          ASSESSMENT:  69-year-old female with complex past medical history including hemochromatosis, serotonin syndrome, treatment resistant depression currently under improved management with maintenance ECT, restless leg syndrome, history of severe obstructive sleep apnea and hypoxemia and obesity.  However she has had significant sustained weight loss.  She continues to have ongoing issues of insomnia.  Unclear how much this is related to residual sleep apnea versus ongoing incontinence or other issues.    PLAN:  Recommended reevaluation of underlying sleep disordered breathing and her current sleep position which is head of bed and feet elevated and now that she is lost significant amount of weight.  If there is no significant sleep apnea present with then can continue to focus on insomnia with referral for CBT-I.  It is possible that once the stimulator has been working for a while she will also have on benefit to sleep.  She is agreeable with this plan.      35 minutes spent by me on the date of the encounter doing chart review, history and exam, documentation and further activities per the note  The longitudinal plan of care for the diagnosis(es)/condition(s) as documented were addressed during this visit. Due to the added complexity in care, I will continue to support Winnie in the subsequent management and with ongoing continuity of  care.    Sharmila Gregory M.D.  Pulmonary/Critical Care/Sleep Medicine    [facility]   Floor 1, [address]   [address] [place], [place] [address]. [place] [address]   Appointments: [phone]    The above note was dictated using voice recognition software and may include typographical errors. Please contact the author for any clarifications.

## 2025-05-13 NOTE — NURSING NOTE
Current patient location: [address] [place] [id number]    Is the patient currently in the state of [place]? YES    Visit mode: VIDEO    If the visit is dropped, the patient can be reconnected by:VIDEO VISIT: Text to cell phone:   Telephone Information:   Mobile [phone]       Will anyone else be joining the visit? NO  (If patient encounters technical issues they should call [phone])    Are changes needed to the allergy or medication list? Pt stated no changes to allergies and Pt stated no med changes    Are refills needed on medications prescribed by this physician? NO    Rooming Documentation:  Questionnaire(s) completed    Reason for visit: TK Correa VVF

## 2025-05-14 ENCOUNTER — ANESTHESIA EVENT (OUTPATIENT)
Dept: BEHAVIORAL HEALTH | Facility: CLINIC | Age: 70
End: 2025-05-14

## 2025-05-14 ENCOUNTER — HOSPITAL ENCOUNTER (OUTPATIENT)
Dept: BEHAVIORAL HEALTH | Facility: CLINIC | Age: 70
Discharge: HOME | End: 2025-05-14
Attending: PSYCHIATRY & NEUROLOGY
Payer: MEDICARE

## 2025-05-14 ENCOUNTER — PATIENT OUTREACH (OUTPATIENT)
Dept: CARE COORDINATION | Facility: CLINIC | Age: 70
End: 2025-05-14
Payer: MEDICARE

## 2025-05-14 ENCOUNTER — ANESTHESIA (OUTPATIENT)
Dept: BEHAVIORAL HEALTH | Facility: CLINIC | Age: 70
End: 2025-05-14
Payer: MEDICARE

## 2025-05-14 VITALS
OXYGEN SATURATION: 92 % | HEART RATE: 75 BPM | SYSTOLIC BLOOD PRESSURE: 147 MMHG | RESPIRATION RATE: 21 BRPM | DIASTOLIC BLOOD PRESSURE: 52 MMHG | TEMPERATURE: 97 F

## 2025-05-14 DIAGNOSIS — F33.2 SEVERE RECURRENT MAJOR DEPRESSION WITHOUT PSYCHOTIC FEATURES (H): Primary | ICD-10-CM

## 2025-05-14 PROCEDURE — 90870 ELECTROCONVULSIVE THERAPY: CPT

## 2025-05-14 PROCEDURE — 90870 ELECTROCONVULSIVE THERAPY: CPT | Performed by: PSYCHIATRY & NEUROLOGY

## 2025-05-14 PROCEDURE — 250N000011 HC RX IP 250 OP 636: Mod: JZ | Performed by: STUDENT IN AN ORGANIZED HEALTH CARE EDUCATION/TRAINING PROGRAM

## 2025-05-14 PROCEDURE — 250N000009 HC RX 250: Performed by: STUDENT IN AN ORGANIZED HEALTH CARE EDUCATION/TRAINING PROGRAM

## 2025-05-14 PROCEDURE — 250N000011 HC RX IP 250 OP 636: Mod: JZ | Performed by: PSYCHIATRY & NEUROLOGY

## 2025-05-14 PROCEDURE — 370N000017 HC ANESTHESIA TECHNICAL FEE, PER MIN: Performed by: STUDENT IN AN ORGANIZED HEALTH CARE EDUCATION/TRAINING PROGRAM

## 2025-05-14 RX ORDER — DEXAMETHASONE SODIUM PHOSPHATE 4 MG/ML
4 INJECTION, SOLUTION INTRA-ARTICULAR; INTRALESIONAL; INTRAMUSCULAR; INTRAVENOUS; SOFT TISSUE
OUTPATIENT
Start: 2025-05-14

## 2025-05-14 RX ORDER — NICARDIPINE HCL-0.9% SOD CHLOR 1 MG/10 ML
SYRINGE (ML) INTRAVENOUS PRN
Status: DISCONTINUED | OUTPATIENT
Start: 2025-05-14 | End: 2025-05-14

## 2025-05-14 RX ORDER — ESMOLOL HYDROCHLORIDE 10 MG/ML
INJECTION INTRAVENOUS PRN
Status: DISCONTINUED | OUTPATIENT
Start: 2025-05-14 | End: 2025-05-14

## 2025-05-14 RX ORDER — METHOHEXITAL IN WATER/PF 100MG/10ML
SYRINGE (ML) INTRAVENOUS PRN
Status: DISCONTINUED | OUTPATIENT
Start: 2025-05-14 | End: 2025-05-14

## 2025-05-14 RX ORDER — SODIUM CHLORIDE, SODIUM LACTATE, POTASSIUM CHLORIDE, CALCIUM CHLORIDE 600; 310; 30; 20 MG/100ML; MG/100ML; MG/100ML; MG/100ML
INJECTION, SOLUTION INTRAVENOUS CONTINUOUS
OUTPATIENT
Start: 2025-05-14

## 2025-05-14 RX ORDER — KETOROLAC TROMETHAMINE 30 MG/ML
30 INJECTION, SOLUTION INTRAMUSCULAR; INTRAVENOUS ONCE
Status: COMPLETED | OUTPATIENT
Start: 2025-05-14 | End: 2025-05-14

## 2025-05-14 RX ORDER — ACETAMINOPHEN 325 MG/1
975 TABLET ORAL ONCE
OUTPATIENT
Start: 2025-05-14 | End: 2025-05-14

## 2025-05-14 RX ORDER — KETOROLAC TROMETHAMINE 30 MG/ML
30 INJECTION, SOLUTION INTRAMUSCULAR; INTRAVENOUS ONCE
Start: 2025-05-14 | End: 2025-05-14

## 2025-05-14 RX ORDER — LABETALOL HYDROCHLORIDE 5 MG/ML
INJECTION, SOLUTION INTRAVENOUS PRN
Status: DISCONTINUED | OUTPATIENT
Start: 2025-05-14 | End: 2025-05-14

## 2025-05-14 RX ORDER — ONDANSETRON 2 MG/ML
4 INJECTION INTRAMUSCULAR; INTRAVENOUS EVERY 30 MIN PRN
OUTPATIENT
Start: 2025-05-14

## 2025-05-14 RX ORDER — NALOXONE HYDROCHLORIDE 0.4 MG/ML
0.1 INJECTION, SOLUTION INTRAMUSCULAR; INTRAVENOUS; SUBCUTANEOUS
OUTPATIENT
Start: 2025-05-14

## 2025-05-14 RX ORDER — ONDANSETRON 4 MG/1
4 TABLET, ORALLY DISINTEGRATING ORAL EVERY 30 MIN PRN
OUTPATIENT
Start: 2025-05-14

## 2025-05-14 RX ADMIN — SUCCINYLCHOLINE CHLORIDE 90 MG: 20 INJECTION, SOLUTION INTRAMUSCULAR; INTRAVENOUS; PARENTERAL at 12:44

## 2025-05-14 RX ADMIN — LABETALOL HYDROCHLORIDE 10 MG: 5 INJECTION, SOLUTION INTRAVENOUS at 12:44

## 2025-05-14 RX ADMIN — KETOROLAC TROMETHAMINE 30 MG: 30 INJECTION, SOLUTION INTRAMUSCULAR at 12:37

## 2025-05-14 RX ADMIN — Medication 1000 MCG: at 12:44

## 2025-05-14 RX ADMIN — ESMOLOL HYDROCHLORIDE 100 MG: 10 INJECTION, SOLUTION INTRAVENOUS at 12:44

## 2025-05-14 RX ADMIN — Medication 100 MG: at 12:44

## 2025-05-14 ASSESSMENT — COPD QUESTIONNAIRES: COPD: 1

## 2025-05-14 NOTE — ANESTHESIA PREPROCEDURE EVALUATION
Anesthesia Pre-Procedure Evaluation    Patient: Randi Cleary   MRN: [id number] : 1955        Procedure :   Electroconvulsive Therapy       Past Medical History:   Diagnosis Date    Bipolar 2 disorder (H)     Breast cancer (H)     lumpectomy, radiation, chemo    Chronic pain syndrome     COPD (chronic obstructive pulmonary disease) (H)     asthma    Cord compression (H) 2021    Dizzy     Drug tolerance     opioid    Esophageal reflux     Fatigue     Generalized anxiety disorder     Graves disease 1994    Hemochromatosis 2018    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; H63D not detected    History of corticosteroid therapy 2019    History of partial adrenalectomy 2019    Hx antineoplastic chemotherapy     Hx of radiation therapy     Hyperlipidaemia     Hypertension     Impaired fasting glucose     Infection due to 2019 novel coronavirus 2021    Injury of neck, whiplash 07/15/2021    Joint pain     KYAW (obstructive sleep apnea) 2016    Osteopenia     Paroxysmal atrial fibrillation (H) 2024    Pheochromocytoma, left 2017    laparoscopically removed    Postablative hypothyroidism 1995    Prediabetes 10/03/2019    by A1c    Psoriasis     Psoriatic arthropathy (H)     Pulmonary hypertension (H)     Right rotator cuff tear     RLS (restless legs syndrome)     on ropinorole    Sacroiliitis     Serotonin syndrome 2020    [facility] - While on desvenlafaxine 100mg    Snoring     Spinal stenosis     Status post coronary angiogram 10/03/2019    Urinary incontinence     Vitamin B 12 deficiency     Vitamin D deficiency       Past Surgical History:   Procedure Laterality Date    ARTHRODESIS ANKLE      ARTHROPLASTY ANKLE Right 2015    Procedure: ARTHROPLASTY ANKLE;  Surgeon: Jason Coughlin MD;  Location: [facility]    ARTHROPLASTY REVISION ANKLE Right 2015    Procedure: ARTHROPLASTY REVISION ANKLE;  Surgeon: Jason Coughlin MD;  Location: [facility]    BIOPSY  BREAST      BREAST BIOPSY, CORE RT/LT      COLONOSCOPY      COLONOSCOPY N/A 2/25/2021    Procedure: COLONOSCOPY;  Surgeon: Guru Elke Tolbert MD;  Location:  GI    CV CORONARY ANGIOGRAM N/A 10/3/2019    Procedure: CV CORONARY ANGIOGRAM;  Surgeon: Bryce Pierre MD;  Location:  HEART CARDIAC CATH LAB    CV RIGHT HEART CATH MEASUREMENTS RECORDED N/A 10/3/2019    Procedure: CV RIGHT HEART CATH;  Surgeon: Bryce Pierre MD;  Location:  HEART CARDIAC CATH LAB    CV RIGHT HEART CATH MEASUREMENTS RECORDED N/A 9/25/2024    Procedure: Heart Cath Right Heart Cath;  Surgeon: George Becker MD;  Location:  HEART CARDIAC CATH LAB    ESOPHAGOSCOPY, GASTROSCOPY, DUODENOSCOPY (EGD), COMBINED N/A 2/25/2021    Procedure: ESOPHAGOGASTRODUODENOSCOPY, WITH BIOPSY;  Surgeon: Guru Elke Tolbert MD;  Location:  GI    EYE SURGERY  2021    HC REMOVE TONSILS/ADENOIDS,<13 Y/O      Description: Tonsillectomy With Adenoidectomy;  Recorded: 04/07/2010;    IR LUMBAR EPIDURAL STEROID INJECTION  10/26/2004    IR LUMBAR EPIDURAL STEROID INJECTION  11/16/2004    IR LUMBAR EPIDURAL STEROID INJECTION  12/21/2004    IR LUMBAR EPIDURAL STEROID INJECTION  6/8/2006    JOINT REPLACEMENT      LAMINOPLASTY CERVICAL POSTERIOR THREE+ LEVELS Left 12/21/2021    Procedure: CERVICAL 3-CERVICAL 6 LEFT OPEN DOOR LAMINOPLASTY AND LEFT CERVICAL 4-5 AND CERVICAL 6-7 POSTERIOR FORAMINOTOMY;  Surgeon: Angela Gregory MD;  Location: [facility] OR    LAPAROSCOPIC ADRENALECTOMY Left 08/02/2017    pheochromocytoma    LAPAROSCOPIC ADRENALECTOMY Left 8/2/2017    Procedure: LAPAROSCOPIC LEFT ADRENALECTOMY, ;  Surgeon: Gab Linares MD;  Location: [facility];  Service:     LENGTHEN TENDON ACHILLES Right 6/29/2015    Procedure: LENGTHEN TENDON ACHILLES;  Surgeon: Jason Coughlin MD;  Location: [facility]    LUMPECTOMY BREAST      LUMPECTOMY BREAST Left 1994    MAMMOPLASTY REDUCTION Right 01/13/2015     "[place]    MAMMOPLASTY REDUCTION Right     approx late [id number]    MASTECTOMY      left lumpectomy with axillary node dissection    MASTECTOMY MODIFIED RADICAL      OTHER SURGICAL HISTORY Right     reconstructive breast surgery    OTHER SURGICAL HISTORY      Adrenalectomy for pheochromocytoma    MN MASTECTOMY, MODIFIED RADICAL      Description: Modified Radical Mastectomy Left Breast;  Recorded: 2010;    REPAIR HAMMER TOE Right 2015    Procedure: REPAIR HAMMER TOE;  Surgeon: Jason Coughlin MD;  Location: [facility]    TONSILLECTOMY      TONSILLECTOMY & ADENOIDECTOMY      ZC ARTHRODESIS,ANKLE,OPEN Right     Description: Ankle Arthrodesis;  Recorded: 2010;      Allergies   Allergen Reactions    Serotonin Reuptake Inhibitors (Ssris) Anxiety, Difficulty breathing, Headache, Palpitations and Shortness Of Breath    Bupropion Unknown    Buspirone      The patient states she had serotonin syndrome    Cephalexin      Other reaction(s): unknown rxn.    Desvenlafaxine      Serotonin syndrome    Diclofenac Sodium [Diclofenac]      Serotonin syndrome and restless legs syndrome    Gabapentin      Drove on the wrong side of the highway    Levofloxacin      \"CAN'T REMEMBER\"    Penicillins      \"SORES IN MOUTH\"    Riluzole Difficulty breathing and Swelling    Riluzole Unknown    Sulfa Antibiotics      Chronic cough in sulfa containing diuretic     Topiramate Other (See Comments)     Frequent urination    Dextromethorphan Anxiety      Social History     Tobacco Use    Smoking status: Former     Current packs/day: 0.00     Average packs/day: 2.5 packs/day for 29.2 years (72.9 ttl pk-yrs)     Types: Cigarettes     Start date: 1971     Quit date: 2000     Years since quittin.8     Passive exposure: Current    Smokeless tobacco: Never   Substance Use Topics    Alcohol use: Not Currently     Comment: relapse 2021 sober       Wt Readings from Last 1 Encounters:   25 79.4 kg (175 lb)    "     Anesthesia Evaluation   Pt has had prior anesthetic.     History of anesthetic complications       ROS/MED HX  ENT/Pulmonary:     (+) sleep apnea,                         COPD,              Neurologic:       Cardiovascular: Comment: pAfib & pHTN per chart    (+)  hypertension- -   -  - -                                pulmonary hypertension, Previous cardiac testing   Echo: Date: 11/2024 Results:  Global and regional left ventricular function is normal with an EF of 55-60%.  Right ventricular function, chamber size, wall motion, and thickness are normal.  No significant valvular abnormalities were noted.  This study was compared with the study from 10/5/23. No significant changes noted.  Stress Test:  Date: Results:    ECG Reviewed:  Date: Results:    Cath:  Date: Results:      METS/Exercise Tolerance:     Hematologic: Comments: Hemochromatosis       Musculoskeletal: Comment: Osteopenia  Sacral joint pain  Psoriatic arthritis  (+)  arthritis,             GI/Hepatic:     (+) GERD,     hiatal hernia,              Renal/Genitourinary:       Endo:     (+)          thyroid problem,     Obesity,       Psychiatric/Substance Use:     (+) psychiatric history bipolar       Infectious Disease:       Malignancy: Comment: Breast cancer      Other:            Physical Exam    Airway  airway exam normal      Mallampati: II   TM distance: > 3 FB   Neck ROM: full   Mouth opening: > 3 cm    Respiratory Devices and Support         Dental       (+) Modest Abnormalities - crowns, retainers, 1 or 2 missing teeth      Cardiovascular   cardiovascular exam normal          Pulmonary   pulmonary exam normal                OUTSIDE LABS:  CBC:   Lab Results   Component Value Date    WBC 5.8 02/25/2025    WBC 6.9 12/19/2024    HGB 15.9 (H) 05/01/2025    HGB 16.8 (H) 02/25/2025    HCT 48.7 (H) 02/25/2025    HCT 51.1 (H) 12/19/2024     02/25/2025     12/19/2024     BMP:   Lab Results   Component Value Date     02/25/2025      12/19/2024    POTASSIUM 5.3 05/01/2025    POTASSIUM 4.5 02/25/2025    CHLORIDE 104 02/25/2025    CHLORIDE 103 12/19/2024    CO2 27 02/25/2025    CO2 27 12/19/2024    BUN 15.4 02/25/2025    BUN 14.2 12/19/2024    CR 0.58 02/25/2025    CR 0.65 12/19/2024    GLC 76 02/25/2025    GLC 91 12/19/2024     COAGS:   Lab Results   Component Value Date    PTT 34 12/13/2021    INR 0.99 11/23/2024     POC:   Lab Results   Component Value Date     (H) 02/25/2021     HEPATIC:   Lab Results   Component Value Date    ALBUMIN 4.3 02/25/2025    PROTTOTAL 7.2 02/25/2025    ALT 17 02/25/2025    AST 23 02/25/2025    ALKPHOS 64 02/25/2025    BILITOTAL 0.4 02/25/2025     OTHER:   Lab Results   Component Value Date    A1C 6.0 (H) 05/01/2025    LINDA 9.4 02/25/2025    MAG 1.9 11/22/2024    TSH 0.50 02/12/2025    T4 1.49 02/12/2025    T3 114 01/18/2023    CRP <2.9 11/16/2021    SED 8 10/17/2023       Anesthesia Plan    ASA Status:  3    NPO Status:  NPO Appropriate    Anesthesia Type: General Mask Only.   Induction: Intravenous.           Consents    Anesthesia Plan(s) and associated risks, benefits, and realistic alternatives discussed. Questions answered and patient/representative(s) expressed understanding.     - Discussed:     - Discussed with:  Patient           - Extended Intubation/Ventilatory Support Discussed: No.     - Pt is DNR/DNI Status: no DNR       Blood Consent:         - Use of Blood Products Discussed: No      Postoperative Care    Pain management: Oral pain medications.   PONV prophylaxis: Ondansetron (or other 5HT-3)     Comments:                 Thea Ford MD    I have reviewed the pertinent notes and labs in the chart from the past 30 days and (re)examined the patient.  Any updates or changes from those notes are reflected in this note.    Clinically Significant Risk Factors Present on Admission                             # Overweight: Estimated body mass index is 28.25 kg/m  as calculated from  "the following:    Height as of 5/13/25: 1.676 m (5' 6\").    Weight as of 5/13/25: 79.4 kg (175 lb).                Physical Exam  Airway  Mallampati: II  TM distance: > 3 FB  Neck ROM: full    Cardiovascular - normal exam Comments: pAfib & pHTN per chart  Dental   (+) Modest Abnormalities - crowns, retainers, 1 or 2 missing teeth      Pulmonary - normal exam      Neurological   Other Findings     Anesthesia Plan    ASA Status:  3      NPO Status: NPO Appropriate   Anesthesia Type: General Mask Only.        Consents    Anesthesia Plan(s) and associated risks, benefits, and realistic alternatives discussed. Questions answered and patient/representative(s) expressed understanding.     - Discussed:     - Discussed with:  Patient        - Pt is DNR/DNI Status: no DNR          Postoperative Care         Comments:             "

## 2025-05-14 NOTE — PROGRESS NOTES
Dr. Logan Alonzo,  anesthesiologist, reviewed patient's history and physical, labs and EKG.  Patient cleared to proceed with ECT treatment.

## 2025-05-14 NOTE — ANESTHESIA POSTPROCEDURE EVALUATION
Patient: Randi Cleary    Procedure: * No procedures listed *  Electroconvulsive Therapy    Anesthesia Type:  General    Note:     Postop Pain Control: Uneventful            Sign Out: Well controlled pain   PONV: No   Neuro/Psych: Uneventful            Sign Out: Acceptable/Baseline neuro status   Airway/Respiratory: Uneventful            Sign Out: Acceptable/Baseline resp. status   CV/Hemodynamics: Uneventful            Sign Out: Acceptable CV status; No obvious hypovolemia; No obvious fluid overload   Other NRE: NONE   DID A NON-ROUTINE EVENT OCCUR? No           Last vitals:  Vitals:    05/14/25 1157 05/14/25 1257 05/14/25 1300   BP: 128/73 118/70 105/64   Pulse: 86 70 73   Resp: 16 17 18   Temp: 36.9  C (98.4  F) 36.3  C (97.3  F)    SpO2: 95% 95% 93%       Electronically Signed By: Thea Ford MD  May 14, 2025  1:16 PM

## 2025-05-14 NOTE — DISCHARGE INSTRUCTIONS
ECT Discharge Instructions      During your ECT series:    Do not drive or work heavy equipment until 7 days after your last treatment.  Do not drink alcohol or use street drugs (illicit drugs) while you are having treatments.  Do not make important decisions, including legal decisions.    After each treatment:    Get plenty of rest. A responsible adult must stay with your for at least 6 hours.  Avoid heavy or risky activities for 24 hours while in maintenance ECT.   Do not drive for at least 24 hours after your treatment while in maintenance ECT.   If you have more than one treatment within one week, do not drive for 7 days after your last treatment.   If you feel light-headed, sit for a few minutes before standing. Have someone help you get up.  If you have nausea (feel sick to your stomach): Drink only clear liquids such as apple juice, ginger ale, broth or 7UP, Be sure to drink plenty of liquids. Move to a normal diet as you feel able.   If you received Toradol, wait 6 hours before taking ibuprofen.  Call your doctor if:   You have a fever over 100F (37.7 C) (taken under the tongue), or a fever that last more than 24 hours.  Your IV site is red, swollen, very painful or is getting more tender.  You have nausea that gets very bad or does not improve.    If you have any symptoms after ECT, tell our staff before your next treatment.  The ECT Department can be reached at [phone].  The ECT Department is open Mondays, Wednesdays and Fridays from 7:00 AM to 2:00 PM.    To speak to a doctor, call:  Your primary care provider or [facility] for Dr. Riley, Dr. Beavers, Dr. Hutchison or Dr. Cotter PHONE: [phone]; fax: [phone]    New instructions:  Plan:   - Plan for maintenance in 3 weeks    - Monitor depression severity with clinical assessment augmented with PHQ9 every other treatment  - Continue current medications     Discharge instructions:   -- Remember to NOT take gabapentin after 6pm the  night before each treatment  -- During maintenance ECT, you can't drive for 24 hours after each treatment.  - Call [facility] to learn more about the ongoing VNS study. (Eligibility would require a more severe current symptoms): Phone: [phone]        Dr ALPHONSO Beavers MD   Psychiatry         Your next ECT appointment will now be scheduled by a scheduling service. They should call you within 24 hours of your ECT appointment. For any urgent scheduling needs or to change your appointment, please call the ECT department at [phone] and they will get in touch with scheduling for you.     You have received Toradol today at 1200. Toradol is an NSAID.  Do not take any NSAID's including: Ibuprofen, Naproxen Sodium, Asprin, Advil, Motrin, Aleve, Shannan, etc., until six hours after the above time which would be 6pm. If you have any questions check back with your Physician, or pharmacist.     Covid-19 Testing:  We are no longer requiring covid tests prior to your procedure. If you are ill, please call the ECT department to discuss plan for rescheduling your appointment. [phone]    NEW: Please come to the [facility] entrance at [address] [place] [address] and check in at Room NB14. You will receive your wristband and stickers there and can then come down to the ECT department in the basement of the [facility].       After your appointment, you may be picked up at the [facility] entrance, which is located at [address] [place] [address].  [id number], about 1 block North of the [facility] entrance.    Transported by:   Sidney Palma     Reviewed AVS discharge instructions with:   Sidney
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
## 2025-05-14 NOTE — ANESTHESIA CARE TRANSFER NOTE
Patient: Randi Cleary    Procedure: * No procedures listed *  Electroconvulsive Therapy    Diagnosis: * No pre-op diagnosis entered *  Diagnosis Additional Information: No value filed.    Anesthesia Type:   General     Note:    Oropharynx: oropharynx clear of all foreign objects and spontaneously breathing  Level of Consciousness: drowsy  Oxygen Supplementation: room air    Independent Airway: airway patency satisfactory and stable  Dentition: dentition unchanged  Vital Signs Stable: post-procedure vital signs reviewed and stable  Report to RN Given: handoff report given  Patient transferred to: PACU    Handoff Report: Identifed the Patient, Identified the Reponsible Provider, Reviewed the pertinent medical history, Discussed the surgical course, Reviewed Intra-OP anesthesia mangement and issues during anesthesia, Set expectations for post-procedure period and Allowed opportunity for questions and acknowledgement of understanding      Vitals:  Vitals Value Taken Time   /109 05/14/25 13:13   Temp     Pulse 75 05/14/25 13:15   Resp 18 05/14/25 13:13   SpO2 91 % 05/14/25 13:16   Vitals shown include unfiled device data.    Electronically Signed By: Thea Ford MD  May 14, 2025  1:17 PM

## 2025-05-19 ENCOUNTER — VIRTUAL VISIT (OUTPATIENT)
Dept: PSYCHOLOGY | Facility: CLINIC | Age: 70
End: 2025-05-19
Payer: MEDICARE

## 2025-05-19 DIAGNOSIS — F33.2 MAJOR DEPRESSIVE DISORDER, RECURRENT SEVERE WITHOUT PSYCHOTIC FEATURES (H): Primary | ICD-10-CM

## 2025-05-19 DIAGNOSIS — F41.1 GENERALIZED ANXIETY DISORDER: ICD-10-CM

## 2025-05-19 PROCEDURE — 90837 PSYTX W PT 60 MINUTES: CPT | Mod: 95 | Performed by: MARRIAGE & FAMILY THERAPIST

## 2025-05-19 NOTE — PROGRESS NOTES
[facility]                                     Progress Note    Patient Name: Randi Cleary  Date: 2025          Service Type: Individual      Session Start Time:  10:32  Session End Time: 11:35     Session Length: 53+    Session #: 29    Attendees: Client    Service Modality:  Video Visit:      Provider verified identity through the following two step process.  Patient provided:  Patient  and Patient is known previously to provider    Telemedicine Visit: The patient's condition can be safely assessed and treated via synchronous audio and visual telemedicine encounter.      Reason for Telemedicine Visit: Patient has requested telehealth visit    Originating Site (Patient Location): Patient's home    Distant Site (Provider Location): Provider Remote Setting- Home Office    Consent:  The patient/guardian has verbally consented to: the potential risks and benefits of telemedicine (video visit) versus in person care; bill my insurance or make self-payment for services provided; and responsibility for payment of non-covered services.     Patient would like the video invitation sent by:  My Chart    Mode of Communication:  Video Conference via Amwell    Distant Location (Provider):  Off-site    As the provider I attest to compliance with applicable laws and regulations related to telemedicine.        DATA  Interactive Complexity: No  Crisis: No        Progress Since Last Session (Related to Symptoms / Goals / Homework):   Symptoms: No change      Homework: n/a      Episode of Care Goals: Satisfactory progress - ACTION (Actively working towards change); Intervened by reinforcing change plan / affirming steps taken     Current / Ongoing Stressors and Concerns:   Missed dentist appointment today.  Frustration that surgery did not have the immediate results she was hoping for related to incontinence.  Tearful as she talked about previous generations struggles.     Treatment Objective(s)  Addressed in This Session:   Distress tolerance     Intervention:   Validation, safety assessment.  Encouragement.  Small steps toward larger goals. Discussion of generational traumas and psychoeducation about trauma.      Assessments completed prior to visit:  The following assessments were completed by patient for this visit:  PHQ9:       2/24/2025     9:43 AM 3/3/2025    10:28 AM 3/5/2025    11:44 AM 3/11/2025     8:36 PM 4/18/2025    10:11 AM 4/21/2025    10:36 AM 5/13/2025     7:54 PM   PHQ-9 SCORE   PHQ-9 Total Score MyChart 15 (Moderately severe depression) 15 (Moderately severe depression) 15 (Moderately severe depression) 14 (Moderate depression) 16 (Moderately severe depression) 16 (Moderately severe depression) 13 (Moderate depression)   PHQ-9 Total Score 15  15  15  14  16  16  13        Patient-reported     GAD7:       12/3/2024     9:10 AM 12/17/2024     9:13 AM 1/23/2025     8:47 AM 2/10/2025    10:22 PM 3/3/2025    10:45 AM 4/9/2025     8:50 AM 5/13/2025     7:56 PM   CHAVO-7 SCORE   Total Score 17 (severe anxiety) 13 (moderate anxiety) 11 (moderate anxiety) 14 (moderate anxiety) 16 (severe anxiety) 12 (moderate anxiety) 13 (moderate anxiety)   Total Score 17  13  11  14  16  12  13        Patient-reported     CAGE-AID:       6/22/2020     7:12 PM 1/18/2022    11:15 PM 6/6/2024     8:00 AM   CAGE-AID Total Score   Total Score 4 4 4   Total Score MyChart 4 (A total score of 2 or greater is considered clinically significant) 4 (A total score of 2 or greater is considered clinically significant)      PROMIS 10-Global Health (all questions and answers displayed):       11/26/2024     5:00 PM 12/4/2024     9:28 AM 12/18/2024     9:10 AM 12/30/2024     5:44 PM 4/1/2025    11:00 AM 4/9/2025     9:00 AM 4/21/2025    10:39 AM   PROMIS 10   In general, would you say your health is:  Good Fair Fair  Fair Fair   In general, would you say your quality of life is:  Poor Fair Poor  Poor Poor   In general, how would  you rate your physical health?  Good Fair Fair  Fair Poor   In general, how would you rate your mental health, including your mood and your ability to think?  Fair Fair Poor  Fair Fair   In general, how would you rate your satisfaction with your social activities and relationships?  Poor Poor Poor  Poor Poor   In general, please rate how well you carry out your usual social activities and roles  Poor Poor Poor  Poor Poor   To what extent are you able to carry out your everyday physical activities such as walking, climbing stairs, carrying groceries, or moving a chair?  Moderately Moderately Moderately  Moderately Moderately   In the past 7 days, how often have you been bothered by emotional problems such as feeling anxious, depressed, or irritable?  Often Often Often  Often Often   In the past 7 days, how would you rate your fatigue on average?  Moderate Moderate Moderate  Moderate Moderate   In the past 7 days, how would you rate your pain on average, where 0 means no pain, and 10 means worst imaginable pain?  8 7 8  8 8   In general, would you say your health is:  3 2 2 3 2 2   In general, would you say your quality of life is:  1 2 1 1 1 1   In general, how would you rate your physical health?  3 2 2 2 2 1   In general, how would you rate your mental health, including your mood and your ability to think?  2 2 1 2 2 2   In general, how would you rate your satisfaction with your social activities and relationships?  1 1 1 2 1 1   In general, please rate how well you carry out your usual social activities and roles. (This includes activities at home, at work and in your community, and responsibilities as a parent, child, spouse, employee, friend, etc.)  1 1 1 1 1 1   To what extent are you able to carry out your everyday physical activities such as walking, climbing stairs, carrying groceries, or moving a chair?  3 3 3 3 3 3   In the past 7 days, how often have you been bothered by emotional problems such as feeling  anxious, depressed, or irritable?  4 4 4 4 4 4   In the past 7 days, how would you rate your fatigue on average?  3 3 3 3 3 3   In the past 7 days, how would you rate your pain on average, where 0 means no pain, and 10 means worst imaginable pain?  8 7 8 8 8 8   Global Mental Health Score  6  7  5  7  6  6    Global Physical Health Score  11  10  10  10  10  9    PROMIS TOTAL - SUBSCORES  17  17  15  17  16  15        Information is confidential and restricted. Go to Review Flowsheets to unlock data.    Patient-reported     [place] Suicide Severity Rating Scale (Lifetime/Recent)      1/9/2023     1:00 PM 5/21/2024     4:49 PM 5/21/2024     9:00 PM 6/6/2024     8:00 AM 7/10/2024    12:00 PM 11/22/2024    11:05 AM 2/26/2025     5:59 PM   [place] Suicide Severity Rating (Lifetime/Recent)   Q1 Wish to be Dead (Lifetime)   Yes       Comments   OD on medications       Q2 Non-Specific Active Suicidal Thoughts (Lifetime)   No       Most Severe Ideation Rating (Lifetime)   5       Most Severe Ideation Description (Lifetime)   OD on medication       Frequency (Lifetime)   3       Duration (Lifetime)   3       Controllability (Lifetime)   2       Protective Factors  (Lifetime)   4       Reasons for Ideation (Lifetime)   5       Q1 Wished to be Dead (Past Month) yes  1-->yes 1-->yes 1-->yes  0-->no 0-->no   Q2 Suicidal Thoughts (Past Month) no  1-->yes 1-->yes 1-->yes  0-->no 0-->no   Q3 Suicidal Thought Method no  0-->no 0-->no 1-->yes      Q4 Suicidal Intent without Specific Plan no  1-->yes 0-->no 0-->no      Q5 Suicide Intent with Specific Plan no  0-->no 0-->no 1-->yes      Q6 Suicide Behavior (Lifetime) yes  1-->yes 0-->no 1-->yes  0-->no 0-->no   If yes to Q6, within past 3 months? no  1-->yes  0-->no  0-->no       Level of Risk per Screen moderate risk  high risk moderate risk high risk  no risks indicated no risks indicated   1. Wish to be Dead (Lifetime)     N     2. Non-Specific Active Suicidal Thoughts (Lifetime)      N         Data saved with a previous flowsheet row definition         ASSESSMENT: Current Emotional / Mental Status (status of significant symptoms):   Risk status (Self / Other harm or suicidal ideation)   Patient denies current fears or concerns for personal safety.   Patient denies current or recent suicidal ideation or behaviors.   Patient denies current or recent homicidal ideation or behaviors.   Patient denies current or recent self injurious behavior or ideation.   Patient denies other safety concerns.   Patient reports there has been no change in risk factors since their last session.     Patient reports there has been no change in protective factors since their last session.     A safety and risk management plan has been developed including: Patient consented to co-developed safety plan on 6/4/24.  Safety and risk management plan was reviewed.   Patient agreed to use safety plan should any safety concerns arise.  A copy was made available to the patient.     Appearance:   Appropriate    Eye Contact:   Good    Psychomotor Behavior: Normal    Attitude:   Cooperative  Friendly   Orientation:   All   Speech    Rate / Production: Talkative    Volume:  Normal    Mood:    Anxious    Affect:    Appropriate  Tearful   Thought Content:  Clear    Thought Form:  Goal Directed    Insight:    Good      Medication Review:   No changes to current psychiatric medication(s)     Medication Compliance:   Yes     Changes in Health Issues:   None reported     Chemical Use Review:   Substance Use: Chemical use reviewed, no active concerns identified      Tobacco Use: No current tobacco use.      Diagnosis:  1. 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 Generalized anxiety disorder          Collateral Reports Completed:   Not Applicable    PLAN: (Patient Tasks / Therapist Tasks / Other)  Continue following safety plan.  Focus on distress tolerance and self-care.  Continue discussion of  generational traumas.      Erika GOODWINAislinn JovelStanislav, LMFT                                                         ______________________________________________________________________    Individual Treatment Plan    Patient's Name: Randi Cleary  YOB: 1955    Date of Creation: 7/17/2024  Date Treatment Plan Last Reviewed/Revised: 7/17/2024, 10/30/24, 2/3/2025, 5/7/2025     DSM5 Diagnoses:   296.33 (F33.2) Major Depressive Disorder, Recurrent Episode, Severe   300.02 (F41.1) Generalized Anxiety Disorder  Psychosocial / Contextual Factors: history of trauma  PROMIS (reviewed every 90 days):     Referral / Collaboration:  Discussed and patient will pursue a therapy group for depressive symptoms.    Anticipated number of session for this episode of care: 9-12 sessions  Anticipation frequency of session: Weekly  Anticipated Duration of each session: 38-52 minutes  Treatment plan will be reviewed in 90 days or when goals have been changed.       MeasurableTreatment Goal(s) related to diagnosis / functional impairment(s)  Goal 1: Client will keep self safe and eliminate suicidal ideation and self-harm behaviors.    Objective #A (Client Action)    Client will make a list of at least 10 skills or activities that you will to use to distract from urges to harm self.  Status: Completed - Date: 10/30/24      Intervention(s)  Therapist will provide ideas and strategies for generating coping skills list.    Objective #B  Client will make a list of pros and cons for tolerating and not tolerating an urge to harm self.  Status: Continued - Date(s): 5/7/2025     Intervention(s)  Therapist will guide client through DBT-style Pros/Cons list for behavior change.    Objective #C  Client will identify and practice the new strategies for dealing with strong emotions, learn and practice relaxation breathing.  Status: Continued - Date(s): 5/19/25      Intervention(s)  Therapist will teach distraction skills. Practice them  within session and outside of session.    Goal 2: Client will report reduction in anxiety also as evidenced by reduction of CHAVO-7 score below 6 points within the next 12 weeks.    Objective #A (Client Action)    Client will identify at least three triggers for anxiety.  Status: Completed - Date: 10/30/24      Intervention(s)  Therapist will provide educational materials on common triggers and signs of anxiety.    Objective #B  Client will use relaxation strategies at least two times per day to reduce the physical symptoms of anxiety.  Status: Continued - Date(s): 5/7/2025     Intervention(s)  Therapist will teach relaxation strategies such as mindfulness, deep breathing, muscle relaxation, and sensory activities.    Objective #C  Client will use cognitive strategies identified in therapy to challenge anxious thoughts.  Status: Continued - Date(s): 5/7/2025     Intervention(s)  Therapist will teach strategies for cognitive modification using REBT model.    Goal 3: Client will report improved mood as indicated by PHQ-9 score below 6 for consistent 8 weeks.    Objective #A (Client Action)    Status: Continued - Date: 5/7/2025     Client will Increase interest, engagement, and pleasure in doing things.    Intervention(s)  Therapist will assign homework for daily involvement in pleasant activities.    Objective #B  Client will Identify negative self-talk and behaviors: challenge core beliefs, myths, and actions.    Status: Continued - Date(s): 5/7/2025     Intervention(s)  Therapist will teach strategies for cognitive modification using REBT models.    Objective #C  Client will Improve quantity and quality of night time sleep / decrease daytime naps.  Status: Continued - Date(s): 5/7/2025     Intervention(s)  Therapist will teach sleep hygiene strategies.  Assign homework for daily practice.    Goal 4: Client will report reduced or eliminated physiological activation when recalling a distressing event including decreased  re-experiencing, decreased avoidance, and decreased hyperarousal.    Objective #A (Client Action)    Status: Continued: 5/7/25      Client will acquire knowledge of trauma, common symptoms post-trauma, emotion regulation strategies, stress management strategies, and cognitive coping strategies.    Intervention(s)  Therapist will teach about effects of trauma on mind and body; encourage emotion identification and expression; practice with client the stress management strategies; and teach cognitive modification.    Objective #B  Client will use Brainspotting while focusing on physiological sensations related to distressing events.  Client will assess and rate level of physiological activation.  Status: Continued - Date(s): 5/7/2025     Intervention(s)  Therapist will provide use a pointer or other materials to assist client in holding a brainspot in their visual field.  Therapist might also provide biolateral music through headphones to deepen the experience.         Patient has reviewed and agreed to the above plan.      Erika Colorado, LMFT  July 17, 2024

## 2025-05-30 ENCOUNTER — DOCUMENTATION ONLY (OUTPATIENT)
Dept: LAB | Facility: CLINIC | Age: 70
End: 2025-05-30
Payer: MEDICARE

## 2025-05-30 DIAGNOSIS — E83.119 HEMOCHROMATOSIS, UNSPECIFIED HEMOCHROMATOSIS TYPE: Primary | ICD-10-CM

## 2025-05-30 NOTE — PROGRESS NOTES
"Randi Cleary has an upcoming lab appointment:    Future Appointments   Date Time Provider Department Center   6/3/2025 12:30 PM Erika Colorado, LMFT [facility]   6/4/2025 11:00 AM Toan Cotter MD [facility]   6/9/2025  8:30 AM Erika Colorado, LMFT PHMFCC [facility] JOEL   6/9/2025 11:15 AM STWT LAB SWLABR [facility] STWT   6/9/2025  1:00 PM Dusty Dubois MD MRPNCR [facility] MPLW   6/16/2025  8:30 AM Erika Colorado, LMFT PHMFCC [facility] JOEL   6/23/2025  9:30 [facility]   [facility]  [facility]   6/30/2025  9:30 AM [facility]   7/10/2025 12:30 PM Erika Colorado, LMFT [facility]   7/14/2025  8:30 AM [facility]   7/21/2025  8:30 AM [facility]   7/28/2025  1:00 PM Jeannette Mendoza APRN CNP URPSY [facility]   8/21/2025  8:00 PM BED 1 SH SLEEP SHSLE [place] [facility]   9/2/2025  3:30 PM  MASONIC LAB DRAW [facility]   9/2/2025  4:15 PM Tova Nunez MD [facility]   9/8/2025  1:00 PM [facility]   9/15/2025  2:00 PM [facility]   9/22/2025  2:00 PM Erika Colorado, LMFT PHMF[facility] JOEL   9/29/2025  2:00 PM Erika Colorado LMFT [facility]   11/21/2025  1:00 PM Roland Gregorio PsyD BKSB BK SLEEP   2/18/2026  1:40 PM Alee Coker MD [facility]     Patient is scheduled for the following lab(s): \"DR NUNEZ LABS\" PER APPOINTMENT NOTES    There is no order available. Please review and place either future orders or HMPO (Review of Health Maintenance Protocol Orders), as appropriate.    There are no preventive care reminders to display for this patient.  Kaykay Ford   "

## 2025-06-03 ENCOUNTER — VIRTUAL VISIT (OUTPATIENT)
Dept: PSYCHOLOGY | Facility: CLINIC | Age: 70
End: 2025-06-03
Payer: MEDICARE

## 2025-06-03 DIAGNOSIS — F41.1 GENERALIZED ANXIETY DISORDER: ICD-10-CM

## 2025-06-03 DIAGNOSIS — F33.2 MAJOR DEPRESSIVE DISORDER, RECURRENT SEVERE WITHOUT PSYCHOTIC FEATURES (H): Primary | ICD-10-CM

## 2025-06-03 PROCEDURE — 90837 PSYTX W PT 60 MINUTES: CPT | Mod: 95 | Performed by: MARRIAGE & FAMILY THERAPIST

## 2025-06-03 NOTE — PROGRESS NOTES
[facility]                                     Progress Note    Patient Name: Randi Cleary  Date: 6/3/2025           Service Type: Individual      Session Start Time:  12:30  Session End Time: 1:30     Session Length: 53+    Session #: 30    Attendees: Client    Service Modality:  Video Visit:      Provider verified identity through the following two step process.  Patient provided:  Patient  and Patient is known previously to provider    Telemedicine Visit: The patient's condition can be safely assessed and treated via synchronous audio and visual telemedicine encounter.      Reason for Telemedicine Visit: Patient has requested telehealth visit    Originating Site (Patient Location): Patient's home    Distant Site (Provider Location[facility]    Consent:  The patient/guardian has verbally consented to: the potential risks and benefits of telemedicine (video visit) versus in person care; bill my insurance or make self-payment for services provided; and responsibility for payment of non-covered services.     Patient would like the video invitation sent by:  My Chart    Mode of Communication:  Video Conference via Amwell    Distant Location (Provider):  On-site    As the provider I attest to compliance with applicable laws and regulations related to telemedicine.        DATA  Interactive Complexity: No  Crisis: No        Progress Since Last Session (Related to Symptoms / Goals / Homework):   Symptoms: No change      Homework: n/a      Episode of Care Goals: Satisfactory progress - ACTION (Actively working towards change); Intervened by reinforcing change plan / affirming steps taken     Current / Ongoing Stressors and Concerns:   Anxiety about state of country and her finances.  Making some changes to her financial planning and considering risks/benefits.     Treatment Objective(s) Addressed in This Session:   Distress  tolerance     Intervention:   Validation, safety assessment.  Encouragement.  Small steps toward larger goals. Discussion of generational traumas and psychoeducation about trauma.      Assessments completed prior to visit:  The following assessments were completed by patient for this visit:  PHQ9:       2/24/2025     9:43 AM 3/3/2025    10:28 AM 3/5/2025    11:44 AM 3/11/2025     8:36 PM 4/18/2025    10:11 AM 4/21/2025    10:36 AM 5/13/2025     7:54 PM   PHQ-9 SCORE   PHQ-9 Total Score MyChart 15 (Moderately severe depression) 15 (Moderately severe depression) 15 (Moderately severe depression) 14 (Moderate depression) 16 (Moderately severe depression) 16 (Moderately severe depression) 13 (Moderate depression)   PHQ-9 Total Score 15  15  15  14  16  16  13        Patient-reported     GAD7:       12/3/2024     9:10 AM 12/17/2024     9:13 AM 1/23/2025     8:47 AM 2/10/2025    10:22 PM 3/3/2025    10:45 AM 4/9/2025     8:50 AM 5/13/2025     7:56 PM   CHAVO-7 SCORE   Total Score 17 (severe anxiety) 13 (moderate anxiety) 11 (moderate anxiety) 14 (moderate anxiety) 16 (severe anxiety) 12 (moderate anxiety) 13 (moderate anxiety)   Total Score 17  13  11  14  16  12  13        Patient-reported     CAGE-AID:       6/22/2020     7:12 PM 1/18/2022    11:15 PM 6/6/2024     8:00 AM   CAGE-AID Total Score   Total Score 4 4 4   Total Score MyChart 4 (A total score of 2 or greater is considered clinically significant) 4 (A total score of 2 or greater is considered clinically significant)      PROMIS 10-Global Health (all questions and answers displayed):       11/26/2024     5:00 PM 12/4/2024     9:28 AM 12/18/2024     9:10 AM 12/30/2024     5:44 PM 4/1/2025    11:00 AM 4/9/2025     9:00 AM 4/21/2025    10:39 AM   PROMIS 10   In general, would you say your health is:  Good Fair Fair  Fair Fair   In general, would you say your quality of life is:  Poor Fair Poor  Poor Poor   In general, how would you rate your physical health?  Good  Fair Fair  Fair Poor   In general, how would you rate your mental health, including your mood and your ability to think?  Fair Fair Poor  Fair Fair   In general, how would you rate your satisfaction with your social activities and relationships?  Poor Poor Poor  Poor Poor   In general, please rate how well you carry out your usual social activities and roles  Poor Poor Poor  Poor Poor   To what extent are you able to carry out your everyday physical activities such as walking, climbing stairs, carrying groceries, or moving a chair?  Moderately Moderately Moderately  Moderately Moderately   In the past 7 days, how often have you been bothered by emotional problems such as feeling anxious, depressed, or irritable?  Often Often Often  Often Often   In the past 7 days, how would you rate your fatigue on average?  Moderate Moderate Moderate  Moderate Moderate   In the past 7 days, how would you rate your pain on average, where 0 means no pain, and 10 means worst imaginable pain?  8 7 8  8 8   In general, would you say your health is:  3 2 2 3 2 2   In general, would you say your quality of life is:  1 2 1 1 1 1   In general, how would you rate your physical health?  3 2 2 2 2 1   In general, how would you rate your mental health, including your mood and your ability to think?  2 2 1 2 2 2   In general, how would you rate your satisfaction with your social activities and relationships?  1 1 1 2 1 1   In general, please rate how well you carry out your usual social activities and roles. (This includes activities at home, at work and in your community, and responsibilities as a parent, child, spouse, employee, friend, etc.)  1 1 1 1 1 1   To what extent are you able to carry out your everyday physical activities such as walking, climbing stairs, carrying groceries, or moving a chair?  3 3 3 3 3 3   In the past 7 days, how often have you been bothered by emotional problems such as feeling anxious, depressed, or irritable?  4  4 4 4 4 4   In the past 7 days, how would you rate your fatigue on average?  3 3 3 3 3 3   In the past 7 days, how would you rate your pain on average, where 0 means no pain, and 10 means worst imaginable pain?  8 7 8 8 8 8   Global Mental Health Score  6  7  5  7  6  6    Global Physical Health Score  11  10  10  10  10  9    PROMIS TOTAL - SUBSCORES  17  17  15  17  16  15        Information is confidential and restricted. Go to Review Flowsheets to unlock data.    Patient-reported     [place] Suicide Severity Rating Scale (Lifetime/Recent)      1/9/2023     1:00 PM 5/21/2024     4:49 PM 5/21/2024     9:00 PM 6/6/2024     8:00 AM 7/10/2024    12:00 PM 11/22/2024    11:05 AM 2/26/2025     5:59 PM   [place] Suicide Severity Rating (Lifetime/Recent)   Q1 Wish to be Dead (Lifetime)   Yes       Comments   OD on medications       Q2 Non-Specific Active Suicidal Thoughts (Lifetime)   No       Most Severe Ideation Rating (Lifetime)   5       Most Severe Ideation Description (Lifetime)   OD on medication       Frequency (Lifetime)   3       Duration (Lifetime)   3       Controllability (Lifetime)   2       Protective Factors  (Lifetime)   4       Reasons for Ideation (Lifetime)   5       Q1 Wished to be Dead (Past Month) yes  1-->yes 1-->yes 1-->yes  0-->no 0-->no   Q2 Suicidal Thoughts (Past Month) no  1-->yes 1-->yes 1-->yes  0-->no 0-->no   Q3 Suicidal Thought Method no  0-->no 0-->no 1-->yes      Q4 Suicidal Intent without Specific Plan no  1-->yes 0-->no 0-->no      Q5 Suicide Intent with Specific Plan no  0-->no 0-->no 1-->yes      Q6 Suicide Behavior (Lifetime) yes  1-->yes 0-->no 1-->yes  0-->no 0-->no   If yes to Q6, within past 3 months? no  1-->yes  0-->no  0-->no       Level of Risk per Screen moderate risk  high risk moderate risk high risk  no risks indicated no risks indicated   1. Wish to be Dead (Lifetime)     N     2. Non-Specific Active Suicidal Thoughts (Lifetime)     N         Data saved with a  previous flowsheet row definition         ASSESSMENT: Current Emotional / Mental Status (status of significant symptoms):   Risk status (Self / Other harm or suicidal ideation)   Patient denies current fears or concerns for personal safety.   Patient denies current or recent suicidal ideation or behaviors.   Patient denies current or recent homicidal ideation or behaviors.   Patient denies current or recent self injurious behavior or ideation.   Patient denies other safety concerns.   Patient reports there has been no change in risk factors since their last session.     Patient reports there has been no change in protective factors since their last session.     A safety and risk management plan has been developed including: Patient consented to co-developed safety plan on 6/4/24.  Safety and risk management plan was reviewed.   Patient agreed to use safety plan should any safety concerns arise.  A copy was made available to the patient.     Appearance:   Appropriate    Eye Contact:   Good    Psychomotor Behavior: Normal    Attitude:   Cooperative  Friendly   Orientation:   All   Speech    Rate / Production: Talkative    Volume:  Normal    Mood:    Anxious    Affect:    Appropriate    Thought Content:  Clear    Thought Form:  Goal Directed    Insight:    Good      Medication Review:   No changes to current psychiatric medication(s)     Medication Compliance:   Yes     Changes in Health Issues:   None reported     Chemical Use Review:   Substance Use: Chemical use reviewed, no active concerns identified      Tobacco Use: No current tobacco use.      Diagnosis:  1. 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 Generalized anxiety disorder        Collateral Reports Completed:   Not Applicable    PLAN: (Patient Tasks / Therapist Tasks / Other)  Continue following safety plan.  Focus on distress tolerance and self-care.     EWELINA Billingsley                                                          ______________________________________________________________________    Individual Treatment Plan    Patient's Name: Randi Cleary  YOB: 1955    Date of Creation: 7/17/2024  Date Treatment Plan Last Reviewed/Revised: 7/17/2024, 10/30/24, 2/3/2025, 5/7/2025     DSM5 Diagnoses:   296.33 (F33.2) Major Depressive Disorder, Recurrent Episode, Severe   300.02 (F41.1) Generalized Anxiety Disorder  Psychosocial / Contextual Factors: history of trauma  PROMIS (reviewed every 90 days):     Referral / Collaboration:  Discussed and patient will pursue a therapy group for depressive symptoms.    Anticipated number of session for this episode of care: 9-12 sessions  Anticipation frequency of session: Weekly  Anticipated Duration of each session: 38-52 minutes  Treatment plan will be reviewed in 90 days or when goals have been changed.       MeasurableTreatment Goal(s) related to diagnosis / functional impairment(s)  Goal 1: Client will keep self safe and eliminate suicidal ideation and self-harm behaviors.    Objective #A (Client Action)    Client will make a list of at least 10 skills or activities that you will to use to distract from urges to harm self.  Status: Completed - Date: 10/30/24      Intervention(s)  Therapist will provide ideas and strategies for generating coping skills list.    Objective #B  Client will make a list of pros and cons for tolerating and not tolerating an urge to harm self.  Status: Continued - Date(s): 5/7/2025     Intervention(s)  Therapist will guide client through DBT-style Pros/Cons list for behavior change.    Objective #C  Client will identify and practice the new strategies for dealing with strong emotions, learn and practice relaxation breathing.  Status: Continued - Date(s): 6/03/25      Intervention(s)  Therapist will teach distraction skills. Practice them within session and outside of session.    Goal 2: Client will report reduction in anxiety also  as evidenced by reduction of CHAVO-7 score below 6 points within the next 12 weeks.    Objective #A (Client Action)    Client will identify at least three triggers for anxiety.  Status: Completed - Date: 10/30/24      Intervention(s)  Therapist will provide educational materials on common triggers and signs of anxiety.    Objective #B  Client will use relaxation strategies at least two times per day to reduce the physical symptoms of anxiety.  Status: Continued - Date(s): 5/7/2025     Intervention(s)  Therapist will teach relaxation strategies such as mindfulness, deep breathing, muscle relaxation, and sensory activities.    Objective #C  Client will use cognitive strategies identified in therapy to challenge anxious thoughts.  Status: Continued - Date(s): 5/7/2025     Intervention(s)  Therapist will teach strategies for cognitive modification using REBT model.    Goal 3: Client will report improved mood as indicated by PHQ-9 score below 6 for consistent 8 weeks.    Objective #A (Client Action)    Status: Continued - Date: 5/7/2025     Client will Increase interest, engagement, and pleasure in doing things.    Intervention(s)  Therapist will assign homework for daily involvement in pleasant activities.    Objective #B  Client will Identify negative self-talk and behaviors: challenge core beliefs, myths, and actions.    Status: Continued - Date(s): 5/7/2025     Intervention(s)  Therapist will teach strategies for cognitive modification using REBT models.    Objective #C  Client will Improve quantity and quality of night time sleep / decrease daytime naps.  Status: Continued - Date(s): 5/7/2025     Intervention(s)  Therapist will teach sleep hygiene strategies.  Assign homework for daily practice.    Goal 4: Client will report reduced or eliminated physiological activation when recalling a distressing event including decreased re-experiencing, decreased avoidance, and decreased hyperarousal.    Objective #A (Client  Action)    Status: Continued: 5/7/25      Client will acquire knowledge of trauma, common symptoms post-trauma, emotion regulation strategies, stress management strategies, and cognitive coping strategies.    Intervention(s)  Therapist will teach about effects of trauma on mind and body; encourage emotion identification and expression; practice with client the stress management strategies; and teach cognitive modification.    Objective #B  Client will use Brainspotting while focusing on physiological sensations related to distressing events.  Client will assess and rate level of physiological activation.  Status: Continued - Date(s): 5/7/2025     Intervention(s)  Therapist will provide use a pointer or other materials to assist client in holding a brainspot in their visual field.  Therapist might also provide biolateral music through headphones to deepen the experience.         Patient has reviewed and agreed to the above plan.      Erika Colorado, LMFT  July 17, 2024

## 2025-06-09 ENCOUNTER — VIRTUAL VISIT (OUTPATIENT)
Dept: PSYCHOLOGY | Facility: CLINIC | Age: 70
End: 2025-06-09
Payer: MEDICARE

## 2025-06-09 ENCOUNTER — OFFICE VISIT (OUTPATIENT)
Dept: PALLIATIVE MEDICINE | Facility: OTHER | Age: 70
End: 2025-06-09
Attending: ANESTHESIOLOGY
Payer: MEDICARE

## 2025-06-09 VITALS — HEART RATE: 90 BPM | DIASTOLIC BLOOD PRESSURE: 68 MMHG | OXYGEN SATURATION: 99 % | SYSTOLIC BLOOD PRESSURE: 104 MMHG

## 2025-06-09 DIAGNOSIS — F41.1 GENERALIZED ANXIETY DISORDER: ICD-10-CM

## 2025-06-09 DIAGNOSIS — M19.071 OSTEOARTHRITIS OF RIGHT ANKLE AND FOOT: ICD-10-CM

## 2025-06-09 DIAGNOSIS — G95.20 CORD COMPRESSION (H): Chronic | ICD-10-CM

## 2025-06-09 DIAGNOSIS — F33.2 MAJOR DEPRESSIVE DISORDER, RECURRENT SEVERE WITHOUT PSYCHOTIC FEATURES (H): Primary | ICD-10-CM

## 2025-06-09 DIAGNOSIS — Z79.899 ENCOUNTER FOR LONG-TERM (CURRENT) USE OF MEDICATIONS: Primary | ICD-10-CM

## 2025-06-09 LAB
CANNABINOIDS UR QL SCN: ABNORMAL
CREAT UR-MCNC: 92 MG/DL
ETHANOL UR QL SCN: NORMAL

## 2025-06-09 PROCEDURE — 90837 PSYTX W PT 60 MINUTES: CPT | Mod: 95 | Performed by: MARRIAGE & FAMILY THERAPIST

## 2025-06-09 PROCEDURE — 3078F DIAST BP <80 MM HG: CPT | Performed by: ANESTHESIOLOGY

## 2025-06-09 PROCEDURE — G2211 COMPLEX E/M VISIT ADD ON: HCPCS | Performed by: ANESTHESIOLOGY

## 2025-06-09 PROCEDURE — G0463 HOSPITAL OUTPT CLINIC VISIT: HCPCS | Performed by: ANESTHESIOLOGY

## 2025-06-09 PROCEDURE — 99214 OFFICE O/P EST MOD 30 MIN: CPT | Performed by: ANESTHESIOLOGY

## 2025-06-09 PROCEDURE — 3074F SYST BP LT 130 MM HG: CPT | Performed by: ANESTHESIOLOGY

## 2025-06-09 PROCEDURE — 80307 DRUG TEST PRSMV CHEM ANLYZR: CPT | Performed by: ANESTHESIOLOGY

## 2025-06-09 PROCEDURE — 1125F AMNT PAIN NOTED PAIN PRSNT: CPT | Performed by: ANESTHESIOLOGY

## 2025-06-09 PROCEDURE — 80324 DRUG SCREEN AMPHETAMINES 1/2: CPT | Performed by: ANESTHESIOLOGY

## 2025-06-09 RX ORDER — COLCHICINE 0.6 MG/1
TABLET ORAL
Qty: 3 TABLET | Refills: 1 | Status: SHIPPED | OUTPATIENT
Start: 2025-06-09

## 2025-06-09 RX ORDER — COLCHICINE 0.6 MG/1
TABLET ORAL
Qty: 3 TABLET | Refills: 1 | Status: SHIPPED | OUTPATIENT
Start: 2025-06-09 | End: 2025-06-09

## 2025-06-09 RX ORDER — OXYCODONE HYDROCHLORIDE 5 MG/1
5 TABLET ORAL
Qty: 70 TABLET | Refills: 0 | Status: SHIPPED | OUTPATIENT
Start: 2025-06-09

## 2025-06-09 RX ORDER — OXYCODONE HYDROCHLORIDE 5 MG/1
5 TABLET ORAL
Qty: 70 TABLET | Refills: 0 | Status: SHIPPED | OUTPATIENT
Start: 2025-06-09 | End: 2025-06-09

## 2025-06-09 ASSESSMENT — PAIN SCALES - GENERAL: PAINLEVEL_OUTOF10: SEVERE PAIN (9)

## 2025-06-09 NOTE — PROGRESS NOTES
[facility]                                     Progress Note    Patient Name: Randi Cleary  Date: 2025            Service Type: Individual      Session Start Time:  2:32  Session End Time: 3:33     Session Length: 53+    Session #: 31    Attendees: Client    Service Modality:  Video Visit:      Provider verified identity through the following two step process.  Patient provided:  Patient  and Patient is known previously to provider    Telemedicine Visit: The patient's condition can be safely assessed and treated via synchronous audio and visual telemedicine encounter.      Reason for Telemedicine Visit: Patient has requested telehealth visit    Originating Site (Patient Location): [facility]: [place]    Distant Site (Provider Location): Provider Remote Setting- Home Office    Consent:  The patient/guardian has verbally consented to: the potential risks and benefits of telemedicine (video visit) versus in person care; bill my insurance or make self-payment for services provided; and responsibility for payment of non-covered services.     Patient would like the video invitation sent by:  My Chart    Mode of Communication:  Video Conference via [facility]    Distant Location (Provider):  Off-site    As the provider I attest to compliance with applicable laws and regulations related to telemedicine.        DATA  Interactive Complexity: No  Crisis: No        Progress Since Last Session (Related to Symptoms / Goals / Homework):   Symptoms: No change      Homework: n/a      Episode of Care Goals: Satisfactory progress - ACTION (Actively working towards change); Intervened by reinforcing change plan / affirming steps taken     Current / Ongoing Stressors and Concerns:   Overwhelmed with appointments today and there was also confusion about today's start time so she was waiting 30 minutes.  Reflecting on sadness and disappointment about finances including how her father's  " assisted was handled which had significant financial impact on her family.  Awareness of her desire to be heard and understood regarding these stressors.  Considering reaching out to elected officials.     Increased emotions and reflecting on early history which she described as a \"window has been opened.\"     Treatment Objective(s) Addressed in This Session:   Distress tolerance     Intervention:   Validation, safety assessment.  Encouragement.  Small steps toward larger goals. Discussion of generational traumas and psychoeducation about trauma.      Assessments completed prior to visit:  The following assessments were completed by patient for this visit:  PHQ9:       2/24/2025     9:43 AM 3/3/2025    10:28 AM 3/5/2025    11:44 AM 3/11/2025     8:36 PM 4/18/2025    10:11 AM 4/21/2025    10:36 AM 5/13/2025     7:54 PM   PHQ-9 SCORE   PHQ-9 Total Score MyChart 15 (Moderately severe depression) 15 (Moderately severe depression) 15 (Moderately severe depression) 14 (Moderate depression) 16 (Moderately severe depression) 16 (Moderately severe depression) 13 (Moderate depression)   PHQ-9 Total Score 15  15  15  14  16  16  13        Patient-reported     GAD7:       12/3/2024     9:10 AM 12/17/2024     9:13 AM 1/23/2025     8:47 AM 2/10/2025    10:22 PM 3/3/2025    10:45 AM 4/9/2025     8:50 AM 5/13/2025     7:56 PM   CHAVO-7 SCORE   Total Score 17 (severe anxiety) 13 (moderate anxiety) 11 (moderate anxiety) 14 (moderate anxiety) 16 (severe anxiety) 12 (moderate anxiety) 13 (moderate anxiety)   Total Score 17  13  11  14  16  12  13        Patient-reported     CAGE-AID:       6/22/2020     7:12 PM 1/18/2022    11:15 PM 6/6/2024     8:00 AM   CAGE-AID Total Score   Total Score 4 4 4   Total Score MyChart 4 (A total score of 2 or greater is considered clinically significant) 4 (A total score of 2 or greater is considered clinically significant)      PROMIS 10-Global Health (all questions and answers displayed): "       11/26/2024     5:00 PM 12/4/2024     9:28 AM 12/18/2024     9:10 AM 12/30/2024     5:44 PM 4/1/2025    11:00 AM 4/9/2025     9:00 AM 4/21/2025    10:39 AM   PROMIS 10   In general, would you say your health is:  Good Fair Fair  Fair Fair   In general, would you say your quality of life is:  Poor Fair Poor  Poor Poor   In general, how would you rate your physical health?  Good Fair Fair  Fair Poor   In general, how would you rate your mental health, including your mood and your ability to think?  Fair Fair Poor  Fair Fair   In general, how would you rate your satisfaction with your social activities and relationships?  Poor Poor Poor  Poor Poor   In general, please rate how well you carry out your usual social activities and roles  Poor Poor Poor  Poor Poor   To what extent are you able to carry out your everyday physical activities such as walking, climbing stairs, carrying groceries, or moving a chair?  Moderately Moderately Moderately  Moderately Moderately   In the past 7 days, how often have you been bothered by emotional problems such as feeling anxious, depressed, or irritable?  Often Often Often  Often Often   In the past 7 days, how would you rate your fatigue on average?  Moderate Moderate Moderate  Moderate Moderate   In the past 7 days, how would you rate your pain on average, where 0 means no pain, and 10 means worst imaginable pain?  8 7 8  8 8   In general, would you say your health is:  3 2 2 3 2 2   In general, would you say your quality of life is:  1 2 1 1 1 1   In general, how would you rate your physical health?  3 2 2 2 2 1   In general, how would you rate your mental health, including your mood and your ability to think?  2 2 1 2 2 2   In general, how would you rate your satisfaction with your social activities and relationships?  1 1 1 2 1 1   In general, please rate how well you carry out your usual social activities and roles. (This includes activities at home, at work and in your  community, and responsibilities as a parent, child, spouse, employee, friend, etc.)  1 1 1 1 1 1   To what extent are you able to carry out your everyday physical activities such as walking, climbing stairs, carrying groceries, or moving a chair?  3 3 3 3 3 3   In the past 7 days, how often have you been bothered by emotional problems such as feeling anxious, depressed, or irritable?  4 4 4 4 4 4   In the past 7 days, how would you rate your fatigue on average?  3 3 3 3 3 3   In the past 7 days, how would you rate your pain on average, where 0 means no pain, and 10 means worst imaginable pain?  8 7 8 8 8 8   Global Mental Health Score  6  7  5  7  6  6    Global Physical Health Score  11  10  10  10  10  9    PROMIS TOTAL - SUBSCORES  17  17  15  17  16  15        Information is confidential and restricted. Go to Review Flowsheets to unlock data.    Patient-reported     [place] Suicide Severity Rating Scale (Lifetime/Recent)      1/9/2023     1:00 PM 5/21/2024     4:49 PM 5/21/2024     9:00 PM 6/6/2024     8:00 AM 7/10/2024    12:00 PM 11/22/2024    11:05 AM 2/26/2025     5:59 PM   [place] Suicide Severity Rating (Lifetime/Recent)   Q1 Wish to be Dead (Lifetime)   Yes       Comments   OD on medications       Q2 Non-Specific Active Suicidal Thoughts (Lifetime)   No       Most Severe Ideation Rating (Lifetime)   5       Most Severe Ideation Description (Lifetime)   OD on medication       Frequency (Lifetime)   3       Duration (Lifetime)   3       Controllability (Lifetime)   2       Protective Factors  (Lifetime)   4       Reasons for Ideation (Lifetime)   5       Q1 Wished to be Dead (Past Month) yes  1-->yes 1-->yes 1-->yes  0-->no 0-->no   Q2 Suicidal Thoughts (Past Month) no  1-->yes 1-->yes 1-->yes  0-->no 0-->no   Q3 Suicidal Thought Method no  0-->no 0-->no 1-->yes      Q4 Suicidal Intent without Specific Plan no  1-->yes 0-->no 0-->no      Q5 Suicide Intent with Specific Plan no  0-->no 0-->no 1-->yes       Q6 Suicide Behavior (Lifetime) yes  1-->yes 0-->no 1-->yes  0-->no 0-->no   If yes to Q6, within past 3 months? no  1-->yes  0-->no  0-->no       Level of Risk per Screen moderate risk  high risk moderate risk high risk  no risks indicated no risks indicated   1. Wish to be Dead (Lifetime)     N     2. Non-Specific Active Suicidal Thoughts (Lifetime)     N         Data saved with a previous flowsheet row definition         ASSESSMENT: Current Emotional / Mental Status (status of significant symptoms):   Risk status (Self / Other harm or suicidal ideation)   Patient denies current fears or concerns for personal safety.   Patient denies current or recent suicidal ideation or behaviors.   Patient denies current or recent homicidal ideation or behaviors.   Patient denies current or recent self injurious behavior or ideation.   Patient denies other safety concerns.   Patient reports there has been no change in risk factors since their last session.     Patient reports there has been no change in protective factors since their last session.     A safety and risk management plan has been developed including: Patient consented to co-developed safety plan on 6/4/24.  Safety and risk management plan was reviewed.   Patient agreed to use safety plan should any safety concerns arise.  A copy was made available to the patient.     Appearance:   Appropriate    Eye Contact:   Good    Psychomotor Behavior: Retarded (Slowed)    Attitude:   Cooperative  Friendly   Orientation:   All   Speech    Rate / Production: Talkative    Volume:  Normal    Mood:    Anxious  Sad    Affect:    Subdued  Tearful   Thought Content:  Clear    Thought Form:  Goal Directed    Insight:    Good      Medication Review:   No changes to current psychiatric medication(s)     Medication Compliance:   Yes     Changes in Health Issues:   None reported     Chemical Use Review:   Substance Use: Chemical use reviewed, no active concerns identified      Tobacco  Use: No current tobacco use.      Diagnosis:  1. 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 Generalized anxiety disorder        Collateral Reports Completed:   Not Applicable    PLAN: (Patient Tasks / Therapist Tasks / Other)  Continue following safety plan.  Focus on distress tolerance and self-care.     Erika Colorado, LMFT                                                         ______________________________________________________________________    Individual Treatment Plan    Patient's Name: Randi Cleary  YOB: 1955    Date of Creation: 7/17/2024  Date Treatment Plan Last Reviewed/Revised: 7/17/2024, 10/30/24, 2/3/2025, 5/7/2025     DSM5 Diagnoses:   296.33 (F33.2) Major Depressive Disorder, Recurrent Episode, Severe   300.02 (F41.1) Generalized Anxiety Disorder  Psychosocial / Contextual Factors: history of trauma  PROMIS (reviewed every 90 days):     Referral / Collaboration:  Discussed and patient will pursue a therapy group for depressive symptoms.    Anticipated number of session for this episode of care: 9-12 sessions  Anticipation frequency of session: Weekly  Anticipated Duration of each session: 38-52 minutes  Treatment plan will be reviewed in 90 days or when goals have been changed.       MeasurableTreatment Goal(s) related to diagnosis / functional impairment(s)  Goal 1: Client will keep self safe and eliminate suicidal ideation and self-harm behaviors.    Objective #A (Client Action)    Client will make a list of at least 10 skills or activities that you will to use to distract from urges to harm self.  Status: Completed - Date: 10/30/24      Intervention(s)  Therapist will provide ideas and strategies for generating coping skills list.    Objective #B  Client will make a list of pros and cons for tolerating and not tolerating an urge to harm self.  Status: Continued - Date(s): 5/7/2025     Intervention(s)  Therapist will guide client  through DBT-style Pros/Cons list for behavior change.    Objective #C  Client will identify and practice the new strategies for dealing with strong emotions, learn and practice relaxation breathing.  Status: Continued - Date(s): 6/09/25      Intervention(s)  Therapist will teach distraction skills. Practice them within session and outside of session.    Goal 2: Client will report reduction in anxiety also as evidenced by reduction of CHAVO-7 score below 6 points within the next 12 weeks.    Objective #A (Client Action)    Client will identify at least three triggers for anxiety.  Status: Completed - Date: 10/30/24      Intervention(s)  Therapist will provide educational materials on common triggers and signs of anxiety.    Objective #B  Client will use relaxation strategies at least two times per day to reduce the physical symptoms of anxiety.  Status: Continued - Date(s): 5/7/2025     Intervention(s)  Therapist will teach relaxation strategies such as mindfulness, deep breathing, muscle relaxation, and sensory activities.    Objective #C  Client will use cognitive strategies identified in therapy to challenge anxious thoughts.  Status: Continued - Date(s): 5/7/2025     Intervention(s)  Therapist will teach strategies for cognitive modification using REBT model.    Goal 3: Client will report improved mood as indicated by PHQ-9 score below 6 for consistent 8 weeks.    Objective #A (Client Action)    Status: Continued - Date: 5/7/2025     Client will Increase interest, engagement, and pleasure in doing things.    Intervention(s)  Therapist will assign homework for daily involvement in pleasant activities.    Objective #B  Client will Identify negative self-talk and behaviors: challenge core beliefs, myths, and actions.    Status: Continued - Date(s): 5/7/2025     Intervention(s)  Therapist will teach strategies for cognitive modification using REBT models.    Objective #C  Client will Improve quantity and quality of  night time sleep / decrease daytime naps.  Status: Continued - Date(s): 5/7/2025     Intervention(s)  Therapist will teach sleep hygiene strategies.  Assign homework for daily practice.    Goal 4: Client will report reduced or eliminated physiological activation when recalling a distressing event including decreased re-experiencing, decreased avoidance, and decreased hyperarousal.    Objective #A (Client Action)    Status: Continued: 5/7/25      Client will acquire knowledge of trauma, common symptoms post-trauma, emotion regulation strategies, stress management strategies, and cognitive coping strategies.    Intervention(s)  Therapist will teach about effects of trauma on mind and body; encourage emotion identification and expression; practice with client the stress management strategies; and teach cognitive modification.    Objective #B  Client will use Brainspotting while focusing on physiological sensations related to distressing events.  Client will assess and rate level of physiological activation.  Status: Continued - Date(s): 5/7/2025     Intervention(s)  Therapist will provide use a pointer or other materials to assist client in holding a brainspot in their visual field.  Therapist might also provide biolateral music through headphones to deepen the experience.         Patient has reviewed and agreed to the above plan.      Erika Colorado, LMFT  July 17, 2024

## 2025-06-09 NOTE — PROGRESS NOTES
[facility] Follow-up    Date of visit: 2025    Chief complaint:   Chief Complaint   Patient presents with    Pain    Pain Management       I follow-up for persistent spinal pain.    Reviewing the record continues at the ECT program.    She reviews today things are about status quo.  Acknowledged our discussion about diet soda down to 1 a day working on decreasing.    Has had which she thinks are gout attacks, demonstrates her right index PIP is inflamed.  Her shoulders can flareup.  She had been on allopurinol in the past.  Uric acid 7 elevated several months ago, not recently.  Had not tried colchicine.    Reviews with the ECT now monthly.  Reviews a stressor is what is happening in the world, its impact on her [facility] savings.  This brings up memories of her father who  at 56, and did not have [facility] kicking in such that she had to drop out of college.    She is working on her concern for her feet, notes 1 foot now is 1-1/2 sizes different.  Her goal is to walk more and get into the pool.    She is use some medical cannabis which helps her sleep at night.    Uses the oxycodone 5 mg 5 times a day, would like to decrease some time.    She is followed by hematology for hematocrit with ptosis, reviews they may refer to a hand specialist, she is concerned also developing some atrophy in the muscles of the hand.    Taking magnesium at bedtime.  Restless legs can still be a challenge.     reviewed, last urine drug test  1 year ago so we will be obtaining          Medications:  Current Outpatient Medications   Medication Sig Dispense Refill    acetaminophen (TYLENOL) 325 MG tablet Take 325-650 mg by mouth every 6 hours as needed for mild pain.      albuterol (VENTOLIN HFA) 108 (90 Base) MCG/ACT inhaler Inhale 2 puffs into the lungs every 6 hours as needed for shortness of breath, wheezing or cough 18 g 11    benzonatate (TESSALON) 100 MG capsule  Take 1 capsule (100 mg) by mouth 3 times daily as needed for cough. 90 capsule 1    colchicine (COLCRYS) 0.6 MG tablet Two tablets now, and one tablet after six hours 3 tablet 1    ethacrynic acid (EDECRIN) 25 MG tablet Take 1 tablet (25 mg) by mouth daily. 30 tablet 0    fluticasone-salmeterol (ADVAIR) 250-50 MCG/ACT inhaler Inhale 1 puff into the lungs every 12 hours. 60 each 2    fluticasone-vilanterol (BREO ELLIPTA) 100-25 MCG/ACT inhaler Inhale 1 puff into the lungs daily. 60 each 3    gabapentin (NEURONTIN) 100 MG capsule Take 1-3 capsules (100-300 mg) by mouth 3 times daily. 90 capsule 2    guaiFENesin (MUCINEX) 600 MG 12 hr tablet Take 600 mg by mouth every morning.      ketoconazole (NIZORAL) 2 % external shampoo Apply topically daily as needed.      KLOR-CON M20 20 MEQ CR tablet Take 1 tablet (20 mEq) by mouth every morning. 90 tablet 3    Lavender Oil 80 MG CAPS Take 160 mg by mouth at bedtime.      medical cannabis (Patient's own supply) See Admin Instructions (The purpose of this order is to document that the patient reports taking medical cannabis.  This is not a prescription, and is not used to certify that the patient has a qualifying medical condition.)  Flower      melatonin 1 MG CAPS Take 1 mg by mouth at bedtime.      naloxone (NARCAN) 4 MG/0.1ML nasal spray Spray 1 spray (4 mg) into one nostril alternating nostrils as needed for opioid reversal every 2-3 minutes until assistance arrives 0.2 mL 0    omeprazole (PRILOSEC) 20 MG DR capsule Take 1 capsule (20 mg) by mouth as needed. 90 capsule 3    oxyCODONE (ROXICODONE) 5 MG tablet Take 1 tablet (5 mg) by mouth 5 times daily. May dispense 6/10 for 6/11 70 tablet 0    rOPINIRole (REQUIP) 2 MG tablet Take 1 tablet (2 mg) by mouth 3 times daily. 270 tablet 3    STATIN NOT PRESCRIBED (INTENTIONAL) Please choose reason not prescribed from choices below.      SYNTHROID 150 MCG tablet Take 1 tablet (150 mcg) by mouth every morning. MON to SAT 1  tablet/day; SUN 0.5 tablet - 90 tablet 4    UNABLE TO FIND Take 1 tablet by mouth every morning. MEDICATION NAME: CETYL-MYRISFOLEATE      vitamin C (ASCORBIC ACID) 1000 MG TABS Take 1,000 mg by mouth every morning.      vitamin D3 (CHOLECALCIFEROL) 250 mcg (64041 units) capsule Take 1 capsule by mouth once a week. SUNDAY'S      Cyanocobalamin (VITAMIN B-12) 5000 MCG SUBL Place 2-3 sprays under the tongue every morning. Unknown dose. 2 or 3 sprays/day      MAGNESIUM PO Take 2 capsules by mouth 2 times daily. Strength unknown             Physical Exam:  Blood pressure 104/68, pulse 90, SpO2 99%, not currently breastfeeding.    She is alert, clear sensorium, ambulates with a cane.  Right index PIP indeed appears enlarged, is not erythematous.    Affect much brighter than when past seen.              Assessment:   History of persistent spinal pain, using the oxycodone 5 mg 5 times a day with some benefit, medical cannabis at bedtime.    May be having flareup with gout, describes has not changed her diet in terms of proteins does not have fruit juice.    Plan: Will add a uric acid to labs to be obtained through her primary care provider.    I did give a prescription for short trial of colchicine.    Discussed probiotics that may be helpful for her gut terms of the neurotransmitters, testosterone.    She will increase her magnesium to 3 times a day using magnesium glycinate.    She will increase cetyl Myistoleate to 2 capsules twice a day.    Follow-up here in 3 months, total time 35 minutes  The longitudinal plan of care for the diagnosis(es)/condition(s) as documented were addressed during this visit. Due to the added complexity in care, I will continue to support Winnie in the subsequent management and with ongoing continuity of care.     minutes spent on the date of encounter doing chart review, history, and exam documentation and further activities as noted above.     Dusty Dubois MD  [facility]  Pain

## 2025-06-09 NOTE — PROGRESS NOTES
Patient presents to the clinic today for a visit  with AXEL WIGGINS MD            3/10/2025     8:19 AM 3/10/2025     8:21 AM 6/9/2025     1:06 PM   PEG Score   PEG Total Score 8 8 6.67       UDS/CSA-8/01/2024    Medications-oxycodone     QUESTIONS:    Maryan Temple MA  [facility] [place]

## 2025-06-09 NOTE — PATIENT INSTRUCTIONS
"[facility] Number:  [phone]  Call with any questions about your care and for scheduling assistance.   Calls are returned Monday through Friday between 8 AM and 4:30 PM. We usually get back to you within 2 business days depending on the issue/request.    If we are prescribing your medications:  For opioid medication refills, call the clinic or send a [employer] message 7 days in advance.  Please include:  Name of requested medication  Name of the pharmacy.  For non-opioid medications, call your pharmacy directly to request a refill. Please allow 3-4 days to be processed.   Per [place] State Law:  All controlled substance prescriptions must be filled within 30 days of being written.    For those controlled substances allowing refills, pickup must occur within 30 days of last fill.      We believe regular attendance is key to your success in our program!    Any time you are unable to keep your appointment we ask that you call us at least 24 hours in advance to cancel.This will allow us to offer the appointment time to another patient.   Multiple missed appointments may lead to dismissal from the clinic.     PLAN:    Colchicine for your gout flare 0.6 mg tablets take 2 tablets now and 6:01 hours to see if that helps.        Uric acid ordered to your other labs from your provider.    Discussed you may get a referral to hand specialist.    May increase to cetyl Myristoleate to 2 capsules twice a day for a 2-week trial.    Discussed the probiotic \"L. Reuteri\", you may obtain online or at [employer] food stores to help with your bacteria making neurotransmitters.  Open the capsule and stir into a cup of juice or apple cider.  Let ferment on the counter for 24 hours and drink.  Do this daily for 30 capsules.    Increase your magnesium supplement to 3 times a day does help with restless legs.    Continue the oxycodone 5 mg 5 times daily.    Continue the medical cannabis " preparation.    Follow-up with Dr. Dubois for return visit in 3 months

## 2025-06-09 NOTE — LETTER

## 2025-06-10 ENCOUNTER — MYC REFILL (OUTPATIENT)
Dept: PALLIATIVE MEDICINE | Facility: OTHER | Age: 70
End: 2025-06-10

## 2025-06-10 ENCOUNTER — LAB (OUTPATIENT)
Dept: LAB | Facility: CLINIC | Age: 70
End: 2025-06-10
Payer: MEDICARE

## 2025-06-10 ENCOUNTER — MYC REFILL (OUTPATIENT)
Dept: PSYCHIATRY | Facility: CLINIC | Age: 70
End: 2025-06-10

## 2025-06-10 ENCOUNTER — MYC REFILL (OUTPATIENT)
Dept: INTERNAL MEDICINE | Facility: CLINIC | Age: 70
End: 2025-06-10

## 2025-06-10 ENCOUNTER — MYC MEDICAL ADVICE (OUTPATIENT)
Dept: PSYCHIATRY | Facility: CLINIC | Age: 70
End: 2025-06-10

## 2025-06-10 DIAGNOSIS — F41.1 GENERALIZED ANXIETY DISORDER: ICD-10-CM

## 2025-06-10 DIAGNOSIS — E83.119 HEMOCHROMATOSIS, UNSPECIFIED HEMOCHROMATOSIS TYPE: ICD-10-CM

## 2025-06-10 DIAGNOSIS — M19.071 OSTEOARTHRITIS OF RIGHT ANKLE AND FOOT: ICD-10-CM

## 2025-06-10 DIAGNOSIS — G95.20 CORD COMPRESSION (H): Chronic | ICD-10-CM

## 2025-06-10 DIAGNOSIS — J42 CHRONIC BRONCHITIS, UNSPECIFIED CHRONIC BRONCHITIS TYPE (H): ICD-10-CM

## 2025-06-10 DIAGNOSIS — E89.0 POSTABLATIVE HYPOTHYROIDISM: ICD-10-CM

## 2025-06-10 LAB
ALBUMIN SERPL BCG-MCNC: 4.1 G/DL (ref 3.5–5.2)
ALP SERPL-CCNC: 61 U/L (ref 40–150)
ALT SERPL W P-5'-P-CCNC: 19 U/L (ref 0–50)
ANION GAP SERPL CALCULATED.3IONS-SCNC: 8 MMOL/L (ref 7–15)
AST SERPL W P-5'-P-CCNC: 27 U/L (ref 0–45)
BASOPHILS # BLD AUTO: 0 10E3/UL (ref 0–0.2)
BASOPHILS NFR BLD AUTO: 1 %
BILIRUB SERPL-MCNC: 0.5 MG/DL
BUN SERPL-MCNC: 15.5 MG/DL (ref 8–23)
CALCIUM SERPL-MCNC: 9.3 MG/DL (ref 8.8–10.4)
CHLORIDE SERPL-SCNC: 103 MMOL/L (ref 98–107)
CREAT SERPL-MCNC: 0.57 MG/DL (ref 0.51–0.95)
EGFRCR SERPLBLD CKD-EPI 2021: >90 ML/MIN/1.73M2
EOSINOPHIL # BLD AUTO: 0.1 10E3/UL (ref 0–0.7)
EOSINOPHIL NFR BLD AUTO: 2 %
ERYTHROCYTE [DISTWIDTH] IN BLOOD BY AUTOMATED COUNT: 11.8 % (ref 10–15)
ERYTHROCYTE [SEDIMENTATION RATE] IN BLOOD BY WESTERGREN METHOD: 8 MM/HR (ref 0–30)
FERRITIN SERPL-MCNC: 155 NG/ML (ref 11–328)
GLUCOSE SERPL-MCNC: 85 MG/DL (ref 70–99)
HCO3 SERPL-SCNC: 28 MMOL/L (ref 22–29)
HCT VFR BLD AUTO: 45.5 % (ref 35–47)
HGB BLD-MCNC: 15.9 G/DL (ref 11.7–15.7)
IMM GRANULOCYTES # BLD: 0 10E3/UL
IMM GRANULOCYTES NFR BLD: 0 %
IRON BINDING CAPACITY (ROCHE): ABNORMAL
IRON SATN MFR SERPL: ABNORMAL %
IRON SERPL-MCNC: 242 UG/DL (ref 37–145)
LDH SERPL L TO P-CCNC: 220 U/L (ref 0–250)
LYMPHOCYTES # BLD AUTO: 1.3 10E3/UL (ref 0.8–5.3)
LYMPHOCYTES NFR BLD AUTO: 28 %
MCH RBC QN AUTO: 33.6 PG (ref 26.5–33)
MCHC RBC AUTO-ENTMCNC: 34.9 G/DL (ref 31.5–36.5)
MCV RBC AUTO: 96 FL (ref 78–100)
MONOCYTES # BLD AUTO: 0.4 10E3/UL (ref 0–1.3)
MONOCYTES NFR BLD AUTO: 8 %
NEUTROPHILS # BLD AUTO: 2.8 10E3/UL (ref 1.6–8.3)
NEUTROPHILS NFR BLD AUTO: 62 %
PLATELET # BLD AUTO: 160 10E3/UL (ref 150–450)
POTASSIUM SERPL-SCNC: 4.8 MMOL/L (ref 3.4–5.3)
PROT SERPL-MCNC: 6.7 G/DL (ref 6.4–8.3)
RBC # BLD AUTO: 4.73 10E6/UL (ref 3.8–5.2)
RETICS # AUTO: 0.08 10E6/UL (ref 0.03–0.1)
RETICS/RBC NFR AUTO: 1.7 % (ref 0.5–2)
SODIUM SERPL-SCNC: 139 MMOL/L (ref 135–145)
T4 FREE SERPL-MCNC: 1.36 NG/DL (ref 0.9–1.7)
TSH SERPL DL<=0.005 MIU/L-ACNC: 0.83 UIU/ML (ref 0.3–4.2)
URATE SERPL-MCNC: 4.4 MG/DL (ref 2.4–5.7)
WBC # BLD AUTO: 4.6 10E3/UL (ref 4–11)

## 2025-06-10 PROCEDURE — 82728 ASSAY OF FERRITIN: CPT

## 2025-06-10 PROCEDURE — 83540 ASSAY OF IRON: CPT

## 2025-06-10 PROCEDURE — 85045 AUTOMATED RETICULOCYTE COUNT: CPT

## 2025-06-10 PROCEDURE — 85025 COMPLETE CBC W/AUTO DIFF WBC: CPT

## 2025-06-10 PROCEDURE — 84443 ASSAY THYROID STIM HORMONE: CPT

## 2025-06-10 PROCEDURE — 80053 COMPREHEN METABOLIC PANEL: CPT

## 2025-06-10 PROCEDURE — 85652 RBC SED RATE AUTOMATED: CPT

## 2025-06-10 PROCEDURE — 84439 ASSAY OF FREE THYROXINE: CPT

## 2025-06-10 PROCEDURE — 84550 ASSAY OF BLOOD/URIC ACID: CPT

## 2025-06-10 PROCEDURE — 36415 COLL VENOUS BLD VENIPUNCTURE: CPT

## 2025-06-10 PROCEDURE — 83615 LACTATE (LD) (LDH) ENZYME: CPT

## 2025-06-10 RX ORDER — OXYCODONE HYDROCHLORIDE 5 MG/1
5 TABLET ORAL
Qty: 70 TABLET | Refills: 0 | Status: CANCELLED | OUTPATIENT
Start: 2025-06-10

## 2025-06-10 RX ORDER — GABAPENTIN 100 MG/1
100-300 CAPSULE ORAL 3 TIMES DAILY
Qty: 90 CAPSULE | Refills: 2 | Status: CANCELLED | OUTPATIENT
Start: 2025-06-10

## 2025-06-10 RX ORDER — GABAPENTIN 100 MG/1
100-300 CAPSULE ORAL 3 TIMES DAILY
Qty: 90 CAPSULE | Refills: 2 | Status: SHIPPED | OUTPATIENT
Start: 2025-06-10

## 2025-06-11 ENCOUNTER — ANESTHESIA EVENT (OUTPATIENT)
Dept: BEHAVIORAL HEALTH | Facility: CLINIC | Age: 70
End: 2025-06-11

## 2025-06-11 ENCOUNTER — HOSPITAL ENCOUNTER (OUTPATIENT)
Dept: BEHAVIORAL HEALTH | Facility: CLINIC | Age: 70
Discharge: HOME | End: 2025-06-11
Attending: PSYCHIATRY & NEUROLOGY
Payer: MEDICARE

## 2025-06-11 ENCOUNTER — ANESTHESIA (OUTPATIENT)
Dept: BEHAVIORAL HEALTH | Facility: CLINIC | Age: 70
End: 2025-06-11
Payer: MEDICARE

## 2025-06-11 VITALS
OXYGEN SATURATION: 93 % | HEART RATE: 84 BPM | RESPIRATION RATE: 16 BRPM | SYSTOLIC BLOOD PRESSURE: 127 MMHG | TEMPERATURE: 97.4 F | DIASTOLIC BLOOD PRESSURE: 89 MMHG

## 2025-06-11 DIAGNOSIS — F33.2 SEVERE RECURRENT MAJOR DEPRESSION WITHOUT PSYCHOTIC FEATURES (H): Primary | ICD-10-CM

## 2025-06-11 LAB
GABAPENTIN UR QL CFM: PRESENT
OXYCODONE UR CFM-MCNC: 2780 NG/ML
OXYCODONE/CREAT UR: 3022 NG/MG {CREAT}
OXYMORPHONE UR CFM-MCNC: 1640 NG/ML
OXYMORPHONE/CREAT UR: 1783 NG/MG {CREAT}

## 2025-06-11 PROCEDURE — 250N000011 HC RX IP 250 OP 636: Mod: JZ | Performed by: STUDENT IN AN ORGANIZED HEALTH CARE EDUCATION/TRAINING PROGRAM

## 2025-06-11 PROCEDURE — 370N000017 HC ANESTHESIA TECHNICAL FEE, PER MIN: Performed by: STUDENT IN AN ORGANIZED HEALTH CARE EDUCATION/TRAINING PROGRAM

## 2025-06-11 PROCEDURE — 90870 ELECTROCONVULSIVE THERAPY: CPT | Performed by: STUDENT IN AN ORGANIZED HEALTH CARE EDUCATION/TRAINING PROGRAM

## 2025-06-11 PROCEDURE — 90870 ELECTROCONVULSIVE THERAPY: CPT

## 2025-06-11 RX ORDER — ONDANSETRON 4 MG/1
4 TABLET, ORALLY DISINTEGRATING ORAL EVERY 30 MIN PRN
Status: DISCONTINUED | OUTPATIENT
Start: 2025-06-11 | End: 2025-06-12 | Stop reason: HOSPADM

## 2025-06-11 RX ORDER — DEXAMETHASONE SODIUM PHOSPHATE 4 MG/ML
4 INJECTION, SOLUTION INTRA-ARTICULAR; INTRALESIONAL; INTRAMUSCULAR; INTRAVENOUS; SOFT TISSUE
Status: DISCONTINUED | OUTPATIENT
Start: 2025-06-11 | End: 2025-06-12 | Stop reason: HOSPADM

## 2025-06-11 RX ORDER — ONDANSETRON 2 MG/ML
4 INJECTION INTRAMUSCULAR; INTRAVENOUS EVERY 30 MIN PRN
Status: DISCONTINUED | OUTPATIENT
Start: 2025-06-11 | End: 2025-06-12 | Stop reason: HOSPADM

## 2025-06-11 RX ORDER — HYDROMORPHONE HCL IN WATER/PF 6 MG/30 ML
0.2 PATIENT CONTROLLED ANALGESIA SYRINGE INTRAVENOUS EVERY 5 MIN PRN
Status: DISCONTINUED | OUTPATIENT
Start: 2025-06-11 | End: 2025-06-12 | Stop reason: HOSPADM

## 2025-06-11 RX ORDER — SODIUM CHLORIDE, SODIUM LACTATE, POTASSIUM CHLORIDE, CALCIUM CHLORIDE 600; 310; 30; 20 MG/100ML; MG/100ML; MG/100ML; MG/100ML
INJECTION, SOLUTION INTRAVENOUS CONTINUOUS
Status: DISCONTINUED | OUTPATIENT
Start: 2025-06-11 | End: 2025-06-12 | Stop reason: HOSPADM

## 2025-06-11 RX ORDER — FLUTICASONE PROPIONATE AND SALMETEROL 250; 50 UG/1; UG/1
1 POWDER RESPIRATORY (INHALATION) EVERY 12 HOURS
Qty: 60 EACH | Refills: 2 | Status: SHIPPED | OUTPATIENT
Start: 2025-06-11

## 2025-06-11 RX ORDER — HYDROMORPHONE HCL IN WATER/PF 6 MG/30 ML
0.4 PATIENT CONTROLLED ANALGESIA SYRINGE INTRAVENOUS EVERY 5 MIN PRN
Status: DISCONTINUED | OUTPATIENT
Start: 2025-06-11 | End: 2025-06-12 | Stop reason: HOSPADM

## 2025-06-11 RX ORDER — KETOROLAC TROMETHAMINE 30 MG/ML
30 INJECTION, SOLUTION INTRAMUSCULAR; INTRAVENOUS ONCE
Start: 2025-06-11 | End: 2025-06-11

## 2025-06-11 RX ORDER — ACETAMINOPHEN 325 MG/1
975 TABLET ORAL ONCE
Status: DISCONTINUED | OUTPATIENT
Start: 2025-06-11 | End: 2025-06-12 | Stop reason: HOSPADM

## 2025-06-11 RX ORDER — NALOXONE HYDROCHLORIDE 0.4 MG/ML
0.1 INJECTION, SOLUTION INTRAMUSCULAR; INTRAVENOUS; SUBCUTANEOUS
Status: DISCONTINUED | OUTPATIENT
Start: 2025-06-11 | End: 2025-06-12 | Stop reason: HOSPADM

## 2025-06-11 RX ORDER — KETOROLAC TROMETHAMINE 30 MG/ML
30 INJECTION, SOLUTION INTRAMUSCULAR; INTRAVENOUS ONCE
Status: COMPLETED | OUTPATIENT
Start: 2025-06-11 | End: 2025-06-11

## 2025-06-11 RX ORDER — METHOHEXITAL IN WATER/PF 100MG/10ML
SYRINGE (ML) INTRAVENOUS PRN
Status: DISCONTINUED | OUTPATIENT
Start: 2025-06-11 | End: 2025-06-11

## 2025-06-11 RX ORDER — LABETALOL HYDROCHLORIDE 5 MG/ML
INJECTION, SOLUTION INTRAVENOUS PRN
Status: DISCONTINUED | OUTPATIENT
Start: 2025-06-11 | End: 2025-06-11

## 2025-06-11 RX ORDER — FENTANYL CITRATE 50 UG/ML
25 INJECTION, SOLUTION INTRAMUSCULAR; INTRAVENOUS EVERY 5 MIN PRN
Status: DISCONTINUED | OUTPATIENT
Start: 2025-06-11 | End: 2025-06-12 | Stop reason: HOSPADM

## 2025-06-11 RX ORDER — FENTANYL CITRATE 50 UG/ML
50 INJECTION, SOLUTION INTRAMUSCULAR; INTRAVENOUS EVERY 5 MIN PRN
Status: DISCONTINUED | OUTPATIENT
Start: 2025-06-11 | End: 2025-06-12 | Stop reason: HOSPADM

## 2025-06-11 RX ADMIN — KETOROLAC TROMETHAMINE 30 MG: 30 INJECTION, SOLUTION INTRAMUSCULAR; INTRAVENOUS at 13:21

## 2025-06-11 RX ADMIN — Medication 100 MG: at 13:35

## 2025-06-11 RX ADMIN — LABETALOL HYDROCHLORIDE 20 MG: 5 INJECTION, SOLUTION INTRAVENOUS at 13:35

## 2025-06-11 ASSESSMENT — COPD QUESTIONNAIRES: COPD: 1

## 2025-06-11 NOTE — ANESTHESIA POSTPROCEDURE EVALUATION
Patient: Randi Cleary    Procedure: * No procedures listed *  Electroconvulsive Therapy    Anesthesia Type:  General    Note:  Disposition: Outpatient   Postop Pain Control: Uneventful            Sign Out: Well controlled pain   PONV: No   Neuro/Psych: Uneventful            Sign Out: Acceptable/Baseline neuro status   Airway/Respiratory: Uneventful            Sign Out: Acceptable/Baseline resp. status   CV/Hemodynamics: Uneventful            Sign Out: Acceptable CV status; No obvious hypovolemia; No obvious fluid overload   Other NRE: NONE   DID A NON-ROUTINE EVENT OCCUR? No           Last vitals:  Vitals:    06/11/25 1312 06/11/25 1345 06/11/25 1400   BP: 124/71 112/61 109/54   Pulse: 77 67 77   Resp: 18 13 15   Temp: 36.2  C (97.2  F) 36  C (96.8  F) 36.3  C (97.4  F)   SpO2: 92% 92% 93%       Electronically Signed By: April Luciano MD  June 11, 2025  2:04 PM

## 2025-06-11 NOTE — ANESTHESIA CARE TRANSFER NOTE
Patient: Randi Cleary    Procedure: * No procedures listed *  Electroconvulsive Therapy    Diagnosis: * No pre-op diagnosis entered *  Diagnosis Additional Information: No value filed.    Anesthesia Type:   General     Note:    Oropharynx: oropharynx clear of all foreign objects  Level of Consciousness: drowsy  Oxygen Supplementation: room air    Independent Airway: airway patency satisfactory and stable  Dentition: dentition unchanged  Vital Signs Stable: post-procedure vital signs reviewed and stable  Report to RN Given: handoff report given  Patient transferred to: PACU    Handoff Report: Identifed the Patient, Identified the Reponsible Provider, Reviewed the pertinent medical history, Discussed the surgical course, Reviewed Intra-OP anesthesia mangement and issues during anesthesia, Set expectations for post-procedure period and Allowed opportunity for questions and acknowledgement of understanding      Vitals:  Vitals Value Taken Time   /54 06/11/25 14:00   Temp 36.3  C (97.4  F) 06/11/25 14:00   Pulse 77 06/11/25 14:00   Resp 15 06/11/25 14:00   SpO2 93 % 06/11/25 14:00       Electronically Signed By: April Luciano MD  June 11, 2025  2:04 PM

## 2025-06-11 NOTE — TELEPHONE ENCOUNTER
Rx was printed on 6/10/25 and given to patient upon leaving the clinic.  No need for e-script at this time

## 2025-06-11 NOTE — ANESTHESIA PREPROCEDURE EVALUATION
Anesthesia Pre-Procedure Evaluation    Patient: Randi Cleary   MRN: [id number] : 1955        Procedure :   Electroconvulsive Therapy       Past Medical History:   Diagnosis Date    Bipolar 2 disorder (H)     Breast cancer (H)     lumpectomy, radiation, chemo    Chronic pain syndrome     COPD (chronic obstructive pulmonary disease) (H)     asthma    Cord compression (H) 2021    Dizzy     Drug tolerance     opioid    Esophageal reflux     Fatigue     Generalized anxiety disorder     Graves disease 1994    Hemochromatosis 2018    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; H63D not detected    History of corticosteroid therapy 2019    History of partial adrenalectomy 2019    Hx antineoplastic chemotherapy     Hx of radiation therapy     Hyperlipidaemia     Hypertension     Impaired fasting glucose     Infection due to 2019 novel coronavirus 2021    Injury of neck, whiplash 07/15/2021    Joint pain     KYAW (obstructive sleep apnea) 2016    Osteopenia     Paroxysmal atrial fibrillation (H) 2024    Pheochromocytoma, left 2017    laparoscopically removed    Postablative hypothyroidism 1995    Prediabetes 10/03/2019    by A1c    Psoriasis     Psoriatic arthropathy (H)     Pulmonary hypertension (H)     Right rotator cuff tear     RLS (restless legs syndrome)     on ropinorole    Sacroiliitis     Serotonin syndrome 2020    [facility] - While on desvenlafaxine 100mg    Snoring     Spinal stenosis     Status post coronary angiogram 10/03/2019    Urinary incontinence     Vitamin B 12 deficiency     Vitamin D deficiency       Past Surgical History:   Procedure Laterality Date    ARTHRODESIS ANKLE      ARTHROPLASTY ANKLE Right 2015    Procedure: ARTHROPLASTY ANKLE;  Surgeon: Jason Coughlin MD;  Location: [facility]    ARTHROPLASTY REVISION ANKLE Right 2015    Procedure: ARTHROPLASTY REVISION ANKLE;  Surgeon: Jason Coughlin MD;  Location: [facility]    BIOPSY  BREAST      BREAST BIOPSY, CORE RT/LT      COLONOSCOPY      COLONOSCOPY N/A 2/25/2021    Procedure: COLONOSCOPY;  Surgeon: Guru Elke Tolbert MD;  Location:  GI    CV CORONARY ANGIOGRAM N/A 10/3/2019    Procedure: CV CORONARY ANGIOGRAM;  Surgeon: Bryce Pierre MD;  Location:  HEART CARDIAC CATH LAB    CV RIGHT HEART CATH MEASUREMENTS RECORDED N/A 10/3/2019    Procedure: CV RIGHT HEART CATH;  Surgeon: Bryce Pierre MD;  Location:  HEART CARDIAC CATH LAB    CV RIGHT HEART CATH MEASUREMENTS RECORDED N/A 9/25/2024    Procedure: Heart Cath Right Heart Cath;  Surgeon: George Becker MD;  Location:  HEART CARDIAC CATH LAB    ESOPHAGOSCOPY, GASTROSCOPY, DUODENOSCOPY (EGD), COMBINED N/A 2/25/2021    Procedure: ESOPHAGOGASTRODUODENOSCOPY, WITH BIOPSY;  Surgeon: Guru Elke Tolbert MD;  Location:  GI    EYE SURGERY  2021    HC REMOVE TONSILS/ADENOIDS,<13 Y/O      Description: Tonsillectomy With Adenoidectomy;  Recorded: 04/07/2010;    IR LUMBAR EPIDURAL STEROID INJECTION  10/26/2004    IR LUMBAR EPIDURAL STEROID INJECTION  11/16/2004    IR LUMBAR EPIDURAL STEROID INJECTION  12/21/2004    IR LUMBAR EPIDURAL STEROID INJECTION  6/8/2006    JOINT REPLACEMENT      LAMINOPLASTY CERVICAL POSTERIOR THREE+ LEVELS Left 12/21/2021    Procedure: CERVICAL 3-CERVICAL 6 LEFT OPEN DOOR LAMINOPLASTY AND LEFT CERVICAL 4-5 AND CERVICAL 6-7 POSTERIOR FORAMINOTOMY;  Surgeon: Angela Gregory MD;  Location: [facility] OR    LAPAROSCOPIC ADRENALECTOMY Left 08/02/2017    pheochromocytoma    LAPAROSCOPIC ADRENALECTOMY Left 8/2/2017    Procedure: LAPAROSCOPIC LEFT ADRENALECTOMY, ;  Surgeon: Gab Linares MD;  Location: [facility];  Service:     LENGTHEN TENDON ACHILLES Right 6/29/2015    Procedure: LENGTHEN TENDON ACHILLES;  Surgeon: Jason Coughlin MD;  Location: [facility]    LUMPECTOMY BREAST      LUMPECTOMY BREAST Left 1994    MAMMOPLASTY REDUCTION Right 01/13/2015     "[place]    MAMMOPLASTY REDUCTION Right     approx late [id number]    MASTECTOMY      left lumpectomy with axillary node dissection    MASTECTOMY MODIFIED RADICAL      OTHER SURGICAL HISTORY Right     reconstructive breast surgery    OTHER SURGICAL HISTORY      Adrenalectomy for pheochromocytoma    ID MASTECTOMY, MODIFIED RADICAL      Description: Modified Radical Mastectomy Left Breast;  Recorded: 2010;    REPAIR HAMMER TOE Right 2015    Procedure: REPAIR HAMMER TOE;  Surgeon: Jason Coughlin MD;  Location: [facility]    TONSILLECTOMY      TONSILLECTOMY & ADENOIDECTOMY      ZC ARTHRODESIS,ANKLE,OPEN Right     Description: Ankle Arthrodesis;  Recorded: 2010;      Allergies   Allergen Reactions    Serotonin Reuptake Inhibitors (Ssris) Anxiety, Difficulty breathing, Headache, Palpitations and Shortness Of Breath    Bupropion Unknown    Buspirone      The patient states she had serotonin syndrome    Cephalexin      Other reaction(s): unknown rxn.    Desvenlafaxine      Serotonin syndrome    Diclofenac Sodium [Diclofenac]      Serotonin syndrome and restless legs syndrome    Gabapentin      Drove on the wrong side of the highway    Levofloxacin      \"CAN'T REMEMBER\"    Penicillins      \"SORES IN MOUTH\"    Riluzole Difficulty breathing and Swelling    Riluzole Unknown    Sulfa Antibiotics      Chronic cough in sulfa containing diuretic     Topiramate Other (See Comments)     Frequent urination    Dextromethorphan Anxiety      Social History     Tobacco Use    Smoking status: Former     Current packs/day: 0.00     Average packs/day: 2.5 packs/day for 29.2 years (72.9 ttl pk-yrs)     Types: Cigarettes     Start date: 1971     Quit date: 2000     Years since quittin.9     Passive exposure: Current    Smokeless tobacco: Never   Substance Use Topics    Alcohol use: Not Currently     Comment: relapse 2021 sober       Wt Readings from Last 1 Encounters:   25 79.4 kg (175 lb)    "     Anesthesia Evaluation   Pt has had prior anesthetic.     History of anesthetic complications       ROS/MED HX  ENT/Pulmonary:     (+) sleep apnea,                         COPD,              Neurologic:       Cardiovascular: Comment: pAfib & pHTN per chart    (+)  hypertension- -   -  - -                                pulmonary hypertension, Previous cardiac testing   Echo: Date: 11/2024 Results:  Global and regional left ventricular function is normal with an EF of 55-60%.  Right ventricular function, chamber size, wall motion, and thickness are normal.  No significant valvular abnormalities were noted.  This study was compared with the study from 10/5/23. No significant changes noted.  Stress Test:  Date: Results:    ECG Reviewed:  Date: Results:    Cath:  Date: Results:      METS/Exercise Tolerance:     Hematologic: Comments: Hemochromatosis       Musculoskeletal: Comment: Osteopenia  Sacral joint pain  Psoriatic arthritis  (+)  arthritis,             GI/Hepatic:     (+) GERD,     hiatal hernia,              Renal/Genitourinary:       Endo:     (+)          thyroid problem,     Obesity,       Psychiatric/Substance Use:     (+) psychiatric history bipolar       Infectious Disease:       Malignancy: Comment: Breast cancer      Other:            Physical Exam    Airway  airway exam normal      Mallampati: II   TM distance: > 3 FB   Neck ROM: full   Mouth opening: > 3 cm    Respiratory Devices and Support         Dental       (+) Modest Abnormalities - crowns, retainers, 1 or 2 missing teeth      Cardiovascular   cardiovascular exam normal          Pulmonary   pulmonary exam normal                OUTSIDE LABS:  CBC:   Lab Results   Component Value Date    WBC 4.6 06/10/2025    WBC 5.8 02/25/2025    HGB 15.9 (H) 06/10/2025    HGB 15.9 (H) 05/01/2025    HCT 45.5 06/10/2025    HCT 48.7 (H) 02/25/2025     06/10/2025     02/25/2025     BMP:   Lab Results   Component Value Date     06/10/2025    NA  142 02/25/2025    POTASSIUM 4.8 06/10/2025    POTASSIUM 5.3 05/01/2025    CHLORIDE 103 06/10/2025    CHLORIDE 104 02/25/2025    CO2 28 06/10/2025    CO2 27 02/25/2025    BUN 15.5 06/10/2025    BUN 15.4 02/25/2025    CR 0.57 06/10/2025    CR 0.58 02/25/2025    GLC 85 06/10/2025    GLC 76 02/25/2025     COAGS:   Lab Results   Component Value Date    PTT 34 12/13/2021    INR 0.99 11/23/2024     POC:   Lab Results   Component Value Date     (H) 02/25/2021     HEPATIC:   Lab Results   Component Value Date    ALBUMIN 4.1 06/10/2025    PROTTOTAL 6.7 06/10/2025    ALT 19 06/10/2025    AST 27 06/10/2025    ALKPHOS 61 06/10/2025    BILITOTAL 0.5 06/10/2025     OTHER:   Lab Results   Component Value Date    A1C 6.0 (H) 05/01/2025    LINDA 9.3 06/10/2025    MAG 1.9 11/22/2024    TSH 0.83 06/10/2025    T4 1.36 06/10/2025    T3 114 01/18/2023    CRP <2.9 11/16/2021    SED 8 06/10/2025       Anesthesia Plan    ASA Status:  3    NPO Status:  NPO Appropriate    Anesthesia Type: General Mask Only.   Induction: Intravenous.           Consents    Anesthesia Plan(s) and associated risks, benefits, and realistic alternatives discussed. Questions answered and patient/representative(s) expressed understanding.     - Discussed:     - Discussed with:  Patient           - Extended Intubation/Ventilatory Support Discussed: No.     - Pt is DNR/DNI Status: no DNR       Blood Consent:         - Use of Blood Products Discussed: No      Postoperative Care    Pain management: Oral pain medications.   PONV prophylaxis: Ondansetron (or other 5HT-3)     Comments:                 April Luciano MD    I have reviewed the pertinent notes and labs in the chart from the past 30 days and (re)examined the patient.  Any updates or changes from those notes are reflected in this note.    Clinically Significant Risk Factors Present on Admission                             # Overweight: Estimated body mass index is 28.25 kg/m  as calculated from the  "following:    Height as of 5/13/25: 1.676 m (5' 6\").    Weight as of 5/13/25: 79.4 kg (175 lb).                Physical Exam  Airway  Mallampati: II  TM distance: > 3 FB  Neck ROM: full    Cardiovascular - normal exam Comments: pAfib & pHTN per chart  Dental   (+) Modest Abnormalities - crowns, retainers, 1 or 2 missing teeth      Pulmonary - normal exam      Neurological   Other Findings     Anesthesia Plan    ASA Status:  3      NPO Status: NPO Appropriate   Anesthesia Type: General Mask Only.        Consents    Anesthesia Plan(s) and associated risks, benefits, and realistic alternatives discussed. Questions answered and patient/representative(s) expressed understanding.     - Discussed:     - Discussed with:  Patient        - Pt is DNR/DNI Status: no DNR          Postoperative Care         Comments:             "

## 2025-06-11 NOTE — TELEPHONE ENCOUNTER
Received call from patient requesting refill(s) of Oxycodone 5 mg tab     Last dispensed from pharmacy on: May. 28, 2025     Patient's last office/virtual visit by prescribing provider on: June. 9, 2025   Next office/virtual appointment scheduled for: Oct. 10, 2025       UDT: June. 9, 2025   CSA: June. 11, 2025         E-prescribe to      [address] [place] [address],      [facility] route to [facility] for review and preparation of prescription(s).

## 2025-06-11 NOTE — TELEPHONE ENCOUNTER
Refills have been requested for the following medications:         oxyCODONE (ROXICODONE) 5 MG tablet [AXEL WIGGINS]      Patient Comment: Please fill at [facility].  Thanks Winnie      [facility] pharmacy: [place] [address] [place] [address] [address] [place] [address]

## 2025-06-11 NOTE — DISCHARGE INSTRUCTIONS
ECT Discharge Instructions      During your ECT series:    Do not drive or work heavy equipment until 7 days after your last treatment.  Do not drink alcohol or use street drugs (illicit drugs) while you are having treatments.  Do not make important decisions, including legal decisions.    After each treatment:    Get plenty of rest. A responsible adult must stay with your for at least 6 hours.  Avoid heavy or risky activities for 24 hours while in maintenance ECT.   Do not drive for at least 24 hours after your treatment while in maintenance ECT.   If you have more than one treatment within one week, do not drive for 7 days after your last treatment.   If you feel light-headed, sit for a few minutes before standing. Have someone help you get up.  If you have nausea (feel sick to your stomach): Drink only clear liquids such as apple juice, ginger ale, broth or 7UP, Be sure to drink plenty of liquids. Move to a normal diet as you feel able.   If you received Toradol, wait 6 hours before taking ibuprofen.  Call your doctor if:   You have a fever over 100F (37.7 C) (taken under the tongue), or a fever that last more than 24 hours.  Your IV site is red, swollen, very painful or is getting more tender.  You have nausea that gets very bad or does not improve.    If you have any symptoms after ECT, tell our staff before your next treatment.  The ECT Department can be reached at [phone].  The ECT Department is open Mondays, Wednesdays and Fridays from 7:00 AM to 2:00 PM.    To speak to a doctor, call:  Your primary care provider or [facility] for Dr. Riley, Dr. Beavers, Dr. Hutchison or Dr. Cotter PHONE: [phone]; fax: [phone]    New instructions:  Plan:   - Plan for maintenance in 3 weeks                -- Work toward spacing to 4 weeks over time due to memory side-effects (albeit mild)  - Monitor depression severity with clinical assessment augmented with PHQ9 every other treatment  - Consider TRD  group  - Continue current medications     Discharge instructions:   -- Remember to NOT take gabapentin after 6pm the night before each treatment  -- During maintenance ECT, you can't drive for 24 hours after each treatment.  - Call [facility] to learn more about the ongoing VNS study. (Eligibility would require a more severe current symptoms): Phone: [phone]    Your next ECT appointment will now be scheduled by a scheduling service. They should call you within 24 hours of your ECT appointment. For any urgent scheduling needs or to change your appointment, please call the ECT department at [phone] and they will get in touch with scheduling for you.     You have received Toradol today at 0121pm. Toradol is an NSAID.  Do not take any NSAID's including: Ibuprofen, Naproxen Sodium, Asprin, Advil, Motrin, Aleve, Shannan, etc., until six hours after the above time which would be 0721pm. If you have any questions check back with your Physician, or pharmacist.     Covid-19 Testing:  We are no longer requiring covid tests prior to your procedure. If you are ill, please call the ECT department to discuss plan for rescheduling your appointment. [phone]    NEW: Please come to the [facility] entrance at [address] [place] [address] and check in at Room NB14. You will receive your wristband and stickers there and can then come down to the ECT department in the basement of the [facility].       After your appointment, you may be picked up at the [facility] entrance, which is located at [address] [place] [address].  [id number], about 1 block North of the [facility] entrance.    Transported by:   Sidney/    Reviewed AVS discharge instructions with:   S[facility]ney/
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
## 2025-06-12 ENCOUNTER — PATIENT OUTREACH (OUTPATIENT)
Dept: CARE COORDINATION | Facility: CLINIC | Age: 70
End: 2025-06-12
Payer: MEDICARE

## 2025-06-12 NOTE — PROGRESS NOTES
Clinic Care Coordination Contact  [facility]/Voicemail    Clinical Data: Care Coordinator Outreach    Outreach Documentation Number of Outreach Attempt   6/12/2025  10:55 AM 1       Left message on patient's voicemail with call back information and requested return call.      Plan: Care Coordinator will try to reach patient again in 10 business days.    David Myhre, RN   [facility] RN  [phone]

## 2025-06-16 ENCOUNTER — VIRTUAL VISIT (OUTPATIENT)
Dept: PSYCHOLOGY | Facility: CLINIC | Age: 70
End: 2025-06-16
Payer: MEDICARE

## 2025-06-16 DIAGNOSIS — F33.2 MAJOR DEPRESSIVE DISORDER, RECURRENT SEVERE WITHOUT PSYCHOTIC FEATURES (H): Primary | ICD-10-CM

## 2025-06-16 DIAGNOSIS — F41.1 GENERALIZED ANXIETY DISORDER: ICD-10-CM

## 2025-06-16 PROCEDURE — 90834 PSYTX W PT 45 MINUTES: CPT | Mod: 95 | Performed by: MARRIAGE & FAMILY THERAPIST

## 2025-06-16 NOTE — PROGRESS NOTES
[facility]                                     Progress Note    Patient Name: Randi Cleary  Date: 2025             Service Type: Individual      Session Start Time:  8:30  Session End Time: 9:20     Session Length:  38-52    Session #: 32    Attendees: Client    Service Modality:  Video Visit:      Provider verified identity through the following two step process.  Patient provided:  Patient  and Patient is known previously to provider    Telemedicine Visit: The patient's condition can be safely assessed and treated via synchronous audio and visual telemedicine encounter.      Reason for Telemedicine Visit: Patient has requested telehealth visit    Originating Site (Patient Location): Patient's home    Distant Site (Provider Location): [facility] Outpatient Setting: [place]    Consent:  The patient/guardian has verbally consented to: the potential risks and benefits of telemedicine (video visit) versus in person care; bill my insurance or make self-payment for services provided; and responsibility for payment of non-covered services.     Patient would like the video invitation sent by:  My Chart    Mode of Communication:  Video Conference via [facility]    Distant Location (Provider):  On-site    As the provider I attest to compliance with applicable laws and regulations related to telemedicine.        DATA  Interactive Complexity: No  Crisis: No        Progress Since Last Session (Related to Symptoms / Goals / Homework):   Symptoms: No change      Homework: n/a      Episode of Care Goals: Satisfactory progress - ACTION (Actively working towards change); Intervened by reinforcing change plan / affirming steps taken     Current / Ongoing Stressors and Concerns:   Discussed therapist's resignation from [facility] and plans for transfer.  Reflected on her transition over many years of different providers who leave positions.  Reflecting on her experience of developmental  "trauma and how this can be stored in the body.  When she identified a desire to write a book, she indicates feeling \"freed\" and this is activated in her chest area.       Treatment Objective(s) Addressed in This Session:   Distress tolerance     Intervention:   Validation, safety assessment.  Encouragement.  Small steps toward larger goals. Discussion of generational traumas and psychoeducation about trauma.      Assessments completed prior to visit:  The following assessments were completed by patient for this visit:  PHQ9:       3/3/2025    10:28 AM 3/5/2025    11:44 AM 3/11/2025     8:36 PM 4/18/2025    10:11 AM 4/21/2025    10:36 AM 5/13/2025     7:54 PM 6/16/2025     8:24 AM   PHQ-9 SCORE   PHQ-9 Total Score MyChart 15 (Moderately severe depression) 15 (Moderately severe depression) 14 (Moderate depression) 16 (Moderately severe depression) 16 (Moderately severe depression) 13 (Moderate depression) 8 (Mild depression)   PHQ-9 Total Score 15  15  14  16  16  13  8        Patient-reported     GAD7:       12/3/2024     9:10 AM 12/17/2024     9:13 AM 1/23/2025     8:47 AM 2/10/2025    10:22 PM 3/3/2025    10:45 AM 4/9/2025     8:50 AM 5/13/2025     7:56 PM   CHAVO-7 SCORE   Total Score 17 (severe anxiety) 13 (moderate anxiety) 11 (moderate anxiety) 14 (moderate anxiety) 16 (severe anxiety) 12 (moderate anxiety) 13 (moderate anxiety)   Total Score 17  13  11  14  16  12  13        Patient-reported     CAGE-AID:       6/22/2020     7:12 PM 1/18/2022    11:15 PM 6/6/2024     8:00 AM   CAGE-AID Total Score   Total Score 4 4 4   Total Score MyChart 4 (A total score of 2 or greater is considered clinically significant) 4 (A total score of 2 or greater is considered clinically significant)      PROMIS 10-Global Health (all questions and answers displayed):       11/26/2024     5:00 PM 12/4/2024     9:28 AM 12/18/2024     9:10 AM 12/30/2024     5:44 PM 4/1/2025    11:00 AM 4/9/2025     9:00 AM 4/21/2025    10:39 AM   BLAYNE " 10   In general, would you say your health is:  Good Fair Fair  Fair Fair   In general, would you say your quality of life is:  Poor Fair Poor  Poor Poor   In general, how would you rate your physical health?  Good Fair Fair  Fair Poor   In general, how would you rate your mental health, including your mood and your ability to think?  Fair Fair Poor  Fair Fair   In general, how would you rate your satisfaction with your social activities and relationships?  Poor Poor Poor  Poor Poor   In general, please rate how well you carry out your usual social activities and roles  Poor Poor Poor  Poor Poor   To what extent are you able to carry out your everyday physical activities such as walking, climbing stairs, carrying groceries, or moving a chair?  Moderately Moderately Moderately  Moderately Moderately   In the past 7 days, how often have you been bothered by emotional problems such as feeling anxious, depressed, or irritable?  Often Often Often  Often Often   In the past 7 days, how would you rate your fatigue on average?  Moderate Moderate Moderate  Moderate Moderate   In the past 7 days, how would you rate your pain on average, where 0 means no pain, and 10 means worst imaginable pain?  8 7 8  8 8   In general, would you say your health is:  3 2 2 3 2 2   In general, would you say your quality of life is:  1 2 1 1 1 1   In general, how would you rate your physical health?  3 2 2 2 2 1   In general, how would you rate your mental health, including your mood and your ability to think?  2 2 1 2 2 2   In general, how would you rate your satisfaction with your social activities and relationships?  1 1 1 2 1 1   In general, please rate how well you carry out your usual social activities and roles. (This includes activities at home, at work and in your community, and responsibilities as a parent, child, spouse, employee, friend, etc.)  1 1 1 1 1 1   To what extent are you able to carry out your everyday physical activities  such as walking, climbing stairs, carrying groceries, or moving a chair?  3 3 3 3 3 3   In the past 7 days, how often have you been bothered by emotional problems such as feeling anxious, depressed, or irritable?  4 4 4 4 4 4   In the past 7 days, how would you rate your fatigue on average?  3 3 3 3 3 3   In the past 7 days, how would you rate your pain on average, where 0 means no pain, and 10 means worst imaginable pain?  8 7 8 8 8 8   Global Mental Health Score  6  7  5  7  6  6    Global Physical Health Score  11  10  10  10  10  9    PROMIS TOTAL - SUBSCORES  17  17  15  17  16  15        Information is confidential and restricted. Go to Review Flowsheets to unlock data.    Patient-reported     [place] Suicide Severity Rating Scale (Lifetime/Recent)      1/9/2023     1:00 PM 5/21/2024     4:49 PM 5/21/2024     9:00 PM 6/6/2024     8:00 AM 7/10/2024    12:00 PM 11/22/2024    11:05 AM 2/26/2025     5:59 PM   [place] Suicide Severity Rating (Lifetime/Recent)   Q1 Wish to be Dead (Lifetime)   Yes       Comments   OD on medications       Q2 Non-Specific Active Suicidal Thoughts (Lifetime)   No       Most Severe Ideation Rating (Lifetime)   5       Most Severe Ideation Description (Lifetime)   OD on medication       Frequency (Lifetime)   3       Duration (Lifetime)   3       Controllability (Lifetime)   2       Protective Factors  (Lifetime)   4       Reasons for Ideation (Lifetime)   5       Q1 Wished to be Dead (Past Month) yes  1-->yes 1-->yes 1-->yes  0-->no 0-->no   Q2 Suicidal Thoughts (Past Month) no  1-->yes 1-->yes 1-->yes  0-->no 0-->no   Q3 Suicidal Thought Method no  0-->no 0-->no 1-->yes      Q4 Suicidal Intent without Specific Plan no  1-->yes 0-->no 0-->no      Q5 Suicide Intent with Specific Plan no  0-->no 0-->no 1-->yes      Q6 Suicide Behavior (Lifetime) yes  1-->yes 0-->no 1-->yes  0-->no 0-->no   If yes to Q6, within past 3 months? no  1-->yes  0-->no  0-->no       Level of Risk per Screen  moderate risk  high risk moderate risk high risk  no risks indicated no risks indicated   1. Wish to be Dead (Lifetime)     N     2. Non-Specific Active Suicidal Thoughts (Lifetime)     N         Data saved with a previous flowsheet row definition         ASSESSMENT: Current Emotional / Mental Status (status of significant symptoms):   Risk status (Self / Other harm or suicidal ideation)   Patient denies current fears or concerns for personal safety.   Patient denies current or recent suicidal ideation or behaviors.   Patient denies current or recent homicidal ideation or behaviors.   Patient denies current or recent self injurious behavior or ideation.   Patient denies other safety concerns.   Patient reports there has been no change in risk factors since their last session.     Patient reports there has been no change in protective factors since their last session.     A safety and risk management plan has been developed including: Patient consented to co-developed safety plan on 6/4/24.  Safety and risk management plan was reviewed.   Patient agreed to use safety plan should any safety concerns arise.  A copy was made available to the patient.     Appearance:   Appropriate    Eye Contact:   Good    Psychomotor Behavior: Normal    Attitude:   Cooperative  Friendly   Orientation:   All   Speech    Rate / Production: Talkative    Volume:  Normal    Mood:    Anxious  Sad    Affect:    Subdued  Tearful   Thought Content:  Clear    Thought Form:  Goal Directed    Insight:    Good      Medication Review:   No changes to current psychiatric medication(s)     Medication Compliance:   Yes     Changes in Health Issues:   None reported     Chemical Use Review:   Substance Use: Chemical use reviewed, no active concerns identified      Tobacco Use: No current tobacco use.      Diagnosis:  1. 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 Generalized anxiety disorder          Collateral Reports  "Completed:   Not Applicable    PLAN: (Patient Tasks / Therapist Tasks / Other)  Continue following safety plan.  Awareness of sensation of feeling \"freed.\"  Prepare for transition to care of Mimi Orantes as this writer ends services on 6/30.     Erika Colorado, LMFT                                                         ______________________________________________________________________    Individual Treatment Plan    Patient's Name: Randi Cleary  YOB: 1955    Date of Creation: 7/17/2024  Date Treatment Plan Last Reviewed/Revised: 7/17/2024, 10/30/24, 2/3/2025, 5/7/2025     DSM5 Diagnoses:   296.33 (F33.2) Major Depressive Disorder, Recurrent Episode, Severe   300.02 (F41.1) Generalized Anxiety Disorder  Psychosocial / Contextual Factors: history of trauma  PROMIS (reviewed every 90 days):     Referral / Collaboration:  Discussed and patient will pursue a therapy group for depressive symptoms.    Anticipated number of session for this episode of care: 9-12 sessions  Anticipation frequency of session: Weekly  Anticipated Duration of each session: 38-52 minutes  Treatment plan will be reviewed in 90 days or when goals have been changed.       MeasurableTreatment Goal(s) related to diagnosis / functional impairment(s)  Goal 1: Client will keep self safe and eliminate suicidal ideation and self-harm behaviors.    Objective #A (Client Action)    Client will make a list of at least 10 skills or activities that you will to use to distract from urges to harm self.  Status: Completed - Date: 10/30/24      Intervention(s)  Therapist will provide ideas and strategies for generating coping skills list.    Objective #B  Client will make a list of pros and cons for tolerating and not tolerating an urge to harm self.  Status: Continued - Date(s): 5/7/2025     Intervention(s)  Therapist will guide client through DBT-style Pros/Cons list for behavior change.    Objective #C  Client will identify and " practice the new strategies for dealing with strong emotions, learn and practice relaxation breathing.  Status: Continued - Date(s): 6/16/25      Intervention(s)  Therapist will teach distraction skills. Practice them within session and outside of session.    Goal 2: Client will report reduction in anxiety also as evidenced by reduction of CHAVO-7 score below 6 points within the next 12 weeks.    Objective #A (Client Action)    Client will identify at least three triggers for anxiety.  Status: Completed - Date: 10/30/24      Intervention(s)  Therapist will provide educational materials on common triggers and signs of anxiety.    Objective #B  Client will use relaxation strategies at least two times per day to reduce the physical symptoms of anxiety.  Status: Continued - Date(s): 5/7/2025     Intervention(s)  Therapist will teach relaxation strategies such as mindfulness, deep breathing, muscle relaxation, and sensory activities.    Objective #C  Client will use cognitive strategies identified in therapy to challenge anxious thoughts.  Status: Continued - Date(s): 5/7/2025     Intervention(s)  Therapist will teach strategies for cognitive modification using REBT model.    Goal 3: Client will report improved mood as indicated by PHQ-9 score below 6 for consistent 8 weeks.    Objective #A (Client Action)    Status: Continued - Date: 5/7/2025     Client will Increase interest, engagement, and pleasure in doing things.    Intervention(s)  Therapist will assign homework for daily involvement in pleasant activities.    Objective #B  Client will Identify negative self-talk and behaviors: challenge core beliefs, myths, and actions.    Status: Continued - Date(s): 5/7/2025     Intervention(s)  Therapist will teach strategies for cognitive modification using REBT models.    Objective #C  Client will Improve quantity and quality of night time sleep / decrease daytime naps.  Status: Continued - Date(s): 5/7/2025      Intervention(s)  Therapist will teach sleep hygiene strategies.  Assign homework for daily practice.    Goal 4: Client will report reduced or eliminated physiological activation when recalling a distressing event including decreased re-experiencing, decreased avoidance, and decreased hyperarousal.    Objective #A (Client Action)    Status: Continued: 5/7/25      Client will acquire knowledge of trauma, common symptoms post-trauma, emotion regulation strategies, stress management strategies, and cognitive coping strategies.    Intervention(s)  Therapist will teach about effects of trauma on mind and body; encourage emotion identification and expression; practice with client the stress management strategies; and teach cognitive modification.    Objective #B  Client will use Brainspotting while focusing on physiological sensations related to distressing events.  Client will assess and rate level of physiological activation.  Status: Continued - Date(s): 5/7/2025     Intervention(s)  Therapist will provide use a pointer or other materials to assist client in holding a brainspot in their visual field.  Therapist might also provide biolateral music through headphones to deepen the experience.         Patient has reviewed and agreed to the above plan.      EWELINA Billingsley  July 17, 2024

## 2025-06-22 ENCOUNTER — MYC MEDICAL ADVICE (OUTPATIENT)
Dept: PSYCHIATRY | Facility: CLINIC | Age: 70
End: 2025-06-22
Payer: MEDICARE

## 2025-06-22 DIAGNOSIS — F41.1 GENERALIZED ANXIETY DISORDER: Primary | ICD-10-CM

## 2025-06-23 ENCOUNTER — TELEPHONE (OUTPATIENT)
Dept: PSYCHOLOGY | Facility: CLINIC | Age: 70
End: 2025-06-23
Payer: MEDICARE

## 2025-06-23 RX ORDER — GABAPENTIN 100 MG/1
100-200 CAPSULE ORAL 3 TIMES DAILY
Qty: 180 CAPSULE | Refills: 0 | Status: SHIPPED | OUTPATIENT
Start: 2025-06-23
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
## 2025-06-24 ENCOUNTER — OFFICE VISIT (OUTPATIENT)
Dept: PSYCHIATRY | Facility: CLINIC | Age: 70
End: 2025-06-24
Payer: MEDICARE

## 2025-06-24 DIAGNOSIS — F33.2 SEVERE EPISODE OF RECURRENT MAJOR DEPRESSIVE DISORDER, WITHOUT PSYCHOTIC FEATURES (H): Primary | ICD-10-CM

## 2025-06-24 NOTE — TELEPHONE ENCOUNTER
PA Closed with the note: [employer] is not able to process this request through ePA, please contact the plan at [phone] or fax in request to [phone].     Writer called Shannan to confirm patient's insurance information for prior authorization:    [employer]    RxBIN: [id number]  PCN: [id number]  ID: [id number]  Group: [id number]    Writer attempted submit prior authorization via CoverMyMeds and received the following notification:

## 2025-06-26 ENCOUNTER — VIRTUAL VISIT (OUTPATIENT)
Dept: PHARMACY | Facility: CLINIC | Age: 70
End: 2025-06-26
Attending: NURSE PRACTITIONER
Payer: COMMERCIAL

## 2025-06-26 DIAGNOSIS — F33.0 MILD EPISODE OF RECURRENT MAJOR DEPRESSIVE DISORDER: ICD-10-CM

## 2025-06-26 DIAGNOSIS — E03.9 HYPOTHYROIDISM, UNSPECIFIED TYPE: ICD-10-CM

## 2025-06-26 DIAGNOSIS — M10.9 URIC ACID ARTHROPATHY: ICD-10-CM

## 2025-06-26 DIAGNOSIS — Z78.9 TAKES DIETARY SUPPLEMENTS: ICD-10-CM

## 2025-06-26 DIAGNOSIS — J44.9 CHRONIC OBSTRUCTIVE PULMONARY DISEASE, UNSPECIFIED COPD TYPE (H): ICD-10-CM

## 2025-06-26 DIAGNOSIS — G47.00 INSOMNIA, UNSPECIFIED TYPE: ICD-10-CM

## 2025-06-26 DIAGNOSIS — G25.81 RLS (RESTLESS LEGS SYNDROME): ICD-10-CM

## 2025-06-26 DIAGNOSIS — L21.9 SEBORRHEIC DERMATITIS: ICD-10-CM

## 2025-06-26 DIAGNOSIS — K21.9 GASTROESOPHAGEAL REFLUX DISEASE WITHOUT ESOPHAGITIS: Primary | ICD-10-CM

## 2025-06-26 DIAGNOSIS — F41.1 GAD (GENERALIZED ANXIETY DISORDER): ICD-10-CM

## 2025-06-26 DIAGNOSIS — R52 PAIN: ICD-10-CM

## 2025-06-26 RX ORDER — ALBUTEROL SULFATE 90 UG/1
2 INHALANT RESPIRATORY (INHALATION) EVERY 6 HOURS PRN
Qty: 18 G | Refills: 11 | Status: SHIPPED | OUTPATIENT
Start: 2025-06-26

## 2025-06-26 NOTE — PATIENT INSTRUCTIONS
"Recommendations from today's MTM visit:                                                       Take Advair inhaler every 12 hours  I will ask Dr. Dubois about resending the colchicine for a longer duration  Refilled your rescue inhaler to replace your  one  Stop vitamin C    Follow-up: as needed     It was great speaking with you today.  I value your experience and would be very thankful for your time in providing feedback in our clinic survey. In the next few days, you may receive an email or text message from [employer] with a link to a survey related to your  clinical pharmacist.\"     To schedule another MTM appointment, please call the clinic directly or you may call the MTM scheduling line at [phone].    My Clinical Pharmacist's contact information:                                                      Please feel free to contact me with any questions or concerns you have.      Carmen Huff, PharmD  Medication Therapy Management (MTM) Pharmacist   "

## 2025-06-26 NOTE — PROGRESS NOTES
Medication Therapy Management (MTM) Encounter    ASSESSMENT:                            Medication Adherence/Access: No issues identified.    COPD   Stable. Recommended she use Advair twice daily as directed. Refilled albuterol since prescription is .      GERD    Stable.      Insomnia   Improved with Cortisol Manager supplement.      Anxiety and Depression  Follows with psychiatry.      Pain/Restless legs syndrome    Follows with pain clinic.      Hypothyroidism   Following with endocrinology. Cortisol Manager could affect thyroid supplement as well.      Supplements   Recommend she stop vitamin C due to lack of indication and since it contains additional potassium which she does not need. Reasonable to try turmeric for joint inflammation if she is interested.      Gout   Uric acid normal. Will discuss longer duration of colchicine for her suspected gout flare with pain provider.     Seborrheic dermatitis  Follows with dermatology.     PLAN:                            Take Advair inhaler every 12 hours  I will ask Dr. Dubois about resending the colchicine for a longer duration  Refilled your rescue inhaler to replace your  one  Stop vitamin C    Follow-up: as needed     SUBJECTIVE/OBJECTIVE:                          Winnie Cleary is a 69 year old female seen for an initial visit. She was referred to me from Kaushik Hobbs CNP.      Reason for visit: cost of Breo.    Allergies/ADRs: Reviewed in chart  Past Medical History: Reviewed in chart  Tobacco: She reports that she quit smoking about 25 years ago. Her smoking use included cigarettes. She started smoking about 54 years ago. She has a 72.9 pack-year smoking history. She has been exposed to tobacco smoke. She has never used smokeless tobacco.  Alcohol: none    Medication Adherence/Access: Will be going through [email or web address] from now on.     COPD   Advair 250-50mcg every 12 hours - switched from Breo due to cost. Reports this has been working well but  has only been taking once daily.   Albuterol inhaler as needed - hardly ever uses  Benzonatate 100mg three times daily as needed - uses once in a while  Mucinex ER 600mg every morning - hard time finding the generic version    Patient rinses their mouth after using steroid inhaler.      Symptoms include cough.   Previously on Trelegy but stopped once she was in coverage gap last year?     GERD    Omeprazole 20 mg as needed   Patient feels that current regimen is effective.     Insomnia   melatonin 1 mg at bedtime   Lavender (Calm Aid) 160mg at bedtime   Cortisol Manager at bedtime   Just started this a couple weeks ago and has helped her sleep significantly. She feels she is able to fall asleep faster and stay asleep - only gets up twice a night now. Has been able to lower dose of her lavender and gabapentin.     Exacerbated by incontinence. She does have a Temperpedic bed which tracks her sleep.   She has upcoming sleep study to reevaluate her sleep apnea after weight loss, since she is not using CPAP anymore.     Mental Health   Anxiety and Depression  Gabapentin 100-200mg three times daily  She generally takes 2 capsules in the afternoon and 1-3 at bedtime. Some days only needs 4 capsules total.   Feels it does contribute to daytime drowsiness but is helpful for anxiety  Medical cannabis once daily     She sees a depression group and receives ECT.   Patient is seeing a therapist.   Patient is seeing a psychiatrist.     Pain /Restless legs syndrome   Oxycodone 5mg fives times daily - denies constipation  Medical cannabis for sleep/anxiety  Ropinirole 2mg three times daily - this is effective for restless legs syndrome   Acetaminophen 325mg - patient reports taking 2-3 times a week   Narcan as needed for overdose    Follows with pain clinic   Pain type/location: spinal pain     Hypothyroidism   Synthroid 150mcg daily except 75mg on Sat and Sun - working with endo to adjust her dose  No issues reported today.   She  reported having had lots of variation in symptoms when taking generics, which have been much more consistent with brand name.      Supplements   B12 500mcg - 2-3 sprays sublingual   Super B Complex once daily with vitamin C - helps with mood  D3 95642 units once weekly  Vitamin C 700mg once daily - reports this has potassium in it as well  Magnesium twice daily   Cetyl-Myrisfoleate - not sure this has been helpful, hasn't taken consistently last couple months  Fatty acid 15 for joints and feels this is helpful  Patient is interested in stopping/changing supplements.   Interested in turmeric.      Gout   Colchicine 0.6mg - as needed for flares   She finished a course of colchicine which helped but symptoms have returned.   Ethacrynic acid 25mg once daily - helps with swelling in fingers, uses 1/2 tab once a week on average   Klor-con 20mEq every morning - takes it with diuretic    She reports her workup was negative for rheumatoid arthritis.   Joints in hands continue to be very painful and seem affected by food/weather. Symptoms also include locking/stiff joints.   Allopurinol was not effective.   Denies side effects    Seborrheic dermatitis  Ketoconazole 2% shampoo as needed - does not feel this works better than OTC  Sees a dermatology clinic.     Today's Vitals: LMP  (LMP Unknown)   ----------------    I spent 51 minutes with this patient today. All changes were made via collaborative practice agreement with Kaushik Hobbs CNP.     A summary of these recommendations was sent via [employer].    Carmen Huff, GiD  Medication Therapy Management (MTM) Pharmacist    Telemedicine Visit Details  The patient's medications can be safely assessed via a telemedicine encounter.  Type of service:  Video Conference via [employer]  Originating Location (pt. Location): Home    Distant Location (provider location):  On-site  Start Time: 3:01 PM  End Time: 3:52 PM     Medication Therapy Recommendations  COPD (chronic obstructive  pulmonary disease) (H)   1 Current Medication: fluticasone-salmeterol (ADVAIR) 250-50 MCG/ACT inhaler   Current Medication Sig: Inhale 1 puff into the lungs every 12 hours.   Rationale: Does not understand instructions - Adherence - Adherence   Recommendation: Provide Education   Status: Patient Agreed - Adherence/Education   Identified Date: 6/26/2025 Completed Date: 6/26/2025         Takes dietary supplements   1 Current Medication: vitamin C (ASCORBIC ACID) 1000 MG TABS (Discontinued)   Current Medication Sig: Take 1,000 mg by mouth every morning.   Rationale: No medical indication at this time - Unnecessary medication therapy - Indication   Recommendation: Discontinue Medication   Status: Accepted - no CPA Needed   Identified Date: 6/26/2025 Completed Date: 6/26/2025         Uric acid arthropathy   1 Current Medication: colchicine (COLCRYS) 0.6 MG tablet   Current Medication Sig: Two tablets now, and one tablet after six hours   Rationale: Order duration inappropriate - Dosage too low - Effectiveness   Recommendation: Increase Ordered Duration of Medication   Status: Contact Provider - Awaiting Response   Identified Date: 6/26/2025

## 2025-06-30 ENCOUNTER — VIRTUAL VISIT (OUTPATIENT)
Dept: PSYCHOLOGY | Facility: CLINIC | Age: 70
End: 2025-06-30
Payer: MEDICARE

## 2025-06-30 ENCOUNTER — MYC MEDICAL ADVICE (OUTPATIENT)
Dept: PSYCHIATRY | Facility: CLINIC | Age: 70
End: 2025-06-30
Payer: MEDICARE

## 2025-06-30 DIAGNOSIS — F33.2 MAJOR DEPRESSIVE DISORDER, RECURRENT SEVERE WITHOUT PSYCHOTIC FEATURES (H): Primary | ICD-10-CM

## 2025-06-30 DIAGNOSIS — F41.1 GENERALIZED ANXIETY DISORDER: ICD-10-CM

## 2025-06-30 PROCEDURE — 90834 PSYTX W PT 45 MINUTES: CPT | Mod: 95 | Performed by: MARRIAGE & FAMILY THERAPIST

## 2025-06-30 ASSESSMENT — ANXIETY QUESTIONNAIRES
8. IF YOU CHECKED OFF ANY PROBLEMS, HOW DIFFICULT HAVE THESE MADE IT FOR YOU TO DO YOUR WORK, TAKE CARE OF THINGS AT HOME, OR GET ALONG WITH OTHER PEOPLE?: SOMEWHAT DIFFICULT
7. FEELING AFRAID AS IF SOMETHING AWFUL MIGHT HAPPEN: SEVERAL DAYS
7. FEELING AFRAID AS IF SOMETHING AWFUL MIGHT HAPPEN: SEVERAL DAYS
GAD7 TOTAL SCORE: 10
GAD7 TOTAL SCORE: 10
5. BEING SO RESTLESS THAT IT IS HARD TO SIT STILL: SEVERAL DAYS
6. BECOMING EASILY ANNOYED OR IRRITABLE: MORE THAN HALF THE DAYS
4. TROUBLE RELAXING: MORE THAN HALF THE DAYS
GAD7 TOTAL SCORE: 10
6. BECOMING EASILY ANNOYED OR IRRITABLE: MORE THAN HALF THE DAYS
IF YOU CHECKED OFF ANY PROBLEMS ON THIS QUESTIONNAIRE, HOW DIFFICULT HAVE THESE PROBLEMS MADE IT FOR YOU TO DO YOUR WORK, TAKE CARE OF THINGS AT HOME, OR GET ALONG WITH OTHER PEOPLE: SOMEWHAT DIFFICULT
IF YOU CHECKED OFF ANY PROBLEMS ON THIS QUESTIONNAIRE, HOW DIFFICULT HAVE THESE PROBLEMS MADE IT FOR YOU TO DO YOUR WORK, TAKE CARE OF THINGS AT HOME, OR GET ALONG WITH OTHER PEOPLE: SOMEWHAT DIFFICULT
GAD7 TOTAL SCORE: 10
5. BEING SO RESTLESS THAT IT IS HARD TO SIT STILL: SEVERAL DAYS
7. FEELING AFRAID AS IF SOMETHING AWFUL MIGHT HAPPEN: SEVERAL DAYS
7. FEELING AFRAID AS IF SOMETHING AWFUL MIGHT HAPPEN: SEVERAL DAYS
1. FEELING NERVOUS, ANXIOUS, OR ON EDGE: MORE THAN HALF THE DAYS
GAD7 TOTAL SCORE: 10
2. NOT BEING ABLE TO STOP OR CONTROL WORRYING: SEVERAL DAYS
8. IF YOU CHECKED OFF ANY PROBLEMS, HOW DIFFICULT HAVE THESE MADE IT FOR YOU TO DO YOUR WORK, TAKE CARE OF THINGS AT HOME, OR GET ALONG WITH OTHER PEOPLE?: SOMEWHAT DIFFICULT
1. FEELING NERVOUS, ANXIOUS, OR ON EDGE: MORE THAN HALF THE DAYS
4. TROUBLE RELAXING: MORE THAN HALF THE DAYS
2. NOT BEING ABLE TO STOP OR CONTROL WORRYING: SEVERAL DAYS
3. WORRYING TOO MUCH ABOUT DIFFERENT THINGS: SEVERAL DAYS
GAD7 TOTAL SCORE: 10
3. WORRYING TOO MUCH ABOUT DIFFERENT THINGS: SEVERAL DAYS

## 2025-06-30 ASSESSMENT — PATIENT HEALTH QUESTIONNAIRE - PHQ9

## 2025-06-30 NOTE — PROGRESS NOTES
[facility]                                     Progress Note    Patient Name: Randi Cleary  Date: 2025              Service Type: Individual      Session Start Time:  9:30  Session End Time: 10:20     Session Length:  38-52    Session #: 33    Attendees: Client    Service Modality:  Video Visit:      Provider verified identity through the following two step process.  Patient provided:  Patient  and Patient is known previously to provider    Telemedicine Visit: The patient's condition can be safely assessed and treated via synchronous audio and visual telemedicine encounter.      Reason for Telemedicine Visit: Patient has requested telehealth visit    Originating Site (Patient Location): Patient's home    Distant Site (Provider Location): Provider Remote Setting- Home Office    Consent:  The patient/guardian has verbally consented to: the potential risks and benefits of telemedicine (video visit) versus in person care; bill my insurance or make self-payment for services provided; and responsibility for payment of non-covered services.     Patient would like the video invitation sent by:  My Chart    Mode of Communication:  Video Conference via [facility]well    Distant Location (Provider):  Off-site    As the provider I attest to compliance with applicable laws and regulations related to telemedicine.    DATA  Interactive Complexity: No  Crisis: No     Progress Since Last Session (Related to Symptoms / Goals / Homework):   Symptoms: Improving      Homework: n/a      Episode of Care Goals: Satisfactory progress - ACTION (Actively working towards change); Intervened by reinforcing change plan / affirming steps taken     Current / Ongoing Stressors and Concerns:   Reflected on therapeutic process over the past year.  Sadness about the end of this therapeutic relationship.  Planning an estate sale on a smaller scale than previous sale to help with de-cluttering.      Treatment Objective(s)  Addressed in This Session:   Distress tolerance     Intervention:   Validation, safety assessment.  Encouragement.  Small steps toward larger goals. Closure to therapeutic relationship.      Assessments completed prior to visit:  The following assessments were completed by patient for this visit:  PHQ9:       3/5/2025    11:44 AM 3/11/2025     8:36 PM 4/18/2025    10:11 AM 4/21/2025    10:36 AM 5/13/2025     7:54 PM 6/16/2025     8:24 AM 6/30/2025     9:04 AM   PHQ-9 SCORE   PHQ-9 Total Score MyChart 15 (Moderately severe depression) 14 (Moderate depression) 16 (Moderately severe depression) 16 (Moderately severe depression) 13 (Moderate depression) 8 (Mild depression) 15 (Moderately severe depression)   PHQ-9 Total Score 15  14  16  16  13  8  15        Patient-reported     GAD7:       12/17/2024     9:13 AM 1/23/2025     8:47 AM 2/10/2025    10:22 PM 3/3/2025    10:45 AM 4/9/2025     8:50 AM 5/13/2025     7:56 PM 6/30/2025     9:06 AM   CHAVO-7 SCORE   Total Score 13 (moderate anxiety) 11 (moderate anxiety) 14 (moderate anxiety) 16 (severe anxiety) 12 (moderate anxiety) 13 (moderate anxiety) 10 (moderate anxiety)   Total Score 13  11  14  16  12  13  10        Patient-reported     CAGE-AID:       6/22/2020     7:12 PM 1/18/2022    11:15 PM 6/6/2024     8:00 AM   CAGE-AID Total Score   Total Score 4 4 4   Total Score MyChart 4 (A total score of 2 or greater is considered clinically significant) 4 (A total score of 2 or greater is considered clinically significant)      PROMIS 10-Global Health (all questions and answers displayed):       11/26/2024     5:00 PM 12/4/2024     9:28 AM 12/18/2024     9:10 AM 12/30/2024     5:44 PM 4/1/2025    11:00 AM 4/9/2025     9:00 AM 4/21/2025    10:39 AM   PROMIS 10   In general, would you say your health is:  Good Fair Fair  Fair Fair   In general, would you say your quality of life is:  Poor Fair Poor  Poor Poor   In general, how would you rate your physical health?  Good Fair  Fair  Fair Poor   In general, how would you rate your mental health, including your mood and your ability to think?  Fair Fair Poor  Fair Fair   In general, how would you rate your satisfaction with your social activities and relationships?  Poor Poor Poor  Poor Poor   In general, please rate how well you carry out your usual social activities and roles  Poor Poor Poor  Poor Poor   To what extent are you able to carry out your everyday physical activities such as walking, climbing stairs, carrying groceries, or moving a chair?  Moderately Moderately Moderately  Moderately Moderately   In the past 7 days, how often have you been bothered by emotional problems such as feeling anxious, depressed, or irritable?  Often Often Often  Often Often   In the past 7 days, how would you rate your fatigue on average?  Moderate Moderate Moderate  Moderate Moderate   In the past 7 days, how would you rate your pain on average, where 0 means no pain, and 10 means worst imaginable pain?  8 7 8  8 8   In general, would you say your health is:  3 2 2 3 2 2   In general, would you say your quality of life is:  1 2 1 1 1 1   In general, how would you rate your physical health?  3 2 2 2 2 1   In general, how would you rate your mental health, including your mood and your ability to think?  2 2 1 2 2 2   In general, how would you rate your satisfaction with your social activities and relationships?  1 1 1 2 1 1   In general, please rate how well you carry out your usual social activities and roles. (This includes activities at home, at work and in your community, and responsibilities as a parent, child, spouse, employee, friend, etc.)  1 1 1 1 1 1   To what extent are you able to carry out your everyday physical activities such as walking, climbing stairs, carrying groceries, or moving a chair?  3 3 3 3 3 3   In the past 7 days, how often have you been bothered by emotional problems such as feeling anxious, depressed, or irritable?  4 4 4  4 4 4   In the past 7 days, how would you rate your fatigue on average?  3 3 3 3 3 3   In the past 7 days, how would you rate your pain on average, where 0 means no pain, and 10 means worst imaginable pain?  8 7 8 8 8 8   Global Mental Health Score  6  7  5  7  6  6    Global Physical Health Score  11  10  10  10  10  9    PROMIS TOTAL - SUBSCORES  17  17  15  17  16  15        Information is confidential and restricted. Go to Review Flowsheets to unlock data.    Patient-reported     [place] Suicide Severity Rating Scale (Lifetime/Recent)      1/9/2023     1:00 PM 5/21/2024     4:49 PM 5/21/2024     9:00 PM 6/6/2024     8:00 AM 7/10/2024    12:00 PM 11/22/2024    11:05 AM 2/26/2025     5:59 PM   [place] Suicide Severity Rating (Lifetime/Recent)   Q1 Wish to be Dead (Lifetime)   Yes       Comments   OD on medications       Q2 Non-Specific Active Suicidal Thoughts (Lifetime)   No       Most Severe Ideation Rating (Lifetime)   5       Most Severe Ideation Description (Lifetime)   OD on medication       Frequency (Lifetime)   3       Duration (Lifetime)   3       Controllability (Lifetime)   2       Protective Factors  (Lifetime)   4       Reasons for Ideation (Lifetime)   5       Q1 Wished to be Dead (Past Month) yes  1-->yes 1-->yes 1-->yes  0-->no 0-->no   Q2 Suicidal Thoughts (Past Month) no  1-->yes 1-->yes 1-->yes  0-->no 0-->no   Q3 Suicidal Thought Method no  0-->no 0-->no 1-->yes      Q4 Suicidal Intent without Specific Plan no  1-->yes 0-->no 0-->no      Q5 Suicide Intent with Specific Plan no  0-->no 0-->no 1-->yes      Q6 Suicide Behavior (Lifetime) yes  1-->yes 0-->no 1-->yes  0-->no 0-->no   If yes to Q6, within past 3 months? no  1-->yes  0-->no  0-->no       Level of Risk per Screen moderate risk  high risk  moderate risk  high risk   no risks indicated  no risks indicated    1. Wish to be Dead (Lifetime)     N     2. Non-Specific Active Suicidal Thoughts (Lifetime)     N         Data saved with a  previous flowsheet row definition         ASSESSMENT: Current Emotional / Mental Status (status of significant symptoms):   Risk status (Self / Other harm or suicidal ideation)   Patient denies current fears or concerns for personal safety.   Patient denies current or recent suicidal ideation or behaviors.   Patient denies current or recent homicidal ideation or behaviors.   Patient denies current or recent self injurious behavior or ideation.   Patient denies other safety concerns.   Patient reports there has been no change in risk factors since their last session.     Patient reports there has been no change in protective factors since their last session.     A safety and risk management plan has been developed including: Patient consented to co-developed safety plan on 6/4/24.  Safety and risk management plan was reviewed.   Patient agreed to use safety plan should any safety concerns arise.  A copy was made available to the patient.     Appearance:   Appropriate    Eye Contact:   Good    Psychomotor Behavior: Normal    Attitude:   Cooperative  Friendly   Orientation:   All   Speech    Rate / Production: Talkative    Volume:  Normal    Mood:    Normal   Affect:    Appropriate  Bright    Thought Content:  Clear    Thought Form:  Goal Directed    Insight:    Good      Medication Review:   No changes to current psychiatric medication(s)     Medication Compliance:   Yes     Changes in Health Issues:   None reported     Chemical Use Review:   Substance Use: Chemical use reviewed, no active concerns identified      Tobacco Use: No current tobacco use.      Diagnosis:  1. 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 Generalized anxiety disorder        Collateral Reports Completed:   Not Applicable    PLAN: (Patient Tasks / Therapist Tasks / Other)  Continue following safety plan.  Transfer to [facility] for ongoing therapy.    EWELINA Billingsley                                                          ______________________________________________________________________    Individual Treatment Plan    Patient's Name: Randi Cleary  YOB: 1955    Date of Creation: 7/17/2024  Date Treatment Plan Last Reviewed/Revised: 7/17/2024, 10/30/24, 2/3/2025, 5/7/2025     DSM5 Diagnoses:   296.33 (F33.2) Major Depressive Disorder, Recurrent Episode, Severe   300.02 (F41.1) Generalized Anxiety Disorder  Psychosocial / Contextual Factors: history of trauma  PROMIS (reviewed every 90 days):     Referral / Collaboration:  Discussed and patient will pursue a therapy group for depressive symptoms.    Anticipated number of session for this episode of care: 9-12 sessions  Anticipation frequency of session: Weekly  Anticipated Duration of each session: 38-52 minutes  Treatment plan will be reviewed in 90 days or when goals have been changed.       MeasurableTreatment Goal(s) related to diagnosis / functional impairment(s)  Goal 1: Client will keep self safe and eliminate suicidal ideation and self-harm behaviors.    Objective #A (Client Action)    Client will make a list of at least 10 skills or activities that you will to use to distract from urges to harm self.  Status: Completed - Date: 10/30/24      Intervention(s)  Therapist will provide ideas and strategies for generating coping skills list.    Objective #B  Client will make a list of pros and cons for tolerating and not tolerating an urge to harm self.  Status: Continued - Date(s): 5/7/2025     Intervention(s)  Therapist will guide client through DBT-style Pros/Cons list for behavior change.    Objective #C  Client will identify and practice the new strategies for dealing with strong emotions, learn and practice relaxation breathing.  Status: Continued - Date(s): 6/30/25      Intervention(s)  Therapist will teach distraction skills. Practice them within session and outside of session.    Goal 2: Client will report reduction in  anxiety also as evidenced by reduction of CHAVO-7 score below 6 points within the next 12 weeks.    Objective #A (Client Action)    Client will identify at least three triggers for anxiety.  Status: Completed - Date: 10/30/24      Intervention(s)  Therapist will provide educational materials on common triggers and signs of anxiety.    Objective #B  Client will use relaxation strategies at least two times per day to reduce the physical symptoms of anxiety.  Status: Continued - Date(s): 5/7/2025     Intervention(s)  Therapist will teach relaxation strategies such as mindfulness, deep breathing, muscle relaxation, and sensory activities.    Objective #C  Client will use cognitive strategies identified in therapy to challenge anxious thoughts.  Status: Continued - Date(s): 5/7/2025     Intervention(s)  Therapist will teach strategies for cognitive modification using REBT model.    Goal 3: Client will report improved mood as indicated by PHQ-9 score below 6 for consistent 8 weeks.    Objective #A (Client Action)    Status: Continued - Date: 5/7/2025     Client will Increase interest, engagement, and pleasure in doing things.    Intervention(s)  Therapist will assign homework for daily involvement in pleasant activities.    Objective #B  Client will Identify negative self-talk and behaviors: challenge core beliefs, myths, and actions.    Status: Continued - Date(s): 5/7/2025     Intervention(s)  Therapist will teach strategies for cognitive modification using REBT models.    Objective #C  Client will Improve quantity and quality of night time sleep / decrease daytime naps.  Status: Continued - Date(s): 5/7/2025     Intervention(s)  Therapist will teach sleep hygiene strategies.  Assign homework for daily practice.    Goal 4: Client will report reduced or eliminated physiological activation when recalling a distressing event including decreased re-experiencing, decreased avoidance, and decreased hyperarousal.    Objective #A  (Client Action)    Status: Continued: 5/7/25      Client will acquire knowledge of trauma, common symptoms post-trauma, emotion regulation strategies, stress management strategies, and cognitive coping strategies.    Intervention(s)  Therapist will teach about effects of trauma on mind and body; encourage emotion identification and expression; practice with client the stress management strategies; and teach cognitive modification.    Objective #B  Client will use Brainspotting while focusing on physiological sensations related to distressing events.  Client will assess and rate level of physiological activation.  Status: Continued - Date(s): 5/7/2025     Intervention(s)  Therapist will provide use a pointer or other materials to assist client in holding a brainspot in their visual field.  Therapist might also provide biolateral music through headphones to deepen the experience.         Patient has reviewed and agreed to the above plan.      Erika Colorado, LMFT  July 17, 2024

## 2025-07-01 ENCOUNTER — OFFICE VISIT (OUTPATIENT)
Dept: PSYCHIATRY | Facility: CLINIC | Age: 70
End: 2025-07-01
Payer: MEDICARE

## 2025-07-01 DIAGNOSIS — F33.2 SEVERE EPISODE OF RECURRENT MAJOR DEPRESSIVE DISORDER, WITHOUT PSYCHOTIC FEATURES (H): Primary | ICD-10-CM

## 2025-07-01 DIAGNOSIS — F41.1 GENERALIZED ANXIETY DISORDER: ICD-10-CM

## 2025-07-02 ENCOUNTER — ANESTHESIA (OUTPATIENT)
Dept: BEHAVIORAL HEALTH | Facility: CLINIC | Age: 70
End: 2025-07-02
Payer: MEDICARE

## 2025-07-02 ENCOUNTER — HOSPITAL ENCOUNTER (EMERGENCY)
Facility: CLINIC | Age: 70
Discharge: HOME | End: 2025-07-02
Attending: EMERGENCY MEDICINE
Payer: MEDICARE

## 2025-07-02 ENCOUNTER — HOSPITAL ENCOUNTER (OUTPATIENT)
Dept: BEHAVIORAL HEALTH | Facility: CLINIC | Age: 70
Discharge: HOME | End: 2025-07-02
Attending: PSYCHIATRY & NEUROLOGY
Payer: MEDICARE

## 2025-07-02 ENCOUNTER — ANESTHESIA EVENT (OUTPATIENT)
Dept: BEHAVIORAL HEALTH | Facility: CLINIC | Age: 70
End: 2025-07-02

## 2025-07-02 VITALS
SYSTOLIC BLOOD PRESSURE: 105 MMHG | TEMPERATURE: 97.9 F | HEART RATE: 86 BPM | OXYGEN SATURATION: 93 % | DIASTOLIC BLOOD PRESSURE: 80 MMHG | RESPIRATION RATE: 18 BRPM

## 2025-07-02 VITALS
HEIGHT: 66 IN | BODY MASS INDEX: 28.77 KG/M2 | DIASTOLIC BLOOD PRESSURE: 83 MMHG | OXYGEN SATURATION: 95 % | TEMPERATURE: 97.6 F | HEART RATE: 90 BPM | SYSTOLIC BLOOD PRESSURE: 105 MMHG | RESPIRATION RATE: 16 BRPM | WEIGHT: 179 LBS

## 2025-07-02 DIAGNOSIS — I48.0 PAF (PAROXYSMAL ATRIAL FIBRILLATION) (H): ICD-10-CM

## 2025-07-02 DIAGNOSIS — F33.2 SEVERE RECURRENT MAJOR DEPRESSION WITHOUT PSYCHOTIC FEATURES (H): Primary | ICD-10-CM

## 2025-07-02 DIAGNOSIS — R42 DIZZINESS: ICD-10-CM

## 2025-07-02 LAB
ALBUMIN SERPL BCG-MCNC: 3.7 G/DL (ref 3.5–5.2)
ALBUMIN UR-MCNC: NEGATIVE MG/DL
ALP SERPL-CCNC: 69 U/L (ref 40–150)
ALT SERPL W P-5'-P-CCNC: 15 U/L (ref 0–50)
ANION GAP SERPL CALCULATED.3IONS-SCNC: 15 MMOL/L (ref 7–15)
APPEARANCE UR: CLEAR
AST SERPL W P-5'-P-CCNC: 23 U/L (ref 0–45)
ATRIAL RATE - MUSE: 72 BPM
BASOPHILS # BLD AUTO: 0 10E3/UL (ref 0–0.2)
BASOPHILS NFR BLD AUTO: 0 %
BILIRUB SERPL-MCNC: 0.3 MG/DL
BILIRUB UR QL STRIP: NEGATIVE
BUN SERPL-MCNC: 13.1 MG/DL (ref 8–23)
CALCIUM SERPL-MCNC: 8.9 MG/DL (ref 8.8–10.4)
CHLORIDE SERPL-SCNC: 101 MMOL/L (ref 98–107)
COLOR UR AUTO: ABNORMAL
CREAT SERPL-MCNC: 0.52 MG/DL (ref 0.51–0.95)
DIASTOLIC BLOOD PRESSURE - MUSE: NORMAL MMHG
EGFRCR SERPLBLD CKD-EPI 2021: >90 ML/MIN/1.73M2
EOSINOPHIL # BLD AUTO: 0.1 10E3/UL (ref 0–0.7)
EOSINOPHIL NFR BLD AUTO: 1 %
ERYTHROCYTE [DISTWIDTH] IN BLOOD BY AUTOMATED COUNT: 12.1 % (ref 10–15)
GLUCOSE SERPL-MCNC: 167 MG/DL (ref 70–99)
GLUCOSE UR STRIP-MCNC: NEGATIVE MG/DL
HCO3 SERPL-SCNC: 22 MMOL/L (ref 22–29)
HCT VFR BLD AUTO: 42.9 % (ref 35–47)
HGB BLD-MCNC: 15.4 G/DL (ref 11.7–15.7)
HGB UR QL STRIP: NEGATIVE
IMM GRANULOCYTES # BLD: 0 10E3/UL
IMM GRANULOCYTES NFR BLD: 0 %
INTERPRETATION ECG - MUSE: NORMAL
KETONES UR STRIP-MCNC: NEGATIVE MG/DL
LEUKOCYTE ESTERASE UR QL STRIP: NEGATIVE
LYMPHOCYTES # BLD AUTO: 1.1 10E3/UL (ref 0.8–5.3)
LYMPHOCYTES NFR BLD AUTO: 14 %
MAGNESIUM SERPL-MCNC: 1.8 MG/DL (ref 1.7–2.3)
MCH RBC QN AUTO: 33.5 PG (ref 26.5–33)
MCHC RBC AUTO-ENTMCNC: 35.9 G/DL (ref 31.5–36.5)
MCV RBC AUTO: 93 FL (ref 78–100)
MONOCYTES # BLD AUTO: 0.5 10E3/UL (ref 0–1.3)
MONOCYTES NFR BLD AUTO: 6 %
MUCOUS THREADS #/AREA URNS LPF: PRESENT /LPF
NEUTROPHILS # BLD AUTO: 6.5 10E3/UL (ref 1.6–8.3)
NEUTROPHILS NFR BLD AUTO: 79 %
NITRATE UR QL: NEGATIVE
NRBC # BLD AUTO: 0 10E3/UL
NRBC BLD AUTO-RTO: 0 /100
P AXIS - MUSE: NORMAL DEGREES
PH UR STRIP: 5.5 [PH] (ref 5–7)
PLATELET # BLD AUTO: 181 10E3/UL (ref 150–450)
POTASSIUM SERPL-SCNC: 3.9 MMOL/L (ref 3.4–5.3)
PR INTERVAL - MUSE: NORMAL MS
PROT SERPL-MCNC: 6.5 G/DL (ref 6.4–8.3)
QRS DURATION - MUSE: 124 MS
QT - MUSE: 414 MS
QTC - MUSE: 447 MS
R AXIS - MUSE: 72 DEGREES
RBC # BLD AUTO: 4.6 10E6/UL (ref 3.8–5.2)
RBC URINE: <1 /HPF
SODIUM SERPL-SCNC: 138 MMOL/L (ref 135–145)
SP GR UR STRIP: 1.01 (ref 1–1.03)
SQUAMOUS EPITHELIAL: <1 /HPF
SYSTOLIC BLOOD PRESSURE - MUSE: NORMAL MMHG
T AXIS - MUSE: 20 DEGREES
TROPONIN T SERPL HS-MCNC: 12 NG/L
TROPONIN T SERPL HS-MCNC: 13 NG/L
TSH SERPL DL<=0.005 MIU/L-ACNC: 1.32 UIU/ML (ref 0.3–4.2)
UROBILINOGEN UR STRIP-MCNC: NORMAL MG/DL
VENTRICULAR RATE- MUSE: 70 BPM
WBC # BLD AUTO: 8.3 10E3/UL (ref 4–11)
WBC URINE: <1 /HPF

## 2025-07-02 PROCEDURE — 81001 URINALYSIS AUTO W/SCOPE: CPT | Performed by: EMERGENCY MEDICINE

## 2025-07-02 PROCEDURE — 93010 ELECTROCARDIOGRAM REPORT: CPT | Performed by: EMERGENCY MEDICINE

## 2025-07-02 PROCEDURE — 83735 ASSAY OF MAGNESIUM: CPT | Performed by: EMERGENCY MEDICINE

## 2025-07-02 PROCEDURE — 250N000011 HC RX IP 250 OP 636: Performed by: EMERGENCY MEDICINE

## 2025-07-02 PROCEDURE — 250N000011 HC RX IP 250 OP 636: Performed by: ANESTHESIOLOGY

## 2025-07-02 PROCEDURE — 36415 COLL VENOUS BLD VENIPUNCTURE: CPT | Performed by: EMERGENCY MEDICINE

## 2025-07-02 PROCEDURE — 90870 ELECTROCONVULSIVE THERAPY: CPT

## 2025-07-02 PROCEDURE — 370N000017 HC ANESTHESIA TECHNICAL FEE, PER MIN: Performed by: ANESTHESIOLOGY

## 2025-07-02 PROCEDURE — 84484 ASSAY OF TROPONIN QUANT: CPT | Performed by: EMERGENCY MEDICINE

## 2025-07-02 PROCEDURE — 85004 AUTOMATED DIFF WBC COUNT: CPT | Performed by: EMERGENCY MEDICINE

## 2025-07-02 PROCEDURE — 258N000003 HC RX IP 258 OP 636: Performed by: EMERGENCY MEDICINE

## 2025-07-02 PROCEDURE — 84443 ASSAY THYROID STIM HORMONE: CPT | Performed by: EMERGENCY MEDICINE

## 2025-07-02 PROCEDURE — 250N000009 HC RX 250: Performed by: ANESTHESIOLOGY

## 2025-07-02 PROCEDURE — 250N000011 HC RX IP 250 OP 636: Performed by: STUDENT IN AN ORGANIZED HEALTH CARE EDUCATION/TRAINING PROGRAM

## 2025-07-02 PROCEDURE — 99284 EMERGENCY DEPT VISIT MOD MDM: CPT | Mod: 25 | Performed by: EMERGENCY MEDICINE

## 2025-07-02 PROCEDURE — 93005 ELECTROCARDIOGRAM TRACING: CPT | Mod: RTG

## 2025-07-02 PROCEDURE — 99284 EMERGENCY DEPT VISIT MOD MDM: CPT | Performed by: EMERGENCY MEDICINE

## 2025-07-02 PROCEDURE — 250N000013 HC RX MED GY IP 250 OP 250 PS 637: Performed by: EMERGENCY MEDICINE

## 2025-07-02 PROCEDURE — 96374 THER/PROPH/DIAG INJ IV PUSH: CPT | Performed by: EMERGENCY MEDICINE

## 2025-07-02 PROCEDURE — 85018 HEMOGLOBIN: CPT | Performed by: EMERGENCY MEDICINE

## 2025-07-02 PROCEDURE — 96361 HYDRATE IV INFUSION ADD-ON: CPT | Performed by: EMERGENCY MEDICINE

## 2025-07-02 PROCEDURE — 84155 ASSAY OF PROTEIN SERUM: CPT | Performed by: EMERGENCY MEDICINE

## 2025-07-02 RX ORDER — FENTANYL CITRATE 50 UG/ML
25 INJECTION, SOLUTION INTRAMUSCULAR; INTRAVENOUS EVERY 5 MIN PRN
Status: DISCONTINUED | OUTPATIENT
Start: 2025-07-02 | End: 2025-07-03 | Stop reason: HOSPADM

## 2025-07-02 RX ORDER — HYDROMORPHONE HCL IN WATER/PF 6 MG/30 ML
0.2 PATIENT CONTROLLED ANALGESIA SYRINGE INTRAVENOUS EVERY 5 MIN PRN
Status: DISCONTINUED | OUTPATIENT
Start: 2025-07-02 | End: 2025-07-03 | Stop reason: HOSPADM

## 2025-07-02 RX ORDER — ONDANSETRON 2 MG/ML
4 INJECTION INTRAMUSCULAR; INTRAVENOUS EVERY 30 MIN PRN
Status: DISCONTINUED | OUTPATIENT
Start: 2025-07-02 | End: 2025-07-03 | Stop reason: HOSPADM

## 2025-07-02 RX ORDER — NALOXONE HYDROCHLORIDE 0.4 MG/ML
0.1 INJECTION, SOLUTION INTRAMUSCULAR; INTRAVENOUS; SUBCUTANEOUS
Status: DISCONTINUED | OUTPATIENT
Start: 2025-07-02 | End: 2025-07-03 | Stop reason: HOSPADM

## 2025-07-02 RX ORDER — ROPINIROLE 2 MG/1
2 TABLET, FILM COATED ORAL ONCE
Status: COMPLETED | OUTPATIENT
Start: 2025-07-02 | End: 2025-07-02

## 2025-07-02 RX ORDER — FENTANYL CITRATE 50 UG/ML
50 INJECTION, SOLUTION INTRAMUSCULAR; INTRAVENOUS EVERY 5 MIN PRN
Status: DISCONTINUED | OUTPATIENT
Start: 2025-07-02 | End: 2025-07-03 | Stop reason: HOSPADM

## 2025-07-02 RX ORDER — KETOROLAC TROMETHAMINE 30 MG/ML
30 INJECTION, SOLUTION INTRAMUSCULAR; INTRAVENOUS ONCE
Start: 2025-07-02 | End: 2025-07-02

## 2025-07-02 RX ORDER — SODIUM CHLORIDE, SODIUM LACTATE, POTASSIUM CHLORIDE, CALCIUM CHLORIDE 600; 310; 30; 20 MG/100ML; MG/100ML; MG/100ML; MG/100ML
INJECTION, SOLUTION INTRAVENOUS CONTINUOUS
Status: DISCONTINUED | OUTPATIENT
Start: 2025-07-02 | End: 2025-07-03 | Stop reason: HOSPADM

## 2025-07-02 RX ORDER — ONDANSETRON 4 MG/1
4 TABLET, ORALLY DISINTEGRATING ORAL EVERY 30 MIN PRN
Status: DISCONTINUED | OUTPATIENT
Start: 2025-07-02 | End: 2025-07-03 | Stop reason: HOSPADM

## 2025-07-02 RX ORDER — ONDANSETRON 2 MG/ML
4 INJECTION INTRAMUSCULAR; INTRAVENOUS ONCE
Status: COMPLETED | OUTPATIENT
Start: 2025-07-02 | End: 2025-07-02

## 2025-07-02 RX ORDER — KETOROLAC TROMETHAMINE 30 MG/ML
30 INJECTION, SOLUTION INTRAMUSCULAR; INTRAVENOUS ONCE
Status: COMPLETED | OUTPATIENT
Start: 2025-07-02 | End: 2025-07-02

## 2025-07-02 RX ORDER — METHOHEXITAL IN WATER/PF 100MG/10ML
SYRINGE (ML) INTRAVENOUS PRN
Status: DISCONTINUED | OUTPATIENT
Start: 2025-07-02 | End: 2025-07-02

## 2025-07-02 RX ORDER — LABETALOL HYDROCHLORIDE 5 MG/ML
INJECTION, SOLUTION INTRAVENOUS PRN
Status: DISCONTINUED | OUTPATIENT
Start: 2025-07-02 | End: 2025-07-02

## 2025-07-02 RX ORDER — HYDROMORPHONE HCL IN WATER/PF 6 MG/30 ML
0.4 PATIENT CONTROLLED ANALGESIA SYRINGE INTRAVENOUS EVERY 5 MIN PRN
Status: DISCONTINUED | OUTPATIENT
Start: 2025-07-02 | End: 2025-07-03 | Stop reason: HOSPADM

## 2025-07-02 RX ORDER — DEXAMETHASONE SODIUM PHOSPHATE 4 MG/ML
4 INJECTION, SOLUTION INTRA-ARTICULAR; INTRALESIONAL; INTRAMUSCULAR; INTRAVENOUS; SOFT TISSUE
Status: DISCONTINUED | OUTPATIENT
Start: 2025-07-02 | End: 2025-07-03 | Stop reason: HOSPADM

## 2025-07-02 RX ADMIN — Medication 100 MG: at 11:39

## 2025-07-02 RX ADMIN — KETOROLAC TROMETHAMINE 30 MG: 30 INJECTION, SOLUTION INTRAMUSCULAR at 11:29

## 2025-07-02 RX ADMIN — SUCCINYLCHOLINE CHLORIDE 90 MG: 20 INJECTION, SOLUTION INTRAMUSCULAR; INTRAVENOUS; PARENTERAL at 11:39

## 2025-07-02 RX ADMIN — ROPINIROLE HYDROCHLORIDE 2 MG: 2 TABLET, FILM COATED ORAL at 15:59

## 2025-07-02 RX ADMIN — LABETALOL HYDROCHLORIDE 20 MG: 5 INJECTION, SOLUTION INTRAVENOUS at 11:39

## 2025-07-02 RX ADMIN — SODIUM CHLORIDE 1000 ML: 0.9 INJECTION, SOLUTION INTRAVENOUS at 13:12

## 2025-07-02 RX ADMIN — SODIUM CHLORIDE 1000 ML: 0.9 INJECTION, SOLUTION INTRAVENOUS at 16:02

## 2025-07-02 RX ADMIN — ONDANSETRON 4 MG: 2 INJECTION INTRAMUSCULAR; INTRAVENOUS at 18:15

## 2025-07-02 ASSESSMENT — ACTIVITIES OF DAILY LIVING (ADL)
ADLS_ACUITY_SCORE: 59

## 2025-07-02 ASSESSMENT — COPD QUESTIONNAIRES: COPD: 1

## 2025-07-02 NOTE — PROGRESS NOTES
Pt arrived to PACU post ECT and she was noted to be in atrial fibrillation.  Dr. Cotter, psychiatrist, and Dr. Perez, anesthesiologist were notified.  Pt was initially hypotensive 82/59 but her BP improved to 105/80.  Pt did state that she was feeling dizzy this morning when she woke up.  The decision was made to transfer the pt to the ED as she continues to be in atrial fibrillation.  Her heart rate has been 82-99.  Report was given to CHRISTINA Machado and pt was transferred via transport staff to the ED.

## 2025-07-02 NOTE — ANESTHESIA PREPROCEDURE EVALUATION
Anesthesia Pre-Procedure Evaluation    Patient: Randi Cleary   MRN: [id number] : 1955        Procedure :   Electroconvulsive Therapy       Past Medical History:   Diagnosis Date    Bipolar 2 disorder (H)     Breast cancer (H)     lumpectomy, radiation, chemo    Chronic pain syndrome     COPD (chronic obstructive pulmonary disease) (H)     asthma    Cord compression (H) 2021    Dizzy     Drug tolerance     opioid    Esophageal reflux     Fatigue     Generalized anxiety disorder     Graves disease 1994    Hemochromatosis 2018    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; H63D not detected    History of corticosteroid therapy 2019    History of partial adrenalectomy 2019    Hx antineoplastic chemotherapy     Hx of radiation therapy     Hyperlipidaemia     Hypertension     Impaired fasting glucose     Infection due to 2019 novel coronavirus 2021    Injury of neck, whiplash 07/15/2021    Joint pain     KYAW (obstructive sleep apnea) 2016    Osteopenia     Paroxysmal atrial fibrillation (H) 2024    Pheochromocytoma, left 2017    laparoscopically removed    Postablative hypothyroidism 1995    Prediabetes 10/03/2019    by A1c    Psoriasis     Psoriatic arthropathy (H)     Pulmonary hypertension (H)     Right rotator cuff tear     RLS (restless legs syndrome)     on ropinorole    Sacroiliitis     Serotonin syndrome 2020    [facility] - While on desvenlafaxine 100mg    Snoring     Spinal stenosis     Status post coronary angiogram 10/03/2019    Urinary incontinence     Vitamin B 12 deficiency     Vitamin D deficiency       Past Surgical History:   Procedure Laterality Date    ARTHRODESIS ANKLE      ARTHROPLASTY ANKLE Right 2015    Procedure: ARTHROPLASTY ANKLE;  Surgeon: Jason Coughlin MD;  [facility]    ARTHROPLASTY REVISION ANKLE Right 2015    Procedure: ARTHROPLASTY REVISION ANKLE;  Surgeon: Jason Coughlin MD;  Location: [facility]    BIOPSY  BREAST      BREAST BIOPSY, CORE RT/LT      COLONOSCOPY      COLONOSCOPY N/A 2/25/2021    Procedure: COLONOSCOPY;  Surgeon: Guru Elke Tolbert MD;  Location:  GI    CV CORONARY ANGIOGRAM N/A 10/3/2019    Procedure: CV CORONARY ANGIOGRAM;  Surgeon: Bryce Pierre MD;  Location:  HEART CARDIAC CATH LAB    CV RIGHT HEART CATH MEASUREMENTS RECORDED N/A 10/3/2019    Procedure: CV RIGHT HEART CATH;  Surgeon: Bryce Pierre MD;  Location:  HEART CARDIAC CATH LAB    CV RIGHT HEART CATH MEASUREMENTS RECORDED N/A 9/25/2024    Procedure: Heart Cath Right Heart Cath;  Surgeon: George Becker MD;  Location:  HEART CARDIAC CATH LAB    ESOPHAGOSCOPY, GASTROSCOPY, DUODENOSCOPY (EGD), COMBINED N/A 2/25/2021    Procedure: ESOPHAGOGASTRODUODENOSCOPY, WITH BIOPSY;  Surgeon: Guru Elke Tolbert MD;  Location:  GI    EYE SURGERY  2021    HC REMOVE TONSILS/ADENOIDS,<13 Y/O      Description: Tonsillectomy With Adenoidectomy;  Recorded: 04/07/2010;    IR LUMBAR EPIDURAL STEROID INJECTION  10/26/2004    IR LUMBAR EPIDURAL STEROID INJECTION  11/16/2004    IR LUMBAR EPIDURAL STEROID INJECTION  12/21/2004    IR LUMBAR EPIDURAL STEROID INJECTION  6/8/2006    JOINT REPLACEMENT      LAMINOPLASTY CERVICAL POSTERIOR THREE+ LEVELS Left 12/21/2021    Procedure: CERVICAL 3-CERVICAL 6 LEFT OPEN DOOR LAMINOPLASTY AND LEFT CERVICAL 4-5 AND CERVICAL 6-7 POSTERIOR FORAMINOTOMY;  Surgeon: Angela Gregory MD;  Location: [facility] OR    LAPAROSCOPIC ADRENALECTOMY Left 08/02/2017    pheochromocytoma    LAPAROSCOPIC ADRENALECTOMY Left 8/2/2017    Procedure: LAPAROSCOPIC LEFT ADRENALECTOMY, ;  Surgeon: Gab Linares MD;  Location: [facility] [place];  Service:     LENGTHEN TENDON ACHILLES Right 6/29/2015    Procedure: LENGTHEN TENDON ACHILLES;  Surgeon: Jason Coughlin MD;  Location: [facility]    LUMPECTOMY BREAST      LUMPECTOMY BREAST Left 1994    MAMMOPLASTY REDUCTION Right 01/13/2015     "[place]    MAMMOPLASTY REDUCTION Right     approx late /[id number]    MASTECTOMY      left lumpectomy with axillary node dissection    MASTECTOMY MODIFIED RADICAL      OTHER SURGICAL HISTORY Right     reconstructive breast surgery    OTHER SURGICAL HISTORY      Adrenalectomy for pheochromocytoma    MO MASTECTOMY, MODIFIED RADICAL      Description: Modified Radical Mastectomy Left Breast;  Recorded: 2010;    REPAIR HAMMER TOE Right 2015    Procedure: REPAIR HAMMER TOE;  Surgeon: Jason Coughlin MD;  Location: [facility]    TONSILLECTOMY      TONSILLECTOMY & ADENOIDECTOMY      ZC ARTHRODESIS,ANKLE,OPEN Right     Description: Ankle Arthrodesis;  Recorded: 2010;      Allergies   Allergen Reactions    Serotonin Reuptake Inhibitors (Ssris) Anxiety, Difficulty breathing, Headache, Palpitations and Shortness Of Breath    Bupropion Unknown    Buspirone      The patient states she had serotonin syndrome    Cephalexin      Other reaction(s): unknown rxn.    Desvenlafaxine      Serotonin syndrome    Diclofenac Sodium [Diclofenac]      Serotonin syndrome and restless legs syndrome    Gabapentin      Drove on the wrong side of the highway    Levofloxacin      \"CAN'T REMEMBER\"    Penicillins      \"SORES IN MOUTH\"    Riluzole Difficulty breathing and Swelling    Riluzole Unknown    Sulfa Antibiotics      Chronic cough in sulfa containing diuretic     Topiramate Other (See Comments)     Frequent urination    Dextromethorphan Anxiety      Social History     Tobacco Use    Smoking status: Former     Current packs/day: 0.00     Average packs/day: 2.5 packs/day for 29.2 years (72.9 ttl pk-yrs)     Types: Cigarettes     Start date: 1971     Quit date: 2000     Years since quittin.0     Passive exposure: Current    Smokeless tobacco: Never   Substance Use Topics    Alcohol use: Not Currently     Comment: relapse 2021 sober       Wt Readings from Last 1 Encounters:   25 79.4 kg (175 lb)    "     Anesthesia Evaluation   Pt has had prior anesthetic.     History of anesthetic complications       ROS/MED HX  ENT/Pulmonary:     (+) sleep apnea,                         COPD,              Neurologic:       Cardiovascular: Comment: pAfib & pHTN per chart    (+)  hypertension- -   -  - -                                pulmonary hypertension, Previous cardiac testing   Echo: Date: 11/2024 Results:  Global and regional left ventricular function is normal with an EF of 55-60%.  Right ventricular function, chamber size, wall motion, and thickness are normal.  No significant valvular abnormalities were noted.  This study was compared with the study from 10/5/23. No significant changes noted.  Stress Test:  Date: Results:    ECG Reviewed:  Date: Results:    Cath:  Date: Results:      METS/Exercise Tolerance:     Hematologic: Comments: Hemochromatosis       Musculoskeletal: Comment: Osteopenia  Sacral joint pain  Psoriatic arthritis  (+)  arthritis,             GI/Hepatic:     (+) GERD,     hiatal hernia,              Renal/Genitourinary:       Endo:     (+)          thyroid problem,     Obesity,       Psychiatric/Substance Use:     (+) psychiatric history bipolar       Infectious Disease:       Malignancy: Comment: Breast cancer      Other:            Physical Exam    Airway  airway exam normal      Mallampati: II   TM distance: > 3 FB   Neck ROM: full   Mouth opening: > 3 cm    Respiratory Devices and Support         Dental       (+) Modest Abnormalities - crowns, retainers, 1 or 2 missing teeth      Cardiovascular   cardiovascular exam normal          Pulmonary   pulmonary exam normal                OUTSIDE LABS:  CBC:   Lab Results   Component Value Date    WBC 4.6 06/10/2025    WBC 5.8 02/25/2025    HGB 15.9 (H) 06/10/2025    HGB 15.9 (H) 05/01/2025    HCT 45.5 06/10/2025    HCT 48.7 (H) 02/25/2025     06/10/2025     02/25/2025     BMP:   Lab Results   Component Value Date     06/10/2025    NA  142 02/25/2025    POTASSIUM 4.8 06/10/2025    POTASSIUM 5.3 05/01/2025    CHLORIDE 103 06/10/2025    CHLORIDE 104 02/25/2025    CO2 28 06/10/2025    CO2 27 02/25/2025    BUN 15.5 06/10/2025    BUN 15.4 02/25/2025    CR 0.57 06/10/2025    CR 0.58 02/25/2025    GLC 85 06/10/2025    GLC 76 02/25/2025     COAGS:   Lab Results   Component Value Date    PTT 34 12/13/2021    INR 0.99 11/23/2024     POC:   Lab Results   Component Value Date     (H) 02/25/2021     HEPATIC:   Lab Results   Component Value Date    ALBUMIN 4.1 06/10/2025    PROTTOTAL 6.7 06/10/2025    ALT 19 06/10/2025    AST 27 06/10/2025    ALKPHOS 61 06/10/2025    BILITOTAL 0.5 06/10/2025     OTHER:   Lab Results   Component Value Date    A1C 6.0 (H) 05/01/2025    LINDA 9.3 06/10/2025    MAG 1.9 11/22/2024    TSH 0.83 06/10/2025    T4 1.36 06/10/2025    T3 114 01/18/2023    CRP <2.9 11/16/2021    SED 8 06/10/2025       Anesthesia Plan    ASA Status:  3    NPO Status:  NPO Appropriate    Anesthesia Type: General Mask Only.   Induction: Intravenous.           Consents    Anesthesia Plan(s) and associated risks, benefits, and realistic alternatives discussed. Questions answered and patient/representative(s) expressed understanding.     - Discussed:     - Discussed with:  Patient           - Extended Intubation/Ventilatory Support Discussed: No.     - Pt is DNR/DNI Status: no DNR       Blood Consent:         - Use of Blood Products Discussed: No      Postoperative Care    Pain management: Oral pain medications.   PONV prophylaxis: Ondansetron (or other 5HT-3)     Comments:                 Carmen Saldana MD    I have reviewed the pertinent notes and labs in the chart from the past 30 days and (re)examined the patient.  Any updates or changes from those notes are reflected in this note.    Clinically Significant Risk Factors Present on Admission                                          Physical Exam  Airway  Mallampati: II  TM distance: > 3 FB  Neck ROM:  full    Cardiovascular - normal exam Comments: pAfib & pHTN per chart  Dental   (+) Modest Abnormalities - crowns, retainers, 1 or 2 missing teeth      Pulmonary - normal exam      Neurological   Other Findings     Anesthesia Plan    ASA Status:  3      NPO Status: NPO Appropriate   Anesthesia Type: General Mask Only.        Consents    Anesthesia Plan(s) and associated risks, benefits, and realistic alternatives discussed. Questions answered and patient/representative(s) expressed understanding.     - Discussed:     - Discussed with:  Patient        - Pt is DNR/DNI Status: no DNR          Postoperative Care         Comments:

## 2025-07-02 NOTE — ED TRIAGE NOTES
Pt comes from ECT due to an episode of afib. The last time this occurred was also after   An ECT in November. She is not having any symptoms and has some documented refusals of treatment.

## 2025-07-02 NOTE — ED NOTES
Pt assisted to bedside commode. Pt frustrated and upset that she is in ED, tearful how the day has played out. Writer actively listened to pts frustrations. Writer gave pt warm blankets per pt request. Pt still tearful, but no other needs expressed at this time.

## 2025-07-02 NOTE — ED PROVIDER NOTES
ED Provider Note  [facility]      History     Chief Complaint   Patient presents with    Irregular Heart Beat     HPI  Randi Cleary is a 69 year old female with a history of paroxysmal atrial fibrillation, pulmonary hypertension, hemochromatosis, psoriatic arthritis, CHAVO, restless leg syndrome, HTN, Graves' disease, bipolar 2 disorder, COPD who presents to the emergency department with an abnormal heart rhythm.  Patient underwent electroconvulsive therapy this morning and was noted to be in atrial fibrillation after the procedure.    Patient reports she was feeling dizzy earlier this morning.  She notes she typically gets dizzy when she does not drink enough fluids and as she was having a procedure today, she did not drink any fluids.  She has also felt dizzy here in the ED.  Patient also notes that she was diagnosed with atrial fibrillation back in November and got a Zio patch monitor.  She states she never went into atrial fibrillation again and refused to be put on blood thinners.  Patient also notes she lowered her dose of Synthroid recently. Patient denies chest pain, shortness of breath, nausea, vomiting, other cardiac diagnoses.        Past Medical History  Past Medical History:   Diagnosis Date    Bipolar 2 disorder (H)     Breast cancer (H) 1986    lumpectomy, radiation, chemo    Chronic pain syndrome     COPD (chronic obstructive pulmonary disease) (H)     asthma    Cord compression (H) 12/21/2021    Dizzy     Drug tolerance     opioid    Esophageal reflux     Fatigue     Generalized anxiety disorder     Graves disease 1994    Hemochromatosis 02/14/2018    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; H63D not detected    History of corticosteroid therapy 11/19/2019    History of partial adrenalectomy 11/19/2019    Hx antineoplastic chemotherapy     Hx of radiation therapy     Hyperlipidaemia     Hypertension     Impaired fasting glucose 2017    Infection due to 2019 novel coronavirus 06/04/2021     Injury of neck, whiplash 07/15/2021    Joint pain     KYAW (obstructive sleep apnea) 2016    Osteopenia     Paroxysmal atrial fibrillation (H) 11/2024    Pheochromocytoma, left 08/02/2017    laparoscopically removed    Postablative hypothyroidism 1995    Prediabetes 10/03/2019    by A1c    Psoriasis     Psoriatic arthropathy (H)     Pulmonary hypertension (H)     Right rotator cuff tear     RLS (restless legs syndrome)     on ropinorole    Sacroiliitis     Serotonin syndrome 08/28/2020    [facility] - While on desvenlafaxine 100mg    Snoring     Spinal stenosis     Status post coronary angiogram 10/03/2019    Urinary incontinence     Vitamin B 12 deficiency 2009    Vitamin D deficiency 2010     Past Surgical History:   Procedure Laterality Date    ARTHRODESIS ANKLE      ARTHROPLASTY ANKLE Right 6/29/2015    Procedure: ARTHROPLASTY ANKLE;  Surgeon: Jason Coughlin MD;  Location: [facility]    ARTHROPLASTY REVISION ANKLE Right 6/29/2015    Procedure: ARTHROPLASTY REVISION ANKLE;  Surgeon: Jason Coughlin MD;  Location: [facility]    BIOPSY BREAST      BREAST BIOPSY, CORE RT/LT      COLONOSCOPY      COLONOSCOPY N/A 2/25/2021    Procedure: COLONOSCOPY;  Surgeon: Guru Elke Tolbert MD;  Location:  GI    CV CORONARY ANGIOGRAM N/A 10/3/2019    Procedure: CV CORONARY ANGIOGRAM;  Surgeon: Bryce Pierre MD;  Location:  HEART CARDIAC CATH LAB    CV RIGHT HEART CATH MEASUREMENTS RECORDED N/A 10/3/2019    Procedure: CV RIGHT HEART CATH;  Surgeon: Bryce Pierre MD;  Location:  HEART CARDIAC CATH LAB    CV RIGHT HEART CATH MEASUREMENTS RECORDED N/A 9/25/2024    Procedure: Heart Cath Right Heart Cath;  Surgeon: George Becker MD;  Location:  HEART CARDIAC CATH LAB    ESOPHAGOSCOPY, GASTROSCOPY, DUODENOSCOPY (EGD), COMBINED N/A 2/25/2021    Procedure: ESOPHAGOGASTRODUODENOSCOPY, WITH BIOPSY;  Surgeon: Guru Elke Tolbert MD;  Location:  GI    EYE SURGERY  2021      REMOVE TONSILS/ADENOIDS,<13 Y/O      Description: Tonsillectomy With Adenoidectomy;  Recorded: 04/07/2010;    IR LUMBAR EPIDURAL STEROID INJECTION  10/26/2004    IR LUMBAR EPIDURAL STEROID INJECTION  11/16/2004    IR LUMBAR EPIDURAL STEROID INJECTION  12/21/2004    IR LUMBAR EPIDURAL STEROID INJECTION  6/8/2006    JOINT REPLACEMENT      LAMINOPLASTY CERVICAL POSTERIOR THREE+ LEVELS Left 12/21/2021    Procedure: CERVICAL 3-CERVICAL 6 LEFT OPEN DOOR LAMINOPLASTY AND LEFT CERVICAL 4-5 AND CERVICAL 6-7 POSTERIOR FORAMINOTOMY;  Surgeon: Angela Gregory MD;  Location: [facility]    LAPAROSCOPIC ADRENALECTOMY Left 08/02/2017    pheochromocytoma    LAPAROSCOPIC ADRENALECTOMY Left 8/2/2017    Procedure: LAPAROSCOPIC LEFT ADRENALECTOMY, ;  Surgeon: Gab Linares MD;  [place] [facility]:     LENGTHEN TENDON ACHILLES Right 6/29/2015    Procedure: LENGTHEN TENDON ACHILLES;  Surgeon: Jason Coughlin MD;  Location: [facility]    LUMPECTOMY BREAST      LUMPECTOMY BREAST Left [facility]    MAMMOPLASTY REDUCTION Right 01/13/2015    [place]    MAMMOPLASTY REDUCTION Right     approx late 2015/[id number]    MASTECTOMY      left lumpectomy with axillary node dissection    MASTECTOMY MODIFIED RADICAL      OTHER SURGICAL HISTORY Right     reconstructive breast surgery    OTHER SURGICAL HISTORY      Adrenalectomy for pheochromocytoma    AL MASTECTOMY, MODIFIED RADICAL      Description: Modified Radical Mastectomy Left Breast;  Recorded: 04/07/2010;    REPAIR HAMMER TOE Right 6/29/2015    Procedure: REPAIR HAMMER TOE;  Surgeon: Jason Coughlin MD;  Location: [facility]    TONSILLECTOMY      TONSILLECTOMY & ADENOIDECTOMY      ZZC ARTHRODESIS,ANKLE,OPEN Right     Description: Ankle Arthrodesis;  Recorded: 04/07/2010;     acetaminophen (TYLENOL) 325 MG tablet  albuterol (VENTOLIN HFA) 108 (90 Base) MCG/ACT inhaler  B Complex-C (SUPER B COMPLEX PO)  benzonatate (TESSALON) 100 MG capsule  colchicine (COLCRYS) 0.6 MG  "tablet  Cyanocobalamin (VITAMIN B-12) 5000 MCG SUBL  ethacrynic acid (EDECRIN) 25 MG tablet  fluticasone-salmeterol (ADVAIR) 250-50 MCG/ACT inhaler  gabapentin (NEURONTIN) 100 MG capsule  gabapentin (NEURONTIN) 100 MG capsule  guaiFENesin (MUCINEX) 600 MG 12 hr tablet  ketoconazole (NIZORAL) 2 % external shampoo  KLOR-CON M20 20 MEQ CR tablet  Lavender Oil 80 MG CAPS  MAGNESIUM PO  medical cannabis (Patient's own supply)  melatonin 1 MG CAPS  Misc Natural Products (CORTISOL MANAGER PO)  naloxone (NARCAN) 4 MG/0.1ML nasal spray  OMEGA-3 FATTY ACIDS PO  omeprazole (PRILOSEC) 20 MG DR capsule  oxyCODONE (ROXICODONE) 5 MG tablet  rOPINIRole (REQUIP) 2 MG tablet  STATIN NOT PRESCRIBED (INTENTIONAL)  SYNTHROID 150 MCG tablet  UNABLE TO FIND  vitamin D3 (CHOLECALCIFEROL) 250 mcg (36071 units) capsule      Allergies   Allergen Reactions    Serotonin Reuptake Inhibitors (Ssris) Anxiety, Difficulty breathing, Headache, Palpitations and Shortness Of Breath    Bupropion Unknown    Buspirone      The patient states she had serotonin syndrome    Cephalexin      Other reaction(s): unknown rxn.    Desvenlafaxine      Serotonin syndrome    Diclofenac Sodium [Diclofenac]      Serotonin syndrome and restless legs syndrome    Gabapentin      Drove on the wrong side of the highway    Levofloxacin      \"CAN'T REMEMBER\"    Penicillins      \"SORES IN MOUTH\"    Riluzole Difficulty breathing and Swelling    Riluzole Unknown    Sulfa Antibiotics      Chronic cough in sulfa containing diuretic     Topiramate Other (See Comments)     Frequent urination    Dextromethorphan Anxiety     Family History  Family History   Problem Relation Age of Onset    Obesity Mother     Thyroid Disease Mother     Arthritis Mother     Hypertension Mother     Osteoporosis Mother     Hemochromatosis Father     Myocardial Infarction Father     Snoring Father     Heart Disease Father     Obesity Father     Coronary Artery Disease Father          at age 53 of heart " attack    Hypertension Father     Genetic Disorder Father         Hemachromatosis    Lupus Sister     Hypertension Sister     Obesity Sister     Scleroderma Sister     Heart Failure Sister     Hemochromatosis Sister     Obesity Sister     Cardiovascular Sister     Hypertension Sister     Arthritis Sister     Hemochromatosis Sister     Lupus Sister     Scleroderma Sister     Coronary Artery Disease Sister     Genetic Disorder Sister         Lupus, Hemachromatosis    Hemochromatosis Brother     Hypertension Brother     Alcoholism Brother     Substance Abuse Brother     Cardiovascular Brother     Hypertension Brother     Arthritis Brother     Hemochromatosis Brother     Prostate Cancer Brother     Genetic Disorder Brother         Hemachromatosis    Obesity Brother     Cardiovascular Maternal Grandmother     Coronary Artery Disease Maternal Grandmother     Osteoporosis Maternal Grandmother     Alcoholism Maternal Grandfather     Cerebrovascular Disease Paternal Grandmother     Mental Illness Maternal Uncle     Adrenal Disorder No family hx of     Breast Cancer No family hx of     Anesthesia Reaction No family hx of     Deep Vein Thrombosis (DVT) No family hx of      Social History   Social History     Tobacco Use    Smoking status: Former     Current packs/day: 0.00     Average packs/day: 2.5 packs/day for 29.2 years (72.9 ttl pk-yrs)     Types: Cigarettes     Start date: 1971     Quit date: 2000     Years since quittin.0     Passive exposure: Current    Smokeless tobacco: Never   Vaping Use    Vaping status: Never Used   Substance Use Topics    Alcohol use: Not Currently     Comment: relapse 2021 sober     Drug use: Yes     Types: Marijuana     Comment: smoking and edibles daily      A medically appropriate review of systems was performed with pertinent positives and negatives noted in the HPI, and all other systems negative.    Physical Exam   BP: 105/80  Pulse: 82  Temp: 98.2  F (36.8  C)  Resp:  "18  Height: 167.6 cm (5' 6\")  Weight: 81.2 kg (179 lb)  SpO2: 93 %  Physical Exam  ***    ED Course, Procedures, & Data      Procedures       {ED Course Selections (Optional):099220}  {ED Sepsis CMS Documentation (Optional):978439::\" \"}          Medications - No data to display       {Critical Care Performed?:265835}    Assessment & Plan    ***    I have reviewed the nursing notes. I have reviewed the findings, diagnosis, plan and need for follow up with the patient.    New Prescriptions    No medications on file       Final diagnoses:   None   ICari, am serving as a trained medical scribe to document services personally performed by Randi Lopez MD, based on the provider's statements to me.     IRandi MD, was physically present and have reviewed and verified the accuracy of this note documented by Cari Nogueira.     Randi Lopez MD  [facility] EMERGENCY DEPARTMENT  7/2/2025  " which revealed atrial fibrillation with a heart rate in the 70s.  IV established, labs sent revealed normal CBC and electrolytes.  Patient was observed in the emergency department over several hours and noted to have recurrent hypotensive episode which is resolved after a second liter normal saline fluid bolus.  Case discussed with cardiology consult over the phone with advisement that patient's hypotensive episodes highly unlikely attributable to her rate controlled atrial fibrillation.  Patient is given her home medications during observation and developed a mild nausea which was treated with Zofran 4 mg IV.  Prior to discharge patient was tolerating p.o. without difficulty with negative orthostatic vital signs.  Plan for discharge and follow-up with primary care provider for further evaluation care.    I have reviewed the nursing notes. I have reviewed the findings, diagnosis, plan and need for follow up with the patient.    New Prescriptions    No medications on file       Final diagnoses:   Dizziness   PAF (paroxysmal atrial fibrillation) (H)   ICari, am serving as a trained medical scribe to document services personally performed by Randi Lopez MD, based on the provider's statements to me.     IRandi MD, was physically present and have reviewed and verified the accuracy of this note documented by Cari Nogueira.     Randi Lopez MD  [facility] EMERGENCY DEPARTMENT  7/2/2025     Randi Lopez MD  07/04/25 1945

## 2025-07-02 NOTE — ANESTHESIA POSTPROCEDURE EVALUATION
Patient: Randi Cleary    Procedure: * No procedures listed *  Electroconvulsive Therapy    Anesthesia Type:  General    Note:  Disposition: Outpatient   Postop Pain Control: Uneventful            Sign Out: Well controlled pain   PONV: No   Neuro/Psych: Uneventful            Sign Out: Acceptable/Baseline neuro status   Airway/Respiratory: Uneventful            Sign Out: Acceptable/Baseline resp. status   CV/Hemodynamics: Uneventful            Sign Out: Acceptable CV status; No obvious hypovolemia; No obvious fluid overload   Other NRE:    DID A NON-ROUTINE EVENT OCCUR?            Last vitals:  Vitals:    07/02/25 1055 07/02/25 1155   BP: 121/59    Pulse: 75 82   Resp: 16 18   Temp: 36.8  C (98.2  F) 36.3  C (97.4  F)   SpO2: 93%        Electronically Signed By: Carmen Saldana MD  July 2, 2025  12:10 PM

## 2025-07-02 NOTE — ANESTHESIA CARE TRANSFER NOTE
Patient: Randi Cleary    Procedure: * No procedures listed *  Electroconvulsive Therapy    Diagnosis: * No pre-op diagnosis entered *  Diagnosis Additional Information: No value filed.    Anesthesia Type:   General     Note:    Oropharynx: spontaneously breathing  Level of Consciousness: drowsy  Oxygen Supplementation: room air    Independent Airway: airway patency satisfactory and stable    Vital Signs Stable: post-procedure vital signs reviewed and stable  Report to RN Given: handoff report given  Patient transferred to: PACU    Handoff Report: Identifed the Patient, Identified the Reponsible Provider, Reviewed the pertinent medical history, Discussed the surgical course, Reviewed Intra-OP anesthesia mangement and issues during anesthesia, Set expectations for post-procedure period and Allowed opportunity for questions and acknowledgement of understanding      Vitals:  Vitals Value Taken Time   BP     Temp 36.3  C (97.4  F) 07/02/25 11:55   Pulse 82 07/02/25 11:55   Resp 18 07/02/25 11:55   SpO2         Electronically Signed By: Carmen Saldana MD  July 2, 2025  12:10 PM
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
## 2025-07-03 ENCOUNTER — TELEPHONE (OUTPATIENT)
Dept: CARDIOLOGY | Facility: CLINIC | Age: 70
End: 2025-07-03
Payer: MEDICARE

## 2025-07-03 NOTE — ED NOTES
Pt marked for discharge. Upon taking discharge vitals, BP 81/54. Rechecks 92/55 and 89/50. MD made aware, plan to do orthostatics

## 2025-07-03 NOTE — ED NOTES
Pt reports weakness and nausea after performing orthostatic BPs; denies dizziness. MD made aware.

## 2025-07-03 NOTE — TELEPHONE ENCOUNTER
Health Call Center    Phone Message    May a detailed message be left on voicemail: yes     Reason for Call: Other: Winnie called after sending a message to her team with [facility] regarding an afib episode she had. Informed Winnie Lincoln is no longer with [facility] but could schedule her with the next available EP RAKAN but the first appt is out to November and Winnie didn't want to schedule. Winnie wanted a message sent to her old team. Please review and reach out to Winnie to discuss. Thank you!     Action Taken: Other: Cardiology    Travel Screening: Not Applicable    Thank you!  [facility]  ]     Date of Service:

## 2025-07-05 ENCOUNTER — MYC REFILL (OUTPATIENT)
Dept: PALLIATIVE MEDICINE | Facility: OTHER | Age: 70
End: 2025-07-05
Payer: MEDICARE

## 2025-07-05 DIAGNOSIS — M19.071 OSTEOARTHRITIS OF RIGHT ANKLE AND FOOT: ICD-10-CM

## 2025-07-07 RX ORDER — OXYCODONE HYDROCHLORIDE 5 MG/1
5 TABLET ORAL
Qty: 70 TABLET | Refills: 0 | Status: SHIPPED | OUTPATIENT
Start: 2025-07-07

## 2025-07-07 NOTE — TELEPHONE ENCOUNTER
Received request for a refill(s) of     oxyCODONE (ROXICODONE) 5 MG tablet        Last dispensed from pharmacy on 6/14/2025    Patient's last office/virtual visit by prescribing provider on 6/9/2025  Next office/virtual appointment scheduled for 10/10/2025    Last urine drug screen date 6/9/2025  Current opioid agreement on file (completed within the last year) YesDate of opioid agreement: 6/11/2025    E-prescribe to [facility] [address] [place] [address] [id number] [facility]  pharmacy    Will route to [facility] for review and preparation of prescription(s).

## 2025-07-07 NOTE — PROGRESS NOTES
"Interventional Psychiatry Program   Treatment Resistant Depression (TRD) Group  [facility], [facility]      Patient: Randi Cleray (1955)     MRN: [id number]  Date of Treatment:  Jun 24, 2025  Duration of Treatment: Start Time: 11:00 am; End Time: 12:30pm (90 minute duration)  Providers/Group Facilitators: Lorena Cunningham, PhD, LP    Number of Participants: 5-10  Group Format: In Person    People present:   Providers:  Lorena Cunningham, PhD, LP  Patient: Randi Cleary  Others: Other patients    Encounter Diagnosis   Name Primary?    Severe episode of recurrent major depressive disorder, without psychotic features (H) Yes       Assessment (current symptoms):        5/13/2025     7:54 PM 6/16/2025     8:24 AM 6/30/2025     9:04 AM   PHQ   PHQ-9 Total Score 13  8  15    Q9: Thoughts of better off dead/self-harm past 2 weeks Not at all Not at all Not at all       Patient-reported         4/9/2025     8:50 AM 5/13/2025     7:56 PM 6/30/2025     9:06 AM   CHAVO-7 SCORE   Total Score 12 (moderate anxiety) 13 (moderate anxiety) 10 (moderate anxiety)   Total Score 12  13  10        Patient-reported         The treatment resistant depression (TRD) group is based on the dialectical behavioral therapy model that uses psychoeducation, and DBT skills that cover the following domains: mindfulness, distress tolerance, emotion regulation, interpersonal effectiveness       Inclusion criteria for group participation: Current patient in the [facility] TRD program; agreement to group format and guidelines discussed in first session.      Session 2 Content:   Introductions  Housekeeping review  Reviewed/discussed and agreed to Group Guidelines (e.g., respectful environment, mutual support)  Reviewed/discussed \"What do you hope to get from the group?\"  Introduced and discussed Distress Tolerance TIPP skills  Mindfulness Exercise + Debriefing  Introduced Between - " Session Assignments (individualized activities)  Wrap up      Description: Pt was an active participant and engaged in the discussion with other group members. Participant provided individual insights regarding potential use of TIPP skills, actively contributed to discussion of situations in which TIPP skills could prove useful.     Progress: Pt is making progress toward goals    Mental Status Exam:  Alertness: alert  and oriented  Appearance: adequately groomed  Behavior/Demeanor: cooperative and pleasant, with fair eye contact   Speech: normal  Language: intact. Preferred language identified as English.  Psychomotor: normal or unremarkable  Mood: depressed and anxious  Affect: flat; was congruent to mood; was congruent to content  Thought Process/Associations: unremarkable  Thought Content:  Reports none;  Denies suicidal ideation and violent ideation  -Suicidal ideation: denies SI, denies intent, and denies plan  -Homicidal Ideation: denies  Perception: Reports none;  Denies auditory hallucinations and visual hallucinations; intact    Insight: good  Judgment: good  Cognition: does  appear grossly intact      TRD Program Group Treatment Plan     Date of First Group Meetin2025    Diagnosis:  Encounter Diagnosis   Name Primary?    Severe episode of recurrent major depressive disorder, without psychotic features (H) Yes       Current symptoms and circumstances that substantiate the diagnosis:  Persistent, recurrent depressive mood symptoms [e.g., Persistent dysphoria, anhedonia, sleep disturbance, low energy/fatigue, trouble concentrating, and interfering anxiety] that interfere with daily functioning and reduced quality of life    Risk Assessment:  Suicide:  Assessed Level of Immediate Risk: Low  Ideation: None current  Plan:  Denies  Means: Denies  Intent: Denies     Homicide/Violence:  Assessed Level of Immediate Risk: Low  Ideation: Denies  Plan: n/a  Means: No  Intent: No          SYMPTOMS; PROBLEMS  MEASURABLE GOALS;    FUNCTIONAL IMPROVEMENT INTERVENTIONS Gains Made:   Persistent depression, anxiety, emotion dysregulation Improve functioning; maintain stability; improve coping with depression symptoms/minimize impact of depression as measured by PHQ-9 Dialectical behavioral therapy (DBT) group therapy including: mindfulness, distress tolerance, emotion regulation, and interpersonal effectiveness skills N/A Onset of treatment / pt expressed benefit from previous group therapy   Frequency of Sessions: Weekly   Expected duration of treatment: 8 sessions  Discharge and Aftercare Goals: after completion of 8 group meetings, patient will be discharged to current providers  Participants in therapy plan (family, friends, support network): self     Patient verbally consented to the treatment plan above.      Lorena Cunningham, PhD, LP

## 2025-07-07 NOTE — TELEPHONE ENCOUNTER
Refills have been requested for the following medications:         oxyCODONE (ROXICODONE) 5 MG tablet [AXEL WIGGINS]      Patient Comment: please change Rx to be filled at [facility]   [place], [place].  Thank you Winnie Morgan      Preferred pharmacy: [id number] - [place], [place] - [address]

## 2025-07-09 ENCOUNTER — VIRTUAL VISIT (OUTPATIENT)
Dept: BEHAVIORAL HEALTH | Facility: CLINIC | Age: 70
End: 2025-07-09
Payer: MEDICARE

## 2025-07-09 DIAGNOSIS — F41.1 GENERALIZED ANXIETY DISORDER: ICD-10-CM

## 2025-07-09 DIAGNOSIS — F33.2 SEVERE RECURRENT MAJOR DEPRESSION WITHOUT PSYCHOTIC FEATURES (H): Primary | ICD-10-CM

## 2025-07-09 PROCEDURE — 90837 PSYTX W PT 60 MINUTES: CPT | Mod: 95 | Performed by: SOCIAL WORKER

## 2025-07-09 NOTE — PROGRESS NOTES
[facility]                                     Progress Note    Patient Name: Randi Cleary  Date: 2025              Service Type: Individual      Session Start Time:  9:00am  Session End Time: 9:54am     Session Length:  54 minutes    Session #: 1 (with this provider)    Attendees: Client    Service Modality:  Video Visit:      Provider verified identity through the following two step process.  Patient provided:  Patient , Patient address, and Patient was verified at admission/transfer    Telemedicine Visit: The patient's condition can be safely assessed and treated via synchronous audio and visual telemedicine encounter.      Reason for Telemedicine Visit: Patient has requested telehealth visit    Originating Site (Patient Location): Patient's home    Distant Site (Provider Location): Provider Remote Setting- Home Office    Consent:  The patient/guardian has verbally consented to: the potential risks and benefits of telemedicine (video visit) versus in person care; bill my insurance or make self-payment for services provided; and responsibility for payment of non-covered services.     Patient would like the video invitation sent by:  My Chart    Mode of Communication:  Video Conference via [facility]    Distant Location (Provider):  Off-site    As the provider I attest to compliance with applicable laws and regulations related to telemedicine.    DATA  Extended Session (53+ minutes): PROLONGED SERVICE IN THE OUTPATIENT SETTING REQUIRING DIRECT (FACE-TO-FACE) PATIENT CONTACT BEYOND THE USUAL SERVICE:    - Treatment protocol required additional time to complete, due to the nature of diagnosis being treated and this being the first visit with new provider.    See Interventions section for details  Interactive Complexity: No  Crisis: No     Progress Since Last Session (Related to Symptoms / Goals / Homework):   Symptoms: Improving      Homework: Partially completed      Episode of Care  "Goals: Satisfactory progress - ACTION (Actively working towards change); Intervened by reinforcing change plan / affirming steps taken     Current / Ongoing Stressors and Concerns:   She introduces her cat today - emotional support animal - very important to her.  Patient shares that her memory bothers her due to the ECT. Patient reviews parts of her medical and mental health history - \"years of trying to regain my life.\" She shares about ECT last week resulting in AFIB and an ED visit - it's been a difficult week of recovery after this experience. Will be participating in a TRD group on Tuesdays.     Treatment Objective(s) Addressed in This Session:   Distress tolerance  Build rapport  Review treatment goals     Intervention:   Therapist and patient worked on building rapport, as patient is transitioning from previous therapist. Therapist provided unconditional positive regard and supportive counseling while helping patient identify treatment goals.     Assessments completed prior to visit:  The following assessments were completed by patient for this visit:  PHQ9:       3/5/2025    11:44 AM 3/11/2025     8:36 PM 4/18/2025    10:11 AM 4/21/2025    10:36 AM 5/13/2025     7:54 PM 6/16/2025     8:24 AM 6/30/2025     9:04 AM   PHQ-9 SCORE   PHQ-9 Total Score MyChart 15 (Moderately severe depression) 14 (Moderate depression) 16 (Moderately severe depression) 16 (Moderately severe depression) 13 (Moderate depression) 8 (Mild depression) 15 (Moderately severe depression)   PHQ-9 Total Score 15  14  16  16  13  8  15        Patient-reported     GAD7:       12/17/2024     9:13 AM 1/23/2025     8:47 AM 2/10/2025    10:22 PM 3/3/2025    10:45 AM 4/9/2025     8:50 AM 5/13/2025     7:56 PM 6/30/2025     9:06 AM   CHAVO-7 SCORE   Total Score 13 (moderate anxiety) 11 (moderate anxiety) 14 (moderate anxiety) 16 (severe anxiety) 12 (moderate anxiety) 13 (moderate anxiety) 10 (moderate anxiety)   Total Score 13  11  14  16  12  13  10 "        Patient-reported     CAGE-AID:       6/22/2020     7:12 PM 1/18/2022    11:15 PM 6/6/2024     8:00 AM   CAGE-AID Total Score   Total Score 4 4 4   Total Score MyChart 4 (A total score of 2 or greater is considered clinically significant) 4 (A total score of 2 or greater is considered clinically significant)      [place] Suicide Severity Rating Scale (Lifetime/Recent)      5/21/2024     4:49 PM 5/21/2024     9:00 PM 6/6/2024     8:00 AM 7/10/2024    12:00 PM 11/22/2024    11:05 AM 2/26/2025     5:59 PM 7/2/2025    12:45 PM   [place] Suicide Severity Rating (Lifetime/Recent)   Q1 Wish to be Dead (Lifetime)  Yes        Comments  OD on medications        Q2 Non-Specific Active Suicidal Thoughts (Lifetime)  No        Most Severe Ideation Rating (Lifetime)  5        Most Severe Ideation Description (Lifetime)  OD on medication        Frequency (Lifetime)  3        Duration (Lifetime)  3        Controllability (Lifetime)  2        Protective Factors  (Lifetime)  4        Reasons for Ideation (Lifetime)  5        Q1 Wished to be Dead (Past Month) 1-->yes 1-->yes 1-->yes  0-->no 0-->no 0-->no   Q2 Suicidal Thoughts (Past Month) 1-->yes 1-->yes 1-->yes  0-->no 0-->no 0-->no   Q3 Suicidal Thought Method 0-->no 0-->no 1-->yes       Q4 Suicidal Intent without Specific Plan 1-->yes 0-->no 0-->no       Q5 Suicide Intent with Specific Plan 0-->no 0-->no 1-->yes       Q6 Suicide Behavior (Lifetime) 1-->yes 0-->no 1-->yes  0-->no 0-->no 0-->no   If yes to Q6, within past 3 months? 1-->yes  0-->no  0-->no        Level of Risk per Screen high risk  moderate risk  high risk   no risks indicated  no risks indicated  no risks indicated   1. Wish to be Dead (Lifetime)    N      2. Non-Specific Active Suicidal Thoughts (Lifetime)    N          Data saved with a previous flowsheet row definition         ASSESSMENT: Current Emotional / Mental Status (status of significant symptoms):   Risk status (Self / Other harm or suicidal  ideation)   Patient denies current fears or concerns for personal safety.   Patient denies current or recent suicidal ideation or behaviors.   Patient denies current or recent homicidal ideation or behaviors.   Patient denies current or recent self injurious behavior or ideation.   Patient denies other safety concerns.   Patient reports there has been no change in risk factors since their last session.     Patient reports there has been no change in protective factors since their last session.     A safety and risk management plan has been developed including: Patient consented to co-developed safety plan on 6/4/24.  Safety and risk management plan was reviewed.   Patient agreed to use safety plan should any safety concerns arise.  A copy was made available to the patient.     Appearance:   Appropriate    Eye Contact:   Good    Psychomotor Behavior: Normal    Attitude:   Cooperative  Friendly   Orientation:   All   Speech    Rate / Production: Talkative    Volume:  Normal    Mood:    Normal   Affect:    Appropriate  Bright    Thought Content:  Clear    Thought Form:  Goal Directed    Insight:    Good      Medication Review:   No changes to current psychiatric medication(s)     Medication Compliance:   Yes     Changes in Health Issues:   None reported     Chemical Use Review:   Substance Use: Chemical use reviewed, no active concerns identified      Tobacco Use: No current tobacco use.      Diagnosis:  1. Severe recurrent major depression without psychotic features (H)    2. Generalized anxiety disorder        Collateral Reports Completed:   Not Applicable    PLAN: (Patient Tasks / Therapist Tasks / Other)  Patient encouraged to continue following safety plan.    Patient will return for a visit in 1 week    ASHKAN Castillo                                                         ______________________________________________________________________    Individual Treatment Plan    Patient's Name: Randi NICOLE Morgan  Evin  YOB: 1955    Date of Creation: 7/17/2024  Date Treatment Plan Last Reviewed/Revised: 7/9/2025     DSM5 Diagnoses:   296.33 (F33.2) Major Depressive Disorder, Recurrent Episode, Severe   300.02 (F41.1) Generalized Anxiety Disorder  Psychosocial / Contextual Factors: history of trauma  PROMIS (reviewed every 90 days):   PROMIS 10-Global Health (only subscores and total score):       12/4/2024     9:28 AM 12/18/2024     9:10 AM 12/30/2024     5:44 PM 4/1/2025    11:00 AM 4/9/2025     9:00 AM 4/21/2025    10:39 AM 7/9/2025     8:51 AM   PROMIS-10 Scores Only   Global Mental Health Score 6  7  5  7  6  6  8    Global Physical Health Score 11  10  10  10  10  9  12    PROMIS TOTAL - SUBSCORES 17  17  15  17  16  15  20        Patient-reported       Referral / Collaboration:  Discussed and patient will pursue a therapy group for depressive symptoms.    Anticipated number of session for this episode of care: 9-12 sessions  Anticipation frequency of session: Weekly  Anticipated Duration of each session: 38-52 minutes  Treatment plan will be reviewed in 90 days or when goals have been changed.       MeasurableTreatment Goal(s) related to diagnosis / functional impairment(s)  Goal 1: Client will keep self safe and eliminate suicidal ideation and self-harm behaviors.    Objective #A (Client Action)    Client will make a list of at least 10 skills or activities that you will to use to distract from urges to harm self.  Status: Completed - Date: 10/30/24      Intervention(s)  Therapist will provide ideas and strategies for generating coping skills list.    Objective #B  Client will make a list of pros and cons for tolerating and not tolerating an urge to harm self.  Status: Continued - Date(s): 7/9/2025     Intervention(s)  Therapist will guide client through DBT-style Pros/Cons list for behavior change.    Objective #C  Client will identify and practice the new strategies for dealing with strong emotions,  learn and practice relaxation breathing.  Status: Continued - Date(s): 7/09/25      Intervention(s)  Therapist will teach distraction skills. Practice them within session and outside of session.    Goal 2: Client will report reduction in anxiety also as evidenced by reduction of CHAVO-7 score below 6 points within the next 12 weeks.    Objective #A (Client Action)    Client will identify at least three triggers for anxiety.  Status: Completed - Date: 10/30/24      Intervention(s)  Therapist will provide educational materials on common triggers and signs of anxiety.    Objective #B  Client will use relaxation strategies at least two times per day to reduce the physical symptoms of anxiety.  Status: Continued - Date(s): 7/9/2025     Intervention(s)  Therapist will teach relaxation strategies such as mindfulness, deep breathing, muscle relaxation, and sensory activities.    Objective #C  Client will use cognitive strategies identified in therapy to challenge anxious thoughts.  Status: Continued - Date(s): 7/9/2025     Intervention(s)  Therapist will teach strategies for cognitive modification using REBT model.    Goal 3: Client will report improved mood as indicated by PHQ-9 score below 6 for consistent 8 weeks.    Objective #A (Client Action)    Status: Continued - Date: 7/9/2025     Client will Increase interest, engagement, and pleasure in doing things.    Intervention(s)  Therapist will assign homework for daily involvement in pleasant activities.    Objective #B  Client will Identify negative self-talk and behaviors: challenge core beliefs, myths, and actions.    Status: Continued - Date(s): 7/9/2025     Intervention(s)  Therapist will teach strategies for cognitive modification using REBT models.    Objective #C  Client will Improve quantity and quality of night time sleep / decrease daytime naps.  Status: Continued - Date(s): 7/9/2025     Intervention(s)  Therapist will teach sleep hygiene strategies.  Assign  homework for daily practice.    Goal 4: Client will report reduced or eliminated physiological activation when recalling a distressing event including decreased re-experiencing, decreased avoidance, and decreased hyperarousal.    Objective #A (Client Action)    Status: Continued: 7/9/2025      Client will acquire knowledge of trauma, common symptoms post-trauma, emotion regulation strategies, stress management strategies, and cognitive coping strategies.    Intervention(s)  Therapist will teach about effects of trauma on mind and body; encourage emotion identification and expression; practice with client the stress management strategies; and teach cognitive modification.    Objective #B  Client will use Brainspotting while focusing on physiological sensations related to distressing events.  Client will assess and rate level of physiological activation.  Status: Continued - Date(s): 7/9/2025     Intervention(s)  Therapist will provide use a pointer or other materials to assist client in holding a brainspot in their visual field.  Therapist might also provide biolateral music through headphones to deepen the experience.         Patient has reviewed and agreed to the above plan.      Mimi Orantes, [facility]  July 9, 2025

## 2025-07-10 ENCOUNTER — PATIENT OUTREACH (OUTPATIENT)
Dept: CARE COORDINATION | Facility: CLINIC | Age: 70
End: 2025-07-10

## 2025-07-10 NOTE — PROGRESS NOTES
"Interventional Psychiatry Program   Treatment Resistant Depression (TRD) Group  [facility], [facility]      Patient: Randi Cleary (1955)     MRN: [id number]  Date of Treatment:  Jul 1, 2025  Duration of Treatment: Start Time: 11:00 am; End Time: 12:30pm (90 minute duration)  Providers/Group Facilitators: Lorena Cunningham, PhD, LP    Number of Participants: 5  Group Format: In Person    People present:   Providers:  Lorena Cunningham, PhD, LP  Patient: Randi Cleary  Others: Other patients    Encounter Diagnoses   Name Primary?    Severe episode of recurrent major depressive disorder, without psychotic features (H) Yes    Generalized anxiety disorder        Assessment (current symptoms):        5/13/2025     7:54 PM 6/16/2025     8:24 AM 6/30/2025     9:04 AM   PHQ   PHQ-9 Total Score 13  8  15    Q9: Thoughts of better off dead/self-harm past 2 weeks Not at all Not at all Not at all       Patient-reported         4/9/2025     8:50 AM 5/13/2025     7:56 PM 6/30/2025     9:06 AM   CHAVO-7 SCORE   Total Score 12 (moderate anxiety) 13 (moderate anxiety) 10 (moderate anxiety)   Total Score 12  13  10        Patient-reported         The treatment resistant depression (TRD) group is based on the dialectical behavioral therapy model that uses psychoeducation, and DBT skills that cover the following domains: mindfulness, distress tolerance, emotion regulation, interpersonal effectiveness       Inclusion criteria for group participation: Current patient in the [facility] TRD program; agreement to group format and guidelines discussed in first session.      Session 3 Content:   Introductions  Housekeeping review  Reviewed/discussed and agreed to Group Guidelines (e.g., respectful environment, mutual support)  Reviewed/discussed \"What do you hope to get from the group?\"  Introduced and discussed Distress Tolerance ACCEPTS skills (strategies for distracting " oneself from distressing emotions, giving them time to lessen in intensity)  Debriefing  Introduced Between - Session Assignments (individualized activities)  Wrap up      Description: Pt was an active participant and engaged in the discussion with other group members. Participant provided individual insights regarding potential use of ACCEPTS skills, actively contributed to discussion of situations in which TIPP skills could prove useful.    Progress: Pt is making progress toward goals    Mental Status Exam:  Alertness: alert  and oriented  Appearance: adequately groomed  Behavior/Demeanor: cooperative and pleasant, with fair eye contact   Speech: normal  Language: intact. Preferred language identified as English.  Psychomotor: normal or unremarkable  Mood: depressed and anxious  Affect: flat; was congruent to mood; was congruent to content  Thought Process/Associations: unremarkable  Thought Content:  Reports none;  Denies suicidal ideation and violent ideation  -Suicidal ideation: denies SI, denies intent, and denies plan  -Homicidal Ideation: denies  Perception: Reports none;  Denies auditory hallucinations and visual hallucinations; intact    Insight: good  Judgment: good  Cognition: does  appear grossly intact      TRD Program Group Treatment Plan     Date of First Group Meetin2025    Diagnosis:  Encounter Diagnoses   Name Primary?    Severe episode of recurrent major depressive disorder, without psychotic features (H) Yes    Generalized anxiety disorder        Current symptoms and circumstances that substantiate the diagnosis:  Persistent, recurrent depressive mood symptoms [e.g., Persistent dysphoria, anhedonia, sleep disturbance, low energy/fatigue, trouble concentrating, and interfering anxiety] that interfere with daily functioning and reduced quality of life    Risk Assessment:  Suicide:  Assessed Level of Immediate Risk: Low  Ideation: None current  Plan:  Denies  Means: Denies  Intent:  Denies     Homicide/Violence:  Assessed Level of Immediate Risk: Low  Ideation: Denies  Plan: n/a  Means: No  Intent: No          SYMPTOMS; PROBLEMS MEASURABLE GOALS;    FUNCTIONAL IMPROVEMENT INTERVENTIONS Gains Made:   Persistent depression, anxiety, emotion dysregulation Improve functioning; maintain stability; improve coping with depression symptoms/minimize impact of depression as measured by PHQ-9 Dialectical behavioral therapy (DBT) group therapy including: mindfulness, distress tolerance, emotion regulation, and interpersonal effectiveness skills N/A Onset of treatment / pt expressed benefit from previous group therapy   Frequency of Sessions: Weekly   Expected duration of treatment: 8 sessions  Discharge and Aftercare Goals: after completion of 8 group meetings, patient will be discharged to current providers  Participants in therapy plan (family, friends, support network): self     Patient verbally consented to the treatment plan above.      Lorena Cunningham, PhD, LP

## 2025-07-10 NOTE — PROGRESS NOTES
"Follow Up Progress Note      Assessment: [facility] RN spoke with patient today to follow up on goal and to discuss any needs or concerns. Patient reported experiencing an episode of A fib after a recent ECT treatment. She reported she has follow up up with Cardiology on 8/5/25 and has a follow up with her PCP on 7/14/25 to discuss this concern. Patient reported ECT has been the best treatment she has had for her depression, so she hopes she will be able continue these treatment. Patient currently working with DBT group. She continues to see her therapist and psychiatric provider on a regular basis. Patient stated she was feeling a little \"blue\" today. She attributes this to having to see so many specialist and having to cancel her shoulder surgery related to her cardiology concerns. She denied any suicidal ideation. Patient stated she was grateful for the call today.     Care Gaps:    Health Maintenance Due   Topic Date Due    HF ACTION PLAN  Never done    DIABETIC FOOT EXAM  Never done    COPD ACTION PLAN  Never done    HEPATITIS A VACCINE (1 of 2 - Risk 2-dose series) Never done    ZOSTER VACCINE (1 of 2) Never done    MEDICARE ANNUAL WELLNESS VISIT  Never done    COLORECTAL CANCER SCREENING  02/25/2024    COVID-19 VACCINE (9 - 2024-25 season) 04/09/2025    DEPRESSION 6 MO INDEX REPEAT PHQ-9  07/05/2025    LIPID  08/06/2025    MICROALBUMIN  08/06/2025       Scheduled with PCP on 7/14/25      Care Plans  Care Plan: Mental Health       Problem: Mental Health Symptoms Need Improvement       Long-Range Goal: I would like to feel as though my mental health has improved.       Start Date: 10/16/2024 Expected End Date: 10/15/2025    Recent Progress: 60%    Priority: High    Note:     Barriers: history of anxiety, bipolar disorder, trauma related disorder  Strengths: strong advocate for herself  Patient expressed understanding of goal: yes  Action steps to achieve this goal:  1. I will continue to attend all appointments with " my therapist, with my psychiatric provider and attend all ECT treatments.   2. I continue to attend my group therapy appointments.   3. I will continue to the use the skills I have learned through therapy, partial hospitalization program and therapy group.   4. I will take my medications as directed.   5. I will report progress towards this goal at schedule outreach telephone calls from my Care Coordinator.     Discussed on 7/10/25                              Intervention/Education provided during outreach: Discussed the importance of taking her medications as directed. Encouraged her to attend all upcoming appointments. Encouraged her to contact Care Coordination for any additional needs or concerns.      Outreach Frequency: monthly, more frequently as needed    Plan: CCC RN will continue to monitor, support patient with current goal and will be available to assist as nursing needs arise.     Care Coordinator will follow up in one month.

## 2025-07-14 ENCOUNTER — VIRTUAL VISIT (OUTPATIENT)
Dept: BEHAVIORAL HEALTH | Facility: CLINIC | Age: 70
End: 2025-07-14
Payer: MEDICARE

## 2025-07-14 ENCOUNTER — OFFICE VISIT (OUTPATIENT)
Dept: INTERNAL MEDICINE | Facility: CLINIC | Age: 70
End: 2025-07-14
Payer: MEDICARE

## 2025-07-14 VITALS
RESPIRATION RATE: 18 BRPM | SYSTOLIC BLOOD PRESSURE: 117 MMHG | HEIGHT: 66 IN | TEMPERATURE: 97.6 F | HEART RATE: 50 BPM | DIASTOLIC BLOOD PRESSURE: 69 MMHG | OXYGEN SATURATION: 97 % | WEIGHT: 182 LBS | BODY MASS INDEX: 29.25 KG/M2

## 2025-07-14 DIAGNOSIS — Z87.891 PERSONAL HISTORY OF TOBACCO USE: ICD-10-CM

## 2025-07-14 DIAGNOSIS — I48.0 PAF (PAROXYSMAL ATRIAL FIBRILLATION) (H): Primary | ICD-10-CM

## 2025-07-14 DIAGNOSIS — F41.1 GENERALIZED ANXIETY DISORDER: ICD-10-CM

## 2025-07-14 DIAGNOSIS — F33.2 SEVERE RECURRENT MAJOR DEPRESSION WITHOUT PSYCHOTIC FEATURES (H): Primary | ICD-10-CM

## 2025-07-14 PROCEDURE — G2211 COMPLEX E/M VISIT ADD ON: HCPCS | Performed by: NURSE PRACTITIONER

## 2025-07-14 PROCEDURE — 93005 ELECTROCARDIOGRAM TRACING: CPT | Performed by: NURSE PRACTITIONER

## 2025-07-14 PROCEDURE — 99214 OFFICE O/P EST MOD 30 MIN: CPT | Performed by: NURSE PRACTITIONER

## 2025-07-14 PROCEDURE — 3078F DIAST BP <80 MM HG: CPT | Performed by: NURSE PRACTITIONER

## 2025-07-14 PROCEDURE — 93010 ELECTROCARDIOGRAM REPORT: CPT | Mod: OFF | Performed by: STUDENT IN AN ORGANIZED HEALTH CARE EDUCATION/TRAINING PROGRAM

## 2025-07-14 PROCEDURE — 90837 PSYTX W PT 60 MINUTES: CPT | Mod: 95 | Performed by: SOCIAL WORKER

## 2025-07-14 PROCEDURE — G0296 VISIT TO DETERM LDCT ELIG: HCPCS | Performed by: NURSE PRACTITIONER

## 2025-07-14 PROCEDURE — 3074F SYST BP LT 130 MM HG: CPT | Performed by: NURSE PRACTITIONER

## 2025-07-14 NOTE — PATIENT INSTRUCTIONS
Lung cancer screening test ordered.  Someone will be calling to set that up.    You are still in A-fib.    We need to repeat the Zio patch to understand your heart rate and rhythm a bit better.    Lab work from ED visit was unremarkable.    Keep your appointment with cardiology for August.    Based on your [id number] score, you may benefit from using a blood thinner to reduce stroke risk but it is not absolutely indicated at this time.

## 2025-07-14 NOTE — PROGRESS NOTES
[facility]                                     Progress Note    Patient Name: Randi Cleary  Date: 2025              Service Type: Individual      Session Start Time:  10:00am  Session End Time: 10:54am     Session Length:  54 minutes    Session #: 2    Attendees: Client    Service Modality:  Video Visit:      Provider verified identity through the following two step process.  Patient provided:  Patient , Patient address, and Patient was verified at admission/transfer    Telemedicine Visit: The patient's condition can be safely assessed and treated via synchronous audio and visual telemedicine encounter.      Reason for Telemedicine Visit: Patient has requested telehealth visit    Originating Site (Patient Location): Patient's home    Distant Site (Provider Location): [facility] [place]    Consent:  The patient/guardian has verbally consented to: the potential risks and benefits of telemedicine (video visit) versus in person care; bill my insurance or make self-payment for services provided; and responsibility for payment of non-covered services.     Patient would like the video invitation sent by:  My Chart    Mode of Communication:  Video Conference via [facility]    Distant Location (Provider):  On-site    As the provider I attest to compliance with applicable laws and regulations related to telemedicine.    DATA  Extended Session (53+ minutes): PROLONGED SERVICE IN THE OUTPATIENT SETTING REQUIRING DIRECT (FACE-TO-FACE) PATIENT CONTACT BEYOND THE USUAL SERVICE:    - Treatment protocol required additional time to complete, due to the nature of diagnosis being treated. Patient benefits from additional session time to process thoughts and feelings while practicing learned coping strategies.   See Interventions section for details  Interactive Complexity: No  Crisis: No     Progress Since Last Session (Related to Symptoms / Goals / Homework):   Symptoms: No change  anxiety and depression    Homework: Partially completed      Episode of Care Goals: Satisfactory progress - ACTION (Actively working towards change); Intervened by reinforcing change plan / affirming steps taken     Current / Ongoing Stressors and Concerns:   Patient shares about a recent upsetting incident with potential spam and creating several days of conflict and chaos. She experiences significant pain and discomfort in her body. She has been going through a lot these past few years - does not feel that she is getting any better.     Will be participating in a TRD group on Tuesdays starting tomorrow.     Treatment Objective(s) Addressed in This Session:   Distress tolerance  Build rapport  Review treatment goals     Intervention:   Therapist and patient worked on building rapport, as patient is transitioning from previous therapist. Therapist provided unconditional positive regard and supportive counseling. Therapist reviewed grounding techniques for managing distress.    Assessments completed prior to visit:  The following assessments were completed by patient for this visit:  PHQ9:       3/11/2025     8:36 PM 4/18/2025    10:11 AM 4/21/2025    10:36 AM 5/13/2025     7:54 PM 6/16/2025     8:24 AM 6/30/2025     9:04 AM 7/14/2025     9:06 AM   PHQ-9 SCORE   PHQ-9 Total Score MyChart 14 (Moderate depression) 16 (Moderately severe depression) 16 (Moderately severe depression) 13 (Moderate depression) 8 (Mild depression) 15 (Moderately severe depression) 15 (Moderately severe depression)   PHQ-9 Total Score 14  16  16  13  8  15  15        Patient-reported     GAD7:       1/23/2025     8:47 AM 2/10/2025    10:22 PM 3/3/2025    10:45 AM 4/9/2025     8:50 AM 5/13/2025     7:56 PM 6/30/2025     9:06 AM 7/14/2025     9:07 AM   CHAVO-7 SCORE   Total Score 11 (moderate anxiety) 14 (moderate anxiety) 16 (severe anxiety) 12 (moderate anxiety) 13 (moderate anxiety) 10 (moderate anxiety) 16 (severe anxiety)   Total Score 11   14  16  12  13  10  16        Patient-reported     CAGE-AID:       6/22/2020     7:12 PM 1/18/2022    11:15 PM 6/6/2024     8:00 AM   CAGE-AID Total Score   Total Score 4 4 4   Total Score MyChart 4 (A total score of 2 or greater is considered clinically significant) 4 (A total score of 2 or greater is considered clinically significant)      [place] Suicide Severity Rating Scale (Lifetime/Recent)      5/21/2024     4:49 PM 5/21/2024     9:00 PM 6/6/2024     8:00 AM 7/10/2024    12:00 PM 11/22/2024    11:05 AM 2/26/2025     5:59 PM 7/2/2025    12:45 PM   [place] Suicide Severity Rating (Lifetime/Recent)   Q1 Wish to be Dead (Lifetime)  Yes        Comments  OD on medications        Q2 Non-Specific Active Suicidal Thoughts (Lifetime)  No        Most Severe Ideation Rating (Lifetime)  5        Most Severe Ideation Description (Lifetime)  OD on medication        Frequency (Lifetime)  3        Duration (Lifetime)  3        Controllability (Lifetime)  2        Protective Factors  (Lifetime)  4        Reasons for Ideation (Lifetime)  5        Q1 Wished to be Dead (Past Month) 1-->yes 1-->yes 1-->yes  0-->no 0-->no 0-->no   Q2 Suicidal Thoughts (Past Month) 1-->yes 1-->yes 1-->yes  0-->no 0-->no 0-->no   Q3 Suicidal Thought Method 0-->no 0-->no 1-->yes       Q4 Suicidal Intent without Specific Plan 1-->yes 0-->no 0-->no       Q5 Suicide Intent with Specific Plan 0-->no 0-->no 1-->yes       Q6 Suicide Behavior (Lifetime) 1-->yes 0-->no 1-->yes  0-->no 0-->no 0-->no   If yes to Q6, within past 3 months? 1-->yes  0-->no  0-->no        Level of Risk per Screen high risk  moderate risk  high risk   no risks indicated  no risks indicated  no risks indicated   1. Wish to be Dead (Lifetime)    N      2. Non-Specific Active Suicidal Thoughts (Lifetime)    N          Data saved with a previous flowsheet row definition         ASSESSMENT: Current Emotional / Mental Status (status of significant symptoms):   Risk status (Self / Other  harm or suicidal ideation)   Patient denies current fears or concerns for personal safety.   Patient denies current or recent suicidal ideation or behaviors.   Patient denies current or recent homicidal ideation or behaviors.   Patient denies current or recent self injurious behavior or ideation.   Patient denies other safety concerns.   Patient reports there has been no change in risk factors since their last session.     Patient reports there has been no change in protective factors since their last session.     A safety and risk management plan has been developed including: Patient consented to co-developed safety plan on 6/4/24.  Safety and risk management plan was reviewed.   Patient agreed to use safety plan should any safety concerns arise.  A copy was made available to the patient.     Appearance:   Appropriate    Eye Contact:   Good    Psychomotor Behavior: Normal    Attitude:   Cooperative  Friendly   Orientation:   All   Speech    Rate / Production: Talkative    Volume:  Normal    Mood:    Anxious  Normal Sad    Affect:    Appropriate  Bright  Tearful Worrisome    Thought Content:  Clear    Thought Form:  Goal Directed    Insight:    Good      Medication Review:   No changes to current psychiatric medication(s)     Medication Compliance:   Yes     Changes in Health Issues:   None reported     Chemical Use Review:   Substance Use: Chemical use reviewed, no active concerns identified      Tobacco Use: No current tobacco use.      Diagnosis:  1. Severe recurrent major depression without psychotic features (H)    2. Generalized anxiety disorder        Collateral Reports Completed:   Not Applicable    PLAN: (Patient Tasks / Therapist Tasks / Other)  Patient encouraged to continue following safety plan.    Patient will return for a visit in 1 week    ASHKAN Castillo                                                          ______________________________________________________________________    Individual Treatment Plan    Patient's Name: Randi Cleary  YOB: 1955    Date of Creation: 7/17/2024  Date Treatment Plan Last Reviewed/Revised: 7/9/2025     DSM5 Diagnoses:   296.33 (F33.2) Major Depressive Disorder, Recurrent Episode, Severe   300.02 (F41.1) Generalized Anxiety Disorder  Psychosocial / Contextual Factors: history of trauma  PROMIS (reviewed every 90 days):   PROMIS 10-Global Health (only subscores and total score):       12/4/2024     9:28 AM 12/18/2024     9:10 AM 12/30/2024     5:44 PM 4/1/2025    11:00 AM 4/9/2025     9:00 AM 4/21/2025    10:39 AM 7/9/2025     8:51 AM   PROMIS-10 Scores Only   Global Mental Health Score 6  7  5  7  6  6  8    Global Physical Health Score 11  10  10  10  10  9  12    PROMIS TOTAL - SUBSCORES 17  17  15  17  16  15  20        Patient-reported       Referral / Collaboration:  Discussed and patient will pursue a therapy group for depressive symptoms.    Anticipated number of session for this episode of care: 9-12 sessions  Anticipation frequency of session: Weekly  Anticipated Duration of each session: 38-52 minutes  Treatment plan will be reviewed in 90 days or when goals have been changed.       MeasurableTreatment Goal(s) related to diagnosis / functional impairment(s)  Goal 1: Client will keep self safe and eliminate suicidal ideation and self-harm behaviors.    Objective #A (Client Action)    Client will make a list of at least 10 skills or activities that you will to use to distract from urges to harm self.  Status: Completed - Date: 10/30/24      Intervention(s)  Therapist will provide ideas and strategies for generating coping skills list.    Objective #B  Client will make a list of pros and cons for tolerating and not tolerating an urge to harm self.  Status: Continued - Date(s): 7/9/2025     Intervention(s)  Therapist will guide client through DBT-style  Pros/Cons list for behavior change.    Objective #C  Client will identify and practice the new strategies for dealing with strong emotions, learn and practice relaxation breathing.  Status: Continued - Date(s): 7/14/25      Intervention(s)  Therapist will teach distraction skills. Practice them within session and outside of session.    Goal 2: Client will report reduction in anxiety also as evidenced by reduction of CHAVO-7 score below 6 points within the next 12 weeks.    Objective #A (Client Action)    Client will identify at least three triggers for anxiety.  Status: Completed - Date: 10/30/24      Intervention(s)  Therapist will provide educational materials on common triggers and signs of anxiety.    Objective #B  Client will use relaxation strategies at least two times per day to reduce the physical symptoms of anxiety.  Status: Continued - Date(s): 7/9/2025     Intervention(s)  Therapist will teach relaxation strategies such as mindfulness, deep breathing, muscle relaxation, and sensory activities.    Objective #C  Client will use cognitive strategies identified in therapy to challenge anxious thoughts.  Status: Continued - Date(s): 7/9/2025     Intervention(s)  Therapist will teach strategies for cognitive modification using REBT model.    Goal 3: Client will report improved mood as indicated by PHQ-9 score below 6 for consistent 8 weeks.    Objective #A (Client Action)    Status: Continued - Date: 7/9/2025     Client will Increase interest, engagement, and pleasure in doing things.    Intervention(s)  Therapist will assign homework for daily involvement in pleasant activities.    Objective #B  Client will Identify negative self-talk and behaviors: challenge core beliefs, myths, and actions.    Status: Continued - Date(s): 7/9/2025     Intervention(s)  Therapist will teach strategies for cognitive modification using REBT models.    Objective #C  Client will Improve quantity and quality of night time sleep /  decrease daytime naps.  Status: Continued - Date(s): 7/9/2025     Intervention(s)  Therapist will teach sleep hygiene strategies.  Assign homework for daily practice.    Goal 4: Client will report reduced or eliminated physiological activation when recalling a distressing event including decreased re-experiencing, decreased avoidance, and decreased hyperarousal.    Objective #A (Client Action)    Status: Continued: 7/9/2025      Client will acquire knowledge of trauma, common symptoms post-trauma, emotion regulation strategies, stress management strategies, and cognitive coping strategies.    Intervention(s)  Therapist will teach about effects of trauma on mind and body; encourage emotion identification and expression; practice with client the stress management strategies; and teach cognitive modification.    Objective #B  Client will use Brainspotting while focusing on physiological sensations related to distressing events.  Client will assess and rate level of physiological activation.  Status: Continued - Date(s): 7/9/2025     Intervention(s)  Therapist will provide use a pointer or other materials to assist client in holding a brainspot in their visual field.  Therapist might also provide biolateral music through headphones to deepen the experience.         Patient has reviewed and agreed to the above plan.      Mimi Orantes, [facility]  July 9, 2025

## 2025-07-14 NOTE — PROGRESS NOTES
Lung Cancer Screening Shared Decision Making Visit     Randi Cleary, a 69 year old female, is eligible for lung cancer screening    History   Smoking Status     Former     Types: Cigarettes   Smokeless Tobacco     Never       I have discussed with patient the risks and benefits of screening for lung cancer with low-dose CT.     The risks include:    radiation exposure: one low dose chest CT has as much ionizing radiation as about 15 chest x-rays, or 6 months of background radiation living in [place]      false positives: most findings/nodules are NOT cancer, but some might still require additional diagnostic evaluation, including biopsy    over-diagnosis: some slow growing cancers that might never have been clinically significant will be detected and treated unnecessarily     The benefit of early detection of lung cancer is contingent upon adherence to annual screening or more frequent follow up if indicated.     Furthermore, to benefit from screening, Randi must be willing and able to undergo diagnostic procedures, if indicated. Although no specific guide is available for determining severity of comorbidities, it is reasonable to withhold screening in patients who have greater mortality risk from other diseases.     We did discuss that the best way to prevent lung cancer is to not smoke.    Some patients may value a numeric estimation of lung cancer risk when evaluating if lung cancer screening is right for them, here is one calculator:    ShouldIScreen

## 2025-07-14 NOTE — PROGRESS NOTES
Randi Cleary arrived here on 7/14/2025 4:50 PM for 8-14 Days  Zio monitor placement per ordering provider Faby for the diagnosis Afib.  Patient s skin was prepped per protocol. Dr. Hobbs is the supervising MD.  Zio monitor was placed.  Instructions were reviewed with and given to the patient.  Patient verbalized understanding of wear, troubleshooting and monitor return instructions.         Assessment & Plan     PAF (paroxysmal atrial fibrillation) (H)  She had ECT on 7/2.  After the procedure, she was noted to be in atrial fibrillation.    Seems to be similar situation to her episode this past November, some dehydration felt to contributing at that time and she tells me that she may have been a bit dehydrated this time as well.    She had lab workup in the ED, including troponin, TSH, magnesium, CMP within normal limits.    Echocardiogram this past November with unremarkable EF, no valvular abnormalities.    Today, we ordered another EKG for her and she remains in atrial fibrillation per my personal interpretation.    Ventricle rate slightly above 100 although she is a bit emotional today and frustrated at her situation.  Perhaps some mild dizziness at times but otherwise asymptomatic.    Her [id number] score is 2 (age and gender) and she has been adamant that she does not want to start blood thinners.  In the past, she has been prescribed Xarelto but has refused.    We will order another Zio patch monitor for her to characterize her heart rate and rhythm in more detail.  She does have a follow-up appointment with cardiology again next month.    We reviewed warning signs and symptoms for which she needs to seek immediate medical reevaluation.  She verbalized understanding with this plan    - EKG 12-lead, tracing only  - ZIO PATCH 8-14 DAYS (additional cost to patient)  - ZIO PATCH 8-14 DAYS APPLICATION    Personal history of tobacco use  She has a significant smoking history and meets criteria for lung  "cancer screening.  This was ordered for her today  - Prof fee: Shared Decision Making for Lung Cancer Screening  - CT Chest Lung Cancer Scrn Low Dose wo; Future    Patient Instructions   Lung cancer screening test ordered.  Someone will be calling to set that up.    You are still in A-fib.    We need to repeat the Zio patch to understand your heart rate and rhythm a bit better.    Lab work from ED visit was unremarkable.    Keep your appointment with cardiology for August.    Based on your [id number] score, you may benefit from using a blood thinner to reduce stroke risk but it is not absolutely indicated at this time.      The longitudinal plan of care for the diagnosis(es)/condition(s) as documented were addressed during this visit. Due to the added complexity in care, I will continue to support Winnie in the subsequent management and with ongoing continuity of care.    Subjective   Winnie is a 69 year old, presenting for the following health issues:  Follow Up (ER follow up. Afib, dizziness. )        7/14/2025     4:05 PM   Additional Questions   Roomed by Nydai SKINNER   Accompanied by madhu BRADLEY      ED follow-up.  Had a CT and flipped into atrial fibrillation again.  Perhaps some mild dizziness intermittently but this is not significant, otherwise feeling okay today.  Denies any chest pain.  No syncopal episodes.  Denies any shortness of breath, dyspnea on exertion, lower extremity edema, weight gain, CHF symptoms.  Still is not interested in anticoagulation.  Frustrated at the atrial fibrillation episode.  Frustrated that she still in A-fib.  Frustrated that she cannot get back in with Dr. Edwards      Objective    BP (!) 146/82   Pulse 50   Temp 97.6  F (36.4  C)   Resp 18   Ht 1.676 m (5' 6\")   Wt 82.6 kg (182 lb)   LMP  (LMP Unknown)   SpO2 97%   BMI 29.38 kg/m    Body mass index is 29.38 kg/m .  Physical Exam   Heart rate irregularly irregular.  Lungs clear to auscultation.  No evidence of CHF on " exam              Signed Electronically by: Kaushik Hobbs, SOL[facility]

## 2025-07-15 LAB
ATRIAL RATE - MUSE: 127 BPM
DIASTOLIC BLOOD PRESSURE - MUSE: NORMAL MMHG
INTERPRETATION ECG - MUSE: NORMAL
P AXIS - MUSE: NORMAL DEGREES
PR INTERVAL - MUSE: NORMAL MS
QRS DURATION - MUSE: 118 MS
QT - MUSE: 356 MS
QTC - MUSE: 468 MS
R AXIS - MUSE: 88 DEGREES
SYSTOLIC BLOOD PRESSURE - MUSE: NORMAL MMHG
T AXIS - MUSE: 31 DEGREES
VENTRICULAR RATE- MUSE: 104 BPM

## 2025-07-21 ENCOUNTER — MYC MEDICAL ADVICE (OUTPATIENT)
Dept: SLEEP MEDICINE | Facility: CLINIC | Age: 70
End: 2025-07-21
Payer: MEDICARE

## 2025-07-21 ENCOUNTER — MYC REFILL (OUTPATIENT)
Dept: PALLIATIVE MEDICINE | Facility: OTHER | Age: 70
End: 2025-07-21
Payer: MEDICARE

## 2025-07-21 ENCOUNTER — VIRTUAL VISIT (OUTPATIENT)
Dept: BEHAVIORAL HEALTH | Facility: CLINIC | Age: 70
End: 2025-07-21
Payer: MEDICARE

## 2025-07-21 ENCOUNTER — MYC REFILL (OUTPATIENT)
Dept: INTERNAL MEDICINE | Facility: CLINIC | Age: 70
End: 2025-07-21
Payer: MEDICARE

## 2025-07-21 DIAGNOSIS — J42 CHRONIC BRONCHITIS, UNSPECIFIED CHRONIC BRONCHITIS TYPE (H): ICD-10-CM

## 2025-07-21 DIAGNOSIS — M19.071 OSTEOARTHRITIS OF RIGHT ANKLE AND FOOT: ICD-10-CM

## 2025-07-21 DIAGNOSIS — F41.1 GENERALIZED ANXIETY DISORDER: ICD-10-CM

## 2025-07-21 DIAGNOSIS — F33.2 SEVERE RECURRENT MAJOR DEPRESSION WITHOUT PSYCHOTIC FEATURES (H): Primary | ICD-10-CM

## 2025-07-21 PROCEDURE — 90837 PSYTX W PT 60 MINUTES: CPT | Mod: 95 | Performed by: SOCIAL WORKER

## 2025-07-21 RX ORDER — FLUTICASONE PROPIONATE AND SALMETEROL 250; 50 UG/1; UG/1
1 POWDER RESPIRATORY (INHALATION) EVERY 12 HOURS
Qty: 60 EACH | Refills: 2 | Status: SHIPPED | OUTPATIENT
Start: 2025-07-21

## 2025-07-21 NOTE — PROGRESS NOTES
[facility]                                     Progress Note    Patient Name: Randi Cleary  Date: 2025              Service Type: Individual      Session Start Time:  3:01pm  Session End Time: 3:54pm     Session Length:  53 minutes    Session #: 3    Attendees: Client    Service Modality:  Video Visit:      Provider verified identity through the following two step process.  Patient provided:  Patient , Patient address, and Patient was verified at admission/transfer    Telemedicine Visit: The patient's condition can be safely assessed and treated via synchronous audio and visual telemedicine encounter.      Reason for Telemedicine Visit: Patient has requested telehealth visit    Originating Site (Patient Location): Patient's home    Distant Site (Provider Location): [facility] [place]    Consent:  The patient/guardian has verbally consented to: the potential risks and benefits of telemedicine (video visit) versus in person care; bill my insurance or make self-payment for services provided; and responsibility for payment of non-covered services.     Patient would like the video invitation sent by:  My Chart    Mode of Communication:  Video Conference via [facility]    Distant Location (Provider):  On-site    As the provider I attest to compliance with applicable laws and regulations related to telemedicine.    DATA  Extended Session (53+ minutes): PROLONGED SERVICE IN THE OUTPATIENT SETTING REQUIRING DIRECT (FACE-TO-FACE) PATIENT CONTACT BEYOND THE USUAL SERVICE:    - Treatment protocol required additional time to complete, due to the nature of diagnosis being treated. Patient benefits from additional session time to process thoughts and feelings while practicing learned coping strategies.   See Interventions section for details  Interactive Complexity: No  Crisis: No     Progress Since Last Session (Related to Symptoms / Goals / Homework):   Symptoms: No change  anxiety and depression    Homework: Completed in session      Episode of Care Goals: Satisfactory progress - ACTION (Actively working towards change); Intervened by reinforcing change plan / affirming steps taken     Current / Ongoing Stressors and Concerns:   Had an EKG last week and was in afib. She sees the cardiologist in 2 weeks. She reports higher anxiety levels especially considering various health concerns. She describes having anger outbursts. She identifies some relationship concerns and conflicts due to situational stressors.      Will be participating in a TRD group on Tuesdays.     Treatment Objective(s) Addressed in This Session:   Distress tolerance  Build rapport  Review treatment goals     Intervention:   Therapist and patient continue to work on building rapport, as patient is transitioning from previous therapist. Therapist provided unconditional positive regard and supportive counseling. Therapist reviewed grounding techniques for managing distress. Therapist assessed for risk and safety - patient denied any current SI/SIB.  Should there be an increase in symptoms or severity related to SI/SIB, patient should call 911 or go to local ED for evaluation.    Assessments completed prior to visit:  The following assessments were completed by patient for this visit:  PHQ9:       4/18/2025    10:11 AM 4/21/2025    10:36 AM 5/13/2025     7:54 PM 6/16/2025     8:24 AM 6/30/2025     9:04 AM 7/14/2025     9:06 AM 7/21/2025     7:35 AM   PHQ-9 SCORE   PHQ-9 Total Score MyChart 16 (Moderately severe depression) 16 (Moderately severe depression) 13 (Moderate depression) 8 (Mild depression) 15 (Moderately severe depression) 15 (Moderately severe depression) 14 (Moderate depression)   PHQ-9 Total Score 16  16  13  8  15  15  14        Patient-reported     GAD7:       1/23/2025     8:47 AM 2/10/2025    10:22 PM 3/3/2025    10:45 AM 4/9/2025     8:50 AM 5/13/2025     7:56 PM 6/30/2025     9:06 AM 7/14/2025     9:07  AM   CHAVO-7 SCORE   Total Score 11 (moderate anxiety) 14 (moderate anxiety) 16 (severe anxiety) 12 (moderate anxiety) 13 (moderate anxiety) 10 (moderate anxiety) 16 (severe anxiety)   Total Score 11  14  16  12  13  10  16        Patient-reported     CAGE-AID:       6/22/2020     7:12 PM 1/18/2022    11:15 PM 6/6/2024     8:00 AM   CAGE-AID Total Score   Total Score 4 4 4   Total Score MyChart 4 (A total score of 2 or greater is considered clinically significant) 4 (A total score of 2 or greater is considered clinically significant)      [place] Suicide Severity Rating Scale (Lifetime/Recent)      5/21/2024     4:49 PM 5/21/2024     9:00 PM 6/6/2024     8:00 AM 7/10/2024    12:00 PM 11/22/2024    11:05 AM 2/26/2025     5:59 PM 7/2/2025    12:45 PM   [place] Suicide Severity Rating (Lifetime/Recent)   Q1 Wish to be Dead (Lifetime)  Yes        Comments  OD on medications        Q2 Non-Specific Active Suicidal Thoughts (Lifetime)  No        Most Severe Ideation Rating (Lifetime)  5        Most Severe Ideation Description (Lifetime)  OD on medication        Frequency (Lifetime)  3        Duration (Lifetime)  3        Controllability (Lifetime)  2        Protective Factors  (Lifetime)  4        Reasons for Ideation (Lifetime)  5        Q1 Wished to be Dead (Past Month) 1-->yes 1-->yes 1-->yes  0-->no 0-->no 0-->no   Q2 Suicidal Thoughts (Past Month) 1-->yes 1-->yes 1-->yes  0-->no 0-->no 0-->no   Q3 Suicidal Thought Method 0-->no 0-->no 1-->yes       Q4 Suicidal Intent without Specific Plan 1-->yes 0-->no 0-->no       Q5 Suicide Intent with Specific Plan 0-->no 0-->no 1-->yes       Q6 Suicide Behavior (Lifetime) 1-->yes 0-->no 1-->yes  0-->no 0-->no 0-->no   If yes to Q6, within past 3 months? 1-->yes  0-->no  0-->no        Level of Risk per Screen high risk  moderate risk  high risk   no risks indicated  no risks indicated  no risks indicated   1. Wish to be Dead (Lifetime)    N      2. Non-Specific Active Suicidal  Thoughts (Lifetime)    N          Data saved with a previous flowsheet row definition         ASSESSMENT: Current Emotional / Mental Status (status of significant symptoms):   Risk status (Self / Other harm or suicidal ideation)   Patient denies current fears or concerns for personal safety.   Patient denies current or recent suicidal ideation or behaviors.   Patient denies current or recent homicidal ideation or behaviors.   Patient denies current or recent self injurious behavior or ideation.   Patient denies other safety concerns.   Patient reports there has been no change in risk factors since their last session.     Patient reports there has been no change in protective factors since their last session.     A safety and risk management plan has been developed including: Patient consented to co-developed safety plan on 6/4/24.  Safety and risk management plan was reviewed.   Patient agreed to use safety plan should any safety concerns arise.  A copy was made available to the patient.     Appearance:   Appropriate    Eye Contact:   Good    Psychomotor Behavior: Normal    Attitude:   Cooperative  Friendly   Orientation:   All   Speech    Rate / Production: Talkative    Volume:  Normal    Mood:    Anxious  Normal Sad    Affect:    Appropriate  Bright  Tearful Worrisome    Thought Content:  Clear    Thought Form:  Goal Directed    Insight:    Good      Medication Review:   No changes to current psychiatric medication(s)     Medication Compliance:   Yes     Changes in Health Issues:   None reported     Chemical Use Review:   Substance Use: Chemical use reviewed, no active concerns identified      Tobacco Use: No current tobacco use.      Diagnosis:  1. Severe recurrent major depression without psychotic features (H)    2. Generalized anxiety disorder        Collateral Reports Completed:   Not Applicable    PLAN: (Patient Tasks / Therapist Tasks / Other)  Patient encouraged to continue following safety plan.     Patient will return for a visit in 1 week  Patient will attend TRD group  Patient and therapist will work on distress tolerance and grounding skills while in session    Mimi Orantes, LICSW                                                         ______________________________________________________________________    Individual Treatment Plan    Patient's Name: Randi Cleary  YOB: 1955    Date of Creation: 7/17/2024  Date Treatment Plan Last Reviewed/Revised: 7/9/2025     DSM5 Diagnoses:   296.33 (F33.2) Major Depressive Disorder, Recurrent Episode, Severe   300.02 (F41.1) Generalized Anxiety Disorder  Psychosocial / Contextual Factors: history of trauma  PROMIS (reviewed every 90 days):   PROMIS 10-Global Health (only subscores and total score):       12/18/2024     9:10 AM 12/30/2024     5:44 PM 4/1/2025    11:00 AM 4/9/2025     9:00 AM 4/21/2025    10:39 AM 7/9/2025     8:51 AM 7/21/2025     7:37 AM   PROMIS-10 Scores Only   Global Mental Health Score 7  5  7  6  6  8  6    Global Physical Health Score 10  10  10  10  9  12  9    PROMIS TOTAL - SUBSCORES 17  15  17  16  15  20  15        Patient-reported       Referral / Collaboration:  Discussed and patient will pursue a therapy group for depressive symptoms.    Anticipated number of session for this episode of care: 9-12 sessions  Anticipation frequency of session: Weekly  Anticipated Duration of each session: 38-52 minutes  Treatment plan will be reviewed in 90 days or when goals have been changed.       MeasurableTreatment Goal(s) related to diagnosis / functional impairment(s)  Goal 1: Client will keep self safe and eliminate suicidal ideation and self-harm behaviors.    Objective #A (Client Action)    Client will make a list of at least 10 skills or activities that you will to use to distract from urges to harm self.  Status: Completed - Date: 10/30/24      Intervention(s)  Therapist will provide ideas and strategies for generating  coping skills list.    Objective #B  Client will make a list of pros and cons for tolerating and not tolerating an urge to harm self.  Status: Continued - Date(s): 7/9/2025     Intervention(s)  Therapist will guide client through DBT-style Pros/Cons list for behavior change.    Objective #C  Client will identify and practice the new strategies for dealing with strong emotions, learn and practice relaxation breathing.  Status: Continued - Date(s): 7/21/25      Intervention(s)  Therapist will teach distraction skills. Practice them within session and outside of session.    Goal 2: Client will report reduction in anxiety also as evidenced by reduction of CHAVO-7 score below 6 points within the next 12 weeks.    Objective #A (Client Action)    Client will identify at least three triggers for anxiety.  Status: Completed - Date: 10/30/24      Intervention(s)  Therapist will provide educational materials on common triggers and signs of anxiety.    Objective #B  Client will use relaxation strategies at least two times per day to reduce the physical symptoms of anxiety.  Status: Continued - Date(s): 7/9/2025     Intervention(s)  Therapist will teach relaxation strategies such as mindfulness, deep breathing, muscle relaxation, and sensory activities.    Objective #C  Client will use cognitive strategies identified in therapy to challenge anxious thoughts.  Status: Continued - Date(s): 7/9/2025     Intervention(s)  Therapist will teach strategies for cognitive modification using REBT model.    Goal 3: Client will report improved mood as indicated by PHQ-9 score below 6 for consistent 8 weeks.    Objective #A (Client Action)    Status: Continued - Date: 7/9/2025     Client will Increase interest, engagement, and pleasure in doing things.    Intervention(s)  Therapist will assign homework for daily involvement in pleasant activities.    Objective #B  Client will Identify negative self-talk and behaviors: challenge core beliefs,  myths, and actions.    Status: Continued - Date(s): 7/9/2025     Intervention(s)  Therapist will teach strategies for cognitive modification using REBT models.    Objective #C  Client will Improve quantity and quality of night time sleep / decrease daytime naps.  Status: Continued - Date(s): 7/9/2025     Intervention(s)  Therapist will teach sleep hygiene strategies.  Assign homework for daily practice.    Goal 4: Client will report reduced or eliminated physiological activation when recalling a distressing event including decreased re-experiencing, decreased avoidance, and decreased hyperarousal.    Objective #A (Client Action)    Status: Continued: 7/9/2025      Client will acquire knowledge of trauma, common symptoms post-trauma, emotion regulation strategies, stress management strategies, and cognitive coping strategies.    Intervention(s)  Therapist will teach about effects of trauma on mind and body; encourage emotion identification and expression; practice with client the stress management strategies; and teach cognitive modification.    Objective #B  Client will use Brainspotting while focusing on physiological sensations related to distressing events.  Client will assess and rate level of physiological activation.  Status: Continued - Date(s): 7/9/2025     Intervention(s)  Therapist will provide use a pointer or other materials to assist client in holding a brainspot in their visual field.  Therapist might also provide biolateral music through headphones to deepen the experience.         Patient has reviewed and agreed to the above plan.      Mimi Orantes, [facility]  July 9, 2025

## 2025-07-21 NOTE — TELEPHONE ENCOUNTER
Received call from patient requesting refill(s) oxyCODONE (ROXICODONE) 5 MG tablet    Last dispensed from pharmacy on 07/08/2025    Patient's last office/virtual visit by prescribing provider on 06/09/2025  Next office/virtual appointment scheduled for [facility]    Last urine drug screen date 06/09/2025  Current opioid agreement on file (completed within the last year) Yes Date of opioid agreement: 06/09/2025    E-prescribe to   [facility] [address] [place] [address] [address], pharmacy    Will route to [facility] for review and preparation of prescription(s).     Reina Buchanan MA  [facility] [place]

## 2025-07-23 ENCOUNTER — MYC REFILL (OUTPATIENT)
Dept: PALLIATIVE MEDICINE | Facility: OTHER | Age: 70
End: 2025-07-23
Payer: MEDICARE

## 2025-07-23 DIAGNOSIS — G25.81 RESTLESS LEGS SYNDROME (RLS): ICD-10-CM

## 2025-07-23 RX ORDER — OXYCODONE HYDROCHLORIDE 5 MG/1
5 TABLET ORAL
Qty: 70 TABLET | Refills: 0 | Status: SHIPPED | OUTPATIENT
Start: 2025-07-23

## 2025-07-23 RX ORDER — ROPINIROLE 2 MG/1
2 TABLET, FILM COATED ORAL 3 TIMES DAILY
Qty: 270 TABLET | Refills: 3 | Status: SHIPPED | OUTPATIENT
Start: 2025-07-23

## 2025-07-23 NOTE — TELEPHONE ENCOUNTER
[facility] Call Center    Phone Message    May a detailed message be left on voicemail: yes     Reason for Call: Other: Winnie calling stating she is out of her meds as of today and is needing this filled today.  Please send to the pharmacy as soon as possible.  Please call her back with any questions.      Action Taken: Other: [facility]     Travel Screening: Not Applicable     Date of Service:

## 2025-07-23 NOTE — TELEPHONE ENCOUNTER
Chart reviewed - Patient of Dr. Dubois. Request appears appropriate.  checked, no concerns. Refilled for #70 tabs.     Luba Franklin, TYLER, APRN, AGNP-C  [facility]

## 2025-07-23 NOTE — TELEPHONE ENCOUNTER
Please sign on behalf of Dr Dubois    Pending Prescriptions:                       Disp   Refills    oxyCODONE (ROXICODONE) 5 MG tablet        70 tab*0            Sig: Take 1 tablet (5 mg) by mouth 5 times daily. May           dispense/start 07/23/25 Covering provider   [facility]

## 2025-07-23 NOTE — TELEPHONE ENCOUNTER
Refills have been requested for the following medications:         rOPINIRole (REQUIP) 2 MG tablet [AXEL WIGGINS]      Patient Comment: Please fill Rx at [facility],  [facility].  [facility]. Thank you.      Preferred pharmacy: [id number] - [place], [place] - [address]

## 2025-07-23 NOTE — TELEPHONE ENCOUNTER
Received fax from pharmacy requesting refill(s) for     rOPINIRole (REQUIP) 2 MG tablet    Date last filled:  5/6/2025    Last Appt Date:  6/9/2025    Next Appt scheduled:  10/10/2025    Pharmacy:   [facility] - [place], [place] - [address] route for processing    Luma Rubin [facility]

## 2025-07-26 ENCOUNTER — HOSPITAL ENCOUNTER (OUTPATIENT)
Dept: CT IMAGING | Facility: HOSPITAL | Age: 70
Discharge: HOME | End: 2025-07-26
Attending: NURSE PRACTITIONER | Admitting: NURSE PRACTITIONER
Payer: MEDICARE

## 2025-07-26 DIAGNOSIS — Z87.891 PERSONAL HISTORY OF TOBACCO USE: ICD-10-CM

## 2025-07-26 PROCEDURE — 71271 CT THORAX LUNG CANCER SCR C-: CPT

## 2025-07-27 ASSESSMENT — PATIENT HEALTH QUESTIONNAIRE - PHQ9: SUM OF ALL RESPONSES TO PHQ QUESTIONS 1-9: 15

## 2025-07-28 ENCOUNTER — VIRTUAL VISIT (OUTPATIENT)
Dept: PSYCHIATRY | Facility: CLINIC | Age: 70
End: 2025-07-28
Payer: MEDICARE

## 2025-07-28 VITALS — WEIGHT: 178 LBS | HEIGHT: 66 IN | BODY MASS INDEX: 28.61 KG/M2

## 2025-07-28 DIAGNOSIS — F43.9 TRAUMA AND STRESSOR-RELATED DISORDER: ICD-10-CM

## 2025-07-28 DIAGNOSIS — F33.2 SEVERE RECURRENT MAJOR DEPRESSION WITHOUT PSYCHOTIC FEATURES (H): Primary | ICD-10-CM

## 2025-07-28 DIAGNOSIS — F60.3 BORDERLINE PERSONALITY DISORDER (H): ICD-10-CM

## 2025-07-28 DIAGNOSIS — F41.1 GENERALIZED ANXIETY DISORDER: ICD-10-CM

## 2025-07-28 PROCEDURE — 98006 SYNCH AUDIO-VIDEO EST MOD 30: CPT

## 2025-07-28 PROCEDURE — 90833 PSYTX W PT W E/M 30 MIN: CPT | Mod: 95

## 2025-07-28 PROCEDURE — G2211 COMPLEX E/M VISIT ADD ON: HCPCS | Mod: 95

## 2025-07-28 RX ORDER — GABAPENTIN 100 MG/1
100-300 CAPSULE ORAL 3 TIMES DAILY
Qty: 270 CAPSULE | Refills: 1 | Status: SHIPPED | OUTPATIENT
Start: 2025-07-28

## 2025-07-28 ASSESSMENT — PATIENT HEALTH QUESTIONNAIRE - PHQ9
10. IF YOU CHECKED OFF ANY PROBLEMS, HOW DIFFICULT HAVE THESE PROBLEMS MADE IT FOR YOU TO DO YOUR WORK, TAKE CARE OF THINGS AT HOME, OR GET ALONG WITH OTHER PEOPLE: VERY DIFFICULT
SUM OF ALL RESPONSES TO PHQ QUESTIONS 1-9: 15

## 2025-07-28 NOTE — NURSING NOTE
Current patient location: [address] [place] [address] N  [facility] [address]    Is the patient currently in the state of [place]? YES    Visit mode: VIDEO    If the visit is dropped, the patient can be reconnected by:VIDEO VISIT: Text to cell phone:   Telephone Information:   Mobile [phone]       Will anyone else be joining the visit? NO  (If patient encounters technical issues they should call [phone]    Are changes needed to the allergy or medication list? Yes Pt would like to review medications with provider.     Are refills needed on medications prescribed by this physician? YES    Rooming Documentation:  Questionnaire(s) completed    Reason for visit: RECHECK    Sarika GARCIAF

## 2025-07-28 NOTE — PATIENT INSTRUCTIONS
Plan:  -Continue gabapentin 100-300mg three times daily  -Continue Silexan 160mg nightly    **For crisis resources, please see the information at the end of this document**   Patient Education    Thank you for coming to the [facility] [place] [facility].     Lab Testing:  If you had lab testing today and your results are reassuring or normal they will be mailed to you or sent through [employer] within 7 days. If the lab tests need quick action we will call you with the results. The phone number we will call with results is # [phone]. If this is not the best number please call our clinic and change the number.     Medication Refills:  If you need any refills please call your pharmacy and they will contact us. Our fax number for refills is [phone].   Three business days of notice are needed for general medication refill requests.   Five business days of notice are needed for controlled substance refill requests.   If you need to change to a different pharmacy, please contact the new pharmacy directly. The new pharmacy will help you get your medications transferred.     Contact Us:  Please call [phone] during business hours (8-5:00 M-F).   If you have medication related questions after clinic hours, or on the weekend, please call [phone].     Financial Assistance [phone]   Medical Records [phone]       MENTAL HEALTH CRISIS RESOURCES:  For a emergency help, please call 911 or go to the nearest Emergency Department.     Emergency Walk-In Options:   EmPATH Unit @ [facility] ([place]): [phone] - Specialized mental health emergency area designed to be calming  [facility] ([place]): [phone]  [facility] ([place]): [phone]  [facility]): [phone]    [facility] Crisis Information:   [place]: [phone]  [place]: [phone]  [place] ([place]) - Adult: [phone]     Child:  [phone]  [place] - Adult: [phone]     Child: [phone]  [place]: [phone]  List of all [facility] resources:   https://[place].gov/dhs/people-we-serve/adults/health-care/mental-health/resources/crisis-contacts.jsp    National Crisis Information:   Crisis Text Line: Text  [place]  to 745973  Suicide & Crisis Lifeline: 988  National Suicide Prevention Lifeline: 9-193-441-TALK (1-315.302.3804)       For online chat options, visit https://suicidepreventionlifeline.org/chat/  Poison Control Center: 1-657-326-2180  Trans Lifeline: 1-829.874.4522 - Hotline for transgender people of all ages  The [employer] Project: 7-412-759-7550 - Hotline for LGBT youth     For Non-Emergency Support:   Fast Tracker: Mental Health & Substance Use Disorder Resources -   https://www.AmpriusckViralheatn.org/

## 2025-07-29 ENCOUNTER — OFFICE VISIT (OUTPATIENT)
Dept: PSYCHIATRY | Facility: CLINIC | Age: 70
End: 2025-07-29
Payer: MEDICARE

## 2025-07-29 ENCOUNTER — PATIENT OUTREACH (OUTPATIENT)
Dept: CARE COORDINATION | Facility: CLINIC | Age: 70
End: 2025-07-29
Payer: MEDICARE

## 2025-07-29 DIAGNOSIS — F33.2 SEVERE RECURRENT MAJOR DEPRESSION WITHOUT PSYCHOTIC FEATURES (H): Primary | ICD-10-CM

## 2025-07-29 DIAGNOSIS — F41.1 GENERALIZED ANXIETY DISORDER: ICD-10-CM

## 2025-07-29 ASSESSMENT — ANXIETY QUESTIONNAIRES
GAD7 TOTAL SCORE: 16
6. BECOMING EASILY ANNOYED OR IRRITABLE: NEARLY EVERY DAY
4. TROUBLE RELAXING: MORE THAN HALF THE DAYS
3. WORRYING TOO MUCH ABOUT DIFFERENT THINGS: MORE THAN HALF THE DAYS
2. NOT BEING ABLE TO STOP OR CONTROL WORRYING: NEARLY EVERY DAY
8. IF YOU CHECKED OFF ANY PROBLEMS, HOW DIFFICULT HAVE THESE MADE IT FOR YOU TO DO YOUR WORK, TAKE CARE OF THINGS AT HOME, OR GET ALONG WITH OTHER PEOPLE?: EXTREMELY DIFFICULT
7. FEELING AFRAID AS IF SOMETHING AWFUL MIGHT HAPPEN: MORE THAN HALF THE DAYS
IF YOU CHECKED OFF ANY PROBLEMS ON THIS QUESTIONNAIRE, HOW DIFFICULT HAVE THESE PROBLEMS MADE IT FOR YOU TO DO YOUR WORK, TAKE CARE OF THINGS AT HOME, OR GET ALONG WITH OTHER PEOPLE: EXTREMELY DIFFICULT
5. BEING SO RESTLESS THAT IT IS HARD TO SIT STILL: SEVERAL DAYS
1. FEELING NERVOUS, ANXIOUS, OR ON EDGE: NEARLY EVERY DAY
GAD7 TOTAL SCORE: 16
7. FEELING AFRAID AS IF SOMETHING AWFUL MIGHT HAPPEN: MORE THAN HALF THE DAYS
GAD7 TOTAL SCORE: 16

## 2025-07-29 NOTE — PROGRESS NOTES
Clinic Care Coordination Contact  [facility]    Clinical Data: Care Coordinator Outreach    Outreach Documentation Number of Outreach Attempt   7/29/2025  11:37 AM 1       Unable to leave a message due to: Busy signal.      Plan: Care Coordinator will try to reach patient again in 1-2 business days.    Sola Abraham [facility]  Clinic Care Coordination  [facility]  [email or web address]  [phone]

## 2025-07-30 ENCOUNTER — PATIENT OUTREACH (OUTPATIENT)
Dept: CARE COORDINATION | Facility: CLINIC | Age: 70
End: 2025-07-30
Payer: MEDICARE

## 2025-07-30 ENCOUNTER — MYC MEDICAL ADVICE (OUTPATIENT)
Dept: INTERNAL MEDICINE | Facility: CLINIC | Age: 70
End: 2025-07-30
Payer: MEDICARE

## 2025-07-31 ENCOUNTER — TELEPHONE (OUTPATIENT)
Dept: PSYCHIATRY | Facility: CLINIC | Age: 70
End: 2025-07-31

## 2025-07-31 ENCOUNTER — VIRTUAL VISIT (OUTPATIENT)
Dept: BEHAVIORAL HEALTH | Facility: CLINIC | Age: 70
End: 2025-07-31
Payer: MEDICARE

## 2025-07-31 ENCOUNTER — PATIENT OUTREACH (OUTPATIENT)
Dept: CARE COORDINATION | Facility: CLINIC | Age: 70
End: 2025-07-31
Payer: MEDICARE

## 2025-07-31 DIAGNOSIS — F33.2 SEVERE RECURRENT MAJOR DEPRESSION WITHOUT PSYCHOTIC FEATURES (H): Primary | ICD-10-CM

## 2025-07-31 DIAGNOSIS — F41.1 GENERALIZED ANXIETY DISORDER: ICD-10-CM

## 2025-07-31 LAB — CV ZIO PRELIM RESULTS: NORMAL

## 2025-07-31 ASSESSMENT — ACTIVITIES OF DAILY LIVING (ADL): DEPENDENT_IADLS:: SHOPPING;CLEANING;LAUNDRY;TRANSPORTATION

## 2025-07-31 NOTE — PROGRESS NOTES
[facility]                                     Progress Note    Patient Name: Randi Cleary  Date: 2025              Service Type: Individual      Session Start Time:  9:01am  Session End Time: 9:54am     Session Length:  53 minutes    Session #: 4    Attendees: Client    Service Modality:  Video Visit:      Provider verified identity through the following two step process.  Patient provided:  Patient , Patient address, and Patient was verified at admission/transfer    Telemedicine Visit: The patient's condition can be safely assessed and treated via synchronous audio and visual telemedicine encounter.      Reason for Telemedicine Visit: Patient has requested telehealth visit    Originating Site (Patient Location): Patient's home    Distant Site (Provider Location): Provider Remote Setting- Home Office    Consent:  The patient/guardian has verbally consented to: the potential risks and benefits of telemedicine (video visit) versus in person care; bill my insurance or make self-payment for services provided; and responsibility for payment of non-covered services.     Patient would like the video invitation sent by:  My Chart    Mode of Communication:  [facility]    Distant Location (Provider):  Off-site    As the provider I attest to compliance with applicable laws and regulations related to telemedicine.    DATA  Extended Session (53+ minutes): PROLONGED SERVICE IN THE OUTPATIENT SETTING REQUIRING DIRECT (FACE-TO-FACE) PATIENT CONTACT BEYOND THE USUAL SERVICE:    - Treatment protocol required additional time to complete, due to the nature of diagnosis being treated. Patient benefits from additional session time to process thoughts and feelings while practicing learned coping strategies.   See Interventions section for details  Interactive Complexity: No  Crisis: No     Progress Since Last Session (Related to Symptoms / Goals / Homework):   Symptoms: No change  "anxiety and depression    Homework: Completed in session      Episode of Care Goals: Satisfactory progress - ACTION (Actively working towards change); Intervened by reinforcing change plan / affirming steps taken     Current / Ongoing Stressors and Concerns:   Patient reports that she has \"had a really hard week.\" Patient is interested in receiving care at [facility]. She reports higher anxiety levels especially considering various health concerns. She shares about conflicts in her relationship - \"every day we start out on the wrong foot; feeling under attack.\"      Will be participating in a TRD group on Tuesdays.     Treatment Objective(s) Addressed in This Session:   Distress tolerance       Intervention:   Therapist and patient continue to work on building rapport. Therapist provided unconditional positive regard and supportive counseling. Therapist reviewed grounding techniques for managing distress. Therapist assessed for risk and safety - patient denied any current SI/SIB.  Should there be an increase in symptoms or severity related to SI/SIB, patient should call 911 or go to local ED for evaluation.    Assessments completed prior to visit:  The following assessments were completed by patient for this visit:  PHQ9:       6/16/2025     8:24 AM 6/30/2025     9:04 AM 7/14/2025     9:06 AM 7/21/2025     7:35 AM 7/27/2025     7:36 PM 7/29/2025     8:50 AM 7/31/2025     8:15 AM   PHQ-9 SCORE   PHQ-9 Total Score MyChart 8 (Mild depression) 15 (Moderately severe depression) 15 (Moderately severe depression) 14 (Moderate depression) 15 (Moderately severe depression) 17 (Moderately severe depression) 17 (Moderately severe depression)   PHQ-9 Total Score 8  15  15  14  15  17  17        Patient-reported     GAD7:       2/10/2025    10:22 PM 3/3/2025    10:45 AM 4/9/2025     8:50 AM 5/13/2025     7:56 PM 6/30/2025     9:06 AM 7/14/2025     9:07 AM 7/29/2025     8:51 AM   CHAVO-7 SCORE   Total Score 14 (moderate anxiety) 16 " (severe anxiety) 12 (moderate anxiety) 13 (moderate anxiety) 10 (moderate anxiety) 16 (severe anxiety) 16 (severe anxiety)   Total Score 14  16  12  13  10  16  16        Patient-reported     CAGE-AID:       6/22/2020     7:12 PM 1/18/2022    11:15 PM 6/6/2024     8:00 AM   CAGE-AID Total Score   Total Score 4 4 4   Total Score MyChart 4 (A total score of 2 or greater is considered clinically significant) 4 (A total score of 2 or greater is considered clinically significant)      [place] Suicide Severity Rating Scale (Lifetime/Recent)      5/21/2024     4:49 PM 5/21/2024     9:00 PM 6/6/2024     8:00 AM 7/10/2024    12:00 PM 11/22/2024    11:05 AM 2/26/2025     5:59 PM 7/2/2025    12:45 PM   [place] Suicide Severity Rating (Lifetime/Recent)   Q1 Wish to be Dead (Lifetime)  Yes        Comments  OD on medications        Q2 Non-Specific Active Suicidal Thoughts (Lifetime)  No        Most Severe Ideation Rating (Lifetime)  5        Most Severe Ideation Description (Lifetime)  OD on medication        Frequency (Lifetime)  3        Duration (Lifetime)  3        Controllability (Lifetime)  2        Protective Factors  (Lifetime)  4        Reasons for Ideation (Lifetime)  5        Q1 Wished to be Dead (Past Month) 1-->yes 1-->yes 1-->yes  0-->no 0-->no 0-->no   Q2 Suicidal Thoughts (Past Month) 1-->yes 1-->yes 1-->yes  0-->no 0-->no 0-->no   Q3 Suicidal Thought Method 0-->no 0-->no 1-->yes       Q4 Suicidal Intent without Specific Plan 1-->yes 0-->no 0-->no       Q5 Suicide Intent with Specific Plan 0-->no 0-->no 1-->yes       Q6 Suicide Behavior (Lifetime) 1-->yes 0-->no 1-->yes  0-->no 0-->no 0-->no   If yes to Q6, within past 3 months? 1-->yes  0-->no  0-->no        Level of Risk per Screen high risk  moderate risk  high risk   no risks indicated  no risks indicated  no risks indicated   1. Wish to be Dead (Lifetime)    N      2. Non-Specific Active Suicidal Thoughts (Lifetime)    N          Data saved with a previous  flowsheet row definition         ASSESSMENT: Current Emotional / Mental Status (status of significant symptoms):   Risk status (Self / Other harm or suicidal ideation)   Patient denies current fears or concerns for personal safety.   Patient denies current or recent suicidal ideation or behaviors.   Patient denies current or recent homicidal ideation or behaviors.   Patient denies current or recent self injurious behavior or ideation.   Patient denies other safety concerns.   Patient reports there has been no change in risk factors since their last session.     Patient reports there has been no change in protective factors since their last session.     A safety and risk management plan has been developed including: Patient consented to co-developed safety plan on 6/4/24.  Safety and risk management plan was reviewed.   Patient agreed to use safety plan should any safety concerns arise.  A copy was made available to the patient.     Appearance:   Appropriate    Eye Contact:   Good    Psychomotor Behavior: Normal    Attitude:   Cooperative  Friendly   Orientation:   All   Speech    Rate / Production: Talkative    Volume:  Normal    Mood:    Anxious  Normal Sad    Affect:    Appropriate  Bright  Tearful Worrisome    Thought Content:  Clear    Thought Form:  Goal Directed    Insight:    Good      Medication Review:   No changes to current psychiatric medication(s)     Medication Compliance:   Yes     Changes in Health Issues:   None reported     Chemical Use Review:   Substance Use: Chemical use reviewed, no active concerns identified      Tobacco Use: No current tobacco use.      Diagnosis:  1. Severe recurrent major depression without psychotic features (H)    2. Generalized anxiety disorder        Collateral Reports Completed:   Not Applicable    PLAN: (Patient Tasks / Therapist Tasks / Other)  Patient encouraged to continue following safety plan.    Patient will return for a visit in 2 weeks  Patient will attend TRD  group  Patient and therapist will work on distress tolerance and grounding skills while in session    Patient encouraged to follow up with care coordinator:  Sola Abraham, [facility]  Clinic Care Coordination  [facility]  [email or web address]  [phone]    Mimi Orantes, [facility]                                                         ______________________________________________________________________    Individual Treatment Plan    Patient's Name: Randi Cleary  YOB: 1955    Date of Creation: 7/17/2024  Date Treatment Plan Last Reviewed/Revised: 7/9/2025     DSM5 Diagnoses:   296.33 (F33.2) Major Depressive Disorder, Recurrent Episode, Severe   300.02 (F41.1) Generalized Anxiety Disorder  Psychosocial / Contextual Factors: history of trauma  PROMIS (reviewed every 90 days):   PROMIS 10-Global Health (only subscores and total score):       12/18/2024     9:10 AM 12/30/2024     5:44 PM 4/1/2025    11:00 AM 4/9/2025     9:00 AM 4/21/2025    10:39 AM 7/9/2025     8:51 AM 7/21/2025     7:37 AM   PROMIS-10 Scores Only   Global Mental Health Score 7  5  7  6  6  8  6    Global Physical Health Score 10  10  10  10  9  12  9    PROMIS TOTAL - SUBSCORES 17  15  17  16  15  20  15        Patient-reported       Referral / Collaboration:  Discussed and patient will pursue a therapy group for depressive symptoms.    Anticipated number of session for this episode of care: 9-12 sessions  Anticipation frequency of session: Weekly  Anticipated Duration of each session: 38-52 minutes  Treatment plan will be reviewed in 90 days or when goals have been changed.       MeasurableTreatment Goal(s) related to diagnosis / functional impairment(s)  Goal 1: Client will keep self safe and eliminate suicidal ideation and self-harm behaviors.    Objective #A (Client Action)    Client will make a list of at least 10 skills or activities that you will to use to distract from urges to harm self.  Status: Completed  - Date: 10/30/24      Intervention(s)  Therapist will provide ideas and strategies for generating coping skills list.    Objective #B  Client will make a list of pros and cons for tolerating and not tolerating an urge to harm self.  Status: Continued - Date(s): 7/9/2025     Intervention(s)  Therapist will guide client through DBT-style Pros/Cons list for behavior change.    Objective #C  Client will identify and practice the new strategies for dealing with strong emotions, learn and practice relaxation breathing.  Status: Continued - Date(s): 7/31/25      Intervention(s)  Therapist will teach distraction skills. Practice them within session and outside of session.    Goal 2: Client will report reduction in anxiety also as evidenced by reduction of CHAVO-7 score below 6 points within the next 12 weeks.    Objective #A (Client Action)    Client will identify at least three triggers for anxiety.  Status: Completed - Date: 10/30/24      Intervention(s)  Therapist will provide educational materials on common triggers and signs of anxiety.    Objective #B  Client will use relaxation strategies at least two times per day to reduce the physical symptoms of anxiety.  Status: Continued - Date(s): 7/9/2025     Intervention(s)  Therapist will teach relaxation strategies such as mindfulness, deep breathing, muscle relaxation, and sensory activities.    Objective #C  Client will use cognitive strategies identified in therapy to challenge anxious thoughts.  Status: Continued - Date(s): 7/9/2025     Intervention(s)  Therapist will teach strategies for cognitive modification using REBT model.    Goal 3: Client will report improved mood as indicated by PHQ-9 score below 6 for consistent 8 weeks.    Objective #A (Client Action)    Status: Continued - Date: 7/9/2025     Client will Increase interest, engagement, and pleasure in doing things.    Intervention(s)  Therapist will assign homework for daily involvement in pleasant  activities.    Objective #B  Client will Identify negative self-talk and behaviors: challenge core beliefs, myths, and actions.    Status: Continued - Date(s): 7/9/2025     Intervention(s)  Therapist will teach strategies for cognitive modification using REBT models.    Objective #C  Client will Improve quantity and quality of night time sleep / decrease daytime naps.  Status: Continued - Date(s): 7/9/2025     Intervention(s)  Therapist will teach sleep hygiene strategies.  Assign homework for daily practice.    Goal 4: Client will report reduced or eliminated physiological activation when recalling a distressing event including decreased re-experiencing, decreased avoidance, and decreased hyperarousal.    Objective #A (Client Action)    Status: Continued: 7/9/2025      Client will acquire knowledge of trauma, common symptoms post-trauma, emotion regulation strategies, stress management strategies, and cognitive coping strategies.    Intervention(s)  Therapist will teach about effects of trauma on mind and body; encourage emotion identification and expression; practice with client the stress management strategies; and teach cognitive modification.    Objective #B  Client will use Brainspotting while focusing on physiological sensations related to distressing events.  Client will assess and rate level of physiological activation.  Status: Continued - Date(s): 7/9/2025     Intervention(s)  Therapist will provide use a pointer or other materials to assist client in holding a brainspot in their visual field.  Therapist might also provide biolateral music through headphones to deepen the experience.         Patient has reviewed and agreed to the above plan.      Mimi Orantes, [facility]  July 9, 2025

## 2025-07-31 NOTE — PROGRESS NOTES
Clinic Care Coordination Contact  [facility]    Clinical Data: Care Coordinator Outreach    Outreach Documentation Number of Outreach Attempt   7/29/2025  11:37 AM 1   7/31/2025   2:38 PM 2       Patient reports that she is almost to a doctor appointment. She requested a call back tomorrow, 8/1, at 9:30am.    Plan: Care Coordinator will try to reach patient again in 1-2 business days.    [facility]  Clinic Care Coordination  [facility]  [email or web address]  [phone]

## 2025-08-02 LAB — CV ZIO PRELIM RESULTS: NORMAL

## 2025-08-05 ENCOUNTER — MYC REFILL (OUTPATIENT)
Dept: PALLIATIVE MEDICINE | Facility: OTHER | Age: 70
End: 2025-08-05

## 2025-08-05 ENCOUNTER — OFFICE VISIT (OUTPATIENT)
Dept: CARDIOLOGY | Facility: CLINIC | Age: 70
End: 2025-08-05
Attending: NURSE PRACTITIONER
Payer: MEDICARE

## 2025-08-05 VITALS
WEIGHT: 180.2 LBS | DIASTOLIC BLOOD PRESSURE: 69 MMHG | OXYGEN SATURATION: 94 % | SYSTOLIC BLOOD PRESSURE: 113 MMHG | BODY MASS INDEX: 29.09 KG/M2 | HEART RATE: 74 BPM

## 2025-08-05 DIAGNOSIS — M19.071 OSTEOARTHRITIS OF RIGHT ANKLE AND FOOT: ICD-10-CM

## 2025-08-05 DIAGNOSIS — I48.91 NEW ONSET A-FIB (H): Primary | ICD-10-CM

## 2025-08-05 DIAGNOSIS — I25.10 CORONARY ARTERY DISEASE INVOLVING NATIVE CORONARY ARTERY OF NATIVE HEART WITHOUT ANGINA PECTORIS: ICD-10-CM

## 2025-08-05 PROCEDURE — 3074F SYST BP LT 130 MM HG: CPT | Performed by: NURSE PRACTITIONER

## 2025-08-05 PROCEDURE — 99215 OFFICE O/P EST HI 40 MIN: CPT | Performed by: NURSE PRACTITIONER

## 2025-08-05 PROCEDURE — G0463 HOSPITAL OUTPT CLINIC VISIT: HCPCS | Performed by: NURSE PRACTITIONER

## 2025-08-05 PROCEDURE — 99417 PROLNG OP E/M EACH 15 MIN: CPT | Performed by: NURSE PRACTITIONER

## 2025-08-05 PROCEDURE — 1126F AMNT PAIN NOTED NONE PRSNT: CPT | Performed by: NURSE PRACTITIONER

## 2025-08-05 PROCEDURE — 93005 ELECTROCARDIOGRAM TRACING: CPT

## 2025-08-05 PROCEDURE — 3078F DIAST BP <80 MM HG: CPT | Performed by: NURSE PRACTITIONER

## 2025-08-05 RX ORDER — SOLIFENACIN SUCCINATE 5 MG/1
5 TABLET, FILM COATED ORAL DAILY
COMMUNITY

## 2025-08-05 RX ORDER — ATORVASTATIN CALCIUM 20 MG/1
20 TABLET, FILM COATED ORAL DAILY
Qty: 90 TABLET | Refills: 3 | Status: SHIPPED | OUTPATIENT
Start: 2025-08-05

## 2025-08-05 RX ORDER — OXYCODONE HYDROCHLORIDE 5 MG/1
5 TABLET ORAL
Qty: 70 TABLET | Refills: 0 | Status: SHIPPED | OUTPATIENT
Start: 2025-08-05 | End: 2025-08-06

## 2025-08-05 RX ORDER — DILTIAZEM HCL 60 MG
TABLET ORAL
Qty: 30 TABLET | Refills: 1 | Status: SHIPPED | OUTPATIENT
Start: 2025-08-05

## 2025-08-05 ASSESSMENT — PAIN SCALES - GENERAL: PAINLEVEL_OUTOF10: NO PAIN (0)

## 2025-08-06 LAB
ATRIAL RATE - MUSE: 72 BPM
DIASTOLIC BLOOD PRESSURE - MUSE: NORMAL MMHG
INTERPRETATION ECG - MUSE: NORMAL
P AXIS - MUSE: 69 DEGREES
PR INTERVAL - MUSE: 182 MS
QRS DURATION - MUSE: 106 MS
QT - MUSE: 410 MS
QTC - MUSE: 448 MS
R AXIS - MUSE: 77 DEGREES
SYSTOLIC BLOOD PRESSURE - MUSE: NORMAL MMHG
T AXIS - MUSE: 40 DEGREES
VENTRICULAR RATE- MUSE: 72 BPM

## 2025-08-06 RX ORDER — OXYCODONE HYDROCHLORIDE 5 MG/1
5 TABLET ORAL
Qty: 70 TABLET | Refills: 0 | Status: SHIPPED | OUTPATIENT
Start: 2025-08-06

## 2025-08-07 ENCOUNTER — OFFICE VISIT (OUTPATIENT)
Dept: INTERNAL MEDICINE | Facility: CLINIC | Age: 70
End: 2025-08-07
Payer: MEDICARE

## 2025-08-07 VITALS
HEIGHT: 66 IN | HEART RATE: 86 BPM | TEMPERATURE: 98.1 F | BODY MASS INDEX: 28.93 KG/M2 | RESPIRATION RATE: 14 BRPM | SYSTOLIC BLOOD PRESSURE: 118 MMHG | WEIGHT: 180 LBS | OXYGEN SATURATION: 97 % | DIASTOLIC BLOOD PRESSURE: 66 MMHG

## 2025-08-07 DIAGNOSIS — I25.10 CORONARY ARTERY DISEASE INVOLVING NATIVE CORONARY ARTERY OF NATIVE HEART WITHOUT ANGINA PECTORIS: ICD-10-CM

## 2025-08-07 DIAGNOSIS — R93.89 ABNORMAL CT OF THE CHEST: ICD-10-CM

## 2025-08-07 DIAGNOSIS — E11.59 TYPE 2 DIABETES MELLITUS WITH OTHER CIRCULATORY COMPLICATION, UNSPECIFIED WHETHER LONG TERM INSULIN USE (H): ICD-10-CM

## 2025-08-07 DIAGNOSIS — M19.042 PRIMARY OSTEOARTHRITIS OF BOTH HANDS: ICD-10-CM

## 2025-08-07 DIAGNOSIS — M19.041 PRIMARY OSTEOARTHRITIS OF BOTH HANDS: ICD-10-CM

## 2025-08-07 DIAGNOSIS — I48.0 PAROXYSMAL ATRIAL FIBRILLATION (H): ICD-10-CM

## 2025-08-07 DIAGNOSIS — R73.03 PREDIABETES: Primary | ICD-10-CM

## 2025-08-07 DIAGNOSIS — M62.81 GENERALIZED MUSCLE WEAKNESS: ICD-10-CM

## 2025-08-07 DIAGNOSIS — F33.2 SEVERE EPISODE OF RECURRENT MAJOR DEPRESSIVE DISORDER, WITHOUT PSYCHOTIC FEATURES (H): ICD-10-CM

## 2025-08-07 LAB
CREAT UR-MCNC: 41.2 MG/DL
EST. AVERAGE GLUCOSE BLD GHB EST-MCNC: 111 MG/DL
HBA1C MFR BLD: 5.5 % (ref 0–5.6)
MICROALBUMIN UR-MCNC: <12 MG/L
MICROALBUMIN/CREAT UR: NORMAL MG/G{CREAT}

## 2025-08-11 ENCOUNTER — VIRTUAL VISIT (OUTPATIENT)
Dept: BEHAVIORAL HEALTH | Facility: CLINIC | Age: 70
End: 2025-08-11
Payer: MEDICARE

## 2025-08-11 DIAGNOSIS — F33.2 SEVERE RECURRENT MAJOR DEPRESSION WITHOUT PSYCHOTIC FEATURES (H): Primary | ICD-10-CM

## 2025-08-11 DIAGNOSIS — F41.1 GENERALIZED ANXIETY DISORDER: ICD-10-CM

## 2025-08-11 PROCEDURE — 90837 PSYTX W PT 60 MINUTES: CPT | Mod: 95 | Performed by: SOCIAL WORKER

## 2025-08-12 ENCOUNTER — PATIENT OUTREACH (OUTPATIENT)
Dept: CARE COORDINATION | Facility: CLINIC | Age: 70
End: 2025-08-12
Payer: MEDICARE

## 2025-08-12 ENCOUNTER — OFFICE VISIT (OUTPATIENT)
Dept: PSYCHIATRY | Facility: CLINIC | Age: 70
End: 2025-08-12
Payer: MEDICARE

## 2025-08-12 DIAGNOSIS — F33.2 SEVERE RECURRENT MAJOR DEPRESSION WITHOUT PSYCHOTIC FEATURES (H): Primary | ICD-10-CM

## 2025-08-12 DIAGNOSIS — F41.1 GENERALIZED ANXIETY DISORDER: ICD-10-CM

## 2025-08-14 ENCOUNTER — PATIENT OUTREACH (OUTPATIENT)
Dept: CARE COORDINATION | Facility: CLINIC | Age: 70
End: 2025-08-14
Payer: MEDICARE

## 2025-08-14 ENCOUNTER — TELEPHONE (OUTPATIENT)
Dept: BEHAVIORAL HEALTH | Facility: CLINIC | Age: 70
End: 2025-08-14
Payer: MEDICARE

## 2025-08-16 ENCOUNTER — HEALTH MAINTENANCE LETTER (OUTPATIENT)
Age: 70
End: 2025-08-16

## 2025-08-18 ENCOUNTER — VIRTUAL VISIT (OUTPATIENT)
Dept: BEHAVIORAL HEALTH | Facility: CLINIC | Age: 70
End: 2025-08-18
Payer: MEDICARE

## 2025-08-18 DIAGNOSIS — F41.1 GENERALIZED ANXIETY DISORDER: ICD-10-CM

## 2025-08-18 DIAGNOSIS — F33.2 SEVERE RECURRENT MAJOR DEPRESSION WITHOUT PSYCHOTIC FEATURES (H): Primary | ICD-10-CM

## 2025-08-18 PROCEDURE — 90837 PSYTX W PT 60 MINUTES: CPT | Mod: 95 | Performed by: SOCIAL WORKER

## 2025-08-19 ENCOUNTER — TELEPHONE (OUTPATIENT)
Dept: PULMONOLOGY | Facility: CLINIC | Age: 70
End: 2025-08-19
Payer: MEDICARE

## 2025-08-19 DIAGNOSIS — R91.8 PULMONARY NODULES: ICD-10-CM

## 2025-08-19 DIAGNOSIS — J47.9 BRONCHIECTASIS (H): Primary | ICD-10-CM

## 2025-08-20 ENCOUNTER — MYC REFILL (OUTPATIENT)
Dept: PALLIATIVE MEDICINE | Facility: OTHER | Age: 70
End: 2025-08-20
Payer: MEDICARE

## 2025-08-20 ENCOUNTER — MYC MEDICAL ADVICE (OUTPATIENT)
Dept: PALLIATIVE MEDICINE | Facility: OTHER | Age: 70
End: 2025-08-20
Payer: MEDICARE

## 2025-08-20 DIAGNOSIS — M19.071 OSTEOARTHRITIS OF RIGHT ANKLE AND FOOT: ICD-10-CM

## 2025-08-21 RX ORDER — OXYCODONE HYDROCHLORIDE 5 MG/1
5 TABLET ORAL
Qty: 70 TABLET | Refills: 0 | Status: SHIPPED | OUTPATIENT
Start: 2025-08-21

## 2025-08-22 ENCOUNTER — TELEPHONE (OUTPATIENT)
Dept: INTERNAL MEDICINE | Facility: CLINIC | Age: 70
End: 2025-08-22
Payer: MEDICARE

## 2025-08-22 DIAGNOSIS — R92.30 DENSE BREAST TISSUE ON MAMMOGRAM, UNSPECIFIED TYPE: ICD-10-CM

## 2025-08-22 DIAGNOSIS — Z12.31 ENCOUNTER FOR SCREENING MAMMOGRAM FOR BREAST CANCER: Primary | ICD-10-CM

## 2025-08-22 DIAGNOSIS — R93.89 ABNORMAL CHEST CT: ICD-10-CM

## 2025-08-26 ENCOUNTER — PATIENT OUTREACH (OUTPATIENT)
Dept: CARE COORDINATION | Facility: CLINIC | Age: 70
End: 2025-08-26
Payer: MEDICARE

## 2025-08-27 ENCOUNTER — VIRTUAL VISIT (OUTPATIENT)
Dept: BEHAVIORAL HEALTH | Facility: CLINIC | Age: 70
End: 2025-08-27
Payer: MEDICARE

## 2025-08-27 DIAGNOSIS — F33.2 SEVERE RECURRENT MAJOR DEPRESSION WITHOUT PSYCHOTIC FEATURES (H): Primary | ICD-10-CM

## 2025-08-27 DIAGNOSIS — F41.1 GENERALIZED ANXIETY DISORDER: ICD-10-CM

## 2025-08-27 PROCEDURE — 90837 PSYTX W PT 60 MINUTES: CPT | Mod: 95 | Performed by: SOCIAL WORKER

## 2025-09-02 ENCOUNTER — ONCOLOGY VISIT (OUTPATIENT)
Dept: ONCOLOGY | Facility: CLINIC | Age: 70
End: 2025-09-02
Attending: INTERNAL MEDICINE
Payer: MEDICARE

## 2025-09-02 VITALS
WEIGHT: 177.9 LBS | BODY MASS INDEX: 28.71 KG/M2 | TEMPERATURE: 98 F | OXYGEN SATURATION: 96 % | RESPIRATION RATE: 18 BRPM | SYSTOLIC BLOOD PRESSURE: 105 MMHG | HEART RATE: 80 BPM | DIASTOLIC BLOOD PRESSURE: 67 MMHG

## 2025-09-02 DIAGNOSIS — E83.119 HEMOCHROMATOSIS, UNSPECIFIED HEMOCHROMATOSIS TYPE: Primary | ICD-10-CM

## 2025-09-02 DIAGNOSIS — F41.1 GENERALIZED ANXIETY DISORDER: ICD-10-CM

## 2025-09-02 DIAGNOSIS — G25.81 RESTLESS LEG SYNDROME: ICD-10-CM

## 2025-09-02 DIAGNOSIS — F33.2 SEVERE EPISODE OF RECURRENT MAJOR DEPRESSIVE DISORDER, WITHOUT PSYCHOTIC FEATURES (H): ICD-10-CM

## 2025-09-02 DIAGNOSIS — I48.0 PAROXYSMAL ATRIAL FIBRILLATION (H): ICD-10-CM

## 2025-09-02 LAB
ALBUMIN SERPL BCG-MCNC: 4.2 G/DL (ref 3.5–5.2)
ALP SERPL-CCNC: 61 U/L (ref 40–150)
ALT SERPL W P-5'-P-CCNC: 15 U/L (ref 0–50)
ANION GAP SERPL CALCULATED.3IONS-SCNC: 16 MMOL/L (ref 7–15)
AST SERPL W P-5'-P-CCNC: 26 U/L (ref 0–45)
BASOPHILS # BLD AUTO: 0.03 10E3/UL (ref 0–0.2)
BASOPHILS NFR BLD AUTO: 0.4 %
BILIRUB SERPL-MCNC: 0.4 MG/DL
BUN SERPL-MCNC: 15.3 MG/DL (ref 8–23)
CALCIUM SERPL-MCNC: 9.3 MG/DL (ref 8.8–10.4)
CHLORIDE SERPL-SCNC: 104 MMOL/L (ref 98–107)
CREAT SERPL-MCNC: 0.6 MG/DL (ref 0.51–0.95)
EGFRCR SERPLBLD CKD-EPI 2021: >90 ML/MIN/1.73M2
EOSINOPHIL # BLD AUTO: 0.1 10E3/UL (ref 0–0.7)
EOSINOPHIL NFR BLD AUTO: 1.4 %
ERYTHROCYTE [DISTWIDTH] IN BLOOD BY AUTOMATED COUNT: 12 % (ref 10–15)
ERYTHROCYTE [SEDIMENTATION RATE] IN BLOOD BY WESTERGREN METHOD: 8 MM/HR (ref 0–30)
FERRITIN SERPL-MCNC: 177 NG/ML (ref 11–328)
GLUCOSE SERPL-MCNC: 70 MG/DL (ref 70–99)
HCO3 SERPL-SCNC: 20 MMOL/L (ref 22–29)
HCT VFR BLD AUTO: 47.1 % (ref 35–47)
HGB BLD-MCNC: 16.4 G/DL (ref 11.7–15.7)
IMM GRANULOCYTES # BLD: <0.03 10E3/UL
IMM GRANULOCYTES NFR BLD: 0.3 %
IRON BINDING CAPACITY (ROCHE): 261 UG/DL (ref 240–430)
IRON SATN MFR SERPL: 87 % (ref 15–46)
IRON SERPL-MCNC: 226 UG/DL (ref 37–145)
LDH SERPL L TO P-CCNC: 220 U/L (ref 0–250)
LYMPHOCYTES # BLD AUTO: 1.66 10E3/UL (ref 0.8–5.3)
LYMPHOCYTES NFR BLD AUTO: 22.6 %
MCH RBC QN AUTO: 32.8 PG (ref 26.5–33)
MCHC RBC AUTO-ENTMCNC: 34.8 G/DL (ref 31.5–36.5)
MCV RBC AUTO: 94.2 FL (ref 78–100)
MONOCYTES # BLD AUTO: 0.61 10E3/UL (ref 0–1.3)
MONOCYTES NFR BLD AUTO: 8.3 %
NEUTROPHILS # BLD AUTO: 4.94 10E3/UL (ref 1.6–8.3)
NEUTROPHILS NFR BLD AUTO: 67 %
NRBC # BLD AUTO: <0.03 10E3/UL
NRBC BLD AUTO-RTO: 0 /100
PLATELET # BLD AUTO: 176 10E3/UL (ref 150–450)
POTASSIUM SERPL-SCNC: 4.2 MMOL/L (ref 3.4–5.3)
PROT SERPL-MCNC: 6.9 G/DL (ref 6.4–8.3)
RBC # BLD AUTO: 5 10E6/UL (ref 3.8–5.2)
RETICS # AUTO: 0.08 10E6/UL (ref 0.03–0.1)
RETICS/RBC NFR AUTO: 1.69 % (ref 0.5–2)
SODIUM SERPL-SCNC: 140 MMOL/L (ref 135–145)
WBC # BLD AUTO: 7.36 10E3/UL (ref 4–11)

## 2025-09-02 PROCEDURE — 83540 ASSAY OF IRON: CPT | Performed by: INTERNAL MEDICINE

## 2025-09-02 PROCEDURE — G0463 HOSPITAL OUTPT CLINIC VISIT: HCPCS | Performed by: INTERNAL MEDICINE

## 2025-09-02 PROCEDURE — 85045 AUTOMATED RETICULOCYTE COUNT: CPT | Performed by: INTERNAL MEDICINE

## 2025-09-02 PROCEDURE — G2211 COMPLEX E/M VISIT ADD ON: HCPCS | Performed by: INTERNAL MEDICINE

## 2025-09-02 PROCEDURE — 80053 COMPREHEN METABOLIC PANEL: CPT | Performed by: INTERNAL MEDICINE

## 2025-09-02 PROCEDURE — 36415 COLL VENOUS BLD VENIPUNCTURE: CPT | Performed by: INTERNAL MEDICINE

## 2025-09-02 PROCEDURE — 83615 LACTATE (LD) (LDH) ENZYME: CPT | Performed by: INTERNAL MEDICINE

## 2025-09-02 PROCEDURE — 85652 RBC SED RATE AUTOMATED: CPT | Performed by: INTERNAL MEDICINE

## 2025-09-02 PROCEDURE — 82728 ASSAY OF FERRITIN: CPT | Performed by: INTERNAL MEDICINE

## 2025-09-02 PROCEDURE — 85004 AUTOMATED DIFF WBC COUNT: CPT | Performed by: INTERNAL MEDICINE

## 2025-09-02 PROCEDURE — 99214 OFFICE O/P EST MOD 30 MIN: CPT | Performed by: INTERNAL MEDICINE

## 2025-09-02 ASSESSMENT — PAIN SCALES - GENERAL: PAINLEVEL_OUTOF10: SEVERE PAIN (7)

## 2025-09-04 ENCOUNTER — PATIENT OUTREACH (OUTPATIENT)
Dept: CARE COORDINATION | Facility: CLINIC | Age: 70
End: 2025-09-04
Payer: MEDICARE

## (undated) DEVICE — DRAIN RESERVOIR 100ML JP 0070740

## (undated) DEVICE — KIT HAND CONTROL ACIST 014644 AR-P54

## (undated) DEVICE — BLADE CLIPPER PIVOT SPEC GRAY DISP 9690

## (undated) DEVICE — KIT RIGHT HEART CATH 60130719

## (undated) DEVICE — STPL SKIN 35W 6.9MM  PXW35

## (undated) DEVICE — RX SURGIFLO HEMOSTATIC MATRIX 8ML 2991

## (undated) DEVICE — SUCTION MANIFOLD NEPTUNE 2 SYS 1 PORT 702-025-000

## (undated) DEVICE — PLATE GROUNDING ADULT W/CORD 9165L

## (undated) DEVICE — TRAY PREP DRY SKIN SCRUB 067

## (undated) DEVICE — TOOL DISSECT MIDAS MR8 7CM TWIST DRILL 1.1MM MR8-7TD114

## (undated) DEVICE — DRSG PRIMAPORE 03 1/8X6" 66000318

## (undated) DEVICE — CATH ANGIO INFINITI 3DRC 4FRX100CM 538476

## (undated) DEVICE — CUSTOM PACK LUMBAR FUSION SNE5BLFHEA

## (undated) DEVICE — Device

## (undated) DEVICE — ESU PENCIL SMOKE EVAC W/ROCKER SWITCH 0703-047-000

## (undated) DEVICE — SPONGE NEURO 1/2X1/2 WECK 200100

## (undated) DEVICE — DRAIN FLAT 10MM FULL PERF SIL 0070440

## (undated) DEVICE — INTRO SHEATH 7FRX10CM PINNACLE RSS702

## (undated) DEVICE — PREP DURAPREP 06ML APL 8635

## (undated) DEVICE — SOL WATER IRRIG 1000ML BOTTLE 2F7114

## (undated) DEVICE — DRSG GAUZE 4X4" 3033

## (undated) DEVICE — CATH ANGIO INFINITI JL4 4FRX100CM 538420

## (undated) DEVICE — ESU ELEC BLADE 2.75" COATED/INSULATED E1455

## (undated) DEVICE — PACK HEART RIGHT CUSTOM SAN32RHF18

## (undated) DEVICE — IOM STANDARD SUPPLY FEE

## (undated) DEVICE — SU VICRYL+ 0 8-18 CT1/CR UND VCP840D

## (undated) DEVICE — MANIFOLD KIT ANGIO AUTOMATED 014613

## (undated) DEVICE — TOOL DISSECT MIDAS MR8 14CM MATCH HEAD 3MM MR8-14MH30

## (undated) DEVICE — MARKER SURG SKIN STRL 77734

## (undated) DEVICE — NEEDLE HYPO 18X1-1/2 SAFETY 305918

## (undated) DEVICE — IOM CASE FLAT FEE

## (undated) DEVICE — PACK HEART LEFT CUSTOM

## (undated) DEVICE — GLOVE UNDER INDICATOR PI SZ 6.5 LF 41665

## (undated) DEVICE — INTRO SHEATH AVANTI 4FRX23CM 504604T

## (undated) DEVICE — DECANTER VIAL 2006S

## (undated) DEVICE — CATH TRAY FOLEY SURESTEP 16FR DRAIN BAG STATOCK A899916

## (undated) DEVICE — SOL NACL 0.9% IRRIG 1000ML BOTTLE 2F7124

## (undated) DEVICE — PIN MAYFIELD SKULL DISP A1083

## (undated) DEVICE — GLOVE BIOGEL PI ULTRATOUCH G SZ 6.5 42165

## (undated) DEVICE — PREP SCRUB SOL EXIDINE 4% CHG 4OZ 29002-404

## (undated) DEVICE — SUTURE VICRYL+ 2-0 18 CT1/CR VLT VCP839D

## (undated) DEVICE — GOWN LG DISP 9515

## (undated) DEVICE — SU ETHIBOND 2-0 SH 30" X833H

## (undated) RX ORDER — FENTANYL CITRATE 50 UG/ML
INJECTION, SOLUTION INTRAMUSCULAR; INTRAVENOUS
Status: DISPENSED
Start: 2021-02-25

## (undated) RX ORDER — LABETALOL HYDROCHLORIDE 5 MG/ML
INJECTION, SOLUTION INTRAVENOUS
Status: DISPENSED
Start: 2024-06-17

## (undated) RX ORDER — LABETALOL HYDROCHLORIDE 5 MG/ML
INJECTION, SOLUTION INTRAVENOUS
Status: DISPENSED
Start: 2024-10-25

## (undated) RX ORDER — METHOHEXITAL IN WATER/PF 100MG/10ML
SYRINGE (ML) INTRAVENOUS
Status: DISPENSED
Start: 2025-01-22

## (undated) RX ORDER — METHOHEXITAL IN WATER/PF 100MG/10ML
SYRINGE (ML) INTRAVENOUS
Status: DISPENSED
Start: 2024-09-27

## (undated) RX ORDER — METHOHEXITAL IN WATER/PF 100MG/10ML
SYRINGE (ML) INTRAVENOUS
Status: DISPENSED
Start: 2024-06-10

## (undated) RX ORDER — DIAZEPAM 5 MG
TABLET ORAL
Status: DISPENSED
Start: 2024-09-25

## (undated) RX ORDER — KETOROLAC TROMETHAMINE 30 MG/ML
INJECTION, SOLUTION INTRAMUSCULAR; INTRAVENOUS
Status: DISPENSED
Start: 2024-09-27

## (undated) RX ORDER — METHOHEXITAL IN WATER/PF 100MG/10ML
SYRINGE (ML) INTRAVENOUS
Status: DISPENSED
Start: 2024-07-12

## (undated) RX ORDER — LABETALOL HYDROCHLORIDE 5 MG/ML
INJECTION, SOLUTION INTRAVENOUS
Status: DISPENSED
Start: 2024-06-21

## (undated) RX ORDER — KETOROLAC TROMETHAMINE 30 MG/ML
INJECTION, SOLUTION INTRAMUSCULAR; INTRAVENOUS
Status: DISPENSED
Start: 2024-10-25

## (undated) RX ORDER — NICARDIPINE HCL-0.9% SOD CHLOR 1 MG/10 ML
SYRINGE (ML) INTRAVENOUS
Status: DISPENSED
Start: 2025-01-22

## (undated) RX ORDER — LABETALOL HYDROCHLORIDE 5 MG/ML
INJECTION, SOLUTION INTRAVENOUS
Status: DISPENSED
Start: 2024-07-19

## (undated) RX ORDER — KETOROLAC TROMETHAMINE 30 MG/ML
INJECTION, SOLUTION INTRAMUSCULAR; INTRAVENOUS
Status: DISPENSED
Start: 2025-02-05

## (undated) RX ORDER — LABETALOL HYDROCHLORIDE 5 MG/ML
INJECTION, SOLUTION INTRAVENOUS
Status: DISPENSED
Start: 2024-10-11

## (undated) RX ORDER — NICARDIPINE HCL-0.9% SOD CHLOR 1 MG/10 ML
SYRINGE (ML) INTRAVENOUS
Status: DISPENSED
Start: 2024-11-08

## (undated) RX ORDER — LABETALOL HYDROCHLORIDE 5 MG/ML
INJECTION, SOLUTION INTRAVENOUS
Status: DISPENSED
Start: 2024-09-04

## (undated) RX ORDER — KETOROLAC TROMETHAMINE 30 MG/ML
INJECTION, SOLUTION INTRAMUSCULAR; INTRAVENOUS
Status: DISPENSED
Start: 2024-08-16

## (undated) RX ORDER — NITROGLYCERIN 5 MG/ML
VIAL (ML) INTRAVENOUS
Status: DISPENSED
Start: 2019-10-03

## (undated) RX ORDER — METHOHEXITAL IN WATER/PF 100MG/10ML
SYRINGE (ML) INTRAVENOUS
Status: DISPENSED
Start: 2024-06-17

## (undated) RX ORDER — LABETALOL HYDROCHLORIDE 5 MG/ML
INJECTION, SOLUTION INTRAVENOUS
Status: DISPENSED
Start: 2024-07-12

## (undated) RX ORDER — METHOHEXITAL IN WATER/PF 100MG/10ML
SYRINGE (ML) INTRAVENOUS
Status: DISPENSED
Start: 2024-06-07

## (undated) RX ORDER — KETOROLAC TROMETHAMINE 30 MG/ML
INJECTION, SOLUTION INTRAMUSCULAR; INTRAVENOUS
Status: DISPENSED
Start: 2024-10-11

## (undated) RX ORDER — METHOHEXITAL IN WATER/PF 100MG/10ML
SYRINGE (ML) INTRAVENOUS
Status: DISPENSED
Start: 2025-02-19

## (undated) RX ORDER — METHOHEXITAL IN WATER/PF 100MG/10ML
SYRINGE (ML) INTRAVENOUS
Status: DISPENSED
Start: 2024-09-13

## (undated) RX ORDER — METHOHEXITAL IN WATER/PF 100MG/10ML
SYRINGE (ML) INTRAVENOUS
Status: DISPENSED
Start: 2024-06-28

## (undated) RX ORDER — METHOHEXITAL IN WATER/PF 100MG/10ML
SYRINGE (ML) INTRAVENOUS
Status: DISPENSED
Start: 2024-11-22

## (undated) RX ORDER — KETOROLAC TROMETHAMINE 30 MG/ML
INJECTION, SOLUTION INTRAMUSCULAR; INTRAVENOUS
Status: DISPENSED
Start: 2024-06-17

## (undated) RX ORDER — LABETALOL HYDROCHLORIDE 5 MG/ML
INJECTION, SOLUTION INTRAVENOUS
Status: DISPENSED
Start: 2024-05-31

## (undated) RX ORDER — METHOHEXITAL IN WATER/PF 100MG/10ML
SYRINGE (ML) INTRAVENOUS
Status: DISPENSED
Start: 2024-08-02

## (undated) RX ORDER — METHOHEXITAL IN WATER/PF 100MG/10ML
SYRINGE (ML) INTRAVENOUS
Status: DISPENSED
Start: 2024-05-24

## (undated) RX ORDER — LABETALOL HYDROCHLORIDE 5 MG/ML
INJECTION, SOLUTION INTRAVENOUS
Status: DISPENSED
Start: 2025-03-12

## (undated) RX ORDER — KETOROLAC TROMETHAMINE 30 MG/ML
INJECTION, SOLUTION INTRAMUSCULAR; INTRAVENOUS
Status: DISPENSED
Start: 2024-07-05

## (undated) RX ORDER — METHOHEXITAL IN WATER/PF 100MG/10ML
SYRINGE (ML) INTRAVENOUS
Status: DISPENSED
Start: 2024-07-05

## (undated) RX ORDER — LABETALOL HYDROCHLORIDE 5 MG/ML
INJECTION, SOLUTION INTRAVENOUS
Status: DISPENSED
Start: 2024-06-12

## (undated) RX ORDER — LABETALOL HYDROCHLORIDE 5 MG/ML
INJECTION, SOLUTION INTRAVENOUS
Status: DISPENSED
Start: 2025-04-23

## (undated) RX ORDER — METHOHEXITAL IN WATER/PF 100MG/10ML
SYRINGE (ML) INTRAVENOUS
Status: DISPENSED
Start: 2025-02-05

## (undated) RX ORDER — FENTANYL CITRATE 50 UG/ML
INJECTION, SOLUTION INTRAMUSCULAR; INTRAVENOUS
Status: DISPENSED
Start: 2019-10-03

## (undated) RX ORDER — KETOROLAC TROMETHAMINE 30 MG/ML
INJECTION, SOLUTION INTRAMUSCULAR; INTRAVENOUS
Status: DISPENSED
Start: 2024-06-21

## (undated) RX ORDER — NICARDIPINE HCL-0.9% SOD CHLOR 1 MG/10 ML
SYRINGE (ML) INTRAVENOUS
Status: DISPENSED
Start: 2025-03-12

## (undated) RX ORDER — KETOROLAC TROMETHAMINE 30 MG/ML
INJECTION, SOLUTION INTRAMUSCULAR; INTRAVENOUS
Status: DISPENSED
Start: 2025-07-02

## (undated) RX ORDER — METHOHEXITAL IN WATER/PF 100MG/10ML
SYRINGE (ML) INTRAVENOUS
Status: DISPENSED
Start: 2024-09-04

## (undated) RX ORDER — NICARDIPINE HCL-0.9% SOD CHLOR 1 MG/10 ML
SYRINGE (ML) INTRAVENOUS
Status: DISPENSED
Start: 2024-06-19

## (undated) RX ORDER — LABETALOL HYDROCHLORIDE 5 MG/ML
INJECTION, SOLUTION INTRAVENOUS
Status: DISPENSED
Start: 2024-11-22

## (undated) RX ORDER — KETOROLAC TROMETHAMINE 30 MG/ML
INJECTION, SOLUTION INTRAMUSCULAR; INTRAVENOUS
Status: DISPENSED
Start: 2024-11-22

## (undated) RX ORDER — METHOHEXITAL IN WATER/PF 100MG/10ML
SYRINGE (ML) INTRAVENOUS
Status: DISPENSED
Start: 2025-05-14

## (undated) RX ORDER — POTASSIUM CHLORIDE 750 MG/1
TABLET, EXTENDED RELEASE ORAL
Status: DISPENSED
Start: 2019-10-03

## (undated) RX ORDER — LABETALOL HYDROCHLORIDE 5 MG/ML
INJECTION, SOLUTION INTRAVENOUS
Status: DISPENSED
Start: 2024-09-27

## (undated) RX ORDER — METHOHEXITAL IN WATER/PF 100MG/10ML
SYRINGE (ML) INTRAVENOUS
Status: DISPENSED
Start: 2024-05-29

## (undated) RX ORDER — KETOROLAC TROMETHAMINE 30 MG/ML
INJECTION, SOLUTION INTRAMUSCULAR; INTRAVENOUS
Status: DISPENSED
Start: 2024-06-03

## (undated) RX ORDER — METHOHEXITAL IN WATER/PF 100MG/10ML
SYRINGE (ML) INTRAVENOUS
Status: DISPENSED
Start: 2024-06-21

## (undated) RX ORDER — LABETALOL HYDROCHLORIDE 5 MG/ML
INJECTION, SOLUTION INTRAVENOUS
Status: DISPENSED
Start: 2025-06-11

## (undated) RX ORDER — KETOROLAC TROMETHAMINE 30 MG/ML
INJECTION, SOLUTION INTRAMUSCULAR; INTRAVENOUS
Status: DISPENSED
Start: 2024-11-08

## (undated) RX ORDER — METHOHEXITAL IN WATER/PF 100MG/10ML
SYRINGE (ML) INTRAVENOUS
Status: DISPENSED
Start: 2024-06-19

## (undated) RX ORDER — KETOROLAC TROMETHAMINE 30 MG/ML
INJECTION, SOLUTION INTRAMUSCULAR; INTRAVENOUS
Status: DISPENSED
Start: 2024-06-19

## (undated) RX ORDER — METHOHEXITAL IN WATER/PF 100MG/10ML
SYRINGE (ML) INTRAVENOUS
Status: DISPENSED
Start: 2025-04-23

## (undated) RX ORDER — NICARDIPINE HCL-0.9% SOD CHLOR 1 MG/10 ML
SYRINGE (ML) INTRAVENOUS
Status: DISPENSED
Start: 2024-10-11

## (undated) RX ORDER — NICARDIPINE HCL-0.9% SOD CHLOR 1 MG/10 ML
SYRINGE (ML) INTRAVENOUS
Status: DISPENSED
Start: 2024-08-02

## (undated) RX ORDER — KETOROLAC TROMETHAMINE 30 MG/ML
INJECTION, SOLUTION INTRAMUSCULAR; INTRAVENOUS
Status: DISPENSED
Start: 2025-03-12

## (undated) RX ORDER — KETOROLAC TROMETHAMINE 30 MG/ML
INJECTION, SOLUTION INTRAMUSCULAR; INTRAVENOUS
Status: DISPENSED
Start: 2025-01-22

## (undated) RX ORDER — NICARDIPINE HCL-0.9% SOD CHLOR 1 MG/10 ML
SYRINGE (ML) INTRAVENOUS
Status: DISPENSED
Start: 2024-11-22

## (undated) RX ORDER — HEPARIN SODIUM 1000 [USP'U]/ML
INJECTION, SOLUTION INTRAVENOUS; SUBCUTANEOUS
Status: DISPENSED
Start: 2019-10-03

## (undated) RX ORDER — KETOROLAC TROMETHAMINE 30 MG/ML
INJECTION, SOLUTION INTRAMUSCULAR; INTRAVENOUS
Status: DISPENSED
Start: 2024-06-05

## (undated) RX ORDER — METHOHEXITAL IN WATER/PF 100MG/10ML
SYRINGE (ML) INTRAVENOUS
Status: DISPENSED
Start: 2024-07-19

## (undated) RX ORDER — METHOHEXITAL IN WATER/PF 100MG/10ML
SYRINGE (ML) INTRAVENOUS
Status: DISPENSED
Start: 2025-04-02

## (undated) RX ORDER — KETOROLAC TROMETHAMINE 30 MG/ML
INJECTION, SOLUTION INTRAMUSCULAR; INTRAVENOUS
Status: DISPENSED
Start: 2024-09-13

## (undated) RX ORDER — KETOROLAC TROMETHAMINE 30 MG/ML
INJECTION, SOLUTION INTRAMUSCULAR; INTRAVENOUS
Status: DISPENSED
Start: 2025-04-23

## (undated) RX ORDER — METHOHEXITAL IN WATER/PF 100MG/10ML
SYRINGE (ML) INTRAVENOUS
Status: DISPENSED
Start: 2024-06-05

## (undated) RX ORDER — LABETALOL HYDROCHLORIDE 5 MG/ML
INJECTION, SOLUTION INTRAVENOUS
Status: DISPENSED
Start: 2025-02-19

## (undated) RX ORDER — METHOHEXITAL IN WATER/PF 100MG/10ML
SYRINGE (ML) INTRAVENOUS
Status: DISPENSED
Start: 2025-06-11

## (undated) RX ORDER — LABETALOL HYDROCHLORIDE 5 MG/ML
INJECTION, SOLUTION INTRAVENOUS
Status: DISPENSED
Start: 2025-07-02

## (undated) RX ORDER — METHOHEXITAL IN WATER/PF 100MG/10ML
SYRINGE (ML) INTRAVENOUS
Status: DISPENSED
Start: 2024-06-03

## (undated) RX ORDER — LABETALOL HYDROCHLORIDE 5 MG/ML
INJECTION, SOLUTION INTRAVENOUS
Status: DISPENSED
Start: 2024-06-19

## (undated) RX ORDER — KETOROLAC TROMETHAMINE 30 MG/ML
INJECTION, SOLUTION INTRAMUSCULAR; INTRAVENOUS
Status: DISPENSED
Start: 2025-06-11

## (undated) RX ORDER — LABETALOL HYDROCHLORIDE 5 MG/ML
INJECTION, SOLUTION INTRAVENOUS
Status: DISPENSED
Start: 2024-08-16

## (undated) RX ORDER — KETOROLAC TROMETHAMINE 30 MG/ML
INJECTION, SOLUTION INTRAMUSCULAR; INTRAVENOUS
Status: DISPENSED
Start: 2024-06-07

## (undated) RX ORDER — KETOROLAC TROMETHAMINE 30 MG/ML
INJECTION, SOLUTION INTRAMUSCULAR; INTRAVENOUS
Status: DISPENSED
Start: 2024-12-13

## (undated) RX ORDER — KETOROLAC TROMETHAMINE 30 MG/ML
INJECTION, SOLUTION INTRAMUSCULAR; INTRAVENOUS
Status: DISPENSED
Start: 2024-08-02

## (undated) RX ORDER — METHOHEXITAL IN WATER/PF 100MG/10ML
SYRINGE (ML) INTRAVENOUS
Status: DISPENSED
Start: 2024-05-31

## (undated) RX ORDER — KETOROLAC TROMETHAMINE 30 MG/ML
INJECTION, SOLUTION INTRAMUSCULAR; INTRAVENOUS
Status: DISPENSED
Start: 2025-04-02

## (undated) RX ORDER — LABETALOL HYDROCHLORIDE 5 MG/ML
INJECTION, SOLUTION INTRAVENOUS
Status: DISPENSED
Start: 2024-06-10

## (undated) RX ORDER — LIDOCAINE 40 MG/G
CREAM TOPICAL
Status: DISPENSED
Start: 2019-10-03

## (undated) RX ORDER — LABETALOL HYDROCHLORIDE 5 MG/ML
INJECTION, SOLUTION INTRAVENOUS
Status: DISPENSED
Start: 2024-06-28

## (undated) RX ORDER — NICARDIPINE HCL-0.9% SOD CHLOR 1 MG/10 ML
SYRINGE (ML) INTRAVENOUS
Status: DISPENSED
Start: 2025-06-11

## (undated) RX ORDER — ESMOLOL HYDROCHLORIDE 10 MG/ML
INJECTION INTRAVENOUS
Status: DISPENSED
Start: 2024-06-14

## (undated) RX ORDER — METHOHEXITAL IN WATER/PF 100MG/10ML
SYRINGE (ML) INTRAVENOUS
Status: DISPENSED
Start: 2024-12-13

## (undated) RX ORDER — METHOHEXITAL IN WATER/PF 100MG/10ML
SYRINGE (ML) INTRAVENOUS
Status: DISPENSED
Start: 2025-07-02

## (undated) RX ORDER — ALBUTEROL SULFATE 0.83 MG/ML
SOLUTION RESPIRATORY (INHALATION)
Status: DISPENSED
Start: 2020-03-06

## (undated) RX ORDER — METHOHEXITAL IN WATER/PF 100MG/10ML
SYRINGE (ML) INTRAVENOUS
Status: DISPENSED
Start: 2024-08-16

## (undated) RX ORDER — NICARDIPINE HCL-0.9% SOD CHLOR 1 MG/10 ML
SYRINGE (ML) INTRAVENOUS
Status: DISPENSED
Start: 2025-02-19

## (undated) RX ORDER — KETOROLAC TROMETHAMINE 30 MG/ML
INJECTION, SOLUTION INTRAMUSCULAR; INTRAVENOUS
Status: DISPENSED
Start: 2024-09-04

## (undated) RX ORDER — LABETALOL HYDROCHLORIDE 5 MG/ML
INJECTION, SOLUTION INTRAVENOUS
Status: DISPENSED
Start: 2025-04-02

## (undated) RX ORDER — KETOROLAC TROMETHAMINE 30 MG/ML
INJECTION, SOLUTION INTRAMUSCULAR; INTRAVENOUS
Status: DISPENSED
Start: 2025-05-14

## (undated) RX ORDER — METHOHEXITAL IN WATER/PF 100MG/10ML
SYRINGE (ML) INTRAVENOUS
Status: DISPENSED
Start: 2024-06-14

## (undated) RX ORDER — METHOHEXITAL IN WATER/PF 100MG/10ML
SYRINGE (ML) INTRAVENOUS
Status: DISPENSED
Start: 2024-10-11

## (undated) RX ORDER — METHOHEXITAL IN WATER/PF 100MG/10ML
SYRINGE (ML) INTRAVENOUS
Status: DISPENSED
Start: 2025-03-12

## (undated) RX ORDER — LABETALOL HYDROCHLORIDE 5 MG/ML
INJECTION, SOLUTION INTRAVENOUS
Status: DISPENSED
Start: 2025-01-22

## (undated) RX ORDER — METHOHEXITAL IN WATER/PF 100MG/10ML
SYRINGE (ML) INTRAVENOUS
Status: DISPENSED
Start: 2024-10-25

## (undated) RX ORDER — LABETALOL HYDROCHLORIDE 5 MG/ML
INJECTION, SOLUTION INTRAVENOUS
Status: DISPENSED
Start: 2024-06-07

## (undated) RX ORDER — KETOROLAC TROMETHAMINE 30 MG/ML
INJECTION, SOLUTION INTRAMUSCULAR; INTRAVENOUS
Status: DISPENSED
Start: 2024-06-14

## (undated) RX ORDER — METHOHEXITAL IN WATER/PF 100MG/10ML
SYRINGE (ML) INTRAVENOUS
Status: DISPENSED
Start: 2024-06-12

## (undated) RX ORDER — METHOHEXITAL IN WATER/PF 100MG/10ML
SYRINGE (ML) INTRAVENOUS
Status: DISPENSED
Start: 2024-11-08

## (undated) RX ORDER — KETOROLAC TROMETHAMINE 30 MG/ML
INJECTION, SOLUTION INTRAMUSCULAR; INTRAVENOUS
Status: DISPENSED
Start: 2024-07-12

## (undated) RX ORDER — LABETALOL HYDROCHLORIDE 5 MG/ML
INJECTION, SOLUTION INTRAVENOUS
Status: DISPENSED
Start: 2024-11-08

## (undated) RX ORDER — ESMOLOL HYDROCHLORIDE 10 MG/ML
INJECTION INTRAVENOUS
Status: DISPENSED
Start: 2025-03-12

## (undated) RX ORDER — KETOROLAC TROMETHAMINE 30 MG/ML
INJECTION, SOLUTION INTRAMUSCULAR; INTRAVENOUS
Status: DISPENSED
Start: 2024-06-12

## (undated) RX ORDER — NICARDIPINE HCL-0.9% SOD CHLOR 1 MG/10 ML
SYRINGE (ML) INTRAVENOUS
Status: DISPENSED
Start: 2024-07-19

## (undated) RX ORDER — LABETALOL HYDROCHLORIDE 5 MG/ML
INJECTION, SOLUTION INTRAVENOUS
Status: DISPENSED
Start: 2024-12-13

## (undated) RX ORDER — KETOROLAC TROMETHAMINE 30 MG/ML
INJECTION, SOLUTION INTRAMUSCULAR; INTRAVENOUS
Status: DISPENSED
Start: 2024-07-19

## (undated) RX ORDER — NICARDIPINE HCL-0.9% SOD CHLOR 1 MG/10 ML
SYRINGE (ML) INTRAVENOUS
Status: DISPENSED
Start: 2024-06-10

## (undated) RX ORDER — ESMOLOL HYDROCHLORIDE 10 MG/ML
INJECTION INTRAVENOUS
Status: DISPENSED
Start: 2025-04-23

## (undated) RX ORDER — KETOROLAC TROMETHAMINE 30 MG/ML
INJECTION, SOLUTION INTRAMUSCULAR; INTRAVENOUS
Status: DISPENSED
Start: 2024-06-28

## (undated) RX ORDER — LIDOCAINE 40 MG/G
CREAM TOPICAL
Status: DISPENSED
Start: 2024-09-25